# Patient Record
Sex: MALE | Race: WHITE | NOT HISPANIC OR LATINO | Employment: FULL TIME | URBAN - METROPOLITAN AREA
[De-identification: names, ages, dates, MRNs, and addresses within clinical notes are randomized per-mention and may not be internally consistent; named-entity substitution may affect disease eponyms.]

---

## 2017-03-21 ENCOUNTER — ALLSCRIPTS OFFICE VISIT (OUTPATIENT)
Dept: OTHER | Facility: OTHER | Age: 52
End: 2017-03-21

## 2017-03-21 DIAGNOSIS — E11.9 TYPE 2 DIABETES MELLITUS WITHOUT COMPLICATIONS (HCC): ICD-10-CM

## 2017-03-21 DIAGNOSIS — N52.9 MALE ERECTILE DYSFUNCTION: ICD-10-CM

## 2017-03-21 DIAGNOSIS — E66.9 OBESITY: ICD-10-CM

## 2017-03-21 DIAGNOSIS — E78.5 HYPERLIPIDEMIA: ICD-10-CM

## 2017-03-21 DIAGNOSIS — Z12.5 ENCOUNTER FOR SCREENING FOR MALIGNANT NEOPLASM OF PROSTATE: ICD-10-CM

## 2017-03-21 DIAGNOSIS — I10 ESSENTIAL (PRIMARY) HYPERTENSION: ICD-10-CM

## 2017-03-21 DIAGNOSIS — N17.9 ACUTE KIDNEY FAILURE (HCC): ICD-10-CM

## 2017-03-21 DIAGNOSIS — E86.0 DEHYDRATION: ICD-10-CM

## 2017-04-10 ENCOUNTER — GENERIC CONVERSION - ENCOUNTER (OUTPATIENT)
Dept: OTHER | Facility: OTHER | Age: 52
End: 2017-04-10

## 2017-04-10 LAB
AMBIG ABBREV CMP14 DEFAULT (HISTORICAL): NORMAL
AMBIG ABBREV LP DEFAULT (HISTORICAL): NORMAL
BASOPHILS # BLD AUTO: 0 %
BASOPHILS # BLD AUTO: 0 X10E3/UL (ref 0–0.2)
DEPRECATED RDW RBC AUTO: 13.6 % (ref 12.3–15.4)
EOSINOPHIL # BLD AUTO: 0.6 X10E3/UL (ref 0–0.4)
EOSINOPHIL # BLD AUTO: 6 %
HCT VFR BLD AUTO: 39.5 % (ref 37.5–51)
HGB BLD-MCNC: 13.3 G/DL (ref 12.6–17.7)
IMM.GRANULOCYTES (CD4/8) (HISTORICAL): 0 %
IMM.GRANULOCYTES (CD4/8) (HISTORICAL): 0 X10E3/UL (ref 0–0.1)
LYMPHOCYTES # BLD AUTO: 3.5 X10E3/UL (ref 0.7–3.1)
LYMPHOCYTES # BLD AUTO: 36 %
MCH RBC QN AUTO: 29.9 PG (ref 26.6–33)
MCHC RBC AUTO-ENTMCNC: 33.7 G/DL (ref 31.5–35.7)
MCV RBC AUTO: 89 FL (ref 79–97)
MONOCYTES # BLD AUTO: 0.7 X10E3/UL (ref 0.1–0.9)
MONOCYTES (HISTORICAL): 7 %
NEUTROPHILS # BLD AUTO: 5 X10E3/UL (ref 1.4–7)
NEUTROPHILS # BLD AUTO: 51 %
PLATELET # BLD AUTO: 317 X10E3/UL (ref 150–379)
RBC (HISTORICAL): 4.45 X10E6/UL (ref 4.14–5.8)
WBC # BLD AUTO: 9.8 X10E3/UL (ref 3.4–10.8)

## 2017-04-11 LAB
A/G RATIO (HISTORICAL): 2 (ref 1.2–2.2)
ALBUMIN SERPL BCP-MCNC: 4.1 G/DL (ref 3.5–5.5)
ALP SERPL-CCNC: 94 IU/L (ref 39–117)
ALT SERPL W P-5'-P-CCNC: 26 IU/L (ref 0–44)
AST SERPL W P-5'-P-CCNC: 25 IU/L (ref 0–40)
BACTERIA UR QL AUTO: NORMAL
BILIRUB SERPL-MCNC: 0.6 MG/DL (ref 0–1.2)
BILIRUB UR QL STRIP: NEGATIVE
BUN SERPL-MCNC: 32 MG/DL (ref 6–24)
BUN/CREA RATIO (HISTORICAL): 22 (ref 9–20)
CALCIUM SERPL-MCNC: 9 MG/DL (ref 8.7–10.2)
CHLORIDE SERPL-SCNC: 99 MMOL/L (ref 96–106)
CHOLEST SERPL-MCNC: 91 MG/DL (ref 100–199)
CO2 SERPL-SCNC: 23 MMOL/L (ref 18–29)
COLOR UR: YELLOW
COMMENT (HISTORICAL): ABNORMAL
COMMENT (HISTORICAL): CLEAR
CREAT SERPL-MCNC: 1.47 MG/DL (ref 0.76–1.27)
EGFR AFRICAN AMERICAN (HISTORICAL): 63 ML/MIN/1.73
EGFR-AMERICAN CALC (HISTORICAL): 54 ML/MIN/1.73
FECAL OCCULT BLOOD DIAGNOSTIC (HISTORICAL): NEGATIVE
GLUCOSE (HISTORICAL): NEGATIVE
GLUCOSE SERPL-MCNC: 132 MG/DL (ref 65–99)
HBA1C MFR BLD HPLC: 6.6 % (ref 4.8–5.6)
HDLC SERPL-MCNC: 29 MG/DL
KETONES UR STRIP-MCNC: NEGATIVE MG/DL
LDLC SERPL CALC-MCNC: 44 MG/DL (ref 0–99)
LEUKOCYTE ESTERASE UR QL STRIP: NEGATIVE
MICROSCOPIC EXAMINATION (HISTORICAL): NORMAL
MICROSCOPIC EXAMINATION (HISTORICAL): NORMAL
MUCUS THREADS (HISTORICAL): PRESENT
NITRITE UR QL STRIP: NEGATIVE
NON-SQ EPI CELLS URNS QL MICRO: NORMAL /HPF
PH UR STRIP.AUTO: 5.5 [PH] (ref 5–7.5)
POTASSIUM SERPL-SCNC: 4.8 MMOL/L (ref 3.5–5.2)
PROSTATE SPECIFIC ANTIGEN (HISTORICAL): 0.8 NG/ML (ref 0–4)
PROT UR STRIP-MCNC: NORMAL MG/DL
RBC (HISTORICAL): NORMAL /HPF
SODIUM SERPL-SCNC: 139 MMOL/L (ref 134–144)
SP GR UR STRIP.AUTO: 1.03 (ref 1–1.03)
TESTOSTERONE FREE (HISTORICAL): 3.5 PG/ML (ref 7.2–24)
TESTOSTERONE TOTAL (HISTORICAL): 244 NG/DL (ref 348–1197)
TOT. GLOBULIN, SERUM (HISTORICAL): 2.1 G/DL (ref 1.5–4.5)
TOTAL PROTEIN (HISTORICAL): 6.2 G/DL (ref 6–8.5)
TRIGL SERPL-MCNC: 91 MG/DL (ref 0–149)
TSH SERPL DL<=0.05 MIU/L-ACNC: 0.15 UIU/ML (ref 0.45–4.5)
URINALYSIS (UA) (HISTORICAL): NORMAL
UROBILINOGEN UR QL STRIP.AUTO: 1 EU/DL (ref 0.2–1)
VLDLC SERPL CALC-MCNC: 18 MG/DL (ref 5–40)
WBC # BLD AUTO: NORMAL /HPF

## 2017-05-02 ENCOUNTER — GENERIC CONVERSION - ENCOUNTER (OUTPATIENT)
Dept: OTHER | Facility: OTHER | Age: 52
End: 2017-05-02

## 2017-05-03 LAB
BUN SERPL-MCNC: 23 MG/DL (ref 6–24)
BUN/CREA RATIO (HISTORICAL): 19 (ref 9–20)
CALCIUM SERPL-MCNC: 9.5 MG/DL (ref 8.7–10.2)
CHLORIDE SERPL-SCNC: 100 MMOL/L (ref 96–106)
CO2 SERPL-SCNC: 21 MMOL/L (ref 18–29)
CREAT SERPL-MCNC: 1.18 MG/DL (ref 0.76–1.27)
EGFR AFRICAN AMERICAN (HISTORICAL): 82 ML/MIN/1.73
EGFR-AMERICAN CALC (HISTORICAL): 71 ML/MIN/1.73
GLUCOSE SERPL-MCNC: 118 MG/DL (ref 65–99)
POTASSIUM SERPL-SCNC: 4.5 MMOL/L (ref 3.5–5.2)
SODIUM SERPL-SCNC: 138 MMOL/L (ref 134–144)
T3FREE SERPL-MCNC: 124 NG/DL (ref 71–180)
T4 FREE SERPL-MCNC: 1.32 NG/DL (ref 0.82–1.77)

## 2017-05-30 ENCOUNTER — ALLSCRIPTS OFFICE VISIT (OUTPATIENT)
Dept: OTHER | Facility: OTHER | Age: 52
End: 2017-05-30

## 2017-06-19 ENCOUNTER — ALLSCRIPTS OFFICE VISIT (OUTPATIENT)
Dept: OTHER | Facility: OTHER | Age: 52
End: 2017-06-19

## 2017-06-19 ENCOUNTER — GENERIC CONVERSION - ENCOUNTER (OUTPATIENT)
Dept: OTHER | Facility: OTHER | Age: 52
End: 2017-06-19

## 2017-06-19 DIAGNOSIS — E29.1 TESTICULAR HYPOFUNCTION: ICD-10-CM

## 2017-07-21 ENCOUNTER — GENERIC CONVERSION - ENCOUNTER (OUTPATIENT)
Dept: OTHER | Facility: OTHER | Age: 52
End: 2017-07-21

## 2017-07-22 LAB
DEPRECATED RDW RBC AUTO: 13.6 % (ref 12.3–15.4)
HCT VFR BLD AUTO: 39.3 % (ref 37.5–51)
HGB BLD-MCNC: 13.6 G/DL (ref 12.6–17.7)
MCH RBC QN AUTO: 30.2 PG (ref 26.6–33)
MCHC RBC AUTO-ENTMCNC: 34.6 G/DL (ref 31.5–35.7)
MCV RBC AUTO: 87 FL (ref 79–97)
PLATELET # BLD AUTO: 305 X10E3/UL (ref 150–379)
PROSTATE SPECIFIC ANTIGEN (HISTORICAL): 1.1 NG/ML (ref 0–4)
RBC (HISTORICAL): 4.5 X10E6/UL (ref 4.14–5.8)
TESTOSTERONE TOTAL (HISTORICAL): 447 NG/DL (ref 264–916)
WBC # BLD AUTO: 10.8 X10E3/UL (ref 3.4–10.8)

## 2017-07-28 ENCOUNTER — ALLSCRIPTS OFFICE VISIT (OUTPATIENT)
Dept: OTHER | Facility: OTHER | Age: 52
End: 2017-07-28

## 2017-08-28 ENCOUNTER — ALLSCRIPTS OFFICE VISIT (OUTPATIENT)
Dept: OTHER | Facility: OTHER | Age: 52
End: 2017-08-28

## 2017-12-01 DIAGNOSIS — R93.5 ABNORMAL FINDINGS ON DIAGNOSTIC IMAGING OF OTHER ABDOMINAL REGIONS, INCLUDING RETROPERITONEUM: ICD-10-CM

## 2017-12-01 DIAGNOSIS — Z12.5 ENCOUNTER FOR SCREENING FOR MALIGNANT NEOPLASM OF PROSTATE: ICD-10-CM

## 2017-12-01 DIAGNOSIS — E29.1 TESTICULAR HYPOFUNCTION: ICD-10-CM

## 2018-01-10 NOTE — PROGRESS NOTES
History of Present Illness  Care Coordination Encounter Information:   Type of Encounter: Telephonic    Spoke to Patient  Care Coordination SL Nurse [employer]:   The reason for call is to discuss outreach for follow up/needed services and coordination of meeting care plan treatment goals  Followed up with patient in regards to progress; s/p discharge on 8/26/16  Patient in great mood and stated he is doing great  He is healing well and Protonix is working well  Patient also stated he is not having any n&v or s/s of pain or discomfort  Patient has a f/u appointment scheduled on 12/20/16 with Dr Bernabe Closs at [address]  Offered patient assistance; patient had no questions or concerns  Attempted to give my contact information and stated to feel free to reach my direct number if he has any further questions or concerns  Patient kindly stated that would not be necessary, he is doing well and is compliant with following up with PCP if he has any concerns  Active Problems    1  Abnormal CT of the abdomen (793 6) (R93 5)   2  ARF (acute renal failure) (584 9) (N17 9)   3  Benign essential hypertension (401 1) (I10)   4  Dehydration (276 51) (E86 0)   5  Diabetes mellitus, type 2 (250 00) (E11 9)   6  Enteritis (558 9) (K52 9)   7  Erectile dysfunction (607 84) (N52 9)   8  Hernia, ventral (553 20) (K43 9)   9  Hyperlipidemia (272 4) (E78 5)   10  Nausea and vomiting (787 01) (R11 2)   11  Obesity (278 00) (E66 9)   12  Screening for colon cancer (V76 51) (Z12 11)    Past Medical History    1  History of Denial (799 29) (R45 89)    Surgical History    1  Denied: History of Recent Surgery    Family History  Mother    1  Family history of diabetes mellitus (V18 0) (Z83 3)   2  Family history of lung cancer (V16 1) (Z80 1)  Father    3  Family history of CAD (coronary artery disease)    Social History    · Never a smoker   · Non-smoker (V49 89) (Z78 9)    Current Meds    1   AmLODIPine Besylate 5 MG Oral Tablet; TAKE 1 TABLET TWICE DAILY  Requested for:   04Aug2016; Last Rx:04Aug2016 Ordered   2  Lisinopril 40 MG Oral Tablet; TAKE 1 TABLET DAILY  Requested for: 09Aug2016; Last   Rx:09Aug2016 Ordered    3  GlyBURIDE-MetFORMIN 5-500 MG Oral Tablet; Take 2 tablets by mouth  twice a day    Requested for: 08Aug2016; Last Rx:08Aug2016 Ordered    4  Atorvastatin Calcium 40 MG Oral Tablet; take 1 tablet by mouth every day  Requested for:   04Aug2016; Last Rx:04Aug2016 Ordered    5  Aspirin EC 81 MG Oral Tablet Delayed Release Recorded    Allergies    1  No Known Drug Allergies    2  Shellfish    End of Encounter Meds    1  AmLODIPine Besylate 5 MG Oral Tablet; TAKE 1 TABLET TWICE DAILY  Requested for:   04Aug2016; Last Rx:04Aug2016 Ordered   2  Lisinopril 40 MG Oral Tablet; TAKE 1 TABLET DAILY  Requested for: 09Aug2016; Last   Rx:09Aug2016 Ordered    3  GlyBURIDE-MetFORMIN 5-500 MG Oral Tablet; Take 2 tablets by mouth  twice a day    Requested for: 08Aug2016; Last Rx:08Aug2016 Ordered    4  Atorvastatin Calcium 40 MG Oral Tablet; take 1 tablet by mouth every day  Requested for:   04Aug2016; Last Rx:04Aug2016 Ordered    5  Aspirin EC 81 MG Oral Tablet Delayed Release Recorded    Future Appointments    Date/Time Provider Specialty Site   12/19/2016 03:15 PM Olga Griffith MD Urology [address] [place] [address]   12/20/2016 08:15 AM GEM Godwin   Internal Medicine [facility]     Signatures   Electronically signed by : Suyapa Lyons RN; Sep 13 2016  3:30PM EST                       (Author)

## 2018-01-12 NOTE — MISCELLANEOUS
Message   Date: 24 Dec 2016 1:43 PM EST, Recorded By: Ofelia López For: Mary Ingris   Caller: Jean Pierre Randall, Self   Phone: [phone] (Home)   Reason: Renew Medication   Answering service 12/24/2016 1:43 pm   He needs a prescription called in to [facility] for his diabetes-  Please call pharmacy [phone]  Updated now his pharmacy to [facility] [place] [phone]  Plan  answering service contacte me in error while on vacation  I informed them of the mistake        Signatures   Electronically signed by : GEM Coelho ; Jan 5 2017  1:34PM EST                       (Author)

## 2018-01-13 VITALS
SYSTOLIC BLOOD PRESSURE: 150 MMHG | WEIGHT: 281 LBS | BODY MASS INDEX: 39.34 KG/M2 | RESPIRATION RATE: 16 BRPM | HEIGHT: 71 IN | HEART RATE: 80 BPM | DIASTOLIC BLOOD PRESSURE: 90 MMHG

## 2018-01-13 VITALS
SYSTOLIC BLOOD PRESSURE: 148 MMHG | BODY MASS INDEX: 38.92 KG/M2 | HEIGHT: 71 IN | OXYGEN SATURATION: 98 % | DIASTOLIC BLOOD PRESSURE: 82 MMHG | HEART RATE: 93 BPM | WEIGHT: 278 LBS | TEMPERATURE: 98.3 F

## 2018-01-13 VITALS
HEIGHT: 71 IN | HEART RATE: 89 BPM | DIASTOLIC BLOOD PRESSURE: 72 MMHG | TEMPERATURE: 98.3 F | BODY MASS INDEX: 38.27 KG/M2 | SYSTOLIC BLOOD PRESSURE: 118 MMHG | OXYGEN SATURATION: 94 % | WEIGHT: 273.38 LBS

## 2018-01-13 VITALS
WEIGHT: 264.38 LBS | SYSTOLIC BLOOD PRESSURE: 160 MMHG | DIASTOLIC BLOOD PRESSURE: 100 MMHG | HEART RATE: 90 BPM | BODY MASS INDEX: 37.01 KG/M2 | HEIGHT: 71 IN

## 2018-01-13 NOTE — RESULT NOTES
Message    Celiac disease panel is negative  LMOM for pt to call the office for results  CF  LMOM X2 for pt to call the office for results  CF             Verified Results  (1) CELIAC DISEASE AB PROFILE [id number] 04:57AM Ange Enedelia     Test Name Result Flag Reference   tTG IGG <2 U/mL  0 - 5   Negative        0 - 5                                Weak Positive   6 - 9                                Positive           >9   tTG IGA <2 U/mL  0 - 3   Negative        0 -  3                                Weak Positive   4 - 10                                Positive           >10   Tissue Transglutaminase (tTG) has been identified   as the endomysial antigen  Studies have demonstr-   ated that endomysial IgA antibodies have over 99%   specificity for gluten sensitive enteropathy     GLIADA 3 units  0 - 19   Negative                   0 - 19                     Weak Positive             20 - 30                     Moderate to Strong Positive   >30   GLIADG 5 units  0 - 19   Negative                   0 - 19                     Weak Positive             20 - 30                     Moderate to Strong Positive   >30   ENDOMYSIAL AB IGA Negative  Negative   Performed at:  [employer] [address]  [id number]  : Chiquis Roman MD, Phone:  [phone]    mg/dL  90 - 158

## 2018-01-14 NOTE — MISCELLANEOUS
Message   Date: 24 Dec 2016 1:43 PM EST, Recorded By: Aleks Keller For: [facility]   Reason: Renew Medication        Plan  answering service contacte me in error while on vacation  I informed them of the mistake        Signatures   Electronically signed by : GEM Silver ; Jan 5 2017  1:35PM EST                       (Author)

## 2018-01-16 NOTE — MISCELLANEOUS
Assessment    1  Enteritis (558 9) (K52 9)   2  ARF (acute renal failure) (584 9) (N17 9)    Discussion/Summary  Discussion Summary:   Enteritis--this seems to be resolved or resolving  Patient is advised to continue and finish the Levaquin and Flagyl  Acute renal failure--at the time of discharge this had been resolved however we will follow-up on the basic metabolic panel that he did yesterday  She needed to hold lisinopril/hydrochlorothiazide and glipizide/metformin  Diabetes--continue to check her blood sugars fasting in the a m  and a few times a week check a 2 hour postprandial sugar after your dinner meal  Reportedly her sugars to the office  We will notify you if you can and should go back on the glipizide/metformin  Hypertension--the patient is off the lisinopril-hydrochlorothiazide 20-12 5 mg, but he continues to take lisinopril 20 mg once a day, and amlodipine 5 mg twice a day  His blood pressure is stable  Continue those current medications  Chief Complaint  Chief Complaint Free Text Note Form: hospital follow-up      History of Present Illness  [facility] Communication  [facility]: The patient is being contacted for follow-up after hospitalization  He was hospitalized at [facility]  The date of discharge: 8/1/16  Diagnosis: enteritis  Medications reviewed and updated today  He did not schedule a follow up appointment  Follow-up appointments with other specialists: GI f/u  Counseling was provided to the patient  doing much better  Communication performed and completed by lorne rizzo   HPI: patient presents for hospital follow-up  He was admitted to [facility] on July 30th and discharged on August first  He was admitted for nausea and vomiting and diagnosed with enteritis  He also had a brief episode of acute renal failure that corrected quickly  He was treated with anti-biotics and sent home with Levaquin and Flagyl he still taking them    He is feeling much better today   he states he feels great , no abdominal pain, no diarrhea, no nausea or vomiting  He is having a colonoscopy done soon with Dr Washington Degree  When he was discharged his lisinopril/HCTZ, and glipizide-metformin were both held due to the acute renal failure  he was advised to do repeat blood work and follow up with his doctor  he had blood work done yesterday at [facility] and at this time we do not have the results  We will follow up on them  Fasting blood sugar today was 104  Colitis, Unspecified: The patient is being seen for an initial evaluation of colitis  Symptoms:  episode is resolved  The patient is currently asymptomatic  Review of Systems  Complete-Male:   Constitutional: No fever or chills, feels well, no tiredness, no recent weight gain or weight loss  Eyes: No complaints of eye pain, no red eyes, no discharge from eyes, no itchy eyes  ENT: no complaints of earache, no hearing loss, no nosebleeds, no nasal discharge, no sore throat, no hoarseness  Cardiovascular: No complaints of slow heart rate, no fast heart rate, no chest pain, no palpitations, no leg claudication, no lower extremity  Respiratory: No complaints of shortness of breath, no wheezing, no cough, no SOB on exertion, no orthopnea or PND  Gastrointestinal: No complaints of abdominal pain, no constipation, no nausea or vomiting, no diarrhea or bloody stools  ROS Reviewed:   ROS reviewed  Active Problems    1  Benign essential hypertension (401 1) (I10)   2  Diabetes mellitus, type 2 (250 00) (E11 9)   3  Erectile dysfunction (607 84) (N52 9)   4  Hyperlipidemia (272 4) (E78 5)   5  Obesity (278 00) (E66 9)   6  Screening for colon cancer (V76 51) (Z12 11)    Past Medical History    1  History of Denial (599 29) (R46 89)    Surgical History    1  Denied: History of Recent Surgery  Surgical History Reviewed: The surgical history was reviewed and updated today  Family History  Mother    1   Family history of diabetes mellitus (V18 0) (Z83 3)   2  Family history of lung cancer (V16 1) (Z80 1)  Father    3  Family history of CAD (coronary artery disease)  Family History Reviewed: The family history was reviewed and updated today  Social History    · Never a smoker   · Non-smoker (V49 89) (Z78 9)  Social History Reviewed: The social history was reviewed and updated today  Current Meds   1  AmLODIPine Besylate 5 MG Oral Tablet Recorded   2  Aspirin EC 81 MG Oral Tablet Delayed Release Recorded   3  Atorvastatin Calcium 40 MG Oral Tablet Recorded   4  GlyBURIDE-MetFORMIN 5-500 MG Oral Tablet; Take 2 tablets by mouth  twice a day   Recorded   5  Lisinopril 20 MG Oral Tablet; TAKE 1 TABLET DAILY Recorded   6  Lisinopril-Hydrochlorothiazide 20-12 5 MG Oral Tablet; Take 1 tablet twice daily; Therapy: (Recorded:35Tfq9820) to Recorded  Medication List Reviewed: The medication list was reviewed and updated today  Allergies    1  No Known Drug Allergies    2  Shellfish    Physical Exam    Constitutional   General appearance: No acute distress, well appearing and well nourished  Eyes   Conjunctiva and lids: No swelling, erythema, or discharge  Pupils and irises: Equal, round and reactive to light  Ears, Nose, Mouth, and Throat   External inspection of ears and nose: Normal     Otoscopic examination: Tympanic membrance translucent with normal light reflex  Canals patent without erythema  Nasal mucosa, septum, and turbinates: Normal without edema or erythema  Oropharynx: Normal with no erythema, edema, exudate or lesions  Pulmonary   Respiratory effort: No increased work of breathing or signs of respiratory distress  Auscultation of lungs: Clear to auscultation, equal breath sounds bilaterally, no wheezes, no rales, no rhonci  Cardiovascular   Auscultation of heart: Normal rate and rhythm, normal S1 and S2, without murmurs      Examination of extremities for edema and/or varicosities: Normal  Abdomen   Abdomen: Non-tender, no masses  Lymphatic   Palpation of lymph nodes in neck: No lymphadenopathy  Musculoskeletal   Gait and station: Normal          Provider Comments  Provider Comments:   labs and tests in the hospital-    CAT scan of the abdomen and pelvis showed mild wall thickening of Proximal small bowel suggesting enteritis, appendix and gallbladder were normal     amylase and lipase were 75 and 29 respectively  CBC showed a white cell count of 15, dropping to 10 3 on discharge, H&H and platelets were normal     Basic metabolic panel showed a creatinine 1 81 on admission, 1 21 on discharge otherwise normal    Hemoglobin A1c was 7 2    Potassium was 3 4 on admission 3 8 on discharge      Future Appointments    Date/Time Provider Specialty Site   08/25/2016 10:30 AM Jesse Snell MD Urology [address] [place] [address]   12/20/2016 08:15 GEM Myers  Internal Medicine [facility]   08/16/2016 07:45 AM GEM Khan  Gastroenterology Adult [address]     Signatures   Electronically signed by : SANTOS Vásquez;  Aug  4 2016  3:55PM EST                       (Author)    Electronically signed by : GEM Cutler ; Aug  4 2016  5:21PM EST

## 2018-01-16 NOTE — MISCELLANEOUS
History of Present Illness  TCM Communication [facility]: The patient is being contacted for follow-up after hospitalization and 1ST TRY- ANS VIVEK, 2ND TRY- ANS VIVEK, pt never made appt  He was hospitalized at [facility]  The date of discharge: 8/26/16  Diagnosis: INTRACTABLE N/V  Medications reviewed and updated today  He did not schedule a follow up appointment  Follow-up appointments with other specialists: NEEDS GI F/U  Counseling was provided to  DIDN'T REACH PT  Communication performed and completed by SUSIE CLARK      Active Problems    1  Abnormal CT of the abdomen (793 6) (R93 5)   2  ARF (acute renal failure) (584 9) (N17 9)   3  Benign essential hypertension (401 1) (I10)   4  Dehydration (276 51) (E86 0)   5  Diabetes mellitus, type 2 (250 00) (E11 9)   6  Enteritis (558 9) (K52 9)   7  Erectile dysfunction (607 84) (N52 9)   8  Hernia, ventral (553 20) (K43 9)   9  Hyperlipidemia (272 4) (E78 5)   10  Nausea and vomiting (787 01) (R11 2)   11  Obesity (278 00) (E66 9)   12  Screening for colon cancer (V76 51) (Z12 11)    Past Medical History    1  History of Denial (799 29) (R45 89)    Surgical History    1  Denied: History of Recent Surgery    Family History  Mother    1  Family history of diabetes mellitus (V18 0) (Z83 3)   2  Family history of lung cancer (V16 1) (Z80 1)  Father    3  Family history of CAD (coronary artery disease)    Social History    · Never a smoker   · Non-smoker (V49 89) (Z78 9)    Current Meds   1  AmLODIPine Besylate 5 MG Oral Tablet; TAKE 1 TABLET TWICE DAILY  Requested for:   22Txg5428; Last Rx:66Wwb5876 Ordered   2  Aspirin EC 81 MG Oral Tablet Delayed Release Recorded   3  Atorvastatin Calcium 40 MG Oral Tablet; take 1 tablet by mouth every day  Requested for:   47Qhe6064; Last Rx:15Mrn6761 Ordered   4  GlyBURIDE-MetFORMIN 5-500 MG Oral Tablet; Take 2 tablets by mouth  twice a day    Requested for: 37Wnd5682; Last Rx:97Nnn6746 Ordered   5   Lisinopril 40 MG Oral Tablet; TAKE 1 TABLET DAILY  Requested for: 27Dqk2128; Last   Rx:96Ovz2901 Ordered    Allergies    1  No Known Drug Allergies    2  Shellfish    Future Appointments    Date/Time Provider Specialty Site   12/19/2016 03:15 PM Namita Cunningham MD Urology [address] [place] [address]   12/20/2016 08:15 AM GEM Polo   Internal Medicine [facility]     Signatures   Electronically signed by : Mary Marcus, [facility]; Sep 15 2016  3:58PM EST                       (Author)    Electronically signed by : GEM Jeffries ; Sep 15 2016  5:41PM EST

## 2018-01-17 NOTE — PROGRESS NOTES
Chief Complaint  Pt returns to office for teaching of Testosterone injections  Active Problems    1  Abnormal CT of the abdomen (793 6) (R93 5)   2  ARF (acute renal failure) (584 9) (N17 9)   3  Benign essential hypertension (401 1) (I10)   4  Dehydration (276 51) (E86 0)   5  Diabetes mellitus, type 2 (250 00) (E11 9)   6  Encounter for prostate cancer screening (V76 44) (Z12 5)   7  Enteritis (558 9) (K52 9)   8  Erectile dysfunction (607 84) (N52 9)   9  Hernia, ventral (553 20) (K43 9)   10  Hyperlipidemia (272 4) (E78 5)   11  Low testosterone (257 2) (E29 1)   12  Nausea and vomiting (787 01) (R11 2)   13  Obesity (278 00) (E66 9)   14  Screening for colon cancer (V76 51) (Z12 11)   15  Testicular hypogonadism (257 2) (E29 1)    Current Meds   1  AmLODIPine Besylate 5 MG Oral Tablet; TAKE 1 TABLET TWICE DAILY  Requested for:   21Mar2017; Last Rx:21Mar2017 Ordered   2  Aspirin EC 81 MG Oral Tablet Delayed Release Recorded   3  Atorvastatin Calcium 40 MG Oral Tablet; take 1 tablet by mouth every day  Requested   for: 21Mar2017; Last Rx:21Mar2017 Ordered   4  GlyBURIDE-MetFORMIN 5-500 MG Oral Tablet; Take 2 tablets by mouth  twice a day    Requested for: 21Mar2017; Last Rx:21Mar2017 Ordered   5  Lisinopril 40 MG Oral Tablet; TAKE 1 TABLET DAILY  Requested for: 21Mar2017; Last   Rx:21Mar2017 Ordered   6  Ondansetron 4 MG Oral Tablet Disintegrating; 1 tablet PO for persistant vomiting  once   MDD:1 TDD:1;   Therapy: 31QGO5444 to (Last Rx:74Dzl3539)  Requested for: 66WCA9984 Ordered   7  Pantoprazole Sodium 40 MG Oral Tablet Delayed Release; 1 TAB QD;   Therapy: 57Vyv4003 to (Last OL:71DES6731)  Requested for: 39OXV9141 Ordered   8  Reglan 10 MG Oral Tablet; TAKE 1 TABLET 4 TIMES DAILY, BEFORE MEALS AND AT   BEDTIME; Therapy: (Recorded:01Jun2017) to Recorded   9  Testosterone Cypionate 200 MG/ML Intramuscular Solution; inject 0 5 ml once weekly;    Therapy: 40HPN9405 to (Last RN:61STU1489) Ordered    Allergies    1  No Known Drug Allergies    2  Shellfish    Procedure    Procedure:   Pt brought medicine and syringes to office to learn self administration of IM testosterone  Pt demonstrated preparation of syringe ( pt is diabetic and administers insulin) and the injection procedure itself with verbal instructions  Pt performed procedure well  Assessment    1  Testicular hypogonadism (257 2) (E29 1)    Plan  Low testosterone    · (1) PSA, DIAGNOSTIC (FOLLOW-UP); Status:Active - Retrospective By Protocol  Authorization; Requested SPENCER:[id number];    Perform:Merged with [facility]; DGR:[id number]; Last Updated By:Shaniqua Peterson; 2017 10:43:57 AM;Ordered; For:Low testosterone; Ordered By:Dalton Hairston;   · Follow-up visit in 6 weeks Evaluation and Treatment  Follow-up in 6 weeks with CBC,  Testosterone level and PSA PTV  Status: Hold For - Scheduling,Retrospective By  Protocol Authorization  Requested for: [id number]   Ordered; For: Low testosterone; Ordered By: Gwen Toledo Performed:  Due: 64BQX6553; Last Updated By: Joanna Boston; 2017 2:56:46 PM  Testicular hypogonadism    · (1) CBC/ PLT (NO DIFF); Status:Active - Retrospective By Protocol Authorization; Requested [id number];    Perform:Merged with [facility]; YNA:[id number]; Last Updated By:Shaniqua Peterson; 2017 10:42:34 AM;Ordered; For:Testicular hypogonadism; Ordered By:Dalton Hairston;   · (1) TESTOSTERONE; Status:Active - Retrospective By Protocol Authorization; Requested  POV:[id number];    Perform:Merged with [facility]; ELMER:[id number]; Last Updated By:Shaniqua Peterson; 2017 10:43:01 AM;Ordered; For:Testicular hypogonadism; Ordered By:Rosenthal, Rogerio Boas;     Future Appointments    Date/Time Provider Specialty Site   2017 01:30 PM Danni Estrada Urology [facility]     Signatures   Electronically signed by : Rojas Ibarra, ; 2017  2:57PM EST                       (Author) Electronically signed by : GEM Bernal ; Jun 23 2017  3:15PM EST

## 2018-01-22 VITALS
HEIGHT: 72 IN | SYSTOLIC BLOOD PRESSURE: 160 MMHG | HEART RATE: 80 BPM | DIASTOLIC BLOOD PRESSURE: 90 MMHG | WEIGHT: 278.13 LBS | BODY MASS INDEX: 37.67 KG/M2

## 2018-02-14 DIAGNOSIS — N52.9 ERECTILE DYSFUNCTION, UNSPECIFIED ERECTILE DYSFUNCTION TYPE: Primary | ICD-10-CM

## 2018-02-14 NOTE — TELEPHONE ENCOUNTER
Patient called requesting renewal of his Stendra prescription  Patient also calling to schedule follow up appointment  Patient travels for work and will be in the area next in May  Appointment scheduled  Patient requested blood work scripts needed for appointment be mailed to his home address

## 2018-04-16 ENCOUNTER — TELEPHONE (OUTPATIENT)
Dept: UROLOGY | Facility: CLINIC | Age: 53
End: 2018-04-16

## 2018-04-16 RX ORDER — PREDNISONE 10 MG/1
TABLET ORAL
COMMUNITY
Start: 2017-08-28 | End: 2018-04-17

## 2018-04-16 RX ORDER — TESTOSTERONE CYPIONATE 200 MG/ML
INJECTION INTRAMUSCULAR
COMMUNITY
Start: 2017-05-30 | End: 2018-05-10 | Stop reason: SDUPTHER

## 2018-04-16 RX ORDER — METHOCARBAMOL 750 MG/1
TABLET, FILM COATED ORAL
Refills: 0 | COMMUNITY
Start: 2018-02-21 | End: 2019-01-15

## 2018-04-16 NOTE — TELEPHONE ENCOUNTER
Returned call from patient, his pharmacy benefits changed from [facility] to [place] [address] phone number for pro act is [phone] , fax [phone]  Member ID # [id number]  Patient states he needs authorization for Selca  Advised will send to Ron Corona in Prior Auth   I Emailed patient labslips for CBC, testosterone and PSA to [email or web address] per his request

## 2018-04-17 ENCOUNTER — TELEPHONE (OUTPATIENT)
Dept: INTERNAL MEDICINE CLINIC | Facility: CLINIC | Age: 53
End: 2018-04-17

## 2018-04-17 ENCOUNTER — OFFICE VISIT (OUTPATIENT)
Dept: INTERNAL MEDICINE CLINIC | Facility: CLINIC | Age: 53
End: 2018-04-17
Payer: COMMERCIAL

## 2018-04-17 VITALS
RESPIRATION RATE: 16 BRPM | HEART RATE: 103 BPM | DIASTOLIC BLOOD PRESSURE: 92 MMHG | HEIGHT: 70 IN | WEIGHT: 292.4 LBS | BODY MASS INDEX: 41.86 KG/M2 | SYSTOLIC BLOOD PRESSURE: 144 MMHG | OXYGEN SATURATION: 95 %

## 2018-04-17 DIAGNOSIS — N52.9 ERECTILE DYSFUNCTION, UNSPECIFIED ERECTILE DYSFUNCTION TYPE: ICD-10-CM

## 2018-04-17 DIAGNOSIS — E78.2 MIXED HYPERLIPIDEMIA: ICD-10-CM

## 2018-04-17 DIAGNOSIS — K43.9 VENTRAL HERNIA WITHOUT OBSTRUCTION OR GANGRENE: ICD-10-CM

## 2018-04-17 DIAGNOSIS — I10 BENIGN ESSENTIAL HYPERTENSION: Chronic | ICD-10-CM

## 2018-04-17 DIAGNOSIS — E29.1 TESTICULAR HYPOGONADISM: ICD-10-CM

## 2018-04-17 DIAGNOSIS — E66.01 MORBID OBESITY (HCC): ICD-10-CM

## 2018-04-17 DIAGNOSIS — E11.8 TYPE 2 DIABETES MELLITUS WITH COMPLICATION, WITHOUT LONG-TERM CURRENT USE OF INSULIN (HCC): Primary | ICD-10-CM

## 2018-04-17 PROBLEM — R79.89 LOW TESTOSTERONE: Status: ACTIVE | Noted: 2017-05-30

## 2018-04-17 LAB
LEFT EYE DIABETIC RETINOPATHY: NORMAL
LEFT EYE IMAGE QUALITY: NORMAL
RIGHT EYE DIABETIC RETINOPATHY: NORMAL
RIGHT EYE IMAGE QUALITY: NORMAL
SEVERITY (EYE EXAM): NORMAL

## 2018-04-17 PROCEDURE — 99214 OFFICE O/P EST MOD 30 MIN: CPT | Performed by: INTERNAL MEDICINE

## 2018-04-17 PROCEDURE — 3008F BODY MASS INDEX DOCD: CPT | Performed by: INTERNAL MEDICINE

## 2018-04-17 PROCEDURE — 3072F LOW RISK FOR RETINOPATHY: CPT | Performed by: INTERNAL MEDICINE

## 2018-04-17 PROCEDURE — 92250 FUNDUS PHOTOGRAPHY W/I&R: CPT | Performed by: INTERNAL MEDICINE

## 2018-04-17 RX ORDER — GLYBURIDE-METFORMIN HYDROCHLORIDE 2.5; 5 MG/1; MG/1
1 TABLET ORAL 2 TIMES DAILY WITH MEALS
Qty: 90 TABLET | Refills: 3 | Status: SHIPPED | OUTPATIENT
Start: 2018-04-17 | End: 2018-04-24

## 2018-04-17 RX ORDER — ONDANSETRON 8 MG/1
8 TABLET, ORALLY DISINTEGRATING ORAL
COMMUNITY
Start: 2017-02-26 | End: 2018-04-17

## 2018-04-17 RX ORDER — PANTOPRAZOLE SODIUM 20 MG/1
20 TABLET, DELAYED RELEASE ORAL
COMMUNITY
Start: 2017-02-21 | End: 2018-09-20 | Stop reason: SDUPTHER

## 2018-04-17 RX ORDER — METOCLOPRAMIDE 10 MG/1
10 TABLET ORAL
COMMUNITY
Start: 2017-02-26 | End: 2019-10-08 | Stop reason: SDUPTHER

## 2018-04-17 NOTE — TELEPHONE ENCOUNTER
Patient was seen today,his Yo Richards is wrong  Eulalio Angela all along he has been taking 2 pills twice a day    But pick-up the new med's and its  1 pill  twice day   Was it change if so why , if not please sent the addication med's

## 2018-04-17 NOTE — PROGRESS NOTES
INTERNAL MEDICINE OFFICE VISIT  [facility] AT [address]  Tel: [phone]      NAME: Bri Murphy  AGE: 47 y o  SEX: male  : 1965   MRN: [id number]    DATE: 2018  TIME: 10:10 AM      Assessment and Plan:  1  Type 2 diabetes mellitus with complication, without long-term current use of insulin ([facility] )  Fairly well controlled on present medications, has not had a hemoglobin A1c level for a year  Will check labs and get back to him  He also had foot exam and eye exam today in the office     - glyBURIDE-metFORMIN (GLUCOVANCE) 2 5-500 MG per tablet; Take 1 tablet by mouth 2 (two) times a day with meals  Dispense: 90 tablet; Refill: 3  - CBC and differential; Future  - Comprehensive metabolic panel; Future  - HEMOGLOBIN A1C W/ EAG ESTIMATION; Future  - Microalbumin / creatinine urine ratio  - TSH, 3rd generation; Future  - POCT diabetic eye exam  - CBC and differential; Future  - Comprehensive metabolic panel; Future  - HEMOGLOBIN A1C W/ EAG ESTIMATION; Future  - TSH, 3rd generation; Future    2  Benign essential hypertension  Blood pressure stable, continue same medication  He has a lot of swelling of his ankles but does not want to change his medications at this point and says that he will cut down on the sodium intake  If at the next visit do, his swelling persists, I will have to discontinue the amlodipine  The  3  Mixed hyperlipidemia  He will continue atorvastatin regularly    - Lipid panel; Future  - Lipid panel; Future    4  Ventral hernia without obstruction or gangrene  Last made appointment with the surgeon soon  5  Testicular hypogonadism  Continue testosterone    6  Erectile dysfunction, unspecified erectile dysfunction type  Stable on medication    7  Morbid obesity ([facility] )  He was told to cut down on the calories and increase his activity        - Counseling Documentation: patient was counseled regarding: instructions for management, risk factor reductions, prognosis, patient and family education, impressions, risks and benefits of treatment options and importance of compliance with treatment  - Medication Side Effects: Adverse side effects of medications were reviewed with the patient/guardian today  Return for follow up visit in 4 months or earlier, if needed  Chief Complaint:  Chief Complaint   Patient presents with    Well Check         History of Present Illness:   Type 2 diabetes-he continues to take the glyburide and metformin but has not had blood work for a very long time  Hypertension-blood pressure is stable on amlodipine and lisinopril  Hyperlipidemia-she takes atorvastatin regularly  Ventral hernia-she was told to make an appointment with the surgeon  Testicular hypogonadism-she follows up with Urology and takes testosterone shots  Morbid obesity-she was told to cut down on calories and increase his activity in order to lose      Active Problem List:  Patient Active Problem List   Diagnosis    Type 2 diabetes mellitus without complication, without long-term [facility] use of [facility]    Benign essential hypertension    Mixed hyperlipidemia    Erectile dysfunction    Hernia, ventral    Low testosterone    Testicular hypogonadism    Morbid obesity ([facility] [address] )         Past Medical History:  Past Medical History:   Diagnosis Date    Acute renal failure ([facility] [facility] )     [id number] resolved    Diabetes mellitus ([facility] )     Enteritis 8/23/2016    Gastroparesis due to DM ([facility] ) 8/23/2016    HLD (hyperlipidemia) 8/23/2016    HTN (hypertension), benign 8/23/2016    Hyperlipidemia     Hypertension     Intractable vomiting with nausea 8/23/2016    Regional enteritis of small bowel ([facility] ) 8/23/2016         Past Surgical History:  Past Surgical History:   Procedure Laterality Date    ESOPHAGOGASTRODUODENOSCOPY N/A 8/24/2016    Procedure: ESOPHAGOGASTRODUODENOSCOPY (EGD);   Surgeon: Srini Vargas MD; [facility]; Service:     UMBILICAL HERNIA REPAIR           Family History:  Family History   Problem Relation Age of Onset    Diabetes Mother      mellitus    Lung cancer Mother     Coronary artery disease Father          Social History:  Social History     Social History    Marital status: /Civil Union     Spouse name: N/A    Number of children: N/A    Years of education: N/A     Social History Main Topics    Smoking status: Never Smoker    Smokeless tobacco: Never Used    Alcohol use Yes      Comment: Rarely    Drug use: No    Sexual activity: Yes     Partners: Female     Other Topics Concern    None     Social History Narrative    None         Allergies: Allergies   Allergen Reactions    Shellfish-Derived Products Anaphylaxis    Erythromycin GI Intolerance         Medications:    Current Outpatient Prescriptions:     amLODIPine (NORVASC) 5 mg tablet, Take 5 mg by mouth 2 (two) times a day , Disp: , Rfl:     aspirin 81 MG tablet, Take 81 mg by mouth daily  , Disp: , Rfl:     atorvastatin (LIPITOR) 20 mg tablet, Take 40 mg by mouth daily    , Disp: , Rfl:     Avanafil (STENDRA) 100 MG TABS, Take 1 tablet (100 mg total) by mouth as needed (intercourse) for up to 6 doses, Disp: 6 tablet, Rfl: 11    glyBURIDE-metFORMIN (GLUCOVANCE) 2 5-500 MG per tablet, Take 1 tablet by mouth 2 (two) times a day with meals, Disp: 90 tablet, Rfl: 3    lisinopril (ZESTRIL) 20 mg tablet, Take 40 mg by mouth 2 (two) times a day   , Disp: , Rfl:     methocarbamol (ROBAXIN) 750 mg tablet, TK 1 T PO TID PRN FOR SPASMS, Disp: , Rfl: 0    metoclopramide (REGLAN) 10 mg tablet, Take 10 mg by mouth, Disp: , Rfl:     pantoprazole (PROTONIX) 20 mg tablet, Take 20 mg by mouth, Disp: , Rfl:     testosterone cypionate (DEPO-TESTOSTERONE) 200 mg/mL SOLN, Inject into the shoulder, thigh, or buttocks, Disp: , Rfl:       The following portions of the patient's history were reviewed and updated as appropriate: past medical history, past surgical history, family history, social history, allergies, current medications and active problem list       Review of Systems:  Constitutional: Denies fever, chills, weight gain, weight loss, fatigue  Eyes: Denies eye redness, eye discharge, double vision, change in visual acuity  ENT: Denies hearing loss, tinnitus, sneezing, nasal congestion, nasal discharge, sore throat   Respiratory: Denies cough, expectoration, hemoptysis, shortness of breath, wheezing  Cardiovascular: Denies chest pain, palpitations, lower extremity swelling, orthopnea, PND  Gastrointestinal: Denies abdominal pain, heartburn, nausea, vomiting, hematemesis, diarrhea, bloody stools  Genito-Urinary: Denies dysuria, frequency, difficulty in micturition, nocturia, incontinence  Musculoskeletal: Denies back pain, joint pain, muscle pain  Neurologic: Denies confusion, lightheadedness, syncope, headache, focal weakness, sensory changes, seizures  Endocrine: Denies polyuria, polydipsia, temperature intolerance  Allergy and Immunology: Denies hives, insect bite sensitivity  Hematological and Lymphatic: Denies bleeding problems, swollen glands   Psychological: Denies depression, suicidal ideation, anxiety, panic, mood swings  Dermatological: Denies pruritus, rash, skin lesion changes      Vitals:  Vitals:    04/17/18 0936   BP: 144/92   Pulse: 103   Resp: 16   SpO2: 95%       Body mass index is 42 56 kg/m²  Weight (last 2 days)     Date/Time   Weight    04/17/18 0936  133 (292 4)                Physical Examination:  General: Patient is not in acute distress  Awake, alert, responding to commands  No weight gain or loss  Head: Normocephalic  Atraumatic  Eyes: Conjunctiva and lids with no swelling, erythema or discharge  Both pupils normal sized, round and reactive to light   Sclera nonicteric  ENT: External examination of nose and ear normal  Otoscopic examination shows translucent tympanic membranes with patent canals without erythema  Oropharynx moist with no erythema, edema, exudate or lesions  Neck: Supple  JVP not raised  Trachea midline  No masses  No thyromegaly  Lungs: No signs of increased work of breathing or respiratory distress  Bilateral bronchovascular breath sounds with no crackles or rhonchi  Chest wall: No tenderness  Cardiovascular: Normal PMI  No thrills  Regular rate and rhythm  S1 and S2 normal  No murmur, rub or gallop  Gastrointestinal: Abdomen soft, nontender  No guarding or rigidity  Liver and spleen not palpable  Bowel sounds present  Neurologic: Cranial nerves II-XII intact  Cortical functions normal  Motor system - Reflexes 2+ and symmetrical  Sensations normal  Musculoskeletal: Gait normal  No joint tenderness  Integumentary: Skin normal with no rash or lesions  Lymphatic: No palpable lymph nodes in neck, axilla or groin  Extremities:  Has edema of both extremities up to the knees  Psychological: Judgement and insight normal  Mood and affect normal    Patient's shoes and socks removed  Right Foot/Ankle   Right Foot Inspection  Skin Exam: skin normal and skin intact no dry skin, no warmth, no callus, no erythema, no maceration, no abnormal color, no pre-ulcer, no ulcer and no callus                          Toe Exam: ROM and strength within normal limits  Sensory     Proprioception: diminished and intact   Monofilament testing: intact  Vascular  Capillary refills: < 3 seconds  The right DP pulse is 2+  The right PT pulse is 2+  Left Foot/Ankle  Left Foot Inspection  Skin Exam: skin normal and skin intactno dry skin, no warmth, no erythema, no maceration, normal color, no pre-ulcer, no ulcer and no callus                         Toe Exam: ROM and strength within normal limits                   Sensory     Proprioception: intact  Monofilament: intact  Vascular  Capillary refills: < 3 seconds  The left DP pulse is 2+  The left PT pulse is 2+  Assign Risk Category:  No deformity present;  No loss of protective sensation; No weak pulses       Risk: 0      Laboratory Results:  CBC with diff:   Lab Results   Component Value Date    WBC 10 8 07/21/2017    RBC 4 53 08/25/2016    HGB 13 6 07/21/2017    HCT 39 3 07/21/2017    MCV 87 07/21/2017    MCH 30 2 07/21/2017    RDW 13 6 07/21/2017     07/21/2017       CMP:  Lab Results   Component Value Date    CREATININE 1 18 05/02/2017    BUN 23 05/02/2017     05/02/2017    K 4 5 05/02/2017     05/02/2017    CO2 21 05/02/2017    GLUCOSE 118 (H) 05/02/2017    PROT 6 2 04/10/2017    ALKPHOS 94 04/10/2017    ALT 26 04/10/2017    AST 25 04/10/2017       Lab Results   Component Value Date    HGBA1C 6 6 (H) 04/10/2017       No results found for: TROPONINI, CKMB, CKTOTAL    Lipid Profile:   Lab Results   Component Value Date    CHOL 91 (L) 04/10/2017    CHOL 97 (L) 07/18/2016     Lab Results   Component Value Date    HDL 29 (L) 04/10/2017    HDL 38 (L) 07/18/2016     Lab Results   Component Value Date    LDLCALC 44 04/10/2017    LDLCALC 40 07/18/2016     Lab Results   Component Value Date    TRIG 91 04/10/2017    TRIG 93 07/18/2016         Health Maintenance:  Health Maintenance   Topic Date Due    HIV SCREENING  1965    Hepatitis C Screening  1965    COLONOSCOPY  1965    PNEUMOCOCCAL POLYSACCHARIDE VACCINE AGE 2-64 HIGH RISK  07/28/1966    Diabetic Foot Exam  07/28/1974    OPHTHALMOLOGY EXAM  07/28/1974    DTaP,Tdap,and Td Vaccines (1 - Tdap) 07/28/1985    URINE MICROALBUMIN  07/18/2017    INFLUENZA VACCINE  09/01/2018 (Originally 9/1/2017)    Depression Screening PHQ-9  04/17/2019     There is no immunization history for the selected administration types on file for this patient        Mahad Giles MD  4/17/2018,10:10 AM

## 2018-04-18 NOTE — TELEPHONE ENCOUNTER
Please tell the patient that Dr Alessia Burnette will be back on Monday  I cannot tell whether or not she changed the dose  The dose of the actual pills is also different from All scripts    In addition to the amount that he is supposed to take  I do not want to make the change until she confirms that it is supposed to be which she ordered, or she made a mistake  Please send this back to Dr Stallings Michael these tasks

## 2018-04-23 NOTE — TELEPHONE ENCOUNTER
Aniket Hurley  This patients Neli Chu is not covered by the insurance it was denied, a letter of medical necessity can be sent over for an appeal if the doctor would like to write a medical necessity letter stating why this patients needs this medication covered    Thanks  Leo Malik

## 2018-04-23 NOTE — TELEPHONE ENCOUNTER
Called pt and left a message to find out the exact dose and mg of glyburide-metformin, so we can send the correct med  He should call back

## 2018-04-23 NOTE — TELEPHONE ENCOUNTER
Pt cld stating he takes 5/500mg 2 tablets 2x a day  He also needs his AMLODIPINE refilled, which needs prior authiorization

## 2018-04-24 DIAGNOSIS — I10 BENIGN ESSENTIAL HYPERTENSION: Chronic | ICD-10-CM

## 2018-04-24 DIAGNOSIS — E11.9 TYPE 2 DIABETES MELLITUS WITHOUT COMPLICATION, WITHOUT LONG-TERM CURRENT USE OF INSULIN (HCC): Primary | ICD-10-CM

## 2018-04-24 DIAGNOSIS — E11.8 TYPE 2 DIABETES MELLITUS WITH COMPLICATION, WITHOUT LONG-TERM CURRENT USE OF INSULIN (HCC): ICD-10-CM

## 2018-04-24 RX ORDER — GLYBURIDE-METFORMIN HYDROCHLORIDE 5; 500 MG/1; MG/1
2 TABLET ORAL 2 TIMES DAILY WITH MEALS
Qty: 360 TABLET | Refills: 3 | Status: SHIPPED | OUTPATIENT
Start: 2018-04-24 | End: 2019-04-21 | Stop reason: SDUPTHER

## 2018-04-24 RX ORDER — AMLODIPINE BESYLATE 5 MG/1
5 TABLET ORAL 2 TIMES DAILY
Qty: 180 TABLET | Refills: 1 | Status: SHIPPED | OUTPATIENT
Start: 2018-04-24 | End: 2018-10-26 | Stop reason: SDUPTHER

## 2018-05-03 ENCOUNTER — TELEPHONE (OUTPATIENT)
Dept: INTERNAL MEDICINE CLINIC | Facility: CLINIC | Age: 53
End: 2018-05-03

## 2018-05-03 DIAGNOSIS — J30.1 SEASONAL ALLERGIC RHINITIS DUE TO POLLEN: Primary | ICD-10-CM

## 2018-05-03 RX ORDER — LORATADINE AND PSEUDOEPHEDRINE 10; 240 MG/1; MG/1
1 TABLET, EXTENDED RELEASE ORAL DAILY
Qty: 30 TABLET | Refills: 3 | Status: SHIPPED | OUTPATIENT
Start: 2018-05-03 | End: 2022-02-18 | Stop reason: CLARIF

## 2018-05-03 NOTE — TELEPHONE ENCOUNTER
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

## 2018-05-03 NOTE — TELEPHONE ENCOUNTER
Please call [facility] at [phone] to give approval for the Claritin D  It is considered a control substance in [place]   Patient is Fiona Martinez

## 2018-05-08 DIAGNOSIS — E78.5 HYPERLIPIDEMIA, UNSPECIFIED HYPERLIPIDEMIA TYPE: Primary | ICD-10-CM

## 2018-05-08 RX ORDER — ATORVASTATIN CALCIUM 40 MG/1
TABLET, FILM COATED ORAL
Qty: 90 TABLET | Refills: 2 | Status: SHIPPED | OUTPATIENT
Start: 2018-05-08 | End: 2019-02-12 | Stop reason: SDUPTHER

## 2018-05-10 ENCOUNTER — OFFICE VISIT (OUTPATIENT)
Dept: UROLOGY | Facility: CLINIC | Age: 53
End: 2018-05-10
Payer: COMMERCIAL

## 2018-05-10 VITALS
BODY MASS INDEX: 43.25 KG/M2 | WEIGHT: 292 LBS | DIASTOLIC BLOOD PRESSURE: 82 MMHG | SYSTOLIC BLOOD PRESSURE: 138 MMHG | HEART RATE: 78 BPM | HEIGHT: 69 IN

## 2018-05-10 DIAGNOSIS — E29.1 TESTICULAR HYPOGONADISM: Primary | ICD-10-CM

## 2018-05-10 PROCEDURE — 99213 OFFICE O/P EST LOW 20 MIN: CPT | Performed by: PHYSICIAN ASSISTANT

## 2018-05-10 RX ORDER — TESTOSTERONE CYPIONATE 200 MG/ML
200 INJECTION INTRAMUSCULAR
Qty: 1 VIAL | Refills: 5 | Status: SHIPPED | OUTPATIENT
Start: 2018-05-10 | End: 2018-10-25 | Stop reason: SDUPTHER

## 2018-05-10 NOTE — PROGRESS NOTES
1  Testicular hypogonadism  testosterone cypionate (DEPO-TESTOSTERONE) 200 mg/mL SOLN    Testosterone    PSA, Total Screen    CBC    Needle, Disp, (HYPODERMIC NEEDLE) 23G X 1-1/2" MISC         Assessment and plan:       1  Hypogonadism -- managed by Dr Jose Doyle  - continue with testosterone cypionate 100mg IM once weekly  - refills provided and side effects discussed  - follow up 6 months CBC and testosterone prior to visit  - all questions answered  2  Prostate cancer screening  - PSA stable  -  Follow up 6 months PSA PTV    3  Erectile dysfunction  - Rx for sildenafil provided  Side effect profile reviewed  Yonathan Davis PA-C      Chief Complaint     f/u hypogonadism     History of Present Illness     Julio Cesar Ramos is a 47 y o  male patient of Dr Jose Doyle with a history of testicular hypogonadism and erectile dysfunction presenting for follow up    Patient is using testosterone cypionate injections 100mg IM once a week  Patient's levels were previously stable in the 400 range  He states he ran out of refills about 3 weeks prior to his blood work in which is consistent with his lower testosterone level  Patient does notice increased fatigue and decreased libido since being off of testosterone for the past 1 5months  Patient still notes some difficulty obtaining a rigid erection  He has tried cialis and viagra previously  We attempted Ghotra Spinner, unfortunately this was not covered by insurance  Recent testosterone of 286, hematocrit 46 4%, and PSA 0 9 (4/17/18)  Laboratory     Lab Results   Component Value Date    CREATININE 1 18 05/02/2017       Lab Results   Component Value Date    PSA 1 1 07/21/2017    PSA 0 8 04/10/2017         Review of Systems     Review of Systems   Constitutional: Negative for activity change, appetite change, chills, diaphoresis, fatigue, fever and unexpected weight change  Respiratory: Negative for chest tightness and shortness of breath  Cardiovascular: Negative for chest pain, palpitations and leg swelling  Gastrointestinal: Negative for abdominal distention, abdominal pain, constipation, diarrhea, nausea and vomiting  Genitourinary: Negative for decreased urine volume, difficulty urinating, dysuria, enuresis, flank pain, frequency, genital sores, hematuria and urgency  Musculoskeletal: Negative for back pain, gait problem and myalgias  Skin: Negative for color change, pallor, rash and wound  Psychiatric/Behavioral: Negative for behavioral problems  The patient is not nervous/anxious  Allergies     Allergies   Allergen Reactions    Shellfish-Derived Products Anaphylaxis    Erythromycin GI Intolerance       Physical Exam     Physical Exam   Constitutional: He is oriented to person, place, and time  He appears well-developed and well-nourished  No distress  HENT:   Head: Normocephalic and atraumatic  Eyes: Conjunctivae are normal    Neck: Normal range of motion  No tracheal deviation present  Pulmonary/Chest: Effort normal    Musculoskeletal: Normal range of motion  He exhibits no edema or deformity  Neurological: He is alert and oriented to person, place, and time  Skin: Skin is warm and dry  No rash noted  He is not diaphoretic  No erythema  Psychiatric: He has a normal mood and affect  His behavior is normal          Vital Signs     Vitals:    05/10/18 1129   BP: 138/82   BP Location: Left arm   Patient Position: Sitting   Cuff Size: Adult   Pulse: 78   Weight: 132 kg (292 lb)   Height: 5' 9" (1 753 m)         Current Medications       Current Outpatient Prescriptions:     amLODIPine (NORVASC) 5 mg tablet, Take 1 tablet (5 mg total) by mouth 2 (two) times a day, Disp: 180 tablet, Rfl: 1    aspirin 81 MG tablet, Take 81 mg by mouth daily  , Disp: , Rfl:     atorvastatin (LIPITOR) 20 mg tablet, Take 40 mg by mouth daily    , Disp: , Rfl:     atorvastatin (LIPITOR) 40 mg tablet, TAKE 1 TABLET BY MOUTH EVERY DAY, Disp: 90 tablet, Rfl: 2    Avanafil (STENDRA) 100 MG TABS, Take 1 tablet (100 mg total) by mouth as needed (intercourse) for up to 6 doses, Disp: 6 tablet, Rfl: 11    glyBURIDE-metFORMIN (GLUCOVANCE) 5-500 MG per tablet, Take 2 tablets by mouth 2 (two) times a day with meals, Disp: 360 tablet, Rfl: 3    lisinopril (ZESTRIL) 20 mg tablet, Take 40 mg by mouth 2 (two) times a day   , Disp: , Rfl:     loratadine-pseudoephedrine (CLARITIN-D 24-HOUR)  mg per 24 hr tablet, Take 1 tablet by mouth daily, Disp: 30 tablet, Rfl: 3    methocarbamol (ROBAXIN) 750 mg tablet, TK 1 T PO TID PRN FOR SPASMS, Disp: , Rfl: 0    metoclopramide (REGLAN) 10 mg tablet, Take 10 mg by mouth, Disp: , Rfl:     pantoprazole (PROTONIX) 20 mg tablet, Take 20 mg by mouth, Disp: , Rfl:     testosterone cypionate (DEPO-TESTOSTERONE) 200 mg/mL SOLN, Inject 1 mL (200 mg total) into the shoulder, thigh, or buttocks every 14 (fourteen) days, Disp: 1 vial, Rfl: 5    Needle, Disp, (HYPODERMIC NEEDLE) 23G X 1-1/2" MISC, by Does not apply route once a week, Disp: 10 each, Rfl: 6      Active Problems     Patient Active Problem List   Diagnosis    Type 2 diabetes mellitus without complication, without long-term current use of insulin ([facility])    Benign essential hypertension    Mixed hyperlipidemia    Erectile dysfunction    Hernia, ventral    Low testosterone    Testicular hypogonadism    Morbid obesity ([facility] )         Past Medical History     Past Medical History:   Diagnosis Date    Acute renal failure ([facility] )     [id number] resolved    Diabetes mellitus ([facility] )     Enteritis 8/23/2016    Gastroparesis due to DM ([facility] ) 8/23/2016    HLD (hyperlipidemia) 8/23/2016    HTN (hypertension), benign 8/23/2016    Hyperlipidemia     Hypertension     Intractable vomiting with nausea 8/23/2016    Regional enteritis of small bowel ([facility] ) 8/23/2016         Surgical History     Past Surgical History:   Procedure Laterality Date    ESOPHAGOGASTRODUODENOSCOPY N/A 8/24/2016    Procedure: ESOPHAGOGASTRODUODENOSCOPY (EGD); Surgeon: Lucy Nova MD;  [facility];   Service:     UMBILICAL HERNIA REPAIR           Family History     Family History   Problem Relation Age of Onset    Diabetes Mother      mellitus    Lung cancer Mother     Coronary artery disease Father          Social History     Social History       Radiology

## 2018-05-10 NOTE — TELEPHONE ENCOUNTER
Patient is scheduled for follow up this morning with Barb Denton  I informed Barb Denton and she will discuss at follow up today

## 2018-05-10 NOTE — TELEPHONE ENCOUNTER
Aniket Santam[facility] is still denying the Karla Kelley even with the letter faxed in the alternative would be Sildenafil or Cialis which I told them he already tried Cialis     Thanks  Beatriz Ackerman

## 2018-07-02 DIAGNOSIS — I10 ESSENTIAL HYPERTENSION: Primary | ICD-10-CM

## 2018-07-03 DIAGNOSIS — I10 ESSENTIAL HYPERTENSION: ICD-10-CM

## 2018-07-03 RX ORDER — LISINOPRIL 40 MG/1
TABLET ORAL
Qty: 90 TABLET | Refills: 1 | Status: SHIPPED | OUTPATIENT
Start: 2018-07-03 | End: 2018-07-03 | Stop reason: SDUPTHER

## 2018-07-03 RX ORDER — LISINOPRIL 40 MG/1
TABLET ORAL
Qty: 90 TABLET | Refills: 1 | Status: SHIPPED | OUTPATIENT
Start: 2018-07-03 | End: 2018-07-05 | Stop reason: SDUPTHER

## 2018-07-05 DIAGNOSIS — I10 ESSENTIAL HYPERTENSION: ICD-10-CM

## 2018-07-05 PROCEDURE — 4010F ACE/ARB THERAPY RXD/TAKEN: CPT | Performed by: INTERNAL MEDICINE

## 2018-07-05 RX ORDER — LISINOPRIL 40 MG/1
TABLET ORAL
Qty: 90 TABLET | Refills: 1 | Status: SHIPPED | OUTPATIENT
Start: 2018-07-05 | End: 2019-06-17 | Stop reason: SDUPTHER

## 2018-09-20 ENCOUNTER — TELEPHONE (OUTPATIENT)
Dept: INTERNAL MEDICINE CLINIC | Facility: CLINIC | Age: 53
End: 2018-09-20

## 2018-09-20 DIAGNOSIS — K21.9 GASTROESOPHAGEAL REFLUX DISEASE WITHOUT ESOPHAGITIS: Primary | ICD-10-CM

## 2018-09-20 RX ORDER — PANTOPRAZOLE SODIUM 20 MG/1
20 TABLET, DELAYED RELEASE ORAL DAILY
Qty: 90 TABLET | Refills: 1 | Status: SHIPPED | OUTPATIENT
Start: 2018-09-20 | End: 2019-04-02 | Stop reason: CLARIF

## 2018-10-01 ENCOUNTER — TELEPHONE (OUTPATIENT)
Dept: INTERNAL MEDICINE CLINIC | Facility: CLINIC | Age: 53
End: 2018-10-01

## 2018-10-01 NOTE — TELEPHONE ENCOUNTER
PATIENT CALLED AND SAID THAT HE TAKE THE PANTOPRAZOLE 40MG BID  YOU CALLED IN PANTOPRAZOLE 20MG QD  PLEASE CALL PATIENT AND ADVISE  NEEDS TODAY   GOING OUT OF TOWN TOMORROW  HIS #   [phone]

## 2018-10-23 LAB — HBA1C MFR BLD HPLC: 12.6 %

## 2018-10-24 LAB
ALBUMIN SERPL-MCNC: 4.2 G/DL (ref 3.5–5.5)
ALBUMIN/GLOB SERPL: 1.6 {RATIO} (ref 1.2–2.2)
ALP SERPL-CCNC: 139 IU/L (ref 39–117)
ALT SERPL-CCNC: 40 IU/L (ref 0–44)
AST SERPL-CCNC: 34 IU/L (ref 0–40)
BASOPHILS # BLD AUTO: 0 X10E3/UL (ref 0–0.2)
BASOPHILS NFR BLD AUTO: 1 %
BILIRUB SERPL-MCNC: 0.6 MG/DL (ref 0–1.2)
BUN SERPL-MCNC: 16 MG/DL (ref 6–24)
BUN/CREAT SERPL: 12 (ref 9–20)
CALCIUM SERPL-MCNC: 9.3 MG/DL (ref 8.7–10.2)
CHLORIDE SERPL-SCNC: 95 MMOL/L (ref 96–106)
CHOLEST SERPL-MCNC: 126 MG/DL (ref 100–199)
CO2 SERPL-SCNC: 23 MMOL/L (ref 20–29)
CREAT SERPL-MCNC: 1.36 MG/DL (ref 0.76–1.27)
EOSINOPHIL # BLD AUTO: 0.4 X10E3/UL (ref 0–0.4)
EOSINOPHIL NFR BLD AUTO: 5 %
ERYTHROCYTE [DISTWIDTH] IN BLOOD BY AUTOMATED COUNT: 13.7 % (ref 12.3–15.4)
EST. AVERAGE GLUCOSE BLD GHB EST-MCNC: 315 MG/DL
GLOBULIN SER-MCNC: 2.6 G/DL (ref 1.5–4.5)
GLUCOSE SERPL-MCNC: 362 MG/DL (ref 65–99)
HBA1C MFR BLD: 12.6 % (ref 4.8–5.6)
HCT VFR BLD AUTO: 44.6 % (ref 37.5–51)
HDLC SERPL-MCNC: 36 MG/DL
HGB BLD-MCNC: 14.8 G/DL (ref 13–17.7)
IMM GRANULOCYTES # BLD: 0.1 X10E3/UL (ref 0–0.1)
IMM GRANULOCYTES NFR BLD: 1 %
LDLC SERPL CALC-MCNC: 69 MG/DL (ref 0–99)
LYMPHOCYTES # BLD AUTO: 2.2 X10E3/UL (ref 0.7–3.1)
LYMPHOCYTES NFR BLD AUTO: 25 %
MCH RBC QN AUTO: 29.2 PG (ref 26.6–33)
MCHC RBC AUTO-ENTMCNC: 33.2 G/DL (ref 31.5–35.7)
MCV RBC AUTO: 88 FL (ref 79–97)
MONOCYTES # BLD AUTO: 0.8 X10E3/UL (ref 0.1–0.9)
MONOCYTES NFR BLD AUTO: 9 %
NEUTROPHILS # BLD AUTO: 5.3 X10E3/UL (ref 1.4–7)
NEUTROPHILS NFR BLD AUTO: 59 %
PLATELET # BLD AUTO: 303 X10E3/UL (ref 150–379)
POTASSIUM SERPL-SCNC: 5.3 MMOL/L (ref 3.5–5.2)
PROT SERPL-MCNC: 6.8 G/DL (ref 6–8.5)
PSA SERPL-MCNC: 0.7 NG/ML (ref 0–4)
RBC # BLD AUTO: 5.06 X10E6/UL (ref 4.14–5.8)
SL AMB EGFR AFRICAN AMERICAN: 68 ML/MIN/1.73
SL AMB EGFR NON AFRICAN AMERICAN: 59 ML/MIN/1.73
SL AMB VLDL CHOLESTEROL CALC: 21 MG/DL (ref 5–40)
SODIUM SERPL-SCNC: 135 MMOL/L (ref 134–144)
TESTOST SERPL-MCNC: 249 NG/DL (ref 264–916)
TRIGL SERPL-MCNC: 107 MG/DL (ref 0–149)
TSH SERPL DL<=0.005 MIU/L-ACNC: 0.7 UIU/ML (ref 0.45–4.5)
WBC # BLD AUTO: 8.8 X10E3/UL (ref 3.4–10.8)

## 2018-10-25 ENCOUNTER — TELEPHONE (OUTPATIENT)
Dept: UROLOGY | Facility: CLINIC | Age: 53
End: 2018-10-25

## 2018-10-25 DIAGNOSIS — E29.1 TESTICULAR HYPOGONADISM: ICD-10-CM

## 2018-10-25 DIAGNOSIS — R79.89 LOW TESTOSTERONE: Primary | ICD-10-CM

## 2018-10-25 RX ORDER — TESTOSTERONE CYPIONATE 200 MG/ML
200 INJECTION INTRAMUSCULAR WEEKLY
Qty: 1 VIAL | Refills: 2 | Status: SHIPPED | OUTPATIENT
Start: 2018-10-25 | End: 2020-03-02 | Stop reason: CLARIF

## 2018-10-25 NOTE — TELEPHONE ENCOUNTER
Received patient's most recent blood work scanned from [facility]  Testosterone, CBC, and PSA reviewed  Patient was noted to have uncontrolled diabetes with hgb a1c 12 6 as well as elevated potassium of 5 3  Please contact patient to have him reach out to his PCP's office for further review and maintenance  Thank you

## 2018-10-25 NOTE — TELEPHONE ENCOUNTER
Testosterone is a controlled substance and cannot be to sent electronically to the pharmacy  Please contact the patient to see if he has enough medication to get him to his next appointment  If not we can print a prescription that he will have to  the office  Thank you  negative...

## 2018-10-25 NOTE — TELEPHONE ENCOUNTER
Spoke with the patient and advised him the RX for his testosterone can not be sent electronically to the pharmacy  He will  the RX at the [facility] office on Monday 10/29/18

## 2018-10-26 DIAGNOSIS — I10 BENIGN ESSENTIAL HYPERTENSION: Chronic | ICD-10-CM

## 2018-10-26 RX ORDER — AMLODIPINE BESYLATE 5 MG/1
TABLET ORAL
Qty: 180 TABLET | Refills: 1 | Status: SHIPPED | OUTPATIENT
Start: 2018-10-26 | End: 2019-04-14 | Stop reason: SDUPTHER

## 2018-10-29 ENCOUNTER — OFFICE VISIT (OUTPATIENT)
Dept: INTERNAL MEDICINE CLINIC | Facility: CLINIC | Age: 53
End: 2018-10-29
Payer: COMMERCIAL

## 2018-10-29 VITALS
SYSTOLIC BLOOD PRESSURE: 134 MMHG | DIASTOLIC BLOOD PRESSURE: 86 MMHG | HEART RATE: 100 BPM | WEIGHT: 283 LBS | HEIGHT: 69 IN | OXYGEN SATURATION: 95 % | BODY MASS INDEX: 41.92 KG/M2

## 2018-10-29 DIAGNOSIS — K43.9 VENTRAL HERNIA WITHOUT OBSTRUCTION OR GANGRENE: ICD-10-CM

## 2018-10-29 DIAGNOSIS — I10 BENIGN ESSENTIAL HYPERTENSION: Chronic | ICD-10-CM

## 2018-10-29 DIAGNOSIS — E78.2 MIXED HYPERLIPIDEMIA: ICD-10-CM

## 2018-10-29 DIAGNOSIS — E29.1 TESTICULAR HYPOGONADISM: ICD-10-CM

## 2018-10-29 DIAGNOSIS — E66.01 MORBID OBESITY (HCC): ICD-10-CM

## 2018-10-29 DIAGNOSIS — E11.9 TYPE 2 DIABETES MELLITUS WITHOUT COMPLICATION, WITHOUT LONG-TERM CURRENT USE OF INSULIN (HCC): Primary | ICD-10-CM

## 2018-10-29 PROCEDURE — 3079F DIAST BP 80-89 MM HG: CPT | Performed by: INTERNAL MEDICINE

## 2018-10-29 PROCEDURE — 3008F BODY MASS INDEX DOCD: CPT | Performed by: INTERNAL MEDICINE

## 2018-10-29 PROCEDURE — 3075F SYST BP GE 130 - 139MM HG: CPT | Performed by: INTERNAL MEDICINE

## 2018-10-29 PROCEDURE — 99214 OFFICE O/P EST MOD 30 MIN: CPT | Performed by: INTERNAL MEDICINE

## 2018-10-29 RX ORDER — INFLUENZA A VIRUS A/MICHIGAN/45/2015 X-275 (H1N1) ANTIGEN (FORMALDEHYDE INACTIVATED), INFLUENZA A VIRUS A/HONG KONG/4801/2014 X-263B (H3N2) ANTIGEN (FORMALDEHYDE INACTIVATED), INFLUENZA B VIRUS B/PHUKET/3073/2013 ANTIGEN (FORMALDEHYDE INACTIVATED), AND INFLUENZA B VIRUS B/BRISBANE/60/2008 ANTIGEN (FORMALDEHYDE INACTIVATED) 15; 15; 15; 15 UG/.5ML; UG/.5ML; UG/.5ML; UG/.5ML
INJECTION, SUSPENSION INTRAMUSCULAR
Refills: 0 | COMMUNITY
Start: 2018-10-25 | End: 2020-02-03

## 2018-10-29 NOTE — TELEPHONE ENCOUNTER
Reviewed with Allan Darby that patients' follow up is scheduled for 11/26/18,  Per marino bueno to call in a verbal prescription to pharmacy with 4 refills  Left message for patient to call office

## 2018-10-29 NOTE — TELEPHONE ENCOUNTER
Called and spoke to patient, advised we can authorize a total of 4 refills, advised he needs to be seen for his follow up appointment on 11/26/18 with repeat CBC and Testosterone level prior to visit for further refills  Patient states he is leaving out of town on business tomorrow and is returning the week of Jessica  Follow up rescheduled for 12/27/18 at 1 pm in [facility] with GAURANG Veronica, patient aware to have blood work done prior to visit  Lab slips emailed to patient at [email or web address]  I called the pharmacy, [facility] and spoke to pharmacist, patient provided pharmacy with written script ,  Advised patient can have a total of 4 refills  Patient is traveling out of town for the next month  Authorized pharmacy to dispense 4 vials with no refills as patient will be out of state and is need of his medication  Patient is aware no further refills/medication will be provided until seen in office for follow up with repeat blood work

## 2018-10-29 NOTE — TELEPHONE ENCOUNTER
Patient had picked up the prescription this morning in [place]  He is calling because he injects 1 ml total (200 mg ) weekly ,  He was only provided with 2 refills  He is calling to question why he was only given 2 refills as this is only a 3 week supply and states he is at pharmacy now ,  He has the written script  He is flying out of town tomorrow  He would like follow up this afternoon  Advised him I will message provider

## 2018-10-29 NOTE — TELEPHONE ENCOUNTER
Patient was provided with a refill until his follow up appointment in 3 weeks with repeat blood work  Since testosterone is a controlled substance, he must follow up every 6 months with a CBC and testosterone prior to visit for surveillance

## 2018-10-29 NOTE — PROGRESS NOTES
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 and could not watch his diet  He was told to cut down on the carbohydrates  Januvia was added to the metformin and glipizide  I will recheck labs in 3 months     - CBC and differential; Future  - Comprehensive metabolic panel; Future  - TSH, 3rd generation; Future  - Hemoglobin A1C; Future  - Microalbumin / creatinine urine ratio; Future  - sitaGLIPtin (JANUVIA) 100 mg tablet; Take 1 tablet (100 mg total) by mouth daily  Dispense: 90 tablet; Refill: 1    2  Benign essential hypertension  Continue present medication    3  Mixed hyperlipidemia  Continue atorvastatin  - Lipid panel; Future    4  Testicular hypogonadism  Continue testosterone supplement    5  Morbid obesity ([address] )  He was told to cut down on the calories and increase his activity in order to lose weight    6  Ventral hernia without obstruction or gangrene  He wants to get operated upon in spring next year      - Counseling Documentation: patient was counseled regarding: diagnostic results, instructions for management, risk factor reductions, prognosis, patient and family education, impressions, risks and benefits of treatment options and importance of compliance with treatment  - Medication Side Effects: Adverse side effects of medications were reviewed with the patient/guardian today  Return for follow up visit in 3 months or earlier, if needed        Chief Complaint:  Chief Complaint   Patient presents with    Well Check     Routine         History of Present Illness: Type 2 diabetes-patient was very well controlled 6 months ago and now the hemoglobin A1c is 12 6  He said he was not watching his diet at all as he was traveling from 1 place to the other for work  He was also not able to exercise  Hypertension-blood pressure stable on present occasion  Hyperlipidemia-he takes atorvastatin regularly  Testosterone deficiency-he takes testosterone regularly  Morbid obesity-he has not been able to exercise for the last 6 months but surprisingly has not gained weight  Ventral hernia-he has a ventral hernia and wants to get operated upon in a couple of months  Active Problem List:  Patient Active Problem List   Diagnosis    Type 2 diabetes mellitus without complication, without long-term current use of insulin ([facility] )    Benign essential hypertension    Mixed hyperlipidemia    Erectile dysfunction    Hernia, ventral    Low testosterone    Testicular hypogonadism    Morbid obesity ([facility] )         Past Medical History:  Past Medical History:   Diagnosis Date    Acute renal failure ([place] [address] )     [id number] resolved    Diabetes mellitus ([place] [address] )     Enteritis 8/23/2016    [facility] to [facility]  ) 8/23/2016    HLD (hyperlipidemia) 8/23/2016    HTN (hypertension), benign 8/23/2016    Hyperlipidemia     Hypertension     Intractable vomiting with nausea 8/23/2016    Regional enteritis of small bowel ([facility] ) 8/23/2016         Past Surgical History:  Past Surgical History:   Procedure Laterality Date    ESOPHAGOGASTRODUODENOSCOPY N/A 8/24/2016    Procedure: ESOPHAGOGASTRODUODENOSCOPY (EGD); Surgeon: Katharine Pool MD;  [facility];   Service:     UMBILICAL HERNIA REPAIR           Family History:  Family History   Problem Relation Age of Onset    Diabetes Mother         mellitus    Lung cancer Mother     Coronary artery disease Father          Social History:  Social History     Social History    Marital status: /Civil Union     Spouse name: N/A   Kimberly Lal Number of children: N/A    Years of education: N/A     Social History Main Topics    Smoking status: Never Smoker    Smokeless tobacco: Never Used    Alcohol use Yes      Comment: Rarely    Drug use: No    Sexual activity: Yes     Partners: Female     Other Topics Concern    None     Social History Narrative    None         Allergies: Allergies   Allergen Reactions    Shellfish-Derived Products Anaphylaxis    Erythromycin GI Intolerance         Medications:    Current Outpatient Prescriptions:     amLODIPine (NORVASC) 5 mg tablet, TAKE 1 TABLET BY MOUTH TWICE A DAY, Disp: 180 tablet, Rfl: 1    aspirin 81 MG tablet, Take 81 mg by mouth daily  , Disp: , Rfl:     atorvastatin (LIPITOR) 40 mg tablet, TAKE 1 TABLET BY MOUTH EVERY DAY, Disp: 90 tablet, Rfl: 2    glyBURIDE-metFORMIN (GLUCOVANCE) 5-500 MG per tablet, Take 2 tablets by mouth 2 (two) times a day with meals, Disp: 360 tablet, Rfl: 3    lisinopril (ZESTRIL) 40 mg tablet, TAKE 1 TABLET DAILY  , Disp: 90 tablet, Rfl: 1    loratadine-pseudoephedrine (CLARITIN-D 24-HOUR)  mg per 24 hr tablet, Take 1 tablet by mouth daily, Disp: 30 tablet, Rfl: 3    methocarbamol (ROBAXIN) 750 mg tablet, TK 1 T PO TID PRN FOR SPASMS, Disp: , Rfl: 0    metoclopramide (REGLAN) 10 mg tablet, Take 10 mg by mouth, Disp: , Rfl:     Needle, Disp, (HYPODERMIC NEEDLE) 23G X 1-1/2" MISC, by Does not apply route once a week, Disp: 10 each, Rfl: 6    pantoprazole (PROTONIX) 20 mg tablet, Take 1 tablet (20 mg total) by mouth daily (Patient taking differently: Take 40 mg by mouth 2 (two) times a day  ), Disp: 90 tablet, Rfl: 1    sildenafil (REVATIO) 20 mg tablet, TAKE ONE TO FIVE tablets needed directed by physician, Disp: , Rfl: 0    testosterone cypionate (DEPO-TESTOSTERONE) 200 mg/mL SOLN, Inject 1 mL (200 mg total) into a muscle once a week, Disp: 1 vial, Rfl: 2    atorvastatin (LIPITOR) 20 mg tablet, Take 40 mg by mouth daily    , Disp: , Rfl:     Avanafil (STENDRA) 100 MG TABS, Take 1 tablet (100 mg total) by mouth as needed (intercourse) for up to 6 doses (Patient not taking: Reported on 10/29/2018 ), Disp: 6 tablet, Rfl: 11    FLUZONE QUADRIVALENT 0 5 ML TIM, ADM 0 5ML IM UTD, Disp: , Rfl: 0    lisinopril (ZESTRIL) 20 mg tablet, Take 40 mg by mouth 2 (two) times a day   , Disp: , Rfl:     sitaGLIPtin (JANUVIA) 100 mg tablet, Take 1 tablet (100 mg total) by mouth daily, Disp: 90 tablet, Rfl: 1      The following portions of the patient's history were reviewed and updated as appropriate: past medical history, past surgical history, family history, social history, allergies, current medications and active problem list       Review of Systems:  Constitutional: Denies fever, chills, weight gain, weight loss, fatigue  Eyes: Denies eye redness, eye discharge, double vision, change in visual acuity  ENT: Denies hearing loss, tinnitus, sneezing, nasal congestion, nasal discharge, sore throat   Respiratory: Denies cough, expectoration, hemoptysis, shortness of breath, wheezing  Cardiovascular: Denies chest pain, palpitations, lower extremity swelling, orthopnea, PND  Gastrointestinal: Denies abdominal pain, heartburn, nausea, vomiting, hematemesis, diarrhea, bloody stools   Has a ventral hernia  Genito-Urinary: Denies dysuria, frequency, difficulty in micturition, nocturia, incontinence  Musculoskeletal: Denies back pain, joint pain, muscle pain  Neurologic: Denies confusion, lightheadedness, syncope, headache, focal weakness, sensory changes, seizures  Endocrine: Denies polyuria, polydipsia, temperature intolerance  Allergy and Immunology: Denies hives, insect bite sensitivity  Hematological and Lymphatic: Denies bleeding problems, swollen glands   Psychological: Denies depression, suicidal ideation, anxiety, panic, mood swings  Dermatological: Denies pruritus, rash, skin lesion changes      Vitals:  Vitals:    10/29/18 0904   BP: 134/86   Pulse: 100   SpO2: 95% Body mass index is 41 79 kg/m²  Weight (last 2 days)     Date/Time   Weight    10/29/18 0904  128 (283)                Physical Examination:  General: Patient is not in acute distress  Awake, alert, responding to commands  No weight gain or loss  Head: Normocephalic  Atraumatic  Eyes: Conjunctiva and lids with no swelling, erythema or discharge  Both pupils normal sized, round and reactive to light  Sclera nonicteric  ENT: External examination of nose and ear normal  Otoscopic examination shows translucent tympanic membranes with patent canals without erythema  Oropharynx moist with no erythema, edema, exudate or lesions  Neck: Supple  JVP not raised  Trachea midline  No masses  No thyromegaly  Lungs: No signs of increased work of breathing or respiratory distress  Bilateral bronchovascular breath sounds with no crackles or rhonchi  Chest wall: No tenderness  Cardiovascular: Normal PMI  No thrills  Regular rate and rhythm  S1 and S2 normal  No murmur, rub or gallop  Gastrointestinal: Abdomen soft, nontender  No guarding or rigidity  Liver and spleen not palpable  Bowel sounds present  Neurologic: Cranial nerves II-XII intact   Cortical functions normal  Motor system - Reflexes 2+ and symmetrical  Sensations normal  Musculoskeletal: Gait normal  No joint tenderness  Integumentary: Skin normal with no rash or lesions  Lymphatic: No palpable lymph nodes in neck, axilla or groin  Extremities: No clubbing, cyanosis, edema or varicosities  Psychological: Judgement and insight normal  Mood and affect normal      Laboratory Results:  CBC with diff:   Lab Results   Component Value Date    WBC 10 8 07/21/2017    RBC 4 53 08/25/2016    HGB 13 6 07/21/2017    HCT 39 3 07/21/2017    MCV 87 07/21/2017    MCH 30 2 07/21/2017    RDW 13 6 07/21/2017     07/21/2017       CMP:  Lab Results   Component Value Date    CREATININE 1 18 05/02/2017    BUN 23 05/02/2017     05/02/2017    K 4 5 05/02/2017     05/02/2017    CO2 21 05/02/2017    GLUCOSE 118 (H) 05/02/2017    PROT 6 2 04/10/2017    ALKPHOS 94 04/10/2017    ALT 26 04/10/2017    AST 25 04/10/2017       Lab Results   Component Value Date    HGBA1C 12 6 10/23/2018    HGBA1C 6 6 (H) 04/10/2017       No results found for: TROPONINI, CKMB, CKTOTAL    Lipid Profile:   Lab Results   Component Value Date    CHOL 91 (L) 04/10/2017    CHOL 97 (L) 07/18/2016     Lab Results   Component Value Date    HDL 29 (L) 04/10/2017    HDL 38 (L) 07/18/2016     Lab Results   Component Value Date    LDLCALC 44 04/10/2017    LDLCALC 40 07/18/2016     Lab Results   Component Value Date    TRIG 91 04/10/2017    TRIG 93 07/18/2016         Health Maintenance:  Health Maintenance   Topic Date Due    CRC Screening: Colonoscopy  1965    DM Eye Exam  07/28/1974    Pneumococcal PPSV23 Medium Risk Adult (1 of 1 - PPSV23) 07/28/1983    DTaP,Tdap,and Td Vaccines (1 - Tdap) 07/28/1985    URINE MICROALBUMIN  07/18/2017    INFLUENZA VACCINE  07/01/2018    Depression Screening PHQ  04/17/2019    Diabetic Foot Exam  04/17/2019    HEMOGLOBIN A1C  04/23/2019     There is no immunization history for the selected administration types on file for this patient        Pete Giordano MD  10/29/2018,9:27 AM

## 2018-12-20 ENCOUNTER — TELEPHONE (OUTPATIENT)
Dept: UROLOGY | Facility: AMBULATORY SURGERY CENTER | Age: 53
End: 2018-12-20

## 2018-12-20 NOTE — TELEPHONE ENCOUNTER
This is a Dr Hairston patient seen at [facility]  This patients Testosterone is approved with the insurance good until 12/18/19, I will call the patient to let him know  Thanks  Odin Pizarro

## 2019-01-02 ENCOUNTER — TELEPHONE (OUTPATIENT)
Dept: INTERNAL MEDICINE CLINIC | Facility: CLINIC | Age: 54
End: 2019-01-02

## 2019-01-02 NOTE — TELEPHONE ENCOUNTER
Patient called needing Dr Gloria to fill out [employer] comp paperwork    He saw her about a year ago and she told him he had a hernia and referred him to a surgeon    He now needs the [employer] comp paperwork filled out    Patient is away, please call him at # [phone]

## 2019-01-03 NOTE — TELEPHONE ENCOUNTER
Patient called back  Said we need to fill out a C4 Authorization from  He gave me the information needed for the form listed below intheis message: I explained to him that normally when there's a form involved there's usually an appointment as well- he said just let him know and if he needs to get a copy of the form to let him know and he will work on it- Please call him  #[phone]    Information for form:  [address] [place] [id number]  [phone]  #[phone]    Employer   [employer]  Carrier Code#[id number]  [facility] comp policy # [id number]  His Bahman Dolan claim #-  Conformation #[id number]    The claim's technician who's handling his case is Ludwig Vargas  SB#[phone] Email is [email or web address]

## 2019-01-10 LAB
CREAT ?TM UR-SCNC: 108.2 UMOL/L
HBA1C MFR BLD HPLC: 8.6 %
MICROALBUMIN UR-MCNC: 177.2 MG/L (ref 0–20)
MICROALBUMIN/CREAT UR: 163.8 MG/G{CREAT}

## 2019-01-11 ENCOUNTER — TELEPHONE (OUTPATIENT)
Dept: INTERNAL MEDICINE CLINIC | Facility: CLINIC | Age: 54
End: 2019-01-11

## 2019-01-11 LAB — TESTOST SERPL-MCNC: 498 NG/DL (ref 264–916)

## 2019-01-11 NOTE — TELEPHONE ENCOUNTER
Madisyn from [facility] called regarding pt's workers comp case  He has an appt on 1/15/19 with Dr Leah Chacon   They will be faxing over form for  to fill out FYI

## 2019-01-14 LAB
ALBUMIN SERPL-MCNC: 4 G/DL (ref 3.5–5.5)
ALBUMIN/CREAT UR: 163.8 MG/G CREAT (ref 0–30)
ALBUMIN/GLOB SERPL: 1.7 {RATIO} (ref 1.2–2.2)
ALP SERPL-CCNC: 107 IU/L (ref 39–117)
ALT SERPL-CCNC: 31 IU/L (ref 0–44)
AST SERPL-CCNC: 22 IU/L (ref 0–40)
BASOPHILS # BLD AUTO: 0.1 X10E3/UL (ref 0–0.2)
BASOPHILS NFR BLD AUTO: 1 %
BILIRUB SERPL-MCNC: 0.3 MG/DL (ref 0–1.2)
BUN SERPL-MCNC: 21 MG/DL (ref 6–24)
BUN/CREAT SERPL: 16 (ref 9–20)
CALCIUM SERPL-MCNC: 9.4 MG/DL (ref 8.7–10.2)
CHLORIDE SERPL-SCNC: 102 MMOL/L (ref 96–106)
CHOLEST SERPL-MCNC: 105 MG/DL (ref 100–199)
CO2 SERPL-SCNC: 21 MMOL/L (ref 20–29)
CREAT SERPL-MCNC: 1.3 MG/DL (ref 0.76–1.27)
CREAT UR-MCNC: 108.2 MG/DL
EOSINOPHIL # BLD AUTO: 0.8 X10E3/UL (ref 0–0.4)
EOSINOPHIL NFR BLD AUTO: 7 %
ERYTHROCYTE [DISTWIDTH] IN BLOOD BY AUTOMATED COUNT: 13.5 % (ref 12.3–15.4)
GLOBULIN SER-MCNC: 2.4 G/DL (ref 1.5–4.5)
GLUCOSE SERPL-MCNC: 210 MG/DL (ref 65–99)
HBA1C MFR BLD: 8.6 % (ref 4.8–5.6)
HCT VFR BLD AUTO: 43.5 % (ref 37.5–51)
HDLC SERPL-MCNC: 37 MG/DL
HGB BLD-MCNC: 14.4 G/DL (ref 13–17.7)
IMM GRANULOCYTES # BLD: 0.1 X10E3/UL (ref 0–0.1)
IMM GRANULOCYTES NFR BLD: 1 %
LDLC SERPL CALC-MCNC: 46 MG/DL (ref 0–99)
LYMPHOCYTES # BLD AUTO: 2.5 X10E3/UL (ref 0.7–3.1)
LYMPHOCYTES NFR BLD AUTO: 22 %
MCH RBC QN AUTO: 28.7 PG (ref 26.6–33)
MCHC RBC AUTO-ENTMCNC: 33.1 G/DL (ref 31.5–35.7)
MCV RBC AUTO: 87 FL (ref 79–97)
MICROALBUMIN UR-MCNC: 177.2 UG/ML
MONOCYTES # BLD AUTO: 1.1 X10E3/UL (ref 0.1–0.9)
MONOCYTES NFR BLD AUTO: 10 %
NEUTROPHILS # BLD AUTO: 6.6 X10E3/UL (ref 1.4–7)
NEUTROPHILS NFR BLD AUTO: 59 %
PLATELET # BLD AUTO: 304 X10E3/UL (ref 150–379)
POTASSIUM SERPL-SCNC: 4.3 MMOL/L (ref 3.5–5.2)
PROT SERPL-MCNC: 6.4 G/DL (ref 6–8.5)
RBC # BLD AUTO: 5.01 X10E6/UL (ref 4.14–5.8)
SL AMB EGFR AFRICAN AMERICAN: 72 ML/MIN/1.73
SL AMB EGFR NON AFRICAN AMERICAN: 62 ML/MIN/1.73
SL AMB VLDL CHOLESTEROL CALC: 22 MG/DL (ref 5–40)
SODIUM SERPL-SCNC: 140 MMOL/L (ref 134–144)
TRIGL SERPL-MCNC: 112 MG/DL (ref 0–149)
TSH SERPL DL<=0.005 MIU/L-ACNC: 0.86 UIU/ML (ref 0.45–4.5)
WBC # BLD AUTO: 11.2 X10E3/UL (ref 3.4–10.8)

## 2019-01-15 ENCOUNTER — OFFICE VISIT (OUTPATIENT)
Dept: INTERNAL MEDICINE CLINIC | Facility: CLINIC | Age: 54
End: 2019-01-15
Payer: OTHER MISCELLANEOUS

## 2019-01-15 ENCOUNTER — OFFICE VISIT (OUTPATIENT)
Dept: SURGERY | Facility: CLINIC | Age: 54
End: 2019-01-15
Payer: OTHER MISCELLANEOUS

## 2019-01-15 VITALS
OXYGEN SATURATION: 95 % | SYSTOLIC BLOOD PRESSURE: 140 MMHG | WEIGHT: 288 LBS | DIASTOLIC BLOOD PRESSURE: 90 MMHG | HEART RATE: 100 BPM | BODY MASS INDEX: 42.53 KG/M2

## 2019-01-15 VITALS
BODY MASS INDEX: 42.65 KG/M2 | HEART RATE: 100 BPM | TEMPERATURE: 98.1 F | DIASTOLIC BLOOD PRESSURE: 90 MMHG | RESPIRATION RATE: 19 BRPM | WEIGHT: 288 LBS | SYSTOLIC BLOOD PRESSURE: 140 MMHG | HEIGHT: 69 IN

## 2019-01-15 DIAGNOSIS — I10 BENIGN ESSENTIAL HYPERTENSION: Chronic | ICD-10-CM

## 2019-01-15 DIAGNOSIS — K43.9 VENTRAL HERNIA WITHOUT OBSTRUCTION OR GANGRENE: ICD-10-CM

## 2019-01-15 DIAGNOSIS — K43.9 VENTRAL HERNIA WITHOUT OBSTRUCTION OR GANGRENE: Primary | ICD-10-CM

## 2019-01-15 DIAGNOSIS — K42.0 INCARCERATED UMBILICAL HERNIA: Primary | ICD-10-CM

## 2019-01-15 DIAGNOSIS — E11.9 TYPE 2 DIABETES MELLITUS WITHOUT COMPLICATION, WITHOUT LONG-TERM CURRENT USE OF INSULIN (HCC): ICD-10-CM

## 2019-01-15 DIAGNOSIS — E66.01 MORBID OBESITY (HCC): ICD-10-CM

## 2019-01-15 DIAGNOSIS — E78.2 MIXED HYPERLIPIDEMIA: ICD-10-CM

## 2019-01-15 PROCEDURE — 99214 OFFICE O/P EST MOD 30 MIN: CPT | Performed by: INTERNAL MEDICINE

## 2019-01-15 PROCEDURE — 99243 OFF/OP CNSLTJ NEW/EST LOW 30: CPT | Performed by: SURGERY

## 2019-01-15 NOTE — PROGRESS NOTES
INTERNAL MEDICINE OFFICE VISIT  [facility] AT [address]  Tel: [phone]      NAME: Tammy Burntet  AGE: 46 y o  SEX: male  : 1965   MRN: [id number]    DATE: 1/15/2019  TIME: 10:37 AM      Assessment and Plan:  1  Ventral hernia without obstruction or gangrene  He was told to make an appointment with surgery  - Ambulatory referral to General Surgery; Future    2  Type 2 diabetes mellitus without complication, without long-term current use of insulin (HCC)  Continue Januvia and glyburide and metformin  He was told to cut down on the carbohydrate intake in his diet  - sitaGLIPtin (JANUVIA) 100 mg tablet; Take 1 tablet (100 mg total) by mouth daily  Dispense: 90 tablet; Refill: 1  - CBC and differential; Future  - Comprehensive metabolic panel; Future  - Lipid panel; Future  - TSH, 3rd generation; Future  - Hemoglobin A1C; Future  - Microalbumin / creatinine urine ratio; Future    3  Hypertension  Blood pressure stable on present medication  Continue the same medications  4  Hyperlipidemia  Continue atorvastatin  5  Morbid obesity  BMI Counseling: Body mass index is 42 53 kg/m²  Discussed the patient's BMI with him  The BMI is above average  BMI counseling and education was provided to the patient  Nutrition recommendations include reducing portion sizes, decreasing overall calorie intake and moderation in carbohydrate intake  Exercise recommendations include moderate aerobic physical activity for 150 minutes/week  Return for follow up visit in 4 months or earlier, if needed  Chief Complaint:  Chief Complaint   Patient presents with    Follow-up    Hernia     worker comp          History of Present Illness:   Ventral hernia-he has been having worsening symptoms of the hernia and now has a lot of abdominal pain when lifting weight at work    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  Type 2 diabetes-his hemoglobin A1c is 8 6 which is much better than 12 6 about 3 months ago  The improvement happened after Jaylanuvia was added to his medication list   He still does not watch his carbohydrate intake in his diet as he is touring all the time for work  Hypertension-blood pressure stable on present medication  Hyperlipidemia-he takes atorvastatin regularly  Morbid obesity-he has not been able to lose weight  Active Problem List:  Patient Active Problem List   Diagnosis    Type 2 diabetes mellitus without complication, without long-term current use of insulin ([place] [address] )    Benign essential hypertension    Mixed hyperlipidemia    Erectile dysfunction    Hernia, ventral    Low testosterone    Testicular hypogonadism    Morbid obesity ([place] [address] )         Past Medical History:  Past Medical History:   Diagnosis Date    Acute renal failure ([place] [address] )     [id number] resolved    Diabetes mellitus ([place] [address] )     Enteritis 8/23/2016    Gastroparesis due to DM ([place] [address] ) 8/23/2016    HLD (hyperlipidemia) 8/23/2016    HTN (hypertension), benign 8/23/2016    Hyperlipidemia     Hypertension     Intractable vomiting with nausea 8/23/2016    Regional enteritis of small bowel ([place] [address] ) 8/23/2016         Past Surgical History:  Past Surgical History:   Procedure Laterality Date    ESOPHAGOGASTRODUODENOSCOPY N/A 8/24/2016    Procedure: ESOPHAGOGASTRODUODENOSCOPY (EGD); Surgeon: Tracy Allen MD;  [facility];   Service:     UMBILICAL HERNIA REPAIR           Family History:  Family History   Problem Relation Age of Onset    Diabetes Mother         mellitus    Lung cancer Mother     Coronary artery disease Father          Social History:  Social History     Social History    Marital status: /Civil Union     Spouse name: N/A    Number of children: N/A    Years of education: N/A     Social History Main Topics    Smoking status: Never Smoker    Smokeless tobacco: Never Used    Alcohol use Yes      Comment: Rarely    Drug use: No    Sexual activity: Yes     Partners: Female     Other Topics Concern    None     Social History Narrative    None         Allergies: Allergies   Allergen Reactions    Shellfish-Derived Products Anaphylaxis    Erythromycin GI Intolerance         Medications:    Current Outpatient Prescriptions:     amLODIPine (NORVASC) 5 mg tablet, TAKE 1 TABLET BY MOUTH TWICE A DAY, Disp: 180 tablet, Rfl: 1    aspirin 81 MG tablet, Take 81 mg by mouth daily  , Disp: , Rfl:     atorvastatin (LIPITOR) 20 mg tablet, Take 40 mg by mouth daily  , Disp: , Rfl:     atorvastatin (LIPITOR) 40 mg tablet, TAKE 1 TABLET BY MOUTH EVERY DAY, Disp: 90 tablet, Rfl: 2    FLUZONE QUADRIVALENT 0 5 ML TIM, ADM 0 5ML IM UTD, Disp: , Rfl: 0    glyBURIDE-metFORMIN (GLUCOVANCE) 5-500 MG per tablet, Take 2 tablets by mouth 2 (two) times a day with meals, Disp: 360 tablet, Rfl: 3    lisinopril (ZESTRIL) 40 mg tablet, TAKE 1 TABLET DAILY  , Disp: 90 tablet, Rfl: 1    loratadine-pseudoephedrine (CLARITIN-D 24-HOUR)  mg per 24 hr tablet, Take 1 tablet by mouth daily, Disp: 30 tablet, Rfl: 3    metoclopramide (REGLAN) 10 mg tablet, Take 10 mg by mouth, Disp: , Rfl:     Needle, Disp, (HYPODERMIC NEEDLE) 23G X 1-1/2" MISC, by Does not apply route once a week, Disp: 10 each, Rfl: 6    pantoprazole (PROTONIX) 20 mg tablet, Take 1 tablet (20 mg total) by mouth daily (Patient taking differently: Take 40 mg by mouth 2 (two) times a day  ), Disp: 90 tablet, Rfl: 1    sildenafil (REVATIO) 20 mg tablet, TAKE ONE TO FIVE tablets needed directed by physician, Disp: , Rfl: 0    sitaGLIPtin (JANUVIA) 100 mg tablet, Take 1 tablet (100 mg total) by mouth daily, Disp: 90 tablet, Rfl: 1    testosterone cypionate (DEPO-TESTOSTERONE) 200 mg/mL SOLN, Inject 1 mL (200 mg total) into a muscle once a week, Disp: 1 vial, Rfl: 2    Avanafil (STENDRA) 100 MG TABS, Take 1 tablet (100 mg total) by mouth as needed (intercourse) for up to 6 doses (Patient not taking: Reported on 10/29/2018 ), Disp: 6 tablet, Rfl: 11      The following portions of the patient's history were reviewed and updated as appropriate: past medical history, past surgical history, family history, social history, allergies, current medications and active problem list       Review of Systems:  Constitutional: Denies fever, chills, weight gain, weight loss, fatigue  Eyes: Denies eye redness, eye discharge, double vision, change in visual acuity  ENT: Denies hearing loss, tinnitus, sneezing, nasal congestion, nasal discharge, sore throat   Respiratory: Denies cough, expectoration, hemoptysis, shortness of breath, wheezing  Cardiovascular: Denies chest pain, palpitations, lower extremity swelling, orthopnea, PND  Gastrointestinal: Denies abdominal pain, heartburn, nausea, vomiting, hematemesis, diarrhea, bloody stools  Has a ventral hernia  Genito-Urinary: Denies dysuria, frequency, difficulty in micturition, nocturia, incontinence  Musculoskeletal: Denies back pain, joint pain, muscle pain  Neurologic: Denies confusion, lightheadedness, syncope, headache, focal weakness, sensory changes, seizures  Endocrine: Denies polyuria, polydipsia, temperature intolerance  Allergy and Immunology: Denies hives, insect bite sensitivity  Hematological and Lymphatic: Denies bleeding problems, swollen glands   Psychological: Denies depression, suicidal ideation, anxiety, panic, mood swings  Dermatological: Denies pruritus, rash, skin lesion changes      Vitals:  Vitals:    01/15/19 1021   BP: 140/90   Pulse: 100   SpO2: 95%       Body mass index is 42 53 kg/m²  Weight (last 2 days)     Date/Time   Weight    01/15/19 1021  131 (288)                Physical Examination:  General: Patient is not in acute distress  Awake, alert, responding to commands  No weight gain or loss  Head: Normocephalic  Atraumatic  Eyes: Conjunctiva and lids with no swelling, erythema or discharge  Both pupils normal sized, round and reactive to light  Sclera nonicteric  ENT: External examination of nose and ear normal  Otoscopic examination shows translucent tympanic membranes with patent canals without erythema  Oropharynx moist with no erythema, edema, exudate or lesions  Neck: Supple  JVP not raised  Trachea midline  No masses  No thyromegaly  Lungs: No signs of increased work of breathing or respiratory distress  Bilateral bronchovascular breath sounds with no crackles or rhonchi  Chest wall: No tenderness  Cardiovascular: Normal PMI  No thrills  Regular rate and rhythm  S1 and S2 normal  No murmur, rub or gallop  Gastrointestinal: Abdomen soft, nontender  No guarding or rigidity  Liver and spleen not palpable  Bowel sounds present  Ventral hernia present  Neurologic: Cranial nerves II-XII intact   Cortical functions normal  Motor system - Reflexes 2+ and symmetrical  Sensations normal  Musculoskeletal: Gait normal  No joint tenderness  Integumentary: Skin normal with no rash or lesions  Lymphatic: No palpable lymph nodes in neck, axilla or groin  Extremities: No clubbing, cyanosis, edema or varicosities  Psychological: Judgement and insight normal  Mood and affect normal      Laboratory Results:  CBC with diff:   Lab Results   Component Value Date    WBC 11 2 (H) 01/10/2019    WBC 10 8 07/21/2017    RBC 5 01 01/10/2019    RBC 4 53 08/25/2016    HGB 14 4 01/10/2019    HGB 13 6 07/21/2017    HCT 43 5 01/10/2019    HCT 39 3 07/21/2017    MCV 87 01/10/2019    MCV 87 07/21/2017    MCH 28 7 01/10/2019    MCH 30 2 07/21/2017    RDW 13 5 01/10/2019    RDW 13 6 07/21/2017     01/10/2019     07/21/2017       CMP:  Lab Results   Component Value Date    CREATININE 1 18 05/02/2017    BUN 21 01/10/2019     05/02/2017    K 4 3 01/10/2019     01/10/2019    CO2 21 01/10/2019    GLUCOSE 118 (H) 05/02/2017    PROT 6 2 04/10/2017    ALKPHOS 94 04/10/2017    ALT 26 04/10/2017    AST 25 04/10/2017 Lab Results   Component Value Date    HGBA1C 8 6 (H) 01/10/2019       No results found for: TROPONINI, CKMB, CKTOTAL    Lipid Profile:   Lab Results   Component Value Date    CHOL 91 (L) 04/10/2017    CHOL 97 (L) 07/18/2016     Lab Results   Component Value Date    HDL 37 (L) 01/10/2019    HDL 36 (L) 10/23/2018     Lab Results   Component Value Date    LDLCALC 44 04/10/2017    LDLCALC 40 07/18/2016     Lab Results   Component Value Date    TRIG 112 01/10/2019    TRIG 107 10/23/2018         Health Maintenance:  Health Maintenance   Topic Date Due    Hepatitis C Screening  1965    CRC Screening: Colonoscopy  1965    Pneumococcal PPSV23 Medium Risk Adult (1 of 1 - PPSV23) 07/28/1983    DTaP,Tdap,and Td Vaccines (1 - Tdap) 07/28/1985    INFLUENZA VACCINE  07/01/2018    Depression Screening PHQ  04/17/2019    Diabetic Foot Exam  04/17/2019    DM Eye Exam  04/17/2019    HEMOGLOBIN A1C  07/10/2019    URINE MICROALBUMIN  01/10/2020     There is no immunization history for the selected administration types on file for this patient        Kellen Evans MD  1/15/2019,10:37 AM

## 2019-01-15 NOTE — PATIENT INSTRUCTIONS
Umbilical Hernia   WHAT YOU NEED TO KNOW:   What is an umbilical hernia? An umbilical hernia is a bulge through the abdominal muscles in the area of the navel (belly button)  The hernia may contain tissue from the abdomen, part of an organ (such as the intestine), or fluid  What increases my risk for an umbilical hernia? Umbilical hernias usually happen because of a hole or a weak area in the muscles of the abdominal wall  This can happen at any age  Umbilical hernias happen more often in women than in men  You may be more likely to have a hernia if other family members have them  You may also have an increased risk if you have any of the following:  · Overweight     · Ascites, which is fluid in the abdomen     · A large growth in your abdomen     · Pregnancy now or at any time in the past     · A very long labor when delivering your baby  What are the signs and symptoms of an umbilical hernia? · The most common sign is a bulge or swelling in the area of the navel  You may be able to see the lump, or you may feel it when you gently press on your navel  · The bulge may get bigger when you bend, cough, or strain to have a bowel movement  The bulge may get smaller and hurt less when you lie down  · You may have pain or burning in your abdomen  The pain may get worse when you cough, sneeze, lift, or stand for a long time  · The skin over the bulge may be swollen and red, gray, or blue  How is an umbilical hernia diagnosed? Your healthcare provider will usually find the hernia during an exam  Your healthcare provider may check to see if the hernia can be reduced (gently pushed back into the abdomen)  You may need tests, such as x-rays of the abdomen or an ultrasound  These tests will help healthcare providers decide how to treat your hernia  How is an umbilical hernia treated? · Surgery:  Most adults with umbilical hernias will need surgery to fix their hernias   Always tell your healthcare provider if you have new or worse pain in the area of your hernia  You may need emergency surgery if a loop of intestine becomes trapped in the hernia  · Ibuprofen or acetaminophen:  These medicines decrease pain  They are available without a doctor's order  Ask your healthcare provider which medicine is right for you  Ask how much to take and how often to take it  Follow directions  These medicines can cause stomach bleeding if not taken correctly  Ibuprofen can cause kidney damage  Do not take ibuprofen if you have kidney disease, an ulcer, or allergies to aspirin  Acetaminophen can cause liver damage  Do not drink alcohol if you take acetaminophen  What are the risks of an umbilical hernia? If your hernia is not treated, the following serious problems may happen:  · Incarcerated hernia: This happens when your healthcare provider cannot push your hernia back into your abdomen  The tissue becomes stuck or trapped, which can cause serious problems  Umbilical hernias have a high risk of becoming incarcerated  If this happens, your intestines may become blocked  You may have very bad pain in your abdomen  You may also have nausea or vomiting  · Strangulated hernia:  A loop of intestine in the hernia may become pinched or strangulated  This means that the blood supply to that area of intestine is decreased or cut off  If this happens, you may feel very bad pain in your abdomen  Other signs include nausea, vomiting, or a fever  You may have constipation or blood in your bowel movements  If your intestine becomes blocked, you may not be able to pass gas or have a bowel movement  If the hernia is not treated right away, that part of your intestine may die  This is called gangrene, and it can be life-threatening  When should I contact my healthcare provider? · You have nausea or vomiting  · You cannot gently push your hernia back into your abdomen      · You are constipated or have blood in your bowel movements  · Your hernia is getting bigger  · You have questions or concerns about your condition or care  When should I seek immediate care or call 911? · You have a fever  · Your hernia is stuck outside the abdomen and is painful, swollen, or feels hard  · You completely stop having bowel movements and stop passing gas  · Your abdominal pain is bad or getting worse  CARE AGREEMENT:   You have the right to help plan your care  Learn about your health condition and how it may be treated  Discuss treatment options with your caregivers to decide what care you want to receive  You always have the right to refuse treatment  The above information is an  only  It is not intended as medical advice for individual conditions or treatments  Talk to your doctor, nurse or pharmacist before following any medical regimen to see if it is safe and effective for you  © 2017 [address] [employer] Information is for End User's use only and may not be sold, redistributed or otherwise used for commercial purposes  All illustrations and images included in CareNotes® are the copyrighted property of A [employer] M [employer]  or [employer]

## 2019-01-15 NOTE — PROGRESS NOTES
Consult- General Surgery   Demetrio Zamora 46 y o  male MRN: [id number]  Unit/Bed#:  Encounter: [id number]    Assessment/Plan     Assessment:  Incarcerated umbilical hernia   Diastasis recti  Diabetes  Hypertension  Morbid obesity  Plan:  I advised the patient to undergo laparoscopic umbilical hernia repair with mesh  I discussed the operative procedure, risks, benefits and alternatives with the patient, he understood and agreed to proceed  He would like to postpone surgery until April of this year  He will contact our office at that time  History of Present Illness     HPI:  Demetrio Zamora is a 46 y o  male who presents to my office because of pain from the epigastric area while out work  The patient works as a puppet, squatting most of the time, 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 who started complaining of epigastric pain several months ago, getting progressively worse  He has noted bulge from the epigastric area for the same amount of time, he attributes the pain to that  He denies having any nausea, vomiting, diarrhea, constipation or any other constitutional symptoms  Review of Systems   Constitutional: Negative for chills and fever  HENT: Negative for nosebleeds and sore throat  Eyes: Negative for pain and discharge  Respiratory: Negative for cough and shortness of breath  Cardiovascular: Negative for chest pain and palpitations  Gastrointestinal:        As per history of present illness  Endocrine: Negative for cold intolerance and heat intolerance  Genitourinary: Negative for dysuria and hematuria  Neurological: Negative for seizures and headaches  Hematological: Negative for adenopathy  Does not bruise/bleed easily  Psychiatric/Behavioral: Negative for confusion  The patient is not nervous/anxious          Historical Information   Past Medical History:   Diagnosis Date    Acute renal failure ([place] [address] )     [id number] resolved    Diabetes mellitus ([facility] )     Enteritis 8/23/2016    [facility]roparesis [facility] to DM ([facility] ) 8/23/2016    HLD (hyperlipidemia) 8/23/2016    HTN (hypertension), benign 8/23/2016    Hyperlipidemia     Hypertension     Intractable vomiting with nausea 8/23/2016    Regional enteritis of small bowel ([facility] ) 8/23/2016     Past Surgical History:   Procedure Laterality Date    ESOPHAGOGASTRODUODENOSCOPY N/A 8/24/2016    Procedure: ESOPHAGOGASTRODUODENOSCOPY (EGD); Surgeon: Uyen Dang MD;  [facility]; Service:     UMBILICAL HERNIA REPAIR       Social History   History   Alcohol Use    Yes     Comment: Rarely     History   Drug Use No     History   Smoking Status    Never Smoker   Smokeless Tobacco    Never Used     Family History: non-contributory    Meds/Allergies   all medications and allergies reviewed     Current Outpatient Prescriptions:     amLODIPine (NORVASC) 5 mg tablet, TAKE 1 TABLET BY MOUTH TWICE A DAY, Disp: 180 tablet, Rfl: 1    aspirin 81 MG tablet, Take 81 mg by mouth daily  , Disp: , Rfl:     atorvastatin (LIPITOR) 20 mg tablet, Take 40 mg by mouth daily  , Disp: , Rfl:     atorvastatin (LIPITOR) 40 mg tablet, TAKE 1 TABLET BY MOUTH EVERY DAY, Disp: 90 tablet, Rfl: 2    Avanafil (STENDRA) 100 MG TABS, Take 1 tablet (100 mg total) by mouth as needed (intercourse) for up to 6 doses (Patient not taking: Reported on 10/29/2018 ), Disp: 6 tablet, Rfl: 11    FLUZONE QUADRIVALENT 0 5 ML TIM, ADM 0 5ML IM UTD, Disp: , Rfl: 0    glyBURIDE-metFORMIN (GLUCOVANCE) 5-500 MG per tablet, Take 2 tablets by mouth 2 (two) times a day with meals, Disp: 360 tablet, Rfl: 3    lisinopril (ZESTRIL) 40 mg tablet, TAKE 1 TABLET DAILY  , Disp: 90 tablet, Rfl: 1    loratadine-pseudoephedrine (CLARITIN-D 24-HOUR)  mg per 24 hr tablet, Take 1 tablet by mouth daily, Disp: 30 tablet, Rfl: 3    metoclopramide (REGLAN) 10 mg tablet, Take 10 mg by mouth, Disp: , Rfl:     Needle, Disp, (HYPODERMIC NEEDLE) 23G X 1-1/2" MISC, by Does not apply route once a week, Disp: 10 each, Rfl: 6    pantoprazole (PROTONIX) 20 mg tablet, Take 1 tablet (20 mg total) by mouth daily (Patient taking differently: Take 40 mg by mouth 2 (two) times a day  ), Disp: 90 tablet, Rfl: 1    sildenafil (REVATIO) 20 mg tablet, TAKE ONE TO FIVE tablets needed directed by physician, Disp: , Rfl: 0    sitaGLIPtin (JANUVIA) 100 mg tablet, Take 1 tablet (100 mg total) by mouth daily, Disp: 90 tablet, Rfl: 1    testosterone cypionate (DEPO-TESTOSTERONE) 200 mg/mL SOLN, Inject 1 mL (200 mg total) into a muscle once a week, Disp: 1 vial, Rfl: 2  Allergies   Allergen Reactions    Shellfish-Derived Products Anaphylaxis    Erythromycin GI Intolerance       Objective     Current Vitals:   Blood Pressure: 140/90 (01/15/19 1312)  Pulse: 100 (01/15/19 1312)  Temperature: 98 1 °F (36 7 °C) (01/15/19 1312)  Temp Source: Tympanic (01/15/19 1312)  Respirations: 19 (01/15/19 1312)  Height: 5' 9" (175 3 cm) (01/15/19 1312)  Weight - Scale: 131 kg (288 lb) (01/15/19 1312)      Invasive Devices          No matching active lines, drains, or airways          Physical Exam   Constitutional: He is oriented to person, place, and time  He appears well-developed and well-nourished  No distress  Morbidly obese  HENT:   Head: Normocephalic  Mouth/Throat: No oropharyngeal exudate  Eyes: Pupils are equal, round, and reactive to light  No scleral icterus  Neck: Normal range of motion  Neck supple  Cardiovascular: Normal rate and regular rhythm  No murmur heard  Pulmonary/Chest: Effort normal and breath sounds normal  No respiratory distress  Abdominal:   Abdomen is obese, soft, nondistended and nontender  There is a partially reducible umbilical hernia  There is also diastasis recti when doing a sit-up  There is no obvious visceromegaly or mass palpable  Musculoskeletal: He exhibits no edema or tenderness  Lymphadenopathy:     He has no cervical adenopathy     Neurological: He is alert and oriented to person, place, and time  No cranial nerve deficit  Skin: No rash noted  No erythema  Psychiatric: He has a normal mood and affect   His behavior is normal

## 2019-01-17 ENCOUNTER — TELEPHONE (OUTPATIENT)
Dept: INTERNAL MEDICINE CLINIC | Facility: CLINIC | Age: 54
End: 2019-01-17

## 2019-02-04 ENCOUNTER — TELEPHONE (OUTPATIENT)
Dept: UROLOGY | Facility: MEDICAL CENTER | Age: 54
End: 2019-02-04

## 2019-02-04 DIAGNOSIS — N52.9 ERECTILE DYSFUNCTION, UNSPECIFIED ERECTILE DYSFUNCTION TYPE: Primary | ICD-10-CM

## 2019-02-04 DIAGNOSIS — E29.1 TESTICULAR HYPOGONADISM: Primary | ICD-10-CM

## 2019-02-04 DIAGNOSIS — N52.9 ERECTILE DYSFUNCTION, UNSPECIFIED ERECTILE DYSFUNCTION TYPE: ICD-10-CM

## 2019-02-04 RX ORDER — SILDENAFIL CITRATE 20 MG/1
TABLET ORAL
Refills: 0 | Status: CANCELLED | OUTPATIENT
Start: 2019-02-04

## 2019-02-04 RX ORDER — SILDENAFIL CITRATE 20 MG/1
TABLET ORAL
Qty: 60 TABLET | Refills: 3 | Status: SHIPPED | OUTPATIENT
Start: 2019-02-04 | End: 2020-02-03

## 2019-02-04 NOTE — TELEPHONE ENCOUNTER
Patient needs a refill of Sildenafil 20 mg  Patient said he was given this prescription back in May in the office and was told to take it to a compound pharmacy  [place] the country today and [place]  Needs refill ASAP  Please call him  He can either  the rx to take it to the compound pharmacy, or it can be called in to [facility]   Please call him when this is done

## 2019-02-05 NOTE — TELEPHONE ENCOUNTER
Yes, patient will need a repeat testosterone and CBC performed prior to his follow-up appointment given his ongoing testosterone use

## 2019-02-05 NOTE — TELEPHONE ENCOUNTER
Called and spoke to patient and advised he will need to repeat his testosterone levels and CBC levels prior to follow up    Orders entered and mailed to patient per his request

## 2019-02-05 NOTE — TELEPHONE ENCOUNTER
Called and informed patient refill for Sildenafil was sent electronically to [address] [place] [address] yesterday  Patient states he received a letter from our office to reschedule his appt on 4/9/19 due to provider schedule change  Rescheduled patient for follow up on 4/10/19 with ANJELICA Hairston at 9:30 am, [place]  Patient would like to know if he needs to repeat any blood work, he had done his blood work 1/10/19,  Testosterone and CBC  Patient will check for refills of his Testosterone and let us know if he needs another refill to bridge until his follow up appt  Please advise if patient needs to repeat any blood work and if needed can he get a refill of Testosterone  Please route to [facility]

## 2019-02-12 DIAGNOSIS — E78.5 HYPERLIPIDEMIA, UNSPECIFIED HYPERLIPIDEMIA TYPE: ICD-10-CM

## 2019-02-13 RX ORDER — ATORVASTATIN CALCIUM 40 MG/1
TABLET, FILM COATED ORAL
Qty: 90 TABLET | Refills: 2 | Status: ON HOLD | OUTPATIENT
Start: 2019-02-13 | End: 2019-04-26 | Stop reason: SDUPTHER

## 2019-03-22 DIAGNOSIS — K21.9 GASTROESOPHAGEAL REFLUX DISEASE WITHOUT ESOPHAGITIS: Primary | ICD-10-CM

## 2019-03-22 RX ORDER — PANTOPRAZOLE SODIUM 40 MG/1
TABLET, DELAYED RELEASE ORAL
Qty: 180 TABLET | Refills: 2 | Status: SHIPPED | OUTPATIENT
Start: 2019-03-22 | End: 2019-09-30 | Stop reason: SDUPTHER

## 2019-03-26 ENCOUNTER — APPOINTMENT (OUTPATIENT)
Dept: LAB | Facility: HOSPITAL | Age: 54
End: 2019-03-26
Attending: SURGERY
Payer: COMMERCIAL

## 2019-03-26 ENCOUNTER — OFFICE VISIT (OUTPATIENT)
Dept: LAB | Facility: HOSPITAL | Age: 54
End: 2019-03-26
Attending: SURGERY
Payer: COMMERCIAL

## 2019-03-26 ENCOUNTER — OFFICE VISIT (OUTPATIENT)
Dept: SURGERY | Facility: CLINIC | Age: 54
End: 2019-03-26
Payer: OTHER MISCELLANEOUS

## 2019-03-26 VITALS
BODY MASS INDEX: 42.81 KG/M2 | WEIGHT: 289.02 LBS | DIASTOLIC BLOOD PRESSURE: 74 MMHG | HEIGHT: 69 IN | RESPIRATION RATE: 16 BRPM | HEART RATE: 104 BPM | SYSTOLIC BLOOD PRESSURE: 126 MMHG | TEMPERATURE: 98.6 F

## 2019-03-26 DIAGNOSIS — K42.0 INCARCERATED UMBILICAL HERNIA: Primary | ICD-10-CM

## 2019-03-26 DIAGNOSIS — K42.0 INCARCERATED UMBILICAL HERNIA: ICD-10-CM

## 2019-03-26 LAB
ANION GAP SERPL CALCULATED.3IONS-SCNC: 6 MMOL/L (ref 4–13)
ATRIAL RATE: 93 BPM
BASOPHILS # BLD AUTO: 0.08 THOUSANDS/ΜL (ref 0–0.1)
BASOPHILS NFR BLD AUTO: 1 % (ref 0–1)
BUN SERPL-MCNC: 24 MG/DL (ref 5–25)
CALCIUM SERPL-MCNC: 9.3 MG/DL (ref 8.3–10.1)
CHLORIDE SERPL-SCNC: 101 MMOL/L (ref 100–108)
CO2 SERPL-SCNC: 28 MMOL/L (ref 21–32)
CREAT SERPL-MCNC: 1.57 MG/DL (ref 0.6–1.3)
EOSINOPHIL # BLD AUTO: 0.01 THOUSAND/ΜL (ref 0–0.61)
EOSINOPHIL NFR BLD AUTO: 0 % (ref 0–6)
ERYTHROCYTE [DISTWIDTH] IN BLOOD BY AUTOMATED COUNT: 13.3 % (ref 11.6–15.1)
GFR SERPL CREATININE-BSD FRML MDRD: 49 ML/MIN/1.73SQ M
GLUCOSE SERPL-MCNC: 338 MG/DL (ref 65–140)
HCT VFR BLD AUTO: 45.9 % (ref 36.5–49.3)
HGB BLD-MCNC: 15.1 G/DL (ref 12–17)
IMM GRANULOCYTES # BLD AUTO: 0.07 THOUSAND/UL (ref 0–0.2)
IMM GRANULOCYTES NFR BLD AUTO: 1 % (ref 0–2)
LYMPHOCYTES # BLD AUTO: 1.73 THOUSANDS/ΜL (ref 0.6–4.47)
LYMPHOCYTES NFR BLD AUTO: 15 % (ref 14–44)
MCH RBC QN AUTO: 28.5 PG (ref 26.8–34.3)
MCHC RBC AUTO-ENTMCNC: 32.9 G/DL (ref 31.4–37.4)
MCV RBC AUTO: 87 FL (ref 82–98)
MONOCYTES # BLD AUTO: 1.1 THOUSAND/ΜL (ref 0.17–1.22)
MONOCYTES NFR BLD AUTO: 10 % (ref 4–12)
NEUTROPHILS # BLD AUTO: 8.29 THOUSANDS/ΜL (ref 1.85–7.62)
NEUTS SEG NFR BLD AUTO: 73 % (ref 43–75)
NRBC BLD AUTO-RTO: 0 /100 WBCS
P AXIS: 51 DEGREES
PLATELET # BLD AUTO: 274 THOUSANDS/UL (ref 149–390)
PMV BLD AUTO: 11 FL (ref 8.9–12.7)
POTASSIUM SERPL-SCNC: 5.2 MMOL/L (ref 3.5–5.3)
PR INTERVAL: 174 MS
QRS AXIS: -42 DEGREES
QRSD INTERVAL: 146 MS
QT INTERVAL: 388 MS
QTC INTERVAL: 482 MS
RBC # BLD AUTO: 5.3 MILLION/UL (ref 3.88–5.62)
SODIUM SERPL-SCNC: 135 MMOL/L (ref 136–145)
T WAVE AXIS: -5 DEGREES
VENTRICULAR RATE: 93 BPM
WBC # BLD AUTO: 11.28 THOUSAND/UL (ref 4.31–10.16)

## 2019-03-26 PROCEDURE — 99213 OFFICE O/P EST LOW 20 MIN: CPT | Performed by: SURGERY

## 2019-03-26 PROCEDURE — 36415 COLL VENOUS BLD VENIPUNCTURE: CPT

## 2019-03-26 PROCEDURE — 93010 ELECTROCARDIOGRAM REPORT: CPT | Performed by: INTERNAL MEDICINE

## 2019-03-26 PROCEDURE — 80048 BASIC METABOLIC PNL TOTAL CA: CPT

## 2019-03-26 PROCEDURE — 93005 ELECTROCARDIOGRAM TRACING: CPT

## 2019-03-26 PROCEDURE — 85025 COMPLETE CBC W/AUTO DIFF WBC: CPT

## 2019-03-26 RX ORDER — SODIUM CHLORIDE, SODIUM LACTATE, POTASSIUM CHLORIDE, CALCIUM CHLORIDE 600; 310; 30; 20 MG/100ML; MG/100ML; MG/100ML; MG/100ML
125 INJECTION, SOLUTION INTRAVENOUS
Status: CANCELLED | OUTPATIENT
Start: 2019-04-27 | End: 2019-04-28

## 2019-03-26 RX ORDER — ENOXAPARIN SODIUM 300 MG/3ML
40 INJECTION INTRAVENOUS; SUBCUTANEOUS
Status: CANCELLED | OUTPATIENT
Start: 2019-03-27 | End: 2019-03-28

## 2019-03-26 RX ORDER — CEFAZOLIN SODIUM 2 G/50ML
2000 SOLUTION INTRAVENOUS
Status: CANCELLED | OUTPATIENT
Start: 2019-04-27 | End: 2019-04-28

## 2019-03-26 NOTE — PROGRESS NOTES
Follow Up - General Surgery   Annamarie Iniguez 46 y o  male MRN: [id number]  Unit/Bed#:  Encounter: [id number]    Assessment/Plan     Assessment:  Incarcerated umbilical hernia  Morbid obesity  Hypertension  Diabetes  Plan:  In light of the symptomatology I advised the patient to undergo laparoscopic umbilical hernia repair with mesh in the near future  I discussed the operative procedure, risks, benefits and alternatives with the patient, he understood and agreed to proceed  History of Present Illness     HPI:  Annamarie Iniguez is a 46 y o  male who presents to my office for follow-up  The patient was seen in my office in January of this year to for evaluation of epigastric hernia  That time he was found to have diastasis recti, he also had incarcerated umbilical hernia  The patient still complains of pain from the umbilicus, on and off with heavy lifting  He denies having any nausea, vomiting, diarrhea, constipation or any other constitutional symptoms  Review of Systems   All other systems reviewed and are negative  Historical Information   Past Medical History:   Diagnosis Date    Acute renal failure ([facility] )     [id number] resolved    Diabetes mellitus ([facility] )     Enteritis 8/23/2016    [facility]rop[facility] to [facility] ) 8/23/2016    HLD (hyperlipidemia) 8/23/2016    HTN (hypertension), benign 8/23/2016    Hyperlipidemia     Hypertension     Intractable vomiting with nausea 8/23/2016    Regional enteritis of small bowel ([facility] ) 8/23/2016     Past Surgical History:   Procedure Laterality Date    ESOPHAGOGASTRODUODENOSCOPY N/A 8/24/2016    Procedure: ESOPHAGOGASTRODUODENOSCOPY (EGD); Surgeon: Reshma Horton MD;  [facility];   Service:     UMBILICAL HERNIA REPAIR       Social History   Social History     Substance and Sexual Activity   Alcohol Use Yes    Comment: Rarely     Social History     Substance and Sexual Activity   Drug Use No     Social History     Tobacco Use   Smoking Status Never Smoker   Smokeless Tobacco Never Used     Family History: non-contributory    Meds/Allergies   all medications and allergies reviewed     Current Outpatient Medications:     amLODIPine (NORVASC) 5 mg tablet, TAKE 1 TABLET BY MOUTH TWICE A DAY, Disp: 180 tablet, Rfl: 1    aspirin 81 MG tablet, Take 81 mg by mouth daily  , Disp: , Rfl:     atorvastatin (LIPITOR) 20 mg tablet, Take 40 mg by mouth daily  , Disp: , Rfl:     atorvastatin (LIPITOR) 40 mg tablet, TAKE 1 TABLET BY MOUTH EVERY DAY, Disp: 90 tablet, Rfl: 2    Avanafil (STENDRA) 100 MG TABS, Take 1 tablet (100 mg total) by mouth as needed (intercourse) for up to 6 doses (Patient not taking: Reported on 10/29/2018 ), Disp: 6 tablet, Rfl: 11    FLUZONE QUADRIVALENT 0 5 ML TIM, ADM 0 5ML IM UTD, Disp: , Rfl: 0    glyBURIDE-metFORMIN (GLUCOVANCE) 5-500 MG per tablet, Take 2 tablets by mouth 2 (two) times a day with meals, Disp: 360 tablet, Rfl: 3    lisinopril (ZESTRIL) 40 mg tablet, TAKE 1 TABLET DAILY  , Disp: 90 tablet, Rfl: 1    loratadine-pseudoephedrine (CLARITIN-D 24-HOUR)  mg per 24 hr tablet, Take 1 tablet by mouth daily, Disp: 30 tablet, Rfl: 3    metoclopramide (REGLAN) 10 mg tablet, Take 10 mg by mouth, Disp: , Rfl:     Needle, Disp, (HYPODERMIC NEEDLE) 23G X 1-1/2" MISC, by Does not apply route once a week, Disp: 10 each, Rfl: 6    pantoprazole (PROTONIX) 20 mg tablet, Take 1 tablet (20 mg total) by mouth daily (Patient taking differently: Take 40 mg by mouth 2 (two) times a day  ), Disp: 90 tablet, Rfl: 1    pantoprazole (PROTONIX) 40 mg tablet, TAKE 1 TABLET BY MOUTH TWICE A DAY, Disp: 180 tablet, Rfl: 2    sildenafil (REVATIO) 20 mg tablet, Take 3-5 tablets PO PRN erectile dysfunction, Disp: 60 tablet, Rfl: 3    sitaGLIPtin (JANUVIA) 100 mg tablet, Take 1 tablet (100 mg total) by mouth daily, Disp: 90 tablet, Rfl: 1    testosterone cypionate (DEPO-TESTOSTERONE) 200 mg/mL SOLN, Inject 1 mL (200 mg total) into a muscle once a week, Disp: 1 vial, Rfl: 2  Allergies   Allergen Reactions    Shellfish-Derived Products Anaphylaxis    Erythromycin GI Intolerance       Objective     Current Vitals:   Blood Pressure: 126/74 (03/26/19 1343)  Pulse: 104 (03/26/19 1343)  Temperature: 98 6 °F (37 °C) (03/26/19 1343)  Temp Source: Tympanic (03/26/19 1343)  Respirations: 16 (03/26/19 1343)  Height: 5' 9" (175 3 cm) (03/26/19 1343)  Weight - Scale: 131 kg (289 lb 0 3 oz) (03/26/19 1343)      Invasive Devices          None          Physical Exam   Constitutional: He is oriented to person, place, and time  He appears well-developed and well-nourished  No distress  Morbidly obese  HENT:   Head: Normocephalic  Mouth/Throat: No oropharyngeal exudate  Eyes: Pupils are equal, round, and reactive to light  No scleral icterus  Neck: Normal range of motion  Cardiovascular: Normal rate and regular rhythm  No murmur heard  Pulmonary/Chest: Effort normal and breath sounds normal  No respiratory distress  Abdominal:   Abdomen is obese, soft, nondistended and nontender  There is incarcerated umbilical hernia, somewhat tender to palpation without skin color changes  There is no obvious visceromegaly or mass palpable  Musculoskeletal: He exhibits no edema or deformity  Lymphadenopathy:     He has no cervical adenopathy  Neurological: He is alert and oriented to person, place, and time  No cranial nerve deficit  Skin: No erythema  No pallor  Psychiatric: He has a normal mood and affect   His behavior is normal

## 2019-03-26 NOTE — PATIENT INSTRUCTIONS
Ventral Hernia Repair   AMBULATORY CARE:   A ventral hernia repair  is surgery to fix a ventral hernia  A ventral hernia may be repaired if the hernia is preventing blood flow to organs or blocking the intestines  It may be done laparoscopically or open  Laparoscopically means that your healthcare provider will use several small incisions to fix the hernia  In an open repair, your healthcare provider will make one incision to fix your hernia  How to prepare for a ventral hernia repair:   · Your healthcare provider will talk to you about how to prepare for surgery  He may tell you not to eat or drink anything after midnight on the day of your surgery  He will tell you what medicines to take or not take on the day of your surgery  You may need to stop taking blood thinners or aspirin several days to weeks before your surgery  You may need blood work, a CT scan, or an ultrasound before surgery  These tests take pictures of your hernia and help your healthcare provider plan your surgery  You may be given contrast liquid before the tests  Tell the healthcare provider if you have ever had an allergic reaction to contrast liquid  · You may need a bowel prep before surgery  A bowel prep is when you take medicine to help clean out the colon  This decreases your risk of infection if a hole is made in your intestines during surgery  You may be given an antibiotic through your IV to help prevent a bacterial infection  Arrange for someone to drive you home and stay with you after surgery  What will happen during a ventral hernia repair:   · You will be given general anesthesia to keep you asleep and free from pain during surgery  To fix the hernia laparoscopically, your healthcare provider will make a small incision above or to the side of your hernia, and insert a laparoscope  A laparoscope is a long metal tube with a light and camera on the end   He will insert other instruments by making 2 to 4 smaller incisions at different places on your abdomen  In an open hernia repair, your healthcare provider will make one large incision over your hernia  · In both types of hernia repair, tools are used to remove the sac that contains your organs or abdominal tissue  Next, your healthcare provider will move your organs or tissue back into its correct place  Stitches or mesh may be used to close or cover the opening in your abdominal wall  This may prevent your organs and tissues from bulging through it again  Your healthcare provider may close the incisions in your skin with stitches, medical glue, or strips of medical tape  What will happen after a ventral hernia repair:  Healthcare providers will monitor you until you are awake  You may be able to go home when your pain is controlled, you can drink liquids, and you can urinate  You may instead need to spend a night in the hospital  Yfn Nichole will not be able to drive or lift anything heavy for one to two weeks  Risks of a ventral hernia repair:  Your organs, blood vessels, or nerves may get injured during the surgery  You may bleed more than expected or get an infection  A pocket of fluid may form under your skin  This may heal on its own or you may need surgery to remove it  Problems, such as a hole in your intestines, may happen during your laparoscopic repair that may lead to a laparotomy (open surgery)  Even after you have this surgery, there is a chance that you could have another hernia  You may get a blood clot in your leg or arm  This may become life-threatening  Call 911 for any of the following:   · You feel lightheaded, short of breath, and have chest pain  · You cough up blood  · You have trouble breathing  Seek care immediately if:   · Your arm or leg feels warm, tender, and painful  It may look swollen and red  · Blood soaks through your bandage      · Your abdomen feels hard and looks bigger than usual      · Your bowel movements are black, bloody, or tarry-looking  Contact your healthcare provider if:   · You have a fever above 101° F      · You develop a skin rash, hives, or itching  · Your incision is swollen, red, or draining pus or fluid  · You have nausea, or you are vomiting  · You cannot have a bowel movement  · Your pain does not get better after taking pain medicine  · You have questions or concerns about your condition or care  Medicines: You may need any of the following:  · Prescription pain medicine  may be given  Ask your healthcare provider how to take this medicine safely  · NSAIDs , such as ibuprofen, help decrease swelling, pain, and fever  This medicine is available with or without a doctor's order  NSAIDs can cause stomach bleeding or kidney problems in certain people  If you take blood thinner medicine, always ask your healthcare provider if NSAIDs are safe for you  Always read the medicine label and follow directions  · Take your medicine as directed  Contact your healthcare provider if you think your medicine is not helping or if you have side effects  Tell him or her if you are allergic to any medicine  Keep a list of the medicines, vitamins, and herbs you take  Include the amounts, and when and why you take them  Bring the list or the pill bottles to follow-up visits  Carry your medicine list with you in case of an emergency  Care for your wound as directed:  Carefully wash around your wound  It is okay to let soap and water run over your wound  Do not  scrub your wound  Dry the area and put on new, clean bandages as directed  Change your bandages when they get wet or dirty  If you have strips of medical tape over your incision, allow them to fall off on their own  Do not get in a bathtub, swimming pool, or hot tub until your healthcare provider says it is okay  Self-care:   · Eat a variety of healthy foods    Healthy foods include fruits, vegetables, whole-grain breads, low-fat dairy products, beans, lean meats, and fish  Healthy foods may help you heal faster  Ask if you need to be on a special diet  · Drink liquids as directed  Liquids may prevent constipation and straining during a bowel movement  This will help prevent pressure on your incision, and another hernia from happening  Ask how much liquid to drink each day and which liquids are best for you  · Apply ice  on your incision for 15 to 20 minutes every hour or as directed  Use an ice pack, or put crushed ice in a plastic bag  Cover it with a towel  Ice helps prevent tissue damage and decreases swelling and pain  · Take deep breaths and cough  10 times each hour  This will decrease your risk for a lung infection  Take a deep breath and hold it for as long as you can  Let the air out and then cough strongly  Deep breaths help open your airway  You may be given an incentive spirometer to help you take deep breaths  Put the plastic piece in your mouth and take a slow, deep breath, then let the air out and cough  Repeat these steps 10 times every hour  Press a pillow lightly against your incision when you cough  This may decrease pain or discomfort  · Wear a binder as directed  Your healthcare provider may tell you to wear a binder over your incision  A binder is an elastic bandage that wraps around your abdomen and over your incision  It fits snug and helps decrease pain when you move or cough  Driving:  Do not drive for at least one week after surgery  Do not drive if you are taking prescription pain medication  Ask your healthcare provider when it is safe for you to drive  Activity:  Do not lift anything heavy until your healthcare provider says it is okay  This may put too much pressure on your incision and cause it to come apart  It may also increase your risk for another hernia  Do not play sports for 2 to 3 weeks  Ask your healthcare provider when you can return to work and your normal activities     Follow up with your healthcare provider as directed:  Write down your questions so you remember to ask them during your visits  © 2017 [facility] Information is for End User's use only and may not be sold, redistributed or otherwise used for commercial purposes  All illustrations and images included in CareNotes® are the copyrighted property of [employer]  or [employer]  The above information is an  only  It is not intended as medical advice for individual conditions or treatments  Talk to your doctor, nurse or pharmacist before following any medical regimen to see if it is safe and effective for you  Umbilical Hernia   WHAT YOU NEED TO KNOW:   What is an umbilical hernia? An umbilical hernia is a bulge through the abdominal muscles in the area of the navel (belly button)  The hernia may contain tissue from the abdomen, part of an organ (such as the intestine), or fluid  What increases my risk for an umbilical hernia? Umbilical hernias usually happen because of a hole or a weak area in the muscles of the abdominal wall  This can happen at any age  Umbilical hernias happen more often in women than in men  You may be more likely to have a hernia if other family members have them  You may also have an increased risk if you have any of the following:  · Overweight     · Ascites, which is fluid in the abdomen     · A large growth in your abdomen     · Pregnancy now or at any time in the past     · A very long labor when delivering your baby  What are the signs and symptoms of an umbilical hernia? · The most common sign is a bulge or swelling in the area of the navel  You may be able to see the lump, or you may feel it when you gently press on your navel  · The bulge may get bigger when you bend, cough, or strain to have a bowel movement  The bulge may get smaller and hurt less when you lie down  · You may have pain or burning in your abdomen   The pain may get worse when you cough, sneeze, lift, or stand for a long time  · The skin over the bulge may be swollen and red, gray, or blue  How is an umbilical hernia diagnosed? Your healthcare provider will usually find the hernia during an exam  Your healthcare provider may check to see if the hernia can be reduced (gently pushed back into the abdomen)  You may need tests, such as x-rays of the abdomen or an ultrasound  These tests will help healthcare providers decide how to treat your hernia  How is an umbilical hernia treated? · Surgery:  Most adults with umbilical hernias will need surgery to fix their hernias  Always tell your healthcare provider if you have new or worse pain in the area of your hernia  You may need emergency surgery if a loop of intestine becomes trapped in the hernia  · Ibuprofen or acetaminophen:  These medicines decrease pain  They are available without a doctor's order  Ask your healthcare provider which medicine is right for you  Ask how much to take and how often to take it  Follow directions  These medicines can cause stomach bleeding if not taken correctly  Ibuprofen can cause kidney damage  Do not take ibuprofen if you have kidney disease, an ulcer, or allergies to aspirin  Acetaminophen can cause liver damage  Do not drink alcohol if you take acetaminophen  What are the risks of an umbilical hernia? If your hernia is not treated, the following serious problems may happen:  · Incarcerated hernia: This happens when your healthcare provider cannot push your hernia back into your abdomen  The tissue becomes stuck or trapped, which can cause serious problems  Umbilical hernias have a high risk of becoming incarcerated  If this happens, your intestines may become blocked  You may have very bad pain in your abdomen  You may also have nausea or vomiting  · Strangulated hernia:  A loop of intestine in the hernia may become pinched or strangulated   This means that the blood supply to that area of intestine is decreased or cut off  If this happens, you may feel very bad pain in your abdomen  Other signs include nausea, vomiting, or a fever  You may have constipation or blood in your bowel movements  If your intestine becomes blocked, you may not be able to pass gas or have a bowel movement  If the hernia is not treated right away, that part of your intestine may die  This is called gangrene, and it can be life-threatening  When should I contact my healthcare provider? · You have nausea or vomiting  · You cannot gently push your hernia back into your abdomen  · You are constipated or have blood in your bowel movements  · Your hernia is getting bigger  · You have questions or concerns about your condition or care  When should I seek immediate care or call 911? · You have a fever  · Your hernia is stuck outside the abdomen and is painful, swollen, or feels hard  · You completely stop having bowel movements and stop passing gas  · Your abdominal pain is bad or getting worse  CARE AGREEMENT:   You have the right to help plan your care  Learn about your health condition and how it may be treated  Discuss treatment options with your caregivers to decide what care you want to receive  You always have the right to refuse treatment  The above information is an  only  It is not intended as medical advice for individual conditions or treatments  Talk to your doctor, nurse or pharmacist before following any medical regimen to see if it is safe and effective for you  © 2017 [address] Information is for End User's use only and may not be sold, redistributed or otherwise used for commercial purposes  All illustrations and images included in CareNotes® are the copyrighted property of [employer]  or [employer]

## 2019-03-26 NOTE — PROGRESS NOTES
Pt states he thinks he may have an inguinal hernia as well as the umbilical hernia  He notices some bulging and is having some pain on his right inguinal area  Bowels vary from normal to strained, eating fairly, no nausea or vomiting  No fevers

## 2019-03-27 ENCOUNTER — TREATMENT (OUTPATIENT)
Dept: OTHER | Facility: HOSPITAL | Age: 54
End: 2019-03-27

## 2019-03-27 ENCOUNTER — TELEPHONE (OUTPATIENT)
Dept: SURGERY | Facility: CLINIC | Age: 54
End: 2019-03-27

## 2019-03-27 ENCOUNTER — ANESTHESIA EVENT (OUTPATIENT)
Dept: PERIOP | Facility: HOSPITAL | Age: 54
End: 2019-03-27

## 2019-03-27 DIAGNOSIS — E11.9 TYPE 2 DIABETES MELLITUS WITHOUT COMPLICATION, WITH LONG-TERM CURRENT USE OF INSULIN (HCC): Primary | ICD-10-CM

## 2019-03-27 DIAGNOSIS — Z79.4 TYPE 2 DIABETES MELLITUS WITHOUT COMPLICATION, WITH LONG-TERM CURRENT USE OF INSULIN (HCC): Primary | ICD-10-CM

## 2019-03-27 NOTE — TELEPHONE ENCOUNTER
Pt returned my call, pt is flying out to [facility] and will get me the fax number of a lab once he is there  Then I will fax over the order

## 2019-03-27 NOTE — TELEPHONE ENCOUNTER
Called left a detailed message asking the pt to go to the lab to get a ac1 drawn so that the level can be determined, due to the fact that his glucose came back elevated  I asked the pt to call the office to confirm this message

## 2019-03-29 LAB — HBA1C MFR BLD: 10.2 % (ref 4.8–5.6)

## 2019-04-01 ENCOUNTER — TELEPHONE (OUTPATIENT)
Dept: SURGERY | Facility: CLINIC | Age: 54
End: 2019-04-01

## 2019-04-02 ENCOUNTER — OFFICE VISIT (OUTPATIENT)
Dept: INTERNAL MEDICINE CLINIC | Facility: CLINIC | Age: 54
End: 2019-04-02
Payer: OTHER MISCELLANEOUS

## 2019-04-02 VITALS
OXYGEN SATURATION: 95 % | SYSTOLIC BLOOD PRESSURE: 136 MMHG | HEIGHT: 69 IN | DIASTOLIC BLOOD PRESSURE: 90 MMHG | TEMPERATURE: 97.4 F | HEART RATE: 83 BPM | WEIGHT: 289 LBS | BODY MASS INDEX: 42.8 KG/M2

## 2019-04-02 DIAGNOSIS — I10 BENIGN ESSENTIAL HYPERTENSION: Chronic | ICD-10-CM

## 2019-04-02 DIAGNOSIS — E29.1 TESTICULAR HYPOGONADISM: ICD-10-CM

## 2019-04-02 DIAGNOSIS — E66.01 MORBID OBESITY (HCC): ICD-10-CM

## 2019-04-02 DIAGNOSIS — E11.9 TYPE 2 DIABETES MELLITUS WITHOUT COMPLICATION, WITHOUT LONG-TERM CURRENT USE OF INSULIN (HCC): Primary | ICD-10-CM

## 2019-04-02 LAB
LEFT EYE DIABETIC RETINOPATHY: NORMAL
LEFT EYE IMAGE QUALITY: NORMAL
LEFT EYE MACULAR EDEMA: NORMAL
LEFT EYE OTHER RETINOPATHY: NORMAL
RIGHT EYE DIABETIC RETINOPATHY: NORMAL
RIGHT EYE IMAGE QUALITY: NORMAL
RIGHT EYE MACULAR EDEMA: NORMAL
RIGHT EYE OTHER RETINOPATHY: NORMAL
SEVERITY (EYE EXAM): NORMAL

## 2019-04-02 PROCEDURE — 99214 OFFICE O/P EST MOD 30 MIN: CPT | Performed by: PHYSICIAN ASSISTANT

## 2019-04-02 PROCEDURE — 92250 FUNDUS PHOTOGRAPHY W/I&R: CPT | Performed by: PHYSICIAN ASSISTANT

## 2019-04-02 RX ORDER — PEN NEEDLE, DIABETIC 30 GX3/16"
NEEDLE, DISPOSABLE MISCELLANEOUS 4 TIMES DAILY
Qty: 100 EACH | Refills: 3 | Status: SHIPPED | OUTPATIENT
Start: 2019-04-02 | End: 2020-05-13 | Stop reason: SDUPTHER

## 2019-04-03 ENCOUNTER — ANESTHESIA (OUTPATIENT)
Dept: PERIOP | Facility: HOSPITAL | Age: 54
End: 2019-04-03

## 2019-04-04 ENCOUNTER — TELEPHONE (OUTPATIENT)
Dept: SURGERY | Facility: CLINIC | Age: 54
End: 2019-04-04

## 2019-04-05 ENCOUNTER — OFFICE VISIT (OUTPATIENT)
Dept: INTERNAL MEDICINE CLINIC | Facility: CLINIC | Age: 54
End: 2019-04-05
Payer: OTHER MISCELLANEOUS

## 2019-04-05 VITALS
WEIGHT: 285.2 LBS | TEMPERATURE: 97.8 F | HEIGHT: 70 IN | OXYGEN SATURATION: 94 % | HEART RATE: 80 BPM | DIASTOLIC BLOOD PRESSURE: 92 MMHG | SYSTOLIC BLOOD PRESSURE: 142 MMHG | BODY MASS INDEX: 40.83 KG/M2

## 2019-04-05 DIAGNOSIS — I10 BENIGN ESSENTIAL HYPERTENSION: Chronic | ICD-10-CM

## 2019-04-05 DIAGNOSIS — E66.01 MORBID OBESITY (HCC): ICD-10-CM

## 2019-04-05 DIAGNOSIS — E11.9 TYPE 2 DIABETES MELLITUS WITHOUT COMPLICATION, WITHOUT LONG-TERM CURRENT USE OF INSULIN (HCC): Primary | ICD-10-CM

## 2019-04-05 DIAGNOSIS — E78.2 MIXED HYPERLIPIDEMIA: ICD-10-CM

## 2019-04-05 PROCEDURE — 99214 OFFICE O/P EST MOD 30 MIN: CPT | Performed by: PHYSICIAN ASSISTANT

## 2019-04-14 DIAGNOSIS — I10 BENIGN ESSENTIAL HYPERTENSION: Chronic | ICD-10-CM

## 2019-04-15 RX ORDER — AMLODIPINE BESYLATE 5 MG/1
TABLET ORAL
Qty: 180 TABLET | Refills: 0 | Status: SHIPPED | OUTPATIENT
Start: 2019-04-15 | End: 2019-07-21 | Stop reason: SDUPTHER

## 2019-04-19 ENCOUNTER — APPOINTMENT (OUTPATIENT)
Dept: LAB | Facility: CLINIC | Age: 54
End: 2019-04-19
Payer: COMMERCIAL

## 2019-04-19 DIAGNOSIS — E11.9 TYPE 2 DIABETES MELLITUS WITHOUT COMPLICATION, WITH LONG-TERM CURRENT USE OF INSULIN (HCC): ICD-10-CM

## 2019-04-19 DIAGNOSIS — I10 BENIGN ESSENTIAL HYPERTENSION: Primary | ICD-10-CM

## 2019-04-19 DIAGNOSIS — Z79.4 TYPE 2 DIABETES MELLITUS WITHOUT COMPLICATION, WITH LONG-TERM CURRENT USE OF INSULIN (HCC): ICD-10-CM

## 2019-04-19 LAB
EST. AVERAGE GLUCOSE BLD GHB EST-MCNC: 194 MG/DL
HBA1C MFR BLD: 8.4 % (ref 4.2–6.3)

## 2019-04-19 PROCEDURE — 36415 COLL VENOUS BLD VENIPUNCTURE: CPT

## 2019-04-19 PROCEDURE — 83036 HEMOGLOBIN GLYCOSYLATED A1C: CPT

## 2019-04-19 RX ORDER — ATORVASTATIN CALCIUM 20 MG/1
40 TABLET, FILM COATED ORAL DAILY
Qty: 180 TABLET | Refills: 0 | Status: SHIPPED | OUTPATIENT
Start: 2019-04-19 | End: 2019-07-11 | Stop reason: SDUPTHER

## 2019-04-21 DIAGNOSIS — E11.9 TYPE 2 DIABETES MELLITUS WITHOUT COMPLICATION, WITHOUT LONG-TERM CURRENT USE OF INSULIN (HCC): ICD-10-CM

## 2019-04-22 RX ORDER — GLYBURIDE-METFORMIN HYDROCHLORIDE 5; 500 MG/1; MG/1
TABLET ORAL
Qty: 360 TABLET | Refills: 0 | Status: SHIPPED | OUTPATIENT
Start: 2019-04-22 | End: 2019-07-25 | Stop reason: SDUPTHER

## 2019-04-25 ENCOUNTER — TELEPHONE (OUTPATIENT)
Dept: SURGERY | Facility: CLINIC | Age: 54
End: 2019-04-25

## 2019-04-25 ENCOUNTER — ANESTHESIA EVENT (OUTPATIENT)
Dept: PERIOP | Facility: HOSPITAL | Age: 54
End: 2019-04-25
Payer: OTHER MISCELLANEOUS

## 2019-04-26 ENCOUNTER — HOSPITAL ENCOUNTER (OUTPATIENT)
Facility: HOSPITAL | Age: 54
Setting detail: OUTPATIENT SURGERY
Discharge: HOME | End: 2019-04-26
Attending: SURGERY | Admitting: SURGERY
Payer: OTHER MISCELLANEOUS

## 2019-04-26 ENCOUNTER — ANESTHESIA (OUTPATIENT)
Dept: PERIOP | Facility: HOSPITAL | Age: 54
End: 2019-04-26
Payer: OTHER MISCELLANEOUS

## 2019-04-26 VITALS
WEIGHT: 279.1 LBS | HEART RATE: 66 BPM | HEIGHT: 70 IN | RESPIRATION RATE: 12 BRPM | BODY MASS INDEX: 39.96 KG/M2 | TEMPERATURE: 98.4 F | DIASTOLIC BLOOD PRESSURE: 88 MMHG | SYSTOLIC BLOOD PRESSURE: 151 MMHG | OXYGEN SATURATION: 94 %

## 2019-04-26 DIAGNOSIS — K42.0 INCARCERATED UMBILICAL HERNIA: ICD-10-CM

## 2019-04-26 LAB
GLUCOSE SERPL-MCNC: 104 MG/DL (ref 65–140)
GLUCOSE SERPL-MCNC: 156 MG/DL (ref 65–140)

## 2019-04-26 PROCEDURE — C1781 MESH (IMPLANTABLE): HCPCS | Performed by: SURGERY

## 2019-04-26 PROCEDURE — 49653 PR LAP, VENTRAL HERNIA REPAIR,INCARCERATED: CPT | Performed by: CLINICAL NURSE SPECIALIST

## 2019-04-26 PROCEDURE — 82948 REAGENT STRIP/BLOOD GLUCOSE: CPT

## 2019-04-26 PROCEDURE — 49653 PR LAP, VENTRAL HERNIA REPAIR,INCARCERATED: CPT | Performed by: SURGERY

## 2019-04-26 PROCEDURE — NC001 PR NO CHARGE: Performed by: SURGERY

## 2019-04-26 DEVICE — VENTRALIGHT ST MESH
Type: IMPLANTABLE DEVICE | Site: ABDOMEN | Status: FUNCTIONAL
Brand: VENTRALIGHT ST

## 2019-04-26 DEVICE — ETHICON SECURESTRAP ABSORBABLE FIXATION DEVICE
Type: IMPLANTABLE DEVICE | Site: ABDOMEN | Status: FUNCTIONAL
Brand: ETHICON SECURESTRAP

## 2019-04-26 RX ORDER — MIDAZOLAM HYDROCHLORIDE 1 MG/ML
INJECTION INTRAMUSCULAR; INTRAVENOUS AS NEEDED
Status: DISCONTINUED | OUTPATIENT
Start: 2019-04-26 | End: 2019-04-26 | Stop reason: SURG

## 2019-04-26 RX ORDER — LIDOCAINE HYDROCHLORIDE 10 MG/ML
INJECTION, SOLUTION INFILTRATION; PERINEURAL AS NEEDED
Status: DISCONTINUED | OUTPATIENT
Start: 2019-04-26 | End: 2019-04-26 | Stop reason: SURG

## 2019-04-26 RX ORDER — NEOSTIGMINE METHYLSULFATE 1 MG/ML
INJECTION INTRAVENOUS AS NEEDED
Status: DISCONTINUED | OUTPATIENT
Start: 2019-04-26 | End: 2019-04-26 | Stop reason: SURG

## 2019-04-26 RX ORDER — MAGNESIUM HYDROXIDE 1200 MG/15ML
LIQUID ORAL AS NEEDED
Status: DISCONTINUED | OUTPATIENT
Start: 2019-04-26 | End: 2019-04-26 | Stop reason: HOSPADM

## 2019-04-26 RX ORDER — PROPOFOL 10 MG/ML
INJECTION, EMULSION INTRAVENOUS AS NEEDED
Status: DISCONTINUED | OUTPATIENT
Start: 2019-04-26 | End: 2019-04-26 | Stop reason: SURG

## 2019-04-26 RX ORDER — SODIUM CHLORIDE, SODIUM LACTATE, POTASSIUM CHLORIDE, CALCIUM CHLORIDE 600; 310; 30; 20 MG/100ML; MG/100ML; MG/100ML; MG/100ML
125 INJECTION, SOLUTION INTRAVENOUS
Status: DISCONTINUED | OUTPATIENT
Start: 2019-04-26 | End: 2019-04-26 | Stop reason: HOSPADM

## 2019-04-26 RX ORDER — CEFAZOLIN SODIUM 2 G/50ML
2000 SOLUTION INTRAVENOUS
Status: COMPLETED | OUTPATIENT
Start: 2019-04-26 | End: 2019-04-26

## 2019-04-26 RX ORDER — ESMOLOL HYDROCHLORIDE 10 MG/ML
INJECTION INTRAVENOUS AS NEEDED
Status: DISCONTINUED | OUTPATIENT
Start: 2019-04-26 | End: 2019-04-26 | Stop reason: SURG

## 2019-04-26 RX ORDER — ROCURONIUM BROMIDE 10 MG/ML
INJECTION, SOLUTION INTRAVENOUS AS NEEDED
Status: DISCONTINUED | OUTPATIENT
Start: 2019-04-26 | End: 2019-04-26 | Stop reason: SURG

## 2019-04-26 RX ORDER — TRAMADOL HYDROCHLORIDE 50 MG/1
50 TABLET ORAL EVERY 6 HOURS PRN
Status: DISCONTINUED | OUTPATIENT
Start: 2019-04-26 | End: 2019-04-26 | Stop reason: HOSPADM

## 2019-04-26 RX ORDER — DEXAMETHASONE SODIUM PHOSPHATE 10 MG/ML
INJECTION, SOLUTION INTRAMUSCULAR; INTRAVENOUS AS NEEDED
Status: DISCONTINUED | OUTPATIENT
Start: 2019-04-26 | End: 2019-04-26 | Stop reason: SURG

## 2019-04-26 RX ORDER — ONDANSETRON 2 MG/ML
4 INJECTION INTRAMUSCULAR; INTRAVENOUS EVERY 8 HOURS PRN
Status: DISCONTINUED | OUTPATIENT
Start: 2019-04-26 | End: 2019-04-26 | Stop reason: HOSPADM

## 2019-04-26 RX ORDER — METOPROLOL TARTRATE 5 MG/5ML
INJECTION INTRAVENOUS AS NEEDED
Status: DISCONTINUED | OUTPATIENT
Start: 2019-04-26 | End: 2019-04-26 | Stop reason: SURG

## 2019-04-26 RX ORDER — CEFAZOLIN SODIUM 1 G/3ML
INJECTION, POWDER, FOR SOLUTION INTRAMUSCULAR; INTRAVENOUS AS NEEDED
Status: DISCONTINUED | OUTPATIENT
Start: 2019-04-26 | End: 2019-04-26 | Stop reason: SURG

## 2019-04-26 RX ORDER — HYDROMORPHONE HCL/PF 1 MG/ML
0.2 SYRINGE (ML) INJECTION
Status: DISCONTINUED | OUTPATIENT
Start: 2019-04-26 | End: 2019-04-26 | Stop reason: HOSPADM

## 2019-04-26 RX ORDER — FENTANYL CITRATE/PF 50 MCG/ML
50 SYRINGE (ML) INJECTION
Status: DISCONTINUED | OUTPATIENT
Start: 2019-04-26 | End: 2019-04-26 | Stop reason: HOSPADM

## 2019-04-26 RX ORDER — KETOROLAC TROMETHAMINE 30 MG/ML
INJECTION, SOLUTION INTRAMUSCULAR; INTRAVENOUS AS NEEDED
Status: DISCONTINUED | OUTPATIENT
Start: 2019-04-26 | End: 2019-04-26 | Stop reason: SURG

## 2019-04-26 RX ORDER — GLYCOPYRROLATE 0.2 MG/ML
INJECTION INTRAMUSCULAR; INTRAVENOUS AS NEEDED
Status: DISCONTINUED | OUTPATIENT
Start: 2019-04-26 | End: 2019-04-26 | Stop reason: SURG

## 2019-04-26 RX ORDER — FENTANYL CITRATE 50 UG/ML
INJECTION, SOLUTION INTRAMUSCULAR; INTRAVENOUS AS NEEDED
Status: DISCONTINUED | OUTPATIENT
Start: 2019-04-26 | End: 2019-04-26 | Stop reason: SURG

## 2019-04-26 RX ORDER — CEPHALEXIN 500 MG/1
500 CAPSULE ORAL EVERY 6 HOURS SCHEDULED
Qty: 15 CAPSULE | Refills: 0 | Status: SHIPPED | OUTPATIENT
Start: 2019-04-26 | End: 2019-05-01

## 2019-04-26 RX ORDER — ACETAMINOPHEN AND CODEINE PHOSPHATE 300; 30 MG/1; MG/1
1 TABLET ORAL EVERY 4 HOURS PRN
Qty: 30 TABLET | Refills: 0 | Status: SHIPPED | OUTPATIENT
Start: 2019-04-26 | End: 2019-05-06

## 2019-04-26 RX ORDER — LIDOCAINE HYDROCHLORIDE 10 MG/ML
0.5 INJECTION, SOLUTION EPIDURAL; INFILTRATION; INTRACAUDAL; PERINEURAL ONCE AS NEEDED
Status: DISCONTINUED | OUTPATIENT
Start: 2019-04-26 | End: 2019-04-26 | Stop reason: HOSPADM

## 2019-04-26 RX ORDER — ONDANSETRON 2 MG/ML
4 INJECTION INTRAMUSCULAR; INTRAVENOUS ONCE AS NEEDED
Status: DISCONTINUED | OUTPATIENT
Start: 2019-04-26 | End: 2019-04-26 | Stop reason: HOSPADM

## 2019-04-26 RX ORDER — SUCCINYLCHOLINE/SOD CL,ISO/PF 100 MG/5ML
SYRINGE (ML) INTRAVENOUS AS NEEDED
Status: DISCONTINUED | OUTPATIENT
Start: 2019-04-26 | End: 2019-04-26 | Stop reason: SURG

## 2019-04-26 RX ORDER — SODIUM CHLORIDE, SODIUM LACTATE, POTASSIUM CHLORIDE, CALCIUM CHLORIDE 600; 310; 30; 20 MG/100ML; MG/100ML; MG/100ML; MG/100ML
125 INJECTION, SOLUTION INTRAVENOUS CONTINUOUS
Status: DISCONTINUED | OUTPATIENT
Start: 2019-04-26 | End: 2019-04-26 | Stop reason: HOSPADM

## 2019-04-26 RX ORDER — BUPIVACAINE HYDROCHLORIDE 2.5 MG/ML
INJECTION, SOLUTION INFILTRATION; PERINEURAL AS NEEDED
Status: DISCONTINUED | OUTPATIENT
Start: 2019-04-26 | End: 2019-04-26 | Stop reason: HOSPADM

## 2019-04-26 RX ADMIN — FENTANYL CITRATE 100 MCG: 50 INJECTION, SOLUTION INTRAMUSCULAR; INTRAVENOUS at 11:45

## 2019-04-26 RX ADMIN — FENTANYL CITRATE 50 MCG: 50 INJECTION, SOLUTION INTRAMUSCULAR; INTRAVENOUS at 12:59

## 2019-04-26 RX ADMIN — GLYCOPYRROLATE 0.4 MG: 0.2 INJECTION, SOLUTION INTRAMUSCULAR; INTRAVENOUS at 12:33

## 2019-04-26 RX ADMIN — ENOXAPARIN SODIUM 40 MG: 40 INJECTION SUBCUTANEOUS at 11:22

## 2019-04-26 RX ADMIN — ROCURONIUM BROMIDE 30 MG: 10 INJECTION, SOLUTION INTRAVENOUS at 11:49

## 2019-04-26 RX ADMIN — NEOSTIGMINE METHYLSULFATE 4 MG: 1 INJECTION, SOLUTION INTRAVENOUS at 12:33

## 2019-04-26 RX ADMIN — MIDAZOLAM HYDROCHLORIDE 2 MG: 1 INJECTION, SOLUTION INTRAMUSCULAR; INTRAVENOUS at 11:38

## 2019-04-26 RX ADMIN — ESMOLOL HYDROCHLORIDE 30 MG: 10 INJECTION, SOLUTION INTRAVENOUS at 12:00

## 2019-04-26 RX ADMIN — Medication 200 MG: at 11:44

## 2019-04-26 RX ADMIN — METOPROLOL TARTRATE 2.5 MG: 5 INJECTION INTRAVENOUS at 12:18

## 2019-04-26 RX ADMIN — KETOROLAC TROMETHAMINE 30 MG: 30 INJECTION, SOLUTION INTRAMUSCULAR at 12:18

## 2019-04-26 RX ADMIN — DEXAMETHASONE SODIUM PHOSPHATE 4 MG: 10 INJECTION, SOLUTION INTRAMUSCULAR; INTRAVENOUS at 11:49

## 2019-04-26 RX ADMIN — CEFAZOLIN SODIUM 2000 MG: 2 SOLUTION INTRAVENOUS at 11:47

## 2019-04-26 RX ADMIN — PROPOFOL 200 MG: 10 INJECTION, EMULSION INTRAVENOUS at 11:45

## 2019-04-26 RX ADMIN — TRAMADOL HYDROCHLORIDE 50 MG: 50 TABLET, COATED ORAL at 13:54

## 2019-04-26 RX ADMIN — ESMOLOL HYDROCHLORIDE 40 MG: 10 INJECTION, SOLUTION INTRAVENOUS at 12:07

## 2019-04-26 RX ADMIN — PROPOFOL 100 MG: 10 INJECTION, EMULSION INTRAVENOUS at 11:46

## 2019-04-26 RX ADMIN — SODIUM CHLORIDE, SODIUM LACTATE, POTASSIUM CHLORIDE, AND CALCIUM CHLORIDE 125 ML/HR: .6; .31; .03; .02 INJECTION, SOLUTION INTRAVENOUS at 11:24

## 2019-04-26 RX ADMIN — LIDOCAINE HYDROCHLORIDE 100 MG: 10 INJECTION, SOLUTION INFILTRATION; PERINEURAL at 11:44

## 2019-04-26 RX ADMIN — CEFAZOLIN 1000 MG: 1 INJECTION, POWDER, FOR SOLUTION INTRAVENOUS at 11:47

## 2019-05-16 ENCOUNTER — OFFICE VISIT (OUTPATIENT)
Dept: SURGERY | Facility: CLINIC | Age: 54
End: 2019-05-16

## 2019-05-16 VITALS
WEIGHT: 279.4 LBS | RESPIRATION RATE: 16 BRPM | BODY MASS INDEX: 40 KG/M2 | HEIGHT: 70 IN | SYSTOLIC BLOOD PRESSURE: 124 MMHG | HEART RATE: 85 BPM | TEMPERATURE: 98 F | DIASTOLIC BLOOD PRESSURE: 80 MMHG

## 2019-05-16 DIAGNOSIS — Z48.89 POSTOPERATIVE VISIT: Primary | ICD-10-CM

## 2019-05-16 PROCEDURE — 99024 POSTOP FOLLOW-UP VISIT: CPT | Performed by: SURGERY

## 2019-06-11 ENCOUNTER — OFFICE VISIT (OUTPATIENT)
Dept: SURGERY | Facility: CLINIC | Age: 54
End: 2019-06-11

## 2019-06-11 VITALS
WEIGHT: 279.4 LBS | DIASTOLIC BLOOD PRESSURE: 86 MMHG | RESPIRATION RATE: 12 BRPM | SYSTOLIC BLOOD PRESSURE: 134 MMHG | HEART RATE: 82 BPM | TEMPERATURE: 98.6 F | HEIGHT: 70 IN | BODY MASS INDEX: 40 KG/M2

## 2019-06-11 DIAGNOSIS — Z48.89 POSTOPERATIVE VISIT: Primary | ICD-10-CM

## 2019-06-11 PROCEDURE — 99024 POSTOP FOLLOW-UP VISIT: CPT | Performed by: SURGERY

## 2019-06-17 DIAGNOSIS — I10 ESSENTIAL HYPERTENSION: ICD-10-CM

## 2019-06-17 RX ORDER — LISINOPRIL 40 MG/1
40 TABLET ORAL DAILY
Qty: 90 TABLET | Refills: 1 | Status: SHIPPED | OUTPATIENT
Start: 2019-06-17 | End: 2019-12-26 | Stop reason: SDUPTHER

## 2019-07-11 DIAGNOSIS — I10 BENIGN ESSENTIAL HYPERTENSION: ICD-10-CM

## 2019-07-11 RX ORDER — ATORVASTATIN CALCIUM 20 MG/1
TABLET, FILM COATED ORAL
Qty: 180 TABLET | Refills: 0 | Status: SHIPPED | OUTPATIENT
Start: 2019-07-11 | End: 2019-10-19 | Stop reason: SDUPTHER

## 2019-07-21 DIAGNOSIS — I10 BENIGN ESSENTIAL HYPERTENSION: Chronic | ICD-10-CM

## 2019-07-22 RX ORDER — AMLODIPINE BESYLATE 5 MG/1
TABLET ORAL
Qty: 180 TABLET | Refills: 0 | Status: SHIPPED | OUTPATIENT
Start: 2019-07-22 | End: 2019-11-03 | Stop reason: SDUPTHER

## 2019-07-25 DIAGNOSIS — E11.9 TYPE 2 DIABETES MELLITUS WITHOUT COMPLICATION, WITHOUT LONG-TERM CURRENT USE OF INSULIN (HCC): ICD-10-CM

## 2019-07-25 RX ORDER — GLYBURIDE-METFORMIN HYDROCHLORIDE 5; 500 MG/1; MG/1
TABLET ORAL
Qty: 360 TABLET | Refills: 3 | Status: SHIPPED | OUTPATIENT
Start: 2019-07-25 | End: 2020-08-25

## 2019-09-30 DIAGNOSIS — K21.9 GASTROESOPHAGEAL REFLUX DISEASE WITHOUT ESOPHAGITIS: ICD-10-CM

## 2019-09-30 RX ORDER — PANTOPRAZOLE SODIUM 40 MG/1
TABLET, DELAYED RELEASE ORAL
Qty: 180 TABLET | Refills: 0 | Status: SHIPPED | OUTPATIENT
Start: 2019-09-30 | End: 2019-09-30 | Stop reason: SDUPTHER

## 2019-09-30 RX ORDER — PANTOPRAZOLE SODIUM 40 MG/1
40 TABLET, DELAYED RELEASE ORAL 2 TIMES DAILY
Qty: 180 TABLET | Refills: 0 | Status: SHIPPED | OUTPATIENT
Start: 2019-09-30 | End: 2019-09-30 | Stop reason: SDUPTHER

## 2019-09-30 RX ORDER — PANTOPRAZOLE SODIUM 40 MG/1
40 TABLET, DELAYED RELEASE ORAL 2 TIMES DAILY
Qty: 180 TABLET | Refills: 0 | Status: SHIPPED | OUTPATIENT
Start: 2019-09-30 | End: 2019-10-01 | Stop reason: SDUPTHER

## 2019-10-01 DIAGNOSIS — K21.9 GASTROESOPHAGEAL REFLUX DISEASE WITHOUT ESOPHAGITIS: ICD-10-CM

## 2019-10-01 RX ORDER — PANTOPRAZOLE SODIUM 40 MG/1
TABLET, DELAYED RELEASE ORAL
Qty: 180 TABLET | Refills: 0 | Status: SHIPPED | OUTPATIENT
Start: 2019-10-01 | End: 2020-04-29

## 2019-10-08 DIAGNOSIS — K21.9 GASTROESOPHAGEAL REFLUX DISEASE WITHOUT ESOPHAGITIS: Primary | ICD-10-CM

## 2019-10-09 RX ORDER — METOCLOPRAMIDE 10 MG/1
10 TABLET ORAL DAILY
Qty: 30 TABLET | Refills: 0 | Status: SHIPPED | OUTPATIENT
Start: 2019-10-09 | End: 2019-11-10 | Stop reason: SDUPTHER

## 2019-10-19 DIAGNOSIS — I10 BENIGN ESSENTIAL HYPERTENSION: ICD-10-CM

## 2019-10-21 RX ORDER — ATORVASTATIN CALCIUM 20 MG/1
TABLET, FILM COATED ORAL
Qty: 180 TABLET | Refills: 0 | Status: SHIPPED | OUTPATIENT
Start: 2019-10-21 | End: 2020-01-20

## 2019-11-03 DIAGNOSIS — I10 BENIGN ESSENTIAL HYPERTENSION: Chronic | ICD-10-CM

## 2019-11-04 RX ORDER — AMLODIPINE BESYLATE 5 MG/1
TABLET ORAL
Qty: 180 TABLET | Refills: 0 | Status: SHIPPED | OUTPATIENT
Start: 2019-11-04 | End: 2020-02-07

## 2019-11-10 DIAGNOSIS — K21.9 GASTROESOPHAGEAL REFLUX DISEASE WITHOUT ESOPHAGITIS: ICD-10-CM

## 2019-11-11 RX ORDER — METOCLOPRAMIDE 10 MG/1
TABLET ORAL
Qty: 30 TABLET | Refills: 0 | Status: SHIPPED | OUTPATIENT
Start: 2019-11-11 | End: 2019-12-06 | Stop reason: SDUPTHER

## 2019-12-06 DIAGNOSIS — K21.9 GASTROESOPHAGEAL REFLUX DISEASE WITHOUT ESOPHAGITIS: ICD-10-CM

## 2019-12-06 RX ORDER — METOCLOPRAMIDE 10 MG/1
TABLET ORAL
Qty: 30 TABLET | Refills: 0 | Status: SHIPPED | OUTPATIENT
Start: 2019-12-06 | End: 2020-01-04 | Stop reason: SDUPTHER

## 2019-12-26 DIAGNOSIS — I10 ESSENTIAL HYPERTENSION: ICD-10-CM

## 2019-12-27 RX ORDER — LISINOPRIL 40 MG/1
TABLET ORAL
Qty: 90 TABLET | Refills: 0 | Status: SHIPPED | OUTPATIENT
Start: 2019-12-27 | End: 2020-03-12 | Stop reason: SDUPTHER

## 2020-01-04 ENCOUNTER — APPOINTMENT (EMERGENCY)
Dept: CT IMAGING | Facility: HOSPITAL | Age: 55
End: 2020-01-04
Payer: COMMERCIAL

## 2020-01-04 ENCOUNTER — HOSPITAL ENCOUNTER (EMERGENCY)
Facility: HOSPITAL | Age: 55
Discharge: HOME | End: 2020-01-04
Attending: EMERGENCY MEDICINE | Admitting: EMERGENCY MEDICINE
Payer: COMMERCIAL

## 2020-01-04 VITALS
RESPIRATION RATE: 18 BRPM | DIASTOLIC BLOOD PRESSURE: 88 MMHG | WEIGHT: 280 LBS | SYSTOLIC BLOOD PRESSURE: 168 MMHG | HEART RATE: 96 BPM | TEMPERATURE: 98.2 F | BODY MASS INDEX: 40.18 KG/M2 | OXYGEN SATURATION: 96 %

## 2020-01-04 DIAGNOSIS — K21.9 GASTROESOPHAGEAL REFLUX DISEASE WITHOUT ESOPHAGITIS: ICD-10-CM

## 2020-01-04 DIAGNOSIS — N20.1 LEFT URETERAL STONE: Primary | ICD-10-CM

## 2020-01-04 DIAGNOSIS — R11.2 NAUSEA & VOMITING: ICD-10-CM

## 2020-01-04 LAB
ALBUMIN SERPL BCP-MCNC: 3.7 G/DL (ref 3.5–5)
ALP SERPL-CCNC: 128 U/L (ref 46–116)
ALT SERPL W P-5'-P-CCNC: 65 U/L (ref 12–78)
ANION GAP SERPL CALCULATED.3IONS-SCNC: 10 MMOL/L (ref 4–13)
AST SERPL W P-5'-P-CCNC: 38 U/L (ref 5–45)
ATRIAL RATE: 90 BPM
BACTERIA UR QL AUTO: ABNORMAL /HPF
BASOPHILS # BLD AUTO: 0.05 THOUSANDS/ΜL (ref 0–0.1)
BASOPHILS NFR BLD AUTO: 0 % (ref 0–1)
BILIRUB SERPL-MCNC: 0.7 MG/DL (ref 0.2–1)
BILIRUB UR QL STRIP: NEGATIVE
BUN SERPL-MCNC: 19 MG/DL (ref 5–25)
CALCIUM SERPL-MCNC: 8.9 MG/DL (ref 8.3–10.1)
CHLORIDE SERPL-SCNC: 100 MMOL/L (ref 100–108)
CLARITY UR: CLEAR
CO2 SERPL-SCNC: 26 MMOL/L (ref 21–32)
COLOR UR: YELLOW
CREAT SERPL-MCNC: 1.67 MG/DL (ref 0.6–1.3)
EOSINOPHIL # BLD AUTO: 0 THOUSAND/ΜL (ref 0–0.61)
EOSINOPHIL NFR BLD AUTO: 0 % (ref 0–6)
ERYTHROCYTE [DISTWIDTH] IN BLOOD BY AUTOMATED COUNT: 12.8 % (ref 11.6–15.1)
GFR SERPL CREATININE-BSD FRML MDRD: 45 ML/MIN/1.73SQ M
GLUCOSE SERPL-MCNC: 258 MG/DL (ref 65–140)
GLUCOSE SERPL-MCNC: 262 MG/DL (ref 65–140)
GLUCOSE UR STRIP-MCNC: ABNORMAL MG/DL
HCT VFR BLD AUTO: 42.7 % (ref 36.5–49.3)
HGB BLD-MCNC: 14.3 G/DL (ref 12–17)
HGB UR QL STRIP.AUTO: ABNORMAL
IMM GRANULOCYTES # BLD AUTO: 0.1 THOUSAND/UL (ref 0–0.2)
IMM GRANULOCYTES NFR BLD AUTO: 1 % (ref 0–2)
KETONES UR STRIP-MCNC: NEGATIVE MG/DL
LEUKOCYTE ESTERASE UR QL STRIP: NEGATIVE
LIPASE SERPL-CCNC: 184 U/L (ref 73–393)
LYMPHOCYTES # BLD AUTO: 1.97 THOUSANDS/ΜL (ref 0.6–4.47)
LYMPHOCYTES NFR BLD AUTO: 17 % (ref 14–44)
MCH RBC QN AUTO: 29 PG (ref 26.8–34.3)
MCHC RBC AUTO-ENTMCNC: 33.5 G/DL (ref 31.4–37.4)
MCV RBC AUTO: 87 FL (ref 82–98)
MONOCYTES # BLD AUTO: 0.82 THOUSAND/ΜL (ref 0.17–1.22)
MONOCYTES NFR BLD AUTO: 7 % (ref 4–12)
NEUTROPHILS # BLD AUTO: 8.97 THOUSANDS/ΜL (ref 1.85–7.62)
NEUTS SEG NFR BLD AUTO: 75 % (ref 43–75)
NITRITE UR QL STRIP: NEGATIVE
NON-SQ EPI CELLS URNS QL MICRO: ABNORMAL /HPF
NRBC BLD AUTO-RTO: 0 /100 WBCS
P AXIS: 45 DEGREES
PH UR STRIP.AUTO: 5.5 [PH]
PLATELET # BLD AUTO: 361 THOUSANDS/UL (ref 149–390)
PMV BLD AUTO: 9.9 FL (ref 8.9–12.7)
POTASSIUM SERPL-SCNC: 3.8 MMOL/L (ref 3.5–5.3)
PR INTERVAL: 184 MS
PROT SERPL-MCNC: 7.6 G/DL (ref 6.4–8.2)
PROT UR STRIP-MCNC: NEGATIVE MG/DL
QRS AXIS: 0 DEGREES
QRSD INTERVAL: 142 MS
QT INTERVAL: 392 MS
QTC INTERVAL: 479 MS
RBC # BLD AUTO: 4.93 MILLION/UL (ref 3.88–5.62)
RBC #/AREA URNS AUTO: ABNORMAL /HPF
SODIUM SERPL-SCNC: 136 MMOL/L (ref 136–145)
SP GR UR STRIP.AUTO: <=1.005 (ref 1–1.03)
T WAVE AXIS: 4 DEGREES
TROPONIN I SERPL-MCNC: <0.02 NG/ML
UROBILINOGEN UR QL STRIP.AUTO: 0.2 E.U./DL
VENTRICULAR RATE: 90 BPM
WBC # BLD AUTO: 11.91 THOUSAND/UL (ref 4.31–10.16)
WBC #/AREA URNS AUTO: ABNORMAL /HPF

## 2020-01-04 PROCEDURE — 96361 HYDRATE IV INFUSION ADD-ON: CPT

## 2020-01-04 PROCEDURE — 36415 COLL VENOUS BLD VENIPUNCTURE: CPT | Performed by: PHYSICIAN ASSISTANT

## 2020-01-04 PROCEDURE — 84484 ASSAY OF TROPONIN QUANT: CPT | Performed by: PHYSICIAN ASSISTANT

## 2020-01-04 PROCEDURE — 83690 ASSAY OF LIPASE: CPT | Performed by: PHYSICIAN ASSISTANT

## 2020-01-04 PROCEDURE — 82948 REAGENT STRIP/BLOOD GLUCOSE: CPT

## 2020-01-04 PROCEDURE — 74177 CT ABD & PELVIS W/CONTRAST: CPT

## 2020-01-04 PROCEDURE — 85025 COMPLETE CBC W/AUTO DIFF WBC: CPT | Performed by: PHYSICIAN ASSISTANT

## 2020-01-04 PROCEDURE — 81001 URINALYSIS AUTO W/SCOPE: CPT | Performed by: PHYSICIAN ASSISTANT

## 2020-01-04 PROCEDURE — 80053 COMPREHEN METABOLIC PANEL: CPT | Performed by: PHYSICIAN ASSISTANT

## 2020-01-04 PROCEDURE — 96375 TX/PRO/DX INJ NEW DRUG ADDON: CPT

## 2020-01-04 PROCEDURE — 93005 ELECTROCARDIOGRAM TRACING: CPT

## 2020-01-04 PROCEDURE — 99284 EMERGENCY DEPT VISIT MOD MDM: CPT

## 2020-01-04 PROCEDURE — 96374 THER/PROPH/DIAG INJ IV PUSH: CPT

## 2020-01-04 PROCEDURE — 99285 EMERGENCY DEPT VISIT HI MDM: CPT | Performed by: PHYSICIAN ASSISTANT

## 2020-01-04 PROCEDURE — 93010 ELECTROCARDIOGRAM REPORT: CPT | Performed by: INTERNAL MEDICINE

## 2020-01-04 RX ORDER — METOCLOPRAMIDE HYDROCHLORIDE 5 MG/ML
10 INJECTION INTRAMUSCULAR; INTRAVENOUS ONCE
Status: COMPLETED | OUTPATIENT
Start: 2020-01-04 | End: 2020-01-04

## 2020-01-04 RX ORDER — TAMSULOSIN HYDROCHLORIDE 0.4 MG/1
0.4 CAPSULE ORAL
Qty: 7 CAPSULE | Refills: 0 | Status: SHIPPED | OUTPATIENT
Start: 2020-01-04 | End: 2020-02-03

## 2020-01-04 RX ORDER — METOCLOPRAMIDE 10 MG/1
10 TABLET ORAL DAILY
Qty: 30 TABLET | Refills: 0 | Status: SHIPPED | OUTPATIENT
Start: 2020-01-04 | End: 2020-01-07 | Stop reason: SDUPTHER

## 2020-01-04 RX ORDER — IBUPROFEN 800 MG/1
800 TABLET ORAL 3 TIMES DAILY
Qty: 21 TABLET | Refills: 0 | Status: SHIPPED | OUTPATIENT
Start: 2020-01-04 | End: 2020-02-03

## 2020-01-04 RX ORDER — DIPHENHYDRAMINE HYDROCHLORIDE 50 MG/ML
25 INJECTION INTRAMUSCULAR; INTRAVENOUS ONCE
Status: COMPLETED | OUTPATIENT
Start: 2020-01-04 | End: 2020-01-04

## 2020-01-04 RX ADMIN — IOHEXOL 100 ML: 350 INJECTION, SOLUTION INTRAVENOUS at 17:05

## 2020-01-04 RX ADMIN — SODIUM CHLORIDE 1000 ML: 0.9 INJECTION, SOLUTION INTRAVENOUS at 16:03

## 2020-01-04 RX ADMIN — METOCLOPRAMIDE 10 MG: 5 INJECTION, SOLUTION INTRAMUSCULAR; INTRAVENOUS at 16:06

## 2020-01-04 RX ADMIN — DIPHENHYDRAMINE HYDROCHLORIDE 25 MG: 50 INJECTION, SOLUTION INTRAMUSCULAR; INTRAVENOUS at 16:06

## 2020-01-04 NOTE — ED PROVIDER NOTES
History  Chief Complaint   Patient presents with    Vomiting     pt c/o vomiting and generilized abdominal pain  Pt is diabetic  pt states he cant keep fluids down  50yo male with a PMH of type 2 diabetes, gastroparesis, GERD, hypertension, hyperlipidemia presenting for evaluation of nausea, vomiting, and abdominal pain that began 4 days ago  No recent travel or suspicious food intake  Patient's symptoms were initially improving yesterday and he was able to tolerate soup and small sips of liquids  Patient ran out of his Reglan about 2 days ago  Patient's symptoms are worsening today and he has not been able to keep anything down  Patient also complains of LLQ abdominal pain  Patient reports chills and fatigue  He initially had diarrhea but that has resolved  Patient was on taking azithromycin last week for a sore throat  Patient denies fevers, hematochezia, hematemesis, dysuria, urinary frequency, chest pain, shortness of breath  Previous abdominal surgeries include an umbilical hernia repair  History provided by:  Patient   used: No    Vomiting   Severity:  Moderate  Duration:  1 day  Timing:  Constant  Number of daily episodes:  Unable to specify  Quality:  Undigested food  Progression:  Unchanged  Chronicity:  New  Recent urination:  Decreased  Context: not post-tussive and not self-induced    Relieved by:  Nothing  Worsened by:  Liquids  Ineffective treatments:  Antiemetics  Associated symptoms: abdominal pain and chills    Associated symptoms: no arthralgias, no cough, no diarrhea, no fever, no headaches, no myalgias, no sore throat and no URI        Prior to Admission Medications   Prescriptions Last Dose Informant Patient Reported? Taking?    FLUZONE QUADRIVALENT 0 5 ML TIM  Self Yes No   Sig: ADM 0 5ML IM UTD   Insulin Pen Needle (PEN NEEDLES) 31G X 5 MM MISC  Self No No   Sig: by Does not apply route 4 (four) times a day   Needle, Disp, (HYPODERMIC NEEDLE) 23G X 1-1/2" MISC Self No No   Sig: by Does not apply route once a week   amLODIPine (NORVASC) 5 mg tablet   No No   Sig: TAKE 1 TABLET BY MOUTH TWICE A DAY   aspirin 81 MG tablet  Self Yes No   Sig: Take 81 mg by mouth daily  atorvastatin (LIPITOR) 20 mg tablet   No No   Sig: TAKE 2 TABLETS BY MOUTH EVERY DAY   glyBURIDE-metFORMIN (GLUCOVANCE) 5-500 MG per tablet   No No   Sig: Take 2 tablets by mouth 2 times a day with meals   insulin lispro (HUMALOG KWIKPEN) 100 units/mL injection pen  Self No No   Sig: Check sugar before meals and at bedtime    Sugar 150-199,1 unit, 200-249 3 units, 250-299 5 units, 300-349 7 units greater than 349, 8 units   lisinopril (ZESTRIL) 40 mg tablet   No No   Sig: TAKE 1 TABLET BY MOUTH EVERY DAY   loratadine-pseudoephedrine (CLARITIN-D 24-HOUR)  mg per 24 hr tablet  Self No No   Sig: Take 1 tablet by mouth daily   metoclopramide (REGLAN) 10 mg tablet   No No   Sig: TAKE 1 TABLET BY MOUTH EVERY DAY   metoclopramide (REGLAN) 10 mg tablet   No No   Sig: Take 1 tablet (10 mg total) by mouth daily   pantoprazole (PROTONIX) 40 mg tablet   No No   Sig: TAKE 1 TABLET BY MOUTH TWICE A DAY   sildenafil (REVATIO) 20 mg tablet  Self No No   Sig: Take 3-5 tablets PO PRN erectile dysfunction   sitaGLIPtin (JANUVIA) 100 mg tablet  Self No No   Sig: Take 1 tablet (100 mg total) by mouth daily   testosterone cypionate (DEPO-TESTOSTERONE) 200 mg/mL SOLN  Self No No   Sig: Inject 1 mL (200 mg total) into a muscle once a week      Facility-Administered Medications: None       Past Medical History:   Diagnosis Date    Acute renal failure ([place] [address] )     [id number] resolved    Diabetes mellitus ([place] [address] )     Enteritis 8/23/2016    [facility]ropares[facility] to DM [facility] ) 8/23/2016    GERD (gastroesophageal reflux disease)     HLD (hyperlipidemia) 8/23/2016    HTN (hypertension), benign 8/23/2016    Hyperlipidemia     Hypertension     Intractable vomiting with nausea 8/23/2016       Past Surgical History:   Procedure Laterality Date    ESOPHAGOGASTRODUODENOSCOPY N/A 8/24/2016    Procedure: ESOPHAGOGASTRODUODENOSCOPY (EGD); Surgeon: Pebbles Mack MD;  [facility]; Service:     TONSILLECTOMY      UMBILICAL HERNIA REPAIR      UMBILICAL HERNIA REPAIR LAPAROSCOPIC N/A 4/26/2019    Procedure: LAPAROSCOPIC UMBILICAL HERNIA REPAIR;  Surgeon: Rmairo Mcconnell MD;  Location: [facility];  Service: General       Family History   Problem Relation Age of Onset    Diabetes Mother         mellitus    Lung cancer Mother     Coronary artery disease Father      I have reviewed and agree with the history as documented  Social History     Tobacco Use    Smoking status: Never Smoker    Smokeless tobacco: Never Used   Substance Use Topics    Alcohol use: Yes     Frequency: Monthly or less     Drinks per session: 1 or 2     Binge frequency: Never    Drug use: No        Review of Systems   Constitutional: Positive for chills  Negative for fever  HENT: Negative for dental problem and sore throat  Eyes: Negative for discharge and redness  Respiratory: Negative for cough and shortness of breath  Cardiovascular: Negative for chest pain and leg swelling  Gastrointestinal: Positive for abdominal pain, nausea and vomiting  Negative for abdominal distention and diarrhea  Genitourinary: Negative for dysuria, frequency and hematuria  Musculoskeletal: Negative for arthralgias, myalgias, neck pain and neck stiffness  Skin: Negative for color change and rash  Allergic/Immunologic: Negative for immunocompromised state  Neurological: Negative for syncope and headaches  Hematological: Does not bruise/bleed easily  Psychiatric/Behavioral: Negative for confusion  All other systems reviewed and are negative  Physical Exam  Physical Exam   Constitutional: He appears well-developed and well-nourished  No distress  Nontoxic appearing   HENT:   Head: Normocephalic and atraumatic     Right Ear: External ear normal    Left Ear: External ear normal    Mouth/Throat: Oropharynx is clear and moist    Eyes: Conjunctivae are normal  Right eye exhibits no discharge  Left eye exhibits no discharge  No scleral icterus  Neck: Normal range of motion  Neck supple  Cardiovascular: Normal rate, regular rhythm and normal heart sounds  No murmur heard  Pulmonary/Chest: Effort normal and breath sounds normal  No stridor  No respiratory distress  He has no wheezes  He has no rales  Abdominal: Soft  Bowel sounds are normal  He exhibits no distension  There is tenderness in the left lower quadrant  There is no rigidity, no rebound and no guarding  Musculoskeletal: Normal range of motion  He exhibits no deformity  Neurological: He is alert  He is not disoriented  GCS eye subscore is 4  GCS verbal subscore is 5  GCS motor subscore is 6  Skin: Skin is warm and dry  He is not diaphoretic  Psychiatric: He has a normal mood and affect  His behavior is normal    Nursing note and vitals reviewed        Vital Signs  ED Triage Vitals [01/04/20 1521]   Temperature Pulse Respirations Blood Pressure SpO2   98 2 °F (36 8 °C) 96 18 168/88 96 %      Temp Source Heart Rate Source Patient Position - Orthostatic VS BP Location FiO2 (%)   Oral Monitor Sitting Left arm --      Pain Score       --           Vitals:    01/04/20 1521   BP: 168/88   Pulse: 96   Patient Position - Orthostatic VS: Sitting         Visual Acuity      ED Medications  Medications   sodium chloride 0 9 % bolus 1,000 mL (0 mL Intravenous Stopped 1/4/20 1849)   metoclopramide (REGLAN) injection 10 mg (10 mg Intravenous Given 1/4/20 1606)   diphenhydrAMINE (BENADRYL) injection 25 mg (25 mg Intravenous Given 1/4/20 1606)   iohexol (OMNIPAQUE) 350 MG/ML injection (MULTI-DOSE) 100 mL (100 mL Intravenous Given 1/4/20 1705)       Diagnostic Studies  Results Reviewed     Procedure Component Value Units Date/Time    Urine Microscopic [[id number]]  (Abnormal) Collected:  01/04/20 0820    Lab Status: Final result Specimen:  Urine, Clean Catch Updated:  01/04/20 1804     RBC, UA 10-20 /hpf      WBC, UA None Seen /hpf      Epithelial Cells Occasional /hpf      Bacteria, UA None Seen /hpf     UA w Reflex to Microscopic w Reflex to Culture [[id number]]  (Abnormal) Collected:  01/04/20 1744    Lab Status:  Final result Specimen:  Urine, Clean Catch Updated:  01/04/20 1752     Color, UA Yellow     Clarity, UA Clear     Specific Gravity, UA <=1 005     pH, UA 5 5     Leukocytes, UA Negative     Nitrite, UA Negative     Protein, UA Negative mg/dl      Glucose,  (1/4%) mg/dl      Ketones, UA Negative mg/dl      Urobilinogen, UA 0 2 E U /dl      Bilirubin, UA Negative     Blood, UA Large    Troponin I [[id number]]  (Normal) Collected:  01/04/20 1601    Lab Status:  Final result Specimen:  Blood from Arm, Right Updated:  01/04/20 1629     Troponin I <0 02 ng/mL     Comprehensive metabolic panel [[id number]]  (Abnormal) Collected:  01/04/20 1601    Lab Status:  Final result Specimen:  Blood from Arm, Right Updated:  01/04/20 1626     Sodium 136 mmol/L      Potassium 3 8 mmol/L      Chloride 100 mmol/L      CO2 26 mmol/L      ANION GAP 10 mmol/L      BUN 19 mg/dL      Creatinine 1 67 mg/dL      Glucose 262 mg/dL      Calcium 8 9 mg/dL      AST 38 U/L      ALT 65 U/L      Alkaline Phosphatase 128 U/L      Total Protein 7 6 g/dL      Albumin 3 7 g/dL      Total Bilirubin 0 70 mg/dL      eGFR 45 ml/min/1 73sq m     Narrative:       Tressa guidelines for Chronic Kidney Disease (CKD):     Stage 1 with normal or high GFR (GFR > 90 mL/min/1 73 square meters)    Stage 2 Mild CKD (GFR = 60-89 mL/min/1 73 square meters)    Stage 3A Moderate CKD (GFR = 45-59 mL/min/1 73 square meters)    Stage 3B Moderate CKD (GFR = 30-44 mL/min/1 73 square meters)    Stage 4 Severe CKD (GFR = 15-29 mL/min/1 73 square meters)    Stage 5 End Stage CKD (GFR <15 mL/min/1 73 square meters)  Note: GFR calculation is accurate only with a steady state creatinine    Lipase [[id number]]  (Normal) Collected:  01/04/20 1601    Lab Status:  Final result Specimen:  Blood from Arm, Right Updated:  01/04/20 1626     Lipase 184 u/L     CBC and differential [[id number]]  (Abnormal) Collected:  01/04/20 1601    Lab Status:  Final result Specimen:  Blood from Arm, Right Updated:  01/04/20 1608     WBC 11 91 Thousand/uL      RBC 4 93 Million/uL      Hemoglobin 14 3 g/dL      Hematocrit 42 7 %      MCV 87 fL      MCH 29 0 pg      MCHC 33 5 g/dL      RDW 12 8 %      MPV 9 9 fL      Platelets 704 Thousands/uL      nRBC 0 /100 WBCs      Neutrophils Relative 75 %      Immat GRANS % 1 %      Lymphocytes Relative 17 %      Monocytes Relative 7 %      Eosinophils Relative 0 %      Basophils Relative 0 %      Neutrophils Absolute 8 97 Thousands/µL      Immature Grans Absolute 0 10 Thousand/uL      Lymphocytes Absolute 1 97 Thousands/µL      Monocytes Absolute 0 82 Thousand/µL      Eosinophils Absolute 0 00 Thousand/µL      Basophils Absolute 0 05 Thousands/µL     Fingerstick Glucose (POCT) [[id number]]  (Abnormal) Collected:  01/04/20 1524    Lab Status:  Final result Updated:  01/04/20 1525     POC Glucose 258 mg/dl                  CT abdomen pelvis with contrast   Final Result by Luisa Vizcaino MD (01/04 1742)      Mild left hydronephrosis  Obstructing 8 x 5 x 6 mm calculus in the left ureter at the L3-4 level              Workstation performed: [id number]                    Procedures  ECG 12 Lead Documentation Only  Date/Time: 1/4/2020 10:20 PM  Performed by: Moshe Myles PA-C  Authorized by: Moshe Myles PA-C     Indications / Diagnosis:  Abd pain, vomiting  ECG reviewed by me, the ED Provider: yes    Patient location:  ED  Previous ECG:     Comparison to cardiac monitor: Yes    Interpretation:     Interpretation: abnormal    Rate:     ECG rate:  90    ECG rate assessment: normal    Rhythm:     Rhythm: sinus rhythm    Ectopy: Ectopy: none    QRS:     QRS axis:  Normal    QRS intervals: Wide  Conduction:     Conduction: abnormal      Abnormal conduction: complete RBBB    ST segments:     ST segments:  Normal  T waves:     T waves: non-specific               ED Course  ED Course as of Jan 04 2214   Sat Jan 04, 2020   7100 Patient able to tolerate PO challenge  He states he is hungry and would like to go home  MDM  Number of Diagnoses or Management Options  Left ureteral stone: new and requires workup  Nausea & vomiting: new and requires workup  Diagnosis management comments: 50yo male with a PMH of type 2 diabetes and gastroparesis presenting for evaluation of nausea, vomiting, and LLQ abdominal pain  Patient has been unable to keep down any liquids today and feels weak  LLQ abdominal pain on exam without signs of peritonitis  Differential diagnosis includes but is not limited to: gastritis, GERD, pancreatitis, hepatitis, cholecystitis, cholelithiasis, colitis, diverticulitis, appendicitis, mesenteric adenitis, UTI, pyelonephritis, SBO, constipation, kidney stone, musculoskeletal, DKA, gastroparesis, nonspecific abdominal pain  Initial ED plan: Check abdominal labs, UA, and CT abdomen for further evaluation  Will check troponin and EKG to r/o cardiac etiology  IV fluids and Reglan for symptoms  Final assessment: UA reveals large amount of blood without signs of infection  Creatinine increased at 1 67  Last creatinine checked was 9 months ago and was 1 5 at that time  Remainder of labs unremarkable  No acidosis or ketones present to suggest DKA  CT reveals 8cm x 5cm x 6cm stone in left ureter with mild hydronephrosis  Upon further questioning, patient has a history of 1 kidney stone in the past that required lithotripsy  On re-evaluation, patient feels significantly better after fluids and is pain free  Patient was able to tolerate PO challenge and feels well for discharge   No current indication for admission given that pain has resolved  Stressed the need for patient to increase his fluids  Scripts given for Reglan, Flomax, and ibuprofen  Strainer given and patient education provided  Urology referral given for close follow-up  ED return precautions discussed including uncontrolled pain and inability to tolerate PO  Patient expressed understanding and is agreeable to plan  Patient discharged in stable condition  Amount and/or Complexity of Data Reviewed  Clinical lab tests: ordered and reviewed  Tests in the radiology section of CPT®: ordered and reviewed  Review and summarize past medical records: yes  Independent visualization of images, tracings, or specimens: yes    Risk of Complications, Morbidity, and/or Mortality  Presenting problems: moderate  Diagnostic procedures: moderate  Management options: moderate    Patient Progress  Patient progress: improved        Disposition  Final diagnoses:   Left ureteral stone   Nausea & vomiting     Time reflects when diagnosis was documented in both MDM as applicable and the Disposition within this note     Time User Action Codes Description Comment    1/4/2020  6:38  [address], [place] [N20 1] Left ureteral stone     1/4/2020  6:38  Rockmelty, [place] [R11 2] Nausea & vomiting     1/4/2020  6:39  [place]ty, [place] [K21 9] Gastroesophageal reflux disease without esophagitis       ED Disposition     ED Disposition Condition Date/Time Comment    Discharge Stable Sat Jan 4, 2020  6:38 PM Marvin Sommer discharge to home/self care              Follow-up Information     Follow up With Specialties Details Why Contact Info Additional Information    Yane Amaro MD Internal Medicine Schedule an appointment as soon as possible for a visit   [address] [facility] Schedule an appointment as soon as possible for a visit   [address]  1121 [facility] [id number]  700  [facility], [address], [address], [place], [place], [address]    [facility] Emergency Department Emergency Medicine  If symptoms worsen 34 [facility] [place] [address]  [address] [place] [address] ED, 36 [facility], [place], [place], [address]          Discharge Medication List as of 1/4/2020  6:41 PM      START taking these medications    Details   ibuprofen (MOTRIN) 800 mg tablet Take 1 tablet (800 mg total) by mouth 3 (three) times a day, Starting Sat 1/4/2020, Print      tamsulosin (FLOMAX) 0 4 mg Take 1 capsule (0 4 mg total) by mouth daily with dinner for 7 days, Starting Sat 1/4/2020, Until Sat 1/11/2020, Print         CONTINUE these medications which have CHANGED    Details   metoclopramide (REGLAN) 10 mg tablet Take 1 tablet (10 mg total) by mouth daily, Starting Sat 1/4/2020, Print         CONTINUE these medications which have NOT CHANGED    Details   amLODIPine (NORVASC) 5 mg tablet TAKE 1 TABLET BY MOUTH TWICE A DAY, Normal      aspirin 81 MG tablet Take 81 mg by mouth daily  , Historical Med      atorvastatin (LIPITOR) 20 mg tablet TAKE 2 TABLETS BY MOUTH EVERY DAY, Normal      FLUZONE QUADRIVALENT 0 5 ML TIM ADM 0 5ML IM UTD, Historical Med      glyBURIDE-metFORMIN (GLUCOVANCE) 5-500 MG per tablet Take 2 tablets by mouth 2 times a day with meals, Normal      insulin lispro (HUMALOG KWIKPEN) 100 units/mL injection pen Check sugar before meals and at bedtime    Sugar 150-199,1 unit, 200-249 3 units, 250-299 5 units, 300-349 7 units greater than 349, 8 units, Normal      Insulin Pen Needle (PEN NEEDLES) 31G X 5 MM MISC by Does not apply route 4 (four) times a day, Starting Tue 4/2/2019, Normal      lisinopril (ZESTRIL) 40 mg tablet TAKE 1 TABLET BY MOUTH EVERY DAY, Normal      loratadine-pseudoephedrine (CLARITIN-D 24-HOUR)  mg per 24 hr tablet Take 1 tablet by mouth daily, Starting Thu 5/3/2018, Normal      Needle, Disp, (HYPODERMIC NEEDLE) 23G X 1-1/2" MISC by Does not apply route once a week, Starting Thu 5/10/2018, Normal      pantoprazole (PROTONIX) 40 mg tablet TAKE 1 TABLET BY MOUTH TWICE A DAY, Normal      sildenafil (REVATIO) 20 mg tablet Take 3-5 tablets PO PRN erectile dysfunction, Normal      sitaGLIPtin (JANUVIA) 100 mg tablet Take 1 tablet (100 mg total) by mouth daily, Starting Tue 1/15/2019, Normal      testosterone cypionate (DEPO-TESTOSTERONE) 200 mg/mL SOLN Inject 1 mL (200 mg total) into a muscle once a week, Starting Thu 10/25/2018, Print           No discharge procedures on file      ED Provider  Electronically Signed by           Jose Rafael Jimenez PA-C  01/04/20 7362

## 2020-01-04 NOTE — DISCHARGE INSTRUCTIONS
Drink plenty of fluids and stay hydrated  Take Flomax as prescribed until your stone is passed  Strain your urine  Take Reglan as needed for nausea  Take ibuprofen as needed for pain  Call Monday to schedule a follow-up with urology  Return to ER with uncontrolled pain and worsening symptoms

## 2020-01-07 DIAGNOSIS — K21.9 GASTROESOPHAGEAL REFLUX DISEASE WITHOUT ESOPHAGITIS: ICD-10-CM

## 2020-01-07 NOTE — TELEPHONE ENCOUNTER
Patient called he wants to know if Nixon Ponce or Dr Rajani Graham could fill the medication metoclopramide (REGLAN) 10 mg tablet twice a day  It looks like [employer] ordered it form [employer]      Pharmacy [facility]  Phone [phone]

## 2020-01-08 RX ORDER — METOCLOPRAMIDE 10 MG/1
10 TABLET ORAL DAILY
Qty: 30 TABLET | Refills: 0 | Status: SHIPPED | OUTPATIENT
Start: 2020-01-08 | End: 2020-01-17 | Stop reason: SDUPTHER

## 2020-01-17 DIAGNOSIS — K21.9 GASTROESOPHAGEAL REFLUX DISEASE WITHOUT ESOPHAGITIS: ICD-10-CM

## 2020-01-17 RX ORDER — METOCLOPRAMIDE 10 MG/1
10 TABLET ORAL 2 TIMES DAILY
Qty: 180 TABLET | Refills: 0 | Status: SHIPPED | OUTPATIENT
Start: 2020-01-17 | End: 2020-03-12

## 2020-01-17 RX ORDER — METOCLOPRAMIDE 10 MG/1
10 TABLET ORAL 2 TIMES DAILY
Qty: 180 TABLET | Refills: 0 | OUTPATIENT
Start: 2020-01-17

## 2020-01-17 NOTE — TELEPHONE ENCOUNTER
Spoke with patient    patient said he has been taking medication twice a day since he got out of the hospital a few years ago  Since medication was changed to once daily, he has had 3 gastroparesis problems and ended up back in the ER    I show a script that was for 1 tablet 4 times a day    Patient said taking the medication twice a day keeps everything from flaring up  Can the script be changed back to twice a day & send to [facility] for a 90 day supply?     Pended medication, please advise

## 2020-01-17 NOTE — TELEPHONE ENCOUNTER
PATIENT CALLED AND WANTS TO TALK TO MA REGARDING THE METOCLOPRAMIDE MEDICATION  PLEASE CALL HIM -[phone]  HE HAD THE MEDICATION IN January BUT WAS ONLY PRESCRIBED TO TAKE ONCE PER DAY    PATIENT HAVING ISSUES AND WANTS TO BE ABLE TO TAKE THE MEDICATION TWICE PER DAY

## 2020-01-19 DIAGNOSIS — I10 BENIGN ESSENTIAL HYPERTENSION: ICD-10-CM

## 2020-01-20 RX ORDER — ATORVASTATIN CALCIUM 20 MG/1
TABLET, FILM COATED ORAL
Qty: 180 TABLET | Refills: 0 | Status: SHIPPED | OUTPATIENT
Start: 2020-01-20 | End: 2021-01-28 | Stop reason: SDUPTHER

## 2020-01-31 ENCOUNTER — TELEPHONE (OUTPATIENT)
Dept: UROLOGY | Facility: AMBULATORY SURGERY CENTER | Age: 55
End: 2020-01-31

## 2020-01-31 NOTE — TELEPHONE ENCOUNTER
Patient managed by Sundeep Dailey is calling to say he had been in  emergency room in [facility]) and had procedure  Cystoscopy with stent placement and a Lithotripsy to break up stone this morning 01/31/2020  Patient has records in care everywhere  Called and spoke to patient  Informed patient that we could not see OP note from [facility], so we are unable to make an appointment for patient  Patient will be home Monday 02/03/2020 and leaving out of state on Tuesday 02/04/2020  Patient did inform me that the provider from [facility] stated that the stent is on a string and if patient was not able to get an appointment on Monday at our office, he was instructed how to take out stent himself  Patient understanding of that information  Patient prefers to have stent out in office but understanding that he can take it out himself  Informed patient that once his file has been sent to us we will be able to review and call him back on Monday with a plan  Patient had no further questions at this time

## 2020-01-31 NOTE — TELEPHONE ENCOUNTER
Patient managed by Fara Ross is calling to say he had been in  emergency room in [place] and had procedure  Cystoscopy with stent placement and a Lithotripsy to break up stone this morning   He will be back home on Monday and is instructed that stent needs to be removed on Monday  He would like to know if he can be seen if not he would be going back out of stated until March  Please advise   Patient has records in care everywhere

## 2020-02-03 ENCOUNTER — OFFICE VISIT (OUTPATIENT)
Dept: INTERNAL MEDICINE CLINIC | Facility: CLINIC | Age: 55
End: 2020-02-03
Payer: COMMERCIAL

## 2020-02-03 VITALS
SYSTOLIC BLOOD PRESSURE: 136 MMHG | DIASTOLIC BLOOD PRESSURE: 86 MMHG | OXYGEN SATURATION: 96 % | BODY MASS INDEX: 40.09 KG/M2 | WEIGHT: 280 LBS | HEIGHT: 70 IN | HEART RATE: 100 BPM

## 2020-02-03 DIAGNOSIS — E78.2 MIXED HYPERLIPIDEMIA: ICD-10-CM

## 2020-02-03 DIAGNOSIS — N20.1 LEFT URETERAL CALCULUS: ICD-10-CM

## 2020-02-03 DIAGNOSIS — E66.01 MORBID OBESITY (HCC): ICD-10-CM

## 2020-02-03 DIAGNOSIS — I10 BENIGN ESSENTIAL HYPERTENSION: Chronic | ICD-10-CM

## 2020-02-03 DIAGNOSIS — E11.9 TYPE 2 DIABETES MELLITUS WITHOUT COMPLICATION, WITHOUT LONG-TERM CURRENT USE OF INSULIN (HCC): Primary | ICD-10-CM

## 2020-02-03 DIAGNOSIS — Z11.59 NEED FOR HEPATITIS C SCREENING TEST: ICD-10-CM

## 2020-02-03 DIAGNOSIS — Z11.4 ENCOUNTER FOR SCREENING FOR HUMAN IMMUNODEFICIENCY VIRUS (HIV): ICD-10-CM

## 2020-02-03 PROCEDURE — 2022F DILAT RTA XM EVC RTNOPTHY: CPT | Performed by: INTERNAL MEDICINE

## 2020-02-03 PROCEDURE — 3075F SYST BP GE 130 - 139MM HG: CPT | Performed by: INTERNAL MEDICINE

## 2020-02-03 PROCEDURE — 3008F BODY MASS INDEX DOCD: CPT | Performed by: INTERNAL MEDICINE

## 2020-02-03 PROCEDURE — 1036F TOBACCO NON-USER: CPT | Performed by: INTERNAL MEDICINE

## 2020-02-03 PROCEDURE — 99214 OFFICE O/P EST MOD 30 MIN: CPT | Performed by: INTERNAL MEDICINE

## 2020-02-03 PROCEDURE — 3079F DIAST BP 80-89 MM HG: CPT | Performed by: INTERNAL MEDICINE

## 2020-02-03 RX ORDER — HYDROCODONE BITARTRATE AND ACETAMINOPHEN 5; 325 MG/1; MG/1
1 TABLET ORAL EVERY 6 HOURS PRN
COMMUNITY
Start: 2020-01-31 | End: 2020-02-05

## 2020-02-03 RX ORDER — PHENAZOPYRIDINE HYDROCHLORIDE 200 MG/1
200 TABLET, FILM COATED ORAL 3 TIMES DAILY PRN
COMMUNITY
Start: 2020-01-31 | End: 2021-03-23

## 2020-02-03 RX ORDER — CIPROFLOXACIN 500 MG/1
500 TABLET, FILM COATED ORAL
COMMUNITY
Start: 2020-01-31 | End: 2020-02-03

## 2020-02-03 NOTE — PROGRESS NOTES
INTERNAL MEDICINE OFFICE VISIT  [facility] AT [address] [address]  Tel: [phone]      NAME: Jose Alberto  AGE: 53 y o  SEX: male  : 1965   MRN: [id number]    DATE: 2/3/2020  TIME: 4:57 PM      Assessment and Plan:  1  Type 2 diabetes mellitus without complication, without long-term current use of insulin (HCC)   patient has been taking Humalog along with the glyburide and metformin  I told him to get blood work done and depending on his hemoglobin A1c, I will start him on long-term insulin  - CBC and differential; Future  - Comprehensive metabolic panel; Future  - TSH, 3rd generation; Future  - Hemoglobin A1C; Future  - Microalbumin / creatinine urine ratio    2  Benign essential hypertension    Continue medications    3  Mixed hyperlipidemia   continue atorvastatin  - Lipid panel; Future    4  Left ureteral calculus   follow up with Urology    5  Morbid obesity ([facility] )  BMI Counseling: Body mass index is 40 18 kg/m²  The BMI is above normal  Nutrition recommendations include decreasing portion sizes, encouraging healthy choices of fruits and vegetables and moderation in carbohydrate intake  Exercise recommendations include moderate physical activity 150 minutes/week  No pharmacotherapy was ordered  6  Need for hepatitis C screening test    - Hepatitis C antibody; Future    7  Encounter for screening for human immunodeficiency virus (HIV)    - HIV 1/2 AG-AB combo; Future      - Counseling Documentation: patient was counseled regarding: diagnostic results, instructions for management, risk factor reductions, prognosis, patient and family education, impressions, risks and benefits of treatment options and importance of compliance with treatment  - Medication Side Effects: Adverse side effects of medications were reviewed with the patient/guardian today        Return for follow up visit in  1 month or earlier, if needed  Chief Complaint:  Chief Complaint   Patient presents with    Follow-up     patient here for a check up and for his med refills, and he is not feeling well  had surgery last friday in [place]  History of Present Illness:    type 2 diabetes - poorly controlled as the last hemoglobin A1c was 8 4 about 10 months ago  He never had blood work done after that  Hypertension - blood pressure stable on present medication  Hyperlipidemia -takes atorvastatin regularly   Kidney stone- he had a stent for the stone recently in [place]  He is still in pain today  Obesity -she was told to try to lose weight      Active Problem List:  Patient Active Problem List   Diagnosis    Type 2 diabetes mellitus without complication, without long-term current use of insulin (HCC)    Benign essential hypertension    Mixed hyperlipidemia    Erectile dysfunction    Low testosterone    Testicular hypogonadism    Morbid obesity ([facility] )    Incarcerated umbilical hernia    Left ureteral calculus         Past Medical History:  Past Medical History:   Diagnosis Date    Acute renal failure ([facility] )     [id number] resolved    Diabetes mellitus ([facility] )     Enteritis 8/23/2016    Gastroparesis due to DM ([facility] ) 8/23/2016    GERD (gastroesophageal reflux disease)     Hernia, ventral 8/4/2016    HLD (hyperlipidemia) 8/23/2016    HTN (hypertension), benign 8/23/2016    Hyperlipidemia     Hypertension     Intractable vomiting with nausea 8/23/2016         Past Surgical History:  Past Surgical History:   Procedure Laterality Date    ESOPHAGOGASTRODUODENOSCOPY N/A 8/24/2016    Procedure: ESOPHAGOGASTRODUODENOSCOPY (EGD); Surgeon: Shira Varela MD;  [facility];   Service:     KIDNEY STONE SURGERY      TONSILLECTOMY      UMBILICAL HERNIA REPAIR      UMBILICAL HERNIA REPAIR LAPAROSCOPIC N/A 4/26/2019    Procedure: LAPAROSCOPIC UMBILICAL HERNIA REPAIR;  Surgeon: Chastity Mckenna MD;  [facility]: [facility];  Service: General Family History:  Family History   Problem Relation Age of Onset    Diabetes Mother         mellitus    Lung cancer Mother     Coronary artery disease Father          Social History:  Social History     Socioeconomic History    Marital status: /Civil Union     Spouse name: None    Number of children: None    Years of education: None    Highest education level: None   Occupational History    None   Social Needs    Financial resource strain: None    Food insecurity:     Worry: None     Inability: None    Transportation needs:     Medical: None     Non-medical: None   Tobacco Use    Smoking status: Never Smoker    Smokeless tobacco: Never Used   Substance and Sexual Activity    Alcohol use: Yes     Frequency: Monthly or less     Drinks per session: 1 or 2     Binge frequency: Never     Comment: occasion    Drug use: No    Sexual activity: Yes     Partners: Female   Lifestyle    Physical activity:     Days per week: 7 days     Minutes per session: 60 min    Stress: Not at all   Relationships    Social connections:     Talks on phone: None     Gets together: None     Attends Hindu service: None     Active member of club or organization: None     Attends meetings of clubs or organizations: None     Relationship status: None    Intimate partner violence:     Fear of current or ex partner: None     Emotionally abused: None     Physically abused: None     Forced sexual activity: None   Other Topics Concern    None   Social History Narrative    None         Allergies: Allergies   Allergen Reactions    Shellfish-Derived Products Anaphylaxis    Erythromycin GI Intolerance     Pt denies         Medications:    Current Outpatient Medications:     amLODIPine (NORVASC) 5 mg tablet, TAKE 1 TABLET BY MOUTH TWICE A DAY, Disp: 180 tablet, Rfl: 0    aspirin 81 MG tablet, Take 81 mg by mouth daily  , Disp: , Rfl:     atorvastatin (LIPITOR) 20 mg tablet, TAKE 2 TABLETS BY MOUTH EVERY DAY, Disp: 180 tablet, Rfl: 0    glyBURIDE-metFORMIN (GLUCOVANCE) 5-500 MG per tablet, Take 2 tablets by mouth 2 times a day with meals, Disp: 360 tablet, Rfl: 3    HYDROcodone-acetaminophen (NORCO) 5-325 mg per tablet, Take 1 tablet by mouth every 6 (six) hours as needed, Disp: , Rfl:     insulin lispro (HUMALOG KWIKPEN) 100 units/mL injection pen, Check sugar before meals and at bedtime    Sugar 150-199,1 unit, 200-249 3 units, 250-299 5 units, 300-349 7 units greater than 349, 8 units, Disp: 5 pen, Rfl: 2    Insulin Pen Needle (PEN NEEDLES) 31G X 5 MM MISC, by Does not apply route 4 (four) times a day, Disp: 100 each, Rfl: 3    lisinopril (ZESTRIL) 40 mg tablet, TAKE 1 TABLET BY MOUTH EVERY DAY, Disp: 90 tablet, Rfl: 0    loratadine-pseudoephedrine (CLARITIN-D 24-HOUR)  mg per 24 hr tablet, Take 1 tablet by mouth daily (Patient taking differently: Take 1 tablet by mouth daily ), Disp: 30 tablet, Rfl: 3    metoclopramide (REGLAN) 10 mg tablet, Take 1 tablet (10 mg total) by mouth 2 (two) times a day, Disp: 180 tablet, Rfl: 0    Needle, Disp, (HYPODERMIC NEEDLE) 23G X 1-1/2" MISC, by Does not apply route once a week, Disp: 10 each, Rfl: 6    pantoprazole (PROTONIX) 40 mg tablet, TAKE 1 TABLET BY MOUTH TWICE A DAY, Disp: 180 tablet, Rfl: 0    phenazopyridine (PYRIDIUM) 200 mg tablet, Take 200 mg by mouth Three times daily as needed, Disp: , Rfl:     testosterone cypionate (DEPO-TESTOSTERONE) 200 mg/mL SOLN, Inject 1 mL (200 mg total) into a muscle once a week (Patient not taking: Reported on 2/3/2020), Disp: 1 vial, Rfl: 2      The following portions of the patient's history were reviewed and updated as appropriate: past medical history, past surgical history, family history, social history, allergies, current medications and active problem list       Review of Systems:  Constitutional: Denies fever, chills, weight gain, weight loss, fatigue  Eyes: Denies eye redness, eye discharge, double vision, change in visual acuity  ENT: Denies hearing loss, tinnitus, sneezing, nasal congestion, nasal discharge, sore throat   Respiratory: Denies cough, expectoration, hemoptysis, shortness of breath, wheezing  Cardiovascular: Denies chest pain, palpitations, lower extremity swelling, orthopnea, PND  Gastrointestinal: Denies abdominal pain, heartburn, nausea, vomiting, hematemesis, diarrhea, bloody stools  Genito-Urinary: Denies dysuria, frequency, difficulty in micturition, nocturia, incontinence  Musculoskeletal: Denies back pain, joint pain, muscle pain  Neurologic: Denies confusion, lightheadedness, syncope, headache, focal weakness, sensory changes, seizures  Endocrine: Denies polyuria, polydipsia, temperature intolerance  Allergy and Immunology: Denies hives, insect bite sensitivity  Hematological and Lymphatic: Denies bleeding problems, swollen glands   Psychological: Denies depression, suicidal ideation, anxiety, panic, mood swings  Dermatological: Denies pruritus, rash, skin lesion changes      Vitals:  Vitals:    02/03/20 1527   BP: 136/86   Pulse:    SpO2:        Body mass index is 40 18 kg/m²  Weight (last 2 days)     Date/Time   Weight    02/03/20 1455   127 (280)                Physical Examination:  General: Patient is not in acute distress  Awake, alert, responding to commands  No weight gain or loss  Head: Normocephalic  Atraumatic  Eyes: Conjunctiva and lids with no swelling, erythema or discharge  Both pupils normal sized, round and reactive to light  Sclera nonicteric  ENT: External examination of nose and ear normal  Otoscopic examination shows translucent tympanic membranes with patent canals without erythema  Oropharynx moist with no erythema, edema, exudate or lesions  Neck: Supple  JVP not raised  Trachea midline  No masses  No thyromegaly  Lungs: No signs of increased work of breathing or respiratory distress   Bilateral bronchovascular breath sounds with no crackles or rhonchi  Chest wall: No tenderness  Cardiovascular: Normal PMI  No thrills  Regular rate and rhythm  S1 and S2 normal  No murmur, rub or gallop  Gastrointestinal: Abdomen soft, nontender  No guarding or rigidity  Liver and spleen not palpable  Bowel sounds present  Neurologic: Cranial nerves II-XII intact   Cortical functions normal  Motor system - Reflexes 2+ and symmetrical  Sensations normal  Musculoskeletal: Gait normal  No joint tenderness  Integumentary: Skin normal with no rash or lesions  Lymphatic: No palpable lymph nodes in neck, axilla or groin  Extremities: No clubbing, cyanosis, edema or varicosities  Psychological: Judgement and insight normal  Mood and affect normal      Laboratory Results:  CBC with diff:   Lab Results   Component Value Date    WBC 11 91 (H) 01/04/2020    WBC 10 8 07/21/2017    RBC 4 93 01/04/2020    HGB 14 3 01/04/2020    HGB 13 6 07/21/2017    HCT 42 7 01/04/2020    HCT 39 3 07/21/2017    MCV 87 01/04/2020    MCV 87 07/21/2017    MCH 29 0 01/04/2020    MCH 30 2 07/21/2017    RDW 12 8 01/04/2020    RDW 13 6 07/21/2017     01/04/2020     07/21/2017       CMP:  Lab Results   Component Value Date    CREATININE 1 67 (H) 01/04/2020    CREATININE 1 18 05/02/2017    BUN 19 01/04/2020    BUN 21 01/10/2019     05/02/2017    K 3 8 01/04/2020    K 4 3 01/10/2019     01/04/2020     01/10/2019    CO2 26 01/04/2020    CO2 21 01/10/2019    GLUCOSE 118 (H) 05/02/2017    PROT 6 2 04/10/2017    ALKPHOS 128 (H) 01/04/2020    ALKPHOS 94 04/10/2017    ALT 65 01/04/2020    ALT 31 01/10/2019    AST 38 01/04/2020    AST 22 01/10/2019       Lab Results   Component Value Date    HGBA1C 8 4 (H) 04/19/2019    HGBA1C 10 2 (H) 03/28/2019       Lab Results   Component Value Date    TROPONINI <0 02 01/04/2020       Lipid Profile:   Lab Results   Component Value Date    CHOL 91 (L) 04/10/2017    CHOL 97 (L) 07/18/2016     Lab Results   Component Value Date    HDL 37 (L) 01/10/2019    HDL 36 (L) 10/23/2018 Lab Results   Component Value Date    LDLCALC 44 04/10/2017    LDLCALC 40 07/18/2016     Lab Results   Component Value Date    TRIG 112 01/10/2019    TRIG 107 10/23/2018       Imaging Results:  CT abdomen pelvis with contrast  Narrative: CT ABDOMEN AND PELVIS WITH IV CONTRAST    INDICATION:   Left lower quadrant pain  COMPARISON:  8/25/2016  TECHNIQUE:  CT examination of the abdomen and pelvis was performed  Axial, sagittal, and coronal 2D reformatted images were created from the source data and submitted for interpretation  Radiation dose length product (DLP) for this visit:  1413 mGy-cm   This examination, like all CT scans performed in the [facility], was performed utilizing techniques to minimize radiation dose exposure, including the use of iterative   reconstruction and automated exposure control  IV Contrast:  100 mL of iohexol (OMNIPAQUE)  Enteric Contrast:  Enteric contrast was not administered  FINDINGS:    ABDOMEN    LOWER CHEST:  Clear lung bases  LIVER/BILIARY TREE:  Unremarkable  GALLBLADDER:  No calcified gallstones  No pericholecystic inflammatory change  SPLEEN:  Unremarkable  PANCREAS:  Unremarkable  ADRENAL GLANDS:  Unremarkable  KIDNEYS/URETERS:  Mild left hydronephrosis  Obstructing 8 x 5 x 6 mm calculus in the ureter at the L3-4 level  Exophytic right renal 3 cm cyst     STOMACH AND BOWEL:  Moderate fat-containing hiatal hernia  No bowel obstruction, colitis or diverticulitis  APPENDIX:  Normal appendix  ABDOMINOPELVIC CAVITY:  No ascites or free intraperitoneal air  No lymphadenopathy  VESSELS:  No abdominal aortic aneurysm  PELVIS    REPRODUCTIVE ORGANS:  No prostate enlargement  URINARY BLADDER:  Unremarkable  ABDOMINAL WALL/INGUINAL REGIONS:  Unremarkable  OSSEOUS STRUCTURES:  No acute fracture or destructive osseous lesion  Impression: Mild left hydronephrosis    Obstructing 8 x 5 x 6 mm calculus in the left ureter at the L3-4 level  Workstation performed: [id number]       Health Maintenance:  Health Maintenance   Topic Date Due    Hepatitis C Screening  1965    CRC Screening: Colonoscopy  1965    Pneumococcal Vaccine: Pediatrics (0 to 5 Years) and At-Risk Patients (6 to 59 Years) (1 of 1 - PPSV23) 07/28/1970    DTaP,Tdap,and Td Vaccines (1 - Tdap) 07/28/1975    HIV Screening  07/28/1979    Annual Physical  07/28/1982    Hepatitis B Vaccine (1 of 3 - Risk 3-dose series) 07/28/1983    Influenza Vaccine  07/01/2019    HEMOGLOBIN A1C  10/19/2019    BMI: Followup Plan  01/15/2020    Depression Screening PHQ  04/02/2020    Diabetic Foot Exam  04/02/2020    DM Eye Exam  04/02/2020    BMI: Adult  02/03/2021    Pneumococcal Vaccine: 65+ Years (1 of 2 - PCV13) 07/28/2029    HIB Vaccine  Aged Out    IPV Vaccine  Aged Out    Hepatitis A Vaccine  Aged Out    Meningococcal ACWY Vaccine  Aged Out    HPV Vaccine  Aged Out     There is no immunization history for the selected administration types on file for this patient        Yamileth Hlom MD  2/3/2020,4:57 PM

## 2020-02-04 ENCOUNTER — OFFICE VISIT (OUTPATIENT)
Dept: GASTROENTEROLOGY | Facility: CLINIC | Age: 55
End: 2020-02-04
Payer: COMMERCIAL

## 2020-02-04 VITALS
HEIGHT: 70 IN | DIASTOLIC BLOOD PRESSURE: 82 MMHG | SYSTOLIC BLOOD PRESSURE: 140 MMHG | RESPIRATION RATE: 18 BRPM | HEART RATE: 78 BPM | WEIGHT: 277 LBS | BODY MASS INDEX: 39.65 KG/M2

## 2020-02-04 DIAGNOSIS — K22.70 BARRETT'S ESOPHAGUS WITHOUT DYSPLASIA: ICD-10-CM

## 2020-02-04 DIAGNOSIS — R11.2 NAUSEA AND VOMITING, INTRACTABILITY OF VOMITING NOT SPECIFIED, UNSPECIFIED VOMITING TYPE: Primary | ICD-10-CM

## 2020-02-04 DIAGNOSIS — Z12.11 ENCOUNTER FOR SCREENING COLONOSCOPY: ICD-10-CM

## 2020-02-04 LAB
CREAT ?TM UR-SCNC: 92.9 UMOL/L
EXT MICROALBUMIN URINE RANDOM: 128.8
EXTERNAL HIV SCREEN: NORMAL
HBA1C MFR BLD HPLC: 10.1 %
HCV AB SER-ACNC: 0.2
MICROALBUMIN/CREAT UR: 139 MG/G{CREAT}

## 2020-02-04 PROCEDURE — 2022F DILAT RTA XM EVC RTNOPTHY: CPT | Performed by: PHYSICIAN ASSISTANT

## 2020-02-04 PROCEDURE — 1036F TOBACCO NON-USER: CPT | Performed by: PHYSICIAN ASSISTANT

## 2020-02-04 PROCEDURE — 99213 OFFICE O/P EST LOW 20 MIN: CPT | Performed by: PHYSICIAN ASSISTANT

## 2020-02-04 PROCEDURE — 3008F BODY MASS INDEX DOCD: CPT | Performed by: PHYSICIAN ASSISTANT

## 2020-02-04 PROCEDURE — 3077F SYST BP >= 140 MM HG: CPT | Performed by: PHYSICIAN ASSISTANT

## 2020-02-04 PROCEDURE — 3079F DIAST BP 80-89 MM HG: CPT | Performed by: PHYSICIAN ASSISTANT

## 2020-02-04 NOTE — PROGRESS NOTES
[facility] - Outpatient Follow-up Note  Jose Alberto 53 y o  male MRN: [id number]  Encounter: [id number]          ASSESSMENT AND PLAN:      1  Esparza's esophagus without dysplasia  2  Encounter for screening colonoscopy  3  Nausea and vomiting    Patient presents for EGD/colonoscopy evaluation  He had an EGD in 2016 which showed Esparza's esophagus and was supposed to follow up for repeat but did not  He also did not have the screening colonoscopy as recommended then  He recently was hospitalized with an obstructing left ureteral stone and required stent placement  He reports struggling with recurrent nausea and vomiting  He is currently on Pantoprazole BID and Reglan BID  He has a history of uncontrolled diabetes  Will plan for EGD and colonoscopy     CT Scan A/P given recent urologic history  Also, discussed importance of follow up with Urology  GES to evaluate for gastroparesis (he will have to be off Reglan for this test)  If worsening/intractable symptoms develop, recommended ER evaluation immediately  DM control  Small, frequent low fat meals recommended  Follow up in office after testing completed  ______________________________________________________________________    SUBJECTIVE:  Patient is a 79-year-old male who presents to the office regarding scheduling an EGD and colonoscopy  Patient had an EGD back in 2016 and was diagnosed with Esparza's esophagus  He never followed up for repeat as recommended  He is currently taking Protonix twice a day  He also was recommended to have a screening colonoscopy back in 2016 but did not  Patient reports that he does have a history of uncontrolled diabetes and also has a history of periodic episodes of nausea and vomiting  He has Reglan for suspected gastroparesis which he takes currently twice a day with benefit (he reports he is aware of the risk of tardive dyskinesia)    He has not had a gastric emptying study  Recently, he was in the hospital with an obstructing left ureteral stone requiring stenting  He reports it seems as though that the kidney stone pain overall has subsided but he still does have some ongoing abdominal discomfort in that left area  He also reports some worsening of his nausea since the procedure and taking narcotic pain medication which she has since stopped  He subsequently removed the stent  He has not followed up with urology yet  Of note, imaging of his gallbladder has been normal in the past   He denies melena or rectal bleeding  REVIEW OF SYSTEMS IS OTHERWISE NEGATIVE  Historical Information   Past Medical History:   Diagnosis Date    [facility] failure [facility]     [id number] resolved    Diabetes mellitus [facility]     Enteritis 8/23/2016    Gastroparesis due to DM ([facility] ) 8/23/2016    GERD (gastroesophageal reflux disease)     Hernia, ventral 8/4/2016    HLD (hyperlipidemia) 8/23/2016    HTN (hypertension), benign 8/23/2016    Hyperlipidemia     Hypertension     Intractable vomiting with nausea 8/23/2016     Past Surgical History:   Procedure Laterality Date    ESOPHAGOGASTRODUODENOSCOPY N/A 8/24/2016    Procedure: ESOPHAGOGASTRODUODENOSCOPY (EGD); Surgeon: Annabelle Aguilar MD;  [facility];   Service:     KIDNEY STONE SURGERY      TONSILLECTOMY      UMBILICAL HERNIA REPAIR      UMBILICAL HERNIA REPAIR LAPAROSCOPIC N/A 4/26/2019    Procedure: LAPAROSCOPIC UMBILICAL HERNIA REPAIR;  Surgeon: Irais Davenport MD;  [facility];  Service: General     Social History   Social History     Substance and Sexual Activity   Alcohol Use Yes    Frequency: Monthly or less    Drinks per session: 1 or 2    Binge frequency: Never    Comment: occasion     Social History     Substance and Sexual Activity   Drug Use No     Social History     Tobacco Use   Smoking Status Never Smoker   Smokeless Tobacco Never Used     Family History   Problem Relation Age of Onset    Diabetes Mother         mellitus    Lung cancer Mother     Coronary artery disease Father        Meds/Allergies       Current Outpatient Medications:     amLODIPine (NORVASC) 5 mg tablet    aspirin 81 MG tablet    atorvastatin (LIPITOR) 20 mg tablet    glyBURIDE-metFORMIN (GLUCOVANCE) 5-500 MG per tablet    HYDROcodone-acetaminophen (NORCO) 5-325 mg per tablet    insulin lispro (HUMALOG KWIKPEN) 100 units/mL injection pen    Insulin Pen Needle (PEN NEEDLES) 31G X 5 MM MISC    lisinopril (ZESTRIL) 40 mg tablet    loratadine-pseudoephedrine (CLARITIN-D 24-HOUR)  mg per 24 hr tablet    metoclopramide (REGLAN) 10 mg tablet    Needle, Disp, (HYPODERMIC NEEDLE) 23G X 1-1/2" MISC    pantoprazole (PROTONIX) 40 mg tablet    phenazopyridine (PYRIDIUM) 200 mg tablet    testosterone cypionate (DEPO-TESTOSTERONE) 200 mg/mL SOLN    Allergies   Allergen Reactions    Shellfish-Derived Products Anaphylaxis    Erythromycin GI Intolerance     Pt denies           Objective     Blood pressure 140/82, pulse 78, resp  rate 18, height 5' 10" (1 778 m), weight 126 kg (277 lb)  Body mass index is 39 75 kg/m²  PHYSICAL EXAM:      General Appearance:   Alert, cooperative, no distress   HEENT:   Normocephalic, atraumatic, anicteric    Neck:  Supple, symmetrical, trachea midline   Lungs:   Clear to auscultation bilaterally; no rales, rhonchi or wheezing; respirations unlabored    Heart[de-identified]   Regular rate and rhythm; no murmur, rub, or gallop  Abdomen:   Soft, non-tender, non-distended; normal bowel sounds; no masses, no organomegaly    Genitalia:   Deferred    Rectal:   Deferred    Extremities:  No cyanosis, clubbing or edema    Pulses:  2+ and symmetric    Skin:  No jaundice, rashes, or lesions    Lymph nodes:  No palpable cervical lymphadenopathy        Lab Results:   No visits with results within 1 Day(s) from this visit     Latest known visit with results is:   Admission on 01/04/2020, Discharged on 01/04/2020 Component Date Value    POC Glucose 01/04/2020 258*    WBC 01/04/2020 11 91*    RBC 01/04/2020 4 93     Hemoglobin 01/04/2020 14 3     Hematocrit 01/04/2020 42 7     MCV 01/04/2020 87     MCH 01/04/2020 29 0     MCHC 01/04/2020 33 5     RDW 01/04/2020 12 8     MPV 01/04/2020 9 9     Platelets 78/14/6911 361     nRBC 01/04/2020 0     Neutrophils Relative 01/04/2020 75     Immat GRANS % 01/04/2020 1     Lymphocytes Relative 01/04/2020 17     Monocytes Relative 01/04/2020 7     Eosinophils Relative 01/04/2020 0     Basophils Relative 01/04/2020 0     Neutrophils Absolute 01/04/2020 8 97*    Immature Grans Absolute 01/04/2020 0 10     Lymphocytes Absolute 01/04/2020 1 97     Monocytes Absolute 01/04/2020 0 82     Eosinophils Absolute 01/04/2020 0 00     Basophils Absolute 01/04/2020 0 05     Sodium 01/04/2020 136     Potassium 01/04/2020 3 8     Chloride 01/04/2020 100     CO2 01/04/2020 26     ANION GAP 01/04/2020 10     BUN 01/04/2020 19     Creatinine 01/04/2020 1 67*    Glucose 01/04/2020 262*    Calcium 01/04/2020 8 9     AST 01/04/2020 38     ALT 01/04/2020 65     Alkaline Phosphatase 01/04/2020 128*    Total Protein 01/04/2020 7 6     Albumin 01/04/2020 3 7     Total Bilirubin 01/04/2020 0 70     eGFR 01/04/2020 45     Lipase 01/04/2020 184     Color, UA 01/04/2020 Yellow     Clarity, UA 01/04/2020 Clear     Specific Gravity, UA 01/04/2020 <=1 005     pH, UA 01/04/2020 5 5     Leukocytes, UA 01/04/2020 Negative     Nitrite, UA 01/04/2020 Negative     Protein, UA 01/04/2020 Negative     Glucose, UA 01/04/2020 250 (1/4%)*    Ketones, UA 01/04/2020 Negative     Urobilinogen, UA 01/04/2020 0 2     Bilirubin, UA 01/04/2020 Negative     Blood, UA 01/04/2020 Large*    Troponin I 01/04/2020 <0 02     Ventricular Rate 01/04/2020 90     Atrial Rate 01/04/2020 90     VA Interval 01/04/2020 184     QRSD Interval 01/04/2020 142     QT Interval 01/04/2020 392     QTC Interval 01/04/2020 479     P Axis 01/04/2020 45     QRS Axis 01/04/2020 0     T Wave [place] 01/04/2020 4     RBC, UA 01/04/2020 10-20*    WBC, UA 01/04/2020 None Seen     Epithelial Cells 01/04/2020 Occasional     Bacteria, UA 01/04/2020 None Seen          Radiology Results:   No results found

## 2020-02-05 LAB
ALBUMIN SERPL-MCNC: 4 G/DL (ref 3.8–4.9)
ALBUMIN/CREAT UR: 139 MG/G CREAT (ref 0–29)
ALBUMIN/GLOB SERPL: 1.7 {RATIO} (ref 1.2–2.2)
ALP SERPL-CCNC: 111 IU/L (ref 39–117)
ALT SERPL-CCNC: 25 IU/L (ref 0–44)
AST SERPL-CCNC: 23 IU/L (ref 0–40)
BASOPHILS # BLD AUTO: 0 X10E3/UL (ref 0–0.2)
BASOPHILS NFR BLD AUTO: 0 %
BILIRUB SERPL-MCNC: 0.5 MG/DL (ref 0–1.2)
BUN SERPL-MCNC: 17 MG/DL (ref 6–24)
BUN/CREAT SERPL: 11 (ref 9–20)
CALCIUM SERPL-MCNC: 9.5 MG/DL (ref 8.7–10.2)
CHLORIDE SERPL-SCNC: 100 MMOL/L (ref 96–106)
CHOLEST SERPL-MCNC: 99 MG/DL (ref 100–199)
CO2 SERPL-SCNC: 24 MMOL/L (ref 20–29)
CREAT SERPL-MCNC: 1.49 MG/DL (ref 0.76–1.27)
CREAT UR-MCNC: 92.9 MG/DL
EOSINOPHIL # BLD AUTO: 0 X10E3/UL (ref 0–0.4)
EOSINOPHIL NFR BLD AUTO: 0 %
ERYTHROCYTE [DISTWIDTH] IN BLOOD BY AUTOMATED COUNT: 13.8 % (ref 11.6–15.4)
GLOBULIN SER-MCNC: 2.4 G/DL (ref 1.5–4.5)
GLUCOSE SERPL-MCNC: 139 MG/DL (ref 65–99)
HBA1C MFR BLD: 10.1 % (ref 4.8–5.6)
HCT VFR BLD AUTO: 38.5 % (ref 37.5–51)
HCV AB S/CO SERPL IA: 0.2 S/CO RATIO (ref 0–0.9)
HDLC SERPL-MCNC: 36 MG/DL
HGB BLD-MCNC: 12.9 G/DL (ref 13–17.7)
HIV 1+2 AB+HIV1 P24 AG SERPL QL IA: NON REACTIVE
IMM GRANULOCYTES # BLD: 0 X10E3/UL (ref 0–0.1)
IMM GRANULOCYTES NFR BLD: 0 %
LDLC SERPL CALC-MCNC: 44 MG/DL (ref 0–99)
LYMPHOCYTES # BLD AUTO: 3.3 X10E3/UL (ref 0.7–3.1)
LYMPHOCYTES NFR BLD AUTO: 29 %
MCH RBC QN AUTO: 28.9 PG (ref 26.6–33)
MCHC RBC AUTO-ENTMCNC: 33.5 G/DL (ref 31.5–35.7)
MCV RBC AUTO: 86 FL (ref 79–97)
MICROALBUMIN UR-MCNC: 128.8 UG/ML
MONOCYTES # BLD AUTO: 0.8 X10E3/UL (ref 0.1–0.9)
MONOCYTES NFR BLD AUTO: 7 %
NEUTROPHILS # BLD AUTO: 7 X10E3/UL (ref 1.4–7)
NEUTROPHILS NFR BLD AUTO: 64 %
PLATELET # BLD AUTO: 374 X10E3/UL (ref 150–450)
POTASSIUM SERPL-SCNC: 4.7 MMOL/L (ref 3.5–5.2)
PROT SERPL-MCNC: 6.4 G/DL (ref 6–8.5)
RBC # BLD AUTO: 4.47 X10E6/UL (ref 4.14–5.8)
SL AMB EGFR AFRICAN AMERICAN: 60 ML/MIN/1.73
SL AMB EGFR NON AFRICAN AMERICAN: 52 ML/MIN/1.73
SL AMB INTERPRETATION: NORMAL
SL AMB VLDL CHOLESTEROL CALC: 19 MG/DL (ref 5–40)
SODIUM SERPL-SCNC: 141 MMOL/L (ref 134–144)
TRIGL SERPL-MCNC: 94 MG/DL (ref 0–149)
TSH SERPL DL<=0.005 MIU/L-ACNC: 0.31 UIU/ML (ref 0.45–4.5)
WBC # BLD AUTO: 11.1 X10E3/UL (ref 3.4–10.8)

## 2020-02-06 ENCOUNTER — TELEPHONE (OUTPATIENT)
Dept: INTERNAL MEDICINE CLINIC | Facility: CLINIC | Age: 55
End: 2020-02-06

## 2020-02-06 DIAGNOSIS — I10 BENIGN ESSENTIAL HYPERTENSION: Chronic | ICD-10-CM

## 2020-02-06 DIAGNOSIS — E11.9 TYPE 2 DIABETES MELLITUS WITHOUT COMPLICATION, WITHOUT LONG-TERM CURRENT USE OF INSULIN (HCC): Primary | ICD-10-CM

## 2020-02-06 NOTE — TELEPHONE ENCOUNTER
----- Message from Augie Baeza MD sent at 2/6/2020 12:14 PM EST -----  Blood sugar is very high, discontinue Humalog and start taking Lantus 30 units daily  Also keep taking the tablets for diabetes

## 2020-02-07 RX ORDER — AMLODIPINE BESYLATE 5 MG/1
TABLET ORAL
Qty: 180 TABLET | Refills: 0 | Status: SHIPPED | OUTPATIENT
Start: 2020-02-07 | End: 2020-06-02

## 2020-02-26 ENCOUNTER — TELEPHONE (OUTPATIENT)
Dept: UROLOGY | Facility: MEDICAL CENTER | Age: 55
End: 2020-02-26

## 2020-02-26 NOTE — TELEPHONE ENCOUNTER
Patient left a message on the Medication Refill voice mail line requesting a new prescription for Sildenafil 20mg, 60 count supply to [email or web address]

## 2020-02-26 NOTE — TELEPHONE ENCOUNTER
The patient was last seen on 5/10/2018 by Tarsha Marcus PA-C in the [facility] location  He is overdue for an office visit  The requested prescription is DECLINED at this time until an appointment can be scheduled and kept  Message will be forwarded to Call Center so they can schedule an appointment

## 2020-03-02 ENCOUNTER — OFFICE VISIT (OUTPATIENT)
Dept: INTERNAL MEDICINE CLINIC | Facility: CLINIC | Age: 55
End: 2020-03-02
Payer: COMMERCIAL

## 2020-03-02 VITALS
DIASTOLIC BLOOD PRESSURE: 90 MMHG | HEART RATE: 95 BPM | TEMPERATURE: 98.2 F | OXYGEN SATURATION: 95 % | SYSTOLIC BLOOD PRESSURE: 146 MMHG | BODY MASS INDEX: 42 KG/M2 | HEIGHT: 70 IN | WEIGHT: 293.4 LBS

## 2020-03-02 DIAGNOSIS — I10 BENIGN ESSENTIAL HYPERTENSION: Chronic | ICD-10-CM

## 2020-03-02 DIAGNOSIS — N20.1 LEFT URETERAL CALCULUS: ICD-10-CM

## 2020-03-02 DIAGNOSIS — E78.2 MIXED HYPERLIPIDEMIA: Primary | ICD-10-CM

## 2020-03-02 DIAGNOSIS — E11.9 TYPE 2 DIABETES MELLITUS WITHOUT COMPLICATION, WITHOUT LONG-TERM CURRENT USE OF INSULIN (HCC): ICD-10-CM

## 2020-03-02 DIAGNOSIS — E66.01 MORBID OBESITY (HCC): ICD-10-CM

## 2020-03-02 PROCEDURE — 2022F DILAT RTA XM EVC RTNOPTHY: CPT | Performed by: PHYSICIAN ASSISTANT

## 2020-03-02 PROCEDURE — 99214 OFFICE O/P EST MOD 30 MIN: CPT | Performed by: PHYSICIAN ASSISTANT

## 2020-03-02 PROCEDURE — 3077F SYST BP >= 140 MM HG: CPT | Performed by: PHYSICIAN ASSISTANT

## 2020-03-02 PROCEDURE — 3008F BODY MASS INDEX DOCD: CPT | Performed by: PHYSICIAN ASSISTANT

## 2020-03-02 PROCEDURE — 3080F DIAST BP >= 90 MM HG: CPT | Performed by: PHYSICIAN ASSISTANT

## 2020-03-02 PROCEDURE — 1036F TOBACCO NON-USER: CPT | Performed by: PHYSICIAN ASSISTANT

## 2020-03-02 PROCEDURE — 3046F HEMOGLOBIN A1C LEVEL >9.0%: CPT | Performed by: PHYSICIAN ASSISTANT

## 2020-03-02 NOTE — PROGRESS NOTES
Assessment/Plan: sugars are much better  Nearly all in the mid 100s  On 30 units of Lantus will wait another month and check it A1c at that time  Otherwise normally active feeling well no complaints physical exam unremarkable       Diagnoses and all orders for this visit:    Mixed hyperlipidemia    Morbid obesity ([facility] [address] )    Type 2 diabetes mellitus without complication, without long-term current use of insulin (HCC)    Benign essential hypertension    Left ureteral calculus        No problem-specific Assessment & Plan notes found for this encounter  Subjective:      Patient ID: Sabra Freed is a 53 y o  male  For follow-up he was seen a month ago A1c was high at that time and he was put on Lantus 30 units in addition to his other diabetic meds  He feels better he is putting on weight however  No hypoglycemia no polyuria polydipsia hypertension hyperlipidemia well controlled with meds      The following portions of the patient's history were reviewed and updated as appropriate:   He has a past medical history of Acute renal failure ([place] [address] ), Diabetes mellitus ([place] [address] ), Enteritis (8/23/2016), Gastroparesis due to DM ([facility] ) (8/23/2016), GERD (gastroesophageal reflux disease), Hernia, ventral (8/4/2016), HLD (hyperlipidemia) (8/23/2016), HTN (hypertension), benign (8/23/2016), Hyperlipidemia, Hypertension, and Intractable vomiting with nausea (8/23/2016)  ,  does not have any pertinent problems on file  ,   has a past surgical history that includes Esophagogastroduodenoscopy (N/A, 8/24/2016); Umbilical hernia repair; Tonsillectomy; UMBILICAL HERNIA REPAIR LAPAROSCOPIC (N/A, 4/26/2019); and Kidney stone surgery  ,  family history includes Coronary artery disease in his father; Diabetes in his mother; Lung cancer in his mother  ,   reports that he has never smoked  He has never used smokeless tobacco  He reports that he drinks alcohol  He reports that he does not use drugs  ,  is allergic to shellfish-derived products and erythromycin     Current Outpatient Medications   Medication Sig Dispense Refill    amLODIPine (NORVASC) 5 mg tablet TAKE 1 TABLET BY MOUTH TWICE A  tablet 0    aspirin 81 MG tablet Take 81 mg by mouth daily   atorvastatin (LIPITOR) 20 mg tablet TAKE 2 TABLETS BY MOUTH EVERY DAY (Patient taking differently: Take 40 mg by mouth daily ) 180 tablet 0    glyBURIDE-metFORMIN (GLUCOVANCE) 5-500 MG per tablet Take 2 tablets by mouth 2 times a day with meals 360 tablet 3    insulin glargine (LANTUS SOLOSTAR) 100 units/mL injection pen Inject 30 Units under the skin daily 5 pen 3    Insulin Pen Needle (PEN NEEDLES) 31G X 5 MM MISC by Does not apply route 4 (four) times a day 100 each 3    lisinopril (ZESTRIL) 40 mg tablet TAKE 1 TABLET BY MOUTH EVERY DAY 90 tablet 0    loratadine-pseudoephedrine (CLARITIN-D 24-HOUR)  mg per 24 hr tablet Take 1 tablet by mouth daily (Patient taking differently: Take 1 tablet by mouth daily ) 30 tablet 3    metoclopramide (REGLAN) 10 mg tablet Take 1 tablet (10 mg total) by mouth 2 (two) times a day 180 tablet 0    Needle, Disp, (HYPODERMIC NEEDLE) 23G X 1-1/2" MISC by Does not apply route once a week 10 each 6    pantoprazole (PROTONIX) 40 mg tablet TAKE 1 TABLET BY MOUTH TWICE A  tablet 0    phenazopyridine (PYRIDIUM) 200 mg tablet Take 200 mg by mouth Three times daily as needed       No current facility-administered medications for this visit  Review of Systems   Constitutional: Negative for activity change, appetite change, chills, diaphoresis, fatigue, fever and unexpected weight change  HENT: Negative for congestion, dental problem, drooling, ear discharge, ear pain, facial swelling, hearing loss, nosebleeds, postnasal drip, rhinorrhea, sinus pressure, sinus pain, sneezing, sore throat, tinnitus, trouble swallowing and voice change  Eyes: Negative for photophobia, pain, discharge, redness, itching and visual disturbance  Respiratory: Negative for apnea, cough, choking, chest tightness, shortness of breath, wheezing and stridor  Cardiovascular: Negative for chest pain, palpitations and leg swelling  Gastrointestinal: Negative for abdominal distention, abdominal pain, anal bleeding, blood in stool, constipation, diarrhea, nausea, rectal pain and vomiting  Endocrine: Negative for cold intolerance, heat intolerance, polydipsia, polyphagia and polyuria  Genitourinary: Negative for decreased urine volume, difficulty urinating, dysuria, enuresis, flank pain, frequency, genital sores, hematuria and urgency  Musculoskeletal: Negative for arthralgias, back pain, gait problem, joint swelling, myalgias, neck pain and neck stiffness  Skin: Negative for color change, pallor, rash and wound  Neurological: Negative for dizziness, tremors, seizures, syncope, facial asymmetry, speech difficulty, weakness, light-headedness, numbness and headaches  Hematological: Negative for adenopathy  Does not bruise/bleed easily  Psychiatric/Behavioral: Negative for agitation, behavioral problems, confusion, decreased concentration, dysphoric mood, hallucinations, self-injury, sleep disturbance and suicidal ideas  The patient is not nervous/anxious and is not hyperactive  Objective:  Vitals:    03/02/20 1445   BP: 146/90   BP Location: Left arm   Patient Position: Sitting   Cuff Size: Standard   Pulse: 95   Temp: 98 2 °F (36 8 °C)   TempSrc: Tympanic   SpO2: 95%   Weight: 133 kg (293 lb 6 4 oz)   Height: 5' 10" (1 778 m)     Body mass index is 42 1 kg/m²  Physical Exam   Constitutional: He is oriented to person, place, and time  He appears well-developed  obese   HENT:   Head: Normocephalic  Right Ear: External ear normal    Left Ear: External ear normal    Nose: Nose normal    Mouth/Throat: Oropharynx is clear and moist  No oropharyngeal exudate  Eyes: Pupils are equal, round, and reactive to light   Conjunctivae are normal  Neck: Neck supple  No JVD present  No thyromegaly present  Cardiovascular: Normal rate, regular rhythm, normal heart sounds and intact distal pulses  No murmur heard  Pulmonary/Chest: Effort normal and breath sounds normal  No stridor  No respiratory distress  Abdominal: Soft  Bowel sounds are normal    Musculoskeletal: Normal range of motion  He exhibits no edema  Lymphadenopathy:     He has no cervical adenopathy  Neurological: He is alert and oriented to person, place, and time  He has normal reflexes  Skin: Skin is warm and dry  Vitals reviewed

## 2020-03-03 ENCOUNTER — OFFICE VISIT (OUTPATIENT)
Dept: UROLOGY | Facility: MEDICAL CENTER | Age: 55
End: 2020-03-03
Payer: COMMERCIAL

## 2020-03-03 VITALS
WEIGHT: 289 LBS | DIASTOLIC BLOOD PRESSURE: 72 MMHG | HEART RATE: 66 BPM | BODY MASS INDEX: 41.37 KG/M2 | HEIGHT: 70 IN | SYSTOLIC BLOOD PRESSURE: 138 MMHG

## 2020-03-03 DIAGNOSIS — N20.0 NEPHROLITHIASIS: Primary | ICD-10-CM

## 2020-03-03 DIAGNOSIS — R79.89 LOW TESTOSTERONE IN MALE: ICD-10-CM

## 2020-03-03 DIAGNOSIS — E11.9 TYPE 2 DIABETES MELLITUS WITHOUT COMPLICATION, WITHOUT LONG-TERM CURRENT USE OF INSULIN (HCC): Primary | ICD-10-CM

## 2020-03-03 DIAGNOSIS — N52.9 ED (ERECTILE DYSFUNCTION) OF ORGANIC ORIGIN: ICD-10-CM

## 2020-03-03 PROCEDURE — 99213 OFFICE O/P EST LOW 20 MIN: CPT | Performed by: PHYSICIAN ASSISTANT

## 2020-03-03 PROCEDURE — 3078F DIAST BP <80 MM HG: CPT | Performed by: PHYSICIAN ASSISTANT

## 2020-03-03 PROCEDURE — 3008F BODY MASS INDEX DOCD: CPT | Performed by: PHYSICIAN ASSISTANT

## 2020-03-03 PROCEDURE — 3046F HEMOGLOBIN A1C LEVEL >9.0%: CPT | Performed by: PHYSICIAN ASSISTANT

## 2020-03-03 PROCEDURE — 2022F DILAT RTA XM EVC RTNOPTHY: CPT | Performed by: PHYSICIAN ASSISTANT

## 2020-03-03 PROCEDURE — 3075F SYST BP GE 130 - 139MM HG: CPT | Performed by: PHYSICIAN ASSISTANT

## 2020-03-03 PROCEDURE — 1036F TOBACCO NON-USER: CPT | Performed by: PHYSICIAN ASSISTANT

## 2020-03-03 RX ORDER — INSULIN LISPRO 100 [IU]/ML
INJECTION, SOLUTION INTRAVENOUS; SUBCUTANEOUS
Qty: 5 PEN | Refills: 1 | Status: SHIPPED | OUTPATIENT
Start: 2020-03-03 | End: 2021-03-23

## 2020-03-03 RX ORDER — SILDENAFIL 100 MG/1
100 TABLET, FILM COATED ORAL DAILY PRN
Qty: 60 TABLET | Refills: 3 | Status: SHIPPED | OUTPATIENT
Start: 2020-03-03 | End: 2022-03-07 | Stop reason: ALTCHOICE

## 2020-03-03 NOTE — PROGRESS NOTES
3/3/2020      Chief Complaint   Patient presents with    Follow-up    Testicular hypogonadism       Assessment and Plan    53 y o  male managed by Dr Sheree Lewis    1  5-8mm left sided ureteral calculi s/p ureteroscopy in [facility] 1/31/20  - CT did not mention other stone burden  - will obtain an updated KUB and U/S     2  Hypogonadism  -  given the patient has been off testosterone for a while will obtain a CBC, testosterone, and PSA for baseline values  - testosterone is low and CBC is normal can resume his on exogenous testosterone     3  ED  - continue sildenafil 100mg as needed    4  Prostate cancer screening  - PSA 0 7 (10/23/18)  - KRISTIAN recommended but the patient deferred    Will call once blood tests and imaging final       History of Present Illness  Sabra Freed is a 53 y o  male here for follow up evaluation of a recent kidney stone episode  The patient was in [facility] and he presented to the emergency department secondary to flank pain  CT scan was obtained and he was diagnosed with a 5-8 mm ureteral calculi  He underwent ureteroscopy and presents today in follow-up of this  The patient has had previous stones in the past   He states that he has also had intermittent issues with vomiting  This issue has completely resolved since the stone has been removed  He is known to us for hypogonadism, erectile dysfunction, prostate cancer screening  He was previously on testosterone cypionate 100 mg IM once weekly  He was doing well on this with improvement in his libido and erectile dysfunction  He has not been on this medication and he states he wishes to go back on it  He does continue on sildenafil 100 mg as needed for erectile dysfunction  His last PSA was 0 7 obtained 10/23/18/      Review of Systems   Constitutional: Negative for activity change, chills and fever  Gastrointestinal: Negative for abdominal distention and abdominal pain     Musculoskeletal: Negative for back pain and gait problem  Psychiatric/Behavioral: Negative for behavioral problems and confusion  Past Medical History  Past Medical History:   Diagnosis Date    Acute renal failure ([place] [address] )     [id number] resolved    Diabetes mellitus ([place] [address] )     Enteritis 8/23/2016    Gastroparesis due to DM ([place] [address] ) 8/23/2016    GERD (gastroesophageal reflux disease)     Hernia, ventral 8/4/2016    HLD (hyperlipidemia) 8/23/2016    HTN (hypertension), benign 8/23/2016    Hyperlipidemia     Hypertension     Intractable vomiting with nausea 8/23/2016       Past Social History  Past Surgical History:   Procedure Laterality Date    ESOPHAGOGASTRODUODENOSCOPY N/A 8/24/2016    Procedure: ESOPHAGOGASTRODUODENOSCOPY (EGD); Surgeon: Pebbles Mack MD;  [facility]: [facility];   Service:     KIDNEY STONE SURGERY      TONSILLECTOMY      UMBILICAL HERNIA REPAIR      UMBILICAL HERNIA REPAIR LAPAROSCOPIC N/A 4/26/2019    Procedure: LAPAROSCOPIC UMBILICAL HERNIA REPAIR;  Surgeon: Ramiro Mcconnell MD;  Location: [facility];  Service: General     Social History     Tobacco Use   Smoking Status Never Smoker   Smokeless Tobacco Never Used       Past Family History  Family History   Problem Relation Age of Onset    Diabetes Mother         mellitus    Lung cancer Mother     Coronary artery disease Father        Past Social history  Social History     Socioeconomic History    Marital status: /Civil Union     Spouse name: Not on file    Number of children: Not on file    Years of education: Not on file    Highest education level: Not on file   Occupational History    Not on file   Social Needs    Financial resource strain: Not on file    Food insecurity:     Worry: Not on file     Inability: Not on file    Transportation needs:     Medical: Not on file     Non-medical: Not on file   Tobacco Use    Smoking status: Never Smoker    Smokeless tobacco: Never Used   Substance and Sexual Activity    Alcohol use: Yes     Frequency: Monthly or less     Drinks per session: 1 or 2     Binge frequency: Never     Comment: occasion    Drug use: No    Sexual activity: Yes     Partners: Female   Lifestyle    Physical activity:     Days per week: 7 days     Minutes per session: 60 min    Stress: Not at all   Relationships    Social connections:     Talks on phone: Not on file     Gets together: Not on file     Attends Restorationism service: Not on file     Active member of club or organization: Not on file     Attends meetings of clubs or organizations: Not on file     Relationship status: Not on file    Intimate partner violence:     Fear of current or ex partner: Not on file     Emotionally abused: Not on file     Physically abused: Not on file     Forced sexual activity: Not on file   Other Topics Concern    Not on file   Social History Narrative    Not on file       Current Medications  Current Outpatient Medications   Medication Sig Dispense Refill    amLODIPine (NORVASC) 5 mg tablet TAKE 1 TABLET BY MOUTH TWICE A  tablet 0    aspirin 81 MG tablet Take 81 mg by mouth daily        atorvastatin (LIPITOR) 20 mg tablet TAKE 2 TABLETS BY MOUTH EVERY DAY (Patient taking differently: Take 40 mg by mouth daily ) 180 tablet 0    glyBURIDE-metFORMIN (GLUCOVANCE) 5-500 MG per tablet Take 2 tablets by mouth 2 times a day with meals 360 tablet 3    insulin glargine (LANTUS SOLOSTAR) 100 units/mL injection pen Inject 30 Units under the skin daily 5 pen 3    Insulin Pen Needle (PEN NEEDLES) 31G X 5 MM MISC by Does not apply route 4 (four) times a day 100 each 3    lisinopril (ZESTRIL) 40 mg tablet TAKE 1 TABLET BY MOUTH EVERY DAY 90 tablet 0    loratadine-pseudoephedrine (CLARITIN-D 24-HOUR)  mg per 24 hr tablet Take 1 tablet by mouth daily (Patient taking differently: Take 1 tablet by mouth daily ) 30 tablet 3    metoclopramide (REGLAN) 10 mg tablet Take 1 tablet (10 mg total) by mouth 2 (two) times a day 180 tablet 0    Needle, Disp, (HYPODERMIC NEEDLE) 23G X 1-1/2" MISC by Does not apply route once a week 10 each 6    pantoprazole (PROTONIX) 40 mg tablet TAKE 1 TABLET BY MOUTH TWICE A  tablet 0    phenazopyridine (PYRIDIUM) 200 mg tablet Take 200 mg by mouth Three times daily as needed      sildenafil (VIAGRA) 100 mg tablet Take 1 tablet (100 mg total) by mouth daily as needed for erectile dysfunction for up to 60 doses 1 hour prior to needing on an empty stomach 60 tablet 3     No current facility-administered medications for this visit  Allergies  Allergies   Allergen Reactions    Shellfish-Derived Products Anaphylaxis    Erythromycin GI Intolerance     Pt denies         The following portions of the patient's history were reviewed and updated as appropriate: allergies, current medications, past medical history, past social history, past surgical history and problem list       Vitals  Vitals:    03/03/20 1314   BP: 138/72   BP Location: Left arm   Patient Position: Sitting   Cuff Size: Large   Pulse: 66   Weight: 131 kg (289 lb)   Height: 5' 10" (1 778 m)           Physical Exam  Constitutional   General appearance: Patient is seated and in no acute distress, well appearing and well nourished  Head and Face   Head and face: Normal     Eyes   Conjunctiva and lids: No erythema, swelling or discharge  Ears, Nose, Mouth, and Throat   Hearing: Normal     Pulmonary   Respiratory effort: No increased work of breathing or signs of respiratory distress  Cardiovascular   Examination of extremities for edema and/or varicosities: Normal     Abdomen   Abdomen: Non-tender, no masses  Obese   Genitourinary   Digital rectal exam of prostate: deferred  Musculoskeletal   Gait and station: Normal   Skin   Skin and subcutaneous tissue: Warm, dry, and intact  No visible lesions or rashes    Psychiatric   Judgment and insight: Normal  Recent and remote memory:  Normal  Mood and affect: Normal      Results  No results found for this or any previous visit (from the past 1 hour(s)) ]  Lab Results   Component Value Date    PSA 0 7 10/23/2018    PSA 1 1 07/21/2017    PSA 0 8 04/10/2017     Lab Results   Component Value Date    GLUCOSE 118 (H) 05/02/2017    CALCIUM 8 9 01/04/2020     05/02/2017    K 4 7 02/04/2020    CO2 24 02/04/2020     02/04/2020    BUN 17 02/04/2020    CREATININE 1 49 (H) 02/04/2020     Lab Results   Component Value Date    WBC 11 1 (H) 02/04/2020    HGB 12 9 (L) 02/04/2020    HCT 38 5 02/04/2020    MCV 86 02/04/2020     02/04/2020       Orders  Orders Placed This Encounter   Procedures    XR abdomen 1 view kub     Standing Status:   Future     Standing Expiration Date:   3/3/2024     Scheduling Instructions:      Bring along any outside films relating to this procedure  Order Specific Question:   Reason for Exam:     Answer:   nephrolithiasis    US kidney and bladder     Standing Status:   Future     Standing Expiration Date:   3/3/2024     Scheduling Instructions:      "Prep required if being scheduled in conjunction with other studies, refer to those examination's Preps first before scheduling  All patients for US Kidney and Bladder they must drink 24 oz of water 60 minutes before your scheduled appointment time  This test requires you to have a FULL bladder  Please do not urinate before your test             Please bring your physician order, insurance cards, a form of photo ID and a list of your medications with you  Arrive 15 minutes prior to your appointment time in order to register  If you need to have lab work or a urinalysis, please do this AFTER your ultrasound  "            To schedule this appointment, please contact Central Scheduling at [phone]  Order Specific Question:   Reason for Exam:     Answer:   calculi    CBC     Standing Status:   Future     Standing Expiration Date:   3/3/2021    Testosterone     This is a patient instruction: Fasting preferred  Collections for men not undergoing treatment must be completed between 7am-9am ONLY  Collection time restrictions are not applicable to women or men already undergoing treatment  Standing Status:   Future     Standing Expiration Date:   3/3/2021    PSA, Total Screen     This is a patient instruction: This test is non-fasting  Please drink two glasses of water morning of bloodwork          Standing Status:   Future     Standing Expiration Date:   9/3/2021

## 2020-03-05 LAB
BASOPHILS # BLD AUTO: 0 X10E3/UL (ref 0–0.2)
BASOPHILS NFR BLD AUTO: 0 %
EOSINOPHIL # BLD AUTO: 0 X10E3/UL (ref 0–0.4)
EOSINOPHIL NFR BLD AUTO: 0 %
ERYTHROCYTE [DISTWIDTH] IN BLOOD BY AUTOMATED COUNT: 14.2 % (ref 11.6–15.4)
HCT VFR BLD AUTO: 38.8 % (ref 37.5–51)
HGB BLD-MCNC: 13 G/DL (ref 13–17.7)
IMM GRANULOCYTES # BLD: 0.1 X10E3/UL (ref 0–0.1)
IMM GRANULOCYTES NFR BLD: 1 %
LYMPHOCYTES # BLD AUTO: 3 X10E3/UL (ref 0.7–3.1)
LYMPHOCYTES NFR BLD AUTO: 29 %
MCH RBC QN AUTO: 28.8 PG (ref 26.6–33)
MCHC RBC AUTO-ENTMCNC: 33.5 G/DL (ref 31.5–35.7)
MCV RBC AUTO: 86 FL (ref 79–97)
MONOCYTES # BLD AUTO: 0.9 X10E3/UL (ref 0.1–0.9)
MONOCYTES NFR BLD AUTO: 8 %
NEUTROPHILS # BLD AUTO: 6.5 X10E3/UL (ref 1.4–7)
NEUTROPHILS NFR BLD AUTO: 62 %
PLATELET # BLD AUTO: 349 X10E3/UL (ref 150–450)
PSA SERPL-MCNC: 0.7 NG/ML (ref 0–4)
RBC # BLD AUTO: 4.52 X10E6/UL (ref 4.14–5.8)
TESTOST SERPL-MCNC: 270 NG/DL (ref 264–916)
WBC # BLD AUTO: 10.4 X10E3/UL (ref 3.4–10.8)

## 2020-03-12 DIAGNOSIS — K21.9 GASTROESOPHAGEAL REFLUX DISEASE WITHOUT ESOPHAGITIS: ICD-10-CM

## 2020-03-12 DIAGNOSIS — I10 ESSENTIAL HYPERTENSION: ICD-10-CM

## 2020-03-12 RX ORDER — LISINOPRIL 40 MG/1
40 TABLET ORAL DAILY
Qty: 90 TABLET | Refills: 3 | Status: SHIPPED | OUTPATIENT
Start: 2020-03-12 | End: 2021-03-23 | Stop reason: SDUPTHER

## 2020-03-12 RX ORDER — METOCLOPRAMIDE 10 MG/1
TABLET ORAL
Qty: 30 TABLET | Refills: 0 | Status: SHIPPED | OUTPATIENT
Start: 2020-03-12 | End: 2020-04-10 | Stop reason: SDUPTHER

## 2020-04-03 ENCOUNTER — TELEPHONE (OUTPATIENT)
Dept: GASTROENTEROLOGY | Facility: CLINIC | Age: 55
End: 2020-04-03

## 2020-04-10 DIAGNOSIS — K21.9 GASTROESOPHAGEAL REFLUX DISEASE WITHOUT ESOPHAGITIS: ICD-10-CM

## 2020-04-10 RX ORDER — METOCLOPRAMIDE 10 MG/1
10 TABLET ORAL DAILY
Qty: 30 TABLET | Refills: 0 | Status: SHIPPED | OUTPATIENT
Start: 2020-04-10 | End: 2020-04-11 | Stop reason: SDUPTHER

## 2020-04-11 ENCOUNTER — TELEPHONE (OUTPATIENT)
Dept: INTERNAL MEDICINE CLINIC | Facility: CLINIC | Age: 55
End: 2020-04-11

## 2020-04-11 DIAGNOSIS — K21.9 GASTROESOPHAGEAL REFLUX DISEASE WITHOUT ESOPHAGITIS: ICD-10-CM

## 2020-04-13 RX ORDER — METOCLOPRAMIDE 10 MG/1
10 TABLET ORAL DAILY
Qty: 30 TABLET | Refills: 0 | Status: SHIPPED | OUTPATIENT
Start: 2020-04-13 | End: 2020-05-13 | Stop reason: SDUPTHER

## 2020-04-29 DIAGNOSIS — K21.9 GASTROESOPHAGEAL REFLUX DISEASE WITHOUT ESOPHAGITIS: ICD-10-CM

## 2020-04-29 RX ORDER — PANTOPRAZOLE SODIUM 40 MG/1
TABLET, DELAYED RELEASE ORAL
Qty: 180 TABLET | Refills: 0 | Status: SHIPPED | OUTPATIENT
Start: 2020-04-29 | End: 2020-07-24

## 2020-05-13 DIAGNOSIS — K21.9 GASTROESOPHAGEAL REFLUX DISEASE WITHOUT ESOPHAGITIS: ICD-10-CM

## 2020-05-13 DIAGNOSIS — E11.9 TYPE 2 DIABETES MELLITUS WITHOUT COMPLICATION, WITHOUT LONG-TERM CURRENT USE OF INSULIN (HCC): ICD-10-CM

## 2020-05-13 DIAGNOSIS — E66.01 MORBID OBESITY (HCC): ICD-10-CM

## 2020-05-13 RX ORDER — PEN NEEDLE, DIABETIC 30 GX3/16"
NEEDLE, DISPOSABLE MISCELLANEOUS 4 TIMES DAILY
Qty: 100 EACH | Refills: 3 | Status: SHIPPED | OUTPATIENT
Start: 2020-05-13 | End: 2021-02-15

## 2020-05-13 RX ORDER — METOCLOPRAMIDE 10 MG/1
10 TABLET ORAL DAILY
Qty: 30 TABLET | Refills: 3 | Status: SHIPPED | OUTPATIENT
Start: 2020-05-13 | End: 2020-05-14 | Stop reason: SDUPTHER

## 2020-05-14 DIAGNOSIS — K21.9 GASTROESOPHAGEAL REFLUX DISEASE WITHOUT ESOPHAGITIS: ICD-10-CM

## 2020-05-14 RX ORDER — METOCLOPRAMIDE 10 MG/1
10 TABLET ORAL 2 TIMES DAILY
Qty: 180 TABLET | Refills: 0 | Status: SHIPPED | OUTPATIENT
Start: 2020-05-14 | End: 2020-08-12

## 2020-06-02 DIAGNOSIS — I10 BENIGN ESSENTIAL HYPERTENSION: Chronic | ICD-10-CM

## 2020-06-02 RX ORDER — AMLODIPINE BESYLATE 5 MG/1
TABLET ORAL
Qty: 180 TABLET | Refills: 0 | Status: SHIPPED | OUTPATIENT
Start: 2020-06-02 | End: 2020-08-28

## 2020-06-08 DIAGNOSIS — B86 SCABIES: Primary | ICD-10-CM

## 2020-06-08 RX ORDER — PERMETHRIN 50 MG/G
CREAM TOPICAL ONCE
Qty: 60 G | Refills: 0 | Status: SHIPPED | OUTPATIENT
Start: 2020-06-08 | End: 2020-06-08

## 2020-07-24 DIAGNOSIS — E78.2 MIXED HYPERLIPIDEMIA: Primary | ICD-10-CM

## 2020-07-24 DIAGNOSIS — K21.9 GASTROESOPHAGEAL REFLUX DISEASE WITHOUT ESOPHAGITIS: ICD-10-CM

## 2020-07-24 RX ORDER — PANTOPRAZOLE SODIUM 40 MG/1
TABLET, DELAYED RELEASE ORAL
Qty: 180 TABLET | Refills: 0 | Status: SHIPPED | OUTPATIENT
Start: 2020-07-24 | End: 2020-10-21

## 2020-07-24 RX ORDER — ATORVASTATIN CALCIUM 40 MG/1
TABLET, FILM COATED ORAL
Qty: 90 TABLET | Refills: 0 | Status: SHIPPED | OUTPATIENT
Start: 2020-07-24 | End: 2020-10-21

## 2020-08-06 DIAGNOSIS — E11.9 TYPE 2 DIABETES MELLITUS WITHOUT COMPLICATION, WITHOUT LONG-TERM CURRENT USE OF INSULIN (HCC): ICD-10-CM

## 2020-08-06 RX ORDER — INSULIN GLARGINE 100 [IU]/ML
INJECTION, SOLUTION SUBCUTANEOUS
Qty: 5 PEN | Refills: 2 | Status: SHIPPED | OUTPATIENT
Start: 2020-08-06 | End: 2021-01-05 | Stop reason: SDUPTHER

## 2020-08-12 DIAGNOSIS — K21.9 GASTROESOPHAGEAL REFLUX DISEASE WITHOUT ESOPHAGITIS: ICD-10-CM

## 2020-08-12 RX ORDER — METOCLOPRAMIDE 10 MG/1
TABLET ORAL
Qty: 180 TABLET | Refills: 0 | Status: SHIPPED | OUTPATIENT
Start: 2020-08-12 | End: 2020-11-12

## 2020-08-25 DIAGNOSIS — E11.9 TYPE 2 DIABETES MELLITUS WITHOUT COMPLICATION, WITHOUT LONG-TERM CURRENT USE OF INSULIN (HCC): ICD-10-CM

## 2020-08-25 RX ORDER — GLYBURIDE-METFORMIN HYDROCHLORIDE 5; 500 MG/1; MG/1
TABLET ORAL
Qty: 360 TABLET | Refills: 3 | Status: SHIPPED | OUTPATIENT
Start: 2020-08-25 | End: 2021-03-05 | Stop reason: SDUPTHER

## 2020-08-28 DIAGNOSIS — I10 BENIGN ESSENTIAL HYPERTENSION: Chronic | ICD-10-CM

## 2020-08-28 RX ORDER — AMLODIPINE BESYLATE 5 MG/1
TABLET ORAL
Qty: 180 TABLET | Refills: 0 | Status: SHIPPED | OUTPATIENT
Start: 2020-08-28 | End: 2020-12-11 | Stop reason: SDUPTHER

## 2020-10-21 DIAGNOSIS — K21.9 GASTROESOPHAGEAL REFLUX DISEASE WITHOUT ESOPHAGITIS: ICD-10-CM

## 2020-10-21 DIAGNOSIS — E78.2 MIXED HYPERLIPIDEMIA: ICD-10-CM

## 2020-10-21 RX ORDER — PANTOPRAZOLE SODIUM 40 MG/1
TABLET, DELAYED RELEASE ORAL
Qty: 180 TABLET | Refills: 0 | Status: SHIPPED | OUTPATIENT
Start: 2020-10-21 | End: 2021-02-11 | Stop reason: SDUPTHER

## 2020-10-21 RX ORDER — ATORVASTATIN CALCIUM 40 MG/1
TABLET, FILM COATED ORAL
Qty: 90 TABLET | Refills: 0 | Status: SHIPPED | OUTPATIENT
Start: 2020-10-21 | End: 2021-05-03 | Stop reason: SDUPTHER

## 2020-11-12 DIAGNOSIS — K21.9 GASTROESOPHAGEAL REFLUX DISEASE WITHOUT ESOPHAGITIS: ICD-10-CM

## 2020-11-12 RX ORDER — METOCLOPRAMIDE 10 MG/1
TABLET ORAL
Qty: 180 TABLET | Refills: 0 | Status: SHIPPED | OUTPATIENT
Start: 2020-11-12 | End: 2021-03-05 | Stop reason: SDUPTHER

## 2020-12-11 DIAGNOSIS — I10 BENIGN ESSENTIAL HYPERTENSION: Chronic | ICD-10-CM

## 2020-12-11 RX ORDER — AMLODIPINE BESYLATE 5 MG/1
5 TABLET ORAL 2 TIMES DAILY
Qty: 180 TABLET | Refills: 0 | Status: SHIPPED | OUTPATIENT
Start: 2020-12-11 | End: 2021-03-23 | Stop reason: SDUPTHER

## 2021-01-05 DIAGNOSIS — E11.9 TYPE 2 DIABETES MELLITUS WITHOUT COMPLICATION, WITHOUT LONG-TERM CURRENT USE OF INSULIN (HCC): ICD-10-CM

## 2021-01-05 RX ORDER — INSULIN GLARGINE 100 [IU]/ML
30 INJECTION, SOLUTION SUBCUTANEOUS DAILY
Qty: 5 PEN | Refills: 2 | Status: SHIPPED | OUTPATIENT
Start: 2021-01-05 | End: 2021-03-23 | Stop reason: SDUPTHER

## 2021-01-28 DIAGNOSIS — I10 BENIGN ESSENTIAL HYPERTENSION: ICD-10-CM

## 2021-01-29 RX ORDER — ATORVASTATIN CALCIUM 20 MG/1
40 TABLET, FILM COATED ORAL DAILY
Qty: 180 TABLET | Refills: 0 | Status: SHIPPED | OUTPATIENT
Start: 2021-01-29 | End: 2021-04-12

## 2021-02-11 DIAGNOSIS — K21.9 GASTROESOPHAGEAL REFLUX DISEASE WITHOUT ESOPHAGITIS: ICD-10-CM

## 2021-02-11 RX ORDER — PANTOPRAZOLE SODIUM 40 MG/1
40 TABLET, DELAYED RELEASE ORAL 2 TIMES DAILY
Qty: 180 TABLET | Refills: 3 | Status: SHIPPED | OUTPATIENT
Start: 2021-02-11 | End: 2021-03-23 | Stop reason: SDUPTHER

## 2021-02-11 NOTE — TELEPHONE ENCOUNTER
# [phone]    pantoprazole (PROTONIX) 40 mg tablet    [facility] # [phone]

## 2021-02-14 DIAGNOSIS — E66.01 MORBID OBESITY (HCC): ICD-10-CM

## 2021-02-14 DIAGNOSIS — E11.9 TYPE 2 DIABETES MELLITUS WITHOUT COMPLICATION, WITHOUT LONG-TERM CURRENT USE OF INSULIN (HCC): ICD-10-CM

## 2021-02-15 RX ORDER — PEN NEEDLE, DIABETIC 31 GX5/16"
NEEDLE, DISPOSABLE MISCELLANEOUS
Qty: 100 EACH | Refills: 3 | Status: SHIPPED | OUTPATIENT
Start: 2021-02-15 | End: 2022-05-12 | Stop reason: SDUPTHER

## 2021-03-05 DIAGNOSIS — K21.9 GASTROESOPHAGEAL REFLUX DISEASE WITHOUT ESOPHAGITIS: ICD-10-CM

## 2021-03-05 DIAGNOSIS — E11.9 TYPE 2 DIABETES MELLITUS WITHOUT COMPLICATION, WITHOUT LONG-TERM CURRENT USE OF INSULIN (HCC): ICD-10-CM

## 2021-03-05 RX ORDER — METOCLOPRAMIDE 10 MG/1
10 TABLET ORAL 2 TIMES DAILY
Qty: 180 TABLET | Refills: 0 | Status: SHIPPED | OUTPATIENT
Start: 2021-03-05 | End: 2021-05-20

## 2021-03-05 RX ORDER — GLYBURIDE-METFORMIN HYDROCHLORIDE 5; 500 MG/1; MG/1
TABLET ORAL
Qty: 360 TABLET | Refills: 1 | Status: SHIPPED | OUTPATIENT
Start: 2021-03-05 | End: 2021-03-23 | Stop reason: SDUPTHER

## 2021-03-05 NOTE — TELEPHONE ENCOUNTER
glyBURIDE-metFORMIN (GLUCOVANCE) 5-500 MG per tablet    metoclopramide (REGLAN) 10 mg tablet    [facility] [place] # [id number]     # [phone]

## 2021-03-10 DIAGNOSIS — Z23 ENCOUNTER FOR IMMUNIZATION: ICD-10-CM

## 2021-03-15 ENCOUNTER — TELEPHONE (OUTPATIENT)
Dept: INTERNAL MEDICINE CLINIC | Facility: CLINIC | Age: 56
End: 2021-03-15

## 2021-03-15 NOTE — TELEPHONE ENCOUNTER
amLODIPine (NORVASC) 5 mg tablet    insulin glargine (Lantus SoloStar) 100 units/mL injection pen    [facility] # [phone]     # [phone]

## 2021-03-16 DIAGNOSIS — I10 BENIGN ESSENTIAL HYPERTENSION: Chronic | ICD-10-CM

## 2021-03-16 DIAGNOSIS — E11.9 TYPE 2 DIABETES MELLITUS WITHOUT COMPLICATION, WITHOUT LONG-TERM CURRENT USE OF INSULIN (HCC): ICD-10-CM

## 2021-03-16 RX ORDER — INSULIN GLARGINE 100 [IU]/ML
30 INJECTION, SOLUTION SUBCUTANEOUS DAILY
OUTPATIENT
Start: 2021-03-16

## 2021-03-16 RX ORDER — AMLODIPINE BESYLATE 5 MG/1
5 TABLET ORAL 2 TIMES DAILY
Qty: 180 TABLET | Refills: 0 | OUTPATIENT
Start: 2021-03-16

## 2021-03-23 ENCOUNTER — OFFICE VISIT (OUTPATIENT)
Dept: INTERNAL MEDICINE CLINIC | Facility: CLINIC | Age: 56
End: 2021-03-23
Payer: COMMERCIAL

## 2021-03-23 VITALS
TEMPERATURE: 98.2 F | WEIGHT: 292.6 LBS | OXYGEN SATURATION: 98 % | SYSTOLIC BLOOD PRESSURE: 118 MMHG | BODY MASS INDEX: 41.89 KG/M2 | HEART RATE: 90 BPM | HEIGHT: 70 IN | DIASTOLIC BLOOD PRESSURE: 80 MMHG

## 2021-03-23 DIAGNOSIS — K21.9 GASTROESOPHAGEAL REFLUX DISEASE WITHOUT ESOPHAGITIS: ICD-10-CM

## 2021-03-23 DIAGNOSIS — E11.9 TYPE 2 DIABETES MELLITUS WITHOUT COMPLICATION, WITHOUT LONG-TERM CURRENT USE OF INSULIN (HCC): ICD-10-CM

## 2021-03-23 DIAGNOSIS — E78.2 MIXED HYPERLIPIDEMIA: ICD-10-CM

## 2021-03-23 DIAGNOSIS — I10 BENIGN ESSENTIAL HYPERTENSION: Chronic | ICD-10-CM

## 2021-03-23 DIAGNOSIS — E66.01 MORBID OBESITY (HCC): ICD-10-CM

## 2021-03-23 DIAGNOSIS — I10 ESSENTIAL HYPERTENSION: ICD-10-CM

## 2021-03-23 DIAGNOSIS — Z12.11 SCREENING FOR COLON CANCER: Primary | ICD-10-CM

## 2021-03-23 PROCEDURE — 99214 OFFICE O/P EST MOD 30 MIN: CPT | Performed by: PHYSICIAN ASSISTANT

## 2021-03-23 RX ORDER — INSULIN GLARGINE 100 [IU]/ML
30 INJECTION, SOLUTION SUBCUTANEOUS DAILY
Qty: 3 ML | Refills: 5 | Status: SHIPPED | OUTPATIENT
Start: 2021-03-23 | End: 2021-05-20 | Stop reason: SDUPTHER

## 2021-03-23 RX ORDER — PANTOPRAZOLE SODIUM 40 MG/1
40 TABLET, DELAYED RELEASE ORAL 2 TIMES DAILY
Qty: 180 TABLET | Refills: 3 | Status: SHIPPED | OUTPATIENT
Start: 2021-03-23 | End: 2022-03-11

## 2021-03-23 RX ORDER — INSULIN LISPRO 100 [IU]/ML
3 INJECTION, SOLUTION INTRAVENOUS; SUBCUTANEOUS
Qty: 3 ML | Refills: 5 | Status: SHIPPED | OUTPATIENT
Start: 2021-03-23 | End: 2021-03-23

## 2021-03-23 RX ORDER — AMLODIPINE BESYLATE 5 MG/1
5 TABLET ORAL 2 TIMES DAILY
Qty: 180 TABLET | Refills: 0 | Status: SHIPPED | OUTPATIENT
Start: 2021-03-23 | End: 2021-05-04

## 2021-03-23 RX ORDER — LISINOPRIL 40 MG/1
40 TABLET ORAL DAILY
Qty: 90 TABLET | Refills: 3 | Status: SHIPPED | OUTPATIENT
Start: 2021-03-23 | End: 2022-03-11

## 2021-03-23 RX ORDER — GLYBURIDE-METFORMIN HYDROCHLORIDE 5; 500 MG/1; MG/1
TABLET ORAL
Qty: 360 TABLET | Refills: 1 | Status: SHIPPED | OUTPATIENT
Start: 2021-03-23 | End: 2021-11-15

## 2021-03-23 RX ORDER — INSULIN LISPRO 100 [IU]/ML
INJECTION, SOLUTION INTRAVENOUS; SUBCUTANEOUS
Qty: 15 ML | Refills: 0 | Status: SHIPPED | OUTPATIENT
Start: 2021-03-23 | End: 2022-02-18 | Stop reason: CLARIF

## 2021-03-23 NOTE — PROGRESS NOTES
Assessment/Plan: asymptomatic physical exam unremarkable I renewed his medications getting labs he will go back on Januvia  He would like to try medical weight loss management  Diagnoses and all orders for this visit:    Screening for colon cancer  -     Ambulatory referral for colonoscopy; Future    Type 2 diabetes mellitus without complication, without long-term current use of insulin (HCC)  -     insulin lispro (HumaLOG KwikPen) 100 units/mL injection pen; Inject 3 Units under the skin 3 (three) times a day with meals  -     glyBURIDE-metFORMIN (GLUCOVANCE) 5-500 MG per tablet; Take 2 tabs twice daily  -     insulin glargine (Lantus SoloStar) 100 units/mL injection pen; Inject 30 Units under the skin daily  -     CBC and differential; Future  -     Comprehensive metabolic panel; Future  -     Hemoglobin A1C; Future  -     Lipid panel; Future  -     UA w Reflex to Microscopic w Reflex to Culture  -     TSH, 3rd generation; Future  -     PSA, Total Screen; Future  -     sitaGLIPtin (JANUVIA) 100 mg tablet; Take 1 tablet (100 mg total) by mouth daily    Gastroesophageal reflux disease without esophagitis  -     pantoprazole (PROTONIX) 40 mg tablet; Take 1 tablet (40 mg total) by mouth 2 (two) times a day  -     CBC and differential; Future  -     Comprehensive metabolic panel; Future  -     Hemoglobin A1C; Future  -     Lipid panel; Future  -     UA w Reflex to Microscopic w Reflex to Culture  -     TSH, 3rd generation; Future  -     PSA, Total Screen; Future    Essential hypertension  -     lisinopril (ZESTRIL) 40 mg tablet; Take 1 tablet (40 mg total) by mouth daily  -     CBC and differential; Future  -     Comprehensive metabolic panel; Future  -     Hemoglobin A1C; Future  -     Lipid panel; Future  -     UA w Reflex to Microscopic w Reflex to Culture  -     TSH, 3rd generation; Future  -     PSA, Total Screen; Future    Benign essential hypertension  -     amLODIPine (NORVASC) 5 mg tablet;  Take 1 tablet (5 mg total) by mouth 2 (two) times a day  -     CBC and differential; Future  -     Comprehensive metabolic panel; Future  -     Hemoglobin A1C; Future  -     Lipid panel; Future  -     UA w Reflex to Microscopic w Reflex to Culture  -     TSH, 3rd generation; Future  -     PSA, Total Screen; Future    Morbid obesity (HCC)  -     CBC and differential; Future  -     Comprehensive metabolic panel; Future  -     Hemoglobin A1C; Future  -     Lipid panel; Future  -     UA w Reflex to Microscopic w Reflex to Culture  -     TSH, 3rd generation; Future  -     PSA, Total Screen; Future  -     Ambulatory referral to Weight Management; Future    Mixed hyperlipidemia  -     CBC and differential; Future  -     Comprehensive metabolic panel; Future  -     Hemoglobin A1C; Future  -     Lipid panel; Future  -     UA w Reflex to Microscopic w Reflex to Culture  -     TSH, 3rd generation; Future  -     PSA, Total Screen; Future        No problem-specific Assessment & Plan notes found for this encounter  BMI Counseling: Body mass index is 41 98 kg/m²  The BMI is above normal  Nutrition recommendations include decreasing portion sizes  Exercise recommendations include exercising 3-5 times per week  Patient referred to weight management due to patient being overweight  Subjective:      Patient ID: Lucinda Mason is a 63 y o  male  For annual follow-up he is feeling well  His weight is stable sugars generally in the 200s no polyuria polydipsia normally active no current complaints except for some nocturnal leg cramps  He denies shortness of breath chest pain palpitations dizziness nausea vomiting  Insulin-dependent diabetes with gastroparesis  The gastroparesis has been under control with his meds    Hypertension hyperlipidemia which have been well controlled with medication      The following portions of the patient's history were reviewed and updated as appropriate:   He has a past medical history of Acute renal failure ([facility] ), Diabetes mellitus ([facility] ), Enteritis (8/23/2016), Gastroparesis due to DM ([facility] ) (8/23/2016), GERD (gastroesophageal reflux disease), Hernia, ventral (8/4/2016), HLD (hyperlipidemia) (8/23/2016), HTN (hypertension), benign (8/23/2016), Hyperlipidemia, Hypertension, and Intractable vomiting with nausea (8/23/2016)  ,  does not have any pertinent problems on file  ,   has a past surgical history that includes Esophagogastroduodenoscopy (N/A, 8/24/2016); Umbilical hernia repair; Tonsillectomy; UMBILICAL HERNIA REPAIR LAPAROSCOPIC (N/A, 4/26/2019); and Kidney stone surgery  ,  family history includes Coronary artery disease in his father; Diabetes in his mother; Lung cancer in his mother  ,   reports that he has never smoked  He has never used smokeless tobacco  He reports current alcohol use  He reports that he does not use drugs  ,  is allergic to shellfish-derived products and erythromycin     Current Outpatient Medications   Medication Sig Dispense Refill    amLODIPine (NORVASC) 5 mg tablet Take 1 tablet (5 mg total) by mouth 2 (two) times a day 180 tablet 0    aspirin 81 MG tablet Take 81 mg by mouth daily        atorvastatin (LIPITOR) 40 mg tablet TAKE 1 TABLET BY MOUTH EVERY DAY TO CONTROL CHOLESTEROL 90 tablet 0    B-D UF III MINI PEN NEEDLES 31G X 5 MM MISC BY DOES NOT APPLY ROUTE 4 (FOUR) TIMES A  each 3    glyBURIDE-metFORMIN (GLUCOVANCE) 5-500 MG per tablet Take 2 tabs twice daily 360 tablet 1    insulin glargine (Lantus SoloStar) 100 units/mL injection pen Inject 30 Units under the skin daily 3 mL 5    lisinopril (ZESTRIL) 40 mg tablet Take 1 tablet (40 mg total) by mouth daily 90 tablet 3    loratadine-pseudoephedrine (CLARITIN-D 24-HOUR)  mg per 24 hr tablet Take 1 tablet by mouth daily (Patient taking differently: Take 1 tablet by mouth daily ) 30 tablet 3    metoclopramide (REGLAN) 10 mg tablet Take 1 tablet (10 mg total) by mouth 2 (two) times a day 180 tablet 0    Needle, Disp, (HYPODERMIC NEEDLE) 23G X 1-1/2" MISC by Does not apply route once a week 10 each 6    pantoprazole (PROTONIX) 40 mg tablet Take 1 tablet (40 mg total) by mouth 2 (two) times a day 180 tablet 3    sildenafil (VIAGRA) 100 mg tablet Take 1 tablet (100 mg total) by mouth daily as needed for erectile dysfunction for up to 60 doses 1 hour prior to needing on an empty stomach 60 tablet 3    atorvastatin (LIPITOR) 20 mg tablet Take 2 tablets (40 mg total) by mouth daily (Patient not taking: Reported on 3/23/2021) 180 tablet 0    insulin lispro (HumaLOG KwikPen) 100 units/mL injection pen Inject 3 Units under the skin 3 (three) times a day with meals 3 mL 5    sitaGLIPtin (JANUVIA) 100 mg tablet Take 1 tablet (100 mg total) by mouth daily 90 tablet 3     No current facility-administered medications for this visit  Review of Systems   Constitutional: Negative for chills and fever  HENT: Negative for ear pain and sore throat  Eyes: Negative for pain and visual disturbance  Respiratory: Negative for cough and shortness of breath  Cardiovascular: Negative for chest pain and palpitations  Gastrointestinal: Negative for abdominal pain and vomiting  Genitourinary: Negative for dysuria and hematuria  Musculoskeletal: Negative for arthralgias and back pain  Skin: Negative for color change and rash  Neurological: Negative for seizures and syncope  All other systems reviewed and are negative  Objective:  Vitals:    03/23/21 1315   BP: 118/80   BP Location: Left arm   Patient Position: Sitting   Cuff Size: Standard   Pulse: 90   Temp: 98 2 °F (36 8 °C)   TempSrc: Temporal   SpO2: 98%   Weight: 133 kg (292 lb 9 6 oz)   Height: 5' 10" (1 778 m)     Body mass index is 41 98 kg/m²  Physical Exam  Vitals signs reviewed  Constitutional:       Appearance: He is well-developed  He is obese  HENT:      Head: Normocephalic        Right Ear: Tympanic membrane and external ear normal  Left Ear: Tympanic membrane and external ear normal       Nose: Nose normal       Mouth/Throat:      Mouth: Mucous membranes are moist    Eyes:      Extraocular Movements: Extraocular movements intact  Conjunctiva/sclera: Conjunctivae normal       Pupils: Pupils are equal, round, and reactive to light  Neck:      Musculoskeletal: Normal range of motion and neck supple  Thyroid: No thyromegaly  Cardiovascular:      Rate and Rhythm: Normal rate and regular rhythm  Pulses: Normal pulses  Heart sounds: Normal heart sounds  No murmur  Pulmonary:      Effort: Pulmonary effort is normal  No respiratory distress  Breath sounds: Normal breath sounds  No stridor  No wheezing or rhonchi  Abdominal:      General: Abdomen is flat  Bowel sounds are normal  There is no distension  Palpations: Abdomen is soft  Musculoskeletal: Normal range of motion  General: Swelling ( trace feet) present  Skin:     General: Skin is warm and dry  Neurological:      General: No focal deficit present  Mental Status: He is alert and oriented to person, place, and time  Sensory: No sensory deficit  Deep Tendon Reflexes: Reflexes are normal and symmetric  Psychiatric:         Mood and Affect: Mood normal          Thought Content:  Thought content normal          Judgment: Judgment normal

## 2021-04-06 ENCOUNTER — TELEPHONE (OUTPATIENT)
Dept: INTERNAL MEDICINE CLINIC | Facility: CLINIC | Age: 56
End: 2021-04-06

## 2021-04-10 LAB — HBA1C MFR BLD HPLC: 9.9 %

## 2021-04-12 DIAGNOSIS — E11.9 TYPE 2 DIABETES MELLITUS WITHOUT COMPLICATION, WITHOUT LONG-TERM CURRENT USE OF INSULIN (HCC): Primary | ICD-10-CM

## 2021-04-12 DIAGNOSIS — R74.8 ELEVATED LIVER ENZYMES: ICD-10-CM

## 2021-04-12 LAB
ALBUMIN SERPL-MCNC: 3.9 G/DL (ref 3.8–4.9)
ALBUMIN/GLOB SERPL: 1.4 {RATIO} (ref 1.2–2.2)
ALP SERPL-CCNC: 171 IU/L (ref 39–117)
ALT SERPL-CCNC: 51 IU/L (ref 0–44)
AST SERPL-CCNC: 50 IU/L (ref 0–40)
BASOPHILS # BLD AUTO: 0.1 X10E3/UL (ref 0–0.2)
BASOPHILS NFR BLD AUTO: 1 %
BILIRUB SERPL-MCNC: 0.6 MG/DL (ref 0–1.2)
BUN SERPL-MCNC: 15 MG/DL (ref 6–24)
BUN/CREAT SERPL: 12 (ref 9–20)
CALCIUM SERPL-MCNC: 9 MG/DL (ref 8.7–10.2)
CHLORIDE SERPL-SCNC: 98 MMOL/L (ref 96–106)
CHOLEST SERPL-MCNC: 107 MG/DL (ref 100–199)
CO2 SERPL-SCNC: 21 MMOL/L (ref 20–29)
CREAT SERPL-MCNC: 1.29 MG/DL (ref 0.76–1.27)
EOSINOPHIL # BLD AUTO: 0 X10E3/UL (ref 0–0.4)
EOSINOPHIL NFR BLD AUTO: 0 %
ERYTHROCYTE [DISTWIDTH] IN BLOOD BY AUTOMATED COUNT: 13.8 % (ref 11.6–15.4)
GLOBULIN SER-MCNC: 2.7 G/DL (ref 1.5–4.5)
GLUCOSE SERPL-MCNC: 149 MG/DL (ref 65–99)
HBA1C MFR BLD: 9.9 % (ref 4.8–5.6)
HCT VFR BLD AUTO: 39.3 % (ref 37.5–51)
HDLC SERPL-MCNC: 34 MG/DL
HGB BLD-MCNC: 12.7 G/DL (ref 13–17.7)
IMM GRANULOCYTES # BLD: 0.1 X10E3/UL (ref 0–0.1)
IMM GRANULOCYTES NFR BLD: 1 %
LDLC SERPL CALC-MCNC: 55 MG/DL (ref 0–99)
LYMPHOCYTES # BLD AUTO: 2.3 X10E3/UL (ref 0.7–3.1)
LYMPHOCYTES NFR BLD AUTO: 21 %
MCH RBC QN AUTO: 26.7 PG (ref 26.6–33)
MCHC RBC AUTO-ENTMCNC: 32.3 G/DL (ref 31.5–35.7)
MCV RBC AUTO: 83 FL (ref 79–97)
MONOCYTES # BLD AUTO: 1 X10E3/UL (ref 0.1–0.9)
MONOCYTES NFR BLD AUTO: 9 %
NEUTROPHILS # BLD AUTO: 7.7 X10E3/UL (ref 1.4–7)
NEUTROPHILS NFR BLD AUTO: 68 %
PLATELET # BLD AUTO: 333 X10E3/UL (ref 150–450)
POTASSIUM SERPL-SCNC: 5.1 MMOL/L (ref 3.5–5.2)
PROT SERPL-MCNC: 6.6 G/DL (ref 6–8.5)
PSA SERPL-MCNC: 0.5 NG/ML (ref 0–4)
RBC # BLD AUTO: 4.75 X10E6/UL (ref 4.14–5.8)
SL AMB EGFR AFRICAN AMERICAN: 71 ML/MIN/1.73
SL AMB EGFR NON AFRICAN AMERICAN: 62 ML/MIN/1.73
SL AMB VLDL CHOLESTEROL CALC: 18 MG/DL (ref 5–40)
SODIUM SERPL-SCNC: 136 MMOL/L (ref 134–144)
TRIGL SERPL-MCNC: 90 MG/DL (ref 0–149)
TSH SERPL DL<=0.005 MIU/L-ACNC: 1.07 UIU/ML (ref 0.45–4.5)
WBC # BLD AUTO: 11.1 X10E3/UL (ref 3.4–10.8)

## 2021-05-03 DIAGNOSIS — E78.2 MIXED HYPERLIPIDEMIA: ICD-10-CM

## 2021-05-03 RX ORDER — ATORVASTATIN CALCIUM 40 MG/1
40 TABLET, FILM COATED ORAL DAILY
Qty: 90 TABLET | Refills: 3 | Status: SHIPPED | OUTPATIENT
Start: 2021-05-03 | End: 2021-05-04 | Stop reason: SDUPTHER

## 2021-05-04 DIAGNOSIS — I10 BENIGN ESSENTIAL HYPERTENSION: Chronic | ICD-10-CM

## 2021-05-04 DIAGNOSIS — E78.2 MIXED HYPERLIPIDEMIA: ICD-10-CM

## 2021-05-04 RX ORDER — ATORVASTATIN CALCIUM 40 MG/1
40 TABLET, FILM COATED ORAL DAILY
Qty: 90 TABLET | Refills: 3 | Status: SHIPPED | OUTPATIENT
Start: 2021-05-04 | End: 2022-03-11

## 2021-05-04 RX ORDER — AMLODIPINE BESYLATE 5 MG/1
TABLET ORAL
Qty: 180 TABLET | Refills: 0 | Status: SHIPPED | OUTPATIENT
Start: 2021-05-04 | End: 2021-07-15

## 2021-05-04 NOTE — TELEPHONE ENCOUNTER
atorvastatin (LIPITOR) 40 mg tablet    Pt needs this to go to [facility]  looks like it may have gone to [facility] in [address]

## 2021-05-20 DIAGNOSIS — K21.9 GASTROESOPHAGEAL REFLUX DISEASE WITHOUT ESOPHAGITIS: ICD-10-CM

## 2021-05-20 DIAGNOSIS — E11.9 TYPE 2 DIABETES MELLITUS WITHOUT COMPLICATION, WITHOUT LONG-TERM CURRENT USE OF INSULIN (HCC): ICD-10-CM

## 2021-05-20 RX ORDER — METOCLOPRAMIDE 10 MG/1
TABLET ORAL
Qty: 180 TABLET | Refills: 0 | Status: SHIPPED | OUTPATIENT
Start: 2021-05-20 | End: 2021-07-15

## 2021-05-20 RX ORDER — INSULIN GLARGINE 100 [IU]/ML
30 INJECTION, SOLUTION SUBCUTANEOUS DAILY
Qty: 3 ML | Refills: 5 | Status: SHIPPED | OUTPATIENT
Start: 2021-05-20 | End: 2021-07-15

## 2021-05-20 NOTE — TELEPHONE ENCOUNTER
insulin glargine (Lantus SoloStar) 100 units/mL injection [facility] # [phone]

## 2021-07-14 ENCOUNTER — TELEPHONE (OUTPATIENT)
Dept: INTERNAL MEDICINE CLINIC | Facility: CLINIC | Age: 56
End: 2021-07-14

## 2021-07-14 NOTE — TELEPHONE ENCOUNTER
He needs his medical record  with his name,  and 's signature   Tara Joshi  just one ov note is fine    needed in order to prove his identidy to get his children's social security cards    he wants to get this today or tomorrow    call when ready for him to

## 2021-07-15 DIAGNOSIS — E11.9 TYPE 2 DIABETES MELLITUS WITHOUT COMPLICATION, WITHOUT LONG-TERM CURRENT USE OF INSULIN (HCC): ICD-10-CM

## 2021-07-15 DIAGNOSIS — I10 BENIGN ESSENTIAL HYPERTENSION: Chronic | ICD-10-CM

## 2021-07-15 DIAGNOSIS — K21.9 GASTROESOPHAGEAL REFLUX DISEASE WITHOUT ESOPHAGITIS: ICD-10-CM

## 2021-07-15 RX ORDER — INSULIN GLARGINE 100 [IU]/ML
30 INJECTION, SOLUTION SUBCUTANEOUS DAILY
Qty: 15 ML | Refills: 1 | Status: SHIPPED | OUTPATIENT
Start: 2021-07-15 | End: 2021-11-16

## 2021-07-15 RX ORDER — METOCLOPRAMIDE 10 MG/1
TABLET ORAL
Qty: 180 TABLET | Refills: 0 | Status: SHIPPED | OUTPATIENT
Start: 2021-07-15 | End: 2021-11-15

## 2021-07-15 RX ORDER — AMLODIPINE BESYLATE 5 MG/1
TABLET ORAL
Qty: 180 TABLET | Refills: 0 | Status: SHIPPED | OUTPATIENT
Start: 2021-07-15 | End: 2021-11-15

## 2021-07-16 ENCOUNTER — TELEPHONE (OUTPATIENT)
Dept: INTERNAL MEDICINE CLINIC | Facility: CLINIC | Age: 56
End: 2021-07-16

## 2021-07-16 NOTE — TELEPHONE ENCOUNTER
Pt needs a letter stating he is a pt of dr Trixie Kowalski, with name and , and signed    Pt is here

## 2021-07-16 NOTE — LETTER
July 16, 2021     Patient: Stacy Morales   YOB: 1965   Date of Visit: 7/16/2021       To Whom it May Concern:    Jefsuellen Nephew is under my professional care  If you have any questions or concerns, please don't hesitate to call           Sincerely,               Dr Oumou Beltran

## 2021-08-27 LAB
ALBUMIN SERPL-MCNC: 3.8 G/DL (ref 3.8–4.9)
ALBUMIN/GLOB SERPL: 1.4 {RATIO} (ref 1.2–2.2)
ALP SERPL-CCNC: 209 IU/L (ref 48–121)
ALT SERPL-CCNC: 41 IU/L (ref 0–44)
APPEARANCE UR: CLEAR
AST SERPL-CCNC: 42 IU/L (ref 0–40)
BACTERIA URNS QL MICRO: ABNORMAL
BASOPHILS # BLD AUTO: 0.1 X10E3/UL (ref 0–0.2)
BASOPHILS NFR BLD AUTO: 1 %
BILIRUB SERPL-MCNC: 0.4 MG/DL (ref 0–1.2)
BILIRUB UR QL STRIP: NEGATIVE
BUN SERPL-MCNC: 18 MG/DL (ref 6–24)
BUN/CREAT SERPL: 14 (ref 9–20)
CALCIUM SERPL-MCNC: 9.2 MG/DL (ref 8.7–10.2)
CASTS URNS QL MICRO: ABNORMAL /LPF
CHLORIDE SERPL-SCNC: 101 MMOL/L (ref 96–106)
CHOLEST SERPL-MCNC: 125 MG/DL (ref 100–199)
CO2 SERPL-SCNC: 23 MMOL/L (ref 20–29)
COLOR UR: YELLOW
CREAT SERPL-MCNC: 1.31 MG/DL (ref 0.76–1.27)
CRYSTALS URNS MICRO: ABNORMAL
EOSINOPHIL # BLD AUTO: 0 X10E3/UL (ref 0–0.4)
EOSINOPHIL NFR BLD AUTO: 0 %
EPI CELLS #/AREA URNS HPF: ABNORMAL /HPF (ref 0–10)
ERYTHROCYTE [DISTWIDTH] IN BLOOD BY AUTOMATED COUNT: 14.3 % (ref 11.6–15.4)
GLOBULIN SER-MCNC: 2.7 G/DL (ref 1.5–4.5)
GLUCOSE SERPL-MCNC: 179 MG/DL (ref 65–99)
GLUCOSE UR QL: ABNORMAL
HAV IGM SERPL QL IA: NEGATIVE
HBA1C MFR BLD: 8 % (ref 4.8–5.6)
HBV CORE IGM SERPL QL IA: NEGATIVE
HBV SURFACE AG SERPL QL IA: NEGATIVE
HCT VFR BLD AUTO: 36 % (ref 37.5–51)
HCV AB S/CO SERPL IA: 0.1 S/CO RATIO (ref 0–0.9)
HCV AB S/CO SERPL IA: 0.1 S/CO RATIO (ref 0–0.9)
HDLC SERPL-MCNC: 45 MG/DL
HGB BLD-MCNC: 11.5 G/DL (ref 13–17.7)
HGB UR QL STRIP: NEGATIVE
IMM GRANULOCYTES # BLD: 0.1 X10E3/UL (ref 0–0.1)
IMM GRANULOCYTES NFR BLD: 1 %
KETONES UR QL STRIP: NEGATIVE
LDLC SERPL CALC-MCNC: 65 MG/DL (ref 0–99)
LEUKOCYTE ESTERASE UR QL STRIP: NEGATIVE
LYMPHOCYTES # BLD AUTO: 2.3 X10E3/UL (ref 0.7–3.1)
LYMPHOCYTES NFR BLD AUTO: 19 %
MCH RBC QN AUTO: 25.4 PG (ref 26.6–33)
MCHC RBC AUTO-ENTMCNC: 31.9 G/DL (ref 31.5–35.7)
MCV RBC AUTO: 80 FL (ref 79–97)
MICRO URNS: ABNORMAL
MONOCYTES # BLD AUTO: 1.1 X10E3/UL (ref 0.1–0.9)
MONOCYTES NFR BLD AUTO: 9 %
NEUTROPHILS # BLD AUTO: 8.7 X10E3/UL (ref 1.4–7)
NEUTROPHILS NFR BLD AUTO: 70 %
NITRITE UR QL STRIP: NEGATIVE
PH UR STRIP: 5.5 [PH] (ref 5–7.5)
PLATELET # BLD AUTO: 403 X10E3/UL (ref 150–450)
POTASSIUM SERPL-SCNC: 5 MMOL/L (ref 3.5–5.2)
PROT SERPL-MCNC: 6.5 G/DL (ref 6–8.5)
PROT UR QL STRIP: ABNORMAL
PSA SERPL-MCNC: 0.5 NG/ML (ref 0–4)
RBC # BLD AUTO: 4.52 X10E6/UL (ref 4.14–5.8)
RBC #/AREA URNS HPF: ABNORMAL /HPF (ref 0–2)
SL AMB EGFR AFRICAN AMERICAN: 69 ML/MIN/1.73
SL AMB EGFR NON AFRICAN AMERICAN: 60 ML/MIN/1.73
SL AMB INTERPRETATION: NORMAL
SL AMB URINALYSIS REFLEX: ABNORMAL
SL AMB VLDL CHOLESTEROL CALC: 15 MG/DL (ref 5–40)
SODIUM SERPL-SCNC: 136 MMOL/L (ref 134–144)
SP GR UR: 1.03 (ref 1–1.03)
TRIGL SERPL-MCNC: 75 MG/DL (ref 0–149)
TSH SERPL DL<=0.005 MIU/L-ACNC: 0.72 UIU/ML (ref 0.45–4.5)
UNIDENT CRYS URNS QL MICRO: PRESENT
UROBILINOGEN UR STRIP-ACNC: 1 MG/DL (ref 0.2–1)
WBC # BLD AUTO: 12.3 X10E3/UL (ref 3.4–10.8)
WBC #/AREA URNS HPF: ABNORMAL /HPF (ref 0–5)

## 2021-10-21 ENCOUNTER — TELEPHONE (OUTPATIENT)
Dept: INTERNAL MEDICINE CLINIC | Facility: CLINIC | Age: 56
End: 2021-10-21
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
## 2021-10-21 RX ORDER — CLOBETASOL PROPIONATE 0.5 MG/G
OINTMENT TOPICAL 2 TIMES DAILY
Qty: 30 G | Refills: 0 | Status: SHIPPED | OUTPATIENT
Start: 2021-10-21 | End: 2022-02-18 | Stop reason: CLARIF

## 2021-11-15 DIAGNOSIS — K21.9 GASTROESOPHAGEAL REFLUX DISEASE WITHOUT ESOPHAGITIS: ICD-10-CM

## 2021-11-15 DIAGNOSIS — I10 BENIGN ESSENTIAL HYPERTENSION: Chronic | ICD-10-CM

## 2021-11-15 DIAGNOSIS — E11.9 TYPE 2 DIABETES MELLITUS WITHOUT COMPLICATION, WITHOUT LONG-TERM CURRENT USE OF INSULIN (HCC): ICD-10-CM

## 2021-11-15 RX ORDER — AMLODIPINE BESYLATE 5 MG/1
TABLET ORAL
Qty: 180 TABLET | Refills: 0 | Status: SHIPPED | OUTPATIENT
Start: 2021-11-15 | End: 2022-02-08

## 2021-11-15 RX ORDER — GLYBURIDE-METFORMIN HYDROCHLORIDE 5; 500 MG/1; MG/1
TABLET ORAL
Qty: 360 TABLET | Refills: 0 | Status: SHIPPED | OUTPATIENT
Start: 2021-11-15 | End: 2022-02-08

## 2021-11-15 RX ORDER — METOCLOPRAMIDE 10 MG/1
TABLET ORAL
Qty: 180 TABLET | Refills: 0 | Status: SHIPPED | OUTPATIENT
Start: 2021-11-15 | End: 2022-03-11 | Stop reason: SDUPTHER

## 2021-12-07 ENCOUNTER — TELEPHONE (OUTPATIENT)
Dept: INTERNAL MEDICINE CLINIC | Facility: CLINIC | Age: 56
End: 2021-12-07

## 2021-12-14 ENCOUNTER — APPOINTMENT (RX ONLY)
Dept: URBAN - METROPOLITAN AREA CLINIC 288 | Facility: CLINIC | Age: 56
Setting detail: DERMATOLOGY
End: 2021-12-14

## 2021-12-14 DIAGNOSIS — A63.0 ANOGENITAL (VENEREAL) WARTS: ICD-10-CM

## 2021-12-14 DIAGNOSIS — L91.8 OTHER HYPERTROPHIC DISORDERS OF THE SKIN: ICD-10-CM

## 2021-12-14 PROCEDURE — 17110 DESTRUCTION B9 LES UP TO 14: CPT

## 2021-12-14 PROCEDURE — ? LIQUID NITROGEN

## 2021-12-14 PROCEDURE — ? COUNSELING

## 2021-12-14 PROCEDURE — ? SKIN TAG REMOVAL (COSMETIC)

## 2021-12-14 ASSESSMENT — LOCATION SIMPLE DESCRIPTION DERM
LOCATION SIMPLE: LEFT THIGH
LOCATION SIMPLE: LEFT SCROTUM

## 2021-12-14 ASSESSMENT — LOCATION DETAILED DESCRIPTION DERM
LOCATION DETAILED: LEFT ANTERIOR PROXIMAL THIGH
LOCATION DETAILED: LEFT INFERIOR LATERAL SCROTUM

## 2021-12-14 ASSESSMENT — LOCATION ZONE DERM
LOCATION ZONE: SCROTUM
LOCATION ZONE: LEG

## 2021-12-14 NOTE — PROCEDURE: SKIN TAG REMOVAL (COSMETIC)
Anesthesia Type: 1% lidocaine with epinephrine
Consent: Written consent obtained and the risks of skin tag removal was reviewed with the patient including but not limited to bleeding, pigmentary change, infection, pain, and remote possibility of scarring.
Removed With: scissors
Price (Use Numbers Only, No Special Characters Or $): 50
Detail Level: Detailed
Anesthesia Volume In Cc: 0

## 2021-12-14 NOTE — PROCEDURE: LIQUID NITROGEN
Detail Level: Detailed
Consent: The patient's verbal consent was obtained including but not limited to risks of crusting, scabbing, blistering, scarring, darker or lighter pigmentary change, recurrence, incomplete removal and infection.
Add 52 Modifier (Optional): no
Medical Necessity Information: It is in your best interest to select a reason for this procedure from the list below. All of these items fulfill various CMS LCD requirements except the new and changing color options.
Post-Care Instructions: I reviewed with the patient in detail post-care instructions. Patient is to wear sunprotection, and avoid picking at any of the treated lesions. Pt may apply Vaseline to crusted or scabbing areas.
Show Applicator Variable?: Yes
Medical Necessity Clause: This procedure was medically necessary because the lesions that were treated were:

## 2021-12-29 ENCOUNTER — APPOINTMENT (RX ONLY)
Dept: URBAN - METROPOLITAN AREA CLINIC 288 | Facility: CLINIC | Age: 56
Setting detail: DERMATOLOGY
End: 2021-12-29

## 2021-12-29 DIAGNOSIS — A63.0 ANOGENITAL (VENEREAL) WARTS: ICD-10-CM

## 2021-12-29 PROCEDURE — 17110 DESTRUCTION B9 LES UP TO 14: CPT

## 2021-12-29 PROCEDURE — ? LIQUID NITROGEN

## 2021-12-29 PROCEDURE — ? COUNSELING

## 2021-12-29 ASSESSMENT — LOCATION ZONE DERM
LOCATION ZONE: SCROTUM
LOCATION ZONE: LEG

## 2021-12-29 ASSESSMENT — LOCATION DETAILED DESCRIPTION DERM
LOCATION DETAILED: LEFT ANTERIOR PROXIMAL THIGH
LOCATION DETAILED: LEFT INFERIOR LATERAL SCROTUM

## 2021-12-29 ASSESSMENT — LOCATION SIMPLE DESCRIPTION DERM
LOCATION SIMPLE: LEFT THIGH
LOCATION SIMPLE: LEFT SCROTUM

## 2022-01-01 ENCOUNTER — TELEPHONE (OUTPATIENT)
Dept: HEMATOLOGY ONCOLOGY | Facility: CLINIC | Age: 57
End: 2022-01-01

## 2022-01-01 DIAGNOSIS — R53.83 OTHER FATIGUE: Primary | ICD-10-CM

## 2022-01-01 DIAGNOSIS — C18.4 MALIGNANT NEOPLASM OF TRANSVERSE COLON (HCC): ICD-10-CM

## 2022-01-01 DIAGNOSIS — G62.0 CHEMOTHERAPY-INDUCED PERIPHERAL NEUROPATHY: Primary | ICD-10-CM

## 2022-01-01 DIAGNOSIS — T45.1X5A CHEMOTHERAPY-INDUCED PERIPHERAL NEUROPATHY: Primary | ICD-10-CM

## 2022-01-01 RX ORDER — SODIUM CHLORIDE 9 MG/ML
20 INJECTION, SOLUTION INTRAVENOUS ONCE
Status: CANCELLED | OUTPATIENT
Start: 2022-01-01

## 2022-01-01 RX ORDER — FLUOROURACIL 50 MG/ML
400 INJECTION, SOLUTION INTRAVENOUS ONCE
Status: CANCELLED | OUTPATIENT
Start: 2022-01-01

## 2022-01-01 RX ORDER — DEXTROSE MONOHYDRATE 50 MG/ML
20 INJECTION, SOLUTION INTRAVENOUS ONCE
Status: CANCELLED | OUTPATIENT
Start: 2022-01-01

## 2022-01-01 RX ORDER — DULOXETIN HYDROCHLORIDE 30 MG/1
CAPSULE, DELAYED RELEASE ORAL
Qty: 49 CAPSULE | Refills: 0 | Status: SHIPPED | OUTPATIENT
Start: 2022-01-01 | End: 2022-01-01

## 2022-01-19 ENCOUNTER — TELEPHONE (OUTPATIENT)
Dept: INTERNAL MEDICINE CLINIC | Facility: CLINIC | Age: 57
End: 2022-01-19

## 2022-01-19 NOTE — TELEPHONE ENCOUNTER
Spoke with: patient  Re:    Provider's message/resuts/instructions/inquiries relayed in full detal

## 2022-01-19 NOTE — TELEPHONE ENCOUNTER
Patient scheduled a routine follow up for 2/22  Please put in lab orders  Notify patient when orders are ready   He has to use [facility]

## 2022-01-25 DIAGNOSIS — E11.9 TYPE 2 DIABETES MELLITUS WITHOUT COMPLICATION, WITHOUT LONG-TERM CURRENT USE OF INSULIN (HCC): ICD-10-CM

## 2022-01-25 RX ORDER — INSULIN GLARGINE 100 [IU]/ML
30 INJECTION, SOLUTION SUBCUTANEOUS DAILY
Qty: 15 ML | Refills: 0 | Status: SHIPPED | OUTPATIENT
Start: 2022-01-25 | End: 2022-02-25 | Stop reason: HOSPADM

## 2022-01-25 NOTE — TELEPHONE ENCOUNTER
Pt calling for a refill on lantus, stated that he only has a couple of days left and has an appointment scheduled with PCP in feb

## 2022-01-27 ENCOUNTER — TELEPHONE (OUTPATIENT)
Dept: INTERNAL MEDICINE CLINIC | Facility: CLINIC | Age: 57
End: 2022-01-27

## 2022-02-08 DIAGNOSIS — I10 BENIGN ESSENTIAL HYPERTENSION: Chronic | ICD-10-CM

## 2022-02-08 DIAGNOSIS — E11.9 TYPE 2 DIABETES MELLITUS WITHOUT COMPLICATION, WITHOUT LONG-TERM CURRENT USE OF INSULIN (HCC): ICD-10-CM

## 2022-02-08 RX ORDER — AMLODIPINE BESYLATE 5 MG/1
TABLET ORAL
Qty: 180 TABLET | Refills: 0 | Status: SHIPPED | OUTPATIENT
Start: 2022-02-08 | End: 2022-05-09

## 2022-02-08 RX ORDER — GLYBURIDE-METFORMIN HYDROCHLORIDE 5; 500 MG/1; MG/1
TABLET ORAL
Qty: 360 TABLET | Refills: 0 | Status: SHIPPED | OUTPATIENT
Start: 2022-02-08 | End: 2022-03-07 | Stop reason: ALTCHOICE

## 2022-02-11 LAB
HAV IGM SERPL QL IA: NEGATIVE
HBA1C MFR BLD: 7.5 % (ref 4.8–5.6)
HBV CORE AB SERPL QL IA: NEGATIVE
HBV CORE IGM SERPL QL IA: NEGATIVE
HBV SURFACE AB SER QL: NON REACTIVE
HBV SURFACE AG SERPL QL IA: NEGATIVE
HCV AB S/CO SERPL IA: 0.1 S/CO RATIO (ref 0–0.9)
SL AMB INTERPRETATION: NORMAL

## 2022-02-18 ENCOUNTER — HOSPITAL ENCOUNTER (INPATIENT)
Facility: HOSPITAL | Age: 57
LOS: 7 days | Discharge: HOME HEALTH | DRG: 330 | End: 2022-02-25
Attending: EMERGENCY MEDICINE | Admitting: INTERNAL MEDICINE
Payer: COMMERCIAL

## 2022-02-18 ENCOUNTER — APPOINTMENT (EMERGENCY)
Dept: CT IMAGING | Facility: HOSPITAL | Age: 57
DRG: 330 | End: 2022-02-18
Payer: COMMERCIAL

## 2022-02-18 DIAGNOSIS — R16.0 LIVER MASS: ICD-10-CM

## 2022-02-18 DIAGNOSIS — E78.5 TYPE 2 DIABETES MELLITUS WITH HYPERLIPIDEMIA (HCC): ICD-10-CM

## 2022-02-18 DIAGNOSIS — K63.89 COLONIC MASS: Primary | ICD-10-CM

## 2022-02-18 DIAGNOSIS — E11.69 TYPE 2 DIABETES MELLITUS WITH HYPERLIPIDEMIA (HCC): ICD-10-CM

## 2022-02-18 PROBLEM — D50.9 MICROCYTIC ANEMIA: Status: ACTIVE | Noted: 2022-02-18

## 2022-02-18 LAB
ABO GROUP BLD: NORMAL
ABO GROUP BLD: NORMAL
ALBUMIN SERPL BCP-MCNC: 3 G/DL (ref 3.5–5)
ALP SERPL-CCNC: 349 U/L (ref 46–116)
ALT SERPL W P-5'-P-CCNC: 37 U/L (ref 12–78)
ANION GAP SERPL CALCULATED.3IONS-SCNC: 12 MMOL/L (ref 4–13)
AST SERPL W P-5'-P-CCNC: 67 U/L (ref 5–45)
BASOPHILS # BLD AUTO: 0.09 THOUSANDS/ΜL (ref 0–0.1)
BASOPHILS NFR BLD AUTO: 1 % (ref 0–1)
BILIRUB SERPL-MCNC: 0.73 MG/DL (ref 0.2–1)
BLD GP AB SCN SERPL QL: NEGATIVE
BUN SERPL-MCNC: 22 MG/DL (ref 5–25)
CALCIUM ALBUM COR SERPL-MCNC: 9.9 MG/DL (ref 8.3–10.1)
CALCIUM SERPL-MCNC: 9.1 MG/DL (ref 8.3–10.1)
CHLORIDE SERPL-SCNC: 98 MMOL/L (ref 100–108)
CO2 SERPL-SCNC: 26 MMOL/L (ref 21–32)
CREAT SERPL-MCNC: 1.57 MG/DL (ref 0.6–1.3)
EOSINOPHIL # BLD AUTO: 0 THOUSAND/ΜL (ref 0–0.61)
EOSINOPHIL NFR BLD AUTO: 0 % (ref 0–6)
ERYTHROCYTE [DISTWIDTH] IN BLOOD BY AUTOMATED COUNT: 18.7 % (ref 11.6–15.1)
GFR SERPL CREATININE-BSD FRML MDRD: 48 ML/MIN/1.73SQ M
GLUCOSE SERPL-MCNC: 115 MG/DL (ref 65–140)
GLUCOSE SERPL-MCNC: 145 MG/DL (ref 65–140)
GLUCOSE SERPL-MCNC: 57 MG/DL (ref 65–140)
GLUCOSE SERPL-MCNC: 81 MG/DL (ref 65–140)
HCT VFR BLD AUTO: 35.8 % (ref 36.5–49.3)
HGB BLD-MCNC: 10.9 G/DL (ref 12–17)
IMM GRANULOCYTES # BLD AUTO: 0.08 THOUSAND/UL (ref 0–0.2)
IMM GRANULOCYTES NFR BLD AUTO: 1 % (ref 0–2)
LIPASE SERPL-CCNC: 195 U/L (ref 73–393)
LYMPHOCYTES # BLD AUTO: 2.86 THOUSANDS/ΜL (ref 0.6–4.47)
LYMPHOCYTES NFR BLD AUTO: 18 % (ref 14–44)
MCH RBC QN AUTO: 22.5 PG (ref 26.8–34.3)
MCHC RBC AUTO-ENTMCNC: 30.4 G/DL (ref 31.4–37.4)
MCV RBC AUTO: 74 FL (ref 82–98)
MONOCYTES # BLD AUTO: 1.64 THOUSAND/ΜL (ref 0.17–1.22)
MONOCYTES NFR BLD AUTO: 11 % (ref 4–12)
NEUTROPHILS # BLD AUTO: 10.99 THOUSANDS/ΜL (ref 1.85–7.62)
NEUTS SEG NFR BLD AUTO: 69 % (ref 43–75)
NRBC BLD AUTO-RTO: 0 /100 WBCS
PLATELET # BLD AUTO: 957 THOUSANDS/UL (ref 149–390)
PMV BLD AUTO: 9.3 FL (ref 8.9–12.7)
POTASSIUM SERPL-SCNC: 3.5 MMOL/L (ref 3.5–5.3)
PROT SERPL-MCNC: 8 G/DL (ref 6.4–8.2)
RBC # BLD AUTO: 4.85 MILLION/UL (ref 3.88–5.62)
RH BLD: POSITIVE
RH BLD: POSITIVE
SODIUM SERPL-SCNC: 136 MMOL/L (ref 136–145)
SPECIMEN EXPIRATION DATE: NORMAL
WBC # BLD AUTO: 15.66 THOUSAND/UL (ref 4.31–10.16)

## 2022-02-18 PROCEDURE — 82948 REAGENT STRIP/BLOOD GLUCOSE: CPT

## 2022-02-18 PROCEDURE — 86900 BLOOD TYPING SEROLOGIC ABO: CPT | Performed by: EMERGENCY MEDICINE

## 2022-02-18 PROCEDURE — G1004 CDSM NDSC: HCPCS

## 2022-02-18 PROCEDURE — 99285 EMERGENCY DEPT VISIT HI MDM: CPT | Performed by: EMERGENCY MEDICINE

## 2022-02-18 PROCEDURE — 99223 1ST HOSP IP/OBS HIGH 75: CPT | Performed by: INTERNAL MEDICINE

## 2022-02-18 PROCEDURE — 96374 THER/PROPH/DIAG INJ IV PUSH: CPT

## 2022-02-18 PROCEDURE — 96375 TX/PRO/DX INJ NEW DRUG ADDON: CPT

## 2022-02-18 PROCEDURE — 80053 COMPREHEN METABOLIC PANEL: CPT

## 2022-02-18 PROCEDURE — 83690 ASSAY OF LIPASE: CPT

## 2022-02-18 PROCEDURE — 86850 RBC ANTIBODY SCREEN: CPT | Performed by: EMERGENCY MEDICINE

## 2022-02-18 PROCEDURE — 86901 BLOOD TYPING SEROLOGIC RH(D): CPT | Performed by: EMERGENCY MEDICINE

## 2022-02-18 PROCEDURE — 99285 EMERGENCY DEPT VISIT HI MDM: CPT

## 2022-02-18 PROCEDURE — 96361 HYDRATE IV INFUSION ADD-ON: CPT

## 2022-02-18 PROCEDURE — 36415 COLL VENOUS BLD VENIPUNCTURE: CPT

## 2022-02-18 PROCEDURE — 74177 CT ABD & PELVIS W/CONTRAST: CPT

## 2022-02-18 PROCEDURE — 85025 COMPLETE CBC W/AUTO DIFF WBC: CPT

## 2022-02-18 RX ORDER — ONDANSETRON 2 MG/ML
4 INJECTION INTRAMUSCULAR; INTRAVENOUS ONCE
Status: COMPLETED | OUTPATIENT
Start: 2022-02-18 | End: 2022-02-18

## 2022-02-18 RX ORDER — AMLODIPINE BESYLATE 5 MG/1
5 TABLET ORAL 2 TIMES DAILY
Status: DISCONTINUED | OUTPATIENT
Start: 2022-02-18 | End: 2022-02-25 | Stop reason: HOSPADM

## 2022-02-18 RX ORDER — INSULIN GLARGINE 100 [IU]/ML
15 INJECTION, SOLUTION SUBCUTANEOUS EVERY MORNING
Status: DISCONTINUED | OUTPATIENT
Start: 2022-02-19 | End: 2022-02-19

## 2022-02-18 RX ORDER — ACETAMINOPHEN 325 MG/1
650 TABLET ORAL EVERY 6 HOURS PRN
Status: DISCONTINUED | OUTPATIENT
Start: 2022-02-18 | End: 2022-02-25 | Stop reason: HOSPADM

## 2022-02-18 RX ORDER — ATORVASTATIN CALCIUM 40 MG/1
40 TABLET, FILM COATED ORAL DAILY
Status: DISCONTINUED | OUTPATIENT
Start: 2022-02-19 | End: 2022-02-25 | Stop reason: HOSPADM

## 2022-02-18 RX ORDER — ONDANSETRON 2 MG/ML
4 INJECTION INTRAMUSCULAR; INTRAVENOUS EVERY 4 HOURS PRN
Status: DISCONTINUED | OUTPATIENT
Start: 2022-02-18 | End: 2022-02-25 | Stop reason: HOSPADM

## 2022-02-18 RX ORDER — PANTOPRAZOLE SODIUM 40 MG/1
40 TABLET, DELAYED RELEASE ORAL
Status: DISCONTINUED | OUTPATIENT
Start: 2022-02-19 | End: 2022-02-25 | Stop reason: HOSPADM

## 2022-02-18 RX ORDER — HYDROMORPHONE HCL/PF 1 MG/ML
0.5 SYRINGE (ML) INJECTION EVERY 4 HOURS PRN
Status: DISCONTINUED | OUTPATIENT
Start: 2022-02-18 | End: 2022-02-25 | Stop reason: HOSPADM

## 2022-02-18 RX ORDER — ASPIRIN 81 MG/1
81 TABLET ORAL DAILY
Status: DISCONTINUED | OUTPATIENT
Start: 2022-02-19 | End: 2022-02-25 | Stop reason: HOSPADM

## 2022-02-18 RX ORDER — INSULIN GLARGINE 100 [IU]/ML
30 INJECTION, SOLUTION SUBCUTANEOUS
Status: DISCONTINUED | OUTPATIENT
Start: 2022-02-18 | End: 2022-02-18

## 2022-02-18 RX ORDER — DIPHENHYDRAMINE HYDROCHLORIDE 50 MG/ML
50 INJECTION INTRAMUSCULAR; INTRAVENOUS ONCE
Status: COMPLETED | OUTPATIENT
Start: 2022-02-18 | End: 2022-02-18

## 2022-02-18 RX ORDER — INSULIN GLARGINE 100 [IU]/ML
25 INJECTION, SOLUTION SUBCUTANEOUS EVERY MORNING
Status: DISCONTINUED | OUTPATIENT
Start: 2022-02-19 | End: 2022-02-18

## 2022-02-18 RX ORDER — LISINOPRIL 20 MG/1
40 TABLET ORAL DAILY
Status: DISCONTINUED | OUTPATIENT
Start: 2022-02-19 | End: 2022-02-21

## 2022-02-18 RX ORDER — MORPHINE SULFATE 10 MG/ML
6 INJECTION, SOLUTION INTRAMUSCULAR; INTRAVENOUS ONCE
Status: COMPLETED | OUTPATIENT
Start: 2022-02-18 | End: 2022-02-18

## 2022-02-18 RX ORDER — OXYCODONE HYDROCHLORIDE 5 MG/1
5 TABLET ORAL EVERY 4 HOURS PRN
Status: DISCONTINUED | OUTPATIENT
Start: 2022-02-18 | End: 2022-02-25 | Stop reason: HOSPADM

## 2022-02-18 RX ADMIN — ONDANSETRON 4 MG: 2 INJECTION INTRAMUSCULAR; INTRAVENOUS at 18:21

## 2022-02-18 RX ADMIN — AMLODIPINE BESYLATE 5 MG: 5 TABLET ORAL at 20:36

## 2022-02-18 RX ADMIN — IOHEXOL 100 ML: 350 INJECTION, SOLUTION INTRAVENOUS at 16:31

## 2022-02-18 RX ADMIN — MORPHINE SULFATE 6 MG: 10 INJECTION INTRAVENOUS at 18:19

## 2022-02-18 RX ADMIN — SODIUM CHLORIDE 1000 ML: 0.9 INJECTION, SOLUTION INTRAVENOUS at 16:02

## 2022-02-18 RX ADMIN — DIPHENHYDRAMINE HYDROCHLORIDE 50 MG: 50 INJECTION, SOLUTION INTRAMUSCULAR; INTRAVENOUS at 18:20

## 2022-02-19 ENCOUNTER — ANESTHESIA EVENT (INPATIENT)
Dept: PERIOP | Facility: HOSPITAL | Age: 57
DRG: 330 | End: 2022-02-19
Payer: COMMERCIAL

## 2022-02-19 ENCOUNTER — APPOINTMENT (INPATIENT)
Dept: CT IMAGING | Facility: HOSPITAL | Age: 57
DRG: 330 | End: 2022-02-19
Payer: COMMERCIAL

## 2022-02-19 PROBLEM — E87.6 HYPOKALEMIA: Status: ACTIVE | Noted: 2022-02-19

## 2022-02-19 PROBLEM — R74.01 TRANSAMINITIS: Status: ACTIVE | Noted: 2022-02-19

## 2022-02-19 PROBLEM — N18.31 STAGE 3A CHRONIC KIDNEY DISEASE (HCC): Status: ACTIVE | Noted: 2022-02-19

## 2022-02-19 LAB
ALBUMIN SERPL BCP-MCNC: 2.5 G/DL (ref 3.5–5)
ALP SERPL-CCNC: 275 U/L (ref 46–116)
ALT SERPL W P-5'-P-CCNC: 26 U/L (ref 12–78)
ANION GAP SERPL CALCULATED.3IONS-SCNC: 8 MMOL/L (ref 4–13)
AST SERPL W P-5'-P-CCNC: 49 U/L (ref 5–45)
BILIRUB SERPL-MCNC: 0.59 MG/DL (ref 0.2–1)
BUN SERPL-MCNC: 16 MG/DL (ref 5–25)
CALCIUM ALBUM COR SERPL-MCNC: 9.9 MG/DL (ref 8.3–10.1)
CALCIUM SERPL-MCNC: 8.7 MG/DL (ref 8.3–10.1)
CHLORIDE SERPL-SCNC: 99 MMOL/L (ref 100–108)
CO2 SERPL-SCNC: 27 MMOL/L (ref 21–32)
CREAT SERPL-MCNC: 1.23 MG/DL (ref 0.6–1.3)
ERYTHROCYTE [DISTWIDTH] IN BLOOD BY AUTOMATED COUNT: 18.1 % (ref 11.6–15.1)
GFR SERPL CREATININE-BSD FRML MDRD: 64 ML/MIN/1.73SQ M
GLUCOSE SERPL-MCNC: 140 MG/DL (ref 65–140)
GLUCOSE SERPL-MCNC: 194 MG/DL (ref 65–140)
GLUCOSE SERPL-MCNC: 213 MG/DL (ref 65–140)
GLUCOSE SERPL-MCNC: 64 MG/DL (ref 65–140)
GLUCOSE SERPL-MCNC: 82 MG/DL (ref 65–140)
HCT VFR BLD AUTO: 30.4 % (ref 36.5–49.3)
HGB BLD-MCNC: 9.3 G/DL (ref 12–17)
MCH RBC QN AUTO: 22.7 PG (ref 26.8–34.3)
MCHC RBC AUTO-ENTMCNC: 30.6 G/DL (ref 31.4–37.4)
MCV RBC AUTO: 74 FL (ref 82–98)
PLATELET # BLD AUTO: 739 THOUSANDS/UL (ref 149–390)
PMV BLD AUTO: 9.6 FL (ref 8.9–12.7)
POTASSIUM SERPL-SCNC: 3.3 MMOL/L (ref 3.5–5.3)
PROT SERPL-MCNC: 6.6 G/DL (ref 6.4–8.2)
RBC # BLD AUTO: 4.1 MILLION/UL (ref 3.88–5.62)
SODIUM SERPL-SCNC: 134 MMOL/L (ref 136–145)
WBC # BLD AUTO: 12.13 THOUSAND/UL (ref 4.31–10.16)

## 2022-02-19 PROCEDURE — 99232 SBSQ HOSP IP/OBS MODERATE 35: CPT | Performed by: INTERNAL MEDICINE

## 2022-02-19 PROCEDURE — 82948 REAGENT STRIP/BLOOD GLUCOSE: CPT

## 2022-02-19 PROCEDURE — 85027 COMPLETE CBC AUTOMATED: CPT | Performed by: INTERNAL MEDICINE

## 2022-02-19 PROCEDURE — 47001 NDL BIOPSY LVR TM OTH MAJ PX: CPT | Performed by: SURGERY

## 2022-02-19 PROCEDURE — 71250 CT THORAX DX C-: CPT

## 2022-02-19 PROCEDURE — 80053 COMPREHEN METABOLIC PANEL: CPT | Performed by: INTERNAL MEDICINE

## 2022-02-19 PROCEDURE — 99221 1ST HOSP IP/OBS SF/LOW 40: CPT | Performed by: SURGERY

## 2022-02-19 PROCEDURE — 99222 1ST HOSP IP/OBS MODERATE 55: CPT | Performed by: INTERNAL MEDICINE

## 2022-02-19 RX ORDER — LORAZEPAM 1 MG/1
1 TABLET ORAL ONCE
Status: COMPLETED | OUTPATIENT
Start: 2022-02-19 | End: 2022-02-19

## 2022-02-19 RX ORDER — POTASSIUM CHLORIDE 20 MEQ/1
40 TABLET, EXTENDED RELEASE ORAL ONCE
Status: COMPLETED | OUTPATIENT
Start: 2022-02-19 | End: 2022-02-19

## 2022-02-19 RX ADMIN — ASPIRIN 81 MG: 81 TABLET, COATED ORAL at 09:11

## 2022-02-19 RX ADMIN — POTASSIUM CHLORIDE 40 MEQ: 1500 TABLET, EXTENDED RELEASE ORAL at 10:21

## 2022-02-19 RX ADMIN — AMLODIPINE BESYLATE 5 MG: 5 TABLET ORAL at 09:11

## 2022-02-19 RX ADMIN — OXYCODONE HYDROCHLORIDE 5 MG: 5 TABLET ORAL at 22:34

## 2022-02-19 RX ADMIN — INSULIN LISPRO 2 UNITS: 100 INJECTION, SOLUTION INTRAVENOUS; SUBCUTANEOUS at 17:36

## 2022-02-19 RX ADMIN — AMLODIPINE BESYLATE 5 MG: 5 TABLET ORAL at 17:32

## 2022-02-19 RX ADMIN — LISINOPRIL 40 MG: 20 TABLET ORAL at 09:11

## 2022-02-19 RX ADMIN — PANTOPRAZOLE SODIUM 40 MG: 40 TABLET, DELAYED RELEASE ORAL at 17:32

## 2022-02-19 RX ADMIN — INSULIN LISPRO 2 UNITS: 100 INJECTION, SOLUTION INTRAVENOUS; SUBCUTANEOUS at 21:11

## 2022-02-19 RX ADMIN — ATORVASTATIN CALCIUM 40 MG: 40 TABLET, FILM COATED ORAL at 09:11

## 2022-02-19 RX ADMIN — LORAZEPAM 1 MG: 1 TABLET ORAL at 23:14

## 2022-02-19 RX ADMIN — PANTOPRAZOLE SODIUM 40 MG: 40 TABLET, DELAYED RELEASE ORAL at 09:11

## 2022-02-19 RX ADMIN — OXYCODONE HYDROCHLORIDE 5 MG: 5 TABLET ORAL at 03:45

## 2022-02-19 NOTE — ASSESSMENT & PLAN NOTE
62-year-old gentleman with history of diabetes mellitus and hypertension who presents to the hospital worsening abdominal pain and bowel irregularities  In the ED imaging concerning for new colon mass with hepatic metastasis  · Apple core lesion noted on imaging  · GI, Gen surgery on board  · Given obstructive quality, planned for diverting colostomy per gen surgery and will obtain bx of hepatic lesion   Tentatively planned for monday  · Oncology consulted  · On clears

## 2022-02-19 NOTE — PLAN OF CARE
Problem: Potential for Falls  Goal: Patient will remain free of falls  Description: INTERVENTIONS:  - Educate patient/family on patient safety including physical limitations  - Instruct patient to call for assistance with activity   - Consult OT/PT to assist with strengthening/mobility   - Keep Call bell within reach  - Keep bed low and locked with side rails adjusted as appropriate  - Keep care items and personal belongings within reach  - Initiate and maintain comfort rounds  - Make Fall Risk Sign visible to staff  - Offer Toileting every 2 Hours, in advance of need  - Initiate/Maintain 24alarm  - Obtain necessary fall risk management equipment:  - Apply yellow socks and bracelet for high fall risk patients  - Consider moving patient to room near nurses station  Outcome: Progressing     Problem: PAIN - ADULT  Goal: Verbalizes/displays adequate comfort level or baseline comfort level  Description: Interventions:  - Encourage patient to monitor pain and request assistance  - Assess pain using appropriate pain scale  - Administer analgesics based on type and severity of pain and evaluate response  - Implement non-pharmacological measures as appropriate and evaluate response  - Consider cultural and social influences on pain and pain management  - Notify physician/advanced practitioner if interventions unsuccessful or patient reports new pain  Outcome: Progressing     Problem: INFECTION - ADULT  Goal: Absence or prevention of progression during hospitalization  Description: INTERVENTIONS:  - Assess and monitor for signs and symptoms of infection  - Monitor lab/diagnostic results  - Monitor all insertion sites, i e  indwelling lines, tubes, and drains  - Monitor endotracheal if appropriate and nasal secretions for changes in amount and color  - [place] appropriate cooling/warming therapies per order  - Administer medications as ordered  - Instruct and encourage patient and family to use good hand hygiene technique  - Identify and instruct in appropriate isolation precautions for identified infection/condition  Outcome: Progressing  Goal: Absence of fever/infection during neutropenic period  Description: INTERVENTIONS:  - Monitor WBC    Outcome: Progressing     Problem: SAFETY ADULT  Goal: Patient will remain free of falls  Description: INTERVENTIONS:  - Educate patient/family on patient safety including physical limitations  - Instruct patient to call for assistance with activity   - Consult OT/PT to assist with strengthening/mobility   - Keep Call bell within reach  - Keep bed low and locked with side rails adjusted as appropriate  - Keep care items and personal belongings within reach  - Initiate and maintain comfort rounds  - Make Fall Risk Sign visible to staff  - Offer Toileting every 2 Hours, in advance of need  - Initiate/Maintain 24alarm  - Obtain necessary fall risk management equipment:  - Apply yellow socks and bracelet for high fall risk patients  - Consider moving patient to room near nurses station  Outcome: Progressing  Goal: Maintain or return to baseline ADL function  Description: INTERVENTIONS:  -  Assess patient's ability to carry out ADLs; assess patient's baseline for ADL function and identify physical deficits which impact ability to perform ADLs (bathing, care of mouth/teeth, toileting, grooming, dressing, etc )  - Assess/evaluate cause of self-care deficits   - Assess range of motion  - Assess patient's mobility; develop plan if impaired  - Assess patient's need for assistive devices and provide as appropriate  - Encourage maximum independence but intervene and supervise when necessary  - Involve family in performance of ADLs  - Assess for home care needs following discharge   - Consider OT consult to assist with ADL evaluation and planning for discharge  - Provide patient education as appropriate  Outcome: Progressing  Goal: Maintains/Returns to pre admission functional level  Description: INTERVENTIONS:  - Perform BMAT or MOVE assessment daily    - Set and communicate daily mobility goal to care team and patient/family/caregiver  - Collaborate with rehabilitation services on mobility goals if consulted  - Perform Range of Motion  times a day  - Reposition patient every  hours  - Dangle patient times a day  - Stand patient  times a day  - Ambulate patient  times a day  - Out of bed to chair times a day   - Out of bed for meals  times a day  - Out of bed for toileting  - Record patient progress and toleration of activity level   Outcome: Progressing     Problem: DISCHARGE PLANNING  Goal: Discharge to home or other facility with appropriate resources  Description: INTERVENTIONS:  - Identify barriers to discharge w/patient and caregiver  - Arrange for needed discharge resources and transportation as appropriate  - Identify discharge learning needs (meds, wound care, etc )  - Arrange for interpretive services to assist at discharge as needed  - Refer to Case Management Department for coordinating discharge planning if the patient needs post-hospital services based on physician/advanced practitioner order or complex needs related to functional status, cognitive ability, or social support system  Outcome: Progressing     Problem: Knowledge Deficit  Goal: Patient/family/caregiver demonstrates understanding of disease process, treatment plan, medications, and discharge instructions  Description: Complete learning assessment and assess knowledge base    Interventions:  - Provide teaching at level of understanding  - Provide teaching via preferred learning methods  Outcome: Progressing     Problem: GASTROINTESTINAL - ADULT  Goal: Minimal or absence of nausea and/or vomiting  Description: INTERVENTIONS:  - Administer IV fluids if ordered to ensure adequate hydration  - Maintain NPO status until nausea and vomiting are resolved  - Nasogastric tube if ordered  - Administer ordered antiemetic medications as needed  - Provide nonpharmacologic comfort measures as appropriate  - Advance diet as tolerated, if ordered  - Consider nutrition services referral to assist patient with adequate nutrition and appropriate food choices  Outcome: Progressing  Goal: Maintains or returns to baseline bowel function  Description: INTERVENTIONS:  - Assess bowel function  - Encourage oral fluids to ensure adequate hydration  - Administer IV fluids if ordered to ensure adequate hydration  - Administer ordered medications as needed  - Encourage mobilization and activity  - Consider nutritional services referral to assist patient with adequate nutrition and appropriate food choices  Outcome: Progressing  Goal: Maintains adequate nutritional intake  Description: INTERVENTIONS:  - Monitor percentage of each meal consumed  - Identify factors contributing to decreased intake, treat as appropriate  - Assist with meals as needed  - Monitor I&O, weight, and lab values if indicated  - Obtain nutrition services referral as needed  Outcome: Progressing  Goal: Establish and maintain optimal ostomy function  Description: INTERVENTIONS:  - Assess bowel function  - Encourage oral fluids to ensure adequate hydration  - Administer IV fluids if ordered to ensure adequate hydration   - Administer ordered medications as needed  - Encourage mobilization and activity  - Nutrition services referral to assist patient with appropriate food choices  - Assess stoma site  - Consider wound care consult   Outcome: Progressing  Goal: Oral mucous membranes remain intact  Description: INTERVENTIONS  - Assess oral mucosa and hygiene practices  - Implement preventative oral hygiene regimen  - Implement oral medicated treatments as ordered  - Initiate Nutrition services referral as needed  Outcome: Progressing     Problem: Potential for Falls  Goal: Patient will remain free of falls  Description: INTERVENTIONS:  - Educate patient/family on patient safety including physical limitations  - Instruct patient to call for assistance with activity   - Consult OT/PT to assist with strengthening/mobility   - Keep Call bell within reach  - Keep bed low and locked with side rails adjusted as appropriate  - Keep care items and personal belongings within reach  - Initiate and maintain comfort rounds  - Make Fall Risk Sign visible to staff  - Offer Toileting every 2 Hours, in advance of need  - Initiate/Maintain 24alarm  - Obtain necessary fall risk management equipment:  - Apply yellow socks and bracelet for high fall risk patients  - Consider moving patient to room near nurses station  Outcome: Progressing     Problem: PAIN - ADULT  Goal: Verbalizes/displays adequate comfort level or baseline comfort level  Description: Interventions:  - Encourage patient to monitor pain and request assistance  - Assess pain using appropriate pain scale  - Administer analgesics based on type and severity of pain and evaluate response  - Implement non-pharmacological measures as appropriate and evaluate response  - Consider cultural and social influences on pain and pain management  - Notify physician/advanced practitioner if interventions unsuccessful or patient reports new pain  Outcome: Progressing     Problem: INFECTION - ADULT  Goal: Absence or prevention of progression during hospitalization  Description: INTERVENTIONS:  - Assess and monitor for signs and symptoms of infection  - Monitor lab/diagnostic results  - Monitor all insertion sites, i e  indwelling lines, tubes, and drains  - Monitor endotracheal if appropriate and nasal secretions for changes in amount and color  - [place] appropriate cooling/warming therapies per order  - Administer medications as ordered  - Instruct and encourage patient and family to use good hand hygiene technique  - Identify and instruct in appropriate isolation precautions for identified infection/condition  Outcome: Progressing  Goal: Absence of fever/infection during neutropenic period  Description: INTERVENTIONS:  - Monitor WBC    Outcome: Progressing     Problem: SAFETY ADULT  Goal: Patient will remain free of falls  Description: INTERVENTIONS:  - Educate patient/family on patient safety including physical limitations  - Instruct patient to call for assistance with activity   - Consult OT/PT to assist with strengthening/mobility   - Keep Call bell within reach  - Keep bed low and locked with side rails adjusted as appropriate  - Keep care items and personal belongings within reach  - Initiate and maintain comfort rounds  - Make Fall Risk Sign visible to staff  - Offer Toileting every 2 Hours, in advance of need  - Initiate/Maintain 24alarm  - Obtain necessary fall risk management equipment:  - Apply yellow socks and bracelet for high fall risk patients  - Consider moving patient to room near nurses station  Outcome: Progressing  Goal: Maintain or return to baseline ADL function  Description: INTERVENTIONS:  -  Assess patient's ability to carry out ADLs; assess patient's baseline for ADL function and identify physical deficits which impact ability to perform ADLs (bathing, care of mouth/teeth, toileting, grooming, dressing, etc )  - Assess/evaluate cause of self-care deficits   - Assess range of motion  - Assess patient's mobility; develop plan if impaired  - Assess patient's need for assistive devices and provide as appropriate  - Encourage maximum independence but intervene and supervise when necessary  - Involve family in performance of ADLs  - Assess for home care needs following discharge   - Consider OT consult to assist with ADL evaluation and planning for discharge  - Provide patient education as appropriate  Outcome: Progressing  Goal: Maintains/Returns to pre admission functional level  Description: INTERVENTIONS:  - Perform BMAT or MOVE assessment daily    - Set and communicate daily mobility goal to care team and patient/family/caregiver     - Collaborate with rehabilitation services on mobility goals if consulted  - Perform Range of Motion  times a day  - Reposition patient every  hours  - Dangle patient  times a day  - Stand patient times a day  - Ambulate patient  times a day  - Out of bed to chair  times a day   - Out of bed for meal  times a day  - Out of bed for toileting  - Record patient progress and toleration of activity level   Outcome: Progressing     Problem: DISCHARGE PLANNING  Goal: Discharge to home or other facility with appropriate resources  Description: INTERVENTIONS:  - Identify barriers to discharge w/patient and caregiver  - Arrange for needed discharge resources and transportation as appropriate  - Identify discharge learning needs (meds, wound care, etc )  - Arrange for interpretive services to assist at discharge as needed  - Refer to Case Management Department for coordinating discharge planning if the patient needs post-hospital services based on physician/advanced practitioner order or complex needs related to functional status, cognitive ability, or social support system  Outcome: Progressing     Problem: Knowledge Deficit  Goal: Patient/family/caregiver demonstrates understanding of disease process, treatment plan, medications, and discharge instructions  Description: Complete learning assessment and assess knowledge base    Interventions:  - Provide teaching at level of understanding  - Provide teaching via preferred learning methods  Outcome: Progressing     Problem: GASTROINTESTINAL - ADULT  Goal: Minimal or absence of nausea and/or vomiting  Description: INTERVENTIONS:  - Administer IV fluids if ordered to ensure adequate hydration  - Maintain NPO status until nausea and vomiting are resolved  - Nasogastric tube if ordered  - Administer ordered antiemetic medications as needed  - Provide nonpharmacologic comfort measures as appropriate  - Advance diet as tolerated, if ordered  - Consider nutrition services referral to assist patient with adequate nutrition and appropriate food choices  Outcome: Progressing  Goal: Maintains or returns to baseline bowel function  Description: INTERVENTIONS:  - Assess bowel function  - Encourage oral fluids to ensure adequate hydration  - Administer IV fluids if ordered to ensure adequate hydration  - Administer ordered medications as needed  - Encourage mobilization and activity  - Consider nutritional services referral to assist patient with adequate nutrition and appropriate food choices  Outcome: Progressing  Goal: Maintains adequate nutritional intake  Description: INTERVENTIONS:  - Monitor percentage of each meal consumed  - Identify factors contributing to decreased intake, treat as appropriate  - Assist with meals as needed  - Monitor I&O, weight, and lab values if indicated  - Obtain nutrition services referral as needed  Outcome: Progressing  Goal: Establish and maintain optimal ostomy function  Description: INTERVENTIONS:  - Assess bowel function  - Encourage oral fluids to ensure adequate hydration  - Administer IV fluids if ordered tactivity  - Nutrition services referral to assist patient with appropriate food choices  - Assess stoma site  - Consider wound care consult   Outcome: Progressing  Goal: Oral mucous membranes remain intact  Description: INTERVENTIONS  - Assess oral mucosa and hygiene practices  - Implement preventative oral hygiene regimen  - Implement oral medicated treatments as ordered  - Initiate Nutrition services referral as needed  Outcome: Progressing

## 2022-02-19 NOTE — ASSESSMENT & PLAN NOTE
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

## 2022-02-19 NOTE — ED PROVIDER NOTES
History  Chief Complaint   Patient presents with    Abdominal Pain     c/o abd pain and vomiting  hx gastroparesis  BM today was "black" no active vomiting  77-year-old male patient with history of insulin-dependent diabetes presents emergency department for evaluation syncopal episodes not today but in the past week or two, progressively worsening black tarry stool, abdominal discomfort with a history of diabetic gastroparesis  On physical exam the patient has more full in the right upper quadrant but is not tender in the right upper quadrant  Patient is lying in bed, no apparent distress, otherwise well in appearance  Plans for evaluation with a differential diagnosis includes was not limited to GI bleeding, intra-abdominal neoplasm, constipation  I reviewed in depth the patient's CT scan findings concerning for a primary colonic tumor with metastatic disease to the liver  I discussed this with his brother, who works as a neurologist in [facility] as well  History provided by:  Patient   used: No    Abdominal Pain  Pain location:  RUQ  Pain quality: aching    Pain radiates to:  Does not radiate  Onset quality:  Gradual  Timing:  Constant  Progression:  Worsening  Chronicity:  New  Relieved by:  Nothing  Worsened by:  Nothing  Ineffective treatments:  None tried  Associated symptoms: no chest pain, no hematemesis, no hematuria and no vomiting        Prior to Admission Medications   Prescriptions Last Dose Informant Patient Reported? Taking?    B-D UF III MINI PEN NEEDLES 31G X 5 MM MISC  Self No No   Sig: BY DOES NOT APPLY ROUTE 4 (FOUR) TIMES A DAY   HumaLOG KwikPen 100 units/mL injection pen   No No   Sig: Please specify directions, refills and quantity   Needle, Disp, (HYPODERMIC NEEDLE) 23G X 1-1/2" MISC  Self No No   Sig: by Does not apply route once a week   amLODIPine (NORVASC) 5 mg tablet   No No   Sig: TAKE 1 TABLET BY MOUTH TWICE A DAY   aspirin 81 MG tablet Self Yes No   Sig: Take 81 mg by mouth daily  atorvastatin (LIPITOR) 40 mg tablet   No No   Sig: Take 1 tablet (40 mg total) by mouth daily   clobetasol (TEMOVATE) 0 05 % ointment   No No   Sig: Apply topically 2 (two) times a day   glyBURIDE-metFORMIN (GLUCOVANCE) 5-500 MG per tablet   No No   Sig: TAKE 2 TABLETS BY MOUTH TWICE A DAY   insulin glargine (Lantus SoloStar) 100 units/mL injection pen   No No   Sig: Inject 30 Units under the skin daily   lisinopril (ZESTRIL) 40 mg tablet   No No   Sig: Take 1 tablet (40 mg total) by mouth daily   loratadine-pseudoephedrine (CLARITIN-D 24-HOUR)  mg per 24 hr tablet  Self No No   Sig: Take 1 tablet by mouth daily   Patient taking differently: Take 1 tablet by mouth daily    metoclopramide (REGLAN) 10 mg tablet   No No   Sig: TAKE 1 TABLET BY MOUTH TWICE A DAY   pantoprazole (PROTONIX) 40 mg tablet   No No   Sig: Take 1 tablet (40 mg total) by mouth 2 (two) times a day   sildenafil (VIAGRA) 100 mg tablet  Self No No   Sig: Take 1 tablet (100 mg total) by mouth daily as needed for erectile dysfunction for up to 60 doses 1 hour prior to needing on an empty stomach   sitaGLIPtin (JANUVIA) 100 mg tablet   No No   Sig: Take 1 tablet (100 mg total) by mouth daily      Facility-Administered Medications: None       Past Medical History:   Diagnosis Date    Acute renal failure ([place] [address] )     [id number] resolved    Diabetes mellitus ([place] [address] )     Enteritis 8/23/2016    Gastroparesis [facility] to DM ([facility] ) 8/23/2016    GERD (gastroesophageal reflux disease)     Hernia, ventral 8/4/2016    HLD (hyperlipidemia) 8/23/2016    HTN (hypertension), benign 8/23/2016    Hyperlipidemia     Hypertension     Intractable vomiting with nausea 8/23/2016       Past Surgical History:   Procedure Laterality Date    ESOPHAGOGASTRODUODENOSCOPY N/A 8/24/2016    Procedure: ESOPHAGOGASTRODUODENOSCOPY (EGD); Surgeon: Shad Leach MD;  [facility];   Service:     KIDNEY STONE SURGERY      TONSILLECTOMY      UMBILICAL HERNIA REPAIR      UMBILICAL HERNIA REPAIR LAPAROSCOPIC N/A 4/26/2019    Procedure: LAPAROSCOPIC UMBILICAL HERNIA REPAIR;  Surgeon: Nan Oh MD;  Location: [facility];  Service: General       Family History   Problem Relation Age of Onset    Diabetes Mother         mellitus    Lung cancer Mother     Coronary artery disease Father      I have reviewed and agree with the history as documented  E-Cigarette/Vaping    E-Cigarette Use Never User      E-Cigarette/Vaping Substances    Nicotine No     THC No     CBD No     Flavoring No     Other No     Unknown No      Social History     Tobacco Use    Smoking status: Never Smoker    Smokeless tobacco: Never Used   Vaping Use    Vaping Use: Never used   Substance Use Topics    Alcohol use: Yes     Comment: occasion    Drug use: No       Review of Systems   Cardiovascular: Negative for chest pain  Gastrointestinal: Positive for abdominal pain  Negative for hematemesis and vomiting  Genitourinary: Negative for hematuria  All other systems reviewed and are negative  Physical Exam  Physical Exam  Vitals and nursing note reviewed  Constitutional:       General: He is not in acute distress  Appearance: He is well-developed  He is not diaphoretic  HENT:      Head: Normocephalic and atraumatic  Right Ear: External ear normal       Left Ear: External ear normal    Eyes:      General: No scleral icterus  Right eye: No discharge  Left eye: No discharge  Conjunctiva/sclera: Conjunctivae normal    Neck:      Thyroid: No thyromegaly  Vascular: No JVD  Trachea: No tracheal deviation  Cardiovascular:      Rate and Rhythm: Normal rate and regular rhythm  Pulmonary:      Effort: Pulmonary effort is normal  No respiratory distress  Breath sounds: Normal breath sounds  No stridor  No wheezing or rales  Abdominal:      General: Bowel sounds are normal  There is no distension  Palpations: Abdomen is soft  Tenderness: There is abdominal tenderness in the right upper quadrant  Musculoskeletal:         General: No tenderness or deformity  Normal range of motion  Cervical back: Normal range of motion and neck supple  Skin:     General: Skin is warm and dry  Neurological:      Mental Status: He is alert and oriented to person, place, and time  Cranial Nerves: No cranial nerve deficit        Coordination: Coordination normal    Psychiatric:         Behavior: Behavior normal          Vital Signs  ED Triage Vitals [02/18/22 1508]   Temperature Pulse Respirations Blood Pressure SpO2   97 5 °F (36 4 °C) 100 19 143/80 98 %      Temp src Heart Rate Source Patient Position - Orthostatic VS BP Location FiO2 (%)   -- -- -- -- --      Pain Score       3           Vitals:    02/18/22 1530 02/18/22 1600 02/18/22 1730 02/18/22 1800   BP: 140/88 148/95 160/81 168/87   Pulse: 91 95 97 96         Visual Acuity      ED Medications  Medications   sodium chloride 0 9 % bolus 1,000 mL (0 mL Intravenous Stopped 2/18/22 1811)   iohexol (OMNIPAQUE) 350 MG/ML injection (SINGLE-DOSE) 100 mL (100 mL Intravenous Given 2/18/22 1631)   morphine (PF) 10 mg/mL injection 6 mg (6 mg Intravenous Given 2/18/22 1819)   ondansetron (ZOFRAN) injection 4 mg (4 mg Intravenous Given 2/18/22 1821)   diphenhydrAMINE (BENADRYL) injection 50 mg (50 mg Intravenous Given 2/18/22 1820)       Diagnostic Studies  Results Reviewed     Procedure Component Value Units Date/Time    Comprehensive metabolic panel [[id number]]  (Abnormal) Collected: 02/18/22 1522    Lab Status: Final result Specimen: Blood from Arm, Right Updated: 02/18/22 1544     Sodium 136 mmol/L      Potassium 3 5 mmol/L      Chloride 98 mmol/L      CO2 26 mmol/L      ANION GAP 12 mmol/L      BUN 22 mg/dL      Creatinine 1 57 mg/dL      Glucose 115 mg/dL      Calcium 9 1 mg/dL      Corrected Calcium 9 9 mg/dL      AST 67 U/L      ALT 37 U/L      Alkaline Phosphatase 349 U/L      Total Protein 8 0 g/dL      Albumin 3 0 g/dL      Total Bilirubin 0 73 mg/dL      eGFR 48 ml/min/1 73sq m     Narrative:      National Kidney Disease Foundation guidelines for Chronic Kidney Disease (CKD):     Stage 1 with normal or high GFR (GFR > 90 mL/min/1 73 square meters)    Stage 2 Mild CKD (GFR = 60-89 mL/min/1 73 square meters)    Stage 3A Moderate CKD (GFR = 45-59 mL/min/1 73 square meters)    Stage 3B Moderate CKD (GFR = 30-44 mL/min/1 73 square meters)    Stage 4 Severe CKD (GFR = 15-29 mL/min/1 73 square meters)    Stage 5 End Stage CKD (GFR <15 mL/min/1 73 square meters)  Note: GFR calculation is accurate only with a steady state creatinine    Lipase [[id number]]  (Normal) Collected: 02/18/22 1522    Lab Status: Final result Specimen: Blood from Arm, Right Updated: 02/18/22 1544     Lipase 195 u/L     CBC and differential [[id number]]  (Abnormal) Collected: 02/18/22 1522    Lab Status: Final result Specimen: Blood from Arm, Right Updated: 02/18/22 1526     WBC 15 66 Thousand/uL      RBC 4 85 Million/uL      Hemoglobin 10 9 g/dL      Hematocrit 35 8 %      MCV 74 fL      MCH 22 5 pg      MCHC 30 4 g/dL      RDW 18 7 %      MPV 9 3 fL      Platelets 599 Thousands/uL      nRBC 0 /100 WBCs      Neutrophils Relative 69 %      Immat GRANS % 1 %      Lymphocytes Relative 18 %      Monocytes Relative 11 %      Eosinophils Relative 0 %      Basophils Relative 1 %      Neutrophils Absolute 10 99 Thousands/µL      Immature Grans Absolute 0 08 Thousand/uL      Lymphocytes Absolute 2 86 Thousands/µL      Monocytes Absolute 1 64 Thousand/µL      Eosinophils Absolute 0 00 Thousand/µL      Basophils Absolute 0 09 Thousands/µL                  CT abdomen pelvis with contrast   Final Result by Silvio Mulligan MD (02/18 1703)      1  Distal transverse colonic apple core lesion causing a degree of obstruction with dilated upstream colon with mild surrounding fat stranding    Small foci of gas peripheral to colonic stool may represent trapped air; however, pneumatosis cannot be    entirely excluded  2   Innumerable hepatic metastases  3   Nonenlarged mesenteric lymph nodes adjacent to the colonic mass, concerning for metastatic disease, given their location  The study was marked in [facility] for immediate notification  Workstation performed: [id number]                    Procedures  Procedures         ED Course  ED Course as of 02/18/22 1932   Fri Feb 18, 2022   1637 Black stool  Only trace heme positive  SBIRT 22yo+      Most Recent Value   SBIRT (22 yo +)    In order to provide better care to our patients, we are screening all of our patients for alcohol and drug use  Would it be okay to ask you these screening questions? Yes Filed at: 02/18/2022 1529   Initial Alcohol Screen: US AUDIT-C     1  How often do you have a drink containing alcohol? 0 Filed at: 02/18/2022 1529   2  How many drinks containing alcohol do you have on a typical day you are drinking? 0 Filed at: 02/18/2022 1529   3a  Male UNDER 65: How often do you have five or more drinks on one occasion? 0 Filed at: 02/18/2022 1529   3b  FEMALE Any Age, or MALE 65+: How often do you have 4 or more drinks on one occassion? 0 Filed at: 02/18/2022 1529   Audit-C Score 0 Filed at: 02/18/2022 1529   XIAO: How many times in the past year have you    Used an illegal drug or used a prescription medication for non-medical reasons?  Never Filed at: 02/18/2022 1529                    MDM  Number of Diagnoses or Management Options  Colonic mass: new and requires workup     Amount and/or Complexity of Data Reviewed  Clinical lab tests: ordered and reviewed  Tests in the radiology section of CPT®: ordered and reviewed  Decide to obtain previous medical records or to obtain history from someone other than the patient: yes  Review and summarize past medical records: yes    Patient Progress  Patient progress: stable      Disposition  Final diagnoses:   Colonic mass     Time reflects when diagnosis was documented in both MDM as applicable and the Disposition within this note     Time User Action Codes Description Comment    2/18/2022  6:28 PM Giovannatracy  Add [K63 89] Colonic mass       ED Disposition     ED Disposition Condition Date/Time Comment    Admit Stable Fri Feb 18, 2022  6:27 PM Case was discussed with Dr Sravanthi Rondon and the patient's admission status was agreed to be Admission Status: inpatient status to the service of Dr Sravanthi Rondon   Follow-up Information    None         Patient's Medications   Discharge Prescriptions    No medications on file       No discharge procedures on file      PDMP Review     None          ED Provider  Electronically Signed by           Fani Newman DO  02/18/22 3842

## 2022-02-19 NOTE — UTILIZATION REVIEW
Initial Clinical Review    Admission: Date/Time/Statement:   Admission Orders (From admission, onward)     Ordered        02/18/22 Nirali Ordoñez 1  Inpatient Admission  Once                      Orders Placed This Encounter   Procedures    Inpatient Admission     Standing Status:   Standing     Number of Occurrences:   1     Order Specific Question:   Level of Care     Answer:   Med Surg [16]     Order Specific Question:   Estimated length of stay     Answer:   More than 2 Midnights     Order Specific Question:   Certification     Answer:   I certify that inpatient services are medically necessary for this patient for a duration of greater than two midnights  See H&P and MD Progress Notes for additional information about the patient's course of treatment  ED Arrival Information     Expected Arrival Acuity    - 2/18/2022 14:55 Urgent         Means of arrival Escorted by Service Admission type    Walk-In Self Hospitalist Urgent         Arrival complaint    Abdominal Pain        Chief Complaint   Patient presents with    Abdominal Pain     c/o abd pain and vomiting  hx gastroparesis  BM today was "black" no active vomiting  Initial Presentation: 62 yo male to ED from home w/ worsening abd pain   Has noticed worsening dark stools and adb discomfort   Pain sharp and stabbing , worsening constipation   CT revealed lesion on colon w/ suspicion for malignancy w/ liver mets  Hx of 20 lb weight loss  Pt never had a colonoscopy   PMHX DM and HTN   Admitted IP status for colonic mass plan for clear liq diet , consult GI , surgery and oncology   Anemia recheck hgb in am   DM cont glargine and add SSI   HTN cont lisinopril and amlodipine  Date: 2/19   Day 2: Bx planned for Monday   Oncology and surgery following    Cont w/ abd tenderness       2/19 General surgery consult   Plan for diagnostic lap liver biopsy and diverting loop transverse colostomy tomorrow    2/19 GI Consult   If biopsy is not suggestive of metastatic colon cancer then he will require a colonoscopy to biopsy the colon lesion  If he has evidence of worsening significant bleeding or anemia, we will plan for colonoscopy     ED Triage Vitals   Temperature Pulse Respirations Blood Pressure SpO2   02/18/22 1508 02/18/22 1508 02/18/22 1508 02/18/22 1508 02/18/22 1508   97 5 °F (36 4 °C) 100 19 143/80 98 %      Temp Source Heart Rate Source Patient Position - Orthostatic VS BP Location FiO2 (%)   02/18/22 2033 02/18/22 2033 -- 02/18/22 2033 --   Oral Monitor  Left arm       Pain Score       02/18/22 1508       3          Wt Readings from Last 1 Encounters:   02/18/22 133 kg (293 lb 3 4 oz)     Additional Vital Signs:   02/18/22 23:20:54 98 8 °F (37 1 °C) 88 18 162/88 113 95 % --   02/18/22 2036 -- -- -- 170/74 -- -- --   02/18/22 20:33:03 98 2 °F (36 8 °C) 91 18 173/90 Abnormal   118 95 % None (Room air)   02/18/22 1800 -- 96 18 168/87 120 95 % --   02/18/22 1730 -- 97 18 160/81 113 97 % --   02/18/22 1600 -- 95 18 148/95 117 97 % --   02/18/22 1530 -- 91 18 140/88 108 97 % --       Pertinent Labs/Diagnostic Test Results:   2/18 CT abd 1  Distal transverse colonic apple core lesion causing a degree of obstruction with dilated upstream colon with mild surrounding fat stranding    Small foci of gas peripheral to colonic stool may represent trapped air; however, pneumatosis cannot be   entirely excluded    2   Innumerable hepatic metastases    3   Nonenlarged mesenteric lymph nodes adjacent to the colonic mass, concerning for metastatic disease, given their location        Results from last 7 days   Lab Units 02/19/22  0500 02/18/22  1522   WBC Thousand/uL 12 13* 15 66*   HEMOGLOBIN g/dL 9 3* 10 9*   HEMATOCRIT % 30 4* 35 8*   PLATELETS Thousands/uL 739* 957*   NEUTROS ABS Thousands/µL  --  10 99*     Results from last 7 days   Lab Units 02/19/22  0500 02/18/22  1522   SODIUM mmol/L 134* 136   POTASSIUM mmol/L 3 3* 3 5   CHLORIDE mmol/L 99* 98*   CO2 mmol/L 27 26   ANION GAP mmol/L 8 12   BUN mg/dL 16 22   CREATININE mg/dL 1 23 1 57*   EGFR ml/min/1 73sq m 64 48   CALCIUM mg/dL 8 7 9 1     Results from last 7 days   Lab Units 02/19/22  0500 02/18/22  1522   AST U/L 49* 67*   ALT U/L 26 37   ALK PHOS U/L 275* 349*   TOTAL PROTEIN g/dL 6 6 8 0   ALBUMIN g/dL 2 5* 3 0*   TOTAL BILIRUBIN mg/dL 0 59 0 73     Results from last 7 days   Lab Units 02/19/22  0742 02/18/22  2255 02/18/22  2114 02/18/22  2053   POC GLUCOSE mg/dl 82 145* 81 57*     Results from last 7 days   Lab Units 02/19/22  0500 02/18/22  1522   GLUCOSE RANDOM mg/dL 64* 115       Results from last 7 days   Lab Units 02/18/22  1522   LIPASE u/L 195     ED Treatment:   Medication Administration from 02/18/2022 1455 to 02/18/2022 2019       Date/Time Order Dose Route Action     02/18/2022 1602 sodium chloride 0 9 % bolus 1,000 mL 1,000 mL Intravenous New Bag     02/18/2022 1819 morphine (PF) 10 mg/mL injection 6 mg 6 mg Intravenous Given     02/18/2022 1821 ondansetron (ZOFRAN) injection 4 mg 4 mg Intravenous Given     02/18/2022 1820 diphenhydrAMINE (BENADRYL) injection 50 mg 50 mg Intravenous Given        Past Medical History:   Diagnosis Date    Acute renal failure ([place] [address] )     [id number] resolved    Diabetes mellitus ([place] [address] )     Enteritis 08/23/2016    Gastroparesis due to DM ([place] [address] ) 08/23/2016    GERD (gastroesophageal reflux disease)     Hernia, ventral 08/04/2016    Hyperlipidemia     Hypertension      Present on Admission:   Benign essential hypertension   Type 2 diabetes mellitus with hyperlipidemia (HCC)   Colonic mass   Morbid obesity ([place] [address] )   Mixed hyperlipidemia   Microcytic anemia      Admitting Diagnosis: Abdominal pain [R10 9]  Colonic mass [K63 89]  Age/Sex: 61 y o  male  Admission Orders:  Scheduled Medications:  amLODIPine, 5 mg, Oral, BID  aspirin, 81 mg, Oral, Daily  atorvastatin, 40 mg, Oral, Daily  insulin glargine, 15 Units, Subcutaneous, QAM  insulin lispro, 1-6 Units, Subcutaneous, 4x Daily (AC & HS)  lisinopril, 40 mg, Oral, Daily  pantoprazole, 40 mg, Oral, BID AC  potassium chloride, 40 mEq, Oral, Once      Continuous IV Infusions:     PRN Meds:  acetaminophen, 650 mg, Oral, Q6H PRN  HYDROmorphone, 0 5 mg, Intravenous, Q4H PRN  ondansetron, 4 mg, Intravenous, Q4H PRN  oxyCODONE, 5 mg, Oral, Q4H PRN    Fingerstick ac and hs     IP CONSULT TO GASTROENTEROLOGY  IP CONSULT TO ACUTE CARE SURGERY  IP CONSULT TO HEMATOLOGY    Network Utilization Review Department  ATTENTION: Please call with any questions or concerns to [phone] and carefully listen to the prompts so that you are directed to the right person  All voicemails are confidential   Vika Hernadez all requests for admission clinical reviews, approved or denied determinations and any other requests to dedicated fax number below belonging to the campus where the patient is receiving treatment   List of dedicated fax numbers for the Facilities:  [address] DENIALS (Administrative/Medical Necessity) [phone]   [address] (Maternity/NICU/Pediatrics) [phone]   [address] [place] [address] [address] [place] [address]  [address] [facility] [place] [address] [address] [place] [address] Cristel Gant [address] [facility] [phone]

## 2022-02-19 NOTE — ASSESSMENT & PLAN NOTE
Lab Results   Component Value Date    EGFR 64 02/19/2022    EGFR 48 02/18/2022    EGFR 45 01/04/2020    CREATININE 1 23 02/19/2022    CREATININE 1 57 (H) 02/18/2022    CREATININE 1 31 (H) 08/26/2021   · Cr w in baseline range

## 2022-02-19 NOTE — H&P
[address] [facility]  [facility] 1965, 61 [facility]  [facility] MRN: [id number]  Unit/Bed#: ED 06 Encounter: [id number]  Primary Care Provider: Ana Senior MD   Date and time admitted to hospital: 2/18/2022  3:09 PM    Assessment and Plan  * Colonic mass  Assessment & Plan  79-year-old gentleman with history of diabetes mellitus and hypertension who presents to the hospital worsening abdominal pain and bowel irregularities  In the ED imaging concerning for new colon mass with hepatic metastasis  · Apple core lesion noted on imaging  · Clear liquid diet for now  · Consult gastroenterology, surgery, and oncology    Microcytic anemia  Assessment & Plan  · Microcytic anemia likely secondary to GI blood loss  · Recheck hemoglobin in a m  Results from last 7 days   Lab Units 02/18/22  1522   HEMOGLOBIN g/dL 10 9*       Type 2 diabetes mellitus with hyperlipidemia [facility])  Assessment & Plan  Lab Results   Component Value Date    HGBA1C 7 5 (H) 02/10/2022       No results for input(s): POCGLU in the last 72 hours  · During hospitalization hold oral agents  · Continue glargine 30 units  Add sliding scale during hospitalization  Morbid obesity ([address] )  Assessment & Plan  Estimated body mass index is 41 98 kg/m² as calculated from the following:    Height as of 3/23/21: 5' 10" (1 778 m)  Weight as of 3/23/21: 133 kg (292 lb 9 6 oz)  Mixed hyperlipidemia  Assessment & Plan  · Continue atorvastatin    Benign essential hypertension  Assessment & Plan  · Continue lisinopril and amlodipine      VTE Prophylaxis: Low risk  Code Status: Level 1 - Full Code  Anticipated Length of Stay:  Patient will be admitted on an Inpatient basis with an anticipated length of stay of  greater than 2 midnights  Justification for Hospital Stay: Colonic mass  Total Time for Visit, including Counseling / Coordination of Care: xx mins   Greater than 50% of this total time spent on direct patient counseling and coordination of care  Chief Complaint:     Abdominal Pain (c/o abd pain and vomiting  hx gastroparesis  BM today was "black" no active vomiting  )    History of Present Illness:    Darrion Grant is a 61 y o  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 of diabetes mellitus hypertension and hyperlipidemia who presents with worsening abdominal pain  The patient's most recent performance has been in [facility]  Over the past week the patient has noted progressively worsening dark stools and abdominal discomfort  Pain is sharp stabbing and he has noted worsening constipation  He drove back and came to the emergency department where he has been found to have lesion on colon suspicious for malignancy with liver metastasis  Upon further questioning he has not had colonoscopy  He has had 20 lb weight loss  Review of Systems:  Review of Systems   Constitutional: Positive for unexpected weight change (20 lb weight loss)  Negative for chills, diaphoresis and fever  HENT: Negative for dental problem and facial swelling  Eyes: Negative for visual disturbance  Respiratory: Negative for shortness of breath  Cardiovascular: Negative for chest pain  Gastrointestinal: Positive for abdominal pain and constipation  Negative for abdominal distention, diarrhea, nausea and vomiting  Genitourinary: Negative for dysuria and hematuria  Musculoskeletal: Negative for back pain  Skin: Negative for rash  Neurological: Negative for seizures and speech difficulty  Psychiatric/Behavioral: Negative for agitation  The patient is not nervous/anxious  All other systems reviewed and are negative          Past Medical and Surgical History:   Past Medical History:   Diagnosis Date    Acute renal failure ([facility] )     [id number] resolved    Diabetes mellitus ([facility] )     Enteritis 08/23/2016    Gastroparesis due to DM ([facility] ) 08/23/2016    GERD (gastroesophageal reflux disease)     Hernia, ventral 08/04/2016  Hyperlipidemia     Hypertension      Past Surgical History:   Procedure Laterality Date    ESOPHAGOGASTRODUODENOSCOPY N/A 8/24/2016    Procedure: ESOPHAGOGASTRODUODENOSCOPY (EGD); Surgeon: Peace Gutierrez MD;  [facility]; Service:     KIDNEY STONE SURGERY      TONSILLECTOMY      UMBILICAL HERNIA REPAIR      UMBILICAL HERNIA REPAIR LAPAROSCOPIC N/A 4/26/2019    Procedure: LAPAROSCOPIC UMBILICAL HERNIA REPAIR;  Surgeon: Linette Fish MD;  Location: [facility];  Service: General     Meds/Allergies: Allergies: Allergies   Allergen Reactions    Shellfish-Derived Products - Food Allergy Anaphylaxis    Erythromycin GI Intolerance     Pt denies     Prior to Admission Medications   Prescriptions Last Dose Informant Patient Reported? Taking? B-D UF III MINI PEN NEEDLES 31G X 5 MM MISC  Self No No   Sig: BY DOES NOT APPLY ROUTE 4 (FOUR) TIMES A DAY   Needle, Disp, (HYPODERMIC NEEDLE) 23G X 1-1/2" MISC  Self No No   Sig: by Does not apply route once a week   amLODIPine (NORVASC) 5 mg tablet   No No   Sig: TAKE 1 TABLET BY MOUTH TWICE A DAY   aspirin 81 MG tablet  Self Yes No   Sig: Take 81 mg by mouth daily     atorvastatin (LIPITOR) 40 mg tablet   No No   Sig: Take 1 tablet (40 mg total) by mouth daily   glyBURIDE-metFORMIN (GLUCOVANCE) 5-500 MG per tablet   No No   Sig: TAKE 2 TABLETS BY MOUTH TWICE A DAY   insulin glargine (Lantus SoloStar) 100 units/mL injection pen   No No   Sig: Inject 30 Units under the skin daily   lisinopril (ZESTRIL) 40 mg tablet   No No   Sig: Take 1 tablet (40 mg total) by mouth daily   metoclopramide (REGLAN) 10 mg tablet   No No   Sig: TAKE 1 TABLET BY MOUTH TWICE A DAY   pantoprazole (PROTONIX) 40 mg tablet   No No   Sig: Take 1 tablet (40 mg total) by mouth 2 (two) times a day   sildenafil (VIAGRA) 100 mg tablet  Self No No   Sig: Take 1 tablet (100 mg total) by mouth daily as needed for erectile dysfunction for up to 60 doses 1 hour prior to needing on an empty stomach   sitaGLIPtin (JANUVIA) 100 mg tablet   No No   Sig: Take 1 tablet (100 mg total) by mouth daily      Facility-Administered Medications: None     Social History:     Social History     Socioeconomic History    Marital status: /Civil Union     Spouse name: Not on file    Number of children: Not on file    Years of education: Not on file    Highest education level: Not on file   Occupational History    Occupation: ACTOR     [employer]er: [employer]   Tobacco Use    Smoking status: Never Smoker    Smokeless tobacco: Never Used   Vaping Use    Vaping Use: Never used   Substance and Sexual Activity    Alcohol use: Yes     Comment: occasion    Drug use: No    Sexual activity: Yes     Partners: Female   Other Topics Concern    Not on file   Social History Narrative    Not on file     Social Determinants of Health     Financial Resource Strain: Not on file   Food Insecurity: Not on file   Transportation Needs: Not on file   Physical Activity: Sufficiently Active    Days of Exercise per Week: 7 days    Minutes of Exercise per Session: 60 min   Stress: No Stress Concern Present    Feeling of Stress : Not at all   Social Connections: Not on file   Intimate Partner Violence: Not on file   Housing Stability: Not on file     Patient Pre-hospital Living Situation:   Patient Pre-hospital Level of Mobility:   Patient Pre-hospital Diet Restrictions:     Family History:  Family History   Problem Relation Age of Onset    Diabetes Mother         mellitus    Lung cancer Mother     Coronary artery disease Father      Physical Exam:   Vitals:   Blood Pressure: 168/87 (02/18/22 1800)  Pulse: 96 (02/18/22 1800)  Temperature: 97 5 °F (36 4 °C) (02/18/22 1508)  Respirations: 18 (02/18/22 1800)  SpO2: 95 % (02/18/22 1800)    Physical Exam  Vitals reviewed  Constitutional:       General: He is not in acute distress  HENT:      Head: Atraumatic     Eyes:      Extraocular Movements: Extraocular movements intact  Pupils: Pupils are equal, round, and reactive to light  Cardiovascular:      Rate and Rhythm: Normal rate and regular rhythm  Heart sounds: Normal heart sounds  Pulmonary:      Effort: Pulmonary effort is normal       Breath sounds: No wheezing  Abdominal:      General: Bowel sounds are normal       Palpations: Abdomen is soft  Tenderness: There is no abdominal tenderness  There is no rebound  Musculoskeletal:         General: No swelling or tenderness  Cervical back: Normal range of motion  Skin:     General: Skin is warm and dry  Neurological:      General: No focal deficit present  Mental Status: He is alert and oriented to person, place, and time  Cranial Nerves: No cranial nerve deficit  Psychiatric:         Mood and Affect: Mood normal        Lab Results: I have personally reviewed pertinent reports  Results from last 7 days   Lab Units 02/18/22  1522   WBC Thousand/uL 15 66*   HEMOGLOBIN g/dL 10 9*   HEMATOCRIT % 35 8*   PLATELETS Thousands/uL 957*   NEUTROS PCT % 69   LYMPHS PCT % 18   MONOS PCT % 11   EOS PCT % 0     Results from last 7 days   Lab Units 02/18/22  1522   SODIUM mmol/L 136   POTASSIUM mmol/L 3 5   CHLORIDE mmol/L 98*   CO2 mmol/L 26   ANION GAP mmol/L 12   BUN mg/dL 22   CREATININE mg/dL 1 57*   CALCIUM mg/dL 9 1   ALBUMIN g/dL 3 0*   TOTAL BILIRUBIN mg/dL 0 73   ALK PHOS U/L 349*   ALT U/L 37   AST U/L 67*   EGFR ml/min/1 73sq m 48   GLUCOSE RANDOM mg/dL 115       Imaging: I have personally reviewed pertinent films in PACS  CT abdomen pelvis with contrast    Result Date: 2/18/2022  Impression: 1  Distal transverse colonic apple core lesion causing a degree of obstruction with dilated upstream colon with mild surrounding fat stranding  Small foci of gas peripheral to colonic stool may represent trapped air; however, pneumatosis cannot be entirely excluded  2   Innumerable hepatic metastases   3   Nonenlarged mesenteric lymph nodes adjacent to the colonic mass, concerning for metastatic disease, given their location  The study was marked in [facility] for immediate notification  Workstation performed: [id number]       EKG, Pathology, and Other Studies Reviewed on Admission:       Allscripts/ Epic Records Reviewed: Yes    ** Please Note: This note has been constructed using a voice recognition system   **

## 2022-02-19 NOTE — PROGRESS NOTES
[address] [facility]  Progress Note - Karlee Moreau 1965, 61 y o  male MRN: [id number]  Unit/Bed#: -Shawn Encounter: [id number]  Primary Care Provider: Jan Taylor MD   Date and time admitted to hospital: 2/18/2022  3:09 PM    * Colonic mass  Assessment & Plan  69-year-old gentleman with history of diabetes mellitus and hypertension who presents to the hospital worsening abdominal pain and bowel irregularities  In the ED imaging concerning for new colon mass with hepatic metastasis  · Apple core lesion noted on imaging  · GI, Gen surgery on board  · Given obstructive quality, planned for diverting colostomy per gen surgery and will obtain bx of hepatic lesion  Tentatively planned for monday  · Oncology consulted  · On clears    Microcytic anemia  Assessment & Plan    Results from last 7 days   Lab Units 02/19/22  0500 02/18/22  1522   HEMOGLOBIN g/dL 9 3* 10 9*   · ABLA likely bleeding from colonic mass  · No overt sign of acute blood loss  · Cont to monitor, transfuse if < 7    Type 2 diabetes mellitus with hyperlipidemia [facility])  Assessment & Plan  Lab Results   Component Value Date    HGBA1C 7 5 (H) 02/10/2022       Recent Labs     02/18/22  2053 02/18/22  2114 02/18/22  2255 02/19/22  0742   POCGLU 57* 81 145* 82   · During hospitalization hold oral agents    · D/c lantus given on clears, and had periods of hypoglycemia  · Hypoglycemic protocol  · Cont ISS     Stage 3a chronic kidney disease [facility])  Assessment & Plan  Lab Results   Component Value Date    EGFR 64 02/19/2022    EGFR 48 02/18/2022    EGFR 45 01/04/2020    CREATININE 1 23 02/19/2022    CREATININE 1 57 (H) 02/18/2022    CREATININE 1 31 (H) 08/26/2021   · Cr w in baseline range    Transaminitis  Assessment & Plan  · Secondary to hepatic metastasis    Hypokalemia  Assessment & Plan  · Repleted accordingly    Morbid obesity ([address] )  Assessment & Plan  BMI of 42 07  Lifestyle/dietary modification    Benign essential hypertension  Assessment & Plan  · Continue lisinopril and amlodipine  · BP stable    VTE Pharmacologic Prophylaxis:   Pharmacologic: Pharmacologic VTE Prophylaxis contraindicated due to Anemia  Pt ambulating  Mechanical VTE Prophylaxis in Place: Yes    Patient Centered Rounds: I have performed bedside rounds with nursing staff today  Discussions with Specialists or Other Care Team Provider:     Education and Discussions with Family / Patient: Patient at length  Offered to call his wife as well  He declined stating he has been updating her but if she has more questions he will let me know  Time Spent for Care: 30 minutes  More than 50% of total time spent on counseling and coordination of care as described above  Current Length of Stay: 1 day(s)    Current Patient Status: Inpatient   Certification Statement: The patient will continue to require additional inpatient hospital stay due to Acute illness, pending surgery    Discharge Plan:     Code Status: Level 1 - Full Code      Subjective:   Sitting up in bed  Abd pain relieved with pain analgesics  Trying to remain optimistic   + flatus    Objective:     Vitals:   Temp (24hrs), Av 2 °F (36 8 °C), Min:97 5 °F (36 4 °C), Max:98 8 °F (37 1 °C)    Temp:  [97 5 °F (36 4 °C)-98 8 °F (37 1 °C)] 98 8 °F (37 1 °C)  HR:  [] 99  Resp:  [18-19] 18  BP: (140-173)/(74-95) 144/90  SpO2:  [93 %-98 %] 93 %  Body mass index is 42 07 kg/m²  Input and Output Summary (last 24 hours): Intake/Output Summary (Last 24 hours) at 2022 1151  Last data filed at 2022 0500  Gross per 24 hour   Intake 360 ml   Output 1150 ml   Net -790 ml       Physical Exam:     Physical Exam  Constitutional:       Appearance: He is obese  Cardiovascular:      Rate and Rhythm: Normal rate and regular rhythm  Pulses: Normal pulses  Heart sounds: Normal heart sounds  No murmur heard  Pulmonary:      Effort: Pulmonary effort is normal  No respiratory distress  Breath sounds: Normal breath sounds  No wheezing or rales  Abdominal:      General: Bowel sounds are normal       Palpations: Abdomen is soft  Tenderness: There is abdominal tenderness  There is no guarding  Musculoskeletal:         General: Normal range of motion  Cervical back: Normal range of motion and neck supple  Right lower leg: No edema  Left lower leg: No edema  Skin:     General: Skin is warm and dry  Neurological:      General: No focal deficit present  Mental Status: He is alert and oriented to person, place, and time  Mental status is at baseline  Cranial Nerves: No cranial nerve deficit  Motor: No weakness  Additional Data:     Labs:    Results from last 7 days   Lab Units 02/19/22  0500 02/18/22  1522 02/18/22  1522   WBC Thousand/uL 12 13*   < > 15 66*   HEMOGLOBIN g/dL 9 3*   < > 10 9*   HEMATOCRIT % 30 4*   < > 35 8*   PLATELETS Thousands/uL 739*   < > 957*   NEUTROS PCT %  --   --  69   LYMPHS PCT %  --   --  18   MONOS PCT %  --   --  11   EOS PCT %  --   --  0    < > = values in this interval not displayed  Results from last 7 days   Lab Units 02/19/22  0500   SODIUM mmol/L 134*   POTASSIUM mmol/L 3 3*   CHLORIDE mmol/L 99*   CO2 mmol/L 27   BUN mg/dL 16   CREATININE mg/dL 1 23   ANION GAP mmol/L 8   CALCIUM mg/dL 8 7   ALBUMIN g/dL 2 5*   TOTAL BILIRUBIN mg/dL 0 59   ALK PHOS U/L 275*   ALT U/L 26   AST U/L 49*   GLUCOSE RANDOM mg/dL 64*         Results from last 7 days   Lab Units 02/19/22  1138 02/19/22  0742 02/18/22  2255 02/18/22  2114 02/18/22  2053   POC GLUCOSE mg/dl 140 82 145* 81 57*                   * I Have Reviewed All Lab Data Listed Above  * Additional Pertinent Lab Tests Reviewed:  All Labs Within Last 24 Hours Reviewed    Imaging:    Imaging Reports Reviewed Today Include:   Imaging Personally Reviewed by Myself Includes:      Recent Cultures (last 7 days):           Last 24 Hours Medication List:   Current Facility-Administered Medications   Medication Dose Route Frequency Provider Last Rate    acetaminophen  650 mg Oral Q6H PRN Hershell Elder, DO      amLODIPine  5 mg Oral BID Hershell Elder, DO      aspirin  81 mg Oral Daily Jc Art, DO      atorvastatin  40 mg Oral Daily Hershell Elder, DO      HYDROmorphone  0 5 mg Intravenous Q4H PRN Hershell Elder, DO      insulin lispro  1-6 Units Subcutaneous 4x Daily (AC & HS) Jc Art,       lisinopril  40 mg Oral Daily Jc Art, DO      ondansetron  4 mg Intravenous Q4H PRN Hershell Elder, DO      oxyCODONE  5 mg Oral Q4H PRN Hershell Elder, DO      pantoprazole  40 mg Oral BID AC Hershell Elder, DO          Today, Patient Was Seen By: Ivis Maldonado DO    ** Please Note: Dictation voice to text software may have been used in the creation of this document   **

## 2022-02-19 NOTE — ASSESSMENT & PLAN NOTE
Lab Results   Component Value Date    HGBA1C 7 5 (H) 02/10/2022       No results for input(s): POCGLU in the last 72 hours  · During hospitalization hold oral agents  · Continue glargine 30 units  Add sliding scale during hospitalization

## 2022-02-19 NOTE — ASSESSMENT & PLAN NOTE
Results from last 7 days   Lab Units 02/19/22  0500 02/18/22  1522   HEMOGLOBIN g/dL 9 3* 10 9*   · ABLA likely bleeding from colonic mass  · No overt sign of acute blood loss  · Cont to monitor, transfuse if < 7

## 2022-02-19 NOTE — CASE MANAGEMENT
Case Management Assessment & Discharge Planning Note    Patient name Demetrio Zamora  Location /-81 MRN [id number]  : 1965 Date 2022       Current Admission Date: 2022  Current Admission Diagnosis:Colonic mass   Patient Active Problem List    Diagnosis Date Noted    Type 2 diabetes mellitus with hyperlipidemia ([facility] ) 2022    Colonic mass 2022    Microcytic anemia 2022    Left ureteral calculus 2020    Incarcerated umbilical hernia     Morbid [facility] ([facility] ) 2018    Testicular hypogonadism 2017    Low testosterone 2017    Type 2 diabetes mellitus without complication, without long-term current use of insulin ([place] [address] ) 2016    Benign essential hypertension 2016    Mixed hyperlipidemia 2016    Erectile dysfunction 2016      LOS (days): 1  Geometric Mean LOS (GMLOS) (days):   Days to GMLOS:     OBJECTIVE:    Risk of Unplanned Readmission Score: 8         Current admission status: Inpatient       Preferred Pharmacy:   [id number] [place] [address]  [address]  [place] [address]  Phone: [phone] Fax: Hernando Araya [address] [address]  Phone: [phone] Fax: [phone]    [address], [place] [address] [facility] [place] [address] 3  [facility] 36  [facility] [place] [address]  Phone: [phone] Fax: [phone]    [facility] [address] [place] [address] [place] [address]  Phone: [phone] Fax: [phone]    [id number] Vickii Kehr - [address]  [address] [address]  Phone: [phone] Fax: [phone]    [address] IN [facility] - [place] Liu [address] [place] [address] [address]  [place] [address]  Phone: [phone] Fax: [phone]    [id number] - Daniel Sarmiento [address]  Phone: [phone] Fax: [phone]    [address] [place] [address], [address]  Phone: [phone] Fax: [phone] - 7489 K[address] Ar[address]  Phone: [phone] Fax: [phone]    [id number] [place] - [address] [facility] [address]  Phone: [phone] Fax: [phone]    [facility] [address]  [address] [facility] [address]  Phone: [phone] Fax: [phone]    [id number]- [address], [place]  [address] [facility] [address] [place] [address]  Phone: [phone] Fax: [phone]    Primary Care Provider: Nitza Bullard MD    Primary Insurance: [employer]  Secondary Insurance:     ASSESSMENT:  [address] [facility], [address] [facility] Representative - Spouse   Primary Phone: [phone] (Mobile)               Advance Directives  Does patient have a [address] [place] [address] Avenue?: No  Was patient offered paperwork?: Yes (Patient agreeable to completing paperwork  CM to print POA document )  Does patient currently have a Health Care decision maker?: Yes, please see Health Care Proxy section  Does patient have Advance Directives?: No  Was patient offered paperwork?: Yes (Patient agreeable to complete paperwork  CM to print )  Primary Contact: Patient's Wife    Readmission Root Cause  30 Day Readmission: No    Patient Information  Admitted from[de-identified] Home  Mental Status: Alert  During Assessment patient was accompanied by: Not accompanied during assessment  Assessment information provided by[de-identified] Patient  Primary Caregiver: Self  Support Systems: Spouse/significant [address] [facility] of Residence: [place] [address] do you live in?: [address] entry access options   Select all that apply : No steps to enter home  Type of Current Residence: 3 story home  Upon entering residence, is there a bedroom on the main floor (no further steps)?: No  A bedroom is located on the following floor levels of residence (select all that apply):: 2nd Floor  Upon entering residence, is there a bathroom on the main floor (no further steps)?: Yes  Number of steps to 2nd floor from main floor: One Flight  In the last 12 months, was there a time when you were not able to pay the mortgage or rent on time?: No  In the last 12 months, how many places have you lived?: 1  In the last 12 months, was there a time when you did not have a steady place to sleep or slept in a shelter (including now)?: No  Homeless/housing insecurity resource given?: N/A  Living Arrangements: Lives w/ Spouse/significant other,Other (Comment) (lives with children)  Is patient a ?: No    Activities of Daily Living Prior to Admission  Functional Status: Independent  Completes ADLs independently?: Yes  Ambulates independently?: Yes  Does patient use assisted devices?: No  Does patient currently own DME?: No  Does patient have a history of Outpatient Therapy (PT/OT)?: No  Does the patient have a history of Short-Term Rehab?: No  Does patient have a history of HHC?: No  Does patient currently have [place] [address]?: No    Patient Information Continued  Income Source: Employed  Does patient have prescription coverage?: Yes ([facility], and he denied any barriers to obtaining or affording prescriptions )  Within the past 12 months, you worried that your food would run out before you got the money to buy more : Never true  Within the past 12 months, the food you bought just didnt last and you didnt have money to get more : Never true  Food insecurity resource given?: N/A  Does patient receive dialysis treatments?: No  Does patient have a history of substance abuse?: No  Does patient have a history of Mental Health Diagnosis?: No    Means of Transportation  Means of Transport to Appts[de-identified] Drives Self  In the past 12 months, has lack of transportation kept you from medical appointments or from getting medications?: No  In the past 12 months, has lack of transportation kept you from meetings, work, or from getting things needed for daily living?: No  Was application for public transport provided?: N/A    DISCHARGE DETAILS:    Discharge planning discussed with[de-identified] Patient  Freedom of Choice: Yes  Comments - Freedom of Choice: Patient denied any needs at this time and would like to see how his hospitalization progresses before agreeing to [facility] vs  OP SW from oncology    CM contacted family/caregiver?: No- see comments (Patient reported that his wife is on her way to the hospital, so CM does not need to call her at this time )  Were Treatment Team discharge recommendations reviewed with patient/caregiver?: Yes  Did patient/caregiver verbalize understanding of patient care needs?: Yes  Were patient/caregiver advised of the risks associated with not following Treatment Team discharge recommendations?: Yes    [address]         [facility] the [facility]?: No    DME Referral Provided  Referral made for DME?: No    Would you like to participate in our [facility] service program?  : No - Declined    Treatment Team Recommendation: Home  Discharge Destination Plan[de-identified] Home  Transport at Discharge : Family

## 2022-02-19 NOTE — ASSESSMENT & PLAN NOTE
· Microcytic anemia likely secondary to GI blood loss  · Recheck hemoglobin in a m      Results from last 7 days   Lab Units 02/18/22  1522   HEMOGLOBIN g/dL 10 9*

## 2022-02-19 NOTE — ASSESSMENT & PLAN NOTE
Estimated body mass index is 41 98 kg/m² as calculated from the following:    Height as of 3/23/21: 5' 10" (1 778 m)  Weight as of 3/23/21: 133 kg (292 lb 9 6 oz)

## 2022-02-19 NOTE — CONSULTS
Consultation - General Surgery   Satish Qiu 61 y o  male MRN: [id number]  Unit/Bed#: -01 Encounter: [id number]    Assessment/Plan     Assessment:  Metastatic liver Ca, most likely from apple core lesion in the transverse colon with near obstructing colon  History of diabetes  History of gastroparesis  History of GERD  History of ventral hernia  History of hyperlipidemia  History of hypertension  Morbid obesity  Plan: Will plan for diagnostic laparoscopy, liver biopsy and diverting loop transverse colostomy tomorrow  I discussed the operative procedure, risks, benefits and alternatives with the patient, he understood and agreed to proceed  History of Present Illness     HPI:  Satish Qiu is a 61 y o  male who presents to the hospital yesterday because of worsening abdominal pain and abdominal distention for the last couple days, the patient is known to me from prior umbilical hernia surgery couple years ago  Patient never had colonoscopy to pass; he noted decreasing the size of stool for several weeks, no blood in the stools, no history of significant weight loss, in addition he noted increasing abdominal distension and pain on the right side of the abdomen without nausea or vomiting  I review labs and CT scan of the abdomen and pelvis  I discussed the findings with the patient  Consults    Review of Systems: The rest of the review of system total of 10 were negative except for the HPI  Historical Information   Past Medical History:   Diagnosis Date    Acute renal failure ([facility] )     [id number] resolved    Diabetes mellitus ([facility] )     Enteritis 08/23/2016    Gastroparesis due to DM ([facility] ) 08/23/2016    GERD (gastroesophageal reflux disease)     Hernia, ventral 08/04/2016    Hyperlipidemia     Hypertension      Past Surgical History:   Procedure Laterality Date    ESOPHAGOGASTRODUODENOSCOPY N/A 8/24/2016    Procedure: ESOPHAGOGASTRODUODENOSCOPY (EGD);   Surgeon: Catracho Tavares MD; [facility]; Service:     KIDNEY STONE SURGERY      TONSILLECTOMY      UMBILICAL HERNIA REPAIR      UMBILICAL HERNIA REPAIR LAPAROSCOPIC N/A 4/26/2019    Procedure: LAPAROSCOPIC UMBILICAL HERNIA REPAIR;  Surgeon: Linette Fish MD;  Location: [facility];  Service: General     Social History   Social History     Substance and Sexual Activity   Alcohol Use Yes    Comment: occasion     Social History     Substance and Sexual Activity   Drug Use No     E-Cigarette/Vaping    E-Cigarette Use Never User      E-Cigarette/Vaping Substances    Nicotine No     THC No     CBD No     Flavoring No     Other No     Unknown No      Social History     Tobacco Use   Smoking Status Never Smoker   Smokeless Tobacco Never Used     Family History: non-contributory    Meds/Allergies   all current active meds have been reviewed, current meds:   Current Facility-Administered Medications   Medication Dose Route Frequency    acetaminophen (TYLENOL) tablet 650 mg  650 mg Oral Q6H PRN    amLODIPine (NORVASC) tablet 5 mg  5 mg Oral BID    aspirin (ECOTRIN LOW STRENGTH) EC tablet 81 mg  81 mg Oral Daily    atorvastatin (LIPITOR) tablet 40 mg  40 mg Oral Daily    HYDROmorphone (DILAUDID) injection 0 5 mg  0 5 mg Intravenous Q4H PRN    insulin glargine (LANTUS) subcutaneous injection 15 Units 0 15 mL  15 Units Subcutaneous QAM    insulin lispro (HumaLOG) 100 units/mL subcutaneous injection 1-6 Units  1-6 Units Subcutaneous 4x Daily (AC & HS)    lisinopril (ZESTRIL) tablet 40 mg  40 mg Oral Daily    ondansetron (ZOFRAN) injection 4 mg  4 mg Intravenous Q4H PRN    oxyCODONE (ROXICODONE) IR tablet 5 mg  5 mg Oral Q4H PRN    pantoprazole (PROTONIX) EC tablet 40 mg  40 mg Oral BID AC    and PTA meds:   Prior to Admission Medications   Prescriptions Last Dose Informant Patient Reported? Taking?    B-D UF III MINI PEN NEEDLES 31G X 5 MM MISC  Self No No   Sig: BY DOES NOT APPLY ROUTE 4 (FOUR) TIMES A DAY   Needle, Disp, (HYPODERMIC NEEDLE) 23G X 1-1/2" MISC  Self No No   Sig: by Does not apply route once a week   amLODIPine (NORVASC) 5 mg tablet   No No   Sig: TAKE 1 TABLET BY MOUTH TWICE A DAY   aspirin 81 MG tablet  Self Yes No   Sig: Take 81 mg by mouth daily     atorvastatin (LIPITOR) 40 mg tablet   No No   Sig: Take 1 tablet (40 mg total) by mouth daily   glyBURIDE-metFORMIN (GLUCOVANCE) 5-500 MG per tablet   No No   Sig: TAKE 2 TABLETS BY MOUTH TWICE A DAY   insulin glargine (Lantus SoloStar) 100 units/mL injection pen   No No   Sig: Inject 30 Units under the skin daily   lisinopril (ZESTRIL) 40 mg tablet   No No   Sig: Take 1 tablet (40 mg total) by mouth daily   metoclopramide (REGLAN) 10 mg tablet   No No   Sig: TAKE 1 TABLET BY MOUTH TWICE A DAY   pantoprazole (PROTONIX) 40 mg tablet   No No   Sig: Take 1 tablet (40 mg total) by mouth 2 (two) times a day   sildenafil (VIAGRA) 100 mg tablet  Self No No   Sig: Take 1 tablet (100 mg total) by mouth daily as needed for erectile dysfunction for up to 60 doses 1 hour prior to needing on an empty stomach   sitaGLIPtin (JANUVIA) 100 mg tablet   No No   Sig: Take 1 tablet (100 mg total) by mouth daily      Facility-Administered Medications: None     Allergies   Allergen Reactions    Shellfish-Derived Products - Food Allergy Anaphylaxis    Erythromycin GI Intolerance     Pt denies       Objective   First Vitals:   Blood Pressure: 143/80 (02/18/22 1508)  Pulse: 100 (02/18/22 1508)  Temperature: 97 5 °F (36 4 °C) (02/18/22 1508)  Temp Source: Oral (02/18/22 2033)  Respirations: 19 (02/18/22 1508)  Height: 5' 10" (177 8 cm) (02/18/22 2320)  Weight - Scale: 133 kg (293 lb 3 4 oz) (02/18/22 2320)  SpO2: 98 % (02/18/22 1508)    Current Vitals:   Blood Pressure: 144/90 (02/19/22 0914)  Pulse: 99 (02/19/22 0914)  Temperature: 98 8 °F (37 1 °C) (02/18/22 2320)  Temp Source: Oral (02/18/22 2320)  Respirations: 18 (02/18/22 2320)  Height: 5' 10" (177 8 cm) (02/18/22 2320)  Weight - Scale: 133 kg (293 lb 3 4 oz) (02/18/22 2320)  SpO2: 93 % (02/19/22 0914)      Intake/Output Summary (Last 24 hours) at 2/19/2022 1056  Last data filed at 2/19/2022 0500  Gross per 24 hour   Intake 360 ml   Output 1150 ml   Net -790 ml       Invasive Devices  Report    Peripheral Intravenous Line            Peripheral IV 02/18/22 Right Antecubital <1 day                Physical Exam  Vitals and nursing note reviewed  Constitutional:       General: He is not in acute distress  Appearance: He is obese  Cardiovascular:      Rate and Rhythm: Normal rate and regular rhythm  Heart sounds: No murmur heard  Pulmonary:      Effort: No respiratory distress  Breath sounds: Normal breath sounds  Abdominal:      Comments: Abdomen is distended, somewhat tender on the right side of the abdomen, no guarding or rebound at this time  No obvious visceromegaly or mass palpable  Skin:     General: Skin is warm  Coloration: Skin is not jaundiced  Findings: No erythema or rash  Neurological:      Mental Status: He is alert and oriented to person, place, and time  Cranial Nerves: No cranial nerve deficit     Psychiatric:         Mood and Affect: Mood normal          Behavior: Behavior normal          Lab Results:   CBC:   Lab Results   Component Value Date    WBC 12 13 (H) 02/19/2022    HGB 9 3 (L) 02/19/2022    HCT 30 4 (L) 02/19/2022    MCV 74 (L) 02/19/2022     (H) 02/19/2022    MCH 22 7 (L) 02/19/2022    MCHC 30 6 (L) 02/19/2022    RDW 18 1 (H) 02/19/2022    MPV 9 6 02/19/2022    NRBC 0 02/18/2022   , CMP:   Lab Results   Component Value Date    SODIUM 134 (L) 02/19/2022    K 3 3 (L) 02/19/2022    CL 99 (L) 02/19/2022    CO2 27 02/19/2022    BUN 16 02/19/2022    CREATININE 1 23 02/19/2022    CALCIUM 8 7 02/19/2022    AST 49 (H) 02/19/2022    ALT 26 02/19/2022    ALKPHOS 275 (H) 02/19/2022    EGFR 64 02/19/2022   , Coagulation: No results found for: PT, INR, APTT, Urinalysis: No results found for: [place], SPECGRAV, PHUR, LEUKOCYTESUR, NITRITE, PROTEINUA, GLUCOSEU, KETONESU, BILIRUBINUR, BLOODU, Amylase: No results found for: AMYLASE, Lipase:   Lab Results   Component Value Date    LIPASE 195 02/18/2022     Imaging: I have personally reviewed pertinent reports  EKG, Pathology, and Other Studies: I have personally reviewed pertinent films in PACS  CT abdomen pelvis with contrast [[id number]] Collected: 02/18/22 1645   Order Status: Completed Updated: 02/18/22 1704   Narrative:     CT ABDOMEN AND PELVIS WITH IV CONTRAST     INDICATION:   Abdominal pain, acute, nonlocalized   abdominal pain  COMPARISON:  CT abdomen/pelvis 1/4/2020  TECHNIQUE:  CT examination of the abdomen and pelvis was performed  Axial, sagittal, and coronal 2D reformatted images were created from the source data and submitted for interpretation  Radiation dose length product (DLP) for this visit:  2200 mGy-cm    This examination, like all CT scans performed in the [facility], was performed utilizing techniques to minimize radiation dose exposure, including the use of iterative   reconstruction and automated exposure control  IV Contrast:  100 mL of iohexol (OMNIPAQUE)   Enteric Contrast:  Enteric contrast was not administered  FINDINGS:     ABDOMEN     LOWER CHEST:  No clinically significant abnormality identified in the visualized lower chest      LIVER/BILIARY TREE:  Innumerable, confluent hypodense lesions, largest measuring approximately 7 2 x 6 2 cm   No biliary ductal dilatation  GALLBLADDER:  There are gallstone(s) within the gallbladder, without pericholecystic inflammatory changes  SPLEEN:  Unremarkable  PANCREAS:  Unremarkable  ADRENAL GLANDS:  Unremarkable       KIDNEYS/URETERS:  No hydronephrosis or urinary tract calculus   One or more simple cysts and sharply circumscribed subcentimeter renal hypodensities are present, too small to accurately characterize, and statistically most likely benign findings  According to recent literature (Radiology 2019) no further workup of these findings is recommended  STOMACH AND BOWEL: Shahrzad Paz is an apple core lesion in the distal transverse colon measuring approximately 3 7 cm in length with upstream transverse and right colonic dilatation and stool retention, with the ascending colon measuring up to 10 2 cm   The   colon distal to the lesion is collapsed   Mild fat stranding surrounding the dilated colon   Small foci of gas peripheral to the colonic stool may represent trapped air; however, pneumatosis cannot be entirely excluded   Small bowel is within normal   limits  APPENDIX:  No findings to suggest appendicitis  ABDOMINOPELVIC CAVITY:  Trace perihepatic and pelvic ascites   No pneumoperitoneum   Nonenlarged mesenteric lymph nodes adjacent to the colonic mass  VESSELS:  Unremarkable for patient's age  PELVIS     REPRODUCTIVE ORGANS:  Unremarkable for patient's age  URINARY BLADDER:  Unremarkable  ABDOMINAL WALL/INGUINAL REGIONS:  Unremarkable  OSSEOUS STRUCTURES:  No acute fracture or destructive osseous lesion   Spinal degenerative changes are noted  Impression:       1   Distal transverse colonic apple core lesion causing a degree of obstruction with dilated upstream colon with mild surrounding fat stranding   Small foci of gas peripheral to colonic stool may represent trapped air; however, pneumatosis cannot be   entirely excluded  2   Innumerable hepatic metastases  3   Nonenlarged mesenteric lymph nodes adjacent to the colonic mass, concerning for metastatic disease, given their location

## 2022-02-19 NOTE — CONSULTS
Consultation -  [facility]  Cesar Anguiano 61 y o  male MRN: [id number]  Unit/Bed#: -01 Encounter: [id number]        Inpatient consult to gastroenterology  Consult performed by: Judah Gillette MD  Consult ordered by: Brendan Peralta DO          Reason for Consult / Principal Problem:     Colon mass and liver masses      ASSESSMENT AND PLAN:      Colon mass and liver masses:  His symptoms and CT findings are very concerning for colon cancer in the transverse colon with metastases to the liver  I discussed this case with Dr Leverette Gaucher from Oncology and we agreed he will need diagnosis and staging  The quickest way to perform this would be to have interventional radiology biopsy one of the large liver lesions  After this biopsy is completed, Oncology can discuss treatment options  If biopsy is not suggestive of metastatic colon cancer then he will require a colonoscopy to biopsy the colon lesion  If he has evidence of worsening significant bleeding or anemia, we will plan for colonoscopy for further evaluation  ______________________________________________________________________    HPI:  He presents for evaluation because of generalized abdominal discomfort and severe weight loss over the past few months although he is unable to qualify exact amount of weight loss  He denies any diarrhea, constipation, and hematochezia but he has had some black colored stools  He denies any family history of colon cancer colon polyps and has never previously had a colonoscopy  Because of the symptoms he came to the hospital for evaluation and was found to have a large apple-core lesion in the transverse colon and multiple liver lesions  REVIEW OF SYSTEMS:    CONSTITUTIONAL: Denies any fever, chills, rigors, but he has had weight loss  HEENT: No earache or tinnitus  Denies hearing loss or visual disturbances  CARDIOVASCULAR: No chest pain or palpitations     RESPIRATORY: Denies any cough, hemoptysis, shortness of breath or dyspnea on exertion  GASTROINTESTINAL: As noted in the History of Present Illness  GENITOURINARY: No problems with urination  Denies any hematuria or dysuria  NEUROLOGIC: No dizziness or vertigo, denies headaches  MUSCULOSKELETAL: Denies any muscle or joint pain  SKIN: Denies skin rashes or itching  ENDOCRINE: Denies excessive thirst  Denies intolerance to heat or cold  PSYCHOSOCIAL: Denies depression or anxiety  Denies any recent memory loss  Historical Information   Past Medical History:   Diagnosis Date    Acute renal failure ([place] [address] )     [id number] resolved    Diabetes mellitus ([place] [address] )     Enteritis 08/23/2016    Gastroparesis due to DM ([place] [address] ) 08/23/2016    GERD (gastroesophageal reflux disease)     Hernia, ventral 08/04/2016    Hyperlipidemia     Hypertension      Past Surgical History:   Procedure Laterality Date    ESOPHAGOGASTRODUODENOSCOPY N/A 8/24/2016    Procedure: ESOPHAGOGASTRODUODENOSCOPY (EGD); Surgeon: Chante Sanchez MD;  Location: [facility];   Service:     KIDNEY STONE SURGERY      TONSILLECTOMY      UMBILICAL HERNIA REPAIR      UMBILICAL HERNIA REPAIR LAPAROSCOPIC N/A 4/26/2019    Procedure: LAPAROSCOPIC UMBILICAL HERNIA REPAIR;  Surgeon: Valerie Moya MD;  Location: [facility];  Service: General     Social History   Social History     Substance and Sexual Activity   Alcohol Use Yes    Comment: occasion     Social History     Substance and Sexual Activity   Drug Use No     Social History     Tobacco Use   Smoking Status Never Smoker   Smokeless Tobacco Never Used     Family History   Problem Relation Age of Onset    Diabetes Mother         mellitus    Lung cancer Mother     Coronary artery disease Father        Meds/Allergies     Medications Prior to Admission   Medication    amLODIPine (NORVASC) 5 mg tablet    aspirin 81 MG tablet    atorvastatin (LIPITOR) 40 mg tablet    B-D UF III MINI PEN NEEDLES 31G X 5 MM MISC    glyBURIDE-metFORMIN (GLUCOVANCE) 5-500 MG per tablet    insulin glargine (Lantus SoloStar) 100 units/mL injection pen    lisinopril (ZESTRIL) 40 mg tablet    metoclopramide (REGLAN) 10 mg tablet    Needle, Disp, (HYPODERMIC NEEDLE) 23G X 1-1/2" MISC    pantoprazole (PROTONIX) 40 mg tablet    sildenafil (VIAGRA) 100 mg tablet    sitaGLIPtin (JANUVIA) 100 mg tablet     Current Facility-Administered Medications   Medication Dose Route Frequency    acetaminophen (TYLENOL) tablet 650 mg  650 mg Oral Q6H PRN    amLODIPine (NORVASC) tablet 5 mg  5 mg Oral BID    aspirin (ECOTRIN LOW STRENGTH) EC tablet 81 mg  81 mg Oral Daily    atorvastatin (LIPITOR) tablet 40 mg  40 mg Oral Daily    HYDROmorphone (DILAUDID) injection 0 5 mg  0 5 mg Intravenous Q4H PRN    insulin glargine (LANTUS) subcutaneous injection 15 Units 0 15 mL  15 Units Subcutaneous QAM    insulin lispro (HumaLOG) 100 units/mL subcutaneous injection 1-6 Units  1-6 Units Subcutaneous 4x Daily (AC & HS)    lisinopril (ZESTRIL) tablet 40 mg  40 mg Oral Daily    ondansetron (ZOFRAN) injection 4 mg  4 mg Intravenous Q4H PRN    oxyCODONE (ROXICODONE) IR tablet 5 mg  5 mg Oral Q4H PRN    pantoprazole (PROTONIX) EC tablet 40 mg  40 mg Oral BID AC    potassium chloride (K-DUR,KLOR-CON) CR tablet 40 mEq  40 mEq Oral Once       Allergies   Allergen Reactions    Shellfish-Derived Products - Food Allergy Anaphylaxis    Erythromycin GI Intolerance     Pt denies           Objective     Blood pressure 144/90, pulse 99, temperature 98 8 °F (37 1 °C), temperature source Oral, resp  rate 18, height 5' 10" (1 778 m), weight 133 kg (293 lb 3 4 oz), SpO2 93 %  Body mass index is 42 07 kg/m²        Intake/Output Summary (Last 24 hours) at 2/19/2022 0944  Last data filed at 2/19/2022 0500  Gross per 24 hour   Intake 360 ml   Output 1150 ml   Net -790 ml         PHYSICAL EXAM:      General Appearance:   Alert, cooperative, no distress   HEENT:   Normocephalic, atraumatic, anicteric      Neck:  Supple, symmetrical, trachea midline   Lungs:   Clear to auscultation bilaterally; no rales, rhonchi or wheezing; respirations unlabored    Heart[de-identified]   Regular rate and rhythm; no murmur, rub, or gallop     Abdomen:   Soft, non-tender, non-distended; normal bowel sounds; no masses, no organomegaly    Genitalia:   Deferred    Rectal:   Deferred    Extremities:  No cyanosis, clubbing or edema    Pulses:  2+ and symmetric all extremities    Skin:  No jaundice, rashes, or lesions    Lymph nodes:  No palpable cervical lymphadenopathy        Lab Results:   Admission on 02/18/2022   Component Date Value    WBC 02/18/2022 15 66*    RBC 02/18/2022 4 85     Hemoglobin 02/18/2022 10 9*    Hematocrit 02/18/2022 35 8*    MCV 02/18/2022 74*    MCH 02/18/2022 22 5*    MCHC 02/18/2022 30 4*    RDW 02/18/2022 18 7*    MPV 02/18/2022 9 3     Platelets 64/52/5683 957*    nRBC 02/18/2022 0     Neutrophils Relative 02/18/2022 69     Immat GRANS % 02/18/2022 1     Lymphocytes Relative 02/18/2022 18     Monocytes Relative 02/18/2022 11     Eosinophils Relative 02/18/2022 0     Basophils Relative 02/18/2022 1     Neutrophils Absolute 02/18/2022 10 99*    Immature Grans Absolute 02/18/2022 0 08     Lymphocytes Absolute 02/18/2022 2 86     Monocytes Absolute 02/18/2022 1 64*    Eosinophils Absolute 02/18/2022 0 00     Basophils Absolute 02/18/2022 0 09     Sodium 02/18/2022 136     Potassium 02/18/2022 3 5     Chloride 02/18/2022 98*    CO2 02/18/2022 26     ANION GAP 02/18/2022 12     BUN 02/18/2022 22     Creatinine 02/18/2022 1 57*    Glucose 02/18/2022 115     Calcium 02/18/2022 9 1     Corrected Calcium 02/18/2022 9 9     AST 02/18/2022 67*    ALT 02/18/2022 37     Alkaline Phosphatase 02/18/2022 349*    Total Protein 02/18/2022 8 0     Albumin 02/18/2022 3 0*    Total Bilirubin 02/18/2022 0 73     eGFR 02/18/2022 48     Lipase 02/18/2022 195     ABO Grouping 02/18/2022 O     Rh Factor 02/18/2022 Positive     Antibody Screen 02/18/2022 Negative     Specimen Expiration Date 02/18/2022 [id number]     ABO Grouping 02/18/2022 O     Rh Factor 02/18/2022 Positive     POC Glucose 02/18/2022 57*    POC Glucose 02/18/2022 81     POC Glucose 02/18/2022 145*    Sodium 02/19/2022 134*    Potassium 02/19/2022 3 3*    Chloride 02/19/2022 99*    CO2 02/19/2022 27     ANION GAP 02/19/2022 8     BUN 02/19/2022 16     Creatinine 02/19/2022 1 23     Glucose 02/19/2022 64*    Calcium 02/19/2022 8 7     Corrected Calcium 02/19/2022 9 9     AST 02/19/2022 49*    ALT 02/19/2022 26     Alkaline Phosphatase 02/19/2022 275*    Total Protein 02/19/2022 6 6     Albumin 02/19/2022 2 5*    Total Bilirubin 02/19/2022 0 59     eGFR 02/19/2022 64     WBC 02/19/2022 12 13*    RBC 02/19/2022 4 10     Hemoglobin 02/19/2022 9 3*    Hematocrit 02/19/2022 30 4*    MCV 02/19/2022 74*    MCH 02/19/2022 22 7*    MCHC 02/19/2022 30 6*    RDW 02/19/2022 18 1*    Platelets 67/65/2617 739*    MPV 02/19/2022 9 6     POC Glucose 02/19/2022 82        Imaging Studies: I have personally reviewed pertinent imaging studies

## 2022-02-20 ENCOUNTER — ANESTHESIA (INPATIENT)
Dept: PERIOP | Facility: HOSPITAL | Age: 57
DRG: 330 | End: 2022-02-20
Payer: COMMERCIAL

## 2022-02-20 LAB
ANION GAP SERPL CALCULATED.3IONS-SCNC: 11 MMOL/L (ref 4–13)
BUN SERPL-MCNC: 16 MG/DL (ref 5–25)
CALCIUM SERPL-MCNC: 8.8 MG/DL (ref 8.3–10.1)
CEA SERPL-MCNC: 929.2 NG/ML (ref 0–3)
CHLORIDE SERPL-SCNC: 100 MMOL/L (ref 100–108)
CO2 SERPL-SCNC: 24 MMOL/L (ref 21–32)
CREAT SERPL-MCNC: 1.27 MG/DL (ref 0.6–1.3)
ERYTHROCYTE [DISTWIDTH] IN BLOOD BY AUTOMATED COUNT: 18.3 % (ref 11.6–15.1)
FERRITIN SERPL-MCNC: 125 NG/ML (ref 8–388)
GFR SERPL CREATININE-BSD FRML MDRD: 62 ML/MIN/1.73SQ M
GLUCOSE SERPL-MCNC: 154 MG/DL (ref 65–140)
GLUCOSE SERPL-MCNC: 232 MG/DL (ref 65–140)
GLUCOSE SERPL-MCNC: 317 MG/DL (ref 65–140)
GLUCOSE SERPL-MCNC: 82 MG/DL (ref 65–140)
GLUCOSE SERPL-MCNC: 88 MG/DL (ref 65–140)
HCT VFR BLD AUTO: 33.3 % (ref 36.5–49.3)
HGB BLD-MCNC: 10.6 G/DL (ref 12–17)
IRON SATN MFR SERPL: 11 % (ref 20–50)
IRON SERPL-MCNC: 26 UG/DL (ref 65–175)
MCH RBC QN AUTO: 23.5 PG (ref 26.8–34.3)
MCHC RBC AUTO-ENTMCNC: 31.8 G/DL (ref 31.4–37.4)
MCV RBC AUTO: 74 FL (ref 82–98)
PLATELET # BLD AUTO: 723 THOUSANDS/UL (ref 149–390)
PMV BLD AUTO: 9.6 FL (ref 8.9–12.7)
POTASSIUM SERPL-SCNC: 3.6 MMOL/L (ref 3.5–5.3)
RBC # BLD AUTO: 4.52 MILLION/UL (ref 3.88–5.62)
SODIUM SERPL-SCNC: 135 MMOL/L (ref 136–145)
TIBC SERPL-MCNC: 235 UG/DL (ref 250–450)
WBC # BLD AUTO: 13.22 THOUSAND/UL (ref 4.31–10.16)

## 2022-02-20 PROCEDURE — 82948 REAGENT STRIP/BLOOD GLUCOSE: CPT

## 2022-02-20 PROCEDURE — 82378 CARCINOEMBRYONIC ANTIGEN: CPT | Performed by: INTERNAL MEDICINE

## 2022-02-20 PROCEDURE — 88341 IMHCHEM/IMCYTCHM EA ADD ANTB: CPT | Performed by: PATHOLOGY

## 2022-02-20 PROCEDURE — 88342 IMHCHEM/IMCYTCHM 1ST ANTB: CPT | Performed by: PATHOLOGY

## 2022-02-20 PROCEDURE — 47001 NDL BIOPSY LVR TM OTH MAJ PX: CPT | Performed by: PHYSICIAN ASSISTANT

## 2022-02-20 PROCEDURE — 88307 TISSUE EXAM BY PATHOLOGIST: CPT | Performed by: PATHOLOGY

## 2022-02-20 PROCEDURE — 86301 IMMUNOASSAY TUMOR CA 19-9: CPT | Performed by: INTERNAL MEDICINE

## 2022-02-20 PROCEDURE — 85027 COMPLETE CBC AUTOMATED: CPT | Performed by: INTERNAL MEDICINE

## 2022-02-20 PROCEDURE — 0FB24ZX EXCISION OF LEFT LOBE LIVER, PERCUTANEOUS ENDOSCOPIC APPROACH, DIAGNOSTIC: ICD-10-PCS | Performed by: SURGERY

## 2022-02-20 PROCEDURE — 44188 LAP COLOSTOMY: CPT | Performed by: PHYSICIAN ASSISTANT

## 2022-02-20 PROCEDURE — 80048 BASIC METABOLIC PNL TOTAL CA: CPT | Performed by: INTERNAL MEDICINE

## 2022-02-20 PROCEDURE — 99232 SBSQ HOSP IP/OBS MODERATE 35: CPT | Performed by: INTERNAL MEDICINE

## 2022-02-20 PROCEDURE — 83550 IRON BINDING TEST: CPT | Performed by: INTERNAL MEDICINE

## 2022-02-20 PROCEDURE — 82728 ASSAY OF FERRITIN: CPT | Performed by: INTERNAL MEDICINE

## 2022-02-20 PROCEDURE — 83540 ASSAY OF IRON: CPT | Performed by: INTERNAL MEDICINE

## 2022-02-20 PROCEDURE — 44188 LAP COLOSTOMY: CPT | Performed by: SURGERY

## 2022-02-20 PROCEDURE — 0D1L4Z4 BYPASS TRANSVERSE COLON TO CUTANEOUS, PERCUTANEOUS ENDOSCOPIC APPROACH: ICD-10-PCS | Performed by: SURGERY

## 2022-02-20 RX ORDER — OXYCODONE HYDROCHLORIDE 10 MG/1
10 TABLET ORAL EVERY 4 HOURS PRN
Status: DISCONTINUED | OUTPATIENT
Start: 2022-02-20 | End: 2022-02-25 | Stop reason: HOSPADM

## 2022-02-20 RX ORDER — ROCURONIUM BROMIDE 10 MG/ML
INJECTION, SOLUTION INTRAVENOUS AS NEEDED
Status: DISCONTINUED | OUTPATIENT
Start: 2022-02-20 | End: 2022-02-20

## 2022-02-20 RX ORDER — MIDAZOLAM HYDROCHLORIDE 2 MG/2ML
INJECTION, SOLUTION INTRAMUSCULAR; INTRAVENOUS AS NEEDED
Status: DISCONTINUED | OUTPATIENT
Start: 2022-02-20 | End: 2022-02-20

## 2022-02-20 RX ORDER — HYDROMORPHONE HCL 110MG/55ML
PATIENT CONTROLLED ANALGESIA SYRINGE INTRAVENOUS AS NEEDED
Status: DISCONTINUED | OUTPATIENT
Start: 2022-02-20 | End: 2022-02-20

## 2022-02-20 RX ORDER — ONDANSETRON 2 MG/ML
4 INJECTION INTRAMUSCULAR; INTRAVENOUS ONCE AS NEEDED
Status: DISCONTINUED | OUTPATIENT
Start: 2022-02-20 | End: 2022-02-20 | Stop reason: HOSPADM

## 2022-02-20 RX ORDER — ONDANSETRON 2 MG/ML
INJECTION INTRAMUSCULAR; INTRAVENOUS AS NEEDED
Status: DISCONTINUED | OUTPATIENT
Start: 2022-02-20 | End: 2022-02-20

## 2022-02-20 RX ORDER — FENTANYL CITRATE/PF 50 MCG/ML
50 SYRINGE (ML) INJECTION
Status: DISCONTINUED | OUTPATIENT
Start: 2022-02-20 | End: 2022-02-20 | Stop reason: HOSPADM

## 2022-02-20 RX ORDER — GLYCOPYRROLATE 0.2 MG/ML
INJECTION INTRAMUSCULAR; INTRAVENOUS AS NEEDED
Status: DISCONTINUED | OUTPATIENT
Start: 2022-02-20 | End: 2022-02-20

## 2022-02-20 RX ORDER — PROPOFOL 10 MG/ML
INJECTION, EMULSION INTRAVENOUS AS NEEDED
Status: DISCONTINUED | OUTPATIENT
Start: 2022-02-20 | End: 2022-02-20

## 2022-02-20 RX ORDER — FENTANYL CITRATE 50 UG/ML
INJECTION, SOLUTION INTRAMUSCULAR; INTRAVENOUS AS NEEDED
Status: DISCONTINUED | OUTPATIENT
Start: 2022-02-20 | End: 2022-02-20

## 2022-02-20 RX ORDER — HYDROMORPHONE HCL/PF 1 MG/ML
0.5 SYRINGE (ML) INJECTION
Status: DISCONTINUED | OUTPATIENT
Start: 2022-02-20 | End: 2022-02-20 | Stop reason: HOSPADM

## 2022-02-20 RX ORDER — LIDOCAINE HYDROCHLORIDE 10 MG/ML
INJECTION, SOLUTION EPIDURAL; INFILTRATION; INTRACAUDAL; PERINEURAL AS NEEDED
Status: DISCONTINUED | OUTPATIENT
Start: 2022-02-20 | End: 2022-02-20

## 2022-02-20 RX ORDER — MAGNESIUM HYDROXIDE 1200 MG/15ML
LIQUID ORAL AS NEEDED
Status: DISCONTINUED | OUTPATIENT
Start: 2022-02-20 | End: 2022-02-20 | Stop reason: HOSPADM

## 2022-02-20 RX ORDER — NEOSTIGMINE METHYLSULFATE 1 MG/ML
INJECTION INTRAVENOUS AS NEEDED
Status: DISCONTINUED | OUTPATIENT
Start: 2022-02-20 | End: 2022-02-20

## 2022-02-20 RX ORDER — DIPHENHYDRAMINE HYDROCHLORIDE 50 MG/ML
12.5 INJECTION INTRAMUSCULAR; INTRAVENOUS ONCE AS NEEDED
Status: DISCONTINUED | OUTPATIENT
Start: 2022-02-20 | End: 2022-02-20 | Stop reason: HOSPADM

## 2022-02-20 RX ORDER — BUPIVACAINE HYDROCHLORIDE 2.5 MG/ML
INJECTION, SOLUTION EPIDURAL; INFILTRATION; INTRACAUDAL AS NEEDED
Status: DISCONTINUED | OUTPATIENT
Start: 2022-02-20 | End: 2022-02-20 | Stop reason: HOSPADM

## 2022-02-20 RX ORDER — SODIUM CHLORIDE, SODIUM LACTATE, POTASSIUM CHLORIDE, CALCIUM CHLORIDE 600; 310; 30; 20 MG/100ML; MG/100ML; MG/100ML; MG/100ML
20 INJECTION, SOLUTION INTRAVENOUS CONTINUOUS
Status: DISCONTINUED | OUTPATIENT
Start: 2022-02-20 | End: 2022-02-20

## 2022-02-20 RX ORDER — HEPARIN SODIUM 5000 [USP'U]/ML
5000 INJECTION, SOLUTION INTRAVENOUS; SUBCUTANEOUS EVERY 8 HOURS SCHEDULED
Status: DISCONTINUED | OUTPATIENT
Start: 2022-02-20 | End: 2022-02-25 | Stop reason: HOSPADM

## 2022-02-20 RX ORDER — DEXAMETHASONE SODIUM PHOSPHATE 4 MG/ML
INJECTION, SOLUTION INTRA-ARTICULAR; INTRALESIONAL; INTRAMUSCULAR; INTRAVENOUS; SOFT TISSUE AS NEEDED
Status: DISCONTINUED | OUTPATIENT
Start: 2022-02-20 | End: 2022-02-20

## 2022-02-20 RX ORDER — HEPARIN SODIUM 5000 [USP'U]/ML
INJECTION, SOLUTION INTRAVENOUS; SUBCUTANEOUS AS NEEDED
Status: DISCONTINUED | OUTPATIENT
Start: 2022-02-20 | End: 2022-02-20

## 2022-02-20 RX ORDER — SODIUM CHLORIDE, SODIUM LACTATE, POTASSIUM CHLORIDE, CALCIUM CHLORIDE 600; 310; 30; 20 MG/100ML; MG/100ML; MG/100ML; MG/100ML
INJECTION, SOLUTION INTRAVENOUS CONTINUOUS PRN
Status: DISCONTINUED | OUTPATIENT
Start: 2022-02-20 | End: 2022-02-20

## 2022-02-20 RX ADMIN — HYDROMORPHONE HYDROCHLORIDE 0.5 MG: 2 INJECTION, SOLUTION INTRAMUSCULAR; INTRAVENOUS; SUBCUTANEOUS at 10:11

## 2022-02-20 RX ADMIN — SODIUM CHLORIDE, SODIUM LACTATE, POTASSIUM CHLORIDE, AND CALCIUM CHLORIDE 20 ML/HR: .6; .31; .03; .02 INJECTION, SOLUTION INTRAVENOUS at 18:13

## 2022-02-20 RX ADMIN — GLYCOPYRROLATE 0.8 MG: 0.2 INJECTION, SOLUTION INTRAMUSCULAR; INTRAVENOUS at 10:13

## 2022-02-20 RX ADMIN — FENTANYL CITRATE 100 MCG: 50 INJECTION, SOLUTION INTRAMUSCULAR; INTRAVENOUS at 08:56

## 2022-02-20 RX ADMIN — ONDANSETRON 4 MG: 2 INJECTION INTRAMUSCULAR; INTRAVENOUS at 10:13

## 2022-02-20 RX ADMIN — SODIUM CHLORIDE, SODIUM LACTATE, POTASSIUM CHLORIDE, AND CALCIUM CHLORIDE: .6; .31; .03; .02 INJECTION, SOLUTION INTRAVENOUS at 09:58

## 2022-02-20 RX ADMIN — AMLODIPINE BESYLATE 5 MG: 5 TABLET ORAL at 17:43

## 2022-02-20 RX ADMIN — INSULIN LISPRO 5 UNITS: 100 INJECTION, SOLUTION INTRAVENOUS; SUBCUTANEOUS at 22:02

## 2022-02-20 RX ADMIN — LIDOCAINE HYDROCHLORIDE 50 MG: 10 INJECTION, SOLUTION EPIDURAL; INFILTRATION; INTRACAUDAL; PERINEURAL at 08:56

## 2022-02-20 RX ADMIN — PROPOFOL 200 MG: 10 INJECTION, EMULSION INTRAVENOUS at 08:56

## 2022-02-20 RX ADMIN — OXYCODONE HYDROCHLORIDE 10 MG: 10 TABLET ORAL at 19:47

## 2022-02-20 RX ADMIN — MIDAZOLAM HYDROCHLORIDE 2 MG: 1 INJECTION, SOLUTION INTRAMUSCULAR; INTRAVENOUS at 08:48

## 2022-02-20 RX ADMIN — ROCURONIUM BROMIDE 50 MG: 10 INJECTION, SOLUTION INTRAVENOUS at 08:56

## 2022-02-20 RX ADMIN — HYDROMORPHONE HYDROCHLORIDE 0.5 MG: 2 INJECTION, SOLUTION INTRAMUSCULAR; INTRAVENOUS; SUBCUTANEOUS at 09:58

## 2022-02-20 RX ADMIN — DEXAMETHASONE SODIUM PHOSPHATE 4 MG: 4 INJECTION, SOLUTION INTRAMUSCULAR; INTRAVENOUS at 08:59

## 2022-02-20 RX ADMIN — HEPARIN SODIUM 5000 UNITS: 5000 INJECTION INTRAVENOUS; SUBCUTANEOUS at 19:53

## 2022-02-20 RX ADMIN — HEPARIN SODIUM 5000 UNITS: 5000 INJECTION INTRAVENOUS; SUBCUTANEOUS at 09:15

## 2022-02-20 RX ADMIN — NEOSTIGMINE METHYLSULFATE 4 MG: 1 INJECTION INTRAVENOUS at 10:13

## 2022-02-20 RX ADMIN — SODIUM CHLORIDE, SODIUM LACTATE, POTASSIUM CHLORIDE, AND CALCIUM CHLORIDE: .6; .31; .03; .02 INJECTION, SOLUTION INTRAVENOUS at 08:44

## 2022-02-20 RX ADMIN — PIPERACILLIN AND TAZOBACTAM 3.38 G: 36; 4.5 INJECTION, POWDER, FOR SOLUTION INTRAVENOUS at 08:45

## 2022-02-20 RX ADMIN — PANTOPRAZOLE SODIUM 40 MG: 40 TABLET, DELAYED RELEASE ORAL at 17:44

## 2022-02-20 NOTE — ASSESSMENT & PLAN NOTE
Lab Results   Component Value Date    EGFR 62 02/20/2022    EGFR 64 02/19/2022    EGFR 48 02/18/2022    CREATININE 1 27 02/20/2022    CREATININE 1 23 02/19/2022    CREATININE 1 57 (H) 02/18/2022   · Cr w in baseline range

## 2022-02-20 NOTE — ASSESSMENT & PLAN NOTE
Results from last 7 days   Lab Units 02/20/22  0606 02/19/22  0500 02/18/22  1522   HEMOGLOBIN g/dL 10 6* 9 3* 10 9*   · ABLA likely bleeding from colonic mass  · No overt sign of acute blood loss  · Cont to monitor, transfuse if < 7  · H/H remains stable

## 2022-02-20 NOTE — ASSESSMENT & PLAN NOTE
58-year-old gentleman with history of diabetes mellitus and hypertension who presents to the hospital worsening abdominal pain and bowel irregularities    In the ED imaging concerning for new colon mass with hepatic metastasis  · Apple core lesion noted on imaging  · GI, Gen surgery on board  · Given obstructive quality, status post diverting colostomy and hepatic lesion biopsy postop day 0   · Oncology consulted  · On clears diet

## 2022-02-20 NOTE — PLAN OF CARE
Problem: Potential for Falls  Goal: Patient will remain free of falls  Description: INTERVENTIONS:  - Educate patient/family on patient safety including physical limitations  - Instruct patient to call for assistance with activity   - Consult OT/PT to assist with strengthening/mobility   - Keep Call bell within reach  - Keep bed low and locked with side rails adjusted as appropriate  - Keep care items and personal belongings within reach  - Initiate and maintain comfort rounds  - Make Fall Risk Sign visible to staff  - Offer Toileting every 2 Hours, in advance of need  - Initiate/Maintain 24alarm  - Obtain necessary fall risk management equipment:  - Apply yellow socks and bracelet for high fall risk patients  - Consider moving patient to room near nurses station  Outcome: Progressing     Problem: PAIN - ADULT  Goal: Verbalizes/displays adequate comfort level or baseline comfort level  Description: Interventions:  - Encourage patient to monitor pain and request assistance  - Assess pain using appropriate pain scale  - Administer analgesics based on type and severity of pain and evaluate response  - Implement non-pharmacological measures as appropriate and evaluate response  - Consider cultural and social influences on pain and pain management  - Notify physician/advanced practitioner if interventions unsuccessful or patient reports new pain  Outcome: Progressing     Problem: INFECTION - ADULT  Goal: Absence or prevention of progression during hospitalization  Description: INTERVENTIONS:  - Assess and monitor for signs and symptoms of infection  - Monitor lab/diagnostic results  - Monitor all insertion sites, i e  indwelling lines, tubes, and drains  - Monitor endotracheal if appropriate and nasal secretions for changes in amount and color  - [place] appropriate cooling/warming therapies per order  - Administer medications as ordered  - Instruct and encourage patient and family to use good hand hygiene technique  - Identify and instruct in appropriate isolation precautions for identified infection/condition  Outcome: Progressing  Goal: Absence of fever/infection during neutropenic period  Description: INTERVENTIONS:  - Monitor WBC    Outcome: Progressing     Problem: SAFETY ADULT  Goal: Patient will remain free of falls  Description: INTERVENTIONS:  - Educate patient/family on patient safety including physical limitations  - Instruct patient to call for assistance with activity   - Consult OT/PT to assist with strengthening/mobility   - Keep Call bell within reach  - Keep bed low and locked with side rails adjusted as appropriate  - Keep care items and personal belongings within reach  - Initiate and maintain comfort rounds  - Make Fall Risk Sign visible to staff  - Offer Toileting every 2 Hours, in advance of need  - Initiate/Maintain 24alarm  - Obtain necessary fall risk management equipment:  - Apply yellow socks and bracelet for high fall risk patients  - Consider moving patient to room near nurses station  Outcome: Progressing  Goal: Maintain or return to baseline ADL function  Description: INTERVENTIONS:  -  Assess patient's ability to carry out ADLs; assess patient's baseline for ADL function and identify physical deficits which impact ability to perform ADLs (bathing, care of mouth/teeth, toileting, grooming, dressing, etc )  - Assess/evaluate cause of self-care deficits   - Assess range of motion  - Assess patient's mobility; develop plan if impaired  - Assess patient's need for assistive devices and provide as appropriate  - Encourage maximum independence but intervene and supervise when necessary  - Involve family in performance of ADLs  - Assess for home care needs following discharge   - Consider OT consult to assist with ADL evaluation and planning for discharge  - Provide patient education as appropriate  Outcome: Progressing  Goal: Maintains/Returns to pre admission functional level  Description: INTERVENTIONS:  - Perform BMAT or MOVE assessment daily    - Set and communicate daily mobility goal to care team and patient/family/caregiver  - Collaborate with rehabilitation services on mobility goals if consulted  - Perform Range of Motion  times a day  - Reposition patient every  hours  - Dangle patient times a day  - Stand patient  times a day  - Ambulate patient  times a day  - Out of bed to chair  times a day   - Out of bed for me times a day  - Out of bed for toileting  - Record patient progress and toleration of activity level   Outcome: Progressing     Problem: DISCHARGE PLANNING  Goal: Discharge to home or other facility with appropriate resources  Description: INTERVENTIONS:  - Identify barriers to discharge w/patient and caregiver  - Arrange for needed discharge resources and transportation as appropriate  - Identify discharge learning needs (meds, wound care, etc )  - Arrange for interpretive services to assist at discharge as needed  - Refer to Case Management Department for coordinating discharge planning if the patient needs post-hospital services based on physician/advanced practitioner order or complex needs related to functional status, cognitive ability, or social support system  Outcome: Progressing     Problem: Knowledge Deficit  Goal: Patient/family/caregiver demonstrates understanding of disease process, treatment plan, medications, and discharge instructions  Description: Complete learning assessment and assess knowledge base    Interventions:  - Provide teaching at level of understanding  - Provide teaching via preferred learning methods  Outcome: Progressing     Problem: GASTROINTESTINAL - ADULT  Goal: Minimal or absence of nausea and/or vomiting  Description: INTERVENTIONS:  - Administer IV fluids if ordered to ensure adequate hydration  - Maintain NPO status until nausea and vomiting are resolved  - Nasogastric tube if ordered  - Administer ordered antiemetic medications as needed  - Provide nonpharmacologic comfort measures as appropriate  - Advance diet as tolerated, if ordered  - Consider nutrition services referral to assist patient with adequate nutrition and appropriate food choices  Outcome: Progressing  Goal: Maintains or returns to baseline bowel function  Description: INTERVENTIONS:  - Assess bowel function  - Encourage oral fluids to ensure adequate hydration  - Administer IV fluids if ordered to ensure adequate hydration  - Administer ordered medications as needed  - Encourage mobilization and activity  - Consider nutritional services referral to assist patient with adequate nutrition and appropriate food choices  Outcome: Progressing  Goal: Maintains adequate nutritional intake  Description: INTERVENTIONS:  - Monitor percentage of each meal consumed  - Identify factors contributing to decreased intake, treat as appropriate  - Assist with meals as needed  - Monitor I&O, weight, and lab values if indicated  - Obtain nutrition services referral as needed  Outcome: Progressing  Goal: Establish and maintain optimal ostomy function  Description: INTERVENTIONS:  - Assess bowel function  - Encourage oral fluids to ensure adequate hydration  - Administer IV fluids if ordered to ensure adequate hydration   - Administer ordered medications as needed  - Encourage mobilization and activity  - Nutrition services referral to assist patient with appropriate food choices  - Assess stoma site  - Consider wound care consult   Outcome: Progressing  Goal: Oral mucous membranes remain intact  Description: INTERVENTIONS  - Assess oral mucosa and hygiene practices  - Implement preventative oral hygiene regimen  - Implement oral medicated treatments as ordered  - Initiate Nutrition services referral as needed  Outcome: Progressing

## 2022-02-20 NOTE — ANESTHESIA POSTPROCEDURE EVALUATION
Post-Op Assessment Note    CV Status:  Stable  Pain Score: 0    Pain management: adequate     Mental Status:  Alert and awake   Hydration Status:  Euvolemic   PONV Controlled:  Controlled   Airway Patency:  Patent      Post Op Vitals Reviewed: Yes      Staff: CRNA         No complications documented      /83 (02/20/22 1039)    Temp 98 6 °F (37 °C) (02/20/22 1039)    Pulse 105 (02/20/22 1039)   Resp 22 (02/20/22 1039)    SpO2 96 % (02/20/22 1039)

## 2022-02-20 NOTE — PROGRESS NOTES
[address]  Progress Note - Tamera Rizzo 1965, 61 y o  male MRN: [id number]  Unit/Bed#: -01 Encounter: [id number]  Primary Care Provider: Neville Kingston MD   Date and time admitted to hospital: 2/18/2022  3:09 PM    * Colonic mass  Assessment & Plan  65-year-old gentleman with history of diabetes mellitus and hypertension who presents to the hospital worsening abdominal pain and bowel irregularities  In the ED imaging concerning for new colon mass with hepatic metastasis  · Apple core lesion noted on imaging  · GI, Gen surgery on board  · Given obstructive quality, status post diverting colostomy and hepatic lesion biopsy postop day 0   · Oncology consulted  · On clears diet    Microcytic anemia  Assessment & Plan    Results from last 7 days   Lab Units 02/20/22  0606 02/19/22  0500 02/18/22  1522   HEMOGLOBIN g/dL 10 6* 9 3* 10 9*   · ABLA likely bleeding from colonic mass  · No overt sign of acute blood loss  · Cont to monitor, transfuse if < 7  · H/H remains stable    Type 2 diabetes mellitus with hyperlipidemia [facility])  Assessment & Plan  Lab Results   Component Value Date    HGBA1C 7 5 (H) 02/10/2022       Recent Labs     02/19/22  1606 02/19/22  2054 02/20/22  0755 02/20/22  1046   POCGLU 194* 213* 88 154*   · During hospitalization hold oral agents    · Lantus given on clears, and had periods of hypoglycemia  · Hypoglycemic protocol  · Cont ISS     Stage 3a chronic kidney disease [facility])  Assessment & Plan  Lab Results   Component Value Date    EGFR 62 02/20/2022    EGFR 64 02/19/2022    EGFR 48 02/18/2022    CREATININE 1 27 02/20/2022    CREATININE 1 23 02/19/2022    CREATININE 1 57 (H) 02/18/2022   · Cr w in baseline range    Transaminitis  Assessment & Plan  · Secondary to hepatic metastasis    Hypokalemia  Assessment & Plan  · Resolved    Morbid obesity ([address] )  Assessment & Plan  BMI of 42 07  Lifestyle/dietary modification    Benign essential hypertension  Assessment & Plan  · Continue lisinopril and amlodipine  · BP stable      VTE Pharmacologic Prophylaxis:   Pharmacologic: Heparin  Mechanical VTE Prophylaxis in Place: No    Patient Centered Rounds: I have performed bedside rounds with nursing staff today  Discussions with Specialists or Other Care Team Provider:  General surgery    Education and Discussions with Family / Patient:  Patient and his wife    Time Spent for Care: 30 minutes  More than 50% of total time spent on counseling and coordination of care as described above  Current Length of Stay: 2 day(s)    Current Patient Status: Inpatient   Certification Statement: The patient will continue to require additional inpatient hospital stay due to Large colonic mass status post diverting colostomy    Discharge Plan:     Code Status: Level 1 - Full Code      Subjective:   Just returned from surgery  Denies cardiac or pulmonary symptoms  Hemodynamically stable  Abdominal pain at surgical site    Objective:     Vitals:   Temp (24hrs), Av 4 °F (36 9 °C), Min:98 2 °F (36 8 °C), Max:98 6 °F (37 °C)    Temp:  [98 2 °F (36 8 °C)-98 6 °F (37 °C)] 98 4 °F (36 9 °C)  HR:  [] 110  Resp:  [18-23] 20  BP: (138-156)/() 146/100  SpO2:  [92 %-99 %] 95 %  Body mass index is 42 07 kg/m²  Input and Output Summary (last 24 hours): Intake/Output Summary (Last 24 hours) at 2022 1622  Last data filed at 2022 1013  Gross per 24 hour   Intake 2100 ml   Output 1000 ml   Net 1100 ml       Physical Exam:     Physical Exam  Constitutional:       Appearance: He is obese  Cardiovascular:      Rate and Rhythm: Normal rate and regular rhythm  Pulses: Normal pulses  Heart sounds: Normal heart sounds  No murmur heard  Pulmonary:      Effort: Pulmonary effort is normal  No respiratory distress  Breath sounds: Normal breath sounds  No wheezing or rales  Abdominal:      General: Bowel sounds are normal  There is no distension        Palpations: Abdomen is soft  Tenderness: There is no abdominal tenderness  There is no guarding  Comments: With tenderness at surgical site  Positive colostomy   Musculoskeletal:         General: Normal range of motion  Cervical back: Normal range of motion and neck supple  Right lower leg: No edema  Left lower leg: No edema  Skin:     General: Skin is warm and dry  Neurological:      General: No focal deficit present  Mental Status: He is alert and oriented to person, place, and time  Mental status is at baseline  Cranial Nerves: No cranial nerve deficit  Motor: No weakness  Additional Data:     Labs:    Results from last 7 days   Lab Units 02/20/22  0606 02/19/22  0500 02/18/22  1522   WBC Thousand/uL 13 22*   < > 15 66*   HEMOGLOBIN g/dL 10 6*   < > 10 9*   HEMATOCRIT % 33 3*   < > 35 8*   PLATELETS Thousands/uL 723*   < > 957*   NEUTROS PCT %  --   --  69   LYMPHS PCT %  --   --  18   MONOS PCT %  --   --  11   EOS PCT %  --   --  0    < > = values in this interval not displayed  Results from last 7 days   Lab Units 02/20/22  0606 02/19/22  0500 02/19/22  0500   SODIUM mmol/L 135*   < > 134*   POTASSIUM mmol/L 3 6   < > 3 3*   CHLORIDE mmol/L 100   < > 99*   CO2 mmol/L 24   < > 27   BUN mg/dL 16   < > 16   CREATININE mg/dL 1 27   < > 1 23   ANION GAP mmol/L 11   < > 8   CALCIUM mg/dL 8 8   < > 8 7   ALBUMIN g/dL  --   --  2 5*   TOTAL BILIRUBIN mg/dL  --   --  0 59   ALK PHOS U/L  --   --  275*   ALT U/L  --   --  26   AST U/L  --   --  49*   GLUCOSE RANDOM mg/dL 82   < > 64*    < > = values in this interval not displayed  Results from last 7 days   Lab Units 02/20/22  1046 02/20/22  0755 02/19/22 2054 02/19/22  1606 02/19/22  1138 02/19/22  0742 02/18/22  2255 02/18/22  2114 02/18/22 2053   POC GLUCOSE mg/dl 154* 88 213* 194* 140 82 145* 81 57*                   * I Have Reviewed All Lab Data Listed Above  * Additional Pertinent Lab Tests Reviewed:  All Labs Within Last 24 Hours Reviewed    Imaging:    Imaging Reports Reviewed Today Include:   Imaging Personally Reviewed by Myself Includes:      Recent Cultures (last 7 days):           Last 24 Hours Medication List:   Current Facility-Administered Medications   Medication Dose Route Frequency Provider Last Rate    acetaminophen  650 mg Oral Q6H PRN Mary Arnold, PA-BHAVANA      amLODIPine  5 mg Oral BID ANJELICA Cronin-BHAVANA      aspirin  81 mg Oral Daily Jade Arnold, PA-C      atorvastatin  40 mg Oral Daily ANJELICA Mullen-BHAVANA      heparin (porcine)  5,000 Units Subcutaneous [facility] Mary Arnold, NANETTE      HYDROmorphone  0 5 mg Intravenous Q4H PRN ANJELICA Cronin-BHAVANA      insulin lispro  1-6 Units Subcutaneous 4x Daily (AC & HS) Jade Arnold PA-C      lactated ringers  20 mL/hr Intravenous Continuous Martinez Hartmann MD      lisinopril  40 mg Oral Daily Jade Arnold PA-C      ondansetron  4 mg Intravenous Q4H PRN ANJELICA Cronin-BHAVANA      oxyCODONE  10 mg Oral Q4H PRN Mary Arnold, PA-BHAVANA      oxyCODONE  5 mg Oral Q4H PRN Mary Arnold, PA-BHAVANA      pantoprazole  40 mg Oral BID AC Jade Perez PA-C          Today, Patient Was Seen By: Darcy Ghotra DO    ** Please Note: Dictation voice to text software may have been used in the creation of this document   **

## 2022-02-20 NOTE — ASSESSMENT & PLAN NOTE
Lab Results   Component Value Date    HGBA1C 7 5 (H) 02/10/2022       Recent Labs     02/19/22  1606 02/19/22  2054 02/20/22  0755 02/20/22  1046   POCGLU 194* 213* 88 154*   · During hospitalization hold oral agents    · Lantus given on clears, and had periods of hypoglycemia  · Hypoglycemic protocol  · Cont ISS

## 2022-02-20 NOTE — PLAN OF CARE
Problem: Potential for Falls  Goal: Patient will remain free of falls  Description: INTERVENTIONS:  - Educate patient/family on patient safety including physical limitations  - Instruct patient to call for assistance with activity   - Consult OT/PT to assist with strengthening/mobility   - Keep Call bell within reach  - Keep bed low and locked with side rails adjusted as appropriate  - Keep care items and personal belongings within reach  - Initiate and maintain comfort rounds  - Make Fall Risk Sign visible to staff  - Offer Toileting every 2 Hours, in advance of need  - Initiate/Maintain 24alarm  - Obtain necessary fall risk management equipment:  - Apply yellow socks and bracelet for high fall risk patients  - Consider moving patient to room near nurses station  Outcome: Progressing     Problem: PAIN - ADULT  Goal: Verbalizes/displays adequate comfort level or baseline comfort level  Description: Interventions:  - Encourage patient to monitor pain and request assistance  - Assess pain using appropriate pain scale  - Administer analgesics based on type and severity of pain and evaluate response  - Implement non-pharmacological measures as appropriate and evaluate response  - Consider cultural and social influences on pain and pain management  - Notify physician/advanced practitioner if interventions unsuccessful or patient reports new pain  Outcome: Progressing     Problem: INFECTION - ADULT  Goal: Absence or prevention of progression during hospitalization  Description: INTERVENTIONS:  - Assess and monitor for signs and symptoms of infection  - Monitor lab/diagnostic results  - Monitor all insertion sites, i e  indwelling lines, tubes, and drains  - Monitor endotracheal if appropriate and nasal secretions for changes in amount and color  - [place] appropriate cooling/warming therapies per order  - Administer medications as ordered  - Instruct and encourage patient and family to use good hand hygiene technique  - Identify and instruct in appropriate isolation precautions for identified infection/condition  Outcome: Progressing  Goal: Absence of fever/infection during neutropenic period  Description: INTERVENTIONS:  - Monitor WBC    Outcome: Progressing     Problem: SAFETY ADULT  Goal: Patient will remain free of falls  Description: INTERVENTIONS:  - Educate patient/family on patient safety including physical limitations  - Instruct patient to call for assistance with activity   - Consult OT/PT to assist with strengthening/mobility   - Keep Call bell within reach  - Keep bed low and locked with side rails adjusted as appropriate  - Keep care items and personal belongings within reach  - Initiate and maintain comfort rounds  - Make Fall Risk Sign visible to staff  - Offer Toileting every 2 Hours, in advance of need  - Initiate/Maintain 24alarm  - Obtain necessary fall risk management equipment:  - Apply yellow socks and bracelet for high fall risk patients  - Consider moving patient to room near nurses station  Outcome: Progressing  Goal: Maintain or return to baseline ADL function  Description: INTERVENTIONS:  -  Assess patient's ability to carry out ADLs; assess patient's baseline for ADL function and identify physical deficits which impact ability to perform ADLs (bathing, care of mouth/teeth, toileting, grooming, dressing, etc )  - Assess/evaluate cause of self-care deficits   - Assess range of motion  - Assess patient's mobility; develop plan if impaired  - Assess patient's need for assistive devices and provide as appropriate  - Encourage maximum independence but intervene and supervise when necessary  - Involve family in performance of ADLs  - Assess for home care needs following discharge   - Consider OT consult to assist with ADL evaluation and planning for discharge  - Provide patient education as appropriate  Outcome: Progressing  Goal: Maintains/Returns to pre admission functional level  Description: INTERVENTIONS:  - Perform BMAT or MOVE assessment daily    - Set and communicate daily mobility goal to care team and patient/family/caregiver  - Collaborate with rehabilitation services on mobility goals if consulted  - Perform Range of Motion 3 times a day  - Reposition patient every 3 hours  - Dangle patient 3 times a day  - Stand patient 3 times a day  - Ambulate patient3 times a day  - Out of bed to chair 3 times a day   - Out of bed for meals 3times a day  - Out of bed for toileting  - Record patient progress and toleration of activity level   Outcome: Progressing     Problem: DISCHARGE PLANNING  Goal: Discharge to home or other facility with appropriate resources  Description: INTERVENTIONS:  - Identify barriers to discharge w/patient and caregiver  - Arrange for needed discharge resources and transportation as appropriate  - Identify discharge learning needs (meds, wound care, etc )  - Arrange for interpretive services to assist at discharge as needed  - Refer to Case Management Department for coordinating discharge planning if the patient needs post-hospital services based on physician/advanced practitioner order or complex needs related to functional status, cognitive ability, or social support system  Outcome: Progressing     Problem: Knowledge Deficit  Goal: Patient/family/caregiver demonstrates understanding of disease process, treatment plan, medications, and discharge instructions  Description: Complete learning assessment and assess knowledge base    Interventions:  - Provide teaching at level of understanding  - Provide teaching via preferred learning methods  Outcome: Progressing     Problem: GASTROINTESTINAL - ADULT  Goal: Minimal or absence of nausea and/or vomiting  Description: INTERVENTIONS:  - Administer IV fluids if ordered to ensure adequate hydration  - Maintain NPO status until nausea and vomiting are resolved  - Nasogastric tube if ordered  - Administer ordered antiemetic medications as needed  - Provide nonpharmacologic comfort measures as appropriate  - Advance diet as tolerated, if ordered  - Consider nutrition services referral to assist patient with adequate nutrition and appropriate food choices  Outcome: Progressing  Goal: Maintains or returns to baseline bowel function  Description: INTERVENTIONS:  - Assess bowel function  - Encourage oral fluids to ensure adequate hydration  - Administer IV fluids if ordered to ensure adequate hydration  - Administer ordered medications as needed  - Encourage mobilization and activity  - Consider nutritional services referral to assist patient with adequate nutrition and appropriate food choices  Outcome: Progressing  Goal: Maintains adequate nutritional intake  Description: INTERVENTIONS:  - Monitor percentage of each meal consumed  - Identify factors contributing to decreased intake, treat as appropriate  - Assist with meals as needed  - Monitor I&O, weight, and lab values if indicated  - Obtain nutrition services referral as needed  Outcome: Progressing  Goal: Establish and maintain optimal ostomy function  Description: INTERVENTIONS:  - Assess bowel function  - Encourage oral fluids to ensure adequate hydration  - Administer IV fluids if ordered to ensure adequate hydration   - Administer ordered medications as needed  - Encourage mobilization and activity  - Nutrition services referral to assist patient with appropriate food choices  - Assess stoma site  - Consider wound care consult   Outcome: Progressing  Goal: Oral mucous membranes remain intact  Description: INTERVENTIONS  - Assess oral mucosa and hygiene practices  - Implement preventative oral hygiene regimen  - Implement oral medicated treatments as ordered  - Initiate Nutrition services referral as needed  Outcome: Progressing

## 2022-02-20 NOTE — OP NOTE
OPERATIVE REPORT  PATIENT NAME: Madison Andrea    :  1965  MRN: [id number]  Pt Location: [facility] OR ROOM 04    SURGERY DATE: 2022    Surgeon(s) and Role:     * Laurinda Lundborg, MD - Primary     * Jade Loomis PA-C - Assisting    Preop Diagnosis:  Colonic mass [K63 89]  Apple-core lesion in the distal transverse colon, near obstructing with metastatic disease to the liver    Post-Op Diagnosis Codes:     * Colonic mass [K63 89]  Apple-core lesion in the distal transverse colon, near obstructing with metastatic disease to the liver    Procedure(s) (LRB):  COLOSTOMY LOOP, diverting (N/A)  DIAGNOSTIC LAPAROSCOPIC LIVER BIOPSY, DIVERTING LOOP COLOSTOMY  (N/A)    Specimen(s):  ID Type Source Tests Collected by Time Destination   1 : Liver Biopsy Tissue Liver TISSUE EXAM Laurinda Lundborg, MD 2022 [id number]        Estimated Blood Loss:   Minimal    Drains:  None    Anesthesia Type:   General    Operative Indications:  Colonic mass [K63 89]    Operative Findings:  During laparoscopy patient was after had mild adhesions between the omentum and previous mesh from umbilical hernia repair  There was no recurrence of the hernia  There were multiple metastatic lesions to the liver on both left and right lobe  Liver biopsy was taken from the left lobe  Loop transverse colostomy was created due to near obstructing apple-core lesion of the distal transverse colon  The rest of the abdominal cavity showed no evidence of inflammatory or neoplastic process  Complications:   None    Procedure and Technique:  The patient was identified in the patient was placed in the operating table in a supine position  After adequate anesthesia induction and satisfactory endotracheal intubation the abdomen was prepped and draped in sterile usual fashion with ChloraPrep  Time-out was called the patient was identified as was surgical site      An incision was made on samuel's point, 5 mm trocar was introduced under direct vision with the help of the VC port  After verifying the position in the abdomen was insufflated with CO2 then the scope was advanced and exploration was performed with above findings  5 mm trocar was placed in the epigastric area and right upper quadrant under direct vision  At this point proceeded to take liver biopsy on the left lobe using Endo Shear season on obtaining approximately 1 cm biopsy of the umbilicated lesion of the left lower the liver  Specimen was sent to pathology for permanent section  There were some adhesions between the omentum and gallbladder and liver there were carefully taken down Deronda Du for us to have good length for the proximal transverse loop colostomy  Circular incision was made around the right upper quadrant trocar site, taken down through the subcutaneous tissue with cautery  Fascia of the rectus muscle was opened with cautery in a cruciate fashion  Rectus muscle was split and posterior rectus she was opened with cautery in a cruciate fashion  Transverse colon was allowed to protrude through the ostomy site by extending the fascia and peritoneal incisions  Attachments of the transverse colon and surrounded omentum were carefully taken down using cautery  Mesentery was pinched between index and thumb and subsequently opened using cautery  A window was created in the mesentery, hemostasis was accomplished with cautery with some of the vessels in the mesentery  Colostomy jordi was passed through the window in the mesentery and secured to the skin with 2-0 nylon  At this point with the help of the laparoscopy the abdomen was insufflated and the abdominal cavity was reinspected with no evidence of bleeding from the mesentery at the colostomy site and from the liver biopsy  Ports were removed under direct without evidence of bleeding from the abdominal wall  Incisions from the trocars were closed with 4-0 Vicryl in an interrupted fashion    Colostomy was matured by dividing the colon in a transverse fashion  Edges were secured to the skin using 3-0 Vicryl in an interrupted fashion  Sterile dressing were applied over the trocar sites and appliance was placed over the colostomy site  At the end of the case instrument, needles, sponges counts were correct  Patient tolerated the procedure well       I was present for the entire procedure, A qualified resident physician was not available and A physician assistant was required during the procedure for retraction tissue handling,dissection and suturing    Patient Disposition:  PACU , hemodynamically stable and extubated and stable      SIGNATURE: Demarco Cedeno MD  DATE: February 20, 2022  TIME: 10:30 AM

## 2022-02-20 NOTE — ANESTHESIA PREPROCEDURE EVALUATION
Procedure:  COLOSTOMY LOOP, diverting (N/A Abdomen)  EXCISION BIOPSY LESION/MASS LIVER LAPAROSCOPIC (N/A Abdomen)    Relevant Problems   CARDIO   (+) Benign essential hypertension   (+) Mixed hyperlipidemia      ENDO   (+) Type 2 diabetes mellitus with hyperlipidemia (HCC)   (+) Type 2 diabetes mellitus without complication, without long-term current use of insulin (HCC)      /RENAL   (+) Stage 3a chronic kidney disease (HCC)      HEMATOLOGY   (+) Microcytic anemia        Physical Exam    Airway    Mallampati score: II         Dental   No notable dental hx     Cardiovascular  Rhythm: regular, Rate: normal,     Pulmonary  Pulmonary exam normal     Other Findings        Anesthesia Plan  ASA Score- 3     Anesthesia Type- general with ASA Monitors  Additional Monitors:   Airway Plan: ETT  Plan Factors-Exercise tolerance (METS): >4 METS  Chart reviewed  Patient summary reviewed  Patient is not a current smoker  Patient not instructed to abstain from smoking on day of procedure  Patient did not smoke on day of surgery  Induction- intravenous  Postoperative Plan-     Informed Consent- Anesthetic plan and risks discussed with patient  I personally reviewed this patient with the CRNA  Discussed and agreed on the Anesthesia Plan with the CRNA  Natalio Randle

## 2022-02-21 ENCOUNTER — TELEPHONE (OUTPATIENT)
Dept: HEMATOLOGY ONCOLOGY | Facility: CLINIC | Age: 57
End: 2022-02-21

## 2022-02-21 PROBLEM — D75.839 THROMBOCYTOSIS: Status: ACTIVE | Noted: 2022-02-21

## 2022-02-21 LAB
ANION GAP SERPL CALCULATED.3IONS-SCNC: 9 MMOL/L (ref 4–13)
BUN SERPL-MCNC: 16 MG/DL (ref 5–25)
CALCIUM SERPL-MCNC: 8.8 MG/DL (ref 8.3–10.1)
CHLORIDE SERPL-SCNC: 99 MMOL/L (ref 100–108)
CO2 SERPL-SCNC: 24 MMOL/L (ref 21–32)
CREAT SERPL-MCNC: 1.43 MG/DL (ref 0.6–1.3)
ERYTHROCYTE [DISTWIDTH] IN BLOOD BY AUTOMATED COUNT: 18.2 % (ref 11.6–15.1)
GFR SERPL CREATININE-BSD FRML MDRD: 53 ML/MIN/1.73SQ M
GLUCOSE SERPL-MCNC: 149 MG/DL (ref 65–140)
GLUCOSE SERPL-MCNC: 154 MG/DL (ref 65–140)
GLUCOSE SERPL-MCNC: 171 MG/DL (ref 65–140)
GLUCOSE SERPL-MCNC: 178 MG/DL (ref 65–140)
GLUCOSE SERPL-MCNC: 185 MG/DL (ref 65–140)
HCT VFR BLD AUTO: 32 % (ref 36.5–49.3)
HGB BLD-MCNC: 10 G/DL (ref 12–17)
MCH RBC QN AUTO: 23.2 PG (ref 26.8–34.3)
MCHC RBC AUTO-ENTMCNC: 31.3 G/DL (ref 31.4–37.4)
MCV RBC AUTO: 74 FL (ref 82–98)
PLATELET # BLD AUTO: 742 THOUSANDS/UL (ref 149–390)
PMV BLD AUTO: 9.8 FL (ref 8.9–12.7)
POTASSIUM SERPL-SCNC: 4.7 MMOL/L (ref 3.5–5.3)
RBC # BLD AUTO: 4.31 MILLION/UL (ref 3.88–5.62)
SODIUM SERPL-SCNC: 132 MMOL/L (ref 136–145)
WBC # BLD AUTO: 17.79 THOUSAND/UL (ref 4.31–10.16)

## 2022-02-21 PROCEDURE — 85027 COMPLETE CBC AUTOMATED: CPT | Performed by: PHYSICIAN ASSISTANT

## 2022-02-21 PROCEDURE — 82948 REAGENT STRIP/BLOOD GLUCOSE: CPT

## 2022-02-21 PROCEDURE — 80048 BASIC METABOLIC PNL TOTAL CA: CPT | Performed by: PHYSICIAN ASSISTANT

## 2022-02-21 PROCEDURE — 99222 1ST HOSP IP/OBS MODERATE 55: CPT | Performed by: PHYSICIAN ASSISTANT

## 2022-02-21 PROCEDURE — 99232 SBSQ HOSP IP/OBS MODERATE 35: CPT | Performed by: INTERNAL MEDICINE

## 2022-02-21 PROCEDURE — 99024 POSTOP FOLLOW-UP VISIT: CPT | Performed by: SURGERY

## 2022-02-21 RX ORDER — SODIUM CHLORIDE 9 MG/ML
100 INJECTION, SOLUTION INTRAVENOUS CONTINUOUS
Status: DISPENSED | OUTPATIENT
Start: 2022-02-21 | End: 2022-02-22

## 2022-02-21 RX ORDER — INSULIN GLARGINE 100 [IU]/ML
15 INJECTION, SOLUTION SUBCUTANEOUS
Status: DISCONTINUED | OUTPATIENT
Start: 2022-02-21 | End: 2022-02-25 | Stop reason: HOSPADM

## 2022-02-21 RX ADMIN — AMLODIPINE BESYLATE 5 MG: 5 TABLET ORAL at 09:01

## 2022-02-21 RX ADMIN — PANTOPRAZOLE SODIUM 40 MG: 40 TABLET, DELAYED RELEASE ORAL at 08:00

## 2022-02-21 RX ADMIN — OXYCODONE HYDROCHLORIDE 5 MG: 5 TABLET ORAL at 13:21

## 2022-02-21 RX ADMIN — OXYCODONE HYDROCHLORIDE 5 MG: 5 TABLET ORAL at 00:34

## 2022-02-21 RX ADMIN — SODIUM CHLORIDE 100 ML/HR: 0.9 INJECTION, SOLUTION INTRAVENOUS at 21:34

## 2022-02-21 RX ADMIN — HEPARIN SODIUM 5000 UNITS: 5000 INJECTION INTRAVENOUS; SUBCUTANEOUS at 05:29

## 2022-02-21 RX ADMIN — INSULIN LISPRO 1 UNITS: 100 INJECTION, SOLUTION INTRAVENOUS; SUBCUTANEOUS at 21:29

## 2022-02-21 RX ADMIN — INSULIN GLARGINE 15 UNITS: 100 INJECTION, SOLUTION SUBCUTANEOUS at 21:28

## 2022-02-21 RX ADMIN — INSULIN LISPRO 1 UNITS: 100 INJECTION, SOLUTION INTRAVENOUS; SUBCUTANEOUS at 12:30

## 2022-02-21 RX ADMIN — PANTOPRAZOLE SODIUM 40 MG: 40 TABLET, DELAYED RELEASE ORAL at 18:28

## 2022-02-21 RX ADMIN — INSULIN LISPRO 1 UNITS: 100 INJECTION, SOLUTION INTRAVENOUS; SUBCUTANEOUS at 18:27

## 2022-02-21 RX ADMIN — HEPARIN SODIUM 5000 UNITS: 5000 INJECTION INTRAVENOUS; SUBCUTANEOUS at 13:22

## 2022-02-21 RX ADMIN — SODIUM CHLORIDE 100 ML/HR: 0.9 INJECTION, SOLUTION INTRAVENOUS at 09:03

## 2022-02-21 RX ADMIN — ASPIRIN 81 MG: 81 TABLET, COATED ORAL at 09:01

## 2022-02-21 RX ADMIN — OXYCODONE HYDROCHLORIDE 5 MG: 5 TABLET ORAL at 04:40

## 2022-02-21 RX ADMIN — OXYCODONE HYDROCHLORIDE 5 MG: 5 TABLET ORAL at 18:31

## 2022-02-21 RX ADMIN — OXYCODONE HYDROCHLORIDE 5 MG: 5 TABLET ORAL at 09:02

## 2022-02-21 RX ADMIN — OXYCODONE HYDROCHLORIDE 5 MG: 5 TABLET ORAL at 23:00

## 2022-02-21 RX ADMIN — AMLODIPINE BESYLATE 5 MG: 5 TABLET ORAL at 18:28

## 2022-02-21 RX ADMIN — ATORVASTATIN CALCIUM 40 MG: 40 TABLET, FILM COATED ORAL at 09:01

## 2022-02-21 RX ADMIN — HEPARIN SODIUM 5000 UNITS: 5000 INJECTION INTRAVENOUS; SUBCUTANEOUS at 21:29

## 2022-02-21 NOTE — PROGRESS NOTES
[facility]  Progress Note - Radha Marshall 1965, 61 y o  male MRN: [id number]  Unit/Bed#: -01 Encounter: [id number]  Primary Care Provider: Maira Donohue MD   Date and time admitted to hospital: 2/18/2022  3:09 PM    * Colonic mass  Assessment & Plan  58-year-old gentleman with history of diabetes mellitus and hypertension who presents to the hospital worsening abdominal pain and bowel irregularities  In the ED imaging concerning for new colon mass with hepatic metastasis  · Apple core lesion noted on imaging  · GI, Gen surgery on board  · Given obstructive quality, status post diverting colostomy and hepatic lesion biopsy postop day 1  · Oncology on board  · S/p CT chest, no definite metastasis in the chest  + pulmonary nodules, repeat ct in 3 months  · Bx pending  Per oncology to arrange for systemic chemo as outpt  · Diet to be advanced per Gen surg  · S/p wound care/ostomy consult for ostomy care and teaching    Microcytic anemia  Assessment & Plan    Results from last 7 days   Lab Units 02/21/22  0450 02/20/22  0606 02/19/22  0500 02/18/22  1522   HEMOGLOBIN g/dL 10 0* 10 6* 9 3* 10 9*   · ABLA likely bleeding from colonic mass  · No overt sign of acute blood loss  · Cont to monitor, transfuse if < 7  · H/H remains stable    Type 2 diabetes mellitus with hyperlipidemia [facility])  Assessment & Plan  Lab Results   Component Value Date    HGBA1C 7 5 (H) 02/10/2022       Recent Labs     02/20/22  2031 02/21/22  0713 02/21/22  1137 02/21/22  1547   POCGLU 317* 149* 171* 178*   · During hospitalization hold oral agents    · Resumed back on lantus at lower dose until diet is fully advanced  · Hypoglycemic protocol  · Cont ISS     Stage 3a chronic kidney disease [facility])  Assessment & Plan  Lab Results   Component Value Date    EGFR 53 02/21/2022    EGFR 62 02/20/2022    EGFR 64 02/19/2022    CREATININE 1 43 (H) 02/21/2022    CREATININE 1 27 02/20/2022    CREATININE 1 23 02/19/2022 · ALEXY on CKD  · Likely due to pre-renal  · Hold lisinopril  · IVF hydration    Thrombocytosis  Assessment & Plan  Likely reactive  Cont to monitor    Transaminitis  Assessment & Plan  · Secondary to hepatic metastasis    Morbid obesity ([facility] [address] )  Assessment & Plan  BMI of 42 07  Lifestyle/dietary modification    Benign essential hypertension  Assessment & Plan  · Cont amlodipine  · Holding lisinopril due to elevated cr  · BP stable    VTE Pharmacologic Prophylaxis:   Pharmacologic: Heparin  Mechanical VTE Prophylaxis in Place: No    Patient Centered Rounds: I have performed bedside rounds with nursing staff today  Discussions with Specialists or Other Care Team Provider:     Education and Discussions with Family / Patient: Patient and his wife    Time Spent for Care: 30 minutes  More than 50% of total time spent on counseling and coordination of care as described above  Current Length of Stay: 3 day(s)    Current Patient Status: Inpatient   Certification Statement: The patient will continue to require additional inpatient hospital stay due to S/p diverting colostomy; improvment of renal fxn    Discharge Plan: D/c planning upon clearance per gen surg and improved renal fxn    Code Status: Level 1 - Full Code      Subjective:   + stool output  Diet just advanced to surgical soft with tolerance thus far  Abd pain only at surgical site  No acute events overnight  Trying to remain optimistic and positive    Objective:     Vitals:   Temp (24hrs), Av 2 °F (36 8 °C), Min:98 °F (36 7 °C), Max:98 3 °F (36 8 °C)    Temp:  [98 °F (36 7 °C)-98 3 °F (36 8 °C)] 98 3 °F (36 8 °C)  HR:  [82-99] 82  Resp:  [18] 18  BP: (130-140)/(76-93) 130/78  SpO2:  [92 %-94 %] 94 %  Body mass index is 42 07 kg/m²  Input and Output Summary (last 24 hours):        Intake/Output Summary (Last 24 hours) at 2022 1848  Last data filed at 2022 1835  Gross per 24 hour   Intake 816 ml   Output 1575 ml   Net -759 ml       Physical Exam:     Physical Exam  Constitutional:       Appearance: He is obese  Cardiovascular:      Rate and Rhythm: Normal rate and regular rhythm  Pulses: Normal pulses  Heart sounds: Normal heart sounds  No murmur heard  Pulmonary:      Effort: Pulmonary effort is normal  No respiratory distress  Breath sounds: Normal breath sounds  No wheezing or rales  Abdominal:      General: Bowel sounds are normal  There is no distension  Palpations: Abdomen is soft  Tenderness: There is abdominal tenderness  There is no guarding  Comments: Tenderness at surgical site  + Colostomy   Musculoskeletal:         General: Normal range of motion  Cervical back: Normal range of motion and neck supple  Right lower leg: No edema  Left lower leg: No edema  Skin:     General: Skin is dry  Neurological:      General: No focal deficit present  Mental Status: He is alert and oriented to person, place, and time  Mental status is at baseline  Cranial Nerves: No cranial nerve deficit  Motor: No weakness  Additional Data:     Labs:    Results from last 7 days   Lab Units 02/21/22  0450 02/19/22  0500 02/18/22  1522   WBC Thousand/uL 17 79*   < > 15 66*   HEMOGLOBIN g/dL 10 0*   < > 10 9*   HEMATOCRIT % 32 0*   < > 35 8*   PLATELETS Thousands/uL 742*   < > 957*   NEUTROS PCT %  --   --  69   LYMPHS PCT %  --   --  18   MONOS PCT %  --   --  11   EOS PCT %  --   --  0    < > = values in this interval not displayed       Results from last 7 days   Lab Units 02/21/22  0450 02/20/22  0606 02/19/22  0500   SODIUM mmol/L 132*   < > 134*   POTASSIUM mmol/L 4 7   < > 3 3*   CHLORIDE mmol/L 99*   < > 99*   CO2 mmol/L 24   < > 27   BUN mg/dL 16   < > 16   CREATININE mg/dL 1 43*   < > 1 23   ANION GAP mmol/L 9   < > 8   CALCIUM mg/dL 8 8   < > 8 7   ALBUMIN g/dL  --   --  2 5*   TOTAL BILIRUBIN mg/dL  --   --  0 59   ALK PHOS U/L  --   --  275*   ALT U/L  --   --  26   AST U/L  -- --  49*   GLUCOSE RANDOM mg/dL 154*   < > 64*    < > = values in this interval not displayed  Results from last 7 days   Lab Units 02/21/22  1547 02/21/22  1137 02/21/22  0713 02/20/22  2031 02/20/22  1620 02/20/22  1046 02/20/22  0755 02/19/22  2054 02/19/22  1606 02/19/22  1138 02/19/22  0742 02/18/22  2255   POC GLUCOSE mg/dl 178* 171* 149* 317* 232* 154* 88 213* 194* 140 82 145*                   * I Have Reviewed All Lab Data Listed Above  * Additional Pertinent Lab Tests Reviewed: All Labs Within Last 24 Hours Reviewed    Imaging:    Imaging Reports Reviewed Today Include:   Imaging Personally Reviewed by Myself Includes:      Recent Cultures (last 7 days):           Last 24 Hours Medication List:   Current Facility-Administered Medications   Medication Dose Route Frequency Provider Last Rate    acetaminophen  650 mg Oral Q6H PRN Karen Arnold PA-C      amLODIPine  5 mg Oral BID ANJELICA Payne-BHAVANA      aspirin  81 mg Oral Daily Jade Arnold, PA-BHAVANA      atorvastatin  40 mg Oral Daily ANJELICA Mullen-BHAVANA      heparin (porcine)  5,000 Units Subcutaneous [facility] Karen Arnold PA-C      HYDROmorphone  0 5 mg Intravenous Q4H PRN ANJELICA Payne-BHAVANA      insulin glargine  15 Units Subcutaneous HS Jenna Russell DO      insulin lispro  1-6 Units Subcutaneous 4x Daily (AC & HS) Karen Arnold PA-C      ondansetron  4 mg Intravenous Q4H PRN Karen Arnold PA-C      oxyCODONE  10 mg Oral Q4H PRN ANJELICA Payne-BHAVANA      oxyCODONE  5 mg Oral Q4H PRN ANJELICA Payne-BHAVANA      pantoprazole  40 mg Oral BID AC Jade Arnold PA-C      sodium chloride  100 mL/hr Intravenous Continuous Ivis Maldonado  mL/hr (02/21/22 1547)        Today, Patient Was Seen By: Ivis Maldonado DO    ** Please Note: Dictation voice to text software may have been used in the creation of this document   **

## 2022-02-21 NOTE — ASSESSMENT & PLAN NOTE
Results from last 7 days   Lab Units 02/21/22  0450 02/20/22  0606 02/19/22  0500 02/18/22  1522   HEMOGLOBIN g/dL 10 0* 10 6* 9 3* 10 9*   · ABLA likely bleeding from colonic mass  · No overt sign of acute blood loss  · Cont to monitor, transfuse if < 7  · H/H remains stable

## 2022-02-21 NOTE — TELEPHONE ENCOUNTER
Soft Intake Form   Patient Details   Tammy Can     1965     Reason For Appointment   Who is Calling? Physician Office   If not patient, Name? Rgoelio Horvath    Who is the Referring Doctor? Rogelio Horvath    What is the diagnosis? metastatic colon cancer, liver biopsy pending   Has this diagnosis been confirmed by a biopsy/surgery? If yes, what is the date it was done? Yes  2/20/22     Biopsy done at [place] [address]? If not, where was it done? yes   Was imaging done, and was it done at [address] [place] [address]? If not, where was it done? Yes  [facility]   Have you been seen by another Oncologist?  If so, who and where (name of facility, city and state) no   For 2nd Opinions Only: Are you currently undergoing treatment, or are you scheduled to start treatment? If yes, name of facility, city and state n/a    For "History Of" only: Have you completed treatment? n/a    Have you had Genetic Testing done in the past?  If so, advise to bring test results to their visit no   Record Gathering Information   Did you advise to have records faxed to [phone]? no   Did you advise to have disks sent to the proper address with imaging? ("History of" Patients)  5 years of imaging for breast patients-Mammos, US etc no   Scheduling Information   What is the best call back number?    (If the [facility] is calling, please use their number) n/a   Miscellaneous Information    Patient is currently admitted in the hospital  Scheduled hospital follow up 3/1/22 11:20 am

## 2022-02-21 NOTE — PLAN OF CARE
Problem: Potential for Falls  Goal: Patient will remain free of falls  Description: INTERVENTIONS:  - Educate patient/family on patient safety including physical limitations  - Instruct patient to call for assistance with activity   - Consult OT/PT to assist with strengthening/mobility   - Keep Call bell within reach  - Keep bed low and locked with side rails adjusted as appropriate  - Keep care items and personal belongings within reach  - Initiate and maintain comfort rounds  - Make Fall Risk Sign visible to staff  - Offer Toileting every 2 Hours, in advance of need  - Initiate/Maintain 24alarm  - Obtain necessary fall risk management equipment:  - Apply yellow socks and bracelet for high fall risk patients  - Consider moving patient to room near nurses station  Outcome: Progressing     Problem: PAIN - ADULT  Goal: Verbalizes/displays adequate comfort level or baseline comfort level  Description: Interventions:  - Encourage patient to monitor pain and request assistance  - Assess pain using appropriate pain scale  - Administer analgesics based on type and severity of pain and evaluate response  - Implement non-pharmacological measures as appropriate and evaluate response  - Consider cultural and social influences on pain and pain management  - Notify physician/advanced practitioner if interventions unsuccessful or patient reports new pain  Outcome: Progressing     Problem: INFECTION - ADULT  Goal: Absence or prevention of progression during hospitalization  Description: INTERVENTIONS:  - Assess and monitor for signs and symptoms of infection  - Monitor lab/diagnostic results  - Monitor all insertion sites, i e  indwelling lines, tubes, and drains  - Monitor endotracheal if appropriate and nasal secretions for changes in amount and color  - Velpen appropriate cooling/warming therapies per order  - Administer medications as ordered  - Instruct and encourage patient and family to use good hand hygiene technique  - Identify and instruct in appropriate isolation precautions for identified infection/condition  Outcome: Progressing  Goal: Absence of fever/infection during neutropenic period  Description: INTERVENTIONS:  - Monitor WBC    Outcome: Progressing     Problem: SAFETY ADULT  Goal: Patient will remain free of falls  Description: INTERVENTIONS:  - Educate patient/family on patient safety including physical limitations  - Instruct patient to call for assistance with activity   - Consult OT/PT to assist with strengthening/mobility   - Keep Call bell within reach  - Keep bed low and locked with side rails adjusted as appropriate  - Keep care items and personal belongings within reach  - Initiate and maintain comfort rounds  - Make Fall Risk Sign visible to staff  - Offer Toileting every 2 Hours, in advance of need  - Initiate/Maintain 24alarm  - Obtain necessary fall risk management equipment:  - Apply yellow socks and bracelet for high fall risk patients  - Consider moving patient to room near nurses station  Outcome: Progressing  Goal: Maintain or return to baseline ADL function  Description: INTERVENTIONS:  -  Assess patient's ability to carry out ADLs; assess patient's baseline for ADL function and identify physical deficits which impact ability to perform ADLs (bathing, care of mouth/teeth, toileting, grooming, dressing, etc )  - Assess/evaluate cause of self-care deficits   - Assess range of motion  - Assess patient's mobility; develop plan if impaired  - Assess patient's need for assistive devices and provide as appropriate  - Encourage maximum independence but intervene and supervise when necessary  - Involve family in performance of ADLs  - Assess for home care needs following discharge   - Consider OT consult to assist with ADL evaluation and planning for discharge  - Provide patient education as appropriate  Outcome: Progressing  Goal: Maintains/Returns to pre admission functional level  Description: INTERVENTIONS:  - Perform BMAT or MOVE assessment daily    - Set and communicate daily mobility goal to care team and patient/family/caregiver  - Collaborate with rehabilitation services on mobility goals if consulted  - Perform Range of Motion  times a day  - Reposition patient every  hours  - Dangle patient  times a day  - Stand patient  times a day  - Ambulate patient  times a day  - Out of bed to chair  times a day   - Out of bed for meals times a day  - Out of bed for toileting  - Record patient progress and toleration of activity level   Outcome: Progressing     Problem: DISCHARGE PLANNING  Goal: Discharge to home or other facility with appropriate resources  Description: INTERVENTIONS:  - Identify barriers to discharge w/patient and caregiver  - Arrange for needed discharge resources and transportation as appropriate  - Identify discharge learning needs (meds, wound care, etc )  - Arrange for interpretive services to assist at discharge as needed  - Refer to Case Management Department for coordinating discharge planning if the patient needs post-hospital services based on physician/advanced practitioner order or complex needs related to functional status, cognitive ability, or social support system  Outcome: Progressing     Problem: Knowledge Deficit  Goal: Patient/family/caregiver demonstrates understanding of disease process, treatment plan, medications, and discharge instructions  Description: Complete learning assessment and assess knowledge base    Interventions:  - Provide teaching at level of understanding  - Provide teaching via preferred learning methods  Outcome: Progressing     Problem: GASTROINTESTINAL - ADULT  Goal: Minimal or absence of nausea and/or vomiting  Description: INTERVENTIONS:  - Administer IV fluids if ordered to ensure adequate hydration  - Maintain NPO status until nausea and vomiting are resolved  - Nasogastric tube if ordered  - Administer ordered antiemetic medications as needed  - Provide nonpharmacologic comfort measures as appropriate  - Advance diet as tolerated, if ordered  - Consider nutrition services referral to assist patient with adequate nutrition and appropriate food choices  Outcome: Progressing  Goal: Maintains or returns to baseline bowel function  Description: INTERVENTIONS:  - Assess bowel function  - Encourage oral fluids to ensure adequate hydration  - Administer IV fluids if ordered to ensure adequate hydration  - Administer ordered medications as needed  - Encourage mobilization and activity  - Consider nutritional services referral to assist patient with adequate nutrition and appropriate food choices  Outcome: Progressing  Goal: Maintains adequate nutritional intake  Description: INTERVENTIONS:  - Monitor percentage of each meal consumed  - Identify factors contributing to decreased intake, treat as appropriate  - Assist with meals as needed  - Monitor I&O, weight, and lab values if indicated  - Obtain nutrition services referral as needed  Outcome: Progressing  Goal: Establish and maintain optimal ostomy function  Description: INTERVENTIONS:  - Assess bowel function  - Encourage oral fluids to ensure adequate hydration  - Administer IV fluids if ordered to ensure adequate hydration   - Administer ordered medications as needed  - Encourage mobilization and activity  - Nutrition services referral to assist patient with appropriate food choices  - Assess stoma site  - Consider wound care consult   Outcome: Progressing  Goal: Oral mucous membranes remain intact  Description: INTERVENTIONS  - Assess oral mucosa and hygiene practices  - Implement preventative oral hygiene regimen  - Implement oral medicated treatments as ordered  - Initiate Nutrition services referral as needed  Outcome: Progressing     Problem: Nutrition/Hydration-ADULT  Goal: Nutrient/Hydration intake appropriate for improving, restoring or maintaining nutritional needs  Description: Monitor and assess patient's nutrition/hydration status for malnutrition  Collaborate with interdisciplinary team and initiate plan and interventions as ordered  Monitor patient's weight and dietary intake as ordered or per policy  Utilize nutrition screening tool and intervene as necessary  Determine patient's food preferences and provide high-protein, high-caloric foods as appropriate       INTERVENTIONS:  - Monitor oral intake, urinary output, labs, and treatment plans  - Assess nutrition and hydration status and recommend course of action  - Evaluate amount of meals eaten  - Assist patient with eating if necessary   - Allow adequate time for meals  - Recommend/ encourage appropriate diets, oral nutritional supplements, and vitamin/mineral supplements  - Order, calculate, and assess calorie counts as needed  - Recommend, monitor, and adjust tube feedings and TPN based on assessed needs  - Assess need for intravenous fluids  - Provide  nutrition/hydration education as appropriate  - Include patient/family/caregiver in decisions related to nutrition  Outcome: Progressing

## 2022-02-21 NOTE — PLAN OF CARE
Problem: Potential for Falls  Goal: Patient will remain free of falls  Description: INTERVENTIONS:  - Educate patient/family on patient safety including physical limitations  - Instruct patient to call for assistance with activity   - Consult OT/PT to assist with strengthening/mobility   - Keep Call bell within reach  - Keep bed low and locked with side rails adjusted as appropriate  - Keep care items and personal belongings within reach  - Initiate and maintain comfort rounds  - Make Fall Risk Sign visible to staff  - Offer Toileting every 2 Hours, in advance of need  - Initiate/Maintain 24alarm  - Obtain necessary fall risk management equipment:  - Apply yellow socks and bracelet for high fall risk patients  - Consider moving patient to room near nurses station  Outcome: Progressing     Problem: PAIN - ADULT  Goal: Verbalizes/displays adequate comfort level or baseline comfort level  Description: Interventions:  - Encourage patient to monitor pain and request assistance  - Assess pain using appropriate pain scale  - Administer analgesics based on type and severity of pain and evaluate response  - Implement non-pharmacological measures as appropriate and evaluate response  - Consider cultural and social influences on pain and pain management  - Notify physician/advanced practitioner if interventions unsuccessful or patient reports new pain  Outcome: Progressing     Problem: INFECTION - ADULT  Goal: Absence or prevention of progression during hospitalization  Description: INTERVENTIONS:  - Assess and monitor for signs and symptoms of infection  - Monitor lab/diagnostic results  - Monitor all insertion sites, i e  indwelling lines, tubes, and drains  - Monitor endotracheal if appropriate and nasal secretions for changes in amount and color  - [place] appropriate cooling/warming therapies per order  - Administer medications as ordered  - Instruct and encourage patient and family to use good hand hygiene technique  - Identify and instruct in appropriate isolation precautions for identified infection/condition  Outcome: Progressing  Goal: Absence of fever/infection during neutropenic period  Description: INTERVENTIONS:  - Monitor WBC    Outcome: Progressing     Problem: SAFETY ADULT  Goal: Patient will remain free of falls  Description: INTERVENTIONS:  - Educate patient/family on patient safety including physical limitations  - Instruct patient to call for assistance with activity   - Consult OT/PT to assist with strengthening/mobility   - Keep Call bell within reach  - Keep bed low and locked with side rails adjusted as appropriate  - Keep care items and personal belongings within reach  - Initiate and maintain comfort rounds  - Make Fall Risk Sign visible to staff  - Offer Toileting every 2 Hours, in advance of need  - Initiate/Maintain 24alarm  - Obtain necessary fall risk management equipment:  - Apply yellow socks and bracelet for high fall risk patients  - Consider moving patient to room near nurses station  Outcome: Progressing  Goal: Maintain or return to baseline ADL function  Description: INTERVENTIONS:  -  Assess patient's ability to carry out ADLs; assess patient's baseline for ADL function and identify physical deficits which impact ability to perform ADLs (bathing, care of mouth/teeth, toileting, grooming, dressing, etc )  - Assess/evaluate cause of self-care deficits   - Assess range of motion  - Assess patient's mobility; develop plan if impaired  - Assess patient's need for assistive devices and provide as appropriate  - Encourage maximum independence but intervene and supervise when necessary  - Involve family in performance of ADLs  - Assess for home care needs following discharge   - Consider OT consult to assist with ADL evaluation and planning for discharge  - Provide patient education as appropriate  Outcome: Progressing  Goal: Maintains/Returns to pre admission functional level  Description: INTERVENTIONS:  - Perform BMAT or MOVE assessment daily    - Set and communicate daily mobility goal to care team and patient/family/caregiver  - Collaborate with rehabilitation services on mobility goals if consulted  - Perform Range of [place]3 times a day  - Reposition patient every 3 hours  - Dangle patient 3 times a day  - Stand patient 3 times a day  - Ambulate patient 3 times a day  - Out of bed to chair 3 times a day   - Out of bed for meals 3times a day  - Out of bed for toileting  - Record patient progress and toleration of activity level   Outcome: Progressing     Problem: DISCHARGE PLANNING  Goal: Discharge to home or other facility with appropriate resources  Description: INTERVENTIONS:  - Identify barriers to discharge w/patient and caregiver  - Arrange for needed discharge resources and transportation as appropriate  - Identify discharge learning needs (meds, wound care, etc )  - Arrange for interpretive services to assist at discharge as needed  - Refer to Case Management Department for coordinating discharge planning if the patient needs post-hospital services based on physician/advanced practitioner order or complex needs related to functional status, cognitive ability, or social support system  Outcome: Progressing     Problem: Knowledge Deficit  Goal: Patient/family/caregiver demonstrates understanding of disease process, treatment plan, medications, and discharge instructions  Description: Complete learning assessment and assess knowledge base    Interventions:  - Provide teaching at level of understanding  - Provide teaching via preferred learning methods  Outcome: Progressing     Problem: GASTROINTESTINAL - ADULT  Goal: Minimal or absence of nausea and/or vomiting  Description: INTERVENTIONS:  - Administer IV fluids if ordered to ensure adequate hydration  - Maintain NPO status until nausea and vomiting are resolved  - Nasogastric tube if ordered  - Administer ordered antiemetic medications as needed  - Provide nonpharmacologic comfort measures as appropriate  - Advance diet as tolerated, if ordered  - Consider nutrition services referral to assist patient with adequate nutrition and appropriate food choices  Outcome: Progressing  Goal: Maintains or returns to baseline bowel function  Description: INTERVENTIONS:  - Assess bowel function  - Encourage oral fluids to ensure adequate hydration  - Administer IV fluids if ordered to ensure adequate hydration  - Administer ordered medications as needed  - Encourage mobilization and activity  - Consider nutritional services referral to assist patient with adequate nutrition and appropriate food choices  Outcome: Progressing  Goal: Maintains adequate nutritional intake  Description: INTERVENTIONS:  - Monitor percentage of each meal consumed  - Identify factors contributing to decreased intake, treat as appropriate  - Assist with meals as needed  - Monitor I&O, weight, and lab values if indicated  - Obtain nutrition services referral as needed  Outcome: Progressing  Goal: Establish and maintain optimal ostomy function  Description: INTERVENTIONS:  - Assess bowel function  - Encourage oral fluids to ensure adequate hydration  - Administer IV fluids if ordered to ensure adequate hydration   - Administer ordered medications as needed  - Encourage mobilization and activity  - Nutrition services referral to assist patient with appropriate food choices  - Assess stoma site  - Consider wound care consult   Outcome: Progressing  Goal: Oral mucous membranes remain intact  Description: INTERVENTIONS  - Assess oral mucosa and hygiene practices  - Implement preventative oral hygiene regimen  - Implement oral medicated treatments as ordered  - Initiate Nutrition services referral as needed  Outcome: Progressing

## 2022-02-21 NOTE — UTILIZATION REVIEW
Inpatient Admission Authorization Request   NOTIFICATION OF INPATIENT ADMISSION/INPATIENT AUTHORIZATION REQUEST   SERVICING FACILITY:   [address] [place] [address]  Tax ID: [id number]  NPI: [id number]  Place of Service: [facility]patient [facility]  [address]  Place of Service Code: 24     ATTENDING PROVIDER:  Attending Name and NPI#: Romo Marquita [[id number]]  Address: [address] [place] [address]  Phone: [phone]     UTILIZATION REVIEW CONTACT:  [facility] Utilization   Network Utilization Review Department  Phone: [phone]  Fax [phone]  Email: [email or web address]     PHYSICIAN ADVISORY SERVICES:  FOR [id number]-[id number] REVIEW - MEDICAL NECESSITY DENIAL  Phone: [phone]  Fax: [phone]  Email: [email or web address]     TYPE OF REQUEST:  Inpatient Status     ADMISSION INFORMATION:  ADMISSION DATE/TIME: 2/18/22  6:29 PM  PATIENT DIAGNOSIS CODE/DESCRIPTION:  Abdominal pain [R10 9]  Colonic mass [K63 89]  DISCHARGE DATE/TIME: No discharge date for patient encounter  DISCHARGE DISPOSITION (IF DISCHARGED): Home/Self Care     IMPORTANT INFORMATION:  Please contact the [facility] directly with any questions or concerns regarding this request  Department voicemails are confidential     Send requests for admission clinical reviews, concurrent reviews, approvals, and administrative denials due to lack of clinical to fax [phone]

## 2022-02-21 NOTE — ASSESSMENT & PLAN NOTE
62-year-old gentleman with history of diabetes mellitus and hypertension who presents to the hospital worsening abdominal pain and bowel irregularities  In the ED imaging concerning for new colon mass with hepatic metastasis  · Apple core lesion noted on imaging  · GI, Gen surgery on board  · Given obstructive quality, status post diverting colostomy and hepatic lesion biopsy postop day 1  · Oncology on board  · S/p CT chest, no definite metastasis in the chest  + pulmonary nodules, repeat ct in 3 months  · Bx pending   Per oncology to arrange for systemic chemo as outpt  · Diet to be advanced per Gen surg  · S/p wound care/ostomy consult for ostomy care and teaching

## 2022-02-21 NOTE — CONSULTS
Medical Oncology/Hematology Consult Note  Tayla Hines, male, 61 y o , 1965,  /-[birth year], [id number]     Reason for admission: colonic  mass  Reason for consultation: colonic mass      ASSESSMENT AND PLAN:     1  Colonic Mass    Presented to ED fo evaluation of syncopal episodes in past two weeks, abdominal 'discomfort', black tarry stools  CT abdomen pelvis with contrast shows transverse colonic apple core lesion measuring 3 7cm in length with obstruction  Innumerable hepatic metastases largest 7 2 x 6 2 cm  Nonenlarged mesenteric lymph nodes adjacent to colonic mass concerning for metastatic disease  CT chest wo contrast calcified and non-calcified b/l nodules largest 6mm RUL indeterminate   2, CA 19-9 pending  AST 49, ALT 26, Alkaline phosphatase 275, Tbili 0 59  S/p diagnostic laparoscopy with diverting loop transverse colostomy secondary to near obstructing apple-core lesion of distal transverse colon and liver biopsy 2020  Liver biopsy pending  If liver biopsy confirms cancer of colonic primary, will require systemic therapy  Will arrange outpatient follow up for appointment with oncologist to discuss treatment options  [facility] messaged  2  Microcytic Anemia   WBC 17 79, RBC 4 31, Hgb 10 0, Hct 32, MCV 74, RDW 18 2, Plt 742,000   Iron panel saturation 11%, TIBC 235, Serum Iron 26, Ferritin 125   In setting of acute blood loss (melena, s/p loop transverse colostomy), will continue to monitor     3  Thrombocytosis   Suspect reactive secondary to acute blood loss   Patient has hx of normal platelet levels from 2020   Continue to trend       Patient understands and is in agreement with this plan  Thank you for the opportunity to participate in this patient's care  Interval History: Feeling well today      ECO    History of present illness: Patient is a 62 yo male PMH significnat for DM with gastroparesis and HTN who presented to ED for evaluation of previous single syncopal episode - occurred when straining to have bowel movement, no head strike or post ictal state and black tarry stools for approximately 2 months  Significant abdominal pain b/l lower quadrants and constipation had patient seek medical attention  [place] 2-3 months ago had altitude sickness and couldn't catch up and had abdominal discomfort since then which patient associated with his gastroparesis  Black tarry stools began in December - constipated with black stools dec/january  No BRBPR  Weight loss since December - report clothes have been significantly looser fitting despite number  Night sweats last 3 weeks one night a week drenching night sweats having to change the sheets  2 episodes in last two weeks  No hx of IBS, crohns, etc      No smoking history, drug use, social drinking  Never had colonoscopy  Family history: mother metastatic breast cancer  No family hx of IBS, crohns etc  No family hx of bleeding or clotting disorders  Review of Systems:   Review of Systems   Constitutional: Positive for appetite change (no appetite), fatigue (affecting work) and unexpected weight change  Negative for chills and fever  HENT: Negative for ear pain and sore throat  Eyes: Negative for pain and visual disturbance  Respiratory: Negative for cough and shortness of breath  Cardiovascular: Positive for palpitations (during work and at night )  Negative for chest pain and leg swelling  Gastrointestinal: Positive for blood in stool (dark stool) and constipation  Negative for abdominal pain (relief after surgery), diarrhea, nausea, rectal pain and vomiting  Abdominal distention: prior to surgery, not currently  Genitourinary: Negative for dysuria and hematuria  Musculoskeletal: Positive for myalgias  Negative for arthralgias and back pain  Skin: Negative for color change and rash  Neurological: Positive for syncope (9 days ago one episode)   Negative for seizures and light-headedness  Hematological: Negative for adenopathy  All other systems reviewed and are negative  PHYSICAL EXAM:    /93 (BP Location: Right arm)   Pulse 99   Temp 98 °F (36 7 °C) (Oral)   Resp 18   Ht 5' 10" (1 778 m)   Wt 133 kg (293 lb 3 4 oz)   SpO2 94%   BMI 42 07 kg/m²     Physical Exam  Vitals and nursing note reviewed  Constitutional:       General: He is not in acute distress  Appearance: Normal appearance  He is obese  He is not ill-appearing or toxic-appearing  HENT:      Head: Normocephalic and atraumatic  Eyes:      General: No scleral icterus  Cardiovascular:      Rate and Rhythm: Normal rate and regular rhythm  Pulses: Normal pulses  Heart sounds: Normal heart sounds  No murmur heard  Pulmonary:      Effort: Pulmonary effort is normal       Breath sounds: Normal breath sounds  No wheezing or rhonchi  Chest:      Chest wall: No tenderness  Abdominal:      General: Bowel sounds are normal  There is no distension  Palpations: Abdomen is soft  There is no mass  Tenderness: There is abdominal tenderness  Comments: Ostomy present   Musculoskeletal:      Right lower leg: No edema  Left lower leg: No edema  Lymphadenopathy:      Cervical: No cervical adenopathy  Skin:     General: Skin is warm and dry  Coloration: Skin is not jaundiced  Findings: No bruising  Neurological:      Mental Status: He is alert and oriented to person, place, and time  Psychiatric:         Thought Content:  Thought content normal          LABS:     Recent Results (from the past 48 hour(s))   Fingerstick Glucose (POCT)    Collection Time: 02/19/22  7:42 AM   Result Value Ref Range    POC Glucose 82 65 - 140 mg/dl   Fingerstick Glucose (POCT)    Collection Time: 02/19/22 11:38 AM   Result Value Ref Range    POC Glucose 140 65 - 140 mg/dl   Fingerstick Glucose (POCT)    Collection Time: 02/19/22  4:06 PM   Result Value Ref Range    POC Glucose 194 (H) 65 - 140 mg/dl   Fingerstick Glucose (POCT)    Collection Time: 02/19/22  8:54 PM   Result Value Ref Range    POC Glucose 213 (H) 65 - 140 mg/dl   CBC    Collection Time: 02/20/22  6:06 AM   Result Value Ref Range    WBC 13 22 (H) 4 31 - 10 16 Thousand/uL    RBC 4 52 3 88 - 5 62 Million/uL    Hemoglobin 10 6 (L) 12 0 - 17 0 g/dL    Hematocrit 33 3 (L) 36 5 - 49 3 %    MCV 74 (L) 82 - 98 fL    MCH 23 5 (L) 26 8 - 34 3 pg    MCHC 31 8 31 4 - 37 4 g/dL    RDW 18 3 (H) 11 6 - 15 1 %    Platelets 958 (H) 569 - 390 Thousands/uL    MPV 9 6 8 9 - 12 7 fL   Basic metabolic panel    Collection Time: 02/20/22  6:06 AM   Result Value Ref Range    Sodium 135 (L) 136 - 145 mmol/L    Potassium 3 6 3 5 - 5 3 mmol/L    Chloride 100 100 - 108 mmol/L    CO2 24 21 - 32 mmol/L    ANION GAP 11 4 - 13 mmol/L    BUN 16 5 - 25 mg/dL    Creatinine 1 27 0 60 - 1 30 mg/dL    Glucose 82 65 - 140 mg/dL    Calcium 8 8 8 3 - 10 1 mg/dL    eGFR 62 ml/min/1 73sq m   CEA    Collection Time: 02/20/22  6:06 AM   Result Value Ref Range     2 (H) 0 0 - 3 0 ng/mL   Iron Saturation %    Collection Time: 02/20/22  6:06 AM   Result Value Ref Range    Iron Saturation 11 (L) 20 - 50 %    TIBC 235 (L) 250 - 450 ug/dL    Iron 26 (L) 65 - 175 ug/dL   Ferritin    Collection Time: 02/20/22  6:06 AM   Result Value Ref Range    Ferritin 125 8 - 388 ng/mL   Fingerstick Glucose (POCT)    Collection Time: 02/20/22  7:55 AM   Result Value Ref Range    POC Glucose 88 65 - 140 mg/dl   Fingerstick Glucose (POCT)    Collection Time: 02/20/22 10:46 AM   Result Value Ref Range    POC Glucose 154 (H) 65 - 140 mg/dl   Fingerstick Glucose (POCT)    Collection Time: 02/20/22  4:20 PM   Result Value Ref Range    POC Glucose 232 (H) 65 - 140 mg/dl   Fingerstick Glucose (POCT)    Collection Time: 02/20/22  8:31 PM   Result Value Ref Range    POC Glucose 317 (H) 65 - 140 mg/dl   CBC    Collection Time: 02/21/22  4:50 AM   Result Value Ref Range WBC 17 79 (H) 4 31 - 10 16 Thousand/uL    RBC 4 31 3 88 - 5 62 Million/uL    Hemoglobin 10 0 (L) 12 0 - 17 0 g/dL    Hematocrit 32 0 (L) 36 5 - 49 3 %    MCV 74 (L) 82 - 98 fL    MCH 23 2 (L) 26 8 - 34 3 pg    MCHC 31 3 (L) 31 4 - 37 4 g/dL    RDW 18 2 (H) 11 6 - 15 1 %    Platelets 368 (H) 280 - 390 Thousands/uL    MPV 9 8 8 9 - 12 7 fL   Basic metabolic panel    Collection Time: 02/21/22  4:50 AM   Result Value Ref Range    Sodium 132 (L) 136 - 145 mmol/L    Potassium 4 7 3 5 - 5 3 mmol/L    Chloride 99 (L) 100 - 108 mmol/L    CO2 24 21 - 32 mmol/L    ANION GAP 9 4 - 13 mmol/L    BUN 16 5 - 25 mg/dL    Creatinine 1 43 (H) 0 60 - 1 30 mg/dL    Glucose 154 (H) 65 - 140 mg/dL    Calcium 8 8 8 3 - 10 1 mg/dL    eGFR 53 ml/min/1 73sq m       CT chest wo contrast    Result Date: 2/20/2022  Narrative: CT CHEST WITHOUT IV CONTRAST INDICATION:   cancer  COMPARISON:  None  TECHNIQUE: CT examination of the chest was performed without intravenous contrast   Axial, sagittal, and coronal 2D reformatted images were created from the source data and submitted for interpretation  Radiation dose length product (DLP) for this visit:  600 mGy-cm   This examination, like all CT scans performed in the [facility], was performed utilizing techniques to minimize radiation dose exposure, including the use of iterative reconstruction and automated exposure control  FINDINGS: LUNGS:  Scattered tiny calcified and noncalcified bilateral nodules (series 3 images 65, 79, 91, 92, 96 and 103), the largest measuring 6 mm in the right upper lobe (series 3 image 42), likely with a small amount of rim calcification  No consolidation or edema  PLEURA:  Unremarkable  HEART/GREAT VESSELS: Atherosclerotic coronary artery calcification is noted  Mitral annular calcifications  Heart is otherwise unremarkable  No thoracic aortic aneurysm  MEDIASTINUM AND NITA:  Small hiatal hernia noted  No mediastinal or hilar lymphadenopathy   CHEST WALL AND LOWER NECK:   Unremarkable  VISUALIZED STRUCTURES IN THE UPPER ABDOMEN:  Distal transverse colonic mass and hepatic metastases again noted, better evaluated on recent dedicated CT  Right renal simple cyst  OSSEOUS STRUCTURES:  No acute fracture or destructive osseous lesion  Impression: No definite metastatic disease in the chest   Scattered tiny calcified and noncalcified bilateral nodules, the largest measuring 6 mm in the right upper lobe, likely with a small amount of rim calcification  Based on current Fleischner Society 2017 Guidelines on incidental pulmonary nodule, patients  with a known malignancy are at increased risk of metastasis and should receive initial three month follow-up chest CT  The study was marked in [facility] for immediate notification  Workstation performed: [id number]     CT abdomen pelvis with contrast    Result Date: 2/18/2022  Narrative: CT ABDOMEN AND PELVIS WITH IV CONTRAST INDICATION:   Abdominal pain, acute, nonlocalized abdominal pain  COMPARISON:  CT abdomen/pelvis 1/4/2020  TECHNIQUE:  CT examination of the abdomen and pelvis was performed  Axial, sagittal, and coronal 2D reformatted images were created from the source data and submitted for interpretation  Radiation dose length product (DLP) for this visit:  2200 mGy-cm   This examination, like all CT scans performed in the [facility], was performed utilizing techniques to minimize radiation dose exposure, including the use of iterative reconstruction and automated exposure control  IV Contrast:  100 mL of iohexol (OMNIPAQUE) Enteric Contrast:  Enteric contrast was not administered  FINDINGS: ABDOMEN LOWER CHEST:  No clinically significant abnormality identified in the visualized lower chest  LIVER/BILIARY TREE:  Innumerable, confluent hypodense lesions, largest measuring approximately 7 2 x 6 2 cm  No biliary ductal dilatation   GALLBLADDER:  There are gallstone(s) within the gallbladder, without pericholecystic inflammatory changes  SPLEEN:  Unremarkable  PANCREAS:  Unremarkable  ADRENAL GLANDS:  Unremarkable  KIDNEYS/URETERS:  No hydronephrosis or urinary tract calculus  One or more simple cysts and sharply circumscribed subcentimeter renal hypodensities are present, too small to accurately characterize, and statistically most likely benign findings  According to recent literature (Radiology 2019) no further workup of these findings is recommended  STOMACH AND BOWEL:  There is an apple core lesion in the distal transverse colon measuring approximately 3 7 cm in length with upstream transverse and right colonic dilatation and stool retention, with the ascending colon measuring up to 10 2 cm  The colon distal to the lesion is collapsed  Mild fat stranding surrounding the dilated colon  Small foci of gas peripheral to the colonic stool may represent trapped air; however, pneumatosis cannot be entirely excluded  Small bowel is within normal limits  APPENDIX:  No findings to suggest appendicitis  ABDOMINOPELVIC CAVITY:  Trace perihepatic and pelvic ascites  No pneumoperitoneum  Nonenlarged mesenteric lymph nodes adjacent to the colonic mass  VESSELS:  Unremarkable for patient's age  PELVIS REPRODUCTIVE ORGANS:  Unremarkable for patient's age  URINARY BLADDER:  Unremarkable  ABDOMINAL WALL/INGUINAL REGIONS:  Unremarkable  OSSEOUS STRUCTURES:  No acute fracture or destructive osseous lesion  Spinal degenerative changes are noted  Impression: 1  Distal transverse colonic apple core lesion causing a degree of obstruction with dilated upstream colon with mild surrounding fat stranding  Small foci of gas peripheral to colonic stool may represent trapped air; however, pneumatosis cannot be entirely excluded  2   Innumerable hepatic metastases  3   Nonenlarged mesenteric lymph nodes adjacent to the colonic mass, concerning for metastatic disease, given their location   The study was marked in [facility] for immediate notification  Workstation performed: [id number]         HISTORY:    Past Medical History:   Diagnosis Date    Acute renal failure ([place] [address] )     [id number] resolved    Diabetes mellitus ([place] [address] )     Enteritis 08/23/2016    Gastroparesis due to DM ([place] [address] ) 08/23/2016    GERD (gastroesophageal reflux disease)     Hernia, ventral 08/04/2016    Hyperlipidemia     Hypertension        Past Surgical History:   Procedure Laterality Date    ESOPHAGOGASTRODUODENOSCOPY N/A 8/24/2016    Procedure: ESOPHAGOGASTRODUODENOSCOPY (EGD); Surgeon: Peace Gutierrez MD;  Location: [facility]; Service:     KIDNEY STONE SURGERY      TONSILLECTOMY      UMBILICAL HERNIA REPAIR      UMBILICAL HERNIA REPAIR LAPAROSCOPIC N/A 4/26/2019    Procedure: LAPAROSCOPIC UMBILICAL HERNIA REPAIR;  Surgeon: Linette Fish MD;  Location: [facility];  Service: General       Family History   Problem Relation Age of Onset    Diabetes Mother         mellitus    Lung cancer Mother     Coronary artery disease Father        Social History     Socioeconomic History    Marital status: /Civil Union     Spouse name: None    Number of children: None    Years of education: None    Highest education level: None   Occupational History    Occupation: ACTOR     Employer: [employer]   Tobacco Use    Smoking status: Never Smoker    Smokeless tobacco: Never Used   Vaping Use    Vaping Use: Never used   Substance and Sexual Activity    Alcohol use: Yes     Comment: occasion    Drug use: No    Sexual activity: Yes     Partners: Female   Other Topics Concern    None   Social History Narrative    None     Social Determinants of Health     Financial Resource Strain: Not on file   Food Insecurity: No Food Insecurity    Worried About Running Out of Food in the Last Year: Never true    [place] of Food in the Last Year: Never true   Transportation Needs: No Transportation Needs    Lack of Transportation (Medical):  No    Lack of Transportation (Non-Medical):  No   Physical Activity: Sufficiently Active    Days of Exercise per Week: 7 days    Minutes of Exercise per Session: 60 min   Stress: No Stress Concern Present    Feeling of Stress : Not at all   Social Connections: Not on file   Intimate Partner Violence: Not on file   Housing Stability: Low Risk     Unable to Pay for Housing in the Last Year: No    Number of Places Lived in the Last Year: 1    Unstable Housing in the Last Year: No         Current Facility-Administered Medications:     acetaminophen (TYLENOL) tablet 650 mg, 650 mg, Oral, Q6H PRN, Lo Arnold PA-C    amLODIPine (NORVASC) tablet 5 mg, 5 mg, Oral, BID, Lo Arnold PA-C, 5 mg at 02/20/22 1743    aspirin (ECOTRIN LOW STRENGTH) EC tablet 81 mg, 81 mg, Oral, Daily, Lo Arnold PA-C, 81 mg at 02/19/22 0911    atorvastatin (LIPITOR) tablet 40 mg, 40 mg, Oral, Daily, Lo Arnold PA-C, 40 mg at 02/19/22 0911    heparin (porcine) subcutaneous injection 5,000 Units, 5,000 Units, Subcutaneous, Q8H [address], Jade Arnold PA-C, 5,000 Units at 02/21/22 0529    HYDROmorphone (DILAUDID) injection 0 5 mg, 0 5 mg, Intravenous, Q4H PRN, Lo Arnold PA-C    insulin lispro (HumaLOG) 100 units/mL subcutaneous injection 1-6 Units, 1-6 Units, Subcutaneous, 4x Daily (AC & HS), 5 Units at 02/20/22 2202 **AND** Fingerstick Glucose (POCT), , , 4x Daily AC and at bedtime, Jade Arnold PA-C    lisinopril (ZESTRIL) tablet 40 mg, 40 mg, Oral, Daily, ANJELICA Salinas-BHAVANA, 40 mg at 02/19/22 0911    ondansetron (ZOFRAN) injection 4 mg, 4 mg, Intravenous, Q4H PRN, Lo Arnold PA-C    oxyCODONE (ROXICODONE) immediate release tablet 10 mg, 10 mg, Oral, Q4H PRN, Lo Arnold PA-C, 10 mg at 02/20/22 1947    oxyCODONE (ROXICODONE) IR tablet 5 mg, 5 mg, Oral, Q4H PRN, Lo Arnold PA-C, 5 mg at 02/21/22 0440    pantoprazole (PROTONIX) EC tablet 40 mg, 40 mg, Oral, BID AC, Jade Arnold, PA-C, 40 mg at 02/20/22 1744    Medications Prior to Admission   Medication    amLODIPine (NORVASC) 5 mg tablet    aspirin 81 MG tablet    atorvastatin (LIPITOR) 40 mg tablet    B-D UF III MINI PEN NEEDLES 31G X 5 MM MISC    glyBURIDE-metFORMIN (GLUCOVANCE) 5-500 MG per tablet    insulin glargine (Lantus SoloStar) 100 units/mL injection pen    lisinopril (ZESTRIL) 40 mg tablet    metoclopramide (REGLAN) 10 mg tablet    Needle, Disp, (HYPODERMIC NEEDLE) 23G X 1-1/2" MISC    pantoprazole (PROTONIX) 40 mg tablet    sildenafil (VIAGRA) 100 mg tablet    sitaGLIPtin (JANUVIA) 100 mg tablet       Allergies   Allergen Reactions    Shellfish-Derived Products - Food Allergy Anaphylaxis    Erythromycin GI Intolerance     Pt denies       Labs and pertinent reports reviewed  This note has been generated by voice recognition software system  Therefore, there may be spelling, grammar, and or syntax errors  Please contact if questions arise

## 2022-02-21 NOTE — ASSESSMENT & PLAN NOTE
Lab Results   Component Value Date    HGBA1C 7 5 (H) 02/10/2022       Recent Labs     02/20/22  2031 02/21/22  0713 02/21/22  1137 02/21/22  1547   POCGLU 317* 149* 171* 178*   · During hospitalization hold oral agents    · Resumed back on lantus at lower dose until diet is fully advanced  · Hypoglycemic protocol  · Cont ISS

## 2022-02-21 NOTE — PROGRESS NOTES
Progress Note - General Surgery   Marguerite Kelley 61 y o  male MRN: [id number]  Unit/Bed#: -01 Encounter: [id number]    Assessment/Plan  Marguerite Kelley is a 61 y o  male     POD1 s/p laparoscopic formation of diverting colostomy for obstructing colonic mass   AVSS, WBC 17 79 from 13 22  Copious stool output from colostomy, 875cc recorded overnight    Advance diet as tolerated to surgical soft diet given persistent stool output  Wound care consult for colostomy education  Trend labs, leukocytosis likely reactive from surgery, continue to monitor  Serial abdominal exams, abdomen soft with markedly improved distention   Pain control/antiemetics prn, patient reports adequate pain control at this time  Encourage ambulation/OOB  IS  Medicine primary, Oncology consulted and following     Subjective/Objective    Subjective: No acute events overnight     Objective:     Blood pressure 132/76, pulse 82, temperature 98 °F (36 7 °C), temperature source Oral, resp  rate 18, height 5' 10" (1 778 m), weight 133 kg (293 lb 3 4 oz), SpO2 92 %  ,Body mass index is 42 07 kg/m²  Intake/Output Summary (Last 24 hours) at 2/21/2022 1117  Last data filed at 2/20/2022 2339  Gross per 24 hour   Intake 896 ml   Output 1400 ml   Net -504 ml       Invasive Devices  Report    Peripheral Intravenous Line            Peripheral IV 02/18/22 Right Antecubital 2 days    Peripheral IV 02/20/22 Dorsal (posterior); Left Hand 1 day          Drain            Colostomy Loop RUQ 1 day                Physical Exam: /76   Pulse 82   Temp 98 °F (36 7 °C) (Oral)   Resp 18   Ht 5' 10" (1 778 m)   Wt 133 kg (293 lb 3 4 oz)   SpO2 92%   BMI 42 07 kg/m²   General appearance: alert and oriented, in no acute distress  Lungs: clear to auscultation bilaterally  Heart: regular rate and rhythm, S1, S2 normal, no murmur, click, rub or gallop  Abdomen: soft, non-distended, normoactive bowel sounds, appropriate incisional tenderness to palpation, colostomy in place with copious soft brown stool output in bag   Extremities: extremities normal, warm and well-perfused; no cyanosis, clubbing, or edema    Lab, Imaging and other studies:I have personally reviewed pertinent lab results       VTE Pharmacologic Prophylaxis: Heparin  VTE Mechanical Prophylaxis: sequential compression device    Recent Results (from the past 36 hour(s))   CBC    Collection Time: 02/20/22  6:06 AM   Result Value Ref Range    WBC 13 22 (H) 4 31 - 10 16 Thousand/uL    RBC 4 52 3 88 - 5 62 Million/uL    Hemoglobin 10 6 (L) 12 0 - 17 0 g/dL    Hematocrit 33 3 (L) 36 5 - 49 3 %    MCV 74 (L) 82 - 98 fL    MCH 23 5 (L) 26 8 - 34 3 pg    MCHC 31 8 31 4 - 37 4 g/dL    RDW 18 3 (H) 11 6 - 15 1 %    Platelets 142 (H) 859 - 390 Thousands/uL    MPV 9 6 8 9 - 12 7 fL   Basic metabolic panel    Collection Time: 02/20/22  6:06 AM   Result Value Ref Range    Sodium 135 (L) 136 - 145 mmol/L    Potassium 3 6 3 5 - 5 3 mmol/L    Chloride 100 100 - 108 mmol/L    CO2 24 21 - 32 mmol/L    ANION GAP 11 4 - 13 mmol/L    BUN 16 5 - 25 mg/dL    Creatinine 1 27 0 60 - 1 30 mg/dL    Glucose 82 65 - 140 mg/dL    Calcium 8 8 8 3 - 10 1 mg/dL    eGFR 62 ml/min/1 73sq m   CEA    Collection Time: 02/20/22  6:06 AM   Result Value Ref Range     2 (H) 0 0 - 3 0 ng/mL   Iron Saturation %    Collection Time: 02/20/22  6:06 AM   Result Value Ref Range    Iron Saturation 11 (L) 20 - 50 %    TIBC 235 (L) 250 - 450 ug/dL    Iron 26 (L) 65 - 175 ug/dL   Ferritin    Collection Time: 02/20/22  6:06 AM   Result Value Ref Range    Ferritin 125 8 - 388 ng/mL   Fingerstick Glucose (POCT)    Collection Time: 02/20/22  7:55 AM   Result Value Ref Range    POC Glucose 88 65 - 140 mg/dl   Fingerstick Glucose (POCT)    Collection Time: 02/20/22 10:46 AM   Result Value Ref Range    POC Glucose 154 (H) 65 - 140 mg/dl   Fingerstick Glucose (POCT)    Collection Time: 02/20/22  4:20 PM   Result Value Ref Range    POC Glucose 232 (H) 65 - 140 mg/dl   Fingerstick Glucose (POCT)    Collection Time: 02/20/22  8:31 PM   Result Value Ref Range    POC Glucose 317 (H) 65 - 140 mg/dl   CBC    Collection Time: 02/21/22  4:50 AM   Result Value Ref Range    WBC 17 79 (H) 4 31 - 10 16 Thousand/uL    RBC 4 31 3 88 - 5 62 Million/uL    Hemoglobin 10 0 (L) 12 0 - 17 0 g/dL    Hematocrit 32 0 (L) 36 5 - 49 3 %    MCV 74 (L) 82 - 98 fL    MCH 23 2 (L) 26 8 - 34 3 pg    MCHC 31 3 (L) 31 4 - 37 4 g/dL    RDW 18 2 (H) 11 6 - 15 1 %    Platelets 490 (H) 899 - 390 Thousands/uL    MPV 9 8 8 9 - 12 7 fL   Basic metabolic panel    Collection Time: 02/21/22  4:50 AM   Result Value Ref Range    Sodium 132 (L) 136 - 145 mmol/L    Potassium 4 7 3 5 - 5 3 mmol/L    Chloride 99 (L) 100 - 108 mmol/L    CO2 24 21 - 32 mmol/L    ANION GAP 9 4 - 13 mmol/L    BUN 16 5 - 25 mg/dL    Creatinine 1 43 (H) 0 60 - 1 30 mg/dL    Glucose 154 (H) 65 - 140 mg/dL    Calcium 8 8 8 3 - 10 1 mg/dL    eGFR 53 ml/min/1 73sq m   Fingerstick Glucose (POCT)    Collection Time: 02/21/22  7:13 AM   Result Value Ref Range    POC Glucose 149 (H) 65 - 140 mg/dl

## 2022-02-21 NOTE — CASE MANAGEMENT
Case Management Discharge Planning Note    Patient name Sebastian Spain  Location /-52 MRN [id number]  : 1965 Date 2022       Current Admission Date: 2022  Current Admission Diagnosis:Colonic mass   Patient Active Problem List    Diagnosis Date Noted    Hypokalemia 2022    Transaminitis 2022    Stage 3a chronic kidney disease ([facility] ) 2022    Type 2 [facility] mellitus with hyper[facility] 2022    Colonic mass 2022    Microcytic anemia 2022    Left ureteral calculus 2020    Incarcerated umbilical hernia     Morbid obesity ([facility] ) 2018    Testicular hypogonadism 2017    Low testosterone 2017    Type 2 diabetes mellitus without complication, without long-term current use of insulin ([facility] ) 2016    Benign essential hypertension 2016    Mixed hyperlipidemia 2016    Erectile dysfunction 2016      LOS (days): 3  Geometric Mean LOS (GMLOS) (days):   Days to GMLOS:     OBJECTIVE:  Risk of Unplanned Readmission Score: 12         Current admission status: Inpatient   Preferred Pharmacy:   [facility]/pharmacy #[id number] [facility] [address]  [address] [place] [address]  Phone: [phone] Fax: Hernando Araya [address] [facility]  [address] [place] [address] [address] [place] [address] [address]  Phone: [phone] Fax: [phone]    [facility] [address], [address] [facility] [address]  [address] [facility] [address]  Phone: [phone] Fax: [phone]    [facility] [address], [address] [facility] [address]  Phone: [phone] Fax: [phone]    [facility]/pharmacy #[id number]- [place] [address]  [address] [place] [address]  Phone: [phone] Fax: [phone]    [address] [place] [facility] [place] [address]  [place] Baez [address]  Phone: [phone] Fax: [phone]    [id number] - [address], [address]  Phone: [phone] Fax: [phone]    [facility] [address], [place]  [address]  Phone: [phone] Fax: [phone] - [address]  Phone: [phone] Fax: [phone]    [id number] - [address] [facility] [address]  Phone: [phone] Fax: [phone]    [facility] [address]  [facility] [address]  [place] [address]  Phone: [phone] Fax: [phone]    [id number] [address] [facility], [facility]  [address] [facility] [address] [place] [address]  Phone: [phone] Fax: [phone]    Primary Care Provider: Aliyah Mccoy MD    Primary Insurance: [employer]  Secondary Insurance:     DISCHARGE DETAILS:    Other Referral/Resources/Interventions Provided:  Interventions: Advanced Directives  Referral Comments: CM printed advanced directive/POA document and gave to patient at bedside  CM explained the form  Patient denied any questions or concerns at this time and will complete with two witnesses

## 2022-02-21 NOTE — ASSESSMENT & PLAN NOTE
Lab Results   Component Value Date    EGFR 53 02/21/2022    EGFR 62 02/20/2022    EGFR 64 02/19/2022    CREATININE 1 43 (H) 02/21/2022    CREATININE 1 27 02/20/2022    CREATININE 1 23 02/19/2022   · ALEXY on CKD  · Likely due to pre-renal  · Hold lisinopril  · IVF hydration

## 2022-02-22 LAB
ALBUMIN SERPL BCP-MCNC: 2.2 G/DL (ref 3.5–5)
ALP SERPL-CCNC: 246 U/L (ref 46–116)
ALT SERPL W P-5'-P-CCNC: 24 U/L (ref 12–78)
ANION GAP SERPL CALCULATED.3IONS-SCNC: 7 MMOL/L (ref 4–13)
AST SERPL W P-5'-P-CCNC: 49 U/L (ref 5–45)
BILIRUB SERPL-MCNC: 0.58 MG/DL (ref 0.2–1)
BUN SERPL-MCNC: 17 MG/DL (ref 5–25)
CALCIUM ALBUM COR SERPL-MCNC: 9.4 MG/DL (ref 8.3–10.1)
CALCIUM SERPL-MCNC: 8 MG/DL (ref 8.3–10.1)
CANCER AG19-9 SERPL-ACNC: 1848 U/ML (ref 0–35)
CHLORIDE SERPL-SCNC: 103 MMOL/L (ref 100–108)
CO2 SERPL-SCNC: 26 MMOL/L (ref 21–32)
CREAT SERPL-MCNC: 1.25 MG/DL (ref 0.6–1.3)
ERYTHROCYTE [DISTWIDTH] IN BLOOD BY AUTOMATED COUNT: 18.5 % (ref 11.6–15.1)
GFR SERPL CREATININE-BSD FRML MDRD: 63 ML/MIN/1.73SQ M
GLUCOSE SERPL-MCNC: 122 MG/DL (ref 65–140)
GLUCOSE SERPL-MCNC: 125 MG/DL (ref 65–140)
GLUCOSE SERPL-MCNC: 162 MG/DL (ref 65–140)
GLUCOSE SERPL-MCNC: 187 MG/DL (ref 65–140)
GLUCOSE SERPL-MCNC: 230 MG/DL (ref 65–140)
HCT VFR BLD AUTO: 28.4 % (ref 36.5–49.3)
HGB BLD-MCNC: 8.6 G/DL (ref 12–17)
MCH RBC QN AUTO: 22.7 PG (ref 26.8–34.3)
MCHC RBC AUTO-ENTMCNC: 30.3 G/DL (ref 31.4–37.4)
MCV RBC AUTO: 75 FL (ref 82–98)
PLATELET # BLD AUTO: 522 THOUSANDS/UL (ref 149–390)
PMV BLD AUTO: 9.6 FL (ref 8.9–12.7)
POTASSIUM SERPL-SCNC: 4.1 MMOL/L (ref 3.5–5.3)
PROT SERPL-MCNC: 6.1 G/DL (ref 6.4–8.2)
RBC # BLD AUTO: 3.79 MILLION/UL (ref 3.88–5.62)
SODIUM SERPL-SCNC: 136 MMOL/L (ref 136–145)
WBC # BLD AUTO: 12.59 THOUSAND/UL (ref 4.31–10.16)

## 2022-02-22 PROCEDURE — 85027 COMPLETE CBC AUTOMATED: CPT | Performed by: PHYSICIAN ASSISTANT

## 2022-02-22 PROCEDURE — 99232 SBSQ HOSP IP/OBS MODERATE 35: CPT | Performed by: INTERNAL MEDICINE

## 2022-02-22 PROCEDURE — 82948 REAGENT STRIP/BLOOD GLUCOSE: CPT

## 2022-02-22 PROCEDURE — 99024 POSTOP FOLLOW-UP VISIT: CPT | Performed by: SURGERY

## 2022-02-22 PROCEDURE — 80053 COMPREHEN METABOLIC PANEL: CPT | Performed by: INTERNAL MEDICINE

## 2022-02-22 RX ADMIN — AMLODIPINE BESYLATE 5 MG: 5 TABLET ORAL at 08:39

## 2022-02-22 RX ADMIN — HEPARIN SODIUM 5000 UNITS: 5000 INJECTION INTRAVENOUS; SUBCUTANEOUS at 05:42

## 2022-02-22 RX ADMIN — HEPARIN SODIUM 5000 UNITS: 5000 INJECTION INTRAVENOUS; SUBCUTANEOUS at 13:09

## 2022-02-22 RX ADMIN — INSULIN GLARGINE 15 UNITS: 100 INJECTION, SOLUTION SUBCUTANEOUS at 21:26

## 2022-02-22 RX ADMIN — PANTOPRAZOLE SODIUM 40 MG: 40 TABLET, DELAYED RELEASE ORAL at 08:39

## 2022-02-22 RX ADMIN — ASPIRIN 81 MG: 81 TABLET, COATED ORAL at 08:39

## 2022-02-22 RX ADMIN — INSULIN LISPRO 3 UNITS: 100 INJECTION, SOLUTION INTRAVENOUS; SUBCUTANEOUS at 13:10

## 2022-02-22 RX ADMIN — OXYCODONE HYDROCHLORIDE 5 MG: 5 TABLET ORAL at 15:10

## 2022-02-22 RX ADMIN — OXYCODONE HYDROCHLORIDE 5 MG: 5 TABLET ORAL at 06:15

## 2022-02-22 RX ADMIN — HEPARIN SODIUM 5000 UNITS: 5000 INJECTION INTRAVENOUS; SUBCUTANEOUS at 21:27

## 2022-02-22 RX ADMIN — INSULIN LISPRO 1 UNITS: 100 INJECTION, SOLUTION INTRAVENOUS; SUBCUTANEOUS at 21:27

## 2022-02-22 RX ADMIN — INSULIN LISPRO 1 UNITS: 100 INJECTION, SOLUTION INTRAVENOUS; SUBCUTANEOUS at 17:26

## 2022-02-22 RX ADMIN — AMLODIPINE BESYLATE 5 MG: 5 TABLET ORAL at 17:29

## 2022-02-22 RX ADMIN — OXYCODONE HYDROCHLORIDE 5 MG: 5 TABLET ORAL at 21:25

## 2022-02-22 RX ADMIN — ATORVASTATIN CALCIUM 40 MG: 40 TABLET, FILM COATED ORAL at 08:39

## 2022-02-22 RX ADMIN — SODIUM CHLORIDE 100 ML/HR: 0.9 INJECTION, SOLUTION INTRAVENOUS at 07:19

## 2022-02-22 RX ADMIN — PANTOPRAZOLE SODIUM 40 MG: 40 TABLET, DELAYED RELEASE ORAL at 17:26

## 2022-02-22 RX ADMIN — OXYCODONE HYDROCHLORIDE 10 MG: 10 TABLET ORAL at 09:54

## 2022-02-22 NOTE — WOUND OSTOMY CARE
Progress Note- Ostomy  Neeraj Hair 61 y o  male  [id number]  --01      Assessment:  Patient is POD # 2 of diverting loop colostomy placement due to obstructing colonic mass  Seen for ostomy education and teaching  Introduced self and role to patient  Patient is agreeable to visit  Patient is primary learner and eager to learn, wife is at bedside for teaching and pouch change education  Patient is alert and awake, states he has been participating in emptying and burping the pouch of stool, asked appropriate questions, was able to repeat information that was taught today, patient and Wife, Rangel Narayan, remained attentive throughout 100% of education today  Patient able to provide step by step instructions for pouch change       Topics reviewed: normal stoma appearance, how and when to empty (1/3 full) to prevent pulling/lifting of the barrier, importance & how to clean the end of the pouch to prevent odor, gas/odor producing foods, frequency of changing of the pouch (every 3-4 days on a schedule), burping, one piece/two piece pouching systems differences, open and closed end pouches, gas valve functionality of one piece, Clothing- including avoiding placing belt-line/seat belts over the stoma, bathing, and how to obtain supplies  Step-by-step pouch change done using 1 piece [employer] pouch  Reviewed with patient how and when to measure the stoma (weekly)  Demonstrated how to trace & cut one piece barrier, and how to apply it to the skin using gentle pressure / warmth of the hands  Skin prep applied to james-stomal skin, rationale explained to patient  Good seal obtained  RUQ loop colostomy: Stoma measures 2 inches, os is noted toward 9 o'clock, round in shape, is beefy red in color with scattered areas of brown and yellow slough, support bar in place and sutures intact that keep the bar in place, mucocutaneous junction is intact  James-stomal skin is intact with no redness or wounds   Effluent is moderate amount of soft brown stool, positive for gas output  Plan is for patient to have VNA at home  Patient verbalized understanding of education and teaching  Patient is active learner  All questions and concerns answered  Encouraged patient to continue to read the educational materials provided - "Life after colostomy Surgery" by [facility]  Patient gave verbal permission to order sample kits from [facility]      Plan:   1  Will continue to follow with patient while an inpatient  2  Encourage patient to participate in emptying and burping pouch  3  Encourage patient to ambulate  4  Education materials left at bedside  5  Empty pouch when 1/3 full  6  Change pouch if signs of leaking  Ostomy Care:  1  Peel back pouch using push-pull method, may use non-alcohol adhesive remover(Purple and white package)  2  Use warm water only to cleanse skin around the stoma (james-stomal skin)  3  Make sure all adhesive residue is removed and skin is dry and not oily  4  Measure stoma size using measuring guide and trace correct measurements onto back of pouch  5  Then cut backing of pouch out to correct shape/size  6  Use 3M no sting barrier film to prep the skin around the stoma  7  Place pouch over stoma and onto skin  8  Use warmth of hand to apply gentle pressure to help backing of pouch to adhere well to skin  Colostomy Loop RUQ (Active)   Stomal Appliance 1 piece; Changed 02/22/22 1330   Stoma Assessment Red;Brown 02/22/22 1330   Stoma size (in) 2 in 02/22/22 1330   Stoma Shape Round;Budded 02/22/22 1330   Peristomal Assessment Clean; Intact 02/22/22 1330   Treatment Bag change;Site care 02/22/22 1330   Output (mL)     Number of days: 2         Call or tigertext with any questions  Wound and Ostomy Care will continue to follow    Reggie Hercules RN BSN  Wound and Ostomy care

## 2022-02-22 NOTE — ASSESSMENT & PLAN NOTE
Lab Results   Component Value Date    EGFR 63 02/22/2022    EGFR 53 02/21/2022    EGFR 62 02/20/2022    CREATININE 1 25 02/22/2022    CREATININE 1 43 (H) 02/21/2022    CREATININE 1 27 02/20/2022   · ALEXY on CKD  · Likely due to pre-renal  · Hold lisinopril  · IVF hydration to continue

## 2022-02-22 NOTE — PLAN OF CARE
Problem: Potential for Falls  Goal: Patient will remain free of falls  Description: INTERVENTIONS:  - Educate patient/family on patient safety including physical limitations  - Instruct patient to call for assistance with activity   - Consult OT/PT to assist with strengthening/mobility   - Keep Call bell within reach  - Keep bed low and locked with side rails adjusted as appropriate  - Keep care items and personal belongings within reach  - Initiate and maintain comfort rounds  - Make Fall Risk Sign visible to staff  - Offer Toileting every 2 Hours, in advance of need  - Initiate/Maintain 24alarm  - Obtain necessary fall risk management equipment:  - Apply yellow socks and bracelet for high fall risk patients  - Consider moving patient to room near nurses station  Outcome: Progressing     Problem: PAIN - ADULT  Goal: Verbalizes/displays adequate comfort level or baseline comfort level  Description: Interventions:  - Encourage patient to monitor pain and request assistance  - Assess pain using appropriate pain scale  - Administer analgesics based on type and severity of pain and evaluate response  - Implement non-pharmacological measures as appropriate and evaluate response  - Consider cultural and social influences on pain and pain management  - Notify physician/advanced practitioner if interventions unsuccessful or patient reports new pain  Outcome: Progressing     Problem: INFECTION - ADULT  Goal: Absence or prevention of progression during hospitalization  Description: INTERVENTIONS:  - Assess and monitor for signs and symptoms of infection  - Monitor lab/diagnostic results  - Monitor all insertion sites, i e  indwelling lines, tubes, and drains  - Monitor endotracheal if appropriate and nasal secretions for changes in amount and color  - Radom appropriate cooling/warming therapies per order  - Administer medications as ordered  - Instruct and encourage patient and family to use good hand hygiene technique  - Identify and instruct in appropriate isolation precautions for identified infection/condition  Outcome: Progressing  Goal: Absence of fever/infection during neutropenic period  Description: INTERVENTIONS:  - Monitor WBC    Outcome: Progressing     Problem: SAFETY ADULT  Goal: Patient will remain free of falls  Description: INTERVENTIONS:  - Educate patient/family on patient safety including physical limitations  - Instruct patient to call for assistance with activity   - Consult OT/PT to assist with strengthening/mobility   - Keep Call bell within reach  - Keep bed low and locked with side rails adjusted as appropriate  - Keep care items and personal belongings within reach  - Initiate and maintain comfort rounds  - Make Fall Risk Sign visible to staff  - Offer Toileting every 2 Hours, in advance of need  - Initiate/Maintain 24alarm  - Obtain necessary fall risk management equipment:  - Apply yellow socks and bracelet for high fall risk patients  - Consider moving patient to room near nurses station  Outcome: Progressing  Goal: Maintain or return to baseline ADL function  Description: INTERVENTIONS:  -  Assess patient's ability to carry out ADLs; assess patient's baseline for ADL function and identify physical deficits which impact ability to perform ADLs (bathing, care of mouth/teeth, toileting, grooming, dressing, etc )  - Assess/evaluate cause of self-care deficits   - Assess range of motion  - Assess patient's mobility; develop plan if impaired  - Assess patient's need for assistive devices and provide as appropriate  - Encourage maximum independence but intervene and supervise when necessary  - Involve family in performance of ADLs  - Assess for home care needs following discharge   - Consider OT consult to assist with ADL evaluation and planning for discharge  - Provide patient education as appropriate  Outcome: Progressing  Goal: Maintains/Returns to pre admission functional level  Description: INTERVENTIONS:  - Perform BMAT or MOVE assessment daily    - Set and communicate daily mobility goal to care team and patient/family/caregiver  - Collaborate with rehabilitation services on mobility goals if consulted  - Perform Range of Motion  times a day  - Reposition patient every  hours  - Dangle patient  times a day  - Stand patient  times a day  - Ambulate patient  times a day  - Out of bed to chair  times a day   - Out of bed for meals times a day  - Out of bed for toileting  - Record patient progress and toleration of activity level   Outcome: Progressing     Problem: DISCHARGE PLANNING  Goal: Discharge to home or other facility with appropriate resources  Description: INTERVENTIONS:  - Identify barriers to discharge w/patient and caregiver  - Arrange for needed discharge resources and transportation as appropriate  - Identify discharge learning needs (meds, wound care, etc )  - Arrange for interpretive services to assist at discharge as needed  - Refer to Case Management Department for coordinating discharge planning if the patient needs post-hospital services based on physician/advanced practitioner order or complex needs related to functional status, cognitive ability, or social support system  Outcome: Progressing     Problem: Knowledge Deficit  Goal: Patient/family/caregiver demonstrates understanding of disease process, treatment plan, medications, and discharge instructions  Description: Complete learning assessment and assess knowledge base    Interventions:  - Provide teaching at level of understanding  - Provide teaching via preferred learning methods  Outcome: Progressing     Problem: GASTROINTESTINAL - ADULT  Goal: Minimal or absence of nausea and/or vomiting  Description: INTERVENTIONS:  - Administer IV fluids if ordered to ensure adequate hydration  - Maintain NPO status until nausea and vomiting are resolved  - Nasogastric tube if ordered  - Administer ordered antiemetic medications as needed  - Provide nonpharmacologic comfort measures as appropriate  - Advance diet as tolerated, if ordered  - Consider nutrition services referral to assist patient with adequate nutrition and appropriate food choices  Outcome: Progressing  Goal: Maintains or returns to baseline bowel function  Description: INTERVENTIONS:  - Assess bowel function  - Encourage oral fluids to ensure adequate hydration  - Administer IV fluids if ordered to ensure adequate hydration  - Administer ordered medications as needed  - Encourage mobilization and activity  - Consider nutritional services referral to assist patient with adequate nutrition and appropriate food choices  Outcome: Progressing  Goal: Maintains adequate nutritional intake  Description: INTERVENTIONS:  - Monitor percentage of each meal consumed  - Identify factors contributing to decreased intake, treat as appropriate  - Assist with meals as needed  - Monitor I&O, weight, and lab values if indicated  - Obtain nutrition services referral as needed  Outcome: Progressing  Goal: Establish and maintain optimal ostomy function  Description: INTERVENTIONS:  - Assess bowel function  - Encourage oral fluids to ensure adequate hydration  - Administer IV fluids if ordered to ensure adequate hydration   - Administer ordered medications as needed  - Encourage mobilization and activity  - Nutrition services referral to assist patient with appropriate food choices  - Assess stoma site  - Consider wound care consult   Outcome: Progressing  Goal: Oral mucous membranes remain intact  Description: INTERVENTIONS  - Assess oral mucosa and hygiene practices  - Implement preventative oral hygiene regimen  - Implement oral medicated treatments as ordered  - Initiate Nutrition services referral as needed  Outcome: Progressing     Problem: Nutrition/Hydration-ADULT  Goal: Nutrient/Hydration intake appropriate for improving, restoring or maintaining nutritional needs  Description: Monitor and assess patient's nutrition/hydration status for malnutrition  Collaborate with interdisciplinary team and initiate plan and interventions as ordered  Monitor patient's weight and dietary intake as ordered or per policy  Utilize nutrition screening tool and intervene as necessary  Determine patient's food preferences and provide high-protein, high-caloric foods as appropriate       INTERVENTIONS:  - Monitor oral intake, urinary output, labs, and treatment plans  - Assess nutrition and hydration status and recommend course of action  - Evaluate amount of meals eaten  - Assist patient with eating if necessary   - Allow adequate time for meals  - Recommend/ encourage appropriate diets, oral nutritional supplements, and vitamin/mineral supplements  - Order, calculate, and assess calorie counts as needed  - Recommend, monitor, and adjust tube feedings and TPN based on assessed needs  - Assess need for intravenous fluids  - Provide  nutrition/hydration education as appropriate  - Include patient/family/caregiver in decisions related to nutrition  Outcome: Progressing

## 2022-02-22 NOTE — PLAN OF CARE
Problem: Potential for Falls  Goal: Patient will remain free of falls  Description: INTERVENTIONS:  - Educate patient/family on patient safety including physical limitations  - Instruct patient to call for assistance with activity   - Consult OT/PT to assist with strengthening/mobility   - Keep Call bell within reach  - Keep bed low and locked with side rails adjusted as appropriate  - Keep care items and personal belongings within reach  - Initiate and maintain comfort rounds  - Make Fall Risk Sign visible to staff  - Offer Toileting every 2 Hours, in advance of need  - Initiate/Maintain 24alarm  - Obtain necessary fall risk management equipment:  - Apply yellow socks and bracelet for high fall risk patients  - Consider moving patient to room near nurses station  Outcome: Progressing     Problem: PAIN - ADULT  Goal: Verbalizes/displays adequate comfort level or baseline comfort level  Description: Interventions:  - Encourage patient to monitor pain and request assistance  - Assess pain using appropriate pain scale  - Administer analgesics based on type and severity of pain and evaluate response  - Implement non-pharmacological measures as appropriate and evaluate response  - Consider cultural and social influences on pain and pain management  - Notify physician/advanced practitioner if interventions unsuccessful or patient reports new pain  Outcome: Progressing     Problem: INFECTION - ADULT  Goal: Absence or prevention of progression during hospitalization  Description: INTERVENTIONS:  - Assess and monitor for signs and symptoms of infection  - Monitor lab/diagnostic results  - Monitor all insertion sites, i e  indwelling lines, tubes, and drains  - Monitor endotracheal if appropriate and nasal secretions for changes in amount and color  - Barker appropriate cooling/warming therapies per order  - Administer medications as ordered  - Instruct and encourage patient and family to use good hand hygiene technique  - Identify and instruct in appropriate isolation precautions for identified infection/condition  Outcome: Progressing  Goal: Absence of fever/infection during neutropenic period  Description: INTERVENTIONS:  - Monitor WBC    Outcome: Progressing     Problem: SAFETY ADULT  Goal: Patient will remain free of falls  Description: INTERVENTIONS:  - Educate patient/family on patient safety including physical limitations  - Instruct patient to call for assistance with activity   - Consult OT/PT to assist with strengthening/mobility   - Keep Call bell within reach  - Keep bed low and locked with side rails adjusted as appropriate  - Keep care items and personal belongings within reach  - Initiate and maintain comfort rounds  - Make Fall Risk Sign visible to staff  - Offer Toileting every 2 Hours, in advance of need  - Initiate/Maintain 24alarm  - Obtain necessary fall risk management equipment:  - Apply yellow socks and bracelet for high fall risk patients  - Consider moving patient to room near nurses station  Outcome: Progressing  Goal: Maintain or return to baseline ADL function  Description: INTERVENTIONS:  -  Assess patient's ability to carry out ADLs; assess patient's baseline for ADL function and identify physical deficits which impact ability to perform ADLs (bathing, care of mouth/teeth, toileting, grooming, dressing, etc )  - Assess/evaluate cause of self-care deficits   - Assess range of motion  - Assess patient's mobility; develop plan if impaired  - Assess patient's need for assistive devices and provide as appropriate  - Encourage maximum independence but intervene and supervise when necessary  - Involve family in performance of ADLs  - Assess for home care needs following discharge   - Consider OT consult to assist with ADL evaluation and planning for discharge  - Provide patient education as appropriate  Outcome: Progressing  Goal: Maintains/Returns to pre admission functional level  Description: INTERVENTIONS:  - Perform BMAT or MOVE assessment daily    - Set and communicate daily mobility goal to care team and patient/family/caregiver  - Collaborate with rehabilitation services on mobility goals if consulted  - Perform Range of Motion  times a day  - Reposition patient every  hours  - Dangle patient  times a day  - Stand patient  times a day  - Ambulate patient  times a day  - Out of bed to chair  times a day   - Out of bed for meal times a day  - Out of bed for toileting  - Record patient progress and toleration of activity level   Outcome: Progressing     Problem: DISCHARGE PLANNING  Goal: Discharge to home or other facility with appropriate resources  Description: INTERVENTIONS:  - Identify barriers to discharge w/patient and caregiver  - Arrange for needed discharge resources and transportation as appropriate  - Identify discharge learning needs (meds, wound care, etc )  - Arrange for interpretive services to assist at discharge as needed  - Refer to Case Management Department for coordinating discharge planning if the patient needs post-hospital services based on physician/advanced practitioner order or complex needs related to functional status, cognitive ability, or social support system  Outcome: Progressing     Problem: Knowledge Deficit  Goal: Patient/family/caregiver demonstrates understanding of disease process, treatment plan, medications, and discharge instructions  Description: Complete learning assessment and assess knowledge base    Interventions:  - Provide teaching at level of understanding  - Provide teaching via preferred learning methods  Outcome: Progressing     Problem: GASTROINTESTINAL - ADULT  Goal: Minimal or absence of nausea and/or vomiting  Description: INTERVENTIONS:  - Administer IV fluids if ordered to ensure adequate hydration  - Maintain NPO status until nausea and vomiting are resolved  - Nasogastric tube if ordered  - Administer ordered antiemetic medications as needed  - Provide nonpharmacologic comfort measures as appropriate  - Advance diet as tolerated, if ordered  - Consider nutrition services referral to assist patient with adequate nutrition and appropriate food choices  Outcome: Progressing  Goal: Maintains or returns to baseline bowel function  Description: INTERVENTIONS:  - Assess bowel function  - Encourage oral fluids to ensure adequate hydration  - Administer IV fluids if ordered to ensure adequate hydration  - Administer ordered medications as needed  - Encourage mobilization and activity  - Consider nutritional services referral to assist patient with adequate nutrition and appropriate food choices  Outcome: Progressing  Goal: Maintains adequate nutritional intake  Description: INTERVENTIONS:  - Monitor percentage of each meal consumed  - Identify factors contributing to decreased intake, treat as appropriate  - Assist with meals as needed  - Monitor I&O, weight, and lab values if indicated  - Obtain nutrition services referral as needed  Outcome: Progressing  Goal: Establish and maintain optimal ostomy function  Description: INTERVENTIONS:  - Assess bowel function  - Encourage oral fluids to ensure adequate hydration  - Administer IV fluids if ordered to ensure adequate hydration   - Administer ordered medications as needed  - Encourage mobilization and activity  - Nutrition services referral to assist patient with appropriate food choices  - Assess stoma site  - Consider wound care consult   Outcome: Progressing  Goal: Oral mucous membranes remain intact  Description: INTERVENTIONS  - Assess oral mucosa and hygiene practices  - Implement preventative oral hygiene regimen  - Implement oral medicated treatments as ordered  - Initiate Nutrition services referral as needed  Outcome: Progressing     Problem: Nutrition/Hydration-ADULT  Goal: Nutrient/Hydration intake appropriate for improving, restoring or maintaining nutritional needs  Description: Monitor and assess patient's nutrition/hydration status for malnutrition  Collaborate with interdisciplinary team and initiate plan and interventions as ordered  Monitor patient's weight and dietary intake as ordered or per policy  Utilize nutrition screening tool and intervene as necessary  Determine patient's food preferences and provide high-protein, high-caloric foods as appropriate       INTERVENTIONS:  - Monitor oral intake, urinary output, labs, and treatment plans  - Assess nutrition and hydration status and recommend course of action  - Evaluate amount of meals eaten  - Assist patient with eating if necessary   - Allow adequate time for meals  - Recommend/ encourage appropriate diets, oral nutritional supplements, and vitamin/mineral supplements  - Order, calculate, and assess calorie counts as needed  - Recommend, monitor, and adjust tube feedings and TPN based on assessed needs  - Assess need for intravenous fluids  - Provide  nutrition/hydration education as appropriate  - Include patient/family/caregiver in decisions related to nutrition  Outcome: Progressing

## 2022-02-22 NOTE — ASSESSMENT & PLAN NOTE
Results from last 7 days   Lab Units 02/22/22  0453 02/21/22  0450 02/20/22  0606 02/19/22  0500 02/18/22  1522   HEMOGLOBIN g/dL 8 6* 10 0* 10 6* 9 3* 10 9*   · ABLA likely bleeding from colonic mass  · No overt sign of acute blood loss  · Cont to monitor, transfuse if < 7  · H/H remains stable

## 2022-02-22 NOTE — PROGRESS NOTES
Progress Note - General Surgery   Eloise Mane 61 y o  male MRN: [id number]  Unit/Bed#: -01 Encounter: [id number]    Assessment:  Eloise Mane is a 61 y o  male POD2 s/p laparoscopic formation of diverting colostomy for obstructing colonic mass  Stoma tender and moderately inflamed  Colostomy- 275mL    Plan:  · Advance diet as tolerated  · Wound care for colostomy education  · OOB/ambulate  · Pain control per primary team    Subjective/Objective    Subjective: Pt admits to tenderness of stoma  No abdominal pain  Eating without difficulty  No other complaints  Objective:     Blood pressure 143/91, pulse 86, temperature 98 °F (36 7 °C), resp  rate 18, height 5' 10" (1 778 m), weight 133 kg (293 lb 3 4 oz), SpO2 92 %  ,Body mass index is 42 07 kg/m²  Intake/Output Summary (Last 24 hours) at 2/22/2022 1047  Last data filed at 2/22/2022 0901  Gross per 24 hour   Intake 3400 ml   Output 1405 ml   Net 1995 ml       Invasive Devices  Report    Peripheral Intravenous Line            Peripheral IV 02/18/22 Right Antecubital 3 days    Peripheral IV 02/20/22 Dorsal (posterior); Left Hand 2 days          Drain            Colostomy Loop RUQ 2 days                Physical Exam:   GEN: NAD  HEENT: NCAT, MMM  CV: RRR, no m/r/g  Lung: Normal effort, CTA B/L, no w/r/r  Ab: Soft, ND  Ostomy functioning  Stoma tender and moderately inflamed  Extrem: No CCE   Neuro: A+Ox3     Lab, Imaging and other studies:I have personally reviewed pertinent lab results       VTE Pharmacologic Prophylaxis: Heparin  VTE Mechanical Prophylaxis: sequential compression device    Recent Results (from the past 36 hour(s))   CBC    Collection Time: 02/21/22  4:50 AM   Result Value Ref Range    WBC 17 79 (H) 4 31 - 10 16 Thousand/uL    RBC 4 31 3 88 - 5 62 Million/uL    Hemoglobin 10 0 (L) 12 0 - 17 0 g/dL    Hematocrit 32 0 (L) 36 5 - 49 3 %    MCV 74 (L) 82 - 98 fL    MCH 23 2 (L) 26 8 - 34 3 pg    MCHC 31 3 (L) 31 4 - 37 4 g/dL RDW 18 2 (H) 11 6 - 15 1 %    Platelets 048 (H) 045 - 390 Thousands/uL    MPV 9 8 8 9 - 12 7 fL   Basic metabolic panel    Collection Time: 02/21/22  4:50 AM   Result Value Ref Range    Sodium 132 (L) 136 - 145 mmol/L    Potassium 4 7 3 5 - 5 3 mmol/L    Chloride 99 (L) 100 - 108 mmol/L    CO2 24 21 - 32 mmol/L    ANION GAP 9 4 - 13 mmol/L    BUN 16 5 - 25 mg/dL    Creatinine 1 43 (H) 0 60 - 1 30 mg/dL    Glucose 154 (H) 65 - 140 mg/dL    Calcium 8 8 8 3 - 10 1 mg/dL    eGFR 53 ml/min/1 73sq m   Fingerstick Glucose (POCT)    Collection Time: 02/21/22  7:13 AM   Result Value Ref Range    POC Glucose 149 (H) 65 - 140 mg/dl   Fingerstick Glucose (POCT)    Collection Time: 02/21/22 11:37 AM   Result Value Ref Range    POC Glucose 171 (H) 65 - 140 mg/dl   Fingerstick Glucose (POCT)    Collection Time: 02/21/22  3:47 PM   Result Value Ref Range    POC Glucose 178 (H) 65 - 140 mg/dl   Fingerstick Glucose (POCT)    Collection Time: 02/21/22  8:53 PM   Result Value Ref Range    POC Glucose 185 (H) 65 - 140 mg/dl   CBC    Collection Time: 02/22/22  4:53 AM   Result Value Ref Range    WBC 12 59 (H) 4 31 - 10 16 Thousand/uL    RBC 3 79 (L) 3 88 - 5 62 Million/uL    Hemoglobin 8 6 (L) 12 0 - 17 0 g/dL    Hematocrit 28 4 (L) 36 5 - 49 3 %    MCV 75 (L) 82 - 98 fL    MCH 22 7 (L) 26 8 - 34 3 pg    MCHC 30 3 (L) 31 4 - 37 4 g/dL    RDW 18 5 (H) 11 6 - 15 1 %    Platelets 781 (H) 935 - 390 Thousands/uL    MPV 9 6 8 9 - 12 7 fL   Comprehensive metabolic panel    Collection Time: 02/22/22  4:53 AM   Result Value Ref Range    Sodium 136 136 - 145 mmol/L    Potassium 4 1 3 5 - 5 3 mmol/L    Chloride 103 100 - 108 mmol/L    CO2 26 21 - 32 mmol/L    ANION GAP 7 4 - 13 mmol/L    BUN 17 5 - 25 mg/dL    Creatinine 1 25 0 60 - 1 30 mg/dL    Glucose 122 65 - 140 mg/dL    Calcium 8 0 (L) 8 3 - 10 1 mg/dL    Corrected Calcium 9 4 8 3 - 10 1 mg/dL    AST 49 (H) 5 - 45 U/L    ALT 24 12 - 78 U/L    Alkaline Phosphatase 246 (H) 46 - 116 U/L    Total Protein 6 1 (L) 6 4 - 8 2 g/dL    Albumin 2 2 (L) 3 5 - 5 0 g/dL    Total Bilirubin 0 58 0 20 - 1 00 mg/dL    eGFR 63 ml/min/1 73sq m   Fingerstick Glucose (POCT)    Collection Time: 02/22/22  7:58 AM   Result Value Ref Range    POC Glucose 125 65 - 140 mg/dl

## 2022-02-22 NOTE — TELEPHONE ENCOUNTER
Intake received  Insurance education outreach to follow    03/04/22  Pt has an active [place] plan that runs on a gisela year  Effective 10/01/04  plan has in & out of network benefits  Per RTE   No deduct  No out of pocket    Will call the ins to verify that  Called ins & spoke to nida call ref# [id number] pt has no deduct & no out of pocket all services will be covered 100%     Called pt to go over the benefits & he thanked me for the call

## 2022-02-22 NOTE — ASSESSMENT & PLAN NOTE
79-year-old gentleman with history of diabetes mellitus and hypertension who presents to the hospital worsening abdominal pain and bowel irregularities  In the ED imaging concerning for new colon mass with hepatic metastasis  · Apple core lesion noted on imaging  · GI, Gen surgery on board  · Given obstructive quality, status post diverting colostomy and hepatic lesion biopsy postop day 1  · Oncology on board  · S/p CT chest, no definite metastasis in the chest  + pulmonary nodules, repeat ct in 3 months  · Bx pending  Per oncology to arrange for systemic chemo as outpt  · Diet to be advanced per Gen surg  · S/p wound care/ostomy consult for ostomy care and teaching

## 2022-02-22 NOTE — CASE MANAGEMENT
Case Management Discharge Planning Note    Patient name Judy Cervantes  Location /-66 MRN [id number]  : 1965 Date 2022       Current Admission Date: 2022  Current Admission Diagnosis:Colonic mass   Patient Active Problem List    Diagnosis Date Noted    Thrombocytosis 2022    Hypokalemia 2022    Transaminitis 2022    Stage 3a chronic kidney disease ([facility] [address] [facility] ) 2022    Type 2 diabetes mellitus with hyperlipidemia ([facility] ) 2022    Colonic mass 2022    Microcytic anemia 2022    Left ureteral calculus 2020    Incarcerated umbilical hernia     [facility] 2018    Testicular hypogonadism 2017    Low testosterone 2017    Type 2 diabetes mellitus without complication, without long-term current use of insulin ([facility] ) 2016    Benign essential hypertension 2016    Mixed hyperlipidemia 2016    Erectile dysfunction 2016      LOS (days): 4  Geometric Mean LOS (GMLOS) (days):   Days to GMLOS:     OBJECTIVE:  Risk of Unplanned Readmission Score: 13         Current admission status: Inpatient   Preferred Pharmacy:   [id number] [address]  [address] [address]  Phone: [phone] Fax: Hernando Araya  [address] [place] [facility] [address] [place] [address]   [facility] [address] [address]  Phone: [phone] Fax: [phone]    [place] [address] [place], [place] - [address] [place] [address] [facility] [address] [facility] [address]  Phone: [phone] Fax: [phone]    [facility] [address] [place] [address] [place] [address]  Phone: [phone] Fax: [phone]    [id number]- [place]ruthi Neil - [address]  [address] [address]  Phone: [phone] Fax: [phone]    [facility] [address] [place] [facility] - [address] [facility] [address]  Phone: [phone] Fax: [phone] - [facility], [address]  Phone: [phone] Fax: [phone]    [facility] [address], [place]  [address]  Phone: [phone] Fax: [phone] - [address]  Phone: [phone] Fax: [phone]    [id number] [place] - [address] [facility] [address]  Phone: [phone] Fax: July 21, [facility]  [address] [facility] [address]  Phone: [phone] Fax: [phone]    [id number]- [address], [facility]  [address] [facility] [address] [place] [address]  Phone: [phone] Fax: [phone]    Primary Care Provider: Amos Segundo MD    Primary Insurance: [employer]  Secondary Insurance:     DISCHARGE DETAILS:    [address]         Is the patient interested in [facility] at discharge?: Yes  [address] requested[de-identified] [address] Name[de-identified] [address] Provider[de-identified] PCP  Home Health Services Needed[de-identified] Wound/Ostomy Care,Post-Op Care and Assessment  Homebound Criteria Met[de-identified] Uses an Assist Device (i e  cane, walker, etc)  Supporting Clincal Findings[de-identified] Limited Endurance,Fatigues Easliy in Short Distances    Treatment Team Recommendation[facility]  Discharge Destination Plan[de-identified] Home with Mildred at Discharge : Family

## 2022-02-22 NOTE — PROGRESS NOTES
[place]  Progress Note - Celena Herrera 1965, 61 y o  male MRN: [id number]  Unit/Bed#: -01 Encounter: [id number]  Primary Care Provider: Shlomo Fitzgerald MD   Date and time admitted to hospital: 2/18/2022  3:09 PM    Thrombocytosis  Assessment & Plan  Likely reactive  Cont to monitor closely    Stage 3a chronic kidney disease [facility])  Assessment & Plan  Lab Results   Component Value Date    EGFR 63 02/22/2022    EGFR 53 02/21/2022    EGFR 62 02/20/2022    CREATININE 1 25 02/22/2022    CREATININE 1 43 (H) 02/21/2022    CREATININE 1 27 02/20/2022   · ALEXY on CKD  · Likely due to pre-renal  · Hold lisinopril  · IVF hydration to continue    Transaminitis  Assessment & Plan  · Secondary to hepatic metastasis,    Microcytic anemia  Assessment & Plan    Results from last 7 days   Lab Units 02/22/22  0453 02/21/22  0450 02/20/22  0606 02/19/22  0500 02/18/22  1522   HEMOGLOBIN g/dL 8 6* 10 0* 10 6* 9 3* 10 9*   · ABLA likely bleeding from colonic mass  · No overt sign of acute blood loss  · Cont to monitor, transfuse if < 7  · H/H remains stable  Type 2 diabetes mellitus with hyperlipidemia [facility])  Assessment & Plan  Lab Results   Component Value Date    HGBA1C 7 5 (H) 02/10/2022       Recent Labs     02/21/22  1547 02/21/22  2053 02/22/22  0758 02/22/22  1117   POCGLU 178* 185* 125 230*   · During hospitalization hold oral agents  · Resumed back on lantus at lower dose until diet is fully advanced  · Hypoglycemic protocol  · Cont ISS for now    Morbid obesity ([facility] [address] )  Assessment & Plan  BMI of 42 07  Lifestyle/dietary modification to continue    Benign essential hypertension  Assessment & Plan  · Cont amlodipine  · Holding lisinopril due to elevated cr  · BP stable    Blood pressure 103/77, pulse 101, temperature 98 9 °F (37 2 °C), resp  rate 18, height 5' 10" (1 778 m), weight 133 kg (293 lb 3 4 oz), SpO2 96 %          * Colonic mass  Assessment & Plan  62-year-old gentleman with history of diabetes mellitus and hypertension who presents to the hospital worsening abdominal pain and bowel irregularities  In the ED imaging concerning for new colon mass with hepatic metastasis  · Apple core lesion noted on imaging  · GI, Gen surgery on board  · Given obstructive quality, status post diverting colostomy and hepatic lesion biopsy postop day 1  · Oncology on board  · S/p CT chest, no definite metastasis in the chest  + pulmonary nodules, repeat ct in 3 months  · Bx pending  Per oncology to arrange for systemic chemo as outpt  · Diet to be advanced per Gen surg  · S/p wound care/ostomy consult for ostomy care and teaching  TE Pharmacologic Prophylaxis: Heparin    Patient Centered Rounds: I have performed bedside rounds with nursing staff today  Discussions with Specialists or Other Care Team Provider:  Care team, surgery, wound care nurse  Education and Discussions with Family / Patient:  Patient    Current Length of Stay: 4 day(s)  Current Patient Status: Inpatient     Certification Statement: The patient will continue to require additional inpatient hospital stay due to Colonic mass  Discharge Plan / Estimated Discharge Date:  1-2 days    Code Status: Level 1 - Full Code  ______________________________________________________________________________    Subjective:   Patient seen and examined by me  No chest pain, palpitations or diaphoresis at this point of time  Undergoing colostomy teaching  Patient is anxious and does not feel ready to go home  Mild abdominal discomfort  No fever or chills    Objective:   Vitals: Blood pressure 103/77, pulse 101, temperature 98 9 °F (37 2 °C), resp  rate 18, height 5' 10" (1 778 m), weight 133 kg (293 lb 3 4 oz), SpO2 96 %      Physical Exam:   General appearance: alert, appears stated age and cooperative  Constitutional- looks a little weak  HEENT - atraumatic and normocephalic  Neck- supple  Skin - no fresh rash  Extremities no fresh focal deformities  Cardiovascular- S1-S2 heard  Respiratory- bilateral air entry present, no crackles or rhonchi  Skin - no fresh rash  Abdomen - normal bowel sounds present, no rebound tenderness, has colostomy  CNS- No fresh focal deficits  Psych- no acute psychosis     Additional Data:   Labs:  Results from last 7 days   Lab Units 02/22/22  0453 02/21/22  0450 02/20/22  0606 02/19/22  0500 02/18/22  1522   WBC Thousand/uL 12 59* 17 79* 13 22* 12 13* 15 66*   HEMOGLOBIN g/dL 8 6* 10 0* 10 6* 9 3* 10 9*   HEMATOCRIT % 28 4* 32 0* 33 3* 30 4* 35 8*   MCV fL 75* 74* 74* 74* 74*   PLATELETS Thousands/uL 522* 742* 723* 739* 957*     Results from last 7 days   Lab Units 02/22/22  0453 02/21/22  0450 02/20/22  0606 02/19/22  0500 02/18/22  1522   SODIUM mmol/L 136 132* 135* 134* 136   POTASSIUM mmol/L 4 1 4 7 3 6 3 3* 3 5   CHLORIDE mmol/L 103 99* 100 99* 98*   CO2 mmol/L 26 24 24 27 26   ANION GAP mmol/L 7 9 11 8 12   BUN mg/dL 17 16 16 16 22   CREATININE mg/dL 1 25 1 43* 1 27 1 23 1 57*   CALCIUM mg/dL 8 0* 8 8 8 8 8 7 9 1   ALBUMIN g/dL 2 2*  --   --  2 5* 3 0*   TOTAL BILIRUBIN mg/dL 0 58  --   --  0 59 0 73   ALK PHOS U/L 246*  --   --  275* 349*   ALT U/L 24  --   --  26 37   AST U/L 49*  --   --  49* 67*   EGFR ml/min/1 73sq m 63 53 62 64 48   GLUCOSE RANDOM mg/dL 122 154* 82 64* 115                      Results from last 7 days   Lab Units 02/22/22  1117 02/22/22  0758 02/21/22 2053 02/21/22  1547 02/21/22  1137 02/21/22  0713 02/20/22  2031 02/20/22  1620 02/20/22  1046 02/20/22  0755 02/19/22 2054 02/19/22  1606   POC GLUCOSE mg/dl 230* 125 185* 178* 171* 149* 317* 232* 154* 88 213* 194*             * I Have Reviewed All Lab Data Listed Above      Cultures:               Imaging:  Imaging Reports Reviewed Today Include:   CT chest wo contrast    Result Date: 2/20/2022  Impression: No definite metastatic disease in the chest   Scattered tiny calcified and noncalcified bilateral nodules, the largest measuring 6 mm in the right upper lobe, likely with a small amount of rim calcification  Based on current Fleischner Society 2017 Guidelines on incidental pulmonary nodule, patients  with a known malignancy are at increased risk of metastasis and should receive initial three month follow-up chest CT  The study was marked in [facility] for immediate notification  Workstation performed: [id number]     CT abdomen pelvis with contrast    Result Date: 2/18/2022  Impression: 1  Distal transverse colonic apple core lesion causing a degree of obstruction with dilated upstream colon with mild surrounding fat stranding  Small foci of gas peripheral to colonic stool may represent trapped air; however, pneumatosis cannot be entirely excluded  2   Innumerable hepatic metastases  3   Nonenlarged mesenteric lymph nodes adjacent to the colonic mass, concerning for metastatic disease, given their location  The study was marked in [facility] for immediate notification   Workstation performed: [id number]     Scheduled Meds:  Current Facility-Administered Medications   Medication Dose Route Frequency Provider Last Rate    acetaminophen  650 mg Oral Q6H PRN Jade Arnold PA-C      amLODIPine  5 mg Oral BID Petrona Arnold PA-C      aspirin  81 mg Oral Daily ANJELICA Mullen-BHAVANA      atorvastatin  40 mg Oral Daily Jade Arnold PA-C      heparin (porcine)  5,000 Units Subcutaneous [facility] Petrona Arnold PA-C      HYDROmorphone  0 5 mg Intravenous Q4H PRN Petrona Arnold PA-C      insulin glargine  15 Units Subcutaneous HS Jenna Russell DO      insulin lispro  1-6 Units Subcutaneous 4x Daily (AC & HS) Petrona Arnold PA-C      ondansetron  4 mg Intravenous Q4H PRN Petrona Arnold PA-C      oxyCODONE  10 mg Oral Q4H PRN Petrona Arnold PA-C      oxyCODONE  5 mg Oral Q4H PRN Petrona Arnold PA-C      pantoprazole  40 mg Oral BID AC NANETTE Mullen MD  [facility]    ** Please Note: This note has been constructed using a voice recognition system   ** Oriented - self; Oriented - place; Oriented - time

## 2022-02-22 NOTE — ASSESSMENT & PLAN NOTE
Lab Results   Component Value Date    HGBA1C 7 5 (H) 02/10/2022       Recent Labs     02/21/22  1547 02/21/22  2053 02/22/22  0758 02/22/22  1117   POCGLU 178* 185* 125 230*   · During hospitalization hold oral agents    · Resumed back on lantus at lower dose until diet is fully advanced  · Hypoglycemic protocol  · Cont ISS for now

## 2022-02-22 NOTE — ASSESSMENT & PLAN NOTE
· Cont amlodipine  · Holding lisinopril due to elevated cr  · BP stable    Blood pressure 103/77, pulse 101, temperature 98 9 °F (37 2 °C), resp  rate 18, height 5' 10" (1 778 m), weight 133 kg (293 lb 3 4 oz), SpO2 96 %

## 2022-02-23 ENCOUNTER — TELEPHONE (OUTPATIENT)
Dept: INTERNAL MEDICINE CLINIC | Facility: CLINIC | Age: 57
End: 2022-02-23

## 2022-02-23 LAB
ALBUMIN SERPL BCP-MCNC: 2.3 G/DL (ref 3.5–5)
ALP SERPL-CCNC: 264 U/L (ref 46–116)
ALT SERPL W P-5'-P-CCNC: 26 U/L (ref 12–78)
ANION GAP SERPL CALCULATED.3IONS-SCNC: 11 MMOL/L (ref 4–13)
APTT PPP: 39 SECONDS (ref 23–37)
AST SERPL W P-5'-P-CCNC: 49 U/L (ref 5–45)
BILIRUB SERPL-MCNC: 0.6 MG/DL (ref 0.2–1)
BUN SERPL-MCNC: 17 MG/DL (ref 5–25)
CALCIUM ALBUM COR SERPL-MCNC: 9.9 MG/DL (ref 8.3–10.1)
CALCIUM SERPL-MCNC: 8.5 MG/DL (ref 8.3–10.1)
CHLORIDE SERPL-SCNC: 101 MMOL/L (ref 100–108)
CO2 SERPL-SCNC: 25 MMOL/L (ref 21–32)
CREAT SERPL-MCNC: 1.32 MG/DL (ref 0.6–1.3)
ERYTHROCYTE [DISTWIDTH] IN BLOOD BY AUTOMATED COUNT: 18.2 % (ref 11.6–15.1)
GFR SERPL CREATININE-BSD FRML MDRD: 59 ML/MIN/1.73SQ M
GLUCOSE SERPL-MCNC: 185 MG/DL (ref 65–140)
GLUCOSE SERPL-MCNC: 216 MG/DL (ref 65–140)
GLUCOSE SERPL-MCNC: 221 MG/DL (ref 65–140)
GLUCOSE SERPL-MCNC: 235 MG/DL (ref 65–140)
GLUCOSE SERPL-MCNC: 246 MG/DL (ref 65–140)
HCT VFR BLD AUTO: 29.8 % (ref 36.5–49.3)
HGB BLD-MCNC: 9.3 G/DL (ref 12–17)
INR PPP: 1.18 (ref 0.84–1.19)
MCH RBC QN AUTO: 22.7 PG (ref 26.8–34.3)
MCHC RBC AUTO-ENTMCNC: 31.2 G/DL (ref 31.4–37.4)
MCV RBC AUTO: 73 FL (ref 82–98)
PLATELET # BLD AUTO: 515 THOUSANDS/UL (ref 149–390)
PMV BLD AUTO: 9.7 FL (ref 8.9–12.7)
POTASSIUM SERPL-SCNC: 4 MMOL/L (ref 3.5–5.3)
PROT SERPL-MCNC: 6.3 G/DL (ref 6.4–8.2)
PROTHROMBIN TIME: 14.6 SECONDS (ref 11.6–14.5)
RBC # BLD AUTO: 4.1 MILLION/UL (ref 3.88–5.62)
SODIUM SERPL-SCNC: 137 MMOL/L (ref 136–145)
WBC # BLD AUTO: 11.55 THOUSAND/UL (ref 4.31–10.16)

## 2022-02-23 PROCEDURE — 85730 THROMBOPLASTIN TIME PARTIAL: CPT | Performed by: STUDENT IN AN ORGANIZED HEALTH CARE EDUCATION/TRAINING PROGRAM

## 2022-02-23 PROCEDURE — 82948 REAGENT STRIP/BLOOD GLUCOSE: CPT

## 2022-02-23 PROCEDURE — 85027 COMPLETE CBC AUTOMATED: CPT | Performed by: INTERNAL MEDICINE

## 2022-02-23 PROCEDURE — 80053 COMPREHEN METABOLIC PANEL: CPT | Performed by: INTERNAL MEDICINE

## 2022-02-23 PROCEDURE — 85610 PROTHROMBIN TIME: CPT | Performed by: STUDENT IN AN ORGANIZED HEALTH CARE EDUCATION/TRAINING PROGRAM

## 2022-02-23 PROCEDURE — 99232 SBSQ HOSP IP/OBS MODERATE 35: CPT | Performed by: INTERNAL MEDICINE

## 2022-02-23 RX ADMIN — INSULIN LISPRO 3 UNITS: 100 INJECTION, SOLUTION INTRAVENOUS; SUBCUTANEOUS at 17:10

## 2022-02-23 RX ADMIN — PANTOPRAZOLE SODIUM 40 MG: 40 TABLET, DELAYED RELEASE ORAL at 09:05

## 2022-02-23 RX ADMIN — INSULIN LISPRO 2 UNITS: 100 INJECTION, SOLUTION INTRAVENOUS; SUBCUTANEOUS at 09:08

## 2022-02-23 RX ADMIN — INSULIN LISPRO 1 UNITS: 100 INJECTION, SOLUTION INTRAVENOUS; SUBCUTANEOUS at 21:43

## 2022-02-23 RX ADMIN — AMLODIPINE BESYLATE 5 MG: 5 TABLET ORAL at 09:05

## 2022-02-23 RX ADMIN — OXYCODONE HYDROCHLORIDE 5 MG: 5 TABLET ORAL at 21:30

## 2022-02-23 RX ADMIN — HEPARIN SODIUM 5000 UNITS: 5000 INJECTION INTRAVENOUS; SUBCUTANEOUS at 13:06

## 2022-02-23 RX ADMIN — INSULIN LISPRO 3 UNITS: 100 INJECTION, SOLUTION INTRAVENOUS; SUBCUTANEOUS at 13:01

## 2022-02-23 RX ADMIN — HEPARIN SODIUM 5000 UNITS: 5000 INJECTION INTRAVENOUS; SUBCUTANEOUS at 21:30

## 2022-02-23 RX ADMIN — HEPARIN SODIUM 5000 UNITS: 5000 INJECTION INTRAVENOUS; SUBCUTANEOUS at 05:15

## 2022-02-23 RX ADMIN — OXYCODONE HYDROCHLORIDE 5 MG: 5 TABLET ORAL at 05:14

## 2022-02-23 RX ADMIN — ATORVASTATIN CALCIUM 40 MG: 40 TABLET, FILM COATED ORAL at 09:05

## 2022-02-23 RX ADMIN — INSULIN GLARGINE 15 UNITS: 100 INJECTION, SOLUTION SUBCUTANEOUS at 21:30

## 2022-02-23 RX ADMIN — PANTOPRAZOLE SODIUM 40 MG: 40 TABLET, DELAYED RELEASE ORAL at 17:10

## 2022-02-23 RX ADMIN — ASPIRIN 81 MG: 81 TABLET, COATED ORAL at 09:05

## 2022-02-23 RX ADMIN — AMLODIPINE BESYLATE 5 MG: 5 TABLET ORAL at 17:10

## 2022-02-23 NOTE — ASSESSMENT & PLAN NOTE
Lab Results   Component Value Date    EGFR 59 02/23/2022    EGFR 63 02/22/2022    EGFR 53 02/21/2022    CREATININE 1 32 (H) 02/23/2022    CREATININE 1 25 02/22/2022    CREATININE 1 43 (H) 02/21/2022   · ALEXY on CKD  · Likely due to pre-renal  · Hold lisinopril  · IVF hydration to continue

## 2022-02-23 NOTE — ASSESSMENT & PLAN NOTE
· Cont amlodipine  · Holding lisinopril due to elevated cr  · BP stable  Blood pressure 145/91, pulse 85, temperature 98 7 °F (37 1 °C), resp  rate 18, height 5' 10" (1 778 m), weight 133 kg (293 lb 3 4 oz), SpO2 96 %

## 2022-02-23 NOTE — PLAN OF CARE
Problem: Potential for Falls  Goal: Patient will remain free of falls  Description: INTERVENTIONS:  - Educate patient/family on patient safety including physical limitations  - Instruct patient to call for assistance with activity   - Consult OT/PT to assist with strengthening/mobility   - Keep Call bell within reach  - Keep bed low and locked with side rails adjusted as appropriate  - Keep care items and personal belongings within reach  - Initiate and maintain comfort rounds  - Make Fall Risk Sign visible to staff  - Offer Toileting every 2 Hours, in advance of need  - Initiate/Maintain 24alarm  - Obtain necessary fall risk management equipment:  - Apply yellow socks and bracelet for high fall risk patients  - Consider moving patient to room near nurses station  Outcome: Progressing     Problem: PAIN - ADULT  Goal: Verbalizes/displays adequate comfort level or baseline comfort level  Description: Interventions:  - Encourage patient to monitor pain and request assistance  - Assess pain using appropriate pain scale  - Administer analgesics based on type and severity of pain and evaluate response  - Implement non-pharmacological measures as appropriate and evaluate response  - Consider cultural and social influences on pain and pain management  - Notify physician/advanced practitioner if interventions unsuccessful or patient reports new pain  Outcome: Progressing     Problem: INFECTION - ADULT  Goal: Absence or prevention of progression during hospitalization  Description: INTERVENTIONS:  - Assess and monitor for signs and symptoms of infection  - Monitor lab/diagnostic results  - Monitor all insertion sites, i e  indwelling lines, tubes, and drains  - Monitor endotracheal if appropriate and nasal secretions for changes in amount and color  - [place] appropriate cooling/warming therapies per order  - Administer medications as ordered  - Instruct and encourage patient and family to use good hand hygiene technique  - Identify and instruct in appropriate isolation precautions for identified infection/condition  Outcome: Progressing  Goal: Absence of fever/infection during neutropenic period  Description: INTERVENTIONS:  - Monitor WBC    Outcome: Progressing     Problem: SAFETY ADULT  Goal: Patient will remain free of falls  Description: INTERVENTIONS:  - Educate patient/family on patient safety including physical limitations  - Instruct patient to call for assistance with activity   - Consult OT/PT to assist with strengthening/mobility   - Keep Call bell within reach  - Keep bed low and locked with side rails adjusted as appropriate  - Keep care items and personal belongings within reach  - Initiate and maintain comfort rounds  - Make Fall Risk Sign visible to staff  - Offer Toileting every 2 Hours, in advance of need  - Initiate/Maintain 24alarm  - Obtain necessary fall risk management equipment:  - Apply yellow socks and bracelet for high fall risk patients  - Consider moving patient to room near nurses station  Outcome: Progressing  Goal: Maintain or return to baseline ADL function  Description: INTERVENTIONS:  -  Assess patient's ability to carry out ADLs; assess patient's baseline for ADL function and identify physical deficits which impact ability to perform ADLs (bathing, care of mouth/teeth, toileting, grooming, dressing, etc )  - Assess/evaluate cause of self-care deficits   - Assess range of motion  - Assess patient's mobility; develop plan if impaired  - Assess patient's need for assistive devices and provide as appropriate  - Encourage maximum independence but intervene and supervise when necessary  - Involve family in performance of ADLs  - Assess for home care needs following discharge   - Consider OT consult to assist with ADL evaluation and planning for discharge  - Provide patient education as appropriate  Outcome: Progressing  Goal: Maintains/Returns to pre admission functional level  Description: INTERVENTIONS:  - Perform BMAT or MOVE assessment daily    - Set and communicate daily mobility goal to care team and patient/family/caregiver  - Collaborate with rehabilitation services on mobility goals if consulted  - Perform Range of Motion  times a day  - Reposition patient every  hours  - Dangle patient  times a day  - Stand patient  times a day  - Ambulate patient  times a day  - Out of bed to chair  times a day   - Out of bed for mildred times a day  - Out of bed for toileting  - Record patient progress and toleration of activity level   Outcome: Progressing     Problem: DISCHARGE PLANNING  Goal: Discharge to home or other facility with appropriate resources  Description: INTERVENTIONS:  - Identify barriers to discharge w/patient and caregiver  - Arrange for needed discharge resources and transportation as appropriate  - Identify discharge learning needs (meds, wound care, etc )  - Arrange for interpretive services to assist at discharge as needed  - Refer to Case Management Department for coordinating discharge planning if the patient needs post-hospital services based on physician/advanced practitioner order or complex needs related to functional status, cognitive ability, or social support system  Outcome: Progressing     Problem: Knowledge Deficit  Goal: Patient/family/caregiver demonstrates understanding of disease process, treatment plan, medications, and discharge instructions  Description: Complete learning assessment and assess knowledge base    Interventions:  - Provide teaching at level of understanding  - Provide teaching via preferred learning methods  Outcome: Progressing     Problem: GASTROINTESTINAL - ADULT  Goal: Minimal or absence of nausea and/or vomiting  Description: INTERVENTIONS:  - Administer IV fluids if ordered to ensure adequate hydration  - Maintain NPO status until nausea and vomiting are resolved  - Nasogastric tube if ordered  - Administer ordered antiemetic medications as needed  - Provide nonpharmacologic comfort measures as appropriate  - Advance diet as tolerated, if ordered  - Consider nutrition services referral to assist patient with adequate nutrition and appropriate food choices  Outcome: Progressing  Goal: Maintains or returns to baseline bowel function  Description: INTERVENTIONS:  - Assess bowel function  - Encourage oral fluids to ensure adequate hydration  - Administer IV fluids if ordered to ensure adequate hydration  - Administer ordered medications as needed  - Encourage mobilization and activity  - Consider nutritional services referral to assist patient with adequate nutrition and appropriate food choices  Outcome: Progressing  Goal: Maintains adequate nutritional intake  Description: INTERVENTIONS:  - Monitor percentage of each meal consumed  - Identify factors contributing to decreased intake, treat as appropriate  - Assist with meals as needed  - Monitor I&O, weight, and lab values if indicated  - Obtain nutrition services referral as needed  Outcome: Progressing  Goal: Establish and maintain optimal ostomy function  Description: INTERVENTIONS:  - Assess bowel function  - Encourage oral fluids to ensure adequate hydration  - Administer IV fluids if ordered to ensure adequate hydration   - Administer ordered medications as needed  - Encourage mobilization and activity  - Nutrition services referral to assist patient with appropriate food choices  - Assess stoma site  - Consider wound care consult   Outcome: Progressing  Goal: Oral mucous membranes remain intact  Description: INTERVENTIONS  - Assess oral mucosa and hygiene practices  - Implement preventative oral hygiene regimen  - Implement oral medicated treatments as ordered  - Initiate Nutrition services referral as needed  Outcome: Progressing     Problem: Nutrition/Hydration-ADULT  Goal: Nutrient/Hydration intake appropriate for improving, restoring or maintaining nutritional needs  Description: Monitor and assess patient's nutrition/hydration status for malnutrition  Collaborate with interdisciplinary team and initiate plan and interventions as ordered  Monitor patient's weight and dietary intake as ordered or per policy  Utilize nutrition screening tool and intervene as necessary  Determine patient's food preferences and provide high-protein, high-caloric foods as appropriate       INTERVENTIONS:  - Monitor oral intake, urinary output, labs, and treatment plans  - Assess nutrition and hydration status and recommend course of action  - Evaluate amount of meals eaten  - Assist patient with eating if necessary   - Allow adequate time for meals  - Recommend/ encourage appropriate diets, oral nutritional supplements, and vitamin/mineral supplements  - Order, calculate, and assess calorie counts as needed  - Recommend, monitor, and adjust tube feedings and TPN based on assessed needs  - Assess need for intravenous fluids  - Provide  nutrition/hydration education as appropriate  - Include patient/family/caregiver in decisions related to nutrition  Outcome: Progressing

## 2022-02-23 NOTE — TELEPHONE ENCOUNTER
Ordered,  but not sure if the pharmacy supplies it or he has to go to a separate supplier    We usually get a form to sign

## 2022-02-23 NOTE — ASSESSMENT & PLAN NOTE
59-year-old gentleman with history of diabetes mellitus and hypertension who presents to the hospital worsening abdominal pain and bowel irregularities  In the ED imaging concerning for new colon mass with hepatic metastasis  · Apple core lesion noted on imaging  · GI, Gen surgery on board  · Given obstructive quality, status post diverting colostomy and hepatic lesion biopsy postop day 1  · Oncology on board  · S/p CT chest, no definite metastasis in the chest  + pulmonary nodules, repeat ct in 3 months  · Bx pending   Per oncology to arrange for systemic chemo as outpt  · Diet to be advanced per Gen surg  · S/p wound care/ostomy consult for ostomy care and teaching

## 2022-02-23 NOTE — TELEPHONE ENCOUNTER
Pt had a colostomy done at [facility]; still in the hospital    Told to call Dr Marlena Kelley to have her put in an order for ostomy supplies    The pt is also checking to see which supplier participates with his insurance

## 2022-02-23 NOTE — ASSESSMENT & PLAN NOTE
Lab Results   Component Value Date    HGBA1C 7 5 (H) 02/10/2022       Recent Labs     02/22/22  1604 02/22/22  2056 02/23/22  0720 02/23/22  1109   POCGLU 187* 162* 221* 246*   · During hospitalization hold oral agents  · Resumed back on lantus at lower dose until diet is fully advanced  · Hypoglycemic protocol  · Cont ISS for now

## 2022-02-23 NOTE — DISCHARGE INSTR - OTHER ORDERS
Ostomy Care:  1  Peel back pouch using push-pull method, may use non-alcohol adhesive remover(Purple and white package)  2  Use warm water only to cleanse skin around the stoma (james-stomal skin)  3  Make sure all adhesive residue is removed and skin is dry and not oily or sticky  4  Measure stoma size using measuring guide and trace correct measurements onto back of pouch  5  Then cut backing of pouch out to correct shape/size  6  Use 3M no sting barrier film or another brand of barrier film to prep the skin around the stoma  7  Place pouch over stoma and onto skin  8  Use warmth of hand to apply gentle pressure to help backing of pouch to adhere well to skin for about 2-3 minutes  9  Measure stoma size for the first 6-8 weeks after surgery  10  Empty pouch when 1/3 full  11  Change pouch every 3-5 days or if signs of leaking

## 2022-02-23 NOTE — PROGRESS NOTES
[address] [facility]  Progress Note - Tammy Can 1965, 61 y o  male MRN: [id number]  Unit/Bed#: -01 Encounter: [id number]  Primary Care Provider: Pete Giordano MD   Date and time admitted to hospital: 2/18/2022  3:09 PM    Thrombocytosis  Assessment & Plan  Likely reactive  Cont to monitor closely  Stage 3a chronic kidney disease [facility])  Assessment & Plan  Lab Results   Component Value Date    EGFR 59 02/23/2022    EGFR 63 02/22/2022    EGFR 53 02/21/2022    CREATININE 1 32 (H) 02/23/2022    CREATININE 1 25 02/22/2022    CREATININE 1 43 (H) 02/21/2022   · ALEXY on CKD  · Likely due to pre-renal  · Hold lisinopril  · IVF hydration to continue  Transaminitis  Assessment & Plan  · Secondary to hepatic metastasis  · Will need outpatient oncology follow-up    Microcytic anemia  Assessment & Plan    Results from last 7 days   Lab Units 02/23/22  0522 02/22/22  0453 02/21/22  0450 02/20/22  0606 02/19/22  0500 02/18/22  1522   HEMOGLOBIN g/dL 9 3* 8 6* 10 0* 10 6* 9 3* 10 9*   · ABLA likely bleeding from colonic mass  · No overt sign of acute blood loss  · Cont to monitor, transfuse if < 7  · H/H remains stable    Type 2 diabetes mellitus with hyperlipidemia [facility])  Assessment & Plan  Lab Results   Component Value Date    HGBA1C 7 5 (H) 02/10/2022       Recent Labs     02/22/22  1604 02/22/22  2056 02/23/22  0720 02/23/22  1109   POCGLU 187* 162* 221* 246*   · During hospitalization hold oral agents  · Resumed back on lantus at lower dose until diet is fully advanced  · Hypoglycemic protocol  · Cont ISS for now  Morbid obesity ([address] )  Assessment & Plan  BMI of 42 07  Lifestyle/dietary modification to continue    Benign essential hypertension  Assessment & Plan  · Cont amlodipine  · Holding lisinopril due to elevated cr  · BP stable  Blood pressure 145/91, pulse 85, temperature 98 7 °F (37 1 °C), resp   rate 18, height 5' 10" (1 778 m), weight 133 kg (293 lb 3 4 oz), SpO2 96 %         * Colonic mass  Assessment & Plan  58-year-old gentleman with history of diabetes mellitus and hypertension who presents to the hospital worsening abdominal pain and bowel irregularities  In the ED imaging concerning for new colon mass with hepatic metastasis  · Apple core lesion noted on imaging  · GI, Gen surgery on board  · Given obstructive quality, status post diverting colostomy and hepatic lesion biopsy postop day 1  · Oncology on board  · S/p CT chest, no definite metastasis in the chest  + pulmonary nodules, repeat ct in 3 months  · Bx pending  Per oncology to arrange for systemic chemo as outpt  · Diet to be advanced per Gen surg  · S/p wound care/ostomy consult for ostomy care and teaching        TE Pharmacologic Prophylaxis: Heparin    Patient Centered Rounds: I have performed bedside rounds with nursing staff today  Discussions with Specialists or Other Care Team Provider:  Oncology  Education and Discussions with Family / Patient:  Patient    Current Length of Stay: 5 day(s)  Current Patient Status: Inpatient     Certification Statement: The patient will continue to require additional inpatient hospital stay due to Colonic mass  Discharge Plan / Estimated Discharge Date:  Tomorrow    Code Status: Level 1 - Full Code  ______________________________________________________________________________    Subjective:   Patient seen and examined by me  Patient feeling weak and nervous  The weakness is generalized  No chest pain, palpitations or diaphoresis  States that he has some pain around the colostomy site but otherwise feels okay    Objective:   Vitals: Blood pressure 145/91, pulse 85, temperature 98 7 °F (37 1 °C), resp  rate 18, height 5' 10" (1 778 m), weight 133 kg (293 lb 3 4 oz), SpO2 96 %      Physical Exam:   General appearance: alert, appears stated age and cooperative  Constitutional- looks a little weak  HEENT - atraumatic and normocephalic  Neck- supple  Skin - no fresh rash  Extremities no fresh focal deformities  Cardiovascular- S1-S2 heard  Respiratory- bilateral air entry present, no crackles or rhonchi  Skin - no fresh rash  Abdomen - normal bowel sounds present, no rebound tenderness, colostomy present  CNS- No fresh focal deficits  Psych- no acute psychosis     Additional Data:   Labs:  Results from last 7 days   Lab Units 02/23/22  0522 02/22/22  0453 02/21/22  0450 02/20/22  0606 02/19/22  0500 02/18/22  1522   WBC Thousand/uL 11 55* 12 59* 17 79* 13 22* 12 13* 15 66*   HEMOGLOBIN g/dL 9 3* 8 6* 10 0* 10 6* 9 3* 10 9*   HEMATOCRIT % 29 8* 28 4* 32 0* 33 3* 30 4* 35 8*   MCV fL 73* 75* 74* 74* 74* 74*   PLATELETS Thousands/uL 515* 522* 742* 723* 739* 957*     Results from last 7 days   Lab Units 02/23/22  0522 02/22/22  0453 02/21/22  0450 02/20/22  0606 02/19/22  0500 02/18/22  1522   SODIUM mmol/L 137 136 132* 135* 134* 136   POTASSIUM mmol/L 4 0 4 1 4 7 3 6 3 3* 3 5   CHLORIDE mmol/L 101 103 99* 100 99* 98*   CO2 mmol/L 25 26 24 24 27 26   ANION GAP mmol/L 11 7 9 11 8 12   BUN mg/dL 17 17 16 16 16 22   CREATININE mg/dL 1 32* 1 25 1 43* 1 27 1 23 1 57*   CALCIUM mg/dL 8 5 8 0* 8 8 8 8 8 7 9 1   ALBUMIN g/dL 2 3* 2 2*  --   --  2 5* 3 0*   TOTAL BILIRUBIN mg/dL 0 60 0 58  --   --  0 59 0 73   ALK PHOS U/L 264* 246*  --   --  275* 349*   ALT U/L 26 24  --   --  26 37   AST U/L 49* 49*  --   --  49* 67*   EGFR ml/min/1 73sq m 59 63 53 62 64 48   GLUCOSE RANDOM mg/dL 216* 122 154* 82 64* 115                      Results from last 7 days   Lab Units 02/23/22  1109 02/23/22  0720 02/22/22 2056 02/22/22  1604 02/22/22  1117 02/22/22  0758 02/21/22 2053 02/21/22  1547 02/21/22  1137 02/21/22  0713 02/20/22 2031 02/20/22  1620   POC GLUCOSE mg/dl 246* 221* 162* 187* 230* 125 185* 178* 171* 149* 317* 232*             * I Have Reviewed All Lab Data Listed Above      Cultures:               Imaging:  Imaging Reports Reviewed Today Include:   CT chest wo contrast    Result Date: 2/20/2022  Impression: No definite metastatic disease in the chest   Scattered tiny calcified and noncalcified bilateral nodules, the largest measuring 6 mm in the right upper lobe, likely with a small amount of rim calcification  Based on current Fleischner Society 2017 Guidelines on incidental pulmonary nodule, patients  with a known malignancy are at increased risk of metastasis and should receive initial three month follow-up chest CT  The study was marked in [facility] for immediate notification  Workstation performed: [id number]     CT abdomen pelvis with contrast    Result Date: 2/18/2022  Impression: 1  Distal transverse colonic apple core lesion causing a degree of obstruction with dilated upstream colon with mild surrounding fat stranding  Small foci of gas peripheral to colonic stool may represent trapped air; however, pneumatosis cannot be entirely excluded  2   Innumerable hepatic metastases  3   Nonenlarged mesenteric lymph nodes adjacent to the colonic mass, concerning for metastatic disease, given their location  The study was marked in [facility] for immediate notification   Workstation performed: [id number]     Scheduled Meds:  Current Facility-Administered Medications   Medication Dose Route Frequency Provider Last Rate    acetaminophen  650 mg Oral Q6H PRN Jade Arnold PA-C      amLODIPine  5 mg Oral BID Karen Arnold PA-C      aspirin  81 mg Oral Daily ANJELICA Mullen-BHAVANA      atorvastatin  40 mg Oral Daily ANJELICA Mullen-BHAVANA      heparin (porcine)  5,000 Units Subcutaneous [facility] Karen Arnold PA-C      HYDROmorphone  0 5 mg Intravenous Q4H PRN Karen Arnold PA-C      insulin glargine  15 Units Subcutaneous HS Jenna Russell DO      insulin lispro  1-6 Units Subcutaneous 4x Daily (AC & HS) Karen Arnold PA-C      ondansetron  4 mg Intravenous Q4H PRN Karen Arnold PA-C      oxyCODONE  10 mg Oral Q4H PRN Karen Arnold PA-C      oxyCODONE  5 mg Oral Q4H PRN Kelton Arnold PA-C      pantoprazole  40 mg Oral BID AC NANETTE Aguayo MD [address]  [facility]    ** Please Note: This note has been constructed using a voice recognition system   **

## 2022-02-23 NOTE — ASSESSMENT & PLAN NOTE
Results from last 7 days   Lab Units 02/23/22  0522 02/22/22  0453 02/21/22  0450 02/20/22  0606 02/19/22  0500 02/18/22  1522   HEMOGLOBIN g/dL 9 3* 8 6* 10 0* 10 6* 9 3* 10 9*   · ABLA likely bleeding from colonic mass  · No overt sign of acute blood loss  · Cont to monitor, transfuse if < 7  · H/H remains stable

## 2022-02-23 NOTE — PLAN OF CARE
Problem: PAIN - ADULT  Goal: Verbalizes/displays adequate comfort level or baseline comfort level  Description: Interventions:  - Encourage patient to monitor pain and request assistance  - Assess pain using appropriate pain scale  - Administer analgesics based on type and severity of pain and evaluate response  - Implement non-pharmacological measures as appropriate and evaluate response  - Consider cultural and social influences on pain and pain management  - Notify physician/advanced practitioner if interventions unsuccessful or patient reports new pain  Outcome: Progressing     Problem: GASTROINTESTINAL - ADULT  Goal: Minimal or absence of nausea and/or vomiting  Description: INTERVENTIONS:  - Administer IV fluids if ordered to ensure adequate hydration  - Maintain NPO status until nausea and vomiting are resolved  - Nasogastric tube if ordered  - Administer ordered antiemetic medications as needed  - Provide nonpharmacologic comfort measures as appropriate  - Advance diet as tolerated, if ordered  - Consider nutrition services referral to assist patient with adequate nutrition and appropriate food choices  Outcome: Progressing     Problem: GASTROINTESTINAL - ADULT  Goal: Establish and maintain optimal ostomy function  Description: INTERVENTIONS:  - Assess bowel function  - Encourage oral fluids to ensure adequate hydration  - Administer IV fluids if ordered to ensure adequate hydration   - Administer ordered medications as needed  - Encourage mobilization and activity  - Nutrition services referral to assist patient with appropriate food choices  - Assess stoma site  - Consider wound care consult   Outcome: Progressing     Problem: Knowledge Deficit  Goal: Patient/family/caregiver demonstrates understanding of disease process, treatment plan, medications, and discharge instructions  Description: Complete learning assessment and assess knowledge base    Interventions:  - Provide teaching at level of understanding  - Provide teaching via preferred learning methods  Outcome: Progressing

## 2022-02-24 LAB
ALBUMIN SERPL BCP-MCNC: 2.4 G/DL (ref 3.5–5)
ALP SERPL-CCNC: 330 U/L (ref 46–116)
ALT SERPL W P-5'-P-CCNC: 28 U/L (ref 12–78)
ANION GAP SERPL CALCULATED.3IONS-SCNC: 8 MMOL/L (ref 4–13)
AST SERPL W P-5'-P-CCNC: 72 U/L (ref 5–45)
BILIRUB SERPL-MCNC: 0.83 MG/DL (ref 0.2–1)
BUN SERPL-MCNC: 15 MG/DL (ref 5–25)
CALCIUM ALBUM COR SERPL-MCNC: 10.2 MG/DL (ref 8.3–10.1)
CALCIUM SERPL-MCNC: 8.9 MG/DL (ref 8.3–10.1)
CHLORIDE SERPL-SCNC: 99 MMOL/L (ref 100–108)
CO2 SERPL-SCNC: 25 MMOL/L (ref 21–32)
CREAT SERPL-MCNC: 1.04 MG/DL (ref 0.6–1.3)
ERYTHROCYTE [DISTWIDTH] IN BLOOD BY AUTOMATED COUNT: 18.1 % (ref 11.6–15.1)
GFR SERPL CREATININE-BSD FRML MDRD: 79 ML/MIN/1.73SQ M
GLUCOSE SERPL-MCNC: 140 MG/DL (ref 65–140)
GLUCOSE SERPL-MCNC: 146 MG/DL (ref 65–140)
GLUCOSE SERPL-MCNC: 170 MG/DL (ref 65–140)
GLUCOSE SERPL-MCNC: 200 MG/DL (ref 65–140)
GLUCOSE SERPL-MCNC: 238 MG/DL (ref 65–140)
HCT VFR BLD AUTO: 29.7 % (ref 36.5–49.3)
HGB BLD-MCNC: 9.8 G/DL (ref 12–17)
MCH RBC QN AUTO: 23.6 PG (ref 26.8–34.3)
MCHC RBC AUTO-ENTMCNC: 33 G/DL (ref 31.4–37.4)
MCV RBC AUTO: 71 FL (ref 82–98)
PLATELET # BLD AUTO: 535 THOUSANDS/UL (ref 149–390)
PMV BLD AUTO: 10.4 FL (ref 8.9–12.7)
POTASSIUM SERPL-SCNC: 3.7 MMOL/L (ref 3.5–5.3)
PROT SERPL-MCNC: 6.8 G/DL (ref 6.4–8.2)
RBC # BLD AUTO: 4.16 MILLION/UL (ref 3.88–5.62)
SODIUM SERPL-SCNC: 132 MMOL/L (ref 136–145)
WBC # BLD AUTO: 13.31 THOUSAND/UL (ref 4.31–10.16)

## 2022-02-24 PROCEDURE — 82948 REAGENT STRIP/BLOOD GLUCOSE: CPT

## 2022-02-24 PROCEDURE — 85027 COMPLETE CBC AUTOMATED: CPT | Performed by: INTERNAL MEDICINE

## 2022-02-24 PROCEDURE — 99232 SBSQ HOSP IP/OBS MODERATE 35: CPT | Performed by: HOSPITALIST

## 2022-02-24 PROCEDURE — 80053 COMPREHEN METABOLIC PANEL: CPT | Performed by: INTERNAL MEDICINE

## 2022-02-24 RX ADMIN — INSULIN GLARGINE 15 UNITS: 100 INJECTION, SOLUTION SUBCUTANEOUS at 21:52

## 2022-02-24 RX ADMIN — INSULIN LISPRO 3 UNITS: 100 INJECTION, SOLUTION INTRAVENOUS; SUBCUTANEOUS at 12:17

## 2022-02-24 RX ADMIN — INSULIN LISPRO 2 UNITS: 100 INJECTION, SOLUTION INTRAVENOUS; SUBCUTANEOUS at 17:31

## 2022-02-24 RX ADMIN — HEPARIN SODIUM 5000 UNITS: 5000 INJECTION INTRAVENOUS; SUBCUTANEOUS at 21:52

## 2022-02-24 RX ADMIN — PANTOPRAZOLE SODIUM 40 MG: 40 TABLET, DELAYED RELEASE ORAL at 17:28

## 2022-02-24 RX ADMIN — OXYCODONE HYDROCHLORIDE 10 MG: 10 TABLET ORAL at 17:27

## 2022-02-24 RX ADMIN — INSULIN LISPRO 1 UNITS: 100 INJECTION, SOLUTION INTRAVENOUS; SUBCUTANEOUS at 21:53

## 2022-02-24 RX ADMIN — AMLODIPINE BESYLATE 5 MG: 5 TABLET ORAL at 17:27

## 2022-02-24 RX ADMIN — AMLODIPINE BESYLATE 5 MG: 5 TABLET ORAL at 08:33

## 2022-02-24 RX ADMIN — OXYCODONE HYDROCHLORIDE 5 MG: 5 TABLET ORAL at 04:55

## 2022-02-24 RX ADMIN — ATORVASTATIN CALCIUM 40 MG: 40 TABLET, FILM COATED ORAL at 08:32

## 2022-02-24 RX ADMIN — ASPIRIN 81 MG: 81 TABLET, COATED ORAL at 08:33

## 2022-02-24 RX ADMIN — HEPARIN SODIUM 5000 UNITS: 5000 INJECTION INTRAVENOUS; SUBCUTANEOUS at 17:27

## 2022-02-24 RX ADMIN — HEPARIN SODIUM 5000 UNITS: 5000 INJECTION INTRAVENOUS; SUBCUTANEOUS at 05:17

## 2022-02-24 RX ADMIN — PANTOPRAZOLE SODIUM 40 MG: 40 TABLET, DELAYED RELEASE ORAL at 08:32

## 2022-02-24 NOTE — PROGRESS NOTES
[address] [facility]  Progress Note - Savage Holloway 1965, 61 y o  male MRN: [id number]  Unit/Bed#: -01 Encounter: [id number]  Primary Care Provider: Lawyer Eduardo MD   Date and time admitted to hospital: 2/18/2022  3:09 PM    * Colonic mass  Assessment & Plan  79-year-old gentleman with history of diabetes mellitus and hypertension who presents to the hospital worsening abdominal pain and bowel irregularities  In the ED imaging concerning for new colon mass with hepatic metastasis  · Apple core lesion noted on imaging  · GI, Gen surgery on board  · Given obstructive quality, status post diverting colostomy and hepatic lesion biopsy postop day 1  · Oncology on board  · S/p CT chest, no definite metastasis in the chest  + pulmonary nodules, repeat ct in 3 months  · Bx pending  Per oncology to arrange for systemic chemo as outpt  · Diet to be advanced per Gen surg  · S/p wound care/ostomy consult for ostomy care and teaching    Thrombocytosis  Assessment & Plan  Likely reactive  Cont to monitor closely  Stage 3a chronic kidney disease [facility])  Assessment & Plan  Lab Results   Component Value Date    EGFR 79 02/24/2022    EGFR 59 02/23/2022    EGFR 63 02/22/2022    CREATININE 1 04 02/24/2022    CREATININE 1 32 (H) 02/23/2022    CREATININE 1 25 02/22/2022   · ALEXY on CKD  · Likely due to pre-renal  · Hold lisinopril  · IVF hydration to continue      Transaminitis  Assessment & Plan  · Secondary to hepatic metastasis  · Will need outpatient oncology follow-up    Microcytic anemia  Assessment & Plan    Results from last 7 days   Lab Units 02/24/22  0449 02/23/22  0522 02/22/22  0453 02/21/22  0450 02/20/22  0606 02/19/22  0500 02/18/22  1522   HEMOGLOBIN g/dL 9 8* 9 3* 8 6* 10 0* 10 6* 9 3* 10 9*   · ABLA likely bleeding from colonic mass  · No overt sign of acute blood loss  · Cont to monitor, transfuse if < 7  · H/H remains stable    Type 2 diabetes mellitus with hyperlipidemia [facility])  Assessment & Plan  Lab Results   Component Value Date    HGBA1C 7 5 (H) 02/10/2022       Recent Labs     22  1109 22  1555 22  2137 22  0746   POCGLU 246* 235* 185* 140   · During hospitalization hold oral agents  · Resumed back on lantus at lower dose until diet is fully advanced  · Hypoglycemic protocol  · Cont ISS for now  Morbid obesity ([address] )  Assessment & Plan  BMI of 42 07  Lifestyle/dietary modification to continue    Benign essential hypertension  Assessment & Plan  · Cont amlodipine  · Holding lisinopril due to elevated cr  · BP stable  Blood pressure 159/94, pulse 80, temperature 98 8 °F (37 1 °C), resp  rate 18, height 5' 10" (1 778 m), weight 133 kg (293 lb 3 4 oz), SpO2 94 %  VTE Pharmacologic Prophylaxis: VTE Score: 1 Low Risk (Score 0-2) - Encourage Ambulation  Patient Centered Rounds: I performed bedside rounds with nursing staff today  Discussions with Specialists or Other Care Team Provider: surgery      Time Spent for Care: 30 minutes  More than 50% of total time spent on counseling and coordination of care as described above  Current Length of Stay: 6 day(s)  Current Patient Status: Inpatient   Certification Statement: The patient will continue to require additional inpatient hospital stay due to monitoring  Discharge Plan: Anticipate discharge tomorrow to home with home services  Code Status: Level 1 - Full Code    Subjective:   Pt is without complaint    Objective:     Vitals:   Temp (24hrs), Av 3 °F (37 4 °C), Min:98 8 °F (37 1 °C), Max:99 8 °F (37 7 °C)    Temp:  [98 8 °F (37 1 °C)-99 8 °F (37 7 °C)] 98 8 °F (37 1 °C)  HR:  [80-94] 80  Resp:  [18-20] 18  BP: (140-162)/(87-95) 159/94  SpO2:  [93 %-98 %] 94 %  Body mass index is 42 07 kg/m²  Input and Output Summary (last 24 hours):      Intake/Output Summary (Last 24 hours) at 2022 1216  Last data filed at 2022 1301  Gross per 24 hour   Intake --   Output 200 ml Net -200 ml       Physical Exam:   General appearance: alert, appears stated age and cooperative  Constitutional- looks a little weak  HEENT - atraumatic and normocephalic  Neck- supple  Skin - no fresh rash  Extremities no fresh focal deformities  Cardiovascular- S1-S2 heard  Respiratory- bilateral air entry present, no crackles or rhonchi  Skin - no fresh rash  Abdomen - normal bowel sounds present, no rebound tenderness, colostomy present  CNS- No fresh focal deficits  Psych- no acute psychosis     Additional Data:     Labs:  Results from last 7 days   Lab Units 02/24/22  0449 02/19/22  0500 02/18/22  1522   WBC Thousand/uL 13 31*   < > 15 66*   HEMOGLOBIN g/dL 9 8*   < > 10 9*   HEMATOCRIT % 29 7*   < > 35 8*   PLATELETS Thousands/uL 535*   < > 957*   NEUTROS PCT %  --   --  69   LYMPHS PCT %  --   --  18   MONOS PCT %  --   --  11   EOS PCT %  --   --  0    < > = values in this interval not displayed  Results from last 7 days   Lab Units 02/24/22  0449   SODIUM mmol/L 132*   POTASSIUM mmol/L 3 7   CHLORIDE mmol/L 99*   CO2 mmol/L 25   BUN mg/dL 15   CREATININE mg/dL 1 04   ANION GAP mmol/L 8   CALCIUM mg/dL 8 9   ALBUMIN g/dL 2 4*   TOTAL BILIRUBIN mg/dL 0 83   ALK PHOS U/L 330*   ALT U/L 28   AST U/L 72*   GLUCOSE RANDOM mg/dL 146*     Results from last 7 days   Lab Units 02/23/22  1513   INR  1 18     Results from last 7 days   Lab Units 02/24/22  1140 02/24/22  0746 02/23/22  2137 02/23/22  1555 02/23/22  1109 02/23/22  0720 02/22/22  2056 02/22/22  1604 02/22/22  1117 02/22/22  0758 02/21/22  2053 02/21/22  1547   POC GLUCOSE mg/dl 238* 140 185* 235* 246* 221* 162* 187* 230* 125 185* 178*               Lines/Drains:  Invasive Devices  Report    Peripheral Intravenous Line            Peripheral IV 02/18/22 Right Antecubital 5 days    Peripheral IV 02/20/22 Dorsal (posterior); Left Hand 4 days          Drain            Colostomy Loop RUQ 4 days                      Imaging: No pertinent imaging reviewed  Recent Cultures (last 7 days):         Last 24 Hours Medication List:   Current Facility-Administered Medications   Medication Dose Route Frequency Provider Last Rate    acetaminophen  650 mg Oral Q6H PRN Jade Arnold, NANETTE      amLODIPine  5 mg Oral BID Drew Arnold, PA-BHAVANA      aspirin  81 mg Oral Daily Jade Arnold, PA-C      atorvastatin  40 mg Oral Daily Jade Arnold, PA-BHAVANA      heparin (porcine)  5,000 Units Subcutaneous [facility] Drew Arnold, NANETTE      HYDROmorphone  0 5 mg Intravenous Q4H PRN Drew Arnold, PA-BHAVANA      insulin glargine  15 Units Subcutaneous HS Jenna Russell DO      insulin lispro  1-6 Units Subcutaneous 4x Daily (AC & HS) Drew Arnold, NANETTE      ondansetron  4 mg Intravenous Q4H PRN Drew Arnold, NANETTE      oxyCODONE  10 mg Oral Q4H PRN Drew Arnold, PA-C      oxyCODONE  5 mg Oral Q4H PRN Drew Arnold, PA-C      pantoprazole  40 mg Oral BID AC Jade Arnold, NANETTE          Today, Patient Was Seen By: Angie Paez MD    **Please Note: This note may have been constructed using a voice recognition system  **

## 2022-02-24 NOTE — PLAN OF CARE
Problem: PAIN - ADULT  Goal: Verbalizes/displays adequate comfort level or baseline comfort level  Description: Interventions:  - Encourage patient to monitor pain and request assistance  - Assess pain using appropriate pain scale  - Administer analgesics based on type and severity of pain and evaluate response  - Implement non-pharmacological measures as appropriate and evaluate response  - Consider cultural and social influences on pain and pain management  - Notify physician/advanced practitioner if interventions unsuccessful or patient reports new pain  Outcome: Progressing     Problem: INFECTION - ADULT  Goal: Absence or prevention of progression during hospitalization  Description: INTERVENTIONS:  - Assess and monitor for signs and symptoms of infection  - Monitor lab/diagnostic results  - Monitor all insertion sites, i e  indwelling lines, tubes, and drains  - Monitor endotracheal if appropriate and nasal secretions for changes in amount and color  - [place] appropriate cooling/warming therapies per order  - Administer medications as ordered  - Instruct and encourage patient and family to use good hand hygiene technique  - Identify and instruct in appropriate isolation precautions for identified infection/condition  Outcome: Progressing  Goal: Absence of fever/infection during neutropenic period  Description: INTERVENTIONS:  - Monitor WBC    Outcome: Progressing       Problem: DISCHARGE PLANNING  Goal: Discharge to home or other facility with appropriate resources  Description: INTERVENTIONS:  - Identify barriers to discharge w/patient and caregiver  - Arrange for needed discharge resources and transportation as appropriate  - Identify discharge learning needs (meds, wound care, etc )  - Arrange for interpretive services to assist at discharge as needed  - Refer to Case Management Department for coordinating discharge planning if the patient needs post-hospital services based on physician/advanced practitioner order or complex needs related to functional status, cognitive ability, or social support system  Outcome: Progressing     Problem: Knowledge Deficit  Goal: Patient/family/caregiver demonstrates understanding of disease process, treatment plan, medications, and discharge instructions  Description: Complete learning assessment and assess knowledge base    Interventions:  - Provide teaching at level of understanding  - Provide teaching via preferred learning methods  Outcome: Progressing     Problem: GASTROINTESTINAL - ADULT  Goal: Minimal or absence of nausea and/or vomiting  Description: INTERVENTIONS:  - Administer IV fluids if ordered to ensure adequate hydration  - Maintain NPO status until nausea and vomiting are resolved  - Nasogastric tube if ordered  - Administer ordered antiemetic medications as needed  - Provide nonpharmacologic comfort measures as appropriate  - Advance diet as tolerated, if ordered  - Consider nutrition services referral to assist patient with adequate nutrition and appropriate food choices  Outcome: Progressing  Goal: Maintains or returns to baseline bowel function  Description: INTERVENTIONS:  - Assess bowel function  - Encourage oral fluids to ensure adequate hydration  - Administer IV fluids if ordered to ensure adequate hydration  - Administer ordered medications as needed  - Encourage mobilization and activity  - Consider nutritional services referral to assist patient with adequate nutrition and appropriate food choices  Outcome: Progressing  Goal: Maintains adequate nutritional intake  Description: INTERVENTIONS:  - Monitor percentage of each meal consumed  - Identify factors contributing to decreased intake, treat as appropriate  - Assist with meals as needed  - Monitor I&O, weight, and lab values if indicated  - Obtain nutrition services referral as needed  Outcome: Progressing  Goal: Establish and maintain optimal ostomy function  Description: INTERVENTIONS:  - Assess bowel function  - Encourage oral fluids to ensure adequate hydration  - Administer IV fluids if ordered to ensure adequate hydration   - Administer ordered medications as needed  - Encourage mobilization and activity  - Nutrition services referral to assist patient with appropriate food choices  - Assess stoma site  - Consider wound care consult   Outcome: Progressing  Goal: Oral mucous membranes remain intact  Description: INTERVENTIONS  - Assess oral mucosa and hygiene practices  - Implement preventative oral hygiene regimen  - Implement oral medicated treatments as ordered  - Initiate Nutrition services referral as needed  Outcome: Progressing     Problem: Nutrition/Hydration-ADULT  Goal: Nutrient/Hydration intake appropriate for improving, restoring or maintaining nutritional needs  Description: Monitor and assess patient's nutrition/hydration status for malnutrition  Collaborate with interdisciplinary team and initiate plan and interventions as ordered  Monitor patient's weight and dietary intake as ordered or per policy  Utilize nutrition screening tool and intervene as necessary  Determine patient's food preferences and provide high-protein, high-caloric foods as appropriate       INTERVENTIONS:  - Monitor oral intake, urinary output, labs, and treatment plans  - Assess nutrition and hydration status and recommend course of action  - Evaluate amount of meals eaten  - Assist patient with eating if necessary   - Allow adequate time for meals  - Recommend/ encourage appropriate diets, oral nutritional supplements, and vitamin/mineral supplements  - Order, calculate, and assess calorie counts as needed  - Recommend, monitor, and adjust tube feedings and TPN based on assessed needs  - Assess need for intravenous fluids  - Provide  nutrition/hydration education as appropriate  - Include patient/family/caregiver in decisions related to nutrition  Outcome: Progressing

## 2022-02-24 NOTE — ASSESSMENT & PLAN NOTE
62-year-old gentleman with history of diabetes mellitus and hypertension who presents to the hospital worsening abdominal pain and bowel irregularities  In the ED imaging concerning for new colon mass with hepatic metastasis  · Apple core lesion noted on imaging  · GI, Gen surgery on board  · Given obstructive quality, status post diverting colostomy and hepatic lesion biopsy postop  · Oncology on board  · S/p CT chest, no definite metastasis in the chest  + pulmonary nodules, repeat ct in 3 months  · Bx pending   Per oncology to arrange for systemic chemo as outpt  · Diet to be advanced per Gen surg  · S/p wound care/ostomy consult for ostomy care and teaching  · Discharge tomorrow  ·

## 2022-02-24 NOTE — PLAN OF CARE
Problem: Potential for Falls  Goal: Patient will remain free of falls  Description: INTERVENTIONS:  - Educate patient/family on patient safety including physical limitations  - Instruct patient to call for assistance with activity   - Consult OT/PT to assist with strengthening/mobility   - Keep Call bell within reach  - Keep bed low and locked with side rails adjusted as appropriate  - Keep care items and personal belongings within reach  - Initiate and maintain comfort rounds  - Make Fall Risk Sign visible to staff  - Offer Toileting every 2 Hours, in advance of need  - Initiate/Maintain 24alarm  - Obtain necessary fall risk management equipment:  - Apply yellow socks and bracelet for high fall risk patients  - Consider moving patient to room near nurses station  Outcome: Progressing     Problem: PAIN - ADULT  Goal: Verbalizes/displays adequate comfort level or baseline comfort level  Description: Interventions:  - Encourage patient to monitor pain and request assistance  - Assess pain using appropriate pain scale  - Administer analgesics based on type and severity of pain and evaluate response  - Implement non-pharmacological measures as appropriate and evaluate response  - Consider cultural and social influences on pain and pain management  - Notify physician/advanced practitioner if interventions unsuccessful or patient reports new pain  Outcome: Progressing     Problem: INFECTION - ADULT  Goal: Absence or prevention of progression during hospitalization  Description: INTERVENTIONS:  - Assess and monitor for signs and symptoms of infection  - Monitor lab/diagnostic results  - Monitor all insertion sites, i e  indwelling lines, tubes, and drains  - Monitor endotracheal if appropriate and nasal secretions for changes in amount and color  - [place] appropriate cooling/warming therapies per order  - Administer medications as ordered  - Instruct and encourage patient and family to use good hand hygiene technique  - Identify and instruct in appropriate isolation precautions for identified infection/condition  Outcome: Progressing  Goal: Absence of fever/infection during neutropenic period  Description: INTERVENTIONS:  - Monitor WBC    Outcome: Progressing     Problem: SAFETY ADULT  Goal: Patient will remain free of falls  Description: INTERVENTIONS:  - Educate patient/family on patient safety including physical limitations  - Instruct patient to call for assistance with activity   - Consult OT/PT to assist with strengthening/mobility   - Keep Call bell within reach  - Keep bed low and locked with side rails adjusted as appropriate  - Keep care items and personal belongings within reach  - Initiate and maintain comfort rounds  - Make Fall Risk Sign visible to staff  - Offer Toileting every 2 Hours, in advance of need  - Initiate/Maintain 24alarm  - Obtain necessary fall risk management equipment:  - Apply yellow socks and bracelet for high fall risk patients  - Consider moving patient to room near nurses station  Outcome: Progressing  Goal: Maintain or return to baseline ADL function  Description: INTERVENTIONS:  -  Assess patient's ability to carry out ADLs; assess patient's baseline for ADL function and identify physical deficits which impact ability to perform ADLs (bathing, care of mouth/teeth, toileting, grooming, dressing, etc )  - Assess/evaluate cause of self-care deficits   - Assess range of motion  - Assess patient's mobility; develop plan if impaired  - Assess patient's need for assistive devices and provide as appropriate  - Encourage maximum independence but intervene and supervise when necessary  - Involve family in performance of ADLs  - Assess for home care needs following discharge   - Consider OT consult to assist with ADL evaluation and planning for discharge  - Provide patient education as appropriate  Outcome: Progressing  Goal: Maintains/Returns to pre admission functional level  Description: INTERVENTIONS:  - Perform BMAT or MOVE assessment daily    - Set and communicate daily mobility goal to care team and patient/family/caregiver  - Collaborate with rehabilitation services on mobility goals if consulted  - Perform Range of Motion  times a day  - Reposition patient every  hours  - Dangle patient  times a day  - Stand patient  times a day  - Ambulate patient  times a day  - Out of bed to chair  times a day   - Out of bed for meal times a day  - Out of bed for toileting  - Record patient progress and toleration of activity level   Outcome: Progressing     Problem: DISCHARGE PLANNING  Goal: Discharge to home or other facility with appropriate resources  Description: INTERVENTIONS:  - Identify barriers to discharge w/patient and caregiver  - Arrange for needed discharge resources and transportation as appropriate  - Identify discharge learning needs (meds, wound care, etc )  - Arrange for interpretive services to assist at discharge as needed  - Refer to Case Management Department for coordinating discharge planning if the patient needs post-hospital services based on physician/advanced practitioner order or complex needs related to functional status, cognitive ability, or social support system  Outcome: Progressing     Problem: Knowledge Deficit  Goal: Patient/family/caregiver demonstrates understanding of disease process, treatment plan, medications, and discharge instructions  Description: Complete learning assessment and assess knowledge base    Interventions:  - Provide teaching at level of understanding  - Provide teaching via preferred learning methods  Outcome: Progressing     Problem: GASTROINTESTINAL - ADULT  Goal: Minimal or absence of nausea and/or vomiting  Description: INTERVENTIONS:  - Administer IV fluids if ordered to ensure adequate hydration  - Maintain NPO status until nausea and vomiting are resolved  - Nasogastric tube if ordered  - Administer ordered antiemetic medications as needed  - Provide nonpharmacologic comfort measures as appropriate  - Advance diet as tolerated, if ordered  - Consider nutrition services referral to assist patient with adequate nutrition and appropriate food choices  Outcome: Progressing  Goal: Maintains or returns to baseline bowel function  Description: INTERVENTIONS:  - Assess bowel function  - Encourage oral fluids to ensure adequate hydration  - Administer IV fluids if ordered to ensure adequate hydration  - Administer ordered medications as needed  - Encourage mobilization and activity  - Consider nutritional services referral to assist patient with adequate nutrition and appropriate food choices  Outcome: Progressing  Goal: Maintains adequate nutritional intake  Description: INTERVENTIONS:  - Monitor percentage of each meal consumed  - Identify factors contributing to decreased intake, treat as appropriate  - Assist with meals as needed  - Monitor I&O, weight, and lab values if indicated  - Obtain nutrition services referral as needed  Outcome: Progressing  Goal: Establish and maintain optimal ostomy function  Description: INTERVENTIONS:  - Assess bowel function  - Encourage oral fluids to ensure adequate hydration  - Administer IV fluids if ordered to ensure adequate hydration   - Administer ordered medications as needed  - Encourage mobilization and activity  - Nutrition services referral to assist patient with appropriate food choices  - Assess stoma site  - Consider wound care consult   Outcome: Progressing  Goal: Oral mucous membranes remain intact  Description: INTERVENTIONS  - Assess oral mucosa and hygiene practices  - Implement preventative oral hygiene regimen  - Implement oral medicated treatments as ordered  - Initiate Nutrition services referral as needed  Outcome: Progressing     Problem: GASTROINTESTINAL - ADULT  Goal: Minimal or absence of nausea and/or vomiting  Description: INTERVENTIONS:  - Administer IV fluids if ordered to ensure adequate hydration  - Maintain NPO status until nausea and vomiting are resolved  - Nasogastric tube if ordered  - Administer ordered antiemetic medications as needed  - Provide nonpharmacologic comfort measures as appropriate  - Advance diet as tolerated, if ordered  - Consider nutrition services referral to assist patient with adequate nutrition and appropriate food choices  Outcome: Progressing  Goal: Maintains or returns to baseline bowel function  Description: INTERVENTIONS:  - Assess bowel function  - Encourage oral fluids to ensure adequate hydration  - Administer IV fluids if ordered to ensure adequate hydration  - Administer ordered medications as needed  - Encourage mobilization and activity  - Consider nutritional services referral to assist patient with adequate nutrition and appropriate food choices  Outcome: Progressing  Goal: Maintains adequate nutritional intake  Description: INTERVENTIONS:  - Monitor percentage of each meal consumed  - Identify factors contributing to decreased intake, treat as appropriate  - Assist with meals as needed  - Monitor I&O, weight, and lab values if indicated  - Obtain nutrition services referral as needed  Outcome: Progressing  Goal: Establish and maintain optimal ostomy function  Description: INTERVENTIONS:  - Assess bowel function  - Encourage oral fluids to ensure adequate hydration  - Administer IV fluids if ordered to ensure adequate hydration   - Administer ordered medications as needed  - Encourage mobilization and activity  - Nutrition services referral to assist patient with appropriate food choices  - Assess stoma site  - Consider wound care consult   Outcome: Progressing  Goal: Oral mucous membranes remain intact  Description: INTERVENTIONS  - Assess oral mucosa and hygiene practices  - Implement preventative oral hygiene regimen  - Implement oral medicated treatments as ordered  - Initiate Nutrition services referral as needed  Outcome: Progressing Problem: Nutrition/Hydration-ADULT  Goal: Nutrient/Hydration intake appropriate for improving, restoring or maintaining nutritional needs  Description: Monitor and assess patient's nutrition/hydration status for malnutrition  Collaborate with interdisciplinary team and initiate plan and interventions as ordered  Monitor patient's weight and dietary intake as ordered or per policy  Utilize nutrition screening tool and intervene as necessary  Determine patient's food preferences and provide high-protein, high-caloric foods as appropriate       INTERVENTIONS:  - Monitor oral intake, urinary output, labs, and treatment plans  - Assess nutrition and hydration status and recommend course of action  - Evaluate amount of meals eaten  - Assist patient with eating if necessary   - Allow adequate time for meals  - Recommend/ encourage appropriate diets, oral nutritional supplements, and vitamin/mineral supplements  - Order, calculate, and assess calorie counts as needed  - Recommend, monitor, and adjust tube feedings and TPN based on assessed needs  - Assess need for intravenous fluids  - Provide  nutrition/hydration education as appropriate  - Include patient/family/caregiver in decisions related to nutrition  Outcome: Progressing

## 2022-02-24 NOTE — ASSESSMENT & PLAN NOTE
Lab Results   Component Value Date    HGBA1C 7 5 (H) 02/10/2022       Recent Labs     02/23/22  1109 02/23/22  1555 02/23/22  2137 02/24/22  0746   POCGLU 246* 235* 185* 140   · During hospitalization hold oral agents  · Resumed back on lantus at lower dose until diet is fully advanced  · Hypoglycemic protocol  · Cont ISS for now

## 2022-02-24 NOTE — ASSESSMENT & PLAN NOTE
Lab Results   Component Value Date    EGFR 79 02/24/2022    EGFR 59 02/23/2022    EGFR 63 02/22/2022    CREATININE 1 04 02/24/2022    CREATININE 1 32 (H) 02/23/2022    CREATININE 1 25 02/22/2022   · ALEXY on CKD  · Likely due to pre-renal  · Hold lisinopril  · IVF hydration to continue

## 2022-02-24 NOTE — ASSESSMENT & PLAN NOTE
· Cont amlodipine  · Holding lisinopril due to elevated cr  · BP stable  Blood pressure 159/94, pulse 80, temperature 98 8 °F (37 1 °C), resp  rate 18, height 5' 10" (1 778 m), weight 133 kg (293 lb 3 4 oz), SpO2 94 %

## 2022-02-24 NOTE — ASSESSMENT & PLAN NOTE
Results from last 7 days   Lab Units 02/24/22  0449 02/23/22  0522 02/22/22  0453 02/21/22  0450 02/20/22  0606 02/19/22  0500 02/18/22  1522   HEMOGLOBIN g/dL 9 8* 9 3* 8 6* 10 0* 10 6* 9 3* 10 9*   · ABLA likely bleeding from colonic mass  · No overt sign of acute blood loss  · Cont to monitor, transfuse if < 7  · H/H remains stable

## 2022-02-24 NOTE — WOUND OSTOMY CARE
Progress Note- Ostomy  Severo Herr 61 y o  male  [id number]  --01    Assessment:  Patient is POD # 4 of diverting loop colostomy placement due to obstructing colonic mass  Seen for ostomy education and teaching  Introduced self and role to patient  Patient is agreeable to visit  Patient is primary learner and eager to learn, wife is at bedside for teaching and pouch change education  Patient is alert and awake, states he has been participating in emptying  the pouch of stool, and burping the pouch to release gas, patient and wife asked appropriate questions, patient and wife were able to repeat information that was taught today, patient and wife remained attentive throughout 100% of education today  Patient able to provide step by step instructions for pouch change       Topics reviewed: normal stoma appearance, how and when to empty (1/3 full) to prevent pulling/lifting of the barrier, importance & how to clean the end of the pouch to prevent odor, gas/odor producing foods, frequency of changing of the pouch (every 3-4 days on a schedule), burping, one piece/two piece pouching systems differences, open and closed end pouches, gas valve functionality of one piece, Clothing- including avoiding placing belt-line/seat belts over the stoma, bathing, and how to obtain supplies      Step-by-step pouch change done using 1 piece [employer] pouch  Reviewed with patient how and when to measure the stoma (weekly)  Demonstrated how to trace & cut one piece barrier, and how to apply it to the skin using gentle pressure / warmth of the hands  Skin prep applied to nisa-stomal skin, rationale explained to patient  Good seal obtained  RUQ loop colostomy: Stoma measures 2 25 inches, os is noted toward 9 o'clock, round in shape, is beefy red in color with scattered areas of brown and yellow slough, support bar in place and sutures intact that keep the bar in place, mucocutaneous junction is intact   Nisa-stomal skin is intact with no redness or wounds  Effluent is moderate amount of soft brown stool, positive for gas output      Plan is for patient to have VNA at home      Patient verbalized understanding of education and teaching  Patient is active learner  All questions and concerns answered  Encouraged patient to continue to read the educational materials provided - "Life after colostomy Surgery" by [facility]      Patient gave verbal permission to order sample kits from [place], [facility], and [facility]       Plan:   1  Will continue to follow with patient while an inpatient  2  Encourage patient to participate in emptying and burping pouch  3  Encourage patient to ambulate  4  Education materials left at bedside  5  Empty pouch when 1/3 full  6  Change pouch if signs of leaking      Ostomy Care:  1  Peel back pouch using push-pull method, may use non-alcohol adhesive remover(Purple and white package)  2  Use warm water only to cleanse skin around the stoma (james-stomal skin)  3  Make sure all adhesive residue is removed and skin is dry and not oily  4  Measure stoma size using measuring guide and trace correct measurements onto back of pouch  5  Then cut backing of pouch out to correct shape/size  6  Use 3M no sting barrier film to prep the skin around the stoma  7  Place pouch over stoma and onto skin  8  Use warmth of hand to apply gentle pressure to help backing of pouch to adhere well to skin  Colostomy Loop RUQ (Active)   Stomal Appliance 1 piece; Changed 02/24/22 1540   Stoma Assessment Budded;Brown;Red 02/24/22 1540   Stoma size (in) 2 25 in 02/24/22 1540   Stoma Shape Round;Budded 02/24/22 1540   Peristomal Assessment Clean; Intact 02/24/22 1540   Treatment Bag change;Site care; Other (Comment) 02/24/22 1540   Output (mL) 200 mL 02/24/22 1540   Number of days: 4           Call or tigertext with any questions  Wound and Ostomy Care will continue to follow    Mei Osborne RN BSN  Wound and Ostomy care

## 2022-02-25 VITALS
WEIGHT: 293.21 LBS | HEART RATE: 89 BPM | TEMPERATURE: 99 F | DIASTOLIC BLOOD PRESSURE: 88 MMHG | SYSTOLIC BLOOD PRESSURE: 150 MMHG | HEIGHT: 70 IN | OXYGEN SATURATION: 96 % | BODY MASS INDEX: 41.98 KG/M2 | RESPIRATION RATE: 18 BRPM

## 2022-02-25 LAB
GLUCOSE SERPL-MCNC: 205 MG/DL (ref 65–140)
GLUCOSE SERPL-MCNC: 342 MG/DL (ref 65–140)

## 2022-02-25 PROCEDURE — 82948 REAGENT STRIP/BLOOD GLUCOSE: CPT

## 2022-02-25 PROCEDURE — 99239 HOSP IP/OBS DSCHRG MGMT >30: CPT | Performed by: HOSPITALIST

## 2022-02-25 RX ORDER — INSULIN GLARGINE 100 [IU]/ML
15 INJECTION, SOLUTION SUBCUTANEOUS
Qty: 10 ML | Refills: 0 | Status: SHIPPED | OUTPATIENT
Start: 2022-02-25 | End: 2022-02-25

## 2022-02-25 RX ORDER — INSULIN GLARGINE 100 [IU]/ML
10 INJECTION, SOLUTION SUBCUTANEOUS
Qty: 10 ML | Refills: 0 | Status: SHIPPED | OUTPATIENT
Start: 2022-02-25 | End: 2022-05-24

## 2022-02-25 RX ORDER — OXYCODONE HYDROCHLORIDE 5 MG/1
5 TABLET ORAL EVERY 4 HOURS PRN
Qty: 30 TABLET | Refills: 0 | Status: SHIPPED | OUTPATIENT
Start: 2022-02-25 | End: 2022-03-07

## 2022-02-25 RX ORDER — OXYCODONE HYDROCHLORIDE 10 MG/1
10 TABLET ORAL EVERY 4 HOURS PRN
Qty: 30 TABLET | Refills: 0 | Status: SHIPPED | OUTPATIENT
Start: 2022-02-25 | End: 2022-02-25

## 2022-02-25 RX ORDER — OXYCODONE HYDROCHLORIDE 10 MG/1
10 TABLET ORAL EVERY 4 HOURS PRN
Qty: 30 TABLET | Refills: 0 | Status: SHIPPED | OUTPATIENT
Start: 2022-02-25 | End: 2022-03-01 | Stop reason: RX

## 2022-02-25 RX ORDER — OXYCODONE HYDROCHLORIDE 5 MG/1
5 TABLET ORAL EVERY 4 HOURS PRN
Qty: 30 TABLET | Refills: 0 | Status: SHIPPED | OUTPATIENT
Start: 2022-02-25 | End: 2022-02-25

## 2022-02-25 RX ADMIN — ASPIRIN 81 MG: 81 TABLET, COATED ORAL at 08:55

## 2022-02-25 RX ADMIN — INSULIN LISPRO 2 UNITS: 100 INJECTION, SOLUTION INTRAVENOUS; SUBCUTANEOUS at 08:56

## 2022-02-25 RX ADMIN — AMLODIPINE BESYLATE 5 MG: 5 TABLET ORAL at 08:55

## 2022-02-25 RX ADMIN — HEPARIN SODIUM 5000 UNITS: 5000 INJECTION INTRAVENOUS; SUBCUTANEOUS at 05:45

## 2022-02-25 RX ADMIN — PANTOPRAZOLE SODIUM 40 MG: 40 TABLET, DELAYED RELEASE ORAL at 08:55

## 2022-02-25 RX ADMIN — INSULIN LISPRO 5 UNITS: 100 INJECTION, SOLUTION INTRAVENOUS; SUBCUTANEOUS at 11:27

## 2022-02-25 RX ADMIN — HEPARIN SODIUM 5000 UNITS: 5000 INJECTION INTRAVENOUS; SUBCUTANEOUS at 13:08

## 2022-02-25 RX ADMIN — ATORVASTATIN CALCIUM 40 MG: 40 TABLET, FILM COATED ORAL at 08:55

## 2022-02-25 NOTE — ASSESSMENT & PLAN NOTE
Lab Results   Component Value Date    HGBA1C 7 5 (H) 02/10/2022       Recent Labs     02/24/22  1140 02/24/22  1525 02/24/22 2055 02/25/22  0735   POCGLU 238* 200* 170* 205*   · During hospitalization hold oral agents  · Resumed back on lantus at lower dose until diet is fully advanced  · Hypoglycemic protocol  · Cont ISS for now

## 2022-02-25 NOTE — PLAN OF CARE
Problem: PAIN - ADULT  Goal: Verbalizes/displays adequate comfort level or baseline comfort level  Description: Interventions:  - Encourage patient to monitor pain and request assistance  - Assess pain using appropriate pain scale  - Administer analgesics based on type and severity of pain and evaluate response  - Implement non-pharmacological measures as appropriate and evaluate response  - Consider cultural and social influences on pain and pain management  - Notify physician/advanced practitioner if interventions unsuccessful or patient reports new pain  Outcome: Progressing     Problem: INFECTION - ADULT  Goal: Absence or prevention of progression during hospitalization  Description: INTERVENTIONS:  - Assess and monitor for signs and symptoms of infection  - Monitor lab/diagnostic results  - Monitor all insertion sites, i e  indwelling lines, tubes, and drains  - Monitor endotracheal if appropriate and nasal secretions for changes in amount and color  - [place] appropriate cooling/warming therapies per order  - Administer medications as ordered  - Instruct and encourage patient and family to use good hand hygiene technique  - Identify and instruct in appropriate isolation precautions for identified infection/condition  Outcome: Progressing  Goal: Absence of fever/infection during neutropenic period  Description: INTERVENTIONS:  - Monitor WBC    Outcome: Progressing

## 2022-02-25 NOTE — ASSESSMENT & PLAN NOTE
· Cont amlodipine  · Holding lisinopril due to elevated cr  · BP stable  Blood pressure 150/88, pulse 89, temperature 99 °F (37 2 °C), temperature source Oral, resp  rate 18, height 5' 10" (1 778 m), weight 133 kg (293 lb 3 4 oz), SpO2 96 %

## 2022-02-25 NOTE — DISCHARGE SUMMARY
[address] [facility]  Discharge- [facility] 1965, 61 y o  male MRN: [id number]  Unit/Bed#: -01 Encounter: [id number]  Primary Care Provider: Demarcus Ramos MD   Date and time admitted to hospital: 2/18/2022  3:09 PM    * Colonic mass  Assessment & Plan  58-year-old gentleman with history of diabetes mellitus and hypertension who presents to the hospital worsening abdominal pain and bowel irregularities  In the ED imaging concerning for new colon mass with hepatic metastasis  · Apple core lesion noted on imaging  · GI, Gen surgery on board  · Given obstructive quality, status post diverting colostomy and hepatic lesion biopsy postop  · Oncology on board  · S/p CT chest, no definite metastasis in the chest  + pulmonary nodules, repeat ct in 3 months  · Bx pending  Per oncology to arrange for systemic chemo as outpt  · Diet to be advanced per Gen surg  · S/p wound care/ostomy consult for ostomy care and teaching  · Discharge today  ·     Thrombocytosis  Assessment & Plan  Likely reactive  Cont to monitor closely  Stage 3a chronic kidney disease [facility])  Assessment & Plan  Lab Results   Component Value Date    EGFR 79 02/24/2022    EGFR 59 02/23/2022    EGFR 63 02/22/2022    CREATININE 1 04 02/24/2022    CREATININE 1 32 (H) 02/23/2022    CREATININE 1 25 02/22/2022   · ALEXY on CKD  · Likely due to pre-renal  · Hold lisinopril  · IVF hydration to continue      Transaminitis  Assessment & Plan  · Secondary to hepatic metastasis  · Will need outpatient oncology follow-up    Microcytic anemia  Assessment & Plan    Results from last 7 days   Lab Units 02/24/22  0449 02/23/22  0522 02/22/22  0453 02/21/22  0450 02/20/22  0606 02/19/22  0500 02/18/22  1522   HEMOGLOBIN g/dL 9 8* 9 3* 8 6* 10 0* 10 6* 9 3* 10 9*   · ABLA likely bleeding from colonic mass  · No overt sign of acute blood loss  · Cont to monitor, transfuse if < 7  · H/H remains stable    Type 2 diabetes mellitus with hyperlipidemia [facility])  Assessment & Plan  Lab Results   Component Value Date    HGBA1C 7 5 (H) 02/10/2022       Recent Labs     02/24/22  1140 02/24/22  1525 02/24/22 2055 02/25/22  0735   POCGLU 238* 200* 170* 205*   · During hospitalization hold oral agents  · Resumed back on lantus at lower dose until diet is fully advanced  · Hypoglycemic protocol  · Cont ISS for now  Morbid obesity ([address] )  Assessment & Plan  BMI of 42 07  Lifestyle/dietary modification to continue    Benign essential hypertension  Assessment & Plan  · Cont amlodipine  · Holding lisinopril due to elevated cr  · BP stable  Blood pressure 150/88, pulse 89, temperature 99 °F (37 2 °C), temperature source Oral, resp  rate 18, height 5' 10" (1 778 m), weight 133 kg (293 lb 3 4 oz), SpO2 96 %  Medical Problems             Resolved Problems  Date Reviewed: 2/23/2022    None              Discharging Physician / Practitioner: Radha Berumen MD  PCP: Maria L Ambriz MD  Admission Date:   Admission Orders (From admission, onward)     Ordered        02/18/22 Nirali Ordo[address]ez 1  Inpatient Admission  Once                      Discharge Date: 02/25/22    Consultations During Hospital Stay:  · Surgery, GI, Heme/onc    Procedures Performed:   · Diverting colostomy    Significant Findings / Test Results:   · Colonic mass    Incidental Findings:   · none     Test Results Pending at Discharge (will require follow up): · Biopsy results     Outpatient Tests Requested:  · none    Complications:  none    Reason for Admission: abdominal pain    Hospital Course:   Lillie Kaur is a 61 y o  male patient who originally presented to the hospital on 2/18/2022 due to abdominal pain  He was found to have a colonic mass and he required a diverting colostomy  He tolerated procedure well and will outpatient follow up  Please see above list of diagnoses and related plan for additional information       Condition at Discharge: fair    Discharge Day Visit / Exam:   * Please refer to separate progress note for these details *      Discharge instructions/Information to patient and family:   See after visit summary for information provided to patient and family  Provisions for Follow-Up Care:  See after visit summary for information related to follow-up care and any pertinent home health orders  Disposition:   Home with VNA Services (Reminder: Complete face to face encounter)    Planned Readmission: no     Discharge Statement:  I spent 38 minutes discharging the patient  This time was spent on the day of discharge  I had direct contact with the patient on the day of discharge  Greater than 50% of the total time was spent examining patient, answering all patient questions, arranging and discussing plan of care with patient as well as directly providing post-discharge instructions  Additional time then spent on discharge activities  Discharge Medications:  See after visit summary for reconciled discharge medications provided to patient and/or family        **Please Note: This note may have been constructed using a voice recognition system**

## 2022-02-25 NOTE — ASSESSMENT & PLAN NOTE
59-year-old gentleman with history of diabetes mellitus and hypertension who presents to the hospital worsening abdominal pain and bowel irregularities  In the ED imaging concerning for new colon mass with hepatic metastasis  · Apple core lesion noted on imaging  · GI, Gen surgery on board  · Given obstructive quality, status post diverting colostomy and hepatic lesion biopsy postop  · Oncology on board  · S/p CT chest, no definite metastasis in the chest  + pulmonary nodules, repeat ct in 3 months  · Bx pending   Per oncology to arrange for systemic chemo as outpt  · Diet to be advanced per Gen surg  · S/p wound care/ostomy consult for ostomy care and teaching  · Discharge today  ·

## 2022-02-25 NOTE — PLAN OF CARE
Problem: Potential for Falls  Goal: Patient will remain free of falls  Description: INTERVENTIONS:  - Educate patient/family on patient safety including physical limitations  - Instruct patient to call for assistance with activity   - Consult OT/PT to assist with strengthening/mobility   - Keep Call bell within reach  - Keep bed low and locked with side rails adjusted as appropriate  - Keep care items and personal belongings within reach  - Initiate and maintain comfort rounds  - Make Fall Risk Sign visible to staff  - Offer Toileting every 2 Hours, in advance of need  - Initiate/Maintain 24alarm  - Obtain necessary fall risk management equipment:  - Apply yellow socks and bracelet for high fall risk patients  - Consider moving patient to room near nurses station  Outcome: Progressing     Problem: PAIN - ADULT  Goal: Verbalizes/displays adequate comfort level or baseline comfort level  Description: Interventions:  - Encourage patient to monitor pain and request assistance  - Assess pain using appropriate pain scale  - Administer analgesics based on type and severity of pain and evaluate response  - Implement non-pharmacological measures as appropriate and evaluate response  - Consider cultural and social influences on pain and pain management  - Notify physician/advanced practitioner if interventions unsuccessful or patient reports new pain  Outcome: Progressing     Problem: INFECTION - ADULT  Goal: Absence or prevention of progression during hospitalization  Description: INTERVENTIONS:  - Assess and monitor for signs and symptoms of infection  - Monitor lab/diagnostic results  - Monitor all insertion sites, i e  indwelling lines, tubes, and drains  - Monitor endotracheal if appropriate and nasal secretions for changes in amount and color  - [place] appropriate cooling/warming therapies per order  - Administer medications as ordered  - Instruct and encourage patient and family to use good hand hygiene technique  - Identify and instruct in appropriate isolation precautions for identified infection/condition  Outcome: Progressing  Goal: Absence of fever/infection during neutropenic period  Description: INTERVENTIONS:  - Monitor WBC    Outcome: Progressing     Problem: SAFETY ADULT  Goal: Patient will remain free of falls  Description: INTERVENTIONS:  - Educate patient/family on patient safety including physical limitations  - Instruct patient to call for assistance with activity   - Consult OT/PT to assist with strengthening/mobility   - Keep Call bell within reach  - Keep bed low and locked with side rails adjusted as appropriate  - Keep care items and personal belongings within reach  - Initiate and maintain comfort rounds  - Make Fall Risk Sign visible to staff  - Offer Toileting every 2 Hours, in advance of need  - Initiate/Maintain 24alarm  - Obtain necessary fall risk management equipment:  - Apply yellow socks and bracelet for high fall risk patients  - Consider moving patient to room near nurses station  Outcome: Progressing  Goal: Maintain or return to baseline ADL function  Description: INTERVENTIONS:  -  Assess patient's ability to carry out ADLs; assess patient's baseline for ADL function and identify physical deficits which impact ability to perform ADLs (bathing, care of mouth/teeth, toileting, grooming, dressing, etc )  - Assess/evaluate cause of self-care deficits   - Assess range of motion  - Assess patient's mobility; develop plan if impaired  - Assess patient's need for assistive devices and provide as appropriate  - Encourage maximum independence but intervene and supervise when necessary  - Involve family in performance of ADLs  - Assess for home care needs following discharge   - Consider OT consult to assist with ADL evaluation and planning for discharge  - Provide patient education as appropriate  Outcome: Progressing  Goal: Maintains/Returns to pre admission functional level  Description: INTERVENTIONS:  - Perform BMAT or MOVE assessment daily    - Set and communicate daily mobility goal to care team and patient/family/caregiver  - Collaborate with rehabilitation services on mobility goals if consulted  - Perform Range of Motion 3 times a day  - Reposition patient every 2 hours  - Dangle patient 3 times a day  - Stand patient 3 times a day  - Ambulate patient 3 times a day  - Out of bed to chair 3 times a day   - Out of bed for meals 3 times a day  - Out of bed for toileting  - Record patient progress and toleration of activity level   Outcome: Progressing     Problem: DISCHARGE PLANNING  Goal: Discharge to home or other facility with appropriate resources  Description: INTERVENTIONS:  - Identify barriers to discharge w/patient and caregiver  - Arrange for needed discharge resources and transportation as appropriate  - Identify discharge learning needs (meds, wound care, etc )  - Arrange for interpretive services to assist at discharge as needed  - Refer to Case Management Department for coordinating discharge planning if the patient needs post-hospital services based on physician/advanced practitioner order or complex needs related to functional status, cognitive ability, or social support system  Outcome: Progressing     Problem: Knowledge Deficit  Goal: Patient/family/caregiver demonstrates understanding of disease process, treatment plan, medications, and discharge instructions  Description: Complete learning assessment and assess knowledge base    Interventions:  - Provide teaching at level of understanding  - Provide teaching via preferred learning methods  Outcome: Progressing     Problem: GASTROINTESTINAL - ADULT  Goal: Minimal or absence of nausea and/or vomiting  Description: INTERVENTIONS:  - Administer IV fluids if ordered to ensure adequate hydration  - Maintain NPO status until nausea and vomiting are resolved  - Nasogastric tube if ordered  - Administer ordered antiemetic medications as needed  - Provide nonpharmacologic comfort measures as appropriate  - Advance diet as tolerated, if ordered  - Consider nutrition services referral to assist patient with adequate nutrition and appropriate food choices  Outcome: Progressing  Goal: Maintains or returns to baseline bowel function  Description: INTERVENTIONS:  - Assess bowel function  - Encourage oral fluids to ensure adequate hydration  - Administer IV fluids if ordered to ensure adequate hydration  - Administer ordered medications as needed  - Encourage mobilization and activity  - Consider nutritional services referral to assist patient with adequate nutrition and appropriate food choices  Outcome: Progressing  Goal: Maintains adequate nutritional intake  Description: INTERVENTIONS:  - Monitor percentage of each meal consumed  - Identify factors contributing to decreased intake, treat as appropriate  - Assist with meals as needed  - Monitor I&O, weight, and lab values if indicated  - Obtain nutrition services referral as needed  Outcome: Progressing  Goal: Establish and maintain optimal ostomy function  Description: INTERVENTIONS:  - Assess bowel function  - Encourage oral fluids to ensure adequate hydration  - Administer IV fluids if ordered to ensure adequate hydration   - Administer ordered medications as needed  - Encourage mobilization and activity  - Nutrition services referral to assist patient with appropriate food choices  - Assess stoma site  - Consider wound care consult   Outcome: Progressing  Goal: Oral mucous membranes remain intact  Description: INTERVENTIONS  - Assess oral mucosa and hygiene practices  - Implement preventative oral hygiene regimen  - Implement oral medicated treatments as ordered  - Initiate Nutrition services referral as needed  Outcome: Progressing     Problem: Nutrition/Hydration-ADULT  Goal: Nutrient/Hydration intake appropriate for improving, restoring or maintaining nutritional needs  Description: Monitor and assess patient's nutrition/hydration status for malnutrition  Collaborate with interdisciplinary team and initiate plan and interventions as ordered  Monitor patient's weight and dietary intake as ordered or per policy  Utilize nutrition screening tool and intervene as necessary  Determine patient's food preferences and provide high-protein, high-caloric foods as appropriate       INTERVENTIONS:  - Monitor oral intake, urinary output, labs, and treatment plans  - Assess nutrition and hydration status and recommend course of action  - Evaluate amount of meals eaten  - Assist patient with eating if necessary   - Allow adequate time for meals  - Recommend/ encourage appropriate diets, oral nutritional supplements, and vitamin/mineral supplements  - Order, calculate, and assess calorie counts as needed  - Recommend, monitor, and adjust tube feedings and TPN based on assessed needs  - Assess need for intravenous fluids  - Provide  nutrition/hydration education as appropriate  - Include patient/family/caregiver in decisions related to nutrition  Outcome: Progressing

## 2022-02-28 ENCOUNTER — TRANSITIONAL CARE MANAGEMENT (OUTPATIENT)
Dept: INTERNAL MEDICINE CLINIC | Facility: CLINIC | Age: 57
End: 2022-02-28

## 2022-02-28 ENCOUNTER — TELEPHONE (OUTPATIENT)
Dept: INTERNAL MEDICINE CLINIC | Facility: CLINIC | Age: 57
End: 2022-02-28

## 2022-02-28 NOTE — TELEPHONE ENCOUNTER
Critical access hospital is requesting a call back to clarify what ostomy supplies are needed for the patient  Received order but order is not clear      # [phone]  Reference the patient's name and

## 2022-02-28 NOTE — UTILIZATION REVIEW
Notification of Discharge   This is a Notification of Discharge from our facility [address]  Please be advised that this patient has been discharge from our facility  Below you will find the admission and discharge date and time including the patients disposition  UTILIZATION REVIEW CONTACT:  Wilman Lundy  Utilization   Network Utilization Review Department  Phone: [phone] x carefully listen to the prompts  All voicemails are confidential   Email: [email or web address]     PHYSICIAN ADVISORY SERVICES:  FOR [facility] REVIEW - MEDICAL NECESSITY DENIAL  Phone: [phone]  Fax: [phone]  Email: [email or web address]     PRESENTATION DATE: 2/18/2022  3:09 PM  OBERVATION ADMISSION DATE:   INPATIENT ADMISSION DATE: 2/18/22  6:29 PM   DISCHARGE DATE: 2/25/2022  2:51 PM  DISPOSITION: Home with [place] with [address] INFORMATION:  Send all requests for admission clinical reviews, approved or denied determinations and any other requests to dedicated fax number below belonging to the campus where the patient is receiving treatment   List of dedicated fax numbers:  [address] [place] [address] DENIALS (Administrative/Medical Necessity) [phone]   [address] [place] [address] (Maternity/NICU/Pediatrics) [phone]   [facility] [phone]   [address] [facility] [phone]   [address] [place] [address] [phone]   [address] [place] [address] [address] [place] [address],[address] [phone]   [facility]  [phone]   [address] [place], [place] [address] [facility] [address] [facility] [phone]

## 2022-02-28 NOTE — TELEPHONE ENCOUNTER
Called adapt spoke to [facility] and was notified and given  Dr Armida Valdes number to call for supplies

## 2022-03-01 ENCOUNTER — OFFICE VISIT (OUTPATIENT)
Dept: HEMATOLOGY ONCOLOGY | Facility: CLINIC | Age: 57
End: 2022-03-01
Payer: COMMERCIAL

## 2022-03-01 VITALS
RESPIRATION RATE: 18 BRPM | HEART RATE: 97 BPM | BODY MASS INDEX: 34.36 KG/M2 | DIASTOLIC BLOOD PRESSURE: 68 MMHG | TEMPERATURE: 97.3 F | OXYGEN SATURATION: 97 % | HEIGHT: 70 IN | SYSTOLIC BLOOD PRESSURE: 122 MMHG | WEIGHT: 240 LBS

## 2022-03-01 DIAGNOSIS — C18.4 MALIGNANT NEOPLASM OF TRANSVERSE COLON (HCC): ICD-10-CM

## 2022-03-01 DIAGNOSIS — C18.4 MALIGNANT NEOPLASM OF TRANSVERSE COLON (HCC): Primary | ICD-10-CM

## 2022-03-01 DIAGNOSIS — D50.0 IRON DEFICIENCY ANEMIA DUE TO CHRONIC BLOOD LOSS: Primary | ICD-10-CM

## 2022-03-01 PROBLEM — D50.9 IRON DEFICIENCY ANEMIA, UNSPECIFIED: Status: ACTIVE | Noted: 2022-03-01

## 2022-03-01 PROCEDURE — 99215 OFFICE O/P EST HI 40 MIN: CPT | Performed by: INTERNAL MEDICINE

## 2022-03-01 RX ORDER — FLUOROURACIL 50 MG/ML
400 INJECTION, SOLUTION INTRAVENOUS ONCE
Status: CANCELLED | OUTPATIENT
Start: 2022-03-23

## 2022-03-01 RX ORDER — DEXTROSE MONOHYDRATE 50 MG/ML
20 INJECTION, SOLUTION INTRAVENOUS ONCE
Status: CANCELLED | OUTPATIENT
Start: 2022-03-23

## 2022-03-01 RX ORDER — SODIUM CHLORIDE 9 MG/ML
20 INJECTION, SOLUTION INTRAVENOUS ONCE
Status: CANCELLED | OUTPATIENT
Start: 2022-03-23

## 2022-03-01 RX ORDER — SODIUM CHLORIDE 9 MG/ML
20 INJECTION, SOLUTION INTRAVENOUS ONCE
Status: CANCELLED | OUTPATIENT
Start: 2022-03-08

## 2022-03-01 NOTE — PROGRESS NOTES
Hematology/Oncology Progress Note    Date of Service: 3/1/2022    Formerly [facility]  [address] [place] [id number] [phone]    Hem/Onc Problem List:     Colon cancer  Chief Complaint:   Post hospital follow-up    Assessment/Plan:     1  Metastatic colon cancer with extensive liver metastasis, MMR expression intact  Status post colostomy  The patient will benefit from systemic chemotherapy [id number] with bevacizumab with/without Neulasta support as per NCCN guideline  I will send the biopsy specimen for OncotypeMAP test   Patient already had the port placed by IR  I personally reviewed the lab results,  the image studies,  pathology report, other specialty/physicians consult notes,  and outside medical records  I had a lengthy discussion with the patient and patient's family  We discussed the finding from the image studies, pathology, diagnosis, cancer biology, disease stage, next step management plan, and treatment options  The patient understood that he has a  Metastatic colon cancer  The patient will benefit from systemic chemotherapy + Avastin as per NCCN guideline  I will send the biopsy specimen for molecular test(NGS OncotypeMAP) looking for treatable targets and the eligibility of immunotherapy  Patient also understood that all the treatments are palliative  Hopefully, we can prolong his life with a fairly good life quality  Patient fullly understood and agreed to the plan  The patient will have a formal chemoeducation  We expect to start chemotherapy in 1-2 weeks after consent signed      We also discussed the risks or the side effects of the chemotherapy including but not limited to drug reaction including skin rash, anaphylactic reaction, hair loss, oral thrush, nailbed change, possibly hearing loss,  GI toxicity such as nausea or vomiting, poor appetite, abdominal pain, diarrhea or constipation, GI bleeding; headache, hypertension; bone marrow toxicity such as anemia, thrombocytopenia, neutropenia or neutropenic fever; peripheral neuropathy, renal toxicity such as increased creatinine and BUN, acute kidney injury, dehydration, electrolyte abnormality, and bleeding  Patient may also need to be hospitalized if patient develops neutropenic fever, severe thrombocytopenia with bleeding, or other life-threatening complications  Port infection can happen if patient has a port in place  2  Macrocytic hypochromic anemia likely due to GI bleeding  I will give patient Feraheme  Check CBC and CMP  3  Elevated alkaline phosphatase and transaminase due to liver metastasis  I will continue to treat underlying colon cancer  4  Follow-up:  Return to clinic in 1 week after cycle 1 chemotherapy    Greater than 50% of this 60 minute visit was spent in counseling, as detailed above  Disclaimer: This document was prepared using [employer] technology  If a word or phrase is confusing, or does not make sense, this is likely due to recognition error which was not discovered during the providers review  If you believe an error has occurred, please Contact me through [employer] service for dorothy? cation  AJCC 8th Edition Cancer Stage :    Cancer Staging  No matching staging information was found for the patient  Hematology/Oncology History:     · February 18, 2022 Patient was admitted to hospital because of abdominal pain  CT abdomen:  IMPRESSION:     1  Distal transverse colonic apple core lesion causing a degree of obstruction with dilated upstream colon with mild surrounding fat stranding  Small foci of gas peripheral to colonic stool may represent trapped air; however, pneumatosis cannot be   entirely excluded      2  Innumerable hepatic metastases      3  Nonenlarged mesenteric lymph nodes adjacent to the colonic mass, concerning for metastatic disease, given their location    · February 19, 2022, Chest wo contrast:  6 mm right upper lobe lung nodule  No definite metastatic disease in the chest   · February 20, 2022, CT-guided biopsy of the liver lesion consistent with colorectal primary  Final Diagnosis   A  Liver, biopsy:  - Adenocarcinoma, consistent with colorectal primary      Comment: Immunohistochemistry was performed on block A1  The tumor cells are positive for CK20, SATB2, CDX2,  and negative for TTF-1, NKX3 1, Ck7; DPC-4 shows no loss, supporting the above diagnosis  Dr Merline Dunnings is notified of the diagnosis in [employer] via [employer] on 2/24/2022  at 11:36 am    Immunohistochemistry [facility]) testing for Mismatch Repair (MMR) proteins has been requested on block A1, and the results will be issued in an addendum         · February 20, 2022  MMR expression intact   2, CA 19-9 1848  Alkaline phosphatase elevated  · March 12, 2022 CT abdomen pelvis with contrast:  Postoperative changes of recent right mid abdominal diverting colostomy  No evidence of traction  Innumerable hepatic metastasis similar to the prior exam   ·   History of Present Illiness:   Merritt Whiting is a 61 y o  male with the above-noted HemOnc history who is here for post hospital follow-up  Patient has past medical history of diabetes mellitus, hypertension and hyperlipidemia, GERD who was admitted hospital on February 18, 2022 because of abdominal pain  CT scan showed transverse colonic able core lesion measuring 3 7 cm in length with obstruction  Innumerable hepatic metastasis with largest one measuring 7 2 x 6 2 cm  Nonenlarged mesenteric lymph nodes adjacent to the colonic mass concerning for metastatic disease as well  CT chest showed noncalcified subcentimeter nodule in the right upper lobe,  which was indeterminate   2, CA 19-9 1848  Alkaline phosphatase elevated      Patient then underwent diagnostic laparoscopy with diverting loop transverse colostomy secondary to near obstructive apple core lesion of distal transverse colon and liver biopsy on February 20, 2020  Pathology consistent with metastatic colon cancer  MMR are normally expressed  Lab showed microcytic hypochromic anemia and leukocytosis  Patient feels fine  Patient tolerated the procedure very well  No fever or chills  No exertional chest pain, diaphoresis or shortness  of breath  No cough and phlegm, no hemoptysis  Patient denied nausea  and vomiting  No abdominal pain  No diarrhea or constipation  No  symptoms  No headache or blurred vision  No seizure activity  Appetite good  No significant weight loss or weight gain  The patient denies bleeding anywhere  ROS: A 12-point of review of systems is obtained and other than the above is noncontributory  Objective:   VITALS:   /68 (BP Location: Left arm, Patient Position: Sitting, Cuff Size: Adult)   Pulse 97   Temp (!) 97 3 °F (36 3 °C) (Temporal)   Resp 18   Ht 5' 10" (1 778 m)   Wt 109 kg (240 lb)   SpO2 97%   BMI 34 44 kg/m²     Physical EXAM:  General:  Alert, cooperative, no distress, appears stated age  Head:  Normocephalic, without obvious abnormality, atraumatic  Eyes:  Conjunctivae/corneas clear  PERRL, EOMs intact  No evidence of conjunctivitis     Throat: Lips, mucosa, and tongue normal   No oral thrush  Neck: Supple, symmetrical, trachea midline, no adenopathy    Lungs:   Clear to auscultation bilaterally  Respiratory effort easy, nonlabored    Heart:  Regular rate and rhythm, S1, S2 normal, no murmur  Abdomen:   Soft, non-tender,nondistended  Bowel sounds normal  No masses,  No organomegaly  Colostomy bag in place  Extremities:   Lymph nodes: Extremities normal, no cyanosis or edema  No axillary or inguinal adenopathy   Skin: Skin color, texture normal  No rashes    Neurologic: A&Ox4   No focal neuro deficits       Allergies   Allergen Reactions    Shellfish-Derived Products - Food Allergy Anaphylaxis    Erythromycin GI Intolerance     Pt denies       Past Medical History:   Diagnosis Date    Acute renal failure ([facility] )     [id number] resolved    Diabetes mellitus ([place] [address] )     Enteritis 08/23/2016    Gastroparesis due to DM ([facility] ) 08/23/2016    GERD (gastroesophageal reflux disease)     Hernia, ventral 08/04/2016    Hyperlipidemia     Hypertension        Past Surgical History:   Procedure Laterality Date    COLOSTOMY N/A 2/20/2022    Procedure: COLOSTOMY LOOP, diverting;  Surgeon: Tonya Ibarra MD;  [facility];  Service: General    ESOPHAGOGASTRODUODENOSCOPY N/A 8/24/2016    Procedure: ESOPHAGOGASTRODUODENOSCOPY (EGD); Surgeon: Will Pierson MD;  [facility];   Service:     KIDNEY STONE SURGERY      LIVER BIOPSY LAPAROSCOPIC N/A 2/20/2022    Procedure: DIAGNOSTIC LAPAROSCOPIC LIVER BIOPSY, DIVERTING LOOP COLOSTOMY ;  Surgeon: Tonya Ibarra MD;  [facility];  Service: General    TONSILLECTOMY      UMBILICAL HERNIA REPAIR      UMBILICAL HERNIA REPAIR LAPAROSCOPIC N/A 4/26/2019    Procedure: LAPAROSCOPIC UMBILICAL HERNIA REPAIR;  Surgeon: Tonya Ibarra MD;  [facility];  Service: General       Family History   Problem Relation Age of Onset    Diabetes Mother         mellitus    Lung cancer Mother     Coronary artery disease Father        Social History     Socioeconomic History    Marital status: /Civil Union     Spouse name: Not on file    Number of children: Not on file    Years of education: Not on file    Highest education level: Not on file   Occupational History    Occupation: ACTOR     Employer: [employer]   Tobacco Use    Smoking status: Never Smoker    Smokeless tobacco: Never Used   Vaping Use    Vaping Use: Never used   Substance and Sexual Activity    Alcohol use: Yes     Comment: occasion    Drug use: No    Sexual activity: Yes     Partners: Female   Other Topics Concern    Not on file   Social History Narrative    Not on file     Social Determinants of Health     Financial Resource Strain: Not on file   Food Insecurity: No Food Insecurity    Worried About Running Out of Food in the Last Year: Never true    Ran Out of Food in the Last Year: Never true   Transportation Needs: No Transportation Needs    Lack of Transportation (Medical): No    Lack of Transportation (Non-Medical): No   Physical Activity: Sufficiently Active    Days of Exercise per Week: 7 days    Minutes of Exercise per Session: 60 min   Stress: No Stress Concern Present    Feeling of Stress : Not at all   Social Connections: Not on file   Intimate Partner Violence: Not on file   Housing Stability: Low Risk     Unable to Pay for Housing in the Last Year: No    Number of Places Lived in the Last Year: 1    Unstable Housing in the Last Year: No       Current Outpatient Medications   Medication Sig Dispense Refill    amLODIPine (NORVASC) 5 mg tablet TAKE 1 TABLET BY MOUTH TWICE A  tablet 0    aspirin 81 MG tablet Take 81 mg by mouth daily        atorvastatin (LIPITOR) 40 mg tablet Take 1 tablet (40 mg total) by mouth daily 90 tablet 3    B-D UF III MINI PEN NEEDLES 31G X 5 MM MISC BY DOES NOT APPLY ROUTE 4 (FOUR) TIMES A  each 3    lisinopril (ZESTRIL) 40 mg tablet Take 1 tablet (40 mg total) by mouth daily 90 tablet 3    metoclopramide (REGLAN) 10 mg tablet TAKE 1 TABLET BY MOUTH TWICE A  tablet 0    Needle, Disp, (HYPODERMIC NEEDLE) 23G X 1-1/2" MISC by Does not apply route once a week 10 each 6    Ostomy Supplies MISC Use in the morning 100 each 3    oxyCODONE (ROXICODONE) 5 immediate release tablet Take 1 tablet (5 mg total) by mouth every 4 (four) hours as needed for moderate pain for up to 10 days Max Daily Amount: 30 mg (Patient taking differently: Take 5 mg by mouth as needed for moderate pain  ) 30 tablet 0    pantoprazole (PROTONIX) 40 mg tablet Take 1 tablet (40 mg total) by mouth 2 (two) times a day 180 tablet 3    glyBURIDE-metFORMIN (GLUCOVANCE) 5-500 MG per tablet TAKE 2 TABLETS BY MOUTH TWICE A DAY (Patient not taking: Reported on 3/1/2022) 360 tablet 0    insulin glargine (Lantus) 100 units/mL subcutaneous injection Inject 10 Units under the skin daily at bedtime (Patient not taking: Reported on 3/1/2022 ) 10 mL 0    sildenafil (VIAGRA) 100 mg tablet Take 1 tablet (100 mg total) by mouth daily as needed for erectile dysfunction for up to 60 doses 1 hour prior to needing on an empty stomach (Patient not taking: Reported on 3/1/2022 ) 60 tablet 3    sitaGLIPtin (JANUVIA) 100 mg tablet Take 1 tablet (100 mg total) by mouth daily (Patient not taking: Reported on 3/1/2022 ) 90 tablet 3     No current facility-administered medications for this visit  DATA REVIEW:    Pathology Result:    Final Diagnosis   Date Value Ref Range Status   02/20/2022   Final    A  Liver, biopsy:  - Adenocarcinoma, consistent with colorectal primary  Comment: Immunohistochemistry was performed on block A1  The tumor cells are positive for CK20, SATB2, CDX2,  and negative for TTF-1, NKX3 1, Ck7; DPC-4 shows no loss, supporting the above diagnosis  Dr Francia Li is notified of the diagnosis in [employer] via [employer] on 2/24/2022  at 11:36 am    Immunohistochemistry [facility]) testing for Mismatch Repair (MMR) proteins has been requested on block A1, and the results will be issued in an addendum  08/24/2016   Final    A  Small bowel (biopsy):  - Duodenal mucosa with no diagnostic abnormality  - No Marsh lesion  - Negative for dysplasia     B  Stomach, body (biopsy):  - Chronic inactive oxyntic gastritis  - No H pylori identified on immunohistochemistry stain  - No intestinal metaplasia or dysplasia    C  Distal esophagus (biopsy):  - Cardiac-type mucosa with intestinal metaplasia   - Negative for dysplasia     Comment: This biopsy shows gastric-type mucosa with scattered goblet cells  The diagnosis in this case depends on the location of this biopsy    If this biopsy was taken from the tubular esophagus, it represents Esparza mucosa of the distinctive type  If this biopsy was taken from the gastric cardia, it represents intestinal metaplasia of the gastric cardia  Interpretation performed at [facility], [address] [place] [id number]            Image Results: They are reviewed and documented in Hematology/Oncology history    CT chest wo contrast  Narrative: CT CHEST WITHOUT IV CONTRAST    INDICATION:   cancer  COMPARISON:  None  TECHNIQUE: CT examination of the chest was performed without intravenous contrast   Axial, sagittal, and coronal 2D reformatted images were created from the source data and submitted for interpretation  Radiation dose length product (DLP) for this visit:  600 mGy-cm   This examination, like all CT scans performed in the [facility], was performed utilizing techniques to minimize radiation dose exposure, including the use of iterative   reconstruction and automated exposure control  FINDINGS:    LUNGS:  Scattered tiny calcified and noncalcified bilateral nodules (series 3 images 65, 79, 91, 92, 96 and 103), the largest measuring 6 mm in the right upper lobe (series 3 image 42), likely with a small amount of rim calcification  No consolidation   or edema  PLEURA:  Unremarkable  HEART/GREAT VESSELS: Atherosclerotic coronary artery calcification is noted  Mitral annular calcifications  Heart is otherwise unremarkable  No thoracic aortic aneurysm  MEDIASTINUM AND NITA:  Small hiatal hernia noted  No mediastinal or hilar lymphadenopathy  CHEST WALL AND LOWER NECK:   Unremarkable  VISUALIZED STRUCTURES IN THE UPPER ABDOMEN:  Distal transverse colonic mass and hepatic metastases again noted, better evaluated on recent dedicated CT  Right renal simple cyst     OSSEOUS STRUCTURES:  No acute fracture or destructive osseous lesion    Impression: No definite metastatic disease in the chest       Scattered tiny calcified and noncalcified bilateral nodules, the largest measuring 6 mm in the right upper lobe, likely with a small amount of rim calcification  Based on current Fleischner Society 2017 Guidelines on incidental pulmonary nodule, patients   with a known malignancy are at increased risk of metastasis and should receive initial three month follow-up chest CT  The study was marked in [facility] for immediate notification  Workstation performed: [id number]        LABS:  Lab data are reviewed and documented in HemOnc history  No results found for this or any previous visit (from the past 48 hour(s))      Gurjit Mancilla MD  3/1/2022, 11:50 AM

## 2022-03-02 ENCOUNTER — OFFICE VISIT (OUTPATIENT)
Dept: SURGERY | Facility: CLINIC | Age: 57
End: 2022-03-02

## 2022-03-02 ENCOUNTER — TELEPHONE (OUTPATIENT)
Dept: INTERNAL MEDICINE CLINIC | Facility: CLINIC | Age: 57
End: 2022-03-02

## 2022-03-02 ENCOUNTER — PATIENT OUTREACH (OUTPATIENT)
Dept: HEMATOLOGY ONCOLOGY | Facility: CLINIC | Age: 57
End: 2022-03-02

## 2022-03-02 VITALS
TEMPERATURE: 98 F | DIASTOLIC BLOOD PRESSURE: 78 MMHG | HEIGHT: 70 IN | WEIGHT: 239 LBS | OXYGEN SATURATION: 96 % | SYSTOLIC BLOOD PRESSURE: 120 MMHG | BODY MASS INDEX: 34.22 KG/M2 | HEART RATE: 111 BPM | RESPIRATION RATE: 16 BRPM

## 2022-03-02 DIAGNOSIS — Z48.89 POSTOPERATIVE VISIT: ICD-10-CM

## 2022-03-02 DIAGNOSIS — C22.8 METASTASIS FROM MALIGNANT NEOPLASM OF LIVER (HCC): ICD-10-CM

## 2022-03-02 DIAGNOSIS — C18.4 CANCER OF TRANSVERSE COLON (HCC): Primary | ICD-10-CM

## 2022-03-02 DIAGNOSIS — C79.9 METASTASIS FROM MALIGNANT NEOPLASM OF LIVER (HCC): ICD-10-CM

## 2022-03-02 PROCEDURE — 99024 POSTOP FOLLOW-UP VISIT: CPT | Performed by: SURGERY

## 2022-03-02 NOTE — PROGRESS NOTES
Post-Op Follow Up- General Surgery   Demetrio Zamora 61 y o  male MRN: [id number]  Unit/Bed#:  Encounter: [id number]    Assessment/Plan     Assessment:  Status post laparoscopic diverting loop colostomy and liver biopsy, improved  Stage IV colon cancer with metastasis to liver  Plan:  Patient is doing quite well from the surgical standpoint  He is scheduled to start chemotherapy in 1 week  He is discharged from my care and I will be glad to see him if any problem arises in the future  History of Present Illness     HPI:  Demetrio Zamora is a 61 y o  male who presents   To my office accompanied by his wife for 1st postop follow-up after laparoscopic liver biopsy and transverse loop colostomy for near obstructing transverse colon cancer with metastasis to liver  Patient offers no complaints at this time  He is here to remove jordi from the colostomy  Historical Information   Past Medical History:   Diagnosis Date    Acute renal failure ([facility] )     [id number] resolved    Diabetes mellitus ([facility] )     Enteritis 08/23/2016    Gastroparesis due to DM ([facility] ) 08/23/2016    GERD (gastroesophageal reflux disease)     Hernia, ventral 08/04/2016    Hyperlipidemia     Hypertension      Past Surgical History:   Procedure Laterality Date    COLOSTOMY N/A 2/20/2022    Procedure: COLOSTOMY LOOP, diverting;  Surgeon: Mia Vivas MD;  [facility];  Service: General    ESOPHAGOGASTRODUODENOSCOPY N/A 8/24/2016    Procedure: ESOPHAGOGASTRODUODENOSCOPY (EGD); Surgeon: Noé Womack MD;  [facility];   Service:     KIDNEY STONE SURGERY      LIVER BIOPSY LAPAROSCOPIC N/A 2/20/2022    Procedure: DIAGNOSTIC LAPAROSCOPIC LIVER BIOPSY, DIVERTING LOOP COLOSTOMY ;  Surgeon: Mia Vivas MD;  [facility];  Service: General    TONSILLECTOMY      UMBILICAL HERNIA REPAIR      UMBILICAL HERNIA REPAIR LAPAROSCOPIC N/A 4/26/2019    Procedure: LAPAROSCOPIC UMBILICAL HERNIA REPAIR;  Surgeon: Cristofer Castillo Dean Quigley MD;  [facility];  Service: General     Social History   Social History     Substance and Sexual Activity   Alcohol Use Yes    Comment: occasion     Social History     Substance and Sexual Activity   Drug Use No     Social History     Tobacco Use   Smoking Status Never Smoker   Smokeless Tobacco Never Used     Family History: non-contributory    Meds/Allergies   all medications and allergies reviewed     Current Outpatient Medications:     amLODIPine (NORVASC) 5 mg tablet, TAKE 1 TABLET BY MOUTH TWICE A DAY, Disp: 180 tablet, Rfl: 0    aspirin 81 MG tablet, Take 81 mg by mouth daily  , Disp: , Rfl:     atorvastatin (LIPITOR) 40 mg tablet, Take 1 tablet (40 mg total) by mouth daily, Disp: 90 tablet, Rfl: 3    insulin glargine (Lantus) 100 units/mL subcutaneous injection, Inject 10 Units under the skin daily at bedtime, Disp: 10 mL, Rfl: 0    lisinopril (ZESTRIL) 40 mg tablet, Take 1 tablet (40 mg total) by mouth daily, Disp: 90 tablet, Rfl: 3    metoclopramide (REGLAN) 10 mg tablet, TAKE 1 TABLET BY MOUTH TWICE A DAY, Disp: 180 tablet, Rfl: 0    oxyCODONE (ROXICODONE) 5 immediate release tablet, Take 1 tablet (5 mg total) by mouth every 4 (four) hours as needed for moderate pain for up to 10 days Max Daily Amount: 30 mg (Patient taking differently: Take 5 mg by mouth as needed for moderate pain  ), Disp: 30 tablet, Rfl: 0    pantoprazole (PROTONIX) 40 mg tablet, Take 1 tablet (40 mg total) by mouth 2 (two) times a day, Disp: 180 tablet, Rfl: 3    sildenafil (VIAGRA) 100 mg tablet, Take 1 tablet (100 mg total) by mouth daily as needed for erectile dysfunction for up to 60 doses 1 hour prior to needing on an empty stomach, Disp: 60 tablet, Rfl: 3    sitaGLIPtin (JANUVIA) 100 mg tablet, Take 1 tablet (100 mg total) by mouth daily, Disp: 90 tablet, Rfl: 3    B-D UF III MINI PEN NEEDLES 31G X 5 MM MISC, BY DOES NOT APPLY ROUTE 4 (FOUR) TIMES A DAY, Disp: 100 each, Rfl: 3    glyBURIDE-metFORMIN (GLUCOVANCE) 5-500 MG per tablet, TAKE 2 TABLETS BY MOUTH TWICE A DAY (Patient not taking: Reported on 3/1/2022), Disp: 360 tablet, Rfl: 0    Needle, Disp, (HYPODERMIC NEEDLE) 23G X 1-1/2" MISC, by Does not apply route once a week, Disp: 10 each, Rfl: 6    Ostomy Supplies MISC, Use in the morning, Disp: 100 each, Rfl: 3  Allergies   Allergen Reactions    Shellfish-Derived Products - Food Allergy Anaphylaxis    Erythromycin GI Intolerance     Pt denies       Objective     Current Vitals:   Blood Pressure: 120/78 (03/02/22 1409)  Pulse: (!) 111 (03/02/22 1409)  Temperature: 98 °F (36 7 °C) (03/02/22 1409)  Temp Source: Temporal (03/02/22 1409)  Respirations: 16 (03/02/22 1409)  Height: 5' 10" (177 8 cm) (03/02/22 1409)  Weight - Scale: 108 kg (239 lb) (03/02/22 1409)  SpO2: 96 % (03/02/22 1409)    Physical Exam  Vitals and nursing note reviewed  Abdominal:      Comments:   Abdomen is soft, nondistended and nontender  Incision from laparoscopy a completely healed  Colostomy is working well  Johnie removed in the office without any issues

## 2022-03-02 NOTE — PROGRESS NOTES
MSW received referral from Med Onc for pt  Pt had completed a Distress Thermometer and did not indicate any distress, however at the check out, he and his wife presented as emotional and very stressed  They shared with the MR that they were given a poor diagnosis and were informed that the pt had a life expectancy of 5 years  They were in agreement to an oncology social wok referral     MSW made outreach this day and the received the pt's voicemail  A detailed message was left, along with a contact number and the pt was encouraged to return the call

## 2022-03-02 NOTE — TELEPHONE ENCOUNTER
I reached out to Nteta Clemens to let her know that I have the numbers for the specific supplies that the pt needs   Tanesha stated she will reach out if she needs them

## 2022-03-02 NOTE — TELEPHONE ENCOUNTER
[facility] received an order for Ostomy supplies but need to know what specific supplies the patient needs       # [phone]

## 2022-03-03 ENCOUNTER — PATIENT OUTREACH (OUTPATIENT)
Dept: HEMATOLOGY ONCOLOGY | Facility: CLINIC | Age: 57
End: 2022-03-03

## 2022-03-03 ENCOUNTER — TELEPHONE (OUTPATIENT)
Dept: INTERVENTIONAL RADIOLOGY/VASCULAR | Facility: HOSPITAL | Age: 57
End: 2022-03-03

## 2022-03-03 NOTE — PROGRESS NOTES
Biopsychosocial and Barriers Assessment    Cancer Diagnosis: Stage IV Colon Cancer with Mets  Home/Cell Phone: [phone]  Emergency Contact: Vika wife  Marital Status:   Interpretation concerns, speaks another language, preferred language: English  Cultural concerns: None reported  Ability to read or write: Fully Literate    Caregiver/Support: my wife is my "Super Hero"  ,Children: 2 children 12, boy, 15, daughter  Child/Elder care:  Teens    Housing: Own home  Home Setup: 3 story, bedroom on the second floor  Lives With: spouse and children  Daily Living Activities: Pt had been working FT and now reports he can do very little as he "tires so easily "  Durable Medical Equipment: None  Ambulation: Ambulates independently    [facility]erred [facility] co-pays with insurance: Pt has [employer] and will have this through the end of the year  His wife and children have Marketplace insurance  High co-pays with medication coverage: No  No medication coverage: Pt has prescription coverage through [facility]    Primary Care Provider: Dr James Youngblood  Hx of [address]: Yes, after recent hospitalization  Hx of Short term rehab: No  Mental Health Hx: None reported  Substance Abuse Hx: None reported  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   Status/Location: No  Ability to pay bills: Pt has no income at this time and will utilize his savings  POA/LW/AD: No  Transportation Plan/Concerns: Pt and his wife drive      What do you know about your Cancer Diagnosis    What has your doctor told you about your cancer diagnosis: Pt learned this week that he has Stage IV metastatic cancer  He shared that he learned his cancer is "treatable but not curable "    What has your doctor told you about your cancer treatment: Pt was able to clearly discuss how he will be getting a port next week and start chemo the following week       What specific concerns do you have about your diagnosis and treatment: Pt clearly verbalized how overwhelmed both he and his wife are feeling at this time  Have you been made aware of any hair loss associated with treatment: Not discussed    Additional Comments:  Pt was given a Distress Thermometer at [facility] and self scored a 1 and did not indicate any stressors  He later shared that he was not expecting to learn that he had cancer  After his appointment, the pt and his wife were both "extrememly emotional" and an Oncology Social Work referral was placed  MSW reached out to the pt this day and he was open and receptive of this call  He explained how "overwhlemed" both he and his wife have been feeling since the appointment with oncology  The pt shared that he "never expected to be given the news" he was given and was "not prepared" to hear that he had Stage IV with a life expectancy of 5 years  The pt repeated stated how he "can't get past the fact that he won't be cured "  MSW spent a great deal of time listening to the pt as he processed this information and how his life will be "forever changed "  He spoke of how 2 weeks prior he had gone to the ED with stomach pain and learned that he had a colonic tumor  The pt shared his story of how the tumor was removed and he had a colostomy and thought the cancer was gone  He and his wife were not prepared to hear otherwise  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 and has traveled with the show  Due to the nature of his work and the travel, his wife has been a stay-at-home mom and managed their life at home while he is on the road  Once the patient was hospitalized, he was no longer employed, explaining that he is considered an   He has no benefits and they currently have no income    MSW provided information on SSDI and SSI and the pt began the application while on the phone   He states that they have a "significant amount of money in savings" but also realizes how quickly that can dwindle  MSW encouraged the pt to call the SSI office after he submitted his application to inquire if there was any other option for him or if he would qualify for an exceptional allowance given he has liver mets  Pt verbalized understanding and was planning on calling this day  Pt shared that he and his wife have spoken with their 12and 15year olds and while they have told them about the cancer, they have not used the word "incurable "  MSW supported the pt's decision and encouraged him to not feel the need to over inform, rather just answer questions as they arise  The children are aware the pt will start chemo and have a port placed  MSW spoke about the support groups offered remotely through the [address] however the pt presented as too overwhelmed to process this information  MSW did explain that his assigned MSW will return next week and will be available to him  [place] conversation was also discussed and the pt is very much interested in this and feels his wife will be as well  Pt's first infusion is 3/14/22 at [facility] and MSW assured him that PALLAVI Russo will be notified  Pt sounded less anxious at the end of the call and he was encouraged to reach out if he needed anything before next week  Significant support offered

## 2022-03-03 NOTE — PROGRESS NOTES
Spoke with patient to go over pre-procedure information for his port placement 3/10/22 at the [facility]  Plan to arrive for 0900, have a ride to and from, and NPO after midnight the night prior  Patient is able to take morning medications with sips of water  Answered all questions, consult complete

## 2022-03-03 NOTE — TELEPHONE ENCOUNTER
PATIENT STATES THAT [facility] NEEDS CLINICAL NOTES AND ORIGINAL SCRIPTS FOR OSTOMY SUPPLIES    HE DID NOT HAVE THEIR FAX #

## 2022-03-07 ENCOUNTER — DOCUMENTATION (OUTPATIENT)
Dept: HEMATOLOGY ONCOLOGY | Facility: CLINIC | Age: 57
End: 2022-03-07

## 2022-03-07 ENCOUNTER — OFFICE VISIT (OUTPATIENT)
Dept: INTERNAL MEDICINE CLINIC | Facility: CLINIC | Age: 57
End: 2022-03-07
Payer: COMMERCIAL

## 2022-03-07 VITALS
WEIGHT: 239.4 LBS | BODY MASS INDEX: 34.27 KG/M2 | TEMPERATURE: 98.4 F | HEIGHT: 70 IN | SYSTOLIC BLOOD PRESSURE: 126 MMHG | RESPIRATION RATE: 18 BRPM | HEART RATE: 102 BPM | OXYGEN SATURATION: 96 % | DIASTOLIC BLOOD PRESSURE: 78 MMHG

## 2022-03-07 DIAGNOSIS — E11.9 TYPE 2 DIABETES MELLITUS WITHOUT COMPLICATION, WITHOUT LONG-TERM CURRENT USE OF INSULIN (HCC): Primary | ICD-10-CM

## 2022-03-07 DIAGNOSIS — C18.4 MALIGNANT NEOPLASM OF TRANSVERSE COLON (HCC): ICD-10-CM

## 2022-03-07 PROCEDURE — 1111F DSCHRG MED/CURRENT MED MERGE: CPT | Performed by: NURSE PRACTITIONER

## 2022-03-07 PROCEDURE — 99495 TRANSJ CARE MGMT MOD F2F 14D: CPT | Performed by: NURSE PRACTITIONER

## 2022-03-07 RX ORDER — MULTIVIT-MIN/IRON FUM/FOLIC AC 7.5 MG-4
1 TABLET ORAL DAILY
COMMUNITY

## 2022-03-07 RX ORDER — ONDANSETRON 4 MG/1
4 TABLET, FILM COATED ORAL EVERY 6 HOURS PRN
COMMUNITY
Start: 2022-01-10

## 2022-03-07 NOTE — ASSESSMENT & PLAN NOTE
The patient was recently hospitalized at which time it was noted he had a tumor in his transverse colon which was resected during this hospitalization with placement of a colostomy  Biopsy of the liver was performed at that time with a diagnosis of metastatic disease  The patient will be starting chemotherapy next week

## 2022-03-07 NOTE — PROGRESS NOTES
 Communication Barriers: none     Patient Barriers: none       Physical Barriers: Are there any special accommodations you need for your visit? no     Assessment completed with:Patient     Patient's mood:overwhelmed but hopeful "ready to get things started"    Smoking Cessation: no     Social Work: Referral made to cancer counselor? Rolf De Leon     Practical Barrier: Current living situation: Spouse Vika and 2 children     Transportation Barriers: none     Pain Assessment: none     Do you understand your diagnosis? yes     Complex Care Coordination: 3/10 port, 3/14 chemo f/u w/[place] 3/17     Genetic testing: Have you had genetic testing in the past? Are you interested in testing? Referral made to Genetic Counselor? Date? For what disease site?  Imaging:  None needed at this time     Interventional Radiology: [facility] is scheduled for 3/10     Support Groups: emailed Shana 67 calendar     Is the patient aware of [address]? Info provided? yes     Family History: Do you have a family history of cancer?  Fertility Specialist: Do you have any fertility concerns? Information provided?  None needed

## 2022-03-07 NOTE — ASSESSMENT & PLAN NOTE
Patient brings with him today to the office a list of his recent blood sugars  The patient did stop his Glucovance recently however his blood sugars are now elevated again  I did recommend to the patient he start back on that medication once a day and continue to check his sugars  He will continue to adjust this medication  In addition I did discuss with the patient that we could certainly increase his Lantus at this time however he would like to go back on the oral medications as this kept her sugars well controlled  He did have 1 episode of hypoglycemia since discharge but at that time the patient had not been eating      Lab Results   Component Value Date    HGBA1C 7 5 (H) 02/10/2022

## 2022-03-07 NOTE — TELEPHONE ENCOUNTER
Oncology coordinator outreach done  Quality 130: Documentation Of Current Medications In The Medical Record: Current Medications Documented Quality 402: Tobacco Use And Help With Quitting Among Adolescents: Patient screened for tobacco and never smoked Detail Level: Detailed

## 2022-03-07 NOTE — PATIENT INSTRUCTIONS
10% - bad control"> 10% - bad control,Hemoglobin A1c (HbA1c) greater than 10% indicating poor diabetic control,Haemoglobin A1c greater than 10% indicating poor diabetic control">   Diabetes Mellitus Type 2 in Adults, Ambulatory Care   GENERAL INFORMATION:   Diabetes mellitus type 2  is a disease that affects how your body uses glucose (sugar)  Insulin helps move sugar out of the blood so it can be used for energy  Normally, when the blood sugar level increases, the pancreas makes more insulin  Type 2 diabetes develops because either the body cannot make enough insulin, or it cannot use the insulin correctly  After many years, your pancreas may stop making insulin  Common symptoms include the following:   · More hunger or thirst than usual     · Frequent urination     · Weight loss without trying     · Blurred vision  Seek immediate care for the following symptoms:   · Severe abdominal pain, or pain that spreads to your back  You may also be vomiting  · Trouble staying awake or focusing    · Shaking or sweating    · Blurred or double vision    · Breath has a fruity, sweet smell    · Breathing is deep and labored, or rapid and shallow    · Heartbeat is fast and weak  Treatment for diabetes mellitus type 2  includes keeping your blood sugar at a normal level  You must eat the right foods, and exercise regularly  You may also need medicine if you cannot control your blood sugar level with nutrition and exercise  Manage diabetes mellitus type 2:   · Check your blood sugar level  You will be taught how to check a small drop of blood in a glucose monitor  Ask your healthcare provider when and how often to check during the day  Ask your healthcare provider what your blood sugar levels should be when you check them  · Keep track of carbohydrates (sugar and starchy foods)  Your blood sugar level can get too high if you eat too many carbohydrates   Your dietitian will help you plan meals and snacks that have the right amount of carbohydrates  · Eat low-fat foods  Some examples are skinless chicken and low-fat milk  · Eat less sodium (salt)  Some examples of high-sodium foods to limit are soy sauce, potato chips, and soup  Do not add salt to food you cook  Limit your use of table salt  · Eat high-fiber foods  Foods that are a good source of fiber include vegetables, whole grain bread, and beans  · Limit alcohol  Alcohol affects your blood sugar level and can make it harder to manage your diabetes  Women should limit alcohol to 1 drink a day  Men should limit alcohol to 2 drinks a day  A drink of alcohol is 12 ounces of beer, 5 ounces of wine, or 1½ ounces of liquor  · Get regular exercise  Exercise can help keep your blood sugar level steady, decrease your risk of heart disease, and help you lose weight  Exercise for at least 30 minutes, 5 days a week  Include muscle strengthening activities 2 days each week  Work with your healthcare provider to create an exercise plan  · Check your feet each day  for injuries or open sores  Ask your healthcare provider for activities you can do if you have an open sore  · Quit smoking  If you smoke, it is never too late to quit  Smoking can worsen the problems that may occur with diabetes  Ask your healthcare provider for information about how to stop smoking if you are having trouble quitting  · Ask about your weight:  Ask healthcare providers if you need to lose weight, and how much to lose  Ask them to help you with a weight loss program  Even a 10 to 15 pound weight loss can help you manage your blood sugar level  · Carry medical alert identification  Wear medical alert jewelry or carry a card that says you have diabetes  Ask your healthcare provider where to get these items  · Ask about vaccines  Diabetes puts you at risk of serious illness if you get the flu, pneumonia, or hepatitis   Ask your healthcare provider if you should get a flu, pneumonia, or hepatitis B vaccine, and when to get the vaccine  Follow up with your healthcare provider as directed:  Write down your questions so you remember to ask them during your visits  CARE AGREEMENT:   You have the right to help plan your care  Learn about your health condition and how it may be treated  Discuss treatment options with your caregivers to decide what care you want to receive  You always have the right to refuse treatment  The above information is an  only  It is not intended as medical advice for individual conditions or treatments  Talk to your doctor, nurse or pharmacist before following any medical regimen to see if it is safe and effective for you  © 2014 [address] is for End User's use only and may not be sold, redistributed or otherwise used for commercial purposes  All illustrations and images included in CareNotes® are the copyrighted property of A D A M , Inc  or Slim Mcdonough

## 2022-03-07 NOTE — PROGRESS NOTES
INTERNAL MEDICINE TRANSITION OF CARE OFFICE VISIT  [facility] - [facility]    NAME: Tayla Hines  AGE: 61 y o  SEX: male  : 1965     DATE: 3/7/2022     Assessment and Plan:     Problem List Items Addressed This Visit        Digestive    Malignant neoplasm of transverse colon [facility])     The patient was recently hospitalized at which time it was noted he had a tumor in his transverse colon which was resected during this hospitalization with placement of a colostomy  Biopsy of the liver was performed at that time with a diagnosis of metastatic disease  The patient will be starting chemotherapy next week  Endocrine    Type 2 diabetes mellitus without complication, without long-term current use of insulin ([address] ) - Primary       Patient brings with him today to the office a list of his recent blood sugars  The patient did stop his Glucovance recently however his blood sugars are now elevated again  I did recommend to the patient he start back on that medication once a day and continue to check his sugars  He will continue to adjust this medication  In addition I did discuss with the patient that we could certainly increase his Lantus at this time however he would like to go back on the oral medications as this kept her sugars well controlled  He did have 1 episode of hypoglycemia since discharge but at that time the patient had not been eating      Lab Results   Component Value Date    HGBA1C 7 5 (H) 02/10/2022            Relevant Orders    HEMOGLOBIN A1C W/ EAG ESTIMATION           Transitional Care Management Review:     Tayla Hines is a 61 y o  male here for TCM follow-up    During the TCM phone call patient stated:    TCM Call (since 2022)     Date and time call was made  3/1/2022 10:35 AM    Hospital care reviewed  Records reviewed        Patient was hospitialized at  [facility]        Date of Admission  22    Date of discharge 02/25/22    Diagnosis  abdominal pain    Disposition  Home      TCM Call (since 2/4/2022)     Scheduled for follow up? Yes           HPI:   The patient presents to the office today for transition of care management visit  The patient was recently hospitalized at which time he was noted to have a tumor in his colon which was resected during this hospitalization  The patient does have an ostomy in place  He was noted to have extensive liver metastatic disease  He will start chemotherapy next week  His ostomy is functioning fine  He is changing his ostomy bag every 3-4 days  He continues with visiting nursing in the home  We will continue to titrate his diabetic medication  Nutrition during chemotherapy was discussed with the patient  The patient is due for his Tdap and is a candidate for a pneumonia vaccine however he would like to wait for these vaccines until he is feeling better  A diabetic eye exam and foot exam were deferred till his next visit  The following portions of the patient's history were reviewed and updated as appropriate: allergies, current medications, past family history, past medical history, past social history, past surgical history and problem list      Review of Systems:     Review of Systems   Constitutional: Positive for fatigue  HENT: Negative  Negative for congestion, postnasal drip, rhinorrhea and trouble swallowing  Eyes: Negative  Negative for visual disturbance  Respiratory: Negative  Negative for choking and shortness of breath  Cardiovascular: Negative  Negative for chest pain  Gastrointestinal: Positive for diarrhea (colostomy)  Endocrine: Negative  Genitourinary: Negative  Musculoskeletal: Negative  Negative for arthralgias, back pain, myalgias and neck pain  Skin: Negative  Neurological: Negative for dizziness and headaches  Psychiatric/Behavioral: Positive for sleep disturbance          Problem List:     Patient Active Problem List Diagnosis    Type 2 diabetes mellitus without complication, without long-term current use of insulin (HCC)    Benign essential hypertension    Mixed hyperlipidemia    Erectile dysfunction    Low testosterone    Testicular hypogonadism    Morbid obesity ([address] )    Incarcerated umbilical hernia    Left ureteral calculus    Type 2 diabetes mellitus with hyperlipidemia (HCC)    Colonic mass    Microcytic anemia    Hypokalemia    Transaminitis    Stage 3a chronic kidney disease (HCC)    Thrombocytosis    Iron deficiency anemia, unspecified    Malignant neoplasm of transverse colon (HCC)    Cancer of transverse colon ([address] )    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)    Postoperative visit        Objective:     /78 (BP Location: Left arm, Patient Position: Sitting, Cuff Size: Standard)   Pulse 102   Temp 98 4 °F (36 9 °C) (Oral)   Resp 18   Ht 5' 10" (1 778 m)   Wt 109 kg (239 lb 6 4 oz)   SpO2 96%   BMI 34 35 kg/m²     Physical Exam  Constitutional:       General: He is not in acute distress  Appearance: He is well-developed  HENT:      Head: Normocephalic and atraumatic  Eyes:      Pupils: Pupils are equal, round, and reactive to light  Cardiovascular:      Rate and Rhythm: Normal rate and regular rhythm  Heart sounds: Normal heart sounds  No murmur heard  Pulmonary:      Effort: Pulmonary effort is normal  No respiratory distress  Breath sounds: Normal breath sounds  No wheezing  Abdominal:      General: The ostomy site is clean  Musculoskeletal:         General: Normal range of motion  Cervical back: Normal range of motion  Skin:     General: Skin is warm and dry  Coloration: Skin is pale  Neurological:      Mental Status: He is alert and oriented to person, place, and time  Psychiatric:         Behavior: Behavior normal          Thought Content:  Thought content normal          Judgment: Judgment normal          Laboratory Results: I have personally reviewed the pertinent laboratory results/reports     Radiology/Other Diagnostic Testing Results: I have personally reviewed pertinent reports  CT chest wo contrast    Result Date: 2/20/2022  CT CHEST WITHOUT IV CONTRAST INDICATION:   cancer  COMPARISON:  None  TECHNIQUE: CT examination of the chest was performed without intravenous contrast   Axial, sagittal, and coronal 2D reformatted images were created from the source data and submitted for interpretation  Radiation dose length product (DLP) for this visit:  600 mGy-cm   This examination, like all CT scans performed in the [facility], was performed utilizing techniques to minimize radiation dose exposure, including the use of iterative reconstruction and automated exposure control  FINDINGS: LUNGS:  Scattered tiny calcified and noncalcified bilateral nodules (series 3 images 65, 79, 91, 92, 96 and 103), the largest measuring 6 mm in the right upper lobe (series 3 image 42), likely with a small amount of rim calcification  No consolidation or edema  PLEURA:  Unremarkable  HEART/GREAT VESSELS: Atherosclerotic coronary artery calcification is noted  Mitral annular calcifications  Heart is otherwise unremarkable  No thoracic aortic aneurysm  MEDIASTINUM AND NITA:  Small hiatal hernia noted  No mediastinal or hilar lymphadenopathy  CHEST WALL AND LOWER NECK:   Unremarkable  VISUALIZED STRUCTURES IN THE UPPER ABDOMEN:  Distal transverse colonic mass and hepatic metastases again noted, better evaluated on recent dedicated CT  Right renal simple cyst  OSSEOUS STRUCTURES:  No acute fracture or destructive osseous lesion  No definite metastatic disease in the chest   Scattered tiny calcified and noncalcified bilateral nodules, the largest measuring 6 mm in the right upper lobe, likely with a small amount of rim calcification   Based on current Fleischner Society 2017 Guidelines on incidental pulmonary nodule, patients  with a known malignancy are at increased risk of metastasis and should receive initial three month follow-up chest CT  The study was marked in [facility] for immediate notification  Workstation performed: [id number]     CT abdomen pelvis with contrast    Result Date: 2/18/2022  CT ABDOMEN AND PELVIS WITH IV CONTRAST INDICATION:   Abdominal pain, acute, nonlocalized abdominal pain  COMPARISON:  CT abdomen/pelvis 1/4/2020  TECHNIQUE:  CT examination of the abdomen and pelvis was performed  Axial, sagittal, and coronal 2D reformatted images were created from the source data and submitted for interpretation  Radiation dose length product (DLP) for this visit:  2200 mGy-cm   This examination, like all CT scans performed in the [facility], was performed utilizing techniques to minimize radiation dose exposure, including the use of iterative reconstruction and automated exposure control  IV Contrast:  100 mL of iohexol (OMNIPAQUE) Enteric Contrast:  Enteric contrast was not administered  FINDINGS: ABDOMEN LOWER CHEST:  No clinically significant abnormality identified in the visualized lower chest  LIVER/BILIARY TREE:  Innumerable, confluent hypodense lesions, largest measuring approximately 7 2 x 6 2 cm  No biliary ductal dilatation  GALLBLADDER:  There are gallstone(s) within the gallbladder, without pericholecystic inflammatory changes  SPLEEN:  Unremarkable  PANCREAS:  Unremarkable  ADRENAL GLANDS:  Unremarkable  KIDNEYS/URETERS:  No hydronephrosis or urinary tract calculus  One or more simple cysts and sharply circumscribed subcentimeter renal hypodensities are present, too small to accurately characterize, and statistically most likely benign findings  According to recent literature (Radiology 2019) no further workup of these findings is recommended   STOMACH AND BOWEL:  There is an apple core lesion in the distal transverse colon measuring approximately 3 7 cm in length with upstream transverse and right colonic dilatation and stool retention, with the ascending colon measuring up to 10 2 cm  The colon distal to the lesion is collapsed  Mild fat stranding surrounding the dilated colon  Small foci of gas peripheral to the colonic stool may represent trapped air; however, pneumatosis cannot be entirely excluded  Small bowel is within normal limits  APPENDIX:  No findings to suggest appendicitis  ABDOMINOPELVIC CAVITY:  Trace perihepatic and pelvic ascites  No pneumoperitoneum  Nonenlarged mesenteric lymph nodes adjacent to the colonic mass  VESSELS:  Unremarkable for patient's age  PELVIS REPRODUCTIVE ORGANS:  Unremarkable for patient's age  URINARY BLADDER:  Unremarkable  ABDOMINAL WALL/INGUINAL REGIONS:  Unremarkable  OSSEOUS STRUCTURES:  No acute fracture or destructive osseous lesion  Spinal degenerative changes are noted  1   Distal transverse colonic apple core lesion causing a degree of obstruction with dilated upstream colon with mild surrounding fat stranding  Small foci of gas peripheral to colonic stool may represent trapped air; however, pneumatosis cannot be entirely excluded  2   Innumerable hepatic metastases  3   Nonenlarged mesenteric lymph nodes adjacent to the colonic mass, concerning for metastatic disease, given their location  The study was marked in [facility] for immediate notification  Workstation performed: [id number]        Current Medications:     Outpatient Medications Prior to Visit   Medication Sig Dispense Refill    amLODIPine (NORVASC) 5 mg tablet TAKE 1 TABLET BY MOUTH TWICE A  tablet 0    aspirin 81 MG tablet Take 81 mg by mouth daily        atorvastatin (LIPITOR) 40 mg tablet Take 1 tablet (40 mg total) by mouth daily 90 tablet 3    B-D UF III MINI PEN NEEDLES 31G X 5 MM MISC BY DOES NOT APPLY ROUTE 4 (FOUR) TIMES A  each 3    Cholecalciferol (VITAMIN D3 PO) Take 400 Units by mouth daily      insulin glargine (Lantus) 100 units/mL subcutaneous injection Inject 10 Units under the skin daily at bedtime 10 mL 0    lisinopril (ZESTRIL) 40 mg tablet Take 1 tablet (40 mg total) by mouth daily 90 tablet 3    metoclopramide (REGLAN) 10 mg tablet TAKE 1 TABLET BY MOUTH TWICE A  tablet 0    Multiple Vitamins-Minerals (multivitamin with minerals) tablet Take 1 tablet by mouth daily      Needle, Disp, (HYPODERMIC NEEDLE) 23G X 1-1/2" MISC by Does not apply route once a week 10 each 6    Ostomy Supplies MISC Use in the morning 100 each 3    oxyCODONE (ROXICODONE) 5 immediate release tablet Take 1 tablet (5 mg total) by mouth every 4 (four) hours as needed for moderate pain for up to 10 days Max Daily Amount: 30 mg (Patient taking differently: Take 5 mg by mouth as needed for moderate pain  ) 30 tablet 0    pantoprazole (PROTONIX) 40 mg tablet Take 1 tablet (40 mg total) by mouth 2 (two) times a day 180 tablet 3    sitaGLIPtin (JANUVIA) 100 mg tablet Take 1 tablet (100 mg total) by mouth daily 90 tablet 3    glyBURIDE-metFORMIN (GLUCOVANCE) 5-500 MG per tablet TAKE 2 TABLETS BY MOUTH TWICE A  tablet 0    ondansetron (ZOFRAN) 4 mg tablet Take 4 mg by mouth every 6 (six) hours as needed      sildenafil (VIAGRA) 100 mg tablet Take 1 tablet (100 mg total) by mouth daily as needed for erectile dysfunction for up to 60 doses 1 hour prior to needing on an empty stomach (Patient not taking: Reported on 3/7/2022 ) 60 tablet 3     No facility-administered medications prior to visit         GAURANG Chen  [facility]

## 2022-03-08 DIAGNOSIS — E11.9 TYPE 2 DIABETES MELLITUS WITHOUT COMPLICATION, WITHOUT LONG-TERM CURRENT USE OF INSULIN (HCC): Primary | ICD-10-CM

## 2022-03-08 DIAGNOSIS — C18.4 MALIGNANT NEOPLASM OF TRANSVERSE COLON (HCC): Primary | ICD-10-CM

## 2022-03-08 RX ORDER — FLASH GLUCOSE SENSOR
1 KIT MISCELLANEOUS
Qty: 2 EACH | Refills: 6 | Status: SHIPPED | OUTPATIENT
Start: 2022-03-08 | End: 2022-03-11 | Stop reason: SDUPTHER

## 2022-03-09 ENCOUNTER — TELEPHONE (OUTPATIENT)
Dept: INTERNAL MEDICINE CLINIC | Facility: CLINIC | Age: 57
End: 2022-03-09

## 2022-03-09 NOTE — TELEPHONE ENCOUNTER
Can pt have an appt with rajat delaney on how to use the prolia glucose monitor    Would just need 5 minutes, please advise, call back

## 2022-03-09 NOTE — TELEPHONE ENCOUNTER
Called the patient and he stated that he has already received all his supplies from [facility] for his Ostomy bag and he has already established with them for future supplies, nothing further needed on our end

## 2022-03-10 ENCOUNTER — HOSPITAL ENCOUNTER (OUTPATIENT)
Dept: INTERVENTIONAL RADIOLOGY/VASCULAR | Facility: HOSPITAL | Age: 57
Discharge: HOME | End: 2022-03-10
Attending: INTERNAL MEDICINE | Admitting: RADIOLOGY
Payer: COMMERCIAL

## 2022-03-10 VITALS
DIASTOLIC BLOOD PRESSURE: 90 MMHG | WEIGHT: 239 LBS | RESPIRATION RATE: 18 BRPM | SYSTOLIC BLOOD PRESSURE: 173 MMHG | HEIGHT: 70 IN | HEART RATE: 91 BPM | BODY MASS INDEX: 34.22 KG/M2 | OXYGEN SATURATION: 98 % | TEMPERATURE: 97.6 F

## 2022-03-10 DIAGNOSIS — C18.4 MALIGNANT NEOPLASM OF TRANSVERSE COLON (HCC): ICD-10-CM

## 2022-03-10 DIAGNOSIS — D50.0 IRON DEFICIENCY ANEMIA DUE TO CHRONIC BLOOD LOSS: ICD-10-CM

## 2022-03-10 LAB
ALBUMIN SERPL-MCNC: 2.6 G/DL (ref 3.8–4.9)
ALBUMIN/GLOB SERPL: 0.8 {RATIO} (ref 1.2–2.2)
ALP SERPL-CCNC: 671 IU/L (ref 44–121)
ALT SERPL-CCNC: 44 IU/L (ref 0–44)
APPEARANCE UR: CLEAR
AST SERPL-CCNC: 77 IU/L (ref 0–40)
BACTERIA URNS QL MICRO: NORMAL
BASOPHILS # BLD AUTO: 0.1 X10E3/UL (ref 0–0.2)
BASOPHILS NFR BLD AUTO: 0 %
BILIRUB SERPL-MCNC: 0.9 MG/DL (ref 0–1.2)
BILIRUB UR QL STRIP: NEGATIVE
BUN SERPL-MCNC: 20 MG/DL (ref 6–24)
BUN/CREAT SERPL: 18 (ref 9–20)
CALCIUM SERPL-MCNC: 8.5 MG/DL (ref 8.7–10.2)
CASTS URNS QL MICRO: NORMAL /LPF
CHLORIDE SERPL-SCNC: 97 MMOL/L (ref 96–106)
CO2 SERPL-SCNC: 19 MMOL/L (ref 20–29)
COLOR UR: YELLOW
CREAT SERPL-MCNC: 1.11 MG/DL (ref 0.76–1.27)
EGFR: 77 ML/MIN/1.73
EOSINOPHIL # BLD AUTO: 0 X10E3/UL (ref 0–0.4)
EOSINOPHIL NFR BLD AUTO: 0 %
EPI CELLS #/AREA URNS HPF: NORMAL /HPF (ref 0–10)
ERYTHROCYTE [DISTWIDTH] IN BLOOD BY AUTOMATED COUNT: 16.6 % (ref 11.6–15.4)
GLOBULIN SER-MCNC: 3.1 G/DL (ref 1.5–4.5)
GLUCOSE SERPL-MCNC: 199 MG/DL (ref 65–99)
GLUCOSE UR QL: NEGATIVE
HCT VFR BLD AUTO: 27.1 % (ref 37.5–51)
HGB BLD-MCNC: 8.2 G/DL (ref 13–17.7)
HGB UR QL STRIP: NEGATIVE
IMM GRANULOCYTES # BLD: 0.2 X10E3/UL (ref 0–0.1)
IMM GRANULOCYTES NFR BLD: 1 %
KETONES UR QL STRIP: NEGATIVE
LEUKOCYTE ESTERASE UR QL STRIP: NEGATIVE
LYMPHOCYTES # BLD AUTO: 2.2 X10E3/UL (ref 0.7–3.1)
LYMPHOCYTES NFR BLD AUTO: 12 %
MCH RBC QN AUTO: 22.4 PG (ref 26.6–33)
MCHC RBC AUTO-ENTMCNC: 30.3 G/DL (ref 31.5–35.7)
MCV RBC AUTO: 74 FL (ref 79–97)
MICRO URNS: ABNORMAL
MONOCYTES # BLD AUTO: 1.9 X10E3/UL (ref 0.1–0.9)
MONOCYTES NFR BLD AUTO: 10 %
NEUTROPHILS # BLD AUTO: 13.6 X10E3/UL (ref 1.4–7)
NEUTROPHILS NFR BLD AUTO: 77 %
NITRITE UR QL STRIP: NEGATIVE
PH UR STRIP: 5.5 [PH] (ref 5–7.5)
PLATELET # BLD AUTO: 736 X10E3/UL (ref 150–450)
POTASSIUM SERPL-SCNC: 4.3 MMOL/L (ref 3.5–5.2)
PROT SERPL-MCNC: 5.7 G/DL (ref 6–8.5)
PROT UR QL STRIP: ABNORMAL
RBC # BLD AUTO: 3.66 X10E6/UL (ref 4.14–5.8)
RBC #/AREA URNS HPF: NORMAL /HPF (ref 0–2)
SODIUM SERPL-SCNC: 134 MMOL/L (ref 134–144)
SP GR UR: 1.02 (ref 1–1.03)
UROBILINOGEN UR STRIP-ACNC: 0.2 MG/DL (ref 0.2–1)
WBC # BLD AUTO: 17.8 X10E3/UL (ref 3.4–10.8)
WBC #/AREA URNS HPF: NORMAL /HPF (ref 0–5)

## 2022-03-10 PROCEDURE — C1788 PORT, INDWELLING, IMP: HCPCS

## 2022-03-10 PROCEDURE — 76937 US GUIDE VASCULAR ACCESS: CPT | Performed by: RADIOLOGY

## 2022-03-10 PROCEDURE — 99153 MOD SED SAME PHYS/QHP EA: CPT

## 2022-03-10 PROCEDURE — 36561 INSERT TUNNELED CV CATH: CPT

## 2022-03-10 PROCEDURE — 36561 INSERT TUNNELED CV CATH: CPT | Performed by: RADIOLOGY

## 2022-03-10 PROCEDURE — 77001 FLUOROGUIDE FOR VEIN DEVICE: CPT | Performed by: RADIOLOGY

## 2022-03-10 PROCEDURE — 99152 MOD SED SAME PHYS/QHP 5/>YRS: CPT

## 2022-03-10 PROCEDURE — 99152 MOD SED SAME PHYS/QHP 5/>YRS: CPT | Performed by: RADIOLOGY

## 2022-03-10 PROCEDURE — 77001 FLUOROGUIDE FOR VEIN DEVICE: CPT

## 2022-03-10 PROCEDURE — 76937 US GUIDE VASCULAR ACCESS: CPT

## 2022-03-10 RX ORDER — CEFAZOLIN SODIUM 2 G/50ML
SOLUTION INTRAVENOUS
Status: COMPLETED
Start: 2022-03-10 | End: 2022-03-10

## 2022-03-10 RX ORDER — CEFAZOLIN SODIUM 2 G/50ML
2000 SOLUTION INTRAVENOUS ONCE
Status: COMPLETED | OUTPATIENT
Start: 2022-03-10 | End: 2022-03-10

## 2022-03-10 RX ORDER — LIDOCAINE WITH 8.4% SOD BICARB 0.9%(10ML)
SYRINGE (ML) INJECTION CODE/TRAUMA/SEDATION MEDICATION
Status: COMPLETED | OUTPATIENT
Start: 2022-03-10 | End: 2022-03-10

## 2022-03-10 RX ORDER — MIDAZOLAM HYDROCHLORIDE 2 MG/2ML
INJECTION, SOLUTION INTRAMUSCULAR; INTRAVENOUS CODE/TRAUMA/SEDATION MEDICATION
Status: COMPLETED | OUTPATIENT
Start: 2022-03-10 | End: 2022-03-10

## 2022-03-10 RX ORDER — LIDOCAINE HYDROCHLORIDE AND EPINEPHRINE 10; 10 MG/ML; UG/ML
INJECTION, SOLUTION INFILTRATION; PERINEURAL CODE/TRAUMA/SEDATION MEDICATION
Status: COMPLETED | OUTPATIENT
Start: 2022-03-10 | End: 2022-03-10

## 2022-03-10 RX ORDER — SODIUM CHLORIDE 9 MG/ML
75 INJECTION, SOLUTION INTRAVENOUS CONTINUOUS
Status: DISCONTINUED | OUTPATIENT
Start: 2022-03-10 | End: 2022-03-14 | Stop reason: HOSPADM

## 2022-03-10 RX ORDER — GLYBURIDE-METFORMIN HYDROCHLORIDE 5; 500 MG/1; MG/1
1 TABLET ORAL
COMMUNITY

## 2022-03-10 RX ORDER — FENTANYL CITRATE 50 UG/ML
INJECTION, SOLUTION INTRAMUSCULAR; INTRAVENOUS CODE/TRAUMA/SEDATION MEDICATION
Status: COMPLETED | OUTPATIENT
Start: 2022-03-10 | End: 2022-03-10

## 2022-03-10 RX ADMIN — LIDOCAINE HYDROCHLORIDE,EPINEPHRINE BITARTRATE 10 ML: 10; .01 INJECTION, SOLUTION INFILTRATION; PERINEURAL at 10:42

## 2022-03-10 RX ADMIN — FENTANYL CITRATE 50 MCG: 50 INJECTION, SOLUTION INTRAMUSCULAR; INTRAVENOUS at 10:36

## 2022-03-10 RX ADMIN — FENTANYL CITRATE 50 MCG: 50 INJECTION, SOLUTION INTRAMUSCULAR; INTRAVENOUS at 10:41

## 2022-03-10 RX ADMIN — CEFAZOLIN SODIUM 2000 MG: 2 SOLUTION INTRAVENOUS at 10:07

## 2022-03-10 RX ADMIN — MIDAZOLAM HYDROCHLORIDE 1 MG: 1 INJECTION, SOLUTION INTRAMUSCULAR; INTRAVENOUS at 10:35

## 2022-03-10 RX ADMIN — MIDAZOLAM HYDROCHLORIDE 1 MG: 1 INJECTION, SOLUTION INTRAMUSCULAR; INTRAVENOUS at 10:30

## 2022-03-10 RX ADMIN — Medication 10 ML: at 10:41

## 2022-03-10 RX ADMIN — FENTANYL CITRATE 50 MCG: 50 INJECTION, SOLUTION INTRAMUSCULAR; INTRAVENOUS at 10:30

## 2022-03-10 RX ADMIN — MIDAZOLAM HYDROCHLORIDE 1 MG: 1 INJECTION, SOLUTION INTRAMUSCULAR; INTRAVENOUS at 10:41

## 2022-03-10 RX ADMIN — SODIUM CHLORIDE 75 ML/HR: 9 INJECTION, SOLUTION INTRAVENOUS at 10:07

## 2022-03-10 NOTE — PROCEDURES
Interventional Radiology Procedure Note    PATIENT NAME: Neeraj Hair  : 1965  MRN: [id number]     Pre-op Diagnosis:   1  Iron deficiency anemia due to chronic blood loss    2  Malignant neoplasm of transverse colon ([address] )      2  Port placement    Post-op Diagnosis:   1  Iron deficiency anemia due to chronic blood loss    2  Malignant neoplasm of transverse colon ([address] )      2  Same    Procedure: Port placement using sonographic and fluoroscopic guidance    Surgeon:   Darnell Pak MD  Assistants:     No qualified resident was available, Resident is only observing    Estimated Blood Loss: < 5cc    Findings: Tip location high right atrium   Ready to use    Specimens: None     Complications:  None     Anesthesia: Local and monitored intravenous conscious sedation      Darnell Pak MD     Date: 3/10/2022  Time: 11:18 AM

## 2022-03-10 NOTE — DISCHARGE INSTRUCTIONS
[address]  Interventional Radiology  [phone]         Implanted Venous Access Port     WHAT YOU NEED TO KNOW:   An implanted venous access port is a device used to give treatments and take blood  It may also be called a central venous access device (CVAD)  The port is a small container that is placed under your skin, usually in your upper chest  The port is attached to a catheter that enters a large vein  DISCHARGE INSTRUCTIONS:   Resume your normal diet  Small sips of flat soda will help with mild nausea  Prevent an infection:   · Wash your hands often  Use soap and water  Clean your hands before and after you care for your port  Remind everyone who cares for your port to wash their hands  · Check your skin for infection every day  Look for redness, swelling, or fluid oozing from the port site  Care for your port:   1  You may shower beginning 48 hours after procedure  2  Change dressing if it becomes wet  3  Remove dressing after 24 hours  Leave glue in place  4  It is normal for some bruising to occur  5  Use Tylenol for pain  6  Limit use of arm on the side that your port was placed  Lift nothing heavier than 5 pounds for 1 week, and then gradually increase activity as tolerated  7  DO NOT apply ointment, lotion or cream to port site until incision is healed  Allow glue to fall off  DO NOT attempt to peel glue from skin even it it begins to flake  8, After the port incision is healed you may swim, bathe  Notify the Interventional Radiologist if you have any of the followin  Fever above 101 F    2  Increased redness or swelling after 1st day  3  Increased pain after 1st day  4  Any sign of infection (drainage from port site, skin separation, hot to touch)  5  Persistent nausea or vomiting      Contact Interventional Radiology at [phone] Tabitha PATIENTS: Contact Interventional Radiology at [phone]) Tabitha Wilkinson PATIENTS: Contact Interventional Radiology at [phone])

## 2022-03-10 NOTE — PROGRESS NOTES
Interventional Radiology  History and Physical 3/10/2022     Demetrio Zamora   1965   [id number]    Assessment/Plan:  Plan ultrasound guidance, right internal jugular access, right chest wall port placement  Intravenous conscious sedation with Versed, fentanyl, and continuous modeling of pulse, heart rate, blood pressure, and oxygenation  Problem List Items Addressed This Visit        Digestive    Malignant neoplasm of transverse colon ([facility] )    Relevant Orders    IR port placement       Other    Iron deficiency anemia, unspecified    Relevant Orders    IR port placement             Subjective:     Patient ID: Demetrio Zamora is a 61 y o  male  History of Present Illness  Metastatic colon rectal cancer  Review of Systems      Past Medical History:   Diagnosis Date    Acute renal failure ([place] [address] )     [id number] resolved    Diabetes mellitus ([place] [address] )     Enteritis 08/23/2016    Gastroparesis due to DM ([place] [address] ) 08/23/2016    GERD (gastroesophageal reflux disease)     Hernia, ventral 08/04/2016    Hyperlipidemia     Hypertension         Past Surgical History:   Procedure Laterality Date    COLOSTOMY N/A 2/20/2022    Procedure: COLOSTOMY LOOP, diverting;  Surgeon: Mia Vivas MD;  Location: [facility];  Service: General    ESOPHAGOGASTRODUODENOSCOPY N/A 8/24/2016    Procedure: ESOPHAGOGASTRODUODENOSCOPY (EGD); Surgeon: Noé Womack MD;  [facility];   Service:     KIDNEY STONE SURGERY      LIVER BIOPSY LAPAROSCOPIC N/A 2/20/2022    Procedure: DIAGNOSTIC LAPAROSCOPIC LIVER BIOPSY, DIVERTING LOOP COLOSTOMY ;  Surgeon: Mia Vivas MD;  [facility];  Service: General    TONSILLECTOMY      UMBILICAL HERNIA REPAIR      UMBILICAL HERNIA REPAIR LAPAROSCOPIC N/A 4/26/2019    Procedure: LAPAROSCOPIC UMBILICAL HERNIA REPAIR;  Surgeon: Mia Vivas MD;  [facility];  Service: General        Social History     Tobacco Use   Smoking Status Never Smoker   Smokeless Tobacco Never Used        Social History     Substance and Sexual Activity   Alcohol Use Yes    Comment: occasion        Social History     Substance and Sexual Activity   Drug Use No        Allergies   Allergen Reactions    Shellfish-Derived Products - Food Allergy Anaphylaxis    Erythromycin GI Intolerance     Pt denies       Current Outpatient Medications   Medication Sig Dispense Refill    amLODIPine (NORVASC) 5 mg tablet TAKE 1 TABLET BY MOUTH TWICE A  tablet 0    aspirin 81 MG tablet Take 81 mg by mouth daily        atorvastatin (LIPITOR) 40 mg tablet Take 1 tablet (40 mg total) by mouth daily 90 tablet 3    Cholecalciferol (VITAMIN D3 PO) Take 400 Units by mouth daily      glyBURIDE-metFORMIN (GLUCOVANCE) 5-500 MG per tablet Take 1 tablet by mouth daily with breakfast      insulin glargine (Lantus) 100 units/mL subcutaneous injection Inject 10 Units under the skin daily at bedtime 10 mL 0    lisinopril (ZESTRIL) 40 mg tablet Take 1 tablet (40 mg total) by mouth daily 90 tablet 3    metoclopramide (REGLAN) 10 mg tablet TAKE 1 TABLET BY MOUTH TWICE A  tablet 0    Multiple Vitamins-Minerals (multivitamin with minerals) tablet Take 1 tablet by mouth daily      pantoprazole (PROTONIX) 40 mg tablet Take 1 tablet (40 mg total) by mouth 2 (two) times a day 180 tablet 3    sitaGLIPtin (JANUVIA) 100 mg tablet Take 1 tablet (100 mg total) by mouth daily 90 tablet 3    B-D UF III MINI PEN NEEDLES 31G X 5 MM MISC BY DOES NOT APPLY ROUTE 4 (FOUR) TIMES A  each 3    Continuous Blood Gluc Sensor (FreeStyle [place] 14 Day Sensor) MISC Use 1 each every 14 (fourteen) days 2 each 6    [START ON 3/14/2022] fluorouracil 5,425 mg in CADD infusion pump Infuse 5,425 mg (1,200 mg/m2/day x 2 26 m2) into a venous catheter over 46 hours for 2 days  Do not start before March 14, 2022  1 each 0    Needle, Disp, (HYPODERMIC NEEDLE) 23G X 1-1/2" MISC by Does not apply route once a week 10 each 6    ondansetron (ZOFRAN) 4 mg tablet Take 4 mg by mouth every 6 (six) hours as needed      Ostomy Supplies MISC Use in the morning 100 each 3     Current Facility-Administered Medications   Medication Dose Route Frequency Provider Last Rate Last Admin    ceFAZolin (ANCEF) IVPB (premix in dextrose) 2,000 mg 50 mL  2,000 mg Intravenous Once Nahomi Rivera  mL/hr at 03/10/22 1007 2,000 mg at 03/10/22 1007    sodium chloride 0 9 % infusion  75 mL/hr Intravenous Continuous Nahomi Rivera MD 75 mL/hr at 03/10/22 1007 75 mL/hr at 03/10/22 1007          Objective:    Vitals:    03/10/22 0939 03/10/22 1025 03/10/22 1030   BP: 134/67 (!) 171/85 168/86   Pulse: 94 85 81   Resp: 18 18 18   Temp: 97 6 °F (36 4 °C)     TempSrc: Temporal     SpO2: 99% 98% 99%   Weight: 108 kg (239 lb)     Height: 5' 10" (1 778 m)          Physical Exam      He is nervous  Regular rate and rhythm  The skin over the right chest is clean, dry, and intact  No visible collateral veins or pacemaker  No results found for: BNP   Lab Results   Component Value Date    WBC 17 8 (H) 03/09/2022    HGB 8 2 (L) 03/09/2022    HCT 27 1 (L) 03/09/2022    MCV 74 (L) 03/09/2022     (H) 03/09/2022     Lab Results   Component Value Date    INR 1 18 02/23/2022    PROTIME 14 6 (H) 02/23/2022     Lab Results   Component Value Date    PTT 39 (H) 02/23/2022         I have personally reviewed pertinent imaging and laboratory results  Code Status: Prior  Advance Directive and Living Will:      Power of :    POLST:      This text is generated with voice recognition software  There may be translation, syntax,  or grammatical errors  If you have any questions, please contact the dictating provider

## 2022-03-11 ENCOUNTER — TELEPHONE (OUTPATIENT)
Dept: HEMATOLOGY ONCOLOGY | Facility: CLINIC | Age: 57
End: 2022-03-11

## 2022-03-11 DIAGNOSIS — K21.9 GASTROESOPHAGEAL REFLUX DISEASE WITHOUT ESOPHAGITIS: ICD-10-CM

## 2022-03-11 DIAGNOSIS — E78.2 MIXED HYPERLIPIDEMIA: ICD-10-CM

## 2022-03-11 DIAGNOSIS — E11.9 TYPE 2 DIABETES MELLITUS WITHOUT COMPLICATION, WITHOUT LONG-TERM CURRENT USE OF INSULIN (HCC): ICD-10-CM

## 2022-03-11 DIAGNOSIS — I10 ESSENTIAL HYPERTENSION: ICD-10-CM

## 2022-03-11 PROCEDURE — 4010F ACE/ARB THERAPY RXD/TAKEN: CPT | Performed by: INTERNAL MEDICINE

## 2022-03-11 RX ORDER — PANTOPRAZOLE SODIUM 40 MG/1
TABLET, DELAYED RELEASE ORAL
Qty: 180 TABLET | Refills: 3 | Status: SHIPPED | OUTPATIENT
Start: 2022-03-11 | End: 2022-05-08

## 2022-03-11 RX ORDER — LISINOPRIL 40 MG/1
TABLET ORAL
Qty: 90 TABLET | Refills: 3 | Status: SHIPPED | OUTPATIENT
Start: 2022-03-11

## 2022-03-11 RX ORDER — ATORVASTATIN CALCIUM 40 MG/1
TABLET, FILM COATED ORAL
Qty: 90 TABLET | Refills: 3 | Status: SHIPPED | OUTPATIENT
Start: 2022-03-11 | End: 2022-06-30

## 2022-03-11 RX ORDER — FLASH GLUCOSE SENSOR
1 KIT MISCELLANEOUS
Qty: 2 EACH | Refills: 6 | Status: SHIPPED | OUTPATIENT
Start: 2022-03-11

## 2022-03-11 RX ORDER — SITAGLIPTIN 100 MG/1
TABLET, FILM COATED ORAL
Qty: 90 TABLET | Refills: 3 | Status: SHIPPED | OUTPATIENT
Start: 2022-03-11

## 2022-03-11 RX ORDER — METOCLOPRAMIDE 10 MG/1
10 TABLET ORAL 2 TIMES DAILY
Qty: 180 TABLET | Refills: 0 | Status: SHIPPED | OUTPATIENT
Start: 2022-03-11 | End: 2022-06-09

## 2022-03-11 NOTE — TELEPHONE ENCOUNTER
Medication time out/change to orders    Date patient scheduled: 3/14    Original medication ordered: Feraheme    New Medication ordered: Venofer 300 mg weekly x4 (insurance preference)      Discussed with Juliann Carter at [address]  Patient scheduled for chemo and venofer on 3/14

## 2022-03-12 ENCOUNTER — APPOINTMENT (OUTPATIENT)
Dept: RADIOLOGY | Facility: HOSPITAL | Age: 57
End: 2022-03-12
Payer: COMMERCIAL

## 2022-03-12 ENCOUNTER — APPOINTMENT (EMERGENCY)
Dept: CT IMAGING | Facility: HOSPITAL | Age: 57
End: 2022-03-12
Payer: COMMERCIAL

## 2022-03-12 ENCOUNTER — HOSPITAL ENCOUNTER (OUTPATIENT)
Facility: HOSPITAL | Age: 57
Setting detail: OBSERVATION
Discharge: HOME HEALTH | End: 2022-03-14
Attending: EMERGENCY MEDICINE | Admitting: STUDENT IN AN ORGANIZED HEALTH CARE EDUCATION/TRAINING PROGRAM
Payer: COMMERCIAL

## 2022-03-12 DIAGNOSIS — R50.9 FEVER: ICD-10-CM

## 2022-03-12 DIAGNOSIS — C18.9 COLON CANCER METASTASIZED TO LIVER (HCC): ICD-10-CM

## 2022-03-12 DIAGNOSIS — R10.9 ABDOMINAL PAIN: Primary | ICD-10-CM

## 2022-03-12 DIAGNOSIS — C78.7 COLON CANCER METASTASIZED TO LIVER (HCC): ICD-10-CM

## 2022-03-12 PROBLEM — R65.10 SIRS (SYSTEMIC INFLAMMATORY RESPONSE SYNDROME) (HCC): Status: ACTIVE | Noted: 2022-03-12

## 2022-03-12 LAB
ALBUMIN SERPL BCP-MCNC: 1.9 G/DL (ref 3.5–5)
ALP SERPL-CCNC: 575 U/L (ref 46–116)
ALT SERPL W P-5'-P-CCNC: 35 U/L (ref 12–78)
ANION GAP SERPL CALCULATED.3IONS-SCNC: 13 MMOL/L (ref 4–13)
AST SERPL W P-5'-P-CCNC: 118 U/L (ref 5–45)
BASOPHILS # BLD AUTO: 0.07 THOUSANDS/ΜL (ref 0–0.1)
BASOPHILS NFR BLD AUTO: 0 % (ref 0–1)
BILIRUB SERPL-MCNC: 1.14 MG/DL (ref 0.2–1)
BILIRUB UR QL STRIP: NEGATIVE
BUN SERPL-MCNC: 14 MG/DL (ref 5–25)
CALCIUM ALBUM COR SERPL-MCNC: 10.2 MG/DL (ref 8.3–10.1)
CALCIUM SERPL-MCNC: 8.5 MG/DL (ref 8.3–10.1)
CHLORIDE SERPL-SCNC: 97 MMOL/L (ref 100–108)
CLARITY UR: CLEAR
CO2 SERPL-SCNC: 23 MMOL/L (ref 21–32)
COLOR UR: YELLOW
CREAT SERPL-MCNC: 1.38 MG/DL (ref 0.6–1.3)
EOSINOPHIL # BLD AUTO: 0 THOUSAND/ΜL (ref 0–0.61)
EOSINOPHIL NFR BLD AUTO: 0 % (ref 0–6)
ERYTHROCYTE [DISTWIDTH] IN BLOOD BY AUTOMATED COUNT: 18.6 % (ref 11.6–15.1)
FLUAV RNA RESP QL NAA+PROBE: NEGATIVE
FLUBV RNA RESP QL NAA+PROBE: NEGATIVE
GFR SERPL CREATININE-BSD FRML MDRD: 56 ML/MIN/1.73SQ M
GLUCOSE SERPL-MCNC: 185 MG/DL (ref 65–140)
GLUCOSE SERPL-MCNC: 239 MG/DL (ref 65–140)
GLUCOSE SERPL-MCNC: 269 MG/DL (ref 65–140)
GLUCOSE SERPL-MCNC: 300 MG/DL (ref 65–140)
GLUCOSE SERPL-MCNC: 377 MG/DL (ref 65–140)
GLUCOSE UR STRIP-MCNC: NEGATIVE MG/DL
HCT VFR BLD AUTO: 28.6 % (ref 36.5–49.3)
HGB BLD-MCNC: 9.1 G/DL (ref 12–17)
HGB UR QL STRIP.AUTO: NEGATIVE
IMM GRANULOCYTES # BLD AUTO: 0.31 THOUSAND/UL (ref 0–0.2)
IMM GRANULOCYTES NFR BLD AUTO: 1 % (ref 0–2)
KETONES UR STRIP-MCNC: NEGATIVE MG/DL
LACTATE SERPL-SCNC: 1.1 MMOL/L (ref 0.5–2)
LACTATE SERPL-SCNC: 2.8 MMOL/L (ref 0.5–2)
LEUKOCYTE ESTERASE UR QL STRIP: NEGATIVE
LIPASE SERPL-CCNC: 216 U/L (ref 73–393)
LYMPHOCYTES # BLD AUTO: 1.5 THOUSANDS/ΜL (ref 0.6–4.47)
LYMPHOCYTES NFR BLD AUTO: 7 % (ref 14–44)
MCH RBC QN AUTO: 23.5 PG (ref 26.8–34.3)
MCHC RBC AUTO-ENTMCNC: 31.8 G/DL (ref 31.4–37.4)
MCV RBC AUTO: 74 FL (ref 82–98)
MONOCYTES # BLD AUTO: 2.5 THOUSAND/ΜL (ref 0.17–1.22)
MONOCYTES NFR BLD AUTO: 11 % (ref 4–12)
NEUTROPHILS # BLD AUTO: 17.79 THOUSANDS/ΜL (ref 1.85–7.62)
NEUTS SEG NFR BLD AUTO: 81 % (ref 43–75)
NITRITE UR QL STRIP: NEGATIVE
NRBC BLD AUTO-RTO: 0 /100 WBCS
PH UR STRIP.AUTO: 6 [PH]
PLATELET # BLD AUTO: 800 THOUSANDS/UL (ref 149–390)
PLATELET # BLD AUTO: 802 THOUSANDS/UL (ref 149–390)
PMV BLD AUTO: 10.1 FL (ref 8.9–12.7)
PMV BLD AUTO: 10.9 FL (ref 8.9–12.7)
POTASSIUM SERPL-SCNC: 3.8 MMOL/L (ref 3.5–5.3)
PROCALCITONIN SERPL-MCNC: 1.15 NG/ML
PROT SERPL-MCNC: 7 G/DL (ref 6.4–8.2)
PROT UR STRIP-MCNC: NEGATIVE MG/DL
RBC # BLD AUTO: 3.88 MILLION/UL (ref 3.88–5.62)
RSV RNA RESP QL NAA+PROBE: NEGATIVE
SARS-COV-2 RNA RESP QL NAA+PROBE: NEGATIVE
SODIUM SERPL-SCNC: 133 MMOL/L (ref 136–145)
SP GR UR STRIP.AUTO: <=1.005 (ref 1–1.03)
UROBILINOGEN UR QL STRIP.AUTO: 0.2 E.U./DL
WBC # BLD AUTO: 22.17 THOUSAND/UL (ref 4.31–10.16)

## 2022-03-12 PROCEDURE — 71046 X-RAY EXAM CHEST 2 VIEWS: CPT

## 2022-03-12 PROCEDURE — 99283 EMERGENCY DEPT VISIT LOW MDM: CPT | Performed by: SURGERY

## 2022-03-12 PROCEDURE — 99223 1ST HOSP IP/OBS HIGH 75: CPT | Performed by: FAMILY MEDICINE

## 2022-03-12 PROCEDURE — 85049 AUTOMATED PLATELET COUNT: CPT | Performed by: FAMILY MEDICINE

## 2022-03-12 PROCEDURE — 99285 EMERGENCY DEPT VISIT HI MDM: CPT

## 2022-03-12 PROCEDURE — 84145 PROCALCITONIN (PCT): CPT | Performed by: FAMILY MEDICINE

## 2022-03-12 PROCEDURE — 85025 COMPLETE CBC W/AUTO DIFF WBC: CPT | Performed by: EMERGENCY MEDICINE

## 2022-03-12 PROCEDURE — 0241U HB NFCT DS VIR RESP RNA 4 TRGT: CPT | Performed by: EMERGENCY MEDICINE

## 2022-03-12 PROCEDURE — 36415 COLL VENOUS BLD VENIPUNCTURE: CPT | Performed by: EMERGENCY MEDICINE

## 2022-03-12 PROCEDURE — 74177 CT ABD & PELVIS W/CONTRAST: CPT

## 2022-03-12 PROCEDURE — 82948 REAGENT STRIP/BLOOD GLUCOSE: CPT

## 2022-03-12 PROCEDURE — 80053 COMPREHEN METABOLIC PANEL: CPT | Performed by: EMERGENCY MEDICINE

## 2022-03-12 PROCEDURE — 81003 URINALYSIS AUTO W/O SCOPE: CPT | Performed by: EMERGENCY MEDICINE

## 2022-03-12 PROCEDURE — 96361 HYDRATE IV INFUSION ADD-ON: CPT

## 2022-03-12 PROCEDURE — 83605 ASSAY OF LACTIC ACID: CPT | Performed by: EMERGENCY MEDICINE

## 2022-03-12 PROCEDURE — 87040 BLOOD CULTURE FOR BACTERIA: CPT | Performed by: EMERGENCY MEDICINE

## 2022-03-12 PROCEDURE — 83690 ASSAY OF LIPASE: CPT | Performed by: EMERGENCY MEDICINE

## 2022-03-12 PROCEDURE — 99285 EMERGENCY DEPT VISIT HI MDM: CPT | Performed by: EMERGENCY MEDICINE

## 2022-03-12 PROCEDURE — 96365 THER/PROPH/DIAG IV INF INIT: CPT

## 2022-03-12 RX ORDER — SODIUM CHLORIDE, SODIUM GLUCONATE, SODIUM ACETATE, POTASSIUM CHLORIDE, MAGNESIUM CHLORIDE, SODIUM PHOSPHATE, DIBASIC, AND POTASSIUM PHOSPHATE .53; .5; .37; .037; .03; .012; .00082 G/100ML; G/100ML; G/100ML; G/100ML; G/100ML; G/100ML; G/100ML
100 INJECTION, SOLUTION INTRAVENOUS CONTINUOUS
Status: DISCONTINUED | OUTPATIENT
Start: 2022-03-12 | End: 2022-03-13

## 2022-03-12 RX ORDER — ASPIRIN 81 MG/1
81 TABLET ORAL DAILY
Status: DISCONTINUED | OUTPATIENT
Start: 2022-03-12 | End: 2022-03-14 | Stop reason: HOSPADM

## 2022-03-12 RX ORDER — INSULIN GLARGINE 100 [IU]/ML
10 INJECTION, SOLUTION SUBCUTANEOUS
Status: DISCONTINUED | OUTPATIENT
Start: 2022-03-12 | End: 2022-03-14 | Stop reason: HOSPADM

## 2022-03-12 RX ORDER — ATORVASTATIN CALCIUM 40 MG/1
40 TABLET, FILM COATED ORAL DAILY
Status: DISCONTINUED | OUTPATIENT
Start: 2022-03-12 | End: 2022-03-14 | Stop reason: HOSPADM

## 2022-03-12 RX ORDER — METOCLOPRAMIDE 10 MG/1
10 TABLET ORAL 2 TIMES DAILY
Status: DISCONTINUED | OUTPATIENT
Start: 2022-03-12 | End: 2022-03-14 | Stop reason: HOSPADM

## 2022-03-12 RX ORDER — AMLODIPINE BESYLATE 5 MG/1
5 TABLET ORAL 2 TIMES DAILY
Status: DISCONTINUED | OUTPATIENT
Start: 2022-03-12 | End: 2022-03-14 | Stop reason: HOSPADM

## 2022-03-12 RX ORDER — HEPARIN SODIUM 5000 [USP'U]/ML
5000 INJECTION, SOLUTION INTRAVENOUS; SUBCUTANEOUS EVERY 8 HOURS SCHEDULED
Status: DISCONTINUED | OUTPATIENT
Start: 2022-03-12 | End: 2022-03-14 | Stop reason: HOSPADM

## 2022-03-12 RX ORDER — PANTOPRAZOLE SODIUM 40 MG/1
40 TABLET, DELAYED RELEASE ORAL 2 TIMES DAILY
Status: DISCONTINUED | OUTPATIENT
Start: 2022-03-12 | End: 2022-03-14 | Stop reason: HOSPADM

## 2022-03-12 RX ORDER — ACETAMINOPHEN 325 MG/1
975 TABLET ORAL ONCE
Status: COMPLETED | OUTPATIENT
Start: 2022-03-12 | End: 2022-03-12

## 2022-03-12 RX ADMIN — SODIUM CHLORIDE, SODIUM GLUCONATE, SODIUM ACETATE, POTASSIUM CHLORIDE, MAGNESIUM CHLORIDE, SODIUM PHOSPHATE, DIBASIC, AND POTASSIUM PHOSPHATE 100 ML/HR: .53; .5; .37; .037; .03; .012; .00082 INJECTION, SOLUTION INTRAVENOUS at 09:15

## 2022-03-12 RX ADMIN — METOCLOPRAMIDE 10 MG: 10 TABLET ORAL at 09:15

## 2022-03-12 RX ADMIN — HEPARIN SODIUM 5000 UNITS: 5000 INJECTION INTRAVENOUS; SUBCUTANEOUS at 16:41

## 2022-03-12 RX ADMIN — ASPIRIN 81 MG: 81 TABLET, COATED ORAL at 09:15

## 2022-03-12 RX ADMIN — HEPARIN SODIUM 5000 UNITS: 5000 INJECTION INTRAVENOUS; SUBCUTANEOUS at 09:15

## 2022-03-12 RX ADMIN — PANTOPRAZOLE SODIUM 40 MG: 40 TABLET, DELAYED RELEASE ORAL at 17:53

## 2022-03-12 RX ADMIN — HEPARIN SODIUM 5000 UNITS: 5000 INJECTION INTRAVENOUS; SUBCUTANEOUS at 21:58

## 2022-03-12 RX ADMIN — AMLODIPINE BESYLATE 5 MG: 5 TABLET ORAL at 09:15

## 2022-03-12 RX ADMIN — SODIUM CHLORIDE 3.38 G: 900 INJECTION, SOLUTION INTRAVENOUS at 06:12

## 2022-03-12 RX ADMIN — IOHEXOL 100 ML: 350 INJECTION, SOLUTION INTRAVENOUS at 05:41

## 2022-03-12 RX ADMIN — INSULIN LISPRO 6 UNITS: 100 INJECTION, SOLUTION INTRAVENOUS; SUBCUTANEOUS at 09:29

## 2022-03-12 RX ADMIN — INSULIN LISPRO 4 UNITS: 100 INJECTION, SOLUTION INTRAVENOUS; SUBCUTANEOUS at 12:07

## 2022-03-12 RX ADMIN — INSULIN GLARGINE 10 UNITS: 100 INJECTION, SOLUTION SUBCUTANEOUS at 21:58

## 2022-03-12 RX ADMIN — INSULIN LISPRO 3 UNITS: 100 INJECTION, SOLUTION INTRAVENOUS; SUBCUTANEOUS at 16:41

## 2022-03-12 RX ADMIN — SODIUM CHLORIDE 1000 ML: 9 INJECTION, SOLUTION INTRAVENOUS at 06:33

## 2022-03-12 RX ADMIN — SODIUM CHLORIDE, SODIUM GLUCONATE, SODIUM ACETATE, POTASSIUM CHLORIDE, MAGNESIUM CHLORIDE, SODIUM PHOSPHATE, DIBASIC, AND POTASSIUM PHOSPHATE 100 ML/HR: .53; .5; .37; .037; .03; .012; .00082 INJECTION, SOLUTION INTRAVENOUS at 21:54

## 2022-03-12 RX ADMIN — METOCLOPRAMIDE 10 MG: 10 TABLET ORAL at 17:53

## 2022-03-12 RX ADMIN — SODIUM CHLORIDE 3.38 G: 900 INJECTION, SOLUTION INTRAVENOUS at 12:07

## 2022-03-12 RX ADMIN — AMLODIPINE BESYLATE 5 MG: 5 TABLET ORAL at 17:53

## 2022-03-12 RX ADMIN — ACETAMINOPHEN 975 MG: 325 TABLET ORAL at 04:58

## 2022-03-12 RX ADMIN — ATORVASTATIN CALCIUM 40 MG: 40 TABLET, FILM COATED ORAL at 09:15

## 2022-03-12 RX ADMIN — SODIUM CHLORIDE 3.38 G: 900 INJECTION, SOLUTION INTRAVENOUS at 17:53

## 2022-03-12 RX ADMIN — PANTOPRAZOLE SODIUM 40 MG: 40 TABLET, DELAYED RELEASE ORAL at 09:15

## 2022-03-12 RX ADMIN — SODIUM CHLORIDE 1000 ML: 0.9 INJECTION, SOLUTION INTRAVENOUS at 04:54

## 2022-03-12 NOTE — PLAN OF CARE
Problem: Potential for Falls  Goal: Patient will remain free of falls  Description: INTERVENTIONS:  - Educate patient/family on patient safety including physical limitations  - Instruct patient to call for assistance with activity   - Consult OT/PT to assist with strengthening/mobility   - Keep Call bell within reach  - Keep bed low and locked with side rails adjusted as appropriate  - Keep care items and personal belongings within reach  - Initiate and maintain comfort rounds  - Make Fall Risk Sign visible to staff  - Offer Toileting every 2 Hours, in advance of need  - Initiate/Maintain bed alarm  - Apply yellow socks and bracelet for high fall risk patients  - Consider moving patient to room near nurses station  Outcome: Progressing     Problem: Nutrition/Hydration-ADULT  Goal: Nutrient/Hydration intake appropriate for improving, restoring or maintaining nutritional needs  Description: Monitor and assess patient's nutrition/hydration status for malnutrition  Collaborate with interdisciplinary team and initiate plan and interventions as ordered  Monitor patient's weight and dietary intake as ordered or per policy  Utilize nutrition screening tool and intervene as necessary  Determine patient's food preferences and provide high-protein, high-caloric foods as appropriate       INTERVENTIONS:  - Monitor oral intake, urinary output, labs, and treatment plans  - Assess nutrition and hydration status and recommend course of action  - Evaluate amount of meals eaten   - Allow adequate time for meals  - Recommend/ encourage appropriate diets, oral nutritional supplements, and vitamin/mineral supplements  - Order, calculate, and assess calorie counts as needed  - Assess need for intravenous fluids  - Provide specific nutrition/hydration education as appropriate  - Include patient/family/caregiver in decisions related to nutrition  Outcome: Progressing

## 2022-03-12 NOTE — ASSESSMENT & PLAN NOTE
65-year-old male with underlying history of metastatic malignant neoplasm transverse colon status post recent colostomy in February 2022 who presented to the ED with symptoms of fevers/abdominal pain  · Meeting SIRS criteria on presentation due to fever/elevated white blood cell count/elevated lactate/tachycardia  · No clear source currently  · CT abdomen pelvis noted postoperative changes with no evidence of obstruction  Hepatic metastasis was noted  · Chest x-ray negative for evidence of pneumonia (official report pending)  · UA negative  · Flu RSV COVID-19 PCR negative  · Blood cultures x2 pending  · On IV Zosyn  · On IV fluids

## 2022-03-12 NOTE — CASE MANAGEMENT
Case Management Assessment & Discharge Planning Note    Patient name Sandy Sepulveda  Location /-70 MRN [id number]  : 1965 Date 3/12/2022       Current Admission Date: 3/12/2022  Current Admission Diagnosis:Abdominal pain   Patient Active Problem List    Diagnosis Date Noted    Colon cancer metastasized to liver ([facility] ) 2022    Abdominal pain 2022    SIRS (systemic inflammatory response syndrome) ([facility] ) 2022    Cancer of transverse colon ([facility] ) 2022    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 liver ([facility] ) 2022    Postoperative visit 2022    Iron deficiency anemia, unspecified 2022    Malignant neoplasm of transverse colon ([facility] ) 2022    Thrombocytosis 2022    Hypokalemia 2022    Transaminitis 2022    Stage 3a chronic kidney disease ([facility] ) 2022    Type 2 diabetes mellitus with hyperlipidemia ([place] [address] ) 2022    Colonic mass 2022    Microcytic anemia 2022    Left ureteral calculus 2020    Incarcerated umbilical hernia     Morbid obesity ([facility] ) 2018    Testicular hypogonadism 2017    Low testosterone 2017    Type 2 diabetes mellitus without complication, without long-term current use of insulin ([facility] ) 2016    Benign essential hypertension 2016    Mixed hyperlipidemia 2016    Erectile dysfunction 2016      LOS (days): 0  Geometric Mean LOS (GMLOS) (days):   Days to GMLOS:     OBJECTIVE:              Current admission status: Observation       Preferred Pharmacy:   [facility]/pharmacy #[id number] - [place], [place] - [address] [place] [address]  Phone: [phone] Fax: [phone]    [address] [place] [address] [address] [place] [address]  [facility] [address]  Phone: [phone] Fax: [phone]    [facility]/pharmacy #[id number]- [facility] [place]JELICA [place]BOS - [address] [facility] [address]  Phone: [phone] Fax: [phone]    Primary Care Provider: Chayo Bell MD    Primary Insurance: CIGINES  Secondary Insurance:     ASSESSMENT:  [address], [address] Representative - Spouse   Primary Phone: [phone] (Mobile)               Advance Directives  Does patient have a [address]?: No  Was patient offered paperwork?: Yes (yes, but pt states he already has the paperwork from a previous admission)  Does patient currently have a Health Care decision maker?: Yes, please see Health Care Proxy section (wife Paul Campoverde)  Does patient have Advance Directives?: No  Was patient offered paperwork?: Yes (has paperwork)  Primary Contact: wife Vika Pandya Notice Signed:  (not on workqueue)    Readmission Root Cause  30 Day Readmission: No    Patient Information  Admitted from[de-identified] Home  Mental Status: Alert  During Assessment patient was accompanied by: Not accompanied during assessment  Assessment information provided by[de-identified] Patient  Primary Caregiver: Self  Caregiver's Name[de-identified] Rosy Zimmer Relationship to Patient[de-identified] Family Member  Support Systems: Spouse/significant other  [place] of Residence: [place] [address] do you live in?: [address] entry access options   Select all that apply : Stairs  Number of steps to enter home : 2 (garage)  Do the steps have railings?: No  Type of Current Residence: [address] [facility] [address]  Upon entering residence, is there a bedroom on the main floor (no further steps)?: No  A bedroom is located on the following floor levels of residence (select all that apply):: 2nd Floor  Upon entering residence, is there a bathroom on the main floor (no further steps)?: Yes  Number of steps to 2nd floor from main floor: One Flight  In the last 12 months, was there a time when you were not able to pay the mortgage or rent on time?: No  In the last 12 months, how many places have you lived?: 1  In the last 12 months, was there a time when you did not have a steady place to sleep or slept in a shelter (including now)?: No  Homeless/housing insecurity resource given?: No  Living Arrangements: Lives w/ Spouse/significant other (lives with wife Himanshu Bravo and 2 children ages-16 and 13)  Is patient a ?: No    Activities of Daily Living Prior to Admission  Functional Status: Independent  Completes ADLs independently?: Yes  Ambulates independently?: Yes  Does patient use assisted devices?: No  Does patient currently own DME?: No  Does patient have a history of Outpatient Therapy (PT/OT)?: Yes (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 [employer]  Goes to OP/PT supplied by the [facility])  Does the patient have a history of Short-Term Rehab?: No  Does patient have a history of HHC?: Yes  Does patient currently have [address]?: Yes ([facility])    Current Home Health Care  Type of Current Home Care Services: Nurse visit  Current Home Health Agency[de-identified] [address] [place] [address] Provider[de-identified] PCP    Patient Information Continued  Income Source: Unemployed (unemployed at the present time due to health issues    Has applied for SSI)  Does patient have prescription coverage?: Yes  Within the past 12 months, the food you bought just didnt last and you didnt have money to get more : Never true  Food insecurity resource given?: No  Does patient receive dialysis treatments?: No  Does patient have a history of substance abuse?: No  Does patient have a history of Mental Health Diagnosis?: No    PHQ 2/9 Screening   Reviewed PHQ 2/9 Depression Screening Score?: No    Means of Transportation  Means of Transport to Appts[de-identified] Drives Self  In the past 12 months, has lack of transportation kept you from medical appointments or from getting medications?: No  In the past 12 months, has lack of transportation kept you from meetings, work, or from getting things needed for daily living?: No  Was application for public transport provided?: No        DISCHARGE DETAILS:    Discharge planning discussed with[de-identified] patient  Freedom of Choice: Yes  Comments - Freedom of Choice: NICKIE-[facility]  Pt will be starting chemo when infection is cleared [facility] [address]  CM contacted family/caregiver?: No- see comments (Pt does not feel that is necessary at this time )  Were Treatment Team discharge recommendations reviewed with patient/caregiver?: Yes  Did patient/caregiver verbalize understanding of patient care needs?: Yes  Were patient/caregiver advised of the risks associated with not following Treatment Team discharge recommendations?: Yes    Contacts  Patient Contacts: Vika  Relationship to Patient[de-identified] [address]         Is the patient interested in [facility] at discharge?: Yes  [address] requested[de-identified] [address] Name[de-identified] [address] Provider[de-identified] PCP  Home Health Services Needed[de-identified] Evaluate Functional Status and Safety,Wound/Ostomy Care  Homebound Criteria Met[de-identified] Requires the Assistance of Another Person for Safe Ambulation or to Leave the Home  Supporting Clincal Findings[de-identified] Limited Endurance    DME Referral Provided  Referral made for DME?: No    Other Referral/Resources/Interventions Provided:  Interventions: [facility]  Referral Comments: [facility]-still uncomfortable with colostomy  Pt to start chemo as soon as this infection is cleared  Already has port      Would you like to participate in our [address] service program?  : No - Declined    Treatment Team Recommendation[facility]  Discharge Destination Plan[de-identified] Home with Mildred at Discharge : Family

## 2022-03-12 NOTE — ASSESSMENT & PLAN NOTE
· History of malignant neoplasm transverse colon with hepatic metastasis  · Status post diverting colostomy infect 2022  · Port placement done on 03/10/2022  · Patient reports initiation of chemotherapy on 03/14/2022  · Follows with Dr Shelbie Figueroa from Oncology

## 2022-03-12 NOTE — H&P
[facility] 1965[facility] 61 [facility]  [facility] MRN: [id number]  Unit/Bed#: -01 Encounter: [id number]  Primary Care Provider: Boom Patel MD   Date and time admitted to hospital: 3/12/2022  4:27 AM    SIRS (systemic inflammatory response syndrome) [facility])  Assessment & Plan  34-year-old male with underlying history of metastatic malignant neoplasm transverse colon status post recent colostomy in February 2022 who presented to the ED with symptoms of fevers/abdominal pain  · Meeting SIRS criteria on presentation due to fever/elevated white blood cell count/elevated lactate/tachycardia  · No clear source currently  · CT abdomen pelvis noted postoperative changes with no evidence of obstruction  Hepatic metastasis was noted  · Chest x-ray negative for evidence of pneumonia (official report pending)  · UA negative  · Flu RSV COVID-19 PCR negative  · Blood cultures x2 pending  · On IV Zosyn  · On IV fluids  * Abdominal pain  Assessment & Plan  · Recent history of colostomy secondary to malignant neoplasm transverse colon  Patient reported eating some heavy food yesterday following which he noted since known no stool/gas or liquid via his ostomy  He woke up at 3:00 a m  With no ostomy output and significant abdominal discomfort  · CT abdomen pelvis notes evidence of postoperative changes from recent right mid abdomen diverting colostomy  No evidence of obstruction  Small amount of intra-abdominal/pelvic ascites  No free air  Hepatic metastasis noted  · Requested General surgery evaluation of colostomy site        Colon cancer metastasized to liver [facility])  Assessment & Plan  · History of malignant neoplasm transverse colon with hepatic metastasis  · Status post diverting colostomy infect 2022  · Port placement done on 03/10/2022  · Patient reports initiation of chemotherapy on 03/14/2022  · Follows with Dr Tia Morrison from Oncology    Type 2 diabetes mellitus without complication, without long-term current use of insulin [facility])  Assessment & Plan    Lab Results   Component Value Date    HGBA1C 7 5 (H) 02/10/2022     · Hold home oral agents  · humalog SSI ac and hs   · Lantus 10 units q h s  · Consistent carb diet  Stage 3a chronic kidney disease (HCC)  Assessment & Plan  Cr baseline fluctuates, ranging 1 2-1 5   · 1 38 on arrival  · On IV fluids  · Monitor BMP closely  Benign essential hypertension  Assessment & Plan  · BP in acceptable range  · continue home amlodipine   · Hold lisinopril for now  Thrombocytosis  Assessment & Plan  · Chronic, likely reactive   · Monitor       VTE Prophylaxis: Heparin  / sequential compression device   Code Status: level 1  POLST: POLST form is not discussed and not completed at this time  Anticipated Length of Stay:  Patient will be admitted on an Observation basis with an anticipated length of stay of  Less than 2 midnights  Justification for Hospital Stay: iv abx    Total Time for Visit, including Counseling / Coordination of Care: 45 minutes  Greater than 50% of this total time spent on direct patient counseling and coordination of care  Chief Complaint:   Fever/ abdominal pain/no ostomy output    History of Present Illness:    Sushant Tenorio is a 61 y o  male with history of metastatic malignant neoplasm transverse colon status post recent colostomy 3 weeks ago who presented to the ED with symptoms of fever abdominal pain and no ostomy output  Patient reports he woke up at his baseline state of health yesterday morning  He had some heavy food  Reportedly ate pizza following which he noted complete lack of output from his ostomy site  He reported no stool and no gas even  Eventually at 3:00 a m  He woke up because of significant abdominal discomfort and noted again there was no gas in the bag  He presented to the ED for evaluation of his colostomy site when he was also noted to have a fever    His white blood cell count was 48003 with lactate of 2 8  CT abdomen pelvis notes postoperative changes at right mid abdomen diverting colostomy with no evidence of obstruction  Hepatic metastasis  No evidence of infection  Chest x-ray negative  UA negative  Flu RSV COVID-19 PCR negative  Blood cultures sent  Patient meeting SIRS criteria on admission and started on IV Zosyn/IV fluids  General surgery consult requested for evaluation of ostomy site and abdominal pain  Review of Systems:    Review of Systems   Constitutional: Positive for appetite change and fever  Negative for activity change, chills, diaphoresis and fatigue  HENT: Negative for congestion, postnasal drip and rhinorrhea  Respiratory: Negative for cough, shortness of breath and wheezing  Cardiovascular: Negative for chest pain, palpitations and leg swelling  Gastrointestinal: Positive for abdominal pain  Negative for blood in stool, constipation, diarrhea, nausea and vomiting  Genitourinary: Negative for dysuria, flank pain, frequency and hematuria  Musculoskeletal: Negative for gait problem and myalgias  Skin: Negative for rash  Neurological: Negative for dizziness, seizures, speech difficulty, light-headedness and headaches  Past Medical and Surgical History:     Past Medical History:   Diagnosis Date    Acute renal failure ([place] [address] )     [id number] resolved    Diabetes mellitus ([place] [address] )     Enteritis 08/23/2016    Gastroparesis due to DM ([place] [address] ) 08/23/2016    GERD (gastroesophageal reflux disease)     Hernia, ventral 08/04/2016    Hyperlipidemia     Hypertension        Past Surgical History:   Procedure Laterality Date    COLOSTOMY N/A 2/20/2022    Procedure: COLOSTOMY LOOP, diverting;  Surgeon: Laurinda Lundborg, MD;  [facility];  Service: General    ESOPHAGOGASTRODUODENOSCOPY N/A 8/24/2016    Procedure: ESOPHAGOGASTRODUODENOSCOPY (EGD); Surgeon: Michael Vanegas MD;  Location: [facility];   Service:     IR PORT PLACEMENT  3/10/2022    KIDNEY STONE SURGERY      LIVER BIOPSY LAPAROSCOPIC N/A 2/20/2022    Procedure: DIAGNOSTIC LAPAROSCOPIC LIVER BIOPSY, DIVERTING LOOP COLOSTOMY ;  Surgeon: Ynes Huerta MD;  [facility];  Service: General    TONSILLECTOMY      UMBILICAL HERNIA REPAIR      UMBILICAL HERNIA REPAIR LAPAROSCOPIC N/A 4/26/2019    Procedure: LAPAROSCOPIC UMBILICAL HERNIA REPAIR;  Surgeon: Ynes Huerta MD;  [facility];  Service: General       Meds/Allergies:    Prior to Admission medications    Medication Sig Start Date End Date Taking? Authorizing Provider   amLODIPine (NORVASC) 5 mg tablet TAKE 1 TABLET BY MOUTH TWICE A DAY 2/8/22  Yes Lawson Santillan MD   aspirin 81 MG tablet Take 81 mg by mouth daily     Yes Historical Provider, MD   atorvastatin (LIPITOR) 40 mg tablet TAKE 1 TABLET BY MOUTH EVERY DAY 3/11/22  Yes Lawson Santillan MD   B-D UF III MINI PEN NEEDLES 31G X 5 MM MISC BY DOES NOT APPLY ROUTE 4 (FOUR) TIMES A DAY 2/15/21  Yes Stanislav Domingo PA-C   Cholecalciferol (VITAMIN D3 PO) Take 400 Units by mouth daily   Yes Historical Provider, MD   glyBURIDE-metFORMIN (GLUCOVANCE) 5-500 MG per tablet Take 1 tablet by mouth daily with breakfast   Yes Historical Provider, MD   insulin glargine (Lantus) 100 units/mL subcutaneous injection Inject 10 Units under the skin daily at bedtime 2/25/22  Yes Josy Chavira MD   Januvia 100 MG tablet TAKE 1 TABLET BY MOUTH EVERY DAY 3/11/22  Yes Lawson Santillan MD   lisinopril (ZESTRIL) 40 mg tablet TAKE 1 TABLET BY MOUTH EVERY DAY 3/11/22  Yes Lawson Santillan MD   metoclopramide (REGLAN) 10 mg tablet Take 1 tablet (10 mg total) by mouth 2 (two) times a day 3/11/22  Yes GAURANG Ponce   Multiple Vitamins-Minerals (multivitamin with minerals) tablet Take 1 tablet by mouth daily   Yes Historical Provider, MD   ondansetron (ZOFRAN) 4 mg tablet Take 4 mg by mouth every 6 (six) hours as needed 1/10/22  Yes Historical Provider, MD   Ostomy Supplies MISC Use in the morning 2/23/22  Yes Rodriguez Baker MD   pantoprazole (PROTONIX) 40 mg tablet TAKE 1 TABLET BY MOUTH TWICE A DAY 3/11/22  Yes Rodriguez Baker MD   Continuous Blood Gluc Sensor (FreeStyle [place] 14 Day Sensor) MISC Use 1 each every 14 (fourteen) days  Patient not taking: Reported on 3/12/2022  3/11/22   GAURANG Marsh   fluorouracil 5,425 mg in CADD infusion pump Infuse 5,425 mg (1,200 mg/m2/day x 2 26 m2) into a venous catheter over 46 hours for 2 days  Do not start before March 14, 2022  Patient not taking: Reported on 3/12/2022  3/14/22 3/16/22  Zeyad Cain MD   Needle, Disp, (HYPODERMIC NEEDLE) 23G X 1-1/2" MISC by Does not apply route once a week 5/10/18   Rain Dash PA-C     I have reviewed home medications using allscripts  Allergies: Allergies   Allergen Reactions    Shellfish-Derived Products - Food Allergy Anaphylaxis    Erythromycin GI Intolerance     Pt denies       Social History:     Marital Status: /Civil Union     Substance Use History:   Social History     Substance and Sexual Activity   Alcohol Use Not Currently    Comment: occasion     Social History     Tobacco Use   Smoking Status Never Smoker   Smokeless Tobacco Never Used     Social History     Substance and Sexual Activity   Drug Use No       Family History:    non-contributory    Physical Exam:     Vitals:   Blood Pressure: 145/78 (03/12/22 0801)  Pulse: 86 (03/12/22 0801)  Temperature: 98 3 °F (36 8 °C) (03/12/22 0801)  Temp Source: Oral (03/12/22 0600)  Respirations: 18 (03/12/22 0801)  SpO2: 93 % (03/12/22 0801)    Physical Exam  Vitals reviewed  Constitutional:       General: He is not in acute distress  Appearance: He is obese  He is not ill-appearing  HENT:      Head: Normocephalic and atraumatic  Nose: Nose normal  No congestion  Mouth/Throat:      Mouth: Mucous membranes are dry  Pharynx: Oropharynx is clear     Eyes:      Conjunctiva/sclera: Conjunctivae normal       Pupils: Pupils are equal, round, and reactive to light  Cardiovascular:      Rate and Rhythm: Normal rate and regular rhythm  Pulses: Normal pulses  Pulmonary:      Effort: Pulmonary effort is normal  No respiratory distress  Breath sounds: Normal breath sounds  No wheezing or rales  Abdominal:      General: Bowel sounds are normal  There is distension  Palpations: Abdomen is soft  Tenderness: There is no abdominal tenderness  There is no guarding or rebound  Musculoskeletal:         General: No swelling  Normal range of motion  Cervical back: Normal range of motion and neck supple  Skin:     General: Skin is warm and dry  Findings: No rash  Neurological:      General: No focal deficit present  Mental Status: He is alert and oriented to person, place, and time  Psychiatric:         Mood and Affect: Mood normal        Additional Data:     Lab Results: I have personally reviewed pertinent reports  Results from last 7 days   Lab Units 03/12/22  0446   WBC Thousand/uL 22 17*   HEMOGLOBIN g/dL 9 1*   HEMATOCRIT % 28 6*   PLATELETS Thousands/uL 802*   NEUTROS PCT % 81*   LYMPHS PCT % 7*   MONOS PCT % 11   EOS PCT % 0     Results from last 7 days   Lab Units 03/12/22  0446   SODIUM mmol/L 133*   POTASSIUM mmol/L 3 8   CHLORIDE mmol/L 97*   CO2 mmol/L 23   BUN mg/dL 14   CREATININE mg/dL 1 38*   ANION GAP mmol/L 13   CALCIUM mg/dL 8 5   ALBUMIN g/dL 1 9*   TOTAL BILIRUBIN mg/dL 1 14*   ALK PHOS U/L 575*   ALT U/L 35   AST U/L 118*   GLUCOSE RANDOM mg/dL 269*         Results from last 7 days   Lab Units 03/12/22  0928   POC GLUCOSE mg/dl 377*         Results from last 7 days   Lab Units 03/12/22  0739 03/12/22  0446   LACTIC ACID mmol/L 1 1 2 8*       Imaging: I have personally reviewed pertinent reports  CT abdomen pelvis with contrast   Final Result by Jason Pak MD (03/12 2716)      Postoperative changes of recent right mid abdominal diverting colostomy    There is no evidence of obstruction  The small bowel loops are of normal caliber  Small amount of intra-abdominal and pelvic ascites  No free intraperitoneal air  Innumerable hepatic metastases similar to the prior exam       Reidentified apple core cortical lesion at the distal transverse colon  Workstation performed: [id number]         XR chest pa & lateral    (Results Pending)       Allscripts / Epic Records Reviewed: Yes     ** Please Note: This note has been constructed using a voice recognition system   **

## 2022-03-12 NOTE — ED PROVIDER NOTES
History  Chief Complaint   Patient presents with    Abdominal Pain     pt had a colostomy three weeks ago and states he has not had any movement, including gas all day     HPI  58-year-old male with past medical history of diabetes, hypertension, recent diagnosis of colon mass with hepatic metastasis, status post diverting colostomy during his hospital stay 3 weeks ago presents with no output from his colostomy  since eating pizza for lunch at approximately noon yesterday  Has not been passing gas  He states he has abdominal pain/distension  Symptoms are moderate, constant  He has felt warm but has not taken temperature  Scheduled to start chemotherapy Monday  Prior to Admission Medications   Prescriptions Last Dose Informant Patient Reported? Taking? B-D UF III MINI PEN NEEDLES 31G X 5 MM MISC  Self No No   Sig: BY DOES NOT APPLY ROUTE 4 (FOUR) TIMES A DAY   Cholecalciferol (VITAMIN D3 PO)  Self Yes No   Sig: Take 400 Units by mouth daily   Continuous Blood Gluc Sensor (FreeStyle [place] 14 Day Sensor) [facility]   No No   Sig: Use 1 each every 14 (fourteen) days   Januvia 100 MG tablet   No No   Sig: TAKE 1 TABLET BY MOUTH EVERY DAY   Multiple Vitamins-Minerals (multivitamin with minerals) tablet  Self Yes No   Sig: Take 1 tablet by mouth daily   Needle, Disp, (HYPODERMIC NEEDLE) 23G X 1-1/2" MISC  Self No No   Sig: by Does not apply route once a week   Ostomy Supplies MISC  Self No No   Sig: Use in the morning   amLODIPine (NORVASC) 5 mg tablet  Self No No   Sig: TAKE 1 TABLET BY MOUTH TWICE A DAY   aspirin 81 MG tablet  Self Yes No   Sig: Take 81 mg by mouth daily  atorvastatin (LIPITOR) 40 mg tablet   No No   Sig: TAKE 1 TABLET BY MOUTH EVERY DAY   fluorouracil 5,425 mg in CADD infusion pump   No No   Sig: Infuse 5,425 mg (1,200 mg/m2/day x 2 26 m2) into a venous catheter over 46 hours for 2 days  Do not start before March 14, 2022     glyBURIDE-metFORMIN (GLUCOVANCE) 5-500 MG per tablet   Yes No   Sig: Take 1 tablet by mouth daily with breakfast   insulin glargine (Lantus) 100 units/mL subcutaneous injection  Self No No   Sig: Inject 10 Units under the skin daily at bedtime   lisinopril (ZESTRIL) 40 mg tablet   No No   Sig: TAKE 1 TABLET BY MOUTH EVERY DAY   metoclopramide (REGLAN) 10 mg tablet   No No   Sig: Take 1 tablet (10 mg total) by mouth 2 (two) times a day   ondansetron (ZOFRAN) 4 mg tablet  Self Yes No   Sig: Take 4 mg by mouth every 6 (six) hours as needed   pantoprazole (PROTONIX) 40 mg tablet   No No   Sig: TAKE 1 TABLET BY MOUTH TWICE A DAY      Facility-Administered Medications: None       Past Medical History:   Diagnosis Date    Acute renal failure ([place] [address] )     [id number] resolved    Diabetes mellitus ([place] [address] )     Enteritis 08/23/2016    Gastroparesis due to DM ([place] [address] ) 08/23/2016    GERD (gastroesophageal reflux disease)     Hernia, ventral 08/04/2016    Hyperlipidemia     Hypertension        Past Surgical History:   Procedure Laterality Date    COLOSTOMY N/A 2/20/2022    Procedure: COLOSTOMY LOOP, diverting;  Surgeon: Yoshi Corral MD;  [facility];  Service: General    ESOPHAGOGASTRODUODENOSCOPY N/A 8/24/2016    Procedure: ESOPHAGOGASTRODUODENOSCOPY (EGD); Surgeon: Alley Quinonez MD;  [facility];   Service:     IR PORT PLACEMENT  3/10/2022    KIDNEY STONE SURGERY      LIVER BIOPSY LAPAROSCOPIC N/A 2/20/2022    Procedure: DIAGNOSTIC LAPAROSCOPIC LIVER BIOPSY, DIVERTING LOOP COLOSTOMY ;  Surgeon: Yoshi Corral MD;  [facility];  Service: General    TONSILLECTOMY      UMBILICAL HERNIA REPAIR      UMBILICAL HERNIA REPAIR LAPAROSCOPIC N/A 4/26/2019    Procedure: LAPAROSCOPIC UMBILICAL HERNIA REPAIR;  Surgeon: Yoshi Corral MD;  [facility];  Service: General       Family History   Problem Relation Age of Onset    Diabetes Mother         mellitus    Lung cancer Mother     Coronary artery disease Father      I have reviewed and agree with the history as documented  E-Cigarette/Vaping    E-Cigarette Use Never User      E-Cigarette/Vaping Substances    Nicotine No     THC No     CBD No     Flavoring No     Other No     Unknown No      Social History     Tobacco Use    Smoking status: Never Smoker    Smokeless tobacco: Never Used   Vaping Use    Vaping Use: Never used   Substance Use Topics    Alcohol use: Yes     Comment: occasion    Drug use: No       Review of Systems   Constitutional: Positive for fever  Negative for chills  HENT: Negative for dental problem and ear pain  Eyes: Negative for pain and redness  Respiratory: Negative for cough and shortness of breath  Cardiovascular: Negative for chest pain and palpitations  Gastrointestinal: Positive for abdominal distention, abdominal pain and constipation  Negative for nausea  Endocrine: Negative for polydipsia and polyphagia  Genitourinary: Negative for dysuria and frequency  Musculoskeletal: Negative for arthralgias and joint swelling  Skin: Negative for color change and rash  Neurological: Negative for dizziness and headaches  Psychiatric/Behavioral: Negative for behavioral problems and confusion  All other systems reviewed and are negative  Physical Exam  Physical Exam  Vitals and nursing note reviewed  Constitutional:       General: He is not in acute distress  Appearance: He is well-developed  He is not diaphoretic  HENT:      Head: Atraumatic  Right Ear: External ear normal       Left Ear: External ear normal       Nose: Nose normal    Eyes:      Conjunctiva/sclera: Conjunctivae normal       Pupils: Pupils are equal, round, and reactive to light  Neck:      Vascular: No JVD  Cardiovascular:      Rate and Rhythm: Normal rate and regular rhythm  Heart sounds: Normal heart sounds  No murmur heard  Pulmonary:      Effort: Pulmonary effort is normal  No respiratory distress  Breath sounds: Normal breath sounds  No wheezing     Abdominal: General: Bowel sounds are normal  There is no distension  Palpations: Abdomen is soft  Tenderness: There is abdominal tenderness  Comments: Colostomy bag in place without output   Musculoskeletal:         General: Normal range of motion  Cervical back: Normal range of motion and neck supple  Skin:     General: Skin is warm and dry  Capillary Refill: Capillary refill takes less than 2 seconds  Neurological:      Mental Status: He is alert and oriented to person, place, and time  Cranial Nerves: No cranial nerve deficit     Psychiatric:         Behavior: Behavior normal          Vital Signs  ED Triage Vitals   Temperature Pulse Respirations Blood Pressure SpO2   03/12/22 0431 03/12/22 0431 03/12/22 0431 03/12/22 0431 03/12/22 0431   (!) 100 6 °F (38 1 °C) (!) 129 20 140/78 98 %      Temp Source Heart Rate Source Patient Position - Orthostatic VS BP Location FiO2 (%)   03/12/22 0431 03/12/22 0431 03/12/22 0431 03/12/22 0431 --   Oral Monitor Sitting Right arm       Pain Score       03/12/22 0600       No Pain           Vitals:    03/12/22 0431 03/12/22 0530 03/12/22 0600   BP: 140/78 143/83 147/80   Pulse: (!) 129 104 100   Patient Position - Orthostatic VS: Sitting Sitting Sitting         Visual Acuity      ED Medications  Medications   piperacillin-tazobactam (ZOSYN) 3 375 g in sodium chloride 0 9 % 100 mL IVPB (3 375 g Intravenous New Bag 3/12/22 0612)   sodium chloride 0 9 % bolus 1,000 mL (has no administration in time range)   sodium chloride 0 9 % bolus 1,000 mL (1,000 mL Intravenous New Bag 3/12/22 0454)   acetaminophen (TYLENOL) tablet 975 mg (975 mg Oral Given 3/12/22 0458)   iohexol (OMNIPAQUE) 350 MG/ML injection (MULTI-DOSE) 100 mL (100 mL Intravenous Given 3/12/22 0541)       Diagnostic Studies  Results Reviewed     Procedure Component Value Units Date/Time    UA (URINE) with reflex to Scope [[id number]] Collected: 03/12/22 0629    Lab Status: No result Specimen: Urine, Other     Blood culture [[id number]] Collected: 03/12/22 0557    Lab Status: In process Specimen: Blood from Arm, Right Updated: 03/12/22 0609    Blood culture [[id number]] Collected: 03/12/22 0557    Lab Status: In process Specimen: Blood from Hand, Left Updated: 03/12/22 0609    COVID/FLU/RSV [[id number]]  (Normal) Collected: 03/12/22 0446    Lab Status: Final result Specimen: Nares from Nose Updated: 03/12/22 0553     SARS-CoV-2 Negative     INFLUENZA A PCR Negative     INFLUENZA B PCR Negative     RSV PCR Negative    Narrative:      FOR PEDIATRIC PATIENTS - copy/paste COVID Guidelines URL to browser: [email or web address]  [email or web address]    SARS-CoV-2 assay is a Nucleic Acid Amplification assay intended for the  qualitative detection of nucleic acid from SARS-CoV-2 in nasopharyngeal  swabs  Results are for the presumptive identification of SARS-CoV-2 RNA  Positive results are indicative of infection with SARS-CoV-2, the virus  causing COVID-19, but do not rule out bacterial infection or co-infection  with other viruses  Laboratories within the [place] and its  territories are required to report all positive results to the appropriate  public health authorities  Negative results do not preclude SARS-CoV-2  infection and should not be used as the sole basis for treatment or other  patient management decisions  Negative results must be combined with  clinical observations, patient history, and epidemiological information  This test has not been FDA cleared or approved  This test has been authorized by FDA under an Emergency Use Authorization  (EUA)  This test is only authorized for the duration of time the  declaration that circumstances exist justifying the authorization of the  emergency use of an in vitro diagnostic tests for detection of SARS-CoV-2  virus and/or diagnosis of COVID-19 infection under section 564(b)(1) of  the Act, 21 U  S C  463TJY-6(Z)(8), unless the authorization is terminated  or revoked sooner  The test has been validated but independent review by FDA  and CLIA is pending  Test performed using [facility] Gene[facility]: This RT-PCR assay targets N2,  a region unique to SARS-CoV-2  A conserved region in the E-gene was chosen  for pan-Sarbecovirus detection which includes SARS-CoV-2  Lactic acid, plasma [[id number]]  (Abnormal) Collected: 03/12/22 0446    Lab Status: Final result Specimen: Blood from Arm, Right Updated: 03/12/22 0545     LACTIC ACID 2 8 mmol/L     Narrative:      Result may be elevated if tourniquet was used during collection      Lactic acid 2 Hours [[id number]]     Lab Status: No result Specimen: Blood     Comprehensive metabolic panel [[id number]]  (Abnormal) Collected: 03/12/22 0446    Lab Status: Final result Specimen: Blood from Arm, Right Updated: 03/12/22 0532     Sodium 133 mmol/L      Potassium 3 8 mmol/L      Chloride 97 mmol/L      CO2 23 mmol/L      ANION GAP 13 mmol/L      BUN 14 mg/dL      Creatinine 1 38 mg/dL      Glucose 269 mg/dL      Calcium 8 5 mg/dL      Corrected Calcium 10 2 mg/dL       U/L      ALT 35 U/L      Alkaline Phosphatase 575 U/L      Total Protein 7 0 g/dL      Albumin 1 9 g/dL      Total Bilirubin 1 14 mg/dL      eGFR 56 ml/min/1 73sq m     Narrative:      [place] guidelines for Chronic Kidney Disease (CKD):     Stage 1 with normal or high GFR (GFR > 90 mL/min/1 73 square meters)    Stage 2 Mild CKD (GFR = 60-89 mL/min/1 73 square meters)    Stage 3A Moderate CKD (GFR = 45-59 mL/min/1 73 square meters)    Stage 3B Moderate CKD (GFR = 30-44 mL/min/1 73 square meters)    Stage 4 Severe CKD (GFR = 15-29 mL/min/1 73 square meters)    Stage 5 End Stage CKD (GFR <15 mL/min/1 73 square meters)  Note: GFR calculation is accurate only with a steady state creatinine    Lipase [[id number]]  (Normal) Collected: 03/12/22 0446    Lab Status: Final result Specimen: Blood from Arm, Right Updated: 03/12/22 0532     Lipase 216 u/L     CBC and differential [[id number]]  (Abnormal) Collected: 03/12/22 0446    Lab Status: Final result Specimen: Blood from Arm, Right Updated: 03/12/22 0514     WBC 22 17 Thousand/uL      RBC 3 88 Million/uL      Hemoglobin 9 1 g/dL      Hematocrit 28 6 %      MCV 74 fL      MCH 23 5 pg      MCHC 31 8 g/dL      RDW 18 6 %      MPV 10 9 fL      Platelets 563 Thousands/uL      nRBC 0 /100 WBCs      Neutrophils Relative 81 %      Immat GRANS % 1 %      Lymphocytes Relative 7 %      Monocytes Relative 11 %      Eosinophils Relative 0 %      Basophils Relative 0 %      Neutrophils Absolute 17 79 Thousands/µL      Immature Grans Absolute 0 31 Thousand/uL      Lymphocytes Absolute 1 50 Thousands/µL      Monocytes Absolute 2 50 Thousand/µL      Eosinophils Absolute 0 00 Thousand/µL      Basophils Absolute 0 07 Thousands/µL                  CT abdomen pelvis with contrast   Final Result by Guille Szymanski MD (03/12 1206)      Postoperative changes of recent right mid abdominal diverting colostomy  There is no evidence of obstruction  The small bowel loops are of normal caliber  Small amount of intra-abdominal and pelvic ascites  No free intraperitoneal air  Innumerable hepatic metastases similar to the prior exam       Reidentified apple core cortical lesion at the distal transverse colon  Workstation performed: [id number]                    Procedures  Procedures         ED Course                               SBIRT 20yo+      Most Recent Value   SBIRT (23 yo +)    In order to provide better care to our patients, we are screening all of our patients for alcohol and drug use  Would it be okay to ask you these screening questions? Yes Filed at: 03/12/2022 6603   Initial Alcohol Screen: US AUDIT-C     1  How often do you have a drink containing alcohol? 0 Filed at: 03/12/2022 0433   2   How many drinks containing alcohol do you have on a typical day you are drinking? 0 Filed at: 03/12/2022 0433   3a  Male UNDER 65: How often do you have five or more drinks on one occasion? 0 Filed at: 03/12/2022 0433   3b  FEMALE Any Age, or MALE 65+: How often do you have 4 or more drinks on one occassion? 0 Filed at: 03/12/2022 0433   Audit-C Score 0 Filed at: 03/12/2022 6424   XIAO: How many times in the past year have you    Used an illegal drug or used a prescription medication for non-medical reasons? Never Filed at: 03/12/2022 0433                    MDM  Patient presents with abdominal pain, constipation, fever  He is febrile, tachycardic, has leukocytosis and elevated lactic acid, given IV fluids and empiric antibiotics  CT scan negative for obstruction  Discussed with general surgery at [facility] [place], no surgical intervention at this time, will admit to [facility]  Disposition  Final diagnoses:   Abdominal pain   Fever     Time reflects when diagnosis was documented in both MDM as applicable and the Disposition within this note     Time User Action Codes Description Comment    3/12/2022  6:29 AM [facility] Add [R10 9] Abdominal pain     3/12/2022  6:29 AM [facility] Add [R50 9] Fever       ED Disposition     None      Follow-up Information    None         Patient's Medications   Discharge Prescriptions    No medications on file       No discharge procedures on file      PDMP Review     None          ED Provider  Electronically Signed by           Marci Ponce MD  03/12/22 4190

## 2022-03-12 NOTE — ASSESSMENT & PLAN NOTE
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

## 2022-03-12 NOTE — ASSESSMENT & PLAN NOTE
Lab Results   Component Value Date    HGBA1C 7 5 (H) 02/10/2022     · Hold home oral agents  · humalog SSI ac and hs   · Lantus 10 units q h s  · Consistent carb diet

## 2022-03-12 NOTE — CONSULTS
Consultation - General Surgery  Eloise Mane 61 y o  male MRN: [id number]  Unit/Bed#: -01 Encounter: [id number]                                                  Inpatient consult to Acute Care Surgery  Consult performed by: Chato Flores PA-C  Consult ordered by: Zach Mayo MD          Assessment/Plan   76I M with colon cancer and diverting loop colostomy  - s/p port placement at R chest wall  Abdominal pain and ostomy decrease output - now resolved  Sepsis - evidence by fever and WBC 22    No output or gas from stoma after eating heavier food at lunch  Developed abdominal pain overnight prompting presentation in the ED  No evidence of obstruction on CT scan  Patient did have fever 102 and WBC 22  Procalcitonin 1 15, LA normal  Blood cultures are pending  Started on Zosyn  He does now have resumption of BF and tolerating diet this morning    Plan  -- Patient has regained function of the ostomy with gas and stool in the bag  Stoma is healthy with beefy red appearance  Abdomen is soft and nontender to palpation  -- Continue diet as tolerated  -- Agree with anitbiotics  -- Nothing further from the surgical standpoint  We will sign off, but please contact with any further concerns  -- Patient may follow up as needed  ______________________________________________________________________    CHIEF COMPLAINT: I had pain after eating pizza and didn't have any colostomy function for 16h    HPI: Eloise Mane is a 61y o  year old male with PMHx of CKD 3, diabetes mellitus, and colon cancer with obstructing mass status post diverting loop colostomy creation  The patient has been doing well with colostomy at home  He states that yesterday he ate pizza at lunch and started to have discomfort brought he stopped having any gas or stool output from his colostomy for the rest the day  Woke up with pain and tenderness and presented to the hospital with concern for obstruction    He was found to be febrile with white count of 22 on admission  CT showed no evidence of obstruction  Patient has regained bowel function and has stool and gas in bag  He tolerated diet this morning  He denies any current fevers or chills and states he feels much better this morning he denies any chest pain or shortness of breath  He did have his Oqbghe-T-Zhyz placed this week  He is scheduled to start chemotherapy on Monday  He is anxious about not being able to start chemo if he h a bacteremia  as a bacteremia    Review of Systems   Constitutional: Negative for activity change, appetite change, chills and fever  HENT: Negative  Eyes: Negative  Respiratory: Negative  Cardiovascular: Negative  Gastrointestinal: Negative for abdominal distention, abdominal pain, diarrhea, nausea and vomiting  Endocrine: Negative  Genitourinary: Negative  Musculoskeletal: Negative  Skin: Negative  Allergic/Immunologic: Negative  Neurological: Negative  All other systems reviewed and are negative        Meds/Allergies   Allergies   Allergen Reactions    Shellfish-Derived Products - Food Allergy Anaphylaxis    Erythromycin GI Intolerance     Pt denies      current meds:   Current Facility-Administered Medications   Medication Dose Route Frequency    amLODIPine (NORVASC) tablet 5 mg  5 mg Oral BID    aspirin (ECOTRIN LOW STRENGTH) EC tablet 81 mg  81 mg Oral Daily    atorvastatin (LIPITOR) tablet 40 mg  40 mg Oral Daily    heparin (porcine) subcutaneous injection 5,000 Units  5,000 Units Subcutaneous Q8H [address]    insulin glargine (LANTUS) subcutaneous injection 10 Units 0 1 mL  10 Units Subcutaneous HS    insulin lispro (HumaLOG) 100 units/mL subcutaneous injection 1-6 Units  1-6 Units Subcutaneous TID AC    metoclopramide (REGLAN) tablet 10 mg  10 mg Oral BID    multi-electrolyte (PLASMALYTE-A/ISOLYTE-S PH 7 4) IV solution  100 mL/hr Intravenous Continuous    pantoprazole (PROTONIX) EC tablet 40 mg  40 mg Oral BID  piperacillin-tazobactam (ZOSYN) 3 375 g in sodium chloride 0 9 % 100 mL IVPB  3 375 g Intravenous Q6H     Facility-Administered Medications Ordered in Other Encounters   Medication Dose Route Frequency    sodium chloride 0 9 % infusion  75 mL/hr Intravenous Continuous          Historical Information   Past Medical History:   Diagnosis Date    Acute renal failure ([place] [address] )     [id number] resolved    Diabetes mellitus ([place] [address] )     Enteritis 08/23/2016    Gastroparesis due to DM ([place] [address] ) 08/23/2016    GERD (gastroesophageal reflux disease)     Hernia, ventral 08/04/2016    Hyperlipidemia     Hypertension      Past Surgical History:   Procedure Laterality Date    COLOSTOMY N/A 2/20/2022    Procedure: COLOSTOMY LOOP, diverting;  Surgeon: Alondra Resendez MD;  [facility];  Service: General    ESOPHAGOGASTRODUODENOSCOPY N/A 8/24/2016    Procedure: ESOPHAGOGASTRODUODENOSCOPY (EGD); Surgeon: Belle Sams MD;  [facility];   Service:     IR PORT PLACEMENT  3/10/2022    KIDNEY STONE SURGERY      LIVER BIOPSY LAPAROSCOPIC N/A 2/20/2022    Procedure: DIAGNOSTIC LAPAROSCOPIC LIVER BIOPSY, DIVERTING LOOP COLOSTOMY ;  Surgeon: Alondra Resendez MD;  [facility];  Service: General    TONSILLECTOMY      UMBILICAL HERNIA REPAIR      UMBILICAL HERNIA REPAIR LAPAROSCOPIC N/A 4/26/2019    Procedure: LAPAROSCOPIC UMBILICAL HERNIA REPAIR;  Surgeon: Alondra Resendez MD;  [facility];  Service: General     Social History   Social History     Substance and Sexual Activity   Alcohol Use Not Currently    Comment: occasion     Social History     Substance and Sexual Activity   Drug Use No     Social History     Tobacco Use   Smoking Status Never Smoker   Smokeless Tobacco Never Used       Family History:   Family History   Problem Relation Age of Onset    Diabetes Mother         mellitus    Lung cancer Mother     Coronary artery disease Father          Objective   Lab Results:   Lab Results Component Value Date    WBC 22 17 (H) 03/12/2022    WBC 10 8 07/21/2017    HGB 9 1 (L) 03/12/2022    HGB 13 6 07/21/2017    HCT 28 6 (L) 03/12/2022    HCT 39 3 07/21/2017     (H) 03/12/2022     07/21/2017     Lab Results   Component Value Date     05/02/2017    K 3 8 03/12/2022    K 4 3 03/09/2022    CL 97 (L) 03/12/2022    CL 97 03/09/2022    CO2 23 03/12/2022    CO2 19 (L) 03/09/2022    BUN 14 03/12/2022    BUN 20 03/09/2022    CREATININE 1 38 (H) 03/12/2022    CREATININE 1 18 05/02/2017    GLUCOSE 118 (H) 05/02/2017     Lab Results   Component Value Date    CALCIUM 8 5 03/12/2022    CALCIUM 9 5 05/02/2017     Lab Results   Component Value Date     (H) 03/12/2022    AST 77 (H) 03/09/2022    ALT 35 03/12/2022    ALT 44 03/09/2022    ALKPHOS 575 (H) 03/12/2022    ALKPHOS 94 04/10/2017    TBILI 1 14 (H) 03/12/2022    TBILI 0 9 03/09/2022    ALB 1 9 (L) 03/12/2022    ALB 4 1 04/10/2017     Lab Results   Component Value Date    INR 1 18 02/23/2022     Lab Results   Component Value Date    COLORU Yellow 03/12/2022    COLORU Yellow 04/10/2017    CLARITYU Clear 03/12/2022    SPECGRAV <=1 005 03/12/2022    SPECGRAV 1 028 04/10/2017    PHUR 6 0 03/12/2022    PHUR 5 5 04/10/2017    GLUCOSEU Negative 03/12/2022    KETONESU Negative 03/12/2022    KETONESU Negative 04/10/2017    BLOODU Negative 03/12/2022    NITRITE Negative 03/12/2022    NITRITE Negative 04/10/2017    LEUKOCYTESUR Negative 03/12/2022    LEUKOCYTESUR Negative 04/10/2017    BILIRUBINUR Negative 03/12/2022    BILIRUBINUR Negative 04/10/2017    UROBILINOGEN 0 2 03/12/2022    UROBILINOGEN 1 0 04/10/2017    RBCUA 0-2 03/09/2022    RBCUA 10-20 (A) 01/04/2020    WBCUA None seen 03/09/2022    WBCUA None Seen 01/04/2020    BACTERIA None seen 03/09/2022    BACTERIA None Seen 01/04/2020    BACTERIA None seen 04/10/2017         Intake/Output Summary (Last 24 hours) at 3/12/2022 1237  Last data filed at 3/12/2022 1009  Gross per 24 hour   Intake 2220 ml   Output 475 ml   Net 1745 ml     Invasive Devices  Report    Central Venous Catheter Line            Port A Cath 03/10/22 Right Chest 2 days          Peripheral Intravenous Line            Peripheral IV 03/12/22 Right Antecubital <1 day          Drain            Colostomy Loop RUQ 20 days                Physical Exam  Vitals: /78   Pulse 86   Temp 98 3 °F (36 8 °C)   Resp 18   Wt 110 kg (241 lb 6 5 oz)   SpO2 93%   BMI 34 64 kg/m²   GEN: A & O x 3, cooperative   HEENT: PERRLA EOMI, sclera anicterus, oral mucosa pinnk  NECK: supple   LUNGS: clear throughout, no wheezes or rhonchi  COR: RRR no murmur  ABD:  Abdomen soft, nontender, nondistended, colostomy intended to stay with beefy red appearance, healthy and patent with gas and stool in bag  EXTREM: FROM no joint deformities  Edema mild bilateral lower extremity  SKIN:  Display some pallor, no jaundice, no rash  NEURO: CN II -XII intact, no tremor, affect appropriate    Imaging Studies:   XR chest pa & lateral    Result Date: 3/12/2022  Impression: No acute cardiopulmonary disease  Workstation performed: [id number]     CT abdomen pelvis with contrast    Result Date: 3/12/2022  Impression: Postoperative changes of recent right mid abdominal diverting colostomy  There is no evidence of obstruction  The small bowel loops are of normal caliber  Small amount of intra-abdominal and pelvic ascites  No free intraperitoneal air  Innumerable hepatic metastases similar to the prior exam  Reidentified apple core cortical lesion at the distal transverse colon   Workstation performed: [id number]         Oracio Kurtz PA-C  3/12/2022

## 2022-03-13 LAB
ALBUMIN SERPL BCP-MCNC: 1.5 G/DL (ref 3.5–5)
ALP SERPL-CCNC: 411 U/L (ref 46–116)
ALT SERPL W P-5'-P-CCNC: 24 U/L (ref 12–78)
ANION GAP SERPL CALCULATED.3IONS-SCNC: 11 MMOL/L (ref 4–13)
AST SERPL W P-5'-P-CCNC: 47 U/L (ref 5–45)
BASOPHILS # BLD AUTO: 0.04 THOUSANDS/ΜL (ref 0–0.1)
BASOPHILS NFR BLD AUTO: 0 % (ref 0–1)
BILIRUB SERPL-MCNC: 0.85 MG/DL (ref 0.2–1)
BUN SERPL-MCNC: 12 MG/DL (ref 5–25)
CALCIUM ALBUM COR SERPL-MCNC: 9.8 MG/DL (ref 8.3–10.1)
CALCIUM SERPL-MCNC: 7.8 MG/DL (ref 8.3–10.1)
CHLORIDE SERPL-SCNC: 100 MMOL/L (ref 100–108)
CO2 SERPL-SCNC: 24 MMOL/L (ref 21–32)
CREAT SERPL-MCNC: 1.06 MG/DL (ref 0.6–1.3)
EOSINOPHIL # BLD AUTO: 0 THOUSAND/ΜL (ref 0–0.61)
EOSINOPHIL NFR BLD AUTO: 0 % (ref 0–6)
ERYTHROCYTE [DISTWIDTH] IN BLOOD BY AUTOMATED COUNT: 17.7 % (ref 11.6–15.1)
GFR SERPL CREATININE-BSD FRML MDRD: 77 ML/MIN/1.73SQ M
GLUCOSE SERPL-MCNC: 163 MG/DL (ref 65–140)
GLUCOSE SERPL-MCNC: 180 MG/DL (ref 65–140)
GLUCOSE SERPL-MCNC: 235 MG/DL (ref 65–140)
GLUCOSE SERPL-MCNC: 253 MG/DL (ref 65–140)
GLUCOSE SERPL-MCNC: 290 MG/DL (ref 65–140)
HCT VFR BLD AUTO: 22.4 % (ref 36.5–49.3)
HCT VFR BLD AUTO: 25 % (ref 36.5–49.3)
HGB BLD-MCNC: 7.5 G/DL (ref 12–17)
HGB BLD-MCNC: 7.9 G/DL (ref 12–17)
IMM GRANULOCYTES # BLD AUTO: 0.21 THOUSAND/UL (ref 0–0.2)
IMM GRANULOCYTES NFR BLD AUTO: 1 % (ref 0–2)
LYMPHOCYTES # BLD AUTO: 1.54 THOUSANDS/ΜL (ref 0.6–4.47)
LYMPHOCYTES NFR BLD AUTO: 9 % (ref 14–44)
MCH RBC QN AUTO: 23.4 PG (ref 26.8–34.3)
MCHC RBC AUTO-ENTMCNC: 33.5 G/DL (ref 31.4–37.4)
MCV RBC AUTO: 70 FL (ref 82–98)
MONOCYTES # BLD AUTO: 1.73 THOUSAND/ΜL (ref 0.17–1.22)
MONOCYTES NFR BLD AUTO: 10 % (ref 4–12)
NEUTROPHILS # BLD AUTO: 13.14 THOUSANDS/ΜL (ref 1.85–7.62)
NEUTS SEG NFR BLD AUTO: 80 % (ref 43–75)
NRBC BLD AUTO-RTO: 0 /100 WBCS
PLATELET # BLD AUTO: 668 THOUSANDS/UL (ref 149–390)
PMV BLD AUTO: 10.1 FL (ref 8.9–12.7)
POTASSIUM SERPL-SCNC: 3.5 MMOL/L (ref 3.5–5.3)
PROCALCITONIN SERPL-MCNC: 0.97 NG/ML
PROT SERPL-MCNC: 5.7 G/DL (ref 6.4–8.2)
RBC # BLD AUTO: 3.21 MILLION/UL (ref 3.88–5.62)
SODIUM SERPL-SCNC: 135 MMOL/L (ref 136–145)
WBC # BLD AUTO: 16.66 THOUSAND/UL (ref 4.31–10.16)

## 2022-03-13 PROCEDURE — 85018 HEMOGLOBIN: CPT | Performed by: FAMILY MEDICINE

## 2022-03-13 PROCEDURE — 85025 COMPLETE CBC W/AUTO DIFF WBC: CPT | Performed by: FAMILY MEDICINE

## 2022-03-13 PROCEDURE — 82948 REAGENT STRIP/BLOOD GLUCOSE: CPT

## 2022-03-13 PROCEDURE — 85014 HEMATOCRIT: CPT | Performed by: FAMILY MEDICINE

## 2022-03-13 PROCEDURE — 80053 COMPREHEN METABOLIC PANEL: CPT | Performed by: FAMILY MEDICINE

## 2022-03-13 PROCEDURE — 99232 SBSQ HOSP IP/OBS MODERATE 35: CPT | Performed by: FAMILY MEDICINE

## 2022-03-13 PROCEDURE — 84145 PROCALCITONIN (PCT): CPT | Performed by: FAMILY MEDICINE

## 2022-03-13 RX ORDER — LISINOPRIL 20 MG/1
40 TABLET ORAL DAILY
Status: DISCONTINUED | OUTPATIENT
Start: 2022-03-13 | End: 2022-03-14 | Stop reason: HOSPADM

## 2022-03-13 RX ADMIN — LISINOPRIL 40 MG: 20 TABLET ORAL at 12:18

## 2022-03-13 RX ADMIN — SODIUM CHLORIDE 3.38 G: 900 INJECTION, SOLUTION INTRAVENOUS at 18:47

## 2022-03-13 RX ADMIN — METOCLOPRAMIDE 10 MG: 10 TABLET ORAL at 09:09

## 2022-03-13 RX ADMIN — HEPARIN SODIUM 5000 UNITS: 5000 INJECTION INTRAVENOUS; SUBCUTANEOUS at 14:53

## 2022-03-13 RX ADMIN — AMLODIPINE BESYLATE 5 MG: 5 TABLET ORAL at 18:49

## 2022-03-13 RX ADMIN — INSULIN LISPRO 1 UNITS: 100 INJECTION, SOLUTION INTRAVENOUS; SUBCUTANEOUS at 09:08

## 2022-03-13 RX ADMIN — METOCLOPRAMIDE 10 MG: 10 TABLET ORAL at 18:47

## 2022-03-13 RX ADMIN — ATORVASTATIN CALCIUM 40 MG: 40 TABLET, FILM COATED ORAL at 09:09

## 2022-03-13 RX ADMIN — INSULIN LISPRO 3 UNITS: 100 INJECTION, SOLUTION INTRAVENOUS; SUBCUTANEOUS at 16:34

## 2022-03-13 RX ADMIN — AMLODIPINE BESYLATE 5 MG: 5 TABLET ORAL at 09:09

## 2022-03-13 RX ADMIN — SODIUM CHLORIDE 3.38 G: 900 INJECTION, SOLUTION INTRAVENOUS at 12:44

## 2022-03-13 RX ADMIN — PANTOPRAZOLE SODIUM 40 MG: 40 TABLET, DELAYED RELEASE ORAL at 09:09

## 2022-03-13 RX ADMIN — ASPIRIN 81 MG: 81 TABLET, COATED ORAL at 09:09

## 2022-03-13 RX ADMIN — SODIUM CHLORIDE 3.38 G: 900 INJECTION, SOLUTION INTRAVENOUS at 00:17

## 2022-03-13 RX ADMIN — HEPARIN SODIUM 5000 UNITS: 5000 INJECTION INTRAVENOUS; SUBCUTANEOUS at 06:13

## 2022-03-13 RX ADMIN — INSULIN LISPRO 3 UNITS: 100 INJECTION, SOLUTION INTRAVENOUS; SUBCUTANEOUS at 12:18

## 2022-03-13 RX ADMIN — SODIUM CHLORIDE 3.38 G: 900 INJECTION, SOLUTION INTRAVENOUS at 05:58

## 2022-03-13 RX ADMIN — INSULIN GLARGINE 10 UNITS: 100 INJECTION, SOLUTION SUBCUTANEOUS at 21:19

## 2022-03-13 RX ADMIN — PANTOPRAZOLE SODIUM 40 MG: 40 TABLET, DELAYED RELEASE ORAL at 18:47

## 2022-03-13 RX ADMIN — HEPARIN SODIUM 5000 UNITS: 5000 INJECTION INTRAVENOUS; SUBCUTANEOUS at 21:19

## 2022-03-13 NOTE — PROGRESS NOTES
[address] [facility]  Progress Note - Sandy Sepulveda 1965, 61 y o  male MRN: [id number]  Unit/Bed#: -01 Encounter: [id number]  Primary Care Provider: Maggie Jimenez MD   Date and time admitted to hospital: 3/12/2022  4:27 AM    SIRS (systemic inflammatory response syndrome) [facility])  Assessment & Plan  18-year-old male with underlying history of metastatic malignant neoplasm transverse colon status post recent colostomy in February 2022 who presented to the ED with symptoms of fevers/abdominal pain  · Meeting SIRS criteria on presentation due to fever/elevated white blood cell count/elevated lactate/tachycardia  · No clear source currently  · CT abdomen pelvis noted postoperative changes with no evidence of obstruction  Hepatic metastasis was noted  · Chest x-ray negative for evidence of pneumonia  · UA negative  · Flu RSV COVID-19 PCR negative  · Blood cultures x2 pending  · On IV Zosyn  · Procalcitonin trending down  · Lactate resolved    * Abdominal pain  Assessment & Plan  Recent history of colostomy secondary to malignant neoplasm transverse colon  Patient reported eating some heavy food yesterday following which he noted since known no stool/gas or liquid via his ostomy  He woke up at 3:00 a m  With no ostomy output and significant abdominal discomfort  · CT abdomen pelvis notes evidence of postoperative changes from recent right mid abdomen diverting colostomy  No evidence of obstruction  Small amount of intra-abdominal/pelvic ascites  No free air  Hepatic metastasis noted  · Requested General surgery evaluation of colostomy site  Patient has regained function of the ostomy with gas and stool in the bag  No surgical intervention needed        Colon cancer metastasized to liver [facility])  Assessment & Plan  · History of malignant neoplasm transverse colon with hepatic metastasis  · Status post diverting colostomy infect 2022  · Port placement done on 03/10/2022  · Patient reports initiation of chemotherapy on 2022  · Follows with Dr David Platt from Oncology  · I did inform Dr Charlie Pathak via tiger text that patient is likely discharge tomorrow  She will help in rescheduling appointment for chemo  Appreciate input  Type 2 diabetes mellitus without complication, without long-term current use of insulin (HCC)  Assessment & Plan    Lab Results   Component Value Date    HGBA1C 7 5 (H) 02/10/2022     · Hold home oral agents  · humalog SSI ac and hs   · Lantus 10 units q h s  · Consistent carb diet  Stage 3a chronic kidney disease (HCC)  Assessment & Plan  Cr baseline fluctuates, ranging 1 2-1 5   · 1 38 on arrival  · s/p IV fluids  · 1 06 today  · monitor    Benign essential hypertension  Assessment & Plan  · BP in acceptable range  · continue home amlodipine/ lisinopril      VTE Pharmacologic Prophylaxis:   Pharmacologic: Heparin  Mechanical VTE Prophylaxis in Place: Yes    Discussions with Specialists or Other Care Team Provider: oncology    Education and Discussions with Family / Patient: dw patient    Time Spent for Care: 30 minutes  More than 50% of total time spent on counseling and coordination of care as described above  Current Length of Stay: 0 day(s)    Current Patient Status: Observation   Certification Statement: The patient will continue to require additional inpatient hospital stay due to iv abx, awaiting blood cultures    Discharge Plan: likely tomorrow    Code Status: Level 1 - Full Code      Subjective:   Afebrile overnight  Ostomy working with stools/gas  Denies abdominal pain  No nausea vomiting  Tolerating diet  Objective:     Vitals:   Temp (24hrs), Av 7 °F (37 1 °C), Min:97 8 °F (36 6 °C), Max:99 1 °F (37 3 °C)    Temp:  [97 8 °F (36 6 °C)-99 1 °F (37 3 °C)] 97 8 °F (36 6 °C)  HR:  [] 103  Resp:  [18] 18  BP: (143-163)/(79-90) 144/86  SpO2:  [94 %] 94 %  Body mass index is 34 8 kg/m²       Input and Output Summary (last 24 hours): Intake/Output Summary (Last 24 hours) at 3/13/2022 1043  Last data filed at 3/13/2022 0700  Gross per 24 hour   Intake 2040 ml   Output 1510 ml   Net 530 ml       Physical Exam:     Physical Exam  Constitutional:       General: He is not in acute distress  Appearance: He is obese  He is not toxic-appearing  HENT:      Mouth/Throat:      Mouth: Mucous membranes are moist       Pharynx: Oropharynx is clear  Cardiovascular:      Rate and Rhythm: Normal rate and regular rhythm  Pulses: Normal pulses  Pulmonary:      Effort: Pulmonary effort is normal       Breath sounds: Normal breath sounds  Abdominal:      General: There is distension  Tenderness: There is no abdominal tenderness  Comments: Ostomy with formed stool   Musculoskeletal:      Right lower leg: No edema  Left lower leg: No edema  Neurological:      General: No focal deficit present  Mental Status: He is alert and oriented to person, place, and time     Psychiatric:         Mood and Affect: Mood normal          Additional Data:     Labs:    Results from last 7 days   Lab Units 03/13/22  0505   WBC Thousand/uL 16 66*   HEMOGLOBIN g/dL 7 5*   HEMATOCRIT % 22 4*   PLATELETS Thousands/uL 668*   NEUTROS PCT % 80*   LYMPHS PCT % 9*   MONOS PCT % 10   EOS PCT % 0     Results from last 7 days   Lab Units 03/13/22  0503   SODIUM mmol/L 135*   POTASSIUM mmol/L 3 5   CHLORIDE mmol/L 100   CO2 mmol/L 24   BUN mg/dL 12   CREATININE mg/dL 1 06   ANION GAP mmol/L 11   CALCIUM mg/dL 7 8*   ALBUMIN g/dL 1 5*   TOTAL BILIRUBIN mg/dL 0 85   ALK PHOS U/L 411*   ALT U/L 24   AST U/L 47*   GLUCOSE RANDOM mg/dL 163*         Results from last 7 days   Lab Units 03/13/22  0441 03/12/22  2133 03/12/22  1556 03/12/22  1118 03/12/22  0928   POC GLUCOSE mg/dl 180* 185* 239* 300* 377*         Results from last 7 days   Lab Units 03/13/22  0503 03/12/22  1033 03/12/22  0739 03/12/22  0446   LACTIC ACID mmol/L  --   -- 1 1 2 8*   PROCALCITONIN ng/ml 0 97* 1 15*  --   --        * I Have Reviewed All Lab Data Listed Above  * Additional Pertinent Lab Tests Reviewed: All Labs Within Last 24 Hours Reviewed      Recent Cultures (last 7 days):     Results from last 7 days   Lab Units 03/12/22  0557   BLOOD CULTURE  Received in Microbiology Lab  Culture in Progress  Received in Microbiology Lab  Culture in Progress  Last 24 Hours Medication List:   Current Facility-Administered Medications   Medication Dose Route Frequency Provider Last Rate    amLODIPine  5 mg Oral BID Ashley Martines MD      aspirin  81 mg Oral Daily Ashley Martines MD      atorvastatin  40 mg Oral Daily Ashley Martines MD      heparin (porcine)  5,000 Units Subcutaneous [facility] Ashley Martines MD      insulin glargine  10 Units Subcutaneous HS Ashley Martines MD     Leslie  insulin lispro  1-6 Units Subcutaneous TID AC Ashley Martines MD      lisinopril  40 mg Oral Daily Ashley Martines MD      metoclopramide  10 mg Oral BID Ashley Martines MD      pantoprazole  40 mg Oral BID Ashley Martines MD      piperacillin-tazobactam  3 375 g Intravenous Alberto Kumari MD 3 375 g (03/13/22 0558)     Facility-Administered Medications Ordered in Other Encounters   Medication Dose Route Frequency Provider Last Rate    sodium chloride  75 mL/hr Intravenous Continuous Chastity Flores MD Stopped (03/10/22 3515)        Today, Patient Was Seen By: GEM Uriostegui      ** Please Note: Dictation voice to text software may have been used in the creation of this document   **

## 2022-03-13 NOTE — ASSESSMENT & PLAN NOTE
Cr baseline fluctuates, ranging 1 2-1 5   · 1 38 on arrival  · s/p IV fluids  · 1 06 today  · monitor

## 2022-03-13 NOTE — UTILIZATION REVIEW
Inpatient Admission Authorization Request   NOTIFICATION OF INPATIENT ADMISSION/INPATIENT AUTHORIZATION REQUEST   SERVICING FACILITY:   [address] [place] [address]  Tax ID: [id number]  NPI: [id number]  Place of Service: Inpatient [address] [facility]  [address]  Place of Service Code: 24     ATTENDING PROVIDER:  Attending Name and NPI#: Markell Hyder, [place] [[id number]]  Address: [address] [place] [address]  Phone: [phone]     UTILIZATION REVIEW CONTACT:  Galina Davis, Utilization   Network Utilization Review Department  Phone: [phone]  Fax [phone]  Email: [email or web address]     PHYSICIAN ADVISORY SERVICES:  FOR [id number] REVIEW - MEDICAL NECESSITY DENIAL  Phone: [phone]  Fax: [phone]  Email: [email or web address]     TYPE OF REQUEST:  Inpatient Status     ADMISSION INFORMATION:  ADMISSION DATE/TIME: N/A N/A  PATIENT DIAGNOSIS CODE/DESCRIPTION:  Abdominal pain [R10 9]  Fever [R50 9]  DISCHARGE DATE/TIME: No discharge date for patient encounter  IMPORTANT INFORMATION:  Please contact the Galina Davis directly with any questions or concerns regarding this request  Department voicemails are confidential     Send requests for admission clinical reviews, concurrent reviews, approvals, and administrative denials due to lack of clinical to fax [phone]

## 2022-03-13 NOTE — ASSESSMENT & PLAN NOTE
· History of malignant neoplasm transverse colon with hepatic metastasis  · Status post diverting colostomy infect 2022  · Port placement done on 03/10/2022  · Patient reports initiation of chemotherapy on 03/14/2022  · Follows with Dr Moran Heads from Oncology  · I did inform Dr Luis Alfredo Zapien via tiger text that patient is likely discharge tomorrow  She will help in rescheduling appointment for chemo  Appreciate input

## 2022-03-13 NOTE — ASSESSMENT & PLAN NOTE
Recent history of colostomy secondary to malignant neoplasm transverse colon  Patient reported eating some heavy food yesterday following which he noted since known no stool/gas or liquid via his ostomy  He woke up at 3:00 a m  With no ostomy output and significant abdominal discomfort  · CT abdomen pelvis notes evidence of postoperative changes from recent right mid abdomen diverting colostomy  No evidence of obstruction  Small amount of intra-abdominal/pelvic ascites  No free air  Hepatic metastasis noted  · Requested General surgery evaluation of colostomy site  Patient has regained function of the ostomy with gas and stool in the bag  No surgical intervention needed

## 2022-03-13 NOTE — ASSESSMENT & PLAN NOTE
25-year-old male with underlying history of metastatic malignant neoplasm transverse colon status post recent colostomy in February 2022 who presented to the ED with symptoms of fevers/abdominal pain  · Meeting SIRS criteria on presentation due to fever/elevated white blood cell count/elevated lactate/tachycardia  · No clear source currently  · CT abdomen pelvis noted postoperative changes with no evidence of obstruction  Hepatic metastasis was noted  · Chest x-ray negative for evidence of pneumonia  · UA negative  · Flu RSV COVID-19 PCR negative  · Blood cultures x2 pending    · On IV Zosyn  · Procalcitonin trending down  · Lactate resolved

## 2022-03-13 NOTE — UTILIZATION REVIEW
Initial Clinical Review    Admission: Date/Time/Statement:   Admission Orders (From admission, onward)     Ordered        03/12/22 0646  Place in Observation  Once                      Orders Placed This Encounter   Procedures    Place in Observation     Standing Status:   Standing     Number of Occurrences:   1     Order Specific Question:   Level of Care     Answer:   Med Surg [16]     ED Arrival Information     Expected Arrival Acuity    - 3/12/2022 04:21 Urgent         Means of arrival Escorted by Service Admission type    Walk-In Spouse Hospitalist Urgent         Arrival complaint    Abdominal Pain        Chief Complaint   Patient presents with    Abdominal Pain     pt had a colostomy three weeks ago and states he has not had any movement, including gas all day       Initial Presentation:  61 y o  male with history of DM2, CKD Stage IIIa, HTN, and metastatic malignant neoplasm  transverse colon s/p recent colostomy three weeks ago who presented to the ED with symptoms of fever abdominal pain and no ostomy output  He presented to the ED for evaluation of his colostomy site when he was also noted to have a fever  WBC 22 with lactate of 2 8  CT abdomen pelvis notes postoperative changes at right mid abdomen diverting colostomy with no evidence of obstruction  Patient had port placed this week, he is scheduled to start chemotherapy on Monday  Plan: Admit to Observation for evaluation and treatment of abdominal pain, SIRS:  Consult General Surgery, IV Zosyn, IV fluids, follow blood cultures  3/12 General Surgery consult:  No output or gas from stoma after eating heavier food at lunch  Developed abdominal pain overnight prompting presentation in the ED  No evidence of obstruction on CT scan  Patient did have fever 102 and WBC 22  Procalcitonin 1 15, LA normal  Blood cultures are pending  Started on Zosyn  He does now have resumption of BF and tolerating diet this morning    Patient has regained function of the ostomy with gas and stool in the bag  Stoma is healthy with beefy red appearance  Abdomen is soft and nontender to palpation  Continue diet as tolerated, agree with antibiotics  Nothing further from the surgical standpoint  3/13 Internal Medicine: Afebrile overnight, ostomy working with stools/gas, denies abdominal pain, tolerating diet  Procalcitonin trending down, lactate resolved  Creatinine 1 06 today s/p fluids, monitor  Continue IV antibiotics, await blood cultures  ED Triage Vitals   Temperature Pulse Respirations Blood Pressure SpO2   03/12/22 0431 03/12/22 0431 03/12/22 0431 03/12/22 0431 03/12/22 0431   (!) 100 6 °F (38 1 °C) (!) 129 20 140/78 98 %      Temp Source Heart Rate Source Patient Position - Orthostatic VS BP Location FiO2 (%)   03/12/22 0431 03/12/22 0431 03/12/22 0431 03/12/22 0431 --   Oral Monitor Sitting Right arm       Pain Score       03/12/22 0600       No Pain          Wt Readings from Last 1 Encounters:   03/13/22 110 kg (242 lb 8 1 oz)     Additional Vital Signs:       Date/Time Temp Pulse Resp BP MAP (mmHg) SpO2 O2 Device   03/13/22 0909 -- -- -- 144/86 -- -- --   03/13/22 0700 97 8 °F (36 6 °C) 103 18 143/79 -- 94 % None (Room air)   03/12/22 2351 99 1 °F (37 3 °C) 98 18 155/81 -- -- --   03/12/22 23:00:49 -- 98 18 155/81 106 94 % --   03/12/22 17:54:20 -- 107 Abnormal  -- -- -- 94 % --   03/12/22 17:54:19 -- -- -- 162/90 114 -- --   03/12/22 1753 -- -- -- 162/90 -- -- --   03/12/22 15:55:21 99 1 °F (37 3 °C) -- 18 163/87 112 -- --   03/12/22 08:01:32 98 3 °F (36 8 °C) 86 18 145/78 100 93 % --   03/12/22 0730 -- 87 18 142/78 102 98 % None (Room air)   03/12/22 0630 -- 105 -- 137/75 99 97 % None (Room air)   03/12/22 0600 99 1 °F (37 3 °C) 100 -- 147/80 108 95 % None (Room air)   03/12/22 0530 -- 104 -- 143/83 106 95 % None (Room air)         Pertinent Labs/Diagnostic Test Results:     XR chest pa & lateral   (03/12)      No acute cardiopulmonary disease        CT abdomen pelvis with contrast    (03/12)      Postoperative changes of recent right mid abdominal diverting colostomy  There is no evidence of obstruction  The small bowel loops are of normal caliber  Small amount of intra-abdominal and pelvic ascites  No free intraperitoneal air  Innumerable hepatic metastases similar to the prior exam       Reidentified apple core cortical lesion at the distal transverse colon  Results from last 7 days   Lab Units 03/12/22  0446   SARS-COV-2  Negative     Results from last 7 days   Lab Units 03/13/22  1146 03/13/22  0505 03/12/22  1033 03/12/22  0446 03/09/22  0911   WBC Thousand/uL  --  16 66*  --  22 17*  --    WHITE BLOOD CELL COUNT  x10E3/uL  --   --   --   --  17 8*   HEMOGLOBIN g/dL 7 9* 7 5*  --  9 1*  --    HEMOGLOBIN  g/dL  --   --   --   --  8 2*   HEMATOCRIT % 25 0* 22 4*  --  28 6*  --    HEMATOCRIT  %  --   --   --   --  27 1*   PLATELETS Thousands/uL  --  668* 800* 802*  --    PLATELETS  Z10F1/YT  --   --   --   --  736*   NEUTROS ABS Thousands/µL  --  13 14*  --  17 79*  --    NEUTROS ABS   x10E3/uL  --   --   --   --  13 6*         Results from last 7 days   Lab Units 03/13/22  0503 03/12/22 0446 03/09/22  0911   SODIUM mmol/L 135* 133* 134   POTASSIUM mmol/L 3 5 3 8 4 3   CHLORIDE mmol/L 100 97* 97   CO2 mmol/L 24 23 19*   ANION GAP mmol/L 11 13  --    BUN mg/dL 12 14 20   CREATININE mg/dL 1 06 1 38* 1 11   EGFR ml/min/1 73sq m 77 56 77   CALCIUM mg/dL 7 8* 8 5  --      Results from last 7 days   Lab Units 03/13/22  0503 03/12/22  0446 03/09/22  0911   AST U/L 47* 118* 77*   ALT U/L 24 35 44   ALK PHOS U/L 411* 575*  --    TOTAL PROTEIN g/dL 5 7* 7 0 5 7*   ALBUMIN g/dL 1 5* 1 9* 2 6*   TOTAL BILIRUBIN mg/dL 0 85 1 14* 0 9     Results from last 7 days   Lab Units 03/13/22  1045 03/13/22  0441 03/12/22  2133 03/12/22  1556 03/12/22  1118 03/12/22  0928   POC GLUCOSE mg/dl 253* 180* 185* 239* 300* 377*     Results from last 7 days   Lab Units 03/13/22  0503 03/12/22  0446 03/09/22  0911   GLUCOSE RANDOM mg/dL 163* 269* 199*       Results from last 7 days   Lab Units 03/09/22  0911   PH  5 5         Results from last 7 days   Lab Units 03/13/22  0503 03/12/22  1033   PROCALCITONIN ng/ml 0 97* 1 15*     Results from last 7 days   Lab Units 03/12/22  0739 03/12/22  0446   LACTIC ACID mmol/L 1 1 2 8*         Results from last 7 days   Lab Units 03/12/22  0446   LIPASE u/L 216                 Results from last 7 days   Lab Units 03/12/22  0629 03/09/22  0911   CLARITY UA  Clear  --    COLOR UA  Yellow Yellow   SPEC GRAV UA  <=1 005 1 018   PH UA  6 0  --    GLUCOSE UA mg/dl Negative  --    KETONES UA mg/dl Negative Negative   BLOOD UA  Negative Negative   PROTEIN UA mg/dl Negative  --    NITRITE UA  Negative Negative   BILIRUBIN UA  Negative  --    BILIRUBIN, URINE   --  Negative   UROBILINOGEN UA E U /dl 0 2 0 2   LEUKOCYTES UA  Negative Negative   WBC UA /hpf  --  None seen   RBC UA /hpf  --  0-2   BACTERIA UA   --  None seen   EPITHELEAL /hpf  --  None seen     Results from last 7 days   Lab Units 03/12/22  0446   INFLUENZA A PCR  Negative   INFLUENZA B PCR  Negative   RSV PCR  Negative         Results from last 7 days   Lab Units 03/12/22  0557   BLOOD CULTURE  Received in Microbiology Lab  Culture in Progress  Received in Microbiology Lab  Culture in Progress                 ED Treatment:   Medication Administration from 03/12/2022 0421 to 03/12/2022 0745       Date/Time Order Dose Route Action     03/12/2022 0632 sodium chloride 0 9 % bolus 1,000 mL 0 mL Intravenous Stopped     03/12/2022 0454 sodium chloride 0 9 % bolus 1,000 mL 1,000 mL Intravenous New Bag     03/12/2022 0458 acetaminophen (TYLENOL) tablet 975 mg 975 mg Oral Given     03/12/2022 0541 iohexol (OMNIPAQUE) 350 MG/ML injection (MULTI-DOSE) 100 mL 100 mL Intravenous Given     03/12/2022 0738 piperacillin-tazobactam (ZOSYN) 3 375 g in sodium chloride 0 9 % 100 mL IVPB 0 g Intravenous Stopped     03/12/2022 0612 piperacillin-tazobactam (ZOSYN) 3 375 g in sodium chloride 0 9 % 100 mL IVPB 3 375 g Intravenous New Bag     03/12/2022 0738 sodium chloride 0 9 % bolus 1,000 mL 0 mL Intravenous Stopped     03/12/2022 6946 sodium chloride 0 9 % bolus 1,000 mL 1,000 mL Intravenous New Bag        Past Medical History:   Diagnosis Date    Acute renal failure ([place] [address] )     [id number] resolved    Diabetes mellitus ([place] [address] )     Enteritis 08/23/2016    Gastroparesis due to DM ([place] [address] ) 08/23/2016    GERD (gastroesophageal reflux disease)     Hernia, ventral 08/04/2016    Hyperlipidemia     Hypertension      Present on Admission:   Type 2 diabetes mellitus without complication, without long-term current use of insulin (HCC)   Benign essential hypertension   Stage 3a chronic kidney disease ([facility])   Thrombocytosis      Admitting Diagnosis: Abdominal pain [R10 9]  Fever [R50 9]  Age/Sex: 61 y o  male     Admission Orders:    SCD  Scheduled Medications:         amLODIPine, 5 mg, Oral, BID  aspirin, 81 mg, Oral, Daily  atorvastatin, 40 mg, Oral, Daily  heparin (porcine), 5,000 Units, Subcutaneous, Q8H SHANNON  insulin glargine, 10 Units, Subcutaneous, HS  insulin lispro, 1-6 Units, Subcutaneous, TID AC  metoclopramide, 10 mg, Oral, BID  pantoprazole, 40 mg, Oral, BID  piperacillin-tazobactam, 3 375 g, Intravenous, Q6H      Continuous IV Infusions: None  PRN Meds: None  IP CONSULT TO ACUTE CARE SURGERY  IP CONSULT TO CASE MANAGEMENT    Lizbet Navarro RN  Network Utilization Review Department          ATTENTION: Please call with any questions or concerns to [phone] and carefully listen to the prompts so that you are directed to the right person  All voicemails are confidential   [facility] all requests for admission clinical reviews, approved or denied determinations and any other requests to dedicated fax number below belonging to the campus where the patient is receiving treatment   List of dedicated fax numbers for the Facilities:  [address] [place] [address] DENIALS (Administrative/Medical Necessity) [phone]   [address]  (Maternity/NICU/Pediatrics) [address] [facility] [address]  [address] [place] [address] [id number] [place] [address] [address] [facility] [phone]

## 2022-03-14 ENCOUNTER — TELEPHONE (OUTPATIENT)
Dept: HEMATOLOGY ONCOLOGY | Facility: CLINIC | Age: 57
End: 2022-03-14

## 2022-03-14 ENCOUNTER — HOSPITAL ENCOUNTER (OUTPATIENT)
Dept: INFUSION CENTER | Facility: CLINIC | Age: 57
Discharge: HOME | End: 2022-03-14

## 2022-03-14 VITALS
WEIGHT: 241.62 LBS | HEART RATE: 83 BPM | SYSTOLIC BLOOD PRESSURE: 134 MMHG | OXYGEN SATURATION: 96 % | DIASTOLIC BLOOD PRESSURE: 79 MMHG | HEIGHT: 70 IN | BODY MASS INDEX: 34.59 KG/M2 | RESPIRATION RATE: 18 BRPM | TEMPERATURE: 98.2 F

## 2022-03-14 LAB
ANION GAP SERPL CALCULATED.3IONS-SCNC: 8 MMOL/L (ref 4–13)
BASOPHILS # BLD AUTO: 0.05 THOUSANDS/ΜL (ref 0–0.1)
BASOPHILS NFR BLD AUTO: 0 % (ref 0–1)
BUN SERPL-MCNC: 11 MG/DL (ref 5–25)
CALCIUM SERPL-MCNC: 7.9 MG/DL (ref 8.3–10.1)
CHLORIDE SERPL-SCNC: 101 MMOL/L (ref 100–108)
CO2 SERPL-SCNC: 27 MMOL/L (ref 21–32)
CREAT SERPL-MCNC: 1.15 MG/DL (ref 0.6–1.3)
EOSINOPHIL # BLD AUTO: 0 THOUSAND/ΜL (ref 0–0.61)
EOSINOPHIL NFR BLD AUTO: 0 % (ref 0–6)
ERYTHROCYTE [DISTWIDTH] IN BLOOD BY AUTOMATED COUNT: 17.9 % (ref 11.6–15.1)
GFR SERPL CREATININE-BSD FRML MDRD: 70 ML/MIN/1.73SQ M
GLUCOSE P FAST SERPL-MCNC: 246 MG/DL (ref 65–99)
GLUCOSE SERPL-MCNC: 224 MG/DL (ref 65–140)
GLUCOSE SERPL-MCNC: 246 MG/DL (ref 65–140)
GLUCOSE SERPL-MCNC: 261 MG/DL (ref 65–140)
GLUCOSE SERPL-MCNC: 309 MG/DL (ref 65–140)
HCT VFR BLD AUTO: 21.3 % (ref 36.5–49.3)
HCT VFR BLD AUTO: 24.1 % (ref 36.5–49.3)
HGB BLD-MCNC: 7.1 G/DL (ref 12–17)
HGB BLD-MCNC: 7.4 G/DL (ref 12–17)
IMM GRANULOCYTES # BLD AUTO: 0.14 THOUSAND/UL (ref 0–0.2)
IMM GRANULOCYTES NFR BLD AUTO: 1 % (ref 0–2)
LYMPHOCYTES # BLD AUTO: 1.76 THOUSANDS/ΜL (ref 0.6–4.47)
LYMPHOCYTES NFR BLD AUTO: 13 % (ref 14–44)
MCH RBC QN AUTO: 23.1 PG (ref 26.8–34.3)
MCHC RBC AUTO-ENTMCNC: 33.3 G/DL (ref 31.4–37.4)
MCV RBC AUTO: 69 FL (ref 82–98)
MONOCYTES # BLD AUTO: 1.23 THOUSAND/ΜL (ref 0.17–1.22)
MONOCYTES NFR BLD AUTO: 9 % (ref 4–12)
NEUTROPHILS # BLD AUTO: 10.71 THOUSANDS/ΜL (ref 1.85–7.62)
NEUTS SEG NFR BLD AUTO: 77 % (ref 43–75)
NRBC BLD AUTO-RTO: 0 /100 WBCS
PLATELET # BLD AUTO: 635 THOUSANDS/UL (ref 149–390)
PMV BLD AUTO: 9.8 FL (ref 8.9–12.7)
POTASSIUM SERPL-SCNC: 3.3 MMOL/L (ref 3.5–5.3)
RBC # BLD AUTO: 3.07 MILLION/UL (ref 3.88–5.62)
SODIUM SERPL-SCNC: 136 MMOL/L (ref 136–145)
WBC # BLD AUTO: 13.89 THOUSAND/UL (ref 4.31–10.16)

## 2022-03-14 PROCEDURE — 85025 COMPLETE CBC W/AUTO DIFF WBC: CPT | Performed by: FAMILY MEDICINE

## 2022-03-14 PROCEDURE — 82948 REAGENT STRIP/BLOOD GLUCOSE: CPT

## 2022-03-14 PROCEDURE — 80048 BASIC METABOLIC PNL TOTAL CA: CPT | Performed by: FAMILY MEDICINE

## 2022-03-14 PROCEDURE — 85014 HEMATOCRIT: CPT | Performed by: FAMILY MEDICINE

## 2022-03-14 PROCEDURE — 99239 HOSP IP/OBS DSCHRG MGMT >30: CPT | Performed by: FAMILY MEDICINE

## 2022-03-14 PROCEDURE — 85018 HEMOGLOBIN: CPT | Performed by: FAMILY MEDICINE

## 2022-03-14 RX ORDER — POTASSIUM CHLORIDE 20 MEQ/1
40 TABLET, EXTENDED RELEASE ORAL ONCE
Status: COMPLETED | OUTPATIENT
Start: 2022-03-14 | End: 2022-03-14

## 2022-03-14 RX ORDER — SACCHAROMYCES BOULARDII 250 MG
250 CAPSULE ORAL 2 TIMES DAILY
Qty: 10 CAPSULE | Refills: 0 | Status: SHIPPED | OUTPATIENT
Start: 2022-03-14 | End: 2022-03-19

## 2022-03-14 RX ORDER — AMOXICILLIN AND CLAVULANATE POTASSIUM 875; 125 MG/1; MG/1
1 TABLET, FILM COATED ORAL EVERY 8 HOURS
Qty: 15 TABLET | Refills: 0 | Status: SHIPPED | OUTPATIENT
Start: 2022-03-14 | End: 2022-03-19

## 2022-03-14 RX ADMIN — AMLODIPINE BESYLATE 5 MG: 5 TABLET ORAL at 08:19

## 2022-03-14 RX ADMIN — POTASSIUM CHLORIDE 40 MEQ: 1500 TABLET, EXTENDED RELEASE ORAL at 08:33

## 2022-03-14 RX ADMIN — INSULIN LISPRO 4 UNITS: 100 INJECTION, SOLUTION INTRAVENOUS; SUBCUTANEOUS at 12:26

## 2022-03-14 RX ADMIN — SODIUM CHLORIDE 3.38 G: 900 INJECTION, SOLUTION INTRAVENOUS at 12:26

## 2022-03-14 RX ADMIN — SODIUM CHLORIDE 3.38 G: 900 INJECTION, SOLUTION INTRAVENOUS at 00:13

## 2022-03-14 RX ADMIN — PANTOPRAZOLE SODIUM 40 MG: 40 TABLET, DELAYED RELEASE ORAL at 08:19

## 2022-03-14 RX ADMIN — INSULIN LISPRO 2 UNITS: 100 INJECTION, SOLUTION INTRAVENOUS; SUBCUTANEOUS at 08:19

## 2022-03-14 RX ADMIN — ATORVASTATIN CALCIUM 40 MG: 40 TABLET, FILM COATED ORAL at 08:19

## 2022-03-14 RX ADMIN — LISINOPRIL 40 MG: 20 TABLET ORAL at 08:19

## 2022-03-14 RX ADMIN — SODIUM CHLORIDE 3.38 G: 900 INJECTION, SOLUTION INTRAVENOUS at 05:33

## 2022-03-14 RX ADMIN — ASPIRIN 81 MG: 81 TABLET, COATED ORAL at 08:19

## 2022-03-14 RX ADMIN — HEPARIN SODIUM 5000 UNITS: 5000 INJECTION INTRAVENOUS; SUBCUTANEOUS at 05:33

## 2022-03-14 RX ADMIN — METOCLOPRAMIDE 10 MG: 10 TABLET ORAL at 08:19

## 2022-03-14 NOTE — DISCHARGE SUMMARY
[facility]  Discharge- Nolvia Chaney 1965, 61 y o  male MRN: [id number]  Unit/Bed#: -01 Encounter: [id number]  Primary Care Provider: Tyler Mcdaniel MD   Date and time admitted to hospital: 3/12/2022  4:27 AM    SIRS (systemic inflammatory response syndrome) [facility])  Assessment & Plan  50-year-old male with underlying history of metastatic malignant neoplasm transverse colon status post recent colostomy in February 2022 who presented to the ED with symptoms of fevers/abdominal pain  · Meeting SIRS criteria on presentation due to fever/elevated white blood cell count/elevated lactate/tachycardia  · CT abdomen pelvis noted postoperative changes with no evidence of obstruction  Hepatic metastasis was noted  · Chest x-ray negative for evidence of pneumonia  · UA negative  · Flu RSV COVID-19 PCR negative  · Blood cultures x2 neg x 24 hrs  · On IV Zosyn  · Procalcitonin trending down  · Lactate resolved    Likely due to Inflammation ? Colitis in the setting of no ostomy output, dc on Augmentin to complete a 7 day course of abx  * Abdominal pain  Assessment & Plan  Recent history of colostomy secondary to malignant neoplasm transverse colon  Patient reported eating some heavy food a day prior to admission following which he noted no stool/gas or liquid via his ostomy  He woke up at 3:00 a m  With no ostomy output and significant abdominal discomfort  · CT abdomen pelvis notes evidence of postoperative changes from recent right mid abdomen diverting colostomy  No evidence of obstruction  Small amount of intra-abdominal/pelvic ascites  No free air  Hepatic metastasis noted  · Requested General surgery for evaluation of colostomy site  Patient has regained function of the ostomy with gas and stool in the bag  No surgical intervention needed        Colon cancer metastasized to liver [facility])  Assessment & Plan  · History of malignant neoplasm transverse colon with hepatic metastasis  · Status post diverting colostomy infect 2022  · Port placement done on 03/10/2022  · Patient reports plan was initiation of chemotherapy on 03/14/2022, will be rescheduled given patient unable to make it today due to hospitalization  · outpt fu with Dr Manuelito Bedolla      Type 2 diabetes mellitus without complication, without long-term current use of insulin [facility])  Assessment & Plan    Lab Results   Component Value Date    HGBA1C 7 5 (H) 02/10/2022     · Resume glyburide/ metfrmin  · humalog SSI ac and hs   · Lantus 10 units q h s  · Consistent carb diet  Stage 3a chronic kidney disease (HCC)  Assessment & Plan  Cr baseline fluctuates, ranging 1 2-1 5   · 1 38 on arrival  · s/p IV fluids  · 1 15 today      Benign essential hypertension  Assessment & Plan  · BP in acceptable range  · continue home amlodipine/ lisinopril      Discharging Physician / Practitioner: Candelaria Mckinney MD  PCP: Justina Rosario MD  Admission Date:   Admission Orders (From admission, onward)     Ordered        03/12/22 0646  Place in Observation  Once                      Discharge Date: 03/14/22    Disposition:    · Home with vna    Reason for Admission: abdominal pain/ fever    Discharge Diagnoses:     Please see assessment and plan section above for further details regarding discharge diagnoses  Medical Problems             Resolved Problems  Date Reviewed: 3/7/2022    None                Consultations During Hospital Stay:  · General surgery    Medication Adjustments and Discharge Medications:  · Summary of Medication Adjustments made as a result of this hospitalization: see med rec  · Medication Dosing Tapers - Please refer to Discharge Medication List for details on any medication dosing tapers (if applicable to patient)  · Medications being temporarily held (include recommended restart time): see med rec  · Discharge Medication List: See after visit summary for reconciled discharge medications         Diet Recommendations at Discharge:  Diet -        Diet Orders   (From admission, onward)             Start     Ordered    03/12/22 0805  Diet Germán/CHO Controlled; Consistent Carbohydrate Diet Level 2 (5 carb servings/75 grams CHO/meal)  Diet effective now        References:    Nutrtion Support Algorithm Enteral vs  Parenteral   Question Answer Comment   Diet Type Germán/CHO Controlled    Germán/CHO Controlled Consistent Carbohydrate Diet Level 2 (5 carb servings/75 grams CHO/meal)    RD to adjust diet per protocol? Yes        03/12/22 0807              Hospital Course:     Sebastian Spain is a 61 y o  male patient who originally presented to the hospital on 3/12/2022 with underlying history of metastatic malignant neoplasm transverse colon status post recent colostomy 3 weeks ago who presented to the ED with symptoms of fever abdominal pain and no ostomy output  Patient reported he was at his baseline state of health the prior day and had some heavy food  After eating food he noted complete lack of output from his ostomy site  There was no stool/no gas even  Eventually he woke up early morning because of significant abdominal discomfort and fever due to which he arrived in the ED  CT abdomen pelvis noted postoperative changes with no acute signs of infection or obstruction  Chest x-ray was negative  UA was negative  Flu RSV COVID-19 PCR was negative  He did have an elevated [place] of 87[address] with lactate of 2 8  Was empirically started on IV Zosyn/IV fluids and admitted under hospitalist service  Blood cultures x2 have been negative for 24 hours  Lactate results/procalcitonin level trending down  Likely due to inflammation possibly colitis in the setting of no ostomy output  Will discharge on Augmentin to complete a 7 day course of antibiotics  Patient in the meantime was also evaluated by General surgery  Patient has regained function of ostomy with gas/stool in the bag    No surgical intervention needed  Oncology informed that patient was hospitalized due to which his initiation of chemotherapy today on 03/14/2022 will be rescheduled  Outpatient follow-up with Dr Juan Jose Clark of care discussed with patient  He verbally states understanding and will be discharged home with home health  Condition at Discharge: good     Discharge Day Visit / Exam:     Vitals: Blood Pressure: 134/79 (03/14/22 0733)  Pulse: 83 (03/14/22 0733)  Temperature: 98 2 °F (36 8 °C) (03/14/22 0733)  Temp Source: Oral (03/13/22 2300)  Respirations: 18 (03/13/22 2300)  Height: 5' 10" (177 8 cm) (03/12/22 2351)  Weight - Scale: 110 kg (241 lb 10 oz) (03/14/22 0558)  SpO2: 98 % (03/14/22 0733)  Exam:   Physical Exam  Constitutional:       General: He is not in acute distress  Appearance: He is obese  He is not toxic-appearing  HENT:      Mouth/Throat:      Mouth: Mucous membranes are moist       Pharynx: Oropharynx is clear  Cardiovascular:      Rate and Rhythm: Normal rate and regular rhythm  Pulses: Normal pulses  Pulmonary:      Effort: Pulmonary effort is normal       Breath sounds: Normal breath sounds  Abdominal:      General: Abdomen is flat  Bowel sounds are normal       Palpations: Abdomen is soft  Comments: Ostomy site noted with formed stool   Musculoskeletal:      Right lower leg: No edema  Left lower leg: No edema  Neurological:      General: No focal deficit present  Mental Status: He is alert and oriented to person, place, and time  Psychiatric:         Mood and Affect: Mood normal          Goals of Care Discussions:  · Code Status at Discharge: Level 1 - Full Code    Discharge instructions/Information to patient and family:   See after visit summary section titled Discharge Instructions for information provided to patient and family  Discharge Statement:  I spent 40 minutes discharging the patient  This time was spent on the day of discharge   I had direct contact with the patient on the day of discharge  Greater than 50% of the total time was spent examining patient, answering all patient questions, arranging and discussing plan of care with patient as well as directly providing post-discharge instructions  Additional time then spent on discharge activities      ** Please Note: This note has been constructed using a voice recognition system **

## 2022-03-14 NOTE — TELEPHONE ENCOUNTER
Please see note below  Please reschedule treatment to next week  Please let me know of date changes  Thank you!

## 2022-03-14 NOTE — PROGRESS NOTES
Per dr Whit Singh rn, pt should be getting discharged from hospital today, they will hold his treatment for a week

## 2022-03-14 NOTE — CASE MANAGEMENT
Case Management Discharge Planning Note    Patient name Eloise Mane  Location /-01 MRN [id number]  : 1965 Date 3/14/2022       Current Admission Date: 3/12/2022  Current Admission Diagnosis:Abdominal pain   Patient Active Problem List    Diagnosis Date Noted    Colon cancer metastasized to liver ([facility] ) 2022    Abdominal pain 2022    SIRS (systemic inflammatory response syndrome) ([facility] ) 2022    Cancer of transverse colon ([facility] ) 2022    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 liver ([place] [address] ) 2022    Postoperative visit 2022    Iron deficiency anemia, unspecified 2022    Malignant neoplasm of transverse colon ([facility] ) 2022    Thrombocytosis 2022    Hypokalemia 2022    Transaminitis 2022    Stage 3a chronic kidney disease ([facility] ) 2022    Type 2 diabetes mellitus with hyperlipidemia ([place] [address] ) 2022    Colonic mass 2022    Microcytic anemia 2022    Left ureteral calculus 2020    Incarcerated umbilical hernia     Morbid obesity ([place] [address] ) 2018    Testicular hypogonadism 2017    Low testosterone 2017    Type 2 diabetes mellitus without complication, without long-term current use of insulin ([place] [address] ) 2016    Benign essential hypertension 2016    Mixed hyperlipidemia 2016    Erectile dysfunction 2016      LOS (days): 0  Geometric Mean LOS (GMLOS) (days):   Days to GMLOS:     OBJECTIVE:            Current admission status: Observation   Preferred Pharmacy:   [facility]pharmacy #[id number] - [place], [place] - [address] [place] [address]  [address] [place] [address]  Phone: [phone] Fax: [phone]    [address] [place] [address]  [address] [facility] [address]  Phone: [phone] Fax: [phone]    [id number]- [facility] [place] - [address] [facility] [address]  Phone: [phone] Fax: [phone]    Primary Care Provider: Rodriguez Baker MD    Primary Insurance: CIGINES  Secondary Insurance:     DISCHARGE DETAILS:    Discharge planning discussed with[de-identified] [facility] health     Comments - Freedom of Choice: Eduardo Villareal [phone] contacted for NICKIE                                 Other Referral/Resources/Interventions Provided:  Interventions: [facility] AT [facility]  Referral Comments: Eduardo Villareal [phone]         Treatment Team Recommendation: Home with [address]  Discharge Destination Plan[de-identified] Home with [address] of Transport (Date): 03/14/22  ETA of Transport (Time): 1700               informed by [facility] that patient will discharge today

## 2022-03-14 NOTE — PLAN OF CARE
Problem: Nutrition/Hydration-ADULT  Goal: Nutrient/Hydration intake appropriate for improving, restoring or maintaining nutritional needs  Description: Monitor and assess patient's nutrition/hydration status for malnutrition  Collaborate with interdisciplinary team and initiate plan and interventions as ordered  Monitor patient's weight and dietary intake as ordered or per policy  Utilize nutrition screening tool and intervene as necessary  Determine patient's food preferences and provide high-protein, high-caloric foods as appropriate       INTERVENTIONS:  - Monitor oral intake, urinary output, labs, and treatment plans  - Assess nutrition and hydration status and recommend course of action  - Evaluate amount of meals eaten   - Allow adequate time for meals  - Recommend/ encourage appropriate diets, oral nutritional supplements, and vitamin/mineral supplements  - Order, calculate, and assess calorie counts as needed  - Assess need for intravenous fluids  - Provide specific nutrition/hydration education as appropriate  - Include patient/family/caregiver in decisions related to nutrition  Outcome: Progressing     Problem: GASTROINTESTINAL - ADULT  Goal: Maintains or returns to baseline bowel function  Description: INTERVENTIONS:  - Assess bowel function  - Encourage oral fluids to ensure adequate hydration  - Administer IV fluids if ordered to ensure adequate hydration  - Administer ordered medications as needed  - Encourage mobilization and activity  - Consider nutritional services referral to assist patient with adequate nutrition and appropriate food choices  Outcome: Progressing  Goal: Maintains adequate nutritional intake  Description: INTERVENTIONS:  - Monitor percentage of each meal consumed  - Identify factors contributing to decreased intake, treat as appropriate  - Assist with meals as needed  - Monitor I&O, weight, and lab values if indicated  - Obtain nutrition services referral as needed  Outcome: Progressing  Goal: Establish and maintain optimal ostomy function  Description: INTERVENTIONS:  - Assess bowel function  - Encourage oral fluids to ensure adequate hydration  - Administer IV fluids if ordered to ensure adequate hydration   - Administer ordered medications as needed  - Encourage mobilization and activity  - Nutrition services referral to assist patient with appropriate food choices  - Assess stoma site  - Consider wound care consult   Outcome: Progressing     Problem: METABOLIC, FLUID AND ELECTROLYTES - ADULT  Goal: Glucose maintained within target range  Description: INTERVENTIONS:  - Monitor Blood Glucose as ordered  - Assess for signs and symptoms of hyperglycemia and hypoglycemia  - Administer ordered medications to maintain glucose within target range  - Assess nutritional intake and initiate nutrition service referral as needed  Outcome: Progressing     Problem: SKIN/TISSUE INTEGRITY - ADULT  Goal: Skin Integrity remains intact(Skin Breakdown Prevention)  Description: Assess:  -Perform Michael assessment every shift  -Clean and moisturize skin daily  -Inspect skin when repositioning, toileting, and assisting with ADLS  -Assess under medical devices such as ostomy every shift  -Assess extremities for adequate circulation and sensation       Outcome: Progressing

## 2022-03-14 NOTE — ASSESSMENT & PLAN NOTE
Lab Results   Component Value Date    HGBA1C 7 5 (H) 02/10/2022     · Resume glyburide/ metfrmin  · humalog SSI ac and hs   · Lantus 10 units q h s  · Consistent carb diet

## 2022-03-14 NOTE — ASSESSMENT & PLAN NOTE
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

## 2022-03-14 NOTE — UTILIZATION REVIEW
Continued Stay Review    Date: 3/14                      Current Patient Class: OBS  Current Level of Care:     HPI:56 y o  male initially admitted on  3/12    Assessment/Plan:     Vital Signs:     Pertinent Labs/Diagnostic Results:   Results from last 7 days   Lab Units 03/12/22 0446   SARS-COV-2  Negative     Results from last 7 days   Lab Units 03/14/22  0443 03/13/22  1146 03/13/22  0505 03/12/22  1033 03/12/22  0446 03/12/22  0446 03/09/22  0911   WBC Thousand/uL 13 89*  --  16 66*  --   --  22 17*  --    WHITE BLOOD CELL COUNT  x10E3/uL  --   --   --   --   --   --  17 8*   HEMOGLOBIN g/dL 7 1* 7 9* 7 5*  --   --  9 1*  --    HEMOGLOBIN  g/dL  --   --   --   --   --   --  8 2*   HEMATOCRIT % 21 3* 25 0* 22 4*  --   --  28 6*  --    HEMATOCRIT  %  --   --   --   --   --   --  27 1*   PLATELETS Thousands/uL 635*  --  668* 800*   < > 802*  --    PLATELETS  A15G2/NU  --   --   --   --   --   --  736*   NEUTROS ABS Thousands/µL 10 71*  --  13 14*  --   --  17 79*  --    NEUTROS ABS  x10E3/uL  --   --   --   --   --   --  13 6*    < > = values in this interval not displayed       Results from last 7 days   Lab Units 03/14/22 0443 03/13/22  0503 03/12/22 0446 03/09/22  0911   SODIUM mmol/L 136 135* 133* 134   POTASSIUM mmol/L 3 3* 3 5 3 8 4 3   CHLORIDE mmol/L 101 100 97* 97   CO2 mmol/L 27 24 23 19*   ANION GAP mmol/L 8 11 13  --    BUN mg/dL 11 12 14 20   CREATININE mg/dL 1 15 1 06 1 38* 1 11   EGFR ml/min/1 73sq m 70 77 56 77   CALCIUM mg/dL 7 9* 7 8* 8 5  --      Results from last 7 days   Lab Units 03/13/22  0503 03/12/22  0446 03/09/22  0911   AST U/L 47* 118* 77*   ALT U/L 24 35 44   ALK PHOS U/L 411* 575*  --    TOTAL PROTEIN g/dL 5 7* 7 0 5 7*   ALBUMIN g/dL 1 5* 1 9* 2 6*   TOTAL BILIRUBIN mg/dL 0 85 1 14* 0 9     Results from last 7 days   Lab Units 03/13/22  2045 03/13/22  1537 03/13/22  1045 03/13/22  0441 03/12/22  2133 03/12/22  1556 03/12/22  1118 03/12/22  0928   POC GLUCOSE mg/dl 290* 235* 253* 180* 185* 239* 300* 377*       Results from last 7 days   Lab Units 03/09/22  0911   PH  5 5     Results from last 7 days   Lab Units 03/13/22  0503 03/12/22  1033   PROCALCITONIN ng/ml 0 97* 1 15*     Results from last 7 days   Lab Units 03/12/22  0739 03/12/22  0446   LACTIC ACID mmol/L 1 1 2 8*       Results from last 7 days   Lab Units 03/12/22  0446   LIPASE u/L 216     Results from last 7 days   Lab Units 03/12/22  0629 03/09/22  0911   CLARITY UA  Clear  --    COLOR UA  Yellow Yellow   SPEC GRAV UA  <=1 005 1 018   PH UA  6 0  --    GLUCOSE UA mg/dl Negative  --    KETONES UA mg/dl Negative Negative   BLOOD UA  Negative Negative   PROTEIN UA mg/dl Negative  --    NITRITE UA  Negative Negative   BILIRUBIN UA  Negative  --    BILIRUBIN, URINE   --  Negative   UROBILINOGEN UA E U /dl 0 2 0 2   LEUKOCYTES UA  Negative Negative   WBC UA /hpf  --  None seen   RBC UA /hpf  --  0-2   BACTERIA UA   --  None seen   EPITHELEAL /hpf  --  None seen     Results from last 7 days   Lab Units 03/12/22  0446   INFLUENZA A PCR  Negative   INFLUENZA B PCR  Negative   RSV PCR  Negative       Results from last 7 days   Lab Units 03/12/22  0557   BLOOD CULTURE  No Growth at 24 hrs  No Growth at 24 hrs  Medications:   Scheduled Medications:  amLODIPine, 5 mg, Oral, BID  aspirin, 81 mg, Oral, Daily  atorvastatin, 40 mg, Oral, Daily  heparin (porcine), 5,000 Units, Subcutaneous, Q8H SHANNON  insulin glargine, 10 Units, Subcutaneous, HS  insulin lispro, 1-6 Units, Subcutaneous, TID AC  lisinopril, 40 mg, Oral, Daily  metoclopramide, 10 mg, Oral, BID  pantoprazole, 40 mg, Oral, BID  piperacillin-tazobactam, 3 375 g, Intravenous, Q6H      Continuous IV Infusions:     PRN Meds:       Discharge Plan: SALTY Mix, RN  Network Utilization Review Department  ATTENTION: Please call with any questions or concerns to [phone] and carefully listen to the prompts so that you are directed to the right person   All voicemails are confidential Sudie Crigler all requests for admission clinical reviews, approved or denied determinations and any other requests to dedicated fax number below belonging to the campus where the patient is receiving treatment   List of dedicated fax numbers for the Facilities:  [address] DENIALS (Administrative/Medical Necessity) [phone]   [address] (Maternity/NICU/Pediatrics) [phone] [address]  [address] [address] [place] [address] [address] [facility] [place] [address] [phone]

## 2022-03-14 NOTE — ASSESSMENT & PLAN NOTE
Recent history of colostomy secondary to malignant neoplasm transverse colon  Patient reported eating some heavy food a day prior to admission following which he noted no stool/gas or liquid via his ostomy  He woke up at 3:00 a m  With no ostomy output and significant abdominal discomfort  · CT abdomen pelvis notes evidence of postoperative changes from recent right mid abdomen diverting colostomy  No evidence of obstruction  Small amount of intra-abdominal/pelvic ascites  No free air  Hepatic metastasis noted  · Requested General surgery for evaluation of colostomy site  Patient has regained function of the ostomy with gas and stool in the bag  No surgical intervention needed

## 2022-03-14 NOTE — TELEPHONE ENCOUNTER
Reviewed with pt new appointment with Kymberly Albarado PA-C on 3/17/22 and infusion treatments scheduled for 3/23/22  Pt voiced concern about delay in treatment  I reviewed with pt the importance of seeing pt prior to starting treatment  Pt verbalized understanding and was agreeable to plan  Encouraged pt to reach out with questions and concerns

## 2022-03-14 NOTE — TELEPHONE ENCOUNTER
Received email about pt being hospitalized  Per Dr Koch and Dr David Platt pt to have treatment infusion rescheduled for next week  Pt will need a follow up appointment with Delmy Ma PA-C in a week prior to receiving treatment  Reached out to patient this morning per Dr David Platt request  Pt stated feeling well and not reporting any pain or discomfort  Pt expressed eagerness to start treatment  Pt stated still in hospital and pt treatment scheduled for 0830 at infusion center  Per Dr David Platt pt to visit our office prior to treatment and delay for a week  Pt agreeable to plan  Encouraged to reach out for further questions and concerns

## 2022-03-14 NOTE — ASSESSMENT & PLAN NOTE
58-year-old male with underlying history of metastatic malignant neoplasm transverse colon status post recent colostomy in February 2022 who presented to the ED with symptoms of fevers/abdominal pain  · Meeting SIRS criteria on presentation due to fever/elevated white blood cell count/elevated lactate/tachycardia  · CT abdomen pelvis noted postoperative changes with no evidence of obstruction  Hepatic metastasis was noted  · Chest x-ray negative for evidence of pneumonia  · UA negative  · Flu RSV COVID-19 PCR negative  · Blood cultures x2 neg x 24 hrs  · On IV Zosyn  · Procalcitonin trending down  · Lactate resolved    Likely due to Inflammation ? Colitis in the setting of no ostomy output, dc on Augmentin to complete a 7 day course of abx

## 2022-03-15 ENCOUNTER — TELEPHONE (OUTPATIENT)
Dept: INTERNAL MEDICINE CLINIC | Facility: CLINIC | Age: 57
End: 2022-03-15

## 2022-03-15 ENCOUNTER — TRANSITIONAL CARE MANAGEMENT (OUTPATIENT)
Dept: INTERNAL MEDICINE CLINIC | Facility: CLINIC | Age: 57
End: 2022-03-15

## 2022-03-15 DIAGNOSIS — C18.4 MALIGNANT NEOPLASM OF TRANSVERSE COLON (HCC): Primary | ICD-10-CM

## 2022-03-15 NOTE — UTILIZATION REVIEW
Observation Admission Authorization Request   NOTIFICATION OF OBSERVATION ADMISSION/OBSERVATION AUTHORIZATION REQUEST   SERVICING FACILITY:   [address] [place] [address]  Tax ID: [id number]  NPI: [id number]  Place of Service: On [address] [facility] Code: 22  CPT Code for Observation: CPT   CPT 92764     ATTENDING PROVIDER:  Attending Name and NPI#: Anitha Sabillon Md [[id number]]  Address: [address] [place] [address]  Phone: [phone]     UTILIZATION REVIEW CONTACT:  [facility] Utilization   Network Utilization Review Department  Phone: [phone]  Fax [phone]  Email: [email or web address]     PHYSICIAN ADVISORY SERVICES:  FOR DIEF-UO-TEEW REVIEW - MEDICAL NECESSITY DENIAL  Phone: [phone]  Fax: [phone]  Email: [email or web address]     TYPE OF REQUEST:  Observation Status     ADMISSION INFORMATION:  ADMISSION DATE/TIME: 03/12/22 @ 1193  PATIENT DIAGNOSIS CODE/DESCRIPTION:  Abdominal pain [R10 9]  Fever [R50 9]  DISCHARGE DATE/TIME: 3/14/2022  3:58 PM   IMPORTANT INFORMATION:  Please contact the [facility] directly with any questions or concerns regarding this request  Department voicemails are confidential     Send requests for admission clinical reviews, concurrent reviews, approvals, and administrative denials due to lack of clinical to fax [phone]

## 2022-03-15 NOTE — TELEPHONE ENCOUNTER
Spoke w/pt for TCM  He asked that I inform Dr Jani Sullivan that his BS yesterday was 335  He increased his Lantus last night from 10 untis to 20 units, His BS this morning was 167

## 2022-03-16 NOTE — PROGRESS NOTES
Hematology/Oncology Progress Note    Date of Service: 3/17/2022    [facility] [place] [place]   [address] [address]    Hem/Onc Problem List:   1  Metastatic Colon Cancer  2  Anemia     Chief Complaint:    Hospital follow-up prior to 1st chemo treatment    Assessment/Plan:     I personally reviewed the lab results, image studies results and other specialties/physicians consult notes and recommendations  I shared the findings with patient and family, discussed the diagnosis and management plan as below  1  Metastatic colon cancer with extensive liver metastasis, MMR expression intact  Status post colostomy  Patient is set to start mFOLFOX6 + Avastin 3/23/22  This was delayed as he was hospitalized for abdominal pain thought to be colitis  This resolved since  He has good output from ostomy  He will go for lab work a few days prior to treatment start  He will follow up prior to second treatment  Patient sees palliative care shortly to aid in symptom management       2  Macrocytic hypochromic anemia likely due to GI bleeding  He is set to begin Venofer 300mg x4 next week as well  3  Elevated alkaline phosphatase and transaminase due to liver metastasis  Disclaimer: This document was prepared using [employer] technology  If a word or phrase is confusing, or does not make sense, this is likely due to recognition error which was not discovered during the providers review  If you believe an error has occurred, please Contact me through [employer] service for dorothy? cation  AJCC 8th Edition Cancer Stage :      Cancer Staging  No matching staging information was found for the patient  Hematology/Oncology History:   · February 18, 2022 Patient was admitted to hospital because of abdominal pain    CT abdomen:  IMPRESSION:     1   Distal transverse colonic apple core lesion causing a degree of obstruction with dilated upstream colon with mild surrounding fat stranding   Small foci of gas peripheral to colonic stool may represent trapped air; however, pneumatosis cannot be   entirely excluded      2   Innumerable hepatic metastases      3   Nonenlarged mesenteric lymph nodes adjacent to the colonic mass, concerning for metastatic disease, given their location  · February 19, 2022, Chest wo contrast:  6 mm right upper lobe lung nodule  No definite metastatic disease in the chest   · February 20, 2022, S/P diagnostic laparoscopy with diverting loop transverse colostomy secondary to near obstructive apple core lesion of distal transverse colon and CT-guided biopsy of liver lesion with pathology seen below  Final Diagnosis   A  Liver, biopsy:  - Adenocarcinoma, consistent with colorectal primary      Comment: Immunohistochemistry was performed on block A1  The tumor cells are positive for CK20, SATB2, CDX2,  and negative for TTF-1, NKX3 1, Ck7; DPC-4 shows no loss, supporting the above diagnosis  Dr Elli Amado notified of the diagnosis in EPIC via [address] on 2/24/2022  at 11:36 am    Immunohistochemistry [facility]) testing for Mismatch Repair (MMR) proteins has been requested on block A1, and the results will be issued in an addendum          · February 20, 2022  MMR expression intact   2, CA 19-9 1848  Alkaline phosphatase elevated  · March 12, 2022 CT abdomen pelvis with contrast:  Postoperative changes of recent right mid abdominal diverting colostomy  No evidence of traction  Innumerable hepatic metastasis similar to the prior exam     · March 12-14,2022, patient was admitted to the hospital with fever, abdominal pain, and no ostomy output  Prior to abdominal pain, had "heavy food" per H&P  He had lack of output as well and was concerned  His WBC was elevated 22K and lactate 2 8  Met SIRS and was admitted  It was thought he likely had inflammation, possibly colitis in setting of ostomy output   He was D/C on Augmentin  General surgery evaluated patient and no intervention was needed as patient regained function of ostomy having stool in bag  He was supposed to be begin mFOLFOX + Avastin March 14th  It was decided he would be pushed back 1 week due to admission  · March 15, 2022, OncotypeMAP showed no therapies with NCCN guideline or FDA approval  See document for details  History of Present Illiness:   hCi Guardian is a 61 y o  male with the above-noted HemOnc history who is here to follow up after hospitalization  Patient states he is anxious to start chemotherapy next week  He states that if he eats too much at once, that is when he has abdominal pain which seems to decrease output in colostomy bag  This is what happened prior to recent hospitalization  He now feels well  Eating and drinking ok, but somewhat afraid to eat too much at once due to abdominal pain  Has lost about 5-6lbs since last visit  Now eating more smaller frequent meals  No other complaints  Patient feels fine  No fever or chills  No exertional chest pain, diaphoresis or shortness  of breath  No cough and phlegm, no hemoptysis  Patient denied nausea  and vomiting  No abdominal pain  No diarrhea or constipation  No  symptoms  No headache or blurred vision  No seizure activity  Appetite good  No significant weight loss or weight gain  The patient denies bleeding anywhere  ROS: A 12-point of review of systems is obtained and other than the above is noncontributory  Objective:   VITALS:   /70 (BP Location: Left arm, Patient Position: Sitting, Cuff Size: Adult)   Pulse (!) 109   Temp 97 9 °F (36 6 °C)   Resp 20   Ht 5' 10" (1 778 m)   Wt 105 kg (232 lb)   SpO2 98%   BMI 33 29 kg/m²     Physical EXAM:  General:  Alert, cooperative, no distress, appears stated age  Head:  Normocephalic, without obvious abnormality, atraumatic  Eyes:  Conjunctivae/corneas clear  PERRL, EOMs intact   No evidence of conjunctivitis     Throat: Lips, mucosa, and tongue normal   No bleeding from mouth  Neck: Supple, symmetrical, trachea midline    Lungs:   Clear to auscultation bilaterally  Respiratory effort easy, nonlabored    Heart:  Regular rate and rhythm, S1, S2 normal, no murmur  Abdomen:   Soft, non-tender,nondistended  Bowel sounds normal  No masses,  No organomegaly  +colostomy bag     Extremities:  Lymphatics: Extremities normal, atraumatic, no cyanosis or edema  No cervical, axillary or inguinal adenopathy   Skin: Skin color, texture, turgor normal  No rashes  Neurologic: A&Ox4  No focal neuro deficits       Allergies   Allergen Reactions    Shellfish-Derived Products - Food Allergy Anaphylaxis    Erythromycin GI Intolerance     Pt denies       Past Medical History:   Diagnosis Date    Acute renal failure ([place] [address] )     [id number] resolved    Diabetes mellitus ([place] [address] )     Enteritis 08/23/2016    Gastroparesis due to DM ([place] [address] ) 08/23/2016    GERD (gastroesophageal reflux disease)     Hernia, ventral 08/04/2016    Hyperlipidemia     Hypertension        Past Surgical History:   Procedure Laterality Date    COLOSTOMY N/A 2/20/2022    Procedure: COLOSTOMY LOOP, diverting;  Surgeon: Nicola Mckeon MD;  Location: [facility];  Service: General    ESOPHAGOGASTRODUODENOSCOPY N/A 8/24/2016    Procedure: ESOPHAGOGASTRODUODENOSCOPY (EGD); Surgeon: Fatou Elias MD;  Location[facility];   Service:     IR PORT PLACEMENT  3/10/2022    KIDNEY STONE SURGERY      LIVER BIOPSY LAPAROSCOPIC N/A 2/20/2022    Procedure: DIAGNOSTIC LAPAROSCOPIC LIVER BIOPSY, DIVERTING LOOP COLOSTOMY ;  Surgeon: Nicola Mckeon MD;  [facility];  Service: General    TONSILLECTOMY      UMBILICAL HERNIA REPAIR      UMBILICAL HERNIA REPAIR LAPAROSCOPIC N/A 4/26/2019    Procedure: LAPAROSCOPIC UMBILICAL HERNIA REPAIR;  Surgeon: Nicola Mckeon MD;  [facility];  Service: General       Family History   Problem Relation Age of Onset    Diabetes Mother         mellitus    Lung cancer Mother     Coronary artery disease Father        Social History     Socioeconomic History    Marital status: /Civil Union     Spouse name: Not on file    Number of children: Not on file    Years of education: Not on file    Highest education level: Not on file   Occupational History    Occupation: ACTOR     Employer: [employer]   Tobacco Use    Smoking status: Never Smoker    Smokeless tobacco: Never Used   Vaping Use    Vaping Use: Never used   Substance and Sexual Activity    Alcohol use: Not Currently     Comment: occasion    Drug use: No    Sexual activity: Yes     Partners: Female   Other Topics Concern    Not on file   Social History Narrative    Not on file     Social Determinants of Health     Financial Resource Strain: Not on file   Food Insecurity: No Food Insecurity    Worried About [address] in the Last Year: Never true    [employer] in the Last Year: Never true   Transportation Needs: No Transportation Needs    Lack of Transportation (Medical): No    Lack of Transportation (Non-Medical): No   Physical Activity: Sufficiently Active    Days of Exercise per Week: 7 days    Minutes of Exercise per Session: 60 min   Stress: No Stress Concern Present    Feeling of Stress : Not at all   Social Connections: Not on file   Intimate Partner Violence: Not on file   Housing Stability: Low Risk     Unable to Pay for Housing in the Last Year: No    Number of Places Lived in the Last Year: 1    Unstable Housing in the Last Year: No       Current Outpatient Medications   Medication Sig Dispense Refill    amLODIPine (NORVASC) 5 mg tablet TAKE 1 TABLET BY MOUTH TWICE A  tablet 0    amoxicillin-clavulanate (AUGMENTIN) 875-125 mg per tablet Take 1 tablet by mouth every 8 (eight) hours for 5 days 15 tablet 0    aspirin 81 MG tablet Take 81 mg by mouth daily        atorvastatin (LIPITOR) 40 mg tablet TAKE 1 TABLET BY MOUTH EVERY DAY 90 tablet 3    B-D UF III MINI PEN NEEDLES 31G X 5 MM MISC BY DOES NOT APPLY ROUTE 4 (FOUR) TIMES A  each 3    Cholecalciferol (VITAMIN D3 PO) Take 400 Units by mouth daily      Continuous Blood Gluc Sensor (FreeStyle Parrish 14 Day Sensor) MISC Use 1 each every 14 (fourteen) days (Patient not taking: Reported on 3/12/2022 ) 2 each 6    [START ON 3/23/2022] fluorouracil 5,425 mg in CADD infusion pump Infuse 5,425 mg (1,200 mg/m2/day x 2 26 m2) into a venous catheter over 46 hours for 2 days  Do not start before March 23, 2022  1 each 0    glyBURIDE-metFORMIN (GLUCOVANCE) 5-500 MG per tablet Take 1 tablet by mouth daily with breakfast      insulin glargine (Lantus) 100 units/mL subcutaneous injection Inject 10 Units under the skin daily at bedtime (Patient taking differently: Inject 20 Units under the skin daily at bedtime Pt increased his insulin to 20 units ) 10 mL 0    Januvia 100 MG tablet TAKE 1 TABLET BY MOUTH EVERY DAY 90 tablet 3    lisinopril (ZESTRIL) 40 mg tablet TAKE 1 TABLET BY MOUTH EVERY DAY 90 tablet 3    metoclopramide (REGLAN) 10 mg tablet Take 1 tablet (10 mg total) by mouth 2 (two) times a day 180 tablet 0    Multiple Vitamins-Minerals (multivitamin with minerals) tablet Take 1 tablet by mouth daily      Needle, Disp, (HYPODERMIC NEEDLE) 23G X 1-1/2" MISC by Does not apply route once a week 10 each 6    ondansetron (ZOFRAN) 4 mg tablet Take 4 mg by mouth every 6 (six) hours as needed      Ostomy Supplies MISC Use in the morning 100 each 3    pantoprazole (PROTONIX) 40 mg tablet TAKE 1 TABLET BY MOUTH TWICE A  tablet 3    saccharomyces boulardii (FLORASTOR) 250 mg capsule Take 1 capsule (250 mg total) by mouth 2 (two) times a day for 5 days 10 capsule 0     No current facility-administered medications for this visit         (Not in a hospital admission)      DATA REVIEW:    Pathology Result:    Final Diagnosis   Date Value Ref Range Status 02/20/2022   Final    A  Liver, biopsy:  - Adenocarcinoma, consistent with colorectal primary  Comment: Immunohistochemistry was performed on block A1  The tumor cells are positive for CK20, SATB2, CDX2,  and negative for TTF-1, NKX3 1, Ck7; DPC-4 shows no loss, supporting the above diagnosis  Dr Jada Dickerson is notified of the diagnosis in [facility] via [employer] on 2/24/2022  at 11:36 am    Immunohistochemistry [facility]) testing for Mismatch Repair (MMR) proteins has been requested on block A1, and the results will be issued in an addendum  08/24/2016   Final    A  Small bowel (biopsy):  - Duodenal mucosa with no diagnostic abnormality  - No Marsh lesion  - Negative for dysplasia     B  Stomach, body (biopsy):  - Chronic inactive oxyntic gastritis  - No H pylori identified on immunohistochemistry stain  - No intestinal metaplasia or dysplasia    C  Distal esophagus (biopsy):  - Cardiac-type mucosa with intestinal metaplasia   - Negative for dysplasia     Comment: This biopsy shows gastric-type mucosa with scattered goblet cells  The diagnosis in this case depends on the location of this biopsy  If this biopsy was taken from the tubular esophagus, it represents Esparza mucosa of the distinctive type  If this biopsy was taken from the gastric cardia, it represents intestinal metaplasia of the gastric cardia  Interpretation performed at [facility], [address] [place] [address]            Image Results: They are reviewed and documented in Hematology/Oncology history    XR chest pa & lateral  Narrative: CHEST     INDICATION:   fever r/o pna  COMPARISON:  Chest CT 2/19/2022    EXAM PERFORMED/VIEWS:  XR CHEST PA & LATERAL  The frontal view was performed utilizing dual energy radiographic technique  Images: 5    FINDINGS:  Right chest wall port, tubing tip at the cavoatrial junction  Cardiomediastinal silhouette appears unremarkable  The lungs are clear    No pneumothorax or pleural effusion  Osseous structures appear within normal limits for patient age  Hiatal hernia  Impression: No acute cardiopulmonary disease  Workstation performed: [id number]  CT abdomen pelvis with contrast  Narrative: CT ABDOMEN AND PELVIS WITH IV CONTRAST    INDICATION:   recent colostomy, no output or gas since eating lunch several hours ago eval obstruction  COMPARISON:  CT abdomen/pelvis dated February 18, 2022  TECHNIQUE:  CT examination of the abdomen and pelvis was performed  In addition to portal venous phase postcontrast scanning through the abdomen and pelvis, delayed phase postcontrast scanning was performed through the upper abdominal viscera  Axial,   sagittal, and coronal 2D reformatted images were created from the source data and submitted for interpretation  Radiation dose length product (DLP) for this visit:  1875 mGy-cm   This examination, like all CT scans performed in the [facility], was performed utilizing techniques to minimize radiation dose exposure, including the use of iterative   reconstruction and automated exposure control  IV Contrast:  100 mL of iohexol (OMNIPAQUE)  Enteric Contrast:  Enteric contrast was not administered  FINDINGS:    ABDOMEN    LOWER CHEST:  No clinically significant abnormality identified in the visualized lower chest     LIVER/BILIARY TREE:  Again noted are innumerable hypodense lesions compatible with metastatic disease, similar to the prior exam   No intrahepatic biliary ductal dilatation  No portal vein thrombosis  GALLBLADDER:  There are gallstone(s) within the gallbladder, without pericholecystic inflammatory changes  SPLEEN:  The liver is enlarged measuring 14 6 cm in length  PANCREAS:  Unremarkable  ADRENAL GLANDS:  Unremarkable  KIDNEYS/URETERS:  One or more simple renal cyst(s) is noted  No hydronephrosis  One or more sharply circumscribed subcentimeter renal hypodensities are noted   These lesions are too small to accurately characterize, but are statistically most likely to represent benign cortical renal cyst(s)  According to the guidelines published in   the [facility] Paper of the ACR Incidental Findings Committee (Radiology 2010), no further workup of these lesions is recommended  STOMACH AND BOWEL:  Postoperative changes of recent right mid abdominal diverting colostomy are noted  There is no evidence of large or small bowel obstruction  Again noted is an apple core lesion at the distal transverse colon which measures   approximately 5 cm in length  APPENDIX:  No findings to suggest appendicitis  ABDOMINOPELVIC CAVITY:  There is a small amount of intra-abdominal and pelvic ascites  No pneumoperitoneum  Although there is no lymphadenopathy, there are a few nonenlarged lymph nodes adjacent to the patient's distal transverse colonic mass similar   to the prior exam and suspicious for metastatic disease  VESSELS:  Unremarkable for patient's age  PELVIS    REPRODUCTIVE ORGANS:  Unremarkable for patient's age  URINARY BLADDER:  Unremarkable  ABDOMINAL WALL/INGUINAL REGIONS:  Unremarkable  OSSEOUS STRUCTURES:  No acute fracture or destructive osseous lesion  Impression: Postoperative changes of recent right mid abdominal diverting colostomy  There is no evidence of obstruction  The small bowel loops are of normal caliber  Small amount of intra-abdominal and pelvic ascites  No free intraperitoneal air  Innumerable hepatic metastases similar to the prior exam     Reidentified apple core cortical lesion at the distal transverse colon  Workstation performed: [id number]        LABS:  Lab data are reviewed and documented in HemOnc history  No results found for this or any previous visit (from the past 48 hour(s))            Anushka Morris PA-C  3/17/2022, 4:12 PM

## 2022-03-17 ENCOUNTER — OFFICE VISIT (OUTPATIENT)
Dept: HEMATOLOGY ONCOLOGY | Facility: CLINIC | Age: 57
End: 2022-03-17
Payer: COMMERCIAL

## 2022-03-17 VITALS
BODY MASS INDEX: 33.21 KG/M2 | SYSTOLIC BLOOD PRESSURE: 132 MMHG | TEMPERATURE: 97.9 F | DIASTOLIC BLOOD PRESSURE: 70 MMHG | WEIGHT: 232 LBS | HEART RATE: 109 BPM | OXYGEN SATURATION: 98 % | RESPIRATION RATE: 20 BRPM | HEIGHT: 70 IN

## 2022-03-17 DIAGNOSIS — C18.4 MALIGNANT NEOPLASM OF TRANSVERSE COLON (HCC): Primary | ICD-10-CM

## 2022-03-17 LAB
BACTERIA BLD CULT: NORMAL
BACTERIA BLD CULT: NORMAL

## 2022-03-17 PROCEDURE — 99213 OFFICE O/P EST LOW 20 MIN: CPT | Performed by: INTERNAL MEDICINE

## 2022-03-17 PROCEDURE — 3008F BODY MASS INDEX DOCD: CPT | Performed by: INTERNAL MEDICINE

## 2022-03-17 PROCEDURE — 1111F DSCHRG MED/CURRENT MED MERGE: CPT | Performed by: INTERNAL MEDICINE

## 2022-03-18 ENCOUNTER — OFFICE VISIT (OUTPATIENT)
Dept: INTERNAL MEDICINE CLINIC | Facility: CLINIC | Age: 57
End: 2022-03-18
Payer: COMMERCIAL

## 2022-03-18 VITALS
TEMPERATURE: 97.5 F | BODY MASS INDEX: 33.17 KG/M2 | HEART RATE: 110 BPM | SYSTOLIC BLOOD PRESSURE: 130 MMHG | DIASTOLIC BLOOD PRESSURE: 74 MMHG | WEIGHT: 231.2 LBS | OXYGEN SATURATION: 97 %

## 2022-03-18 DIAGNOSIS — R65.10 SIRS (SYSTEMIC INFLAMMATORY RESPONSE SYNDROME) (HCC): Primary | ICD-10-CM

## 2022-03-18 DIAGNOSIS — E11.9 TYPE 2 DIABETES MELLITUS WITHOUT COMPLICATION, WITHOUT LONG-TERM CURRENT USE OF INSULIN (HCC): ICD-10-CM

## 2022-03-18 DIAGNOSIS — I10 BENIGN ESSENTIAL HYPERTENSION: ICD-10-CM

## 2022-03-18 DIAGNOSIS — C18.4 MALIGNANT NEOPLASM OF TRANSVERSE COLON (HCC): ICD-10-CM

## 2022-03-18 PROBLEM — Z48.89 POSTOPERATIVE VISIT: Status: RESOLVED | Noted: 2022-03-02 | Resolved: 2022-03-18

## 2022-03-18 PROBLEM — R10.9 ABDOMINAL PAIN: Status: RESOLVED | Noted: 2022-03-12 | Resolved: 2022-03-18

## 2022-03-18 PROCEDURE — 1111F DSCHRG MED/CURRENT MED MERGE: CPT | Performed by: INTERNAL MEDICINE

## 2022-03-18 PROCEDURE — 99215 OFFICE O/P EST HI 40 MIN: CPT | Performed by: INTERNAL MEDICINE

## 2022-03-18 NOTE — PROGRESS NOTES
Assessment/Plan:     No problem-specific Assessment & Plan notes found for this encounter  Diagnoses and all orders for this visit:    SIRS (systemic inflammatory response syndrome) (HCC)   continue antibiotics till complete  Malignant neoplasm of transverse colon [facility])  Follow-up with Oncology, is starting with chemotherapy next week    Type 2 diabetes mellitus without complication, without long-term current use of insulin (HCC)  -     Hemoglobin A1C; Future   was advised to continue the same dose of insulin and the oral medications for now  If his blood glucose levels remain high, he was instructed to increase the dose to 22 units  Benign essential hypertension  Continue present medications       Subjective:     Patient ID: Judy Cervantes is a 61 y o  male  HPI   patient was recently diagnosed with metastatic colon cancer  Last week he had abdominal pain and the ostomy stopped working  He went to the hospital and was admitted for a couple of days  He was found to have sepsis and was treated with broad-spectrum antibiotics  He is almost done with his antibiotics now and is feeling much better  Review of Systems   Constitutional: Positive for fatigue  Negative for chills, diaphoresis and fever  HENT: Negative for congestion, ear discharge, ear pain, hearing loss, postnasal drip, rhinorrhea, sinus pressure, sinus pain, sneezing, sore throat and voice change  Eyes: Negative for pain, discharge, redness and visual disturbance  Respiratory: Negative for cough, chest tightness, shortness of breath and wheezing  Cardiovascular: Negative for chest pain, palpitations and leg swelling  Gastrointestinal: Negative for abdominal distention, abdominal pain, blood in stool, constipation, diarrhea, nausea and vomiting  Endocrine: Negative for cold intolerance, heat intolerance, polydipsia, polyphagia and polyuria     Genitourinary: Negative for dysuria, flank pain, frequency, hematuria and urgency  Musculoskeletal: Negative for arthralgias, back pain, gait problem, joint swelling, myalgias, neck pain and neck stiffness  Skin: Negative for rash  Neurological: Negative for dizziness, tremors, syncope, facial asymmetry, speech difficulty, weakness, light-headedness, numbness and headaches  Hematological: Does not bruise/bleed easily  Psychiatric/Behavioral: Positive for dysphoric mood  Negative for behavioral problems, confusion and sleep disturbance  The patient is nervous/anxious  Objective:     Physical Exam  Constitutional:       General: He is not in acute distress  Appearance: He is well-developed  He is not diaphoretic  HENT:      Head: Normocephalic and atraumatic  Right Ear: External ear normal       Left Ear: External ear normal       Nose: Nose normal    Eyes:      General: No scleral icterus  Right eye: No discharge  Left eye: No discharge  Conjunctiva/sclera: Conjunctivae normal    Neck:      Thyroid: No thyromegaly  Vascular: No JVD  Trachea: No tracheal deviation  Cardiovascular:      Rate and Rhythm: Normal rate and regular rhythm  Heart sounds: Normal heart sounds  No murmur heard  No friction rub  No gallop  Pulmonary:      Effort: Pulmonary effort is normal  No respiratory distress  Breath sounds: Normal breath sounds  No wheezing or rales  Chest:      Chest wall: No tenderness  Abdominal:      General: Bowel sounds are normal  There is no distension  Palpations: Abdomen is soft  Tenderness: There is no abdominal tenderness  There is no guarding or rebound  Musculoskeletal:         General: No tenderness  Normal range of motion  Cervical back: Normal range of motion and neck supple  Lymphadenopathy:      Cervical: No cervical adenopathy  Skin:     General: Skin is warm and dry  Findings: No erythema or rash     Neurological:      Mental Status: He is alert and oriented to person, place, and time  Cranial Nerves: No cranial nerve deficit  Motor: No abnormal muscle tone  Coordination: Coordination normal    Psychiatric:         Judgment: Judgment normal       Comments: Seems depressed           Vitals:    03/18/22 1110   BP: 130/74   BP Location: Left arm   Patient Position: Sitting   Cuff Size: Standard   Pulse: (!) 110   Temp: 97 5 °F (36 4 °C)   TempSrc: Tympanic   SpO2: 97%   Weight: 105 kg (231 lb 3 2 oz)       Transitional Care Management Review:  Sandy Sepulveda is a 61 y o  male here for TCM follow up  During the TCM phone call patient stated:    TCM Call (since 2/15/2022)     Date and time call was made  3/15/2022 10:50 AM    Hospital care reviewed  Records reviewed        Patient [facility]ial[facility] at  [facility]        Date of Admission  03/12/22    Date of discharge  03/14/22    Diagnosis  s/p colostomy  abd pain, fever    Disposition  Home    Current Symptoms  Fatigue  appetite good, tolerated  Passing stool thru ostomy    Fatigue severity  Mild      TCM Call (since 2/15/2022)     Post hospital issues  None    Scheduled for follow up?   Yes    Patients specialists  Other (comment)    Other specialists names  Hem/[facility] Preciado, [phone] / Lanre Hathaway MD, [phone]    Do you need help managing your prescriptions or medications  No    Is transportation to your appointment needed  No    I have advised the patient to call PCP with any new or worsening symptoms  King Sergio LPN    Are you recieving any outpatient services  Yes    What type of services  Oncology Treatment, Infusion Therapy    Are you recieving home care services  Yes    Types of home care services  Nurse visit          Maggie Jimenez MD

## 2022-03-20 LAB
ALBUMIN SERPL-MCNC: 3 G/DL (ref 3.8–4.9)
ALBUMIN/GLOB SERPL: 0.9 {RATIO} (ref 1.2–2.2)
ALP SERPL-CCNC: 598 IU/L (ref 44–121)
ALT SERPL-CCNC: 20 IU/L (ref 0–44)
AST SERPL-CCNC: 55 IU/L (ref 0–40)
BASOPHILS # BLD AUTO: 0.1 X10E3/UL (ref 0–0.2)
BASOPHILS NFR BLD AUTO: 1 %
BILIRUB SERPL-MCNC: 0.7 MG/DL (ref 0–1.2)
BUN SERPL-MCNC: 10 MG/DL (ref 6–24)
BUN/CREAT SERPL: 10 (ref 9–20)
CALCIUM SERPL-MCNC: 9 MG/DL (ref 8.7–10.2)
CHLORIDE SERPL-SCNC: 95 MMOL/L (ref 96–106)
CO2 SERPL-SCNC: 20 MMOL/L (ref 20–29)
CREAT SERPL-MCNC: 1.03 MG/DL (ref 0.76–1.27)
EGFR: 85 ML/MIN/1.73
EOSINOPHIL # BLD AUTO: 0 X10E3/UL (ref 0–0.4)
EOSINOPHIL NFR BLD AUTO: 0 %
ERYTHROCYTE [DISTWIDTH] IN BLOOD BY AUTOMATED COUNT: 17.7 % (ref 11.6–15.4)
GLOBULIN SER-MCNC: 3.2 G/DL (ref 1.5–4.5)
GLUCOSE SERPL-MCNC: 145 MG/DL (ref 65–99)
HCT VFR BLD AUTO: 27.8 % (ref 37.5–51)
HGB BLD-MCNC: 8.6 G/DL (ref 13–17.7)
IMM GRANULOCYTES # BLD: 0.1 X10E3/UL (ref 0–0.1)
IMM GRANULOCYTES NFR BLD: 1 %
LYMPHOCYTES # BLD AUTO: 1.8 X10E3/UL (ref 0.7–3.1)
LYMPHOCYTES NFR BLD AUTO: 12 %
MCH RBC QN AUTO: 22.8 PG (ref 26.6–33)
MCHC RBC AUTO-ENTMCNC: 30.9 G/DL (ref 31.5–35.7)
MCV RBC AUTO: 74 FL (ref 79–97)
MONOCYTES # BLD AUTO: 1.4 X10E3/UL (ref 0.1–0.9)
MONOCYTES NFR BLD AUTO: 9 %
NEUTROPHILS # BLD AUTO: 11.6 X10E3/UL (ref 1.4–7)
NEUTROPHILS NFR BLD AUTO: 77 %
PLATELET # BLD AUTO: 777 X10E3/UL (ref 150–450)
POTASSIUM SERPL-SCNC: 4.4 MMOL/L (ref 3.5–5.2)
PROT SERPL-MCNC: 6.2 G/DL (ref 6–8.5)
RBC # BLD AUTO: 3.77 X10E6/UL (ref 4.14–5.8)
SODIUM SERPL-SCNC: 134 MMOL/L (ref 134–144)
WBC # BLD AUTO: 14.9 X10E3/UL (ref 3.4–10.8)

## 2022-03-21 ENCOUNTER — TELEPHONE (OUTPATIENT)
Dept: HEMATOLOGY ONCOLOGY | Facility: CLINIC | Age: 57
End: 2022-03-21

## 2022-03-21 DIAGNOSIS — D50.0 IRON DEFICIENCY ANEMIA DUE TO CHRONIC BLOOD LOSS: Primary | ICD-10-CM

## 2022-03-21 RX ORDER — SODIUM CHLORIDE 9 MG/ML
20 INJECTION, SOLUTION INTRAVENOUS ONCE
Status: CANCELLED | OUTPATIENT
Start: 2022-03-23

## 2022-03-21 RX ORDER — FLUOROURACIL 50 MG/ML
400 INJECTION, SOLUTION INTRAVENOUS ONCE
Status: CANCELLED | OUTPATIENT
Start: 2022-04-06

## 2022-03-21 RX ORDER — DEXTROSE MONOHYDRATE 50 MG/ML
20 INJECTION, SOLUTION INTRAVENOUS ONCE
Status: CANCELLED | OUTPATIENT
Start: 2022-04-06

## 2022-03-21 RX ORDER — SODIUM CHLORIDE 9 MG/ML
20 INJECTION, SOLUTION INTRAVENOUS ONCE
Status: CANCELLED | OUTPATIENT
Start: 2022-04-06

## 2022-03-21 NOTE — TELEPHONE ENCOUNTER
Insurance requesting Avastin change to MVASI  Treatment 3/23/22    Plan updated  Sending to [facility] infusion to f/u with pharmacy to see if drug is available

## 2022-03-22 ENCOUNTER — TELEPHONE (OUTPATIENT)
Dept: HEMATOLOGY ONCOLOGY | Facility: CLINIC | Age: 57
End: 2022-03-22

## 2022-03-22 NOTE — TELEPHONE ENCOUNTER
Received telephone call from patient asking whether or not patient should or should not eat breakfast prior to infusion  Pt confirmed date and time for infusion on 3/23/22  Pt stated that it will be his first treatment on 3/23/22  Reviewed with patient that we encourage patient to stay hydrated and to eat prior to and during infusion  Pt verbalized understanding

## 2022-03-23 ENCOUNTER — HOSPITAL ENCOUNTER (OUTPATIENT)
Dept: INFUSION CENTER | Facility: CLINIC | Age: 57
Discharge: HOME | End: 2022-03-23
Payer: COMMERCIAL

## 2022-03-23 ENCOUNTER — DOCUMENTATION (OUTPATIENT)
Dept: NUTRITION | Facility: CLINIC | Age: 57
End: 2022-03-23

## 2022-03-23 VITALS
BODY MASS INDEX: 34.32 KG/M2 | SYSTOLIC BLOOD PRESSURE: 129 MMHG | DIASTOLIC BLOOD PRESSURE: 79 MMHG | HEART RATE: 103 BPM | HEIGHT: 69 IN | WEIGHT: 231.7 LBS | RESPIRATION RATE: 20 BRPM | TEMPERATURE: 98.3 F

## 2022-03-23 DIAGNOSIS — C18.4 MALIGNANT NEOPLASM OF TRANSVERSE COLON (HCC): Primary | ICD-10-CM

## 2022-03-23 DIAGNOSIS — D50.0 IRON DEFICIENCY ANEMIA DUE TO CHRONIC BLOOD LOSS: ICD-10-CM

## 2022-03-23 PROCEDURE — 96411 CHEMO IV PUSH ADDL DRUG: CPT

## 2022-03-23 PROCEDURE — 96415 CHEMO IV INFUSION ADDL HR: CPT

## 2022-03-23 PROCEDURE — 96368 THER/DIAG CONCURRENT INF: CPT

## 2022-03-23 PROCEDURE — 96367 TX/PROPH/DG ADDL SEQ IV INF: CPT

## 2022-03-23 PROCEDURE — 96413 CHEMO IV INFUSION 1 HR: CPT

## 2022-03-23 PROCEDURE — G0498 CHEMO EXTEND IV INFUS W/PUMP: HCPCS

## 2022-03-23 PROCEDURE — 96417 CHEMO IV INFUS EACH ADDL SEQ: CPT

## 2022-03-23 RX ORDER — SODIUM CHLORIDE 9 MG/ML
20 INJECTION, SOLUTION INTRAVENOUS ONCE
Status: COMPLETED | OUTPATIENT
Start: 2022-03-23 | End: 2022-03-23

## 2022-03-23 RX ORDER — SODIUM CHLORIDE 9 MG/ML
20 INJECTION, SOLUTION INTRAVENOUS ONCE
Status: CANCELLED | OUTPATIENT
Start: 2022-03-30

## 2022-03-23 RX ORDER — DEXTROSE MONOHYDRATE 50 MG/ML
20 INJECTION, SOLUTION INTRAVENOUS ONCE
Status: COMPLETED | OUTPATIENT
Start: 2022-03-23 | End: 2022-03-23

## 2022-03-23 RX ORDER — FLUOROURACIL 50 MG/ML
400 INJECTION, SOLUTION INTRAVENOUS ONCE
Status: COMPLETED | OUTPATIENT
Start: 2022-03-23 | End: 2022-03-23

## 2022-03-23 RX ADMIN — SODIUM CHLORIDE 20 ML/HR: 9 INJECTION, SOLUTION INTRAVENOUS at 08:32

## 2022-03-23 RX ADMIN — IRON SUCROSE 300 MG: 20 INJECTION, SOLUTION INTRAVENOUS at 08:45

## 2022-03-23 RX ADMIN — DEXTROSE 20 ML/HR: 5 SOLUTION INTRAVENOUS at 13:20

## 2022-03-23 RX ADMIN — DEXAMETHASONE SODIUM PHOSPHATE: 10 INJECTION, SOLUTION INTRAMUSCULAR; INTRAVENOUS at 10:26

## 2022-03-23 RX ADMIN — FLUOROURACIL 905 MG: 50 INJECTION, SOLUTION INTRAVENOUS at 15:29

## 2022-03-23 RX ADMIN — LEUCOVORIN CALCIUM 900 MG: 500 INJECTION, POWDER, LYOPHILIZED, FOR SOLUTION INTRAMUSCULAR; INTRAVENOUS at 13:20

## 2022-03-23 RX ADMIN — BEVACIZUMAB-AWWB 545 MG: 400 INJECTION, SOLUTION INTRAVENOUS at 11:18

## 2022-03-23 RX ADMIN — SODIUM CHLORIDE 20 ML/HR: 9 INJECTION, SOLUTION INTRAVENOUS at 10:24

## 2022-03-23 RX ADMIN — OXALIPLATIN 192.1 MG: 5 INJECTION, SOLUTION INTRAVENOUS at 13:20

## 2022-03-23 NOTE — PROGRESS NOTES
Pt to clinic for FOLFOX plus MVASI, offers no complaints today, tolerated infusions and CADD pump hook-up without complications, pump reading "running" and blinking green prior to discharge, pt aware of when to return for CADD pump disconnect, avs printed and reviewed

## 2022-03-24 ENCOUNTER — PATIENT OUTREACH (OUTPATIENT)
Dept: CASE MANAGEMENT | Facility: HOSPITAL | Age: 57
End: 2022-03-24

## 2022-03-24 NOTE — PROGRESS NOTES
MSW s/w pt by phone today, he started his chemo tx yesterday at the [facility] and tells me that things went very well for him overall  He said that he was very anxious going in and not having any idea what to expect, but says that "I started to weep out of sheer relief that I was finally getting started and that it wasn't horrible"  He says that other than overeating yesterday d/t steroids, he is doing well at home today  He says that he did have a stomach ache yesterday and has noticed slower output in his colostomy bag, but feels that he is hydrating enough and is focusing today on having smoothies, yogurt, etc to help that process  He shared that he is working on his SSD application and had a few general questions about that, which I was able to answer for him today  I explained that I expected to be with him in the infusion center yesterday but was ill and didn't want to expose anyone, however I would be meeting him in the office soon which he was appreciative of  His main focus at this point is getting his disability application approved, and says that he will consult a  if need be  I explained that I also am happy to assist as needed moving forward, which he is appreciative of  We agreed to meet in the office next week, no other needs or concerns at this time 
Hemostasis: Aluminum Chloride
Detail Level: Detailed
Include Z78.9 (Other Specified Conditions Influencing Health Status) As An Associated Diagnosis?: Yes
Medical Necessity Information: It is in your best interest to select a reason for this procedure from the list below. All of these items fulfill various CMS LCD requirements except the new and changing color options.
Consent: Written consent obtained and the risks of skin tag removal was reviewed with the patient including but not limited to bleeding, pigmentary change, infection, pain, and remote possibility of scarring.
Medical Necessity Clause: This procedure was medically necessary because the lesions that were treated were:
Anesthesia Volume In Cc: 0

## 2022-03-25 ENCOUNTER — TELEPHONE (OUTPATIENT)
Dept: SURGICAL ONCOLOGY | Facility: CLINIC | Age: 57
End: 2022-03-25

## 2022-03-25 ENCOUNTER — HOSPITAL ENCOUNTER (OUTPATIENT)
Dept: INFUSION CENTER | Facility: CLINIC | Age: 57
Discharge: HOME | End: 2022-03-25

## 2022-03-25 DIAGNOSIS — C18.4 MALIGNANT NEOPLASM OF TRANSVERSE COLON (HCC): Primary | ICD-10-CM

## 2022-03-25 NOTE — PROGRESS NOTES
Pt here for CADD pump disconnect  Pt tolerated at home infusion  Res volume is 0ml  Port flushed and blood return noted and was de accessed  Pt aware of future appts  Pt declined AVS Pt offered no concerns or complaints  States that he has zofran at home if needed

## 2022-03-25 NOTE — TELEPHONE ENCOUNTER
03/25/2022 Spoke to patient for authorization and financial to look for funding for his treatment medications  Rubens Grippe, and ADRUCIL  He did not have his 2021 financial available so we agreed to speak again on Monday 03/28/22  He thanked me for the call  04/01/2022 called patient and received the financial information to look for funding, Then I was able to enroll the patient for a Co-Pay card with [employer] for his MVASI  The details of the card are as follows:    ID NUMBER:                      [id number] [id number]  Start Date:                         04/01/2022  Expires:                             04/01/2023  Look Back Date:             180 days (10/03/2021)    Credit Card Number: [facility] [id number]       Expiration Date:                   02/25                       Balance:                              $20,000 00  After the first use of the card ($0 00)   There will be a $5 00 Co-Pay from then on  Any bills for your MVASI can be forwarded to us to submit claims  He was very happy with any help we could give him and thanked me for the call

## 2022-03-29 RX ORDER — DEXTROSE MONOHYDRATE 50 MG/ML
20 INJECTION, SOLUTION INTRAVENOUS ONCE
Status: CANCELLED | OUTPATIENT
Start: 2022-04-20

## 2022-03-29 RX ORDER — FLUOROURACIL 50 MG/ML
400 INJECTION, SOLUTION INTRAVENOUS ONCE
Status: CANCELLED | OUTPATIENT
Start: 2022-04-20

## 2022-03-29 RX ORDER — SODIUM CHLORIDE 9 MG/ML
20 INJECTION, SOLUTION INTRAVENOUS ONCE
Status: CANCELLED | OUTPATIENT
Start: 2022-04-20

## 2022-03-29 RX ORDER — FLUOROURACIL 50 MG/ML
400 INJECTION, SOLUTION INTRAVENOUS ONCE
Status: CANCELLED | OUTPATIENT
Start: 2022-04-06

## 2022-03-30 ENCOUNTER — NUTRITION (OUTPATIENT)
Dept: NUTRITION | Facility: CLINIC | Age: 57
End: 2022-03-30

## 2022-03-30 ENCOUNTER — PATIENT OUTREACH (OUTPATIENT)
Dept: CASE MANAGEMENT | Facility: HOSPITAL | Age: 57
End: 2022-03-30

## 2022-03-30 ENCOUNTER — HOSPITAL ENCOUNTER (OUTPATIENT)
Dept: INFUSION CENTER | Facility: CLINIC | Age: 57
Discharge: HOME | End: 2022-03-30
Payer: COMMERCIAL

## 2022-03-30 VITALS
RESPIRATION RATE: 18 BRPM | SYSTOLIC BLOOD PRESSURE: 132 MMHG | TEMPERATURE: 97 F | HEART RATE: 94 BPM | DIASTOLIC BLOOD PRESSURE: 80 MMHG

## 2022-03-30 DIAGNOSIS — Z71.3 NUTRITIONAL COUNSELING: Primary | ICD-10-CM

## 2022-03-30 DIAGNOSIS — D50.0 IRON DEFICIENCY ANEMIA DUE TO CHRONIC BLOOD LOSS: Primary | ICD-10-CM

## 2022-03-30 DIAGNOSIS — C18.4 MALIGNANT NEOPLASM OF TRANSVERSE COLON (HCC): Primary | ICD-10-CM

## 2022-03-30 PROCEDURE — 96365 THER/PROPH/DIAG IV INF INIT: CPT

## 2022-03-30 RX ORDER — SODIUM CHLORIDE 9 MG/ML
20 INJECTION, SOLUTION INTRAVENOUS ONCE
Status: COMPLETED | OUTPATIENT
Start: 2022-03-30 | End: 2022-03-30

## 2022-03-30 RX ORDER — SODIUM CHLORIDE 9 MG/ML
20 INJECTION, SOLUTION INTRAVENOUS ONCE
Status: CANCELLED | OUTPATIENT
Start: 2022-04-06

## 2022-03-30 RX ADMIN — SODIUM CHLORIDE 20 ML/HR: 9 INJECTION, SOLUTION INTRAVENOUS at 12:55

## 2022-03-30 RX ADMIN — IRON SUCROSE 300 MG: 20 INJECTION, SOLUTION INTRAVENOUS at 12:55

## 2022-03-30 NOTE — PATIENT INSTRUCTIONS
Nutrition Rx & Recommendations:  · Diet: High Calorie, High Protein (for high calorie foods see pages 52-53, and for high protein foods see pages 49-51 in your Eating Hints book)  · Keep a daily food journal (pen/paper, bossman such as [employer])  · Small, frequent meals/snacks may be easier to tolerate than 3 large daily meals  Aim for 5-6 small meals per day (every 2-3 hours)  · Include protein at all meals/snacks  · Include a variety of foods (as tolerated/allowed by diet)  · Stay hydrated by sipping fluids of choice/tolerance throughout the day  · Alter food choices and eating patterns to accommodate changing needs  · Weigh yourself regularly  If you notice weight loss, make an effort to increase your daily food/calorie intake  If you continue to notice loss after these efforts, reach out to your dietitian to establish a plan to stabilize weight  · Avoid gas-producing foods such as broccoli, cabbage, sauerkraut, [place] sprouts, cauliflower, corn, cucumbers, onions, peppers, beans, peas, lentils, apples, apple juice, avocado, and melons  Carbonated drinks, chewing gum, and drinking through a straw can also cause gas  Limiting lactose may help for those who are sensitive to milk products  · Spread carbohydrate intake throughout the day for better glycemic control  · Continue Premier Protein daily  · For extra calories: add extra sauces/gravies, keep non perishable snacks nearby, choose liquids with calories, add extra cream cheese/cheese to foods as tolerated  Nutrition after colostomy placement:    Diet guidelines for the first 6 weeks:  Eat 5-6 smaller and more frequent meals throughout the day  Try not to skip meals, skipping meals can cause gas  Have your largest meal in the middle of the day  Eating a lot in the evening can cause higher stool output overnight  Choose low fiber foods  Try to eat less that 10g of fiber per day   After you have healed from surgery you can gradually add fiber back into your diet  Chew your foods well  Avoid spicy, acidic, fried, and greasy foods  These foods are harder to digest and can cause discomfort  Limit simple sugars such as desserts and sugar sweetened beverages  Foods high in added sugars can cause diarrhea  Stay hydrated with at least 8-10 cups of liquid daily  Drink liquids 30 minutes after meals to avoid flushing foods through too quickly  If ostomy output is higher than usual, try to drink more fluids to avoid dehydration  After 6 weeks:  After 6 weeks post surgery you can start adding foods that you have been avoiding and back into your diet  Add new foods slowly, one at a time, and chew them well  Food choices and their effects:   Foods that may cause diarrhea or increased output: fresh or raw fruit, fresh or raw vegetables, fried or spicy foods, high sugar beverages, legumes, milk and dairy foods, prune juice, sugar alcohols (sorbitol, mannitol, xylitol)  Foods that may help thicken stool: applesauce, bananas, cheese, marshmallows, oatmeal, creamy peanut butter, potatoes (no skin), tapioca pudding, white bread, pretzels, saltine crackers, white rice, white pasta/noodles, yogurt  Foods that may cause gas or odor: eggs, onions, peanuts, legumes, carbonated drinks, cruciferous vegetables (Brussel's sprouts, cauliflower, broccoli, cabbage), chewing gum  Foods that may help relieve gas and odor: buttermilk, cranberry juice, parsley, yogurt with active cultures  See "Diet and Nutrition After Colostomy" handout for additional suggestions on foods to choose and foods to avoid/limit after ostomy placement           Follow Up Plan: During infusion 4/6/22  Recommend Referral to Other Providers: none at this time

## 2022-03-30 NOTE — PROGRESS NOTES
Outpatient Oncology Nutrition Consultation   Type of Consult: Follow Up  Care Location: [place] [address] w/pt & wife Ceasar Garcia)    Reason for referral: From Infusion RN on 3/23/22 (reason for referral: Malnutrition Screening Tool (MST) Triggers: scored a 4 indicating >/=34# (>/=15 4 kg) recent wt loss and is eating poorly due to a decreased appetite  Date of MST: 3/23/22 )    Nutrition Assessment:   Oncology Diagnosis & Treatments: Diagnosed with colon cancer 3/1/22  · S/p colostomy 2/20/22  · Began chemotherapy (adrucil, oxaliplatin, mvasi) 3/23/22      Oncology History   Malignant neoplasm of transverse colon ([place] [address] )   3/1/2022 Initial Diagnosis    Malignant neoplasm of transverse colon ([place] [address] )     3/23/2022 -  Chemotherapy    fluorouracil (ADRUCIL), 400 mg/m2 = 905 mg, Intravenous, Once, 1 of 6 cycles  Administration: 905 mg (3/23/2022)  leucovorin calcium IVPB, 904 mg, Intravenous, Once, 1 of 6 cycles  Administration: 900 mg (3/23/2022)  oxaliplatin (ELOXATIN) chemo infusion, 85 mg/m2 = 192 1 mg, Intravenous, Once, 1 of 6 cycles  Administration: 192 1 mg (3/23/2022)  fluorouracil (ADRUCIL) ambulatory infusion Soln, 1,200 mg/m2/day = 5,425 mg, Intravenous, Over 46 hours, 1 of 6 cycles  bevacizumab-awwb (MVASI) IVPB, 5 mg/kg = 545 mg (100 % of original dose 5 mg/kg), Intravenous, Once, 1 of 6 cycles  Dose modification: 5 mg/kg (original dose 5 mg/kg, Cycle 1)  Administration: 545 mg (3/23/2022)       Past Medical & Surgical Hx: T2DM, gastroparesis, HLD, CKD, GERD,   Patient Active Problem List   Diagnosis    Type 2 diabetes mellitus without complication, without long-term current use of insulin (HCC)    Benign essential hypertension    Mixed hyperlipidemia    Erectile dysfunction    Low testosterone    Testicular hypogonadism    Morbid obesity ([place] [address] )    Incarcerated umbilical hernia    Left ureteral calculus    Type 2 diabetes mellitus with hyperlipidemia ([place] [address] )    Colonic mass    Microcytic anemia  Hypokalemia    Transaminitis    Stage 3a chronic kidney disease (HCC)    Thrombocytosis    Iron deficiency anemia, unspecified    Malignant neoplasm of transverse colon (HCC)    Cancer of transverse colon ([place] [address] )    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)    Colon cancer metastasized to liver ([place] [address] )    SIRS (systemic inflammatory response syndrome) ([place] [address] )     Past Medical History:   Diagnosis Date    Abdominal pain 3/12/2022    Acute renal failure ([place] [address] )     [id number] resolved    Diabetes mellitus ([place] [address] )     Enteritis 08/23/2016    Gastroparesis due to DM ([place] [address] ) 08/23/2016    GERD (gastroesophageal reflux disease)     Hernia, ventral 08/04/2016    Hyperlipidemia     Hypertension     Postoperative visit 3/2/2022     Past Surgical History:   Procedure Laterality Date    COLOSTOMY N/A 2/20/2022    Procedure: COLOSTOMY LOOP, diverting;  Surgeon: Luz Maria Kang MD;  [facility]: [facility];  Service: General    ESOPHAGOGASTRODUODENOSCOPY N/A 8/24/2016    Procedure: ESOPHAGOGASTRODUODENOSCOPY (EGD); Surgeon: Ten Teixeira MD;  [facility]; Service:     IR PORT PLACEMENT  3/10/2022    KIDNEY STONE SURGERY      LIVER BIOPSY LAPAROSCOPIC N/A 2/20/2022    Procedure: DIAGNOSTIC LAPAROSCOPIC LIVER BIOPSY, DIVERTING LOOP COLOSTOMY ;  Surgeon: Luz Maria Kang MD;  [facility];  Service: General    TONSILLECTOMY      UMBILICAL HERNIA REPAIR      UMBILICAL HERNIA REPAIR LAPAROSCOPIC N/A 4/26/2019    Procedure: LAPAROSCOPIC UMBILICAL HERNIA REPAIR;  Surgeon: Luz Maria Kang MD;  [facility];  Service: General       Review of Medications:   Vitamins, Supplements and Herbals: Med List Reviewed & pt is only taking: vitamin D 400IU    Current Outpatient Medications:     amLODIPine (NORVASC) 5 mg tablet, TAKE 1 TABLET BY MOUTH TWICE A DAY, Disp: 180 tablet, Rfl: 0    aspirin 81 MG tablet, Take 81 mg by mouth daily  , Disp: , Rfl:     atorvastatin (LIPITOR) 40 mg tablet, TAKE 1 TABLET BY MOUTH EVERY DAY, Disp: 90 tablet, Rfl: 3    B-D UF III MINI PEN NEEDLES 31G X 5 MM MISC, BY DOES NOT APPLY ROUTE 4 (FOUR) TIMES A DAY, Disp: 100 each, Rfl: 3    Cholecalciferol (VITAMIN D3 PO), Take 400 Units by mouth daily, Disp: , Rfl:     Continuous Blood Gluc Sensor (FreeStyle [place] 14 Day Sensor) MISC, Use 1 each every 14 (fourteen) days, Disp: 2 each, Rfl: 6    [START ON 4/6/2022] fluorouracil 5,255 mg in CADD infusion pump, Infuse 5,255 mg (1,200 mg/m2/day x 2 19 m2) into a venous catheter over 46 hours for 2 days  Do not start before April 6, 2022 , Disp: 1 each, Rfl: 0    glyBURIDE-metFORMIN (GLUCOVANCE) 5-500 MG per tablet, Take 1 tablet by mouth daily with breakfast Taking 1 tablet in the morning , Disp: , Rfl:     insulin glargine (Lantus) 100 units/mL subcutaneous injection, Inject 10 Units under the skin daily at bedtime (Patient taking differently: Inject 20 Units under the skin daily at bedtime Pt increased his insulin to 20 units ), Disp: 10 mL, Rfl: 0    Januvia 100 MG tablet, TAKE 1 TABLET BY MOUTH EVERY DAY, Disp: 90 tablet, Rfl: 3    lisinopril (ZESTRIL) 40 mg tablet, TAKE 1 TABLET BY MOUTH EVERY DAY, Disp: 90 tablet, Rfl: 3    metoclopramide (REGLAN) 10 mg tablet, Take 1 tablet (10 mg total) by mouth 2 (two) times a day, Disp: 180 tablet, Rfl: 0    Multiple Vitamins-Minerals (multivitamin with minerals) tablet, Take 1 tablet by mouth daily, Disp: , Rfl:     Needle, Disp, (HYPODERMIC NEEDLE) 23G X 1-1/2" MISC, by Does not apply route once a week, Disp: 10 each, Rfl: 6    ondansetron (ZOFRAN) 4 mg tablet, Take 4 mg by mouth every 6 (six) hours as needed, Disp: , Rfl:     Ostomy Supplies MISC, Use in the morning, Disp: 100 each, Rfl: 3    pantoprazole (PROTONIX) 40 mg tablet, TAKE 1 TABLET BY MOUTH TWICE A DAY, Disp: 180 tablet, Rfl: 3  No current facility-administered medications for this visit      Most Recent Lab Results:   Lab Results   Component Value Date    WBC 14 9 (H) 03/19/2022    NEUTROABS 11 6 (H) 03/19/2022    IRON 26 (L) 02/20/2022    TIBC 235 (L) 02/20/2022    FERRITIN 125 02/20/2022    CHOLESTEROL 125 08/26/2021    CHOL 91 (L) 04/10/2017    TRIG 75 08/26/2021    HDL 45 08/26/2021    LDLCALC 65 08/26/2021    ALT 20 03/19/2022    AST 55 (H) 03/19/2022    ALB 3 0 (L) 03/19/2022     05/02/2017     04/10/2017    SODIUM 134 03/19/2022    SODIUM 136 03/14/2022    K 4 4 03/19/2022    K 3 3 (L) 03/14/2022    CL 95 (L) 03/19/2022    BUN 10 03/19/2022    BUN 11 03/14/2022    CREATININE 1 03 03/19/2022    CREATININE 1 15 03/14/2022    EGFR 85 03/19/2022    TSH 0 719 08/26/2021    GLUCOSE 118 (H) 05/02/2017    POCGLU 309 (H) 03/14/2022    GLUF 246 (H) 03/14/2022    GLUC 145 (H) 03/19/2022    HGBA1C 7 5 (H) 02/10/2022    HGBA1C 8 0 (H) 08/26/2021    HGBA1C 9 9 (H) 04/10/2021    CALCIUM 7 9 (L) 03/14/2022       Anthropometric Measurements:   Height: 68"  Ht Readings from Last 1 Encounters:   03/23/22 5' 8 58" (1 742 m)     Wt Readings from Last 20 Encounters:   03/23/22 105 kg (231 lb 11 3 oz)   03/18/22 105 kg (231 lb 3 2 oz)   03/17/22 105 kg (232 lb)   03/14/22 110 kg (241 lb 10 oz)   03/10/22 108 kg (239 lb)   03/07/22 109 kg (239 lb 6 4 oz)   03/02/22 108 kg (239 lb)   03/01/22 109 kg (240 lb)   02/18/22 133 kg (293 lb 3 4 oz)   03/23/21 133 kg (292 lb 9 6 oz)   03/03/20 131 kg (289 lb)   03/02/20 133 kg (293 lb 6 4 oz)   02/04/20 126 kg (277 lb)   02/03/20 127 kg (280 lb)   01/04/20 127 kg (280 lb)   06/11/19 127 kg (279 lb 6 4 oz)   05/16/19 127 kg (279 lb 6 4 oz)   04/26/19 127 kg (279 lb 1 6 oz)   04/05/19 129 kg (285 lb 3 2 oz)   04/02/19 131 kg (289 lb)       Weight History:    Usual Weight: 290#   Varian: n/a   Home Scale: none     Oncology Nutrition-Anthropometrics      Nutrition from 3/30/2022 in [facility] Dietitian Dwayne Documentation from 3/23/2022 in [address] [place] [address]   Patient age (years): 61 years 61 years Patient (male) height (in): 68 in 68 in   Current weight (lbs): 231 7 lbs 231 7 lbs   Current weight to be used for anthropometric calculations (kg) 105 3 kg 105 3 kg   BMI: 35 2 35 2   IBW male 154 lb 154 lb   IBW (kg) male 70 kg 70 kg   IBW % (male) 150 5 % 150 5 %   Adjusted BW (male): 173 4 lbs 173 4 lbs   Adjusted BW in kg (male): 78 8 kg 78 8 kg   % weight change after 1 week: -0 1 % -0 1 %   Weight change after 1 week (lbs) -0 3 lbs -0 3 lbs   % weight change after 1 month: -21 % -21 %   Weight change after 1 month (lbs) -61 5 lbs -61 5 lbs   % weight change after 3 months: -20 8 % --   Weight change after 3 months (lbs) -60 9 lbs --   % weight change after 1 year: -- -20 8 %   Weight change after 1 year (lbs) -- -60 9 lbs          Nutrition-Focused Physical Findings: none observed    Food/Nutrition-Related History & Client/Social History:    Current Nutrition Impact Symptoms:  [] Nausea  [] Reduced Appetite  [] Acid Reflux    [] Vomiting  [x] Unintended Wt Loss -significant x1 month and 3 months  [] Malabsorption    [] Diarrhea  [] Unintended Wt Gain  [] Dumping Syndrome    [] Constipation -reports an episode of hyperglycemia and slowed ostomy output  [] Thick Mucous/Secretions  [] Abdominal Pain    [] Dysgeusia (Altered Taste)  [] Xerostomia (Dry Mouth)  [] Gas    [] Dysosmia (Altered Smell)  [] Thrush  [] Difficulty Chewing    [] Oral Mucositis (Sore Mouth)  [] Fatigue  [x] Hyperglycemia: hba1c 7 5% on 2/10/22; BG fasting 246mg/dL on 3/14/22      [] Odynophagia  [] Esophagitis  [] Other:    [] Dysphagia  [] Early Satiety  [] No Problems Eating      Food Allergies & Intolerances: no    Current Diet: Post- colostomy  Current Nutrition Intake: Less than usual   Appetite: Good, Fair   Nutrition Route: PO  Oral Care: brushes BID  Activity level: ADL     24 Hr Diet Recall:   Breakfast: Premier Protein shake, corn flakes, yogurt OR eggs OR toast with cream cheese OR [place] toast  Lunch: tuna salad   Dinner: ground beef burrito OR rotisserie chicken, string beans  Plans to get salmon  Snack: Vanilla wafers     Beverages: water (12oz x1-3)  Supplements:    Premier Protein Shake (11 oz, 160 kcal, 30 g pro) 1x daily      Oncology Nutrition-Estimated Needs      Nutrition from 3/30/2022 in [address] [place] [address] Documentation from 3/23/2022 in [address] [place] [address]   Weight type used Actual weight Actual weight   Weight in kilograms (kg) used for estimated needs 105 3 kg 105 3 kg   Energy needs formula:  35-40 kcal/kg 35-40 kcal/kg   Energy needs based on 35 kcal/k kcal 3686 kcal   Energy needs based on 40 kcal/k kcal 4213 kcal   Protein needs formula: 1 5-2 g/kg 1 5-2 g/kg   Protein needs based on 1 5 g/kg 158 g 158 g   Protein needs based on 2 g/kg 211 g 211 g   Fluid needs formula: 30-35 mL/kg 30-35 mL/kg   Fluid needs based on 30 mL/kg 3165 mL 3165 mL   Fluid needs in ounces 107 oz 107 oz   Fluid needs based on 35 mL/kg 3693 mL 3693 mL   Fluid needs in ounces 125 oz 125 oz           Discussion & Intervention:   Tc Smoker was evaluated today for an RD follow up regarding wt loss and diet post colostomy  Tc Smoker is currently undergoing tx for colon cancer  Today Reji Najera explains that he continues to work on increasing the variety of foods he is eating  He has tried corn flakes for breakfast in the morning  He plans to introduce salmon for dinner and possibly steak  Reviewed 24 hour recall, which revealed an suboptimal po intake, and discussed ways to increase kcal, protein, and fluid intakes  Also reviewed the importance of wt management throughout the tx process and the role of a high kcal/ high protein diet in managing wt and overall health    Based on today's assessment, discussion included: MNT for: colostomy, hyperglycemia , how to modify foods for anticipated nutrition impact symptoms pt may experience during CA tx, fortifying foods for added kcal and protein (examples include: adding cheese to foods such as eggs, mashed potatoes, casseroles, etc ; Making oatmeal with whole milk rather than water; Making fortified mashed potatoes with cream, butter, dry milk powder, plain [place] yogurt, and cheese ), adequate hydration & fluid choices, eating smaller more frequent meals every 2-3 hours (5-6 small meals/day), keeping non-perishable foods nearby to snack on and continuing oral nutrition supplements  Moving forward, [employer] to continue trials of new foods  Materials Provided: not applicable  All questions and concerns addressed during todays visit  Rocheshiloh Ezio has RD contact information  Nutrition Diagnosis:    Inadequate Energy Intake related to physiological causes, disease state and treatment related issues as evidenced by food recall, wt loss and discussion with pt and/or family   Increased Nutrient Needs (kcal & pro) related to increased demand for nutrients and disease state as evidenced by cancer dx and pt undergoing tx for cancer  Monitoring & Evaluation:   Goals:  · weight maintenance/stabilization  · adequate nutrition impact symptom management  · pt to meet >/=75% estimated nutrition needs daily  · adequate glycemic control    · Progress Towards Goals: Progressing    Nutrition Rx & Recommendations:  · Diet: High Calorie, High Protein (for high calorie foods see pages 52-53, and for high protein foods see pages 49-51 in your Eating Hints book)  · Keep a daily food journal (pen/paper, bossman such as [employer])  · Small, frequent meals/snacks may be easier to tolerate than 3 large daily meals  Aim for 5-6 small meals per day (every 2-3 hours)  · Include protein at all meals/snacks  · Include a variety of foods (as tolerated/allowed by diet)  · Stay hydrated by sipping fluids of choice/tolerance throughout the day  · Alter food choices and eating patterns to accommodate changing needs  · Weigh yourself regularly   If you notice weight loss, make an effort to increase your daily food/calorie intake  If you continue to notice loss after these efforts, reach out to your dietitian to establish a plan to stabilize weight  · Avoid gas-producing foods such as broccoli, cabbage, sauerkraut, [place] sprouts, cauliflower, corn, cucumbers, onions, peppers, beans, peas, lentils, apples, apple juice, avocado, and melons  Carbonated drinks, chewing gum, and drinking through a straw can also cause gas  Limiting lactose may help for those who are sensitive to milk products  · Spread carbohydrate intake throughout the day for better glycemic control  · Continue Premier Protein daily  · For extra calories: add extra sauces/gravies, keep non perishable snacks nearby, choose liquids with calories, add extra cream cheese/cheese to foods as tolerated  Nutrition after colostomy placement:    Diet guidelines for the first 6 weeks:  Eat 5-6 smaller and more frequent meals throughout the day  Try not to skip meals, skipping meals can cause gas  Have your largest meal in the middle of the day  Eating a lot in the evening can cause higher stool output overnight  Choose low fiber foods  Try to eat less that 10g of fiber per day  After you have healed from surgery you can gradually add fiber back into your diet  Chew your foods well  Avoid spicy, acidic, fried, and greasy foods  These foods are harder to digest and can cause discomfort  Limit simple sugars such as desserts and sugar sweetened beverages  Foods high in added sugars can cause diarrhea  Stay hydrated with at least 8-10 cups of liquid daily  Drink liquids 30 minutes after meals to avoid flushing foods through too quickly  If ostomy output is higher than usual, try to drink more fluids to avoid dehydration  After 6 weeks:  After 6 weeks post surgery you can start adding foods that you have been avoiding and back into your diet  Add new foods slowly, one at a time, and chew them well        Food choices and their effects:   Foods that may cause diarrhea or increased output: fresh or raw fruit, fresh or raw vegetables, fried or spicy foods, high sugar beverages, legumes, milk and dairy foods, prune juice, sugar alcohols (sorbitol, mannitol, xylitol)  Foods that may help thicken stool: applesauce, bananas, cheese, marshmallows, oatmeal, creamy peanut butter, potatoes (no skin), tapioca pudding, white bread, pretzels, saltine crackers, white rice, white pasta/noodles, yogurt  Foods that may cause gas or odor: eggs, onions, peanuts, legumes, carbonated drinks, cruciferous vegetables (Brussel's sprouts, cauliflower, broccoli, cabbage), chewing gum  Foods that may help relieve gas and odor: buttermilk, cranberry juice, parsley, yogurt with active cultures  See "Diet and Nutrition After Colostomy" handout for additional suggestions on foods to choose and foods to avoid/limit after ostomy placement           Follow Up Plan: During infusion 4/6/22  Recommend Referral to Other Providers: none at this time

## 2022-03-30 NOTE — PROGRESS NOTES
Pt here venofer, offering no complaints  Right port accessed with  positive blood return noted  Tolerated infusion without incident  Port flushed and de-accessed  AVS declined  Walked out in stable condition

## 2022-03-30 NOTE — PROGRESS NOTES
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 and some other avenues he almost pursued in life  His wife is an actress as well but has been a stay at home mom for their two children while pt was on tour with the [employer] for the past 19 years  He talked about his love and passion for the performing arts, and various memories from his career  His teenage children are both musical, and they agree that their daughter will likely be a "theater kid", however their son has no interest in acting, theater, drama, etc   Pt says that his children are doing well with the news of his dx, and feels that they have been less anxious after his first treatment  He says that they have been open in discussing what is going on with him, however they have not mentioned that his cancer is treatable, but not curable  Pt talked about how he started having his health issues and regrets about not being dx sooner  He shared that with the nature of his work, he continued to put things off until his week at home, which inevitably led to him having to take emergency medical VIVIEN  He says that he is managing well now that he has had the colostomy and has been through his first treatment cycle, he is learning the ins and outs of being a cancer patient and how to best navigate all of this  He has been working on his application for SSD and says that for the time being, they are using savings to pay their bills  He has insurance through the [employer]'s union through March 2023  His hope and goal is to return to work in some capacity by the fall, and say that he already has a potential job lined up for a 9 week time frame  He is also hopeful to have the colostomy reversed in the next few months    He is very complimentary of the care he has received, starting in the ED before dx and up until today in the infusion center  Pt says that he is choosing to stay positive during this time, and has a personal motto in his life that he lives by, "whatever it takes"  He says that he has a lot of strength and support from his family  He does give himself space to be emotional and vent his feelings, but says that he is past the point of dwelling on the negativity or the regrets that he has  He was very appreciative of the support today and thanked me for stopping to talk to them  I provided him with my office and cell phone number, and will continue to check in periodically while he is here for tx  No other needs or concerns at this time

## 2022-04-02 LAB
ALBUMIN SERPL-MCNC: 3.1 G/DL (ref 3.8–4.9)
ALBUMIN/GLOB SERPL: 1 {RATIO} (ref 1.2–2.2)
ALP SERPL-CCNC: 428 IU/L (ref 44–121)
ALT SERPL-CCNC: 25 IU/L (ref 0–44)
AST SERPL-CCNC: 48 IU/L (ref 0–40)
BASOPHILS # BLD AUTO: 0.1 X10E3/UL (ref 0–0.2)
BASOPHILS NFR BLD AUTO: 1 %
BILIRUB SERPL-MCNC: 0.3 MG/DL (ref 0–1.2)
BUN SERPL-MCNC: 17 MG/DL (ref 6–24)
BUN/CREAT SERPL: 18 (ref 9–20)
CALCIUM SERPL-MCNC: 8.7 MG/DL (ref 8.7–10.2)
CHLORIDE SERPL-SCNC: 98 MMOL/L (ref 96–106)
CO2 SERPL-SCNC: 20 MMOL/L (ref 20–29)
CREAT SERPL-MCNC: 0.97 MG/DL (ref 0.76–1.27)
EGFR: 91 ML/MIN/1.73
EOSINOPHIL # BLD AUTO: 0.2 X10E3/UL (ref 0–0.4)
EOSINOPHIL NFR BLD AUTO: 2 %
ERYTHROCYTE [DISTWIDTH] IN BLOOD BY AUTOMATED COUNT: 18.6 % (ref 11.6–15.4)
GLOBULIN SER-MCNC: 3 G/DL (ref 1.5–4.5)
GLUCOSE SERPL-MCNC: 266 MG/DL (ref 65–99)
HCT VFR BLD AUTO: 28.7 % (ref 37.5–51)
HGB BLD-MCNC: 8.7 G/DL (ref 13–17.7)
IMM GRANULOCYTES # BLD: 0 X10E3/UL (ref 0–0.1)
IMM GRANULOCYTES NFR BLD: 0 %
LYMPHOCYTES # BLD AUTO: 2 X10E3/UL (ref 0.7–3.1)
LYMPHOCYTES NFR BLD AUTO: 26 %
MCH RBC QN AUTO: 23 PG (ref 26.6–33)
MCHC RBC AUTO-ENTMCNC: 30.3 G/DL (ref 31.5–35.7)
MCV RBC AUTO: 76 FL (ref 79–97)
MONOCYTES # BLD AUTO: 0.7 X10E3/UL (ref 0.1–0.9)
MONOCYTES NFR BLD AUTO: 10 %
NEUTROPHILS # BLD AUTO: 4.6 X10E3/UL (ref 1.4–7)
NEUTROPHILS NFR BLD AUTO: 61 %
PLATELET # BLD AUTO: 627 X10E3/UL (ref 150–450)
POTASSIUM SERPL-SCNC: 4.6 MMOL/L (ref 3.5–5.2)
PROT SERPL-MCNC: 6.1 G/DL (ref 6–8.5)
RBC # BLD AUTO: 3.79 X10E6/UL (ref 4.14–5.8)
SODIUM SERPL-SCNC: 134 MMOL/L (ref 134–144)
WBC # BLD AUTO: 7.5 X10E3/UL (ref 3.4–10.8)

## 2022-04-03 NOTE — PROGRESS NOTES
Hematology/Oncology Progress Note    Date of Service: 4/4/2022    [address] [facility]   [address] [address]    Hem/Onc Problem List:   1  Metastatic Colon Cancer  2  Microcytic Hypochromic Anemia  3  Thrombocytosis    Chief Complaint:    Follow up regarding management of above  Assessment/Plan:     I personally reviewed the lab results, image studies results and other specialties/physicians consult notes and recommendations  I shared the findings with patient and family, discussed the diagnosis and management plan as below  1  Metastatic colon cancer with extensive liver metastasis, MMR expression intact   Status post colostomy  S/P 1 cycle of mFOLFOX6 + Avastin therapy with cycle 1, day 1 March 23, 2022  Patient tolerated 1st treatment well without major side effect  Did notice cold sensitivity at times  Otherwise, no significant peripheral neuropathy  He uses gloves to help with cold sensitivity  Did also notice fatigue days following treatment  Otherwise no new HA, CP, SOB, abd pain, N/V/D  He feels ready for next treatment which is scheduled for April 6, 2022  Patient's labs are acceptable for treatment  Will continue current regimen  He will follow up with Dr Barbara Chávez in 4 weeks  Next CT chest,abd, pelvis will be scheduled for 3 months after last CT in 3/2022      2  Microcytic hypochromic anemia likely due to GI bleeding  He has no additional bleeding seen into colostomy bag  He tolerated 2 Venofer treatments thus far in last 2 weeks  His Hgb us stable at 8 7g/dL  Will continue Venofer treatments for 2 more dosages  Will repeat iron panel in future at approximately 1 month after last treatment  3  Thrombocytosis, Likely secondary to JOSE as above  Is mildly improved since starting IV iron  Now 627K on last lab work, was 777K  Will continue to monitor      4  Elevated alkaline phosphatase and transaminase due to liver metastasis   Is improving per last lab work at 428  Was previously 0  AST 48, ALT 25  Total bilirubin 0 3  Will continue to monitor  Disclaimer: This document was prepared using [employer] Fluency Direct technology  If a word or phrase is confusing, or does not make sense, this is likely due to recognition error which was not discovered during the providers review  If you believe an error has occurred, please Contact me through [employer] service for dorothy? cation  AJCC 8th Edition Cancer Stage :      Cancer Staging  No matching staging information was found for the patient  Hematology/Oncology History:   · February 18, 2022 Patient was admitted to hospital because of abdominal pain   CT abdomen:  IMPRESSION:     1   Distal transverse colonic apple core lesion causing a degree of obstruction with dilated upstream colon with mild surrounding fat stranding   Small foci of gas peripheral to colonic stool may represent trapped air; however, pneumatosis cannot be   entirely excluded      2   Innumerable hepatic metastases      3   Nonenlarged mesenteric lymph nodes adjacent to the colonic mass, concerning for metastatic disease, given their location  · February 19, 2022, Chest wo contrast:  6 mm right upper lobe lung nodule   No definite metastatic disease in the chest   · February 20, 2022, S/P diagnostic laparoscopy with diverting loop transverse colostomy secondary to near obstructive apple core lesion of distal transverse colon and CT-guided biopsy of liver lesion with pathology seen below  Final Diagnosis   A  Liver, biopsy:  - Adenocarcinoma, consistent with colorectal primary      Comment: Immunohistochemistry was performed on block A1  The tumor cells are positive for CK20, SATB2, CDX2,  and negative for TTF-1, NKX3 1, Ck7; DPC-4 shows no loss, supporting the above diagnosis    Dr Manny Jones notified of the diagnosis in [facility] via [address] on 2/24/2022  at 11:36 am    Immunohistochemistry (IHC) testing for Mismatch Repair (MMR) proteins has been requested on block A1, and the results will be issued in an addendum          · February 20, 2022  MMR expression intact  BWW 496 5, CA 19-9 1848   Alkaline phosphatase elevated  · March 12, 2022 CT abdomen pelvis with contrast:  Postoperative changes of recent right mid abdominal diverting colostomy   No evidence of traction   Innumerable hepatic metastasis similar to the prior exam      · March 12-14,2022, patient was admitted to the hospital with fever, abdominal pain, and no ostomy output  Prior to abdominal pain, had "heavy food" per H&P  He had lack of output as well and was concerned  His WBC was elevated 22K and lactate 2 8  Met SIRS and was admitted  It was thought he likely had inflammation, possibly colitis in setting of ostomy output  He was D/C on Augmentin  General surgery evaluated patient and no intervention was needed as patient regained function of ostomy having stool in bag  He was supposed to be begin mFOLFOX + Avastin March 14th  It was decided he would be pushed back 1 week due to admission  · March 15, 2022, OncotypeMAP showed no therapies with NCCN guideline or FDA approval  See document for details  History of Present Illiness:   Ozzy Ballesteros is a 61 y o  male with the above-noted HemOnc history who is here to follow up after treatment  Patient is status post 1 treatment of FOLFOX 6 + Avastin Q14 days  He states that he did have mild fatigue, slight numbness/tingling in hands after opening the Fridge  He knows to use gloves to help this  Otherwise, no other peripheral neuropathy  Patient's next treatment starts 04/06/2022 04/01/2022:  WBC 7 5, hemoglobin 8 7, MCV 76, platelet count 041  Diff unremarkable with ANC 4 6  Glucose 266, albumin 3 1, alk-phos 428, AST 48, ALT 25  Patient feels fine  No fever or chills  No exertional chest pain, diaphoresis or shortness  of breath    No cough and phlegm, no hemoptysis  Patient denied nausea  and vomiting  No abdominal pain  No diarrhea or constipation  No  symptoms  No headache or blurred vision  No seizure activity  Appetite good  No significant weight loss or weight gain  The patient denies bleeding anywhere  ROS: A 12-point of review of systems is obtained and other than the above is noncontributory  Objective:   VITALS:   /78 (BP Location: Left arm, Patient Position: Sitting, Cuff Size: Adult)   Pulse 87   Temp 98 1 °F (36 7 °C)   Resp 16   Ht 5' 8" (1 727 m)   Wt 103 kg (227 lb)   SpO2 98%   BMI 34 52 kg/m²     Physical EXAM:  General:  Alert, cooperative, no distress, appears stated age  Head:  Normocephalic, without obvious abnormality, atraumatic  Eyes:  Conjunctivae/corneas clear  PERRL, EOMs intact  No evidence of conjunctivitis     Throat: Lips, mucosa, and tongue normal   No bleeding from mouth  Neck: Supple, symmetrical, trachea midline    Lungs:   Clear to auscultation bilaterally  Respiratory effort easy, nonlabored    Heart:  Regular rate and rhythm, S1, S2 normal, no murmur  Abdomen:   Soft, non-tender,nondistended  Bowel sounds normal  No masses,  No organomegaly  +colostomy bag present  Extremities:  Lymphatics: Extremities normal, atraumatic, no cyanosis or edema  No cervical, axillary or inguinal adenopathy   Skin: Skin color, texture, turgor normal  No rashes  Neurologic: A&Ox4   No focal neuro deficits       Allergies   Allergen Reactions    Shellfish-Derived Products - Food Allergy Anaphylaxis    Erythromycin GI Intolerance     Pt denies       Past Medical History:   Diagnosis Date    Abdominal pain 3/12/2022    Acute renal failure ([facility] )     [id number] resolved    Diabetes mellitus ([facility] )     Enteritis 08/23/2016    [facility] to [facility] 08/23/2016    GERD (gastroesophageal reflux disease)     Hernia, ventral 08/04/2016    Hyperlipidemia     Hypertension     Postoperative visit 3/2/2022       Past Surgical History:   Procedure Laterality Date    COLOSTOMY N/A 2/20/2022    Procedure: COLOSTOMY LOOP, diverting;  Surgeon: Alexey Packer MD;  Location: [facility];  Service: General    ESOPHAGOGASTRODUODENOSCOPY N/A 8/24/2016    Procedure: ESOPHAGOGASTRODUODENOSCOPY (EGD); Surgeon: Meño Acevedo MD;  [facility];   Service:     IR PORT PLACEMENT  3/10/2022    KIDNEY STONE SURGERY      LIVER BIOPSY LAPAROSCOPIC N/A 2/20/2022    Procedure: DIAGNOSTIC LAPAROSCOPIC LIVER BIOPSY, DIVERTING LOOP COLOSTOMY ;  Surgeon: Alexey Packer MD;  [facility];  Service: General    TONSILLECTOMY      UMBILICAL HERNIA REPAIR      UMBILICAL HERNIA REPAIR LAPAROSCOPIC N/A 4/26/2019    Procedure: LAPAROSCOPIC UMBILICAL HERNIA REPAIR;  Surgeon: Alexey Packer MD;  [facility];  Service: General       Family History   Problem Relation Age of Onset    Diabetes Mother         mellitus    Lung cancer Mother     Coronary artery disease Father        Social History     Socioeconomic History    Marital status: /Civil Union     Spouse name: Not on file    Number of children: Not on file    Years of education: Not on file    Highest education level: Not on file   Occupational History    Occupation: ACTOR     Employer: [employer]   Tobacco Use    Smoking status: Never Smoker    Smokeless tobacco: Never Used   Vaping Use    Vaping Use: Never used   Substance and Sexual Activity    Alcohol use: Not Currently     Comment: occasion    Drug use: No    Sexual activity: Yes     Partners: Female   Other Topics Concern    Not on file   Social History Narrative    Not on file     Social Determinants of Health     Financial Resource Strain: Not on file   Food Insecurity: No Food Insecurity    Worried About Running Out of Food in the Last Year: Never true    Bibiana of Food in the Last Year: Never true   Transportation Needs: No Transportation Needs  Lack of Transportation (Medical): No    Lack of Transportation (Non-Medical): No   Physical Activity: Insufficiently Active    Days of Exercise per Week: 5 days    Minutes of Exercise per Session: 10 min   Stress: No Stress Concern Present    Feeling of Stress : Not at all   Social Connections: Not on file   Intimate Partner Violence: Not on file   Housing Stability: Low Risk     Unable to Pay for Housing in the Last Year: No    Number of Places Lived in the Last Year: 1    Unstable Housing in the Last Year: No       Current Outpatient Medications   Medication Sig Dispense Refill    amLODIPine (NORVASC) 5 mg tablet TAKE 1 TABLET BY MOUTH TWICE A  tablet 0    aspirin 81 MG tablet Take 81 mg by mouth daily        atorvastatin (LIPITOR) 40 mg tablet TAKE 1 TABLET BY MOUTH EVERY DAY 90 tablet 3    B-D UF III MINI PEN NEEDLES 31G X 5 MM MISC BY DOES NOT APPLY ROUTE 4 (FOUR) TIMES A  each 3    Cholecalciferol (VITAMIN D3 PO) Take 400 Units by mouth daily      Continuous Blood Gluc Sensor (BlipifyStyle Parrish 14 Day Sensor) MISC Use 1 each every 14 (fourteen) days 2 each 6    [START ON 4/6/2022] fluorouracil 5,255 mg in CADD infusion pump Infuse 5,255 mg (1,200 mg/m2/day x 2 19 m2) into a venous catheter over 46 hours for 2 days  Do not start before April 6, 2022  1 each 0    glyBURIDE-metFORMIN (GLUCOVANCE) 5-500 MG per tablet Take 1 tablet by mouth daily with breakfast Taking 1 tablet in the morning       insulin glargine (Lantus) 100 units/mL subcutaneous injection Inject 10 Units under the skin daily at bedtime (Patient taking differently: Inject 20 Units under the skin daily at bedtime Pt increased his insulin to 20 units ) 10 mL 0    Januvia 100 MG tablet TAKE 1 TABLET BY MOUTH EVERY DAY 90 tablet 3    lisinopril (ZESTRIL) 40 mg tablet TAKE 1 TABLET BY MOUTH EVERY DAY 90 tablet 3    metoclopramide (REGLAN) 10 mg tablet Take 1 tablet (10 mg total) by mouth 2 (two) times a day 180 tablet 0    Multiple Vitamins-Minerals (multivitamin with minerals) tablet Take 1 tablet by mouth daily      Needle, Disp, (HYPODERMIC NEEDLE) 23G X 1-1/2" MISC by Does not apply route once a week 10 each 6    ondansetron (ZOFRAN) 4 mg tablet Take 4 mg by mouth every 6 (six) hours as needed      Ostomy Supplies MISC Use in the morning 100 each 3    pantoprazole (PROTONIX) 40 mg tablet TAKE 1 TABLET BY MOUTH TWICE A  tablet 3     No current facility-administered medications for this visit  (Not in a hospital admission)      DATA REVIEW:    Pathology Result:    Final Diagnosis   Date Value Ref Range Status   02/20/2022   Final    A  Liver, biopsy:  - Adenocarcinoma, consistent with colorectal primary  Comment: Immunohistochemistry was performed on block A1  The tumor cells are positive for CK20, SATB2, CDX2,  and negative for TTF-1, NKX3 1, Ck7; DPC-4 shows no loss, supporting the above diagnosis  Dr Fara Payan is notified of the diagnosis in [facility] via [employer] on 2/24/2022  at 11:36 am    Immunohistochemistry [facility]) testing for Mismatch Repair (MMR) proteins has been requested on block A1, and the results will be issued in an addendum  08/24/2016   Final    A  Small bowel (biopsy):  - Duodenal mucosa with no diagnostic abnormality  - No Marsh lesion  - Negative for dysplasia     B  Stomach, body (biopsy):  - Chronic inactive oxyntic gastritis  - No H pylori identified on immunohistochemistry stain  - No intestinal metaplasia or dysplasia    C  Distal esophagus (biopsy):  - Cardiac-type mucosa with intestinal metaplasia   - Negative for dysplasia     Comment: This biopsy shows gastric-type mucosa with scattered goblet cells  The diagnosis in this case depends on the location of this biopsy  If this biopsy was taken from the tubular esophagus, it represents Esparza mucosa of the distinctive type    If this biopsy was taken from the gastric cardia, it represents intestinal metaplasia of the gastric cardia  Interpretation performed at [facility], [address] [place] [address] [place] [id number]            Image Results: They are reviewed and documented in Hematology/Oncology history    XR chest pa & lateral  Narrative: CHEST     INDICATION:   fever r/o pna  COMPARISON:  Chest CT 2/19/2022    EXAM PERFORMED/VIEWS:  XR CHEST PA & LATERAL  The frontal view was performed utilizing dual energy radiographic technique  Images: 5    FINDINGS:  Right chest wall port, tubing tip at the cavoatrial junction  Cardiomediastinal silhouette appears unremarkable  The lungs are clear  No pneumothorax or pleural effusion  Osseous structures appear within normal limits for patient age  Hiatal hernia  Impression: No acute cardiopulmonary disease  Workstation performed: [id number]  CT abdomen pelvis with contrast  Narrative: CT ABDOMEN AND PELVIS WITH IV CONTRAST    INDICATION:   recent colostomy, no output or gas since eating lunch several hours ago eval obstruction  COMPARISON:  CT abdomen/pelvis dated February 18, 2022  TECHNIQUE:  CT examination of the abdomen and pelvis was performed  In addition to portal venous phase postcontrast scanning through the abdomen and pelvis, delayed phase postcontrast scanning was performed through the upper abdominal viscera  Axial,   sagittal, and coronal 2D reformatted images were created from the source data and submitted for interpretation  Radiation dose length product (DLP) for this visit:  1875 mGy-cm   This examination, like all CT scans performed in the [facility], was performed utilizing techniques to minimize radiation dose exposure, including the use of iterative   reconstruction and automated exposure control  IV Contrast:  100 mL of iohexol (OMNIPAQUE)  Enteric Contrast:  Enteric contrast was not administered      FINDINGS:    ABDOMEN    LOWER CHEST:  No clinically significant abnormality identified in the visualized lower chest     LIVER/BILIARY TREE:  Again noted are innumerable hypodense lesions compatible with metastatic disease, similar to the prior exam   No intrahepatic biliary ductal dilatation  No portal vein thrombosis  GALLBLADDER:  There are gallstone(s) within the gallbladder, without pericholecystic inflammatory changes  SPLEEN:  The liver is enlarged measuring 14 6 cm in length  PANCREAS:  Unremarkable  ADRENAL GLANDS:  Unremarkable  KIDNEYS/URETERS:  One or more simple renal cyst(s) is noted  No hydronephrosis  One or more sharply circumscribed subcentimeter renal hypodensities are noted  These lesions are too small to accurately characterize, but are statistically most likely to represent benign cortical renal cyst(s)  According to the guidelines published in   the [facility] Paper of the ACR Incidental Findings Committee (Radiology 2010), no further workup of these lesions is recommended  STOMACH AND BOWEL:  Postoperative changes of recent right mid abdominal diverting colostomy are noted  There is no evidence of large or small bowel obstruction  Again noted is an apple core lesion at the distal transverse colon which measures   approximately 5 cm in length  APPENDIX:  No findings to suggest appendicitis  ABDOMINOPELVIC CAVITY:  There is a small amount of intra-abdominal and pelvic ascites  No pneumoperitoneum  Although there is no lymphadenopathy, there are a few nonenlarged lymph nodes adjacent to the patient's distal transverse colonic mass similar   to the prior exam and suspicious for metastatic disease  VESSELS:  Unremarkable for patient's age  PELVIS    REPRODUCTIVE ORGANS:  Unremarkable for patient's age  URINARY BLADDER:  Unremarkable  ABDOMINAL WALL/INGUINAL REGIONS:  Unremarkable  OSSEOUS STRUCTURES:  No acute fracture or destructive osseous lesion  Impression: Postoperative changes of recent right mid abdominal diverting colostomy    There is no evidence of obstruction  The small bowel loops are of normal caliber  Small amount of intra-abdominal and pelvic ascites  No free intraperitoneal air  Innumerable hepatic metastases similar to the prior exam     Reidentified apple core cortical lesion at the distal transverse colon  Workstation performed: [id number]        LABS:  Lab data are reviewed and documented in HemOnc history  No results found for this or any previous visit (from the past 48 hour(s))            Harman Davenport PA-C  4/4/2022, 9:54 PM

## 2022-04-04 ENCOUNTER — DOCUMENTATION (OUTPATIENT)
Dept: HEMATOLOGY ONCOLOGY | Facility: CLINIC | Age: 57
End: 2022-04-04

## 2022-04-04 ENCOUNTER — OFFICE VISIT (OUTPATIENT)
Dept: HEMATOLOGY ONCOLOGY | Facility: CLINIC | Age: 57
End: 2022-04-04
Payer: COMMERCIAL

## 2022-04-04 VITALS
RESPIRATION RATE: 16 BRPM | WEIGHT: 227 LBS | TEMPERATURE: 98.1 F | DIASTOLIC BLOOD PRESSURE: 78 MMHG | HEIGHT: 68 IN | SYSTOLIC BLOOD PRESSURE: 136 MMHG | OXYGEN SATURATION: 98 % | BODY MASS INDEX: 34.4 KG/M2 | HEART RATE: 87 BPM

## 2022-04-04 DIAGNOSIS — R74.8 ELEVATED ALKALINE PHOSPHATASE LEVEL: ICD-10-CM

## 2022-04-04 DIAGNOSIS — D50.0 IRON DEFICIENCY ANEMIA DUE TO CHRONIC BLOOD LOSS: ICD-10-CM

## 2022-04-04 DIAGNOSIS — C18.4 MALIGNANT NEOPLASM OF TRANSVERSE COLON (HCC): Primary | ICD-10-CM

## 2022-04-04 DIAGNOSIS — D75.839 THROMBOCYTOSIS: ICD-10-CM

## 2022-04-04 PROCEDURE — 3008F BODY MASS INDEX DOCD: CPT | Performed by: INTERNAL MEDICINE

## 2022-04-04 PROCEDURE — 99214 OFFICE O/P EST MOD 30 MIN: CPT | Performed by: INTERNAL MEDICINE

## 2022-04-04 NOTE — PROGRESS NOTES
Telephone call with patient and I inquired on progress  He has had his 1st cycle and it went very well with very few side effects  He was up beat and talkative  He has been working on his disability papers and asked me for the Oncology social workers phone number as he has misplaced it  He is very hopeful and looks forward to getting this cancer under control    Extremely pleasant to speak with

## 2022-04-06 ENCOUNTER — NUTRITION (OUTPATIENT)
Dept: NUTRITION | Facility: CLINIC | Age: 57
End: 2022-04-06

## 2022-04-06 ENCOUNTER — HOSPITAL ENCOUNTER (OUTPATIENT)
Dept: INFUSION CENTER | Facility: CLINIC | Age: 57
Discharge: HOME | End: 2022-04-06
Payer: COMMERCIAL

## 2022-04-06 ENCOUNTER — PATIENT OUTREACH (OUTPATIENT)
Dept: CASE MANAGEMENT | Facility: HOSPITAL | Age: 57
End: 2022-04-06

## 2022-04-06 VITALS
DIASTOLIC BLOOD PRESSURE: 70 MMHG | SYSTOLIC BLOOD PRESSURE: 128 MMHG | TEMPERATURE: 97.6 F | WEIGHT: 228.4 LBS | HEIGHT: 68 IN | HEART RATE: 88 BPM | BODY MASS INDEX: 34.62 KG/M2 | RESPIRATION RATE: 18 BRPM

## 2022-04-06 DIAGNOSIS — D50.0 IRON DEFICIENCY ANEMIA DUE TO CHRONIC BLOOD LOSS: ICD-10-CM

## 2022-04-06 DIAGNOSIS — Z71.3 NUTRITIONAL COUNSELING: Primary | ICD-10-CM

## 2022-04-06 DIAGNOSIS — C18.4 MALIGNANT NEOPLASM OF TRANSVERSE COLON (HCC): Primary | ICD-10-CM

## 2022-04-06 PROCEDURE — 96417 CHEMO IV INFUS EACH ADDL SEQ: CPT

## 2022-04-06 PROCEDURE — 96368 THER/DIAG CONCURRENT INF: CPT

## 2022-04-06 PROCEDURE — 96411 CHEMO IV PUSH ADDL DRUG: CPT

## 2022-04-06 PROCEDURE — 96413 CHEMO IV INFUSION 1 HR: CPT

## 2022-04-06 PROCEDURE — G0498 CHEMO EXTEND IV INFUS W/PUMP: HCPCS

## 2022-04-06 PROCEDURE — 96367 TX/PROPH/DG ADDL SEQ IV INF: CPT

## 2022-04-06 PROCEDURE — 96415 CHEMO IV INFUSION ADDL HR: CPT

## 2022-04-06 RX ORDER — SODIUM CHLORIDE 9 MG/ML
20 INJECTION, SOLUTION INTRAVENOUS ONCE
Status: COMPLETED | OUTPATIENT
Start: 2022-04-06 | End: 2022-04-06

## 2022-04-06 RX ORDER — SODIUM CHLORIDE 9 MG/ML
20 INJECTION, SOLUTION INTRAVENOUS ONCE
Status: CANCELLED | OUTPATIENT
Start: 2022-04-13

## 2022-04-06 RX ORDER — DEXTROSE MONOHYDRATE 50 MG/ML
20 INJECTION, SOLUTION INTRAVENOUS ONCE
Status: COMPLETED | OUTPATIENT
Start: 2022-04-06 | End: 2022-04-06

## 2022-04-06 RX ORDER — FLUOROURACIL 50 MG/ML
400 INJECTION, SOLUTION INTRAVENOUS ONCE
Status: COMPLETED | OUTPATIENT
Start: 2022-04-06 | End: 2022-04-06

## 2022-04-06 RX ORDER — SODIUM CHLORIDE 9 MG/ML
20 INJECTION, SOLUTION INTRAVENOUS ONCE
Status: DISCONTINUED | OUTPATIENT
Start: 2022-04-06 | End: 2022-04-09 | Stop reason: HOSPADM

## 2022-04-06 RX ADMIN — DEXTROSE 20 ML/HR: 5 SOLUTION INTRAVENOUS at 13:42

## 2022-04-06 RX ADMIN — FLUOROURACIL 875 MG: 50 INJECTION, SOLUTION INTRAVENOUS at 15:51

## 2022-04-06 RX ADMIN — BEVACIZUMAB-AWWB 500 MG: 400 INJECTION, SOLUTION INTRAVENOUS at 12:14

## 2022-04-06 RX ADMIN — IRON SUCROSE 300 MG: 20 INJECTION, SOLUTION INTRAVENOUS at 09:59

## 2022-04-06 RX ADMIN — LEUCOVORIN CALCIUM 876 MG: 200 INJECTION, POWDER, LYOPHILIZED, FOR SOLUTION INTRAMUSCULAR; INTRAVENOUS at 13:43

## 2022-04-06 RX ADMIN — OXALIPLATIN 186.15 MG: 5 INJECTION, SOLUTION INTRAVENOUS at 13:45

## 2022-04-06 RX ADMIN — SODIUM CHLORIDE 20 ML/HR: 0.9 INJECTION, SOLUTION INTRAVENOUS at 09:59

## 2022-04-06 RX ADMIN — DEXAMETHASONE SODIUM PHOSPHATE: 10 INJECTION, SOLUTION INTRAMUSCULAR; INTRAVENOUS at 11:36

## 2022-04-06 NOTE — PATIENT INSTRUCTIONS
Nutrition Rx & Recommendations:  · Diet: High Calorie, High Protein (for high calorie foods see pages 52-53, and for high protein foods see pages 49-51 in your Eating Hints book)  · Keep a daily food journal (pen/paper, bossman such as [employer])  · Small, frequent meals/snacks may be easier to tolerate than 3 large daily meals  Aim for 5-6 small meals per day (every 2-3 hours)  · Include protein at all meals/snacks  · Include a variety of foods (as tolerated/allowed by diet)  · Stay hydrated by sipping fluids of choice/tolerance throughout the day  · Alter food choices and eating patterns to accommodate changing needs  · Weigh yourself regularly  If you notice weight loss, make an effort to increase your daily food/calorie intake  If you continue to notice loss after these efforts, reach out to your dietitian to establish a plan to stabilize weight  · Avoid gas-producing foods such as broccoli, cabbage, sauerkraut, [place] sprouts, cauliflower, corn, cucumbers, onions, peppers, beans, peas, lentils, apples, apple juice, avocado, and melons  Carbonated drinks, chewing gum, and drinking through a straw can also cause gas  Limiting lactose may help for those who are sensitive to milk products  · Spread carbohydrate intake throughout the day for better glycemic control  · Continue Premier Protein daily  · For extra calories: add extra sauces/gravies, keep non perishable snacks nearby, choose liquids with calories, add extra cream cheese/cheese to foods as tolerated  Nutrition after colostomy placement:    Diet guidelines for the first 6 weeks:  Eat 5-6 smaller and more frequent meals throughout the day  Try not to skip meals, skipping meals can cause gas  Have your largest meal in the middle of the day  Eating a lot in the evening can cause higher stool output overnight  Choose low fiber foods  Try to eat less that 10g of fiber per day   After you have healed from surgery you can gradually add fiber back into your diet  Chew your foods well  Avoid spicy, acidic, fried, and greasy foods  These foods are harder to digest and can cause discomfort  Limit simple sugars such as desserts and sugar sweetened beverages  Foods high in added sugars can cause diarrhea  Stay hydrated with at least 8-10 cups of liquid daily  Drink liquids 30 minutes after meals to avoid flushing foods through too quickly  If ostomy output is higher than usual, try to drink more fluids to avoid dehydration  After 6 weeks:  After 6 weeks post surgery you can start adding foods that you have been avoiding and back into your diet  Add new foods slowly, one at a time, and chew them well  Food choices and their effects:   Foods that may cause diarrhea or increased output: fresh or raw fruit, fresh or raw vegetables, fried or spicy foods, high sugar beverages, legumes, milk and dairy foods, prune juice, sugar alcohols (sorbitol, mannitol, xylitol)  Foods that may help thicken stool: applesauce, bananas, cheese, marshmallows, oatmeal, creamy peanut butter, potatoes (no skin), tapioca pudding, white bread, pretzels, saltine crackers, white rice, white pasta/noodles, yogurt  Foods that may cause gas or odor: eggs, onions, peanuts, legumes, carbonated drinks, cruciferous vegetables (Brussel's sprouts, cauliflower, broccoli, cabbage), chewing gum  Foods that may help relieve gas and odor: buttermilk, cranberry juice, parsley, yogurt with active cultures  See "Diet and Nutrition After Colostomy" handout for additional suggestions on foods to choose and foods to avoid/limit after ostomy placement           Follow Up Plan: During infusion 4/20/22  Recommend Referral to Other Providers: none at this time

## 2022-04-06 NOTE — PROGRESS NOTES
Pt is here for tx today and let me know that he did complete his application for SSD, it was submitted online and he had a few questions about what comes next, which I answered to the best of my ability  He asked to make me an authorized representative for him, so that if there are any issues SSA would be able to talk to me  I advised him to wait and see what happens next, since he just submitted the application, however if it is necessary in the future I am happy to assist as needed  He was appreciative of the help and support so far and denies any other needs  MSW will check in with pt while he is here for tx, no other needs at this time

## 2022-04-06 NOTE — PROGRESS NOTES
Outpatient Oncology Nutrition Consultation   Type of Consult: Follow Up  Care Location: [place] [id number] w/pt & wife Niecy Fung)    Reason for referral: From Infusion RN on 3/23/22 (reason for referral: Malnutrition Screening Tool (MST) Triggers: scored a 4 indicating >/=34# (>/=15 4 kg) recent wt loss and is eating poorly due to a decreased appetite  Date of MST: 3/23/22 )    Nutrition Assessment:   Oncology Diagnosis & Treatments: Diagnosed with colon cancer 3/1/22  · S/p colostomy 2/20/22  · Began chemotherapy (adrucil, oxaliplatin, mvasi) 3/23/22      Oncology History   Malignant neoplasm of transverse colon ([place] [address] )   3/1/2022 Initial Diagnosis    Malignant neoplasm of transverse colon ([place] [address] )     3/23/2022 -  Chemotherapy    fluorouracil (ADRUCIL), 400 mg/m2 = 905 mg, Intravenous, Once, 2 of 6 cycles  Administration: 905 mg (3/23/2022)  leucovorin calcium IVPB, 904 mg, Intravenous, Once, 2 of 6 cycles  Administration: 900 mg (3/23/2022)  oxaliplatin (ELOXATIN) chemo infusion, 85 mg/m2 = 192 1 mg, Intravenous, Once, 2 of 6 cycles  Administration: 192 1 mg (3/23/2022)  fluorouracil (ADRUCIL) ambulatory infusion Soln, 1,200 mg/m2/day = 5,425 mg, Intravenous, Over 46 hours, 2 of 6 cycles  bevacizumab-awwb (MVASI) IVPB, 5 mg/kg = 545 mg (100 % of original dose 5 mg/kg), Intravenous, Once, 2 of 6 cycles  Dose modification: 5 mg/kg (original dose 5 mg/kg, Cycle 1)  Administration: 545 mg (3/23/2022)       Past Medical & Surgical Hx: T2DM, gastroparesis, HLD, CKD, GERD,   Patient Active Problem List   Diagnosis    Type 2 diabetes mellitus without complication, without long-term current use of insulin (HCC)    Benign essential hypertension    Mixed hyperlipidemia    Erectile dysfunction    Low testosterone    Testicular hypogonadism    Morbid obesity ([place] [address] )    Incarcerated umbilical hernia    Left ureteral calculus    Type 2 diabetes mellitus with hyperlipidemia ([place] [address] )    Colonic mass    Microcytic anemia  Hypokalemia    Transaminitis    Stage 3a chronic kidney disease (HCC)    Thrombocytosis    Iron deficiency anemia, unspecified    Malignant neoplasm of transverse colon (HCC)    Cancer of transverse colon ([place] [address] )    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)    Colon cancer metastasized to liver ([place] [address] )    SIRS (systemic inflammatory response syndrome) ([place] [address] )     Past Medical History:   Diagnosis Date    Abdominal pain 3/12/2022    Acute renal failure ([place] [address] )     [id number] resolved    Diabetes mellitus ([place] [address] )     Enteritis 08/23/2016    Gastroparesis due to DM ([place] [address] ) 08/23/2016    GERD (gastroesophageal reflux disease)     Hernia, ventral 08/04/2016    Hyperlipidemia     Hypertension     Postoperative visit 3/2/2022     Past Surgical History:   Procedure Laterality Date    COLOSTOMY N/A 2/20/2022    Procedure: COLOSTOMY LOOP, diverting;  Surgeon: Katlin Hua MD;  [facility];  Service: General    ESOPHAGOGASTRODUODENOSCOPY N/A 8/24/2016    Procedure: ESOPHAGOGASTRODUODENOSCOPY (EGD); Surgeon: Marline Hameed MD;  [facility]; Service:     IR PORT PLACEMENT  3/10/2022    KIDNEY STONE SURGERY      LIVER BIOPSY LAPAROSCOPIC N/A 2/20/2022    Procedure: DIAGNOSTIC LAPAROSCOPIC LIVER BIOPSY, DIVERTING LOOP COLOSTOMY ;  Surgeon: Katlin Hua MD;  [facility];  Service: General    TONSILLECTOMY      UMBILICAL HERNIA REPAIR      UMBILICAL HERNIA REPAIR LAPAROSCOPIC N/A 4/26/2019    Procedure: LAPAROSCOPIC UMBILICAL HERNIA REPAIR;  Surgeon: Katlin Hua MD;  [facility];  Service: General       Review of Medications:   Vitamins, Supplements and Herbals: Med List Reviewed & pt is only taking: vitamin D 400IU    Current Outpatient Medications:     amLODIPine (NORVASC) 5 mg tablet, TAKE 1 TABLET BY MOUTH TWICE A DAY, Disp: 180 tablet, Rfl: 0    aspirin 81 MG tablet, Take 81 mg by mouth daily  , Disp: , Rfl:     atorvastatin (LIPITOR) 40 mg tablet, TAKE 1 TABLET BY MOUTH EVERY DAY, Disp: 90 tablet, Rfl: 3    B-D UF III MINI PEN NEEDLES 31G X 5 MM MISC, BY DOES NOT APPLY ROUTE 4 (FOUR) TIMES A DAY, Disp: 100 each, Rfl: 3    Cholecalciferol (VITAMIN D3 PO), Take 400 Units by mouth daily, Disp: , Rfl:     Continuous Blood Gluc Sensor (FreeStyle [place]rish 14 Day Sensor) MISC, Use 1 each every 14 (fourteen) days, Disp: 2 each, Rfl: 6    fluorouracil 5,255 mg in CADD infusion pump, Infuse 5,255 mg (1,200 mg/m2/day x 2 19 m2) into a venous catheter over 46 hours for 2 days  Do not start before April 6, 2022 , Disp: 1 each, Rfl: 0    glyBURIDE-metFORMIN (GLUCOVANCE) 5-500 MG per tablet, Take 1 tablet by mouth daily with breakfast Taking 1 tablet in the morning , Disp: , Rfl:     insulin glargine (Lantus) 100 units/mL subcutaneous injection, Inject 10 Units under the skin daily at bedtime (Patient taking differently: Inject 20 Units under the skin daily at bedtime Pt increased his insulin to 20 units ), Disp: 10 mL, Rfl: 0    Januvia 100 MG tablet, TAKE 1 TABLET BY MOUTH EVERY DAY, Disp: 90 tablet, Rfl: 3    lisinopril (ZESTRIL) 40 mg tablet, TAKE 1 TABLET BY MOUTH EVERY DAY, Disp: 90 tablet, Rfl: 3    metoclopramide (REGLAN) 10 mg tablet, Take 1 tablet (10 mg total) by mouth 2 (two) times a day, Disp: 180 tablet, Rfl: 0    Multiple Vitamins-Minerals (multivitamin with minerals) tablet, Take 1 tablet by mouth daily, Disp: , Rfl:     Needle, Disp, (HYPODERMIC NEEDLE) 23G X 1-1/2" MISC, by Does not apply route once a week, Disp: 10 each, Rfl: 6    ondansetron (ZOFRAN) 4 mg tablet, Take 4 mg by mouth every 6 (six) hours as needed, Disp: , Rfl:     Ostomy Supplies MISC, Use in the morning, Disp: 100 each, Rfl: 3    pantoprazole (PROTONIX) 40 mg tablet, TAKE 1 TABLET BY MOUTH TWICE A DAY, Disp: 180 tablet, Rfl: 3  No current facility-administered medications for this visit      Facility-Administered Medications Ordered in Other Visits:     bevacizumab-awwb (MVASI) 500 mg in sodium chloride 0 9 % 100 mL IVPB, 500 mg, Intravenous, Once, Candy Mix MD    dextrose 5 % infusion, 20 mL/hr, Intravenous, Once, Candy Mix MD    fluorouracil (ADRUCIL) injection 875 mg, 400 mg/m2 (Treatment Plan Recorded), Intravenous, Once, Candy Mix MD    iron sucrose (VENOFER) 300 mg in sodium chloride 0 9 % 250 mL IVPB, 300 mg, Intravenous, Once, Candy Mix MD, 300 mg at 04/06/22 0959    leucovorin 876 mg in dextrose 5 % 250 mL IVPB, 400 mg/m2 (Treatment Plan Recorded), Intravenous, Once, Candy Mix MD    ondansetron (ZOFRAN) 16 mg, dexamethasone (DECADRON) 10 mg in sodium chloride 0 9 % 50 mL IVPB, , Intravenous, Once, Candy Mix MD    oxaliplatin (ELOXATIN) 186 15 mg in dextrose 5 % 250 mL chemo infusion, 85 mg/m2 (Treatment Plan Recorded), Intravenous, Once, Candy Mix MD    sodium chloride 0 9 % infusion, 20 mL/hr, Intravenous, Once, Candy Mix MD    Most Recent Lab Results: Checks BG at home: 90-100mg/dL in the AM, ~200mg/dL in the evening   Lab Results   Component Value Date    WBC 7 5 04/01/2022    NEUTROABS 4 6 04/01/2022    IRON 26 (L) 02/20/2022    TIBC 235 (L) 02/20/2022    FERRITIN 125 02/20/2022    CHOLESTEROL 125 08/26/2021    CHOL 91 (L) 04/10/2017    TRIG 75 08/26/2021    HDL 45 08/26/2021    LDLCALC 65 08/26/2021    ALT 25 04/01/2022    AST 48 (H) 04/01/2022    ALB 3 1 (L) 04/01/2022     05/02/2017     04/10/2017    SODIUM 134 04/01/2022    SODIUM 134 03/19/2022    K 4 6 04/01/2022    K 4 4 03/19/2022    CL 98 04/01/2022    BUN 17 04/01/2022    BUN 10 03/19/2022    CREATININE 0 97 04/01/2022    CREATININE 1 03 03/19/2022    EGFR 91 04/01/2022    TSH 0 719 08/26/2021    GLUCOSE 118 (H) 05/02/2017    POCGLU 309 (H) 03/14/2022    GLUF 246 (H) 03/14/2022    GLUC 266 (H) 04/01/2022    HGBA1C 7 5 (H) 02/10/2022    HGBA1C 8 0 (H) 08/26/2021    HGBA1C 9 9 (H) 04/10/2021    CALCIUM 7 9 (L) 03/14/2022       Anthropometric Measurements:   Height: 68"  Ht Readings from Last 1 Encounters:   04/06/22 5' 8" (1 727 m)     Wt Readings from Last 20 Encounters:   04/06/22 104 kg (228 lb 6 4 oz)   04/04/22 103 kg (227 lb)   03/23/22 105 kg (231 lb 11 3 oz)   03/18/22 105 kg (231 lb 3 2 oz)   03/17/22 105 kg (232 lb)   03/14/22 110 kg (241 lb 10 oz)   03/10/22 108 kg (239 lb)   03/07/22 109 kg (239 lb 6 4 oz)   03/02/22 108 kg (239 lb)   03/01/22 109 kg (240 lb)   02/18/22 133 kg (293 lb 3 4 oz)   03/23/21 133 kg (292 lb 9 6 oz)   03/03/20 131 kg (289 lb)   03/02/20 133 kg (293 lb 6 4 oz)   02/04/20 126 kg (277 lb)   02/03/20 127 kg (280 lb)   01/04/20 127 kg (280 lb)   06/11/19 127 kg (279 lb 6 4 oz)   05/16/19 127 kg (279 lb 6 4 oz)   04/26/19 127 kg (279 lb 1 6 oz)       Weight History:    Usual Weight: 290#   Varian: n/a   Home Scale: none     Oncology Nutrition-Anthropometrics      Nutrition from 4/6/2022 in [place] [address] Oncology Dietitian [place] Nutrition from 3/30/2022 in [place] [address] Oncology Dietitian [place]   Patient age (years): 61 years 61 years   Patient (male) height (in): 76 in 76 in   Current weight (lbs): 228 4 lbs 231 7 lbs   Current weight to be used for anthropometric calculations (kg) 103 8 kg 105 3 kg   BMI: 34 7 35 2   IBW male 154 lb 154 lb   IBW (kg) male 70 kg 70 kg   IBW % (male) 148 3 % 150 5 %   Adjusted BW (male): 172 6 lbs 173 4 lbs   Adjusted BW in kg (male): 78 5 kg 78 8 kg   % weight change after 1 week: -- -0 1 %   Weight change after 1 week (lbs) -- -0 3 lbs   % weight change after 1 month: -4 6 % -21 %   Weight change after 1 month (lbs) -11 lbs -61 5 lbs   % weight change after 3 months: -- -20 8 %   Weight change after 3 months (lbs) -- -60 9 lbs   % weight change after 1 year: -21 9 % --   Weight change after 1 year (lbs) -64 2 lbs --          Nutrition-Focused Physical Findings: none observed    Food/Nutrition-Related History & Client/Social History:    Current Nutrition Impact Symptoms:  [] Nausea  [] Reduced Appetite [] Acid Reflux    [] Vomiting  [x] Unintended Wt Loss -significant x1 year [] Malabsorption    [] Diarrhea  [] Unintended Wt Gain  [] Dumping Syndrome    [] Constipation  [] Thick Mucous/Secretions  [] Abdominal Pain    [] Dysgeusia (Altered Taste)  [] Xerostomia (Dry Mouth)  [] Gas    [] Dysosmia (Altered Smell)  [] Thrush  [] Difficulty Chewing    [] Oral Mucositis (Sore Mouth)  [] Fatigue  [x] Hyperglycemia: hba1c 7 5% on 2/10/22; BG random 266mg/dL on 22; BG fasting 246mg/dL on 3/14/22  [] Odynophagia  [] Esophagitis  [] Other:    [] Dysphagia  [] Early Satiety  [] No Problems Eating      Food Allergies & Intolerances: no    Current Diet: Avoiding cold temperatures while on Oxaliplatin and Post- colostomy  Current Nutrition Intake: Increased since last visit  Appetite: Good, Fair   Nutrition Route: PO  Oral Care: brushes BID  Activity level: ADL, feeling more energy this week        24 Hr Diet Recall:   Breakfast: Premier Protein shake, corn flakes, yogurt OR eggs OR toast with extra cream cheese OR french toast  Lunch: tuna salad   Snack: tortilla chips   Dinner: ground beef burrito OR rotisserie chicken, string beans OR salmon OR [employer]: filet, mashed potatoes, green beans   Snack: Vanilla wafers     Beverages: water (12oz x1-3), ginger ale (8oz x1)   Supplements:    Premier Protein Shake (11 oz, 160 kcal, 30 g pro) 1x daily      Oncology Nutrition-Estimated Needs      Nutrition from 2022 in [address] [place] [address] Nutrition from 3/30/2022 in [address] [place] [address]   Weight type used Actual weight Actual weight   Weight in kilograms (kg) used for estimated needs 103 8 kg 105 3 kg   Energy needs formula:  35-40 kcal/kg 35-40 kcal/kg   Energy needs based on 35 kcal/k kcal 3686 kcal   Energy needs based on 40 kcal/k kcal 4213 kcal   Protein needs formula: 1 5-2 g/kg 1 5-2 g/kg   Protein needs based on 1 5 g/kg 156 g 158 g   Protein needs based on 2 g/kg 208 g 211 g   Fluid needs formula: 30-35 mL/kg 30-35 mL/kg   Fluid needs based on 30 mL/kg 3108 mL 3165 mL   Fluid needs in ounces 105 oz 107 oz   Fluid needs based on 35 mL/kg 3626 mL 3693 mL   Fluid needs in ounces 123 oz 125 oz           Discussion & Intervention:   Bernadette Humphries was evaluated today for an RD follow up regarding wt loss and diet post colostomy  Bernadette Humphries is currently undergoing tx for colon cancer  Today Mitesh Patetn explains that he continues to work on increasing the variety of foods he is eating  He recently went to [facility] with his family and ordered a filet, mashed potatoes, and green beans  He was able to tolerate this meal without any GI discomfort  He plans to try veal parmesan this weekend  He has also been adding extra calories to his meals to help maintain his weight  For example, he has been adding extra cream cheese to his toast       Reviewed 24 hour recall, which revealed an adequate po intake, and discussed ways to optimize nutrient intake  Also reviewed the importance of wt management throughout the tx process and the role of a high kcal/ high protein diet in managing wt and overall health  Based on today's assessment, discussion included: MNT for: colostomy, hyperglycemia , how to modify foods for anticipated nutrition impact symptoms pt may experience during CA tx, fortifying foods for added kcal and protein (examples include: adding cheese to foods such as eggs, mashed potatoes, casseroles, etc ; Making oatmeal with whole milk rather than water; Making fortified mashed potatoes with cream, butter, dry milk powder, plain [place] yogurt, and cheese ), adequate hydration & fluid choices, eating smaller more frequent meals every 2-3 hours (5-6 small meals/day), keeping non-perishable foods nearby to snack on, continuing oral nutrition supplements and mindful eating concepts  Moving forward, [employer] to continue trials of new foods    Materials Provided: not applicable  All questions and concerns addressed during todays visit  Diane Leesa has RD contact information  Nutrition Diagnosis:    Increased Nutrient Needs (kcal & pro) related to increased demand for nutrients and disease state as evidenced by cancer dx and pt undergoing tx for cancer   Altered GI Function related to tx for cancer as evidenced by s/p colostomy  Monitoring & Evaluation:   Goals:  · weight maintenance/stabilization  · adequate nutrition impact symptom management  · pt to meet >/=75% estimated nutrition needs daily  · adequate glycemic control    · Progress Towards Goals: Progressing    Nutrition Rx & Recommendations:  · Diet: High Calorie, High Protein (for high calorie foods see pages 52-53, and for high protein foods see pages 49-51 in your Eating Hints book)  · Keep a daily food journal (pen/paper, bossman such as [employer])  · Small, frequent meals/snacks may be easier to tolerate than 3 large daily meals  Aim for 5-6 small meals per day (every 2-3 hours)  · Include protein at all meals/snacks  · Include a variety of foods (as tolerated/allowed by diet)  · Stay hydrated by sipping fluids of choice/tolerance throughout the day  · Alter food choices and eating patterns to accommodate changing needs  · Weigh yourself regularly  If you notice weight loss, make an effort to increase your daily food/calorie intake  If you continue to notice loss after these efforts, reach out to your dietitian to establish a plan to stabilize weight  · Avoid gas-producing foods such as broccoli, cabbage, sauerkraut, [place] sprouts, cauliflower, corn, cucumbers, onions, peppers, beans, peas, lentils, apples, apple juice, avocado, and melons  Carbonated drinks, chewing gum, and drinking through a straw can also cause gas  Limiting lactose may help for those who are sensitive to milk products  · Spread carbohydrate intake throughout the day for better glycemic control    · Continue Premier Protein daily    · For extra calories: add extra sauces/gravies, keep non perishable snacks nearby, choose liquids with calories, add extra cream cheese/cheese to foods as tolerated  Nutrition after colostomy placement:    Diet guidelines for the first 6 weeks:  Eat 5-6 smaller and more frequent meals throughout the day  Try not to skip meals, skipping meals can cause gas  Have your largest meal in the middle of the day  Eating a lot in the evening can cause higher stool output overnight  Choose low fiber foods  Try to eat less that 10g of fiber per day  After you have healed from surgery you can gradually add fiber back into your diet  Chew your foods well  Avoid spicy, acidic, fried, and greasy foods  These foods are harder to digest and can cause discomfort  Limit simple sugars such as desserts and sugar sweetened beverages  Foods high in added sugars can cause diarrhea  Stay hydrated with at least 8-10 cups of liquid daily  Drink liquids 30 minutes after meals to avoid flushing foods through too quickly  If ostomy output is higher than usual, try to drink more fluids to avoid dehydration  After 6 weeks:  After 6 weeks post surgery you can start adding foods that you have been avoiding and back into your diet  Add new foods slowly, one at a time, and chew them well  Food choices and their effects:   Foods that may cause diarrhea or increased output: fresh or raw fruit, fresh or raw vegetables, fried or spicy foods, high sugar beverages, legumes, milk and dairy foods, prune juice, sugar alcohols (sorbitol, mannitol, xylitol)  Foods that may help thicken stool: applesauce, bananas, cheese, marshmallows, oatmeal, creamy peanut butter, potatoes (no skin), tapioca pudding, white bread, pretzels, saltine crackers, white rice, white pasta/noodles, yogurt     Foods that may cause gas or odor: eggs, onions, peanuts, legumes, carbonated drinks, cruciferous vegetables (Brussel's sprouts, cauliflower, broccoli, cabbage), chewing gum  Foods that may help relieve gas and odor: buttermilk, cranberry juice, parsley, yogurt with active cultures  See "Diet and Nutrition After Colostomy" handout for additional suggestions on foods to choose and foods to avoid/limit after ostomy placement           Follow Up Plan: During infusion 4/20/22  Recommend Referral to Other Providers: none at this time

## 2022-04-06 NOTE — PROGRESS NOTES
Chemo and venofer infusions tolerated well without complications  No complaints offered  AVS declined

## 2022-04-06 NOTE — PLAN OF CARE
Problem: Knowledge Deficit  Goal: Patient/family/caregiver demonstrates understanding of disease process, treatment plan, medications, and discharge instructions  Description: Complete learning assessment and assess knowledge base    Interventions:  - Provide teaching at level of understanding  - Provide teaching via preferred learning methods  Outcome: Progressing     Problem: GASTROINTESTINAL - ADULT  Goal: Minimal or absence of nausea and/or vomiting  Description: INTERVENTIONS:  - Administer ordered antiemetic medications as needed  - Provide nonpharmacologic comfort measures as appropriate  - Consider nutrition services referral to assist patient with adequate nutrition and appropriate food choices  Outcome: Progressing     Problem: HEMATOLOGIC - ADULT  Goal: Maintains hematologic stability  Description: INTERVENTIONS  - Administer Venofer as ordered  Outcome: Progressing

## 2022-04-08 ENCOUNTER — HOSPITAL ENCOUNTER (OUTPATIENT)
Dept: INFUSION CENTER | Facility: CLINIC | Age: 57
Discharge: HOME | End: 2022-04-08

## 2022-04-08 DIAGNOSIS — C18.4 MALIGNANT NEOPLASM OF TRANSVERSE COLON (HCC): Primary | ICD-10-CM

## 2022-04-08 NOTE — PROGRESS NOTES
pt presents for CADD disconnect  Res Vol 0  CADD beeping  Cadd disconnected, Port flushed, + blood return, Saline locked  AVS declined  Pt aware of next appt  DC in stable condition  CADD returned to [facility] for return to [facility]

## 2022-04-08 NOTE — PLAN OF CARE
Problem: Potential for Falls  Goal: Patient will remain free of falls  Description: INTERVENTIONS:  - Educate patient/family on patient safety including physical limitations  - Instruct patient to call for assistance with activity   -  Problem: Knowledge Deficit  Goal: Patient/family/caregiver demonstrates understanding of disease process, treatment plan, medications, and discharge instructions  Description: Complete learning assessment and assess knowledge base    Interventions:  - Provide teaching at level of understanding  - Provide teaching via preferred learning methods  Outcome: Progressing     Outcome: Progressing

## 2022-04-11 DIAGNOSIS — C18.4 MALIGNANT NEOPLASM OF TRANSVERSE COLON (HCC): Primary | ICD-10-CM

## 2022-04-12 DIAGNOSIS — E11.9 TYPE 2 DIABETES MELLITUS WITHOUT COMPLICATION, WITHOUT LONG-TERM CURRENT USE OF INSULIN (HCC): ICD-10-CM

## 2022-04-13 ENCOUNTER — HOSPITAL ENCOUNTER (OUTPATIENT)
Dept: INFUSION CENTER | Facility: CLINIC | Age: 57
Discharge: HOME | End: 2022-04-13
Payer: COMMERCIAL

## 2022-04-13 VITALS
SYSTOLIC BLOOD PRESSURE: 143 MMHG | HEART RATE: 81 BPM | DIASTOLIC BLOOD PRESSURE: 82 MMHG | TEMPERATURE: 97.8 F | RESPIRATION RATE: 18 BRPM

## 2022-04-13 DIAGNOSIS — D50.0 IRON DEFICIENCY ANEMIA DUE TO CHRONIC BLOOD LOSS: Primary | ICD-10-CM

## 2022-04-13 PROCEDURE — 96365 THER/PROPH/DIAG IV INF INIT: CPT

## 2022-04-13 RX ORDER — SODIUM CHLORIDE 9 MG/ML
20 INJECTION, SOLUTION INTRAVENOUS ONCE
Status: COMPLETED | OUTPATIENT
Start: 2022-04-13 | End: 2022-04-13

## 2022-04-13 RX ORDER — INSULIN GLARGINE 100 [IU]/ML
30 INJECTION, SOLUTION SUBCUTANEOUS DAILY
Qty: 15 ML | Refills: 1 | Status: SHIPPED | OUTPATIENT
Start: 2022-04-13 | End: 2022-07-26

## 2022-04-13 RX ORDER — SODIUM CHLORIDE 9 MG/ML
20 INJECTION, SOLUTION INTRAVENOUS ONCE
Status: CANCELLED | OUTPATIENT
Start: 2022-04-20

## 2022-04-13 RX ADMIN — SODIUM CHLORIDE 20 ML/HR: 0.9 INJECTION, SOLUTION INTRAVENOUS at 14:11

## 2022-04-13 RX ADMIN — IRON SUCROSE 300 MG: 20 INJECTION, SOLUTION INTRAVENOUS at 14:20

## 2022-04-13 NOTE — PROGRESS NOTES
Pt to clinic for venofer, offers no complaints today, tolerated infusion without complications, aware of next appointment, port de-accessed, avs declined

## 2022-04-15 DIAGNOSIS — C18.4 MALIGNANT NEOPLASM OF TRANSVERSE COLON (HCC): Primary | ICD-10-CM

## 2022-04-15 NOTE — PROGRESS NOTES
Pt called in requesting for blood work scripts  Updated labs and will email lab scripts to email per pt request   Pt appreciative

## 2022-04-17 LAB
ALBUMIN SERPL-MCNC: 3.4 G/DL (ref 3.8–4.9)
ALBUMIN/GLOB SERPL: 1.2 {RATIO} (ref 1.2–2.2)
ALP SERPL-CCNC: 322 IU/L (ref 44–121)
ALT SERPL-CCNC: 22 IU/L (ref 0–44)
APPEARANCE UR: CLEAR
AST SERPL-CCNC: 40 IU/L (ref 0–40)
BACTERIA URNS QL MICRO: NORMAL
BASOPHILS # BLD AUTO: 0.1 X10E3/UL (ref 0–0.2)
BASOPHILS NFR BLD AUTO: 1 %
BILIRUB SERPL-MCNC: 0.4 MG/DL (ref 0–1.2)
BILIRUB UR QL STRIP: NEGATIVE
BUN SERPL-MCNC: 20 MG/DL (ref 6–24)
BUN/CREAT SERPL: 17 (ref 9–20)
CALCIUM SERPL-MCNC: 8.9 MG/DL (ref 8.7–10.2)
CASTS URNS QL MICRO: NORMAL /LPF
CHLORIDE SERPL-SCNC: 100 MMOL/L (ref 96–106)
CO2 SERPL-SCNC: 18 MMOL/L (ref 20–29)
COLOR UR: YELLOW
CREAT SERPL-MCNC: 1.18 MG/DL (ref 0.76–1.27)
EGFR: 72 ML/MIN/1.73
EOSINOPHIL # BLD AUTO: 0.4 X10E3/UL (ref 0–0.4)
EOSINOPHIL NFR BLD AUTO: 6 %
EPI CELLS #/AREA URNS HPF: NORMAL /HPF (ref 0–10)
ERYTHROCYTE [DISTWIDTH] IN BLOOD BY AUTOMATED COUNT: 23.2 % (ref 11.6–15.4)
GLOBULIN SER-MCNC: 2.8 G/DL (ref 1.5–4.5)
GLUCOSE SERPL-MCNC: 277 MG/DL (ref 65–99)
GLUCOSE UR QL: ABNORMAL
HCT VFR BLD AUTO: 31.6 % (ref 37.5–51)
HGB BLD-MCNC: 10.1 G/DL (ref 13–17.7)
HGB UR QL STRIP: NEGATIVE
KETONES UR QL STRIP: ABNORMAL
LEUKOCYTE ESTERASE UR QL STRIP: NEGATIVE
LYMPHOCYTES # BLD AUTO: 2.1 X10E3/UL (ref 0.7–3.1)
LYMPHOCYTES NFR BLD AUTO: 31 %
MCH RBC QN AUTO: 25.3 PG (ref 26.6–33)
MCHC RBC AUTO-ENTMCNC: 32 G/DL (ref 31.5–35.7)
MCV RBC AUTO: 79 FL (ref 79–97)
MICRO URNS: ABNORMAL
MONOCYTES # BLD AUTO: 0.7 X10E3/UL (ref 0.1–0.9)
MONOCYTES NFR BLD AUTO: 10 %
MORPHOLOGY BLD-IMP: ABNORMAL
NEUTROPHILS # BLD AUTO: 3.5 X10E3/UL (ref 1.4–7)
NEUTROPHILS NFR BLD AUTO: 52 %
NITRITE UR QL STRIP: NEGATIVE
PH UR STRIP: 5 [PH] (ref 5–7.5)
PLATELET # BLD AUTO: 384 X10E3/UL (ref 150–450)
POTASSIUM SERPL-SCNC: 4.2 MMOL/L (ref 3.5–5.2)
PROT SERPL-MCNC: 6.2 G/DL (ref 6–8.5)
PROT UR QL STRIP: ABNORMAL
RBC # BLD AUTO: 3.99 X10E6/UL (ref 4.14–5.8)
RBC #/AREA URNS HPF: NORMAL /HPF (ref 0–2)
SODIUM SERPL-SCNC: 137 MMOL/L (ref 134–144)
SP GR UR: >=1.03 (ref 1–1.03)
UROBILINOGEN UR STRIP-ACNC: 1 MG/DL (ref 0.2–1)
WBC # BLD AUTO: 6.8 X10E3/UL (ref 3.4–10.8)
WBC #/AREA URNS HPF: NORMAL /HPF (ref 0–5)

## 2022-04-20 ENCOUNTER — PATIENT OUTREACH (OUTPATIENT)
Dept: CASE MANAGEMENT | Facility: HOSPITAL | Age: 57
End: 2022-04-20

## 2022-04-20 ENCOUNTER — HOSPITAL ENCOUNTER (OUTPATIENT)
Dept: INFUSION CENTER | Facility: CLINIC | Age: 57
Discharge: HOME | End: 2022-04-20
Payer: COMMERCIAL

## 2022-04-20 ENCOUNTER — NUTRITION (OUTPATIENT)
Dept: NUTRITION | Facility: CLINIC | Age: 57
End: 2022-04-20

## 2022-04-20 VITALS
TEMPERATURE: 96.8 F | HEART RATE: 64 BPM | WEIGHT: 229.2 LBS | HEIGHT: 68 IN | RESPIRATION RATE: 18 BRPM | DIASTOLIC BLOOD PRESSURE: 81 MMHG | SYSTOLIC BLOOD PRESSURE: 131 MMHG | BODY MASS INDEX: 34.74 KG/M2

## 2022-04-20 DIAGNOSIS — Z71.3 NUTRITIONAL COUNSELING: Primary | ICD-10-CM

## 2022-04-20 DIAGNOSIS — C18.4 MALIGNANT NEOPLASM OF TRANSVERSE COLON (HCC): Primary | ICD-10-CM

## 2022-04-20 PROCEDURE — 96415 CHEMO IV INFUSION ADDL HR: CPT

## 2022-04-20 PROCEDURE — 96368 THER/DIAG CONCURRENT INF: CPT

## 2022-04-20 PROCEDURE — G0498 CHEMO EXTEND IV INFUS W/PUMP: HCPCS

## 2022-04-20 PROCEDURE — 96367 TX/PROPH/DG ADDL SEQ IV INF: CPT

## 2022-04-20 PROCEDURE — 96417 CHEMO IV INFUS EACH ADDL SEQ: CPT

## 2022-04-20 PROCEDURE — 96411 CHEMO IV PUSH ADDL DRUG: CPT

## 2022-04-20 PROCEDURE — 96413 CHEMO IV INFUSION 1 HR: CPT

## 2022-04-20 RX ORDER — DEXTROSE MONOHYDRATE 50 MG/ML
20 INJECTION, SOLUTION INTRAVENOUS ONCE
Status: COMPLETED | OUTPATIENT
Start: 2022-04-20 | End: 2022-04-20

## 2022-04-20 RX ORDER — SODIUM CHLORIDE 9 MG/ML
20 INJECTION, SOLUTION INTRAVENOUS ONCE
Status: COMPLETED | OUTPATIENT
Start: 2022-04-20 | End: 2022-04-20

## 2022-04-20 RX ORDER — FLUOROURACIL 50 MG/ML
400 INJECTION, SOLUTION INTRAVENOUS ONCE
Status: COMPLETED | OUTPATIENT
Start: 2022-04-20 | End: 2022-04-20

## 2022-04-20 RX ADMIN — OXALIPLATIN 186.15 MG: 5 INJECTION, SOLUTION INTRAVENOUS at 12:06

## 2022-04-20 RX ADMIN — DEXTROSE 20 ML/HR: 5 SOLUTION INTRAVENOUS at 12:00

## 2022-04-20 RX ADMIN — DEXAMETHASONE SODIUM PHOSPHATE: 10 INJECTION, SOLUTION INTRAMUSCULAR; INTRAVENOUS at 10:27

## 2022-04-20 RX ADMIN — BEVACIZUMAB-AWWB 500 MG: 400 INJECTION, SOLUTION INTRAVENOUS at 11:09

## 2022-04-20 RX ADMIN — SODIUM CHLORIDE 20 ML/HR: 9 INJECTION, SOLUTION INTRAVENOUS at 10:15

## 2022-04-20 RX ADMIN — FLUOROURACIL 875 MG: 50 INJECTION, SOLUTION INTRAVENOUS at 14:06

## 2022-04-20 RX ADMIN — LEUCOVORIN CALCIUM 876 MG: 350 INJECTION, POWDER, LYOPHILIZED, FOR SOLUTION INTRAMUSCULAR; INTRAVENOUS at 12:06

## 2022-04-20 NOTE — PROGRESS NOTES
Notified from Infusion in regards to patient Urine protein resulted at 3+  Per report from patient to Infusion RN that Patient does drink 30 gram protein shakes a day and "Feels great " Reviewed with Dr Abigail alberts for patient to proceed with treatment today on 4/20/22 for Mvasi

## 2022-04-20 NOTE — PROGRESS NOTES
Outpatient Oncology Nutrition Consultation   Type of Consult: Follow Up  Care Location: Infusion Center    Reason for referral: From Infusion RN on 3/23/22 (reason for referral: Malnutrition Screening Tool (MST) Triggers: scored a 4 indicating >/=34# (>/=15 4 kg) recent wt loss and is eating poorly due to a decreased appetite  Date of MST: 3/23/22 )    Nutrition Assessment:   Oncology Diagnosis & Treatments: Diagnosed with colon cancer 3/1/22  S/p colostomy 2/20/22  Began chemotherapy (adrucil, oxaliplatin, mvasi) 3/23/22      Oncology History   Malignant neoplasm of transverse colon ([facility] [address] )   3/1/2022 Initial Diagnosis    Malignant neoplasm of transverse colon ([place] [id number] )     3/23/2022 -  Chemotherapy    fluorouracil (ADRUCIL), 400 mg/m2 = 905 mg, Intravenous, Once, 3 of 6 cycles  Administration: 905 mg (3/23/2022), 875 mg (4/6/2022)  leucovorin calcium IVPB, 904 mg, Intravenous, Once, 3 of 6 cycles  Administration: 900 mg (3/23/2022), 876 mg (4/6/2022)  oxaliplatin (ELOXATIN) chemo infusion, 85 mg/m2 = 192 1 mg, Intravenous, Once, 3 of 6 cycles  Administration: 192 1 mg (3/23/2022), 186 15 mg (4/6/2022)  fluorouracil (ADRUCIL) ambulatory infusion Soln, 1,200 mg/m2/day = 5,425 mg, Intravenous, Over 46 hours, 3 of 6 cycles  bevacizumab-awwb (MVASI) IVPB, 5 mg/kg = 545 mg (100 % of original dose 5 mg/kg), Intravenous, Once, 3 of 6 cycles  Dose modification: 5 mg/kg (original dose 5 mg/kg, Cycle 1)  Administration: 545 mg (3/23/2022), 500 mg (4/6/2022)       Past Medical & Surgical Hx: T2DM, gastroparesis, HLD, CKD, GERD,   Patient Active Problem List   Diagnosis    Type 2 diabetes mellitus without complication, without long-term current use of insulin (HCC)    Benign essential hypertension    Mixed hyperlipidemia    Erectile dysfunction    Low testosterone    Testicular hypogonadism    Morbid obesity ([facility] [address] )    Incarcerated umbilical hernia    Left ureteral calculus    Type 2 diabetes mellitus with hyperlipidemia ([place] [address] )    Colonic mass    Microcytic anemia    Hypokalemia    Transaminitis    Stage 3a chronic kidney disease (HCC)    Thrombocytosis    Iron deficiency anemia, unspecified    Malignant neoplasm of transverse colon (HCC)    Cancer of transverse colon ([place] [address] )    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)    Colon cancer metastasized to liver ([place] [address] )    SIRS (systemic inflammatory response syndrome) ([place] [address] )     Past Medical History:   Diagnosis Date    Abdominal pain 3/12/2022    Acute renal failure ([place] [address] )     [id number] resolved    Diabetes mellitus ([place] [address] )     Enteritis 08/23/2016    Gastroparesis due to DM ([place] [address] ) 08/23/2016    GERD (gastroesophageal reflux disease)     Hernia, ventral 08/04/2016    Hyperlipidemia     Hypertension     Postoperative visit 3/2/2022     Past Surgical History:   Procedure Laterality Date    COLOSTOMY N/A 2/20/2022    Procedure: COLOSTOMY LOOP, diverting;  Surgeon: Elizabeth Stapleton MD;  [facility];  Service: General    ESOPHAGOGASTRODUODENOSCOPY N/A 8/24/2016    Procedure: ESOPHAGOGASTRODUODENOSCOPY (EGD); Surgeon: Brown Luciano MD;  [facility]; Service:     IR PORT PLACEMENT  3/10/2022    KIDNEY STONE SURGERY      LIVER BIOPSY LAPAROSCOPIC N/A 2/20/2022    Procedure: DIAGNOSTIC LAPAROSCOPIC LIVER BIOPSY, DIVERTING LOOP COLOSTOMY ;  Surgeon: Elizabeth Stapleton MD;  [facility];  Service: General    TONSILLECTOMY      UMBILICAL HERNIA REPAIR      UMBILICAL HERNIA REPAIR LAPAROSCOPIC N/A 4/26/2019    Procedure: LAPAROSCOPIC UMBILICAL HERNIA REPAIR;  Surgeon: Elizabeth Stapleton MD;  [facility];  Service: General       Review of Medications:   Vitamins, Supplements and Herbals: Med List Reviewed & pt is only taking: vitamin D 400IU    Current Outpatient Medications:     amLODIPine (NORVASC) 5 mg tablet, TAKE 1 TABLET BY MOUTH TWICE A DAY, Disp: 180 tablet, Rfl: 0    aspirin 81 MG tablet, Take 81 mg by mouth daily  , Disp: , Rfl:   atorvastatin (LIPITOR) 40 mg tablet, TAKE 1 TABLET BY MOUTH EVERY DAY, Disp: 90 tablet, Rfl: 3    B-D UF III MINI PEN NEEDLES 31G X 5 MM MISC, BY DOES NOT APPLY ROUTE 4 (FOUR) TIMES A DAY, Disp: 100 each, Rfl: 3    Cholecalciferol (VITAMIN D3 PO), Take 400 Units by mouth daily, Disp: , Rfl:     Continuous Blood Gluc Sensor (FreeStyle Parrish 14 Day Sensor) MISC, Use 1 each every 14 (fourteen) days, Disp: 2 each, Rfl: 6    fluorouracil 5,255 mg in CADD infusion pump, Infuse 5,255 mg (1,200 mg/m2/day x 2 19 m2) into a venous catheter over 46 hours for 2 days  Do not start before April 20, 2022 , Disp: 1 each, Rfl: 0    glyBURIDE-metFORMIN (GLUCOVANCE) 5-500 MG per tablet, Take 1 tablet by mouth daily with breakfast Taking 1 tablet in the morning , Disp: , Rfl:     insulin glargine (Lantus) 100 units/mL subcutaneous injection, Inject 10 Units under the skin daily at bedtime (Patient taking differently: Inject 20 Units under the skin daily at bedtime Pt increased his insulin to 20 units ), Disp: 10 mL, Rfl: 0    Januvia 100 MG tablet, TAKE 1 TABLET BY MOUTH EVERY DAY, Disp: 90 tablet, Rfl: 3    Lantus SoloStar 100 units/mL injection pen, INJECT 30 UNITS UNDER THE SKIN DAILY, Disp: 15 mL, Rfl: 1    lisinopril (ZESTRIL) 40 mg tablet, TAKE 1 TABLET BY MOUTH EVERY DAY, Disp: 90 tablet, Rfl: 3    metoclopramide (REGLAN) 10 mg tablet, Take 1 tablet (10 mg total) by mouth 2 (two) times a day, Disp: 180 tablet, Rfl: 0    Multiple Vitamins-Minerals (multivitamin with minerals) tablet, Take 1 tablet by mouth daily, Disp: , Rfl:     Needle, Disp, (HYPODERMIC NEEDLE) 23G X 1-1/2" MISC, by Does not apply route once a week, Disp: 10 each, Rfl: 6    ondansetron (ZOFRAN) 4 mg tablet, Take 4 mg by mouth every 6 (six) hours as needed, Disp: , Rfl:     Ostomy Supplies MISC, Use in the morning, Disp: 100 each, Rfl: 3    pantoprazole (PROTONIX) 40 mg tablet, TAKE 1 TABLET BY MOUTH TWICE A DAY, Disp: 180 tablet, Rfl: 3  No current facility-administered medications for this visit      Facility-Administered Medications Ordered in Other Visits:     bevacizumab-awwb (MVASI) 500 mg in sodium chloride 0 9 % 100 mL IVPB, 500 mg, Intravenous, Once, Adrienne Leach MD    dextrose 5 % infusion, 20 mL/hr, Intravenous, Once, Adrienne Leach MD    fluorouracil (ADRUCIL) injection 875 mg, 400 mg/m2 (Treatment Plan Recorded), Intravenous, Once, Adrienne Leach MD    leucovorin 876 mg in dextrose 5 % 250 mL IVPB, 400 mg/m2 (Treatment Plan Recorded), Intravenous, Once, Adrienne Leach MD    oxaliplatin (ELOXATIN) 186 15 mg in dextrose 5 % 250 mL chemo infusion, 85 mg/m2 (Treatment Plan Recorded), Intravenous, Once, Adrienne eLach MD    Most Recent Lab Results: Checks BG at home: 90-100mg/dL in the AM, ~200mg/dL in the evening   Lab Results   Component Value Date    WBC 6 8 04/15/2022    NEUTROABS 3 5 04/15/2022    IRON 26 (L) 02/20/2022    TIBC 235 (L) 02/20/2022    FERRITIN 125 02/20/2022    CHOLESTEROL 125 08/26/2021    CHOL 91 (L) 04/10/2017    TRIG 75 08/26/2021    HDL 45 08/26/2021    LDLCALC 65 08/26/2021    ALT 22 04/15/2022    AST 40 04/15/2022    ALB 3 4 (L) 04/15/2022     05/02/2017     04/10/2017    SODIUM 137 04/15/2022    SODIUM 134 04/01/2022    K 4 2 04/15/2022    K 4 6 04/01/2022     04/15/2022    BUN 20 04/15/2022    BUN 17 04/01/2022    CREATININE 1 18 04/15/2022    CREATININE 0 97 04/01/2022    EGFR 72 04/15/2022    TSH 0 719 08/26/2021    GLUCOSE 118 (H) 05/02/2017    POCGLU 309 (H) 03/14/2022    GLUF 246 (H) 03/14/2022    GLUC 277 (H) 04/15/2022    HGBA1C 7 5 (H) 02/10/2022    HGBA1C 8 0 (H) 08/26/2021    HGBA1C 9 9 (H) 04/10/2021    CALCIUM 7 9 (L) 03/14/2022       Anthropometric Measurements:   Height: 68"  Ht Readings from Last 1 Encounters:   04/20/22 5' 7 99" (1 727 m)     Wt Readings from Last 20 Encounters:   04/20/22 104 kg (229 lb 3 2 oz)   04/06/22 104 kg (228 lb 6 4 oz)   04/04/22 103 kg (227 lb) 03/23/22 105 kg (231 lb 11 3 oz)   03/18/22 105 kg (231 lb 3 2 oz)   03/17/22 105 kg (232 lb)   03/14/22 110 kg (241 lb 10 oz)   03/10/22 108 kg (239 lb)   03/07/22 109 kg (239 lb 6 4 oz)   03/02/22 108 kg (239 lb)   03/01/22 109 kg (240 lb)   02/18/22 133 kg (293 lb 3 4 oz)   03/23/21 133 kg (292 lb 9 6 oz)   03/03/20 131 kg (289 lb)   03/02/20 133 kg (293 lb 6 4 oz)   02/04/20 126 kg (277 lb)   02/03/20 127 kg (280 lb)   01/04/20 127 kg (280 lb)   06/11/19 127 kg (279 lb 6 4 oz)   05/16/19 127 kg (279 lb 6 4 oz)       Weight History:   Usual Weight: 290#  Varian: n/a  Home Scale: none     Oncology Nutrition-Anthropometrics      Nutrition from 4/20/2022 in [place] [address] Oncology Dietitian [place] Nutrition from 4/6/2022 in [place] [address] Oncology Dietitian [place]   Patient age (years): 61 years 61 years   Patient (male) height (in): 76 in 76 in   Current weight (lbs): 229 2 lbs 228 4 lbs   Current weight to be used for anthropometric calculations (kg) 104 2 kg 103 8 kg   BMI: 34 8 34 7   IBW male 154 lb 154 lb   IBW (kg) male 70 kg 70 kg   IBW % (male) 148 8 % 148 3 %   Adjusted BW (male): 172 8 lbs 172 6 lbs   Adjusted BW in kg (male): 78 5 kg 78 5 kg   % weight change after 1 month: -1 1 % -4 6 %   Weight change after 1 month (lbs) -2 5 lbs -11 lbs   % weight change after 3 months: -18 1 % --   Weight change after 3 months (lbs) -50 8 lbs --   % weight change after 1 year: -- -21 9 %   Weight change after 1 year (lbs) -- -64 2 lbs          Nutrition-Focused Physical Findings: none observed    Food/Nutrition-Related History & Client/Social History:    Current Nutrition Impact Symptoms:  [] Nausea  [] Reduced Appetite  [] Acid Reflux    [] Vomiting  [x] Unintended Wt Loss -significant x3 months  [] Malabsorption    [] Diarrhea  [] Unintended Wt Gain  [] Dumping Syndrome    [] Constipation  [] Thick Mucous/Secretions  [] Abdominal Pain    [] Dysgeusia (Altered Taste)  [] Xerostomia (Dry Mouth)  [] Gas    [] Dysosmia (Altered Smell)  [] Ramiro   [] Difficulty Chewing    [] Oral Mucositis (Sore Mouth)  [] Fatigue  [x] Hyperglycemia: hba1c 7 5% on 2/10/22; BG random 277mg/dL on 4/15/22; BG fasting 246mg/dL on 3/14/22  [] Odynophagia  [] Esophagitis  [] Other:    [] Dysphagia  [] Early Satiety  [] No Problems Eating      Food Allergies & Intolerances: no    Current Diet: Avoiding cold temperatures while on Oxaliplatin and reintroducing foods post- colostomy  Current Nutrition Intake: Unchanged from last visit  Appetite: Good  Nutrition Route: PO  Oral Care: brushes BID  Activity level: ADL, feeling more energy recently         24 Hr Diet Recall:   Breakfast: Premier Protein shake, corn flakes, yogurt OR eggs OR toast with extra cream cheese OR french toast  Lunch: tuna sandwich   Snack: tortilla chips   Dinner: ground beef burrito OR red meat, chicken, or salmon with carrots/string beans/rice    Snack: Vanilla wafers     Beverages: water (12oz x1-3), ginger ale (8oz x1)   Supplements:   Premier Protein Shake (11 oz, 160 kcal, 30 g pro) 1x daily      Oncology Nutrition-Estimated Needs      Nutrition from 2022 in [address] [place] [address] Nutrition from 2022 in [address] [place] [address]   Weight type used Actual weight Actual weight   Weight in kilograms (kg) used for estimated needs 104 2 kg 103 8 kg   Energy needs formula:  35-40 kcal/kg 35-40 kcal/kg   Energy needs based on 35 kcal/k kcal 3634 kcal   Energy needs based on 40 kcal/k kcal 4153 kcal   Protein needs formula: 1 5-2 g/kg 1 5-2 g/kg   Protein needs based on 1 5 g/kg 156 g 156 g   Protein needs based on 2 g/kg 208 g 208 g   Fluid needs formula: 30-35 mL/kg 30-35 mL/kg   Fluid needs based on 30 mL/kg 3123 mL 3108 mL   Fluid needs in ounces 106 oz 105 oz   Fluid needs based on 35 mL/kg 3644 mL 3626 mL   Fluid needs in ounces 123 oz 123 oz           Discussion & Intervention:   Rivas Puckett was evaluated today for an RD follow up regarding wt loss and diet post colostomy  Ivon Campbell is currently undergoing tx for colon cancer  Today Sharlene Simmons explains that he continues to work on increasing the variety of foods he is eating  He has not been having any discomfort with the foods that he has been trying, he has been able to increase his intake of meats and cooked vegetables  Reviewed 24 hour recall, which revealed an adequate po intake, and discussed ways to optimize nutrient intake  Also reviewed the importance of wt management throughout the tx process and the role of a high kcal/ high protein diet in managing wt and overall health  Based on today's assessment, discussion included: MNT for: colostomy, hyperglycemia , how to modify foods for anticipated nutrition impact symptoms pt may experience during CA tx, adequate hydration & fluid choices, eating smaller more frequent meals every 2-3 hours (5-6 small meals/day), keeping non-perishable foods nearby to snack on, continuing oral nutrition supplements and mindful eating concepts  Moving forward, [employer] to continue trials of new foods  Materials Provided: not applicable  All questions and concerns addressed during todays visit  Ivon Campbell has RD contact information  Nutrition Diagnosis:   Increased Nutrient Needs (kcal & pro) related to increased demand for nutrients and disease state as evidenced by cancer dx and pt undergoing tx for cancer  Altered GI Function related to tx for cancer as evidenced by s/p colostomy    Monitoring & Evaluation:   Goals:  weight maintenance/stabilization  adequate nutrition impact symptom management  pt to meet >/=75% estimated nutrition needs daily  adequate glycemic control    Progress Towards Goals: Progressing    Nutrition Rx & Recommendations:  Diet: High Calorie, High Protein (for high calorie foods see pages 52-53, and for high protein foods see pages 49-51 in your Eating Hints book)  Keep a daily food journal (pen/paper, bossman such as [employer])  Small, frequent meals/snacks may be easier to tolerate than 3 large daily meals  Aim for 5-6 small meals per day (every 2-3 hours)  Include protein at all meals/snacks  Include a variety of foods (as tolerated/allowed by diet)  Stay hydrated by sipping fluids of choice/tolerance throughout the day  Alter food choices and eating patterns to accommodate changing needs  Weigh yourself regularly  If you notice weight loss, make an effort to increase your daily food/calorie intake  If you continue to notice loss after these efforts, reach out to your dietitian to establish a plan to stabilize weight  Avoid gas-producing foods such as broccoli, cabbage, sauerkraut, [place] sprouts, cauliflower, corn, cucumbers, onions, peppers, beans, peas, lentils, apples, apple juice, avocado, and melons  Carbonated drinks, chewing gum, and drinking through a straw can also cause gas  Limiting lactose may help for those who are sensitive to milk products  Spread carbohydrate intake throughout the day for glycemic control  Continue Premier Protein daily  For extra calories: add extra sauces/gravies, keep non perishable snacks nearby, choose liquids with calories, add extra cream cheese/cheese to foods as tolerated  Nutrition after colostomy placement:  After 6 weeks post surgery you can start adding foods that you have been avoiding and back into your diet  Add new foods slowly, one at a time, and chew them well  Food choices and their effects:   Foods that may cause diarrhea or increased output: fresh or raw fruit, fresh or raw vegetables, fried or spicy foods, high sugar beverages, legumes, milk and dairy foods, prune juice, sugar alcohols (sorbitol, mannitol, xylitol)     Foods that may help thicken stool: applesauce, bananas, cheese, marshmallows, oatmeal, creamy peanut butter, potatoes (no skin), tapioca pudding, white bread, pretzels, saltine crackers, white rice, white pasta/noodles, yogurt  Foods that may cause gas or odor: eggs, onions, peanuts, legumes, carbonated drinks, cruciferous vegetables (Brussel's sprouts, cauliflower, broccoli, cabbage), chewing gum  Foods that may help relieve gas and odor: buttermilk, cranberry juice, parsley, yogurt with active cultures  See "Diet and Nutrition After Colostomy" handout for additional suggestions on foods to choose and foods to avoid/limit after ostomy placement       Follow Up Plan: During infusion 5/4/22  Recommend Referral to Other Providers: none at this time

## 2022-04-20 NOTE — PATIENT INSTRUCTIONS
Nutrition Rx & Recommendations:  · Diet: High Calorie, High Protein (for high calorie foods see pages 52-53, and for high protein foods see pages 49-51 in your Eating Hints book)  · Keep a daily food journal (pen/paper, bossman such as [employer])  · Small, frequent meals/snacks may be easier to tolerate than 3 large daily meals  Aim for 5-6 small meals per day (every 2-3 hours)  · Include protein at all meals/snacks  · Include a variety of foods (as tolerated/allowed by diet)  · Stay hydrated by sipping fluids of choice/tolerance throughout the day  · Alter food choices and eating patterns to accommodate changing needs  · Weigh yourself regularly  If you notice weight loss, make an effort to increase your daily food/calorie intake  If you continue to notice loss after these efforts, reach out to your dietitian to establish a plan to stabilize weight  · Avoid gas-producing foods such as broccoli, cabbage, sauerkraut, [place] sprouts, cauliflower, corn, cucumbers, onions, peppers, beans, peas, lentils, apples, apple juice, avocado, and melons  Carbonated drinks, chewing gum, and drinking through a straw can also cause gas  Limiting lactose may help for those who are sensitive to milk products  · Spread carbohydrate intake throughout the day for glycemic control  · Continue Premier Protein daily  · For extra calories: add extra sauces/gravies, keep non perishable snacks nearby, choose liquids with calories, add extra cream cheese/cheese to foods as tolerated  Nutrition after colostomy placement:   After 6 weeks post surgery you can start adding foods that you have been avoiding and back into your diet   Add new foods slowly, one at a time, and chew them well         Food choices and their effects:    Foods that may cause diarrhea or increased output: fresh or raw fruit, fresh or raw vegetables, fried or spicy foods, high sugar beverages, legumes, milk and dairy foods, prune juice, sugar alcohols (sorbitol, mannitol, xylitol)   Foods that may help thicken stool: applesauce, bananas, cheese, marshmallows, oatmeal, creamy peanut butter, potatoes (no skin), tapioca pudding, white bread, pretzels, saltine crackers, white rice, white pasta/noodles, yogurt   Foods that may cause gas or odor: eggs, onions, peanuts, legumes, carbonated drinks, cruciferous vegetables (Brussel's sprouts, cauliflower, broccoli, cabbage), chewing gum   Foods that may help relieve gas and odor: buttermilk, cranberry juice, parsley, yogurt with active cultures   See "Diet and Nutrition After Colostomy" handout for additional suggestions on foods to choose and foods to avoid/limit after ostomy placement       Follow Up Plan: During infusion 5/4/22  Recommend Referral to Other Providers: none at this time

## 2022-04-20 NOTE — PROGRESS NOTES
Pt here for chemotherapy, offering no complaints  Right port accessed with positive blood return noted throughout treatment  Tolerated infusion without incident  Port flushed and CADD pump double checked by Anthony Robertson RN, green light flashing and pump running  AVS declined  PT aware of appt time Friday    Walked out in stable condition

## 2022-04-20 NOTE — PROGRESS NOTES
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  He says that he is overall doing and feeling very well  He says that he is managing the colostomy very well and in general, feels good  He flew to  [place] for a very short trip for a , but talked about how great it was to be with his extended family and how happy they were to see him  He shared with me that his son is about to get his 's license, and they have purchased him a car  He also talked about both of his children's plans for after high school and into adulthood, and how proud of both of them he is  We spent time talking about the process and the journey of having cancer, but he in general feels that they are all doing well individually and as a family  He says that being able to participate in normal everyday life tasks and events has been great for him and his children as well in managing their anxiety  He denies any other needs at this point but thanked me for stopping in to check on him  PALLAVI will remain available as needed moving forward, no other concerns at this time

## 2022-04-22 ENCOUNTER — HOSPITAL ENCOUNTER (OUTPATIENT)
Dept: INFUSION CENTER | Facility: CLINIC | Age: 57
Discharge: HOME | End: 2022-04-22

## 2022-04-22 DIAGNOSIS — C18.4 MALIGNANT NEOPLASM OF TRANSVERSE COLON (HCC): Primary | ICD-10-CM

## 2022-04-22 NOTE — PROGRESS NOTES
Pt presented for CADD D/C offering no complaints  CADD was at [facility] on d/c, port was flushed per protocol and pt was given AVS and discharged in stable condition

## 2022-04-25 ENCOUNTER — PATIENT OUTREACH (OUTPATIENT)
Dept: CASE MANAGEMENT | Facility: HOSPITAL | Age: 57
End: 2022-04-25

## 2022-04-25 RX ORDER — DEXTROSE MONOHYDRATE 50 MG/ML
20 INJECTION, SOLUTION INTRAVENOUS ONCE
Status: CANCELLED | OUTPATIENT
Start: 2022-05-04

## 2022-04-25 RX ORDER — FLUOROURACIL 50 MG/ML
400 INJECTION, SOLUTION INTRAVENOUS ONCE
Status: CANCELLED | OUTPATIENT
Start: 2022-05-04

## 2022-04-25 RX ORDER — SODIUM CHLORIDE 9 MG/ML
20 INJECTION, SOLUTION INTRAVENOUS ONCE
Status: CANCELLED | OUTPATIENT
Start: 2022-05-04

## 2022-04-25 NOTE — PROGRESS NOTES
Pt r/o to MSW by phone this morning, he would like some resources for counseling for his children and for the family as a whole  MSW emailed him with his permission, a list of several local counseling groups as well as the psychologytoday website to browse as well  I also suggested if they haven't already, to reach out to the school's guidance counselors to let them know what's going on and see if they have any therapists that they recommend as well  MSW will f/u in the near future to see if they have found a good match, no other needs at this time

## 2022-04-26 DIAGNOSIS — C18.4 MALIGNANT NEOPLASM OF TRANSVERSE COLON (HCC): Primary | ICD-10-CM

## 2022-04-28 ENCOUNTER — CONSULT (OUTPATIENT)
Dept: PALLIATIVE MEDICINE | Facility: CLINIC | Age: 57
End: 2022-04-28
Payer: COMMERCIAL

## 2022-04-28 VITALS
SYSTOLIC BLOOD PRESSURE: 148 MMHG | OXYGEN SATURATION: 98 % | RESPIRATION RATE: 20 BRPM | WEIGHT: 232.6 LBS | HEART RATE: 90 BPM | DIASTOLIC BLOOD PRESSURE: 80 MMHG | BODY MASS INDEX: 35.38 KG/M2 | TEMPERATURE: 98.1 F

## 2022-04-28 DIAGNOSIS — C18.9 COLON CANCER METASTASIZED TO LIVER (HCC): Primary | ICD-10-CM

## 2022-04-28 DIAGNOSIS — N18.31 STAGE 3A CHRONIC KIDNEY DISEASE (HCC): ICD-10-CM

## 2022-04-28 DIAGNOSIS — C78.7 COLON CANCER METASTASIZED TO LIVER (HCC): Primary | ICD-10-CM

## 2022-04-28 DIAGNOSIS — C18.4 MALIGNANT NEOPLASM OF TRANSVERSE COLON (HCC): ICD-10-CM

## 2022-04-28 DIAGNOSIS — D50.0 IRON DEFICIENCY ANEMIA DUE TO CHRONIC BLOOD LOSS: ICD-10-CM

## 2022-04-28 PROCEDURE — 99204 OFFICE O/P NEW MOD 45 MIN: CPT | Performed by: STUDENT IN AN ORGANIZED HEALTH CARE EDUCATION/TRAINING PROGRAM

## 2022-04-28 NOTE — PROGRESS NOTES
Outpatient Consultation - Palliative and Supportive Care   Leonard Gain 61 y o  male [id number]    Assessment & Plan  Problem List Items Addressed This Visit        Digestive    Malignant neoplasm of transverse colon [facility])    Colon cancer metastasized to liver [facility]) - Primary       Genitourinary    Stage 3a chronic kidney disease ([facility] [address] )       Other    Iron deficiency anemia, unspecified        #symptom management  #cancer-related pain   - minimal reported pain at this time, manages with OTC medicine, previous Rx for oxy-IR 5 mg x 30 tabs written on 02/25/2022    #nausea   - no current nausea reported   - continue ondansetron 4 mg PO Q6H PRN   - continue metoclopramide 10 mg PO BID   - primarily used for pro-motility 2/2 gastroparesis    #goals of care   - introduced Palliative and Supportive Care   - no prior AD/LW completed, introduced 5-Wishes, patient did not want a copy at this time, states may consider in future appointments   - differentiated palliative medicine from hospice medicine, patient initially felt that palliative care was terminal management  Clarified that palliative is symptoms management with treatment focused care - patient appreciated clarification   - treatment focused care with no limitations at this time   - overall stated goal is to reverse colostomy + return to work as an actor, 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 to [employer]   - upcoming CT imaging on 06/07/2022; plan to follow up with surgical oncology shortly thereafter   - per patient, no current palliative care needs at this time, appreciated introduction, will follow up on PRN basis    #psychosocial support   - emotional support provided   - Yoselin Marino,  20 years] [phone]   - two children   - Kolton Yuen, aged 13]   - Bk Torrez, aged 12]   - 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, 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 [address]  background      Next [employer] follow up on PRN basis        Controlled Substance Review    [place] or [place]  reviewed: No red flags were identified; safe to proceed with prescription  [place] PDMP Review       Value Time User    PDMP Reviewed  Yes 4/28/2022 11:38 AM Arabella Noonan MD          Medications adjusted this encounter:  Requested Prescriptions      No prescriptions requested or ordered in this encounter     No orders of the defined types were placed in this encounter  There are no discontinued medications  PPS: 100%      Joby Delgadillo was seen today for symptoms and planning cares related to above illnesses  Above orders were sent electronically, or provided in hardcopy in clinic  I have reviewed the patient's controlled substance dispensing history in the Prescription Drug Monitoring Program in compliance with the [facility] regulations before prescribing any controlled substances  We appreciate the referral, and wish for him to continue to follow with us  If there are questions or concerns, please contact us through our clinic/answering service 24 hours a day, seven days a week  Arabella Noonan MD  [facility] Palliative and Supportive [facility]  [phone]        Visit Information    Accompanied By: No one    Source of History: Self, Medical record    History Limitations: None      History of Present Illness    Joby Delgadillo is a 61 y o  male who presents as a referral from Dr Fransico Deluna of Medical Oncology for primary palliative diagnosis of 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  Consultation is requested for: symptom management, goal of care assessment and decisional support, advance care planning, emotional support in the setting of serious illness  Currently reports overall tolerating antineoplastic therapy well, minimal symptoms of distress reported  Reports fatigue that lasted 1-2 days post most recent [3rd] infusion, since resolved   Notes some cold sensitivity of hands/feet that lasted 3-4 days post chemo regimen infusion; also with esophageal sensitivity with cold drinks, which has since resolved  Adequate appetite on "off" weeks, with some decreased appetite during infusion  Overall reports 60 lb weight loss since diagnosis 2 months ago  Minimal pain reported, some post-prandial pain at stoma site  Feels that stoma "swells up" after prolonged standing and reduces upon sitting  Adequate ostomy output, actively manages diet to optimize output        Oncology History   Malignant neoplasm of transverse colon ([facility] )   3/1/2022 Initial Diagnosis    Malignant neoplasm of transverse colon ([facility] )     3/23/2022 -  Chemotherapy    fluorouracil (ADRUCIL), 400 mg/m2 = 905 mg, Intravenous, Once, 3 of 6 cycles  Administration: 905 mg (3/23/2022), 875 mg (4/6/2022), 875 mg (4/20/2022)  leucovorin calcium IVPB, 904 mg, Intravenous, Once, 3 of 6 cycles  Administration: 900 mg (3/23/2022), 876 mg (4/6/2022), 876 mg (4/20/2022)  oxaliplatin (ELOXATIN) chemo infusion, 85 mg/m2 = 192 1 mg, Intravenous, Once, 3 of 6 cycles  Administration: 192 1 mg (3/23/2022), 186 15 mg (4/6/2022), 186 15 mg (4/20/2022)  fluorouracil (ADRUCIL) ambulatory infusion Soln, 1,200 mg/m2/day = 5,425 mg, Intravenous, Over 46 hours, 3 of 6 cycles  bevacizumab-awwb (MVASI) IVPB, 5 mg/kg = 545 mg (100 % of original dose 5 mg/kg), Intravenous, Once, 3 of 6 cycles  Dose modification: 5 mg/kg (original dose 5 mg/kg, Cycle 1)  Administration: 545 mg (3/23/2022), 500 mg (4/6/2022), 500 mg (4/20/2022)         Pertinent Palliative Care Domains    Physical Symptoms: ambulates    Psychological Symptoms: mild anxiety, good insight, coping well    Social Aspects: support system spouse + two adolescent children      Advance Directive and Living Will:   Not on File, not previously completed  Power of :   n/a  POLST:   n/a    Historical Data  Past Medical History:   Diagnosis Date    Abdominal pain 3/12/2022    Acute renal failure ([facility] )     [id number] resolved    Diabetes mellitus ([facility] )     Enteritis 08/23/2016    Gastroparesis due to DM ([facility] [address] ) 08/23/2016    GERD (gastroesophageal reflux disease)     Hernia, ventral 08/04/2016    Hyperlipidemia     Hypertension     Postoperative visit 3/2/2022     Past Surgical History:   Procedure Laterality Date    COLOSTOMY N/A 2/20/2022    Procedure: COLOSTOMY LOOP, diverting;  Surgeon: Chinyere Buitrago MD;  Location: [facility];  Service: General    ESOPHAGOGASTRODUODENOSCOPY N/A 8/24/2016    Procedure: ESOPHAGOGASTRODUODENOSCOPY (EGD); Surgeon: Eh Underwood MD;  [facility];   Service:     IR PORT PLACEMENT  3/10/2022    KIDNEY STONE SURGERY      LIVER BIOPSY LAPAROSCOPIC N/A 2/20/2022    Procedure: DIAGNOSTIC LAPAROSCOPIC LIVER BIOPSY, DIVERTING LOOP COLOSTOMY ;  Surgeon: Chinyere Buitrago MD;  [facility]: [facility];  Service: General    TONSILLECTOMY      UMBILICAL HERNIA REPAIR      UMBILICAL HERNIA REPAIR LAPAROSCOPIC N/A 4/26/2019    Procedure: LAPAROSCOPIC UMBILICAL HERNIA REPAIR;  Surgeon: Chinyere Buitrago MD;  [facility];  Service: General     Social History     Socioeconomic History    Marital status: /Civil Union     Spouse name: Not on file    Number of children: Not on file    Years of education: Not on file    Highest education level: Not on file   Occupational History    Occupation: ACTOR     Employer: [employer]   Tobacco Use    Smoking status: Never Smoker    Smokeless tobacco: Never Used   Vaping Use    Vaping Use: Never used   Substance and Sexual Activity    Alcohol use: Not Currently     Comment: occasion    Drug use: No    Sexual activity: Yes     Partners: Female   Other Topics Concern    Not on file   Social History Narrative    Not on file     Social Determinants of Health     Financial Resource Strain: Not on file   Food Insecurity: No Food Insecurity    Worried About Running Out of Food in the Last Year: Never true    Bib[place] of Food in the Last Year: Never true   Transportation Needs: No Transportation Needs    Lack of Transportation (Medical): No    Lack of Transportation (Non-Medical): No   Physical Activity: Insufficiently Active    Days of Exercise per Week: 5 days    Minutes of Exercise per Session: 10 min   Stress: No Stress Concern Present    Feeling of Stress : Not at all   Social Connections: Not on file   Intimate Partner Violence: Not on file   Housing Stability: Low Risk     Unable to Pay for Housing in the Last Year: No    Number of Places Lived in the Last Year: 1    Unstable Housing in the Last Year: No     Family History   Problem Relation Age of Onset    Diabetes Mother         mellitus    Lung cancer Mother     Coronary artery disease Father      Allergies   Allergen Reactions    Shellfish-Derived Products - Food Allergy Anaphylaxis    Erythromycin GI Intolerance     Pt denies     Current Outpatient Medications   Medication Sig Dispense Refill    amLODIPine (NORVASC) 5 mg tablet TAKE 1 TABLET BY MOUTH TWICE A  tablet 0    aspirin 81 MG tablet Take 81 mg by mouth daily        atorvastatin (LIPITOR) 40 mg tablet TAKE 1 TABLET BY MOUTH EVERY DAY 90 tablet 3    B-D UF III MINI PEN NEEDLES 31G X 5 MM MISC BY DOES NOT APPLY ROUTE 4 (FOUR) TIMES A  each 3    Cholecalciferol (VITAMIN D3 PO) Take 400 Units by mouth daily      Continuous Blood Gluc Sensor (Yours FlorallyStyle Parrish 14 Day Sensor) MISC Use 1 each every 14 (fourteen) days 2 each 6    [START ON 5/4/2022] fluorouracil 5,210 mg in CADD infusion pump Infuse 5,210 mg (1,200 mg/m2/day x 2 17 m2) into a venous catheter over 46 hours for 2 days  Do not start before May 4, 2022  1 each 0    glyBURIDE-metFORMIN (GLUCOVANCE) 5-500 MG per tablet Take 1 tablet by mouth daily with breakfast Taking 1 tablet in the morning       insulin glargine (Lantus) 100 units/mL subcutaneous injection Inject 10 Units under the skin daily at bedtime (Patient taking differently: Inject 20 Units under the skin daily at bedtime Pt increased his insulin to 20 units ) 10 mL 0    Januvia 100 MG tablet TAKE 1 TABLET BY MOUTH EVERY DAY 90 tablet 3    Lantus SoloStar 100 units/mL injection pen INJECT 30 UNITS UNDER THE SKIN DAILY 15 mL 1    lisinopril (ZESTRIL) 40 mg tablet TAKE 1 TABLET BY MOUTH EVERY DAY 90 tablet 3    metoclopramide (REGLAN) 10 mg tablet Take 1 tablet (10 mg total) by mouth 2 (two) times a day 180 tablet 0    Multiple Vitamins-Minerals (multivitamin with minerals) tablet Take 1 tablet by mouth daily      Needle, Disp, (HYPODERMIC NEEDLE) 23G X 1-1/2" MISC by Does not apply route once a week 10 each 6    ondansetron (ZOFRAN) 4 mg tablet Take 4 mg by mouth every 6 (six) hours as needed      Ostomy Supplies MISC Use in the morning 100 each 3    pantoprazole (PROTONIX) 40 mg tablet TAKE 1 TABLET BY MOUTH TWICE A  tablet 3     No current facility-administered medications for this visit  Review of Systems   Constitutional: Positive for malaise/fatigue and weight loss  Negative for chills, decreased appetite and fever  HENT: Negative for congestion  Eyes: Negative for visual disturbance  Cardiovascular: Negative for chest pain  Respiratory: Negative for shortness of breath  Endocrine: Positive for cold intolerance  Musculoskeletal: Negative for falls and neck pain  Gastrointestinal: Negative for abdominal pain, constipation, nausea and vomiting  Genitourinary: Negative for frequency  Neurological: Negative for headaches  Psychiatric/Behavioral: The patient does not have insomnia  All other systems reviewed and are negative  Vital Signs    /80 (BP Location: Left arm, Cuff Size: Standard)   Pulse 90   Temp 98 1 °F (36 7 °C) (Temporal)   Resp 20   Wt 106 kg (232 lb 9 6 oz)   SpO2 98%   BMI 35 38 kg/m²     Physical Exam and Objective Data  Physical Exam  Vitals and nursing note reviewed  Constitutional:       General: He is awake        Appearance: He is not diaphoretic  Comments: Sitting up comfortably in NAD  Non-toxic appearing   HENT:      Head: Normocephalic and atraumatic  Right Ear: External ear normal       Left Ear: External ear normal    Eyes:      Comments: No gaze preference   Cardiovascular:      Rate and Rhythm: Normal rate  Pulmonary:      Effort: No tachypnea, accessory muscle usage or respiratory distress  Comments: Completes full sentences without difficulty  Musculoskeletal:      Cervical back: Normal range of motion  Neurological:      General: No focal deficit present  Mental Status: He is alert and oriented to person, place, and time     Psychiatric:         Attention and Perception: Attention normal          Mood and Affect: Mood and affect normal          Speech: Speech normal          Cognition and Memory: Cognition and memory normal            Radiology and Laboratory:  I personally reviewed and interpreted the following results:    Most Recent COVID-19 Results:  Lab Results   Component Value Date/Time    SARSCOV2 Negative 03/12/2022 04:46 AM       Most Recent Lab Work:  Lab Results   Component Value Date/Time    SODIUM 137 04/15/2022 02:22 PM    K 4 2 04/15/2022 02:22 PM    BUN 20 04/15/2022 02:22 PM    CREATININE 1 18 04/15/2022 02:22 PM    CREATININE 1 15 03/14/2022 04:43 AM    CREATININE 1 18 05/02/2017 09:19 AM    GLUC 277 (H) 04/15/2022 02:22 PM     Lab Results   Component Value Date/Time    AST 40 04/15/2022 02:22 PM    ALT 22 04/15/2022 02:22 PM    ALB 3 4 (L) 04/15/2022 02:22 PM    ALB 1 5 (L) 03/13/2022 05:03 AM    ALB 4 1 04/10/2017 08:15 AM     Lab Results   Component Value Date/Time    HGB 10 1 (L) 04/15/2022 02:22 PM    HGB 7 4 (L) 03/14/2022 12:09 PM    HGB 13 6 07/21/2017 10:03 AM    WBC 6 8 04/15/2022 02:22 PM    WBC 13 89 (H) 03/14/2022 04:43 AM    WBC 10 8 07/21/2017 10:03 AM     04/15/2022 02:22 PM     (H) 03/14/2022 04:43 AM     07/21/2017 10:03 AM    INR 1 18 02/23/2022 03:13 PM PTT 39 (H) 02/23/2022 03:13 PM       Most Recent Imaging [last 30 days]:  No results found  45 minutes was spent face to face with Godwin Bauer with greater than 50% of the time spent in counseling or coordination of care including discussions of provided medical updates, discussed palliative care, determined goals of care, determined social/family support, discussed plans of care, discussed symptom management, provided psychosocial support  Intro to palliative and differentiation of model of care from hospice  Brief intro to advanced directives + 5-Wishes  PDMP Reviewed   All of the patient's questions were answered during this discussion

## 2022-04-30 LAB
ALBUMIN SERPL-MCNC: 3.7 G/DL (ref 3.8–4.9)
ALBUMIN/GLOB SERPL: 1.5 {RATIO} (ref 1.2–2.2)
ALP SERPL-CCNC: 254 IU/L (ref 44–121)
ALT SERPL-CCNC: 19 IU/L (ref 0–44)
APPEARANCE UR: CLEAR
AST SERPL-CCNC: 31 IU/L (ref 0–40)
BACTERIA URNS QL MICRO: ABNORMAL
BASOPHILS # BLD AUTO: 0.1 X10E3/UL (ref 0–0.2)
BASOPHILS NFR BLD AUTO: 1 %
BILIRUB SERPL-MCNC: 0.4 MG/DL (ref 0–1.2)
BILIRUB UR QL STRIP: NEGATIVE
BUN SERPL-MCNC: 15 MG/DL (ref 6–24)
BUN/CREAT SERPL: 15 (ref 9–20)
CALCIUM SERPL-MCNC: 8.8 MG/DL (ref 8.7–10.2)
CASTS URNS QL MICRO: ABNORMAL /LPF
CHLORIDE SERPL-SCNC: 102 MMOL/L (ref 96–106)
CO2 SERPL-SCNC: 22 MMOL/L (ref 20–29)
COLOR UR: YELLOW
CREAT SERPL-MCNC: 0.97 MG/DL (ref 0.76–1.27)
CRYSTALS URNS MICRO: ABNORMAL
EGFR: 91 ML/MIN/1.73
EOSINOPHIL # BLD AUTO: 0 X10E3/UL (ref 0–0.4)
EOSINOPHIL NFR BLD AUTO: 0 %
EPI CELLS #/AREA URNS HPF: ABNORMAL /HPF (ref 0–10)
ERYTHROCYTE [DISTWIDTH] IN BLOOD BY AUTOMATED COUNT: 24.9 % (ref 11.6–15.4)
GLOBULIN SER-MCNC: 2.5 G/DL (ref 1.5–4.5)
GLUCOSE SERPL-MCNC: 193 MG/DL (ref 65–99)
GLUCOSE UR QL: ABNORMAL
HCT VFR BLD AUTO: 34.8 % (ref 37.5–51)
HGB BLD-MCNC: 11.3 G/DL (ref 13–17.7)
HGB UR QL STRIP: NEGATIVE
IMM GRANULOCYTES # BLD: 0 X10E3/UL (ref 0–0.1)
IMM GRANULOCYTES NFR BLD: 1 %
KETONES UR QL STRIP: NEGATIVE
LEUKOCYTE ESTERASE UR QL STRIP: NEGATIVE
LYMPHOCYTES # BLD AUTO: 1.5 X10E3/UL (ref 0.7–3.1)
LYMPHOCYTES NFR BLD AUTO: 26 %
MCH RBC QN AUTO: 27 PG (ref 26.6–33)
MCHC RBC AUTO-ENTMCNC: 32.5 G/DL (ref 31.5–35.7)
MCV RBC AUTO: 83 FL (ref 79–97)
MICRO URNS: ABNORMAL
MONOCYTES # BLD AUTO: 0.7 X10E3/UL (ref 0.1–0.9)
MONOCYTES NFR BLD AUTO: 11 %
MORPHOLOGY BLD-IMP: ABNORMAL
NEUTROPHILS # BLD AUTO: 3.5 X10E3/UL (ref 1.4–7)
NEUTROPHILS NFR BLD AUTO: 61 %
NITRITE UR QL STRIP: NEGATIVE
PH UR STRIP: 5.5 [PH] (ref 5–7.5)
PLATELET # BLD AUTO: 305 X10E3/UL (ref 150–450)
POTASSIUM SERPL-SCNC: 3.6 MMOL/L (ref 3.5–5.2)
PROT SERPL-MCNC: 6.2 G/DL (ref 6–8.5)
PROT UR QL STRIP: ABNORMAL
RBC # BLD AUTO: 4.19 X10E6/UL (ref 4.14–5.8)
RBC #/AREA URNS HPF: ABNORMAL /HPF (ref 0–2)
SODIUM SERPL-SCNC: 137 MMOL/L (ref 134–144)
SP GR UR: 1.02 (ref 1–1.03)
UNIDENT CRYS URNS QL MICRO: PRESENT
UROBILINOGEN UR STRIP-ACNC: 0.2 MG/DL (ref 0.2–1)
WBC # BLD AUTO: 5.9 X10E3/UL (ref 3.4–10.8)
WBC #/AREA URNS HPF: ABNORMAL /HPF (ref 0–5)

## 2022-05-04 ENCOUNTER — HOSPITAL ENCOUNTER (OUTPATIENT)
Dept: INFUSION CENTER | Facility: CLINIC | Age: 57
Discharge: HOME | End: 2022-05-04
Payer: COMMERCIAL

## 2022-05-04 ENCOUNTER — NUTRITION (OUTPATIENT)
Dept: NUTRITION | Facility: CLINIC | Age: 57
End: 2022-05-04

## 2022-05-04 VITALS
HEART RATE: 84 BPM | RESPIRATION RATE: 18 BRPM | HEIGHT: 68 IN | DIASTOLIC BLOOD PRESSURE: 90 MMHG | TEMPERATURE: 97.9 F | WEIGHT: 223.33 LBS | SYSTOLIC BLOOD PRESSURE: 132 MMHG | BODY MASS INDEX: 33.85 KG/M2

## 2022-05-04 DIAGNOSIS — Z71.3 NUTRITIONAL COUNSELING: Primary | ICD-10-CM

## 2022-05-04 DIAGNOSIS — C18.4 MALIGNANT NEOPLASM OF TRANSVERSE COLON (HCC): Primary | ICD-10-CM

## 2022-05-04 PROCEDURE — 96415 CHEMO IV INFUSION ADDL HR: CPT

## 2022-05-04 PROCEDURE — 96413 CHEMO IV INFUSION 1 HR: CPT

## 2022-05-04 PROCEDURE — 96411 CHEMO IV PUSH ADDL DRUG: CPT

## 2022-05-04 PROCEDURE — 96417 CHEMO IV INFUS EACH ADDL SEQ: CPT

## 2022-05-04 PROCEDURE — 96368 THER/DIAG CONCURRENT INF: CPT

## 2022-05-04 PROCEDURE — 96367 TX/PROPH/DG ADDL SEQ IV INF: CPT

## 2022-05-04 PROCEDURE — G0498 CHEMO EXTEND IV INFUS W/PUMP: HCPCS

## 2022-05-04 RX ORDER — FLUOROURACIL 50 MG/ML
400 INJECTION, SOLUTION INTRAVENOUS ONCE
Status: COMPLETED | OUTPATIENT
Start: 2022-05-04 | End: 2022-05-04

## 2022-05-04 RX ORDER — DEXTROSE MONOHYDRATE 50 MG/ML
20 INJECTION, SOLUTION INTRAVENOUS ONCE
Status: COMPLETED | OUTPATIENT
Start: 2022-05-04 | End: 2022-05-04

## 2022-05-04 RX ORDER — SODIUM CHLORIDE 9 MG/ML
20 INJECTION, SOLUTION INTRAVENOUS ONCE
Status: COMPLETED | OUTPATIENT
Start: 2022-05-04 | End: 2022-05-04

## 2022-05-04 RX ADMIN — FLUOROURACIL 870 MG: 50 INJECTION, SOLUTION INTRAVENOUS at 13:27

## 2022-05-04 RX ADMIN — SODIUM CHLORIDE 20 ML/HR: 0.9 INJECTION, SOLUTION INTRAVENOUS at 09:50

## 2022-05-04 RX ADMIN — DEXTROSE 20 ML/HR: 5 SOLUTION INTRAVENOUS at 11:17

## 2022-05-04 RX ADMIN — BEVACIZUMAB-AWWB 500 MG: 400 INJECTION, SOLUTION INTRAVENOUS at 10:23

## 2022-05-04 RX ADMIN — DEXAMETHASONE SODIUM PHOSPHATE: 10 INJECTION, SOLUTION INTRAMUSCULAR; INTRAVENOUS at 09:53

## 2022-05-04 RX ADMIN — LEUCOVORIN CALCIUM 868 MG: 350 INJECTION, POWDER, LYOPHILIZED, FOR SOLUTION INTRAMUSCULAR; INTRAVENOUS at 11:23

## 2022-05-04 RX ADMIN — OXALIPLATIN 184.45 MG: 5 INJECTION, SOLUTION INTRAVENOUS at 11:23

## 2022-05-04 NOTE — PLAN OF CARE
Prevnar 13 given to Right deltoid without complication.    Josefina Walters CMA ([facility])       Problem: Potential for Falls  Goal: Patient will remain free of falls  Description: INTERVENTIONS:  - Educate patient/family on patient safety including physical limitations  - Instruct patient to call for assistance with activity   - Keep Call bell within reach  - Keep care items and personal belongings within reach  Outcome: Progressing

## 2022-05-04 NOTE — PROGRESS NOTES
Outpatient Oncology Nutrition Consultation   Type of Consult: Follow Up  Care Location: Infusion Center    Reason for referral: From Infusion RN on 3/23/22 (reason for referral: Malnutrition Screening Tool (MST) Triggers: scored a 4 indicating >/=34# (>/=15 4 kg) recent wt loss and is eating poorly due to a decreased appetite  Date of MST: 3/23/22 )    Nutrition Assessment:   Oncology Diagnosis & Treatments: Diagnosed with colon cancer 3/1/22  · S/p colostomy 2/20/22  · Began chemotherapy (adrucil, oxaliplatin, mvasi) 3/23/22      Oncology History   Malignant neoplasm of transverse colon ([facility] )   3/1/2022 Initial Diagnosis    Malignant neoplasm of transverse colon ([facility] [address] )     3/23/2022 -  Chemotherapy    fluorouracil (ADRUCIL), 400 mg/m2 = 905 mg, Intravenous, Once, 4 of 6 cycles  Administration: 905 mg (3/23/2022), 875 mg (4/6/2022), 875 mg (4/20/2022)  leucovorin calcium IVPB, 904 mg, Intravenous, Once, 4 of 6 cycles  Administration: 900 mg (3/23/2022), 876 mg (4/6/2022), 876 mg (4/20/2022)  oxaliplatin (ELOXATIN) chemo infusion, 85 mg/m2 = 192 1 mg, Intravenous, Once, 4 of 6 cycles  Administration: 192 1 mg (3/23/2022), 186 15 mg (4/6/2022), 186 15 mg (4/20/2022)  fluorouracil (ADRUCIL) ambulatory infusion Soln, 1,200 mg/m2/day = 5,425 mg, Intravenous, Over 46 hours, 4 of 6 cycles  bevacizumab-awwb (MVASI) IVPB, 5 mg/kg = 545 mg (100 % of original dose 5 mg/kg), Intravenous, Once, 4 of 6 cycles  Dose modification: 5 mg/kg (original dose 5 mg/kg, Cycle 1)  Administration: 545 mg (3/23/2022), 500 mg (4/6/2022), 500 mg (4/20/2022)       Past Medical & Surgical Hx: T2DM, gastroparesis, HLD, CKD, GERD  Patient Active Problem List   Diagnosis    Type 2 diabetes mellitus without complication, without long-term current use of insulin (HCC)    Benign essential hypertension    Mixed hyperlipidemia    Erectile dysfunction    Low testosterone    Testicular hypogonadism    Morbid obesity ([address] )    Incarcerated umbilical hernia    Left ureteral calculus    Type 2 diabetes mellitus with hyperlipidemia (HCC)    Colonic mass    Microcytic anemia    Hypokalemia    Transaminitis    Stage 3a chronic kidney disease (HCC)    Thrombocytosis    Iron deficiency anemia, unspecified    Malignant neoplasm of transverse colon (HCC)    Cancer [facility] transverse colon ([facility] )    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)    Colon cancer metastasized to liver ([facility] )    SIRS (systemic inflammatory response syndrome) ([place] [address] )     Past Medical History:   Diagnosis Date    Abdominal pain 3/12/2022    Acute renal failure ([place] [address] )     [id number] resolved    Diabetes mellitus ([place] [address] )     Enteritis 08/23/2016    Gastroparesis due to DM ([place] [address] ) 08/23/2016    GERD (gastroesophageal reflux disease)     Hernia, ventral 08/04/2016    Hyperlipidemia     Hypertension     Postoperative visit 3/2/2022     Past Surgical History:   Procedure Laterality Date    COLOSTOMY N/A 2/20/2022    Procedure: COLOSTOMY LOOP, diverting;  Surgeon: Beth Leong MD;  Location: [facility];  Service: General    ESOPHAGOGASTRODUODENOSCOPY N/A 8/24/2016    Procedure: ESOPHAGOGASTRODUODENOSCOPY (EGD); Surgeon: Jamie Joaquin MD;  [facility];   Service:     IR PORT PLACEMENT  3/10/2022    KIDNEY STONE SURGERY      LIVER BIOPSY LAPAROSCOPIC N/A 2/20/2022    Procedure: DIAGNOSTIC LAPAROSCOPIC LIVER BIOPSY, DIVERTING LOOP COLOSTOMY ;  Surgeon: Beth Leong MD;  Location: [facility];  Service: General    TONSILLECTOMY      UMBILICAL HERNIA REPAIR      UMBILICAL HERNIA REPAIR LAPAROSCOPIC N/A 4/26/2019    Procedure: LAPAROSCOPIC UMBILICAL HERNIA REPAIR;  Surgeon: Beth Leong MD;  Location: [facility];  Service: General       Review of Medications:   Vitamins, Supplements and Herbals: Med List Reviewed & pt is only taking: vitamin D 400IU    Current Outpatient Medications:     amLODIPine (NORVASC) 5 mg tablet, TAKE 1 TABLET BY MOUTH TWICE A DAY, Disp: 180 tablet, Rfl: 0    aspirin 81 MG tablet, Take 81 mg by mouth daily  , Disp: , Rfl:     atorvastatin (LIPITOR) 40 mg tablet, TAKE 1 TABLET BY MOUTH EVERY DAY, Disp: 90 tablet, Rfl: 3    B-D UF III MINI PEN NEEDLES 31G X 5 MM MISC, BY DOES NOT APPLY ROUTE 4 (FOUR) TIMES A DAY, Disp: 100 each, Rfl: 3    Cholecalciferol (VITAMIN D3 PO), Take 400 Units by mouth daily, Disp: , Rfl:     Continuous Blood Gluc Sensor (FreeStyle [place] 14 Day Sensor) MISC, Use 1 each every 14 (fourteen) days, Disp: 2 each, Rfl: 6    fluorouracil 5,210 mg in CADD infusion pump, Infuse 5,210 mg (1,200 mg/m2/day x 2 17 m2) into a venous catheter over 46 hours for 2 days  Do not start before May 4, 2022 , Disp: 1 each, Rfl: 0    glyBURIDE-metFORMIN (GLUCOVANCE) 5-500 MG per tablet, Take 1 tablet by mouth daily with breakfast Taking 1 tablet in the morning , Disp: , Rfl:     insulin glargine (Lantus) 100 units/mL subcutaneous injection, Inject 10 Units under the skin daily at bedtime (Patient taking differently: Inject 20 Units under the skin daily at bedtime Pt increased his insulin to 20 units ), Disp: 10 mL, Rfl: 0    Januvia 100 MG tablet, TAKE 1 TABLET BY MOUTH EVERY DAY, Disp: 90 tablet, Rfl: 3    Lantus SoloStar 100 units/mL injection pen, INJECT 30 UNITS UNDER THE SKIN DAILY, Disp: 15 mL, Rfl: 1    lisinopril (ZESTRIL) 40 mg tablet, TAKE 1 TABLET BY MOUTH EVERY DAY, Disp: 90 tablet, Rfl: 3    metoclopramide (REGLAN) 10 mg tablet, Take 1 tablet (10 mg total) by mouth 2 (two) times a day, Disp: 180 tablet, Rfl: 0    Multiple Vitamins-Minerals (multivitamin with minerals) tablet, Take 1 tablet by mouth daily, Disp: , Rfl:     Needle, Disp, (HYPODERMIC NEEDLE) 23G X 1-1/2" MISC, by Does not apply route once a week, Disp: 10 each, Rfl: 6    ondansetron (ZOFRAN) 4 mg tablet, Take 4 mg by mouth every 6 (six) hours as needed, Disp: , Rfl:     Ostomy Supplies MISC, Use in the morning, Disp: 100 each, Rfl: 3    pantoprazole (PROTONIX) 40 mg tablet, TAKE 1 TABLET BY MOUTH TWICE A DAY, Disp: 180 tablet, Rfl: 3  No current facility-administered medications for this visit      Facility-Administered Medications Ordered in Other Visits:     fluorouracil (ADRUCIL) injection 870 mg, 400 mg/m2 (Treatment Plan Recorded), Intravenous, Once, Ami Estrada MD    leucovorin 868 mg in dextrose 5 % 250 mL IVPB, 400 mg/m2 (Treatment Plan Recorded), Intravenous, Once, Ami Estrada MD, Last Rate: 146 7 mL/hr at 05/04/22 1123, 868 mg at 05/04/22 1123    oxaliplatin (ELOXATIN) 184 45 mg in dextrose 5 % 250 mL chemo infusion, 85 mg/m2 (Treatment Plan Recorded), Intravenous, Once, Ami Estrada MD, Last Rate: 143 4 mL/hr at 05/04/22 1123, 184 45 mg at 05/04/22 1123    Most Recent Lab Results: Checks BG at home: 90-100mg/dL in the AM, ~200mg/dL in the evening   Lab Results   Component Value Date    WBC 5 9 04/29/2022    NEUTROABS 3 5 04/29/2022    IRON 26 (L) 02/20/2022    TIBC 235 (L) 02/20/2022    FERRITIN 125 02/20/2022    CHOLESTEROL 125 08/26/2021    CHOL 91 (L) 04/10/2017    TRIG 75 08/26/2021    HDL 45 08/26/2021    LDLCALC 65 08/26/2021    ALT 19 04/29/2022    AST 31 04/29/2022    ALB 3 7 (L) 04/29/2022     05/02/2017     04/10/2017    SODIUM 137 04/29/2022    SODIUM 137 04/15/2022    K 3 6 04/29/2022    K 4 2 04/15/2022     04/29/2022    BUN 15 04/29/2022    BUN 20 04/15/2022    CREATININE 0 97 04/29/2022    CREATININE 1 18 04/15/2022    EGFR 91 04/29/2022    TSH 0 719 08/26/2021    GLUCOSE 118 (H) 05/02/2017    POCGLU 309 (H) 03/14/2022    GLUF 246 (H) 03/14/2022    GLUC 193 (H) 04/29/2022    HGBA1C 7 5 (H) 02/10/2022    HGBA1C 8 0 (H) 08/26/2021    HGBA1C 9 9 (H) 04/10/2021    CALCIUM 7 9 (L) 03/14/2022       Anthropometric Measurements:   Height: 68"  Ht Readings from Last 1 Encounters:   05/04/22 5' 7 99" (1 727 m)     Wt Readings from Last 20 Encounters:   05/04/22 101 kg (223 lb 5 2 oz)   04/28/22 106 kg (232 lb 9 6 oz) 04/20/22 104 kg (229 lb 3 2 oz)   04/06/22 104 kg (228 lb 6 4 oz)   04/04/22 103 kg (227 lb)   03/23/22 105 kg (231 lb 11 3 oz)   03/18/22 105 kg (231 lb 3 2 oz)   03/17/22 105 kg (232 lb)   03/14/22 110 kg (241 lb 10 oz)   03/10/22 108 kg (239 lb)   03/07/22 109 kg (239 lb 6 4 oz)   03/02/22 108 kg (239 lb)   03/01/22 109 kg (240 lb)   02/18/22 133 kg (293 lb 3 4 oz)   03/23/21 133 kg (292 lb 9 6 oz)   03/03/20 131 kg (289 lb)   03/02/20 133 kg (293 lb 6 4 oz)   02/04/20 126 kg (277 lb)   02/03/20 127 kg (280 lb)   01/04/20 127 kg (280 lb)       Weight History:    Usual Weight: 290#   Varian: n/a   Home Scale: none     Oncology Nutrition-Anthropometrics      Nutrition from 5/4/2022 in [place] [address] Oncology Dietitian [place] Nutrition from 4/20/2022 in [place] [address] Oncology Dietitian [place]   Patient age (years): 61 years 61 years   Patient (male) height (in): 76 in 76 in   Current weight (lbs): 223 3 lbs 229 2 lbs   Current weight to be used for anthropometric calculations (kg) 101 5 kg 104 2 kg   BMI: 33 9 34 8   IBW male 154 lb 154 lb   IBW (kg) male 70 kg 70 kg   IBW % (male) 145 % 148 8 %   Adjusted BW (male): 171 3 lbs 172 8 lbs   Adjusted BW in kg (male): 77 9 kg 78 5 kg   % weight change after 1 week: -4 % --   Weight change after 1 week (lbs) -9 3 lbs --   % weight change after 1 month: -1 6 % -1 1 %   Weight change after 1 month (lbs) -3 7 lbs -2 5 lbs   % weight change after 3 months: -23 8 % -18 1 %   Weight change after 3 months (lbs) -69 9 lbs -50 8 lbs          Nutrition-Focused Physical Findings: none observed    Food/Nutrition-Related History & Client/Social History:    Current Nutrition Impact Symptoms:  [] Nausea  [] Reduced Appetite  [] Acid Reflux    [] Vomiting  [x] Unintended Wt Loss -significant x1 week and x3 months  [] Malabsorption    [] Diarrhea  [] Unintended Wt Gain  [] Dumping Syndrome    [] Constipation  [] Thick Mucous/Secretions  [] Abdominal Pain    [] Dysgeusia (Altered Taste)  [] Xerostomia (Dry Mouth)  [] Gas    [] Dysosmia (Altered Smell)  [] Thrush  [] Difficulty Chewing    [] Oral Mucositis (Sore Mouth)  [] Fatigue  [x] Hyperglycemia: hba1c 7 5% on 2/10/22; BG random 277mg/dL on 4/15/22; BG fasting 246mg/dL on 3/14/22  [] Odynophagia  [] Esophagitis  [] Other:    [] Dysphagia  [] Early Satiety  [x] No Problems Eating      Food Allergies & Intolerances: no    Current Diet: Avoiding cold temperatures while on Oxaliplatin and reintroducing foods post- colostomy  Current Nutrition Intake: Unchanged from last visit  Appetite: Good  Nutrition Route: PO  Oral Care: brushes BID  Activity level: ADL, feeling more energy         25 Hr Diet Recall:   Breakfast: corn flakes, yogurt OR eggs OR toast with extra cream cheese OR french toast OR donut   Lunch: tuna sandwich   Snack: tortilla chips, humus    Dinner: ground beef burrito OR red meat, chicken, or salmon with carrots/string beans/rice    Snack: Vanilla wafers     Beverages: water (12oz x1-3), ginger ale (8oz x1)   Supplements:    Premier Protein Shake (11 oz, 160 kcal, 30 g pro) 1x daily      Oncology Nutrition-Estimated Needs      Nutrition from 2022 in [address] [place] [address] Dietitian Dwayne Nutrition from 2022 in [address] [place] [address]   Weight type used Actual weight Actual weight   Weight in kilograms (kg) used for estimated needs 101 5 kg 104 2 kg   Energy needs formula:  35-40 kcal/kg 35-40 kcal/kg   Energy needs based on 35 kcal/k kcal 3646 kcal   Energy needs based on 40 kcal/k kcal 4167 kcal   Protein needs formula: 1 5-2 g/kg 1 5-2 g/kg   Protein needs based on 1 5 g/kg 152 g 156 g   Protein needs based on 2 g/kg 203 g 208 g   Fluid needs formula: 30-35 mL/kg 30-35 mL/kg   Fluid needs based on 30 mL/kg 3042 mL 3123 mL   Fluid needs in ounces 103 oz 106 oz   Fluid needs based on 35 mL/kg 3549 mL 3644 mL   Fluid needs in ounces 120 oz 123 oz           Discussion & Intervention:   Jeremy Erwin was evaluated today for an RD follow up regarding wt loss and diet post colostomy  Jeremy Erwin is currently undergoing tx for colon cancer  Today Lit Ly explains that he continues to work on increasing the variety of foods he is eating and is tolerating this process well  He is able to eat most foods, but continues to be cautious with raw vegetables/fruits, fried foods, and spicy foods  He plans to try cooked spinach and reintroducing raw carrots or peppers  Reviewed introducing foods one at a time, chewing well, and assessing tolerance before reintroducing others  Reviewed 24 hour recall, which revealed an suboptimal po intake, and discussed ways to optimize nutrient intake  Additional discussion included: MNT for: colostomy, hyperglycemia , how to modify foods for anticipated nutrition impact symptoms pt may experience during CA tx, adequate hydration & fluid choices, eating smaller more frequent meals every 2-3 hours (5-6 small meals/day), keeping non-perishable foods nearby to snack on, continuing oral nutrition supplements and mindful eating concepts  Moving forward, [employer] to continue trials of new foods  Materials Provided: not applicable  All questions and concerns addressed during todays visit  Jeremy Erwin has RD contact information  Nutrition Diagnosis:    Inadequate Energy Intake related to physiological causes, disease state and treatment related issues as evidenced by food recall, wt loss and discussion with pt and/or family   Increased Nutrient Needs (kcal & pro) related to increased demand for nutrients and disease state as evidenced by cancer dx and pt undergoing tx for cancer   Altered GI Function related to tx for cancer as evidenced by s/p colostomy    Monitoring & Evaluation:   Goals:  · weight maintenance/stabilization  · adequate nutrition impact symptom management  · pt to meet >/=75% estimated nutrition needs daily  · adequate glycemic control    · Progress Towards Goals: Progressing    Nutrition Rx & Recommendations:  · Diet: High Calorie, High Protein (for high calorie foods see pages 52-53, and for high protein foods see pages 49-51 in your Eating Hints book)  · Keep a daily food journal (pen/paper, bossman such as [employer])  · Small, frequent meals/snacks may be easier to tolerate than 3 large daily meals  Aim for 5-6 small meals per day (every 2-3 hours)  · Include protein at all meals/snacks  · Include a variety of foods (as tolerated/allowed by diet)  · Stay hydrated by sipping fluids of choice/tolerance throughout the day  · Alter food choices and eating patterns to accommodate changing needs  · Weigh yourself regularly  If you notice weight loss, make an effort to increase your daily food/calorie intake  If you continue to notice loss after these efforts, reach out to your dietitian to establish a plan to stabilize weight  · Avoid gas-producing foods such as broccoli, cabbage, sauerkraut, [place] sprouts, cauliflower, corn, cucumbers, onions, peppers, beans, peas, lentils, apples, apple juice, avocado, and melons  Carbonated drinks, chewing gum, and drinking through a straw can also cause gas  Limiting lactose may help for those who are sensitive to milk products  · Spread carbohydrate intake throughout the day for glycemic control  · Continue Premier Protein daily  · For extra calories: add extra sauces/gravies, keep non perishable snacks nearby, choose liquids with calories, add extra cream cheese/cheese to foods as tolerated  Nutrition after colostomy placement:   After 6 weeks post surgery you can start adding foods that you have been avoiding and back into your diet   Add new foods slowly, one at a time, and chew them well         Food choices and their effects:    Foods that may cause diarrhea or increased output: fresh or raw fruit, fresh or raw vegetables, fried or spicy foods, high sugar beverages, legumes, milk and dairy foods, prune juice, sugar alcohols (sorbitol, mannitol, xylitol)   Foods that may help thicken stool: applesauce, bananas, cheese, marshmallows, oatmeal, creamy peanut butter, potatoes (no skin), tapioca pudding, white bread, pretzels, saltine crackers, white rice, white pasta/noodles, yogurt   Foods that may cause gas or odor: eggs, onions, peanuts, legumes, carbonated drinks, cruciferous vegetables (Brussel's sprouts, cauliflower, broccoli, cabbage), chewing gum   Foods that may help relieve gas and odor: buttermilk, cranberry juice, parsley, yogurt with active cultures   See "Diet and Nutrition After Colostomy" handout for additional suggestions on foods to choose and foods to avoid/limit after ostomy placement       Follow Up Plan: During infusion 5/18/22  Recommend Referral to Other Providers: none at this time

## 2022-05-04 NOTE — PATIENT INSTRUCTIONS
Nutrition Rx & Recommendations:  · Diet: High Calorie, High Protein (for high calorie foods see pages 52-53, and for high protein foods see pages 49-51 in your Eating Hints book)  · Keep a daily food journal (pen/paper, bossman such as [place])  · Small, frequent meals/snacks may be easier to tolerate than 3 large daily meals  Aim for 5-6 small meals per day (every 2-3 hours)  · Include protein at all meals/snacks  · Include a variety of foods (as tolerated/allowed by diet)  · Stay hydrated by sipping fluids of choice/tolerance throughout the day  · Alter food choices and eating patterns to accommodate changing needs  · Weigh yourself regularly  If you notice weight loss, make an effort to increase your daily food/calorie intake  If you continue to notice loss after these efforts, reach out to your dietitian to establish a plan to stabilize weight  · Avoid gas-producing foods such as broccoli, cabbage, sauerkraut, [place] sprouts, cauliflower, corn, cucumbers, onions, peppers, beans, peas, lentils, apples, apple juice, avocado, and melons  Carbonated drinks, chewing gum, and drinking through a straw can also cause gas  Limiting lactose may help for those who are sensitive to milk products  · Spread carbohydrate intake throughout the day for glycemic control  · Continue Premier Protein daily  · For extra calories: add extra sauces/gravies, keep non perishable snacks nearby, choose liquids with calories, add extra cream cheese/cheese to foods as tolerated  Nutrition after colostomy placement:   After 6 weeks post surgery you can start adding foods that you have been avoiding and back into your diet   Add new foods slowly, one at a time, and chew them well         Food choices and their effects:    Foods that may cause diarrhea or increased output: fresh or raw fruit, fresh or raw vegetables, fried or spicy foods, high sugar beverages, legumes, milk and dairy foods, prune juice, sugar alcohols (sorbitol, mannitol, xylitol)   Foods that may help thicken stool: applesauce, bananas, cheese, marshmallows, oatmeal, creamy peanut butter, potatoes (no skin), tapioca pudding, white bread, pretzels, saltine crackers, white rice, white pasta/noodles, yogurt   Foods that may cause gas or odor: eggs, onions, peanuts, legumes, carbonated drinks, cruciferous vegetables (Brussel's sprouts, cauliflower, broccoli, cabbage), chewing gum   Foods that may help relieve gas and odor: buttermilk, cranberry juice, parsley, yogurt with active cultures   See "Diet and Nutrition After Colostomy" handout for additional suggestions on foods to choose and foods to avoid/limit after ostomy placement       Follow Up Plan: During infusion 5/18/22  Recommend Referral to Other Providers: none at this time

## 2022-05-05 ENCOUNTER — TELEPHONE (OUTPATIENT)
Dept: HEMATOLOGY ONCOLOGY | Facility: CLINIC | Age: 57
End: 2022-05-05

## 2022-05-05 NOTE — TELEPHONE ENCOUNTER
Spoke with patient to let him know that his appt time with Dr Davenport May on 5/6 has been moved to 11:20am  Pt aware and okay with this time change

## 2022-05-06 ENCOUNTER — TELEPHONE (OUTPATIENT)
Dept: HEMATOLOGY ONCOLOGY | Facility: CLINIC | Age: 57
End: 2022-05-06

## 2022-05-06 ENCOUNTER — OFFICE VISIT (OUTPATIENT)
Dept: HEMATOLOGY ONCOLOGY | Facility: CLINIC | Age: 57
End: 2022-05-06
Payer: COMMERCIAL

## 2022-05-06 ENCOUNTER — HOSPITAL ENCOUNTER (OUTPATIENT)
Dept: INFUSION CENTER | Facility: CLINIC | Age: 57
Discharge: HOME | End: 2022-05-06

## 2022-05-06 VITALS
TEMPERATURE: 97.6 F | DIASTOLIC BLOOD PRESSURE: 80 MMHG | HEART RATE: 91 BPM | SYSTOLIC BLOOD PRESSURE: 142 MMHG | BODY MASS INDEX: 35.92 KG/M2 | WEIGHT: 237 LBS | OXYGEN SATURATION: 97 % | RESPIRATION RATE: 18 BRPM | HEIGHT: 68 IN

## 2022-05-06 DIAGNOSIS — C18.4 CANCER OF TRANSVERSE COLON (HCC): ICD-10-CM

## 2022-05-06 DIAGNOSIS — C78.7 COLON CANCER METASTASIZED TO LIVER (HCC): Primary | ICD-10-CM

## 2022-05-06 DIAGNOSIS — C18.4 MALIGNANT NEOPLASM OF TRANSVERSE COLON (HCC): Primary | ICD-10-CM

## 2022-05-06 DIAGNOSIS — C18.4 MALIGNANT NEOPLASM OF TRANSVERSE COLON (HCC): ICD-10-CM

## 2022-05-06 DIAGNOSIS — C18.9 COLON CANCER METASTASIZED TO LIVER (HCC): Primary | ICD-10-CM

## 2022-05-06 DIAGNOSIS — D50.0 IRON DEFICIENCY ANEMIA DUE TO CHRONIC BLOOD LOSS: ICD-10-CM

## 2022-05-06 PROCEDURE — 99214 OFFICE O/P EST MOD 30 MIN: CPT | Performed by: INTERNAL MEDICINE

## 2022-05-06 RX ORDER — SODIUM CHLORIDE 9 MG/ML
20 INJECTION, SOLUTION INTRAVENOUS ONCE
Status: CANCELLED | OUTPATIENT
Start: 2022-05-13

## 2022-05-06 NOTE — PROGRESS NOTES
Pt here for CADD pump disconnect  Pt tolerated at home infusion  Res volume is 0ml  Port flushed and blood return noted and was de accessed  Band aid applied  Pt aware of future appts   Pt declined AVS

## 2022-05-06 NOTE — PROGRESS NOTES
Hematology/Oncology Progress Note    Date of Service: 5/6/2022    [facility]  [facility]  [address] [address] [facility] [address] [place]  [facility] [address] [phone]    Hem/Onc Problem List:     Metastatic colon cancer  Chief Complaint:   Routine follow-up for management of colon cancer on chemotherapy    Assessment/Plan:     1  Metastatic colon cancer with extensive liver metastasis, MMR expression intact  Status post colostomy  Palliative chemotherapy mFOLFOX6 with bevacizumab without Neulasta started on March 23, 2022  Patient has had 4 cycles chemotherapy  He did extremely well  He started gaining weight  Patient will continue current management  CT scan for restaging in June 7, 2022  Patient will have a CBC CMP and tumor marker CA 19-9, CEA on May 13, 2022       2  Macrocytic hypochromic anemia likely due to GI bleeding  I will give patient Feraheme  Check CBC and CMP  It is okay switched to Venofer if insurance prefers  3  Elevated alkaline phosphatase and transaminase due to liver metastasis  They are trending down  I will continue to treat underlying colon cancer  Follow-up:  Return to clinic in 5 weeks with labs and CT scan prior  Disclaimer: This document was prepared using [employer] technology  If a word or phrase is confusing, or does not make sense, this is likely due to recognition error which was not discovered during the providers review  If you believe an error has occurred, please Contact me through [employer] service for dorothy? cation  AJCC 8th Edition Cancer Stage :    Cancer Staging  No matching staging information was found for the patient  Hematology/Oncology History:     · February 18, 2022 Patient was admitted to hospital because of abdominal pain  CT abdomen:  IMPRESSION:     1    Distal transverse colonic apple core lesion causing a degree of obstruction with dilated upstream colon with mild surrounding fat stranding  Small foci of gas peripheral to colonic stool may represent trapped air; however, pneumatosis cannot be   entirely excluded      2  Innumerable hepatic metastases      3  Nonenlarged mesenteric lymph nodes adjacent to the colonic mass, concerning for metastatic disease, given their location  · February 19, 2022, Chest wo contrast:  6 mm right upper lobe lung nodule  No definite metastatic disease in the chest   · February 20, 2022, CT-guided biopsy of the liver lesion consistent with colorectal primary  Final Diagnosis   A  Liver, biopsy:  - Adenocarcinoma, consistent with colorectal primary      Comment: Immunohistochemistry was performed on block A1  The tumor cells are positive for CK20, SATB2, CDX2,  and negative for TTF-1, NKX3 1, Ck7; DPC-4 shows no loss, supporting the above diagnosis  Dr Chastity Mckenna is notified of the diagnosis in [employer] via [employer] on 2/24/2022  at 11:36 am    Immunohistochemistry [facility]) testing for Mismatch Repair (MMR) proteins has been requested on block A1, and the results will be issued in an addendum         · February 20, 2022  MMR expression intact   2, CA 19-9 1848  Alkaline phosphatase elevated  · March 12, 2022 CT abdomen pelvis with contrast:  Postoperative changes of recent right mid abdominal diverting colostomy  No evidence of traction  Innumerable hepatic metastasis similar to the prior exam   · March 23, 2022 cycle 1 day 1 chemotherapy with modified FOLFOX 6 and Avastin  History of Present Illiness:   Steph Bird is a 61 y o  male with the above-noted HemOnc history who is here for management of colon cancer on chemotherapy  Patient has past medical history of diabetes mellitus, hypertension and hyperlipidemia, GERD who was admitted hospital on February 18, 2022 because of abdominal pain  CT scan showed transverse colonic able core lesion measuring 3 7 cm in length with obstruction    Innumerable hepatic metastasis with largest one measuring 7 2 x 6 2 cm  Nonenlarged mesenteric lymph nodes adjacent to the colonic mass concerning for metastatic disease as well  CT chest showed noncalcified subcentimeter nodule in the right upper lobe,  which was indeterminate   2, CA 19-9 1848  Alkaline phosphatase elevated  Patient then underwent diagnostic laparoscopy with diverting loop transverse colostomy secondary to near obstructive apple core lesion of distal transverse colon and liver biopsy on February 20, 2020  Pathology consistent with metastatic colon cancer  MMR are normally expressed  Lab showed microcytic hypochromic anemia and leukocytosis  Iron studies showed iron deficiency  Patient started mFOLFOX6 with Avastin on March 23, 2022  Patient had 4 cycles treatment  Patient tolerated treatment fairly well except fatigue  Patient feels fine  Patient tolerated the treatment very well  No fever or chills  No exertional chest pain, diaphoresis or shortness  of breath  No cough and phlegm, no hemoptysis  Patient denied nausea  and vomiting  No abdominal pain  No diarrhea or constipation  No  symptoms  No headache or blurred vision  No seizure activity  Appetite good  Patient has gained 5 lb The patient denies bleeding anywhere  ROS: A 12-point of review of systems is obtained and other than the above is noncontributory  Objective:   VITALS:   /80 (BP Location: Left arm, Cuff Size: Adult)   Pulse 91   Temp 97 6 °F (36 4 °C)   Resp 18   Ht 5' 7 99" (1 727 m)   Wt 108 kg (237 lb)   SpO2 97%   BMI 36 05 kg/m²     Physical EXAM:  General:  Alert, cooperative, no distress  Head:  Normocephalic, without obvious abnormality, atraumatic  Eyes:  Conjunctivae/corneas clear  PERRL, EOMs intact  No evidence of conjunctivitis     Throat: Lips, mucosa, and tongue normal   No oral thrush  Neck: Supple, symmetrical, trachea midline, no adenopathy    Lungs:   Clear to auscultation bilaterally  Respiratory effort easy, nonlabored    Heart:  Regular rate and rhythm, S1, S2 normal, no murmur  Abdomen:   Soft, non-tender,nondistended  Bowel sounds normal  No masses,  No organomegaly  Colostomy bag in place  Extremities:   Lymph nodes: Extremities normal, no cyanosis or edema  No axillary or inguinal adenopathy   Skin: Skin color, texture normal  No rashes    Neurologic: A&Ox4  No focal neuro deficits       Allergies   Allergen Reactions    Shellfish-Derived Products - Food Allergy Anaphylaxis    Erythromycin GI Intolerance     Pt denies       Past Medical History:   Diagnosis Date    Abdominal pain 3/12/2022    Acute renal failure ([place] [address] )     [id number] resolved    Diabetes mellitus ([place] [address] )     Enteritis 08/23/2016    Gastroparesis due to DM ([place] [address] ) 08/23/2016    GERD (gastroesophageal reflux disease)     Hernia, ventral 08/04/2016    Hyperlipidemia     Hypertension     Postoperative visit 3/2/2022       Past Surgical History:   Procedure Laterality Date    COLOSTOMY N/A 2/20/2022    Procedure: COLOSTOMY LOOP, diverting;  Surgeon: Sandra Dorman MD;  Location: [facility];  Service: General    ESOPHAGOGASTRODUODENOSCOPY N/A 8/24/2016    Procedure: ESOPHAGOGASTRODUODENOSCOPY (EGD); Surgeon: Sharlene Menard MD;  Location: [facility];   Service:     IR PORT PLACEMENT  3/10/2022    KIDNEY STONE SURGERY      LIVER BIOPSY LAPAROSCOPIC N/A 2/20/2022    Procedure: DIAGNOSTIC LAPAROSCOPIC LIVER BIOPSY, DIVERTING LOOP COLOSTOMY ;  Surgeon: Sandra Dorman MD;  [facility];  Service: General    TONSILLECTOMY      UMBILICAL HERNIA REPAIR      UMBILICAL HERNIA REPAIR LAPAROSCOPIC N/A 4/26/2019    Procedure: LAPAROSCOPIC UMBILICAL HERNIA REPAIR;  Surgeon: Sandra Dorman MD;  [facility];  Service: General       Family History   Problem Relation Age of Onset    Diabetes Mother         mellitus    Lung cancer Mother     Coronary artery disease Father        Social History Socioeconomic History    Marital status: /Civil Union     Spouse name: Not on file    Number of children: Not on file    Years of education: Not on file    Highest education level: Not on file   Occupational History    Occupation: ACTOR     Employer: [employer]   Tobacco Use    Smoking status: Never Smoker    Smokeless tobacco: Never Used   Vaping Use    Vaping Use: Never used   Substance and Sexual Activity    Alcohol use: Not Currently     Comment: occasion    Drug use: No    Sexual activity: Yes     Partners: Female   Other Topics Concern    Not on file   Social History Narrative    Not on file     Social Determinants of Health     Financial Resource Strain: Not on file   Food Insecurity: No Food Insecurity    Worried About [address] in the Last Year: Never true    [address] in the Last Year: Never true   Transportation Needs: No Transportation Needs    Lack of Transportation (Medical): No    Lack of Transportation (Non-Medical): No   Physical Activity: Insufficiently Active    Days of Exercise per Week: 5 days    Minutes of Exercise per Session: 10 min   Stress: No Stress Concern Present    Feeling of Stress : Not at all   Social Connections: Not on file   Intimate Partner Violence: Not on file   Housing Stability: Low Risk     Unable to Pay for Housing in the Last Year: No    Number of Places Lived in the Last Year: 1    Unstable Housing in the Last Year: No       Current Outpatient Medications   Medication Sig Dispense Refill    amLODIPine (NORVASC) 5 mg tablet TAKE 1 TABLET BY MOUTH TWICE A  tablet 0    aspirin 81 MG tablet Take 81 mg by mouth daily        atorvastatin (LIPITOR) 40 mg tablet TAKE 1 TABLET BY MOUTH EVERY DAY 90 tablet 3    B-D UF III MINI PEN NEEDLES 31G X 5 MM MISC BY DOES NOT APPLY ROUTE 4 (FOUR) TIMES A  each 3    Cholecalciferol (VITAMIN D3 PO) Take 400 Units by mouth daily      Continuous Blood Gluc Sensor (FreeStyle Parrish 14 Day Sensor) MISC Use 1 each every 14 (fourteen) days 2 each 6    fluorouracil 5,210 mg in CADD infusion pump Infuse 5,210 mg (1,200 mg/m2/day x 2 17 m2) into a venous catheter over 46 hours for 2 days  Do not start before May 4, 2022  1 each 0    glyBURIDE-metFORMIN (GLUCOVANCE) 5-500 MG per tablet Take 1 tablet by mouth daily with breakfast Taking 1 tablet in the morning       insulin glargine (Lantus) 100 units/mL subcutaneous injection Inject 10 Units under the skin daily at bedtime (Patient taking differently: Inject 20 Units under the skin daily at bedtime Pt increased his insulin to 20 units ) 10 mL 0    Januvia 100 MG tablet TAKE 1 TABLET BY MOUTH EVERY DAY 90 tablet 3    Lantus SoloStar 100 units/mL injection pen INJECT 30 UNITS UNDER THE SKIN DAILY 15 mL 1    lisinopril (ZESTRIL) 40 mg tablet TAKE 1 TABLET BY MOUTH EVERY DAY 90 tablet 3    metoclopramide (REGLAN) 10 mg tablet Take 1 tablet (10 mg total) by mouth 2 (two) times a day 180 tablet 0    Multiple Vitamins-Minerals (multivitamin with minerals) tablet Take 1 tablet by mouth daily      Needle, Disp, (HYPODERMIC NEEDLE) 23G X 1-1/2" MISC by Does not apply route once a week 10 each 6    ondansetron (ZOFRAN) 4 mg tablet Take 4 mg by mouth every 6 (six) hours as needed      Ostomy Supplies MISC Use in the morning 100 each 3    pantoprazole (PROTONIX) 40 mg tablet TAKE 1 TABLET BY MOUTH TWICE A  tablet 3     No current facility-administered medications for this visit  DATA REVIEW:    Pathology Result:    Final Diagnosis   Date Value Ref Range Status   02/20/2022   Final    A  Liver, biopsy:  - Adenocarcinoma, consistent with colorectal primary  Comment: Immunohistochemistry was performed on block A1  The tumor cells are positive for CK20, SATB2, CDX2,  and negative for TTF-1, NKX3 1, Ck7; DPC-4 shows no loss, supporting the above diagnosis    Dr Dusty No is notified of the diagnosis in [facility] via [employer] on 2/24/2022  at 11:36 am    Immunohistochemistry [facility]) testing for Mismatch Repair (MMR) proteins has been requested on block A1, and the results will be issued in an addendum  08/24/2016   Final    A  Small bowel (biopsy):  - Duodenal mucosa with no diagnostic abnormality  - No Marsh lesion  - Negative for dysplasia     B  Stomach, body (biopsy):  - Chronic inactive oxyntic gastritis  - No H pylori identified on immunohistochemistry stain  - No intestinal metaplasia or dysplasia    C  Distal esophagus (biopsy):  - Cardiac-type mucosa with intestinal metaplasia   - Negative for dysplasia     Comment: This biopsy shows gastric-type mucosa with scattered goblet cells  The diagnosis in this case depends on the location of this biopsy  If this biopsy was taken from the tubular esophagus, it represents Esparza mucosa of the distinctive type  If this biopsy was taken from the gastric cardia, it represents intestinal metaplasia of the gastric cardia  Interpretation performed at [place] [address]            Image Results: They are reviewed and documented in Hematology/Oncology history    XR chest pa & lateral  Narrative: CHEST     INDICATION:   fever r/o pna  COMPARISON:  Chest CT 2/19/2022    EXAM PERFORMED/VIEWS:  XR CHEST PA & LATERAL  The frontal view was performed utilizing dual energy radiographic technique  Images: 5    FINDINGS:  Right chest wall port, tubing tip at the cavoatrial junction  Cardiomediastinal silhouette appears unremarkable  The lungs are clear  No pneumothorax or pleural effusion  Osseous structures appear within normal limits for patient age  Hiatal hernia  Impression: No acute cardiopulmonary disease      Workstation performed: [id number]  CT abdomen pelvis with contrast  Narrative: CT ABDOMEN AND PELVIS WITH IV CONTRAST    INDICATION:   recent colostomy, no output or gas since eating lunch several hours ago arethaal obstruction  COMPARISON:  CT abdomen/pelvis dated February 18, 2022  TECHNIQUE:  CT examination of the abdomen and pelvis was performed  In addition to portal venous phase postcontrast scanning through the abdomen and pelvis, delayed phase postcontrast scanning was performed through the upper abdominal viscera  Axial,   sagittal, and coronal 2D reformatted images were created from the source data and submitted for interpretation  Radiation dose length product (DLP) for this visit:  1875 mGy-cm   This examination, like all CT scans performed in the [facility], was performed utilizing techniques to minimize radiation dose exposure, including the use of iterative   reconstruction and automated exposure control  IV Contrast:  100 mL of iohexol (OMNIPAQUE)  Enteric Contrast:  Enteric contrast was not administered  FINDINGS:    ABDOMEN    LOWER CHEST:  No clinically significant abnormality identified in the visualized lower chest     LIVER/BILIARY TREE:  Again noted are innumerable hypodense lesions compatible with metastatic disease, similar to the prior exam   No intrahepatic biliary ductal dilatation  No portal vein thrombosis  GALLBLADDER:  There are gallstone(s) within the gallbladder, without pericholecystic inflammatory changes  SPLEEN:  The liver is enlarged measuring 14 6 cm in length  PANCREAS:  Unremarkable  ADRENAL GLANDS:  Unremarkable  KIDNEYS/URETERS:  One or more simple renal cyst(s) is noted  No hydronephrosis  One or more sharply circumscribed subcentimeter renal hypodensities are noted  These lesions are too small to accurately characterize, but are statistically most likely to represent benign cortical renal cyst(s)  According to the guidelines published in   the [facility] Paper of the ACR Incidental Findings Committee (Radiology 2010), no further workup of these lesions is recommended      STOMACH AND BOWEL:  Postoperative changes of recent right mid abdominal diverting colostomy are noted  There is no evidence of large or small bowel obstruction  Again noted is an apple core lesion at the distal transverse colon which measures   approximately 5 cm in length  APPENDIX:  No findings to suggest appendicitis  ABDOMINOPELVIC CAVITY:  There is a small amount of intra-abdominal and pelvic ascites  No pneumoperitoneum  Although there is no lymphadenopathy, there are a few nonenlarged lymph nodes adjacent to the patient's distal transverse colonic mass similar   to the prior exam and suspicious for metastatic disease  VESSELS:  Unremarkable for patient's age  PELVIS    REPRODUCTIVE ORGANS:  Unremarkable for patient's age  URINARY BLADDER:  Unremarkable  ABDOMINAL WALL/INGUINAL REGIONS:  Unremarkable  OSSEOUS STRUCTURES:  No acute fracture or destructive osseous lesion  Impression: Postoperative changes of recent right mid abdominal diverting colostomy  There is no evidence of obstruction  The small bowel loops are of normal caliber  Small amount of intra-abdominal and pelvic ascites  No free intraperitoneal air  Innumerable hepatic metastases similar to the prior exam     Reidentified apple core cortical lesion at the distal transverse colon  Workstation performed: [id number]        LABS:  Lab data are reviewed and documented in HemOnc history  No results found for this or any previous visit (from the past 48 hour(s))      Mary Dennis MD  5/6/2022, 11:51 AM

## 2022-05-06 NOTE — TELEPHONE ENCOUNTER
Reached out to patient per Dr Ricardo Rizzo recommendations, he would like patient to get Iron infusions  Reviewed possible side effects and benefits of IV Iron  Patient agreeable to plan  Dr Ricardo Rizzo placed orders for [facility]  Please schedule patient for [facility]

## 2022-05-08 DIAGNOSIS — K21.9 GASTROESOPHAGEAL REFLUX DISEASE WITHOUT ESOPHAGITIS: ICD-10-CM

## 2022-05-08 RX ORDER — PANTOPRAZOLE SODIUM 40 MG/1
TABLET, DELAYED RELEASE ORAL
Qty: 180 TABLET | Refills: 3 | Status: SHIPPED | OUTPATIENT
Start: 2022-05-08

## 2022-05-09 DIAGNOSIS — C18.4 MALIGNANT NEOPLASM OF TRANSVERSE COLON (HCC): Primary | ICD-10-CM

## 2022-05-09 DIAGNOSIS — I10 BENIGN ESSENTIAL HYPERTENSION: Chronic | ICD-10-CM

## 2022-05-09 RX ORDER — AMLODIPINE BESYLATE 5 MG/1
TABLET ORAL
Qty: 180 TABLET | Refills: 0 | Status: SHIPPED | OUTPATIENT
Start: 2022-05-09 | End: 2022-05-24 | Stop reason: SDUPTHER

## 2022-05-09 RX ORDER — FLUOROURACIL 50 MG/ML
400 INJECTION, SOLUTION INTRAVENOUS ONCE
Status: CANCELLED | OUTPATIENT
Start: 2022-05-18

## 2022-05-09 RX ORDER — DEXTROSE MONOHYDRATE 50 MG/ML
20 INJECTION, SOLUTION INTRAVENOUS ONCE
Status: CANCELLED | OUTPATIENT
Start: 2022-05-18

## 2022-05-09 RX ORDER — DEXTROSE MONOHYDRATE 50 MG/ML
20 INJECTION, SOLUTION INTRAVENOUS ONCE
Status: CANCELLED | OUTPATIENT
Start: 2022-06-01

## 2022-05-09 RX ORDER — SODIUM CHLORIDE 9 MG/ML
20 INJECTION, SOLUTION INTRAVENOUS ONCE
Status: CANCELLED | OUTPATIENT
Start: 2022-05-18

## 2022-05-09 RX ORDER — FLUOROURACIL 50 MG/ML
400 INJECTION, SOLUTION INTRAVENOUS ONCE
Status: CANCELLED | OUTPATIENT
Start: 2022-06-01

## 2022-05-09 RX ORDER — SODIUM CHLORIDE 9 MG/ML
20 INJECTION, SOLUTION INTRAVENOUS ONCE
Status: CANCELLED | OUTPATIENT
Start: 2022-06-01

## 2022-05-11 ENCOUNTER — TELEPHONE (OUTPATIENT)
Dept: HEMATOLOGY ONCOLOGY | Facility: CLINIC | Age: 57
End: 2022-05-11

## 2022-05-11 DIAGNOSIS — D50.0 IRON DEFICIENCY ANEMIA DUE TO CHRONIC BLOOD LOSS: Primary | ICD-10-CM

## 2022-05-11 RX ORDER — SODIUM CHLORIDE 9 MG/ML
20 INJECTION, SOLUTION INTRAVENOUS ONCE
Status: CANCELLED | OUTPATIENT
Start: 2022-05-18

## 2022-05-11 NOTE — TELEPHONE ENCOUNTER
Spoke w/ patient to reschedule Dr Mathieu Brandon appointment  He said he spoke w/ the infusion center and his insurance will not cover 2 venofer infusions, they will only cover 4  Can you please contact the infusion center and this patient in regards to this matter?  Thank you

## 2022-05-11 NOTE — TELEPHONE ENCOUNTER
Title: Medication change due to insurance  Date patient scheduled: 5/13/22    Original medication ordered: Feraheme    New Medication ordered: Venofer      Office to route to [facility] to notify pharmacy  Received notification on 5/11/22 that patient's insurance does not cover Feraheme, but accepts Venofer  Noted in Dr Liz Gonzales note recommended to change Feraheme to Venofer  Patient notified

## 2022-05-11 NOTE — TELEPHONE ENCOUNTER
Spoke with patient earlier  Pt is scheduled for 4 Venofer treatments  2 with his chemo and 2 on his off weeks    (Please use the quick note option when replying)

## 2022-05-12 ENCOUNTER — TELEPHONE (OUTPATIENT)
Dept: HEMATOLOGY ONCOLOGY | Facility: CLINIC | Age: 57
End: 2022-05-12

## 2022-05-12 DIAGNOSIS — R53.83 OTHER FATIGUE: ICD-10-CM

## 2022-05-12 DIAGNOSIS — C18.4 MALIGNANT NEOPLASM OF TRANSVERSE COLON (HCC): Primary | ICD-10-CM

## 2022-05-12 DIAGNOSIS — E66.01 MORBID OBESITY (HCC): ICD-10-CM

## 2022-05-12 DIAGNOSIS — E11.9 TYPE 2 DIABETES MELLITUS WITHOUT COMPLICATION, WITHOUT LONG-TERM CURRENT USE OF INSULIN (HCC): ICD-10-CM

## 2022-05-12 NOTE — TELEPHONE ENCOUNTER
Patients last infusion treatment is scheduled for 6/1  There are no more scheduled after that  If Dr Brien Pino can sign more orders, these can be scheduled  Patient is also scheduled for a NEVA with Dr Yolanda Townsend on 6/15

## 2022-05-13 RX ORDER — PEN NEEDLE, DIABETIC 31 GX5/16"
NEEDLE, DISPOSABLE MISCELLANEOUS DAILY
Qty: 100 EACH | Refills: 3 | Status: SHIPPED | OUTPATIENT
Start: 2022-05-13

## 2022-05-14 LAB
ALBUMIN SERPL-MCNC: 3.6 G/DL (ref 3.8–4.9)
ALBUMIN/GLOB SERPL: 1.6 {RATIO} (ref 1.2–2.2)
ALP SERPL-CCNC: 243 IU/L (ref 44–121)
ALT SERPL-CCNC: 21 IU/L (ref 0–44)
APPEARANCE UR: CLEAR
AST SERPL-CCNC: 30 IU/L (ref 0–40)
BACTERIA URNS QL MICRO: NORMAL
BASOPHILS # BLD AUTO: 0.1 X10E3/UL (ref 0–0.2)
BASOPHILS NFR BLD AUTO: 1 %
BILIRUB SERPL-MCNC: 0.5 MG/DL (ref 0–1.2)
BILIRUB UR QL STRIP: NEGATIVE
BUN SERPL-MCNC: 17 MG/DL (ref 6–24)
BUN/CREAT SERPL: 16 (ref 9–20)
CALCIUM SERPL-MCNC: 9.1 MG/DL (ref 8.7–10.2)
CANCER AG19-9 SERPL-ACNC: 1247 U/ML (ref 0–35)
CASTS URNS QL MICRO: NORMAL /LPF
CEA SERPL-MCNC: 1473 NG/ML (ref 0–4.7)
CHLORIDE SERPL-SCNC: 101 MMOL/L (ref 96–106)
CO2 SERPL-SCNC: 22 MMOL/L (ref 20–29)
COLOR UR: YELLOW
CREAT SERPL-MCNC: 1.08 MG/DL (ref 0.76–1.27)
EGFR: 80 ML/MIN/1.73
EOSINOPHIL # BLD AUTO: 0.2 X10E3/UL (ref 0–0.4)
EOSINOPHIL NFR BLD AUTO: 3 %
EPI CELLS #/AREA URNS HPF: NORMAL /HPF (ref 0–10)
ERYTHROCYTE [DISTWIDTH] IN BLOOD BY AUTOMATED COUNT: 23.6 % (ref 11.6–15.4)
GLOBULIN SER-MCNC: 2.2 G/DL (ref 1.5–4.5)
GLUCOSE SERPL-MCNC: 215 MG/DL (ref 65–99)
GLUCOSE UR QL: ABNORMAL
HCT VFR BLD AUTO: 34.4 % (ref 37.5–51)
HGB BLD-MCNC: 11.3 G/DL (ref 13–17.7)
HGB UR QL STRIP: NEGATIVE
IMM GRANULOCYTES # BLD: 0.1 X10E3/UL (ref 0–0.1)
IMM GRANULOCYTES NFR BLD: 1 %
KETONES UR QL STRIP: NEGATIVE
LEUKOCYTE ESTERASE UR QL STRIP: NEGATIVE
LYMPHOCYTES # BLD AUTO: 1.9 X10E3/UL (ref 0.7–3.1)
LYMPHOCYTES NFR BLD AUTO: 24 %
MCH RBC QN AUTO: 27.8 PG (ref 26.6–33)
MCHC RBC AUTO-ENTMCNC: 32.8 G/DL (ref 31.5–35.7)
MCV RBC AUTO: 85 FL (ref 79–97)
MICRO URNS: ABNORMAL
MONOCYTES # BLD AUTO: 0.7 X10E3/UL (ref 0.1–0.9)
MONOCYTES NFR BLD AUTO: 9 %
MORPHOLOGY BLD-IMP: ABNORMAL
NEUTROPHILS # BLD AUTO: 4.9 X10E3/UL (ref 1.4–7)
NEUTROPHILS NFR BLD AUTO: 62 %
NITRITE UR QL STRIP: NEGATIVE
PH UR STRIP: 5.5 [PH] (ref 5–7.5)
PLATELET # BLD AUTO: 293 X10E3/UL (ref 150–450)
POTASSIUM SERPL-SCNC: 4.1 MMOL/L (ref 3.5–5.2)
PROT SERPL-MCNC: 5.8 G/DL (ref 6–8.5)
PROT UR QL STRIP: ABNORMAL
RBC # BLD AUTO: 4.07 X10E6/UL (ref 4.14–5.8)
RBC #/AREA URNS HPF: NORMAL /HPF (ref 0–2)
SODIUM SERPL-SCNC: 138 MMOL/L (ref 134–144)
SP GR UR: 1.02 (ref 1–1.03)
UROBILINOGEN UR STRIP-ACNC: 0.2 MG/DL (ref 0.2–1)
WBC # BLD AUTO: 7.7 X10E3/UL (ref 3.4–10.8)
WBC #/AREA URNS HPF: NORMAL /HPF (ref 0–5)

## 2022-05-17 ENCOUNTER — TELEPHONE (OUTPATIENT)
Dept: HEMATOLOGY ONCOLOGY | Facility: CLINIC | Age: 57
End: 2022-05-17

## 2022-05-17 NOTE — TELEPHONE ENCOUNTER
----- Message from Hannah Henry MD sent at 5/16/2022  4:53 PM EDT -----  I explained to the patient about the CA 19-9 and CEA  Patient will continue current management plan  We wait for the restaging CT scan to make final decision  Hannah Henry MD, PhD  ----- Message -----  From: Kathy Borges RN  Sent: 5/16/2022  10:55 AM EDT  To: Hannah Henry MD    Patient is asking for a call back in regards to his CA 19-9 levels and Carcinoembryonic antigen levels

## 2022-05-18 ENCOUNTER — NUTRITION (OUTPATIENT)
Dept: NUTRITION | Facility: CLINIC | Age: 57
End: 2022-05-18

## 2022-05-18 ENCOUNTER — HOSPITAL ENCOUNTER (OUTPATIENT)
Dept: INFUSION CENTER | Facility: CLINIC | Age: 57
Discharge: HOME | End: 2022-05-18
Payer: COMMERCIAL

## 2022-05-18 ENCOUNTER — PATIENT OUTREACH (OUTPATIENT)
Dept: CASE MANAGEMENT | Facility: HOSPITAL | Age: 57
End: 2022-05-18

## 2022-05-18 VITALS
TEMPERATURE: 97.7 F | HEART RATE: 87 BPM | HEIGHT: 68 IN | SYSTOLIC BLOOD PRESSURE: 144 MMHG | WEIGHT: 236.6 LBS | DIASTOLIC BLOOD PRESSURE: 90 MMHG | BODY MASS INDEX: 35.86 KG/M2 | RESPIRATION RATE: 18 BRPM

## 2022-05-18 DIAGNOSIS — Z71.3 NUTRITIONAL COUNSELING: Primary | ICD-10-CM

## 2022-05-18 DIAGNOSIS — D50.0 IRON DEFICIENCY ANEMIA DUE TO CHRONIC BLOOD LOSS: ICD-10-CM

## 2022-05-18 DIAGNOSIS — C18.4 MALIGNANT NEOPLASM OF TRANSVERSE COLON (HCC): Primary | ICD-10-CM

## 2022-05-18 PROCEDURE — 96411 CHEMO IV PUSH ADDL DRUG: CPT

## 2022-05-18 PROCEDURE — 96415 CHEMO IV INFUSION ADDL HR: CPT

## 2022-05-18 PROCEDURE — 96368 THER/DIAG CONCURRENT INF: CPT

## 2022-05-18 PROCEDURE — 96417 CHEMO IV INFUS EACH ADDL SEQ: CPT

## 2022-05-18 PROCEDURE — 96413 CHEMO IV INFUSION 1 HR: CPT

## 2022-05-18 PROCEDURE — G0498 CHEMO EXTEND IV INFUS W/PUMP: HCPCS

## 2022-05-18 PROCEDURE — 96367 TX/PROPH/DG ADDL SEQ IV INF: CPT

## 2022-05-18 RX ORDER — SODIUM CHLORIDE 9 MG/ML
20 INJECTION, SOLUTION INTRAVENOUS ONCE
Status: CANCELLED | OUTPATIENT
Start: 2022-05-26

## 2022-05-18 RX ORDER — SODIUM CHLORIDE 9 MG/ML
20 INJECTION, SOLUTION INTRAVENOUS ONCE
Status: COMPLETED | OUTPATIENT
Start: 2022-05-18 | End: 2022-05-18

## 2022-05-18 RX ORDER — DEXTROSE MONOHYDRATE 50 MG/ML
20 INJECTION, SOLUTION INTRAVENOUS ONCE
Status: COMPLETED | OUTPATIENT
Start: 2022-05-18 | End: 2022-05-18

## 2022-05-18 RX ORDER — FLUOROURACIL 50 MG/ML
400 INJECTION, SOLUTION INTRAVENOUS ONCE
Status: COMPLETED | OUTPATIENT
Start: 2022-05-18 | End: 2022-05-18

## 2022-05-18 RX ADMIN — FLUOROURACIL 880 MG: 50 INJECTION, SOLUTION INTRAVENOUS at 14:53

## 2022-05-18 RX ADMIN — LEUCOVORIN CALCIUM 880 MG: 350 INJECTION, POWDER, LYOPHILIZED, FOR SOLUTION INTRAMUSCULAR; INTRAVENOUS at 12:48

## 2022-05-18 RX ADMIN — DEXAMETHASONE SODIUM PHOSPHATE: 10 INJECTION, SOLUTION INTRAMUSCULAR; INTRAVENOUS at 11:26

## 2022-05-18 RX ADMIN — SODIUM CHLORIDE 300 MG: 0.9 INJECTION, SOLUTION INTRAVENOUS at 09:54

## 2022-05-18 RX ADMIN — OXALIPLATIN 187 MG: 5 INJECTION, SOLUTION INTRAVENOUS at 12:50

## 2022-05-18 RX ADMIN — SODIUM CHLORIDE 20 ML/HR: 0.9 INJECTION, SOLUTION INTRAVENOUS at 11:26

## 2022-05-18 RX ADMIN — BEVACIZUMAB-AWWB 540 MG: 400 INJECTION, SOLUTION INTRAVENOUS at 12:00

## 2022-05-18 RX ADMIN — SODIUM CHLORIDE 20 ML/HR: 0.9 INJECTION, SOLUTION INTRAVENOUS at 09:53

## 2022-05-18 RX ADMIN — DEXTROSE 20 ML/HR: 5 SOLUTION INTRAVENOUS at 12:46

## 2022-05-18 NOTE — PROGRESS NOTES
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  They have not followed up on any family counseling because they feel that they are doing well at this point  He says that they sit down once a week and have a family meeting, most of which is focused around his health and tx, and he thinks that this has been helpful for the kids to be able to be informed and aware, and ask questions as needed  He was also happy to share that he has contacted his previous employer and 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 to see how he can tolerate it  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

## 2022-05-18 NOTE — PROGRESS NOTES
Pt presents for MVASI, Oxaliplatin, leucovorin and Fuorouracil  infusions offering no compliants  Pt BP upon arrival elevated, spoke with Su Muñoz RN, per Dr Barbara alberts to continue treatment  Pt tolerated treatment without incident  Connected Elastomeric Infusion Device to patient and clamps open  Double RN checked with GM RN  Pt aware of when to return on Friday for disconnection  AVS declined  Next appointment reviewed

## 2022-05-18 NOTE — PROGRESS NOTES
Received notification from infusion in regards to patient bp being 144/90 during infusion appointment  Reviewed that patient is asymptomatic at this time  Reviewed all with Dr Jeannine Quiroz and aristeo for treatment on 5/18/22 as long as patient is asymptomatic  Infusion RN notified

## 2022-05-18 NOTE — PATIENT INSTRUCTIONS
Nutrition Rx & Recommendations:  Diet: High Calorie, High Protein (for high calorie foods see pages 52-53, and for high protein foods see pages 49-51 in your Eating Hints book)  Keep a daily food journal (pen/paper, bossman such as [employer])  Small, frequent meals/snacks may be easier to tolerate than 3 large daily meals  Aim for 5-6 small meals per day (every 2-3 hours)  Include protein at all meals/snacks  Include a variety of foods (as tolerated/allowed by diet)  Stay hydrated by sipping fluids of choice/tolerance throughout the day  Alter food choices and eating patterns to accommodate changing needs  Weigh yourself regularly  If you notice weight loss, make an effort to increase your daily food/calorie intake  If you continue to notice loss after these efforts, reach out to your dietitian to establish a plan to stabilize weight  Avoid gas-producing foods such as broccoli, cabbage, sauerkraut, [place] sprouts, cauliflower, corn, cucumbers, onions, peppers, beans, peas, lentils, apples, apple juice, avocado, and melons  Carbonated drinks, chewing gum, and drinking through a straw can also cause gas  Limiting lactose may help for those who are sensitive to milk products  Spread carbohydrate intake throughout the day for glycemic control  Continue Premier Protein daily  For extra calories: add extra sauces/gravies, keep non perishable snacks nearby, choose liquids with calories, add extra cream cheese/cheese to foods as tolerated  Nutrition after colostomy placement:  After 6 weeks post surgery you can start adding foods that you have been avoiding and back into your diet  Add new foods slowly, one at a time, and chew them well  Food choices and their effects:   Foods that may cause diarrhea or increased output: fresh or raw fruit, fresh or raw vegetables, fried or spicy foods, high sugar beverages, legumes, milk and dairy foods, prune juice, sugar alcohols (sorbitol, mannitol, xylitol)  Foods that may help thicken stool: applesauce, bananas, cheese, marshmallows, oatmeal, creamy peanut butter, potatoes (no skin), tapioca pudding, white bread, pretzels, saltine crackers, white rice, white pasta/noodles, yogurt  Foods that may cause gas or odor: eggs, onions, peanuts, legumes, carbonated drinks, cruciferous vegetables (Brussel's sprouts, cauliflower, broccoli, cabbage), chewing gum  Foods that may help relieve gas and odor: buttermilk, cranberry juice, parsley, yogurt with active cultures  See "Diet and Nutrition After Colostomy" handout for additional suggestions on foods to choose and foods to avoid/limit after ostomy placement       Follow Up Plan: During infusion 6/1/22  Recommend Referral to Other Providers: none at this time

## 2022-05-18 NOTE — PROGRESS NOTES
Outpatient Oncology Nutrition Consultation   Type of Consult: Follow Up  Care Location: Infusion Center    Reason for referral: From Infusion RN on 3/23/22 (reason for referral: Malnutrition Screening Tool (MST) Triggers: scored a 4 indicating >/=34# (>/=15 4 kg) recent wt loss and is eating poorly due to a decreased appetite  Date of MST: 3/23/22 )    Nutrition Assessment:   Oncology Diagnosis & Treatments: Diagnosed with colon cancer 3/1/22  · S/p colostomy 2/20/22  · Began chemotherapy (adrucil, oxaliplatin, mvasi) 3/23/22      Oncology History   Malignant neoplasm of transverse colon ([facility] [address] )   3/1/2022 Initial Diagnosis    Malignant neoplasm of transverse colon ([facility] [address] )     3/23/2022 -  Chemotherapy    fluorouracil (ADRUCIL), 400 mg/m2 = 905 mg, Intravenous, Once, 5 of 9 cycles  Administration: 905 mg (3/23/2022), 875 mg (4/6/2022), 875 mg (4/20/2022), 870 mg (5/4/2022)  leucovorin calcium IVPB, 904 mg, Intravenous, Once, 5 of 9 cycles  Administration: 900 mg (3/23/2022), 876 mg (4/6/2022), 876 mg (4/20/2022), 868 mg (5/4/2022)  oxaliplatin (ELOXATIN) chemo infusion, 85 mg/m2 = 192 1 mg, Intravenous, Once, 5 of 9 cycles  Administration: 192 1 mg (3/23/2022), 186 15 mg (4/6/2022), 186 15 mg (4/20/2022), 184 45 mg (5/4/2022)  fluorouracil (ADRUCIL) ambulatory infusion Soln, 1,200 mg/m2/day = 5,425 mg, Intravenous, Over 46 hours, 5 of 9 cycles  bevacizumab-awwb (MVASI) IVPB, 5 mg/kg = 545 mg (100 % of original dose 5 mg/kg), Intravenous, Once, 5 of 9 cycles  Dose modification: 5 mg/kg (original dose 5 mg/kg, Cycle 1)  Administration: 545 mg (3/23/2022), 500 mg (4/6/2022), 500 mg (4/20/2022), 500 mg (5/4/2022)       Past Medical & Surgical Hx: T2DM, gastroparesis, HLD, CKD, GERD  Patient Active Problem List   Diagnosis    Type 2 diabetes mellitus without complication, without long-term current use of insulin (HCC)    Benign essential hypertension    Mixed hyperlipidemia    Erectile dysfunction    Low testosterone    Testicular hypogonadism    Morbid obesity ([place] [address] )    Incarcerated umbilical hernia    Left ureteral calculus    Type 2 diabetes mellitus with hyperlipidemia (HCC)    Colonic mass    Microcytic anemia    Hypokalemia    Transaminitis    Stage 3a chronic kidney disease (HCC)    Thrombocytosis    Iron deficiency anemia, unspecified    Malignant neoplasm of transverse colon ([place] [address] )    Cancer of transverse colon ([place] [address] )    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)    Colon cancer metastasized to liver ([place] [address] )    SIRS (systemic inflammatory response syndrome) ([place] [address] )     Past Medical History:   Diagnosis Date    Abdominal pain 3/12/2022    Acute renal failure ([place] [address] )     [id number] resolved    Diabetes mellitus ([place] [address] )     Enteritis 08/23/2016    Gastroparesis due to DM ([place] [address] ) 08/23/2016    GERD (gastroesophageal reflux disease)     Hernia, ventral 08/04/2016    Hyperlipidemia     Hypertension     Postoperative visit 3/2/2022     Past Surgical History:   Procedure Laterality Date    COLOSTOMY N/A 2/20/2022    Procedure: COLOSTOMY LOOP, diverting;  Surgeon: Bg Kaminski MD;  [facility]: [facility];  Service: General    ESOPHAGOGASTRODUODENOSCOPY N/A 8/24/2016    Procedure: ESOPHAGOGASTRODUODENOSCOPY (EGD); Surgeon: Ysabel Briggs MD;  [facility];   Service:     IR PORT PLACEMENT  3/10/2022    KIDNEY STONE SURGERY      LIVER BIOPSY LAPAROSCOPIC N/A 2/20/2022    Procedure: DIAGNOSTIC LAPAROSCOPIC LIVER BIOPSY, DIVERTING LOOP COLOSTOMY ;  Surgeon: Bg Kaminski MD;  [facility];  Service: General    TONSILLECTOMY      UMBILICAL HERNIA REPAIR      UMBILICAL HERNIA REPAIR LAPAROSCOPIC N/A 4/26/2019    Procedure: LAPAROSCOPIC UMBILICAL HERNIA REPAIR;  Surgeon: Bg Kaminski MD;  [facility];  Service: General       Review of Medications:   Vitamins, Supplements and Herbals: Med List Reviewed & pt is only taking: vitamin D 400IU; venofer with infusion today Current Outpatient Medications:     amLODIPine (NORVASC) 5 mg tablet, TAKE 1 TABLET BY MOUTH TWICE A DAY, Disp: 180 tablet, Rfl: 0    aspirin 81 MG tablet, Take 81 mg by mouth daily  , Disp: , Rfl:     atorvastatin (LIPITOR) 40 mg tablet, TAKE 1 TABLET BY MOUTH EVERY DAY, Disp: 90 tablet, Rfl: 3    Cholecalciferol (VITAMIN D3 PO), Take 400 Units by mouth daily, Disp: , Rfl:     Continuous Blood Gluc Sensor (FreeStyle Parrish 14 Day Sensor) MISC, Use 1 each every 14 (fourteen) days, Disp: 2 each, Rfl: 6    fluorouracil 5,280 mg in CADD/Elastomeric Infusion Device, Infuse 5,280 mg (1,200 mg/m2/day x 2 2 m2) into a venous catheter over 46 hours for 2 days  Do not start before May 18, 2022 , Disp: 1 each, Rfl: 0    glyBURIDE-metFORMIN (GLUCOVANCE) 5-500 MG per tablet, Take 1 tablet by mouth daily with breakfast Taking 1 tablet in the morning , Disp: , Rfl:     insulin glargine (Lantus) 100 units/mL subcutaneous injection, Inject 10 Units under the skin daily at bedtime (Patient taking differently: Inject 20 Units under the skin daily at bedtime Pt increased his insulin to 20 units ), Disp: 10 mL, Rfl: 0    Insulin Pen Needle (B-D UF III MINI PEN NEEDLES) 31G X 5 MM MISC, Inject under the skin daily, Disp: 100 each, Rfl: 3    Januvia 100 MG tablet, TAKE 1 TABLET BY MOUTH EVERY DAY, Disp: 90 tablet, Rfl: 3    Lantus SoloStar 100 units/mL injection pen, INJECT 30 UNITS UNDER THE SKIN DAILY, Disp: 15 mL, Rfl: 1    lisinopril (ZESTRIL) 40 mg tablet, TAKE 1 TABLET BY MOUTH EVERY DAY, Disp: 90 tablet, Rfl: 3    metoclopramide (REGLAN) 10 mg tablet, Take 1 tablet (10 mg total) by mouth 2 (two) times a day, Disp: 180 tablet, Rfl: 0    Multiple Vitamins-Minerals (multivitamin with minerals) tablet, Take 1 tablet by mouth daily, Disp: , Rfl:     Needle, Disp, (HYPODERMIC NEEDLE) 23G X 1-1/2" MISC, by Does not apply route once a week, Disp: 10 each, Rfl: 6    ondansetron (ZOFRAN) 4 mg tablet, Take 4 mg by mouth every 6 (six) hours as needed, Disp: , Rfl:     Ostomy Supplies MISC, Use in the morning, Disp: 100 each, Rfl: 3    pantoprazole (PROTONIX) 40 mg tablet, TAKE 1 TABLET BY MOUTH TWICE A DAY, Disp: 180 tablet, Rfl: 3  No current facility-administered medications for this visit      Facility-Administered Medications Ordered in Other Visits:     bevacizumab-awwb (MVASI) 540 mg in sodium chloride 0 9 % 100 mL IVPB, 5 mg/kg (Treatment Plan Recorded), Intravenous, Once, Sharifa Chambers MD    dextrose 5 % infusion, 20 mL/hr, Intravenous, Once, Sharifa Chambers MD    fluorouracil (ADRUCIL) injection 880 mg, 400 mg/m2 (Treatment Plan Recorded), Intravenous, Once, Sharifa Chambers MD    iron sucrose (VENOFER) 300 mg in sodium chloride 0 9 % 250 mL IVPB, 300 mg, Intravenous, Once, Sharifa Chambers MD, 300 mg at 05/18/22 0954    leucovorin 880 mg in dextrose 5 % 250 mL IVPB, 400 mg/m2 (Treatment Plan Recorded), Intravenous, Once, Sharifa Chambers MD    ondansetron [facility]) 16 mg, dexamethasone (DECADRON) 10 mg in sodium chloride 0 9 % 50 mL IVPB, , Intravenous, Once, Sharifa Chambers MD    oxaliplatin (ELOXATIN) 187 mg in dextrose 5 % 250 mL chemo infusion, 85 mg/m2 (Treatment Plan Recorded), Intravenous, Once, Sharifa Chambers MD    sodium chloride 0 9 % infusion, 20 mL/hr, Intravenous, Once, Sharifa Chambers MD    Most Recent Lab Results: Checks BG at home: 90-100mg/dL in the AM, ~200mg/dL in the evening   Lab Results   Component Value Date    WBC 7 7 05/13/2022    NEUTROABS 4 9 05/13/2022    IRON 26 (L) 02/20/2022    TIBC 235 (L) 02/20/2022    FERRITIN 125 02/20/2022    CHOLESTEROL 125 08/26/2021    CHOL 91 (L) 04/10/2017    TRIG 75 08/26/2021    HDL 45 08/26/2021    LDLCALC 65 08/26/2021    ALT 21 05/13/2022    AST 30 05/13/2022    ALB 3 6 (L) 05/13/2022     05/02/2017     04/10/2017    SODIUM 138 05/13/2022    SODIUM 137 04/29/2022    K 4 1 05/13/2022    K 3 6 04/29/2022     05/13/2022    BUN 17 05/13/2022 BUN 15 04/29/2022    CREATININE 1 08 05/13/2022    CREATININE 0 97 04/29/2022    EGFR 80 05/13/2022    TSH 0 719 08/26/2021    GLUCOSE 118 (H) 05/02/2017    POCGLU 309 (H) 03/14/2022    GLUF 246 (H) 03/14/2022    GLUC 215 (H) 05/13/2022    HGBA1C 7 5 (H) 02/10/2022    HGBA1C 8 0 (H) 08/26/2021    HGBA1C 9 9 (H) 04/10/2021    CALCIUM 7 9 (L) 03/14/2022       Anthropometric Measurements:   Height: 68"  Ht Readings from Last 1 Encounters:   05/18/22 5' 7 99" (1 727 m)     Wt Readings from Last 20 Encounters:   05/18/22 107 kg (236 lb 9 6 oz)   05/06/22 108 kg (237 lb)   05/04/22 101 kg (223 lb 5 2 oz)   04/28/22 106 kg (232 lb 9 6 oz)   04/20/22 104 kg (229 lb 3 2 oz)   04/06/22 104 kg (228 lb 6 4 oz)   04/04/22 103 kg (227 lb)   03/23/22 105 kg (231 lb 11 3 oz)   03/18/22 105 kg (231 lb 3 2 oz)   03/17/22 105 kg (232 lb)   03/14/22 110 kg (241 lb 10 oz)   03/10/22 108 kg (239 lb)   03/07/22 109 kg (239 lb 6 4 oz)   03/02/22 108 kg (239 lb)   03/01/22 109 kg (240 lb)   02/18/22 133 kg (293 lb 3 4 oz)   03/23/21 133 kg (292 lb 9 6 oz)   03/03/20 131 kg (289 lb)   03/02/20 133 kg (293 lb 6 4 oz)   02/04/20 126 kg (277 lb)       Weight History:    Usual Weight: 290#   Varian: n/a   Home Scale: none     Oncology Nutrition-Anthropometrics    Flowsheet Row Nutrition from 5/18/2022 in [address] [place] [address] Nutrition from 5/4/2022 in [place] [address] Oncology Dietitian [place]   Patient age (years): 61 years 61 years   Patient (male) height (in): 76 in 76 in   Current weight (lbs): 236 4 lbs 223 3 lbs   Current weight to be used for anthropometric calculations (kg) 107 5 kg 101 5 kg   BMI: 35 9 33 9   IBW male 154 lb 154 lb   IBW (kg) male 70 kg 70 kg   IBW % (male) 153 5 % 145 %   Adjusted BW (male): 174 6 lbs 171 3 lbs   Adjusted BW in kg (male): 79 4 kg 77 9 kg   % weight change after 1 week: -- -4 %   Weight change after 1 week (lbs) -- -9 3 lbs   % weight change after 1 month: 3 1 % -1 6 %   Weight change after 1 month (lbs) 7 2 lbs -3 7 lbs   % weight change after 3 months: -19 4 % -23 8 %   Weight change after 3 months (lbs) -56 8 lbs -69 9 lbs          Nutrition-Focused Physical Findings: none observed    Food/Nutrition-Related History & Client/Social History:    Current Nutrition Impact Symptoms:  [] Nausea  [] Reduced Appetite  [] Acid Reflux    [] Vomiting  [x] Unintended Wt Loss -significant x3 months  [] Malabsorption    [] Diarrhea  [] Unintended Wt Gain  [] Dumping Syndrome    [] Constipation  [] Thick Mucous/Secretions  [] Abdominal Pain    [] Dysgeusia (Altered Taste)  [] Xerostomia (Dry Mouth)  [] Gas    [] Dysosmia (Altered Smell)  [] Thrush  [] Difficulty Chewing    [] Oral Mucositis (Sore Mouth)  [] Fatigue  [x] Hyperglycemia: hba1c 7 5% on 2/10/22; BG nonfasting 215mg/dL on 5/13/22; BG fasting 246mg/dL on 3/14/22  [] Odynophagia  [] Esophagitis  [] Other:    [] Dysphagia  [] Early Satiety  [x] No Problems Eating      Food Allergies & Intolerances: no    Current Diet: Avoiding cold temperatures while on Oxaliplatin and reintroducing foods post- colostomy  Current Nutrition Intake: Increased since last visit  Appetite: Good  Nutrition Route: PO  Oral Care: brushes BID  Activity level: ADL, feeling more energy         25 Hr Diet Recall:   Breakfast: corn flakes, yogurt OR eggs OR toast with extra cream cheese OR french toast OR donut   Lunch: tuna sandwich   Snack: tortilla chips, humus    Dinner: ground beef burrito OR red meat, chicken, or salmon with carrots/string beans/rice    Snack: Vanilla wafers     Beverages: water (12oz x1-3), ginger ale (8oz x1)   Supplements:    Premier Protein Shake (11 oz, 160 kcal, 30 g pro) 1x daily      Oncology Nutrition-Estimated Needs    Flowsheet Row Nutrition from 5/18/2022 in [address] [place] [address] Nutrition from 5/4/2022 in [place] [address] Oncology Dietitian [place]   Weight type used Actual weight Actual weight   Weight in kilograms (kg) used for estimated needs 107 5 kg 101 5 kg   Energy needs formula:  35-40 kcal/kg 35-40 kcal/kg   Energy needs based on 35 kcal/k kcal 3553 kcal   Energy needs based on 40 kcal/k kcal 4060 kcal   Protein needs formula: 1 5-2 g/kg 1 5-2 g/kg   Protein needs based on 1 5 g/kg 161 g 152 g   Protein needs based on 2 g/kg 215 g 203 g   Fluid needs formula: 30-35 mL/kg 30-35 mL/kg   Fluid needs based on 30 mL/kg 3219 mL 3042 mL   Fluid needs in ounces 109 oz 103 oz   Fluid needs based on 35 mL/kg 3756 mL 3549 mL   Fluid needs in ounces 127 oz 120 oz           Discussion & Intervention:   Ivon Campbell was evaluated today for an RD follow up regarding wt loss and diet post colostomy  Ivon Campbell is currently undergoing tx for colon cancer  Today Sharlene Simmons explains that he continues to work on increasing the variety of foods he is eating  He explains that he has had increased output recently, but he has also been eating more recently so he is not concerned about the increase in his output  He recently tried cooked spinach and cooked onions and tolerated them well  Encouraged him to continue his efforts to eat well and reintroduce foods back into his diet that he has not been eating since his colostomy  Reviewed 24 hour recall, which revealed an adequate po intake, and discussed ways to optimize nutrient intake  Additional discussion included: MNT for: colostomy, hyperglycemia , adequate hydration & fluid choices, eating smaller more frequent meals every 2-3 hours (5-6 small meals/day), continuing oral nutrition supplements and mindful eating concepts  Moving forward, [employer] to continue trials of new foods  Materials Provided: not applicable  All questions and concerns addressed during todays visit  Ivon Campbell has RD contact information      Nutrition Diagnosis:    Increased Nutrient Needs (kcal & pro) related to increased demand for nutrients and disease state as evidenced by cancer dx and pt undergoing tx for cancer   Altered GI Function related to tx for cancer as evidenced by s/p colostomy  Monitoring & Evaluation:   Goals:  · weight maintenance/stabilization  · adequate nutrition impact symptom management  · pt to meet >/=75% estimated nutrition needs daily  · adequate glycemic control    · Progress Towards Goals: Progressing    Nutrition Rx & Recommendations:  · Diet: High Calorie, High Protein (for high calorie foods see pages 52-53, and for high protein foods see pages 49-51 in your Eating Hints book)  · Keep a daily food journal (pen/paper, bossman such as [employer])  · Small, frequent meals/snacks may be easier to tolerate than 3 large daily meals  Aim for 5-6 small meals per day (every 2-3 hours)  · Include protein at all meals/snacks  · Include a variety of foods (as tolerated/allowed by diet)  · Stay hydrated by sipping fluids of choice/tolerance throughout the day  · Alter food choices and eating patterns to accommodate changing needs  · Weigh yourself regularly  If you notice weight loss, make an effort to increase your daily food/calorie intake  If you continue to notice loss after these efforts, reach out to your dietitian to establish a plan to stabilize weight  · Avoid gas-producing foods such as broccoli, cabbage, sauerkraut, [place] sprouts, cauliflower, corn, cucumbers, onions, peppers, beans, peas, lentils, apples, apple juice, avocado, and melons  Carbonated drinks, chewing gum, and drinking through a straw can also cause gas  Limiting lactose may help for those who are sensitive to milk products  · Spread carbohydrate intake throughout the day for glycemic control  · Continue Premier Protein daily  · For extra calories: add extra sauces/gravies, keep non perishable snacks nearby, choose liquids with calories, add extra cream cheese/cheese to foods as tolerated       Nutrition after colostomy placement:   After 6 weeks post surgery you can start adding foods that you have been avoiding and back into your diet   Add new foods slowly, one at a time, and chew them well   Food choices and their effects:    Foods that may cause diarrhea or increased output: fresh or raw fruit, fresh or raw vegetables, fried or spicy foods, high sugar beverages, legumes, milk and dairy foods, prune juice, sugar alcohols (sorbitol, mannitol, xylitol)   Foods that may help thicken stool: applesauce, bananas, cheese, marshmallows, oatmeal, creamy peanut butter, potatoes (no skin), tapioca pudding, white bread, pretzels, saltine crackers, white rice, white pasta/noodles, yogurt   Foods that may cause gas or odor: eggs, onions, peanuts, legumes, carbonated drinks, cruciferous vegetables (Brussel's sprouts, cauliflower, broccoli, cabbage), chewing gum   Foods that may help relieve gas and odor: buttermilk, cranberry juice, parsley, yogurt with active cultures   See "Diet and Nutrition After Colostomy" handout for additional suggestions on foods to choose and foods to avoid/limit after ostomy placement       Follow Up Plan: During infusion 6/1/22  Recommend Referral to Other Providers: none at this time

## 2022-05-20 ENCOUNTER — HOSPITAL ENCOUNTER (OUTPATIENT)
Dept: INFUSION CENTER | Facility: CLINIC | Age: 57
Discharge: HOME | End: 2022-05-20

## 2022-05-20 DIAGNOSIS — C18.4 MALIGNANT NEOPLASM OF TRANSVERSE COLON (HCC): Primary | ICD-10-CM

## 2022-05-20 NOTE — PROGRESS NOTES
Pt presents for elastomeric infusion device disconnection  Pt offers no complaints  Chemo ball appears to be empty and defalted  Port flushed and de accessed without difficulty  AVS declined, next appointment reviewed

## 2022-05-23 DIAGNOSIS — C18.4 MALIGNANT NEOPLASM OF TRANSVERSE COLON (HCC): Primary | ICD-10-CM

## 2022-05-23 RX ORDER — FLUOROURACIL 50 MG/ML
400 INJECTION, SOLUTION INTRAVENOUS ONCE
Status: CANCELLED | OUTPATIENT
Start: 2022-06-01

## 2022-05-23 RX ORDER — DEXTROSE MONOHYDRATE 50 MG/ML
20 INJECTION, SOLUTION INTRAVENOUS ONCE
Status: CANCELLED | OUTPATIENT
Start: 2022-06-29

## 2022-05-23 RX ORDER — FLUOROURACIL 50 MG/ML
400 INJECTION, SOLUTION INTRAVENOUS ONCE
Status: CANCELLED | OUTPATIENT
Start: 2022-06-15

## 2022-05-23 RX ORDER — FLUOROURACIL 50 MG/ML
400 INJECTION, SOLUTION INTRAVENOUS ONCE
Status: CANCELLED | OUTPATIENT
Start: 2022-06-29

## 2022-05-23 RX ORDER — SODIUM CHLORIDE 9 MG/ML
20 INJECTION, SOLUTION INTRAVENOUS ONCE
Status: CANCELLED | OUTPATIENT
Start: 2022-06-29

## 2022-05-23 RX ORDER — DEXTROSE MONOHYDRATE 50 MG/ML
20 INJECTION, SOLUTION INTRAVENOUS ONCE
Status: CANCELLED | OUTPATIENT
Start: 2022-06-14

## 2022-05-23 RX ORDER — SODIUM CHLORIDE 9 MG/ML
20 INJECTION, SOLUTION INTRAVENOUS ONCE
Status: CANCELLED | OUTPATIENT
Start: 2022-06-14

## 2022-05-24 ENCOUNTER — OFFICE VISIT (OUTPATIENT)
Dept: INTERNAL MEDICINE CLINIC | Facility: CLINIC | Age: 57
End: 2022-05-24
Payer: COMMERCIAL

## 2022-05-24 ENCOUNTER — TELEPHONE (OUTPATIENT)
Dept: SURGERY | Facility: CLINIC | Age: 57
End: 2022-05-24

## 2022-05-24 VITALS
TEMPERATURE: 97.4 F | HEIGHT: 68 IN | BODY MASS INDEX: 35.98 KG/M2 | DIASTOLIC BLOOD PRESSURE: 96 MMHG | OXYGEN SATURATION: 98 % | SYSTOLIC BLOOD PRESSURE: 144 MMHG | HEART RATE: 92 BPM | WEIGHT: 237.4 LBS | RESPIRATION RATE: 18 BRPM

## 2022-05-24 DIAGNOSIS — E78.2 MIXED HYPERLIPIDEMIA: ICD-10-CM

## 2022-05-24 DIAGNOSIS — E11.9 TYPE 2 DIABETES MELLITUS WITHOUT COMPLICATION, WITHOUT LONG-TERM CURRENT USE OF INSULIN (HCC): Primary | ICD-10-CM

## 2022-05-24 DIAGNOSIS — I10 BENIGN ESSENTIAL HYPERTENSION: ICD-10-CM

## 2022-05-24 DIAGNOSIS — C78.7 COLON CANCER METASTASIZED TO LIVER (HCC): ICD-10-CM

## 2022-05-24 DIAGNOSIS — C18.9 COLON CANCER METASTASIZED TO LIVER (HCC): ICD-10-CM

## 2022-05-24 DIAGNOSIS — L98.9 SKIN LESION: ICD-10-CM

## 2022-05-24 DIAGNOSIS — C18.4 MALIGNANT NEOPLASM OF TRANSVERSE COLON (HCC): Primary | ICD-10-CM

## 2022-05-24 DIAGNOSIS — E66.9 OBESITY (BMI 30-39.9): ICD-10-CM

## 2022-05-24 PROBLEM — E66.01 MORBID OBESITY (HCC): Status: RESOLVED | Noted: 2018-04-17 | Resolved: 2022-05-24

## 2022-05-24 PROBLEM — N18.31 STAGE 3A CHRONIC KIDNEY DISEASE (HCC): Status: RESOLVED | Noted: 2022-02-19 | Resolved: 2022-05-24

## 2022-05-24 PROBLEM — R65.10 SIRS (SYSTEMIC INFLAMMATORY RESPONSE SYNDROME) (HCC): Status: RESOLVED | Noted: 2022-03-12 | Resolved: 2022-05-24

## 2022-05-24 PROCEDURE — 99214 OFFICE O/P EST MOD 30 MIN: CPT | Performed by: INTERNAL MEDICINE

## 2022-05-24 PROCEDURE — 92250 FUNDUS PHOTOGRAPHY W/I&R: CPT | Performed by: INTERNAL MEDICINE

## 2022-05-24 PROCEDURE — 2025F 7 FLD RTA PHOTO W/O RTNOPTHY: CPT | Performed by: SURGERY

## 2022-05-24 RX ORDER — AMLODIPINE BESYLATE 10 MG/1
10 TABLET ORAL 2 TIMES DAILY
Qty: 90 TABLET | Refills: 3 | Status: SHIPPED | OUTPATIENT
Start: 2022-05-24 | End: 2022-08-09

## 2022-05-24 NOTE — TELEPHONE ENCOUNTER
Pt had colostomy loop 2/20/2022      called stating that when he is on his feet or doing chores around the house light work his ostoma swells up about 1 5 inches    Denies any bleeding or pain just occasional discomfort

## 2022-05-24 NOTE — PROGRESS NOTES
INTERNAL MEDICINE OFFICE VISIT  [facility] AT [address]  Tel: [phone]      NAME: Yee Cruz  AGE: 61 y o  SEX: male  : 1965   MRN: [id number]    DATE: 2022  TIME: 1:45 PM      Assessment and Plan:  1  Type 2 diabetes mellitus without complication, without long-term current use of insulin ([facility])    Continue present medications for now and get hemoglobin A1c done  His sugars are running high on the day of the chemotherapy as he gets sucrose in his chemotherapy regimen  He says he increases his insulin to 30 units on the day of the chemotherapy  - IRIS Diabetic eye exam    2  Benign essential hypertension  Continue medications, amlodipine was increased to 10 mg as his blood pressure was on the higher side  - amLODIPine (NORVASC) 10 mg tablet; Take 1 tablet (10 mg total) by mouth in the morning and 1 tablet (10 mg total) in the evening  Dispense: 90 tablet; Refill: 3    3  Mixed hyperlipidemia  Continue atorvastatin    4  Colon cancer metastasized to liver [facility])   follow-up with oncology    5  Obesity (BMI 30-39  9)  BMI Counseling: Body mass index is 36 11 kg/m²  The BMI is above normal  Nutrition recommendations include decreasing portion sizes, encouraging healthy choices of fruits and vegetables and moderation in carbohydrate intake  Exercise recommendations include moderate physical activity 150 minutes/week  Rationale for BMI follow-up plan is due to patient being overweight or obese  6  Skin lesion    - Ambulatory Referral to Dermatology; Future      - Counseling Documentation: patient was counseled regarding: diagnostic results, instructions for management, risk factor reductions, prognosis, patient and family education, risks and benefits of treatment options and importance of compliance with treatment  - Medication Side Effects:  Adverse side effects of medications were reviewed with the patient/guardian today  Return for follow up visit in  3 months or earlier, if needed  Chief Complaint:  Chief Complaint   Patient presents with    Follow-up     2 month w labs          History of Present Illness:    type 2 diabetes should be fairly controlled but his blood glucose levels have been rising and he has not had a hemoglobin A1c done recently   Blood pressure is also on the higher side despite taking medications   He has been continuing with the chemotherapy for the metastatic colon cancer and is almost nursing home  His CEA and CA 19 9 are high this time but he says that his oncologist is not concerned about it  He has follow-up CT scan of the abdomen coming up soon and is nervous about it  He has been depressed off and on but does not want to take any medication at present  Active Problem List:  Patient Active Problem List   Diagnosis    Type 2 diabetes mellitus without complication, without long-term current use of insulin (HCC)    Benign essential hypertension    Mixed hyperlipidemia    Erectile dysfunction    Low testosterone    Obesity (BMI 30-39  9)    Testicular hypogonadism    Incarcerated umbilical hernia    Left ureteral calculus    Type 2 diabetes mellitus with hyperlipidemia (HCC)    Colonic mass    Microcytic anemia    Hypokalemia    Transaminitis    Thrombocytosis    Iron deficiency anemia, unspecified    Malignant neoplasm of transverse colon (HCC)    Cancer of transverse colon ([facility] )    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 liver ([facility] )    Colon cancer metastasized to liver [facility])         Past Medical History:  Past Medical History:   Diagnosis Date    Abdominal pain 3/12/2022    Acute renal failure ([place] [address] )     [id number] resolved    Diabetes mellitus [facility] )     Enteritis 08/23/2016    Gastroparesis due to DM ([place] [address] ) 08/23/2016    GERD (gastroesophageal reflux disease)     Hernia, ventral 08/04/2016    Hyperlipidemia     Hypertension     Morbid obesity ([facility] ) 4/17/2018    Postoperative visit 3/2/2022    SIRS (systemic inflammatory response syndrome) ([place] [address] ) 3/12/2022    Stage 3a chronic kidney disease ([place] [address] ) 2/19/2022         Past Surgical History:  Past Surgical History:   Procedure Laterality Date    COLOSTOMY N/A 2/20/2022    Procedure: COLOSTOMY LOOP, diverting;  Surgeon: Martín Albarado MD;  Location: [facility];  Service: General    ESOPHAGOGASTRODUODENOSCOPY N/A 8/24/2016    Procedure: ESOPHAGOGASTRODUODENOSCOPY (EGD); Surgeon: Chavez Dunn MD;  Location: [facility];   Service:     IR PORT PLACEMENT  3/10/2022    KIDNEY STONE SURGERY      LIVER BIOPSY LAPAROSCOPIC N/A 2/20/2022    Procedure: DIAGNOSTIC LAPAROSCOPIC LIVER BIOPSY, DIVERTING LOOP COLOSTOMY ;  Surgeon: Martín Albarado MD;  [facility]: [facility];  Service: General    TONSILLECTOMY      UMBILICAL HERNIA REPAIR      UMBILICAL HERNIA REPAIR LAPAROSCOPIC N/A 4/26/2019    Procedure: LAPAROSCOPIC UMBILICAL HERNIA REPAIR;  Surgeon: Martín Albarado MD;  [facility];  Service: General         Family History:  Family History   Problem Relation Age of Onset    Diabetes Mother         mellitus    Lung cancer Mother     Coronary artery disease Father          Social History:  Social History     Socioeconomic History    Marital status: /Civil Union     Spouse name: None    Number of children: None    Years of education: None    Highest education level: None   Occupational History    Occupation: ACTOR     Employer: [employer]   Tobacco Use    Smoking status: Never Smoker    Smokeless tobacco: Never Used   Vaping Use    Vaping Use: Never used   Substance and Sexual Activity    Alcohol use: Not Currently     Comment: occasion    Drug use: No    Sexual activity: Yes     Partners: Female   Other Topics Concern    None   Social History Narrative    None     Social Determinants of Health     Financial Resource Strain: Not on file   Food Insecurity: No Food Insecurity    Worried About [facility] in the Last Year: Never true    [place] of Food in the Last Year: Never true   Transportation Needs: No Transportation Needs    Lack of Transportation (Medical): No    Lack of Transportation (Non-Medical): No   Physical Activity: Insufficiently Active    Days of Exercise per Week: 5 days    Minutes of Exercise per Session: 10 min   Stress: No Stress Concern Present    Feeling of Stress : Not at all   Social Connections: Not on file   Intimate Partner Violence: Not on file   Housing Stability: Low Risk     Unable to Pay for Housing in the Last Year: No    Number of Places Lived in the Last Year: 1    Unstable Housing in the Last Year: No         Allergies: Allergies   Allergen Reactions    Shellfish-Derived Products - Food Allergy Anaphylaxis    Erythromycin GI Intolerance     Pt denies         Medications:    Current Outpatient Medications:     amLODIPine (NORVASC) 10 mg tablet, Take 1 tablet (10 mg total) by mouth in the morning and 1 tablet (10 mg total) in the evening , Disp: 90 tablet, Rfl: 3    aspirin 81 MG tablet, Take 81 mg by mouth daily  , Disp: , Rfl:     atorvastatin (LIPITOR) 40 mg tablet, TAKE 1 TABLET BY MOUTH EVERY DAY, Disp: 90 tablet, Rfl: 3    Cholecalciferol (VITAMIN D3 PO), Take 400 Units by mouth daily, Disp: , Rfl:     Continuous Blood Gluc Sensor ([employer] 14 Day Sensor) MISC, Use 1 each every 14 (fourteen) days, Disp: 2 each, Rfl: 6    [START ON 6/1/2022] fluorouracil 5,255 mg in CADD/Elastomeric Infusion Device, Infuse 5,255 mg (1,200 mg/m2/day x 2 19 m2) into a venous catheter over 46 hours for 2 days  Do not start before June 1, 2022 , Disp: 1 each, Rfl: 0    glyBURIDE-metFORMIN (GLUCOVANCE) 5-500 MG per tablet, Take 1 tablet by mouth daily with breakfast Taking 1 tablet in the morning , Disp: , Rfl:     Insulin Pen Needle (B-D UF III MINI PEN NEEDLES) 31G X 5 MM MISC, Inject under the skin daily, Disp: 100 each, Rfl: 3   Januvia 100 MG tablet, TAKE 1 TABLET BY MOUTH EVERY DAY, Disp: 90 tablet, Rfl: 3    Lantus SoloStar 100 units/mL injection pen, INJECT 30 UNITS UNDER THE SKIN DAILY, Disp: 15 mL, Rfl: 1    lisinopril (ZESTRIL) 40 mg tablet, TAKE 1 TABLET BY MOUTH EVERY DAY, Disp: 90 tablet, Rfl: 3    metoclopramide (REGLAN) 10 mg tablet, Take 1 tablet (10 mg total) by mouth 2 (two) times a day, Disp: 180 tablet, Rfl: 0    Multiple Vitamins-Minerals (multivitamin with minerals) tablet, Take 1 tablet by mouth daily, Disp: , Rfl:     Needle, Disp, (HYPODERMIC NEEDLE) 23G X 1-1/2" MISC, by Does not apply route once a week, Disp: 10 each, Rfl: 6    ondansetron (ZOFRAN) 4 mg tablet, Take 4 mg by mouth every 6 (six) hours as needed, Disp: , Rfl:     Ostomy Supplies MISC, Use in the morning, Disp: 100 each, Rfl: 3    pantoprazole (PROTONIX) 40 mg tablet, TAKE 1 TABLET BY MOUTH TWICE A DAY, Disp: 180 tablet, Rfl: 3      The following portions of the patient's history were reviewed and updated as appropriate: past medical history, past surgical history, family history, social history, allergies, current medications and active problem list       Review of Systems:  Constitutional: Denies fever, chills, weight gain, weight loss, fatigue  Eyes: Denies eye redness, eye discharge, double vision, change in visual acuity  ENT: Denies hearing loss, tinnitus, sneezing, nasal congestion, nasal discharge, sore throat   Respiratory: Denies cough, expectoration, hemoptysis, shortness of breath, wheezing  Cardiovascular: Denies chest pain, palpitations, lower extremity swelling, orthopnea, PND  Gastrointestinal: Denies abdominal pain, heartburn, nausea, vomiting, hematemesis, diarrhea, bloody stools  Genito-Urinary: Denies dysuria, frequency, difficulty in micturition, nocturia, incontinence  Musculoskeletal: Denies back pain, joint pain, muscle pain  Neurologic: Denies confusion, lightheadedness, syncope, headache, focal weakness, sensory changes, seizures  Endocrine: Denies polyuria, polydipsia, temperature intolerance  Allergy and Immunology: Denies hives, insect bite sensitivity  Hematological and Lymphatic: Denies bleeding problems, swollen glands   Psychological: Denies depression, suicidal ideation, anxiety, panic, mood swings  Dermatological: Denies pruritus, rash, skin lesion changes      Vitals:  Vitals:    05/24/22 1317   BP: 144/96   Pulse: 92   Resp: 18   Temp: (!) 97 4 °F (36 3 °C)   SpO2: 98%       Body mass index is 36 11 kg/m²  Weight (last 2 days)     Date/Time Weight    05/24/22 1317 108 (237 4)            Physical Examination:  General: Patient is not in acute distress  Awake, alert, responding to commands  No weight gain or loss  Head: Normocephalic  Atraumatic  Eyes: Conjunctiva and lids with no swelling, erythema or discharge  Both pupils normal sized, round and reactive to light  Sclera nonicteric  ENT: External examination of nose and ear normal  Otoscopic examination shows translucent tympanic membranes with patent canals without erythema  Oropharynx moist with no erythema, edema, exudate or lesions  Neck: Supple  JVP not raised  Trachea midline  No masses  No thyromegaly  Lungs: No signs of increased work of breathing or respiratory distress  Bilateral bronchovascular breath sounds with no crackles or rhonchi  Chest wall: No tenderness  Cardiovascular: Normal PMI  No thrills  Regular rate and rhythm  S1 and S2 normal  No murmur, rub or gallop  Gastrointestinal: Abdomen soft, nontender  No guarding or rigidity  Liver and spleen not palpable  Bowel sounds present  Neurologic: Cranial nerves II-XII intact   Cortical functions normal  Motor system - Reflexes 2+ and symmetrical  Sensations normal  Musculoskeletal: Gait normal  No joint tenderness  Integumentary: Skin normal with no rash or lesions  Lymphatic: No palpable lymph nodes in neck, axilla or groin  Extremities: No clubbing, cyanosis, edema or varicosities  Psychological: Judgement and insight normal  Mood and affect normal    Patient's shoes and socks removed  Right Foot/Ankle   Right Foot Inspection  Skin Exam: skin normal and skin intact  No dry skin, no warmth, no callus, no erythema, no maceration, no abnormal color, no pre-ulcer, no ulcer and no callus  Toe Exam: ROM and strength within normal limits  Sensory   Proprioception: diminished and intact  Monofilament testing: intact    Vascular  Capillary refills: < 3 seconds  The right DP pulse is 2+  The right PT pulse is 2+  Left Foot/Ankle  Left Foot Inspection  Skin Exam: skin normal and skin intact  No dry skin, no warmth, no erythema, no maceration, normal color, no pre-ulcer, no ulcer and no callus  Toe Exam: ROM and strength within normal limits  Sensory   Proprioception: intact  Monofilament testing: intact    Vascular  Capillary refills: < 3 seconds  The left DP pulse is 2+  The left PT pulse is 2+       Assign Risk Category  No deformity present  No loss of protective sensation  No weak pulses  Risk: 0    Laboratory Results:  CBC with diff:   Lab Results   Component Value Date    WBC 7 7 05/13/2022    WBC 13 89 (H) 03/14/2022    WBC 10 8 07/21/2017    RBC 4 07 (L) 05/13/2022    RBC 3 07 (L) 03/14/2022    HGB 11 3 (L) 05/13/2022    HGB 7 4 (L) 03/14/2022    HGB 13 6 07/21/2017    HCT 34 4 (L) 05/13/2022    HCT 24 1 (L) 03/14/2022    HCT 39 3 07/21/2017    MCV 85 05/13/2022    MCV 69 (L) 03/14/2022    MCV 87 07/21/2017    MCH 27 8 05/13/2022    MCH 23 1 (L) 03/14/2022    MCH 30 2 07/21/2017    RDW 23 6 (H) 05/13/2022    RDW 17 9 (H) 03/14/2022    RDW 13 6 07/21/2017     05/13/2022     (H) 03/14/2022     07/21/2017       CMP:  Lab Results   Component Value Date    CREATININE 1 08 05/13/2022    CREATININE 1 15 03/14/2022    CREATININE 1 18 05/02/2017    BUN 17 05/13/2022     05/02/2017    K 4 1 05/13/2022     05/13/2022    CO2 22 05/13/2022    GLUCOSE 118 (H) 05/02/2017 PROT 6 2 04/10/2017    ALKPHOS 411 (H) 03/13/2022    ALKPHOS 94 04/10/2017    ALT 21 05/13/2022    AST 30 05/13/2022       Lab Results   Component Value Date    HGBA1C 7 5 (H) 02/10/2022       Lab Results   Component Value Date    TROPONINI <0 02 01/04/2020       Lipid Profile:   Lab Results   Component Value Date    CHOL 91 (L) 04/10/2017    CHOL 97 (L) 07/18/2016     Lab Results   Component Value Date    HDL 45 08/26/2021    HDL 34 (L) 04/10/2021     Lab Results   Component Value Date    LDLCALC 65 08/26/2021    LDLCALC 55 04/10/2021     Lab Results   Component Value Date    TRIG 75 08/26/2021    TRIG 90 04/10/2021       Imaging Results:  XR chest pa & lateral  Narrative: CHEST     INDICATION:   fever r/o pna  COMPARISON:  Chest CT 2/19/2022    EXAM PERFORMED/VIEWS:  XR CHEST PA & LATERAL  The frontal view was performed utilizing dual energy radiographic technique  Images: 5    FINDINGS:  Right chest wall port, tubing tip at the cavoatrial junction  Cardiomediastinal silhouette appears unremarkable  The lungs are clear  No pneumothorax or pleural effusion  Osseous structures appear within normal limits for patient age  Hiatal hernia  Impression: No acute cardiopulmonary disease  Workstation performed: [id number]  CT abdomen pelvis with contrast  Narrative: CT ABDOMEN AND PELVIS WITH IV CONTRAST    INDICATION:   recent colostomy, no output or gas since eating lunch several hours ago eval obstruction  COMPARISON:  CT abdomen/pelvis dated February 18, 2022  TECHNIQUE:  CT examination of the abdomen and pelvis was performed  In addition to portal venous phase postcontrast scanning through the abdomen and pelvis, delayed phase postcontrast scanning was performed through the upper abdominal viscera  Axial,   sagittal, and coronal 2D reformatted images were created from the source data and submitted for interpretation  Radiation dose length product (DLP) for this visit:  1875 mGy-cm   This examination, like all CT scans performed in the [facility], was performed utilizing techniques to minimize radiation dose exposure, including the use of iterative   reconstruction and automated exposure control  IV Contrast:  100 mL of iohexol (OMNIPAQUE)  Enteric Contrast:  Enteric contrast was not administered  FINDINGS:    ABDOMEN    LOWER CHEST:  No clinically significant abnormality identified in the visualized lower chest     LIVER/BILIARY TREE:  Again noted are innumerable hypodense lesions compatible with metastatic disease, similar to the prior exam   No intrahepatic biliary ductal dilatation  No portal vein thrombosis  GALLBLADDER:  There are gallstone(s) within the gallbladder, without pericholecystic inflammatory changes  SPLEEN:  The liver is enlarged measuring 14 6 cm in length  PANCREAS:  Unremarkable  ADRENAL GLANDS:  Unremarkable  KIDNEYS/URETERS:  One or more simple renal cyst(s) is noted  No hydronephrosis  One or more sharply circumscribed subcentimeter renal hypodensities are noted  These lesions are too small to accurately characterize, but are statistically most likely to represent benign cortical renal cyst(s)  According to the guidelines published in   the [facility] Paper of the ACR Incidental Findings Committee (Radiology 2010), no further workup of these lesions is recommended  STOMACH AND BOWEL:  Postoperative changes of recent right mid abdominal diverting colostomy are noted  There is no evidence of large or small bowel obstruction  Again noted is an apple core lesion at the distal transverse colon which measures   approximately 5 cm in length  APPENDIX:  No findings to suggest appendicitis  ABDOMINOPELVIC CAVITY:  There is a small amount of intra-abdominal and pelvic ascites  No pneumoperitoneum    Although there is no lymphadenopathy, there are a few nonenlarged lymph nodes adjacent to the patient's distal transverse colonic mass similar   to the prior exam and suspicious for metastatic disease  VESSELS:  Unremarkable for patient's age  PELVIS    REPRODUCTIVE ORGANS:  Unremarkable for patient's age  URINARY BLADDER:  Unremarkable  ABDOMINAL WALL/INGUINAL REGIONS:  Unremarkable  OSSEOUS STRUCTURES:  No acute fracture or destructive osseous lesion  Impression: Postoperative changes of recent right mid abdominal diverting colostomy  There is no evidence of obstruction  The small bowel loops are of normal caliber  Small amount of intra-abdominal and pelvic ascites  No free intraperitoneal air  Innumerable hepatic metastases similar to the prior exam     Reidentified apple core cortical lesion at the distal transverse colon      Workstation performed: [id number]       Health Maintenance:  Health Maintenance   Topic Date Due    Diabetic Foot Exam  04/02/2020    DM Eye Exam  04/02/2020    COVID-19 Vaccine (4 - Booster for Moderna series) 01/25/2022    BMI: Followup Plan  03/23/2022    HEMOGLOBIN A1C  08/10/2022    Annual Physical  03/07/2023 (Originally 7/28/1982)    Pneumococcal Vaccine: Pediatrics (0 to 5 Years) and At-Risk Patients (6 to 59 Years) (1 - PCV) 03/07/2023 (Originally 7/28/1970)    DTaP,Tdap,and Td Vaccines (1 - Tdap) 03/07/2023 (Originally 7/28/1985)    Depression Screening  03/07/2023    BMI: Adult  05/24/2023    HIV Screening  Completed    Hepatitis C Screening  Completed    Influenza Vaccine  Completed    HIB Vaccine  Aged Out    Hepatitis B Vaccine  Aged Out    IPV Vaccine  Aged Out    Hepatitis A Vaccine  Aged Out    Meningococcal ACWY Vaccine  Aged Out    HPV Vaccine  Aged Out     Immunization History   Administered Date(s) Administered    COVID-19 MODERNA VACC 0 5 ML IM 03/06/2021, 04/03/2021, 10/25/2021    Influenza, seasonal, injectable 10/12/2021         Jesus Jean MD  5/24/2022,1:45 PM

## 2022-05-25 ENCOUNTER — TELEPHONE (OUTPATIENT)
Dept: DERMATOLOGY | Facility: CLINIC | Age: 57
End: 2022-05-25

## 2022-05-25 NOTE — TELEPHONE ENCOUNTER
Morgan recd; Hi, my name is Lisette Claudio  My YOB: 1965  I was referred to the dermatologist by my primary care physician, Dr Andree Fleming  You can return my call at [phone]  I'd like to schedule an appointment  Thank you  Returned call   Detailed msg left informing him there is a current wait list of at least 2-3 months and to give us a call back if he would like to be added

## 2022-05-26 ENCOUNTER — HOSPITAL ENCOUNTER (OUTPATIENT)
Dept: INFUSION CENTER | Facility: CLINIC | Age: 57
Discharge: HOME | End: 2022-05-26
Payer: COMMERCIAL

## 2022-05-26 VITALS
SYSTOLIC BLOOD PRESSURE: 144 MMHG | HEART RATE: 83 BPM | DIASTOLIC BLOOD PRESSURE: 92 MMHG | TEMPERATURE: 97.7 F | RESPIRATION RATE: 18 BRPM

## 2022-05-26 DIAGNOSIS — D50.0 IRON DEFICIENCY ANEMIA DUE TO CHRONIC BLOOD LOSS: Primary | ICD-10-CM

## 2022-05-26 PROCEDURE — 96365 THER/PROPH/DIAG IV INF INIT: CPT

## 2022-05-26 RX ORDER — SODIUM CHLORIDE 9 MG/ML
20 INJECTION, SOLUTION INTRAVENOUS ONCE
Status: CANCELLED | OUTPATIENT
Start: 2022-06-01

## 2022-05-26 RX ORDER — SODIUM CHLORIDE 9 MG/ML
20 INJECTION, SOLUTION INTRAVENOUS ONCE
Status: COMPLETED | OUTPATIENT
Start: 2022-05-26 | End: 2022-05-26

## 2022-05-26 RX ADMIN — SODIUM CHLORIDE 20 ML/HR: 0.9 INJECTION, SOLUTION INTRAVENOUS at 09:24

## 2022-05-26 RX ADMIN — SODIUM CHLORIDE 300 MG: 0.9 INJECTION, SOLUTION INTRAVENOUS at 09:24

## 2022-05-26 NOTE — PROGRESS NOTES
PT here for venofer infusion, offering no complaints  Right port accessed with positive blood return noted throughout treatment  Tolerated infusion without incident  Port flushed and de-accessed  AVS declined  Walked out in stable condition

## 2022-05-27 ENCOUNTER — OFFICE VISIT (OUTPATIENT)
Dept: SURGERY | Facility: CLINIC | Age: 57
End: 2022-05-27
Payer: COMMERCIAL

## 2022-05-27 VITALS
BODY MASS INDEX: 37.2 KG/M2 | HEIGHT: 67 IN | SYSTOLIC BLOOD PRESSURE: 136 MMHG | DIASTOLIC BLOOD PRESSURE: 92 MMHG | WEIGHT: 237 LBS | HEART RATE: 93 BPM | OXYGEN SATURATION: 95 %

## 2022-05-27 DIAGNOSIS — K94.09 COLOSTOMY PROLAPSE (HCC): Primary | ICD-10-CM

## 2022-05-27 PROCEDURE — 99213 OFFICE O/P EST LOW 20 MIN: CPT | Performed by: SURGERY

## 2022-05-27 PROCEDURE — 3075F SYST BP GE 130 - 139MM HG: CPT | Performed by: SURGERY

## 2022-05-27 PROCEDURE — 3008F BODY MASS INDEX DOCD: CPT | Performed by: SURGERY

## 2022-05-27 NOTE — PROGRESS NOTES
Follow Up - General Surgery   Jose Alberto 61 y o  male MRN: [id number]  Unit/Bed#:  Encounter: [id number]    Assessment/Plan     Assessment:  History of diverting loop colostomy for obstructing carcinoma of the distal colon, stable  Prolapse of loop colostomy  History of stage IV colon cancer  History of hypertension  History of hyperlipidemia  History of stage 3 chronic kidney disease  History of GERD  History of diabetes  Plan:  Patient was concerned about prolapse of the loop colostomy, patient was reassured that this is normal, no intervention is needed  The patient will follow up in our office on a p r n  Basis  History of Present Illness     HPI:  Jose Alberto is a 61 y o  male who presents to my office for follow-up  The patient underwent laparoscopic diverting loop colostomy for obstructing colon cancer of the distal transverse colon from February 20th  The patient was concerned that the colostomy was protruding more than normal and swelling  He is able to push it back in when he lays down  He denies having any fever, chills, nausea, vomiting or any other constitutional symptoms  He is undergoing chemotherapy for colon cancer  Review of Systems: The rest of the review of system total of 10 were negative except for the HPI      Historical Information   Past Medical History:   Diagnosis Date    Abdominal pain 3/12/2022    Acute renal failure ([address] )     [id number] resolved    Diabetes mellitus ([address] )     Enteritis 08/23/2016    Gastroparesis due to DM ([address] ) 08/23/2016    GERD (gastroesophageal reflux disease)     Hernia, ventral 08/04/2016    Hyperlipidemia     Hypertension     Morbid obesity ([address] ) 4/17/2018    Postoperative visit 3/2/2022    SIRS (systemic inflammatory response syndrome) ([address] ) 3/12/2022    Stage 3a chronic kidney disease ([address] ) 2/19/2022     Past Surgical History:   Procedure Laterality Date    COLOSTOMY N/A 2/20/2022    Procedure: COLOSTOMY LOOP, diverting; Surgeon: Elizabeth Stapleton MD;  [facility];  Service: General    ESOPHAGOGASTRODUODENOSCOPY N/A 8/24/2016    Procedure: ESOPHAGOGASTRODUODENOSCOPY (EGD); Surgeon: Brown Luciano MD;  [facility]; Service:     IR PORT PLACEMENT  3/10/2022    KIDNEY STONE SURGERY      LIVER BIOPSY LAPAROSCOPIC N/A 2/20/2022    Procedure: DIAGNOSTIC LAPAROSCOPIC LIVER BIOPSY, DIVERTING LOOP COLOSTOMY ;  Surgeon: Elizabeth Stapleton MD;  [facility];  Service: General    TONSILLECTOMY      UMBILICAL HERNIA REPAIR      UMBILICAL HERNIA REPAIR LAPAROSCOPIC N/A 4/26/2019    Procedure: LAPAROSCOPIC UMBILICAL HERNIA REPAIR;  Surgeon: Elizabeth Stapleton MD;  [facility];  Service: General     Social History   Social History     Substance and Sexual Activity   Alcohol Use Not Currently    Comment: occasion     Social History     Substance and Sexual Activity   Drug Use No     Social History     Tobacco Use   Smoking Status Never Smoker   Smokeless Tobacco Never Used     Family History: non-contributory    Meds/Allergies   all medications and allergies reviewed     Current Outpatient Medications:     amLODIPine (NORVASC) 10 mg tablet, Take 1 tablet (10 mg total) by mouth in the morning and 1 tablet (10 mg total) in the evening , Disp: 90 tablet, Rfl: 3    aspirin 81 MG tablet, Take 81 mg by mouth daily  , Disp: , Rfl:     atorvastatin (LIPITOR) 40 mg tablet, TAKE 1 TABLET BY MOUTH EVERY DAY, Disp: 90 tablet, Rfl: 3    Cholecalciferol (VITAMIN D3 PO), Take 400 Units by mouth daily, Disp: , Rfl:     Continuous Blood Gluc Sensor (FreeStyle Parrish 14 Day Sensor) MISC, Use 1 each every 14 (fourteen) days, Disp: 2 each, Rfl: 6    [START ON 6/1/2022] fluorouracil 5,255 mg in CADD/Elastomeric Infusion Device, Infuse 5,255 mg (1,200 mg/m2/day x 2 19 m2) into a venous catheter over 46 hours for 2 days  Do not start before June 1, 2022 , Disp: 1 each, Rfl: 0    glyBURIDE-metFORMIN (GLUCOVANCE) 5-500 MG per tablet, Take 1 tablet by mouth daily with breakfast Taking 1 tablet in the morning , Disp: , Rfl:     Insulin Pen Needle (B-D UF III MINI PEN NEEDLES) 31G X 5 MM MISC, Inject under the skin daily, Disp: 100 each, Rfl: 3    Januvia 100 MG tablet, TAKE 1 TABLET BY MOUTH EVERY DAY, Disp: 90 tablet, Rfl: 3    Lantus SoloStar 100 units/mL injection pen, INJECT 30 UNITS UNDER THE SKIN DAILY, Disp: 15 mL, Rfl: 1    lisinopril (ZESTRIL) 40 mg tablet, TAKE 1 TABLET BY MOUTH EVERY DAY, Disp: 90 tablet, Rfl: 3    metoclopramide (REGLAN) 10 mg tablet, Take 1 tablet (10 mg total) by mouth 2 (two) times a day, Disp: 180 tablet, Rfl: 0    Multiple Vitamins-Minerals (multivitamin with minerals) tablet, Take 1 tablet by mouth daily, Disp: , Rfl:     Needle, Disp, (HYPODERMIC NEEDLE) 23G X 1-1/2" MISC, by Does not apply route once a week, Disp: 10 each, Rfl: 6    ondansetron (ZOFRAN) 4 mg tablet, Take 4 mg by mouth every 6 (six) hours as needed, Disp: , Rfl:     Ostomy Supplies MISC, Use in the morning, Disp: 100 each, Rfl: 3    pantoprazole (PROTONIX) 40 mg tablet, TAKE 1 TABLET BY MOUTH TWICE A DAY, Disp: 180 tablet, Rfl: 3  No current facility-administered medications for this visit  Allergies   Allergen Reactions    Shellfish-Derived Products - Food Allergy Anaphylaxis    Erythromycin GI Intolerance     Pt denies       Objective     Current Vitals:   Blood Pressure: 136/92 (05/27/22 0851)  Pulse: 93 (05/27/22 0851)  Height: 5' 7" (170 2 cm) (05/27/22 0851)  Weight - Scale: 108 kg (237 lb) (05/27/22 0851)  SpO2: 95 % (05/27/22 0851)      Invasive Devices  Report    Central Venous Catheter Line  Duration           Port A Cath 03/10/22 Right Chest 77 days          Drain  Duration           Colostomy Loop RUQ 95 days                Physical Exam  Vitals and nursing note reviewed  Constitutional:       General: He is not in acute distress  Cardiovascular:      Rate and Rhythm: Normal rate and regular rhythm     Pulmonary: Effort: No respiratory distress  Breath sounds: Normal breath sounds  Abdominal:      Comments: Abdomen is soft, nondistended and nontender  Colostomy is on the right upper quadrant, working well, there is some prolapse, easily reducible  Mucosa is viable  Skin:     General: Skin is warm  Coloration: Skin is not jaundiced  Findings: No erythema or rash  Neurological:      Mental Status: He is alert and oriented to person, place, and time  Cranial Nerves: No cranial nerve deficit     Psychiatric:         Mood and Affect: Mood normal          Behavior: Behavior normal

## 2022-05-28 LAB
ALBUMIN SERPL-MCNC: 3.6 G/DL (ref 3.8–4.9)
ALBUMIN/GLOB SERPL: 1.6 {RATIO} (ref 1.2–2.2)
ALP SERPL-CCNC: 258 IU/L (ref 44–121)
ALT SERPL-CCNC: 19 IU/L (ref 0–44)
APPEARANCE UR: CLEAR
AST SERPL-CCNC: 29 IU/L (ref 0–40)
BACTERIA URNS QL MICRO: NORMAL
BASOPHILS # BLD AUTO: 0.1 X10E3/UL (ref 0–0.2)
BASOPHILS NFR BLD AUTO: 1 %
BILIRUB SERPL-MCNC: 0.5 MG/DL (ref 0–1.2)
BILIRUB UR QL STRIP: NEGATIVE
BUN SERPL-MCNC: 18 MG/DL (ref 6–24)
BUN/CREAT SERPL: 14 (ref 9–20)
CALCIUM SERPL-MCNC: 8.5 MG/DL (ref 8.7–10.2)
CASTS URNS QL MICRO: NORMAL /LPF
CHLORIDE SERPL-SCNC: 104 MMOL/L (ref 96–106)
CO2 SERPL-SCNC: 20 MMOL/L (ref 20–29)
COLOR UR: YELLOW
CREAT SERPL-MCNC: 1.26 MG/DL (ref 0.76–1.27)
EGFR: 67 ML/MIN/1.73
EOSINOPHIL # BLD AUTO: 0.2 X10E3/UL (ref 0–0.4)
EOSINOPHIL NFR BLD AUTO: 3 %
EPI CELLS #/AREA URNS HPF: NORMAL /HPF (ref 0–10)
ERYTHROCYTE [DISTWIDTH] IN BLOOD BY AUTOMATED COUNT: 21.6 % (ref 11.6–15.4)
GLOBULIN SER-MCNC: 2.2 G/DL (ref 1.5–4.5)
GLUCOSE SERPL-MCNC: 180 MG/DL (ref 65–99)
GLUCOSE UR QL: ABNORMAL
HBA1C MFR BLD: 8.7 % (ref 4.8–5.6)
HCT VFR BLD AUTO: 35.8 % (ref 37.5–51)
HGB BLD-MCNC: 11.7 G/DL (ref 13–17.7)
HGB UR QL STRIP: ABNORMAL
IMM GRANULOCYTES # BLD: 0.1 X10E3/UL (ref 0–0.1)
IMM GRANULOCYTES NFR BLD: 1 %
KETONES UR QL STRIP: NEGATIVE
LEUKOCYTE ESTERASE UR QL STRIP: NEGATIVE
LYMPHOCYTES # BLD AUTO: 1.7 X10E3/UL (ref 0.7–3.1)
LYMPHOCYTES NFR BLD AUTO: 23 %
MCH RBC QN AUTO: 27.8 PG (ref 26.6–33)
MCHC RBC AUTO-ENTMCNC: 32.7 G/DL (ref 31.5–35.7)
MCV RBC AUTO: 85 FL (ref 79–97)
MICRO URNS: ABNORMAL
MONOCYTES # BLD AUTO: 0.8 X10E3/UL (ref 0.1–0.9)
MONOCYTES NFR BLD AUTO: 10 %
NEUTROPHILS # BLD AUTO: 4.7 X10E3/UL (ref 1.4–7)
NEUTROPHILS NFR BLD AUTO: 62 %
NITRITE UR QL STRIP: NEGATIVE
PH UR STRIP: 5.5 [PH] (ref 5–7.5)
PLATELET # BLD AUTO: 288 X10E3/UL (ref 150–450)
POTASSIUM SERPL-SCNC: 3.6 MMOL/L (ref 3.5–5.2)
PROT SERPL-MCNC: 5.8 G/DL (ref 6–8.5)
PROT UR QL STRIP: ABNORMAL
RBC # BLD AUTO: 4.21 X10E6/UL (ref 4.14–5.8)
RBC #/AREA URNS HPF: NORMAL /HPF (ref 0–2)
SODIUM SERPL-SCNC: 140 MMOL/L (ref 134–144)
SP GR UR: 1.02 (ref 1–1.03)
UROBILINOGEN UR STRIP-ACNC: 0.2 MG/DL (ref 0.2–1)
WBC # BLD AUTO: 7.5 X10E3/UL (ref 3.4–10.8)
WBC #/AREA URNS HPF: NORMAL /HPF (ref 0–5)

## 2022-05-28 PROCEDURE — 3052F HG A1C>EQUAL 8.0%<EQUAL 9.0%: CPT | Performed by: SURGERY

## 2022-06-01 ENCOUNTER — HOSPITAL ENCOUNTER (OUTPATIENT)
Dept: INFUSION CENTER | Facility: CLINIC | Age: 57
Discharge: HOME | End: 2022-06-01
Payer: COMMERCIAL

## 2022-06-01 ENCOUNTER — NUTRITION (OUTPATIENT)
Dept: NUTRITION | Facility: CLINIC | Age: 57
End: 2022-06-01

## 2022-06-01 VITALS
RESPIRATION RATE: 16 BRPM | HEIGHT: 67 IN | HEART RATE: 88 BPM | SYSTOLIC BLOOD PRESSURE: 134 MMHG | TEMPERATURE: 97.4 F | BODY MASS INDEX: 37.54 KG/M2 | WEIGHT: 239.2 LBS | DIASTOLIC BLOOD PRESSURE: 85 MMHG

## 2022-06-01 DIAGNOSIS — C18.4 MALIGNANT NEOPLASM OF TRANSVERSE COLON (HCC): Primary | ICD-10-CM

## 2022-06-01 DIAGNOSIS — Z71.3 NUTRITIONAL COUNSELING: Primary | ICD-10-CM

## 2022-06-01 DIAGNOSIS — D50.0 IRON DEFICIENCY ANEMIA DUE TO CHRONIC BLOOD LOSS: ICD-10-CM

## 2022-06-01 PROCEDURE — 96367 TX/PROPH/DG ADDL SEQ IV INF: CPT

## 2022-06-01 PROCEDURE — 96368 THER/DIAG CONCURRENT INF: CPT

## 2022-06-01 PROCEDURE — 96415 CHEMO IV INFUSION ADDL HR: CPT

## 2022-06-01 PROCEDURE — 96413 CHEMO IV INFUSION 1 HR: CPT

## 2022-06-01 PROCEDURE — G0498 CHEMO EXTEND IV INFUS W/PUMP: HCPCS

## 2022-06-01 PROCEDURE — 96411 CHEMO IV PUSH ADDL DRUG: CPT

## 2022-06-01 PROCEDURE — 96417 CHEMO IV INFUS EACH ADDL SEQ: CPT

## 2022-06-01 RX ORDER — FLUOROURACIL 50 MG/ML
400 INJECTION, SOLUTION INTRAVENOUS ONCE
Status: COMPLETED | OUTPATIENT
Start: 2022-06-01 | End: 2022-06-01

## 2022-06-01 RX ORDER — SODIUM CHLORIDE 9 MG/ML
20 INJECTION, SOLUTION INTRAVENOUS ONCE
Status: CANCELLED | OUTPATIENT
Start: 2022-06-08

## 2022-06-01 RX ORDER — DEXTROSE MONOHYDRATE 50 MG/ML
20 INJECTION, SOLUTION INTRAVENOUS ONCE
Status: COMPLETED | OUTPATIENT
Start: 2022-06-01 | End: 2022-06-01

## 2022-06-01 RX ORDER — SODIUM CHLORIDE 9 MG/ML
20 INJECTION, SOLUTION INTRAVENOUS ONCE
Status: COMPLETED | OUTPATIENT
Start: 2022-06-01 | End: 2022-06-01

## 2022-06-01 RX ADMIN — BEVACIZUMAB-AWWB 535 MG: 400 INJECTION, SOLUTION INTRAVENOUS at 13:23

## 2022-06-01 RX ADMIN — IRON SUCROSE 300 MG: 20 INJECTION, SOLUTION INTRAVENOUS at 11:03

## 2022-06-01 RX ADMIN — FLUOROURACIL 875 MG: 50 INJECTION, SOLUTION INTRAVENOUS at 16:14

## 2022-06-01 RX ADMIN — LEUCOVORIN CALCIUM 876 MG: 350 INJECTION, POWDER, LYOPHILIZED, FOR SOLUTION INTRAMUSCULAR; INTRAVENOUS at 14:07

## 2022-06-01 RX ADMIN — SODIUM CHLORIDE 20 ML/HR: 0.9 INJECTION, SOLUTION INTRAVENOUS at 11:03

## 2022-06-01 RX ADMIN — DEXTROSE 20 ML/HR: 5 SOLUTION INTRAVENOUS at 14:04

## 2022-06-01 RX ADMIN — DEXAMETHASONE SODIUM PHOSPHATE: 10 INJECTION, SOLUTION INTRAMUSCULAR; INTRAVENOUS at 12:46

## 2022-06-01 RX ADMIN — SODIUM CHLORIDE 20 ML/HR: 0.9 INJECTION, SOLUTION INTRAVENOUS at 11:50

## 2022-06-01 RX ADMIN — OXALIPLATIN 186.15 MG: 5 INJECTION, SOLUTION INTRAVENOUS at 14:04

## 2022-06-01 NOTE — PROGRESS NOTES
Outpatient Oncology Nutrition Consultation   Type of Consult: Follow Up  Care Location: Infusion Center    Reason for referral: From Infusion RN on 3/23/22 (reason for referral: Malnutrition Screening Tool (MST) Triggers: scored a 4 indicating >/=34# (>/=15 4 kg) recent wt loss and is eating poorly due to a decreased appetite  Date of MST: 3/23/22 )    Nutrition Assessment:   Oncology Diagnosis & Treatments: Diagnosed with colon cancer 3/1/22  · S/p colostomy 2/20/22  · Began chemotherapy (adrucil, oxaliplatin, mvasi) 3/23/22      Oncology History   Malignant neoplasm of transverse colon ([facility] )   3/1/2022 Initial Diagnosis    Malignant neoplasm of transverse colon ([facility] )     3/23/2022 -  Chemotherapy    fluorouracil (ADRUCIL), 400 mg/m2 = 905 mg, Intravenous, Once, 5 of 9 cycles  Administration: 905 mg (3/23/2022), 875 mg (4/6/2022), 875 mg (4/20/2022), 870 mg (5/4/2022), 880 mg (5/18/2022)  leucovorin calcium IVPB, 904 mg, Intravenous, Once, 5 of 9 cycles  Administration: 900 mg (3/23/2022), 876 mg (4/6/2022), 876 mg (4/20/2022), 868 mg (5/4/2022), 880 mg (5/18/2022)  oxaliplatin (ELOXATIN) chemo infusion, 85 mg/m2 = 192 1 mg, Intravenous, Once, 5 of 9 cycles  Administration: 192 1 mg (3/23/2022), 186 15 mg (4/6/2022), 186 15 mg (4/20/2022), 184 45 mg (5/4/2022), 187 mg (5/18/2022)  fluorouracil (ADRUCIL) ambulatory infusion Soln, 1,200 mg/m2/day = 5,425 mg, Intravenous, Over 46 hours, 5 of 9 cycles  bevacizumab-awwb (MVASI) IVPB, 5 mg/kg = 545 mg (100 % of original dose 5 mg/kg), Intravenous, Once, 5 of 9 cycles  Dose modification: 5 mg/kg (original dose 5 mg/kg, Cycle 1)  Administration: 545 mg (3/23/2022), 500 mg (4/6/2022), 500 mg (4/20/2022), 500 mg (5/4/2022), 540 mg (5/18/2022)       Past Medical & Surgical Hx: T2DM, gastroparesis, HLD, CKD, GERD  Patient Active Problem List   Diagnosis    Type 2 diabetes mellitus without complication, without long-term current use of insulin (HCC)    Benign essential hypertension    Mixed hyperlipidemia    Erectile dysfunction    Low testosterone    Obesity (BMI 30-39  9)    Testicular hypogonadism    Incarcerated umbilical hernia    Left ureteral calculus    Type 2 diabetes mellitus with hyperlipidemia (HCC)    Colonic mass    Microcytic anemia    Hypokalemia    Transaminitis    Thrombocytosis    Iron deficiency anemia, unspecified    Malignant neoplasm of transverse colon (HCC)    [facility]    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)    Colon cancer metastasized to liver ([facility] )    Colostomy prolapse ([place] [address] )     Past Medical History:   Diagnosis Date    Abdominal pain 3/12/2022    Acute renal failure ([place] [address] )     [id number] resolved    Diabetes mellitus ([place] [address] )     Enteritis 08/23/2016    Gastroparesis due to DM ([place] [address] ) 08/23/2016    GERD (gastroesophageal reflux disease)     Hernia, ventral 08/04/2016    Hyperlipidemia     Hypertension     Morbid obesity ([place] [address] ) 4/17/2018    Postoperative visit 3/2/2022    SIRS (systemic inflammatory response syndrome) ([place] [address] ) 3/12/2022    Stage 3a chronic kidney disease ([place] [address] ) 2/19/2022     Past Surgical History:   Procedure Laterality Date    COLOSTOMY N/A 2/20/2022    Procedure: COLOSTOMY LOOP, diverting;  Surgeon: Beth Leong MD;  [facility];  Service: General    ESOPHAGOGASTRODUODENOSCOPY N/A 8/24/2016    Procedure: ESOPHAGOGASTRODUODENOSCOPY (EGD); Surgeon: Jamie Joaquin MD;  [facility];   Service:     IR PORT PLACEMENT  3/10/2022    KIDNEY STONE SURGERY      LIVER BIOPSY LAPAROSCOPIC N/A 2/20/2022    Procedure: DIAGNOSTIC LAPAROSCOPIC LIVER BIOPSY, DIVERTING LOOP COLOSTOMY ;  Surgeon: Beth Leong MD;  [facility];  Service: General    TONSILLECTOMY      UMBILICAL HERNIA REPAIR      UMBILICAL HERNIA REPAIR LAPAROSCOPIC N/A 4/26/2019    Procedure: LAPAROSCOPIC UMBILICAL HERNIA REPAIR;  Surgeon: Beth Leong MD;  [facility];  Service: General       Review of Medications:   Vitamins, Supplements and Herbals: Med List Reviewed & pt is only taking: vitamin D 400IU; venofer with infusion today     Current Outpatient Medications:     amLODIPine (NORVASC) 10 mg tablet, Take 1 tablet (10 mg total) by mouth in the morning and 1 tablet (10 mg total) in the evening , Disp: 90 tablet, Rfl: 3    aspirin 81 MG tablet, Take 81 mg by mouth daily  , Disp: , Rfl:     atorvastatin (LIPITOR) 40 mg tablet, TAKE 1 TABLET BY MOUTH EVERY DAY, Disp: 90 tablet, Rfl: 3    Cholecalciferol (VITAMIN D3 PO), Take 400 Units by mouth daily, Disp: , Rfl:     Continuous Blood Gluc Sensor (FreeStyle Parrish 14 Day Sensor) MISC, Use 1 each every 14 (fourteen) days, Disp: 2 each, Rfl: 6    fluorouracil 5,255 mg in CADD/Elastomeric Infusion Device, Infuse 5,255 mg (1,200 mg/m2/day x 2 19 m2) into a venous catheter over 46 hours for 2 days  Do not start before June 1, 2022 , Disp: 1 each, Rfl: 0    glyBURIDE-metFORMIN (GLUCOVANCE) 5-500 MG per tablet, Take 1 tablet by mouth daily with breakfast Taking 1 tablet in the morning , Disp: , Rfl:     Insulin Pen Needle (B-D UF III MINI PEN NEEDLES) 31G X 5 MM MISC, Inject under the skin daily, Disp: 100 each, Rfl: 3    Januvia 100 MG tablet, TAKE 1 TABLET BY MOUTH EVERY DAY, Disp: 90 tablet, Rfl: 3    Lantus SoloStar 100 units/mL injection pen, INJECT 30 UNITS UNDER THE SKIN DAILY, Disp: 15 mL, Rfl: 1    lisinopril (ZESTRIL) 40 mg tablet, TAKE 1 TABLET BY MOUTH EVERY DAY, Disp: 90 tablet, Rfl: 3    metoclopramide (REGLAN) 10 mg tablet, Take 1 tablet (10 mg total) by mouth 2 (two) times a day, Disp: 180 tablet, Rfl: 0    Multiple Vitamins-Minerals (multivitamin with minerals) tablet, Take 1 tablet by mouth daily, Disp: , Rfl:     Needle, Disp, (HYPODERMIC NEEDLE) 23G X 1-1/2" MISC, by Does not apply route once a week, Disp: 10 each, Rfl: 6    ondansetron (ZOFRAN) 4 mg tablet, Take 4 mg by mouth every 6 (six) hours as needed, Disp: , Rfl:     Ostomy Supplies MISC, Use in the morning, Disp: 100 each, Rfl: 3    pantoprazole (PROTONIX) 40 mg tablet, TAKE 1 TABLET BY MOUTH TWICE A DAY, Disp: 180 tablet, Rfl: 3  No current facility-administered medications for this visit      Facility-Administered Medications Ordered in Other Visits:     iron sucrose (VENOFER) 300 mg in sodium chloride 0 9 % 250 mL IVPB, 300 mg, Intravenous, Once, Alisha Ledbetter MD, 300 mg at 06/01/22 1103    Most Recent Lab Results: Checks BG at home: 90-100mg/dL in the AM, ~200mg/dL in the evening   Lab Results   Component Value Date    WBC 7 5 05/27/2022    NEUTROABS 4 7 05/27/2022    IRON 26 (L) 02/20/2022    TIBC 235 (L) 02/20/2022    FERRITIN 125 02/20/2022    CHOLESTEROL 125 08/26/2021    CHOL 91 (L) 04/10/2017    TRIG 75 08/26/2021    HDL 45 08/26/2021    LDLCALC 65 08/26/2021    ALT 19 05/27/2022    AST 29 05/27/2022    ALB 3 6 (L) 05/27/2022     05/02/2017     04/10/2017    SODIUM 140 05/27/2022    SODIUM 138 05/13/2022    K 3 6 05/27/2022    K 4 1 05/13/2022     05/27/2022    BUN 18 05/27/2022    BUN 17 05/13/2022    CREATININE 1 26 05/27/2022    CREATININE 1 08 05/13/2022    EGFR 67 05/27/2022    TSH 0 719 08/26/2021    GLUCOSE 118 (H) 05/02/2017    POCGLU 309 (H) 03/14/2022    GLUF 246 (H) 03/14/2022    GLUC 180 (H) 05/27/2022    HGBA1C 8 7 (H) 05/27/2022    HGBA1C 7 5 (H) 02/10/2022    HGBA1C 8 0 (H) 08/26/2021    CALCIUM 7 9 (L) 03/14/2022       Anthropometric Measurements:   Height: 68"  Ht Readings from Last 1 Encounters:   06/01/22 5' 7" (1 702 m)     Wt Readings from Last 20 Encounters:   06/01/22 109 kg (239 lb 3 2 oz)   05/27/22 108 kg (237 lb)   05/24/22 108 kg (237 lb 6 4 oz)   05/18/22 107 kg (236 lb 9 6 oz)   05/06/22 108 kg (237 lb)   05/04/22 101 kg (223 lb 5 2 oz)   04/28/22 106 kg (232 lb 9 6 oz)   04/20/22 104 kg (229 lb 3 2 oz)   04/06/22 104 kg (228 lb 6 4 oz)   04/04/22 103 kg (227 lb)   03/23/22 105 kg (231 lb 11 3 oz) 03/18/22 105 kg (231 lb 3 2 oz)   03/17/22 105 kg (232 lb)   03/14/22 110 kg (241 lb 10 oz)   03/10/22 108 kg (239 lb)   03/07/22 109 kg (239 lb 6 4 oz)   03/02/22 108 kg (239 lb)   03/01/22 109 kg (240 lb)   02/18/22 133 kg (293 lb 3 4 oz)   03/23/21 133 kg (292 lb 9 6 oz)     Weight History:    Usual Weight: 290#   Varian: n/a   Home Scale: none     Oncology Nutrition-Anthropometrics    Flowsheet [facility] Nutrition from 6/1/2022 in [address] [place] [address] Nutrition from 5/18/2022 in [address] [place] [address]   Patient age (years): 61 years 61 years   Patient (male) height (in): 76 in 76 in   Current weight (lbs): 239 2 lbs 236 4 lbs   Current weight to be used for anthropometric calculations (kg) 108 7 kg 107 5 kg   BMI: 36 4 35 9   IBW male 154 lb 154 lb   IBW (kg) male 70 kg 70 kg   IBW % (male) 155 3 % 153 5 %   Adjusted BW (male): 175 3 lbs 174 6 lbs   Adjusted BW in kg (male): 79 7 kg 79 4 kg   % weight change after 1 week: 0 8 % --   Weight change after 1 week (lbs) 1 8 lbs --   % weight change after 1 month: 7 1 % 3 1 %   Weight change after 1 month (lbs) 15 9 lbs 7 2 lbs   % weight change after 3 months: -0 3 % -19 4 %   Weight change after 3 months (lbs) -0 8 lbs -56 8 lbs          Nutrition-Focused Physical Findings: none observed    Food/Nutrition-Related History & Client/Social History:    Current Nutrition Impact Symptoms:  [] Nausea  [] Reduced Appetite  [] Acid Reflux    [] Vomiting  [x] Unintended Wt Loss  [] Malabsorption    [] Diarrhea  [] Unintended Wt Gain  [] Dumping Syndrome    [] Constipation  [] Thick Mucous/Secretions  [] Abdominal Pain    [] Dysgeusia (Altered Taste)  [] Xerostomia (Dry Mouth)  [] Gas    [] Dysosmia (Altered Smell)  [] Thrush  [] Difficulty Chewing    [] Oral Mucositis (Sore Mouth)  [] Fatigue  [x] Hyperglycemia: hba1c 8 7% on 5/27/22; BG nonfasting 180mg/dL on 5/27/22; BG fasting 246mg/dL on 3/14/22      [] Odynophagia  [] Esophagitis  [] Other:    [] Dysphagia  [] Early Satiety  [x] No Problems Eating      Food Allergies & Intolerances: no    Current Diet: Avoiding cold temperatures while on Oxaliplatin and exercising caution with certain foods post colostomy  Current Nutrition Intake: Unchanged from last visit  Appetite: Good  Nutrition Route: PO  Oral Care: brushes BID  Activity level: ADL, feeling more energy         25 Hr Diet Recall:   Breakfast: corn flakes, yogurt OR eggs OR toast with extra cream cheese OR french toast OR donut   Lunch: tuna sandwich   Snack: tortilla chips, humus    Dinner: ground beef burrito OR red meat, chicken, or salmon with carrots/string beans/rice    Snack: Vanilla wafers     Beverages: water (12oz x1-3), ginger ale (8oz x1), Sprite Zero (16oz x1)    Supplements:    Premier Protein Shake (11 oz, 160 kcal, 30 g pro) 1x daily      Oncology Nutrition-Estimated Needs    Flowsheet Row Nutrition from 2022 in [address] [place] [address] Dietitian Flora Nutrition from 2022 in [place] [address] Oncology Dietitian Dwayne   Weight type used Actual weight Actual weight   Weight in kilograms (kg) used for estimated needs 108 7 kg --   Weight in kilograms (kg) used for estimated needs -- 107 5 kg   Energy needs formula:  30-35 kcal/kg 35-40 kcal/kg   Energy needs based on 30 kcal/k kcal --   Energy needs based on 35 kcal/k kcal --   Energy needs based on 35 kcal/kg: -- 3761 kcal   Energy needs based on 40 kcal/kg: -- 4298 kcal   Protein needs formula: 1 2-1 5 g/kg 1 5-2 g/kg   Protein needs based on 1 2 g/k g --   Protein needs based on 1 5 g/kg 163 g --   Protein needs based on 1 5 g/kg -- 161 g   Protein needs based on 2 g/kg -- 215 g   Fluid needs formula: 30-35 mL/kg 30-35 mL/kg   Fluid needs based on 30 mL/kg 3258 mL 3219 mL   Fluid needs in ounces 110 oz 109 oz   Fluid needs based on 35 mL/kg 3801 mL 3756 mL   Fluid needs in ounces 128 oz 127 oz           Discussion & Intervention:   Darryl Douglas was evaluated today for an RD follow up regarding wt loss and diet post colostomy  Matthew Duff is currently undergoing tx for colon cancer  Today Yetta Lank explains that he is doing well and is able to eat most foods now  He did try a few bites of salad and was not able to tolerate the lettuce well, he explains that the lettuce increased transit time of his stool through the colostomy  He also exercises caution with fast food/fried greasy foods  Reviewed 24 hour recall, which revealed an adequate po intake, and encouraged him to continue eating well to optimize nutrient intake  Additional discussion included: MNT for: colostomy, adequate hydration & fluid choices and mindful eating concepts  Matthew Duff has no other nutrition questions/concerns today  Moving forward, Matthew Duff will continue current nutrition plan of care   Materials Provided: not applicable  All questions and concerns addressed during todays visit  Matthew Duff has RD contact information  Nutrition Diagnosis:    Increased Nutrient Needs (kcal & pro) related to increased demand for nutrients and disease state as evidenced by cancer dx and pt undergoing tx for cancer   Altered GI Function related to tx for cancer as evidenced by s/p colostomy  Monitoring & Evaluation:   Goals:  · weight maintenance/stabilization  · adequate nutrition impact symptom management  · pt to meet >/=75% estimated nutrition needs daily  · adequate glycemic control    · Progress Towards Goals: Progressing    Nutrition Rx & Recommendations:  · Diet: High Calorie, High Protein (for high calorie foods see pages 52-53, and for high protein foods see pages 49-51 in your Eating Hints book)  · Keep a daily food journal (pen/paper, bossman such as Gradient X)  · Small, frequent meals/snacks may be easier to tolerate than 3 large daily meals  Aim for 5-6 small meals per day (every 2-3 hours)  · Include protein at all meals/snacks    · Include a variety of foods (as tolerated/allowed by diet)   · Stay hydrated by sipping fluids of choice/tolerance throughout the day  · Alter food choices and eating patterns to accommodate changing needs  · Weigh yourself regularly  If you notice weight loss, make an effort to increase your daily food/calorie intake  If you continue to notice loss after these efforts, reach out to your dietitian to establish a plan to stabilize weight  · Avoid gas-producing foods such as broccoli, cabbage, sauerkraut, [place] sprouts, cauliflower, corn, cucumbers, onions, peppers, beans, peas, lentils, apples, apple juice, avocado, and melons  Carbonated drinks, chewing gum, and drinking through a straw can also cause gas  Limiting lactose may help for those who are sensitive to milk products  · Spread carbohydrate intake throughout the day for glycemic control  · Continue Premier Protein daily  · For extra calories: add extra sauces/gravies, keep non perishable snacks nearby, choose liquids with calories, add extra cream cheese/cheese to foods as tolerated   Food choices and their effects on colostomy:    Foods that may cause diarrhea or increased output: fresh or raw fruit, fresh or raw vegetables, fried or spicy foods, high sugar beverages, legumes, milk and dairy foods, prune juice, sugar alcohols (sorbitol, mannitol, xylitol)   Foods that may help thicken stool: applesauce, bananas, cheese, marshmallows, oatmeal, creamy peanut butter, potatoes (no skin), tapioca pudding, white bread, pretzels, saltine crackers, white rice, white pasta/noodles, yogurt   Foods that may cause gas or odor: eggs, onions, peanuts, legumes, carbonated drinks, cruciferous vegetables (Brussel's sprouts, cauliflower, broccoli, cabbage), chewing gum   Foods that may help relieve gas and odor: buttermilk, cranberry juice, parsley, yogurt with active cultures        See "Diet and Nutrition After Colostomy" handout for additional suggestions on foods to choose and foods to avoid/limit after ostomy placement       Follow Up Plan: During infusion 7/13/22  Recommend Referral to Other Providers: none at this time

## 2022-06-01 NOTE — PROGRESS NOTES
Pt here for chemotherapy, offering no complaints  Right port accessed with positive blood return noted throughout treatment  Tolerated infusion without incident  Port flushed and Chemo ball attached to port, double Checked by chelle macdonald and Rohan Boateng, RNs,  AVS declined    Walked out in stable condition

## 2022-06-02 DIAGNOSIS — C18.9 COLON CANCER METASTASIZED TO LIVER (HCC): ICD-10-CM

## 2022-06-02 DIAGNOSIS — C78.7 COLON CANCER METASTASIZED TO LIVER (HCC): ICD-10-CM

## 2022-06-02 DIAGNOSIS — C18.4 MALIGNANT NEOPLASM OF TRANSVERSE COLON (HCC): Primary | ICD-10-CM

## 2022-06-03 ENCOUNTER — HOSPITAL ENCOUNTER (OUTPATIENT)
Dept: INFUSION CENTER | Facility: CLINIC | Age: 57
Discharge: HOME | End: 2022-06-03

## 2022-06-03 DIAGNOSIS — C18.4 MALIGNANT NEOPLASM OF TRANSVERSE COLON (HCC): Primary | ICD-10-CM

## 2022-06-03 NOTE — PROGRESS NOTES
Patient here for pump disconnect and is doing very well, no c/o or changes, no concerns reported  He tolerated pump disconnect without incident and was discharged  Next appt 6/8/22 for venofer

## 2022-06-03 NOTE — PATIENT INSTRUCTIONS
June 2022 Sunday Monday Tuesday Wednesday Thursday Friday Saturday                  1    ONCNUT FOLLOW UP PG  10:00 AM   (60 min )   Adrian Ruiz RD   [facility] Oncology Dietitian [place]    INF ONCOLOGY TX-TREATMENT PLAN  10:00 AM   (390 min )   [facility] INF CHAIR [address] 2     3    INF ONCOLOGY TX-TREATMENT PLAN   2:30 PM   (30 min )   [facility] INF QUICK CHAIR [address] 4          Cycle 6, Day 1  + Add'l treatments      5     6     7    CT CHEST ABDOMEN PELVIS W CON   3:30 PM   (30 min )   MO CT 1   [facility] CT Scan 8    INF THERAPY PLAN  12:00 PM   (150 min )   MO INF CHAIR [address] 9     10     11             Add'l treatments     12     13     14    INF ONCOLOGY TX-TREATMENT PLAN   8:30 AM   (300 min )   [facility] INF CHAIR [address] 15    FOLLOW UP PG  10:25 AM   (40 min )   Abena Rosa MD   [facility] [place] [address]    INF ONCOLOGY TX-TREATMENT PLAN  12:30 PM   (30 min )   MO INF QUICK CHAIR 1   [address] 17     18          Cycle 7, Day 1      19     20     21     22     23     24     25                26     27     28     29    INF ONCOLOGY TX-TREATMENT PLAN   8:30 AM   (300 min )   MO INF CHAIR [address] 30                    Cycle 8, Day 1             Treatment Details         6/1/2022 - Cycle 6, Day 1 + Additional treatments      Chemotherapy: ONCBCN PROVIDER COMMUNICATION8, BEVACIZUMAB-AWWB IVPB, OXALIPLATIN INFUSION, LEUCOVORIN IVPB, fluorouracil (ADRUCIL)      Take-Home Chemo: ONCBCN PROVIDER COMMUNICATION8, FLUOROURACIL AMBULATORY INFUSION      Supportive Care: APPT 17, ONCBCN NURSE [id number], sodium chloride, iron sucrose (VENOFER), MONITOR CRIS, ONCBCN NURSE COMMUNICATION7    6/9/2022 - Additional treatments Supportive Care: APPT 17    6/15/2022 - Cycle 7, Day 1      Chemotherapy: ONCBCN PROVIDER COMMUNICATION8, BEVACIZUMAB-AWWB IVPB, OXALIPLATIN INFUSION, LEUCOVORIN IVPB, fluorouracil (ADRUCIL)      Take-Home Chemo: ONCBCN PROVIDER COMMUNICATION8, FLUOROURACIL AMBULATORY INFUSION    6/29/2022 - Cycle 8, Day 1      Chemotherapy: ONCBCN PROVIDER COMMUNICATION8, BEVACIZUMAB-AWWB IVPB, OXALIPLATIN INFUSION, LEUCOVORIN IVPB, fluorouracil (ADRUCIL)      Take-Home Chemo: ONCBCN PROVIDER COMMUNICATION8, FLUOROURACIL AMBULATORY INFUSION

## 2022-06-05 PROBLEM — C18.4 CANCER OF TRANSVERSE COLON (HCC): Status: RESOLVED | Noted: 2022-03-02 | Resolved: 2022-06-05

## 2022-06-05 NOTE — PROGRESS NOTES
Hematology/Oncology Progress Note    Date of Service: 6/5/2022    [facility] HEMATOLOGY [facility]Y SPECIALISTS  [id number] [facility] [address] [id number] [phone]    Hem/Onc Problem List:     Metastatic colon cancer  Chief Complaint:   Routine follow-up for management of colon cancer on chemotherapy    Assessment/Plan:     1  Metastatic colon cancer with extensive liver metastasis, MMR expression intact  Status post colostomy  Palliative chemotherapy mFOLFOX6 with bevacizumab without Neulasta started on March 23, 2022  C8 coming up  He is tolerating it well  He is established with Dr Felicia Oakes of palliative care  Patient will continue current management  Restaging CT CAP ordered for 3 months (ordered today)  Patient will have a CBC CMP before each cycle of therapy and trend tumor markers CA 19-9, CEA q 3 months (next ordered for 8/10/2022)     2  Macrocytic hypochromic anemia likely due to GI bleeding  Improved  3  Elevated alkaline phosphatase and transaminase due to liver metastasis  They are trending down  Improving on treatment  Follow-up:  Return to clinic q 2 cycles due to being on systemic chemotherapy       Discussion of decision making     I personally reviewed the following lab results, the image studies, pathology, other specialty/physicians consult notes and recommendations, and outside medical records from [facility]  I had a lengthy discussion with the patient and shared the work-up findings  We discussed the diagnosis and management plan as below  I spent 31 minutes reviewing the records (labs, clinician notes, outside records, medical history, ordering medicine/tests/procedures, interpreting the imaging/labs previously done) and coordination of care as well as direct time with the patient today, of which greater than 50% of the time was spent in counseling and coordination of care with the patient/family         · Plan/Labs  ?  Continue mFOLFOX6 with bevacizumab, additional infusion chairs to be ordered for the next 3 months with preceding labs  ? Restaging CT CAP ordered for 9/2022  ? MRI Abd/pelv scheduled 7/8/2022  ? Consider tumor board if further sustained and improved reduction of index colon mass and liver discoid hepatic dome masses         Follow Up:  2 months    AJCC 8th Edition Cancer Stage :    Cancer Staging  IV    Hematology/Oncology History:     2/18/2022 CT Abd/pelv w/c: Distal transverse colonic apple core lesion causing a degree of obstruction with dilated upstream colon with mild surrounding fat stranding  Innumerable hepatic metastases  Nonenlarged mesenteric lymph nodes adjacent to the colonic mass, concerning for metastatic disease, given their location  · February 19, 2022, Chest wo contrast:  6 mm right upper lobe lung nodule  No definite metastatic disease in the chest   · February 20, 2022, CT-guided biopsy of the liver lesion consistent with colorectal primary  Final Diagnosis   A  Liver, biopsy:  - Adenocarcinoma, consistent with colorectal primary      Comment: Immunohistochemistry was performed on block A1  The tumor cells are positive for CK20, SATB2, CDX2,  and negative for TTF-1, NKX3 1, Ck7; DPC-4 shows no loss, supporting the above diagnosis  Dr Mariia Del Toro is notified of the diagnosis in [email or web address] via [employer] on 2/24/2022  at 11:36 am    Immunohistochemistry [facility]) testing for Mismatch Repair (MMR) proteins has been requested on block A1, and the results will be issued in an addendum         · 2/20/2022  MMR expression intact   2, CA 19-9 1848  Alkaline phosphatase elevated  · 3/12/2022: CT abdomen pelvis with contrast:  Postoperative changes of recent right mid abdominal diverting colostomy  No evidence of traction  Innumerable hepatic metastasis similar to the prior exam   · 3/23/2022: cycle 1 day 1 chemotherapy with modified FOLFOX 6 and Avastin    · 3/2022 - 4/2022: 4 sessions IV Venofer 300mg  · 5/13/2022: CA 19-9 1247, CEA 1473  · 5/18 - 6/8/2022: 4 sessions IV Venofer 300mg  · 6/7/2022 CT CAP w/o c: Unchanged or smaller pulmonary nodules  The dominant 6 mm right upper lobe nodule seen on the prior study now measures 4 mm  Advanced diffuse hepatic metastatic disease is likely slightly improved from the prior study ; Discrete 5 0 and 3 7 cm hepatic dome masses, #2/50 on today's study had conglomerate appearance on the prior study  Less evident distal transverse colonic mass  History of Present Illiness:   Elizabeth Cartwright is a 61 y o  male with the above-noted HemOnc history who is here for management of colon cancer on chemotherapy  Patient has past medical history of diabetes mellitus, hypertension and hyperlipidemia, GERD who was admitted hospital on February 18, 2022 because of abdominal pain  CT scan showed transverse colonic able core lesion measuring 3 7 cm in length with obstruction  Innumerable hepatic metastasis with largest one measuring 7 2 x 6 2 cm  Nonenlarged mesenteric lymph nodes adjacent to the colonic mass concerning for metastatic disease as well  CT chest showed noncalcified subcentimeter nodule in the right upper lobe,  which was indeterminate   2, CA 19-9 1848  Alkaline phosphatase elevated  Patient then underwent diagnostic laparoscopy with diverting loop transverse colostomy secondary to near obstructive apple core lesion of distal transverse colon and liver biopsy on February 20, 2020  Pathology consistent with metastatic colon cancer  MMR are normally expressed  Lab showed microcytic hypochromic anemia and leukocytosis  Iron studies showed iron deficiency  Patient started mFOLFOX6 with Avastin on March 23, 2022  Patient had 4 cycles treatment  Patient tolerated treatment fairly well except fatigue  Interval events:  No N/V on chemo   He does have mild numbness in his feet and toes that does not affect his IADLs (worsened after therapy and retains 10% of it the week after chemo)  No fever or chills  No exertional chest pain, diaphoresis or shortness  of breath  No cough and phlegm, no hemoptysis  No abdominal pain  No diarrhea or constipation  No  symptoms  No headache or blurred vision  Appetite good  Patient has been continuing to gain weight  The patient denies bleeding anywhere or through colostomy bag  Baseline weight over the years: 270 lbs    ROS: A 10-point of review of systems is obtained and negative unless otherwise stated above     Objective:   VITALS:   There were no vitals taken for this visit  Physical EXAM:  General:  Alert, cooperative, no distress  Head:  Normocephalic, without obvious abnormality, atraumatic  Eyes:  Conjunctivae/corneas clear  EOMs intact  No evidence of conjunctivitis     Throat: Lips, mucosa, and tongue normal   No oral thrush  Neck: Supple, symmetrical, trachea midline, no adenopathy    Lungs:   Clear to auscultation bilaterally  Respiratory effort easy, nonlabored    Heart:  Regular rate and rhythm, + S1, S2     Abdomen:   Soft, non-tender,nondistended  Bowel sounds normal  Colostomy bag in place  Extremities:   Lymph nodes: Extremities normal, no cyanosis or edema  No axillary or inguinal adenopathy   Skin: Skin color, texture normal  No rashes    Neurologic: AAO  No focal neuro deficits noted b/l      Plays [employer] in the [facility]   He is in [address] [place] [address] Allergy Anaphylaxis    Erythromycin GI Intolerance     Pt denies       Past Medical History:   Diagnosis Date    Abdominal pain 3/12/2022    Acute renal failure ([facility] )     [id number] resolved    Diabetes mellitus ([facility] )     Enteritis 08/23/2016    Gastroparesis due to DM ([facility] ) 08/23/2016    GERD (gastroesophageal reflux disease)     Hernia, ventral 08/04/2016    Hyperlipidemia     Hypertension     Morbid obesity ([facility] ) 4/17/2018    Postoperative visit 3/2/2022    SIRS (systemic inflammatory response syndrome) ([facility] [address] ) 3/12/2022    Stage 3a chronic kidney disease ([facility] [address] ) 2/19/2022       Past Surgical History:   Procedure Laterality Date    COLOSTOMY N/A 2/20/2022    Procedure: COLOSTOMY LOOP, diverting;  Surgeon: Irais Davenport MD;  Location: [facility] MAIN OR;  Service: General    ESOPHAGOGASTRODUODENOSCOPY N/A 8/24/2016    Procedure: ESOPHAGOGASTRODUODENOSCOPY (EGD); Surgeon: Annabelle Aguilar MD;  Location: [facility] GI LAB;   Service:     IR PORT PLACEMENT  3/10/2022    KIDNEY STONE SURGERY      LIVER BIOPSY LAPAROSCOPIC N/A 2/20/2022    Procedure: DIAGNOSTIC LAPAROSCOPIC LIVER BIOPSY, DIVERTING LOOP COLOSTOMY ;  Surgeon: Irais Davenport MD;  Location: [facility] MA[facility] OR;  Service: General    TONSILLECTOMY      UMBILICAL HERNIA REPAIR      UMBILICAL HERNIA REPAIR LAPAROSCOPIC N/A 4/26/2019    Procedure: LAPAROSCOPIC UMBILICAL HERNIA REPAIR;  Surgeon: Irais Davenport MD;  Location: [facility];  Service: General       Family History   Problem Relation Age of Onset    Diabetes Mother         mellitus    Lung cancer Mother     Coronary artery disease Father        Social History     Socioeconomic History    Marital status: /Civil Union     Spouse name: Not on file    Number of children: Not on file    Years of education: Not on file    Highest education level: Not on file   Occupational History    Occupation: ACTOR     Employer: [employer]   Tobacco Use    Smoking status: Never Smoker    Smokeless tobacco: Never Used   Vaping Use    Vaping Use: Never used   Substance and Sexual Activity    Alcohol use: Not Currently     Comment: occasion    Drug use: No    Sexual activity: Yes     Partners: Female   Other Topics Concern    Not on file   Social History Narrative    Not on file     Social Determinants of Health     Financial Resource Strain: Not on file   Food Insecurity: No Food Insecurity    Worried About [address] in the Last Year: Never true    [place] of Food in the Last Year: Never true   Transportation Needs: No Transportation Needs    Lack of Transportation (Medical): No    Lack of Transportation (Non-Medical): No   Physical Activity: Insufficiently Active    Days of Exercise per Week: 5 days    Minutes of Exercise per Session: 10 min   Stress: No Stress Concern Present    Feeling of Stress : Not at all   Social Connections: Not on file   Intimate Partner Violence: Not on file   Housing Stability: Low Risk     Unable to Pay for Housing in the Last Year: No    Number of Places Lived in the Last Year: 1    Unstable Housing in the Last Year: No       Current Outpatient Medications   Medication Sig Dispense Refill    amLODIPine (NORVASC) 10 mg tablet Take 1 tablet (10 mg total) by mouth in the morning and 1 tablet (10 mg total) in the evening  90 tablet 3    aspirin 81 MG tablet Take 81 mg by mouth daily        atorvastatin (LIPITOR) 40 mg tablet TAKE 1 TABLET BY MOUTH EVERY DAY 90 tablet 3    Cholecalciferol (VITAMIN D3 PO) Take 400 Units by mouth daily      Continuous Blood Gluc Sensor ([employer] Parrish 14 Day Sensor) MISC Use 1 each every 14 (fourteen) days 2 each 6    glyBURIDE-metFORMIN (GLUCOVANCE) 5-500 MG per tablet Take 1 tablet by mouth daily with breakfast Taking 1 tablet in the morning       Insulin Pen Needle (B-D UF III MINI PEN NEEDLES) 31G X 5 MM MISC Inject under the skin daily 100 each 3    Januvia 100 MG tablet TAKE 1 TABLET BY MOUTH EVERY DAY 90 tablet 3    Lantus SoloStar 100 units/mL injection pen INJECT 30 UNITS UNDER THE SKIN DAILY 15 mL 1    lisinopril (ZESTRIL) 40 mg tablet TAKE 1 TABLET BY MOUTH EVERY DAY 90 tablet 3    metoclopramide (REGLAN) 10 mg tablet Take 1 tablet (10 mg total) by mouth 2 (two) times a day 180 tablet 0    Multiple Vitamins-Minerals (multivitamin with minerals) tablet Take 1 tablet by mouth daily      Needle, Disp, (HYPODERMIC NEEDLE) 23G X 1-1/2" MISC by Does not apply route once a week 10 each 6    ondansetron (ZOFRAN) 4 mg tablet Take 4 mg by mouth every 6 (six) hours as needed      Ostomy Supplies MISC Use in the morning 100 each 3    pantoprazole (PROTONIX) 40 mg tablet TAKE 1 TABLET BY MOUTH TWICE A  tablet 3     No current facility-administered medications for this visit  DATA REVIEW:    Pathology Result:    Final Diagnosis   Date Value Ref Range Status   02/20/2022   Final    A  Liver, biopsy:  - Adenocarcinoma, consistent with colorectal primary  Comment: Immunohistochemistry was performed on block A1  The tumor cells are positive for CK20, SATB2, CDX2,  and negative for TTF-1, NKX3 1, Ck7; DPC-4 shows no loss, supporting the above diagnosis  Dr Chinyere Buitrago is notified of the diagnosis in [employer] via [employer] on 2/24/2022  at 11:36 am    Immunohistochemistry [facility]) testing for Mismatch Repair (MMR) proteins has been requested on block A1, and the results will be issued in an addendum  08/24/2016   Final    A  Small bowel (biopsy):  - Duodenal mucosa with no diagnostic abnormality  - No Marsh lesion  - Negative for dysplasia     B  Stomach, body (biopsy):  - Chronic inactive oxyntic gastritis  - No H pylori identified on immunohistochemistry stain  - No intestinal metaplasia or dysplasia    C  Distal esophagus (biopsy):  - Cardiac-type mucosa with intestinal metaplasia   - Negative for dysplasia     Comment: This biopsy shows gastric-type mucosa with scattered goblet cells  The diagnosis in this case depends on the location of this biopsy  If this biopsy was taken from the tubular esophagus, it represents Esparza mucosa of the distinctive type  If this biopsy was taken from the gastric cardia, it represents intestinal metaplasia of the gastric cardia  Interpretation performed at [facility], [address] [place] [id number]            Image Results:   They are reviewed and documented in Hematology/Oncology history    XR chest pa & lateral  Narrative: CHEST     INDICATION:   fever r/o pna  COMPARISON:  Chest CT 2/19/2022    EXAM PERFORMED/VIEWS:  XR CHEST PA & LATERAL  The frontal view was performed utilizing dual energy radiographic technique  Images: 5    FINDINGS:  Right chest wall port, tubing tip at the cavoatrial junction  Cardiomediastinal silhouette appears unremarkable  The lungs are clear  No pneumothorax or pleural effusion  Osseous structures appear within normal limits for patient age  Hiatal hernia  Impression: No acute cardiopulmonary disease  Workstation performed: [id number]  CT abdomen pelvis with contrast  Narrative: CT ABDOMEN AND PELVIS WITH IV CONTRAST    INDICATION:   recent colostomy, no output or gas since eating lunch several hours ago eval obstruction  COMPARISON:  CT abdomen/pelvis dated February 18, 2022  TECHNIQUE:  CT examination of the abdomen and pelvis was performed  In addition to portal venous phase postcontrast scanning through the abdomen and pelvis, delayed phase postcontrast scanning was performed through the upper abdominal viscera  Axial,   sagittal, and coronal 2D reformatted images were created from the source data and submitted for interpretation  Radiation dose length product (DLP) for this visit:  1875 mGy-cm   This examination, like all CT scans performed in the [facility], was performed utilizing techniques to minimize radiation dose exposure, including the use of iterative   reconstruction and automated exposure control  IV Contrast:  100 mL of iohexol (OMNIPAQUE)  Enteric Contrast:  Enteric contrast was not administered  FINDINGS:    ABDOMEN    LOWER CHEST:  No clinically significant abnormality identified in the visualized lower chest     LIVER/BILIARY TREE:  Again noted are innumerable hypodense lesions compatible with metastatic disease, similar to the prior exam   No intrahepatic biliary ductal dilatation    No portal vein thrombosis  GALLBLADDER:  There are gallstone(s) within the gallbladder, without pericholecystic inflammatory changes  SPLEEN:  The liver is enlarged measuring 14 6 cm in length  PANCREAS:  Unremarkable  ADRENAL GLANDS:  Unremarkable  KIDNEYS/URETERS:  One or more simple renal cyst(s) is noted  No hydronephrosis  One or more sharply circumscribed subcentimeter renal hypodensities are noted  These lesions are too small to accurately characterize, but are statistically most likely to represent benign cortical renal cyst(s)  According to the guidelines published in   the [facility] Paper of the ACR Incidental Findings Committee (Radiology 2010), no further workup of these lesions is recommended  STOMACH AND BOWEL:  Postoperative changes of recent right mid abdominal diverting colostomy are noted  There is no evidence of large or small bowel obstruction  Again noted is an apple core lesion at the distal transverse colon which measures   approximately 5 cm in length  APPENDIX:  No findings to suggest appendicitis  ABDOMINOPELVIC CAVITY:  There is a small amount of intra-abdominal and pelvic ascites  No pneumoperitoneum  Although there is no lymphadenopathy, there are a few nonenlarged lymph nodes adjacent to the patient's distal transverse colonic mass similar   to the prior exam and suspicious for metastatic disease  VESSELS:  Unremarkable for patient's age  PELVIS    REPRODUCTIVE ORGANS:  Unremarkable for patient's age  URINARY BLADDER:  Unremarkable  ABDOMINAL WALL/INGUINAL REGIONS:  Unremarkable  OSSEOUS STRUCTURES:  No acute fracture or destructive osseous lesion  Impression: Postoperative changes of recent right mid abdominal diverting colostomy  There is no evidence of obstruction  The small bowel loops are of normal caliber  Small amount of intra-abdominal and pelvic ascites  No free intraperitoneal air      Innumerable hepatic metastases similar to the prior exam     Reidentified apple core cortical lesion at the distal transverse colon  Workstation performed: [id number]        LABS:  Lab data are reviewed and documented in HemOnc history  No results found for this or any previous visit (from the past 48 hour(s))      Ovi Underwood MD  6/5/2022, 3:37 PM

## 2022-06-06 ENCOUNTER — DOCUMENTATION (OUTPATIENT)
Dept: HEMATOLOGY ONCOLOGY | Facility: CLINIC | Age: 57
End: 2022-06-06

## 2022-06-06 DIAGNOSIS — C18.4 MALIGNANT NEOPLASM OF TRANSVERSE COLON (HCC): Primary | ICD-10-CM

## 2022-06-06 RX ORDER — FLUOROURACIL 50 MG/ML
400 INJECTION, SOLUTION INTRAVENOUS ONCE
Status: CANCELLED | OUTPATIENT
Start: 2022-06-14

## 2022-06-06 RX ORDER — FLUOROURACIL 50 MG/ML
400 INJECTION, SOLUTION INTRAVENOUS ONCE
Status: CANCELLED | OUTPATIENT
Start: 2022-06-29

## 2022-06-06 NOTE — PROGRESS NOTES
Call out to patient and spoke to him in regards to CT scan  Reviewed CT SCAN will have PO contrast only with no IV contrast  MRI order is scheduled and will continue to have that scheduled in July  Patient appreciative of call

## 2022-06-06 NOTE — PROGRESS NOTES
Telephone message from patient regarding his upcoming CT scan scheduled for tomorrow 6/7  Patient indicates he got a message indicating he needed to reschedule his scan due to the IV contrast shortage  He has not spoken to anyone yet regarding this however in the message stated they will call again this evening  Patient is concerned as to what impact this will have on his continuing care  Please review with doctor and call patient to advise    Thank you

## 2022-06-07 ENCOUNTER — HOSPITAL ENCOUNTER (OUTPATIENT)
Dept: CT IMAGING | Facility: HOSPITAL | Age: 57
Discharge: HOME | End: 2022-06-07
Payer: COMMERCIAL

## 2022-06-07 DIAGNOSIS — C18.4 MALIGNANT NEOPLASM OF TRANSVERSE COLON (HCC): ICD-10-CM

## 2022-06-07 PROCEDURE — 71250 CT THORAX DX C-: CPT

## 2022-06-07 PROCEDURE — 74176 CT ABD & PELVIS W/O CONTRAST: CPT

## 2022-06-08 ENCOUNTER — HOSPITAL ENCOUNTER (OUTPATIENT)
Dept: INFUSION CENTER | Facility: CLINIC | Age: 57
Discharge: HOME | End: 2022-06-08
Payer: COMMERCIAL

## 2022-06-08 VITALS
SYSTOLIC BLOOD PRESSURE: 153 MMHG | HEART RATE: 92 BPM | TEMPERATURE: 97.9 F | RESPIRATION RATE: 18 BRPM | DIASTOLIC BLOOD PRESSURE: 85 MMHG

## 2022-06-08 DIAGNOSIS — D50.0 IRON DEFICIENCY ANEMIA DUE TO CHRONIC BLOOD LOSS: Primary | ICD-10-CM

## 2022-06-08 PROCEDURE — 96365 THER/PROPH/DIAG IV INF INIT: CPT

## 2022-06-08 RX ORDER — SODIUM CHLORIDE 9 MG/ML
20 INJECTION, SOLUTION INTRAVENOUS ONCE
Status: CANCELLED | OUTPATIENT
Start: 2022-06-08

## 2022-06-08 RX ORDER — SODIUM CHLORIDE 9 MG/ML
20 INJECTION, SOLUTION INTRAVENOUS ONCE
Status: COMPLETED | OUTPATIENT
Start: 2022-06-08 | End: 2022-06-08

## 2022-06-08 RX ADMIN — SODIUM CHLORIDE 20 ML/HR: 0.9 INJECTION, SOLUTION INTRAVENOUS at 13:02

## 2022-06-08 RX ADMIN — SODIUM CHLORIDE 300 MG: 0.9 INJECTION, SOLUTION INTRAVENOUS at 13:04

## 2022-06-08 NOTE — PROGRESS NOTES
Patient to clinic for Venofer infusion  Tolerated infusion without difficulty  Confirmed that he has an appointment scheduled for Labs prior to chemo  Port removed  AVS refused

## 2022-06-09 DIAGNOSIS — K21.9 GASTROESOPHAGEAL REFLUX DISEASE WITHOUT ESOPHAGITIS: ICD-10-CM

## 2022-06-09 RX ORDER — METOCLOPRAMIDE 10 MG/1
TABLET ORAL
Qty: 180 TABLET | Refills: 0 | Status: SHIPPED | OUTPATIENT
Start: 2022-06-09

## 2022-06-11 LAB
ALBUMIN SERPL-MCNC: 3.6 G/DL (ref 3.8–4.9)
ALBUMIN/GLOB SERPL: 1.5 {RATIO} (ref 1.2–2.2)
ALP SERPL-CCNC: 260 IU/L (ref 44–121)
ALT SERPL-CCNC: 18 IU/L (ref 0–44)
APPEARANCE UR: CLEAR
AST SERPL-CCNC: 30 IU/L (ref 0–40)
BACTERIA URNS QL MICRO: NORMAL
BASOPHILS # BLD AUTO: 0.1 X10E3/UL (ref 0–0.2)
BASOPHILS NFR BLD AUTO: 1 %
BILIRUB SERPL-MCNC: 0.4 MG/DL (ref 0–1.2)
BILIRUB UR QL STRIP: NEGATIVE
BUN SERPL-MCNC: 16 MG/DL (ref 6–24)
BUN/CREAT SERPL: 13 (ref 9–20)
CALCIUM SERPL-MCNC: 8.9 MG/DL (ref 8.7–10.2)
CASTS URNS QL MICRO: NORMAL /LPF
CHLORIDE SERPL-SCNC: 103 MMOL/L (ref 96–106)
CO2 SERPL-SCNC: 22 MMOL/L (ref 20–29)
COLOR UR: YELLOW
CREAT SERPL-MCNC: 1.24 MG/DL (ref 0.76–1.27)
EGFR: 68 ML/MIN/1.73
EOSINOPHIL # BLD AUTO: 0 X10E3/UL (ref 0–0.4)
EOSINOPHIL NFR BLD AUTO: 0 %
EPI CELLS #/AREA URNS HPF: NORMAL /HPF (ref 0–10)
ERYTHROCYTE [DISTWIDTH] IN BLOOD BY AUTOMATED COUNT: 19.2 % (ref 11.6–15.4)
GLOBULIN SER-MCNC: 2.4 G/DL (ref 1.5–4.5)
GLUCOSE SERPL-MCNC: 193 MG/DL (ref 65–99)
GLUCOSE UR QL: ABNORMAL
HCT VFR BLD AUTO: 40.4 % (ref 37.5–51)
HGB BLD-MCNC: 13.6 G/DL (ref 13–17.7)
HGB UR QL STRIP: NEGATIVE
IMM GRANULOCYTES # BLD: 0.1 X10E3/UL (ref 0–0.1)
IMM GRANULOCYTES NFR BLD: 1 %
KETONES UR QL STRIP: NEGATIVE
LEUKOCYTE ESTERASE UR QL STRIP: NEGATIVE
LYMPHOCYTES # BLD AUTO: 2.1 X10E3/UL (ref 0.7–3.1)
LYMPHOCYTES NFR BLD AUTO: 24 %
MCH RBC QN AUTO: 29.6 PG (ref 26.6–33)
MCHC RBC AUTO-ENTMCNC: 33.7 G/DL (ref 31.5–35.7)
MCV RBC AUTO: 88 FL (ref 79–97)
MICRO URNS: ABNORMAL
MONOCYTES # BLD AUTO: 1 X10E3/UL (ref 0.1–0.9)
MONOCYTES NFR BLD AUTO: 11 %
NEUTROPHILS # BLD AUTO: 5.5 X10E3/UL (ref 1.4–7)
NEUTROPHILS NFR BLD AUTO: 63 %
NITRITE UR QL STRIP: NEGATIVE
PH UR STRIP: 6 [PH] (ref 5–7.5)
PLATELET # BLD AUTO: 284 X10E3/UL (ref 150–450)
POTASSIUM SERPL-SCNC: 4.1 MMOL/L (ref 3.5–5.2)
PROT SERPL-MCNC: 6 G/DL (ref 6–8.5)
PROT UR QL STRIP: ABNORMAL
RBC # BLD AUTO: 4.6 X10E6/UL (ref 4.14–5.8)
RBC #/AREA URNS HPF: NORMAL /HPF (ref 0–2)
SODIUM SERPL-SCNC: 140 MMOL/L (ref 134–144)
SP GR UR: 1.02 (ref 1–1.03)
UROBILINOGEN UR STRIP-ACNC: 0.2 MG/DL (ref 0.2–1)
WBC # BLD AUTO: 8.7 X10E3/UL (ref 3.4–10.8)
WBC #/AREA URNS HPF: NORMAL /HPF (ref 0–5)

## 2022-06-14 ENCOUNTER — HOSPITAL ENCOUNTER (OUTPATIENT)
Dept: INFUSION CENTER | Facility: CLINIC | Age: 57
Discharge: HOME | End: 2022-06-14
Payer: COMMERCIAL

## 2022-06-14 VITALS
HEART RATE: 89 BPM | RESPIRATION RATE: 18 BRPM | BODY MASS INDEX: 37.95 KG/M2 | DIASTOLIC BLOOD PRESSURE: 84 MMHG | TEMPERATURE: 97.1 F | HEIGHT: 67 IN | SYSTOLIC BLOOD PRESSURE: 136 MMHG | WEIGHT: 241.8 LBS

## 2022-06-14 DIAGNOSIS — C18.4 MALIGNANT NEOPLASM OF TRANSVERSE COLON (HCC): Primary | ICD-10-CM

## 2022-06-14 PROCEDURE — 96368 THER/DIAG CONCURRENT INF: CPT

## 2022-06-14 PROCEDURE — 96411 CHEMO IV PUSH ADDL DRUG: CPT

## 2022-06-14 PROCEDURE — 96367 TX/PROPH/DG ADDL SEQ IV INF: CPT

## 2022-06-14 PROCEDURE — G0498 CHEMO EXTEND IV INFUS W/PUMP: HCPCS

## 2022-06-14 PROCEDURE — 96417 CHEMO IV INFUS EACH ADDL SEQ: CPT

## 2022-06-14 PROCEDURE — 96415 CHEMO IV INFUSION ADDL HR: CPT

## 2022-06-14 PROCEDURE — 96413 CHEMO IV INFUSION 1 HR: CPT

## 2022-06-14 RX ORDER — DEXTROSE MONOHYDRATE 50 MG/ML
20 INJECTION, SOLUTION INTRAVENOUS ONCE
Status: COMPLETED | OUTPATIENT
Start: 2022-06-14 | End: 2022-06-14

## 2022-06-14 RX ORDER — SODIUM CHLORIDE 9 MG/ML
20 INJECTION, SOLUTION INTRAVENOUS ONCE
Status: COMPLETED | OUTPATIENT
Start: 2022-06-14 | End: 2022-06-14

## 2022-06-14 RX ORDER — FLUOROURACIL 50 MG/ML
400 INJECTION, SOLUTION INTRAVENOUS ONCE
Status: COMPLETED | OUTPATIENT
Start: 2022-06-14 | End: 2022-06-14

## 2022-06-14 RX ADMIN — FLUOROURACIL 875 MG: 50 INJECTION, SOLUTION INTRAVENOUS at 13:40

## 2022-06-14 RX ADMIN — DEXAMETHASONE SODIUM PHOSPHATE: 10 INJECTION, SOLUTION INTRAMUSCULAR; INTRAVENOUS at 09:41

## 2022-06-14 RX ADMIN — LEUCOVORIN CALCIUM 876 MG: 350 INJECTION, POWDER, LYOPHILIZED, FOR SOLUTION INTRAMUSCULAR; INTRAVENOUS at 11:30

## 2022-06-14 RX ADMIN — OXALIPLATIN 186.15 MG: 5 INJECTION, SOLUTION INTRAVENOUS at 11:30

## 2022-06-14 RX ADMIN — BEVACIZUMAB-AWWB 545 MG: 400 INJECTION, SOLUTION INTRAVENOUS at 10:31

## 2022-06-14 RX ADMIN — SODIUM CHLORIDE 20 ML/HR: 0.9 INJECTION, SOLUTION INTRAVENOUS at 09:40

## 2022-06-14 RX ADMIN — DEXTROSE 20 ML/HR: 5 SOLUTION INTRAVENOUS at 11:18

## 2022-06-14 NOTE — PROGRESS NOTES
Pt to clinic for FOLFOX and avastin treatment with elastometric pump hook-up  Offers no complaints today  Pump hooked up to pt's port and clamps open to run  Pt tolerated infusions and pump hook-up without complications  Aware of when to return for pump disconnect  AVS declined

## 2022-06-15 ENCOUNTER — OFFICE VISIT (OUTPATIENT)
Dept: HEMATOLOGY ONCOLOGY | Facility: CLINIC | Age: 57
End: 2022-06-15
Payer: COMMERCIAL

## 2022-06-15 ENCOUNTER — TELEPHONE (OUTPATIENT)
Dept: HEMATOLOGY ONCOLOGY | Facility: CLINIC | Age: 57
End: 2022-06-15

## 2022-06-15 VITALS
HEIGHT: 67 IN | SYSTOLIC BLOOD PRESSURE: 124 MMHG | HEART RATE: 96 BPM | TEMPERATURE: 98.6 F | BODY MASS INDEX: 37.83 KG/M2 | WEIGHT: 241 LBS | OXYGEN SATURATION: 95 % | DIASTOLIC BLOOD PRESSURE: 78 MMHG | RESPIRATION RATE: 18 BRPM

## 2022-06-15 DIAGNOSIS — C78.7 COLON CANCER METASTASIZED TO LIVER (HCC): ICD-10-CM

## 2022-06-15 DIAGNOSIS — C18.9 COLON CANCER METASTASIZED TO LIVER (HCC): ICD-10-CM

## 2022-06-15 DIAGNOSIS — R53.83 OTHER FATIGUE: ICD-10-CM

## 2022-06-15 DIAGNOSIS — C18.4 MALIGNANT NEOPLASM OF TRANSVERSE COLON (HCC): Primary | ICD-10-CM

## 2022-06-15 PROCEDURE — 3074F SYST BP LT 130 MM HG: CPT | Performed by: INTERNAL MEDICINE

## 2022-06-15 PROCEDURE — 99214 OFFICE O/P EST MOD 30 MIN: CPT | Performed by: INTERNAL MEDICINE

## 2022-06-15 PROCEDURE — 3008F BODY MASS INDEX DOCD: CPT | Performed by: INTERNAL MEDICINE

## 2022-06-15 PROCEDURE — 3078F DIAST BP <80 MM HG: CPT | Performed by: INTERNAL MEDICINE

## 2022-06-16 ENCOUNTER — HOSPITAL ENCOUNTER (OUTPATIENT)
Dept: INFUSION CENTER | Facility: CLINIC | Age: 57
Discharge: HOME | End: 2022-06-16

## 2022-06-16 DIAGNOSIS — R53.83 OTHER FATIGUE: ICD-10-CM

## 2022-06-16 DIAGNOSIS — C18.4 MALIGNANT NEOPLASM OF TRANSVERSE COLON (HCC): Primary | ICD-10-CM

## 2022-06-16 NOTE — PROGRESS NOTES
Pt here for Elastomeric pump d/c  Pump is flat and appears empty  Pt tolerated treatment well, has no concerns  Port flushed blood return noted  Port de-accessed  Pt's pump bag was ripped, pt provided with a new one   Pt aware of next appt, has my chart and declined avs

## 2022-06-21 DIAGNOSIS — R53.83 OTHER FATIGUE: Primary | ICD-10-CM

## 2022-06-21 DIAGNOSIS — C18.4 MALIGNANT NEOPLASM OF TRANSVERSE COLON (HCC): ICD-10-CM

## 2022-06-22 ENCOUNTER — PATIENT OUTREACH (OUTPATIENT)
Dept: HEMATOLOGY ONCOLOGY | Facility: CLINIC | Age: 57
End: 2022-06-22

## 2022-06-22 NOTE — PROGRESS NOTES
Telephone call from patient regarding his recent appointment with Dr Aurea Delgadillo indicated Dr Aurea Landrum was ordering treatment response scan and possible referral to Surgical oncology as well as having him discussed at the Tumor conference  I explained the typical order of things and that Dr Aurea Landrum was most likely waiting for the next scan results BEFORE referring to Surg Onc  Patient thanked me for the explanation

## 2022-06-25 LAB
ALBUMIN SERPL-MCNC: 3.5 G/DL (ref 3.8–4.9)
ALBUMIN SERPL-MCNC: 3.6 G/DL (ref 3.8–4.9)
ALBUMIN/GLOB SERPL: 1.6 {RATIO} (ref 1.2–2.2)
ALBUMIN/GLOB SERPL: 1.6 {RATIO} (ref 1.2–2.2)
ALP SERPL-CCNC: 222 IU/L (ref 44–121)
ALP SERPL-CCNC: 224 IU/L (ref 44–121)
ALT SERPL-CCNC: 17 IU/L (ref 0–44)
ALT SERPL-CCNC: 17 IU/L (ref 0–44)
APPEARANCE UR: CLEAR
AST SERPL-CCNC: 28 IU/L (ref 0–40)
AST SERPL-CCNC: 30 IU/L (ref 0–40)
BACTERIA URNS QL MICRO: NORMAL
BASOPHILS # BLD AUTO: 0 X10E3/UL (ref 0–0.2)
BASOPHILS # BLD AUTO: 0.1 X10E3/UL (ref 0–0.2)
BASOPHILS NFR BLD AUTO: 1 %
BASOPHILS NFR BLD AUTO: 1 %
BILIRUB SERPL-MCNC: 0.6 MG/DL (ref 0–1.2)
BILIRUB SERPL-MCNC: 0.6 MG/DL (ref 0–1.2)
BILIRUB UR QL STRIP: NEGATIVE
BUN SERPL-MCNC: 21 MG/DL (ref 6–24)
BUN SERPL-MCNC: 22 MG/DL (ref 6–24)
BUN/CREAT SERPL: 16 (ref 9–20)
BUN/CREAT SERPL: 17 (ref 9–20)
CALCIUM SERPL-MCNC: 8.8 MG/DL (ref 8.7–10.2)
CALCIUM SERPL-MCNC: 9 MG/DL (ref 8.7–10.2)
CASTS URNS QL MICRO: NORMAL /LPF
CHLORIDE SERPL-SCNC: 101 MMOL/L (ref 96–106)
CHLORIDE SERPL-SCNC: 101 MMOL/L (ref 96–106)
CO2 SERPL-SCNC: 21 MMOL/L (ref 20–29)
CO2 SERPL-SCNC: 22 MMOL/L (ref 20–29)
COLOR UR: YELLOW
CREAT SERPL-MCNC: 1.28 MG/DL (ref 0.76–1.27)
CREAT SERPL-MCNC: 1.31 MG/DL (ref 0.76–1.27)
EGFR: 63 ML/MIN/1.73
EGFR: 65 ML/MIN/1.73
EOSINOPHIL # BLD AUTO: 0.2 X10E3/UL (ref 0–0.4)
EOSINOPHIL # BLD AUTO: 0.2 X10E3/UL (ref 0–0.4)
EOSINOPHIL NFR BLD AUTO: 2 %
EOSINOPHIL NFR BLD AUTO: 2 %
EPI CELLS #/AREA URNS HPF: NORMAL /HPF (ref 0–10)
ERYTHROCYTE [DISTWIDTH] IN BLOOD BY AUTOMATED COUNT: 17.9 % (ref 11.6–15.4)
ERYTHROCYTE [DISTWIDTH] IN BLOOD BY AUTOMATED COUNT: 18 % (ref 11.6–15.4)
GLOBULIN SER-MCNC: 2.2 G/DL (ref 1.5–4.5)
GLOBULIN SER-MCNC: 2.2 G/DL (ref 1.5–4.5)
GLUCOSE SERPL-MCNC: 183 MG/DL (ref 65–99)
GLUCOSE SERPL-MCNC: 195 MG/DL (ref 65–99)
GLUCOSE UR QL: ABNORMAL
HCT VFR BLD AUTO: 39.4 % (ref 37.5–51)
HCT VFR BLD AUTO: 40.9 % (ref 37.5–51)
HGB BLD-MCNC: 13.4 G/DL (ref 13–17.7)
HGB BLD-MCNC: 13.8 G/DL (ref 13–17.7)
HGB UR QL STRIP: NEGATIVE
IMM GRANULOCYTES # BLD: 0 X10E3/UL (ref 0–0.1)
IMM GRANULOCYTES # BLD: 0 X10E3/UL (ref 0–0.1)
IMM GRANULOCYTES NFR BLD: 0 %
IMM GRANULOCYTES NFR BLD: 0 %
KETONES UR QL STRIP: NEGATIVE
LEUKOCYTE ESTERASE UR QL STRIP: NEGATIVE
LYMPHOCYTES # BLD AUTO: 1.7 X10E3/UL (ref 0.7–3.1)
LYMPHOCYTES # BLD AUTO: 1.9 X10E3/UL (ref 0.7–3.1)
LYMPHOCYTES NFR BLD AUTO: 23 %
LYMPHOCYTES NFR BLD AUTO: 25 %
MCH RBC QN AUTO: 30.2 PG (ref 26.6–33)
MCH RBC QN AUTO: 30.3 PG (ref 26.6–33)
MCHC RBC AUTO-ENTMCNC: 33.7 G/DL (ref 31.5–35.7)
MCHC RBC AUTO-ENTMCNC: 34 G/DL (ref 31.5–35.7)
MCV RBC AUTO: 89 FL (ref 79–97)
MCV RBC AUTO: 90 FL (ref 79–97)
MICRO URNS: ABNORMAL
MONOCYTES # BLD AUTO: 0.9 X10E3/UL (ref 0.1–0.9)
MONOCYTES # BLD AUTO: 0.9 X10E3/UL (ref 0.1–0.9)
MONOCYTES NFR BLD AUTO: 12 %
MONOCYTES NFR BLD AUTO: 12 %
NEUTROPHILS # BLD AUTO: 4.5 X10E3/UL (ref 1.4–7)
NEUTROPHILS # BLD AUTO: 4.6 X10E3/UL (ref 1.4–7)
NEUTROPHILS NFR BLD AUTO: 60 %
NEUTROPHILS NFR BLD AUTO: 62 %
NITRITE UR QL STRIP: NEGATIVE
PH UR STRIP: 6 [PH] (ref 5–7.5)
PLATELET # BLD AUTO: 259 X10E3/UL (ref 150–450)
PLATELET # BLD AUTO: 271 X10E3/UL (ref 150–450)
POTASSIUM SERPL-SCNC: 3.8 MMOL/L (ref 3.5–5.2)
POTASSIUM SERPL-SCNC: 3.8 MMOL/L (ref 3.5–5.2)
PROT SERPL-MCNC: 5.7 G/DL (ref 6–8.5)
PROT SERPL-MCNC: 5.8 G/DL (ref 6–8.5)
PROT UR QL STRIP: ABNORMAL
RBC # BLD AUTO: 4.44 X10E6/UL (ref 4.14–5.8)
RBC # BLD AUTO: 4.56 X10E6/UL (ref 4.14–5.8)
RBC #/AREA URNS HPF: NORMAL /HPF (ref 0–2)
SODIUM SERPL-SCNC: 135 MMOL/L (ref 134–144)
SODIUM SERPL-SCNC: 137 MMOL/L (ref 134–144)
SP GR UR: 1.02 (ref 1–1.03)
UROBILINOGEN UR STRIP-ACNC: 1 MG/DL (ref 0.2–1)
WBC # BLD AUTO: 7.5 X10E3/UL (ref 3.4–10.8)
WBC # BLD AUTO: 7.5 X10E3/UL (ref 3.4–10.8)
WBC #/AREA URNS HPF: NORMAL /HPF (ref 0–5)

## 2022-06-29 ENCOUNTER — HOSPITAL ENCOUNTER (OUTPATIENT)
Dept: INFUSION CENTER | Facility: CLINIC | Age: 57
Discharge: HOME | End: 2022-06-29
Payer: COMMERCIAL

## 2022-06-29 ENCOUNTER — PATIENT OUTREACH (OUTPATIENT)
Dept: CASE MANAGEMENT | Facility: HOSPITAL | Age: 57
End: 2022-06-29

## 2022-06-29 VITALS
RESPIRATION RATE: 16 BRPM | DIASTOLIC BLOOD PRESSURE: 78 MMHG | BODY MASS INDEX: 38.14 KG/M2 | WEIGHT: 243 LBS | HEIGHT: 67 IN | TEMPERATURE: 98.1 F | SYSTOLIC BLOOD PRESSURE: 132 MMHG | HEART RATE: 76 BPM

## 2022-06-29 DIAGNOSIS — C18.4 MALIGNANT NEOPLASM OF TRANSVERSE COLON (HCC): Primary | ICD-10-CM

## 2022-06-29 DIAGNOSIS — R53.83 OTHER FATIGUE: ICD-10-CM

## 2022-06-29 PROCEDURE — 96367 TX/PROPH/DG ADDL SEQ IV INF: CPT

## 2022-06-29 PROCEDURE — G0498 CHEMO EXTEND IV INFUS W/PUMP: HCPCS

## 2022-06-29 PROCEDURE — 96368 THER/DIAG CONCURRENT INF: CPT

## 2022-06-29 PROCEDURE — 96413 CHEMO IV INFUSION 1 HR: CPT

## 2022-06-29 PROCEDURE — 96417 CHEMO IV INFUS EACH ADDL SEQ: CPT

## 2022-06-29 PROCEDURE — 96415 CHEMO IV INFUSION ADDL HR: CPT

## 2022-06-29 PROCEDURE — 96411 CHEMO IV PUSH ADDL DRUG: CPT

## 2022-06-29 RX ORDER — FLUOROURACIL 50 MG/ML
400 INJECTION, SOLUTION INTRAVENOUS ONCE
Status: COMPLETED | OUTPATIENT
Start: 2022-06-29 | End: 2022-06-29

## 2022-06-29 RX ORDER — SODIUM CHLORIDE 9 MG/ML
20 INJECTION, SOLUTION INTRAVENOUS ONCE
Status: COMPLETED | OUTPATIENT
Start: 2022-06-29 | End: 2022-06-29

## 2022-06-29 RX ORDER — DEXTROSE MONOHYDRATE 50 MG/ML
20 INJECTION, SOLUTION INTRAVENOUS ONCE
Status: COMPLETED | OUTPATIENT
Start: 2022-06-29 | End: 2022-06-29

## 2022-06-29 RX ADMIN — BEVACIZUMAB-AWWB 545 MG: 400 INJECTION, SOLUTION INTRAVENOUS at 10:06

## 2022-06-29 RX ADMIN — FLUOROURACIL 875 MG: 50 INJECTION, SOLUTION INTRAVENOUS at 12:56

## 2022-06-29 RX ADMIN — DEXAMETHASONE SODIUM PHOSPHATE: 10 INJECTION, SOLUTION INTRAMUSCULAR; INTRAVENOUS at 09:28

## 2022-06-29 RX ADMIN — SODIUM CHLORIDE 20 ML/HR: 0.9 INJECTION, SOLUTION INTRAVENOUS at 09:28

## 2022-06-29 RX ADMIN — DEXTROSE 20 ML/HR: 5 SOLUTION INTRAVENOUS at 10:48

## 2022-06-29 RX ADMIN — OXALIPLATIN 186.15 MG: 5 INJECTION, SOLUTION INTRAVENOUS at 10:55

## 2022-06-29 RX ADMIN — LEUCOVORIN CALCIUM 876 MG: 350 INJECTION, POWDER, LYOPHILIZED, FOR SOLUTION INTRAMUSCULAR; INTRAVENOUS at 10:55

## 2022-06-29 NOTE — PROGRESS NOTES
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  His ultimate goal is to return to work as [employer] in the [employer] in January  He was recently able to go into the city for a trial run and put his costume on to see how he could tolerate it, and talked about how emotional that was for him 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 [employer] and travelling the world giving that experience to people who may have otherwise never seen a production like that  He was appreciative of the visit to check in but denies any needs or concerns at this time  MSW will continue to check in with him periodically and offer support and assistance as needed  No concerns at this time

## 2022-06-29 NOTE — PROGRESS NOTES
Pt presents for FOLFOX and Avastin infusion offeringno compliants  Pt tolerated treatment without incident  Elastomeric infusion deviced attached to patient,clamps open  Pt aware on when to return to remove infusion device  AVS declined  Next appointment reviewed

## 2022-06-30 DIAGNOSIS — E78.2 MIXED HYPERLIPIDEMIA: ICD-10-CM

## 2022-06-30 RX ORDER — AMLODIPINE BESYLATE 5 MG/1
TABLET ORAL
Qty: 180 TABLET | Refills: 0 | Status: SHIPPED | OUTPATIENT
Start: 2022-06-30

## 2022-06-30 RX ORDER — ATORVASTATIN CALCIUM 40 MG/1
TABLET, FILM COATED ORAL
Qty: 90 TABLET | Refills: 3 | Status: SHIPPED | OUTPATIENT
Start: 2022-06-30

## 2022-07-01 ENCOUNTER — TELEPHONE (OUTPATIENT)
Dept: HEMATOLOGY ONCOLOGY | Facility: CLINIC | Age: 57
End: 2022-07-01

## 2022-07-01 ENCOUNTER — HOSPITAL ENCOUNTER (OUTPATIENT)
Dept: INFUSION CENTER | Facility: CLINIC | Age: 57
Discharge: HOME | End: 2022-07-01

## 2022-07-01 DIAGNOSIS — C18.4 MALIGNANT NEOPLASM OF TRANSVERSE COLON (HCC): Primary | ICD-10-CM

## 2022-07-01 DIAGNOSIS — R53.83 OTHER FATIGUE: ICD-10-CM

## 2022-07-01 NOTE — TELEPHONE ENCOUNTER
Please cancel MRI of the chest, abdomen and pelvis on 7/8  Insurance is denying it  I My Chart Messaged the patient in regards to this being cancelled

## 2022-07-01 NOTE — TELEPHONE ENCOUNTER
Good morning  The pt is scheduled for an MRI of the chest, abdomen and pelvis on 7/8  His insurance is denying this request stating that the same or similar test has already been performed and they feel that this was enough to tell the physician what they were looking for  They do not feel it is medically necessary to approve another test that is similar within the time frame (referring to the CT CAP from 6/7)  There is an option to schedule a peer to peer by calling [phone]  Ari Nunez member ID: [id number]   Case #[id number]     Please let us know if you will be scheduling the peer to peer or having the exam canceled  Thank you

## 2022-07-01 NOTE — TELEPHONE ENCOUNTER
Per review of MRI schedule and patient MRI of Chest, Abdomen and pelvis cancelled due to insurance purposes

## 2022-07-01 NOTE — PROGRESS NOTES
Pt presents for Elastomeric pump disconnection  Pt offers no complaints  Pump deflated  Port flushed and de accessed without difficulty  AVS declined, next appointment reviewed

## 2022-07-01 NOTE — TELEPHONE ENCOUNTER
Reviewed with Dr óMnica Gill and MRI is not needed at this time  MRI cancelled   My Chart message to patient to notify of cancellation of MRI due to insurance coverage

## 2022-07-09 LAB
ALBUMIN SERPL-MCNC: 3.4 G/DL (ref 3.8–4.9)
ALBUMIN/GLOB SERPL: 1.4 {RATIO} (ref 1.2–2.2)
ALP SERPL-CCNC: 238 IU/L (ref 44–121)
ALT SERPL-CCNC: 22 IU/L (ref 0–44)
APPEARANCE UR: CLEAR
AST SERPL-CCNC: 29 IU/L (ref 0–40)
BACTERIA URNS QL MICRO: NORMAL
BASOPHILS # BLD AUTO: 0.1 X10E3/UL (ref 0–0.2)
BASOPHILS NFR BLD AUTO: 1 %
BILIRUB SERPL-MCNC: 0.7 MG/DL (ref 0–1.2)
BILIRUB UR QL STRIP: NEGATIVE
BUN SERPL-MCNC: 17 MG/DL (ref 6–24)
BUN/CREAT SERPL: 13 (ref 9–20)
CALCIUM SERPL-MCNC: 9 MG/DL (ref 8.7–10.2)
CASTS URNS QL MICRO: NORMAL /LPF
CHLORIDE SERPL-SCNC: 107 MMOL/L (ref 96–106)
CO2 SERPL-SCNC: 23 MMOL/L (ref 20–29)
COLOR UR: YELLOW
CREAT SERPL-MCNC: 1.26 MG/DL (ref 0.76–1.27)
EGFR: 67 ML/MIN/1.73
EOSINOPHIL # BLD AUTO: 0.2 X10E3/UL (ref 0–0.4)
EOSINOPHIL NFR BLD AUTO: 3 %
EPI CELLS #/AREA URNS HPF: NORMAL /HPF (ref 0–10)
ERYTHROCYTE [DISTWIDTH] IN BLOOD BY AUTOMATED COUNT: 16.6 % (ref 11.6–15.4)
GLOBULIN SER-MCNC: 2.5 G/DL (ref 1.5–4.5)
GLUCOSE SERPL-MCNC: 157 MG/DL (ref 65–99)
GLUCOSE UR QL: ABNORMAL
HCT VFR BLD AUTO: 37.8 % (ref 37.5–51)
HGB BLD-MCNC: 12.5 G/DL (ref 13–17.7)
HGB UR QL STRIP: NEGATIVE
IMM GRANULOCYTES # BLD: 0.1 X10E3/UL (ref 0–0.1)
IMM GRANULOCYTES NFR BLD: 1 %
KETONES UR QL STRIP: NEGATIVE
LEUKOCYTE ESTERASE UR QL STRIP: NEGATIVE
LYMPHOCYTES # BLD AUTO: 2.1 X10E3/UL (ref 0.7–3.1)
LYMPHOCYTES NFR BLD AUTO: 26 %
MCH RBC QN AUTO: 29.9 PG (ref 26.6–33)
MCHC RBC AUTO-ENTMCNC: 33.1 G/DL (ref 31.5–35.7)
MCV RBC AUTO: 90 FL (ref 79–97)
MICRO URNS: ABNORMAL
MONOCYTES # BLD AUTO: 0.9 X10E3/UL (ref 0.1–0.9)
MONOCYTES NFR BLD AUTO: 11 %
NEUTROPHILS # BLD AUTO: 4.8 X10E3/UL (ref 1.4–7)
NEUTROPHILS NFR BLD AUTO: 58 %
NITRITE UR QL STRIP: NEGATIVE
PH UR STRIP: 5.5 [PH] (ref 5–7.5)
PLATELET # BLD AUTO: 253 X10E3/UL (ref 150–450)
POTASSIUM SERPL-SCNC: 4.3 MMOL/L (ref 3.5–5.2)
PROT SERPL-MCNC: 5.9 G/DL (ref 6–8.5)
PROT UR QL STRIP: ABNORMAL
RBC # BLD AUTO: 4.18 X10E6/UL (ref 4.14–5.8)
RBC #/AREA URNS HPF: NORMAL /HPF (ref 0–2)
SODIUM SERPL-SCNC: 141 MMOL/L (ref 134–144)
SP GR UR: 1.02 (ref 1–1.03)
UROBILINOGEN UR STRIP-ACNC: 0.2 MG/DL (ref 0.2–1)
WBC # BLD AUTO: 8.1 X10E3/UL (ref 3.4–10.8)
WBC #/AREA URNS HPF: NORMAL /HPF (ref 0–5)

## 2022-07-13 ENCOUNTER — HOSPITAL ENCOUNTER (OUTPATIENT)
Dept: INFUSION CENTER | Facility: CLINIC | Age: 57
Discharge: HOME | End: 2022-07-13
Payer: COMMERCIAL

## 2022-07-13 ENCOUNTER — NUTRITION (OUTPATIENT)
Dept: NUTRITION | Facility: CLINIC | Age: 57
End: 2022-07-13

## 2022-07-13 VITALS
HEART RATE: 80 BPM | WEIGHT: 243.6 LBS | HEIGHT: 67 IN | RESPIRATION RATE: 18 BRPM | DIASTOLIC BLOOD PRESSURE: 76 MMHG | SYSTOLIC BLOOD PRESSURE: 128 MMHG | BODY MASS INDEX: 38.24 KG/M2 | TEMPERATURE: 98 F

## 2022-07-13 DIAGNOSIS — C18.4 MALIGNANT NEOPLASM OF TRANSVERSE COLON (HCC): Primary | ICD-10-CM

## 2022-07-13 DIAGNOSIS — R53.83 OTHER FATIGUE: ICD-10-CM

## 2022-07-13 DIAGNOSIS — Z71.3 NUTRITIONAL COUNSELING: Primary | ICD-10-CM

## 2022-07-13 PROCEDURE — 96367 TX/PROPH/DG ADDL SEQ IV INF: CPT

## 2022-07-13 PROCEDURE — 96411 CHEMO IV PUSH ADDL DRUG: CPT

## 2022-07-13 PROCEDURE — 96417 CHEMO IV INFUS EACH ADDL SEQ: CPT

## 2022-07-13 PROCEDURE — G0498 CHEMO EXTEND IV INFUS W/PUMP: HCPCS

## 2022-07-13 PROCEDURE — 96413 CHEMO IV INFUSION 1 HR: CPT

## 2022-07-13 PROCEDURE — 96368 THER/DIAG CONCURRENT INF: CPT

## 2022-07-13 PROCEDURE — 96415 CHEMO IV INFUSION ADDL HR: CPT

## 2022-07-13 RX ORDER — SODIUM CHLORIDE 9 MG/ML
20 INJECTION, SOLUTION INTRAVENOUS ONCE
Status: COMPLETED | OUTPATIENT
Start: 2022-07-13 | End: 2022-07-13

## 2022-07-13 RX ORDER — DEXTROSE MONOHYDRATE 50 MG/ML
20 INJECTION, SOLUTION INTRAVENOUS ONCE
Status: COMPLETED | OUTPATIENT
Start: 2022-07-13 | End: 2022-07-13

## 2022-07-13 RX ORDER — FLUOROURACIL 50 MG/ML
400 INJECTION, SOLUTION INTRAVENOUS ONCE
Status: COMPLETED | OUTPATIENT
Start: 2022-07-13 | End: 2022-07-13

## 2022-07-13 RX ADMIN — OXALIPLATIN 186.15 MG: 5 INJECTION, SOLUTION INTRAVENOUS at 10:52

## 2022-07-13 RX ADMIN — FLUOROURACIL 875 MG: 50 INJECTION, SOLUTION INTRAVENOUS at 13:07

## 2022-07-13 RX ADMIN — DEXAMETHASONE SODIUM PHOSPHATE: 10 INJECTION, SOLUTION INTRAMUSCULAR; INTRAVENOUS at 09:25

## 2022-07-13 RX ADMIN — LEUCOVORIN CALCIUM 876 MG: 350 INJECTION, POWDER, LYOPHILIZED, FOR SOLUTION INTRAMUSCULAR; INTRAVENOUS at 10:51

## 2022-07-13 RX ADMIN — SODIUM CHLORIDE 20 ML/HR: 0.9 INJECTION, SOLUTION INTRAVENOUS at 09:00

## 2022-07-13 RX ADMIN — BEVACIZUMAB-AWWB 545 MG: 400 INJECTION, SOLUTION INTRAVENOUS at 10:01

## 2022-07-13 RX ADMIN — DEXTROSE 20 ML/HR: 5 SOLUTION INTRAVENOUS at 10:39

## 2022-07-13 NOTE — PROGRESS NOTES
Outpatient Oncology Nutrition Consultation   Type of Consult: Follow Up  Care Location: Infusion Center    Reason for referral: From Infusion RN on 3/23/22 (reason for referral: Malnutrition Screening Tool (MST) Triggers: scored a 4 indicating >/=34# (>/=15 4 kg) recent wt loss and is eating poorly due to a decreased appetite  Date of MST: 3/23/22 )    Nutrition Assessment:   Oncology Diagnosis & Treatments: Diagnosed with colon cancer 3/1/22  · S/p colostomy 2/20/22  · Began chemotherapy (adrucil, oxaliplatin, mvasi) 3/23/22      Oncology History   Malignant neoplasm of transverse colon ([facility] [address] )   3/1/2022 Initial Diagnosis    Malignant neoplasm of transverse colon ([facility] [address] )     3/23/2022 -  Chemotherapy    fluorouracil (ADRUCIL), 400 mg/m2 = 905 mg, Intravenous, Once, 9 of 14 cycles  Administration: 905 mg (3/23/2022), 875 mg (4/6/2022), 875 mg (4/20/2022), 870 mg (5/4/2022), 880 mg (5/18/2022), 875 mg (6/1/2022), 875 mg (6/14/2022), 875 mg (6/29/2022)  leucovorin calcium IVPB, 904 mg, Intravenous, Once, 9 of 14 cycles  Administration: 900 mg (3/23/2022), 876 mg (4/6/2022), 876 mg (4/20/2022), 868 mg (5/4/2022), 880 mg (5/18/2022), 876 mg (6/1/2022), 876 mg (6/14/2022), 876 mg (6/29/2022)  oxaliplatin (ELOXATIN) chemo infusion, 85 mg/m2 = 192 1 mg, Intravenous, Once, 9 of 14 cycles  Administration: 192 1 mg (3/23/2022), 186 15 mg (4/6/2022), 186 15 mg (4/20/2022), 184 45 mg (5/4/2022), 187 mg (5/18/2022), 186 15 mg (6/1/2022), 186 15 mg (6/14/2022), 186 15 mg (6/29/2022)  fluorouracil (ADRUCIL) ambulatory infusion Soln, 1,200 mg/m2/day = 5,425 mg, Intravenous, Over 46 hours, 9 of 14 cycles  bevacizumab-awwb (MVASI) IVPB, 5 mg/kg = 545 mg (100 % of original dose 5 mg/kg), Intravenous, Once, 9 of 14 cycles  Dose modification: 5 mg/kg (original dose 5 mg/kg, Cycle 1)  Administration: 545 mg (3/23/2022), 500 mg (4/6/2022), 500 mg (4/20/2022), 500 mg (5/4/2022), 540 mg (5/18/2022), 535 mg (6/1/2022), 545 mg (6/14/2022), 545 mg (6/29/2022)       Past Medical & Surgical Hx: T2DM, gastroparesis, HLD, CKD, GERD  Patient Active Problem List   Diagnosis    Type 2 diabetes mellitus without complication, without long-term current use of insulin (HCC)    Benign essential hypertension    Mixed hyperlipidemia    Erectile dysfunction    Low testosterone    Obesity (BMI 30-39  9)    Testicular hypogonadism    Incarcerated umbilical hernia    Left ureteral calculus    Type 2 diabetes mellitus with hyperlipidemia (HCC)    Colonic mass    Microcytic anemia    Hypokalemia    Transaminitis    Thrombocytosis    Iron deficiency anemia, unspecified    Malignant neoplasm of transverse colon (HCC)    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)    Colon cancer metastasized to liver (HCC)    Colostomy prolapse (HCC)    Other fatigue     Past Medical History:   Diagnosis Date    Abdominal pain 3/12/2022    Acute renal failure ([facility] [address] )     [id number] resolved    Diabetes mellitus ([facility] [address] )     Enteritis 08/23/2016    Gastroparesis due to DM ([facility] [address] ) 08/23/2016    GERD (gastroesophageal reflux disease)     Hernia, ventral 08/04/2016    Hyperlipidemia     Hypertension     Morbid obesity ([facility] [address] ) 4/17/2018    Postoperative visit 3/2/2022    SIRS (systemic inflammatory response syndrome) ([facility] [address] ) 3/12/2022    Stage 3a chronic kidney disease ([facility] [address] ) 2/19/2022     Past Surgical History:   Procedure Laterality Date    COLOSTOMY N/A 2/20/2022    Procedure: COLOSTOMY LOOP, diverting;  Surgeon: Joann Argueta MD;  [facility]: [facility];  Service: General    ESOPHAGOGASTRODUODENOSCOPY N/A 8/24/2016    Procedure: ESOPHAGOGASTRODUODENOSCOPY (EGD); Surgeon: Cora Pike MD;  [facility];   Service:     IR PORT PLACEMENT  3/10/2022    KIDNEY STONE SURGERY      LIVER BIOPSY LAPAROSCOPIC N/A 2/20/2022    Procedure: DIAGNOSTIC LAPAROSCOPIC LIVER BIOPSY, DIVERTING LOOP COLOSTOMY ;  Surgeon: Joann Argueta MD;  [facility];  Service: General    TONSILLECTOMY      UMBILICAL HERNIA REPAIR      UMBILICAL HERNIA REPAIR LAPAROSCOPIC N/A 4/26/2019    Procedure: LAPAROSCOPIC UMBILICAL HERNIA REPAIR;  Surgeon: Hieu Orr MD;  [facility];  Service: General       Review of Medications:   Vitamins, Supplements and Herbals: Med List Reviewed & pt is only taking: vitamin D 400IU    Current Outpatient Medications:     amLODIPine (NORVASC) 10 mg tablet, Take 1 tablet (10 mg total) by mouth in the morning and 1 tablet (10 mg total) in the evening , Disp: 90 tablet, Rfl: 3    amLODIPine (NORVASC) 5 mg tablet, TAKE 1 TABLET BY MOUTH TWICE A DAY, Disp: 180 tablet, Rfl: 0    aspirin 81 MG tablet, Take 81 mg by mouth daily  , Disp: , Rfl:     atorvastatin (LIPITOR) 40 mg tablet, TAKE 1 TABLET BY MOUTH EVERY DAY, Disp: 90 tablet, Rfl: 3    Cholecalciferol (VITAMIN D3 PO), Take 400 Units by mouth daily, Disp: , Rfl:     Continuous Blood Gluc Sensor (UShealthrecordStyle Parrish 14 Day Sensor) MISC, Use 1 each every 14 (fourteen) days, Disp: 2 each, Rfl: 6    fluorouracil 5,255 mg in CADD/Elastomeric Infusion Device, Infuse 5,255 mg (1,200 mg/m2/day x 2 19 m2) into a venous catheter over 46 hours for 2 days  Do not start before July 13, 2022 , Disp: 1 each, Rfl: 0    glyBURIDE-metFORMIN (GLUCOVANCE) 5-500 MG per tablet, Take 1 tablet by mouth daily with breakfast Taking 1 tablet in the morning , Disp: , Rfl:     Insulin Pen Needle (B-D UF III MINI PEN NEEDLES) 31G X 5 MM MISC, Inject under the skin daily, Disp: 100 each, Rfl: 3    Januvia 100 MG tablet, TAKE 1 TABLET BY MOUTH EVERY DAY, Disp: 90 tablet, Rfl: 3    Lantus SoloStar 100 units/mL injection pen, INJECT 30 UNITS UNDER THE SKIN DAILY, Disp: 15 mL, Rfl: 1    lisinopril (ZESTRIL) 40 mg tablet, TAKE 1 TABLET BY MOUTH EVERY DAY, Disp: 90 tablet, Rfl: 3    metoclopramide (REGLAN) 10 mg tablet, TAKE 1 TABLET BY MOUTH TWICE A DAY, Disp: 180 tablet, Rfl: 0    Multiple Vitamins-Minerals (multivitamin with minerals) tablet, Take 1 tablet by mouth daily, Disp: , Rfl:     Needle, Disp, (HYPODERMIC NEEDLE) 23G X 1-1/2" MISC, by Does not apply route once a week, Disp: 10 each, Rfl: 6    ondansetron (ZOFRAN) 4 mg tablet, Take 4 mg by mouth every 6 (six) hours as needed, Disp: , Rfl:     Ostomy Supplies MISC, Use in the morning, Disp: 100 each, Rfl: 3    pantoprazole (PROTONIX) 40 mg tablet, TAKE 1 TABLET BY MOUTH TWICE A DAY, Disp: 180 tablet, Rfl: 3  No current facility-administered medications for this visit      Facility-Administered Medications Ordered in Other Visits:     bevacizumab-awwb (MVASI) 545 mg in sodium chloride 0 9 % 100 mL IVPB, 5 mg/kg (Treatment Plan Recorded), Intravenous, Once, Che Lyons MD, Last Rate: 243 6 mL/hr at 07/13/22 1001, 545 mg at 07/13/22 1001    dextrose 5 % infusion, 20 mL/hr, Intravenous, Once, Che Lyons MD    fluorouracil (ADRUCIL) injection 875 mg, 400 mg/m2 (Treatment Plan Recorded), Intravenous, Once, Che Lyons MD    leucovorin 876 mg in dextrose 5 % 250 mL IVPB, 400 mg/m2 (Treatment Plan Recorded), Intravenous, Once, Che Lyons MD    oxaliplatin (ELOXATIN) 186 15 mg in dextrose 5 % 250 mL chemo infusion, 85 mg/m2 (Treatment Plan Recorded), Intravenous, Once, Che Lyons MD    Most Recent Lab Results: Checks BG at home: 90-100mg/dL in the AM, ~200mg/dL in the evening   Lab Results   Component Value Date    WBC 8 1 07/08/2022    NEUTROABS 4 8 07/08/2022    IRON 26 (L) 02/20/2022    TIBC 235 (L) 02/20/2022    FERRITIN 125 02/20/2022    CHOLESTEROL 125 08/26/2021    CHOL 91 (L) 04/10/2017    TRIG 75 08/26/2021    HDL 45 08/26/2021    LDLCALC 65 08/26/2021    ALT 22 07/08/2022    AST 29 07/08/2022    ALB 3 4 (L) 07/08/2022     05/02/2017     04/10/2017    SODIUM 141 07/08/2022    SODIUM 137 06/24/2022    K 4 3 07/08/2022    K 3 8 06/24/2022     (H) 07/08/2022    BUN 17 07/08/2022    BUN 22 06/24/2022    CREATININE 1 26 07/08/2022    CREATININE 1 31 (H) 06/24/2022    EGFR 67 07/08/2022    TSH 0 719 08/26/2021    GLUCOSE 118 (H) 05/02/2017    POCGLU 309 (H) 03/14/2022    GLUF 246 (H) 03/14/2022    GLUC 157 (H) 07/08/2022    HGBA1C 8 7 (H) 05/27/2022    HGBA1C 7 5 (H) 02/10/2022    HGBA1C 8 0 (H) 08/26/2021    CALCIUM 7 9 (L) 03/14/2022       Anthropometric Measurements:   Height: 68"  Ht Readings from Last 1 Encounters:   07/13/22 5' 7 01" (1 702 m)     Wt Readings from Last 20 Encounters:   07/13/22 110 kg (243 lb 9 6 oz)   06/29/22 110 kg (243 lb)   06/15/22 109 kg (241 lb)   06/14/22 110 kg (241 lb 12 8 oz)   06/01/22 109 kg (239 lb 3 2 oz)   05/27/22 108 kg (237 lb)   05/24/22 108 kg (237 lb 6 4 oz)   05/18/22 107 kg (236 lb 9 6 oz)   05/06/22 108 kg (237 lb)   05/04/22 101 kg (223 lb 5 2 oz)   04/28/22 106 kg (232 lb 9 6 oz)   04/20/22 104 kg (229 lb 3 2 oz)   04/06/22 104 kg (228 lb 6 4 oz)   04/04/22 103 kg (227 lb)   03/23/22 105 kg (231 lb 11 3 oz)   03/18/22 105 kg (231 lb 3 2 oz)   03/17/22 105 kg (232 lb)   03/14/22 110 kg (241 lb 10 oz)   03/10/22 108 kg (239 lb)   03/07/22 109 kg (239 lb 6 4 oz)     Weight History:    Usual Weight: 290#   Varian: n/a   Home Scale: none     Oncology Nutrition-Anthropometrics    Flowsheet Row Nutrition from 7/13/2022 in [address] [place] [address] Nutrition from 6/1/2022 in [address] [place] [address]   Patient age (years): 61 years 61 years   Patient (male) height (in): 76 in 76 in   Current weight (lbs): 243 6 lbs 239 2 lbs   Current weight to be used for anthropometric calculations (kg) 110 7 kg 108 7 kg   BMI: 37 36 4   IBW male 154 lb 154 lb   IBW (kg) male 70 kg 70 kg   IBW % (male) 158 2 % 155 3 %   Adjusted BW (male): 176 4 lbs 175 3 lbs   Adjusted BW in kg (male): 80 2 kg 79 7 kg   % weight change after 1 week: -- 0 8 %   Weight change after 1 week (lbs) -- 1 8 lbs   % weight change after 1 month: 1 1 % 7 1 %   Weight change after 1 month (lbs) 2 6 lbs 15 9 lbs   % weight change after 3 months: 6 3 % -0 3 %   Weight change after 3 months (lbs) 14 4 lbs -0 8 lbs          Nutrition-Focused Physical Findings: none observed    Food/Nutrition-Related History & Client/Social History:    Current Nutrition Impact Symptoms:  [] Nausea  [] Reduced Appetite  [] Acid Reflux    [] Vomiting  [] Unintended Wt Loss  [] Malabsorption    [] Diarrhea  [] Unintended Wt Gain  [] Dumping Syndrome    [] Constipation  [] Thick Mucous/Secretions  [] Abdominal Pain    [] Dysgeusia (Altered Taste)  [] Xerostomia (Dry Mouth)  [] Gas    [] Dysosmia (Altered Smell)  [] Thrush  [] Difficulty Chewing    [] Oral Mucositis (Sore Mouth)  [] Fatigue  [x] Hyperglycemia: hba1c 8 7% on 5/27/22; BG nonfasting 157mg/dL on 7/8/22; BG fasting 246mg/dL on 3/14/22  [] Odynophagia  [] Esophagitis  [] Other: hx of low iron    [] Dysphagia  [] Early Satiety  [x] No Problems Eating      Food Allergies & Intolerances: no    Current Diet: Avoiding cold temperatures while on Oxaliplatin and avoids corn and popcorn  Current Nutrition Intake: Unchanged from last visit  Appetite: Good  Nutrition Route: PO  Oral Care: brushes BID  Activity level: ADL, feeling more energy         25 Hr Diet Recall:   Breakfast: corn flakes, yogurt OR eggs OR toast with cream cheese  Lunch: tuna salad OR occasional fast food (has [employer] with him today)   Snack: tortilla chips, humus OR fruit (strawberries or melon)   Dinner: red meat, chicken, or salmon with carrots/string beans/rice      Beverages: water (12oz x2-3), ginger ale (8oz x0-1), Sprite Zero (16oz x1)    Supplements:    Premier Protein Shake (11 oz, 160 kcal, 30 g pro) 1x daily      Oncology Nutrition-Estimated Needs    Flowsheet Row Nutrition from 7/13/2022 in [address] [place] [address] Nutrition from 6/1/2022 in [address] [place] [address]   Weight type used Actual weight Actual weight   Weight in kilograms (kg) used for estimated needs 110 7 kg 108 7 kg   Energy needs formula:  30-35 kcal/kg 30-35 kcal/kg   Energy needs based on 30 kcal/k kcal 3262 kcal   Energy needs based on 35 kcal/k kcal 3805 kcal   Protein needs formula: 1 2-1 5 g/kg 1 2-1 5 g/kg   Protein needs based on 1 2 g/k g 130 g   Protein needs based on 1 5 g/kg 166 g 163 g   Fluid needs formula: 30-35 mL/kg 30-35 mL/kg   Fluid needs based on 30 mL/kg 3327 mL 3258 mL   Fluid needs in ounces 112 oz 110 oz   Fluid needs based on 35 mL/kg 3882 mL 3801 mL   Fluid needs in ounces 131 oz 128 oz           Discussion & Intervention:   Tye Sandifer was evaluated today for an RD follow up regarding wt loss and diet post colostomy  Tye Sandifer is currently undergoing tx for colon cancer  Today Andrzej Orlando explains that he is doing well and is able to eat most foods now  He continues to avoid corn and popcorn  He is also cautious with foods that have skins  He has been tolerating fast foods recently, however states that he only eats these once in a while  Intakes are adequate, and he continues to gain weight  Encouraged him to continue eating well to optimize nutrient intake  Additional discussion included: MNT for: colostomy and mindful eating concepts  Tye Sandifer has no other nutrition questions/concerns today  Moving forward, Tye Sandifer will continue current nutrition plan of care   Materials Provided: not applicable  All questions and concerns addressed during todays visit  Tye Sandifer has RD contact information  Nutrition Diagnosis:    Increased Nutrient Needs (kcal & pro) related to increased demand for nutrients and disease state as evidenced by cancer dx and pt undergoing tx for cancer   Altered GI Function related to tx for cancer as evidenced by s/p colostomy    Monitoring & Evaluation:   Goals:  · weight maintenance/stabilization  · adequate nutrition impact symptom management  · pt to meet >/=75% estimated nutrition needs daily  · adequate glycemic control    · Progress Towards Goals: Progressing    Nutrition Rx & Recommendations:  · Diet: High Calorie, High Protein (for high calorie foods see pages 52-53, and for high protein foods see pages 49-51 in your Eating Hints book)  · Keep a daily food journal (pen/paper, bossman such as [employer])  · Small, frequent meals/snacks may be easier to tolerate than 3 large daily meals  Aim for 5-6 small meals per day (every 2-3 hours)  · Include protein at all meals/snacks  · Include a variety of foods (as tolerated/allowed by diet)  · Stay hydrated by sipping fluids of choice/tolerance throughout the day  · Alter food choices and eating patterns to accommodate changing needs  · Weigh yourself regularly  If you notice weight loss, make an effort to increase your daily food/calorie intake  If you continue to notice loss after these efforts, reach out to your dietitian to establish a plan to stabilize weight  · Caution with gas-producing foods such as broccoli, cabbage, sauerkraut, [place] sprouts, cauliflower, corn, cucumbers, onions, peppers, beans, peas, lentils, apples, apple juice, avocado, and melons  Carbonated drinks, chewing gum, and drinking through a straw can also cause gas  Limiting lactose may help for those who are sensitive to milk products  · Spread carbohydrate intake throughout the day for glycemic control  · Continue Premier Protein daily       Follow Up Plan: During infusion 8/10/22  Recommend Referral to Other Providers: none at this time

## 2022-07-13 NOTE — PROGRESS NOTES
Pt received chemo infusion w/out any adverse effects, labs w/in parameters  Elastometric pump connected & verified running w/2nd RN  Returning on 7/15/22 for d/c   Offers no complaints

## 2022-07-13 NOTE — PATIENT INSTRUCTIONS
Nutrition Rx & Recommendations:  Diet: High Calorie, High Protein (for high calorie foods see pages 52-53, and for high protein foods see pages 49-51 in your Eating Hints book)  Keep a daily food journal (pen/paper, bossman such as [employer])  Small, frequent meals/snacks may be easier to tolerate than 3 large daily meals  Aim for 5-6 small meals per day (every 2-3 hours)  Include protein at all meals/snacks  Include a variety of foods (as tolerated/allowed by diet)  Stay hydrated by sipping fluids of choice/tolerance throughout the day  Alter food choices and eating patterns to accommodate changing needs  Weigh yourself regularly  If you notice weight loss, make an effort to increase your daily food/calorie intake  If you continue to notice loss after these efforts, reach out to your dietitian to establish a plan to stabilize weight  Caution with gas-producing foods such as broccoli, cabbage, sauerkraut, [place] sprouts, cauliflower, corn, cucumbers, onions, peppers, beans, peas, lentils, apples, apple juice, avocado, and melons  Carbonated drinks, chewing gum, and drinking through a straw can also cause gas  Limiting lactose may help for those who are sensitive to milk products  Spread carbohydrate intake throughout the day for glycemic control  Continue Premier Protein daily       Follow Up Plan: During infusion 8/10/22  Recommend Referral to Other Providers: none at this time

## 2022-07-15 ENCOUNTER — HOSPITAL ENCOUNTER (OUTPATIENT)
Dept: INFUSION CENTER | Facility: CLINIC | Age: 57
Discharge: HOME | End: 2022-07-15

## 2022-07-15 DIAGNOSIS — C18.4 MALIGNANT NEOPLASM OF TRANSVERSE COLON (HCC): Primary | ICD-10-CM

## 2022-07-15 DIAGNOSIS — R53.83 OTHER FATIGUE: ICD-10-CM

## 2022-07-18 DIAGNOSIS — C18.4 MALIGNANT NEOPLASM OF TRANSVERSE COLON (HCC): ICD-10-CM

## 2022-07-18 DIAGNOSIS — R53.83 OTHER FATIGUE: Primary | ICD-10-CM

## 2022-07-21 ENCOUNTER — PATIENT OUTREACH (OUTPATIENT)
Dept: HEMATOLOGY ONCOLOGY | Facility: CLINIC | Age: 57
End: 2022-07-21

## 2022-07-21 NOTE — PROGRESS NOTES
Telephone call from patient looking for a more detailed explanation of his CT scan from June 2022  He states Dr Rainer Goncalves said the scan did not indicate the size of the lesion which was possibly just an oversight  Patient wishes to know specifics

## 2022-07-24 LAB
ALBUMIN SERPL-MCNC: 3.5 G/DL (ref 3.8–4.9)
ALBUMIN/GLOB SERPL: 1.5 {RATIO} (ref 1.2–2.2)
ALP SERPL-CCNC: 216 IU/L (ref 44–121)
ALT SERPL-CCNC: 23 IU/L (ref 0–44)
APPEARANCE UR: CLEAR
AST SERPL-CCNC: 34 IU/L (ref 0–40)
BACTERIA URNS QL MICRO: ABNORMAL
BASOPHILS # BLD AUTO: 0 X10E3/UL (ref 0–0.2)
BASOPHILS NFR BLD AUTO: 0 %
BILIRUB SERPL-MCNC: 0.6 MG/DL (ref 0–1.2)
BILIRUB UR QL STRIP: NEGATIVE
BUN SERPL-MCNC: 19 MG/DL (ref 6–24)
BUN/CREAT SERPL: 13 (ref 9–20)
CALCIUM SERPL-MCNC: 8.9 MG/DL (ref 8.7–10.2)
CASTS URNS MICRO: ABNORMAL
CASTS URNS QL MICRO: PRESENT /LPF
CHLORIDE SERPL-SCNC: 104 MMOL/L (ref 96–106)
CO2 SERPL-SCNC: 21 MMOL/L (ref 20–29)
COLOR UR: YELLOW
CREAT SERPL-MCNC: 1.48 MG/DL (ref 0.76–1.27)
EGFR: 55 ML/MIN/1.73
EOSINOPHIL # BLD AUTO: 0.2 X10E3/UL (ref 0–0.4)
EOSINOPHIL NFR BLD AUTO: 2 %
EPI CELLS #/AREA URNS HPF: ABNORMAL /HPF (ref 0–10)
ERYTHROCYTE [DISTWIDTH] IN BLOOD BY AUTOMATED COUNT: 16 % (ref 11.6–15.4)
GLOBULIN SER-MCNC: 2.4 G/DL (ref 1.5–4.5)
GLUCOSE SERPL-MCNC: 179 MG/DL (ref 65–99)
GLUCOSE UR QL: ABNORMAL
HCT VFR BLD AUTO: 36.3 % (ref 37.5–51)
HGB BLD-MCNC: 12.3 G/DL (ref 13–17.7)
HGB UR QL STRIP: NEGATIVE
IMM GRANULOCYTES # BLD: 0.1 X10E3/UL (ref 0–0.1)
IMM GRANULOCYTES NFR BLD: 1 %
KETONES UR QL STRIP: NEGATIVE
LEUKOCYTE ESTERASE UR QL STRIP: NEGATIVE
LYMPHOCYTES # BLD AUTO: 2.9 X10E3/UL (ref 0.7–3.1)
LYMPHOCYTES NFR BLD AUTO: 33 %
MCH RBC QN AUTO: 31.5 PG (ref 26.6–33)
MCHC RBC AUTO-ENTMCNC: 33.9 G/DL (ref 31.5–35.7)
MCV RBC AUTO: 93 FL (ref 79–97)
MICRO URNS: ABNORMAL
MONOCYTES # BLD AUTO: 1.2 X10E3/UL (ref 0.1–0.9)
MONOCYTES NFR BLD AUTO: 14 %
NEUTROPHILS # BLD AUTO: 4.5 X10E3/UL (ref 1.4–7)
NEUTROPHILS NFR BLD AUTO: 50 %
NITRITE UR QL STRIP: NEGATIVE
PH UR STRIP: 5.5 [PH] (ref 5–7.5)
PLATELET # BLD AUTO: 249 X10E3/UL (ref 150–450)
POTASSIUM SERPL-SCNC: 4.2 MMOL/L (ref 3.5–5.2)
PROT SERPL-MCNC: 5.9 G/DL (ref 6–8.5)
PROT UR QL STRIP: ABNORMAL
RBC # BLD AUTO: 3.91 X10E6/UL (ref 4.14–5.8)
RBC #/AREA URNS HPF: ABNORMAL /HPF (ref 0–2)
SODIUM SERPL-SCNC: 138 MMOL/L (ref 134–144)
SP GR UR: 1.02 (ref 1–1.03)
UROBILINOGEN UR STRIP-ACNC: 1 MG/DL (ref 0.2–1)
WBC # BLD AUTO: 8.9 X10E3/UL (ref 3.4–10.8)
WBC #/AREA URNS HPF: ABNORMAL /HPF (ref 0–5)

## 2022-07-26 DIAGNOSIS — E11.9 TYPE 2 DIABETES MELLITUS WITHOUT COMPLICATION, WITHOUT LONG-TERM CURRENT USE OF INSULIN (HCC): ICD-10-CM

## 2022-07-26 RX ORDER — INSULIN GLARGINE 100 [IU]/ML
30 INJECTION, SOLUTION SUBCUTANEOUS DAILY
Qty: 45 ML | Refills: 1 | Status: SHIPPED | OUTPATIENT
Start: 2022-07-26 | End: 2022-09-29

## 2022-07-27 ENCOUNTER — HOSPITAL ENCOUNTER (OUTPATIENT)
Dept: INFUSION CENTER | Facility: CLINIC | Age: 57
Discharge: HOME | End: 2022-07-27
Payer: COMMERCIAL

## 2022-07-27 ENCOUNTER — PATIENT OUTREACH (OUTPATIENT)
Dept: CASE MANAGEMENT | Facility: HOSPITAL | Age: 57
End: 2022-07-27

## 2022-07-27 VITALS
SYSTOLIC BLOOD PRESSURE: 141 MMHG | BODY MASS INDEX: 37.92 KG/M2 | HEIGHT: 67 IN | HEART RATE: 82 BPM | RESPIRATION RATE: 18 BRPM | TEMPERATURE: 97.2 F | DIASTOLIC BLOOD PRESSURE: 86 MMHG | WEIGHT: 241.58 LBS

## 2022-07-27 DIAGNOSIS — R53.83 OTHER FATIGUE: ICD-10-CM

## 2022-07-27 DIAGNOSIS — C18.4 MALIGNANT NEOPLASM OF TRANSVERSE COLON (HCC): Primary | ICD-10-CM

## 2022-07-27 PROCEDURE — 96361 HYDRATE IV INFUSION ADD-ON: CPT

## 2022-07-27 PROCEDURE — 96411 CHEMO IV PUSH ADDL DRUG: CPT

## 2022-07-27 PROCEDURE — 96413 CHEMO IV INFUSION 1 HR: CPT

## 2022-07-27 PROCEDURE — 96368 THER/DIAG CONCURRENT INF: CPT

## 2022-07-27 PROCEDURE — G0498 CHEMO EXTEND IV INFUS W/PUMP: HCPCS

## 2022-07-27 PROCEDURE — 96415 CHEMO IV INFUSION ADDL HR: CPT

## 2022-07-27 PROCEDURE — 96367 TX/PROPH/DG ADDL SEQ IV INF: CPT

## 2022-07-27 RX ORDER — DEXTROSE MONOHYDRATE 50 MG/ML
20 INJECTION, SOLUTION INTRAVENOUS ONCE
Status: COMPLETED | OUTPATIENT
Start: 2022-07-27 | End: 2022-07-27

## 2022-07-27 RX ORDER — SODIUM CHLORIDE 9 MG/ML
20 INJECTION, SOLUTION INTRAVENOUS ONCE
Status: COMPLETED | OUTPATIENT
Start: 2022-07-27 | End: 2022-07-27

## 2022-07-27 RX ORDER — FLUOROURACIL 50 MG/ML
400 INJECTION, SOLUTION INTRAVENOUS ONCE
Status: COMPLETED | OUTPATIENT
Start: 2022-07-27 | End: 2022-07-27

## 2022-07-27 RX ADMIN — OXALIPLATIN 186.15 MG: 5 INJECTION, SOLUTION INTRAVENOUS at 10:38

## 2022-07-27 RX ADMIN — FLUOROURACIL 875 MG: 50 INJECTION, SOLUTION INTRAVENOUS at 12:45

## 2022-07-27 RX ADMIN — DEXTROSE 20 ML/HR: 5 SOLUTION INTRAVENOUS at 10:38

## 2022-07-27 RX ADMIN — SODIUM CHLORIDE 1000 ML: 0.9 INJECTION, SOLUTION INTRAVENOUS at 09:21

## 2022-07-27 RX ADMIN — DEXAMETHASONE SODIUM PHOSPHATE: 10 INJECTION, SOLUTION INTRAMUSCULAR; INTRAVENOUS at 09:18

## 2022-07-27 RX ADMIN — SODIUM CHLORIDE 20 ML/HR: 0.9 INJECTION, SOLUTION INTRAVENOUS at 09:15

## 2022-07-27 RX ADMIN — LEUCOVORIN CALCIUM 876 MG: 350 INJECTION, POWDER, LYOPHILIZED, FOR SOLUTION INTRAMUSCULAR; INTRAVENOUS at 10:38

## 2022-07-27 NOTE — PROGRESS NOTES
Reviewed with Dr Lita Cooney patient urine protein 4+ and also creatinine increased to 1 48 from 1 31  Per Dr Lita Cooney holding [facility] for cycle 10 and also adding 1L NSS to treatment plan  Patient is okay to treat on 7/27/22 as scheduled  Plan updated and Infusion RN notified

## 2022-07-27 NOTE — PROGRESS NOTES
Checked in with pt in the infusion center today, he shared how busy his family has been in the past few weeks between college testing and tours, a few days at the beach, and just general life with teenage kids  He says that he knows he is worn out and needs some time to rest, and plans to spend the next 4-5 days just hanging out at home and trying to catch himself up on rest   He has no immediate needs at this point but was happy for the time spent talking  MSW will remain available to him as needed moving forward

## 2022-07-27 NOTE — PROGRESS NOTES
Pt to clinic for FOLFOX and MVASI treatment with elastometric pump hook up  Spoke with Kapil Lopez, RN regarding patient's elevated creatinine and urine protein, and Dr Vasquez Salinas ordered that Kanwal Manjarrez is to be held today and a fluid bolus given  Patient offers no complaints today  Patient tolerated infusion and pump hook-up without complications  Clamps open to run  AVS declined and patient is aware of next appointment for pump disconnect

## 2022-07-29 ENCOUNTER — HOSPITAL ENCOUNTER (OUTPATIENT)
Dept: INFUSION CENTER | Facility: CLINIC | Age: 57
Discharge: HOME | End: 2022-07-29

## 2022-07-29 DIAGNOSIS — C18.4 MALIGNANT NEOPLASM OF TRANSVERSE COLON (HCC): Primary | ICD-10-CM

## 2022-07-29 DIAGNOSIS — R53.83 OTHER FATIGUE: ICD-10-CM

## 2022-08-01 DIAGNOSIS — R53.83 OTHER FATIGUE: Primary | ICD-10-CM

## 2022-08-01 DIAGNOSIS — C18.4 MALIGNANT NEOPLASM OF TRANSVERSE COLON (HCC): ICD-10-CM

## 2022-08-06 LAB
ALBUMIN SERPL-MCNC: 3.7 G/DL (ref 3.8–4.9)
ALBUMIN SERPL-MCNC: 3.7 G/DL (ref 3.8–4.9)
ALBUMIN/GLOB SERPL: 1.7 {RATIO} (ref 1.2–2.2)
ALBUMIN/GLOB SERPL: 1.9 {RATIO} (ref 1.2–2.2)
ALP SERPL-CCNC: 221 IU/L (ref 44–121)
ALP SERPL-CCNC: 224 IU/L (ref 44–121)
ALT SERPL-CCNC: 22 IU/L (ref 0–44)
ALT SERPL-CCNC: 23 IU/L (ref 0–44)
APPEARANCE UR: CLEAR
AST SERPL-CCNC: 32 IU/L (ref 0–40)
AST SERPL-CCNC: 37 IU/L (ref 0–40)
BACTERIA URNS QL MICRO: NORMAL
BASOPHILS # BLD AUTO: 0 X10E3/UL (ref 0–0.2)
BASOPHILS # BLD AUTO: 0 X10E3/UL (ref 0–0.2)
BASOPHILS NFR BLD AUTO: 0 %
BASOPHILS NFR BLD AUTO: 1 %
BILIRUB SERPL-MCNC: 0.6 MG/DL (ref 0–1.2)
BILIRUB SERPL-MCNC: 0.6 MG/DL (ref 0–1.2)
BILIRUB UR QL STRIP: NEGATIVE
BUN SERPL-MCNC: 20 MG/DL (ref 6–24)
BUN SERPL-MCNC: 21 MG/DL (ref 6–24)
BUN/CREAT SERPL: 16 (ref 9–20)
BUN/CREAT SERPL: 16 (ref 9–20)
CALCIUM SERPL-MCNC: 9 MG/DL (ref 8.7–10.2)
CALCIUM SERPL-MCNC: 9.1 MG/DL (ref 8.7–10.2)
CANCER AG19-9 SERPL-ACNC: 136 U/ML (ref 0–35)
CASTS URNS QL MICRO: NORMAL /LPF
CEA SERPL-MCNC: 69.2 NG/ML (ref 0–4.7)
CHLORIDE SERPL-SCNC: 106 MMOL/L (ref 96–106)
CHLORIDE SERPL-SCNC: 106 MMOL/L (ref 96–106)
CO2 SERPL-SCNC: 20 MMOL/L (ref 20–29)
CO2 SERPL-SCNC: 23 MMOL/L (ref 20–29)
COLOR UR: YELLOW
CREAT SERPL-MCNC: 1.27 MG/DL (ref 0.76–1.27)
CREAT SERPL-MCNC: 1.33 MG/DL (ref 0.76–1.27)
EGFR: 62 ML/MIN/1.73
EGFR: 65 ML/MIN/1.73
EOSINOPHIL # BLD AUTO: 0.2 X10E3/UL (ref 0–0.4)
EOSINOPHIL # BLD AUTO: 0.2 X10E3/UL (ref 0–0.4)
EOSINOPHIL NFR BLD AUTO: 2 %
EOSINOPHIL NFR BLD AUTO: 2 %
EPI CELLS #/AREA URNS HPF: NORMAL /HPF (ref 0–10)
ERYTHROCYTE [DISTWIDTH] IN BLOOD BY AUTOMATED COUNT: 15.2 % (ref 11.6–15.4)
ERYTHROCYTE [DISTWIDTH] IN BLOOD BY AUTOMATED COUNT: 15.3 % (ref 11.6–15.4)
GLOBULIN SER-MCNC: 2 G/DL (ref 1.5–4.5)
GLOBULIN SER-MCNC: 2.2 G/DL (ref 1.5–4.5)
GLUCOSE SERPL-MCNC: 160 MG/DL (ref 65–99)
GLUCOSE SERPL-MCNC: 161 MG/DL (ref 65–99)
GLUCOSE UR QL: ABNORMAL
HCT VFR BLD AUTO: 36.3 % (ref 37.5–51)
HCT VFR BLD AUTO: 36.9 % (ref 37.5–51)
HGB BLD-MCNC: 12.3 G/DL (ref 13–17.7)
HGB BLD-MCNC: 12.4 G/DL (ref 13–17.7)
HGB UR QL STRIP: NEGATIVE
IMM GRANULOCYTES # BLD: 0.1 X10E3/UL (ref 0–0.1)
IMM GRANULOCYTES # BLD: 0.1 X10E3/UL (ref 0–0.1)
IMM GRANULOCYTES NFR BLD: 1 %
IMM GRANULOCYTES NFR BLD: 1 %
KETONES UR QL STRIP: NEGATIVE
LEUKOCYTE ESTERASE UR QL STRIP: NEGATIVE
LYMPHOCYTES # BLD AUTO: 2.2 X10E3/UL (ref 0.7–3.1)
LYMPHOCYTES # BLD AUTO: 2.3 X10E3/UL (ref 0.7–3.1)
LYMPHOCYTES NFR BLD AUTO: 29 %
LYMPHOCYTES NFR BLD AUTO: 30 %
MCH RBC QN AUTO: 31.9 PG (ref 26.6–33)
MCH RBC QN AUTO: 32 PG (ref 26.6–33)
MCHC RBC AUTO-ENTMCNC: 33.6 G/DL (ref 31.5–35.7)
MCHC RBC AUTO-ENTMCNC: 33.9 G/DL (ref 31.5–35.7)
MCV RBC AUTO: 94 FL (ref 79–97)
MCV RBC AUTO: 95 FL (ref 79–97)
MICRO URNS: ABNORMAL
MONOCYTES # BLD AUTO: 0.9 X10E3/UL (ref 0.1–0.9)
MONOCYTES # BLD AUTO: 0.9 X10E3/UL (ref 0.1–0.9)
MONOCYTES NFR BLD AUTO: 11 %
MONOCYTES NFR BLD AUTO: 12 %
NEUTROPHILS # BLD AUTO: 4.2 X10E3/UL (ref 1.4–7)
NEUTROPHILS # BLD AUTO: 4.3 X10E3/UL (ref 1.4–7)
NEUTROPHILS NFR BLD AUTO: 54 %
NEUTROPHILS NFR BLD AUTO: 57 %
NITRITE UR QL STRIP: NEGATIVE
PH UR STRIP: 5.5 [PH] (ref 5–7.5)
PLATELET # BLD AUTO: 253 X10E3/UL (ref 150–450)
PLATELET # BLD AUTO: 256 X10E3/UL (ref 150–450)
POTASSIUM SERPL-SCNC: 3.8 MMOL/L (ref 3.5–5.2)
POTASSIUM SERPL-SCNC: 3.8 MMOL/L (ref 3.5–5.2)
PROT SERPL-MCNC: 5.7 G/DL (ref 6–8.5)
PROT SERPL-MCNC: 5.9 G/DL (ref 6–8.5)
PROT UR QL STRIP: ABNORMAL
RBC # BLD AUTO: 3.85 X10E6/UL (ref 4.14–5.8)
RBC # BLD AUTO: 3.88 X10E6/UL (ref 4.14–5.8)
RBC #/AREA URNS HPF: NORMAL /HPF (ref 0–2)
SODIUM SERPL-SCNC: 140 MMOL/L (ref 134–144)
SODIUM SERPL-SCNC: 141 MMOL/L (ref 134–144)
SP GR UR: 1.02 (ref 1–1.03)
UROBILINOGEN UR STRIP-ACNC: 0.2 MG/DL (ref 0.2–1)
WBC # BLD AUTO: 7.6 X10E3/UL (ref 3.4–10.8)
WBC # BLD AUTO: 7.7 X10E3/UL (ref 3.4–10.8)
WBC #/AREA URNS HPF: NORMAL /HPF (ref 0–5)

## 2022-08-09 ENCOUNTER — OFFICE VISIT (OUTPATIENT)
Dept: INTERNAL MEDICINE CLINIC | Facility: CLINIC | Age: 57
End: 2022-08-09
Payer: COMMERCIAL

## 2022-08-09 VITALS
SYSTOLIC BLOOD PRESSURE: 132 MMHG | HEIGHT: 67 IN | WEIGHT: 243.4 LBS | HEART RATE: 85 BPM | BODY MASS INDEX: 38.2 KG/M2 | RESPIRATION RATE: 16 BRPM | OXYGEN SATURATION: 95 % | TEMPERATURE: 97.5 F | DIASTOLIC BLOOD PRESSURE: 84 MMHG

## 2022-08-09 DIAGNOSIS — E11.9 TYPE 2 DIABETES MELLITUS WITHOUT COMPLICATION, WITH LONG-TERM CURRENT USE OF INSULIN (HCC): Primary | ICD-10-CM

## 2022-08-09 DIAGNOSIS — Z79.4 TYPE 2 DIABETES MELLITUS WITHOUT COMPLICATION, WITH LONG-TERM CURRENT USE OF INSULIN (HCC): Primary | ICD-10-CM

## 2022-08-09 DIAGNOSIS — C18.4 MALIGNANT NEOPLASM OF TRANSVERSE COLON (HCC): Primary | ICD-10-CM

## 2022-08-09 DIAGNOSIS — E78.2 MIXED HYPERLIPIDEMIA: ICD-10-CM

## 2022-08-09 DIAGNOSIS — C78.7 COLON CANCER METASTASIZED TO LIVER (HCC): ICD-10-CM

## 2022-08-09 DIAGNOSIS — C18.9 COLON CANCER METASTASIZED TO LIVER (HCC): ICD-10-CM

## 2022-08-09 DIAGNOSIS — I10 BENIGN ESSENTIAL HYPERTENSION: ICD-10-CM

## 2022-08-09 PROCEDURE — 99214 OFFICE O/P EST MOD 30 MIN: CPT | Performed by: INTERNAL MEDICINE

## 2022-08-09 NOTE — PROGRESS NOTES
INTERNAL MEDICINE OFFICE VISIT  [facility] [address], [address]  Tel: [phone]      NAME: Sebastian Spain  AGE: 61 y o  SEX: male  : 1965   MRN: [id number]    DATE: 2022  TIME: 2:26 PM      Assessment and Plan:  1  Type 2 diabetes mellitus without complication, with long-term current use of insulin (HCC)   continue present medications  - Hemoglobin A1C; Future  - Lipid panel; Future  - TSH, 3rd generation; Future    2  Benign essential hypertension   continue amlodipine and lisinopril    3  Mixed hyperlipidemia   continue atorvastatin    4  Colon cancer metastasized to liver [facility])   follow-up with Oncology, continue chemotherapy      - Counseling Documentation: patient was counseled regarding: diagnostic results, instructions for management, risk factor reductions, prognosis, patient and family education, risks and benefits of treatment options and importance of compliance with treatment  - Medication Side Effects: Adverse side effects of medications were reviewed with the patient/guardian today  Return for follow up visit in  2 months or earlier, if needed  Chief Complaint:  Chief Complaint   Patient presents with    Follow-up     3 month w labs          History of Present Illness:    patient was diagnosed with metastatic colon cancer about 6 months ago and has been going through chemotherapy in order to shrink the tumor so he can get surgery done  He is due for a CT of the abdomen after 2 infusions which will decide when to do the surgery  He is tolerating the chemotherapy very well   His blood pressure is stable    He has not had blood work done for the A1c or cholesterol      Active Problem List:  Patient Active Problem List   Diagnosis    Type 2 diabetes mellitus without complication, with long-term current use of insulin (HCC)    Benign essential hypertension    Mixed hyperlipidemia    Erectile dysfunction  Low testosterone    Obesity (BMI 30-39  9)    Testicular hypogonadism    Incarcerated umbilical hernia    Left ureteral calculus    Type 2 diabetes mellitus with hyperlipidemia (HCC)    Colonic mass    Microcytic anemia    Hypokalemia    Transaminitis    Thrombocytosis    Iron deficiency anemia, unspecified    Malignant neoplasm of transverse colon (HCC)    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)    Colon cancer metastasized to liver (HCC)    [facility]ostomy prolapse ([facility] )    Other fatigue         Past Medical History:  Past Medical History:   Diagnosis Date    Abdominal pain 3/12/2022    [facility] )     [id number] resolved    Diabetes mellitus ([facility] )     Enteritis 08/23/2016    Gastroparesis due to DM [facility] ) 08/23/2016    GERD (gastroesophageal reflux disease)     Hernia, ventral 08/04/2016    Hyperlipidemia     Hypertension     Morbid obesity ([facility] ) 4/17/2018    Postoperative visit 3/2/2022    SIRS (systemic inflammatory response syndrome) ([place] [id number] ) 3/12/2022    Stage 3a chronic kidney disease ([facility] ) 2/19/2022         Past Surgical History:  Past Surgical History:   Procedure Laterality Date    COLOSTOMY N/A 2/20/2022    Procedure: COLOSTOMY LOOP, diverting;  Surgeon: Brandy Tam MD;  Location: [facility];  Service: General    ESOPHAGOGASTRODUODENOSCOPY N/A 8/24/2016    Procedure: ESOPHAGOGASTRODUODENOSCOPY (EGD); Surgeon: Malka Joshi MD;  Location: [facility];   Service:     IR PORT PLACEMENT  3/10/2022    KIDNEY STONE SURGERY      LIVER BIOPSY LAPAROSCOPIC N/A 2/20/2022    Procedure: DIAGNOSTIC LAPAROSCOPIC LIVER BIOPSY, DIVERTING LOOP COLOSTOMY ;  Surgeon: Brandy Tam MD;  [facility]: [facility];  Service: General    TONSILLECTOMY      UMBILICAL HERNIA REPAIR      UMBILICAL HERNIA REPAIR LAPAROSCOPIC N/A 4/26/2019    Procedure: LAPAROSCOPIC UMBILICAL HERNIA REPAIR;  Surgeon: Brandy Tam MD;  [facility]: [facility];  Service: General         Family History:  Family History   Problem Relation Age of Onset    Diabetes Mother         mellitus    Lung cancer Mother     Coronary artery disease Father          Social History:  Social History     Socioeconomic History    Marital status: /Civil Union     Spouse name: None    Number of children: None    Years of education: None    Highest education level: None   Occupational History    Occupation: ACTOR     Employer: [employer]   Tobacco Use    Smoking status: Never Smoker    Smokeless tobacco: Never Used   Vaping Use    Vaping Use: Never used   Substance and Sexual Activity    Alcohol use: Not Currently     Comment: occasion    Drug use: No    Sexual activity: Yes     Partners: Female   Other Topics Concern    None   Social History Narrative    None     Social Determinants of Health     Financial Resource Strain: Not on file   Food Insecurity: No Food Insecurity    Worried About Running Out of Food in the Last Year: Never true    [place] of Food in the Last Year: Never true   Transportation Needs: No Transportation Needs    Lack of Transportation (Medical): No    Lack of Transportation (Non-Medical): No   Physical Activity: Insufficiently Active    Days of Exercise per Week: 5 days    Minutes of Exercise per Session: 10 min   Stress: No Stress Concern Present    Feeling of Stress : Not at all   Social Connections: Not on file   Intimate Partner Violence: Not on file   Housing Stability: Low Risk     Unable to Pay for Housing in the Last Year: No    Number of Places Lived in the Last Year: 1    Unstable Housing in the Last Year: No         Allergies:   Allergies   Allergen Reactions    Shellfish-Derived Products - Food Allergy Anaphylaxis    Erythromycin GI Intolerance     Pt denies         Medications:    Current Outpatient Medications:     amLODIPine (NORVASC) 5 mg tablet, TAKE 1 TABLET BY MOUTH TWICE A DAY, Disp: 180 tablet, Rfl: 0    aspirin 81 MG tablet, Take 81 mg by mouth daily  , Disp: , Rfl:     atorvastatin (LIPITOR) 40 mg tablet, TAKE 1 TABLET BY MOUTH EVERY DAY, Disp: 90 tablet, Rfl: 3    Cholecalciferol (VITAMIN D3 PO), Take 400 Units by mouth daily, Disp: , Rfl:     Continuous Blood Gluc Sensor (FreeStyle Parrish 14 Day Sensor) MISC, Use 1 each every 14 (fourteen) days, Disp: 2 each, Rfl: 6    [START ON 8/10/2022] fluorouracil 5,255 mg in CADD/Elastomeric Infusion Device, Infuse 5,255 mg (1,200 mg/m2/day x 2 19 m2) into a catheter in a vein via infusion device over 46 hours for 2 days  Do not start before August 10, 2022 , Disp: 1 each, Rfl: 0    glyBURIDE-metFORMIN (GLUCOVANCE) 5-500 MG per tablet, Take 1 tablet by mouth daily with breakfast Taking 1 tablet in the morning , Disp: , Rfl:     Insulin Pen Needle (B-D UF III MINI PEN NEEDLES) 31G X 5 MM MISC, Inject under the skin daily, Disp: 100 each, Rfl: 3    Januvia 100 MG tablet, TAKE 1 TABLET BY MOUTH EVERY DAY, Disp: 90 tablet, Rfl: 3    Lantus SoloStar 100 units/mL SOPN, INJECT 30 UNITS UNDER THE SKIN DAILY, Disp: 45 mL, Rfl: 1    lisinopril (ZESTRIL) 40 mg tablet, TAKE 1 TABLET BY MOUTH EVERY DAY, Disp: 90 tablet, Rfl: 3    metoclopramide (REGLAN) 10 mg tablet, TAKE 1 TABLET BY MOUTH TWICE A DAY, Disp: 180 tablet, Rfl: 0    Multiple Vitamins-Minerals (multivitamin with minerals) tablet, Take 1 tablet by mouth daily, Disp: , Rfl:     Needle, Disp, (HYPODERMIC NEEDLE) 23G X 1-1/2" MISC, by Does not apply route once a week, Disp: 10 each, Rfl: 6    ondansetron (ZOFRAN) 4 mg tablet, Take 4 mg by mouth every 6 (six) hours as needed, Disp: , Rfl:     Ostomy Supplies MISC, Use in the morning, Disp: 100 each, Rfl: 3    pantoprazole (PROTONIX) 40 mg tablet, TAKE 1 TABLET BY MOUTH TWICE A DAY, Disp: 180 tablet, Rfl: 3      The following portions of the patient's history were reviewed and updated as appropriate: past medical history, past surgical history, family history, social history, allergies, current medications and active problem list       Review of Systems:  Constitutional: Denies fever, chills, weight gain, weight loss, fatigue  Eyes: Denies eye redness, eye discharge, double vision, change in visual acuity  ENT: Denies hearing loss, tinnitus, sneezing, nasal congestion, nasal discharge, sore throat   Respiratory: Denies cough, expectoration, hemoptysis, shortness of breath, wheezing  Cardiovascular: Denies chest pain, palpitations, lower extremity swelling, orthopnea, PND  Gastrointestinal: Denies abdominal pain, heartburn, nausea, vomiting, hematemesis, diarrhea, bloody stools  Genito-Urinary: Denies dysuria, frequency, difficulty in micturition, nocturia, incontinence  Musculoskeletal: Denies back pain, joint pain, muscle pain  Neurologic: Denies confusion, lightheadedness, syncope, headache, focal weakness, sensory changes, seizures  Endocrine: Denies polyuria, polydipsia, temperature intolerance  Allergy and Immunology: Denies hives, insect bite sensitivity  Hematological and Lymphatic: Denies bleeding problems, swollen glands   Psychological: Denies depression, suicidal ideation, anxiety, panic, mood swings  Dermatological: Denies pruritus, rash, skin lesion changes      Vitals:  Vitals:    08/09/22 1335   BP: 132/84   Pulse: 85   Resp: 16   Temp: 97 5 °F (36 4 °C)   SpO2: 95%       Body mass index is 38 11 kg/m²  Weight (last 2 days)     Date/Time Weight    08/09/22 1335 110 (243 4)            Physical Examination:  General: Patient is not in acute distress  Awake, alert, responding to commands  No weight gain or loss  Head: Normocephalic  Atraumatic  Eyes: Conjunctiva and lids with no swelling, erythema or discharge  Both pupils normal sized, round and reactive to light  Sclera nonicteric  ENT: External examination of nose and ear normal  Otoscopic examination shows translucent tympanic membranes with patent canals without erythema   Oropharynx moist with no erythema, edema, exudate or lesions  Neck: Supple  JVP not raised  Trachea midline  No masses  No thyromegaly  Lungs: No signs of increased work of breathing or respiratory distress  Bilateral bronchovascular breath sounds with no crackles or rhonchi  Chest wall: No tenderness  Cardiovascular: Normal PMI  No thrills  Regular rate and rhythm  S1 and S2 normal  No murmur, rub or gallop  Gastrointestinal: Abdomen soft, nontender  No guarding or rigidity  Liver and spleen not palpable  Bowel sounds present  Neurologic: Cranial nerves II-XII intact   Cortical functions normal  Motor system - Reflexes 2+ and symmetrical  Sensations normal  Musculoskeletal: Gait normal  No joint tenderness  Integumentary: Skin normal with no rash or lesions  Lymphatic: No palpable lymph nodes in neck, axilla or groin  Extremities: No clubbing, cyanosis, edema or varicosities  Psychological: Judgement and insight normal  Mood and affect normal      Laboratory Results:  CBC with diff:   Lab Results   Component Value Date    WBC 7 7 08/05/2022    WBC 7 6 08/05/2022    WBC 13 89 (H) 03/14/2022    WBC 10 8 07/21/2017    RBC 3 85 (L) 08/05/2022    RBC 3 88 (L) 08/05/2022    RBC 3 07 (L) 03/14/2022    HGB 12 3 (L) 08/05/2022    HGB 12 4 (L) 08/05/2022    HGB 7 4 (L) 03/14/2022    HGB 13 6 07/21/2017    HCT 36 3 (L) 08/05/2022    HCT 36 9 (L) 08/05/2022    HCT 24 1 (L) 03/14/2022    HCT 39 3 07/21/2017    MCV 94 08/05/2022    MCV 95 08/05/2022    MCV 69 (L) 03/14/2022    MCV 87 07/21/2017    MCH 31 9 08/05/2022    MCH 32 0 08/05/2022    MCH 23 1 (L) 03/14/2022    MCH 30 2 07/21/2017    RDW 15 2 08/05/2022    RDW 15 3 08/05/2022    RDW 17 9 (H) 03/14/2022    RDW 13 6 07/21/2017     08/05/2022     08/05/2022     (H) 03/14/2022     07/21/2017       CMP:  Lab Results   Component Value Date    CREATININE 1 27 08/05/2022    CREATININE 1 33 (H) 08/05/2022    CREATININE 1 15 03/14/2022    CREATININE 1 18 05/02/2017    BUN 20 08/05/2022    BUN 21 08/05/2022  05/02/2017    K 3 8 08/05/2022    K 3 8 08/05/2022     08/05/2022     08/05/2022    CO2 20 08/05/2022    CO2 23 08/05/2022    GLUCOSE 118 (H) 05/02/2017    PROT 6 2 04/10/2017    ALKPHOS 411 (H) 03/13/2022    ALKPHOS 94 04/10/2017    ALT 22 08/05/2022    ALT 23 08/05/2022    AST 37 08/05/2022    AST 32 08/05/2022       Lab Results   Component Value Date    HGBA1C 8 7 (H) 05/27/2022       Lab Results   Component Value Date    TROPONINI <0 02 01/04/2020       Lipid Profile:   Lab Results   Component Value Date    CHOL 91 (L) 04/10/2017    CHOL 97 (L) 07/18/2016     Lab Results   Component Value Date    HDL 45 08/26/2021    HDL 34 (L) 04/10/2021     Lab Results   Component Value Date    LDLCALC 65 08/26/2021    LDLCALC 55 04/10/2021     Lab Results   Component Value Date    TRIG 75 08/26/2021    TRIG 90 04/10/2021       Imaging Results:  CT chest abdomen pelvis wo contrast  Narrative: CT CHEST, ABDOMEN AND PELVIS WITHOUT IV CONTRAST    INDICATION:   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  COMPARISON:  CT abdomen pelvis 3/12/2022  CT chest 2/19/2022    TECHNIQUE: CT examination of the chest, abdomen and pelvis was performed without intravenous contrast  This examination was performed without intravenous contrast in the context of the critical nationwide Omnipaque shortage  Axial, sagittal, and coronal   2D reformatted images were created from the source data and submitted for interpretation  Radiation dose length product (DLP) for this visit:  1007 mGy-cm   This examination, like all CT scans performed in the [facility], was performed utilizing techniques to minimize radiation dose exposure, including the use of iterative   reconstruction and automated exposure control  Enteric contrast was administered  FINDINGS:    CHEST    LUNGS:  Unchanged or smaller scattered predominantly subpleural bilateral pulmonary nodules   For example, the previously seen dominant 6 mm right upper lobe nodule now measures 4 mm #3/38  A 4 mm subpleural right lower lobe nodule #3/69 is unchanged  A 3   mm lingular nodule #3/64 is unchanged  Multiple other smaller nodules are stable  No new pulmonary findings  No endotracheal or endobronchial lesion  PLEURA:  Unremarkable  HEART/GREAT VESSELS: Heart is unremarkable for patient's age  No thoracic aortic aneurysm  Atherosclerotic calcifications of the coronary arteries  Mitral annulus calcifications  Thin anteroinferior pericardial effusion measuring 5 mm  MEDIASTINUM AND NITA:  Mild diffuse esophageal circumferential thickening  Small hiatal hernia  CHEST WALL AND LOWER NECK:  Right anterior chest wall vascular port remains in place  ABDOMEN    LIVER/BILIARY TREE:  Advanced diffuse hepatic metastatic disease is likely slightly improved from the prior study ; Discrete 5 0 and 3 7 cm hepatic dome masses, #2/50 on today's study had a more conglomerate appearance on the prior study  GALLBLADDER:  There are gallstone(s) within the gallbladder, without pericholecystic inflammatory changes  SPLEEN:  Unchanged splenomegaly with AP length of 14 4 cm  PANCREAS:  Unremarkable  ADRENAL GLANDS:  Unremarkable  KIDNEYS/URETERS:  Unchanged simple right renal cyst     STOMACH AND BOWEL:  Right anterior mid abdominal diverging loop colostomy  No bowel obstruction  Less evident distal transverse colonic mass #2/58  APPENDIX:  No findings to suggest appendicitis  ABDOMINOPELVIC CAVITY:  No ascites  No pneumoperitoneum  No lymphadenopathy  VESSELS:  Unremarkable for patient's age  PELVIS    REPRODUCTIVE ORGANS:  Unremarkable for patient's age  URINARY BLADDER:  Unremarkable  ABDOMINAL WALL/INGUINAL REGIONS:  Right sided colostomy  OSSEOUS STRUCTURES:  No acute fracture or destructive osseous lesion  Impression: Unchanged or smaller pulmonary nodules   The dominant 6 mm right upper lobe nodule seen on the prior study now measures 4 mm  Advanced diffuse hepatic metastatic disease is likely slightly improved from the prior study ; Discrete 5 0 and 3 7 cm hepatic dome masses, #2/50 on today's study had conglomerate appearance on the prior study  Less evident distal transverse colonic mass      Workstation performed: [id number]       Health Maintenance:  Health Maintenance   Topic Date Due    COVID-19 Vaccine (4 - Booster for Moderna series) 01/25/2022    Influenza Vaccine (1) 09/01/2022    Annual Physical  03/07/2023 (Originally 7/28/1983)    Pneumococcal Vaccine: Pediatrics (0 to 5 Years) and At-Risk Patients (6 to 59 Years) (1 - PCV) 03/07/2023 (Originally 7/28/1971)    DTaP,Tdap,and Td Vaccines (1 - Tdap) 03/07/2023 (Originally 7/28/1985)    HEMOGLOBIN A1C  11/27/2022    Depression Screening  03/07/2023    BMI: Followup Plan  05/24/2023    Diabetic Foot Exam  05/24/2023    DM Eye Exam  05/24/2023    BMI: Adult  08/09/2023    HIV Screening  Completed    Hepatitis C Screening  Completed    HIB Vaccine  Aged Out    Hepatitis B Vaccine  Aged Out    IPV Vaccine  Aged Out    Hepatitis A Vaccine  Aged Out    Meningococcal ACWY Vaccine  Aged Out    HPV Vaccine  Aged Out     Immunization History   Administered Date(s) Administered    COVID-19 MODERNA VACC 0 5 ML IM 03/06/2021, 04/03/2021, 10/25/2021    Influenza, seasonal, injectable 10/12/2021         Aliyah Mccoy MD  8/9/2022,2:26 PM

## 2022-08-09 NOTE — PROGRESS NOTES
INTERNAL MEDICINE OFFICE VISIT  [facility]  [address] [facility]  Tel: [phone]      NAME: Sagrario Norman  AGE: 61 y o  SEX: male  : 1965   MRN: [id number]    DATE: 2022  TIME: 2:20 PM      Assessment and Plan:  1  Type 2 diabetes mellitus without complication, with long-term current use of insulin ([facility])  Continue present medications at the same dose  - Hemoglobin A1C; Future  - Lipid panel; Future  - TSH, 3rd generation; Future    2  Benign essential hypertension   continue amlodipine and lisinopril    3  Mixed hyperlipidemia   continue atorvastatin    4  Colon cancer metastasized to liver [facility])   stable, follow-up with oncology and continue chemotherapy  Is scheduled for surgery soon  - Counseling Documentation: patient was counseled regarding: diagnostic results, instructions for management, risk factor reductions, prognosis, patient and family education, risks and benefits of treatment options and importance of compliance with treatment  - Medication Side Effects: Adverse side effects of medications were reviewed with the patient/guardian today  Return for follow up visit in Two months or earlier, if needed  Chief Complaint:  Chief Complaint   Patient presents with    Follow-up     3 month w labs          History of Present Illness:    patient has metastatic colon cancer and is going through chemotherapy to shrink the tumor  Following that he is going for a CT of the abdomen which will decide the timing of his surgery  He is tolerating the chemotherapy very well  Type 2 diabetes is very well controlled    A1c has not been done for a while though  Blood pressure is stable        Active Problem List:  Patient Active Problem List   Diagnosis    Type 2 diabetes mellitus without complication, with long-term current use of insulin ([facility] )    Benign essential hypertension    Mixed hyperlipidemia    Erectile dysfunction    Low testosterone    Obesity (BMI 30-39  9)    Testicular hypogonadism    Incarcerated umbilical hernia    Left ureteral calculus    Type 2 diabetes mellitus with hyperlipidemia (HCC)    Colonic mass    Microcytic anemia    Hypokalemia    Transaminitis    Thrombocytosis    Iron deficiency anemia, unspecified    Malignant neoplasm of transverse colon (HCC)    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)    Colon cancer metastasized to liver (HCC)    Colostomy prolapse ([facility] )    Other fatigue         Past Medical History:  Past Medical History:   Diagnosis Date    Abdominal pain 3/12/2022    [facility]     [id number] resolved    Diabetes mellitus ([facility] )     Enteritis 08/23/2016    Gastroparesis due to DM [facility] ) 08/23/2016    GERD (gastroesophageal reflux disease)     Hernia, ventral 08/04/2016    Hyperlipidemia     Hypertension     Morbid obesity ([facility] ) 4/17/2018    Postoperative visit 3/2/2022    SIRS (systemic inflammatory response syndrome) ([place] [address] ) 3/12/2022    Stage 3a chronic kidney disease ([place] [address] ) 2/19/2022         Past Surgical History:  Past Surgical History:   Procedure Laterality Date    COLOSTOMY N/A 2/20/2022    Procedure: COLOSTOMY LOOP, diverting;  Surgeon: Nan Oh MD;  Location: [facility];  Service: General    ESOPHAGOGASTRODUODENOSCOPY N/A 8/24/2016    Procedure: ESOPHAGOGASTRODUODENOSCOPY (EGD); Surgeon: Celena Lott MD;  Location: [facility];   Service:     IR PORT PLACEMENT  3/10/2022    KIDNEY STONE SURGERY      LIVER BIOPSY LAPAROSCOPIC N/A 2/20/2022    Procedure: DIAGNOSTIC LAPAROSCOPIC LIVER BIOPSY, DIVERTING LOOP COLOSTOMY ;  Surgeon: Nan Oh MD;  [facility];  Service: General    TONSILLECTOMY      UMBILICAL HERNIA REPAIR      UMBILICAL HERNIA REPAIR LAPAROSCOPIC N/A 4/26/2019    Procedure: LAPAROSCOPIC UMBILICAL HERNIA REPAIR;  Surgeon: Nan Oh MD;  [facility];  Service: General Family History:  Family History   Problem Relation Age of Onset    Diabetes Mother         mellitus    Lung cancer Mother     Coronary artery disease Father          Social History:  Social History     Socioeconomic History    Marital status: /Civil Union     Spouse name: None    Number of children: None    Years of education: None    Highest education level: None   Occupational History    Occupation: ACTOR     Employer: [employer]   Tobacco Use    Smoking status: Never Smoker    Smokeless tobacco: Never Used   Vaping Use    Vaping Use: Never used   Substance and Sexual Activity    Alcohol use: Not Currently     Comment: occasion    Drug use: No    Sexual activity: Yes     Partners: Female   Other Topics Concern    None   Social History Narrative    None     Social Determinants of Health     Financial Resource Strain: Not on file   Food Insecurity: No Food Insecurity    Worried About Running Out of Food in the Last Year: Never true    Bibiana of Food in the Last Year: Never true   Transportation Needs: No Transportation Needs    Lack of Transportation (Medical): No    Lack of Transportation (Non-Medical): No   Physical Activity: Insufficiently Active    Days of Exercise per Week: 5 days    Minutes of Exercise per Session: 10 min   Stress: No Stress Concern Present    Feeling of Stress : Not at all   Social Connections: Not on file   Intimate Partner Violence: Not on file   Housing Stability: Low Risk     Unable to Pay for Housing in the Last Year: No    Number of Places Lived in the Last Year: 1    Unstable Housing in the Last Year: No         Allergies:   Allergies   Allergen Reactions    Shellfish-Derived Products - Food Allergy Anaphylaxis    Erythromycin GI Intolerance     Pt denies         Medications:    Current Outpatient Medications:     amLODIPine (NORVASC) 10 mg tablet, Take 1 tablet (10 mg total) by mouth in the morning and 1 tablet (10 mg total) in the evening , Disp: 90 tablet, Rfl: 3    amLODIPine (NORVASC) 5 mg tablet, TAKE 1 TABLET BY MOUTH TWICE A DAY, Disp: 180 tablet, Rfl: 0    aspirin 81 MG tablet, Take 81 mg by mouth daily  , Disp: , Rfl:     atorvastatin (LIPITOR) 40 mg tablet, TAKE 1 TABLET BY MOUTH EVERY DAY, Disp: 90 tablet, Rfl: 3    Cholecalciferol (VITAMIN D3 PO), Take 400 Units by mouth daily, Disp: , Rfl:     Continuous Blood Gluc Sensor ([employer] Parrish 14 Day Sensor) MISC, Use 1 each every 14 (fourteen) days, Disp: 2 each, Rfl: 6    [START ON 8/10/2022] fluorouracil 5,255 mg in CADD/Elastomeric Infusion Device, Infuse 5,255 mg (1,200 mg/m2/day x 2 19 m2) into a catheter in a vein via infusion device over 46 hours for 2 days  Do not start before August 10, 2022 , Disp: 1 each, Rfl: 0    glyBURIDE-metFORMIN (GLUCOVANCE) 5-500 MG per tablet, Take 1 tablet by mouth daily with breakfast Taking 1 tablet in the morning , Disp: , Rfl:     Insulin Pen Needle (B-D UF III MINI PEN NEEDLES) 31G X 5 MM MISC, Inject under the skin daily, Disp: 100 each, Rfl: 3    Januvia 100 MG tablet, TAKE 1 TABLET BY MOUTH EVERY DAY, Disp: 90 tablet, Rfl: 3    Lantus SoloStar 100 units/mL SOPN, INJECT 30 UNITS UNDER THE SKIN DAILY, Disp: 45 mL, Rfl: 1    lisinopril (ZESTRIL) 40 mg tablet, TAKE 1 TABLET BY MOUTH EVERY DAY, Disp: 90 tablet, Rfl: 3    metoclopramide (REGLAN) 10 mg tablet, TAKE 1 TABLET BY MOUTH TWICE A DAY, Disp: 180 tablet, Rfl: 0    Multiple Vitamins-Minerals (multivitamin with minerals) tablet, Take 1 tablet by mouth daily, Disp: , Rfl:     Needle, Disp, (HYPODERMIC NEEDLE) 23G X 1-1/2" MISC, by Does not apply route once a week, Disp: 10 each, Rfl: 6    ondansetron (ZOFRAN) 4 mg tablet, Take 4 mg by mouth every 6 (six) hours as needed, Disp: , Rfl:     Ostomy Supplies MISC, Use in the morning, Disp: 100 each, Rfl: 3    pantoprazole (PROTONIX) 40 mg tablet, TAKE 1 TABLET BY MOUTH TWICE A DAY, Disp: 180 tablet, Rfl: 3      The following portions of the patient's history were reviewed and updated as appropriate: past medical history, past surgical history, family history, social history, allergies, current medications and active problem list       Review of Systems:  Constitutional: Denies fever, chills, weight gain, weight loss, fatigue  Eyes: Denies eye redness, eye discharge, double vision, change in visual acuity  ENT: Denies hearing loss, tinnitus, sneezing, nasal congestion, nasal discharge, sore throat   Respiratory: Denies cough, expectoration, hemoptysis, shortness of breath, wheezing  Cardiovascular: Denies chest pain, palpitations, lower extremity swelling, orthopnea, PND  Gastrointestinal: Denies abdominal pain, heartburn, nausea, vomiting, hematemesis, diarrhea, bloody stools  Genito-Urinary: Denies dysuria, frequency, difficulty in micturition, nocturia, incontinence  Musculoskeletal: Denies back pain, joint pain, muscle pain  Neurologic: Denies confusion, lightheadedness, syncope, headache, focal weakness, sensory changes, seizures  Endocrine: Denies polyuria, polydipsia, temperature intolerance  Allergy and Immunology: Denies hives, insect bite sensitivity  Hematological and Lymphatic: Denies bleeding problems, swollen glands   Psychological: Denies depression, suicidal ideation, anxiety, panic, mood swings  Dermatological: Denies pruritus, rash, skin lesion changes      Vitals:  Vitals:    08/09/22 1335   BP: 132/84   Pulse: 85   Resp: 16   Temp: 97 5 °F (36 4 °C)   SpO2: 95%       Body mass index is 38 11 kg/m²  Weight (last 2 days)     Date/Time Weight    08/09/22 1335 110 (243 4)            Physical Examination:  General: Patient is not in acute distress  Awake, alert, responding to commands  No weight gain or loss  Head: Normocephalic  Atraumatic  Eyes: Conjunctiva and lids with no swelling, erythema or discharge  Both pupils normal sized, round and reactive to light   Sclera nonicteric  ENT: External examination of nose and ear normal  Otoscopic examination shows translucent tympanic membranes with patent canals without erythema  Oropharynx moist with no erythema, edema, exudate or lesions  Neck: Supple  JVP not raised  Trachea midline  No masses  No thyromegaly  Lungs: No signs of increased work of breathing or respiratory distress  Bilateral bronchovascular breath sounds with no crackles or rhonchi  Chest wall: No tenderness  Cardiovascular: Normal PMI  No thrills  Regular rate and rhythm  S1 and S2 normal  No murmur, rub or gallop  Gastrointestinal: Abdomen soft, nontender  No guarding or rigidity  Liver and spleen not palpable  Bowel sounds present  Neurologic: Cranial nerves II-XII intact   Cortical functions normal  Motor system - Reflexes 2+ and symmetrical  Sensations normal  Musculoskeletal: Gait normal  No joint tenderness  Integumentary: Skin normal with no rash or lesions  Lymphatic: No palpable lymph nodes in neck, axilla or groin  Extremities: No clubbing, cyanosis, edema or varicosities  Psychological: Judgement and insight normal  Mood and affect normal      Laboratory Results:  CBC with diff:   Lab Results   Component Value Date    WBC 7 7 08/05/2022    WBC 7 6 08/05/2022    WBC 13 89 (H) 03/14/2022    WBC 10 8 07/21/2017    RBC 3 85 (L) 08/05/2022    RBC 3 88 (L) 08/05/2022    RBC 3 07 (L) 03/14/2022    HGB 12 3 (L) 08/05/2022    HGB 12 4 (L) 08/05/2022    HGB 7 4 (L) 03/14/2022    HGB 13 6 07/21/2017    HCT 36 3 (L) 08/05/2022    HCT 36 9 (L) 08/05/2022    HCT 24 1 (L) 03/14/2022    HCT 39 3 07/21/2017    MCV 94 08/05/2022    MCV 95 08/05/2022    MCV 69 (L) 03/14/2022    MCV 87 07/21/2017    MCH 31 9 08/05/2022    MCH 32 0 08/05/2022    MCH 23 1 (L) 03/14/2022    MCH 30 2 07/21/2017    RDW 15 2 08/05/2022    RDW 15 3 08/05/2022    RDW 17 9 (H) 03/14/2022    RDW 13 6 07/21/2017     08/05/2022     08/05/2022     (H) 03/14/2022     07/21/2017       CMP:  Lab Results   Component Value Date CREATININE 1 27 08/05/2022    CREATININE 1 33 (H) 08/05/2022    CREATININE 1 15 03/14/2022    CREATININE 1 18 05/02/2017    BUN 20 08/05/2022    BUN 21 08/05/2022     05/02/2017    K 3 8 08/05/2022    K 3 8 08/05/2022     08/05/2022     08/05/2022    CO2 20 08/05/2022    CO2 23 08/05/2022    GLUCOSE 118 (H) 05/02/2017    PROT 6 2 04/10/2017    ALKPHOS 411 (H) 03/13/2022    ALKPHOS 94 04/10/2017    ALT 22 08/05/2022    ALT 23 08/05/2022    AST 37 08/05/2022    AST 32 08/05/2022       Lab Results   Component Value Date    HGBA1C 8 7 (H) 05/27/2022       Lab Results   Component Value Date    TROPONINI <0 02 01/04/2020       Lipid Profile:   Lab Results   Component Value Date    CHOL 91 (L) 04/10/2017    CHOL 97 (L) 07/18/2016     Lab Results   Component Value Date    HDL 45 08/26/2021    HDL 34 (L) 04/10/2021     Lab Results   Component Value Date    LDLCALC 65 08/26/2021    LDLCALC 55 04/10/2021     Lab Results   Component Value Date    TRIG 75 08/26/2021    TRIG 90 04/10/2021       Imaging Results:  CT chest abdomen pelvis wo contrast  Narrative: CT CHEST, ABDOMEN AND PELVIS WITHOUT IV CONTRAST    INDICATION:   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  COMPARISON:  CT abdomen pelvis 3/12/2022  CT chest 2/19/2022    TECHNIQUE: CT examination of the chest, abdomen and pelvis was performed without intravenous contrast  This examination was performed without intravenous contrast in the context of the critical nationwide Omnipaque shortage  Axial, sagittal, and coronal   2D reformatted images were created from the source data and submitted for interpretation  Radiation dose length product (DLP) for this visit:  1007 mGy-cm   This examination, like all CT scans performed in the [facility], was performed utilizing techniques to minimize radiation dose exposure, including the use of iterative   reconstruction and automated exposure control  Enteric contrast was administered  FINDINGS:    CHEST    LUNGS:  Unchanged or smaller scattered predominantly subpleural bilateral pulmonary nodules  For example, the previously seen dominant 6 mm right upper lobe nodule now measures 4 mm #3/38  A 4 mm subpleural right lower lobe nodule #3/69 is unchanged  A 3   mm lingular nodule #3/64 is unchanged  Multiple other smaller nodules are stable  No new pulmonary findings  No endotracheal or endobronchial lesion  PLEURA:  Unremarkable  HEART/GREAT VESSELS: Heart is unremarkable for patient's age  No thoracic aortic aneurysm  Atherosclerotic calcifications of the coronary arteries  Mitral annulus calcifications  Thin anteroinferior pericardial effusion measuring 5 mm  MEDIASTINUM AND NITA:  Mild diffuse esophageal circumferential thickening  Small hiatal hernia  CHEST WALL AND LOWER NECK:  Right anterior chest wall vascular port remains in place  ABDOMEN    LIVER/BILIARY TREE:  Advanced diffuse hepatic metastatic disease is likely slightly improved from the prior study ; Discrete 5 0 and 3 7 cm hepatic dome masses, #2/50 on today's study had a more conglomerate appearance on the prior study  GALLBLADDER:  There are gallstone(s) within the gallbladder, without pericholecystic inflammatory changes  SPLEEN:  Unchanged splenomegaly with AP length of 14 4 cm  PANCREAS:  Unremarkable  ADRENAL GLANDS:  Unremarkable  KIDNEYS/URETERS:  Unchanged simple right renal cyst     STOMACH AND BOWEL:  Right anterior mid abdominal diverging loop colostomy  No bowel obstruction  Less evident distal transverse colonic mass #2/58  APPENDIX:  No findings to suggest appendicitis  ABDOMINOPELVIC CAVITY:  No ascites  No pneumoperitoneum  No lymphadenopathy  VESSELS:  Unremarkable for patient's age  PELVIS    REPRODUCTIVE ORGANS:  Unremarkable for patient's age  URINARY BLADDER:  Unremarkable  ABDOMINAL WALL/INGUINAL REGIONS:  Right sided colostomy      OSSEOUS STRUCTURES:  No acute fracture or destructive osseous lesion  Impression: Unchanged or smaller pulmonary nodules  The dominant 6 mm right upper lobe nodule seen on the prior study now measures 4 mm  Advanced diffuse hepatic metastatic disease is likely slightly improved from the prior study ; Discrete 5 0 and 3 7 cm hepatic dome masses, #2/50 on today's study had conglomerate appearance on the prior study  Less evident distal transverse colonic mass  Workstation performed: [id number]       Health Maintenance:  Health Maintenance   Topic Date Due    COVID-19 Vaccine (4 - Booster for Moderna series) 2022    Influenza Vaccine (1) 2022    Annual Physical  2023 (Originally 1983)    Pneumococcal Vaccine: Pediatrics (0 to 5 Years) and At-Risk Patients (6 to 59 Years) (1 - PCV) 2023 (Originally 1970)    DTaP,Tdap,and Td Vaccines (1 - Tdap) 2023 (Originally 1985)    HEMOGLOBIN A1C  2022    Depression Screening  2023    BMI: Followup Plan  2023    Diabetic Foot Exam  2023    DM Eye Exam  2023    BMI: Adult  2023    HIV Screening  Completed    Hepatitis C Screening  Completed    HIB Vaccine  Aged Out    Hepatitis B Vaccine  Aged Out    IPV Vaccine  Aged Out    Hepatitis A Vaccine  Aged Out    Meningococcal ACWY Vaccine  Aged Out    HPV Vaccine  Aged Out     Immunization History   Administered Date(s) Administered    COVID-19 MODERNA VACC 0 5 ML IM 2021, 2021, 10/25/2021    Influenza, seasonal, injectable 10/12/2021         Nitza Bullard MD  [id number] [facility] of BEHAVIORAL MEDICINE AT [address]  Tel: [phone]      NAME: Demetrio Zamora  AGE: 61 y o  SEX: male  : 1965   MRN: [id number]    DATE: 2022  TIME: 1:54 PM      Assessment and Plan:  1   Type 2 diabetes mellitus without complication, with long-term current use of insulin (HCC)  ***    2  Benign essential hypertension  ***    3  Mixed hyperlipidemia  ***    4  Colon cancer metastasized to liver (HCC)  ***      [id number]      Return for follow up visit in *** or earlier, if needed  Chief Complaint:  Chief Complaint   Patient presents with    Follow-up     3 month w labs          History of Present Illness:   ***      Active Problem List:  Patient Active Problem List   Diagnosis    Type 2 diabetes mellitus without complication, with long-term current use of insulin ([address] )    Benign essential hypertension    Mixed hyperlipidemia    Erectile dysfunction    Low testosterone    Obesity (BMI 30-39  9)    Testicular hypogonadism    Incarcerated umbilical hernia    Left ureteral calculus    Type 2 diabetes mellitus with hyperlipidemia (HCC)    Colonic mass    Microcytic anemia    Hypokalemia    Transaminitis    Thrombocytosis    Iron deficiency anemia, unspecified    Malignant neoplasm of transverse colon (HCC)    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)    Colon cancer metastasized to liver (HCC)    Colostomy prolapse ([address] )    Other fatigue         Past Medical History:  Past Medical History:   Diagnosis Date    Abdominal pain 3/12/2022    Acute renal failure ([address] )     [id number] resolved    Diabetes mellitus ([address] )     Enteritis 08/23/2016    Gastroparesis due to DM ([address] ) 08/23/2016    GERD (gastroesophageal reflux disease)     Hernia, ventral 08/04/2016    Hyperlipidemia     Hypertension     Morbid obesity ([address] ) 4/17/2018    Postoperative visit 3/2/2022    SIRS (systemic inflammatory response syndrome) ([facility] 3/12/2022    Stage 3a chronic kidney disease ([facility] ) 2/19/2022         Past Surgical History:  Past Surgical History:   Procedure Laterality Date    COLOSTOMY N/A 2/20/2022    Procedure: COLOSTOMY LOOP, diverting;  Surgeon: Nicki Fong MD;  [facility];  Service: [facility] ESOPHAGOGASTRODUODENOSCOPY N/A 8/24/2016    Procedure: ESOPHAGOGASTRODUODENOSCOPY (EGD); Surgeon: Timmy Simmons MD;  Location: [facility]; Service:     IR PORT PLACEMENT  3/10/2022    KIDNEY STONE SURGERY      LIVER BIOPSY LAPAROSCOPIC N/A 2/20/2022    Procedure: DIAGNOSTIC LAPAROSCOPIC LIVER BIOPSY, DIVERTING LOOP COLOSTOMY ;  Surgeon: Joe Bradley MD;  [facility];  Service: General    TONSILLECTOMY      UMBILICAL HERNIA REPAIR      UMBILICAL HERNIA REPAIR LAPAROSCOPIC N/A 4/26/2019    Procedure: LAPAROSCOPIC UMBILICAL HERNIA REPAIR;  Surgeon: Joe Bradley MD;  [facility];  Service: General         Family History:  Family History   Problem Relation Age of Onset    Diabetes Mother         mellitus    Lung cancer Mother     Coronary artery disease Father          Social History:  Social History     Socioeconomic History    Marital status: /Civil Union     Spouse name: None    Number of children: None    Years of education: None    Highest education level: None   Occupational History    Occupation: ACTOR     Employer: [employer]   Tobacco Use    Smoking status: Never Smoker    Smokeless tobacco: Never Used   Vaping Use    Vaping Use: Never used   Substance and Sexual Activity    Alcohol use: Not Currently     Comment: occasion    Drug use: No    Sexual activity: Yes     Partners: Female   Other Topics Concern    None   Social History Narrative    None     Social Determinants of Health     Financial Resource Strain: Not on file   Food Insecurity: No Food Insecurity    Worried About Running Out of Food in the Last Year: Never true    Bibiana of Food in the Last Year: Never true   Transportation Needs: No Transportation Needs    Lack of Transportation (Medical): No    Lack of Transportation (Non-Medical):  No   Physical Activity: Insufficiently Active    Days of Exercise per Week: 5 days    Minutes of Exercise per Session: 10 min   Stress: No Stress Concern Present    Feeling of Stress : Not at all   Social Connections: Not on file   Intimate Partner Violence: Not on file   Housing Stability: Low Risk     Unable to Pay for Housing in the Last Year: No    Number of Places Lived in the Last Year: 1    Unstable Housing in the Last Year: No         Allergies: Allergies   Allergen Reactions    Shellfish-Derived Products - Food Allergy Anaphylaxis    Erythromycin GI Intolerance     Pt denies         Medications:    Current Outpatient Medications:     amLODIPine (NORVASC) 10 mg tablet, Take 1 tablet (10 mg total) by mouth in the morning and 1 tablet (10 mg total) in the evening , Disp: 90 tablet, Rfl: 3    amLODIPine (NORVASC) 5 mg tablet, TAKE 1 TABLET BY MOUTH TWICE A DAY, Disp: 180 tablet, Rfl: 0    aspirin 81 MG tablet, Take 81 mg by mouth daily  , Disp: , Rfl:     atorvastatin (LIPITOR) 40 mg tablet, TAKE 1 TABLET BY MOUTH EVERY DAY, Disp: 90 tablet, Rfl: 3    Cholecalciferol (VITAMIN D3 PO), Take 400 Units by mouth daily, Disp: , Rfl:     Continuous Blood Gluc Sensor ([employer] Parrish 14 Day Sensor) MISC, Use 1 each every 14 (fourteen) days, Disp: 2 each, Rfl: 6    [START ON 8/10/2022] fluorouracil 5,255 mg in CADD/Elastomeric Infusion Device, Infuse 5,255 mg (1,200 mg/m2/day x 2 19 m2) into a catheter in a vein via infusion device over 46 hours for 2 days  Do not start before August 10, 2022 , Disp: 1 each, Rfl: 0    glyBURIDE-metFORMIN (GLUCOVANCE) 5-500 MG per tablet, Take 1 tablet by mouth daily with breakfast Taking 1 tablet in the morning , Disp: , Rfl:     Insulin Pen Needle (B-D UF III MINI PEN NEEDLES) 31G X 5 MM MISC, Inject under the skin daily, Disp: 100 each, Rfl: 3    Januvia 100 MG tablet, TAKE 1 TABLET BY MOUTH EVERY DAY, Disp: 90 tablet, Rfl: 3    Lantus SoloStar 100 units/mL SOPN, INJECT 30 UNITS UNDER THE SKIN DAILY, Disp: 45 mL, Rfl: 1    lisinopril (ZESTRIL) 40 mg tablet, TAKE 1 TABLET BY MOUTH EVERY DAY, Disp: 90 tablet, Rfl: 3    metoclopramide (REGLAN) 10 mg tablet, TAKE 1 TABLET BY MOUTH TWICE A DAY, Disp: 180 tablet, Rfl: 0    Multiple Vitamins-Minerals (multivitamin with minerals) tablet, Take 1 tablet by mouth daily, Disp: , Rfl:     Needle, Disp, (HYPODERMIC NEEDLE) 23G X 1-1/2" MISC, by Does not apply route once a week, Disp: 10 each, Rfl: 6    ondansetron (ZOFRAN) 4 mg tablet, Take 4 mg by mouth every 6 (six) hours as needed, Disp: , Rfl:     Ostomy Supplies MISC, Use in the morning, Disp: 100 each, Rfl: 3    pantoprazole (PROTONIX) 40 mg tablet, TAKE 1 TABLET BY MOUTH TWICE A DAY, Disp: 180 tablet, Rfl: 3      The following portions of the patient's history were reviewed and updated as appropriate: past medical history, past surgical history, family history, social history, allergies, current medications and active problem list       Review of Systems:  Constitutional: Denies fever, chills, weight gain, weight loss, fatigue  Eyes: Denies eye redness, eye discharge, double vision, change in visual acuity  ENT: Denies hearing loss, tinnitus, sneezing, nasal congestion, nasal discharge, sore throat   Respiratory: Denies cough, expectoration, hemoptysis, shortness of breath, wheezing  Cardiovascular: Denies chest pain, palpitations, lower extremity swelling, orthopnea, PND  Gastrointestinal: Denies abdominal pain, heartburn, nausea, vomiting, hematemesis, diarrhea, bloody stools  Genito-Urinary: Denies dysuria, frequency, difficulty in micturition, nocturia, incontinence  Musculoskeletal: Denies back pain, joint pain, muscle pain  Neurologic: Denies confusion, lightheadedness, syncope, headache, focal weakness, sensory changes, seizures  Endocrine: Denies polyuria, polydipsia, temperature intolerance  Allergy and Immunology: Denies hives, insect bite sensitivity  Hematological and Lymphatic: Denies bleeding problems, swollen glands   Psychological: Denies depression, suicidal ideation, anxiety, panic, mood swings  Dermatological: Denies pruritus, rash, skin lesion changes      Vitals:  Vitals:    08/09/22 1335   BP: 132/84   Pulse: 85   Resp: 16   Temp: 97 5 °F (36 4 °C)   SpO2: 95%       Body mass index is 38 11 kg/m²  Weight (last 2 days)     Date/Time Weight    08/09/22 1335 110 (243 4)            Physical Examination:  General: Patient is not in acute distress  Awake, alert, responding to commands  No weight gain or loss  Head: Normocephalic  Atraumatic  Eyes: Conjunctiva and lids with no swelling, erythema or discharge  Both pupils normal sized, round and reactive to light  Sclera nonicteric  ENT: External examination of nose and ear normal  Otoscopic examination shows translucent tympanic membranes with patent canals without erythema  Oropharynx moist with no erythema, edema, exudate or lesions  Neck: Supple  JVP not raised  Trachea midline  No masses  No thyromegaly  Lungs: No signs of increased work of breathing or respiratory distress  Bilateral bronchovascular breath sounds with no crackles or rhonchi  Chest wall: No tenderness  Cardiovascular: Normal PMI  No thrills  Regular rate and rhythm  S1 and S2 normal  No murmur, rub or gallop  Gastrointestinal: Abdomen soft, nontender  No guarding or rigidity  Liver and spleen not palpable  Bowel sounds present  Neurologic: Cranial nerves II-XII intact   Cortical functions normal  Motor system - Reflexes 2+ and symmetrical  Sensations normal  Musculoskeletal: Gait normal  No joint tenderness  Integumentary: Skin normal with no rash or lesions  Lymphatic: No palpable lymph nodes in neck, axilla or groin  Extremities: No clubbing, cyanosis, edema or varicosities  Psychological: Judgement and insight normal  Mood and affect normal      Laboratory Results:  CBC with diff:   Lab Results   Component Value Date    WBC 7 7 08/05/2022    WBC 7 6 08/05/2022    WBC 13 89 (H) 03/14/2022    WBC 10 8 07/21/2017    RBC 3 85 (L) 08/05/2022    RBC 3 88 (L) 08/05/2022    RBC 3 07 (L) 03/14/2022    HGB 12 3 (L) 08/05/2022    HGB 12 4 (L) 08/05/2022    HGB 7 4 (L) 03/14/2022    HGB 13 6 07/21/2017    HCT 36 3 (L) 08/05/2022    HCT 36 9 (L) 08/05/2022    HCT 24 1 (L) 03/14/2022    HCT 39 3 07/21/2017    MCV 94 08/05/2022    MCV 95 08/05/2022    MCV 69 (L) 03/14/2022    MCV 87 07/21/2017    MCH 31 9 08/05/2022    MCH 32 0 08/05/2022    MCH 23 1 (L) 03/14/2022    MCH 30 2 07/21/2017    RDW 15 2 08/05/2022    RDW 15 3 08/05/2022    RDW 17 9 (H) 03/14/2022    RDW 13 6 07/21/2017     08/05/2022     08/05/2022     (H) 03/14/2022     07/21/2017       CMP:  Lab Results   Component Value Date    CREATININE 1 27 08/05/2022    CREATININE 1 33 (H) 08/05/2022    CREATININE 1 15 03/14/2022    CREATININE 1 18 05/02/2017    BUN 20 08/05/2022    BUN 21 08/05/2022     05/02/2017    K 3 8 08/05/2022    K 3 8 08/05/2022     08/05/2022     08/05/2022    CO2 20 08/05/2022    CO2 23 08/05/2022    GLUCOSE 118 (H) 05/02/2017    PROT 6 2 04/10/2017    ALKPHOS 411 (H) 03/13/2022    ALKPHOS 94 04/10/2017    ALT 22 08/05/2022    ALT 23 08/05/2022    AST 37 08/05/2022    AST 32 08/05/2022       Lab Results   Component Value Date    HGBA1C 8 7 (H) 05/27/2022       Lab Results   Component Value Date    TROPONINI <0 02 01/04/2020       Lipid Profile:   Lab Results   Component Value Date    CHOL 91 (L) 04/10/2017    CHOL 97 (L) 07/18/2016     Lab Results   Component Value Date    HDL 45 08/26/2021    HDL 34 (L) 04/10/2021     Lab Results   Component Value Date    LDLCALC 65 08/26/2021    LDLCALC 55 04/10/2021     Lab Results   Component Value Date    TRIG 75 08/26/2021    TRIG 90 04/10/2021       Imaging Results:  CT chest abdomen pelvis wo contrast  Narrative: CT CHEST, ABDOMEN AND PELVIS WITHOUT IV CONTRAST    INDICATION:   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  COMPARISON:  CT abdomen pelvis 3/12/2022   CT chest 2/19/2022    TECHNIQUE: CT examination of the chest, abdomen and pelvis was performed without intravenous contrast  This examination was performed without intravenous contrast in the context of the critical nationwide Omnipaque shortage  Axial, sagittal, and coronal   2D reformatted images were created from the source data and submitted for interpretation  Radiation dose length product (DLP) for this visit:  1007 mGy-cm   This examination, like all CT scans performed in the [facility], was performed utilizing techniques to minimize radiation dose exposure, including the use of iterative   reconstruction and automated exposure control  Enteric contrast was administered  FINDINGS:    CHEST    LUNGS:  Unchanged or smaller scattered predominantly subpleural bilateral pulmonary nodules  For example, the previously seen dominant 6 mm right upper lobe nodule now measures 4 mm #3/38  A 4 mm subpleural right lower lobe nodule #3/69 is unchanged  A 3   mm lingular nodule #3/64 is unchanged  Multiple other smaller nodules are stable  No new pulmonary findings  No endotracheal or endobronchial lesion  PLEURA:  Unremarkable  HEART/GREAT VESSELS: Heart is unremarkable for patient's age  No thoracic aortic aneurysm  Atherosclerotic calcifications of the coronary arteries  Mitral annulus calcifications  Thin anteroinferior pericardial effusion measuring 5 mm  MEDIASTINUM AND NITA:  Mild diffuse esophageal circumferential thickening  Small hiatal hernia  CHEST WALL AND LOWER NECK:  Right anterior chest wall vascular port remains in place  ABDOMEN    LIVER/BILIARY TREE:  Advanced diffuse hepatic metastatic disease is likely slightly improved from the prior study ; Discrete 5 0 and 3 7 cm hepatic dome masses, #2/50 on today's study had a more conglomerate appearance on the prior study  GALLBLADDER:  There are gallstone(s) within the gallbladder, without pericholecystic inflammatory changes      SPLEEN:  Unchanged splenomegaly with AP length of 14 4 cm  PANCREAS:  Unremarkable  ADRENAL GLANDS:  Unremarkable  KIDNEYS/URETERS:  Unchanged simple right renal cyst     STOMACH AND BOWEL:  Right anterior mid abdominal diverging loop colostomy  No bowel obstruction  Less evident distal transverse colonic mass #2/58  APPENDIX:  No findings to suggest appendicitis  ABDOMINOPELVIC CAVITY:  No ascites  No pneumoperitoneum  No lymphadenopathy  VESSELS:  Unremarkable for patient's age  PELVIS    REPRODUCTIVE ORGANS:  Unremarkable for patient's age  URINARY BLADDER:  Unremarkable  ABDOMINAL WALL/INGUINAL REGIONS:  Right sided colostomy  OSSEOUS STRUCTURES:  No acute fracture or destructive osseous lesion  Impression: Unchanged or smaller pulmonary nodules  The dominant 6 mm right upper lobe nodule seen on the prior study now measures 4 mm  Advanced diffuse hepatic metastatic disease is likely slightly improved from the prior study ; Discrete 5 0 and 3 7 cm hepatic dome masses, #2/50 on today's study had conglomerate appearance on the prior study  Less evident distal transverse colonic mass      Workstation performed: [id number]       Health Maintenance:  Health Maintenance   Topic Date Due    COVID-19 Vaccine (4 - Booster for Moderna series) 01/25/2022    Influenza Vaccine (1) 09/01/2022    Annual Physical  03/07/2023 (Originally 7/28/1983)    Pneumococcal Vaccine: Pediatrics (0 to 5 Years) and At-Risk Patients (6 to 59 Years) (1 - PCV) 03/07/2023 (Originally 7/28/1971)    DTaP,Tdap,and Td Vaccines (1 - Tdap) 03/07/2023 (Originally [birth year]/28/1986)    HEMOGLOBIN A1C  11/27/2022    Depression Screening  03/07/2023    BMI: Followup Plan  05/24/2023    Diabetic Foot Exam  05/24/2023    DM Eye Exam  05/24/2023    BMI: Adult  07/27/2023    HIV Screening  Completed    Hepatitis C Screening  Completed    HIB Vaccine  Aged Out    Hepatitis B Vaccine  Aged Out    IPV Vaccine  Aged Out    Hepatitis A Vaccine  Aged C/ [place] Meningococcal ACWY Vaccine  Aged Out    HPV Vaccine  Aged Out     Immunization History   Administered Date(s) Administered    COVID-19 MODERNA VACC 0 5 ML IM 03/06/2021, 04/03/2021, 10/25/2021    Influenza, seasonal, injectable 10/12/2021         Chayo Bell MD  8/9/2022,1:54 PM

## 2022-08-10 ENCOUNTER — HOSPITAL ENCOUNTER (OUTPATIENT)
Dept: INFUSION CENTER | Facility: CLINIC | Age: 57
Discharge: HOME | End: 2022-08-10
Payer: COMMERCIAL

## 2022-08-10 ENCOUNTER — NUTRITION (OUTPATIENT)
Dept: NUTRITION | Facility: CLINIC | Age: 57
End: 2022-08-10

## 2022-08-10 VITALS
HEART RATE: 81 BPM | HEIGHT: 67 IN | RESPIRATION RATE: 18 BRPM | BODY MASS INDEX: 38.24 KG/M2 | WEIGHT: 243.6 LBS | DIASTOLIC BLOOD PRESSURE: 81 MMHG | TEMPERATURE: 97.5 F | SYSTOLIC BLOOD PRESSURE: 131 MMHG | OXYGEN SATURATION: 97 %

## 2022-08-10 DIAGNOSIS — R53.83 OTHER FATIGUE: ICD-10-CM

## 2022-08-10 DIAGNOSIS — Z71.3 NUTRITIONAL COUNSELING: Primary | ICD-10-CM

## 2022-08-10 DIAGNOSIS — C18.4 MALIGNANT NEOPLASM OF TRANSVERSE COLON (HCC): Primary | ICD-10-CM

## 2022-08-10 PROCEDURE — 96368 THER/DIAG CONCURRENT INF: CPT

## 2022-08-10 PROCEDURE — 96415 CHEMO IV INFUSION ADDL HR: CPT

## 2022-08-10 PROCEDURE — G0498 CHEMO EXTEND IV INFUS W/PUMP: HCPCS

## 2022-08-10 PROCEDURE — 96413 CHEMO IV INFUSION 1 HR: CPT

## 2022-08-10 PROCEDURE — 96367 TX/PROPH/DG ADDL SEQ IV INF: CPT

## 2022-08-10 PROCEDURE — 96411 CHEMO IV PUSH ADDL DRUG: CPT

## 2022-08-10 RX ORDER — SODIUM CHLORIDE 9 MG/ML
20 INJECTION, SOLUTION INTRAVENOUS ONCE
Status: COMPLETED | OUTPATIENT
Start: 2022-08-10 | End: 2022-08-10

## 2022-08-10 RX ORDER — DEXTROSE MONOHYDRATE 50 MG/ML
20 INJECTION, SOLUTION INTRAVENOUS ONCE
Status: COMPLETED | OUTPATIENT
Start: 2022-08-10 | End: 2022-08-10

## 2022-08-10 RX ORDER — FLUOROURACIL 50 MG/ML
400 INJECTION, SOLUTION INTRAVENOUS ONCE
Status: COMPLETED | OUTPATIENT
Start: 2022-08-10 | End: 2022-08-10

## 2022-08-10 RX ADMIN — SODIUM CHLORIDE 20 ML/HR: 0.9 INJECTION, SOLUTION INTRAVENOUS at 09:18

## 2022-08-10 RX ADMIN — OXALIPLATIN 186.15 MG: 5 INJECTION, SOLUTION INTRAVENOUS at 11:15

## 2022-08-10 RX ADMIN — FLUOROURACIL 875 MG: 50 INJECTION, SOLUTION INTRAVENOUS at 13:23

## 2022-08-10 RX ADMIN — DEXAMETHASONE SODIUM PHOSPHATE: 10 INJECTION, SOLUTION INTRAMUSCULAR; INTRAVENOUS at 09:17

## 2022-08-10 RX ADMIN — DEXTROSE 20 ML/HR: 5 SOLUTION INTRAVENOUS at 11:04

## 2022-08-10 RX ADMIN — LEUCOVORIN CALCIUM 876 MG: 350 INJECTION, POWDER, LYOPHILIZED, FOR SOLUTION INTRAMUSCULAR; INTRAVENOUS at 11:15

## 2022-08-10 NOTE — PROGRESS NOTES
Received notification in regards to patient protein +3 in urine on 8/5/22  Reviewed with Dr Jair Thorpe  Proceed with treatment and hold [facility]ASI  Infusion RN notified and communication added to plan

## 2022-08-10 NOTE — PROGRESS NOTES
Outpatient Oncology Nutrition Consultation   Type of Consult: Follow Up  Care Location: Infusion Center    Reason for referral: From Infusion RN on 3/23/22 (reason for referral: Malnutrition Screening Tool (MST) Triggers: scored a 4 indicating >/=34# (>/=15 4 kg) recent wt loss and is eating poorly due to a decreased appetite  Date of MST: 3/23/22 )    Nutrition Assessment:   Oncology Diagnosis & Treatments: Diagnosed with colon cancer 3/1/22  · S/p colostomy 2/20/22  · Began chemotherapy (adrucil, oxaliplatin, mvasi) 3/23/22      Oncology History   Malignant neoplasm of transverse colon ([facility] [address] )   3/1/2022 Initial Diagnosis    Malignant neoplasm of transverse colon ([facility] [address] )     3/23/2022 -  Chemotherapy    fluorouracil (ADRUCIL), 400 mg/m2 = 905 mg, Intravenous, Once, 11 of 14 cycles  Administration: 905 mg (3/23/2022), 875 mg (4/6/2022), 875 mg (4/20/2022), 870 mg (5/4/2022), 880 mg (5/18/2022), 875 mg (6/1/2022), 875 mg (6/14/2022), 875 mg (6/29/2022), 875 mg (7/13/2022), 875 mg (7/27/2022)  leucovorin calcium IVPB, 904 mg, Intravenous, Once, 11 of 14 cycles  Administration: 900 mg (3/23/2022), 876 mg (4/6/2022), 876 mg (4/20/2022), 868 mg (5/4/2022), 880 mg (5/18/2022), 876 mg (6/1/2022), 876 mg (6/14/2022), 876 mg (6/29/2022), 876 mg (7/13/2022), 876 mg (7/27/2022)  oxaliplatin (ELOXATIN) chemo infusion, 85 mg/m2 = 192 1 mg, Intravenous, Once, 11 of 14 cycles  Administration: 192 1 mg (3/23/2022), 186 15 mg (4/6/2022), 186 15 mg (4/20/2022), 184 45 mg (5/4/2022), 187 mg (5/18/2022), 186 15 mg (6/1/2022), 186 15 mg (6/14/2022), 186 15 mg (6/29/2022), 186 15 mg (7/13/2022), 186 15 mg (7/27/2022)  fluorouracil (ADRUCIL) ambulatory infusion Soln, 1,200 mg/m2/day = 5,425 mg, Intravenous, Over 46 hours, 11 of 14 cycles  bevacizumab-awwb (MVASI) IVPB, 5 mg/kg = 545 mg (100 % of original dose 5 mg/kg), Intravenous, Once, 10 of 13 cycles  Dose modification: 5 mg/kg (original dose 5 mg/kg, Cycle 1)  Administration: 545 mg (3/23/2022), 500 mg (4/6/2022), 500 mg (4/20/2022), 500 mg (5/4/2022), 540 mg (5/18/2022), 535 mg (6/1/2022), 545 mg (6/14/2022), 545 mg (6/29/2022), 545 mg (7/13/2022)       Past Medical & Surgical Hx: T2DM, gastroparesis, HLD, CKD, GERD  Patient Active Problem List   Diagnosis    Type 2 diabetes mellitus without complication, with long-term current use of insulin (HCC)    Benign essential hypertension    Mixed hyperlipidemia    Erectile dysfunction    Low testosterone    Obesity (BMI 30-39  9)    Testicular hypogonadism    Incarcerated umbilical hernia    Left ureteral calculus    Type 2 diabetes mellitus with hyperlipidemia (HCC)    Colonic mass    Microcytic anemia    Hypokalemia    Transaminitis    Thrombocytosis    Iron deficiency anemia, unspecified    Malignant neoplasm of transverse colon (HCC)    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)    Colon cancer metastasized to liver (HCC)    Colostomy prolapse (HCC)    Other fatigue     Past Medical History:   Diagnosis Date    Abdominal pain 3/12/2022    Acute renal failure ([facility] [address] )     [id number] resolved    Diabetes mellitus ([facility] [address] )     Enteritis 08/23/2016    Gastroparesis due to DM ([facility] [address] ) 08/23/2016    GERD (gastroesophageal reflux disease)     Hernia, ventral 08/04/2016    Hyperlipidemia     Hypertension     Morbid obesity ([facility] [address] ) 4/17/2018    Postoperative visit 3/2/2022    SIRS (systemic inflammatory response syndrome) ([facility] [address] ) 3/12/2022    Stage 3a chronic kidney disease ([facility] [address] ) 2/19/2022     Past Surgical History:   Procedure Laterality Date    COLOSTOMY N/A 2/20/2022    Procedure: COLOSTOMY LOOP, diverting;  Surgeon: Nicki Fong MD;  [facility]: [facility];  Service: General    ESOPHAGOGASTRODUODENOSCOPY N/A 8/24/2016    Procedure: ESOPHAGOGASTRODUODENOSCOPY (EGD); Surgeon: Saba Delgado MD;  [facility]: [facility];   Service:     IR PORT PLACEMENT  3/10/2022    KIDNEY STONE SURGERY      LIVER BIOPSY LAPAROSCOPIC N/A 2/20/2022    Procedure: DIAGNOSTIC LAPAROSCOPIC LIVER BIOPSY, DIVERTING LOOP COLOSTOMY ;  Surgeon: Luz Maria Kang MD;  [facility];  Service: General    TONSILLECTOMY      UMBILICAL HERNIA REPAIR      UMBILICAL HERNIA REPAIR LAPAROSCOPIC N/A 4/26/2019    Procedure: LAPAROSCOPIC UMBILICAL HERNIA REPAIR;  Surgeon: Luz Maria Kang MD;  [facility];  Service: General       Review of Medications:   Vitamins, Supplements and Herbals: Med List Reviewed & pt is only taking: vitamin D 400IU    Current Outpatient Medications:     amLODIPine (NORVASC) 5 mg tablet, TAKE 1 TABLET BY MOUTH TWICE A DAY, Disp: 180 tablet, Rfl: 0    aspirin 81 MG tablet, Take 81 mg by mouth daily  , Disp: , Rfl:     atorvastatin (LIPITOR) 40 mg tablet, TAKE 1 TABLET BY MOUTH EVERY DAY, Disp: 90 tablet, Rfl: 3    Cholecalciferol (VITAMIN D3 PO), Take 400 Units by mouth daily, Disp: , Rfl:     Continuous Blood Gluc Sensor (Scour PreventionStyle Parrish 14 Day Sensor) MISC, Use 1 each every 14 (fourteen) days, Disp: 2 each, Rfl: 6    fluorouracil 5,255 mg in CADD/Elastomeric Infusion Device, Infuse 5,255 mg (1,200 mg/m2/day x 2 19 m2) into a catheter in a vein via infusion device over 46 hours for 2 days  Do not start before August 10, 2022 , Disp: 1 each, Rfl: 0    glyBURIDE-metFORMIN (GLUCOVANCE) 5-500 MG per tablet, Take 1 tablet by mouth daily with breakfast Taking 1 tablet in the morning , Disp: , Rfl:     Insulin Pen Needle (B-D UF III MINI PEN NEEDLES) 31G X 5 MM MISC, Inject under the skin daily, Disp: 100 each, Rfl: 3    Januvia 100 MG tablet, TAKE 1 TABLET BY MOUTH EVERY DAY, Disp: 90 tablet, Rfl: 3    Lantus SoloStar 100 units/mL SOPN, INJECT 30 UNITS UNDER THE SKIN DAILY, Disp: 45 mL, Rfl: 1    lisinopril (ZESTRIL) 40 mg tablet, TAKE 1 TABLET BY MOUTH EVERY DAY, Disp: 90 tablet, Rfl: 3    metoclopramide (REGLAN) 10 mg tablet, TAKE 1 TABLET BY MOUTH TWICE A DAY, Disp: 180 tablet, Rfl: 0    Multiple Vitamins-Minerals (multivitamin with minerals) tablet, Take 1 tablet by mouth daily, Disp: , Rfl:     Needle, Disp, (HYPODERMIC NEEDLE) 23G X 1-1/2" MISC, by Does not apply route once a week, Disp: 10 each, Rfl: 6    ondansetron (ZOFRAN) 4 mg tablet, Take 4 mg by mouth every 6 (six) hours as needed, Disp: , Rfl:     Ostomy Supplies MISC, Use in the morning, Disp: 100 each, Rfl: 3    pantoprazole (PROTONIX) 40 mg tablet, TAKE 1 TABLET BY MOUTH TWICE A DAY, Disp: 180 tablet, Rfl: 3  No current facility-administered medications for this visit      Facility-Administered Medications Ordered in Other Visits:     bevacizumab-awwb (MVASI) 550 mg in sodium chloride 0 9 % 100 mL IVPB, 5 mg/kg (Treatment Plan Recorded), Intravenous, Once, Liam Sampson MD    dextrose 5 % infusion, 20 mL/hr, Intravenous, Once, Liam Sampson MD    fluorouracil (ADRUCIL) injection 875 mg, 400 mg/m2 (Treatment Plan Recorded), Intravenous, Once, Liam Sampson MD    leucovorin 876 mg in dextrose 5 % 250 mL IVPB, 400 mg/m2 (Treatment Plan Recorded), Intravenous, Once, Liam Sampson MD    ondansetron (ZOFRAN) 16 mg, dexamethasone (DECADRON) 10 mg in sodium chloride 0 9 % 50 mL IVPB, , Intravenous, Once, Liam Sampson MD, Last Rate: 150 mL/hr at 08/10/22 0917, New Bag at 08/10/22 0917    oxaliplatin (ELOXATIN) 186 15 mg in dextrose 5 % 250 mL chemo infusion, 85 mg/m2 (Treatment Plan Recorded), Intravenous, Once, Liam Sampson MD    Most Recent Lab Results: Checks BG at home: 90-100mg/dL in the AM, ~200mg/dL in the evening   Lab Results   Component Value Date    WBC 7 7 08/05/2022    WBC 7 6 08/05/2022    NEUTROABS 4 2 08/05/2022    NEUTROABS 4 3 08/05/2022    IRON 26 (L) 02/20/2022    TIBC 235 (L) 02/20/2022    FERRITIN 125 02/20/2022    CHOLESTEROL 125 08/26/2021    CHOL 91 (L) 04/10/2017    TRIG 75 08/26/2021    HDL 45 08/26/2021    LDLCALC 65 08/26/2021    ALT 22 08/05/2022    ALT 23 08/05/2022    AST 37 08/05/2022    AST 32 08/05/2022    ALB 3 7 (L) 08/05/2022    ALB 3 7 (L) 08/05/2022     05/02/2017     04/10/2017    SODIUM 141 08/05/2022    SODIUM 140 08/05/2022    K 3 8 08/05/2022    K 3 8 08/05/2022     08/05/2022     08/05/2022    BUN 20 08/05/2022    BUN 21 08/05/2022    CREATININE 1 27 08/05/2022    CREATININE 1 33 (H) 08/05/2022    EGFR 65 08/05/2022    EGFR 62 08/05/2022    TSH 0 719 08/26/2021    GLUCOSE 118 (H) 05/02/2017    POCGLU 309 (H) 03/14/2022    GLUF 246 (H) 03/14/2022    GLUC 160 (H) 08/05/2022    GLUC 161 (H) 08/05/2022    HGBA1C 8 7 (H) 05/27/2022    HGBA1C 7 5 (H) 02/10/2022    HGBA1C 8 0 (H) 08/26/2021    CALCIUM 7 9 (L) 03/14/2022       Anthropometric Measurements:   Height: 68"  Ht Readings from Last 1 Encounters:   08/10/22 5' 7 01" (1 702 m)     Wt Readings from Last 27 Encounters:   08/10/22 110 kg (243 lb 9 6 oz)   08/09/22 110 kg (243 lb 6 4 oz)   07/27/22 110 kg (241 lb 9 3 oz)   07/13/22 110 kg (243 lb 9 6 oz)   06/29/22 110 kg (243 lb)   06/15/22 109 kg (241 lb)   06/14/22 110 kg (241 lb 12 8 oz)   06/01/22 109 kg (239 lb 3 2 oz)   05/27/22 108 kg (237 lb)   05/24/22 108 kg (237 lb 6 4 oz)   05/18/22 107 kg (236 lb 9 6 oz)   05/06/22 108 kg (237 lb)   05/04/22 101 kg (223 lb 5 2 oz)   04/28/22 106 kg (232 lb 9 6 oz)   04/20/22 104 kg (229 lb 3 2 oz)   04/06/22 104 kg (228 lb 6 4 oz)   04/04/22 103 kg (227 lb)   03/23/22 105 kg (231 lb 11 3 oz)   03/18/22 105 kg (231 lb 3 2 oz)   03/17/22 105 kg (232 lb)   03/14/22 110 kg (241 lb 10 oz)   03/10/22 108 kg (239 lb)   03/07/22 109 kg (239 lb 6 4 oz)   03/02/22 108 kg (239 lb)   03/01/22 109 kg (240 lb)   02/18/22 133 kg (293 lb 3 4 oz)   03/23/21 133 kg (292 lb 9 6 oz)     Weight History:    Usual Weight: 290#   Varian: n/a   Home Scale: none     Oncology Nutrition-Anthropometrics    Flowsheet Row Nutrition from 8/10/2022 in [address] [place] [address] Nutrition from 7/13/2022 in [facility] [place]   Patient age (years): 61 years 61 years   Patient (male) height (in): 76 in 76 in   Current weight (lbs): 243 6 lbs 243 6 lbs   Current weight to be used for anthropometric calculations (kg) 110 7 kg 110 7 kg   BMI: 37 37   IBW male 154 lb 154 lb   IBW (kg) male 70 kg 70 kg   IBW % (male) 158 2 % 158 2 %   Adjusted BW (male): 176 4 lbs 176 4 lbs   Adjusted BW in kg (male): 80 2 kg 80 2 kg   % weight change after 1 month: 0 % 1 1 %   Weight change after 1 month (lbs) 0 lbs 2 6 lbs   % weight change after 3 months: 2 8 % 6 3 %   Weight change after 3 months (lbs) 6 6 lbs 14 4 lbs   % weight change after 6 months: -16 9 % --   Weight change after 6 months (lbs) -49 6 lbs --          Nutrition-Focused Physical Findings: none observed    Food/Nutrition-Related History & Client/Social History:    Current Nutrition Impact Symptoms:  [] Nausea  [] Reduced Appetite  [] Acid Reflux    [] Vomiting  [] Unintended Wt Loss  [] Malabsorption    [] Diarrhea  [] Unintended Wt Gain  [] Dumping Syndrome    [] Constipation  [] Thick Mucous/Secretions  [] Abdominal Pain    [] Dysgeusia (Altered Taste)  [] Xerostomia (Dry Mouth)  [] Gas    [] Dysosmia (Altered Smell)  [] Thrush  [] Difficulty Chewing    [] Oral Mucositis (Sore Mouth)  [] Fatigue  [x] Hyperglycemia: hba1c 8 7% on 5/27/22; BG nonfasting 160mg/dL on 8/5/22; BG fasting 246mg/dL on 3/14/22  [] Odynophagia  [] Esophagitis  [] Other: hx of low iron    [] Dysphagia  [] Early Satiety  [x] No Problems Eating      Food Allergies & Intolerances: no    Current Diet: Avoiding cold temperatures while on Oxaliplatin and avoids corn and popcorn  Current Nutrition Intake: Unchanged from last visit  Appetite: Good  Nutrition Route: PO  Oral Care: brushes BID  Activity level: ADL, feeling more energy         24 Hr Diet Recall:   Breakfast: cereal (corn flakes) OR eggs OR toast with cream cheese OR yogurt   Lunch: tuna salad OR occasional fast food (has [employer] with him today) Snack: tortilla chips with humus OR fruit (strawberries or melon)   Dinner: red meat, chicken, or salmon with rice and vegetables (carrots, string beans)    Beverages: water (12oz x2-3), ginger ale (8oz x0-1), Sprite Zero (16oz x0-1)    Supplements:    Premier Protein Shake (11 oz, 160 kcal, 30 g pro) 1x daily      Oncology Nutrition-Estimated Needs    Flowsheet [facility] Nutrition from 8/10/2022 in [address] [place] [address] Nutrition from 2022 in [address] [place] [address]   Weight type used Actual weight Actual weight   Weight in kilograms (kg) used for estimated needs -- 110 7 kg   Weight in kilograms (kg) used for estimated needs 110 7 kg --   Energy needs formula:  35-40 kcal/kg 30-35 kcal/kg   Energy needs based on 30 kcal/kg: -- 3322 kcal   Energy needs based on 35 kcal/kg: -- 3875 kcal   Energy needs based on 35 kcal/k kcal --   Energy needs based on 40 kcal/k kcal --   Protein needs formula: 1 5-2 g/kg 1 2-1 5 g/kg   Protein needs based on 1 2 g/kg: -- 133 g   Protein needs based on 1 5 g/kg -- 166 g   Protein needs based on 1 5 g/kg 166 g --   Protein needs based on 2 g/kg 221 g --   Fluid needs formula: 30-35 mL/kg 30-35 mL/kg   Fluid needs based on 30 mL/kg 3327 mL 3327 mL   Fluid needs in ounces 112 oz 112 oz   Fluid needs based on 35 mL/kg 3882 mL 3882 mL   Fluid needs in ounces 131 oz 131 oz           Discussion & Intervention:   Erasto Oliveira was evaluated today for an RD follow up regarding wt loss and diet post colostomy  Erasto Oliveira is currently undergoing tx for colon cancer  Today Melissa Stanton explains that he is doing well and is able to eat without difficulty  He continues to be cautious with some foods  For example, he tried [place] slaw this week but did not have a lot as he wants to avoid any potential blockages with his colostomy  Intakes are adequate, and weight is stable x1 month  However, he does have significant weight loss over the past 6 months  Encouraged him to continue eating well to optimize nutrient intake  Uday Pena has no nutrition questions/concerns today  Moving forward, Uday Pena will continue current nutrition plan of care   Materials Provided: not applicable  All questions and concerns addressed during todays visit  Uday Pena has RD contact information  Nutrition Diagnosis:    Increased Nutrient Needs (kcal & pro) related to increased demand for nutrients and disease state as evidenced by cancer dx and pt undergoing tx for cancer   Altered GI Function related to tx for cancer as evidenced by s/p colostomy   Increased Nutrient Needs (kcal & pro) related to increased demand for nutrients as evidenced by significant unintentional weight loss over 6 months  Monitoring & Evaluation:   Goals:  · weight maintenance/stabilization  · adequate nutrition impact symptom management  · pt to meet >/=75% estimated nutrition needs daily  · adequate glycemic control    · Progress Towards Goals: Progressing    Nutrition Rx & Recommendations:  · Diet: High Calorie, High Protein (for high calorie foods see pages 52-53, and for high protein foods see pages 49-51 in your Eating Hints book)  · Keep a daily food journal (pen/paper, bossman such as [employer] Vit[employer])  · Small, frequent meals/snacks may be easier to tolerate than 3 large daily meals  Aim for 5-6 small meals per day (every 2-3 hours)  · Include protein at all meals/snacks  · Include a variety of foods (as tolerated/allowed by diet)  · Stay hydrated by sipping fluids of choice/tolerance throughout the day  · Alter food choices and eating patterns to accommodate changing needs  · Weigh yourself regularly  If you notice weight loss, make an effort to increase your daily food/calorie intake  If you continue to notice loss after these efforts, reach out to your dietitian to establish a plan to stabilize weight     · Caution with gas-producing foods such as broccoli, cabbage, sauerkraut, [facility] [facility] sprouts, cauliflower, corn, cucumbers, onions, peppers, beans, peas, lentils, apples, apple juice, avocado, and melons  Carbonated drinks, chewing gum, and drinking through a straw can also cause gas  Limiting lactose may help for those who are sensitive to milk products  · Spread carbohydrate intake throughout the day for glycemic control  · Continue Premier Protein daily       Follow Up Plan: During infusion 9/7/22  Recommend Referral to Other Providers: none at this time

## 2022-08-10 NOTE — PROGRESS NOTES
Pt presents for FOLFOX and MVASI infusion, offers no complaints, Danish Lawson RN notified regarding pt's concern about urine protein level of +3  Pt to continue with FOLFOX treatment but hold MVASI  Per Dr Gordo Rangel continue to hold and monitor urine   5FU elastometic connected by Chrystal Sanabria RN and double checked by Maggie Vazquez RN, declined AVS, aware of appointment time for pump disconnect, d/c from unit stable

## 2022-08-10 NOTE — PATIENT INSTRUCTIONS
Nutrition Rx & Recommendations:  Diet: High Calorie, High Protein (for high calorie foods see pages 52-53, and for high protein foods see pages 49-51 in your Eating Hints book)  Keep a daily food journal (pen/paper, bossman such as [email or web address])  Small, frequent meals/snacks may be easier to tolerate than 3 large daily meals  Aim for 5-6 small meals per day (every 2-3 hours)  Include protein at all meals/snacks  Include a variety of foods (as tolerated/allowed by diet)  Stay hydrated by sipping fluids of choice/tolerance throughout the day  Alter food choices and eating patterns to accommodate changing needs  Weigh yourself regularly  If you notice weight loss, make an effort to increase your daily food/calorie intake  If you continue to notice loss after these efforts, reach out to your dietitian to establish a plan to stabilize weight  Caution with gas-producing foods such as broccoli, cabbage, sauerkraut, [place] sprouts, cauliflower, corn, cucumbers, onions, peppers, beans, peas, lentils, apples, apple juice, avocado, and melons  Carbonated drinks, chewing gum, and drinking through a straw can also cause gas  Limiting lactose may help for those who are sensitive to milk products  Spread carbohydrate intake throughout the day for glycemic control  Continue Premier Protein daily       Follow Up Plan: During infusion 9/7/22  Recommend Referral to Other Providers: none at this time

## 2022-08-12 ENCOUNTER — HOSPITAL ENCOUNTER (OUTPATIENT)
Dept: INFUSION CENTER | Facility: CLINIC | Age: 57
Discharge: HOME | End: 2022-08-12

## 2022-08-12 DIAGNOSIS — C18.4 MALIGNANT NEOPLASM OF TRANSVERSE COLON (HCC): Primary | ICD-10-CM

## 2022-08-12 DIAGNOSIS — R53.83 OTHER FATIGUE: ICD-10-CM

## 2022-08-12 NOTE — PROGRESS NOTES
Pt here today for cadd pump D/C, denies any discomforts  Elastomeric pump appears deflated  Good blood return noted via port    Pt aware of next appointment, declines AVS

## 2022-08-16 DIAGNOSIS — C18.4 MALIGNANT NEOPLASM OF TRANSVERSE COLON (HCC): ICD-10-CM

## 2022-08-16 DIAGNOSIS — R53.83 OTHER FATIGUE: Primary | ICD-10-CM

## 2022-08-20 LAB
ALBUMIN SERPL-MCNC: 3.6 G/DL (ref 3.8–4.9)
ALBUMIN SERPL-MCNC: 3.7 G/DL (ref 3.8–4.9)
ALBUMIN/GLOB SERPL: 1.6 {RATIO} (ref 1.2–2.2)
ALBUMIN/GLOB SERPL: 1.7 {RATIO} (ref 1.2–2.2)
ALP SERPL-CCNC: 196 IU/L (ref 44–121)
ALP SERPL-CCNC: 196 IU/L (ref 44–121)
ALT SERPL-CCNC: 16 IU/L (ref 0–44)
ALT SERPL-CCNC: 19 IU/L (ref 0–44)
APPEARANCE UR: CLEAR
AST SERPL-CCNC: 28 IU/L (ref 0–40)
AST SERPL-CCNC: 31 IU/L (ref 0–40)
BACTERIA URNS QL MICRO: NORMAL
BASOPHILS # BLD AUTO: 0 X10E3/UL (ref 0–0.2)
BASOPHILS # BLD AUTO: 0.1 X10E3/UL (ref 0–0.2)
BASOPHILS NFR BLD AUTO: 0 %
BASOPHILS NFR BLD AUTO: 1 %
BILIRUB SERPL-MCNC: 0.7 MG/DL (ref 0–1.2)
BILIRUB SERPL-MCNC: 0.7 MG/DL (ref 0–1.2)
BILIRUB UR QL STRIP: NEGATIVE
BUN SERPL-MCNC: 19 MG/DL (ref 6–24)
BUN SERPL-MCNC: 20 MG/DL (ref 6–24)
BUN/CREAT SERPL: 14 (ref 9–20)
BUN/CREAT SERPL: 15 (ref 9–20)
CALCIUM SERPL-MCNC: 9 MG/DL (ref 8.7–10.2)
CALCIUM SERPL-MCNC: 9.1 MG/DL (ref 8.7–10.2)
CASTS URNS QL MICRO: NORMAL /LPF
CHLORIDE SERPL-SCNC: 104 MMOL/L (ref 96–106)
CHLORIDE SERPL-SCNC: 106 MMOL/L (ref 96–106)
CHOLEST SERPL-MCNC: 155 MG/DL (ref 100–199)
CO2 SERPL-SCNC: 20 MMOL/L (ref 20–29)
CO2 SERPL-SCNC: 21 MMOL/L (ref 20–29)
COLOR UR: YELLOW
CREAT SERPL-MCNC: 1.3 MG/DL (ref 0.76–1.27)
CREAT SERPL-MCNC: 1.39 MG/DL (ref 0.76–1.27)
EGFR: 59 ML/MIN/1.73
EGFR: 64 ML/MIN/1.73
EOSINOPHIL # BLD AUTO: 0 X10E3/UL (ref 0–0.4)
EOSINOPHIL # BLD AUTO: 0.2 X10E3/UL (ref 0–0.4)
EOSINOPHIL NFR BLD AUTO: 0 %
EOSINOPHIL NFR BLD AUTO: 2 %
EPI CELLS #/AREA URNS HPF: NORMAL /HPF (ref 0–10)
ERYTHROCYTE [DISTWIDTH] IN BLOOD BY AUTOMATED COUNT: 14.3 % (ref 11.6–15.4)
ERYTHROCYTE [DISTWIDTH] IN BLOOD BY AUTOMATED COUNT: 14.6 % (ref 11.6–15.4)
GLOBULIN SER-MCNC: 2.2 G/DL (ref 1.5–4.5)
GLOBULIN SER-MCNC: 2.2 G/DL (ref 1.5–4.5)
GLUCOSE SERPL-MCNC: 123 MG/DL (ref 65–99)
GLUCOSE SERPL-MCNC: 129 MG/DL (ref 65–99)
GLUCOSE UR QL: ABNORMAL
HBA1C MFR BLD: 8.4 % (ref 4.8–5.6)
HCT VFR BLD AUTO: 34.5 % (ref 37.5–51)
HCT VFR BLD AUTO: 35.9 % (ref 37.5–51)
HDLC SERPL-MCNC: 51 MG/DL
HGB BLD-MCNC: 12 G/DL (ref 13–17.7)
HGB BLD-MCNC: 12.3 G/DL (ref 13–17.7)
HGB UR QL STRIP: NEGATIVE
IMM GRANULOCYTES # BLD: 0.1 X10E3/UL (ref 0–0.1)
IMM GRANULOCYTES # BLD: 0.1 X10E3/UL (ref 0–0.1)
IMM GRANULOCYTES NFR BLD: 1 %
IMM GRANULOCYTES NFR BLD: 1 %
KETONES UR QL STRIP: NEGATIVE
LDLC SERPL CALC-MCNC: 77 MG/DL (ref 0–99)
LEUKOCYTE ESTERASE UR QL STRIP: NEGATIVE
LYMPHOCYTES # BLD AUTO: 2 X10E3/UL (ref 0.7–3.1)
LYMPHOCYTES # BLD AUTO: 2 X10E3/UL (ref 0.7–3.1)
LYMPHOCYTES NFR BLD AUTO: 25 %
LYMPHOCYTES NFR BLD AUTO: 26 %
MCH RBC QN AUTO: 32.4 PG (ref 26.6–33)
MCH RBC QN AUTO: 32.7 PG (ref 26.6–33)
MCHC RBC AUTO-ENTMCNC: 34.3 G/DL (ref 31.5–35.7)
MCHC RBC AUTO-ENTMCNC: 34.8 G/DL (ref 31.5–35.7)
MCV RBC AUTO: 94 FL (ref 79–97)
MCV RBC AUTO: 95 FL (ref 79–97)
MICRO URNS: ABNORMAL
MONOCYTES # BLD AUTO: 0.8 X10E3/UL (ref 0.1–0.9)
MONOCYTES # BLD AUTO: 0.8 X10E3/UL (ref 0.1–0.9)
MONOCYTES NFR BLD AUTO: 10 %
MONOCYTES NFR BLD AUTO: 10 %
NEUTROPHILS # BLD AUTO: 4.7 X10E3/UL (ref 1.4–7)
NEUTROPHILS # BLD AUTO: 4.8 X10E3/UL (ref 1.4–7)
NEUTROPHILS NFR BLD AUTO: 61 %
NEUTROPHILS NFR BLD AUTO: 63 %
NITRITE UR QL STRIP: NEGATIVE
PH UR STRIP: 5.5 [PH] (ref 5–7.5)
PLATELET # BLD AUTO: 223 X10E3/UL (ref 150–450)
PLATELET # BLD AUTO: 235 X10E3/UL (ref 150–450)
POTASSIUM SERPL-SCNC: 3.7 MMOL/L (ref 3.5–5.2)
POTASSIUM SERPL-SCNC: 3.8 MMOL/L (ref 3.5–5.2)
PROT SERPL-MCNC: 5.8 G/DL (ref 6–8.5)
PROT SERPL-MCNC: 5.9 G/DL (ref 6–8.5)
PROT UR QL STRIP: ABNORMAL
RBC # BLD AUTO: 3.67 X10E6/UL (ref 4.14–5.8)
RBC # BLD AUTO: 3.8 X10E6/UL (ref 4.14–5.8)
RBC #/AREA URNS HPF: NORMAL /HPF (ref 0–2)
SL AMB VLDL CHOLESTEROL CALC: 27 MG/DL (ref 5–40)
SODIUM SERPL-SCNC: 138 MMOL/L (ref 134–144)
SODIUM SERPL-SCNC: 140 MMOL/L (ref 134–144)
SP GR UR: 1.01 (ref 1–1.03)
TRIGL SERPL-MCNC: 157 MG/DL (ref 0–149)
TSH SERPL DL<=0.005 MIU/L-ACNC: 0.96 UIU/ML (ref 0.45–4.5)
UROBILINOGEN UR STRIP-ACNC: 0.2 MG/DL (ref 0.2–1)
WBC # BLD AUTO: 7.6 X10E3/UL (ref 3.4–10.8)
WBC # BLD AUTO: 7.9 X10E3/UL (ref 3.4–10.8)
WBC #/AREA URNS HPF: NORMAL /HPF (ref 0–5)

## 2022-08-20 PROCEDURE — 3052F HG A1C>EQUAL 8.0%<EQUAL 9.0%: CPT | Performed by: INTERNAL MEDICINE

## 2022-08-23 ENCOUNTER — HOSPITAL ENCOUNTER (OUTPATIENT)
Dept: CT IMAGING | Facility: HOSPITAL | Age: 57
Discharge: HOME | End: 2022-08-23
Attending: INTERNAL MEDICINE
Payer: COMMERCIAL

## 2022-08-23 DIAGNOSIS — C18.4 MALIGNANT NEOPLASM OF TRANSVERSE COLON (HCC): ICD-10-CM

## 2022-08-23 PROCEDURE — G1004 CDSM NDSC: HCPCS

## 2022-08-23 PROCEDURE — 71260 CT THORAX DX C+: CPT

## 2022-08-23 PROCEDURE — 74177 CT ABD & PELVIS W/CONTRAST: CPT

## 2022-08-23 RX ADMIN — IOHEXOL 59 ML: 350 INJECTION, SOLUTION INTRAVENOUS at 16:25

## 2022-08-24 ENCOUNTER — HOSPITAL ENCOUNTER (OUTPATIENT)
Dept: INFUSION CENTER | Facility: CLINIC | Age: 57
Discharge: HOME | End: 2022-08-24
Payer: COMMERCIAL

## 2022-08-24 VITALS
TEMPERATURE: 97.6 F | WEIGHT: 241.2 LBS | DIASTOLIC BLOOD PRESSURE: 78 MMHG | SYSTOLIC BLOOD PRESSURE: 140 MMHG | RESPIRATION RATE: 18 BRPM | BODY MASS INDEX: 37.86 KG/M2 | HEIGHT: 67 IN

## 2022-08-24 DIAGNOSIS — R53.83 OTHER FATIGUE: ICD-10-CM

## 2022-08-24 DIAGNOSIS — C18.4 MALIGNANT NEOPLASM OF TRANSVERSE COLON (HCC): Primary | ICD-10-CM

## 2022-08-24 PROCEDURE — G0498 CHEMO EXTEND IV INFUS W/PUMP: HCPCS

## 2022-08-24 PROCEDURE — 96368 THER/DIAG CONCURRENT INF: CPT

## 2022-08-24 PROCEDURE — 96411 CHEMO IV PUSH ADDL DRUG: CPT

## 2022-08-24 PROCEDURE — 96413 CHEMO IV INFUSION 1 HR: CPT

## 2022-08-24 PROCEDURE — 96415 CHEMO IV INFUSION ADDL HR: CPT

## 2022-08-24 PROCEDURE — 96367 TX/PROPH/DG ADDL SEQ IV INF: CPT

## 2022-08-24 RX ORDER — FLUOROURACIL 50 MG/ML
400 INJECTION, SOLUTION INTRAVENOUS ONCE
Status: COMPLETED | OUTPATIENT
Start: 2022-08-24 | End: 2022-08-24

## 2022-08-24 RX ORDER — SODIUM CHLORIDE 9 MG/ML
20 INJECTION, SOLUTION INTRAVENOUS ONCE
Status: COMPLETED | OUTPATIENT
Start: 2022-08-24 | End: 2022-08-24

## 2022-08-24 RX ORDER — DEXTROSE MONOHYDRATE 50 MG/ML
20 INJECTION, SOLUTION INTRAVENOUS ONCE
Status: COMPLETED | OUTPATIENT
Start: 2022-08-24 | End: 2022-08-24

## 2022-08-24 RX ADMIN — DEXAMETHASONE SODIUM PHOSPHATE: 10 INJECTION, SOLUTION INTRAMUSCULAR; INTRAVENOUS at 09:48

## 2022-08-24 RX ADMIN — SODIUM CHLORIDE 20 ML/HR: 9 INJECTION, SOLUTION INTRAVENOUS at 09:48

## 2022-08-24 RX ADMIN — LEUCOVORIN CALCIUM 876 MG: 350 INJECTION, POWDER, LYOPHILIZED, FOR SOLUTION INTRAMUSCULAR; INTRAVENOUS at 10:25

## 2022-08-24 RX ADMIN — FLUOROURACIL 875 MG: 50 INJECTION, SOLUTION INTRAVENOUS at 12:33

## 2022-08-24 RX ADMIN — DEXTROSE 20 ML/HR: 5 SOLUTION INTRAVENOUS at 10:19

## 2022-08-24 RX ADMIN — OXALIPLATIN 186.15 MG: 5 INJECTION, SOLUTION INTRAVENOUS at 10:25

## 2022-08-24 NOTE — PROGRESS NOTES
Treatment tolerated well without complications  No complaints offered  Elastomeric pump connected per protocol  AVS declined  Left unit in stable condition

## 2022-08-24 NOTE — PROGRESS NOTES
Reviewed with Dr Leta Carrera that patient urine protein is still 3+  Last cycle MVASI was held   Per Dr Leta Carrera recommendations, MVASI to be held this cycle as well (Cycle 12)

## 2022-08-25 ENCOUNTER — OFFICE VISIT (OUTPATIENT)
Dept: HEMATOLOGY ONCOLOGY | Facility: CLINIC | Age: 57
End: 2022-08-25
Payer: COMMERCIAL

## 2022-08-25 ENCOUNTER — TELEPHONE (OUTPATIENT)
Dept: HEMATOLOGY ONCOLOGY | Facility: CLINIC | Age: 57
End: 2022-08-25

## 2022-08-25 VITALS
SYSTOLIC BLOOD PRESSURE: 132 MMHG | OXYGEN SATURATION: 98 % | RESPIRATION RATE: 18 BRPM | TEMPERATURE: 97.9 F | DIASTOLIC BLOOD PRESSURE: 74 MMHG | WEIGHT: 241 LBS | HEART RATE: 88 BPM | HEIGHT: 67 IN | BODY MASS INDEX: 37.83 KG/M2

## 2022-08-25 DIAGNOSIS — R53.83 OTHER FATIGUE: Primary | ICD-10-CM

## 2022-08-25 DIAGNOSIS — C18.4 MALIGNANT NEOPLASM OF TRANSVERSE COLON (HCC): Primary | ICD-10-CM

## 2022-08-25 DIAGNOSIS — C18.4 MALIGNANT NEOPLASM OF TRANSVERSE COLON (HCC): ICD-10-CM

## 2022-08-25 DIAGNOSIS — R80.8 OTHER PROTEINURIA: ICD-10-CM

## 2022-08-25 PROCEDURE — 3078F DIAST BP <80 MM HG: CPT | Performed by: INTERNAL MEDICINE

## 2022-08-25 PROCEDURE — 3066F NEPHROPATHY DOC TX: CPT | Performed by: INTERNAL MEDICINE

## 2022-08-25 PROCEDURE — 99214 OFFICE O/P EST MOD 30 MIN: CPT | Performed by: INTERNAL MEDICINE

## 2022-08-25 PROCEDURE — 3075F SYST BP GE 130 - 139MM HG: CPT | Performed by: INTERNAL MEDICINE

## 2022-08-26 ENCOUNTER — HOSPITAL ENCOUNTER (OUTPATIENT)
Dept: INFUSION CENTER | Facility: CLINIC | Age: 57
Discharge: HOME | End: 2022-08-26

## 2022-08-26 DIAGNOSIS — C18.4 MALIGNANT NEOPLASM OF TRANSVERSE COLON (HCC): Primary | ICD-10-CM

## 2022-08-26 DIAGNOSIS — R53.83 OTHER FATIGUE: ICD-10-CM

## 2022-08-26 NOTE — PROGRESS NOTES
Pt here for Chemo ball disconnect  [facility] appears deflated  Pt offers no complaints at this time  Port flushed and de-accessed  AVS declined  Walked out in stable condition

## 2022-08-27 LAB
CREAT UR-MCNC: 87.6 MG/DL
PROT UR-MCNC: 302.5 MG/DL

## 2022-08-27 PROCEDURE — 3062F POS MACROALBUMINURIA REV: CPT | Performed by: INTERNAL MEDICINE

## 2022-08-29 DIAGNOSIS — R80.1 PERSISTENT PROTEINURIA: Primary | ICD-10-CM

## 2022-08-29 NOTE — PROGRESS NOTES
I discussed with the patient over the phone about his Nephrotic range proteinuria, assessed based off of a random urine protein and creat  Nephrology referral placed with his consent,      Radha Galindo MD

## 2022-08-30 DIAGNOSIS — C18.4 MALIGNANT NEOPLASM OF TRANSVERSE COLON (HCC): ICD-10-CM

## 2022-08-30 DIAGNOSIS — R53.83 OTHER FATIGUE: Primary | ICD-10-CM

## 2022-08-31 ENCOUNTER — TELEPHONE (OUTPATIENT)
Dept: SURGICAL ONCOLOGY | Facility: CLINIC | Age: 57
End: 2022-08-31

## 2022-08-31 NOTE — TELEPHONE ENCOUNTER
Sooner appointment made available for patient to see Dr Naeem Cardona for consult visit  Patient accepted

## 2022-09-02 ENCOUNTER — PATIENT OUTREACH (OUTPATIENT)
Dept: HEMATOLOGY ONCOLOGY | Facility: CLINIC | Age: 57
End: 2022-09-02

## 2022-09-02 NOTE — PROGRESS NOTES
Intake received, chart reviewed for need of external records  Pathology completed:  Imaging completed:8/23/2022  All records needed are in patients chart  No further action required of Care Coordination team needed at this time

## 2022-09-03 LAB
ALBUMIN SERPL-MCNC: 3.8 G/DL (ref 3.8–4.9)
ALBUMIN/GLOB SERPL: 1.9 {RATIO} (ref 1.2–2.2)
ALP SERPL-CCNC: 206 IU/L (ref 44–121)
ALT SERPL-CCNC: 23 IU/L (ref 0–44)
APPEARANCE UR: CLEAR
AST SERPL-CCNC: 31 IU/L (ref 0–40)
BACTERIA URNS QL MICRO: NORMAL
BASOPHILS # BLD AUTO: 0 X10E3/UL (ref 0–0.2)
BASOPHILS NFR BLD AUTO: 1 %
BILIRUB SERPL-MCNC: 0.6 MG/DL (ref 0–1.2)
BILIRUB UR QL STRIP: NEGATIVE
BUN SERPL-MCNC: 16 MG/DL (ref 6–24)
BUN/CREAT SERPL: 12 (ref 9–20)
CALCIUM SERPL-MCNC: 8.9 MG/DL (ref 8.7–10.2)
CASTS URNS QL MICRO: NORMAL /LPF
CHLORIDE SERPL-SCNC: 104 MMOL/L (ref 96–106)
CO2 SERPL-SCNC: 23 MMOL/L (ref 20–29)
COLOR UR: YELLOW
CREAT SERPL-MCNC: 1.34 MG/DL (ref 0.76–1.27)
EGFR: 61 ML/MIN/1.73
EOSINOPHIL # BLD AUTO: 0.2 X10E3/UL (ref 0–0.4)
EOSINOPHIL NFR BLD AUTO: 2 %
EPI CELLS #/AREA URNS HPF: NORMAL /HPF (ref 0–10)
ERYTHROCYTE [DISTWIDTH] IN BLOOD BY AUTOMATED COUNT: 13.9 % (ref 11.6–15.4)
GLOBULIN SER-MCNC: 2 G/DL (ref 1.5–4.5)
GLUCOSE SERPL-MCNC: 191 MG/DL (ref 65–99)
GLUCOSE UR QL: ABNORMAL
HCT VFR BLD AUTO: 36.5 % (ref 37.5–51)
HGB BLD-MCNC: 12 G/DL (ref 13–17.7)
HGB UR QL STRIP: NEGATIVE
IMM GRANULOCYTES # BLD: 0.1 X10E3/UL (ref 0–0.1)
IMM GRANULOCYTES NFR BLD: 1 %
KETONES UR QL STRIP: NEGATIVE
LEUKOCYTE ESTERASE UR QL STRIP: NEGATIVE
LYMPHOCYTES # BLD AUTO: 1.9 X10E3/UL (ref 0.7–3.1)
LYMPHOCYTES NFR BLD AUTO: 26 %
MCH RBC QN AUTO: 31.8 PG (ref 26.6–33)
MCHC RBC AUTO-ENTMCNC: 32.9 G/DL (ref 31.5–35.7)
MCV RBC AUTO: 97 FL (ref 79–97)
MICRO URNS: ABNORMAL
MONOCYTES # BLD AUTO: 0.9 X10E3/UL (ref 0.1–0.9)
MONOCYTES NFR BLD AUTO: 12 %
NEUTROPHILS # BLD AUTO: 4.3 X10E3/UL (ref 1.4–7)
NEUTROPHILS NFR BLD AUTO: 58 %
NITRITE UR QL STRIP: NEGATIVE
PH UR STRIP: 5.5 [PH] (ref 5–7.5)
PLATELET # BLD AUTO: 211 X10E3/UL (ref 150–450)
POTASSIUM SERPL-SCNC: 3.9 MMOL/L (ref 3.5–5.2)
PROT SERPL-MCNC: 5.8 G/DL (ref 6–8.5)
PROT UR QL STRIP: ABNORMAL
RBC # BLD AUTO: 3.77 X10E6/UL (ref 4.14–5.8)
RBC #/AREA URNS HPF: NORMAL /HPF (ref 0–2)
SODIUM SERPL-SCNC: 139 MMOL/L (ref 134–144)
SP GR UR: 1.02 (ref 1–1.03)
UROBILINOGEN UR STRIP-ACNC: 0.2 MG/DL (ref 0.2–1)
WBC # BLD AUTO: 7.3 X10E3/UL (ref 3.4–10.8)
WBC #/AREA URNS HPF: NORMAL /HPF (ref 0–5)

## 2022-09-05 DIAGNOSIS — K21.9 GASTROESOPHAGEAL REFLUX DISEASE WITHOUT ESOPHAGITIS: ICD-10-CM

## 2022-09-05 RX ORDER — METOCLOPRAMIDE 10 MG/1
TABLET ORAL
Qty: 180 TABLET | Refills: 0 | Status: SHIPPED | OUTPATIENT
Start: 2022-09-05

## 2022-09-06 ENCOUNTER — CONSULT (OUTPATIENT)
Dept: SURGICAL ONCOLOGY | Facility: CLINIC | Age: 57
End: 2022-09-06
Payer: COMMERCIAL

## 2022-09-06 VITALS
SYSTOLIC BLOOD PRESSURE: 126 MMHG | DIASTOLIC BLOOD PRESSURE: 84 MMHG | HEART RATE: 80 BPM | TEMPERATURE: 97.5 F | WEIGHT: 246 LBS | RESPIRATION RATE: 18 BRPM | BODY MASS INDEX: 38.61 KG/M2 | OXYGEN SATURATION: 98 % | HEIGHT: 67 IN

## 2022-09-06 DIAGNOSIS — C78.7 COLON CANCER METASTASIZED TO LIVER (HCC): ICD-10-CM

## 2022-09-06 DIAGNOSIS — C18.4 MALIGNANT NEOPLASM OF TRANSVERSE COLON (HCC): Primary | ICD-10-CM

## 2022-09-06 DIAGNOSIS — C18.9 COLON CANCER METASTASIZED TO LIVER (HCC): ICD-10-CM

## 2022-09-06 PROCEDURE — 99205 OFFICE O/P NEW HI 60 MIN: CPT | Performed by: SURGERY

## 2022-09-06 NOTE — PROGRESS NOTES
Surgical Oncology Consult       [address] [facility] SURGICAL ONCOLOGY [place]  [address] [facility] [place] [address]    Cindy Rivas Sommer  1965  [id number]  [address] [facility] SURGICAL ONCOLOGY SHU  [address] [facility]  [place] [address]    Diagnoses and all orders for this visit:    Malignant neoplasm of transverse colon [facility])  -     Ambulatory referral to Surgical Oncology    Colon cancer metastasized to liver [facility])        No chief complaint on file  No follow-ups on file      Oncology History   Malignant neoplasm of transverse colon ([facility] )   3/1/2022 Initial Diagnosis    Malignant neoplasm of transverse colon ([facility] [address] )     3/23/2022 -  Chemotherapy    fluorouracil (ADRUCIL), 400 mg/m2 = 905 mg, Intravenous, Once, 12 of 18 cycles  Administration: 905 mg (3/23/2022), 875 mg (4/6/2022), 875 mg (4/20/2022), 870 mg (5/4/2022), 880 mg (5/18/2022), 875 mg (6/1/2022), 875 mg (6/14/2022), 875 mg (6/29/2022), 875 mg (7/13/2022), 875 mg (7/27/2022), 875 mg (8/10/2022), 875 mg (8/24/2022)  leucovorin calcium IVPB, 904 mg, Intravenous, Once, 12 of 18 cycles  Administration: 900 mg (3/23/2022), 876 mg (4/6/2022), 876 mg (4/20/2022), 868 mg (5/4/2022), 880 mg (5/18/2022), 876 mg (6/1/2022), 876 mg (6/14/2022), 876 mg (6/29/2022), 876 mg (7/13/2022), 876 mg (7/27/2022), 876 mg (8/10/2022), 876 mg (8/24/2022)  oxaliplatin (ELOXATIN) chemo infusion, 85 mg/m2 = 192 1 mg, Intravenous, Once, 12 of 12 cycles  Administration: 192 1 mg (3/23/2022), 186 15 mg (4/6/2022), 186 15 mg (4/20/2022), 184 45 mg (5/4/2022), 187 mg (5/18/2022), 186 15 mg (6/1/2022), 186 15 mg (6/14/2022), 186 15 mg (6/29/2022), 186 15 mg (7/13/2022), 186 15 mg (7/27/2022), 186 15 mg (8/10/2022), 186 15 mg (8/24/2022)  fluorouracil (ADRUCIL) ambulatory infusion Soln, 1,200 mg/m2/day = 5,425 mg, Intravenous, Over 46 hours, 12 of 18 cycles  bevacizumab-awwb (MVASI) IVPB, 5 mg/kg = 545 mg (100 % of original dose 5 mg/kg), Intravenous, Once, 10 of 16 cycles  Dose modification: 5 mg/kg (original dose 5 mg/kg, Cycle 1)  Administration: 545 mg (3/23/2022), 500 mg (4/6/2022), 500 mg (4/20/2022), 500 mg (5/4/2022), 540 mg (5/18/2022), 535 mg (6/1/2022), 545 mg (6/14/2022), 545 mg (6/29/2022), 545 mg (7/13/2022)         History of Present Illness:  A 54-year-old male who recently presented with some abdominal discomfort  He did have weight loss approximately 3 months prior to presenting to the hospital in February  This is was not a significant amount of weight at that time  Imaging revealed findings that were consistent with metastatic colon cancer  Tumor appeared to be confined to the colon and the liver at that time  He underwent loop colostomy and liver biopsy on February 20, 2022  This did reveal metastatic adenocarcinoma, consistent with a colon primary  Initial chest CT from February 19, 2022 revealed no obvious metastatic disease to the chest   He was treated with chemotherapy, [id number] with bevacizumab  Follow-up CT from August 23, 2022 reveals improvement in multiple liver metastasis  The transverse colon primary is no longer visible  His pulmonary nodules are stable, but there was 1 nodule in the right upper lobe that continues to decrease in size and this is felt to possibly be a metastasis  I personally reviewed the films  He who comes in now to discuss further therapy  No abdominal pain, nausea vomiting  His appetite is good  He has lost approximately 50 lb over the last 9 months  No nausea or vomiting  He has not had a colonoscopy  His CEA level has decreased from over 1400 to 69 2  Review of Systems  Complete ROS Surg Onc:   Constitutional: The patient denies new or recent history of general fatigue, no recent weight loss, no change in appetite  Eyes: No complaints of visual problems, no scleral icterus     ENT: no complaints of ear pain, no hoarseness, no difficulty swallowing,  no tinnitus and no new masses in head, oral cavity, or neck  Cardiovascular: No complaints of chest pain, no palpitations, no ankle edema  Respiratory: No complaints of shortness of breath, no cough  Gastrointestinal: No complaints of jaundice, no bloody stools, no pale stools  Genitourinary: No complaints of dysuria, no hematuria, no nocturia, no frequent urination, no urethral discharge  Musculoskeletal: No complaints of weakness, paralysis, joint stiffness or arthralgias  Integumentary: No complaints of rash, no new lesions  Neurological: No complaints of convulsions, no seizures, no dizziness  Hematologic/Lymphatic: No complaints of easy bruising  Endocrine:  No hot or cold intolerance  No polydipsia, polyphagia, or polyuria  Allergy/immunology:  No environmental allergies  No food allergies  Not immunocompromised  Skin:  No pallor or rash  No wound  Patient Active Problem List   Diagnosis    Type 2 diabetes mellitus without complication, with long-term current use of insulin ([place] [address] )    Benign essential hypertension    Mixed hyperlipidemia    Erectile dysfunction    Low testosterone    Obesity (BMI 30-39  9)    Testicular hypogonadism    Incarcerated umbilical hernia    Left ureteral calculus    Type 2 diabetes mellitus with hyperlipidemia (HCC)    Colonic mass    Microcytic anemia    Hypokalemia    Transaminitis    Thrombocytosis    Iron deficiency anemia, unspecified    Malignant neoplasm of transverse colon (HCC)    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)    Colon cancer metastasized to liver (HCC)    Colostomy prolapse ([facility] )    Other fatigue     Past Medical History:   Diagnosis Date    Abdominal pain 3/12/2022    [facility] failure [facility] )     [id number] resolved    Diabetes mellitus [facility] )     Enteritis 08/23/2016    Gastroparesis due to DM ([facility] ) 08/23/2016    GERD (gastroesophageal reflux disease)     Hernia, ventral 08/04/2016    Hyperlipidemia     Hypertension     Morbid obesity ([facility] ) 4/17/2018    Postoperative visit 3/2/2022    SIRS (systemic inflammatory response syndrome) ([facility] ) 3/12/2022    Stage 3a chronic kidney disease ([place] [address] ) 2/19/2022     Past Surgical History:   Procedure Laterality Date    COLOSTOMY N/A 2/20/2022    Procedure: COLOSTOMY LOOP, diverting;  Surgeon: Brandee Jauregui MD;  [facility]: [facility];  Service: General    ESOPHAGOGASTRODUODENOSCOPY N/A 8/24/2016    Procedure: ESOPHAGOGASTRODUODENOSCOPY (EGD); Surgeon: Toi Carter MD;  Location: [facility];   Service:     IR PORT PLACEMENT  3/10/2022    KIDNEY STONE SURGERY      LIVER BIOPSY LAPAROSCOPIC N/A 2/20/2022    Procedure: DIAGNOSTIC LAPAROSCOPIC LIVER BIOPSY, DIVERTING LOOP COLOSTOMY ;  Surgeon: Brandee Jauregui MD;  [facility];  Service: General    TONSILLECTOMY      UMBILICAL HERNIA REPAIR      UMBILICAL HERNIA REPAIR LAPAROSCOPIC N/A 4/26/2019    Procedure: LAPAROSCOPIC UMBILICAL HERNIA REPAIR;  Surgeon: Brandee Jauregui MD;  [facility];  Service: General     Family History   Problem Relation Age of Onset    Diabetes Mother         mellitus    Lung cancer Mother     Coronary artery disease Father      Social History     Socioeconomic History    Marital status: /Civil Union     Spouse name: Not on file    Number of children: Not on file    Years of education: Not on file    Highest education level: Not on file   Occupational History    Occupation: ACTOR     Employer: [employer]   Tobacco Use    Smoking status: Never Smoker    Smokeless tobacco: Never Used   Vaping Use    Vaping Use: Never used   Substance and Sexual Activity    Alcohol use: Not Currently     Comment: occasion    Drug use: No    Sexual activity: Yes     Partners: Female   Other Topics Concern    Not on file   Social History Narrative    Not on file     Social Determinants of Health     Financial Resource Strain: Not on file Food Insecurity: No Food Insecurity    Worried About Running Out of Food in the Last Year: Never true    Bibiana of Food in the Last Year: Never true   Transportation Needs: No Transportation Needs    Lack of Transportation (Medical): No    Lack of Transportation (Non-Medical): No   Physical Activity: Insufficiently Active    Days of Exercise per Week: 5 days    Minutes of Exercise per Session: 10 min   Stress: No Stress Concern Present    Feeling of Stress : Not at all   Social Connections: Not on file   Intimate Partner Violence: Not on file   Housing Stability: Low Risk     Unable to Pay for Housing in the Last Year: No    Number of Places Lived in the Last Year: 1    Unstable Housing in the Last Year: No       Current Outpatient Medications:     amLODIPine (NORVASC) 5 mg tablet, TAKE 1 TABLET BY MOUTH TWICE A DAY, Disp: 180 tablet, Rfl: 0    aspirin 81 MG tablet, Take 81 mg by mouth daily  , Disp: , Rfl:     atorvastatin (LIPITOR) 40 mg tablet, TAKE 1 TABLET BY MOUTH EVERY DAY, Disp: 90 tablet, Rfl: 3    Cholecalciferol (VITAMIN D3 PO), Take 400 Units by mouth daily, Disp: , Rfl:     Continuous Blood Gluc Sensor (UniiStyle Parrish 14 Day Sensor) MISC, Use 1 each every 14 (fourteen) days, Disp: 2 each, Rfl: 6    [START ON 9/7/2022] fluorouracil 5,255 mg in CADD/Elastomeric Infusion Device, Infuse 5,255 mg (1,200 mg/m2/day x 2 19 m2) into a catheter in a vein via infusion device over 46 hours for 2 days  Do not start before September 7, 2022 , Disp: 1 each, Rfl: 0    glyBURIDE-metFORMIN (GLUCOVANCE) 5-500 MG per tablet, Take 1 tablet by mouth daily with breakfast Taking 1 tablet in the morning , Disp: , Rfl:     Insulin Pen Needle (B-D UF III MINI PEN NEEDLES) 31G X 5 MM MISC, Inject under the skin daily, Disp: 100 each, Rfl: 3    Januvia 100 MG tablet, TAKE 1 TABLET BY MOUTH EVERY DAY, Disp: 90 tablet, Rfl: 3    Lantus SoloStar 100 units/mL SOPN, INJECT 30 UNITS UNDER THE SKIN DAILY, Disp: 45 mL, Rfl: 1    lisinopril (ZESTRIL) 40 mg tablet, TAKE 1 TABLET BY MOUTH EVERY DAY, Disp: 90 tablet, Rfl: 3    metoclopramide (REGLAN) 10 mg tablet, TAKE 1 TABLET BY MOUTH TWICE A DAY, Disp: 180 tablet, Rfl: 0    Multiple Vitamins-Minerals (multivitamin with minerals) tablet, Take 1 tablet by mouth daily, Disp: , Rfl:     Needle, Disp, (HYPODERMIC NEEDLE) 23G X 1-1/2" MISC, by Does not apply route once a week, Disp: 10 each, Rfl: 6    ondansetron (ZOFRAN) 4 mg tablet, Take 4 mg by mouth every 6 (six) hours as needed, Disp: , Rfl:     Ostomy Supplies MISC, Use in the morning, Disp: 100 each, Rfl: 3    pantoprazole (PROTONIX) 40 mg tablet, TAKE 1 TABLET BY MOUTH TWICE A DAY, Disp: 180 tablet, Rfl: 3  Allergies   Allergen Reactions    Shellfish-Derived Products - Food Allergy Anaphylaxis    Erythromycin GI Intolerance     Pt denies     There were no vitals filed for this visit  Physical Exam   Constitutional: General appearance: The Patient is well-developed and well-nourished who appears the stated age in no acute distress  Patient is pleasant and talkative  HEENT:  Normocephalic  Sclerae are anicteric  Mucous membranes are moist  Neck is supple without adenopathy  No JVD  Chest: The lungs are clear to auscultation  Cardiac: Heart is regular rate  Abdomen: Abdomen is soft, non-tender, non-distended and without masses  Extremities: There is no clubbing or cyanosis  There is no edema  Symmetric  Neuro: Grossly nonfocal  Gait is normal      Lymphatic: No evidence of cervical adenopathy bilaterally  No evidence of axillary adenopathy bilaterally  No evidence of inguinal adenopathy bilaterally  Skin: Warm, anicteric  Psych:  Patient is pleasant and talkative    Breasts:      Pathology:  Addendum   RESULTS OF IMMUNOHISTOCHEMICAL ANALYSIS FOR MISMATCH REPAIR PROTEIN LOSS  INTERPRETATION: NO LOSS OF NUCLEAR EXPRESSION OF MMR PROTEINS: LOW PROBABILITY OF MSI-H  Note: Background non-neoplastic tissue and/or internal control with intact nuclear expression       RESULTS:  Antibody          Clone               Description                           Results  MLH1               M1                  Mismatch repair protein       Intact nuclear expression  MVC9              [id number]       Mismatch repair protein       Intact nuclear expression  FYC7              68                   Mismatch repair protein       Intact nuclear expression  KVW2              [id number]         Mismatch repair protein       Intact nuclear expression     Comment:  A negative control and a positive control for each antibody have been reviewed and accepted  [facility] Specimen ID: [id number], Evaluator: Karina Moya MD  These tests were developed and their performance characteristics determined by [facility]  Arnav Sainz may not be cleared or approved by the [place]  Food and Drug Administration   The FDA determined that such clearance or approval is not necessary   These tests are used for clinical purposes  Arnavmerle Sainz should not be regarded as investigational or for research   This laboratory has been approved by [facility], designated as a high-complexity laboratory and is qualified to perform these tests      Comments: Patients whose tumors demonstrate lack of expression of one or more DNA mismatch repair proteins might be at risk for Duffy Syndrome  This cancer susceptibility syndrome greatly increases the risk of synchronous and/or metachronous cancers in the affected patients and their family members   Normal expression of all proteins does not completely rule out familial cancer predisposition      The [facility]'s Duffy Syndrome Surveillance Program Task Forces recommends that all patients with lack of expression of one or more DNA mismatch repair proteins and those with concerning personal or family history should undergo thorough evaluation, counseling and possibly genetic testing        Addendum electronically signed by Sana Segundo MD on 2/28/2022 at  2:30 PM   Final Diagnosis   A  Liver, biopsy:  - Adenocarcinoma, consistent with colorectal primary      Comment: Immunohistochemistry was performed on block A1  The tumor cells are positive for CK20, SATB2, CDX2,  and negative for TTF-1, NKX3 1, Ck7; DPC-4 shows no loss, supporting the above diagnosis  Dr Demarco Cedeno is notified of the diagnosis in [employer] via [employer] on 2/24/2022  at 11:36 am    Immunohistochemistry [facility]) testing for Mismatch Repair (MMR) proteins has been requested on block A1, and the results will be issued in an addendum       Electronically signed by Sana Segundo MD on 2/24/2022 at 11:42 AM         Labs:      Imaging  CT chest abdomen pelvis w contrast    Result Date: 8/25/2022  Narrative: CT CHEST, ABDOMEN AND PELVIS WITH IV CONTRAST INDICATION:   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  Dr Gualberto Damon note from 6/15/2022 was reviewed  Patient has history of metastatic colon cancer with extensive liver metastases, status post colostomy and chemotherapy  COMPARISON:  Comparison primary made with the most recent study, the chest abdomen, and pelvic CT from 6/7/2022  Older CTs were also removed  TECHNIQUE: CT examination of the chest, abdomen and pelvis was performed  Scanning through the abdomen was performed in arterial, venous and delayed phases according a protocol spefically designed to evaluate upper abdominal viscera  Axial, sagittal, and coronal 2D reformatted images were created from the source data and submitted for interpretation  Radiation dose length product (DLP) for this visit:  2267 mGy-cm   This examination, like all CT scans performed in the [facility], was performed utilizing techniques to minimize radiation dose exposure, including the use of iterative reconstruction and automated exposure control  IV Contrast:  59 mL of iohexol (OMNIPAQUE) Enteric Contrast: Enteric contrast was not administered   FINDINGS: CHEST LUNGS: There are multiple pulmonary nodules, which will be described on series 3: Image 51, right upper lobe, solid, smooth bordered, 2 mm, this nodule has continued to decrease in size, measuring 4 mm 6/7/2022 CT, and 6 mm on the 2/19/2022 CT  This is probably a metastasis given its trend  The following nodules are small and have demonstrated no change over several studies, and are probably benign: Image 71, right upper lobe, solid, smooth bordered, 3 mm, unchanged  Image 75, lingular left upper lobe, solid, smooth bordered, 3 mm, unchanged  Image 81, right lower lobe, solid, smooth bordered, 4 mm, unchanged  Image 105, right lower lobe, solid, smooth bordered, 2 mm, unchanged  Image 107, right lower lobe, solid, smooth bordered, 3 mm, unchanged  There are no new pulmonary nodules  PLEURA:  Unremarkable  HEART/GREAT VESSELS: Heart is unremarkable for patient's age  No thoracic aortic aneurysm  There is a Port-A-Cath  MEDIASTINUM AND NITA:  Unremarkable  CHEST WALL AND LOWER NECK:  Unremarkable  ABDOMEN LIVER/BILIARY TREE:  There is continued improvement in multiple liver metastases  For example, a right lobe segment 7 lesion currently measures 3 5 cm, previously 5 0 cm (series 5 image 56 ) Another right lobe, segment 8 lesion currently measures 2 1 cm, previously 3 7 cm (series 5 image 56 ) Another right lobe segment 6 lesion currently measures 2 4 cm, previously 3 2 cm (series 5 image 80 ) GALLBLADDER:  There are gallstone(s) within the gallbladder, without pericholecystic inflammatory changes  SPLEEN:  Unremarkable  PANCREAS:  Unremarkable  ADRENAL GLANDS:  Unremarkable  KIDNEYS/URETERS:  Unremarkable  No hydronephrosis  Small simple cyst in the right kidney posteriorly, unchanged  No suspicious renal lesions  No hydronephrosis  STOMACH AND BOWEL:  Again seen is transverse colon loop colostomy   The distal transverse colon primary tumor is no longer clearly visible, and would have been seen on series 5 image 65-70 based on prior CT's  APPENDIX:  No findings to suggest appendicitis  ABDOMINOPELVIC CAVITY:  No ascites  No pneumoperitoneum  No lymphadenopathy  VESSELS:  Unremarkable for patient's age  PELVIS REPRODUCTIVE ORGANS:  Unremarkable for patient's age  URINARY BLADDER:  Unremarkable  ABDOMINAL WALL/INGUINAL REGIONS:  Unremarkable  OSSEOUS STRUCTURES:  No acute fracture or destructive osseous lesion  Again seen is a lipoma in the left thigh adductor musculature  Impression: The distal transverse colon primary tumor is no longer clearly visible  There is continued improvement in multiple liver metastases  Index lesion measurements as above  Again seen are multiple pulmonary nodules, with an originally 6 mm right upper lobe nodule currently only 2 mm (series 3 image 51) having demonstrated gradual decrease in size over several studies therefore consistent with treated metastasis  All of the other small nodules have been stable and are likely benign  Workstation performed: [id number]     I reviewed the above laboratory and imaging data  Discussion/Summary:  50-year-old male with metastatic colon cancer  I did discuss the concern of the possible metastatic lung lesion  Despite this, he appears to have disease confined to his colon and his liver  I would recommend obtaining an MRI now to evaluate his liver  This will determine if liver resection/ablation as possible  This may need to be performed in stages  He may need to have a portal vein embolization to hypertrophy the remaining liver  If this can be performed in stages the for stage, can be performed at the time of colon resection  He should have a colonoscopy as well  We will schedule this  I will see him back once we have the results of the colonoscopy an MRI at that time we will finalize our treatment plans  He tells me his case is already being presented at working group  All of his questions were answered

## 2022-09-06 NOTE — LETTER
September 6, 2022     [facility]  [address] [address]    Patient: Sagrario Norman   YOB: 1965   Date of Visit: 9/6/2022       Dear Dr Елена Guevara:    Thank you for referring Mayur Husbands to me for evaluation  Below are my notes for this consultation  If you have questions, please do not hesitate to call me  I look forward to following your patient along with you  Sincerely,        Dempsey Fabry, MD        CC: Minette Banister, MD Dempsey Fabry, MD  9/6/2022  1:11 PM  Sign when Signing Visit               Surgical Oncology Consult       [address] [facility] SURGICAL ONCOLOGY [place]  [address]  [facility] [address]    Gabrielle RICHARDS Kemal  1965  [id number]  [address] [facility] SURGICAL ONCOLOGY [place]  [address] [place] [address]    Diagnoses and all orders for this visit:    Malignant neoplasm of transverse colon [facility])  -     Ambulatory referral to Surgical Oncology    Colon cancer metastasized to liver [facility])        No chief complaint on file  No follow-ups on file      Oncology History   Malignant neoplasm of transverse colon ([facility] )   3/1/2022 Initial Diagnosis    Malignant neoplasm of transverse colon ([facility] )     3/23/2022 -  Chemotherapy    fluorouracil (ADRUCIL), 400 mg/m2 = 905 mg, Intravenous, Once, 12 of 18 cycles  Administration: 905 mg (3/23/2022), 875 mg (4/6/2022), 875 mg (4/20/2022), 870 mg (5/4/2022), 880 mg (5/18/2022), 875 mg (6/1/2022), 875 mg (6/14/2022), 875 mg (6/29/2022), 875 mg (7/13/2022), 875 mg (7/27/2022), 875 mg (8/10/2022), 875 mg (8/24/2022)  leucovorin calcium IVPB, 904 mg, Intravenous, Once, 12 of 18 cycles  Administration: 900 mg (3/23/2022), 876 mg (4/6/2022), 876 mg (4/20/2022), 868 mg (5/4/2022), 880 mg (5/18/2022), 876 mg (6/1/2022), 876 mg (6/14/2022), 876 mg (6/29/2022), 876 mg (7/13/2022), 876 mg (7/27/2022), 876 mg (8/10/2022), 876 mg (8/24/2022)  oxaliplatin (ELOXATIN) chemo infusion, 85 mg/m2 = 192 1 mg, Intravenous, Once, 12 of 12 cycles  Administration: 192 1 mg (3/23/2022), 186 15 mg (4/6/2022), 186 15 mg (4/20/2022), 184 45 mg (5/4/2022), 187 mg (5/18/2022), 186 15 mg (6/1/2022), 186 15 mg (6/14/2022), 186 15 mg (6/29/2022), 186 15 mg (7/13/2022), 186 15 mg (7/27/2022), 186 15 mg (8/10/2022), 186 15 mg (8/24/2022)  fluorouracil (ADRUCIL) ambulatory infusion Soln, 1,200 mg/m2/day = 5,425 mg, Intravenous, Over 46 hours, 12 of 18 cycles  bevacizumab-awwb (MVASI) IVPB, 5 mg/kg = 545 mg (100 % of original dose 5 mg/kg), Intravenous, Once, 10 of 16 cycles  Dose modification: 5 mg/kg (original dose 5 mg/kg, Cycle 1)  Administration: 545 mg (3/23/2022), 500 mg (4/6/2022), 500 mg (4/20/2022), 500 mg (5/4/2022), 540 mg (5/18/2022), 535 mg (6/1/2022), 545 mg (6/14/2022), 545 mg (6/29/2022), 545 mg (7/13/2022)         History of Present Illness:  A 76-year-old male who recently presented with some abdominal discomfort  He did have weight loss approximately 3 months prior to presenting to the hospital in February  This is was not a significant amount of weight at that time  Imaging revealed findings that were consistent with metastatic colon cancer  Tumor appeared to be confined to the colon and the liver at that time  He underwent loop colostomy and liver biopsy on February 20, 2022  This did reveal metastatic adenocarcinoma, consistent with a colon primary  Initial chest CT from February 19, 2022 revealed no obvious metastatic disease to the chest   He was treated with chemotherapy, [id number] with bevacizumab  Follow-up CT from August 23, 2022 reveals improvement in multiple liver metastasis  The transverse colon primary is no longer visible  His pulmonary nodules are stable, but there was 1 nodule in the right upper lobe that continues to decrease in size and this is felt to possibly be a metastasis  I personally reviewed the films    He who comes in now to discuss further therapy  No abdominal pain, nausea vomiting  His appetite is good  He has lost approximately 50 lb over the last 9 months  No nausea or vomiting  He has not had a colonoscopy  His CEA level has decreased from over 1400 to 69 2  Review of Systems  Complete ROS Surg Onc:   Constitutional: The patient denies new or recent history of general fatigue, no recent weight loss, no change in appetite  Eyes: No complaints of visual problems, no scleral icterus  ENT: no complaints of ear pain, no hoarseness, no difficulty swallowing,  no tinnitus and no new masses in head, oral cavity, or neck  Cardiovascular: No complaints of chest pain, no palpitations, no ankle edema  Respiratory: No complaints of shortness of breath, no cough  Gastrointestinal: No complaints of jaundice, no bloody stools, no pale stools  Genitourinary: No complaints of dysuria, no hematuria, no nocturia, no frequent urination, no urethral discharge  Musculoskeletal: No complaints of weakness, paralysis, joint stiffness or arthralgias  Integumentary: No complaints of rash, no new lesions  Neurological: No complaints of convulsions, no seizures, no dizziness  Hematologic/Lymphatic: No complaints of easy bruising  Endocrine:  No hot or cold intolerance  No polydipsia, polyphagia, or polyuria  Allergy/immunology:  No environmental allergies  No food allergies  Not immunocompromised  Skin:  No pallor or rash  No wound  Patient Active Problem List   Diagnosis    Type 2 diabetes mellitus without complication, with long-term current use of insulin ([address] )    Benign essential hypertension    Mixed hyperlipidemia    Erectile dysfunction    Low testosterone    Obesity (BMI 30-39  9)    Testicular hypogonadism    Incarcerated umbilical hernia    Left ureteral calculus    Type 2 diabetes mellitus with hyperlipidemia (HCC)    Colonic mass    Microcytic anemia    Hypokalemia    Transaminitis    Thrombocytosis    Iron deficiency anemia, unspecified    Malignant neoplasm of transverse colon (HCC)    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)    Colon cancer metastasized to liver (HCC)    Colostomy prolapse (HCC)    Other fatigue     Past Medical History:   Diagnosis Date    Abdominal pain 3/12/2022    Acute renal failure ([address] [address] )     [id number] resolved    Diabetes mellitus ([address] [place] [address] )     Enteritis 08/23/2016    Gastroparesis due to DM ([address] [place] [address] ) 08/23/2016    GERD (gastroesophageal reflux disease)     Hernia, ventral 08/04/2016    Hyperlipidemia     Hypertension     Morbid obesity ([address] [place] [address] ) 4/17/2018    Postoperative visit 3/2/2022    SIRS (systemic inflammatory response syndrome) ([address] [place] [address] ) 3/12/2022    Stage 3a chronic kidney disease ([address] [address] ) 2/19/2022     Past Surgical History:   Procedure Laterality Date    COLOSTOMY N/A 2/20/2022    Procedure: COLOSTOMY LOOP, diverting;  Surgeon: Veronica Betancourt MD;  [facility];  Service: General    ESOPHAGOGASTRODUODENOSCOPY N/A 8/24/2016    Procedure: ESOPHAGOGASTRODUODENOSCOPY (EGD); Surgeon: Rock Simon MD;  Location: [facility];   Service:     IR PORT PLACEMENT  3/10/2022    KIDNEY STONE SURGERY      LIVER BIOPSY LAPAROSCOPIC N/A 2/20/2022    Procedure: DIAGNOSTIC LAPAROSCOPIC LIVER BIOPSY, DIVERTING LOOP COLOSTOMY ;  Surgeon: Veronica Betancourt MD;  [facility];  Service: General    TONSILLECTOMY      UMBILICAL HERNIA REPAIR      UMBILICAL HERNIA REPAIR LAPAROSCOPIC N/A 4/26/2019    Procedure: LAPAROSCOPIC UMBILICAL HERNIA REPAIR;  Surgeon: Veronica Betancourt MD;  [facility];  Service: General     Family History   Problem Relation Age of Onset    Diabetes Mother         mellitus    Lung cancer Mother     Coronary artery disease Father      Social History     Socioeconomic History    Marital status: /Civil Union     Spouse name: Not on file    Number of children: Not on file    Years of education: Not on file    Highest education level: Not on file   Occupational History    Occupation: ACTOR     Employer: [employer]   Tobacco Use    Smoking status: Never Smoker    Smokeless tobacco: Never Used   Vaping Use    Vaping Use: Never used   Substance and Sexual Activity    Alcohol use: Not Currently     Comment: occasion    Drug use: No    Sexual activity: Yes     Partners: Female   Other Topics Concern    Not on file   Social History Narrative    Not on file     Social Determinants of Health     Financial Resource Strain: Not on file   Food Insecurity: No Food Insecurity    Worried About Running Out of Food in the Last Year: Never true    Bibiana of Food in the Last Year: Never true   Transportation Needs: No Transportation Needs    Lack of Transportation (Medical): No    Lack of Transportation (Non-Medical): No   Physical Activity: Insufficiently Active    Days of Exercise per Week: 5 days    Minutes of Exercise per Session: 10 min   Stress: No Stress Concern Present    Feeling of Stress : Not at all   Social Connections: Not on file   Intimate Partner Violence: Not on file   Housing Stability: Low Risk     Unable to Pay for Housing in the Last Year: No    Number of Places Lived in the Last Year: 1    Unstable Housing in the Last Year: No       Current Outpatient Medications:     amLODIPine (NORVASC) 5 mg tablet, TAKE 1 TABLET BY MOUTH TWICE A DAY, Disp: 180 tablet, Rfl: 0    aspirin 81 MG tablet, Take 81 mg by mouth daily  , Disp: , Rfl:     atorvastatin (LIPITOR) 40 mg tablet, TAKE 1 TABLET BY MOUTH EVERY DAY, Disp: 90 tablet, Rfl: 3    Cholecalciferol (VITAMIN D3 PO), Take 400 Units by mouth daily, Disp: , Rfl:     Continuous Blood Gluc Sensor (FreeStyle Parrish 14 Day Sensor) MISC, Use 1 each every 14 (fourteen) days, Disp: 2 each, Rfl: 6    [START ON 9/7/2022] fluorouracil 5,255 mg in CADD/Elastomeric Infusion Device, Infuse 5,255 mg (1,200 mg/m2/day x 2 19 m2) into a catheter in a vein via infusion device over 46 hours for 2 days  Do not start before September 7, 2022 , Disp: 1 each, Rfl: 0    glyBURIDE-metFORMIN (GLUCOVANCE) 5-500 MG per tablet, Take 1 tablet by mouth daily with breakfast Taking 1 tablet in the morning , Disp: , Rfl:     Insulin Pen Needle (B-D UF III MINI PEN NEEDLES) 31G X 5 MM MISC, Inject under the skin daily, Disp: 100 each, Rfl: 3    Januvia 100 MG tablet, TAKE 1 TABLET BY MOUTH EVERY DAY, Disp: 90 tablet, Rfl: 3    Lantus SoloStar 100 units/mL SOPN, INJECT 30 UNITS UNDER THE SKIN DAILY, Disp: 45 mL, Rfl: 1    lisinopril (ZESTRIL) 40 mg tablet, TAKE 1 TABLET BY MOUTH EVERY DAY, Disp: 90 tablet, Rfl: 3    metoclopramide (REGLAN) 10 mg tablet, TAKE 1 TABLET BY MOUTH TWICE A DAY, Disp: 180 tablet, Rfl: 0    Multiple Vitamins-Minerals (multivitamin with minerals) tablet, Take 1 tablet by mouth daily, Disp: , Rfl:     Needle, Disp, (HYPODERMIC NEEDLE) 23G X 1-1/2" MISC, by Does not apply route once a week, Disp: 10 each, Rfl: 6    ondansetron (ZOFRAN) 4 mg tablet, Take 4 mg by mouth every 6 (six) hours as needed, Disp: , Rfl:     Ostomy Supplies MISC, Use in the morning, Disp: 100 each, Rfl: 3    pantoprazole (PROTONIX) 40 mg tablet, TAKE 1 TABLET BY MOUTH TWICE A DAY, Disp: 180 tablet, Rfl: 3  Allergies   Allergen Reactions    Shellfish-Derived Products - Food Allergy Anaphylaxis    Erythromycin GI Intolerance     Pt denies     There were no vitals filed for this visit  Physical Exam   Constitutional: General appearance: The Patient is well-developed and well-nourished who appears the stated age in no acute distress  Patient is pleasant and talkative  HEENT:  Normocephalic  Sclerae are anicteric  Mucous membranes are moist  Neck is supple without adenopathy  No JVD  Chest: The lungs are clear to auscultation  Cardiac: Heart is regular rate  Abdomen: Abdomen is soft, non-tender, non-distended and without masses  Extremities:  There is no clubbing or cyanosis  There is no edema  Symmetric  Neuro: Grossly nonfocal  Gait is normal      Lymphatic: No evidence of cervical adenopathy bilaterally  No evidence of axillary adenopathy bilaterally  No evidence of inguinal adenopathy bilaterally  Skin: Warm, anicteric  Psych:  Patient is pleasant and talkative  Breasts:      Pathology:  Addendum   RESULTS OF IMMUNOHISTOCHEMICAL ANALYSIS FOR MISMATCH REPAIR PROTEIN LOSS  INTERPRETATION: NO LOSS OF NUCLEAR EXPRESSION OF MMR PROTEINS: LOW PROBABILITY OF MSI-H  Note: Background non-neoplastic tissue and/or internal control with intact nuclear expression       RESULTS:  Antibody          Clone               Description                           Results  MLH1               M1                  Mismatch repair protein       Intact nuclear expression  NQY3              [id number]       Mismatch repair protein       Intact nuclear expression  WKM7              85                   Mismatch repair protein       Intact nuclear expression  SHK7              [id number]         Mismatch repair protein       Intact nuclear expression     Comment:  A negative control and a positive control for each antibody have been reviewed and accepted  [facility] Specimen ID: [id number], Evaluator: Zonia Sosa MD  These tests were developed and their performance characteristics determined by [facility]  Rosemary Cooney may not be cleared or approved by the [place]  Food and Drug Administration   The FDA determined that such clearance or approval is not necessary   These tests are used for clinical purposes  Rosemary Eros should not be regarded as investigational or for research   This laboratory has been approved by [place], designated as a high-complexity laboratory and is qualified to perform these tests      Comments: Patients whose tumors demonstrate lack of expression of one or more DNA mismatch repair proteins might be at risk for Duffy Syndrome   This cancer susceptibility syndrome greatly increases the risk of synchronous and/or metachronous cancers in the affected patients and their family members  Normal expression of all proteins does not completely rule out familial cancer predisposition      The [facility] Duffy Syndrome Surveillance Program Task Forces recommends that all patients with lack of expression of one or more DNA mismatch repair proteins and those with concerning personal or family history should undergo thorough evaluation, counseling and possibly genetic testing        Addendum electronically signed by Dio Stephen MD on 2/28/2022 at  2:30 PM   Final Diagnosis   A  Liver, biopsy:  - Adenocarcinoma, consistent with colorectal primary      Comment: Immunohistochemistry was performed on block A1  The tumor cells are positive for CK20, SATB2, CDX2,  and negative for TTF-1, NKX3 1, Ck7; DPC-4 shows no loss, supporting the above diagnosis  Dr Paul Amor is notified of the diagnosis in [facility] via [employer] on 2/24/2022  at 11:36 am    Immunohistochemistry [facility]) testing for Mismatch Repair (MMR) proteins has been requested on block A1, and the results will be issued in an addendum       Electronically signed by Dio Stephen MD on 2/24/2022 at 11:42 AM         Labs:      Imaging  CT chest abdomen pelvis w contrast    Result Date: 8/25/2022  Narrative: CT CHEST, ABDOMEN AND PELVIS WITH IV CONTRAST INDICATION:   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  Dr Ana March note from 6/15/2022 was reviewed  Patient has history of metastatic colon cancer with extensive liver metastases, status post colostomy and chemotherapy  COMPARISON:  Comparison primary made with the most recent study, the chest abdomen, and pelvic CT from 6/7/2022  Older CTs were also removed  TECHNIQUE: CT examination of the chest, abdomen and pelvis was performed   Scanning through the abdomen was performed in arterial, venous and delayed phases according a protocol spefically designed to evaluate upper abdominal viscera  Axial, sagittal, and coronal 2D reformatted images were created from the source data and submitted for interpretation  Radiation dose length product (DLP) for this visit:  2267 mGy-cm   This examination, like all CT scans performed in the [facility], was performed utilizing techniques to minimize radiation dose exposure, including the use of iterative reconstruction and automated exposure control  IV Contrast:  59 mL of iohexol (OMNIPAQUE) Enteric Contrast: Enteric contrast was not administered  FINDINGS: CHEST LUNGS: There are multiple pulmonary nodules, which will be described on series 3: Image 51, right upper lobe, solid, smooth bordered, 2 mm, this nodule has continued to decrease in size, measuring 4 mm 6/7/2022 CT, and 6 mm on the 2/19/2022 CT  This is probably a metastasis given its trend  The following nodules are small and have demonstrated no change over several studies, and are probably benign: Image 71, right upper lobe, solid, smooth bordered, 3 mm, unchanged  Image 75, lingular left upper lobe, solid, smooth bordered, 3 mm, unchanged  Image 81, right lower lobe, solid, smooth bordered, 4 mm, unchanged  Image 105, right lower lobe, solid, smooth bordered, 2 mm, unchanged  Image 107, right lower lobe, solid, smooth bordered, 3 mm, unchanged  There are no new pulmonary nodules  PLEURA:  Unremarkable  HEART/GREAT VESSELS: Heart is unremarkable for patient's age  No thoracic aortic aneurysm  There is a Port-A-Cath  MEDIASTINUM AND NITA:  Unremarkable  CHEST WALL AND LOWER NECK:  Unremarkable  ABDOMEN LIVER/BILIARY TREE:  There is continued improvement in multiple liver metastases   For example, a right lobe segment 7 lesion currently measures 3 5 cm, previously 5 0 cm (series 5 image 56 ) Another right lobe, segment 8 lesion currently measures 2 1 cm, previously 3 7 cm (series 5 image 56 ) Another right lobe segment 6 lesion currently measures 2 4 cm, previously 3 2 cm (series 5 image 80 ) GALLBLADDER:  There are gallstone(s) within the gallbladder, without pericholecystic inflammatory changes  SPLEEN:  Unremarkable  PANCREAS:  Unremarkable  ADRENAL GLANDS:  Unremarkable  KIDNEYS/URETERS:  Unremarkable  No hydronephrosis  Small simple cyst in the right kidney posteriorly, unchanged  No suspicious renal lesions  No hydronephrosis  STOMACH AND BOWEL:  Again seen is transverse colon loop colostomy  The distal transverse colon primary tumor is no longer clearly visible, and would have been seen on series 5 image 65-70 based on prior CT's  APPENDIX:  No findings to suggest appendicitis  ABDOMINOPELVIC CAVITY:  No ascites  No pneumoperitoneum  No lymphadenopathy  VESSELS:  Unremarkable for patient's age  PELVIS REPRODUCTIVE ORGANS:  Unremarkable for patient's age  URINARY BLADDER:  Unremarkable  ABDOMINAL WALL/INGUINAL REGIONS:  Unremarkable  OSSEOUS STRUCTURES:  No acute fracture or destructive osseous lesion  Again seen is a lipoma in the left thigh adductor musculature  Impression: The distal transverse colon primary tumor is no longer clearly visible  There is continued improvement in multiple liver metastases  Index lesion measurements as above  Again seen are multiple pulmonary nodules, with an originally 6 mm right upper lobe nodule currently only 2 mm (series 3 image 51) having demonstrated gradual decrease in size over several studies therefore consistent with treated metastasis  All of the other small nodules have been stable and are likely benign  Workstation performed: [id number]     I reviewed the above laboratory and imaging data  Discussion/Summary:  79-year-old male with metastatic colon cancer  I did discuss the concern of the possible metastatic lung lesion  Despite this, he appears to have disease confined to his colon and his liver  I would recommend obtaining an MRI now to evaluate his liver  This will determine if liver resection/ablation as possible    This may need to be performed in stages  He may need to have a portal vein embolization to hypertrophy the remaining liver  If this can be performed in stages the for stage, can be performed at the time of colon resection  He should have a colonoscopy as well  We will schedule this  I will see him back once we have the results of the colonoscopy an MRI at that time we will finalize our treatment plans  He tells me his case is already being presented at working group  All of his questions were answered

## 2022-09-07 ENCOUNTER — HOSPITAL ENCOUNTER (OUTPATIENT)
Dept: INFUSION CENTER | Facility: CLINIC | Age: 57
Discharge: HOME | End: 2022-09-07
Payer: COMMERCIAL

## 2022-09-07 ENCOUNTER — NUTRITION (OUTPATIENT)
Dept: NUTRITION | Facility: CLINIC | Age: 57
End: 2022-09-07

## 2022-09-07 VITALS
SYSTOLIC BLOOD PRESSURE: 138 MMHG | HEIGHT: 67 IN | BODY MASS INDEX: 38.77 KG/M2 | HEART RATE: 78 BPM | WEIGHT: 247 LBS | RESPIRATION RATE: 20 BRPM | DIASTOLIC BLOOD PRESSURE: 86 MMHG | TEMPERATURE: 97 F | OXYGEN SATURATION: 98 %

## 2022-09-07 DIAGNOSIS — Z71.3 NUTRITIONAL COUNSELING: Primary | ICD-10-CM

## 2022-09-07 DIAGNOSIS — R53.83 OTHER FATIGUE: ICD-10-CM

## 2022-09-07 DIAGNOSIS — C18.4 MALIGNANT NEOPLASM OF TRANSVERSE COLON (HCC): Primary | ICD-10-CM

## 2022-09-07 PROCEDURE — 96366 THER/PROPH/DIAG IV INF ADDON: CPT

## 2022-09-07 PROCEDURE — 96409 CHEMO IV PUSH SNGL DRUG: CPT

## 2022-09-07 PROCEDURE — G0498 CHEMO EXTEND IV INFUS W/PUMP: HCPCS

## 2022-09-07 PROCEDURE — 96367 TX/PROPH/DG ADDL SEQ IV INF: CPT

## 2022-09-07 RX ORDER — SODIUM CHLORIDE 9 MG/ML
20 INJECTION, SOLUTION INTRAVENOUS ONCE
Status: COMPLETED | OUTPATIENT
Start: 2022-09-07 | End: 2022-09-07

## 2022-09-07 RX ORDER — FLUOROURACIL 50 MG/ML
400 INJECTION, SOLUTION INTRAVENOUS ONCE
Status: COMPLETED | OUTPATIENT
Start: 2022-09-07 | End: 2022-09-07

## 2022-09-07 RX ORDER — DEXTROSE MONOHYDRATE 50 MG/ML
20 INJECTION, SOLUTION INTRAVENOUS ONCE
Status: COMPLETED | OUTPATIENT
Start: 2022-09-07 | End: 2022-09-07

## 2022-09-07 RX ADMIN — DEXAMETHASONE SODIUM PHOSPHATE: 10 INJECTION, SOLUTION INTRAMUSCULAR; INTRAVENOUS at 08:54

## 2022-09-07 RX ADMIN — SODIUM CHLORIDE 20 ML/HR: 0.9 INJECTION, SOLUTION INTRAVENOUS at 08:54

## 2022-09-07 RX ADMIN — LEUCOVORIN CALCIUM 876 MG: 350 INJECTION, POWDER, LYOPHILIZED, FOR SOLUTION INTRAMUSCULAR; INTRAVENOUS at 09:39

## 2022-09-07 RX ADMIN — FLUOROURACIL 875 MG: 50 INJECTION, SOLUTION INTRAVENOUS at 11:39

## 2022-09-07 RX ADMIN — DEXTROSE 20 ML/HR: 5 SOLUTION INTRAVENOUS at 09:35

## 2022-09-07 NOTE — PROGRESS NOTES
Outpatient Oncology Nutrition Consultation   Type of Consult: Follow Up  Care Location: Infusion Center    Reason for referral: From Infusion RN on 3/23/22 (reason for referral: Malnutrition Screening Tool (MST) Triggers: scored a 4 indicating >/=34# (>/=15 4 kg) recent wt loss and is eating poorly due to a decreased appetite  Date of MST: 3/23/22 )    Nutrition Assessment:   Oncology Diagnosis & Treatments: Diagnosed with colon cancer 3/1/22  · S/p colostomy 2/20/22  · Began chemotherapy (adrucil, oxaliplatin, mvasi) 3/23/22  · Planned for colonoscopy and liver resection/ablation in the near future       Oncology History   Malignant neoplasm of transverse colon ([facility] [place] [address] )   3/1/2022 Initial Diagnosis    Malignant neoplasm of transverse colon ([facility] [place] [address] )     3/23/2022 -  Chemotherapy    fluorouracil (ADRUCIL), 400 mg/m2 = 905 mg, Intravenous, Once, 13 of 18 cycles  Administration: 905 mg (3/23/2022), 875 mg (4/6/2022), 875 mg (4/20/2022), 870 mg (5/4/2022), 880 mg (5/18/2022), 875 mg (6/1/2022), 875 mg (6/14/2022), 875 mg (6/29/2022), 875 mg (7/13/2022), 875 mg (7/27/2022), 875 mg (8/10/2022), 875 mg (8/24/2022)  leucovorin calcium IVPB, 904 mg, Intravenous, Once, 13 of 18 cycles  Administration: 900 mg (3/23/2022), 876 mg (4/6/2022), 876 mg (4/20/2022), 868 mg (5/4/2022), 880 mg (5/18/2022), 876 mg (6/1/2022), 876 mg (6/14/2022), 876 mg (6/29/2022), 876 mg (7/13/2022), 876 mg (7/27/2022), 876 mg (8/10/2022), 876 mg (8/24/2022)  oxaliplatin (ELOXATIN) chemo infusion, 85 mg/m2 = 192 1 mg, Intravenous, Once, 12 of 12 cycles  Administration: 192 1 mg (3/23/2022), 186 15 mg (4/6/2022), 186 15 mg (4/20/2022), 184 45 mg (5/4/2022), 187 mg (5/18/2022), 186 15 mg (6/1/2022), 186 15 mg (6/14/2022), 186 15 mg (6/29/2022), 186 15 mg (7/13/2022), 186 15 mg (7/27/2022), 186 15 mg (8/10/2022), 186 15 mg (8/24/2022)  fluorouracil (ADRUCIL) ambulatory infusion Soln, 1,200 mg/m2/day = 5,425 mg, Intravenous, Over 46 hours, 13 of 18 cycles  bevacizumab-awwb (MVASI) IVPB, 5 mg/kg = 545 mg (100 % of original dose 5 mg/kg), Intravenous, Once, 10 of 15 cycles  Dose modification: 5 mg/kg (original dose 5 mg/kg, Cycle 1)  Administration: 545 mg (3/23/2022), 500 mg (4/6/2022), 500 mg (4/20/2022), 500 mg (5/4/2022), 540 mg (5/18/2022), 535 mg (6/1/2022), 545 mg (6/14/2022), 545 mg (6/29/2022), 545 mg (7/13/2022)       Past Medical & Surgical Hx: T2DM, gastroparesis, HLD, CKD, GERD  Patient Active Problem List   Diagnosis    Type 2 diabetes mellitus without complication, with long-term current use of insulin (HCC)    Benign essential hypertension    Mixed hyperlipidemia    Erectile dysfunction    Low testosterone    Obesity (BMI 30-39  9)    Testicular hypogonadism    Incarcerated umbilical hernia    Left ureteral calculus    Type 2 diabetes mellitus with hyperlipidemia (HCC)    Colonic mass    Microcytic anemia    Hypokalemia    Transaminitis    Thrombocytosis    Iron deficiency anemia, unspecified    Malignant neoplasm of transverse colon (HCC)    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)    Colon cancer metastasized to liver (HCC)    Colostomy prolapse (HCC)    Other fatigue     Past Medical History:   Diagnosis Date    Abdominal pain 3/12/2022    Acute renal failure ([facility] [address] )     [id number] resolved    Diabetes mellitus ([address] )     Enteritis 08/23/2016    Gastroparesis due to DM ([address] ) 08/23/2016    GERD (gastroesophageal reflux disease)     Hernia, ventral 08/04/2016    Hyperlipidemia     Hypertension     Morbid obesity ([address] ) 4/17/2018    Postoperative visit 3/2/2022    SIRS (systemic inflammatory response syndrome) ([address] ) 3/12/2022    Stage 3a chronic kidney disease ([address] ) 2/19/2022     Past Surgical History:   Procedure Laterality Date    COLOSTOMY N/A 2/20/2022    Procedure: COLOSTOMY LOOP, diverting;  Surgeon: Demarco Cedeno MD;  Location: [facility];  Service: General    ESOPHAGOGASTRODUODENOSCOPY N/A 8/24/2016    Procedure: ESOPHAGOGASTRODUODENOSCOPY (EGD); Surgeon: Saba Delgado MD;  Location: [facility]; Service:     IR PORT PLACEMENT  3/10/2022    KIDNEY STONE SURGERY      LIVER BIOPSY LAPAROSCOPIC N/A 2/20/2022    Procedure: DIAGNOSTIC LAPAROSCOPIC LIVER BIOPSY, DIVERTING LOOP COLOSTOMY ;  Surgeon: Nicki Fong MD;  [facility];  Service: General    TONSILLECTOMY      UMBILICAL HERNIA REPAIR      UMBILICAL HERNIA REPAIR LAPAROSCOPIC N/A 4/26/2019    Procedure: LAPAROSCOPIC UMBILICAL HERNIA REPAIR;  Surgeon: Nicki Fong MD;  [facility];  Service: General       Review of Medications:   Vitamins, Supplements and Herbals: Med List Reviewed & pt is only taking: vitamin D 400IU    Current Outpatient Medications:     amLODIPine (NORVASC) 5 mg tablet, TAKE 1 TABLET BY MOUTH TWICE A DAY, Disp: 180 tablet, Rfl: 0    aspirin 81 MG tablet, Take 81 mg by mouth daily  , Disp: , Rfl:     atorvastatin (LIPITOR) 40 mg tablet, TAKE 1 TABLET BY MOUTH EVERY DAY, Disp: 90 tablet, Rfl: 3    Cholecalciferol (VITAMIN D3 PO), Take 400 Units by mouth daily, Disp: , Rfl:     Continuous Blood Gluc Sensor (FreeStyle Parrish 14 Day Sensor) MISC, Use 1 each every 14 (fourteen) days, Disp: 2 each, Rfl: 6    fluorouracil 5,255 mg in CADD/Elastomeric Infusion Device, Infuse 5,255 mg (1,200 mg/m2/day x 2 19 m2) into a catheter in a vein via infusion device over 46 hours for 2 days  Do not start before September 7, 2022 , Disp: 1 each, Rfl: 0    glyBURIDE-metFORMIN (GLUCOVANCE) 5-500 MG per tablet, Take 1 tablet by mouth daily with breakfast Taking 1 tablet in the morning , Disp: , Rfl:     Insulin Pen Needle (B-D UF III MINI PEN NEEDLES) 31G X 5 MM MISC, Inject under the skin daily, Disp: 100 each, Rfl: 3    Januvia 100 MG tablet, TAKE 1 TABLET BY MOUTH EVERY DAY, Disp: 90 tablet, Rfl: 3    Lantus SoloStar 100 units/mL SOPN, INJECT 30 UNITS UNDER THE SKIN DAILY, Disp: 45 mL, Rfl: 1    lisinopril (ZESTRIL) 40 mg tablet, TAKE 1 TABLET BY MOUTH EVERY DAY, Disp: 90 tablet, Rfl: 3    metoclopramide (REGLAN) 10 mg tablet, TAKE 1 TABLET BY MOUTH TWICE A DAY, Disp: 180 tablet, Rfl: 0    Multiple Vitamins-Minerals (multivitamin with minerals) tablet, Take 1 tablet by mouth daily, Disp: , Rfl:     Needle, Disp, (HYPODERMIC NEEDLE) 23G X 1-1/2" MISC, by Does not apply route once a week, Disp: 10 each, Rfl: 6    ondansetron (ZOFRAN) 4 mg tablet, Take 4 mg by mouth every 6 (six) hours as needed (Patient not taking: Reported on 9/6/2022), Disp: , Rfl:     Ostomy Supplies MISC, Use in the morning, Disp: 100 each, Rfl: 3    pantoprazole (PROTONIX) 40 mg tablet, TAKE 1 TABLET BY MOUTH TWICE A DAY, Disp: 180 tablet, Rfl: 3  No current facility-administered medications for this visit      Facility-Administered Medications Ordered in Other Visits:     fluorouracil (ADRUCIL) injection 875 mg, 400 mg/m2 (Treatment Plan Recorded), Intravenous, Once, Shala Escoto MD    leucovorin 876 mg in dextrose 5 % 250 mL IVPB, 400 mg/m2 (Treatment Plan Recorded), Intravenous, Once, Shala Escoto MD, Last Rate: 146 9 mL/hr at 09/07/22 0939, 876 mg at 09/07/22 0939    Most Recent Lab Results: Checks BG at home: 90-100mg/dL in the AM, ~200mg/dL in the evening   Lab Results   Component Value Date    WBC 7 3 09/02/2022    NEUTROABS 4 3 09/02/2022    IRON 26 (L) 02/20/2022    TIBC 235 (L) 02/20/2022    FERRITIN 125 02/20/2022    CHOLESTEROL 155 08/19/2022    CHOL 91 (L) 04/10/2017    TRIG 157 (H) 08/19/2022    HDL 51 08/19/2022    LDLCALC 77 08/19/2022    ALT 23 09/02/2022    AST 31 09/02/2022    ALB 3 8 09/02/2022     05/02/2017     04/10/2017    SODIUM 139 09/02/2022    SODIUM 138 08/19/2022    K 3 9 09/02/2022    K 3 8 08/19/2022     09/02/2022    BUN 16 09/02/2022    BUN 20 08/19/2022    CREATININE 1 34 (H) 09/02/2022    CREATININE 1 39 (H) 08/19/2022    EGFR 61 09/02/2022    TSH 0 957 08/19/2022    GLUCOSE 118 (H) 05/02/2017    POCGLU 309 (H) 03/14/2022    GLUF 246 (H) 03/14/2022    GLUC 191 (H) 09/02/2022    HGBA1C 8 4 (H) 08/19/2022    HGBA1C 8 7 (H) 05/27/2022    HGBA1C 7 5 (H) 02/10/2022    CALCIUM 7 9 (L) 03/14/2022       Anthropometric Measurements:   Height: 68"  Ht Readings from Last 1 Encounters:   09/07/22 5' 7 01" (1 702 m)     Wt Readings from Last 27 Encounters:   09/07/22 112 kg (247 lb)   09/06/22 112 kg (246 lb)   08/25/22 109 kg (241 lb)   08/24/22 109 kg (241 lb 3 2 oz)   08/10/22 110 kg (243 lb 9 6 oz)   08/09/22 110 kg (243 lb 6 4 oz)   07/27/22 110 kg (241 lb 9 3 oz)   07/13/22 110 kg (243 lb 9 6 oz)   06/29/22 110 kg (243 lb)   06/15/22 109 kg (241 lb)   06/14/22 110 kg (241 lb 12 8 oz)   06/01/22 109 kg (239 lb 3 2 oz)   05/27/22 108 kg (237 lb)   05/24/22 108 kg (237 lb 6 4 oz)   05/18/22 107 kg (236 lb 9 6 oz)   05/06/22 108 kg (237 lb)   05/04/22 101 kg (223 lb 5 2 oz)   04/28/22 106 kg (232 lb 9 6 oz)   04/20/22 104 kg (229 lb 3 2 oz)   04/06/22 104 kg (228 lb 6 4 oz)   04/04/22 103 kg (227 lb)   03/23/22 105 kg (231 lb 11 3 oz)   03/18/22 105 kg (231 lb 3 2 oz)   03/17/22 105 kg (232 lb)   03/14/22 110 kg (241 lb 10 oz)   03/10/22 108 kg (239 lb)   03/07/22 109 kg (239 lb 6 4 oz)     Weight History:    Usual Weight: 290#   Varian: n/a   Home Scale: none     Oncology Nutrition-Anthropometrics    Flowsheet Row Nutrition from 9/7/2022 in [address] [facility] Nutrition from 8/10/2022 in [facility] Dietitian Dwayne   Patient age (years): 61 years 61 years   Patient (male) height (in): 76 in 76 in   Current weight (lbs): 247 lbs 243 6 lbs   Current weight to be used for anthropometric calculations (kg) 112 3 kg 110 7 kg   BMI: 37 6 37   IBW male 154 lb 154 lb   IBW (kg) male 70 kg 70 kg   IBW % (male) 160 4 % 158 2 %   Adjusted BW (male): 177 3 lbs 176 4 lbs   Adjusted BW in kg (male): 80 6 kg 80 2 kg   % weight change after 1 month: 1 5 % 0 %   Weight change after 1 month (lbs) 3 6 lbs 0 lbs   % weight change after 3 months: 3 3 % 2 8 %   Weight change after 3 months (lbs) 7 8 lbs 6 6 lbs   % weight change after 6 months: 3 2 % -16 9 %   Weight change after 6 months (lbs) 7 6 lbs -49 6 lbs          Nutrition-Focused Physical Findings: none observed    Food/Nutrition-Related History & Client/Social History:    Current Nutrition Impact Symptoms:  [] Nausea  [x] Reduced Appetite -fluctuating [] Acid Reflux    [] Vomiting  [] Unintended Wt Loss  [] Malabsorption    [] Diarrhea  [] Unintended Wt Gain  [] Dumping Syndrome    [] Constipation  [] Thick Mucous/Secretions  [] Abdominal Pain    [] Dysgeusia (Altered Taste)  [] Xerostomia (Dry Mouth)  [] Gas    [] Dysosmia (Altered Smell)  [] Thrush  [] Difficulty Chewing    [] Oral Mucositis (Sore Mouth)  [] Fatigue  [x] Hyperglycemia: hba1c 8 4% on 8/19/22; BG nonfasting 191mg/dL on 9/2/22   [] Odynophagia  [] Esophagitis  [] Other: hx of low iron    [] Dysphagia  [] Early Satiety  [] No Problems Eating      Food Allergies & Intolerances: no    Current Diet: Avoiding cold temperatures while on Oxaliplatin and avoids corn and popcorn  Current Nutrition Intake: Less than usual   Appetite: Fluctuating  Nutrition Route: PO  Oral Care: brushes BID  Activity level: ADL, plans to go back to work in Nov         24 Hr Diet Recall:   Breakfast: Corn flakes OR eggs OR toast with cream cheese OR yogurt   Lunch: tuna salad OR occasional fast food (sometimes Jacquelyn)   Snack: tortilla chips with humus OR fruit (strawberries or melon)   Dinner: red meat, chicken, or salmon with rice and vegetables (carrots or string beans)    Beverages: water (12oz x2-3), ginger ale (8oz x0-1), Sprite Zero (16oz x0-1)    Supplements:    Premier Protein Shake (11 oz, 160 kcal, 30 g pro) 1x daily      Oncology Nutrition-Estimated Needs    Flowsheet Row Nutrition from 9/7/2022 in [address] [place] [address] Nutrition from 8/10/2022 in [facility] Oncology Dietitian Dwayne   Weight type used Actual weight Actual weight   Weight in kilograms (kg) used for estimated needs 112 3 kg --   Weight in kilograms (kg) used for estimated needs -- 110 7 kg   Energy needs formula:  30-35 kcal/kg 35-40 kcal/kg   Energy needs based on 30 kcal/k kcal --   Energy needs based on 35 kcal/k kcal --   Energy needs based on 35 kcal/kg: -- 3875 kcal   Energy needs based on 40 kcal/kg: -- 4429 kcal   Protein needs formula: 1 2-1 5 g/kg 1 5-2 g/kg   Protein needs based on 1 2 g/k g --   Protein needs based on 1 5 g/kg 168 g --   Protein needs based on 1 5 g/kg -- 166 g   Protein needs based on 2 g/kg -- 221 g   Fluid needs formula: 30-35 mL/kg 30-35 mL/kg   Fluid needs based on 30 mL/kg 3369 mL 3327 mL   Fluid needs in ounces 114 oz 112 oz   Fluid needs based on 35 mL/kg 3931 mL 3882 mL   Fluid needs in ounces 133 oz 131 oz           Discussion & Intervention:   Jimenez Pete was evaluated today for an RD follow up regarding wt loss and diet post colostomy  Jimenez Pete is currently undergoing tx for colon cancer  Today Tayo Dumont explains that he is doing well and is able to eat without difficulty  He does say that his appetite has been fluctuating more recently, but he is able to continue to eat well  His weight has continued to gradually increase  He explains that he has an appointment with GI for consult for colonoscopy  After which he can have liver resection/ablation surgery and reversal of colostomy  He is hoping to have this completed in October so that he can return to work in November  Encouraged him to continue eating well to optimize nutrient intake, especially with possible surgery in the near future  Jimenez Pete is appreciative of support from nutrition services, and has no nutrition questions/concerns today  Moving forward, Jimenez Pete will continue current nutrition plan of care, we will follow up prn     Materials Provided: not applicable  All questions and concerns addressed during todays visit  Walt Cantrell has RD contact information  Nutrition Diagnosis:    Increased Nutrient Needs (kcal & pro) related to increased demand for nutrients and disease state as evidenced by cancer dx and pt undergoing tx for cancer   Altered GI Function related to tx for cancer as evidenced by s/p colostomy  Monitoring & Evaluation:   Goals:  · weight maintenance/stabilization  · adequate nutrition impact symptom management  · pt to meet >/=75% estimated nutrition needs daily  · adequate glycemic control    · Progress Towards Goals: Progressing    Nutrition Rx & Recommendations:  · Diet: High Calorie, High Protein (for high calorie foods see pages 52-53, and for high protein foods see pages 49-51 in your Eating Hints book)  · Keep a daily food journal (pen/paper, bossman such as [employer])  · Small, frequent meals/snacks may be easier to tolerate than 3 large daily meals  Aim for 5-6 small meals per day (every 2-3 hours)  · Include protein at all meals/snacks  · Include a variety of foods (as tolerated/allowed by diet)  · Stay hydrated by sipping fluids of choice/tolerance throughout the day  · Alter food choices and eating patterns to accommodate changing needs  · Weigh yourself regularly  If you notice weight loss, make an effort to increase your daily food/calorie intake  If you continue to notice loss after these efforts, reach out to your dietitian to establish a plan to stabilize weight  · Caution with gas-producing foods such as broccoli, cabbage, sauerkraut, [place] sprouts, cauliflower, corn, cucumbers, onions, peppers, beans, peas, lentils, apples, apple juice, avocado, and melons  Carbonated drinks, chewing gum, and drinking through a straw can also cause gas  Limiting lactose may help for those who are sensitive to milk products  · Spread carbohydrate intake throughout the day for glycemic control  · Continue Premier Protein daily       Follow Up Plan: PRN per patient request   Recommend Referral to Other Providers: none at this time

## 2022-09-07 NOTE — PROGRESS NOTES
Received notification in regards to patient urine protein resulted at 3+  Reviewed with Dr Gem Sarmiento  Holding MVASI per Dr Gem Sarmiento due to results  Plan updated and infusion RN notified

## 2022-09-07 NOTE — PATIENT INSTRUCTIONS
Nutrition Rx & Recommendations:  Diet: High Calorie, High Protein (for high calorie foods see pages 52-53, and for high protein foods see pages 49-51 in your Eating Hints book)  Keep a daily food journal (pen/paper, bossman such as [employer])  Small, frequent meals/snacks may be easier to tolerate than 3 large daily meals  Aim for 5-6 small meals per day (every 2-3 hours)  Include protein at all meals/snacks  Include a variety of foods (as tolerated/allowed by diet)  Stay hydrated by sipping fluids of choice/tolerance throughout the day  Alter food choices and eating patterns to accommodate changing needs  Weigh yourself regularly  If you notice weight loss, make an effort to increase your daily food/calorie intake  If you continue to notice loss after these efforts, reach out to your dietitian to establish a plan to stabilize weight  Caution with gas-producing foods such as broccoli, cabbage, sauerkraut, [place] sprouts, cauliflower, corn, cucumbers, onions, peppers, beans, peas, lentils, apples, apple juice, avocado, and melons  Carbonated drinks, chewing gum, and drinking through a straw can also cause gas  Limiting lactose may help for those who are sensitive to milk products  Spread carbohydrate intake throughout the day for glycemic control  Continue Premier Protein daily       Follow Up Plan: PRN per patient request   Recommend Referral to Other Providers: none at this time

## 2022-09-08 ENCOUNTER — TELEPHONE (OUTPATIENT)
Dept: HEMATOLOGY ONCOLOGY | Facility: CLINIC | Age: 57
End: 2022-09-08

## 2022-09-08 NOTE — TELEPHONE ENCOUNTER
CALL RETURN FORM   Reason for patient call? Patient received a call today from oncology, but no voicemail  He sees Dr Hanh Galeano and Dr Ai Rosado  He is unsure who the call was from and would like to know what it was regarding  No note was in patients chart regarding a call made today   Patient's primary oncologist? Dr Ai Rosado / Dr Hanh Galeano    Name of person the patient was calling for? Dr Gtz/Dr Hanh Galeano   Any additional information to add, if applicable? Please call patient back and leave message if needed   Informed patient that the message will be forwarded to the team and someone will get back to them as soon as possible    Did you relay this information to the patient?  Yes

## 2022-09-09 ENCOUNTER — PATIENT OUTREACH (OUTPATIENT)
Dept: CASE MANAGEMENT | Facility: HOSPITAL | Age: 57
End: 2022-09-09

## 2022-09-09 ENCOUNTER — OFFICE VISIT (OUTPATIENT)
Dept: GASTROENTEROLOGY | Facility: CLINIC | Age: 57
End: 2022-09-09
Payer: COMMERCIAL

## 2022-09-09 ENCOUNTER — DOCUMENTATION (OUTPATIENT)
Dept: HEMATOLOGY ONCOLOGY | Facility: CLINIC | Age: 57
End: 2022-09-09

## 2022-09-09 ENCOUNTER — TELEPHONE (OUTPATIENT)
Dept: GASTROENTEROLOGY | Facility: CLINIC | Age: 57
End: 2022-09-09

## 2022-09-09 ENCOUNTER — TELEPHONE (OUTPATIENT)
Dept: HEMATOLOGY ONCOLOGY | Facility: CLINIC | Age: 57
End: 2022-09-09

## 2022-09-09 ENCOUNTER — HOSPITAL ENCOUNTER (OUTPATIENT)
Dept: INFUSION CENTER | Facility: CLINIC | Age: 57
Discharge: HOME | End: 2022-09-09

## 2022-09-09 VITALS
HEIGHT: 67 IN | SYSTOLIC BLOOD PRESSURE: 145 MMHG | HEART RATE: 75 BPM | WEIGHT: 248 LBS | DIASTOLIC BLOOD PRESSURE: 89 MMHG | BODY MASS INDEX: 38.92 KG/M2

## 2022-09-09 DIAGNOSIS — C18.4 MALIGNANT NEOPLASM OF TRANSVERSE COLON (HCC): Primary | ICD-10-CM

## 2022-09-09 DIAGNOSIS — C78.7 COLON CANCER METASTASIZED TO LIVER (HCC): ICD-10-CM

## 2022-09-09 DIAGNOSIS — C18.9 COLON CANCER METASTASIZED TO LIVER (HCC): ICD-10-CM

## 2022-09-09 DIAGNOSIS — R53.83 OTHER FATIGUE: ICD-10-CM

## 2022-09-09 DIAGNOSIS — C18.4 PRIMARY CANCER OF TRANSVERSE COLON (HCC): Primary | ICD-10-CM

## 2022-09-09 PROCEDURE — 3079F DIAST BP 80-89 MM HG: CPT | Performed by: INTERNAL MEDICINE

## 2022-09-09 PROCEDURE — 99204 OFFICE O/P NEW MOD 45 MIN: CPT | Performed by: INTERNAL MEDICINE

## 2022-09-09 PROCEDURE — 3077F SYST BP >= 140 MM HG: CPT | Performed by: INTERNAL MEDICINE

## 2022-09-09 RX ORDER — POLYETHYLENE GLYCOL 3350, SODIUM SULFATE ANHYDROUS, SODIUM BICARBONATE, SODIUM CHLORIDE, POTASSIUM CHLORIDE 236; 22.74; 6.74; 5.86; 2.97 G/4L; G/4L; G/4L; G/4L; G/4L
4 POWDER, FOR SOLUTION ORAL ONCE
Qty: 4000 ML | Refills: 0 | Status: SHIPPED | OUTPATIENT
Start: 2022-09-09 | End: 2022-10-25

## 2022-09-09 NOTE — PROGRESS NOTES
MSW met with pt in the infusion center today, he was very happy with his surgical consult with Dr John Romero and all of his imaging results  He talked about the anxiety leading up to getting the results but was smiling ear to ear when he told me that all of his lesions and tumors have shrunk considerably and he is likely able to have his colostomy reversed  He is thrilled with the results but also the timing, as his role in [employer] [facility] [employer] will be starting in November and he feels this will give him enough time to heal and be ready for the show  He talked about going on a family vacation in January and how much they are all looking forward to it, they are going on a cruise to [facility]   He is then planning on resuming his role with the [facility] in January  Otherwise, he is doing well and feeling well and was happy for the check in  I will continue to assist and support him as needed, no other concerns at this time

## 2022-09-09 NOTE — TELEPHONE ENCOUNTER
Hello! This is just an FYI -- patient is sched for MRI on Tuesday 9/13  The case is still pending MD review w/ his insurance  If there is no PA in place by Monday afternoon the pt may need to r/s  Thank you!

## 2022-09-09 NOTE — TELEPHONE ENCOUNTER
Called and spoke with pt  He just wanted to know if his my chart will update with his procedure schedule  He is scheduled for 9/16  I told him I am not sure how it updates, but that he is scheduled  He will call back if he has any further questions  Thank you

## 2022-09-09 NOTE — TELEPHONE ENCOUNTER
Pt called in to speak with [facility] regarding the schedule set up  He is requesting a call back

## 2022-09-09 NOTE — PROGRESS NOTES
[address] [facility] - Outpatient Consultation  Catracho Ledesma 61 y o  male MRN: [id number]  Encounter: [id number]          ASSESSMENT AND PLAN:      1  Primary cancer of transverse colon ([address] )  -     Colonoscopy; Future; Expected date: 09/09/2022  -     polyethylene glycol (Golytely) 4000 mL solution; Take 4,000 mL by mouth once for 1 dose Take according to instructions given by the office for colonoscopy bowel prep  2  Colon cancer metastasized to liver [facility])  -     Ambulatory referral to Gastroenterology  -     Colonoscopy; Future; Expected date: 09/09/2022  -     polyethylene glycol (Golytely) 4000 mL solution; Take 4,000 mL by mouth once for 1 dose Take according to instructions given by the office for colonoscopy bowel prep  Patient with metastatic transverse colon cancer complicated by obstruction, now on chemo infusion doing fair, he is advised colonoscopy before seeing the surgeon to plan further course of action  He denies any blood in stools  No evidence of acute abdomen ileus obstruction  Plan discussed and the patient, colonoscopy scheduled for next week at the hospital   Will do colonoscopy through call loop colostomy trying to go into proximal and distal loops, if need be examined through the anorectal area as well   ______________________________________________________________________    HPI:     Patient came in for evaluation of his metastatic colon cancer diagnosed in February when he was admitted with bowel obstruction found to have a mass in the proximal transverse colon requiring loop colostomy  Found to have metastatic lesion in the liver as well, according to him diagnosis based on on liver biopsy  Since then he has received infusions of chemotherapy, and the lesions have become much smaller, he is planning to see Dr Lola Norman surgical oncologist in couple of weeks to discuss options  He has a colostomy which is working fine, no apparent blood in there  Denies any chest pains nausea vomiting abdominal pain appetite is fair appetite is good, weight stable  Diet medications more than 10 pertinent systems some of the current records were reviewed  Never had a colonoscopy done  Denies any family history of colorectal cancer  REVIEW OF SYSTEMS:    CONSTITUTIONAL: Denies any fever, chills, rigors, and weight loss  HEENT: No earache or tinnitus  CARDIOVASCULAR: No chest pain or palpitations  RESPIRATORY: Denies any cough, hemoptysis, shortness of breath or dyspnea on exertion  GASTROINTESTINAL: As noted in the History of Present Illness  GENITOURINARY: Denies any hematuria or dysuria  NEUROLOGIC: No dizziness or vertigo  MUSCULOSKELETAL: Denies any joint swellings  SKIN: Denies skin rashes or itching  ENDOCRINE: Denies excessive thirst  Denies intolerance to heat or cold  PSYCHOSOCIAL: Denies depression or anxiety  Denies any recent memory loss  Historical Information   Past Medical History:   Diagnosis Date    Abdominal pain 3/12/2022    Acute renal failure ([place] [address] )     [id number] resolved    Diabetes mellitus ([place] [address] )     Enteritis 08/23/2016    Gastroparesis due to DM ([place] [address] ) 08/23/2016    GERD (gastroesophageal reflux disease)     Hernia, ventral 08/04/2016    Hyperlipidemia     Hypertension     Morbid obesity ([place] [address] ) 4/17/2018    Postoperative visit 3/2/2022    SIRS (systemic inflammatory response syndrome) ([place] [address] ) 3/12/2022    Stage 3a chronic kidney disease ([place] [address] ) 2/19/2022     Past Surgical History:   Procedure Laterality Date    COLOSTOMY N/A 2/20/2022    Procedure: COLOSTOMY LOOP, diverting;  Surgeon: Francia Li MD;  Location: [facility];  Service: General    ESOPHAGOGASTRODUODENOSCOPY N/A 8/24/2016    Procedure: ESOPHAGOGASTRODUODENOSCOPY (EGD); Surgeon: Catracho Tavares MD;  Location[facility];   Service:     IR PORT PLACEMENT  3/10/2022    KIDNEY STONE SURGERY      LIVER BIOPSY LAPAROSCOPIC N/A 2/20/2022    Procedure: DIAGNOSTIC LAPAROSCOPIC LIVER BIOPSY, DIVERTING LOOP COLOSTOMY ;  Surgeon: Alondra Resendez MD;  [facility];  Service: General    TONSILLECTOMY      UMBILICAL HERNIA REPAIR      UMBILICAL HERNIA REPAIR LAPAROSCOPIC N/A 4/26/2019    Procedure: LAPAROSCOPIC UMBILICAL HERNIA REPAIR;  Surgeon: Alondra Resendez MD;  [facility];  Service: General     Social History   Social History     Substance and Sexual Activity   Alcohol Use Not Currently    Comment: occasion     Social History     Substance and Sexual Activity   Drug Use No     Social History     Tobacco Use   Smoking Status Never Smoker   Smokeless Tobacco Never Used     Family History   Problem Relation Age of Onset    Diabetes Mother         mellitus    Lung cancer Mother     Coronary artery disease Father        Meds/Allergies       Current Outpatient Medications:     amLODIPine (NORVASC) 5 mg tablet    aspirin 81 MG tablet    atorvastatin (LIPITOR) 40 mg tablet    Cholecalciferol (VITAMIN D3 PO)    Continuous Blood Gluc Sensor (FreeStyle Parrish 14 Day Sensor) MISC    fluorouracil 5,255 mg in CADD/Elastomeric Infusion Device    glyBURIDE-metFORMIN (GLUCOVANCE) 5-500 MG per tablet    Insulin Pen Needle (B-D UF III MINI PEN NEEDLES) 31G X 5 MM MISC    Januvia 100 MG tablet    Lantus SoloStar 100 units/mL SOPN    lisinopril (ZESTRIL) 40 mg tablet    metoclopramide (REGLAN) 10 mg tablet    Multiple Vitamins-Minerals (multivitamin with minerals) tablet    Needle, Disp, (HYPODERMIC NEEDLE) 23G X 1-1/2" MISC    Ostomy Supplies MISC    pantoprazole (PROTONIX) 40 mg tablet    polyethylene glycol (Golytely) 4000 mL solution    ondansetron (ZOFRAN) 4 mg tablet    Allergies   Allergen Reactions    Shellfish-Derived Products - Food Allergy Anaphylaxis    Erythromycin GI Intolerance     Pt denies           Objective     Blood pressure 145/89, pulse 75, height 5' 7" (1 702 m), weight 112 kg (248 lb)   Body mass index is 38 84 kg/m²         PHYSICAL EXAM:      General Appearance:   Alert, cooperative, no distress   HEENT:   Normocephalic, atraumatic, anicteric  Neck:  Supple, symmetrical, trachea midline   Lungs:   Clear to auscultation bilaterally; no rales, rhonchi or wheezing; respirations unlabored    Heart[de-identified]   Regular rate and rhythm; no murmur  Abdomen:   Soft, non-tender, non-distended; normal bowel sounds; no masses, no organomegaly    Genitalia:   Deferred    Rectal:   Deferred    Extremities:  No cyanosis, clubbing or edema    Skin:  No jaundice, rashes, or lesions    Lymph nodes:  No palpable cervical lymphadenopathy        Lab Results:   No visits with results within 1 Day(s) from this visit     Latest known visit with results is:   Orders Only on 09/02/2022   Component Date Value    White Blood Cell Count 09/02/2022 7 3     Red Blood Cell Count 09/02/2022 3 77 (A)    Hemoglobin 09/02/2022 12 0 (A)    HCT 09/02/2022 36 5 (A)    MCV 09/02/2022 97     MCH 09/02/2022 31 8     MCHC 09/02/2022 32 9     RDW 09/02/2022 13 9     Platelet Count 62/68/2054 211     Neutrophils 09/02/2022 58     Lymphocytes 09/02/2022 26     Monocytes 09/02/2022 12     Eosinophils 09/02/2022 2     Basophils PCT 09/02/2022 1     Neutrophils (Absolute) 09/02/2022 4 3     Lymphocytes (Absolute) 09/02/2022 1 9     Monocytes (Absolute) 09/02/2022 0 9     Eosinophils (Absolute) 09/02/2022 0 2     Basophils ABS 09/02/2022 0 0     Immature Granulocytes 09/02/2022 1     Immature Granulocytes (A* 09/02/2022 0 1     Glucose, Random 09/02/2022 191 (A)    BUN 09/02/2022 16     Creatinine 09/02/2022 1 34 (A)    eGFR 09/02/2022 61     SL AMB BUN/CREATININE RA* 09/02/2022 12     Sodium 09/02/2022 139     Potassium 09/02/2022 3 9     Chloride 09/02/2022 104     CO2 09/02/2022 23     CALCIUM 09/02/2022 8 9     Protein, Total 09/02/2022 5 8 (A)    Albumin 09/02/2022 3 8     Globulin, Total 09/02/2022 2 0     Albumin/Globulin Ratio 09/02/2022 1 9     TOTAL BILIRUBIN 09/02/2022 0 6     Alk Phos Isoenzymes 09/02/2022 206 (A)    AST 09/02/2022 31     ALT 09/02/2022 23     Specific Gravity 09/02/2022 1 022     Ph 09/02/2022 5 5     Color UA 09/02/2022 Yellow     Urine Appearance 09/02/2022 Clear     Leukocyte Esterase 09/02/2022 Negative     Protein 09/02/2022 3+ (A)    Glucose, 24 HR Urine 09/02/2022 3+ (A)    Ketone, Urine 09/02/2022 Negative     Blood, Urine 09/02/2022 Negative     Bilirubin, Urine 09/02/2022 Negative     Urobilinogen Urine 09/02/2022 0 2     SL AMB NITRITES URINE, Q* 09/02/2022 Negative     Microscopic Examination 09/02/2022 See below:     SL AMB WBC, URINE 09/02/2022 None seen     RBC, Urine 09/02/2022 None seen     Epithelial Cells (non re* 09/02/2022 None seen     Casts 09/02/2022 None seen     Bacteria, Urine 09/02/2022 None seen          Radiology Results:   CT chest abdomen pelvis w contrast    Result Date: 8/25/2022  Narrative: CT CHEST, ABDOMEN AND PELVIS WITH IV CONTRAST INDICATION:   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  Dr Archana Mcqueen note from 6/15/2022 was reviewed  Patient has history of metastatic colon cancer with extensive liver metastases, status post colostomy and chemotherapy  COMPARISON:  Comparison primary made with the most recent study, the chest abdomen, and pelvic CT from 6/7/2022  Older CTs were also removed  TECHNIQUE: CT examination of the chest, abdomen and pelvis was performed  Scanning through the abdomen was performed in arterial, venous and delayed phases according a protocol spefically designed to evaluate upper abdominal viscera  Axial, sagittal, and coronal 2D reformatted images were created from the source data and submitted for interpretation  Radiation dose length product (DLP) for this visit:  2267 mGy-cm     This examination, like all CT scans performed in the [facility], was performed utilizing techniques to minimize radiation dose exposure, including the use of iterative reconstruction and automated exposure control  IV Contrast:  59 mL of iohexol (OMNIPAQUE) Enteric Contrast: Enteric contrast was not administered  FINDINGS: CHEST LUNGS: There are multiple pulmonary nodules, which will be described on series 3: Image 51, right upper lobe, solid, smooth bordered, 2 mm, this nodule has continued to decrease in size, measuring 4 mm 6/7/2022 CT, and 6 mm on the 2/19/2022 CT  This is probably a metastasis given its trend  The following nodules are small and have demonstrated no change over several studies, and are probably benign: Image 71, right upper lobe, solid, smooth bordered, 3 mm, unchanged  Image 75, lingular left upper lobe, solid, smooth bordered, 3 mm, unchanged  Image 81, right lower lobe, solid, smooth bordered, 4 mm, unchanged  Image 105, right lower lobe, solid, smooth bordered, 2 mm, unchanged  Image 107, right lower lobe, solid, smooth bordered, 3 mm, unchanged  There are no new pulmonary nodules  PLEURA:  Unremarkable  HEART/GREAT VESSELS: Heart is unremarkable for patient's age  No thoracic aortic aneurysm  There is a Port-A-Cath  MEDIASTINUM AND NITA:  Unremarkable  CHEST WALL AND LOWER NECK:  Unremarkable  ABDOMEN LIVER/BILIARY TREE:  There is continued improvement in multiple liver metastases  For example, a right lobe segment 7 lesion currently measures 3 5 cm, previously 5 0 cm (series 5 image 56 ) Another right lobe, segment 8 lesion currently measures 2 1 cm, previously 3 7 cm (series 5 image 56 ) Another right lobe segment 6 lesion currently measures 2 4 cm, previously 3 2 cm (series 5 image 80 ) GALLBLADDER:  There are gallstone(s) within the gallbladder, without pericholecystic inflammatory changes  SPLEEN:  Unremarkable  PANCREAS:  Unremarkable  ADRENAL GLANDS:  Unremarkable  KIDNEYS/URETERS:  Unremarkable  No hydronephrosis  Small simple cyst in the right kidney posteriorly, unchanged  No suspicious renal lesions  No hydronephrosis  STOMACH AND BOWEL:  Again seen is transverse colon loop colostomy  The distal transverse colon primary tumor is no longer clearly visible, and would have been seen on series 5 image 65-70 based on prior CT's  APPENDIX:  No findings to suggest appendicitis  ABDOMINOPELVIC CAVITY:  No ascites  No pneumoperitoneum  No lymphadenopathy  VESSELS:  Unremarkable for patient's age  PELVIS REPRODUCTIVE ORGANS:  Unremarkable for patient's age  URINARY BLADDER:  Unremarkable  ABDOMINAL WALL/INGUINAL REGIONS:  Unremarkable  OSSEOUS STRUCTURES:  No acute fracture or destructive osseous lesion  Again seen is a lipoma in the left thigh adductor musculature  Impression: The distal transverse colon primary tumor is no longer clearly visible  There is continued improvement in multiple liver metastases  Index lesion measurements as above  Again seen are multiple pulmonary nodules, with an originally 6 mm right upper lobe nodule currently only 2 mm (series 3 image 51) having demonstrated gradual decrease in size over several studies therefore consistent with treated metastasis  All of the other small nodules have been stable and are likely benign   Workstation performed: [id number]

## 2022-09-09 NOTE — TELEPHONE ENCOUNTER
[address] Assessment    Name: Cesar Anguiano  YOB: 1965  Last Height: 5' 7" (1 702 m)  Last weight: 112 kg (248 lb)  BMI: 38 84 kg/m²  Procedure: colon  Diagnosis: see order  Date of procedure: 9/15 or 9/16  Prep: golytely  Responsible : yes  Phone#: [phone]  Name completing form: Alessandro Mcclendon  Date form completed: 09/09/22      If the patient answers yes to any of these questions, schedule in a hospital  Are you pregnant: No  Do you rely on a wheelchair for mobility: No  Have you been diagnosed with End Stage Renal Disease (ESRD): No  Do you need oxygen during the day: No  Have you had a heart attack or stroke within the past three months: No  Have you had a seizure within the past three months: No  Have you ever been informed by anesthesia that you have a difficult airway: No  Additional Questions  Have you had any cardiac testing or are under the care of a Cardiologist (see cardiac list): No  Cardiac list:   Do you have an implanted cardiac defibrillator: No (Comment:  This patient should be scheduled in the hospital)    Have any bleeding problems, such as anemia or hemophilia (If patient has H&H result below 8, schedule in hospital   H&H must be within 30 days of procedure): No    Had an organ transplant within the past 3 months: No    Do you have any present infections: No  Do you get short of breath when walking a few blocks: No  Have you been diagnosed with diabetes: Yes  Comments (provide cardiac provider information if applicable):

## 2022-09-09 NOTE — PROGRESS NOTES
RECTAL/GI MULTIDISCIPLINARY CASE REVIEW    DATE: 9/8/2022      PRESENTING DOCTOR: Dr Adelaida Tyson      DIAGNOSIS: Colon cancer      Esther Neri  Jose Branch was presented at the Rectal/GI Multidisciplinary Conference today  PHYSICIAN RECOMMENDED PLAN:    -MRI of the liver to determine stage resection  MRI scheduled on 9/13/2022   -Patient to have colonoscopy, scheduled on 9/15/2022   -Evaluate for surgery  Patient is scheduled to see Dr Alex Linton (surgical oncology) on 9/20/2022   -Transition chemotherapy regimen to Xeloda oral pills, per patient's preference  Team agreed to plan  The final treatment plan will be left to the discretion of the patient and the treating physician  DISCLAIMERS:  TO THE TREATING PHYSICIAN:  This conference is a meeting of clinicians from various specialty areas who evaluate and discuss patients for whom a multidisciplinary treatment approach is being considered  Please note that the above opinion was a consensus of the conference attendees and is intended only to assist in quality care of the discussed patient  The responsibility for follow up on the input given during the conference, along with any final decisions regarding plan of care, is that of the patient and the patient's provider  Accordingly, appointments have only been recommended based on this information and have NOT been scheduled unless otherwise noted  TO THE PATIENT:  This summary is a brief record of major aspects of your cancer treatment  You may choose to share a copy with any of your doctors or nurses  However, this is not a detailed or comprehensive record of your care        NCCN guidelines were readily available for review at this discussion

## 2022-09-09 NOTE — PROGRESS NOTES
Pt to clinic for cadd disconnect  acdd ball appeards deflated  Pt offers no complaints today  Pt aware of next appointment  Pt port  flushed and de-accessed with positive blood returned  AVS was offered, pt declined  Pt ambulated out of clinic safely

## 2022-09-12 ENCOUNTER — TELEPHONE (OUTPATIENT)
Dept: GASTROENTEROLOGY | Facility: CLINIC | Age: 57
End: 2022-09-12

## 2022-09-12 NOTE — TELEPHONE ENCOUNTER
COVID-19 (coronavirus) NEGATIVE .  Discussed with patient. Requested return to work note.        Spoke to pt confirming pt's colonoscopy scheduled on 9/16/22 at [facility] with Dr Mata Forman   Informed [facility] would be calling the day prior with arrival time  Informed of clear liquid diet the day prior as well as the bowel cleansing preparation  Informed pt would need a  due to being under sedation day of procedure  Pt has instructions and did not have any questions  Advised pt to contact insurance company if has any questions regarding coverage of procedure

## 2022-09-13 ENCOUNTER — HOSPITAL ENCOUNTER (OUTPATIENT)
Dept: INFUSION CENTER | Facility: HOSPITAL | Age: 57
Discharge: HOME | End: 2022-09-13
Payer: COMMERCIAL

## 2022-09-13 ENCOUNTER — HOSPITAL ENCOUNTER (OUTPATIENT)
Dept: MRI IMAGING | Facility: HOSPITAL | Age: 57
Discharge: HOME | End: 2022-09-13
Attending: SURGERY
Payer: COMMERCIAL

## 2022-09-13 VITALS — TEMPERATURE: 97.6 F

## 2022-09-13 DIAGNOSIS — C78.7 COLON CANCER METASTASIZED TO LIVER (HCC): Primary | ICD-10-CM

## 2022-09-13 DIAGNOSIS — C18.4 MALIGNANT NEOPLASM OF TRANSVERSE COLON (HCC): ICD-10-CM

## 2022-09-13 DIAGNOSIS — C18.9 COLON CANCER METASTASIZED TO LIVER (HCC): Primary | ICD-10-CM

## 2022-09-13 DIAGNOSIS — C18.9 COLON CANCER METASTASIZED TO LIVER (HCC): ICD-10-CM

## 2022-09-13 DIAGNOSIS — C78.7 COLON CANCER METASTASIZED TO LIVER (HCC): ICD-10-CM

## 2022-09-13 DIAGNOSIS — R53.83 OTHER FATIGUE: Primary | ICD-10-CM

## 2022-09-13 PROCEDURE — A9585 GADOBUTROL INJECTION: HCPCS | Performed by: SURGERY

## 2022-09-13 PROCEDURE — 96523 IRRIG DRUG DELIVERY DEVICE: CPT

## 2022-09-13 PROCEDURE — G1004 CDSM NDSC: HCPCS

## 2022-09-13 PROCEDURE — 74183 MRI ABD W/O CNTR FLWD CNTR: CPT

## 2022-09-13 RX ADMIN — GADOBUTROL 11 ML: 604.72 INJECTION INTRAVENOUS at 12:32

## 2022-09-13 NOTE — PROGRESS NOTES
Pt port deaccessed after mri port flushed easily easily with + blood return   Pt d/c home in stable condition

## 2022-09-16 ENCOUNTER — ANESTHESIA EVENT (OUTPATIENT)
Dept: GASTROENTEROLOGY | Facility: HOSPITAL | Age: 57
End: 2022-09-16

## 2022-09-16 ENCOUNTER — HOSPITAL ENCOUNTER (OUTPATIENT)
Dept: GASTROENTEROLOGY | Facility: HOSPITAL | Age: 57
Setting detail: OUTPATIENT SURGERY
End: 2022-09-16
Attending: INTERNAL MEDICINE
Payer: COMMERCIAL

## 2022-09-16 ENCOUNTER — TELEPHONE (OUTPATIENT)
Dept: HEMATOLOGY ONCOLOGY | Facility: CLINIC | Age: 57
End: 2022-09-16

## 2022-09-16 ENCOUNTER — ANESTHESIA (OUTPATIENT)
Dept: GASTROENTEROLOGY | Facility: HOSPITAL | Age: 57
End: 2022-09-16

## 2022-09-16 ENCOUNTER — TELEPHONE (OUTPATIENT)
Dept: SURGICAL ONCOLOGY | Facility: CLINIC | Age: 57
End: 2022-09-16

## 2022-09-16 VITALS
TEMPERATURE: 97.3 F | SYSTOLIC BLOOD PRESSURE: 158 MMHG | DIASTOLIC BLOOD PRESSURE: 85 MMHG | HEART RATE: 62 BPM | BODY MASS INDEX: 35.76 KG/M2 | HEIGHT: 69 IN | WEIGHT: 241.4 LBS | OXYGEN SATURATION: 99 % | RESPIRATION RATE: 14 BRPM

## 2022-09-16 DIAGNOSIS — C18.4 PRIMARY CANCER OF TRANSVERSE COLON (HCC): ICD-10-CM

## 2022-09-16 DIAGNOSIS — C78.7 COLON CANCER METASTASIZED TO LIVER (HCC): ICD-10-CM

## 2022-09-16 DIAGNOSIS — C18.9 COLON CANCER METASTASIZED TO LIVER (HCC): ICD-10-CM

## 2022-09-16 LAB — GLUCOSE SERPL-MCNC: 150 MG/DL (ref 65–140)

## 2022-09-16 PROCEDURE — 88305 TISSUE EXAM BY PATHOLOGIST: CPT | Performed by: PATHOLOGY

## 2022-09-16 PROCEDURE — 82948 REAGENT STRIP/BLOOD GLUCOSE: CPT

## 2022-09-16 PROCEDURE — 88342 IMHCHEM/IMCYTCHM 1ST ANTB: CPT | Performed by: PATHOLOGY

## 2022-09-16 PROCEDURE — 45380 COLONOSCOPY AND BIOPSY: CPT | Performed by: INTERNAL MEDICINE

## 2022-09-16 PROCEDURE — 45381 COLONOSCOPY SUBMUCOUS NJX: CPT | Performed by: INTERNAL MEDICINE

## 2022-09-16 RX ORDER — LIDOCAINE HYDROCHLORIDE 20 MG/ML
INJECTION, SOLUTION EPIDURAL; INFILTRATION; INTRACAUDAL; PERINEURAL AS NEEDED
Status: DISCONTINUED | OUTPATIENT
Start: 2022-09-16 | End: 2022-09-16

## 2022-09-16 RX ORDER — PROPOFOL 10 MG/ML
INJECTION, EMULSION INTRAVENOUS AS NEEDED
Status: DISCONTINUED | OUTPATIENT
Start: 2022-09-16 | End: 2022-09-16

## 2022-09-16 RX ADMIN — LIDOCAINE HYDROCHLORIDE 100 MG: 20 INJECTION, SOLUTION EPIDURAL; INFILTRATION; INTRACAUDAL at 11:13

## 2022-09-16 RX ADMIN — PROPOFOL 30 MG: 10 INJECTION, EMULSION INTRAVENOUS at 11:20

## 2022-09-16 RX ADMIN — PROPOFOL 20 MG: 10 INJECTION, EMULSION INTRAVENOUS at 11:17

## 2022-09-16 RX ADMIN — PROPOFOL 50 MG: 10 INJECTION, EMULSION INTRAVENOUS at 11:32

## 2022-09-16 RX ADMIN — PROPOFOL 30 MG: 10 INJECTION, EMULSION INTRAVENOUS at 11:24

## 2022-09-16 RX ADMIN — PROPOFOL 50 MG: 10 INJECTION, EMULSION INTRAVENOUS at 11:37

## 2022-09-16 RX ADMIN — PROPOFOL 150 MG: 10 INJECTION, EMULSION INTRAVENOUS at 11:14

## 2022-09-16 RX ADMIN — PROPOFOL 50 MG: 10 INJECTION, EMULSION INTRAVENOUS at 11:27

## 2022-09-16 NOTE — TELEPHONE ENCOUNTER
Discussed CT and MRI findings with the patient as well as his colonoscopy results  Will plan on liver volumes and possible staged resection once liver volumes are obtained

## 2022-09-16 NOTE — SEDATION DOCUMENTATION
In Pre-op, Patient had 500ml Saline enema to cleanse the lower bowel portion  Patient transferred to GI room and the enema volume was suctioned by Dr Ruth Mcdermott in total Patient has Loop Colostomy; Procedure performed, Rectal are first, then patient rolled supine to complete Colonoscopy from Ostomy site   lc

## 2022-09-16 NOTE — TELEPHONE ENCOUNTER
Returned call to pt  Pt stated he had his colonoscopy today and was very concerned after speaking with the physician following the procedure  Pt is concerned that he has a new colon mass since his most recent MRI done on 9/13 did not show the colon mass  Explained to pt that Dr Adriana Damon would review the MRI and colonoscopy and all of this would be discussed in detail at his upcoming f/u appt on 9/20  Pt very anxious about results and plan moving forward

## 2022-09-16 NOTE — TELEPHONE ENCOUNTER
CALL TRANSFER   Reason for patient call? Patient calling about recent lab results    Patient's primary physician? Vero Holden RN call was transferred to and time it was transferred? Ric Ramirez 1:53PM 9/16   Informed patient that the message will be forwarded to the team and someone will get back to them as soon as possible    Did you relay this information to the patient?   yes

## 2022-09-16 NOTE — ANESTHESIA PREPROCEDURE EVALUATION
Procedure:  COLONOSCOPY    Relevant Problems   ANESTHESIA (within normal limits)      CARDIO   (+) Benign essential hypertension   (+) Mixed hyperlipidemia      ENDO   (+) Type 2 diabetes mellitus with hyperlipidemia (HCC)   (+) Type 2 diabetes mellitus without complication, with long-term current use of insulin (HCC)   (-) Hyperthyroidism   (-) Hypothyroidism      GI/HEPATIC   (+) Colon cancer metastasized to liver (HCC)   (+) Malignant neoplasm of transverse colon (HCC)   (-) Gastroesophageal reflux disease      /RENAL (within normal limits)      HEMATOLOGY   (+) Iron deficiency anemia, unspecified   (+) Microcytic anemia      MUSCULOSKELETAL (within normal limits)      NEURO/PSYCH (within normal limits)      PULMONARY   (-) Asthma   (-) Sleep apnea        Physical Exam    Airway    Mallampati score: III  TM Distance: >3 FB  Neck ROM: full     Dental       Cardiovascular      Pulmonary      Other Findings        Anesthesia Plan  ASA Score- 3     Anesthesia Type- IV sedation with anesthesia with ASA Monitors  Additional Monitors:   Airway Plan:           Plan Factors-Exercise tolerance (METS): >4 METS  Chart reviewed  EKG reviewed  Existing labs reviewed  Patient summary reviewed  Patient is not a current smoker  Induction- intravenous  Postoperative Plan-     Informed Consent- Anesthetic plan and risks discussed with patient and spouse  I personally reviewed this patient with the CRNA  Discussed and agreed on the Anesthesia Plan with the CRNA  Cassy Abreu

## 2022-09-16 NOTE — ANESTHESIA POSTPROCEDURE EVALUATION
Post-Op Assessment Note    CV Status:  Stable  Pain Score: 0    Pain management: adequate     Mental Status:  Awake   Hydration Status:  Stable   PONV Controlled:  Controlled   Airway Patency:  Patent      Post Op Vitals Reviewed: Yes      Staff: CRNA         No complications documented      BP   142/80   Temp     Pulse  85   Resp   14   SpO2   99

## 2022-09-17 LAB
ALBUMIN SERPL-MCNC: 3.9 G/DL (ref 3.8–4.9)
ALBUMIN/GLOB SERPL: 1.8 {RATIO} (ref 1.2–2.2)
ALP SERPL-CCNC: 219 IU/L (ref 44–121)
ALT SERPL-CCNC: 23 IU/L (ref 0–44)
APPEARANCE UR: CLEAR
AST SERPL-CCNC: 33 IU/L (ref 0–40)
BACTERIA URNS QL MICRO: NORMAL
BASOPHILS # BLD AUTO: 0.1 X10E3/UL (ref 0–0.2)
BASOPHILS NFR BLD AUTO: 1 %
BILIRUB SERPL-MCNC: 0.9 MG/DL (ref 0–1.2)
BILIRUB UR QL STRIP: NEGATIVE
BUN SERPL-MCNC: 13 MG/DL (ref 6–24)
BUN/CREAT SERPL: 10 (ref 9–20)
CALCIUM SERPL-MCNC: 9.3 MG/DL (ref 8.7–10.2)
CASTS URNS QL MICRO: NORMAL /LPF
CHLORIDE SERPL-SCNC: 102 MMOL/L (ref 96–106)
CO2 SERPL-SCNC: 24 MMOL/L (ref 20–29)
COLOR UR: YELLOW
CREAT SERPL-MCNC: 1.26 MG/DL (ref 0.76–1.27)
EGFR: 66 ML/MIN/1.73
EOSINOPHIL # BLD AUTO: 0 X10E3/UL (ref 0–0.4)
EOSINOPHIL NFR BLD AUTO: 0 %
EPI CELLS #/AREA URNS HPF: NORMAL /HPF (ref 0–10)
ERYTHROCYTE [DISTWIDTH] IN BLOOD BY AUTOMATED COUNT: 13.8 % (ref 11.6–15.4)
GLOBULIN SER-MCNC: 2.2 G/DL (ref 1.5–4.5)
GLUCOSE SERPL-MCNC: 120 MG/DL (ref 65–99)
GLUCOSE UR QL: ABNORMAL
HCT VFR BLD AUTO: 38.2 % (ref 37.5–51)
HGB BLD-MCNC: 13.2 G/DL (ref 13–17.7)
HGB UR QL STRIP: ABNORMAL
IMM GRANULOCYTES # BLD: 0.1 X10E3/UL (ref 0–0.1)
IMM GRANULOCYTES NFR BLD: 1 %
KETONES UR QL STRIP: NEGATIVE
LEUKOCYTE ESTERASE UR QL STRIP: NEGATIVE
LYMPHOCYTES # BLD AUTO: 2.2 X10E3/UL (ref 0.7–3.1)
LYMPHOCYTES NFR BLD AUTO: 27 %
MCH RBC QN AUTO: 33.5 PG (ref 26.6–33)
MCHC RBC AUTO-ENTMCNC: 34.6 G/DL (ref 31.5–35.7)
MCV RBC AUTO: 97 FL (ref 79–97)
MICRO URNS: ABNORMAL
MONOCYTES # BLD AUTO: 1 X10E3/UL (ref 0.1–0.9)
MONOCYTES NFR BLD AUTO: 12 %
NEUTROPHILS # BLD AUTO: 4.7 X10E3/UL (ref 1.4–7)
NEUTROPHILS NFR BLD AUTO: 59 %
NITRITE UR QL STRIP: NEGATIVE
PH UR STRIP: 5.5 [PH] (ref 5–7.5)
PLATELET # BLD AUTO: 235 X10E3/UL (ref 150–450)
POTASSIUM SERPL-SCNC: 4.1 MMOL/L (ref 3.5–5.2)
PROT SERPL-MCNC: 6.1 G/DL (ref 6–8.5)
PROT UR QL STRIP: ABNORMAL
RBC # BLD AUTO: 3.94 X10E6/UL (ref 4.14–5.8)
RBC #/AREA URNS HPF: NORMAL /HPF (ref 0–2)
SODIUM SERPL-SCNC: 141 MMOL/L (ref 134–144)
SP GR UR: 1.02 (ref 1–1.03)
UROBILINOGEN UR STRIP-ACNC: 0.2 MG/DL (ref 0.2–1)
WBC # BLD AUTO: 8 X10E3/UL (ref 3.4–10.8)
WBC #/AREA URNS HPF: NORMAL /HPF (ref 0–5)

## 2022-09-20 ENCOUNTER — OFFICE VISIT (OUTPATIENT)
Dept: SURGICAL ONCOLOGY | Facility: CLINIC | Age: 57
End: 2022-09-20
Payer: COMMERCIAL

## 2022-09-20 VITALS
TEMPERATURE: 96.9 F | SYSTOLIC BLOOD PRESSURE: 120 MMHG | HEIGHT: 69 IN | BODY MASS INDEX: 36.29 KG/M2 | WEIGHT: 245 LBS | HEART RATE: 72 BPM | OXYGEN SATURATION: 97 % | RESPIRATION RATE: 16 BRPM | DIASTOLIC BLOOD PRESSURE: 72 MMHG

## 2022-09-20 DIAGNOSIS — C18.4 MALIGNANT NEOPLASM OF TRANSVERSE COLON (HCC): Primary | ICD-10-CM

## 2022-09-20 DIAGNOSIS — C78.7 COLON CANCER METASTASIZED TO LIVER (HCC): ICD-10-CM

## 2022-09-20 DIAGNOSIS — C18.9 COLON CANCER METASTASIZED TO LIVER (HCC): ICD-10-CM

## 2022-09-20 PROCEDURE — 99215 OFFICE O/P EST HI 40 MIN: CPT | Performed by: SURGERY

## 2022-09-20 RX ORDER — CEFAZOLIN SODIUM 2 G/50ML
2000 SOLUTION INTRAVENOUS ONCE
OUTPATIENT
Start: 2022-09-20

## 2022-09-20 RX ORDER — METRONIDAZOLE 500 MG/100ML
500 INJECTION, SOLUTION INTRAVENOUS ONCE
OUTPATIENT
Start: 2022-09-20

## 2022-09-20 NOTE — LETTER
September 20, 2022     Jan Taylor MD  [address] [place] [address]    Patient: Karlee Moreau   YOB: 1965   Date of Visit: 9/20/2022       Dear Dr Leah Chacon:    Thank you for referring Mira Navarrete to me for evaluation  Below are my notes for this consultation  If you have questions, please do not hesitate to call me  I look forward to following your patient along with you  Sincerely,        Sawyer Qureshi MD        CC: MD Sawyer Young MD  9/20/2022 10:51 AM  Incomplete               Surgical Oncology Follow Up       [facility] [address] [address]  [phone]    Karlee Moreau  1965  [id number]  [facility]  [address]  [facility] [place] [address]  [phone]    Diagnoses and all orders for this visit:    Malignant neoplasm of transverse colon [facility])  -     Case request operating room: TRANVERSE COLON RESECTION, REVERSAL OSTOMY, LIVER RESECTION/ABLATION, IOUS LIVER; Standing  -     Case request operating room: TRANVERSE COLON RESECTION, REVERSAL OSTOMY, LIVER RESECTION/ABLATION, IOUS LIVER    Colon cancer metastasized to liver ([facility] [address] )  -     Type and screen; Future  -     Prepare Leukoreduced RBC: 2 Units; Future  -     Comprehensive metabolic panel; Future  -     CBC and differential; Future  -     APTT; Future  -     Protime-INR; Future  -     HEMOGLOBIN A1C W/ EAG ESTIMATION; Future  -     EKG 12 lead;  Future    Other orders  -     Incentive spirometry; Standing  -     Insert and maintain IV line; Standing  -     Void On-Call to O R ; Standing  -     Place sequential compression device; Standing  -     metroNIDAZOLE (FLAGYL) IVPB (premix) 500 mg 100 mL  -     ceFAZolin (ANCEF) IVPB (premix in dextrose) 2,000 mg 50 mL        Chief Complaint   Patient presents with    Follow-up No follow-ups on file        Oncology History   Malignant neoplasm of transverse colon ([facility] [address] )   3/1/2022 Initial Diagnosis    Malignant neoplasm of transverse colon ([facility] [address] )     3/23/2022 -  Chemotherapy    fluorouracil (ADRUCIL), 400 mg/m2 = 905 mg, Intravenous, Once, 13 of 18 cycles  Administration: 905 mg (3/23/2022), 875 mg (4/6/2022), 875 mg (4/20/2022), 870 mg (5/4/2022), 880 mg (5/18/2022), 875 mg (6/1/2022), 875 mg (6/14/2022), 875 mg (6/29/2022), 875 mg (7/13/2022), 875 mg (7/27/2022), 875 mg (8/10/2022), 875 mg (8/24/2022), 875 mg (9/7/2022)  leucovorin calcium IVPB, 904 mg, Intravenous, Once, 13 of 18 cycles  Administration: 900 mg (3/23/2022), 876 mg (4/6/2022), 876 mg (4/20/2022), 868 mg (5/4/2022), 880 mg (5/18/2022), 876 mg (6/1/2022), 876 mg (6/14/2022), 876 mg (6/29/2022), 876 mg (7/13/2022), 876 mg (7/27/2022), 876 mg (8/10/2022), 876 mg (8/24/2022), 876 mg (9/7/2022)  oxaliplatin (ELOXATIN) chemo infusion, 85 mg/m2 = 192 1 mg, Intravenous, Once, 12 of 12 cycles  Administration: 192 1 mg (3/23/2022), 186 15 mg (4/6/2022), 186 15 mg (4/20/2022), 184 45 mg (5/4/2022), 187 mg (5/18/2022), 186 15 mg (6/1/2022), 186 15 mg (6/14/2022), 186 15 mg (6/29/2022), 186 15 mg (7/13/2022), 186 15 mg (7/27/2022), 186 15 mg (8/10/2022), 186 15 mg (8/24/2022)  fluorouracil (ADRUCIL) ambulatory infusion Soln, 1,200 mg/m2/day = 5,425 mg, Intravenous, Over 46 hours, 13 of 18 cycles  bevacizumab-awwb (MVASI) IVPB, 5 mg/kg = 545 mg (100 % of original dose 5 mg/kg), Intravenous, Once, 10 of 15 cycles  Dose modification: 5 mg/kg (original dose 5 mg/kg, Cycle 1)  Administration: 545 mg (3/23/2022), 500 mg (4/6/2022), 500 mg (4/20/2022), 500 mg (5/4/2022), 540 mg (5/18/2022), 535 mg (6/1/2022), 545 mg (6/14/2022), 545 mg (6/29/2022), 545 mg (7/13/2022)         Staging:  Metastatic colon cancer  Treatment history:  Loop colostomy with liver biopsy, February 2022  FOLFOX with bevacizumab  Current treatment:  Surgery pending  Disease status:  Stable    History of Present Illness:  Patient returns in follow-up     the MRI from September 13, 2022 revealed multiple hepatic metastasis  There were smaller lesions in the left lobe with the largest lesions on the right lobe  I personally reviewed his films  Liver volumes were then calculated based on his previous CT from August 2023  Left Lobe and Caudate Lobe: 498 cm3  Right Lobe: 1516 cm3  Total Liver Volume: 2014 cm3  Patient is currently well  Follow-up colonoscopy revealed a mass in mid transverse colon involving the entire circumference  Review of Systems  Complete ROS Surg Onc:   Complete ROS Surg Onc:   Constitutional: The patient denies new or recent history of general fatigue, no recent weight loss, no change in appetite  Eyes: No complaints of visual problems, no scleral icterus  ENT: no complaints of ear pain, no hoarseness, no difficulty swallowing,  no tinnitus and no new masses in head, oral cavity, or neck  Cardiovascular: No complaints of chest pain, no palpitations, no ankle edema  Respiratory: No complaints of shortness of breath, no cough  Gastrointestinal: No complaints of jaundice, no bloody stools, no pale stools  Genitourinary: No complaints of dysuria, no hematuria, no nocturia, no frequent urination, no urethral discharge  Musculoskeletal: No complaints of weakness, paralysis, joint stiffness or arthralgias  Integumentary: No complaints of rash, no new lesions  Neurological: No complaints of convulsions, no seizures, no dizziness  Hematologic/Lymphatic: No complaints of easy bruising  Endocrine:  No hot or cold intolerance  No polydipsia, polyphagia, or polyuria  Allergy/immunology:  No environmental allergies  No food allergies  Not immunocompromised  Skin:  No pallor or rash  No wound          Patient Active Problem List   Diagnosis    Type 2 diabetes mellitus without complication, with long-term current use of insulin ([place] [address] )    Benign essential hypertension    Mixed hyperlipidemia    Erectile dysfunction    Low testosterone    Obesity (BMI 30-39  9)    Testicular hypogonadism    Incarcerated umbilical hernia    Left ureteral calculus    Type 2 diabetes mellitus with hyperlipidemia (HCC)    Colonic mass    Microcytic anemia    Hypokalemia    Transaminitis    Thrombocytosis    Iron deficiency anemia, unspecified    Malignant neoplasm of transverse colon (HCC)    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)    Colon cancer metastasized to liver (HCC)    Colostomy prolapse (HCC)    Other fatigue     Past Medical History:   Diagnosis Date    Abdominal pain 3/12/2022    Acute renal failure ([place] [address] )     [id number] resolved    Diabetes mellitus ([place] [address] )     Enteritis 08/23/2016    Gastroparesis due to DM ([place] [address] ) 08/23/2016    GERD (gastroesophageal reflux disease)     Hernia, ventral 08/04/2016    Hyperlipidemia     Hypertension     Morbid obesity ([place] [address] ) 4/17/2018    Postoperative visit 3/2/2022    SIRS (systemic inflammatory response syndrome) ([place] [address] ) 3/12/2022    Stage 3a chronic kidney disease ([place] [address] ) 2/19/2022     Past Surgical History:   Procedure Laterality Date    COLOSTOMY N/A 2/20/2022    Procedure: COLOSTOMY LOOP, diverting;  Surgeon: John Owens MD;  [facility];  Service: General    ESOPHAGOGASTRODUODENOSCOPY N/A 8/24/2016    Procedure: ESOPHAGOGASTRODUODENOSCOPY (EGD); Surgeon: Rody Fountain MD;  [facility];   Service:     IR PORT PLACEMENT  3/10/2022    KIDNEY STONE SURGERY      LIVER BIOPSY LAPAROSCOPIC N/A 2/20/2022    Procedure: DIAGNOSTIC LAPAROSCOPIC LIVER BIOPSY, DIVERTING LOOP COLOSTOMY ;  Surgeon: John Owens MD;  [facility];  Service: General    TONSILLECTOMY      UMBILICAL HERNIA REPAIR      UMBILICAL HERNIA REPAIR LAPAROSCOPIC N/A 4/26/2019    Procedure: LAPAROSCOPIC UMBILICAL HERNIA REPAIR;  Surgeon: John Owens MD;  [facility]; Service: General     Family History   Problem Relation Age of Onset    Diabetes Mother         mellitus    Lung cancer Mother     Coronary artery disease Father      Social History     Socioeconomic History    Marital status: /Civil Union     Spouse name: Not on file    Number of children: Not on file    Years of education: Not on file    Highest education level: Not on file   Occupational History    Occupation: ACTOR     Employer: [employer]   Tobacco Use    Smoking status: Never Smoker    Smokeless tobacco: Never Used   Vaping Use    Vaping Use: Never used   Substance and Sexual Activity    Alcohol use: Not Currently     Comment: occasion    Drug use: No    Sexual activity: Yes     Partners: Female   Other Topics Concern    Not on file   Social History Narrative    Not on file     Social Determinants of Health     Financial Resource Strain: Not on file   Food Insecurity: No Food Insecurity    Worried About [address] [employer] in the Last Year: Never true    [employer] in the Last Year: Never true   Transportation Needs: No Transportation Needs    Lack of Transportation (Medical): No    Lack of Transportation (Non-Medical): No   Physical Activity: Insufficiently Active    Days of Exercise per Week: 5 days    Minutes of Exercise per Session: 10 min   Stress: No Stress Concern Present    Feeling of Stress : Not at all   Social Connections: Not on file   Intimate Partner Violence: Not on file   Housing Stability: Low Risk     Unable to Pay for Housing in the Last Year: No    Number of Places Lived in the Last Year: 1    Unstable Housing in the Last Year: No       Current Outpatient Medications:     amLODIPine (NORVASC) 5 mg tablet, TAKE 1 TABLET BY MOUTH TWICE A DAY, Disp: 180 tablet, Rfl: 0    aspirin 81 MG tablet, Take 81 mg by mouth daily  , Disp: , Rfl:     atorvastatin (LIPITOR) 40 mg tablet, TAKE 1 TABLET BY MOUTH EVERY DAY, Disp: 90 tablet, Rfl: 3   Cholecalciferol (VITAMIN D3 PO), Take 400 Units by mouth daily, Disp: , Rfl:     Continuous Blood Gluc Sensor (FreeStyle Parrish 14 Day Sensor) MISC, Use 1 each every 14 (fourteen) days, Disp: 2 each, Rfl: 6    [START ON 9/21/2022] fluorouracil 5,305 mg in CADD/Elastomeric Infusion Device, Infuse 5,305 mg (1,200 mg/m2/day x 2 21 m2) into a catheter in a vein via infusion device over 46 hours for 2 days  Do not start before September 21, 2022 , Disp: 1 each, Rfl: 0    glyBURIDE-metFORMIN (GLUCOVANCE) 5-500 MG per tablet, Take 1 tablet by mouth daily with breakfast Taking 1 tablet in the morning , Disp: , Rfl:     Insulin Pen Needle (B-D UF III MINI PEN NEEDLES) 31G X 5 MM MISC, Inject under the skin daily, Disp: 100 each, Rfl: 3    Januvia 100 MG tablet, TAKE 1 TABLET BY MOUTH EVERY DAY, Disp: 90 tablet, Rfl: 3    Lantus SoloStar 100 units/mL SOPN, INJECT 30 UNITS UNDER THE SKIN DAILY, Disp: 45 mL, Rfl: 1    lisinopril (ZESTRIL) 40 mg tablet, TAKE 1 TABLET BY MOUTH EVERY DAY, Disp: 90 tablet, Rfl: 3    metoclopramide (REGLAN) 10 mg tablet, TAKE 1 TABLET BY MOUTH TWICE A DAY, Disp: 180 tablet, Rfl: 0    Multiple Vitamins-Minerals (multivitamin with minerals) tablet, Take 1 tablet by mouth daily, Disp: , Rfl:     Needle, Disp, (HYPODERMIC NEEDLE) 23G X 1-1/2" MISC, by Does not apply route once a week, Disp: 10 each, Rfl: 6    ondansetron (ZOFRAN) 4 mg tablet, Take 4 mg by mouth every 6 (six) hours as needed, Disp: , Rfl:     Ostomy Supplies MISC, Use in the morning, Disp: 100 each, Rfl: 3    pantoprazole (PROTONIX) 40 mg tablet, TAKE 1 TABLET BY MOUTH TWICE A DAY, Disp: 180 tablet, Rfl: 3    polyethylene glycol (Golytely) 4000 mL solution, Take 4,000 mL by mouth once for 1 dose Take according to instructions given by the office for colonoscopy bowel prep , Disp: 4000 mL, Rfl: 0  Allergies   Allergen Reactions    Shellfish-Derived Products - Food Allergy Anaphylaxis    Erythromycin GI Intolerance     Pt denies     Vitals:    09/20/22 1020   BP: 120/72   Pulse: 72   Resp: 16   Temp: (!) 96 9 °F (36 1 °C)   SpO2: 97%       Physical Exam  Constitutional: General appearance: The Patient is well-developed and well-nourished who appears the stated age in no acute distress  Patient is pleasant and talkative  HEENT:  Normocephalic  Sclerae are anicteric  Mucous membranes are moist  Neck is supple without adenopathy  No JVD  Chest: The lungs are clear to auscultation  Cardiac: Heart is regular rate  Abdomen: Abdomen is soft, non-tender, non-distended and without masses  There is a functioning transverse colostomy    Extremities: There is no clubbing or cyanosis  There is no edema  Symmetric  Neuro: Grossly nonfocal  Gait is normal      Lymphatic: No evidence of cervical adenopathy bilaterally  No evidence of axillary adenopathy bilaterally  No evidence of inguinal adenopathy bilaterally  Skin: Warm, anicteric  Psych:  Patient is pleasant and talkative  Breasts:        Pathology:  [unfilled]    Labs:      Imaging  Colonoscopy    Result Date: 9/16/2022  Narrative: [address] [place] [address] Endoscopy [address] [place] [address] [phone] DATE OF SERVICE: 9/16/22 PHYSICIAN(S): Attending: Bela Blackwell MD Fellow: No Staff Documented INDICATION: Colon cancer metastasized to liver [facility]), [facility] POST-OP DIAGNOSIS: See the impression below  HISTORY: Prior colonoscopy: No prior colonoscopy  BOWEL PREPARATION: Golytely/Colyte/Trilyte; Miralax/Dulcolax PREPROCEDURE: Informed consent was obtained for the procedure, including sedation  Risks including but not limited to bleeding, infection, perforation, adverse drug reaction and aspiration were explained in detail  Also explained about less than 100% sensitivity with the exam and other alternatives  The patient was placed in the left lateral decubitus position   DETAILS OF PROCEDURE: Patient was taken to the procedure room where a time out was performed to confirm correct patient and correct procedure  The patient underwent monitored anesthesia care, which was administered by an anesthesia professional  The patient's blood pressure, heart rate, level of consciousness, oxygen and respirations were monitored throughout the procedure  A digital rectal exam was performed  A perianal exam was performed  The scope was introduced through the anus and advanced to the cecum  Retroflexion was performed in the rectum  The quality of bowel preparation was evaluated using the [facility] Bowel Preparation Scale with scores of: right colon = 2, transverse colon = 2, left colon = 2  The total BBPS score was 6  Bowel prep was adequate  The patient experienced no blood loss  The procedure was not difficult  The patient tolerated the procedure well  There were no apparent complications  ANESTHESIA INFORMATION: ASA: III Anesthesia Type: IV Sedation with Anesthesia MEDICATIONS: No administrations occurring from 1112 to 1143 on 09/16/22 FINDINGS: Invasive and malignant-appearing mass (not traversable) in the mid transverse colon 90 cm from the anal verge, covering the whole circumference; performed cold forceps biopsy; tattooed proximal to the finding with carbon black  Tattoo was placed 5 cm distal to the mass and 5 cm proximal to the mass  This mass was seen when the scope was inserted through the rectum advanced to transverse colon  Also when the colonoscope was inserted through the loop colostomy and advanced into transverse colon, this mass was seen  Healthy site of previous surgery (Loop colostomy) with no bleeding in the transverse colon  The scope was entered inserted into loop colostomy proximal loop advanced to cecum    The cecum ascending and proximal transverse colon were normal  Thereafter the scope was inserted into the loop colostomy distal loop advanced to midtransverse, the near obstructing mass was seen biopsied and tattoo placed 5 cm proximal to the mass  Scattered diverticulosis throughout the colon EVENTS: Procedure Events Event Event Time ENDO SCOPE OUT TIME 9/16/2022 11:37 AM SPECIMENS: ID Type Source Tests Collected by Time Destination 1 : Cold Biopsy Transverse colon Mass Tissue Colon TISSUE EXAM Don Forte MD 9/16/2022 11:18 AM  EQUIPMENT: Colonoscope -[id number]     Impression: Near obstructing mass was seen in the proximal to midtransverse colon  This was seen on scope was inserted to the anus advanced up to mid transverse and biopsied  This mass was also seen min the scope was inserted through the loop colostomy to this mass, more biopsies were taken, tattoos were placed 5 cm proximal and distal to this mass  RECOMMENDATION: Await pathology results  check biopsies follow-up with Surgical Oncology, and further management plans accordingly  Don Forte MD     MRI abdomen w wo contrast    Result Date: 9/14/2022  Narrative: MRI - ABDOMEN - WITH AND WITHOUT CONTRAST INDICATION: 21-year-old male with history of colon cancer and liver metastases  COMPARISON: CTs of the chest, abdomen, and pelvis dated 8/23/2022 and 6/7/2022  TECHNIQUE:  The following pulse sequences were obtained:  axial T1 weighted in/out of phase images, multiplanar T2 weighted images, axial DWI/ADC, pre-contrast axial T1 with fat saturation and dynamic multiphase post-contrast fat suppressed T1 weighted images    IV Contrast:  11 mL of Gadobutrol injection (SINGLE-DOSE) FINDINGS: LOWER CHEST:   Unremarkable  LIVER: Hypoenhancing liver metastases again demonstrated with some appearing stable and others appearing decreased in size since August   Index lesions include: --Segment VII lesion measuring 3 cm (11/152)--unchanged --Segment VIII lesion measuring 2 5 cm (11/152)--unchanged --Segment VI lesion measuring 1 1 cm (11/197) --previously 2 4 cm No new masses demonstrated  No steatosis or evidence for overload    Overall liver size is normal   Contour dimpling at the lateral right hepatic lobe related to volume loss of peripheral metastases  Otherwise contour is smooth  Hepatic and portal veins are patent  BILE DUCTS: No intrahepatic or extrahepatic bile duct dilation  GALLBLADDER:  Sludge is present  No hydrops, wall thickening, or pericholecystic inflammation  PANCREAS:  Unremarkable  ADRENAL GLANDS:  Normal  SPLEEN:  Normal  KIDNEYS/PROXIMAL URETERS: Renal collecting systems are decompressed  Bilateral Bosniak II subcentimeter cysts and nonenhancing right mid kidney Bosniak 1 cyst   No suspicious renal masses on either side  No perinephric collections  BOWEL:   Stomach is unremarkable for level distention  Question small sliding hiatal hernia  No dilated or inflamed loops of small bowel  Right upper quadrant loop colostomy is demonstrated  No colonic inflammation  Known distal transverse colonic mass PERITONEUM/RETROPERITONEUM: No mass  No ascites or encapsulated collection  LYMPH NODES: No abdominal lymphadenopathy  VASCULAR STRUCTURES:  Abdominal aorta and branch vessels appear normal in configuration and caliber  No aneurysm  No central venous thromboses demonstrated  ABDOMINAL WALL:  Degree of parastomal fat herniation at the right upper quadrant colostomy appears unchanged  No evidence for significant stomal compression/obstruction  OSSEOUS STRUCTURES:  No suspicious osseous lesion  Impression: 1  Hepatic metastases are, for the most part, unchanged in size since August, though at least one lesion in segment VI shows a short interval decrease in size  2   Known colonic mass is not well demonstrated by MRI   Workstation performed: [id number]     CT chest abdomen pelvis w contrast    Addendum Date: 9/19/2022 Addendum:   Estimated Liver Volumes as calculated on series 5: Left Lobe and Caudate Lobe: 498 cm3 Right Lobe: 1516 cm3 Total Liver Volume: 2014 cm3     Result Date: 9/19/2022  Narrative: CT CHEST, ABDOMEN AND PELVIS WITH IV CONTRAST INDICATION:   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  Dr Kati Hutchins note from 6/15/2022 was reviewed  Patient has history of metastatic colon cancer with extensive liver metastases, status post colostomy and chemotherapy  COMPARISON:  Comparison primary made with the most recent study, the chest abdomen, and pelvic CT from 6/7/2022  Older CTs were also removed  TECHNIQUE: CT examination of the chest, abdomen and pelvis was performed  Scanning through the abdomen was performed in arterial, venous and delayed phases according a protocol spefically designed to evaluate upper abdominal viscera  Axial, sagittal, and coronal 2D reformatted images were created from the source data and submitted for interpretation  Radiation dose length product (DLP) for this visit:  2267 mGy-cm   This examination, like all CT scans performed in the [facility], was performed utilizing techniques to minimize radiation dose exposure, including the use of iterative reconstruction and automated exposure control  IV Contrast:  59 mL of iohexol (OMNIPAQUE) Enteric Contrast: Enteric contrast was not administered  FINDINGS: CHEST LUNGS: There are multiple pulmonary nodules, which will be described on series 3: Image 51, right upper lobe, solid, smooth bordered, 2 mm, this nodule has continued to decrease in size, measuring 4 mm 6/7/2022 CT, and 6 mm on the 2/19/2022 CT  This is probably a metastasis given its trend  The following nodules are small and have demonstrated no change over several studies, and are probably benign: Image 71, right upper lobe, solid, smooth bordered, 3 mm, unchanged  Image 75, lingular left upper lobe, solid, smooth bordered, 3 mm, unchanged  Image 81, right lower lobe, solid, smooth bordered, 4 mm, unchanged  Image 105, right lower lobe, solid, smooth bordered, 2 mm, unchanged  Image 107, right lower lobe, solid, smooth bordered, 3 mm, unchanged  There are no new pulmonary nodules   PLEURA: Unremarkable  HEART/GREAT VESSELS: Heart is unremarkable for patient's age  No thoracic aortic aneurysm  There is a Port-A-Cath  MEDIASTINUM AND NITA:  Unremarkable  CHEST WALL AND LOWER NECK:  Unremarkable  ABDOMEN LIVER/BILIARY TREE:  There is continued improvement in multiple liver metastases  For example, a right lobe segment 7 lesion currently measures 3 5 cm, previously 5 0 cm (series 5 image 56 ) Another right lobe, segment 8 lesion currently measures 2 1 cm, previously 3 7 cm (series 5 image 56 ) Another right lobe segment 6 lesion currently measures 2 4 cm, previously 3 2 cm (series 5 image 80 ) GALLBLADDER:  There are gallstone(s) within the gallbladder, without pericholecystic inflammatory changes  SPLEEN:  Unremarkable  PANCREAS:  Unremarkable  ADRENAL GLANDS:  Unremarkable  KIDNEYS/URETERS:  Unremarkable  No hydronephrosis  Small simple cyst in the right kidney posteriorly, unchanged  No suspicious renal lesions  No hydronephrosis  STOMACH AND BOWEL:  Again seen is transverse colon loop colostomy  The distal transverse colon primary tumor is no longer clearly visible, and would have been seen on series 5 image 65-70 based on prior CT's  APPENDIX:  No findings to suggest appendicitis  ABDOMINOPELVIC CAVITY:  No ascites  No pneumoperitoneum  No lymphadenopathy  VESSELS:  Unremarkable for patient's age  PELVIS REPRODUCTIVE ORGANS:  Unremarkable for patient's age  URINARY BLADDER:  Unremarkable  ABDOMINAL WALL/INGUINAL REGIONS:  Unremarkable  OSSEOUS STRUCTURES:  No acute fracture or destructive osseous lesion  Again seen is a lipoma in the left thigh adductor musculature  Impression: The distal transverse colon primary tumor is no longer clearly visible  There is continued improvement in multiple liver metastases  Index lesion measurements as above   Again seen are multiple pulmonary nodules, with an originally 6 mm right upper lobe nodule currently only 2 mm (series 3 image 51) having demonstrated gradual decrease in size over several studies therefore consistent with treated metastasis  All of the other small nodules have been stable and are likely benign  Workstation performed: [id number]     I reviewed the above laboratory and imaging data  Discussion/Summary:  79-year-old male with metastatic colon cancer  Based on his current imaging he appears to have resectable disease  His liver volumes are borderline with his left lobe accounting for 24% of his liver  Also of concern is lung lesion  This is decreased in size  While this is not biopsy proven, it is still concerning  This lung lesion, if it is metastatic can be treated with continuing chemotherapy or possibly even SBRT  Since all his disease appears to be in his liver, I would recommend that he undergo reversal of his colostomy at the time of resection of his primary colon tumor  At that time we will address the left-sided liver lesions with a combination of resection/ablation  He will then require portal vein embolization to hypertrophy his left lobe and then in 6 weeks we will consider right lobectomy  The risks of reversal of his colostomy with colon resection, intraoperative ultrasound of the liver and resection/ablation were explained and included bleeding, infection, recurrence, need for further surgery, wound complications, adjacent organ injury, anastomotic leak, sepsis, mi, DVT, stroke, pulmonary embolism, and death  Informed consent was obtained  We will schedule this at our earliest mutual convenience  He is agreeable to this plan  All his questions were answered

## 2022-09-20 NOTE — PROGRESS NOTES
Surgical Oncology Follow Up       [facility]  [address] [facility] [id number] [phone]    Dwayne Carroll  1965  [id number]  [facility] [place] [id number] [facility] [id number] [phone]    Diagnoses and all orders for this visit:    Malignant neoplasm of transverse colon [facility])  -     Case request operating room: TRANVERSE COLON RESECTION, REVERSAL OSTOMY, LIVER RESECTION/ABLATION, IOUS LIVER; Standing  -     Case request operating room: TRANVERSE COLON RESECTION, REVERSAL OSTOMY, LIVER RESECTION/ABLATION, IOUS LIVER    Colon cancer metastasized to liver ([place] [address] )  -     Type and screen; Future  -     Prepare Leukoreduced RBC: 2 Units; Future  -     Comprehensive metabolic panel; Future  -     CBC and differential; Future  -     APTT; Future  -     Protime-INR; Future  -     HEMOGLOBIN A1C W/ EAG ESTIMATION; Future  -     EKG 12 lead; Future    Other orders  -     Incentive spirometry; Standing  -     Insert and maintain IV line; Standing  -     Void On-Call to O R ; Standing  -     Place sequential compression device; Standing  -     metroNIDAZOLE (FLAGYL) IVPB (premix) 500 mg 100 mL  -     ceFAZolin (ANCEF) IVPB (premix in dextrose) 2,000 mg 50 mL        Chief Complaint   Patient presents with    Follow-up       No follow-ups on file        Oncology History   Malignant neoplasm of transverse colon ([facility] )   3/1/2022 Initial Diagnosis    Malignant neoplasm of transverse colon ([facility] )     3/23/2022 -  Chemotherapy    fluorouracil (ADRUCIL), 400 mg/m2 = 905 mg, Intravenous, Once, 13 of 18 cycles  Administration: 905 mg (3/23/2022), 875 mg (4/6/2022), 875 mg (4/20/2022), 870 mg (5/4/2022), 880 mg (5/18/2022), 875 mg (6/1/2022), 875 mg (6/14/2022), 875 mg (6/29/2022), 875 mg (7/13/2022), 875 mg (7/27/2022), 875 mg (8/10/2022), 875 mg (8/24/2022), 875 mg (9/7/2022)  leucovorin calcium IVPB, 904 mg, Intravenous, Once, 13 of 18 cycles  Administration: 900 mg (3/23/2022), 876 mg (4/6/2022), 876 mg (4/20/2022), 868 mg (5/4/2022), 880 mg (5/18/2022), 876 mg (6/1/2022), 876 mg (6/14/2022), 876 mg (6/29/2022), 876 mg (7/13/2022), 876 mg (7/27/2022), 876 mg (8/10/2022), 876 mg (8/24/2022), 876 mg (9/7/2022)  oxaliplatin (ELOXATIN) chemo infusion, 85 mg/m2 = 192 1 mg, Intravenous, Once, 12 of 12 cycles  Administration: 192 1 mg (3/23/2022), 186 15 mg (4/6/2022), 186 15 mg (4/20/2022), 184 45 mg (5/4/2022), 187 mg (5/18/2022), 186 15 mg (6/1/2022), 186 15 mg (6/14/2022), 186 15 mg (6/29/2022), 186 15 mg (7/13/2022), 186 15 mg (7/27/2022), 186 15 mg (8/10/2022), 186 15 mg (8/24/2022)  fluorouracil (ADRUCIL) ambulatory infusion Soln, 1,200 mg/m2/day = 5,425 mg, Intravenous, Over 46 hours, 13 of 18 cycles  bevacizumab-awwb (MVASI) IVPB, 5 mg/kg = 545 mg (100 % of original dose 5 mg/kg), Intravenous, Once, 10 of 15 cycles  Dose modification: 5 mg/kg (original dose 5 mg/kg, Cycle 1)  Administration: 545 mg (3/23/2022), 500 mg (4/6/2022), 500 mg (4/20/2022), 500 mg (5/4/2022), 540 mg (5/18/2022), 535 mg (6/1/2022), 545 mg (6/14/2022), 545 mg (6/29/2022), 545 mg (7/13/2022)         Staging:  Metastatic colon cancer  Treatment history:  Loop colostomy with liver biopsy, February 2022  FOLFOX with bevacizumab  Current treatment:  Surgery pending  Disease status:  Stable    History of Present Illness:  Patient returns in follow-up     the MRI from September 13, 2022 revealed multiple hepatic metastasis  There were smaller lesions in the left lobe with the largest lesions on the right lobe  I personally reviewed his films  Liver volumes were then calculated based on his previous CT from August 2023  Left Lobe and Caudate Lobe: 498 cm3  Right Lobe: 1516 cm3  Total Liver Volume: 2014 cm3  Patient is currently well    Follow-up colonoscopy revealed a mass in mid transverse colon involving the entire circumference  Review of Systems  Complete ROS Surg Onc:   Complete ROS Surg Onc:   Constitutional: The patient denies new or recent history of general fatigue, no recent weight loss, no change in appetite  Eyes: No complaints of visual problems, no scleral icterus  ENT: no complaints of ear pain, no hoarseness, no difficulty swallowing,  no tinnitus and no new masses in head, oral cavity, or neck  Cardiovascular: No complaints of chest pain, no palpitations, no ankle edema  Respiratory: No complaints of shortness of breath, no cough  Gastrointestinal: No complaints of jaundice, no bloody stools, no pale stools  Genitourinary: No complaints of dysuria, no hematuria, no nocturia, no frequent urination, no urethral discharge  Musculoskeletal: No complaints of weakness, paralysis, joint stiffness or arthralgias  Integumentary: No complaints of rash, no new lesions  Neurological: No complaints of convulsions, no seizures, no dizziness  Hematologic/Lymphatic: No complaints of easy bruising  Endocrine:  No hot or cold intolerance  No polydipsia, polyphagia, or polyuria  Allergy/immunology:  No environmental allergies  No food allergies  Not immunocompromised  Skin:  No pallor or rash  No wound  Patient Active Problem List   Diagnosis    Type 2 diabetes mellitus without complication, with long-term current use of insulin ([address] )    Benign essential hypertension    Mixed hyperlipidemia    Erectile dysfunction    Low testosterone    Obesity (BMI 30-39  9)    Testicular hypogonadism    Incarcerated umbilical hernia    Left ureteral calculus    Type 2 diabetes mellitus with hyperlipidemia (HCC)    Colonic mass    Microcytic anemia    Hypokalemia    Transaminitis    Thrombocytosis    Iron deficiency anemia, unspecified    Malignant neoplasm of transverse colon (HCC)    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)    Colon cancer metastasized to liver ([place] [address] )    Colostomy prolapse ([place] [address] )    Other fatigue     Past Medical History:   Diagnosis Date    Abdominal pain 3/12/2022    Acute renal failure ([place] [address] )     [id number] resolved    Diabetes mellitus ([place] [address] )     Enteritis 08/23/2016    Gastroparesis due to DM ([place] [address] ) 08/23/2016    GERD (gastroesophageal reflux disease)     Hernia, ventral 08/04/2016    Hyperlipidemia     Hypertension     Morbid obesity ([place] [address] ) 4/17/2018    Postoperative visit 3/2/2022    SIRS (systemic inflammatory response syndrome) ([place] [address] ) 3/12/2022    Stage 3a chronic kidney disease ([place] [address] ) 2/19/2022     Past Surgical History:   Procedure Laterality Date    COLOSTOMY N/A 2/20/2022    Procedure: COLOSTOMY LOOP, diverting;  Surgeon: Ynes Heurta MD;  [facility];  Service: General    ESOPHAGOGASTRODUODENOSCOPY N/A 8/24/2016    Procedure: ESOPHAGOGASTRODUODENOSCOPY (EGD); Surgeon: Shad Leach MD;  [facility];   Service:     IR PORT PLACEMENT  3/10/2022    KIDNEY STONE SURGERY      LIVER BIOPSY LAPAROSCOPIC N/A 2/20/2022    Procedure: DIAGNOSTIC LAPAROSCOPIC LIVER BIOPSY, DIVERTING LOOP COLOSTOMY ;  Surgeon: Ynes Huerta MD;  [facility];  Service: General    TONSILLECTOMY      UMBILICAL HERNIA REPAIR      UMBILICAL HERNIA REPAIR LAPAROSCOPIC N/A 4/26/2019    Procedure: LAPAROSCOPIC UMBILICAL HERNIA REPAIR;  Surgeon: Ynes Huerta MD;  [facility];  Service: General     Family History   Problem Relation Age of Onset    Diabetes Mother         mellitus    Lung cancer Mother     Coronary artery disease Father      Social History     Socioeconomic History    Marital status: /Civil Union     Spouse name: Not on file    Number of children: Not on file    Years of education: Not on file    Highest education level: Not on file   Occupational History    Occupation: ACTOR     Employer: [employer]   Tobacco Use    Smoking status: Never Smoker    Smokeless tobacco: Never Used Vaping Use    Vaping Use: Never used   Substance and Sexual Activity    Alcohol use: Not Currently     Comment: occasion    Drug use: No    Sexual activity: Yes     Partners: Female   Other Topics Concern    Not on file   Social History Narrative    Not on file     Social Determinants of Health     Financial Resource Strain: Not on file   Food Insecurity: No Food Insecurity    Worried About Running Out of Food in the Last Year: Never true    Bib[place] of Food in the Last Year: Never true   Transportation Needs: No Transportation Needs    Lack of Transportation (Medical): No    Lack of Transportation (Non-Medical): No   Physical Activity: Insufficiently Active    Days of Exercise per Week: 5 days    Minutes of Exercise per Session: 10 min   Stress: No Stress Concern Present    Feeling of Stress : Not at all   Social Connections: Not on file   Intimate Partner Violence: Not on file   Housing Stability: Low Risk     Unable to Pay for Housing in the Last Year: No    Number of Places Lived in the Last Year: 1    Unstable Housing in the Last Year: No       Current Outpatient Medications:     amLODIPine (NORVASC) 5 mg tablet, TAKE 1 TABLET BY MOUTH TWICE A DAY, Disp: 180 tablet, Rfl: 0    aspirin 81 MG tablet, Take 81 mg by mouth daily  , Disp: , Rfl:     atorvastatin (LIPITOR) 40 mg tablet, TAKE 1 TABLET BY MOUTH EVERY DAY, Disp: 90 tablet, Rfl: 3    Cholecalciferol (VITAMIN D3 PO), Take 400 Units by mouth daily, Disp: , Rfl:     Continuous Blood Gluc Sensor (FreeStyle Parrish 14 Day Sensor) MISC, Use 1 each every 14 (fourteen) days, Disp: 2 each, Rfl: 6    [START ON 9/21/2022] fluorouracil 5,305 mg in CADD/Elastomeric Infusion Device, Infuse 5,305 mg (1,200 mg/m2/day x 2 21 m2) into a catheter in a vein via infusion device over 46 hours for 2 days  Do not start before September 21, 2022 , Disp: 1 each, Rfl: 0    glyBURIDE-metFORMIN (GLUCOVANCE) 5-500 MG per tablet, Take 1 tablet by mouth daily with breakfast Taking 1 tablet in the morning , Disp: , Rfl:     Insulin Pen Needle (B-D UF III MINI PEN NEEDLES) 31G X 5 MM MISC, Inject under the skin daily, Disp: 100 each, Rfl: 3    Januvia 100 MG tablet, TAKE 1 TABLET BY MOUTH EVERY DAY, Disp: 90 tablet, Rfl: 3    Lantus SoloStar 100 units/mL SOPN, INJECT 30 UNITS UNDER THE SKIN DAILY, Disp: 45 mL, Rfl: 1    lisinopril (ZESTRIL) 40 mg tablet, TAKE 1 TABLET BY MOUTH EVERY DAY, Disp: 90 tablet, Rfl: 3    metoclopramide (REGLAN) 10 mg tablet, TAKE 1 TABLET BY MOUTH TWICE A DAY, Disp: 180 tablet, Rfl: 0    Multiple Vitamins-Minerals (multivitamin with minerals) tablet, Take 1 tablet by mouth daily, Disp: , Rfl:     Needle, Disp, (HYPODERMIC NEEDLE) 23G X 1-1/2" MISC, by Does not apply route once a week, Disp: 10 each, Rfl: 6    ondansetron (ZOFRAN) 4 mg tablet, Take 4 mg by mouth every 6 (six) hours as needed, Disp: , Rfl:     Ostomy Supplies MISC, Use in the morning, Disp: 100 each, Rfl: 3    pantoprazole (PROTONIX) 40 mg tablet, TAKE 1 TABLET BY MOUTH TWICE A DAY, Disp: 180 tablet, Rfl: 3    polyethylene glycol (Golytely) 4000 mL solution, Take 4,000 mL by mouth once for 1 dose Take according to instructions given by the office for colonoscopy bowel prep , Disp: 4000 mL, Rfl: 0  Allergies   Allergen Reactions    Shellfish-Derived Products - Food Allergy Anaphylaxis    Erythromycin GI Intolerance     Pt denies     Vitals:    09/20/22 1020   BP: 120/72   Pulse: 72   Resp: 16   Temp: (!) 96 9 °F (36 1 °C)   SpO2: 97%       Physical Exam  Constitutional: General appearance: The Patient is well-developed and well-nourished who appears the stated age in no acute distress  Patient is pleasant and talkative  HEENT:  Normocephalic  Sclerae are anicteric  Mucous membranes are moist  Neck is supple without adenopathy  No JVD  Chest: The lungs are clear to auscultation  Cardiac: Heart is regular rate       Abdomen: Abdomen is soft, non-tender, non-distended and without masses  There is a functioning transverse colostomy    Extremities: There is no clubbing or cyanosis  There is no edema  Symmetric  Neuro: Grossly nonfocal  Gait is normal      Lymphatic: No evidence of cervical adenopathy bilaterally  No evidence of axillary adenopathy bilaterally  No evidence of inguinal adenopathy bilaterally  Skin: Warm, anicteric  Psych:  Patient is pleasant and talkative  Breasts:        Pathology:  [unfilled]    Labs:      Imaging  Colonoscopy    Result Date: 9/16/2022  Narrative: [facility] [address] [phone] DATE OF SERVICE: 9/16/22 PHYSICIAN(S): Attending: Veronica Arias MD Fellow: No Staff Documented INDICATION: Colon cancer metastasized to liver [facility]), Primary cancer of transverse colon ([facility] [address] ) POST-OP DIAGNOSIS: See the impression below  HISTORY: Prior colonoscopy: No prior colonoscopy  BOWEL PREPARATION: Golytely/Colyte/Trilyte; Miralax/Dulcolax PREPROCEDURE: Informed consent was obtained for the procedure, including sedation  Risks including but not limited to bleeding, infection, perforation, adverse drug reaction and aspiration were explained in detail  Also explained about less than 100% sensitivity with the exam and other alternatives  The patient was placed in the left lateral decubitus position  DETAILS OF PROCEDURE: Patient was taken to the procedure room where a time out was performed to confirm correct patient and correct procedure  The patient underwent monitored anesthesia care, which was administered by an anesthesia professional  The patient's blood pressure, heart rate, level of consciousness, oxygen and respirations were monitored throughout the procedure  A digital rectal exam was performed  A perianal exam was performed  The scope was introduced through the anus and advanced to the cecum  Retroflexion was performed in the rectum   The quality of bowel preparation was evaluated using the [place] Bowel Preparation Scale with scores of: right colon = 2, transverse colon = 2, left colon = 2  The total BBPS score was 6  Bowel prep was adequate  The patient experienced no blood loss  The procedure was not difficult  The patient tolerated the procedure well  There were no apparent complications  ANESTHESIA INFORMATION: ASA: III Anesthesia Type: IV Sedation with Anesthesia MEDICATIONS: No administrations occurring from 1112 to 1143 on 09/16/22 FINDINGS: Invasive and malignant-appearing mass (not traversable) in the mid transverse colon 90 cm from the anal verge, covering the whole circumference; performed cold forceps biopsy; tattooed proximal to the finding with carbon black  Tattoo was placed 5 cm distal to the mass and 5 cm proximal to the mass  This mass was seen when the scope was inserted through the rectum advanced to transverse colon  Also when the colonoscope was inserted through the loop colostomy and advanced into transverse colon, this mass was seen  Healthy site of previous surgery (Loop colostomy) with no bleeding in the transverse colon  The scope was entered inserted into loop colostomy proximal loop advanced to cecum  The cecum ascending and proximal transverse colon were normal  Thereafter the scope was inserted into the loop colostomy distal loop advanced to midtransverse, the near obstructing mass was seen biopsied and tattoo placed 5 cm proximal to the mass  Scattered diverticulosis throughout the colon EVENTS: Procedure Events Event Event Time ENDO SCOPE OUT TIME 9/16/2022 11:37 AM SPECIMENS: ID Type Source Tests Collected by Time Destination 1 : Cold Biopsy Transverse colon Mass Tissue Colon TISSUE EXAM Boy Spaulding MD 9/16/2022 11:18 AM  EQUIPMENT: Colonoscope -[id number]     Impression: Near obstructing mass was seen in the proximal to midtransverse colon  This was seen on scope was inserted to the anus advanced up to mid transverse and biopsied    This mass was also seen min the scope was inserted through the loop colostomy to this mass, more biopsies were taken, tattoos were placed 5 cm proximal and distal to this mass  RECOMMENDATION: Await pathology results  check biopsies follow-up with Surgical Oncology, and further management plans accordingly  Radhika Busch MD     MRI abdomen w wo contrast    Result Date: 9/14/2022  Narrative: MRI - ABDOMEN - WITH AND WITHOUT CONTRAST INDICATION: 56-year-old male with history of colon cancer and liver metastases  COMPARISON: CTs of the chest, abdomen, and pelvis dated 8/23/2022 and 6/7/2022  TECHNIQUE:  The following pulse sequences were obtained:  axial T1 weighted in/out of phase images, multiplanar T2 weighted images, axial DWI/ADC, pre-contrast axial T1 with fat saturation and dynamic multiphase post-contrast fat suppressed T1 weighted images    IV Contrast:  11 mL of Gadobutrol injection (SINGLE-DOSE) FINDINGS: LOWER CHEST:   Unremarkable  LIVER: Hypoenhancing liver metastases again demonstrated with some appearing stable and others appearing decreased in size since August   Index lesions include: --Segment VII lesion measuring 3 cm (11/152)--unchanged --Segment VIII lesion measuring 2 5 cm (11/152)--unchanged --Segment VI lesion measuring 1 1 cm (11/197) --previously 2 4 cm No new masses demonstrated  No steatosis or evidence for overload  Overall liver size is normal   Contour dimpling at the lateral right hepatic lobe related to volume loss of peripheral metastases  Otherwise contour is smooth  Hepatic and portal veins are patent  BILE DUCTS: No intrahepatic or extrahepatic bile duct dilation  GALLBLADDER:  Sludge is present  No hydrops, wall thickening, or pericholecystic inflammation  PANCREAS:  Unremarkable  ADRENAL GLANDS:  Normal  SPLEEN:  Normal  KIDNEYS/PROXIMAL URETERS: Renal collecting systems are decompressed    Bilateral Bosniak II subcentimeter cysts and nonenhancing right mid kidney Bosniak 1 cyst   No suspicious renal masses on either side  No perinephric collections  BOWEL:   Stomach is unremarkable for level distention  Question small sliding hiatal hernia  No dilated or inflamed loops of small bowel  Right upper quadrant loop colostomy is demonstrated  No colonic inflammation  Known distal transverse colonic mass PERITONEUM/RETROPERITONEUM: No mass  No ascites or encapsulated collection  LYMPH NODES: No abdominal lymphadenopathy  VASCULAR STRUCTURES:  Abdominal aorta and branch vessels appear normal in configuration and caliber  No aneurysm  No central venous thromboses demonstrated  ABDOMINAL WALL:  Degree of parastomal fat herniation at the right upper quadrant colostomy appears unchanged  No evidence for significant stomal compression/obstruction  OSSEOUS STRUCTURES:  No suspicious osseous lesion  Impression: 1  Hepatic metastases are, for the most part, unchanged in size since August, though at least one lesion in segment VI shows a short interval decrease in size  2   Known colonic mass is not well demonstrated by MRI  Workstation performed: [id number]     CT chest abdomen pelvis w contrast    Addendum Date: 9/19/2022 Addendum:   Estimated Liver Volumes as calculated on series 5: Left Lobe and Caudate Lobe: 498 cm3 Right Lobe: 1516 cm3 Total Liver Volume: 2014 cm3     Result Date: 9/19/2022  Narrative: CT CHEST, ABDOMEN AND PELVIS WITH IV CONTRAST INDICATION:   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  Dr Maria Del Carmen De Santiago note from 6/15/2022 was reviewed  Patient has history of metastatic colon cancer with extensive liver metastases, status post colostomy and chemotherapy  COMPARISON:  Comparison primary made with the most recent study, the chest abdomen, and pelvic CT from 6/7/2022  Older CTs were also removed  TECHNIQUE: CT examination of the chest, abdomen and pelvis was performed   Scanning through the abdomen was performed in arterial, venous and delayed phases according a protocol spefically designed to evaluate upper abdominal viscera  Axial, sagittal, and coronal 2D reformatted images were created from the source data and submitted for interpretation  Radiation dose length product (DLP) for this visit:  2267 mGy-cm   This examination, like all CT scans performed in the [facility], was performed utilizing techniques to minimize radiation dose exposure, including the use of iterative reconstruction and automated exposure control  IV Contrast:  59 mL of iohexol (OMNIPAQUE) Enteric Contrast: Enteric contrast was not administered  FINDINGS: CHEST LUNGS: There are multiple pulmonary nodules, which will be described on series 3: Image 51, right upper lobe, solid, smooth bordered, 2 mm, this nodule has continued to decrease in size, measuring 4 mm 6/7/2022 CT, and 6 mm on the 2/19/2022 CT  This is probably a metastasis given its trend  The following nodules are small and have demonstrated no change over several studies, and are probably benign: Image 71, right upper lobe, solid, smooth bordered, 3 mm, unchanged  Image 75, lingular left upper lobe, solid, smooth bordered, 3 mm, unchanged  Image 81, right lower lobe, solid, smooth bordered, 4 mm, unchanged  Image 105, right lower lobe, solid, smooth bordered, 2 mm, unchanged  Image 107, right lower lobe, solid, smooth bordered, 3 mm, unchanged  There are no new pulmonary nodules  PLEURA:  Unremarkable  HEART/GREAT VESSELS: Heart is unremarkable for patient's age  No thoracic aortic aneurysm  There is a Port-A-Cath  MEDIASTINUM AND NITA:  Unremarkable  CHEST WALL AND LOWER NECK:  Unremarkable  ABDOMEN LIVER/BILIARY TREE:  There is continued improvement in multiple liver metastases   For example, a right lobe segment 7 lesion currently measures 3 5 cm, previously 5 0 cm (series 5 image 56 ) Another right lobe, segment 8 lesion currently measures 2 1 cm, previously 3 7 cm (series 5 image 56 ) Another right lobe segment 6 lesion currently measures 2 4 cm, previously 3 2 cm (series 5 image 80 ) GALLBLADDER:  There are gallstone(s) within the gallbladder, without pericholecystic inflammatory changes  SPLEEN:  Unremarkable  PANCREAS:  Unremarkable  ADRENAL GLANDS:  Unremarkable  KIDNEYS/URETERS:  Unremarkable  No hydronephrosis  Small simple cyst in the right kidney posteriorly, unchanged  No suspicious renal lesions  No hydronephrosis  STOMACH AND BOWEL:  Again seen is transverse colon loop colostomy  The distal transverse colon primary tumor is no longer clearly visible, and would have been seen on series 5 image 65-70 based on prior CT's  APPENDIX:  No findings to suggest appendicitis  ABDOMINOPELVIC CAVITY:  No ascites  No pneumoperitoneum  No lymphadenopathy  VESSELS:  Unremarkable for patient's age  PELVIS REPRODUCTIVE ORGANS:  Unremarkable for patient's age  URINARY BLADDER:  Unremarkable  ABDOMINAL WALL/INGUINAL REGIONS:  Unremarkable  OSSEOUS STRUCTURES:  No acute fracture or destructive osseous lesion  Again seen is a lipoma in the left thigh adductor musculature  Impression: The distal transverse colon primary tumor is no longer clearly visible  There is continued improvement in multiple liver metastases  Index lesion measurements as above  Again seen are multiple pulmonary nodules, with an originally 6 mm right upper lobe nodule currently only 2 mm (series 3 image 51) having demonstrated gradual decrease in size over several studies therefore consistent with treated metastasis  All of the other small nodules have been stable and are likely benign  Workstation performed: [id number]     I reviewed the above laboratory and imaging data  Discussion/Summary:  76-year-old male with metastatic colon cancer  Based on his current imaging he appears to have resectable disease  His liver volumes are borderline with his left lobe accounting for 24% of his liver  Also of concern is lung lesion  This is decreased in size    While this is not biopsy proven, it is still concerning  This lung lesion, if it is metastatic can be treated with continuing chemotherapy or possibly even SBRT  Since all his disease appears to be in his liver, I would recommend that he undergo reversal of his colostomy at the time of resection of his primary colon tumor  At that time we will address the left-sided liver lesions with a combination of resection/ablation  He will then require portal vein embolization to hypertrophy his left lobe and then in 6 weeks we will consider right lobectomy  The risks of reversal of his colostomy with colon resection, intraoperative ultrasound of the liver and resection/ablation were explained and included bleeding, infection, recurrence, need for further surgery, wound complications, adjacent organ injury, anastomotic leak, sepsis, mi, DVT, stroke, pulmonary embolism, and death  Informed consent was obtained  We will schedule this at our earliest mutual convenience  He is agreeable to this plan  All his questions were answered

## 2022-09-21 ENCOUNTER — HOSPITAL ENCOUNTER (OUTPATIENT)
Dept: INFUSION CENTER | Facility: CLINIC | Age: 57
Discharge: HOME | End: 2022-09-21
Payer: COMMERCIAL

## 2022-09-21 VITALS
HEART RATE: 71 BPM | HEIGHT: 67 IN | BODY MASS INDEX: 38.61 KG/M2 | SYSTOLIC BLOOD PRESSURE: 122 MMHG | DIASTOLIC BLOOD PRESSURE: 82 MMHG | TEMPERATURE: 96.5 F | RESPIRATION RATE: 16 BRPM | WEIGHT: 246 LBS

## 2022-09-21 DIAGNOSIS — C18.4 MALIGNANT NEOPLASM OF TRANSVERSE COLON (HCC): Primary | ICD-10-CM

## 2022-09-21 DIAGNOSIS — R53.83 OTHER FATIGUE: ICD-10-CM

## 2022-09-21 PROCEDURE — G0498 CHEMO EXTEND IV INFUS W/PUMP: HCPCS

## 2022-09-21 PROCEDURE — 96367 TX/PROPH/DG ADDL SEQ IV INF: CPT

## 2022-09-21 PROCEDURE — 96413 CHEMO IV INFUSION 1 HR: CPT

## 2022-09-21 PROCEDURE — 96366 THER/PROPH/DIAG IV INF ADDON: CPT

## 2022-09-21 PROCEDURE — 96411 CHEMO IV PUSH ADDL DRUG: CPT

## 2022-09-21 RX ORDER — SODIUM CHLORIDE 9 MG/ML
20 INJECTION, SOLUTION INTRAVENOUS ONCE
Status: COMPLETED | OUTPATIENT
Start: 2022-09-21 | End: 2022-09-21

## 2022-09-21 RX ORDER — FLUOROURACIL 50 MG/ML
400 INJECTION, SOLUTION INTRAVENOUS ONCE
Status: COMPLETED | OUTPATIENT
Start: 2022-09-21 | End: 2022-09-21

## 2022-09-21 RX ORDER — DEXTROSE MONOHYDRATE 50 MG/ML
20 INJECTION, SOLUTION INTRAVENOUS ONCE
Status: COMPLETED | OUTPATIENT
Start: 2022-09-21 | End: 2022-09-21

## 2022-09-21 RX ADMIN — DEXAMETHASONE SODIUM PHOSPHATE: 10 INJECTION, SOLUTION INTRAMUSCULAR; INTRAVENOUS at 09:36

## 2022-09-21 RX ADMIN — BEVACIZUMAB-AWWB 560 MG: 400 INJECTION, SOLUTION INTRAVENOUS at 10:55

## 2022-09-21 RX ADMIN — SODIUM CHLORIDE 20 ML/HR: 0.9 INJECTION, SOLUTION INTRAVENOUS at 09:36

## 2022-09-21 RX ADMIN — DEXTROSE 20 ML/HR: 5 SOLUTION INTRAVENOUS at 11:50

## 2022-09-21 RX ADMIN — LEUCOVORIN CALCIUM 884 MG: 350 INJECTION, POWDER, LYOPHILIZED, FOR SOLUTION INTRAMUSCULAR; INTRAVENOUS at 11:52

## 2022-09-21 RX ADMIN — FLUOROURACIL 885 MG: 50 INJECTION, SOLUTION INTRAVENOUS at 14:00

## 2022-09-21 NOTE — PROGRESS NOTES
Pt to clinic for mvasi, 5FU push, 5fU cadd ball and leucovorin  Pt offers no complaints today  Tolerated infusions without complications  Neisha Rizvi RN aware that pt received mvasi today  Pt aware of next appointment for cadd disconnect  Pt port accessed flushed with positive blood return  AVS was offered, pt denied  Pt ambulated out of clinic safely

## 2022-09-22 ENCOUNTER — CONSULT (OUTPATIENT)
Dept: DERMATOLOGY | Facility: CLINIC | Age: 57
End: 2022-09-22
Payer: COMMERCIAL

## 2022-09-22 VITALS — BODY MASS INDEX: 37.28 KG/M2 | WEIGHT: 246 LBS | TEMPERATURE: 96.6 F | HEIGHT: 68 IN

## 2022-09-22 DIAGNOSIS — D48.5 NEOPLASM OF UNCERTAIN BEHAVIOR OF SKIN: Primary | ICD-10-CM

## 2022-09-22 DIAGNOSIS — L82.1 SEBORRHEIC KERATOSIS: ICD-10-CM

## 2022-09-22 PROCEDURE — 88341 IMHCHEM/IMCYTCHM EA ADD ANTB: CPT | Performed by: STUDENT IN AN ORGANIZED HEALTH CARE EDUCATION/TRAINING PROGRAM

## 2022-09-22 PROCEDURE — 99204 OFFICE O/P NEW MOD 45 MIN: CPT | Performed by: DERMATOLOGY

## 2022-09-22 PROCEDURE — 88305 TISSUE EXAM BY PATHOLOGIST: CPT | Performed by: STUDENT IN AN ORGANIZED HEALTH CARE EDUCATION/TRAINING PROGRAM

## 2022-09-22 PROCEDURE — 88305 TISSUE EXAM BY PATHOLOGIST: CPT | Performed by: PATHOLOGY

## 2022-09-22 PROCEDURE — 88342 IMHCHEM/IMCYTCHM 1ST ANTB: CPT | Performed by: STUDENT IN AN ORGANIZED HEALTH CARE EDUCATION/TRAINING PROGRAM

## 2022-09-22 PROCEDURE — 88342 IMHCHEM/IMCYTCHM 1ST ANTB: CPT | Performed by: PATHOLOGY

## 2022-09-22 PROCEDURE — 11102 TANGNTL BX SKIN SINGLE LES: CPT | Performed by: DERMATOLOGY

## 2022-09-22 NOTE — PROGRESS NOTES
[facility] Note     Patient Name: Marguerite Kelley  Encounter Date: 9/22/2022     Have you been cared for by a [facility] Dermatologist in the last 3 years and, if so, which one? No    · Have you traveled outside of the [address] [place] [address] in the past 3 months or outside of the [facility] in the last 2 weeks? No     May we call your Preferred Phone number to discuss your specific medical information? Yes     May we leave a detailed message that includes your specific medical information? Yes      Today's Chief Concerns:   Concern #1:  Spot of mark   Concern #2:      Past Medical History:  Have you personally ever had or currently have any of the following? · Skin cancer (such as Melanoma, Basal Cell Carcinoma, Squamous Cell Carcinoma? (If Yes, please provide more detail)- No  · Eczema: No  · Psoriasis: No  · HIV/AIDS: No  · Hepatitis B or C: No  · Tuberculosis: No  · Systemic Immunosuppression such as Diabetes, Biologic or Immunotherapy, Chemotherapy, Organ Transplantation, Bone Marrow Transplantation (If YES, please provide more detail): YES, Diabetes, Chemotherapy  · Radiation Treatment (If YES, please provide more detail): No  · Any other major medical conditions/concerns? (If Yes, which types)- YES, Colon Cancer    Social History:     What is/was your primary occupation? Unemployed     What are your hobbies/past-times? Family History:  Have any of your "first degree relatives" (parent, brother, sister, or child) had any of the following       · Skin cancer such as Melanoma or Merkel Cell Carcinoma or Pancreatic Cancer? No  · Eczema, Asthma, Hay Fever or Seasonal Allergies: No  · Psoriasis or Psoriatic Arthritis: No  · Do any other medical conditions seem to run in your family? If Yes, what condition and which relatives?   YES, Mother: Lung Cancer     Current Medications:   (please update all dermatological medications before printing patient's AVS!)      Current Outpatient Medications:     amLODIPine (NORVASC) 5 mg tablet, TAKE 1 TABLET BY MOUTH TWICE A DAY, Disp: 180 tablet, Rfl: 0    aspirin 81 MG tablet, Take 81 mg by mouth daily  , Disp: , Rfl:     atorvastatin (LIPITOR) 40 mg tablet, TAKE 1 TABLET BY MOUTH EVERY DAY, Disp: 90 tablet, Rfl: 3    Cholecalciferol (VITAMIN D3 PO), Take 400 Units by mouth daily, Disp: , Rfl:     Continuous Blood Gluc Sensor (FreeStyle Parrish 14 Day Sensor) MISC, Use 1 each every 14 (fourteen) days, Disp: 2 each, Rfl: 6    fluorouracil 5,305 mg in CADD/Elastomeric Infusion Device, Infuse 5,305 mg (1,200 mg/m2/day x 2 21 m2) into a catheter in a vein via infusion device over 46 hours for 2 days  Do not start before September 21, 2022 , Disp: 1 each, Rfl: 0    glyBURIDE-metFORMIN (GLUCOVANCE) 5-500 MG per tablet, Take 1 tablet by mouth daily with breakfast Taking 1 tablet in the morning , Disp: , Rfl:     Insulin Pen Needle (B-D UF III MINI PEN NEEDLES) 31G X 5 MM MISC, Inject under the skin daily, Disp: 100 each, Rfl: 3    Januvia 100 MG tablet, TAKE 1 TABLET BY MOUTH EVERY DAY, Disp: 90 tablet, Rfl: 3    Lantus SoloStar 100 units/mL SOPN, INJECT 30 UNITS UNDER THE SKIN DAILY, Disp: 45 mL, Rfl: 1    lisinopril (ZESTRIL) 40 mg tablet, TAKE 1 TABLET BY MOUTH EVERY DAY, Disp: 90 tablet, Rfl: 3    metoclopramide (REGLAN) 10 mg tablet, TAKE 1 TABLET BY MOUTH TWICE A DAY, Disp: 180 tablet, Rfl: 0    Multiple Vitamins-Minerals (multivitamin with minerals) tablet, Take 1 tablet by mouth daily, Disp: , Rfl:     Needle, Disp, (HYPODERMIC NEEDLE) 23G X 1-1/2" MISC, by Does not apply route once a week, Disp: 10 each, Rfl: 6    ondansetron (ZOFRAN) 4 mg tablet, Take 4 mg by mouth every 6 (six) hours as needed, Disp: , Rfl:     Ostomy Supplies MISC, Use in the morning, Disp: 100 each, Rfl: 3    pantoprazole (PROTONIX) 40 mg tablet, TAKE 1 TABLET BY MOUTH TWICE A DAY, Disp: 180 tablet, Rfl: 3    polyethylene glycol (Golytely) 4000 mL solution, Take 4,000 mL by mouth once for 1 dose Take according to instructions given by the office for colonoscopy bowel prep , Disp: 4000 mL, Rfl: 0  No current facility-administered medications for this visit  Review of Systems:  Have you recently had or currently have any of the following? If YES, what are you doing for the problem? · Fever, chills or unintended weight loss: No  · Sudden loss or change in your vision: No  · Nausea, vomiting or blood in your stool: No  · Painful or swollen joints: No  · Wheezing or cough: No  · Changing mole or non-healing wound: No  · Nosebleeds: YES, dry weather  · Excessive sweating: No  · Easy or prolonged bleeding? YES, 81 mg ASA  · Over the last 2 weeks, how often have you been bothered by the following problems? · Taking little interest or pleasure in doing things: 2 - Several days  · Feeling down, depressed, or hopeless: 2 - Several days  · Rapid heartbeat with epinephrine:  No    · FEMALES ONLY:    · Are you pregnant or planning to become pregnant? N/A  · Are you currently or planning to be nursing or breast feeding? N/A    · Any known allergies? Allergies   Allergen Reactions    Shellfish-Derived Products - Food Allergy Anaphylaxis    Erythromycin GI Intolerance     Pt denies         Physical Exam:     Was a chaperone (Derm Clinical Assistant) present throughout the entire Physical Exam? Yes     Did the Dermatology Team specifically  the patient on the importance of a Full Skin Exam to be sure that nothing is missed clinically?  Yes}  o Did the patient ultimately request or accept a Full Skin Exam?  NO  o Did the patient specifically refuse to have the areas "under-the-bra" examined by the Dermatologist? Dia oDmingo  o Did the patient specifically refuse to have the areas "under-the-underwear" examined by the Dermatologist? YES    CONSTITUTIONAL:   Vitals:    09/22/22 1101   Temp: (!) 96 6 °F (35 9 °C)   Weight: 112 kg (246 lb) Height: 5' 8" (1 727 m)         PSYCH: Normal mood and affect  EYES: Normal conjunctiva  ENT: Normal lips and oral mucosa  CARDIOVASCULAR: No edema  RESPIRATORY: Normal respirations  HEME/LYMPH/IMMUNO:  No regional lymphadenopathy except as noted below in "ASSESSMENT AND PLAN BY DIAGNOSIS"    SKIN:  FULL ORGAN SYSTEM EXAM   Hair, Scalp, Ears, Face Normal except as noted below in Assessment   Neck, Cervical Chain Nodes    Right Arm/Hand/Fingers    Left Arm/Hand/Fingers    Chest/Breasts/Axillae    Abdomen, Umbilicus    Back/Spine Normal except as noted below in Assessment   Groin/Genitalia/Buttocks NOT EXAMINED   Right Leg, Foot, Toes    Left Leg, Foot, Toes         Assessment and Plan by Diagnosis:    1  SEBORRHEIC KERATOSIS; NON-INFLAMED    Physical Exam:   Anatomic Location Affected:  Left back   Morphological Description:  Flat and raised, waxy, smooth to warty textured, yellow to brownish-grey to dark brown to blackish, discrete, "stuck-on" appearing papules   Pertinent Positives:   Pertinent Negatives: Additional History of Present Condition:  Patient reports new bumps on the skin  Denies itch, burn, pain, bleeding or ulceration  Present constantly; nothing seems to make it worse or better  No prior treatment  Assessment and Plan:  Based on a thorough discussion of this condition and the management approach to it (including a comprehensive discussion of the known risks, side effects and potential benefits of treatment), the patient (family) agrees to implement the following specific plan:   Benign, assurance provided  Seborrheic Keratosis  A seborrheic keratosis is a harmless warty spot that appears during adult life as a common sign of skin aging  Seborrheic keratoses can arise on any area of skin, covered or uncovered, with the usual exception of the palms and soles  They do not arise from mucous membranes  Seborrheic keratoses can have highly variable appearance        Seborrheic keratoses are extremely common  It has been estimated that over 90% of adults over the age of 61 years have one or more of them  They occur in males and females of all races, typically beginning to erupt in the 35s or 45s  They are uncommon under the age of 21 years  The precise cause of seborrhoeic keratoses is not known  Seborrhoeic keratoses are considered degenerative in nature  As time goes by, seborrheic keratoses tend to become more numerous  Some people inherit a tendency to develop a very large number of them; some people may have hundreds of them  The name "seborrheic keratosis" is misleading, because these lesions are not limited to a seborrhoeic distribution (scalp, mid-face, chest, upper back), nor are they formed from sebaceous glands, nor are they associated with sebum -- which is greasy  Seborrheic keratosis may also be called "SK," "Seb K," "basal cell papilloma," "senile wart," or "barnacle "      Researchers have noted:   Eruptive seborrhoeic keratoses can follow sunburn or dermatitis   Skin friction may be the reason they appear in body folds   Viral cause (e g , human papillomavirus) seems unlikely   Stable and clonal mutations or activation of FRFR3, PIK3CA, KRISTIAN, AKT1 and EGFR genes are found in seborrhoeic keratoses   Seborrhoeic keratosis can arise from solar lentigo   FRFR3 mutations also arise in solar lentigines  These mutations are associated with increased age and location on the head and neck, suggesting a role of ultraviolet radiation in these lesions   Seborrheic keratoses do not harbour tumour suppressor gene mutations   Epidermal growth factor receptor inhibitors, which are used to treat some cancers, often result in an increase in verrucal (warty) keratoses  There is no easy way to remove multiple lesions on a single occasion    Unless a specific lesion is "inflamed" and is causing pain or stinging/burning or is bleeding, most insurance companies do not authorize treatment  2  NEOPLASM OF UNCERTAIN BEHAVIOR OF SKIN    Physical Exam:   (Anatomic Location); (Size and Morphological Description); (Differential Diagnosis):  o A; Right upper back; 0 7 X 0 3 cm irregular pink brown papule; Diff Dx: Dermatofibroma    Pertinent Positives:   Pertinent Negatives: Additional History of Present Condition:  Found on exam     Assessment and Plan:   I have discussed with the patient that a sample of skin via a "skin biopsy would be potentially helpful to further make a specific diagnosis under the microscope   Based on a thorough discussion of this condition and the management approach to it (including a comprehensive discussion of the known risks, side effects and potential benefits of treatment), the patient (family) agrees to implement the following specific plan:    o Procedure:  Skin Biopsy  After a thorough discussion of treatment options and risk/benefits/alternatives (including but not limited to local pain, scarring, dyspigmentation, blistering, possible superinfection, and inability to confirm a diagnosis via histopathology), verbal and written consent were obtained and portion of the rash was biopsied for tissue sample  See below for consent that was obtained from patient and subsequent Procedure Note  PROCEDURE TANGENTIAL (SHAVE) BIOPSY NOTE:     Performing Physician: Petey Almaguer    Anatomic Location; Clinical Description with size (cm); Pre-Op Diagnosis:   A; Right upper back; 0 7 X 0 3 cm irregular pink brown papule; Diff Dx: Dermatofibroma    Post-op diagnosis: Same      Local anesthesia: 1% Lidocaine HCL      Topical anesthesia: None     Hemostasis: Aluminum chloride       After obtaining informed consent  at which time there was a discussion about the purpose of biopsy  and low risks of infection and bleeding  The area was prepped and draped in the usual fashion  Anesthesia was obtained with 1% lidocaine with epinephrine   A shave biopsy to an appropriate sampling depth was obtained by Shave (Dermablade or 15 blade) The resulting wound was covered with surgical ointment and bandaged appropriately  The patient tolerated the procedure well without complications and was without signs of functional compromise  Specimen has been sent for review by Dermatopathology  Standard post-procedure care has been explained and has been included in written form within the patient's copy of Informed Consent  INFORMED CONSENT DISCUSSION AND POST-OPERATIVE INSTRUCTIONS FOR PATIENT    I   RATIONALE FOR PROCEDURE  I understand that a skin biopsy allows the Dermatologist to test a lesion or rash under the microscope to obtain a diagnosis  It usually involves numbing the area with numbing medication and removing a small piece of skin; sometimes the area will be closed with sutures  In this specific procedure, sutures are not usually needed  If any sutures are placed, then they are usually need to be removed in 2 weeks or less  I understand that my Dermatologist recommends that a skin "shave" biopsy be performed today  A local anesthetic, similar to the kind that a dentist uses when filling a cavity, will be injected with a very small needle into the skin area to be sampled  The injected skin and tissue underneath "will go to sleep and become numb so no pain should be felt afterwards  An instrument shaped like a tiny "razor blade" (shave biopsy instrument) will be used to cut a small piece of tissue and skin from the area so that a sample of tissue can be taken and examined more closely under the microscope  A slight amount of bleeding will occur, but it will be stopped with direct pressure and a pressure bandage and any other appropriate methods  I understands that a scar will form where the wound was created  Surgical ointment will be applied to help protect the wound  Sutures are not usually needed      II   RISKS AND POTENTIAL COMPLICATIONS   I understand the risks and potential complications of a skin biopsy include but are not limited to the following:   Bleeding   Infection   Pain   Scar/keloid   Skin discoloration   Incomplete Removal   Recurrence   Nerve Damage/Numbness/Loss of Function   Allergic Reaction to Anesthesia   Biopsies are diagnostic procedures and based on findings additional treatment or evaluation may be required   Loss or destruction of specimen resulting in no additional findings    My Dermatologist has explained to me the nature of the condition, the nature of the procedure, and the benefits to be reasonably expected compared with alternative approaches  My Dermatologist has discussed the likelihood of major risks or complications of this procedure including the specific risks listed above, such as bleeding, infection, and scarring/keloid  I understand that a scar is expected after this procedure  I understand that my physician cannot predict if the scar will form a "keloid," which extends beyond the borders of the wound that is created  A keloid is a thick, painful, and bumpy scar  A keloid can be difficult to treat, as it does not always respond well to therapy, which includes injecting cortisone directly into the keloid every few weeks  While this usually reduces the pain and size of the scar, it does not eliminate it  I understand that photographs may be taken before and after the procedure  These will be maintained as part of the medical providers confidential records and may not be made available to me  I further authorize the medical provider to use the photographs for teaching purposes or to illustrate scientific papers, books, or lectures if in his/her judgment, medical research, education, or science may benefit from its use  I have had an opportunity to fully inquire about the risks and benefits of this procedure and its alternatives     I have been given ample time and opportunity to ask questions and to seek a second opinion if I wished to do so  I acknowledge that there have specifically been no guarantees as to the cosmetic results from the procedure  I am aware that with any procedure there is always the possibility of an unexpected complication  III  POST-PROCEDURAL CARE (WHAT YOU WILL NEED TO DO "AFTER THE BIOPSY" TO OPTIMIZE HEALING)     Keep the area clean and dry  Try NOT to remove the bandage or get it wet for the first 24 hours   Gently clean the area and apply surgical ointment (such as Vaseline petrolatum ointment, which is available "over the counter" and not a prescription) to the biopsy site for up to 2 weeks straight  This acts to protect the wound from the outside world   Sutures are not usually placed in this procedure  If any sutures were placed, return for suture removal as instructed (generally 1 week for the face, 2 weeks for the body)   Take Acetaminophen (Tylenol) for discomfort, if no contraindications  Ibuprofen or aspirin could make bleeding worse   Call our office immediately for signs of infection: fever, chills, increased redness, warmth, tenderness, discomfort/pain, or pus or foul smell coming from the wound  WHAT TO DO IF THERE IS ANY BLEEDING? If a small amount of bleeding is noticed, place a clean cloth over the area and apply firm pressure for ten minutes  Check the wound after 10 minutes of direct pressure  If bleeding persists, try one more time for an additional 10 minutes of direct pressure on the area  If the bleeding becomes heavier or does not stop after the second attempt, or if you have any other questions about this procedure, then please call your [facility] by calling [phone] (SKIN)  I hereby acknowledge that I have reviewed and verified the site with my Dermatologist and have requested and authorized my Dermatologist to proceed with the procedure                  Scribe Attestation    I,: Colleen Iniguez am acting as a scribe while in the presence of the attending physician :       I,:  Erica Rich MD personally performed the services described in this documentation    as scribed in my presence :

## 2022-09-22 NOTE — PATIENT INSTRUCTIONS
SEBORRHEIC KERATOSIS; NON-INFLAMED    Assessment and Plan:  Based on a thorough discussion of this condition and the management approach to it (including a comprehensive discussion of the known risks, side effects and potential benefits of treatment), the patient (family) agrees to implement the following specific plan:  Benign, assurance provided  Seborrheic Keratosis  A seborrheic keratosis is a harmless warty spot that appears during adult life as a common sign of skin aging  Seborrheic keratoses can arise on any area of skin, covered or uncovered, with the usual exception of the palms and soles  They do not arise from mucous membranes  Seborrheic keratoses can have highly variable appearance  Seborrheic keratoses are extremely common  It has been estimated that over 90% of adults over the age of 61 years have one or more of them  They occur in males and females of all races, typically beginning to erupt in the 35s or 45s  They are uncommon under the age of 21 years  The precise cause of seborrhoeic keratoses is not known  Seborrhoeic keratoses are considered degenerative in nature  As time goes by, seborrheic keratoses tend to become more numerous  Some people inherit a tendency to develop a very large number of them; some people may have hundreds of them  The name "seborrheic keratosis" is misleading, because these lesions are not limited to a seborrhoeic distribution (scalp, mid-face, chest, upper back), nor are they formed from sebaceous glands, nor are they associated with sebum -- which is greasy    Seborrheic keratosis may also be called "SK," "Seb K," "basal cell papilloma," "senile wart," or "barnacle "      Researchers have noted:  Eruptive seborrhoeic keratoses can follow sunburn or dermatitis  Skin friction may be the reason they appear in body folds  Viral cause (e g , human papillomavirus) seems unlikely  Stable and clonal mutations or activation of FRFR3, PIK3CA, KRISTIAN, AKT1 and EGFR genes are found in seborrhoeic keratoses  Seborrhoeic keratosis can arise from solar lentigo  FRFR3 mutations also arise in solar lentigines  These mutations are associated with increased age and location on the head and neck, suggesting a role of ultraviolet radiation in these lesions  Seborrheic keratoses do not harbour tumour suppressor gene mutations  Epidermal growth factor receptor inhibitors, which are used to treat some cancers, often result in an increase in verrucal (warty) keratoses  There is no easy way to remove multiple lesions on a single occasion  Unless a specific lesion is "inflamed" and is causing pain or stinging/burning or is bleeding, most insurance companies do not authorize treatment  2  NEOPLASM OF UNCERTAIN BEHAVIOR OF SKIN    Assessment and Plan:  I have discussed with the patient that a sample of skin via a "skin biopsy would be potentially helpful to further make a specific diagnosis under the microscope  Based on a thorough discussion of this condition and the management approach to it (including a comprehensive discussion of the known risks, side effects and potential benefits of treatment), the patient (family) agrees to implement the following specific plan:    Procedure:  Skin Biopsy  After a thorough discussion of treatment options and risk/benefits/alternatives (including but not limited to local pain, scarring, dyspigmentation, blistering, possible superinfection, and inability to confirm a diagnosis via histopathology), verbal and written consent were obtained and portion of the rash was biopsied for tissue sample  See below for consent that was obtained from patient and subsequent Procedure Note  PROCEDURE TANGENTIAL (SHAVE) BIOPSY NOTE:    After obtaining informed consent  at which time there was a discussion about the purpose of biopsy  and low risks of infection and bleeding  The area was prepped and draped in the usual fashion   Anesthesia was obtained with 1% lidocaine with epinephrine  A shave biopsy to an appropriate sampling depth was obtained by Shave (Dermablade or 15 blade) The resulting wound was covered with surgical ointment and bandaged appropriately  The patient tolerated the procedure well without complications and was without signs of functional compromise  Specimen has been sent for review by Dermatopathology  Standard post-procedure care has been explained and has been included in written form within the patient's copy of Informed Consent  INFORMED CONSENT DISCUSSION AND POST-OPERATIVE INSTRUCTIONS FOR PATIENT    I   RATIONALE FOR PROCEDURE  I understand that a skin biopsy allows the Dermatologist to test a lesion or rash under the microscope to obtain a diagnosis  It usually involves numbing the area with numbing medication and removing a small piece of skin; sometimes the area will be closed with sutures  In this specific procedure, sutures are not usually needed  If any sutures are placed, then they are usually need to be removed in 2 weeks or less  I understand that my Dermatologist recommends that a skin "shave" biopsy be performed today  A local anesthetic, similar to the kind that a dentist uses when filling a cavity, will be injected with a very small needle into the skin area to be sampled  The injected skin and tissue underneath "will go to sleep and become numb so no pain should be felt afterwards  An instrument shaped like a tiny "razor blade" (shave biopsy instrument) will be used to cut a small piece of tissue and skin from the area so that a sample of tissue can be taken and examined more closely under the microscope  A slight amount of bleeding will occur, but it will be stopped with direct pressure and a pressure bandage and any other appropriate methods  I understands that a scar will form where the wound was created  Surgical ointment will be applied to help protect the wound    Sutures are not usually needed  II   RISKS AND POTENTIAL COMPLICATIONS   I understand the risks and potential complications of a skin biopsy include but are not limited to the following:  Bleeding  Infection  Pain  Scar/keloid  Skin discoloration  Incomplete Removal  Recurrence  Nerve Damage/Numbness/Loss of Function  Allergic Reaction to Anesthesia  Biopsies are diagnostic procedures and based on findings additional treatment or evaluation may be required  Loss or destruction of specimen resulting in no additional findings    My Dermatologist has explained to me the nature of the condition, the nature of the procedure, and the benefits to be reasonably expected compared with alternative approaches  My Dermatologist has discussed the likelihood of major risks or complications of this procedure including the specific risks listed above, such as bleeding, infection, and scarring/keloid  I understand that a scar is expected after this procedure  I understand that my physician cannot predict if the scar will form a "keloid," which extends beyond the borders of the wound that is created  A keloid is a thick, painful, and bumpy scar  A keloid can be difficult to treat, as it does not always respond well to therapy, which includes injecting cortisone directly into the keloid every few weeks  While this usually reduces the pain and size of the scar, it does not eliminate it  I understand that photographs may be taken before and after the procedure  These will be maintained as part of the medical providers confidential records and may not be made available to me  I further authorize the medical provider to use the photographs for teaching purposes or to illustrate scientific papers, books, or lectures if in his/her judgment, medical research, education, or science may benefit from its use  I have had an opportunity to fully inquire about the risks and benefits of this procedure and its alternatives     I have been given ample time and opportunity to ask questions and to seek a second opinion if I wished to do so  I acknowledge that there have specifically been no guarantees as to the cosmetic results from the procedure  I am aware that with any procedure there is always the possibility of an unexpected complication  III  POST-PROCEDURAL CARE (WHAT YOU WILL NEED TO DO "AFTER THE BIOPSY" TO OPTIMIZE HEALING)    Keep the area clean and dry  Try NOT to remove the bandage or get it wet for the first 24 hours  Gently clean the area and apply surgical ointment (such as Vaseline petrolatum ointment, which is available "over the counter" and not a prescription) to the biopsy site for up to 2 weeks straight  This acts to protect the wound from the outside world  Sutures are not usually placed in this procedure  If any sutures were placed, return for suture removal as instructed (generally 1 week for the face, 2 weeks for the body)  Take Acetaminophen (Tylenol) for discomfort, if no contraindications  Ibuprofen or aspirin could make bleeding worse  Call our office immediately for signs of infection: fever, chills, increased redness, warmth, tenderness, discomfort/pain, or pus or foul smell coming from the wound  WHAT TO DO IF THERE IS ANY BLEEDING? If a small amount of bleeding is noticed, place a clean cloth over the area and apply firm pressure for ten minutes  Check the wound after 10 minutes of direct pressure  If bleeding persists, try one more time for an additional 10 minutes of direct pressure on the area  If the bleeding becomes heavier or does not stop after the second attempt, or if you have any other questions about this procedure, then please call [facility] by calling [phone] (SKIN)  I hereby acknowledge that I have reviewed and verified the site with my Dermatologist and have requested and authorized my Dermatologist to proceed with the procedure

## 2022-09-23 ENCOUNTER — HOSPITAL ENCOUNTER (OUTPATIENT)
Dept: INFUSION CENTER | Facility: CLINIC | Age: 57
Discharge: HOME | End: 2022-09-23

## 2022-09-23 DIAGNOSIS — R53.83 OTHER FATIGUE: ICD-10-CM

## 2022-09-23 DIAGNOSIS — C18.4 MALIGNANT NEOPLASM OF TRANSVERSE COLON (HCC): Primary | ICD-10-CM

## 2022-09-23 NOTE — PROGRESS NOTES
Pt to clinic for elastometric pump disconnect  Offers no complaints today  Pump appears empty and deflated  Pt tolerated pump disconnect without complications  Port de-accessed  Pt states he will be making port flush appointment when exiting at scheduling  AVS declined

## 2022-09-27 LAB
ABO GROUP BLD: NORMAL
ALBUMIN SERPL-MCNC: 3.8 G/DL (ref 3.8–4.9)
ALBUMIN/GLOB SERPL: 2.1 {RATIO} (ref 1.2–2.2)
ALP SERPL-CCNC: 183 IU/L (ref 44–121)
ALT SERPL-CCNC: 21 IU/L (ref 0–44)
APTT PPP: 27 SEC (ref 24–33)
AST SERPL-CCNC: 22 IU/L (ref 0–40)
BASOPHILS # BLD AUTO: 0 X10E3/UL (ref 0–0.2)
BASOPHILS NFR BLD AUTO: 1 %
BILIRUB SERPL-MCNC: 0.7 MG/DL (ref 0–1.2)
BUN SERPL-MCNC: 21 MG/DL (ref 6–24)
BUN/CREAT SERPL: 17 (ref 9–20)
CALCIUM SERPL-MCNC: 8.9 MG/DL (ref 8.7–10.2)
CHLORIDE SERPL-SCNC: 103 MMOL/L (ref 96–106)
CO2 SERPL-SCNC: 21 MMOL/L (ref 20–29)
CREAT SERPL-MCNC: 1.22 MG/DL (ref 0.76–1.27)
EGFR: 69 ML/MIN/1.73
EOSINOPHIL # BLD AUTO: 0.2 X10E3/UL (ref 0–0.4)
EOSINOPHIL NFR BLD AUTO: 3 %
ERYTHROCYTE [DISTWIDTH] IN BLOOD BY AUTOMATED COUNT: 13.4 % (ref 11.6–15.4)
EST. AVERAGE GLUCOSE BLD GHB EST-MCNC: 183 MG/DL
GLOBULIN SER-MCNC: 1.8 G/DL (ref 1.5–4.5)
GLUCOSE SERPL-MCNC: 249 MG/DL (ref 70–99)
HBA1C MFR BLD: 8 % (ref 4.8–5.6)
HCT VFR BLD AUTO: 37.6 % (ref 37.5–51)
HGB BLD-MCNC: 12.9 G/DL (ref 13–17.7)
IMM GRANULOCYTES # BLD: 0 X10E3/UL (ref 0–0.1)
IMM GRANULOCYTES NFR BLD: 1 %
INR PPP: 1 (ref 0.9–1.2)
LYMPHOCYTES # BLD AUTO: 1.7 X10E3/UL (ref 0.7–3.1)
LYMPHOCYTES NFR BLD AUTO: 28 %
MCH RBC QN AUTO: 33.3 PG (ref 26.6–33)
MCHC RBC AUTO-ENTMCNC: 34.3 G/DL (ref 31.5–35.7)
MCV RBC AUTO: 97 FL (ref 79–97)
MONOCYTES # BLD AUTO: 0.3 X10E3/UL (ref 0.1–0.9)
MONOCYTES NFR BLD AUTO: 5 %
NEUTROPHILS # BLD AUTO: 4 X10E3/UL (ref 1.4–7)
NEUTROPHILS NFR BLD AUTO: 62 %
PLATELET # BLD AUTO: 186 X10E3/UL (ref 150–450)
POTASSIUM SERPL-SCNC: 4.1 MMOL/L (ref 3.5–5.2)
PROT SERPL-MCNC: 5.6 G/DL (ref 6–8.5)
PROTHROMBIN TIME: 10.6 SEC (ref 9.1–12)
RBC # BLD AUTO: 3.87 X10E6/UL (ref 4.14–5.8)
RH BLD: POSITIVE
SODIUM SERPL-SCNC: 138 MMOL/L (ref 134–144)
WBC # BLD AUTO: 6.2 X10E3/UL (ref 3.4–10.8)

## 2022-09-27 PROCEDURE — 3052F HG A1C>EQUAL 8.0%<EQUAL 9.0%: CPT | Performed by: SURGERY

## 2022-09-29 DIAGNOSIS — E11.9 TYPE 2 DIABETES MELLITUS WITHOUT COMPLICATION, WITHOUT LONG-TERM CURRENT USE OF INSULIN (HCC): ICD-10-CM

## 2022-09-29 RX ORDER — INSULIN GLARGINE 100 [IU]/ML
30 INJECTION, SOLUTION SUBCUTANEOUS DAILY
Qty: 45 ML | Refills: 2 | Status: SHIPPED | OUTPATIENT
Start: 2022-09-29

## 2022-10-13 ENCOUNTER — OFFICE VISIT (OUTPATIENT)
Dept: LAB | Facility: HOSPITAL | Age: 57
End: 2022-10-13
Payer: COMMERCIAL

## 2022-10-13 DIAGNOSIS — C78.7 COLON CANCER METASTASIZED TO LIVER (HCC): ICD-10-CM

## 2022-10-13 DIAGNOSIS — C18.9 COLON CANCER METASTASIZED TO LIVER (HCC): ICD-10-CM

## 2022-10-13 LAB
ATRIAL RATE: 83 BPM
P AXIS: 42 DEGREES
PR INTERVAL: 202 MS
QRS AXIS: -9 DEGREES
QRSD INTERVAL: 150 MS
QT INTERVAL: 414 MS
QTC INTERVAL: 486 MS
T WAVE AXIS: -2 DEGREES
VENTRICULAR RATE: 83 BPM

## 2022-10-13 PROCEDURE — 93005 ELECTROCARDIOGRAM TRACING: CPT

## 2022-10-13 PROCEDURE — 93010 ELECTROCARDIOGRAM REPORT: CPT | Performed by: INTERNAL MEDICINE

## 2022-10-14 LAB
ABO GROUP BLD: NORMAL
RH BLD: POSITIVE

## 2022-10-21 NOTE — PROGRESS NOTES
Dr Moon Figueroa ordered cymbalta to assist with neuropathy  Take 1 capsule (30 mg total) by mouth daily for 7 days, THEN 2 capsules (60 mg total) daily for 21 days  My chart message to patient

## 2022-10-25 ENCOUNTER — ANESTHESIA EVENT (OUTPATIENT)
Dept: PERIOP | Facility: HOSPITAL | Age: 57
End: 2022-10-25

## 2022-10-25 ENCOUNTER — OFFICE VISIT (OUTPATIENT)
Dept: INTERNAL MEDICINE CLINIC | Facility: CLINIC | Age: 57
End: 2022-10-25
Payer: COMMERCIAL

## 2022-10-25 VITALS
BODY MASS INDEX: 37.92 KG/M2 | DIASTOLIC BLOOD PRESSURE: 80 MMHG | SYSTOLIC BLOOD PRESSURE: 134 MMHG | WEIGHT: 250.2 LBS | TEMPERATURE: 98.6 F | HEIGHT: 68 IN | OXYGEN SATURATION: 97 % | HEART RATE: 79 BPM

## 2022-10-25 DIAGNOSIS — I10 BENIGN ESSENTIAL HYPERTENSION: ICD-10-CM

## 2022-10-25 DIAGNOSIS — D50.0 IRON DEFICIENCY ANEMIA DUE TO CHRONIC BLOOD LOSS: ICD-10-CM

## 2022-10-25 DIAGNOSIS — E11.9 TYPE 2 DIABETES MELLITUS WITHOUT COMPLICATION, WITH LONG-TERM CURRENT USE OF INSULIN (HCC): Primary | ICD-10-CM

## 2022-10-25 DIAGNOSIS — Z79.4 TYPE 2 DIABETES MELLITUS WITHOUT COMPLICATION, WITH LONG-TERM CURRENT USE OF INSULIN (HCC): Primary | ICD-10-CM

## 2022-10-25 DIAGNOSIS — C18.9 COLON CANCER METASTASIZED TO LIVER (HCC): ICD-10-CM

## 2022-10-25 DIAGNOSIS — C78.7 COLON CANCER METASTASIZED TO LIVER (HCC): ICD-10-CM

## 2022-10-25 DIAGNOSIS — C18.4 MALIGNANT NEOPLASM OF TRANSVERSE COLON (HCC): ICD-10-CM

## 2022-10-25 DIAGNOSIS — E78.2 MIXED HYPERLIPIDEMIA: ICD-10-CM

## 2022-10-25 PROCEDURE — 99214 OFFICE O/P EST MOD 30 MIN: CPT | Performed by: INTERNAL MEDICINE

## 2022-10-25 NOTE — PROGRESS NOTES
INTERNAL MEDICINE OFFICE VISIT  [facility] [address] [place] [address]  Tel: [phone]      NAME: Eleazar Petty  AGE: 61 y o  SEX: male  : 1965   MRN: [id number]    DATE: 10/25/2022  TIME: 1:20 PM      Assessment and Plan:  1  Type 2 diabetes mellitus without complication, with long-term current use of insulin (HCC)   his A1c is high at 8 and his blood glucose levels have been running high  He has an abdominal surgery coming up in 2 weeks and I told him to increase the dose of the Lantus to 35 units daily  He will give me a call in about a week to see if it needs any more increase in dosage    2  Benign essential hypertension    Continue present medication    3  Mixed hyperlipidemia   continue atorvastatin    4  Malignant neoplasm of transverse colon [facility])   is going in for surgery on  for reversal of the colostomy and liver surgery    5  Colon cancer metastasized to liver ([address] )  FU with surgery    6  Iron deficiency anemia due to chronic blood loss  FU CBC      - Counseling Documentation: patient was counseled regarding: diagnostic results, instructions for management, risk factor reductions, prognosis, patient and family education, risks and benefits of treatment options and importance of compliance with treatment  - Medication Side Effects: Adverse side effects of medications were reviewed with the patient/guardian today  Return for follow up visit in 1 month or earlier, if needed  Chief Complaint:  Chief Complaint   Patient presents with   • Follow-up     3 months         History of Present Illness:     Type 2 diabetes is poorly controlled with hemoglobin A1c of 8  He thinks that the chemotherapy increases his blood sugar levels  Blood pressure and cholesterol are stable   He is going for surgery on  for reversal of colostomy and for liver met a stasis            Active Problem List:  Patient Active Problem List   Diagnosis   • Type 2 diabetes mellitus without complication, with long-term current use of insulin (HCC)   • Benign essential hypertension   • Mixed hyperlipidemia   • Erectile dysfunction   • Low testosterone   • Obesity (BMI 30-39  9)   • Testicular hypogonadism   • Incarcerated umbilical hernia   • Left ureteral calculus   • Type 2 diabetes mellitus with hyperlipidemia (HCC)   • Colonic mass   • Microcytic anemia   • Hypokalemia   • Transaminitis   • Thrombocytosis   • Iron deficiency anemia, unspecified   • Malignant neoplasm of transverse colon (HCC)   • 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 liver [facility])   • Colon cancer metastasized to liver [facility])   • Colostomy prolapse (HCC)   • Other fatigue         Past Medical History:  Past Medical History:   Diagnosis Date   • Abdominal pain 03/12/2022   • Acute renal failure ([place] [address] )     [id number] resolved   • Cancer ([place] [address] )    • Diabetes mellitus ([place] [address] )    • Enteritis 08/23/2016   • Gastroparesis due to DM ([facility] ) 08/23/2016   • GERD (gastroesophageal reflux disease)    • Hernia, ventral 08/04/2016   • Hyperlipidemia    • Hypertension    • Morbid obesity ([facility] ) 04/17/2018   • Postoperative visit 03/02/2022   • SIRS (systemic inflammatory response syndrome) ([place] [address] ) 03/12/2022   • Snoring    • Stage 3a chronic kidney disease ([place] [address] ) 02/19/2022         Past Surgical History:  Past Surgical History:   Procedure Laterality Date   • COLONOSCOPY     • COLOSTOMY N/A 02/20/2022    Procedure: COLOSTOMY LOOP, diverting;  Surgeon: Bishnu Sloan MD;  Location: [facility];  Service: General   • ESOPHAGOGASTRODUODENOSCOPY N/A 08/24/2016    Procedure: ESOPHAGOGASTRODUODENOSCOPY (EGD); Surgeon: Emily Ramos MD;  Location: [facility];   Service:    • IR PORT PLACEMENT  03/10/2022   • KIDNEY STONE SURGERY     • LIVER BIOPSY LAPAROSCOPIC N/A 02/20/2022    Procedure: DIAGNOSTIC LAPAROSCOPIC LIVER BIOPSY, DIVERTING LOOP COLOSTOMY ;  Surgeon: Bishnu Sloan MD;  Location: [facility]; Service: General   • TONSILLECTOMY     • UMBILICAL HERNIA REPAIR LAPAROSCOPIC N/A 04/26/2019    Procedure: LAPAROSCOPIC UMBILICAL HERNIA REPAIR;  Surgeon: Enedelia Swift MD;  Location: [facility];  Service: General         Family History:  Family History   Problem Relation Age of Onset   • Diabetes Mother         mellitus   • Lung cancer Mother    • Coronary artery disease Father          Social History:  Social History     Socioeconomic History   • Marital status: /Civil Union     Spouse name: None   • Number of children: None   • Years of education: None   • Highest education level: None   Occupational History   • Occupation: ACTOR     Employer: [employer]   Tobacco Use   • Smoking status: Never Smoker   • Smokeless tobacco: Never Used   Vaping Use   • Vaping Use: Never used   Substance and Sexual Activity   • Alcohol use: Never   • Drug use: No   • Sexual activity: Yes     Partners: Female   Other Topics Concern   • None   Social History Narrative   • None     Social Determinants of Health     Financial Resource Strain: Not on file   Food Insecurity: No Food Insecurity   • Worried About Running Out of Food in the Last Year: Never true   • Ran Out of Food in the Last Year: Never true   Transportation Needs: No Transportation Needs   • Lack of Transportation (Medical): No   • Lack of Transportation (Non-Medical): No   Physical Activity: Insufficiently Active   • Days of Exercise per Week: 5 days   • Minutes of Exercise per Session: 10 min   Stress: No Stress Concern Present   • Feeling of Stress : Not at all   Social Connections: Not on file   Intimate Partner Violence: Not on file   Housing Stability: Low Risk    • Unable to Pay for Housing in the Last Year: No   • Number of Places Lived in the Last Year: 1   • Unstable Housing in the Last Year: No         Allergies:   Allergies   Allergen Reactions   • Shellfish-Derived Products - Food Allergy Anaphylaxis   • Erythromycin GI Intolerance Medications:    Current Outpatient Medications:   •  amLODIPine (NORVASC) 5 mg tablet, TAKE 1 TABLET BY MOUTH TWICE A DAY, Disp: 180 tablet, Rfl: 0  •  aspirin 81 MG tablet, Take 81 mg by mouth daily  , Disp: , Rfl:   •  atorvastatin (LIPITOR) 40 mg tablet, TAKE 1 TABLET BY MOUTH EVERY DAY, Disp: 90 tablet, Rfl: 3  •  Cholecalciferol (VITAMIN D3 PO), Take 400 Units by mouth daily, Disp: , Rfl:   •  Continuous Blood Gluc Sensor (FreeStyle Parrish 14 Day Sensor) MISC, Use 1 each every 14 (fourteen) days, Disp: 2 each, Rfl: 6  •  DULoxetine (Cymbalta) 30 mg delayed release capsule, Take 1 capsule (30 mg total) by mouth daily for 7 days, THEN 2 capsules (60 mg total) daily for 21 days  , Disp: 49 capsule, Rfl: 0  •  glyBURIDE-metFORMIN (GLUCOVANCE) 5-500 MG per tablet, Take 1 tablet by mouth daily with breakfast Taking 1 tablet in the morning , Disp: , Rfl:   •  Insulin Pen Needle (B-D UF III MINI PEN NEEDLES) 31G X 5 MM MISC, Inject under the skin daily, Disp: 100 each, Rfl: 3  •  Januvia 100 MG tablet, TAKE 1 TABLET BY MOUTH EVERY DAY, Disp: 90 tablet, Rfl: 3  •  Lantus SoloStar 100 units/mL SOPN, INJECT 30 UNITS UNDER THE SKIN DAILY (Patient taking differently: Inject 35 Units under the skin daily), Disp: 45 mL, Rfl: 2  •  lisinopril (ZESTRIL) 40 mg tablet, TAKE 1 TABLET BY MOUTH EVERY DAY, Disp: 90 tablet, Rfl: 3  •  metoclopramide (REGLAN) 10 mg tablet, TAKE 1 TABLET BY MOUTH TWICE A DAY, Disp: 180 tablet, Rfl: 0  •  Multiple Vitamins-Minerals (multivitamin with minerals) tablet, Take 1 tablet by mouth daily, Disp: , Rfl:   •  Needle, Disp, (HYPODERMIC NEEDLE) 23G X 1-1/2" MISC, by Does not apply route once a week, Disp: 10 each, Rfl: 6  •  ondansetron (ZOFRAN) 4 mg tablet, Take 4 mg by mouth every 6 (six) hours as needed, Disp: , Rfl:   •  Ostomy Supplies MISC, Use in the morning, Disp: 100 each, Rfl: 3  •  pantoprazole (PROTONIX) 40 mg tablet, TAKE 1 TABLET BY MOUTH TWICE A DAY, Disp: 180 tablet, Rfl: 3      The following portions of the patient's history were reviewed and updated as appropriate: past medical history, past surgical history, family history, social history, allergies, current medications and active problem list       Review of Systems:  Constitutional: Denies fever, chills, weight gain, weight loss, fatigue  Eyes: Denies eye redness, eye discharge, double vision, change in visual acuity  ENT: Denies hearing loss, tinnitus, sneezing, nasal congestion, nasal discharge, sore throat   Respiratory: Denies cough, expectoration, hemoptysis, shortness of breath, wheezing  Cardiovascular: Denies chest pain, palpitations, lower extremity swelling, orthopnea, PND  Gastrointestinal: Denies abdominal pain, heartburn, nausea, vomiting, hematemesis, diarrhea, bloody stools  Genito-Urinary: Denies dysuria, frequency, difficulty in micturition, nocturia, incontinence  Musculoskeletal: Denies back pain, joint pain, muscle pain  Neurologic: Denies confusion, lightheadedness, syncope, headache, focal weakness, sensory changes, seizures  Endocrine: Denies polyuria, polydipsia, temperature intolerance  Allergy and Immunology: Denies hives, insect bite sensitivity  Hematological and Lymphatic: Denies bleeding problems, swollen glands   Psychological: Denies depression, suicidal ideation, anxiety, panic, mood swings  Dermatological: Denies pruritus, rash, skin lesion changes      Vitals:  Vitals:    10/25/22 1257   BP: 134/80   Pulse: 79   Temp: 98 6 °F (37 °C)   SpO2: 97%       Body mass index is 38 04 kg/m²  Weight (last 2 days)     Date/Time Weight    10/25/22 1257 113 (250 2)            Physical Examination:  General: Patient is not in acute distress  Awake, alert, responding to commands  No weight gain or loss  Head: Normocephalic  Atraumatic  Eyes: Conjunctiva and lids with no swelling, erythema or discharge  Both pupils normal sized, round and reactive to light   Sclera nonicteric  ENT: External examination of nose and ear normal  Otoscopic examination shows translucent tympanic membranes with patent canals without erythema  Oropharynx moist with no erythema, edema, exudate or lesions  Neck: Supple  JVP not raised  Trachea midline  No masses  No thyromegaly  Lungs: No signs of increased work of breathing or respiratory distress  Bilateral bronchovascular breath sounds with no crackles or rhonchi  Chest wall: No tenderness  Cardiovascular: Normal PMI  No thrills  Regular rate and rhythm  S1 and S2 normal  No murmur, rub or gallop  Gastrointestinal: Abdomen soft, nontender  No guarding or rigidity  Liver and spleen not palpable  Bowel sounds present  Neurologic: Cranial nerves II-XII intact   Cortical functions normal  Motor system - Reflexes 2+ and symmetrical  Sensations normal  Musculoskeletal: Gait normal  No joint tenderness  Integumentary: Skin normal with no rash or lesions  Lymphatic: No palpable lymph nodes in neck, axilla or groin  Extremities: No clubbing, cyanosis, edema or varicosities  Psychological: Judgement and insight normal  Mood and affect normal      Laboratory Results:  CBC with diff:   Lab Results   Component Value Date    WBC 6 2 09/26/2022    WBC 13 89 (H) 03/14/2022    WBC 10 8 07/21/2017    RBC 3 87 (L) 09/26/2022    RBC 3 07 (L) 03/14/2022    HGB 12 9 (L) 09/26/2022    HGB 7 4 (L) 03/14/2022    HGB 13 6 07/21/2017    HCT 37 6 09/26/2022    HCT 24 1 (L) 03/14/2022    HCT 39 3 07/21/2017    MCV 97 09/26/2022    MCV 69 (L) 03/14/2022    MCV 87 07/21/2017    MCH 33 3 (H) 09/26/2022    MCH 23 1 (L) 03/14/2022    MCH 30 2 07/21/2017    RDW 13 4 09/26/2022    RDW 17 9 (H) 03/14/2022    RDW 13 6 07/21/2017     09/26/2022     (H) 03/14/2022     07/21/2017       CMP:  Lab Results   Component Value Date    CREATININE 1 22 09/26/2022    CREATININE 1 15 03/14/2022    CREATININE 1 18 05/02/2017    BUN 21 09/26/2022     05/02/2017    K 4 1 09/26/2022     09/26/2022    CO2 21 09/26/2022    GLUCOSE 118 (H) 05/02/2017    PROT 6 2 04/10/2017    ALKPHOS 411 (H) 03/13/2022    ALKPHOS 94 04/10/2017    ALT 21 09/26/2022    AST 22 09/26/2022       Lab Results   Component Value Date    HGBA1C 8 0 (H) 09/26/2022       Lab Results   Component Value Date    TROPONINI <0 02 01/04/2020       Lipid Profile:   Lab Results   Component Value Date    CHOL 91 (L) 04/10/2017    CHOL 97 (L) 07/18/2016     Lab Results   Component Value Date    HDL 51 08/19/2022    HDL 45 08/26/2021     Lab Results   Component Value Date    LDLCALC 77 08/19/2022    LDLCALC 65 08/26/2021     Lab Results   Component Value Date    TRIG 157 (H) 08/19/2022    TRIG 75 08/26/2021       Imaging Results:  CT chest abdomen pelvis w contrast  Addendum: Estimated Liver Volumes as calculated on series 5:     Left Lobe and Caudate Lobe: 498 cm3   Right Lobe: 1516 cm3   Total Liver Volume: 2014 cm3  Narrative: CT CHEST, ABDOMEN AND PELVIS WITH IV CONTRAST    INDICATION:   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  Dr Mandeep Boucher note from 6/15/2022 was reviewed  Patient has history of metastatic colon cancer with extensive liver metastases, status post colostomy and chemotherapy  COMPARISON:  Comparison primary made with the most recent study, the chest abdomen, and pelvic CT from 6/7/2022  Older CTs were also removed  TECHNIQUE: CT examination of the chest, abdomen and pelvis was performed  Scanning through the abdomen was performed in arterial, venous and delayed phases according a protocol spefically designed to evaluate upper abdominal viscera  Axial, sagittal,   and coronal 2D reformatted images were created from the source data and submitted for interpretation  Radiation dose length product (DLP) for this visit:  2267 mGy-cm     This examination, like all CT scans performed in the [facility], was performed utilizing techniques to minimize radiation dose exposure, including the use of iterative reconstruction and automated exposure control  IV Contrast:  59 mL of iohexol (OMNIPAQUE)  Enteric Contrast: Enteric contrast was not administered  FINDINGS:    CHEST    LUNGS: There are multiple pulmonary nodules, which will be described on series 3:  Image 51, right upper lobe, solid, smooth bordered, 2 mm, this nodule has continued to decrease in size, measuring 4 mm 6/7/2022 CT, and 6 mm on the 2/19/2022 CT  This is probably a metastasis given its trend  The following nodules are small and have demonstrated no change over several studies, and are probably benign:    Image 71, right upper lobe, solid, smooth bordered, 3 mm, unchanged  Image 75, lingular left upper lobe, solid, smooth bordered, 3 mm, unchanged  Image 81, right lower lobe, solid, smooth bordered, 4 mm, unchanged  Image 105, right lower lobe, solid, smooth bordered, 2 mm, unchanged  Image 107, right lower lobe, solid, smooth bordered, 3 mm, unchanged  There are no new pulmonary nodules  PLEURA:  Unremarkable  HEART/GREAT VESSELS: Heart is unremarkable for patient's age  No thoracic aortic aneurysm  There is a Port-A-Cath  MEDIASTINUM AND NITA:  Unremarkable  CHEST WALL AND LOWER NECK:  Unremarkable  ABDOMEN    LIVER/BILIARY TREE:  There is continued improvement in multiple liver metastases  For example, a right lobe segment 7 lesion currently measures 3 5 cm, previously 5 0 cm (series 5 image 56 )    Another right lobe, segment 8 lesion currently measures 2 1 cm, previously 3 7 cm (series 5 image 56 )    Another right lobe segment 6 lesion currently measures 2 4 cm, previously 3 2 cm (series 5 image 80 )    GALLBLADDER:  There are gallstone(s) within the gallbladder, without pericholecystic inflammatory changes  SPLEEN:  Unremarkable  PANCREAS:  Unremarkable  ADRENAL GLANDS:  Unremarkable  KIDNEYS/URETERS:  Unremarkable  No hydronephrosis  Small simple cyst in the right kidney posteriorly, unchanged  No suspicious renal lesions  No hydronephrosis  STOMACH AND BOWEL:  Again seen is transverse colon loop colostomy  The distal transverse colon primary tumor is no longer clearly visible, and would have been seen on series 5 image 65-70 based on prior CT's  APPENDIX:  No findings to suggest appendicitis  ABDOMINOPELVIC CAVITY:  No ascites  No pneumoperitoneum  No lymphadenopathy  VESSELS:  Unremarkable for patient's age  PELVIS    REPRODUCTIVE ORGANS:  Unremarkable for patient's age  URINARY BLADDER:  Unremarkable  ABDOMINAL WALL/INGUINAL REGIONS:  Unremarkable  OSSEOUS STRUCTURES:  No acute fracture or destructive osseous lesion  Again seen is a lipoma in the left thigh adductor musculature  Impression: The distal transverse colon primary tumor is no longer clearly visible  There is continued improvement in multiple liver metastases  Index lesion measurements as above  Again seen are multiple pulmonary nodules, with an originally 6 mm right upper lobe nodule currently only 2 mm (series 3 image 51) having demonstrated gradual decrease in size over several studies therefore consistent with treated metastasis  All of the   other small nodules have been stable and are likely benign      Workstation performed: [address] [place] [address] Maintenance:  Health Maintenance   Topic Date Due   • COVID-19 Vaccine (4 - Booster for Moderna series) 01/25/2022   • Influenza Vaccine (1) 09/01/2022   • Depression Screening  03/07/2023   • Annual Physical  03/07/2023 (Originally 7/28/1983)   • Pneumococcal Vaccine: Pediatrics (0 to 5 Years) and At-Risk Patients (6 to 59 Years) (1 - PCV) 03/07/2023 (Originally [birth year]1970)   • DTaP,Tdap,and Td Vaccines (1 - Tdap) 03/07/2023 (Originally 7/28/1985)   • HEMOGLOBIN A1C  03/26/2023   • BMI: Followup Plan  05/24/2023   • Diabetic Foot Exam  05/24/2023   • DM Eye Exam  05/24/2023   • BMI: Adult  10/25/2023   • HIV Screening  Completed   • Hepatitis C Screening  Completed   • HIB Vaccine  Aged Out   • Hepatitis B Vaccine  Aged Out   • IPV Vaccine  Aged Out   • Hepatitis A Vaccine  Aged Out   • Meningococcal ACWY Vaccine  Aged Out   • HPV Vaccine  Aged Out     Immunization History   Administered Date(s) Administered   • COVID-19 MODERNA VACC 0 5 ML IM 03/06/2021, 04/03/2021, 10/25/2021   • Influenza, seasonal, injectable 10/12/2021         Isrrael Ramírez MD  10/25/2022,1:20 PM

## 2022-10-25 NOTE — PRE-PROCEDURE INSTRUCTIONS
Pre-Surgery Instructions:   Medication Instructions   • amLODIPine (NORVASC) 5 mg tablet Take day of surgery  • aspirin 81 MG tablet Stop taking 7 days prior to surgery  • atorvastatin (LIPITOR) 40 mg tablet Take night before surgery   • Cholecalciferol (VITAMIN D3 PO) Stop taking 7 days prior to surgery  • DULoxetine (Cymbalta) 30 mg delayed release capsule Take day of surgery  • glyBURIDE-metFORMIN (GLUCOVANCE) 5-500 MG per tablet Hold day of surgery  • Januvia 100 MG tablet Hold day of surgery  • Lantus SoloStar 100 units/mL SOPN Hold day of surgery  • lisinopril (ZESTRIL) 40 mg tablet Hold day of surgery  • metoclopramide (REGLAN) 10 mg tablet Take day of surgery  • Multiple Vitamins-Minerals (multivitamin with minerals) tablet Stop taking 7 days prior to surgery  • ondansetron (ZOFRAN) 4 mg tablet Uses PRN- OK to take day of surgery   • pantoprazole (PROTONIX) 40 mg tablet Take day of surgery  Med list reviewed as above  Also instructed pt not to start any new vitamins/supplements preoperatively and to avoid NSAID's  7 days prior to surgery  Tylenol is acceptable if needed  Pt has and/or is getting CHG surgical soap and verbalizes understanding of preoperative showering protocol  Pt has all bowel prep supplies and instructions  All questions answered  All information within "My Surgical Experience" pamphlet reviewed and patient verbalizes understanding and compliance  All questions answered

## 2022-10-26 DIAGNOSIS — M54.12 CERVICAL RADICULOPATHY: Primary | ICD-10-CM

## 2022-10-26 RX ORDER — METHOCARBAMOL 500 MG/1
500 TABLET, FILM COATED ORAL 2 TIMES DAILY
Qty: 20 TABLET | Refills: 0 | Status: SHIPPED | OUTPATIENT
Start: 2022-10-26

## 2022-11-03 ENCOUNTER — HOSPITAL ENCOUNTER (OUTPATIENT)
Dept: INFUSION CENTER | Facility: CLINIC | Age: 57
Discharge: HOME | End: 2022-11-03

## 2022-11-03 DIAGNOSIS — C18.9 COLON CANCER METASTASIZED TO LIVER (HCC): Primary | ICD-10-CM

## 2022-11-03 DIAGNOSIS — C78.7 COLON CANCER METASTASIZED TO LIVER (HCC): Primary | ICD-10-CM

## 2022-11-03 NOTE — PROGRESS NOTES
Pt presents for port a cath flush  Pt offers no complaints  Port accessed, flushed, saline locked and de-accessed without difficulty  AVS declined, Next appointment reviewed

## 2022-11-04 ENCOUNTER — PATIENT OUTREACH (OUTPATIENT)
Dept: HEMATOLOGY ONCOLOGY | Facility: CLINIC | Age: 57
End: 2022-11-04

## 2022-11-04 NOTE — PROGRESS NOTES
Indy Alexander MD sent to [facility]; Janki Pabon RN  That's fine             Previous Messages       ----- Message -----   From: Belle Smith   Sent: 11/4/2022   1:23 PM EDT   To: Indy Alexander MD, Janki Pabon, RN     Patient d/c'd the [facility] about 4 days ago (prescribed by his PCP for pinched nerve in neck/arm) It is now bothering him quite a bit and he wants to make sure it is ok to resume today prior to his upcoming surgery 11/7  Joseph clemens

## 2022-11-07 ENCOUNTER — ANESTHESIA (OUTPATIENT)
Dept: PERIOP | Facility: HOSPITAL | Age: 57
End: 2022-11-07

## 2022-11-07 ENCOUNTER — HOSPITAL ENCOUNTER (INPATIENT)
Facility: HOSPITAL | Age: 57
LOS: 26 days | Discharge: NURSING, REHAB OR LONG-TERM CARE | End: 2022-12-03
Attending: SURGERY | Admitting: SURGERY

## 2022-11-07 DIAGNOSIS — C18.9 COLON CANCER METASTASIZED TO LIVER (HCC): ICD-10-CM

## 2022-11-07 DIAGNOSIS — C79.9 METASTASIS FROM MALIGNANT NEOPLASM OF LIVER (HCC): ICD-10-CM

## 2022-11-07 DIAGNOSIS — Z79.4 TYPE 2 DIABETES MELLITUS WITHOUT COMPLICATION, WITH LONG-TERM CURRENT USE OF INSULIN (HCC): Primary | ICD-10-CM

## 2022-11-07 DIAGNOSIS — A49.9 ESBL (EXTENDED SPECTRUM BETA-LACTAMASE) PRODUCING BACTERIA INFECTION: ICD-10-CM

## 2022-11-07 DIAGNOSIS — G93.40 ACUTE ENCEPHALOPATHY: ICD-10-CM

## 2022-11-07 DIAGNOSIS — C78.7 COLON CANCER METASTASIZED TO LIVER (HCC): ICD-10-CM

## 2022-11-07 DIAGNOSIS — C22.8 METASTASIS FROM MALIGNANT NEOPLASM OF LIVER (HCC): ICD-10-CM

## 2022-11-07 DIAGNOSIS — E11.9 TYPE 2 DIABETES MELLITUS WITHOUT COMPLICATION, WITH LONG-TERM CURRENT USE OF INSULIN (HCC): Primary | ICD-10-CM

## 2022-11-07 DIAGNOSIS — Z16.12 ESBL (EXTENDED SPECTRUM BETA-LACTAMASE) PRODUCING BACTERIA INFECTION: ICD-10-CM

## 2022-11-07 DIAGNOSIS — R07.9 CHEST PAIN AT REST: ICD-10-CM

## 2022-11-07 DIAGNOSIS — C18.4 MALIGNANT NEOPLASM OF TRANSVERSE COLON (HCC): ICD-10-CM

## 2022-11-07 LAB
ABO GROUP BLD: NORMAL
BASE EXCESS BLDA CALC-SCNC: 1 MMOL/L (ref -2–3)
BLD GP AB SCN SERPL QL: NEGATIVE
CA-I BLD-SCNC: 1.18 MMOL/L (ref 1.12–1.32)
GLUCOSE SERPL-MCNC: 169 MG/DL (ref 65–140)
GLUCOSE SERPL-MCNC: 182 MG/DL (ref 65–140)
GLUCOSE SERPL-MCNC: 195 MG/DL (ref 65–140)
GLUCOSE SERPL-MCNC: 209 MG/DL (ref 65–140)
GLUCOSE SERPL-MCNC: 241 MG/DL (ref 65–140)
GLUCOSE SERPL-MCNC: 247 MG/DL (ref 65–140)
HCO3 BLDA-SCNC: 25 MMOL/L (ref 22–28)
HCT VFR BLD CALC: 34 % (ref 36.5–49.3)
HGB BLDA-MCNC: 11.6 G/DL (ref 12–17)
PCO2 BLD: 26 MMOL/L (ref 21–32)
PCO2 BLD: 37.6 MM HG (ref 36–44)
PH BLD: 7.43 [PH] (ref 7.35–7.45)
PO2 BLD: 139 MM HG (ref 75–129)
POTASSIUM BLD-SCNC: 3.6 MMOL/L (ref 3.5–5.3)
RH BLD: POSITIVE
SAO2 % BLD FROM PO2: 99 % (ref 60–85)
SODIUM BLD-SCNC: 137 MMOL/L (ref 136–145)
SPECIMEN EXPIRATION DATE: NORMAL
SPECIMEN SOURCE: ABNORMAL

## 2022-11-07 PROCEDURE — 0DBL0ZZ EXCISION OF TRANSVERSE COLON, OPEN APPROACH: ICD-10-PCS | Performed by: SURGERY

## 2022-11-07 PROCEDURE — 0DBW0ZX EXCISION OF PERITONEUM, OPEN APPROACH, DIAGNOSTIC: ICD-10-PCS | Performed by: SURGERY

## 2022-11-07 PROCEDURE — 0FB20ZX EXCISION OF LEFT LOBE LIVER, OPEN APPROACH, DIAGNOSTIC: ICD-10-PCS | Performed by: SURGERY

## 2022-11-07 RX ORDER — ALBUMIN, HUMAN INJ 5% 5 %
12.5 SOLUTION INTRAVENOUS ONCE
Status: COMPLETED | OUTPATIENT
Start: 2022-11-07 | End: 2022-11-07

## 2022-11-07 RX ORDER — ONDANSETRON 2 MG/ML
4 INJECTION INTRAMUSCULAR; INTRAVENOUS EVERY 4 HOURS PRN
Status: DISCONTINUED | OUTPATIENT
Start: 2022-11-07 | End: 2022-12-03 | Stop reason: HOSPADM

## 2022-11-07 RX ORDER — METRONIDAZOLE 500 MG/100ML
500 INJECTION, SOLUTION INTRAVENOUS ONCE
Status: COMPLETED | OUTPATIENT
Start: 2022-11-07 | End: 2022-11-07

## 2022-11-07 RX ORDER — FENTANYL CITRATE 50 UG/ML
INJECTION, SOLUTION INTRAMUSCULAR; INTRAVENOUS AS NEEDED
Status: DISCONTINUED | OUTPATIENT
Start: 2022-11-07 | End: 2022-11-07

## 2022-11-07 RX ORDER — ESMOLOL HYDROCHLORIDE 10 MG/ML
INJECTION INTRAVENOUS AS NEEDED
Status: DISCONTINUED | OUTPATIENT
Start: 2022-11-07 | End: 2022-11-07

## 2022-11-07 RX ORDER — METOCLOPRAMIDE 10 MG/1
10 TABLET ORAL 2 TIMES DAILY
Status: DISCONTINUED | OUTPATIENT
Start: 2022-11-07 | End: 2022-11-09

## 2022-11-07 RX ORDER — HYDROMORPHONE HCL/PF 1 MG/ML
SYRINGE (ML) INJECTION AS NEEDED
Status: DISCONTINUED | OUTPATIENT
Start: 2022-11-07 | End: 2022-11-07

## 2022-11-07 RX ORDER — PROPOFOL 10 MG/ML
INJECTION, EMULSION INTRAVENOUS AS NEEDED
Status: DISCONTINUED | OUTPATIENT
Start: 2022-11-07 | End: 2022-11-07

## 2022-11-07 RX ORDER — ONDANSETRON 2 MG/ML
4 INJECTION INTRAMUSCULAR; INTRAVENOUS ONCE AS NEEDED
Status: DISCONTINUED | OUTPATIENT
Start: 2022-11-07 | End: 2022-11-07 | Stop reason: HOSPADM

## 2022-11-07 RX ORDER — MIDAZOLAM HYDROCHLORIDE 2 MG/2ML
INJECTION, SOLUTION INTRAMUSCULAR; INTRAVENOUS AS NEEDED
Status: DISCONTINUED | OUTPATIENT
Start: 2022-11-07 | End: 2022-11-07

## 2022-11-07 RX ORDER — ROCURONIUM BROMIDE 10 MG/ML
INJECTION, SOLUTION INTRAVENOUS AS NEEDED
Status: DISCONTINUED | OUTPATIENT
Start: 2022-11-07 | End: 2022-11-07

## 2022-11-07 RX ORDER — ROPIVACAINE HYDROCHLORIDE 2 MG/ML
INJECTION, SOLUTION EPIDURAL; INFILTRATION; PERINEURAL CONTINUOUS PRN
Status: DISCONTINUED | OUTPATIENT
Start: 2022-11-07 | End: 2022-11-07

## 2022-11-07 RX ORDER — MAGNESIUM HYDROXIDE 1200 MG/15ML
LIQUID ORAL AS NEEDED
Status: DISCONTINUED | OUTPATIENT
Start: 2022-11-07 | End: 2022-11-07 | Stop reason: HOSPADM

## 2022-11-07 RX ORDER — CEFAZOLIN SODIUM 2 G/50ML
2000 SOLUTION INTRAVENOUS ONCE
Status: DISCONTINUED | OUTPATIENT
Start: 2022-11-07 | End: 2022-11-09

## 2022-11-07 RX ORDER — HEPARIN SODIUM 5000 [USP'U]/ML
5000 INJECTION, SOLUTION INTRAVENOUS; SUBCUTANEOUS EVERY 8 HOURS SCHEDULED
Status: DISCONTINUED | OUTPATIENT
Start: 2022-11-07 | End: 2022-11-08

## 2022-11-07 RX ORDER — FENTANYL CITRATE/PF 50 MCG/ML
25 SYRINGE (ML) INJECTION
Status: DISCONTINUED | OUTPATIENT
Start: 2022-11-07 | End: 2022-11-07 | Stop reason: HOSPADM

## 2022-11-07 RX ORDER — ONDANSETRON 2 MG/ML
INJECTION INTRAMUSCULAR; INTRAVENOUS AS NEEDED
Status: DISCONTINUED | OUTPATIENT
Start: 2022-11-07 | End: 2022-11-07

## 2022-11-07 RX ORDER — SODIUM CHLORIDE, SODIUM LACTATE, POTASSIUM CHLORIDE, CALCIUM CHLORIDE 600; 310; 30; 20 MG/100ML; MG/100ML; MG/100ML; MG/100ML
INJECTION, SOLUTION INTRAVENOUS CONTINUOUS PRN
Status: DISCONTINUED | OUTPATIENT
Start: 2022-11-07 | End: 2022-11-07

## 2022-11-07 RX ORDER — HYDRALAZINE HYDROCHLORIDE 20 MG/ML
INJECTION INTRAMUSCULAR; INTRAVENOUS AS NEEDED
Status: DISCONTINUED | OUTPATIENT
Start: 2022-11-07 | End: 2022-11-07

## 2022-11-07 RX ORDER — ATORVASTATIN CALCIUM 40 MG/1
40 TABLET, FILM COATED ORAL
Status: DISCONTINUED | OUTPATIENT
Start: 2022-11-07 | End: 2022-11-09

## 2022-11-07 RX ORDER — INSULIN LISPRO 100 [IU]/ML
1-6 INJECTION, SOLUTION INTRAVENOUS; SUBCUTANEOUS EVERY 6 HOURS SCHEDULED
Status: DISCONTINUED | OUTPATIENT
Start: 2022-11-07 | End: 2022-11-08

## 2022-11-07 RX ORDER — HYDROMORPHONE HCL/PF 1 MG/ML
0.5 SYRINGE (ML) INJECTION
Status: COMPLETED | OUTPATIENT
Start: 2022-11-07 | End: 2022-11-07

## 2022-11-07 RX ORDER — LABETALOL HYDROCHLORIDE 5 MG/ML
10 INJECTION, SOLUTION INTRAVENOUS
Status: DISCONTINUED | OUTPATIENT
Start: 2022-11-07 | End: 2022-11-07 | Stop reason: HOSPADM

## 2022-11-07 RX ORDER — ACETAMINOPHEN 325 MG/1
975 TABLET ORAL EVERY 6 HOURS SCHEDULED
Status: DISCONTINUED | OUTPATIENT
Start: 2022-11-07 | End: 2022-11-08

## 2022-11-07 RX ORDER — CEFAZOLIN SODIUM 2 G/50ML
SOLUTION INTRAVENOUS AS NEEDED
Status: DISCONTINUED | OUTPATIENT
Start: 2022-11-07 | End: 2022-11-07

## 2022-11-07 RX ORDER — ROPIVACAINE HYDROCHLORIDE 2 MG/ML
INJECTION, SOLUTION EPIDURAL; INFILTRATION; PERINEURAL AS NEEDED
Status: DISCONTINUED | OUTPATIENT
Start: 2022-11-07 | End: 2022-11-07

## 2022-11-07 RX ORDER — HYDRALAZINE HYDROCHLORIDE 20 MG/ML
10 INJECTION INTRAMUSCULAR; INTRAVENOUS ONCE AS NEEDED
Status: DISCONTINUED | OUTPATIENT
Start: 2022-11-07 | End: 2022-11-07

## 2022-11-07 RX ORDER — INSULIN GLARGINE 100 [IU]/ML
10 INJECTION, SOLUTION SUBCUTANEOUS
Status: DISCONTINUED | OUTPATIENT
Start: 2022-11-07 | End: 2022-11-08

## 2022-11-07 RX ORDER — SODIUM CHLORIDE 9 MG/ML
INJECTION, SOLUTION INTRAVENOUS CONTINUOUS PRN
Status: DISCONTINUED | OUTPATIENT
Start: 2022-11-07 | End: 2022-11-07

## 2022-11-07 RX ORDER — PANTOPRAZOLE SODIUM 40 MG/1
40 TABLET, DELAYED RELEASE ORAL
Status: DISCONTINUED | OUTPATIENT
Start: 2022-11-08 | End: 2022-11-09

## 2022-11-07 RX ORDER — METHOCARBAMOL 500 MG/1
500 TABLET, FILM COATED ORAL 2 TIMES DAILY
Status: DISCONTINUED | OUTPATIENT
Start: 2022-11-07 | End: 2022-11-08

## 2022-11-07 RX ORDER — LIDOCAINE HYDROCHLORIDE 20 MG/ML
INJECTION, SOLUTION EPIDURAL; INFILTRATION; INTRACAUDAL; PERINEURAL AS NEEDED
Status: DISCONTINUED | OUTPATIENT
Start: 2022-11-07 | End: 2022-11-07

## 2022-11-07 RX ORDER — HYDROMORPHONE HCL/PF 1 MG/ML
0.5 SYRINGE (ML) INJECTION EVERY 2 HOUR PRN
Status: DISCONTINUED | OUTPATIENT
Start: 2022-11-07 | End: 2022-11-09

## 2022-11-07 RX ORDER — SODIUM CHLORIDE, SODIUM LACTATE, POTASSIUM CHLORIDE, CALCIUM CHLORIDE 600; 310; 30; 20 MG/100ML; MG/100ML; MG/100ML; MG/100ML
100 INJECTION, SOLUTION INTRAVENOUS CONTINUOUS
Status: DISCONTINUED | OUTPATIENT
Start: 2022-11-07 | End: 2022-11-09

## 2022-11-07 RX ADMIN — ACETAMINOPHEN 975 MG: 325 TABLET ORAL at 23:41

## 2022-11-07 RX ADMIN — METHOCARBAMOL 500 MG: 500 TABLET ORAL at 15:20

## 2022-11-07 RX ADMIN — FENTANYL CITRATE 50 MCG: 50 INJECTION INTRAMUSCULAR; INTRAVENOUS at 11:10

## 2022-11-07 RX ADMIN — HYDRALAZINE HYDROCHLORIDE 2.5 MG: 20 INJECTION, SOLUTION INTRAMUSCULAR; INTRAVENOUS at 10:00

## 2022-11-07 RX ADMIN — SODIUM CHLORIDE: 0.9 INJECTION, SOLUTION INTRAVENOUS at 07:49

## 2022-11-07 RX ADMIN — PROPOFOL 50 MG: 10 INJECTION, EMULSION INTRAVENOUS at 09:45

## 2022-11-07 RX ADMIN — ROPIVACAINE HYDROCHLORIDE 5 ML/HR: 2 INJECTION, SOLUTION EPIDURAL; INFILTRATION; PERINEURAL at 10:00

## 2022-11-07 RX ADMIN — HYDROMORPHONE HYDROCHLORIDE 0.5 MG: 1 INJECTION, SOLUTION INTRAMUSCULAR; INTRAVENOUS; SUBCUTANEOUS at 09:46

## 2022-11-07 RX ADMIN — HYDROMORPHONE HYDROCHLORIDE 0.5 MG: 1 INJECTION, SOLUTION INTRAMUSCULAR; INTRAVENOUS; SUBCUTANEOUS at 12:24

## 2022-11-07 RX ADMIN — LIDOCAINE HYDROCHLORIDE 100 MG: 20 INJECTION, SOLUTION EPIDURAL; INFILTRATION; INTRACAUDAL at 07:33

## 2022-11-07 RX ADMIN — ONDANSETRON 4 MG: 2 INJECTION INTRAMUSCULAR; INTRAVENOUS at 10:41

## 2022-11-07 RX ADMIN — ROCURONIUM BROMIDE 50 MG: 50 INJECTION INTRAVENOUS at 07:33

## 2022-11-07 RX ADMIN — FENTANYL CITRATE 100 MCG: 50 INJECTION INTRAMUSCULAR; INTRAVENOUS at 09:48

## 2022-11-07 RX ADMIN — ACETAMINOPHEN 975 MG: 325 TABLET ORAL at 17:17

## 2022-11-07 RX ADMIN — ESMOLOL HYDROCHLORIDE 5 MG: 10 INJECTION, SOLUTION INTRAVENOUS at 11:11

## 2022-11-07 RX ADMIN — ATORVASTATIN CALCIUM 40 MG: 40 TABLET, FILM COATED ORAL at 15:20

## 2022-11-07 RX ADMIN — ALBUMIN HUMAN 12.5 G: 0.05 INJECTION, SOLUTION INTRAVENOUS at 12:21

## 2022-11-07 RX ADMIN — PHENYLEPHRINE HYDROCHLORIDE 10 MCG/MIN: 10 INJECTION INTRAVENOUS at 10:22

## 2022-11-07 RX ADMIN — ROPIVACAINE HYDROCHLORIDE 5 ML: 2 INJECTION, SOLUTION EPIDURAL; INFILTRATION; PERINEURAL at 11:21

## 2022-11-07 RX ADMIN — ROPIVACAINE HYDROCHLORIDE 2 ML: 2 INJECTION, SOLUTION EPIDURAL; INFILTRATION; PERINEURAL at 09:54

## 2022-11-07 RX ADMIN — INSULIN LISPRO 3 UNITS: 100 INJECTION, SOLUTION INTRAVENOUS; SUBCUTANEOUS at 17:17

## 2022-11-07 RX ADMIN — CEFAZOLIN SODIUM 2000 MG: 2 SOLUTION INTRAVENOUS at 07:51

## 2022-11-07 RX ADMIN — Medication 10 MG: at 10:00

## 2022-11-07 RX ADMIN — ROPIVACAINE HYDROCHLORIDE 5 ML: 2 INJECTION, SOLUTION EPIDURAL; INFILTRATION; PERINEURAL at 09:02

## 2022-11-07 RX ADMIN — SODIUM CHLORIDE, SODIUM LACTATE, POTASSIUM CHLORIDE, AND CALCIUM CHLORIDE: .6; .31; .03; .02 INJECTION, SOLUTION INTRAVENOUS at 06:59

## 2022-11-07 RX ADMIN — FENTANYL CITRATE 50 MCG: 50 INJECTION INTRAMUSCULAR; INTRAVENOUS at 08:00

## 2022-11-07 RX ADMIN — PROPOFOL 50 MG: 10 INJECTION, EMULSION INTRAVENOUS at 09:20

## 2022-11-07 RX ADMIN — SODIUM CHLORIDE, SODIUM LACTATE, POTASSIUM CHLORIDE, AND CALCIUM CHLORIDE 100 ML/HR: .6; .31; .03; .02 INJECTION, SOLUTION INTRAVENOUS at 12:17

## 2022-11-07 RX ADMIN — Medication 10 MG: at 08:13

## 2022-11-07 RX ADMIN — ROPIVACAINE HYDROCHLORIDE: 5 INJECTION EPIDURAL; INFILTRATION; PERINEURAL at 11:35

## 2022-11-07 RX ADMIN — Medication 25 MCG: at 11:58

## 2022-11-07 RX ADMIN — Medication 25 MCG: at 11:50

## 2022-11-07 RX ADMIN — PROPOFOL 50 MG: 10 INJECTION, EMULSION INTRAVENOUS at 10:38

## 2022-11-07 RX ADMIN — PROPOFOL 200 MG: 10 INJECTION, EMULSION INTRAVENOUS at 07:33

## 2022-11-07 RX ADMIN — ACETAMINOPHEN 975 MG: 325 TABLET ORAL at 15:19

## 2022-11-07 RX ADMIN — HEPARIN SODIUM 5000 UNITS: 5000 INJECTION INTRAVENOUS; SUBCUTANEOUS at 21:40

## 2022-11-07 RX ADMIN — SODIUM CHLORIDE: 0.9 INJECTION, SOLUTION INTRAVENOUS at 07:00

## 2022-11-07 RX ADMIN — HYDROMORPHONE HYDROCHLORIDE 0.5 MG: 1 INJECTION, SOLUTION INTRAMUSCULAR; INTRAVENOUS; SUBCUTANEOUS at 09:10

## 2022-11-07 RX ADMIN — SODIUM CHLORIDE, SODIUM LACTATE, POTASSIUM CHLORIDE, AND CALCIUM CHLORIDE 100 ML/HR: .6; .31; .03; .02 INJECTION, SOLUTION INTRAVENOUS at 18:40

## 2022-11-07 RX ADMIN — HEPARIN SODIUM 5000 UNITS: 5000 INJECTION INTRAVENOUS; SUBCUTANEOUS at 15:20

## 2022-11-07 RX ADMIN — Medication 10 MG: at 09:01

## 2022-11-07 RX ADMIN — ROCURONIUM BROMIDE 20 MG: 50 INJECTION INTRAVENOUS at 08:54

## 2022-11-07 RX ADMIN — ESMOLOL HYDROCHLORIDE 5 MG: 10 INJECTION, SOLUTION INTRAVENOUS at 10:50

## 2022-11-07 RX ADMIN — SODIUM CHLORIDE, SODIUM LACTATE, POTASSIUM CHLORIDE, AND CALCIUM CHLORIDE: .6; .31; .03; .02 INJECTION, SOLUTION INTRAVENOUS at 07:00

## 2022-11-07 RX ADMIN — FENTANYL CITRATE 50 MCG: 50 INJECTION INTRAMUSCULAR; INTRAVENOUS at 07:33

## 2022-11-07 RX ADMIN — INSULIN LISPRO 1 UNITS: 100 INJECTION, SOLUTION INTRAVENOUS; SUBCUTANEOUS at 23:41

## 2022-11-07 RX ADMIN — METRONIDAZOLE: 500 INJECTION, SOLUTION INTRAVENOUS at 07:53

## 2022-11-07 RX ADMIN — HYDRALAZINE HYDROCHLORIDE 5 MG: 20 INJECTION, SOLUTION INTRAMUSCULAR; INTRAVENOUS at 11:34

## 2022-11-07 RX ADMIN — SUGAMMADEX 200 MG: 100 INJECTION, SOLUTION INTRAVENOUS at 10:47

## 2022-11-07 RX ADMIN — HYDROMORPHONE HYDROCHLORIDE 0.5 MG: 1 INJECTION, SOLUTION INTRAMUSCULAR; INTRAVENOUS; SUBCUTANEOUS at 12:11

## 2022-11-07 RX ADMIN — Medication 25 MCG: at 12:09

## 2022-11-07 RX ADMIN — MIDAZOLAM 2 MG: 1 INJECTION INTRAMUSCULAR; INTRAVENOUS at 07:14

## 2022-11-07 RX ADMIN — ROCURONIUM BROMIDE 20 MG: 50 INJECTION INTRAVENOUS at 09:49

## 2022-11-07 RX ADMIN — INSULIN GLARGINE 10 UNITS: 100 INJECTION, SOLUTION SUBCUTANEOUS at 21:40

## 2022-11-07 RX ADMIN — METOCLOPRAMIDE 10 MG: 10 TABLET ORAL at 15:20

## 2022-11-07 NOTE — ANESTHESIA PROCEDURE NOTES
Epidural Block    Start time: 11/7/2022 7:16 AM  Reason for block: procedure for pain and at surgeon's request  Staffing  Performed: Anesthesiologist   Anesthesiologist: Jesenia Pool MD  Preanesthetic Checklist  Completed: patient identified, IV checked, site marked, risks and benefits discussed, surgical consent, monitors and equipment checked, pre-op evaluation and timeout performed  Epidural  Patient position: sitting  Prep: ChloraPrep  Patient monitoring: cardiac monitor and frequent blood pressure checks  Approach: midline  Location: thoracic  Injection technique: JUSTA air and JUSTA saline  Needle  Needle type: Tuohy   Needle gauge: 18 G  Catheter type: side hole  Catheter size: 20 G  Catheter at skin depth: 14 cm  Catheter securement method: stabilization device  Test dose: negative  Assessment  Number of attempts: 1negative aspiration for CSF, negative aspiration for heme and no paresthesia on injection  patient tolerated the procedure well with no immediate complications  Additional Notes  Uncomplicated placement  One re-direction  Midline approach with good JUSTA and easily thread catheter

## 2022-11-07 NOTE — PROGRESS NOTES
Progress Note - Surgical Oncology  Sagrario Norman 61 y o  male MRN: [id number]  Unit/Bed#: [facility] 922-01 Encounter: [id number]    Assessment:  60 yo male with PMHx of DM, GERD, HLD, HTN, CKD3, and metastatic colon cancer, POD0 s/p ex lap, transverse colon resection, reversal of loop colostomy, peritoneal bx, segment 3 liver resection by Dr Rina Olsen  Plan:  -NPO/Sips of clears  -[email or web address]  -PCEA in place  -Negro in place for accurate I/Os  -SQH for DVT prophylaxis  -Additional multimodal pain control and anti-emetic regimen   -PPI  -Wean NC O2  -Encourage OOB and ambulation starting 11/8  -Encourage ISS use  -Endocrinology consulted for blood sugar assistance, appreciate recommendations    Subjective/Objective   Chief Complaint: Abdominal discomfort 6/10 located along midline incision  Subjective:   Pain: 6/10 on PCEA, additional multimodal regimen  N/V: Denies any N/V  Tolerating Diet: Sips of clears only  -Flatus and -BM  OOB and ambulating starting 11/8    Objective:     Blood pressure 129/86, pulse (!) 107, temperature 97 7 °F (36 5 °C), resp  rate 19, height 5' 8" (1 727 m), weight 112 kg (247 lb), SpO2 96 %  ,Body mass index is 37 56 kg/m²  Intake/Output Summary (Last 24 hours) at 11/7/2022 1622  Last data filed at 11/7/2022 1307  Gross per 24 hour   Intake 2200 ml   Output 270 ml   Net 1930 ml       Invasive Devices  Report    Central Venous Catheter Line  Duration           Port A Cath 03/10/22 Right Chest 242 days          Peripheral Intravenous Line  Duration           Peripheral IV 11/07/22 Left Hand <1 day    Peripheral IV 11/07/22 Right Hand <1 day          Epidural Line  Duration           Epidural Catheter 11/07/22 <1 day          Drain  Duration           Colostomy Loop  days    Urethral Catheter Latex 16 Fr  <1 day                Physical Exam:    General: Pt is AAOx3, lying down in bed in NAD     HEENT: normocephalic, moist mucous membranes   Neck: Supple, non-tender, ROM intact   CV: Regular rate   Resp: Lung sounds clear, normal respiratory effort  On 4L NC  Continue to wean   Abd: Soft, with mild tenderness to palpation near incision site, non-distended, non-tympanitic  Hypoactive bowelsounds all 4 quadrants  No rebound or guarding  No CVA tenderness  Abdominal incision dressing clean, dry, intact, with no saturation  Surgery to remove dressing in AM and redress  Ext: Warm with no cyanosis, no edema, no deformities  ROM intact   Skin: No rashes, bruises, ulcers  Neuro: Sensation intact all 4 extremities  5+ strength all 4 extremities  Lab, Imaging and other studies:I have personally reviewed pertinent lab results    , CBC: No results found for: WBC, HGB, HCT, MCV, PLT, ADJUSTEDWBC, MCH, MCHC, RDW, MPV, NRBC, CMP: No results found for: SODIUM, K, CL, CO2, ANIONGAP, BUN, CREATININE, GLUCOSE, CALCIUM, AST, ALT, ALKPHOS, PROT, BILITOT, EGFR, Coagulation: No results found for: PT, INR, APTT, Urinalysis: No results found for: COLORU, CLARITYU, SPECGRAV, PHUR, LEUKOCYTESUR, NITRITE, PROTEINUA, GLUCOSEU, KETONESU, BILIRUBINUR, BLOODU, Amylase: No results found for: AMYLASE, Lipase: No results found for: LIPASE  VTE Pharmacologic Prophylaxis: Heparin  VTE Mechanical Prophylaxis: sequential compression device     Bushra Diogenes

## 2022-11-07 NOTE — ANESTHESIA POSTPROCEDURE EVALUATION
Post-Op Assessment Note    CV Status:  Stable  Pain Score: 0    Pain management: adequate     Mental Status:  Awake and arousable   Hydration Status:  Euvolemic and stable   PONV Controlled:  Controlled   Airway Patency:  Patent   Two or more mitigation strategies used for obstructive sleep apnea   Post Op Vitals Reviewed: Yes      Staff: CRNA   Comments: epidural infusing  dressing intact        No complications documented      BP (!) 179/88 (11/07/22 1124)    Temp 98 7 °F (37 1 °C) (11/07/22 1124)    Pulse 98 (11/07/22 1124)   Resp 15 (11/07/22 1124)    SpO2 98 % (11/07/22 1124)

## 2022-11-07 NOTE — OP NOTE
OPERATIVE REPORT  PATIENT NAME: Catracho Ledesma    :  1965  MRN: [id number]  Pt Location: [facility] OR ROOM 07    SURGERY DATE: 2022    Surgeon(s) and Role:     * Reynaldo Gao MD - Primary     * Avelino Peabody, MD - Aris Hatch MD - Co-surgeon    Preop Diagnosis:  Malignant neoplasm of transverse colon ([address] ) [C18 4]    Post-Op Diagnosis Codes:     * Malignant neoplasm of transverse colon ([address] ) [C18 4]    Procedure(s) (LRB):  EX LAP, TRANVERSE COLON RESECTION (N/A)  SEGMENT 3 LIVER RESECTION, ABLATION, INTRAOPERATIVE U/S OF LIVER, PERITONEAL BIOPSY (N/A)  REVERSAL COLOSTOMY (N/A)   Takedown of the splenic flexure    Specimen(s):  ID Type Source Tests Collected by Time Destination   1 : peritoneal nodule Tissue Peritoneum TISSUE EXAM Reynaldo Gao MD 2022  8:28 AM    2 : segment 3 liver mass Tissue Liver TISSUE EXAM Reynaldo Gao MD 2022  9:20 AM    3 : transverse colon resection Tissue Large Intestine, Transverse Colon TISSUE Vanessa Vaughn MD 2022 10:10 AM        Estimated Blood Loss:   100 mL    Drains:  Colostomy Loop RUQ (Active)   Number of days: 260       Urethral Catheter Latex 16 Fr  (Active)   Site Assessment Clean;Skin intact 22 1124   Collection Container Standard drainage bag 22 1124   Securement Method Securing device (Describe) 22 1124   Number of days: 0       [REMOVED] NG/OG/Enteral Tube Orogastric 18 Fr Center mouth (Removed)   Number of days: 0       Anesthesia Type:   General w/ Epidural    Operative Indications:  Malignant neoplasm of transverse colon ([address] ) [C254]  53-year-old male with metastatic colon cancer  It was recommended that he undergo resection  Risks and benefits were explained  Informed consent was obtained  Patient was brought to the operating room      Operative Findings:  Frozen section of a peritoneal nodule was benign  Intraoperative ultrasound suggest that he will need extended right hepatectomy to clear his liver disease  Complications:   None    Procedure and Technique:  After identifying the patient, general anesthesia was achieved  A Negro catheter was placed  Lines were placed by Anesthesia  The colostomy was closed with a running 3-0 silk suture  Patient was then prepped and draped in the usual sterile fashion  A time-out was performed  Upper midline incision was made  Using sharp dissection this was taken through the skin, subcutaneous tissue and through the fashion into the peritoneal cavity  There was no evidence of any obvious carcinomatosis  The falciform ligament was clamped, divided and ligated with 2-0 silk suture  This was taken down to the IVC  Once we did this, there was a small peritoneal nodule in the lesser sac that was excised  This was sent for frozen section and ultimately, frozen section returned as negative for malignancy  We now performed intraoperative ultrasound of the liver  There was a single lesion that was seen with several small satellites in segment 3  This was the only lesion that was seen in the left lateral segment  The larger bulky tumor extended into segment 4, but on further intraoperative ultrasound it was felt that he would need a portal vein embolization and then extended right hepatectomy  Given his age and no evidence of peritoneal disease, we proceeded with resection of the segment 3 lesion and small satellites  1 cm margins were mapped out circumferentially around all of the satellites  Using microwave energy, we pre coagulated our resection plane  Once this was performed, using a combination of the Bovie electrocautery and then a vascular stapler for vascular pedicle the entire area was excised  We bisected the gross specimen to ensure that our gross margins were negative  The wound was now copiously irrigated  There was excellent hemostasis achieved the Bovie electrocautery  FloSeal was placed in the resection bed and the area was packed    We now elected to reverse his colostomy and resect his primary tumor  Circular incision was made around the colostomy  Using sharp dissection this was taken through the skin, subcutaneous tissue and through the fascia  The colostomy was now completely freed up from the fascia and was brought into the peritoneal cavity  We elected to staple off the colostomy proximally and distally with a [address] stapling device  Once this was performed, we handed this off the table  We now mobilized further into the lesser sac using sharp dissection  The splenic flexure was taken down using sharp dissection  At this point, we had enough mobility of the colon proximally to go to our extent of resection  At this time, we found an area suitable for division distally on the transverse colon, this was essentially at the splenic flexure at and this was divided with a [address] stapling device  The mesentery was divided with the EnSeal device  We went the level of the middle colic and this was clamped, divided and ligated with 2-0 silk suture  The specimen was now oriented and handed off the table  At this time, we lined up our colon ends  The right colon was brought to the left side and secured to the left colon with 3-0 silk suture  An enterotomy was made in the right colon and the distal portion of the staple line was removed from the left colon  A 45 blue load stapler was placed in each limb and fired  The defect was now closed with a running 3-0 Vicryl suture starting at either end of the incision and secured centrally  This was then oversewn with 3-0 silk suture in a Lembert fashion  The anastomosis was widely patent  The mesenteric defect was left open as this was very wide  Wound was now copiously irrigated  There was excellent hemostasis  The liver resection bed had excellent hemostasis and there was no bile leak  The Bookwalter retractor was removed  Sponge and needle counts were correct    The fascia to the ostomy site was closed with interrupted 1  Vicryl suture in a figure-of-eight fashion  The midline fascia was approximated with 1  Nonlooped PDS suture starting at either end of the incision and secured centrally  Subcutaneous tissue was irrigated  Skin on both incision was approximated with staples  Sterile dressings were applied  Patient was then extubated and taken to the recovery room in stable condition having tolerated the procedure well  I was present and participated in all aspects of this procedure           I was present for the entire procedure    Patient Disposition:  PACU               Colon Resection  Operation performed with curative intent Yes    Tumor Location Transverse colon   Extent of colon and vascular resection Transverse colectomy-middle colic         SIGNATURE: Taylor Cerna MD  DATE: November 7, 2022  TIME: 12:09 PM

## 2022-11-07 NOTE — ANESTHESIA PREPROCEDURE EVALUATION
Procedure:  TRANVERSE COLON RESECTION (N/A Abdomen)  LIVER RESECTION/ABLATION, INTRAOPERATIVE U/S OF LIVER (N/A Abdomen)  REVERSAL COLOSTOMY (N/A Abdomen)    Relevant Problems   CARDIO   (+) Benign essential hypertension   (+) Mixed hyperlipidemia      ENDO   (+) Type 2 diabetes mellitus with hyperlipidemia (HCC)   (+) Type 2 diabetes mellitus without complication, with long-term current use of insulin (HCC)      GI/HEPATIC   (+) Colon cancer metastasized to liver (HCC)   (+) Malignant neoplasm of transverse colon (HCC)      HEMATOLOGY   (+) Iron deficiency anemia, unspecified   (+) Microcytic anemia        Physical Exam    Airway    Mallampati score: II  TM Distance: >3 FB  Neck ROM: full     Dental       Cardiovascular  Rate: normal,     Pulmonary  Pulmonary exam normal     Other Findings        Anesthesia Plan  ASA Score- 3     Anesthesia Type- general with ASA Monitors  Additional Monitors: arterial line  Airway Plan: ETT  Comment: + epidural, + 2x large IVs        Plan Factors-    Chart reviewed  EKG reviewed  Existing labs reviewed  Patient summary reviewed  Obstructive sleep apnea risk education given perioperatively  Induction- intravenous  Postoperative Plan- Plan for postoperative opioid use  Planned trial extubation    Informed Consent- Anesthetic plan and risks discussed with patient  I personally reviewed this patient with the CRNA  Discussed and agreed on the Anesthesia Plan with the CRNA  Jonathan Narayanan

## 2022-11-07 NOTE — H&P
Jin Sommer  1965  [id number]  [address] [facility] [address] [address] [phone]     Diagnoses and all orders for this visit:     Malignant neoplasm of transverse colon ([facility] [address] )  -     Case request operating room: TRANVERSE COLON RESECTION, REVERSAL OSTOMY, LIVER RESECTION/ABLATION, IOUS LIVER; Standing  -     Case request operating room: TRANVERSE COLON RESECTION, REVERSAL OSTOMY, LIVER RESECTION/ABLATION, IOUS LIVER     Colon cancer metastasized to liver ([facility] [place] [facility] )  -     Type and screen; Future  -     Prepare Leukoreduced RBC: 2 Units; Future  -     Comprehensive metabolic panel; Future  -     CBC and differential; Future  -     APTT; Future  -     Protime-INR; Future  -     HEMOGLOBIN A1C W/ EAG ESTIMATION; Future  -     EKG 12 lead;  Future     Other orders  -     Incentive spirometry; Standing  -     Insert and maintain IV line; Standing  -     Void On-Call to O R ; Standing  -     Place sequential compression device; Standing  -     metroNIDAZOLE (FLAGYL) IVPB (premix) 500 mg 100 mL  -     ceFAZolin (ANCEF) IVPB (premix in dextrose) 2,000 mg 50 mL           Chief Complaint   Patient presents with   • Follow-up         No follow-ups on file             Oncology History   Malignant neoplasm of transverse colon ([facility] [address] )   3/1/2022 Initial Diagnosis     Malignant neoplasm of transverse colon (HCC)      3/23/2022 -  Chemotherapy     fluorouracil (ADRUCIL), 400 mg/m2 = 905 mg, Intravenous, Once, 13 of 18 cycles  Administration: 905 mg (3/23/2022), 875 mg (4/6/2022), 875 mg (4/20/2022), 870 mg (5/4/2022), 880 mg (5/18/2022), 875 mg (6/1/2022), 875 mg (6/14/2022), 875 mg (6/29/2022), 875 mg (7/13/2022), 875 mg (7/27/2022), 875 mg (8/10/2022), 875 mg (8/24/2022), 875 mg (9/7/2022)  leucovorin calcium IVPB, 904 mg, Intravenous, Once, 13 of 18 cycles  Administration: 900 mg (3/23/2022), 876 mg (4/6/2022), 876 mg (4/20/2022), 868 mg (5/4/2022), 880 mg (5/18/2022), 876 mg (6/1/2022), 876 mg (6/14/2022), 876 mg (6/29/2022), 876 mg (7/13/2022), 876 mg (7/27/2022), 876 mg (8/10/2022), 876 mg (8/24/2022), 876 mg (9/7/2022)  oxaliplatin (ELOXATIN) chemo infusion, 85 mg/m2 = 192 1 mg, Intravenous, Once, 12 of 12 cycles  Administration: 192 1 mg (3/23/2022), 186 15 mg (4/6/2022), 186 15 mg (4/20/2022), 184 45 mg (5/4/2022), 187 mg (5/18/2022), 186 15 mg (6/1/2022), 186 15 mg (6/14/2022), 186 15 mg (6/29/2022), 186 15 mg (7/13/2022), 186 15 mg (7/27/2022), 186 15 mg (8/10/2022), 186 15 mg (8/24/2022)  fluorouracil (ADRUCIL) ambulatory infusion Soln, 1,200 mg/m2/day = 5,425 mg, Intravenous, Over 46 hours, 13 of 18 cycles  bevacizumab-awwb (MVASI) IVPB, 5 mg/kg = 545 mg (100 % of original dose 5 mg/kg), Intravenous, Once, 10 of 15 cycles  Dose modification: 5 mg/kg (original dose 5 mg/kg, Cycle 1)  Administration: 545 mg (3/23/2022), 500 mg (4/6/2022), 500 mg (4/20/2022), 500 mg (5/4/2022), 540 mg (5/18/2022), 535 mg (6/1/2022), 545 mg (6/14/2022), 545 mg (6/29/2022), 545 mg (7/13/2022)            Staging:  Metastatic colon cancer  Treatment history:  Loop colostomy with liver biopsy, February 2022  FOLFOX with bevacizumab  Current treatment:  Surgery pending  Disease status:  Stable     History of Present Illness:  Patient returns in follow-up     the MRI from September 13, 2022 revealed multiple hepatic metastasis  There were smaller lesions in the left lobe with the largest lesions on the right lobe  I personally reviewed his films  Liver volumes were then calculated based on his previous CT from August 2023  Left Lobe and Caudate Lobe: 498 cm3  Right Lobe: 1516 cm3  Total Liver Volume: 2014 cm3  Patient is currently well    Follow-up colonoscopy revealed a mass in mid transverse colon involving the entire circumference      Review of Systems  Complete ROS Surg Onc:   Complete ROS Surg Onc:   Constitutional: The patient denies new or recent history of general fatigue, no recent weight loss, no change in appetite  Eyes: No complaints of visual problems, no scleral icterus  ENT: no complaints of ear pain, no hoarseness, no difficulty swallowing,  no tinnitus and no new masses in head, oral cavity, or neck  Cardiovascular: No complaints of chest pain, no palpitations, no ankle edema  Respiratory: No complaints of shortness of breath, no cough  Gastrointestinal: No complaints of jaundice, no bloody stools, no pale stools  Genitourinary: No complaints of dysuria, no hematuria, no nocturia, no frequent urination, no urethral discharge  Musculoskeletal: No complaints of weakness, paralysis, joint stiffness or arthralgias  Integumentary: No complaints of rash, no new lesions  Neurological: No complaints of convulsions, no seizures, no dizziness  Hematologic/Lymphatic: No complaints of easy bruising  Endocrine:  No hot or cold intolerance  No polydipsia, polyphagia, or polyuria  Allergy/immunology:  No environmental allergies  No food allergies  Not immunocompromised  Skin:  No pallor or rash  No wound                Patient Active Problem List   Diagnosis   • Type 2 diabetes mellitus without complication, with long-term current use of insulin (HCC)   • Benign essential hypertension   • Mixed hyperlipidemia   • Erectile dysfunction   • Low testosterone   • Obesity (BMI 30-39  9)   • Testicular hypogonadism   • Incarcerated umbilical hernia   • Left ureteral calculus   • Type 2 diabetes mellitus with hyperlipidemia (HCC)   • Colonic mass   • Microcytic anemia   • Hypokalemia   • Transaminitis   • Thrombocytosis   • Iron deficiency anemia, unspecified   • Malignant neoplasm of transverse colon (HCC)   • 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 liver [facility])   • Colon cancer metastasized to liver [facility])   • Colostomy prolapse (HCC)   • Other fatigue      Medical History        Past Medical History:   Diagnosis Date   • Abdominal pain 3/12/2022   • Acute renal failure [facility])       [id number] resolved   • Diabetes mellitus ([place] [address] )     • Enteritis 08/23/2016   • Gastroparesis due to DM ([place] [address] ) 08/23/2016   • GERD (gastroesophageal reflux disease)     • Hernia, ventral 08/04/2016   • Hyperlipidemia     • Hypertension     • Morbid obesity ([place] [address] ) 4/17/2018   • Postoperative visit 3/2/2022   • SIRS (systemic inflammatory response syndrome) ([place] [address] ) 3/12/2022   • Stage 3a chronic kidney disease ([place] [address] ) 2/19/2022         Surgical History         Past Surgical History:   Procedure Laterality Date   • COLOSTOMY N/A 2/20/2022     Procedure: COLOSTOMY LOOP, diverting;  Surgeon: Linette Fish MD;  Location: [facility];  Service: General   • ESOPHAGOGASTRODUODENOSCOPY N/A 8/24/2016     Procedure: ESOPHAGOGASTRODUODENOSCOPY (EGD); Surgeon: Peace Gutierrez MD;  Location: [facility];   Service:    • IR PORT PLACEMENT   3/10/2022   • KIDNEY STONE SURGERY       • LIVER BIOPSY LAPAROSCOPIC N/A 2/20/2022     Procedure: DIAGNOSTIC LAPAROSCOPIC LIVER BIOPSY, DIVERTING LOOP COLOSTOMY ;  Surgeon: Linette Fish MD;  Location: [facility];  Service: General   • TONSILLECTOMY       • UMBILICAL HERNIA REPAIR       • UMBILICAL HERNIA REPAIR LAPAROSCOPIC N/A 4/26/2019     Procedure: LAPAROSCOPIC UMBILICAL HERNIA REPAIR;  Surgeon: Linette Fish MD;  Location: [facility];  Service: General         Family History         Family History   Problem Relation Age of Onset   • Diabetes Mother           mellitus   • Lung cancer Mother     • Coronary artery disease Father           Social History               Socioeconomic History   • Marital status: /Civil Union       Spouse name: Not on file   • Number of children: Not on file   • Years of education: Not on file   • Highest education level: Not on file   Occupational History   • Occupation: ACTOR       Employer: [employer]   Tobacco Use   • Smoking status: Never Smoker   • Smokeless tobacco: Never Used   Vaping Use   • Vaping Use: Never used Substance and Sexual Activity   • Alcohol use: Not Currently       Comment: occasion   • Drug use: No   • Sexual activity: Yes       Partners: Female   Other Topics Concern   • Not on file   Social History Narrative   • Not on file      Social Determinants of Health          Financial Resource Strain: Not on file   Food Insecurity: No Food Insecurity   • Worried About Running Out of Food in the Last Year: Never true   • Ran Out of Food in the Last Year: Never true   Transportation Needs: No Transportation Needs   • Lack of Transportation (Medical): No   • Lack of Transportation (Non-Medical): No   Physical Activity: Insufficiently Active   • Days of Exercise per Week: 5 days   • Minutes of Exercise per Session: 10 min   Stress: No Stress Concern Present   • Feeling of Stress : Not at all   Social Connections: Not on file   Intimate Partner Violence: Not on file   Housing Stability: Low Risk    • Unable to Pay for Housing in the Last Year: No   • Number of Places Lived in the Last Year: 1   • Unstable Housing in the Last Year: No            Current Outpatient Medications:   •  amLODIPine (NORVASC) 5 mg tablet, TAKE 1 TABLET BY MOUTH TWICE A DAY, Disp: 180 tablet, Rfl: 0  •  aspirin 81 MG tablet, Take 81 mg by mouth daily  , Disp: , Rfl:   •  atorvastatin (LIPITOR) 40 mg tablet, TAKE 1 TABLET BY MOUTH EVERY DAY, Disp: 90 tablet, Rfl: 3  •  Cholecalciferol (VITAMIN D3 PO), Take 400 Units by mouth daily, Disp: , Rfl:   •  Continuous Blood Gluc Sensor (FreeStyle Parrish 14 Day Sensor) MISC, Use 1 each every 14 (fourteen) days, Disp: 2 each, Rfl: 6  •  [START ON 9/21/2022] fluorouracil 5,305 mg in CADD/Elastomeric Infusion Device, Infuse 5,305 mg (1,200 mg/m2/day x 2 21 m2) into a catheter in a vein via infusion device over 46 hours for 2 days  Do not start before September 21, 2022 , Disp: 1 each, Rfl: 0  •  glyBURIDE-metFORMIN (GLUCOVANCE) 5-500 MG per tablet, Take 1 tablet by mouth daily with breakfast Taking 1 tablet in the morning , Disp: , Rfl:   •  Insulin Pen Needle (B-D UF III MINI PEN NEEDLES) 31G X 5 MM MISC, Inject under the skin daily, Disp: 100 each, Rfl: 3  •  Januvia 100 MG tablet, TAKE 1 TABLET BY MOUTH EVERY DAY, Disp: 90 tablet, Rfl: 3  •  Lantus SoloStar 100 units/mL SOPN, INJECT 30 UNITS UNDER THE SKIN DAILY, Disp: 45 mL, Rfl: 1  •  lisinopril (ZESTRIL) 40 mg tablet, TAKE 1 TABLET BY MOUTH EVERY DAY, Disp: 90 tablet, Rfl: 3  •  metoclopramide (REGLAN) 10 mg tablet, TAKE 1 TABLET BY MOUTH TWICE A DAY, Disp: 180 tablet, Rfl: 0  •  Multiple Vitamins-Minerals (multivitamin with minerals) tablet, Take 1 tablet by mouth daily, Disp: , Rfl:   •  Needle, Disp, (HYPODERMIC NEEDLE) 23G X 1-1/2" MISC, by Does not apply route once a week, Disp: 10 each, Rfl: 6  •  ondansetron (ZOFRAN) 4 mg tablet, Take 4 mg by mouth every 6 (six) hours as needed, Disp: , Rfl:   •  Ostomy Supplies MISC, Use in the morning, Disp: 100 each, Rfl: 3  •  pantoprazole (PROTONIX) 40 mg tablet, TAKE 1 TABLET BY MOUTH TWICE A DAY, Disp: 180 tablet, Rfl: 3  •  polyethylene glycol (Golytely) 4000 mL solution, Take 4,000 mL by mouth once for 1 dose Take according to instructions given by the office for colonoscopy bowel prep , Disp: 4000 mL, Rfl: 0        Allergies   Allergen Reactions   • Shellfish-Derived Products - Food Allergy Anaphylaxis   • Erythromycin GI Intolerance       Pt denies          Vitals:     09/20/22 1020   BP: 120/72   Pulse: 72   Resp: 16   Temp: (!) 96 9 °F (36 1 °C)   SpO2: 97%         Physical Exam  Constitutional: General appearance: The Patient is well-developed and well-nourished who appears the stated age in no acute distress  Patient is pleasant and talkative      HEENT:  Normocephalic  Sclerae are anicteric  Mucous membranes are moist  Neck is supple without adenopathy  No JVD       Chest: The lungs are clear to auscultation      Cardiac: Heart is regular rate      Abdomen: Abdomen is soft, non-tender, non-distended and without masses  There is a functioning transverse colostomy    Extremities: There is no clubbing or cyanosis  There is no edema  Symmetric  Neuro: Grossly nonfocal  Gait is normal      Lymphatic: No evidence of cervical adenopathy bilaterally  No evidence of axillary adenopathy bilaterally  No evidence of inguinal adenopathy bilaterally      Skin: Warm, anicteric     Psych:  Patient is pleasant and talkative  Breasts:           Pathology:  [unfilled]     Labs:        Imaging  Colonoscopy     Result Date: 9/16/2022  Narrative: [facility] [address] [phone] DATE OF SERVICE: 9/16/22 PHYSICIAN(S): Attending: Claude Marine, MD Fellow: No Staff Documented INDICATION: Colon cancer metastasized to liver [facility]), [facility] POST-OP DIAGNOSIS: See the impression below  HISTORY: Prior colonoscopy: No prior colonoscopy  BOWEL PREPARATION: Golytely/Colyte/Trilyte; Miralax/Dulcolax PREPROCEDURE: Informed consent was obtained for the procedure, including sedation  Risks including but not limited to bleeding, infection, perforation, adverse drug reaction and aspiration were explained in detail  Also explained about less than 100% sensitivity with the exam and other alternatives  The patient was placed in the left lateral decubitus position  DETAILS OF PROCEDURE: Patient was taken to the procedure room where a time out was performed to confirm correct patient and correct procedure  The patient underwent monitored anesthesia care, which was administered by an anesthesia professional  The patient's blood pressure, heart rate, level of consciousness, oxygen and respirations were monitored throughout the procedure  A digital rectal exam was performed  A perianal exam was performed  The scope was introduced through the anus and advanced to the cecum  Retroflexion was performed in the rectum   The quality of bowel preparation was evaluated using the [facility] Bowel Preparation Scale with scores of: right colon = 2, transverse colon = 2, left colon = 2  The total BBPS score was 6  Bowel prep was adequate  The patient experienced no blood loss  The procedure was not difficult  The patient tolerated the procedure well  There were no apparent complications  ANESTHESIA INFORMATION: ASA: III Anesthesia Type: IV Sedation with Anesthesia MEDICATIONS: No administrations occurring from 1112 to 1143 on 09/16/22 FINDINGS: Invasive and malignant-appearing mass (not traversable) in the mid transverse colon 90 cm from the anal verge, covering the whole circumference; performed cold forceps biopsy; tattooed proximal to the finding with carbon black  Tattoo was placed 5 cm distal to the mass and 5 cm proximal to the mass  This mass was seen when the scope was inserted through the rectum advanced to transverse colon  Also when the colonoscope was inserted through the loop colostomy and advanced into transverse colon, this mass was seen  Healthy site of previous surgery (Loop colostomy) with no bleeding in the transverse colon  The scope was entered inserted into loop colostomy proximal loop advanced to cecum  The cecum ascending and proximal transverse colon were normal  Thereafter the scope was inserted into the loop colostomy distal loop advanced to midtransverse, the near obstructing mass was seen biopsied and tattoo placed 5 cm proximal to the mass  Scattered diverticulosis throughout the colon EVENTS: Procedure Events Event Event Time ENDO SCOPE OUT TIME 9/16/2022 11:37 AM SPECIMENS: ID Type Source Tests Collected by Time Destination 1 : Cold Biopsy Transverse colon Mass Tissue Colon TISSUE EXAM Neville Pena MD 9/16/2022 11:18 AM  EQUIPMENT: Colonoscope -[id number]      Impression: Near obstructing mass was seen in the proximal to midtransverse colon  This was seen on scope was inserted to the anus advanced up to mid transverse and biopsied    This mass was also seen min the scope was inserted through the loop colostomy to this mass, more biopsies were taken, tattoos were placed 5 cm proximal and distal to this mass  RECOMMENDATION: Await pathology results  check biopsies follow-up with Surgical Oncology, and further management plans accordingly  Henretta Riedel, MD      MRI abdomen w wo contrast     Result Date: 9/14/2022  Narrative: MRI - ABDOMEN - WITH AND WITHOUT CONTRAST INDICATION: 48-year-old male with history of colon cancer and liver metastases  COMPARISON: CTs of the chest, abdomen, and pelvis dated 8/23/2022 and 6/7/2022  TECHNIQUE:  The following pulse sequences were obtained:  axial T1 weighted in/out of phase images, multiplanar T2 weighted images, axial DWI/ADC, pre-contrast axial T1 with fat saturation and dynamic multiphase post-contrast fat suppressed T1 weighted images    IV Contrast:  11 mL of Gadobutrol injection (SINGLE-DOSE) FINDINGS: LOWER CHEST:   Unremarkable  LIVER: Hypoenhancing liver metastases again demonstrated with some appearing stable and others appearing decreased in size since August   Index lesions include: --Segment VII lesion measuring 3 cm (11/152)--unchanged --Segment VIII lesion measuring 2 5 cm (11/152)--unchanged --Segment VI lesion measuring 1 1 cm (11/197) --previously 2 4 cm No new masses demonstrated  No steatosis or evidence for overload  Overall liver size is normal   Contour dimpling at the lateral right hepatic lobe related to volume loss of peripheral metastases  Otherwise contour is smooth  Hepatic and portal veins are patent  BILE DUCTS: No intrahepatic or extrahepatic bile duct dilation  GALLBLADDER:  Sludge is present  No hydrops, wall thickening, or pericholecystic inflammation  PANCREAS:  Unremarkable  ADRENAL GLANDS:  Normal  SPLEEN:  Normal  KIDNEYS/PROXIMAL URETERS: Renal collecting systems are decompressed    Bilateral Bosniak II subcentimeter cysts and nonenhancing right mid kidney Bosniak 1 cyst   No suspicious renal masses on either side  No perinephric collections  BOWEL:   Stomach is unremarkable for level distention  Question small sliding hiatal hernia  No dilated or inflamed loops of small bowel  Right upper quadrant loop colostomy is demonstrated  No colonic inflammation  Known distal transverse colonic mass PERITONEUM/RETROPERITONEUM: No mass  No ascites or encapsulated collection  LYMPH NODES: No abdominal lymphadenopathy  VASCULAR STRUCTURES:  Abdominal aorta and branch vessels appear normal in configuration and caliber  No aneurysm  No central venous thromboses demonstrated  ABDOMINAL WALL:  Degree of parastomal fat herniation at the right upper quadrant colostomy appears unchanged  No evidence for significant stomal compression/obstruction  OSSEOUS STRUCTURES:  No suspicious osseous lesion       Impression: 1  Hepatic metastases are, for the most part, unchanged in size since August, though at least one lesion in segment VI shows a short interval decrease in size  2   Known colonic mass is not well demonstrated by MRI  Workstation performed: [id number]      CT chest abdomen pelvis w contrast     Addendum Date: 9/19/2022 Addendum:   Estimated Liver Volumes as calculated on series 5: Left Lobe and Caudate Lobe: 498 cm3 Right Lobe: 1516 cm3 Total Liver Volume: 2014 cm3      Result Date: 9/19/2022  Narrative: CT CHEST, ABDOMEN AND PELVIS WITH IV CONTRAST INDICATION:   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  Dr Nicolle Laurent note from 6/15/2022 was reviewed  Patient has history of metastatic colon cancer with extensive liver metastases, status post colostomy and chemotherapy  COMPARISON:  Comparison primary made with the most recent study, the chest abdomen, and pelvic CT from 6/7/2022  Older CTs were also removed  TECHNIQUE: CT examination of the chest, abdomen and pelvis was performed   Scanning through the abdomen was performed in arterial, venous and delayed phases according a protocol spefically designed to evaluate upper abdominal viscera  Axial, sagittal, and coronal 2D reformatted images were created from the source data and submitted for interpretation  Radiation dose length product (DLP) for this visit:  2267 mGy-cm   This examination, like all CT scans performed in the [facility], was performed utilizing techniques to minimize radiation dose exposure, including the use of iterative reconstruction and automated exposure control  IV Contrast:  59 mL of iohexol (OMNIPAQUE) Enteric Contrast: Enteric contrast was not administered  FINDINGS: CHEST LUNGS: There are multiple pulmonary nodules, which will be described on series 3: Image 51, right upper lobe, solid, smooth bordered, 2 mm, this nodule has continued to decrease in size, measuring 4 mm 6/7/2022 CT, and 6 mm on the 2/19/2022 CT  This is probably a metastasis given its trend  The following nodules are small and have demonstrated no change over several studies, and are probably benign: Image 71, right upper lobe, solid, smooth bordered, 3 mm, unchanged  Image 75, lingular left upper lobe, solid, smooth bordered, 3 mm, unchanged  Image 81, right lower lobe, solid, smooth bordered, 4 mm, unchanged  Image 105, right lower lobe, solid, smooth bordered, 2 mm, unchanged  Image 107, right lower lobe, solid, smooth bordered, 3 mm, unchanged  There are no new pulmonary nodules  PLEURA:  Unremarkable  HEART/GREAT VESSELS: Heart is unremarkable for patient's age  No thoracic aortic aneurysm  There is a Port-A-Cath  MEDIASTINUM AND NITA:  Unremarkable  CHEST WALL AND LOWER NECK:  Unremarkable  ABDOMEN LIVER/BILIARY TREE:  There is continued improvement in multiple liver metastases   For example, a right lobe segment 7 lesion currently measures 3 5 cm, previously 5 0 cm (series 5 image 56 ) Another right lobe, segment 8 lesion currently measures 2 1 cm, previously 3 7 cm (series 5 image 56 ) Another right lobe segment 6 lesion currently measures 2 4 cm, previously 3 2 cm (series 5 image 80 ) GALLBLADDER:  There are gallstone(s) within the gallbladder, without pericholecystic inflammatory changes  SPLEEN:  Unremarkable  PANCREAS:  Unremarkable  ADRENAL GLANDS:  Unremarkable  KIDNEYS/URETERS:  Unremarkable  No hydronephrosis  Small simple cyst in the right kidney posteriorly, unchanged  No suspicious renal lesions  No hydronephrosis  STOMACH AND BOWEL:  Again seen is transverse colon loop colostomy  The distal transverse colon primary tumor is no longer clearly visible, and would have been seen on series 5 image 65-70 based on prior CT's  APPENDIX:  No findings to suggest appendicitis  ABDOMINOPELVIC CAVITY:  No ascites  No pneumoperitoneum  No lymphadenopathy  VESSELS:  Unremarkable for patient's age  PELVIS REPRODUCTIVE ORGANS:  Unremarkable for patient's age  URINARY BLADDER:  Unremarkable  ABDOMINAL WALL/INGUINAL REGIONS:  Unremarkable  OSSEOUS STRUCTURES:  No acute fracture or destructive osseous lesion  Again seen is a lipoma in the left thigh adductor musculature       Impression: The distal transverse colon primary tumor is no longer clearly visible  There is continued improvement in multiple liver metastases  Index lesion measurements as above  Again seen are multiple pulmonary nodules, with an originally 6 mm right upper lobe nodule currently only 2 mm (series 3 image 51) having demonstrated gradual decrease in size over several studies therefore consistent with treated metastasis  All of the other small nodules have been stable and are likely benign  Workstation performed: [id number]      I reviewed the above laboratory and imaging data      Discussion/Summary:  67-year-old male with metastatic colon cancer  Based on his current imaging he appears to have resectable disease  His liver volumes are borderline with his left lobe accounting for 24% of his liver  Also of concern is lung lesion  This is decreased in size    While this is not biopsy proven, it is still concerning  This lung lesion, if it is metastatic can be treated with continuing chemotherapy or possibly even SBRT  Since all his disease appears to be in his liver, I would recommend that he undergo reversal of his colostomy at the time of resection of his primary colon tumor  At that time we will address the left-sided liver lesions with a combination of resection/ablation  He will then require portal vein embolization to hypertrophy his left lobe and then in 6 weeks we will consider right lobectomy  The risks of reversal of his colostomy with colon resection, intraoperative ultrasound of the liver and resection/ablation were explained and included bleeding, infection, recurrence, need for further surgery, wound complications, adjacent organ injury, anastomotic leak, sepsis, mi, DVT, stroke, pulmonary embolism, and death  Informed consent was obtained  We will schedule this at our earliest mutual convenience  He is agreeable to this plan    All his questions were answered

## 2022-11-07 NOTE — CONSULTS
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 who presented to the hospital for ex lap, transverse colon resection, segment 3 liver resection, ablation, reversal of colostomy on 11/07  Endocrinology consulted for the management of type 2 diabetes mellitus in the setting of liver metastases      Plan:  Type 2 diabetes mellitus  Malignant neoplasm of transverse colon 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  HbA1c 8 0 September 2022, although hemoglobin 12 9  Home regimen:  Januvia 100 mg daily, metformin glyburide 5-500 mg QD, CGM Parrish  Inpatient regimen:  None    Recommendations: Will recommend starting on Lantus 10 units at bedtime, start correctional scale algorithm 1 with meals and at bedtime  Continue to monitor blood glucose and make adjustments as needed as the diet and shows an patient tolerates it      CC: Diabetes Consult    History of Present Illness     HPI:     This is a 61y o -year-old male with past medical history of type 2 diabetes mellitus, hypertension, hyperlipidemia, GERD, iron deficiency anemia , 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 who presented to the hospital for ex lap, transverse colon resection, segment 3 liver resection, ablation, reversal of colostomy on 11/07  Endocrinology consulted for the management of type 2 diabetes mellitus in the setting of liver metastases  Patient reports that he was diagnosed with type 2 diabetes about 17 years ago in 2005  He is on oral agents and insulin at home  He denies any polyuria, polydipsia, nocturia and blurry vision  He denies neuropathy, nephropathy, retinopathy, heart attack, stroke and claudication     He denies any hypoglycemia  Uses freestyle Parrish at home to monitor blood glucose  Preop patient's blood glucose was 247, postop blood glucose was 209  Patient is currently NPO with sips of clear liquids    Inpatient consult to Endocrinology  Consult performed by: Alfredo Simpson MD  Consult ordered by: Keon Pelayo MD          Review of Systems   Constitutional: Negative for activity change, appetite change and fatigue  HENT: Negative for congestion and sore throat  Eyes: Negative for redness and visual disturbance  Respiratory: Negative for cough and shortness of breath  Cardiovascular: Negative for palpitations and leg swelling  Gastrointestinal: Positive for abdominal pain  Negative for constipation, diarrhea, nausea and vomiting  Endocrine: Negative for polydipsia, polyphagia and polyuria  Genitourinary: Negative for difficulty urinating and dysuria  Musculoskeletal: Negative for gait problem and neck pain  Skin: Negative for color change  Neurological: Negative for dizziness and syncope  Psychiatric/Behavioral: Negative for agitation and behavioral problems  All other systems reviewed and are negative        Historical Information   Past Medical History:   Diagnosis Date   • Abdominal pain 03/12/2022   • Acute renal failure ([place] [address] )     [id number] resolved   • Cancer ([place] [address] )    • Diabetes mellitus ([place] [address] )    • Enteritis 08/23/2016   • Gastroparesis due to DM ([place] [address] ) 08/23/2016   • GERD (gastroesophageal reflux disease)    • Hernia, ventral 08/04/2016   • Hyperlipidemia    • Hypertension    • Morbid obesity ([place] [address] ) 04/17/2018   • Postoperative visit 03/02/2022   • SIRS (systemic inflammatory response syndrome) ([place] [address] ) 03/12/2022   • Snoring    • Stage 3a chronic kidney disease ([place] [address] ) 02/19/2022     Past Surgical History:   Procedure Laterality Date   • COLONOSCOPY     • COLOSTOMY N/A 02/20/2022    Procedure: COLOSTOMY LOOP, diverting;  Surgeon: Hieu Orr MD;  Location: [facility]; Service: General   • ESOPHAGOGASTRODUODENOSCOPY N/A 08/24/2016    Procedure: ESOPHAGOGASTRODUODENOSCOPY (EGD); Surgeon: Belle Sams MD;  Location: [facility];   Service:    • IR PORT PLACEMENT  03/10/2022   • KIDNEY STONE SURGERY     • LIVER BIOPSY LAPAROSCOPIC N/A 02/20/2022    Procedure: DIAGNOSTIC LAPAROSCOPIC LIVER BIOPSY, DIVERTING LOOP COLOSTOMY ;  Surgeon: Alondra Resendez MD;  Location[facility];  Service: General   • TONSILLECTOMY     • UMBILICAL HERNIA REPAIR LAPAROSCOPIC N/A 04/26/2019    Procedure: LAPAROSCOPIC UMBILICAL HERNIA REPAIR;  Surgeon: Alondra Resendez MD;  Location[facility];  Service: General     Social History   Social History     Substance and Sexual Activity   Alcohol Use Never     Social History     Substance and Sexual Activity   Drug Use No     Social History     Tobacco Use   Smoking Status Never Smoker   Smokeless Tobacco Never Used     Family History:   Family History   Problem Relation Age of Onset   • Diabetes Mother         mellitus   • Lung cancer Mother    • Coronary artery disease Father        Meds/Allergies   Current Facility-Administered Medications   Medication Dose Route Frequency Provider Last Rate Last Admin   • atorvastatin (LIPITOR) tablet 40 mg  40 mg Oral Daily Abena Camarillo MD       • ceFAZolin (ANCEF) IVPB (premix in dextrose) 2,000 mg 50 mL  2,000 mg Intravenous Once Robert Wells MD       • fentaNYL (SUBLIMAZE) injection 25 mcg  25 mcg Intravenous Q5 Min PRN Abdoulaye Cates MD   25 mcg at 11/07/22 1209   • heparin (porcine) subcutaneous injection 5,000 Units  5,000 Units Subcutaneous [facility] Abena Camarillo MD       • hydrALAZINE (APRESOLINE) injection 10 mg  10 mg Intravenous Once PRN Elie Atwood CRNA       • insulin lispro (HumaLOG) 100 units/mL subcutaneous injection 1-6 Units  1-6 Units Subcutaneous Q6H Cydney Regalado MD       • labetalol (NORMODYNE) injection 10 mg  10 mg Intravenous Q20 Min PRN Elie Atwood CRNA       • lactated ringers bolus 1,000 mL  1,000 mL Intravenous Once PRN Katty Brooke MD        And   • lactated ringers bolus 1,000 mL  1,000 mL Intravenous Once PRN Katty Brooke MD       • lactated ringers infusion  100 mL/hr Intravenous Continuous Katty Brooke  mL/hr at 11/07/22 1217 100 mL/hr at 11/07/22 1217   • methocarbamol (ROBAXIN) tablet 500 mg  500 mg Oral BID Katty Brooke MD       • metoclopramide (REGLAN) tablet 10 mg  10 mg Oral BID Katty Brooke MD       • ondansetron [facility]) injection 4 mg  4 mg Intravenous Once PRN Falguni Mantilla MD       • ondansetron [facility]) injection 4 mg  4 mg Intravenous Q4H PRN Katty Brooke MD       • pantoprazole (PROTONIX) EC tablet 40 mg  40 mg Oral Early Morning Katty Brooke MD       • ropivacaine 0 1% and fentaNYL 2 mcg/mL PCEA   Epidural Continuous Falguni Mantilla MD   New Bag at 11/07/22 1135   • sodium chloride 0 9 % bolus 1,000 mL  1,000 mL Intravenous Once PRN Katty Brooke MD        And   • sodium chloride 0 9 % bolus 1,000 mL  1,000 mL Intravenous Once PRN Katty Brooke MD         Allergies   Allergen Reactions   • Shellfish-Derived Products - Food Allergy Anaphylaxis   • Erythromycin GI Intolerance       Objective   Vitals: Blood pressure 156/75, pulse (!) 114, temperature 98 7 °F (37 1 °C), resp  rate (!) 23, height 5' 8" (1 727 m), weight 112 kg (247 lb), SpO2 95 %      Intake/Output Summary (Last 24 hours) at 11/7/2022 1255  Last data filed at 11/7/2022 1112  Gross per 24 hour   Intake 1450 ml   Output 270 ml   Net 1180 ml     Invasive Devices  Report    Central Venous Catheter Line  Duration           Port A Cath 03/10/22 Right Chest 242 days          Peripheral Intravenous Line  Duration           Peripheral IV 11/07/22 Left Hand <1 day    Peripheral IV 11/07/22 Right Hand <1 day          Epidural Line  Duration           Epidural Catheter 11/07/22 <1 day          Drain  Duration           Colostomy Loop  days    Urethral Catheter Latex 16 Fr  <1 day Physical Exam  Constitutional:       Appearance: Normal appearance  Comments: Drowsy appearing, diaphoresis on face   HENT:      Head: Normocephalic and atraumatic  Cardiovascular:      Rate and Rhythm: Regular rhythm  Tachycardia present  Pulses: Normal pulses  Heart sounds: Normal heart sounds  No murmur heard  No gallop  Pulmonary:      Effort: Pulmonary effort is normal       Breath sounds: Normal breath sounds  No wheezing or rales  Abdominal:      General: Bowel sounds are normal       Palpations: Abdomen is soft  Tenderness: There is no abdominal tenderness  Musculoskeletal:         General: No swelling  Cervical back: Normal range of motion and neck supple  Right lower leg: No edema  Left lower leg: No edema  Skin:     General: Skin is warm  Neurological:      Mental Status: He is oriented to person, place, and time  Mental status is at baseline  Psychiatric:         Mood and Affect: Mood normal          Behavior: Behavior normal          The history was obtained from the review of the chart, patient  Lab Results:       Lab Results   Component Value Date    WBC 6 2 09/26/2022    HGB 12 9 (L) 09/26/2022    HCT 37 6 09/26/2022    MCV 97 09/26/2022     09/26/2022     Lab Results   Component Value Date/Time    BUN 21 09/26/2022 10:07 AM     05/02/2017 09:19 AM    K 4 1 09/26/2022 10:07 AM     09/26/2022 10:07 AM    CO2 21 09/26/2022 10:07 AM    CREATININE 1 22 09/26/2022 10:07 AM    CREATININE 1 15 03/14/2022 04:43 AM    CREATININE 1 18 05/02/2017 09:19 AM    AST 22 09/26/2022 10:07 AM    ALT 21 09/26/2022 10:07 AM    ALB 3 8 09/26/2022 10:07 AM    ALB 1 5 (L) 03/13/2022 05:03 AM    ALB 4 1 04/10/2017 08:15 AM    GLOB 1 8 09/26/2022 10:07 AM     No results for input(s): CHOL, HDL, LDL, TRIG, VLDL in the last 72 hours    No results found for: Aurorahlen Coma  POC Glucose (mg/dl)   Date Value   11/07/2022 209 (H)   11/07/2022 247 (H)   09/16/2022 150 (H)   03/14/2022 309 (H)   03/14/2022 224 (H)   03/14/2022 261 (H)   03/13/2022 290 (H)   03/13/2022 235 (H)   03/13/2022 253 (H)   03/13/2022 180 (H)       Imaging Studies: I have personally reviewed pertinent reports  Portions of the record may have been created with voice recognition software  Please Tigertext questions to the clinician covering the "[id number]" Role  Thank you

## 2022-11-07 NOTE — ANESTHESIA PROCEDURE NOTES
Arterial Line Insertion  Performed by: Denisha Josue CRNA  Authorized by: Reji Ruiz MD   Consent: Verbal consent obtained  Written consent obtained  Risks and benefits: risks, benefits and alternatives were discussed  Consent given by: patient  Patient understanding: patient states understanding of the procedure being performed  Patient consent: the patient's understanding of the procedure matches consent given  Procedure consent: procedure consent matches procedure scheduled  Relevant documents: relevant documents present and verified  Test results: test results available and properly labeled  Site marked: the operative site was marked  Radiology Images: Radiology Images displayed and confirmed  If images not available, report reviewed  Patient identity confirmed: verbally with patient, arm band and hospital-assigned identification number  Time out: Immediately prior to procedure a "time out" was called to verify the correct patient, procedure, equipment, support staff and site/side marked as required  Preparation: Patient was prepped and draped in the usual sterile fashion    Indications: multiple ABGs and hemodynamic monitoring  Orientation:  Left  Location: radial artery  Procedure Details:  Adrian's test normal: yes  Needle gauge: 20  Seldinger technique: Seldinger technique used  Number of attempts: 1    Post-procedure:  Post-procedure: dressing applied  Waveform: good waveform  Post-procedure CNS: normal  Patient tolerance: patient tolerated the procedure well with no immediate complications

## 2022-11-07 NOTE — ADDENDUM NOTE
Addendum  created 11/07/22 1739 by Bere Mesa MD    Order list changed, Order sets accessed, Pharmacy for encounter modified

## 2022-11-07 NOTE — ANESTHESIA PROCEDURE NOTES
Arterial Line Insertion  Performed by: Laine Gaxiola CRNA  Authorized by: Suzie Corey MD   Consent: Written consent obtained  Risks and benefits: risks, benefits and alternatives were discussed  Preparation: Patient was prepped and draped in the usual sterile fashion    Indications: multiple ABGs and hemodynamic monitoring  Orientation:  Right  Location: radial artery  Procedure Details:  Needle gauge: 20  Seldinger technique: Seldinger technique used  Number of attempts: 2    Post-procedure:  Post-procedure: dressing applied  Waveform: good waveform  Post-procedure CNS: normal  Patient tolerance: patient tolerated the procedure well with no immediate complications

## 2022-11-08 ENCOUNTER — APPOINTMENT (INPATIENT)
Dept: RADIOLOGY | Facility: HOSPITAL | Age: 57
End: 2022-11-08

## 2022-11-08 ENCOUNTER — APPOINTMENT (OUTPATIENT)
Dept: RADIOLOGY | Facility: HOSPITAL | Age: 57
End: 2022-11-08

## 2022-11-08 LAB
2HR DELTA HS TROPONIN: -1 NG/L
4HR DELTA HS TROPONIN: 0 NG/L
ALBUMIN SERPL BCP-MCNC: 2 G/DL (ref 3.5–5)
ALP SERPL-CCNC: 179 U/L (ref 46–116)
ALT SERPL W P-5'-P-CCNC: 263 U/L (ref 12–78)
ANION GAP SERPL CALCULATED.3IONS-SCNC: 7 MMOL/L (ref 4–13)
ANION GAP SERPL CALCULATED.3IONS-SCNC: 8 MMOL/L (ref 4–13)
ARTERIAL PATENCY WRIST A: NO
AST SERPL W P-5'-P-CCNC: 379 U/L (ref 5–45)
ATRIAL RATE: 105 BPM
BASE EX.OXY STD BLDV CALC-SCNC: 63.9 % (ref 60–80)
BASE EX.OXY STD BLDV CALC-SCNC: 81.7 % (ref 60–80)
BASE EXCESS BLDA CALC-SCNC: -4.4 MMOL/L
BASE EXCESS BLDV CALC-SCNC: -2.1 MMOL/L
BASE EXCESS BLDV CALC-SCNC: -4.3 MMOL/L
BASOPHILS # BLD AUTO: 0.06 THOUSANDS/ÂΜL (ref 0–0.1)
BASOPHILS # BLD MANUAL: 0 THOUSAND/UL (ref 0–0.1)
BASOPHILS NFR BLD AUTO: 0 % (ref 0–1)
BASOPHILS NFR MAR MANUAL: 0 % (ref 0–1)
BILIRUB SERPL-MCNC: 0.69 MG/DL (ref 0.2–1)
BUN SERPL-MCNC: 24 MG/DL (ref 5–25)
BUN SERPL-MCNC: 31 MG/DL (ref 5–25)
CA-I BLD-SCNC: 1.06 MMOL/L (ref 1.12–1.32)
CALCIUM ALBUM COR SERPL-MCNC: 9.7 MG/DL (ref 8.3–10.1)
CALCIUM SERPL-MCNC: 8.1 MG/DL (ref 8.3–10.1)
CALCIUM SERPL-MCNC: 8.2 MG/DL (ref 8.3–10.1)
CARDIAC TROPONIN I PNL SERPL HS: 6 NG/L
CARDIAC TROPONIN I PNL SERPL HS: 7 NG/L
CARDIAC TROPONIN I PNL SERPL HS: 7 NG/L
CARDIAC TROPONIN I PNL SERPL HS: 8 NG/L (ref 8–18)
CARDIAC TROPONIN I PNL SERPL HS: 9 NG/L (ref 8–18)
CHLORIDE SERPL-SCNC: 106 MMOL/L (ref 96–108)
CHLORIDE SERPL-SCNC: 108 MMOL/L (ref 96–108)
CO2 SERPL-SCNC: 21 MMOL/L (ref 21–32)
CO2 SERPL-SCNC: 22 MMOL/L (ref 21–32)
CREAT SERPL-MCNC: 1.73 MG/DL (ref 0.6–1.3)
CREAT SERPL-MCNC: 2.28 MG/DL (ref 0.6–1.3)
EOSINOPHIL # BLD AUTO: 0.01 THOUSAND/ÂΜL (ref 0–0.61)
EOSINOPHIL # BLD MANUAL: 0.11 THOUSAND/UL (ref 0–0.4)
EOSINOPHIL NFR BLD AUTO: 0 % (ref 0–6)
EOSINOPHIL NFR BLD MANUAL: 1 % (ref 0–6)
ERYTHROCYTE [DISTWIDTH] IN BLOOD BY AUTOMATED COUNT: 12.4 % (ref 11.6–15.1)
ERYTHROCYTE [DISTWIDTH] IN BLOOD BY AUTOMATED COUNT: 12.7 % (ref 11.6–15.1)
GFR SERPL CREATININE-BSD FRML MDRD: 30 ML/MIN/1.73SQ M
GFR SERPL CREATININE-BSD FRML MDRD: 42 ML/MIN/1.73SQ M
GLUCOSE SERPL-MCNC: 149 MG/DL (ref 65–140)
GLUCOSE SERPL-MCNC: 152 MG/DL (ref 65–140)
GLUCOSE SERPL-MCNC: 179 MG/DL (ref 65–140)
GLUCOSE SERPL-MCNC: 213 MG/DL (ref 65–140)
GLUCOSE SERPL-MCNC: 217 MG/DL (ref 65–140)
GLUCOSE SERPL-MCNC: 223 MG/DL (ref 65–140)
GLUCOSE SERPL-MCNC: 224 MG/DL (ref 65–140)
HCO3 BLDA-SCNC: 18.6 MMOL/L (ref 22–28)
HCO3 BLDV-SCNC: 20.2 MMOL/L (ref 24–30)
HCO3 BLDV-SCNC: 21.9 MMOL/L (ref 24–30)
HCT VFR BLD AUTO: 22.1 % (ref 36.5–49.3)
HCT VFR BLD AUTO: 26.6 % (ref 36.5–49.3)
HCT VFR BLD AUTO: 29.7 % (ref 36.5–49.3)
HGB BLD-MCNC: 10.1 G/DL (ref 12–17)
HGB BLD-MCNC: 7.5 G/DL (ref 12–17)
HGB BLD-MCNC: 8.6 G/DL (ref 12–17)
IMM GRANULOCYTES # BLD AUTO: 0.11 THOUSAND/UL (ref 0–0.2)
IMM GRANULOCYTES NFR BLD AUTO: 1 % (ref 0–2)
LACTATE SERPL-SCNC: 3.7 MMOL/L (ref 0.5–2)
LACTATE SERPL-SCNC: 4.1 MMOL/L (ref 0.5–2)
LYMPHOCYTES # BLD AUTO: 0.32 THOUSAND/UL (ref 0.6–4.47)
LYMPHOCYTES # BLD AUTO: 0.77 THOUSANDS/ÂΜL (ref 0.6–4.47)
LYMPHOCYTES # BLD AUTO: 3 % (ref 14–44)
LYMPHOCYTES NFR BLD AUTO: 4 % (ref 14–44)
MAGNESIUM SERPL-MCNC: 1.5 MG/DL (ref 1.6–2.6)
MAGNESIUM SERPL-MCNC: 2.5 MG/DL (ref 1.6–2.6)
MCH RBC QN AUTO: 31.5 PG (ref 26.8–34.3)
MCH RBC QN AUTO: 31.6 PG (ref 26.8–34.3)
MCHC RBC AUTO-ENTMCNC: 32.3 G/DL (ref 31.4–37.4)
MCHC RBC AUTO-ENTMCNC: 34 G/DL (ref 31.4–37.4)
MCV RBC AUTO: 93 FL (ref 82–98)
MCV RBC AUTO: 98 FL (ref 82–98)
METAMYELOCYTES NFR BLD MANUAL: 1 % (ref 0–1)
MONOCYTES # BLD AUTO: 0 THOUSAND/UL (ref 0–1.22)
MONOCYTES # BLD AUTO: 1.61 THOUSAND/ÂΜL (ref 0.17–1.22)
MONOCYTES NFR BLD AUTO: 7 % (ref 4–12)
MONOCYTES NFR BLD: 0 % (ref 4–12)
NASAL CANNULA: 3
NEUTROPHILS # BLD AUTO: 19.4 THOUSANDS/ÂΜL (ref 1.85–7.62)
NEUTROPHILS # BLD MANUAL: 10.15 THOUSAND/UL (ref 1.85–7.62)
NEUTS BAND NFR BLD MANUAL: 4 % (ref 0–8)
NEUTS SEG NFR BLD AUTO: 88 % (ref 43–75)
NEUTS SEG NFR BLD AUTO: 91 % (ref 43–75)
NRBC BLD AUTO-RTO: 0 /100 WBCS
O2 CT BLDA-SCNC: 9.8 ML/DL (ref 16–23)
O2 CT BLDV-SCNC: 8 ML/DL
O2 CT BLDV-SCNC: 9.1 ML/DL
OXYHGB MFR BLDA: 89.3 % (ref 94–97)
P AXIS: -54 DEGREES
PCO2 BLDA: 26.3 MM HG (ref 36–44)
PCO2 BLDV: 33.8 MM HG (ref 42–50)
PCO2 BLDV: 34.4 MM HG (ref 42–50)
PH BLDA: 7.47 [PH] (ref 7.35–7.45)
PH BLDV: 7.39 [PH] (ref 7.3–7.4)
PH BLDV: 7.43 [PH] (ref 7.3–7.4)
PHOSPHATE SERPL-MCNC: 3.7 MG/DL (ref 2.7–4.5)
PLATELET # BLD AUTO: 225 THOUSANDS/UL (ref 149–390)
PLATELET # BLD AUTO: 327 THOUSANDS/UL (ref 149–390)
PLATELET BLD QL SMEAR: ADEQUATE
PMV BLD AUTO: 10.1 FL (ref 8.9–12.7)
PMV BLD AUTO: 10.3 FL (ref 8.9–12.7)
PO2 BLDA: 62.6 MM HG (ref 75–129)
PO2 BLDV: 36.6 MM HG (ref 35–45)
PO2 BLDV: 49.3 MM HG (ref 35–45)
POIKILOCYTOSIS BLD QL SMEAR: PRESENT
POTASSIUM SERPL-SCNC: 4.5 MMOL/L (ref 3.5–5.3)
POTASSIUM SERPL-SCNC: 4.9 MMOL/L (ref 3.5–5.3)
PR INTERVAL: 130 MS
PROT SERPL-MCNC: 5.2 G/DL (ref 6.4–8.4)
QRS AXIS: -46 DEGREES
QRSD INTERVAL: 134 MS
QT INTERVAL: 362 MS
QTC INTERVAL: 478 MS
RBC # BLD AUTO: 2.72 MILLION/UL (ref 3.88–5.62)
RBC # BLD AUTO: 3.21 MILLION/UL (ref 3.88–5.62)
RBC MORPH BLD: PRESENT
SODIUM SERPL-SCNC: 135 MMOL/L (ref 135–147)
SODIUM SERPL-SCNC: 137 MMOL/L (ref 135–147)
SPECIMEN SOURCE: ABNORMAL
T WAVE AXIS: -7 DEGREES
VENTRICULAR RATE: 105 BPM
WBC # BLD AUTO: 10.68 THOUSAND/UL (ref 4.31–10.16)
WBC # BLD AUTO: 21.96 THOUSAND/UL (ref 4.31–10.16)

## 2022-11-08 PROCEDURE — 03HY32Z INSERTION OF MONITORING DEVICE INTO UPPER ARTERY, PERCUTANEOUS APPROACH: ICD-10-PCS | Performed by: SURGERY

## 2022-11-08 PROCEDURE — 30233L1 TRANSFUSION OF NONAUTOLOGOUS FRESH PLASMA INTO PERIPHERAL VEIN, PERCUTANEOUS APPROACH: ICD-10-PCS | Performed by: SURGERY

## 2022-11-08 PROCEDURE — 4A133B1 MONITORING OF ARTERIAL PRESSURE, PERIPHERAL, PERCUTANEOUS APPROACH: ICD-10-PCS | Performed by: SURGERY

## 2022-11-08 PROCEDURE — 30233N1 TRANSFUSION OF NONAUTOLOGOUS RED BLOOD CELLS INTO PERIPHERAL VEIN, PERCUTANEOUS APPROACH: ICD-10-PCS | Performed by: SURGERY

## 2022-11-08 PROCEDURE — 4A133J1 MONITORING OF ARTERIAL PULSE, PERIPHERAL, PERCUTANEOUS APPROACH: ICD-10-PCS | Performed by: SURGERY

## 2022-11-08 RX ORDER — SODIUM CHLORIDE, SODIUM GLUCONATE, SODIUM ACETATE, POTASSIUM CHLORIDE, MAGNESIUM CHLORIDE, SODIUM PHOSPHATE, DIBASIC, AND POTASSIUM PHOSPHATE .53; .5; .37; .037; .03; .012; .00082 G/100ML; G/100ML; G/100ML; G/100ML; G/100ML; G/100ML; G/100ML
1000 INJECTION, SOLUTION INTRAVENOUS ONCE
Status: COMPLETED | OUTPATIENT
Start: 2022-11-08 | End: 2022-11-09

## 2022-11-08 RX ORDER — INSULIN LISPRO 100 [IU]/ML
5 INJECTION, SOLUTION INTRAVENOUS; SUBCUTANEOUS
Status: DISCONTINUED | OUTPATIENT
Start: 2022-11-08 | End: 2022-11-09

## 2022-11-08 RX ORDER — BACLOFEN 10 MG/1
10 TABLET ORAL 3 TIMES DAILY
Status: DISCONTINUED | OUTPATIENT
Start: 2022-11-08 | End: 2022-11-09

## 2022-11-08 RX ORDER — INSULIN LISPRO 100 [IU]/ML
1-5 INJECTION, SOLUTION INTRAVENOUS; SUBCUTANEOUS
Status: DISCONTINUED | OUTPATIENT
Start: 2022-11-08 | End: 2022-11-09

## 2022-11-08 RX ORDER — METHOCARBAMOL 750 MG/1
750 TABLET, FILM COATED ORAL EVERY 8 HOURS SCHEDULED
Status: DISCONTINUED | OUTPATIENT
Start: 2022-11-08 | End: 2022-11-09

## 2022-11-08 RX ORDER — LIDOCAINE 50 MG/G
1 PATCH TOPICAL DAILY
Status: COMPLETED | OUTPATIENT
Start: 2022-11-08 | End: 2022-11-08

## 2022-11-08 RX ORDER — INSULIN GLARGINE 100 [IU]/ML
15 INJECTION, SOLUTION SUBCUTANEOUS
Status: DISCONTINUED | OUTPATIENT
Start: 2022-11-08 | End: 2022-11-09

## 2022-11-08 RX ORDER — CALCIUM GLUCONATE 20 MG/ML
2 INJECTION, SOLUTION INTRAVENOUS ONCE
Status: COMPLETED | OUTPATIENT
Start: 2022-11-08 | End: 2022-11-09

## 2022-11-08 RX ORDER — MAGNESIUM SULFATE HEPTAHYDRATE 40 MG/ML
4 INJECTION, SOLUTION INTRAVENOUS ONCE
Status: COMPLETED | OUTPATIENT
Start: 2022-11-08 | End: 2022-11-09

## 2022-11-08 RX ORDER — ACETAMINOPHEN 325 MG/1
975 TABLET ORAL EVERY 8 HOURS SCHEDULED
Status: DISCONTINUED | OUTPATIENT
Start: 2022-11-08 | End: 2022-11-09

## 2022-11-08 RX ADMIN — HEPARIN SODIUM 5000 UNITS: 5000 INJECTION INTRAVENOUS; SUBCUTANEOUS at 14:17

## 2022-11-08 RX ADMIN — IOHEXOL 100 ML: 350 INJECTION, SOLUTION INTRAVENOUS at 20:25

## 2022-11-08 RX ADMIN — METOPROLOL TARTRATE 25 MG: 25 TABLET, FILM COATED ORAL at 16:28

## 2022-11-08 RX ADMIN — ROPIVACAINE HYDROCHLORIDE: 5 INJECTION EPIDURAL; INFILTRATION; PERINEURAL at 04:48

## 2022-11-08 RX ADMIN — BACLOFEN 10 MG: 10 TABLET ORAL at 16:28

## 2022-11-08 RX ADMIN — INSULIN LISPRO 5 UNITS: 100 INJECTION, SOLUTION INTRAVENOUS; SUBCUTANEOUS at 18:10

## 2022-11-08 RX ADMIN — METHOCARBAMOL 500 MG: 500 TABLET ORAL at 09:30

## 2022-11-08 RX ADMIN — METOCLOPRAMIDE 10 MG: 10 TABLET ORAL at 09:30

## 2022-11-08 RX ADMIN — SODIUM CHLORIDE, SODIUM LACTATE, POTASSIUM CHLORIDE, AND CALCIUM CHLORIDE 1000 ML: .6; .31; .03; .02 INJECTION, SOLUTION INTRAVENOUS at 18:22

## 2022-11-08 RX ADMIN — HYDROMORPHONE HYDROCHLORIDE 0.5 MG: 1 INJECTION, SOLUTION INTRAMUSCULAR; INTRAVENOUS; SUBCUTANEOUS at 01:49

## 2022-11-08 RX ADMIN — MAGNESIUM SULFATE HEPTAHYDRATE 4 G: 40 INJECTION, SOLUTION INTRAVENOUS at 09:32

## 2022-11-08 RX ADMIN — LIDOCAINE 5% 1 PATCH: 700 PATCH TOPICAL at 09:31

## 2022-11-08 RX ADMIN — BACLOFEN 10 MG: 10 TABLET ORAL at 09:30

## 2022-11-08 RX ADMIN — SODIUM CHLORIDE, SODIUM LACTATE, POTASSIUM CHLORIDE, AND CALCIUM CHLORIDE 100 ML/HR: .6; .31; .03; .02 INJECTION, SOLUTION INTRAVENOUS at 04:51

## 2022-11-08 RX ADMIN — HEPARIN SODIUM 5000 UNITS: 5000 INJECTION INTRAVENOUS; SUBCUTANEOUS at 05:08

## 2022-11-08 RX ADMIN — INSULIN LISPRO 2 UNITS: 100 INJECTION, SOLUTION INTRAVENOUS; SUBCUTANEOUS at 12:48

## 2022-11-08 RX ADMIN — SODIUM CHLORIDE, SODIUM LACTATE, POTASSIUM CHLORIDE, AND CALCIUM CHLORIDE 100 ML/HR: .6; .31; .03; .02 INJECTION, SOLUTION INTRAVENOUS at 15:30

## 2022-11-08 RX ADMIN — PANTOPRAZOLE SODIUM 40 MG: 40 TABLET, DELAYED RELEASE ORAL at 05:08

## 2022-11-08 RX ADMIN — ATORVASTATIN CALCIUM 40 MG: 40 TABLET, FILM COATED ORAL at 16:28

## 2022-11-08 RX ADMIN — METHOCARBAMOL TABLETS 750 MG: 750 TABLET, COATED ORAL at 14:17

## 2022-11-08 RX ADMIN — SODIUM CHLORIDE, SODIUM LACTATE, POTASSIUM CHLORIDE, AND CALCIUM CHLORIDE 1000 ML: .6; .31; .03; .02 INJECTION, SOLUTION INTRAVENOUS at 06:39

## 2022-11-08 RX ADMIN — METOCLOPRAMIDE 10 MG: 10 TABLET ORAL at 17:59

## 2022-11-08 RX ADMIN — ROPIVACAINE HYDROCHLORIDE: 5 INJECTION EPIDURAL; INFILTRATION; PERINEURAL at 17:06

## 2022-11-08 RX ADMIN — PIPERACILLIN AND TAZOBACTAM 3.38 G: 36; 4.5 INJECTION, POWDER, FOR SOLUTION INTRAVENOUS at 23:57

## 2022-11-08 RX ADMIN — INSULIN LISPRO 1 UNITS: 100 INJECTION, SOLUTION INTRAVENOUS; SUBCUTANEOUS at 05:08

## 2022-11-08 RX ADMIN — SODIUM CHLORIDE, SODIUM GLUCONATE, SODIUM ACETATE, POTASSIUM CHLORIDE AND MAGNESIUM CHLORIDE 1000 ML: 526; 502; 368; 37; 30 INJECTION, SOLUTION INTRAVENOUS at 21:29

## 2022-11-08 RX ADMIN — ACETAMINOPHEN 975 MG: 325 TABLET ORAL at 05:07

## 2022-11-08 NOTE — UTILIZATION REVIEW
Initial Clinical Review    Elective IP surgical procedure  Age/Sex: 61 y o  male  Surgery Date: 11/7/2022  Procedure:   EX LAP, TRANVERSE COLON RESECTION (N/A)  SEGMENT 3 LIVER RESECTION, ABLATION, INTRAOPERATIVE U/S OF LIVER, PERITONEAL BIOPSY (N/A)  REVERSAL COLOSTOMY (N/A)   Takedown of the splenic flexure  Anesthesia: General w/ Epidural  Operative Findings:   Frozen section of a peritoneal nodule was benign  Intraoperative ultrasound suggest that he will need extended right hepatectomy to clear his liver disease        POD#1 Progress Note: C/o left shoulder pain radiating to his arm, has a h/o a pinched nerve in his neck, and he feels this is similar  EKG and trop ordered for cardiac w/u  Clear liquid diet  IVFs  Continue epidural  Keep rivero cath 1 more day  OOB/ambulate  Aggressive pulm toilet  Wean O2 as able  SQH/SCDs  Endo consulted for insulin regimen  Admission Orders: Date/Time/Statement:   Admission Orders (From admission, onward)     Ordered        11/07/22 1127  Inpatient Admission  Once                      Orders Placed This Encounter   Procedures   • Inpatient Admission     Standing Status:   Standing     Number of Occurrences:   1     Order Specific Question:   Level of Care     Answer:   Level 2 Stepdown / HOT [14]     Order Specific Question:   Estimated length of stay     Answer:   More than 2 Midnights     Order Specific Question:   Certification     Answer:   I certify that inpatient services are medically necessary for this patient for a duration of greater than two midnights  See H&P and MD Progress Notes for additional information about the patient's course of treatment       Vital Signs:  Date/Time Temp Pulse Resp BP MAP (mmHg) SpO2 Calculated FIO2 (%) - Nasal Cannula O2 Flow Rate (L/min) Nasal Cannula O2 Flow Rate (L/min) O2 Device   11/08/22 10:04:15 -- 109 Abnormal  -- 104/66 79 98 % -- -- -- --   11/08/22 09:59:08 -- 113 Abnormal  -- 98/63 75 98 % -- -- -- --   11/08/22 07:34:21 97 3 °F (36 3 °C) Abnormal  98 18 136/86 103 98 % -- -- -- --   11/08/22 0244 -- -- -- -- -- -- 28 -- 2 L/min Nasal cannula   11/08/22 01:54:39 97 3 °F (36 3 °C) Abnormal  104 18 139/86 104 97 % -- -- -- --   11/07/22 23:04:43 97 7 °F (36 5 °C) 104 16 137/86 103 96 % -- -- -- --   11/07/22 2100 -- -- -- -- -- -- 28 -- 2 L/min Nasal cannula   11/07/22 19:04:18 97 7 °F (36 5 °C) 98 19 137/86 103 98 % 28 -- 2 L/min Nasal cannula   11/07/22 15:14:03 97 7 °F (36 5 °C) 107 Abnormal  19 129/86 100 96 % -- -- -- --   11/07/22 13:57:53 97 5 °F (36 4 °C) 108 Abnormal  20 126/84 98 96 % -- -- -- --   11/07/22 1307 98 °F (36 7 °C) 114 Abnormal  22 149/78 98 97 % 36 -- 4 L/min Nasal cannula   11/07/22 1300 -- 112 Abnormal  23 Abnormal  149/76 91 96 % 36 -- 4 L/min Nasal cannula   11/07/22 1245 -- 114 Abnormal  23 Abnormal  156/75 99 95 % 36 -- 4 L/min Nasal cannula   11/07/22 1230 -- 114 Abnormal  23 Abnormal  164/79 116 96 % 36 -- 4 L/min Nasal cannula   11/07/22 1224 -- -- -- -- -- 95 % 36 -- 4 L/min Nasal cannula   11/07/22 1215 -- 116 Abnormal  22 166/81 111 95 % 32 -- 3 L/min Nasal cannula   11/07/22 1200 -- 114 Abnormal  20 168/81 116 96 % 32 -- 3 L/min Nasal cannula   11/07/22 1145 -- 110 Abnormal  20 175/85 Abnormal  112 95 % 28 -- 2 L/min Nasal cannula   11/07/22 1130 -- 100 20 185/99 Abnormal  136 98 % 28 -- 2 L/min Nasal cannula   11/07/22 1124 98 7 °F (37 1 °C) 98 15 179/88 Abnormal  132 98 % -- 6 L/min -- Simple mask   11/07/22 0532 97 °F (36 1 °C) Abnormal  99 17 159/100  -- 97 % -- -- -- None (Room air)       Pertinent Labs/Diagnostic Test Results:   EKG 11/7: Normal sinus rhythm  Right bundle branch block  Possible Inferior infarct , age undetermined  When compared with ECG of 04-JAN-2020 16:27,  No significant change was found    Results from last 7 days   Lab Units 11/08/22  0631 11/07/22  0835   WBC Thousand/uL 21 96*  --    HEMOGLOBIN g/dL 10 1*  --    I STAT HEMOGLOBIN g/dl  --  11 6*   HEMATOCRIT % 29 7* --    HEMATOCRIT, ISTAT %  --  34*   PLATELETS Thousands/uL 327  --    NEUTROS ABS Thousands/µL 19 40*  --      Results from last 7 days   Lab Units 11/08/22  0631 11/07/22  0835   SODIUM mmol/L 137  --    POTASSIUM mmol/L 4 9  --    CHLORIDE mmol/L 108  --    CO2 mmol/L 21  --    CO2, I-STAT mmol/L  --  26   ANION GAP mmol/L 8  --    BUN mg/dL 24  --    CREATININE mg/dL 1 73*  --    EGFR ml/min/1 73sq m 42  --    CALCIUM mg/dL 8 2*  --    CALCIUM, IONIZED, ISTAT mmol/L  --  1 18   MAGNESIUM mg/dL 1 5*  --      Results from last 7 days   Lab Units 11/08/22  0507 11/07/22  2340 11/07/22  2140 11/07/22  1714 11/07/22  1128 11/07/22  0536   POC GLUCOSE mg/dl 179* 182* 195* 241* 209* 247*     Results from last 7 days   Lab Units 11/08/22  0631   GLUCOSE RANDOM mg/dL 213*     Results from last 7 days   Lab Units 11/07/22  0835   I STAT BASE EXC mmol/L 1   I STAT O2 SAT % 99*   ISTAT PH ART  7 431   I STAT ART PCO2 mm HG 37 6   I STAT ART PO2 mm  0*   I STAT ART HCO3 mmol/L 25 0       Results from last 7 days   Lab Units 11/08/22  0914   HS TNI 0HR ng/L 7       Scheduled Medications:  acetaminophen, 975 mg, Oral, Q8H SHANNON  atorvastatin, 40 mg, Oral, Daily With Dinner  baclofen, 10 mg, Oral, TID  cefazolin, 2,000 mg, Intravenous, Once  heparin (porcine), 5,000 Units, Subcutaneous, Q8H SHANNON  insulin glargine, 10 Units, Subcutaneous, HS  insulin lispro, 1-6 Units, Subcutaneous, Q6H SHANNON  lidocaine, 1 patch, Topical, Daily  magnesium sulfate, 4 g, Intravenous, Once  methocarbamol, 500 mg, Oral, BID  metoclopramide, 10 mg, Oral, BID  pantoprazole, 40 mg, Oral, Early Morning    Continuous IV Infusions:  lactated ringers, 100 mL/hr, Intravenous, Continuous  ropivacaine 0 1% and fentaNYL 2 mcg/mL, , Epidural, Continuous    PRN Meds:  HYDROmorphone, 0 5 mg, Intravenous, Q2H PRN 11/8 x1  lactated ringers, 1,000 mL, Intravenous, Once PRN  ondansetron, 4 mg, Intravenous, Q4H PRN  sodium chloride, 1,000 mL, Intravenous, Once PRN          Network Utilization Review Department  ATTENTION: Please call with any questions or concerns to [phone] and carefully listen to the prompts so that you are directed to the right person  All voicemails are confidential   Annmarie Marks all requests for admission clinical reviews, approved or denied determinations and any other requests to dedicated fax number below belonging to the campus where the patient is receiving treatment   List of dedicated fax numbers for the Facilities:  [address] DENIALS (Administrative/Medical Necessity) [phone]   [address] (Maternity/NICU/Pediatrics) [phone]   [address] Ericka Pinto [phone]   [place] [address] [phone]   [address] [place] [address] Shailesh Rosales 28 [phone]   15[address] [facility] [place] [facility], [facility] [address] [facility] [phone]

## 2022-11-08 NOTE — PROGRESS NOTES
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 who presented to the hospital for ex lap, transverse colon resection, segment 3 liver resection, ablation, reversal of colostomy on 11/07  Endocrinology consulted for the management of type 2 diabetes mellitus in the setting of liver metastases  Patient reports to be feeling well, tolerating diet well, no hypoglycemia    Objective:     Vitals: Blood pressure 145/94, pulse (!) 118, temperature 97 7 °F (36 5 °C), resp  rate 18, height 5' 8" (1 727 m), weight 112 kg (247 lb), SpO2 90 %  ,Body mass index is 37 56 kg/m²  Intake/Output Summary (Last 24 hours) at 11/8/2022 1754  Last data filed at 11/8/2022 1706  Gross per 24 hour   Intake 3222 87 ml   Output 450 ml   Net 2772 87 ml       Physical Exam:  General Appearance: awake, appears stated age and cooperative  Head: Normocephalic, without obvious abnormality, atraumatic  Extremities: moves all extremities  Skin: Skin color and temperature normal    Pulm: no labored breathing    Lab, Imaging and other studies: I have personally reviewed pertinent reports        POC Glucose (mg/dl)   Date Value   11/08/2022 223 (H)   11/08/2022 179 (H)   11/07/2022 182 (H)   11/07/2022 195 (H)   11/07/2022 241 (H)   11/07/2022 209 (H)   11/07/2022 247 (H)   09/16/2022 150 (H)   03/14/2022 309 (H)   03/14/2022 224 (H)       Assessment and plan:    Type 2 diabetes mellitus with hyperglycemia  HbA1c 8 0 September 2022  Home regimen:  Januvia 100 mg daily, metformin glyburide 5-500 mg QD, CGM Parrish  Inpatient regimen:   Lantus 10 units at bedtime, correctional scale algorithm 1 with meals and at bedtime    Recommendations: Will recommend increasing Lantus to 15 units from 10 units at bedtime  Start Humalog 5 units with meals  Continue with correctional scale algorithm 2 with meals and 1 at bedtime  Will continue to monitor blood glucose and make adjustments as needed     Portions of the record may have been created with voice recognition software

## 2022-11-08 NOTE — PROGRESS NOTES
EKG machine not scanning patient ID  Rn entered info manually and sent image of EKG to Zoe El and placed in bin with Pt sticker to be scanned

## 2022-11-08 NOTE — QUICK NOTE
QUICK NOTE - Deterioration Index  Marta Ahumada 61 y o  male MRN: [id number]  [facility] Encounter: [id number]    Date Paged: 22  Time Paged: 1817  Room #: 943  Arrival Time: 1900  Deterioration index score at time of page: 71 98  %  Spoke with Dr Luisa Velazco  from primary team  Need to escalate level of care: pending w/u and progression      PROBLEMS resulting in high DI score:   38% Respiratory rate 38   15% Supplemental oxygen Nasal cannula   15 Age 61years old   9% Pulse 118   5% Cardiac rhythm Heart block   5% WBC count abnormal (21 96 Thousand/uL)   5% Potassium 4 9 mmol/L     Contribution Factor Value   4% Systolic 828   2% Pulse oximetry 92 %   2% Hematocrit abnormal (29 7 %)   1% Sodium 137 mmol/L   1% Blood pH 7 431   <1% Temperature 98 1 °F (36 7 °C)     •     PLAN:    • Patient tachycardic, tachypnec, altered and hypertensive   • Work up being conducted by primary team including lactic, CBC, CMP, cultures and imaging studies  • Ongoing management as SD2 for now, low threshold for upgrade in level of care pending results of w/u and clinical progression   • Recommend updating code status     Please contact critical care via [employer] with any questions or concerns       Vitals:   Vitals:    22 1213 22 1259 22 1520 22 1755   BP: 139/86 130/79 145/94 154/96   Pulse: 97 (!) 109 (!) 118 (!) 109   Resp: 18 18 18 (!) 38   Temp: 97 5 °F (36 4 °C) 97 7 °F (36 5 °C)  98 1 °F (36 7 °C)   TempSrc:       SpO2: 94% 94% 90% 95%   Weight:       Height:           Respiratory:  SpO2: SpO2: 95 %, SpO2 Activity: SpO2 Activity: At Rest, SpO2 Device: O2 Device: Nasal cannula  Nasal Cannula O2 Flow Rate (L/min): 1 L/min    Temperature: Temp (24hrs), Av 6 °F (36 4 °C), Min:97 3 °F (36 3 °C), Max:98 1 °F (36 7 °C)  Current: Temperature: 98 1 °F (36 7 °C)    Labs:   Results from last 7 days   Lab Units 22  0631 22  0835   WBC Thousand/uL 21 96*  --    HEMOGLOBIN g/dL 10 1*  --    I STAT HEMOGLOBIN g/dl  --  11 6*   HEMATOCRIT % 29 7*  --    HEMATOCRIT, ISTAT %  --  34*   PLATELETS Thousands/uL 327  --    NEUTROS PCT % 88*  --    MONOS PCT % 7  --      Results from last 7 days   Lab Units 11/08/22  0631 11/07/22  0835   SODIUM mmol/L 137  --    POTASSIUM mmol/L 4 9  --    CHLORIDE mmol/L 108  --    CO2 mmol/L 21  --    CO2, I-STAT mmol/L  --  26   BUN mg/dL 24  --    CREATININE mg/dL 1 73*  --    CALCIUM mg/dL 8 2*  --    GLUCOSE, ISTAT mg/dl  --  169*     Results from last 7 days   Lab Units 11/08/22  0631   MAGNESIUM mg/dL 1 5*                   Code Status: Prior

## 2022-11-08 NOTE — CONSULTS
Consult Note- Acute Pain Service   Cesar Anguiano 61 y o  male MRN: [id number]  Unit/Bed#: [facility] 922-01 Encounter: [id number]               Assessment/Plan     Assessment:   Patient Active Problem List   Diagnosis   • Type 2 diabetes mellitus without complication, with long-term current use of insulin (HCC)   • Benign essential hypertension   • Mixed hyperlipidemia   • Erectile dysfunction   • Low testosterone   • Obesity (BMI 30-39  9)   • Testicular hypogonadism   • Incarcerated umbilical hernia   • Left ureteral calculus   • Type 2 diabetes mellitus with hyperlipidemia (HCC)   • Colonic mass   • Microcytic anemia   • Hypokalemia   • Transaminitis   • Thrombocytosis   • Iron deficiency anemia, unspecified   • Malignant neoplasm of transverse colon (HCC)   • 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 liver [facility])   • Colon cancer metastasized to liver [facility])   • Colostomy prolapse (HCC)   • Other fatigue   • Cervical radiculopathy      Cesar Anguiano is a 61 y o  male with PMHx of obesity, T2DM, HTN, and CKD III who initially presented with metastatic CRC (mets to liver) s/p transverse colon resection/reversal of loop colostomy/segment 3 liver resection on 11/7  A pre-operative thoracic epidural was placed for post-operative analgesia  APS consulted for post-operative pain/epidural management  Upon bedside evaluation, Yoselin Raygoza reports breakthrough abdominal pain especially in his lower abdomen below incision line  It is worst with movement and deep inspiration  Extra PCEA doses don't seem to alleviate the pain  No flatus/BM yet  Making urine  No evidence of excessive sympathectomy-induced hypotension/pressor requirements/abnormal sensorimotor deficits  Plan: Ice testing revealed appropriate sensory block along incision line although there was sparing in the lower abdominal quadrants  This is expected as epidurals provide sensory block on the average of 2-4 levels   To optimize spread of epidural medication, I will increase his PCEA settings in the hopes that the lower dermatomes are covered  - Increase thoracic epidural PCEA settings with 0 1% ropivacaine/2mcg/ml fentanyl to 12/6/10/3  - Continue IV dilaudid 0 5mg q2hr PRN for breakthrough    Multimodal analgesia:  - Tylenol 975 mg PO q8hrs standing  - Lidocaine patches to affected areas 12 hours on, 12 hours off  - Increase PO robaxin to 750mg q8hr scheduled    Bowel Regimen:  - Bowel regimen when appropriate from surgical perspective    APS will continue to follow  Please contact Acute Pain Service - [facility] via [email or web address] from [phone] with additional questions or concerns  See [employer] or Amion for additional contacts and after hours information  History of Present Illness    Admit Date:  11/7/2022  Hospital Day:  1 day  Primary Service:  Oncology-Medical  Attending Provider:  Taylor Cerna MD  Reason for Consult / Principal Problem: Post-operative pain control  HPI: Tammy Burnett is a 61 y o  male who presents with 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) s/p transverse colon resection/reversal of loop colostomy/segment 3 liver resection on 11/7  Current pain location(s): Lower abdomen  Pain Scale:   5-9/10  Quality: Sharp, achy  Current Analgesic regimen:  See above    Pain History: N/A  Pain Management Provider:  N/A    I have reviewed the patient's controlled substance dispensing history in the Prescription Drug Monitoring Program in compliance with the [facility] regulations before prescribing any controlled substances  Inpatient consult to Acute Pain Service  Consult performed by: Afsaneh Winter MD  Consult ordered by: Amanuel Gomez MD          Review of Systems   Constitutional: Negative  HENT: Negative  Eyes: Negative  Respiratory: Negative  Cardiovascular: Negative  Gastrointestinal: Positive for abdominal pain  Genitourinary: Negative  Musculoskeletal: Negative  Skin: Negative  Neurological: Negative  Psychiatric/Behavioral: Negative  Historical Information   Past Medical History:   Diagnosis Date   • Abdominal pain 03/12/2022   • Acute renal failure ([place] [address] )     [id number] resolved   • Cancer ([place] [address] )    • Diabetes mellitus ([place] [address] )    • Enteritis 08/23/2016   • Gastroparesis due to DM ([place] [address] ) 08/23/2016   • GERD (gastroesophageal reflux disease)    • Hernia, ventral 08/04/2016   • Hyperlipidemia    • Hypertension    • Morbid obesity ([place] [address] ) 04/17/2018   • Postoperative visit 03/02/2022   • SIRS (systemic inflammatory response syndrome) ([place] [address] ) 03/12/2022   • Snoring    • Stage 3a chronic kidney disease ([place] [address] ) 02/19/2022     Past Surgical History:   Procedure Laterality Date   • COLONOSCOPY     • COLOSTOMY N/A 02/20/2022    Procedure: COLOSTOMY LOOP, diverting;  Surgeon: Joann Argueta MD;  Location: [facility];  Service: General   • ESOPHAGOGASTRODUODENOSCOPY N/A 08/24/2016    Procedure: ESOPHAGOGASTRODUODENOSCOPY (EGD); Surgeon: Cora Pike MD;  Location: [facility];   Service:    • ILEOSTOMY CLOSURE N/A 11/7/2022    Procedure: REVERSAL COLOSTOMY;  Surgeon: Tony Dueñas MD;  Location: [facility];  Service: Surgical Oncology   • IR PORT PLACEMENT  03/10/2022   • KIDNEY STONE SURGERY     • LIVER BIOPSY LAPAROSCOPIC N/A 02/20/2022    Procedure: DIAGNOSTIC LAPAROSCOPIC LIVER BIOPSY, DIVERTING LOOP COLOSTOMY ;  Surgeon: Joann Argueta MD;  [facility];  Service: General   • LIVER LOBECTOMY N/A 11/7/2022    Procedure: SEGMENT 3 LIVER RESECTION, ABLATION, INTRAOPERATIVE U/S OF LIVER, PERITONEAL BIOPSY;  Surgeon: Tony Dueñas MD;  Location: [facility];  Service: Surgical Oncology   • RIGHT COLON RESECTION N/A 11/7/2022    Procedure: EX LAP, TRANVERSE COLON RESECTION;  Surgeon: Tony Dueñas MD;  Location: [facility];  Service: Surgical Oncology   • TONSILLECTOMY     • UMBILICAL HERNIA REPAIR LAPAROSCOPIC N/A 04/26/2019    Procedure: LAPAROSCOPIC UMBILICAL HERNIA REPAIR;  Surgeon: Joann Argueta MD;  [facility]: [facility];  Service: General     Social History   Social History     Substance and Sexual Activity   Alcohol Use Never     Social History     Substance and Sexual Activity   Drug Use No     Social History     Tobacco Use   Smoking Status Never Smoker   Smokeless Tobacco Never Used     Family History: non-contributory    Meds/Allergies   all current active meds have been reviewed    Allergies   Allergen Reactions   • Shellfish-Derived Products - Food Allergy Anaphylaxis   • Erythromycin GI Intolerance       Objective   Temp:  [97 3 °F (36 3 °C)-98 7 °F (37 1 °C)] 97 3 °F (36 3 °C)  HR:  [] 109  Resp:  [15-23] 18  BP: ()/(63-99) 104/66    Intake/Output Summary (Last 24 hours) at 11/8/2022 1019  Last data filed at 11/8/2022 0448  Gross per 24 hour   Intake 2654 2 ml   Output 570 ml   Net 2084 2 ml       Physical Exam  Vitals reviewed  Constitutional:       Appearance: He is obese  HENT:      Head: Normocephalic  Mouth/Throat:      Mouth: Mucous membranes are dry  Eyes:      Extraocular Movements: Extraocular movements intact  Cardiovascular:      Rate and Rhythm: Normal rate  Pulses: Normal pulses  Pulmonary:      Effort: Pulmonary effort is normal    Abdominal:      General: Abdomen is flat  Tenderness: There is abdominal tenderness  Comments: Abdominal incision c/d/i   Musculoskeletal:         General: Normal range of motion  Cervical back: Normal range of motion  Skin:     General: Skin is dry  Neurological:      General: No focal deficit present  Mental Status: He is alert and oriented to person, place, and time  Psychiatric:         Mood and Affect: Mood normal          Lab Results: I have personally reviewed pertinent labs  Imaging Studies: I have personally reviewed pertinent reports  EKG, Pathology, and Other Studies: I have personally reviewed pertinent reports        Counseling / Coordination of Care  Total floor / unit time spent today Level 1 = 20 minutes  Greater than 50% of total time was spent with the patient and / or family counseling and / or coordination of care  Please note that the APS provides consultative services regarding pain management only  With the exception of ketamine and epidural infusions and except when indicated, final decisions regarding starting or changing doses of analgesic medications are at the discretion of the consulting service  Off hours consultation and/or medication management is generally not available      [address]  Acute Pain Service

## 2022-11-08 NOTE — PLAN OF CARE
Problem: PHYSICAL THERAPY ADULT  Goal: Performs mobility at highest level of function for planned discharge setting  See evaluation for individualized goals  Description: Treatment/Interventions: ADL retraining, Functional transfer training, LE strengthening/ROM, Endurance training, Patient/family training, Equipment eval/education, Bed mobility, Gait training, Spoke to nursing, Spoke to case management          See flowsheet documentation for full assessment, interventions and recommendations  Note: Prognosis: Good  Problem List: Decreased strength, Decreased range of motion, Decreased endurance, Impaired balance, Decreased mobility, Pain  Assessment: Pt seen for high complexity PT evaluation  Pt with active PT eval/treat and activity as tolerated orders  Pt is a 61 y o  male who was admitted to [facility] on 11/7/2022 with colon cancer metastasized to liver s/p ex lap transverse colon resection  Pt's current dx/ problem list include: type 2 DM, HTN, obesity, hypokalemia, cervical rediculopathy  Comorbidities affecting pt's physical performance at time of assessment are as follows:  has a past medical history of Abdominal pain (03/12/2022), Acute renal failure ([facility] ), Cancer ([facility] ), Diabetes mellitus ([facility] ), Enteritis (08/23/2016), G[facility]ropares[facility] to DM ([facility] ) (08/23/2016), GERD (gastroesophageal reflux disease), Hernia, ventral (08/04/2016), Hyperlipidemia, Hypertension, Morbid obesity ([facility] ) (04/17/2018), Postoperative visit (03/02/2022), SIRS (systemic inflammatory response syndrome) ([facility] ) (03/12/2022), Snoring, and Stage 3a chronic kidney disease ([facility] ) (02/19/2022)  Personal factors affecting pt at time of initial examination include: steps to enter environment, limited home support, advanced age, past experience, inability to perform IADLs, inability to perform ADLs, inability to ambulate household distances   Due to acute medical issues, ongoing medical workup for primary dx; pain, fall risk, increased reliance on more restrictive AD compared to baseline;  decreased activity tolerance compared to baseline, increased assistance needed from caregiver at current time, continuous telemetry monitoring, multiple lines, decline in overall functional mobility status; health management issues; note unstable clinical picture (high complexity)  At baseline, pt resides with spouse and teenage children in [address] [place] [address] with 2 ADRIAN and was independent prior to hospital admission  Currently, upon initial examination, pt  is requiring mod Ax2 for bed mobility skills; mod Ax2 for functional transfers and mod A for ambulation with RW  Currently, pt presents functioning below baseline and with overall mobility deficits 2* to: decreased LE strength/AROM; limited flexibility;  generalized weakness/ deconditioning; decreased endurance; decreased activity tolerance; decreased coordination; impaired balance; gait deviations; SOB/LOVETT; fatigue; bed/ chair alarms; multiple lines;as well as : weakness, impaired balance, decreased endurance, gait deviations, pain, decreased activity tolerance, decreased functional mobility tolerance and fall risk  Pt currently at increased risk for falls  At the end of evaluation, pt was left supine in bed with all needs in reach  Pt would benefit from continued skilled PT services while in hospital to address remaining limitations and maximize functional potential  PT to continue to follow pt and recommends home with HHPT pending progress  The patient's AM-PAC Basic Mobility Inpatient Short Form Raw Score is 10  A Raw score of less than or equal to 16 suggests the patient may benefit from discharge to post-acute rehabilitation services  Please also refer to the recommendation of the Physical Therapist for safe discharge planning  PT Discharge Recommendation: Home with home health rehabilitation (pending progress)    See flowsheet documentation for full assessment

## 2022-11-08 NOTE — PROGRESS NOTES
2310 Pt complaining of chest pain when breathing in  Per pt started after he was using his incentive spirometer  Pt slightly tachy 104, other vitals WDL  Reid Heard aware  0130 Pt complaining of pain when breathing in due to pinched nerve (ongoing issue) in neck and shoulder, PRN dilaudid given and reid Heard aware and at bedside

## 2022-11-08 NOTE — PROGRESS NOTES
Progress Note - General Surgery   Severo Herr 61 y o  male MRN: [id number]  Unit/Bed#: [facility] 922-01 Encounter: [id number]    Assessment:  62 yo male with PMHx of DM, GERD, HLD, HTN, CKD3, and metastatic colon cancer, POD1 s/p ex lap, transverse colon resection, reversal of loop colostomy, peritoneal bx, segment 3 liver resection by Dr Cohen Sham  Plan:  Advance diet to clears  Consult to APS for breakthrough pain   LR @100  NS bolus   Negro in place for accurate I/Os  [facility] for DVT prophylaxis  PCEA in place  PPI  Wean NC O2  Encourage OOB and ambulation starting today  Encourage ISS use  Endo rec Lantus 10u at qhs & start correctional scale algorithm 1 with meals qhs  Continue to monitor blood glucose and adjust  EKG and trop ordered to further eval left sided neck/scapular pain  Tele ordered d/t pain    Subjective/Objective     Subjective:   Pain 6/10 at incision site on PCEA  Denies N/V  No BM or flatus  Tolerating sips of clears  Plans to get OOB today  IS at bedside  Pain to left side of neck starting overnight at rest  Described as 9/10 and sharp in nature  Hx of pinched nerve  Pain feels similar but more extreme  Pain with inspiration and movement of neck  Denies CP, pain down left arm, or cardiac hx  Objective:     Blood pressure 139/86, pulse 104, temperature (!) 97 3 °F (36 3 °C), resp  rate 18, height 5' 8" (1 727 m), weight 112 kg (247 lb), SpO2 97 %  ,Body mass index is 37 56 kg/m²        Intake/Output Summary (Last 24 hours) at 11/8/2022 0456  Last data filed at 11/8/2022 0448  Gross per 24 hour   Intake 3104 2 ml   Output 720 ml   Net 2384 2 ml       Invasive Devices  Report    Central Venous Catheter Line  Duration           Port A Cath 03/10/22 Right Chest 242 days          Peripheral Intravenous Line  Duration           Peripheral IV 11/07/22 Left Hand <1 day    Peripheral IV 11/07/22 Right Hand <1 day          Epidural Line  Duration           Epidural Catheter 11/07/22 <1 day Drain  Duration           Colostomy Loop  days    Urethral Catheter Latex 16 Fr  <1 day                Physical Exam:               General: Pt is alert and coherent, lying down in bed in NAD  HEENT: normocephalic, atraumatic              Neck: Supple, ROM intact  L sided neck pain with rotation              CV: Regular rate              Resp: Lung sounds clear, normal respiratory effort  On 2L NC  Continue to wean              Abd: Soft, with mild tenderness to palpation near incision site, non-distended, non-tympanitic  Hypoactive bowelsounds x4  No rebound or guarding  Abdominal incision dressing clean, dry, intact, with no saturation  Dressing removed  Ext: Warm with no cyanosis, no edema, no deformities  ROM intact              Skin: No rashes, bruises, ulcers  Lab, Imaging and other studies:  I have personally reviewed pertinent lab results      CBC:   Lab Results   Component Value Date    HGB 11 6 (L) 11/07/2022    HCT 34 (L) 11/07/2022   , CMP:   Lab Results   Component Value Date    CO2 26 11/07/2022    GLUCOSE 169 (H) 11/07/2022   Coagulation: No results found for: PT, INR, APTT, Urinalysis: No results found for: COLORU, CLARITYU, SPECGRAV, PHUR, LEUKOCYTESUR, NITRITE, PROTEINUA, GLUCOSEU, KETONESU, BILIRUBINUR, BLOODU, Amylase: No results found for: AMYLASE, Lipase: No results found for: LIPASE  VTE Pharmacologic Prophylaxis: Heparin  VTE Mechanical Prophylaxis: sequential compression device

## 2022-11-08 NOTE — CONSULTS
Consultation - Cardiology  Gabriela Im 61 y o  male MRN: [id number]  Unit/Bed#: [facility] 922-01 Encounter: [id number]      Inpatient consult to Cardiology  Consult performed by: Liudmila Ghotra MD  Consult ordered by: Pierre Eden PA-C          History of Present Illness   Physician Requesting Consult: George Ordonez MD  Reason for Consult / Principal Problem: New EKG findings, left shoulder pain     Assessment/Plan   Assessment/Plan:  Mr Bruna Martinez is a 60 yo male w/ PMHC of obesity, T2DM, HTN, CKD stage 3, metastatic colon cancer POD#1 from ex lap, transverse colon resection, reverasal of colostomy, segment 3 liver resection who presents with new left shoulder pain and new EKG findings  Ectopic atrial tachycardia:   New finding on EKG when compared to ekg 10/13/22  Likely 2/2 stress from recent procedure  Asymptomatic (denies CP, palpitations, SOB, dizziness, light headedness)   Will place on telemetry  Will start metoprolol tartrate 25 mg q12hr      Left Anterior fascicular block:  New finding on EKG when compared to EKG 10/13/22  Asymptomatic (denies CP, palpitations, SOB, dizziness, light headedness)       Left should pain:   Likely not of cardiac origin given symptoms, hx, troponin's( random troponin of 8), and EKG findings  Likely 2/2 msk       HTN:  's/80's   Home medications: amlodipine 5mg bid, lisinopril 40 mg daily  Home meds currently held in setting of recent procedure  Patient Active Problem List   Diagnosis   • Type 2 diabetes mellitus without complication, with long-term current use of insulin ([facility] [address] )   • Benign essential hypertension   • Mixed hyperlipidemia   • Erectile dysfunction   • Low testosterone   • Obesity (BMI 30-39  9)   • Testicular hypogonadism   • Incarcerated umbilical hernia   • Left ureteral calculus   • Type 2 diabetes mellitus with hyperlipidemia (HCC)   • Colonic mass   • Microcytic anemia   • Hypokalemia   • Transaminitis   • Thrombocytosis   • Iron deficiency anemia, unspecified   • Malignant neoplasm of transverse colon (HCC)   • 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 liver [facility])   • Colon cancer metastasized to liver [facility])   • Colostomy prolapse (HCC)   • Other fatigue   • Cervical radiculopathy       HPI: Severo Herr is a 61y o  year old male with left shoulder pain  Patient states that a few hours after his procedure on 11/7 he began having left shoulder pain  He describes the pain as a pinching sensation and states that it radiates down his left arm  Patient states the shoulder pain is made worse by taking deep breaths but denies pain in his chest with deep breaths  He states about 2 weeks ago he was evaluated by his PCP for a "pinched nerve" in his left arm and the pain in his shoulder feels similar to that episode  He denies CP, palpitations, SOB, diaphoresis, HA, n/v, heartburn, or dizziness  He states he has not seen cardiology before and has never had an Echo done previously  Patient states he does not have any family hx of cardiac disease that he is aware of  He denies any hx of smoking  Primary Cardiologist: does not follow with cardiology outpatient  EKG: new Ectopic atrial tachycardia and left anterior fascicular block compared to EKG on 10/13/22    Cardiac testing:   ECHO:   No results found for this or any previous visit  Has never had echo   CATH:  Has never undergone a Cath    STRESS TEST:  Has never undergone a stress test       Review of Systems   Constitutional: Negative for chills and fever  HENT: Negative for rhinorrhea and sore throat  Eyes: Negative for visual disturbance  Respiratory: Negative for cough and shortness of breath  Cardiovascular: Negative for chest pain and palpitations  Gastrointestinal: Negative for nausea and vomiting  Genitourinary: Negative for difficulty urinating  Musculoskeletal: Positive for neck pain  Skin: Negative for pallor     Neurological: Positive for numbness (chronic issue in bilateral hands from neuropathy)  Negative for dizziness, light-headedness and headaches  Historical Information   Past Medical History:   Diagnosis Date   • Abdominal pain 03/12/2022   • Acute renal failure ([place] [address] )     [id number] resolved   • Cancer ([place] [address] )    • Diabetes mellitus ([place] [address] )    • Enteritis 08/23/2016   • Gastroparesis due to DM ([place] [address] ) 08/23/2016   • GERD (gastroesophageal reflux disease)    • Hernia, ventral 08/04/2016   • Hyperlipidemia    • Hypertension    • Morbid obesity ([place] [address] ) 04/17/2018   • Postoperative visit 03/02/2022   • SIRS (systemic inflammatory response syndrome) ([place] [address] ) 03/12/2022   • Snoring    • Stage 3a chronic kidney disease ([place] [address] ) 02/19/2022     Past Surgical History:   Procedure Laterality Date   • COLONOSCOPY     • COLOSTOMY N/A 02/20/2022    Procedure: COLOSTOMY LOOP, diverting;  Surgeon: Nan Oh MD;  Location: [facility];  Service: General   • ESOPHAGOGASTRODUODENOSCOPY N/A 08/24/2016    Procedure: ESOPHAGOGASTRODUODENOSCOPY (EGD); Surgeon: Celena Lott MD;  Location: [facility];   Service:    • ILEOSTOMY CLOSURE N/A 11/7/2022    Procedure: REVERSAL COLOSTOMY;  Surgeon: Matthew Kay MD;  Location: [facility];  Service: Surgical Oncology   • IR PORT PLACEMENT  03/10/2022   • KIDNEY STONE SURGERY     • LIVER BIOPSY LAPAROSCOPIC N/A 02/20/2022    Procedure: DIAGNOSTIC LAPAROSCOPIC LIVER BIOPSY, DIVERTING LOOP COLOSTOMY ;  Surgeon: Nan hO MD;  [facility];  Service: General   • LIVER LOBECTOMY N/A 11/7/2022    Procedure: SEGMENT 3 LIVER RESECTION, ABLATION, INTRAOPERATIVE U/S OF LIVER, PERITONEAL BIOPSY;  Surgeon: Matthew Kay MD;  Location: [facility];  Service: Surgical Oncology   • RIGHT COLON RESECTION N/A 11/7/2022    Procedure: EX LAP, TRANVERSE COLON RESECTION;  Surgeon: Matthew Kay MD;  [facility]: [facility];  Service: Surgical Oncology   • TONSILLECTOMY     • UMBILICAL HERNIA REPAIR LAPAROSCOPIC N/A 04/26/2019    Procedure: LAPAROSCOPIC UMBILICAL HERNIA REPAIR;  Surgeon: Yoshi Corral MD;  Location: [facility];  Service: General     Social History     Substance and Sexual Activity   Alcohol Use Never     Social History     Substance and Sexual Activity   Drug Use No     Social History     Tobacco Use   Smoking Status Never Smoker   Smokeless Tobacco Never Used     Family History: non-contributory    Meds/Allergies   Hospital Medications:   Current Facility-Administered Medications   Medication Dose Route Frequency   • acetaminophen (TYLENOL) tablet 975 mg  975 mg Oral Q8H [address]   • atorvastatin (LIPITOR) tablet 40 mg  40 mg Oral Daily With Dinner   • baclofen tablet 10 mg  10 mg Oral TID   • ceFAZolin (ANCEF) IVPB (premix in dextrose) 2,000 mg 50 mL  2,000 mg Intravenous Once   • heparin (porcine) subcutaneous injection 5,000 Units  5,000 Units Subcutaneous Q8H [address]   • HYDROmorphone (DILAUDID) injection 0 5 mg  0 5 mg Intravenous Q2H PRN   • insulin glargine (LANTUS) subcutaneous injection 10 Units 0 1 mL  10 Units Subcutaneous HS   • insulin lispro (HumaLOG) 100 units/mL subcutaneous injection 1-6 Units  1-6 Units Subcutaneous Q6H [address]   • lactated ringers bolus 1,000 mL  1,000 mL Intravenous Once PRN    And   • lactated ringers bolus 1,000 mL  1,000 mL Intravenous Once PRN   • lactated ringers infusion  100 mL/hr Intravenous Continuous   • lidocaine (LIDODERM) 5 % patch 1 patch  1 patch Topical Daily   • magnesium sulfate 4 g/100 mL IVPB (premix) 4 g  4 g Intravenous Once   • methocarbamol (ROBAXIN) tablet 500 mg  500 mg Oral BID   • metoclopramide (REGLAN) tablet 10 mg  10 mg Oral BID   • ondansetron (ZOFRAN) injection 4 mg  4 mg Intravenous Q4H PRN   • pantoprazole (PROTONIX) EC tablet 40 mg  40 mg Oral Early Morning   • ropivacaine 0 1% and fentaNYL 2 mcg/mL PCEA   Epidural Continuous   • sodium chloride 0 9 % bolus 1,000 mL  1,000 mL Intravenous Once PRN    And   • sodium chloride 0 9 % bolus 1,000 mL  1,000 mL Intravenous Once PRN     Home Medications: Medications Prior to Admission   Medication   • amLODIPine (NORVASC) 5 mg tablet   • aspirin 81 MG tablet   • atorvastatin (LIPITOR) 40 mg tablet   • Cholecalciferol (VITAMIN D3 PO)   • DULoxetine (Cymbalta) 30 mg delayed release capsule   • glyBURIDE-metFORMIN (GLUCOVANCE) 5-500 MG per tablet   • Januvia 100 MG tablet   • Lantus SoloStar 100 units/mL SOPN   • lisinopril (ZESTRIL) 40 mg tablet   • methocarbamol (ROBAXIN) 500 mg tablet   • metoclopramide (REGLAN) 10 mg tablet   • Multiple Vitamins-Minerals (multivitamin with minerals) tablet   • pantoprazole (PROTONIX) 40 mg tablet   • Continuous Blood Gluc Sensor (FreeStyle [place]rish 14 Day Sensor) MISC   • Insulin Pen Needle (B-D UF III MINI PEN NEEDLES) 31G X 5 MM MISC   • Needle, Disp, (HYPODERMIC NEEDLE) 23G X 1-1/2" MISC   • ondansetron (ZOFRAN) 4 mg tablet   • Ostomy Supplies MISC       Allergies   Allergen Reactions   • Shellfish-Derived Products - Food Allergy Anaphylaxis   • Erythromycin GI Intolerance       Objective   Vitals: Blood pressure 104/66, pulse (!) 109, temperature (!) 97 3 °F (36 3 °C), resp  rate 18, height 5' 8" (1 727 m), weight 112 kg (247 lb), SpO2 98 %    Orthostatic Blood Pressures    Flowsheet Row Most Recent Value   Blood Pressure 104/66 filed at 11/08/2022 1004            Intake/Output Summary (Last 24 hours) at 11/8/2022 1029  Last data filed at 11/8/2022 0448  Gross per 24 hour   Intake 2654 2 ml   Output 570 ml   Net 2084 2 ml       Invasive Devices  Report    Central Venous Catheter Line  Duration           Port A Cath 03/10/22 Right Chest 242 days          Peripheral Intravenous Line  Duration           Peripheral IV 11/07/22 Left Hand 1 day    Peripheral IV 11/07/22 Right Hand 1 day          Epidural Line  Duration           Epidural Catheter 11/07/22 1 day          Drain  Duration           Colostomy Loop  days    Urethral Catheter Latex 16 Fr  1 day                Physical Exam  Constitutional:       General: He is not in acute distress  HENT:      Head: Normocephalic and atraumatic  Cardiovascular:      Rate and Rhythm: Normal rate and regular rhythm  Pulses: Normal pulses  Heart sounds: No murmur heard  Pulmonary:      Effort: No respiratory distress  Breath sounds: Normal breath sounds  Abdominal:      Tenderness: There is abdominal tenderness  Comments: Abdominal incision c/d/i   Musculoskeletal:      Cervical back: Normal range of motion  No tenderness  Right lower leg: No edema  Left lower leg: No edema  Skin:     General: Skin is warm  Neurological:      General: No focal deficit present  Mental Status: He is alert and oriented to person, place, and time  Motor: No weakness  Comments: +5 strength of bilateral UE, equal bilaterally               Lab Results: I have personally reviewed pertinent lab results  Results from last 7 days   Lab Units 11/08/22 0631 11/07/22  0835   WBC Thousand/uL 21 96*  --    HEMOGLOBIN g/dL 10 1*  --    I STAT HEMOGLOBIN g/dl  --  11 6*   HEMATOCRIT % 29 7*  --    HEMATOCRIT, ISTAT %  --  34*   PLATELETS Thousands/uL 327  --      Results from last 7 days   Lab Units 11/08/22  0631 11/07/22  0835   POTASSIUM mmol/L 4 9  --    CHLORIDE mmol/L 108  --    CO2 mmol/L 21  --    CO2, I-STAT mmol/L  --  26   BUN mg/dL 24  --    CREATININE mg/dL 1 73*  --    GLUCOSE, ISTAT mg/dl  --  169*   CALCIUM mg/dL 8 2*  --          Results from last 7 days   Lab Units 11/08/22  0631   MAGNESIUM mg/dL 1 5*       Imaging: I have personally reviewed pertinent reports          Derick Hill MD  Family Medicine, PGY1

## 2022-11-08 NOTE — CASE MANAGEMENT
Case Management Assessment & Discharge Planning Note    Patient name Marta Ahumada  Location [facility] 922/[facility] 922-01 MRN [id number]  : 1965 Date 2022       Current Admission Date: 2022  Current Admission Diagnosis:Colon cancer metastasized to liver [facility])   Patient Active Problem List    Diagnosis Date Noted   • Cervical radiculopathy 10/26/2022   • Other fatigue 06/15/2022   • Colostomy prolapse ([facility] [address] ) 2022   • Colon cancer metastasized to liver ([facility] [address] ) 2022   • 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 liver ([facility] ) 2022   • Iron deficiency anemia, unspecified 2022   • Malignant neoplasm of transverse colon ([facility] ) 2022   • Thrombocytosis 2022   • Hypokalemia 2022   • Transaminitis 2022   • Type 2 diabetes mellitus with hyperlipidemia ([facility] [address] ) 2022   • Colonic mass 2022   • Microcytic anemia 2022   • Left ureteral calculus 2020   • Incarcerated umbilical hernia    • Testicular hypogonadism 2017   • Low testosterone 2017   • Type 2 diabetes mellitus without complication, with long-term current use of insulin ([facility] ) 2016   • Benign essential hypertension 2016   • Mixed hyperlipidemia 2016   • Erectile dysfunction 2016   • Obesity (BMI 30-39 9) 2016      LOS (days): 1  Geometric Mean LOS (GMLOS) (days): 3 50  Days to GMLOS:2 3     OBJECTIVE:    Risk of Unplanned Readmission Score: 21 57         Current admission status: Inpatient       Preferred Pharmacy:   [address] [place] [address]  Phone: [phone] Fax: [address] [place] [address]  [address] [place] [address]  Phone: [phone] Fax: [phone]    [facility] - [address] [facility] [place] [address]  Phone: [phone] Fax: [phone]    Primary Care Provider: Brielle Main MD    Primary Insurance: [employer]  Secondary Insurance:     ASSESSMENT:  [address], [address] Representative - Spouse   Primary Phone: [phone] (Mobile)               Advance Directives  Does patient have a [address]?: No  Was patient offered paperwork?: Yes (declined)  Does patient have Advance Directives?: No  Was patient offered paperwork?: Yes (Declined)  Primary Contact: Vika Fuentes-Spouse         Readmission Root Cause  30 Day Readmission: No    Patient Information  Admitted from[de-identified] Home  Mental Status: Alert  During Assessment patient was accompanied by: Not accompanied during assessment  Assessment information provided by[de-identified] Patient  Primary Caregiver: Self  Support Systems: Spouse/significant other  [place] Residence: [place]  Type of Current Residence: [address] home  Upon entering residence, is there a bedroom on the main floor (no further steps)?: No  A bedroom is located on the following floor levels of residence (select all that apply):: 2nd Floor  Upon entering residence, is there a bathroom on the main floor (no further steps)?: Yes  Number of steps to 2nd floor from main floor: One Flight  In the last 12 months, was there a time when you were not able to pay the mortgage or rent on time?: No  In the last 12 months, how many places have you lived?: 1  In the last 12 months, was there a time when you did not have a steady place to sleep or slept in a shelter (including now)?: No  Homeless/housing insecurity resource given?: N/A  Living Arrangements: Lives w/ Spouse/significant other  Is patient a ?: No    Activities of Daily Living Prior to Admission  Functional Status: Independent  Completes ADLs independently?: Yes  Ambulates independently?: Yes  Does patient use assisted devices?: No  Does patient currently own DME?: Yes  What DME does the patient currently own?: Lou Santos  Does patient have a history of Outpatient Therapy (PT/OT)?: No  Does the patient have a history of Short-Term Rehab?: No  Does patient have a history of HHC?: Yes ([facility] months ago)  Does patient currently have [facility]?: No         Patient Information Continued  Income Source: SSI/SSD  Does patient have prescription coverage?: Yes ([address])  Within the past 12 months, you worried that your food would run out before you got the money to buy more : Never true  Within the past 12 months, the food you bought just didn't last and you didn't have money to get more : Never true  Food insecurity resource given?: N/A  Does patient receive dialysis treatments?: No  Does patient have a history of substance abuse?: No  Does patient have a history of Mental Health Diagnosis?: No         Means of Transportation  Means of Transport to Appts[de-identified] Drives Self  In the past 12 months, has lack of transportation kept you from medical appointments or from getting medications?: No  In the past 12 months, has lack of transportation kept you from meetings, work, or from getting things needed for daily living?: No  Was application for public transport provided?: N/A        DISCHARGE DETAILS:    Discharge planning discussed with[de-identified] patient  Freedom of Choice: Yes  Comments - Freedom of Choice: Pending recommendations-patient open to consider [facility] or [facility] if recommended  CM contacted family/caregiver?: No- see comments (A & O)  Were Treatment Team discharge recommendations reviewed with patient/caregiver?: Yes  Did patient/caregiver verbalize understanding of patient care needs?: Yes  Were patient/caregiver advised of the risks associated with not following Treatment Team discharge recommendations?: Yes    Contacts  Patient Contacts: Vika  Relationship to Patient[de-identified] [address]         Is the patient interested in [facility] at discharge?: Yes  [address] requested[de-identified] Home Health Aide, Physical Therapy (OT still needs to see [address] Surgery notified to order)  [address] Provider[de-identified] PCP  Home Health Services Needed[de-identified] Post-Op Care and Assessment, Strengthening/Theraputic Exercises to Improve Function, Evaluate Functional Status and Safety, Gait/ADL Training  Supporting Clincal Findings[de-identified] Bed Bound or Wheelchair Bound, Limited Endurance, Fatigues [place] in [place] HESKA (may need [email or web address])         Other Referral/Resources/Interventions Provided:  Referral Comments: OT still needs to see patient-[facility] notified to order- [place] referral per patient request sent for [facility] in [address] CM to update if any new recommendations needed prior to d/c      Would you like to participate in our [address] service program?  : No - Declined

## 2022-11-08 NOTE — PHYSICAL THERAPY NOTE
Physical Therapy Evaluation     Patient's Name: Tawny Hua    Admitting Diagnosis  Malignant neoplasm of transverse colon ([facility] ) [C18 4]    Problem List  Patient Active Problem List   Diagnosis    Type 2 diabetes mellitus without complication, with long-term current use of insulin ([facility] )    Benign essential hypertension    Mixed hyperlipidemia    Erectile dysfunction    Low testosterone    Obesity (BMI 30-39  9)    Testicular hypogonadism    Incarcerated umbilical hernia    Left ureteral calculus    Type 2 diabetes mellitus with hyperlipidemia (HCC)    Colonic mass    Microcytic anemia    Hypokalemia    Transaminitis    Thrombocytosis    Iron deficiency anemia, unspecified    Malignant neoplasm of transverse colon (HCC)    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)    Colon cancer metastasized to liver [facility])    Colostomy prolapse (HCC)    Other fatigue    Cervical radiculopathy       Past Medical History  Past Medical History:   Diagnosis Date    Abdominal pain 03/12/2022    Acute renal failure ([place] [address] )     [id number] resolved    Cancer ([place] [address] )     Diabetes mellitus ([place] [address] )     Enteritis 08/23/2016    Gastroparesis due to DM ([place] [address] ) 08/23/2016    GERD (gastroesophageal reflux disease)     Hernia, ventral 08/04/2016    Hyperlipidemia     Hypertension     Morbid obesity ([place] [address] ) 04/17/2018    Postoperative visit 03/02/2022    SIRS (systemic inflammatory response syndrome) ([place] [address] ) 03/12/2022    Snoring     Stage 3a chronic kidney disease ([place] [address] ) 02/19/2022       Past Surgical History  Past Surgical History:   Procedure Laterality Date    COLONOSCOPY      COLOSTOMY N/A 02/20/2022    Procedure: COLOSTOMY LOOP, diverting;  Surgeon: Lola Torres MD;  Location: [facility];  Service: General    ESOPHAGOGASTRODUODENOSCOPY N/A 08/24/2016    Procedure: ESOPHAGOGASTRODUODENOSCOPY (EGD); Surgeon: Salomón Arroyo MD;  Location: [facility];   Service:     ILEOSTOMY CLOSURE N/A 11/7/2022    Procedure: REVERSAL COLOSTOMY;  Surgeon: Asuncion Cash Bran Walters MD;  Location: [facility];  Service: Surgical Oncology    IR PORT PLACEMENT  03/10/2022    KIDNEY STONE SURGERY      LIVER BIOPSY LAPAROSCOPIC N/A 02/20/2022    Procedure: DIAGNOSTIC LAPAROSCOPIC LIVER BIOPSY, DIVERTING LOOP COLOSTOMY ;  Surgeon: Yelitza Maldonado MD;  [facility];  Service: General    LIVER LOBECTOMY N/A 11/7/2022    Procedure: SEGMENT 3 LIVER RESECTION, ABLATION, INTRAOPERATIVE U/S OF LIVER, PERITONEAL BIOPSY;  Surgeon: Cristina Robbins MD;  Location: [facility];  Service: Surgical Oncology    RIGHT COLON RESECTION N/A 11/7/2022    Procedure: EX LAP, TRANVERSE COLON RESECTION;  Surgeon: Cristina Robbins MD;  [facility];  Service: Surgical Oncology    TONSILLECTOMY      UMBILICAL HERNIA REPAIR LAPAROSCOPIC N/A 04/26/2019    Procedure: LAPAROSCOPIC UMBILICAL HERNIA REPAIR;  Surgeon: Yelitza Maldonado MD;  Location: [facility];  Service: General        11/08/22 1010   PT Last Visit   PT Visit Date 11/08/22   Note Type   Note type Evaluation   Pain Assessment   Pain Assessment Tool 0-10   Pain Score 5   Pain Location/Orientation Location: Abdomen   Hospital Pain Intervention(s) Repositioned; Emotional support; Ambulation/increased activity   Restrictions/Precautions   Weight Bearing Precautions Per Order No   Other Precautions Chair Alarm; Bed Alarm;Multiple lines; Fall Risk;O2;Pain  (2L O2, PCA pump, rivero)   Home Living   Type of [address] [place] [address] Multi-level;Bed/bath upstairs;1/2 bath on main level  (3 SH, 2 ADRIAN)   Bathroom Shower/Tub Walk-in shower   Bathroom Toilet Standard   Home Equipment Walker;Cane  (did not use PTA)   Prior Function   Level of [place] Independent with ADLs; Independent with functional mobility   Lives With Spouse   Receives Help From Family   IADLs Independent with driving; Independent with meal prep; Independent with medication management   Falls in the last 6 months 0   General   Family/Caregiver Present No   Cognition   Overall Cognitive Status [facility] Arousal/Participation Cooperative   Orientation Level Oriented X4   Memory Within functional limits   Following Commands Follows all commands and directions without difficulty   Comments pt pleasant and cooperative to participate in therapy session   RLE Assessment   RLE Assessment   (functionally 4/5)   LLE Assessment   LLE Assessment   (functionally 4/5)   Light Touch   RLE Light Touch Impaired   RLE Light Touch Comments peripheral neuroapthy   LLE Light Touch Impaired   LLE Light Touch Comments peripheral neuroapthy   Bed Mobility   Supine to Sit 3  Moderate assistance   Additional items Assist x 2;HOB elevated; Bedrails; Increased time required;Verbal cues   Sit to Supine 3  Moderate assistance   Additional items Assist x 2; Increased time required;Verbal cues;LE management   Additional Comments pt supine in bed upon arrival  Pt returned to supine in bed with all needs within reach at the end of therapy session   Transfers   Sit to Stand 3  Moderate assistance   Additional items Assist x 2; Increased time required;Verbal cues   Stand to Sit 3  Moderate assistance   Additional items Assist x 2; Increased time required;Verbal cues   Additional Comments transfers with RW, cues for hand placement and sequencing  (sitting EOb 95/66; after STS + steps to HOB BP 98/66; RN notifed)   Ambulation/Elevation   Gait pattern Excessively slow; Short stride; Step to; Inconsistent farhan;Decreased foot clearance; Improper Weight shift   Gait Assistance 3  Moderate assist   Additional items Assist x 1;Verbal cues; Tactile cues   Assistive Device Rolling walker   Distance lateral side steps toward HOB; limited 2* c/o dizziness and pain   Stair Management Assistance Not tested   Balance   Static Sitting Fair   Dynamic Sitting Fair   Static Standing Poor   Dynamic Standing Poor   Ambulatory Poor   Endurance Deficit   Endurance Deficit Yes   Activity Tolerance   Activity Tolerance Patient limited by fatigue;Treatment limited secondary to medical complications (Comment)  (dizziness, hypotension)   Medical Staff Made Aware Jhonny Stout   Nurse Made Aware RN cleared pt to participate int herapy session   Assessment   Prognosis Good   Problem List Decreased strength;Decreased range of motion;Decreased endurance; Impaired balance;Decreased mobility;Pain   Assessment Pt seen for high complexity PT evaluation  Pt with active PT eval/treat and activity as tolerated orders  Pt is a 61 y o  male who was admitted to [facility] on 11/7/2022 with colon cancer metastasized to liver s/p ex lap transverse colon resection  Pt's current dx/ problem list include: type 2 DM, HTN, obesity, hypokalemia, cervical rediculopathy  Comorbidities affecting pt's physical performance at time of assessment are as follows:  has a past medical history of Abdominal pain (03/12/2022), Acute renal failure ([facility] ), Cancer ([facility] ), Diabetes mellitus ([facility] ), Enteritis (08/23/2016), Gastroparesis due to DM ([facility] ) (08/23/2016), GERD (gastroesophageal reflux disease), Hernia, ventral (08/04/2016), Hyperlipidemia, Hypertension, Morbid obesity ([facility] ) (04/17/2018), Postoperative visit (03/02/2022), SIRS (systemic inflammatory response syndrome) ([facility] ) (03/12/2022), Snoring, and Stage 3a chronic kidney disease ([place] [id number] ) (02/19/2022)  Personal factors affecting pt at time of initial examination include: steps to enter environment, limited home support, advanced age, past experience, inability to perform IADLs, inability to perform ADLs, inability to ambulate household distances   Due to acute medical issues, ongoing medical workup for primary dx; pain, fall risk, increased reliance on more restrictive AD compared to baseline;  decreased activity tolerance compared to baseline, increased assistance needed from caregiver at current time, continuous telemetry monitoring, multiple lines, decline in overall functional mobility status; health management issues; note unstable clinical picture (high complexity)  At baseline, pt resides with spouse and teenage children in [facility] with 2 ADRIAN and was independent prior to hospital admission  Currently, upon initial examination, pt  is requiring mod Ax2 for bed mobility skills; mod Ax2 for functional transfers and mod A for ambulation with RW  Currently, pt presents functioning below baseline and with overall mobility deficits 2* to: decreased LE strength/AROM; limited flexibility;  generalized weakness/ deconditioning; decreased endurance; decreased activity tolerance; decreased coordination; impaired balance; gait deviations; SOB/LOVETT; fatigue; bed/ chair alarms; multiple lines;as well as : weakness, impaired balance, decreased endurance, gait deviations, pain, decreased activity tolerance, decreased functional mobility tolerance and fall risk  Pt currently at increased risk for falls  At the end of evaluation, pt was left supine in bed with all needs in reach  Pt would benefit from continued skilled PT services while in hospital to address remaining limitations and maximize functional potential  PT to continue to follow pt and recommends home with HHPT pending progress  The patient's AM-PAC Basic Mobility Inpatient Short Form Raw Score is 10  A Raw score of less than or equal to 16 suggests the patient may benefit from discharge to post-acute rehabilitation services  Please also refer to the recommendation of the Physical Therapist for safe discharge planning  Goals   Patient Goals to feel better   STG Expiration Date 11/22/22   Short Term Goal #1 STG 1  Pt will be able to perform bed mobility tasks with mod I level in order to improve overall functional mobility and assist in safe d/c  STG 2  Pt with sit EOB for at least 25 minutes at mod I level in order to strengthen abdominal musculature and assist in future transfers/ ambulation  STG 3   Pt will be able to perform functional transfer with mod I level in order to improve overall functional mobility and assist in safe d/c  STG 4  Pt will be able to ambulate at least 250 feet with least restrictive device with mod I level A in order to improve overall functional mobility and assist in safe d/c  STG 5  Pt will improve sitting/standing static/dynamic balance 1/2 grade in order to improve functional mobility and assist in safe d/c  STG 6  Pt will improve LE strength by 1/2 grade in order to improve functional mobility and assist in safe d/c  STG 7  Pt will be able to negotiate at least 2 stairs with least restrictive device with mod I A in order to improve overall functional mobility and assist in safe d/c    PT Treatment Day 0   Plan   Treatment/Interventions ADL retraining;Functional transfer training;LE strengthening/ROM; Endurance training;Patient/family training;Equipment eval/education; Bed mobility;Gait training;Spoke to nursing;Spoke to case management   PT Frequency 3-5x/wk   Recommendation   PT Discharge Recommendation Home with home health rehabilitation  (pending progress)   Additional Comments (S)  anticipate pt to progress to home with HHPT + social support with improvements in pain management   PT to continue to follow and assess  (not cleared to d/c home at this time pending further ambulation and decreased assistance levels)   Additional Comments 2 (S)  recommend OT evaluation/ assessment   AM-PAC Basic Mobility Inpatient   Turning in Bed Without Bedrails 3   Lying on Back to Sitting on Edge of Flat Bed 1   Moving Bed to Chair 1   Standing Up From Chair 1   Walk in Room 2   Climb 3-5 Stairs 2   Basic Mobility Inpatient Raw Score 10   Highest Level Of Mobility   JH-HLM Goal 4: Move to chair/commode   JH-HLM Achieved 4: Move to chair/commode           Leonardo Combs PT, DPT

## 2022-11-08 NOTE — QUICK NOTE
Called to bedside by nursing staff to evaluate patient due to "whole body shaking"  Upon evaluation patient states he is cold  Has been having full body chills/ shivers for around 1 hour  Of note patient also tachypnea and tachycardic to the 110's       Normothermic  Normotensive  Tachy to the 110's  Tachypnea to the 30's  On 1 L NC    PE: RLQ tenderness, soft    Plan  Low suspicion for PE at this time given no increase oxygen requirements  Anastomotic leak on differential  Will obtain full set up labs  CXR, KUB  APS contacted, shivers may be due to epidural, will turn down the rate and monitor

## 2022-11-09 ENCOUNTER — APPOINTMENT (OUTPATIENT)
Dept: RADIOLOGY | Facility: HOSPITAL | Age: 57
End: 2022-11-09

## 2022-11-09 ENCOUNTER — APPOINTMENT (INPATIENT)
Dept: RADIOLOGY | Facility: HOSPITAL | Age: 57
End: 2022-11-09

## 2022-11-09 ENCOUNTER — APPOINTMENT (INPATIENT)
Dept: NON INVASIVE DIAGNOSTICS | Facility: HOSPITAL | Age: 57
End: 2022-11-09

## 2022-11-09 PROBLEM — G93.40 ENCEPHALOPATHY: Status: ACTIVE | Noted: 2022-11-09

## 2022-11-09 LAB
ABO GROUP BLD BPU: NORMAL
ALBUMIN SERPL BCP-MCNC: 2.1 G/DL (ref 3.5–5)
ALP SERPL-CCNC: 192 U/L (ref 46–116)
ALT SERPL W P-5'-P-CCNC: 222 U/L (ref 12–78)
AMORPH URATE CRY URNS QL MICRO: ABNORMAL
ANION GAP SERPL CALCULATED.3IONS-SCNC: 8 MMOL/L (ref 4–13)
ANION GAP SERPL CALCULATED.3IONS-SCNC: 8 MMOL/L (ref 4–13)
AORTIC ROOT: 3.1 CM
APICAL FOUR CHAMBER EJECTION FRACTION: 47 %
APTT PPP: 37 SECONDS (ref 23–37)
ARTERIAL PATENCY WRIST A: NO
ARTERIAL PATENCY WRIST A: NO
AST SERPL W P-5'-P-CCNC: 308 U/L (ref 5–45)
ATRIAL RATE: 124 BPM
BACTERIA UR QL AUTO: ABNORMAL /HPF
BASE EXCESS BLDA CALC-SCNC: -1.6 MMOL/L
BASE EXCESS BLDA CALC-SCNC: -1.8 MMOL/L
BASE EXCESS BLDA CALC-SCNC: -2 MMOL/L
BASE EXCESS BLDA CALC-SCNC: -6.4 MMOL/L
BILIRUB SERPL-MCNC: 1.17 MG/DL (ref 0.2–1)
BILIRUB UR QL STRIP: NEGATIVE
BPU ID: NORMAL
BUN SERPL-MCNC: 33 MG/DL (ref 5–25)
BUN SERPL-MCNC: 38 MG/DL (ref 5–25)
CA-I BLD-SCNC: 1.08 MMOL/L (ref 1.12–1.32)
CA-I BLD-SCNC: 1.11 MMOL/L (ref 1.12–1.32)
CALCIUM ALBUM COR SERPL-MCNC: 10 MG/DL (ref 8.3–10.1)
CALCIUM SERPL-MCNC: 8.5 MG/DL (ref 8.3–10.1)
CALCIUM SERPL-MCNC: 8.9 MG/DL (ref 8.3–10.1)
CARDIAC TROPONIN I PNL SERPL HS: 78 NG/L
CFFMA (FUNCTIONAL FIBRINOGEN MAX AMPLITUDE): 46.8 MM (ref 15–32)
CHLORIDE SERPL-SCNC: 106 MMOL/L (ref 96–108)
CHLORIDE SERPL-SCNC: 109 MMOL/L (ref 96–108)
CKLY30: 1.1 % (ref 0–2.6)
CKR(REACTION TIME): 8.4 MIN (ref 4.6–9.1)
CLARITY UR: ABNORMAL
CO2 SERPL-SCNC: 21 MMOL/L (ref 21–32)
CO2 SERPL-SCNC: 23 MMOL/L (ref 21–32)
COLOR UR: YELLOW
CREAT SERPL-MCNC: 2.13 MG/DL (ref 0.6–1.3)
CREAT SERPL-MCNC: 2.33 MG/DL (ref 0.6–1.3)
CROSSMATCH: NORMAL
CROSSMATCH: NORMAL
CRTMA(RAPIDTEG MAX AMPLITUDE): 70.7 MM (ref 52–70)
E WAVE DECELERATION TIME: 125 MS
ERYTHROCYTE [DISTWIDTH] IN BLOOD BY AUTOMATED COUNT: 13.2 % (ref 11.6–15.1)
ERYTHROCYTE [DISTWIDTH] IN BLOOD BY AUTOMATED COUNT: 13.5 % (ref 11.6–15.1)
FRACTIONAL SHORTENING: 28 (ref 28–44)
GFR SERPL CREATININE-BSD FRML MDRD: 29 ML/MIN/1.73SQ M
GFR SERPL CREATININE-BSD FRML MDRD: 33 ML/MIN/1.73SQ M
GLUCOSE SERPL-MCNC: 167 MG/DL (ref 65–140)
GLUCOSE SERPL-MCNC: 181 MG/DL (ref 65–140)
GLUCOSE SERPL-MCNC: 199 MG/DL (ref 65–140)
GLUCOSE SERPL-MCNC: 208 MG/DL (ref 65–140)
GLUCOSE SERPL-MCNC: 246 MG/DL (ref 65–140)
GLUCOSE SERPL-MCNC: 262 MG/DL (ref 65–140)
GLUCOSE UR STRIP-MCNC: ABNORMAL MG/DL
HCO3 BLDA-SCNC: 19.9 MMOL/L (ref 22–28)
HCO3 BLDA-SCNC: 21.4 MMOL/L (ref 22–28)
HCO3 BLDA-SCNC: 22.4 MMOL/L (ref 22–28)
HCO3 BLDA-SCNC: 22.5 MMOL/L (ref 22–28)
HCT VFR BLD AUTO: 25.6 % (ref 36.5–49.3)
HCT VFR BLD AUTO: 26.6 % (ref 36.5–49.3)
HCT VFR BLD AUTO: 26.7 % (ref 36.5–49.3)
HGB BLD-MCNC: 8.8 G/DL (ref 12–17)
HGB BLD-MCNC: 9.1 G/DL (ref 12–17)
HGB BLD-MCNC: 9.2 G/DL (ref 12–17)
HGB UR QL STRIP.AUTO: ABNORMAL
INR PPP: 1.24 (ref 0.84–1.19)
INTERVENTRICULAR SEPTUM IN DIASTOLE (PARASTERNAL SHORT AXIS VIEW): 1.4 CM
INTERVENTRICULAR SEPTUM: 1.4 CM (ref 0.6–1.1)
KETONES UR STRIP-MCNC: NEGATIVE MG/DL
LAAS-AP2: 16.1 CM2
LAAS-AP4: 11.4 CM2
LACTATE SERPL-SCNC: 1.8 MMOL/L (ref 0.5–2)
LACTATE SERPL-SCNC: 2.1 MMOL/L (ref 0.5–2)
LACTATE SERPL-SCNC: 2.6 MMOL/L (ref 0.5–2)
LACTATE SERPL-SCNC: 5.6 MMOL/L (ref 0.5–2)
LEFT ATRIUM AREA SYSTOLE SINGLE PLANE A4C: 10.3 CM2
LEFT ATRIUM SIZE: 2.4 CM
LEFT INTERNAL DIMENSION IN SYSTOLE: 3.3 CM (ref 2.1–4)
LEFT VENTRICULAR INTERNAL DIMENSION IN DIASTOLE: 4.6 CM (ref 3.5–6)
LEFT VENTRICULAR POSTERIOR WALL IN END DIASTOLE: 1.4 CM
LEFT VENTRICULAR STROKE VOLUME: 55 ML
LEUKOCYTE ESTERASE UR QL STRIP: NEGATIVE
LVSV (TEICH): 55 ML
MAGNESIUM SERPL-MCNC: 2.2 MG/DL (ref 1.6–2.6)
MAGNESIUM SERPL-MCNC: 2.4 MG/DL (ref 1.6–2.6)
MCH RBC QN AUTO: 30.6 PG (ref 26.8–34.3)
MCH RBC QN AUTO: 31.2 PG (ref 26.8–34.3)
MCHC RBC AUTO-ENTMCNC: 34.1 G/DL (ref 31.4–37.4)
MCHC RBC AUTO-ENTMCNC: 34.6 G/DL (ref 31.4–37.4)
MCV RBC AUTO: 90 FL (ref 82–98)
MCV RBC AUTO: 90 FL (ref 82–98)
MUCOUS THREADS UR QL AUTO: ABNORMAL
MV E'TISSUE VEL-SEP: 15 CM/S
MV PEAK A VEL: 1.29 M/S
MV PEAK E VEL: 83 CM/S
MV STENOSIS PRESSURE HALF TIME: 36 MS
MV VALVE AREA P 1/2 METHOD: 6.11
NASAL CANNULA: 10
NASAL CANNULA: 12
NITRITE UR QL STRIP: NEGATIVE
NON VENT TYPE- NRB: 100
NON-SQ EPI CELLS URNS QL MICRO: ABNORMAL /HPF
NRBC BLD AUTO-RTO: 0 /100 WBCS
O2 CT BLDA-SCNC: 12.1 ML/DL (ref 16–23)
O2 CT BLDA-SCNC: 12.6 ML/DL (ref 16–23)
O2 CT BLDA-SCNC: 13.2 ML/DL (ref 16–23)
O2 CT BLDA-SCNC: 13.8 ML/DL (ref 16–23)
OTHER FIO2: ABNORMAL %
OXYHGB MFR BLDA: 89.1 % (ref 94–97)
OXYHGB MFR BLDA: 89.8 % (ref 94–97)
OXYHGB MFR BLDA: 89.9 % (ref 94–97)
OXYHGB MFR BLDA: 96.7 % (ref 94–97)
PCO2 BLDA: 31.5 MM HG (ref 36–44)
PCO2 BLDA: 35.3 MM HG (ref 36–44)
PCO2 BLDA: 35.5 MM HG (ref 36–44)
PCO2 BLDA: 43 MM HG (ref 36–44)
PH BLDA: 7.28 [PH] (ref 7.35–7.45)
PH BLDA: 7.42 [PH] (ref 7.35–7.45)
PH BLDA: 7.42 [PH] (ref 7.35–7.45)
PH BLDA: 7.45 [PH] (ref 7.35–7.45)
PH UR STRIP.AUTO: 5.5 [PH]
PHOSPHATE SERPL-MCNC: 3.4 MG/DL (ref 2.7–4.5)
PHOSPHATE SERPL-MCNC: 5.7 MG/DL (ref 2.7–4.5)
PLATELET # BLD AUTO: 146 THOUSANDS/UL (ref 149–390)
PLATELET # BLD AUTO: 149 THOUSANDS/UL (ref 149–390)
PMV BLD AUTO: 10.1 FL (ref 8.9–12.7)
PMV BLD AUTO: 10.1 FL (ref 8.9–12.7)
PO2 BLDA: 107.4 MM HG (ref 75–129)
PO2 BLDA: 59.3 MM HG (ref 75–129)
PO2 BLDA: 62.8 MM HG (ref 75–129)
PO2 BLDA: 70.5 MM HG (ref 75–129)
POTASSIUM SERPL-SCNC: 4 MMOL/L (ref 3.5–5.3)
POTASSIUM SERPL-SCNC: 4.7 MMOL/L (ref 3.5–5.3)
PR INTERVAL: 152 MS
PROT SERPL-MCNC: 5.2 G/DL (ref 6.4–8.4)
PROT UR STRIP-MCNC: ABNORMAL MG/DL
PROTHROMBIN TIME: 15.8 SECONDS (ref 11.6–14.5)
QRS AXIS: -13 DEGREES
QRSD INTERVAL: 144 MS
QT INTERVAL: 322 MS
QTC INTERVAL: 462 MS
RBC # BLD AUTO: 2.95 MILLION/UL (ref 3.88–5.62)
RBC # BLD AUTO: 2.97 MILLION/UL (ref 3.88–5.62)
RBC #/AREA URNS AUTO: ABNORMAL /HPF
RIGHT ATRIUM AREA SYSTOLE A4C: 10.1 CM2
RIGHT VENTRICLE ID DIMENSION: 3.7 CM
SL CV LEFT ATRIUM LENGTH A2C: 5.1 CM
SL CV LV EF: 55
SL CV PED ECHO LEFT VENTRICLE DIASTOLIC VOLUME (MOD BIPLANE) 2D: 99 ML
SL CV PED ECHO LEFT VENTRICLE SYSTOLIC VOLUME (MOD BIPLANE) 2D: 44 ML
SODIUM SERPL-SCNC: 137 MMOL/L (ref 135–147)
SODIUM SERPL-SCNC: 138 MMOL/L (ref 135–147)
SP GR UR STRIP.AUTO: 1.03 (ref 1–1.03)
SPECIMEN SOURCE: ABNORMAL
T WAVE AXIS: -4 DEGREES
UNIT DISPENSE STATUS: NORMAL
UNIT PRODUCT CODE: NORMAL
UNIT PRODUCT VOLUME: 280 ML
UNIT PRODUCT VOLUME: 350 ML
UNIT PRODUCT VOLUME: 350 ML
UNIT RH: NORMAL
UROBILINOGEN UR STRIP-ACNC: <2 MG/DL
VENTRICULAR RATE: 124 BPM
WBC # BLD AUTO: 19.22 THOUSAND/UL (ref 4.31–10.16)
WBC # BLD AUTO: 20.03 THOUSAND/UL (ref 4.31–10.16)
WBC #/AREA URNS AUTO: ABNORMAL /HPF

## 2022-11-09 PROCEDURE — 5A1945Z RESPIRATORY VENTILATION, 24-96 CONSECUTIVE HOURS: ICD-10-PCS | Performed by: SURGERY

## 2022-11-09 PROCEDURE — 30233N1 TRANSFUSION OF NONAUTOLOGOUS RED BLOOD CELLS INTO PERIPHERAL VEIN, PERCUTANEOUS APPROACH: ICD-10-PCS | Performed by: SURGERY

## 2022-11-09 PROCEDURE — 0BH17EZ INSERTION OF ENDOTRACHEAL AIRWAY INTO TRACHEA, VIA NATURAL OR ARTIFICIAL OPENING: ICD-10-PCS | Performed by: SURGERY

## 2022-11-09 RX ORDER — SUCCINYLCHOLINE/SOD CL,ISO/PF 100 MG/5ML
150 SYRINGE (ML) INTRAVENOUS ONCE
Status: COMPLETED | OUTPATIENT
Start: 2022-11-10 | End: 2022-11-09

## 2022-11-09 RX ORDER — HEPARIN SODIUM 5000 [USP'U]/ML
5000 INJECTION, SOLUTION INTRAVENOUS; SUBCUTANEOUS EVERY 8 HOURS SCHEDULED
Status: DISCONTINUED | OUTPATIENT
Start: 2022-11-09 | End: 2022-11-29

## 2022-11-09 RX ORDER — FENTANYL CITRATE-0.9 % NACL/PF 10 MCG/ML
50 PLASTIC BAG, INJECTION (ML) INTRAVENOUS CONTINUOUS
Status: DISCONTINUED | OUTPATIENT
Start: 2022-11-09 | End: 2022-11-10

## 2022-11-09 RX ORDER — INSULIN GLARGINE 100 [IU]/ML
10 INJECTION, SOLUTION SUBCUTANEOUS
Status: DISCONTINUED | OUTPATIENT
Start: 2022-11-09 | End: 2022-11-09

## 2022-11-09 RX ORDER — SODIUM CHLORIDE, SODIUM GLUCONATE, SODIUM ACETATE, POTASSIUM CHLORIDE, MAGNESIUM CHLORIDE, SODIUM PHOSPHATE, DIBASIC, AND POTASSIUM PHOSPHATE .53; .5; .37; .037; .03; .012; .00082 G/100ML; G/100ML; G/100ML; G/100ML; G/100ML; G/100ML; G/100ML
100 INJECTION, SOLUTION INTRAVENOUS CONTINUOUS
Status: DISCONTINUED | OUTPATIENT
Start: 2022-11-09 | End: 2022-11-11

## 2022-11-09 RX ORDER — DULOXETIN HYDROCHLORIDE 60 MG/1
60 CAPSULE, DELAYED RELEASE ORAL DAILY
Status: DISCONTINUED | OUTPATIENT
Start: 2022-11-09 | End: 2022-11-09

## 2022-11-09 RX ORDER — PANTOPRAZOLE SODIUM 40 MG/10ML
40 INJECTION, POWDER, LYOPHILIZED, FOR SOLUTION INTRAVENOUS
Status: DISCONTINUED | OUTPATIENT
Start: 2022-11-09 | End: 2022-11-12

## 2022-11-09 RX ORDER — CHLORHEXIDINE GLUCONATE 0.12 MG/ML
15 RINSE ORAL EVERY 12 HOURS SCHEDULED
Status: DISCONTINUED | OUTPATIENT
Start: 2022-11-09 | End: 2022-11-12

## 2022-11-09 RX ORDER — PROPOFOL 10 MG/ML
5-50 INJECTION, EMULSION INTRAVENOUS
Status: DISCONTINUED | OUTPATIENT
Start: 2022-11-09 | End: 2022-11-11

## 2022-11-09 RX ORDER — INSULIN LISPRO 100 [IU]/ML
1-5 INJECTION, SOLUTION INTRAVENOUS; SUBCUTANEOUS EVERY 6 HOURS
Status: DISCONTINUED | OUTPATIENT
Start: 2022-11-09 | End: 2022-11-09

## 2022-11-09 RX ORDER — SODIUM CHLORIDE, SODIUM LACTATE, POTASSIUM CHLORIDE, CALCIUM CHLORIDE 600; 310; 30; 20 MG/100ML; MG/100ML; MG/100ML; MG/100ML
100 INJECTION, SOLUTION INTRAVENOUS CONTINUOUS
Status: DISCONTINUED | OUTPATIENT
Start: 2022-11-09 | End: 2022-11-09

## 2022-11-09 RX ORDER — NOREPINEPHRINE BITARTRATE 1 MG/ML
INJECTION, SOLUTION INTRAVENOUS
Status: DISPENSED
Start: 2022-11-09 | End: 2022-11-10

## 2022-11-09 RX ORDER — INSULIN LISPRO 100 [IU]/ML
1-5 INJECTION, SOLUTION INTRAVENOUS; SUBCUTANEOUS EVERY 6 HOURS
Status: DISCONTINUED | OUTPATIENT
Start: 2022-11-10 | End: 2022-11-10

## 2022-11-09 RX ORDER — LABETALOL HYDROCHLORIDE 5 MG/ML
10 INJECTION, SOLUTION INTRAVENOUS EVERY 4 HOURS PRN
Status: DISCONTINUED | OUTPATIENT
Start: 2022-11-09 | End: 2022-11-17

## 2022-11-09 RX ORDER — AMLODIPINE BESYLATE 5 MG/1
5 TABLET ORAL 2 TIMES DAILY
Status: DISCONTINUED | OUTPATIENT
Start: 2022-11-09 | End: 2022-11-09

## 2022-11-09 RX ORDER — FUROSEMIDE 10 MG/ML
20 INJECTION INTRAMUSCULAR; INTRAVENOUS ONCE
Status: COMPLETED | OUTPATIENT
Start: 2022-11-09 | End: 2022-11-09

## 2022-11-09 RX ORDER — ETOMIDATE 2 MG/ML
30 INJECTION INTRAVENOUS ONCE
Status: COMPLETED | OUTPATIENT
Start: 2022-11-10 | End: 2022-11-09

## 2022-11-09 RX ORDER — CALCIUM GLUCONATE 20 MG/ML
1 INJECTION, SOLUTION INTRAVENOUS ONCE
Status: COMPLETED | OUTPATIENT
Start: 2022-11-09 | End: 2022-11-09

## 2022-11-09 RX ORDER — FENTANYL CITRATE 50 UG/ML
50 INJECTION, SOLUTION INTRAMUSCULAR; INTRAVENOUS
Status: DISCONTINUED | OUTPATIENT
Start: 2022-11-09 | End: 2022-11-12

## 2022-11-09 RX ORDER — SODIUM CHLORIDE, SODIUM GLUCONATE, SODIUM ACETATE, POTASSIUM CHLORIDE, MAGNESIUM CHLORIDE, SODIUM PHOSPHATE, DIBASIC, AND POTASSIUM PHOSPHATE .53; .5; .37; .037; .03; .012; .00082 G/100ML; G/100ML; G/100ML; G/100ML; G/100ML; G/100ML; G/100ML
1000 INJECTION, SOLUTION INTRAVENOUS ONCE
Status: COMPLETED | OUTPATIENT
Start: 2022-11-09 | End: 2022-11-09

## 2022-11-09 RX ORDER — METOCLOPRAMIDE 5 MG/1
5 TABLET ORAL 2 TIMES DAILY
Status: DISCONTINUED | OUTPATIENT
Start: 2022-11-09 | End: 2022-11-09

## 2022-11-09 RX ADMIN — CALCIUM GLUCONATE 1 G: 20 INJECTION, SOLUTION INTRAVENOUS at 05:07

## 2022-11-09 RX ADMIN — METOCLOPRAMIDE 5 MG: 10 TABLET ORAL at 17:25

## 2022-11-09 RX ADMIN — HYDROMORPHONE HYDROCHLORIDE 0.5 MG: 1 INJECTION, SOLUTION INTRAMUSCULAR; INTRAVENOUS; SUBCUTANEOUS at 13:44

## 2022-11-09 RX ADMIN — CALCIUM GLUCONATE 2 G: 20 INJECTION, SOLUTION INTRAVENOUS at 00:04

## 2022-11-09 RX ADMIN — PIPERACILLIN AND TAZOBACTAM 3.38 G: 36; 4.5 INJECTION, POWDER, FOR SOLUTION INTRAVENOUS at 05:07

## 2022-11-09 RX ADMIN — METOPROLOL TARTRATE 25 MG: 25 TABLET, FILM COATED ORAL at 08:04

## 2022-11-09 RX ADMIN — HEPARIN SODIUM 5000 UNITS: 5000 INJECTION INTRAVENOUS; SUBCUTANEOUS at 13:33

## 2022-11-09 RX ADMIN — ACETAMINOPHEN 975 MG: 325 TABLET, FILM COATED ORAL at 13:32

## 2022-11-09 RX ADMIN — IOHEXOL 98 ML: 350 INJECTION, SOLUTION INTRAVENOUS at 23:06

## 2022-11-09 RX ADMIN — INSULIN LISPRO 5 UNITS: 100 INJECTION, SOLUTION INTRAVENOUS; SUBCUTANEOUS at 11:41

## 2022-11-09 RX ADMIN — NOREPINEPHRINE BITARTRATE 5 MCG/MIN: 1 INJECTION, SOLUTION, CONCENTRATE INTRAVENOUS at 23:00

## 2022-11-09 RX ADMIN — PROPOFOL 30 MCG/KG/MIN: 10 INJECTION, EMULSION INTRAVENOUS at 22:30

## 2022-11-09 RX ADMIN — HYDROMORPHONE HYDROCHLORIDE 0.5 MG: 1 INJECTION, SOLUTION INTRAMUSCULAR; INTRAVENOUS; SUBCUTANEOUS at 21:40

## 2022-11-09 RX ADMIN — METHOCARBAMOL TABLETS 750 MG: 750 TABLET, COATED ORAL at 21:00

## 2022-11-09 RX ADMIN — FUROSEMIDE 20 MG: 10 INJECTION, SOLUTION INTRAMUSCULAR; INTRAVENOUS at 08:04

## 2022-11-09 RX ADMIN — HYDROMORPHONE HYDROCHLORIDE 0.5 MG: 1 INJECTION, SOLUTION INTRAMUSCULAR; INTRAVENOUS; SUBCUTANEOUS at 05:20

## 2022-11-09 RX ADMIN — DULOXETINE HYDROCHLORIDE 60 MG: 60 CAPSULE, DELAYED RELEASE ORAL at 11:38

## 2022-11-09 RX ADMIN — METOPROLOL TARTRATE 25 MG: 25 TABLET, FILM COATED ORAL at 21:00

## 2022-11-09 RX ADMIN — HYDROMORPHONE HYDROCHLORIDE 0.5 MG: 1 INJECTION, SOLUTION INTRAMUSCULAR; INTRAVENOUS; SUBCUTANEOUS at 07:18

## 2022-11-09 RX ADMIN — ATORVASTATIN CALCIUM 40 MG: 40 TABLET, FILM COATED ORAL at 17:25

## 2022-11-09 RX ADMIN — AMLODIPINE BESYLATE 5 MG: 5 TABLET ORAL at 17:25

## 2022-11-09 RX ADMIN — ACETAMINOPHEN 975 MG: 325 TABLET, FILM COATED ORAL at 21:00

## 2022-11-09 RX ADMIN — PANTOPRAZOLE SODIUM 40 MG: 40 INJECTION, POWDER, FOR SOLUTION INTRAVENOUS at 08:04

## 2022-11-09 RX ADMIN — Medication 10 MG: at 21:45

## 2022-11-09 RX ADMIN — Medication 150 MG: at 22:30

## 2022-11-09 RX ADMIN — BACLOFEN 10 MG: 10 TABLET ORAL at 08:04

## 2022-11-09 RX ADMIN — INSULIN LISPRO 5 UNITS: 100 INJECTION, SOLUTION INTRAVENOUS; SUBCUTANEOUS at 07:45

## 2022-11-09 RX ADMIN — INSULIN LISPRO 1 UNITS: 100 INJECTION, SOLUTION INTRAVENOUS; SUBCUTANEOUS at 21:00

## 2022-11-09 RX ADMIN — Medication 10 MG: at 13:43

## 2022-11-09 RX ADMIN — HEPARIN SODIUM 5000 UNITS: 5000 INJECTION INTRAVENOUS; SUBCUTANEOUS at 21:00

## 2022-11-09 RX ADMIN — HYDROMORPHONE HYDROCHLORIDE 0.5 MG: 1 INJECTION, SOLUTION INTRAMUSCULAR; INTRAVENOUS; SUBCUTANEOUS at 02:21

## 2022-11-09 RX ADMIN — ETOMIDATE 30 MG: 20 INJECTION, SOLUTION INTRAVENOUS at 22:30

## 2022-11-09 RX ADMIN — INSULIN LISPRO 1 UNITS: 100 INJECTION, SOLUTION INTRAVENOUS; SUBCUTANEOUS at 16:11

## 2022-11-09 RX ADMIN — INSULIN LISPRO 1 UNITS: 100 INJECTION, SOLUTION INTRAVENOUS; SUBCUTANEOUS at 11:40

## 2022-11-09 RX ADMIN — INSULIN GLARGINE 10 UNITS: 100 INJECTION, SOLUTION SUBCUTANEOUS at 21:00

## 2022-11-09 RX ADMIN — FENTANYL CITRATE: 50 INJECTION INTRAMUSCULAR; INTRAVENOUS at 19:18

## 2022-11-09 RX ADMIN — ROPIVACAINE HYDROCHLORIDE: 5 INJECTION EPIDURAL; INFILTRATION; PERINEURAL at 00:31

## 2022-11-09 RX ADMIN — ERTAPENEM SODIUM 1000 MG: 1 INJECTION, POWDER, LYOPHILIZED, FOR SOLUTION INTRAMUSCULAR; INTRAVENOUS at 16:06

## 2022-11-09 RX ADMIN — INSULIN LISPRO 2 UNITS: 100 INJECTION, SOLUTION INTRAVENOUS; SUBCUTANEOUS at 07:44

## 2022-11-09 RX ADMIN — AMLODIPINE BESYLATE 5 MG: 5 TABLET ORAL at 11:38

## 2022-11-09 RX ADMIN — METOCLOPRAMIDE 10 MG: 10 TABLET ORAL at 09:35

## 2022-11-09 RX ADMIN — PIPERACILLIN AND TAZOBACTAM 3.38 G: 3; .375 INJECTION, POWDER, FOR SOLUTION INTRAVENOUS at 09:37

## 2022-11-09 RX ADMIN — METHOCARBAMOL TABLETS 750 MG: 750 TABLET, COATED ORAL at 13:32

## 2022-11-09 RX ADMIN — FENTANYL CITRATE 100 MCG: 50 INJECTION INTRAMUSCULAR; INTRAVENOUS at 22:45

## 2022-11-09 RX ADMIN — SODIUM CHLORIDE, SODIUM GLUCONATE, SODIUM ACETATE, POTASSIUM CHLORIDE, MAGNESIUM CHLORIDE, SODIUM PHOSPHATE, DIBASIC, AND POTASSIUM PHOSPHATE 1000 ML: .53; .5; .37; .037; .03; .012; .00082 INJECTION, SOLUTION INTRAVENOUS at 22:15

## 2022-11-09 NOTE — QUICK NOTE
STAT CTH, CTA CAP obtained, significant for 8 5cm LUQ hematoma (lateral to stomach, near splenic flexure), with no evidence of active extravasation (as discussed and reviewed with on-call Radiologist)  CTH with no evidence of acute intracranial hemorrhage or infarct  Will upgrade pt to Critical Care, and obtain serial H/H, with serial abdominal exams and continued resuscitation       Jacinta Hansen MD  PGY-5, General Surgery  11/8/2022

## 2022-11-09 NOTE — PROGRESS NOTES
Progress Note - Madison Andrea 61 y o  male MRN: [id number]    [facility]: [id number]      CC: diabetes f/u    Subjective: This is H 30 y o -year-old male with past medical history of type 2 diabetes mellitus, hypertension, hyperlipidemia, GERD, iron deficiency anemia , 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 who presented to the hospital for ex lap, transverse colon resection, segment 3 liver resection, ablation, reversal of colostomy on 11/07   Endocrinology consulted for the management of type 2 diabetes mellitus in the setting of liver metastases  Patient was noted to be transferred to MICU overnight, also found to have left upper quadrant hematoma  Diet is now changed to NPO with sips of water  No hypoglycemia    Objective:     Vitals: Blood pressure 122/73, pulse 92, temperature 97 9 °F (36 6 °C), resp  rate 15, height 5' 8" (1 727 m), weight 112 kg (247 lb), SpO2 98 %  ,Body mass index is 37 56 kg/m²  Intake/Output Summary (Last 24 hours) at 11/9/2022 1617  Last data filed at 11/9/2022 1001  Gross per 24 hour   Intake 4759 57 ml   Output 2075 ml   Net 2684 57 ml       Physical Exam:  General Appearance: awake, appears stated age and cooperative  Head: Normocephalic, without obvious abnormality, atraumatic  Extremities: moves all extremities  Skin: Skin color and temperature normal    Pulm: no labored breathing    Lab, Imaging and other studies: I have personally reviewed pertinent reports        POC Glucose (mg/dl)   Date Value   11/09/2022 181 (H)   11/09/2022 208 (H)   11/09/2022 262 (H)   11/09/2022 246 (H)   11/08/2022 152 (H)   11/08/2022 149 (H)   11/08/2022 224 (H)   11/08/2022 223 (H)   11/08/2022 179 (H)   11/07/2022 182 (H)       Assessment and plan:    Type 2 diabetes mellitus with hyperglycemia  HbA1c 8 0 September 2022  Home regimen:  Januvia 100 mg daily, metformin glyburide 5-500 mg QD, CGM [place]  Inpatient regimen:   Lantus 15 units at bedtime, Humalog 5 units with meals, correctional scale algorithm 1 with meals and at bedtime     Recommendations:  Decrease Lantus to 10 units from 15 units at bedtime  Discontinue Humalog 5 units with meals as patient is NPO  Continue with the correctional scale algorithm 1 with meals and at bedtime  Will continue to monitor blood glucose and make adjustments as needed  Monitor for hypoglycemia and treat according to the protocol    Portions of the record may have been created with voice recognition software

## 2022-11-09 NOTE — CONSULTS
Consultation - Infectious Disease   Darrion Grant 61 y o  male MRN: [id number]  Unit/Bed#: MICU 02 Encounter: [id number]    IMPRESSION & RECOMMENDATIONS:   1  Sepsis with hemorrhagic shock  Tachycardia and leukocytosis present on admission with development of tachypnea, lactic acidosis, and hypotension requiring pressor support  Secondary to ESBL e coli bacteremia in setting of recent colon surgery  Now also with LUQ hematoma noted on new abdominal imaging  Patient required PRBC and plasma transfusion  Patient has made some clinical improvement  WBC count has trended down  He no longer requires pressor support   -antibiotic as below  -check CBCD and CMP tomorrow  -follow up blood cultures  -monitor vitals  -supportive care    2  ESBL e coli bacteremia  Likely secondary to gut translocation during recent colon surgery  Also with LUQ post-op hemorrhage  Patient had been receiving IV Zosyn but was transitioned to Ertapenem earlier today  I recommend keeping the IV Ertapenem for now while we await final sensitivities  Given port-a-cath is in place I will recheck blood cultures tomorrow morning   -continue IV ertapenem  -check CBCD and CMP tomorrow  -follow up blood cultures  -recheck blood cultures tomorrow morning   -monitor vitals    3  Metastatic colon cancer  Patient with known metastasis to liver  Patient follows closely with Dr Adithya Neri  He is now s/p transverse colon resection, loop colostomy reversal, peritoneal bx, and segment 3 liver resection on 11/7/2022  It has been determined he will require additional R hepatic resection in the future    -continue close follow up with surgical oncology     4  Acute kidney injury  On CKD stage 3  Likely due to sepsis and hemorrhagic shock above  Upon review of patient's available past medical records it appears his baseline creatinine is approximately 1 2  Patient's creatinine has peaked at 2 28   It has improved to 2 13 today   -check creatinine tomorrow  -dose adjust antibiotic for renal function as needed  -avoid nephrotoxins  -volume management per critical care team    5  Acute hypoxic respiratory failure  Patient currently requiring mid-flow nasal cannula O2  I personally reviewed his chest imaging which is suggestive of pulmonary edema    -volume management per critical care team  -recommend incentive spirometry, OOB as tolerated once medical stable  -monitor vitals  -monitor respiratory status  -O2 support per critical care team     6  Acute encephalopathy  Likely multifactorial in setting of sepsis, hemorrhagic shock, hypoxia, and ESBL e coli bacteremia  Patient very difficult to wake up during my visit but was eventually able to tell me his name, wife's name, and that he was in [place]  Also remembered his nurse and recalled my name towards end of my visit  I reviewed his head CT which showed no acute findings   -antibiotic as above  -volume management per critical care team  -serial neuro exams  -monitor mood and mental status  -supportive care    7  Type 2 diabetes mellitus without long term insulin use  Patient's last HbA1c was 8 4% on 8/19/2022  Elevated blood glucose is risk factor for infection  Inpatient endocrinology is following patient closely and are titrating insulin dosing   -blood glucose management per endocrinology     8  Erythromycin allergy  Patient reports reaction of GI intolerance  We will avoid this medication for now   -monitor patient for adverse medication reactions    9  R chest port-a-cath in place  10  Morbid obesity  BMI = 37 56  We will continue to follow along with the patient  Above plan was discussed in detail with patient at the bedside  Above plan was discussed in detail with critical care team     HISTORY OF PRESENT ILLNESS:  Reason for Consult: ESBL producing bacteria infection  HPI: Tayla Hines is a 61y o  year old male who presented to the [place] [id number] on 11/7/2022 for scheduled surgery with Dr Sima Neri  Patient with metastatic colon cancer which appeared resectable on imaging  A pre-operative thoracic epidural was placed for post-operative analgesia  Patient underwent exploratory laparotomy, transverse colon resection, segment 3 liver resection, ablation, intraoperative U/S of liver, peritoneal biopsy, reversal of colostomy, and takedown of splenic flexure  He was given cefazolin and flagyl intraop  It was determined he would additionally need surgery for R hepatic resection in the future  He was admitted postoperatively for ongoing medical and surgical management  After his admission endocrinology was consulted for insulin titration  Acute pain team continues to follow patient post operatively  He developed L shoulder pain and EKG revealed paroxysmal atrial tachycardia  Cardiology was consulted and patient was placed on telemetry  Later that evening he became tachycardic, tachypneic, mentally altered, and hypertensive  Blood cultures were sent  Patient underwent stat head CT and CT PE STUDY C/A/P  A LUQ hematoma was noted  Patient was given 2 units PRBC and 1 unit of plasma  He was started on Zosyn and transferred to critical care  Now patient is showing GNR in both sets of his blood cultures  BCI suggests E coli ESBL  He has been transitioned to IV ertapenem  We have been asked for formal consult for ESBL producing bacteria infection  The patient has GERD, DMII, HTN, obesity, CKD stage 3, hyperlipidemia, gastroparesis, and colon cancer with known metastasis to the liver  He has a history of snoring, SIRS, ventral hernia, enteritis, renal failure, colonoscopy, liver lobectomy, tonsillectomy, colostomy, EDG, port placement, kidney stones, umbilical hernia repair, and R colon resection  He has allergies to shellfish derived products and erythromycin  REVIEW OF SYSTEMS:  Unable to perform ROS as patient is confused   Wife at bedside reports overall he's had no complaints today other than some abdominal pain at times  PAST MEDICAL HISTORY:  Past Medical History:   Diagnosis Date   • Abdominal pain 03/12/2022   • Acute renal failure ([place] [address] )     [id number] resolved   • Cancer ([place] [address] )    • Diabetes mellitus ([place] [address] )    • Enteritis 08/23/2016   • Gastroparesis due to DM ([place] [address] ) 08/23/2016   • GERD (gastroesophageal reflux disease)    • Hernia, ventral 08/04/2016   • Hyperlipidemia    • Hypertension    • Morbid obesity ([place] [address] ) 04/17/2018   • Postoperative visit 03/02/2022   • SIRS (systemic inflammatory response syndrome) ([place] [address] ) 03/12/2022   • Snoring    • Stage 3a chronic kidney disease ([place] [address] ) 02/19/2022     Past Surgical History:   Procedure Laterality Date   • COLONOSCOPY     • COLOSTOMY N/A 02/20/2022    Procedure: COLOSTOMY LOOP, diverting;  Surgeon: Paul Mack MD;  [facility]: [facility];  Service: General   • ESOPHAGOGASTRODUODENOSCOPY N/A 08/24/2016    Procedure: ESOPHAGOGASTRODUODENOSCOPY (EGD); Surgeon: Emily Joshi MD;  Location[facility];   Service:    • ILEOSTOMY CLOSURE N/A 11/7/2022    Procedure: REVERSAL COLOSTOMY;  Surgeon: Felix Parish MD;  [facility];  Service: Surgical Oncology   • IR PORT PLACEMENT  03/10/2022   • KIDNEY STONE SURGERY     • LIVER BIOPSY LAPAROSCOPIC N/A 02/20/2022    Procedure: DIAGNOSTIC LAPAROSCOPIC LIVER BIOPSY, DIVERTING LOOP COLOSTOMY ;  Surgeon: Paul Mack MD;  [facility];  Service: General   • LIVER LOBECTOMY N/A 11/7/2022    Procedure: SEGMENT 3 LIVER RESECTION, ABLATION, INTRAOPERATIVE U/S OF LIVER, PERITONEAL BIOPSY;  Surgeon: Felix Parish MD;  [facility];  Service: Surgical Oncology   • RIGHT COLON RESECTION N/A 11/7/2022    Procedure: EX LAP, TRANVERSE COLON RESECTION;  Surgeon: Felix Parish MD;  [facility];  Service: Surgical Oncology   • TONSILLECTOMY     • UMBILICAL HERNIA REPAIR LAPAROSCOPIC N/A 04/26/2019    Procedure: LAPAROSCOPIC UMBILICAL HERNIA REPAIR;  Surgeon: Paul Mack MD;  [facility]; Service: General     FAMILY HISTORY:  Non-contributory    SOCIAL HISTORY:  Social History   Social History     Substance and Sexual Activity   Alcohol Use Never     Social History     Substance and Sexual Activity   Drug Use No     Social History     Tobacco Use   Smoking Status Never Smoker   Smokeless Tobacco Never Used     ALLERGIES:  Allergies   Allergen Reactions   • Shellfish-Derived Products - Food Allergy Anaphylaxis   • Erythromycin GI Intolerance     MEDICATIONS:  All current active medications have been reviewed  ANTIBIOTICS:  Ertapenem 1  Antibiotics 3    PHYSICAL EXAM:  Temp:  [98 1 °F (36 7 °C)-99 3 °F (37 4 °C)] 98 2 °F (36 8 °C)  HR:  [] 94  Resp:  [13-38] 13  BP: ()/(58-96) 116/79  SpO2:  [86 %-98 %] 98 %  Temp (24hrs), Av 7 °F (37 1 °C), Min:98 1 °F (36 7 °C), Max:99 3 °F (37 4 °C)  Current: Temperature: 98 2 °F (36 8 °C)    Intake/Output Summary (Last 24 hours) at 2022 1453  Last data filed at 2022 1001  Gross per 24 hour   Intake 6836 24 ml   Output 2075 ml   Net 4761 24 ml     General Appearance:  Appearing ill, clammy, in no acute distress  Patient appears comfortable sitting up in bed  Head:  Normocephalic, without obvious abnormality, atraumatic  Eyes:  Conjunctiva pink and sclera anicteric, both eyes  Nose: Nares normal, mucosa normal, no drainage  Throat: Oropharynx moist without lesions  Neck: Supple, symmetrical, no adenopathy, no tenderness/mass/nodules  Lungs:   Crackles to auscultation bilaterally, respirations unlabored on mid-flow nasal cannula O2  Chest Wall:  No tenderness or deformity  No erythema or edema overlying patient's R chest port-a-cath  Heart:  Tachycardic; no murmur, rub or gallop  Abdomen:   Soft, obese, mildly distended, positive bowel sounds throughout  Patient with facial grimace during palpation  Surgical incision with intact staples, no active weeping/bleeding  Extremities: No cyanosis or clubbing, no edema   B/L venodynes on  Genitourinary: Negro intact  Skin: No rashes or lesions noted on exposed skin  Lymph nodes: Cervical, supraclavicular nodes normal    Neurologic: Unable to fully assess, patient did know his name, wife's name, and that he was in [place]  Was very difficult to wake up  LABS, IMAGING, & OTHER STUDIES:  Lab Results:  I have personally reviewed pertinent labs  Results from last 7 days   Lab Units 11/09/22  1135 11/09/22 0423 11/09/22  0207 11/08/22 2230 11/08/22 1913   WBC Thousand/uL 19 22* 20 03*  --   --  10 68*   HEMOGLOBIN g/dL 9 1* 9 2* 8 8*   < > 8 6*   PLATELETS Thousands/uL 146* 149  --   --  225    < > = values in this interval not displayed  Results from last 7 days   Lab Units 11/09/22  0423 11/08/22  1913 11/08/22  0631 11/07/22  0835   POTASSIUM mmol/L 4 0 4 5   < >  --    CHLORIDE mmol/L 106 106   < >  --    CO2 mmol/L 23 22   < >  --    CO2, I-STAT mmol/L  --   --   --  26   BUN mg/dL 33* 31*   < >  --    CREATININE mg/dL 2 13* 2 28*   < >  --    EGFR ml/min/1 73sq m 33 30   < >  --    GLUCOSE, ISTAT mg/dl  --   --   --  169*   CALCIUM mg/dL 8 5 8 1*   < >  --    AST U/L 308* 379*  --   --    ALT U/L 222* 263*   < >  --    ALK PHOS U/L 192* 179*   < >  --     < > = values in this interval not displayed  Results from last 7 days   Lab Units 11/08/22 2230   GRAM STAIN RESULT  Gram negative rods*  Gram negative rods*     Imaging Studies:   I have personally reviewed pertinent imaging study reports and images in PACS  XR CHEST PORTABLE 11/9/2022 - I personally reviewed this image which showed trace B/L pleural effusions and B/L perihilar opacities suggestive of pulmonary edema  R chest port in place  CTA CHEST (PE STUDY) ABDOMEN PELVIS ROUTINE CONTRAST 11/8/2022 - I personally reviewed this study which showed B/L atelectasis and scattered ground glass opacities throughout the lungs  No PE  Patient has L pericardiac air and fluid without rim enhancement   R chest port-a-cath intact  There is hypoattenuation of the left later liver segment  There are lesions on the R hepatic lobe  Gallbladder, spleen, and pancreas are unremarkable  No obstruction or hydronephrosis of B/L kidneys  There is reversal of patient's colostomy with anastomosis in the distal transverse colon  There is hemorrhage in the LUQ close to anastomosis extending superiorly lateral to the stomach  There is hyperattenuating fluid in the pelvis  Patient has mild wall thickening and inflammatory changes along the cecum  Colon distal to the anastomosis is collapsed  CT HEAD WO CONTRAST 11/8/2022 - I personally reviewed this study which showed no acute intracranial changes  Patient with no shift, hemorrhage, infarct, or mass  XR CHEST PORTABLE 11/9/2022 - I personally reviewed this image which showed B/L pulmonary edema and small pleural effusions

## 2022-11-09 NOTE — PROCEDURES
Arterial Line Insertion    Date/Time: 11/8/2022 11:52 PM  Performed by: Fantasma Hand DO  Authorized by: Fantasma Hand DO     Patient location:  ICU  Other Assisting Provider: No    Consent:     Consent obtained:  Verbal    Consent given by:  Patient    Risks discussed:  Bleeding, ischemia, pain and infection  Universal protocol:     Procedure explained and questions answered to patient or proxy's satisfaction: yes      Patient identity confirmed:  Verbally with patient and arm band  Indications:     Indications: hemodynamic monitoring, multiple ABGs, continuous blood pressure monitoring and frequent labs / infusion    Pre-procedure details:     Skin preparation:  Chlorhexidine    Preparation: Patient was prepped and draped in sterile fashion    Anesthesia (see MAR for exact dosages): Anesthesia method:  None  Procedure details:     Location / Tip of Catheter:  Radial    Laterality:  Right    Needle gauge:  18 G    Placement technique:  Percutaneous    Number of attempts:  1    Successful placement: yes      Transducer: waveform confirmed    Post-procedure details:     Post-procedure:  Sterile dressing applied and sutured    CMS:  Normal    Patient tolerance of procedure:   Tolerated well, no immediate complications

## 2022-11-09 NOTE — QUICK NOTE
Upon evaluation, patient with an acute change in mental status within the past hour per nursing staff, now difficult to arouse, with increasing tachycardia and tachypnea  Currently with BP of 140/70, HR in 130s, RR in 40s, with SpO2 of 89% on 5L NC  On exam, pt with RLQ tenderness, A&O x1 (oriented to self), but significantly drowsy and difficult to arouse       Plan:  --Will order STAT CTA CAP for full evaluation  --STAT Labs pending  --1L IVF bolus currently running  --Upgrade level of care to SD1    Gabriella Cao MD  PGY-5, General Surgery  11/8/2022

## 2022-11-09 NOTE — CONSULTS
Neurology Consult Note    Name: Tawny Hua   Age & Sex: 61 y o  male   MRN: [id number]  Unit/Bed#: MICU 02   Encounter: [id number]  Length of Stay: 2    Assessment plan:    Encephalopathy  Assessment & Plan  62 yo male noted to be increasingly encephalopathic in setting of recent procedures due to metastatic colon cancer that eventually led to an episode of hypotension, tachycardia with discovery of LUQ hematoma requiring blood transfusion  CTH negative  Neurologic exam does show right visual field deficit, however, no significant mental status deficit  It appears that patient's symptoms may be due to metabolic toxic etiologies, but ischemic brain injury cannot be entirely excluded given visual field deficit on exam and prior episodes of hypotension  Plan:  · Recommend checking ammonia levels  · Recommend MRI brain without contrast  · Rest of care as per primary team    Recommendations for outpatient neurological follow up have yet to be determined  Pending for discharge: requiring MICU level of care     Subjective:    Reason for Consult / Principal Problem:  Encephalopathy  Hx and PE limited by:  None    HPI: Tawny Hua is a 61 y o   male admitted for transverse colon resection in setting of metastatic colon cancer  Pertinent PMH:  DM type 2, HTN, HLD, metastatic colon cancer s/p ex lap, transverse colon resection, reversal of loop colostomy, peritoneal biopsy, and segment 3 liver resection on 11/7/2022  Overnight on 11/7-11/8 patient was found to be hypotensive, tachycardic and tachypneic  Workup showed LUQ hematoma with no active extravasation  He was transferred to ICU and transfused with 2 units PRBCs, 1 unit FFP with appropriate response  Patient was noted to be encephalopathic and Neurology was consulted for evaluation  CTH negative      This morning, during interview/exam, patient complains of no CP, worsening SOB, difficulty speaking, difficulty swallowing, new onset weakness, new onset numbness/tingling, abdominal pain  Discuss clinical plan with his wife at bedside as well: Both of them verbalized understanding  Inpatient consult to Neurology  Consult performed by: Concepcion Lopez MD  Consult ordered by: Marcial Conde DO          Historical Information   Past Medical History:   Diagnosis Date   • Abdominal pain 03/12/2022   • Acute renal failure ([place] [address] )     [id number] resolved   • Cancer ([place] [address] )    • Diabetes mellitus ([place] [address] )    • Enteritis 08/23/2016   • Gastroparesis due to DM ([place] [address] ) 08/23/2016   • GERD (gastroesophageal reflux disease)    • Hernia, ventral 08/04/2016   • Hyperlipidemia    • Hypertension    • Morbid obesity ([place] [address] ) 04/17/2018   • Postoperative visit 03/02/2022   • SIRS (systemic inflammatory response syndrome) ([place] [address] ) 03/12/2022   • Snoring    • Stage 3a chronic kidney disease ([place] [address] ) 02/19/2022     Past Surgical History:   Procedure Laterality Date   • COLONOSCOPY     • COLOSTOMY N/A 02/20/2022    Procedure: COLOSTOMY LOOP, diverting;  Surgeon: Alondra Resendez MD;  [facility];  Service: General   • ESOPHAGOGASTRODUODENOSCOPY N/A 08/24/2016    Procedure: ESOPHAGOGASTRODUODENOSCOPY (EGD); Surgeon: Belle Sams MD;  Location: [facility];   Service:    • ILEOSTOMY CLOSURE N/A 11/7/2022    Procedure: REVERSAL COLOSTOMY;  Surgeon: Robert Wells MD;  Location: [facility];  Service: Surgical Oncology   • IR PORT PLACEMENT  03/10/2022   • KIDNEY STONE SURGERY     • LIVER BIOPSY LAPAROSCOPIC N/A 02/20/2022    Procedure: DIAGNOSTIC LAPAROSCOPIC LIVER BIOPSY, DIVERTING LOOP COLOSTOMY ;  Surgeon: Alondra Resendez MD;  [facility];  Service: General   • LIVER LOBECTOMY N/A 11/7/2022    Procedure: SEGMENT 3 LIVER RESECTION, ABLATION, INTRAOPERATIVE U/S OF LIVER, PERITONEAL BIOPSY;  Surgeon: Robert Wells MD;  Location[facility];  Service: Surgical Oncology   • RIGHT COLON RESECTION N/A 11/7/2022    Procedure: EX LAP, TRANVERSE COLON RESECTION;  Surgeon: Doraine Dandy Walter Kyle MD;  Location:  MAIN OR;  Service: Surgical Oncology   • TONSILLECTOMY     • UMBILICAL HERNIA REPAIR LAPAROSCOPIC N/A 2019    Procedure: LAPAROSCOPIC UMBILICAL HERNIA REPAIR;  Surgeon: Lola Torres MD;  [facility];  Service: General     Social History   Social History     Substance and Sexual Activity   Alcohol Use Never     Social History     Substance and Sexual Activity   Drug Use No     E-Cigarette/Vaping   • E-Cigarette Use Never User      E-Cigarette/Vaping Substances   • Nicotine No    • THC No    • CBD No    • Flavoring No    • Other No    • Unknown No      Social History     Tobacco Use   Smoking Status Never Smoker   Smokeless Tobacco Never Used     Family History:   Family History   Problem Relation Age of Onset   • Diabetes Mother         mellitus   • Lung cancer Mother    • Coronary artery disease Father      Meds/Allergies   all current active meds have been reviewed  Allergies   Allergen Reactions   • Shellfish-Derived Products - Food Allergy Anaphylaxis   • Erythromycin GI Intolerance       Review of previous medical records was completed  Review of Systems:  See HPI  Objective:     Patient ID: Tawny Hua is a 61 y o  male  Vitals:   Vitals:    22 1400 22 1451 22 1600 22 1800   BP: 116/79  122/73 118/82   Pulse: 100 94 92 86   Resp: 16 13 15 12   Temp:   97 9 °F (36 6 °C)    TempSrc:       SpO2: 96% 98% 98% 98%   Weight:       Height:          Body mass index is 37 56 kg/m²  Intake/Output Summary (Last 24 hours) at 2022 1901  Last data filed at 2022 1740  Gross per 24 hour   Intake 4747 57 ml   Output 2175 ml   Net 2572 57 ml       Temperature:   Temp (24hrs), Av 7 °F (37 1 °C), Min:97 9 °F (36 6 °C), Max:99 3 °F (37 4 °C)    Temperature: 97 9 °F (36 6 °C)    Invasive Devices:    Invasive Devices  Report    Central Venous Catheter Line  Duration           Port A Cath 03/10/22 Right Chest 244 days Peripheral Intravenous Line  Duration           Peripheral IV 11/07/22 Left Hand 2 days    Peripheral IV 11/07/22 Right Hand 2 days    Peripheral IV 11/09/22 Left;Proximal;Ventral (anterior) Forearm <1 day    Peripheral IV 11/09/22 Proximal;Right;Ventral (anterior) Forearm <1 day          Epidural Line  Duration           Epidural Catheter 11/07/22 2 days          Arterial Line  Duration           Arterial Line 11/08/22 Radial <1 day          Drain  Duration           Urethral Catheter Latex 16 Fr  2 days              Physical Exam:   Vitals reviewed  General Examination: appears very anxious, cooperative  CVS: S1, S2 noted  Neurologic Exam:   Mental Status: A, O x4  Follows simple, 3 step commands  Attention, memory intact  No neglect  Language: Normal fluency and comprehension  Cranial Nerves:  Pupils equal   Unable to discriminate number of fingers, finger movement in right visual field (persistent on repeat exam on the evening)  EOMI, no nystagums  Face symmetric with equal activation  No dysarthria  Hearing intact grossly to voice  Tongue midline  Uvula midline  No palatal dysfunction, elevates symmetrically  Motor:  No pronator drift  Appropriate antigravity strength in all extremities proximally, distally  Reflexes: +2, and symmetric (biceps, brachioradialis, triceps, patella)  Sensory: Light touch intact in all 4 extremities proximally, distally  Coordination: Finger to nose normal b/l  Gait:  Deferred    Labs: I have personally reviewed pertinent reports      Results from last 7 days   Lab Units 11/09/22  1135 11/09/22  0423 11/09/22  0207 11/08/22  2230 11/08/22  1913 11/08/22  0631   WBC Thousand/uL 19 22* 20 03*  --   --  10 68* 21 96*   HEMOGLOBIN g/dL 9 1* 9 2* 8 8*   < > 8 6* 10 1*   HEMATOCRIT % 26 7* 26 6* 25 6*   < > 26 6* 29 7*   PLATELETS Thousands/uL 146* 149  --   --  225 327   NEUTROS PCT %  --   --   --   --   --  88*   MONOS PCT %  --   --   --   --   --  7   MONO PCT %  -- --   --   --  0*  --     < > = values in this interval not displayed  Results from last 7 days   Lab Units 11/09/22 0423 11/08/22 1913 11/08/22  0631 11/07/22  0835   POTASSIUM mmol/L 4 0 4 5 4 9  --    CHLORIDE mmol/L 106 106 108  --    CO2 mmol/L 23 22 21  --    CO2, I-STAT mmol/L  --   --   --  26   BUN mg/dL 33* 31* 24  --    CREATININE mg/dL 2 13* 2 28* 1 73*  --    CALCIUM mg/dL 8 5 8 1* 8 2*  --    ALK PHOS U/L 192* 179*  --   --    ALT U/L 222* 263*  --   --    AST U/L 308* 379*  --   --    GLUCOSE, ISTAT mg/dl  --   --   --  169*     Results from last 7 days   Lab Units 11/09/22 0423 11/08/22 1913 11/08/22  0631   MAGNESIUM mg/dL 2 2 2 5 1 5*     Results from last 7 days   Lab Units 11/09/22 0423 11/08/22 1913   PHOSPHORUS mg/dL 3 4 3 7          Results from last 7 days   Lab Units 11/09/22  0737   LACTIC ACID mmol/L 1 8           Imaging and diagnostic studies:    Radiology Results: I have personally reviewed pertinent reports  XR chest portable   Final Result by Carlos Gaytan MD (11/09 3174)      1  Pulmonary edema and trace bilateral pleural effusions  2   Retrocardiac opacification and medial right lower lung zone opacification, likely due to atelectasis and/or pleural effusions  Workstation performed: [id number]         CTA chest (pe study) abdomen pelvis routine contrast   Final Result by Hanh Siddiqi MD (11/08 2136)      1  No pulmonary embolism  2   Acute hemorrhage in the left upper abdomen close to the anastomosis  3   Mild wall thickening and inflammation of the cecum is nonspecific, though can represent postoperative change  4   Pulmonary edema with moderate sized bilateral pleural effusions  5   Left retrocardiac air and fluid is new since prior study and may represent fluid which extended through the esophageal hiatus  There is no rim enhancement to suggest abscess formation at this time            I personally discussed this study with [facility] Keke Phillips on 11/8/2022 at 8:50 PM and 9:35 PM          Workstation performed: [id number]         CT head wo contrast   Final Result by Verito Conti MD (11/08 2118)      No acute territorial infarct, hemorrhage, or midline shift  Workstation performed: [id number]         XR chest portable   Final Result by Authur Sever, MD (11/09 1598)      Pulmonary edema  Workstation performed: [id number]             Other Diagnostic Testing: I have personally reviewed pertinent reports        Active medications:  Current Facility-Administered Medications   Medication Dose Route Frequency   • acetaminophen (TYLENOL) tablet 975 mg  975 mg Oral Q8H [address]   • amLODIPine (NORVASC) tablet 5 mg  5 mg Oral BID   • atorvastatin (LIPITOR) tablet 40 mg  40 mg Oral Daily With Dinner   • DULoxetine (CYMBALTA) delayed release capsule 60 mg  60 mg Oral Daily   • ertapenem (INVanz) 1,000 mg in sodium chloride 0 9 % 50 mL IVPB  1,000 mg Intravenous Q24H   • heparin (porcine) subcutaneous injection 5,000 Units  5,000 Units Subcutaneous Q8H [address]   • HYDROmorphone (DILAUDID) injection 0 5 mg  0 5 mg Intravenous Q2H PRN   • insulin glargine (LANTUS) subcutaneous injection 10 Units 0 1 mL  10 Units Subcutaneous HS   • insulin lispro (HumaLOG) 100 units/mL subcutaneous injection 1-5 Units  1-5 Units Subcutaneous TID AC   • insulin lispro (HumaLOG) 100 units/mL subcutaneous injection 1-5 Units  1-5 Units Subcutaneous HS   • labetalol (NORMODYNE) injection 10 mg  10 mg Intravenous Q4H PRN   • methocarbamol (ROBAXIN) tablet 750 mg  750 mg Oral Q8H [address]   • metoclopramide (REGLAN) tablet 5 mg  5 mg Oral BID   • metoprolol tartrate (LOPRESSOR) tablet 25 mg  25 mg Oral Q12H SHANNON   • ondansetron (ZOFRAN) injection 4 mg  4 mg Intravenous Q4H PRN   • pantoprazole (PROTONIX) injection 40 mg  40 mg Intravenous Q24H SHANNON   • ropivacaine 0 1% and fentaNYL 2 mcg/mL PCEA   Epidural Continuous       Prior to Admission medications    Medication Sig Start Date End Date Taking? Authorizing Provider   amLODIPine (NORVASC) 5 mg tablet TAKE 1 TABLET BY MOUTH TWICE A DAY 6/30/22  Yes Rodolfo Soliman MD   aspirin 81 MG tablet Take 81 mg by mouth daily  Yes Historical Provider, MD   atorvastatin (LIPITOR) 40 mg tablet TAKE 1 TABLET BY MOUTH EVERY DAY 6/30/22  Yes Rodolfo Soliman MD   Cholecalciferol (VITAMIN D3 PO) Take 400 Units by mouth daily   Yes Historical Provider, MD   DULoxetine (Cymbalta) 30 mg delayed release capsule Take 1 capsule (30 mg total) by mouth daily for 7 days, THEN 2 capsules (60 mg total) daily for 21 days   10/21/22 11/18/22 Yes Nella Posey MD   glyBURIDE-metFORMIN (GLUCOVANCE) 5-500 MG per tablet Take 1 tablet by mouth daily with breakfast Taking 1 tablet in the morning    Yes Historical Provider, MD   Januvia 100 MG tablet TAKE 1 TABLET BY MOUTH EVERY DAY 3/11/22  Yes Rodolfo Soliman MD   Lantus SoloStar 100 units/mL SOPN INJECT 30 UNITS UNDER THE SKIN DAILY  Patient taking differently: Inject 35 Units under the skin daily 9/29/22  Yes Rodolfo Soliman MD   lisinopril (ZESTRIL) 40 mg tablet TAKE 1 TABLET BY MOUTH EVERY DAY 3/11/22  Yes Rodolfo Soliman MD   methocarbamol (ROBAXIN) 500 mg tablet Take 1 tablet (500 mg total) by mouth 2 (two) times a day 10/26/22   Rodolfo Soliman MD   metoclopramide (REGLAN) 10 mg tablet TAKE 1 TABLET BY MOUTH TWICE A DAY 9/5/22  Yes Rodolfo Soliman MD   Multiple Vitamins-Minerals (multivitamin with minerals) tablet Take 1 tablet by mouth daily   Yes Historical Provider, MD   pantoprazole (PROTONIX) 40 mg tablet TAKE 1 TABLET BY MOUTH TWICE A DAY 5/8/22  Yes Rodolfo Soliman MD   Continuous Blood Gluc Sensor (FreeStyle [place]rish 14 Day Sensor) MISC Use 1 each every 14 (fourteen) days 3/11/22   GAURANG Rushing   Insulin Pen Needle (B-D UF III MINI PEN NEEDLES) 31G X 5 MM MISC Inject under the skin daily 5/13/22   Rodolfo Soliman MD   Needle, Disp, (HYPODERMIC NEEDLE) 23G X 1-1/2" MISC by Does not apply route once a week 5/10/18   Nataliya Cai PA-C   ondansetron (ZOFRAN) 4 mg tablet Take 4 mg by mouth every 6 (six) hours as needed 1/10/22   Historical Provider, MD   Ostomy Supplies MISC Use in the morning 2/23/22   Demarcus Ramos MD         Code status and advanced directives:  Code Status: Level 1 - Full Code      VTE Pharmacologic Prophylaxis: Heparin  VTE Mechanical Prophylaxis: sequential compression device    ==  Merlin Sear , MD [facility], PGY-II

## 2022-11-09 NOTE — ADDENDUM NOTE
Addendum  created 11/09/22 6481 by Catrachita Ayobu MD    Order list changed, Pharmacy for encounter modified

## 2022-11-09 NOTE — PROGRESS NOTES
Cardiology Team 2 Progress Note - Savage Holloway 61 y o  male MRN: [id number]    [facility]: [id number]          Subjective:   Patient seen and examined  Patient noted to be hypotensive, tachycardic and tachypnic overnight  A CTH and CTA CAP obtained which demonstrated LUQ hematoma w/ no active extravasation  He was transferred to ICU for further resusication  Patient was transfused 2u prbc and 1u ffp which he responded appropriately  Remains hemodynamically stable on my evaluation this AM   I/O 7100/1300 with net positive 5800cc and total 8200cc since admission  Hospital Course:   Savage Holloway is a 61y o  year old male with a history of obesity, T2DM, HTN, CKD stage 3, metastatic colon cancer s/p ex lap with transverse colon resection, reverasal of colostomy, segment 3 liver resection  Cardiology was consulted for new left shoulder pain and new EKG findings noted on 11/8/22  Shoulder pain at the time was attributed to MSK and EKG changes notable for paroxsymal atrial tachycardia precipitated likely from post-surgery for which patient was initiated on beta blockers  Vitals: Blood pressure 161/94, pulse 102, temperature 98 2 °F (36 8 °C), temperature source Oral, resp  rate 16, height 5' 8" (1 727 m), weight 112 kg (247 lb), SpO2 95 %  , Body mass index is 37 56 kg/m² ,   Orthostatic Blood Pressures    Flowsheet Row Most Recent Value   Blood Pressure 161/94 filed at 11/09/2022 0702   Patient Position - Orthostatic VS Lying filed at 11/08/2022 2100            Intake/Output Summary (Last 24 hours) at 11/9/2022 0859  Last data filed at 11/9/2022 0615  Gross per 24 hour   Intake 7096 24 ml   Output 1300 ml   Net 5796 24 ml       Review of System:  Review of system was conducted and was negative except for as stated in the subjective course      Physical Exam:    GEN: Savage Holloway appears well, alert and oriented x 3, pleasant and cooperative   HEENT:  Normocephalic, atraumatic, anicteric, moist mucous membranes  NECK: No JVD or carotid bruits   HEART: reg rhythm, tachy rate, normal S1 and S2, no murmurs, clicks, gallops or rubs   LUNGS: rancorous breath sounds bilaterally with wet crackles in bilateral lower lung fields   ABDOMEN:  Normoactive bowel sounds, soft, vertical and horizontal abdominal incisions with staples  C/D/I  Slightly tender to palpation  No fluid wave     EXTREMITIES: peripheral pulses palpable; no edema  NEURO: no gross focal findings; cranial nerves grossly intact   SKIN:  Dry, intact, warm to touch      Current Facility-Administered Medications:   •  acetaminophen (TYLENOL) tablet 975 mg, 975 mg, Oral, Q8H [address], Bright Crystal Thompson MD  •  atorvastatin (LIPITOR) tablet 40 mg, 40 mg, Oral, Daily With Pascual Still MD, 40 mg at 11/08/22 1628  •  baclofen tablet 10 mg, 10 mg, Oral, TID, Jennifer Patel MD, 10 mg at 11/09/22 0804  •  [COMPLETED] metroNIDAZOLE (FLAGYL) IVPB (premix) 500 mg 100 mL, 500 mg, Intravenous, Once, Stopped at 11/07/22 0805 **AND** ceFAZolin (ANCEF) IVPB (premix in dextrose) 2,000 mg 50 mL, 2,000 mg, Intravenous, Once, Amelia Garcia MD  •  heparin (porcine) subcutaneous injection 5,000 Units, 5,000 Units, Subcutaneous, Q8H [address], Perudainlo CaicedoStephan, DO  •  HYDROmorphone (DILAUDID) injection 0 5 mg, 0 5 mg, Intravenous, Q2H PRN, Reshma Roth MD, 0 5 mg at 11/09/22 0718  •  insulin glargine (LANTUS) subcutaneous injection 15 Units 0 15 mL, 15 Units, Subcutaneous, HS, Merlyn Du MD  •  insulin lispro (HumaLOG) 100 units/mL subcutaneous injection 1-5 Units, 1-5 Units, Subcutaneous, TID AC, 2 Units at 11/09/22 0744 **AND** Fingerstick Glucose (POCT), , , TID AC, Merlyn Du MD  •  insulin lispro (HumaLOG) 100 units/mL subcutaneous injection 1-5 Units, 1-5 Units, Subcutaneous, HS, Merlyn Du MD  •  insulin lispro (HumaLOG) 100 units/mL subcutaneous injection 5 Units, 5 Units, Subcutaneous, TID With Meals, RODNEY Ginette Bennett MD, 5 Units at 11/09/22 0745  •  labetalol (NORMODYNE) injection 10 mg, 10 mg, Intravenous, Q4H PRN, Fernanda Preston DO  •  lactated ringers bolus 1,000 mL, 1,000 mL, Intravenous, Once, Nadia Hutson MD  •  methocarbamol (ROBAXIN) tablet 750 mg, 750 mg, Oral, Q8H [address], Shon Hodgson MD, 750 mg at 11/08/22 1417  •  metoclopramide (REGLAN) tablet 10 mg, 10 mg, Oral, BID, Avelino Peabody, MD, 10 mg at 11/08/22 1759  •  metoprolol tartrate (LOPRESSOR) tablet 25 mg, 25 mg, Oral, Q12H [address], Anyi Mark MD, 25 mg at 11/09/22 0804  •  ondansetron (ZOFRAN) injection 4 mg, 4 mg, Intravenous, Q4H PRN, Avelino Peabody, MD  •  pantoprazole (PROTONIX) injection 40 mg, 40 mg, Intravenous, Q24H [address], Fernanda Preston DO, 40 mg at 11/09/22 0804  •  piperacillin-tazobactam (ZOSYN) 3 375 g in sodium chloride 0 9 % 100 mL IVPB (EXTENDED-INFUSION), 3 375 g, Intravenous, Q8H, Miley Acosta DO  •  ropivacaine 0 1% and fentaNYL 2 mcg/mL PCEA, , Epidural, Continuous, Ramírez Holliday MD, Restarted at 11/09/22 0643    Labs & Results:  Results from last 7 days   Lab Units 11/08/22  1922 11/08/22  1257 11/08/22  1116 11/08/22  0914   HS TNI 0HR ng/L  --   --   --  7   HS TNI 2HR ng/L  --   --  6  --    HS TNI 4HR ng/L  --  7  --   --    HS TNI RAND ng/L 9  --   --   --          Results from last 7 days   Lab Units 11/09/22  0423 11/08/22  1913 11/08/22  0631   POTASSIUM mmol/L 4 0 4 5 4 9   CO2 mmol/L 23 22 21   CHLORIDE mmol/L 106 106 108   BUN mg/dL 33* 31* 24   CREATININE mg/dL 2 13* 2 28* 1 73*     Results from last 7 days   Lab Units 11/09/22  0423 11/09/22  0207 11/08/22  2230 11/08/22  1913 11/08/22  0631   HEMOGLOBIN g/dL 9 2* 8 8* 7 5* 8 6* 10 1*   HEMATOCRIT % 26 6* 25 6* 22 1* 26 6* 29 7*   PLATELETS Thousands/uL 149  --   --  225 327           Telemetry:   Personally reviewed by Hannah Filter:     VTE Prophylaxis: Heparin      Fluid at b/l lung bases with blunting of coastal angles  Assessment:  Principal Problem:    Colon cancer metastasized to liver [facility])  Active Problems:    Malignant neoplasm of transverse colon ([address] )        1  Paroxsymal Atrial Tachycardia  2  Hypertension  3  Metastatic Colon Cancer s/p Transverse Colon Resection, Reverasal of colostomy and Segment 3 liver resection (11/8/22)  4  Diabetes Mellitus, Type 2  5  ALEXY on CKD3  --> Peak Cr 2 28 with a baseline around 1 2  6  Acute Blood Loss Anemia 2/2 LUQ Hematoma in setting of Post-Op Abdominal Surgery  --> Pre-Op Hgb around 10 and post-op low around 7 5  --> s/p 2u pRBC and 1 FFP (11/8/22)    Plan:  - c/w Lipitor 40mg PO QHS  - c/w Metoprolol Tart  25mg PO BID  - f/u TTE  - Lasix 20mg IV x1 given CXR and Oxygen of nearly 8L  Will continue to monitor output and dose accordingly  Case discussed and reviewed with Dr Cali Salcedo who agrees with my assessment and plan  Thank you for involving us in the care of your patient  Cathi Valdez MD  Cardiology Fellow PGY-6      Epic/ Allscripts/Care Everywhere records reviewed:     ** Please Note: Fluency DirectDictation voice to text software may have been used in the creation of this document   **

## 2022-11-09 NOTE — PROGRESS NOTES
The patient’s standard-infusion Piperacillin-Tazobactam / Zosyn (infused over 30-60 minutes) has been converted to extended-infusion (infused over 4 hours) per [facility], [facility] Extended-Infusion Piperacillin-Tazobactam Protocol for Adults as approved by the Pharmacy and Therapeutics Committee (accessible here on MyNET)       The patient met ALL eligible criteria:    Age >= 25years old   Critical Care patient    And did NOT have ANY exclusions:     Emergency Department or Operating Room patient  Drug incompatibilities that could NOT be avoided with timing or separate line administration    The following are reminders for Nursing regarding administration:  Infuse the first dose of Zosyn over 30min as a load (if new start), and then all subsequent doses will be given as an extended-infusion over 4 hours (see dosing below)  Use primary tubing as an intermittent infusion; change out primary tubing every 24 hours   Ensure full dose of the medication is given at the appropriate rate  Most incompatible drugs can be scheduled during times when the Zosyn is not being infused; however, if one requires administration during the same time, a separate site or lumen MUST be used  If access is limited and an incompatible medication urgently needs to be given, the Zosyn extended-infusion can be held for up to 30min (remember to flush line before/after)  Extended-infusion Zosyn does NOT require special timing around hemodialysis (it can even be given simultaneously)  If a patient needs an urgent MRI while Zosyn is infusing and there is not a MRI-compatible pump available for use, finish the infusion over the traditional length (30min) and ask Pharmacy to reschedule the next doses so that they start a few hours earlier  Pharmacy will assist nursing in troubleshooting other administration issues as they arise  Dosing for Piperacillin-Tazobactam  CrCl (mL/min) Traditional Dosing Extended-Infusion Dosing #   CrCl > 40 High-Dose  CrCl > 40 Low-Dose 4 5g Q6H (over 30min)  3 375g IV Q6H (over 30min) 3 375g IV Q8H (over 4hr)*    *1st dose loaded over 30min, then start extended-infusion dosing 4hr later   CrCl 20-40 High-Dose  CrCl 20-40 Low-Dose 3 375g IV Q6H (over 30min)  2 25g IV Q6H (over 30min)    CrCl < 20 High-Dose  CrCl < 20 Low-Dose 2 25g IV Q6H (over 30min)  2 25g IV Q8H (over 30min) 3 375g IV Q12H (over 4hr)*    *1st dose loaded over 30min, then start extended-infusion dosing 6hr later   Hemo/Peritoneal Dialysis High-Dose  Hemo/Peritoneal Dialysis Low-Dose 2 25g IV Q6H (over 30min)  2 25g IV Q8H (over 30min)    CVVH/D High-Dose  CVVH/D Low-Dose 3 375g IV Q6H (over 30min)  2 25 IV Q6H (over 30min) 3 375g IV Q8H (over 4hr)*    *1st dose loaded over 30min, then start extended-infusion dosing 4hr later   # = Use 4 5g dosing (same interval) if morbidly obese (BMI ?40)    Please call the Pharmacy with any questions or concerns      Db Nevarez, PharmD  PGY1 Pharmacy Resident

## 2022-11-09 NOTE — ADDENDUM NOTE
Addendum  created 11/09/22 0009 by Kristine Montez MD    Order list changed, Pharmacy for encounter modified

## 2022-11-09 NOTE — CONSULTS
Consultation - Palliative and Supportive Care   Rudene Record 61 y o  male [id number]    Patient Active Problem List   Diagnosis   • Type 2 diabetes mellitus without complication, with long-term current use of insulin (HCC)   • Benign essential hypertension   • Mixed hyperlipidemia   • Erectile dysfunction   • Low testosterone   • Obesity (BMI 30-39  9)   • Testicular hypogonadism   • Incarcerated umbilical hernia   • Left ureteral calculus   • Type 2 diabetes mellitus with hyperlipidemia (HCC)   • Colonic mass   • Microcytic anemia   • Hypokalemia   • Transaminitis   • Thrombocytosis   • Iron deficiency anemia, unspecified   • Malignant neoplasm of transverse colon (HCC)   • 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 liver [facility])   • Colon cancer metastasized to liver [facility])   • Colostomy prolapse (HCC)   • Other fatigue   • Cervical radiculopathy     Active issues specifically addressed today include:   Metastatic colon cancer postop colon resection and liver resection  Cancer associated pain  Palliative care patient    Plan:  1  Symptom management - appreciate EPS management of epidural at this time  Will plan to transition to oral regimen in the coming days as mental status improves  2  Goals - full cares no limits       3  Psychosocial support - time spent with spouse providing supportive listening       4  PSC follow-up- will continue to closely follow       Code Status:  Full - Level 1   Decisional apparatus:  Patient is not competent on my exam today  If competence is lost, patient's substitute decision maker would default to spouse by [place] Act 169  Advance Directive / Living Will / POLST:  No     I have reviewed the patient's controlled substance dispensing history in the Prescription Drug Monitoring Program in compliance with the [facility] regulations before prescribing any controlled substances  We appreciate the invitation to be involved in this patient's care  We will follow    Please do not hesitate to reach our on call provider through our clinic answering service at  should you have acute symptom control concerns  Juhi Villanueva Service: [phone]  You can find me on [employer]! IDENTIFICATION:  Inpatient consult to Palliative Care  Consult performed by: Alice Nichole DO  Consult ordered by: Bonnie Dancer, PA-C        Physician Requesting Consult: Zeyad Man DO  Reason for Consult / Principal Problem:  Pain  Hx and PE limited by:  Patient currently resting    HISTORY OF PRESENT ILLNESS:         Celena Herrera is a 61 y o  male who presented on November 7th for elective surgery due to metastatic colon cancer  Patient underwent ex lap with transverse colon resection, liver resection, ablation and intraoperative ultrasound of liver with peritoneal biopsy  Patient is currently being monitored in the ICU and has an epidural in place being managed by APS  Palliative Care has been consulted to take over pain management in the coming days as PCEA will be discontinued likely in the next 24 to 48 hours  Patient currently resting and spouse asked me not to disturb him at this time  Overnight events were reviewed and patient had been transferred to the ICU for hypotension tachycardia  A CT was done that time demonstrated a left upper quadrant hematoma with no extravasation and received 2 units of PRBCs and 1 unit of FFP with appropriate response  Patient is well supported by his spouse, Slava Tejeda and his 2 children age 16 and 15  Patient has been on [place] after and his gunnar additions are Anabaptist  Review of Systems   Unable to perform ROS: other   Patient resting       Past Medical History:   Diagnosis Date   • Abdominal pain 03/12/2022   • Acute renal failure ([facility] )     [id number] resolved   • Cancer ([facility] )    • Diabetes mellitus ([facility] )    • Enteritis 08/23/2016   • Gastroparesis due to DM ([facility] ) 08/23/2016   • GERD (gastroesophageal reflux disease)    • Hernia, ventral 08/04/2016   • Hyperlipidemia    • Hypertension    • Morbid obesity ([facility] ) 04/17/2018   • Postoperative visit 03/02/2022   • SIRS (systemic inflammatory response syndrome) ([facility] ) 03/12/2022   • Snoring    • Stage 3a chronic kidney disease ([facility] ) 02/19/2022     Past Surgical History:   Procedure Laterality Date   • COLONOSCOPY     • COLOSTOMY N/A 02/20/2022    Procedure: COLOSTOMY LOOP, diverting;  Surgeon: John Justin MD;  Location: [facility];  Service: General   • ESOPHAGOGASTRODUODENOSCOPY N/A 08/24/2016    Procedure: ESOPHAGOGASTRODUODENOSCOPY (EGD); Surgeon: Katharine Pool MD;  Location: [facility];   Service:    • ILEOSTOMY CLOSURE N/A 11/7/2022    Procedure: REVERSAL COLOSTOMY;  Surgeon: Sawyer Qureshi MD;  Location: [facility];  Service: Surgical Oncology   • IR PORT PLACEMENT  03/10/2022   • KIDNEY STONE SURGERY     • LIVER BIOPSY LAPAROSCOPIC N/A 02/20/2022    Procedure: DIAGNOSTIC LAPAROSCOPIC LIVER BIOPSY, DIVERTING LOOP COLOSTOMY ;  Surgeon: John Justin MD;  Location: [facility];  Service: General   • LIVER LOBECTOMY N/A 11/7/2022    Procedure: SEGMENT 3 LIVER RESECTION, ABLATION, INTRAOPERATIVE U/S OF LIVER, PERITONEAL BIOPSY;  Surgeon: Sawyer Qureshi MD;  Location: [facility];  Service: Surgical Oncology   • RIGHT COLON RESECTION N/A 11/7/2022    Procedure: EX LAP, TRANVERSE COLON RESECTION;  Surgeon: Sawyer Qureshi MD;  Location: [facility];  Service: Surgical Oncology   • TONSILLECTOMY     • UMBILICAL HERNIA REPAIR LAPAROSCOPIC N/A 04/26/2019    Procedure: LAPAROSCOPIC UMBILICAL HERNIA REPAIR;  Surgeon: John Justin MD;  Location: [facility];  Service: General     Social History     Socioeconomic History   • Marital status: /Civil Union     Spouse name: Not on file   • Number of children: Not on file   • Years of education: Not on file   • Highest education level: Not on file   Occupational History   • Occupation: ACTOR     Employer: [employer]   Tobacco Use   • Smoking status: Never Smoker   • Smokeless tobacco: Never Used   Vaping Use   • Vaping Use: Never used   Substance and Sexual Activity   • Alcohol use: Never   • Drug use: No   • Sexual activity: Yes     Partners: Female   Other Topics Concern   • Not on file   Social History Narrative   • Not on file     Social Determinants of Health     Financial Resource Strain: Not on file   Food Insecurity: No Food Insecurity   • Worried About Running Out of Food in the Last Year: Never true   • Ran Out of Food in the Last Year: Never true   Transportation Needs: No Transportation Needs   • Lack of Transportation (Medical): No   • Lack of Transportation (Non-Medical):  No   Physical Activity: Insufficiently Active   • Days of Exercise per Week: 5 days   • Minutes of Exercise per Session: 10 min   Stress: No Stress Concern Present   • Feeling of Stress : Not at all   Social Connections: Not on file   Intimate Partner Violence: Not on file   Housing Stability: Low Risk    • Unable to Pay for Housing in the Last Year: No   • Number of Places Lived in the Last Year: 1   • Unstable Housing in the Last Year: No     Family History   Problem Relation Age of Onset   • Diabetes Mother         mellitus   • Lung cancer Mother    • Coronary artery disease Father        MEDICATIONS / ALLERGIES:    all current active meds have been reviewed and current meds:   Current Facility-Administered Medications   Medication Dose Route Frequency   • acetaminophen (TYLENOL) tablet 975 mg  975 mg Oral Q8H [address]   • amLODIPine (NORVASC) tablet 5 mg  5 mg Oral BID   • atorvastatin (LIPITOR) tablet 40 mg  40 mg Oral Daily With Dinner   • DULoxetine (CYMBALTA) delayed release capsule 60 mg  60 mg Oral Daily   • ertapenem (INVanz) 1,000 mg in sodium chloride 0 9 % 50 mL IVPB  1,000 mg Intravenous Q24H   • heparin (porcine) subcutaneous injection 5,000 Units  5,000 Units Subcutaneous Q8H [address]   • HYDROmorphone (DILAUDID) injection 0 5 mg  0 5 mg Intravenous Q2H PRN   • insulin glargine (LANTUS) subcutaneous injection 10 Units 0 1 mL  10 Units Subcutaneous HS   • insulin lispro (HumaLOG) 100 units/mL subcutaneous injection 1-5 Units  1-5 Units Subcutaneous TID AC   • insulin lispro (HumaLOG) 100 units/mL subcutaneous injection 1-5 Units  1-5 Units Subcutaneous HS   • labetalol (NORMODYNE) injection 10 mg  10 mg Intravenous Q4H PRN   • methocarbamol (ROBAXIN) tablet 750 mg  750 mg Oral Q8H [address]   • metoclopramide (REGLAN) tablet 5 mg  5 mg Oral BID   • metoprolol tartrate (LOPRESSOR) tablet 25 mg  25 mg Oral Q12H [address]   • ondansetron (ZOFRAN) injection 4 mg  4 mg Intravenous Q4H PRN   • pantoprazole (PROTONIX) injection 40 mg  40 mg Intravenous Q24H SHANNON   • ropivacaine 0 1% and fentaNYL 2 mcg/mL PCEA   Epidural Continuous       Allergies   Allergen Reactions   • Shellfish-Derived Products - Food Allergy Anaphylaxis   • Erythromycin GI Intolerance       OBJECTIVE:    Physical Exam  Physical Exam  Vitals and nursing note reviewed  Constitutional:       General: He is not in acute distress  Appearance: He is obese  He is ill-appearing  HENT:      Head: Normocephalic and atraumatic  Right Ear: External ear normal       Left Ear: External ear normal       Nose: Nose normal    Eyes:      General: No scleral icterus  Right eye: No discharge  Left eye: No discharge  Cardiovascular:      Rate and Rhythm: Normal rate and regular rhythm  Pulmonary:      Effort: Pulmonary effort is normal  No respiratory distress  Breath sounds: Normal breath sounds  Abdominal:      General: Bowel sounds are normal  There is no distension  Palpations: Abdomen is soft  Skin:     General: Skin is warm and dry  Coloration: Skin is pale  Neurological:      Mental Status: He is alert  Lab Results:   I have personally reviewed pertinent labs  , CBC:   Lab Results   Component Value Date    WBC 19 22 (H) 11/09/2022    HGB 9 1 (L) 11/09/2022    HCT 26 7 (L) 11/09/2022    MCV 90 11/09/2022     (L) 11/09/2022    MCH 30 6 11/09/2022    MCHC 34 1 11/09/2022    RDW 13 5 11/09/2022    MPV 10 1 11/09/2022    NRBC 0 11/09/2022   , CMP:   Lab Results   Component Value Date    SODIUM 137 11/09/2022    K 4 0 11/09/2022     11/09/2022    CO2 23 11/09/2022    BUN 33 (H) 11/09/2022    CREATININE 2 13 (H) 11/09/2022    CALCIUM 8 5 11/09/2022     (H) 11/09/2022     (H) 11/09/2022    ALKPHOS 192 (H) 11/09/2022    EGFR 33 11/09/2022   , PT/PTT:No results found for: PT, PTT  Imaging Studies: reviewed  EKG, Pathology, and Other Studies: reviewed    Counseling / Coordination of Care    Total floor / unit time spent today 35+ minutes  Greater than 50% of total time was spent with the patient and / or family counseling and / or coordination of care  A description of the counseling / coordination of care:  Chart review, medication review, discussion with spouse, discussion with care team, supportive listening  Portions of this document may have been created using dictation software and as such some "sound alike" terms may have been generated by the system  Do not hesitate to contact me with any questions or clarifications

## 2022-11-09 NOTE — PROGRESS NOTES
Epidural Follow-up Note - Acute Pain Service    Sebastian Spain 61 y o  male MRN: [id number]  Unit/Bed#: MICU 02 Encounter: [id number]      Assessment:   Principal Problem:    Colon cancer metastasized to liver [facility])  Active Problems:    Malignant neoplasm of transverse colon ([address] )      Sebastian Spain is a 61y o  year old male status post ex lap, transverse colon resection, reversal of loop colostomy, peritoneal biopsy and segment 3 liver resection for metastatic colon cancer  Had preoperative placement of thoracic epidural catheter for postop pain  Plan:  Analgesia:  - Continue Thoracic epidural infusion of Ropivacaine 0 1% with fentanyl 2 mcg/mL at 10/5/10/3  - Continue Dilaudid 0 5 mg IV q2hr PRN for breakthrough pain  - Anticipate epidural removal and transition to primarily PO regimen 2-3 days    Bowel Regimen:  - Bowel regimen when appropriate from surgical perspective    APS will continue to follow  Please contact Acute Pain Service - [facility] via [facility] from [phone] with additional questions or concerns  See FélixAlediajuliette or Kristel for additional contacts and after hours information  Pain History  Current pain location(s):  Abdomen  Pain Scale:   8 to 10/10  Quality:  Unable to describe  24 hour history:  Patient with minimal pain at rest, however significant pain with movement in bed  Feels “loopy” after receiving IV Dilaudid this morning  Abdomen tender throughout  Likely that epidural catheter has migrated although insertion site looks normal   Epidural dose was increased yesterday for expanded coverage into the lower abdomen  Overnight was stopped due to hypotension and restarted this morning  Will increase the basal rate for now and have anesthesiologist evaluate epidural for appropriate function      PCEA use:  Opioid requirement previous 24 hours:  Dilaudid 1 5 mg IV    Meds/Allergies   all current active meds have been reviewed, current meds:   Current Facility-Administered Medications Medication Dose Route Frequency   • acetaminophen (TYLENOL) tablet 975 mg  975 mg Oral Q8H [address]   • amLODIPine (NORVASC) tablet 5 mg  5 mg Oral BID   • atorvastatin (LIPITOR) tablet 40 mg  40 mg Oral Daily With Dinner   • DULoxetine (CYMBALTA) delayed release capsule 60 mg  60 mg Oral Daily   • heparin (porcine) subcutaneous injection 5,000 Units  5,000 Units Subcutaneous Q8H [address]   • HYDROmorphone (DILAUDID) injection 0 5 mg  0 5 mg Intravenous Q2H PRN   • insulin glargine (LANTUS) subcutaneous injection 15 Units 0 15 mL  15 Units Subcutaneous HS   • insulin lispro (HumaLOG) 100 units/mL subcutaneous injection 1-5 Units  1-5 Units Subcutaneous TID AC   • insulin lispro (HumaLOG) 100 units/mL subcutaneous injection 1-5 Units  1-5 Units Subcutaneous HS   • insulin lispro (HumaLOG) 100 units/mL subcutaneous injection 5 Units  5 Units Subcutaneous TID With Meals   • labetalol (NORMODYNE) injection 10 mg  10 mg Intravenous Q4H PRN   • methocarbamol (ROBAXIN) tablet 750 mg  750 mg Oral Q8H [address]   • metoclopramide (REGLAN) tablet 5 mg  5 mg Oral BID   • metoprolol tartrate (LOPRESSOR) tablet 25 mg  25 mg Oral Q12H SHANNON   • ondansetron (ZOFRAN) injection 4 mg  4 mg Intravenous Q4H PRN   • pantoprazole (PROTONIX) injection 40 mg  40 mg Intravenous Q24H SHANNON   • piperacillin-tazobactam (ZOSYN) 3 375 g in sodium chloride 0 9 % 100 mL IVPB (EXTENDED-INFUSION)  3 375 g Intravenous Q8H   • ropivacaine 0 1% and fentaNYL 2 mcg/mL PCEA   Epidural Continuous    and PTA meds:   Prior to Admission Medications   Prescriptions Last Dose Informant Patient Reported? Taking?    Cholecalciferol (VITAMIN D3 PO) 10/31/2022 Self Yes Yes   Sig: Take 400 Units by mouth daily   Continuous Blood Gluc Sensor (FreeStyle [place] 14 Day Sensor) MISC  Self No No   Sig: Use 1 each every 14 (fourteen) days   DULoxetine (Cymbalta) 30 mg delayed release capsule 11/7/2022 at 0415 Self No Yes   Sig: Take 1 capsule (30 mg total) by mouth daily for 7 days, THEN 2 capsules (60 mg total) daily for 21 days  Insulin Pen Needle (B-D UF III MINI PEN NEEDLES) 31G X 5 MM MISC  Self No No   Sig: Inject under the skin daily   Januvia 100 MG tablet 11/6/2022 at 0900 Self No Yes   Sig: TAKE 1 TABLET BY MOUTH EVERY DAY   Lantus SoloStar 100 units/mL SOPN 11/5/2022  No Yes   Sig: INJECT 30 UNITS UNDER THE SKIN DAILY   Patient taking differently: Inject 35 Units under the skin daily   Multiple Vitamins-Minerals (multivitamin with minerals) tablet 10/31/2022 Self Yes Yes   Sig: Take 1 tablet by mouth daily   Needle, Disp, (HYPODERMIC NEEDLE) 23G X 1-1/2" MISC  Self No No   Sig: by Does not apply route once a week   Ostomy Supplies MISC  Self No No   Sig: Use in the morning   amLODIPine (NORVASC) 5 mg tablet 11/7/2022 at 0415 Self No Yes   Sig: TAKE 1 TABLET BY MOUTH TWICE A DAY   aspirin 81 MG tablet 10/31/2022 Self Yes Yes   Sig: Take 81 mg by mouth daily     atorvastatin (LIPITOR) 40 mg tablet 11/6/2022 at 2230 Self No Yes   Sig: TAKE 1 TABLET BY MOUTH EVERY DAY   glyBURIDE-metFORMIN (GLUCOVANCE) 5-500 MG per tablet 11/6/2022 at 0900 Self Yes Yes   Sig: Take 1 tablet by mouth daily with breakfast Taking 1 tablet in the morning    lisinopril (ZESTRIL) 40 mg tablet 11/6/2022 at 0900 Self No Yes   Sig: TAKE 1 TABLET BY MOUTH EVERY DAY   methocarbamol (ROBAXIN) 500 mg tablet 11/4/2022  No No   Sig: Take 1 tablet (500 mg total) by mouth 2 (two) times a day   metoclopramide (REGLAN) 10 mg tablet 11/7/2022 at 0415 Self No Yes   Sig: TAKE 1 TABLET BY MOUTH TWICE A DAY   ondansetron (ZOFRAN) 4 mg tablet More than a month at Unknown time Self Yes No   Sig: Take 4 mg by mouth every 6 (six) hours as needed   pantoprazole (PROTONIX) 40 mg tablet 11/7/2022 at 0415 Self No Yes   Sig: TAKE 1 TABLET BY MOUTH TWICE A DAY      Facility-Administered Medications: None       Allergies   Allergen Reactions   • Shellfish-Derived Products - Food Allergy Anaphylaxis   • Erythromycin GI Intolerance       Objective Temp:  [97 5 °F (36 4 °C)-99 3 °F (37 4 °C)] 98 2 °F (36 8 °C)  HR:  [] 90  Resp:  [15-38] 16  BP: ()/(58-96) 161/94  Arterial Line BP: ()/(38-72) 118/56    Physical Exam  Vitals and nursing note reviewed  Constitutional:       General: He is awake  He is not in acute distress  Appearance: He is obese  He is ill-appearing  He is not toxic-appearing or diaphoretic  Eyes:      Conjunctiva/sclera: Conjunctivae normal       Pupils: Pupils are equal, round, and reactive to light  Neck:      Vascular: No JVD  Trachea: No tracheal deviation  Cardiovascular:      Rate and Rhythm: Normal rate and regular rhythm  Pulmonary:      Effort: Pulmonary effort is normal  No respiratory distress  Abdominal:      Palpations: Abdomen is soft  Tenderness: There is generalized abdominal tenderness  There is guarding  There is no rebound  Skin:     General: Skin is warm and dry  Capillary Refill: Capillary refill takes less than 2 seconds  Neurological:      Mental Status: He is alert  He is confused  GCS: GCS eye subscore is 4  GCS verbal subscore is 4  GCS motor subscore is 6  Psychiatric:         Attention and Perception: Attention normal          Speech: Speech normal          Behavior: Behavior normal  Behavior is cooperative         Epidural: Site clean/dry/intact, no surrounding erythema/edema/induration, infusion functioning appropriately    Lab Results:   Results from last 7 days   Lab Units 11/09/22  1135   WBC Thousand/uL 19 22*   HEMOGLOBIN g/dL 9 1*   HEMATOCRIT % 26 7*   PLATELETS Thousands/uL 146*      Results from last 7 days   Lab Units 11/09/22  0423 11/08/22  0631 11/07/22  0835   POTASSIUM mmol/L 4 0   < >  --    CHLORIDE mmol/L 106   < >  --    CO2 mmol/L 23   < >  --    CO2, I-STAT mmol/L  --   --  26   BUN mg/dL 33*   < >  --    CREATININE mg/dL 2 13*   < >  --    CALCIUM mg/dL 8 5   < >  --    ALK PHOS U/L 192*   < >  --    ALT U/L 222*   < >  --    AST U/L 308*   < >  --    GLUCOSE, ISTAT mg/dl  --   --  169*    < > = values in this interval not displayed  Imaging Studies: I have personally reviewed pertinent reports  EKG, Pathology, and Other Studies: I have personally reviewed pertinent reports  Counseling / Coordination of Care  Total floor / unit time spent today 30 minutes  Greater than 50% of total time was spent with the patient and / or family counseling and / or coordination of care  A description of the counseling / coordination of care:  Patient interview, physical examination, review of medical record, review of imaging and laboratory data, development of pain management plan, discussion of pain management plan with patient and primary service  Please note that the APS provides consultative services regarding pain management only  With the exception of ketamine, peripheral nerve catheters, and epidural infusions (and except when indicated), final decisions regarding starting or changing doses of analgesic medications are at the discretion of the consulting service  Off hours consultation and/or medication management is generally not available      Margarita Lipoma  Acute Pain Service

## 2022-11-09 NOTE — PROGRESS NOTES
ICU Acceptance Note - Critical Care   Demetrio Zamora 61 y o  male MRN: [id number]  [facility] Encounter: [id number]        ----------------------------------------------------------------------------------------  HPI: Patient is a 61year old male with a significant past medical history of metastatic colon cancer, s/p ex lap, transverse colon resection, reversal of loop colostomy, peritoneal biopsy, and segment 3 liver resection on 11/7/2022     24hr events: Patient upgraded to critical care service secondary to acute encephalopathy, tachycardia, and tachypnea at approximately 6pm on 11/8/2022     ---------------------------------------------------------------------------------------  SUBJECTIVE  Unable to obtain secondary to altered mental status      Review of Systems   Unable to perform ROS: Mental status change     Review of systems was unable to be performed secondary to altered mental status  ---------------------------------------------------------------------------------------  Assessment and Plan:    Neuro:   • Diagnosis: Acute encephalopathy  o Suspect secondary to SIRS, undifferentiated (see below)  o Plan:   - Fluid resuscitate as below  - [facility] ordered, will follow  - Close neurological monitoring  - Neuro consulted, appreciate recs  • Diagnosis: Analgesia  o Plan:   - Scheduled tylenol, 975mg q8 hrs  - PCEA  - Robaxin, baclofen ordered  - APS consult if pain uncontrolled      CV:   • Diagnosis: SIRS criteria  o Tachycardia, tachypnea, worsening around 6pm 11/8/2022  o Currently undifferentiated, differential includes acute hemorrhage, PE, infectious etiology  - Plan  • Repeat labs now, will follow  • IV fluid boluses as needed  • Blood cultures, zosyn  • CTH, CTA c/a/p, will follow  • Dagnosis: Abnormal ECG  o Plan:   - New ECG findings on 11/8/2022, ectopic atrial tachycardia and LAFB  - Left shoulder pain believed to be MSK, no chest pain  - Troponins ordered and negative  - Cardiology consulted and believe precipitated by stress from surgery, no acute intervention needed  - Troponins repeated when patient became encephalopathic, will follow  - Continuous cardiac monitoring  - Lopressor 25mg q12  • Diagnosis: History of HTN  o Plan:   - Holding home amlodipine, lisinopril  • Diagnosis: History of HLD  o Plan:   - Home atorvastatin ordered      Pulm:  • Diagnosis: Tachypnea  o Plan:   - Worsening, suspect secondary to shock as below  - CTA c/a/p ordered, will follow  - Maintain SpO2 >92%, currently on NC      GI:   • Diagnosis: Metastatic colon cancer  o S/p ex lap, transverse colon resection, reversal of loop colostomy, peritoneal biopsy, and segment 3 liver resection on 11/7/2022  o Plan:   - Repeating imaging as above  - Protonix, holding while NPO  - NPO      :   • Diagnosis: No acute issues  o Plan:   - Strict I&Os      F/E/N:   • Plan:   o F- LR  o E- monitor and replete as needed, goal Mag >2, Phos >3, K >4  o N- NPO      Heme/Onc:   • Diagnosis: Concern for acute hemorrhage  o Plan:  - Pending CTA results   - Heparin for DVT ppx, will hold with concern for hemorrhage      Endo:   • Diagnosis: T2DM  o Plan:   - SSI algorithm 2, with algorithm 1 at bedtime, humalog 5U with meals, and lantus 15U at bedtime as per endo recs  - Goal -180      ID:   • Diagnosis: SIRS criteria  o Plan:   - As above, zosyn ordered and bcx sent, will follow    MSK/Skin:   • Diagnosis: Abdominal incision  o Plan:   - Local wound care  - Dressing changes as per surg onc    Patient appropriate for transfer out of the ICU today?: No  Disposition: Admit to Critical Care   Code Status: Level 1 - Full Code  ---------------------------------------------------------------------------------------  ICU CORE MEASURES    Prophylaxis   VTE Pharmacologic Prophylaxis: Pharmacologic VTE Prophylaxis contraindicated due to concern for hemorrhage  VTE Mechanical Prophylaxis: sequential compression device  Stress Ulcer Prophylaxis: Pantoprazole PO    ABCDE Protocol (if indicated)  Plan to perform spontaneous awakening trial today? Not applicable  Plan to perform spontaneous breathing trial today? Not applicable  Obvious barriers to extubation? Not applicable    Invasive Devices Review  Invasive Devices  Report    Central Venous Catheter Line  Duration           Port A Cath 03/10/22 Right Chest 243 days          Peripheral Intravenous Line  Duration           Peripheral IV 22 Left Hand 1 day    Peripheral IV 22 Right Hand 1 day          Epidural Line  Duration           Epidural Catheter 22 1 day          Drain  Duration           Colostomy Loop  days    Urethral Catheter Latex 16 Fr  1 day              Can any invasive devices be discontinued today? No  ---------------------------------------------------------------------------------------  OBJECTIVE    Vitals   Vitals:    22 1259 22 1520 22 1755 22 1926   BP: 130/79 145/94 154/96 147/90   Pulse: (!) 109 (!) 118 (!) 109 (!) 128   Resp: 18 18 (!) 38    Temp: 97 7 °F (36 5 °C)  98 1 °F (36 7 °C)    TempSrc:       SpO2: 94% 90% 95% 91%   Weight:       Height:         Temp (24hrs), Av 6 °F (36 4 °C), Min:97 3 °F (36 3 °C), Max:98 1 °F (36 7 °C)  Current: Temperature: 98 1 °F (36 7 °C)    Respiratory:  SpO2: SpO2: 95 %  Nasal Cannula O2 Flow Rate (L/min): 3 L/min    Invasive/non-invasive ventilation settings   Respiratory  Report   Lab Data (Last 4 hours)    None         O2/Vent Data (Last 4 hours)    None                Physical Exam  Vitals and nursing note reviewed  Constitutional:       Appearance: He is ill-appearing  HENT:      Head: Normocephalic and atraumatic  Right Ear: External ear normal       Left Ear: External ear normal       Nose: Nose normal       Mouth/Throat:      Mouth: Mucous membranes are moist    Eyes:      General: No scleral icterus  Right eye: No discharge  Left eye: No discharge  Extraocular Movements: Extraocular movements intact  Conjunctiva/sclera: Conjunctivae normal       Pupils: Pupils are equal, round, and reactive to light  Cardiovascular:      Rate and Rhythm: Regular rhythm  Tachycardia present  Heart sounds: Normal heart sounds  No murmur heard  No friction rub  No gallop  Pulmonary:      Effort: Tachypnea present  No respiratory distress  Breath sounds: Normal breath sounds  Abdominal:      General: Abdomen is flat  Bowel sounds are normal       Palpations: Abdomen is soft  Tenderness: There is abdominal tenderness (Predominately RLQ)  There is no guarding or rebound  Comments: Incision clean, dry, intact, well healing   Musculoskeletal:      Cervical back: Normal range of motion  Right lower leg: No edema  Left lower leg: No edema  Skin:     General: Skin is warm and dry  Neurological:      General: No focal deficit present  Comments: Patient AAOx3  Follows commands but slow to follow and appears confused at times  No FNDs but patient does have difficulty raising b/l UE               Laboratory and Diagnostics:  Results from last 7 days   Lab Units 11/08/22 1913 11/08/22  0631 11/07/22  0835   WBC Thousand/uL 10 68* 21 96*  --    HEMOGLOBIN g/dL 8 6* 10 1*  --    I STAT HEMOGLOBIN g/dl  --   --  11 6*   HEMATOCRIT % 26 6* 29 7*  --    HEMATOCRIT, ISTAT %  --   --  34*   PLATELETS Thousands/uL 225 327  --    NEUTROS PCT %  --  88*  --    BANDS PCT % 4  --   --    MONOS PCT %  --  7  --    MONO PCT % 0*  --   --      Results from last 7 days   Lab Units 11/08/22 1913 11/08/22  0631 11/07/22  0835   SODIUM mmol/L 135 137  --    POTASSIUM mmol/L 4 5 4 9  --    CHLORIDE mmol/L 106 108  --    CO2 mmol/L 22 21  --    CO2, I-STAT mmol/L  --   --  26   ANION GAP mmol/L 7 8  --    BUN mg/dL 31* 24  --    CREATININE mg/dL 2 28* 1 73*  --    CALCIUM mg/dL 8 1* 8 2*  --    GLUCOSE RANDOM mg/dL 217* 213*  --    ALT U/L 263*  --   -- AST U/L 379*  --   --    ALK PHOS U/L 179*  --   --    ALBUMIN g/dL 2 0*  --   --    TOTAL BILIRUBIN mg/dL 0 69  --   --      Results from last 7 days   Lab Units 11/08/22 1913 11/08/22  0631   MAGNESIUM mg/dL 2 5 1 5*   PHOSPHORUS mg/dL 3 7  --                    ABG:    VBG:  Results from last 7 days   Lab Units 11/08/22  1922   PH RUMA  7 386   PCO2 RUMA mm Hg 34 4*   PO2 RUMA mm Hg 36 6   HCO3 RUMA mmol/L 20 2*   BASE EXC RUMA mmol/L -4 3           Micro        EKG: as above  Imaging:  I have personally reviewed pertinent reports  Intake and Output  I/O       11/07 0701 11/08 0700 11/08 0701 11/09 0700    P  O  120 360    I V  (mL/kg) 2584 2 (23 1) 2355 (21)    IV Piggyback 400     Total Intake(mL/kg) 3104 2 (27 7) 2715 (24 2)    Urine (mL/kg/hr) 620 (0 2) 350 (0 2)    Blood 100     Total Output 720 350    Net +2384 2 +2365                Height and Weights   Height: 5' 8" (172 7 cm)  IBW (Ideal Body Weight): 68 4 kg  Body mass index is 37 56 kg/m²  Weight (last 2 days)     Date/Time Weight    11/07/22 0532 112 (247)            Nutrition       Diet Orders   (From admission, onward)             Start     Ordered    11/08/22 1942  Diet NPO  Diet effective now        References:    Nutrtion Support Algorithm Enteral vs  Parenteral   Question Answer Comment   Diet Type NPO    RD to adjust diet per protocol?  No        11/08/22 1941                  Active Medications  Scheduled Meds:  Current Facility-Administered Medications   Medication Dose Route Frequency Provider Last Rate   • acetaminophen  975 mg Oral [facility] Shon Terry MD     • atorvastatin  40 mg Oral Daily With Tiff Estrada MD     • baclofen  10 mg Oral TID Stephany Priest MD     • cefazolin  2,000 mg Intravenous Once Gil Patel MD     • heparin (porcine)  5,000 Units Subcutaneous [facility] Gil Patel MD     • HYDROmorphone  0 5 mg Intravenous Q2H PRN Marybel Werner MD     • insulin glargine  15 Units Subcutaneous HS Merlyn Sandor Baker MD     • insulin lispro  1-5 Units Subcutaneous TID AC Merlyn Baker MD     • insulin lispro  1-5 Units Subcutaneous HS Merlyn Baker MD     • insulin lispro  5 Units Subcutaneous TID With Meals Merlyn Baker MD     • lactated ringers  100 mL/hr Intravenous Continuous Melony Alanis  mL/hr (11/08/22 1530)   • lidocaine  1 patch Topical Daily Laquita Lan PA-C     • methocarbamol  750 mg Oral [facility] Shon Payan MD     • metoclopramide  10 mg Oral BID Reggie Daniel MD     • metoprolol tartrate  25 mg Oral Q12H Trg Smita Mathews MD     • ondansetron  4 mg Intravenous Q4H PRN Reggie Daniel MD     • pantoprazole  40 mg Oral Early Morning Reggie Daniel MD     • ropivacaine 0 1% and fentaNYL 2 mcg/mL   Epidural Continuous Shon Payan MD       Continuous Infusions:  lactated ringers, 100 mL/hr, Last Rate: 100 mL/hr (11/08/22 1530)  ropivacaine 0 1% and fentaNYL 2 mcg/mL,       PRN Meds:   HYDROmorphone, 0 5 mg, Q2H PRN  ondansetron, 4 mg, Q4H PRN        Allergies   Allergies   Allergen Reactions   • Shellfish-Derived Products - Food Allergy Anaphylaxis   • Erythromycin GI Intolerance     ---------------------------------------------------------------------------------------  Advance Directive and Living Will:      Power of :    POLST:    ---------------------------------------------------------------------------------------  Care Time Delivered:   No Critical Care time spent     DO Melodie      Portions of the record may have been created with voice recognition software  Occasional wrong word or "sound a like" substitutions may have occurred due to the inherent limitations of voice recognition software    Read the chart carefully and recognize, using context, where substitutions have occurred

## 2022-11-09 NOTE — PROGRESS NOTES
Daily Progress Note - Critical Care   Catracho Ledesma 61 y o  male MRN: [id number]  Unit/Bed#: [facility] 02 Encounter: [id number]        ----------------------------------------------------------------------------------------  HPI/24hr events: Patient is a 61year old male with a significant past medical history of metastatic colon cancer, s/p ex lap, transverse colon resection, reversal of loop colostomy, peritoneal biopsy, and segment 3 liver resection on 11/7/2022  Overnight patient found to be hypotensive, tachycardic, and tachypneic  A CTH and CTA CAP obtained which demonstrated LUQ hematoma w/ no active extravasation  He was transferred to ICU for further resusication  Patient was transfused 2u prbc and 1u ffp which he responded appropriately     ---------------------------------------------------------------------------------------  SUBJECTIVE  Patient is confused, complaining of significant abdominal pain this morning  No fevers/chills    Review of Systems   Unable to perform ROS: Mental status change   Gastrointestinal: Positive for abdominal pain       Review of systems was reviewed and negative unless stated above in HPI/24-hour events   ---------------------------------------------------------------------------------------  Assessment and Plan:    Neuro:   • Diagnosis: Acute metabolic encephalopathy  o Likely 2/2 to hemorrhage, improved  o Plan:   - CTH negative  - Delirium precautions, maintain sleep-wake cycle  - Neurology c/s, appreciate recs  • Diagnosis: Pain  o Plan:   - Multimodal analgesia, scheduled tylenol and robaxin, PCEA  - Appreciate APS assitance      CV:   • Diagnosis:Hemorrhagic shock  o CTACAP-8cm LUQ hematoma, no active extravasation  o Received 2uprbc, 1 ffp  o Plan:   - Continue to monitor signs/sx of bleeding  - Trend hgb  - Transfuse for hgb <7  - Maintain MAP>65  • Diagnosis: Paroxysmal atrial tachycardia  o Plan:   - Trops negative  - Cardiology evaluation, appreciate recs-presumed 2/2 stress from surgery  - Metoprolol 25mg BID  - Follow up ECHO  • Diagnosis: HTN  o Plan:  - Continue to hold home lisinopril and amlodopine   - Labetalol PRN SBP >160  • Diagnosis: HLD  o Plan: continue lipitor      Pulm:  • Diagnosis: Acute hypoxic respiratory failure  o Likely 2/2 volume overload   CT yday w/ pulmonary edema and b/l pleural effusions  o Plan:   - CXR this AM  - Maintain spO2>92%  - Wean oxygen as tolerated  - Aggressive pulmonary toilet, IS 10x per hour      GI:   • Diagnosis: Metastatic colon cancer  o S/p ex-lap, transverse colon resection, loop colostomy reversal, peritoneal bx, and segment 3 liver resection on 11/7  o Plan:   - Plan per surgical oncology, appreciate recommendations  - Serial abdominal exams  • Diagnosis: Transaminitis  o Plan: Secondary to liver resection  - Continue to monitor LFTs  • Diagnosis :Da Gin  o Plan:  - Continue protonix      :   • Diagnosis: Acute kidney injury  o Plan:   - Secondary to prerenal etiology likely in the setting of hemorrhage  - Monitor BUN/Cr  - Strict I/O  - Continue rivero  - Net +8L this admission, +5 7L in 24 hours  - 20 IV lasix this AM      F/E/N:   • [email or web address]  • E:Replace electrolytes prn K>4,Mg>2,P>3  • N: NPO      Heme/Onc:   • Diagnosis: Acute blood anemia  o Plan:   - Continue to monitor hgb  - Monitor signs/sx of bleeding  - Transfuse hgb <7      Endo:   • Diagnosis: Type 2 DM  o Plan:   - Endocrinology on board  - BS goal 140-180      ID:   • Diagnosis: SIRS  o Plan:   - Started on zosyn  - Blood cx sent and pending      MSK/Skin:   • Diagnosis: Surgical wound  o Plan:   - Per surg onc  • PT/OT, OOB and ambulate    Patient appropriate for transfer out of the ICU today?: No  Disposition: Continue Critical Care   Code Status: Level 1 - Full Code  ---------------------------------------------------------------------------------------  ICU CORE MEASURES    Prophylaxis   VTE Pharmacologic Prophylaxis: Heparin  VTE Mechanical Prophylaxis: sequential compression device  Stress Ulcer Prophylaxis: Pantoprazole IV       Invasive Devices Review  Invasive Devices  Report    Central Venous Catheter Line  Duration           Port A Cath 03/10/22 Right Chest 243 days          Peripheral Intravenous Line  Duration           Peripheral IV 22 Left Hand 1 day    Peripheral IV 22 Right Hand 1 day    Peripheral IV 22 Left;Proximal;Ventral (anterior) Forearm <1 day    Peripheral IV 22 Proximal;Right;Ventral (anterior) Forearm <1 day          Epidural Line  Duration           Epidural Catheter 22 1 day          Arterial Line  Duration           Arterial Line 22 Radial <1 day          Drain  Duration           Urethral Catheter Latex 16 Fr  1 day              Can any invasive devices be discontinued today? No  ---------------------------------------------------------------------------------------  OBJECTIVE    Vitals   Vitals:    22 0300 22 0400 22 0500 22 0600   BP: 125/76 149/90 169/96 169/92   Pulse: (!) 114 (!) 108 (!) 110 (!) 110   Resp: 17 20 (!) 25 18   Temp:       TempSrc:       SpO2: 93% 93% 93% 94%   Weight:       Height:         Temp (24hrs), Av 4 °F (36 9 °C), Min:97 3 °F (36 3 °C), Max:99 3 °F (37 4 °C)  Current: Temperature: 98 8 °F (37 1 °C)    Respiratory:  SpO2: SpO2: 95 %  Nasal Cannula O2 Flow Rate (L/min): 12 L/min    Invasive/non-invasive ventilation settings   Respiratory  Report   Lab Data (Last 4 hours)       0423            pH, Arterial       7 422             pCO2, Arterial       35 3             pO2, Arterial       62 8             HCO3, Arterial       22 5             Base Excess, Arterial       -1 6                  O2/Vent Data     None                Physical Exam  Constitutional:       General: He is in acute distress  Appearance: He is obese  He is not ill-appearing  HENT:      Head: Normocephalic and atraumatic        Right Ear: External ear normal  Left Ear: External ear normal       Nose: Nose normal       Mouth/Throat:      Mouth: Mucous membranes are moist    Eyes:      Conjunctiva/sclera: Conjunctivae normal    Cardiovascular:      Rate and Rhythm: Tachycardia present  Pulses: Normal pulses  Pulmonary:      Comments: Increased work of breathing  On 10L NC  Abdominal:      General: There is distension  Palpations: Abdomen is soft  Tenderness: There is abdominal tenderness (LUQ, RUQ, perincisional)  There is no guarding or rebound  Comments: Incisions is c/d/i   Genitourinary:     Comments: Negro present  Musculoskeletal:      Right lower leg: Edema present  Left lower leg: Edema present  Skin:     General: Skin is warm  Capillary Refill: Capillary refill takes less than 2 seconds  Neurological:      General: No focal deficit present  Mental Status: He is alert  He is disoriented  Sensory: No sensory deficit  Comments:  Follows commands, moves all 4 extremities, confused    GCS 14             Laboratory and Diagnostics:  Results from last 7 days   Lab Units 11/09/22 0423 11/09/22 0207 11/08/22 2230 11/08/22 1913 11/08/22  0631 11/07/22  0835   WBC Thousand/uL 20 03*  --   --  10 68* 21 96*  --    HEMOGLOBIN g/dL 9 2* 8 8* 7 5* 8 6* 10 1*  --    I STAT HEMOGLOBIN g/dl  --   --   --   --   --  11 6*   HEMATOCRIT % 26 6* 25 6* 22 1* 26 6* 29 7*  --    HEMATOCRIT, ISTAT %  --   --   --   --   --  34*   PLATELETS Thousands/uL 149  --   --  225 327  --    NEUTROS PCT %  --   --   --   --  88*  --    BANDS PCT %  --   --   --  4  --   --    MONOS PCT %  --   --   --   --  7  --    MONO PCT %  --   --   --  0*  --   --      Results from last 7 days   Lab Units 11/09/22 0423 11/08/22 1913 11/08/22  0631 11/07/22  0835   SODIUM mmol/L 137 135 137  --    POTASSIUM mmol/L 4 0 4 5 4 9  --    CHLORIDE mmol/L 106 106 108  --    CO2 mmol/L 23 22 21  --    CO2, I-STAT mmol/L  --   --   --  26   ANION GAP mmol/L 8 7 8  --    BUN mg/dL 33* 31* 24  --    CREATININE mg/dL 2 13* 2 28* 1 73*  --    CALCIUM mg/dL 8 5 8 1* 8 2*  --    GLUCOSE RANDOM mg/dL 199* 217* 213*  --    ALT U/L 222* 263*  --   --    AST U/L 308* 379*  --   --    ALK PHOS U/L 192* 179*  --   --    ALBUMIN g/dL 2 1* 2 0*  --   --    TOTAL BILIRUBIN mg/dL 1 17* 0 69  --   --      Results from last 7 days   Lab Units 11/09/22 0423 11/08/22  1913 11/08/22  0631   MAGNESIUM mg/dL 2 2 2 5 1 5*   PHOSPHORUS mg/dL 3 4 3 7  --                Results from last 7 days   Lab Units 11/09/22 0423 11/09/22  0207 11/08/22  2230 11/08/22  1918   LACTIC ACID mmol/L 2 1* 2 6* 4 1* 3 7*     ABG:  Results from last 7 days   Lab Units 11/09/22 0423   PH ART  7 422   PCO2 ART mm Hg 35 3*   PO2 ART mm Hg 62 8*   HCO3 ART mmol/L 22 5   BASE EXC ART mmol/L -1 6   ABG SOURCE  Line, Arterial     VBG:  Results from last 7 days   Lab Units 11/09/22 0423 11/08/22  2330 11/08/22  2247   PH RUMA   --   --  7 429*   PCO2 RUMA mm Hg  --   --  33 8*   PO2 RUMA mm Hg  --   --  49 3*   HCO3 RUMA mmol/L  --   --  21 9*   BASE EXC RUMA mmol/L  --   --  -2 1   ABG SOURCE  Line, Arterial   < >  --     < > = values in this interval not displayed  Micro        EKG:   Imaging:  I have personally reviewed pertinent films in PACS    Intake and Output  I/O       11/07 0701 11/08 0700 11/08 0701 11/09 0700    P  O  120 360    I V  (mL/kg) 2584 2 (23 1) 4286 2 (38 3)    Blood  1000    IV Piggyback 400 1450    Total Intake(mL/kg) 3104 2 (27 7) 7096 2 (63 4)    Urine (mL/kg/hr) 620 (0 2) 1300 (0 5)    Blood 100     Total Output 720 1300    Net +2384 2 +5796 2                Height and Weights   Height: 5' 8" (172 7 cm)  IBW (Ideal Body Weight): 68 4 kg  Body mass index is 37 56 kg/m²    Weight (last 2 days)     Date/Time Weight    11/07/22 0532 112 (247)            Nutrition       Diet Orders   (From admission, onward)             Start     Ordered    11/08/22 1942  Diet NPO  Diet effective now References:    Nutrtion Support Algorithm Enteral vs  Parenteral   Question Answer Comment   Diet Type NPO    RD to adjust diet per protocol?  No        11/08/22 1941                  Active Medications  Scheduled Meds:  Current Facility-Administered Medications   Medication Dose Route Frequency Provider Last Rate   • acetaminophen  975 mg Oral [facility] Shon Mann MD     • atorvastatin  40 mg Oral Daily With Laury Peters MD     • baclofen  10 mg Oral TID Hansel Zavala MD     • cefazolin  2,000 mg Intravenous Once Aly Elliott MD     • HYDROmorphone  0 5 mg Intravenous Q2H PRN Oneida Chávez MD     • insulin glargine  15 Units Subcutaneous HS Mark Alamo MD     • insulin lispro  1-5 Units Subcutaneous TID AC Merlyn Rosa MD     • insulin lispro  1-5 Units Subcutaneous HS Merlyn Rosa MD     • insulin lispro  5 Units Subcutaneous TID With Meals Merlyn Rosa MD     • lactated ringers  1,000 mL Intravenous Once Mg Echeverria MD     • lactated ringers  100 mL/hr Intravenous Continuous Erasmo Heard DO     • methocarbamol  750 mg Oral [facility] Shon Mann MD     • metoclopramide  10 mg Oral BID Aly Elliott MD     • metoprolol tartrate  25 mg Oral Q12H Trg Smita Mathews MD     • ondansetron  4 mg Intravenous Q4H PRN Aly Elliott MD     • pantoprazole  40 mg Oral Early Morning Aly Elliott MD     • piperacillin-tazobactam  3 375 g Intravenous [address] Jami, DO Stopped (11/09/22 0428)   • ropivacaine 0 1% and fentaNYL 2 mcg/mL   Epidural Continuous Megan Calderón MD       Continuous Infusions:  lactated ringers, 100 mL/hr  ropivacaine 0 1% and fentaNYL 2 mcg/mL,       PRN Meds:   HYDROmorphone, 0 5 mg, Q2H PRN  ondansetron, 4 mg, Q4H PRN        Allergies   Allergies   Allergen Reactions   • Shellfish-Derived Products - Food Allergy Anaphylaxis   • Erythromycin GI Intolerance ---------------------------------------------------------------------------------------  Advance Directive and Living Will:      Power of :    POLST:    ---------------------------------------------------------------------------------------  Care Time Delivered:       King Martina DO      Portions of the record may have been created with voice recognition software  Occasional wrong word or "sound a like" substitutions may have occurred due to the inherent limitations of voice recognition software    Read the chart carefully and recognize, using context, where substitutions have occurred

## 2022-11-09 NOTE — PROGRESS NOTES
Progress Note - Surgical Oncology  Gene Lynn 61 y o  male MRN: [id number]  Unit/Bed#: MICU 02 Encounter: [id number]    Assessment:  60 yo male with PMHx of DM, GERD, HLD, HTN, CKD3, and metastatic colon cancer, s/p 11/7 ex lap, transverse colon resection, reversal of loop colostomy, peritoneal bx, segment 3 liver resection by Dr Jillian Ngo  11/8 upgraded to ICU for acute encephalopathy, tachycardia, tachypnea, lactic acidosis: CT head negative,CT AP with LUQ hemorrheage (no active extrav)  11/8:  2 units PRBC, 1 unit of plasma    Map improved overnight, now map greater than 80  Continued to be tachycardic with heart rate in the 110s  Respiratory rate 20s on 10 L nasal cannula    6 9 L in yesterday total including blood and bolus  1 3 L urine    Hemoglobin 9 2 from 8 8 from 7 5  White count 20 for 10 from 20  Creatinine 2 1 from 2 2  Lactic acid 2 1 from 2 6 from 4 1  ABG from 4:00 a m :  7 4, 35, 62, -1 6    Plan:  NPO  APS recs regarding epidural appreciated, currently off given hypotensive last night  LR @100  Negro  SQH for DVT prophylaxis  PCEA in place  Wean NC O2  Encourage OOB   Encourage ISS use  Endo recs appreciated  Continue to monitor blood glucose and adjust  Cardiology recs appreciated        Subjective/Objective     Subjective:   See quick notes for overnight events  Current weight says he is in some pain  No nausea no vomiting  Follows commands  No bowel function  No flatus or bowel movements  Objective:     Blood pressure 103/62, pulse (!) 132, temperature 99 1 °F (37 3 °C), temperature source Oral, resp  rate (!) 27, height 5' 8" (1 727 m), weight 112 kg (247 lb), SpO2 95 %  ,Body mass index is 37 56 kg/m²        Intake/Output Summary (Last 24 hours) at 11/8/2022 2231  Last data filed at 11/8/2022 2045  Gross per 24 hour   Intake 3078 77 ml   Output 500 ml   Net 2578 77 ml       Invasive Devices  Report    Central Venous Catheter Line  Duration           Port A Cath 03/10/22 Right Chest 243 days          Peripheral Intravenous Line  Duration           Peripheral IV 11/07/22 Left Hand 1 day    Peripheral IV 11/07/22 Right Hand 1 day          Epidural Line  Duration           Epidural Catheter 11/07/22 1 day          Drain  Duration           Colostomy Loop  days    Urethral Catheter Latex 16 Fr  1 day                Physical Exam:   Gen: NAD, Comfortable  Neuro: A&O, No focal deficits  Head: Normal Cephalic, Atraumatic  Eye: EOMI, PERRLA, No scleral icterus  Neck: Supple, No JVD, Midline trachea  CV: RRR, Cap refill <2 sec  Pulm: Normal work of breathing, no respiratory distress  Abd: Soft, Non-Distended, diffusely tender  Ext: No edema, Non-tender  Skin: warm, dry, intact

## 2022-11-10 ENCOUNTER — APPOINTMENT (OUTPATIENT)
Dept: RADIOLOGY | Facility: HOSPITAL | Age: 57
End: 2022-11-10

## 2022-11-10 LAB
2HR DELTA HS TROPONIN: 33 NG/L
4HR DELTA HS TROPONIN: 33 NG/L
ALBUMIN SERPL BCP-MCNC: 1.7 G/DL (ref 3.5–5)
ALP SERPL-CCNC: 247 U/L (ref 46–116)
ALT SERPL W P-5'-P-CCNC: 137 U/L (ref 12–78)
ANION GAP SERPL CALCULATED.3IONS-SCNC: 8 MMOL/L (ref 4–13)
ANISOCYTOSIS BLD QL SMEAR: PRESENT
AST SERPL W P-5'-P-CCNC: 127 U/L (ref 5–45)
ATRIAL RATE: 115 BPM
ATRIAL RATE: 117 BPM
BASE EXCESS BLDA CALC-SCNC: -1.7 MMOL/L
BASE EXCESS BLDA CALC-SCNC: -3.5 MMOL/L
BASE EXCESS BLDA CALC-SCNC: -3.7 MMOL/L
BASOPHILS # BLD MANUAL: 0 THOUSAND/UL (ref 0–0.1)
BASOPHILS NFR MAR MANUAL: 0 % (ref 0–1)
BILIRUB SERPL-MCNC: 0.93 MG/DL (ref 0.2–1)
BODY TEMPERATURE: 97.5 DEGREES FEHRENHEIT
BODY TEMPERATURE: 98.2 DEGREES FEHRENHEIT
BODY TEMPERATURE: 99.5 DEGREES FEHRENHEIT
BUN SERPL-MCNC: 44 MG/DL (ref 5–25)
CA-I BLD-SCNC: 1.04 MMOL/L (ref 1.12–1.32)
CALCIUM ALBUM COR SERPL-MCNC: 9.9 MG/DL (ref 8.3–10.1)
CALCIUM SERPL-MCNC: 8.1 MG/DL (ref 8.3–10.1)
CARDIAC TROPONIN I PNL SERPL HS: 111 NG/L
CARDIAC TROPONIN I PNL SERPL HS: 111 NG/L
CHLORIDE SERPL-SCNC: 109 MMOL/L (ref 96–108)
CO2 SERPL-SCNC: 23 MMOL/L (ref 21–32)
CREAT SERPL-MCNC: 2.21 MG/DL (ref 0.6–1.3)
EOSINOPHIL # BLD MANUAL: 0.03 THOUSAND/UL (ref 0–0.4)
EOSINOPHIL NFR BLD MANUAL: 2 % (ref 0–6)
ERYTHROCYTE [DISTWIDTH] IN BLOOD BY AUTOMATED COUNT: 13.4 % (ref 11.6–15.1)
ERYTHROCYTE [DISTWIDTH] IN BLOOD BY AUTOMATED COUNT: 13.5 % (ref 11.6–15.1)
ERYTHROCYTE [DISTWIDTH] IN BLOOD BY AUTOMATED COUNT: 13.6 % (ref 11.6–15.1)
GFR SERPL CREATININE-BSD FRML MDRD: 31 ML/MIN/1.73SQ M
GLUCOSE SERPL-MCNC: 136 MG/DL (ref 65–140)
GLUCOSE SERPL-MCNC: 144 MG/DL (ref 65–140)
GLUCOSE SERPL-MCNC: 155 MG/DL (ref 65–140)
GLUCOSE SERPL-MCNC: 166 MG/DL (ref 65–140)
GLUCOSE SERPL-MCNC: 171 MG/DL (ref 65–140)
GLUCOSE SERPL-MCNC: 175 MG/DL (ref 65–140)
HCO3 BLDA-SCNC: 21 MMOL/L (ref 22–28)
HCO3 BLDA-SCNC: 21.6 MMOL/L (ref 22–28)
HCO3 BLDA-SCNC: 23.3 MMOL/L (ref 22–28)
HCT VFR BLD AUTO: 24.4 % (ref 36.5–49.3)
HCT VFR BLD AUTO: 24.7 % (ref 36.5–49.3)
HCT VFR BLD AUTO: 25 % (ref 36.5–49.3)
HCT VFR BLD AUTO: 29.6 % (ref 36.5–49.3)
HGB BLD-MCNC: 8.3 G/DL (ref 12–17)
HGB BLD-MCNC: 8.4 G/DL (ref 12–17)
HGB BLD-MCNC: 8.5 G/DL (ref 12–17)
HGB BLD-MCNC: 9.7 G/DL (ref 12–17)
HOROWITZ INDEX BLDA+IHG-RTO: 100 MM[HG]
HOROWITZ INDEX BLDA+IHG-RTO: 100 MM[HG]
HOROWITZ INDEX BLDA+IHG-RTO: 40 MM[HG]
LACTATE SERPL-SCNC: 2 MMOL/L (ref 0.5–2)
LACTATE SERPL-SCNC: 2.4 MMOL/L (ref 0.5–2)
LACTATE SERPL-SCNC: 2.9 MMOL/L (ref 0.5–2)
LYMPHOCYTES # BLD AUTO: 0.32 THOUSAND/UL (ref 0.6–4.47)
LYMPHOCYTES # BLD AUTO: 23 % (ref 14–44)
MAGNESIUM SERPL-MCNC: 2.5 MG/DL (ref 1.6–2.6)
MCH RBC QN AUTO: 30.2 PG (ref 26.8–34.3)
MCH RBC QN AUTO: 31 PG (ref 26.8–34.3)
MCH RBC QN AUTO: 31.2 PG (ref 26.8–34.3)
MCHC RBC AUTO-ENTMCNC: 32.8 G/DL (ref 31.4–37.4)
MCHC RBC AUTO-ENTMCNC: 34 G/DL (ref 31.4–37.4)
MCHC RBC AUTO-ENTMCNC: 34 G/DL (ref 31.4–37.4)
MCV RBC AUTO: 91 FL (ref 82–98)
MCV RBC AUTO: 92 FL (ref 82–98)
MCV RBC AUTO: 92 FL (ref 82–98)
METAMYELOCYTES NFR BLD MANUAL: 1 % (ref 0–1)
MONOCYTES # BLD AUTO: 0 THOUSAND/UL (ref 0–1.22)
MONOCYTES NFR BLD: 0 % (ref 4–12)
NEUTROPHILS # BLD MANUAL: 0.98 THOUSAND/UL (ref 1.85–7.62)
NEUTS BAND NFR BLD MANUAL: 6 % (ref 0–8)
NEUTS SEG NFR BLD AUTO: 64 % (ref 43–75)
O2 CT BLDA-SCNC: 12 ML/DL (ref 16–23)
O2 CT BLDA-SCNC: 12.4 ML/DL (ref 16–23)
O2 CT BLDA-SCNC: 12.7 ML/DL (ref 16–23)
OXYHGB MFR BLDA: 95.7 % (ref 94–97)
OXYHGB MFR BLDA: 95.8 % (ref 94–97)
OXYHGB MFR BLDA: 98 % (ref 94–97)
P AXIS: 39 DEGREES
P AXIS: 43 DEGREES
PCO2 BLDA: 35.2 MM HG (ref 36–44)
PCO2 BLDA: 40 MM HG (ref 36–44)
PCO2 BLDA: 40.4 MM HG (ref 36–44)
PCO2 TEMP ADJ BLDA: 34.3 MM HG (ref 36–44)
PCO2 TEMP ADJ BLDA: 40 MM HG (ref 36–44)
PEEP RESPIRATORY: 8 CM[H2O]
PH BLD: 7.38 [PH] (ref 7.35–7.45)
PH BLD: 7.4 [PH] (ref 7.35–7.45)
PH BLDA: 7.35 [PH] (ref 7.35–7.45)
PH BLDA: 7.38 [PH] (ref 7.35–7.45)
PH BLDA: 7.39 [PH] (ref 7.35–7.45)
PHOSPHATE SERPL-MCNC: 5 MG/DL (ref 2.7–4.5)
PLATELET # BLD AUTO: 133 THOUSANDS/UL (ref 149–390)
PLATELET # BLD AUTO: 139 THOUSANDS/UL (ref 149–390)
PLATELET # BLD AUTO: 199 THOUSANDS/UL (ref 149–390)
PLATELET BLD QL SMEAR: ADEQUATE
PMV BLD AUTO: 10.3 FL (ref 8.9–12.7)
PMV BLD AUTO: 10.7 FL (ref 8.9–12.7)
PMV BLD AUTO: 10.8 FL (ref 8.9–12.7)
PO2 BLD: 154.3 MM HG (ref 75–129)
PO2 BLD: 93.2 MM HG (ref 75–129)
PO2 BLDA: 103.8 MM HG (ref 75–129)
PO2 BLDA: 155.5 MM HG (ref 75–129)
PO2 BLDA: 96.7 MM HG (ref 75–129)
POIKILOCYTOSIS BLD QL SMEAR: PRESENT
POTASSIUM SERPL-SCNC: 4.3 MMOL/L (ref 3.5–5.3)
PR INTERVAL: 154 MS
PR INTERVAL: 158 MS
PROT SERPL-MCNC: 5.3 G/DL (ref 6.4–8.4)
QRS AXIS: 18 DEGREES
QRS AXIS: 27 DEGREES
QRSD INTERVAL: 146 MS
QRSD INTERVAL: 146 MS
QT INTERVAL: 342 MS
QT INTERVAL: 342 MS
QTC INTERVAL: 473 MS
QTC INTERVAL: 478 MS
RBC # BLD AUTO: 2.68 MILLION/UL (ref 3.88–5.62)
RBC # BLD AUTO: 2.69 MILLION/UL (ref 3.88–5.62)
RBC # BLD AUTO: 3.21 MILLION/UL (ref 3.88–5.62)
SODIUM SERPL-SCNC: 140 MMOL/L (ref 135–147)
SPECIMEN SOURCE: ABNORMAL
T WAVE AXIS: 31 DEGREES
T WAVE AXIS: 32 DEGREES
VARIANT LYMPHS # BLD AUTO: 4 %
VENT AC: 16
VENT- AC: AC
VENTRICULAR RATE: 115 BPM
VENTRICULAR RATE: 117 BPM
VT SETTING VENT: 500 ML
WBC # BLD AUTO: 1.4 THOUSAND/UL (ref 4.31–10.16)
WBC # BLD AUTO: 14.02 THOUSAND/UL (ref 4.31–10.16)
WBC # BLD AUTO: 9.18 THOUSAND/UL (ref 4.31–10.16)

## 2022-11-10 RX ORDER — FENTANYL CITRATE 50 UG/ML
100 INJECTION, SOLUTION INTRAMUSCULAR; INTRAVENOUS ONCE
Status: COMPLETED | OUTPATIENT
Start: 2022-11-10 | End: 2022-11-09

## 2022-11-10 RX ORDER — ACETAMINOPHEN 325 MG/1
650 TABLET ORAL EVERY 4 HOURS PRN
Status: DISCONTINUED | OUTPATIENT
Start: 2022-11-10 | End: 2022-11-10

## 2022-11-10 RX ORDER — INSULIN LISPRO 100 [IU]/ML
2-12 INJECTION, SOLUTION INTRAVENOUS; SUBCUTANEOUS EVERY 6 HOURS SCHEDULED
Status: DISCONTINUED | OUTPATIENT
Start: 2022-11-10 | End: 2022-11-12

## 2022-11-10 RX ORDER — GLYCOPYRROLATE 0.2 MG/ML
0.2 INJECTION INTRAMUSCULAR; INTRAVENOUS EVERY 4 HOURS PRN
Status: DISCONTINUED | OUTPATIENT
Start: 2022-11-10 | End: 2022-11-14

## 2022-11-10 RX ORDER — ALBUMIN (HUMAN) 12.5 G/50ML
50 SOLUTION INTRAVENOUS ONCE
Status: COMPLETED | OUTPATIENT
Start: 2022-11-10 | End: 2022-11-10

## 2022-11-10 RX ORDER — HALOPERIDOL 5 MG/ML
2 INJECTION INTRAMUSCULAR
Status: DISCONTINUED | OUTPATIENT
Start: 2022-11-10 | End: 2022-11-14

## 2022-11-10 RX ORDER — ACETAMINOPHEN 650 MG/1
650 SUPPOSITORY RECTAL EVERY 4 HOURS PRN
Status: DISCONTINUED | OUTPATIENT
Start: 2022-11-10 | End: 2022-11-12

## 2022-11-10 RX ORDER — CALCIUM GLUCONATE 20 MG/ML
2 INJECTION, SOLUTION INTRAVENOUS ONCE
Status: COMPLETED | OUTPATIENT
Start: 2022-11-10 | End: 2022-11-10

## 2022-11-10 RX ORDER — LORAZEPAM 2 MG/ML
1 INJECTION INTRAMUSCULAR
Status: DISCONTINUED | OUTPATIENT
Start: 2022-11-10 | End: 2022-11-14

## 2022-11-10 RX ADMIN — PROPOFOL 40 MCG/KG/MIN: 10 INJECTION, EMULSION INTRAVENOUS at 00:10

## 2022-11-10 RX ADMIN — CALCIUM GLUCONATE 2 G: 20 INJECTION, SOLUTION INTRAVENOUS at 05:55

## 2022-11-10 RX ADMIN — SODIUM CHLORIDE, SODIUM GLUCONATE, SODIUM ACETATE, POTASSIUM CHLORIDE, MAGNESIUM CHLORIDE, SODIUM PHOSPHATE, DIBASIC, AND POTASSIUM PHOSPHATE 100 ML/HR: .53; .5; .37; .037; .03; .012; .00082 INJECTION, SOLUTION INTRAVENOUS at 00:10

## 2022-11-10 RX ADMIN — PROPOFOL 10 MCG/KG/MIN: 10 INJECTION, EMULSION INTRAVENOUS at 16:11

## 2022-11-10 RX ADMIN — KETAMINE HYDROCHLORIDE 0.1 MG/KG/HR: 50 INJECTION, SOLUTION INTRAMUSCULAR; INTRAVENOUS at 17:49

## 2022-11-10 RX ADMIN — ACETAMINOPHEN 650 MG: 650 SUPPOSITORY RECTAL at 20:00

## 2022-11-10 RX ADMIN — Medication 10 MG: at 14:49

## 2022-11-10 RX ADMIN — FENTANYL CITRATE 50 MCG: 50 INJECTION INTRAMUSCULAR; INTRAVENOUS at 16:55

## 2022-11-10 RX ADMIN — PANTOPRAZOLE SODIUM 40 MG: 40 INJECTION, POWDER, FOR SOLUTION INTRAVENOUS at 08:09

## 2022-11-10 RX ADMIN — SODIUM CHLORIDE, SODIUM GLUCONATE, SODIUM ACETATE, POTASSIUM CHLORIDE, MAGNESIUM CHLORIDE, SODIUM PHOSPHATE, DIBASIC, AND POTASSIUM PHOSPHATE 100 ML/HR: .53; .5; .37; .037; .03; .012; .00082 INJECTION, SOLUTION INTRAVENOUS at 10:10

## 2022-11-10 RX ADMIN — SODIUM CHLORIDE 50 MCG/HR: 900 INJECTION, SOLUTION INTRAVENOUS at 00:05

## 2022-11-10 RX ADMIN — ALBUMIN (HUMAN) 50 G: 0.25 INJECTION, SOLUTION INTRAVENOUS at 09:39

## 2022-11-10 RX ADMIN — PROPOFOL 25 MCG/KG/MIN: 10 INJECTION, EMULSION INTRAVENOUS at 05:35

## 2022-11-10 RX ADMIN — Medication 10 MG: at 20:10

## 2022-11-10 RX ADMIN — FENTANYL CITRATE 50 MCG: 50 INJECTION INTRAMUSCULAR; INTRAVENOUS at 23:50

## 2022-11-10 RX ADMIN — INSULIN LISPRO 2 UNITS: 100 INJECTION, SOLUTION INTRAVENOUS; SUBCUTANEOUS at 12:26

## 2022-11-10 RX ADMIN — SODIUM CHLORIDE, SODIUM GLUCONATE, SODIUM ACETATE, POTASSIUM CHLORIDE, MAGNESIUM CHLORIDE, SODIUM PHOSPHATE, DIBASIC, AND POTASSIUM PHOSPHATE 100 ML/HR: .53; .5; .37; .037; .03; .012; .00082 INJECTION, SOLUTION INTRAVENOUS at 20:15

## 2022-11-10 RX ADMIN — HEPARIN SODIUM 5000 UNITS: 5000 INJECTION INTRAVENOUS; SUBCUTANEOUS at 14:22

## 2022-11-10 RX ADMIN — CHLORHEXIDINE GLUCONATE 15 ML: 1.2 SOLUTION ORAL at 00:15

## 2022-11-10 RX ADMIN — CHLORHEXIDINE GLUCONATE 15 ML: 1.2 SOLUTION ORAL at 08:09

## 2022-11-10 RX ADMIN — ERTAPENEM SODIUM 1000 MG: 1 INJECTION, POWDER, LYOPHILIZED, FOR SOLUTION INTRAMUSCULAR; INTRAVENOUS at 16:01

## 2022-11-10 RX ADMIN — HEPARIN SODIUM 5000 UNITS: 5000 INJECTION INTRAVENOUS; SUBCUTANEOUS at 05:55

## 2022-11-10 RX ADMIN — CHLORHEXIDINE GLUCONATE 15 ML: 1.2 SOLUTION ORAL at 20:10

## 2022-11-10 RX ADMIN — INSULIN LISPRO 1 UNITS: 100 INJECTION, SOLUTION INTRAVENOUS; SUBCUTANEOUS at 08:33

## 2022-11-10 RX ADMIN — HEPARIN SODIUM 5000 UNITS: 5000 INJECTION INTRAVENOUS; SUBCUTANEOUS at 21:40

## 2022-11-10 RX ADMIN — SODIUM CHLORIDE 50 MCG/HR: 900 INJECTION, SOLUTION INTRAVENOUS at 11:19

## 2022-11-10 NOTE — QUICK NOTE
Paged by the MICU to evaluate the patient's recurrent hypoxia and encephalopathy with recent reports of chest pain and diaphoresis/generalized shaking  Upon bedside evaluation the patient was non-verbal, tachycardic to ~120bpm, hypoxic to an SpO2 of ~65% on 15L via NRB  His abdomen was moderately distended and clearly TTP mostly in the upper abdomen LUQ>RUQ  Given his profound hypoxia and AMS, the patient was intubated without incident by the MICU team w/ improvement in his SpO2 to 95%  Will obtain a repeat CT chest (PE protocol) + abd/pelvis w/ a full set of labs and an EKG      Gil Patel MD  PGY-4, General Surgery  11/9/2022

## 2022-11-10 NOTE — RESPIRATORY THERAPY NOTE
RT Protocol Note  Sebastian Spain 61 y o  male MRN: [id number]  Unit/Bed#: MICU 02 Encounter: [id number]    Assessment    Principal Problem:    Colon cancer metastasized to liver [facility])  Active Problems:    Malignant neoplasm of transverse colon ([facility] )    Encephalopathy      Home Pulmonary Medications:  na       Past Medical History:   Diagnosis Date    Abdominal pain 03/12/2022    Acute renal failure ([place] [address] )     [id number] resolved    Cancer ([place] [address] )     Diabetes mellitus ([place] [address] )     Enteritis 08/23/2016    Gastroparesis due to DM ([place] [address] ) 08/23/2016    GERD (gastroesophageal reflux disease)     Hernia, ventral 08/04/2016    Hyperlipidemia     Hypertension     Morbid obesity ([place] [address] ) 04/17/2018    Postoperative visit 03/02/2022    SIRS (systemic inflammatory response syndrome) ([place] [address] ) 03/12/2022    Snoring     Stage 3a chronic kidney disease ([place] [address] ) 02/19/2022     Social History     Socioeconomic History    Marital status: /Civil Union     Spouse name: None    Number of children: None    Years of education: None    Highest education level: None   Occupational History    Occupation: ACTOR     Employer: [employer]   Tobacco Use    Smoking status: Never Smoker    Smokeless tobacco: Never Used   Vaping Use    Vaping Use: Never used   Substance and Sexual Activity    Alcohol use: Never    Drug use: No    Sexual activity: Yes     Partners: Female   Other Topics Concern    None   Social History Narrative    None     Social Determinants of Health     Financial Resource Strain: Not on file   Food Insecurity: No Food Insecurity    Worried About Running Out of Food in the Last Year: Never true    Ran Out of Food in the Last Year: Never true   Transportation Needs: No Transportation Needs    Lack of Transportation (Medical): No    Lack of Transportation (Non-Medical):  No   Physical Activity: Insufficiently Active    Days of Exercise per Week: 5 days    Minutes of Exercise per Session: 10 min   Stress: No Stress Concern Present Feeling of Stress : Not at all   Social Connections: Not on file   Intimate Partner Violence: Not on file   Housing Stability: Low Risk     Unable to Pay for Housing in the Last Year: No    Number of Places Lived in the Last Year: 1    Unstable Housing in the Last Year: No       Subjective         Objective    Physical Exam:   Assessment Type: Assess only  General Appearance: Sedated  Respiratory Pattern: Assisted  Chest Assessment: Chest expansion symmetrical  Bilateral Breath Sounds: Clear, Diminished  Cough: Non-productive  Suction: ET Tube  O2 Device: Ventilator    Vitals:  Blood pressure 140/85, pulse (!) 106, temperature 97 8 °F (36 6 °C), temperature source Oral, resp  rate 16, height 5' 8" (1 727 m), weight 112 kg (247 lb), SpO2 100 %  Results from last 7 days   Lab Units 11/09/22  2159 11/09/22  1630 11/09/22  0423   PH ART  7 284*   < > 7 422   PCO2 ART mm Hg 43 0   < > 35 3*   PO2 ART mm Hg 70 5*   < > 62 8*   HCO3 ART mmol/L 19 9*   < > 22 5   BASE EXC ART mmol/L -6 4   < > -1 6   O2 CONTENT ART mL/dL 13 8*   < > 12 6*   O2 HGB, ARTERIAL % 89 8*   < > 89 9*   ABG SOURCE  Line, Arterial   < > Line, Arterial   UMU TEST   --   --  No    < > = values in this interval not displayed  Imaging and other studies: I have personally reviewed pertinent reports  O2 Device: Ventilator     Plan    Respiratory Plan: Vent/NIV/HFNC        Resp Comments: Pt  intubated at bedside by resident with 8  0ETT marked 26@ lip and secured with ETT leslie  +ETCO2 color change with 6th breath and pt has bilateraly breath sounds  Pt  taken to CT scan via transport vent and back to MICU 2 without incident  Pt  placed on APV/CMV mode ventilation via [address] ventilator upon return  ETT pulled back 2cm to 24 @ lip per orders  Pt's breath sounds are diminished but clear  No bronchodilators are indicated at this time  Will continue to assess and monitor pt per Respiratory protocol

## 2022-11-10 NOTE — PROGRESS NOTES
Progress Note - Infectious Disease   Nolvia Chaney 61 y o  male MRN: [id number]  Unit/Bed#: MICU 02 Encounter: [id number]      Impression/Plan:  1  Sepsis-evolving since admission  Appears to be secondary to ESBL E coli bacteremia in the setting of recent complex surgical intervention in the intra-abdominal space  Patient has been resuscitated for ongoing hemorrhage and is clinically improved and seems to be tolerating the antibiotics without difficulty  He is not requiring any vasopressor support   -continue ertapenem  -recheck CBC with diff and BMP  -recheck blood cultures x2 sets to confirm clearance in the setting of a patient with a Port-A-Cath in place  -follow up sensitivities and adjust antibiotics as needed  -supportive care in the intensive care unit  -no additional ID workup for now     2  ESBL e coli bacteremia  Likely secondary to gut translocation during recent colon surgery  No other definitive source appreciated  Patient does have a Port-A-Cath in place     -continue IV ertapenem  -check CBCD and CMP tomorrow  -follow up formal ID and sensitivity  -recheck blood cultures x2 sets confirm clearance in the setting of Port-A-Cath in place  -monitor vitals     3  Metastatic colon cancer  Patient with known metastasis to liver  Patient follows closely with Dr John Romero  He is now s/p transverse colon resection, loop colostomy reversal, peritoneal bx, and segment 3 liver resection on 11/7/2022  It has been determined he will require additional R hepatic resection in the future    -continue close follow up with surgical oncology     4  Acute kidney injury  On CKD stage 3  Likely due to sepsis and hemorrhagic shock above  Upon review of patient's available past medical records it appears his baseline creatinine is approximately 1 2    Renal function remains abnormal but stable  -recheck BMP  -dose adjust antibiotic for renal function as needed  -avoid nephrotoxins  -volume management per critical care team     5  Acute hypoxic respiratory failure  Now with progressive decline and requiring ventilatory support  No pulmonary embolism or clear pneumonia seen on CT scan   -volume management per critical care team  -pulmonary toilet  -monitor vitals  -monitor respiratory status  -wean off ventilator as able  -recheck chest x-ray     6  Acute encephalopathy  Likely multifactorial in setting of sepsis, hemorrhagic shock, hypoxia, and ESBL e coli bacteremia  Patient very difficult to wake up during my visit but was eventually able to tell me his name, wife's name, and that he was in [facility]  Also remembered his nurse and recalled my name towards end of my visit  Now sedated on the ventilator  -antibiotic as above  -serial neuro exams  -monitor mood and mental status  -supportive care     7  Type 2 diabetes mellitus without long term insulin use  Patient's last HbA1c was 8 4% on 2022  Elevated blood glucose is risk factor for infection  Inpatient endocrinology is following patient closely and are titrating insulin dosing   -blood glucose management per endocrinology      8  Morbid obesity  BMI = 37 56  Discussed the above management plan with the critical care service    Antibiotics:  Ertapenem 2    Subjective:  Events over the last 18 hours noted  Patient now intubated on a ventilator  He is not requiring any vasopressor support  Patient has no fever, chills, sweats; no report vomiting, diarrhea; has not yet had a bowel movement    Objective:  Vitals:  Temp:  [97 52 °F (36 4 °C)-99 68 °F (37 6 °C)] 97 52 °F (36 4 °C)  HR:  [] 92  Resp:  [12-31] 16  BP: (116-171)/(69-94) 140/85  SpO2:  [81 %-100 %] 96 %  Temp (24hrs), Av 1 °F (36 7 °C), Min:97 52 °F (36 4 °C), Max:99 68 °F (37 6 °C)  Current: Temperature: 97 52 °F (36 4 °C)    Physical Exam:   General Appearance:  Resting on the ventilator no acute distress   Throat: Oropharynx moist without lesions    Endotracheal tube in place   Lungs:   Clear to auscultation bilaterally; no wheezes, rhonchi or rales; respirations unlabored   Heart:  RRR; no murmur, rub or gallop   Abdomen:   Soft, non-tender, non-distended, positive bowel sounds  Incision without erythema or drainage    Extremities: No clubbing, cyanosis or edema   Skin: No new rashes or lesions  No draining wounds noted  Labs, Imaging, & Other studies:   All pertinent labs and imaging studies were personally reviewed  Results from last 7 days   Lab Units 11/10/22  0450 11/10/22  0215 11/10/22  0000 11/09/22  2215   WBC Thousand/uL 14 02*  --  9 18 1 40*   HEMOGLOBIN g/dL 8 4* 8 5* 8 3* 9 7*   PLATELETS Thousands/uL 139*  --  133* 199     Results from last 7 days   Lab Units 11/10/22  0450 11/09/22  2215 11/09/22  0423 11/08/22  1913 11/08/22  0631 11/07/22  0835   SODIUM mmol/L 140 138 137 135   < >  --    POTASSIUM mmol/L 4 3 4 7 4 0 4 5   < >  --    CHLORIDE mmol/L 109* 109* 106 106   < >  --    CO2 mmol/L 23 21 23 22   < >  --    CO2, I-STAT mmol/L  --   --   --   --   --  26   BUN mg/dL 44* 38* 33* 31*   < >  --    CREATININE mg/dL 2 21* 2 33* 2 13* 2 28*   < >  --    EGFR ml/min/1 73sq m 31 29 33 30   < >  --    GLUCOSE, ISTAT mg/dl  --   --   --   --   --  169*   CALCIUM mg/dL 8 1* 8 9 8 5 8 1*   < >  --    AST U/L 127*  --  308* 379*  --   --    ALT U/L 137*  --  222* 263*   < >  --    ALK PHOS U/L 247*  --  192* 179*   < >  --     < > = values in this interval not displayed  Results from last 7 days   Lab Units 11/10/22  0450 11/08/22  2230   BLOOD CULTURE  Received in Microbiology Lab  Culture in Progress  Received in Microbiology Lab  Culture in Progress  --    GRAM STAIN RESULT   --  Gram negative rods*  Gram negative rods*        CT chest abdomen pelvis-slightly increased perihepatic collection, hematoma, and ascites  No pulmonary embolism  No pneumonia      Images personally reviewed by me in PACS    Await formal radiology interpretation

## 2022-11-10 NOTE — PROGRESS NOTES
[facility] Neurology Progress note    Name: Madison Andrea   Age & Sex: 61 y o  male   MRN: [id number]  [facility]   Encounter: [id number]    Recommendations for outpatient neurological follow up have yet to be determined  Pending for discharge:  Intubated, sedated requiring ICU level of care    Assessment plan:    Encephalopathy  Assessment & Plan  62 yo male noted to be increasingly encephalopathic in setting of recent procedures due to metastatic colon cancer that eventually led to an episode of hypotension, tachycardia with discovery of LUQ hematoma requiring blood transfusion  Initial CTH negative  On 11/09, neurologic exam showed right visual field deficit, however, no significant mental status deficit  It appears that patient's symptoms may be due to metabolic toxic etiologies, but ischemic brain injury cannot be entirely excluded given visual field deficit on exam and prior episodes of hypotension  However, given the overnight events of 11/9-11/10, it appears that patient may have sustained hypoxic brain injury  Given limited neurologic exam this morning, imaging is necessary to properly assess possible CNS damage  Plan:  · Recommend checking ammonia levels  · Recommend MRI brain without contrast  · On ertapenem for gram-negative bacteremia, will defer antibiotic management to primary/ID team  · Rest of care as per primary team      Subjective:     Patient seen and examined at bedside  Unfortunately, overnight patient complained of chest pain and abdominal pain and became tachycardic hypertensive, hypoxic with altered mental status noted by ICU team, eventually requiring intubation  Repeat CTA CPAP showed enlarging hematoma  He was also found to have Gram-negative bacteremia  Review of Systems  ROS -cannot be performed as patient is intubated and sedated during the time of interview/exam this morning  Objective:     Patient ID: Madison Andrea is a 61 y o  male      Vitals: 11/10/22 1430 11/10/22 1500 11/10/22 1530 11/10/22 1600   BP:       BP Location:       Pulse: 100 96 98 98   Resp: 17 19 18 17   Temp: 98 96 °F (37 2 °C) 99 32 °F (37 4 °C) 99 32 °F (37 4 °C) 99 68 °F (37 6 °C)   TempSrc:    Esophageal   SpO2: 97% 96% 96% 97%   Weight:       Height:          Temperature:   Temp (24hrs), Av 1 °F (36 7 °C), Min:97 16 °F (36 2 °C), Max:99 68 °F (37 6 °C)    Temperature: 99 68 °F (37 6 °C)    Physical exam:    General, intubated, sedated  CV: S1, S2 noted    Neurological Exam:    Mental status:    Patient minimally responds to auditory stimuli :  His name  Does respond to visual threat b/l  Does withdraw to pressure, noxious stimuli in all 4 extremities  Level of consciousness is stuporous    Cranial nerves:    Pupils are responsive, shape and size are equal   Occulocephalic reflex is pharmacologically suppressed  Grimace to pain is absent in all 4 extremities  Gag present, weak cough present  Corneal reflexes present bilaterally  Motor function: Tone is: normal   Muscle bulk is: normal   Spontaneous movements in all 4 extremities? None     Reflexes:  Trace reflexes throughout bilaterally (biceps, triceps, patellar)  Clonus:  None  Plantar mute b/l  Labs: I have personally reviewed pertinent reports  Results from last 7 days   Lab Units 11/10/22  0450 11/10/22  0215 11/10/22  0000 22  2215 22  2230 22  1913 22  0631   WBC Thousand/uL 14 02*  --  9 18 1 40*   < > 10 68* 21 96*   HEMOGLOBIN g/dL 8 4* 8 5* 8 3* 9 7*   < > 8 6* 10 1*   HEMATOCRIT % 24 7* 25 0* 24 4* 29 6*   < > 26 6* 29 7*   PLATELETS Thousands/uL 139*  --  133* 199   < > 225 327   NEUTROS PCT %  --   --   --   --   --   --  88*   MONOS PCT %  --   --   --   --   --   --  7   MONO PCT %  --   --   --  0*  --  0*  --     < > = values in this interval not displayed        Results from last 7 days   Lab Units 11/10/22  0450 22  2215 22  0423 22  1914 11/08/22  0631 11/07/22  0835   SODIUM mmol/L 140 138 137 135   < >  --    POTASSIUM mmol/L 4 3 4 7 4 0 4 5   < >  --    CHLORIDE mmol/L 109* 109* 106 106   < >  --    CO2 mmol/L 23 21 23 22   < >  --    CO2, I-STAT mmol/L  --   --   --   --   --  26   BUN mg/dL 44* 38* 33* 31*   < >  --    CREATININE mg/dL 2 21* 2 33* 2 13* 2 28*   < >  --    CALCIUM mg/dL 8 1* 8 9 8 5 8 1*   < >  --    ALK PHOS U/L 247*  --  192* 179*  --   --    ALT U/L 137*  --  222* 263*  --   --    AST U/L 127*  --  308* 379*  --   --    GLUCOSE, ISTAT mg/dl  --   --   --   --   --  169*    < > = values in this interval not displayed  Results from last 7 days   Lab Units 11/10/22  0450 11/09/22 2215 11/09/22  0423   MAGNESIUM mg/dL 2 5 2 4 2 2     Results from last 7 days   Lab Units 11/10/22  0450 11/09/22  2215 11/09/22  0423   PHOSPHORUS mg/dL 5 0* 5 7* 3 4      Results from last 7 days   Lab Units 11/09/22 2215   INR  1 24*   PTT seconds 37     Results from last 7 days   Lab Units 11/10/22  0450   LACTIC ACID mmol/L 2 0             Imaging:     Radiology Results: I have personally reviewed pertinent reports  XR chest portable   Final Result by Yulissa Peterson MD ([id number])      Improvement of CHF since 11/9/2022  Tip of ET tube 3 cm above the jeremiah  Workstation performed: [id number]         CTA chest abdomen pelvis w wo contrast   Final Result by Maria Del Carmen Costa MD (11/10 6756)   Chest:   1  Small bilateral pleural effusions with presumed bibasilar atelectasis  2   Mild pulmonary edema limits evaluation of known small pulmonary nodules  Abdomen and pelvis:   1  Hematoma in the perisplenic region/gastrosplenic ligament is similar in volume to the study 15 hours prior  There is no active bleeding  2   Lack of oral contrast limits evaluation for anastomotic leak, as queried  Anastomosis appears similar to the recent prior study  There is no collection surrounding the anastomosis     3  Expected postsurgical changes related to segment 3 hepatectomy  Unchanged hepatic metastases  Preliminary findings provided by [facility]  Final report is congruent  Workstation performed: [id number]         XR chest portable   Final Result by Juancarlos Duarte MD (11/09 1153)      1  Pulmonary edema and trace bilateral pleural effusions  2   Retrocardiac opacification and medial right lower lung zone opacification, likely due to atelectasis and/or pleural effusions  Workstation performed: [id number]         CTA chest (pe study) abdomen pelvis routine contrast   Final Result by Manuel Fischer MD (11/08 2136)      1  No pulmonary embolism  2   Acute hemorrhage in the left upper abdomen close to the anastomosis  3   Mild wall thickening and inflammation of the cecum is nonspecific, though can represent postoperative change  4   Pulmonary edema with moderate sized bilateral pleural effusions  5   Left retrocardiac air and fluid is new since prior study and may represent fluid which extended through the esophageal hiatus  There is no rim enhancement to suggest abscess formation at this time  I personally discussed this study with Thanh Sanchez on 11/8/2022 at 8:50 PM and 9:35 PM          Workstation performed: [id number]         CT head wo contrast   Final Result by Morro Bae MD (11/08 2118)      No acute territorial infarct, hemorrhage, or midline shift  Workstation performed: [id number]         XR chest portable   Final Result by Manuel Fischer MD (11/09 3318)      Pulmonary edema  Workstation performed: [id number]         MRI inpatient order    (Results Pending)       Other Diagnostic Testing: I have personally reviewed pertinent reports        Active Medications:     Current Facility-Administered Medications   Medication Dose Route Frequency   • chlorhexidine (PERIDEX) 0 12 % oral rinse 15 mL  15 mL Mouth/Throat Q12H [facility]   • ertapenem (INVanz) 1,000 mg in sodium chloride 0 9 % 50 mL IVPB  1,000 mg Intravenous Q24H   • fentanyl citrate (PF) 100 MCG/2ML 50 mcg  50 mcg Intravenous Q1H PRN   • glycopyrrolate (ROBINUL) injection 0 2 mg  0 2 mg Intravenous Q4H PRN   • haloperidol lactate (HALDOL) injection 2 mg  2 mg Intramuscular Q30 Min PRN   • heparin (porcine) subcutaneous injection 5,000 Units  5,000 Units Subcutaneous Q8H [facility]   • insulin lispro (HumaLOG) 100 units/mL subcutaneous injection 2-12 Units  2-12 Units Subcutaneous Q6H [facility]   • iohexol (OMNIPAQUE) 350 MG/ML injection (SINGLE-DOSE) 100 mL  100 mL Intravenous Once in imaging   • ketamine 250 mg (STANDARD CONCENTRATION) IV in sodium chloride 0 9% 250 mL  0 1 mg/kg/hr (Ideal) Intravenous Continuous   • labetalol (NORMODYNE) injection 10 mg  10 mg Intravenous Q4H PRN   • LORazepam (ATIVAN) injection 1 mg  1 mg Intravenous Q1H PRN   • multi-electrolyte (PLASMALYTE-A/ISOLYTE-S PH 7 4) IV solution  100 mL/hr Intravenous Continuous   • norepinephrine (LEVOPHED) 4 mg (STANDARD CONCENTRATION) IV in sodium chloride 0 9% 250 mL  1-30 mcg/min Intravenous Titrated   • ondansetron (ZOFRAN) injection 4 mg  4 mg Intravenous Q4H PRN   • pantoprazole (PROTONIX) injection 40 mg  40 mg Intravenous Q24H [facility]   • propofol (DIPRIVAN) 1000 mg in 100 mL infusion (premix)  5-50 mcg/kg/min Intravenous Titrated   • ropivacaine 0 1% and fentaNYL 2 mcg/mL PCEA   Epidural Continuous       Prior to Admission medications    Medication Sig Start Date End Date Taking? Authorizing Provider   amLODIPine (NORVASC) 5 mg tablet TAKE 1 TABLET BY MOUTH TWICE A DAY 6/30/22  Yes Homer Sierra MD   aspirin 81 MG tablet Take 81 mg by mouth daily     Yes Historical Provider, MD   atorvastatin (LIPITOR) 40 mg tablet TAKE 1 TABLET BY MOUTH EVERY DAY 6/30/22  Yes Homer Sierra MD   Cholecalciferol (VITAMIN D3 PO) Take 400 Units by mouth daily   Yes Historical Provider, MD   DULoxetine (Cymbalta) 30 mg delayed release capsule Take 1 capsule (30 mg total) by mouth daily for 7 days, THEN 2 capsules (60 mg total) daily for 21 days   10/21/22 11/18/22 Yes Bre Rivera MD   glyBURIDE-metFORMIN (GLUCOVANCE) 5-500 MG per tablet Take 1 tablet by mouth daily with breakfast Taking 1 tablet in the morning    Yes Historical Provider, MD Tianuvia 100 MG tablet TAKE 1 TABLET BY MOUTH EVERY DAY 3/11/22  Yes James Youngblood MD   Lantus SoloStar 100 units/mL SOPN INJECT 30 UNITS UNDER THE SKIN DAILY  Patient taking differently: Inject 35 Units under the skin daily 9/29/22  Yes James Youngblood MD   lisinopril (ZESTRIL) 40 mg tablet TAKE 1 TABLET BY MOUTH EVERY DAY 3/11/22  Yes James Youngblood MD   methocarbamol (ROBAXIN) 500 mg tablet Take 1 tablet (500 mg total) by mouth 2 (two) times a day 10/26/22   James Youngblood MD   metoclopramide (REGLAN) 10 mg tablet TAKE 1 TABLET BY MOUTH TWICE A DAY 9/5/22  Yes James Youngblood MD   Multiple Vitamins-Minerals (multivitamin with minerals) tablet Take 1 tablet by mouth daily   Yes Historical Provider, MD   pantoprazole (PROTONIX) 40 mg tablet TAKE 1 TABLET BY MOUTH TWICE A DAY 5/8/22  Yes James Youngblood MD   Continuous Blood Gluc Sensor (FreeStyle Parrish 14 Day Sensor) MISC Use 1 each every 14 (fourteen) days 3/11/22   GAURANG Martinez   Insulin Pen Needle (B-D UF III MINI PEN NEEDLES) 31G X 5 MM MISC Inject under the skin daily 5/13/22   James Youngblood MD   Needle, Disp, (HYPODERMIC NEEDLE) 23G X 1-1/2" MISC by Does not apply route once a week 5/10/18   Rowena Cordero PA-C   ondansetron (ZOFRAN) 4 mg tablet Take 4 mg by mouth every 6 (six) hours as needed 1/10/22   Historical Provider, MD   Ostomy Supplies MISC Use in the morning 2/23/22   James Youngblood MD         VTE Pharmacologic Prophylaxis: Heparin  VTE Mechanical Prophylaxis: sequential compression device    ==  Dee Stanley MD , MD [facility], PGY-II

## 2022-11-10 NOTE — NUTRITION
11/10/22 0816   Nutrition-Focused Physical Exam   Nutrition-Focused Physical Exam Findings non-pitting edema all extremities  surgical site abdomen  OGT low intermittent suction  UOP 0 6 mL/kg/hr   Medical-Related Concerns PMH: T2DM, hypokalemia, colon CA with liver mets, colostomy prolapse  S/p ex lap,  transverse colon resection, reversal of loop colostomy, peritoneal biopsy, and segment 3 liver resection 11/07/2022  recurrent acute encephalopathy and hypoxic respiratory failure requiring endotracheal intubation 11/9   Recommendations/Interventions   Recommendations to Provider 1  If unable to extubate and medically appropriate to feed enterally recommend: Vital High Protein at 65 mL/hr  Start at 10 mL and advance by 10 mL q 4 hrs to goal  Provides: 1560 mL TV, 1560 kcal (1737 kcal including propofol), 136g protein, 1304 mL free water  2  Minimum FWF 30 mL q 4 hrs for tube patency    3  if euvolemic and absent of IVF, FWF at 100 mL q 4 hrs will meet hydration needs

## 2022-11-10 NOTE — PHYSICAL THERAPY NOTE
Physical Therapy Cancellation Note       11/10/22 0845   Note Type   Note type Evaluation; Cancelled Session   Cancel Reasons Intubated/sedated     CHART REVIEW COMPLETED  PT NOT APPROPRIATE FOR PARTICIPATION IN PT AT THIS TIME 2* MEDICAL STATUS- currently intubated/ sedated  WILL CONTINUE TO FOLLOW AND COMPLETE PT EVALUATION AS MEDICAL STATUS DICTATES     Hayden Quinonez PT

## 2022-11-10 NOTE — PROCEDURES
Intubation    Date/Time: 11/9/2022 10:15 PM  Performed by: Laci Olivia DO  Authorized by: Laci Olivia DO     Patient location:  Bedside  Other Assisting Provider: Yes (comment) Ludwin Jeffrey PA-C)    Consent:     Consent obtained:  Emergent situation  Pre-procedure details:     Patient status:  Altered mental status    Pretreatment medications:  Etomidate    Paralytics:  Succinylcholine  Indications:     Indications for intubation: respiratory distress, airway protection and hypoxemia    Procedure details:     Preoxygenation:  Bag valve mask    CPR in progress: no      Intubation method:  Oral    Oral intubation technique: Leger  Laryngoscope blade: Mac 4    Tube size (mm):  8 0    Tube type:  Cuffed    Number of attempts:  2    Ventilation between attempts: yes      Cricoid pressure: no      Tube visualized through cords: yes    Placement assessment:     ETT to lip:  26    Tube secured with:  ETT leslie    Breath sounds:  Equal    Placement verification: chest rise, colorimetric ETCO2 device and equal breath sounds      Chest x-ray findings: At jeremiah on CT,withdrawn 2cm  Post-procedure details:     Patient tolerance of procedure:   Tolerated well, no immediate complications  Comments:      Anterior airway, patient repositioned, successful intubation

## 2022-11-10 NOTE — PROGRESS NOTES
Spiritual Care Progress Note    11/10/2022  Patient: Sebastian Spain : 1965  Admission Date & Time: 2022 0515  MRN: [id number] [facility]: [id number]      [facility] visited pt while doing rounds on the unit  Pt is currently intubated and sedated and unable to respond, however  provided prayer for pt and his comfort while in the hospital     Chaplains remain available                 Chaplaincy Interventions Utilized/Chaplaincy Outcomes Achieved/Spiritual Coping Strategies Utilized:   Pt intubated and sedated, unable to assess

## 2022-11-10 NOTE — ASSESSMENT & PLAN NOTE
60 yo male noted to be increasingly encephalopathic in setting of recent procedures due to metastatic colon cancer that eventually led to an episode of hypotension, tachycardia with discovery of LUQ hematoma requiring blood transfusion  Initial CTH negative  On 11/09, neurologic exam showed right visual field deficit, however, no significant mental status deficit  It appears that patient's symptoms may be due to metabolic toxic etiologies, but ischemic brain injury cannot be entirely excluded given visual field deficit on exam and prior episodes of hypotension  However, given the overnight events of 11/9-11/10, it appears that patient may have sustained hypoxic brain injury  Given limited neurologic exam this morning, imaging was required to properly assess possible CNS damage, however, fortunately, MRI brain is unremarkable for mets/ischemia  Patient's symptoms are most likely due to acute toxic metabolic etiologies given the events of the last few days and his hospital course so far  He may also have underlying depression that may be contributing to his mental status  Plan:  · No further inpatient neurology recommendations for now    · On ertapenem for gram-negative bacteremia, will defer antibiotic management to primary/ID team  · Rest of care as per primary team

## 2022-11-10 NOTE — OCCUPATIONAL THERAPY NOTE
OT CANCEL NOTE    OT orders received  Chart reviewed  Pt is currently intubated/sedated and not appropriate to engage in skilled OT services at this time  Will hold initial OT evaluation  Will continue to follow pt on caseload and see pt when medically stable and as clinically appropriate  11/10/22 0803   OT Last Visit   OT Visit Date 11/10/22   Note Type   Note type Evaluation; Cancelled Session   Cancel Reasons Intubated/sedated       Demetri Raman MS, OTR/L

## 2022-11-10 NOTE — PROGRESS NOTES
Progress Note - Surgical Oncology  Savage Holloway 61 y o  male MRN: [id number]  [facility]counter: [id number]      Assessment:  60yo M POD3 s/p exploratory laparotomy, transverse colon resection, reversal of loop colostomy, peritoneal biopsy, and segment 3 hepatic resection w/ a post-operative course complicated by recurrent acute encephalopathy and hypoxic respiratory failure requiring endotracheal intubation last evening     - Weaned off of all vasopressor support this AM  - Ventilator requirements improving, now 40% FiO2 PEEP8  - Abdomen w/ no signs of peritonitis  - Rivero catheter in place w/ 0 6cc/kg/hr UOP (1,725mL/24hrs)  - Hgb stable at 8 4 from 8 5, no evidence of active hemorrhage on CT scan  - E  Coli bacteremia receiving Ertapenem    Plan:  - Continue to monitor hemoglobin for evidence of ongoing intra-abdominal hemorrhage  - Continue arterial line for monitoring of BP, vasopressors currently weaned off  - Management of E  Coli bacteremia per infectious disease, currently on Ertapenem  - Wean mechanical ventilation as tolerated, currently on 40% FiO2 PEEP8  - Continue rivero catheter for monitoring of outputs  - Appreciate ICU, cardiology, and APS assistance    Subjective/Objective     Subjective:   Overnight the patient exhibited recurrent acute encephalopathy and hypoxic respiratory failure leading to endotracheal intubation and repeat CT chest/abdomen/pelvis, please see previous progress notes for full details  This morning the patient was successfully weaned off of all vasopressor support with decreasing ventilator requirements, down to 40% FiO2, peep 8  His urine output is adequate, his hemoglobin is stable, and his lactic acidosis is almost completely resolved  Objective:     Blood pressure 140/85, pulse 92, temperature 97 52 °F (36 4 °C), resp  rate 16, height 5' 8" (1 727 m), weight 118 kg (260 lb 9 3 oz), SpO2 96 %  ,Body mass index is 39 62 kg/m²      Gen: Intubated/sedated  Head: Normocephalic, atraumatic  Neck: No obvious JVD, trachea midline  Cardiovascular: Regular rate  Respiratory:  Intubated on mechanical ventilation FiO2 40%, peep 8 saturating 95% +  Abd:  Soft, mildly distended, and with guarding, rigidity, or signs of generalized peritoneal  Extremities: No visible wounds/ulcerations to the B/L upper/lower extremities  Skin: Warm/dry  Psychiatric: Appropriate mood/affect    Intake/Output Summary (Last 24 hours) at 11/10/2022 0749  Last data filed at 11/10/2022 0645  Gross per 24 hour   Intake 2606 94 ml   Output 1725 ml   Net 881 94 ml       Invasive Devices  Report    Central Venous Catheter Line  Duration           Port A Cath 03/10/22 Right Chest 244 days          Peripheral Intravenous Line  Duration           Peripheral IV 11/09/22 Left;Proximal;Ventral (anterior) Forearm 1 day    Peripheral IV 11/09/22 Proximal;Right;Ventral (anterior) Forearm 1 day    Peripheral IV 11/10/22 Dorsal (posterior); Left Wrist <1 day    Peripheral IV 11/10/22 Dorsal (posterior); Right Wrist <1 day          Epidural Line  Duration           Epidural Catheter 11/07/22 3 days          Arterial Line  Duration           Arterial Line 11/08/22 Radial 1 day          Drain  Duration           Urethral Catheter Latex 16 Fr  3 days    NG/OG/Enteral Tube Orogastric 16 Fr Center mouth <1 day          Airway  Duration           ETT  Cuffed 8 mm <1 day                I have personally reviewed pertinent lab results    , CBC:   Lab Results   Component Value Date    WBC 14 02 (H) 11/10/2022    HGB 8 4 (L) 11/10/2022    HCT 24 7 (L) 11/10/2022    MCV 92 11/10/2022     (L) 11/10/2022    MCH 31 2 11/10/2022    MCHC 34 0 11/10/2022    RDW 13 6 11/10/2022    MPV 10 7 11/10/2022   , CMP:   Lab Results   Component Value Date    SODIUM 140 11/10/2022    K 4 3 11/10/2022     (H) 11/10/2022    CO2 23 11/10/2022    BUN 44 (H) 11/10/2022    CREATININE 2 21 (H) 11/10/2022    CALCIUM 8 1 (L) 11/10/2022     (H) 11/10/2022     (H) 11/10/2022    ALKPHOS 247 (H) 11/10/2022    EGFR 31 11/10/2022   , Coagulation:   Lab Results   Component Value Date    INR 1 24 (H) 11/09/2022

## 2022-11-10 NOTE — QUICK NOTE
NOTE:   Notified by ICU team that patient continues to be in pain despite restarting of epidural while intubated and sedated  Given the good results with the bolus with lidocaine 1% at the restarting of the epidural infusion earlier today, I believe the epidural coverage is not dense enough  I am hesitant to increase the epidural solution concentration to Ropivacaine 0 2% as it is only available without fentanyl  In addition, due to the patient's clinical status the epidural may likely need to be removed pending his response to antibiotics and his coagulation status  Vitals:    11/10/22 1600   BP: 168/64   Pulse: 98   Resp: 17   Temp: 99 68 °F (37 6 °C)   SpO2: 97%     Lab Results   Component Value Date    WBC 14 02 (H) 11/10/2022    HGB 8 4 (L) 11/10/2022    HCT 24 7 (L) 11/10/2022    MCV 92 11/10/2022     (L) 11/10/2022     Lab Results   Component Value Date    INR 1 24 (H) 11/09/2022    INR 1 0 09/26/2022    INR 1 18 02/23/2022    PROTIME 15 8 (H) 11/09/2022    PROTIME 10 6 09/26/2022    PROTIME 14 6 (H) 02/23/2022         PLAN:  Need to establish good pain control with the anticipation that he will not have the epidural as this is critical to facilitate adequate respiratory effort after extubation        - start ketamine 0 1mg/kg/hr (ideal body weight based dosing)     - please monitor for possible complications including adverse hemodynamic changes including sustained tachycardia, increased or new arrhythmias, increased hypertension, seizures     - continue epidural at Ropi 0 1% + fentanyl 2mcg/mL at 12/5/10/3   - continue fenanyl 50mcg q1h PRN for breakthrough pain (may need to increase)

## 2022-11-10 NOTE — PROGRESS NOTES
Daily Progress Note - Critical Care   Tammy Burnett 61 y o  male MRN: [id number]  Unit/Bed#: MICU 02 Encounter: [id number]        ----------------------------------------------------------------------------------------  HPI/24hr events:  Patient is a 48-year-old male with a significant past medical history of metastatic colon cancer, status post exploratory laparotomy transverse colon resection, reversal of loop colostomy, peritoneal biopsy, and segment 3 liver resection 11/07/2022  Patient with CT head and CTA chest abdomen pelvis on 11/08/2022 that showed hematoma with no active extravasation, resuscitated with 2 units PRBCs and 1 unit FFP  Overnight patient found to be tachycardic, hypertensive, hypoxic, with altered mental status after repositioning  Patient was intubated with repeat CTA chest/abdomen/pelvis showing slightly increase in volume of perihepatic fluid, slightly increased size of large hematoma, free fluid in pelvis is stable, pneumoperitoneum without significant change, no pulmonary emboli     ---------------------------------------------------------------------------------------  SUBJECTIVE  Patient is intubated  Prior to intubation, patient was complaining of abdominal and chest pain      Review of Systems   Unable to perform ROS: Intubated     Review of systems was reviewed and negative unless stated above in HPI/24-hour events   ---------------------------------------------------------------------------------------  Assessment and Plan:    Neuro:   Diagnosis: Acute metabolic encephalopathy, altered mental status  11/8 CT Head: negative  Plan:   Delirium precautions, Maintain sleep-wake cycle  Serial neuro exams  Consider MRI  Neurology consult  Diagnosis: Pain Control  Plan: Multimodal analgesia  Fentanyl, PCEA  APS consult    CV:   Diagnosis: Hemorrhagic Shock  11/8 CT c/a/p: 8cm LUQ hematoma, no active extravasation  Received 2 units PRBCs, 1 unit FFP  Plan:   Continue to monitor signs and symptoms bleeding  Trend hemoglobin  Transfuse for hemoglobin less than 7  Maintain map greater than 65  Levophed as necessary  Diagnosis: Paroxysmal atrial tachycardia  Trops negative  EKG no STEMI or acute changes  Plan:   Continue cardiac monitoring  Cardiology consult  Follow-up echo  Diagnosis: HTN  Plan:   Continue to hold home lisinopril and amlodipine  Labetalol prn for SBP >160  Diagnosis: HLD  Plan: hold home lipitor while intubated/NPO    Pulm:  Diagnosis: Acute hypoxic respiratory failure, Intubated  11/8 CT:  Pulmonary edema, bilateral pleural effusions  11/9 CT:  No pulmonary emboli, bilateral pleural effusions/atelectasis  Plan:   Intubated, vent bundle ordered  Maintain SpO2 greater than 92%  Wean vent as tolerated, SBT daily    GI:   Diagnosis:  Metastatic lung cancer  Status post ex lap, transverse colon resection, loop colostomy reversal, peritoneal biopsy, segment 3 liver resection 11/07/2022  Plan:  Per Surgical Oncology  Serial abdominal examinations  Diagnosis:  Transaminitis, secondary to liver section  Plan:  Continue to monitor LFTs  Diagnosis:  GERD  Plan:  Continue Protonix    :   Diagnosis:  Acute kidney injury  Plan:   Monitor BUN/creatinine  Strict I&Os  Continue Negro  Consider Lasix as needed    F/E/N:   F: Isolyte @ 100  E: Replace to goal  N: NPO    Heme/Onc:   Diagnosis:  Acute blood loss anemia  Plan:   Continue to monitor hemoglobin  Monitor for signs and symptoms of bleeding  Transfuse for hemoglobin less than 7    Endo:   Diagnosis:  Type 2 diabetes mellitus  Plan:   Endocrinology following  Blood sugar goal 140-180  ISS, Q6h glucose checks    ID:   Diagnosis: SIRS  11/8 Blood Cultures: Gram negative rods, E   Coli likely ESBL  Plan:   Ertapenem IV  Follow cultures  Monitor WBC and fever curve    MSK/Skin:   Diagnosis: Surgical Wound  Plan:  Per Surgical Oncology  Diagnosis:  No acute issues  Plan:  PT/OT when able, frequent turning/repositioning    Patient appropriate for transfer out of the ICU today?: No  Disposition: Continue Critical Care   Code Status: Level 1 - Full Code  ---------------------------------------------------------------------------------------  ICU CORE MEASURES    Prophylaxis   VTE Pharmacologic Prophylaxis: Heparin  VTE Mechanical Prophylaxis: sequential compression device  Stress Ulcer Prophylaxis: Pantoprazole IV     ABCDE Protocol (if indicated)  Plan to perform spontaneous awakening trial today? Yes  Plan to perform spontaneous breathing trial today? Yes  Obvious barriers to extubation? Yes  CAM-ICU: Negative    Invasive Devices Review  Invasive Devices  Report      Central Venous Catheter Line  Duration             Port A Cath 03/10/22 Right Chest 244 days              Peripheral Intravenous Line  Duration             Peripheral IV 22 Left Hand 2 days    Peripheral IV 22 Right Hand 2 days    Peripheral IV 22 Left;Proximal;Ventral (anterior) Forearm 1 day    Peripheral IV 22 Proximal;Right;Ventral (anterior) Forearm 1 day              Epidural Line  Duration             Epidural Catheter 22 2 days              Arterial Line  Duration             Arterial Line 22 Radial 1 day              Drain  Duration             Urethral Catheter Latex 16 Fr  2 days    NG/OG/Enteral Tube Orogastric 16 Fr Center mouth <1 day              Airway  Duration             ETT  Cuffed 8 mm <1 day                  Can any invasive devices be discontinued today?  No  ---------------------------------------------------------------------------------------  OBJECTIVE    Vitals   Vitals:    11/10/22 0100 11/10/22 0200 11/10/22 0230 11/10/22 0300   BP:       BP Location:       Pulse: 102 100 102 100   Resp: 13 12 13 14   Temp: 98 6 °F (37 °C) 98 24 °F (36 8 °C) 98 24 °F (36 8 °C) 97 88 °F (36 6 °C)   TempSrc:       SpO2: 95% 97% 96% 97%   Weight:       Height:         Temp (24hrs), Av 3 °F (36 8 °C), Min:97 8 °F (36 6 °C), Max:99 68 °F (37 6 °C)  Current: Temperature: 97 88 °F (36 6 °C)  HR: 98  BP: 124/74  SpO2: 100    Respiratory:  SpO2: SpO2: 94 %  Nasal Cannula O2 Flow Rate (L/min): 15 L/min    Invasive/non-invasive ventilation settings   Respiratory  Report     Lab Data (Last 4 hours)        11/10 0000 11/10 0215          pH, Arterial       7 350            7 379            pCO2, Arterial       40 0            40 4            pO2, Arterial       103 8            155 5            HCO3, Arterial       21 6            23 3            Base Excess, Arterial       -3 7            -1 7                   O2/Vent Data         11/09 2325     Most Recent      Insp Time (sec) (sec) 1   1                     Physical Exam  Vitals and nursing note reviewed  Constitutional:       Appearance: He is well-developed  Interventions: He is sedated and intubated  HENT:      Head: Normocephalic and atraumatic  Eyes:      Conjunctiva/sclera: Conjunctivae normal       Pupils: Pupils are equal, round, and reactive to light  Cardiovascular:      Rate and Rhythm: Regular rhythm  Tachycardia present  Pulses: Normal pulses  Heart sounds: Normal heart sounds  No murmur heard  Pulmonary:      Effort: He is intubated  Breath sounds: Normal breath sounds and air entry  Abdominal:      Palpations: Abdomen is soft  Comments: Surgical incision with staples to abdomen noted, c/d/i   Musculoskeletal:         General: Normal range of motion  Cervical back: Neck supple  Comments: SCDs in place   Skin:     General: Skin is warm and dry  Capillary Refill: Capillary refill takes less than 2 seconds               Laboratory and Diagnostics:  Results from last 7 days   Lab Units 11/10/22  0215 11/10/22  0000 11/09/22  2215 11/09/22  1135 11/09/22  0423 11/09/22  0207 11/08/22  2230 11/08/22  1913 11/08/22  0631   WBC Thousand/uL  --  9 18 1 40* 19 22* 20 03*  --   --  10 68* 21 96*   HEMOGLOBIN g/dL 8 5* 8 3* 9 7* 9 1* 9 2* 8 8* 7 5* 8 6* 10 1* HEMATOCRIT % 25 0* 24 4* 29 6* 26 7* 26 6* 25 6* 22 1* 26 6* 29 7*   PLATELETS Thousands/uL  --  133* 199 146* 149  --   --  225 327   NEUTROS PCT %  --   --   --   --   --   --   --   --  88*   BANDS PCT %  --   --  6  --   --   --   --  4  --    MONOS PCT %  --   --   --   --   --   --   --   --  7   MONO PCT %  --   --  0*  --   --   --   --  0*  --      Results from last 7 days   Lab Units 11/09/22 2215 11/09/22 0423 11/08/22  1913 11/08/22  0631 11/07/22  0835   SODIUM mmol/L 138 137 135 137  --    POTASSIUM mmol/L 4 7 4 0 4 5 4 9  --    CHLORIDE mmol/L 109* 106 106 108  --    CO2 mmol/L 21 23 22 21  --    CO2, I-STAT mmol/L  --   --   --   --  26   ANION GAP mmol/L 8 8 7 8  --    BUN mg/dL 38* 33* 31* 24  --    CREATININE mg/dL 2 33* 2 13* 2 28* 1 73*  --    CALCIUM mg/dL 8 9 8 5 8 1* 8 2*  --    GLUCOSE RANDOM mg/dL 167* 199* 217* 213*  --    ALT U/L  --  222* 263*  --   --    AST U/L  --  308* 379*  --   --    ALK PHOS U/L  --  192* 179*  --   --    ALBUMIN g/dL  --  2 1* 2 0*  --   --    TOTAL BILIRUBIN mg/dL  --  1 17* 0 69  --   --      Results from last 7 days   Lab Units 11/09/22 2215 11/09/22 0423 11/08/22  1913 11/08/22  0631   MAGNESIUM mg/dL 2 4 2 2 2 5 1 5*   PHOSPHORUS mg/dL 5 7* 3 4 3 7  --       Results from last 7 days   Lab Units 11/09/22 2215   INR  1 24*   PTT seconds 37          Results from last 7 days   Lab Units 11/10/22  0000 11/09/22 2215 11/09/22  0737 11/09/22 0423 11/09/22 0207 11/08/22 2230 11/08/22  1918   LACTIC ACID mmol/L 2 9* 5 6* 1 8 2 1* 2 6* 4 1* 3 7*     ABG:  Results from last 7 days   Lab Units 11/10/22  0215   PH ART  7 379   PCO2 ART mm Hg 40 4   PO2 ART mm Hg 155 5*   HCO3 ART mmol/L 23 3   BASE EXC ART mmol/L -1 7   ABG SOURCE  Line, Arterial     VBG:  Results from last 7 days   Lab Units 11/10/22  0215 11/08/22  2330 11/08/22  2247   PH RUMA   --   --  7 429*   PCO2 RUMA mm Hg  --   --  33 8*   PO2 RUMA mm Hg  --   --  49 3*   HCO3 RUMA mmol/L  --   --  21 9* BASE EXC RUMA mmol/L  --   --  -2 1   ABG SOURCE  Line, Arterial   < >  --     < > = values in this interval not displayed  Micro  Results from last 7 days   Lab Units 11/08/22 2230   GRAM STAIN RESULT  Gram negative rods*  Gram negative rods*     Imaging: I have personally reviewed pertinent reports  Intake and Output  I/O         11/08 0701  11/09 0700 11/09 0701  11/10 0700    P  O  360 30    I V  (mL/kg) 4286 2 (38 3) 1327 (11 8)    Blood 1000     IV Piggyback 1450 300    Total Intake(mL/kg) 7096 2 (63 4) 1657 (14 8)    Urine (mL/kg/hr) 1300 (0 5) 1535 (0 6)    Emesis/NG output  0    Total Output 1300 1535    Net +5796 2 +122                UOP: 0 6 ml/kg/hr     Height and Weights   Height: 5' 8" (172 7 cm)  IBW (Ideal Body Weight): 68 4 kg  Body mass index is 37 56 kg/m²  Weight (last 2 days)       Date/Time Weight    11/09/22 1002 112 (247)              Nutrition       Diet Orders   (From admission, onward)                 Start     Ordered    11/09/22 2232  Diet NPO  Diet effective now        References:    Nutrtion Support Algorithm Enteral vs  Parenteral   Question Answer Comment   Diet Type NPO    RD to adjust diet per protocol?  No        11/09/22 2233                    Active Medications  Scheduled Meds:  Current Facility-Administered Medications   Medication Dose Route Frequency Provider Last Rate    chlorhexidine  15 mL Mouth/Throat Q12H [facility] Alicia Faye,       ertapenem  1,000 mg Intravenous Q24H GAURANG Moraes 1,000 mg (11/09/22 1606)    fentaNYL  50 mcg/hr Intravenous Continuous Calos Gonzales PA-C 50 mcg/hr (11/10/22 0005)    fentanyl citrate (PF)  50 mcg Intravenous Q1H PRN Alicia Faye, DO      heparin (porcine)  5,000 Units Subcutaneous Q8H [facility] Da Preston,       insulin lispro  1-5 Units Subcutaneous Q6H Annabelle Huerta PA-C      iohexol  100 mL Intravenous Once in imaging Sylvie Faye, DO      labetalol  10 mg Intravenous Q4H PRN Chicho Villatoro, DO      multi-electrolyte  100 mL/hr Intravenous Continuous Obinna Loco, PA-C 100 mL/hr (11/10/22 0010)    norepinephrine           norepinephrine  1-30 mcg/min Intravenous Titrated Ariadne Huerta PA-C 7 mcg/min (11/10/22 0100)    ondansetron  4 mg Intravenous Q4H PRN Amanuel Gomez MD      pantoprazole  40 mg Intravenous Q24H [address] Da Preston, DO      propofol  5-50 mcg/kg/min Intravenous Titrated Alicia Faye, DO 30 mcg/kg/min (11/10/22 0030)     Continuous Infusions:  fentaNYL, 50 mcg/hr, Last Rate: 50 mcg/hr (11/10/22 0005)  multi-electrolyte, 100 mL/hr, Last Rate: 100 mL/hr (11/10/22 0010)  norepinephrine, 1-30 mcg/min, Last Rate: 7 mcg/min (11/10/22 0100)  propofol, 5-50 mcg/kg/min, Last Rate: 30 mcg/kg/min (11/10/22 0030)      PRN Meds:   fentanyl citrate (PF), 50 mcg, Q1H PRN  iohexol, 100 mL, Once in imaging  labetalol, 10 mg, Q4H PRN  ondansetron, 4 mg, Q4H PRN        Allergies   Allergies   Allergen Reactions    Shellfish-Derived Products - Food Allergy Anaphylaxis    Erythromycin GI Intolerance     ---------------------------------------------------------------------------------------  Advance Directive and Living Will:      Power of :    POLST:    ---------------------------------------------------------------------------------------  Care Time Delivered:   No Critical Care time spent     IRIS.TV DePope, DO      Portions of the record may have been created with voice recognition software  Occasional wrong word or "sound a like" substitutions may have occurred due to the inherent limitations of voice recognition software    Read the chart carefully and recognize, using context, where substitutions have occurred

## 2022-11-10 NOTE — PROGRESS NOTES
Cardiology Team 2 Progress Note - Judy Cervantes 61 y o  male MRN: [id number]    [facility] [id number]          Subjective:   Patient seen and examined  Patient had to be intubated overnight given respiratory distress  Patient was noted to have altered mental status, tachycardic and hypertensive upon routine turning  Patient was desaturating down to 70's on 100% NRB  Briefly on pressors, Levo max of 7 but off as of 6am      I/O 2600/1700 with net positive of +900      Hospital Course:   Judy Crevantes is a 61y o  year old male with a history of obesity, T2DM, HTN, CKD stage 3, metastatic colon cancer s/p ex lap with transverse colon resection, reverasal of colostomy, segment 3 liver resection  Cardiology was consulted for new left shoulder pain and new EKG findings noted on 11/8/22  Shoulder pain at the time was attributed to MSK and EKG changes notable for paroxsymal atrial tachycardia precipitated likely from post-surgery for which patient was initiated on beta blockers  Vitals: Blood pressure 140/85, pulse 92, temperature 97 52 °F (36 4 °C), resp  rate 16, height 5' 8" (1 727 m), weight 118 kg (260 lb 9 3 oz), SpO2 96 %  , Body mass index is 39 62 kg/m² ,   Orthostatic Blood Pressures    Flowsheet Row Most Recent Value   Blood Pressure 140/85 filed at 11/09/2022 2200   Patient Position - Orthostatic VS Lying filed at 11/08/2022 2100            Intake/Output Summary (Last 24 hours) at 11/10/2022 0813  Last data filed at 11/10/2022 0645  Gross per 24 hour   Intake 2606 94 ml   Output 1725 ml   Net 881 94 ml       Review of System:  Review of system was conducted and was negative except for as stated in the subjective course      Physical Exam:    GEN: Judy Cervantes appears ill, sedated and intubated  HEENT:  Normocephalic, atraumatic, anicteric, moist mucous membranes  NECK: No JVD or carotid bruits   HEART: reg rhythm, tachy rate, normal S1 and S2, no murmurs, clicks, gallops or rubs   LUNGS: rancorous breath sounds bilaterally with wet crackles in bilateral lower lung fields   ABDOMEN:  Normoactive bowel sounds, soft, vertical and horizontal abdominal incisions with staples  C/D/I  No fluid wave  Not tense  EXTREMITIES: peripheral pulses palpable; no edema  NEURO: sedated   SKIN:  Dry, intact, warm to touch at knees but cool at toes        Current Facility-Administered Medications:   •  chlorhexidine (PERIDEX) 0 12 % oral rinse 15 mL, 15 mL, Mouth/Throat, Q12H [address], Alicia Faye, DO, 15 mL at 11/10/22 0809  •  ertapenem (INVanz) 1,000 mg in sodium chloride 0 9 % 50 mL IVPB, 1,000 mg, Intravenous, Q24H, GAURANG Corbin, Last Rate: 100 mL/hr at 11/09/22 1606, 1,000 mg at 11/09/22 1606  •  fentaNYL 1000 mcg in sodium chloride 0 9% 100mL infusion, 50 mcg/hr, Intravenous, Continuous, Dominique Huerta PA-C, Last Rate: 5 mL/hr at 11/10/22 0005, 50 mcg/hr at 11/10/22 0005  •  fentanyl citrate (PF) 100 MCG/2ML 50 mcg, 50 mcg, Intravenous, Q1H PRN, Errol Faye DO  •  heparin (porcine) subcutaneous injection 5,000 Units, 5,000 Units, Subcutaneous, Q8H [address], Briana Bird, DO, 5,000 Units at 11/10/22 0555  •  insulin lispro (HumaLOG) 100 units/mL subcutaneous injection 1-5 Units, 1-5 Units, Subcutaneous, Q6H **AND** Fingerstick Glucose (POCT), , , TID AC, Annabelle Huerta PA-C  •  iohexol (OMNIPAQUE) 350 MG/ML injection (SINGLE-DOSE) 100 mL, 100 mL, Intravenous, Once in imaging, Errol Faye DO  •  labetalol (NORMODYNE) injection 10 mg, 10 mg, Intravenous, Q4H PRN, Jean Carlos Preston DO, 10 mg at 11/09/22 2145  •  multi-electrolyte (PLASMALYTE-A/ISOLYTE-S PH 7 4) IV solution, 100 mL/hr, Intravenous, Continuous, Dominique Huerta PA-C, Last Rate: 100 mL/hr at 11/10/22 0010, 100 mL/hr at 11/10/22 0010  •  norepinephrine (LEVOPHED) 1 mg/mL injection **ADS Override Pull**, , , ,   •  norepinephrine (LEVOPHED) 4 mg (STANDARD CONCENTRATION) IV in sodium chloride 0 9% 250 mL, 1-30 mcg/min, Intravenous, Titrated, Laury Huerta PA-C, Stopped at 11/10/22 0600  •  ondansetron (ZOFRAN) injection 4 mg, 4 mg, Intravenous, Q4H PRN, Ofe Brannon MD  •  pantoprazole (PROTONIX) injection 40 mg, 40 mg, Intravenous, Q24H [address], Brittney Pu [place], DO, 40 mg at 11/10/22 0809  •  propofol (DIPRIVAN) 1000 mg in 100 mL infusion (premix), 5-50 mcg/kg/min, Intravenous, Titrated, Alicia Faye DO, Last Rate: 16 8 mL/hr at 11/10/22 0535, 25 mcg/kg/min at 11/10/22 0535    Labs & Results:  Results from last 7 days   Lab Units 11/10/22  0215 11/10/22  0000 11/09/22  2215 11/08/22  1922   HS TNI 0HR ng/L  --   --  78*  --    HS TNI 2HR ng/L  --  111*  --   --    HS TNI 4HR ng/L 111*  --   --   --    HS TNI RAND ng/L  --   --   --  9         Results from last 7 days   Lab Units 11/10/22  0450 11/09/22 2215 11/09/22  0423   POTASSIUM mmol/L 4 3 4 7 4 0   CO2 mmol/L 23 21 23   CHLORIDE mmol/L 109* 109* 106   BUN mg/dL 44* 38* 33*   CREATININE mg/dL 2 21* 2 33* 2 13*     Results from last 7 days   Lab Units 11/10/22  0450 11/10/22  0215 11/10/22  0000 11/09/22  2215   HEMOGLOBIN g/dL 8 4* 8 5* 8 3* 9 7*   HEMATOCRIT % 24 7* 25 0* 24 4* 29 6*   PLATELETS Thousands/uL 139*  --  133* 199           Telemetry:   Personally reviewed by Emily Rodas:   11/10/22: HR are well controlled in the 90s  RBBB  (off beta blockers)  VTE Prophylaxis: Heparin      11/9/22: Fluid at b/l lung bases with blunting of coastal angles  11/10/22: Fluid at bilateral bases  Blunting of angles  +/- atelectasis  Port-A-cath noted  Assessment:  Principal Problem:    Colon cancer metastasized to liver [facility])  Active Problems:    Malignant neoplasm of transverse colon (HCC)    Encephalopathy        1  Paroxsymal Atrial Tachycardia  --> TTE (11/9/22): EF 55%  MCH  Mild TR   2  Hypertension  3   Metastatic Colon Cancer s/p Transverse Colon Resection, Reverasal of colostomy and Segment 3 liver resection with elevated subsequent Transaminases (11/8/22)  4  Diabetes Mellitus, Type 2  5  ALEXY on CKD3  --> Peak Cr 2 28 with a baseline around 1 2  6  Acute Blood Loss Anemia 2/2 LUQ Hematoma in setting of Post-Op Abdominal Surgery  --> Pre-Op Hgb around 10 and post-op low around 7 5  --> s/p 2u pRBC and 1 FFP (11/8/22)  7  Acute Hypoxic Respiratory Failure likely multifactorial in setting of volume and atelectasis given inability to use incentive spirometer in setting of recent abdominal surgery, s/p Intubation (11/9/22)  8  Sepsis 2/2 ESBL Bacteremia x2 Sets Likely in setting of recent complex abdominal surgery with translocation    Plan:  - Resume Lipitor 40mg PO QHS once able  - Resume Metoprolol Tart  25mg PO BID once able  - Start Lasix 40mg IV BID with goal of -1L Daily      Case discussed and reviewed with Dr Sergio Betancur who agrees with my assessment and plan  Thank you for involving us in the care of your patient  Hannah Enamorado MD  Cardiology Fellow PGY-6      Epic/ Allscripts/Care Everywhere records reviewed:     ** Please Note: Fluency DirectDictation voice to text software may have been used in the creation of this document   **

## 2022-11-10 NOTE — PROGRESS NOTES
Critical Care Services- Interval Progress Note   Sagrario Norman 61 y o  male MRN: [id number]  Unit/Bed#: MICU 02 Encounter: [id number]  Assessment and Plan  Interval Events:  Called to the bedside by nursing as patient with altered mental status, tachycardic, and hypertensive following a turn  Upon arrival to the room patient in respiratory distress satting in the low 70s on NRB  STAT labs sent and patient emergently intubated  • Respiratory failure with hypoxia   o Intubated, vent bundle ordered, lung protective ventilation   o Ct w/ B/L compressive atelectasis, small pleural effusions  o Likely multifactorial in the setting of atelectasis and increased metabolic demand with sepsis   o Wean vent as tolerated   o PE scan from 11/8 negative     • Abdominal distension   o No obvious collection or anastamotic leak on CT  o OGT to suction   o Serial abdominal exams   o Appreciate white surgery     • Metabolic acidosis   o Unclear etiology, possibly 2/2 ongoing sepsis with bacteremia   o Bolus 2L IVF  o Repeat endpoints   o Wean levophed as tolerated, suspect hypotension in part due to sedation   • Altered mental status   o CTH from 11/8 negative  o No observed seizure activity and no history   o Serial neuro exams  o Eventual MRI  o Appreciate neurology input      Dr Heaven Orellana updated on above events  [facility] surgery resident and chief resident at bedside, they will update surgical oncology  Patient's wife was called and updated as to above events  Upon my evaluation, this patient had a high probability of imminent or life-threatening deterioration due to respiratory distress, acidosis , which required my direct attention, intervention, and personal management  I have personally provided 45 minutes (2200 to 2300) on 11/9/22 of critical care time, exclusive of procedures, teaching, family meetings, and any prior time recorded by providers other than myself   Time includes review of laboratory data, discussion with consultants, monitoring for potential decompensation    Interventions were performed as documented above    -------------------------------------------------------------------------------------------------------------------------------------  Hemodynamic Monitoring:  Vital Signs:   HR: 108  BP: 100/50  MAP: 68  RR: 36  SpO2: 75 on 100% oxygen  Cardiac Monitoring:   Telemetry rhythm: sinus tach      Laboratory   Results from last 7 days   Lab Units 11/09/22 2215 11/09/22  1135 11/09/22 0423 11/09/22  0207 11/08/22 2230 11/08/22  1913 11/08/22  0631   WBC Thousand/uL 1 40* 19 22* 20 03*  --   --  10 68* 21 96*   HEMOGLOBIN g/dL 9 7* 9 1* 9 2* 8 8* 7 5* 8 6* 10 1*   HEMATOCRIT % 29 6* 26 7* 26 6* 25 6* 22 1* 26 6* 29 7*   PLATELETS Thousands/uL 199 146* 149  --   --  225 327   NEUTROS PCT %  --   --   --   --   --   --  88*   BANDS PCT %  --   --   --   --   --  4  --    MONOS PCT %  --   --   --   --   --   --  7   MONO PCT %  --   --   --   --   --  0*  --      Results from last 7 days   Lab Units 11/09/22 2215 11/09/22 0423 11/08/22 1913 11/08/22  0631 11/07/22  0835   SODIUM mmol/L 138 137 135 137  --    POTASSIUM mmol/L 4 7 4 0 4 5 4 9  --    CHLORIDE mmol/L 109* 106 106 108  --    CO2 mmol/L 21 23 22 21  --    CO2, I-STAT mmol/L  --   --   --   --  26   ANION GAP mmol/L 8 8 7 8  --    BUN mg/dL 38* 33* 31* 24  --    CREATININE mg/dL 2 33* 2 13* 2 28* 1 73*  --    CALCIUM mg/dL 8 9 8 5 8 1* 8 2*  --    GLUCOSE RANDOM mg/dL 167* 199* 217* 213*  --    ALT U/L  --  222* 263*  --   --    AST U/L  --  308* 379*  --   --    ALK PHOS U/L  --  192* 179*  --   --    ALBUMIN g/dL  --  2 1* 2 0*  --   --    TOTAL BILIRUBIN mg/dL  --  1 17* 0 69  --   --      Results from last 7 days   Lab Units 11/09/22  2215 11/09/22  0423 11/08/22  1913 11/08/22  0631   MAGNESIUM mg/dL 2 4 2 2 2 5 1 5*   PHOSPHORUS mg/dL 5 7* 3 4 3 7  --       Results from last 7 days   Lab Units 11/09/22  2215   INR  1 24*   PTT seconds 37          Results from last 7 days   Lab Units 11/09/22  0737 11/09/22  0423 11/09/22  0207 11/08/22  2230 11/08/22  1918   LACTIC ACID mmol/L 1 8 2 1* 2 6* 4 1* 3 7*     ABG:  Results from last 7 days   Lab Units 11/09/22 2159   PH ART  7 284*   PCO2 ART mm Hg 43 0   PO2 ART mm Hg 70 5*   HCO3 ART mmol/L 19 9*   BASE EXC ART mmol/L -6 4   ABG SOURCE  Line, Arterial     VBG:  Results from last 7 days   Lab Units 11/09/22  2159 11/08/22  2330 11/08/22  2247   PH RUMA   --   --  7 429*   PCO2 RUMA mm Hg  --   --  33 8*   PO2 RUMA mm Hg  --   --  49 3*   HCO3 RUMA mmol/L  --   --  21 9*   BASE EXC RUMA mmol/L  --   --  -2 1   ABG SOURCE  Line, Arterial   < >  --     < > = values in this interval not displayed  Micro  Results from last 7 days   Lab Units 11/08/22 2230   GRAM STAIN RESULT  Gram negative rods*  Gram negative rods*       Diagnostic Imaging / Data: I have personally reviewed pertinent reports     and I have personally reviewed pertinent films in PACS    Medications:  Current Facility-Administered Medications   Medication Dose Route Frequency   • chlorhexidine (PERIDEX) 0 12 % oral rinse 15 mL  15 mL Mouth/Throat Q12H [address]   • ertapenem (INVanz) 1,000 mg in sodium chloride 0 9 % 50 mL IVPB  1,000 mg Intravenous Q24H   • fentanyl citrate (PF) 100 MCG/2ML 50 mcg  50 mcg Intravenous Q1H PRN   • heparin (porcine) subcutaneous injection 5,000 Units  5,000 Units Subcutaneous Q8H [address]   • insulin lispro (HumaLOG) 100 units/mL subcutaneous injection 1-5 Units  1-5 Units Subcutaneous Q6H   • labetalol (NORMODYNE) injection 10 mg  10 mg Intravenous Q4H PRN   • norepinephrine (LEVOPHED) 1 mg/mL injection **ADS Override Pull**       • ondansetron (ZOFRAN) injection 4 mg  4 mg Intravenous Q4H PRN   • pantoprazole (PROTONIX) injection 40 mg  40 mg Intravenous Q24H SHANNON   • propofol (DIPRIVAN) 1000 mg in 100 mL infusion (premix)  5-50 mcg/kg/min Intravenous Titrated   • ropivacaine 0 1% and fentaNYL 2 mcg/mL PCEA   Epidural Continuous       SIGNATURE: Mariano Kennedy PA-C    Portions of the record may have been created with voice recognition software  Occasional wrong word or "sound a like" substitutions may have occurred due to the inherent limitations of voice recognition software    Read the chart carefully and recognize, using context, where substitutions have occurred

## 2022-11-10 NOTE — RESPIRATORY THERAPY NOTE
RT Ventilator Management Note  Dwayne Hodges 61 y o  male MRN: [id number]  Unit/Bed#: [facility] 02 Encounter: [id number]      Daily Screen    No data found in the last 10 encounters  Physical Exam:   Assessment Type: (P) Assess only  General Appearance: (P) Sedated  Respiratory Pattern: (P) Assisted  Chest Assessment: (P) Chest expansion symmetrical  Bilateral Breath Sounds: (P) Clear, Diminished  Cough: (P) Non-productive  Suction: (P) ET Tube  O2 Device: (P) Ventilator      Resp Comments: (P) Pt  remains on APV/CMV mode  Will continue to monitor pt per protocol

## 2022-11-10 NOTE — PROGRESS NOTES
Epidural Follow-up Note - Acute Pain Service    Merritt Whiting 61 y o  male MRN: [id number]  Unit/Bed#: MICU 02 Encounter: [id number]      Assessment:   Principal Problem:    Colon cancer metastasized to liver [facility])  Active Problems:    Malignant neoplasm of transverse colon ([facility] [address] )    Encephalopathy      Merritt Whiting is a 61y o  year old male with PMHx of metastatic colon cancer s/p exploratory laparotomy with transverse colon resection, reversal of loop colostomy, peritoneal biopsy, segment 3 liver resection on 11/07/2022  Patient received a preoperative epidural at that time, and APS was consulted for postop with epidural management  Of note, palliative Medicine is following  Overnight patient was to be tachycardic hypertensive, hypoxic, and have altered mental status after repositioning  Patient required intubation and repeat CTA demonstrated slight increase in perihepatic fluid, slightly increased size of large hematoma, and no evidence of pulmonary emboli  Today, patient remains intubated and sedation on propofol and fentanyl infusions  I discussed with the ICU team and we decided given his hemodynamic stability we would restart the epidural that was paused overnight and give a bolus with restarting in an attempt to improve pain control and facilitate extubation  Of note, WBC increased to 14 this morning and INR 1 24  We will continue to monitor with the epidural in place  Repeat cultures pending with sensitivities but patient is receiving appropriate antibiotics and infectious disease is following       Plan:  Analgesia:  - epidural bolus with 8cc lidocaine 1% with appropriate reduction in blood pressure afterwards from SBP 160s to 130s  - restart Thoracic epidural infusion of Ropivacaine 0 1% with fentanyl 2 mcg/mL at 12/5/10/3 to help facilitate extubation  - continue fentanyl 50mcg IV q1h PRN for breakthrough pain, may consider increasing as needed once extubated    Bowel Regimen:  - Bowel regimen when appropriate from surgical perspective    APS will continue to follow  Please contact Acute Pain Service - [facility] via [employer] from [phone] with additional questions or concerns  See Jhon or Kristel for additional contacts and after hours information      Pain History  Current pain location(s): unable to ascertain   Pain Scale:   unable to ascertain  Quality: unable to ascertain  24 hour history: unable to ascertain    PCEA use: prior to pause, 7 deliveries / 13 attempts  Opioid requirement previous 24 hours: epidural paused overnight, on fentanyl infusion at 50mcg/hr    Meds/Allergies   all current active meds have been reviewed and current meds:   Current Facility-Administered Medications   Medication Dose Route Frequency   • chlorhexidine (PERIDEX) 0 12 % oral rinse 15 mL  15 mL Mouth/Throat Q12H [address]   • ertapenem (INVanz) 1,000 mg in sodium chloride 0 9 % 50 mL IVPB  1,000 mg Intravenous Q24H   • fentaNYL 1000 mcg in sodium chloride 0 9% 100mL infusion  50 mcg/hr Intravenous Continuous   • fentanyl citrate (PF) 100 MCG/2ML 50 mcg  50 mcg Intravenous Q1H PRN   • heparin (porcine) subcutaneous injection 5,000 Units  5,000 Units Subcutaneous Q8H [address]   • insulin lispro (HumaLOG) 100 units/mL subcutaneous injection 2-12 Units  2-12 Units Subcutaneous Q6H [address]   • iohexol (OMNIPAQUE) 350 MG/ML injection (SINGLE-DOSE) 100 mL  100 mL Intravenous Once in imaging   • labetalol (NORMODYNE) injection 10 mg  10 mg Intravenous Q4H PRN   • multi-electrolyte (PLASMALYTE-A/ISOLYTE-S PH 7 4) IV solution  100 mL/hr Intravenous Continuous   • norepinephrine (LEVOPHED) 4 mg (STANDARD CONCENTRATION) IV in sodium chloride 0 9% 250 mL  1-30 mcg/min Intravenous Titrated   • ondansetron (ZOFRAN) injection 4 mg  4 mg Intravenous Q4H PRN   • pantoprazole (PROTONIX) injection 40 mg  40 mg Intravenous Q24H SHANNON   • propofol (DIPRIVAN) 1000 mg in 100 mL infusion (premix)  5-50 mcg/kg/min Intravenous Titrated   • ropivacaine 0 1% and fentaNYL 2 mcg/mL PCEA   Epidural Continuous       Allergies   Allergen Reactions   • Shellfish-Derived Products - Food Allergy Anaphylaxis   • Erythromycin GI Intolerance       Objective     Temp:  [97 16 °F (36 2 °C)-99 68 °F (37 6 °C)] 97 16 °F (36 2 °C)  HR:  [] 90  Resp:  [12-31] 16  BP: (116-171)/(69-88) 140/85  Arterial Line BP: ()/(40-82) 140/62  FiO2 (%):  [6-100] 40    Physical Exam  Vitals and nursing note reviewed  Constitutional:       Appearance: He is obese  He is ill-appearing  He is not diaphoretic  Comments: sedated   HENT:      Head: Normocephalic and atraumatic  Nose: Nose normal       Mouth/Throat:      Mouth: Mucous membranes are moist       Comments: ETT in place  Eyes:      Conjunctiva/sclera: Conjunctivae normal    Cardiovascular:      Rate and Rhythm: Normal rate and regular rhythm  Pulmonary:      Breath sounds: No stridor  No wheezing or rhonchi  Comments: Intubated  Abdominal:      General: Bowel sounds are normal       Palpations: Abdomen is soft  Comments: Abdominal incision c/d/i   Skin:     General: Skin is warm  Neurological:      Comments: sedated       Epidural: Site clean/dry/intact, no surrounding erythema/edema/induration, infusion functioning appropriately    Lab Results:   Results from last 7 days   Lab Units 11/10/22  0450   WBC Thousand/uL 14 02*   HEMOGLOBIN g/dL 8 4*   HEMATOCRIT % 24 7*   PLATELETS Thousands/uL 139*      Results from last 7 days   Lab Units 11/10/22  0450 11/08/22  0631 11/07/22  0835   POTASSIUM mmol/L 4 3   < >  --    CHLORIDE mmol/L 109*   < >  --    CO2 mmol/L 23   < >  --    CO2, I-STAT mmol/L  --   --  26   BUN mg/dL 44*   < >  --    CREATININE mg/dL 2 21*   < >  --    CALCIUM mg/dL 8 1*   < >  --    ALK PHOS U/L 247*   < >  --    ALT U/L 137*   < >  --    AST U/L 127*   < >  --    GLUCOSE, ISTAT mg/dl  --   --  169*    < > = values in this interval not displayed        Results from last 7 days   Lab Units 11/09/22  2215   PTT seconds 37   INR  1 24*       Imaging Studies: I have personally reviewed pertinent reports  EKG, Pathology, and Other Studies: I have personally reviewed pertinent reports  Counseling / Coordination of Care  Total floor / unit time spent today 20 minutes  Greater than 50% of total time was spent with the patient and / or family counseling and / or coordination of care  Please note that the APS provides consultative services regarding pain management only  With the exception of ketamine, peripheral nerve catheters, and epidural infusions (and except when indicated), final decisions regarding starting or changing doses of analgesic medications are at the discretion of the consulting service  Off hours consultation and/or medication management is generally not available      Ashley Win MD  Acute Pain Service

## 2022-11-10 NOTE — RESPIRATORY THERAPY NOTE
RT Ventilator Management Note  Tawny Hua 61 y o  male MRN: [id number]  [facility]: [id number]      Daily Screen         11/10/2022  0718             Patient safety screen outcome[de-identified] Failed    Not Ready for Weaning due to[de-identified] Underline problem not resolved              Physical Exam:   Assessment Type: Assess only  General Appearance: Sedated  Respiratory Pattern: Assisted  Chest Assessment: Chest expansion symmetrical  Bilateral Breath Sounds: Clear, Diminished  Cough: Non-productive  Suction: ET Tube  O2 Device: Ventilator      Resp Comments: pt currently on apv settings no sbt performed pt intuabted on  nightshift will conitnue to monitor

## 2022-11-11 LAB
ALBUMIN SERPL BCP-MCNC: 1.9 G/DL (ref 3.5–5)
ALP SERPL-CCNC: 181 U/L (ref 46–116)
ALT SERPL W P-5'-P-CCNC: 86 U/L (ref 12–78)
ANION GAP SERPL CALCULATED.3IONS-SCNC: 7 MMOL/L (ref 4–13)
AST SERPL W P-5'-P-CCNC: 68 U/L (ref 5–45)
BACTERIA BLD CULT: ABNORMAL
BACTERIA BLD CULT: ABNORMAL
BASOPHILS # BLD AUTO: 0.01 THOUSANDS/ÂΜL (ref 0–0.1)
BASOPHILS NFR BLD AUTO: 0 % (ref 0–1)
BILIRUB SERPL-MCNC: 1.01 MG/DL (ref 0.2–1)
BLACTX-M ISLT/SPM QL: DETECTED
BUN SERPL-MCNC: 40 MG/DL (ref 5–25)
CA-I BLD-SCNC: 1.07 MMOL/L (ref 1.12–1.32)
CALCIUM ALBUM COR SERPL-MCNC: 9.9 MG/DL (ref 8.3–10.1)
CALCIUM SERPL-MCNC: 8.2 MG/DL (ref 8.3–10.1)
CHLORIDE SERPL-SCNC: 112 MMOL/L (ref 96–108)
CO2 SERPL-SCNC: 23 MMOL/L (ref 21–32)
CREAT SERPL-MCNC: 1.7 MG/DL (ref 0.6–1.3)
E COLI DNA BLD POS QL NAA+NON-PROBE: DETECTED
EOSINOPHIL # BLD AUTO: 0.01 THOUSAND/ÂΜL (ref 0–0.61)
EOSINOPHIL NFR BLD AUTO: 0 % (ref 0–6)
ERYTHROCYTE [DISTWIDTH] IN BLOOD BY AUTOMATED COUNT: 13.6 % (ref 11.6–15.1)
GFR SERPL CREATININE-BSD FRML MDRD: 43 ML/MIN/1.73SQ M
GLUCOSE SERPL-MCNC: 150 MG/DL (ref 65–140)
GLUCOSE SERPL-MCNC: 156 MG/DL (ref 65–140)
GLUCOSE SERPL-MCNC: 181 MG/DL (ref 65–140)
GLUCOSE SERPL-MCNC: 187 MG/DL (ref 65–140)
GLUCOSE SERPL-MCNC: 188 MG/DL (ref 65–140)
GRAM STN SPEC: ABNORMAL
GRAM STN SPEC: ABNORMAL
HCT VFR BLD AUTO: 21.6 % (ref 36.5–49.3)
HGB BLD-MCNC: 7.4 G/DL (ref 12–17)
IMM GRANULOCYTES # BLD AUTO: 0.06 THOUSAND/UL (ref 0–0.2)
IMM GRANULOCYTES NFR BLD AUTO: 1 % (ref 0–2)
LYMPHOCYTES # BLD AUTO: 0.58 THOUSANDS/ÂΜL (ref 0.6–4.47)
LYMPHOCYTES NFR BLD AUTO: 6 % (ref 14–44)
MAGNESIUM SERPL-MCNC: 2.7 MG/DL (ref 1.6–2.6)
MCH RBC QN AUTO: 31.1 PG (ref 26.8–34.3)
MCHC RBC AUTO-ENTMCNC: 34.3 G/DL (ref 31.4–37.4)
MCV RBC AUTO: 91 FL (ref 82–98)
MONOCYTES # BLD AUTO: 0.92 THOUSAND/ÂΜL (ref 0.17–1.22)
MONOCYTES NFR BLD AUTO: 9 % (ref 4–12)
NEUTROPHILS # BLD AUTO: 8.66 THOUSANDS/ÂΜL (ref 1.85–7.62)
NEUTS SEG NFR BLD AUTO: 84 % (ref 43–75)
NRBC BLD AUTO-RTO: 0 /100 WBCS
PHOSPHATE SERPL-MCNC: 4.5 MG/DL (ref 2.7–4.5)
PLATELET # BLD AUTO: 146 THOUSANDS/UL (ref 149–390)
PMV BLD AUTO: 10.5 FL (ref 8.9–12.7)
POTASSIUM SERPL-SCNC: 4 MMOL/L (ref 3.5–5.3)
PROT SERPL-MCNC: 5.5 G/DL (ref 6.4–8.4)
RBC # BLD AUTO: 2.38 MILLION/UL (ref 3.88–5.62)
SODIUM SERPL-SCNC: 142 MMOL/L (ref 135–147)
WBC # BLD AUTO: 10.24 THOUSAND/UL (ref 4.31–10.16)

## 2022-11-11 RX ORDER — FUROSEMIDE 10 MG/ML
20 INJECTION INTRAMUSCULAR; INTRAVENOUS ONCE
Status: COMPLETED | OUTPATIENT
Start: 2022-11-11 | End: 2022-11-11

## 2022-11-11 RX ORDER — POTASSIUM CHLORIDE 14.9 MG/ML
20 INJECTION INTRAVENOUS ONCE
Status: COMPLETED | OUTPATIENT
Start: 2022-11-11 | End: 2022-11-11

## 2022-11-11 RX ORDER — AMLODIPINE BESYLATE 5 MG/1
5 TABLET ORAL 2 TIMES DAILY
Status: DISCONTINUED | OUTPATIENT
Start: 2022-11-12 | End: 2022-11-13

## 2022-11-11 RX ADMIN — INSULIN LISPRO 2 UNITS: 100 INJECTION, SOLUTION INTRAVENOUS; SUBCUTANEOUS at 00:00

## 2022-11-11 RX ADMIN — POTASSIUM CHLORIDE 20 MEQ: 14.9 INJECTION, SOLUTION INTRAVENOUS at 10:24

## 2022-11-11 RX ADMIN — HEPARIN SODIUM 5000 UNITS: 5000 INJECTION INTRAVENOUS; SUBCUTANEOUS at 15:02

## 2022-11-11 RX ADMIN — SODIUM CHLORIDE, SODIUM GLUCONATE, SODIUM ACETATE, POTASSIUM CHLORIDE, MAGNESIUM CHLORIDE, SODIUM PHOSPHATE, DIBASIC, AND POTASSIUM PHOSPHATE 100 ML/HR: .53; .5; .37; .037; .03; .012; .00082 INJECTION, SOLUTION INTRAVENOUS at 08:09

## 2022-11-11 RX ADMIN — ERTAPENEM SODIUM 1000 MG: 1 INJECTION, POWDER, LYOPHILIZED, FOR SOLUTION INTRAMUSCULAR; INTRAVENOUS at 15:02

## 2022-11-11 RX ADMIN — KETAMINE HYDROCHLORIDE 0.1 MG/KG/HR: 50 INJECTION, SOLUTION INTRAMUSCULAR; INTRAVENOUS at 18:29

## 2022-11-11 RX ADMIN — Medication 10 MG: at 06:15

## 2022-11-11 RX ADMIN — FUROSEMIDE 20 MG: 10 INJECTION, SOLUTION INTRAMUSCULAR; INTRAVENOUS at 10:30

## 2022-11-11 RX ADMIN — CHLORHEXIDINE GLUCONATE 15 ML: 1.2 SOLUTION ORAL at 08:06

## 2022-11-11 RX ADMIN — FENTANYL CITRATE: 50 INJECTION INTRAMUSCULAR; INTRAVENOUS at 22:52

## 2022-11-11 RX ADMIN — FENTANYL CITRATE 50 MCG: 50 INJECTION INTRAMUSCULAR; INTRAVENOUS at 22:52

## 2022-11-11 RX ADMIN — INSULIN LISPRO 2 UNITS: 100 INJECTION, SOLUTION INTRAVENOUS; SUBCUTANEOUS at 12:42

## 2022-11-11 RX ADMIN — HEPARIN SODIUM 5000 UNITS: 5000 INJECTION INTRAVENOUS; SUBCUTANEOUS at 22:14

## 2022-11-11 RX ADMIN — FENTANYL CITRATE 50 MCG: 50 INJECTION INTRAMUSCULAR; INTRAVENOUS at 01:50

## 2022-11-11 RX ADMIN — PANTOPRAZOLE SODIUM 40 MG: 40 INJECTION, POWDER, FOR SOLUTION INTRAVENOUS at 08:06

## 2022-11-11 RX ADMIN — Medication 10 MG: at 02:30

## 2022-11-11 RX ADMIN — PROPOFOL 10 MCG/KG/MIN: 10 INJECTION, EMULSION INTRAVENOUS at 01:55

## 2022-11-11 RX ADMIN — HEPARIN SODIUM 5000 UNITS: 5000 INJECTION INTRAVENOUS; SUBCUTANEOUS at 06:05

## 2022-11-11 RX ADMIN — FENTANYL CITRATE 50 MCG: 50 INJECTION INTRAMUSCULAR; INTRAVENOUS at 04:15

## 2022-11-11 RX ADMIN — INSULIN LISPRO 2 UNITS: 100 INJECTION, SOLUTION INTRAVENOUS; SUBCUTANEOUS at 18:15

## 2022-11-11 RX ADMIN — FENTANYL CITRATE: 50 INJECTION INTRAMUSCULAR; INTRAVENOUS at 04:20

## 2022-11-11 RX ADMIN — Medication 10 MG: at 12:43

## 2022-11-11 RX ADMIN — INSULIN LISPRO 2 UNITS: 100 INJECTION, SOLUTION INTRAVENOUS; SUBCUTANEOUS at 06:05

## 2022-11-11 NOTE — PROGRESS NOTES
Epidural Follow-up Note - Acute Pain Service    Aliza Mei 61 y o  male MRN: [id number]  [facility] [id number]      Assessment:   Principal Problem:    Colon cancer metastasized to liver [facility])  Active Problems:    Malignant neoplasm of transverse colon ([facility] [address] )    Encephalopathy      Aliza Mei is a 61y o  year old male with PMHx of metastatic colon cancer s/p exploratory laparotomy with transverse colon resection, reversal of loop colostomy, peritoneal biopsy, segment 3 liver resection on 11/07/2022  Patient received a preoperative epidural at that time, and APS was consulted for postop with epidural management  Of note, palliative Medicine is following  Pt seen and examined today, remains intubated in MICU  Sedation medicine weaned off and attempting extubation today  Epidural remains running and IV Ketamine infusion added yesterday to facilitate exutubation  Epidural site is c/d/i    Blood cultures from 11/10 remain negative, remains on antibiotics per ID  Labs improving, will check INR while epidural in place in anticipation of removal in the next few days as today is day 4 of epidural       Plan:  Analgesia:  - Continue Thoracic epidural infusion of Ropivacaine 0 1% with fentanyl 2 mcg/mL at 12/5/103  - Continue Ketamine @0 1mg/kg/hr   - can increase to 0 2mg/kg/hr if needed post-extubation  - Continue IV Fentanyl 50mcg q1hr PRN Breakthrough pain    - Anticipate epidural removal in 1-2 days   - please draw daily INR and CBC to evaluate coagulation prior to epidural removal   - if site looks clean and labs trend to wnl, can potentially keep epidural more than 5 days, will evaluate daily  - MMA when extubated    Bowel Regimen:  - Bowel regimen when appropriate from surgical perspective    APS will continue to follow  Please contact Acute Pain Service - [facility] via [employer] from [phone] with additional questions or concerns   See 4 the starsjuliette or Kristel for additional contacts and after hours information  Pain History  Current pain location(s): intubated  Pain Scale:   n/a  Quality: n/a  24 hour history: as above    PCEA use: n/a    Meds/Allergies   all current active meds have been reviewed    Allergies   Allergen Reactions   • Shellfish-Derived Products - Food Allergy Anaphylaxis   • Erythromycin GI Intolerance       Objective     Temp:  [97 52 °F (36 4 °C)-100 76 °F (38 2 °C)] 99 32 °F (37 4 °C)  HR:  [] 100  Resp:  [15-24] 24  BP: (153)/(92) 153/92  Arterial Line BP: (120-182)/(54-76) 166/68  FiO2 (%):  [40] 40    Physical Exam  Vitals and nursing note reviewed  Constitutional:       Appearance: He is ill-appearing  HENT:      Head: Normocephalic and atraumatic  Cardiovascular:      Rate and Rhythm: Normal rate  Pulmonary:      Comments: intubated  Abdominal:      General: There is no distension  Palpations: Abdomen is soft  Musculoskeletal:         General: Swelling present  Skin:     General: Skin is warm and dry  Neurological:      Comments: Sedation being weaned for extubation   Psychiatric:      Comments: n/a       Epidural: Site clean/dry/intact, no surrounding erythema/edema/induration, infusion functioning appropriately    Lab Results:   Results from last 7 days   Lab Units 11/11/22  0430   WBC Thousand/uL 10 24*   HEMOGLOBIN g/dL 7 4*   HEMATOCRIT % 21 6*   PLATELETS Thousands/uL 146*      Results from last 7 days   Lab Units 11/11/22  0430 11/08/22  0631 11/07/22  0835   POTASSIUM mmol/L 4 0   < >  --    CHLORIDE mmol/L 112*   < >  --    CO2 mmol/L 23   < >  --    CO2, I-STAT mmol/L  --   --  26   BUN mg/dL 40*   < >  --    CREATININE mg/dL 1 70*   < >  --    CALCIUM mg/dL 8 2*   < >  --    ALK PHOS U/L 181*   < >  --    ALT U/L 86*   < >  --    AST U/L 68*   < >  --    GLUCOSE, ISTAT mg/dl  --   --  169*    < > = values in this interval not displayed        Results from last 7 days   Lab Units 11/09/22  2215   PTT seconds 37   INR  1 24*       Imaging Studies: I have personally reviewed pertinent reports  EKG, Pathology, and Other Studies: I have personally reviewed pertinent reports  Counseling / Coordination of Care  Total floor / unit time spent today 20 minutes  Greater than 50% of total time was spent with the patient and / or family counseling and / or coordination of care  A description of the counseling / coordination of care: chart review, post op pain and regional/neuraxial pain management, discussion/planning with nursing/medical/surgical teams    Please note that the APS provides consultative services regarding pain management only  With the exception of ketamine, peripheral nerve catheters, and epidural infusions (and except when indicated), final decisions regarding starting or changing doses of analgesic medications are at the discretion of the consulting service  Off hours consultation and/or medication management is generally not available      Christal Rosado MD  Acute Pain Service

## 2022-11-11 NOTE — PROGRESS NOTES
Progress Note - Surgical Oncology  Felton Marx 61 y o  male MRN: [id number]  Unit/Bed#: [facility] 02 Encounter: [id number]      Assessment:  62yo M POD3 s/p exploratory laparotomy, transverse colon resection, reversal of loop colostomy, peritoneal biopsy, and segment 3 hepatic resection w/ a post-operative course complicated by acute blood loss anemia due to LUQ hematoma and recurrent acute encephalopathy and hypoxic respiratory failure requiring endotracheal intubation  Afebrile, VSS off pressors currently   T-max a 100 7° at 9:00 p m  Vent:  A PV 16/500/40%/8 of PEEP  UOP: 1 9 L  No stool recorded  WBC: 10 2 from 14  Hb 4 from 8 4     Plan:  - Continue to monitor hemoglobin for evidence of ongoing intra-abdominal hemorrhage  - Continue arterial line for monitoring of BP, vasopressors currently weaned off  - Management of E  Coli bacteremia per infectious disease, currently on Ertapenem  F/u repeat blood culture  - Wean mechanical ventilation as tolerated,   - Continue rivero catheter for monitoring of outputs  - Appreciate ICU, cardiology, and APS assistance  -appreciate neuro input  MRI brain showed no acute findings    Subjective/Objective     Subjective:   Required some prn pain control and labetalol for HTN  Febrile to 100 7 at 9 pm came down with tylenol        Objective:     Blood pressure 153/92, pulse 94, temperature 99 32 °F (37 4 °C), resp  rate 18, height 5' 8" (1 727 m), weight 121 kg (266 lb 1 5 oz), SpO2 97 %  ,Body mass index is 40 46 kg/m²  Physical Exam  Vitals reviewed  Constitutional:       Interventions: He is sedated, intubated and restrained  HENT:      Head: Normocephalic and atraumatic  Cardiovascular:      Rate and Rhythm: Normal rate  Pulmonary:      Effort: He is intubated  Abdominal:      General: There is no distension  Palpations: Abdomen is soft        Comments: Incision clean, dry and intact   Genitourinary:     Comments: Rivero in place  Musculoskeletal: Right lower leg: No edema  Left lower leg: No edema  Skin:     General: Skin is warm  Intake/Output Summary (Last 24 hours) at 11/11/2022 0606  Last data filed at 11/11/2022 0419  Gross per 24 hour   Intake 2767 65 ml   Output 1905 ml   Net 862 65 ml       Invasive Devices  Report    Central Venous Catheter Line  Duration           Port A Cath 03/10/22 Right Chest 245 days          Peripheral Intravenous Line  Duration           Peripheral IV 11/09/22 Left;Proximal;Ventral (anterior) Forearm 2 days    Peripheral IV 11/09/22 Proximal;Right;Ventral (anterior) Forearm 2 days    Peripheral IV 11/10/22 Dorsal (posterior); Left Wrist 1 day    Peripheral IV 11/10/22 Dorsal (posterior); Right Wrist 1 day          Epidural Line  Duration           Epidural Catheter 11/07/22 3 days          Arterial Line  Duration           Arterial Line 11/08/22 Radial 2 days          Drain  Duration           Urethral Catheter Latex 16 Fr  3 days    NG/OG/Enteral Tube Orogastric 16 Fr Center mouth 1 day          Airway  Duration           ETT  Cuffed 8 mm 1 day                I have personally reviewed pertinent lab results    , CBC:   Lab Results   Component Value Date    WBC 10 24 (H) 11/11/2022    HGB 7 4 (L) 11/11/2022    HCT 21 6 (L) 11/11/2022    MCV 91 11/11/2022     (L) 11/11/2022    MCH 31 1 11/11/2022    MCHC 34 3 11/11/2022    RDW 13 6 11/11/2022    MPV 10 5 11/11/2022    NRBC 0 11/11/2022   , CMP:   No results found for: SODIUM, K, CL, CO2, ANIONGAP, BUN, CREATININE, GLUCOSE, CALCIUM, AST, ALT, ALKPHOS, PROT, BILITOT, EGFR, Coagulation:   No results found for: PT, INR, APTT

## 2022-11-11 NOTE — PROGRESS NOTES
Pastoral Care Progress Note    2022  Patient: Celena Herrera : 1965  Admission Date & Time: 2022 0515  MRN: [id number] [facility]: [id number]                     Chaplaincy Interventions Utilized:   Empowerment: Clarified, confirmed, or reviewed information from treatment team , Normalized experience of patient/family, Provided anticipatory guidance, and Provided anxiety containment    Exploration: Explored hope, Explored spiritual needs & resources, and Identified, evaluated & reinforced appropriate coping strategies    Collaboration: Facilitated respect for spiritual/cultural practice during hospitalization    Relationship Building: Cultivated a relationship of care and support, Listened empathically, and Hospitality    Ritual: Provided prayer     toni Lynch  Chaplaincy Outcomes Achieved:  Expressed gratitude, Expressed intermediate hope, and Expressed peace       22 1100   Clinical Encounter Type   Visited With Patient and family together   Crisis Visit Critical Care   Referral From [address] Needs Prayer   Patient Spiritual Encounters   Spiritual Assessment 3   Suffering Severity 2   Fear Level 3   Feelings of Loneliness No   Feelings of Hopelessness No   Coping 4   Spiritual Encounter Notes Patient extubated earlier today and patient was resting comfortably with his spouse at bedside     Family Spiritual Encounters   Family Coping Anxiety;Open/discussion         Spiritual Coping Strategies Utilized:   Spiritual comfort

## 2022-11-11 NOTE — PROGRESS NOTES
Cardiology Team 2 Progress Note - Severo Herr 61 y o  male MRN: [id number]    Unit/Bed#: MICU 02 Encounter: [id number]          Subjective:   Patient seen and examined  No overnight events  Planning extubation today  Hgb continues to trend down, 7 4 today from 8 4 yesterday  I/O 3000/2100 with net positive +900cc      Hospital Course:   Severo Herr is a 61y o  year old male with a history of obesity, T2DM, HTN, CKD stage 3, metastatic colon cancer s/p ex lap with transverse colon resection, reverasal of colostomy, segment 3 liver resection  Cardiology was consulted for new left shoulder pain and new EKG findings noted on 11/8/22  Shoulder pain at the time was attributed to MSK and EKG changes notable for paroxsymal atrial tachycardia precipitated likely from post-surgery for which patient was initiated on beta blockers  Vitals: Blood pressure 153/92, pulse 90, temperature 99 32 °F (37 4 °C), resp  rate 18, height 5' 8" (1 727 m), weight 121 kg (266 lb 1 5 oz), SpO2 98 %  , Body mass index is 40 46 kg/m² ,   Orthostatic Blood Pressures    Flowsheet Row Most Recent Value   Blood Pressure 153/92 filed at 11/10/2022 2200   Patient Position - Orthostatic VS Lying filed at 11/10/2022 1600            Intake/Output Summary (Last 24 hours) at 11/11/2022 0824  Last data filed at 11/11/2022 0700  Gross per 24 hour   Intake 2778 15 ml   Output 2055 ml   Net 723 15 ml       Review of System:  Review of system was conducted and was negative except for as stated in the subjective course      Physical Exam:    GEN: Severo Herr appears ill, sedated and intubated  HEENT:  Normocephalic, atraumatic, anicteric, moist mucous membranes  NECK: No JVD or carotid bruits   HEART: reg rhythm, tachy rate, normal S1 and S2, no murmurs, clicks, gallops or rubs   LUNGS: rancorous breath sounds bilaterally with wet crackles in bilateral lower lung fields   ABDOMEN:  Normoactive bowel sounds, soft, vertical and horizontal abdominal incisions with staples  C/D/I  No fluid wave  Not tense  EXTREMITIES: peripheral pulses palpable; no edema  NEURO: sedated   SKIN:  Dry, intact, warm to touch at knees but cool at toes        Current Facility-Administered Medications:   •  acetaminophen (TYLENOL) rectal suppository 650 mg, 650 mg, Rectal, Q4H PRN, Lobo Cedeño, DO, 650 mg at 11/10/22 2000  •  chlorhexidine (PERIDEX) 0 12 % oral rinse 15 mL, 15 mL, Mouth/Throat, Q12H [address], Alicia Rodrigues Depope, DO, 15 mL at 11/11/22 0806  •  ertapenem (INVanz) 1,000 mg in sodium chloride 0 9 % 50 mL IVPB, 1,000 mg, Intravenous, Q24H, GAURANG Colin, Last Rate: 100 mL/hr at 11/10/22 1601, 1,000 mg at 11/10/22 1601  •  fentanyl citrate (PF) 100 MCG/2ML 50 mcg, 50 mcg, Intravenous, Q1H PRN, Lilia Faye, DO, 50 mcg at 11/11/22 0415  •  glycopyrrolate (ROBINUL) injection 0 2 mg, 0 2 mg, Intravenous, Q4H PRN, Curt Sanchez MD  •  haloperidol lactate (HALDOL) injection 2 mg, 2 mg, Intramuscular, Q30 Min PRN, Curt Sanchez MD  •  heparin (porcine) subcutaneous injection 5,000 Units, 5,000 Units, Subcutaneous, Q8H [address], Zeinab Randolph, DO, 5,000 Units at 11/11/22 0605  •  insulin lispro (HumaLOG) 100 units/mL subcutaneous injection 2-12 Units, 2-12 Units, Subcutaneous, Q6H [address], 2 Units at 11/11/22 0605 **AND** Fingerstick Glucose (POCT), , , Q6H, GAURANG Colin  •  iohexol (OMNIPAQUE) 350 MG/ML injection (SINGLE-DOSE) 100 mL, 100 mL, Intravenous, Once in imaging, Lilia Faye DO  •  ketamine 250 mg (STANDARD CONCENTRATION) IV in sodium chloride 0 9% 250 mL, 0 1 mg/kg/hr (Ideal), Intravenous, Continuous, Rise MD Laura, Last Rate: 6 8 mL/hr at 11/10/22 1749, 0 1 mg/kg/hr at 11/10/22 1749  •  labetalol (NORMODYNE) injection 10 mg, 10 mg, Intravenous, Q4H PRN, Zoila Preston DO, 10 mg at 11/11/22 0615  •  LORazepam (ATIVAN) injection 1 mg, 1 mg, Intravenous, Q1H PRN, Rise Laura, MD  •  multi-electrolyte (PLASMALYTE-A/ISOLYTE-S PH 7 4) IV solution, 100 mL/hr, Intravenous, Continuous, Maite Huerta PA-C, Last Rate: 100 mL/hr at 11/11/22 0809, 100 mL/hr at 11/11/22 0809  •  norepinephrine (LEVOPHED) 4 mg (STANDARD CONCENTRATION) IV in sodium chloride 0 9% 250 mL, 1-30 mcg/min, Intravenous, Titrated, Maite Huerta PA-C, Stopped at 11/10/22 0600  •  ondansetron (ZOFRAN) injection 4 mg, 4 mg, Intravenous, Q4H PRN, Gil Patel MD  •  pantoprazole (PROTONIX) injection 40 mg, 40 mg, Intravenous, Q24H [address], Jaylan Preston, , 40 mg at 11/11/22 0806  •  propofol (DIPRIVAN) 1000 mg in 100 mL infusion (premix), 5-50 mcg/kg/min, Intravenous, Titrated, Rena Faye, DO, Held at 11/11/22 6905  •  ropivacaine 0 1% and fentaNYL 2 mcg/mL PCEA, , Epidural, Continuous, Kristi Mcfarlane MD, Rate Verify at 11/11/22 0700    Labs & Results:  Results from last 7 days   Lab Units 11/10/22  0215 11/10/22  0000 11/09/22 2215 11/08/22  1922   HS TNI 0HR ng/L  --   --  78*  --    HS TNI 2HR ng/L  --  111*  --   --    HS TNI 4HR ng/L 111*  --   --   --    HS TNI RAND ng/L  --   --   --  9         Results from last 7 days   Lab Units 11/11/22  0430 11/10/22  0450 11/09/22 2215   POTASSIUM mmol/L 4 0 4 3 4 7   CO2 mmol/L 23 23 21   CHLORIDE mmol/L 112* 109* 109*   BUN mg/dL 40* 44* 38*   CREATININE mg/dL 1 70* 2 21* 2 33*     Results from last 7 days   Lab Units 11/11/22  0430 11/10/22  0450 11/10/22  0215 11/10/22  0000   HEMOGLOBIN g/dL 7 4* 8 4* 8 5* 8 3*   HEMATOCRIT % 21 6* 24 7* 25 0* 24 4*   PLATELETS Thousands/uL 146* 139*  --  133*           Telemetry:   Personally reviewed by Lily Leal:   11/10/22: HR are well controlled in the 90s  RBBB  (off beta blockers)  11/11/22: Remains in NSR with controlled HR in 90's      VTE Prophylaxis: Heparin      11/9/22: Fluid at b/l lung bases with blunting of coastal angles  11/10/22: Fluid at bilateral bases   Blunting of angles  +/- atelectasis  Port-A-cath noted  Assessment:  Principal Problem:    Colon cancer metastasized to liver [facility])  Active Problems:    Malignant neoplasm of transverse colon (HCC)    Encephalopathy        1  Paroxsymal Atrial Tachycardia  --> TTE (11/9/22): EF 55%  MCH  Mild TR   2  Hypertension  3  Metastatic Colon Cancer s/p Transverse Colon Resection, Reverasal of colostomy and Segment 3 liver resection with elevated subsequent Transaminases (11/8/22)  4  Diabetes Mellitus, Type 2  5  ALEXY on CKD3  --> Peak Cr 2 28 with a baseline around 1 2  6  Acute Blood Loss Anemia 2/2 LUQ Hematoma in setting of Post-Op Abdominal Surgery  --> Pre-Op Hgb around 10 and post-op low around 7 5  --> s/p 2u pRBC and 1 FFP (11/8/22)  7  Acute Hypoxic Respiratory Failure likely multifactorial in setting of volume and atelectasis given inability to use incentive spirometer in setting of recent abdominal surgery, s/p Intubation (11/9/22)  8  Sepsis 2/2 ESBL Bacteremia x2 Sets Likely in setting of recent complex abdominal surgery with translocation    Plan:  - Resume Lipitor 40mg PO QHS once able  - Resume Metoprolol Tart  25mg PO BID once able  - Patient still examining as volume overloaded, no Lasix was administered yesterday but would still recommend starting Lasix 40mg IV Daily  Patient is hemodynamically stable despite suspected sepsis, off vasopressors with a BP on A-Line of 170/70 mmHg  Case discussed and reviewed with Dr Anna Johnson who agrees with my assessment and plan  Thank you for involving us in the care of your patient  Taylor Nash MD  Cardiology Fellow PGY-6      Epic/ Allscripts/Care Everywhere records reviewed:     ** Please Note: Fluency DirectDictation voice to text software may have been used in the creation of this document   **

## 2022-11-11 NOTE — PROGRESS NOTES
Daily Progress Note - Critical Care   Martín Alva 61 y o  male MRN: [id number]  Unit/Bed#: MICU 02 Encounter: [id number]        ----------------------------------------------------------------------------------------  HPI/24hr events: Patient is a 76-year-old male with a significant past medical history of metastatic colon cancer, status post exploratory laparotomy transverse colon resection, reversal of loop colostomy, peritoneal biopsy, and segment 3 liver resection 11/07/2022  Patient with CT head and CTA chest abdomen pelvis on 11/08/2022 that showed hematoma with no active extravasation, resuscitated with 2 units PRBCs and 1 unit FFP  No acute events overnight  Plan to try to extubate today    ---------------------------------------------------------------------------------------  SUBJECTIVE  Patient intubated  No subjective data  Review of Systems  Review of systems was unable to be performed secondary to intubated sedated status  ---------------------------------------------------------------------------------------  Assessment and Plan:    Neuro:   · Diagnosis: Acute metabolic encephalopathy, altered mental status  ? 11/8 CT Head: negative  ? Plan:   ? Delirium precautions, Maintain sleep-wake cycle  ? Serial neuro exams  ? MRI- no acute infarct, possible chronic micro angiopathic changes  ? Neurology consult  · Diagnosis: Pain Control  ? Plan: Multimodal analgesia  ? Fentanyl, PCEA  ? APS consult- discussed epidural considering bacteremia     CV:   · Diagnosis: Hemorrhagic Shock  ? 11/8 CT c/a/p: 8cm LUQ hematoma, no active extravasation  ? Received 2 units PRBCs, 1 unit FFP  ? Plan:   ? Continue to monitor signs and symptoms bleeding  ? Trend hemoglobin  ? Transfuse for hemoglobin less than 7  ? Maintain map greater than 65  ? Levophed as necessary  · Diagnosis: Paroxysmal atrial tachycardia  ? Trops negative  ? EKG no STEMI or acute changes  ?  Plan:   ? Continue cardiac monitoring  ? Cardiology consult  ? Follow-up echo  · Diagnosis: HTN  ? Plan:   ? Continue to hold home lisinopril and amlodipine  ? Labetalol prn for SBP >160  · Diagnosis: HLD  ? Plan: hold home lipitor while intubated/NPO  ? Can resume post extubation     Pulm:  · Diagnosis: Acute hypoxic respiratory failure, Intubated  ? 11/8 CT:  Pulmonary edema, bilateral pleural effusions  ? 11/9 CT:  No pulmonary emboli, bilateral pleural effusions/atelectasis  ? Plan:   ? Intubated, vent bundle ordered  ? Maintain SpO2 greater than 92%  ? Wean vent as tolerated, SBT daily  ? Plan to extubate today     GI:   · Diagnosis:  Metastatic lung cancer  ? Status post ex lap, transverse colon resection, loop colostomy reversal, peritoneal biopsy, segment 3 liver resection 11/07/2022  ? Plan:  Per Surgical Oncology  ? Serial abdominal examinations  · Diagnosis:  Transaminitis, secondary to liver section  ? Plan:  Continue to monitor LFTs  · Diagnosis:  GERD  ? Plan:  Continue Protonix     :   · Diagnosis:  Acute kidney injury  ? Plan:   ? Monitor BUN/creatinine  ? Strict I&Os  ? Continue Negro  ? Consider Lasix as needed     F/E/N:   · F: Isolyte @ 100  · E: Replace to goal  · N: NPO     Heme/Onc:   · Diagnosis:  Acute blood loss anemia  ? Plan:   ? Continue to monitor hemoglobin  ? Monitor for signs and symptoms of bleeding  ? Transfuse for hemoglobin less than 7     Endo:   · Diagnosis:  Type 2 diabetes mellitus  ? Plan:   ? Endocrinology following  ? Blood sugar goal 140-180  ? ISS, Q6h glucose checks     ID:   · Diagnosis: SIRS  ? 11/8 Blood Cultures: Gram negative rods, E  Coli ( ESBL)   ? Plan:   ? Ertapenem IV  ? Follow cultures  ? Monitor WBC and fever curve     MSK/Skin:   · Diagnosis: Surgical Wound  ? Plan:  Per Surgical Oncology  · Diagnosis:  No acute issues  ?  Plan:  PT/OT when able, frequent turning/repositioning    Patient appropriate for transfer out of the ICU today?: No  Disposition: Continue Critical Care   Code Status: Level 1 - Full Code  ---------------------------------------------------------------------------------------  ICU CORE MEASURES    Prophylaxis   VTE Pharmacologic Prophylaxis: Heparin  VTE Mechanical Prophylaxis: sequential compression device  Stress Ulcer Prophylaxis: Pantoprazole IV     ABCDE Protocol (if indicated)  Plan to perform spontaneous awakening trial today? Yes  Plan to perform spontaneous breathing trial today? Yes  Obvious barriers to extubation? No      Invasive Devices Review  Invasive Devices  Report    Central Venous Catheter Line  Duration           Port A Cath 03/10/22 Right Chest 245 days          Peripheral Intravenous Line  Duration           Peripheral IV 22 Left;Proximal;Ventral (anterior) Forearm 2 days    Peripheral IV 22 Proximal;Right;Ventral (anterior) Forearm 2 days    Peripheral IV 11/10/22 Dorsal (posterior); Left Wrist 1 day    Peripheral IV 11/10/22 Dorsal (posterior); Right Wrist 1 day          Epidural Line  Duration           Epidural Catheter 22 3 days          Arterial Line  Duration           Arterial Line 22 Radial 2 days          Drain  Duration           Urethral Catheter Latex 16 Fr  3 days    NG/OG/Enteral Tube Orogastric 16 Fr Center mouth 1 day          Airway  Duration           ETT  Cuffed 8 mm 1 day              Can any invasive devices be discontinued today?  Yes  ---------------------------------------------------------------------------------------  OBJECTIVE    Vitals   Vitals:    22 0300 22 0400 22 0430 22 0500   BP:       BP Location:       Pulse: 90 92 94 94   Resp: 17 19 16 18   Temp: 98 6 °F (37 °C) 98 96 °F (37 2 °C) 99 3 °F (37 4 °C) 99 32 °F (37 4 °C)   TempSrc:  Esophageal     SpO2: 98% 97% 97% 97%   Weight:  121 kg (266 lb 1 5 oz)     Height:         Temp (24hrs), Av 8 °F (37 1 °C), Min:97 16 °F (36 2 °C), Max:100 76 °F (38 2 °C)  Current: Temperature: 99 32 °F (37 4 °C)    Respiratory:  SpO2: SpO2: 97 %  Nasal Cannula O2 Flow Rate (L/min): 15 L/min    Invasive/non-invasive ventilation settings   Respiratory  Report   Lab Data (Last 4 hours)    None         O2/Vent Data (Last 4 hours)      11/11 0244          Insp Time (sec) (sec) 1                   Physical Exam  Constitutional:       Comments: Intubated, opens eyes to voice, follows simple commands   HENT:      Head: Normocephalic  Eyes:      Extraocular Movements: Extraocular movements intact  Cardiovascular:      Pulses: Normal pulses  Pulmonary:      Comments: On ventilator  Abdominal:      General: There is no distension  Musculoskeletal:         General: No deformity  Neurological:      Comments: sedated             Laboratory and Diagnostics:  Results from last 7 days   Lab Units 11/11/22  0430 11/10/22  0450 11/10/22  0215 11/10/22  0000 11/09/22 2215 11/09/22  1135 11/09/22 0423 11/08/22 2230 11/08/22 1913 11/08/22  0631   WBC Thousand/uL 10 24* 14 02*  --  9 18 1 40* 19 22* 20 03*  --  10 68* 21 96*   HEMOGLOBIN g/dL 7 4* 8 4* 8 5* 8 3* 9 7* 9 1* 9 2*   < > 8 6* 10 1*   HEMATOCRIT % 21 6* 24 7* 25 0* 24 4* 29 6* 26 7* 26 6*   < > 26 6* 29 7*   PLATELETS Thousands/uL 146* 139*  --  133* 199 146* 149  --  225 327   NEUTROS PCT % 84*  --   --   --   --   --   --   --   --  88*   BANDS PCT %  --   --   --   --  6  --   --   --  4  --    MONOS PCT % 9  --   --   --   --   --   --   --   --  7   MONO PCT %  --   --   --   --  0*  --   --   --  0*  --     < > = values in this interval not displayed       Results from last 7 days   Lab Units 11/11/22  0430 11/10/22  0450 11/09/22  2215 11/09/22  0423 11/08/22  1913 11/08/22  0631 11/07/22  0835   SODIUM mmol/L 142 140 138 137 135 137  --    POTASSIUM mmol/L 4 0 4 3 4 7 4 0 4 5 4 9  --    CHLORIDE mmol/L 112* 109* 109* 106 106 108  --    CO2 mmol/L 23 23 21 23 22 21  --    CO2, I-STAT mmol/L  --   --   --   --   --   --  26   ANION GAP mmol/L 7 8 8 8 7 8  --    BUN mg/dL 40* 44* 38* 33* 31* 24  --    CREATININE mg/dL 1 70* 2 21* 2 33* 2 13* 2 28* 1 73*  --    CALCIUM mg/dL 8 2* 8 1* 8 9 8 5 8 1* 8 2*  --    GLUCOSE RANDOM mg/dL 156* 175* 167* 199* 217* 213*  --    ALT U/L 86* 137*  --  222* 263*  --   --    AST U/L 68* 127*  --  308* 379*  --   --    ALK PHOS U/L 181* 247*  --  192* 179*  --   --    ALBUMIN g/dL 1 9* 1 7*  --  2 1* 2 0*  --   --    TOTAL BILIRUBIN mg/dL 1 01* 0 93  --  1 17* 0 69  --   --      Results from last 7 days   Lab Units 11/11/22  0430 11/10/22  0450 11/09/22  2215 11/09/22  0423 11/08/22  1913 11/08/22  0631   MAGNESIUM mg/dL 2 7* 2 5 2 4 2 2 2 5 1 5*   PHOSPHORUS mg/dL 4 5 5 0* 5 7* 3 4 3 7  --       Results from last 7 days   Lab Units 11/09/22  2215   INR  1 24*   PTT seconds 37          Results from last 7 days   Lab Units 11/10/22  0450 11/10/22  0215 11/10/22  0000 11/09/22  2215 11/09/22  0737 11/09/22  0423 11/09/22  0207   LACTIC ACID mmol/L 2 0 2 4* 2 9* 5 6* 1 8 2 1* 2 6*     ABG:  Results from last 7 days   Lab Units 11/10/22  0700   PH ART  7 393   PCO2 ART mm Hg 35 2*   PO2 ART mm Hg 96 7   HCO3 ART mmol/L 21 0*   BASE EXC ART mmol/L -3 5   ABG SOURCE  Line, Arterial     VBG:  Results from last 7 days   Lab Units 11/10/22  0700 11/08/22  2330 11/08/22  2247   PH RUMA   --   --  7 429*   PCO2 RUMA mm Hg  --   --  33 8*   PO2 RUMA mm Hg  --   --  49 3*   HCO3 RUMA mmol/L  --   --  21 9*   BASE EXC RUMA mmol/L  --   --  -2 1   ABG SOURCE  Line, Arterial   < >  --     < > = values in this interval not displayed  Micro  Results from last 7 days   Lab Units 11/10/22  0450 11/08/22  2230   BLOOD CULTURE  Received in Microbiology Lab  Culture in Progress  Received in Microbiology Lab  Culture in Progress   Escherichia coli*  Escherichia coli ESBL*   GRAM STAIN RESULT   --  Gram negative rods*  Gram negative rods*       Imaging:  I have personally reviewed pertinent films in PACS    Intake and Output  I/O       11/09 0701  11/10 0700 11/10 0701  11/11 0700 P O  30 0    I V  (mL/kg) 2176 9 (18 4) 2367 7 (19 6)    NG/GT  0    IV Piggyback 400 300    Total Intake(mL/kg) 2606 9 (22 1) 2667 7 (22)    Urine (mL/kg/hr) 1725 (0 6) 1905 (0 7)    Emesis/NG output 0 0    Stool  0    Total Output 1725 1905    Net +881 9 +762 7          Unmeasured Stool Occurrence  0 x           Height and Weights   Height: 5' 8" (172 7 cm)  IBW (Ideal Body Weight): 68 4 kg  Body mass index is 40 46 kg/m²  Weight (last 2 days)     Date/Time Weight    11/11/22 0400 121 (266 1)    11/10/22 0400 118 (260 58)    11/09/22 1002 112 (247)            Nutrition       Diet Orders   (From admission, onward)             Start     Ordered    11/09/22 2232  Diet NPO  Diet effective now        References:    Nutrtion Support Algorithm Enteral vs  Parenteral   Question Answer Comment   Diet Type NPO    RD to adjust diet per protocol?  No        11/09/22 2233                  Active Medications  Scheduled Meds:  Current Facility-Administered Medications   Medication Dose Route Frequency Provider Last Rate   • acetaminophen  650 mg Rectal Q4H PRN Mode Camara DO     • chlorhexidine  15 mL Mouth/Throat Q12H [facility] Yunier, DO     • ertapenem  1,000 mg Intravenous Q24H GAURANG Bach 1,000 mg (11/10/22 1601)   • fentanyl citrate (PF)  50 mcg Intravenous Q1H PRN Adan Faye DO     • glycopyrrolate  0 2 mg Intravenous Q4H PRN Richmond Cueto MD     • haloperidol lactate  2 mg Intramuscular Q30 Min PRN Richmond Cueto MD     • heparin (porcine)  5,000 Units Subcutaneous [facility] Preston DO     • insulin lispro  2-12 Units Subcutaneous Q6H [address] GAURANG Corbin     • iohexol  100 mL Intravenous Once in imaging Niño Countess, DO     • ketamine  0 1 mg/kg/hr (Ideal) Intravenous Continuous Rcihmond Cueto MD 0 1 mg/kg/hr (11/10/22 3989)   • labetalol  10 mg Intravenous Q4H PRN Mary Preston,      • LORazepam  1 mg Intravenous Q1H PRN Anyi Whitley MD     • multi-electrolyte  100 mL/hr Intravenous Continuous Sanya Thomas PA-C 100 mL/hr (11/10/22 2330)   • norepinephrine  1-30 mcg/min Intravenous Titrated Sanya Thomas PA-C Stopped (11/10/22 0600)   • ondansetron  4 mg Intravenous Q4H PRN Avelino Peabody, MD     • pantoprazole  40 mg Intravenous Q24H [address] Da Preston DO     • propofol  5-50 mcg/kg/min Intravenous Titrated [place] Jennings Depope, DO 10 mcg/kg/min (11/11/22 0155)   • ropivacaine 0 1% and fentaNYL 2 mcg/mL   Epidural Continuous Anyi Whitley MD       Continuous Infusions:  ketamine, 0 1 mg/kg/hr (Ideal), Last Rate: 0 1 mg/kg/hr (11/10/22 1749)  multi-electrolyte, 100 mL/hr, Last Rate: 100 mL/hr (11/10/22 2330)  norepinephrine, 1-30 mcg/min, Last Rate: Stopped (11/10/22 0600)  propofol, 5-50 mcg/kg/min, Last Rate: 10 mcg/kg/min (11/11/22 0155)  ropivacaine 0 1% and fentaNYL 2 mcg/mL,       PRN Meds:   acetaminophen, 650 mg, Q4H PRN  fentanyl citrate (PF), 50 mcg, Q1H PRN  glycopyrrolate, 0 2 mg, Q4H PRN  haloperidol lactate, 2 mg, Q30 Min PRN  iohexol, 100 mL, Once in imaging  labetalol, 10 mg, Q4H PRN  LORazepam, 1 mg, Q1H PRN  ondansetron, 4 mg, Q4H PRN        Allergies   Allergies   Allergen Reactions   • Shellfish-Derived Products - Food Allergy Anaphylaxis   • Erythromycin GI Intolerance     ---------------------------------------------------------------------------------------  Care Time Delivered:   Upon my evaluation, this patient had a high probability of imminent or life-threatening deterioration due to bacteremia, which required my direct attention, intervention, and personal management  I have personally provided 15 minutes (10min to 20min) of critical care time, exclusive of procedures, teaching, family meetings, and any prior time recorded by providers other than myself  Dimple Cano MD      Portions of the record may have been created with voice recognition software  Occasional wrong word or "sound a like" substitutions may have occurred due to the inherent limitations of voice recognition software    Read the chart carefully and recognize, using context, where substitutions have occurred

## 2022-11-11 NOTE — CASE MANAGEMENT
Case Management Discharge Planning Note    Patient name Bevely Guardian  Location MICU 02/MICU 02 MRN [id number]  : 1965 Date 2022       Current Admission Date: 2022  Current Admission Diagnosis:Colon cancer metastasized to liver [facility])   Patient Active Problem List    Diagnosis Date Noted   • Encephalopathy 2022   • Cervical radiculopathy 10/26/2022   • Other fatigue 06/15/2022   • Colostomy prolapse ([facility] ) 2022   • Colon cancer metastasized to liver ([facility] ) 2022   • 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 liver ([facility] ) 2022   • Iron deficiency anemia, unspecified 2022   • Malignant neoplasm of transverse colon ([facility] ) 2022   • Thrombocytosis 2022   • Hypokalemia 2022   • Transaminitis 2022   • Type 2 diabetes mellitus with hyperlipidemia ([facility] ) 2022   • Colonic mass 2022   • Microcytic anemia 2022   • Left ureteral calculus 2020   • Incarcerated umbilical hernia 15/95/8105   • Testicular hypogonadism 2017   • Low testosterone 2017   • Type 2 diabetes mellitus without complication, with long-term current use of insulin ([facility] ) 2016   • Benign essential hypertension 2016   • Mixed hyperlipidemia 2016   • Erectile dysfunction 2016   • Obesity (BMI 30-39 9) 2016      LOS (days): 4  Geometric Mean LOS (GMLOS) (days): 5 30  Days to GMLOS:1 2     OBJECTIVE:  Risk of Unplanned Readmission Score: 36 18         Current admission status: Inpatient   Preferred Pharmacy:   [address] [place] [address] [address] - [address] [place] [address]  Phone: [phone] Fax: 411 [facility], [address]  [address] [facility] [place] [address]  [address] [place] [address] [address] [place] [address]  Phone: [phone] Fax: [phone]    [facility] - [address] [facility] [place] [address]  Phone: [phone] Fax: [phone]    Primary Care Provider: Demarcus Ramos MD    [facility]: [facility]  Secondary Insurance:     DISCHARGE DETAILS:                       Additional Comments: Patient not medically cleared for discharge  CM will continue to follow for d/c needs

## 2022-11-11 NOTE — RESPIRATORY THERAPY NOTE
RT Ventilator Management Note  Savage Holloway 61 y o  male MRN: [id number]  Unit/Bed#: MICU 02 Encounter: [id number]      Daily Screen         11/10/2022  0718             Patient safety screen outcome[de-identified] Failed    Not Ready for Weaning due to[de-identified] Underline problem not resolved              Physical Exam:   Assessment Type: (P) Assess only  General Appearance: (P) Sedated  Respiratory Pattern: (P) Assisted  Chest Assessment: (P) Chest expansion symmetrical  Bilateral Breath Sounds: (P) Clear  Cough: (P) None  Suction: (P) ET Tube  O2 Device: (P) Ventilator      Resp Comments: (P) Pt  remains on APV/CMV mode  No changes at this time  Will continue to assess and monitor pt per protocol

## 2022-11-11 NOTE — PROGRESS NOTES
[facility] Neurology Progress note    Name: Rd Quintanilla   Age & Sex: 61 y o  male   MRN: [id number]  Unit/Bed#: MICU 02   Encounter: [id number]    Recommendations for outpatient neurological follow up have yet to be determined  Pending for discharge:  Requiring ICU level care, intubated, sedated  Assessment plan:    Encephalopathy  Assessment & Plan  60 yo male noted to be increasingly encephalopathic in setting of recent procedures due to metastatic colon cancer that eventually led to an episode of hypotension, tachycardia with discovery of LUQ hematoma requiring blood transfusion  Initial CTH negative  On 11/09, neurologic exam showed right visual field deficit, however, no significant mental status deficit  It appears that patient's symptoms may be due to metabolic toxic etiologies, but ischemic brain injury cannot be entirely excluded given visual field deficit on exam and prior episodes of hypotension  However, given the overnight events of 11/9-11/10, it appears that patient may have sustained hypoxic brain injury  Given limited neurologic exam this morning, imaging was required to properly assess possible CNS damage, however, fortunately, MRI brain is unremarkable for mets/ischemia  Patient's symptoms are most likely due to acute toxic metabolic etiologies given the events of the last few days and his hospital course so far  He may also have underlying depression that may be contributing to his mental status  Plan:  · No further inpatient neurology recommendations for now  · On ertapenem for gram-negative bacteremia, will defer antibiotic management to primary/ID team  · Rest of care as per primary team    Subjective:     Patient seen and examined at bedside  Overnight, patient remained intubated, sedated  He did have low-grade fevers throughout the night  He is no longer requiring vasopressor support and opened eyes to command  Unable to perform ROS      Review of Systems  ROS n/a     Objective:     Patient ID: Karlee Moreau is a 61 y o  male  Vitals:    22 1400 22 1430 22 1544 22 1600   BP:   154/82 154/80   BP Location:    Left arm   Pulse: 100 100 102 102   Resp: 21 (!) 27 (!) 27 (!) 24   Temp:    98 8 °F (37 1 °C)   TempSrc:    Oral   SpO2: 94% 94% 93% 93%   Weight:       Height:          Temperature:   Temp (24hrs), Av 4 °F (37 4 °C), Min:98 1 °F (36 7 °C), Max:100 76 °F (38 2 °C)    Temperature: 98 8 °F (37 1 °C)    Physical exam:  General, intubated, sedated  CV: S1, S2 noted     Neurological Exam:  Mental status:  Patient responds to auditory stimuli, opens eyes upon hearing his name  Follows simple commands  Able to track examiner  Does respond to visual threat b/l  Does withdraw to pressure, noxious stimuli in all 4 extremities  Level of consciousness is stuporous     Cranial nerves:  Pupils are responsive, shape and size are equal   Occulocephalic reflex is normal  Grimace to pain is absent in all 4 extremities  Gag present, cough present  Corneal reflexes present bilaterally       Motor function:   Tone is: normal   Muscle bulk is: normal   Spontaneous movements in all 4 extremities? None      Reflexes:  Trace reflexes throughout bilaterally (biceps, triceps, patellar)  Clonus: None  Plantar mute b/l      Labs: I have personally reviewed pertinent reports      Results from last 7 days   Lab Units 22  0430 11/10/22  0450 11/10/22  0215 11/10/22  0000 22  2215 22  2230 22  1913 22  0631   WBC Thousand/uL 10 24* 14 02*  --  9 18 1 40*   < > 10 68* 21 96*   HEMOGLOBIN g/dL 7 4* 8 4* 8 5* 8 3* 9 7*   < > 8 6* 10 1*   HEMATOCRIT % 21 6* 24 7* 25 0* 24 4* 29 6*   < > 26 6* 29 7*   PLATELETS Thousands/uL 146* 139*  --  133* 199   < > 225 327   NEUTROS PCT % 84*  --   --   --   --   --   --  88*   MONOS PCT % 9  --   --   --   --   --   --  7   MONO PCT %  --   --   --   --  0*  --  0*  --     < > = values in this interval not displayed  Results from last 7 days   Lab Units 11/11/22  0430 11/10/22  0450 11/09/22  2215 11/09/22  0423 11/08/22  0631 11/07/22  0835   SODIUM mmol/L 142 140 138 137   < >  --    POTASSIUM mmol/L 4 0 4 3 4 7 4 0   < >  --    CHLORIDE mmol/L 112* 109* 109* 106   < >  --    CO2 mmol/L 23 23 21 23   < >  --    CO2, I-STAT mmol/L  --   --   --   --   --  26   BUN mg/dL 40* 44* 38* 33*   < >  --    CREATININE mg/dL 1 70* 2 21* 2 33* 2 13*   < >  --    CALCIUM mg/dL 8 2* 8 1* 8 9 8 5   < >  --    ALK PHOS U/L 181* 247*  --  192*   < >  --    ALT U/L 86* 137*  --  222*   < >  --    AST U/L 68* 127*  --  308*   < >  --    GLUCOSE, ISTAT mg/dl  --   --   --   --   --  169*    < > = values in this interval not displayed  Results from last 7 days   Lab Units 11/11/22  0430 11/10/22  0450 11/09/22  2215   MAGNESIUM mg/dL 2 7* 2 5 2 4     Results from last 7 days   Lab Units 11/11/22  0430 11/10/22  0450 11/09/22  2215   PHOSPHORUS mg/dL 4 5 5 0* 5 7*      Results from last 7 days   Lab Units 11/09/22  2215   INR  1 24*   PTT seconds 37     Results from last 7 days   Lab Units 11/10/22  0450   LACTIC ACID mmol/L 2 0         Imaging:   Radiology Results: I have personally reviewed pertinent reports  and I have personally reviewed pertinent films in PACS    MRI brain wo contrast   Final Result by Zenovia Dakins, MD (11/11 6718)      No acute infarct, hemorrhage, or midline shift  Few scattered foci of bifrontoparietal predominant T2 FLAIR signal abnormality, nonspecific  Differential includes chronic microangiopathic changes, sequela of chronic migraine headache and/or remote infectious versus inflammatory process  Suspect left maxillary sinus odontogenic keratocyst       Workstation performed: [id number]         XR chest portable   Final Result by Timothy Hercules MD (12/09 8607)      Improvement of CHF since 11/9/2022  Tip of ET tube 3 cm above the jeremiah  Workstation performed: [id number]         CTA chest abdomen pelvis w wo contrast   Final Result by Fransico Galloway MD (11/10 1056)   Chest:   1  Small bilateral pleural effusions with presumed bibasilar atelectasis  2   Mild pulmonary edema limits evaluation of known small pulmonary nodules  Abdomen and pelvis:   1  Hematoma in the perisplenic region/gastrosplenic ligament is similar in volume to the study 15 hours prior  There is no active bleeding  2   Lack of oral contrast limits evaluation for anastomotic leak, as queried  Anastomosis appears similar to the recent prior study  There is no collection surrounding the anastomosis  3   Expected postsurgical changes related to segment 3 hepatectomy  Unchanged hepatic metastases  Preliminary findings provided by [facility]  Final report is congruent  Workstation performed: [id number]         XR chest portable   Final Result by Karishma Flores MD (11/09 5174)      1  Pulmonary edema and trace bilateral pleural effusions  2   Retrocardiac opacification and medial right lower lung zone opacification, likely due to atelectasis and/or pleural effusions  Workstation performed: [id number]         CTA chest (pe study) abdomen pelvis routine contrast   Final Result by Raul Haskins MD (11/08 2136)      1  No pulmonary embolism  2   Acute hemorrhage in the left upper abdomen close to the anastomosis  3   Mild wall thickening and inflammation of the cecum is nonspecific, though can represent postoperative change  4   Pulmonary edema with moderate sized bilateral pleural effusions  5   Left retrocardiac air and fluid is new since prior study and may represent fluid which extended through the esophageal hiatus  There is no rim enhancement to suggest abscess formation at this time            I personally discussed this study with Sathya Rowell on 11/8/2022 at 8:50 PM and 9:35 PM          Workstation performed: [id number]         CT head wo contrast   Final Result by Luis Angel Clement MD (11/08 2118)      No acute territorial infarct, hemorrhage, or midline shift  Workstation performed: [id number]         XR chest portable   Final Result by Jose Rafael Klein MD (11/09 2658)      Pulmonary edema  Workstation performed: [id number]             Other Diagnostic Testing: I have personally reviewed pertinent reports        Active Medications:     Current Facility-Administered Medications   Medication Dose Route Frequency   • acetaminophen (TYLENOL) rectal suppository 650 mg  650 mg Rectal Q4H PRN   • [START ON 11/12/2022] amLODIPine (NORVASC) tablet 5 mg  5 mg Oral BID   • chlorhexidine (PERIDEX) 0 12 % oral rinse 15 mL  15 mL Mouth/Throat Q12H [address]   • ertapenem (INVanz) 1,000 mg in sodium chloride 0 9 % 50 mL IVPB  1,000 mg Intravenous Q24H   • fentanyl citrate (PF) 100 MCG/2ML 50 mcg  50 mcg Intravenous Q1H PRN   • glycopyrrolate (ROBINUL) injection 0 2 mg  0 2 mg Intravenous Q4H PRN   • haloperidol lactate (HALDOL) injection 2 mg  2 mg Intramuscular Q30 Min PRN   • heparin (porcine) subcutaneous injection 5,000 Units  5,000 Units Subcutaneous Q8H [address]   • insulin lispro (HumaLOG) 100 units/mL subcutaneous injection 2-12 Units  2-12 Units Subcutaneous Q6H [address]   • iohexol (OMNIPAQUE) 350 MG/ML injection (SINGLE-DOSE) 100 mL  100 mL Intravenous Once in imaging   • ketamine 250 mg (STANDARD CONCENTRATION) IV in sodium chloride 0 9% 250 mL  0 1 mg/kg/hr (Ideal) Intravenous Continuous   • labetalol (NORMODYNE) injection 10 mg  10 mg Intravenous Q4H PRN   • LORazepam (ATIVAN) injection 1 mg  1 mg Intravenous Q1H PRN   • ondansetron (ZOFRAN) injection 4 mg  4 mg Intravenous Q4H PRN   • pantoprazole (PROTONIX) injection 40 mg  40 mg Intravenous Q24H SHANNON   • phenol (CHLORASEPTIC) 1 4 % mucosal liquid 1 spray  1 spray Mouth/Throat Q2H PRN   • ropivacaine 0 1% and fentaNYL 2 mcg/mL PCEA   Epidural Continuous Prior to Admission medications    Medication Sig Start Date End Date Taking? Authorizing Provider   amLODIPine (NORVASC) 5 mg tablet TAKE 1 TABLET BY MOUTH TWICE A DAY 6/30/22  Yes Rocio Sepulveda MD   aspirin 81 MG tablet Take 81 mg by mouth daily  Yes Historical Provider, MD   atorvastatin (LIPITOR) 40 mg tablet TAKE 1 TABLET BY MOUTH EVERY DAY 6/30/22  Yes Rocio Sepulveda MD   Cholecalciferol (VITAMIN D3 PO) Take 400 Units by mouth daily   Yes Historical Provider, MD   DULoxetine (Cymbalta) 30 mg delayed release capsule Take 1 capsule (30 mg total) by mouth daily for 7 days, THEN 2 capsules (60 mg total) daily for 21 days   10/21/22 11/18/22 Yes José Miguel Felix MD   glyBURIDE-metFORMIN (GLUCOVANCE) 5-500 MG per tablet Take 1 tablet by mouth daily with breakfast Taking 1 tablet in the morning    Yes Historical Provider, MD   Januvia 100 MG tablet TAKE 1 TABLET BY MOUTH EVERY DAY 3/11/22  Yes Rocio Sepulveda MD   Lantus SoloStar 100 units/mL SOPN INJECT 30 UNITS UNDER THE SKIN DAILY  Patient taking differently: Inject 35 Units under the skin daily 9/29/22  Yes Rocio Sepulveda MD   lisinopril (ZESTRIL) 40 mg tablet TAKE 1 TABLET BY MOUTH EVERY DAY 3/11/22  Yes Rocio Sepulveda MD   methocarbamol (ROBAXIN) 500 mg tablet Take 1 tablet (500 mg total) by mouth 2 (two) times a day 10/26/22   Rocio Sepulveda MD   metoclopramide (REGLAN) 10 mg tablet TAKE 1 TABLET BY MOUTH TWICE A DAY 9/5/22  Yes Rocio Sepulveda MD   Multiple Vitamins-Minerals (multivitamin with minerals) tablet Take 1 tablet by mouth daily   Yes Historical Provider, MD   pantoprazole (PROTONIX) 40 mg tablet TAKE 1 TABLET BY MOUTH TWICE A DAY 5/8/22  Yes Rocio Sepulveda MD   Continuous Blood Gluc Sensor (FreeStyle Parrish 14 Day Sensor) MISC Use 1 each every 14 (fourteen) days 3/11/22   GAURANG Orlando   Insulin Pen Needle (B-D UF III MINI PEN NEEDLES) 31G X 5 MM MISC Inject under the skin daily 5/13/22   Rocio Sepulveda MD   Needle, Disp, (HYPODERMIC NEEDLE) 23G X 1-1/2" MISC by Does not apply route once a week 5/10/18   Nataliya Cai PA-C   ondansetron [facility]) 4 mg tablet Take 4 mg by mouth every 6 (six) hours as needed 1/10/22   Historical Provider, MD   Ostomy Supplies MISC Use in the morning 2/23/22   Shlomo Fitzgerald MD     VTE Pharmacologic Prophylaxis: Heparin  VTE Mechanical Prophylaxis: sequential compression device    ==  MD [facility], PGY-II

## 2022-11-11 NOTE — PROGRESS NOTES
Progress Note - Infectious Disease   Rd Quintanilla 61 y o  male MRN: [id number]  Unit/Bed#: MICU 02 Encounter: [id number]      Impression/Plan:  1  Sepsis-evolving since admission   Appears to be secondary to ESBL E coli bacteremia in the setting of recent complex surgical intervention in the intra-abdominal space   Patient has been resuscitated for ongoing hemorrhage and is clinically improved and seems to be tolerating the antibiotics without difficulty   He is not requiring any vasopressor support  His white count had increased and now decreased  Still with intermittent low-grade fever  -continue ertapenem  -recheck CBC with diff and BMP  -follow up repeat blood cultures  -supportive care in the intensive care unit  -no additional ID workup for now  -if patient would begin spiking fever, or if white cell count would steadily increase would recommend repeat CT the abdomen pelvis     2  ESBL e coli bacteremia  Likely secondary to gut translocation during recent colon surgery  No other definitive source appreciated  Patient does have a Port-A-Cath in place  Repeat blood cultures negative thus far    -continue IV ertapenem  -check CBCD and CMP tomorrow  -follow up formal ID and sensitivity  -follow up repeat blood cultures  -monitor vitals  -tentative plan for 7 days of intravenous antibiotics through 11/15/2022     3  Metastatic colon cancer  Patient with known metastasis to liver  Patient follows closely with Dr Alex Linton  He is now s/p transverse colon resection, loop colostomy reversal, peritoneal bx, and segment 3 liver resection on 11/7/2022  It has been determined he will require additional R hepatic resection in the future    -continue close follow up with surgical oncology     4  Acute kidney injury  On CKD stage 3  Likely due to sepsis and hemorrhagic shock above  Upon review of patient's available past medical records it appears his baseline creatinine is approximately 1 2    Renal function remains abnormal but stable  -recheck BMP  -dose adjust antibiotic for renal function as needed  -avoid nephrotoxins  -volume management per critical care team     5  Acute hypoxic respiratory failure  Now with progressive decline and requiring ventilatory support  No pulmonary embolism or clear pneumonia seen on CT scan  Repeat chest x-ray improve  -volume management per critical care team  -pulmonary toilet  -monitor vitals  -monitor respiratory status  -wean off ventilator as able     6  Acute encephalopathy  Likely multifactorial in setting of sepsis, hemorrhagic shock, hypoxia, and ESBL e coli bacteremia  Patient very difficult to wake up during my visit but was eventually able to tell me his name, wife's name, and that he was in [place]  Also remembered his nurse and recalled my name towards end of my visit  Now sedated on the ventilator  -antibiotic as above  -serial neuro exams  -monitor mood and mental status  -supportive care     7  Type 2 diabetes mellitus without long term insulin use  Patient's last HbA1c was 8 4% on 2022  Elevated blood glucose is risk factor for infection  Inpatient endocrinology is following patient closely and are titrating insulin dosing   -blood glucose management per endocrinology      8  Morbid obesity  BMI = 37 56  Discussed the above management plan with the critical care service    Will see the patient again 2022    Please tiger text me over the weekend if any questions    Antibiotics:  Ertapenem 3    Subjective:  Patient still with intermittent low-grade fever; no report vomiting, diarrhea; continues require low level ventilatory support; not requiring vasopressor support; opens eyes to command    Objective:  Vitals:  Temp:  [97 16 °F (36 2 °C)-100 76 °F (38 2 °C)] 99 32 °F (37 4 °C)  HR:  [] 90  Resp:  [15-22] 18  BP: (153)/(92) 153/92  SpO2:  [95 %-99 %] 98 %  Temp (24hrs), Av °F (37 2 °C), Min:97 16 °F (36 2 °C), Max:100 76 °F (38 2 °C)  Current: Temperature: 99 32 °F (37 4 °C)    Physical Exam:   General Appearance:  Resting on the ventilator no acute distress  Throat: Oropharynx moist without lesions  Endotracheal tube in place   Lungs:   Clear to auscultation bilaterally; no wheezes, rhonchi or rales; respirations unlabored   Heart:  RRR; no murmur, rub or gallop   Abdomen:   Soft, non-tender, non-distended, positive bowel sounds  Extremities: No clubbing, cyanosis or edema   Skin: No new rashes or lesions  No draining wounds noted  Labs, Imaging, & Other studies:   All pertinent labs and imaging studies were personally reviewed  Results from last 7 days   Lab Units 11/11/22  0430 11/10/22  0450 11/10/22  0215 11/10/22  0000   WBC Thousand/uL 10 24* 14 02*  --  9 18   HEMOGLOBIN g/dL 7 4* 8 4* 8 5* 8 3*   PLATELETS Thousands/uL 146* 139*  --  133*     Results from last 7 days   Lab Units 11/11/22  0430 11/10/22  0450 11/09/22  2215 11/09/22  0423 11/08/22  0631 11/07/22  0835   SODIUM mmol/L 142 140 138 137   < >  --    POTASSIUM mmol/L 4 0 4 3 4 7 4 0   < >  --    CHLORIDE mmol/L 112* 109* 109* 106   < >  --    CO2 mmol/L 23 23 21 23   < >  --    CO2, I-STAT mmol/L  --   --   --   --   --  26   BUN mg/dL 40* 44* 38* 33*   < >  --    CREATININE mg/dL 1 70* 2 21* 2 33* 2 13*   < >  --    EGFR ml/min/1 73sq m 43 31 29 33   < >  --    GLUCOSE, ISTAT mg/dl  --   --   --   --   --  169*   CALCIUM mg/dL 8 2* 8 1* 8 9 8 5   < >  --    AST U/L 68* 127*  --  308*   < >  --    ALT U/L 86* 137*  --  222*   < >  --    ALK PHOS U/L 181* 247*  --  192*   < >  --     < > = values in this interval not displayed  Results from last 7 days   Lab Units 11/10/22  0450 11/08/22  2230   BLOOD CULTURE  No Growth at 24 hrs  No Growth at 24 hrs   Escherichia coli ESBL*  Escherichia coli ESBL*   GRAM STAIN RESULT   --  Gram negative rods*  Gram negative rods*        MRI brain-no acute infarct or other acute process    Chest x-ray-improved congestive heart failure    Images personally reviewed by me in PACS

## 2022-11-11 NOTE — OCCUPATIONAL THERAPY NOTE
OT CANCEL NOTE    OT orders received  Chart reviewed  Pt is currently intubated/sedated and not appropriate to engage in skilled OT services at this time  Will hold initial OT evaluation  Will continue to follow pt on caseload and see pt when medically stable and as clinically appropriate  11/11/22 0757   OT Last Visit   OT Visit Date 11/11/22   Note Type   Note type Evaluation; Cancelled Session   Cancel Reasons Intubated/sedated     Mildred Gottron MS, OTR/L

## 2022-11-11 NOTE — PLAN OF CARE
Problem: MOBILITY - ADULT  Goal: Maintain or return to baseline ADL function  Description: INTERVENTIONS:  -  Assess patient's ability to carry out ADLs; assess patient's baseline for ADL function and identify physical deficits which impact ability to perform ADLs (bathing, care of mouth/teeth, toileting, grooming, dressing, etc )  - Assess/evaluate cause of self-care deficits   - Assess range of motion  - Assess patient's mobility; develop plan if impaired  - Assess patient's need for assistive devices and provide as appropriate  - Encourage maximum independence but intervene and supervise when necessary  - Involve family in performance of ADLs  - Assess for home care needs following discharge   - Consider OT consult to assist with ADL evaluation and planning for discharge  - Provide patient education as appropriate  Outcome: Progressing  Goal: Maintains/Returns to pre admission functional level  Description: INTERVENTIONS:  - Perform BMAT or MOVE assessment daily    - Set and communicate daily mobility goal to care team and patient/family/caregiver  - Collaborate with rehabilitation services on mobility goals if consulted  - Perform Range of Motion 3 times a day  - Reposition patient every 2 hours    - Dangle patient - times a day  - Stand patient =  times a day  - Ambulate patient = times a day  - Out of bed to chair = times a day   - Out of bed for meals = times a day  - Out of bed for toileting  - Record patient progress and toleration of activity level   Outcome: Progressing     Problem: Prexisting or High Potential for Compromised Skin Integrity  Goal: Skin integrity is maintained or improved  Description: INTERVENTIONS:  - Identify patients at risk for skin breakdown  - Assess and monitor skin integrity  - Assess and monitor nutrition and hydration status  - Monitor labs   - Assess for incontinence   - Turn and reposition patient  - Assist with mobility/ambulation  - Relieve pressure over bony prominences  - Avoid friction and shearing  - Provide appropriate hygiene as needed including keeping skin clean and dry  - Evaluate need for skin moisturizer/barrier cream  - Collaborate with interdisciplinary team   - Patient/family teaching  - Consider wound care consult   Outcome: Progressing     Problem: Potential for Falls  Goal: Patient will remain free of falls  Description: INTERVENTIONS:  - Educate patient/family on patient safety including physical limitations  - Instruct patient to call for assistance with activity   - Consult OT/PT to assist with strengthening/mobility   - Keep Call bell within reach  - Keep bed low and locked with side rails adjusted as appropriate  - Keep care items and personal belongings within reach  - Initiate and maintain comfort rounds  - Make Fall Risk Sign visible to staff  - Offer Toileting every - Hours, in advance of need  - Initiate/Maintain -alarm  - Obtain necessary fall risk management equipment: -  - Apply yellow socks and bracelet for high fall risk patients  - Consider moving patient to room near nurses station  Outcome: Progressing     Problem: Nutrition/Hydration-ADULT  Goal: Nutrient/Hydration intake appropriate for improving, restoring or maintaining nutritional needs  Description: Monitor and assess patient's nutrition/hydration status for malnutrition  Collaborate with interdisciplinary team and initiate plan and interventions as ordered  Monitor patient's weight and dietary intake as ordered or per policy  Utilize nutrition screening tool and intervene as necessary  Determine patient's food preferences and provide high-protein, high-caloric foods as appropriate       INTERVENTIONS:  - Monitor oral intake, urinary output, labs, and treatment plans  - Assess nutrition and hydration status and recommend course of action  - Evaluate amount of meals eaten  - Assist patient with eating if necessary   - Allow adequate time for meals  - Recommend/ encourage appropriate diets, oral nutritional supplements, and vitamin/mineral supplements  - Order, calculate, and assess calorie counts as needed  - Recommend, monitor, and adjust tube feedings and TPN/PPN based on assessed needs  - Assess need for intravenous fluids  - Provide specific nutrition/hydration education as appropriate  - Include patient/family/caregiver in decisions related to nutrition  Outcome: Progressing     Problem: PAIN - ADULT  Goal: Verbalizes/displays adequate comfort level or baseline comfort level  Description: Interventions:  - Encourage patient to monitor pain and request assistance  - Assess pain using appropriate pain scale  - Administer analgesics based on type and severity of pain and evaluate response  - Implement non-pharmacological measures as appropriate and evaluate response  - Consider cultural and social influences on pain and pain management  - Notify physician/advanced practitioner if interventions unsuccessful or patient reports new pain  Outcome: Progressing     Problem: INFECTION - ADULT  Goal: Absence or prevention of progression during hospitalization  Description: INTERVENTIONS:  - Assess and monitor for signs and symptoms of infection  - Monitor lab/diagnostic results  - Monitor all insertion sites, i e  indwelling lines, tubes, and drains  - Monitor endotracheal if appropriate and nasal secretions for changes in amount and color  - [place] appropriate cooling/warming therapies per order  - Administer medications as ordered  - Instruct and encourage patient and family to use good hand hygiene technique  - Identify and instruct in appropriate isolation precautions for identified infection/condition  Outcome: Progressing  Goal: Absence of fever/infection during neutropenic period  Description: INTERVENTIONS:  - Monitor WBC    Outcome: Progressing     Problem: SAFETY ADULT  Goal: Maintain or return to baseline ADL function  Description: INTERVENTIONS:  -  Assess patient's ability to carry out ADLs; assess patient's baseline for ADL function and identify physical deficits which impact ability to perform ADLs (bathing, care of mouth/teeth, toileting, grooming, dressing, etc )  - Assess/evaluate cause of self-care deficits   - Assess range of motion  - Assess patient's mobility; develop plan if impaired  - Assess patient's need for assistive devices and provide as appropriate  - Encourage maximum independence but intervene and supervise when necessary  - Involve family in performance of ADLs  - Assess for home care needs following discharge   - Consider OT consult to assist with ADL evaluation and planning for discharge  - Provide patient education as appropriate  Outcome: Progressing  Goal: Maintains/Returns to pre admission functional level  Description: INTERVENTIONS:  - Perform BMAT or MOVE assessment daily    - Set and communicate daily mobility goal to care team and patient/family/caregiver  - Collaborate with rehabilitation services on mobility goals if consulted  - Perform Range of Motion 3 times a day  - Reposition patient every 2 hours    - Dangle patient - times a day  - Stand patient - times a day  - Ambulate patient - times a day  - Out of bed to chair - times a day   - Out of bed for meals - times a day  - Out of bed for toileting  - Record patient progress and toleration of activity level   Outcome: Progressing  Goal: Patient will remain free of falls  Description: INTERVENTIONS:  - Educate patient/family on patient safety including physical limitations  - Instruct patient to call for assistance with activity   - Consult OT/PT to assist with strengthening/mobility   - Keep Call bell within reach  - Keep bed low and locked with side rails adjusted as appropriate  - Keep care items and personal belongings within reach  - Initiate and maintain comfort rounds  - Make Fall Risk Sign visible to staff  - Offer Toileting every - Hours, in advance of need  - Initiate/Maintain -alarm  - Obtain necessary fall risk management equipment: -  - Apply yellow socks and bracelet for high fall risk patients  - Consider moving patient to room near nurses station  Outcome: Progressing     Problem: DISCHARGE PLANNING  Goal: Discharge to home or other facility with appropriate resources  Description: INTERVENTIONS:  - Identify barriers to discharge w/patient and caregiver  - Arrange for needed discharge resources and transportation as appropriate  - Identify discharge learning needs (meds, wound care, etc )  - Arrange for interpretive services to assist at discharge as needed  - Refer to Case Management Department for coordinating discharge planning if the patient needs post-hospital services based on physician/advanced practitioner order or complex needs related to functional status, cognitive ability, or social support system  Outcome: Progressing     Problem: Knowledge Deficit  Goal: Patient/family/caregiver demonstrates understanding of disease process, treatment plan, medications, and discharge instructions  Description: Complete learning assessment and assess knowledge base    Interventions:  - Provide teaching at level of understanding  - Provide teaching via preferred learning methods  Outcome: Progressing     Problem: SAFETY,RESTRAINT: NV/NON-SELF DESTRUCTIVE BEHAVIOR  Goal: Remains free of harm/injury (restraint for non violent/non self-detsructive behavior)  Description: INTERVENTIONS:  - Instruct patient/family regarding restraint use   - Assess and monitor physiologic and psychological status   - Provide interventions and comfort measures to meet assessed patient needs   - Identify and implement measures to help patient regain control  - Assess readiness for release of restraint   Outcome: Progressing  Goal: Returns to optimal restraint-free functioning  Description: INTERVENTIONS:  - Assess the patient's behavior and symptoms that indicate continued need for restraint  - Identify and implement measures to help patient regain control  - Assess readiness for release of restraint   Outcome: Progressing

## 2022-11-12 PROBLEM — J96.01 ACUTE RESPIRATORY FAILURE WITH HYPOXIA (HCC): Status: ACTIVE | Noted: 2022-11-12

## 2022-11-12 PROBLEM — I34.0 MR (MITRAL REGURGITATION): Status: ACTIVE | Noted: 2022-11-12

## 2022-11-12 PROBLEM — J81.0 FLASH PULMONARY EDEMA (HCC): Status: ACTIVE | Noted: 2022-11-12

## 2022-11-12 PROBLEM — R78.81 BACTEREMIA: Status: ACTIVE | Noted: 2022-11-12

## 2022-11-12 PROBLEM — Z16.12 ESBL (EXTENDED SPECTRUM BETA-LACTAMASE) PRODUCING BACTERIA INFECTION: Status: ACTIVE | Noted: 2022-11-12

## 2022-11-12 PROBLEM — A49.9 ESBL (EXTENDED SPECTRUM BETA-LACTAMASE) PRODUCING BACTERIA INFECTION: Status: ACTIVE | Noted: 2022-11-12

## 2022-11-12 LAB
ALBUMIN SERPL BCP-MCNC: 1.8 G/DL (ref 3.5–5)
ALP SERPL-CCNC: 304 U/L (ref 46–116)
ALT SERPL W P-5'-P-CCNC: 62 U/L (ref 12–78)
AMMONIA PLAS-SCNC: 16 UMOL/L (ref 11–35)
ANION GAP SERPL CALCULATED.3IONS-SCNC: 9 MMOL/L (ref 4–13)
AST SERPL W P-5'-P-CCNC: 65 U/L (ref 5–45)
BASOPHILS # BLD AUTO: 0.03 THOUSANDS/ÂΜL (ref 0–0.1)
BILIRUB DIRECT SERPL-MCNC: 0.72 MG/DL (ref 0–0.2)
BILIRUB SERPL-MCNC: 1.33 MG/DL (ref 0.2–1)
BUN SERPL-MCNC: 39 MG/DL (ref 5–25)
CA-I BLD-SCNC: 1.06 MMOL/L (ref 1.12–1.32)
CALCIUM SERPL-MCNC: 8.2 MG/DL (ref 8.3–10.1)
CHLORIDE SERPL-SCNC: 111 MMOL/L (ref 96–108)
CO2 SERPL-SCNC: 23 MMOL/L (ref 21–32)
CREAT SERPL-MCNC: 1.59 MG/DL (ref 0.6–1.3)
ERYTHROCYTE [DISTWIDTH] IN BLOOD BY AUTOMATED COUNT: 13.7 % (ref 11.6–15.1)
GFR SERPL CREATININE-BSD FRML MDRD: 47 ML/MIN/1.73SQ M
GLUCOSE SERPL-MCNC: 180 MG/DL (ref 65–140)
GLUCOSE SERPL-MCNC: 214 MG/DL (ref 65–140)
GLUCOSE SERPL-MCNC: 228 MG/DL (ref 65–140)
GLUCOSE SERPL-MCNC: 265 MG/DL (ref 65–140)
GLUCOSE SERPL-MCNC: 284 MG/DL (ref 65–140)
HCT VFR BLD AUTO: 20.8 % (ref 36.5–49.3)
HGB BLD-MCNC: 7 G/DL (ref 12–17)
IMM GRANULOCYTES # BLD AUTO: 0.2 THOUSAND/UL (ref 0–0.2)
INR PPP: 1.1 (ref 0.84–1.19)
MAGNESIUM SERPL-MCNC: 2.6 MG/DL (ref 1.6–2.6)
MCH RBC QN AUTO: 30.6 PG (ref 26.8–34.3)
MCHC RBC AUTO-ENTMCNC: 33.7 G/DL (ref 31.4–37.4)
MCV RBC AUTO: 91 FL (ref 82–98)
NRBC BLD AUTO-RTO: 0 /100 WBCS
PHOSPHATE SERPL-MCNC: 4.5 MG/DL (ref 2.7–4.5)
PLATELET # BLD AUTO: 198 THOUSANDS/UL (ref 149–390)
PMV BLD AUTO: 10.7 FL (ref 8.9–12.7)
POTASSIUM SERPL-SCNC: 3.9 MMOL/L (ref 3.5–5.3)
PROT SERPL-MCNC: 5.4 G/DL (ref 6.4–8.4)
PROTHROMBIN TIME: 14.4 SECONDS (ref 11.6–14.5)
RBC # BLD AUTO: 2.29 MILLION/UL (ref 3.88–5.62)
SODIUM SERPL-SCNC: 143 MMOL/L (ref 135–147)
WBC # BLD AUTO: 11.77 THOUSAND/UL (ref 4.31–10.16)

## 2022-11-12 RX ORDER — ATORVASTATIN CALCIUM 40 MG/1
40 TABLET, FILM COATED ORAL
Status: DISCONTINUED | OUTPATIENT
Start: 2022-11-12 | End: 2022-11-16

## 2022-11-12 RX ORDER — ACETAMINOPHEN 325 MG/1
650 TABLET ORAL EVERY 6 HOURS PRN
Status: DISCONTINUED | OUTPATIENT
Start: 2022-11-12 | End: 2022-11-12

## 2022-11-12 RX ORDER — METHOCARBAMOL 500 MG/1
500 TABLET, FILM COATED ORAL 2 TIMES DAILY
Status: DISCONTINUED | OUTPATIENT
Start: 2022-11-12 | End: 2022-11-16

## 2022-11-12 RX ORDER — INSULIN LISPRO 100 [IU]/ML
2-12 INJECTION, SOLUTION INTRAVENOUS; SUBCUTANEOUS
Status: DISCONTINUED | OUTPATIENT
Start: 2022-11-12 | End: 2022-11-13

## 2022-11-12 RX ORDER — HYDROMORPHONE HCL/PF 1 MG/ML
0.5 SYRINGE (ML) INJECTION EVERY 4 HOURS PRN
Status: DISCONTINUED | OUTPATIENT
Start: 2022-11-12 | End: 2022-11-16

## 2022-11-12 RX ORDER — CALCIUM GLUCONATE 20 MG/ML
2 INJECTION, SOLUTION INTRAVENOUS ONCE
Status: COMPLETED | OUTPATIENT
Start: 2022-11-12 | End: 2022-11-12

## 2022-11-12 RX ORDER — FUROSEMIDE 10 MG/ML
20 INJECTION INTRAMUSCULAR; INTRAVENOUS ONCE
Status: COMPLETED | OUTPATIENT
Start: 2022-11-12 | End: 2022-11-12

## 2022-11-12 RX ORDER — OXYCODONE HYDROCHLORIDE 5 MG/1
5 TABLET ORAL EVERY 4 HOURS PRN
Status: DISCONTINUED | OUTPATIENT
Start: 2022-11-12 | End: 2022-11-16

## 2022-11-12 RX ORDER — FUROSEMIDE 10 MG/ML
10 INJECTION INTRAMUSCULAR; INTRAVENOUS ONCE
Status: COMPLETED | OUTPATIENT
Start: 2022-11-12 | End: 2022-11-12

## 2022-11-12 RX ORDER — DULOXETIN HYDROCHLORIDE 60 MG/1
60 CAPSULE, DELAYED RELEASE ORAL DAILY
Status: DISCONTINUED | OUTPATIENT
Start: 2022-11-12 | End: 2022-11-16

## 2022-11-12 RX ORDER — FUROSEMIDE 10 MG/ML
20 INJECTION INTRAMUSCULAR; INTRAVENOUS ONCE
Status: DISCONTINUED | OUTPATIENT
Start: 2022-11-12 | End: 2022-11-12

## 2022-11-12 RX ORDER — OXYCODONE HYDROCHLORIDE 10 MG/1
10 TABLET ORAL EVERY 4 HOURS PRN
Status: DISCONTINUED | OUTPATIENT
Start: 2022-11-12 | End: 2022-11-16

## 2022-11-12 RX ORDER — ACETAMINOPHEN 325 MG/1
975 TABLET ORAL EVERY 8 HOURS SCHEDULED
Status: DISCONTINUED | OUTPATIENT
Start: 2022-11-12 | End: 2022-11-16

## 2022-11-12 RX ORDER — POTASSIUM CHLORIDE 20 MEQ/1
20 TABLET, EXTENDED RELEASE ORAL ONCE
Status: COMPLETED | OUTPATIENT
Start: 2022-11-12 | End: 2022-11-12

## 2022-11-12 RX ORDER — PANTOPRAZOLE SODIUM 40 MG/1
40 TABLET, DELAYED RELEASE ORAL
Status: DISCONTINUED | OUTPATIENT
Start: 2022-11-12 | End: 2022-11-16

## 2022-11-12 RX ADMIN — METHOCARBAMOL 500 MG: 500 TABLET ORAL at 11:32

## 2022-11-12 RX ADMIN — INSULIN LISPRO 6 UNITS: 100 INJECTION, SOLUTION INTRAVENOUS; SUBCUTANEOUS at 18:14

## 2022-11-12 RX ADMIN — FENTANYL CITRATE 50 MCG: 50 INJECTION INTRAMUSCULAR; INTRAVENOUS at 00:38

## 2022-11-12 RX ADMIN — OXYCODONE HYDROCHLORIDE 5 MG: 5 TABLET ORAL at 18:04

## 2022-11-12 RX ADMIN — PANTOPRAZOLE SODIUM 40 MG: 40 TABLET, DELAYED RELEASE ORAL at 11:33

## 2022-11-12 RX ADMIN — ACETAMINOPHEN 975 MG: 325 TABLET, FILM COATED ORAL at 21:01

## 2022-11-12 RX ADMIN — ATORVASTATIN CALCIUM 40 MG: 40 TABLET, FILM COATED ORAL at 18:04

## 2022-11-12 RX ADMIN — FUROSEMIDE 10 MG: 10 INJECTION, SOLUTION INTRAMUSCULAR; INTRAVENOUS at 11:34

## 2022-11-12 RX ADMIN — FENTANYL CITRATE 50 MCG: 50 INJECTION INTRAMUSCULAR; INTRAVENOUS at 03:07

## 2022-11-12 RX ADMIN — METHOCARBAMOL 500 MG: 500 TABLET ORAL at 18:04

## 2022-11-12 RX ADMIN — INSULIN LISPRO 2 UNITS: 100 INJECTION, SOLUTION INTRAVENOUS; SUBCUTANEOUS at 00:31

## 2022-11-12 RX ADMIN — INSULIN LISPRO 4 UNITS: 100 INJECTION, SOLUTION INTRAVENOUS; SUBCUTANEOUS at 06:08

## 2022-11-12 RX ADMIN — AMLODIPINE BESYLATE 5 MG: 5 TABLET ORAL at 08:18

## 2022-11-12 RX ADMIN — INSULIN LISPRO 4 UNITS: 100 INJECTION, SOLUTION INTRAVENOUS; SUBCUTANEOUS at 11:41

## 2022-11-12 RX ADMIN — HEPARIN SODIUM 5000 UNITS: 5000 INJECTION INTRAVENOUS; SUBCUTANEOUS at 13:57

## 2022-11-12 RX ADMIN — DULOXETINE HYDROCHLORIDE 60 MG: 60 CAPSULE, DELAYED RELEASE ORAL at 11:33

## 2022-11-12 RX ADMIN — Medication 10 MG: at 03:12

## 2022-11-12 RX ADMIN — HEPARIN SODIUM 5000 UNITS: 5000 INJECTION INTRAVENOUS; SUBCUTANEOUS at 06:04

## 2022-11-12 RX ADMIN — FUROSEMIDE 20 MG: 10 INJECTION, SOLUTION INTRAMUSCULAR; INTRAVENOUS at 16:46

## 2022-11-12 RX ADMIN — CALCIUM GLUCONATE 2 G: 20 INJECTION, SOLUTION INTRAVENOUS at 08:21

## 2022-11-12 RX ADMIN — HEPARIN SODIUM 5000 UNITS: 5000 INJECTION INTRAVENOUS; SUBCUTANEOUS at 21:02

## 2022-11-12 RX ADMIN — POTASSIUM CHLORIDE 20 MEQ: 1500 TABLET, EXTENDED RELEASE ORAL at 08:17

## 2022-11-12 RX ADMIN — KETAMINE HYDROCHLORIDE 0.1 MG/KG/HR: 50 INJECTION, SOLUTION INTRAMUSCULAR; INTRAVENOUS at 19:06

## 2022-11-12 RX ADMIN — AMLODIPINE BESYLATE 5 MG: 5 TABLET ORAL at 18:04

## 2022-11-12 RX ADMIN — INSULIN LISPRO 6 UNITS: 100 INJECTION, SOLUTION INTRAVENOUS; SUBCUTANEOUS at 21:05

## 2022-11-12 RX ADMIN — PANTOPRAZOLE SODIUM 40 MG: 40 INJECTION, POWDER, FOR SOLUTION INTRAVENOUS at 08:20

## 2022-11-12 RX ADMIN — CHLORHEXIDINE GLUCONATE 15 ML: 1.2 SOLUTION ORAL at 08:20

## 2022-11-12 RX ADMIN — ERTAPENEM SODIUM 1000 MG: 1 INJECTION, POWDER, LYOPHILIZED, FOR SOLUTION INTRAMUSCULAR; INTRAVENOUS at 16:46

## 2022-11-12 RX ADMIN — ACETAMINOPHEN 650 MG: 325 TABLET ORAL at 11:33

## 2022-11-12 NOTE — UTILIZATION REVIEW
Elective Surgical Continued Stay Review    Date: 11/12/22    POD#: 5  Current Patient Class: inpatient  Current Level of Care: MICU    Assessment/Plan: 61 y o  male, initial surgery date 11/7/22 exploratory laparotomy, transverse colon resection, reversal of loop colostomy, peritoneal biopsy, and segment 3 hepatic resection w/ a post-operative course complicated by acute blood loss anemia due to LUQ hematoma and recurrent acute encephalopathy and hypoxic respiratory failure requiring endotracheal intubation, now extubated  Pt with an episode of hypertension and hypoxia treated with fentanyl, labetalol, and NRB with resolution  Pt with bloody bowel movement  Pt tolerating sips of clears, pain controlled extubated to 2L NC yesterday  Continue to monitor hgb, DC [place] and [place], Advance diet to clears  Pertinent Labs/Diagnostic Results:       Results from last 7 days   Lab Units 11/12/22  0604 11/11/22  0430 11/10/22  0450 11/10/22  0215 11/10/22  0000 11/09/22 2215 11/08/22  2230 11/08/22  1913 11/08/22  0631   WBC Thousand/uL 11 77* 10 24* 14 02*  --  9 18 1 40*   < > 10 68* 21 96*   HEMOGLOBIN g/dL 7 0* 7 4* 8 4* 8 5* 8 3* 9 7*   < > 8 6* 10 1*   HEMATOCRIT % 20 8* 21 6* 24 7* 25 0* 24 4* 29 6*   < > 26 6* 29 7*   PLATELETS Thousands/uL 198 146* 139*  --  133* 199   < > 225 327   NEUTROS ABS Thousands/µL  --  8 66*  --   --   --   --   --   --  19 40*   BANDS PCT %  --   --   --   --   --  6  --  4  --     < > = values in this interval not displayed           Results from last 7 days   Lab Units 11/12/22  0604 11/11/22  0430 11/10/22  0450 11/09/22 2215 11/09/22  0423   SODIUM mmol/L 143 142 140 138 137   POTASSIUM mmol/L 3 9 4 0 4 3 4 7 4 0   CHLORIDE mmol/L 111* 112* 109* 109* 106   CO2 mmol/L 23 23 23 21 23   ANION GAP mmol/L 9 7 8 8 8   BUN mg/dL 39* 40* 44* 38* 33*   CREATININE mg/dL 1 59* 1 70* 2 21* 2 33* 2 13*   EGFR ml/min/1 73sq m 47 43 31 29 33   CALCIUM mg/dL 8 2* 8 2* 8 1* 8 9 8 5   CALCIUM, IONIZED mmol/L 1 06* 1 07* 1 04* 1 11* 1 08*   MAGNESIUM mg/dL 2 6 2 7* 2 5 2 4 2 2   PHOSPHORUS mg/dL 4 5 4 5 5 0* 5 7* 3 4     Results from last 7 days   Lab Units 11/12/22  0604 11/11/22  0430 11/10/22  0450 11/09/22  0423 11/08/22  1913   AST U/L 65* 68* 127* 308* 379*   ALT U/L 62 86* 137* 222* 263*   ALK PHOS U/L 304* 181* 247* 192* 179*   TOTAL PROTEIN g/dL 5 4* 5 5* 5 3* 5 2* 5 2*   ALBUMIN g/dL 1 8* 1 9* 1 7* 2 1* 2 0*   TOTAL BILIRUBIN mg/dL 1 33* 1 01* 0 93 1 17* 0 69   BILIRUBIN DIRECT mg/dL 0 72*  --   --   --   --    AMMONIA umol/L 16  --   --   --   --      Results from last 7 days   Lab Units 11/12/22  1140 11/12/22  0031 11/11/22  1730 11/11/22  1152 11/11/22  0552 11/10/22  2358 11/10/22  1800 11/10/22  1217 11/10/22  0827 11/10/22  0211 11/09/22  2058 11/09/22  1610   POC GLUCOSE mg/dl 228* 180* 188* 181* 150* 187* 144* 155* 171* 136 166* 181*     Results from last 7 days   Lab Units 11/12/22  0604 11/11/22  0430 11/10/22  0450 11/09/22 2215 11/09/22  0423 11/08/22  1913 11/08/22  0631   GLUCOSE RANDOM mg/dL 214* 156* 175* 167* 199* 217* 213*      Results from last 7 days   Lab Units 11/10/22  0700 11/10/22  0215 11/10/22  0000   PH ART  7 393 7 379 7 350   PCO2 ART mm Hg 35 2* 40 4 40 0   PO2 ART mm Hg 96 7 155 5* 103 8   HCO3 ART mmol/L 21 0* 23 3 21 6*   BASE EXC ART mmol/L -3 5 -1 7 -3 7   O2 CONTENT ART mL/dL 12 0* 12 7* 12 4*   O2 HGB, ARTERIAL % 95 7 98 0* 95 8   ABG SOURCE  Line, Arterial Line, Arterial Line, Arterial     Results from last 7 days   Lab Units 11/08/22 2247 11/08/22  1922   PH RUMA  7 429* 7 386   PCO2 RUMA mm Hg 33 8* 34 4*   PO2 RUMA mm Hg 49 3* 36 6   HCO3 RUMA mmol/L 21 9* 20 2*   BASE EXC RUMA mmol/L -2 1 -4 3   O2 CONTENT RUMA ml/dL 9 1 8 0   O2 HGB, VENOUS % 81 7* 63 9     Results from last 7 days   Lab Units 11/07/22  0835   I STAT BASE EXC mmol/L 1   I STAT O2 SAT % 99*   ISTAT PH ART  7 431   I STAT ART PCO2 mm HG 37 6   I STAT ART PO2 mm  0*   I STAT ART HCO3 mmol/L 25 0         Results from last 7 days   Lab Units 11/10/22  0215 11/10/22  0000 11/09/22  2215 11/08/22  1257 11/08/22  1116 11/08/22  0914   HS TNI 0HR ng/L  --   --  78*  --   --  7   HS TNI 2HR ng/L  --  111*  --   --  6  --    HSTNI D2 ng/L  --  33*  --   --  -1  --    HS TNI 4HR ng/L 111*  --   --  7  --   --    HSTNI D4 ng/L 33*  --   --  0  --   --          Results from last 7 days   Lab Units 11/12/22  0604 11/09/22  2215   PROTIME seconds 14 4 15 8*   INR  1 10 1 24*   PTT seconds  --  37     Results from last 7 days   Lab Units 11/10/22  0450 11/10/22  0215 11/10/22  0000 11/09/22  2215 11/09/22  0737 11/09/22  0423 11/09/22  0207   LACTIC ACID mmol/L 2 0 2 4* 2 9* 5 6* 1 8 2 1* 2 6*     Results from last 7 days   Lab Units 11/09/22  1540   CLARITY UA  Turbid   COLOR UA  Yellow   SPEC GRAV UA  1 030   PH UA  5 5   GLUCOSE UA mg/dl 70 (7/100%)*   KETONES UA mg/dl Negative   BLOOD UA  Small*   PROTEIN UA mg/dl 300 (3+)*   NITRITE UA  Negative   BILIRUBIN UA  Negative   UROBILINOGEN UA (BE) mg/dl <2 0   LEUKOCYTES UA  Negative   WBC UA /hpf None Seen   RBC UA /hpf None Seen   BACTERIA UA /hpf None Seen   EPITHELIAL CELLS WET PREP /hpf Occasional   MUCUS THREADS  Occasional*     Results from last 7 days   Lab Units 11/10/22  0450 11/08/22  2230   BLOOD CULTURE  No Growth at 48 hrs  No Growth at 48 hrs   Escherichia coli ESBL*  Escherichia coli ESBL*   GRAM STAIN RESULT   --  Gram negative rods*  Gram negative rods*     Vital Signs:  Date/Time Temp Pulse Resp BP MAP (mmHg) Arterial Line BP MAP SpO2 FiO2 (%) Calculated FIO2 (%) - Nasal Cannula Nasal Cannula O2 Flow Rate (L/min) O2 Device Patient Position - Orthostatic VS   11/12/22 0900 -- 100 22 128/82 101 132/62 84 mmHg 97 % -- -- -- -- --   11/12/22 0800 98 5 °F (36 9 °C) 110 Abnormal  28 Abnormal  123/88 98 132/64 86 mmHg 96 % -- -- -- -- --   11/12/22 0700 -- 106 Abnormal  24 Abnormal  127/78 90 124/60 82 mmHg 97 % -- -- -- -- --   11/12/22 0600 -- 108 Abnormal  26 Abnormal  125/86 100 114/58 78 mmHg 97 % -- -- -- -- --   11/12/22 0500 -- 114 Abnormal  32 Abnormal  140/75 104 118/60 78 mmHg 96 % -- -- -- -- --   11/12/22 0400 98 8 °F (37 1 °C) 110 Abnormal  29 Abnormal  134/84 97 128/62 84 mmHg 96 % -- -- -- -- --   11/12/22 0300 -- 138 Abnormal  36 Abnormal  176/110 Abnormal  143 218/70 112 mmHg 90 % -- -- -- -- --   11/12/22 0200 -- 114 Abnormal  27 Abnormal  149/93 114 154/72 100 mmHg 94 % -- -- -- -- --   11/12/22 0100 -- 112 Abnormal  22 145/91 111 146/70 94 mmHg 93 % -- -- -- -- --   11/12/22 0000 98 8 °F (37 1 °C) 112 Abnormal  23 Abnormal  156/91 129 156/70 96 mmHg 93 % -- -- -- -- --   11/11/22 2300 -- 112 Abnormal  26 Abnormal  161/90 112 166/72 104 mmHg 93 % -- -- -- -- --   11/11/22 2200 -- 110 Abnormal  30 Abnormal  160/81 108 156/72 102 mmHg 93 % -- -- -- -- --   11/11/22 2100 -- 104 29 Abnormal  167/93 125 188/74 108 mmHg 94 % -- -- -- -- --   11/11/22 2000 98 7 °F (37 1 °C) 102 25 Abnormal  161/88 121 188/74 108 mmHg 94 % -- -- -- -- --   11/11/22 1900 -- 100 22 157/85 133 180/72 104 mmHg 93 % -- -- -- -- --   11/11/22 1800 -- 102 27 Abnormal  168/87 120 190/72 106 mmHg 94 % -- 28 2 L/min Nasal cannula --   11/11/22 1730 -- 100 22 167/92 116 188/72 108 mmHg 94 % -- -- -- -- --   11/11/22 1700 -- 100 23 Abnormal  162/86 117 172/70 102 mmHg 94 % -- -- -- -- --   11/11/22 1630 -- 100 24 Abnormal  159/84 108 182/72 106 mmHg 94 % -- -- -- -- --   11/11/22 1600 98 8 °F (37 1 °C) 102 24 Abnormal  154/80 127 178/70 104 mmHg 93 % -- 28 2 L/min Nasal cannula Lying   11/11/22 1544 -- 102 27 Abnormal  154/82 127 188/74 108 mmHg 93 % -- -- -- -- --   11/11/22 1430 -- 100 27 Abnormal  -- -- 180/70 104 mmHg 94 % -- -- -- -- --   11/11/22 1400 -- 100 21 -- -- 178/72 104 mmHg 94 % -- -- -- -- --   11/11/22 1330 -- 98 23 Abnormal  -- -- 176/70 102 mmHg 94 % -- -- -- -- --   11/11/22 1300 -- 96 23 Abnormal  -- -- 170/70 100 mmHg 94 % -- -- -- -- --   11/11/22 1230 -- 100 21 -- -- 170/68 100 mmHg 93 % -- -- -- -- --   11/11/22 1200 98 1 °F (36 7 °C) 100 24 Abnormal  -- -- 160/66 96 mmHg 93 % -- 28 2 L/min Nasal cannula Lying   11/11/22 1130 -- 100 24 Abnormal  -- -- 170/70 102 mmHg 93 % -- -- -- -- --   11/11/22 1100 -- 102 23 Abnormal  -- -- 162/66 98 mmHg 92 % -- -- -- -- --   11/11/22 1030 -- 100 24 Abnormal  -- -- 166/68 98 mmHg 94 % -- -- -- -- --   11/11/22 1000 99 32 °F (37 4 °C) 96 21 -- -- 170/70 100 mmHg 98 % -- 28 2 L/min Nasal cannula --   11/11/22 0930 99 32 °F (37 4 °C) 94 20 -- -- 172/70 102 mmHg 98 % -- -- -- -- --   11/11/22 0900 99 32 °F (37 4 °C) 94 19 -- -- 168/70 100 mmHg 98 % -- -- -- -- --   11/11/22 0830 99 32 °F (37 4 °C) 92 21 -- -- 168/68 98 mmHg 98 % -- -- -- -- --   11/11/22 0813 -- -- -- -- -- -- -- 98 % -- -- -- -- --   11/11/22 0800 99 32 °F (37 4 °C) 92 20 -- -- 166/68 96 mmHg 97 % 40 -- -- Ventilator Lying   11/11/22 0730 99 32 °F (37 4 °C) 92 20 -- -- 162/68 96 mmHg 97 % -- -- -- -- --   11/11/22 0700 99 32 °F (37 4 °C) 90 18 -- -- 146/64 88 mmHg 98 % -- -- -- -- --   11/11/22 0610 99 32 °F (37 4 °C) 92 18 -- -- 164/70 98 mmHg 97 % -- -- -- -- --   11/11/22 0600 99 32 °F (37 4 °C) 92 17 -- -- 160/68 96 mmHg 97 % -- -- -- -- --   11/11/22 0500 99 32 °F (37 4 °C) 94 18 -- -- 156/66 94 mmHg 97 % -- -- -- -- --   11/11/22 0430 99 3 °F (37 4 °C) 94 16 -- -- 146/64 90 mmHg 97 % -- -- -- -- --   11/11/22 0400 98 96 °F (37 2 °C) 92 19 -- -- 180/76 106 mmHg 97 % 40 -- -- Ventilator --   11/11/22 0300 98 6 °F (37 °C) 90 17 -- -- 158/66 94 mmHg 98 % -- -- -- -- --   11/11/22 0244 -- -- -- -- -- -- -- 99 % -- -- -- -- --   11/11/22 0230 98 24 °F (36 8 °C) 92 17 -- -- 166/67 94 mmHg 98 % -- -- -- -- --   11/11/22 0200 98 6 °F (37 °C) 96 16 -- -- 144/62 86 mmHg 98 % -- -- -- -- --   11/11/22 0145 98 6 °F (37 °C) 94 18 -- -- 166/66 94 mmHg 98 % -- -- -- -- --   11/11/22 0100 98 6 °F (37 °C) 96 17 -- -- 152/62 88 mmHg 98 % -- -- -- -- --   11/11/22 0000 99 68 °F (37 6 °C) 100 16 -- -- 146/58 84 mmHg 97 % 40 -- -- Ventilator --   11/10/22 2350 99 68 °F (37 6 °C) 100 18 -- -- 180/66 96 mmHg 97 % -- -- -- -- --   11/10/22 2304 -- -- -- -- -- -- -- 98 % -- -- -- -- --   11/10/22 2300 -- 98 22 -- -- 178/64 94 mmHg 98 % -- -- -- -- --   11/10/22 2200 100 4 °F (38 °C) 99 20 153/92 104 -- -- 97 % -- -- -- -- --   11/10/22 2100 100 76 °F (38 2 °C) Abnormal  102 17 -- -- 142/62 86 mmHg 96 % -- -- -- -- --   11/10/22 2030 100 76 °F (38 2 °C) Abnormal  100 17 -- -- 164/68 94 mmHg 96 % -- -- -- -- --   11/10/22 2017 -- -- -- -- -- -- -- 96 % -- -- -- -- --   11/10/22 2000 100 4 °F (38 °C) 108 Abnormal  20 -- -- 182/66 98 mmHg 97 % 40 -- -- Ventilator --   11/10/22 1900 100 4 °F (38 °C) 104 18 -- -- 174/66 94 mmHg 98 % -- -- -- -- --   11/10/22 1830 100 °F (37 8 °C) 106 Abnormal  18 -- -- 172/64 94 mmHg 98 % -- -- -- -- --   11/10/22 1800 100 °F (37 8 °C) 104 21 -- -- 160/62 88 mmHg 96 % 40 -- -- Ventilator --   11/10/22 1730 99 7 °F (37 6 °C) 104 21 -- -- 160/62 88 mmHg 97 % -- -- -- -- --   11/10/22 1700 100 °F (37 8 °C) 102 16 -- -- 158/62 88 mmHg 97 % -- -- -- -- --   11/10/22 1630 100 °F (37 8 °C) 102 19 -- -- 166/64 90 mmHg 97 % -- -- -- -- --   11/10/22 1600 99 68 °F (37 6 °C) 98 17 -- -- 168/64 90 mmHg 97 % 40 -- -- Ventilator Lying   11/10/22 1530 99 32 °F (37 4 °C) 98 18 -- -- 160/62 88 mmHg 96 % -- -- -- -- --   11/10/22 1500 99 32 °F (37 4 °C) 96 19 -- -- 156/62 88 mmHg 96 % -- -- -- -- --   11/10/22 1430 98 96 °F (37 2 °C) 100 17 -- -- 174/64 92 mmHg 97 % -- -- -- -- --   11/10/22 1400 98 6 °F (37 °C) 102 19 -- -- 172/64 92 mmHg 96 % 40 -- -- Ventilator --   11/10/22 1330 98 6 °F (37 °C) 100 22 -- -- 168/64 92 mmHg 96 % -- -- -- -- --   11/10/22 1300 98 2 °F (36 8 °C) 96 16 -- -- 154/60 86 mmHg 96 % -- -- -- -- --   11/10/22 1230 97 88 °F (36 6 °C) 92 20 -- -- 154/64 88 mmHg 96 % -- -- -- -- --   11/10/22 1200 97 88 °F (36 6 °C) 88 15 -- -- 130/58 78 mmHg 96 % 40 -- -- Ventilator Lying   11/10/22 1149 97 88 °F (36 6 °C) 88 15 -- -- 120/54 74 mmHg 96 % -- -- -- -- --   11/10/22 1146 97 88 °F (36 6 °C) 88 15 -- -- 120/56 74 mmHg 96 % -- -- -- -- --   11/10/22 1142 -- -- -- -- -- -- -- 96 % -- -- -- -- --   11/10/22 1140 -- -- -- -- -- -- -- 96 % -- -- -- Ventilator --   11/10/22 1130 97 88 °F (36 6 °C) 94 17 -- -- 152/62 86 mmHg 96 % -- -- -- -- --   11/10/22 1100 97 52 °F (36 4 °C) 94 18 -- -- 154/64 88 mmHg 95 % -- -- -- -- --   11/10/22 1030 97 52 °F (36 4 °C) 92 15 -- -- 144/62 84 mmHg 96 % -- -- -- -- --   11/10/22 1000 97 16 °F (36 2 °C) Abnormal  90 16 -- -- 140/62 84 mmHg 96 % -- -- -- -- --     Medications:   Scheduled Medications:  acetaminophen, 975 mg, Oral, Q8H SHANNON  amLODIPine, 5 mg, Oral, BID  atorvastatin, 40 mg, Oral, Daily With Dinner  DULoxetine, 60 mg, Oral, Daily  ertapenem, 1,000 mg, Intravenous, Q24H  heparin (porcine), 5,000 Units, Subcutaneous, Q8H [address]  insulin lispro, 2-12 Units, Subcutaneous, 4x Daily (AC & HS)  methocarbamol, 500 mg, Oral, BID  pantoprazole, 40 mg, Oral, Early Morning      Continuous IV Infusions:  ketamine, 0 1 mg/kg/hr (Ideal), Intravenous, Continuous      PRN Meds:  glycopyrrolate, 0 2 mg, Intravenous, Q4H PRN  haloperidol lactate, 2 mg, Intramuscular, Q30 Min PRN  HYDROmorphone, 0 5 mg, Intravenous, Q4H PRN  iohexol, 100 mL, Intravenous, Once in imaging  labetalol, 10 mg, Intravenous, Q4H PRN  LORazepam, 1 mg, Intravenous, Q1H PRN  ondansetron, 4 mg, Intravenous, Q4H PRN  oxyCODONE, 5 mg, Oral, Q4H PRN   Or  oxyCODONE, 10 mg, Oral, Q4H PRN  phenol, 1 spray, Mouth/Throat, Q2H PRN          Discharge Plan: d    Network Utilization Review Department  ATTENTION: Please call with any questions or concerns to [phone] and carefully listen to the prompts so that you are directed to the right person   All voicemails are confidential   Jhoana Sierra all requests for admission clinical reviews, approved or denied determinations and any other requests to dedicated fax number below belonging to the campus where the patient is receiving treatment   List of dedicated fax numbers for the Facilities:  [address] DENIALS (Administrative/Medical Necessity) [phone]   [address] (Maternity/NICU/Pediatrics) [phone]   3 Ericka Pinto [phone]   Sachi Tyler 77 [phone]   [address] [address] [facility] [phone]   155 [facility] 134 815 [facility] [phone]

## 2022-11-12 NOTE — PROGRESS NOTES
Progress Note - Surgical Oncology  Sydell Form 61 y o  male MRN: [id number]  [facility] [id number]      Assessment:  62yo M POD3 s/p exploratory laparotomy, transverse colon resection, reversal of loop colostomy, peritoneal biopsy, and segment 3 hepatic resection w/ a post-operative course complicated by acute blood loss anemia due to LUQ hematoma and recurrent acute encephalopathy and hypoxic respiratory failure requiring endotracheal intubation, now extubated  Afebrile, VSS   Tachy and tachypneic on   UOP: 3 6       Plan:  - Continue to monitor hemoglobin for evidence of ongoing intra-abdominal hemorrhage  -Dc arleen and rivero  -consider downgrading  -dc rivero  -advance diet to clears      Subjective/Objective     Subjective:    tolerating sips of clears, pain controlled extubated to 2L NC yesterday  Having bowel function, no SOB or CP,     Objective:     Blood pressure 161/88, pulse 102, temperature 98 8 °F (37 1 °C), temperature source Oral, resp  rate (!) 25, height 5' 8" (1 727 m), weight 121 kg (266 lb 1 5 oz), SpO2 94 %  ,Body mass index is 40 46 kg/m²  Physical Exam  Vitals reviewed  Constitutional:       General: He is not in acute distress  Appearance: He is not ill-appearing, toxic-appearing or diaphoretic  HENT:      Head: Normocephalic and atraumatic  Eyes:      Extraocular Movements: Extraocular movements intact  Cardiovascular:      Rate and Rhythm: Normal rate  Pulmonary:      Effort: Pulmonary effort is normal  No respiratory distress  Abdominal:      General: There is no distension  Palpations: Abdomen is soft  Tenderness: There is no abdominal tenderness  There is no guarding or rebound  Comments: Incision clean, dry and intact   Skin:     General: Skin is warm and dry  Neurological:      Mental Status: He is alert and oriented to person, place, and time  Mental status is at baseline     Psychiatric:         Mood and Affect: Mood normal              Intake/Output Summary (Last 24 hours) at 11/12/2022 0718  Last data filed at 11/12/2022 6209  Gross per 24 hour   Intake 1445 34 ml   Output 3570 ml   Net -2124 66 ml       Invasive Devices  Report    Central Venous Catheter Line  Duration           Port A Cath 03/10/22 Right Chest 246 days          Peripheral Intravenous Line  Duration           Peripheral IV 11/09/22 Left;Proximal;Ventral (anterior) Forearm 3 days    Peripheral IV 11/09/22 Proximal;Right;Ventral (anterior) Forearm 3 days    Peripheral IV 11/10/22 Dorsal (posterior); Left Wrist 2 days    Peripheral IV 11/10/22 Dorsal (posterior); Right Wrist 2 days          Epidural Line  Duration           Epidural Catheter 11/07/22 5 days          Arterial Line  Duration           Arterial Line 11/08/22 Radial 3 days          Drain  Duration           Urethral Catheter Latex 16 Fr  4 days                I have personally reviewed pertinent lab results    , CBC:   No results found for: WBC, HGB, HCT, MCV, PLT, ADJUSTEDWBC, MCH, MCHC, RDW, MPV, NRBC, CMP:   Lab Results   Component Value Date    SODIUM 143 11/12/2022    K 3 9 11/12/2022     (H) 11/12/2022    CO2 23 11/12/2022    BUN 39 (H) 11/12/2022    CREATININE 1 59 (H) 11/12/2022    CALCIUM 8 2 (L) 11/12/2022    AST 65 (H) 11/12/2022    ALT 62 11/12/2022    ALKPHOS 304 (H) 11/12/2022    EGFR 47 11/12/2022   , Coagulation:   Lab Results   Component Value Date    INR 1 10 11/12/2022

## 2022-11-12 NOTE — PROGRESS NOTES
Progress Note - Acute Pain Service    Savage Holloway 61 y o  male MRN: [id number]  Unit/Bed#: MICU 02 Encounter: [id number]      Assessment:   Principal Problem:    Colon cancer metastasized to liver [facility])  Active Problems:    Benign essential hypertension    Type 2 diabetes mellitus with hyperlipidemia ([facility] )    Malignant neoplasm of transverse colon (HCC)    Encephalopathy    Flash pulmonary edema (HCC)    MR (mitral regurgitation)    Bacteremia    ESBL (extended spectrum beta-lactamase) producing bacteria infection    Acute respiratory failure with hypoxia ([facility] )    Savage Holloway is a 61y o  year old male with PMHx of metastatic colon cancer s/p exploratory laparotomy with transverse colon resection, reversal of loop colostomy, peritoneal biopsy, segment 3 liver resection on 11/07/2022   Patient received a preoperative epidural at that time, and APS was consulted for postop with epidural management   Of note, palliative Medicine is following  He's now extubated  He denies current pain on the ketamine and epidural infusions  It's been 5 days and there's been a noted positive blood culture in that timeframe  Current cultures are negative, but I think the right call here is to remove the epidural     Epidural removed, tip intact  Plan:   1  Epidural removed  Will contact palliative care for them to assume management  2  Continue ketamine infusion for an additional day  3  Would schedule tylenol 650 mg PO q6h   4  Would convert to a predominant oral regimen: oxycodone 5/10 mg q4h PRN mod/severe pain    5  Dilaudid 0 5 mg IV q2h PRN breakthrough   APS will continue to follow; please contact APS ( btn [phone]) with any further questions    Pain History  Current pain location(s): abdomen  Pain Scale:   1/10  Quality: burning  24 hour history: extubated    Opioid requirement previous 24 hours: none    Meds/Allergies   all current active meds have been reviewed    Allergies   Allergen Reactions   • Shellfish-Derived Products - Food Allergy Anaphylaxis   • Erythromycin GI Intolerance       Objective     Temp:  [98 1 °F (36 7 °C)-98 8 °F (37 1 °C)] 98 5 °F (36 9 °C)  HR:  [] 100  Resp:  [21-36] 22  BP: (123-176)/() 128/82  Arterial Line BP: (114-218)/(58-74) 132/62    Physical Exam  Constitutional:       Appearance: He is obese  He is ill-appearing and toxic-appearing  HENT:      Head: Normocephalic  Nose: Nose normal       Mouth/Throat:      Mouth: Mucous membranes are dry  Cardiovascular:      Rate and Rhythm: Tachycardia present  Pulmonary:      Effort: Pulmonary effort is normal    Skin:     General: Skin is warm  Neurological:      General: No focal deficit present  Mental Status: He is alert and oriented to person, place, and time  Mental status is at baseline  Psychiatric:         Behavior: Behavior normal          Thought Content: Thought content normal          Judgment: Judgment normal       Comments: Seems depressed         Lab Results:   Results from last 7 days   Lab Units 11/12/22  0604   WBC Thousand/uL 11 77*   HEMOGLOBIN g/dL 7 0*   HEMATOCRIT % 20 8*   PLATELETS Thousands/uL 198      Results from last 7 days   Lab Units 11/12/22  0604 11/08/22  0631 11/07/22  0835   POTASSIUM mmol/L 3 9   < >  --    CHLORIDE mmol/L 111*   < >  --    CO2 mmol/L 23   < >  --    CO2, I-STAT mmol/L  --   --  26   BUN mg/dL 39*   < >  --    CREATININE mg/dL 1 59*   < >  --    CALCIUM mg/dL 8 2*   < >  --    ALK PHOS U/L 304*   < >  --    ALT U/L 62   < >  --    AST U/L 65*   < >  --    GLUCOSE, ISTAT mg/dl  --   --  169*    < > = values in this interval not displayed  Imaging Studies: I have personally reviewed pertinent reports  EKG, Pathology, and Other Studies: I have personally reviewed pertinent reports        Maria D Presley MD

## 2022-11-12 NOTE — PROGRESS NOTES
Daily Progress Note - Critical Care   Catracho Ledesma 61 y o  male MRN: [id number]  Unit/Bed#: MICU 02 Encounter: [id number]        ----------------------------------------------------------------------------------------  HPI/24hr events: Per Dr Corona Dang: Patient is a 58-year-old male with a significant past medical history of metastatic colon cancer, status post exploratory laparotomy transverse colon resection, reversal of loop colostomy, peritoneal biopsy, and segment 3 liver resection 11/07/2022   Patient with CT head and CTA chest abdomen pelvis on 11/08/2022 that showed hematoma with no active extravasation, resuscitated with 2 units PRBCs and 1 unit FFP  Patient with an episode of hypertension and hypoxia treated with fentanyl, labetalol, and NRB with resolution  Per RN, patient with bloody bowel movement  No other overnight events  ---------------------------------------------------------------------------------------  SUBJECTIVE  Patient is resting comfortably, follows all commands, reports occasional abdominal pain with palpation and movement, denies any new or worsening complaints  Review of Systems   Gastrointestinal: Positive for abdominal pain  All other systems reviewed and are negative      Review of systems was reviewed and negative unless stated above in HPI/24-hour events   ---------------------------------------------------------------------------------------  Assessment and Plan:    Neuro:   • Diagnosis: Acute metabolic encephalopathy, altered mental status  o 11/8 CT Head: negative  o MRI: no acute infarct, possible chronic microangiopathic changes  o Plan:  Delirium precautions  o Maintain sleep-wake cycle  o Serial neuro exams  o Neurology consult  • Diagnosis:  Pain control  o Plan:  Multimodal analgesia  o Fentanyl prn, PCA, ketamine GTT  o APS consult - discuss epidural    CV:   • Diagnosis: Hemorrhagic Shock  o 11/8 c/a/p: 8cm LUQ hematoma, no active extrav  o Received 2 units PRBCs, 1 unit FFP  o Plan:  Continue monitor signs and symptoms of bleeding  o Trend hemoglobin and transfuse for hemoglobin less than 7  o Maintain map greater than 65  • Diagnosis:  Paroxysmal atrial tachycardia  o Trops negative  o EKG showed no STEMI or acute changes  o Plan:  Continue cardiac monitoring  o Cardiology consult  o Echo: LVEF 55%, overall normal  • Diagnosis: HTN  o Plan:  Continue to hold home lisinopril  o Restart home amlodipine  o Labetalol p r n  For systolic blood pressure greater than 160  • Diagnosis: HLD  o Plan:  Consider resuming home Lipitor    Pulm:  • Diagnosis:  Acute hypoxic respiratory failure  o 11/8 CT:  Pulmonary edema, bilateral pleural effusions  o 11/9 CT:  No pulmonary emboli, bilateral pleural effusions/atelectasis  o Extubated yesterday  o Plan:  Maintain SpO2 greater than 92%  o Continuous pulse oximetry    GI:   · Diagnosis:  Metastatic lung cancer  ? Status post ex lap, transverse colon resection, loop colostomy reversal, peritoneal biopsy, segment 3 liver resection 11/07/2022  ? Plan:  Per Surgical Oncology  ? Serial abdominal examinations  · Diagnosis:  Transaminitis, secondary to liver section  ? Plan:  Continue to monitor LFTs  · Diagnosis:  GERD  ? Plan:  Continue Protonix  • Diagnosis: GI Bleed  o Plan: Trend Hgb  o Consider GI consult    :   • Diagnosis: ALEXY  o Plan:  Monitor BUN creatinine  o Strict I&Os  o Continue Negro  o Consider Lasix as needed    F/E/N:   • F: none  • E:Replace to goal  • N: NPO, sips of clear liquids    Heme/Onc:   • Diagnosis:  Acute blood loss anemia  o Plan:  Monitor hemoglobin and signs of bleeding  o Transfuse for hemoglobin less than 7    Endo:   · Diagnosis:  Type 2 diabetes mellitus  ? Plan:   ? Endocrinology following  ? Blood sugar goal 140-180  ? ISS, Q6h glucose checks     ID:   · Diagnosis: SIRS  ? 11/8 Blood Cultures: Gram negative rods, E  Coli ( ESBL)   ? Plan:   ? Ertapenem IV  ? Follow cultures  ?  Monitor WBC and fever curve     MSK/Skin:   · Diagnosis: Surgical Wound  ? Plan:  Per Surgical Oncology  · Diagnosis:  No acute issues  ? Plan:  PT/OT when able, frequent turning/repositioning    Patient appropriate for transfer out of the ICU today?: No  Disposition: Continue Critical Care   Code Status: Level 1 - Full Code  ---------------------------------------------------------------------------------------  ICU CORE MEASURES    Prophylaxis   VTE Pharmacologic Prophylaxis: Pharmacologic VTE Prophylaxis contraindicated due to bleeding  VTE Mechanical Prophylaxis: sequential compression device  Stress Ulcer Prophylaxis: Pantoprazole IV     ABCDE Protocol (if indicated)  Plan to perform spontaneous awakening trial today? Not applicable  Plan to perform spontaneous breathing trial today? Not applicable  Obvious barriers to extubation? Not applicable  CAM-ICU: Negative    Invasive Devices Review  Invasive Devices  Report    Central Venous Catheter Line  Duration           Port A Cath 03/10/22 Right Chest 246 days          Peripheral Intravenous Line  Duration           Peripheral IV 11/09/22 Left;Proximal;Ventral (anterior) Forearm 3 days    Peripheral IV 11/09/22 Proximal;Right;Ventral (anterior) Forearm 3 days    Peripheral IV 11/10/22 Dorsal (posterior); Left Wrist 2 days    Peripheral IV 11/10/22 Dorsal (posterior); Right Wrist 2 days          Epidural Line  Duration           Epidural Catheter 11/07/22 4 days          Arterial Line  Duration           Arterial Line 11/08/22 Radial 3 days          Drain  Duration           Urethral Catheter Latex 16 Fr  4 days              Can any invasive devices be discontinued today?  No  ---------------------------------------------------------------------------------------  OBJECTIVE    Vitals   Vitals:    11/11/22 1730 11/11/22 1800 11/11/22 1900 11/11/22 2000   BP: 167/92 168/87 157/85 161/88   BP Location:       Pulse: 100 102 100 102   Resp: 22 (!) 27 22 (!) 25   Temp:       TempSrc: SpO2: 94% 94% 93% 94%   Weight:       Height:         Temp (24hrs), Av 1 °F (37 3 °C), Min:98 1 °F (36 7 °C), Max:99 32 °F (37 4 °C)  Current: Temperature: 98 8 °F (37 1 °C)  HR: 100  BP: 155/88  RR: 22  SpO2: 93    Respiratory:  SpO2: SpO2: 94 %  Nasal Cannula O2 Flow Rate (L/min): 2 L/min    Invasive/non-invasive ventilation settings   Respiratory  Report   Lab Data (Last 4 hours)    None         O2/Vent Data (Last 4 hours)    None                Physical Exam  Vitals and nursing note reviewed  Constitutional:       Appearance: He is well-developed  He is diaphoretic  HENT:      Head: Normocephalic and atraumatic  Mouth/Throat:      Mouth: Mucous membranes are moist       Pharynx: Oropharynx is clear  Eyes:      Extraocular Movements: Extraocular movements intact  Conjunctiva/sclera: Conjunctivae normal    Cardiovascular:      Rate and Rhythm: Normal rate and regular rhythm  Pulses: Normal pulses  Heart sounds: Normal heart sounds  No murmur heard  Pulmonary:      Effort: Pulmonary effort is normal  No respiratory distress  Breath sounds: Normal breath sounds  Abdominal:      Palpations: Abdomen is soft  Tenderness: There is abdominal tenderness  There is no guarding or rebound  Comments: Healing surgical incision noted   Musculoskeletal:         General: Normal range of motion  Cervical back: Normal range of motion and neck supple  No rigidity  Right lower leg: No edema  Left lower leg: No edema  Skin:     General: Skin is warm  Capillary Refill: Capillary refill takes 2 to 3 seconds  Neurological:      Mental Status: He is alert and oriented to person, place, and time  Mental status is at baseline               Laboratory and Diagnostics:  Results from last 7 days   Lab Units 22  0430 11/10/22  0450 11/10/22  0215 11/10/22  0000 22  2215 22  1135 22  0423 22  2230 22  1913 22  0631   WBC Thousand/uL 10 24* 14 02*  --  9 18 1 40* 19 22* 20 03*  --  10 68* 21 96*   HEMOGLOBIN g/dL 7 4* 8 4* 8 5* 8 3* 9 7* 9 1* 9 2*   < > 8 6* 10 1*   HEMATOCRIT % 21 6* 24 7* 25 0* 24 4* 29 6* 26 7* 26 6*   < > 26 6* 29 7*   PLATELETS Thousands/uL 146* 139*  --  133* 199 146* 149  --  225 327   NEUTROS PCT % 84*  --   --   --   --   --   --   --   --  88*   BANDS PCT %  --   --   --   --  6  --   --   --  4  --    MONOS PCT % 9  --   --   --   --   --   --   --   --  7   MONO PCT %  --   --   --   --  0*  --   --   --  0*  --     < > = values in this interval not displayed       Results from last 7 days   Lab Units 11/11/22  0430 11/10/22  0450 11/09/22  2215 11/09/22  0423 11/08/22  1913 11/08/22  0631 11/07/22  0835   SODIUM mmol/L 142 140 138 137 135 137  --    POTASSIUM mmol/L 4 0 4 3 4 7 4 0 4 5 4 9  --    CHLORIDE mmol/L 112* 109* 109* 106 106 108  --    CO2 mmol/L 23 23 21 23 22 21  --    CO2, I-STAT mmol/L  --   --   --   --   --   --  26   ANION GAP mmol/L 7 8 8 8 7 8  --    BUN mg/dL 40* 44* 38* 33* 31* 24  --    CREATININE mg/dL 1 70* 2 21* 2 33* 2 13* 2 28* 1 73*  --    CALCIUM mg/dL 8 2* 8 1* 8 9 8 5 8 1* 8 2*  --    GLUCOSE RANDOM mg/dL 156* 175* 167* 199* 217* 213*  --    ALT U/L 86* 137*  --  222* 263*  --   --    AST U/L 68* 127*  --  308* 379*  --   --    ALK PHOS U/L 181* 247*  --  192* 179*  --   --    ALBUMIN g/dL 1 9* 1 7*  --  2 1* 2 0*  --   --    TOTAL BILIRUBIN mg/dL 1 01* 0 93  --  1 17* 0 69  --   --      Results from last 7 days   Lab Units 11/11/22  0430 11/10/22  0450 11/09/22 2215 11/09/22  0423 11/08/22  1913 11/08/22  0631   MAGNESIUM mg/dL 2 7* 2 5 2 4 2 2 2 5 1 5*   PHOSPHORUS mg/dL 4 5 5 0* 5 7* 3 4 3 7  --       Results from last 7 days   Lab Units 11/09/22 2215   INR  1 24*   PTT seconds 37          Results from last 7 days   Lab Units 11/10/22  0450 11/10/22  0215 11/10/22  0000 11/09/22 2215 11/09/22  0737 11/09/22  0423 11/09/22  0207   LACTIC ACID mmol/L 2 0 2 4* 2 9* 5 6* 1 8 2 1* 2 6* ABG:  Results from last 7 days   Lab Units 11/10/22  0700   PH ART  7 393   PCO2 ART mm Hg 35 2*   PO2 ART mm Hg 96 7   HCO3 ART mmol/L 21 0*   BASE EXC ART mmol/L -3 5   ABG SOURCE  Line, Arterial     VBG:  Results from last 7 days   Lab Units 11/10/22  0700 11/08/22  2330 11/08/22  2247   PH RUMA   --   --  7 429*   PCO2 RUMA mm Hg  --   --  33 8*   PO2 RUMA mm Hg  --   --  49 3*   HCO3 RUMA mmol/L  --   --  21 9*   BASE EXC RUMA mmol/L  --   --  -2 1   ABG SOURCE  Line, Arterial   < >  --     < > = values in this interval not displayed  Micro  Results from last 7 days   Lab Units 11/10/22  0450 11/08/22  2230   BLOOD CULTURE  No Growth at 24 hrs  No Growth at 24 hrs  Escherichia coli ESBL*  Escherichia coli ESBL*   GRAM STAIN RESULT   --  Gram negative rods*  Gram negative rods*     Imaging: I have personally reviewed pertinent reports  Intake and Output  I/O       11/10 0701  11/11 0700 11/11 0701 11/12 0700    P  O  0 720    I V  (mL/kg) 2721 8 (22 5) 625 3 (5 2)    NG/GT 0 0    IV Piggyback 300 100    Total Intake(mL/kg) 3021 8 (25) 1445 3 (11 9)    Urine (mL/kg/hr) 2130 (0 7) 3570 (1 2)    Emesis/NG output 0 0    Stool 0 0    Total Output 2130 3570    Net +891 8 -2124 7          Unmeasured Stool Occurrence 0 x 0 x        UOP: 3570 mL, 1 2 ml/kg/hr     Height and Weights   Height: 5' 8" (172 7 cm)  IBW (Ideal Body Weight): 68 4 kg  Body mass index is 40 46 kg/m²  Weight (last 2 days)     Date/Time Weight    11/11/22 0400 121 (266 1)    11/10/22 0400 118 (260 58)            Nutrition       Diet Orders   (From admission, onward)             Start     Ordered    11/11/22 1149  Diet NPO; Sips of clear liquids  Diet effective now        References:    Nutrtion Support Algorithm Enteral vs  Parenteral   Question Answer Comment   Diet Type NPO    NPO Except: Sips of clear liquids    RD to adjust diet per protocol?  No        11/11/22 1149                    Active Medications  Scheduled Meds:  Current Facility-Administered Medications   Medication Dose Route Frequency Provider Last Rate   • acetaminophen  650 mg Rectal Q4H PRN Mukund Raphael, DO     • amLODIPine  5 mg Oral BID Javier Rollins MD     • chlorhexidine  15 mL Mouth/Throat Q12H [facility] Alicia Faye DO     • ertapenem  1,000 mg Intravenous Q24H GAURANG Moraes 1,000 mg (11/11/22 1502)   • fentanyl citrate (PF)  50 mcg Intravenous Q1H PRN Sylvie Faye DO     • glycopyrrolate  0 2 mg Intravenous Q4H PRN Sahara Willson MD     • haloperidol lactate  2 mg Intramuscular Q30 Min PRN Sahara Willson MD     • heparin (porcine)  5,000 Units Subcutaneous [facility] Isabela Preston DO     • insulin lispro  2-12 Units Subcutaneous Q6H [facility] GAURANG Moraes     • iohexol  100 mL Intravenous Once in imaging Meagan Stewart, DO     • ketamine  0 1 mg/kg/hr (Ideal) Intravenous Continuous Sahara Willson MD 0 1 mg/kg/hr (11/11/22 1829)   • labetalol  10 mg Intravenous Q4H PRN Luca Stephenson DO     • LORazepam  1 mg Intravenous Q1H PRN Sahara Willson MD     • ondansetron  4 mg Intravenous Q4H PRN Madeleine Vera MD     • pantoprazole  40 mg Intravenous Q24H [facility] Da Preston DO     • phenol  1 spray Mouth/Throat Q2H PRN Missael Lomeli MD     • ropivacaine 0 1% and fentaNYL 2 mcg/mL   Epidural Continuous Sahara Willson MD       Continuous Infusions:  ketamine, 0 1 mg/kg/hr (Ideal), Last Rate: 0 1 mg/kg/hr (11/11/22 1829)  ropivacaine 0 1% and fentaNYL 2 mcg/mL,       PRN Meds:   acetaminophen, 650 mg, Q4H PRN  fentanyl citrate (PF), 50 mcg, Q1H PRN  glycopyrrolate, 0 2 mg, Q4H PRN  haloperidol lactate, 2 mg, Q30 Min PRN  iohexol, 100 mL, Once in imaging  labetalol, 10 mg, Q4H PRN  LORazepam, 1 mg, Q1H PRN  ondansetron, 4 mg, Q4H PRN  phenol, 1 spray, Q2H PRN        Allergies   Allergies   Allergen Reactions   • Shellfish-Derived Products - Food Allergy Anaphylaxis • Erythromycin GI Intolerance     ---------------------------------------------------------------------------------------  Advance Directive and Living Will:      Power of :    POLST:    ---------------------------------------------------------------------------------------  Care Time Delivered:   No Critical Care time spent     [facility], DO      Portions of the record may have been created with voice recognition software  Occasional wrong word or "sound a like" substitutions may have occurred due to the inherent limitations of voice recognition software    Read the chart carefully and recognize, using context, where substitutions have occurred

## 2022-11-12 NOTE — PROGRESS NOTES
Cardiology Progress Note - Nolvia Chaney 61 y o  male MRN: [id number]    Unit/Bed#: MICU 02 Encounter: [id number]        Subjective:   Extubated  No dyspnea  Review of Systems   Constitutional: Positive for malaise/fatigue  Cardiovascular: Negative for chest pain, leg swelling and palpitations  Respiratory: Negative for shortness of breath  Objective:   Vitals: Blood pressure 123/88, pulse (!) 110, temperature 98 5 °F (36 9 °C), resp  rate (!) 28, height 5' 8" (1 727 m), weight 121 kg (266 lb 1 5 oz), SpO2 96 %  , Body mass index is 40 46 kg/m² ,   Orthostatic Blood Pressures    Flowsheet Row Most Recent Value   Blood Pressure 123/88 filed at 11/12/2022 0800   Patient Position - Orthostatic VS Lying filed at 11/11/2022 1328         Systolic (13CXG), TWB:320 , Min:123 , XXO:236     Diastolic (52SGK), SMO:43, Min:75, Max:110      Intake/Output Summary (Last 24 hours) at 11/12/2022 0854  Last data filed at 11/12/2022 0701  Gross per 24 hour   Intake 1296 06 ml   Output 3370 ml   Net -2073 94 ml     Weight (last 2 days)     Date/Time Weight    11/11/22 0400 121 (266 1)    11/10/22 0400 118 (260 58)              Telemetry Review: NSR    Physical Exam  Cardiovascular:      Rate and Rhythm: Normal rate and regular rhythm  Heart sounds: Normal heart sounds  No murmur heard  No friction rub  No gallop  Pulmonary:      Breath sounds: Normal breath sounds  No wheezing or rales             Laboratory Results:        CBC with diff:   Results from last 7 days   Lab Units 11/12/22  0604 11/11/22  0430 11/10/22  0450 11/10/22  0215 11/10/22  0000 11/09/22  2215 11/09/22  1135 11/09/22  0423 11/08/22  1913 11/08/22  0631   WBC Thousand/uL 11 77* 10 24* 14 02*  --  9 18 1 40* 19 22* 20 03*   < > 21 96*   HEMOGLOBIN g/dL 7 0* 7 4* 8 4* 8 5* 8 3* 9 7* 9 1* 9 2*   < > 10 1*   HEMATOCRIT % 20 8* 21 6* 24 7* 25 0* 24 4* 29 6* 26 7* 26 6*   < > 29 7*   MCV fL 91 91 92  --  91 92 90 90   < > 93   PLATELETS Thousands/uL 198 146* 139*  --  133* 199 146* 149   < > 327   MCH pg 30 6 31 1 31 2  --  31 0 30 2 30 6 31 2   < > 31 5   MCHC g/dL 33 7 34 3 34 0  --  34 0 32 8 34 1 34 6   < > 34 0   RDW % 13 7 13 6 13 6  --  13 5 13 4 13 5 13 2   < > 12 4   MPV fL 10 7 10 5 10 7  --  10 8 10 3 10 1 10 1   < > 10 3   NRBC AUTO /100 WBCs 0 0  --   --   --   --   --  0  --  0    < > = values in this interval not displayed  CMP:  Results from last 7 days   Lab Units 11/12/22  0604 11/11/22  0430 11/10/22  0450 11/09/22  2215 11/09/22  0423 11/08/22  1913 11/08/22  0631 11/07/22  0835   POTASSIUM mmol/L 3 9 4 0 4 3 4 7 4 0 4 5 4 9  --    CHLORIDE mmol/L 111* 112* 109* 109* 106 106 108  --    CO2 mmol/L 23 23 23 21 23 22 21  --    CO2, I-STAT mmol/L  --   --   --   --   --   --   --  26   BUN mg/dL 39* 40* 44* 38* 33* 31* 24  --    CREATININE mg/dL 1 59* 1 70* 2 21* 2 33* 2 13* 2 28* 1 73*  --    GLUCOSE, ISTAT mg/dl  --   --   --   --   --   --   --  169*   CALCIUM mg/dL 8 2* 8 2* 8 1* 8 9 8 5 8 1* 8 2*  --    AST U/L 65* 68* 127*  --  308* 379*  --   --    ALT U/L 62 86* 137*  --  222* 263*  --   --    ALK PHOS U/L 304* 181* 247*  --  192* 179*  --   --    EGFR ml/min/1 73sq m 47 43 31 29 33 30 42  --          BMP:  Results from last 7 days   Lab Units 11/12/22  0604 11/11/22  0430 11/10/22  0450 11/09/22  2215 11/09/22  0423 11/08/22  1913 11/08/22  0631 11/07/22  0835   POTASSIUM mmol/L 3 9 4 0 4 3 4 7 4 0 4 5 4 9  --    CHLORIDE mmol/L 111* 112* 109* 109* 106 106 108  --    CO2 mmol/L 23 23 23 21 23 22 21  --    CO2, I-STAT mmol/L  --   --   --   --   --   --   --  26   BUN mg/dL 39* 40* 44* 38* 33* 31* 24  --    CREATININE mg/dL 1 59* 1 70* 2 21* 2 33* 2 13* 2 28* 1 73*  --    GLUCOSE, ISTAT mg/dl  --   --   --   --   --   --   --  169*   CALCIUM mg/dL 8 2* 8 2* 8 1* 8 9 8 5 8 1* 8 2*  --        BNP:   Results Reviewed     None        No results for input(s): BNP in the last 72 hours      Magnesium:   Results from last 7 days   Lab Units 11/12/22  0604 11/11/22  0430 11/10/22  0450 11/09/22  2215 11/09/22  0423 11/08/22  1913 11/08/22  0631   MAGNESIUM mg/dL 2 6 2 7* 2 5 2 4 2 2 2 5 1 5*       Coags:   Results from last 7 days   Lab Units 11/12/22  0604 11/09/22  2215   PTT seconds  --  37   INR  1 10 1 24*       TSH:       Lipid Profile:             Cardiac testing:   No results found for this or any previous visit  No results found for this or any previous visit  No results found for this or any previous visit  No results found for this or any previous visit        Meds/Allergies   Current Facility-Administered Medications   Medication Dose Route Frequency Provider Last Rate   • acetaminophen  650 mg Rectal Q4H PRN Jad Alvarez DO     • amLODIPine  5 mg Oral BID Vicente Johnson MD     • calcium gluconate  2 g Intravenous Once Sandra Cartwright DO 2 g (11/12/22 3334)   • chlorhexidine  15 mL Mouth/Throat Q12H [facility] Alicia Faye DO     • ertapenem  1,000 mg Intravenous Q24H [facility] GAURANG Quintanilla 1,000 mg (11/11/22 1502)   • fentanyl citrate (PF)  50 mcg Intravenous Q1H PRN Harding Meckel Depope, DO     • glycopyrrolate  0 2 mg Intravenous Q4H PRN Vaughn Guillen MD     • haloperidol lactate  2 mg Intramuscular Q30 Min PRN Vaughn Guillen MD     • heparin (porcine)  5,000 Units [facility]cutaneous [facility]     • insulin lispro  2-12 Units Subcutaneous Q6H [facility] GAURANG Corbin     • iohexol  100 mL Intravenous Once in imaging Kole Chavez DO     • ketamine  0 1 mg/kg/hr (Ideal) Intravenous Continuous Vaughn Guillen MD 0 1 mg/kg/hr (11/11/22 1579)   • labetalol  10 mg Intravenous Q4H PRN Violet Preston DO     • LORazepam  1 mg Intravenous Q1H PRN Vaughn Guillen MD     • ondansetron  4 mg Intravenous Q4H PRN Pa Kumari MD     • pantoprazole  40 mg Intravenous Q24H [facility] Da Preston DO     • phenol  1 spray Mouth/Throat Q2H PRN Ivonne Camarena MD     • ropivacaine 0 1% and fentaNYL 2 mcg/mL   Epidural Continuous Kwesi Norman MD       ketamine, 0 1 mg/kg/hr (Ideal), Last Rate: 0 1 mg/kg/hr (11/11/22 1829)  ropivacaine 0 1% and fentaNYL 2 mcg/mL,       Medications Prior to Admission   Medication   • amLODIPine (NORVASC) 5 mg tablet   • aspirin 81 MG tablet   • atorvastatin (LIPITOR) 40 mg tablet   • Cholecalciferol (VITAMIN D3 PO)   • DULoxetine (Cymbalta) 30 mg delayed release capsule   • glyBURIDE-metFORMIN (GLUCOVANCE) 5-500 MG per tablet   • Januvia 100 MG tablet   • Lantus SoloStar 100 units/mL SOPN   • lisinopril (ZESTRIL) 40 mg tablet   • methocarbamol (ROBAXIN) 500 mg tablet   • metoclopramide (REGLAN) 10 mg tablet   • Multiple Vitamins-Minerals (multivitamin with minerals) tablet   • pantoprazole (PROTONIX) 40 mg tablet   • Continuous Blood Gluc Sensor (FreeStyle Parrish 14 Day Sensor) MISC   • Insulin Pen Needle (B-D UF III MINI PEN NEEDLES) 31G X 5 MM MISC   • Needle, Disp, (HYPODERMIC NEEDLE) 23G X 1-1/2" MISC   • ondansetron (ZOFRAN) 4 mg tablet   • Ostomy Supplies MISC       Assessment:  Principal Problem:    Colon cancer metastasized to liver [facility])  Active Problems:    Malignant neoplasm of transverse colon (HCC)    Encephalopathy        Impression:  1  L shoulder pain - musculoskeletal   2  Paroxysmal atrial tachycardia - likely precipitated by stress from surgery  No further episodes  3  HTN - controlled  4  Respiratory failure - extubated   5  Sepsis - ESBL on abx  6  ALEXY on CKD - improving  7  Anemia     Recommendations:  1  Continue current medications from cardiac standpoint  2  Consider PRBC txf  3  Diuretics PRN  4  Will sign off for now  Please call with questions

## 2022-11-12 NOTE — QUICK NOTE
Progress Note - Palliative & Supportive Care     24 hour events reviewed, patient now extubated  APS still managing pain with epidural and low dose ketamine infusions  [facility] will continue to follow peripherally until epidural is removed and then will engage in ongoing pain management  Please notify on-call provider with timing of epidural removal if planned for this weekend  Thank you      Mirtha Pascual DO  Palliative and Supportive Care  [phone]

## 2022-11-13 ENCOUNTER — APPOINTMENT (OUTPATIENT)
Dept: RADIOLOGY | Facility: HOSPITAL | Age: 57
End: 2022-11-13

## 2022-11-13 ENCOUNTER — APPOINTMENT (INPATIENT)
Dept: RADIOLOGY | Facility: HOSPITAL | Age: 57
End: 2022-11-13

## 2022-11-13 LAB
ABO GROUP BLD: NORMAL
ALBUMIN SERPL BCP-MCNC: 1.8 G/DL (ref 3.5–5)
ALP SERPL-CCNC: 265 U/L (ref 46–116)
ALT SERPL W P-5'-P-CCNC: 61 U/L (ref 12–78)
ANION GAP SERPL CALCULATED.3IONS-SCNC: 4 MMOL/L (ref 4–13)
ANISOCYTOSIS BLD QL SMEAR: PRESENT
AST SERPL W P-5'-P-CCNC: 69 U/L (ref 5–45)
ATRIAL RATE: 139 BPM
BASE EXCESS BLDA CALC-SCNC: 0.6 MMOL/L
BASOPHILS # BLD AUTO: 0.04 THOUSANDS/ÂΜL (ref 0–0.1)
BASOPHILS # BLD MANUAL: 0.16 THOUSAND/UL (ref 0–0.1)
BASOPHILS NFR MAR MANUAL: 1 % (ref 0–1)
BILIRUB SERPL-MCNC: 1.08 MG/DL (ref 0.2–1)
BLD GP AB SCN SERPL QL: NEGATIVE
BUN SERPL-MCNC: 45 MG/DL (ref 5–25)
CA-I BLD-SCNC: 1.09 MMOL/L (ref 1.12–1.32)
CALCIUM ALBUM COR SERPL-MCNC: 9.9 MG/DL (ref 8.3–10.1)
CALCIUM SERPL-MCNC: 8.1 MG/DL (ref 8.3–10.1)
CHLORIDE SERPL-SCNC: 112 MMOL/L (ref 96–108)
CO2 SERPL-SCNC: 26 MMOL/L (ref 21–32)
CREAT SERPL-MCNC: 1.48 MG/DL (ref 0.6–1.3)
EOSINOPHIL # BLD MANUAL: 0.47 THOUSAND/UL (ref 0–0.4)
EOSINOPHIL NFR BLD MANUAL: 3 % (ref 0–6)
ERYTHROCYTE [DISTWIDTH] IN BLOOD BY AUTOMATED COUNT: 13.7 % (ref 11.6–15.1)
ERYTHROCYTE [DISTWIDTH] IN BLOOD BY AUTOMATED COUNT: 14.3 % (ref 11.6–15.1)
GFR SERPL CREATININE-BSD FRML MDRD: 51 ML/MIN/1.73SQ M
GLUCOSE SERPL-MCNC: 191 MG/DL (ref 65–140)
GLUCOSE SERPL-MCNC: 197 MG/DL (ref 65–140)
GLUCOSE SERPL-MCNC: 221 MG/DL (ref 65–140)
GLUCOSE SERPL-MCNC: 222 MG/DL (ref 65–140)
GLUCOSE SERPL-MCNC: 235 MG/DL (ref 65–140)
HCO3 BLDA-SCNC: 24.6 MMOL/L (ref 22–28)
HCT VFR BLD AUTO: 20.3 % (ref 36.5–49.3)
HCT VFR BLD AUTO: 24 % (ref 36.5–49.3)
HGB BLD-MCNC: 6.7 G/DL (ref 12–17)
HGB BLD-MCNC: 8 G/DL (ref 12–17)
IMM GRANULOCYTES # BLD AUTO: >0.5 THOUSAND/UL (ref 0–0.2)
LYMPHOCYTES # BLD AUTO: 0.63 THOUSAND/UL (ref 0.6–4.47)
LYMPHOCYTES # BLD AUTO: 4 % (ref 14–44)
MAGNESIUM SERPL-MCNC: 2.5 MG/DL (ref 1.6–2.6)
MCH RBC QN AUTO: 30.3 PG (ref 26.8–34.3)
MCH RBC QN AUTO: 30.4 PG (ref 26.8–34.3)
MCHC RBC AUTO-ENTMCNC: 33 G/DL (ref 31.4–37.4)
MCHC RBC AUTO-ENTMCNC: 33.3 G/DL (ref 31.4–37.4)
MCV RBC AUTO: 91 FL (ref 82–98)
MCV RBC AUTO: 92 FL (ref 82–98)
MONOCYTES # BLD AUTO: 2.03 THOUSAND/UL (ref 0–1.22)
MONOCYTES NFR BLD: 13 % (ref 4–12)
NASAL CANNULA: 6
NEUTROPHILS # BLD MANUAL: 12.21 THOUSAND/UL (ref 1.85–7.62)
NEUTS SEG NFR BLD AUTO: 78 % (ref 43–75)
NON VENT TYPE- NRB: ABNORMAL
NRBC BLD AUTO-RTO: 0 /100 WBCS
O2 CT BLDA-SCNC: 8.3 ML/DL (ref 16–23)
OXYHGB MFR BLDA: 85.4 % (ref 94–97)
PCO2 BLDA: 36.2 MM HG (ref 36–44)
PH BLDA: 7.45 [PH] (ref 7.35–7.45)
PHOSPHATE SERPL-MCNC: 4.2 MG/DL (ref 2.7–4.5)
PLATELET # BLD AUTO: 230 THOUSANDS/UL (ref 149–390)
PLATELET # BLD AUTO: 243 THOUSANDS/UL (ref 149–390)
PLATELET BLD QL SMEAR: ADEQUATE
PMV BLD AUTO: 10.5 FL (ref 8.9–12.7)
PMV BLD AUTO: 10.7 FL (ref 8.9–12.7)
PO2 BLDA: 57.6 MM HG (ref 75–129)
POIKILOCYTOSIS BLD QL SMEAR: PRESENT
POTASSIUM SERPL-SCNC: 3.7 MMOL/L (ref 3.5–5.3)
PR INTERVAL: 0 MS
PROT SERPL-MCNC: 5.6 G/DL (ref 6.4–8.4)
QRS AXIS: 216 DEGREES
QRSD INTERVAL: 125 MS
QT INTERVAL: 396 MS
QTC INTERVAL: 603 MS
RBC # BLD AUTO: 2.21 MILLION/UL (ref 3.88–5.62)
RBC # BLD AUTO: 2.63 MILLION/UL (ref 3.88–5.62)
RBC MORPH BLD: PRESENT
RH BLD: POSITIVE
SODIUM SERPL-SCNC: 142 MMOL/L (ref 135–147)
SPECIMEN EXPIRATION DATE: NORMAL
SPECIMEN SOURCE: ABNORMAL
T WAVE AXIS: 0 DEGREES
VARIANT LYMPHS # BLD AUTO: 1 %
VENTRICULAR RATE: 139 BPM
WBC # BLD AUTO: 15.65 THOUSAND/UL (ref 4.31–10.16)
WBC # BLD AUTO: 16.15 THOUSAND/UL (ref 4.31–10.16)

## 2022-11-13 RX ORDER — LEVALBUTEROL 1.25 MG/.5ML
1.25 SOLUTION, CONCENTRATE RESPIRATORY (INHALATION)
Status: DISCONTINUED | OUTPATIENT
Start: 2022-11-13 | End: 2022-11-13

## 2022-11-13 RX ORDER — AMLODIPINE BESYLATE 10 MG/1
10 TABLET ORAL 2 TIMES DAILY
Status: DISCONTINUED | OUTPATIENT
Start: 2022-11-13 | End: 2022-11-16

## 2022-11-13 RX ORDER — FUROSEMIDE 10 MG/ML
20 INJECTION INTRAMUSCULAR; INTRAVENOUS ONCE
Status: COMPLETED | OUTPATIENT
Start: 2022-11-13 | End: 2022-11-13

## 2022-11-13 RX ORDER — SODIUM CHLORIDE FOR INHALATION 3 %
4 VIAL, NEBULIZER (ML) INHALATION
Status: DISCONTINUED | OUTPATIENT
Start: 2022-11-13 | End: 2022-11-14

## 2022-11-13 RX ORDER — INSULIN LISPRO 100 [IU]/ML
1-6 INJECTION, SOLUTION INTRAVENOUS; SUBCUTANEOUS EVERY 6 HOURS SCHEDULED
Status: DISCONTINUED | OUTPATIENT
Start: 2022-11-14 | End: 2022-11-14

## 2022-11-13 RX ORDER — FENTANYL CITRATE 50 UG/ML
50 INJECTION, SOLUTION INTRAMUSCULAR; INTRAVENOUS ONCE
Status: COMPLETED | OUTPATIENT
Start: 2022-11-13 | End: 2022-11-13

## 2022-11-13 RX ORDER — INSULIN LISPRO 100 [IU]/ML
4-20 INJECTION, SOLUTION INTRAVENOUS; SUBCUTANEOUS
Status: DISCONTINUED | OUTPATIENT
Start: 2022-11-13 | End: 2022-11-13

## 2022-11-13 RX ORDER — FENTANYL CITRATE 50 UG/ML
INJECTION, SOLUTION INTRAMUSCULAR; INTRAVENOUS
Status: COMPLETED
Start: 2022-11-13 | End: 2022-11-13

## 2022-11-13 RX ORDER — LEVALBUTEROL 1.25 MG/.5ML
1.25 SOLUTION, CONCENTRATE RESPIRATORY (INHALATION)
Status: DISCONTINUED | OUTPATIENT
Start: 2022-11-13 | End: 2022-11-14

## 2022-11-13 RX ORDER — AMLODIPINE BESYLATE 5 MG/1
5 TABLET ORAL ONCE
Status: COMPLETED | OUTPATIENT
Start: 2022-11-13 | End: 2022-11-13

## 2022-11-13 RX ORDER — SODIUM CHLORIDE FOR INHALATION 3 %
4 VIAL, NEBULIZER (ML) INHALATION
Status: DISCONTINUED | OUTPATIENT
Start: 2022-11-13 | End: 2022-11-13

## 2022-11-13 RX ADMIN — OXYCODONE HYDROCHLORIDE 5 MG: 5 TABLET ORAL at 10:41

## 2022-11-13 RX ADMIN — INSULIN LISPRO 8 UNITS: 100 INJECTION, SOLUTION INTRAVENOUS; SUBCUTANEOUS at 12:27

## 2022-11-13 RX ADMIN — AMLODIPINE BESYLATE 5 MG: 5 TABLET ORAL at 10:37

## 2022-11-13 RX ADMIN — HEPARIN SODIUM 5000 UNITS: 5000 INJECTION INTRAVENOUS; SUBCUTANEOUS at 14:48

## 2022-11-13 RX ADMIN — INSULIN LISPRO 2 UNITS: 100 INJECTION, SOLUTION INTRAVENOUS; SUBCUTANEOUS at 06:36

## 2022-11-13 RX ADMIN — IOHEXOL 100 ML: 350 INJECTION, SOLUTION INTRAVENOUS at 05:05

## 2022-11-13 RX ADMIN — ATORVASTATIN CALCIUM 40 MG: 40 TABLET, FILM COATED ORAL at 17:11

## 2022-11-13 RX ADMIN — LEVALBUTEROL HYDROCHLORIDE 1.25 MG: 1.25 SOLUTION, CONCENTRATE RESPIRATORY (INHALATION) at 20:52

## 2022-11-13 RX ADMIN — PANTOPRAZOLE SODIUM 40 MG: 40 TABLET, DELAYED RELEASE ORAL at 05:46

## 2022-11-13 RX ADMIN — AMLODIPINE BESYLATE 5 MG: 5 TABLET ORAL at 09:00

## 2022-11-13 RX ADMIN — FENTANYL CITRATE 50 MCG: 50 INJECTION INTRAMUSCULAR; INTRAVENOUS at 04:15

## 2022-11-13 RX ADMIN — METHOCARBAMOL 500 MG: 500 TABLET ORAL at 17:11

## 2022-11-13 RX ADMIN — Medication 10 MG: at 10:25

## 2022-11-13 RX ADMIN — DULOXETINE HYDROCHLORIDE 60 MG: 60 CAPSULE, DELAYED RELEASE ORAL at 10:00

## 2022-11-13 RX ADMIN — FUROSEMIDE 20 MG: 10 INJECTION, SOLUTION INTRAMUSCULAR; INTRAVENOUS at 05:23

## 2022-11-13 RX ADMIN — SODIUM CHLORIDE SOLN NEBU 3% 4 ML: 3 NEBU SOLN at 15:09

## 2022-11-13 RX ADMIN — SODIUM CHLORIDE SOLN NEBU 3% 4 ML: 3 NEBU SOLN at 20:52

## 2022-11-13 RX ADMIN — ERTAPENEM SODIUM 1000 MG: 1 INJECTION, POWDER, LYOPHILIZED, FOR SOLUTION INTRAMUSCULAR; INTRAVENOUS at 17:22

## 2022-11-13 RX ADMIN — ACETAMINOPHEN 975 MG: 325 TABLET, FILM COATED ORAL at 21:58

## 2022-11-13 RX ADMIN — ACETAMINOPHEN 975 MG: 325 TABLET, FILM COATED ORAL at 05:46

## 2022-11-13 RX ADMIN — METHOCARBAMOL 500 MG: 500 TABLET ORAL at 10:00

## 2022-11-13 RX ADMIN — AMLODIPINE BESYLATE 10 MG: 10 TABLET ORAL at 17:11

## 2022-11-13 RX ADMIN — OXYCODONE HYDROCHLORIDE 5 MG: 5 TABLET ORAL at 14:48

## 2022-11-13 RX ADMIN — HEPARIN SODIUM 5000 UNITS: 5000 INJECTION INTRAVENOUS; SUBCUTANEOUS at 05:46

## 2022-11-13 RX ADMIN — LEVALBUTEROL HYDROCHLORIDE 1.25 MG: 1.25 SOLUTION, CONCENTRATE RESPIRATORY (INHALATION) at 15:09

## 2022-11-13 RX ADMIN — FENTANYL CITRATE 50 MCG: 50 INJECTION, SOLUTION INTRAMUSCULAR; INTRAVENOUS at 04:15

## 2022-11-13 RX ADMIN — OXYCODONE HYDROCHLORIDE 10 MG: 10 TABLET ORAL at 20:53

## 2022-11-13 RX ADMIN — HEPARIN SODIUM 5000 UNITS: 5000 INJECTION INTRAVENOUS; SUBCUTANEOUS at 21:58

## 2022-11-13 RX ADMIN — ACETAMINOPHEN 975 MG: 325 TABLET, FILM COATED ORAL at 14:48

## 2022-11-13 RX ADMIN — INSULIN LISPRO 8 UNITS: 100 INJECTION, SOLUTION INTRAVENOUS; SUBCUTANEOUS at 17:22

## 2022-11-13 NOTE — PROGRESS NOTES
Progress Note - Surgical Oncology  Demetrio Zamora 61 y o  male MRN: [id number]  [facility] Encounter: [id number]      Assessment:  60yo M POD3 s/p exploratory laparotomy, transverse colon resection, reversal of loop colostomy, peritoneal biopsy, and segment 3 hepatic resection w/ a post-operative course complicated by acute blood loss anemia due to LUQ hematoma and recurrent acute encephalopathy and hypoxic respiratory failure requiring endotracheal intubation, now extubated  Afebrile, tachycardic into the low 1 teens, tachypneic into the 20s and had op hypoxic episode overnight desaturating into the 60s and 70s requiring non-rebreather to restore saturations  Placed on high flow nasal cannula 50 and 100%  Blood gas:  7 4/36 2/57 6/24 6  Creatinine 1 48 from 1 59  White count 15 6 from 11 7 and hemoglobin 6 7 from 7 receiving 1 unit PRBCs this morning  UOP; 2 L  Stools x1           Plan:  Advance diet as tolerated  Diuresis per ICU-received 50 total of lasix yesterday  Continue to monitor h/h after 1 unit  Follow-up CT PE chest abdomen pelvis read  DVT px  Continue abx per ID  Appreciate ICU level of care  Pain control per palliative; aps dc'd epidural yesterday      Subjective/Objective     Subjective:   Events as noted above  Patient received will 30 of Lasix during the day and 20 overnight when desaturation episode occurred  CT chest abdomen pelvis obtained overnight      Objective:     Blood pressure 141/86, pulse 94, temperature 98 °F (36 7 °C), temperature source Oral, resp  rate 17, height 5' 8" (1 727 m), weight 121 kg (266 lb 1 5 oz), SpO2 95 %  ,Body mass index is 40 46 kg/m²  Physical Exam  Vitals reviewed  Constitutional:       General: He is not in acute distress  Appearance: He is not ill-appearing, toxic-appearing or diaphoretic  HENT:      Head: Normocephalic and atraumatic  Eyes:      Extraocular Movements: Extraocular movements intact     Cardiovascular:      Rate and Rhythm: Normal rate  Pulmonary:      Effort: Pulmonary effort is normal  No respiratory distress  Abdominal:      General: There is distension  Palpations: Abdomen is soft  Tenderness: There is no abdominal tenderness  There is no guarding or rebound  Comments: Incision clean, dry and intact   Skin:     General: Skin is warm and dry  Neurological:      Mental Status: He is alert and oriented to person, place, and time  Mental status is at baseline  Psychiatric:         Mood and Affect: Mood normal              Intake/Output Summary (Last 24 hours) at 11/12/2022 2316  Last data filed at 11/12/2022 2101  Gross per 24 hour   Intake 384 61 ml   Output 1305 ml   Net -920 39 ml       Invasive Devices  Report    Central Venous Catheter Line  Duration           Port A Cath 03/10/22 Right Chest 247 days          Peripheral Intravenous Line  Duration           Peripheral IV 11/09/22 Left;Proximal;Ventral (anterior) Forearm 3 days    Peripheral IV 11/10/22 Dorsal (posterior); Left Wrist 2 days    Peripheral IV 11/10/22 Dorsal (posterior); Right Wrist 2 days          Arterial Line  Duration           Arterial Line 11/08/22 Radial 3 days          Drain  Duration           Urethral Catheter Latex 16 Fr  5 days                I have personally reviewed pertinent lab results    , CBC:   Lab Results   Component Value Date    WBC 11 77 (H) 11/12/2022    HGB 7 0 (L) 11/12/2022    HCT 20 8 (L) 11/12/2022    MCV 91 11/12/2022     11/12/2022    MCH 30 6 11/12/2022    MCHC 33 7 11/12/2022    RDW 13 7 11/12/2022    MPV 10 7 11/12/2022    NRBC 0 11/12/2022   , CMP:   Lab Results   Component Value Date    SODIUM 143 11/12/2022    K 3 9 11/12/2022     (H) 11/12/2022    CO2 23 11/12/2022    BUN 39 (H) 11/12/2022    CREATININE 1 59 (H) 11/12/2022    CALCIUM 8 2 (L) 11/12/2022    AST 65 (H) 11/12/2022    ALT 62 11/12/2022    ALKPHOS 304 (H) 11/12/2022    EGFR 47 11/12/2022   , Coagulation:   Lab Results Component Value Date    INR 1 10 11/12/2022

## 2022-11-13 NOTE — PLAN OF CARE
Problem: MOBILITY - ADULT  Goal: Maintain or return to baseline ADL function  Description: INTERVENTIONS:  -  Assess patient's ability to carry out ADLs; assess patient's baseline for ADL function and identify physical deficits which impact ability to perform ADLs (bathing, care of mouth/teeth, toileting, grooming, dressing, etc )  - Assess/evaluate cause of self-care deficits   - Assess range of motion  - Assess patient's mobility; develop plan if impaired  - Assess patient's need for assistive devices and provide as appropriate  - Encourage maximum independence but intervene and supervise when necessary  - Involve family in performance of ADLs  - Assess for home care needs following discharge   - Consider OT consult to assist with ADL evaluation and planning for discharge  - Provide patient education as appropriate  Outcome: Progressing  Goal: Maintains/Returns to pre admission functional level  Description: INTERVENTIONS:  - Perform BMAT or MOVE assessment daily    - Set and communicate daily mobility goal to care team and patient/family/caregiver  - Collaborate with rehabilitation services on mobility goals if consulted  - Perform Range of Motion  times a day  - Reposition patient every  hours    - Dangle patient  times a day  - Stand patient  times a day  - Ambulate patient  times a day  - Out of bed to chair  times a day   - Out of bed for meals  times a day  - Out of bed for toileting  - Record patient progress and toleration of activity level   Outcome: Progressing     Problem: Prexisting or High Potential for Compromised Skin Integrity  Goal: Skin integrity is maintained or improved  Description: INTERVENTIONS:  - Identify patients at risk for skin breakdown  - Assess and monitor skin integrity  - Assess and monitor nutrition and hydration status  - Monitor labs   - Assess for incontinence   - Turn and reposition patient  - Assist with mobility/ambulation  - Relieve pressure over bony prominences  - Avoid friction and shearing  - Provide appropriate hygiene as needed including keeping skin clean and dry  - Evaluate need for skin moisturizer/barrier cream  - Collaborate with interdisciplinary team   - Patient/family teaching  - Consider wound care consult   Outcome: Progressing     Problem: Potential for Falls  Goal: Patient will remain free of falls  Description: INTERVENTIONS:  - Educate patient/family on patient safety including physical limitations  - Instruct patient to call for assistance with activity   - Consult OT/PT to assist with strengthening/mobility   - Keep Call bell within reach  - Keep bed low and locked with side rails adjusted as appropriate  - Keep care items and personal belongings within reach  - Initiate and maintain comfort rounds  - Make Fall Risk Sign visible to staff  - Offer Toileting every  Hours, in advance of need  - Initiate/Maintain alarm  - Obtain necessary fall risk management equipment:   - Apply yellow socks and bracelet for high fall risk patients  - Consider moving patient to room near nurses station  Outcome: Progressing     Problem: Nutrition/Hydration-ADULT  Goal: Nutrient/Hydration intake appropriate for improving, restoring or maintaining nutritional needs  Description: Monitor and assess patient's nutrition/hydration status for malnutrition  Collaborate with interdisciplinary team and initiate plan and interventions as ordered  Monitor patient's weight and dietary intake as ordered or per policy  Utilize nutrition screening tool and intervene as necessary  Determine patient's food preferences and provide high-protein, high-caloric foods as appropriate       INTERVENTIONS:  - Monitor oral intake, urinary output, labs, and treatment plans  - Assess nutrition and hydration status and recommend course of action  - Evaluate amount of meals eaten  - Assist patient with eating if necessary   - Allow adequate time for meals  - Recommend/ encourage appropriate diets, oral nutritional supplements, and vitamin/mineral supplements  - Order, calculate, and assess calorie counts as needed  - Recommend, monitor, and adjust tube feedings and TPN/PPN based on assessed needs  - Assess need for intravenous fluids  - Provide specific nutrition/hydration education as appropriate  - Include patient/family/caregiver in decisions related to nutrition  Outcome: Progressing     Problem: PAIN - ADULT  Goal: Verbalizes/displays adequate comfort level or baseline comfort level  Description: Interventions:  - Encourage patient to monitor pain and request assistance  - Assess pain using appropriate pain scale  - Administer analgesics based on type and severity of pain and evaluate response  - Implement non-pharmacological measures as appropriate and evaluate response  - Consider cultural and social influences on pain and pain management  - Notify physician/advanced practitioner if interventions unsuccessful or patient reports new pain  Outcome: Progressing     Problem: INFECTION - ADULT  Goal: Absence or prevention of progression during hospitalization  Description: INTERVENTIONS:  - Assess and monitor for signs and symptoms of infection  - Monitor lab/diagnostic results  - Monitor all insertion sites, i e  indwelling lines, tubes, and drains  - Monitor endotracheal if appropriate and nasal secretions for changes in amount and color  - [place] appropriate cooling/warming therapies per order  - Administer medications as ordered  - Instruct and encourage patient and family to use good hand hygiene technique  - Identify and instruct in appropriate isolation precautions for identified infection/condition  Outcome: Progressing  Goal: Absence of fever/infection during neutropenic period  Description: INTERVENTIONS:  - Monitor WBC    Outcome: Progressing     Problem: SAFETY ADULT  Goal: Maintain or return to baseline ADL function  Description: INTERVENTIONS:  -  Assess patient's ability to carry out ADLs; assess patient's baseline for ADL function and identify physical deficits which impact ability to perform ADLs (bathing, care of mouth/teeth, toileting, grooming, dressing, etc )  - Assess/evaluate cause of self-care deficits   - Assess range of motion  - Assess patient's mobility; develop plan if impaired  - Assess patient's need for assistive devices and provide as appropriate  - Encourage maximum independence but intervene and supervise when necessary  - Involve family in performance of ADLs  - Assess for home care needs following discharge   - Consider OT consult to assist with ADL evaluation and planning for discharge  - Provide patient education as appropriate  Outcome: Progressing  Goal: Maintains/Returns to pre admission functional level  Description: INTERVENTIONS:  - Perform BMAT or MOVE assessment daily    - Set and communicate daily mobility goal to care team and patient/family/caregiver  - Collaborate with rehabilitation services on mobility goals if consulted  - Perform Range of Motion  times a day  - Reposition patient every  hours    - Dangle patient  times a day  - Stand patient  times a day  - Ambulate patient  times a day  - Out of bed to chair  times a day   - Out of bed for meals  times a day  - Out of bed for toileting  - Record patient progress and toleration of activity level   Outcome: Progressing  Goal: Patient will remain free of falls  Description: INTERVENTIONS:  - Educate patient/family on patient safety including physical limitations  - Instruct patient to call for assistance with activity   - Consult OT/PT to assist with strengthening/mobility   - Keep Call bell within reach  - Keep bed low and locked with side rails adjusted as appropriate  - Keep care items and personal belongings within reach  - Initiate and maintain comfort rounds  - Make Fall Risk Sign visible to staff  - Offer Toileting every  Hours, in advance of need  - Initiate/Maintain alarm  - Obtain necessary fall risk management equipment:  - Apply yellow socks and bracelet for high fall risk patients  - Consider moving patient to room near nurses station  Outcome: Progressing     Problem: DISCHARGE PLANNING  Goal: Discharge to home or other facility with appropriate resources  Description: INTERVENTIONS:  - Identify barriers to discharge w/patient and caregiver  - Arrange for needed discharge resources and transportation as appropriate  - Identify discharge learning needs (meds, wound care, etc )  - Arrange for interpretive services to assist at discharge as needed  - Refer to Case Management Department for coordinating discharge planning if the patient needs post-hospital services based on physician/advanced practitioner order or complex needs related to functional status, cognitive ability, or social support system  Outcome: Progressing     Problem: Knowledge Deficit  Goal: Patient/family/caregiver demonstrates understanding of disease process, treatment plan, medications, and discharge instructions  Description: Complete learning assessment and assess knowledge base    Interventions:  - Provide teaching at level of understanding  - Provide teaching via preferred learning methods  Outcome: Progressing     Problem: SAFETY,RESTRAINT: NV/NON-SELF DESTRUCTIVE BEHAVIOR  Goal: Remains free of harm/injury (restraint for non violent/non self-detsructive behavior)  Description: INTERVENTIONS:  - Instruct patient/family regarding restraint use   - Assess and monitor physiologic and psychological status   - Provide interventions and comfort measures to meet assessed patient needs   - Identify and implement measures to help patient regain control  - Assess readiness for release of restraint   Outcome: Progressing  Goal: Returns to optimal restraint-free functioning  Description: INTERVENTIONS:  - Assess the patient's behavior and symptoms that indicate continued need for restraint  - Identify and implement measures to help patient regain control  - Assess readiness for release of restraint   Outcome: Progressing

## 2022-11-13 NOTE — QUICK NOTE
Chart reviewed  Last dose of lantus was on 11/09, patient currently on correctional insulin  Per primary team, patient is only tolerating clear liquids and barely any enteral intake, hence decision to hold lantus  Recommendation: Glucose trend above inpatient goals of 140 -180 mg/dl, and has been requiring more correctional insulin coverage  Please consider low dose lantus if glucose remains uncontrolled

## 2022-11-13 NOTE — PROGRESS NOTES
Daily Progress Note - Critical Care   Radha Marshall 61 y o  male MRN: [id number]  Unit/Bed#: MICU 02 Encounter: [id number]        ----------------------------------------------------------------------------------------  HPI/24hr events:  58-year-old male with past medical history of hypertension, diabetes, hyperlipidemia, and colon cancer with Mets to liver who now presents postop day 6 following ex lap, transverse colon resection, reversal of colostomy, segment 3 liver resection  Postoperative course has been complicated by recurrent respiratory failure and ESBL bacteremia    Was diuresing well yesterday afternoon status post total 30 mg of IV Lasix  Patient with respiratory distress and hypoxemia overnight requiring high-flow nasal cannula  Chest x-ray with right upper lobe collapse  CTA chest abdomen pelvis negative for PE, did have significant right upper lobe collapse and right pleural effusion  Ongoing hypoxemia this morning requiring 100% HFNC  Given initial 20 mg IV Lasix  Anemia hemoglobin 6 7 received 1 unit packed red blood cells this morning    ---------------------------------------------------------------------------------------  SUBJECTIVE    Review of Systems   Constitutional: Positive for fatigue  Respiratory: Positive for shortness of breath  Review of systems was reviewed and negative unless stated above in HPI/24-hour events   ---------------------------------------------------------------------------------------  Assessment and Plan:    Neuro:   • Analgesia:  Ketamine infusion  o P r n  Oxycodone  • Sedation:  Na  • Delirium PPX: CAM-ICU, Sleep Hygiene   • History of depression:  Home Cymbalta      CV:   • Hypertension:  Amlodipine 5 b i d   o Ongoing hypertension consider increasing to 10 b i d   o P r n   Labetalol  • Hyperlipidemia:  Statin      Pulm:  • Acute hypoxemic respiratory failure:  Secondary to atelectasis, compressive atelectasis right upper lobe infiltrate  o Aggressive chest physiotherapy  o NT suctioning  o Upright positioning  o May need to consider BiPAP  o Possible need for wake bronchoscopy for airway clearance  o Right-sided pleural effusion, small with may be contributing to overall compressive atelectasis, discuss IR thoracentesis today  o At very high risk of need for intubation      GI:   • Colon cancer with Mets to liver status post above procedure  o Clear liquid diet as respiratory status allows per surgery  o Has had return of bowel function  • GI prophylaxis:  Protonix  • Bowel regimen:  Na  • Last BM: 11/12      :   • ALEXY on CKD:  Now improved   o Creatinine 1 48  o Continue diuresis for goal-1-2 2 L      F/E/N:   • Electrolytes - Monitor/trend - replete based on deficiencies       Heme/Onc:   • Acute blood loss anemia:  Likely related to right upper quadrant hematoma postoperative losses  o Transfuse 1 unit packed red blood cells recheck this afternoon  • DVT PPX:  Subcutaneous heparin, SCDs      Endo:   • Type 2 diabetes:  Sliding scale insulin increased to algorithm 5  o Goal blood glucose 140-180  o Low threshold for insulin infusion      ID:   • ESBL bacteremia:  Repeat blood cultures now negative  o Continue ertapenem per ID recommendations through 11/15      MSK/Skin:   • PT/OT out of bed as tolerated      Disposition: Continue Critical Care   Code Status: Level 1 - Full Code  ---------------------------------------------------------------------------------------  ICU CORE MEASURES    Prophylaxis   VTE Pharmacologic Prophylaxis: Heparin  VTE Mechanical Prophylaxis: sequential compression device  Stress Ulcer Prophylaxis: Prophylaxis Not Indicated     Invasive Devices Review  Invasive Devices  Report    Central Venous Catheter Line  Duration           Port A Cath 03/10/22 Right Chest 247 days          Peripheral Intravenous Line  Duration           Peripheral IV 11/09/22 Left;Proximal;Ventral (anterior) Forearm 4 days    Peripheral IV 11/10/22 Dorsal (posterior); Left Wrist 3 days    Peripheral IV 11/10/22 Dorsal (posterior); Right Wrist 3 days          Arterial Line  Duration           Arterial Line 22 Radial 4 days          Drain  Duration           Urethral Catheter Latex 16 Fr  5 days              Can any invasive devices be discontinued today? No (provide explanation):  Required for treatment  ---------------------------------------------------------------------------------------  OBJECTIVE    Physical Exam  Constitutional:       Appearance: He is obese  HENT:      Head: Normocephalic  Mouth/Throat:      Mouth: Mucous membranes are moist    Eyes:      Pupils: Pupils are equal, round, and reactive to light  Cardiovascular:      Rate and Rhythm: Normal rate and regular rhythm  Pulmonary:      Comments: Tachypnea  Decreased rhonchorous breath sounds  Abdominal:      General: There is distension  Palpations: Abdomen is soft  Tenderness: There is abdominal tenderness  Musculoskeletal:      Cervical back: Normal range of motion  Right lower leg: Edema present  Left lower leg: Edema present  Skin:     General: Skin is warm  Capillary Refill: Capillary refill takes less than 2 seconds  Neurological:      General: No focal deficit present  Mental Status: He is alert           Vitals   Vitals:    22 0300 22 0400 22 0500 22 0558   BP:       BP Location:       Pulse: 86 94  100   Resp: 16 (!) 25  (!) 28   Temp:       TempSrc:       SpO2: 95% (!) 70% (!) 86% 92%   Weight:       Height:         Temp (24hrs), Av 3 °F (36 8 °C), Min:98 °F (36 7 °C), Max:98 5 °F (36 9 °C)  Current: Temperature: 98 4 °F (36 9 °C)      Invasive/non-invasive ventilation settings   Respiratory  Report   Lab Data (Last 4 hours)       0408            pH, Arterial       7 450             pCO2, Arterial       36 2             pO2, Arterial       57 6             HCO3, Arterial       24 6 Base Excess, Arterial       0 6                  O2/Vent Data           Most Recent        Non-Invasive Ventilation Mode   HFNC (High flow)                  Height and Weights   Height: 5' 8" (172 7 cm)  IBW (Ideal Body Weight): 68 4 kg  Body mass index is 40 46 kg/m²  Weight (last 2 days)     Date/Time Weight    11/11/22 0400 121 (266 1)            Intake and Output  I/O       11/11 0701  11/12 0700 11/12 0701 11/13 0700    P  O  720     I V  (mL/kg) 746 2 (6 2) 217 5 (1 8)    NG/GT 0     IV Piggyback 100 50    Total Intake(mL/kg) 1566 2 (12 9) 267 5 (2 2)    Urine (mL/kg/hr) 3570 (1 2) 1615 (0 6)    Emesis/NG output 0     Stool 0 0    Total Output 3570 1615    Net -2003 8 -1347 6          Unmeasured Stool Occurrence 0 x 1 x            Nutrition       Diet Orders   (From admission, onward)             Start     Ordered    11/12/22 1249  Diet Clear Liquid  Diet effective now        Comments: Surgical sips, no carbonation   References:    Nutrtion Support Algorithm Enteral vs  Parenteral   Question Answer Comment   Diet Type Clear Liquid    RD to adjust diet per protocol?  No        11/12/22 1251                  Laboratory and Diagnostics:  Results from last 7 days   Lab Units 11/13/22  0442 11/12/22  0604 11/11/22  0430 11/10/22  0450 11/10/22  0215 11/10/22  0000 11/09/22  2215 11/09/22  1135 11/08/22  2230 11/08/22  1913 11/08/22  0631   WBC Thousand/uL 15 65* 11 77* 10 24* 14 02*  --  9 18 1 40* 19 22*   < > 10 68* 21 96*   HEMOGLOBIN g/dL 6 7* 7 0* 7 4* 8 4* 8 5* 8 3* 9 7* 9 1*   < > 8 6* 10 1*   HEMATOCRIT % 20 3* 20 8* 21 6* 24 7* 25 0* 24 4* 29 6* 26 7*   < > 26 6* 29 7*   PLATELETS Thousands/uL 243 198 146* 139*  --  133* 199 146*   < > 225 327   NEUTROS PCT %  --   --  84*  --   --   --   --   --   --   --  88*   BANDS PCT %  --   --   --   --   --   --  6  --   --  4  --    MONOS PCT %  --   --  9  --   --   --   --   --   --   --  7   MONO PCT % 13*  --   --   --   --   --  0*  --   --  0*  --     < > = values in this interval not displayed       Results from last 7 days   Lab Units 11/13/22  0442 11/12/22  0604 11/11/22  0430 11/10/22  0450 11/09/22 2215 11/09/22 0423 11/08/22  1913   SODIUM mmol/L 142 143 142 140 138 137 135   POTASSIUM mmol/L 3 7 3 9 4 0 4 3 4 7 4 0 4 5   CHLORIDE mmol/L 112* 111* 112* 109* 109* 106 106   CO2 mmol/L 26 23 23 23 21 23 22   ANION GAP mmol/L 4 9 7 8 8 8 7   BUN mg/dL 45* 39* 40* 44* 38* 33* 31*   CREATININE mg/dL 1 48* 1 59* 1 70* 2 21* 2 33* 2 13* 2 28*   CALCIUM mg/dL 8 1* 8 2* 8 2* 8 1* 8 9 8 5 8 1*   GLUCOSE RANDOM mg/dL 197* 214* 156* 175* 167* 199* 217*   ALT U/L 61 62 86* 137*  --  222* 263*   AST U/L 69* 65* 68* 127*  --  308* 379*   ALK PHOS U/L 265* 304* 181* 247*  --  192* 179*   ALBUMIN g/dL 1 8* 1 8* 1 9* 1 7*  --  2 1* 2 0*   TOTAL BILIRUBIN mg/dL 1 08* 1 33* 1 01* 0 93  --  1 17* 0 69     Results from last 7 days   Lab Units 11/13/22  0442 11/12/22  0604 11/11/22  0430 11/10/22  0450 11/09/22 2215 11/09/22 0423 11/08/22  1913   MAGNESIUM mg/dL 2 5 2 6 2 7* 2 5 2 4 2 2 2 5   PHOSPHORUS mg/dL 4 2 4 5 4 5 5 0* 5 7* 3 4 3 7      Results from last 7 days   Lab Units 11/12/22  0604 11/09/22 2215   INR  1 10 1 24*   PTT seconds  --  37          Results from last 7 days   Lab Units 11/10/22  0450 11/10/22  0215 11/10/22  0000 11/09/22 2215 11/09/22  0737 11/09/22 0423 11/09/22  0207   LACTIC ACID mmol/L 2 0 2 4* 2 9* 5 6* 1 8 2 1* 2 6*     ABG:  Results from last 7 days   Lab Units 11/13/22  0408   PH ART  7 450   PCO2 ART mm Hg 36 2   PO2 ART mm Hg 57 6*   HCO3 ART mmol/L 24 6   BASE EXC ART mmol/L 0 6   ABG SOURCE  Line, Arterial     VBG:  Results from last 7 days   Lab Units 11/13/22  0408 11/08/22  2330 11/08/22  2247   PH RUMA   --   --  7 429*   PCO2 RUMA mm Hg  --   --  33 8*   PO2 RUMA mm Hg  --   --  49 3*   HCO3 RUMA mmol/L  --   --  21 9*   BASE EXC RUMA mmol/L  --   --  -2 1   ABG SOURCE  Line, Arterial   < >  --     < > = values in this interval not displayed  Micro  Results from last 7 days   Lab Units 11/10/22  0450 11/08/22  2230   BLOOD CULTURE  No Growth at 48 hrs  No Growth at 48 hrs   Escherichia coli ESBL*  Escherichia coli ESBL*   GRAM STAIN RESULT   --  Gram negative rods*  Gram negative rods*         Active Medications  Scheduled Meds:  Current Facility-Administered Medications   Medication Dose Route Frequency Provider Last Rate   • acetaminophen  975 mg Oral Q8H [address] Annabelle Huerta PA-C     • amLODIPine  5 mg Oral BID Chiki Mejia MD     • atorvastatin  40 mg Oral Daily With Nemesio Steele PA-C     • DULoxetine  60 mg Oral Daily Harmony Lopez PA-C     • ertapenem  1,000 mg Intravenous Q24H GAURANG Blanco Stopped (11/12/22 1716)   • glycopyrrolate  0 2 mg Intravenous Q4H PRN Wander Arriaga MD     • haloperidol lactate  2 mg Intramuscular Q30 Min PRN Wander Arriaga MD     • heparin (porcine)  5,000 Units Subcutaneous [facility] Nelida Preston DO     • HYDROmorphone  0 5 mg Intravenous Q4H PRN Harmony Lopez PA-C     • insulin lispro  2-12 Units Subcutaneous 4x Daily (AC & HS) Talha Huerta PA-C     • ketamine  0 1 mg/kg/hr (Ideal) Intravenous Continuous Wander Arriaga MD 0 1 mg/kg/hr (11/12/22 1906)   • labetalol  10 mg Intravenous Q4H PRN Sahra Todd DO     • LORazepam  1 mg Intravenous Q1H PRN Wander Arriaga MD     • methocarbamol  500 mg Oral BID Talha Huerta PA-C     • ondansetron  4 mg Intravenous Q4H PRN Jules Cook MD     • oxyCODONE  5 mg Oral Q4H PRN Harmony Lopez PA-C      Or   • oxyCODONE  10 mg Oral Q4H PRN Harmony Lopez PA-C     • pantoprazole  40 mg Oral Early Morning Annabelle Huerta PA-C     • phenol  1 spray Mouth/Throat Q2H PRN Naina Young MD       Continuous Infusions:  ketamine, 0 1 mg/kg/hr (Ideal), Last Rate: 0 1 mg/kg/hr (11/12/22 1906)      PRN Meds:   glycopyrrolate, 0 2 mg, Q4H PRN  haloperidol lactate, 2 mg, Q30 Min PRN  HYDROmorphone, 0 5 mg, Q4H PRN  labetalol, 10 mg, Q4H PRN  LORazepam, 1 mg, Q1H PRN  ondansetron, 4 mg, Q4H PRN  oxyCODONE, 5 mg, Q4H PRN   Or  oxyCODONE, 10 mg, Q4H PRN  phenol, 1 spray, Q2H PRN        Allergies   Allergies   Allergen Reactions   • Shellfish-Derived Products - Food Allergy Anaphylaxis   • Erythromycin GI Intolerance       Advance Directive and Living Will:      Power of :    POLST:        Counseling / Coordination of Care  Total Critical Care time spent 30 minutes excluding procedures, teaching and family updates  Azeb Shields PA-C        Portions of the record may have been created with voice recognition software  Occasional wrong word or "sound a like" substitutions may have occurred due to the inherent limitations of voice recognition software    Read the chart carefully and recognize, using context, where substitutions have occurred

## 2022-11-13 NOTE — RESPIRATORY THERAPY NOTE
11/13/22 0744   Respiratory Assessment   Assessment Type Assess only   General Appearance Awake   Respiratory Pattern Normal   Chest Assessment Chest expansion symmetrical   Bilateral Breath Sounds Clear;Diminished   Resp Comments Received on HFNC 100%/55: where he remains  Sats 93%   Bilateral BS clear/diminshed   O2 Device HFNC   Non-Invasive Information   O2 Interface Device HFNC prongs   Non-Invasive Ventilation Mode HFNC (High flow)   SpO2 93 %   $ Pulse Oximetry Spot Check Charge Completed   Non-Invasive Settings   FiO2 (%) 100   Flow (lpm) 55   Temperature (Set) 31   Non-Invasive Readings   Skin Intervention Skin intact   Heater Temperature (Obs) 31   Maintenance   Water bag changed No

## 2022-11-13 NOTE — PLAN OF CARE
Problem: MOBILITY - ADULT  Goal: Maintain or return to baseline ADL function  Description: INTERVENTIONS:  -  Assess patient's ability to carry out ADLs; assess patient's baseline for ADL function and identify physical deficits which impact ability to perform ADLs (bathing, care of mouth/teeth, toileting, grooming, dressing, etc )  - Assess/evaluate cause of self-care deficits   - Assess range of motion  - Assess patient's mobility; develop plan if impaired  - Assess patient's need for assistive devices and provide as appropriate  - Encourage maximum independence but intervene and supervise when necessary  - Involve family in performance of ADLs  - Assess for home care needs following discharge   - Consider OT consult to assist with ADL evaluation and planning for discharge  - Provide patient education as appropriate  Outcome: Progressing  Goal: Maintains/Returns to pre admission functional level  Description: INTERVENTIONS:  - Perform BMAT or MOVE assessment daily    - Set and communicate daily mobility goal to care team and patient/family/caregiver  - Collaborate with rehabilitation services on mobility goals if consulted  - Perform Range of Motion 4 times a day  - Reposition patient every 2 hours    - Dangle patient 1 times a day  - Stand patient 1 times a day  - Ambulate patient 1 times a day  - Out of bed to chair 1 times a day   - Out of bed for meals 1 times a day  - Out of bed for toileting  - Record patient progress and toleration of activity level   Outcome: Progressing     Problem: Prexisting or High Potential for Compromised Skin Integrity  Goal: Skin integrity is maintained or improved  Description: INTERVENTIONS:  - Identify patients at risk for skin breakdown  - Assess and monitor skin integrity  - Assess and monitor nutrition and hydration status  - Monitor labs   - Assess for incontinence   - Turn and reposition patient  - Assist with mobility/ambulation  - Relieve pressure over bony prominences  - Avoid friction and shearing  - Provide appropriate hygiene as needed including keeping skin clean and dry  - Evaluate need for skin moisturizer/barrier cream  - Collaborate with interdisciplinary team   - Patient/family teaching  - Consider wound care consult   Outcome: Progressing     Problem: Potential for Falls  Goal: Patient will remain free of falls  Description: INTERVENTIONS:  - Educate patient/family on patient safety including physical limitations  - Instruct patient to call for assistance with activity   - Consult OT/PT to assist with strengthening/mobility   - Keep Call bell within reach  - Keep bed low and locked with side rails adjusted as appropriate  - Keep care items and personal belongings within reach  - Initiate and maintain comfort rounds  - Make Fall Risk Sign visible to staff  - Offer Toileting every 2 Hours, in advance of need  - Initiate/Maintain -alarm  - Obtain necessary fall risk management equipment: -  - Apply yellow socks and bracelet for high fall risk patients  - Consider moving patient to room near nurses station  Outcome: Progressing     Problem: Nutrition/Hydration-ADULT  Goal: Nutrient/Hydration intake appropriate for improving, restoring or maintaining nutritional needs  Description: Monitor and assess patient's nutrition/hydration status for malnutrition  Collaborate with interdisciplinary team and initiate plan and interventions as ordered  Monitor patient's weight and dietary intake as ordered or per policy  Utilize nutrition screening tool and intervene as necessary  Determine patient's food preferences and provide high-protein, high-caloric foods as appropriate       INTERVENTIONS:  - Monitor oral intake, urinary output, labs, and treatment plans  - Assess nutrition and hydration status and recommend course of action  - Evaluate amount of meals eaten  - Assist patient with eating if necessary   - Allow adequate time for meals  - Recommend/ encourage appropriate diets, oral nutritional supplements, and vitamin/mineral supplements  - Order, calculate, and assess calorie counts as needed  - Recommend, monitor, and adjust tube feedings and TPN/PPN based on assessed needs  - Assess need for intravenous fluids  - Provide specific nutrition/hydration education as appropriate  - Include patient/family/caregiver in decisions related to nutrition  Outcome: Progressing     Problem: PAIN - ADULT  Goal: Verbalizes/displays adequate comfort level or baseline comfort level  Description: Interventions:  - Encourage patient to monitor pain and request assistance  - Assess pain using appropriate pain scale  - Administer analgesics based on type and severity of pain and evaluate response  - Implement non-pharmacological measures as appropriate and evaluate response  - Consider cultural and social influences on pain and pain management  - Notify physician/advanced practitioner if interventions unsuccessful or patient reports new pain  Outcome: Progressing     Problem: INFECTION - ADULT  Goal: Absence or prevention of progression during hospitalization  Description: INTERVENTIONS:  - Assess and monitor for signs and symptoms of infection  - Monitor lab/diagnostic results  - Monitor all insertion sites, i e  indwelling lines, tubes, and drains  - Monitor endotracheal if appropriate and nasal secretions for changes in amount and color  - [place] appropriate cooling/warming therapies per order  - Administer medications as ordered  - Instruct and encourage patient and family to use good hand hygiene technique  - Identify and instruct in appropriate isolation precautions for identified infection/condition  Outcome: Progressing  Goal: Absence of fever/infection during neutropenic period  Description: INTERVENTIONS:  - Monitor WBC    Outcome: Progressing     Problem: SAFETY ADULT  Goal: Maintain or return to baseline ADL function  Description: INTERVENTIONS:  -  Assess patient's ability to carry out ADLs; assess patient's baseline for ADL function and identify physical deficits which impact ability to perform ADLs (bathing, care of mouth/teeth, toileting, grooming, dressing, etc )  - Assess/evaluate cause of self-care deficits   - Assess range of motion  - Assess patient's mobility; develop plan if impaired  - Assess patient's need for assistive devices and provide as appropriate  - Encourage maximum independence but intervene and supervise when necessary  - Involve family in performance of ADLs  - Assess for home care needs following discharge   - Consider OT consult to assist with ADL evaluation and planning for discharge  - Provide patient education as appropriate  Outcome: Progressing  Goal: Maintains/Returns to pre admission functional level  Description: INTERVENTIONS:  - Perform BMAT or MOVE assessment daily    - Set and communicate daily mobility goal to care team and patient/family/caregiver  - Collaborate with rehabilitation services on mobility goals if consulted  - Perform Range of Motion - times a day  - Reposition patient every - hours    - Dangle patient - times a day  - Stand patient - times a day  - Ambulate patient - times a day  - Out of bed to chair - times a day   - Out of bed for meals - times a day  - Out of bed for toileting  - Record patient progress and toleration of activity level   Outcome: Progressing  Goal: Patient will remain free of falls  Description: INTERVENTIONS:  - Educate patient/family on patient safety including physical limitations  - Instruct patient to call for assistance with activity   - Consult OT/PT to assist with strengthening/mobility   - Keep Call bell within reach  - Keep bed low and locked with side rails adjusted as appropriate  - Keep care items and personal belongings within reach  - Initiate and maintain comfort rounds  - Make Fall Risk Sign visible to staff  - Offer Toileting every - Hours, in advance of need  - Initiate/Maintain -alarm  - Obtain necessary fall risk management equipment: ----  - Apply yellow socks and bracelet for high fall risk patients  - Consider moving patient to room near nurses station  Outcome: Progressing     Problem: DISCHARGE PLANNING  Goal: Discharge to home or other facility with appropriate resources  Description: INTERVENTIONS:  - Identify barriers to discharge w/patient and caregiver  - Arrange for needed discharge resources and transportation as appropriate  - Identify discharge learning needs (meds, wound care, etc )  - Arrange for interpretive services to assist at discharge as needed  - Refer to Case Management Department for coordinating discharge planning if the patient needs post-hospital services based on physician/advanced practitioner order or complex needs related to functional status, cognitive ability, or social support system  Outcome: Progressing     Problem: Knowledge Deficit  Goal: Patient/family/caregiver demonstrates understanding of disease process, treatment plan, medications, and discharge instructions  Description: Complete learning assessment and assess knowledge base    Interventions:  - Provide teaching at level of understanding  - Provide teaching via preferred learning methods  Outcome: Progressing

## 2022-11-13 NOTE — PROGRESS NOTES
Progress Note - Acute Pain Service    Eloise Mane 61 y o  male MRN: [id number]  Unit/Bed#: MICU 02 Encounter: [id number]      Assessment:   Principal Problem:    Colon cancer metastasized to liver [facility])  Active Problems:    Benign essential hypertension    Type 2 [facility] mellitus with [facility]    Malignant neoplasm of transverse colon (HCC)    Encephalopathy    Flash pulmonary edema (HCC)    MR (mitral regurgitation)    Bacteremia    ESBL (extended spectrum beta-lactamase) producing bacteria infection    Acute respiratory failure with hypoxia ([facility] )    Eloise Mane is a 61 y o  male with PMHx of metastatic colon cancer s/p exploratory laparotomy with transverse colon resection, reversal of loop colostomy, peritoneal biopsy, segment 3 liver resection on 11/07/2022   Patient received a preoperative epidural at that time, which was removed yesterday  He is on a low dose ketamine infusion and is being followed by palliative care as well  He states he still has good pain control after the epidural was discontinued yesterday and has not requested any of his PRN oxycodone       Plan:   - Discontinue ketamine infusion, unlikely that he will have any discernable change in pain control without it  - Continue remainder of MMA regimen as written, encourage use of PRN medications if pain increases  - Palliative care to assume control of pain regimen since epidural and ketamine infusions are now discontinued    APS will sign off, please re-consult for additional help and guidance     Pain History  Current pain location(s): abdomen  Pain Scale:   3/10  Quality: Sore  24 hour history: See above    Opioid requirement previous 24 hours: None    Meds/Allergies     Allergies   Allergen Reactions   • Shellfish-Derived Products - Food Allergy Anaphylaxis   • Erythromycin GI Intolerance       Objective     Temp:  [98 °F (36 7 °C)-98 4 °F (36 9 °C)] 98 °F (36 7 °C)  HR:  [] 92  Resp:  [16-30] 20  BP: (128-170)/(70-98) 170/70  Arterial Line BP: (126-176)/(60-84) 126/72  FiO2 (%):  [100] 100    Physical Exam  Vitals and nursing note reviewed  Constitutional:       Appearance: Normal appearance  HENT:      Head: Normocephalic and atraumatic  Right Ear: External ear normal       Left Ear: External ear normal       Nose:      Comments: Patient on HFNC     Mouth/Throat:      Mouth: Mucous membranes are moist       Pharynx: Oropharynx is clear  Eyes:      Conjunctiva/sclera: Conjunctivae normal    Cardiovascular:      Rate and Rhythm: Normal rate and regular rhythm  Pulses: Normal pulses  Heart sounds: Normal heart sounds  Pulmonary:      Breath sounds: Rhonchi present  Abdominal:      Palpations: Abdomen is soft  Tenderness: There is abdominal tenderness  Musculoskeletal:         General: Normal range of motion  Cervical back: Normal range of motion  Skin:     General: Skin is warm and dry  Neurological:      General: No focal deficit present  Mental Status: He is alert and oriented to person, place, and time  Mental status is at baseline  Psychiatric:         Mood and Affect: Mood normal          Lab Results:   Results from last 7 days   Lab Units 11/13/22  0442   WBC Thousand/uL 15 65*   HEMOGLOBIN g/dL 6 7*   HEMATOCRIT % 20 3*   PLATELETS Thousands/uL 243      Results from last 7 days   Lab Units 11/13/22  0442 11/08/22  0631 11/07/22  0835   POTASSIUM mmol/L 3 7   < >  --    CHLORIDE mmol/L 112*   < >  --    CO2 mmol/L 26   < >  --    CO2, I-STAT mmol/L  --   --  26   BUN mg/dL 45*   < >  --    CREATININE mg/dL 1 48*   < >  --    CALCIUM mg/dL 8 1*   < >  --    ALK PHOS U/L 265*   < >  --    ALT U/L 61   < >  --    AST U/L 69*   < >  --    GLUCOSE, ISTAT mg/dl  --   --  169*    < > = values in this interval not displayed  Counseling / Coordination of Care  Total floor / unit time spent today 15 minutes   Greater than 50% of total time was spent with the patient and / or family counseling and / or coordination of care  Please note that the APS provides consultative services regarding pain management only  With the exception of ketamine and epidural infusions and except when indicated, final decisions regarding starting or changing doses of analgesic medications are at the discretion of the consulting service  Off hours consultation and/or medication management is generally not available      Meg Mehta MD  Acute Pain Service

## 2022-11-14 ENCOUNTER — APPOINTMENT (OUTPATIENT)
Dept: RADIOLOGY | Facility: HOSPITAL | Age: 57
End: 2022-11-14

## 2022-11-14 LAB
ABO GROUP BLD BPU: NORMAL
ALBUMIN SERPL BCP-MCNC: 1.7 G/DL (ref 3.5–5)
ALP SERPL-CCNC: 228 U/L (ref 46–116)
ALT SERPL W P-5'-P-CCNC: 46 U/L (ref 12–78)
ANION GAP SERPL CALCULATED.3IONS-SCNC: 7 MMOL/L (ref 4–13)
AST SERPL W P-5'-P-CCNC: 49 U/L (ref 5–45)
BASE EXCESS BLDA CALC-SCNC: 0.8 MMOL/L
BASE EXCESS BLDA CALC-SCNC: 2 MMOL/L
BILIRUB DIRECT SERPL-MCNC: 0.68 MG/DL (ref 0–0.2)
BILIRUB SERPL-MCNC: 1.18 MG/DL (ref 0.2–1)
BPU ID: NORMAL
BUN SERPL-MCNC: 35 MG/DL (ref 5–25)
CA-I BLD-SCNC: 1.09 MMOL/L (ref 1.12–1.32)
CALCIUM SERPL-MCNC: 8.2 MG/DL (ref 8.3–10.1)
CHLORIDE SERPL-SCNC: 111 MMOL/L (ref 96–108)
CO2 SERPL-SCNC: 25 MMOL/L (ref 21–32)
CREAT SERPL-MCNC: 1.26 MG/DL (ref 0.6–1.3)
CROSSMATCH: NORMAL
ERYTHROCYTE [DISTWIDTH] IN BLOOD BY AUTOMATED COUNT: 14.6 % (ref 11.6–15.1)
GFR SERPL CREATININE-BSD FRML MDRD: 62 ML/MIN/1.73SQ M
GLUCOSE SERPL-MCNC: 192 MG/DL (ref 65–140)
GLUCOSE SERPL-MCNC: 196 MG/DL (ref 65–140)
GLUCOSE SERPL-MCNC: 205 MG/DL (ref 65–140)
GLUCOSE SERPL-MCNC: 239 MG/DL (ref 65–140)
GLUCOSE SERPL-MCNC: 288 MG/DL (ref 65–140)
GLUCOSE SERPL-MCNC: 294 MG/DL (ref 65–140)
HCO3 BLDA-SCNC: 24 MMOL/L (ref 22–28)
HCO3 BLDA-SCNC: 26.8 MMOL/L (ref 22–28)
HCT VFR BLD AUTO: 23.4 % (ref 36.5–49.3)
HFNC FLOW LPM: 55
HFNC FLOW LPM: 55
HGB BLD-MCNC: 7.6 G/DL (ref 12–17)
MAGNESIUM SERPL-MCNC: 2.4 MG/DL (ref 1.6–2.6)
MCH RBC QN AUTO: 29.8 PG (ref 26.8–34.3)
MCHC RBC AUTO-ENTMCNC: 32.5 G/DL (ref 31.4–37.4)
MCV RBC AUTO: 92 FL (ref 82–98)
NON VENT HFNC FIO2: 65
NON VENT HFNC FIO2: 85
NON VENT TYPE HFNC: ABNORMAL
NON VENT TYPE HFNC: ABNORMAL
O2 CT BLDA-SCNC: 10.1 ML/DL (ref 16–23)
O2 CT BLDA-SCNC: 11.1 ML/DL (ref 16–23)
OXYHGB MFR BLDA: 93.2 % (ref 94–97)
OXYHGB MFR BLDA: 96.3 % (ref 94–97)
PCO2 BLDA: 31.9 MM HG (ref 36–44)
PCO2 BLDA: 42.6 MM HG (ref 36–44)
PH BLDA: 7.42 [PH] (ref 7.35–7.45)
PH BLDA: 7.49 [PH] (ref 7.35–7.45)
PHOSPHATE SERPL-MCNC: 3.9 MG/DL (ref 2.7–4.5)
PLATELET # BLD AUTO: 249 THOUSANDS/UL (ref 149–390)
PMV BLD AUTO: 10.5 FL (ref 8.9–12.7)
PO2 BLDA: 114.3 MM HG (ref 75–129)
PO2 BLDA: 79.1 MM HG (ref 75–129)
POTASSIUM SERPL-SCNC: 3.6 MMOL/L (ref 3.5–5.3)
PROT SERPL-MCNC: 5.5 G/DL (ref 6.4–8.4)
RBC # BLD AUTO: 2.55 MILLION/UL (ref 3.88–5.62)
SODIUM SERPL-SCNC: 143 MMOL/L (ref 135–147)
SPECIMEN SOURCE: ABNORMAL
SPECIMEN SOURCE: ABNORMAL
UNIT DISPENSE STATUS: NORMAL
UNIT PRODUCT CODE: NORMAL
UNIT PRODUCT VOLUME: 350 ML
UNIT RH: NORMAL
WBC # BLD AUTO: 15.5 THOUSAND/UL (ref 4.31–10.16)

## 2022-11-14 RX ORDER — INSULIN LISPRO 100 [IU]/ML
1-6 INJECTION, SOLUTION INTRAVENOUS; SUBCUTANEOUS
Status: DISCONTINUED | OUTPATIENT
Start: 2022-11-14 | End: 2022-11-15

## 2022-11-14 RX ORDER — INSULIN GLARGINE 100 [IU]/ML
5 INJECTION, SOLUTION SUBCUTANEOUS EVERY MORNING
Status: DISCONTINUED | OUTPATIENT
Start: 2022-11-14 | End: 2022-11-14

## 2022-11-14 RX ORDER — POTASSIUM CHLORIDE 20 MEQ/1
20 TABLET, EXTENDED RELEASE ORAL ONCE
Status: COMPLETED | OUTPATIENT
Start: 2022-11-14 | End: 2022-11-14

## 2022-11-14 RX ORDER — POTASSIUM CHLORIDE 14.9 MG/ML
20 INJECTION INTRAVENOUS ONCE
Status: COMPLETED | OUTPATIENT
Start: 2022-11-14 | End: 2022-11-14

## 2022-11-14 RX ORDER — INSULIN GLARGINE 100 [IU]/ML
10 INJECTION, SOLUTION SUBCUTANEOUS
Status: DISCONTINUED | OUTPATIENT
Start: 2022-11-14 | End: 2022-11-15

## 2022-11-14 RX ADMIN — AMLODIPINE BESYLATE 10 MG: 10 TABLET ORAL at 17:56

## 2022-11-14 RX ADMIN — SODIUM CHLORIDE SOLN NEBU 3% 4 ML: 3 NEBU SOLN at 01:15

## 2022-11-14 RX ADMIN — INSULIN LISPRO 2 UNITS: 100 INJECTION, SOLUTION INTRAVENOUS; SUBCUTANEOUS at 00:05

## 2022-11-14 RX ADMIN — POTASSIUM CHLORIDE 20 MEQ: 1500 TABLET, EXTENDED RELEASE ORAL at 08:17

## 2022-11-14 RX ADMIN — ACETAMINOPHEN 975 MG: 325 TABLET, FILM COATED ORAL at 22:10

## 2022-11-14 RX ADMIN — ACETAMINOPHEN 975 MG: 325 TABLET, FILM COATED ORAL at 05:40

## 2022-11-14 RX ADMIN — LEVALBUTEROL HYDROCHLORIDE 1.25 MG: 1.25 SOLUTION, CONCENTRATE RESPIRATORY (INHALATION) at 01:15

## 2022-11-14 RX ADMIN — METHOCARBAMOL 500 MG: 500 TABLET ORAL at 08:17

## 2022-11-14 RX ADMIN — INSULIN LISPRO 4 UNITS: 100 INJECTION, SOLUTION INTRAVENOUS; SUBCUTANEOUS at 17:56

## 2022-11-14 RX ADMIN — HEPARIN SODIUM 5000 UNITS: 5000 INJECTION INTRAVENOUS; SUBCUTANEOUS at 13:24

## 2022-11-14 RX ADMIN — LEVALBUTEROL HYDROCHLORIDE 1.25 MG: 1.25 SOLUTION, CONCENTRATE RESPIRATORY (INHALATION) at 08:02

## 2022-11-14 RX ADMIN — PANTOPRAZOLE SODIUM 40 MG: 40 TABLET, DELAYED RELEASE ORAL at 05:41

## 2022-11-14 RX ADMIN — INSULIN LISPRO 3 UNITS: 100 INJECTION, SOLUTION INTRAVENOUS; SUBCUTANEOUS at 22:09

## 2022-11-14 RX ADMIN — METHOCARBAMOL 500 MG: 500 TABLET ORAL at 17:56

## 2022-11-14 RX ADMIN — HEPARIN SODIUM 5000 UNITS: 5000 INJECTION INTRAVENOUS; SUBCUTANEOUS at 22:10

## 2022-11-14 RX ADMIN — INSULIN GLARGINE 5 UNITS: 100 INJECTION, SOLUTION SUBCUTANEOUS at 13:24

## 2022-11-14 RX ADMIN — OXYCODONE HYDROCHLORIDE 10 MG: 10 TABLET ORAL at 18:07

## 2022-11-14 RX ADMIN — ACETAMINOPHEN 975 MG: 325 TABLET, FILM COATED ORAL at 13:24

## 2022-11-14 RX ADMIN — HEPARIN SODIUM 5000 UNITS: 5000 INJECTION INTRAVENOUS; SUBCUTANEOUS at 05:40

## 2022-11-14 RX ADMIN — ATORVASTATIN CALCIUM 40 MG: 40 TABLET, FILM COATED ORAL at 17:56

## 2022-11-14 RX ADMIN — INSULIN GLARGINE 10 UNITS: 100 INJECTION, SOLUTION SUBCUTANEOUS at 22:10

## 2022-11-14 RX ADMIN — AMLODIPINE BESYLATE 10 MG: 10 TABLET ORAL at 08:17

## 2022-11-14 RX ADMIN — DULOXETINE HYDROCHLORIDE 60 MG: 60 CAPSULE, DELAYED RELEASE ORAL at 08:17

## 2022-11-14 RX ADMIN — OXYCODONE HYDROCHLORIDE 10 MG: 10 TABLET ORAL at 05:41

## 2022-11-14 RX ADMIN — OXYCODONE HYDROCHLORIDE 10 MG: 10 TABLET ORAL at 11:54

## 2022-11-14 RX ADMIN — SODIUM CHLORIDE SOLN NEBU 3% 4 ML: 3 NEBU SOLN at 08:02

## 2022-11-14 RX ADMIN — INSULIN LISPRO 2 UNITS: 100 INJECTION, SOLUTION INTRAVENOUS; SUBCUTANEOUS at 05:46

## 2022-11-14 RX ADMIN — ERTAPENEM SODIUM 1000 MG: 1 INJECTION, POWDER, LYOPHILIZED, FOR SOLUTION INTRAMUSCULAR; INTRAVENOUS at 17:56

## 2022-11-14 RX ADMIN — POTASSIUM CHLORIDE 20 MEQ: 14.9 INJECTION, SOLUTION INTRAVENOUS at 08:16

## 2022-11-14 NOTE — PROGRESS NOTES
Progress Note - Surgical Oncology  Demetrio Zamora 61 y o  male MRN: [id number]  Unit/Bed#: [facility] 02 Encounter: [id number]      Assessment:  62yo M POD3 s/p exploratory laparotomy, transverse colon resection, reversal of loop colostomy, peritoneal biopsy, and segment 3 hepatic resection w/ a post-operative course complicated by acute blood loss anemia due to LUQ hematoma and recurrent acute encephalopathy and hypoxic respiratory failure requiring endotracheal intubation, now extubated  Afebrile, VSS on high-flow nasal cannula 50 and 60%  Blood gas:  7 4/32/79/24/0 8  Creatinine 1 48 yesterday from 1 59  White count 16 15 from 15 6 yesterday    hemoglobin 7 6 from 8 from 6 7   UOP; 1 9  L             Plan:  Advance diet as tolerated  Continue to wean O2 as tolerated  Continue to monitor h/h after 1 unit yesterday  Hold off on draining collection for now but continue to monitor hemodynamics  Aggressive pulm toilet  DVT px  Continue abx per ID  Appreciate ICU level of care  Pain control per palliative; Subjective/Objective     Subjective:   CT scan yesterday showed: CT PE abd/pelvis: Atelectasis RUL 2/2 mucus plugging  Small L pleural effusion and mucus plugging  Increased size of hematoma within perisplenic region  Similar appearance of colonic anastomosis except for moderate dilation of ascending colon and hepatic flexure proximal to anastomosis  Possible partial obstruction or ileus  Objective:     Blood pressure 170/70, pulse 96, temperature 98 °F (36 7 °C), temperature source Oral, resp  rate 18, height 5' 8" (1 727 m), weight 121 kg (266 lb 1 5 oz), SpO2 95 %  ,Body mass index is 40 46 kg/m²  Physical Exam  Vitals reviewed  Constitutional:       General: He is not in acute distress  Appearance: He is not ill-appearing, toxic-appearing or diaphoretic  HENT:      Head: Normocephalic and atraumatic  Eyes:      Extraocular Movements: Extraocular movements intact     Cardiovascular:      Rate and Rhythm: Normal rate  Pulmonary:      Effort: Pulmonary effort is normal  No respiratory distress  Abdominal:      General: There is no distension  Palpations: Abdomen is soft  Tenderness: There is no abdominal tenderness  There is no guarding or rebound  Comments: Incision clean, dry and intact   Skin:     General: Skin is warm and dry  Neurological:      Mental Status: He is alert and oriented to person, place, and time  Mental status is at baseline  Psychiatric:         Mood and Affect: Mood normal              Intake/Output Summary (Last 24 hours) at 11/14/2022 0622  Last data filed at 11/14/2022 0501  Gross per 24 hour   Intake 980 4 ml   Output 1935 ml   Net -954 6 ml       Invasive Devices  Report    Central Venous Catheter Line  Duration           Port A Cath 03/10/22 Right Chest 248 days          Peripheral Intravenous Line  Duration           Peripheral IV 11/10/22 Dorsal (posterior); Right Wrist 4 days    Peripheral IV 11/13/22 Right;Upper;Ventral (anterior) Arm <1 day          Arterial Line  Duration           Arterial Line 11/08/22 Radial 5 days          Drain  Duration           Urethral Catheter Latex 16 Fr  6 days                I have personally reviewed pertinent lab results    , CBC:   Lab Results   Component Value Date    WBC 15 50 (H) 11/14/2022    HGB 7 6 (L) 11/14/2022    HCT 23 4 (L) 11/14/2022    MCV 92 11/14/2022     11/14/2022    MCH 29 8 11/14/2022    MCHC 32 5 11/14/2022    RDW 14 6 11/14/2022    MPV 10 5 11/14/2022    NRBC 0 11/13/2022   , CMP:   No results found for: SODIUM, K, CL, CO2, ANIONGAP, BUN, CREATININE, GLUCOSE, CALCIUM, AST, ALT, ALKPHOS, PROT, BILITOT, EGFR, Coagulation:   No results found for: PT, INR, APTT

## 2022-11-14 NOTE — PROGRESS NOTES
Progress note - Palliative and Supportive Care   Merritt Whiting 61 y o  male [id number]    Patient Active Problem List   Diagnosis   • Type 2 diabetes mellitus without complication, with long-term current use of insulin (HCC)   • Benign essential hypertension   • Mixed hyperlipidemia   • Erectile dysfunction   • Low testosterone   • Obesity (BMI 30-39  9)   • Testicular hypogonadism   • Incarcerated umbilical hernia   • Left ureteral calculus   • Type 2 diabetes mellitus with hyperlipidemia (HCC)   • Colonic mass   • Microcytic anemia   • Hypokalemia   • Transaminitis   • Thrombocytosis   • Iron deficiency anemia, unspecified   • Malignant neoplasm of transverse colon (HCC)   • 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)   • Colon cancer metastasized to liver [facility])   • Colostomy prolapse (HCC)   • Other fatigue   • Cervical radiculopathy   • Encephalopathy   • Flash pulmonary edema (HCC)   • MR (mitral regurgitation)   • Bacteremia   • ESBL (extended spectrum beta-lactamase) producing bacteria infection   • Acute respiratory failure with hypoxia (HCC)     Active issues specifically addressed today include:   Metastatic colon cancer postop colon resection and liver resection  Cancer associated pain  Palliative care patient    Plan:  1  Symptom management - PSC to assume pain mgmt going forward:   - Continue oxycodone 5mg/10mg PRN for pain with 0 5mg HM breakthrough   - tylenol 975mg q8h ATC   - Robaxin 500mg BID   - cymbalta 60mg daily   - Will d/c haldol/ativan PRNs as no longer on ketamine gtt      Interval history:       Patient seen today, sleeping but easily awakens  Endorses pain but controlled with current regimen (used 3x oxycodone 10mg and 2x oxycodone 5mg in past 24 hours)  No other symptoms reported       MEDICATIONS / ALLERGIES:     all current active meds have been reviewed and current meds:   Current Facility-Administered Medications   Medication Dose Route Frequency   • acetaminophen (TYLENOL) tablet 975 mg  975 mg Oral Q8H [address]   • amLODIPine (NORVASC) tablet 10 mg  10 mg Oral BID   • atorvastatin (LIPITOR) tablet 40 mg  40 mg Oral Daily With Dinner   • DULoxetine (CYMBALTA) delayed release capsule 60 mg  60 mg Oral Daily   • ertapenem (INVanz) 1,000 mg in sodium chloride 0 9 % 50 mL IVPB  1,000 mg Intravenous Q24H   • glycopyrrolate (ROBINUL) injection 0 2 mg  0 2 mg Intravenous Q4H PRN   • heparin (porcine) subcutaneous injection 5,000 Units  5,000 Units Subcutaneous Q8H [address]   • HYDROmorphone (DILAUDID) injection 0 5 mg  0 5 mg Intravenous Q4H PRN   • insulin lispro (HumaLOG) 100 units/mL subcutaneous injection 1-6 Units  1-6 Units Subcutaneous Q6H [address]   • labetalol (NORMODYNE) injection 10 mg  10 mg Intravenous Q4H PRN   • methocarbamol (ROBAXIN) tablet 500 mg  500 mg Oral BID   • ondansetron (ZOFRAN) injection 4 mg  4 mg Intravenous Q4H PRN   • oxyCODONE (ROXICODONE) IR tablet 5 mg  5 mg Oral Q4H PRN    Or   • oxyCODONE (ROXICODONE) immediate release tablet 10 mg  10 mg Oral Q4H PRN   • pantoprazole (PROTONIX) EC tablet 40 mg  40 mg Oral Early Morning   • phenol (CHLORASEPTIC) 1 4 % mucosal liquid 1 spray  1 spray Mouth/Throat Q2H PRN       Allergies   Allergen Reactions   • Shellfish-Derived Products - Food Allergy Anaphylaxis   • Erythromycin GI Intolerance       OBJECTIVE:    Physical Exam  Physical Exam  Vitals reviewed  Constitutional:       General: He is not in acute distress  Appearance: He is ill-appearing  HENT:      Head: Atraumatic  Right Ear: External ear normal       Left Ear: External ear normal       Nose:      Comments: HFNC in place  Eyes:      General:         Right eye: No discharge  Left eye: No discharge  Cardiovascular:      Rate and Rhythm: Normal rate  Pulmonary:      Effort: No respiratory distress  Skin:     General: Skin is warm and dry  Neurological:      Mental Status: He is alert        Comments: Patient fatigued, but awake, alert, and conversant         Lab Results:   I have personally reviewed pertinent labs  , CBC:   Lab Results   Component Value Date    WBC 15 50 (H) 11/14/2022    HGB 7 6 (L) 11/14/2022    HCT 23 4 (L) 11/14/2022    MCV 92 11/14/2022     11/14/2022    MCH 29 8 11/14/2022    MCHC 32 5 11/14/2022    RDW 14 6 11/14/2022    MPV 10 5 11/14/2022   , CMP:   Lab Results   Component Value Date    SODIUM 143 11/14/2022    K 3 6 11/14/2022     (H) 11/14/2022    CO2 25 11/14/2022    BUN 35 (H) 11/14/2022    CREATININE 1 26 11/14/2022    CALCIUM 8 2 (L) 11/14/2022    AST 49 (H) 11/14/2022    ALT 46 11/14/2022    ALKPHOS 228 (H) 11/14/2022    EGFR 62 11/14/2022   , BMP:  Lab Results   Component Value Date    SODIUM 143 11/14/2022    K 3 6 11/14/2022     (H) 11/14/2022    CO2 25 11/14/2022    BUN 35 (H) 11/14/2022    CREATININE 1 26 11/14/2022    GLUC 192 (H) 11/14/2022    CALCIUM 8 2 (L) 11/14/2022    AGAP 7 11/14/2022    EGFR 62 11/14/2022         Counseling / Coordination of Care    Total floor / unit time spent today 20+ minutes  Greater than 50% of total time was spent with the patient and / or family counseling and / or coordination of care  A description of the counseling / coordination of care: cliffn assessment, med review, med mgmt

## 2022-11-14 NOTE — PROGRESS NOTES
Progress Note - Infectious Disease   Tammy Burnett 61 y o  male MRN: [id number]  Unit/Bed#: MICU 02 Encounter: [id number]      Impression/Recommendations:  1  Sepsis, secondary to bacteremia  Patient is clinically much improved  Fever was prolonged but finally resolved  WBC decreasing  Patient remains systemically well, without toxicity and hemodynamically stable, without hypotension  Antibiotic plan as in below  Monitor temperature/WBC  Monitor hemodynamics  2  ESBL producing E coli bacteremia, likely secondary to gut translocation during recent colon surgery  Patient is clinically improved  Bacteremia clearing  Continue IV ertapenem  Treat x7 days total, through tomorrow  3  Metastatic colon cancer, with liver metastases  Patient is status post transverse colon resection  Surgical oncology follow-up  4  ALEXY, superimposed on CKD  Creatinine is improved  Antibiotic at full dose  Monitor creatinine  5  Acute hypoxic respiratory failure, previously on ventilator  Patient is now extubated  Monitor respiratory status  6  Encephalopathy, multifactorial   Mental status is much improved  Monitor mental status  7  Type 2 DM  Discussed with patient and his wife in detail regarding the above plan  Antibiotics:  Ertapenem # 6    Subjective:  Patient remains in ICU  He is extubated  Dyspnea is mild  Abdominal pain mild and controlled  Temperature stays down  No chills  He is tolerating antibiotic well  No nausea, vomiting or diarrhea  Objective:  Vitals:  Temp:  [97 7 °F (36 5 °C)-98 °F (36 7 °C)] 98 °F (36 7 °C)  HR:  [] 98  Resp:  [12-27] 16  BP: (136-144)/(79-82) 136/82  SpO2:  [87 %-100 %] 95 %  Temp (24hrs), Av 9 °F (36 6 °C), Min:97 7 °F (36 5 °C), Max:98 °F (36 7 °C)  Current: Temperature: 98 °F (36 7 °C)    Physical Exam:     General: Awake, alert, cooperative, no distress  Neck:  Supple  No mass  No lymphadenopathy     Lungs: Expansion symmetric, basilar rhonchi no rales, no wheezing, respirations unlabored  Heart:  Regular rate and rhythm, S1 and S2 normal, no murmur  Abdomen: Soft, mild distention, incision clean, with mild tenderness  Normal bowel sounds  Extremities: Trace leg edema  No erythema/warmth  No ulcer  Nontender to palpation  Skin:  No rash  Neuro: Moves all extremities  Invasive Devices  Report    Central Venous Catheter Line  Duration           Port A Cath 03/10/22 Right Chest 249 days          Peripheral Intravenous Line  Duration           Peripheral IV 11/10/22 Dorsal (posterior); Right Wrist 4 days    Peripheral IV 11/13/22 Right;Upper;Ventral (anterior) Arm 1 day          Arterial Line  Duration           Arterial Line 11/08/22 Radial 5 days                Labs studies:   I have personally reviewed pertinent labs  Results from last 7 days   Lab Units 11/14/22  0545 11/13/22  0442 11/12/22  0604   POTASSIUM mmol/L 3 6 3 7 3 9   CHLORIDE mmol/L 111* 112* 111*   CO2 mmol/L 25 26 23   BUN mg/dL 35* 45* 39*   CREATININE mg/dL 1 26 1 48* 1 59*   EGFR ml/min/1 73sq m 62 51 47   CALCIUM mg/dL 8 2* 8 1* 8 2*   AST U/L 49* 69* 65*   ALT U/L 46 61 62   ALK PHOS U/L 228* 265* 304*     Results from last 7 days   Lab Units 11/14/22  0545 11/13/22  1230 11/13/22  0442   WBC Thousand/uL 15 50* 16 15* 15 65*   HEMOGLOBIN g/dL 7 6* 8 0* 6 7*   PLATELETS Thousands/uL 249 230 243     Results from last 7 days   Lab Units 11/10/22  0450 11/08/22  2230   BLOOD CULTURE  No Growth After 4 Days  No Growth After 4 Days  Escherichia coli ESBL*  Escherichia coli ESBL*   GRAM STAIN RESULT   --  Gram negative rods*  Gram negative rods*       Imaging Studies:   I have personally reviewed pertinent imaging study reports and images in PACS  EKG, Pathology, and Other Studies:   I have personally reviewed pertinent reports

## 2022-11-14 NOTE — PLAN OF CARE
Problem: MOBILITY - ADULT  Goal: Maintain or return to baseline ADL function  Description: INTERVENTIONS:  -  Assess patient's ability to carry out ADLs; assess patient's baseline for ADL function and identify physical deficits which impact ability to perform ADLs (bathing, care of mouth/teeth, toileting, grooming, dressing, etc )  - Assess/evaluate cause of self-care deficits   - Assess range of motion  - Assess patient's mobility; develop plan if impaired  - Assess patient's need for assistive devices and provide as appropriate  - Encourage maximum independence but intervene and supervise when necessary  - Involve family in performance of ADLs  - Assess for home care needs following discharge   - Consider OT consult to assist with ADL evaluation and planning for discharge  - Provide patient education as appropriate  Outcome: Progressing  Goal: Maintains/Returns to pre admission functional level  Description: INTERVENTIONS:  - Perform BMAT or MOVE assessment daily    - Set and communicate daily mobility goal to care team and patient/family/caregiver  - Collaborate with rehabilitation services on mobility goals if consulted  - Perform Range of Motion  times a day  - Reposition patient every  hours    - Dangle patient  times a day  - Stand patient  times a day  - Ambulate patient  times a day  - Out of bed to chair  times a day   - Out of bed for meals  times a day  - Out of bed for toileting  - Record patient progress and toleration of activity level   Outcome: Progressing     Problem: Prexisting or High Potential for Compromised Skin Integrity  Goal: Skin integrity is maintained or improved  Description: INTERVENTIONS:  - Identify patients at risk for skin breakdown  - Assess and monitor skin integrity  - Assess and monitor nutrition and hydration status  - Monitor labs   - Assess for incontinence   - Turn and reposition patient  - Assist with mobility/ambulation  - Relieve pressure over bony prominences  - Avoid friction and shearing  - Provide appropriate hygiene as needed including keeping skin clean and dry  - Evaluate need for skin moisturizer/barrier cream  - Collaborate with interdisciplinary team   - Patient/family teaching  - Consider wound care consult   Outcome: Progressing     Problem: Potential for Falls  Goal: Patient will remain free of falls  Description: INTERVENTIONS:  - Educate patient/family on patient safety including physical limitations  - Instruct patient to call for assistance with activity   - Consult OT/PT to assist with strengthening/mobility   - Keep Call bell within reach  - Keep bed low and locked with side rails adjusted as appropriate  - Keep care items and personal belongings within reach  - Initiate and maintain comfort rounds  - Make Fall Risk Sign visible to staff  - Offer Toileting every  Hours, in advance of need  - Initiate/Maintain alarm  - Obtain necessary fall risk management equipment:   - Apply yellow socks and bracelet for high fall risk patients  - Consider moving patient to room near nurses station  Outcome: Progressing     Problem: Nutrition/Hydration-ADULT  Goal: Nutrient/Hydration intake appropriate for improving, restoring or maintaining nutritional needs  Description: Monitor and assess patient's nutrition/hydration status for malnutrition  Collaborate with interdisciplinary team and initiate plan and interventions as ordered  Monitor patient's weight and dietary intake as ordered or per policy  Utilize nutrition screening tool and intervene as necessary  Determine patient's food preferences and provide high-protein, high-caloric foods as appropriate       INTERVENTIONS:  - Monitor oral intake, urinary output, labs, and treatment plans  - Assess nutrition and hydration status and recommend course of action  - Evaluate amount of meals eaten  - Assist patient with eating if necessary   - Allow adequate time for meals  - Recommend/ encourage appropriate diets, oral nutritional supplements, and vitamin/mineral supplements  - Order, calculate, and assess calorie counts as needed  - Recommend, monitor, and adjust tube feedings and TPN/PPN based on assessed needs  - Assess need for intravenous fluids  - Provide specific nutrition/hydration education as appropriate  - Include patient/family/caregiver in decisions related to nutrition  Outcome: Progressing     Problem: PAIN - ADULT  Goal: Verbalizes/displays adequate comfort level or baseline comfort level  Description: Interventions:  - Encourage patient to monitor pain and request assistance  - Assess pain using appropriate pain scale  - Administer analgesics based on type and severity of pain and evaluate response  - Implement non-pharmacological measures as appropriate and evaluate response  - Consider cultural and social influences on pain and pain management  - Notify physician/advanced practitioner if interventions unsuccessful or patient reports new pain  Outcome: Progressing     Problem: INFECTION - ADULT  Goal: Absence or prevention of progression during hospitalization  Description: INTERVENTIONS:  - Assess and monitor for signs and symptoms of infection  - Monitor lab/diagnostic results  - Monitor all insertion sites, i e  indwelling lines, tubes, and drains  - Monitor endotracheal if appropriate and nasal secretions for changes in amount and color  - Kane appropriate cooling/warming therapies per order  - Administer medications as ordered  - Instruct and encourage patient and family to use good hand hygiene technique  - Identify and instruct in appropriate isolation precautions for identified infection/condition  Outcome: Progressing  Goal: Absence of fever/infection during neutropenic period  Description: INTERVENTIONS:  - Monitor WBC    Outcome: Progressing     Problem: SAFETY ADULT  Goal: Maintain or return to baseline ADL function  Description: INTERVENTIONS:  -  Assess patient's ability to carry out ADLs; assess patient's baseline for ADL function and identify physical deficits which impact ability to perform ADLs (bathing, care of mouth/teeth, toileting, grooming, dressing, etc )  - Assess/evaluate cause of self-care deficits   - Assess range of motion  - Assess patient's mobility; develop plan if impaired  - Assess patient's need for assistive devices and provide as appropriate  - Encourage maximum independence but intervene and supervise when necessary  - Involve family in performance of ADLs  - Assess for home care needs following discharge   - Consider OT consult to assist with ADL evaluation and planning for discharge  - Provide patient education as appropriate  Outcome: Progressing  Goal: Maintains/Returns to pre admission functional level  Description: INTERVENTIONS:  - Perform BMAT or MOVE assessment daily    - Set and communicate daily mobility goal to care team and patient/family/caregiver  - Collaborate with rehabilitation services on mobility goals if consulted  - Perform Range of Motion  times a day  - Reposition patient every  hours    - Dangle patient  times a day  - Stand patient  times a day  - Ambulate patient  times a day  - Out of bed to chair  times a day   - Out of bed for meals  times a day  - Out of bed for toileting  - Record patient progress and toleration of activity level   Outcome: Progressing  Goal: Patient will remain free of falls  Description: INTERVENTIONS:  - Educate patient/family on patient safety including physical limitations  - Instruct patient to call for assistance with activity   - Consult OT/PT to assist with strengthening/mobility   - Keep Call bell within reach  - Keep bed low and locked with side rails adjusted as appropriate  - Keep care items and personal belongings within reach  - Initiate and maintain comfort rounds  - Make Fall Risk Sign visible to staff  - Offer Toileting every  Hours, in advance of need  - Initiate/Maintain alarm  - Obtain necessary fall risk management equipment:  - Apply yellow socks and bracelet for high fall risk patients  - Consider moving patient to room near nurses station  Outcome: Progressing     Problem: DISCHARGE PLANNING  Goal: Discharge to home or other facility with appropriate resources  Description: INTERVENTIONS:  - Identify barriers to discharge w/patient and caregiver  - Arrange for needed discharge resources and transportation as appropriate  - Identify discharge learning needs (meds, wound care, etc )  - Arrange for interpretive services to assist at discharge as needed  - Refer to Case Management Department for coordinating discharge planning if the patient needs post-hospital services based on physician/advanced practitioner order or complex needs related to functional status, cognitive ability, or social support system  Outcome: Progressing     Problem: Knowledge Deficit  Goal: Patient/family/caregiver demonstrates understanding of disease process, treatment plan, medications, and discharge instructions  Description: Complete learning assessment and assess knowledge base    Interventions:  - Provide teaching at level of understanding  - Provide teaching via preferred learning methods  Outcome: Progressing

## 2022-11-14 NOTE — PROGRESS NOTES
Pastoral Care Progress Note    2022  Patient: Sagrario Norman : 1965  Admission Date & Time: 2022 0515  MRN: [id number] CSN: [id number]      [facility] briefly visited with pt while doing rounds on unit  Pt resting comfortably and lying with eyes closed  After brief conversation about family and health, pt appeared to not be interested in conversation at this time, so  provided words of encouragement and offered to hold pt in prayer  Chaplains remain available                 Chaplaincy Interventions Utilized:   Exploration: Explored emotional needs & resources, Explored relational needs & resources, and Explored spiritual needs & resources    Collaboration: Consulted with interdisciplinary team    Relationship Building: Cultivated a relationship of care and support    Chaplaincy Outcomes Achieved:  Unknown outcome    Spiritual Coping Strategies Utilized:   Spiritual comfort         22 1300   Clinical Encounter Type   Visited With Patient   Crisis Visit Critical Care

## 2022-11-14 NOTE — PHYSICAL THERAPY NOTE
Physical Therapy re-eval    Patient Name: Gabriela Im    TYOGD'A Date: 11/14/2022     Problem List  Principal Problem:    Colon cancer metastasized to liver [facility])  Active Problems:    Benign essential hypertension    Type 2 diabetes mellitus with hyperlipidemia (HCC)    Malignant neoplasm of transverse colon (HCC)    Encephalopathy    Flash pulmonary edema (HCC)    MR (mitral regurgitation)    Bacteremia    ESBL (extended spectrum beta-lactamase) producing bacteria infection    Acute respiratory failure with hypoxia [facility])       Past Medical History  Past Medical History:   Diagnosis Date   • Abdominal pain 03/12/2022   • Acute renal failure ([facility] )     [id number] resolved   • Cancer ([facility] )    • Diabetes mellitus ([facility] )    • Enteritis 08/23/2016   • Gastroparesis due to DM ([facility] ) 08/23/2016   • GERD (gastroesophageal reflux disease)    • Hernia, ventral 08/04/2016   • Hyperlipidemia    • Hypertension    • Morbid obesity ([facility] ) 04/17/2018   • Postoperative visit 03/02/2022   • SIRS (systemic inflammatory response syndrome) ([facility] ) 03/12/2022   • Snoring    • Stage 3a chronic kidney disease ([facility] ) 02/19/2022        Past Surgical History  Past Surgical History:   Procedure Laterality Date   • COLONOSCOPY     • COLOSTOMY N/A 02/20/2022    Procedure: COLOSTOMY LOOP, diverting;  Surgeon: Jayme Evans MD;  Location: [facility];  Service: General   • ESOPHAGOGASTRODUODENOSCOPY N/A 08/24/2016    Procedure: ESOPHAGOGASTRODUODENOSCOPY (EGD); Surgeon: Elias Ornelas MD;  Location: [facility];   Service:    • ILEOSTOMY CLOSURE N/A 11/7/2022    Procedure: REVERSAL COLOSTOMY;  Surgeon: George Ordonez MD;  Location:  MAIN OR;  Service: Surgical Oncology   • IR PORT PLACEMENT  03/10/2022   • KIDNEY STONE SURGERY     • LIVER BIOPSY LAPAROSCOPIC N/A 02/20/2022    Procedure: DIAGNOSTIC LAPAROSCOPIC LIVER BIOPSY, DIVERTING LOOP COLOSTOMY ;  Surgeon: Jayme Evans MD;  Location: [facility];  Service: General   • LIVER LOBECTOMY N/A 11/7/2022    Procedure: SEGMENT 3 LIVER RESECTION, ABLATION, INTRAOPERATIVE U/S OF LIVER, PERITONEAL BIOPSY;  Surgeon: Matthew Kay MD;  Location: [facility];  Service: Surgical Oncology   • RIGHT COLON RESECTION N/A 11/7/2022    Procedure: EX LAP, TRANVERSE COLON RESECTION;  Surgeon: Matthew Kay MD;  Location: [facility];  Service: Surgical Oncology   • TONSILLECTOMY     • UMBILICAL HERNIA REPAIR LAPAROSCOPIC N/A 04/26/2019    Procedure: LAPAROSCOPIC UMBILICAL HERNIA REPAIR;  Surgeon: Nan Oh MD;  Location: [facility];  Service: General         11/14/22 11:33   PT Last Visit   PT Visit Date 11/14/22   Note Type   Note type Re-Evaluation   Pain Assessment   Pain Assessment Tool FLACC   Pain Rating: FLACC (Rest) - Face 1   Pain Rating: FLACC (Rest) - Legs 1   Pain Rating: FLACC (Rest) - Activity 0   Pain Rating: FLACC (Rest) - Cry 0   Pain Rating: FLACC (Rest) - Consolability 0   Score: FLACC (Rest) 2   Pain Rating: FLACC (Activity) - Face 1   Pain Rating: FLACC (Activity) - Legs 1   Pain Rating: FLACC (Activity) - Activity 1   Pain Rating: FLACC (Activity) - Cry 1   Pain Rating: FLACC (Activity) - Consolability 1   Score: FLACC (Activity) 5   Restrictions/Precautions   Weight Bearing Precautions Per Order No   Other Precautions Multiple lines;Telemetry; Fall Risk;Pain, contact precautions, HFNC   Home Living   Additional Comments see EI for additional information   Prior Function   Comments see IE for additional information   General   Family/Caregiver Present No   Cognition   Overall Cognitive Status WFL   Arousal/Participation Cooperative   Memory Within functional limits   RLE Assessment   RLE Assessment X   Strength RLE   R Hip Flexion 3-/5   R Knee Extension 3/5   R Ankle Dorsiflexion 3+/5   LLE Assessment   LLE Assessment X   Strength LLE   L Hip Flexion 3-/5   L Knee Extension 3/5   L Ankle Dorsiflexion 3+/5   Coordination   Sensation [facility] Touch   RLE Light Touch Impaired   RLE Light Touch Comments neuropathy at baseline   LLE Light Touch Impaired   LLE Light Touch Comments neuropathy at baseline   Bed Mobility   Supine to Sit 3  Moderate assistance   Additional items Assist x 2   Sit to Supine 3  Moderate assistance   Additional items Assist x 2   Additional Comments upon sitting EOB patient became dizzy, BP was within normal range  Pt also had increased pain in abdomen  Pt requested to return to supine   Balance   Static Sitting Fair -   Dynamic Sitting Poor +   Static Standing Zero   Endurance Deficit   Endurance Deficit Yes   Activity Tolerance   Activity Tolerance Patient limited by fatigue;Patient limited by pain   Medical Staff Made Aware OT   Nurse Made Aware nurse approved therapy session   Assessment   Prognosis Good   Problem List Decreased strength;Decreased endurance; Impaired balance;Decreased mobility;Pain   Assessment Pt presents to therapy today for a re-eval due to hypotensive and tachycardic over night  Pt transferred to MICU  Pt was intubated on 11/9  Pt was then extubated on 11/11  DX: HTN, HLD, colon CA with mets, ALEXY, hx of CKD, DM, acute blood loss anemia  Pt's impairments include reduced mobility pain, dizziness with mobility, poor endurance, O2 dependent, high risk of falling, reduced BLE stregnth, fatigue/lethargic  These impairments limit the patient by requiring increased assistance for mobility and places him at risk of falling  Pt would benefit from continued skilled therapy while in the hospital to improve overall mobility and work towards a safe d/c  Recommend rehab  At end of session patient was left supine with call bell within reach  Pt unable to get OOB to chair at this time due to dizziness and pain  Pt's BP and SpO2 were within normal range when sitting EOB  The patient's AM-PAC Basic Mobility Inpatient Short Form Raw Score is 7   A Raw score of less than or equal to 16 suggests the patient may benefit from discharge to post-acute rehabilitation services  Please also refer to the recommendation of the Physical Therapist for safe discharge planning  Goals   STG Expiration Date 11/28/22   Short Term Goal #1 STG 1: Pt will perform bed mobility at a S to safety return to PLOF  STG 2; PT will sit EOB at a I level for 15-20 minutes while performing dynamic and static balance  Will assess further mobility when appropriate and tolerated  PT Treatment Day 1   Plan   Treatment/Interventions Functional transfer training;LE strengthening/ROM; Endurance training; Therapeutic exercise; Bed mobility; Equipment eval/education;OT   PT Frequency 3-5x/wk   Recommendation   PT Discharge Recommendation Post acute rehabilitation services   AM-PAC Basic Mobility Inpatient   Turning in Bed Without Bedrails 2   Lying on Back to Sitting on Edge of Flat Bed 1   Moving Bed to Chair 1   Standing Up From Chair 1   Walk in Room 1   Climb 3-5 Stairs 1   Basic Mobility Inpatient Raw Score 7   Turning Head Towards Sound 4   Follow Simple Instructions 4   Low Function Basic Mobility Raw Score 15   Low Function Basic Mobility Standardized Score 23 9   Highest Level Of Mobility   JH-HLM Goal 2: Bed activities/Dependent transfer   JH-HLM Achieved 3: Sit at edge of bed   Erlin Pelayo, PT, DPT

## 2022-11-14 NOTE — PROGRESS NOTES
Progress Note - Surgical Oncology  Para Hector 61 y o  male MRN: [id number]  Unit/Bed#: MICU 02 Encounter: [id number]      Assessment:  62yo M POD3 s/p exploratory laparotomy, transverse colon resection, reversal of loop colostomy, peritoneal biopsy, and segment 3 hepatic resection w/ a post-operative course complicated by acute blood loss anemia due to LUQ hematoma and recurrent acute encephalopathy and hypoxic respiratory failure requiring endotracheal intubation, now extubated  Afebrile, VSS on 6L NC  Creatinine 1 48 yesterday from 1 59  White count 16 15 yesterday     Hb 6 yesterday   UOP; 1 2  L            Plan:  Advance diet as tolerated  Continue to wean O2 as tolerated  Aggressive pulm toilet  DVT px  Continue abx per ID through today (7 days total course)  Appreciate ICU level of care  Pain control per palliative; Subjective/Objective     Subjective: Weaned down to [employer] yesterday; had return of bowel function so advanced diet to diabetic regular  bipap overnight now on 6L NC  Pain controlled; no n/v/no flatus overnight  Straight cath x2 since rivero out  Objective:     Blood pressure 136/82, pulse 98, temperature 98 °F (36 7 °C), temperature source Oral, resp  rate 16, height 5' 8" (1 727 m), weight 121 kg (266 lb 1 5 oz), SpO2 95 %  ,Body mass index is 40 46 kg/m²  Physical Exam  Vitals reviewed  Constitutional:       General: He is not in acute distress  Appearance: He is not ill-appearing, toxic-appearing or diaphoretic  HENT:      Head: Normocephalic and atraumatic  Eyes:      Extraocular Movements: Extraocular movements intact  Cardiovascular:      Rate and Rhythm: Normal rate  Pulmonary:      Effort: Pulmonary effort is normal  No respiratory distress  Abdominal:      General: There is distension  Palpations: Abdomen is soft  Tenderness: There is abdominal tenderness  There is no guarding or rebound        Comments: Incision clean, dry and intact Skin:     General: Skin is warm and dry  Neurological:      Mental Status: He is alert and oriented to person, place, and time  Mental status is at baseline  Psychiatric:         Mood and Affect: Mood normal              Intake/Output Summary (Last 24 hours) at 11/14/2022 1727  Last data filed at 11/14/2022 0501  Gross per 24 hour   Intake 260 ml   Output 1085 ml   Net -825 ml       Invasive Devices  Report    Central Venous Catheter Line  Duration           Port A Cath 03/10/22 Right Chest 249 days          Peripheral Intravenous Line  Duration           Peripheral IV 11/10/22 Dorsal (posterior); Right Wrist 4 days    Peripheral IV 11/13/22 Right;Upper;Ventral (anterior) Arm 1 day          Arterial Line  Duration           Arterial Line 11/08/22 Radial 5 days                I have personally reviewed pertinent lab results    , CBC:   Lab Results   Component Value Date    WBC 15 50 (H) 11/14/2022    HGB 7 6 (L) 11/14/2022    HCT 23 4 (L) 11/14/2022    MCV 92 11/14/2022     11/14/2022    MCH 29 8 11/14/2022    MCHC 32 5 11/14/2022    RDW 14 6 11/14/2022    MPV 10 5 11/14/2022   , CMP:   Lab Results   Component Value Date    SODIUM 143 11/14/2022    K 3 6 11/14/2022     (H) 11/14/2022    CO2 25 11/14/2022    BUN 35 (H) 11/14/2022    CREATININE 1 26 11/14/2022    CALCIUM 8 2 (L) 11/14/2022    AST 49 (H) 11/14/2022    ALT 46 11/14/2022    ALKPHOS 228 (H) 11/14/2022    EGFR 62 11/14/2022   , Coagulation:   No results found for: PT, INR, APTT

## 2022-11-14 NOTE — PLAN OF CARE
Problem: OCCUPATIONAL THERAPY ADULT  Goal: Performs self-care activities at highest level of function for planned discharge setting  See evaluation for individualized goals  Description: Treatment Interventions: ADL retraining, Functional transfer training, UE strengthening/ROM, Endurance training, Patient/family training, Equipment evaluation/education, Compensatory technique education, Continued evaluation, Energy conservation, Activityengagement          See flowsheet documentation for full assessment, interventions and recommendations  Note: Limitation: Decreased ADL status, Decreased UE strength, Decreased Safe judgement during ADL, Decreased endurance, Decreased self-care trans, Decreased high-level ADLs, Decreased sensation  Prognosis: Fair  Assessment: Pt is a 62 y/o male seen for OT eval s/p adm to [facility] w/ malignant neoplasm of transverse colon w/ metastasis to the liver  Pt is s/p ex lap, transverse colon resection, segment 3 liver resection, ablation and reversal of colostomy on 11/7  Pt's hospital course was complicated by respiratory failure and bacteremia  Pt  has a past medical history of Abdominal pain (03/12/2022), Acute renal failure ([facility] ), Cancer ([place] [address] ), Diabetes mellitus ([place] [address] ), Enteritis (08/23/2016), Gastroparesis due to DM ([facility] ) (08/23/2016), GERD (gastroesophageal reflux disease), Hernia, ventral (08/04/2016), Hyperlipidemia, Hypertension, Morbid obesity ([facility] ) (04/17/2018), Postoperative visit (03/02/2022), SIRS (systemic inflammatory response syndrome) ([place] [address] ) (03/12/2022), Snoring, and Stage 3a chronic kidney disease ([place] [address] ) (02/19/2022)  Pt with active OT orders and up with assistance  orders  Pt [place] his spouse and family in 1 , [place]  Pt was I w/  ADLS and IADLS, drove, & required no use of DME PTA but has available a RW and cane if needed  Pt is currently demonstrating the following occupational deficits: Mod A UB ADLS, Max A LB ADLS, Mod A x2 bed mobility, Min-Mod A EOB sitting; unable to assess transfers/functional mobility at this time  These deficits that are impacting pt's baseline areas of occupation are a result of the following impairments: pain, endurance, activity tolerance, functional mobility, forward functional reach, balance, trunk control, functional standing tolerance, decreased I w/ ADLS/IADLS and strength  The following Occupational Performance Areas to address include: bathing/shower, toilet hygiene, dressing, health maintenance, functional mobility, community mobility, clothing management, household maintenance and job performance/volunteering  Recommend inpatient rehab  upon D/C   Pt to continue to benefit from acute immediate OT services to address the following goals 2-3x/week to  w/in 10-14 days:     OT Discharge Recommendation: Post acute rehabilitation services     Kaushal Gates MS, OTR/L

## 2022-11-14 NOTE — NUTRITION
11/14/22 1123   Biochemical Data,Medical Tests, and Procedures   Biochemical Data/Medical Tests/Procedures Lab values reviewed; Meds reviewed   Nutrition-Focused Physical Exam   Nutrition-Focused Physical Exam Findings RN skin assessment reviewed   Nutrition-Focused Physical Exam Findings +2 b/l UE, +1 b/l LE edema  BM 11/13   Recommendations/Interventions   Summary POD3 s/p ex lap, transverse colon resection, reversal of loop colostomy, and segment 3 hepatic resection w/ a post-op course complicated by acute blood loss anemia 2/2 LUQ hematoma and recurrent acute encephalopathy  Required intubation, now extubated  On clear liquid diet x 2 days, documented plans to advance as tolerated  Intake documented at 25% of dinner last night, inadequate  Recommendations to Provider 1  Diet advancement per provider  Goal of low fiber/low residue diet    2  Trial gelatein while on clear liquids

## 2022-11-14 NOTE — PROGRESS NOTES
Daily Progress Note - Critical Care   Radha Marshall 61 y o  male MRN: [id number]  Unit/Bed#: MICU 02 Encounter: [id number]        ----------------------------------------------------------------------------------------  HPI/24hr events:  Patient is a 18-year-old male with a significant past medical history of metastatic colon cancer, status post exploratory laparotomy transverse colon resection, reversal of loop colostomy, peritoneal biopsy, and segment 3 liver resection 11/07/2022  Postoperative course has been complicated by recurrent respiratory failure and ESBL bacteremia  Anemia yesterday with 1 unit pRBCs with improvement  No acute overnight events, stable throughout the night on HFNC at 85%     ---------------------------------------------------------------------------------------  SUBJECTIVE    Review of Systems   Constitutional: Positive for fatigue  Respiratory: Positive for shortness of breath  Gastrointestinal: Positive for abdominal pain  Musculoskeletal: Positive for back pain  All other systems reviewed and are negative      Review of systems was reviewed and negative unless stated above in HPI/24-hour events   ---------------------------------------------------------------------------------------  Assessment and Plan:    Neuro:   • Diagnosis: Pain Control  o Plan: tylenol tony, robaxin tony, oxycodone 5/10 prn, dilaudid 0 5 prn  o Ketamine gtt stopped yesterday  • Diagnosis: Delirium ppx  o Plan: CAM-ICU  o Maintain sleep wake cycle   • Diagnosis: Hx of depression  o Plan: continue home cymbalta    CV:   • Diagnosis: HTN  o Plan:  Amlodipine 5 BID  o Labetalol prn  • Diagnosis: HLD  o Plan: continue home statin    Pulm:  • Diagnosis: acute hypoxemic respiratory failure  o Likely 2/2 atelectasis, right upper lobe infiltrate  o Plan:  HFNC, wean as able  o Aggressive chest physiotherapy  o NT suctioning  o Upright positioning  o May need to consider BiPAP  o Possible need for wake bronchoscopy for airway clearance  o Consider IR thoracentesis today    GI:   • Diagnosis: Colon CA with mets  o Plan: Clear liquid diet as tolerated per respiratory status/surgery  o Monitor bowel function  • Diagnosis: GI ppx  o Plan: Protonix    :   • Diagnosis: ALEXY, hx of CKD, Now Improved  o Plan: Monitor UOP  o Continue diuresis for goal of -1 to 2L    F/E/N:   • F: None  • E: Replace to goal  • N: Clear Liquids    Heme/Onc:   • Diagnosis: Acute Blood Loss Anemia  o Likely related to RUQ hematom  o Plan: Monitor Hgb  o Transfuse as needed for hypotension or Hgb <7  • Diagnosis: DVT ppx  o Plan: SQH and SCDs    Endo:   • Diagnosis: Type 2 DM  o Plan: ISS  o Q6H glucose checks  o Goal blood glucose 140-180    ID:   • Diagnosis: ESBL bacteremia  o Repeat blood cultures now negative  o Plan: Continue ertapenem per ID to 11/15    MSK/Skin:   • Diagnosis: No acute issues  o Plan: PT/OT when able  o OOB as tolerated  o Frequent turning/repositioning    Patient appropriate for transfer out of the ICU today?: No  Disposition: Continue Critical Care   Code Status: Level 1 - Full Code  ---------------------------------------------------------------------------------------  ICU CORE MEASURES    Prophylaxis   VTE Pharmacologic Prophylaxis: Heparin  VTE Mechanical Prophylaxis: sequential compression device  Stress Ulcer Prophylaxis: Pantoprazole IV     ABCDE Protocol (if indicated)  Plan to perform spontaneous awakening trial today? Not applicable  Plan to perform spontaneous breathing trial today? Not applicable  Obvious barriers to extubation? Not applicable  CAM-ICU: Negative    Invasive Devices Review  Invasive Devices  Report    Central Venous Catheter Line  Duration           Port A Cath 03/10/22 Right Chest 248 days          Peripheral Intravenous Line  Duration           Peripheral IV 11/10/22 Dorsal (posterior); Right Wrist 4 days    Peripheral IV 11/10/22 Dorsal (posterior); Left Wrist 3 days    Peripheral IV 22 Right;Upper;Ventral (anterior) Arm <1 day          Arterial Line  Duration           Arterial Line 22 Radial 5 days          Drain  Duration           Urethral Catheter Latex 16 Fr  6 days              Can any invasive devices be discontinued today? Not applicable  ---------------------------------------------------------------------------------------  OBJECTIVE    Vitals   Vitals:    22 2100 22 2200 22 2300 22 0000   BP:       BP Location:       Pulse: 88 88 86 86   Resp:  14 12   Temp:    97 9 °F (36 6 °C)   TempSrc:    Oral   SpO2: 100% (!) 87% 97% 98%   Weight:       Height:         Temp (24hrs), Av 9 °F (36 6 °C), Min:97 7 °F (36 5 °C), Max:98 °F (36 7 °C)  Current: Temperature: 97 9 °F (36 6 °C)  HR: 90  BP: 145/86  RR: 20  SpO2: 96    Respiratory:  SpO2: SpO2: 95 %  Nasal Cannula O2 Flow Rate (L/min): 6 L/min    Invasive/non-invasive ventilation settings   Respiratory  Report   Lab Data (Last 4 hours)       0005            pH, Arterial       7 416             pCO2, Arterial       42 6             pO2, Arterial       114 3             HCO3, Arterial       26 8             Base Excess, Arterial       2 0                  O2/Vent Data           Most Recent        Non-Invasive Ventilation Mode   HFNC (High flow)                  Physical Exam  Vitals and nursing note reviewed  Constitutional:       General: He is not in acute distress  Appearance: He is well-developed  Interventions: Nasal cannula in place  HENT:      Head: Normocephalic and atraumatic  Eyes:      Extraocular Movements: Extraocular movements intact  Conjunctiva/sclera: Conjunctivae normal    Cardiovascular:      Rate and Rhythm: Normal rate and regular rhythm  Pulses: Normal pulses  Heart sounds: Normal heart sounds  No murmur heard  Pulmonary:      Effort: No respiratory distress  Breath sounds: Normal breath sounds     Abdominal:      Palpations: Abdomen is soft       Tenderness: There is no abdominal tenderness  Comments: Midline surgical incision, well healing, c/d/i   Musculoskeletal:         General: Normal range of motion  Cervical back: Neck supple  Right lower leg: No edema  Left lower leg: No edema  Comments: SCDs in place   Skin:     General: Skin is warm and dry  Capillary Refill: Capillary refill takes less than 2 seconds  Laboratory and Diagnostics:  Results from last 7 days   Lab Units 11/13/22  1230 11/13/22  0442 11/12/22  0604 11/11/22  0430 11/10/22  0450 11/10/22  0215 11/10/22  0000 11/09/22 2215 11/08/22  2230 11/08/22  1913 11/08/22  0631   WBC Thousand/uL 16 15* 15 65* 11 77* 10 24* 14 02*  --  9 18 1 40*   < > 10 68* 21 96*   HEMOGLOBIN g/dL 8 0* 6 7* 7 0* 7 4* 8 4* 8 5* 8 3* 9 7*   < > 8 6* 10 1*   HEMATOCRIT % 24 0* 20 3* 20 8* 21 6* 24 7* 25 0* 24 4* 29 6*   < > 26 6* 29 7*   PLATELETS Thousands/uL 230 243 198 146* 139*  --  133* 199   < > 225 327   NEUTROS PCT %  --   --   --  84*  --   --   --   --   --   --  88*   BANDS PCT %  --   --   --   --   --   --   --  6  --  4  --    MONOS PCT %  --   --   --  9  --   --   --   --   --   --  7   MONO PCT %  --  13*  --   --   --   --   --  0*  --  0*  --     < > = values in this interval not displayed       Results from last 7 days   Lab Units 11/13/22  0442 11/12/22  0604 11/11/22  0430 11/10/22  0450 11/09/22 2215 11/09/22  0423 11/08/22  1913   SODIUM mmol/L 142 143 142 140 138 137 135   POTASSIUM mmol/L 3 7 3 9 4 0 4 3 4 7 4 0 4 5   CHLORIDE mmol/L 112* 111* 112* 109* 109* 106 106   CO2 mmol/L 26 23 23 23 21 23 22   ANION GAP mmol/L 4 9 7 8 8 8 7   BUN mg/dL 45* 39* 40* 44* 38* 33* 31*   CREATININE mg/dL 1 48* 1 59* 1 70* 2 21* 2 33* 2 13* 2 28*   CALCIUM mg/dL 8 1* 8 2* 8 2* 8 1* 8 9 8 5 8 1*   GLUCOSE RANDOM mg/dL 197* 214* 156* 175* 167* 199* 217*   ALT U/L 61 62 86* 137*  --  222* 263*   AST U/L 69* 65* 68* 127*  --  308* 379*   ALK PHOS U/L 265* 304* 181* 247*  --  192* 179*   ALBUMIN g/dL 1 8* 1 8* 1 9* 1 7*  --  2 1* 2 0*   TOTAL BILIRUBIN mg/dL 1 08* 1 33* 1 01* 0 93  --  1 17* 0 69     Results from last 7 days   Lab Units 11/13/22  0442 11/12/22  0604 11/11/22  0430 11/10/22  0450 11/09/22  2215 11/09/22  0423 11/08/22  1913   MAGNESIUM mg/dL 2 5 2 6 2 7* 2 5 2 4 2 2 2 5   PHOSPHORUS mg/dL 4 2 4 5 4 5 5 0* 5 7* 3 4 3 7      Results from last 7 days   Lab Units 11/12/22  0604 11/09/22  2215   INR  1 10 1 24*   PTT seconds  --  37          Results from last 7 days   Lab Units 11/10/22  0450 11/10/22  0215 11/10/22  0000 11/09/22  2215 11/09/22  0737 11/09/22  0423 11/09/22  0207   LACTIC ACID mmol/L 2 0 2 4* 2 9* 5 6* 1 8 2 1* 2 6*     ABG:  Results from last 7 days   Lab Units 11/14/22  0005   PH ART  7 416   PCO2 ART mm Hg 42 6   PO2 ART mm Hg 114 3   HCO3 ART mmol/L 26 8   BASE EXC ART mmol/L 2 0   ABG SOURCE  Line, Arterial     VBG:  Results from last 7 days   Lab Units 11/14/22  0005 11/08/22  2330 11/08/22  2247   PH RUMA   --   --  7 429*   PCO2 RUMA mm Hg  --   --  33 8*   PO2 RUMA mm Hg  --   --  49 3*   HCO3 RUMA mmol/L  --   --  21 9*   BASE EXC RUMA mmol/L  --   --  -2 1   ABG SOURCE  Line, Arterial   < >  --     < > = values in this interval not displayed  Micro  Results from last 7 days   Lab Units 11/10/22  0450 11/08/22  2230   BLOOD CULTURE  No Growth at 72 hrs  No Growth at 72 hrs  Escherichia coli ESBL*  Escherichia coli ESBL*   GRAM STAIN RESULT   --  Gram negative rods*  Gram negative rods*       Imaging:  I have personally reviewed pertinent reports  Intake and Output  I/O       11/12 0701 11/13 0700 11/13 0701 11/14 0700    P  O   500    I V  (mL/kg) 231 1 (1 9) 20 4 (0 2)    Blood  400    IV Piggyback 50 60    Total Intake(mL/kg) 281 1 (2 3) 980 4 (8 1)    Urine (mL/kg/hr) 1980 (0 7) 1460 (0 5)    Stool 0     Total Output 1980 1460    Net -2794 -927 6          Unmeasured Stool Occurrence 1 x         UOP: 0 5 ml/kg/hr Height and Weights   Height: 5' 8" (172 7 cm)  IBW (Ideal Body Weight): 68 4 kg  Body mass index is 40 46 kg/m²  Weight (last 2 days)     None            Nutrition       Diet Orders   (From admission, onward)             Start     Ordered    11/12/22 1249  Diet Clear Liquid  Diet effective now        Comments: Surgical sips, no carbonation   References:    Nutrtion Support Algorithm Enteral vs  Parenteral   Question Answer Comment   Diet Type Clear Liquid    RD to adjust diet per protocol?  No        11/12/22 1251              Active Medications  Scheduled Meds:  Current Facility-Administered Medications   Medication Dose Route Frequency Provider Last Rate   • acetaminophen  975 mg Oral Q8H [address] Annabelle R NANETTE Huerta     • amLODIPine  10 mg Oral BID Rudi Medina PA-C     • atorvastatin  40 mg Oral Daily With Dinner Dorene Martines PA-C     • DULoxetine  60 mg Oral Daily Sudhir Huerta PA-C     • ertapenem  1,000 mg Intravenous Q24H GAURANG Bach Stopped (11/13/22 1558)   • glycopyrrolate  0 2 mg Intravenous Q4H PRN Richmond Cueto MD     • haloperidol lactate  2 mg Intramuscular Q30 Min PRN Richmond Cueto MD     • heparin (porcine)  5,000 Units Subcutaneous [facility] Mary Preston DO     • HYDROmorphone  0 5 mg Intravenous Q4H PRN Dorene Martines PA-C     • insulin lispro  1-6 Units Subcutaneous Q6H [address] Alicia Faye DO     • labetalol  10 mg Intravenous Q4H PRN Kwesi Queen DO     • levalbuterol  1 25 mg Nebulization Q6H Tracy Q To, DO      And   • sodium chloride  4 mL Nebulization Q6H Tracy Q To, DO     • LORazepam  1 mg Intravenous Q1H PRN Richmond Cueto MD     • methocarbamol  500 mg Oral BID Sudhir Huerta PA-C     • ondansetron  4 mg Intravenous Q4H PRN Lm Tripathi MD     • oxyCODONE  5 mg Oral Q4H PRN Dorene Martines PA-C      Or   • oxyCODONE  10 mg Oral Q4H PRN Dorene Martines PA-C     • pantoprazole  40 mg Oral Early Morning Annabelle Huerta PA-C     • phenol  1 spray Mouth/Throat Q2H PRN Albino Lema MD       Continuous Infusions:     PRN Meds:   glycopyrrolate, 0 2 mg, Q4H PRN  haloperidol lactate, 2 mg, Q30 Min PRN  HYDROmorphone, 0 5 mg, Q4H PRN  labetalol, 10 mg, Q4H PRN  LORazepam, 1 mg, Q1H PRN  ondansetron, 4 mg, Q4H PRN  oxyCODONE, 5 mg, Q4H PRN   Or  oxyCODONE, 10 mg, Q4H PRN  phenol, 1 spray, Q2H PRN        Allergies   Allergies   Allergen Reactions   • Shellfish-Derived Products - Food Allergy Anaphylaxis   • Erythromycin GI Intolerance     ---------------------------------------------------------------------------------------  Advance Directive and Living Will:      Power of :    POLST:    ---------------------------------------------------------------------------------------  Care Time Delivered:   No Critical Care time spent     [employer], DO      Portions of the record may have been created with voice recognition software  Occasional wrong word or "sound a like" substitutions may have occurred due to the inherent limitations of voice recognition software    Read the chart carefully and recognize, using context, where substitutions have occurred

## 2022-11-14 NOTE — PLAN OF CARE
Problem: MOBILITY - ADULT  Goal: Maintain or return to baseline ADL function  Description: INTERVENTIONS:  -  Assess patient's ability to carry out ADLs; assess patient's baseline for ADL function and identify physical deficits which impact ability to perform ADLs (bathing, care of mouth/teeth, toileting, grooming, dressing, etc )  - Assess/evaluate cause of self-care deficits   - Assess range of motion  - Assess patient's mobility; develop plan if impaired  - Assess patient's need for assistive devices and provide as appropriate  - Encourage maximum independence but intervene and supervise when necessary  - Involve family in performance of ADLs  - Assess for home care needs following discharge   - Consider OT consult to assist with ADL evaluation and planning for discharge  - Provide patient education as appropriate  Outcome: Progressing     Problem: Prexisting or High Potential for Compromised Skin Integrity  Goal: Skin integrity is maintained or improved  Description: INTERVENTIONS:  - Identify patients at risk for skin breakdown  - Assess and monitor skin integrity  - Assess and monitor nutrition and hydration status  - Monitor labs   - Assess for incontinence   - Turn and reposition patient  - Assist with mobility/ambulation  - Relieve pressure over bony prominences  - Avoid friction and shearing  - Provide appropriate hygiene as needed including keeping skin clean and dry  - Evaluate need for skin moisturizer/barrier cream  - Collaborate with interdisciplinary team   - Patient/family teaching  - Consider wound care consult   Outcome: Progressing     Problem: Nutrition/Hydration-ADULT  Goal: Nutrient/Hydration intake appropriate for improving, restoring or maintaining nutritional needs  Description: Monitor and assess patient's nutrition/hydration status for malnutrition  Collaborate with interdisciplinary team and initiate plan and interventions as ordered    Monitor patient's weight and dietary intake as ordered or per policy  Utilize nutrition screening tool and intervene as necessary  Determine patient's food preferences and provide high-protein, high-caloric foods as appropriate       INTERVENTIONS:  - Monitor oral intake, urinary output, labs, and treatment plans  - Assess nutrition and hydration status and recommend course of action  - Evaluate amount of meals eaten  - Assist patient with eating if necessary   - Allow adequate time for meals  - Recommend/ encourage appropriate diets, oral nutritional supplements, and vitamin/mineral supplements  - Order, calculate, and assess calorie counts as needed  - Recommend, monitor, and adjust tube feedings and TPN/PPN based on assessed needs  - Assess need for intravenous fluids  - Provide specific nutrition/hydration education as appropriate  - Include patient/family/caregiver in decisions related to nutrition  Outcome: Progressing     Problem: PAIN - ADULT  Goal: Verbalizes/displays adequate comfort level or baseline comfort level  Description: Interventions:  - Encourage patient to monitor pain and request assistance  - Assess pain using appropriate pain scale  - Administer analgesics based on type and severity of pain and evaluate response  - Implement non-pharmacological measures as appropriate and evaluate response  - Consider cultural and social influences on pain and pain management  - Notify physician/advanced practitioner if interventions unsuccessful or patient reports new pain  Outcome: Progressing

## 2022-11-14 NOTE — OCCUPATIONAL THERAPY NOTE
Occupational Therapy Evaluation     Patient Name: Radha Marshall  VVWSX'A Date: 11/14/2022  Problem List  Principal Problem:    Colon cancer metastasized to liver [facility])  Active Problems:    Benign essential hypertension    Type 2 diabetes mellitus with hyperlipidemia (HCC)    Malignant neoplasm of transverse colon (HCC)    Encephalopathy    Flash pulmonary edema (HCC)    MR (mitral regurgitation)    Bacteremia    ESBL (extended spectrum beta-lactamase) producing bacteria infection    Acute respiratory failure with hypoxia ([facility] )    Past Medical History  Past Medical History:   Diagnosis Date    Abdominal pain 03/12/2022    Acute renal failure ([facility] )     [id number] resolved    Cancer ([facility] )     Diabetes mellitus ([facility] )     Enteritis 08/23/2016    Gastroparesis due to DM ([facility] ) 08/23/2016    GERD (gastroesophageal reflux disease)     Hernia, ventral 08/04/2016    Hyperlipidemia     Hypertension     Morbid obesity ([facility] ) 04/17/2018    Postoperative visit 03/02/2022    SIRS (systemic inflammatory response syndrome) ([facility] ) 03/12/2022    Snoring     Stage 3a chronic kidney disease ([facility] ) 02/19/2022     Past Surgical History  Past Surgical History:   Procedure Laterality Date    COLONOSCOPY      COLOSTOMY N/A 02/20/2022    Procedure: COLOSTOMY LOOP, diverting;  Surgeon: Valerie Moya MD;  Location: [facility] MAIN OR;  Service: General    ESOPHAGOGASTRODUODENOSCOPY N/A 08/24/2016    Procedure: ESOPHAGOGASTRODUODENOSCOPY (EGD); Surgeon: Chante Sanchez MD;  Location: [facility] GI LAB;   Service:     ILEOSTOMY CLOSURE N/A 11/7/2022    Procedure: REVERSAL COLOSTOMY;  Surgeon: Wes Madera MD;  Location: [facility] MAIN OR;  Service: Surgical Oncology    IR PORT PLACEMENT  03/10/2022    KIDNEY STONE SURGERY      LIVER BIOPSY LAPAROSCOPIC N/A 02/20/2022    Procedure: DIAGNOSTIC LAPAROSCOPIC LIVER BIOPSY, DIVERTING LOOP COLOSTOMY ;  Surgeon: Valerie Moya MD;  Location: [facility];  Service: General    LIVER LOBECTOMY N/A 11/7/2022    Procedure: SEGMENT 3 LIVER RESECTION, ABLATION, INTRAOPERATIVE U/S OF LIVER, PERITONEAL BIOPSY;  Surgeon: Dempsey Fabry, MD;  Location: [facility] MAIN OR;  Service: Surgical Oncology    RIGHT COLON RESECTION N/A 11/7/2022    Procedure: EX LAP, TRANVERSE COLON RESECTION;  Surgeon: Dempsey Fabry, MD;  Location: [facility] OR;  Service: Surgical Oncology    TONSILLECTOMY      UMBILICAL HERNIA REPAIR LAPAROSCOPIC N/A 04/26/2019    Procedure: LAPAROSCOPIC UMBILICAL HERNIA REPAIR;  Surgeon: Lyssa Moon MD;  Location: [facility];  Service: General           11/14/22 1133   OT Last Visit   OT Visit Date 11/14/22   Note Type   Note type Evaluation   Pain Assessment   Pain Assessment Tool FLACC   Pain Location/Orientation Orientation: Bilateral;Other (Comment)  (hands/feet)   Pain Onset/Description   (neuropathy)   Patient's Stated Pain Goal No pain   Hospital Pain Intervention(s) Repositioned; Ambulation/increased activity; Emotional support   Pain Rating: FLACC (Rest) - Face 1   Pain Rating: FLACC (Rest) - Legs 0   Pain Rating: FLACC (Rest) - Activity 0   Pain Rating: FLACC (Rest) - Cry 1   Pain Rating: FLACC (Rest) - Consolability 0   Score: FLACC (Rest) 2   Pain Rating: FLACC (Activity) - Face 1   Pain Rating: FLACC (Activity) - Legs 0   Pain Rating: FLACC (Activity) - Activity 0   Pain Rating: FLACC (Activity) - Cry 1   Pain Rating: FLACC (Activity) - Consolability 0   Score: FLACC (Activity) 2   Restrictions/Precautions   Weight Bearing Precautions Per Order No   Other Precautions Bed Alarm;Multiple lines;Telemetry;O2;Fall Risk;Pain;Contact/isolation  (HFNC, A-line)   Home Living   Type of Home House   Home Layout Multi-level  (2 [place]RIAN)   Bathroom Shower/Tub Walk-in shower   Bathroom Toilet Standard   Bathroom Equipment Other (Comment)  (denies any)   Home Equipment Walker;Cane  (unused PTA)   Prior Function   Level of [place] Independent with ADLs; Independent with functional mobility; Independent with IADLS   Lives With Family; Spouse Receives Help From Family   IADLs Independent with driving; Independent with meal prep; Independent with medication management   Falls in the last 6 months 0   Vocational On disability   Comments (+)    Lifestyle   Autonomy I w/ ADLS/IADLS, transfers and functional mobility PTA   Reciprocal Relationships Pt lives w/ his spouse and kids   Service to Others on SSD   Intrinsic Gratification Does Devendra Rahman; was [place] in the [employer] [facility] for the past 20 years; goes on tour  General   Additional Pertinent History Limited by reported dizziness, /81 and stomach pain   ADL   Eating Assistance 5  Supervision/Setup   Grooming Assistance 4  Minimal Assistance   UB Bathing Assistance 3  Moderate Assistance   LB Bathing Assistance 2  Maximal Assistance   UB Dressing Assistance 3  Moderate Assistance   LB Dressing Assistance 2  Maximal [address]  2  Maximal Assistance   Toileting Deficit Perineal hygiene;Use of bedpan/urinal setup; Setup;Steadying; Increased time to complete   Functional Assistance Other (Comment)   Functional Deficit   (bed level only)   Bed Mobility   Rolling R 3  Moderate assistance   Additional items Assist x 1; Increased time required;Verbal cues;LE management   Rolling L 3  Moderate assistance   Additional items Increased time required;Verbal cues;LE management   Supine to Sit 3  Moderate assistance   Additional items Assist x 1; Increased time required;Verbal cues;LE management;HOB elevated   Sit to Supine 3  Moderate assistance   Additional items HOB elevated;Assist x 2; Increased time required;Verbal cues;LE management   Additional Comments Pt sat EOB w/ flucuating Mod-Max A for sitting balance/trunk control; presents w/ posterior lean   Complains of dizziness/abdominal pain w/ EOB sitting   Transfers   Sit to Stand Unable to assess   Stand to Sit Unable to assess   Additional Comments Pt unable to tolerate at this time; will continue to assess   Functional Mobility Additional Comments Unable to assess @ this time   Balance   Static Sitting Poor +   Dynamic Sitting Poor   Static Standing Zero   Activity Tolerance   Activity Tolerance Patient limited by fatigue;Patient limited by pain;Treatment limited secondary to medical complications (Comment)   Medical Staff Made Aware ISABEL ,Bruna   Nurse Made Aware yes, Danial Joshua   RUE Assessment   RUE Assessment WFL  (increased generalized weakness)   LUE Assessment   LUE Assessment WFL  (increased generalized weakness)   Hand Function   Gross Motor Coordination Functional   Fine Motor Coordination Functional   Sensation   Light Touch   (reports worsening of his neuropathy in B/L Hands and feet)   Cognition   Overall Cognitive Status WFL   Arousal/Participation Responsive; Cooperative   Attention Attends with cues to redirect   Orientation Level Oriented X4   Memory Decreased recall of precautions   Following Commands Follows one step commands without difficulty   Comments Pt is pleasant and cooperative; limited by fatigue/pain/nausea  Assessment   Limitation Decreased ADL status; Decreased UE strength;Decreased Safe judgement during ADL;Decreased endurance;Decreased self-care trans;Decreased high-level ADLs; Decreased sensation   Prognosis Fair   Assessment Pt is a 60 y/o male seen for OT eval s/p adm to [facility] w/ malignant neoplasm of transverse colon w/ metastasis to the liver  Pt is s/p ex lap, transverse colon resection, segment 3 liver resection, ablation and reversal of colostomy on 11/7  Pt's hospital course was complicated by respiratory failure and bacteremia   Pt  has a past medical history of Abdominal pain (03/12/2022), Acute renal failure ([facility] ), Cancer ([facility] ), Diabetes mellitus ([facility] ), Enteritis (08/23/2016), Gastroparesis due to DM ([facility] ) (08/23/2016), GERD (gastroesophageal reflux disease), Hernia, ventral (08/04/2016), Hyperlipidemia, Hypertension, Morbid obesity ([address] ) (04/17/2018), Postoperative visit (03/02/2022), SIRS (systemic inflammatory response syndrome) ([facility] ) (2022), Snoring, and Stage 3a chronic kidney disease ([facility] ) (2022)  Pt with active OT orders and up with assistance  orders  Pt lives with his spouse and family in 1 [place], 2 [place]  Pt was I w/  ADLS and IADLS, drove, & required no use of DME PTA but has available a RW and cane if needed  Pt is currently demonstrating the following occupational deficits: Mod A UB ADLS, Max A LB ADLS, Mod A x2 bed mobility, Min-Mod A EOB sitting; unable to assess transfers/functional mobility at this time  These deficits that are impacting pt's baseline areas of occupation are a result of the following impairments: pain, endurance, activity tolerance, functional mobility, forward functional reach, balance, trunk control, functional standing tolerance, decreased I w/ ADLS/IADLS and strength  The following Occupational Performance Areas to address include: bathing/shower, toilet hygiene, dressing, health maintenance, functional mobility, community mobility, clothing management, household maintenance and job performance/volunteering  Recommend inpatient rehab  upon D/C  Pt to continue to benefit from acute immediate OT services to address the following goals 2-3x/week to  w/in 10-14 days:   Goals   Patient Goals to have less pain/nausea   LTG Time Frame 10-14   Long Term Goal #1 see below listed goals   Plan   Treatment Interventions ADL retraining;Functional transfer training;UE strengthening/ROM; Endurance training;Patient/family training;Equipment evaluation/education; Compensatory technique education;Continued evaluation; Energy conservation; Activityengagement   Goal Expiration Date 22   OT Frequency 2-3x/wk   Recommendation   OT Discharge Recommendation Post acute rehabilitation services   Additional Comments  The patient's raw score on the AM-PAC Daily Activity inpatient short form is 13, standardized score is 32 03, less than 39 4   Patients at this level are likely to benefit from discharge to post-acute rehabilitation services  Please refer to the recommendation of the Occupational Therapist for safe discharge planning   Additional Comments 2 Pt seen as a co-evaluation due to the patient's co-morbidities, clinically unstable presentation, and present impairments which are a regression from the patient's baseline   AM-PAC Daily Activity Inpatient   Lower Body Dressing 2   Bathing 2   Toileting 1   Upper Body Dressing 2   Grooming 3   Eating 3   Daily Activity Raw Score 13   Daily Activity Standardized Score (Calc for Raw Score >=11) 32 03   AM-PAC Applied Cognition Inpatient   Following a Speech/Presentation 3   Understanding Ordinary Conversation 4   Taking Medications 4   Remembering Where Things Are Placed or Put Away 4   Remembering List of 4-5 Errands 4   Taking Care of Complicated Tasks 3   Applied Cognition Raw Score 22   Applied Cognition Standardized Score 47 83   End of Consult   Education Provided Yes   Patient Position at End of Consult Supine;Bed/Chair alarm activated; All needs within reach   Nurse Communication Nurse aware of consult     GOALS    1) Pt will complete rolling left/right in bed with Min assist, as prerequisite for further engagement in ADLS   2) Pt will complete supine to sit transfer with Min A using B/L UEs to initiate bed mobility   3) Pt will tolerate sitting at EOB 20 minutes with S assist and stable vital signs, as prerequisite for further engagement in ADLS   4) Pt will complete grooming task with S assist and increased time to increase independence in functional tasks  5) Pt will increase B/L UE ROM 1/2 MMT to increase functional UB use during ADLS   6) Pt will complete UB ADLS with S and use of AD/DME as needed to increase independence in functional tasks  7) Pt will complete LB ADLS with Min A and use of AD/DME as needed to increase independence in functional tasks  8) Pt will follow 100% simple one step verbal commands and be A/Ox4 consistently t/o use of external environmental cues to increase awareness for functional tasks  9) Pt will demonstrate 100% carryover of E C  techniques t/o fx'l I/ADL/ leisure tasks w/o cues s/p skilled education  10) Pt will complete toileting w/ Min A w/ G hygiene/thoroughness using DME PRN  sed independence in functional tasks    ** OTR to assess transfers/functional mobility when appropriate    Roque Mejía MS, OTR/L

## 2022-11-14 NOTE — PLAN OF CARE
Problem: PHYSICAL THERAPY ADULT  Goal: Performs mobility at highest level of function for planned discharge setting  See evaluation for individualized goals  Description: Treatment/Interventions: ADL retraining, Functional transfer training, LE strengthening/ROM, Endurance training, Patient/family training, Equipment eval/education, Bed mobility, Gait training, Spoke to nursing, Spoke to case management          See flowsheet documentation for full assessment, interventions and recommendations  Note: Prognosis: Good  Problem List: Decreased strength, Decreased endurance, Impaired balance, Decreased mobility, Pain  Assessment: Pt presents to therapy today for a re-eval due to hypotensive and tachycardic over night  Pt transferred to MICU  Pt was intubated on 11/9  Pt was then extubated on 11/11  DX: HTN, HLD, colon CA with mets, ALEXY, hx of CKD, DM, acute blood loss anemia  Pt's impairments include reduced mobility pain, dizziness with mobility, poor endurance, O2 dependent, high risk of falling, reduced BLE stregnth, fatigue/lethargic  These impairments limit the patient by requiring increased assistance for mobility and places him at risk of falling  Pt would benefit from continued skilled therapy while in the hospital to improve overall mobility and work towards a safe d/c  Recommend rehab  At end of session patient was left supine with call bell within reach  Pt unable to get OOB to chair at this time due to dizziness and pain  Pt's BP and SpO2 were within normal range when sitting EOB  The patient's AM-PAC Basic Mobility Inpatient Short Form Raw Score is 7  A Raw score of less than or equal to 16 suggests the patient may benefit from discharge to post-acute rehabilitation services  Please also refer to the recommendation of the Physical Therapist for safe discharge planning  PT Discharge Recommendation: Post acute rehabilitation services    See flowsheet documentation for full assessment

## 2022-11-15 LAB
ANION GAP SERPL CALCULATED.3IONS-SCNC: 7 MMOL/L (ref 4–13)
BACTERIA BLD CULT: NORMAL
BACTERIA BLD CULT: NORMAL
BUN SERPL-MCNC: 31 MG/DL (ref 5–25)
CALCIUM SERPL-MCNC: 8.1 MG/DL (ref 8.3–10.1)
CHLORIDE SERPL-SCNC: 110 MMOL/L (ref 96–108)
CO2 SERPL-SCNC: 26 MMOL/L (ref 21–32)
CREAT SERPL-MCNC: 1.3 MG/DL (ref 0.6–1.3)
ERYTHROCYTE [DISTWIDTH] IN BLOOD BY AUTOMATED COUNT: 14.6 % (ref 11.6–15.1)
GFR SERPL CREATININE-BSD FRML MDRD: 60 ML/MIN/1.73SQ M
GLUCOSE SERPL-MCNC: 179 MG/DL (ref 65–140)
GLUCOSE SERPL-MCNC: 180 MG/DL (ref 65–140)
GLUCOSE SERPL-MCNC: 203 MG/DL (ref 65–140)
GLUCOSE SERPL-MCNC: 257 MG/DL (ref 65–140)
HCT VFR BLD AUTO: 22.6 % (ref 36.5–49.3)
HGB BLD-MCNC: 7.4 G/DL (ref 12–17)
MAGNESIUM SERPL-MCNC: 2.4 MG/DL (ref 1.6–2.6)
MCH RBC QN AUTO: 30.3 PG (ref 26.8–34.3)
MCHC RBC AUTO-ENTMCNC: 32.7 G/DL (ref 31.4–37.4)
MCV RBC AUTO: 93 FL (ref 82–98)
PHOSPHATE SERPL-MCNC: 3.9 MG/DL (ref 2.7–4.5)
PLATELET # BLD AUTO: 288 THOUSANDS/UL (ref 149–390)
PMV BLD AUTO: 10.6 FL (ref 8.9–12.7)
POTASSIUM SERPL-SCNC: 3.7 MMOL/L (ref 3.5–5.3)
RBC # BLD AUTO: 2.44 MILLION/UL (ref 3.88–5.62)
SODIUM SERPL-SCNC: 143 MMOL/L (ref 135–147)
WBC # BLD AUTO: 17.03 THOUSAND/UL (ref 4.31–10.16)

## 2022-11-15 RX ORDER — DICYCLOMINE HCL 20 MG
10 TABLET ORAL
Status: DISCONTINUED | OUTPATIENT
Start: 2022-11-15 | End: 2022-11-16

## 2022-11-15 RX ORDER — POTASSIUM CHLORIDE 20 MEQ/1
40 TABLET, EXTENDED RELEASE ORAL ONCE
Status: COMPLETED | OUTPATIENT
Start: 2022-11-15 | End: 2022-11-15

## 2022-11-15 RX ORDER — INSULIN GLARGINE 100 [IU]/ML
15 INJECTION, SOLUTION SUBCUTANEOUS
Status: DISCONTINUED | OUTPATIENT
Start: 2022-11-15 | End: 2022-11-16

## 2022-11-15 RX ORDER — DICYCLOMINE HYDROCHLORIDE 10 MG/5ML
10 SOLUTION ORAL
Status: DISCONTINUED | OUTPATIENT
Start: 2022-11-15 | End: 2022-11-15

## 2022-11-15 RX ORDER — INSULIN LISPRO 100 [IU]/ML
1-6 INJECTION, SOLUTION INTRAVENOUS; SUBCUTANEOUS
Status: DISCONTINUED | OUTPATIENT
Start: 2022-11-15 | End: 2022-11-16

## 2022-11-15 RX ORDER — INSULIN LISPRO 100 [IU]/ML
2-12 INJECTION, SOLUTION INTRAVENOUS; SUBCUTANEOUS
Status: DISCONTINUED | OUTPATIENT
Start: 2022-11-15 | End: 2022-11-16

## 2022-11-15 RX ORDER — LANOLIN ALCOHOL/MO/W.PET/CERES
6 CREAM (GRAM) TOPICAL
Status: DISCONTINUED | OUTPATIENT
Start: 2022-11-15 | End: 2022-11-16

## 2022-11-15 RX ORDER — POTASSIUM CHLORIDE 29.8 MG/ML
40 INJECTION INTRAVENOUS ONCE
Status: DISCONTINUED | OUTPATIENT
Start: 2022-11-15 | End: 2022-11-15

## 2022-11-15 RX ORDER — METOCLOPRAMIDE HYDROCHLORIDE 5 MG/ML
10 INJECTION INTRAMUSCULAR; INTRAVENOUS ONCE
Status: COMPLETED | OUTPATIENT
Start: 2022-11-15 | End: 2022-11-15

## 2022-11-15 RX ORDER — POTASSIUM CHLORIDE 20MEQ/15ML
40 LIQUID (ML) ORAL ONCE
Status: DISCONTINUED | OUTPATIENT
Start: 2022-11-15 | End: 2022-11-15

## 2022-11-15 RX ADMIN — ATORVASTATIN CALCIUM 40 MG: 40 TABLET, FILM COATED ORAL at 17:25

## 2022-11-15 RX ADMIN — ACETAMINOPHEN 975 MG: 325 TABLET, FILM COATED ORAL at 14:47

## 2022-11-15 RX ADMIN — HEPARIN SODIUM 5000 UNITS: 5000 INJECTION INTRAVENOUS; SUBCUTANEOUS at 21:35

## 2022-11-15 RX ADMIN — METHOCARBAMOL 500 MG: 500 TABLET ORAL at 08:39

## 2022-11-15 RX ADMIN — AMLODIPINE BESYLATE 10 MG: 10 TABLET ORAL at 08:39

## 2022-11-15 RX ADMIN — INSULIN LISPRO 4 UNITS: 100 INJECTION, SOLUTION INTRAVENOUS; SUBCUTANEOUS at 13:01

## 2022-11-15 RX ADMIN — OXYCODONE HYDROCHLORIDE 5 MG: 5 TABLET ORAL at 21:35

## 2022-11-15 RX ADMIN — INSULIN LISPRO 2 UNITS: 100 INJECTION, SOLUTION INTRAVENOUS; SUBCUTANEOUS at 07:50

## 2022-11-15 RX ADMIN — Medication 6 MG: at 21:35

## 2022-11-15 RX ADMIN — DULOXETINE HYDROCHLORIDE 60 MG: 60 CAPSULE, DELAYED RELEASE ORAL at 08:39

## 2022-11-15 RX ADMIN — ACETAMINOPHEN 975 MG: 325 TABLET, FILM COATED ORAL at 05:11

## 2022-11-15 RX ADMIN — HEPARIN SODIUM 5000 UNITS: 5000 INJECTION INTRAVENOUS; SUBCUTANEOUS at 14:47

## 2022-11-15 RX ADMIN — POTASSIUM CHLORIDE 40 MEQ: 1500 TABLET, EXTENDED RELEASE ORAL at 06:57

## 2022-11-15 RX ADMIN — OXYCODONE HYDROCHLORIDE 10 MG: 10 TABLET ORAL at 14:47

## 2022-11-15 RX ADMIN — ACETAMINOPHEN 975 MG: 325 TABLET, FILM COATED ORAL at 21:35

## 2022-11-15 RX ADMIN — ERTAPENEM SODIUM 1000 MG: 1 INJECTION, POWDER, LYOPHILIZED, FOR SOLUTION INTRAMUSCULAR; INTRAVENOUS at 17:27

## 2022-11-15 RX ADMIN — AMLODIPINE BESYLATE 10 MG: 10 TABLET ORAL at 17:25

## 2022-11-15 RX ADMIN — PANTOPRAZOLE SODIUM 40 MG: 40 TABLET, DELAYED RELEASE ORAL at 05:11

## 2022-11-15 RX ADMIN — OXYCODONE HYDROCHLORIDE 10 MG: 10 TABLET ORAL at 08:39

## 2022-11-15 RX ADMIN — INSULIN LISPRO 1 UNITS: 100 INJECTION, SOLUTION INTRAVENOUS; SUBCUTANEOUS at 21:35

## 2022-11-15 RX ADMIN — METOCLOPRAMIDE 10 MG: 5 INJECTION, SOLUTION INTRAMUSCULAR; INTRAVENOUS at 09:43

## 2022-11-15 RX ADMIN — DICYCLOMINE HYDROCHLORIDE 10 MG: 20 TABLET ORAL at 17:24

## 2022-11-15 RX ADMIN — METHOCARBAMOL 500 MG: 500 TABLET ORAL at 17:25

## 2022-11-15 RX ADMIN — INSULIN LISPRO 6 UNITS: 100 INJECTION, SOLUTION INTRAVENOUS; SUBCUTANEOUS at 17:26

## 2022-11-15 RX ADMIN — OXYCODONE HYDROCHLORIDE 5 MG: 5 TABLET ORAL at 02:17

## 2022-11-15 RX ADMIN — INSULIN GLARGINE 15 UNITS: 100 INJECTION, SOLUTION SUBCUTANEOUS at 21:35

## 2022-11-15 RX ADMIN — HEPARIN SODIUM 5000 UNITS: 5000 INJECTION INTRAVENOUS; SUBCUTANEOUS at 05:11

## 2022-11-15 RX ADMIN — Medication 10 MG: at 20:45

## 2022-11-15 NOTE — PROGRESS NOTES
Progress Note - Surgical Oncology  Sushant Tenorio 61 y o  male MRN: [id number]  [facility] Encounter: [id number]      Assessment:  62yo M POD8 s/p exploratory laparotomy, transverse colon resection, reversal of loop colostomy, peritoneal biopsy, and segment 3 hepatic resection w/ a post-operative course complicated by a LUQ hematoma requiring blood transfusion and hypoxic respiratory failure w/ recurrent encephalopathy requiring endotracheal intubation (now extubated)  Afebrile, hemodynamically stable, on BiPAP overnight and now 6L NC   UOP: 1,179cc/24hrs (0 4cc/kg/hr)  Passing flatus/bowel movements (once overnight, once this morning)  Tolerating regular diet    Plan:  - Continue regular diet as tolerated, monitor abdominal distention but patient having bowel function   - Needs aggressive pulmonary toilet but exhibiting decreasing oxygen requirements (on 6L via NC currently)  - Receiving final day of Ertapenem for resistant E  Coli bacteremia per infectious disease  - No evidence of ongoing hemorrhage; AM Hgb stable at 7 4 from 7 6 yesterday, repeat labs tomorrow and transfuse as needed  - Multimodal analgesia  - OOB/ambulation, physical therapy evaluation    Subjective/Objective     Subjective: The patient was stable overnight on BiPAP otherwise there were no events  This morning the patient reported moderate abdominal pain w/ no fevers/chills, nausea/emesis, or dyspnea/chest pain  He has been passing flatus and had two bowel movements, is tolerating his diet without issue, only sometimes using his incentive spirometer, and is not yet ambulating  Objective:     Blood pressure 119/77, pulse 88, temperature 99 1 °F (37 3 °C), temperature source Axillary, resp  rate 16, height 5' 8" (1 727 m), weight 121 kg (266 lb 1 5 oz), SpO2 95 %  ,Body mass index is 40 46 kg/m²      Gen: Awake, alert, and in no acute distress  Head: Normocephalic, atraumatic  Neck: No obvious JVD, trachea midline, receiving supplemental O2 via NC  Cardiovascular: Regular rate  Respiratory: In no acute respiratory distress w/ no use of accessory muscles of respiration receiving supplemental O2 via NC  Abd: Soft, distended, and appropriately TTP surrounding abdominal incisions closed w/ staples w/ no visible erythema or signs of infection  Extremities: No visible wounds/ulcerations to the B/L upper/lower extremities  Skin: Warm/dry  Psychiatric: Appropriate mood/affect    Intake/Output Summary (Last 24 hours) at 11/15/2022 0959  Last data filed at 11/15/2022 0600  Gross per 24 hour   Intake 480 ml   Output 1179 ml   Net -699 ml       Invasive Devices  Report    Central Venous Catheter Line  Duration           Port A Cath 03/10/22 Right Chest 249 days          Peripheral Intravenous Line  Duration           Peripheral IV 11/10/22 Dorsal (posterior); Right Wrist 5 days    Peripheral IV 11/13/22 Right;Upper;Ventral (anterior) Arm 2 days    Peripheral IV 11/14/22 Right;Ventral (anterior) Forearm <1 day                I have personally reviewed pertinent lab results    , CBC:   Lab Results   Component Value Date    WBC 17 03 (H) 11/15/2022    HGB 7 4 (L) 11/15/2022    HCT 22 6 (L) 11/15/2022    MCV 93 11/15/2022     11/15/2022    MCH 30 3 11/15/2022    MCHC 32 7 11/15/2022    RDW 14 6 11/15/2022    MPV 10 6 11/15/2022   , CMP:   Lab Results   Component Value Date    SODIUM 143 11/15/2022    K 3 7 11/15/2022     (H) 11/15/2022    CO2 26 11/15/2022    BUN 31 (H) 11/15/2022    CREATININE 1 30 11/15/2022    CALCIUM 8 1 (L) 11/15/2022    EGFR 60 11/15/2022   , Coagulation: No results found for: PT, INR, APTT

## 2022-11-15 NOTE — PROGRESS NOTES
Progress Note - Infectious Disease   Rd Quintanilla 61 y o  male MRN: [id number]  Unit/Bed#: MICU 02 Encounter: [id number]      Impression/Recommendations:  1  Sepsis, secondary to bacteremia  Patient is clinically much improved  Fever was prolonged but finally resolved  WBC improving  Patient remains systemically well, without toxicity and hemodynamically stable, without hypotension  Antibiotic plan as in below  Monitor temperature/WBC  Monitor hemodynamics      2  ESBL producing E coli bacteremia, likely secondary to gut translocation during recent colon surgery  Patient is clinically improved  Bacteremia clearing  Continue IV ertapenem  Treat x7 days total, complete today      3  Metastatic colon cancer, with liver metastases  Patient is status post transverse colon resection  Surgical oncology follow-up      4  ALEXY, superimposed on CKD  Creatinine is improved  Antibiotic at full dose  Monitor creatinine      5  Acute hypoxic respiratory failure, previously on ventilator  Patient is now extubated  Respiratory status stable off ventilator  Monitor respiratory status      6  Encephalopathy, multifactorial   Mental status is much improved  Monitor mental status      7  Type 2 DM      Discussed with patient and his wife in detail regarding the above plan      Antibiotics:  Ertapenem # 7     Subjective:  Patient remains in ICU  He remains extubated  Dyspnea improving  Abdominal pain still present but slowly improving  Temperature stays down  No chills  He is tolerating antibiotic well    No nausea, vomiting or diarrhea      Objective:  Vitals:  Temp:  [98 1 °F (36 7 °C)-99 2 °F (37 3 °C)] 98 1 °F (36 7 °C)  HR:  [] 92  Resp:  [13-32] 16  BP: (119-151)/(71-96) 150/83  SpO2:  [91 %-100 %] 95 %  Temp (24hrs), Av 8 °F (37 1 °C), Min:98 1 °F (36 7 °C), Max:99 2 °F (37 3 °C)  Current: Temperature: 98 1 °F (36 7 °C)    Physical Exam:     General: Awake, alert, cooperative, no distress  Neck:  Supple  No mass  No lymphadenopathy  Lungs: Expansion symmetric, no rales, no wheezing, respirations unlabored  Heart:  Regular rate and rhythm, S1 and S2 normal, no murmur  Abdomen: Soft, mildly distended  Incision clean, without purulence  Stable mild to moderate incisional tenderness  Normal bowel sounds  Extremities: Trace leg edema  No erythema/warmth  No ulcer  Nontender to palpation  Skin:  No rash  Neuro: Moves all extremities  Invasive Devices  Report    Central Venous Catheter Line  Duration           Port A Cath 03/10/22 Right Chest 250 days          Peripheral Intravenous Line  Duration           Peripheral IV 11/10/22 Dorsal (posterior); Right Wrist 5 days    Peripheral IV 11/13/22 Right;Upper;Ventral (anterior) Arm 2 days    Peripheral IV 11/14/22 Right;Ventral (anterior) Forearm <1 day          Drain  Duration           Urethral Catheter 16 Fr  <1 day                Labs studies:   I have personally reviewed pertinent labs  Results from last 7 days   Lab Units 11/15/22  0517 11/14/22  0545 11/13/22  0442 11/12/22  0604   POTASSIUM mmol/L 3 7 3 6 3 7 3 9   CHLORIDE mmol/L 110* 111* 112* 111*   CO2 mmol/L 26 25 26 23   BUN mg/dL 31* 35* 45* 39*   CREATININE mg/dL 1 30 1 26 1 48* 1 59*   EGFR ml/min/1 73sq m 60 62 51 47   CALCIUM mg/dL 8 1* 8 2* 8 1* 8 2*   AST U/L  --  49* 69* 65*   ALT U/L  --  46 61 62   ALK PHOS U/L  --  228* 265* 304*     Results from last 7 days   Lab Units 11/15/22  0517 11/14/22  0545 11/13/22  1230   WBC Thousand/uL 17 03* 15 50* 16 15*   HEMOGLOBIN g/dL 7 4* 7 6* 8 0*   PLATELETS Thousands/uL 288 249 230     Results from last 7 days   Lab Units 11/10/22  0450 11/08/22  2230   BLOOD CULTURE  No Growth After 5 Days  No Growth After 5 Days   Escherichia coli ESBL*  Escherichia coli ESBL*   GRAM STAIN RESULT   --  Gram negative rods*  Gram negative rods*       Imaging Studies:   I have personally reviewed pertinent imaging study reports and images in PACS  EKG, Pathology, and Other Studies:   I have personally reviewed pertinent reports

## 2022-11-15 NOTE — PROGRESS NOTES
Daily Progress Note - Critical Care   Judy Cervantes 61 y o  male MRN: [id number]  Unit/Bed#: MICU 02 Encounter: [id number]        ----------------------------------------------------------------------------------------  HPI: "Patient is a 69-year-old male with a significant past medical history of metastatic colon cancer, status post exploratory laparotomy transverse colon resection, reversal of loop colostomy, peritoneal biopsy, and segment 3 liver resection 11/07/2022  Postoperative course has been complicated by recurrent respiratory failure and ESBL bacteremia  Anemia yesterday with 1 unit pRBCs with improvement " -as per [facility]    24hr events: No acute events overnight  Patient remained on BiPAP throughout the night     ---------------------------------------------------------------------------------------  SUBJECTIVE  No acute complaints  Review of Systems   Constitutional: Negative for chills and fever  Respiratory: Negative for shortness of breath  Cardiovascular: Negative for chest pain  Gastrointestinal: Negative for abdominal pain       Review of systems was reviewed and negative unless stated above in HPI/24-hour events   ---------------------------------------------------------------------------------------  Assessment and Plan:    Neuro:   • Diagnosis: Analgesia  o Plan:   - Scheduled tylenol, robaxin, oxy 5/10mg, dilaudid for breakthrough  - APS following  • Diagnosis: Acute encephalopathy, resolved  o Plan:   - Delirium precautions  - CAM ICU  • Diagnosis: History of depression  o Plan:   - Continue home cymbalta      CV:   • Diagnosis: Paroxysmal atrial tachycardia  o Plan:   - Continuous cardiac monitoring  - Goal MAP >65  • Diagnosis: History of HTN  o Plan:   - Home amlodipine  - Prn labetalol  • Diagnosis: History of HLD  o Plan:  - Continue home atorvastatin      Pulm:  • Diagnosis: Acute hypoxic respiratory failure  o Likely in the setting of atelectasis, RUL infiltrate, poor inspiratory effort 2/2 pain  o Plan:   - BiPAP at night  - IS, pulm toilet  - Continuous pulse oximetry  - Maintain SpO2 > 92%      GI:   • Diagnosis: History of GERD  o Plan:   - Protonix      :   • Diagnosis: History of stage 3 CKD  o Plan:  - Previous ALEXY resolved  - Continue to monitor Cr on basic labs      F/E/N:   • Plan:  o F- none  o E- monitor and replete as needed, goal Mag >2, Phos >3, K >4  o N- gisela/CHO controlled diet ordered with nutritional supplements      Heme/Onc:   • Diagnosis: Acute blood loss anemia  o Secondary to RUQ hematoma  o Plan:   - Monitor H/H on routine labs, has been stable  • Diagnosis: DVT ppx  o Plan:   - SQH      Endo:   • Diagnosis: History of T2DM  o Plan:  - SSI  - Goal -180      ID:   • Diagnosis: ESBL E coli bacteremia  o Plan:  - Repeat bcx negative  - Continue ertapenem until 11/15 as per ID  - Appreciate ongoing ID recommendations      MSK/Skin:   • Diagnosis: No acute issues  o Plan:   - PT/OT  - Routine skin care    Patient appropriate for transfer out of ICU today?: Yes  Disposition: Transfer to Stepdown Level 2  Code Status: Level 1 - Full Code  ---------------------------------------------------------------------------------------  ICU CORE MEASURES    Prophylaxis   VTE Pharmacologic Prophylaxis: Heparin  VTE Mechanical Prophylaxis: sequential compression device  Stress Ulcer Prophylaxis: Pantoprazole PO    ABCDE Protocol (if indicated)  Plan to perform spontaneous awakening trial today? Not applicable  Plan to perform spontaneous breathing trial today? Not applicable  Obvious barriers to extubation? Not applicable  CAM-ICU: Negative    Invasive Devices Review  Invasive Devices  Report    Central Venous Catheter Line  Duration           Port A Cath 03/10/22 Right Chest 249 days          Peripheral Intravenous Line  Duration           Peripheral IV 11/10/22 Dorsal (posterior); Right Wrist 5 days    Peripheral IV 11/13/22 Right;Upper;Ventral (anterior) Arm 1 day    Peripheral IV 22 Right;Ventral (anterior) Forearm <1 day              Can any invasive devices be discontinued today? No  ---------------------------------------------------------------------------------------  OBJECTIVE    Vitals   Vitals:    22 2100 22 2200 22 2300 11/15/22 0000   BP: 131/81 147/82 135/79 129/79   BP Location:    Left arm   Pulse: 92 98 94 90   Resp:    Temp:    98 9 °F (37 2 °C)   TempSrc:    Axillary   SpO2: 94% 92% 99% 99%   Weight:       Height:         Temp (24hrs), Av 5 °F (36 9 °C), Min:98 °F (36 7 °C), Max:99 2 °F (37 3 °C)  Current: Temperature: 98 9 °F (37 2 °C)      Respiratory:  SpO2: SpO2: 91 %  Nasal Cannula O2 Flow Rate (L/min): 6 L/min    Invasive/non-invasive ventilation settings   Respiratory  Report   Lab Data (Last 4 hours)    None         O2/Vent Data (Last 4 hours)    None                Physical Exam  HENT:      Head: Normocephalic and atraumatic  Right Ear: External ear normal       Left Ear: External ear normal       Nose: Nose normal       Mouth/Throat:      Mouth: Mucous membranes are moist    Eyes:      General: No scleral icterus  Right eye: No discharge  Left eye: No discharge  Extraocular Movements: Extraocular movements intact  Conjunctiva/sclera: Conjunctivae normal    Cardiovascular:      Rate and Rhythm: Normal rate and regular rhythm  Heart sounds: Normal heart sounds  No murmur heard  No friction rub  No gallop  Pulmonary:      Effort: Pulmonary effort is normal       Breath sounds: Normal breath sounds  Comments: BiPAP in place  Abdominal:      General: Abdomen is flat  Tenderness: There is no abdominal tenderness  Genitourinary:     Comments: Deferred  Musculoskeletal:         General: Normal range of motion  Cervical back: Normal range of motion  Right lower leg: No edema  Left lower leg: No edema  Skin:     General: Skin is warm and dry  Neurological:      General: No focal deficit present  Mental Status: He is alert  Laboratory and Diagnostics:  Results from last 7 days   Lab Units 11/14/22  0545 11/13/22  1230 11/13/22  0442 11/12/22  0604 11/11/22  0430 11/10/22  0450 11/10/22  0215 11/10/22  0000 11/09/22 2215 11/08/22  2230 11/08/22  1913 11/08/22  0631   WBC Thousand/uL 15 50* 16 15* 15 65* 11 77* 10 24* 14 02*  --  9 18 1 40*   < > 10 68* 21 96*   HEMOGLOBIN g/dL 7 6* 8 0* 6 7* 7 0* 7 4* 8 4* 8 5* 8 3* 9 7*   < > 8 6* 10 1*   HEMATOCRIT % 23 4* 24 0* 20 3* 20 8* 21 6* 24 7* 25 0* 24 4* 29 6*   < > 26 6* 29 7*   PLATELETS Thousands/uL 249 230 243 198 146* 139*  --  133* 199   < > 225 327   NEUTROS PCT %  --   --   --   --  84*  --   --   --   --   --   --  88*   BANDS PCT %  --   --   --   --   --   --   --   --  6  --  4  --    MONOS PCT %  --   --   --   --  9  --   --   --   --   --   --  7   MONO PCT %  --   --  13*  --   --   --   --   --  0*  --  0*  --     < > = values in this interval not displayed       Results from last 7 days   Lab Units 11/14/22  0545 11/13/22  0442 11/12/22  0604 11/11/22  0430 11/10/22  0450 11/09/22 2215 11/09/22  0423 11/08/22  1913   SODIUM mmol/L 143 142 143 142 140 138 137 135   POTASSIUM mmol/L 3 6 3 7 3 9 4 0 4 3 4 7 4 0 4 5   CHLORIDE mmol/L 111* 112* 111* 112* 109* 109* 106 106   CO2 mmol/L 25 26 23 23 23 21 23 22   ANION GAP mmol/L 7 4 9 7 8 8 8 7   BUN mg/dL 35* 45* 39* 40* 44* 38* 33* 31*   CREATININE mg/dL 1 26 1 48* 1 59* 1 70* 2 21* 2 33* 2 13* 2 28*   CALCIUM mg/dL 8 2* 8 1* 8 2* 8 2* 8 1* 8 9 8 5 8 1*   GLUCOSE RANDOM mg/dL 192* 197* 214* 156* 175* 167* 199* 217*   ALT U/L 46 61 62 86* 137*  --  222* 263*   AST U/L 49* 69* 65* 68* 127*  --  308* 379*   ALK PHOS U/L 228* 265* 304* 181* 247*  --  192* 179*   ALBUMIN g/dL 1 7* 1 8* 1 8* 1 9* 1 7*  --  2 1* 2 0*   TOTAL BILIRUBIN mg/dL 1 18* 1 08* 1 33* 1 01* 0 93  --  1 17* 0 69     Results from last 7 days   Lab Units 11/14/22  0545 11/13/22  0442 11/12/22  0604 11/11/22  0430 11/10/22  0450 11/09/22  2215 11/09/22  0423   MAGNESIUM mg/dL 2 4 2 5 2 6 2 7* 2 5 2 4 2 2   PHOSPHORUS mg/dL 3 9 4 2 4 5 4 5 5 0* 5 7* 3 4      Results from last 7 days   Lab Units 11/12/22  0604 11/09/22  2215   INR  1 10 1 24*   PTT seconds  --  37          Results from last 7 days   Lab Units 11/10/22  0450 11/10/22  0215 11/10/22  0000 11/09/22  2215 11/09/22  0737 11/09/22  0423 11/09/22  0207   LACTIC ACID mmol/L 2 0 2 4* 2 9* 5 6* 1 8 2 1* 2 6*     ABG:  Results from last 7 days   Lab Units 11/14/22  0545   PH ART  7 494*   PCO2 ART mm Hg 31 9*   PO2 ART mm Hg 79 1   HCO3 ART mmol/L 24 0   BASE EXC ART mmol/L 0 8   ABG SOURCE  Line, Arterial     VBG:  Results from last 7 days   Lab Units 11/14/22  0545 11/08/22  2330 11/08/22  2247   PH RUMA   --   --  7 429*   PCO2 RUMA mm Hg  --   --  33 8*   PO2 RUMA mm Hg  --   --  49 3*   HCO3 RUMA mmol/L  --   --  21 9*   BASE EXC RUMA mmol/L  --   --  -2 1   ABG SOURCE  Line, Arterial   < >  --     < > = values in this interval not displayed  Micro  Results from last 7 days   Lab Units 11/10/22  0450 11/08/22  2230   BLOOD CULTURE  No Growth After 4 Days  No Growth After 4 Days  Escherichia coli ESBL*  Escherichia coli ESBL*   GRAM STAIN RESULT   --  Gram negative rods*  Gram negative rods*         Imaging:  I have personally reviewed pertinent reports  Intake and Output  I/O       11/13 0701 11/14 0700 11/14 0701  11/15 0700    P  O  500     I V  (mL/kg) 20 4 (0 2)     Blood 400     IV Piggyback 60     Total Intake(mL/kg) 980 4 (8 1)     Urine (mL/kg/hr) 1935 (0 7) 600 (0 2)    Total Output 1935 600    Net -954 6 -600                Height and Weights   Height: 5' 8" (172 7 cm)  IBW (Ideal Body Weight): 68 4 kg  Body mass index is 40 46 kg/m²    Weight (last 2 days)     None            Nutrition       Diet Orders   (From admission, onward)             Start     Ordered    11/14/22 1641  Diet Germán/CHO Controlled; Consistent Carbohydrate Diet Level 2 (5 carb servings/75 grams CHO/meal)  Diet effective now        Comments: Surgical sips, no carbonation   References:    Nutrtion Support Algorithm Enteral vs  Parenteral   Question Answer Comment   Diet Type Germán/CHO Controlled    Germán/CHO Controlled Consistent Carbohydrate Diet Level 2 (5 carb servings/75 grams CHO/meal)    RD to adjust diet per protocol?  No        11/14/22 1641    11/14/22 1219  Dietary nutrition supplements  Once        Question Answer Comment   Select Supplement: Ensure Plus High Protein Strawberry    Frequency Lunch, Dinner        11/14/22 1219                  Active Medications  Scheduled Meds:  Current Facility-Administered Medications   Medication Dose Route Frequency Provider Last Rate   • acetaminophen  975 mg Oral Q8H [address] Annabelle Huerta PA-C     • amLODIPine  10 mg Oral BID Jovani Rhodes PA-C     • atorvastatin  40 mg Oral Daily With Nemesio Steele PA-C     • DULoxetine  60 mg Oral Daily Sameer Gavin PA-C     • ertapenem  1,000 mg Intravenous Q24H GAURANG Winkler Stopped (11/14/22 2000)   • heparin (porcine)  5,000 Units Subcutaneous [facility] Jaylan Preston, DO     • HYDROmorphone  0 5 mg Intravenous Q4H PRN Sameer Gavin PA-C     • insulin glargine  10 Units Subcutaneous HS Maite Huerta PA-C     • insulin lispro  1-6 Units Subcutaneous 4x Daily (AC & HS) Maite Huerta PA-C     • labetalol  10 mg Intravenous Q4H PRN Rayne Robledo DO     • methocarbamol  500 mg Oral BID Maite Huerta PA-C     • ondansetron  4 mg Intravenous Q4H PRN Gil Patel MD     • oxyCODONE  5 mg Oral Q4H PRN Sameer Gavin PA-C      Or   • oxyCODONE  10 mg Oral Q4H PRN Maite Huerta PA-C     • pantoprazole  40 mg Oral Early Morning Annabelle Huerta PA-C     • phenol  1 spray Mouth/Throat Q2H PRN Reynaldo Mantilla MD       Continuous Infusions:     PRN Meds:   HYDROmorphone, 0 5 mg, Q4H PRN  labetalol, 10 mg, Q4H PRN  ondansetron, 4 mg, Q4H PRN  oxyCODONE, 5 mg, Q4H PRN   Or  oxyCODONE, 10 mg, Q4H PRN  phenol, 1 spray, Q2H PRN        Allergies   Allergies   Allergen Reactions   • Shellfish-Derived Products - Food Allergy Anaphylaxis   • Erythromycin GI Intolerance     ---------------------------------------------------------------------------------------  Advance Directive and Living Will:      Power of :    POLST:    ---------------------------------------------------------------------------------------  Care Time Delivered:   No Critical Care time spent     Melodie, DO      Portions of the record may have been created with voice recognition software  Occasional wrong word or "sound a like" substitutions may have occurred due to the inherent limitations of voice recognition software    Read the chart carefully and recognize, using context, where substitutions have occurred

## 2022-11-15 NOTE — PROGRESS NOTES
Progress Note - Cesar Anguiano 61 y o  male MRN: [id number]    Unit/Bed#: MICU 02 Encounter: [id number]      CC: diabetes f/u    Subjective: This is a 61y o -year-old male with past medical history of type 2 diabetes mellitus, hypertension, hyperlipidemia, GERD, iron deficiency anemia , 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 who presented to the hospital for ex lap, transverse colon resection, segment 3 liver resection, ablation, reversal of colostomy on 11/07, hospital course complicated with left upper quadrant hematoma and ESBL bacteremia   Endocrinology consulted for the management of type 2 diabetes mellitus in the setting of liver metastases  Feels ok  No complaints  No hypoglycemia  Objective:     Vitals: Blood pressure 150/83, pulse 92, temperature 98 1 °F (36 7 °C), temperature source Oral, resp  rate 16, height 5' 8" (1 727 m), weight 121 kg (266 lb 1 5 oz), SpO2 95 %  ,Body mass index is 40 46 kg/m²  Intake/Output Summary (Last 24 hours) at 11/15/2022 1231  Last data filed at 11/15/2022 1101  Gross per 24 hour   Intake 480 ml   Output 1704 ml   Net -1224 ml       Physical Exam:  General Appearance: awake, appears stated age and cooperative  Head: Normocephalic, without obvious abnormality, atraumatic  Extremities: moves all extremities  Skin: Skin color and temperature normal    Pulm: no labored breathing    Lab, Imaging and other studies: I have personally reviewed pertinent reports        POC Glucose (mg/dl)   Date Value   11/15/2022 203 (H)   11/14/2022 239 (H)   11/14/2022 294 (H)   11/14/2022 288 (H)   11/14/2022 205 (H)   11/14/2022 196 (H)   11/13/2022 191 (H)   11/13/2022 221 (H)   11/13/2022 235 (H)   11/12/2022 265 (H)       Assessment and plan:    Type 2 diabetes mellitus with hyperglycemia  HbA1c 8 0 September 2022  Home regimen:  Januvia 100 mg daily, metformin glyburide 5-500 mg QD, CGM [place]  Inpatient regimen:   Lantus 10 units at bedtime, correctional scale algo with meals and at bedtime     Recommendations:  Increase Lantus to 15 units from 10 units at bedtime  Will recommend to have correctional scale algorithm 1 with meals and at bedtime  Will continue to monitor blood glucose and make adjustments as needed Monitor for hypoglycemia and treat according to the protocol  Portions of the record may have been created with voice recognition software  Please Tigertext questions to the clinician covering the "[id number]" Role  Thank you

## 2022-11-15 NOTE — PLAN OF CARE
Problem: MOBILITY - ADULT  Goal: Maintain or return to baseline ADL function  Description: INTERVENTIONS:  -  Assess patient's ability to carry out ADLs; assess patient's baseline for ADL function and identify physical deficits which impact ability to perform ADLs (bathing, care of mouth/teeth, toileting, grooming, dressing, etc )  - Assess/evaluate cause of self-care deficits   - Assess range of motion  - Assess patient's mobility; develop plan if impaired  - Assess patient's need for assistive devices and provide as appropriate  - Encourage maximum independence but intervene and supervise when necessary  - Involve family in performance of ADLs  - Assess for home care needs following discharge   - Consider OT consult to assist with ADL evaluation and planning for discharge  - Provide patient education as appropriate  Outcome: Progressing  Goal: Maintains/Returns to pre admission functional level  Description: INTERVENTIONS:  - Perform BMAT or MOVE assessment daily    - Set and communicate daily mobility goal to care team and patient/family/caregiver  - Collaborate with rehabilitation services on mobility goals if consulted  - Perform Range of Motion 3 times a day  - Reposition patient every 2 hours    - Dangle patient 3 times a day  - Stand patient 3 times a day  - Ambulate patient 3 times a day  - Out of bed to chair 3 times a day   - Out of bed for meals 3 times a day  - Out of bed for toileting  - Record patient progress and toleration of activity level   Outcome: Progressing     Problem: Prexisting or High Potential for Compromised Skin Integrity  Goal: Skin integrity is maintained or improved  Description: INTERVENTIONS:  - Identify patients at risk for skin breakdown  - Assess and monitor skin integrity  - Assess and monitor nutrition and hydration status  - Monitor labs   - Assess for incontinence   - Turn and reposition patient  - Assist with mobility/ambulation  - Relieve pressure over bony prominences  - Avoid friction and shearing  - Provide appropriate hygiene as needed including keeping skin clean and dry  - Evaluate need for skin moisturizer/barrier cream  - Collaborate with interdisciplinary team   - Patient/family teaching  - Consider wound care consult   Outcome: Progressing     Problem: Potential for Falls  Goal: Patient will remain free of falls  Description: INTERVENTIONS:  - Educate patient/family on patient safety including physical limitations  - Instruct patient to call for assistance with activity   - Consult OT/PT to assist with strengthening/mobility   - Keep Call bell within reach  - Keep bed low and locked with side rails adjusted as appropriate  - Keep care items and personal belongings within reach  - Initiate and maintain comfort rounds  - Make Fall Risk Sign visible to staff  - Offer Toileting every 2 Hours, in advance of need  - Initiate/Maintain bed alarm  - Obtain necessary fall risk management equipment: bed alarm  - Apply yellow socks and bracelet for high fall risk patients  - Consider moving patient to room near nurses station  Outcome: Progressing     Problem: Nutrition/Hydration-ADULT  Goal: Nutrient/Hydration intake appropriate for improving, restoring or maintaining nutritional needs  Description: Monitor and assess patient's nutrition/hydration status for malnutrition  Collaborate with interdisciplinary team and initiate plan and interventions as ordered  Monitor patient's weight and dietary intake as ordered or per policy  Utilize nutrition screening tool and intervene as necessary  Determine patient's food preferences and provide high-protein, high-caloric foods as appropriate       INTERVENTIONS:  - Monitor oral intake, urinary output, labs, and treatment plans  - Assess nutrition and hydration status and recommend course of action  - Evaluate amount of meals eaten  - Assist patient with eating if necessary   - Allow adequate time for meals  - Recommend/ encourage appropriate diets, oral nutritional supplements, and vitamin/mineral supplements  - Order, calculate, and assess calorie counts as needed  - Recommend, monitor, and adjust tube feedings and TPN/PPN based on assessed needs  - Assess need for intravenous fluids  - Provide specific nutrition/hydration education as appropriate  - Include patient/family/caregiver in decisions related to nutrition  Outcome: Progressing     Problem: PAIN - ADULT  Goal: Verbalizes/displays adequate comfort level or baseline comfort level  Description: Interventions:  - Encourage patient to monitor pain and request assistance  - Assess pain using appropriate pain scale  - Administer analgesics based on type and severity of pain and evaluate response  - Implement non-pharmacological measures as appropriate and evaluate response  - Consider cultural and social influences on pain and pain management  - Notify physician/advanced practitioner if interventions unsuccessful or patient reports new pain  Outcome: Progressing     Problem: INFECTION - ADULT  Goal: Absence or prevention of progression during hospitalization  Description: INTERVENTIONS:  - Assess and monitor for signs and symptoms of infection  - Monitor lab/diagnostic results  - Monitor all insertion sites, i e  indwelling lines, tubes, and drains  - Monitor endotracheal if appropriate and nasal secretions for changes in amount and color  - [place] appropriate cooling/warming therapies per order  - Administer medications as ordered  - Instruct and encourage patient and family to use good hand hygiene technique  - Identify and instruct in appropriate isolation precautions for identified infection/condition  Outcome: Progressing  Goal: Absence of fever/infection during neutropenic period  Description: INTERVENTIONS:  - Monitor WBC    Outcome: Progressing     Problem: SAFETY ADULT  Goal: Maintain or return to baseline ADL function  Description: INTERVENTIONS:  -  Assess patient's ability to carry out ADLs; assess patient's baseline for ADL function and identify physical deficits which impact ability to perform ADLs (bathing, care of mouth/teeth, toileting, grooming, dressing, etc )  - Assess/evaluate cause of self-care deficits   - Assess range of motion  - Assess patient's mobility; develop plan if impaired  - Assess patient's need for assistive devices and provide as appropriate  - Encourage maximum independence but intervene and supervise when necessary  - Involve family in performance of ADLs  - Assess for home care needs following discharge   - Consider OT consult to assist with ADL evaluation and planning for discharge  - Provide patient education as appropriate  Outcome: Progressing  Goal: Maintains/Returns to pre admission functional level  Description: INTERVENTIONS:  - Perform BMAT or MOVE assessment daily    - Set and communicate daily mobility goal to care team and patient/family/caregiver  - Collaborate with rehabilitation services on mobility goals if consulted  - Perform Range of Motion 3 times a day  - Reposition patient every 2 hours    - Dangle patient 3 times a day  - Stand patient 3 times a day  - Ambulate patient 3 times a day  - Out of bed to chair 3 times a day   - Out of bed for meals 3 times a day  - Out of bed for toileting  - Record patient progress and toleration of activity level   Outcome: Progressing  Goal: Patient will remain free of falls  Description: INTERVENTIONS:  - Educate patient/family on patient safety including physical limitations  - Instruct patient to call for assistance with activity   - Consult OT/PT to assist with strengthening/mobility   - Keep Call bell within reach  - Keep bed low and locked with side rails adjusted as appropriate  - Keep care items and personal belongings within reach  - Initiate and maintain comfort rounds  - Make Fall Risk Sign visible to staff  - Offer Toileting every 2 Hours, in advance of need  - Initiate/Maintain bed alarm  - Obtain necessary fall risk management equipment: bed alarm  - Apply yellow socks and bracelet for high fall risk patients  - Consider moving patient to room near nurses station  Outcome: Progressing     Problem: DISCHARGE PLANNING  Goal: Discharge to home or other facility with appropriate resources  Description: INTERVENTIONS:  - Identify barriers to discharge w/patient and caregiver  - Arrange for needed discharge resources and transportation as appropriate  - Identify discharge learning needs (meds, wound care, etc )  - Arrange for interpretive services to assist at discharge as needed  - Refer to Case Management Department for coordinating discharge planning if the patient needs post-hospital services based on physician/advanced practitioner order or complex needs related to functional status, cognitive ability, or social support system  Outcome: Progressing     Problem: Knowledge Deficit  Goal: Patient/family/caregiver demonstrates understanding of disease process, treatment plan, medications, and discharge instructions  Description: Complete learning assessment and assess knowledge base    Interventions:  - Provide teaching at level of understanding  - Provide teaching via preferred learning methods  Outcome: Progressing

## 2022-11-15 NOTE — PROGRESS NOTES
Progress note - Palliative and Supportive Care   Cesar Anguiano 61 y o  male [id number]    Patient Active Problem List   Diagnosis   • Type 2 diabetes mellitus without complication, with long-term current use of insulin (HCC)   • Benign essential hypertension   • Mixed hyperlipidemia   • Erectile dysfunction   • Low testosterone   • Obesity (BMI 30-39  9)   • Testicular hypogonadism   • Incarcerated umbilical hernia   • Left ureteral calculus   • Type 2 diabetes mellitus with hyperlipidemia (HCC)   • Colonic mass   • Microcytic anemia   • Hypokalemia   • Transaminitis   • Thrombocytosis   • Iron deficiency anemia, unspecified   • Malignant neoplasm of transverse colon (HCC)   • 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)   • Colon cancer metastasized to liver [facility])   • Colostomy prolapse (HCC)   • Other fatigue   • Cervical radiculopathy   • Encephalopathy   • Flash pulmonary edema (HCC)   • MR (mitral regurgitation)   • Bacteremia   • ESBL (extended spectrum beta-lactamase) producing bacteria infection   • Acute respiratory failure with hypoxia (HCC)     Active issues specifically addressed today include:     Metastatic colon cancer postop colon resection and liver resection  Cancer associated pain  Palliative care patient      Plan:  1  Symptom management -   Pain  ·  Oxycodone 5 mg-10 mg p r n  moderate to severe pain  · Hydromorphone 0 5 mg IV for breakthrough pain  · Continue Robaxin 500 mg b i d   · Continue acetaminophen 975 mg Q 8 scheduled  · Continue Cymbalta 60 mg daily  · Increased pain associated with gas pains, Bentyl initiated by primary team        2  Goals  · Full cares no limits       Code Status:  Full code - Level 1   Decisional apparatus:  Patient is competent on my exam today  If competence is lost, patient's substitute decision maker would default to spouse [place]     Advance Directive / Living Will / POLST:  Not on file    Interval history:       Patient is out of bed to chair today  He reports adequate pain control when taking oxycodone 10 mg  He reports increased gas pain and pain with having a bowel movement today, which improved after moving his bowels  Primary team has started Bentyl to help with abdominal cramping  Discussed availability of pain medication and importance of pain control; Tona Agosto       MEDICATIONS / ALLERGIES:     current meds:   Current Facility-Administered Medications   Medication Dose Route Frequency   • acetaminophen (TYLENOL) tablet 975 mg  975 mg Oral Q8H [facility]   • amLODIPine (NORVASC) tablet 10 mg  10 mg Oral BID   • atorvastatin (LIPITOR) tablet 40 mg  40 mg Oral Daily With Dinner   • dicyclomine (BENTYL) tablet 10 mg  10 mg Oral TID AC   • DULoxetine (CYMBALTA) delayed release capsule 60 mg  60 mg Oral Daily   • ertapenem (INVanz) 1,000 mg in sodium chloride 0 9 % 50 mL IVPB  1,000 mg Intravenous Q24H   • heparin (porcine) subcutaneous injection 5,000 Units  5,000 Units Subcutaneous Q8H [facility]   • HYDROmorphone (DILAUDID) injection 0 5 mg  0 5 mg Intravenous Q4H PRN   • insulin glargine (LANTUS) subcutaneous injection 15 Units 0 15 mL  15 Units Subcutaneous HS   • insulin lispro (HumaLOG) 100 units/mL subcutaneous injection 1-6 Units  1-6 Units Subcutaneous HS   • insulin lispro (HumaLOG) 100 units/mL subcutaneous injection 2-12 Units  2-12 Units Subcutaneous TID AC   • labetalol (NORMODYNE) injection 10 mg  10 mg Intravenous Q4H PRN   • melatonin tablet 6 mg  6 mg Oral HS   • methocarbamol (ROBAXIN) tablet 500 mg  500 mg Oral BID   • ondansetron (ZOFRAN) injection 4 mg  4 mg Intravenous Q4H PRN   • oxyCODONE (ROXICODONE) IR tablet 5 mg  5 mg Oral Q4H PRN    Or   • oxyCODONE (ROXICODONE) immediate release tablet 10 mg  10 mg Oral Q4H PRN   • pantoprazole (PROTONIX) EC tablet 40 mg  40 mg Oral Early Morning   • phenol (CHLORASEPTIC) 1 4 % mucosal liquid 1 spray  1 spray Mouth/Throat Q2H PRN       Allergies   Allergen Reactions   • Shellfish-Derived Products - Food Allergy Anaphylaxis   • Erythromycin GI Intolerance       OBJECTIVE:    Physical Exam  Physical Exam  Vitals and nursing note reviewed  Constitutional:       General: He is awake  Appearance: He is well-developed  He is ill-appearing  HENT:      Head: Normocephalic and atraumatic  Eyes:      Conjunctiva/sclera: Conjunctivae normal    Cardiovascular:      Rate and Rhythm: Normal rate and regular rhythm  Heart sounds: No murmur heard  Pulmonary:      Effort: Pulmonary effort is normal  No respiratory distress or retractions  Breath sounds: Normal breath sounds  Abdominal:      Palpations: Abdomen is soft  Tenderness: There is no abdominal tenderness  Musculoskeletal:      Cervical back: Neck supple  Skin:     General: Skin is warm and dry  Neurological:      General: No focal deficit present  Mental Status: He is alert and oriented to person, place, and time  GCS: GCS eye subscore is 4  GCS verbal subscore is 5  GCS motor subscore is 6  Psychiatric:         Attention and Perception: Attention normal          Mood and Affect: Mood normal          Behavior: Behavior is cooperative  Cognition and Memory: Cognition and memory normal          Lab Results:   CBC:   Lab Results   Component Value Date    WBC 17 03 (H) 11/15/2022    HGB 7 4 (L) 11/15/2022    HCT 22 6 (L) 11/15/2022    MCV 93 11/15/2022     11/15/2022    MCH 30 3 11/15/2022    MCHC 32 7 11/15/2022    RDW 14 6 11/15/2022    MPV 10 6 11/15/2022   , CMP:   Lab Results   Component Value Date    SODIUM 143 11/15/2022    K 3 7 11/15/2022     (H) 11/15/2022    CO2 26 11/15/2022    BUN 31 (H) 11/15/2022    CREATININE 1 30 11/15/2022    CALCIUM 8 1 (L) 11/15/2022    EGFR 60 11/15/2022   , PT/PTT:No results found for: PT, PTT      Counseling / Coordination of Care    Total floor / unit time spent today 25+ minutes   Greater than 50% of total time was spent with the patient and / or family counseling and / or coordination of care   A description of the counseling / coordination of care: review of symptoms, review of painmedication, suportive listening offered information

## 2022-11-16 ENCOUNTER — APPOINTMENT (INPATIENT)
Dept: RADIOLOGY | Facility: HOSPITAL | Age: 57
End: 2022-11-16

## 2022-11-16 ENCOUNTER — ANESTHESIA (INPATIENT)
Dept: PERIOP | Facility: HOSPITAL | Age: 57
End: 2022-11-16

## 2022-11-16 ENCOUNTER — ANESTHESIA EVENT (INPATIENT)
Dept: PERIOP | Facility: HOSPITAL | Age: 57
End: 2022-11-16

## 2022-11-16 ENCOUNTER — APPOINTMENT (OUTPATIENT)
Dept: RADIOLOGY | Facility: HOSPITAL | Age: 57
End: 2022-11-16

## 2022-11-16 LAB
ABO GROUP BLD: NORMAL
ACANTHOCYTES BLD QL SMEAR: PRESENT
ALBUMIN SERPL BCP-MCNC: 1.4 G/DL (ref 3.5–5)
ALP SERPL-CCNC: 220 U/L (ref 46–116)
ALT SERPL W P-5'-P-CCNC: 23 U/L (ref 12–78)
ANION GAP SERPL CALCULATED.3IONS-SCNC: 6 MMOL/L (ref 4–13)
ANION GAP SERPL CALCULATED.3IONS-SCNC: 8 MMOL/L (ref 4–13)
ANISOCYTOSIS BLD QL SMEAR: PRESENT
AST SERPL W P-5'-P-CCNC: 40 U/L (ref 5–45)
ATRIAL RATE: 99 BPM
BASE EX.OXY STD BLDV CALC-SCNC: 69.6 % (ref 60–80)
BASE EXCESS BLDA CALC-SCNC: -3.1 MMOL/L
BASE EXCESS BLDA CALC-SCNC: 1 MMOL/L (ref -2–3)
BASE EXCESS BLDV CALC-SCNC: 0.9 MMOL/L
BASOPHILS # BLD AUTO: 0.03 THOUSANDS/ÂΜL (ref 0–0.1)
BASOPHILS # BLD AUTO: 0.07 THOUSANDS/ÂΜL (ref 0–0.1)
BASOPHILS # BLD MANUAL: 0 THOUSAND/UL (ref 0–0.1)
BASOPHILS NFR BLD AUTO: 0 % (ref 0–1)
BASOPHILS NFR BLD AUTO: 0 % (ref 0–1)
BASOPHILS NFR MAR MANUAL: 0 % (ref 0–1)
BILIRUB SERPL-MCNC: 1.53 MG/DL (ref 0.2–1)
BLD GP AB SCN SERPL QL: NEGATIVE
BODY TEMPERATURE: 98.9 DEGREES FEHRENHEIT
BUN SERPL-MCNC: 29 MG/DL (ref 5–25)
BUN SERPL-MCNC: 31 MG/DL (ref 5–25)
CA-I BLD-SCNC: 1.02 MMOL/L (ref 1.12–1.32)
CA-I BLD-SCNC: 1.09 MMOL/L (ref 1.12–1.32)
CALCIUM ALBUM COR SERPL-MCNC: 9.6 MG/DL (ref 8.3–10.1)
CALCIUM SERPL-MCNC: 7.5 MG/DL (ref 8.3–10.1)
CALCIUM SERPL-MCNC: 8.3 MG/DL (ref 8.3–10.1)
CFFFLEV: 823 MG/DL (ref 278–581)
CFFMA (FUNCTIONAL FIBRINOGEN MAX AMPLITUDE): 45.1 MM (ref 15–32)
CHLORIDE SERPL-SCNC: 109 MMOL/L (ref 96–108)
CHLORIDE SERPL-SCNC: 113 MMOL/L (ref 96–108)
CKA(ANGLE): 80.1 DEG (ref 63–78)
CKHR(HEPARINASE REACTION TIME): 5.4 MIN (ref 4.3–8.3)
CKK(CLOT KINETICS): 0.8 MIN (ref 0.8–2.1)
CKMA(MAX AMPLITUDE): 71.2 MM (ref 52–69)
CKR(REACTION TIME): 5 MIN (ref 4.6–9.1)
CO2 SERPL-SCNC: 22 MMOL/L (ref 21–32)
CO2 SERPL-SCNC: 25 MMOL/L (ref 21–32)
CREAT SERPL-MCNC: 1.25 MG/DL (ref 0.6–1.3)
CREAT SERPL-MCNC: 1.33 MG/DL (ref 0.6–1.3)
CRTMA(RAPIDTEG MAX AMPLITUDE): 71.2 MM (ref 52–70)
EOSINOPHIL # BLD AUTO: 0 THOUSAND/ÂΜL (ref 0–0.61)
EOSINOPHIL # BLD AUTO: 0.31 THOUSAND/ÂΜL (ref 0–0.61)
EOSINOPHIL # BLD MANUAL: 0.52 THOUSAND/UL (ref 0–0.4)
EOSINOPHIL NFR BLD AUTO: 0 % (ref 0–6)
EOSINOPHIL NFR BLD AUTO: 2 % (ref 0–6)
EOSINOPHIL NFR BLD MANUAL: 2 % (ref 0–6)
ERYTHROCYTE [DISTWIDTH] IN BLOOD BY AUTOMATED COUNT: 14.9 % (ref 11.6–15.1)
ERYTHROCYTE [DISTWIDTH] IN BLOOD BY AUTOMATED COUNT: 15.2 % (ref 11.6–15.1)
ERYTHROCYTE [DISTWIDTH] IN BLOOD BY AUTOMATED COUNT: 16.1 % (ref 11.6–15.1)
GFR SERPL CREATININE-BSD FRML MDRD: 58 ML/MIN/1.73SQ M
GFR SERPL CREATININE-BSD FRML MDRD: 63 ML/MIN/1.73SQ M
GIANT PLATELETS BLD QL SMEAR: PRESENT
GLUCOSE SERPL-MCNC: 141 MG/DL (ref 65–140)
GLUCOSE SERPL-MCNC: 151 MG/DL (ref 65–140)
GLUCOSE SERPL-MCNC: 165 MG/DL (ref 65–140)
GLUCOSE SERPL-MCNC: 166 MG/DL (ref 65–140)
GLUCOSE SERPL-MCNC: 172 MG/DL (ref 65–140)
GLUCOSE SERPL-MCNC: 177 MG/DL (ref 65–140)
GLUCOSE SERPL-MCNC: 185 MG/DL (ref 65–140)
HCO3 BLDA-SCNC: 22.1 MMOL/L (ref 22–28)
HCO3 BLDA-SCNC: 25.5 MMOL/L (ref 22–28)
HCO3 BLDV-SCNC: 25.4 MMOL/L (ref 24–30)
HCT VFR BLD AUTO: 22.5 % (ref 36.5–49.3)
HCT VFR BLD AUTO: 31 % (ref 36.5–49.3)
HCT VFR BLD AUTO: 33.9 % (ref 36.5–49.3)
HCT VFR BLD CALC: 25 % (ref 36.5–49.3)
HGB BLD-MCNC: 10.1 G/DL (ref 12–17)
HGB BLD-MCNC: 10.7 G/DL (ref 12–17)
HGB BLD-MCNC: 7.3 G/DL (ref 12–17)
HGB BLDA-MCNC: 8.5 G/DL (ref 12–17)
HOROWITZ INDEX BLDA+IHG-RTO: 100 MM[HG]
IMM GRANULOCYTES # BLD AUTO: 0.42 THOUSAND/UL (ref 0–0.2)
IMM GRANULOCYTES # BLD AUTO: >0.5 THOUSAND/UL (ref 0–0.2)
IMM GRANULOCYTES NFR BLD AUTO: 2 % (ref 0–2)
IMM GRANULOCYTES NFR BLD AUTO: 2 % (ref 0–2)
LACTATE SERPL-SCNC: 1.4 MMOL/L (ref 0.5–2)
LACTATE SERPL-SCNC: 1.6 MMOL/L (ref 0.5–2)
LYMPHOCYTES # BLD AUTO: 0.52 THOUSAND/UL (ref 0.6–4.47)
LYMPHOCYTES # BLD AUTO: 1.2 THOUSANDS/ÂΜL (ref 0.6–4.47)
LYMPHOCYTES # BLD AUTO: 1.53 THOUSANDS/ÂΜL (ref 0.6–4.47)
LYMPHOCYTES # BLD AUTO: 2 % (ref 14–44)
LYMPHOCYTES NFR BLD AUTO: 4 % (ref 14–44)
LYMPHOCYTES NFR BLD AUTO: 7 % (ref 14–44)
MAGNESIUM SERPL-MCNC: 2.2 MG/DL (ref 1.6–2.6)
MCH RBC QN AUTO: 29.7 PG (ref 26.8–34.3)
MCH RBC QN AUTO: 29.8 PG (ref 26.8–34.3)
MCH RBC QN AUTO: 30.2 PG (ref 26.8–34.3)
MCHC RBC AUTO-ENTMCNC: 31.6 G/DL (ref 31.4–37.4)
MCHC RBC AUTO-ENTMCNC: 32.4 G/DL (ref 31.4–37.4)
MCHC RBC AUTO-ENTMCNC: 32.6 G/DL (ref 31.4–37.4)
MCV RBC AUTO: 91 FL (ref 82–98)
MCV RBC AUTO: 93 FL (ref 82–98)
MCV RBC AUTO: 94 FL (ref 82–98)
MONOCYTES # BLD AUTO: 0 THOUSAND/UL (ref 0–1.22)
MONOCYTES # BLD AUTO: 1.32 THOUSAND/ÂΜL (ref 0.17–1.22)
MONOCYTES # BLD AUTO: 1.88 THOUSAND/ÂΜL (ref 0.17–1.22)
MONOCYTES NFR BLD AUTO: 6 % (ref 4–12)
MONOCYTES NFR BLD AUTO: 6 % (ref 4–12)
MONOCYTES NFR BLD: 0 % (ref 4–12)
MYELOCYTES NFR BLD MANUAL: 1 % (ref 0–1)
NASAL CANNULA: 3
NEUTROPHILS # BLD AUTO: 17.46 THOUSANDS/ÂΜL (ref 1.85–7.62)
NEUTROPHILS # BLD AUTO: 25.84 THOUSANDS/ÂΜL (ref 1.85–7.62)
NEUTROPHILS # BLD MANUAL: 24.55 THOUSAND/UL (ref 1.85–7.62)
NEUTS BAND NFR BLD MANUAL: 9 % (ref 0–8)
NEUTS SEG NFR BLD AUTO: 83 % (ref 43–75)
NEUTS SEG NFR BLD AUTO: 86 % (ref 43–75)
NEUTS SEG NFR BLD AUTO: 88 % (ref 43–75)
NRBC BLD AUTO-RTO: 0 /100 WBCS
NRBC BLD AUTO-RTO: 0 /100 WBCS
O2 CT BLDA-SCNC: 15.6 ML/DL (ref 16–23)
O2 CT BLDV-SCNC: 7.9 ML/DL
OXYHGB MFR BLDA: 96.9 % (ref 94–97)
P AXIS: 58 DEGREES
PCO2 BLD: 27 MMOL/L (ref 21–32)
PCO2 BLD: 40.5 MM HG (ref 42–50)
PCO2 BLD: 41.2 MM HG (ref 36–44)
PCO2 BLDA: 40.2 MM HG (ref 36–44)
PCO2 BLDV: 40.1 MM HG (ref 42–50)
PEEP RESPIRATORY: 6 CM[H2O]
PH BLD: 7.4 [PH] (ref 7.35–7.45)
PH BLD: 7.42 [PH] (ref 7.3–7.4)
PH BLDA: 7.36 [PH] (ref 7.35–7.45)
PH BLDV: 7.42 [PH] (ref 7.3–7.4)
PHOSPHATE SERPL-MCNC: 5 MG/DL (ref 2.7–4.5)
PLATELET # BLD AUTO: 340 THOUSANDS/UL (ref 149–390)
PLATELET # BLD AUTO: 452 THOUSANDS/UL (ref 149–390)
PLATELET # BLD AUTO: 454 THOUSANDS/UL (ref 149–390)
PLATELET BLD QL SMEAR: ABNORMAL
PMV BLD AUTO: 10.3 FL (ref 8.9–12.7)
PMV BLD AUTO: 10.4 FL (ref 8.9–12.7)
PMV BLD AUTO: 10.6 FL (ref 8.9–12.7)
PO2 BLD: 139 MM HG (ref 75–129)
PO2 BLDA: 130.4 MM HG (ref 75–129)
PO2 BLDV: 39 MM HG (ref 35–45)
PO2 VENOUS TEMP CORRECTED: 39.6 MM HG (ref 35–45)
POIKILOCYTOSIS BLD QL SMEAR: PRESENT
POLYCHROMASIA BLD QL SMEAR: PRESENT
POTASSIUM BLD-SCNC: 4.1 MMOL/L (ref 3.5–5.3)
POTASSIUM SERPL-SCNC: 3.9 MMOL/L (ref 3.5–5.3)
POTASSIUM SERPL-SCNC: 4.2 MMOL/L (ref 3.5–5.3)
PR INTERVAL: 160 MS
PROT SERPL-MCNC: 4.9 G/DL (ref 6.4–8.4)
QRS AXIS: 134 DEGREES
QRSD INTERVAL: 138 MS
QT INTERVAL: 386 MS
QTC INTERVAL: 495 MS
RBC # BLD AUTO: 2.42 MILLION/UL (ref 3.88–5.62)
RBC # BLD AUTO: 3.4 MILLION/UL (ref 3.88–5.62)
RBC # BLD AUTO: 3.59 MILLION/UL (ref 3.88–5.62)
RBC MORPH BLD: PRESENT
RH BLD: POSITIVE
SAO2 % BLD FROM PO2: 99 % (ref 60–85)
SODIUM BLD-SCNC: 141 MMOL/L (ref 136–145)
SODIUM SERPL-SCNC: 140 MMOL/L (ref 135–147)
SODIUM SERPL-SCNC: 143 MMOL/L (ref 135–147)
SPECIMEN EXPIRATION DATE: NORMAL
SPECIMEN SOURCE: ABNORMAL
SPECIMEN SOURCE: ABNORMAL
T WAVE AXIS: 10 DEGREES
VENT AC: 14
VENT- AC: AC
VENTRICULAR RATE: 99 BPM
VT SETTING VENT: 400 ML
WBC # BLD AUTO: 21.07 THOUSAND/UL (ref 4.31–10.16)
WBC # BLD AUTO: 25.84 THOUSAND/UL (ref 4.31–10.16)
WBC # BLD AUTO: 29.65 THOUSAND/UL (ref 4.31–10.16)

## 2022-11-16 PROCEDURE — 0DBF0ZZ EXCISION OF RIGHT LARGE INTESTINE, OPEN APPROACH: ICD-10-PCS | Performed by: STUDENT IN AN ORGANIZED HEALTH CARE EDUCATION/TRAINING PROGRAM

## 2022-11-16 PROCEDURE — 0WCG0ZZ EXTIRPATION OF MATTER FROM PERITONEAL CAVITY, OPEN APPROACH: ICD-10-PCS | Performed by: STUDENT IN AN ORGANIZED HEALTH CARE EDUCATION/TRAINING PROGRAM

## 2022-11-16 PROCEDURE — 5A1945Z RESPIRATORY VENTILATION, 24-96 CONSECUTIVE HOURS: ICD-10-PCS | Performed by: STUDENT IN AN ORGANIZED HEALTH CARE EDUCATION/TRAINING PROGRAM

## 2022-11-16 RX ORDER — ROCURONIUM BROMIDE 10 MG/ML
INJECTION, SOLUTION INTRAVENOUS AS NEEDED
Status: DISCONTINUED | OUTPATIENT
Start: 2022-11-16 | End: 2022-11-16

## 2022-11-16 RX ORDER — HYDROMORPHONE HCL/PF 1 MG/ML
SYRINGE (ML) INJECTION AS NEEDED
Status: DISCONTINUED | OUTPATIENT
Start: 2022-11-16 | End: 2022-11-16

## 2022-11-16 RX ORDER — PANTOPRAZOLE SODIUM 40 MG/10ML
40 INJECTION, POWDER, LYOPHILIZED, FOR SOLUTION INTRAVENOUS
Status: DISCONTINUED | OUTPATIENT
Start: 2022-11-16 | End: 2022-11-22

## 2022-11-16 RX ORDER — CALCIUM GLUCONATE 20 MG/ML
2 INJECTION, SOLUTION INTRAVENOUS ONCE
Status: COMPLETED | OUTPATIENT
Start: 2022-11-16 | End: 2022-11-16

## 2022-11-16 RX ORDER — FENTANYL CITRATE 50 UG/ML
INJECTION, SOLUTION INTRAMUSCULAR; INTRAVENOUS AS NEEDED
Status: DISCONTINUED | OUTPATIENT
Start: 2022-11-16 | End: 2022-11-16

## 2022-11-16 RX ORDER — SODIUM CHLORIDE, SODIUM GLUCONATE, SODIUM ACETATE, POTASSIUM CHLORIDE, MAGNESIUM CHLORIDE, SODIUM PHOSPHATE, DIBASIC, AND POTASSIUM PHOSPHATE .53; .5; .37; .037; .03; .012; .00082 G/100ML; G/100ML; G/100ML; G/100ML; G/100ML; G/100ML; G/100ML
75 INJECTION, SOLUTION INTRAVENOUS CONTINUOUS
Status: DISCONTINUED | OUTPATIENT
Start: 2022-11-16 | End: 2022-11-16

## 2022-11-16 RX ORDER — PROPOFOL 10 MG/ML
5-50 INJECTION, EMULSION INTRAVENOUS
Status: DISCONTINUED | OUTPATIENT
Start: 2022-11-16 | End: 2022-11-19

## 2022-11-16 RX ORDER — INSULIN LISPRO 100 [IU]/ML
2-12 INJECTION, SOLUTION INTRAVENOUS; SUBCUTANEOUS EVERY 6 HOURS SCHEDULED
Status: DISCONTINUED | OUTPATIENT
Start: 2022-11-16 | End: 2022-11-24

## 2022-11-16 RX ORDER — CEFAZOLIN SODIUM 2 G/50ML
2000 SOLUTION INTRAVENOUS
Status: COMPLETED | OUTPATIENT
Start: 2022-11-16 | End: 2022-11-16

## 2022-11-16 RX ORDER — PROPOFOL 10 MG/ML
INJECTION, EMULSION INTRAVENOUS CONTINUOUS PRN
Status: DISCONTINUED | OUTPATIENT
Start: 2022-11-16 | End: 2022-11-16

## 2022-11-16 RX ORDER — ALBUMIN, HUMAN INJ 5% 5 %
25 SOLUTION INTRAVENOUS ONCE
Status: COMPLETED | OUTPATIENT
Start: 2022-11-16 | End: 2022-11-17

## 2022-11-16 RX ORDER — METRONIDAZOLE 500 MG/100ML
500 INJECTION, SOLUTION INTRAVENOUS
Status: COMPLETED | OUTPATIENT
Start: 2022-11-16 | End: 2022-11-16

## 2022-11-16 RX ORDER — FENTANYL CITRATE 50 UG/ML
50 INJECTION, SOLUTION INTRAMUSCULAR; INTRAVENOUS
Status: DISCONTINUED | OUTPATIENT
Start: 2022-11-16 | End: 2022-11-19

## 2022-11-16 RX ORDER — LIDOCAINE HYDROCHLORIDE 10 MG/ML
INJECTION, SOLUTION EPIDURAL; INFILTRATION; INTRACAUDAL; PERINEURAL AS NEEDED
Status: DISCONTINUED | OUTPATIENT
Start: 2022-11-16 | End: 2022-11-16

## 2022-11-16 RX ORDER — ALBUMIN, HUMAN INJ 5% 5 %
SOLUTION INTRAVENOUS CONTINUOUS PRN
Status: DISCONTINUED | OUTPATIENT
Start: 2022-11-16 | End: 2022-11-16

## 2022-11-16 RX ORDER — SODIUM CHLORIDE, SODIUM LACTATE, POTASSIUM CHLORIDE, CALCIUM CHLORIDE 600; 310; 30; 20 MG/100ML; MG/100ML; MG/100ML; MG/100ML
INJECTION, SOLUTION INTRAVENOUS CONTINUOUS PRN
Status: DISCONTINUED | OUTPATIENT
Start: 2022-11-16 | End: 2022-11-16

## 2022-11-16 RX ORDER — PROPOFOL 10 MG/ML
INJECTION, EMULSION INTRAVENOUS AS NEEDED
Status: DISCONTINUED | OUTPATIENT
Start: 2022-11-16 | End: 2022-11-16

## 2022-11-16 RX ORDER — ACETAMINOPHEN 650 MG/1
650 SUPPOSITORY RECTAL EVERY 4 HOURS PRN
Status: DISCONTINUED | OUTPATIENT
Start: 2022-11-16 | End: 2022-11-21

## 2022-11-16 RX ORDER — SODIUM CHLORIDE 9 MG/ML
INJECTION, SOLUTION INTRAVENOUS CONTINUOUS PRN
Status: DISCONTINUED | OUTPATIENT
Start: 2022-11-16 | End: 2022-11-16

## 2022-11-16 RX ORDER — CHLORHEXIDINE GLUCONATE 0.12 MG/ML
15 RINSE ORAL EVERY 12 HOURS SCHEDULED
Status: DISCONTINUED | OUTPATIENT
Start: 2022-11-16 | End: 2022-12-03 | Stop reason: HOSPADM

## 2022-11-16 RX ORDER — ALBUMIN (HUMAN) 12.5 G/50ML
25 SOLUTION INTRAVENOUS ONCE
Status: COMPLETED | OUTPATIENT
Start: 2022-11-16 | End: 2022-11-16

## 2022-11-16 RX ORDER — FENTANYL CITRATE-0.9 % NACL/PF 10 MCG/ML
50 PLASTIC BAG, INJECTION (ML) INTRAVENOUS CONTINUOUS
Status: DISCONTINUED | OUTPATIENT
Start: 2022-11-16 | End: 2022-11-21

## 2022-11-16 RX ADMIN — SODIUM CHLORIDE, SODIUM GLUCONATE, SODIUM ACETATE, POTASSIUM CHLORIDE AND MAGNESIUM CHLORIDE 125 ML/HR: 526; 502; 368; 37; 30 INJECTION, SOLUTION INTRAVENOUS at 15:48

## 2022-11-16 RX ADMIN — ERTAPENEM SODIUM 1000 MG: 1 INJECTION, POWDER, LYOPHILIZED, FOR SOLUTION INTRAMUSCULAR; INTRAVENOUS at 19:00

## 2022-11-16 RX ADMIN — OXYCODONE HYDROCHLORIDE 5 MG: 5 TABLET ORAL at 09:09

## 2022-11-16 RX ADMIN — INSULIN LISPRO 2 UNITS: 100 INJECTION, SOLUTION INTRAVENOUS; SUBCUTANEOUS at 07:30

## 2022-11-16 RX ADMIN — HYDROMORPHONE HYDROCHLORIDE 0.5 MG: 1 INJECTION, SOLUTION INTRAMUSCULAR; INTRAVENOUS; SUBCUTANEOUS at 04:30

## 2022-11-16 RX ADMIN — Medication 50 MCG/HR: at 16:23

## 2022-11-16 RX ADMIN — PROPOFOL 40 MCG/KG/MIN: 10 INJECTION, EMULSION INTRAVENOUS at 17:09

## 2022-11-16 RX ADMIN — SODIUM CHLORIDE, SODIUM LACTATE, POTASSIUM CHLORIDE, AND CALCIUM CHLORIDE: .6; .31; .03; .02 INJECTION, SOLUTION INTRAVENOUS at 13:42

## 2022-11-16 RX ADMIN — ALBUMIN (HUMAN): 12.5 INJECTION, SOLUTION INTRAVENOUS at 13:44

## 2022-11-16 RX ADMIN — LIDOCAINE HYDROCHLORIDE 50 MG: 10 INJECTION, SOLUTION EPIDURAL; INFILTRATION; INTRACAUDAL; PERINEURAL at 12:40

## 2022-11-16 RX ADMIN — CALCIUM GLUCONATE 2 G: 20 INJECTION, SOLUTION INTRAVENOUS at 17:22

## 2022-11-16 RX ADMIN — PROPOFOL 120 MG: 10 INJECTION, EMULSION INTRAVENOUS at 12:40

## 2022-11-16 RX ADMIN — CEFAZOLIN SODIUM 2000 MG: 2 SOLUTION INTRAVENOUS at 12:28

## 2022-11-16 RX ADMIN — ACETAMINOPHEN 975 MG: 325 TABLET, FILM COATED ORAL at 05:15

## 2022-11-16 RX ADMIN — OXYCODONE HYDROCHLORIDE 10 MG: 10 TABLET ORAL at 04:10

## 2022-11-16 RX ADMIN — SODIUM CHLORIDE, SODIUM LACTATE, POTASSIUM CHLORIDE, AND CALCIUM CHLORIDE: .6; .31; .03; .02 INJECTION, SOLUTION INTRAVENOUS at 12:23

## 2022-11-16 RX ADMIN — ROCURONIUM BROMIDE 50 MG: 50 INJECTION INTRAVENOUS at 12:40

## 2022-11-16 RX ADMIN — PANTOPRAZOLE SODIUM 40 MG: 40 INJECTION, POWDER, FOR SOLUTION INTRAVENOUS at 17:11

## 2022-11-16 RX ADMIN — PROPOFOL 50 MCG/KG/MIN: 10 INJECTION, EMULSION INTRAVENOUS at 14:54

## 2022-11-16 RX ADMIN — AMLODIPINE BESYLATE 10 MG: 10 TABLET ORAL at 09:09

## 2022-11-16 RX ADMIN — CHLORHEXIDINE GLUCONATE 15 ML: 1.2 SOLUTION ORAL at 20:28

## 2022-11-16 RX ADMIN — INSULIN LISPRO 2 UNITS: 100 INJECTION, SOLUTION INTRAVENOUS; SUBCUTANEOUS at 23:49

## 2022-11-16 RX ADMIN — SODIUM CHLORIDE: 0.9 INJECTION, SOLUTION INTRAVENOUS at 13:00

## 2022-11-16 RX ADMIN — FENTANYL CITRATE 50 MCG: 50 INJECTION INTRAMUSCULAR; INTRAVENOUS at 12:40

## 2022-11-16 RX ADMIN — PROPOFOL 45 MCG/KG/MIN: 10 INJECTION, EMULSION INTRAVENOUS at 16:23

## 2022-11-16 RX ADMIN — HEPARIN SODIUM 5000 UNITS: 5000 INJECTION INTRAVENOUS; SUBCUTANEOUS at 05:15

## 2022-11-16 RX ADMIN — HYDROMORPHONE HYDROCHLORIDE 1 MG: 1 INJECTION, SOLUTION INTRAMUSCULAR; INTRAVENOUS; SUBCUTANEOUS at 13:54

## 2022-11-16 RX ADMIN — HEPARIN SODIUM 5000 UNITS: 5000 INJECTION INTRAVENOUS; SUBCUTANEOUS at 21:08

## 2022-11-16 RX ADMIN — INSULIN LISPRO 2 UNITS: 100 INJECTION, SOLUTION INTRAVENOUS; SUBCUTANEOUS at 11:15

## 2022-11-16 RX ADMIN — FENTANYL CITRATE 50 MCG: 50 INJECTION INTRAMUSCULAR; INTRAVENOUS at 13:12

## 2022-11-16 RX ADMIN — METRONIDAZOLE: 500 SOLUTION INTRAVENOUS at 13:00

## 2022-11-16 RX ADMIN — METHOCARBAMOL 500 MG: 500 TABLET ORAL at 09:09

## 2022-11-16 RX ADMIN — PROPOFOL 40 MCG/KG/MIN: 10 INJECTION, EMULSION INTRAVENOUS at 20:28

## 2022-11-16 RX ADMIN — DULOXETINE HYDROCHLORIDE 60 MG: 60 CAPSULE, DELAYED RELEASE ORAL at 09:09

## 2022-11-16 RX ADMIN — IOHEXOL 100 ML: 350 INJECTION, SOLUTION INTRAVENOUS at 08:54

## 2022-11-16 RX ADMIN — ALBUMIN (HUMAN) 25 G: 0.25 INJECTION, SOLUTION INTRAVENOUS at 23:31

## 2022-11-16 RX ADMIN — ROCURONIUM BROMIDE 30 MG: 50 INJECTION INTRAVENOUS at 13:53

## 2022-11-16 RX ADMIN — PANTOPRAZOLE SODIUM 40 MG: 40 TABLET, DELAYED RELEASE ORAL at 05:15

## 2022-11-16 NOTE — PROGRESS NOTES
Spiritual Care Progress Note    2022  Patient: Lauryn Young : 1965  Admission Date & Time: 2022 0515  MRN: [id number] CSN: [id number]      [facility] visited with pt and wife while doing rounds on the unit  Pt was sitting comfortably in his chair but in a fair amount of pain and desiring to go back into bed to be more comfortable  , pt, and wife briefly chatted about family and children and their children's hopes and dreams for the future  Pt is very proud of his children (as he should be, son desires to be  and daughter may follow parents' footsteps and become an actress) and brightens when speaking about them  Pt's wife was also an actress and the pt also brightens when speaking about their life together  Pt seemed to be in much better spirits during this visit than in previous visits   appreciated the visit and an opportunity to learn more about the pt and his family   provided an empathetic ear and prayer for strength and courage to continue on the path God has laid out before the pt   remains available                 Chaplaincy Interventions Utilized:   Empowerment: Clarified, confirmed, or reviewed information from treatment team , Normalized experience of patient/family, and Provided chaplaincy education    Exploration: Explored alternatives, Explored hope, Explored emotional needs & resources, Explored relational needs & resources, Explored spiritual needs & resources, and Facilitated story telling    Collaboration: Facilitated respect for spiritual/cultural practice during hospitalization    Relationship Building: Cultivated a relationship of care and support and Listened empathically    Ritual: Provided prayer    Chaplaincy Outcomes Achieved:  Expressed gratitude, Expressed humor, Expressed peace, Progressed toward acceptance, Relational resources utilized, and Spiritual resources utilized    Spiritual Coping Strategies Utilized: Spiritual comfort and Spiritual meaning       11/15/22 0548   Clinical Encounter Type   Visited With Patient and family together   Routine Visit Follow-up   Confucianism Encounters   Confucianism Needs Prayer   Patient Spiritual Encounters   Spiritual Assessment 4   Suffering Severity 4   Fear Level 4   Coping 5   Social Interaction 100% of the time   Family Spiritual Encounters   Family Coping Open/discussion; Accepting   Family Normalization 5   Family Participation in Care 5   Family Support During Treatment 5   Caregiver-Patient Relationship 5

## 2022-11-16 NOTE — OP NOTE
OPERATIVE REPORT  PATIENT NAME: Demetrio Zamora    :  1965  MRN: [id number]  Pt Location: [facility] OR ROOM 06    SURGERY DATE: 2022    Surgeon(s) and Role:     * Paddy Camarena MD - Primary     * Miguel Ángel Post MD - Assisting    Preop Diagnosis:  Malignant neoplasm of transverse colon [facility]) [C18 4]  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 liver ([facility] [address] ) [C79 9, C22 8]    Post-Op Diagnosis Codes:     * Malignant neoplasm of transverse colon ([facility] [address] ) [C18 4]     * 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 liver ([facility] [address] ) [C79 9, C22 8]    Procedure(s) (LRB):  LAPAROTOMY EXPLORATORY W/ BOWEL RESECTION- VAC PLACEMENT (N/A)    Specimen(s):  ID Type Source Tests Collected by Time Destination   1 : OMENTUM Tissue Omentum TISSUE EXAM Paddy Camarena MD 2022 1404    2 : RIGHT COLON Tissue Large Intestine, Right/Ascending Colon TISSUE EXAM Paddy Camarena MD 2022 1420    A : liver abcess  Tissue Liver ANAEROBIC CULTURE AND GRAM STAIN, CULTURE, TISSUE AND GRAM STAIN Paddy Camarena MD 2022 1314    B : LIVER ABCESS  Tissue Liver ANAEROBIC CULTURE AND GRAM STAIN, CULTURE, TISSUE AND GRAM STAIN Paddy Camarena MD 2022 1315        Estimated Blood Loss:   400 mL    Drains:  Closed/Suction Drain Left Abdomen Bulb 19 Fr  (Active)   Number of days: 0       Closed/Suction Drain Lateral;Right Abdomen (Active)   Number of days: 0       Urethral Catheter 16 Fr   (Active)   Amt returned on insertion(mL) 525 mL 11/15/22 1101   Reasons to continue Urinary Catheter  Acute urinary retention/obstruction failing urinary retention protocol 22 0400   Goal for Removal Voiding trial when ambulation improves 22 0400   Site Assessment Clean;Skin intact 22 0400   Negro Care Done 22 0900   Collection Container Standard drainage bag 22 0400   Securement Method Securing device (Describe) 22 0400   Securing Device Change Date 11/22/22 11/15/22 1101   Output (mL) 175 mL 22 0901   Number of days: 1       Anesthesia Type:   General    Operative Indications:  Malignant neoplasm of transverse colon ([address] ) [C18 4]  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) [C79 9, C22 8]    The patient is status post transverse colectomy and resection of the left lateral segment liver met  The patient developed a left upper quadrant hematoma and has a were had a worsening clinical status  On repeat imaging today there was suggestion of a leak from the transverse colon anastomosis  He was taken back to the operating room for exploration    Operative Findings:  Left upper quadrant infected hematoma  Defect in the transverse colon anastomosis with extravasation of succus  Massively dilated cecum requiring resection  Complications:   None    Procedure and Technique:  The patient was identified and general endotracheal intubation achieved  Lines were placed by see anesthesia  Arms were padded  The abdomen was prepped and draped in the usual sterile fashion  Time-out was performed  Staples were removed and the incision was opened along its length and extended superiorly and inferiorly to allow for ease of visualization  Murky dark blood was evacuated from the abdomen in all 4 quadrants  Exploration was initiated at the liver bed  There appeared to be murky appearing fluid mixed with Surgiflo material within the cavity of the liver  This appeared hemostatic  We then continued our exploration  in the left upper quadrant at the site of hematoma  Within the retroperitoneal and omental tissues where dense adhesive tissue was located, the site of the hematoma was encountered  Large clumps of hematoma tissue were removed and sent for culture  As much of the clot as was possible was evacuated from the hematoma  Just medial to the hematoma site, the dilated transverse colon was noted  Extravasation of air and succus was noted at the site of the anastomosis,   There did not appear to be any succus staining the mesentery or omentum in this region  Dr Artur Chaney with acute care surgery scrubbed into the case to assist  We continued exploring to the hepatic flexure, taking down fully the hepatocolic ligament and mobilizing the cecum from its retroperitoneal attachments  There was dense inflammatory tissue in this region and care was taken to protect the ureter  Once the cecum was mobilized into the field, adhesive tissue between the terminal ileum and right lower quadrant was divided carefully  This allowed full medialization of the right colon  A healthy duodenum was preserved in the retroperitoneum  At this juncture, the right colon was noted to be significantly dilated with dusky appearance of the serosa of the cecum  The decision was made to resect the right colon as well as the anastomosis given the defect with active extravasation of succus that did not appear salvagable  Using the linear stapler, the colon was divided just distal to the anastomosis and at the terminal ileum and the mesentery was divided with the EnSeal device  The specimen was passed off the field  We continued our exploration of the abdomen fully  There was dense inflammatory tissue throughout all four quadrants  The sigmoid colon appeared somewhat dilated but the remaining abdominal structures appeared healthy  The abdomen was copiously irrigated with 3 L of saline  Two drains were brought through a left upper quadrant and right upper quadrant stab incision to terminate in the left splenic flexure gutter and the right gutter  An ABThera VAC was placed for repeat exploration in 24-48 hours given the amount of infected tissue and dilated appearance of the remaining colon  The patient tolerated the procedure well and was transported to the ICU in stable condition       I was present for the entire procedure    Patient Disposition:  PACU       SIGNATURE: Rishi Mendez MD  DATE: November 16, 2022  TIME: 3:21 PM

## 2022-11-16 NOTE — OCCUPATIONAL THERAPY NOTE
Occupational Therapy Treatment Note      Sherle Agent    11/16/2022    Principal Problem:    Colon cancer metastasized to liver [facility])  Active Problems:    Benign essential hypertension    Type 2 diabetes mellitus with hyperlipidemia (HCC)    Malignant neoplasm of transverse colon (HCC)    Encephalopathy    Flash pulmonary edema (HCC)    MR (mitral regurgitation)    Bacteremia    ESBL (extended spectrum beta-lactamase) producing bacteria infection    Acute respiratory failure with hypoxia (HCC)      Past Medical History:   Diagnosis Date    Abdominal pain 03/12/2022    Acute renal failure ([place] [address] )     [id number] resolved    Cancer ([place] [address] )     Diabetes mellitus ([place] [address] )     Enteritis 08/23/2016    Gastroparesis due to DM ([place] [address] ) 08/23/2016    GERD (gastroesophageal reflux disease)     Hernia, ventral 08/04/2016    Hyperlipidemia     Hypertension     Morbid obesity ([place] [address] ) 04/17/2018    Postoperative visit 03/02/2022    SIRS (systemic inflammatory response syndrome) ([place] [address] ) 03/12/2022    Snoring     Stage 3a chronic kidney disease ([place] [address] ) 02/19/2022       Past Surgical History:   Procedure Laterality Date    COLONOSCOPY      COLOSTOMY N/A 02/20/2022    Procedure: COLOSTOMY LOOP, diverting;  Surgeon: Nicola Mckeon MD;  Location: [facility];  Service: General    ESOPHAGOGASTRODUODENOSCOPY N/A 08/24/2016    Procedure: ESOPHAGOGASTRODUODENOSCOPY (EGD); Surgeon: Fatou Elias MD;  Location: [facility];   Service:     ILEOSTOMY CLOSURE N/A 11/7/2022    Procedure: REVERSAL COLOSTOMY;  Surgeon: Sapna Parmar MD;  Location: [facility];  Service: Surgical Oncology    IR PORT PLACEMENT  03/10/2022    KIDNEY STONE SURGERY      LIVER BIOPSY LAPAROSCOPIC N/A 02/20/2022    Procedure: DIAGNOSTIC LAPAROSCOPIC LIVER BIOPSY, DIVERTING LOOP COLOSTOMY ;  Surgeon: Nicola Mckeon MD;  [facility]: [facility];  Service: General    LIVER LOBECTOMY N/A 11/7/2022    Procedure: SEGMENT 3 LIVER RESECTION, ABLATION, INTRAOPERATIVE U/S OF LIVER, PERITONEAL BIOPSY;  Surgeon: Wes Madera MD;  Location: [facility];  Service: Surgical Oncology    RIGHT COLON RESECTION N/A 11/7/2022    Procedure: EX LAP, TRANVERSE COLON RESECTION;  Surgeon: Wes Madera MD;  Location: [facility];  Service: Surgical Oncology    TONSILLECTOMY      UMBILICAL HERNIA REPAIR LAPAROSCOPIC N/A 04/26/2019    Procedure: LAPAROSCOPIC UMBILICAL HERNIA REPAIR;  Surgeon: Valerie Moya MD;  Location: [facility];  Service: General        11/16/22 1026   OT Last Visit   OT Visit Date 11/16/22   Note Type   Note Type Treatment   Pain Assessment   Pain Assessment Tool 0-10   Pain Score 3   Pain Location/Orientation Location: Abdomen   Restrictions/Precautions   Weight Bearing Precautions Per Order No   Other Precautions Multiple lines;Telemetry; Fall Risk;Pain;Contact/isolation  (HFNC)   Lifestyle   Autonomy I w/ ADLS/IADLS, transfers and functional mobility PTA   Reciprocal Relationships Pt lives w/ his spouse and kids   Service to Others on SSD   Intrinsic Gratification Does Alexis Leblanc; was [place] in the [facility] for the past 20 years; goes on tour  Bed Mobility   Supine to Sit 4  Minimal assistance   Additional items Assist x 1;HOB elevated; Increased time required;Verbal cues;LE management   Sit to Supine Unable to assess   Additional Comments Pt sat EOB w/ Fair sitting balance/trunk control; O2 dropped to 79% once seated EOB  W/ 1-2 min rest break O2 improved to 91%, remained on HFNC throughout   Transfers   Sit to Stand 3  Moderate assistance   Additional items Assist x 1; Increased time required;Verbal cues   Stand to Sit 3  Moderate assistance   Additional items Assist x 1; Increased time required;Verbal cues   Additional Comments HHA used   Functional Mobility   Functional Mobility 3  Moderate assistance   Additional Comments Pt took few small steps from EOB to chair w/ Mod A x1; HHA used; O2 remained in 90s throughout functional mobility     Additional items Hand hold assistance   Therapeutic Excerise-Strength   UE Strength Yes   Right Upper Extremity- Strength   R Shoulder Flexion; Horizontal ABduction; Extension   R Elbow Elbow flexion;Elbow extension   R Position Seated   Equipment Theraband  (green)   R Weight/Reps/Sets 3 sets of 10   Left Upper Extremity-Strength   L Shoulder Flexion; Extension;Horizontal ABduction   L Elbow Elbow flexion;Elbow extension   L Position Seated   Equipment Theraband  (green)   L Weights/Reps/Sets 3 sets of 10   Cognition   Overall Cognitive Status WFL   Arousal/Participation Responsive; Cooperative   Attention Within functional limits   Orientation Level Oriented X4   Memory Within functional limits   Following Commands Follows all commands and directions without difficulty   Comments Pt is pleasant and cooperative; needs occasional cues for safety   Activity Tolerance   Activity Tolerance Patient limited by fatigue;Treatment limited secondary to medical complications (Comment)   Medical Staff Made Aware PT, RN   Assessment   Assessment Patient participated in Skilled OT session 11/16/2022 with interventions consisting of Energy Conservation techniques, deep breathing technique, safety awareness and fall prevention techniques, therapeutic exercise to: increase functional use of BUEs, increase BUE muscle strength ,  therapeutic activities to: increase activity tolerance, increase postural control, increase trunk control and increase OOB/ sitting tolerance   Patient agreeable to OT treatment session, upon arrival patient was found supine in bed  In comparison to previous session, patient with improvements in transfers, functional mobility and activity tolerance  Patient requiring frequent rest periods and ocassional safety reminders  Patient continues to be functioning below baseline level, occupational performance remains limited secondary to factors listed above and increased risk for falls and injury     From OT standpoint, recommendation at time of d/c would be Short Term Rehab when medically stable  Patient to benefit from continued Occupational Therapy treatment while in the hospital to address deficits as defined above and maximize level of functional independence with ADLs and functional mobility  Plan   Treatment Interventions ADL retraining;Functional transfer training;UE strengthening/ROM; Endurance training;Patient/family training;Equipment evaluation/education; Compensatory technique education;Continued evaluation; Energy conservation; Activityengagement   Goal Expiration Date 11/28/22   OT Treatment Day 1   OT Frequency 2-3x/wk   Recommendation   OT Discharge Recommendation Post acute rehabilitation services   Additional Comments  The patient's raw score on the AM-PAC Daily Activity inpatient short form is 14, standardized score is 33 39, less than 39 4  Patients at this level are likely to benefit from discharge to post-acute rehabilitation services  Please refer to the recommendation of the Occupational Therapist for safe discharge planning   Additional Comments 2 Pt seen as a co-treatment due to the patient's co-morbidities, clinically unstable presentation, and present impairments which are a regression from the patient's baseline   AM-[facility] Daily Activity Inpatient   Lower Body Dressing 2   Bathing 2   Toileting 2   Upper Body Dressing 2   Grooming 3   Eating 3   Daily Activity Raw Score 14   Daily Activity Standardized Score (Calc for Raw Score >=11) 33 39   AM-[facility] Applied Cognition Inpatient   Following a Speech/Presentation 3   Understanding Ordinary Conversation 4   Taking Medications 4   Remembering Where Things Are Placed or Put Away 4   Remembering List of 4-5 Errands 4   Taking Care of Complicated Tasks 3   Applied Cognition Raw Score 22   Applied Cognition Standardized Score 47 83   End of Consult   Education Provided Yes   Patient Position at End of Consult Bedside chair;Bed/Chair alarm activated; All needs within reach   Nurse Communication Nurse aware of consult     ADDITIONAL OT GOALS:    -Pt will improve functional transfers to Mod I on/off all surfaces using DME as needed w/ G balance/safety     - Pt will improve functional mobility during ADL/IADL/leisure tasks to Mod I using DME as needed w/ G balance/safety     Rubens Bravo MS, OTR/L

## 2022-11-16 NOTE — ANESTHESIA POSTPROCEDURE EVALUATION
Post-Op Assessment Note    CV Status:  Stable  Pain Score: 0    Pain management: adequate     Mental Status:  Sleepy and unresponsive   Hydration Status:  Stable   PONV Controlled:  None   Airway Patency:  Patent   Two or more mitigation strategies used for obstructive sleep apnea   Post Op Vitals Reviewed: Yes      Staff: Anesthesiologist, CRNA   Comments: patient transported to MICU on vent and sedation, VSS, report given to ICU Team        No notable events documented      BP   147/58   Temp   98 2   Pulse  98   Resp   16   SpO2   100

## 2022-11-16 NOTE — PROGRESS NOTES
Progress Note - Surgical Oncology  Jessica Ferreira 61 y o  male MRN: [id number]  Unit/Bed#: [facility] 02 Encounter: [id number]      Assessment:  60yo M POD8 s/p exploratory laparotomy, transverse colon resection, reversal of loop colostomy, peritoneal biopsy, and segment 3 hepatic resection w/ a post-operative course complicated by a LUQ hematoma requiring blood transfusion and hypoxic respiratory failure w/ recurrent encephalopathy requiring endotracheal intubation (now extubated)  Afebrile, VSS on 3-4  L nasal cannula  UOP:  1 5  BM x2     Hemoglobin 7 3 from 7 4 yesterday  Creatinine 1 25 from 1 3  WBC 21 from 17 yesterday    Plan:  - Continue regular diet as tolerated, monitor abdominal distention but patient having bowel function   -off antibiotics  - Needs aggressive pulmonary toilet but exhibiting decreasing oxygen requirements   -appreciate endocrine recommendations for glycemic control  - No evidence of ongoing hemorrhage  - Multimodal analgesia per palliative Care appreciate recommendation  - OOB/ambulation, physical therapy evaluation    Subjective/Objective     Subjective:   No acute events overnight  Objective:     Blood pressure 144/80, pulse 94, temperature 98 9 °F (37 2 °C), temperature source Axillary, resp  rate 18, height 5' 8" (1 727 m), weight 118 kg (259 lb 7 7 oz), SpO2 (!) 82 %  ,Body mass index is 39 45 kg/m²  Intake/Output Summary (Last 24 hours) at 11/16/2022 0615  Last data filed at 11/16/2022 0400  Gross per 24 hour   Intake 360 ml   Output 1580 ml   Net -1220 ml     Physical Exam  Vitals reviewed  Constitutional:       General: He is not in acute distress  Appearance: He is not ill-appearing, toxic-appearing or diaphoretic  HENT:      Head: Normocephalic and atraumatic  Eyes:      Extraocular Movements: Extraocular movements intact  Cardiovascular:      Rate and Rhythm: Normal rate  Pulmonary:      Effort: Pulmonary effort is normal  No respiratory distress  Abdominal:      General: There is distension  Palpations: Abdomen is soft  Tenderness: There is no abdominal tenderness  There is no guarding or rebound  Comments: Incision clean, dry and intact  Some drainage from lateral incision   Skin:     General: Skin is warm and dry  Neurological:      Mental Status: He is alert and oriented to person, place, and time  Mental status is at baseline  Psychiatric:         Mood and Affect: Mood normal          Invasive Devices     Central Venous Catheter Line  Duration           Port A Cath 03/10/22 Right Chest 250 days          Peripheral Intravenous Line  Duration           Peripheral IV 11/13/22 Right;Upper;Ventral (anterior) Arm 2 days    Peripheral IV 11/14/22 Right;Ventral (anterior) Forearm 1 day    Peripheral IV 11/16/22 Dorsal (posterior); Right Hand <1 day          Drain  Duration           Urethral Catheter 16 Fr  <1 day                I have personally reviewed pertinent lab results    , CBC:   Lab Results   Component Value Date    WBC 21 07 (H) 11/16/2022    HGB 7 3 (L) 11/16/2022    HCT 22 5 (L) 11/16/2022    MCV 93 11/16/2022     11/16/2022    MCH 30 2 11/16/2022    MCHC 32 4 11/16/2022    RDW 14 9 11/16/2022    MPV 10 4 11/16/2022    NRBC 0 11/16/2022   , CMP:   Lab Results   Component Value Date    SODIUM 140 11/16/2022    K 3 9 11/16/2022     (H) 11/16/2022    CO2 25 11/16/2022    BUN 29 (H) 11/16/2022    CREATININE 1 25 11/16/2022    CALCIUM 8 3 11/16/2022    EGFR 63 11/16/2022   , Coagulation: No results found for: PT, INR, APTT

## 2022-11-16 NOTE — PROGRESS NOTES
Daily Progress Note - Critical Care   Tamera Rizzo 61 y o  male MRN: [id number]  Unit/Bed#: [facility] 02 Encounter: [id number]        ----------------------------------------------------------------------------------------  HPI/24hr events: No acute events overnight    ---------------------------------------------------------------------------------------  SUBJECTIVE  Complaining of abd pain, improving with pain medications  No fevers/chills  No nausea or vomiting  Improvement in breathing  No fevers/chills    Review of Systems   Constitutional: Negative for chills and fever  Cardiovascular: Negative for leg swelling  Gastrointestinal: Positive for abdominal pain  Review of systems was reviewed and negative unless stated above in HPI/24-hour events   ---------------------------------------------------------------------------------------  Assessment and Plan:    Neuro:   • Acute post-operative pain  o Multimodal analgesia per palliative care, appreciate recommendations  • Acute metabolic encephalopathy, improved  o Sleep-wake cycle, delirium precautions  • Depression  o Continue home cymbalata      CV:   • Paroxysmal atrial tachycardia  o Continuous cardiac monitor  • HTN  o Continue norvasc, prn labetalol  • HLD  o Continue lipitor      Pulm:  • Acute hypoxic respiratory insufficiency  o BiPAP qHS PRN  o Aggressive pulmonary toliet, IS  o Maintain spO2>92%, wean oxygen as tolerated    GI:   • Metastatic colon cancer s/ ex-lap, transverse colon resection, reversal of colostomy, peritoneal bx, and segment 3 liver resection on 11/7  o Consider CT scan given rising leukocytosis and abdominal distention    o Plan per surg onc  • GERD  o Continue home protonix      :   • CKD stage 3  o Monitor BUN/Cr  o Monitor Uo  o Strict I/Os      F/E/N:   • F:none  • E: replace prn  • N: diet as tolerated      Heme/Onc:   • Acute blood loss anemia 2/2 OR losses and LUQ hematoma  o Monitor hemoglobin  o Monitor signs/sx of bleeding  o Hgb remains stable  • DVT ppx  o SQH, SCDs      Endo:   • Type II DM  o SSI and lantus 15 at bedtime  o Endocrine on board, appreciate recommendations  o Monitor BS goal 140-180      ID:   • ESBL E  Coli bactremia  o Appreciate ID recommendations  o Completed course of IV ertapenem   o Monitor fever/wbc curve      MSK/Skin:   • Surgical wound  o Per surg onc  • OOB/ambulate, PT/OT    Patient appropriate for transfer out of the ICU today?: Yes  Disposition: Transfer to Stepdown Level 2  Code Status: Level 1 - Full Code  ---------------------------------------------------------------------------------------  ICU CORE MEASURES    Prophylaxis   VTE Pharmacologic Prophylaxis: Heparin  VTE Mechanical Prophylaxis: sequential compression device  Stress Ulcer Prophylaxis: Pantoprazole PO        Invasive Devices Review  Invasive Devices     Central Venous Catheter Line  Duration           Port A Cath 03/10/22 Right Chest 250 days          Peripheral Intravenous Line  Duration           Peripheral IV 22 Right;Upper;Ventral (anterior) Arm 3 days    Peripheral IV 22 Right;Ventral (anterior) Forearm 1 day    Peripheral IV 22 Dorsal (posterior); Right Hand <1 day          Drain  Duration           Urethral Catheter 16 Fr  <1 day              Can any invasive devices be discontinued today?  No  ---------------------------------------------------------------------------------------  OBJECTIVE    Vitals   Vitals:    22 0000 22 0400 22 0515 22 0600   BP: 138/75 144/80     BP Location: Left arm Left arm     Pulse: 84 90 94 90   Resp: 15 22 18 13   Temp: 98 6 °F (37 °C) 98 9 °F (37 2 °C)     TempSrc: Axillary Axillary     SpO2: 97% 92% (!) 82% 94%   Weight:  118 kg (259 lb 7 7 oz)     Height:         Temp (24hrs), Av 5 °F (36 9 °C), Min:98 1 °F (36 7 °C), Max:98 9 °F (37 2 °C)  Current: Temperature: 98 9 °F (37 2 °C)      Respiratory:  SpO2: SpO2: 94 %  Nasal Cannula O2 Flow Rate (L/min): 6 L/min    Invasive/non-invasive ventilation settings   Respiratory    Lab Data (Last 4 hours)    None         O2/Vent Data (Last 4 hours)    None                Physical Exam  Vitals and nursing note reviewed  Constitutional:       General: He is not in acute distress  Appearance: He is not ill-appearing  HENT:      Head: Normocephalic  Right Ear: External ear normal       Left Ear: External ear normal       Nose: Nose normal       Mouth/Throat:      Mouth: Mucous membranes are moist    Eyes:      Conjunctiva/sclera: Conjunctivae normal    Cardiovascular:      Rate and Rhythm: Normal rate  Pulmonary:      Effort: Pulmonary effort is normal    Abdominal:      General: There is distension  Palpations: Abdomen is soft  Tenderness: There is abdominal tenderness (james-incisional)  There is no guarding or rebound  Musculoskeletal:      Cervical back: Neck supple  Right lower leg: No edema  Left lower leg: No edema  Skin:     General: Skin is warm  Capillary Refill: Capillary refill takes less than 2 seconds  Neurological:      General: No focal deficit present  Mental Status: He is alert and oriented to person, place, and time               Laboratory and Diagnostics:  Results from last 7 days   Lab Units 11/16/22  0430 11/15/22  0517 11/14/22  0545 11/13/22  1230 11/13/22  0442 11/12/22  0604 11/11/22  0430 11/10/22  0000 11/09/22  2215   WBC Thousand/uL 21 07* 17 03* 15 50* 16 15* 15 65* 11 77* 10 24*   < > 1 40*   HEMOGLOBIN g/dL 7 3* 7 4* 7 6* 8 0* 6 7* 7 0* 7 4*   < > 9 7*   HEMATOCRIT % 22 5* 22 6* 23 4* 24 0* 20 3* 20 8* 21 6*   < > 29 6*   PLATELETS Thousands/uL 340 288 249 230 243 198 146*   < > 199   NEUTROS PCT % 83*  --   --   --   --   --  84*  --   --    BANDS PCT %  --   --   --   --   --   --   --   --  6   MONOS PCT % 6  --   --   --   --   --  9  --   --    MONO PCT %  --   --   --   --  13*  --   --   --  0*    < > = values in this interval not displayed  Results from last 7 days   Lab Units 11/16/22  0430 11/15/22  0517 11/14/22  0545 11/13/22  0442 11/12/22  0604 11/11/22  0430 11/10/22  0450   SODIUM mmol/L 140 143 143 142 143 142 140   POTASSIUM mmol/L 3 9 3 7 3 6 3 7 3 9 4 0 4 3   CHLORIDE mmol/L 109* 110* 111* 112* 111* 112* 109*   CO2 mmol/L 25 26 25 26 23 23 23   ANION GAP mmol/L 6 7 7 4 9 7 8   BUN mg/dL 29* 31* 35* 45* 39* 40* 44*   CREATININE mg/dL 1 25 1 30 1 26 1 48* 1 59* 1 70* 2 21*   CALCIUM mg/dL 8 3 8 1* 8 2* 8 1* 8 2* 8 2* 8 1*   GLUCOSE RANDOM mg/dL 185* 179* 192* 197* 214* 156* 175*   ALT U/L  --   --  46 61 62 86* 137*   AST U/L  --   --  49* 69* 65* 68* 127*   ALK PHOS U/L  --   --  228* 265* 304* 181* 247*   ALBUMIN g/dL  --   --  1 7* 1 8* 1 8* 1 9* 1 7*   TOTAL BILIRUBIN mg/dL  --   --  1 18* 1 08* 1 33* 1 01* 0 93     Results from last 7 days   Lab Units 11/15/22  0517 11/14/22  0545 11/13/22  0442 11/12/22  0604 11/11/22  0430 11/10/22  0450 11/09/22  2215   MAGNESIUM mg/dL 2 4 2 4 2 5 2 6 2 7* 2 5 2 4   PHOSPHORUS mg/dL 3 9 3 9 4 2 4 5 4 5 5 0* 5 7*      Results from last 7 days   Lab Units 11/12/22  0604 11/09/22  2215   INR  1 10 1 24*   PTT seconds  --  37          Results from last 7 days   Lab Units 11/16/22  0430 11/10/22  0450 11/10/22  0215 11/10/22  0000 11/09/22  2215 11/09/22  0737   LACTIC ACID mmol/L 1 6 2 0 2 4* 2 9* 5 6* 1 8     ABG:  Results from last 7 days   Lab Units 11/14/22  0545   PH ART  7 494*   PCO2 ART mm Hg 31 9*   PO2 ART mm Hg 79 1   HCO3 ART mmol/L 24 0   BASE EXC ART mmol/L 0 8   ABG SOURCE  Line, Arterial     VBG:  Results from last 7 days   Lab Units 11/16/22  0430 11/14/22  0545   PH RUMA  7 420*  --    PCO2 RUMA mm Hg 40 1*  --    PO2 RUMA mm Hg 39 0  --    HCO3 RUMA mmol/L 25 4  --    BASE EXC RUMA mmol/L 0 9  --    ABG SOURCE   --  Line, Arterial           Micro  Results from last 7 days   Lab Units 11/10/22  0450   BLOOD CULTURE  No Growth After 5 Days  No Growth After 5 Days  EKG:   Imaging:  I have personally reviewed pertinent films in PACS    Intake and Output  I/O       11/14 0701  11/15 0700 11/15 0701 11/16 0700    P  O  480 240    Total Intake(mL/kg) 480 (4) 240 (2)    Urine (mL/kg/hr) 1179 (0 4) 1380 (0 5)    Stool 0 0    Total Output 1179 1380    Net -699 -1140          Unmeasured Stool Occurrence 1 x 2 x            Height and Weights   Height: 5' 8" (172 7 cm)  IBW (Ideal Body Weight): 68 4 kg  Body mass index is 39 45 kg/m²  Weight (last 2 days)     Date/Time Weight    11/16/22 0400 118 (259 48)            Nutrition       Diet Orders   (From admission, onward)             Start     Ordered    11/14/22 1641  Diet Germán/CHO Controlled; Consistent Carbohydrate Diet Level 2 (5 carb servings/75 grams CHO/meal)  Diet effective now        Comments: Surgical sips, no carbonation   References:    Nutrtion Support Algorithm Enteral vs  Parenteral   Question Answer Comment   Diet Type Germán/CHO Controlled    Germán/CHO Controlled Consistent Carbohydrate Diet Level 2 (5 carb servings/75 grams CHO/meal)    RD to adjust diet per protocol?  No        11/14/22 1641    11/14/22 1219  Dietary nutrition supplements  Once        Question Answer Comment   Select Supplement: Ensure Plus High Protein Strawberry    Frequency Lunch, Dinner        11/14/22 1219                    Active Medications  Scheduled Meds:  Current Facility-Administered Medications   Medication Dose Route Frequency Provider Last Rate   • acetaminophen  975 mg Oral Q8H [facility] Annabelle Huerta PA-C     • amLODIPine  10 mg Oral BID Ludwin Magallanes PA-C     • atorvastatin  40 mg Oral Daily With Nemesio Steele PA-C     • dicyclomine  10 mg Oral TID AC Bre Haji MD     • DULoxetine  60 mg Oral Daily Deion Huerta PA-C     • ertapenem  1,000 mg Intravenous Q24H GAURANG العلي 1,000 mg (11/15/22 1727)   • heparin (porcine)  5,000 Units Subcutaneous [facility] Allie Nail, DO     • HYDROmorphone  0 5 mg Intravenous Q4H PRN David Nava PA-C     • insulin glargine  15 Units Subcutaneous HS GAURANG Bee     • insulin lispro  1-6 Units Subcutaneous HS GAURANG Bee     • insulin lispro  2-12 Units Subcutaneous TID AC GAURANG Bee     • labetalol  10 mg Intravenous Q4H PRN Milena Preston DO     • melatonin  6 mg Oral HS GAURANG Bee     • methocarbamol  500 mg Oral BID Prince Alen Huerta PA-C     • ondansetron  4 mg Intravenous Q4H PRN Joie Davis MD     • oxyCODONE  5 mg Oral Q4H PRN David Nava PA-C      Or   • oxyCODONE  10 mg Oral Q4H PRN Prince Alen Huerta PA-C     • pantoprazole  40 mg Oral Early Morning Annabelle GHANSHYAM Huerta PA-C     • phenol  1 spray Mouth/Throat Q2H PRN Leonila Day MD       Continuous Infusions:     PRN Meds:   HYDROmorphone, 0 5 mg, Q4H PRN  labetalol, 10 mg, Q4H PRN  ondansetron, 4 mg, Q4H PRN  oxyCODONE, 5 mg, Q4H PRN   Or  oxyCODONE, 10 mg, Q4H PRN  phenol, 1 spray, Q2H PRN        Allergies   Allergies   Allergen Reactions   • Shellfish-Derived Products - Food Allergy Anaphylaxis   • Erythromycin GI Intolerance     ---------------------------------------------------------------------------------------  Advance Directive and Living Will:      Power of :    POLST:    ---------------------------------------------------------------------------------------  Care Time Delivered:       Diana Marshall DO      Portions of the record may have been created with voice recognition software  Occasional wrong word or "sound a like" substitutions may have occurred due to the inherent limitations of voice recognition software    Read the chart carefully and recognize, using context, where substitutions have occurred

## 2022-11-16 NOTE — ANESTHESIA PROCEDURE NOTES
Arterial Line Insertion  Performed by: Eugenia Chavez CRNA  Authorized by: Jessica Stevens MD   Consent: Written consent obtained  Risks and benefits: risks, benefits and alternatives were discussed  Consent given by: patient  Patient understanding: patient states understanding of the procedure being performed  Patient consent: the patient's understanding of the procedure matches consent given  Procedure consent: procedure consent matches procedure scheduled  Relevant documents: relevant documents present and verified  Required items: required blood products, implants, devices, and special equipment available  Patient identity confirmed: verbally with patient and arm band  Preparation: Patient was prepped and draped in the usual sterile fashion    Indications: hemodynamic monitoring  Orientation:  Right  Location: radial artery  Procedure Details:  Needle gauge: 20  Seldinger technique: Seldinger technique used  Number of attempts: 2    Post-procedure:  Post-procedure: dressing applied  Waveform: good waveform and waveform confirmed  Patient tolerance: Patient tolerated the procedure well with no immediate complications  Comments: US guidance

## 2022-11-16 NOTE — PHYSICAL THERAPY NOTE
Physical Therapy treatment note     Patient Name: Martín Alva    [id number] Date: 11/16/2022     Problem List  Principal Problem:    Colon cancer metastasized to liver [facility])  Active Problems:    Benign essential hypertension    Type 2 diabetes mellitus with hyperlipidemia (HCC)    Malignant neoplasm of transverse colon (HCC)    Encephalopathy    Flash pulmonary edema (HCC)    MR (mitral regurgitation)    Bacteremia    ESBL (extended spectrum beta-lactamase) producing bacteria infection    Acute respiratory failure with hypoxia [facility])       Past Medical History  Past Medical History:   Diagnosis Date   • Abdominal pain 03/12/2022   • Acute renal failure ([facility] )     [id number] resolved   • Cancer ([facility] )    • Diabetes mellitus ([facility] )    • Enteritis 08/23/2016   • Gastroparesis due to DM ([facility] ) 08/23/2016   • GERD (gastroesophageal reflux disease)    • Hernia, ventral 08/04/2016   • Hyperlipidemia    • Hypertension    • Morbid obesity ([facility] ) 04/17/2018   • Postoperative visit 03/02/2022   • SIRS (systemic inflammatory response syndrome) ([facility] ) 03/12/2022   • Snoring    • Stage 3a chronic kidney disease ([facility] ) 02/19/2022        Past Surgical History  Past Surgical History:   Procedure Laterality Date   • COLONOSCOPY     • COLOSTOMY N/A 02/20/2022    Procedure: COLOSTOMY LOOP, diverting;  Surgeon: Nicola Mckeon MD;  Location: [facility];  Service: General   • ESOPHAGOGASTRODUODENOSCOPY N/A 08/24/2016    Procedure: ESOPHAGOGASTRODUODENOSCOPY (EGD); Surgeon: Fatou Elias MD;  Location: [facility] GI LAB;   Service:    • ILEOSTOMY CLOSURE N/A 11/7/2022    Procedure: REVERSAL COLOSTOMY;  Surgeon: Sapna Parmar MD;  Location:  MAIN OR;  Service: Surgical Oncology   • IR PORT PLACEMENT  03/10/2022   • KIDNEY STONE SURGERY     • LIVER BIOPSY LAPAROSCOPIC N/A 02/20/2022    Procedure: DIAGNOSTIC LAPAROSCOPIC LIVER BIOPSY, DIVERTING LOOP COLOSTOMY ;  Surgeon: Nicola Mckeon MD; Location: [facility];  Service: General   • LIVER LOBECTOMY N/A 11/7/2022    Procedure: SEGMENT 3 LIVER RESECTION, ABLATION, INTRAOPERATIVE U/S OF LIVER, PERITONEAL BIOPSY;  Surgeon: Dempsey Fabry, MD;  Location: [facility];  Service: Surgical Oncology   • RIGHT COLON RESECTION N/A 11/7/2022    Procedure: EX LAP, TRANVERSE COLON RESECTION;  Surgeon: Dempsey Fabry, MD;  Location: [facility];  Service: Surgical Oncology   • TONSILLECTOMY     • UMBILICAL HERNIA REPAIR LAPAROSCOPIC N/A 04/26/2019    Procedure: LAPAROSCOPIC UMBILICAL HERNIA REPAIR;  Surgeon: Lyssa Moon MD;  Location: [facility];  Service: General      11/16/22 1025   PT Last Visit   PT Visit Date 11/16/22   Note Type   Note Type Treatment   Pain Assessment   Pain Assessment Tool 0-10   Pain Score 3   Restrictions/Precautions   Weight Bearing Precautions Per Order No   Other Precautions Multiple lines;Telemetry; Fall Risk;O2;Pain  (HFNC)   General   Chart Reviewed Yes   Family/Caregiver Present No   Cognition   Overall Cognitive Status WFL   Arousal/Participation Alert; Responsive; Cooperative   Attention Attends with cues to redirect   Bed Mobility   Rolling L 5  Supervision   Supine to Sit 4  Minimal assistance   Additional items Assist x 1   Additional Comments sitting EOB S level   Transfers   Sit to Stand 3  Moderate assistance   Additional items Assist x 1   Stand to Sit 3  Moderate assistance   Additional items Assist x 1   Ambulation/Elevation   Gait pattern Excessively slow; Step to; Foward flexed; Shuffling; Antalgic   Gait Assistance 3  Moderate assist   Additional items Assist x 1   Assistive Device Rolling walker   Distance 3ft to chair   Balance   Static Sitting Fair   Dynamic Sitting Fair -   Static Standing Poor +   Dynamic Standing Poor +   Ambulatory Poor   Endurance Deficit   Endurance Deficit Yes   Activity Tolerance   Activity Tolerance Patient limited by fatigue;Patient limited by pain   Medical Staff Made Aware OT   Nurse Made Aware nurse approved therapy session   Assessment   Prognosis Good   Problem List Decreased strength;Decreased endurance; Impaired balance;Decreased mobility;Pain   Assessment Pt presents to therapy today with reduced mobility, poor endurance, risk of falling, pain, O2 dependent, fatigue  These impairments limit the patient by requiring rest breaks and PLB  Pt would benefit from continued skilled therapy while in the hospital to improve overall mobility and work towards a safe d/c  Recommend rehab  At end of session patient was left seated with call bell within reach  Pt is motivated today and willing to get out of bed to chair  Pt given HEP  The patient's AM-PAC Basic Mobility Inpatient Short Form Raw Score is 12  A Raw score of less than or equal to 16 suggests the patient may benefit from discharge to post-acute rehabilitation services  Please also refer to the recommendation of the Physical Therapist for safe discharge planning  Pt's SpO2 dropped to 77% on HFNC with activity however slowly came back to >92%  Pt instructed to perform PLB   Goals   STG Expiration Date 11/28/22   Short Term Goal #1 Goals added 11/16 STG 1: Pt will perform transfers at a MI level to improve mobility and work towards a safe d/c  STG 2 :Pt will ambulate 300ft with RW at a S level to work towards a safe d c    PT Treatment Day 2   Plan   Treatment/Interventions Functional transfer training;LE strengthening/ROM; Therapeutic exercise; Endurance training;Gait training;Bed mobility; Equipment eval/education;OT   PT Frequency 3-5x/wk   Recommendation   PT Discharge Recommendation Post acute rehabilitation services   AM-[facility] Basic Mobility Inpatient   Turning in Bed Without Bedrails 3   Lying on Back to Sitting on Edge of Flat Bed 3   Moving Bed to Chair 2   Standing Up From Chair 2   Walk in Room 1   Climb 3-5 Stairs 1   Basic Mobility Inpatient Raw Score 12   Basic Mobility Standardized Score 32 23   Turning Head Towards Sound 4   Follow Simple Instructions 4   Low Function Basic Mobility Raw Score 20   Low Function Basic Mobility Standardized Score 32 8   Highest Level Of Mobility   [facility] Goal 4: Move to chair/commode   Norberto Ramirez, PT, DPT

## 2022-11-16 NOTE — PROGRESS NOTES
Progress Note - Infectious Disease   Tawny Hua 61 y o  male MRN: [id number]  Unit/Bed#: OR POOL Encounter: [id number]      Impression/Recommendations:  1  Sepsis, secondary to bacteremia   Patient is clinically much improved   Fever was prolonged but finally resolved   WBC  was improving but increased today  Nora Shah remains systemically well, without toxicity and hemodynamically stable, without hypotension  Antibiotic plan as in below  Monitor temperature/WBC  Monitor hemodynamics      2  ESBL producing E coli bacteremia, likely secondary to gut translocation during recent colon surgery   Patient is clinically improved   Bacteremia cleared  Patient completed antibiotic course for this but given probable anastomotic leak, will continue antibiotic for now  No further antibiotic needed for this      3  Probable anastomotic leak  Patient has worsening leukocytosis today  Repeat abdomen/pelvis CT with gas at transverse colon anastomotic site, concerning for anastomotic leak  Patient is to return to OR for exploration and repair  Will continue antibiotic for now  Continue IV ertapenem for now  Plan for surgery later today noted  4  Metastatic colon cancer, with liver metastases   Patient is status post transverse colon resection  Surgical oncology follow-up      5  ALEXY, superimposed on CKD   Creatinine is improved  Antibiotic at full dose  Monitor creatinine      6  Acute hypoxic respiratory failure, previously on ventilator   Patient is now extubated  Respiratory status is still tenuous, mostly secondary to intra-abdominal process and atelectasis  No clinical pneumonia  Monitor respiratory status      7  Encephalopathy, multifactorial   Mental status is much improved  Monitor mental status      8  Type 2 DM      Discussed with patient in detail regarding the above plan  Discussed with Critical Care surgery Service      Antibiotics:  Ertapenem # 8     Subjective:  Patient remains in ICU    He remains extubated  Dyspnea  still significant but stable  Abdominal pain stable  Temperature stays down   No chills  He is tolerating antibiotic well   No nausea, vomiting or diarrhea      Objective:  Vitals:  Temp:  [98 5 °F (36 9 °C)-98 9 °F (37 2 °C)] 98 9 °F (37 2 °C)  HR:  [] 90  Resp:  [13-24] 14  BP: (138-164)/(74-86) 144/74  SpO2:  [82 %-99 %] 96 %  Temp (24hrs), Av 7 °F (37 1 °C), Min:98 5 °F (36 9 °C), Max:98 9 °F (37 2 °C)  Current: Temperature: 98 9 °F (37 2 °C)    Physical Exam:     General: Awake, alert, cooperative, no distress  Neck:  Supple  No mass  No lymphadenopathy  Lungs: Expansion symmetric, basilar rhonchi and sparse rales, no rales, no wheezing, respirations labored  Heart:  Regular rate and rhythm, S1 and S2 normal, no murmur  Abdomen: Soft, stable distention  Incision without purulence, with stable incisional tenderness  Extremities: Mild leg edema  No erythema/warmth  No ulcer  Nontender to palpation  Skin:  No rash  Neuro: Moves all extremities  Invasive Devices     Central Venous Catheter Line  Duration           Port A Cath 03/10/22 Right Chest 251 days          Peripheral Intravenous Line  Duration           Peripheral IV 22 Right;Upper;Ventral (anterior) Arm 3 days    Peripheral IV 22 Right;Ventral (anterior) Forearm 1 day    Peripheral IV 22 Dorsal (posterior); Right Hand <1 day          Drain  Duration           Urethral Catheter 16 Fr  1 day                Labs studies:   I have personally reviewed pertinent labs    Results from last 7 days   Lab Units 22  0430 11/15/22  0517 22  0545 22  0442 22  0604   POTASSIUM mmol/L 3 9 3 7 3 6 3 7 3 9   CHLORIDE mmol/L 109* 110* 111* 112* 111*   CO2 mmol/L 25 26 25 26 23   BUN mg/dL 29* 31* 35* 45* 39*   CREATININE mg/dL 1 25 1 30 1 26 1 48* 1 59*   EGFR ml/min/1 73sq m 63 60 62 51 47   CALCIUM mg/dL 8 3 8 1* 8 2* 8 1* 8 2*   AST U/L  --   --  49* 69* 65*   ALT U/L  -- --  46 61 62   ALK PHOS U/L  --   --  228* 265* 304*     Results from last 7 days   Lab Units 11/16/22  0430 11/15/22  0517 11/14/22  0545   WBC Thousand/uL 21 07* 17 03* 15 50*   HEMOGLOBIN g/dL 7 3* 7 4* 7 6*   PLATELETS Thousands/uL 340 288 249     Results from last 7 days   Lab Units 11/10/22  0450   BLOOD CULTURE  No Growth After 5 Days  No Growth After 5 Days  Imaging Studies:   I have personally reviewed pertinent imaging study reports and images in PACS  Chest/abdomen/pelvis CT reviewed personally  Multifocal atelectasis without consolidation  Gas present at transverse colon anastomotic site  EKG, Pathology, and Other Studies:   I have personally reviewed pertinent reports

## 2022-11-16 NOTE — RESPIRATORY THERAPY NOTE
RT Ventilator Management Note  Satish Qiu 61 y o  male MRN: [id number]  Unit/Bed#: [facility] 517-01 Encounter: [id number]      Daily Screen         11/10/2022  0718 11/11/2022  0813          Patient safety screen outcome[de-identified] Failed Passed      Not Ready for Weaning due to[de-identified] Underline problem not resolved --      Spont breathing trial % for 30 min: -- Yes                Physical Exam:          Resp Comments: (P) pt taken from MICU 2 to 517  pt ventilated with ambu bag at 100%  pt placed on ventilator upon arrival to 517

## 2022-11-16 NOTE — PROGRESS NOTES
Critical Care Interval Progress Note - Post-Op Check     Rd Quintanilla 61 y o  male MRN: [id number]  Unit/Bed#: [facility] 517-01 Encounter: [id number]    Subjective and Objective:  Patient is POD # s/p ex-lap, transverse colonic resection, reversal of loop colostomy, and hepatic resection  Post-op course was complicated by LUQ hematoma, encephalopathy, and hypoxic respiratory failure requiring intubation  Patient was extubated on 11/11/22  Today, patient taken urgently to the OR following a CT revealing a likely transverse colonic anastomotic leak and hematoma following increased abdominal pain and worsening WBC  He presents s/p ex-lap with right hemicolectomy/cecectomy for anastomotic leak, left in discontinuity with Abthera VAC and VICTORINO drain x2  Physical Exam  Vitals reviewed  Constitutional:       General: He is not in acute distress  Interventions: He is sedated, intubated and restrained  HENT:      Head: Normocephalic and atraumatic  Mouth/Throat:      Lips: Pink  Mouth: Mucous membranes are moist    Eyes:      Pupils: Pupils are equal, round, and reactive to light  Cardiovascular:      Rate and Rhythm: Normal rate and regular rhythm  Pulses:           Radial pulses are 2+ on the right side and 2+ on the left side  Dorsalis pedis pulses are 2+ on the right side and 2+ on the left side  Heart sounds: Normal heart sounds  Pulmonary:      Effort: Pulmonary effort is normal  He is intubated  Breath sounds: Examination of the right-middle field reveals decreased breath sounds  Examination of the left-middle field reveals decreased breath sounds  Examination of the right-lower field reveals decreased breath sounds  Examination of the left-lower field reveals decreased breath sounds  Decreased breath sounds present  No wheezing, rhonchi or rales  Abdominal:      Palpations: Abdomen is soft            Comments: Bilateral VICTORINO x2 with serosang output Genitourinary:     Comments: Sruthi  Musculoskeletal:      Right lower le+ Pitting Edema present  Left lower le+ Pitting Edema present  Skin:     General: Skin is warm and dry  Capillary Refill: Capillary refill takes less than 2 seconds  Neurological:      General: No focal deficit present  GCS: GCS eye subscore is 2  GCS verbal subscore is 1  GCS motor subscore is 5  Results from last 7 days   Lab Units 22  1546 22  1343 22  0430 11/15/22  0517 22  0545 22  1230 22  0442 22  0604 22  0430 11/10/22  0000 22  2215   WBC Thousand/uL 25 84*  --  21 07* 17 03* 15 50* 16 15* 15 65* 11 77* 10 24*   < > 1 40*   HEMOGLOBIN g/dL 10 7*  --  7 3* 7 4* 7 6* 8 0* 6 7* 7 0* 7 4*   < > 9 7*   I STAT HEMOGLOBIN g/dl  --  8 5*  --   --   --   --   --   --   --   --   --    HEMATOCRIT % 33 9*  --  22 5* 22 6* 23 4* 24 0* 20 3* 20 8* 21 6*   < > 29 6*   HEMATOCRIT, ISTAT %  --  25*  --   --   --   --   --   --   --   --   --    PLATELETS Thousands/uL 454*  --  340 288 249 230 243 198 146*   < > 199   NEUTROS PCT %  --   --  83*  --   --   --   --   --  84*  --   --    BANDS PCT % 9*  --   --   --   --   --   --   --   --   --  6   MONOS PCT %  --   --  6  --   --   --   --   --  9  --   --    MONO PCT % 0*  --   --   --   --   --  13*  --   --   --  0*    < > = values in this interval not displayed       Results from last 7 days   Lab Units 22  1546 22  1343 22  0430 11/15/22  0517 22  0545 22  0442 22  0604 22  0430 11/10/22  0450   SODIUM mmol/L 143  --  140 143 143 142 143 142 140   POTASSIUM mmol/L 4 2  --  3 9 3 7 3 6 3 7 3 9 4 0 4 3   CHLORIDE mmol/L 113*  --  109* 110* 111* 112* 111* 112* 109*   CO2 mmol/L 22  --  25 26 25 26 23 23 23   CO2, I-STAT mmol/L  --  27  --   --   --   --   --   --   --    ANION GAP mmol/L 8  --  6 7 7 4 9 7 8   BUN mg/dL 31*  --  29* 31* 35* 45* 39* 40* 44*   CREATININE mg/dL 1 33*  --  1 25 1 30 1 26 1 48* 1 59* 1 70* 2 21*   CALCIUM mg/dL 7 5*  --  8 3 8 1* 8 2* 8 1* 8 2* 8 2* 8 1*   ALT U/L 23  --   --   --  46 61 62 86* 137*   AST U/L 40  --   --   --  49* 69* 65* 68* 127*   ALK PHOS U/L 220*  --   --   --  228* 265* 304* 181* 247*   ALBUMIN g/dL 1 4*  --   --   --  1 7* 1 8* 1 8* 1 9* 1 7*   TOTAL BILIRUBIN mg/dL 1 53*  --   --   --  1 18* 1 08* 1 33* 1 01* 0 93     Results from last 7 days   Lab Units 11/16/22  1546 11/15/22  0517 11/14/22  0545 11/13/22  0442 11/12/22  0604 11/11/22  0430 11/10/22  0450   MAGNESIUM mg/dL 2 2 2 4 2 4 2 5 2 6 2 7* 2 5   PHOSPHORUS mg/dL 5 0* 3 9 3 9 4 2 4 5 4 5 5 0*      Results from last 7 days   Lab Units 11/12/22  0604 11/09/22  2215   INR  1 10 1 24*   PTT seconds  --  37         Results from last 7 days   Lab Units 11/16/22  1546 11/16/22  0430 11/10/22  0450 11/10/22  0215 11/10/22  0000 11/09/22  2215   LACTIC ACID mmol/L 1 4 1 6 2 0 2 4* 2 9* 5 6*     ABG:  Lab Results   Component Value Date    PHART 7 359 11/16/2022    HLI3PNA 40 2 11/16/2022    PO2ART 130 4 (H) 11/16/2022    PDN7DTW 22 1 11/16/2022    BEART -3 1 11/16/2022    SOURCE Line, Arterial 11/16/2022           Assessment and Plan:  Principal Problem:    Colon cancer metastasized to liver [facility])  Active Problems:    Benign essential hypertension    Type 2 diabetes mellitus with hyperlipidemia (HCC)    Malignant neoplasm of transverse colon (HCC)    Encephalopathy    Flash pulmonary edema (HCC)    MR (mitral regurgitation)    Bacteremia    ESBL (extended spectrum beta-lactamase) producing bacteria infection    Acute respiratory failure with hypoxia (HCC)  Resolved Problems:    * No resolved hospital problems   *    · End points WNL, will hold off continuous IVF given evidence of pulmonary edema and effusions  · Can bolus fas indicated   · AC/VC: 14/450/8/60%  · Wean PEEP to maintain SpO2 > 90%  · Keep PEEP 8 overnight to help with recruitment  · Fentanyl and propofol for analgesia and sedation   · Ertapenem resumed, continue for now   · NPO with NGT     GAURANG Colindres      Portions of the record may have been created with voice recognition software  Occasional wrong word or "sound a like" substitutions may have occurred due to the inherent limitations of voice recognition software    Read the chart carefully and recognize, using context, where substitutions have occurred

## 2022-11-16 NOTE — PLAN OF CARE
Problem: OCCUPATIONAL THERAPY ADULT  Goal: Performs self-care activities at highest level of function for planned discharge setting  See evaluation for individualized goals  Description: Treatment Interventions: ADL retraining, Functional transfer training, UE strengthening/ROM, Endurance training, Patient/family training, Equipment evaluation/education, Compensatory technique education, Continued evaluation, Energy conservation, Activityengagement          See flowsheet documentation for full assessment, interventions and recommendations  Outcome: Progressing  Note: Limitation: Decreased ADL status, Decreased UE strength, Decreased Safe judgement during ADL, Decreased endurance, Decreased self-care trans, Decreased high-level ADLs, Decreased sensation  Prognosis: Fair  Assessment: Patient participated in Skilled OT session 11/16/2022 with interventions consisting of Energy Conservation techniques, deep breathing technique, safety awareness and fall prevention techniques, therapeutic exercise to: increase functional use of BUEs, increase BUE muscle strength ,  therapeutic activities to: increase activity tolerance, increase postural control, increase trunk control and increase OOB/ sitting tolerance   Patient agreeable to OT treatment session, upon arrival patient was found supine in bed  In comparison to previous session, patient with improvements in transfers, functional mobility and activity tolerance  Patient requiring frequent rest periods and ocassional safety reminders  Patient continues to be functioning below baseline level, occupational performance remains limited secondary to factors listed above and increased risk for falls and injury  From OT standpoint, recommendation at time of d/c would be Short Term Rehab when medically stable     Patient to benefit from continued Occupational Therapy treatment while in the hospital to address deficits as defined above and maximize level of functional independence with ADLs and functional mobility       OT Discharge Recommendation: Post acute rehabilitation services   Rigo Norris MS, OTR/L

## 2022-11-16 NOTE — ANESTHESIA PREPROCEDURE EVALUATION
Procedure:  LAPAROTOMY EXPLORATORY W/ BOWEL RESECTION (Abdomen)  60yo M metastatic colon ca POD8 s/p exploratory laparotomy, transverse colon resection, reversal of loop colostomy, peritoneal biopsy, and segment 3 hepatic resection w/ a post-operative course complicated by a LUQ hematoma requiring blood transfusion and hypoxic respiratory failure w/ recurrent encephalopathy requiring endotracheal intubation (now extubated)  Relevant Problems   CARDIO   (+) Benign essential hypertension   (+) MR (mitral regurgitation)   (+) Mixed hyperlipidemia      ENDO   (+) Type 2 diabetes mellitus with hyperlipidemia (HCC)   (+) Type 2 diabetes mellitus without complication, with long-term current use of insulin (HCC)      GI/HEPATIC   (+) Colon cancer metastasized to liver (HCC)   (+) Malignant neoplasm of transverse colon (HCC)      HEMATOLOGY   (+) Iron deficiency anemia, unspecified   (+) Microcytic anemia      PULMONARY   (+) Acute respiratory failure with hypoxia ([address] )      Recent labs personally reviewed:  Lab Results   Component Value Date    WBC 21 07 (H) 11/16/2022    HGB 7 3 (L) 11/16/2022     11/16/2022     Lab Results   Component Value Date     05/02/2017    K 3 9 11/16/2022    BUN 29 (H) 11/16/2022    CREATININE 1 25 11/16/2022    GLUCOSE 169 (H) 11/07/2022     Lab Results   Component Value Date    PTT 37 11/09/2022      Lab Results   Component Value Date    INR 1 10 11/12/2022       Lab Results   Component Value Date    HGBA1C 8 0 (H) 09/26/2022       Type and Screen:  O    TTE 11/16    Interpretation Summary       •  Left Ventricle: Left ventricular cavity size is normal  Wall thickness is moderately increased  There is moderate concentric hypertrophy  The left ventricular ejection fraction is 55%  Systolic function is normal  Wall motion is normal   •  Mitral Valve: There is mild annular calcification  There is trace regurgitation  •  Tricuspid Valve: There is mild regurgitation        Physical Exam    Airway    Mallampati score: III  TM Distance: >3 FB  Neck ROM: full     Dental   No notable dental hx     Cardiovascular  Cardiovascular exam normal    Pulmonary  Comment: 6l NRB satting 94, RR 20, Pulmonary exam normal Decreased breath sounds,     Other Findings        Anesthesia Plan  ASA Score- 4 Emergent    Anesthesia Type- general with ASA Monitors  Additional Monitors: arterial line  Airway Plan: ETT  Comment: Post-op ventilation likely          Plan Factors-Exercise tolerance (METS): <4 METS  Chart reviewed  EKG reviewed  Imaging results reviewed  Existing labs reviewed  Patient summary reviewed  Patient is not a current smoker  Induction- intravenous  Postoperative Plan-     Informed Consent- Anesthetic plan and risks discussed with patient  I personally reviewed this patient with the CRNA  Discussed and agreed on the Anesthesia Plan with the CRNA  Raegan Vitale

## 2022-11-16 NOTE — PLAN OF CARE
Problem: MOBILITY - ADULT  Goal: Maintain or return to baseline ADL function  Description: INTERVENTIONS:  -  Assess patient's ability to carry out ADLs; assess patient's baseline for ADL function and identify physical deficits which impact ability to perform ADLs (bathing, care of mouth/teeth, toileting, grooming, dressing, etc )  - Assess/evaluate cause of self-care deficits   - Assess range of motion  - Assess patient's mobility; develop plan if impaired  - Assess patient's need for assistive devices and provide as appropriate  - Encourage maximum independence but intervene and supervise when necessary  - Involve family in performance of ADLs  - Assess for home care needs following discharge   - Consider OT consult to assist with ADL evaluation and planning for discharge  - Provide patient education as appropriate  Outcome: Progressing  Goal: Maintains/Returns to pre admission functional level  Description: INTERVENTIONS:  - Perform BMAT or MOVE assessment daily    - Set and communicate daily mobility goal to care team and patient/family/caregiver  - Collaborate with rehabilitation services on mobility goals if consulted  - Perform Range of Motion 3 times a day  - Reposition patient every 2 hours    - Dangle patient 3 times a day  - Stand patient 3 times a day  - Ambulate patient 3 times a day  - Out of bed to chair 3 times a day   - Out of bed for meals 3 times a day  - Out of bed for toileting  - Record patient progress and toleration of activity level   Outcome: Progressing     Problem: Prexisting or High Potential for Compromised Skin Integrity  Goal: Skin integrity is maintained or improved  Description: INTERVENTIONS:  - Identify patients at risk for skin breakdown  - Assess and monitor skin integrity  - Assess and monitor nutrition and hydration status  - Monitor labs   - Assess for incontinence   - Turn and reposition patient  - Assist with mobility/ambulation  - Relieve pressure over bony prominences  - Avoid friction and shearing  - Provide appropriate hygiene as needed including keeping skin clean and dry  - Evaluate need for skin moisturizer/barrier cream  - Collaborate with interdisciplinary team   - Patient/family teaching  - Consider wound care consult   Outcome: Progressing     Problem: Potential for Falls  Goal: Patient will remain free of falls  Description: INTERVENTIONS:  - Educate patient/family on patient safety including physical limitations  - Instruct patient to call for assistance with activity   - Consult OT/PT to assist with strengthening/mobility   - Keep Call bell within reach  - Keep bed low and locked with side rails adjusted as appropriate  - Keep care items and personal belongings within reach  - Initiate and maintain comfort rounds  - Make Fall Risk Sign visible to staff  - Offer Toileting every 2 Hours, in advance of need  - Initiate/Maintain bed alarm  - Obtain necessary fall risk management equipment: bed alarm  - Apply yellow socks and bracelet for high fall risk patients  - Consider moving patient to room near nurses station  Outcome: Progressing     Problem: Nutrition/Hydration-ADULT  Goal: Nutrient/Hydration intake appropriate for improving, restoring or maintaining nutritional needs  Description: Monitor and assess patient's nutrition/hydration status for malnutrition  Collaborate with interdisciplinary team and initiate plan and interventions as ordered  Monitor patient's weight and dietary intake as ordered or per policy  Utilize nutrition screening tool and intervene as necessary  Determine patient's food preferences and provide high-protein, high-caloric foods as appropriate       INTERVENTIONS:  - Monitor oral intake, urinary output, labs, and treatment plans  - Assess nutrition and hydration status and recommend course of action  - Evaluate amount of meals eaten  - Assist patient with eating if necessary   - Allow adequate time for meals  - Recommend/ encourage appropriate diets, oral nutritional supplements, and vitamin/mineral supplements  - Order, calculate, and assess calorie counts as needed  - Recommend, monitor, and adjust tube feedings and TPN/PPN based on assessed needs  - Assess need for intravenous fluids  - Provide specific nutrition/hydration education as appropriate  - Include patient/family/caregiver in decisions related to nutrition  Outcome: Progressing     Problem: PAIN - ADULT  Goal: Verbalizes/displays adequate comfort level or baseline comfort level  Description: Interventions:  - Encourage patient to monitor pain and request assistance  - Assess pain using appropriate pain scale  - Administer analgesics based on type and severity of pain and evaluate response  - Implement non-pharmacological measures as appropriate and evaluate response  - Consider cultural and social influences on pain and pain management  - Notify physician/advanced practitioner if interventions unsuccessful or patient reports new pain  Outcome: Progressing     Problem: INFECTION - ADULT  Goal: Absence or prevention of progression during hospitalization  Description: INTERVENTIONS:  - Assess and monitor for signs and symptoms of infection  - Monitor lab/diagnostic results  - Monitor all insertion sites, i e  indwelling lines, tubes, and drains  - Monitor endotracheal if appropriate and nasal secretions for changes in amount and color  - [place] appropriate cooling/warming therapies per order  - Administer medications as ordered  - Instruct and encourage patient and family to use good hand hygiene technique  - Identify and instruct in appropriate isolation precautions for identified infection/condition  Outcome: Progressing  Goal: Absence of fever/infection during neutropenic period  Description: INTERVENTIONS:  - Monitor WBC    Outcome: Progressing     Problem: SAFETY ADULT  Goal: Maintain or return to baseline ADL function  Description: INTERVENTIONS:  -  Assess patient's ability to carry out ADLs; assess patient's baseline for ADL function and identify physical deficits which impact ability to perform ADLs (bathing, care of mouth/teeth, toileting, grooming, dressing, etc )  - Assess/evaluate cause of self-care deficits   - Assess range of motion  - Assess patient's mobility; develop plan if impaired  - Assess patient's need for assistive devices and provide as appropriate  - Encourage maximum independence but intervene and supervise when necessary  - Involve family in performance of ADLs  - Assess for home care needs following discharge   - Consider OT consult to assist with ADL evaluation and planning for discharge  - Provide patient education as appropriate  Outcome: Progressing  Goal: Maintains/Returns to pre admission functional level  Description: INTERVENTIONS:  - Perform BMAT or MOVE assessment daily    - Set and communicate daily mobility goal to care team and patient/family/caregiver  - Collaborate with rehabilitation services on mobility goals if consulted  - Perform Range of Motion 3 times a day  - Reposition patient every 2 hours    - Dangle patient 3 times a day  - Stand patient 3 times a day  - Ambulate patient 3 times a day  - Out of bed to chair 3 times a day   - Out of bed for meals 3 times a day  - Out of bed for toileting  - Record patient progress and toleration of activity level   Outcome: Progressing  Goal: Patient will remain free of falls  Description: INTERVENTIONS:  - Educate patient/family on patient safety including physical limitations  - Instruct patient to call for assistance with activity   - Consult OT/PT to assist with strengthening/mobility   - Keep Call bell within reach  - Keep bed low and locked with side rails adjusted as appropriate  - Keep care items and personal belongings within reach  - Initiate and maintain comfort rounds  - Make Fall Risk Sign visible to staff  - Offer Toileting every 2 Hours, in advance of need  - Initiate/Maintain bed alarm  - Obtain necessary fall risk management equipment: bed alarm  - Apply yellow socks and bracelet for high fall risk patients  - Consider moving patient to room near nurses station  Outcome: Progressing     Problem: DISCHARGE PLANNING  Goal: Discharge to home or other facility with appropriate resources  Description: INTERVENTIONS:  - Identify barriers to discharge w/patient and caregiver  - Arrange for needed discharge resources and transportation as appropriate  - Identify discharge learning needs (meds, wound care, etc )  - Arrange for interpretive services to assist at discharge as needed  - Refer to Case Management Department for coordinating discharge planning if the patient needs post-hospital services based on physician/advanced practitioner order or complex needs related to functional status, cognitive ability, or social support system  Outcome: Progressing     Problem: Knowledge Deficit  Goal: Patient/family/caregiver demonstrates understanding of disease process, treatment plan, medications, and discharge instructions  Description: Complete learning assessment and assess knowledge base    Interventions:  - Provide teaching at level of understanding  - Provide teaching via preferred learning methods  Outcome: Progressing

## 2022-11-16 NOTE — RESPIRATORY THERAPY NOTE
RT Ventilator Management Note  Marguerite Kelley 61 y o  male MRN: [id number]  Unit/Bed#: [facility] 517-01 Encounter: [id number]      Daily Screen         11/11/2022  0813 11/16/2022  1530          Patient safety screen outcome[de-identified] Passed Failed      Not Ready for Weaning due to[de-identified] -- Underline problem not resolved      Spont breathing trial % for 30 min: Yes --                Physical Exam:   Assessment Type: Assess only      Resp Comments: (P) Pt from OR, pt placed on vent  BS clear, will continue to monitor pt

## 2022-11-16 NOTE — PLAN OF CARE
Problem: PHYSICAL THERAPY ADULT  Goal: Performs mobility at highest level of function for planned discharge setting  See evaluation for individualized goals  Description: Treatment/Interventions: ADL retraining, Functional transfer training, LE strengthening/ROM, Endurance training, Patient/family training, Equipment eval/education, Bed mobility, Gait training, Spoke to nursing, Spoke to case management          See flowsheet documentation for full assessment, interventions and recommendations  Outcome: Progressing  Note: Prognosis: Good  Problem List: Decreased strength, Decreased endurance, Impaired balance, Decreased mobility, Pain  Assessment: Pt presents to therapy today with reduced mobility, poor endurance, risk of falling, pain, O2 dependent, fatigue  These impairments limit the patient by requiring rest breaks and PLB  Pt would benefit from continued skilled therapy while in the hospital to improve overall mobility and work towards a safe d/c  Recommend rehab  At end of session patient was left seated with call bell within reach  Pt is motivated today and willing to get out of bed to chair  Pt given HEP  The patient's AM-PAC Basic Mobility Inpatient Short Form Raw Score is 12  A Raw score of less than or equal to 16 suggests the patient may benefit from discharge to post-acute rehabilitation services  Please also refer to the recommendation of the Physical Therapist for safe discharge planning  PT Discharge Recommendation: Post acute rehabilitation services    See flowsheet documentation for full assessment

## 2022-11-17 ENCOUNTER — ANESTHESIA EVENT (INPATIENT)
Dept: PERIOP | Facility: HOSPITAL | Age: 57
End: 2022-11-17

## 2022-11-17 ENCOUNTER — ANESTHESIA (INPATIENT)
Dept: PERIOP | Facility: HOSPITAL | Age: 57
End: 2022-11-17

## 2022-11-17 LAB
ABO GROUP BLD BPU: NORMAL
ALBUMIN SERPL BCP-MCNC: 2 G/DL (ref 3.5–5)
ALP SERPL-CCNC: 158 U/L (ref 46–116)
ALT SERPL W P-5'-P-CCNC: 18 U/L (ref 12–78)
ANION GAP SERPL CALCULATED.3IONS-SCNC: 6 MMOL/L (ref 4–13)
ANION GAP SERPL CALCULATED.3IONS-SCNC: 6 MMOL/L (ref 4–13)
ANION GAP SERPL CALCULATED.3IONS-SCNC: 7 MMOL/L (ref 4–13)
AST SERPL W P-5'-P-CCNC: 38 U/L (ref 5–45)
BACTERIA UR QL AUTO: ABNORMAL /HPF
BASE EXCESS BLDA CALC-SCNC: -3.5 MMOL/L
BASE EXCESS BLDA CALC-SCNC: -4.4 MMOL/L
BASOPHILS # BLD AUTO: 0.05 THOUSANDS/ÂΜL (ref 0–0.1)
BASOPHILS NFR BLD AUTO: 0 % (ref 0–1)
BILIRUB DIRECT SERPL-MCNC: 1.65 MG/DL (ref 0–0.2)
BILIRUB SERPL-MCNC: 2.2 MG/DL (ref 0.2–1)
BILIRUB UR QL STRIP: ABNORMAL
BPU ID: NORMAL
BUN SERPL-MCNC: 38 MG/DL (ref 5–25)
BUN SERPL-MCNC: 44 MG/DL (ref 5–25)
BUN SERPL-MCNC: 52 MG/DL (ref 5–25)
CA-I BLD-SCNC: 0.94 MMOL/L (ref 1.12–1.32)
CA-I BLD-SCNC: 1 MMOL/L (ref 1.12–1.32)
CALCIUM SERPL-MCNC: 7.6 MG/DL (ref 8.3–10.1)
CALCIUM SERPL-MCNC: 7.7 MG/DL (ref 8.3–10.1)
CALCIUM SERPL-MCNC: 7.8 MG/DL (ref 8.3–10.1)
CHLORIDE SERPL-SCNC: 110 MMOL/L (ref 96–108)
CHLORIDE SERPL-SCNC: 111 MMOL/L (ref 96–108)
CHLORIDE SERPL-SCNC: 112 MMOL/L (ref 96–108)
CLARITY UR: ABNORMAL
CO2 SERPL-SCNC: 22 MMOL/L (ref 21–32)
CO2 SERPL-SCNC: 23 MMOL/L (ref 21–32)
CO2 SERPL-SCNC: 23 MMOL/L (ref 21–32)
COLOR UR: YELLOW
CREAT SERPL-MCNC: 1.88 MG/DL (ref 0.6–1.3)
CREAT SERPL-MCNC: 2.17 MG/DL (ref 0.6–1.3)
CREAT SERPL-MCNC: 2.63 MG/DL (ref 0.6–1.3)
CROSSMATCH: NORMAL
EOSINOPHIL # BLD AUTO: 0 THOUSAND/ÂΜL (ref 0–0.61)
EOSINOPHIL NFR BLD AUTO: 0 % (ref 0–6)
ERYTHROCYTE [DISTWIDTH] IN BLOOD BY AUTOMATED COUNT: 16 % (ref 11.6–15.1)
ERYTHROCYTE [DISTWIDTH] IN BLOOD BY AUTOMATED COUNT: 16.1 % (ref 11.6–15.1)
GFR SERPL CREATININE-BSD FRML MDRD: 25 ML/MIN/1.73SQ M
GFR SERPL CREATININE-BSD FRML MDRD: 32 ML/MIN/1.73SQ M
GFR SERPL CREATININE-BSD FRML MDRD: 38 ML/MIN/1.73SQ M
GLUCOSE SERPL-MCNC: 195 MG/DL (ref 65–140)
GLUCOSE SERPL-MCNC: 197 MG/DL (ref 65–140)
GLUCOSE SERPL-MCNC: 199 MG/DL (ref 65–140)
GLUCOSE SERPL-MCNC: 217 MG/DL (ref 65–140)
GLUCOSE SERPL-MCNC: 218 MG/DL (ref 65–140)
GLUCOSE SERPL-MCNC: 220 MG/DL (ref 65–140)
GLUCOSE UR STRIP-MCNC: ABNORMAL MG/DL
GRAN CASTS #/AREA URNS LPF: ABNORMAL /[LPF]
HCO3 BLDA-SCNC: 21.6 MMOL/L (ref 22–28)
HCO3 BLDA-SCNC: 21.6 MMOL/L (ref 22–28)
HCT VFR BLD AUTO: 25.4 % (ref 36.5–49.3)
HCT VFR BLD AUTO: 27.2 % (ref 36.5–49.3)
HGB BLD-MCNC: 8.4 G/DL (ref 12–17)
HGB BLD-MCNC: 8.8 G/DL (ref 12–17)
HGB UR QL STRIP.AUTO: ABNORMAL
HOROWITZ INDEX BLDA+IHG-RTO: 40 MM[HG]
HOROWITZ INDEX BLDA+IHG-RTO: 60 MM[HG]
HYALINE CASTS #/AREA URNS LPF: ABNORMAL /LPF
IMM GRANULOCYTES # BLD AUTO: 0.35 THOUSAND/UL (ref 0–0.2)
IMM GRANULOCYTES NFR BLD AUTO: 2 % (ref 0–2)
KETONES UR STRIP-MCNC: NEGATIVE MG/DL
LACTATE SERPL-SCNC: 1.5 MMOL/L (ref 0.5–2)
LACTATE SERPL-SCNC: 1.6 MMOL/L (ref 0.5–2)
LEUKOCYTE ESTERASE UR QL STRIP: NEGATIVE
LYMPHOCYTES # BLD AUTO: 1.07 THOUSANDS/ÂΜL (ref 0.6–4.47)
LYMPHOCYTES NFR BLD AUTO: 5 % (ref 14–44)
MAGNESIUM SERPL-MCNC: 2.2 MG/DL (ref 1.6–2.6)
MAGNESIUM SERPL-MCNC: 2.3 MG/DL (ref 1.6–2.6)
MCH RBC QN AUTO: 29 PG (ref 26.8–34.3)
MCH RBC QN AUTO: 30.2 PG (ref 26.8–34.3)
MCHC RBC AUTO-ENTMCNC: 32.4 G/DL (ref 31.4–37.4)
MCHC RBC AUTO-ENTMCNC: 33.1 G/DL (ref 31.4–37.4)
MCV RBC AUTO: 90 FL (ref 82–98)
MCV RBC AUTO: 91 FL (ref 82–98)
MONOCYTES # BLD AUTO: 1.71 THOUSAND/ÂΜL (ref 0.17–1.22)
MONOCYTES NFR BLD AUTO: 8 % (ref 4–12)
MUCOUS THREADS UR QL AUTO: ABNORMAL
NEUTROPHILS # BLD AUTO: 19.71 THOUSANDS/ÂΜL (ref 1.85–7.62)
NEUTS SEG NFR BLD AUTO: 85 % (ref 43–75)
NITRITE UR QL STRIP: NEGATIVE
NON-SQ EPI CELLS URNS QL MICRO: ABNORMAL /HPF
NRBC BLD AUTO-RTO: 0 /100 WBCS
O2 CT BLDA-SCNC: 12.7 ML/DL (ref 16–23)
O2 CT BLDA-SCNC: 13 ML/DL (ref 16–23)
OXYHGB MFR BLDA: 92.6 % (ref 94–97)
OXYHGB MFR BLDA: 96.9 % (ref 94–97)
PCO2 BLDA: 39.1 MM HG (ref 36–44)
PCO2 BLDA: 43.1 MM HG (ref 36–44)
PEEP RESPIRATORY: 8 CM[H2O]
PEEP RESPIRATORY: 8 CM[H2O]
PH BLDA: 7.32 [PH] (ref 7.35–7.45)
PH BLDA: 7.36 [PH] (ref 7.35–7.45)
PH UR STRIP.AUTO: 5 [PH]
PHOSPHATE SERPL-MCNC: 6.3 MG/DL (ref 2.7–4.5)
PHOSPHATE SERPL-MCNC: 7.3 MG/DL (ref 2.7–4.5)
PLATELET # BLD AUTO: 346 THOUSANDS/UL (ref 149–390)
PLATELET # BLD AUTO: 457 THOUSANDS/UL (ref 149–390)
PMV BLD AUTO: 10.3 FL (ref 8.9–12.7)
PMV BLD AUTO: 10.5 FL (ref 8.9–12.7)
PO2 BLDA: 113.3 MM HG (ref 75–129)
PO2 BLDA: 80.4 MM HG (ref 75–129)
POTASSIUM SERPL-SCNC: 4.8 MMOL/L (ref 3.5–5.3)
POTASSIUM SERPL-SCNC: 4.9 MMOL/L (ref 3.5–5.3)
POTASSIUM SERPL-SCNC: 5 MMOL/L (ref 3.5–5.3)
PROT SERPL-MCNC: 4.8 G/DL (ref 6.4–8.4)
PROT UR STRIP-MCNC: ABNORMAL MG/DL
RBC # BLD AUTO: 2.78 MILLION/UL (ref 3.88–5.62)
RBC # BLD AUTO: 3.03 MILLION/UL (ref 3.88–5.62)
RBC #/AREA URNS AUTO: ABNORMAL /HPF
SODIUM SERPL-SCNC: 139 MMOL/L (ref 135–147)
SODIUM SERPL-SCNC: 140 MMOL/L (ref 135–147)
SODIUM SERPL-SCNC: 141 MMOL/L (ref 135–147)
SP GR UR STRIP.AUTO: 1.05 (ref 1–1.03)
SPECIMEN SOURCE: ABNORMAL
SPECIMEN SOURCE: ABNORMAL
UNIT DISPENSE STATUS: NORMAL
UNIT PRODUCT CODE: NORMAL
UNIT PRODUCT VOLUME: 350 ML
UNIT RH: NORMAL
UROBILINOGEN UR STRIP-ACNC: <2 MG/DL
VENT AC: 14
VENT AC: 14
VENT- AC: AC
VENT- AC: AC
VT SETTING VENT: 450 ML
VT SETTING VENT: 450 ML
WBC # BLD AUTO: 22.72 THOUSAND/UL (ref 4.31–10.16)
WBC # BLD AUTO: 22.89 THOUSAND/UL (ref 4.31–10.16)
WBC #/AREA URNS AUTO: ABNORMAL /HPF

## 2022-11-17 PROCEDURE — 0D1B0Z4 BYPASS ILEUM TO CUTANEOUS, OPEN APPROACH: ICD-10-PCS | Performed by: STUDENT IN AN ORGANIZED HEALTH CARE EDUCATION/TRAINING PROGRAM

## 2022-11-17 PROCEDURE — 0DBM0ZZ EXCISION OF DESCENDING COLON, OPEN APPROACH: ICD-10-PCS | Performed by: STUDENT IN AN ORGANIZED HEALTH CARE EDUCATION/TRAINING PROGRAM

## 2022-11-17 RX ORDER — SODIUM CHLORIDE, SODIUM LACTATE, POTASSIUM CHLORIDE, CALCIUM CHLORIDE 600; 310; 30; 20 MG/100ML; MG/100ML; MG/100ML; MG/100ML
INJECTION, SOLUTION INTRAVENOUS CONTINUOUS PRN
Status: DISCONTINUED | OUTPATIENT
Start: 2022-11-17 | End: 2022-11-17

## 2022-11-17 RX ORDER — METRONIDAZOLE 500 MG/100ML
INJECTION, SOLUTION INTRAVENOUS CONTINUOUS PRN
Status: DISCONTINUED | OUTPATIENT
Start: 2022-11-17 | End: 2022-11-17

## 2022-11-17 RX ORDER — CALCIUM GLUCONATE 20 MG/ML
2 INJECTION, SOLUTION INTRAVENOUS ONCE
Status: COMPLETED | OUTPATIENT
Start: 2022-11-17 | End: 2022-11-17

## 2022-11-17 RX ORDER — SODIUM CHLORIDE, SODIUM GLUCONATE, SODIUM ACETATE, POTASSIUM CHLORIDE, MAGNESIUM CHLORIDE, SODIUM PHOSPHATE, DIBASIC, AND POTASSIUM PHOSPHATE .53; .5; .37; .037; .03; .012; .00082 G/100ML; G/100ML; G/100ML; G/100ML; G/100ML; G/100ML; G/100ML
1000 INJECTION, SOLUTION INTRAVENOUS ONCE
Status: COMPLETED | OUTPATIENT
Start: 2022-11-17 | End: 2022-11-17

## 2022-11-17 RX ORDER — INSULIN GLARGINE 100 [IU]/ML
15 INJECTION, SOLUTION SUBCUTANEOUS
Status: DISCONTINUED | OUTPATIENT
Start: 2022-11-17 | End: 2022-11-22

## 2022-11-17 RX ORDER — CEFAZOLIN SODIUM 1 G/3ML
INJECTION, POWDER, FOR SOLUTION INTRAMUSCULAR; INTRAVENOUS AS NEEDED
Status: DISCONTINUED | OUTPATIENT
Start: 2022-11-17 | End: 2022-11-17

## 2022-11-17 RX ORDER — HYDROMORPHONE HYDROCHLORIDE 2 MG/ML
INJECTION, SOLUTION INTRAMUSCULAR; INTRAVENOUS; SUBCUTANEOUS AS NEEDED
Status: DISCONTINUED | OUTPATIENT
Start: 2022-11-17 | End: 2022-11-17

## 2022-11-17 RX ORDER — ALBUMIN (HUMAN) 12.5 G/50ML
25 SOLUTION INTRAVENOUS ONCE
Status: COMPLETED | OUTPATIENT
Start: 2022-11-17 | End: 2022-11-17

## 2022-11-17 RX ORDER — MAGNESIUM HYDROXIDE 1200 MG/15ML
LIQUID ORAL AS NEEDED
Status: DISCONTINUED | OUTPATIENT
Start: 2022-11-17 | End: 2022-11-17 | Stop reason: HOSPADM

## 2022-11-17 RX ORDER — ONDANSETRON 2 MG/ML
INJECTION INTRAMUSCULAR; INTRAVENOUS AS NEEDED
Status: DISCONTINUED | OUTPATIENT
Start: 2022-11-17 | End: 2022-11-17

## 2022-11-17 RX ORDER — SODIUM CHLORIDE, SODIUM GLUCONATE, SODIUM ACETATE, POTASSIUM CHLORIDE, MAGNESIUM CHLORIDE, SODIUM PHOSPHATE, DIBASIC, AND POTASSIUM PHOSPHATE .53; .5; .37; .037; .03; .012; .00082 G/100ML; G/100ML; G/100ML; G/100ML; G/100ML; G/100ML; G/100ML
100 INJECTION, SOLUTION INTRAVENOUS CONTINUOUS
Status: DISCONTINUED | OUTPATIENT
Start: 2022-11-17 | End: 2022-11-20

## 2022-11-17 RX ORDER — ROCURONIUM BROMIDE 10 MG/ML
INJECTION, SOLUTION INTRAVENOUS AS NEEDED
Status: DISCONTINUED | OUTPATIENT
Start: 2022-11-17 | End: 2022-11-17

## 2022-11-17 RX ORDER — HYDROMORPHONE HCL/PF 1 MG/ML
SYRINGE (ML) INJECTION AS NEEDED
Status: DISCONTINUED | OUTPATIENT
Start: 2022-11-17 | End: 2022-11-17

## 2022-11-17 RX ADMIN — CALCIUM GLUCONATE 3 G: 98 INJECTION, SOLUTION INTRAVENOUS at 19:00

## 2022-11-17 RX ADMIN — CHLORHEXIDINE GLUCONATE 15 ML: 1.2 SOLUTION ORAL at 08:08

## 2022-11-17 RX ADMIN — ALBUMIN (HUMAN) 25 G: 0.25 INJECTION, SOLUTION INTRAVENOUS at 18:05

## 2022-11-17 RX ADMIN — SODIUM CHLORIDE, SODIUM LACTATE, POTASSIUM CHLORIDE, AND CALCIUM CHLORIDE: 600; 310; 30; 20 INJECTION, SOLUTION INTRAVENOUS at 12:43

## 2022-11-17 RX ADMIN — ROCURONIUM BROMIDE 20 MG: 10 SOLUTION INTRAVENOUS at 14:26

## 2022-11-17 RX ADMIN — INSULIN LISPRO 4 UNITS: 100 INJECTION, SOLUTION INTRAVENOUS; SUBCUTANEOUS at 11:53

## 2022-11-17 RX ADMIN — INSULIN GLARGINE 15 UNITS: 100 INJECTION, SOLUTION SUBCUTANEOUS at 23:51

## 2022-11-17 RX ADMIN — HYDROMORPHONE HYDROCHLORIDE 2 MG: 2 INJECTION, SOLUTION INTRAMUSCULAR; INTRAVENOUS; SUBCUTANEOUS at 13:00

## 2022-11-17 RX ADMIN — SODIUM CHLORIDE, SODIUM GLUCONATE, SODIUM ACETATE, POTASSIUM CHLORIDE, MAGNESIUM CHLORIDE, SODIUM PHOSPHATE, DIBASIC, AND POTASSIUM PHOSPHATE 1000 ML: .53; .5; .37; .037; .03; .012; .00082 INJECTION, SOLUTION INTRAVENOUS at 10:00

## 2022-11-17 RX ADMIN — PROPOFOL 15 MCG/KG/MIN: 10 INJECTION, EMULSION INTRAVENOUS at 10:07

## 2022-11-17 RX ADMIN — HEPARIN SODIUM 5000 UNITS: 5000 INJECTION INTRAVENOUS; SUBCUTANEOUS at 05:21

## 2022-11-17 RX ADMIN — CALCIUM GLUCONATE 2 G: 20 INJECTION, SOLUTION INTRAVENOUS at 08:08

## 2022-11-17 RX ADMIN — ALBUMIN (HUMAN) 25 G: 12.5 INJECTION, SOLUTION INTRAVENOUS at 00:00

## 2022-11-17 RX ADMIN — CEFAZOLIN 2000 MG: 1 INJECTION, POWDER, FOR SOLUTION INTRAMUSCULAR; INTRAVENOUS at 12:58

## 2022-11-17 RX ADMIN — METRONIDAZOLE: 500 INJECTION, SOLUTION INTRAVENOUS at 12:52

## 2022-11-17 RX ADMIN — PROPOFOL 30 MCG/KG/MIN: 10 INJECTION, EMULSION INTRAVENOUS at 05:53

## 2022-11-17 RX ADMIN — PANTOPRAZOLE SODIUM 40 MG: 40 INJECTION, POWDER, FOR SOLUTION INTRAVENOUS at 08:08

## 2022-11-17 RX ADMIN — PROPOFOL 15 MCG/KG/MIN: 10 INJECTION, EMULSION INTRAVENOUS at 17:00

## 2022-11-17 RX ADMIN — ROCURONIUM BROMIDE 30 MG: 10 SOLUTION INTRAVENOUS at 13:20

## 2022-11-17 RX ADMIN — CHLORHEXIDINE GLUCONATE 15 ML: 1.2 SOLUTION ORAL at 23:50

## 2022-11-17 RX ADMIN — Medication 100 MCG/HR: at 17:00

## 2022-11-17 RX ADMIN — INSULIN LISPRO 4 UNITS: 100 INJECTION, SOLUTION INTRAVENOUS; SUBCUTANEOUS at 17:14

## 2022-11-17 RX ADMIN — SODIUM CHLORIDE, SODIUM GLUCONATE, SODIUM ACETATE, POTASSIUM CHLORIDE AND MAGNESIUM CHLORIDE 75 ML/HR: 526; 502; 368; 37; 30 INJECTION, SOLUTION INTRAVENOUS at 16:15

## 2022-11-17 RX ADMIN — SODIUM CHLORIDE, SODIUM GLUCONATE, SODIUM ACETATE, POTASSIUM CHLORIDE, MAGNESIUM CHLORIDE, SODIUM PHOSPHATE, DIBASIC, AND POTASSIUM PHOSPHATE 1000 ML: .53; .5; .37; .037; .03; .012; .00082 INJECTION, SOLUTION INTRAVENOUS at 16:15

## 2022-11-17 RX ADMIN — INSULIN LISPRO 2 UNITS: 100 INJECTION, SOLUTION INTRAVENOUS; SUBCUTANEOUS at 05:21

## 2022-11-17 RX ADMIN — Medication 100 MCG/HR: at 02:53

## 2022-11-17 RX ADMIN — HYDROMORPHONE HYDROCHLORIDE 1 MG: 1 INJECTION, SOLUTION INTRAMUSCULAR; INTRAVENOUS; SUBCUTANEOUS at 15:27

## 2022-11-17 RX ADMIN — PROPOFOL 20 MCG/KG/MIN: 10 INJECTION, EMULSION INTRAVENOUS at 01:16

## 2022-11-17 RX ADMIN — SODIUM CHLORIDE, SODIUM LACTATE, POTASSIUM CHLORIDE, AND CALCIUM CHLORIDE: 600; 310; 30; 20 INJECTION, SOLUTION INTRAVENOUS at 14:36

## 2022-11-17 RX ADMIN — HEPARIN SODIUM 5000 UNITS: 5000 INJECTION INTRAVENOUS; SUBCUTANEOUS at 23:50

## 2022-11-17 RX ADMIN — ERTAPENEM SODIUM 1000 MG: 1 INJECTION, POWDER, LYOPHILIZED, FOR SOLUTION INTRAMUSCULAR; INTRAVENOUS at 18:00

## 2022-11-17 RX ADMIN — ROCURONIUM BROMIDE 50 MG: 10 SOLUTION INTRAVENOUS at 12:44

## 2022-11-17 NOTE — ANESTHESIA PREPROCEDURE EVALUATION
Procedure:  LAPAROTOMY EXPLORATORY W/ BOWEL RESECTION, possible ileostomy (Abdomen)  60yo M metastatic colon ca POD9 s/p exploratory laparotomy, transverse colon resection, reversal of loop colostomy, peritoneal biopsy, and segment 3 hepatic resection w/ a post-operative course complicated by a LUQ hematoma requiring blood transfusion and hypoxic respiratory failure w/ recurrent encephalopathy requiring endotracheal intubation      Patient failed extubation attempt yesterday and remaind intubated overnight 11/16    To have possible small bowel resection and second-look    Vent settings: 450/14/60%/8  Relevant Problems   CARDIO   (+) Benign essential hypertension   (+) MR (mitral regurgitation)   (+) Mixed hyperlipidemia      ENDO   (+) Type 2 diabetes mellitus with hyperlipidemia (HCC)   (+) Type 2 diabetes mellitus without complication, with long-term current use of insulin (HCC)      GI/HEPATIC   (+) Colon cancer metastasized to liver (HCC)   (+) Malignant neoplasm of transverse colon (HCC)      HEMATOLOGY   (+) Iron deficiency anemia, unspecified   (+) Microcytic anemia      PULMONARY   (+) Acute respiratory failure with hypoxia (HCC)      Recent labs personally reviewed:            Latest Reference Range & Units 11/17/22 04:23   Sodium 135 - 147 mmol/L 140   Potassium 3 5 - 5 3 mmol/L 4 8   Chloride 96 - 108 mmol/L 111 (H)   CO2 21 - 32 mmol/L 23   Anion Gap 4 - 13 mmol/L 6   BUN 5 - 25 mg/dL 44 (H)   Creatinine 0 60 - 1 30 mg/dL 2 17 (H)   Glucose, Random 65 - 140 mg/dL 220 (H)   Calcium 8 3 - 10 1 mg/dL 7 7 (L)   AST 5 - 45 U/L 38   ALT 12 - 78 U/L 18   Alkaline Phosphatase 46 - 116 U/L 158 (H)   Total Protein 6 4 - 8 4 g/dL 4 8 (L)   Albumin 3 5 - 5 0 g/dL 2 0 (L)   TOTAL BILIRUBIN 0 20 - 1 00 mg/dL 2 20 (H)   eGFR ml/min/1 73sq m 32   Phosphorus 2 7 - 4 5 mg/dL 7 3 (H)   Magnesium 1 6 - 2 6 mg/dL 2 3   BILIRUBIN DIRECT 0 00 - 0 20 mg/dL 1 65 (H)   (H): Data is abnormally high  (L): Data is abnormally low         Latest Reference Range & Units 11/17/22 04:23   WBC 4 31 - 10 16 Thousand/uL 22 89 (H)   Red Blood Cell Count 3 88 - 5 62 Million/uL 2 78 (L)   Hemoglobin 12 0 - 17 0 g/dL 8 4 (L)   HCT 36 5 - 49 3 % 25 4 (L)   MCV 82 - 98 fL 91   MCH 26 8 - 34 3 pg 30 2   MCHC 31 4 - 37 4 g/dL 33 1   RDW 11 6 - 15 1 % 16 0 (H)   Platelet Count 254 - 390 Thousands/uL 346   MPV 8 9 - 12 7 fL 10 3   nRBC /100 WBCs 0   (H): Data is abnormally high  (L): Data is abnormally low      Type and Screen:  O    TTE 11/16    Interpretation Summary       •  Left Ventricle: Left ventricular cavity size is normal  Wall thickness is moderately increased  There is moderate concentric hypertrophy  The left ventricular ejection fraction is 55%  Systolic function is normal  Wall motion is normal   •  Mitral Valve: There is mild annular calcification  There is trace regurgitation  •  Tricuspid Valve: There is mild regurgitation  Physical Exam    Airway    Mallampati score: III  TM Distance: >3 FB  Neck ROM: full     Dental   No notable dental hx     Cardiovascular  Cardiovascular exam normal    Pulmonary  Comment: 6l NRB satting 94, RR 20, Pulmonary exam normal Decreased breath sounds,     Other Findings        Anesthesia Plan  ASA Score- 4     Anesthesia Type- general with ASA Monitors  Additional Monitors: arterial line  Airway Plan: ETT  Comment: Post-op ventilation to allow for slower weaning trial in ICU  Plan Factors-Exercise tolerance (METS): <4 METS  Chart reviewed  EKG reviewed  Imaging results reviewed  Existing labs reviewed  Patient summary reviewed  Patient is not a current smoker  Induction- intravenous  Postoperative Plan-     Informed Consent- Anesthetic plan and risks discussed with spouse (phone consent with spouse)  I personally reviewed this patient with the CRNA  Discussed and agreed on the Anesthesia Plan with the CRNA  Maggie Gurrola

## 2022-11-17 NOTE — RESPIRATORY THERAPY NOTE
RT Ventilator Management Note  Tayla Hines 61 y o  male MRN: [id number]  Unit/Bed#: [facility] 616-34 Encounter: [id number]      Daily Screen         11/16/2022  1530 11/17/2022  0819          Patient safety screen outcome[de-identified] Failed Failed      Not Ready for Weaning due to[de-identified] Underline problem not resolved Underline problem not resolved                Physical Exam:   Assessment Type: (P) Assess only  General Appearance: (P) Sedated  Respiratory Pattern: (P) Assisted  Chest Assessment: (P) Chest expansion symmetrical  Bilateral Breath Sounds: (P) Diminished  Suction: (P) ET Tube  O2 Device: (P) vent      Resp Comments: (P) Pt for possible OR today  No weaning at this time  Resting comfortably on current vent settings

## 2022-11-17 NOTE — NUTRITION
11/17/22 1416   Recommendations/Interventions   Recommendations to Provider 1  Initiate nutrition support via most appropriate route as medically able s/p OR today  2  If medically able to start EN and pt will return to similar propofol dose, suggest Vital High Protein with goal of 45mL/hr plus 1 oz of Prosource TID  Start at 15mL/hr and advance by 10mL/hr q8 hrs or as tolerated  At goal, this provides 1260 kcals (1540 total kcals with most recent propofol dose), 139 g protein, and 1083mL free water  Additional free water flushes or IV fluids per provider discretion   3  Monitor renal function if nutrition support initiated and adjust protein provision prn

## 2022-11-17 NOTE — OP NOTE
OPERATIVE REPORT  PATIENT NAME: Tammy Burnett    :  1965  MRN: [id number]  Pt Location: BE OR ROOM 04    SURGERY DATE: 2022    Surgeon(s) and Role:     * Estuardo Rodriguez MD - Primary     * Donna Mcdaniel MD - Assisting    Preop Diagnosis:  Malignant neoplasm of transverse colon ([facility] [address] ) [C18 4]    Post-Op Diagnosis Codes:     * Malignant neoplasm of transverse colon ([address] ) [C18 4]    Procedure(s) (LRB):  LAPAROTOMY EXPLORATORY W/ BOWEL RESECTION (N/A)  ILEOSTOMY (N/A)  APPLICATION VAC DRESSING ABDOMEN/TRUNK (N/A)    Specimen(s):  ID Type Source Tests Collected by Time Destination   1 : DESCENDING COLON  Tissue Colon TISSUE EXAM Estuardo Rodriguez MD 2022 1328        Estimated Blood Loss:   Minimal    Drains:  Closed/Suction Drain Left Abdomen Bulb 19 Fr  (Active)   Site Description Healing 22 1200   Dressing Status Open to air 22 1200   Drainage Appearance Serosanguineous 22 1200   Status To bulb suction 22 1200   Output (mL) 20 mL 22 1200   Number of days: 1       Closed/Suction Drain Lateral;Right Abdomen (Active)   Site Description Healing 22 1200   Dressing Status Open to air 22 1200   Drainage Appearance Serosanguineous 22 1200   Status To bulb suction 22 1200   Output (mL) 10 mL 22 1200   Number of days: 1       NG/OG/Enteral Tube Nasogastric (Active)   Placement Reverification Auscultation 22 1200   Site Assessment Clean;Dry; Intact 22 1200   Status Suction-low continuous 22 1200   Drainage Appearance Bile;Green 22 1200   Intake (mL) 0 mL 22 0800   Output (mL) 0 mL 22 1200   Number of days: 1       Ileostomy LUQ (Active)   Stomal Appliance 1 piece 22 1453   Number of days: 0       Urethral Catheter 16 Fr   (Active)   Amt returned on insertion(mL) 525 mL 11/15/22 1101   Reasons to continue Urinary Catheter  Accurate I&O assessment in critically ill patients (48 hr  max) 22 0800 Goal for Removal Voiding trial when ambulation improves 11/16/22 1951   Site Assessment Clean;Skin intact 11/17/22 1200   Negro Care Done 11/16/22 2038   Collection Container Standard drainage bag 11/17/22 1200   Securement Method Securing device (Describe) 11/17/22 1200   Securing Device Change Date 11/22/22 11/15/22 1101   Output (mL) 65 mL 11/17/22 1200   Number of days: 2       Anesthesia Type:   General    Operative Indications:  Malignant neoplasm of transverse colon [facility]) [C18 4]    Patient required take back to the operating room yesterday after CT scan revealed a moderate-size leak at the transverse colon anastomosis  In the operating room, the right colon appeared massively dilated to about 12 cm with dusky serosa  A right colon resection was performed including the anastomosis and the patient was taken back today for 2nd look  Operative Findings: All the bowel appeared healthy  A jc terminal ileum to left colon anastomosis was performed with diverting loop ileostomy  Complications:   None    Procedure and Technique:  Anesthesia was deepened and the VAC was removed  A time-out was performed  The abdomen is explored in all 4 quadrants  The previously placed left upper quadrant drains and right upper quadrant drains terminating in the left and right gutters as well as posterior to the omentum and the lesser sac at the site of anastomotic leak were examined and found to be intact  The full length of the small bowel was inspected as well as the previously significantly dilated sigmoid colon  All appeared healthy with the exception of the last cm of the left colon staple line  This was resected with an additional fire of the linear stapler and the mesentery was tied off  The staple line appeared very healthy  A standard side-to-side anastomosis was then performed between the terminal ileum and the left colon  Care was taken to ensure that the mesentery was not twisted    This was performed by single fire of the 60 mm blue load and the common enterotomy was sewn closed in 2 layers with 3-0 Vicryl followed by 3-0 silk Lembert sutures  The staple line was also oversewn as well as the staple lines at the end of both loops of bowel  Copious irrigation was undertaken and the effluent returned clear  The drains were left in their place  Approximately 10-15 cm proximal to the anastomosis a loop of ileum was selected for diverting loop ileostomy  A 2 cm region of skin and subcu tissue cut subcutaneous tissue was removed  The anterior fascia was then divided with a cruciate incision and the muscle carefully  with retraction  The posterior fascia was incised sharply with a cruciate incision until the appropriate size was obtained at two fingerbreadths wide  The selected loop of ileum was then brought through this defect and brooked in 4 quadrants  Again the abdomen was irrigated and found to be hemostatic and the effluent returned clear  The fascia was closed with 1  PDS  The incision was then covered with a towel and the diverting loop ileostomy was opened with a semicircle incision at the inferior margin of the bowel  Digital inspection revealed that both loops of bowel were intact with easy passage through the fascia  The previously brooked sutures were then tied down with seromuscular suture  The remaining mucocutaneous junction was completed with interrupted 4-0 Vicryl  Finally, a wound VAC was placed over the skin at the midline  The patient tolerated the procedure well was transferred back to the ICU in stable condition  I was present for the entire procedure    Patient Disposition:  intubated and hemodynamically stable        SIGNATURE: Ayanna Smalls MD  DATE: November 17, 2022  TIME: 4:06 PM

## 2022-11-17 NOTE — UTILIZATION REVIEW
Continued Stay Review    Date: 11/17/22                          Current Patient Class: IP  Current Level of Care: Critical Care    HPI:57 y o  male initially admitted on 11/7 for colon cancer w/ mets to liver  POD9 s/p transverse colectomy and resection of L lateral segment liver met  Developed a left upper quadrant hematoma and worsening clinical status  On repeat imaging, suggestion of leak from transverse colon anastomosis  He was taken back to the operating room for exploration  11/16/22 Surgery  Procedure: LAPAROTOMY EXPLORATORY W/ BOWEL RESECTION- VAC PLACEMENT (N/A)  Indication: Malignant neoplasm of transverse colon ([address] ) [C18 4]; 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) [C79 9, C22 8]  Anesthesia: General  Findings: Left upper quadrant infected hematoma  Defect in the transverse colon anastomosis with extravasation of succus  Massively dilated cecum requiring resection  Received 2 units pRBCs        Assessment/Plan: Hypotensive overnight, given 25 g 5% albumin and 25 g 25% albumin w/ improvement  Increased fentanyl gtt to reduce propofol needs  Remains intubated  On exam, diaphoretic, decreased breath sounds, rales, decreased bowel sounds, abdominal distention, b/l LE 2+ edema, lethargic, GCS 9  Abthera w/ small amount of serosanguinous output, IVCTORINO x2 w/ serosanguinous output  Plan: maintain port, a-line, rivreo, drains, NGT, vent; frequent neuro checks, fentanyl and propofol gtt, cardio-pulm monitoring, MAP > 65, wean vent as able, I&O, supportive care, Trend labs, replete electrolytes as needed; NPO, accuchecks w/ SSI; IV ABX, follow new cultures, reposition frequently       Vital Signs:   Date/Time Temp Pulse Resp BP MAP (mmHg) Arterial Line BP MAP SpO2 O2 Device   11/17/22 1200 98 8 °F (37 1 °C) 98 9 Abnormal  116/61 82 130/51 73 mmHg 94 % Ventilator   11/17/22 1100 98 6 °F (37 °C) 92 16 116/58 81 132/56 80 mmHg 97 % --   11/17/22 1000 98 8 °F (37 1 °C) 96 14 105/57 75 115/54 74 mmHg 95 % --   11/17/22 0900 98 8 °F (37 1 °C) 96 16 104/58 75 117/54 74 mmHg 96 % Ventilator   11/17/22 0819 -- 93 18 -- -- -- -- 96 % --   11/17/22 0800 98 8 °F (37 1 °C) 93 14 105/59 76 103/52 69 mmHg 95 % --   11/17/22 0600 99 °F (37 2 °C) 93 15 108/59 78 108/54 72 mmHg 97 % --   11/17/22 0500 99 1 °F (37 3 °C) 94 15 102/59 75 102/51 69 mmHg 96 % --   11/17/22 0408 -- -- -- -- -- -- -- 97 % --   11/17/22 0400 99 3 °F (37 4 °C) 96 16 103/57 74 104/53 70 mmHg 96 % --   11/17/22 0323 99 3 °F (37 4 °C) -- -- -- -- -- -- -- --   11/17/22 0300 99 3 °F (37 4 °C) 94 18 103/58 75 102/53 70 mmHg 95 % --   11/17/22 0200 99 5 °F (37 5 °C) 96 18 104/61 78 108/54 72 mmHg 96 % --   11/17/22 0100 99 7 °F (37 6 °C) 97 19 99/59 73 98/51 67 mmHg 95 % --   11/17/22 0000 100 4 °F (38 °C) 104 21 97/59 72 91/52 66 mmHg 95 % --   11/16/22 2301 -- -- -- -- -- -- -- 96 % --   11/16/22 2300 100 9 °F (38 3 °C)   Abnormal  107 Abnormal  19 90/57 69 81/52 63 mmHg Abnormal  95 % --   11/16/22 2200 100 9 °F (38 3 °C)   Abnormal  107 Abnormal  19 87/58 Abnormal  67 86/50 63 mmHg Abnormal  94 % --   11/16/22 2100 101 1 °F (38 4 °C)   Abnormal  107 Abnormal  17 -- -- 84/46 58 mmHg Abnormal  95 % --   11/16/22 2000 100 9 °F (38 3 °C)   Abnormal  104 18 -- -- 101/49 65 mmHg 97 % --   11/16/22 1941 100 6 °F (38 1 °C)   Abnormal  102 18 -- -- 102/49 64 mmHg Abnormal  97 % --   11/16/22 1900 100 2 °F (37 9 °C) 101 16 -- -- 101/50 65 mmHg 98 % --   11/16/22 1845 100 °F (37 8 °C) 101 16 -- -- 106/52 67 mmHg 98 % Ventilator       Pertinent Labs/Diagnostic Results:   Results from last 7 days   Lab Units 11/17/22  0423 11/16/22  2328 11/16/22  1546 11/16/22  1343 11/16/22  0430   WBC Thousand/uL 22 89* 29 65* 25 84*  --  21 07*   HEMOGLOBIN g/dL 8 4* 10 1* 10 7*  --  7 3*   I STAT HEMOGLOBIN g/dl  --   --   --  8 5*  --    HEMATOCRIT % 25 4* 31 0* 33 9*  --  22 5*   HEMATOCRIT, ISTAT %  --   --   --  25*  --    PLATELETS Thousands/uL 346 452* 454*  --  340 NEUTROS ABS Thousands/µL 19 71* 25 84*  --   --  17 46*   BANDS PCT %  --   --  9*  --   --          Results from last 7 days   Lab Units 11/17/22 0423 11/16/22  2328 11/16/22  1546 11/16/22  1343 11/16/22  0430 11/15/22  0517 11/14/22  0545 11/13/22  0442   SODIUM mmol/L 140 141 143  --  140 143 143 142   POTASSIUM mmol/L 4 8 5 0 4 2  --  3 9 3 7 3 6 3 7   CHLORIDE mmol/L 111* 112* 113*  --  109* 110* 111* 112*   CO2 mmol/L 23 22 22  --  25 26 25 26   CO2, I-STAT mmol/L  --   --   --  27  --   --   --   --    ANION GAP mmol/L 6 7 8  --  6 7 7 4   BUN mg/dL 44* 38* 31*  --  29* 31* 35* 45*   CREATININE mg/dL 2 17* 1 88* 1 33*  --  1 25 1 30 1 26 1 48*   EGFR ml/min/1 73sq m 32 38 58  --  63 60 62 51   CALCIUM mg/dL 7 7* 7 6* 7 5*  --  8 3 8 1* 8 2* 8 1*   CALCIUM, IONIZED mmol/L 1 00*  --  1 02*  --   --   --  1 09* 1 09*   CALCIUM, IONIZED, ISTAT mmol/L  --   --   --  1 09*  --   --   --   --    MAGNESIUM mg/dL 2 3 2 2 2 2  --   --  2 4 2 4 2 5   PHOSPHORUS mg/dL 7 3* 6 3* 5 0*  --   --  3 9 3 9 4 2     Results from last 7 days   Lab Units 11/17/22  0423 11/16/22  1546 11/14/22  0545 11/13/22  0442 11/12/22  0604   AST U/L 38 40 49* 69* 65*   ALT U/L 18 23 46 61 62   ALK PHOS U/L 158* 220* 228* 265* 304*   TOTAL PROTEIN g/dL 4 8* 4 9* 5 5* 5 6* 5 4*   ALBUMIN g/dL 2 0* 1 4* 1 7* 1 8* 1 8*   TOTAL BILIRUBIN mg/dL 2 20* 1 53* 1 18* 1 08* 1 33*   BILIRUBIN DIRECT mg/dL 1 65*  --  0 68*  --  0 72*   AMMONIA umol/L  --   --   --   --  16     Results from last 7 days   Lab Units 11/17/22  1150 11/17/22  0512 11/16/22  2328 11/16/22  1732 11/16/22  1114 11/16/22  0824 11/15/22  2137 11/15/22  1725 11/15/22  0730 11/14/22  2052 11/14/22  1603 11/14/22  1224   POC GLUCOSE mg/dl 217* 197* 165* 141* 166* 172* 180* 257* 203* 239* 294* 288*     Results from last 7 days   Lab Units 11/17/22  0423 11/16/22  2328 11/16/22  1546 11/16/22  0430 11/15/22  0517 11/14/22  0545 11/13/22  0442 11/12/22  0604 11/11/22  0430   GLUCOSE RANDOM mg/dL 220* 195* 177* 185* 179* 192* 197* 214* 156*     Results from last 7 days   Lab Units 11/17/22  0448 11/16/22  1547 11/14/22  0545 11/14/22  0005   PH ART  7 361 7 359 7 494* 7 416   PCO2 ART mm Hg 39 1 40 2 31 9* 42 6   PO2 ART mm Hg 113 3 130 4* 79 1 114 3   HCO3 ART mmol/L 21 6* 22 1 24 0 26 8   BASE EXC ART mmol/L -3 5 -3 1 0 8 2 0   O2 CONTENT ART mL/dL 12 7* 15 6* 10 1* 11 1*   O2 HGB, ARTERIAL % 96 9 96 9 93 2* 96 3   ABG SOURCE  Line, Arterial Line, Arterial Line, Arterial Line, Arterial   NON VENT TYPE HFNC   --   --  HFNC Flow HFNC Flow   HFNC FLOW LPM   --   --  55 55     Results from last 7 days   Lab Units 11/16/22  0430   PH RUMA  7 420*   PCO2 RUMA mm Hg 40 1*   PO2 RUMA mm Hg 39 0   HCO3 RUMA mmol/L 25 4   BASE EXC RUMA mmol/L 0 9   O2 CONTENT RUMA ml/dL 7 9   O2 HGB, VENOUS % 69 6     Results from last 7 days   Lab Units 11/16/22  1343   I STAT BASE EXC mmol/L 1   I STAT O2 SAT % 99*   ISTAT PH ART  7 399   I STAT ART PCO2 mm HG 41 2   I STAT ART PO2 mm  0*   I STAT ART HCO3 mmol/L 25 5     Results from last 7 days   Lab Units 11/12/22  0604   PROTIME seconds 14 4   INR  1 10     Results from last 7 days   Lab Units 11/16/22  2328 11/16/22  1546 11/16/22  0430   LACTIC ACID mmol/L 1 6 1 4 1 6     Results from last 7 days   Lab Units 11/16/22  2357   CLARITY UA  Turbid   COLOR UA  Yellow   SPEC GRAV UA  1 047*   PH UA  5 0   GLUCOSE UA mg/dl Trace*   KETONES UA mg/dl Negative   BLOOD UA  Trace*   PROTEIN UA mg/dl 100 (2+)*   NITRITE UA  Negative   BILIRUBIN UA  Small*   UROBILINOGEN UA (BE) mg/dl <2 0   LEUKOCYTES UA  Negative   WBC UA /hpf 4-10*   RBC UA /hpf 4-10*   BACTERIA UA /hpf Moderate*   EPITHELIAL CELLS WET PREP /hpf Occasional   MUCUS THREADS  Occasional*     Results from last 7 days   Lab Units 11/17/22  0016 11/16/22  1315 11/16/22  1314   BLOOD CULTURE  Received in Microbiology Lab  Culture in Progress  Received in Microbiology Lab  Culture in Progress    --   --    Conor Nuñez STAIN RESULT   --  No Polys or Bacteria seen No Polys or Bacteria seen       Medications:   Scheduled Medications:  chlorhexidine, 15 mL, Mouth/Throat, Q12H SHANNON  ertapenem, 1,000 mg, Intravenous, Q24H  heparin (porcine), 5,000 Units, Subcutaneous, Q8H SHANNON  insulin glargine, 15 Units, Subcutaneous, HS  insulin lispro, 2-12 Units, Subcutaneous, Q6H SHANNON  pantoprazole, 40 mg, Intravenous, Q24H [address]    Continuous IV Infusions:  fentaNYL, 100 mcg/hr, Intravenous, Continuous  propofol, 5-50 mcg/kg/min, Intravenous, Titrated    PRN Meds:  acetaminophen, 650 mg, Rectal, Q4H PRN  albumin 25 %, 25 g, Intravenous; 11/16 x1  albumin 5 %, 12 5 g, Intravenous; 11/17 x1  calcium gluconate, 2 g, Intravenous; 11/16 x1, 11/17 x1  fentanyl citrate (PF), 50 mcg, Intravenous, Q1H PRN  multi-electrolyte, 1,000 mL, bolus, Intravenous; 11/17 x1  ondansetron, 4 mg, Intravenous, Q4H PRN        Discharge Plan: [facility]    Network Utilization Review Department  ATTENTION: Please call with any questions or concerns to [phone] and carefully listen to the prompts so that you are directed to the right person  All voicemails are confidential   Suresh Petit all requests for admission clinical reviews, approved or denied determinations and any other requests to dedicated fax number below belonging to the campus where the patient is receiving treatment   List of dedicated fax numbers for the Facilities:  [address] DENIALS (Administrative/Medical Necessity) [phone]   [address] [place] [address] (Maternity/NICU/Pediatrics) [phone]   [address]  [phone]   [address] [facility] 310 [phone]   [address] [phone]

## 2022-11-17 NOTE — QUICK NOTE
Post op check: Surgical Oncology    Assessment   Patient is a 61 y o  male with metastatic colon cancer 11/7 ex-lap, segment 3 hepatic resxn, transverse colectomy, reversal of loop colostomy  11/11 extubated-SCC  11/16 Ex lap, right hemicolectomy, left discontinuity, Abthera  Vent:  14, 450, 8, 60%    Plan:  -intubated and sedated   -IV fluids   -NPO/NG-tube; monitor output in character   -ABThera in place; monitor output in character   -VICTORINO x2; monitor output in character   -antibiotics   -DVT prophylaxis    Subjective:  Patient intubated and sedated    Objective:  Vitals:    11/16/22 2300   BP: 90/57   Pulse: (!) 107   Resp: 19   Temp: (!) 100 9 °F (38 3 °C)   SpO2: 95%       I/O this shift: In: 470 1 [I V :190 1;  IV Piggyback:280]  Out: 695 [Urine:95; Emesis/NG output:100; Drains:500]    Scheduled Meds:  Current Facility-Administered Medications   Medication Dose Route Frequency Provider Last Rate   • acetaminophen  650 mg Rectal Q4H PRN Ya Lunch, DO     • chlorhexidine  15 mL Mouth/Throat Q12H [address] GAURANG Diaz     • ertapenem  1,000 mg Intravenous Q24H Chau Guzmania, DO 1,000 mg (11/16/22 1900)   • fentaNYL  100 mcg/hr Intravenous Continuous Ya Lunch,  mcg/hr (11/16/22 2322)   • fentanyl citrate (PF)  50 mcg Intravenous Q1H PRN GAURANG Diaz     • heparin (porcine)  5,000 Units Subcutaneous Pending sale to [facility] GAURANG Diaz     • insulin lispro  2-12 Units Subcutaneous Q6H [address] Aster Pardo MD     • labetalol  10 mg Intravenous Q4H PRN GAURANG Diaz     • ondansetron  4 mg Intravenous Q4H PRN GAURANG Diaz     • pantoprazole  40 mg Intravenous Q24H [address] Aster Pardo MD     • propofol  5-50 mcg/kg/min Intravenous Titrated Aster Pardo MD 20 mcg/kg/min (11/16/22 2317)     Continuous Infusions:fentaNYL, 100 mcg/hr, Last Rate: 100 mcg/hr (11/16/22 3645)  propofol, 5-50 mcg/kg/min, Last Rate: 20 mcg/kg/min (11/16/22 8259)      PRN Meds: •  acetaminophen  •  fentanyl citrate (PF)  •  labetalol  •  ondansetron      Physical Exam   Gen:  Intubated and sedated  CV:  Tachycardic  Lungs:  Mechanically ventilated  Abd: soft, diffusely tender, distended, with wound VAC and 2 VICTORINO drains in place  Neuro:  AxO x3      Feliciano Toledo MD

## 2022-11-17 NOTE — PROGRESS NOTES
Daily Progress Note - Critical Care   Neeraj Hair 61 y o  male MRN: [id number]  Unit/Bed#: [facility] 517-01 Encounter: [id number]        ----------------------------------------------------------------------------------------  HPI/24hr events:   · Hypotension overnight, given 25g 5% albumin and 25g 25% albumin with improvement   · Increased fentanyl infusion rate in order to reduce propofol needs     ---------------------------------------------------------------------------------------  SUBJECTIVE  Patient unable to provide verbal complaints    Review of Systems   Unable to perform ROS: Intubated     Review of systems was unable to be performed secondary to intubation/sedation   ---------------------------------------------------------------------------------------  Assessment and Plan  Principal Problem:    Colon cancer metastasized to liver [facility])  Active Problems:    Benign essential hypertension    Type 2 diabetes mellitus with hyperlipidemia (HCC)    Malignant neoplasm of transverse colon (HCC)    Encephalopathy    Flash pulmonary edema (HCC)    MR (mitral regurgitation)    Bacteremia    ESBL (extended spectrum beta-lactamase) producing bacteria infection    Acute respiratory failure with hypoxia ([address] )  Resolved Problems:    * No resolved hospital problems  *      Neuro:   · Acute toxic/metabolic encephalopathy  ? Related to continuous sedation and critical illness  ? Frequent neuro checks     ? Sleep/wake cycle regulation as able   § Melatonin 6mg qHS when able to utilize PO   ? CAM-ICU BID  · Sedation/analgesia   ? Continuous infusions:   §  Fentanyl 100 mcg/hr  § Propofol 30 mcg/kg/min  § Titrate to goal RASS 0 to -1  ? Scheduled medication  § None  ?  PRN medications  § Fentanyl 50mcg q1h PRN  § 0 doses / 24 hours   § Tylenol rectal 650mg q4h PRN  § 0 doses / 24 hours       CV:   • Paroxysmal atrial tachycardia   o Continue telemetry monitoring  o Optimize electrolytes: K > 4 0, Mag > 2 0 to prevent further arrhythmia   • Hypotension   o Transient, likely related to sedation/possibly component of intravascular hypovolemia, SIRS   o S/p 25g 5% albumin and 25g 25% albumin with improvement   o Continue arterial line monitoring   - Maintain MAP >65  o Additional volume resuscitation as indicated  o Holding home antihypertensives:   - Amlodipine 5mg BID  - Lisinopril 40mg daily    • Hyperlipidemia  o Holding home atorvastatin 40mg qHS while NPO      Pulm:  • Acute hypoxic respiratory failure   o Mechanical ventilation day # 2  o AC/VC: 14/450/8/60%  - Maintain VT 6-8ml/kg IBW: 68 4kg  - Maintain SpO2 >90%  o Continue PEEP 8 at least until following OR to help with recruitment   o CXR as needed   o Suction as needed and closely monitor secretions  Maintain HOB >30 degrees  Q4h oral care with chlorhexidine BID  o Monitor peak and plateau airway pressures   Maintain Ppeak < 45cm H2O, Pplat < 30cm H2O      GI:   • Colon cancer with liver metastasis, s/p remote diverting ostomy   o POD #9 s/p /p ex-lap, transverse colonic resection, reversal of loop colostomy, and hepatic resection, complicated by development of anastomotic hematoma  o POD #1 s/p ex-lap right hemicolectomy, resection of cecum for anastomotic leak, left in discontinuity with Abthera VAC, VICTORINO x2   o Plan for OR today with white surgery   o Monitor drain output amount and character   o Maintain NGT   • Stress ulcer prophylaxis  o Protonix IV q24h   - On home oral PPI   • Bowel regimen  o None       :   • Acute kidney injury on CKD   o Multifactorial secondary to ATN, sepsis, possible RANDY  o Baseline creatinine: 1 22 - 1 39  o Admission creatinine: 1 73  o Peak creatinine: 2 33  o Current creatinine: 2 17  o Strict q2h I/O monitoring  o Maintain rivero catheter  o Continue to provide resuscitation and supportive care  o Continue to follow renal function tests    F/E/N:   • Fluids  o Maintenance fluids: None  o Diuresis plan: None  • Electrolytes   o Replete electrolytes with as needed to maintain K >4 0, Mag >2 0, Phos >3 0  • Nutrition  o NPO    Heme/Onc:   • Acute blood loss anemia superimposed on chronic anemia   o Baseline hemoglobin: 7 5 - 10 6  o Admission hemoglobin: 10 1  o Current hemoglobin: 8 4  - 11/16/22 2u PRBC   - 11/13/22 1u PRBC]  - 11/8/22 2u PRBC  o Transfuse for hemoglobin <7 0  • VTE prophylaxis:  SQH TID, SCD's to BLE    Endo/Rheum:   • Insulin-dependent DM2  o Last hemoglobin A1c 8 0% on 9/26/22  o Continue q6h sliding scale algorithm   o Add lantus 15 units qHS  o Consider insulin drip given ongoing infection and still NPO  o Adjust insulin regimen as needed to maintain goal -180  o Endocrinology consulted       ID:   • ESBL E coli bacteremia  o Suspected colonic translocation   o Completed 8 days Ertapenem  o Worsened clinical exam with evidence of anastomotic leak, left on ertapenem with additional of ancef/flagyl intraoperatively  o Current Antibiotics: Ertapenem   - Antibiotic day # 9  o Continue to monitor fever and WBC curve   o Follow up ID recommendations   o New Cultures:  - 11/17/22 BCXx2: PENDING   - 11/16/22 Tissue culture: PENDING       MSK/Skin:   • Physical deconditioning   o Reposition q2h, eliminate pressure points while in bed  o Close skin surveillance   o PT/OT when appropriate      Disposition: Continue Critical Care   Patient appropriate for transfer out of the ICU today?: No  Code Status: Level 1 - Full Code  ---------------------------------------------------------------------------------------  Invasive Devices Review  Invasive Devices     Central Venous Catheter Line  Duration           Port A Cath 03/10/22 Right Chest 251 days          Peripheral Intravenous Line  Duration           Peripheral IV 11/13/22 Right;Upper;Ventral (anterior) Arm 4 days    Peripheral IV 11/14/22 Right;Ventral (anterior) Forearm 2 days    Peripheral IV 11/16/22 Dorsal (posterior); Right Hand 1 day    Peripheral IV 11/16/22 Left Antecubital <1 day          Arterial Line  Duration           Arterial Line 22 Right Radial <1 day          Drain  Duration           Urethral Catheter 16 Fr  1 day    Closed/Suction Drain Lateral;Right Abdomen <1 day    Closed/Suction Drain Left Abdomen Bulb 19 Fr  <1 day    NG/OG/Enteral Tube Nasogastric <1 day          Airway  Duration           ETT  Cuffed; Hi-Lo 8 mm <1 day              Can any invasive devices be discontinued today? No  ---------------------------------------------------------------------------------------  OBJECTIVE    Vitals   Vitals:    22 0400 22 0408 22 0500 22 0600   BP: 103/57  102/59 108/59   Pulse: 96  94 93   Resp: 16  15 15   Temp: 99 3 °F (37 4 °C)  99 1 °F (37 3 °C) 99 °F (37 2 °C)   TempSrc:       SpO2: 96% 97% 96% 97%   Weight:       Height:         Temp (24hrs), Av 8 °F (37 7 °C), Min:99 °F (37 2 °C), Max:101 1 °F (38 4 °C)  Current: Temperature: 99 °F (37 2 °C)    I    Physical Exam  Vitals reviewed  Constitutional:       General: He is not in acute distress  Appearance: Normal appearance  He is diaphoretic  Interventions: He is sedated, intubated and restrained  HENT:      Head: Normocephalic and atraumatic  Mouth/Throat:      Mouth: Mucous membranes are moist    Eyes:      Conjunctiva/sclera: Conjunctivae normal       Pupils: Pupils are equal, round, and reactive to light  Cardiovascular:      Rate and Rhythm: Normal rate and regular rhythm  Pulses:           Radial pulses are 2+ on the right side  Dorsalis pedis pulses are 2+ on the right side and 2+ on the left side  Heart sounds: Normal heart sounds  Pulmonary:      Effort: Pulmonary effort is normal  He is intubated  Breath sounds: Examination of the right-lower field reveals decreased breath sounds  Examination of the left-lower field reveals decreased breath sounds  Decreased breath sounds and rales present  No wheezing or rhonchi  Abdominal:      General: Abdomen is protuberant  Bowel sounds are decreased  There is distension  Palpations: Abdomen is soft  Tenderness: There is no abdominal tenderness  Genitourinary:     Comments: Negro: dark, diane urine   Musculoskeletal:      Right lower le+ Pitting Edema present  Left lower le+ Pitting Edema present  Skin:     General: Skin is warm and moist       Capillary Refill: Capillary refill takes less than 2 seconds  Neurological:      General: No focal deficit present  Mental Status: He is lethargic  GCS: GCS eye subscore is 2  GCS verbal subscore is 1  GCS motor subscore is 6  Motor: Motor function is intact  Laboratory and Diagnostics:  Results from last 7 days   Lab Units 22  0423 22  2328 22  1546 22  1343 22  0430 11/15/22  0517 22  0545 22  1230 22  0442 22  0604 22  0430   WBC Thousand/uL 22 89* 29 65* 25 84*  --  21 07* 17 03* 15 50* 16 15* 15 65*   < > 10 24*   HEMOGLOBIN g/dL 8 4* 10 1* 10 7*  --  7 3* 7 4* 7 6* 8 0* 6 7*   < > 7 4*   I STAT HEMOGLOBIN g/dl  --   --   --  8 5*  --   --   --   --   --   --   --    HEMATOCRIT % 25 4* 31 0* 33 9*  --  22 5* 22 6* 23 4* 24 0* 20 3*   < > 21 6*   HEMATOCRIT, ISTAT %  --   --   --  25*  --   --   --   --   --   --   --    PLATELETS Thousands/uL 346 452* 454*  --  340 288 249 230 243   < > 146*   NEUTROS PCT % 85* 88*  --   --  83*  --   --   --   --   --  84*   BANDS PCT %  --   --  9*  --   --   --   --   --   --   --   --    MONOS PCT % 8 6  --   --  6  --   --   --   --   --  9   MONO PCT %  --   --  0*  --   --   --   --   --  13*  --   --     < > = values in this interval not displayed       Results from last 7 days   Lab Units 22  0423 22  2328 22  1546 22  1343 22  0430 11/15/22  0517 22  0545 22  0442 22  0604 22  0430   SODIUM mmol/L 140 141 143  --  140 143 143 142 143 142   POTASSIUM mmol/L 4 8 5 0 4 2  --  3 9 3 7 3 6 3 7 3 9 4 0   CHLORIDE mmol/L 111* 112* 113*  --  109* 110* 111* 112* 111* 112*   CO2 mmol/L 23 22 22  --  25 26 25 26 23 23   CO2, I-STAT mmol/L  --   --   --  27  --   --   --   --   --   --    ANION GAP mmol/L 6 7 8  --  6 7 7 4 9 7   BUN mg/dL 44* 38* 31*  --  29* 31* 35* 45* 39* 40*   CREATININE mg/dL 2 17* 1 88* 1 33*  --  1 25 1 30 1 26 1 48* 1 59* 1 70*   CALCIUM mg/dL 7 7* 7 6* 7 5*  --  8 3 8 1* 8 2* 8 1* 8 2* 8 2*   GLUCOSE RANDOM mg/dL 220* 195* 177*  --  185* 179* 192* 197* 214* 156*   ALT U/L 18  --  23  --   --   --  46 61 62 86*   AST U/L 38  --  40  --   --   --  49* 69* 65* 68*   ALK PHOS U/L 158*  --  220*  --   --   --  228* 265* 304* 181*   ALBUMIN g/dL 2 0*  --  1 4*  --   --   --  1 7* 1 8* 1 8* 1 9*   TOTAL BILIRUBIN mg/dL 2 20*  --  1 53*  --   --   --  1 18* 1 08* 1 33* 1 01*     Results from last 7 days   Lab Units 11/17/22  0423 11/16/22 2328 11/16/22  1546 11/15/22  0517 11/14/22  0545 11/13/22  0442 11/12/22  0604   MAGNESIUM mg/dL 2 3 2 2 2 2 2 4 2 4 2 5 2 6   PHOSPHORUS mg/dL 7 3* 6 3* 5 0* 3 9 3 9 4 2 4 5      Results from last 7 days   Lab Units 11/12/22  0604   INR  1 10          Results from last 7 days   Lab Units 11/16/22 2328 11/16/22  1546 11/16/22  0430   LACTIC ACID mmol/L 1 6 1 4 1 6     ABG:  Results from last 7 days   Lab Units 11/17/22  0448   PH ART  7 361   PCO2 ART mm Hg 39 1   PO2 ART mm Hg 113 3   HCO3 ART mmol/L 21 6*   BASE EXC ART mmol/L -3 5   ABG SOURCE  Line, Arterial     VBG:  Results from last 7 days   Lab Units 11/17/22  0448 11/16/22  1547 11/16/22  0430   PH RUMA   --   --  7 420*   PCO2 RUMA mm Hg  --   --  40 1*   PO2 RUMA mm Hg  --   --  39 0   HCO3 RUMA mmol/L  --   --  25 4   BASE EXC RUMA mmol/L  --   --  0 9   ABG SOURCE  Line, Arterial   < >  --     < > = values in this interval not displayed             Micro  Results from last 7 days   Lab Units 11/16/22  1315 11/16/22  1314   GRAM STAIN RESULT  No Polys or Bacteria seen No Polys or Bacteria seen       EKG: NSR  Imaging:   I have personally reviewed pertinent reports  and I have personally reviewed pertinent films in PACS    Intake and Output  I/O       11/15 0701  11/16 0700 11/16 0701  11/17 0700    P  O  360     I V  (mL/kg)  3725 9 (31 6)    Blood  700    NG/GT  0    IV Piggyback  730    Total Intake(mL/kg) 360 (3 1) 5155 9 (43 7)    Urine (mL/kg/hr) 1680 (0 6) 510 (0 2)    Emesis/NG output  250    Drains  1100    Stool 0     Blood  400    Total Output 1680 2260    Net -1320 +2895 9          Unmeasured Stool Occurrence 2 x           Height and Weights   Height: 5' 8" (172 7 cm)  IBW (Ideal Body Weight): 68 4 kg  Body mass index is 39 45 kg/m²  Weight (last 2 days)     Date/Time Weight    11/16/22 0400 118 (259 48)            Nutrition       Diet Orders   (From admission, onward)             Start     Ordered    11/16/22 0823  Diet NPO  Diet effective now        References:    Nutrtion Support Algorithm Enteral vs  Parenteral   Question Answer Comment   Diet Type NPO    RD to adjust diet per protocol?  No        11/16/22 9041                  Active Medications  Scheduled Meds:  Current Facility-Administered Medications   Medication Dose Route Frequency Provider Last Rate   • acetaminophen  650 mg Rectal Q4H PRN Ochoa Matas, DO     • calcium gluconate  2 g Intravenous Once Julio Cesar Fuentes MD     • chlorhexidine  15 mL Mouth/Throat Q12H [address] GAURANG Chopra     • ertapenem  1,000 mg Intravenous Q24H Vernel Prost [place], DO 1,000 mg (11/16/22 1900)   • fentaNYL  100 mcg/hr Intravenous Continuous Ochao Matas,  mcg/hr (11/17/22 0253)   • fentanyl citrate (PF)  50 mcg Intravenous Q1H PRN GAURANG Chopra     • heparin (porcine)  5,000 Units Subcutaneous [facility] GAURANG Chopra     • insulin lispro  2-12 Units Subcutaneous Q6H [address] Mariano Atkinson MD     • labetalol  10 mg Intravenous Q4H PRN GAURANG Saeed     • ondansetron  4 mg Intravenous Q4H PRN GAURANG Saeed     • pantoprazole  40 mg Intravenous Q24H [address] Kraig Olea MD     • propofol  5-50 mcg/kg/min Intravenous Titrated Kraig Olea MD 30 mcg/kg/min (11/17/22 0553)     Continuous Infusions:  fentaNYL, 100 mcg/hr, Last Rate: 100 mcg/hr (11/17/22 0253)  propofol, 5-50 mcg/kg/min, Last Rate: 30 mcg/kg/min (11/17/22 0553)      PRN Meds:   acetaminophen, 650 mg, Q4H PRN  fentanyl citrate (PF), 50 mcg, Q1H PRN  labetalol, 10 mg, Q4H PRN  ondansetron, 4 mg, Q4H PRN        Allergies   Allergies   Allergen Reactions   • Shellfish-Derived Products - Food Allergy Anaphylaxis   • Erythromycin GI Intolerance     ---------------------------------------------------------------------------------------  Advance Directive and Living Will:      Power of :    POLST:    ---------------------------------------------------------------------------------------  Care Time Delivered:   Upon my evaluation, this patient had a high probability of imminent or life-threatening deterioration due to ongoing managment of sepsis, hypotension without shock, acute hypoxic resp failure on vent, sedation , which required my direct attention, intervention, and personal management  I have personally provided 35 minutes ([phone]) of critical care time, exclusive of procedures, teaching, family meetings, and any prior time recorded by providers other than myself  Meribeth Motts, CRNP        Portions of the record may have been created with voice recognition software  Occasional wrong word or "sound a like" substitutions may have occurred due to the inherent limitations of voice recognition software    Read the chart carefully and recognize, using context, where substitutions have occurred

## 2022-11-17 NOTE — QUICK NOTE
Postoperative evaluation:    79-year-old male with colon cancer, now status post exploratory laparotomy, transverse colon resection, transverse loop colostomy and segment 3 hepatic resection, complicated anastomotic leak now status post ex lap right hemicolectomy was left in discontinuity overnight on 11/16 now status post take back partial bowel resection ileostomy fascial closure and subcutaneous VAC placement on 11/17        Patient was left intubated and sedated  VAC is in place suction and seal  VICTORINO drains x2 both serosanguineous  Abdomen is soft, nondistended    Plan:   Wean to extubate tomorrow  Monitor urine output overnight  Appreciate ICU level of care  Maintain wound VAC and VICTORINO drains and monitor output in character of output  DVT prophylaxis      Tarah Durant MD  5:45 PM  11/17/22

## 2022-11-17 NOTE — PROGRESS NOTES
Progress Note - General Surgery  Sydell Form 61 y o  male MRN: [id number]  Unit/Bed#: [facility] 517-01 Encounter: [id number]      Assessment:  60yo M with metastatic colon cancer now s/p exploratory laparotomy, transverse colon resection, reversal of loop colostomy, peritoneal biopsy, and segment 3 hepatic resection w/ a post-operative course complicated by a LUQ hematoma requiring blood transfusion and hypoxic respiratory failure w/ recurrent encephalopathy requiring endotracheal intubation (now extubated), c/b anastomotic leak  S/p: Ex lap, right hemicolectomy, left discontinuity, Abthera on 11/16  Afebrile, VSS on 3-4  L nasal cannula  UOP:  470  Cr: 2 17 from 1 88  WBC: 22 89 from 29 65  H/H: 8 4 from 10 1  7 36/39 1/113/21 6/-3 5      Plan:  OR today for second look  Appreciate ICU level of care  Continue abx  Maintain abthera, lyric x2; rivero and NG monitor output    Subjective/Objective     Subjective/Events:  Pt taken back to OR due to leak seen on CTAP  Left in discontinuity with abthera; no intubated and sedated; marginal UOP overnight  Received 2 units prbc and 200 of albumin overnight      Objective:     Blood pressure 108/59, pulse 93, temperature 99 °F (37 2 °C), resp  rate 15, height 5' 8" (1 727 m), weight 118 kg (259 lb 7 7 oz), SpO2 97 %  ,Body mass index is 39 45 kg/m²  Intake/Output Summary (Last 24 hours) at 11/17/2022 0647  Last data filed at 11/17/2022 0600  Gross per 24 hour   Intake 5155 88 ml   Output 2260 ml   Net 2895 88 ml     Physical Exam  Vitals reviewed  Constitutional:       Interventions: He is sedated, intubated and restrained  HENT:      Head: Normocephalic and atraumatic  Cardiovascular:      Rate and Rhythm: Normal rate  Pulmonary:      Effort: He is intubated  Abdominal:      General: There is no distension  Palpations: Abdomen is soft  Comments: abthera vac in place  LYRIC x2 in place   Musculoskeletal:      Right lower leg: No edema        Left lower leg: No edema  Skin:     General: Skin is warm  Invasive Devices     Central Venous Catheter Line  Duration           Port A Cath 03/10/22 Right Chest 251 days          Peripheral Intravenous Line  Duration           Peripheral IV 11/13/22 Right;Upper;Ventral (anterior) Arm 4 days    Peripheral IV 11/14/22 Right;Ventral (anterior) Forearm 2 days    Peripheral IV 11/16/22 Dorsal (posterior); Right Hand 1 day    Peripheral IV 11/16/22 Left Antecubital <1 day          Arterial Line  Duration           Arterial Line 11/16/22 Right Radial <1 day          Drain  Duration           Urethral Catheter 16 Fr  1 day    Closed/Suction Drain Lateral;Right Abdomen <1 day    Closed/Suction Drain Left Abdomen Bulb 19 Fr  <1 day    NG/OG/Enteral Tube Nasogastric <1 day          Airway  Duration           ETT  Cuffed; Hi-Lo 8 mm <1 day                I have personally reviewed pertinent lab results    , CBC:   Lab Results   Component Value Date    WBC 22 89 (H) 11/17/2022    HGB 8 4 (L) 11/17/2022    HCT 25 4 (L) 11/17/2022    MCV 91 11/17/2022     11/17/2022    MCH 30 2 11/17/2022    MCHC 33 1 11/17/2022    RDW 16 0 (H) 11/17/2022    MPV 10 3 11/17/2022    NRBC 0 11/17/2022   , CMP:   Lab Results   Component Value Date    SODIUM 140 11/17/2022    K 4 8 11/17/2022     (H) 11/17/2022    CO2 23 11/17/2022    CO2 27 11/16/2022    BUN 44 (H) 11/17/2022    CREATININE 2 17 (H) 11/17/2022    GLUCOSE 151 (H) 11/16/2022    CALCIUM 7 7 (L) 11/17/2022    AST 38 11/17/2022    ALT 18 11/17/2022    ALKPHOS 158 (H) 11/17/2022    EGFR 32 11/17/2022   , Coagulation: No results found for: PT, INR, APTT

## 2022-11-17 NOTE — ANESTHESIA POSTPROCEDURE EVALUATION
Post-Op Assessment Note             Reason for prolonged intubation > 24 hours:  Respiratory insufficiency (preop satting 94 on 6l nrb, desat post intubation)Reason for prolonged intubation > 48 hours:  Respiratory failure (preop satting 94 on 6l nrb, desat post intubation)      No notable events documented

## 2022-11-17 NOTE — ANESTHESIA POSTPROCEDURE EVALUATION
Post-Op Assessment Note    CV Status:  Stable  Pain Score: 0    Pain management: adequate     Mental Status:  Sleepy   Hydration Status:  Stable   PONV Controlled:  None   Airway Patency:  Patent  Airway: intubated      Post Op Vitals Reviewed: Yes      Staff: CRNA         No notable events documented      BP      Temp      Pulse     Resp      SpO2       please refer to 15:49 ICU nursing vitals

## 2022-11-17 NOTE — PROGRESS NOTES
Progress Note - Infectious Disease   Satish Qiu 61 y o  male MRN: [id number]  Unit/Bed#: [facility] 517-01 Encounter: [id number]      Impression/Recommendations:  1  Sepsis, secondary to intra-abdominal infection and bacteremia   Patient is clinically much improved on antibiotic and status post I&D/bowel resection  Temperature up overnight in early postop period, but down again today  WBC trending down  Antibiotic plan as in below  Monitor temperature/WBC  Monitor hemodynamics      2  ESBL producing E coli bacteremia, likely secondary to gut translocation during recent colon surgery   Patient is clinically improved   Bacteremia cleared  Patient completed antibiotic course for this but given probable anastomotic leak, will continue antibiotic for now  No further antibiotic needed for this      3  Anastomotic leak up with intra-abdominal abscess/infected hematoma  Patient is status post exploratory laparotomy, with drainage of abscess and cecum resection  Continue IV ertapenem for now  Follow-up on operative culture      4  Metastatic colon cancer, with liver metastases   Patient is status post transverse colon resection  Surgical oncology follow-up      5  ALEXY, superimposed on CKD   Creatinine was improving but now worsening postop  Antibiotic at full dose for now  Monitor creatinine  If creatinine continues to rise, will need to decrease antibiotic dose      6  Acute hypoxic respiratory failure, previously on ventilator   Patient was extubated but now intubated again postop  No clinical pneumonia  Monitor respiratory status      7  Encephalopathy, multifactorial   Mental status was improved but now sedated on ventilator  Monitor mental status      8  Type 2 DM      Discussed with Critical Care surgery Service      Antibiotics:  Ertapenem # 9  POD # 1     Subjective:  Patient is currently intubated, in ICU  Temperature up overnight, now down    He is tolerating antibiotic well   No nausea, vomiting or diarrhea      Objective:  Vitals:  Temp:  [98 6 °F (37 °C)-101 1 °F (38 4 °C)] 98 8 °F (37 1 °C)  HR:  [] 98  Resp:  [9-24] 9  BP: ()/(57-76) 116/61  SpO2:  [94 %-100 %] 94 %  Temp (24hrs), Av 6 °F (37 6 °C), Min:98 6 °F (37 °C), Max:101 1 °F (38 4 °C)  Current: Temperature: 98 8 °F (37 1 °C)    Physical Exam:     General: Sedated on ventilator  Attempts open eyes to verbal stimuli  Comfortable  Nontoxic  Neck:  Supple  No mass  No lymphadenopathy  Lungs: Expansion symmetric, no rales, no wheezing, respirations unlabored  Heart:  Regular rate and rhythm, S1 and S2 normal, no murmur  Abdomen: Soft, moderate distention  Wound with dressing in place  No purulence  Drain serosanguinous  Decreased bowel sounds  Extremities: No edema  No erythema/warmth  No ulcer  Nontender to palpation  Skin:  No rash  Neuro: Not assessable     Invasive Devices     Central Venous Catheter Line  Duration           Port A Cath 03/10/22 Right Chest 252 days          Peripheral Intravenous Line  Duration           Peripheral IV 22 Right;Upper;Ventral (anterior) Arm 4 days    Peripheral IV 22 Right;Ventral (anterior) Forearm 2 days    Peripheral IV 22 Dorsal (posterior); Right Hand 1 day    Peripheral IV 22 Left Antecubital <1 day          Arterial Line  Duration           Arterial Line 22 Right Radial <1 day          Drain  Duration           Urethral Catheter 16 Fr  2 days    Closed/Suction Drain Lateral;Right Abdomen <1 day    Closed/Suction Drain Left Abdomen Bulb 19 Fr  <1 day    NG/OG/Enteral Tube Nasogastric <1 day          Airway  Duration           ETT  Cuffed; Hi-Lo 8 mm <1 day                Labs studies:   I have personally reviewed pertinent labs    Results from last 7 days   Lab Units 22  0423 22  2328 22  1546 22  1343 11/15/22  0517 22  0545   POTASSIUM mmol/L 4 8 5 0 4 2  --    < > 3 6   CHLORIDE mmol/L 111* 112* 113*  --    < > 111*   CO2 mmol/L 23 22 22  --    < > 25   CO2, I-STAT mmol/L  --   --   --  27  --   --    BUN mg/dL 44* 38* 31*  --    < > 35*   CREATININE mg/dL 2 17* 1 88* 1 33*  --    < > 1 26   EGFR ml/min/1 73sq m 32 38 58  --    < > 62   GLUCOSE, ISTAT mg/dl  --   --   --  151*  --   --    CALCIUM mg/dL 7 7* 7 6* 7 5*  --    < > 8 2*   AST U/L 38  --  40  --   --  49*   ALT U/L 18  --  23  --   --  46   ALK PHOS U/L 158*  --  220*  --   --  228*    < > = values in this interval not displayed  Results from last 7 days   Lab Units 11/17/22  0423 11/16/22  2328 11/16/22  1546   WBC Thousand/uL 22 89* 29 65* 25 84*   HEMOGLOBIN g/dL 8 4* 10 1* 10 7*   PLATELETS Thousands/uL 346 452* 454*     Results from last 7 days   Lab Units 11/17/22  0016 11/16/22  1315 11/16/22  1314   BLOOD CULTURE  Received in Microbiology Lab  Culture in Progress  Received in Microbiology Lab  Culture in Progress  --   --    GRAM STAIN RESULT   --  No Polys or Bacteria seen No Polys or Bacteria seen       Imaging Studies:   I have personally reviewed pertinent imaging study reports and images in PACS  EKG, Pathology, and Other Studies:   I have personally reviewed pertinent reports

## 2022-11-17 NOTE — PROGRESS NOTES
Critical Care Interval Progress Note - Post-Op Check     Eloise Fraction 61 y o  male MRN: [id number]  Unit/Bed#: [facility] 517-01 Encounter: [id number]    Subjective and Objective:  Patient is POD # 10 s/p ex-lap, transverse colonic resection, reversal of loop colostomy, and hepatic resection  Post-op course was complicated by LUQ hematoma, encephalopathy, and hypoxic respiratory failure requiring intubation  Patient was extubated on 22       POD #1 s/p ex-lap with right hemicolectomy/cecectomy for anastomotic leak, left in discontinuity with Abthera VAC and VICTORINO drain x2  Now POD #0 s/p ex-lap partial descending colon resection with primary anastomosis  Loop ileostomy  Fascial closure with skin VAC  Physical Exam  Vitals reviewed  Constitutional:       Interventions: He is sedated, intubated and restrained  HENT:      Head: Normocephalic and atraumatic  Mouth/Throat:      Mouth: Mucous membranes are moist    Eyes:      Pupils: Pupils are equal, round, and reactive to light  Cardiovascular:      Rate and Rhythm: Regular rhythm  Tachycardia present  Pulses:           Radial pulses are 2+ on the right side and 2+ on the left side  Dorsalis pedis pulses are 2+ on the right side and 2+ on the left side  Heart sounds: Normal heart sounds  Pulmonary:      Effort: Pulmonary effort is normal  He is intubated  Breath sounds: Examination of the right-lower field reveals decreased breath sounds  Decreased breath sounds present  Abdominal:      General: Abdomen is protuberant  There is distension  Palpations: Abdomen is soft  Tenderness: There is no abdominal tenderness  Comments: Midline VAC with serosang output  Right VICTORINO with large serosang output  Left VICTORINO with minimal serosang output     Genitourinary:     Comments: Negro: dark, diane urine   Musculoskeletal:      Right lower le+ Pitting Edema present  Left lower le+ Pitting Edema present     Skin: General: Skin is warm and dry  Capillary Refill: Capillary refill takes less than 2 seconds  Neurological:      General: No focal deficit present  Mental Status: He is lethargic  GCS: GCS eye subscore is 2  GCS verbal subscore is 1  GCS motor subscore is 6  Motor: Motor function is intact  Assessment and Plan:  Principal Problem:    Colon cancer metastasized to liver [facility])  Active Problems:    Benign essential hypertension    Type 2 diabetes mellitus with hyperlipidemia (HCC)    Malignant neoplasm of transverse colon (HCC)    Encephalopathy    Flash pulmonary edema (HCC)    MR (mitral regurgitation)    Bacteremia    ESBL (extended spectrum beta-lactamase) producing bacteria infection    Acute respiratory failure with hypoxia (HCC)  Resolved Problems:    * No resolved hospital problems  *      · Keep sedation as previous  · Returned on unchanged vent settings, adjust as needed based on SpO2 and ABG  · 1L isolyte now for minimal UOP pre- and intra-operatively   · Start isolyte 75ml/hr  · Further boluses with albumin 5% as indicated   · STAT ABG BMP, CBC, lactate     Eulice Capuchin, CRNP      Portions of the record may have been created with voice recognition software  Occasional wrong word or "sound a like" substitutions may have occurred due to the inherent limitations of voice recognition software    Read the chart carefully and recognize, using context, where substitutions have occurred

## 2022-11-17 NOTE — PROGRESS NOTES
Progress Note - Surgical Oncology  Cesar Anguiano 61 y o  male MRN: [id number]  Unit/Bed#: [facility] 517-01 Encounter: [id number]      Assessment:  60yo M POD8 s/p exploratory laparotomy, transverse colon resection, reversal of loop colostomy, peritoneal biopsy, and segment 3 hepatic resection w/ a post-operative course complicated by a LUQ hematoma requiring blood transfusion and hypoxic respiratory failure w/ recurrent encephalopathy requiring endotracheal intubation (now extubated), c/b anastomotic leak  S/p: Ex lap, right hemicolectomy, left discontinuity, Abthera on 11/17  Afebrile, VSS on 3-4  L nasal cannula  UOP:  470  Cr: 2 17 from 1 88  WBC: 22 89 from 29 65  H/H: 8 4 from 10 1  7 36/39 1/113/21 6/-3 5      Plan:  OR today for second look  Appreciate ICU level of care  Continue abx  Maintain abthera, lyric x2; rivero and NG monitor output    Subjective/Objective     Subjective/Events:  Pt taken back to OR due to leak seen on CTAP  Left in discontinuity with abthera; no intubated and sedated; marginal UOP overnight  Received 2 units prbc and 200 of albumin overnight      Objective:     Blood pressure 103/57, pulse 96, temperature 99 3 °F (37 4 °C), resp  rate 16, height 5' 8" (1 727 m), weight 118 kg (259 lb 7 7 oz), SpO2 97 %  ,Body mass index is 39 45 kg/m²  Intake/Output Summary (Last 24 hours) at 11/17/2022 0630  Last data filed at 11/17/2022 0404  Gross per 24 hour   Intake 5095 56 ml   Output 1965 ml   Net 3130 56 ml     Physical Exam  Vitals reviewed  Constitutional:       Interventions: He is sedated, intubated and restrained  HENT:      Head: Normocephalic and atraumatic  Cardiovascular:      Rate and Rhythm: Normal rate  Pulmonary:      Effort: He is intubated  Abdominal:      General: There is no distension  Palpations: Abdomen is soft  Comments: abthera vac in place  LYRIC x2 in place   Musculoskeletal:      Right lower leg: No edema  Left lower leg: No edema     Skin: General: Skin is warm  Invasive Devices     Central Venous Catheter Line  Duration           Port A Cath 03/10/22 Right Chest 251 days          Peripheral Intravenous Line  Duration           Peripheral IV 11/13/22 Right;Upper;Ventral (anterior) Arm 4 days    Peripheral IV 11/14/22 Right;Ventral (anterior) Forearm 2 days    Peripheral IV 11/16/22 Dorsal (posterior); Right Hand 1 day    Peripheral IV 11/16/22 Left Antecubital <1 day          Arterial Line  Duration           Arterial Line 11/16/22 Right Radial <1 day          Drain  Duration           Urethral Catheter 16 Fr  1 day    Closed/Suction Drain Lateral;Right Abdomen <1 day    Closed/Suction Drain Left Abdomen Bulb 19 Fr  <1 day    NG/OG/Enteral Tube Nasogastric <1 day          Airway  Duration           ETT  Cuffed; Hi-Lo 8 mm <1 day                I have personally reviewed pertinent lab results    , CBC:   Lab Results   Component Value Date    WBC 22 89 (H) 11/17/2022    HGB 8 4 (L) 11/17/2022    HCT 25 4 (L) 11/17/2022    MCV 91 11/17/2022     11/17/2022    MCH 30 2 11/17/2022    MCHC 33 1 11/17/2022    RDW 16 0 (H) 11/17/2022    MPV 10 3 11/17/2022    NRBC 0 11/17/2022   , CMP:   Lab Results   Component Value Date    SODIUM 140 11/17/2022    K 4 8 11/17/2022     (H) 11/17/2022    CO2 23 11/17/2022    CO2 27 11/16/2022    BUN 44 (H) 11/17/2022    CREATININE 2 17 (H) 11/17/2022    GLUCOSE 151 (H) 11/16/2022    CALCIUM 7 7 (L) 11/17/2022    AST 38 11/17/2022    ALT 18 11/17/2022    ALKPHOS 158 (H) 11/17/2022    EGFR 32 11/17/2022   , Coagulation: No results found for: PT, INR, APTT

## 2022-11-18 ENCOUNTER — APPOINTMENT (OUTPATIENT)
Dept: RADIOLOGY | Facility: HOSPITAL | Age: 57
End: 2022-11-18

## 2022-11-18 LAB
ALBUMIN SERPL BCP-MCNC: 1.6 G/DL (ref 3.5–5)
ALP SERPL-CCNC: 163 U/L (ref 46–116)
ALT SERPL W P-5'-P-CCNC: 13 U/L (ref 12–78)
ANION GAP SERPL CALCULATED.3IONS-SCNC: 10 MMOL/L (ref 4–13)
ANION GAP SERPL CALCULATED.3IONS-SCNC: 11 MMOL/L (ref 4–13)
ARTERIAL PATENCY WRIST A: YES
AST SERPL W P-5'-P-CCNC: 53 U/L (ref 5–45)
ATRIAL RATE: 108 BPM
BACTERIA SPEC ANAEROBE CULT: NORMAL
BACTERIA SPEC ANAEROBE CULT: NORMAL
BACTERIA TISS AEROBE CULT: ABNORMAL
BASE EXCESS BLDA CALC-SCNC: -2 MMOL/L
BASOPHILS # BLD AUTO: 0.05 THOUSANDS/ÂΜL (ref 0–0.1)
BASOPHILS NFR BLD AUTO: 0 % (ref 0–1)
BILIRUB SERPL-MCNC: 2.85 MG/DL (ref 0.2–1)
BUN SERPL-MCNC: 63 MG/DL (ref 5–25)
BUN SERPL-MCNC: 70 MG/DL (ref 5–25)
CA-I BLD-SCNC: 0.88 MMOL/L (ref 1.12–1.32)
CALCIUM ALBUM COR SERPL-MCNC: 9.8 MG/DL (ref 8.3–10.1)
CALCIUM SERPL-MCNC: 7.9 MG/DL (ref 8.3–10.1)
CALCIUM SERPL-MCNC: 8.1 MG/DL (ref 8.3–10.1)
CHLORIDE SERPL-SCNC: 109 MMOL/L (ref 96–108)
CHLORIDE SERPL-SCNC: 111 MMOL/L (ref 96–108)
CO2 SERPL-SCNC: 21 MMOL/L (ref 21–32)
CO2 SERPL-SCNC: 21 MMOL/L (ref 21–32)
CREAT SERPL-MCNC: 2.91 MG/DL (ref 0.6–1.3)
CREAT SERPL-MCNC: 3.19 MG/DL (ref 0.6–1.3)
CREAT UR-MCNC: 153 MG/DL
EOSINOPHIL # BLD AUTO: 0.08 THOUSAND/ÂΜL (ref 0–0.61)
EOSINOPHIL NFR BLD AUTO: 0 % (ref 0–6)
ERYTHROCYTE [DISTWIDTH] IN BLOOD BY AUTOMATED COUNT: 15.9 % (ref 11.6–15.1)
GFR SERPL CREATININE-BSD FRML MDRD: 20 ML/MIN/1.73SQ M
GFR SERPL CREATININE-BSD FRML MDRD: 22 ML/MIN/1.73SQ M
GLUCOSE SERPL-MCNC: 151 MG/DL (ref 65–140)
GLUCOSE SERPL-MCNC: 156 MG/DL (ref 65–140)
GLUCOSE SERPL-MCNC: 165 MG/DL (ref 65–140)
GLUCOSE SERPL-MCNC: 175 MG/DL (ref 65–140)
GLUCOSE SERPL-MCNC: 179 MG/DL (ref 65–140)
GLUCOSE SERPL-MCNC: 201 MG/DL (ref 65–140)
GRAM STN SPEC: ABNORMAL
GRAM STN SPEC: ABNORMAL
HCO3 BLDA-SCNC: 22.1 MMOL/L (ref 22–28)
HCT VFR BLD AUTO: 24.9 % (ref 36.5–49.3)
HGB BLD-MCNC: 8.1 G/DL (ref 12–17)
HOROWITZ INDEX BLDA+IHG-RTO: 80 MM[HG]
IMM GRANULOCYTES # BLD AUTO: >0.5 THOUSAND/UL (ref 0–0.2)
IMM GRANULOCYTES NFR BLD AUTO: 2 % (ref 0–2)
LACTATE SERPL-SCNC: 1.3 MMOL/L (ref 0.5–2)
LYMPHOCYTES # BLD AUTO: 0.79 THOUSANDS/ÂΜL (ref 0.6–4.47)
LYMPHOCYTES NFR BLD AUTO: 3 % (ref 14–44)
MAGNESIUM SERPL-MCNC: 2.6 MG/DL (ref 1.6–2.6)
MAGNESIUM SERPL-MCNC: 2.9 MG/DL (ref 1.6–2.6)
MCH RBC QN AUTO: 29.8 PG (ref 26.8–34.3)
MCHC RBC AUTO-ENTMCNC: 32.5 G/DL (ref 31.4–37.4)
MCV RBC AUTO: 92 FL (ref 82–98)
MONOCYTES # BLD AUTO: 2.03 THOUSAND/ÂΜL (ref 0.17–1.22)
MONOCYTES NFR BLD AUTO: 8 % (ref 4–12)
NEUTROPHILS # BLD AUTO: 23.66 THOUSANDS/ÂΜL (ref 1.85–7.62)
NEUTS SEG NFR BLD AUTO: 87 % (ref 43–75)
NRBC BLD AUTO-RTO: 0 /100 WBCS
O2 CT BLDA-SCNC: 9.9 ML/DL (ref 16–23)
OXYHGB MFR BLDA: 97.1 % (ref 94–97)
P AXIS: 52 DEGREES
PCO2 BLDA: 34.6 MM HG (ref 36–44)
PEEP RESPIRATORY: 12 CM[H2O]
PH BLDA: 7.42 [PH] (ref 7.35–7.45)
PHOSPHATE SERPL-MCNC: 8.1 MG/DL (ref 2.7–4.5)
PHOSPHATE SERPL-MCNC: 8.7 MG/DL (ref 2.7–4.5)
PLATELET # BLD AUTO: 410 THOUSANDS/UL (ref 149–390)
PMV BLD AUTO: 10.6 FL (ref 8.9–12.7)
PO2 BLDA: 133.7 MM HG (ref 75–129)
POTASSIUM SERPL-SCNC: 4.2 MMOL/L (ref 3.5–5.3)
POTASSIUM SERPL-SCNC: 4.8 MMOL/L (ref 3.5–5.3)
PR INTERVAL: 174 MS
PRESSURE CONTROL: 12
PROT SERPL-MCNC: 5.1 G/DL (ref 6.4–8.4)
QRS AXIS: 29 DEGREES
QRSD INTERVAL: 122 MS
QT INTERVAL: 364 MS
QTC INTERVAL: 487 MS
RBC # BLD AUTO: 2.72 MILLION/UL (ref 3.88–5.62)
SODIUM 24H UR-SCNC: <5 MOL/L
SODIUM SERPL-SCNC: 141 MMOL/L (ref 135–147)
SODIUM SERPL-SCNC: 142 MMOL/L (ref 135–147)
SPECIMEN SOURCE: ABNORMAL
T WAVE AXIS: 19 DEGREES
VENT AC: 14
VENT- AC: AC
VENTRICULAR RATE: 108 BPM
WBC # BLD AUTO: 27.12 THOUSAND/UL (ref 4.31–10.16)

## 2022-11-18 RX ORDER — CALCIUM GLUCONATE 20 MG/ML
2 INJECTION, SOLUTION INTRAVENOUS ONCE
Status: COMPLETED | OUTPATIENT
Start: 2022-11-18 | End: 2022-11-18

## 2022-11-18 RX ORDER — HYDROMORPHONE HCL/PF 1 MG/ML
0.5 SYRINGE (ML) INJECTION
Status: DISCONTINUED | OUTPATIENT
Start: 2022-11-18 | End: 2022-11-22

## 2022-11-18 RX ORDER — DEXMEDETOMIDINE HYDROCHLORIDE 4 UG/ML
.1-.7 INJECTION, SOLUTION INTRAVENOUS
Status: DISCONTINUED | OUTPATIENT
Start: 2022-11-18 | End: 2022-11-22

## 2022-11-18 RX ORDER — ALBUMIN, HUMAN INJ 5% 5 %
12.5 SOLUTION INTRAVENOUS ONCE
Status: COMPLETED | OUTPATIENT
Start: 2022-11-18 | End: 2022-11-18

## 2022-11-18 RX ORDER — ALBUMIN, HUMAN INJ 5% 5 %
12.5 SOLUTION INTRAVENOUS ONCE
Status: COMPLETED | OUTPATIENT
Start: 2022-11-18 | End: 2022-11-19

## 2022-11-18 RX ORDER — ALBUMIN (HUMAN) 12.5 G/50ML
25 SOLUTION INTRAVENOUS EVERY 6 HOURS
Status: DISCONTINUED | OUTPATIENT
Start: 2022-11-18 | End: 2022-11-19

## 2022-11-18 RX ADMIN — PANTOPRAZOLE SODIUM 40 MG: 40 INJECTION, POWDER, FOR SOLUTION INTRAVENOUS at 08:19

## 2022-11-18 RX ADMIN — CALCIUM GLUCONATE 2 G: 20 INJECTION, SOLUTION INTRAVENOUS at 20:54

## 2022-11-18 RX ADMIN — INSULIN LISPRO 2 UNITS: 100 INJECTION, SOLUTION INTRAVENOUS; SUBCUTANEOUS at 06:53

## 2022-11-18 RX ADMIN — HYDROMORPHONE HYDROCHLORIDE 0.5 MG: 1 INJECTION, SOLUTION INTRAMUSCULAR; INTRAVENOUS; SUBCUTANEOUS at 16:48

## 2022-11-18 RX ADMIN — FENTANYL CITRATE 50 MCG: 50 INJECTION INTRAMUSCULAR; INTRAVENOUS at 20:03

## 2022-11-18 RX ADMIN — HYDROMORPHONE HYDROCHLORIDE 0.5 MG: 1 INJECTION, SOLUTION INTRAMUSCULAR; INTRAVENOUS; SUBCUTANEOUS at 23:36

## 2022-11-18 RX ADMIN — HYDROMORPHONE HYDROCHLORIDE 0.5 MG: 1 INJECTION, SOLUTION INTRAMUSCULAR; INTRAVENOUS; SUBCUTANEOUS at 14:22

## 2022-11-18 RX ADMIN — CHLORHEXIDINE GLUCONATE 15 ML: 1.2 SOLUTION ORAL at 20:55

## 2022-11-18 RX ADMIN — SODIUM CHLORIDE, SODIUM GLUCONATE, SODIUM ACETATE, POTASSIUM CHLORIDE AND MAGNESIUM CHLORIDE 75 ML/HR: 526; 502; 368; 37; 30 INJECTION, SOLUTION INTRAVENOUS at 01:54

## 2022-11-18 RX ADMIN — FENTANYL CITRATE 50 MCG: 50 INJECTION INTRAMUSCULAR; INTRAVENOUS at 18:10

## 2022-11-18 RX ADMIN — PROPOFOL 15 MCG/KG/MIN: 10 INJECTION, EMULSION INTRAVENOUS at 01:00

## 2022-11-18 RX ADMIN — SODIUM CHLORIDE, SODIUM GLUCONATE, SODIUM ACETATE, POTASSIUM CHLORIDE AND MAGNESIUM CHLORIDE 75 ML/HR: 526; 502; 368; 37; 30 INJECTION, SOLUTION INTRAVENOUS at 21:34

## 2022-11-18 RX ADMIN — ACETAMINOPHEN 650 MG: 650 SUPPOSITORY RECTAL at 17:47

## 2022-11-18 RX ADMIN — SODIUM CHLORIDE, SODIUM GLUCONATE, SODIUM ACETATE, POTASSIUM CHLORIDE AND MAGNESIUM CHLORIDE 75 ML/HR: 526; 502; 368; 37; 30 INJECTION, SOLUTION INTRAVENOUS at 16:49

## 2022-11-18 RX ADMIN — ALBUMIN (HUMAN) 12.5 G: 12.5 INJECTION, SOLUTION INTRAVENOUS at 18:23

## 2022-11-18 RX ADMIN — Medication 100 MCG/HR: at 03:25

## 2022-11-18 RX ADMIN — Medication 0.3 MCG/KG/HR: at 19:00

## 2022-11-18 RX ADMIN — INSULIN LISPRO 2 UNITS: 100 INJECTION, SOLUTION INTRAVENOUS; SUBCUTANEOUS at 17:47

## 2022-11-18 RX ADMIN — HEPARIN SODIUM 5000 UNITS: 5000 INJECTION INTRAVENOUS; SUBCUTANEOUS at 13:50

## 2022-11-18 RX ADMIN — ERTAPENEM SODIUM 1000 MG: 1 INJECTION, POWDER, LYOPHILIZED, FOR SOLUTION INTRAMUSCULAR; INTRAVENOUS at 19:00

## 2022-11-18 RX ADMIN — HEPARIN SODIUM 5000 UNITS: 5000 INJECTION INTRAVENOUS; SUBCUTANEOUS at 06:53

## 2022-11-18 RX ADMIN — INSULIN LISPRO 4 UNITS: 100 INJECTION, SOLUTION INTRAVENOUS; SUBCUTANEOUS at 13:51

## 2022-11-18 RX ADMIN — ALBUMIN (HUMAN) 25 G: 0.25 INJECTION, SOLUTION INTRAVENOUS at 22:50

## 2022-11-18 RX ADMIN — CHLORHEXIDINE GLUCONATE 15 ML: 1.2 SOLUTION ORAL at 08:19

## 2022-11-18 RX ADMIN — CALCIUM GLUCONATE 3 G: 98 INJECTION, SOLUTION INTRAVENOUS at 08:19

## 2022-11-18 RX ADMIN — Medication 0.2 MCG/KG/HR: at 11:00

## 2022-11-18 RX ADMIN — PROPOFOL 15 MCG/KG/MIN: 10 INJECTION, EMULSION INTRAVENOUS at 06:57

## 2022-11-18 RX ADMIN — ALBUMIN (HUMAN) 12.5 G: 12.5 SOLUTION INTRAVENOUS at 20:55

## 2022-11-18 RX ADMIN — INSULIN LISPRO 2 UNITS: 100 INJECTION, SOLUTION INTRAVENOUS; SUBCUTANEOUS at 00:01

## 2022-11-18 RX ADMIN — ALBUMIN HUMAN 12.5 G: 0.05 INJECTION, SOLUTION INTRAVENOUS at 11:00

## 2022-11-18 NOTE — CASE MANAGEMENT
Case Management Progress Note    Patient name Judy Cervantes  Location [facility] 517/[facility] 387-41 MRN [id number]  : 1965 Date 2022       LOS (days): 11  Geometric Mean LOS (GMLOS) (days): 9 80  Days to GMLOS:-1 5        OBJECTIVE:        Current admission status: Inpatient  Preferred Pharmacy:   [facility]/pharmacy #[id number] [facility], [address] [place] [address]  Phone: [phone] Fax: [address] [place] [address] [facility] [address]  [address] [address]  Phone: [phone] Fax: [phone]    [facility] - [address] [facility] [address]  Phone: [phone] Fax: [phone]    Primary Care Provider: Zan Arenas MD    Primary Insurance: [employer]  Secondary Insurance:     PROGRESS NOTE: PER UPDATE FROM surgery ANJELICA Starkey  Pt requires continued inpatient stay due to complications following colon resection , anastomotic leak, infected hematoma  Pt to OR on  for exploratory lap with Ileostomy  Pt currently remains intubated in the critical care unit  CM will follow and assist with DC Planning

## 2022-11-18 NOTE — PROGRESS NOTES
Progress Note - Infectious Disease   Eloise Fraction 61 y o  male MRN: [id number]  Unit/Bed#: [facility] 517-01 Encounter: [id number]      Impression/Recommendations:  1  Sepsis, secondary to intra-abdominal infection and bacteremia   Patient is clinically much improved on antibiotic and status post I&D/bowel resection  Temperature up overnight in early postop period, but down again today  WBC trending down  Antibiotic plan as in below  Monitor temperature/WBC  Monitor hemodynamics      2  ESBL producing E coli bacteremia, likely secondary to gut translocation during recent colon surgery   Patient is clinically improved   Bacteremia cleared   Patient completed antibiotic course for this but given probable anastomotic leak, will continue antibiotic for now  No further antibiotic needed for this      3  Anastomotic leak up with intra-abdominal abscess/infected hematoma   Patient is status post exploratory laparotomy, with drainage of abscess and cecum resection  For operative culture growing E coli, likely the same ESBL producing E coli again  Patient had repeat ex lap yesterday, with healthy-appearing bowel  Continue IV ertapenem for now  Follow-up on operative culture  Treat x7 days postop      4  Metastatic colon cancer, with liver metastases   Patient is status post transverse colon resection  Surgical oncology follow-up      5  ALEXY, superimposed on CKD   Creatinine was improving but now worsening postop  Antibiotic at full dose for now  Monitor creatinine  If creatinine continues to rise, will need to decrease antibiotic dose      6  Acute hypoxic respiratory failure, previously on ventilator   Patient was extubated but now intubated again postop   No clinical pneumonia  Monitor respiratory status      7  Encephalopathy, multifactorial   Mental status was improved but now sedated on ventilator    Monitor mental status      8  Type 2 DM      Antibiotics:  Ertapenem # 10  POD #      Subjective:  Patient is currently intubated, in ICU  He opens eyes to verbal stimuli  Temperature stays down  He is tolerating antibiotic well   No nausea, vomiting or diarrhea      Objective:  Vitals:  Temp:  [98 6 °F (37 °C)-100 2 °F (37 9 °C)] 99 3 °F (37 4 °C)  HR:  [] 102  Resp:  [9-19] 15  BP: (105-136)/(57-72) 119/70  SpO2:  [93 %-99 %] 96 %  Temp (24hrs), Av 4 °F (37 4 °C), Min:98 6 °F (37 °C), Max:100 2 °F (37 9 °C)  Current: Temperature: 99 3 °F (37 4 °C)    Physical Exam:     General: Sedated on ventilator, opens eyes to verbal stimuli  Comfortable  Nontoxic  Neck:  Supple  No mass  No lymphadenopathy  Lungs: Expansion symmetric, no rales, no wheezing, respirations unlabored  Heart:  Regular rate and rhythm, S1 and S2 normal, no murmur  Abdomen: Soft, mild to moderate distension, wound with VAC, without purulence  No erythema/warmth  Extremities: Stable leg edema  No erythema/warmth  No ulcer  Nontender to palpation  Skin:  No rash  Neuro: Not assessable  Invasive Devices     Central Venous Catheter Line  Duration           Port A Cath 03/10/22 Right Chest 252 days          Peripheral Intravenous Line  Duration           Peripheral IV 22 Right;Ventral (anterior) Forearm 3 days    Peripheral IV 22 Dorsal (posterior); Right Hand 2 days    Peripheral IV 22 Left Antecubital 1 day          Arterial Line  Duration           Arterial Line 22 Right Radial 1 day          Drain  Duration           Urethral Catheter 16 Fr  2 days    Closed/Suction Drain Lateral;Right Abdomen 1 day    Closed/Suction Drain Left Abdomen Bulb 19 Fr  1 day    NG/OG/Enteral Tube Nasogastric 1 day    Ileostomy LUQ <1 day          Airway  Duration           ETT  Cuffed; Hi-Lo 8 mm 1 day                Labs studies:   I have personally reviewed pertinent labs    Results from last 7 days   Lab Units 22  0447 22  1603 22  0423 22  2328 22  1546 11/16/22  1343   POTASSIUM mmol/L 4 8 4 9 4 8   < > 4 2  --    CHLORIDE mmol/L 109* 110* 111*   < > 113*  --    CO2 mmol/L 21 23 23   < > 22  --    CO2, I-STAT mmol/L  --   --   --   --   --  27   BUN mg/dL 63* 52* 44*   < > 31*  --    CREATININE mg/dL 2 91* 2 63* 2 17*   < > 1 33*  --    EGFR ml/min/1 73sq m 22 25 32   < > 58  --    GLUCOSE, ISTAT mg/dl  --   --   --   --   --  151*   CALCIUM mg/dL 7 9* 7 8* 7 7*   < > 7 5*  --    AST U/L 53*  --  38  --  40  --    ALT U/L 13  --  18  --  23  --    ALK PHOS U/L 163*  --  158*  --  220*  --     < > = values in this interval not displayed  Results from last 7 days   Lab Units 11/18/22  0447 11/17/22  1603 11/17/22  0423   WBC Thousand/uL 27 12* 22 72* 22 89*   HEMOGLOBIN g/dL 8 1* 8 8* 8 4*   PLATELETS Thousands/uL 410* 457* 346     Results from last 7 days   Lab Units 11/17/22  0016 11/16/22  1315 11/16/22  1314   BLOOD CULTURE  No Growth at 24 hrs  No Growth at 24 hrs   --   --    GRAM STAIN RESULT   --  No Polys or Bacteria seen No Polys or Bacteria seen       Imaging Studies:   I have personally reviewed pertinent imaging study reports and images in PACS  EKG, Pathology, and Other Studies:   I have personally reviewed pertinent reports

## 2022-11-18 NOTE — PROGRESS NOTES
Progress Note - General Surgery  Madison Andrea 61 y o  male MRN: [id number]  Unit/Bed#: [facility] 517-01 Encounter: [id number]      Assessment:  62yo M with metastatic colon cancer now s/p exploratory laparotomy, transverse colon resection, reversal of loop colostomy, peritoneal biopsy, and segment 3 hepatic resection w/ a post-operative course complicated by a LUQ hematoma requiring blood transfusion and hypoxic respiratory failure w/ recurrent encephalopathy requiring endotracheal intubation (now extubated), c/b anastomotic leak  S/p: Ex lap, right hemicolectomy, left discontinuity, Abthera on 11/16  Afebrile, VSS  Intubated: SCMV:  14/450/40%8 of peep  UOP:  595 about 0 3 cc/kg per hour    L LYRIC: 495 CC serosanguineous  R LYRIC: 10 cc, serosanguineous  VAC: 200 cc serous      Plan:  Wean to extubate today  Sedation and pain control per ICU  Continue gentle fluids  Monitor urine output  Appreciate ICU level of care  Continue abx  Maintain wound vac, lyric x2; rivero and NG monitor output    Subjective/Objective     Subjective/Events:  Pt taken back to OR for bowel resection, ileostomy and fascial closure with subcutaneous wound vac placement yesterday  Did well overnight  Left intubated on stable vent settings  Off pressors;   UOP marginal overnight    Objective:     Blood pressure 115/70, pulse (!) 108, temperature 100 °F (37 8 °C), resp  rate 18, height 5' 8" (1 727 m), weight 118 kg (259 lb 7 7 oz), SpO2 96 %  ,Body mass index is 39 45 kg/m²  Intake/Output Summary (Last 24 hours) at 11/18/2022 0111  Last data filed at 11/18/2022 0000  Gross per 24 hour   Intake 4524 89 ml   Output 1680 ml   Net 2844 89 ml     Physical Exam  Vitals reviewed  Constitutional:       Interventions: He is sedated, intubated and restrained  HENT:      Head: Normocephalic and atraumatic  Cardiovascular:      Rate and Rhythm: Normal rate  Pulmonary:      Effort: He is intubated  Abdominal:      General: There is no distension  Palpations: Abdomen is soft  Comments: Wound vac in place  VICTORINO drain x2   Musculoskeletal:      Right lower leg: No edema  Left lower leg: No edema  Skin:     General: Skin is warm  Invasive Devices     Central Venous Catheter Line  Duration           Port A Cath 03/10/22 Right Chest 252 days          Peripheral Intravenous Line  Duration           Peripheral IV 11/14/22 Right;Ventral (anterior) Forearm 3 days    Peripheral IV 11/16/22 Dorsal (posterior); Right Hand 1 day    Peripheral IV 11/16/22 Left Antecubital 1 day          Arterial Line  Duration           Arterial Line 11/16/22 Right Radial 1 day          Drain  Duration           Urethral Catheter 16 Fr  2 days    Closed/Suction Drain Lateral;Right Abdomen 1 day    Closed/Suction Drain Left Abdomen Bulb 19 Fr  1 day    NG/OG/Enteral Tube Nasogastric 1 day    Ileostomy LUQ <1 day          Airway  Duration           ETT  Cuffed; Hi-Lo 8 mm 1 day                I have personally reviewed pertinent lab results    , CBC:   Lab Results   Component Value Date    WBC 22 72 (H) 11/17/2022    HGB 8 8 (L) 11/17/2022    HCT 27 2 (L) 11/17/2022    MCV 90 11/17/2022     (H) 11/17/2022    MCH 29 0 11/17/2022    MCHC 32 4 11/17/2022    RDW 16 1 (H) 11/17/2022    MPV 10 5 11/17/2022    NRBC 0 11/17/2022   , CMP:   Lab Results   Component Value Date    SODIUM 139 11/17/2022    K 4 9 11/17/2022     (H) 11/17/2022    CO2 23 11/17/2022    BUN 52 (H) 11/17/2022    CREATININE 2 63 (H) 11/17/2022    CALCIUM 7 8 (L) 11/17/2022    AST 38 11/17/2022    ALT 18 11/17/2022    ALKPHOS 158 (H) 11/17/2022    EGFR 25 11/17/2022   , Coagulation: No results found for: PT, INR, APTT

## 2022-11-18 NOTE — PROGRESS NOTES
Daily Progress Note - Critical Care   Merritt Whiting 61 y o  male MRN: [id number]  Unit/Bed#: [facility] 517-01 Encounter: [id number]        ----------------------------------------------------------------------------------------  24hr events: Patient back to OR yesterday for diverting loop ileostomy  Fascial closure with skin VAC  Patient was left intubated post procedure  Patient with worsening renal function and poor UOP  Given 1L bolus isolyte and 25g 25% albumin     ---------------------------------------------------------------------------------------  SUBJECTIVE  Unable to assess secondary to ETT      Review of Systems   Unable to perform ROS: Intubated     Review of systems was unable to be performed secondary to ETT  ---------------------------------------------------------------------------------------  Assessment and Plan:    Neuro:   • Diagnosis: Acute post operative pain/sedation  o Plan:   - Fentanyl and propofol gtt  - Fentanyl prn  • Diagnosis: Acute metabolic encephalopathy  o Likely metabolic in setting of sepsis, delirium related to critical illness  o Plan:   - Frequent neuro checks  - CAM ICU when able/extubated      CV:   • Diagnosis: Paroxysmal atrial tachycardia  o Plan:   - Continuous cardiac monitoring  - Replete lytes as below  • Diagnosis: History of HTN  o Plan:   - Goal MAP >65  - Home amlodipine, lisinopril held  • Diagnosis: History of HLD  o Plan:  - Consider restarting home atorvastatin      Pulm:  • Diagnosis: Acute hypoxic respiratory failure  o Plan:   - Wean vent as able  - SBT today, can consider extubation when appropriate      GI:   • Diagnosis: Metastatic colon cancer   o S/p ex lap, transverse colon resection, reversal of loop colostomy, peritoneal biopsy and segment 3 liver resection on 11/7  o Anastomotic leak s/p ex lap, R hemicolectomy 11/16  o S/p diverting loop ileostomy 11/17  o Plan:  - Monitor drain/VAC output  - Serial abdominal exams  - General surgery following, appreciate ongoing recs  • Diagnosis: History of GERD  o Plan:  - Protonix      :   • Diagnosis: ALEXY on CKD stage 3  o Likely in setting of ATN, sepsis  o Plan:   - Worsening renal function, likely in setting of sepsis  - Cr uptrending, decreased UOP  - Strict I&Os      F/E/N:   • Plan:   o F- isolyte @ 75/hr  o E- monitor and replete as needed, goal Mag >2, Phos >3, K >4  o N- NPO      Heme/Onc:   • Diagnosis: Acute blood loss anemia  o Likely in setting of recent procedures, critical illness and frequent blood draws  o Plan:  - Continue to monitor H/H, 8 1 from 8 8 today  • Diagnosis: DVT ppx  o Plan:   - SQH      Endo:   • Diagnosis: T2DM with hyperglycemia  o Plan:  - SSI algorithm 4  - Lantus 15 U at bedtime  - Goal -180      ID:   • Diagnosis: Severe sepsis  o In setting of anastomotic leak, positive E coli bacteremia  o Plan:   - Repeat blood cultures 11/17, will follow  - On ertapenem, will continue  - ID following, appreciate recs      MSK/Skin:   • Diagnosis: No acute issues  o Plan:   - Frequent turns/reposition  - PT/OT when able  - Surgical site care as per general surgery    Patient appropriate for transfer out of the ICU today?: No  Disposition: Continue Critical Care   Code Status: Level 1 - Full Code  ---------------------------------------------------------------------------------------  ICU CORE MEASURES    Prophylaxis   VTE Pharmacologic Prophylaxis: Heparin  VTE Mechanical Prophylaxis: sequential compression device  Stress Ulcer Prophylaxis: Pantoprazole IV     ABCDE Protocol (if indicated)  Plan to perform spontaneous awakening trial today? Yes  Plan to perform spontaneous breathing trial today? Yes  Obvious barriers to extubation?  Yes    Invasive Devices Review  Invasive Devices     Central Venous Catheter Line  Duration           Port A Cath 03/10/22 Right Chest 252 days          Peripheral Intravenous Line  Duration           Peripheral IV 11/14/22 Right;Ventral (anterior) Forearm 3 days    Peripheral IV 22 Dorsal (posterior); Right Hand 2 days    Peripheral IV 22 Left Antecubital 1 day          Arterial Line  Duration           Arterial Line 22 Right Radial 1 day          Drain  Duration           Urethral Catheter 16 Fr  2 days    Closed/Suction Drain Lateral;Right Abdomen 1 day    Closed/Suction Drain Left Abdomen Bulb 19 Fr  1 day    NG/OG/Enteral Tube Nasogastric 1 day    Ileostomy LUQ <1 day          Airway  Duration           ETT  Cuffed; Hi-Lo 8 mm 1 day              Can any invasive devices be discontinued today? Yes  ---------------------------------------------------------------------------------------  OBJECTIVE    Vitals   Vitals:    22 0400 22 0419 22 0500 22 0600   BP: 111/67  115/70 119/70   Pulse: 103  102 102   Resp: 15  19 16   Temp: 99 5 °F (37 5 °C)  99 3 °F (37 4 °C) 99 1 °F (37 3 °C)   TempSrc:       SpO2: 96% 97% 94% 96%   Weight:       Height:         Temp (24hrs), Av 3 °F (37 4 °C), Min:98 6 °F (37 °C), Max:100 2 °F (37 9 °C)  Current: Temperature: 99 1 °F (37 3 °C)    Respiratory:  SpO2: SpO2: 96 %  Nasal Cannula O2 Flow Rate (L/min): 6 L/min    Invasive/non-invasive ventilation settings   Respiratory    Lab Data (Last 4 hours)    None         O2/Vent Data (Last 4 hours)    None                Physical Exam  Constitutional:       Interventions: He is sedated and intubated  HENT:      Head: Normocephalic and atraumatic  Right Ear: External ear normal       Left Ear: External ear normal       Nose: Nose normal       Mouth/Throat:      Mouth: Mucous membranes are moist    Eyes:      General: No scleral icterus  Right eye: No discharge  Left eye: No discharge  Extraocular Movements: Extraocular movements intact  Conjunctiva/sclera: Conjunctivae normal    Cardiovascular:      Rate and Rhythm: Normal rate and regular rhythm  Heart sounds: Normal heart sounds  No murmur heard      No friction rub  No gallop  Pulmonary:      Effort: He is intubated  Breath sounds: Normal breath sounds  Abdominal:      General: Abdomen is flat  Tenderness: There is no abdominal tenderness  Comments: Wound vac clean, dry, intact   Genitourinary:     Comments: Negro in place  Musculoskeletal:      Right lower leg: Edema present  Left lower leg: Edema present  Comments: Peripheral edema of b/l UE and LE   Skin:     General: Skin is warm and dry  Neurological:      General: No focal deficit present  Comments: Opens eyes when stimulated, nods head             Laboratory and Diagnostics:  Results from last 7 days   Lab Units 11/18/22 0447 11/17/22  1603 11/17/22  0423 11/16/22  2328 11/16/22  1546 11/16/22  1343 11/16/22  0430 11/15/22  0517 11/13/22  1230 11/13/22  0442   WBC Thousand/uL 27 12* 22 72* 22 89* 29 65* 25 84*  --  21 07* 17 03*   < > 15 65*   HEMOGLOBIN g/dL 8 1* 8 8* 8 4* 10 1* 10 7*  --  7 3* 7 4*   < > 6 7*   I STAT HEMOGLOBIN g/dl  --   --   --   --   --  8 5*  --   --   --   --    HEMATOCRIT % 24 9* 27 2* 25 4* 31 0* 33 9*  --  22 5* 22 6*   < > 20 3*   HEMATOCRIT, ISTAT %  --   --   --   --   --  25*  --   --   --   --    PLATELETS Thousands/uL 410* 457* 346 452* 454*  --  340 288   < > 243   NEUTROS PCT % 87*  --  85* 88*  --   --  83*  --   --   --    BANDS PCT %  --   --   --   --  9*  --   --   --   --   --    MONOS PCT % 8  --  8 6  --   --  6  --   --   --    MONO PCT %  --   --   --   --  0*  --   --   --   --  13*    < > = values in this interval not displayed       Results from last 7 days   Lab Units 11/18/22 0447 11/17/22  1603 11/17/22  0423 11/16/22  2328 11/16/22  1546 11/16/22  1343 11/16/22  0430 11/15/22  0517 11/14/22  0545 11/13/22  0442 11/12/22  0604   SODIUM mmol/L 141 139 140 141 143  --  140 143 143 142 143   POTASSIUM mmol/L 4 8 4 9 4 8 5 0 4 2  --  3 9 3 7 3 6 3 7 3 9   CHLORIDE mmol/L 109* 110* 111* 112* 113*  --  109* 110* 111* 112* 111*   CO2 mmol/L 21 23 23 22 22  --  25 26 25 26 23   CO2, I-STAT mmol/L  --   --   --   --   --  27  --   --   --   --   --    ANION GAP mmol/L 11 6 6 7 8  --  6 7 7 4 9   BUN mg/dL 63* 52* 44* 38* 31*  --  29* 31* 35* 45* 39*   CREATININE mg/dL 2 91* 2 63* 2 17* 1 88* 1 33*  --  1 25 1 30 1 26 1 48* 1 59*   CALCIUM mg/dL 7 9* 7 8* 7 7* 7 6* 7 5*  --  8 3 8 1* 8 2* 8 1* 8 2*   GLUCOSE RANDOM mg/dL 175* 199* 220* 195* 177*  --  185* 179* 192* 197* 214*   ALT U/L 13  --  18  --  23  --   --   --  46 61 62   AST U/L 53*  --  38  --  40  --   --   --  49* 69* 65*   ALK PHOS U/L 163*  --  158*  --  220*  --   --   --  228* 265* 304*   ALBUMIN g/dL 1 6*  --  2 0*  --  1 4*  --   --   --  1 7* 1 8* 1 8*   TOTAL BILIRUBIN mg/dL 2 85*  --  2 20*  --  1 53*  --   --   --  1 18* 1 08* 1 33*     Results from last 7 days   Lab Units 11/18/22  0447 11/17/22  0423 11/16/22  2328 11/16/22  1546 11/15/22  0517 11/14/22  0545 11/13/22  0442   MAGNESIUM mg/dL 2 6 2 3 2 2 2 2 2 4 2 4 2 5   PHOSPHORUS mg/dL 8 7* 7 3* 6 3* 5 0* 3 9 3 9 4 2      Results from last 7 days   Lab Units 11/12/22  0604   INR  1 10          Results from last 7 days   Lab Units 11/17/22  1603 11/16/22  2328 11/16/22  1546 11/16/22  0430   LACTIC ACID mmol/L 1 5 1 6 1 4 1 6     ABG:  Results from last 7 days   Lab Units 11/17/22  1605   PH ART  7 317*   PCO2 ART mm Hg 43 1   PO2 ART mm Hg 80 4   HCO3 ART mmol/L 21 6*   BASE EXC ART mmol/L -4 4   ABG SOURCE  Line, Arterial     VBG:  Results from last 7 days   Lab Units 11/17/22  1605 11/16/22  1547 11/16/22  0430   PH RUMA   --   --  7 420*   PCO2 RUMA mm Hg  --   --  40 1*   PO2 RUMA mm Hg  --   --  39 0   HCO3 RUMA mmol/L  --   --  25 4   BASE EXC RUMA mmol/L  --   --  0 9   ABG SOURCE  Line, Arterial   < >  --     < > = values in this interval not displayed  Micro  Results from last 7 days   Lab Units 11/17/22  0016 11/16/22  1315 11/16/22  1314   BLOOD CULTURE  Received in Microbiology Lab  Culture in Progress  Received in Microbiology Lab  Culture in Progress  --   --    GRAM STAIN RESULT   --  No Polys or Bacteria seen No Polys or Bacteria seen       Imaging:  I have personally reviewed pertinent reports  Intake and Output  I/O       11/16 0701 11/17 0700 11/17 0701 11/18 0700    P  O   0    I V  (mL/kg) 3725 9 (31 6) 4479 6 (38)    Blood 700     NG/GT 0 0    IV Piggyback 730 450    Total Intake(mL/kg) 5155 9 (43 7) 4929 6 (41 8)    Urine (mL/kg/hr) 510 (0 2) 655 (0 2)    Emesis/NG output 250 150    Drains 1100 750    Stool  50    Blood 400     Total Output 2260 1605    Net +2895 9 +3324 6                Height and Weights   Height: 5' 8" (172 7 cm)  IBW (Ideal Body Weight): 68 4 kg  Body mass index is 39 45 kg/m²  Weight (last 2 days)     Date/Time Weight    11/16/22 0400 118 (259 48)            Nutrition       Diet Orders   (From admission, onward)             Start     Ordered    11/16/22 0823  Diet NPO  Diet effective now        References:    Nutrtion Support Algorithm Enteral vs  Parenteral   Question Answer Comment   Diet Type NPO    RD to adjust diet per protocol?  No        11/16/22 1160                  Active Medications  Scheduled Meds:  Current Facility-Administered Medications   Medication Dose Route Frequency Provider Last Rate   • acetaminophen  650 mg Rectal Q4H PRN GAURANG Reyes     • chlorhexidine  15 mL Mouth/Throat Q12H [address] GAURANG Reyes     • ertapenem  1,000 mg Intravenous Q24H GAURANG Reyes Stopped (11/17/22 1830)   • fentaNYL  100 mcg/hr Intravenous Continuous Shepard Samano,  mcg/hr (11/18/22 0325)   • fentanyl citrate (PF)  50 mcg Intravenous Q1H PRN GAURANG Reyes     • heparin (porcine)  5,000 Units Subcutaneous [facility] GAURANG Reyes     • insulin glargine  15 Units Subcutaneous HS GAURANG Reyes     • insulin lispro  2-12 Units Subcutaneous Q6H [address] GAURANG Reyes     • multi-electrolyte 75 mL/hr Intravenous Continuous Chris [place], CRNP 75 mL/hr (11/18/22 0154)   • ondansetron  4 mg Intravenous Q4H PRN Chris [place], CRNP     • pantoprazole  40 mg Intravenous Q24H [address] [place], CRNP     • propofol  5-50 mcg/kg/min Intravenous Titrated Jeanette Branch MD 15 mcg/kg/min (11/18/22 0657)     Continuous Infusions:  fentaNYL, 100 mcg/hr, Last Rate: 100 mcg/hr (11/18/22 0325)  multi-electrolyte, 75 mL/hr, Last Rate: 75 mL/hr (11/18/22 0154)  propofol, 5-50 mcg/kg/min, Last Rate: 15 mcg/kg/min (11/18/22 0657)      PRN Meds:   acetaminophen, 650 mg, Q4H PRN  fentanyl citrate (PF), 50 mcg, Q1H PRN  ondansetron, 4 mg, Q4H PRN        Allergies   Allergies   Allergen Reactions   • Shellfish-Derived Products - Food Allergy Anaphylaxis   • Erythromycin GI Intolerance     ---------------------------------------------------------------------------------------  Advance Directive and Living Will:      Power of :    POLST:    ---------------------------------------------------------------------------------------  Care Time Delivered:   No Critical Care time spent     [place], DO      Portions of the record may have been created with voice recognition software  Occasional wrong word or "sound a like" substitutions may have occurred due to the inherent limitations of voice recognition software    Read the chart carefully and recognize, using context, where substitutions have occurred

## 2022-11-19 ENCOUNTER — APPOINTMENT (OUTPATIENT)
Dept: RADIOLOGY | Facility: HOSPITAL | Age: 57
End: 2022-11-19

## 2022-11-19 LAB
ABO GROUP BLD BPU: NORMAL
ALBUMIN SERPL BCP-MCNC: 1.9 G/DL (ref 3.5–5)
ALP SERPL-CCNC: 141 U/L (ref 46–116)
ALT SERPL W P-5'-P-CCNC: 12 U/L (ref 12–78)
ANION GAP SERPL CALCULATED.3IONS-SCNC: 11 MMOL/L (ref 4–13)
ANION GAP SERPL CALCULATED.3IONS-SCNC: 9 MMOL/L (ref 4–13)
AST SERPL W P-5'-P-CCNC: 58 U/L (ref 5–45)
BASOPHILS # BLD AUTO: 0.03 THOUSANDS/ÂΜL (ref 0–0.1)
BASOPHILS # BLD AUTO: 0.03 THOUSANDS/ÂΜL (ref 0–0.1)
BASOPHILS NFR BLD AUTO: 0 % (ref 0–1)
BASOPHILS NFR BLD AUTO: 0 % (ref 0–1)
BILIRUB DIRECT SERPL-MCNC: 2.78 MG/DL (ref 0–0.2)
BILIRUB SERPL-MCNC: 3.33 MG/DL (ref 0.2–1)
BPU ID: NORMAL
BUN SERPL-MCNC: 56 MG/DL (ref 5–25)
BUN SERPL-MCNC: 71 MG/DL (ref 5–25)
CA-I BLD-SCNC: 0.96 MMOL/L (ref 1.12–1.32)
CALCIUM SERPL-MCNC: 8.2 MG/DL (ref 8.3–10.1)
CALCIUM SERPL-MCNC: 8.4 MG/DL (ref 8.3–10.1)
CHLORIDE SERPL-SCNC: 110 MMOL/L (ref 96–108)
CHLORIDE SERPL-SCNC: 110 MMOL/L (ref 96–108)
CO2 SERPL-SCNC: 22 MMOL/L (ref 21–32)
CO2 SERPL-SCNC: 23 MMOL/L (ref 21–32)
CREAT SERPL-MCNC: 2.93 MG/DL (ref 0.6–1.3)
CREAT SERPL-MCNC: 3.14 MG/DL (ref 0.6–1.3)
CROSSMATCH: NORMAL
EOSINOPHIL # BLD AUTO: 0 THOUSAND/ÂΜL (ref 0–0.61)
EOSINOPHIL # BLD AUTO: 0 THOUSAND/ÂΜL (ref 0–0.61)
EOSINOPHIL NFR BLD AUTO: 0 % (ref 0–6)
EOSINOPHIL NFR BLD AUTO: 0 % (ref 0–6)
ERYTHROCYTE [DISTWIDTH] IN BLOOD BY AUTOMATED COUNT: 15.7 % (ref 11.6–15.1)
ERYTHROCYTE [DISTWIDTH] IN BLOOD BY AUTOMATED COUNT: 15.9 % (ref 11.6–15.1)
ERYTHROCYTE [DISTWIDTH] IN BLOOD BY AUTOMATED COUNT: 16.3 % (ref 11.6–15.1)
GFR SERPL CREATININE-BSD FRML MDRD: 20 ML/MIN/1.73SQ M
GFR SERPL CREATININE-BSD FRML MDRD: 22 ML/MIN/1.73SQ M
GLUCOSE SERPL-MCNC: 141 MG/DL (ref 65–140)
GLUCOSE SERPL-MCNC: 152 MG/DL (ref 65–140)
GLUCOSE SERPL-MCNC: 153 MG/DL (ref 65–140)
GLUCOSE SERPL-MCNC: 154 MG/DL (ref 65–140)
GLUCOSE SERPL-MCNC: 159 MG/DL (ref 65–140)
GLUCOSE SERPL-MCNC: 166 MG/DL (ref 65–140)
HCT VFR BLD AUTO: 19.7 % (ref 36.5–49.3)
HCT VFR BLD AUTO: 20.9 % (ref 36.5–49.3)
HCT VFR BLD AUTO: 22 % (ref 36.5–49.3)
HGB BLD-MCNC: 6.3 G/DL (ref 12–17)
HGB BLD-MCNC: 6.4 G/DL (ref 12–17)
HGB BLD-MCNC: 7 G/DL (ref 12–17)
HGB BLD-MCNC: 7.1 G/DL (ref 12–17)
IMM GRANULOCYTES # BLD AUTO: 0.1 THOUSAND/UL (ref 0–0.2)
IMM GRANULOCYTES # BLD AUTO: 0.21 THOUSAND/UL (ref 0–0.2)
IMM GRANULOCYTES NFR BLD AUTO: 1 % (ref 0–2)
IMM GRANULOCYTES NFR BLD AUTO: 1 % (ref 0–2)
LYMPHOCYTES # BLD AUTO: 0.61 THOUSANDS/ÂΜL (ref 0.6–4.47)
LYMPHOCYTES # BLD AUTO: 0.78 THOUSANDS/ÂΜL (ref 0.6–4.47)
LYMPHOCYTES NFR BLD AUTO: 4 % (ref 14–44)
LYMPHOCYTES NFR BLD AUTO: 4 % (ref 14–44)
MAGNESIUM SERPL-MCNC: 2.9 MG/DL (ref 1.6–2.6)
MCH RBC QN AUTO: 29.1 PG (ref 26.8–34.3)
MCH RBC QN AUTO: 29.4 PG (ref 26.8–34.3)
MCH RBC QN AUTO: 29.9 PG (ref 26.8–34.3)
MCHC RBC AUTO-ENTMCNC: 32.3 G/DL (ref 31.4–37.4)
MCHC RBC AUTO-ENTMCNC: 32.5 G/DL (ref 31.4–37.4)
MCHC RBC AUTO-ENTMCNC: 33.5 G/DL (ref 31.4–37.4)
MCV RBC AUTO: 89 FL (ref 82–98)
MCV RBC AUTO: 90 FL (ref 82–98)
MCV RBC AUTO: 90 FL (ref 82–98)
MONOCYTES # BLD AUTO: 0.99 THOUSAND/ÂΜL (ref 0.17–1.22)
MONOCYTES # BLD AUTO: 1.36 THOUSAND/ÂΜL (ref 0.17–1.22)
MONOCYTES NFR BLD AUTO: 7 % (ref 4–12)
MONOCYTES NFR BLD AUTO: 8 % (ref 4–12)
NEUTROPHILS # BLD AUTO: 12.48 THOUSANDS/ÂΜL (ref 1.85–7.62)
NEUTROPHILS # BLD AUTO: 15.34 THOUSANDS/ÂΜL (ref 1.85–7.62)
NEUTS SEG NFR BLD AUTO: 87 % (ref 43–75)
NEUTS SEG NFR BLD AUTO: 88 % (ref 43–75)
NRBC BLD AUTO-RTO: 0 /100 WBCS
NRBC BLD AUTO-RTO: 0 /100 WBCS
PHOSPHATE SERPL-MCNC: 8.3 MG/DL (ref 2.7–4.5)
PLATELET # BLD AUTO: 384 THOUSANDS/UL (ref 149–390)
PLATELET # BLD AUTO: 386 THOUSANDS/UL (ref 149–390)
PLATELET # BLD AUTO: 402 THOUSANDS/UL (ref 149–390)
PMV BLD AUTO: 10.4 FL (ref 8.9–12.7)
PMV BLD AUTO: 10.5 FL (ref 8.9–12.7)
PMV BLD AUTO: 10.8 FL (ref 8.9–12.7)
POTASSIUM SERPL-SCNC: 3.8 MMOL/L (ref 3.5–5.3)
POTASSIUM SERPL-SCNC: 4.2 MMOL/L (ref 3.5–5.3)
PROT SERPL-MCNC: 5.2 G/DL (ref 6.4–8.4)
RBC # BLD AUTO: 2.18 MILLION/UL (ref 3.88–5.62)
RBC # BLD AUTO: 2.34 MILLION/UL (ref 3.88–5.62)
RBC # BLD AUTO: 2.44 MILLION/UL (ref 3.88–5.62)
SODIUM SERPL-SCNC: 142 MMOL/L (ref 135–147)
SODIUM SERPL-SCNC: 143 MMOL/L (ref 135–147)
UNIT DISPENSE STATUS: NORMAL
UNIT PRODUCT CODE: NORMAL
UNIT PRODUCT VOLUME: 350 ML
UNIT RH: NORMAL
WBC # BLD AUTO: 14.21 THOUSAND/UL (ref 4.31–10.16)
WBC # BLD AUTO: 17.07 THOUSAND/UL (ref 4.31–10.16)
WBC # BLD AUTO: 17.72 THOUSAND/UL (ref 4.31–10.16)

## 2022-11-19 RX ORDER — CALCIUM GLUCONATE 20 MG/ML
2 INJECTION, SOLUTION INTRAVENOUS ONCE
Status: COMPLETED | OUTPATIENT
Start: 2022-11-19 | End: 2022-11-19

## 2022-11-19 RX ORDER — POTASSIUM CHLORIDE 14.9 MG/ML
20 INJECTION INTRAVENOUS ONCE
Status: COMPLETED | OUTPATIENT
Start: 2022-11-19 | End: 2022-11-19

## 2022-11-19 RX ORDER — FENTANYL CITRATE 50 UG/ML
50 INJECTION, SOLUTION INTRAMUSCULAR; INTRAVENOUS
Status: DISCONTINUED | OUTPATIENT
Start: 2022-11-19 | End: 2022-11-23

## 2022-11-19 RX ADMIN — FENTANYL CITRATE 50 MCG: 50 INJECTION INTRAMUSCULAR; INTRAVENOUS at 22:00

## 2022-11-19 RX ADMIN — FENTANYL CITRATE 50 MCG: 50 INJECTION INTRAMUSCULAR; INTRAVENOUS at 14:27

## 2022-11-19 RX ADMIN — HYDROMORPHONE HYDROCHLORIDE 0.5 MG: 1 INJECTION, SOLUTION INTRAMUSCULAR; INTRAVENOUS; SUBCUTANEOUS at 19:16

## 2022-11-19 RX ADMIN — INSULIN GLARGINE 15 UNITS: 100 INJECTION, SOLUTION SUBCUTANEOUS at 00:09

## 2022-11-19 RX ADMIN — FENTANYL CITRATE 50 MCG: 50 INJECTION INTRAMUSCULAR; INTRAVENOUS at 00:09

## 2022-11-19 RX ADMIN — Medication 50 MCG/HR: at 01:48

## 2022-11-19 RX ADMIN — HYDROMORPHONE HYDROCHLORIDE 0.5 MG: 1 INJECTION, SOLUTION INTRAMUSCULAR; INTRAVENOUS; SUBCUTANEOUS at 23:12

## 2022-11-19 RX ADMIN — INSULIN LISPRO 2 UNITS: 100 INJECTION, SOLUTION INTRAVENOUS; SUBCUTANEOUS at 17:40

## 2022-11-19 RX ADMIN — HEPARIN SODIUM 5000 UNITS: 5000 INJECTION INTRAVENOUS; SUBCUTANEOUS at 00:09

## 2022-11-19 RX ADMIN — HEPARIN SODIUM 5000 UNITS: 5000 INJECTION INTRAVENOUS; SUBCUTANEOUS at 05:23

## 2022-11-19 RX ADMIN — ERTAPENEM SODIUM 1000 MG: 1 INJECTION, POWDER, LYOPHILIZED, FOR SOLUTION INTRAMUSCULAR; INTRAVENOUS at 18:27

## 2022-11-19 RX ADMIN — Medication 50 MCG/HR: at 16:18

## 2022-11-19 RX ADMIN — INSULIN LISPRO 2 UNITS: 100 INJECTION, SOLUTION INTRAVENOUS; SUBCUTANEOUS at 05:23

## 2022-11-19 RX ADMIN — INSULIN GLARGINE 15 UNITS: 100 INJECTION, SOLUTION SUBCUTANEOUS at 23:21

## 2022-11-19 RX ADMIN — FENTANYL CITRATE 50 MCG: 50 INJECTION INTRAMUSCULAR; INTRAVENOUS at 04:54

## 2022-11-19 RX ADMIN — CALCIUM GLUCONATE 2 G: 20 INJECTION, SOLUTION INTRAVENOUS at 06:21

## 2022-11-19 RX ADMIN — Medication 0.5 MCG/KG/HR: at 17:44

## 2022-11-19 RX ADMIN — HEPARIN SODIUM 5000 UNITS: 5000 INJECTION INTRAVENOUS; SUBCUTANEOUS at 13:32

## 2022-11-19 RX ADMIN — FENTANYL CITRATE 50 MCG: 50 INJECTION INTRAMUSCULAR; INTRAVENOUS at 12:15

## 2022-11-19 RX ADMIN — PANTOPRAZOLE SODIUM 40 MG: 40 INJECTION, POWDER, FOR SOLUTION INTRAVENOUS at 08:37

## 2022-11-19 RX ADMIN — POTASSIUM CHLORIDE 20 MEQ: 14.9 INJECTION, SOLUTION INTRAVENOUS at 06:21

## 2022-11-19 RX ADMIN — SODIUM CHLORIDE, SODIUM GLUCONATE, SODIUM ACETATE, POTASSIUM CHLORIDE AND MAGNESIUM CHLORIDE 100 ML/HR: 526; 502; 368; 37; 30 INJECTION, SOLUTION INTRAVENOUS at 17:44

## 2022-11-19 RX ADMIN — FENTANYL CITRATE 50 MCG: 50 INJECTION INTRAMUSCULAR; INTRAVENOUS at 08:40

## 2022-11-19 RX ADMIN — SODIUM CHLORIDE, SODIUM GLUCONATE, SODIUM ACETATE, POTASSIUM CHLORIDE AND MAGNESIUM CHLORIDE 100 ML/HR: 526; 502; 368; 37; 30 INJECTION, SOLUTION INTRAVENOUS at 07:58

## 2022-11-19 RX ADMIN — CHLORHEXIDINE GLUCONATE 15 ML: 1.2 SOLUTION ORAL at 08:37

## 2022-11-19 RX ADMIN — INSULIN LISPRO 2 UNITS: 100 INJECTION, SOLUTION INTRAVENOUS; SUBCUTANEOUS at 00:13

## 2022-11-19 RX ADMIN — HEPARIN SODIUM 5000 UNITS: 5000 INJECTION INTRAVENOUS; SUBCUTANEOUS at 22:02

## 2022-11-19 RX ADMIN — FENTANYL CITRATE 50 MCG: 50 INJECTION INTRAMUSCULAR; INTRAVENOUS at 23:45

## 2022-11-19 RX ADMIN — ALBUMIN (HUMAN) 25 G: 0.25 INJECTION, SOLUTION INTRAVENOUS at 10:18

## 2022-11-19 RX ADMIN — ALBUMIN (HUMAN) 25 G: 0.25 INJECTION, SOLUTION INTRAVENOUS at 04:54

## 2022-11-19 RX ADMIN — FENTANYL CITRATE 50 MCG: 50 INJECTION INTRAMUSCULAR; INTRAVENOUS at 18:37

## 2022-11-19 RX ADMIN — ACETAMINOPHEN 650 MG: 650 SUPPOSITORY RECTAL at 17:08

## 2022-11-19 RX ADMIN — FENTANYL CITRATE 50 MCG: 50 INJECTION INTRAMUSCULAR; INTRAVENOUS at 03:23

## 2022-11-19 RX ADMIN — CHLORHEXIDINE GLUCONATE 15 ML: 1.2 SOLUTION ORAL at 22:02

## 2022-11-19 NOTE — PROGRESS NOTES
Progress Note - Surgical Oncology  Darrion Grant 61 y o  male MRN: [id number]  Unit/Bed#: [facility] 517-01 Encounter: [id number]      Assessment:  62yo M w/ metastatic colon cancer who initially underwent an exploratory laparotomy, transverse colon resection, reversal of loop colostomy, peritoneal biopsy, and segment 3 hepatic resection on 11/7/22 w/ a post-operative course complicated by hypoxic respiratory failure, recurrent encephalopathy, and an anastomotic leak requiring an exploratory laparotomy, right hemicolectomy, and temporary abdominal closure on 11/16/22 w/ RTOR on 11/17/22 for restoration of intestinal continuity, formation of loop ileostomy, and abdominal closure  Afebrile w/ vitals WNL on 50% FiO2/PEEP8  Air trapping last evening w/ anemia this AM prompting pRBC transfusion  UOP: 705cc/24hrs  NGT: 350cc thin bilious fluid/24hrs  R abd VICTORINO: 0cc/24hrs  L abd VICTORINO: 100cc/24hrs (darker sanguineous)  VAC: 0cc/24hrs  Ileostomy: 25cc/24hrs  Stool x1 per rectum    Plan:  - Hgb 6 3 prompting pRBC transfusion w/ improvement to 7 1 this AM, transfuse PRN (for any evidence of continued blood loss, would recommend repeat CT w/ IV but given ALEXY would hold off for now)  - Wean ventilator per ICU team, appreciate assistance (currently on 50%, PEEP12)  - Will discuss enteral nutrition w/ surgical oncology attending this AM  - Continue abdominal VICTORINO drains to bulb suction  - Continue midline abdominal wound vac  - Albumin being administered scheduled in addition to IVFs (hemodynamics and creatinine improved this AM)  - Continue NG tube  - Continue routine ileostomy stoma care  - Continue Ertapenem per infectious disease for resistant E  Coli bacteremia    Subjective/Objective     Subjective:   Air trapping overnight and acute anemia prompting pRBC transfusion w/ appropriate Hgb response to 7 1 this AM  Patient remains intubated/sedated on precedex/fentanyl on volume control ventilation w/ an FiO2 50%/PEEP 12  Objective:     Blood pressure 96/58, pulse 67, temperature 98 1 °F (36 7 °C), resp  rate 14, height 5' 8" (1 727 m), weight 118 kg (259 lb 7 7 oz), SpO2 98 %  ,Body mass index is 39 45 kg/m²  Gen: Intubated/sedated on mechanical ventilation  Head: Normocephalic, atraumatic, NG tube in place w/ light/thin bilious fluid in collection  Neck: No obvious JVD, trachea midline  Cardiovascular: Regular rate  Respiratory: intubated on mechanical ventilation, PEEP12, FiO2 50%  Abd: Soft, minimally distended, and appropriately TTP surrounding midline abdominal wound vac w/ good seal/suction; R VICTORINO drain w/ scant fluid in collection; L VICTORINO w/ darker sanguineous effluent in collection; ileostomy pink/patent and productive of a scant amount of clear bowel sweat but no bowel movements  Extremities: No visible wounds/ulcerations to the B/L upper/lower extremities but w/ mild-moderate anasarca  Skin: Warm/dry  Psychiatric: Unable to assess    Intake/Output Summary (Last 24 hours) at 11/19/2022 0639  Last data filed at 11/19/2022 0400  Gross per 24 hour   Intake 3244 35 ml   Output 1180 ml   Net 2064 35 ml       Invasive Devices     Central Venous Catheter Line  Duration           Port A Cath 03/10/22 Right Chest 253 days          Peripheral Intravenous Line  Duration           Peripheral IV 11/14/22 Right;Ventral (anterior) Forearm 4 days    Peripheral IV 11/16/22 Left Antecubital 2 days    Peripheral IV 11/18/22 Left;Ventral (anterior) Forearm <1 day    Peripheral IV 11/18/22 Right Antecubital <1 day          Drain  Duration           Urethral Catheter 16 Fr  3 days    Closed/Suction Drain Lateral;Right Abdomen 2 days    Closed/Suction Drain Left Abdomen Bulb 19 Fr  2 days    NG/OG/Enteral Tube Nasogastric 2 days    Ileostomy LUQ 1 day          Airway  Duration           ETT  Cuffed; Hi-Lo 8 mm 2 days                I have personally reviewed pertinent lab results    , CBC:   Lab Results   Component Value Date    WBC 17 72 (H) 11/19/2022    HGB 7 1 (L) 11/19/2022    HCT 22 0 (L) 11/19/2022    MCV 90 11/19/2022     11/19/2022    MCH 29 1 11/19/2022    MCHC 32 3 11/19/2022    RDW 15 9 (H) 11/19/2022    MPV 10 5 11/19/2022    NRBC 0 11/19/2022   , CMP:   Lab Results   Component Value Date    SODIUM 143 11/19/2022    K 3 8 11/19/2022     (H) 11/19/2022    CO2 22 11/19/2022    BUN 56 (H) 11/19/2022    CREATININE 2 93 (H) 11/19/2022    CALCIUM 8 2 (L) 11/19/2022    EGFR 22 11/19/2022   , Coagulation: No results found for: PT, INR, APTT, Urinalysis: No results found for: COLORU, CLARITYU, SPECGRAV, PHUR, LEUKOCYTESUR, NITRITE, PROTEINUA, GLUCOSEU, KETONESU, BILIRUBINUR, BLOODU, Amylase: No results found for: AMYLASE, Lipase: No results found for: LIPASE

## 2022-11-19 NOTE — RESPIRATORY THERAPY NOTE
11/18/22 1900   Respiratory Assessment   Resp Comments RT called to bedside to assess pt due to prolonged desaturation  Pt tachypeic upon RT arrival, SpO2 90% on 100% FiO2, PEEP 12  Vent placed on stand by, filter changed  Upon being removed from vent, pt exhaled fully and SpO2 immediately increased to 98%  I-time decreased to 0 8 to extend E time  I:E ratio now 1:4 4  Waveform now normal, pt appears more comfortable  RT will continue to monitor  Vent Information   Vent ID [id number]   Vent type Benezett G5   Benezett C3/G5 Vent Mode (S)CMV   $ Pulse Oximetry Spot Check Charge Completed   SpO2 94 %   (S)CMV Settings   Resp Rate (BPM) 14 BPM   VT (mL) 450 mL   FIO2 (%) 100 %   PEEP (cmH2O) 12 cmH2O   I:E Ratio (S)  1:4 4   Insp Time (%) (S)  0 8 %  (pt airtrapping)   Flow Trigger (LPM) 5   Humidification Heater   Heater Temperature (Set) 95 °F (35 °C)   (S)CMV Actuals   Resp Rate (BPM) 25 BPM   VT (mL) 4436   MV 10 2   MAP (cmH2O) 9 cmH2O   Peak Pressure (cmH2O) 18 cmH2O   I:E Ratio (Obs) 1:2 3   Insp Resistance 10   Heater Temperature (Obs) 95 °F (35 °C)   Static Compliance (mL/cmH20) 31 2 mL/cmH2O   Plateau Pressure (cm H2O)   (unable to obtain)   (S)CMV Alarms   High Peak Pressure (cmH2O) 40   Low Pressure (cmH2O) 5 cm H2O   High Resp Rate (BPM) 40 BPM   Low Resp Rate (BPM) 8 BPM   High MV (L/min) 20 L/min   Low MV (L/min) 4 L/min   High VT (mL) 1000 mL   Low VT (mL) 350 mL   Leak (%) 0 %   Apnea Time (s) 20 S   Maintenance   Alarm (pink) cable attached No   Resuscitation bag with peep valve at bedside Yes   Water bag changed No   Circuit changed No   IHI Ventilator Associated Pneumonia Bundle   Daily Assessment of Readiness to Extubate Yes   Head of Bed Elevated HOB 30   ETT  Cuffed; Hi-Lo 8 mm   Placement Date/Time: 11/16/22 1240   Mask Ventilation: Mask ventilation not attempted (0)  Preoxygenated: Yes  Technique: Direct laryngoscopy;Rapid sequence;Stylet  Type: Cuffed; Hi-Lo  Tube Size: 8 mm  Laryngoscope: Mac  Blade Size: 3  Location: Oral      Secured at (cm) 24   Measured from Lips   Secured Location Right   Secured by Commercial tube leslie   Site Condition Dry   Cuff Pressure (cm H2O) 28 cm H2O   HI-LO Suction  Intermittent suction   HI-LO Secretions Scant   HI-LO Intervention Patent

## 2022-11-19 NOTE — PLAN OF CARE
Problem: MOBILITY - ADULT  Goal: Maintain or return to baseline ADL function  Description: INTERVENTIONS:  -  Assess patient's ability to carry out ADLs; assess patient's baseline for ADL function and identify physical deficits which impact ability to perform ADLs (bathing, care of mouth/teeth, toileting, grooming, dressing, etc )  - Assess/evaluate cause of self-care deficits   - Assess range of motion  - Assess patient's mobility; develop plan if impaired  - Assess patient's need for assistive devices and provide as appropriate  - Encourage maximum independence but intervene and supervise when necessary  - Involve family in performance of ADLs  - Assess for home care needs following discharge   - Consider OT consult to assist with ADL evaluation and planning for discharge  - Provide patient education as appropriate  Outcome: Progressing     Problem: Prexisting or High Potential for Compromised Skin Integrity  Goal: Skin integrity is maintained or improved  Description: INTERVENTIONS:  - Identify patients at risk for skin breakdown  - Assess and monitor skin integrity  - Assess and monitor nutrition and hydration status  - Monitor labs   - Assess for incontinence   - Turn and reposition patient  - Assist with mobility/ambulation  - Relieve pressure over bony prominences  - Avoid friction and shearing  - Provide appropriate hygiene as needed including keeping skin clean and dry  - Evaluate need for skin moisturizer/barrier cream  - Collaborate with interdisciplinary team   - Patient/family teaching  - Consider wound care consult   Outcome: Progressing     Problem: Nutrition/Hydration-ADULT  Goal: Nutrient/Hydration intake appropriate for improving, restoring or maintaining nutritional needs  Description: Monitor and assess patient's nutrition/hydration status for malnutrition  Collaborate with interdisciplinary team and initiate plan and interventions as ordered    Monitor patient's weight and dietary intake as ordered or per policy  Utilize nutrition screening tool and intervene as necessary  Determine patient's food preferences and provide high-protein, high-caloric foods as appropriate       INTERVENTIONS:  - Monitor oral intake, urinary output, labs, and treatment plans  - Assess nutrition and hydration status and recommend course of action  - Evaluate amount of meals eaten  - Assist patient with eating if necessary   - Allow adequate time for meals  - Recommend/ encourage appropriate diets, oral nutritional supplements, and vitamin/mineral supplements  - Order, calculate, and assess calorie counts as needed  - Recommend, monitor, and adjust tube feedings and TPN/PPN based on assessed needs  - Assess need for intravenous fluids  - Provide specific nutrition/hydration education as appropriate  - Include patient/family/caregiver in decisions related to nutrition  Outcome: Progressing     Problem: PAIN - ADULT  Goal: Verbalizes/displays adequate comfort level or baseline comfort level  Description: Interventions:  - Encourage patient to monitor pain and request assistance  - Assess pain using appropriate pain scale  - Administer analgesics based on type and severity of pain and evaluate response  - Implement non-pharmacological measures as appropriate and evaluate response  - Consider cultural and social influences on pain and pain management  - Notify physician/advanced practitioner if interventions unsuccessful or patient reports new pain  Outcome: Progressing     Problem: INFECTION - ADULT  Goal: Absence or prevention of progression during hospitalization  Description: INTERVENTIONS:  - Assess and monitor for signs and symptoms of infection  - Monitor lab/diagnostic results  - Monitor all insertion sites, i e  indwelling lines, tubes, and drains  - Monitor endotracheal if appropriate and nasal secretions for changes in amount and color  - [place] appropriate cooling/warming therapies per order  - Administer medications as ordered  - Instruct and encourage patient and family to use good hand hygiene technique  - Identify and instruct in appropriate isolation precautions for identified infection/condition  Outcome: Progressing  Goal: Absence of fever/infection during neutropenic period  Description: INTERVENTIONS:  - Monitor WBC    Outcome: Progressing     Problem: SAFETY ADULT  Goal: Maintain or return to baseline ADL function  Description: INTERVENTIONS:  -  Assess patient's ability to carry out ADLs; assess patient's baseline for ADL function and identify physical deficits which impact ability to perform ADLs (bathing, care of mouth/teeth, toileting, grooming, dressing, etc )  - Assess/evaluate cause of self-care deficits   - Assess range of motion  - Assess patient's mobility; develop plan if impaired  - Assess patient's need for assistive devices and provide as appropriate  - Encourage maximum independence but intervene and supervise when necessary  - Involve family in performance of ADLs  - Assess for home care needs following discharge   - Consider OT consult to assist with ADL evaluation and planning for discharge  - Provide patient education as appropriate  Outcome: Progressing  Goal: Maintains/Returns to pre admission functional level  Description: INTERVENTIONS:  - Perform BMAT or MOVE assessment daily    - Set and communicate daily mobility goal to care team and patient/family/caregiver  - Collaborate with rehabilitation services on mobility goals if consulted  - Perform Range of Motion 3 times a day  - Reposition patient every 2 hours    - Dangle patient 3 times a day  - Stand patient 3 times a day  - Ambulate patient 3 times a day  - Out of bed to chair 3 times a day   - Out of bed for meals 3 times a day  - Out of bed for toileting  - Record patient progress and toleration of activity level   Outcome: Progressing  Goal: Patient will remain free of falls  Description: INTERVENTIONS:  - Educate patient/family on patient safety including physical limitations  - Instruct patient to call for assistance with activity   - Consult OT/PT to assist with strengthening/mobility   - Keep Call bell within reach  - Keep bed low and locked with side rails adjusted as appropriate  - Keep care items and personal belongings within reach  - Initiate and maintain comfort rounds  - Make Fall Risk Sign visible to staff  - Offer Toileting every 2 Hours, in advance of need  - Initiate/Maintain bed alarm  - Obtain necessary fall  management equipment: bed alarm  - Apply yellow socks and bracelet for high fall risk patients  - Consider moving patient to room near nurses station  Outcome: Progressing     Problem: DISCHARGE PLANNING  Goal: Discharge to home or other facility with appropriate resources  Description: INTERVENTIONS:  - Identify barriers to discharge w/patient and caregiver  - Arrange for needed discharge resources and transportation as appropriate  - Identify discharge learning needs (meds, wound care, etc )  - Arrange for interpretive services to assist at discharge as needed  - Refer to Case Management Department for coordinating discharge planning if the patient needs post-hospital services based on physician/advanced practitioner order or complex needs related to functional status, cognitive ability, or social support system  Outcome: Progressing     Problem: Knowledge Deficit  Goal: Patient/family/caregiver demonstrates understanding of disease process, treatment plan, medications, and discharge instructions  Description: Complete learning assessment and assess knowledge base    Interventions:  - Provide teaching at level of understanding  - Provide teaching via preferred learning methods  Outcome: Progressing     Problem: SAFETY,RESTRAINT: NV/NON-SELF DESTRUCTIVE BEHAVIOR  Goal: Remains free of harm/injury (restraint for non violent/non self-detsructive behavior)  Description: INTERVENTIONS:  - Instruct patient/family regarding restraint use   - Assess and monitor physiologic and psychological status   - Provide interventions and comfort measures to meet assessed patient needs   - Identify and implement measures to help patient regain control  - Assess readiness for release of restraint   Outcome: Progressing  Goal: Returns to optimal restraint-free functioning  Description: INTERVENTIONS:  - Assess the patient's behavior and symptoms that indicate continued need for restraint  - Identify and implement measures to help patient regain control  - Assess readiness for release of restraint   Outcome: Progressing

## 2022-11-19 NOTE — PROGRESS NOTES
Daily Progress Note - Critical Care   Demetrio Zamoar 61 y o  male MRN: [id number]  Unit/Bed#: [facility] 517-01 Encounter: [id number]        ----------------------------------------------------------------------------------------  24hr events: Overnight patient was intermittently hypoxic and hypotensive  Repeat CXR showing left sided pleural effusion  Vent adjusted and placed on pressure control and SpO2 since normalized  Given albumin for hypotension with resolution  Hgb drop to 6 3 given 1 U pRBC with appropriate response      ---------------------------------------------------------------------------------------  SUBJECTIVE  Unable to assess secondary to ETT      Review of Systems   Unable to perform ROS: Intubated     Review of systems was unable to be performed secondary to ETT  ---------------------------------------------------------------------------------------  Assessment and Plan:    Neuro:   • Diagnosis: Acute post operative pain/sedation  o Plan:   - Fentanyl, precedex  - Patient pain likely contributing to poor inspiratory effort and difficulty with extubation, could consider further pain adjuncts  • Diagnosis: Acute metabolic encephalopathy  o Plan:   - Frequent neuro checks      CV:   • Diagnosis: Hypotension  o Unclear etiology at this point  o Plan:   - Given albumin overnight (12 5g 5% 3x) in addition to 25 g 25% q6 for 4 doses with appropriate response   - Continue to resuscitate as needed  - Lactic normal, trend remaining labs  - Previous echo 11/9 with 55% LVEF, consider repeat if hypotension does not resolve  - Continue abx treatment as below  • Diagnosis: Paroxysmal atrial tachycardia  o Plan:   - Continuous cardiac monitoring  - Replete lytes as below  • Diagnosis: History of HTN  o Plan:   - Holding home amlodipine and lisinopril  • Diagnosis: History of HLD  o Plan:  - Holding atorvastatin while NPO      Pulm:  • Diagnosis: Acute hypoxic ventilatory dependent respiratory failure  o Worsened with new left sided pleural effusion versus compressive atelectasis  o Intubated 11/16  o Plan:   - Repeat CXR this am pending  - Consider bronch versus left thoracentesis for pleural effusion  - Wean vent as able, currently on pressure control  - SBT  • Diagnosis: Pleural effusion  o Versus compressive atelectasis  o Increasing on CXR 11/18  o Plan:   - As above      GI:   • Diagnosis: Metastatic colon cancer  ? S/p ex lap, transverse colon resection, reversal of loop colostomy, peritoneal biopsy and segment 3 liver resection on 11/7  ? Anastomotic leak s/p ex lap, R hemicolectomy 11/16  ?  S/p diverting loop ileostomy 11/17  o Plan:  - Monitor drain/VAC output  - Serial abdominal exams  - General surgery following appreciate recs      :   • Diagnosis: ALEXY on stage 3 CKD  o Likely in setting of ATN, sepsis  o Plan:   - Cr downtrending this am after fluid resuscitation however still up from previous, phos stable  - UOP remains poor  - No indication for CRRT at this point but consider if continued worsening  - Strict I&Os      F/E/N:   • Plan:   o F- isolyte @ 100/hr  o E- monitor and replete as needed, goal Mag >2, Phos >3, K >4  o N- NPO      Heme/Onc:   • Diagnosis: Acute blood loss anemia  o Plan:   - Hgb drop overnight, given 1U pRBC, possibly dilutional  - Consider checking again this afternoon  - Monitor for s/s bleeding  • Diagnosis: DVT ppx  o Plan:   - SQH      Endo:   • Diagnosis: T2DM with hyperglycemia  o Plan:   - SSI algorithm 4  - Lantus 15 U at bedtime  - Goal -180      ID:   • Diagnosis: Severe sepsis, ESBL bacteremia  o In setting of anastomotic leak  o Plan:   - Repeat bcx 11/17 with no growth at 24 hours  - On ertapenem, will continue  - ID following, appreciate recs      MSK/Skin:   • Diagnosis: No acute issues  o Plan:   - Frequent turns/reposition  - PT/OT when able  - Surgical site care as per general surgery      Patient appropriate for transfer out of the ICU today?: No  Disposition: Continue Critical Care   Code Status: Level 1 - Full Code  ---------------------------------------------------------------------------------------  ICU CORE MEASURES    Prophylaxis   VTE Pharmacologic Prophylaxis: Heparin  VTE Mechanical Prophylaxis: sequential compression device  Stress Ulcer Prophylaxis: Prophylaxis Not Indicated     ABCDE Protocol (if indicated)  Plan to perform spontaneous awakening trial today? Yes  Plan to perform spontaneous breathing trial today? Yes  Obvious barriers to extubation? Yes    Invasive Devices Review  Invasive Devices     Central Venous Catheter Line  Duration           Port A Cath 03/10/22 Right Chest 253 days          Peripheral Intravenous Line  Duration           Peripheral IV 22 Right;Ventral (anterior) Forearm 4 days    Peripheral IV 22 Left Antecubital 2 days    Peripheral IV 22 Left;Ventral (anterior) Forearm <1 day    Peripheral IV 22 Right Antecubital <1 day          Drain  Duration           Urethral Catheter 16 Fr  3 days    Closed/Suction Drain Lateral;Right Abdomen 2 days    Closed/Suction Drain Left Abdomen Bulb 19 Fr  2 days    NG/OG/Enteral Tube Nasogastric 2 days    Ileostomy LUQ 1 day          Airway  Duration           ETT  Cuffed; Hi-Lo 8 mm 2 days              Can any invasive devices be discontinued today?  No  ---------------------------------------------------------------------------------------  OBJECTIVE    Vitals   Vitals:    22 0300 22 0400 22 0428 22 0500   BP: 104/62 99/59  96/58   Pulse: 67 69  67   Resp: 15 14  14   Temp: 97 9 °F (36 6 °C) 98 1 °F (36 7 °C)  98 1 °F (36 7 °C)   TempSrc:       SpO2: 98% 98% 98% 98%   Weight:       Height:         Temp (24hrs), Av 3 °F (37 4 °C), Min:97 9 °F (36 6 °C), Max:100 8 °F (38 2 °C)  Current: Temperature: 98 1 °F (36 7 °C)    Respiratory:  SpO2: SpO2: 98 %  Nasal Cannula O2 Flow Rate (L/min): 6 L/min    Invasive/non-invasive ventilation settings Respiratory    Lab Data (Last 4 hours)    None         O2/Vent Data (Last 4 hours)    None                Physical Exam  Constitutional:       Appearance: He is obese  He is ill-appearing  Interventions: He is sedated and intubated  HENT:      Head: Normocephalic and atraumatic  Right Ear: External ear normal       Left Ear: External ear normal       Nose: Nose normal       Mouth/Throat:      Mouth: Mucous membranes are moist    Eyes:      General: No scleral icterus  Right eye: No discharge  Left eye: No discharge  Conjunctiva/sclera: Conjunctivae normal    Cardiovascular:      Rate and Rhythm: Normal rate and regular rhythm  Heart sounds: Normal heart sounds  No murmur heard  No friction rub  No gallop  Pulmonary:      Effort: He is intubated  Breath sounds: Examination of the right-middle field reveals decreased breath sounds  Examination of the left-middle field reveals decreased breath sounds  Examination of the right-lower field reveals decreased breath sounds  Examination of the left-lower field reveals decreased breath sounds  Decreased breath sounds present  Abdominal:      General: Abdomen is flat  Comments: Wound vac in place, clean, dry, intact  Drains with minimal sanginous output  Ostomy pink  Musculoskeletal:      Right lower leg: Edema present  Left lower leg: Edema present  Comments: Peripheral edema   Neurological:      General: No focal deficit present               Laboratory and Diagnostics:  Results from last 7 days   Lab Units 11/19/22  0500 11/19/22  0041 11/18/22  2241 11/18/22  0447 11/17/22  1603 11/17/22  0423 11/16/22  2328 11/16/22  1546 11/16/22  1343 11/16/22  0430 11/13/22  1230 11/13/22  0442   WBC Thousand/uL 17 72*  --  17 07* 27 12* 22 72* 22 89* 29 65* 25 84*  --  21 07*   < > 15 65*   HEMOGLOBIN g/dL 7 1* 6 3* 6 4* 8 1* 8 8* 8 4* 10 1* 10 7*  --  7 3*   < > 6 7*   I STAT HEMOGLOBIN   --   --   --   --   --   -- --   --    < >  --   --   --    HEMATOCRIT % 22 0*  --  19 7* 24 9* 27 2* 25 4* 31 0* 33 9*  --  22 5*   < > 20 3*   HEMATOCRIT, ISTAT   --   --   --   --   --   --   --   --    < >  --   --   --    PLATELETS Thousands/uL 386  --  384 410* 457* 346 452* 454*  --  340   < > 243   NEUTROS PCT % 87*  --   --  87*  --  85* 88*  --   --  83*  --   --    BANDS PCT %  --   --   --   --   --   --   --  9*  --   --   --   --    MONOS PCT % 8  --   --  8  --  8 6  --   --  6  --   --    MONO PCT %  --   --   --   --   --   --   --  0*  --   --   --  13*    < > = values in this interval not displayed  Results from last 7 days   Lab Units 11/19/22  0500 11/19/22  0041 11/18/22  2122 11/18/22  0447 11/17/22  1603 11/17/22  0423 11/16/22  2328 11/16/22  1546 11/15/22  0517 11/14/22  0545 11/13/22  0442 11/12/22  0604   SODIUM mmol/L 143 142 142 141 139 140 141 143   < > 143 142 143   POTASSIUM mmol/L 3 8 4 2 4 2 4 8 4 9 4 8 5 0 4 2   < > 3 6 3 7 3 9   CHLORIDE mmol/L 110* 110* 111* 109* 110* 111* 112* 113*   < > 111* 112* 111*   CO2 mmol/L 22 23 21 21 23 23 22 22   < > 25 26 23   CO2, I-STAT   --   --   --   --   --   --   --   --    < >  --   --   --    ANION GAP mmol/L 11 9 10 11 6 6 7 8   < > 7 4 9   BUN mg/dL 56* 71* 70* 63* 52* 44* 38* 31*   < > 35* 45* 39*   CREATININE mg/dL 2 93* 3 14* 3 19* 2 91* 2 63* 2 17* 1 88* 1 33*   < > 1 26 1 48* 1 59*   CALCIUM mg/dL 8 2* 8 4 8 1* 7 9* 7 8* 7 7* 7 6* 7 5*   < > 8 2* 8 1* 8 2*   GLUCOSE RANDOM mg/dL 159* 166* 151* 175* 199* 220* 195* 177*   < > 192* 197* 214*   ALT U/L  --   --   --  13  --  18  --  23  --  46 61 62   AST U/L  --   --   --  53*  --  38  --  40  --  49* 69* 65*   ALK PHOS U/L  --   --   --  163*  --  158*  --  220*  --  228* 265* 304*   ALBUMIN g/dL  --   --   --  1 6*  --  2 0*  --  1 4*  --  1 7* 1 8* 1 8*   TOTAL BILIRUBIN mg/dL  --   --   --  2 85*  --  2 20*  --  1 53*  --  1 18* 1 08* 1 33*    < > = values in this interval not displayed       Results from last 7 days   Lab Units 11/19/22  0500 11/18/22 2122 11/18/22  0447 11/17/22  0423 11/16/22  2328 11/16/22  1546 11/15/22  0517   MAGNESIUM mg/dL 2 9* 2 9* 2 6 2 3 2 2 2 2 2 4   PHOSPHORUS mg/dL 8 3* 8 1* 8 7* 7 3* 6 3* 5 0* 3 9      Results from last 7 days   Lab Units 11/12/22  0604   INR  1 10          Results from last 7 days   Lab Units 11/18/22 2122 11/17/22  1603 11/16/22 2328 11/16/22  1546 11/16/22  0430   LACTIC ACID mmol/L 1 3 1 5 1 6 1 4 1 6     ABG:  Results from last 7 days   Lab Units 11/18/22 2102   PH ART  7 424   PCO2 ART mm Hg 34 6*   PO2 ART mm Hg 133 7*   HCO3 ART mmol/L 22 1   BASE EXC ART mmol/L -2 0   ABG SOURCE  Radial, Left     VBG:  Results from last 7 days   Lab Units 11/18/22 2102 11/16/22  1547 11/16/22  0430   PH RUAM   --   --  7 420*   PCO2 RUMA mm Hg  --   --  40 1*   PO2 RUMA mm Hg  --   --  39 0   HCO3 RUMA mmol/L  --   --  25 4   BASE EXC RUMA mmol/L  --   --  0 9   ABG SOURCE  Radial, Left   < >  --     < > = values in this interval not displayed  Micro  Results from last 7 days   Lab Units 11/17/22  0016 11/16/22  1315 11/16/22  1314   BLOOD CULTURE  No Growth at 24 hrs  No Growth at 24 hrs   --   --    GRAM STAIN RESULT   --  No Polys or Bacteria seen No Polys or Bacteria seen       Imaging:  I have personally reviewed pertinent reports  Intake and Output  I/O       11/17 0701 11/18 0700 11/18 0701 11/19 0700    P  O  0 0    I V  (mL/kg) 4479 6 (38) 2144 4 (18 2)    NG/GT 0 0    IV Piggyback 450 1100    Total Intake(mL/kg) 4929 6 (41 8) 3244 4 (27 5)    Urine (mL/kg/hr) 655 (0 2) 705 (0 2)    Emesis/NG output 150 350    Drains 750 100    Stool 50 25    Total Output 1605 1180    Net +3324 6 +2064 4          Unmeasured Stool Occurrence  1 x          Height and Weights   Height: 5' 8" (172 7 cm)  IBW (Ideal Body Weight): 68 4 kg  Body mass index is 39 45 kg/m²    Weight (last 2 days)     None            Nutrition       Diet Orders   (From admission, onward) Start     Ordered    11/16/22 0823  Diet NPO  Diet effective now        References:    Nutrtion Support Algorithm Enteral vs  Parenteral   Question Answer Comment   Diet Type NPO    RD to adjust diet per protocol?  No        11/16/22 5171                  Active Medications  Scheduled Meds:  Current Facility-Administered Medications   Medication Dose Route Frequency Provider Last Rate   • acetaminophen  650 mg Rectal Q4H PRN GAURANG Main     • albumin human  25 g Intravenous Q6H Erika Magallanes PA-C 0 g (11/19/22 0000)   • chlorhexidine  15 mL Mouth/Throat Q12H [address] GAURANG Main     • dexmedetomidine  0 1-0 7 mcg/kg/hr Intravenous Titrated Marina Guevara MD 0 5 mcg/kg/hr (11/19/22 0110)   • ertapenem  1,000 mg Intravenous Q24H GAURANG Main Stopped (11/18/22 1954)   • fentaNYL  50 mcg/hr Intravenous Continuous Alicia Faye DO 50 mcg/hr (11/19/22 0148)   • fentanyl citrate (PF)  50 mcg Intravenous Q1H PRN GAURANG Main     • heparin (porcine)  5,000 Units Subcutaneous [facility] GAURANG Main     • HYDROmorphone  0 5 mg Intravenous Q3H PRN Jhon Luo MD     • insulin glargine  15 Units Subcutaneous HS GAURANG Main     • insulin lispro  2-12 Units Subcutaneous Q6H [address] GAURANG Main     • multi-electrolyte  100 mL/hr Intravenous Continuous Erika Magallanes PA-C 100 mL/hr (11/18/22 2250)   • ondansetron  4 mg Intravenous Q4H PRN GAURANG Main     • pantoprazole  40 mg Intravenous Q24H [address] GAURANG Acharya       Continuous Infusions:  dexmedetomidine, 0 1-0 7 mcg/kg/hr, Last Rate: 0 5 mcg/kg/hr (11/19/22 0110)  fentaNYL, 50 mcg/hr, Last Rate: 50 mcg/hr (11/19/22 0148)  multi-electrolyte, 100 mL/hr, Last Rate: 100 mL/hr (11/18/22 2250)      PRN Meds:   acetaminophen, 650 mg, Q4H PRN  fentanyl citrate (PF), 50 mcg, Q1H PRN  HYDROmorphone, 0 5 mg, Q3H PRN  ondansetron, 4 mg, Q4H PRN        Allergies   Allergies   Allergen Reactions   • Shellfish-Derived Products - Food Allergy Anaphylaxis   • Erythromycin GI Intolerance     ---------------------------------------------------------------------------------------  Advance Directive and Living Will:      Power of :    POLST:    ---------------------------------------------------------------------------------------  Care Time Delivered:   No Critical Care time spent     Liesameerenstein, DO      Portions of the record may have been created with voice recognition software  Occasional wrong word or "sound a like" substitutions may have occurred due to the inherent limitations of voice recognition software    Read the chart carefully and recognize, using context, where substitutions have occurred

## 2022-11-19 NOTE — RESPIRATORY THERAPY NOTE
RT Ventilator Management Note  Radha Marshall 61 y o  male MRN: [id number]  Unit/Bed#: [facility] 517-01 Encounter: [id number]      Daily Screen         11/18/2022  1138 11/19/2022  0818          Patient safety screen outcome[de-identified] Passed Failed      Not Ready for Weaning due to[de-identified] -- PEEP > 8cmH2O      Spont breathing trial % for 30 min: Yes --                Physical Exam:   Assessment Type: Assess only  General Appearance: Sleeping  Respiratory Pattern: Assisted  Chest Assessment: Chest expansion symmetrical  Bilateral Breath Sounds: Clear  Suction: ET Tube  O2 Device: vent      Resp Comments: (P) Received on documented vent settings  ETT respositioned to prevent skini breakdown  Pt not placed nop SBT due to PEEP 0f 12   Suctioned for moderate amt of yello thick secretions

## 2022-11-19 NOTE — PROGRESS NOTES
Progress Note - Infectious Disease   Merritt Whiting 61 y o  male MRN: [id number]  Unit/Bed#: [facility] 517-01 Encounter: [id number]      Impression/Recommendations:  1  Sepsis, secondary to intra-abdominal infection and bacteremia   Patient is clinically much improved on antibiotic and status post I&D/bowel resection   Temperature up in early postop period, but down again  [facility] trending down  Antibiotic plan as in below  Monitor temperature/WBC  Monitor hemodynamics      2  ESBL producing E coli bacteremia, likely secondary to gut translocation during recent colon surgery   Patient is clinically improved   Bacteremia cleared   Patient completed antibiotic course for this but given probable anastomotic leak, will continue antibiotic for now  No further antibiotic needed for this      3  Anastomotic leak up with intra-abdominal abscess/infected hematoma   Patient is status post exploratory laparotomy, with drainage of abscess and cecum resection  Operative culture growing ESBL producing E coli again, with lactobacillus  Patient had repeat ex lap, with healthy-appearing bowel  Continue IV ertapenem for now  Treat x7 days postop      4  Metastatic colon cancer, with liver metastases   Patient is status post transverse colon resection  Surgical oncology follow-up      5  ALEXY, superimposed on CKD   Creatinine was improving but now worsening postop  Antibiotic at full dose for now  Monitor creatinine   If creatinine continues to rise, will need to decrease antibiotic dose      6  Acute hypoxic respiratory failure, previously on ventilator   Patient was extubated but now intubated again postop   No clinical pneumonia  Monitor respiratory status      7  Encephalopathy, multifactorial   Mental status was improved but now sedated on ventilator  He is responsive on ventilator  Monitor mental status      8  Type 2 DM      Discussed with patient      Antibiotics:  Ertapenem # 11  POD # 3/2     Subjective:  Patient is awake and alert, on ventilator, in ICU  He attempts to communicate  Temperature stays down  He is tolerating antibiotic well   No nausea, vomiting or diarrhea      Objective:  Vitals:  Temp:  [97 9 °F (36 6 °C)-100 8 °F (38 2 °C)] 98 8 °F (37 1 °C)  HR:  [] 70  Resp:  [14-31] 15  BP: ()/(47-74) 97/52  SpO2:  [85 %-100 %] 99 %  Temp (24hrs), Av 1 °F (37 3 °C), Min:97 9 °F (36 6 °C), Max:100 8 °F (38 2 °C)  Current: Temperature: 98 8 °F (37 1 °C)    Physical Exam:     General: Awake, alert, attempts to communicate, comfortable, nontoxic  Neck:  Supple  No mass  No lymphadenopathy  Lungs: Expansion symmetric, no rales, no wheezing  Heart:  Regular rate and rhythm, S1 and S2 normal, no murmur  Abdomen: Soft, nondistended, wound with VAC, no purulence, no erythema/warmth  Mild tenderness  Extremities: 1+ leg edema  No erythema/warmth  No ulcer  Nontender to palpation  Skin:  No rash  Neuro: Moves all extremities  Invasive Devices     Central Venous Catheter Line  Duration           Port A Cath 03/10/22 Right Chest 253 days          Peripheral Intravenous Line  Duration           Peripheral IV 22 Left Antecubital 2 days    Peripheral IV 22 Left;Ventral (anterior) Forearm <1 day    Peripheral IV 22 Right Antecubital <1 day          Drain  Duration           Urethral Catheter 16 Fr  3 days    Closed/Suction Drain Lateral;Right Abdomen 2 days    Closed/Suction Drain Left Abdomen Bulb 19 Fr  2 days    NG/OG/Enteral Tube Nasogastric 2 days    Ileostomy LUQ 1 day          Airway  Duration           ETT  Cuffed; Hi-Lo 8 mm 2 days                Labs studies:   I have personally reviewed pertinent labs    Results from last 7 days   Lab Units 22  0500 22  0041 22  2122 22  0447 22  1603 22  0423 22  1546 22  1343   POTASSIUM mmol/L 3 8 4 2 4 2 4 8   < > 4 8   < >  --    CHLORIDE mmol/L 110* 110* 111* 109*   < > 111*   < >  -- CO2 mmol/L 22 23 21 21   < > 23   < >  --    CO2, I-STAT mmol/L  --   --   --   --   --   --   --  27   BUN mg/dL 56* 71* 70* 63*   < > 44*   < >  --    CREATININE mg/dL 2 93* 3 14* 3 19* 2 91*   < > 2 17*   < >  --    EGFR ml/min/1 73sq m 22 20 20 22   < > 32   < >  --    GLUCOSE, ISTAT mg/dl  --   --   --   --   --   --   --  151*   CALCIUM mg/dL 8 2* 8 4 8 1* 7 9*   < > 7 7*   < >  --    AST U/L 58*  --   --  53*  --  38   < >  --    ALT U/L 12  --   --  13  --  18   < >  --    ALK PHOS U/L 141*  --   --  163*  --  158*   < >  --     < > = values in this interval not displayed  Results from last 7 days   Lab Units 11/19/22  0500 11/19/22  0041 11/18/22  2241 11/18/22  0447   WBC Thousand/uL 17 72*  --  17 07* 27 12*   HEMOGLOBIN g/dL 7 1* 6 3* 6 4* 8 1*   PLATELETS Thousands/uL 386  --  384 410*     Results from last 7 days   Lab Units 11/17/22  0016 11/16/22  1315 11/16/22  1314   BLOOD CULTURE  No Growth at 48 hrs  No Growth at 48 hrs   --   --    GRAM STAIN RESULT   --  No Polys or Bacteria seen No Polys or Bacteria seen       Imaging Studies:   I have personally reviewed pertinent imaging study reports and images in PACS  EKG, Pathology, and Other Studies:   I have personally reviewed pertinent reports

## 2022-11-20 LAB
ANION GAP SERPL CALCULATED.3IONS-SCNC: 8 MMOL/L (ref 4–13)
BASOPHILS # BLD AUTO: 0.02 THOUSANDS/ÂΜL (ref 0–0.1)
BASOPHILS NFR BLD AUTO: 0 % (ref 0–1)
BUN SERPL-MCNC: 65 MG/DL (ref 5–25)
CALCIUM SERPL-MCNC: 7.7 MG/DL (ref 8.3–10.1)
CHLORIDE SERPL-SCNC: 115 MMOL/L (ref 96–108)
CO2 SERPL-SCNC: 24 MMOL/L (ref 21–32)
CREAT SERPL-MCNC: 2.34 MG/DL (ref 0.6–1.3)
EOSINOPHIL # BLD AUTO: 0 THOUSAND/ÂΜL (ref 0–0.61)
EOSINOPHIL NFR BLD AUTO: 0 % (ref 0–6)
ERYTHROCYTE [DISTWIDTH] IN BLOOD BY AUTOMATED COUNT: 16.3 % (ref 11.6–15.1)
GFR SERPL CREATININE-BSD FRML MDRD: 29 ML/MIN/1.73SQ M
GLUCOSE SERPL-MCNC: 138 MG/DL (ref 65–140)
GLUCOSE SERPL-MCNC: 140 MG/DL (ref 65–140)
GLUCOSE SERPL-MCNC: 152 MG/DL (ref 65–140)
GLUCOSE SERPL-MCNC: 153 MG/DL (ref 65–140)
GLUCOSE SERPL-MCNC: 161 MG/DL (ref 65–140)
GLUCOSE SERPL-MCNC: 181 MG/DL (ref 65–140)
HCT VFR BLD AUTO: 24 % (ref 36.5–49.3)
HGB BLD-MCNC: 7.7 G/DL (ref 12–17)
IMM GRANULOCYTES # BLD AUTO: 0.11 THOUSAND/UL (ref 0–0.2)
IMM GRANULOCYTES NFR BLD AUTO: 1 % (ref 0–2)
LYMPHOCYTES # BLD AUTO: 0.6 THOUSANDS/ÂΜL (ref 0.6–4.47)
LYMPHOCYTES NFR BLD AUTO: 5 % (ref 14–44)
MAGNESIUM SERPL-MCNC: 2.9 MG/DL (ref 1.6–2.6)
MCH RBC QN AUTO: 29.4 PG (ref 26.8–34.3)
MCHC RBC AUTO-ENTMCNC: 32.1 G/DL (ref 31.4–37.4)
MCV RBC AUTO: 92 FL (ref 82–98)
MONOCYTES # BLD AUTO: 1.05 THOUSAND/ÂΜL (ref 0.17–1.22)
MONOCYTES NFR BLD AUTO: 9 % (ref 4–12)
NEUTROPHILS # BLD AUTO: 9.54 THOUSANDS/ÂΜL (ref 1.85–7.62)
NEUTS SEG NFR BLD AUTO: 85 % (ref 43–75)
NRBC BLD AUTO-RTO: 0 /100 WBCS
PHOSPHATE SERPL-MCNC: 5.5 MG/DL (ref 2.7–4.5)
PLATELET # BLD AUTO: 467 THOUSANDS/UL (ref 149–390)
PMV BLD AUTO: 10.8 FL (ref 8.9–12.7)
POTASSIUM SERPL-SCNC: 3.7 MMOL/L (ref 3.5–5.3)
RBC # BLD AUTO: 2.62 MILLION/UL (ref 3.88–5.62)
SODIUM SERPL-SCNC: 147 MMOL/L (ref 135–147)
WBC # BLD AUTO: 11.32 THOUSAND/UL (ref 4.31–10.16)

## 2022-11-20 PROCEDURE — 2W03X6Z CHANGE PRESSURE DRESSING ON ABDOMINAL WALL: ICD-10-PCS | Performed by: STUDENT IN AN ORGANIZED HEALTH CARE EDUCATION/TRAINING PROGRAM

## 2022-11-20 RX ORDER — ATORVASTATIN CALCIUM 40 MG/1
40 TABLET, FILM COATED ORAL
Status: DISCONTINUED | OUTPATIENT
Start: 2022-11-20 | End: 2022-11-23

## 2022-11-20 RX ORDER — POTASSIUM CHLORIDE 14.9 MG/ML
20 INJECTION INTRAVENOUS ONCE
Status: COMPLETED | OUTPATIENT
Start: 2022-11-20 | End: 2022-11-20

## 2022-11-20 RX ORDER — FUROSEMIDE 10 MG/ML
20 INJECTION INTRAMUSCULAR; INTRAVENOUS ONCE
Status: COMPLETED | OUTPATIENT
Start: 2022-11-20 | End: 2022-11-20

## 2022-11-20 RX ORDER — POTASSIUM CHLORIDE 20MEQ/15ML
20 LIQUID (ML) ORAL ONCE
Status: COMPLETED | OUTPATIENT
Start: 2022-11-20 | End: 2022-11-20

## 2022-11-20 RX ADMIN — HEPARIN SODIUM 5000 UNITS: 5000 INJECTION INTRAVENOUS; SUBCUTANEOUS at 05:10

## 2022-11-20 RX ADMIN — FENTANYL CITRATE 50 MCG: 50 INJECTION INTRAMUSCULAR; INTRAVENOUS at 05:08

## 2022-11-20 RX ADMIN — FENTANYL CITRATE 50 MCG: 50 INJECTION INTRAMUSCULAR; INTRAVENOUS at 15:40

## 2022-11-20 RX ADMIN — Medication 0.7 MCG/KG/HR: at 10:13

## 2022-11-20 RX ADMIN — INSULIN GLARGINE 15 UNITS: 100 INJECTION, SOLUTION SUBCUTANEOUS at 21:40

## 2022-11-20 RX ADMIN — Medication 0.7 MCG/KG/HR: at 00:22

## 2022-11-20 RX ADMIN — ATORVASTATIN CALCIUM 40 MG: 40 TABLET, FILM COATED ORAL at 18:22

## 2022-11-20 RX ADMIN — Medication 0.7 MCG/KG/HR: at 21:38

## 2022-11-20 RX ADMIN — CHLORHEXIDINE GLUCONATE 15 ML: 1.2 SOLUTION ORAL at 08:16

## 2022-11-20 RX ADMIN — FENTANYL CITRATE 50 MCG: 50 INJECTION INTRAMUSCULAR; INTRAVENOUS at 12:39

## 2022-11-20 RX ADMIN — HYDROMORPHONE HYDROCHLORIDE 0.5 MG: 1 INJECTION, SOLUTION INTRAMUSCULAR; INTRAVENOUS; SUBCUTANEOUS at 17:08

## 2022-11-20 RX ADMIN — Medication 0.7 MCG/KG/HR: at 16:42

## 2022-11-20 RX ADMIN — CHLORHEXIDINE GLUCONATE 15 ML: 1.2 SOLUTION ORAL at 21:38

## 2022-11-20 RX ADMIN — ERTAPENEM SODIUM 1000 MG: 1 INJECTION, POWDER, LYOPHILIZED, FOR SOLUTION INTRAMUSCULAR; INTRAVENOUS at 18:22

## 2022-11-20 RX ADMIN — INSULIN LISPRO 2 UNITS: 100 INJECTION, SOLUTION INTRAVENOUS; SUBCUTANEOUS at 06:01

## 2022-11-20 RX ADMIN — FENTANYL CITRATE 50 MCG: 50 INJECTION INTRAMUSCULAR; INTRAVENOUS at 06:20

## 2022-11-20 RX ADMIN — Medication 0.7 MCG/KG/HR: at 05:09

## 2022-11-20 RX ADMIN — INSULIN LISPRO 2 UNITS: 100 INJECTION, SOLUTION INTRAVENOUS; SUBCUTANEOUS at 18:28

## 2022-11-20 RX ADMIN — HEPARIN SODIUM 5000 UNITS: 5000 INJECTION INTRAVENOUS; SUBCUTANEOUS at 21:38

## 2022-11-20 RX ADMIN — FUROSEMIDE 20 MG: 10 INJECTION, SOLUTION INTRAMUSCULAR; INTRAVENOUS at 13:50

## 2022-11-20 RX ADMIN — SODIUM CHLORIDE, SODIUM GLUCONATE, SODIUM ACETATE, POTASSIUM CHLORIDE AND MAGNESIUM CHLORIDE 100 ML/HR: 526; 502; 368; 37; 30 INJECTION, SOLUTION INTRAVENOUS at 03:04

## 2022-11-20 RX ADMIN — PANTOPRAZOLE SODIUM 40 MG: 40 INJECTION, POWDER, FOR SOLUTION INTRAVENOUS at 08:16

## 2022-11-20 RX ADMIN — Medication 50 MCG/HR: at 12:41

## 2022-11-20 RX ADMIN — ACETAMINOPHEN 650 MG: 650 SUPPOSITORY RECTAL at 06:24

## 2022-11-20 RX ADMIN — POTASSIUM CHLORIDE 20 MEQ: 14.9 INJECTION, SOLUTION INTRAVENOUS at 08:15

## 2022-11-20 RX ADMIN — HEPARIN SODIUM 5000 UNITS: 5000 INJECTION INTRAVENOUS; SUBCUTANEOUS at 13:50

## 2022-11-20 RX ADMIN — POTASSIUM CHLORIDE 20 MEQ: 20 SOLUTION ORAL at 08:16

## 2022-11-20 NOTE — RESPIRATORY THERAPY NOTE
RT Ventilator Management Note  Jessica Ferreira 61 y o  male MRN: [id number]  Unit/Bed#: [facility] 727-78 Encounter: [id number]      Daily Screen         11/19/2022 0818 11/20/2022  0832          Patient safety screen outcome[de-identified] Failed Failed      Not Ready for Weaning due to[de-identified] PEEP > 8cmH2O Underline problem not resolved;PEEP > 8cmH2O                Physical Exam:   Assessment Type: Assess only  General Appearance: Sedated  Respiratory Pattern: Assisted  Chest Assessment: Chest expansion symmetrical  Bilateral Breath Sounds: Clear, Diminished  Suction: ET Tube  O2 Device: vent      Resp Comments: Remains on current documented vent settings  No SBT performed due to pt being on PEEP of 12  Bilateral BS clear/diminshed

## 2022-11-20 NOTE — NUTRITION
11/20/22 1350   Recommendations/Interventions   Recommendations to Provider agree with vital high protein at 20ml/hr + 3 packs prosource daily   eventual goal rate of vital high protein = 65ml/hr, which will provide 1560ml, 1560kcal (14kcal/kg admit), 136g protein (1 9g/kg IBW), 1304ml free H2O from TF formula

## 2022-11-20 NOTE — PROGRESS NOTES
Progress Note - Surgical Oncology  Karlee Moreau 61 y o  male MRN: [id number]  Unit/Bed#: [facility] 517-01 Encounter: [id number]    Assessment:  60yo M w/ metastatic colon cancer who initially underwent an exploratory laparotomy, transverse colon resection, reversal of loop colostomy, peritoneal biopsy, and segment 3 hepatic resection on 11/7/22 w/ a post-operative course complicated by hypoxic respiratory failure, recurrent encephalopathy, and an anastomotic leak requiring an exploratory laparotomy, right hemicolectomy, and temporary abdominal closure on 11/16/22 w/ RTOR on 11/17/22 for restoration of intestinal continuity, formation of loop ileostomy, and abdominal closure      T max 101 1 @ 6:20 AM  No tachy  BP stable off pressors  TF @ 10  UOP 2 3 L from 880  Vent PCMV, RR 14, PEEP 12, FiO2 40%  Precedex   7  Fent @ 50  Fluid @ 100      Plan:  - trend hemoglobin, for any evidence of continued blood loss, would recommend repeat CT w/ IV but given ALEXY would hold off for now)  - Wean ventilator per ICU team, appreciate assistance  - continue trickle TF  - Continue abdominal VICTORINO drains to bulb suction  - Continue midline abdominal wound vac  - Continue NG tube  - Continue routine ileostomy stoma care  - Continue Ertapenem per infectious disease for resistant E  Coli bacteremia      Subjective/Objective     Subjective:   No acute events overnight  See above for updates  Objective:     Blood pressure 109/56, pulse 82, temperature (!) 100 6 °F (38 1 °C), resp  rate 16, height 5' 8" (1 727 m), weight 118 kg (259 lb 7 7 oz), SpO2 98 %  ,Body mass index is 39 45 kg/m²        Intake/Output Summary (Last 24 hours) at 11/19/2022 2008  Last data filed at 11/19/2022 1800  Gross per 24 hour   Intake 3531 11 ml   Output 2020 ml   Net 1511 11 ml       Invasive Devices     Central Venous Catheter Line  Duration           Port A Cath 03/10/22 Right Chest 254 days          Peripheral Intravenous Line  Duration           Peripheral IV 11/16/22 Left Antecubital 3 days    Peripheral IV 11/18/22 Left;Ventral (anterior) Forearm <1 day    Peripheral IV 11/18/22 Right Antecubital <1 day          Drain  Duration           Urethral Catheter 16 Fr  4 days    Closed/Suction Drain Lateral;Right Abdomen 3 days    Closed/Suction Drain Left Abdomen Bulb 19 Fr  3 days    Ileostomy LUQ 2 days    NG/OG/Enteral Tube Nasogastric 2 days          Airway  Duration           ETT  Cuffed; Hi-Lo 8 mm 3 days                Physical Exam:   Gen: NAD, Comfortable  Neuro: A&O, No focal deficits  Head: Normal Cephalic, Atraumatic  Eye: EOMI, PERRLA, No scleral icterus  Neck: Supple, No JVD, Midline trachea  CV: RRR, Cap refill <2 sec  Pulm: Normal work of breathing, no respiratory distress  Abd: Soft, Distended, Non-Tender  Ext: No edema, Non-tender  Skin: warm, dry, intact

## 2022-11-20 NOTE — RESPIRATORY THERAPY NOTE
11/20/22 1248   Respiratory Assessment   Assessment Type Assess only   General Appearance Drowsy   Respiratory Pattern Assisted   Chest Assessment Chest expansion symmetrical   Bilateral Breath Sounds Diminished   Resp Comments Pt began to trigger apnew ventilation;placed back on PC settings  O2 Device vent   Vent Information   Vent type Tenorio G5   Tenorio C3/G5 Vent Mode P-CMV/PCV+   $ Pulse Oximetry Spot Check Charge Completed   SpO2 97 %   P-CMV/PCV+ Settings   Resp Rate (BPM) 14 BPM   Pressure Control/Pinsp (cmH20) 12 cmH20   Insp Time (S) 0 8 sec   FiO2 (%) 35 %   PEEP (cmH2O) 12 cmH2O   I:E Ratio 1:4 4    P-ramp (ms) 50 (ms)   Flow Trigger (LPM) 5 LPM   Humidification Heater   Heater Temp 98 6 °F (37 °C)   P-CMV/PCV+ Actuals   Resp Rate (BPM) 14 BPM   VT (mL) 416 mL   MV 6 4   MAP (cmH2O) 15 cmH2O   Peak Pressure (cmH2O) 25 cmH2O   I:E Ratio (Obs) 1:4 4   Heater Temperature (Obs) 98 6 °F (37 °C)   P-CMV/PCV+ Alarms    High Peak Pressure (cmH2O) 40 cmH2O   Low Pressure (cmH2O) 5 cm H2O   High Resp Rate (BPM) 40 BPM   Low Resp Rate (BPM) 8 BPM   High MV (L/min) 20 L/min   Low MV (L/min) 4 L/min   High Spont VTE (mL) 1000 mL   Low Spont VTE (mL) 350 mL   Maintenance   Alarm (pink) cable attached No   Resuscitation bag with peep valve at bedside Yes   Water bag changed No   Circuit changed No   IHI Ventilator Associated Pneumonia Bundle   Head of Bed Elevated HOB 30   ETT  Cuffed; Hi-Lo 8 mm   Placement Date/Time: 11/16/22 1240   Mask Ventilation: Mask ventilation not attempted (0)  Preoxygenated: Yes  Technique: Direct laryngoscopy;Rapid sequence;Stylet  Type: Cuffed; Hi-Lo  Tube Size: 8 mm  Laryngoscope: Mac  Blade Size: 3  Location: Oral      Secured at (cm) 24   Measured from Lips   Secured Location Right   Secured by Commercial tube leslie   Site Condition Dry   HI-LO Suction  Intermittent suction   HI-LO Secretions Scant   HI-LO Intervention Patent

## 2022-11-20 NOTE — RESPIRATORY THERAPY NOTE
11/19/22 1946   Respiratory Assessment   General Appearance Sedated   Respiratory Pattern Assisted   Chest Assessment Chest expansion symmetrical   Bilateral Breath Sounds Clear;Diminished   Resp Comments Received pt on current vent settings  Appears comforttable on these settings  No new changes at this time  Will continue to monitor and assess per protocol   ETT  Cuffed; Hi-Lo 8 mm   Placement Date/Time: 11/16/22 1240   Mask Ventilation: Mask ventilation not attempted (0)  Preoxygenated: Yes  Technique: Direct laryngoscopy;Rapid sequence;Stylet  Type: Cuffed; Hi-Lo  Tube Size: 8 mm  Laryngoscope: Mac  Blade Size: 3  Location: Oral   Secured at (cm) 24   Measured from Lips   Secured Location Left   Secured by Commercial tube leslei   Cuff Pressure (cm H2O) 24 cm H2O   HI-LO Suction  Intermittent suction   RT Ventilator Management Note  Tamera Rizzo 61 y o  male MRN: [id number]  Unit/Bed#: [facility] 517-01 Encounter: [id number]      Daily Screen         11/18/2022  1138 11/19/2022  0818          Patient safety screen outcome[de-identified] Passed Failed      Not Ready for Weaning due to[de-identified] -- PEEP > 8cmH2O      Spont breathing trial % for 30 min: Yes --                Physical Exam:   Assessment Type: Assess only  General Appearance: (P) Sedated  Respiratory Pattern: Assisted  Chest Assessment: Chest expansion symmetrical  Bilateral Breath Sounds: Clear, Diminished  Suction: ET Tube  O2 Device: vent      Resp Comments: (P) Received pt on current vent settings  Appears comforttable on these settings  No new changes at this time   Will continue to monitor and assess per protocol

## 2022-11-20 NOTE — RESPIRATORY THERAPY NOTE
11/20/22 0343   Respiratory Assessment   Respiratory Pattern Assisted   Chest Assessment Chest expansion symmetrical   Bilateral Breath Sounds Clear;Diminished   Resp Comments Remains on current vent settings  No new changes  Will continue to monitor and assess   ETT  Cuffed; Hi-Lo 8 mm   Placement Date/Time: 11/16/22 1240   Mask Ventilation: Mask ventilation not attempted (0)  Preoxygenated: Yes  Technique: Direct laryngoscopy;Rapid sequence;Stylet  Type: Cuffed; Hi-Lo  Tube Size: 8 mm  Laryngoscope: Mac  Blade Size: 3  Location: Oral   Secured at (cm) 24   Measured from Lips   Secured Location Left   Secured by Commercial tube leslie   HI-LO Suction  Intermittent suction   RT Ventilator Management Note  Para Hector 61 y o  male MRN: [id number]  Unit/Bed#: [facility] 499-23 Encounter: [id number]      Daily Screen         11/18/2022  1138 11/19/2022  0818          Patient safety screen outcome[de-identified] Passed Failed      Not Ready for Weaning due to[de-identified] -- PEEP > 8cmH2O      Spont breathing trial % for 30 min: Yes --                Physical Exam:   Assessment Type: Assess only  General Appearance: Sedated  Respiratory Pattern: Assisted  Chest Assessment: Chest expansion symmetrical  Bilateral Breath Sounds: Clear, Diminished      Resp Comments: (P) Remains on current vent settings  No new changes   Will continue to monitor and assess

## 2022-11-20 NOTE — PROCEDURES
Surgical Oncology  Bedside V A C  Procedure Note    A timeout was performed with the patient's nurse, prior to beginning the dressing change  Location of wound: midline    Dressings and Foam removed:  1 tape Dressings  2 Black Foam  0 White Foam    Dimensions of wound: 14 cm x 2 cm x 1 cm    Description of wound: On inspection of the wound today, there was no necrotic or nonviable tissue requiring debridement  The periwound skin remains clean and intact and unremarkable  VAC dressing application:  The periwound skin was cleaned and dried  1 tape dressings, 2 black foam, 0 white foam were cut to size of the wound and placed into the wound  The dressings were then covered with VAC drape  Additional VAC drape and black foam was used to create a bridge to the patient's right lower quadrant and a base for the track pad  The track pad was then placed over the base of black foam  The VAC was then set to 125 mmHg low Continuous suction  The patient tolerated the procedure well and there were no complications  The patient did not require any excisional debridement during today's dressing change  VAC settings:  125 mmHg  Continuous    Wound Images: Additional Notes: The [facility] sticker placed over the dressing per protocol  The next [facility] dressing change will be planned for Monday  This dressing change took greater than 15 minutes to complete      Rafael Fernández MD  11/19/2022 10:30 PM

## 2022-11-20 NOTE — PROGRESS NOTES
Daily Progress Note - Critical Care   Karlee Moreau 61 y o  male MRN: [id number]  Unit/Bed#: [facility] 517-01 Encounter: [id number]        ----------------------------------------------------------------------------------------  24hr events: Febrile overnight with a tmax of 101 1 given rectal tylenol  Patient initiated on trickle TF yesterday  Hemoglobin has remained stable     ---------------------------------------------------------------------------------------  SUBJECTIVE  Unable to assess secondary to ETT  Review of Systems   Unable to perform ROS: Intubated     Review of systems was unable to be performed secondary to ETT  ---------------------------------------------------------------------------------------  Assessment and Plan:    Neuro:   • Diagnosis: Acute post operative pain/sedation  o Plan:   - Fentanyl, precedex  - Consider adjuncts post extubation as poor pain control has led to poor inspiratory effort in past  • Diagnosis: Acute metabolic encephalopathy, resolved  o Plan:   - Frequent neuro checks      CV:   • Diagnosis: Hypotension, resolved  o Plan:   - Off pressors  - Goal MAP >65  • Diagnosis: Paroxysmal atrial tachycardia, resolved  o Plan:   - Continuous cardiac monitoring  • Diagnosis: History of HTN  o Plan:   - Holding home amlodipine and lisinopril  • Diagnosis: History of HLD  o Plan:   - Restart atorvastatin today      Pulm:  • Diagnosis: Acute hypoxic ventilatory dependent respiratory failure  o Plan:   - Improved, currently on pressure control  - Plan to perform SBT and potentially extubate today, will likely extubate to BiPAP given PEEP requirement  • Diagnosis: Pleural effusion, improved  o Plan:   - Improved on CXR from 11/19  - Continue to monitor respiratory status      GI:   • Diagnosis: Metastatic colon cancer  ? S/p ex lap, transverse colon resection, reversal of loop colostomy, peritoneal biopsy and segment 3 liver resection on 11/7  ?  Anastomotic leak s/p ex lap, R hemicolectomy 11/16  ? S/p diverting loop ileostomy 11/17  o Plan:   - Monitor drain/VAC output  - Serial abdominal exams  - Surgery following, appreciate recs      :   • Diagnosis: ALEXY on stage 3 CKD  o Plan:   - Improving, pending labs this morning  - Consider diuresis today  - Strict I&Os      F/E/N:   • Plan:  o F- isolyte @ 100/hr, consider stopping today with TF started  o E- monitor and replete as needed, goal Mag >2, Phos >3, K >4  o N- TF at [facility], consider advancing      Heme/Onc:   • Diagnosis: Acute blood loss anemia  o Plan:   - Stable  - Monitor for s/s bleeding, non currently  • Diagnosis: DVT ppx  o Plan:   - SQH      Endo:   • Diagnosis: T2DM with hyperglycemia  o Plan:   - SSI algorithm 4  - Lantus 15 U at bedtime  - Goal -180      ID:   • Diagnosis: Severe sepsis, ESBL bacteremia  o Plan:   - Repeat bcx 11/17 with no growth at 48 hours  - On ertapenem, will continue  - Febrile overnight, will monitor, WBC downtrending  - ID following appreciate ongoing recs      MSK/Skin:   • Diagnosis: Surgical sites  o Plan:   - Surgical site care as per surgery, wound vac changed yesterday  - PT/OT when able  - Frequent turns/reposition    Patient appropriate for transfer out of the ICU today?: No  Disposition: Continue Critical Care   Code Status: Level 1 - Full Code  ---------------------------------------------------------------------------------------  ICU CORE MEASURES    Prophylaxis   VTE Pharmacologic Prophylaxis: Heparin  VTE Mechanical Prophylaxis: sequential compression device  Stress Ulcer Prophylaxis: Pantoprazole IV     ABCDE Protocol (if indicated)  Plan to perform spontaneous awakening trial today? Yes  Plan to perform spontaneous breathing trial today? Yes  Obvious barriers to extubation?  No    Invasive Devices Review  Invasive Devices     Central Venous Catheter Line  Duration           Port A Cath 03/10/22 Right Chest 254 days          Peripheral Intravenous Line  Duration Peripheral IV 22 Left Antecubital 3 days    Peripheral IV 22 Left;Ventral (anterior) Forearm 1 day    Peripheral IV 22 Right Antecubital 1 day          Drain  Duration           Urethral Catheter 16 Fr  4 days    Closed/Suction Drain Lateral;Right Abdomen 3 days    Closed/Suction Drain Left Abdomen Bulb 19 Fr  3 days    NG/OG/Enteral Tube Nasogastric 3 days    Ileostomy LUQ 2 days          Airway  Duration           ETT  Cuffed; Hi-Lo 8 mm 3 days              Can any invasive devices be discontinued today? Yes  ---------------------------------------------------------------------------------------  OBJECTIVE    Vitals   Vitals:    22 0300 22 0400 22 0500 22 0600   BP: 119/65 118/66 116/64 116/65   Pulse: 72 72 72 77   Resp: 15 15 16 (!) 24   Temp: 99 9 °F (37 7 °C) 100 2 °F (37 9 °C) (!) 100 6 °F (38 1 °C) (!) 100 8 °F (38 2 °C)   TempSrc:       SpO2: 94% 96% 97% 92%   Weight:       Height:         Temp (24hrs), Av 8 °F (37 7 °C), Min:98 2 °F (36 8 °C), Max:100 8 °F (38 2 °C)  Current: Temperature: (!) 100 8 °F (38 2 °C)    Respiratory:  SpO2: SpO2: 92 %  Nasal Cannula O2 Flow Rate (L/min): 6 L/min    Invasive/non-invasive ventilation settings   Respiratory    Lab Data (Last 4 hours)    None         O2/Vent Data (Last 4 hours)    None                Physical Exam  Constitutional:       Appearance: He is obese  Interventions: He is sedated and intubated  HENT:      Head: Normocephalic and atraumatic  Right Ear: External ear normal       Left Ear: There is impacted cerumen  Nose: Nose normal       Mouth/Throat:      Mouth: Mucous membranes are moist    Eyes:      General: No scleral icterus  Right eye: No discharge  Left eye: No discharge  Extraocular Movements: Extraocular movements intact  Conjunctiva/sclera: Conjunctivae normal    Cardiovascular:      Rate and Rhythm: Normal rate and regular rhythm        Heart sounds: Normal heart sounds  No murmur heard  No friction rub  No gallop  Pulmonary:      Effort: Pulmonary effort is normal  He is intubated  Breath sounds: Normal breath sounds  Abdominal:      General: Abdomen is flat  Palpations: Abdomen is soft  Tenderness: There is abdominal tenderness  Comments: Wound vac clean, dry, intact  Drains intact with minimal sanguinous output  Ostomy site pink  Genitourinary:     Comments: Negro in place  Musculoskeletal:      Cervical back: Normal range of motion  Right lower leg: Edema present  Left lower leg: Edema present  Comments: Peripheral edema   Skin:     General: Skin is warm and dry  Neurological:      General: No focal deficit present  Laboratory and Diagnostics:  Results from last 7 days   Lab Units 11/19/22  1337 11/19/22  0500 11/19/22  0041 11/18/22  2241 11/18/22  0447 11/17/22  1603 11/17/22  0423 11/16/22  2328 11/16/22  1546 11/16/22  1343 11/16/22  0430   WBC Thousand/uL 14 21* 17 72*  --  17 07* 27 12* 22 72* 22 89* 29 65* 25 84*  --  21 07*   HEMOGLOBIN g/dL 7 0* 7 1* 6 3* 6 4* 8 1* 8 8* 8 4* 10 1* 10 7*  --  7 3*   I STAT HEMOGLOBIN   --   --   --   --   --   --   --   --   --    < >  --    HEMATOCRIT % 20 9* 22 0*  --  19 7* 24 9* 27 2* 25 4* 31 0* 33 9*  --  22 5*   HEMATOCRIT, ISTAT   --   --   --   --   --   --   --   --   --    < >  --    PLATELETS Thousands/uL 402* 386  --  384 410* 457* 346 452* 454*  --  340   NEUTROS PCT % 88* 87*  --   --  87*  --  85* 88*  --   --  83*   BANDS PCT %  --   --   --   --   --   --   --   --  9*  --   --    MONOS PCT % 7 8  --   --  8  --  8 6  --   --  6   MONO PCT %  --   --   --   --   --   --   --   --  0*  --   --     < > = values in this interval not displayed       Results from last 7 days   Lab Units 11/19/22  0500 11/19/22  0041 11/18/22  2122 11/18/22  0447 11/17/22  1603 11/17/22  0423 11/16/22  2328 11/16/22  1546 11/15/22  0517 11/14/22  0545   SODIUM mmol/L 143 142 142 141 139 140 141 143   < > 143   POTASSIUM mmol/L 3 8 4 2 4 2 4 8 4 9 4 8 5 0 4 2   < > 3 6   CHLORIDE mmol/L 110* 110* 111* 109* 110* 111* 112* 113*   < > 111*   CO2 mmol/L 22 23 21 21 23 23 22 22   < > 25   CO2, I-STAT   --   --   --   --   --   --   --   --    < >  --    ANION GAP mmol/L 11 9 10 11 6 6 7 8   < > 7   BUN mg/dL 56* 71* 70* 63* 52* 44* 38* 31*   < > 35*   CREATININE mg/dL 2 93* 3 14* 3 19* 2 91* 2 63* 2 17* 1 88* 1 33*   < > 1 26   CALCIUM mg/dL 8 2* 8 4 8 1* 7 9* 7 8* 7 7* 7 6* 7 5*   < > 8 2*   GLUCOSE RANDOM mg/dL 159* 166* 151* 175* 199* 220* 195* 177*   < > 192*   ALT U/L 12  --   --  13  --  18  --  23  --  46   AST U/L 58*  --   --  53*  --  38  --  40  --  49*   ALK PHOS U/L 141*  --   --  163*  --  158*  --  220*  --  228*   ALBUMIN g/dL 1 9*  --   --  1 6*  --  2 0*  --  1 4*  --  1 7*   TOTAL BILIRUBIN mg/dL 3 33*  --   --  2 85*  --  2 20*  --  1 53*  --  1 18*    < > = values in this interval not displayed  Results from last 7 days   Lab Units 11/19/22  0500 11/18/22 2122 11/18/22  0447 11/17/22  0423 11/16/22  2328 11/16/22  1546 11/15/22  0517   MAGNESIUM mg/dL 2 9* 2 9* 2 6 2 3 2 2 2 2 2 4   PHOSPHORUS mg/dL 8 3* 8 1* 8 7* 7 3* 6 3* 5 0* 3 9               Results from last 7 days   Lab Units 11/18/22 2122 11/17/22  1603 11/16/22  2328 11/16/22  1546 11/16/22  0430   LACTIC ACID mmol/L 1 3 1 5 1 6 1 4 1 6     ABG:  Results from last 7 days   Lab Units 11/18/22 2102   PH ART  7 424   PCO2 ART mm Hg 34 6*   PO2 ART mm Hg 133 7*   HCO3 ART mmol/L 22 1   BASE EXC ART mmol/L -2 0   ABG SOURCE  Radial, Left     VBG:  Results from last 7 days   Lab Units 11/18/22 2102 11/16/22  1547 11/16/22  0430   PH RUMA   --   --  7 420*   PCO2 RUMA mm Hg  --   --  40 1*   PO2 RUMA mm Hg  --   --  39 0   HCO3 RMUA mmol/L  --   --  25 4   BASE EXC RUMA mmol/L  --   --  0 9   ABG SOURCE  Radial, Left   < >  --     < > = values in this interval not displayed             Micro  Results from last 7 days   Lab Units 11/17/22  0016 11/16/22  1315 11/16/22  1314   BLOOD CULTURE  No Growth at 48 hrs  No Growth at 48 hrs   --   --    GRAM STAIN RESULT   --  No Polys or Bacteria seen No Polys or Bacteria seen       Imaging:  I have personally reviewed pertinent reports  Intake and Output  I/O       11/18 0701 11/19 0700 11/19 0701  11/20 0700    P  O  0 0    I V  (mL/kg) 2384 (20 2) 2836 9 (24)    Blood 350     NG/GT 0 0    IV Piggyback 1100 250    Feedings  123    Total Intake(mL/kg) 3834 (32 5) 3209 9 (27 2)    Urine (mL/kg/hr) 880 (0 3) 2155 (0 8)    Emesis/NG output 450 0    Drains 120 80    Stool 50 0    Total Output 1500 2235    Net +2334 +974 9          Unmeasured Stool Occurrence 1 x           Height and Weights   Height: 5' 8" (172 7 cm)  IBW (Ideal Body Weight): 68 4 kg  Body mass index is 39 45 kg/m²  Weight (last 2 days)     None            Nutrition       Diet Orders   (From admission, onward)             Start     Ordered    11/19/22 1421  Diet Enteral/Parenteral; Tube Feeding No Oral Diet; Jevity 1 2 Germán; Continuous; 10  Diet effective now        Comments: Trickle, do not advance   References:    Nutrtion Support Algorithm Enteral vs  Parenteral   Question Answer Comment   Diet Type Enteral/Parenteral    Enteral/Parenteral Tube Feeding No Oral Diet    Tube Feeding Formula: Jevity 1 2 Germán    Bolus/Cyclic/Continuous Continuous    Tube Feeding Goal Rate (mL/hr): 10    RD to adjust diet per protocol?  No        11/19/22 1420                  Active Medications  Scheduled Meds:  Current Facility-Administered Medications   Medication Dose Route Frequency Provider Last Rate   • acetaminophen  650 mg Rectal Q4H PRN GAURANG Diaz     • chlorhexidine  15 mL Mouth/Throat Q12H [address] GAURANG Diaz     • dexmedetomidine  0 1-0 7 mcg/kg/hr Intravenous Titrated Luisito Barreto MD 0 7 mcg/kg/hr (11/20/22 1979)   • ertapenem  1,000 mg Intravenous Q24H GAURANG Diaz Stopped (11/19/22 2000)   • fentaNYL  50 mcg/hr Intravenous Continuous Alicia Faye, DO 50 mcg/hr (11/19/22 1618)   • fentanyl citrate (PF)  50 mcg Intravenous Q1H PRN GAURANG Schuster     • heparin (porcine)  5,000 Units Subcutaneous [facility] GAURANG Schuster     • HYDROmorphone  0 5 mg Intravenous Q3H PRN Tarsha Foster MD     • insulin glargine  15 Units Subcutaneous HS GAURANG Schuster     • insulin lispro  2-12 Units Subcutaneous Q6H [address] GAURANG Schuster     • multi-electrolyte  100 mL/hr Intravenous Continuous Jannet Magallanes PA-C 100 mL/hr (11/20/22 0304)   • ondansetron  4 mg Intravenous Q4H PRN GAURANG Schuster     • pantoprazole  40 mg Intravenous Q24H [address] GAURANG Acharya       Continuous Infusions:  dexmedetomidine, 0 1-0 7 mcg/kg/hr, Last Rate: 0 7 mcg/kg/hr (11/20/22 0509)  fentaNYL, 50 mcg/hr, Last Rate: 50 mcg/hr (11/19/22 1618)  multi-electrolyte, 100 mL/hr, Last Rate: 100 mL/hr (11/20/22 0304)      PRN Meds:   acetaminophen, 650 mg, Q4H PRN  fentanyl citrate (PF), 50 mcg, Q1H PRN  HYDROmorphone, 0 5 mg, Q3H PRN  ondansetron, 4 mg, Q4H PRN        Allergies   Allergies   Allergen Reactions   • Shellfish-Derived Products - Food Allergy Anaphylaxis   • Erythromycin GI Intolerance     ---------------------------------------------------------------------------------------  Advance Directive and Living Will:      Power of :    POLST:    ---------------------------------------------------------------------------------------  Care Time Delivered:   No Critical Care time spent     Melodie, DO      Portions of the record may have been created with voice recognition software  Occasional wrong word or "sound a like" substitutions may have occurred due to the inherent limitations of voice recognition software    Read the chart carefully and recognize, using context, where substitutions have occurred

## 2022-11-20 NOTE — PLAN OF CARE
Problem: MOBILITY - ADULT  Goal: Maintain or return to baseline ADL function  Description: INTERVENTIONS:  -  Assess patient's ability to carry out ADLs; assess patient's baseline for ADL function and identify physical deficits which impact ability to perform ADLs (bathing, care of mouth/teeth, toileting, grooming, dressing, etc )  - Assess/evaluate cause of self-care deficits   - Assess range of motion  - Assess patient's mobility; develop plan if impaired  - Assess patient's need for assistive devices and provide as appropriate  - Encourage maximum independence but intervene and supervise when necessary  - Involve family in performance of ADLs  - Assess for home care needs following discharge   - Consider OT consult to assist with ADL evaluation and planning for discharge  - Provide patient education as appropriate  Outcome: Progressing     Problem: Prexisting or High Potential for Compromised Skin Integrity  Goal: Skin integrity is maintained or improved  Description: INTERVENTIONS:  - Identify patients at risk for skin breakdown  - Assess and monitor skin integrity  - Assess and monitor nutrition and hydration status  - Monitor labs   - Assess for incontinence   - Turn and reposition patient  - Assist with mobility/ambulation  - Relieve pressure over bony prominences  - Avoid friction and shearing  - Provide appropriate hygiene as needed including keeping skin clean and dry  - Evaluate need for skin moisturizer/barrier cream  - Collaborate with interdisciplinary team   - Patient/family teaching  - Consider wound care consult   Outcome: Progressing     Problem: Potential for Falls  Goal: Patient will remain free of falls  Description: INTERVENTIONS:  - Educate patient/family on patient safety including physical limitations  - Instruct patient to call for assistance with activity   - Consult OT/PT to assist with strengthening/mobility   - Keep Call bell within reach  - Keep bed low and locked with side rails adjusted as appropriate  - Keep care items and personal belongings within reach  - Initiate and maintain comfort rounds  - Make Fall Risk Sign visible to staff  - Offer Toileting every 2 Hours, in advance of need  - Initiate/Maintain bed alarm  - Obtain necessary fall risk management equipment: bed alarm  - Apply yellow socks and bracelet for high fall risk patients  - Consider moving patient to room near nurses station  Outcome: Progressing     Problem: Nutrition/Hydration-ADULT  Goal: Nutrient/Hydration intake appropriate for improving, restoring or maintaining nutritional needs  Description: Monitor and assess patient's nutrition/hydration status for malnutrition  Collaborate with interdisciplinary team and initiate plan and interventions as ordered  Monitor patient's weight and dietary intake as ordered or per policy  Utilize nutrition screening tool and intervene as necessary  Determine patient's food preferences and provide high-protein, high-caloric foods as appropriate       INTERVENTIONS:  - Monitor oral intake, urinary output, labs, and treatment plans  - Assess nutrition and hydration status and recommend course of action  - Evaluate amount of meals eaten  - Assist patient with eating if necessary   - Allow adequate time for meals  - Recommend/ encourage appropriate diets, oral nutritional supplements, and vitamin/mineral supplements  - Order, calculate, and assess calorie counts as needed  - Recommend, monitor, and adjust tube feedings and TPN/PPN based on assessed needs  - Assess need for intravenous fluids  - Provide specific nutrition/hydration education as appropriate  - Include patient/family/caregiver in decisions related to nutrition  Outcome: Progressing     Problem: PAIN - ADULT  Goal: Verbalizes/displays adequate comfort level or baseline comfort level  Description: Interventions:  - Encourage patient to monitor pain and request assistance  - Assess pain using appropriate pain scale  - Administer analgesics based on type and severity of pain and evaluate response  - Implement non-pharmacological measures as appropriate and evaluate response  - Consider cultural and social influences on pain and pain management  - Notify physician/advanced practitioner if interventions unsuccessful or patient reports new pain  Outcome: Progressing     Problem: INFECTION - ADULT  Goal: Absence or prevention of progression during hospitalization  Description: INTERVENTIONS:  - Assess and monitor for signs and symptoms of infection  - Monitor lab/diagnostic results  - Monitor all insertion sites, i e  indwelling lines, tubes, and drains  - Monitor endotracheal if appropriate and nasal secretions for changes in amount and color  - [place] appropriate cooling/warming therapies per order  - Administer medications as ordered  - Instruct and encourage patient and family to use good hand hygiene technique  - Identify and instruct in appropriate isolation precautions for identified infection/condition  Outcome: Progressing  Goal: Absence of fever/infection during neutropenic period  Description: INTERVENTIONS:  - Monitor WBC    Outcome: Progressing     Problem: SAFETY ADULT  Goal: Maintain or return to baseline ADL function  Description: INTERVENTIONS:  -  Assess patient's ability to carry out ADLs; assess patient's baseline for ADL function and identify physical deficits which impact ability to perform ADLs (bathing, care of mouth/teeth, toileting, grooming, dressing, etc )  - Assess/evaluate cause of self-care deficits   - Assess range of motion  - Assess patient's mobility; develop plan if impaired  - Assess patient's need for assistive devices and provide as appropriate  - Encourage maximum independence but intervene and supervise when necessary  - Involve family in performance of ADLs  - Assess for home care needs following discharge   - Consider OT consult to assist with ADL evaluation and planning for discharge  - Provide patient education as appropriate  Outcome: Progressing  Goal: Maintains/Returns to pre admission functional level  Description: INTERVENTIONS:  - Perform BMAT or MOVE assessment daily    - Set and communicate daily mobility goal to care team and patient/family/caregiver  - Collaborate with rehabilitation services on mobility goals if consulted  - Perform Range of Motion 3 times a day  - Reposition patient every 2 hours  - Dangle patient 3 times a day  - Stand patient 3 times a day  - Ambulate patient 3 times a day  - Out of bed to chair 3 times a day   - Out of bed for meals 3 times a day  - Out of bed for toileting  - Record patient progress and toleration of activity level   Outcome: Progressing     Problem: DISCHARGE PLANNING  Goal: Discharge to home or other facility with appropriate resources  Description: INTERVENTIONS:  - Identify barriers to discharge w/patient and caregiver  - Arrange for needed discharge resources and transportation as appropriate  - Identify discharge learning needs (meds, wound care, etc )  - Arrange for interpretive services to assist at discharge as needed  - Refer to Case Management Department for coordinating discharge planning if the patient needs post-hospital services based on physician/advanced practitioner order or complex needs related to functional status, cognitive ability, or social support system  Outcome: Progressing     Problem: Knowledge Deficit  Goal: Patient/family/caregiver demonstrates understanding of disease process, treatment plan, medications, and discharge instructions  Description: Complete learning assessment and assess knowledge base    Interventions:  - Provide teaching at level of understanding  - Provide teaching via preferred learning methods  Outcome: Progressing     Problem: SAFETY,RESTRAINT: NV/NON-SELF DESTRUCTIVE BEHAVIOR  Goal: Remains free of harm/injury (restraint for non violent/non self-detsructive behavior)  Description: INTERVENTIONS:  - Instruct patient/family regarding restraint use   - Assess and monitor physiologic and psychological status   - Provide interventions and comfort measures to meet assessed patient needs   - Identify and implement measures to help patient regain control  - Assess readiness for release of restraint   Outcome: Progressing  Goal: Returns to optimal restraint-free functioning  Description: INTERVENTIONS:  - Assess the patient's behavior and symptoms that indicate continued need for restraint  - Identify and implement measures to help patient regain control  - Assess readiness for release of restraint   Outcome: Progressing

## 2022-11-20 NOTE — PROGRESS NOTES
Progress Note - Infectious Disease   Felton Marx 61 y o  male MRN: [id number]  Unit/Bed#: [facility] 517-01 Encounter: [id number]      Impression/Recommendations:  1  Sepsis, secondary to intra-abdominal infection and bacteremia   Patient is clinically much improved on antibiotic and status post I&D/bowel resection   Temperature up in early postop period, but down again  Odra 60 trending down, near normalized  Antibiotic plan as in below  Monitor temperature/WBC  Monitor hemodynamics      2  ESBL producing E coli bacteremia, likely secondary to gut translocation during recent colon surgery   Patient is clinically improved   Bacteremia cleared   Patient completed antibiotic course for this but given probable anastomotic leak, will continue antibiotic for that indication  No further antibiotic needed for this      3  Anastomotic leak up with intra-abdominal abscess/infected hematoma   Patient is status post exploratory laparotomy, with drainage of abscess and cecum resection   Operative culture growing ESBL producing E coli again, with lactobacillus   Patient had repeat ex lap, with healthy-appearing bowel  Continue IV ertapenem  Treat x7 days postop, through 11/24      4  Metastatic colon cancer, with liver metastases   Patient is status post transverse colon resection  Surgical oncology follow-up      5  ALEXY, superimposed on CKD   Creatinine worsen postop but improving again  Antibiotic at full dose for now  Monitor creatinine       6  Acute hypoxic respiratory failure, previously on ventilator   Patient was extubated but now intubated again postop   No clinical pneumonia  Monitor respiratory status      7  Encephalopathy, multifactorial   Mental status was improved but now sedated on ventilator  He is responsive and communicative on ventilator    Monitor mental status      8  Type 2 DM      Discussed with patient      Antibiotics:  Ertapenem # 12  POD # 4/3     Subjective:  Patient is remains on ventilator, in ICU  He is awake and alert and attempts to communicate  Some abdominal discomfort  Temperature stays down  He is tolerating antibiotic well   No nausea, vomiting or diarrhea      Objective:  Vitals:  Temp:  [99 5 °F (37 5 °C)-101 1 °F (38 4 °C)] 99 9 °F (37 7 °C)  HR:  [68-82] 69  Resp:  [13-24] 20  BP: (103-119)/(55-72) 116/72  SpO2:  [92 %-100 %] 97 %  Temp (24hrs), Av 2 °F (37 9 °C), Min:99 5 °F (37 5 °C), Max:101 1 °F (38 4 °C)  Current: Temperature: 99 9 °F (37 7 °C)    Physical Exam:     General: Awake, alert on ventilator, attempts to communicate, nontoxic, no distress  Neck:  Supple  No mass  No lymphadenopathy  Lungs: Expansion symmetric, mild basilar rhonchi, no rales, no wheezing  Heart:  Regular rate and rhythm, S1 and S2 normal, no murmur  Abdomen: Soft, mild distention, mild diffuse tenderness  Wound with VAC in place  No purulence  No erythema/warmth  Extremities: Improved leg edema  No erythema/warmth  No ulcer  Nontender to palpation  Skin:  No rash  Neuro: Moves all extremities  Invasive Devices     Central Venous Catheter Line  Duration           Port A Cath 03/10/22 Right Chest 254 days          Peripheral Intravenous Line  Duration           Peripheral IV 22 Left;Ventral (anterior) Forearm 1 day    Peripheral IV 22 Right Antecubital 1 day    Peripheral IV 22 Right;Ventral (anterior) Forearm <1 day          Drain  Duration           Urethral Catheter 16 Fr  4 days    Closed/Suction Drain Lateral;Right Abdomen 3 days    Closed/Suction Drain Left Abdomen Bulb 19 Fr  3 days    NG/OG/Enteral Tube Nasogastric 3 days    Ileostomy LUQ 2 days          Airway  Duration           ETT  Cuffed; Hi-Lo 8 mm 3 days                Labs studies:   I have personally reviewed pertinent labs    Results from last 7 days   Lab Units 22  0602 22  0500 22  0041 22  2122 22  0447 22  1603 22  0423 22  1546 22  1343 POTASSIUM mmol/L 3 7 3 8 4 2   < > 4 8   < > 4 8   < >  --    CHLORIDE mmol/L 115* 110* 110*   < > 109*   < > 111*   < >  --    CO2 mmol/L 24 22 23   < > 21   < > 23   < >  --    CO2, I-STAT mmol/L  --   --   --   --   --   --   --   --  27   BUN mg/dL 65* 56* 71*   < > 63*   < > 44*   < >  --    CREATININE mg/dL 2 34* 2 93* 3 14*   < > 2 91*   < > 2 17*   < >  --    EGFR ml/min/1 73sq m 29 22 20   < > 22   < > 32   < >  --    GLUCOSE, ISTAT mg/dl  --   --   --   --   --   --   --   --  151*   CALCIUM mg/dL 7 7* 8 2* 8 4   < > 7 9*   < > 7 7*   < >  --    AST U/L  --  58*  --   --  53*  --  38   < >  --    ALT U/L  --  12  --   --  13  --  18   < >  --    ALK PHOS U/L  --  141*  --   --  163*  --  158*   < >  --     < > = values in this interval not displayed  Results from last 7 days   Lab Units 11/20/22  0602 11/19/22  1337 11/19/22  0500   WBC Thousand/uL 11 32* 14 21* 17 72*   HEMOGLOBIN g/dL 7 7* 7 0* 7 1*   PLATELETS Thousands/uL 467* 402* 386     Results from last 7 days   Lab Units 11/17/22  0016 11/16/22  1315 11/16/22  1314   BLOOD CULTURE  No Growth at 72 hrs  No Growth at 72 hrs   --   --    GRAM STAIN RESULT   --  No Polys or Bacteria seen No Polys or Bacteria seen       Imaging Studies:   I have personally reviewed pertinent imaging study reports and images in PACS  EKG, Pathology, and Other Studies:   I have personally reviewed pertinent reports

## 2022-11-20 NOTE — RESPIRATORY THERAPY NOTE
11/20/22 1608   Respiratory Assessment   Resp Comments PLaced pt back on SPONT mode   Pt currently doing well   Vent Information   [place] C3/G5 Vent Mode SPONT   $ Pulse Oximetry Spot Check Charge Completed   SpO2 98 %   SPONT Settings   FIO2 (%) 35 %   PEEP (cmH2O) 12 cmH2O   Pressure Support (cmH2O) 12 cmH20   Flow Trigger (LPM) 5 LPM   P-ramp (ms) 50 ms   ETS  (%) 25 %   Humidification Heater   Heater Temp 98 6 °F (37 °C)   SPONT Actuals   Resp Rate (BPM) 15 BPM   VT (mL) 528 mL   MV (Obs) 7 9   MAP (cmH2O) 15 cmH2O   Peak Pressure (cmH2O) 25 cmH2O   I:E Ratio (Obs) 1:3 4   Heater Temperature (Obs) 98 6 °F (37 °C)   SPONT Alarms   High Peak Pressure (cmH20) 40 cmH2O   High Resp Rate (BPM) 40 BPM   High MV (L/min) 20 L/min   Low MV (L/min) 4 L/min   High Spont VTE (mL) 1000 mL   Low Spont VTE (mL) 350 mL   Apnea Time (S) 20 S   SPONT Apnea Settings   Resp Rate (BPM) 15 BPM   FIO2 (%) 100 %   %TI (%) 1 3 %   Apnea Time (S) 20 S   Maintenance   Alarm (pink) cable attached No   Resuscitation bag with peep valve at bedside Yes   Water bag changed Yes   Circuit changed No   IHI Ventilator Associated Pneumonia Bundle   Head of Bed Elevated HOB 30

## 2022-11-21 ENCOUNTER — APPOINTMENT (OUTPATIENT)
Dept: RADIOLOGY | Facility: HOSPITAL | Age: 57
End: 2022-11-21

## 2022-11-21 ENCOUNTER — APPOINTMENT (INPATIENT)
Dept: RADIOLOGY | Facility: HOSPITAL | Age: 57
End: 2022-11-21

## 2022-11-21 LAB
ANION GAP SERPL CALCULATED.3IONS-SCNC: 5 MMOL/L (ref 4–13)
BASOPHILS # BLD AUTO: 0.04 THOUSANDS/ÂΜL (ref 0–0.1)
BASOPHILS NFR BLD AUTO: 0 % (ref 0–1)
BUN SERPL-MCNC: 55 MG/DL (ref 5–25)
CALCIUM SERPL-MCNC: 8 MG/DL (ref 8.3–10.1)
CHLORIDE SERPL-SCNC: 118 MMOL/L (ref 96–108)
CO2 SERPL-SCNC: 24 MMOL/L (ref 21–32)
CREAT SERPL-MCNC: 1.83 MG/DL (ref 0.6–1.3)
EOSINOPHIL # BLD AUTO: 0.36 THOUSAND/ÂΜL (ref 0–0.61)
EOSINOPHIL NFR BLD AUTO: 4 % (ref 0–6)
ERYTHROCYTE [DISTWIDTH] IN BLOOD BY AUTOMATED COUNT: 16.1 % (ref 11.6–15.1)
GFR SERPL CREATININE-BSD FRML MDRD: 39 ML/MIN/1.73SQ M
GLUCOSE SERPL-MCNC: 167 MG/DL (ref 65–140)
GLUCOSE SERPL-MCNC: 167 MG/DL (ref 65–140)
GLUCOSE SERPL-MCNC: 174 MG/DL (ref 65–140)
GLUCOSE SERPL-MCNC: 184 MG/DL (ref 65–140)
GLUCOSE SERPL-MCNC: 196 MG/DL (ref 65–140)
GLUCOSE SERPL-MCNC: 197 MG/DL (ref 65–140)
HCT VFR BLD AUTO: 24.6 % (ref 36.5–49.3)
HGB BLD-MCNC: 7.6 G/DL (ref 12–17)
IMM GRANULOCYTES # BLD AUTO: 0.11 THOUSAND/UL (ref 0–0.2)
IMM GRANULOCYTES NFR BLD AUTO: 1 % (ref 0–2)
LYMPHOCYTES # BLD AUTO: 0.65 THOUSANDS/ÂΜL (ref 0.6–4.47)
LYMPHOCYTES NFR BLD AUTO: 7 % (ref 14–44)
MAGNESIUM SERPL-MCNC: 3 MG/DL (ref 1.6–2.6)
MCH RBC QN AUTO: 28.9 PG (ref 26.8–34.3)
MCHC RBC AUTO-ENTMCNC: 30.9 G/DL (ref 31.4–37.4)
MCV RBC AUTO: 94 FL (ref 82–98)
MONOCYTES # BLD AUTO: 1.13 THOUSAND/ÂΜL (ref 0.17–1.22)
MONOCYTES NFR BLD AUTO: 12 % (ref 4–12)
NEUTROPHILS # BLD AUTO: 7.46 THOUSANDS/ÂΜL (ref 1.85–7.62)
NEUTS SEG NFR BLD AUTO: 76 % (ref 43–75)
NRBC BLD AUTO-RTO: 0 /100 WBCS
PHOSPHATE SERPL-MCNC: 3.8 MG/DL (ref 2.7–4.5)
PLATELET # BLD AUTO: 498 THOUSANDS/UL (ref 149–390)
PMV BLD AUTO: 10.5 FL (ref 8.9–12.7)
POTASSIUM SERPL-SCNC: 3.6 MMOL/L (ref 3.5–5.3)
RBC # BLD AUTO: 2.63 MILLION/UL (ref 3.88–5.62)
SODIUM SERPL-SCNC: 147 MMOL/L (ref 135–147)
WBC # BLD AUTO: 9.75 THOUSAND/UL (ref 4.31–10.16)

## 2022-11-21 PROCEDURE — 2W03X6Z CHANGE PRESSURE DRESSING ON ABDOMINAL WALL: ICD-10-PCS | Performed by: STUDENT IN AN ORGANIZED HEALTH CARE EDUCATION/TRAINING PROGRAM

## 2022-11-21 RX ORDER — OXYCODONE HCL 5 MG/5 ML
5 SOLUTION, ORAL ORAL EVERY 6 HOURS
Status: DISCONTINUED | OUTPATIENT
Start: 2022-11-21 | End: 2022-11-23

## 2022-11-21 RX ORDER — ACETAMINOPHEN 325 MG/1
650 TABLET ORAL EVERY 4 HOURS PRN
Status: DISCONTINUED | OUTPATIENT
Start: 2022-11-21 | End: 2022-11-23

## 2022-11-21 RX ORDER — FUROSEMIDE 10 MG/ML
20 INJECTION INTRAMUSCULAR; INTRAVENOUS ONCE
Status: COMPLETED | OUTPATIENT
Start: 2022-11-21 | End: 2022-11-21

## 2022-11-21 RX ORDER — POTASSIUM CHLORIDE 14.9 MG/ML
20 INJECTION INTRAVENOUS
Status: COMPLETED | OUTPATIENT
Start: 2022-11-21 | End: 2022-11-21

## 2022-11-21 RX ORDER — HYDROMORPHONE HCL/PF 1 MG/ML
0.5 SYRINGE (ML) INJECTION ONCE
Status: COMPLETED | OUTPATIENT
Start: 2022-11-21 | End: 2022-11-21

## 2022-11-21 RX ADMIN — FENTANYL CITRATE 50 MCG: 50 INJECTION INTRAMUSCULAR; INTRAVENOUS at 03:44

## 2022-11-21 RX ADMIN — HYDROMORPHONE HYDROCHLORIDE 0.5 MG: 1 INJECTION, SOLUTION INTRAMUSCULAR; INTRAVENOUS; SUBCUTANEOUS at 08:14

## 2022-11-21 RX ADMIN — HYDROMORPHONE HYDROCHLORIDE 0.5 MG: 1 INJECTION, SOLUTION INTRAMUSCULAR; INTRAVENOUS; SUBCUTANEOUS at 23:07

## 2022-11-21 RX ADMIN — Medication 0.7 MCG/KG/HR: at 07:59

## 2022-11-21 RX ADMIN — FENTANYL CITRATE 50 MCG: 50 INJECTION INTRAMUSCULAR; INTRAVENOUS at 16:50

## 2022-11-21 RX ADMIN — INSULIN LISPRO 2 UNITS: 100 INJECTION, SOLUTION INTRAVENOUS; SUBCUTANEOUS at 23:39

## 2022-11-21 RX ADMIN — ATORVASTATIN CALCIUM 40 MG: 40 TABLET, FILM COATED ORAL at 18:20

## 2022-11-21 RX ADMIN — INSULIN LISPRO 2 UNITS: 100 INJECTION, SOLUTION INTRAVENOUS; SUBCUTANEOUS at 05:01

## 2022-11-21 RX ADMIN — INSULIN GLARGINE 15 UNITS: 100 INJECTION, SOLUTION SUBCUTANEOUS at 23:35

## 2022-11-21 RX ADMIN — ACETAMINOPHEN 650 MG: 325 TABLET ORAL at 12:42

## 2022-11-21 RX ADMIN — INSULIN LISPRO 2 UNITS: 100 INJECTION, SOLUTION INTRAVENOUS; SUBCUTANEOUS at 11:20

## 2022-11-21 RX ADMIN — HYDROMORPHONE HYDROCHLORIDE 0.5 MG: 1 INJECTION, SOLUTION INTRAMUSCULAR; INTRAVENOUS; SUBCUTANEOUS at 17:24

## 2022-11-21 RX ADMIN — POTASSIUM CHLORIDE 20 MEQ: 14.9 INJECTION, SOLUTION INTRAVENOUS at 08:59

## 2022-11-21 RX ADMIN — HEPARIN SODIUM 5000 UNITS: 5000 INJECTION INTRAVENOUS; SUBCUTANEOUS at 14:02

## 2022-11-21 RX ADMIN — OXYCODONE HYDROCHLORIDE 5 MG: 5 SOLUTION ORAL at 23:45

## 2022-11-21 RX ADMIN — INSULIN LISPRO 2 UNITS: 100 INJECTION, SOLUTION INTRAVENOUS; SUBCUTANEOUS at 18:09

## 2022-11-21 RX ADMIN — IOHEXOL 98 ML: 350 INJECTION, SOLUTION INTRAVENOUS at 21:34

## 2022-11-21 RX ADMIN — Medication 50 MCG/HR: at 11:10

## 2022-11-21 RX ADMIN — Medication 0.7 MCG/KG/HR: at 02:58

## 2022-11-21 RX ADMIN — ACETAMINOPHEN 650 MG: 325 TABLET ORAL at 02:59

## 2022-11-21 RX ADMIN — FENTANYL CITRATE 50 MCG: 50 INJECTION INTRAMUSCULAR; INTRAVENOUS at 21:00

## 2022-11-21 RX ADMIN — POTASSIUM CHLORIDE 20 MEQ: 14.9 INJECTION, SOLUTION INTRAVENOUS at 06:38

## 2022-11-21 RX ADMIN — Medication 0.5 MCG/KG/HR: at 12:58

## 2022-11-21 RX ADMIN — FUROSEMIDE 20 MG: 10 INJECTION, SOLUTION INTRAMUSCULAR; INTRAVENOUS at 12:59

## 2022-11-21 RX ADMIN — HYDROMORPHONE HYDROCHLORIDE 0.5 MG: 1 INJECTION, SOLUTION INTRAMUSCULAR; INTRAVENOUS; SUBCUTANEOUS at 10:18

## 2022-11-21 RX ADMIN — OXYCODONE HYDROCHLORIDE 5 MG: 5 SOLUTION ORAL at 18:20

## 2022-11-21 RX ADMIN — CHLORHEXIDINE GLUCONATE 15 ML: 1.2 SOLUTION ORAL at 08:14

## 2022-11-21 RX ADMIN — HEPARIN SODIUM 5000 UNITS: 5000 INJECTION INTRAVENOUS; SUBCUTANEOUS at 05:01

## 2022-11-21 RX ADMIN — INSULIN LISPRO 2 UNITS: 100 INJECTION, SOLUTION INTRAVENOUS; SUBCUTANEOUS at 00:24

## 2022-11-21 RX ADMIN — Medication 0.5 MCG/KG/HR: at 19:32

## 2022-11-21 RX ADMIN — PANTOPRAZOLE SODIUM 40 MG: 40 INJECTION, POWDER, FOR SOLUTION INTRAVENOUS at 08:14

## 2022-11-21 RX ADMIN — OXYCODONE HYDROCHLORIDE 5 MG: 5 SOLUTION ORAL at 12:59

## 2022-11-21 RX ADMIN — HEPARIN SODIUM 5000 UNITS: 5000 INJECTION INTRAVENOUS; SUBCUTANEOUS at 23:00

## 2022-11-21 RX ADMIN — CHLORHEXIDINE GLUCONATE 15 ML: 1.2 SOLUTION ORAL at 23:34

## 2022-11-21 RX ADMIN — ERTAPENEM SODIUM 1000 MG: 1 INJECTION, POWDER, LYOPHILIZED, FOR SOLUTION INTRAMUSCULAR; INTRAVENOUS at 18:20

## 2022-11-21 NOTE — RESPIRATORY THERAPY NOTE
RT Ventilator Management Note  Tawny Hua 61 y o  male MRN: [id number]  Unit/Bed#: [facility] 757-70 Encounter: [id number]      Daily Screen         11/19/2022 0818 11/20/2022  0832          Patient safety screen outcome[de-identified] Failed Failed      Not Ready for Weaning due to[de-identified] PEEP > 8cmH2O Underline problem not resolved;PEEP > 8cmH2O                Physical Exam:   Assessment Type: Assess only  General Appearance: Sedated  Respiratory Pattern: Assisted  Chest Assessment: Chest expansion symmetrical  Bilateral Breath Sounds: Diminished  Cough: (P) Non-productive, Weak  Suction: (P) ET Tube  O2 Device: (P) vent      Resp Comments: (P) Pt doing well on current vent setting, No other changes made at this time, will continue to monitor

## 2022-11-21 NOTE — PROCEDURES
Acute Care Surgery  Bedside V A C  Procedure Note    A timeout was performed prior to beginning the dressing change  Location of wound: Midline abdominal    Dressings and Foam removed:  1  Dressings  1 Black Foam  1 Bridge with black foam  0 White Foam    Dimensions of wound: 25 cm x 1 cm x 1 cm    Description of wound: On inspection of the wound today, the wound appeared healthy, there was areas of slough which were gently removed  The wound extends down to fascia  Approximately 75% of the wound base is now pink and healthy and there is granulation tissue  The periwound skin remains clean and intact and unremarkable  VAC dressing application:  The periwound skin was cleaned and dried  1  dressings, 1 black foam, 0 white foam were cut to size of the wound and placed into the wound  The dressings were then covered with VAC drape  Additional VAC drape and black foam was used to create a bridge to the patient's left abdomen and a base for the track pad  The track pad was then placed over the base of black foam  The VAC was then set to 125 mmHg low Continuous suction  The patient tolerated the procedure well and there were no complications  The patient did not require any excisional debridement during today's dressing change  VAC settings:  125 mmHg  Continuous    Wound Images: Additional Notes: The [facility] sticker placed over the dressing per protocol  The next [facility] dressing change will be planned for 11/23  This dressing change took greater than 15 minutes to complete      Omer Espinoza MD  11/21/2022 10:38 AM

## 2022-11-21 NOTE — RESPIRATORY THERAPY NOTE
RT Ventilator Management Note  Martín Alva 61 y o  male MRN: [id number]  Unit/Bed#: [facility] 628-60 Encounter: [id number]      Daily Screen         11/19/2022 0818 11/20/2022  0832          Patient safety screen outcome[de-identified] Failed Failed      Not Ready for Weaning due to[de-identified] PEEP > 8cmH2O Underline problem not resolved;PEEP > 8cmH2O                Physical Exam:   Assessment Type: Assess only  General Appearance: Sedated  Respiratory Pattern: (P) Assisted  Chest Assessment: (P) Chest expansion symmetrical  Bilateral Breath Sounds: (P) Diminished  Cough: (P) Non-productive, Weak  Suction: (P) ET Tube  O2 Device: (P) vent      Resp Comments: (P) Pt stable, remains on same vent settings, no other changes made at this time, will continue to monitor

## 2022-11-21 NOTE — PLAN OF CARE
Problem: MOBILITY - ADULT  Goal: Maintain or return to baseline ADL function  Description: INTERVENTIONS:  -  Assess patient's ability to carry out ADLs; assess patient's baseline for ADL function and identify physical deficits which impact ability to perform ADLs (bathing, care of mouth/teeth, toileting, grooming, dressing, etc )  - Assess/evaluate cause of self-care deficits   - Assess range of motion  - Assess patient's mobility; develop plan if impaired  - Assess patient's need for assistive devices and provide as appropriate  - Encourage maximum independence but intervene and supervise when necessary  - Involve family in performance of ADLs  - Assess for home care needs following discharge   - Consider OT consult to assist with ADL evaluation and planning for discharge  - Provide patient education as appropriate  Outcome: Progressing     Problem: Prexisting or High Potential for Compromised Skin Integrity  Goal: Skin integrity is maintained or improved  Description: INTERVENTIONS:  - Identify patients at risk for skin breakdown  - Assess and monitor skin integrity  - Assess and monitor nutrition and hydration status  - Monitor labs   - Assess for incontinence   - Turn and reposition patient  - Assist with mobility/ambulation  - Relieve pressure over bony prominences  - Avoid friction and shearing  - Provide appropriate hygiene as needed including keeping skin clean and dry  - Evaluate need for skin moisturizer/barrier cream  - Collaborate with interdisciplinary team   - Patient/family teaching  - Consider wound care consult   Outcome: Progressing     Problem: Potential for Falls  Goal: Patient will remain free of falls  Description: INTERVENTIONS:  - Educate patient/family on patient safety including physical limitations  - Instruct patient to call for assistance with activity   - Consult OT/PT to assist with strengthening/mobility   - Keep Call bell within reach  - Keep bed low and locked with side rails adjusted as appropriate  - Keep care items and personal belongings within reach  - Initiate and maintain comfort rounds  - Make Fall Risk Sign visible to staff  - Offer Toileting every 2 Hours, in advance of need  - Initiate/Maintain bed alarm  - Obtain necessary fall risk management equipment: bed alarm  - Apply yellow socks and bracelet for high fall risk patients  - Consider moving patient to room near nurses station  Outcome: Progressing     Problem: Nutrition/Hydration-ADULT  Goal: Nutrient/Hydration intake appropriate for improving, restoring or maintaining nutritional needs  Description: Monitor and assess patient's nutrition/hydration status for malnutrition  Collaborate with interdisciplinary team and initiate plan and interventions as ordered  Monitor patient's weight and dietary intake as ordered or per policy  Utilize nutrition screening tool and intervene as necessary  Determine patient's food preferences and provide high-protein, high-caloric foods as appropriate       INTERVENTIONS:  - Monitor oral intake, urinary output, labs, and treatment plans  - Assess nutrition and hydration status and recommend course of action  - Evaluate amount of meals eaten  - Assist patient with eating if necessary   - Allow adequate time for meals  - Recommend/ encourage appropriate diets, oral nutritional supplements, and vitamin/mineral supplements  - Order, calculate, and assess calorie counts as needed  - Recommend, monitor, and adjust tube feedings and TPN/PPN based on assessed needs  - Assess need for intravenous fluids  - Provide specific nutrition/hydration education as appropriate  - Include patient/family/caregiver in decisions related to nutrition  Outcome: Progressing     Problem: PAIN - ADULT  Goal: Verbalizes/displays adequate comfort level or baseline comfort level  Description: Interventions:  - Encourage patient to monitor pain and request assistance  - Assess pain using appropriate pain scale  - Administer analgesics based on type and severity of pain and evaluate response  - Implement non-pharmacological measures as appropriate and evaluate response  - Consider cultural and social influences on pain and pain management  - Notify physician/advanced practitioner if interventions unsuccessful or patient reports new pain  Outcome: Progressing     Problem: INFECTION - ADULT  Goal: Absence or prevention of progression during hospitalization  Description: INTERVENTIONS:  - Assess and monitor for signs and symptoms of infection  - Monitor lab/diagnostic results  - Monitor all insertion sites, i e  indwelling lines, tubes, and drains  - Monitor endotracheal if appropriate and nasal secretions for changes in amount and color  - [place] appropriate cooling/warming therapies per order  - Administer medications as ordered  - Instruct and encourage patient and family to use good hand hygiene technique  - Identify and instruct in appropriate isolation precautions for identified infection/condition  Outcome: Progressing  Goal: Absence of fever/infection during neutropenic period  Description: INTERVENTIONS:  - Monitor WBC    Outcome: Progressing     Problem: SAFETY ADULT  Goal: Maintain or return to baseline ADL function  Description: INTERVENTIONS:  -  Assess patient's ability to carry out ADLs; assess patient's baseline for ADL function and identify physical deficits which impact ability to perform ADLs (bathing, care of mouth/teeth, toileting, grooming, dressing, etc )  - Assess/evaluate cause of self-care deficits   - Assess range of motion  - Assess patient's mobility; develop plan if impaired  - Assess patient's need for assistive devices and provide as appropriate  - Encourage maximum independence but intervene and supervise when necessary  - Involve family in performance of ADLs  - Assess for home care needs following discharge   - Consider OT consult to assist with ADL evaluation and planning for discharge  - Provide patient education as appropriate  Outcome: Progressing  Goal: Maintains/Returns to pre admission functional level  Description: INTERVENTIONS:  - Perform BMAT or MOVE assessment daily    - Set and communicate daily mobility goal to care team and patient/family/caregiver  - Collaborate with rehabilitation services on mobility goals if consulted  - Perform Range of Motion 3 times a day  - Reposition patient every 2 hours    - Dangle patient 3 times a day  - Stand patient 3 times a day  - Ambulate patient 3 times a day  - Out of bed to chair 3 times a day   - Out of bed for meals 3 times a day  - Out of bed for toileting  - Record patient progress and toleration of activity level   Outcome: Progressing  Goal: Patient will remain free of falls  Description: INTERVENTIONS:  - Educate patient/family on patient safety including physical limitations  - Instruct patient to call for assistance with activity   - Consult OT/PT to assist with strengthening/mobility   - Keep Call bell within reach  - Keep bed low and locked with side rails adjusted as appropriate  - Keep care items and personal belongings within reach  - Initiate and maintain comfort rounds  - Make Fall Risk Sign visible to staff  - Offer Toileting every 2 Hours, in advance of need  - Initiate/Maintain bed alarm  - Obtain necessary fall risk management equipment: bed alarm  - Apply yellow socks and bracelet for high fall risk patients  - Consider moving patient to room near nurses station  Outcome: Progressing     Problem: DISCHARGE PLANNING  Goal: Discharge to home or other facility with appropriate resources  Description: INTERVENTIONS:  - Identify barriers to discharge w/patient and caregiver  - Arrange for needed discharge resources and transportation as appropriate  - Identify discharge learning needs (meds, wound care, etc )  - Arrange for interpretive services to assist at discharge as needed  - Refer to Case Management Department for coordinating discharge planning if the patient needs post-hospital services based on physician/advanced practitioner order or complex needs related to functional status, cognitive ability, or social support system  Outcome: Progressing     Problem: Knowledge Deficit  Goal: Patient/family/caregiver demonstrates understanding of disease process, treatment plan, medications, and discharge instructions  Description: Complete learning assessment and assess knowledge base    Interventions:  - Provide teaching at level of understanding  - Provide teaching via preferred learning methods  Outcome: Progressing     Problem: SAFETY,RESTRAINT: NV/NON-SELF DESTRUCTIVE BEHAVIOR  Goal: Remains free of harm/injury (restraint for non violent/non self-detsructive behavior)  Description: INTERVENTIONS:  - Instruct patient/family regarding restraint use   - Assess and monitor physiologic and psychological status   - Provide interventions and comfort measures to meet assessed patient needs   - Identify and implement measures to help patient regain control  - Assess readiness for release of restraint   Outcome: Progressing  Goal: Returns to optimal restraint-free functioning  Description: INTERVENTIONS:  - Assess the patient's behavior and symptoms that indicate continued need for restraint  - Identify and implement measures to help patient regain control  - Assess readiness for release of restraint   Outcome: Progressing

## 2022-11-21 NOTE — PROGRESS NOTES
Progress Note - Infectious Disease   Gabriela Shore 61 y o  male MRN: [id number]  Unit/Bed#: [facility] 517-01 Encounter: [id number]      Impression/Recommendations:  1  Sepsis, secondary to intra-abdominal infection and bacteremia   Patient is clinically much improved on antibiotic and status post I&D/bowel resection   Temperature up in early postop period, but down again  [facility] is now normal   Temperature up again overnight but is down again  Will monitor for now  Antibiotic plan as in below  Monitor temperature/WBC  Monitor hemodynamics      2  ESBL producing E coli bacteremia, likely secondary to gut translocation during recent colon surgery   Patient is clinically improved   Bacteremia cleared   Patient completed antibiotic course for this but given probable anastomotic leak, will continue antibiotic for that indication  No further antibiotic needed for this      3  Anastomotic leak up with intra-abdominal abscess/infected hematoma   Patient is status post exploratory laparotomy, with drainage of abscess and cecum resection   Operative culture growing ESBL producing E coli again, with lactobacillus   Patient had repeat ex lap, with healthy-appearing bowel  Continue IV ertapenem  Treat x7 days postop, through 11/24      4  Metastatic colon cancer, with liver metastases   Patient is status post transverse colon resection  Surgical oncology follow-up      5  LAEXY, superimposed on CKD   Creatinine worsen postop but improving again  Antibiotic at full dose for now  Monitor creatinine       6  Acute hypoxic respiratory failure, previously on ventilator   Patient was extubated but now intubated again postop   No clinical pneumonia  Monitor respiratory status      7  Encephalopathy, multifactorial   Mental status was improved but now sedated on ventilator   He is responsive and attempts to communicate  on ventilator    Monitor mental status      8  Type 2 DM      Antibiotics:  Ertapenem # 13  POD # 5/4     Subjective:  Patient remains on ventilator, in ICU  He is sedated but arousable  Some abdominal discomfort  Temperature up overnight but now trending down  He is tolerating antibiotic well   No nausea, vomiting or diarrhea      Objective:  Vitals:  Temp:  [99 5 °F (37 5 °C)-101 3 °F (38 5 °C)] 99 9 °F (37 7 °C)  HR:  [63-87] 69  Resp:  [15-28] 18  BP: (104-128)/(58-72) 117/67  SpO2:  [92 %-99 %] 99 %  Temp (24hrs), Av 2 °F (37 9 °C), Min:99 5 °F (37 5 °C), Max:101 3 °F (38 5 °C)  Current: Temperature: 99 9 °F (37 7 °C)    Physical Exam:     General: Sedated on ventilator but arousable  Comfortable  Nontoxic  Neck:  Supple  No mass  No lymphadenopathy  Lungs: Expansion symmetric, sparse basilar rhonchi, no rales, no wheezing  Heart:  Regular rate and rhythm, S1 and S2 normal, no murmur  Abdomen: Soft, nondistended, wound with VAC, no purulence, no mass  Extremities: No edema  No erythema/warmth  No ulcer  Nontender to palpation  Skin:  No rash  Neuro: Not assessable  Invasive Devices     Central Venous Catheter Line  Duration           Port A Cath 03/10/22 Right Chest 255 days          Peripheral Intravenous Line  Duration           Peripheral IV 22 Left;Ventral (anterior) Forearm 2 days    Peripheral IV 22 Right Antecubital 2 days    Peripheral IV 22 Right;Ventral (anterior) Forearm 1 day          Drain  Duration           Urethral Catheter 16 Fr  5 days    Closed/Suction Drain Lateral;Right Abdomen 4 days    Closed/Suction Drain Left Abdomen Bulb 19 Fr  4 days    NG/OG/Enteral Tube Nasogastric 4 days    Ileostomy LUQ 3 days          Airway  Duration           ETT  Cuffed; Hi-Lo 8 mm 4 days                Labs studies:   I have personally reviewed pertinent labs    Results from last 7 days   Lab Units 22  0454 22  0602 22  0500 22  2122 22  0447 22  1603 22  0423 22  1546 22  1343   POTASSIUM mmol/L 3 6 3 7 3 8   < > 4 8   < > 4 8   < >  --    CHLORIDE mmol/L 118* 115* 110*   < > 109*   < > 111*   < >  --    CO2 mmol/L 24 24 22   < > 21   < > 23   < >  --    CO2, I-STAT mmol/L  --   --   --   --   --   --   --   --  27   BUN mg/dL 55* 65* 56*   < > 63*   < > 44*   < >  --    CREATININE mg/dL 1 83* 2 34* 2 93*   < > 2 91*   < > 2 17*   < >  --    EGFR ml/min/1 73sq m 39 29 22   < > 22   < > 32   < >  --    GLUCOSE, ISTAT mg/dl  --   --   --   --   --   --   --   --  151*   CALCIUM mg/dL 8 0* 7 7* 8 2*   < > 7 9*   < > 7 7*   < >  --    AST U/L  --   --  58*  --  53*  --  38   < >  --    ALT U/L  --   --  12  --  13  --  18   < >  --    ALK PHOS U/L  --   --  141*  --  163*  --  158*   < >  --     < > = values in this interval not displayed  Results from last 7 days   Lab Units 11/21/22  0454 11/20/22  0602 11/19/22  1337   WBC Thousand/uL 9 75 11 32* 14 21*   HEMOGLOBIN g/dL 7 6* 7 7* 7 0*   PLATELETS Thousands/uL 498* 467* 402*     Results from last 7 days   Lab Units 11/17/22  0016 11/16/22  1315 11/16/22  1314   BLOOD CULTURE  No Growth After 4 Days  No Growth After 4 Days  --   --    GRAM STAIN RESULT   --  No Polys or Bacteria seen No Polys or Bacteria seen       Imaging Studies:   I have personally reviewed pertinent imaging study reports and images in PACS  EKG, Pathology, and Other Studies:   I have personally reviewed pertinent reports

## 2022-11-21 NOTE — PROGRESS NOTES
Progress Note - Eloise Fraction 61 y o  male MRN: [id number]    Unit/Bed#: [facility] 931-14 Encounter: [id number]      Assessment:  62 y/o M w metastatic colon ca s/p transverse colectomy & partial hepatectomy 11/7/22 c/b anastomotic leak, ex lap, R hemicolectomy 11/16/22 and loop ileostomy 11/17  Tmax: 100 8 last 24 h  Intubated: 14/450/40%/10  Tolerated pressure support yesterday and peep weaned from 12-> 10  Ileostomy: functional 475 cc stool/ 24h  Uop: 100 cc /h  2 4L last 24 h    R VICTORINO: none  L VICTORINO 40 cc serosang  Plan:  Wean vent  Wean sedation  Continue tube feeds  Continue iv abx, ertapenem until 11/24, appreciate ID recs  Monitor fever curve  Maintain wound vac, changes MWF  Maintain VICTORINO drains  dvt ppx SQH    Subjective: Tolerated pressure support for a few hours yesterday  Continuous fentanyl turned off and pt more alert and following commands  Abdomen is tender to palpation and he winces when you examine him  Objective:     Vitals: Blood pressure 116/75, pulse 90, temperature (!) 100 6 °F (38 1 °C), resp  rate 17, height 5' 8" (1 727 m), weight 118 kg (259 lb 7 7 oz), SpO2 98 %  ,Body mass index is 39 45 kg/m²  Intake/Output Summary (Last 24 hours) at 11/22/2022 0515  Last data filed at 11/22/2022 0400  Gross per 24 hour   Intake 2214 47 ml   Output 2980 ml   Net -765 53 ml       Physical Exam  General: NAD  HEENT: NC/AT  MMM  Cv: RRR  Lungs: normal effort  Ab: Soft, tender to palaption, non distended  Ostomy pink functional  L VICTORINO w 40 cc serosang output  Ex: no CCE  Neuro: intubated  Follows commands         Invasive Devices     Central Venous Catheter Line  Duration           Port A Cath 03/10/22 Right Chest 256 days          Peripheral Intravenous Line  Duration           Peripheral IV 11/18/22 Left;Ventral (anterior) Forearm 3 days    Peripheral IV 11/20/22 Right;Ventral (anterior) Forearm 1 day    Peripheral IV 11/21/22 Left;Upper;Ventral (anterior) Arm <1 day          Drain Duration           Urethral Catheter 16 Fr  6 days    Closed/Suction Drain Lateral;Right Abdomen 5 days    Closed/Suction Drain Left Abdomen Bulb 19 Fr  5 days    NG/OG/Enteral Tube Nasogastric 5 days    Ileostomy LUQ 4 days          Airway  Duration           ETT  Cuffed; Hi-Lo 8 mm 5 days                Lab, Imaging and other studies: I have personally reviewed pertinent reports      VTE Pharmacologic Prophylaxis: Heparin  VTE Mechanical Prophylaxis: sequential compression device

## 2022-11-21 NOTE — PROGRESS NOTES
Daily Progress Note - Critical Care   Satish Qiu 61 y o  male MRN: [id number]  Unit/Bed#: [facility] 517-01 Encounter: [id number]        ----------------------------------------------------------------------------------------  HPI/24hr events: Febrile overnight to 38 4 C  Received oral Tylenol through NGT  Continuing tube feeds  Hgb stable     ---------------------------------------------------------------------------------------  SUBJECTIVE  Unable to assess 2/2 ETT    Review of Systems  Review of systems was unable to be performed secondary to ETT  ---------------------------------------------------------------------------------------  Assessment and Plan:    Neuro:   • Diagnosis: Acute post operative pain/sedation  ? Plan:   - Fentanyl, precedex  - Consider adjuncts post extubation as poor pain control has led to poor inspiratory effort in past  • Diagnosis: Acute metabolic encephalopathy, resolved  ? Plan:   - Frequent neuro checks        CV:   • Diagnosis: Hypotension, resolved  ? Plan:   - Off pressors  - Goal MAP >65  • Diagnosis: Paroxysmal atrial tachycardia, resolved  ? Plan:   - Continuous cardiac monitoring  • Diagnosis: History of HTN  ? Plan:   - Holding home amlodipine and lisinopril  • Diagnosis: History of HLD  ? Plan:   - Continue atorvastatin         Pulm:  • Diagnosis: Acute hypoxic ventilatory dependent respiratory failure  ? Plan:   - Did poorly on pressure support, moved back to ventilation  - Plan to perform SBT and potentially extubate today, will likely extubate to BiPAP given PEEP requirement  • Diagnosis: Pleural effusion, improved  ? Plan:   - Improved on CXR from 11/19  - Continue to monitor respiratory status        GI:   • Diagnosis: Metastatic colon cancer  ? S/p ex lap, transverse colon resection, reversal of loop colostomy, peritoneal biopsy and segment 3 liver resection on 11/7  ? Anastomotic leak s/p ex lap, R hemicolectomy 11/16  ? S/p diverting loop ileostomy 11/17  ?  Plan: - Monitor drain/VAC output  - Serial abdominal exams  - Surgery following, appreciate recs        :   • Diagnosis: ALEXY on stage 3 CKD  ? Plan:   - Improving, pending labs this morning  - Consider diuresis today goal negative 500mL  - Strict I&Os        F/E/N:   • Plan:  ? F- none  ? E- monitor and replete as needed, goal Mag >2, Phos >3, K >4  ? N- TF continuous        Heme/Onc:   • Diagnosis: Acute blood loss anemia  ? Plan:   - Stable  - Monitor for s/s bleeding, non currently  • Diagnosis: DVT ppx  ? Plan:   - SQH        Endo:   • Diagnosis: T2DM with hyperglycemia  ? Plan:   - SSI algorithm 4  - Lantus 15 U at bedtime  - Goal -180        ID:   • Diagnosis: Severe sepsis, ESBL bacteremia  ? Plan:   - Repeat bcx 11/17 with no growth at 48 hours  - On ertapenem, will continue  - Febrile overnight, will monitor, WBC downtrending  - ID following appreciate ongoing recs        MSK/Skin:   • Diagnosis: Surgical sites  ? Plan:   - Surgical site care as per surgery, wound vac changed yesterday  - PT/OT when able  - Frequent turns/reposition  Patient appropriate for transfer out of the ICU today?: No  Disposition: Continue Critical Care   Code Status: Level 1 - Full Code  ---------------------------------------------------------------------------------------  ICU CORE MEASURES    Prophylaxis   VTE Pharmacologic Prophylaxis: Heparin  VTE Mechanical Prophylaxis: sequential compression device  Stress Ulcer Prophylaxis: Pantoprazole IV     ABCDE Protocol (if indicated)  Plan to perform spontaneous awakening trial today? Yes  Plan to perform spontaneous breathing trial today? Yes  Obvious barriers to extubation?  No    Invasive Devices Review  Invasive Devices     Central Venous Catheter Line  Duration           Port A Cath 03/10/22 Right Chest 255 days          Peripheral Intravenous Line  Duration           Peripheral IV 11/18/22 Left;Ventral (anterior) Forearm 2 days    Peripheral IV 11/18/22 Right Antecubital 2 days    Peripheral IV 22 Right;Ventral (anterior) Forearm <1 day          Drain  Duration           Urethral Catheter 16 Fr  5 days    Closed/Suction Drain Lateral;Right Abdomen 4 days    Closed/Suction Drain Left Abdomen Bulb 19 Fr  4 days    NG/OG/Enteral Tube Nasogastric 4 days    Ileostomy LUQ 3 days          Airway  Duration           ETT  Cuffed; Hi-Lo 8 mm 4 days              Can any invasive devices be discontinued today?  No  ---------------------------------------------------------------------------------------  OBJECTIVE    Vitals   Vitals:    22 0011 22 0100 22 0129 22 0200   BP:  124/68  126/71   BP Location:       Pulse:  72  75   Resp:  22  22   Temp:  (!) 100 8 °F (38 2 °C)  (!) 101 1 °F (38 4 °C)   TempSrc:       SpO2: 97% 97% 97% 95%   Weight:       Height:         Temp (24hrs), Av 3 °F (37 9 °C), Min:99 5 °F (37 5 °C), Max:101 1 °F (38 4 °C)  Current: Temperature: (!) 101 1 °F (38 4 °C)    Respiratory:  SpO2: SpO2: 95 %  Nasal Cannula O2 Flow Rate (L/min): 6 L/min    Invasive/non-invasive ventilation settings   Respiratory    Lab Data (Last 4 hours)    None         O2/Vent Data (Last 4 hours)    None                Physical Exam        Laboratory and Diagnostics:  Results from last 7 days   Lab Units 22  0602 22  1337 22  0500 22  0041 22  2241 22  0447 22  1603 22  0423 22  2328 22  1546 22  1343 22  0430   WBC Thousand/uL 11 32* 14 21* 17 72*  --  17 07* 27 12* 22 72* 22 89* 29 65* 25 84*  --  21 07*   HEMOGLOBIN g/dL 7 7* 7 0* 7 1* 6 3* 6 4* 8 1* 8 8* 8 4* 10 1* 10 7*  --  7 3*   I STAT HEMOGLOBIN   --   --   --   --   --   --   --   --   --   --    < >  --    HEMATOCRIT % 24 0* 20 9* 22 0*  --  19 7* 24 9* 27 2* 25 4* 31 0* 33 9*  --  22 5*   HEMATOCRIT, ISTAT   --   --   --   --   --   --   --   --   --   --    < >  --    PLATELETS Thousands/uL 467* 402* 386  --  384 410* 457* 346 452* 454*  --  340   NEUTROS PCT % 85* 88* 87*  --   --  87*  --  85* 88*  --   --  83*   BANDS PCT %  --   --   --   --   --   --   --   --   --  9*  --   --    MONOS PCT % 9 7 8  --   --  8  --  8 6  --   --  6   MONO PCT %  --   --   --   --   --   --   --   --   --  0*  --   --     < > = values in this interval not displayed  Results from last 7 days   Lab Units 11/20/22  0602 11/19/22  0500 11/19/22  0041 11/18/22  2122 11/18/22  0447 11/17/22  1603 11/17/22  0423 11/16/22  2328 11/16/22  1546 11/15/22  0517 11/14/22  0545   SODIUM mmol/L 147 143 142 142 141 139 140   < > 143   < > 143   POTASSIUM mmol/L 3 7 3 8 4 2 4 2 4 8 4 9 4 8   < > 4 2   < > 3 6   CHLORIDE mmol/L 115* 110* 110* 111* 109* 110* 111*   < > 113*   < > 111*   CO2 mmol/L 24 22 23 21 21 23 23   < > 22   < > 25   CO2, I-STAT   --   --   --   --   --   --   --   --   --    < >  --    ANION GAP mmol/L 8 11 9 10 11 6 6   < > 8   < > 7   BUN mg/dL 65* 56* 71* 70* 63* 52* 44*   < > 31*   < > 35*   CREATININE mg/dL 2 34* 2 93* 3 14* 3 19* 2 91* 2 63* 2 17*   < > 1 33*   < > 1 26   CALCIUM mg/dL 7 7* 8 2* 8 4 8 1* 7 9* 7 8* 7 7*   < > 7 5*   < > 8 2*   GLUCOSE RANDOM mg/dL 153* 159* 166* 151* 175* 199* 220*   < > 177*   < > 192*   ALT U/L  --  12  --   --  13  --  18  --  23  --  46   AST U/L  --  58*  --   --  53*  --  38  --  40  --  49*   ALK PHOS U/L  --  141*  --   --  163*  --  158*  --  220*  --  228*   ALBUMIN g/dL  --  1 9*  --   --  1 6*  --  2 0*  --  1 4*  --  1 7*   TOTAL BILIRUBIN mg/dL  --  3 33*  --   --  2 85*  --  2 20*  --  1 53*  --  1 18*    < > = values in this interval not displayed       Results from last 7 days   Lab Units 11/20/22  0602 11/19/22  0500 11/18/22 2122 11/18/22  0447 11/17/22  0423 11/16/22  2328 11/16/22  1546   MAGNESIUM mg/dL 2 9* 2 9* 2 9* 2 6 2 3 2 2 2 2   PHOSPHORUS mg/dL 5 5* 8 3* 8 1* 8 7* 7 3* 6 3* 5 0*               Results from last 7 days   Lab Units 11/18/22 2122 11/17/22  1603 11/16/22 2328 11/16/22  1546 11/16/22  0430   LACTIC ACID mmol/L 1 3 1 5 1 6 1 4 1 6     ABG:  Results from last 7 days   Lab Units 11/18/22 2102   PH ART  7 424   PCO2 ART mm Hg 34 6*   PO2 ART mm Hg 133 7*   HCO3 ART mmol/L 22 1   BASE EXC ART mmol/L -2 0   ABG SOURCE  Radial, Left     VBG:  Results from last 7 days   Lab Units 11/18/22 2102 11/16/22  1547 11/16/22  0430   PH RUMA   --   --  7 420*   PCO2 RUMA mm Hg  --   --  40 1*   PO2 RUMA mm Hg  --   --  39 0   HCO3 RUMA mmol/L  --   --  25 4   BASE EXC RUMA mmol/L  --   --  0 9   ABG SOURCE  Radial, Left   < >  --     < > = values in this interval not displayed  Micro  Results from last 7 days   Lab Units 11/17/22  0016 11/16/22  1315 11/16/22  1314   BLOOD CULTURE  No Growth at 72 hrs  No Growth at 72 hrs   --   --    GRAM STAIN RESULT   --  No Polys or Bacteria seen No Polys or Bacteria seen       Imaging:  I have personally reviewed pertinent films in PACS    Intake and Output  I/O       11/19 0701  11/20 0700 11/20 0701  11/21 0700    P  O  0 0    I V  (mL/kg) 2888 3 (24 5) 826 4 (7)    NG/GT 0 125    IV Piggyback 250 150    Feedings 139 306    Total Intake(mL/kg) 3277 3 (27 8) 1407 4 (11 9)    Urine (mL/kg/hr) 2355 (0 8) 2300 (0 8)    Emesis/NG output 0 0    Drains 105 40    Stool 0 100    Total Output 2460 2440    Net +817 3 -1032 7                 Height and Weights   Height: 5' 8" (172 7 cm)  IBW (Ideal Body Weight): 68 4 kg  Body mass index is 39 45 kg/m²    Weight (last 2 days)     None            Nutrition       Diet Orders   (From admission, onward)             Start     Ordered    11/20/22 0947  Diet Enteral/Parenteral; Tube Feeding No Oral Diet; Vital High Protein; Continuous; 20; Prosource Protein Liquid - Three Packets Daily  Diet effective now        Comments: Trickle, do not advance   References:    Nutrtion Support Algorithm Enteral vs  Parenteral   Question Answer Comment   Diet Type Enteral/Parenteral    Enteral/Parenteral Tube Feeding No Oral Diet    Tube Feeding Formula: Vital High Protein    Bolus/Cyclic/Continuous Continuous    Tube Feeding Goal Rate (mL/hr): 20    Prosource Protein Liquid - No Carb Prosource Protein Liquid - Three Packets Daily    RD to adjust diet per protocol? No        11/20/22 0947              TF currently running at 20 ml/hr with a goal of 20 ml/hr   Formula: Vital High Protein      Active Medications  Scheduled Meds:  Current Facility-Administered Medications   Medication Dose Route Frequency Provider Last Rate   • acetaminophen  650 mg Rectal Q4H PRN GAURANG Ruiz     • atorvastatin  40 mg Oral After [address], DO     • chlorhexidine  15 mL Mouth/Throat Q12H [address] GAURANG Ruiz     • dexmedetomidine  0 1-0 7 mcg/kg/hr Intravenous Titrated Jenn Byrnes MD 0 7 mcg/kg/hr (11/20/22 2138)   • ertapenem  1,000 mg Intravenous Q24H Jenn Byrnes MD 1,000 mg (11/20/22 1822)   • fentaNYL  50 mcg/hr Intravenous Continuous Alicia Faye DO 50 mcg/hr (11/20/22 1241)   • fentanyl citrate (PF)  50 mcg Intravenous Q1H PRN GAURANG Ruiz     • heparin (porcine)  5,000 Units Subcutaneous [facility] GAURANG Ruiz     • HYDROmorphone  0 5 mg Intravenous Q3H PRN Anca Rodriguez MD     • insulin glargine  15 Units Subcutaneous HS GAURAGN Ruiz     • insulin lispro  2-12 Units Subcutaneous Q6H [address] GAURANG Acharya     • ondansetron  4 mg Intravenous Q4H PRN GAURANG Ruiz     • pantoprazole  40 mg Intravenous Q24H [address] GAURANG Acharya       Continuous Infusions:  dexmedetomidine, 0 1-0 7 mcg/kg/hr, Last Rate: 0 7 mcg/kg/hr (11/20/22 2138)  fentaNYL, 50 mcg/hr, Last Rate: 50 mcg/hr (11/20/22 1241)      PRN Meds:   acetaminophen, 650 mg, Q4H PRN  fentanyl citrate (PF), 50 mcg, Q1H PRN  HYDROmorphone, 0 5 mg, Q3H PRN  ondansetron, 4 mg, Q4H PRN        Allergies   Allergies   Allergen Reactions   • Shellfish-Derived Products - Food Allergy Anaphylaxis   • Erythromycin GI Intolerance     ---------------------------------------------------------------------------------------  Care Time Delivered:   Upon my evaluation, this patient had a high probability of imminent or life-threatening deterioration due to acute hypoxic respiratory failure, which required my direct attention, intervention, and personal management  I have personally provided 15 minutes (10min to 20min) of critical care time, exclusive of procedures, teaching, family meetings, and any prior time recorded by providers other than myself  Jose Girard MD      Portions of the record may have been created with voice recognition software  Occasional wrong word or "sound a like" substitutions may have occurred due to the inherent limitations of voice recognition software    Read the chart carefully and recognize, using context, where substitutions have occurred

## 2022-11-21 NOTE — PROGRESS NOTES
Progress Note - Surgical Oncology  Demetris Li 61 y o  male MRN: [id number]  Unit/Bed#: [facility] 517-01 Encounter: [id number]    Assessment:  60yo M w/ metastatic colon cancer who initially underwent an exploratory laparotomy, transverse colon resection, reversal of loop colostomy, peritoneal biopsy, and segment 3 hepatic resection on 11/7/22 w/ a post-operative course complicated by hypoxic respiratory failure, recurrent encephalopathy, and an anastomotic leak requiring an exploratory laparotomy, right hemicolectomy, and temporary abdominal closure on 11/16/22 w/ RTOR on 11/17/22 for restoration of intestinal continuity, formation of loop ileostomy, and abdominal closure      T max 101 3 @ 3 AM  No tachy  BP stable off pressors  TF @ 20  UOP 2 6 L   Vent SCMV, RR 14, , PEEP 12, FiO2 40%  Precedex   7  Fent @ 48  Ostomy with brown liquid stool small amount    R VICTORINO 0  L VICTORINO 60 dark ss  Vac 0      Plan:  - trend hemoglobin, for any evidence of continued blood loss, would recommend repeat CT w/ IV   - Wean ventilator per ICU team, appreciate assistance  - continue TF  - Continue abdominal VICTORINO drains to bulb suction  - Continue midline abdominal wound vac  - Continue NG tube  - Continue routine ileostomy stoma care  - Continue Ertapenem per infectious disease for resistant E  Coli bacteremia      Subjective/Objective     Subjective:   No acute events overnight  Does follow commands  Sleepy  Objective:     Blood pressure 104/58, pulse 87, temperature (!) 100 8 °F (38 2 °C), resp  rate 20, height 5' 8" (1 727 m), weight 118 kg (259 lb 7 7 oz), SpO2 96 %  ,Body mass index is 39 45 kg/m²        Intake/Output Summary (Last 24 hours) at 11/21/2022 0618  Last data filed at 11/21/2022 0400  Gross per 24 hour   Intake 1589 15 ml   Output 2835 ml   Net -1245 85 ml       Invasive Devices     Central Venous Catheter Line  Duration           Port A Cath 03/10/22 Right Chest 255 days          Peripheral Intravenous Line Duration           Peripheral IV 11/18/22 Left;Ventral (anterior) Forearm 2 days    Peripheral IV 11/18/22 Right Antecubital 2 days    Peripheral IV 11/20/22 Right;Ventral (anterior) Forearm 1 day          Drain  Duration           Urethral Catheter 16 Fr  5 days    Closed/Suction Drain Lateral;Right Abdomen 4 days    Closed/Suction Drain Left Abdomen Bulb 19 Fr  4 days    NG/OG/Enteral Tube Nasogastric 4 days    Ileostomy LUQ 3 days          Airway  Duration           ETT  Cuffed; Hi-Lo 8 mm 4 days                Physical Exam:   Gen: NAD, Comfortable  Neuro: A&O, No focal deficits  Head: Normal Cephalic, Atraumatic  Eye: EOMI, PERRLA, No scleral icterus  Neck: Supple, No JVD, Midline trachea  CV: RRR, Cap refill <2 sec  Pulm: Normal work of breathing, no respiratory distress  Abd: Soft, Non-Distended, Non-Tender, VICTORINO see above, vac with suction  Ext: No edema, Non-tender  Skin: warm, dry, intact

## 2022-11-21 NOTE — PROGRESS NOTES
Pastoral Care Progress Note    2022  Patient: Jessica Ferreira : 1965  Admission Date & Time: 2022 0515  MRN: [id number] CSN: [id number]         22 1200   Clinical Encounter Type   Visited With Patient; Health care provider;Patient not available   Routine Visit Introduction   Continue Visiting Yes   Crisis Visit Critical Care   Referral From [facility] Pang knocked and entered patients room while the patient was not alert and being cared for by his nurse  I had been referred to the patient via Korey Renteria  I asked the nurse when the patient's wife usually visits, and she said the wife usually comes around 4 pm  I told her I would [place] back around later this afternoon in an attempt to check-in and introduce myself  If the wife is still not present, I will pass this visit on to the on-call  for this evening  Chaplains still remain available             Chaplaincy Outcomes Achieved:  Unknown outcome

## 2022-11-21 NOTE — PROGRESS NOTES
Pastoral Care Progress Note    2022  Patient: Madison Andrea : 1965  Admission Date & Time: 2022 0515  MRN: [id number] [facility]: [id number]         entered while pt's wife Felicia Lance was trying to communicate with him  Pt non-verbally expressed frustration  Wife seemed to think he wanted to speak with Dr Denisha Jean but was unsure why  Pt also seemed uncomfortable and nurse came to reposition, giving  and wife time to connect  Assured wife Spiritual Care Dept was here for both her and pt/   The following notes are referring to wife:             Chaplaincy Interventions Utilized:   Empowerment: Encouraged focus on present and Encouraged self-care    Exploration: Explored relational needs & resources and Identified, evaluated & reinforced appropriate coping strategies    Wife expressed gratitude for her mother and sister who are good supports for her  Relationship Building: Cultivated a relationship of care and support, Listened empathically, and Provided silent and supportive presence    Chaplaincy Outcomes Achieved:  Expressed peace    Pt's wife Felicia Lance spoke of feeling peace despite these circumstances, understanding she is doing her best and things are not within her control  Therefore, she is here for each up and down of this journey, knowing that she does not walk alone  Spiritual Coping Strategies Utilized:   Connectedness and Spiritual comfort    Pt's wife stated that prayer is important to her and appreciated           22 1700   Clinical Encounter Type   Visited With Patient and family together   Routine Visit Introduction   Continue Visiting Yes   Crisis Visit Critical Care   Referral From

## 2022-11-22 LAB
ANION GAP SERPL CALCULATED.3IONS-SCNC: 4 MMOL/L (ref 4–13)
ANION GAP SERPL CALCULATED.3IONS-SCNC: 6 MMOL/L (ref 4–13)
BACTERIA BLD CULT: NORMAL
BACTERIA BLD CULT: NORMAL
BASOPHILS # BLD AUTO: 0.05 THOUSANDS/ÂΜL (ref 0–0.1)
BASOPHILS NFR BLD AUTO: 1 % (ref 0–1)
BUN SERPL-MCNC: 42 MG/DL (ref 5–25)
BUN SERPL-MCNC: 47 MG/DL (ref 5–25)
CALCIUM SERPL-MCNC: 7.6 MG/DL (ref 8.3–10.1)
CALCIUM SERPL-MCNC: 7.8 MG/DL (ref 8.3–10.1)
CHLORIDE SERPL-SCNC: 120 MMOL/L (ref 96–108)
CHLORIDE SERPL-SCNC: 121 MMOL/L (ref 96–108)
CO2 SERPL-SCNC: 22 MMOL/L (ref 21–32)
CO2 SERPL-SCNC: 25 MMOL/L (ref 21–32)
CREAT SERPL-MCNC: 1.51 MG/DL (ref 0.6–1.3)
CREAT SERPL-MCNC: 1.61 MG/DL (ref 0.6–1.3)
EOSINOPHIL # BLD AUTO: 0 THOUSAND/ÂΜL (ref 0–0.61)
EOSINOPHIL NFR BLD AUTO: 0 % (ref 0–6)
ERYTHROCYTE [DISTWIDTH] IN BLOOD BY AUTOMATED COUNT: 16.1 % (ref 11.6–15.1)
GFR SERPL CREATININE-BSD FRML MDRD: 46 ML/MIN/1.73SQ M
GFR SERPL CREATININE-BSD FRML MDRD: 50 ML/MIN/1.73SQ M
GLUCOSE SERPL-MCNC: 185 MG/DL (ref 65–140)
GLUCOSE SERPL-MCNC: 197 MG/DL (ref 65–140)
GLUCOSE SERPL-MCNC: 203 MG/DL (ref 65–140)
GLUCOSE SERPL-MCNC: 207 MG/DL (ref 65–140)
GLUCOSE SERPL-MCNC: 207 MG/DL (ref 65–140)
GLUCOSE SERPL-MCNC: 215 MG/DL (ref 65–140)
HCT VFR BLD AUTO: 24.1 % (ref 36.5–49.3)
HGB BLD-MCNC: 7.2 G/DL (ref 12–17)
IMM GRANULOCYTES # BLD AUTO: 0.12 THOUSAND/UL (ref 0–0.2)
IMM GRANULOCYTES NFR BLD AUTO: 1 % (ref 0–2)
LYMPHOCYTES # BLD AUTO: 0.83 THOUSANDS/ÂΜL (ref 0.6–4.47)
LYMPHOCYTES NFR BLD AUTO: 8 % (ref 14–44)
MAGNESIUM SERPL-MCNC: 2.7 MG/DL (ref 1.6–2.6)
MCH RBC QN AUTO: 27.8 PG (ref 26.8–34.3)
MCHC RBC AUTO-ENTMCNC: 29.9 G/DL (ref 31.4–37.4)
MCV RBC AUTO: 93 FL (ref 82–98)
MONOCYTES # BLD AUTO: 1.18 THOUSAND/ÂΜL (ref 0.17–1.22)
MONOCYTES NFR BLD AUTO: 11 % (ref 4–12)
NEUTROPHILS # BLD AUTO: 8.74 THOUSANDS/ÂΜL (ref 1.85–7.62)
NEUTS SEG NFR BLD AUTO: 79 % (ref 43–75)
NRBC BLD AUTO-RTO: 0 /100 WBCS
PHOSPHATE SERPL-MCNC: 3.3 MG/DL (ref 2.7–4.5)
PLATELET # BLD AUTO: 478 THOUSANDS/UL (ref 149–390)
PMV BLD AUTO: 10.4 FL (ref 8.9–12.7)
POTASSIUM SERPL-SCNC: 3.9 MMOL/L (ref 3.5–5.3)
POTASSIUM SERPL-SCNC: 4.1 MMOL/L (ref 3.5–5.3)
RBC # BLD AUTO: 2.59 MILLION/UL (ref 3.88–5.62)
SODIUM SERPL-SCNC: 149 MMOL/L (ref 135–147)
SODIUM SERPL-SCNC: 149 MMOL/L (ref 135–147)
WBC # BLD AUTO: 10.92 THOUSAND/UL (ref 4.31–10.16)

## 2022-11-22 RX ORDER — OXYCODONE HYDROCHLORIDE 10 MG/1
10 TABLET ORAL EVERY 6 HOURS PRN
Status: DISCONTINUED | OUTPATIENT
Start: 2022-11-22 | End: 2022-11-23

## 2022-11-22 RX ORDER — INSULIN GLARGINE 100 [IU]/ML
20 INJECTION, SOLUTION SUBCUTANEOUS
Status: DISCONTINUED | OUTPATIENT
Start: 2022-11-22 | End: 2022-11-23

## 2022-11-22 RX ORDER — HYDROMORPHONE HCL/PF 1 MG/ML
0.3 SYRINGE (ML) INJECTION
Status: DISCONTINUED | OUTPATIENT
Start: 2022-11-22 | End: 2022-11-23

## 2022-11-22 RX ORDER — ACETAZOLAMIDE 500 MG/5ML
500 INJECTION, POWDER, LYOPHILIZED, FOR SOLUTION INTRAVENOUS EVERY 6 HOURS SCHEDULED
Status: COMPLETED | OUTPATIENT
Start: 2022-11-22 | End: 2022-11-22

## 2022-11-22 RX ORDER — WATER 1000 ML/1000ML
INJECTION, SOLUTION INTRAVENOUS
Status: COMPLETED
Start: 2022-11-22 | End: 2022-11-22

## 2022-11-22 RX ORDER — WATER 1000 ML/1000ML
INJECTION, SOLUTION INTRAVENOUS
Status: DISPENSED
Start: 2022-11-22 | End: 2022-11-23

## 2022-11-22 RX ADMIN — HEPARIN SODIUM 5000 UNITS: 5000 INJECTION INTRAVENOUS; SUBCUTANEOUS at 15:37

## 2022-11-22 RX ADMIN — INSULIN GLARGINE 20 UNITS: 100 INJECTION, SOLUTION SUBCUTANEOUS at 23:04

## 2022-11-22 RX ADMIN — OXYCODONE HYDROCHLORIDE 5 MG: 5 SOLUTION ORAL at 17:58

## 2022-11-22 RX ADMIN — Medication 0.5 MCG/KG/HR: at 10:15

## 2022-11-22 RX ADMIN — INSULIN LISPRO 4 UNITS: 100 INJECTION, SOLUTION INTRAVENOUS; SUBCUTANEOUS at 23:05

## 2022-11-22 RX ADMIN — OXYCODONE HYDROCHLORIDE 10 MG: 10 TABLET ORAL at 22:20

## 2022-11-22 RX ADMIN — ACETAMINOPHEN 650 MG: 325 TABLET ORAL at 17:40

## 2022-11-22 RX ADMIN — PANTOPRAZOLE SODIUM 40 MG: 40 INJECTION, POWDER, FOR SOLUTION INTRAVENOUS at 10:00

## 2022-11-22 RX ADMIN — FENTANYL CITRATE 50 MCG: 50 INJECTION INTRAMUSCULAR; INTRAVENOUS at 04:46

## 2022-11-22 RX ADMIN — OXYCODONE HYDROCHLORIDE 5 MG: 5 SOLUTION ORAL at 12:10

## 2022-11-22 RX ADMIN — ACETAZOLAMIDE 500 MG: 500 INJECTION, POWDER, LYOPHILIZED, FOR SOLUTION INTRAVENOUS at 20:13

## 2022-11-22 RX ADMIN — OXYCODONE HYDROCHLORIDE 5 MG: 5 SOLUTION ORAL at 06:06

## 2022-11-22 RX ADMIN — ERTAPENEM SODIUM 1000 MG: 1 INJECTION, POWDER, LYOPHILIZED, FOR SOLUTION INTRAMUSCULAR; INTRAVENOUS at 20:13

## 2022-11-22 RX ADMIN — ATORVASTATIN CALCIUM 40 MG: 40 TABLET, FILM COATED ORAL at 17:41

## 2022-11-22 RX ADMIN — INSULIN LISPRO 4 UNITS: 100 INJECTION, SOLUTION INTRAVENOUS; SUBCUTANEOUS at 12:10

## 2022-11-22 RX ADMIN — CHLORHEXIDINE GLUCONATE 15 ML: 1.2 SOLUTION ORAL at 08:50

## 2022-11-22 RX ADMIN — ACETAZOLAMIDE 500 MG: 500 INJECTION, POWDER, LYOPHILIZED, FOR SOLUTION INTRAVENOUS at 15:37

## 2022-11-22 RX ADMIN — Medication 0.5 MCG/KG/HR: at 02:28

## 2022-11-22 RX ADMIN — WATER: 1 INJECTION INTRAMUSCULAR; INTRAVENOUS; SUBCUTANEOUS at 15:39

## 2022-11-22 RX ADMIN — CHLORHEXIDINE GLUCONATE 15 ML: 1.2 SOLUTION ORAL at 20:13

## 2022-11-22 RX ADMIN — Medication 20 MG: at 17:41

## 2022-11-22 RX ADMIN — ACETAMINOPHEN 650 MG: 325 TABLET ORAL at 04:33

## 2022-11-22 RX ADMIN — INSULIN LISPRO 2 UNITS: 100 INJECTION, SOLUTION INTRAVENOUS; SUBCUTANEOUS at 18:16

## 2022-11-22 RX ADMIN — INSULIN LISPRO 2 UNITS: 100 INJECTION, SOLUTION INTRAVENOUS; SUBCUTANEOUS at 06:07

## 2022-11-22 RX ADMIN — HEPARIN SODIUM 5000 UNITS: 5000 INJECTION INTRAVENOUS; SUBCUTANEOUS at 22:19

## 2022-11-22 RX ADMIN — HEPARIN SODIUM 5000 UNITS: 5000 INJECTION INTRAVENOUS; SUBCUTANEOUS at 06:07

## 2022-11-22 NOTE — PROGRESS NOTES
Progress Note - Infectious Disease   Merritt Whiting 61 y o  male MRN: [id number]  Unit/Bed#: [facility] 517-01 Encounter: [id number]      Impression/Recommendations:  1  Sepsis, secondary to intra-abdominal infection and bacteremia   Patient is clinically much improved on antibiotic and status post I&D/bowel resection   Temperature up and down but trending   WBC is now normal   He is currently not on any pressors  Antibiotic plan as in below  Monitor temperature/WBC  Monitor hemodynamics      2  ESBL producing E coli bacteremia, likely secondary to gut translocation during recent colon surgery   Patient is clinically improved   Bacteremia cleared   Patient completed antibiotic course for this but given probable anastomotic leak, will continue antibiotic for that indication  No further antibiotic needed for this      3  Anastomotic leak up with intra-abdominal abscess/infected hematoma   Patient is status post exploratory laparotomy, with drainage of abscess and cecum resection   Operative culture growing ESBL producing E coli again, with lactobacillus   Patient had repeat ex lap, with healthy-appearing bowel  Repeat CT with resolved intra-abdominal collections  Continue IV ertapenem  Treat x7 days postop, through 11/24      4  Metastatic colon cancer, with liver metastases   Patient is status post transverse colon resection  Surgical oncology follow-up      5  ALEXY, superimposed on CKD   Creatinine worsen postop but improving again  Antibiotic at full dose for now  Monitor creatinine       6  Acute hypoxic respiratory failure, previously on ventilator   Patient was extubated but now intubated again postop   No clinical pneumonia  Monitor respiratory status      7  Encephalopathy, multifactorial   Mental status was improved but now sedated on ventilator   He is responsive and attempts to communicate  on ventilator  Monitor mental status      8  Type 2 DM      Discussed with patient      Antibiotics:  Ertapenem # 14  POD # 6/5     Subjective:  [address] ventilator, in ICU  He is  awake and alert, attempts to communicate  Some abdominal discomfort  Temperature trending down  He is tolerating antibiotic well   No nausea, vomiting or diarrhea      Objective:  Vitals:  Temp:  [99 1 °F (37 3 °C)-100 6 °F (38 1 °C)] 99 7 °F (37 6 °C)  HR:  [67-92] 75  Resp:  [15-28] 28  BP: ()/(56-75) 114/66  SpO2:  [93 %-99 %] 95 %  Temp (24hrs), Av 1 °F (37 8 °C), Min:99 1 °F (37 3 °C), Max:100 6 °F (38 1 °C)  Current: Temperature: 99 7 °F (37 6 °C)    Physical Exam:     General: Awake, alert on ventilator, attempts to communicate, comfortable, nontoxic  Neck:  Supple  No mass  No lymphadenopathy  Lungs: Expansion symmetric, stable basilar rhonchi and rales, no wheezing  Heart:  Regular rate and rhythm, S1 and S2 normal, no murmur  Abdomen: Soft, mild distention, wound with VAC, without purulence  Mild tenderness  Extremities: 1+ leg edema  No erythema/warmth  No ulcer  Nontender to palpation  Skin:  No rash  Neuro: Moves all extremities  Invasive Devices     Central Venous Catheter Line  Duration           Port A Cath 03/10/22 Right Chest 256 days          Peripheral Intravenous Line  Duration           Peripheral IV 22 Left;Ventral (anterior) Forearm 3 days    Peripheral IV 22 Right;Ventral (anterior) Forearm 2 days    Peripheral IV 22 Left;Upper;Ventral (anterior) Arm <1 day          Drain  Duration           Urethral Catheter 16 Fr  6 days    Closed/Suction Drain Lateral;Right Abdomen 5 days    Closed/Suction Drain Left Abdomen Bulb 19 Fr  5 days    NG/OG/Enteral Tube Nasogastric 5 days    Ileostomy LUQ 4 days          Airway  Duration           ETT  Cuffed; Hi-Lo 8 mm 5 days                Labs studies:   I have personally reviewed pertinent labs    Results from last 7 days   Lab Units 22  0606 22  0454 22  0602 11/19/22  0500 11/18/22  2122 11/18/22  0447 11/17/22  1603 11/17/22  0423 11/16/22  1546 11/16/22  1343   POTASSIUM mmol/L 4 1 3 6 3 7 3 8   < > 4 8   < > 4 8   < >  --    CHLORIDE mmol/L 120* 118* 115* 110*   < > 109*   < > 111*   < >  --    CO2 mmol/L 25 24 24 22   < > 21   < > 23   < >  --    CO2, I-STAT mmol/L  --   --   --   --   --   --   --   --   --  27   BUN mg/dL 47* 55* 65* 56*   < > 63*   < > 44*   < >  --    CREATININE mg/dL 1 61* 1 83* 2 34* 2 93*   < > 2 91*   < > 2 17*   < >  --    EGFR ml/min/1 73sq m 46 39 29 22   < > 22   < > 32   < >  --    GLUCOSE, ISTAT mg/dl  --   --   --   --   --   --   --   --   --  151*   CALCIUM mg/dL 7 8* 8 0* 7 7* 8 2*   < > 7 9*   < > 7 7*   < >  --    AST U/L  --   --   --  58*  --  53*  --  38   < >  --    ALT U/L  --   --   --  12  --  13  --  18   < >  --    ALK PHOS U/L  --   --   --  141*  --  163*  --  158*   < >  --     < > = values in this interval not displayed  Results from last 7 days   Lab Units 11/22/22  0606 11/21/22  0454 11/20/22  0602   WBC Thousand/uL 10 92* 9 75 11 32*   HEMOGLOBIN g/dL 7 2* 7 6* 7 7*   PLATELETS Thousands/uL 478* 498* 467*     Results from last 7 days   Lab Units 11/17/22  0016 11/16/22  1315 11/16/22  1314   BLOOD CULTURE  No Growth After 5 Days  No Growth After 5 Days  --   --    GRAM STAIN RESULT   --  No Polys or Bacteria seen No Polys or Bacteria seen       Imaging Studies:   I have personally reviewed pertinent imaging study reports and images in PACS  Chest/abdomen/pelvis CT reviewed personally  No PE  Moderate bilateral pleural effusion with atelectasis  No residual intra-abdominal collection  Stable hepatic metastases  EKG, Pathology, and Other Studies:   I have personally reviewed pertinent reports

## 2022-11-22 NOTE — RESPIRATORY THERAPY NOTE
RT Ventilator Management Note  Sushant Tenorio 61 y o  male MRN: [id number]  Unit/Bed#: [facility] 569-32 Encounter: [id number]      Daily Screen         11/21/2022  0803 11/21/2022  1134          Patient safety screen outcome[de-identified] Passed Passed      Not Ready for Weaning due to[de-identified] -- --                Physical Exam:   Assessment Type: (P) Assess only  General Appearance: (P) Sedated  Respiratory Pattern: (P) Assisted  Chest Assessment: (P) Chest expansion symmetrical  Bilateral Breath Sounds: (P) Clear, Diminished  Cough: (P) Non-productive, Weak  Suction: (P) ET Tube  O2 Device: (P) vent      Resp Comments: (P) Pt doing well on current vent settings, Pt suctioned for small amount of secretions,no other changes made at this time, will continue to monitor

## 2022-11-22 NOTE — QUICK NOTE
11/22/2022 9:47 AM -  Preet Sommer's chart and case were reviewed by Sanjuana Anderson MD   Mode of review included electronic chart check  Palliative Care will re-engage for symptom management once patient hopefully extubated  Please do not hesitate to reach out to our team with any questions or concerns  For urgent issues or any questions/concerns, please notify on-call provider via [employer]  You may also call our answering service 24/7 at   Sanjuana Anderson MD  Palliative and Supportive Care  Clinic/Answering Service: [phone]  You can find me on [employer]!

## 2022-11-22 NOTE — RESPIRATORY THERAPY NOTE
Extubation Note       11/22/22 1600   Respiratory Assessment   Assessment Type Pre-treatment   General Appearance Awake;Drowsy   Respiratory Pattern Spontaneous   Chest Assessment Chest expansion symmetrical   Bilateral Breath Sounds Coarse; Inspiratory wheezes   R Breath Sounds Clear   L Breath Sounds Clear   Cough Non-productive;Weak   Suction Oral   Resp Comments Extubated uneventfully and directly onto BiPAP per MD order  MDs and RNs present and assisted with BiPAP placement  Cuff leak test completed before extubation began  Pt's beard and NG tube making seal with LARGE sized full face mask a bit challenging  Getting good Vt returns (> 550 mls) even with 55% leak  Pt immediately able to phonate and correctly identify the calendar year  Humidifier turned on through V60 on noninvasive mode  O2 Device V 60 BiPAP post extubation     Non-Invasive Information   O2 Interface Device Full face mask   Non-Invasive Ventilation Mode BiPAP   $ Continous NIV Initial   SpO2 98 %   $ Pulse Oximetry Spot Check Charge Completed   Non-Invasive Settings   FiO2 (%) 40   Temperature (Set) 31   IPAP (cm) 14 cm   EPAP (cm) 8 cm   Rate (Set) 8   Rise Time 2   Inspiratory Time (Set) 1   Humidification 31   Non-Invasive Readings   Skin Intervention Skin intact   Heater Temperature (Obs) 31   Total Rate 24   Vt (mL) (Mech) 560   MV (Mech) 13 6   Peak Pressure (Obs) 14   Spontaneous Vt (mL) 560   Leak (lpm) 31   Non-Invasive Alarms   Insp Pressure High (cm H20) 50   Insp Pressure Low (cm H20) 5   Low Insp Pressure Time (sec) 20 sec   MV Low (L/min) 3   Vt High (mL) 2000   Vt Low (mL) 90   High Resp Rate (BPM) 34 BPM   Low Resp Rate (BPM) 10 BPM   Apnea Interval (sec) 20   Maintenance   Water bag changed No

## 2022-11-22 NOTE — NUTRITION
11/22/22 6613   Recommendations/Interventions   Recommendations to Provider If pt remains intubated, recommend advancing tube feed to goal rate to meet estimated nutrition needs  Pended order for Vital High Protein, increase to 45mL/hr, advance by 10mL/hr q8 hrs as tolerated to new goal rate of 65mL/hr  D/c Prosource protein liquid packets  This would provide 1560 kcals, 136g protein, 1304mL free water   Additional free water flushes per primary team

## 2022-11-22 NOTE — PROGRESS NOTES
Daily Progress Note - Critical Care   Gene Lynn 61 y o  male MRN: [id number]  Unit/Bed#: [facility] 517-01 Encounter: [id number]    ----------------------------------------------------------------------------------------  HPI/24hr events: Patient remained intubated and sedated overnight  Tmax was 100 6F  Pending final read on CTPE with abd/pelvis with contrast from 11/21  Wound vac in place to 125mmHg suction       ---------------------------------------------------------------------------------------  SUBJECTIVE  DONNA secondary to ETT in place  Review of Systems   Unable to perform ROS: Intubated     Review of systems was unable to be performed secondary to Intubation  ---------------------------------------------------------------------------------------  Assessment and Plan:  • Acute post operative pain/sedation  ? Plan:   - Precedex  - Consider restarting fentanyl drip for pain control   - Consider adjuncts post extubation as poor pain control has led to poor inspiratory effort in past    • Acute metabolic encephalopathy (resolved)  ? Plan:   - Frequent neuro checks        CV:   • Hypotension  ? Plan:   - Off pressors  - Goal goal MAP >65  - Continuous tele    • Paroxysmal atrial tachycardia (resolved)  ? Plan:   - Continuous cardiac monitoring    • Hypertension (chronic)  ? Plan:   - Holding home amlodipine and lisinopril    • Hyperlipidemia (chronic)  ? Plan:   - Continue atorvastatin         Pulm:  • Acute hypoxic ventilatory dependent respiratory failure  ? Plan:   - Requires higher pressure support while on PS  - Consider diuresing today  - Plan to perform SBT and potentially extubate today; will likely extubate to BiPAP given PEEP requirement    • Pleural effusion  • 11/21 CXR: evidence of edema and pleural effusions  ? Plan:  - Continue to monitor respiratory status  - Consider diuresing with IV lasix again today        GI:   • Metastatic colon cancer  ?  S/p ex lap, transverse colon resection, reversal of loop colostomy, peritoneal biopsy and segment 3 liver resection on 11/7  ? 11/16: Anastomotic leak s/p ex lap, R hemicolectomy  ? 11/17: S/p diverting loop ileostomy  ? Plan:   - Monitor drain/VAC output  - Serial abdominal exams  - Surgery following, appreciate recs    Bowel regimen:   - Colostomy in place with bag collection appliance  - Plan: consider starting bowel reg to maintain stool output with concurrent opioid pain regimen     :   • ALEXY on stage 3 CKD  ? Plan:   - Improving, pending labs this morning  - Consider diuresis today goal negative 1,000mL  - Strict I&Os        F/E/N:   • Plan:  ? F - none  ? E - monitor and replete as needed, goal Mag >2, Phos >3, K >4  ? N - TF Vital @30cc/hr        Heme/Onc:   • Acute blood loss anemia  ? Plan:   - Stable hgb this AM  - Monitor for s/s bleeding, non currently  - Transfuse for hgb <7 0    • Diagnosis: DVT ppx  ? Plan:   - Continue heparin SQ        Endo:   • T2DM with hyperglycemia  ? Plan:   - SSI algorithm 4  - Lantus 15 U at bedtime  - Goal -180        ID:   • Severe sepsis, ESBL bacteremia  ? Plan:   - Repeat bcx 11/17 with no growth at 48 hours  - On ertapenem; will continue u ntil 11/24 per ID recs  - Febrile overnight, slight increase in WBC  - ID following appreciate ongoing recs        MSK/Skin:   • Surgical sites  ? Plan:   - Surgical site care as per surgery, wound vac changed yesterday  - PT/OT when able  - Frequent turns/reposition      Patient appropriate for transfer out of the ICU today?: No  Disposition: Continue Critical Care   Code Status: Level 1 - Full Code  ---------------------------------------------------------------------------------------  ICU CORE MEASURES    Prophylaxis   VTE Pharmacologic Prophylaxis: Heparin  VTE Mechanical Prophylaxis: sequential compression device  Stress Ulcer Prophylaxis: frequent BMs; will re-evaulate daily    ABCDE Protocol (if indicated)  Plan to perform spontaneous awakening trial today? Yes  Plan to perform spontaneous breathing trial today? Yes  Obvious barriers to extubation? Yes  CAM-ICU: Negative    Invasive Devices Review  Invasive Devices     Central Venous Catheter Line  Duration           Port A Cath 03/10/22 Right Chest 256 days          Peripheral Intravenous Line  Duration           Peripheral IV 22 Left;Ventral (anterior) Forearm 3 days    Peripheral IV 22 Right;Ventral (anterior) Forearm 2 days    Peripheral IV 22 Left;Upper;Ventral (anterior) Arm <1 day          Drain  Duration           Urethral Catheter 16 Fr  6 days    Closed/Suction Drain Lateral;Right Abdomen 5 days    Closed/Suction Drain Left Abdomen Bulb 19 Fr  5 days    NG/OG/Enteral Tube Nasogastric 5 days    Ileostomy LUQ 4 days          Airway  Duration           ETT  Cuffed; Hi-Lo 8 mm 5 days              Can any invasive devices be discontinued today? No  ---------------------------------------------------------------------------------------  OBJECTIVE    Vitals   Vitals:    22 0100 22 0336 22 0500 22 0600   BP: 116/75  123/69 115/66   Pulse: 90  86 85   Resp: 17  20 18   Temp: (!) 100 6 °F (38 1 °C)  (!) 100 6 °F (38 1 °C) 99 5 °F (37 5 °C)   TempSrc:       SpO2: 98% 98% 98% 93%   Weight:       Height:         Temp (24hrs), Av 1 °F (37 8 °C), Min:99 1 °F (37 3 °C), Max:100 6 °F (38 1 °C)  Current: Temperature: 99 5 °F (37 5 °C)  HR: 83  BP: 99/56  RR: 17  SpO2: 97 on MV FiO2 40%  Temp: 99 3F    Respiratory:  SpO2 Device: O2 Device: Ventilator  Nasal Cannula O2 Flow Rate (L/min): 6 L/min    Invasive/non-invasive ventilation settings   Respiratory    Lab Data (Last 4 hours)    None         O2/Vent Data (Last 4 hours)    None                Physical Exam  Vitals reviewed  Constitutional:       General: He is not in acute distress  Appearance: He is ill-appearing and diaphoretic  HENT:      Head: Normocephalic and atraumatic        Mouth/Throat:      Mouth: Mucous membranes are dry  Eyes:      General: No scleral icterus  Pupils: Pupils are equal, round, and reactive to light  Cardiovascular:      Rate and Rhythm: Normal rate and regular rhythm  Pulses: Normal pulses  Heart sounds: No murmur heard  No friction rub  No gallop  Pulmonary:      Effort: No respiratory distress  Breath sounds: No wheezing or rales  Comments: ETT in place on MV 40%/10    Abdominal:      General: There is distension  Tenderness: There is abdominal tenderness  There is no rebound  Comments: Midline surgical incision with abthera wound vac in place; clean, dry, intact  Left VICTORINO drain; low output  Right VICTORINO drain: maroon output  Colostomy and bag appliance in place with watery brown stool assessed in bag  Genitourinary:     Comments: Indwelling rivero catheter in place  Musculoskeletal:      Cervical back: No tenderness  Right lower leg: Edema present  Left lower leg: Edema present  Lymphadenopathy:      Cervical: No cervical adenopathy  Skin:     General: Skin is warm  Capillary Refill: Capillary refill takes less than 2 seconds  Coloration: Skin is pale  Findings: Bruising present  Neurological:      Comments: DONNA  Patient intubated and sedated  Follows simple commands this morning  Pupils: 1 5mm equal and brisk reactive to light  Psychiatric:      Comments: [facility]  Patient intubated and sedated               Laboratory and Diagnostics:  Results from last 7 days   Lab Units 11/22/22  0606 11/21/22  0454 11/20/22  0602 11/19/22  1337 11/19/22  0500 11/19/22  0041 11/18/22  2241 11/18/22  0447 11/17/22  1603 11/17/22  0423 11/16/22  2328 11/16/22  1546   WBC Thousand/uL 10 92* 9 75 11 32* 14 21* 17 72*  --  17 07* 27 12*   < > 22 89*   < > 25 84*   HEMOGLOBIN g/dL 7 2* 7 6* 7 7* 7 0* 7 1* 6 3* 6 4* 8 1*   < > 8 4*   < > 10 7*   HEMATOCRIT % 24 1* 24 6* 24 0* 20 9* 22 0*  --  19 7* 24 9*   < > 25 4*   < > 33 9*   PLATELETS Thousands/uL 478* 498* 467* 402* 386  --  384 410*   < > 346   < > 454*   NEUTROS PCT % 79* 76* 85* 88* 87*  --   --  87*  --  85*   < >  --    BANDS PCT %  --   --   --   --   --   --   --   --   --   --   --  9*   MONOS PCT % 11 12 9 7 8  --   --  8  --  8   < >  --    MONO PCT %  --   --   --   --   --   --   --   --   --   --   --  0*    < > = values in this interval not displayed  Results from last 7 days   Lab Units 11/22/22  0606 11/21/22  0454 11/20/22  0602 11/19/22  0500 11/19/22  0041 11/18/22  2122 11/18/22  0447 11/17/22  1603 11/17/22  0423 11/16/22  2328 11/16/22  1546   SODIUM mmol/L 149* 147 147 143 142 142 141   < > 140   < > 143   POTASSIUM mmol/L 4 1 3 6 3 7 3 8 4 2 4 2 4 8   < > 4 8   < > 4 2   CHLORIDE mmol/L 120* 118* 115* 110* 110* 111* 109*   < > 111*   < > 113*   CO2 mmol/L 25 24 24 22 23 21 21   < > 23   < > 22   ANION GAP mmol/L 4 5 8 11 9 10 11   < > 6   < > 8   BUN mg/dL 47* 55* 65* 56* 71* 70* 63*   < > 44*   < > 31*   CREATININE mg/dL 1 61* 1 83* 2 34* 2 93* 3 14* 3 19* 2 91*   < > 2 17*   < > 1 33*   CALCIUM mg/dL 7 8* 8 0* 7 7* 8 2* 8 4 8 1* 7 9*   < > 7 7*   < > 7 5*   GLUCOSE RANDOM mg/dL 207* 174* 153* 159* 166* 151* 175*   < > 220*   < > 177*   ALT U/L  --   --   --  12  --   --  13  --  18  --  23   AST U/L  --   --   --  58*  --   --  53*  --  38  --  40   ALK PHOS U/L  --   --   --  141*  --   --  163*  --  158*  --  220*   ALBUMIN g/dL  --   --   --  1 9*  --   --  1 6*  --  2 0*  --  1 4*   TOTAL BILIRUBIN mg/dL  --   --   --  3 33*  --   --  2 85*  --  2 20*  --  1 53*    < > = values in this interval not displayed       Results from last 7 days   Lab Units 11/22/22  0606 11/21/22  0454 11/20/22  0602 11/19/22  0500 11/18/22 2122 11/18/22  0447 11/17/22  0423   MAGNESIUM mg/dL 2 7* 3 0* 2 9* 2 9* 2 9* 2 6 2 3   PHOSPHORUS mg/dL 3 3 3 8 5 5* 8 3* 8 1* 8 7* 7 3*               Results from last 7 days   Lab Units 11/18/22 2122 11/17/22  1603 11/16/22  2328 11/16/22  1546 22  0430   LACTIC ACID mmol/L 1 3 1 5 1 6 1 4 1 6     ABG:  Results from last 7 days   Lab Units 22   PH ART  7 424   PCO2 ART mm Hg 34 6*   PO2 ART mm Hg 133 7*   HCO3 ART mmol/L 22 1   BASE EXC ART mmol/L -2 0   ABG SOURCE  Radial, Left     VBG:  Results from last 7 days   Lab Units 22  2102 22  1547 22  0430   PH RUMA   --   --  7 420*   PCO2 RUMA mm Hg  --   --  40 1*   PO2 RUMA mm Hg  --   --  39 0   HCO3 RUMA mmol/L  --   --  25 4   BASE EXC RUMA mmol/L  --   --  0 9   ABG SOURCE  Radial, Left   < >  --     < > = values in this interval not displayed  Micro  Results from last 7 days   Lab Units 22  0016 22  1315 22  1314   BLOOD CULTURE  No Growth After 4 Days  No Growth After 4 Days  --   --    GRAM STAIN RESULT   --  No Polys or Bacteria seen No Polys or Bacteria seen     Imagin/21 CT pe study with abdomen pelvis w contrast: pending official read     CXR:  Evidence of edema and pleural effusions  Low lung volumes with atelectasis  Support tubes and lines as above  I have personally reviewed pertinent reports  and I have personally reviewed pertinent films in PACS    Intake and Output  I/O        0701   0700  0701   0700    P  O  0 0    I V  (mL/kg) 954 9 (8 1) 455 1 (3 9)    NG/ 1235    IV Piggyback 150 250    Feedings 423 398    Total Intake(mL/kg) 1682 9 (14 3) 2338 1 (19 8)    Urine (mL/kg/hr) 2850 (1) 2540 (0 9)    Emesis/NG output 0 0    Drains 120 140    Stool 100 475    Total Output 3070 3155    Net -1387 2 -816 9              UOP: 100 ml/hr     Height and Weights   Height: 5' 8" (172 7 cm)  IBW (Ideal Body Weight): 68 4 kg  Body mass index is 39 45 kg/m²    Weight (last 2 days)     None            Nutrition       Diet Orders   (From admission, onward)             Start     Ordered    22 0828  Diet Enteral/Parenteral; Tube Feeding No Oral Diet; Vital High Protein; Continuous; 30; Prosource Protein Liquid - Three Packets Daily  Diet effective now        Comments: Trickle, do not advance   References:    Nutrtion Support Algorithm Enteral vs  Parenteral   Question Answer Comment   Diet Type Enteral/Parenteral    Enteral/Parenteral Tube Feeding No Oral Diet    Tube Feeding Formula: Vital High Protein    Bolus/Cyclic/Continuous Continuous    Tube Feeding Goal Rate (mL/hr): 30    Prosource Protein Liquid - No Carb Prosource Protein Liquid - Three Packets Daily    RD to adjust diet per protocol? No        11/21/22 0828              TF currently running at 30 ml/hr with a goal of 30 ml/hr   Formula: Vital High      Active Medications  Scheduled Meds:  Current Facility-Administered Medications   Medication Dose Route Frequency Provider Last Rate   • acetaminophen  650 mg Oral Q4H PRN Dariel Heimlich, MD     • atorvastatin  40 mg Oral After Shea Arechiga DO     • chlorhexidine  15 mL Mouth/Throat Q12H [address] GAURANG Foster     • dexmedetomidine  0 1-0 7 mcg/kg/hr Intravenous Titrated Ava Diaz MD 0 5 mcg/kg/hr (11/22/22 0228)   • ertapenem  1,000 mg Intravenous Q24H Ava Diaz MD Stopped (11/21/22 1845)   • fentanyl citrate (PF)  50 mcg Intravenous Q1H PRN GAURANG Foster     • heparin (porcine)  5,000 Units Subcutaneous [facility] GAURANG Foster     • HYDROmorphone  0 5 mg Intravenous Q3H PRN Noretta Gowers, MD     • insulin glargine  15 Units Subcutaneous HS GAURANG Foster     • insulin lispro  2-12 Units Subcutaneous Q6H [address] GAURANG Acharya     • ondansetron  4 mg Intravenous Q4H PRN GAURANG Foster     • oxyCODONE  5 mg Oral Q6H Sharad Frazier PA-C     • pantoprazole  40 mg Intravenous Q24H [address] GAURANG Acharya       Continuous Infusions:  dexmedetomidine, 0 1-0 7 mcg/kg/hr, Last Rate: 0 5 mcg/kg/hr (11/22/22 0228)      PRN Meds:   acetaminophen, 650 mg, Q4H PRN  fentanyl citrate (PF), 50 mcg, Q1H PRN  HYDROmorphone, 0 5 mg, Q3H PRN  ondansetron, 4 mg, Q4H PRN        Allergies   Allergies   Allergen Reactions   • Shellfish-Derived Products - Food Allergy Anaphylaxis   • Erythromycin GI Intolerance     ---------------------------------------------------------------------------------------  Advance Directive and Living Will:      Power of :    POLST:    ---------------------------------------------------------------------------------------  Care Time Delivered:   No Critical Care time spent     GAURANG Hathaway      Portions of the record may have been created with voice recognition software  Occasional wrong word or "sound a like" substitutions may have occurred due to the inherent limitations of voice recognition software    Read the chart carefully and recognize, using context, where substitutions have occurred

## 2022-11-22 NOTE — RESPIRATORY THERAPY NOTE
RT Ventilator Management Note  Judy Cervantes 61 y o  male MRN: [id number]  Unit/Bed#: [facility] 086-66 Encounter: [id number]      Daily Screen         11/21/2022  0803 11/21/2022  1134          Patient safety screen outcome[de-identified] Passed Passed      Not Ready for Weaning due to[de-identified] -- --                Physical Exam:   Assessment Type: Assess only  General Appearance: Sedated  Respiratory Pattern: (P) Assisted  Chest Assessment: (P) Chest expansion symmetrical  Bilateral Breath Sounds: (P) Clear, Diminished  Cough: (P) Non-productive, Weak  Suction: (P) ET Tube  O2 Device: (P) vent      Resp Comments: (P) Pt taken to CT, Pt did well with transport, Pt back in room on vent CMV, no complications noted, will continue to monitor

## 2022-11-23 LAB
ANION GAP SERPL CALCULATED.3IONS-SCNC: 4 MMOL/L (ref 4–13)
ATRIAL RATE: 93 BPM
BASOPHILS # BLD AUTO: 0.03 THOUSANDS/ÂΜL (ref 0–0.1)
BASOPHILS NFR BLD AUTO: 0 % (ref 0–1)
BUN SERPL-MCNC: 42 MG/DL (ref 5–25)
CALCIUM SERPL-MCNC: 8 MG/DL (ref 8.3–10.1)
CHLORIDE SERPL-SCNC: 122 MMOL/L (ref 96–108)
CO2 SERPL-SCNC: 23 MMOL/L (ref 21–32)
CREAT SERPL-MCNC: 1.59 MG/DL (ref 0.6–1.3)
EOSINOPHIL # BLD AUTO: 0.21 THOUSAND/ÂΜL (ref 0–0.61)
EOSINOPHIL NFR BLD AUTO: 2 % (ref 0–6)
ERYTHROCYTE [DISTWIDTH] IN BLOOD BY AUTOMATED COUNT: 16.3 % (ref 11.6–15.1)
GFR SERPL CREATININE-BSD FRML MDRD: 47 ML/MIN/1.73SQ M
GLUCOSE SERPL-MCNC: 243 MG/DL (ref 65–140)
GLUCOSE SERPL-MCNC: 252 MG/DL (ref 65–140)
GLUCOSE SERPL-MCNC: 274 MG/DL (ref 65–140)
GLUCOSE SERPL-MCNC: 290 MG/DL (ref 65–140)
HCT VFR BLD AUTO: 28.4 % (ref 36.5–49.3)
HGB BLD-MCNC: 8.5 G/DL (ref 12–17)
IMM GRANULOCYTES # BLD AUTO: 0.16 THOUSAND/UL (ref 0–0.2)
IMM GRANULOCYTES NFR BLD AUTO: 1 % (ref 0–2)
LYMPHOCYTES # BLD AUTO: 0.93 THOUSANDS/ÂΜL (ref 0.6–4.47)
LYMPHOCYTES NFR BLD AUTO: 7 % (ref 14–44)
MAGNESIUM SERPL-MCNC: 2.6 MG/DL (ref 1.6–2.6)
MCH RBC QN AUTO: 28.7 PG (ref 26.8–34.3)
MCHC RBC AUTO-ENTMCNC: 29.9 G/DL (ref 31.4–37.4)
MCV RBC AUTO: 96 FL (ref 82–98)
MONOCYTES # BLD AUTO: 1 THOUSAND/ÂΜL (ref 0.17–1.22)
MONOCYTES NFR BLD AUTO: 8 % (ref 4–12)
NEUTROPHILS # BLD AUTO: 10.45 THOUSANDS/ÂΜL (ref 1.85–7.62)
NEUTS SEG NFR BLD AUTO: 82 % (ref 43–75)
NRBC BLD AUTO-RTO: 0 /100 WBCS
P AXIS: 48 DEGREES
PHOSPHATE SERPL-MCNC: 3.8 MG/DL (ref 2.7–4.5)
PLATELET # BLD AUTO: 532 THOUSANDS/UL (ref 149–390)
PMV BLD AUTO: 10.8 FL (ref 8.9–12.7)
POTASSIUM SERPL-SCNC: 4.5 MMOL/L (ref 3.5–5.3)
PR INTERVAL: 170 MS
PROCALCITONIN SERPL-MCNC: 1.54 NG/ML
QRS AXIS: 8 DEGREES
QRSD INTERVAL: 140 MS
QT INTERVAL: 388 MS
QTC INTERVAL: 482 MS
RBC # BLD AUTO: 2.96 MILLION/UL (ref 3.88–5.62)
SODIUM SERPL-SCNC: 149 MMOL/L (ref 135–147)
T WAVE AXIS: -3 DEGREES
VENTRICULAR RATE: 93 BPM
WBC # BLD AUTO: 12.78 THOUSAND/UL (ref 4.31–10.16)

## 2022-11-23 RX ORDER — INSULIN GLARGINE 100 [IU]/ML
30 INJECTION, SOLUTION SUBCUTANEOUS
Status: DISCONTINUED | OUTPATIENT
Start: 2022-11-23 | End: 2022-11-23

## 2022-11-23 RX ORDER — DULOXETIN HYDROCHLORIDE 60 MG/1
60 CAPSULE, DELAYED RELEASE ORAL DAILY
Status: DISCONTINUED | OUTPATIENT
Start: 2022-11-24 | End: 2022-11-29

## 2022-11-23 RX ORDER — ACETAMINOPHEN 325 MG/1
650 TABLET ORAL EVERY 6 HOURS SCHEDULED
Status: DISCONTINUED | OUTPATIENT
Start: 2022-11-23 | End: 2022-12-03 | Stop reason: HOSPADM

## 2022-11-23 RX ORDER — OXYCODONE HCL 5 MG/5 ML
10 SOLUTION, ORAL ORAL EVERY 4 HOURS PRN
Status: DISCONTINUED | OUTPATIENT
Start: 2022-11-23 | End: 2022-11-24

## 2022-11-23 RX ORDER — HYDROMORPHONE HCL/PF 1 MG/ML
0.3 SYRINGE (ML) INJECTION
Status: DISCONTINUED | OUTPATIENT
Start: 2022-11-23 | End: 2022-12-03 | Stop reason: HOSPADM

## 2022-11-23 RX ORDER — FOLIC ACID 1 MG/1
1 TABLET ORAL DAILY
Status: DISCONTINUED | OUTPATIENT
Start: 2022-11-23 | End: 2022-11-29

## 2022-11-23 RX ORDER — OXYCODONE HCL 5 MG/5 ML
5 SOLUTION, ORAL ORAL EVERY 6 HOURS
Status: DISCONTINUED | OUTPATIENT
Start: 2022-11-23 | End: 2022-11-25

## 2022-11-23 RX ORDER — GABAPENTIN 100 MG/1
100 CAPSULE ORAL 2 TIMES DAILY
Status: DISCONTINUED | OUTPATIENT
Start: 2022-11-23 | End: 2022-11-29

## 2022-11-23 RX ORDER — LANOLIN ALCOHOL/MO/W.PET/CERES
100 CREAM (GRAM) TOPICAL DAILY
Status: DISCONTINUED | OUTPATIENT
Start: 2022-11-23 | End: 2022-11-24

## 2022-11-23 RX ORDER — OXYCODONE HCL 5 MG/5 ML
5 SOLUTION, ORAL ORAL EVERY 6 HOURS PRN
Status: DISCONTINUED | OUTPATIENT
Start: 2022-11-23 | End: 2022-11-24

## 2022-11-23 RX ORDER — ATORVASTATIN CALCIUM 40 MG/1
40 TABLET, FILM COATED ORAL
Status: DISCONTINUED | OUTPATIENT
Start: 2022-11-23 | End: 2022-11-29

## 2022-11-23 RX ADMIN — GABAPENTIN 100 MG: 100 CAPSULE ORAL at 22:22

## 2022-11-23 RX ADMIN — INSULIN LISPRO 6 UNITS: 100 INJECTION, SOLUTION INTRAVENOUS; SUBCUTANEOUS at 23:46

## 2022-11-23 RX ADMIN — OXYCODONE HYDROCHLORIDE 5 MG: 5 SOLUTION ORAL at 05:49

## 2022-11-23 RX ADMIN — INSULIN HUMAN 10 UNITS: 100 INJECTION, SOLUTION PARENTERAL at 23:46

## 2022-11-23 RX ADMIN — HEPARIN SODIUM 5000 UNITS: 5000 INJECTION INTRAVENOUS; SUBCUTANEOUS at 22:22

## 2022-11-23 RX ADMIN — HYDROMORPHONE HYDROCHLORIDE 0.3 MG: 1 INJECTION, SOLUTION INTRAMUSCULAR; INTRAVENOUS; SUBCUTANEOUS at 19:03

## 2022-11-23 RX ADMIN — INSULIN LISPRO 6 UNITS: 100 INJECTION, SOLUTION INTRAVENOUS; SUBCUTANEOUS at 17:47

## 2022-11-23 RX ADMIN — FOLIC ACID 1 MG: 1 TABLET ORAL at 08:48

## 2022-11-23 RX ADMIN — OXYCODONE HYDROCHLORIDE 5 MG: 5 SOLUTION ORAL at 17:46

## 2022-11-23 RX ADMIN — INSULIN LISPRO 6 UNITS: 100 INJECTION, SOLUTION INTRAVENOUS; SUBCUTANEOUS at 12:21

## 2022-11-23 RX ADMIN — THIAMINE HCL TAB 100 MG 100 MG: 100 TAB at 08:48

## 2022-11-23 RX ADMIN — OXYCODONE HYDROCHLORIDE 5 MG: 5 SOLUTION ORAL at 12:10

## 2022-11-23 RX ADMIN — INSULIN LISPRO 6 UNITS: 100 INJECTION, SOLUTION INTRAVENOUS; SUBCUTANEOUS at 05:08

## 2022-11-23 RX ADMIN — ACETAMINOPHEN 650 MG: 325 TABLET ORAL at 12:10

## 2022-11-23 RX ADMIN — INSULIN HUMAN 10 UNITS: 100 INJECTION, SOLUTION PARENTERAL at 17:47

## 2022-11-23 RX ADMIN — CHLORHEXIDINE GLUCONATE 15 ML: 1.2 SOLUTION ORAL at 22:22

## 2022-11-23 RX ADMIN — HEPARIN SODIUM 5000 UNITS: 5000 INJECTION INTRAVENOUS; SUBCUTANEOUS at 05:08

## 2022-11-23 RX ADMIN — CHLORHEXIDINE GLUCONATE 15 ML: 1.2 SOLUTION ORAL at 08:48

## 2022-11-23 RX ADMIN — CYANOCOBALAMIN TAB 500 MCG 500 MCG: 500 TAB at 08:48

## 2022-11-23 RX ADMIN — OXYCODONE HYDROCHLORIDE 5 MG: 5 SOLUTION ORAL at 12:20

## 2022-11-23 RX ADMIN — ATORVASTATIN CALCIUM 40 MG: 40 TABLET, FILM COATED ORAL at 17:46

## 2022-11-23 RX ADMIN — ACETAMINOPHEN 650 MG: 325 TABLET ORAL at 17:46

## 2022-11-23 RX ADMIN — ERTAPENEM SODIUM 1000 MG: 1 INJECTION, POWDER, LYOPHILIZED, FOR SOLUTION INTRAMUSCULAR; INTRAVENOUS at 18:33

## 2022-11-23 RX ADMIN — ACETAMINOPHEN 650 MG: 325 TABLET ORAL at 23:32

## 2022-11-23 RX ADMIN — OXYCODONE HYDROCHLORIDE 10 MG: 5 SOLUTION ORAL at 23:31

## 2022-11-23 RX ADMIN — Medication 20 MG: at 08:48

## 2022-11-23 RX ADMIN — HEPARIN SODIUM 5000 UNITS: 5000 INJECTION INTRAVENOUS; SUBCUTANEOUS at 15:01

## 2022-11-23 RX ADMIN — HYDROMORPHONE HYDROCHLORIDE 0.3 MG: 1 INJECTION, SOLUTION INTRAMUSCULAR; INTRAVENOUS; SUBCUTANEOUS at 05:52

## 2022-11-23 NOTE — CASE MANAGEMENT
Case Management Discharge Planning Note    Patient name Dwayne Hodges  Location [address] 285-31 MRN [id number]  : 1965 Date 2022       Current Admission Date: 2022  Current Admission Diagnosis:Colon cancer metastasized to liver [facility])   Patient Active Problem List    Diagnosis Date Noted   • Flash pulmonary edema ([address] ) 2022   • MR (mitral regurgitation) 2022   • Bacteremia 2022   • ESBL (extended spectrum beta-lactamase) producing bacteria infection 2022   • Acute respiratory failure with hypoxia ([facility] [address] ) 2022   • Encephalopathy 2022   • Cervical radiculopathy 10/26/2022   • Other fatigue 06/15/2022   • Colostomy prolapse ([facility] [address] ) 2022   • Colon cancer metastasized to liver ([facility] [address] ) 2022   • 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 liver ([facility] [address] ) 2022   • Iron deficiency anemia, unspecified 2022   • Malignant neoplasm of transverse colon ([facility] [address] ) 2022   • Thrombocytosis 2022   • Hypokalemia 2022   • Transaminitis 2022   • Type 2 diabetes mellitus with hyperlipidemia ([facility] [address] ) 2022   • Colonic mass 2022   • Microcytic anemia 2022   • Left ureteral calculus 2020   • Incarcerated umbilical hernia    • Testicular hypogonadism 2017   • Low testosterone 2017   • Type 2 diabetes mellitus without complication, with long-term current use of insulin ([facility] [address] ) 2016   • Benign essential hypertension 2016   • Mixed hyperlipidemia 2016   • Erectile dysfunction 2016   • Obesity (BMI 30-39 9) 2016      LOS (days): 16  Geometric Mean LOS (GMLOS) (days): 9 80  Days to GMLOS:-6 4     OBJECTIVE:  Risk of Unplanned Readmission Score: 34 63         Current admission status: Inpatient   Preferred Pharmacy:   [facility]/pharmacy #[id number] - [place], [place] - [address] [place] [address]  Phone: [phone] Fax: [address]  [address]  [place] [address]  [address] [address]  Phone: [phone] Fax: [phone]    [id number] [place] - [address] [facility] [address]  Phone: [phone] Fax: [phone]    Primary Care Provider: Zan Arenas MD    Primary Insurance: CHERELLE  Secondary Insurance:     DISCHARGE DETAILS:       Other Referral/Resources/Interventions Provided:  Referral Comments: Patient recommended for PT/OT  Caitlyn Saleem was not willing to consider therapy at this time due to feeling frustrated, nor did he want his wife to be notified to discuss STR options  CM to follow up at a later time

## 2022-11-23 NOTE — PLAN OF CARE
Problem: OCCUPATIONAL THERAPY ADULT  Goal: Performs self-care activities at highest level of function for planned discharge setting  See evaluation for individualized goals  Description: Treatment Interventions: ADL retraining, Functional transfer training, UE strengthening/ROM, Endurance training, Cognitive reorientation, Patient/family training, Continued evaluation, Energy conservation, Activityengagement, Equipment evaluation/education        See flowsheet documentation for full assessment, interventions and recommendations  Note: Limitation: Decreased ADL status, Decreased UE ROM, Decreased UE strength, Decreased Safe judgement during ADL, Decreased cognition, Decreased endurance, Decreased self-care trans, Decreased high-level ADLs  Prognosis: Fair  Assessment: Pt seen for OT re-evaluation due to restoration of intestinal continuity, formation of loop ileostomy, and abdominal closure on 11/17/22  Pt extubated yesterday (11/22/22), on 2L O2 NC  For home set-up living information + PLOF please see IE  Current level of function + assistance required is as follows: unable to assess eating (NG tube), MIN A for grooming, MOD A for UB bathing/dressing, MAX A for LB bathing/dressing, Total A for toileting assistance, MOD A for functional assistance, and MAX A x2 for bed mobility  Pt required min-mod A x1 w/trunk support for EOB sitting balance  Unable to assess transitions + fnxl mobility 2'overall decreased strength and weakness  Limitations include decreased ADL status, decreased UE ROM/strength, decreased safe judgment during ADLs, decreased cog, decreased endurance, decreased self-care trans, and decreased high-level ADLs  The patient's raw score on the AM-PAC Daily Activity inpatient short form is 10, standardized score is 33 39, less than 39 4  Patients at this level are likely to benefit from discharge to post-acute rehabilitation services   Please refer to the recommendation of the Occupational Therapist for safe discharge planning  Pt demonstrates decreased safety awareness and insight into condition + not within baseline functioning  Therefore, pt will benefit from STR upon d/c  Presently, recommendation is for pt to receive rehab services 2-4x/wk for 10-14 days to meet goals       OT Discharge Recommendation: Post acute rehabilitation services

## 2022-11-23 NOTE — PROGRESS NOTES
Progress Note - Neeraj Hair 61 y o  male MRN: [id number]    Unit/Bed#: [facility] 004-45 Encounter: [id number]      CC: diabetes f/u    Subjective: This is a 61y o -year-old male with past medical history of type 2 diabetes mellitus, hypertension, hyperlipidemia, GERD, iron deficiency anemia , 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 who presented to the hospital for ex lap, transverse colon resection, segment 3 liver resection, ablation, reversal of colostomy on 11/07, hospital course complicated with left upper quadrant hematoma and ESBL bacteremia   Endocrinology consulted for the management of type 2 diabetes mellitus in the setting of liver metastases  Currently pt on tube feeds    Objective:     Vitals: Blood pressure 147/67, pulse 96, temperature 100 °F (37 8 °C), resp  rate 21, height 5' 8" (1 727 m), weight 118 kg (259 lb 7 7 oz), SpO2 95 %  ,Body mass index is 39 45 kg/m²        Intake/Output Summary (Last 24 hours) at 11/23/2022 1501  Last data filed at 11/23/2022 1200  Gross per 24 hour   Intake 1364 ml   Output 2790 ml   Net -1426 ml       Physical Exam:  General Appearance: awake, appears stated age and cooperative  Head: Normocephalic, without obvious abnormality, atraumatic  Extremities: moves all extremities  Skin: Skin color and temperature normal    Pulm: no labored breathing      POC Glucose (mg/dl)   Date Value   11/23/2022 252 (H)   11/23/2022 274 (H)   11/22/2022 215 (H)   11/22/2022 197 (H)   11/22/2022 203 (H)   11/22/2022 185 (H)   11/21/2022 196 (H)   11/21/2022 184 (H)   11/21/2022 197 (H)   11/21/2022 167 (H)       Assessment and plan:     Type 2 diabetes mellitus with hyperglycemia  HbA1c 8 0 September 2022  Home regimen:  Januvia 100 mg daily, metformin glyburide 5-500 mg QD, CGM [place]  Inpatient regimen:   Lantus 10 units at bedtime, correctional scale algo with meals and at bedtime     Recommendations:  Currently pt is on  lantus 20 units at bedtime and correctional scale  Tube feeds nutrition at goal 65 cc per hour, now developed episodes of hyperglycemia  Recommend to discontinue Lantus and to start regular insulin 10 units every 6 hours today  Continue with accu checks  Portions of the record may have been created with voice recognition software

## 2022-11-23 NOTE — PROGRESS NOTES
Progress note - Palliative and Supportive Care   Severo Herr 61 y o  male [id number]    Patient Active Problem List   Diagnosis   • Type 2 diabetes mellitus without complication, with long-term current use of insulin (HCC)   • Benign essential hypertension   • Mixed hyperlipidemia   • Erectile dysfunction   • Low testosterone   • Obesity (BMI 30-39  9)   • Testicular hypogonadism   • Incarcerated umbilical hernia   • Left ureteral calculus   • Type 2 diabetes mellitus with hyperlipidemia (HCC)   • Colonic mass   • Microcytic anemia   • Hypokalemia   • Transaminitis   • Thrombocytosis   • Iron deficiency anemia, unspecified   • Malignant neoplasm of transverse colon (HCC)   • 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 liver [facility])   • Colon cancer metastasized to liver [facility])   • Colostomy prolapse (HCC)   • Other fatigue   • Cervical radiculopathy   • Encephalopathy   • Flash pulmonary edema (HCC)   • MR (mitral regurgitation)   • Bacteremia   • ESBL (extended spectrum beta-lactamase) producing bacteria infection   • Acute respiratory failure with hypoxia [facility])     Active issues specifically addressed today include:   Metastatic colon cancer status post colectomy and partial hepatectomy on November 7th, followed by anastomotic leak status post ex lap and right hemicolectomy on November 16th and Loop ileostomy on November 17th  Cancer associated pain  Palliative care patient    Plan:  1  Symptom management -    -maintain oxycodone 5 mg q 6 hours around the clock add hold parameters to order   -consolidate p r n  orders to oxycodone 5 mg q  6 hours p r n  for moderate pain and link with oxycodone 10 mg q 6 hours p r n  for severe pain  Maintain IV Dilaudid as is for breakthrough pain    -at around the clock Tylenol 650 mg q 6 hours   -will continue to work to optimize multimodal pain regimen to decrease opioid use overall      2  Goals - full cares    -    3  Psychosocial support- support provided   -    4   [facility] follow-up- will continue to closely follow       Code Status:  Full - Level 1       Interval history:       Patient successfully extubated  He tells me that his pain is controlled but becomes very somnolent during our conversation  Discussed with bedside RN and will review orders to consolidate further and were to optimize not opioid therapies      MEDICATIONS / ALLERGIES:     all current active meds have been reviewed and current meds:   Current Facility-Administered Medications   Medication Dose Route Frequency   • acetaminophen (TYLENOL) tablet 650 mg  650 mg Oral Q6H [address]   • atorvastatin (LIPITOR) tablet 40 mg  40 mg Per NG Tube After Dinner   • chlorhexidine (PERIDEX) 0 12 % oral rinse 15 mL  15 mL Mouth/Throat Q12H [address]   • [START ON 11/24/2022] cyanocobalamin (VITAMIN B-12) tablet 500 mcg  500 mcg Per NG Tube Daily   • ertapenem (INVanz) 1,000 mg in sodium chloride 0 9 % 50 mL IVPB  1,000 mg Intravenous Q24H   • fentanyl citrate (PF) 100 MCG/2ML 50 mcg  50 mcg Intravenous S7B PRN   • folic acid (FOLVITE) tablet 1 mg  1 mg Per NG Tube Daily   • heparin (porcine) subcutaneous injection 5,000 Units  5,000 Units Subcutaneous Q8H [address]   • HYDROmorphone (DILAUDID) injection 0 3 mg  0 3 mg Intravenous Q3H PRN   • insulin glargine (LANTUS) subcutaneous injection 20 Units 0 2 mL  20 Units Subcutaneous HS   • insulin lispro (HumaLOG) 100 units/mL subcutaneous injection 2-12 Units  2-12 Units Subcutaneous Q6H [address]   • naloxone (NARCAN) 0 04 mg/mL syringe 0 04 mg  0 04 mg Intravenous Q1MIN PRN   • [START ON 11/24/2022] omeprazole (PRILOSEC) suspension 2 mg/mL  20 mg Per NG Tube Daily Before Breakfast   • ondansetron (ZOFRAN) injection 4 mg  4 mg Intravenous Q4H PRN   • oxyCODONE (ROXICODONE) oral solution 5 mg  5 mg Oral Q6H PRN    Or   • oxyCODONE (ROXICODONE) oral solution 10 mg  10 mg Oral Q4H PRN   • oxyCODONE (ROXICODONE) oral solution 5 mg  5 mg Per NG Tube Q6H   • thiamine tablet 100 mg  100 mg Per NG Tube Daily Allergies   Allergen Reactions   • Shellfish-Derived Products - Food Allergy Anaphylaxis   • Erythromycin GI Intolerance       OBJECTIVE:    Physical Exam  Physical Exam  Vitals and nursing note reviewed  Constitutional:       General: He is not in acute distress  Appearance: He is obese  He is ill-appearing  HENT:      Head: Normocephalic and atraumatic  Right Ear: External ear normal       Left Ear: External ear normal       Nose: Nose normal    Eyes:      General: No scleral icterus  Right eye: No discharge  Left eye: No discharge  Extraocular Movements: EOM normal    Cardiovascular:      Rate and Rhythm: Regular rhythm  Tachycardia present  Pulses: Intact distal pulses  Pulmonary:      Effort: Pulmonary effort is normal  No respiratory distress  Breath sounds: Normal breath sounds  Abdominal:      General: Bowel sounds are normal  There is no distension  Palpations: Abdomen is soft  Tenderness: There is abdominal tenderness  Musculoskeletal:         General: No edema  Skin:     General: Skin is warm and dry  Coloration: Skin is pale  Neurological:      Mental Status: He is alert  Comments: Alert and oriented, easily fatigues   Psychiatric:         Mood and Affect: Mood and affect normal            Lab Results:   I have personally reviewed pertinent labs  , CBC:   Lab Results   Component Value Date    WBC 12 78 (H) 11/23/2022    HGB 8 5 (L) 11/23/2022    HCT 28 4 (L) 11/23/2022    MCV 96 11/23/2022     (H) 11/23/2022    MCH 28 7 11/23/2022    MCHC 29 9 (L) 11/23/2022    RDW 16 3 (H) 11/23/2022    MPV 10 8 11/23/2022    NRBC 0 11/23/2022   , CMP:   Lab Results   Component Value Date    SODIUM 149 (H) 11/23/2022    K 4 5 11/23/2022     (H) 11/23/2022    CO2 23 11/23/2022    BUN 42 (H) 11/23/2022    CREATININE 1 59 (H) 11/23/2022    CALCIUM 8 0 (L) 11/23/2022    EGFR 47 11/23/2022   , PT/PTT:No results found for: PT, PTT  Imaging Studies:  Reviewed  EKG, Pathology, and Other Studies:  Reviewed    Counseling / Coordination of Care    Total floor / unit time spent today 25+ minutes  Greater than 50% of total time was spent with the patient and / or family counseling and / or coordination of care  A description of the counseling / coordination of care:  Chart review, medication review, medication adjustments, supportive listening, discussion with bedside RN    Portions of this document may have been created using dictation software and as such some "sound alike" terms may have been generated by the system  Do not hesitate to contact me with any questions or clarifications

## 2022-11-23 NOTE — PLAN OF CARE
Problem: PHYSICAL THERAPY ADULT  Goal: Performs mobility at highest level of function for planned discharge setting  See evaluation for individualized goals  Description: Treatment/Interventions: Functional transfer training, LE strengthening/ROM, Therapeutic exercise, Endurance training, Patient/family training, Equipment eval/education, Bed mobility, Gait training, Cognitive reorientation, Spoke to nursing          See flowsheet documentation for full assessment, interventions and recommendations  Note: Prognosis: Fair  Problem List: Decreased strength, Decreased range of motion, Decreased endurance, Impaired balance, Decreased mobility, Decreased cognition, Impaired judgement, Decreased safety awareness, Decreased coordination, Pain  Assessment: Pt seen for PT re-evaluation s/p ex-lap with bowel resection and wound vac placement  Pt is s/p extubation  Pt is a 61 y o  M who initially presented to [facility] with colon cancer with mets to liver on 11/7/22  Pt presents with decreased strength, balance, endurance that contribute to limitations in bed mobility, functional transfers, functional mobility  Pt requires Max A x 2 for supine<>sit at this time  Pt left supine in bed with bed alarm intact with all needs in reach  Pt will benefit from skilled therapy in order to address current impairments and functional limitations  PT to follow pt and recommending rehab once medically cleared  The patient's AM-PAC Basic Mobility Inpatient Short Form Raw Score is 6  A Raw score of less than or equal to 16 suggests the patient may benefit from discharge to post-acute rehabilitation services  Please also refer to the recommendation of the Physical Therapist for safe discharge planning  Barriers to Discharge: Inaccessible home environment, Decreased caregiver support     PT Discharge Recommendation: Post acute rehabilitation services    See flowsheet documentation for full assessment

## 2022-11-23 NOTE — PROGRESS NOTES
Progress Note - Surgical Oncology   Marta Ahumada 61 y o  male MRN: [id number]  Unit/Bed#: [facility] 517-01 Encounter: [id number]    Assessment:  62 y/o M w metastatic colon ca s/p transverse colectomy & partial hepatectomy 11/7/22 c/b anastomotic leak, ex lap, R hemicolectomy 11/16/22 and loop ileostomy 11/17       Tmax: 100 6 last 24 h  2 L NC  Urine 2 3 L  Ileostomy: functional 325cc stool/ 24h  Uop: 100 cc /h  2 4L last 24 h    R VICTORINO: none  L VICTORINO 90 from 40 cc serosang    WBC 12 from 11  Hgb 8 5 from 7 2  Cr 1 59 from 1 51      Plan:  Monitor on nasal cannula  Continue tube feeds to goal  Continue iv abx, ertapenem until 11/24, appreciate ID recs  Monitor fever curve  Maintain wound vac, changes MWF  Maintain VICTORINO drains  dvt ppx SQH  ICU diamox bid       Subjective/Objective     Subjective:   No acute events overnight  Extubated  On bipap now on NC  No nausea or vomiting  Objective:     Blood pressure 141/68, pulse 96, temperature 100 2 °F (37 9 °C), resp  rate 20, height 5' 8" (1 727 m), weight 118 kg (259 lb 7 7 oz), SpO2 95 %  ,Body mass index is 39 45 kg/m²        Intake/Output Summary (Last 24 hours) at 11/23/2022 0622  Last data filed at 11/23/2022 0600  Gross per 24 hour   Intake 1457 ml   Output 2790 ml   Net -1333 ml       Invasive Devices     Central Venous Catheter Line  Duration           Port A Cath 03/10/22 Right Chest 257 days          Peripheral Intravenous Line  Duration           Peripheral IV 11/20/22 Right;Ventral (anterior) Forearm 3 days    Peripheral IV 11/21/22 Left;Upper;Ventral (anterior) Arm 1 day    Peripheral IV 11/23/22 Right Antecubital <1 day          Drain  Duration           Urethral Catheter 16 Fr  7 days    Closed/Suction Drain Lateral;Right Abdomen 6 days    Closed/Suction Drain Left Abdomen Bulb 19 Fr  6 days    NG/OG/Enteral Tube Nasogastric 6 days    Ileostomy LUQ 5 days                Physical Exam:   Gen: NAD, Comfortable  Neuro: A&O, No focal deficits  Head: Normal Cephalic, Atraumatic  Eye: EOMI, PERRLA, No scleral icterus  Neck: Supple, No JVD, Midline trachea  CV: RRR, Cap refill <2 sec  Pulm: Normal work of breathing, no respiratory distress  Abd: Soft, Non-Distended, Non-Tender, drain see above  Ext: No edema, Non-tender  Skin: warm, dry, intact

## 2022-11-23 NOTE — PROGRESS NOTES
Daily Progress Note - Critical Care   Tammy Burnett 61 y o  male MRN: [id number]  Unit/Bed#: [facility] 517-01 Encounter: [id number]        ----------------------------------------------------------------------------------------  HPI/24hr events: 62 yo male with a PMH of metastatic colon cancer, s/p ex lpa, transverse colon resection, reversal of loop colostomy, peritoneal biopsy, and segment 3 liver resection on 11/7/2022  Upgraded to critical care service 2/2 to acute encephalopathy, tachycardia, and tachypnea  Intubated on 11/09, extubated 11/11, 11/16 intubated for OR right hemicolectomy with abthera vac  11/17 OR for diverting ileostomy  The patient was extubated yesterday to [facility] 14/8  He was diuresed with Diamox IV 500mg BID and was -1 3L for the last 24 hours  He was weaned down to nasal cannula over the course of the night  Wound vac in place with minimal output overnight  Patient remains hypernatremic; will continue free water flushes today  This morning the patient is HD stable, on 2L NC in NAD      ---------------------------------------------------------------------------------------  SUBJECTIVE  The patient reports adequate pain control this morning  Asks to have a drink of water  Review of Systems   Respiratory: Negative for chest tightness and shortness of breath  Cardiovascular: Negative for chest pain and palpitations  Gastrointestinal: Positive for abdominal pain (on palpation)  Neurological: Negative for light-headedness  BLE neuropathy - chronic per patient   All other systems reviewed and are negative  Review of systems was reviewed and negative unless stated above in HPI/24-hour events   ---------------------------------------------------------------------------------------  Assessment and Plan:    • Acute post operative pain/sedation  ?  Plan:   - Continue pain regmimen: oxy 5mg tony, oxy 10mg PRN, 0 3mg IV dilaudid  - Consider consulting APS if pain persists despite current regimen     • Acute metabolic encephalopathy (resolved)  ? Plan:   - Frequent neuro checks       ICU delirium prevention: frequent reorientation, promote sleep hygiene        CV:   • Hypotension  ? Plan:   - Off pressors  - Goal goal MAP >65  - Continuous tele     • Paroxysmal atrial tachycardia (resolved)  ? Plan:   - Continuous cardiac monitoring     • Hypertension (chronic)  ? Plan:   - Holding home amlodipine and lisinopril     • Hyperlipidemia (chronic)  ? Plan:   - Continue atorvastatin         Pulm:  • Acute hypoxic ventilatory dependent respiratory failure (resolved)  • Extubated on 11/22 to Bipap       • Pleural effusion  • 11/21 CXR: evidence of edema and pleural effusions  • 11/22: diuresed with diamox 500mg IV BID: -1 3L/24hrs  ? Plan:  - Continue to monitor respiratory status  - Consider bipap at night        GI:   • Metastatic colon cancer  ? S/p ex lap, transverse colon resection, reversal of loop colostomy, peritoneal biopsy and segment 3 liver resection on 11/7  ? 11/16: Anastomotic leak s/p ex lap, R hemicolectomy  ? 11/17: S/p diverting loop ileostomy  ? Plan:   - Monitor drain/VAC output  - Serial abdominal exams  - Surgery following, appreciate recs     Bowel regimen:   - Colostomy in place with bag collection appliance  - Plan: continue to monitor ostomy output; consider initiating bowel regiment if output decreases       :   • ALEXY on stage 3 CKD  ? Plan:   - Improving; sCr plateauing; patient adequately urinating with response to diuretic therapy  - Strict I&Os  - Monitor sCr/BUN with daily labs        F/E/N:   • Plan:  ? Delfina Poncho  ? E - monitor and replete as needed, goal Mag >2, Phos >3, K >4  ? N - TF Vital @65cc/hr (at goal)  ? FW deficit calculated at 2L  ? Plan: consider increasing FW to 275cc q4        Heme/Onc:   • Acute blood loss anemia  ?  Plan:   - Stable hgb this AM  - Monitor for s/s bleeding, non currently  - Transfuse for hgb <7 0       • Diagnosis: DVT ppx  ? Plan:   - Continue heparin SQ        Endo:   • T2DM with hyperglycemia  ? Plan:   - SSI algorithm 4  - Increase Lantus to 20U at bedtime  - Goal -180        ID:   • Severe sepsis, ESBL bacteremia  ? Plan:   - Repeat bcx 11/17 with no growth at 48 hours  - On ertapenem; will continue until 11/24 per ID recs  - 100 2F this morning; slight increase in WBC  - ID following appreciate ongoing recs        MSK/Skin:   • Surgical sites  ? Plan:   - Surgical site care as per surgery  - Wound vac site changed MWF by surgery  - PT/OT when able  - Monitor VICTORINO drain output; do not flush drains per surgical team  - Frequent turns/reposition       Patient appropriate for transfer out of the ICU today?: No  Disposition: Continue Critical Care   Code Status: Level 1 - Full Code  ---------------------------------------------------------------------------------------  ICU CORE MEASURES    Prophylaxis   VTE Pharmacologic Prophylaxis: Heparin  VTE Mechanical Prophylaxis: sequential compression device  Stress Ulcer Prophylaxis: Omeprazole PO      CAM-ICU: Negative    Invasive Devices Review  Invasive Devices     Central Venous Catheter Line  Duration           Port A Cath 03/10/22 Right Chest 257 days          Peripheral Intravenous Line  Duration           Peripheral IV 11/20/22 Right;Ventral (anterior) Forearm 3 days    Peripheral IV 11/21/22 Left;Upper;Ventral (anterior) Arm 1 day    Peripheral IV 11/23/22 Right Antecubital <1 day          Drain  Duration           Urethral Catheter 16 Fr  7 days    Closed/Suction Drain Lateral;Right Abdomen 6 days    Closed/Suction Drain Left Abdomen Bulb 19 Fr  6 days    NG/OG/Enteral Tube Nasogastric 6 days    Ileostomy LUQ 5 days              Can any invasive devices be discontinued today?  No  ---------------------------------------------------------------------------------------  OBJECTIVE    Vitals   Vitals:    11/23/22 0200 11/23/22 0300 11/23/22 0400 11/23/22 0620   BP: 139/65 134/66 145/68 141/68   Pulse: 93 96 95 96   Resp: 17 18 20 20   Temp: 99 5 °F (37 5 °C) 99 7 °F (37 6 °C) 99 7 °F (37 6 °C) 100 2 °F (37 9 °C)   TempSrc:       SpO2: 96% 95% 95% 95%   Weight:       Height:         Temp (24hrs), Av 8 °F (37 7 °C), Min:99 3 °F (37 4 °C), Max:100 6 °F (38 1 °C)  Current: Temperature: 100 2 °F (37 9 °C)  HR: 96  BP: 140/75  RR: 16  SpO2: 95% on 2L NC    Respiratory:  SpO2 Device: O2 Device: Nasal cannula  Nasal Cannula O2 Flow Rate (L/min): 4 L/min    Invasive/non-invasive ventilation settings   Respiratory    Lab Data (Last 4 hours)    None         O2/Vent Data (Last 4 hours)    None                Physical Exam  Vitals reviewed  Constitutional:       General: He is not in acute distress  Appearance: He is ill-appearing  He is not diaphoretic  HENT:      Head: Normocephalic  Mouth/Throat:      Mouth: Mucous membranes are dry  Eyes:      General: Scleral icterus present  Pupils: Pupils are equal, round, and reactive to light  Cardiovascular:      Rate and Rhythm: Normal rate and regular rhythm  Pulses: Normal pulses  Heart sounds: No murmur heard  No friction rub  No gallop  Pulmonary:      Effort: Pulmonary effort is normal       Breath sounds: Normal breath sounds  No wheezing, rhonchi or rales  Abdominal:      General: Bowel sounds are normal  There is distension  Tenderness: There is abdominal tenderness  There is no rebound  Comments: Colostomy with bag appliance in tact with brown loose stool output  2x VICTORINO drains in place: left drain with bloody crusting in tube  Right VICTORINO drain with small bloody output  Midline abdominal incision with wound vac to continuous suction -125mmHg; clean, dry, intact  Musculoskeletal:      Cervical back: No tenderness  Right lower leg: Edema present  Left lower leg: Edema present  Lymphadenopathy:      Cervical: No cervical adenopathy  Skin:     General: Skin is dry        Capillary Refill: Capillary refill takes less than 2 seconds  Coloration: Skin is pale  Findings: Bruising present  Neurological:      Comments: Disoriented to date, situation  Psychiatric:      Comments: Semi flat affect but conversative  Laboratory and Diagnostics:  Results from last 7 days   Lab Units 11/23/22  0531 11/22/22  0606 11/21/22  0454 11/20/22  0602 11/19/22  1337 11/19/22  0500 11/19/22  0041 11/18/22  2241 11/18/22  0447 11/16/22  2328 11/16/22  1546   WBC Thousand/uL 12 78* 10 92* 9 75 11 32* 14 21* 17 72*  --  17 07* 27 12*   < > 25 84*   HEMOGLOBIN g/dL 8 5* 7 2* 7 6* 7 7* 7 0* 7 1* 6 3* 6 4* 8 1*   < > 10 7*   HEMATOCRIT % 28 4* 24 1* 24 6* 24 0* 20 9* 22 0*  --  19 7* 24 9*   < > 33 9*   PLATELETS Thousands/uL 532* 478* 498* 467* 402* 386  --  384 410*   < > 454*   NEUTROS PCT % 82* 79* 76* 85* 88* 87*  --   --  87*   < >  --    BANDS PCT %  --   --   --   --   --   --   --   --   --   --  9*   MONOS PCT % 8 11 12 9 7 8  --   --  8   < >  --    MONO PCT %  --   --   --   --   --   --   --   --   --   --  0*    < > = values in this interval not displayed       Results from last 7 days   Lab Units 11/23/22  0531 11/22/22  1755 11/22/22  0606 11/21/22  0454 11/20/22  0602 11/19/22  0500 11/19/22  0041 11/18/22 2122 11/18/22  0447 11/17/22  1603 11/17/22  0423 11/16/22  2328 11/16/22  1546   SODIUM mmol/L 149* 149* 149* 147 147 143 142   < > 141   < > 140   < > 143   POTASSIUM mmol/L 4 5 3 9 4 1 3 6 3 7 3 8 4 2   < > 4 8   < > 4 8   < > 4 2   CHLORIDE mmol/L 122* 121* 120* 118* 115* 110* 110*   < > 109*   < > 111*   < > 113*   CO2 mmol/L 23 22 25 24 24 22 23   < > 21   < > 23   < > 22   ANION GAP mmol/L 4 6 4 5 8 11 9   < > 11   < > 6   < > 8   BUN mg/dL 42* 42* 47* 55* 65* 56* 71*   < > 63*   < > 44*   < > 31*   CREATININE mg/dL 1 59* 1 51* 1 61* 1 83* 2 34* 2 93* 3 14*   < > 2 91*   < > 2 17*   < > 1 33*   CALCIUM mg/dL 8 0* 7 6* 7 8* 8 0* 7 7* 8 2* 8 4   < > 7 9*   < > 7 7*   < > 7 5*   GLUCOSE RANDOM mg/dL 243* 207* 207* 174* 153* 159* 166*   < > 175*   < > 220*   < > 177*   ALT U/L  --   --   --   --   --  12  --   --  13  --    --     AST U/L  --   --   --   --   --  58*  --   --  53*  --  38  --  40   ALK PHOS U/L  --   --   --   --   --  141*  --   --  163*  --  158*  --  220*   ALBUMIN g/dL  --   --   --   --   --  1 9*  --   --  1 6*  --  2 0*  --  1 4*   TOTAL BILIRUBIN mg/dL  --   --   --   --   --  3 33*  --   --  2 85*  --  2 20*  --  1 53*    < > = values in this interval not displayed  Results from last 7 days   Lab Units 22  0531 22  0606 22  0454 22  0602 22  0500 222 22  0447   MAGNESIUM mg/dL 2 6 2 7* 3 0* 2 9* 2 9* 2 9* 2 6   PHOSPHORUS mg/dL 3 8 3 3 3 8 5 5* 8 3* 8 1* 8 7*               Results from last 7 days   Lab Units 22  1603 22  2328 22  1546   LACTIC ACID mmol/L 1 3 1 5 1 6 1 4     ABG:  Results from last 7 days   Lab Units 22   PH ART  7 424   PCO2 ART mm Hg 34 6*   PO2 ART mm Hg 133 7*   HCO3 ART mmol/L 22 1   BASE EXC ART mmol/L -2 0   ABG SOURCE  Radial, Left     VBG:  Results from last 7 days   Lab Units 22   ABG SOURCE  Radial, Left           Micro  Results from last 7 days   Lab Units 22  0016 22  1315 22  1314   BLOOD CULTURE  No Growth After 5 Days  No Growth After 5 Days  --   --    GRAM STAIN RESULT   --  No Polys or Bacteria seen No Polys or Bacteria seen       Imagin/21 CT pe study with abdomen pelvis w contrast:  No evidence of pulmonary embolus    Moderate right and small left pleural effusions with associated dependent lower lobe atelectasis    Interval evacuation of previously identified intra-abdominal hematoma and previously noted extraluminal collection with placement of drainage catheters  No evidence of residual organized collection identified    Small volume of ascites present         CXR:  Evidence of edema and pleural effusions  Low lung volumes with atelectasis  Support tubes and lines as above  I have personally reviewed pertinent reports  and I have personally reviewed pertinent films in PACS    Intake and Output  I/O       11/21 0701 11/22 0700 11/22 0701 11/23 0700    P  O  0 0    I V  (mL/kg) 455 1 (3 9) 132 (1 1)    NG/GT 1235 460    IV Piggyback 250 50    Feedings 398 815    Total Intake(mL/kg) 2338 1 (19 8) 1457 (12 3)    Urine (mL/kg/hr) 2540 (0 9) 2350 (0 8)    Emesis/NG output 0 0    Drains 140 115    Stool 475 325    Total Output 3155 2790    Net -816 9 -1333              UOP: 75 ml/hr     Height and Weights   Height: 5' 8" (172 7 cm)  IBW (Ideal Body Weight): 68 4 kg  Body mass index is 39 45 kg/m²  Weight (last 2 days)     None            Nutrition       Diet Orders   (From admission, onward)             Start     Ordered    11/23/22 0230  Diet Enteral/Parenteral; Tube Feeding No Oral Diet; Vital High Protein; Continuous; 65; 100; Water; Every 6 hours  Diet effective now        Comments: Start at 45mL/hr and increase by 10mL/hr q8 hours as tolerated to goal of 65mL/hr  References:    Nutrtion Support Algorithm Enteral vs  Parenteral   Question Answer Comment   Diet Type Enteral/Parenteral    Enteral/Parenteral Tube Feeding No Oral Diet    Tube Feeding Formula: Vital High Protein    Bolus/Cyclic/Continuous Continuous    Tube Feeding Goal Rate (mL/hr): 65    Tube Feeding flush (mL): 100    Water Flush type: Water    Water flush frequency: Every 6 hours    RD to adjust diet per protocol? No        11/23/22 0229              TF currently running at 55 ml/hr with a goal of 65 ml/hr   Formula: Vital High      Active Medications  Scheduled Meds:  Current Facility-Administered Medications   Medication Dose Route Frequency Provider Last Rate   • acetaminophen  650 mg Oral Q4H PRN Brigitte Orona MD     • atorvastatin  40 mg Oral After [facility], DO     • chlorhexidine  15 mL Mouth/Throat Q12H [facility] GAURANG Diaz     • cyanocobalamin  500 mcg Oral Daily GAURANG Diaz     • ertapenem  1,000 mg Intravenous Q24H Luisito Barreto MD Stopped (11/22/22 2100)   • fentanyl citrate (PF)  50 mcg Intravenous Q1H PRN GAURANG Diaz     • folic acid  1 mg Per NG Tube Daily GAURANG Diaz     • heparin (porcine)  5,000 Units Subcutaneous [facility] GAURANG Diaz     • HYDROmorphone  0 3 mg Intravenous Q3H PRN GAURANG Felix     • insulin glargine  20 Units Subcutaneous HS GAURANG Felix     • insulin lispro  2-12 Units Subcutaneous Q6H [facility] GAURANG Diaz     • naloxone  0 04 mg Intravenous Q1MIN PRN Nora Rodriguez PA-C     • omeprazole (PRILOSEC) suspension 2 mg/mL  20 mg Oral Daily GAURANG Felix     • ondansetron  4 mg Intravenous Q4H PRN GAURANG Diaz     • oxyCODONE  10 mg Oral Q6H PRN GAURANG Arceo     • oxyCODONE  5 mg Oral Q6H Nora Rodriguez PA-C     • sterile water          • thiamine  100 mg Per NG Tube Daily GAURANG Acharya       Continuous Infusions:     PRN Meds:   acetaminophen, 650 mg, Q4H PRN  fentanyl citrate (PF), 50 mcg, Q1H PRN  HYDROmorphone, 0 3 mg, Q3H PRN  naloxone, 0 04 mg, Q1MIN PRN  ondansetron, 4 mg, Q4H PRN  oxyCODONE, 10 mg, Q6H PRN        Allergies   Allergies   Allergen Reactions   • Shellfish-Derived Products - Food Allergy Anaphylaxis   • Erythromycin GI Intolerance     ---------------------------------------------------------------------------------------  Advance Directive and Living Will:      Power of :    POLST:    ---------------------------------------------------------------------------------------  Care Time Delivered:   No Critical Care time spent     GAURANG Moreno      Portions of the record may have been created with voice recognition software    Occasional wrong word or "sound a like" substitutions may have occurred due to the inherent limitations of voice recognition software    Read the chart carefully and recognize, using context, where substitutions have occurred

## 2022-11-23 NOTE — PROGRESS NOTES
Pastoral Care Progress Note    2022  Patient: Cesar Anguiano : 1965  Admission Date & Time: 2022 0515  MRN: [id number] [facility]: [id number]         22 0900   Clinical Encounter Type   Visited With Patient;Patient not available   Routine Visit Follow-up   Continue Visiting Yes   Crisis Visit Critical Care   Hinduism Encounters   Hinduism Needs Prayer      attempted to visit patient and patient's wife, but patient was resting and not alert and the patient's wife was also not present  I will keep following up  Gave referral to on-call  to check and see if the wife may come in later today  I offered silent prayer at the patient' door  No needs expressed at this time  Chaplains still remain available                     Relationship Building: Provided silent and supportive presence    Ritual: Provided prayer

## 2022-11-23 NOTE — SPEECH THERAPY NOTE
Speech-Language Pathology Bedside Swallow Evaluation    Patient Name: Nolvia Chaney    LDKIE'Z Date: 11/23/2022     Problem List  Principal Problem:    Colon cancer metastasized to liver [facility])  Active Problems:    Benign essential hypertension    Type 2 diabetes mellitus with hyperlipidemia (HCC)    Malignant neoplasm of transverse colon (HCC)    Encephalopathy    Flash pulmonary edema (HCC)    MR (mitral regurgitation)    Bacteremia    ESBL (extended spectrum beta-lactamase) producing bacteria infection    Acute respiratory failure with hypoxia [facility])    Past Medical History  Past Medical History:   Diagnosis Date   • Abdominal pain 03/12/2022   • Acute renal failure ([facility] )     [id number] resolved   • Cancer ([facility] )    • Diabetes mellitus ([facility] )    • Enteritis 08/23/2016   • Gastroparesis due to DM ([facility] ) 08/23/2016   • GERD (gastroesophageal reflux disease)    • Hernia, ventral 08/04/2016   • Hyperlipidemia    • Hypertension    • Morbid obesity ([facility] ) 04/17/2018   • Postoperative visit 03/02/2022   • SIRS (systemic inflammatory response syndrome) ([facility] ) 03/12/2022   • Snoring    • Stage 3a chronic kidney disease ([facility] ) 02/19/2022     Past Surgical History  Past Surgical History:   Procedure Laterality Date   • COLONOSCOPY     • COLOSTOMY N/A 02/20/2022    Procedure: COLOSTOMY LOOP, diverting;  Surgeon: Gwen Hua MD;  Location: [facility];  Service: General   • ESOPHAGOGASTRODUODENOSCOPY N/A 08/24/2016    Procedure: ESOPHAGOGASTRODUODENOSCOPY (EGD); Surgeon: Aurora Briones MD;  Location: [facility];   Service:    • EXPLORATORY LAPAROTOMY W/ BOWEL RESECTION N/A 11/16/2022    Procedure: LAPAROTOMY EXPLORATORY W/ BOWEL RESECTION- VAC PLACEMENT;  Surgeon: Michoacano De La Rosa MD;  Location: [facility];  Service: Surgical Oncology   • EXPLORATORY LAPAROTOMY W/ BOWEL RESECTION N/A 11/17/2022    Procedure: LAPAROTOMY EXPLORATORY W/ BOWEL RESECTION;  Surgeon: Michoacano De La Rosa MD;  [facility]: [facility];  Service: Surgical Oncology   • ILEOSTOMY N/A 11/17/2022    Procedure: ILEOSTOMY;  Surgeon: Enrique Michel MD;  [facility];  Service: Surgical Oncology   • ILEOSTOMY CLOSURE N/A 11/7/2022    Procedure: REVERSAL COLOSTOMY;  Surgeon: Jodi Baxter MD;  [facility];  Service: Surgical Oncology   • IR PORT PLACEMENT  03/10/2022   • KIDNEY STONE SURGERY     • LIVER BIOPSY LAPAROSCOPIC N/A 02/20/2022    Procedure: DIAGNOSTIC LAPAROSCOPIC LIVER BIOPSY, DIVERTING LOOP COLOSTOMY ;  Surgeon: Laurinda Lundborg, MD;  [facility]: [facility];  Service: General   • LIVER LOBECTOMY N/A 11/7/2022    Procedure: SEGMENT 3 LIVER RESECTION, ABLATION, INTRAOPERATIVE U/S OF LIVER, PERITONEAL BIOPSY;  Surgeon: Jodi Baxter MD;  [facility]: [facility];  Service: Surgical Oncology   • RIGHT COLON RESECTION N/A 11/7/2022    Procedure: EX LAP, TRANVERSE COLON RESECTION;  Surgeon: Jodi Baxter MD;  [facility];  Service: Surgical Oncology   • TONSILLECTOMY     • UMBILICAL HERNIA REPAIR LAPAROSCOPIC N/A 04/26/2019    Procedure: LAPAROSCOPIC UMBILICAL HERNIA REPAIR;  Surgeon: Laurinda Lundborg, MD;  [facility]: [facility];  Service: General   • VAC DRESSING APPLICATION N/A 56/37/0681    Procedure: APPLICATION VAC DRESSING ABDOMEN/TRUNK;  Surgeon: Enrique Michel MD;  [facility];  Service: Surgical Oncology       Summary  Pt presented with s/s suggestive of mild oropharyngeal dysphagia  Symptoms or concerns included decreased bolus formation and suspected decreased control of liquids, as well as suspected pharyngeal swallow delay and suspected decreased hyolaryngeal elevation upon palpation  Suspect dysphagia is currently exacerbated by lethargy and pain meds  Mild cough noted on occasion with sequential swallows via straw  Single sips with additional cueing for alertness were tolerated without s/s aspiration  Due to recent bowel surgery, only water and jello were offered today  Pt did not want to attempt solid foods       Risk/s for Aspiration: mild    Recommended Diet: puree/level 1 diet and thin liquids   Recommended Form of Meds: whole with liquid   Aspiration precautions and swallowing strategies: upright posture, only feed when fully alert, slow rate of feeding and alternating bites and sips  Other Recommendations: Continue frequent oral care      Current Medical Status per H&P 67/22  50-year-old male with metastatic colon cancer   Based on his current imaging he appears to have resectable disease   His liver volumes are borderline with his left lobe accounting for 24% of his liver   Also of concern is lung lesion   This is decreased in size   While this is not biopsy proven, it is still concerning   This lung lesion, if it is metastatic can be treated with continuing chemotherapy or possibly even SBRT   Since all his disease appears to be in his liver, I would recommend that he undergo reversal of his colostomy at the time of resection of his primary colon tumor   At that time we will address the left-sided liver lesions with a combination of resection/ablation  Lizy Watters will then require portal vein embolization to hypertrophy his left lobe and then in 6 weeks we will consider right lobectomy   The risks of reversal of his colostomy with colon resection, intraoperative ultrasound of the liver and resection/ablation were explained and included bleeding, infection, recurrence, need for further surgery, wound complications, adjacent organ injury, anastomotic leak, sepsis, mi, DVT, stroke, pulmonary embolism, and death  Informed consent was obtained  Pearl Carr will schedule this at our earliest 40 Barbara Law is agreeable to this plan   All his questions were answered  Special Studies:  CXR 11/21/22 IMPRESSION:  Evidence of edema and pleural effusions  Low lung volumes with atelectasis  Support tubes and lines as above  CT chest 11/21/22 LUNGS: Dependent lower lobe atelectasis is present    There is no tracheal or endobronchial lesion  Social/Education/Vocational Hx:  Pt lives with family    Swallow Information   Current Risks for Dysphagia & Aspiration: recent surgery and recent intubation  Current Diet: NPO   Baseline Diet: regular diet and thin liquids      Baseline Assessment   Behavior/Cognition: lethargic, waxing and waning arousal level and decreased attention  Speech/Language Status: able to participate in basic conversation and able to follow commands  Patient Positioning: upright in bed  Pain Status/Interventions/Response to Interventions: No report of or nonverbal indications of pain  Swallow Mechanism Exam  Facial: symmetrical  Labial: decreased strength  Lingual: bilateral decreased strength  Velum: symmetrical  Mandible: adequate ROM  Dentition: adequate  Vocal quality:weak   Volitional Cough: weak   Respiratory Status: on 2L O2    Consistencies Assessed and Performance   Consistencies Administered: thin liquids and jello    Oral Stage: mild, decreased bolus formation and suspected decreased control of liquids     Pharyngeal Stage: mild, suspected pharyngeal swallow delay and suspected decreased hyolaryngeal elevation upon palpation  Cough inconsistently with sequential straw sips of thin  Single sips tolerated without s/s aspiration  Esophageal Concerns: none reported    Summary and Recommendations (see above)    Results Reviewed with: patient and RN     Treatment Recommended: yes     Frequency of treatment: 2-3x/week    Dysphagia LTG  -Patient will demonstrate safe and effective oral intake (without overt s/s significant oral/pharyngeal dysphagia including s/s penetration or aspiration) for the highest appropriate diet level  Short Term Goals:  -Pt will tolerate the least restrictive diet and thin liquid with no significant s/s oral or pharyngeal dysphagia across 1-3 diagnostic sessions

## 2022-11-23 NOTE — PROGRESS NOTES
Progress Note - Infectious Disease   Radha Marshall 61 y o  male MRN: [id number]  Unit/Bed#: [facility] 517-01 Encounter: [id number]      Impression/Recommendations:  1  Sepsis, secondary to intra-abdominal infection and bacteremia   Patient is clinically much improved on antibiotic and status post I&D/bowel resection   Temperature up and down but trending   WBC is much decreased  He is currently not on any pressors  Antibiotic plan as in below  Monitor temperature/WBC  Monitor hemodynamics      2  ESBL producing E coli bacteremia, likely secondary to gut translocation during recent colon surgery   Patient is clinically improved   Bacteremia cleared   Patient completed antibiotic course for this but given probable anastomotic leak, will continue antibiotic for that indication  No further antibiotic needed for this      3  Anastomotic leak up with intra-abdominal abscess/infected hematoma   Patient is status post exploratory laparotomy, with drainage of abscess and cecum resection   Operative culture growing ESBL producing E coli again, with lactobacillus   Patient had repeat ex lap, with healthy-appearing bowel  Repeat CT with resolved intra-abdominal collections  Continue IV ertapenem  Treat x7 days postop, through 11/24      4  Metastatic colon cancer, with liver metastases   Patient is status post transverse colon resection  Surgical oncology follow-up      5  ALEXY, superimposed on CKD   Creatinine worsen postop but improving again  Antibiotic at full dose for now  Monitor creatinine       6  Acute hypoxic respiratory failure, previously on ventilator   Patient was extubated but now intubated again postop   No clinical pneumonia  Monitor respiratory status      7  Encephalopathy, multifactorial   Mental status was improved but now sedated on ventilator   He is responsive and attempts to communicate  on ventilator    Monitor mental status      8  Type 2 DM      Discussed with patient in detail regarding the above plan      Antibiotics:  Ertapenem # 15  POD # 7/6     Subjective:  Patient is extubated, remains in ICU  He is lethargic/sleepy but arousable  Mild abdominal discomfort  Temperature stays  He is tolerating antibiotic well   No nausea, vomiting or diarrhea      Objective:  Vitals:  Temp:  [99 3 °F (37 4 °C)-100 6 °F (38 1 °C)] 99 3 °F (37 4 °C)  HR:  [] 94  Resp:  [15-36] 19  BP: (119-152)/(62-81) 139/71  SpO2:  [94 %-99 %] 94 %  Temp (24hrs), Av 8 °F (37 7 °C), Min:99 3 °F (37 4 °C), Max:100 6 °F (38 1 °C)  Current: Temperature: 99 3 °F (37 4 °C)    Physical Exam:     General: Awake, alert, cooperative, no distress  Neck:  Supple  No mass  No lymphadenopathy  Lungs: Expansion symmetric, no rales, no wheezing, respirations unlabored  Heart:  Regular rate and rhythm, S1 and S2 normal, no murmur  Abdomen: Soft, mild distention, abdominal wound with VAC  No purulence  No erythema/warmth  Mild tenderness  Extremities: 1+ leg edema  No erythema/warmth  No ulcer  Nontender to palpation  Skin:  No rash  Neuro: Moves all extremities  Invasive Devices     Central Venous Catheter Line  Duration           Port A Cath 03/10/22 Right Chest 257 days          Peripheral Intravenous Line  Duration           Peripheral IV 22 Right;Ventral (anterior) Forearm 3 days    Peripheral IV 22 Left;Upper;Ventral (anterior) Arm 1 day    Peripheral IV 22 Right Antecubital <1 day          Drain  Duration           Urethral Catheter 16 Fr  7 days    Closed/Suction Drain Lateral;Right Abdomen 6 days    Closed/Suction Drain Left Abdomen Bulb 19 Fr  6 days    NG/OG/Enteral Tube Nasogastric 6 days    Ileostomy LUQ 5 days                Labs studies:   I have personally reviewed pertinent labs    Results from last 7 days   Lab Units 22  0531 22  1755 22  0606 22  0602 22  0500 22  2122 22  0447 22  1603 22  0423 22  1546 22  1343 POTASSIUM mmol/L 4 5 3 9 4 1   < > 3 8   < > 4 8   < > 4 8   < >  --    CHLORIDE mmol/L 122* 121* 120*   < > 110*   < > 109*   < > 111*   < >  --    CO2 mmol/L 23 22 25   < > 22   < > 21   < > 23   < >  --    CO2, I-STAT mmol/L  --   --   --   --   --   --   --   --   --   --  27   BUN mg/dL 42* 42* 47*   < > 56*   < > 63*   < > 44*   < >  --    CREATININE mg/dL 1 59* 1 51* 1 61*   < > 2 93*   < > 2 91*   < > 2 17*   < >  --    EGFR ml/min/1 73sq m 47 50 46   < > 22   < > 22   < > 32   < >  --    GLUCOSE, ISTAT mg/dl  --   --   --   --   --   --   --   --   --   --  151*   CALCIUM mg/dL 8 0* 7 6* 7 8*   < > 8 2*   < > 7 9*   < > 7 7*   < >  --    AST U/L  --   --   --   --  58*  --  53*  --  38   < >  --    ALT U/L  --   --   --   --  12  --  13  --  18   < >  --    ALK PHOS U/L  --   --   --   --  141*  --  163*  --  158*   < >  --     < > = values in this interval not displayed  Results from last 7 days   Lab Units 11/23/22  0531 11/22/22  0606 11/21/22  0454   WBC Thousand/uL 12 78* 10 92* 9 75   HEMOGLOBIN g/dL 8 5* 7 2* 7 6*   PLATELETS Thousands/uL 532* 478* 498*     Results from last 7 days   Lab Units 11/17/22  0016 11/16/22  1315 11/16/22  1314   BLOOD CULTURE  No Growth After 5 Days  No Growth After 5 Days  --   --    GRAM STAIN RESULT   --  No Polys or Bacteria seen No Polys or Bacteria seen       Imaging Studies:   I have personally reviewed pertinent imaging study reports and images in PACS  EKG, Pathology, and Other Studies:   I have personally reviewed pertinent reports

## 2022-11-23 NOTE — PLAN OF CARE
Problem: MOBILITY - ADULT  Goal: Maintain or return to baseline ADL function  Description: INTERVENTIONS:  -  Assess patient's ability to carry out ADLs; assess patient's baseline for ADL function and identify physical deficits which impact ability to perform ADLs (bathing, care of mouth/teeth, toileting, grooming, dressing, etc )  - Assess/evaluate cause of self-care deficits   - Assess range of motion  - Assess patient's mobility; develop plan if impaired  - Assess patient's need for assistive devices and provide as appropriate  - Encourage maximum independence but intervene and supervise when necessary  - Involve family in performance of ADLs  - Assess for home care needs following discharge   - Consider OT consult to assist with ADL evaluation and planning for discharge  - Provide patient education as appropriate  Outcome: Progressing  Goal: Maintains/Returns to pre admission functional level  Description: INTERVENTIONS:  - Perform BMAT or MOVE assessment daily    - Set and communicate daily mobility goal to care team and patient/family/caregiver  - Collaborate with rehabilitation services on mobility goals if consulted  - Perform Range of Motion 4 times a day  - Reposition patient every 2 hours    - Dangle patient 3 times a day  - Stand patient 3 times a day  - Ambulate patient 3 times a day  - Out of bed to chair 3 times a day   - Out of bed for meals 3 times a day  - Out of bed for toileting  - Record patient progress and toleration of activity level   Outcome: Progressing     Problem: Prexisting or High Potential for Compromised Skin Integrity  Goal: Skin integrity is maintained or improved  Description: INTERVENTIONS:  - Identify patients at risk for skin breakdown  - Assess and monitor skin integrity  - Assess and monitor nutrition and hydration status  - Monitor labs   - Assess for incontinence   - Turn and reposition patient  - Assist with mobility/ambulation  - Relieve pressure over bony prominences  - Avoid friction and shearing  - Provide appropriate hygiene as needed including keeping skin clean and dry  - Evaluate need for skin moisturizer/barrier cream  - Collaborate with interdisciplinary team   - Patient/family teaching  - Consider wound care consult   Outcome: Progressing     Problem: Potential for Falls  Goal: Patient will remain free of falls  Description: INTERVENTIONS:  - Educate patient/family on patient safety including physical limitations  - Instruct patient to call for assistance with activity   - Consult OT/PT to assist with strengthening/mobility   - Keep Call bell within reach  - Keep bed low and locked with side rails adjusted as appropriate  - Keep care items and personal belongings within reach  - Initiate and maintain comfort rounds  - Make Fall Risk Sign visible to staff  - Offer Toileting every 2 Hours, in advance of need  - Initiate/Maintain bed alarm  - Obtain necessary fall risk management equipment  - Apply yellow socks and bracelet for high fall risk patients  - Consider moving patient to room near nurses station  Outcome: Progressing     Problem: Nutrition/Hydration-ADULT  Goal: Nutrient/Hydration intake appropriate for improving, restoring or maintaining nutritional needs  Description: Monitor and assess patient's nutrition/hydration status for malnutrition  Collaborate with interdisciplinary team and initiate plan and interventions as ordered  Monitor patient's weight and dietary intake as ordered or per policy  Utilize nutrition screening tool and intervene as necessary  Determine patient's food preferences and provide high-protein, high-caloric foods as appropriate       INTERVENTIONS:  - Monitor oral intake, urinary output, labs, and treatment plans  - Assess nutrition and hydration status and recommend course of action  - Evaluate amount of meals eaten  - Assist patient with eating if necessary   - Allow adequate time for meals  - Recommend/ encourage appropriate diets, oral nutritional supplements, and vitamin/mineral supplements  - Order, calculate, and assess calorie counts as needed  - Recommend, monitor, and adjust tube feedings and TPN/PPN based on assessed needs  - Assess need for intravenous fluids  - Provide specific nutrition/hydration education as appropriate  - Include patient/family/caregiver in decisions related to nutrition  Outcome: Progressing     Problem: PAIN - ADULT  Goal: Verbalizes/displays adequate comfort level or baseline comfort level  Description: Interventions:  - Encourage patient to monitor pain and request assistance  - Assess pain using appropriate pain scale  - Administer analgesics based on type and severity of pain and evaluate response  - Implement non-pharmacological measures as appropriate and evaluate response  - Consider cultural and social influences on pain and pain management  - Notify physician/advanced practitioner if interventions unsuccessful or patient reports new pain  Outcome: Progressing     Problem: INFECTION - ADULT  Goal: Absence or prevention of progression during hospitalization  Description: INTERVENTIONS:  - Assess and monitor for signs and symptoms of infection  - Monitor lab/diagnostic results  - Monitor all insertion sites, i e  indwelling lines, tubes, and drains  - Monitor endotracheal if appropriate and nasal secretions for changes in amount and color  - [place] appropriate cooling/warming therapies per order  - Administer medications as ordered  - Instruct and encourage patient and family to use good hand hygiene technique  - Identify and instruct in appropriate isolation precautions for identified infection/condition  Outcome: Progressing  Goal: Absence of fever/infection during neutropenic period  Description: INTERVENTIONS:  - Monitor WBC    Outcome: Progressing     Problem: SAFETY ADULT  Goal: Maintain or return to baseline ADL function  Description: INTERVENTIONS:  -  Assess patient's ability to carry out ADLs; assess patient's baseline for ADL function and identify physical deficits which impact ability to perform ADLs (bathing, care of mouth/teeth, toileting, grooming, dressing, etc )  - Assess/evaluate cause of self-care deficits   - Assess range of motion  - Assess patient's mobility; develop plan if impaired  - Assess patient's need for assistive devices and provide as appropriate  - Encourage maximum independence but intervene and supervise when necessary  - Involve family in performance of ADLs  - Assess for home care needs following discharge   - Consider OT consult to assist with ADL evaluation and planning for discharge  - Provide patient education as appropriate  Outcome: Progressing  Goal: Maintains/Returns to pre admission functional level  Description: INTERVENTIONS:  - Perform BMAT or MOVE assessment daily    - Set and communicate daily mobility goal to care team and patient/family/caregiver  - Collaborate with rehabilitation services on mobility goals if consulted  - Perform Range of Motion 4 times a day  - Reposition patient every 2 hours    - Dangle patient 3 times a day  - Stand patient 3 times a day  - Ambulate patient 3 times a day  - Out of bed to chair 3 times a day   - Out of bed for meals 3 times a day  - Out of bed for toileting  - Record patient progress and toleration of activity level   Outcome: Progressing  Goal: Patient will remain free of falls  Description: INTERVENTIONS:  - Educate patient/family on patient safety including physical limitations  - Instruct patient to call for assistance with activity   - Consult OT/PT to assist with strengthening/mobility   - Keep Call bell within reach  - Keep bed low and locked with side rails adjusted as appropriate  - Keep care items and personal belongings within reach  - Initiate and maintain comfort rounds  - Make Fall Risk Sign visible to staff  - Offer Toileting every 2 Hours, in advance of need  - Initiate/Maintain bed alarm  - Obtain necessary fall risk management equipment  - Apply yellow socks and bracelet for high fall risk patients  - Consider moving patient to room near nurses station  Outcome: Progressing     Problem: DISCHARGE PLANNING  Goal: Discharge to home or other facility with appropriate resources  Description: INTERVENTIONS:  - Identify barriers to discharge w/patient and caregiver  - Arrange for needed discharge resources and transportation as appropriate  - Identify discharge learning needs (meds, wound care, etc )  - Arrange for interpretive services to assist at discharge as needed  - Refer to Case Management Department for coordinating discharge planning if the patient needs post-hospital services based on physician/advanced practitioner order or complex needs related to functional status, cognitive ability, or social support system  Outcome: Progressing     Problem: Knowledge Deficit  Goal: Patient/family/caregiver demonstrates understanding of disease process, treatment plan, medications, and discharge instructions  Description: Complete learning assessment and assess knowledge base    Interventions:  - Provide teaching at level of understanding  - Provide teaching via preferred learning methods  Outcome: Progressing     Problem: RESPIRATORY - ADULT  Goal: Achieves optimal ventilation and oxygenation  Description: INTERVENTIONS:  - Assess for changes in respiratory status  - Assess for changes in mentation and behavior  - Position to facilitate oxygenation and minimize respiratory effort  - Oxygen administered by appropriate delivery if ordered  - Initiate smoking cessation education as indicated  - Encourage broncho-pulmonary hygiene including cough, deep breathe, Incentive Spirometry  - Assess the need for suctioning and aspirate as needed  - Assess and instruct to report SOB or any respiratory difficulty  - Respiratory Therapy support as indicated  Outcome: Progressing     Problem: GASTROINTESTINAL - ADULT  Goal: Minimal or absence of nausea and/or vomiting  Description: INTERVENTIONS:  - Administer IV fluids if ordered to ensure adequate hydration  - Maintain NPO status until nausea and vomiting are resolved  - Nasogastric tube if ordered  - Administer ordered antiemetic medications as needed  - Provide nonpharmacologic comfort measures as appropriate  - Advance diet as tolerated, if ordered  - Consider nutrition services referral to assist patient with adequate nutrition and appropriate food choices  Outcome: Progressing  Goal: Maintains or returns to baseline bowel function  Description: INTERVENTIONS:  - Assess bowel function  - Encourage oral fluids to ensure adequate hydration  - Administer IV fluids if ordered to ensure adequate hydration  - Administer ordered medications as needed  - Encourage mobilization and activity  - Consider nutritional services referral to assist patient with adequate nutrition and appropriate food choices  Outcome: Progressing  Goal: Maintains adequate nutritional intake  Description: INTERVENTIONS:  - Monitor percentage of each meal consumed  - Identify factors contributing to decreased intake, treat as appropriate  - Assist with meals as needed  - Monitor I&O, weight, and lab values if indicated  - Obtain nutrition services referral as needed  Outcome: Progressing  Goal: Establish and maintain optimal ostomy function  Description: INTERVENTIONS:  - Assess bowel function  - Encourage oral fluids to ensure adequate hydration  - Administer IV fluids if ordered to ensure adequate hydration   - Administer ordered medications as needed  - Encourage mobilization and activity  - Nutrition services referral to assist patient with appropriate food choices  - Assess stoma site  - Consider wound care consult   Outcome: Progressing  Goal: Oral mucous membranes remain intact  Description: INTERVENTIONS  - Assess oral mucosa and hygiene practices  - Implement preventative oral hygiene regimen  - Implement oral medicated treatments as ordered  - Initiate Nutrition services referral as needed  Outcome: Progressing     Problem: GENITOURINARY - ADULT  Goal: Maintains or returns to baseline urinary function  Description: INTERVENTIONS:  - Assess urinary function  - Encourage oral fluids to ensure adequate hydration if ordered  - Administer IV fluids as ordered to ensure adequate hydration  - Administer ordered medications as needed  - Offer frequent toileting  - Follow urinary retention protocol if ordered  Outcome: Progressing  Goal: Absence of urinary retention  Description: INTERVENTIONS:  - Assess patient’s ability to void and empty bladder  - Monitor I/O  - Bladder scan as needed  - Discuss with physician/AP medications to alleviate retention as needed  - Discuss catheterization for long term situations as appropriate  Outcome: Progressing  Goal: Urinary catheter remains patent  Description: INTERVENTIONS:  - Assess patency of urinary catheter  - If patient has a chronic rivero, consider changing catheter if non-functioning  - Follow guidelines for intermittent irrigation of non-functioning urinary catheter  Outcome: Progressing     Problem: METABOLIC, FLUID AND ELECTROLYTES - ADULT  Goal: Electrolytes maintained within normal limits  Description: INTERVENTIONS:  - Monitor labs and assess patient for signs and symptoms of electrolyte imbalances  - Administer electrolyte replacement as ordered  - Monitor response to electrolyte replacements, including repeat lab results as appropriate  - Instruct patient on fluid and nutrition as appropriate  Outcome: Progressing  Goal: Fluid balance maintained  Description: INTERVENTIONS:  - Monitor labs   - Monitor I/O and WT  - Instruct patient on fluid and nutrition as appropriate  - Assess for signs & symptoms of volume excess or deficit  Outcome: Progressing  Goal: Glucose maintained within target range  Description: INTERVENTIONS:  - Monitor Blood Glucose as ordered  - Assess for signs and symptoms of hyperglycemia and hypoglycemia  - Administer ordered medications to maintain glucose within target range  - Assess nutritional intake and initiate nutrition service referral as needed  Outcome: Progressing

## 2022-11-23 NOTE — PLAN OF CARE
Problem: MOBILITY - ADULT  Goal: Maintain or return to baseline ADL function  Description: INTERVENTIONS:  -  Assess patient's ability to carry out ADLs; assess patient's baseline for ADL function and identify physical deficits which impact ability to perform ADLs (bathing, care of mouth/teeth, toileting, grooming, dressing, etc )  - Assess/evaluate cause of self-care deficits   - Assess range of motion  - Assess patient's mobility; develop plan if impaired  - Assess patient's need for assistive devices and provide as appropriate  - Encourage maximum independence but intervene and supervise when necessary  - Involve family in performance of ADLs  - Assess for home care needs following discharge   - Consider OT consult to assist with ADL evaluation and planning for discharge  - Provide patient education as appropriate  Outcome: Progressing  Goal: Maintains/Returns to pre admission functional level  Description: INTERVENTIONS:  - Perform BMAT or MOVE assessment daily    - Set and communicate daily mobility goal to care team and patient/family/caregiver  - Collaborate with rehabilitation services on mobility goals if consulted  - Perform Range of Motion  times a day  - Reposition patient every  hours    - Dangle patient  times a day  - Stand patient  times a day  - Ambulate patient  times a day  - Out of bed to chair  times a day   - Out of bed for meals  times a day  - Out of bed for toileting  - Record patient progress and toleration of activity level   Outcome: Progressing     Problem: Prexisting or High Potential for Compromised Skin Integrity  Goal: Skin integrity is maintained or improved  Description: INTERVENTIONS:  - Identify patients at risk for skin breakdown  - Assess and monitor skin integrity  - Assess and monitor nutrition and hydration status  - Monitor labs   - Assess for incontinence   - Turn and reposition patient  - Assist with mobility/ambulation  - Relieve pressure over bony prominences  - Avoid friction and shearing  - Provide appropriate hygiene as needed including keeping skin clean and dry  - Evaluate need for skin moisturizer/barrier cream  - Collaborate with interdisciplinary team   - Patient/family teaching  - Consider wound care consult   Outcome: Progressing     Problem: Potential for Falls  Goal: Patient will remain free of falls  Description: INTERVENTIONS:  - Educate patient/family on patient safety including physical limitations  - Instruct patient to call for assistance with activity   - Consult OT/PT to assist with strengthening/mobility   - Keep Call bell within reach  - Keep bed low and locked with side rails adjusted as appropriate  - Keep care items and personal belongings within reach  - Initiate and maintain comfort rounds  - Make Fall Risk Sign visible to staff  - Offer Toileting every  Hours, in advance of need  - Initiate/Maintain alarm  - Obtain necessary fall risk management equipment:   - Apply yellow socks and bracelet for high fall risk patients  - Consider moving patient to room near nurses station  Outcome: Progressing     Problem: Nutrition/Hydration-ADULT  Goal: Nutrient/Hydration intake appropriate for improving, restoring or maintaining nutritional needs  Description: Monitor and assess patient's nutrition/hydration status for malnutrition  Collaborate with interdisciplinary team and initiate plan and interventions as ordered  Monitor patient's weight and dietary intake as ordered or per policy  Utilize nutrition screening tool and intervene as necessary  Determine patient's food preferences and provide high-protein, high-caloric foods as appropriate       INTERVENTIONS:  - Monitor oral intake, urinary output, labs, and treatment plans  - Assess nutrition and hydration status and recommend course of action  - Evaluate amount of meals eaten  - Assist patient with eating if necessary   - Allow adequate time for meals  - Recommend/ encourage appropriate diets, oral nutritional supplements, and vitamin/mineral supplements  - Order, calculate, and assess calorie counts as needed  - Recommend, monitor, and adjust tube feedings and TPN/PPN based on assessed needs  - Assess need for intravenous fluids  - Provide specific nutrition/hydration education as appropriate  - Include patient/family/caregiver in decisions related to nutrition  Outcome: Progressing     Problem: PAIN - ADULT  Goal: Verbalizes/displays adequate comfort level or baseline comfort level  Description: Interventions:  - Encourage patient to monitor pain and request assistance  - Assess pain using appropriate pain scale  - Administer analgesics based on type and severity of pain and evaluate response  - Implement non-pharmacological measures as appropriate and evaluate response  - Consider cultural and social influences on pain and pain management  - Notify physician/advanced practitioner if interventions unsuccessful or patient reports new pain  Outcome: Progressing     Problem: INFECTION - ADULT  Goal: Absence or prevention of progression during hospitalization  Description: INTERVENTIONS:  - Assess and monitor for signs and symptoms of infection  - Monitor lab/diagnostic results  - Monitor all insertion sites, i e  indwelling lines, tubes, and drains  - Monitor endotracheal if appropriate and nasal secretions for changes in amount and color  - [place] appropriate cooling/warming therapies per order  - Administer medications as ordered  - Instruct and encourage patient and family to use good hand hygiene technique  - Identify and instruct in appropriate isolation precautions for identified infection/condition  Outcome: Progressing  Goal: Absence of fever/infection during neutropenic period  Description: INTERVENTIONS:  - Monitor WBC    Outcome: Progressing     Problem: SAFETY ADULT  Goal: Maintain or return to baseline ADL function  Description: INTERVENTIONS:  -  Assess patient's ability to carry out ADLs; assess patient's baseline for ADL function and identify physical deficits which impact ability to perform ADLs (bathing, care of mouth/teeth, toileting, grooming, dressing, etc )  - Assess/evaluate cause of self-care deficits   - Assess range of motion  - Assess patient's mobility; develop plan if impaired  - Assess patient's need for assistive devices and provide as appropriate  - Encourage maximum independence but intervene and supervise when necessary  - Involve family in performance of ADLs  - Assess for home care needs following discharge   - Consider OT consult to assist with ADL evaluation and planning for discharge  - Provide patient education as appropriate  Outcome: Progressing  Goal: Maintains/Returns to pre admission functional level  Description: INTERVENTIONS:  - Perform BMAT or MOVE assessment daily    - Set and communicate daily mobility goal to care team and patient/family/caregiver  - Collaborate with rehabilitation services on mobility goals if consulted  - Perform Range of Motion times a day  - Reposition patient every  hours    - Dangle patient  times a day  - Stand patient  times a day  - Ambulate patient  times a day  - Out of bed to chair  times a day   - Out of bed for meals  times a day  - Out of bed for toileting  - Record patient progress and toleration of activity level   Outcome: Progressing  Goal: Patient will remain free of falls  Description: INTERVENTIONS:  - Educate patient/family on patient safety including physical limitations  - Instruct patient to call for assistance with activity   - Consult OT/PT to assist with strengthening/mobility   - Keep Call bell within reach  - Keep bed low and locked with side rails adjusted as appropriate  - Keep care items and personal belongings within reach  - Initiate and maintain comfort rounds  - Make Fall Risk Sign visible to staff  - Offer Toileting every  Hours, in advance of need  - Initiate/Maintain alarm  - Obtain necessary fall risk management equipment:   - Apply yellow socks and bracelet for high fall risk patients  - Consider moving patient to room near nurses station  Outcome: Progressing     Problem: DISCHARGE PLANNING  Goal: Discharge to home or other facility with appropriate resources  Description: INTERVENTIONS:  - Identify barriers to discharge w/patient and caregiver  - Arrange for needed discharge resources and transportation as appropriate  - Identify discharge learning needs (meds, wound care, etc )  - Arrange for interpretive services to assist at discharge as needed  - Refer to Case Management Department for coordinating discharge planning if the patient needs post-hospital services based on physician/advanced practitioner order or complex needs related to functional status, cognitive ability, or social support system  Outcome: Progressing     Problem: Knowledge Deficit  Goal: Patient/family/caregiver demonstrates understanding of disease process, treatment plan, medications, and discharge instructions  Description: Complete learning assessment and assess knowledge base    Interventions:  - Provide teaching at level of understanding  - Provide teaching via preferred learning methods  Outcome: Progressing     Problem: RESPIRATORY - ADULT  Goal: Achieves optimal ventilation and oxygenation  Description: INTERVENTIONS:  - Assess for changes in respiratory status  - Assess for changes in mentation and behavior  - Position to facilitate oxygenation and minimize respiratory effort  - Oxygen administered by appropriate delivery if ordered  - Initiate smoking cessation education as indicated  - Encourage broncho-pulmonary hygiene including cough, deep breathe, Incentive Spirometry  - Assess the need for suctioning and aspirate as needed  - Assess and instruct to report SOB or any respiratory difficulty  - Respiratory Therapy support as indicated  Outcome: Progressing     Problem: GASTROINTESTINAL - ADULT  Goal: Minimal or absence of nausea and/or vomiting  Description: INTERVENTIONS:  - Administer IV fluids if ordered to ensure adequate hydration  - Maintain NPO status until nausea and vomiting are resolved  - Nasogastric tube if ordered  - Administer ordered antiemetic medications as needed  - Provide nonpharmacologic comfort measures as appropriate  - Advance diet as tolerated, if ordered  - Consider nutrition services referral to assist patient with adequate nutrition and appropriate food choices  Outcome: Progressing  Goal: Maintains or returns to baseline bowel function  Description: INTERVENTIONS:  - Assess bowel function  - Encourage oral fluids to ensure adequate hydration  - Administer IV fluids if ordered to ensure adequate hydration  - Administer ordered medications as needed  - Encourage mobilization and activity  - Consider nutritional services referral to assist patient with adequate nutrition and appropriate food choices  Outcome: Progressing  Goal: Maintains adequate nutritional intake  Description: INTERVENTIONS:  - Monitor percentage of each meal consumed  - Identify factors contributing to decreased intake, treat as appropriate  - Assist with meals as needed  - Monitor I&O, weight, and lab values if indicated  - Obtain nutrition services referral as needed  Outcome: Progressing  Goal: Establish and maintain optimal ostomy function  Description: INTERVENTIONS:  - Assess bowel function  - Encourage oral fluids to ensure adequate hydration  - Administer IV fluids if ordered to ensure adequate hydration   - Administer ordered medications as needed  - Encourage mobilization and activity  - Nutrition services referral to assist patient with appropriate food choices  - Assess stoma site  - Consider wound care consult   Outcome: Progressing  Goal: Oral mucous membranes remain intact  Description: INTERVENTIONS  - Assess oral mucosa and hygiene practices  - Implement preventative oral hygiene regimen  - Implement oral medicated treatments as ordered  - Initiate Nutrition services referral as needed  Outcome: Progressing     Problem: GENITOURINARY - ADULT  Goal: Maintains or returns to baseline urinary function  Description: INTERVENTIONS:  - Assess urinary function  - Encourage oral fluids to ensure adequate hydration if ordered  - Administer IV fluids as ordered to ensure adequate hydration  - Administer ordered medications as needed  - Offer frequent toileting  - Follow urinary retention protocol if ordered  Outcome: Progressing  Goal: Absence of urinary retention  Description: INTERVENTIONS:  - Assess patient’s ability to void and empty bladder  - Monitor I/O  - Bladder scan as needed  - Discuss with physician/AP medications to alleviate retention as needed  - Discuss catheterization for long term situations as appropriate  Outcome: Progressing  Goal: Urinary catheter remains patent  Description: INTERVENTIONS:  - Assess patency of urinary catheter  - If patient has a chronic rivero, consider changing catheter if non-functioning  - Follow guidelines for intermittent irrigation of non-functioning urinary catheter  Outcome: Progressing     Problem: METABOLIC, FLUID AND ELECTROLYTES - ADULT  Goal: Electrolytes maintained within normal limits  Description: INTERVENTIONS:  - Monitor labs and assess patient for signs and symptoms of electrolyte imbalances  - Administer electrolyte replacement as ordered  - Monitor response to electrolyte replacements, including repeat lab results as appropriate  - Instruct patient on fluid and nutrition as appropriate  Outcome: Progressing  Goal: Fluid balance maintained  Description: INTERVENTIONS:  - Monitor labs   - Monitor I/O and WT  - Instruct patient on fluid and nutrition as appropriate  - Assess for signs & symptoms of volume excess or deficit  Outcome: Progressing  Goal: Glucose maintained within target range  Description: INTERVENTIONS:  - Monitor Blood Glucose as ordered  - Assess for signs and symptoms of hyperglycemia and hypoglycemia  - Administer ordered medications to maintain glucose within target range  - Assess nutritional intake and initiate nutrition service referral as needed  Outcome: Progressing

## 2022-11-24 LAB
ALBUMIN SERPL BCP-MCNC: 1.6 G/DL (ref 3.5–5)
ALP SERPL-CCNC: 467 U/L (ref 46–116)
ALT SERPL W P-5'-P-CCNC: 19 U/L (ref 12–78)
ANION GAP SERPL CALCULATED.3IONS-SCNC: 3 MMOL/L (ref 4–13)
ANION GAP SERPL CALCULATED.3IONS-SCNC: 4 MMOL/L (ref 4–13)
AST SERPL W P-5'-P-CCNC: 68 U/L (ref 5–45)
BASOPHILS # BLD AUTO: 0.04 THOUSANDS/ÂΜL (ref 0–0.1)
BASOPHILS NFR BLD AUTO: 0 % (ref 0–1)
BILIRUB SERPL-MCNC: 2.04 MG/DL (ref 0.2–1)
BUN SERPL-MCNC: 33 MG/DL (ref 5–25)
BUN SERPL-MCNC: 36 MG/DL (ref 5–25)
CALCIUM ALBUM COR SERPL-MCNC: 9.7 MG/DL (ref 8.3–10.1)
CALCIUM SERPL-MCNC: 7.8 MG/DL (ref 8.3–10.1)
CALCIUM SERPL-MCNC: 8 MG/DL (ref 8.3–10.1)
CHLORIDE SERPL-SCNC: 124 MMOL/L (ref 96–108)
CHLORIDE SERPL-SCNC: 125 MMOL/L (ref 96–108)
CO2 SERPL-SCNC: 25 MMOL/L (ref 21–32)
CO2 SERPL-SCNC: 25 MMOL/L (ref 21–32)
CREAT SERPL-MCNC: 1.39 MG/DL (ref 0.6–1.3)
CREAT SERPL-MCNC: 1.42 MG/DL (ref 0.6–1.3)
EOSINOPHIL # BLD AUTO: 0.51 THOUSAND/ÂΜL (ref 0–0.61)
EOSINOPHIL NFR BLD AUTO: 4 % (ref 0–6)
ERYTHROCYTE [DISTWIDTH] IN BLOOD BY AUTOMATED COUNT: 16.5 % (ref 11.6–15.1)
GFR SERPL CREATININE-BSD FRML MDRD: 54 ML/MIN/1.73SQ M
GFR SERPL CREATININE-BSD FRML MDRD: 55 ML/MIN/1.73SQ M
GLUCOSE SERPL-MCNC: 235 MG/DL (ref 65–140)
GLUCOSE SERPL-MCNC: 238 MG/DL (ref 65–140)
GLUCOSE SERPL-MCNC: 252 MG/DL (ref 65–140)
GLUCOSE SERPL-MCNC: 272 MG/DL (ref 65–140)
GLUCOSE SERPL-MCNC: 273 MG/DL (ref 65–140)
GLUCOSE SERPL-MCNC: 296 MG/DL (ref 65–140)
HCT VFR BLD AUTO: 24.2 % (ref 36.5–49.3)
HGB BLD-MCNC: 7.2 G/DL (ref 12–17)
IMM GRANULOCYTES # BLD AUTO: 0.16 THOUSAND/UL (ref 0–0.2)
IMM GRANULOCYTES NFR BLD AUTO: 1 % (ref 0–2)
LYMPHOCYTES # BLD AUTO: 1.03 THOUSANDS/ÂΜL (ref 0.6–4.47)
LYMPHOCYTES NFR BLD AUTO: 8 % (ref 14–44)
MAGNESIUM SERPL-MCNC: 2.5 MG/DL (ref 1.6–2.6)
MCH RBC QN AUTO: 28.6 PG (ref 26.8–34.3)
MCHC RBC AUTO-ENTMCNC: 29.8 G/DL (ref 31.4–37.4)
MCV RBC AUTO: 96 FL (ref 82–98)
MONOCYTES # BLD AUTO: 1.21 THOUSAND/ÂΜL (ref 0.17–1.22)
MONOCYTES NFR BLD AUTO: 10 % (ref 4–12)
NEUTROPHILS # BLD AUTO: 9.61 THOUSANDS/ÂΜL (ref 1.85–7.62)
NEUTS SEG NFR BLD AUTO: 77 % (ref 43–75)
NRBC BLD AUTO-RTO: 0 /100 WBCS
PHOSPHATE SERPL-MCNC: 3.1 MG/DL (ref 2.7–4.5)
PLATELET # BLD AUTO: 496 THOUSANDS/UL (ref 149–390)
PMV BLD AUTO: 10.6 FL (ref 8.9–12.7)
POTASSIUM SERPL-SCNC: 4 MMOL/L (ref 3.5–5.3)
POTASSIUM SERPL-SCNC: 4 MMOL/L (ref 3.5–5.3)
PROT SERPL-MCNC: 6.4 G/DL (ref 6.4–8.4)
RBC # BLD AUTO: 2.52 MILLION/UL (ref 3.88–5.62)
SODIUM SERPL-SCNC: 153 MMOL/L (ref 135–147)
SODIUM SERPL-SCNC: 153 MMOL/L (ref 135–147)
WBC # BLD AUTO: 12.56 THOUSAND/UL (ref 4.31–10.16)

## 2022-11-24 RX ORDER — OXYCODONE HCL 5 MG/5 ML
5 SOLUTION, ORAL ORAL EVERY 4 HOURS PRN
Status: DISCONTINUED | OUTPATIENT
Start: 2022-11-24 | End: 2022-12-03 | Stop reason: HOSPADM

## 2022-11-24 RX ORDER — OXYCODONE HCL 5 MG/5 ML
10 SOLUTION, ORAL ORAL EVERY 4 HOURS PRN
Status: DISCONTINUED | OUTPATIENT
Start: 2022-11-24 | End: 2022-12-03 | Stop reason: HOSPADM

## 2022-11-24 RX ORDER — PANTOPRAZOLE SODIUM 40 MG/1
40 TABLET, DELAYED RELEASE ORAL
Status: DISCONTINUED | OUTPATIENT
Start: 2022-11-24 | End: 2022-11-25

## 2022-11-24 RX ORDER — INSULIN LISPRO 100 [IU]/ML
5 INJECTION, SOLUTION INTRAVENOUS; SUBCUTANEOUS
Status: DISCONTINUED | OUTPATIENT
Start: 2022-11-25 | End: 2022-11-25 | Stop reason: SDUPTHER

## 2022-11-24 RX ORDER — INSULIN LISPRO 100 [IU]/ML
5 INJECTION, SOLUTION INTRAVENOUS; SUBCUTANEOUS ONCE
Status: COMPLETED | OUTPATIENT
Start: 2022-11-24 | End: 2022-11-24

## 2022-11-24 RX ORDER — LANOLIN ALCOHOL/MO/W.PET/CERES
6 CREAM (GRAM) TOPICAL
Status: DISCONTINUED | OUTPATIENT
Start: 2022-11-24 | End: 2022-12-03 | Stop reason: HOSPADM

## 2022-11-24 RX ORDER — INSULIN GLARGINE 100 [IU]/ML
15 INJECTION, SOLUTION SUBCUTANEOUS
Status: DISCONTINUED | OUTPATIENT
Start: 2022-11-24 | End: 2022-11-25

## 2022-11-24 RX ORDER — QUETIAPINE FUMARATE 25 MG/1
25 TABLET, FILM COATED ORAL
Status: DISCONTINUED | OUTPATIENT
Start: 2022-11-24 | End: 2022-12-03 | Stop reason: HOSPADM

## 2022-11-24 RX ORDER — ALBUMIN (HUMAN) 12.5 G/50ML
25 SOLUTION INTRAVENOUS ONCE
Status: COMPLETED | OUTPATIENT
Start: 2022-11-24 | End: 2022-11-24

## 2022-11-24 RX ORDER — INSULIN LISPRO 100 [IU]/ML
2-12 INJECTION, SOLUTION INTRAVENOUS; SUBCUTANEOUS EVERY 6 HOURS SCHEDULED
Status: DISCONTINUED | OUTPATIENT
Start: 2022-11-24 | End: 2022-11-24

## 2022-11-24 RX ORDER — INSULIN LISPRO 100 [IU]/ML
1-6 INJECTION, SOLUTION INTRAVENOUS; SUBCUTANEOUS
Status: DISCONTINUED | OUTPATIENT
Start: 2022-11-24 | End: 2022-11-26

## 2022-11-24 RX ADMIN — ATORVASTATIN CALCIUM 40 MG: 40 TABLET, FILM COATED ORAL at 18:11

## 2022-11-24 RX ADMIN — ERTAPENEM SODIUM 1000 MG: 1 INJECTION, POWDER, LYOPHILIZED, FOR SOLUTION INTRAMUSCULAR; INTRAVENOUS at 22:28

## 2022-11-24 RX ADMIN — GABAPENTIN 100 MG: 100 CAPSULE ORAL at 18:11

## 2022-11-24 RX ADMIN — FOLIC ACID 1 MG: 1 TABLET ORAL at 09:02

## 2022-11-24 RX ADMIN — ALBUMIN (HUMAN) 25 G: 0.25 INJECTION, SOLUTION INTRAVENOUS at 13:04

## 2022-11-24 RX ADMIN — INSULIN HUMAN 3 UNITS: 100 INJECTION, SOLUTION PARENTERAL at 11:44

## 2022-11-24 RX ADMIN — THIAMINE HCL TAB 100 MG 100 MG: 100 TAB at 09:02

## 2022-11-24 RX ADMIN — INSULIN GLARGINE 15 UNITS: 100 INJECTION, SOLUTION SUBCUTANEOUS at 21:39

## 2022-11-24 RX ADMIN — HEPARIN SODIUM 5000 UNITS: 5000 INJECTION INTRAVENOUS; SUBCUTANEOUS at 13:16

## 2022-11-24 RX ADMIN — INSULIN LISPRO 1 UNITS: 100 INJECTION, SOLUTION INTRAVENOUS; SUBCUTANEOUS at 17:30

## 2022-11-24 RX ADMIN — HEPARIN SODIUM 5000 UNITS: 5000 INJECTION INTRAVENOUS; SUBCUTANEOUS at 06:26

## 2022-11-24 RX ADMIN — MELATONIN 6 MG: at 22:56

## 2022-11-24 RX ADMIN — INSULIN HUMAN 10 UNITS: 100 INJECTION, SOLUTION PARENTERAL at 06:27

## 2022-11-24 RX ADMIN — QUETIAPINE FUMARATE 25 MG: 25 TABLET ORAL at 22:56

## 2022-11-24 RX ADMIN — CHLORHEXIDINE GLUCONATE 15 ML: 1.2 SOLUTION ORAL at 21:35

## 2022-11-24 RX ADMIN — CHLOROTHIAZIDE SODIUM 500 MG: 500 INJECTION, POWDER, LYOPHILIZED, FOR SOLUTION INTRAVENOUS at 13:03

## 2022-11-24 RX ADMIN — ACETAMINOPHEN 650 MG: 325 TABLET ORAL at 23:02

## 2022-11-24 RX ADMIN — CHLORHEXIDINE GLUCONATE 15 ML: 1.2 SOLUTION ORAL at 09:01

## 2022-11-24 RX ADMIN — HEPARIN SODIUM 5000 UNITS: 5000 INJECTION INTRAVENOUS; SUBCUTANEOUS at 21:39

## 2022-11-24 RX ADMIN — ACETAMINOPHEN 650 MG: 325 TABLET ORAL at 06:26

## 2022-11-24 RX ADMIN — THIAMINE HYDROCHLORIDE 100 MG: 100 INJECTION, SOLUTION INTRAMUSCULAR; INTRAVENOUS at 21:30

## 2022-11-24 RX ADMIN — OXYCODONE HYDROCHLORIDE 5 MG: 5 SOLUTION ORAL at 06:26

## 2022-11-24 RX ADMIN — INSULIN HUMAN 10 UNITS: 100 INJECTION, SOLUTION PARENTERAL at 11:43

## 2022-11-24 RX ADMIN — INSULIN LISPRO 6 UNITS: 100 INJECTION, SOLUTION INTRAVENOUS; SUBCUTANEOUS at 06:26

## 2022-11-24 RX ADMIN — INSULIN LISPRO 5 UNITS: 100 INJECTION, SOLUTION INTRAVENOUS; SUBCUTANEOUS at 21:34

## 2022-11-24 RX ADMIN — CYANOCOBALAMIN TAB 500 MCG 500 MCG: 500 TAB at 09:01

## 2022-11-24 RX ADMIN — GABAPENTIN 100 MG: 100 CAPSULE ORAL at 09:01

## 2022-11-24 RX ADMIN — OXYCODONE HYDROCHLORIDE 5 MG: 5 SOLUTION ORAL at 23:03

## 2022-11-24 NOTE — PROGRESS NOTES
Daily Progress Note - Critical Care   Jessica Ferriera 61 y o  male MRN: [id number]  Unit/Bed#: [facility] 205-01 Encounter: [id number]        ----------------------------------------------------------------------------------------  Brief HPI:  60yo M with metastatic colon cancer s/p transverse colectomy and partial hepatectomy (segment 3) 11/7/2022 11/8: upgraded to critical care for acute encephalopathy, tachycardia, tachypnea  11/16: anastomotic leak, was taken for ex-lap and right hemicolectomy, open abdomen with abthera vac  11/17: OR for diverting loop ileostomy and midline vac    24hr events:   Febrile to 101 2F overnight, patient had been refusing tylenol  Afebrile after dose of tylenol  Insufficient glycemic control, blood glucose >200  ---------------------------------------------------------------------------------------  SUBJECTIVE  Having neuropathic pain in his bilateral hands and feet this morning, not complaining of abdominal pain  Feeling thirsty, asking for water  Review of Systems   Constitutional: Negative  HENT: Negative  Respiratory: Negative  Cardiovascular: Negative  Gastrointestinal: Negative  Genitourinary: Negative  Musculoskeletal: Negative  Skin: Negative  Neurological:        Neuropathy of bilateral hands and feet   Psychiatric/Behavioral: Negative        Review of systems was reviewed and negative unless stated above in HPI/24-hour events   ---------------------------------------------------------------------------------------  Assessment and Plan:    Neuro:   • Acute toxic/metabolic encephalopathy  o Plan:   - Frequent neuro checks  • Pain  - Plan:  • Appreciate ongoing recs from palliative  • Multimodal pain regimen:  o Scheduled tylenol 650mg q6h, oxy 5mg q6h  o PRN oxy 5/10 q4h, dilaudid 0 3mg q3h  • Delirium  o Plan:   • Cont seroquel 25mg QHS  • Maintain sleep/wake cycle  • CAM-ICU BID  • Chemo-induced peripheral neuropathy  o Plan:   - Cont home duloxetine 60mg QD  - Cont gabapentin 100mg BID, titrate PRN, monitor renal function    CV:   • Hypertension  o Plan:   - Goal SBP <180  - Goal MAP >65 mmHg  - Cont amlodipine 2 5mg  • Home dose amlodipine 5mg BID, holding home lisinopril 40mg QD  • Hyperlipidemia  - Plan:  • Cont atorvastatin 40mg QHS  • Resolved issues: paroxysmal atrial tachycardia, hypotension  o Plan:   - Continuous cardiac monitoring  - Replete electrolytes as necessary      Pulm:  • No acute issues  o Plan:   - Wean supplemental O2 as tolerated  - Maintain SpO2 >92%  - Cont pulmonary hygiene: IS every hour while awake, encourage coughing/deep breathing, upright positioning  - Cont diuresis as needed  • Resolved: acute hypoxic respiratory failure  o Was intubated 11/16-22  o Now on supplemental O2 via NC  o Plan:   - CXR as needed      GI:   • Metastatic colon cancer  o s/p transverse colectomy and partial hepatectomy (segment 3) 11/7/2022   o 11/8: upgraded to critical care for acute encephalopathy, tachycardia, tachypnea  o 11/16: anastomotic leak, was taken for ex-lap and right hemicolectomy, open abdomen with abthera vac  o 11/17: OR for diverting loop ileostomy and midline vac  o Plan:   - Maintain VICTORINO drains, monitor output and character  - Wound vac changes per surg onc team, MWF  - Cont dysphagia diet, encourage PO water intake  • Stress ulcer ppx  - Plan:  • Cont home protonix PO 40mg QD  • Bowel regimen  o Plan:   • On hold for now  • Maintain ileostomy  • Monitor ileostomy output and character      :   • ALEXY on CKD  o Baseline creatinine 1 22-1 39  o Admission creat: 1 73  o Peak creat: 2 33  o Creatinine pending this AM  o Plan:   - Strict I/Os Q2H  - Monitor BMP  - Cont albumin and chlorothiazide for I/O balance goal -1L      F/E/N/Lines:   • F: off IVF, encourage PO free water intake  • E: replete PRN, K >4, Phos >3, Mg >2  o Hypernatremic  - Encourage free water PO intake  - Continue to monitor  · N: dysphagia diet  · Lines/drains: VICTORINO x2, ileostomy, port, PIV x2    Heme/Onc:   • Acute on chronic anemia  o Hgb on admission 10 1, baseline 7 5-10 6  o AM Hgb: 7 1  o Total blood products 5u pRBC  o Plan:   - Transfuse for hgb <7  • DVT ppx  o Plan:   - SQH  - SCDs      Endo:   • T2DM  o Plan:   - Appreciate endo recs: started lantus 15u QHS, humalog  - SSI algorithm 4  - Goal blood glucose 140-180    ID:   • ESBL E coli bacteremia  o Culture history:  - 11/17 blood cultures x2 negative  - 11/16 tissue culture: E coli ESBL  - 11/10 blood cultures x2 negative  - 11/8 blood cultures x2 E coli ESBL  o Plan:   - Appreciate ID recs  • Completed course of ertapenem  - Monitor fever and WBC curve      MSK/Skin:   • Physical deconditioning  o Plan:   - Frequent repositioning q2h  - Offloading pressure points  - PT/OT    Disposition: stable for transfer out of ICU care  Code Status: Level 1 - Full Code  ---------------------------------------------------------------------------------------  ICU CORE MEASURES    Prophylaxis   VTE Pharmacologic Prophylaxis: Heparin  VTE Mechanical Prophylaxis: sequential compression device  Stress Ulcer Prophylaxis: Pantoprazole PO    ABCDE Protocol (if indicated)  Plan to perform spontaneous awakening trial today? Not applicable  Plan to perform spontaneous breathing trial today? Not applicable  Obvious barriers to extubation? Not applicable  CAM-ICU: Positive     Invasive Devices Review  Invasive Devices     Central Venous Catheter Line  Duration           Port A Cath 03/10/22 Right Chest 259 days          Peripheral Intravenous Line  Duration           Peripheral IV 11/23/22 Right Antecubital 1 day    Peripheral IV 11/24/22 Right;Upper;Ventral (anterior) Arm <1 day          Drain  Duration           Closed/Suction Drain Lateral;Right Abdomen 8 days    Closed/Suction Drain Left Abdomen Bulb 19 Fr  8 days    Ileostomy LUQ 7 days              Can any invasive devices be discontinued today? No  ---------------------------------------------------------------------------------------  OBJECTIVE    Vitals   Vitals:    22 2345 22 0045 22 0108 22 0145   BP: 130/74 140/72  146/71   BP Location:       Pulse: 100 94  90   Resp:       Temp:   98 5 °F (36 9 °C)    TempSrc:   Oral    SpO2: 97% 98%  96%   Weight:       Height:         Temp (24hrs), Av 8 °F (37 1 °C), Min:97 5 °F (36 4 °C), Max:101 2 °F (38 4 °C)  Current: Temperature: 97 9 °F (36 6 °C)  HR: 90  BP: 146/71  RR: 18  SpO2: 96%    Respiratory:  SpO2 Device: O2 Device: Nasal cannula  Nasal Cannula O2 Flow Rate (L/min): 2 L/min    Invasive/non-invasive ventilation settings   Respiratory    Lab Data (Last 4 hours)    None         O2/Vent Data (Last 4 hours)    None                Physical Exam  Vitals reviewed  Constitutional:       General: He is not in acute distress  HENT:      Head: Normocephalic and atraumatic  Right Ear: External ear normal       Left Ear: External ear normal       Nose: Nose normal       Mouth/Throat:      Mouth: Mucous membranes are dry  Eyes:      Extraocular Movements: Extraocular movements intact  Pupils: Pupils are equal, round, and reactive to light  Cardiovascular:      Rate and Rhythm: Normal rate and regular rhythm  Pulses: Normal pulses  Pulmonary:      Effort: Pulmonary effort is normal  No respiratory distress  Abdominal:      General: There is no distension  Palpations: Abdomen is soft  Tenderness: There is no abdominal tenderness  Comments: Ileostomy with gas and stool per bag, midline vac with appropriate seal, right abd incision staples intact and minimal drainage  R VICTORINO drain with no output, L VICTORINO drain with minimal SS output   Musculoskeletal:         General: Normal range of motion  Cervical back: Normal range of motion and neck supple  No rigidity  Skin:     General: Skin is warm  Neurological:      General: No focal deficit present  Mental Status: He is alert  Sensory: No sensory deficit  Comments: Oriented, but with some incoherent phrases               Laboratory and Diagnostics:  Results from last 7 days   Lab Units 11/25/22  0459 11/24/22  0517 11/23/22  0531 11/22/22  0606 11/21/22  0454 11/20/22  0602 11/19/22  1337   WBC Thousand/uL 11 82* 12 56* 12 78* 10 92* 9 75 11 32* 14 21*   HEMOGLOBIN g/dL 7 1* 7 2* 8 5* 7 2* 7 6* 7 7* 7 0*   HEMATOCRIT % 24 4* 24 2* 28 4* 24 1* 24 6* 24 0* 20 9*   PLATELETS Thousands/uL 527* 496* 532* 478* 498* 467* 402*   NEUTROS PCT % 81* 77* 82* 79* 76* 85* 88*   MONOS PCT % 9 10 8 11 12 9 7     Results from last 7 days   Lab Units 11/24/22  1436 11/24/22  0517 11/23/22  0531 11/22/22  1755 11/22/22  0606 11/21/22  0454 11/20/22  0602 11/19/22  0500   SODIUM mmol/L 153* 153* 149* 149* 149* 147 147 143   POTASSIUM mmol/L 4 0 4 0 4 5 3 9 4 1 3 6 3 7 3 8   CHLORIDE mmol/L 125* 124* 122* 121* 120* 118* 115* 110*   CO2 mmol/L 25 25 23 22 25 24 24 22   ANION GAP mmol/L 3* 4 4 6 4 5 8 11   BUN mg/dL 33* 36* 42* 42* 47* 55* 65* 56*   CREATININE mg/dL 1 39* 1 42* 1 59* 1 51* 1 61* 1 83* 2 34* 2 93*   CALCIUM mg/dL 8 0* 7 8* 8 0* 7 6* 7 8* 8 0* 7 7* 8 2*   GLUCOSE RANDOM mg/dL 252* 273* 243* 207* 207* 174* 153* 159*   ALT U/L  --  19  --   --   --   --   --  12   AST U/L  --  68*  --   --   --   --   --  58*   ALK PHOS U/L  --  467*  --   --   --   --   --  141*   ALBUMIN g/dL  --  1 6*  --   --   --   --   --  1 9*   TOTAL BILIRUBIN mg/dL  --  2 04*  --   --   --   --   --  3 33*     Results from last 7 days   Lab Units 11/24/22  0517 11/23/22  0531 11/22/22  0606 11/21/22  0454 11/20/22  0602 11/19/22  0500 11/18/22 2122   MAGNESIUM mg/dL 2 5 2 6 2 7* 3 0* 2 9* 2 9* 2 9*   PHOSPHORUS mg/dL 3 1 3 8 3 3 3 8 5 5* 8 3* 8 1*               Results from last 7 days   Lab Units 11/18/22 2122   LACTIC ACID mmol/L 1 3     ABG:  Results from last 7 days   Lab Units 11/18/22 2102   PH ART  7 424   PCO2 ART mm Hg 34 6*   PO2 ART mm Hg 133 7*   HCO3 ART mmol/L 22 1   BASE EXC ART mmol/L -2 0   ABG SOURCE  Radial, Left     VBG:  Results from last 7 days   Lab Units 11/18/22  2102   ABG SOURCE  Radial, Left     Results from last 7 days   Lab Units 11/23/22  0531   PROCALCITONIN ng/ml 1 54*       Micro        Imaging:  I have personally reviewed pertinent films in PACS    Intake and Output  I/O       11/22 0701 11/23 0700 11/23 0701 11/24 0700 11/24 0701 11/25 0700    P  O  0 0 120    I V  (mL/kg) 132 (1 1) 30 (0 3)     NG/ 975 40    IV Piggyback 50 50 230    Feedings 815 1228 178    Cell Saver  0     Total Intake(mL/kg) 1457 (12 3) 2283 (20) 568 (5)    Urine (mL/kg/hr) 2350 (0 8) 1980 (0 7) 600 (0 5)    Emesis/NG output 0 0 0    Drains 115 35 70    Stool 325 450 300    Total Output 2790 2465 970    Net -1333 -182 -402               UOP: 0 7 ml/kg/hr     Height and Weights   Height: 5' 8" (172 7 cm)  IBW (Ideal Body Weight): 68 4 kg  Body mass index is 38 25 kg/m²  Weight (last 2 days)     Date/Time Weight    11/24/22 0626 114 (251 55)            Nutrition       Diet Orders   (From admission, onward)             Start     Ordered    11/24/22 1020  Diet Dysphagia/Modified Consistency; Dysphagia 1-Pureed; Thin Liquid; Dysphagia 1-Pureed  Diet effective now        Comments: Encourage oral intake   References:    Nutrtion Support Algorithm Enteral vs  Parenteral   Question Answer Comment   Diet Type Dysphagia/Modified Consistency    Dysphagia/Modified Consistency Dysphagia 1-Pureed    Liquid Modifier Thin Liquid    Other Restriction(s): Dysphagia 1-Pureed    RD to adjust diet per protocol?  No        11/24/22 1024              TF not running, currently on PO diet      Active Medications  Scheduled Meds:  Current Facility-Administered Medications   Medication Dose Route Frequency Provider Last Rate   • acetaminophen  650 mg Oral Q6H [address] Katerin Kothari DO     • atorvastatin  40 mg Per NG Tube After [employer] NANETTE Villatoro     • chlorhexidine  15 mL Mouth/Throat Q12H [facility] GAURANG Acharya     • cyanocobalamin  500 mcg Per NG Tube Daily Maggy Villatoro PA-C     • DULoxetine  60 mg Oral Daily GAURANG Ruiz     • folic acid  1 mg Per NG Tube Daily GAURANG Ruiz     • gabapentin  100 mg Oral BID GAURANG Ruiz     • heparin (porcine)  5,000 Units Subcutaneous [facility] GAURANG Ruiz     • HYDROmorphone  0 3 mg Intravenous Q3H PRN GAURANG Felix     • insulin glargine  15 Units Subcutaneous HS Madelyne Fabry, MD     • insulin lispro  1-6 Units Subcutaneous TID AC Madelyne Fabry, MD     • insulin lispro  5 Units Subcutaneous TID With Meals Mallory Chavira MD     • melatonin  6 mg Oral HS GAURANG Ruiz     • naloxone  0 04 mg Intravenous Q1MIN PRN Bhargavi Morataya PA-C     • ondansetron  4 mg Intravenous Q4H PRN GAURANG Ruiz     • oxyCODONE  5 mg Oral Q4H PRN Mary Jane Torres MD      Or   • oxyCODONE  10 mg Oral Q4H PRN Mary Jane Torres MD     • oxyCODONE  5 mg Per NG Tube Q6H Katerin Kothari DO     • pantoprazole  40 mg Oral Early Morning GAURANG Ruiz     • QUEtiapine  25 mg Oral HS Nan Calixto PA-C     • thiamine  100 mg Intravenous [facility] Stopped (11/24/22 3029)     Continuous Infusions:     PRN Meds:   HYDROmorphone, 0 3 mg, Q3H PRN  naloxone, 0 04 mg, Q1MIN PRN  ondansetron, 4 mg, Q4H PRN  oxyCODONE, 5 mg, Q4H PRN   Or  oxyCODONE, 10 mg, Q4H PRN        Allergies   Allergies   Allergen Reactions   • Shellfish-Derived Products - Food Allergy Anaphylaxis   • Erythromycin GI Intolerance     ---------------------------------------------------------------------------------------  Advance Directive and Living Will:      Power of :    POLST:    ---------------------------------------------------------------------------------------  Care Time Delivered:     Pedro Jacobson, MD      Portions of the record may have been created with voice recognition software  Occasional wrong word or "sound a like" substitutions may have occurred due to the inherent limitations of voice recognition software    Read the chart carefully and recognize, using context, where substitutions have occurred

## 2022-11-24 NOTE — QUICK NOTE
Patient seen and evaluated by Critical Care today and deemed to be appropriate for later transfer to [facility]  Outside of the walls of the unit  Spoke to Dr Dustin Montana from Surgical Oncology to accept patient transfer  Patient did not move from ICU on the day of transfer  See progress note from 11/24/2022 for the most up-to-date treatment therapy plans  Critical care will sign off when patient is outside the walls of the ICU  Please reach out with any questions  Critical care can be contacted via [employer] with any questions or concerns

## 2022-11-24 NOTE — PROGRESS NOTES
Progress note - Palliative and Supportive Care   Sydell Form 61 y o  male [id number]    Patient Active Problem List   Diagnosis   • Type 2 diabetes mellitus without complication, with long-term current use of insulin (HCC)   • Benign essential hypertension   • Mixed hyperlipidemia   • Erectile dysfunction   • Low testosterone   • Obesity (BMI 30-39  9)   • Testicular hypogonadism   • Incarcerated umbilical hernia   • Left ureteral calculus   • Type 2 diabetes mellitus with hyperlipidemia (HCC)   • Colonic mass   • Microcytic anemia   • Hypokalemia   • Transaminitis   • Thrombocytosis   • Iron deficiency anemia, unspecified   • Malignant neoplasm of transverse colon (HCC)   • 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 liver [facility])   • Colon cancer metastasized to liver [facility])   • Colostomy prolapse (HCC)   • Other fatigue   • Cervical radiculopathy   • Encephalopathy   • Flash pulmonary edema (HCC)   • MR (mitral regurgitation)   • Bacteremia   • ESBL (extended spectrum beta-lactamase) producing bacteria infection   • Acute respiratory failure with hypoxia ([address] )     Mariia  is a 62 yo male with palliative Dx of metastatic colon cancer s/p colectomy and partial hepatectomy 79/5, complicated by anastamotic leak s/p ex-lap evacuation of hematoma and right hemicolectomy 11/16 and loop ileostomy 11/17  He was successfully extubated and PSC is on board for ongoing [address] and pain control  Active issues specifically addressed today include:     Metastatic Colon Cancer  Cancer-associated pain  Palliative care patient    Plan:  1  Symptom management -     · Tylenol ATC  · Duloxetine 60 mg qdaily  · Gabapentin 100 mg BID  · Oxycodone 5 mg q6 ATC   · Oxycodone 5-10 mg q4 prn  · Hydromorphone 0 3 mg IVP q3 prn for breakthrough pain    2  Goals - full cares     Code Status: full - Level 1   Decisional apparatus:  Patient is competent on my exam today    If competence is lost, patient's substitute decision maker would default to spouse by [place]  Advance Directive / Living Will / POLST:  none    Interval history: On my encounter today, the patient is lying in bed with NGT and he is emotional  He reports that his pain is controlled but that he is very frustrated with the limitations of being in the ICU and not being able to drink anything  He prefers not to take pain meds if possible  He c/o dry mouth and asks for sprite or coke and is provided with a sponge dipped in soda to wet his mouth  He weeps throughout the visit, asks when his children are coming, clearly is in a significant amount of emotional/spiritual distress, nonetheless jokes with me to try to lighten the mood  MEDICATIONS / ALLERGIES:     all current active meds have been reviewed    Allergies   Allergen Reactions   • Shellfish-Derived Products - Food Allergy Anaphylaxis   • Erythromycin GI Intolerance       OBJECTIVE:    Physical Exam  Physical Exam  Constitutional:       General: He is not in acute distress  Appearance: He is obese  He is ill-appearing  HENT:      Head: Atraumatic  Nose:      Comments: NGT      Mouth/Throat:      Mouth: Mucous membranes are dry  Eyes:      Extraocular Movements: Extraocular movements intact  Pupils: Pupils are equal, round, and reactive to light  Cardiovascular:      Rate and Rhythm: Normal rate  Pulmonary:      Effort: Pulmonary effort is normal  No respiratory distress  Abdominal:      General: There is no distension  Palpations: Abdomen is soft  Musculoskeletal:         General: Normal range of motion  Cervical back: Normal range of motion  Comments: RUE edema tender to palpation   Skin:     General: Skin is warm and dry  Neurological:      Mental Status: He is alert and oriented to person, place, and time  Psychiatric:      Comments: Sheryn Epley, appears frustrated, affect appropriate to mood         Lab Results: I have personally reviewed pertinent labs    Imaging Studies: pertinent imaging reviewed   EKG, Pathology, and Other Studies: pertinent studies reviewed    Counseling / Coordination of Care    Total floor / unit time spent today 30 minutes  Greater than 50% of total time was spent with the patient and / or family counseling and / or coordination of care   A description of the counseling / coordination of care: assessment, ongoing GOC, emotional support

## 2022-11-24 NOTE — PROGRESS NOTES
Progress Note - Bevely Guardian 61 y o  male MRN: [id number]    Unit/Bed#: [facility] 377-45 Encounter: [id number]      CC: diabetes f/u    Subjective: This is a 61y o -year-old male with past medical history of type 2 diabetes mellitus, hypertension, hyperlipidemia, GERD, iron deficiency anemia , 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 who presented to the hospital for ex lap, transverse colon resection, segment 3 liver resection, ablation, reversal of colostomy on 11/07, hospital course complicated with left upper quadrant hematoma and ESBL bacteremia   Endocrinology consulted for the management of type 2 diabetes mellitus in the setting of liver metastases  Currently pt off of tube feeds    Objective:     Vitals: Blood pressure 143/73, pulse 90, temperature 97 8 °F (36 6 °C), resp  rate 22, height 5' 8" (1 727 m), weight 114 kg (251 lb 8 7 oz), SpO2 96 %  ,Body mass index is 38 25 kg/m²        Intake/Output Summary (Last 24 hours) at 11/24/2022 1146  Last data filed at 11/24/2022 1000  Gross per 24 hour   Intake 2222 ml   Output 2530 ml   Net -308 ml       Physical Exam:  General Appearance: awake, appears stated age and cooperative  Head: Normocephalic, without obvious abnormality, atraumatic  Extremities: moves all extremities  Skin: Skin color and temperature normal    Pulm: no labored breathing      POC Glucose (mg/dl)   Date Value   11/24/2022 296 (H)   11/23/2022 272 (H)   11/23/2022 290 (H)   11/23/2022 252 (H)   11/23/2022 274 (H)   11/22/2022 215 (H)   11/22/2022 197 (H)   11/22/2022 203 (H)   11/22/2022 185 (H)   11/21/2022 196 (H)       Assessment and plan:     Type 2 diabetes mellitus with hyperglycemia  HbA1c 8 0 September 2022  Home regimen:  Januvia 100 mg daily, metformin glyburide 5-500 mg QD, CGM [place]rish  Inpatient regimen:   Lantus 10 units at bedtime, correctional scale algo with meals and at bedtime     Recommendations:  Currently pt is off of tube feeds  Recommend to discontinue regular insulin  Plan to start Dysphagia diet today  Recommend to start Lantus 15 units tonight and correctional scale  Continue with accu checks     Portions of the record may have been created with voice recognition software

## 2022-11-24 NOTE — PROGRESS NOTES
Progress Note - Infectious Disease   Demetrio Zamora 61 y o  male MRN: [id number]  Unit/Bed#: [facility] 517-01 Encounter: [id number]      Impression/Recommendations:  1  Sepsis, secondary to intra-abdominal infection and bacteremia   Patient is clinically much improved on antibiotic and status post I&D/bowel resection   Temperature up and down but trending   WBC is much decreased   He is currently not on any pressors  Antibiotic plan as in below  Monitor temperature/WBC  Monitor hemodynamics      2  ESBL producing E coli bacteremia, likely secondary to gut translocation during recent colon surgery   Patient is clinically improved   Bacteremia cleared   Patient completed antibiotic course for this but given probable anastomotic leak, will continue antibiotic for that indication  No further antibiotic needed for this      3  Anastomotic leak up with intra-abdominal abscess/infected hematoma   Patient is status post exploratory laparotomy, with drainage of abscess and cecum resection   Operative culture growing ESBL producing E coli again, with lactobacillus   Patient had repeat ex lap, with healthy-appearing bowel   Repeat CT with resolved intra-abdominal collections  Continue IV ertapenem  Treat x7 days postop, complete today      4  Metastatic colon cancer, with liver metastases   Patient is status post transverse colon resection  Surgical oncology follow-up      5  ALEXY, superimposed on CKD   Creatinine worsen postop but improving again  Antibiotic at full dose for now  Monitor creatinine       6  Acute hypoxic respiratory failure, previously on ventilator   Patient was extubated but now intubated again postop   No clinical pneumonia  Monitor respiratory status      7  Encephalopathy, multifactorial   Mental status was improved but now sedated on ventilator   He is responsive and attempts to communicate  on ventilator    Monitor mental status      8  Type 2 DM      Discussed with patient in detail regarding the above plan      Antibiotics:  Ertapenem # 16  POD # 8/7     Subjective:  Patient is awake and alert  Mild abdominal discomfort  Temperature stays down  No chills  He is tolerating antibiotic well   No nausea, vomiting or diarrhea      Objective:  Vitals:  Temp:  [97 8 °F (36 6 °C)-100 °F (37 8 °C)] 97 8 °F (36 6 °C)  HR:  [] 93  Resp:  [12-49] 27  BP: (129-170)/(64-85) 143/73  SpO2:  [92 %-98 %] 95 %  Temp (24hrs), Av 9 °F (37 2 °C), Min:97 8 °F (36 6 °C), Max:100 °F (37 8 °C)  Current: Temperature: 97 8 °F (36 6 °C)    Physical Exam:     General: Awake, alert, cooperative, no distress  Neck:  Supple  No mass  No lymphadenopathy  Lungs: Expansion symmetric, no rales, no wheezing, respirations unlabored  Heart:  Regular rate and rhythm, S1 and S2 normal, no murmur  Abdomen: Soft, mild distention  Wound with VAC, without purulence  No erythema/warmth  Mild tenderness      Extremities: Stable leg edema  No erythema/warmth  No ulcer  Nontender to palpation  Skin:  No rash  Neuro: Moves all extremities  Invasive Devices     Central Venous Catheter Line  Duration           Port A Cath 03/10/22 Right Chest 258 days          Peripheral Intravenous Line  Duration           Peripheral IV 22 Left;Upper;Ventral (anterior) Arm 2 days    Peripheral IV 22 Right Antecubital 1 day          Drain  Duration           Urethral Catheter 16 Fr  8 days    Closed/Suction Drain Lateral;Right Abdomen 7 days    Closed/Suction Drain Left Abdomen Bulb 19 Fr  7 days    NG/OG/Enteral Tube Nasogastric 7 days    Ileostomy LUQ 6 days                Labs studies:   I have personally reviewed pertinent labs    Results from last 7 days   Lab Units 22  0517 22  0531 22  1755 22  0602 22  0500 22  2122 22  0447   POTASSIUM mmol/L 4 0 4 5 3 9   < > 3 8   < > 4 8   CHLORIDE mmol/L 124* 122* 121*   < > 110*   < > 109*   CO2 mmol/L 25 23 22   < > 22   < > 21   BUN mg/dL 36* 42* 42*   < > 56*   < > 63*   CREATININE mg/dL 1 42* 1 59* 1 51*   < > 2 93*   < > 2 91*   EGFR ml/min/1 73sq m 54 47 50   < > 22   < > 22   CALCIUM mg/dL 7 8* 8 0* 7 6*   < > 8 2*   < > 7 9*   AST U/L 68*  --   --   --  58*  --  53*   ALT U/L 19  --   --   --  12  --  13   ALK PHOS U/L 467*  --   --   --  141*  --  163*    < > = values in this interval not displayed  Results from last 7 days   Lab Units 11/24/22  0517 11/23/22  0531 11/22/22  0606   WBC Thousand/uL 12 56* 12 78* 10 92*   HEMOGLOBIN g/dL 7 2* 8 5* 7 2*   PLATELETS Thousands/uL 496* 532* 478*           Imaging Studies:   I have personally reviewed pertinent imaging study reports and images in PACS  EKG, Pathology, and Other Studies:   I have personally reviewed pertinent reports

## 2022-11-24 NOTE — PROGRESS NOTES
Progress Note - Surgical Oncology  Sandy Sepulveda 61 y o  male MRN: [id number]  Unit/Bed#: [facility] 517-01 Encounter: [id number]    Assessment:  60 y/o M w metastatic colon ca s/p transverse colectomy & partial hepatectomy 11/7/22 c/b anastomotic leak, ex lap, R hemicolectomy 11/16/22 and loop ileostomy 11/17       Afebrile  Urine 1 9  L VICTORINO 35 ss  R VICTORINO 0  Vac 0  Stool 450    bipap overnight 14/ 8    WBC 12 from 12         Plan:  Monitor on nasal cannula  Recommend pull NGT, start oral feeds  Continue iv abx, ertapenem until 11/24, appreciate ID recs  Monitor fever curve  Maintain wound vac, changes MWF  Maintain VICTORINO drains  dvt ppx SQH  ICU diamox bid       Subjective/Objective     Subjective:   No acute events overnight  Tolerating at goal tube feeds  Objective:    Blood pressure 170/76, pulse 102, temperature 98 °F (36 7 °C), temperature source Oral, resp  rate (!) 49, height 5' 8" (1 727 m), weight 118 kg (259 lb 7 7 oz), SpO2 93 %  ,Body mass index is 39 45 kg/m²        Intake/Output Summary (Last 24 hours) at 11/23/2022 2248  Last data filed at 11/23/2022 1800  Gross per 24 hour   Intake 1442 ml   Output 2160 ml   Net -718 ml       Invasive Devices     Central Venous Catheter Line  Duration           Port A Cath 03/10/22 Right Chest 258 days          Peripheral Intravenous Line  Duration           Peripheral IV 11/20/22 Right;Ventral (anterior) Forearm 3 days    Peripheral IV 11/21/22 Left;Upper;Ventral (anterior) Arm 2 days    Peripheral IV 11/23/22 Right Antecubital <1 day          Drain  Duration           Urethral Catheter 16 Fr  8 days    Closed/Suction Drain Lateral;Right Abdomen 7 days    Closed/Suction Drain Left Abdomen Bulb 19 Fr  7 days    NG/OG/Enteral Tube Nasogastric 7 days    Ileostomy LUQ 6 days                Physical Exam:   Gen: NAD, Comfortable  Neuro: A&O, No focal deficits  Head: Normal Cephalic, Atraumatic  Eye: EOMI, PERRLA, No scleral icterus  Neck: Supple, No JVD, Midline trachea  CV: RRR, Cap refill <2 sec  Pulm: Normal work of breathing, no respiratory distress  Abd: Soft, Non-Distended, Tender  Ext: No edema, Non-tender  Skin: warm, dry, intact

## 2022-11-24 NOTE — PLAN OF CARE
Problem: MOBILITY - ADULT  Goal: Maintain or return to baseline ADL function  Description: INTERVENTIONS:  -  Assess patient's ability to carry out ADLs; assess patient's baseline for ADL function and identify physical deficits which impact ability to perform ADLs (bathing, care of mouth/teeth, toileting, grooming, dressing, etc )  - Assess/evaluate cause of self-care deficits   - Assess range of motion  - Assess patient's mobility; develop plan if impaired  - Assess patient's need for assistive devices and provide as appropriate  - Encourage maximum independence but intervene and supervise when necessary  - Involve family in performance of ADLs  - Assess for home care needs following discharge   - Consider OT consult to assist with ADL evaluation and planning for discharge  - Provide patient education as appropriate  Outcome: Progressing  Goal: Maintains/Returns to pre admission functional level  Description: INTERVENTIONS:  - Perform BMAT or MOVE assessment daily    - Set and communicate daily mobility goal to care team and patient/family/caregiver  - Collaborate with rehabilitation services on mobility goals if consulted  - Perform Range of Motion  times a day  - Reposition patient every  hours    - Dangle patient  times a day  - Stand patient  times a day  - Ambulate patient  times a day  - Out of bed to chair  times a day   - Out of bed for meals  times a day  - Out of bed for toileting  - Record patient progress and toleration of activity level   Outcome: Progressing     Problem: MOBILITY - ADULT  Goal: Maintain or return to baseline ADL function  Description: INTERVENTIONS:  -  Assess patient's ability to carry out ADLs; assess patient's baseline for ADL function and identify physical deficits which impact ability to perform ADLs (bathing, care of mouth/teeth, toileting, grooming, dressing, etc )  - Assess/evaluate cause of self-care deficits   - Assess range of motion  - Assess patient's mobility; develop plan if impaired  - Assess patient's need for assistive devices and provide as appropriate  - Encourage maximum independence but intervene and supervise when necessary  - Involve family in performance of ADLs  - Assess for home care needs following discharge   - Consider OT consult to assist with ADL evaluation and planning for discharge  - Provide patient education as appropriate  Outcome: Progressing  Goal: Maintains/Returns to pre admission functional level  Description: INTERVENTIONS:  - Perform BMAT or MOVE assessment daily    - Set and communicate daily mobility goal to care team and patient/family/caregiver  - Collaborate with rehabilitation services on mobility goals if consulted  - Perform Range of Motion  times a day  - Reposition patient every  hours    - Dangle patient  times a day  - Stand patient  times a day  - Ambulate patient  times a day  - Out of bed to chair  times a day   - Out of bed for meals  times a day  - Out of bed for toileting  - Record patient progress and toleration of activity level   Outcome: Progressing     Problem: Prexisting or High Potential for Compromised Skin Integrity  Goal: Skin integrity is maintained or improved  Description: INTERVENTIONS:  - Identify patients at risk for skin breakdown  - Assess and monitor skin integrity  - Assess and monitor nutrition and hydration status  - Monitor labs   - Assess for incontinence   - Turn and reposition patient  - Assist with mobility/ambulation  - Relieve pressure over bony prominences  - Avoid friction and shearing  - Provide appropriate hygiene as needed including keeping skin clean and dry  - Evaluate need for skin moisturizer/barrier cream  - Collaborate with interdisciplinary team   - Patient/family teaching  - Consider wound care consult   Outcome: Progressing     Problem: Potential for Falls  Goal: Patient will remain free of falls  Description: INTERVENTIONS:  - Educate patient/family on patient safety including physical limitations  - Instruct patient to call for assistance with activity   - Consult OT/PT to assist with strengthening/mobility   - Keep Call bell within reach  - Keep bed low and locked with side rails adjusted as appropriate  - Keep care items and personal belongings within reach  - Initiate and maintain comfort rounds  - Make Fall Risk Sign visible to staff  - Offer Toileting every  Hours, in advance of need  - Initiate/Maintain alarm  - Obtain necessary fall risk management equipment:   - Apply yellow socks and bracelet for high fall risk patients  - Consider moving patient to room near nurses station  Outcome: Progressing     Problem: Nutrition/Hydration-ADULT  Goal: Nutrient/Hydration intake appropriate for improving, restoring or maintaining nutritional needs  Description: Monitor and assess patient's nutrition/hydration status for malnutrition  Collaborate with interdisciplinary team and initiate plan and interventions as ordered  Monitor patient's weight and dietary intake as ordered or per policy  Utilize nutrition screening tool and intervene as necessary  Determine patient's food preferences and provide high-protein, high-caloric foods as appropriate       INTERVENTIONS:  - Monitor oral intake, urinary output, labs, and treatment plans  - Assess nutrition and hydration status and recommend course of action  - Evaluate amount of meals eaten  - Assist patient with eating if necessary   - Allow adequate time for meals  - Recommend/ encourage appropriate diets, oral nutritional supplements, and vitamin/mineral supplements  - Order, calculate, and assess calorie counts as needed  - Recommend, monitor, and adjust tube feedings and TPN/PPN based on assessed needs  - Assess need for intravenous fluids  - Provide specific nutrition/hydration education as appropriate  - Include patient/family/caregiver in decisions related to nutrition  Outcome: Progressing     Problem: PAIN - ADULT  Goal: Verbalizes/displays adequate comfort level or baseline comfort level  Description: Interventions:  - Encourage patient to monitor pain and request assistance  - Assess pain using appropriate pain scale  - Administer analgesics based on type and severity of pain and evaluate response  - Implement non-pharmacological measures as appropriate and evaluate response  - Consider cultural and social influences on pain and pain management  - Notify physician/advanced practitioner if interventions unsuccessful or patient reports new pain  Outcome: Progressing     Problem: INFECTION - ADULT  Goal: Absence or prevention of progression during hospitalization  Description: INTERVENTIONS:  - Assess and monitor for signs and symptoms of infection  - Monitor lab/diagnostic results  - Monitor all insertion sites, i e  indwelling lines, tubes, and drains  - Monitor endotracheal if appropriate and nasal secretions for changes in amount and color  - [place] appropriate cooling/warming therapies per order  - Administer medications as ordered  - Instruct and encourage patient and family to use good hand hygiene technique  - Identify and instruct in appropriate isolation precautions for identified infection/condition  Outcome: Progressing  Goal: Absence of fever/infection during neutropenic period  Description: INTERVENTIONS:  - Monitor WBC    Outcome: Progressing     Problem: SAFETY ADULT  Goal: Maintain or return to baseline ADL function  Description: INTERVENTIONS:  -  Assess patient's ability to carry out ADLs; assess patient's baseline for ADL function and identify physical deficits which impact ability to perform ADLs (bathing, care of mouth/teeth, toileting, grooming, dressing, etc )  - Assess/evaluate cause of self-care deficits   - Assess range of motion  - Assess patient's mobility; develop plan if impaired  - Assess patient's need for assistive devices and provide as appropriate  - Encourage maximum independence but intervene and supervise when necessary  - Involve family in performance of ADLs  - Assess for home care needs following discharge   - Consider OT consult to assist with ADL evaluation and planning for discharge  - Provide patient education as appropriate  Outcome: Progressing  Goal: Maintains/Returns to pre admission functional level  Description: INTERVENTIONS:  - Perform BMAT or MOVE assessment daily    - Set and communicate daily mobility goal to care team and patient/family/caregiver  - Collaborate with rehabilitation services on mobility goals if consulted  - Perform Range of Motion  times a day  - Reposition patient every  hours    - Dangle patient  times a day  - Stand patient  times a day  - Ambulate patient  times a day  - Out of bed to chair  times a day   - Out of bed for meals  times a day  - Out of bed for toileting  - Record patient progress and toleration of activity level   Outcome: Progressing  Goal: Patient will remain free of falls  Description: INTERVENTIONS:  - Educate patient/family on patient safety including physical limitations  - Instruct patient to call for assistance with activity   - Consult OT/PT to assist with strengthening/mobility   - Keep Call bell within reach  - Keep bed low and locked with side rails adjusted as appropriate  - Keep care items and personal belongings within reach  - Initiate and maintain comfort rounds  - Make Fall Risk Sign visible to staff  - Offer Toileting every  Hours, in advance of need  - Initiate/Maintain alarm  - Obtain necessary fall risk management equipment:  - Apply yellow socks and bracelet for high fall risk patients  - Consider moving patient to room near nurses station  Outcome: Progressing     Problem: DISCHARGE PLANNING  Goal: Discharge to home or other facility with appropriate resources  Description: INTERVENTIONS:  - Identify barriers to discharge w/patient and caregiver  - Arrange for needed discharge resources and transportation as appropriate  - Identify discharge learning needs (meds, wound care, etc )  - Arrange for interpretive services to assist at discharge as needed  - Refer to Case Management Department for coordinating discharge planning if the patient needs post-hospital services based on physician/advanced practitioner order or complex needs related to functional status, cognitive ability, or social support system  Outcome: Progressing     Problem: Knowledge Deficit  Goal: Patient/family/caregiver demonstrates understanding of disease process, treatment plan, medications, and discharge instructions  Description: Complete learning assessment and assess knowledge base    Interventions:  - Provide teaching at level of understanding  - Provide teaching via preferred learning methods  Outcome: Progressing     Problem: RESPIRATORY - ADULT  Goal: Achieves optimal ventilation and oxygenation  Description: INTERVENTIONS:  - Assess for changes in respiratory status  - Assess for changes in mentation and behavior  - Position to facilitate oxygenation and minimize respiratory effort  - Oxygen administered by appropriate delivery if ordered  - Initiate smoking cessation education as indicated  - Encourage broncho-pulmonary hygiene including cough, deep breathe, Incentive Spirometry  - Assess the need for suctioning and aspirate as needed  - Assess and instruct to report SOB or any respiratory difficulty  - Respiratory Therapy support as indicated  Outcome: Progressing     Problem: GASTROINTESTINAL - ADULT  Goal: Minimal or absence of nausea and/or vomiting  Description: INTERVENTIONS:  - Administer IV fluids if ordered to ensure adequate hydration  - Maintain NPO status until nausea and vomiting are resolved  - Nasogastric tube if ordered  - Administer ordered antiemetic medications as needed  - Provide nonpharmacologic comfort measures as appropriate  - Advance diet as tolerated, if ordered  - Consider nutrition services referral to assist patient with adequate nutrition and appropriate food choices  Outcome: Progressing  Goal: Maintains or returns to baseline bowel function  Description: INTERVENTIONS:  - Assess bowel function  - Encourage oral fluids to ensure adequate hydration  - Administer IV fluids if ordered to ensure adequate hydration  - Administer ordered medications as needed  - Encourage mobilization and activity  - Consider nutritional services referral to assist patient with adequate nutrition and appropriate food choices  Outcome: Progressing  Goal: Maintains adequate nutritional intake  Description: INTERVENTIONS:  - Monitor percentage of each meal consumed  - Identify factors contributing to decreased intake, treat as appropriate  - Assist with meals as needed  - Monitor I&O, weight, and lab values if indicated  - Obtain nutrition services referral as needed  Outcome: Progressing  Goal: Establish and maintain optimal ostomy function  Description: INTERVENTIONS:  - Assess bowel function  - Encourage oral fluids to ensure adequate hydration  - Administer IV fluids if ordered to ensure adequate hydration   - Administer ordered medications as needed  - Encourage mobilization and activity  - Nutrition services referral to assist patient with appropriate food choices  - Assess stoma site  - Consider wound care consult   Outcome: Progressing  Goal: Oral mucous membranes remain intact  Description: INTERVENTIONS  - Assess oral mucosa and hygiene practices  - Implement preventative oral hygiene regimen  - Implement oral medicated treatments as ordered  - Initiate Nutrition services referral as needed  Outcome: Progressing

## 2022-11-24 NOTE — PROCEDURES
Acute Care Surgery  Bedside V A C  Procedure Note    A timeout was performed prior to beginning the dressing change  Location of wound: midline    Dressings and Foam removed:  1 tape Dressings  2 Black Foam  0 White Foam    Dimensions of wound: 25 cm x 1 cm x 1 cm    Description of wound: On inspection of the wound today, there was no necrotic or nonviable tissue requiring debridement  VAC dressing application:  The periwound skin was cleaned and dried  1 tape dressings, 2 black foam, 0 white foam were cut to size of the wound and placed into the wound  The dressings were then covered with VAC drape  Additional VAC drape and black foam was used to create a bridge to the patient's LUQ and a base for the track pad  The track pad was then placed over the base of black foam  The VAC was then set to 125 mmHg low Continuous suction  The patient tolerated the procedure well and there were no complications  The patient did not require any excisional debridement during today's dressing change  VAC settings:  125 mmHg  Continuous    Wound Images: Additional Notes: The [facility] sticker placed over the dressing per protocol  This dressing change took greater than 20 minutes to complete      Juan M Valerio MD  11/23/2022 7:23 PM

## 2022-11-24 NOTE — PROGRESS NOTES
Daily Progress Note - Critical Care   Sagrario Norman 61 y o  male MRN: [id number]  Unit/Bed#: [facility] 517-01 Encounter: [id number]        ----------------------------------------------------------------------------------------  HPI/24hr events:   · No significant overnight events  · Ongoing neuropathic pain, particularly in lower extremities     ---------------------------------------------------------------------------------------  SUBJECTIVE  "When will I get this tube out?"    Review of Systems   Constitutional: Positive for fatigue  Respiratory: Negative for shortness of breath  Gastrointestinal: Positive for abdominal distention  Negative for abdominal pain  Neurological:        Neuropathic pain in lower extremities      Review of systems was reviewed and negative unless stated above in HPI/24-hour events   ---------------------------------------------------------------------------------------  Assessment and Plan  Principal Problem:    Colon cancer metastasized to liver [facility])  Active Problems:    Benign essential hypertension    Type 2 diabetes mellitus with hyperlipidemia (HCC)    Malignant neoplasm of transverse colon (HCC)    Encephalopathy    Flash pulmonary edema (HCC)    MR (mitral regurgitation)    Bacteremia    ESBL (extended spectrum beta-lactamase) producing bacteria infection    Acute respiratory failure with hypoxia (HCC)  Resolved Problems:    * No resolved hospital problems  *      Neuro:   · Acute toxic/metabolic encephalopathy  ? Continues to improve   ? Related to periods of prior continuous sedation and critical illness with possible developing delirium   ? Frequent neuro checks     ? Sleep/wake cycle regulation as able   § Start Melatonin 6mg qHS  ? CAM-ICU BID  · Chemotherapy-induced peripheral neuropathy    ? Resume home duloxetine 60mg daily   ? Start gabapentin 100mg BID  § Monitor renal function and increase as tolerated/indicated   · Post-surgical pain  ?  Palliative care following, appreciate recommendations   ? Neuropathy mediations described above   ? Scheduled medication  § Tylenol 650mg q6h   § Oxycodone 5mg q6h   § Patient requesting not to take as many opiate medications in order to be more awake   ? PRN medications  § Oxycodone 5-10mg q4h PRN  § Dilaudid 0 3mg q3h PRN        CV:   • Paroxysmal atrial tachycardia - Resolved   o Continue telemetry monitoring  o Optimize electrolytes: K > 4 0, Mag > 2 0 to prevent further arrhythmia   • Hypotension  - resolved   • Hypertension   o Maintain SBP < 180  o Maintain MAP >65  o Holding home antihypertensives:   - Amlodipine 5mg BID  • Resume 2 5mg today with slow increase   - Lisinopril 40mg daily    • Hyperlipidemia  o Atorvastatin 40mg qHS      Pulm:  • Acute hypoxic respiratory failure - resolved   o Intubated 11/16/22 - 11/22/22  o Wean supplemental oxygen as able to maintain SpO2 > 9)%   o Continue pulmonary hygiene  Incentive spirometer q1h while awake, encourage coughing and deep breathing   Upright positioning  o CXR as needed   o Continue diuresis       GI:   • Colon cancer with liver metastasis, s/p remote diverting ostomy   o POD #17 s/p /p ex-lap, transverse colonic resection, reversal of loop colostomy, and hepatic resection, complicated by development of anastomotic hematoma  o POD #8 s/p ex-lap right hemicolectomy, resection of cecum for anastomotic leak, left in discontinuity with Abthera VAC, VICTORINO x2   o POD #7 s/p ex-lap, terminal ilium to left colon anastomosis with diverting loop ileostomy  o Monitor VICTORINO drains  • Stress ulcer prophylaxis  o Omeprazole 20mg daily   o Change to home Protonix PO 40mg daily   • Bowel regimen  o None   o Monitor ileostomy output       :   • Acute kidney injury on CKD   o Multifactorial secondary to ATN, sepsis, possible RANDY  o Baseline creatinine: 1 22 - 1 39  o Admission creatinine: 1 73  o Peak creatinine: 2 33  o Last creatinine: 1 59 (11/23/22)  - Follow up AM BMP  o Strict q2h I/O monitoring  o Discontinue rivero catheter today (day # 10)   o Continue to provide resuscitation and supportive care  o Continue to follow renal function tests    F/E/N:   • Fluids  o Maintenance fluids: None  • Electrolytes   o Hypernatremia  - Cautiously diurese further today   - If TF remain in place increase free water flushes   o Replete electrolytes with as needed to maintain K >4 0, Mag >2 0, Phos >3 0  • Nutrition  o Currently tolerating tube feeds at goal  o Consider discontinuing NGT to allow for oral diet as tolerated  o Can consider calorie count   o Mild oropharyngeal dysphagia  - Puree diet (level 1) with thin liquids     Heme/Onc:   • Acute blood loss anemia superimposed on chronic anemia   o Baseline hemoglobin: 7 5 - 10 6  o Admission hemoglobin: 10 1  o Last hemoglobin: 8 5 (11/23/22)   - 11/19/22: 1u PRBC  - 11/16/22 2u PRBC   - 11/13/22 1u PRBC  - 11/8/22 2u PRBC  o Transfuse for hemoglobin <7 0  • VTE prophylaxis:  SQH TID, SCD's to BLE    Endo/Rheum:   • Insulin-dependent DM2  o Last hemoglobin A1c 8 0% on 9/26/22  o Insulin regular 10 Units q6h   - Change to TID with meals when taking regular meals   o Sliding scale coverage q6h (algorithm #4)   - Adjust to AC/HS when taking PO on regular dietary schedule  o Adjust insulin regimen as needed to maintain goal -180  o Endocrinology consulted       ID:   • ESBL E coli bacteremia  o Suspected colonic translocation   o Worsened clinical exam with evidence of anastomotic leak, left on ertapenem with additional of ancef/flagyl intraoperatively  o Current Antibiotics: Ertapenem   - Antibiotic day # 15  - Plan to complete course through today for total of 7 days post-operative from source control   o Continue to monitor fever and WBC curve   o Follow up ID recommendations   o New Cultures:  - 11/17/22 BCXx2: Negative    - 11/16/22 Tissue culture: E coli ESBL  - 11/10/22 BCXx2: Negative   - 11/8/22 BCXx2: E coli ESBL      MSK/Skin:   • Physical deconditioning   o Reposition q2h, eliminate pressure points while in bed  o Close skin surveillance   o PT/OT       Disposition: Transfer to Stepdown Level 2  Patient appropriate for transfer out of the ICU today?: Patient meets criteria for referral to the ICU Follow-up Clinic; referral has been made  Code Status: Level 1 - Full Code  ---------------------------------------------------------------------------------------  Invasive Devices Review  Invasive Devices     Central Venous Catheter Line  Duration           Port A Cath 03/10/22 Right Chest 258 days          Peripheral Intravenous Line  Duration           Peripheral IV 22 Right;Ventral (anterior) Forearm 3 days    Peripheral IV 22 Left;Upper;Ventral (anterior) Arm 2 days    Peripheral IV 22 Right Antecubital <1 day          Drain  Duration           Urethral Catheter 16 Fr  8 days    Closed/Suction Drain Lateral;Right Abdomen 7 days    Closed/Suction Drain Left Abdomen Bulb 19 Fr  7 days    NG/OG/Enteral Tube Nasogastric 7 days    Ileostomy LUQ 6 days              Can any invasive devices be discontinued today? Yes  ---------------------------------------------------------------------------------------  OBJECTIVE    Vitals   Vitals:    22 0000 22 0100 22 0200 22 0324   BP: 159/83 140/69 141/72    Pulse: 93 90 82    Resp: (!) 26 13 12    Temp:       TempSrc:       SpO2: 94% 94% 94% 97%   Weight:       Height:         Temp (24hrs), Av 5 °F (37 5 °C), Min:98 °F (36 7 °C), Max:100 2 °F (37 9 °C)  Current: Temperature: 98 °F (36 7 °C)      Physical Exam  Vitals reviewed  Constitutional:       General: He is sleeping  He is not in acute distress  Appearance: Normal appearance  Interventions: Nasal cannula in place  HENT:      Head: Normocephalic and atraumatic        Mouth/Throat:      Mouth: Mucous membranes are moist    Eyes:      Conjunctiva/sclera: Conjunctivae normal       Pupils: Pupils are equal, round, and reactive to light  Cardiovascular:      Rate and Rhythm: Normal rate and regular rhythm  Pulses:           Radial pulses are 2+ on the right side and 2+ on the left side  Dorsalis pedis pulses are 1+ on the right side and 1+ on the left side  Heart sounds: Normal heart sounds  Pulmonary:      Effort: Pulmonary effort is normal  Tachypnea present  Breath sounds: Examination of the right-middle field reveals decreased breath sounds  Examination of the right-lower field reveals decreased breath sounds  Examination of the left-lower field reveals decreased breath sounds  Decreased breath sounds present  No wheezing or rhonchi  Abdominal:      General: Abdomen is protuberant  There is distension  Palpations: Abdomen is soft  Tenderness: There is abdominal tenderness  Genitourinary:     Comments: Sruthi  Musculoskeletal:      Right lower le+ Pitting Edema present  Left lower le+ Pitting Edema present  Skin:     General: Skin is warm and dry  Capillary Refill: Capillary refill takes less than 2 seconds  Neurological:      General: No focal deficit present  Mental Status: He is easily aroused  GCS: GCS eye subscore is 4  GCS verbal subscore is 5  GCS motor subscore is 6  Motor: Weakness present  Psychiatric:         Behavior: Behavior is cooperative               Laboratory and Diagnostics:  Results from last 7 days   Lab Units 22  0531 22  0606 22  0454 22  0602 22  1337 22  0500 22  0041 22  2241 22  0447   WBC Thousand/uL 12 78* 10 92* 9 75 11 32* 14 21* 17 72*  --  17 07* 27 12*   HEMOGLOBIN g/dL 8 5* 7 2* 7 6* 7 7* 7 0* 7 1* 6 3* 6 4* 8 1*   HEMATOCRIT % 28 4* 24 1* 24 6* 24 0* 20 9* 22 0*  --  19 7* 24 9*   PLATELETS Thousands/uL 532* 478* 498* 467* 402* 386  --  384 410*   NEUTROS PCT % 82* 79* 76* 85* 88* 87*  --   --  87*   MONOS PCT % 8 11 12 9 7 8  --   --  8 Results from last 7 days   Lab Units 11/23/22  0531 11/22/22  1755 11/22/22  0606 11/21/22  0454 11/20/22  0602 11/19/22  0500 11/19/22  0041 11/18/22 2122 11/18/22  0447   SODIUM mmol/L 149* 149* 149* 147 147 143 142   < > 141   POTASSIUM mmol/L 4 5 3 9 4 1 3 6 3 7 3 8 4 2   < > 4 8   CHLORIDE mmol/L 122* 121* 120* 118* 115* 110* 110*   < > 109*   CO2 mmol/L 23 22 25 24 24 22 23   < > 21   ANION GAP mmol/L 4 6 4 5 8 11 9   < > 11   BUN mg/dL 42* 42* 47* 55* 65* 56* 71*   < > 63*   CREATININE mg/dL 1 59* 1 51* 1 61* 1 83* 2 34* 2 93* 3 14*   < > 2 91*   CALCIUM mg/dL 8 0* 7 6* 7 8* 8 0* 7 7* 8 2* 8 4   < > 7 9*   GLUCOSE RANDOM mg/dL 243* 207* 207* 174* 153* 159* 166*   < > 175*   ALT U/L  --   --   --   --   --  12  --   --  13   AST U/L  --   --   --   --   --  58*  --   --  53*   ALK PHOS U/L  --   --   --   --   --  141*  --   --  163*   ALBUMIN g/dL  --   --   --   --   --  1 9*  --   --  1 6*   TOTAL BILIRUBIN mg/dL  --   --   --   --   --  3 33*  --   --  2 85*    < > = values in this interval not displayed  Results from last 7 days   Lab Units 11/23/22  0531 11/22/22  0606 11/21/22  0454 11/20/22  0602 11/19/22  0500 11/18/22 2122 11/18/22  0447   MAGNESIUM mg/dL 2 6 2 7* 3 0* 2 9* 2 9* 2 9* 2 6   PHOSPHORUS mg/dL 3 8 3 3 3 8 5 5* 8 3* 8 1* 8 7*               Results from last 7 days   Lab Units 11/18/22 2122 11/17/22  1603   LACTIC ACID mmol/L 1 3 1 5     ABG:  Results from last 7 days   Lab Units 11/18/22 2102   PH ART  7 424   PCO2 ART mm Hg 34 6*   PO2 ART mm Hg 133 7*   HCO3 ART mmol/L 22 1   BASE EXC ART mmol/L -2 0   ABG SOURCE  Radial, Left     VBG:  Results from last 7 days   Lab Units 11/18/22 2102   ABG SOURCE  Radial, Left     Results from last 7 days   Lab Units 11/23/22  0531   PROCALCITONIN ng/ml 1 54*       Micro        EKG: NSR  Imaging:   I have personally reviewed pertinent reports     and I have personally reviewed pertinent films in PACS    Intake and Output  I/O       11/22 0701  11/23 0700 11/23 0701  11/24 0700    P  O  0 0    I V  (mL/kg) 132 (1 1) 30 (0 3)    NG/ 640    IV Piggyback 50 50    Feedings 815 821    Cell Saver  0    Total Intake(mL/kg) 1457 (12 3) 1541 (13 1)    Urine (mL/kg/hr) 2350 (0 8) 1530 (0 5)    Emesis/NG output 0 0    Drains 115 25    Stool 325 250    Total Output 2790 1805    Net -0330 -595                  Height and Weights   Height: 5' 8" (172 7 cm)  IBW (Ideal Body Weight): 68 4 kg  Body mass index is 39 45 kg/m²  Weight (last 2 days)     None            Nutrition       Diet Orders   (From admission, onward)             Start     Ordered    11/23/22 1352  Diet Enteral/Parenteral; Tube Feeding No Oral Diet; Vital High Protein; Continuous; 65; 250; Water; Every 6 hours  Diet effective now        Comments: Start at 45mL/hr and increase by 10mL/hr q8 hours as tolerated to goal of 65mL/hr  References:    Nutrtion Support Algorithm Enteral vs  Parenteral   Question Answer Comment   Diet Type Enteral/Parenteral    Enteral/Parenteral Tube Feeding No Oral Diet    Tube Feeding Formula: Vital High Protein    Bolus/Cyclic/Continuous Continuous    Tube Feeding Goal Rate (mL/hr): 65    Tube Feeding flush (mL): 250    Water Flush type: Water    Water flush frequency: Every 6 hours    RD to adjust diet per protocol?  No        11/23/22 1353                    Active Medications  Scheduled Meds:  Current Facility-Administered Medications   Medication Dose Route Frequency Provider Last Rate   • acetaminophen  650 mg Oral Q6H [address] Katerin Kothari DO     • atorvastatin  40 mg Per NG Tube After Dinner Maggy Villatoro PA-C     • chlorhexidine  15 mL Mouth/Throat Q12H [address] GAURANG Acharya     • cyanocobalamin  500 mcg Per NG Tube Daily Maggy Villatoro PA-C     • DULoxetine  60 mg Oral Daily GAURANG Luke     • ertapenem  1,000 mg Intravenous Q24H Kennedi Gibson MD Stopped (06/53/88 1724)   • folic acid  1 mg Per NG Tube Daily GAURANG Reyes     • gabapentin  100 mg Oral BID GAURANG Reyes     • heparin (porcine)  5,000 Units Subcutaneous [facility] GAURANG Reyes     • HYDROmorphone  0 3 mg Intravenous Q3H PRN GAURANG Deluna     • insulin lispro  2-12 Units Subcutaneous Q6H [facility] GAURANG Reyes     • insulin regular  10 Units Subcutaneous Q6H Pro Jeffery MD     • naloxone  0 04 mg Intravenous Q1MIN PRN Karey Ge PA-C     • omeprazole (PRILOSEC) suspension 2 mg/mL  20 mg Per NG Tube Daily Before Breakfast Maggy Villatoro PA-C     • ondansetron  4 mg Intravenous Q4H PRN GAURANG Reyes     • oxyCODONE  5 mg Oral Q6H PRN Mansoor Wilkinson DO      Or   • oxyCODONE  10 mg Oral Q4H PRN Mansoor Bauerings DO     • oxyCODONE  5 mg Per NG Tube Q6H Katerin Kothari DO     • thiamine  100 mg Per NG Tube Daily GAURANG Acharya       Continuous Infusions:     PRN Meds:   HYDROmorphone, 0 3 mg, Q3H PRN  naloxone, 0 04 mg, Q1MIN PRN  ondansetron, 4 mg, Q4H PRN  oxyCODONE, 5 mg, Q6H PRN   Or  oxyCODONE, 10 mg, Q4H PRN        Allergies   Allergies   Allergen Reactions   • Shellfish-Derived Products - Food Allergy Anaphylaxis   • Erythromycin GI Intolerance     ---------------------------------------------------------------------------------------  Advance Directive and Living Will:      Power of :    POLST:    ---------------------------------------------------------------------------------------  Care Time Delivered:   No Critical Care time spent       GAURANG Reyes        Portions of the record may have been created with voice recognition software  Occasional wrong word or "sound a like" substitutions may have occurred due to the inherent limitations of voice recognition software    Read the chart carefully and recognize, using context, where substitutions have occurred

## 2022-11-25 LAB
ALBUMIN SERPL BCP-MCNC: 1.5 G/DL (ref 3.5–5)
ALP SERPL-CCNC: 436 U/L (ref 46–116)
ALT SERPL W P-5'-P-CCNC: 19 U/L (ref 12–78)
ANION GAP SERPL CALCULATED.3IONS-SCNC: 4 MMOL/L (ref 4–13)
AST SERPL W P-5'-P-CCNC: 67 U/L (ref 5–45)
ATRIAL RATE: 102 BPM
ATRIAL RATE: 98 BPM
BASOPHILS # BLD AUTO: 0.03 THOUSANDS/ÂΜL (ref 0–0.1)
BASOPHILS NFR BLD AUTO: 0 % (ref 0–1)
BILIRUB SERPL-MCNC: 1.93 MG/DL (ref 0.2–1)
BUN SERPL-MCNC: 30 MG/DL (ref 5–25)
CALCIUM ALBUM COR SERPL-MCNC: 9.9 MG/DL (ref 8.3–10.1)
CALCIUM SERPL-MCNC: 7.9 MG/DL (ref 8.3–10.1)
CHLORIDE SERPL-SCNC: 127 MMOL/L (ref 96–108)
CO2 SERPL-SCNC: 22 MMOL/L (ref 21–32)
CREAT SERPL-MCNC: 1.37 MG/DL (ref 0.6–1.3)
EOSINOPHIL # BLD AUTO: 0 THOUSAND/ÂΜL (ref 0–0.61)
EOSINOPHIL NFR BLD AUTO: 0 % (ref 0–6)
ERYTHROCYTE [DISTWIDTH] IN BLOOD BY AUTOMATED COUNT: 16.4 % (ref 11.6–15.1)
GFR SERPL CREATININE-BSD FRML MDRD: 56 ML/MIN/1.73SQ M
GLUCOSE SERPL-MCNC: 218 MG/DL (ref 65–140)
GLUCOSE SERPL-MCNC: 225 MG/DL (ref 65–140)
GLUCOSE SERPL-MCNC: 227 MG/DL (ref 65–140)
GLUCOSE SERPL-MCNC: 229 MG/DL (ref 65–140)
GLUCOSE SERPL-MCNC: 235 MG/DL (ref 65–140)
GLUCOSE SERPL-MCNC: 266 MG/DL (ref 65–140)
HCT VFR BLD AUTO: 24.4 % (ref 36.5–49.3)
HGB BLD-MCNC: 7.1 G/DL (ref 12–17)
IMM GRANULOCYTES # BLD AUTO: 0.14 THOUSAND/UL (ref 0–0.2)
IMM GRANULOCYTES NFR BLD AUTO: 1 % (ref 0–2)
LYMPHOCYTES # BLD AUTO: 1.01 THOUSANDS/ÂΜL (ref 0.6–4.47)
LYMPHOCYTES NFR BLD AUTO: 9 % (ref 14–44)
MAGNESIUM SERPL-MCNC: 2.3 MG/DL (ref 1.6–2.6)
MCH RBC QN AUTO: 28.2 PG (ref 26.8–34.3)
MCHC RBC AUTO-ENTMCNC: 29.1 G/DL (ref 31.4–37.4)
MCV RBC AUTO: 97 FL (ref 82–98)
MONOCYTES # BLD AUTO: 1.08 THOUSAND/ÂΜL (ref 0.17–1.22)
MONOCYTES NFR BLD AUTO: 9 % (ref 4–12)
NEUTROPHILS # BLD AUTO: 9.56 THOUSANDS/ÂΜL (ref 1.85–7.62)
NEUTS SEG NFR BLD AUTO: 81 % (ref 43–75)
NRBC BLD AUTO-RTO: 0 /100 WBCS
P AXIS: 28 DEGREES
P AXIS: 41 DEGREES
PHOSPHATE SERPL-MCNC: 3.3 MG/DL (ref 2.7–4.5)
PLATELET # BLD AUTO: 527 THOUSANDS/UL (ref 149–390)
PMV BLD AUTO: 11.1 FL (ref 8.9–12.7)
POTASSIUM SERPL-SCNC: 4 MMOL/L (ref 3.5–5.3)
PR INTERVAL: 156 MS
PR INTERVAL: 171 MS
PROT SERPL-MCNC: 6.8 G/DL (ref 6.4–8.4)
QRS AXIS: -4 DEGREES
QRS AXIS: 14 DEGREES
QRSD INTERVAL: 136 MS
QRSD INTERVAL: 138 MS
QT INTERVAL: 379 MS
QT INTERVAL: 392 MS
QTC INTERVAL: 494 MS
QTC INTERVAL: 500 MS
RBC # BLD AUTO: 2.52 MILLION/UL (ref 3.88–5.62)
SODIUM SERPL-SCNC: 153 MMOL/L (ref 135–147)
T WAVE AXIS: -4 DEGREES
T WAVE AXIS: -7 DEGREES
VENTRICULAR RATE: 102 BPM
VENTRICULAR RATE: 98 BPM
WBC # BLD AUTO: 11.82 THOUSAND/UL (ref 4.31–10.16)

## 2022-11-25 RX ORDER — DEXTROSE MONOHYDRATE 50 MG/ML
50 INJECTION, SOLUTION INTRAVENOUS CONTINUOUS
Status: DISCONTINUED | OUTPATIENT
Start: 2022-11-25 | End: 2022-11-26

## 2022-11-25 RX ORDER — AMLODIPINE BESYLATE 5 MG/1
5 TABLET ORAL 2 TIMES DAILY
Status: DISCONTINUED | OUTPATIENT
Start: 2022-11-25 | End: 2022-12-03 | Stop reason: HOSPADM

## 2022-11-25 RX ORDER — PANTOPRAZOLE SODIUM 40 MG/10ML
40 INJECTION, POWDER, LYOPHILIZED, FOR SOLUTION INTRAVENOUS EVERY 12 HOURS SCHEDULED
Status: DISCONTINUED | OUTPATIENT
Start: 2022-11-25 | End: 2022-11-29

## 2022-11-25 RX ORDER — INSULIN GLARGINE 100 [IU]/ML
22 INJECTION, SOLUTION SUBCUTANEOUS
Status: DISCONTINUED | OUTPATIENT
Start: 2022-11-25 | End: 2022-11-26

## 2022-11-25 RX ORDER — OXYCODONE HCL 5 MG/5 ML
5 SOLUTION, ORAL ORAL EVERY 6 HOURS
Status: DISCONTINUED | OUTPATIENT
Start: 2022-11-25 | End: 2022-11-29

## 2022-11-25 RX ORDER — INSULIN LISPRO 100 [IU]/ML
5 INJECTION, SOLUTION INTRAVENOUS; SUBCUTANEOUS
Status: DISCONTINUED | OUTPATIENT
Start: 2022-11-25 | End: 2022-11-26

## 2022-11-25 RX ADMIN — OXYCODONE HYDROCHLORIDE 5 MG: 5 SOLUTION ORAL at 17:38

## 2022-11-25 RX ADMIN — OXYCODONE HYDROCHLORIDE 5 MG: 5 SOLUTION ORAL at 04:43

## 2022-11-25 RX ADMIN — DULOXETINE HYDROCHLORIDE 60 MG: 60 CAPSULE, DELAYED RELEASE ORAL at 10:07

## 2022-11-25 RX ADMIN — DEXTROSE 50 ML/HR: 5 SOLUTION INTRAVENOUS at 11:25

## 2022-11-25 RX ADMIN — QUETIAPINE FUMARATE 25 MG: 25 TABLET ORAL at 21:55

## 2022-11-25 RX ADMIN — AMLODIPINE BESYLATE 5 MG: 5 TABLET ORAL at 17:38

## 2022-11-25 RX ADMIN — GABAPENTIN 100 MG: 100 CAPSULE ORAL at 17:37

## 2022-11-25 RX ADMIN — PANTOPRAZOLE SODIUM 40 MG: 40 INJECTION, POWDER, FOR SOLUTION INTRAVENOUS at 11:30

## 2022-11-25 RX ADMIN — ACETAMINOPHEN 650 MG: 325 TABLET ORAL at 11:30

## 2022-11-25 RX ADMIN — INSULIN LISPRO 5 UNITS: 100 INJECTION, SOLUTION INTRAVENOUS; SUBCUTANEOUS at 17:40

## 2022-11-25 RX ADMIN — ATORVASTATIN CALCIUM 40 MG: 40 TABLET, FILM COATED ORAL at 17:36

## 2022-11-25 RX ADMIN — INSULIN LISPRO 2 UNITS: 100 INJECTION, SOLUTION INTRAVENOUS; SUBCUTANEOUS at 06:21

## 2022-11-25 RX ADMIN — INSULIN LISPRO 5 UNITS: 100 INJECTION, SOLUTION INTRAVENOUS; SUBCUTANEOUS at 11:36

## 2022-11-25 RX ADMIN — HEPARIN SODIUM 5000 UNITS: 5000 INJECTION INTRAVENOUS; SUBCUTANEOUS at 15:39

## 2022-11-25 RX ADMIN — FOLIC ACID 1 MG: 1 TABLET ORAL at 10:07

## 2022-11-25 RX ADMIN — INSULIN GLARGINE 22 UNITS: 100 INJECTION, SOLUTION SUBCUTANEOUS at 21:55

## 2022-11-25 RX ADMIN — INSULIN LISPRO 2 UNITS: 100 INJECTION, SOLUTION INTRAVENOUS; SUBCUTANEOUS at 11:36

## 2022-11-25 RX ADMIN — HEPARIN SODIUM 5000 UNITS: 5000 INJECTION INTRAVENOUS; SUBCUTANEOUS at 21:55

## 2022-11-25 RX ADMIN — INSULIN LISPRO 3 UNITS: 100 INJECTION, SOLUTION INTRAVENOUS; SUBCUTANEOUS at 17:39

## 2022-11-25 RX ADMIN — CHLORHEXIDINE GLUCONATE 15 ML: 1.2 SOLUTION ORAL at 21:55

## 2022-11-25 RX ADMIN — OXYCODONE HYDROCHLORIDE 5 MG: 5 SOLUTION ORAL at 06:13

## 2022-11-25 RX ADMIN — GABAPENTIN 100 MG: 100 CAPSULE ORAL at 10:07

## 2022-11-25 RX ADMIN — PANTOPRAZOLE SODIUM 40 MG: 40 TABLET, DELAYED RELEASE ORAL at 06:12

## 2022-11-25 RX ADMIN — OXYCODONE HYDROCHLORIDE 5 MG: 5 SOLUTION ORAL at 11:30

## 2022-11-25 RX ADMIN — THIAMINE HYDROCHLORIDE 100 MG: 100 INJECTION, SOLUTION INTRAMUSCULAR; INTRAVENOUS at 04:36

## 2022-11-25 RX ADMIN — HEPARIN SODIUM 5000 UNITS: 5000 INJECTION INTRAVENOUS; SUBCUTANEOUS at 06:12

## 2022-11-25 RX ADMIN — THIAMINE HYDROCHLORIDE 100 MG: 100 INJECTION, SOLUTION INTRAMUSCULAR; INTRAVENOUS at 11:25

## 2022-11-25 RX ADMIN — CHLORHEXIDINE GLUCONATE 15 ML: 1.2 SOLUTION ORAL at 10:08

## 2022-11-25 RX ADMIN — INSULIN LISPRO 5 UNITS: 100 INJECTION, SOLUTION INTRAVENOUS; SUBCUTANEOUS at 09:56

## 2022-11-25 RX ADMIN — OXYCODONE HYDROCHLORIDE 10 MG: 5 SOLUTION ORAL at 21:55

## 2022-11-25 RX ADMIN — PANTOPRAZOLE SODIUM 40 MG: 40 INJECTION, POWDER, FOR SOLUTION INTRAVENOUS at 21:58

## 2022-11-25 RX ADMIN — ACETAMINOPHEN 650 MG: 325 TABLET ORAL at 17:37

## 2022-11-25 RX ADMIN — ACETAMINOPHEN 650 MG: 325 TABLET ORAL at 06:12

## 2022-11-25 RX ADMIN — CYANOCOBALAMIN TAB 500 MCG 500 MCG: 500 TAB at 10:07

## 2022-11-25 RX ADMIN — CHLOROTHIAZIDE SODIUM 500 MG: 500 INJECTION, POWDER, LYOPHILIZED, FOR SOLUTION INTRAVENOUS at 15:38

## 2022-11-25 RX ADMIN — THIAMINE HYDROCHLORIDE 100 MG: 100 INJECTION, SOLUTION INTRAMUSCULAR; INTRAVENOUS at 21:58

## 2022-11-25 RX ADMIN — MELATONIN 6 MG: at 21:55

## 2022-11-25 NOTE — PHYSICAL THERAPY NOTE
PHYSICAL THERAPY NOTE          Patient Name: Gene TOBIAS Date: 11/25/2022 11/25/22 1425   PT Last Visit   PT Visit Date 11/25/22   Pain Assessment   Pain Assessment Tool [facility] Pain Intervention(s) Repositioned; Ambulation/increased activity; Emotional support   Pain Rating: FLACC (Rest) - Face 0   Pain Rating: FLACC (Rest) - Legs 0   Pain Rating: FLACC (Rest) - Activity 0   Pain Rating: FLACC (Rest) - Cry 0   Pain Rating: FLACC (Rest) - Consolability 0   Score: FLACC (Rest) 0   Pain Rating: FLACC (Activity) - Face 1   Pain Rating: FLACC (Activity) - Legs 0   Pain Rating: FLACC (Activity) - Activity 1   Pain Rating: FLACC (Activity) - Cry 1   Pain Rating: FLACC (Activity) - Consolability 1   Score: FLACC (Activity) 4   Restrictions/Precautions   Weight Bearing Precautions Per Order No   Other Precautions (S)  Contact/isolation;Cognitive;Multiple lines;Telemetry;O2;Fall Risk;Pain  (colostomy bag, wound vac, pt has signficant polyneuropathy in B UE, B feet )   General   Family/Caregiver Present No   Cognition   Overall Cognitive Status Impaired   Attention Attends with cues to redirect   Orientation Level Oriented to person;Oriented to situation;Disoriented to place; Disoriented to time   Memory Decreased recall of recent events   Following Commands Follows one step commands with increased time or repetition   Subjective   Subjective Pt agreeable to participate in therapy and says he has not sat in a chair in 3-4 days  Bed Mobility   Supine to Sit 2  Maximal assistance   Additional items Assist x 2;HOB elevated;Verbal cues; Increased time required;LE management   Sit to Supine Unable to assess   Additional Comments Pt sat EOB with - poor vs  zero trunk and postural control  Transfers   Sit to Stand 2  Maximal assistance   Additional items Assist x 3; Increased time required;Verbal cues   Stand to Sit 2 Maximal assistance   Additional items Assist x 3; Increased time required;Verbal cues   Stand pivot 2  Maximal assistance   Additional items Assist x 3; Increased time required;Verbal cues  (drop arm chair on pt R)   Additional Comments b/l HHA   Ambulation/Elevation   Gait pattern Not appropriate   Balance   Static Sitting Poor -   Dynamic Sitting Poor -   Static Standing Zero   Dynamic Standing Poor -   Ambulatory Zero   Endurance Deficit   Endurance Deficit Yes   Activity Tolerance   Activity Tolerance Patient limited by fatigue;Patient limited by pain   Medical Staff Made Aware PT CI, RAMON, nsg   Nurse Made Aware yes   Assessment   Prognosis Guarded   Problem List Decreased strength;Decreased range of motion;Decreased endurance; Impaired balance;Decreased mobility; Decreased coordination;Decreased cognition;Pain   Assessment Pt supine in bed upon arrival and nsg reported pt has been very lethargic  Pt tx session focused on bed mobility, transfers, desensitization, balance, and LE strengthening  Pt continues to all mobility with maxAx2 vs 3  Pt demonstrates poor dynamic and static balance EOB  Pt not appropriate for ambulation and a stand pivot to chair was required  Pt able to bear weight, but had significant neuropathic pain in B feet and B hands  Pt ended session seated in chair, all needs in reach  Pt progressing towards goals, but continues to be far from baseline  Pt would continue to benefit from inpatient services  Performed at least 2 patient identifiers during session: Name and Pinkie Heading The patient's AM-PAC Basic Mobility Inpatient Short Form Raw Score is 6  A Raw score of less than or equal to 16 suggests the patient may benefit from discharge to post-acute rehabilitation services  PT recommendation is for pt to be D/C  with post acute rehab services     Barriers to Discharge Inaccessible home environment;Decreased caregiver support   Goals   Patient Goals none stated   STG Expiration Date 12/07/22 Short Term Goal #1 1  Pt will improve bed mobility from MaxAx2 to ModAx1 to facilitate functional movement in bed  2  Pt will perform transfers with ModA to improve effectiveness, efficiency, and safe mobility throughout home  3  Pt will ambulate 25 ft under [address] [place] with AD to be able to ambulate to bathroom 4  Pt will improve static/dynamic balance to 5 minutes with ModA to be able to perform functional movements at EOB  5  Pt's strength will improve to 3/5 to be able to perform functional activities safely  Plan   Treatment/Interventions Functional transfer training;LE strengthening/ROM; Therapeutic exercise; Endurance training;Cognitive reorientation;Patient/family training;Equipment eval/education; Bed mobility;Gait training;Spoke to nursing;OT   PT Frequency 2-3x/wk   Recommendation   PT Discharge Recommendation Post acute rehabilitation services   AM-PAC Basic Mobility Inpatient   Turning in Bed Without Bedrails 1   Lying on Back to Sitting on Edge of Flat Bed 1   Moving Bed to Chair 1   Standing Up From Chair 1   Walk in Room 1   Climb 3-5 Stairs 1   Basic Mobility Inpatient Raw Score 6   Turning Head Towards Sound 3   Follow Simple Instructions 3   Low Function Basic Mobility Raw Score 12   Low Function Basic Mobility Standardized Score 18 33   Highest Level Of Mobility   JH-HLM Goal 2: Bed activities/Dependent transfer   JH-HLM Achieved 4: Move to chair/commode   Exercises   Balance training  Pt performed static balance EOB ~20-25 min   End of Consult   Patient Position at End of Consult Bedside chair; All needs within [facility]

## 2022-11-25 NOTE — PROGRESS NOTES
Progress Note - Catracho Ledesma 61 y o  male MRN: [id number]    Unit/Bed#: [facility] 205-01 Encounter: [id number]      Assessment:  62 y/o M w metastatic colon ca s/p transverse colectomy & partial hepatectomy 11/7/22 c/b anastomotic leak, ex lap, R hemicolectomy 11/16/22 and loop ileostomy 11/17       Vss  tmax 101 2 yesterday @ 22:00  Now improved w 98 5 w tylenol  Normotensive  Uop: 1675  Ostomy: 500  L VICTORINO: 38 cc serosang  RJP: no output  Vac midline, good seal, 50 cc output  Plan:  Continue diet, advance as tolerated   Midline vac change today  Remove R VICTORINO no output for days  Continue L VICTORINO to bulb suction  Monitor fever curve  Ambulate  dvt ppx  Needs improved BG control goal 140-180  Pt/ot  Pulmonary toilet    Subjective:   Feels about the same  Didn't each much yesterday  Reported walking short distance w pt  Wants to get out of bed today  Denied chest pain or shortness of breath  Objective:     Vitals: Blood pressure 146/71, pulse 90, temperature 98 7 °F (37 1 °C), temperature source Oral, resp  rate 18, height 5' 8" (1 727 m), weight 114 kg (251 lb 8 7 oz), SpO2 96 %  ,Body mass index is 38 25 kg/m²  Intake/Output Summary (Last 24 hours) at 11/25/2022 0612  Last data filed at 11/25/2022 2851  Gross per 24 hour   Intake 738 ml   Output 2263 ml   Net -1525 ml       Physical Exam  General: NAD  HEENT: NC/AT  MMM  Cv: RRR  Lungs: normal effort  Ab: Soft, NT/ND  Ostomy pink, functional, viable  L VICTORINO serosang  R VICTORION no output  Vac in place, good seal, no leak     Ex: no CCE  Neuro: AAOx3      Invasive Devices     Central Venous Catheter Line  Duration           Port A Cath 03/10/22 Right Chest 259 days          Peripheral Intravenous Line  Duration           Peripheral IV 11/23/22 Right Antecubital 2 days    Peripheral IV 11/24/22 Right;Upper;Ventral (anterior) Arm <1 day          Drain  Duration           Closed/Suction Drain Lateral;Right Abdomen 8 days    Closed/Suction Drain Left Abdomen Bulb 19 Fr  8 days    Ileostomy LUQ 7 days                Lab, Imaging and other studies: I have personally reviewed pertinent reports      VTE Pharmacologic Prophylaxis: Sequential compression device (Venodyne)   VTE Mechanical Prophylaxis: sequential compression device

## 2022-11-25 NOTE — PROGRESS NOTES
Progress Note - Infectious Disease   Rd Quintanilla 61 y o  male MRN: [id number]  Unit/Bed#: [facility], [place] 205-01 Encounter: [id number]      Impression/Recommendations:  1  Sepsis, secondary to intra-abdominal infection and bacteremia   Patient is clinically much improved on antibiotic and status post I&D/bowel resection   WBC is much decreased, near normal   Temperature has been down, except for 1 episode of low-grade fever overnight  Patient completed antibiotic course  Observe off antibiotic  Monitor temperature/WBC  Monitor hemodynamics      2  ESBL producing E coli bacteremia, likely secondary to gut translocation during recent colon surgery   Patient is clinically improved   Bacteremia cleared   Patient completed antibiotic course  No further antibiotic needed for this      3  Anastomotic leak up with intra-abdominal abscess/infected hematoma   Patient is status post exploratory laparotomy, with drainage of abscess and cecum resection   Operative culture growing ESBL producing E coli again, with lactobacillus   Patient had repeat ex lap, with healthy-appearing bowel   Repeat CT with resolved intra-abdominal collections  Patient completed antibiotic course  Observe off for antibiotic  Wound care per surgery      4  Metastatic colon cancer, with liver metastases   Patient is status post transverse colon resection  Surgical oncology follow-up      5  ALEXY, superimposed on CKD   Creatinine worsen postop but improving again  Monitor creatinine       6  Acute hypoxic respiratory failure, previously on ventilator   Patient was extubated but now intubated again postop   No clinical pneumonia  Monitor respiratory status      7  Encephalopathy, multifactorial   Mental status has normalized  Monitor mental status      8  Type 2 DM      Discussed with patient in detail regarding the above plan      Antibiotics:  Off antibiotic     Subjective:  Patient is out of ICU  He is comfortable    Minimal abdominal pain   Temperature stays down  No chills  He is tolerating antibiotic well   No nausea, vomiting or diarrhea      Objective:  Vitals:  Temp:  [97 5 °F (36 4 °C)-101 2 °F (38 4 °C)] 98 7 °F (37 1 °C)  HR:  [] 92  Resp:  [18-44] 18  BP: (130-175)/(71-87) 148/75  SpO2:  [95 %-99 %] 95 %  Temp (24hrs), Av 9 °F (37 2 °C), Min:97 5 °F (36 4 °C), Max:101 2 °F (38 4 °C)  Current: Temperature: 98 7 °F (37 1 °C)    Physical Exam:     General: Awake, alert, cooperative, no distress  Neck:  Supple  No mass  No lymphadenopathy  Lungs: Expansion symmetric, no rales, no wheezing, respirations unlabored  Heart:  Regular rate and rhythm, S1 and S2 normal, no murmur  Abdomen: Soft, mild distention  Wound with VAC, with minimal drainage and no purulence  No erythema/warmth  Minimal tenderness  Extremities: Improving leg edema  No erythema/warmth  No ulcer  Nontender to palpation  Skin:  No rash  Neuro: Moves all extremities  Invasive Devices     Central Venous Catheter Line  Duration           Port A Cath 03/10/22 Right Chest 259 days          Peripheral Intravenous Line  Duration           Peripheral IV 22 Right Antecubital 2 days    Peripheral IV 22 Right;Upper;Ventral (anterior) Arm <1 day          Drain  Duration           Closed/Suction Drain Lateral;Right Abdomen 8 days    Closed/Suction Drain Left Abdomen Bulb 19 Fr  8 days    Ileostomy LUQ 7 days                Labs studies:   I have personally reviewed pertinent labs    Results from last 7 days   Lab Units 22  0459 22  1436 22  0517 22  0602 22  0500   POTASSIUM mmol/L 4 0 4 0 4 0   < > 3 8   CHLORIDE mmol/L 127* 125* 124*   < > 110*   CO2 mmol/L 22 25 25   < > 22   BUN mg/dL 30* 33* 36*   < > 56*   CREATININE mg/dL 1 37* 1 39* 1 42*   < > 2 93*   EGFR ml/min/1 73sq m 56 55 54   < > 22   CALCIUM mg/dL 7 9* 8 0* 7 8*   < > 8 2*   AST U/L 67*  --  68*  --  58*   ALT U/L 19  --  19  --  12   ALK PHOS U/L 436*  --  467*  --  141*    < > = values in this interval not displayed  Results from last 7 days   Lab Units 11/25/22  0459 11/24/22  0517 11/23/22  0531   WBC Thousand/uL 11 82* 12 56* 12 78*   HEMOGLOBIN g/dL 7 1* 7 2* 8 5*   PLATELETS Thousands/uL 527* 496* 532*           Imaging Studies:   I have personally reviewed pertinent imaging study reports and images in PACS  EKG, Pathology, and Other Studies:   I have personally reviewed pertinent reports

## 2022-11-25 NOTE — QUICK NOTE
Wound care  11/25/22  9:20AM    Time out called for wound vac change with nursing staff present  One black sponge removed from midline wound  Wound was with clean base and nice forming granulation tissue  No infection or purulence  One black sponge applied to length of wound and vac adhesive applied  Wound vac set to 125 mmhg continuous suction  There was a good seal and no leak  Pt tolerated w/o issues               Bhavin Dobbs MD

## 2022-11-25 NOTE — PLAN OF CARE
Problem: Potential for Falls  Goal: Patient will remain free of falls  Description: INTERVENTIONS:  - Educate patient/family on patient safety including physical limitations  - Instruct patient to call for assistance with activity   - Consult OT/PT to assist with strengthening/mobility   - Keep Call bell within reach  - Keep bed low and locked with side rails adjusted as appropriate  - Keep care items and personal belongings within reach  - Initiate and maintain comfort rounds  - Make Fall Risk Sign visible to staff  - Offer Toileting every  Hours, in advance of need  - Initiate/Maintain alarm  - Obtain necessary fall risk management equipment:   - Apply yellow socks and bracelet for high fall risk patients  - Consider moving patient to room near nurses station  Outcome: Progressing     Problem: Nutrition/Hydration-ADULT  Goal: Nutrient/Hydration intake appropriate for improving, restoring or maintaining nutritional needs  Description: Monitor and assess patient's nutrition/hydration status for malnutrition  Collaborate with interdisciplinary team and initiate plan and interventions as ordered  Monitor patient's weight and dietary intake as ordered or per policy  Utilize nutrition screening tool and intervene as necessary  Determine patient's food preferences and provide high-protein, high-caloric foods as appropriate       INTERVENTIONS:  - Monitor oral intake, urinary output, labs, and treatment plans  - Assess nutrition and hydration status and recommend course of action  - Evaluate amount of meals eaten  - Assist patient with eating if necessary   - Allow adequate time for meals  - Recommend/ encourage appropriate diets, oral nutritional supplements, and vitamin/mineral supplements  - Order, calculate, and assess calorie counts as needed  - Recommend, monitor, and adjust tube feedings and TPN/PPN based on assessed needs  - Assess need for intravenous fluids  - Provide specific nutrition/hydration education as appropriate  - Include patient/family/caregiver in decisions related to nutrition  Outcome: Progressing     Problem: SAFETY ADULT  Goal: Patient will remain free of falls  Description: INTERVENTIONS:  - Educate patient/family on patient safety including physical limitations  - Instruct patient to call for assistance with activity   - Consult OT/PT to assist with strengthening/mobility   - Keep Call bell within reach  - Keep bed low and locked with side rails adjusted as appropriate  - Keep care items and personal belongings within reach  - Initiate and maintain comfort rounds  - Make Fall Risk Sign visible to staff  - Offer Toileting every  Hours, in advance of need  - Initiate/Maintain alarm  - Obtain necessary fall risk management equipmen  - Apply yellow socks and bracelet for high fall risk patients  - Consider moving patient to room near nurses station  Outcome: Progressing     Problem: RESPIRATORY - ADULT  Goal: Achieves optimal ventilation and oxygenation  Description: INTERVENTIONS:  - Assess for changes in respiratory status  - Assess for changes in mentation and behavior  - Position to facilitate oxygenation and minimize respiratory effort  - Oxygen administered by appropriate delivery if ordered  - Initiate smoking cessation education as indicated  - Encourage broncho-pulmonary hygiene including cough, deep breathe, Incentive Spirometry  - Assess the need for suctioning and aspirate as needed  - Assess and instruct to report SOB or any respiratory difficulty  - Respiratory Therapy support as indicated  Outcome: Progressing     Problem: GASTROINTESTINAL - ADULT  Goal: Minimal or absence of nausea and/or vomiting  Description: INTERVENTIONS:  - Administer IV fluids if ordered to ensure adequate hydration  - Maintain NPO status until nausea and vomiting are resolved  - Nasogastric tube if ordered  - Administer ordered antiemetic medications as needed  - Provide nonpharmacologic comfort measures as appropriate  - Advance diet as tolerated, if ordered  - Consider nutrition services referral to assist patient with adequate nutrition and appropriate food choices  Outcome: Progressing     Problem: GENITOURINARY - ADULT  Goal: Maintains or returns to baseline urinary function  Description: INTERVENTIONS:  - Assess urinary function  - Encourage oral fluids to ensure adequate hydration if ordered  - Administer IV fluids as ordered to ensure adequate hydration  - Administer ordered medications as needed  - Offer frequent toileting  - Follow urinary retention protocol if ordered  Outcome: Progressing     Problem: METABOLIC, FLUID AND ELECTROLYTES - ADULT  Goal: Electrolytes maintained within normal limits  Description: INTERVENTIONS:  - Monitor labs and assess patient for signs and symptoms of electrolyte imbalances  - Administer electrolyte replacement as ordered  - Monitor response to electrolyte replacements, including repeat lab results as appropriate  - Instruct patient on fluid and nutrition as appropriate  Outcome: Progressing

## 2022-11-25 NOTE — PROGRESS NOTES
Progress note - Palliative and Supportive Care   Karlee Moreau 61 y o  male [id number]    Patient Active Problem List   Diagnosis   • Type 2 diabetes mellitus without complication, with long-term current use of insulin (HCC)   • Benign essential hypertension   • Mixed hyperlipidemia   • Erectile dysfunction   • Low testosterone   • Obesity (BMI 30-39  9)   • Testicular hypogonadism   • Incarcerated umbilical hernia   • Left ureteral calculus   • Type 2 diabetes mellitus with hyperlipidemia (HCC)   • Colonic mass   • Microcytic anemia   • Hypokalemia   • Transaminitis   • Thrombocytosis   • Iron deficiency anemia, unspecified   • Malignant neoplasm of transverse colon (HCC)   • 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)   • Colon cancer metastasized to liver [facility])   • Colostomy prolapse (HCC)   • Other fatigue   • Cervical radiculopathy   • Encephalopathy   • Flash pulmonary edema (HCC)   • MR (mitral regurgitation)   • Bacteremia   • ESBL (extended spectrum beta-lactamase) producing bacteria infection   • Acute respiratory failure with hypoxia (HCC)     Active issues specifically addressed today include:    - metastatic colon CA s/p transverse colectomy [11/07] c/b anastomotic leak s/p R hemicolectomy[11/16] with loop ileostomy [11/17]   - sepsis 2/2 intraabdominal infection + ESBL bacteremia   - ALEXY on CKD   - cancer-associated pain   - palliative care patient    Plan:  #symptom management  #cancer-related pain  #post-operative pain   - continue APAP 650 mg PO Q6H [address]   - max daily 4000 mg   - continue duloxetine 60 mg PO QDaily   - continue gabapentin 100 mg PO BID   - continue oxy-IR solution 5 mg PO Q6H [address]   - continue oxy-IR solution 5-10 mg PO Q4H PRN   - continue hydromorphone 0 3 mg IV Q3H PRN   - total OME usage yesterday: 22 5 mg    #nausea   - continue ondansetron 4 mg IV Q4H PRN    #insomnnia   - continue melatonin 6 mg PO QHS   - continue seroquel 25 mg PO QHS    #psychosocial support   - emotional support provided   - Dre Middleton,  20 years] [phone]   - two children              - Adelia Duranx, aged 13]              - Rylee [daughter, aged 12]   - current Luke Mantilla actor, 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 [address] \[employer] [facility] [place] [facility]\"" background      We appreciate the opportunity to participate in this patient's care  We will continue to follow  Please do not hesitate to contact our on-call provider through our clinic answering service at [phone] should you have acute symptom control concerns  Code Status: Full Code - Level 1   Decisional apparatus:  Patient is competent on my exam today  If competence is lost, patient's substitute decision maker would default to spouse by [place]     Advance Directive / Living Will / POLST:  Not on File, not previously completed    Interval history:   - fever overnight, otherwise, NAEO    MEDICATIONS / ALLERGIES:     current meds:   Current Facility-Administered Medications   Medication Dose Route Frequency   • acetaminophen (TYLENOL) tablet 650 mg  650 mg Oral Q6H [facility]   • atorvastatin (LIPITOR) tablet 40 mg  40 mg Per NG Tube After Dinner   • chlorhexidine (PERIDEX) 0 12 % oral rinse 15 mL  15 mL Mouth/Throat Q12H [facility]   • cyanocobalamin (VITAMIN B-12) tablet 500 mcg  500 mcg Per NG Tube Daily   • dextrose 5 % infusion  50 mL/hr Intravenous Continuous   • DULoxetine (CYMBALTA) delayed release capsule 60 mg  60 mg Oral Daily   • folic acid (FOLVITE) tablet 1 mg  1 mg Per NG Tube Daily   • gabapentin (NEURONTIN) capsule 100 mg  100 mg Oral BID   • heparin (porcine) subcutaneous injection 5,000 Units  5,000 Units Subcutaneous Q8H [facility]   • HYDROmorphone (DILAUDID) injection 0 3 mg  0 3 mg Intravenous Q3H PRN   • insulin glargine (LANTUS) subcutaneous injection 15 Units 0 15 mL  15 Units Subcutaneous HS   • insulin lispro (HumaLOG) 100 units/mL subcutaneous injection 1-6 Units  1-6 Units Subcutaneous TID AC   • insulin lispro (HumaLOG) 100 units/mL subcutaneous injection 5 Units  5 Units Subcutaneous TID With Meals   • melatonin tablet 6 mg  6 mg Oral HS   • naloxone (NARCAN) 0 04 mg/mL syringe 0 04 mg  0 04 mg Intravenous Q1MIN PRN   • ondansetron (ZOFRAN) injection 4 mg  4 mg Intravenous Q4H PRN   • oxyCODONE (ROXICODONE) oral solution 5 mg  5 mg Oral Q4H PRN    Or   • oxyCODONE (ROXICODONE) oral solution 10 mg  10 mg Oral Q4H PRN   • oxyCODONE (ROXICODONE) oral solution 5 mg  5 mg Per NG Tube Q6H   • pantoprazole (PROTONIX) injection 40 mg  40 mg Intravenous Q12H [address]   • QUEtiapine (SEROquel) tablet 25 mg  25 mg Oral HS   • thiamine (VITAMIN B1) 100 mg in sodium chloride 0 9 % 50 mL IVPB  100 mg Intravenous Q8H       Allergies   Allergen Reactions   • Shellfish-Derived Products - Food Allergy Anaphylaxis   • Erythromycin GI Intolerance       OBJECTIVE:    Physical Exam  Physical Exam  Vitals and nursing note reviewed  Constitutional:       General: He is awake  Appearance: He is not diaphoretic  Comments: Lying in bed in NAD  BMI 38 3  Non-toxic appearing   HENT:      Head: Normocephalic and atraumatic  Right Ear: External ear normal       Left Ear: External ear normal    Eyes:      Comments: No gaze preference   Cardiovascular:      Rate and Rhythm: Tachycardia present  Pulmonary:      Effort: No tachypnea, accessory muscle usage or respiratory distress  Comments: Completes full sentences without difficulty  Musculoskeletal:      Cervical back: Normal range of motion  Neurological:      General: No focal deficit present  Mental Status: He is alert and oriented to person, place, and time  Psychiatric:         Attention and Perception: Attention normal          Mood and Affect: Mood and affect normal          Speech: Speech normal          Cognition and Memory: Cognition and memory normal          Lab Results: I have personally reviewed pertinent labs  , CBC:   Lab Results   Component Value Date    WBC 11 82 (H) 11/25/2022 HGB 7 1 (L) 11/25/2022    HCT 24 4 (L) 11/25/2022    MCV 97 11/25/2022     (H) 11/25/2022    MCH 28 2 11/25/2022    MCHC 29 1 (L) 11/25/2022    RDW 16 4 (H) 11/25/2022    MPV 11 1 11/25/2022    NRBC 0 11/25/2022   , CMP:   Lab Results   Component Value Date    SODIUM 153 (H) 11/25/2022    K 4 0 11/25/2022     (H) 11/25/2022    CO2 22 11/25/2022    BUN 30 (H) 11/25/2022    CREATININE 1 37 (H) 11/25/2022    CALCIUM 7 9 (L) 11/25/2022    AST 67 (H) 11/25/2022    ALT 19 11/25/2022    ALKPHOS 436 (H) 11/25/2022    EGFR 56 11/25/2022   , PT/PTT:No results found for: PT, PTT  Imaging Studies: Reviewed  EKG, Pathology, and Other Studies: Reviewed    Counseling / Coordination of Care    Total floor / unit time spent today 15 minutes  Greater than 50% of total time was spent with the patient and / or family counseling and / or coordination of care  A description of the counseling / coordination of care: provided medical updates, discussed palliative care, determined competency, determined goals of care, determined POA, determined social/family support, discussed plans of care, discussed symptom management, provided psychosocial support   Coordinated plan of care with primary team

## 2022-11-25 NOTE — SPEECH THERAPY NOTE
Speech/Language Pathology Progress Note    Patient Name: Savage Holloway  [id number] Date: 11/25/2022    Subjective:  Pt was lethargic today  He refused breakfast but was willing to eat some of his lunch  Objective:  Pt seen for dysphagia therapy  Current diet is puree with thin liquids  Pt ate about 1/2 of his mashed potatoes, fed by SLP  Bolus formation and transfer were slow but adequate  Sips of water were offered between bites via straw and tolerated without s/s aspiration  Pt refused to try the pureed chicken  He was offered rudolph crackers for trial of solid food  Breakdown was adequate however bolus formation was reduced with diffuse residual  This improved given cueing and liquid wash  Assessment:  Slight diet advancement may improve pt's intake by allowing more food options  Supervision and assistance needed for meals  Plan/Recommendations:  Advance diet to dysphagia 2 mech soft with thin liquid  Assist with meals, alternate liquids and solids  ST will continue to follow

## 2022-11-25 NOTE — PROGRESS NOTES
Progress Note - Surgical Oncology   Madison Andrea 61 y o  male MRN: [id number]  Unit/Bed#: [facility] 205-01 Encounter: [id number]    Assessment:  62 y/o M w metastatic colon ca s/p transverse colectomy & partial hepatectomy 11/7/22 c/b anastomotic leak, ex lap, R hemicolectomy 11/16/22 and loop ileostomy 11/17       Afebrile 24 hrs  On room air  Urine 950  Stool 720  VICTORINO 20 from 38  Vac 100 from 50    WBC 11 from 11  Hgb 7 5 from 7 1  Cr 1 39 from 1 37    Endo on board: lantus 22  PT rehab  ID: stop abx      Plan:  Continue dysphagia diet  Consider neurology consult for altered mental status  Midline vac change MWF  Continue L VICTORINO to bulb suction  Monitor fever curve  Ambulate  dvt ppx  Needs improved BG control goal 140-180  Pt/ot  Pulmonary toilet        Subjective/Objective     Subjective:   No acute events overnight  Some confusion  A and O x 2  -N  -V  Ostomy with function  Objective:     Blood pressure 154/74, pulse 92, temperature 97 5 °F (36 4 °C), temperature source Oral, resp  rate 18, height 5' 8" (1 727 m), weight 114 kg (251 lb 8 7 oz), SpO2 96 %  ,Body mass index is 38 25 kg/m²        Intake/Output Summary (Last 24 hours) at 11/25/2022 1707  Last data filed at 11/25/2022 1651  Gross per 24 hour   Intake 270 ml   Output 2393 ml   Net -2123 ml       Invasive Devices     Central Venous Catheter Line  Duration           Port A Cath 03/10/22 Right Chest 260 days          Peripheral Intravenous Line  Duration           Peripheral IV 11/23/22 Right Antecubital 2 days    Peripheral IV 11/24/22 Right;Upper;Ventral (anterior) Arm 1 day          Drain  Duration           Closed/Suction Drain Lateral;Right Abdomen 9 days    Closed/Suction Drain Left Abdomen Bulb 19 Fr  9 days    Ileostomy LUQ 8 days                Physical Exam:   Gen: NAD, Comfortable  Neuro: A&O, No focal deficits  Head: Normal Cephalic, Atraumatic  Eye: EOMI, PERRLA, No scleral icterus  Neck: Supple, No JVD, Midline trachea  CV: RRR, Cap refill <2 sec  Pulm: Normal work of breathing, no respiratory distress  Abd: non tender, pstomy with output  Ext: No edema, Non-tender  Skin: warm, dry, intact

## 2022-11-25 NOTE — PLAN OF CARE
Problem: PHYSICAL THERAPY ADULT  Goal: Performs mobility at highest level of function for planned discharge setting  See evaluation for individualized goals  Description: Treatment/Interventions: ADL retraining, Functional transfer training, LE strengthening/ROM, Endurance training, Patient/family training, Equipment eval/education, Bed mobility, Gait training, Spoke to nursing, Spoke to case management          See flowsheet documentation for full assessment, interventions and recommendations  Outcome: Progressing  Note: Prognosis: Guarded  Problem List: Decreased strength, Decreased range of motion, Decreased endurance, Impaired balance, Decreased mobility, Decreased coordination, Decreased cognition, Pain  Assessment: Pt supine in bed upon arrival and nsg reported pt has been very lethargic  Pt tx session focused on bed mobility, transfers, desensitization, balance, and LE strengthening  Pt continues to all mobility with maxAx2 vs 3  Pt demonstrates poor dynamic and static balance EOB  Pt not appropriate for ambulation and a stand pivot to chair was required  Pt able to bear weight, but had significant neuropathic pain in B feet and B hands  Pt ended session seated in chair, all needs in reach  Pt progressing towards goals, but continues to be far from baseline  Pt would continue to benefit from inpatient services  Performed at least 2 patient identifiers during session: Name and Gwen Gurpreet The patient's AM-PAC Basic Mobility Inpatient Short Form Raw Score is 6  A Raw score of less than or equal to 16 suggests the patient may benefit from discharge to post-acute rehabilitation services  PT recommendation is for pt to be D/C  with post acute rehab services  Barriers to Discharge: Inaccessible home environment, Decreased caregiver support     PT Discharge Recommendation: Post acute rehabilitation services    See flowsheet documentation for full assessment

## 2022-11-25 NOTE — QUICK NOTE
Surgery  Quick note    R VICTORINO taken off of bulb suction  The drain was removed w/o issues  Sterile guaze applied  Pt tolerated w/o issues       Babak Swann MD  9:26 AM  11/25/22

## 2022-11-25 NOTE — OCCUPATIONAL THERAPY NOTE
Occupational Therapy Treatment Note       11/25/22 1444   OT Last Visit   OT Visit Date 11/25/22   Note Type   Note Type Treatment   Pain Assessment   Pain Assessment Tool 0-10   Pain Rating: FLACC (Rest) - Face 1   Pain Rating: FLACC (Rest) - Legs 0   Pain Rating: FLACC (Rest) - Activity 0   Pain Rating: FLACC (Rest) - Cry 0   Pain Rating: FLACC (Rest) - Consolability 0   Score: FLACC (Rest) 1   Pain Rating: FLACC (Activity) - Face 1   Pain Rating: FLACC (Activity) - Legs 0   Pain Rating: FLACC (Activity) - Activity 0   Pain Rating: FLACC (Activity) - Cry 0   Pain Rating: FLACC (Activity) - Consolability 0   Score: FLACC (Activity) 1   Restrictions/Precautions   Weight Bearing Precautions Per Order No   Lifestyle   Autonomy I w/ ADLS/IADLS, transfers and functional mobility PTA   Reciprocal Relationships Pt lives w/ his spouse and kids   Service to Others on SSD   Intrinsic Gratification Does [employer]; was [employer] in the [facility] for the past 20 years; goes on tour  ADL   Where Assessed Edge of bed   Grooming Assistance 2  Maximal Assistance   Grooming Deficit Teeth care   Grooming Comments hand over hand assist to initiate and complete 2* pt R UE swelling in hand and poor muscle strength  Bed Mobility   Supine to Sit 2  Maximal assistance   Additional items Assist x 2   Therapeutic Excerise-Strength   UE Strength Yes   Right Upper Extremity- Strength   R Shoulder Flexion; Horizontal ABduction; Extension   R Elbow Elbow flexion;Elbow extension   R Wrist Wrist flexion;Wrist extension   R Hand Thumb; Index finger; Long finger;Ring finger;Little finger   R Position Seated   Left Upper Extremity-Strength   L Shoulder Flexion; Extension;Horizontal ABduction   L Elbow Elbow flexion;Elbow extension   L Wrist Wrist flexion;Wrist extension   L Hand Thumb; Index finger; Long finger;Ring finger;Little finger   Subjective   Subjective Pt stated " I  am getting weak" pt reported feeling that he was lossing strength while sitting EOB   Cognition   Overall Cognitive Status Impaired   Arousal/Participation Responsive   Attention Attends with cues to redirect   Orientation Level Oriented to person;Disoriented to place; Disoriented to time;Oriented to situation   Memory Unable to assess   Following Commands Follows one step commands with increased time or repetition   Activity Tolerance   Activity Tolerance Patient limited by fatigue;Treatment limited secondary to medical complications (Comment)   Assessment   Assessment Pt seen for participation occupational therapy session with focus on activity tolerance, bed mob, sitting balance and tolerance for pt engagement in self-care tasks and bilateral UE AROM/AAROM for decreased swelling and increased function  Pt cleared by RN/Bernie for pt participation in OT session  Pt presented supine/HOB raised pt awake to name called  Pt identifiers confirmed  Pt reported he was feeling fatigued and was noted to be awake with eyes closed  Pt did not report his therapy goal the session  He required assistance x2 for bed mob 2* to decreased overall strength  Pt reported pain on bottoms of feet and in b/l UE hands  He was noted to wince if hands or feet lightly touched  Pt instructed however on Bl/UE AROM and encouraged to use hands functionally  Pt required assist to hold toothbrush and brush teeth however pt was able to hold cup to mouth with hand over hand assist to initiate  Pt will require post acute rehab service to continue to address noted pt deficit which currently impair pt ADL and functional mob  Pt remained out of bed to bedside chair post session all needs within reach     Plan   Treatment Interventions ADL retraining   Goal Expiration Date 12/07/22   OT Treatment Day 2   Recommendation   OT Discharge Recommendation Post acute rehabilitation services   AM-PAC Daily Activity Inpatient   Lower Body Dressing 1   Bathing 1   Toileting 1   Upper Body Dressing 2   Grooming 2   Eating 3   Daily Activity Raw Score 10   AM-PAC Applied Cognition Inpatient   Following a Speech/Presentation 3   Understanding Ordinary Conversation 4   Taking Medications 3   Remembering Where Things Are Placed or Put Away 3   Remembering List of 4-5 Errands 2   Taking Care of Complicated Tasks 2   Applied Cognition Raw Score 17   Applied Cognition Standardized Score 36 52     Elvin Fast  RAMON/L

## 2022-11-25 NOTE — PROGRESS NOTES
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 who presented to the hospital for ex lap, transverse colon resection, segment 3 liver resection, ablation, reversal of colostomy on 11/07, hospital course complicated with left upper quadrant hematoma and ESBL bacteremia   Endocrinology consulted for the management of type 2 diabetes mellitus in the setting of liver metastases  Currently pt off of tube feeds    Objective:     Vitals: Blood pressure 147/77, pulse 100, temperature 98 5 °F (36 9 °C), temperature source Oral, resp  rate 18, height 5' 8" (1 727 m), weight 114 kg (251 lb 8 7 oz), SpO2 96 %  ,Body mass index is 38 25 kg/m²        Intake/Output Summary (Last 24 hours) at 11/25/2022 1427  Last data filed at 11/25/2022 0608  Gross per 24 hour   Intake 170 ml   Output 1293 ml   Net -1123 ml       Physical Exam:  General Appearance: awake, appears stated age and cooperative  Head: Normocephalic, without obvious abnormality, atraumatic  Extremities: moves all extremities  Skin: Skin color and temperature normal    Pulm: no labored breathing      POC Glucose (mg/dl)   Date Value   11/25/2022 218 (H)   11/25/2022 229 (H)   11/25/2022 225 (H)   11/24/2022 235 (H)   11/24/2022 238 (H)   11/24/2022 296 (H)   11/23/2022 272 (H)   11/23/2022 290 (H)   11/23/2022 252 (H)   11/23/2022 274 (H)       Assessment and plan:     Type 2 diabetes mellitus with hyperglycemia  HbA1c 8 0 September 2022  Home regimen:  Januvia 100 mg daily, metformin glyburide 5-500 mg QD, CGM Parrish  Inpatient regimen:   Lantus 10 units at bedtime, correctional scale algo with meals and at bedtime     Recommendations:  Currently pt is off of tube feeds  Pt reports good appetite, tolerating PO  Recommend to increase Lantus dose from 15 to 22 units bedtime  Recommend to start lispro 5 units TID with meals + correctional   Continue with accu checks        Portions of the record may have been created with voice recognition software  - - -

## 2022-11-25 NOTE — PLAN OF CARE
Problem: OCCUPATIONAL THERAPY ADULT  Goal: Performs self-care activities at highest level of function for planned discharge setting  See evaluation for individualized goals  Description: Treatment Interventions: ADL retraining, Functional transfer training, UE strengthening/ROM, Endurance training, Patient/family training, Equipment evaluation/education, Compensatory technique education, Continued evaluation, Energy conservation, Activityengagement          See flowsheet documentation for full assessment, interventions and recommendations  Outcome: Progressing  Note: Limitation: Decreased ADL status, Decreased UE ROM, Decreased UE strength, Decreased Safe judgement during ADL, Decreased cognition, Decreased endurance, Decreased self-care trans, Decreased high-level ADLs  Prognosis: Fair  Assessment: Pt seen for participation occupational therapy session with focus on activity tolerance, bed mob, sitting balance and tolerance for pt engagement in self-care tasks and bilateral UE AROM/AAROM for decreased swelling and increased function  Pt cleared by RN/Bernie for pt participation in OT session  Pt presented supine/HOB raised pt awake to name called  Pt identifiers confirmed  Pt reported he was feeling fatigued and was noted to be awake with eyes closed  Pt did not report his therapy goal the session  He required assistance x2 for bed mob 2* to decreased overall strength  Pt reported pain on bottoms of feet and in b/l UE hands  He was noted to wince if hands or feet lightly touched  Pt instructed however on Bl/UE AROM and encouraged to use hands functionally  Pt required assist to hold toothbrush and brush teeth however pt was able to hold cup to mouth with hand over hand assist to initiate  Pt will require post acute rehab service to continue to address noted pt deficit which currently impair pt ADL and functional mob  Pt remained out of bed to bedside chair post session all needs within reach       OT Discharge Recommendation: Post acute rehabilitation services

## 2022-11-26 LAB
ANION GAP SERPL CALCULATED.3IONS-SCNC: 4 MMOL/L (ref 4–13)
BASOPHILS # BLD AUTO: 0.05 THOUSANDS/ÂΜL (ref 0–0.1)
BASOPHILS NFR BLD AUTO: 1 % (ref 0–1)
BUN SERPL-MCNC: 27 MG/DL (ref 5–25)
CALCIUM SERPL-MCNC: 8.2 MG/DL (ref 8.3–10.1)
CHLORIDE SERPL-SCNC: 121 MMOL/L (ref 96–108)
CO2 SERPL-SCNC: 23 MMOL/L (ref 21–32)
CREAT SERPL-MCNC: 1.39 MG/DL (ref 0.6–1.3)
EOSINOPHIL # BLD AUTO: 0 THOUSAND/ÂΜL (ref 0–0.61)
EOSINOPHIL NFR BLD AUTO: 0 % (ref 0–6)
ERYTHROCYTE [DISTWIDTH] IN BLOOD BY AUTOMATED COUNT: 16.5 % (ref 11.6–15.1)
GFR SERPL CREATININE-BSD FRML MDRD: 55 ML/MIN/1.73SQ M
GLUCOSE SERPL-MCNC: 101 MG/DL (ref 65–140)
GLUCOSE SERPL-MCNC: 105 MG/DL (ref 65–140)
GLUCOSE SERPL-MCNC: 115 MG/DL (ref 65–140)
GLUCOSE SERPL-MCNC: 125 MG/DL (ref 65–140)
GLUCOSE SERPL-MCNC: 169 MG/DL (ref 65–140)
GLUCOSE SERPL-MCNC: 278 MG/DL (ref 65–140)
GLUCOSE SERPL-MCNC: 283 MG/DL (ref 65–140)
GLUCOSE SERPL-MCNC: 285 MG/DL (ref 65–140)
HCT VFR BLD AUTO: 25.5 % (ref 36.5–49.3)
HGB BLD-MCNC: 7.5 G/DL (ref 12–17)
IMM GRANULOCYTES # BLD AUTO: 0.14 THOUSAND/UL (ref 0–0.2)
IMM GRANULOCYTES NFR BLD AUTO: 1 % (ref 0–2)
LYMPHOCYTES # BLD AUTO: 1.25 THOUSANDS/ÂΜL (ref 0.6–4.47)
LYMPHOCYTES NFR BLD AUTO: 11 % (ref 14–44)
MCH RBC QN AUTO: 27.8 PG (ref 26.8–34.3)
MCHC RBC AUTO-ENTMCNC: 29.4 G/DL (ref 31.4–37.4)
MCV RBC AUTO: 94 FL (ref 82–98)
MONOCYTES # BLD AUTO: 0.92 THOUSAND/ÂΜL (ref 0.17–1.22)
MONOCYTES NFR BLD AUTO: 8 % (ref 4–12)
NEUTROPHILS # BLD AUTO: 8.73 THOUSANDS/ÂΜL (ref 1.85–7.62)
NEUTS SEG NFR BLD AUTO: 79 % (ref 43–75)
NRBC BLD AUTO-RTO: 0 /100 WBCS
PLATELET # BLD AUTO: 508 THOUSANDS/UL (ref 149–390)
PMV BLD AUTO: 11.1 FL (ref 8.9–12.7)
POTASSIUM SERPL-SCNC: 3.7 MMOL/L (ref 3.5–5.3)
RBC # BLD AUTO: 2.7 MILLION/UL (ref 3.88–5.62)
SODIUM SERPL-SCNC: 148 MMOL/L (ref 135–147)
WBC # BLD AUTO: 11.09 THOUSAND/UL (ref 4.31–10.16)

## 2022-11-26 RX ORDER — POTASSIUM CHLORIDE 14.9 MG/ML
20 INJECTION INTRAVENOUS ONCE
Status: COMPLETED | OUTPATIENT
Start: 2022-11-26 | End: 2022-11-26

## 2022-11-26 RX ADMIN — DULOXETINE HYDROCHLORIDE 60 MG: 60 CAPSULE, DELAYED RELEASE ORAL at 08:59

## 2022-11-26 RX ADMIN — THIAMINE HYDROCHLORIDE 100 MG: 100 INJECTION, SOLUTION INTRAMUSCULAR; INTRAVENOUS at 03:37

## 2022-11-26 RX ADMIN — SODIUM CHLORIDE 10 UNITS/HR: 9 INJECTION, SOLUTION INTRAVENOUS at 12:57

## 2022-11-26 RX ADMIN — DEXTROSE 50 ML/HR: 5 SOLUTION INTRAVENOUS at 03:36

## 2022-11-26 RX ADMIN — QUETIAPINE FUMARATE 25 MG: 25 TABLET ORAL at 21:39

## 2022-11-26 RX ADMIN — CYANOCOBALAMIN TAB 500 MCG 500 MCG: 500 TAB at 08:59

## 2022-11-26 RX ADMIN — HYDROMORPHONE HYDROCHLORIDE 0.3 MG: 1 INJECTION, SOLUTION INTRAMUSCULAR; INTRAVENOUS; SUBCUTANEOUS at 19:31

## 2022-11-26 RX ADMIN — HEPARIN SODIUM 5000 UNITS: 5000 INJECTION INTRAVENOUS; SUBCUTANEOUS at 14:55

## 2022-11-26 RX ADMIN — OXYCODONE HYDROCHLORIDE 5 MG: 5 SOLUTION ORAL at 05:11

## 2022-11-26 RX ADMIN — OXYCODONE HYDROCHLORIDE 5 MG: 5 SOLUTION ORAL at 12:58

## 2022-11-26 RX ADMIN — AMLODIPINE BESYLATE 5 MG: 5 TABLET ORAL at 09:00

## 2022-11-26 RX ADMIN — GABAPENTIN 100 MG: 100 CAPSULE ORAL at 21:42

## 2022-11-26 RX ADMIN — POTASSIUM CHLORIDE 20 MEQ: 14.9 INJECTION, SOLUTION INTRAVENOUS at 12:57

## 2022-11-26 RX ADMIN — GABAPENTIN 100 MG: 100 CAPSULE ORAL at 08:59

## 2022-11-26 RX ADMIN — THIAMINE HYDROCHLORIDE 100 MG: 100 INJECTION, SOLUTION INTRAMUSCULAR; INTRAVENOUS at 20:08

## 2022-11-26 RX ADMIN — INSULIN LISPRO 4 UNITS: 100 INJECTION, SOLUTION INTRAVENOUS; SUBCUTANEOUS at 08:59

## 2022-11-26 RX ADMIN — AMLODIPINE BESYLATE 5 MG: 5 TABLET ORAL at 21:43

## 2022-11-26 RX ADMIN — PANTOPRAZOLE SODIUM 40 MG: 40 INJECTION, POWDER, FOR SOLUTION INTRAVENOUS at 21:38

## 2022-11-26 RX ADMIN — ACETAMINOPHEN 650 MG: 325 TABLET ORAL at 05:12

## 2022-11-26 RX ADMIN — INSULIN LISPRO 5 UNITS: 100 INJECTION, SOLUTION INTRAVENOUS; SUBCUTANEOUS at 08:59

## 2022-11-26 RX ADMIN — ACETAMINOPHEN 650 MG: 325 TABLET ORAL at 18:17

## 2022-11-26 RX ADMIN — MELATONIN 6 MG: at 21:39

## 2022-11-26 RX ADMIN — FOLIC ACID 1 MG: 1 TABLET ORAL at 09:00

## 2022-11-26 RX ADMIN — THIAMINE HYDROCHLORIDE 100 MG: 100 INJECTION, SOLUTION INTRAMUSCULAR; INTRAVENOUS at 12:57

## 2022-11-26 RX ADMIN — ACETAMINOPHEN 650 MG: 325 TABLET ORAL at 12:58

## 2022-11-26 RX ADMIN — PANTOPRAZOLE SODIUM 40 MG: 40 INJECTION, POWDER, FOR SOLUTION INTRAVENOUS at 08:59

## 2022-11-26 RX ADMIN — ATORVASTATIN CALCIUM 40 MG: 40 TABLET, FILM COATED ORAL at 18:17

## 2022-11-26 RX ADMIN — HEPARIN SODIUM 5000 UNITS: 5000 INJECTION INTRAVENOUS; SUBCUTANEOUS at 21:38

## 2022-11-26 RX ADMIN — HEPARIN SODIUM 5000 UNITS: 5000 INJECTION INTRAVENOUS; SUBCUTANEOUS at 05:12

## 2022-11-26 RX ADMIN — CHLORHEXIDINE GLUCONATE 15 ML: 1.2 SOLUTION ORAL at 21:39

## 2022-11-26 RX ADMIN — CHLORHEXIDINE GLUCONATE 15 ML: 1.2 SOLUTION ORAL at 09:00

## 2022-11-26 NOTE — PROGRESS NOTES
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 who presented to the hospital for ex lap, transverse colon resection, segment 3 liver resection, ablation, reversal of colostomy on 11/07, hospital course complicated with left upper quadrant hematoma and ESBL bacteremia   Endocrinology consulted for the management of type 2 diabetes mellitus in the setting of liver metastases  Currently pt off of tube feeds     Objective:     Vitals: Blood pressure 137/74, pulse 82, temperature 98 4 °F (36 9 °C), temperature source Oral, resp  rate 18, height 5' 8" (1 727 m), weight 114 kg (251 lb 8 7 oz), SpO2 91 %  ,Body mass index is 38 25 kg/m²        Intake/Output Summary (Last 24 hours) at 11/26/2022 0908  Last data filed at 11/26/2022 0522  Gross per 24 hour   Intake 1436 67 ml   Output 1790 ml   Net -353 33 ml       Physical Exam:  General Appearance: awake, appears stated age and cooperative  Head: Normocephalic, without obvious abnormality, atraumatic  Extremities: moves all extremities  Skin: Skin color and temperature normal    Pulm: no labored breathing        POC Glucose (mg/dl)   Date Value   11/26/2022 283 (H)   11/25/2022 266 (H)   11/25/2022 235 (H)   11/25/2022 218 (H)   11/25/2022 229 (H)   11/25/2022 225 (H)   11/24/2022 235 (H)   11/24/2022 238 (H)   11/24/2022 296 (H)   11/23/2022 272 (H)         Assessment and plan:     Type 2 diabetes mellitus with hyperglycemia  HbA1c 8 0 September 2022  Home regimen:  Januvia 100 mg daily, metformin glyburide 5-500 mg QD, CGM Parrish  Inpatient regimen:   Lantus 10 units at bedtime, correctional scale algo with meals and at bedtime     Recommendations:  Currently pt is off of tube feeds  Pt reports good appetite, tolerating PO  Currently on Lantus 22 units bedtime and lispro 5 units t i d  With meals  Patient has volume depletion/ edema currently on IV fluids  Per orally disease why subcutaneous insulin is not working properly  Recommend to treat the  Volume depletion and continue with subcutaneous insulin if concern for hyperglycemia will recommend to start insulin drip      Portions of the record may have been created with voice recognition software

## 2022-11-26 NOTE — QUICK NOTE
11/26/2022 10:24 AM -  Patricia Escalantemarco's chart and case were reviewed by Leonel Guerrero DO  Mode of review included electronic chart check    Per review, symptoms remain controlled on current regimen and no changes are made at this time  Total OME use 11/25/22 = 22 5  Please continue the regimen in place, and review our last note for details  For dispo plan, please review Case Management notes  Palliative care will return on 11/28/22  Patient is appropriate for outpatient follow-up  We will make arrangements through our office  For urgent issues or any questions/concerns, please notify on-call provider via [facility]  You may also call our answering service 24/7 at   Leonel Guerrero DO  Palliative and Supportive Care  Clinic/Answering Service: [phone]  You can find me on [employer]!

## 2022-11-26 NOTE — PROGRESS NOTES
RN asked patient multiple times if he would like to go back into bed, patient declined, stated would like to remain in recliner  RN educated pt on why staying in the recliner for multiple hours and due to body habitus and pts decrease strength would lead to increase risk of skin breakdown d/t inability to turn and reposition properly in chair  Pt verbalized understanding

## 2022-11-26 NOTE — PROGRESS NOTES
Progress Note - Infectious Disease   Celena Herrera 61 y o  male MRN: [id number]  Unit/Bed#: [facility] 205-01 Encounter: [id number]      Impression/Recommendations:  1  Sepsis, secondary to intra-abdominal infection and bacteremia   Patient is clinically much improved on antibiotic and status post I&D/bowel resection   WBC is much decreased, near normal   Temperature intermittently low-grade, but otherwise has been down  Patient completed antibiotic course  Observe off antibiotic  Monitor temperature/WBC  Monitor hemodynamics      2  ESBL producing E coli bacteremia, likely secondary to gut translocation during recent colon surgery   Patient is clinically improved   Bacteremia cleared   Patient completed antibiotic course  No further antibiotic needed for this      3  Anastomotic leak up with intra-abdominal abscess/infected hematoma   Patient is status post exploratory laparotomy, with drainage of abscess and cecum resection   Operative culture growing ESBL producing E coli again, with lactobacillus   Patient had repeat ex lap, with healthy-appearing bowel   Repeat CT with resolved intra-abdominal collections  Patient completed antibiotic course  Observe off for antibiotic  Wound care per surgery      4  Metastatic colon cancer, with liver metastases   Patient is status post transverse colon resection  Surgical oncology follow-up      5  ALEXY, superimposed on CKD   Creatinine worsen postop but improving again  Monitor creatinine       6  Acute hypoxic respiratory failure, previously on ventilator   Patient was extubated but now intubated again postop   No clinical pneumonia  Monitor respiratory status      7  Encephalopathy, multifactorial   Mental status has improved, although patient is still mildly confused  Monitor mental status      8  Type 2 DM      Discussed with patient in detail regarding the above plan      Antibiotics:  Off antibiotic     Subjective:  Patient was seen earlier today    He is comfortable, without specific complaints  Minimal abdominal pain  He is mildly confused  Temperature stays down   No chills  He is tolerating antibiotic well   No nausea, vomiting or diarrhea      Objective:  Vitals:  Temp:  [97 4 °F (36 3 °C)-100 1 °F (37 8 °C)] 97 4 °F (36 3 °C)  HR:  [82-94] 92  Resp:  [18-20] 18  BP: (133-159)/(69-76) 150/76  SpO2:  [91 %-96 %] 96 %  Temp (24hrs), Av 4 °F (36 9 °C), Min:97 4 °F (36 3 °C), Max:100 1 °F (37 8 °C)  Current: Temperature: (!) 97 4 °F (36 3 °C)    Physical Exam:     General: Awake, alert, cooperative, no distress  Mild confusion   Neck:  Supple  No mass  No lymphadenopathy  Lungs: Expansion symmetric, no rales, no wheezing, respirations unlabored  Heart:  Regular rate and rhythm, S1 and S2 normal, no murmur  Abdomen: Soft, mild distention  Wound with VAC, without purulence  Mild tenderness  Normal bowel sounds  Extremities: Improving leg edema  No erythema/warmth  No ulcer  Nontender to palpation  Skin:  No rash  Neuro: Moves all extremities  Invasive Devices     Central Venous Catheter Line  Duration           Port A Cath 03/10/22 Right Chest 261 days          Peripheral Intravenous Line  Duration           Peripheral IV 22 Right Antecubital 3 days    Peripheral IV 22 Right;Upper;Ventral (anterior) Arm 1 day          Drain  Duration           Closed/Suction Drain Left Abdomen Bulb 19 Fr  9 days    Ileostomy LUQ 8 days                Labs studies:   I have personally reviewed pertinent labs    Results from last 7 days   Lab Units 22  0512 22  0459 22  1436 22  0517   POTASSIUM mmol/L 3 7 4 0 4 0 4 0   CHLORIDE mmol/L 121* 127* 125* 124*   CO2 mmol/L 23 22 25 25   BUN mg/dL 27* 30* 33* 36*   CREATININE mg/dL 1 39* 1 37* 1 39* 1 42*   EGFR ml/min/1 73sq m 55 56 55 54   CALCIUM mg/dL 8 2* 7 9* 8 0* 7 8*   AST U/L  --  67*  --  68*   ALT U/L  --  19  --  19   ALK PHOS U/L  --  436*  --  467*     Results from last 7 days   Lab Units 11/26/22  0512 11/25/22  0459 11/24/22  0517   WBC Thousand/uL 11 09* 11 82* 12 56*   HEMOGLOBIN g/dL 7 5* 7 1* 7 2*   PLATELETS Thousands/uL 508* 527* 496*           Imaging Studies:   I have personally reviewed pertinent imaging study reports and images in PACS  EKG, Pathology, and Other Studies:   I have personally reviewed pertinent reports

## 2022-11-26 NOTE — PLAN OF CARE
Problem: MOBILITY - ADULT  Goal: Maintain or return to baseline ADL function  Description: INTERVENTIONS:  -  Assess patient's ability to carry out ADLs; assess patient's baseline for ADL function and identify physical deficits which impact ability to perform ADLs (bathing, care of mouth/teeth, toileting, grooming, dressing, etc )  - Assess/evaluate cause of self-care deficits   - Assess range of motion  - Assess patient's mobility; develop plan if impaired  - Assess patient's need for assistive devices and provide as appropriate  - Encourage maximum independence but intervene and supervise when necessary  - Involve family in performance of ADLs  - Assess for home care needs following discharge   - Consider OT consult to assist with ADL evaluation and planning for discharge  - Provide patient education as appropriate  Outcome: Progressing  Goal: Maintains/Returns to pre admission functional level  Description: INTERVENTIONS:  - Perform BMAT or MOVE assessment daily    - Set and communicate daily mobility goal to care team and patient/family/caregiver  - Collaborate with rehabilitation services on mobility goals if consulted  - Perform Range of Motion 3 times a day  - Reposition patient every 2 hours    - Dangle patient 3 times a day  - Stand patient 2 times a day  - Ambulate patient 2 times a day  - Out of bed to chair 3 times a day   - Out of bed for meals 3 times a day  - Out of bed for toileting  - Record patient progress and toleration of activity level   Outcome: Progressing     Problem: Prexisting or High Potential for Compromised Skin Integrity  Goal: Skin integrity is maintained or improved  Description: INTERVENTIONS:  - Identify patients at risk for skin breakdown  - Assess and monitor skin integrity  - Assess and monitor nutrition and hydration status  - Monitor labs   - Assess for incontinence   - Turn and reposition patient  - Assist with mobility/ambulation  - Relieve pressure over bony prominences  - Avoid friction and shearing  - Provide appropriate hygiene as needed including keeping skin clean and dry  - Evaluate need for skin moisturizer/barrier cream  - Collaborate with interdisciplinary team   - Patient/family teaching  - Consider wound care consult   Outcome: Progressing     Problem: Potential for Falls  Goal: Patient will remain free of falls  Description: INTERVENTIONS:  - Educate patient/family on patient safety including physical limitations  - Instruct patient to call for assistance with activity   - Consult OT/PT to assist with strengthening/mobility   - Keep Call bell within reach  - Keep bed low and locked with side rails adjusted as appropriate  - Keep care items and personal belongings within reach  - Initiate and maintain comfort rounds  - Make Fall Risk Sign visible to staff  - Offer Toileting every 2 Hours, in advance of need  - Initiate/Maintain bed alarm  - Obtain necessary fall risk management equipment: low bed, bed alarm, fall mats  - Apply yellow socks and bracelet for high fall risk patients  - Consider moving patient to room near nurses station  Outcome: Progressing     Problem: Nutrition/Hydration-ADULT  Goal: Nutrient/Hydration intake appropriate for improving, restoring or maintaining nutritional needs  Description: Monitor and assess patient's nutrition/hydration status for malnutrition  Collaborate with interdisciplinary team and initiate plan and interventions as ordered  Monitor patient's weight and dietary intake as ordered or per policy  Utilize nutrition screening tool and intervene as necessary  Determine patient's food preferences and provide high-protein, high-caloric foods as appropriate       INTERVENTIONS:  - Monitor oral intake, urinary output, labs, and treatment plans  - Assess nutrition and hydration status and recommend course of action  - Evaluate amount of meals eaten  - Assist patient with eating if necessary   - Allow adequate time for meals  - Recommend/ encourage appropriate diets, oral nutritional supplements, and vitamin/mineral supplements  - Order, calculate, and assess calorie counts as needed  - Recommend, monitor, and adjust tube feedings and TPN/PPN based on assessed needs  - Assess need for intravenous fluids  - Provide specific nutrition/hydration education as appropriate  - Include patient/family/caregiver in decisions related to nutrition  Outcome: Progressing     Problem: PAIN - ADULT  Goal: Verbalizes/displays adequate comfort level or baseline comfort level  Description: Interventions:  - Encourage patient to monitor pain and request assistance  - Assess pain using appropriate pain scale  - Administer analgesics based on type and severity of pain and evaluate response  - Implement non-pharmacological measures as appropriate and evaluate response  - Consider cultural and social influences on pain and pain management  - Notify physician/advanced practitioner if interventions unsuccessful or patient reports new pain  Outcome: Progressing     Problem: INFECTION - ADULT  Goal: Absence or prevention of progression during hospitalization  Description: INTERVENTIONS:  - Assess and monitor for signs and symptoms of infection  - Monitor lab/diagnostic results  - Monitor all insertion sites, i e  indwelling lines, tubes, and drains  - Monitor endotracheal if appropriate and nasal secretions for changes in amount and color  - Orosi appropriate cooling/warming therapies per order  - Administer medications as ordered  - Instruct and encourage patient and family to use good hand hygiene technique  - Identify and instruct in appropriate isolation precautions for identified infection/condition  Outcome: Progressing  Goal: Absence of fever/infection during neutropenic period  Description: INTERVENTIONS:  - Monitor WBC    Outcome: Progressing     Problem: SAFETY ADULT  Goal: Maintain or return to baseline ADL function  Description: INTERVENTIONS:  - Assess patient's ability to carry out ADLs; assess patient's baseline for ADL function and identify physical deficits which impact ability to perform ADLs (bathing, care of mouth/teeth, toileting, grooming, dressing, etc )  - Assess/evaluate cause of self-care deficits   - Assess range of motion  - Assess patient's mobility; develop plan if impaired  - Assess patient's need for assistive devices and provide as appropriate  - Encourage maximum independence but intervene and supervise when necessary  - Involve family in performance of ADLs  - Assess for home care needs following discharge   - Consider OT consult to assist with ADL evaluation and planning for discharge  - Provide patient education as appropriate  Outcome: Progressing  Goal: Maintains/Returns to pre admission functional level  Description: INTERVENTIONS:  - Perform BMAT or MOVE assessment daily    - Set and communicate daily mobility goal to care team and patient/family/caregiver  - Collaborate with rehabilitation services on mobility goals if consulted  - Perform Range of Motion 3 times a day  - Reposition patient every 2 hours    - Dangle patient 2 times a day  - Stand patient 2 times a day  - Ambulate patient 2 times a day  - Out of bed to chair 3 times a day   - Out of bed for meals 3 times a day  - Out of bed for toileting  - Record patient progress and toleration of activity level   Outcome: Progressing  Goal: Patient will remain free of falls  Description: INTERVENTIONS:  - Educate patient/family on patient safety including physical limitations  - Instruct patient to call for assistance with activity   - Consult OT/PT to assist with strengthening/mobility   - Keep Call bell within reach  - Keep bed low and locked with side rails adjusted as appropriate  - Keep care items and personal belongings within reach  - Initiate and maintain comfort rounds  - Make Fall Risk Sign visible to staff  - Offer Toileting every 2 Hours, in advance of need  - Initiate/Maintain bed alarm  - Obtain necessary fall risk management equipment: low bed, bed alarm  - Apply yellow socks and bracelet for high fall risk patients  - Consider moving patient to room near nurses station  Outcome: Progressing     Problem: DISCHARGE PLANNING  Goal: Discharge to home or other facility with appropriate resources  Description: INTERVENTIONS:  - Identify barriers to discharge w/patient and caregiver  - Arrange for needed discharge resources and transportation as appropriate  - Identify discharge learning needs (meds, wound care, etc )  - Arrange for interpretive services to assist at discharge as needed  - Refer to Case Management Department for coordinating discharge planning if the patient needs post-hospital services based on physician/advanced practitioner order or complex needs related to functional status, cognitive ability, or social support system  Outcome: Progressing     Problem: Knowledge Deficit  Goal: Patient/family/caregiver demonstrates understanding of disease process, treatment plan, medications, and discharge instructions  Description: Complete learning assessment and assess knowledge base    Interventions:  - Provide teaching at level of understanding  - Provide teaching via preferred learning methods  Outcome: Progressing     Problem: RESPIRATORY - ADULT  Goal: Achieves optimal ventilation and oxygenation  Description: INTERVENTIONS:  - Assess for changes in respiratory status  - Assess for changes in mentation and behavior  - Position to facilitate oxygenation and minimize respiratory effort  - Oxygen administered by appropriate delivery if ordered  - Initiate smoking cessation education as indicated  - Encourage broncho-pulmonary hygiene including cough, deep breathe, Incentive Spirometry  - Assess the need for suctioning and aspirate as needed  - Assess and instruct to report SOB or any respiratory difficulty  - Respiratory Therapy support as indicated  Outcome: Progressing     Problem: GASTROINTESTINAL - ADULT  Goal: Minimal or absence of nausea and/or vomiting  Description: INTERVENTIONS:  - Administer IV fluids if ordered to ensure adequate hydration  - Maintain NPO status until nausea and vomiting are resolved  - Nasogastric tube if ordered  - Administer ordered antiemetic medications as needed  - Provide nonpharmacologic comfort measures as appropriate  - Advance diet as tolerated, if ordered  - Consider nutrition services referral to assist patient with adequate nutrition and appropriate food choices  Outcome: Progressing  Goal: Maintains or returns to baseline bowel function  Description: INTERVENTIONS:  - Assess bowel function  - Encourage oral fluids to ensure adequate hydration  - Administer IV fluids if ordered to ensure adequate hydration  - Administer ordered medications as needed  - Encourage mobilization and activity  - Consider nutritional services referral to assist patient with adequate nutrition and appropriate food choices  Outcome: Progressing  Goal: Maintains adequate nutritional intake  Description: INTERVENTIONS:  - Monitor percentage of each meal consumed  - Identify factors contributing to decreased intake, treat as appropriate  - Assist with meals as needed  - Monitor I&O, weight, and lab values if indicated  - Obtain nutrition services referral as needed  Outcome: Progressing  Goal: Establish and maintain optimal ostomy function  Description: INTERVENTIONS:  - Assess bowel function  - Encourage oral fluids to ensure adequate hydration  - Administer IV fluids if ordered to ensure adequate hydration   - Administer ordered medications as needed  - Encourage mobilization and activity  - Nutrition services referral to assist patient with appropriate food choices  - Assess stoma site  - Consider wound care consult   Outcome: Progressing  Goal: Oral mucous membranes remain intact  Description: INTERVENTIONS  - Assess oral mucosa and hygiene practices  - Implement preventative oral hygiene regimen  - Implement oral medicated treatments as ordered  - Initiate Nutrition services referral as needed  Outcome: Progressing     Problem: GENITOURINARY - ADULT  Goal: Maintains or returns to baseline urinary function  Description: INTERVENTIONS:  - Assess urinary function  - Encourage oral fluids to ensure adequate hydration if ordered  - Administer IV fluids as ordered to ensure adequate hydration  - Administer ordered medications as needed  - Offer frequent toileting  - Follow urinary retention protocol if ordered  Outcome: Progressing  Goal: Absence of urinary retention  Description: INTERVENTIONS:  - Assess patient’s ability to void and empty bladder  - Monitor I/O  - Bladder scan as needed  - Discuss with physician/AP medications to alleviate retention as needed  - Discuss catheterization for long term situations as appropriate  Outcome: Progressing  Goal: Urinary catheter remains patent  Description: INTERVENTIONS:  - Assess patency of urinary catheter  - If patient has a chronic rivero, consider changing catheter if non-functioning  - Follow guidelines for intermittent irrigation of non-functioning urinary catheter  Outcome: Progressing     Problem: METABOLIC, FLUID AND ELECTROLYTES - ADULT  Goal: Electrolytes maintained within normal limits  Description: INTERVENTIONS:  - Monitor labs and assess patient for signs and symptoms of electrolyte imbalances  - Administer electrolyte replacement as ordered  - Monitor response to electrolyte replacements, including repeat lab results as appropriate  - Instruct patient on fluid and nutrition as appropriate  Outcome: Progressing  Goal: Fluid balance maintained  Description: INTERVENTIONS:  - Monitor labs   - Monitor I/O and WT  - Instruct patient on fluid and nutrition as appropriate  - Assess for signs & symptoms of volume excess or deficit  Outcome: Progressing  Goal: Glucose maintained within target range  Description: INTERVENTIONS:  - Monitor Blood Glucose as ordered  - Assess for signs and symptoms of hyperglycemia and hypoglycemia  - Administer ordered medications to maintain glucose within target range  - Assess nutritional intake and initiate nutrition service referral as needed  Outcome: Progressing

## 2022-11-27 LAB
ABO GROUP BLD: NORMAL
ANION GAP SERPL CALCULATED.3IONS-SCNC: 5 MMOL/L (ref 4–13)
BASOPHILS # BLD AUTO: 0.03 THOUSANDS/ÂΜL (ref 0–0.1)
BASOPHILS NFR BLD AUTO: 0 % (ref 0–1)
BLD GP AB SCN SERPL QL: NEGATIVE
BUN SERPL-MCNC: 24 MG/DL (ref 5–25)
CALCIUM SERPL-MCNC: 8.1 MG/DL (ref 8.3–10.1)
CHLORIDE SERPL-SCNC: 123 MMOL/L (ref 96–108)
CO2 SERPL-SCNC: 22 MMOL/L (ref 21–32)
CREAT SERPL-MCNC: 1.34 MG/DL (ref 0.6–1.3)
EOSINOPHIL # BLD AUTO: 0 THOUSAND/ÂΜL (ref 0–0.61)
EOSINOPHIL NFR BLD AUTO: 0 % (ref 0–6)
ERYTHROCYTE [DISTWIDTH] IN BLOOD BY AUTOMATED COUNT: 16.4 % (ref 11.6–15.1)
GFR SERPL CREATININE-BSD FRML MDRD: 57 ML/MIN/1.73SQ M
GLUCOSE SERPL-MCNC: 106 MG/DL (ref 65–140)
GLUCOSE SERPL-MCNC: 111 MG/DL (ref 65–140)
GLUCOSE SERPL-MCNC: 114 MG/DL (ref 65–140)
GLUCOSE SERPL-MCNC: 125 MG/DL (ref 65–140)
GLUCOSE SERPL-MCNC: 136 MG/DL (ref 65–140)
GLUCOSE SERPL-MCNC: 147 MG/DL (ref 65–140)
GLUCOSE SERPL-MCNC: 171 MG/DL (ref 65–140)
GLUCOSE SERPL-MCNC: 174 MG/DL (ref 65–140)
GLUCOSE SERPL-MCNC: 174 MG/DL (ref 65–140)
GLUCOSE SERPL-MCNC: 176 MG/DL (ref 65–140)
GLUCOSE SERPL-MCNC: 180 MG/DL (ref 65–140)
GLUCOSE SERPL-MCNC: 193 MG/DL (ref 65–140)
GLUCOSE SERPL-MCNC: 207 MG/DL (ref 65–140)
HCT VFR BLD AUTO: 21.9 % (ref 36.5–49.3)
HCT VFR BLD AUTO: 22.2 % (ref 36.5–49.3)
HGB BLD-MCNC: 6.8 G/DL (ref 12–17)
HGB BLD-MCNC: 6.9 G/DL (ref 12–17)
IMM GRANULOCYTES # BLD AUTO: 0.14 THOUSAND/UL (ref 0–0.2)
IMM GRANULOCYTES NFR BLD AUTO: 1 % (ref 0–2)
LYMPHOCYTES # BLD AUTO: 1.31 THOUSANDS/ÂΜL (ref 0.6–4.47)
LYMPHOCYTES NFR BLD AUTO: 10 % (ref 14–44)
MCH RBC QN AUTO: 28.6 PG (ref 26.8–34.3)
MCHC RBC AUTO-ENTMCNC: 31.1 G/DL (ref 31.4–37.4)
MCV RBC AUTO: 92 FL (ref 82–98)
MONOCYTES # BLD AUTO: 1.16 THOUSAND/ÂΜL (ref 0.17–1.22)
MONOCYTES NFR BLD AUTO: 9 % (ref 4–12)
NEUTROPHILS # BLD AUTO: 10.2 THOUSANDS/ÂΜL (ref 1.85–7.62)
NEUTS SEG NFR BLD AUTO: 80 % (ref 43–75)
NRBC BLD AUTO-RTO: 0 /100 WBCS
PLATELET # BLD AUTO: 448 THOUSANDS/UL (ref 149–390)
PMV BLD AUTO: 10.8 FL (ref 8.9–12.7)
POTASSIUM SERPL-SCNC: 3.6 MMOL/L (ref 3.5–5.3)
RBC # BLD AUTO: 2.38 MILLION/UL (ref 3.88–5.62)
RH BLD: POSITIVE
SODIUM SERPL-SCNC: 150 MMOL/L (ref 135–147)
SPECIMEN EXPIRATION DATE: NORMAL
WBC # BLD AUTO: 12.84 THOUSAND/UL (ref 4.31–10.16)

## 2022-11-27 RX ORDER — POTASSIUM CHLORIDE 29.8 MG/ML
40 INJECTION INTRAVENOUS ONCE
Status: DISCONTINUED | OUTPATIENT
Start: 2022-11-27 | End: 2022-11-27 | Stop reason: DRUGHIGH

## 2022-11-27 RX ORDER — POTASSIUM CHLORIDE 14.9 MG/ML
20 INJECTION INTRAVENOUS
Status: COMPLETED | OUTPATIENT
Start: 2022-11-27 | End: 2022-11-27

## 2022-11-27 RX ADMIN — MELATONIN 6 MG: at 22:55

## 2022-11-27 RX ADMIN — OXYCODONE HYDROCHLORIDE 5 MG: 5 SOLUTION ORAL at 11:01

## 2022-11-27 RX ADMIN — OXYCODONE HYDROCHLORIDE 5 MG: 5 SOLUTION ORAL at 05:15

## 2022-11-27 RX ADMIN — ACETAMINOPHEN 650 MG: 325 TABLET ORAL at 05:16

## 2022-11-27 RX ADMIN — AMLODIPINE BESYLATE 5 MG: 5 TABLET ORAL at 08:46

## 2022-11-27 RX ADMIN — POTASSIUM CHLORIDE 20 MEQ: 14.9 INJECTION, SOLUTION INTRAVENOUS at 13:20

## 2022-11-27 RX ADMIN — THIAMINE HYDROCHLORIDE 100 MG: 100 INJECTION, SOLUTION INTRAMUSCULAR; INTRAVENOUS at 10:59

## 2022-11-27 RX ADMIN — HEPARIN SODIUM 5000 UNITS: 5000 INJECTION INTRAVENOUS; SUBCUTANEOUS at 05:16

## 2022-11-27 RX ADMIN — CHLORHEXIDINE GLUCONATE 15 ML: 1.2 SOLUTION ORAL at 22:00

## 2022-11-27 RX ADMIN — AMLODIPINE BESYLATE 5 MG: 5 TABLET ORAL at 19:16

## 2022-11-27 RX ADMIN — CHLORHEXIDINE GLUCONATE 15 ML: 1.2 SOLUTION ORAL at 08:46

## 2022-11-27 RX ADMIN — ATORVASTATIN CALCIUM 40 MG: 40 TABLET, FILM COATED ORAL at 19:16

## 2022-11-27 RX ADMIN — PANTOPRAZOLE SODIUM 40 MG: 40 INJECTION, POWDER, FOR SOLUTION INTRAVENOUS at 22:00

## 2022-11-27 RX ADMIN — THIAMINE HYDROCHLORIDE 100 MG: 100 INJECTION, SOLUTION INTRAMUSCULAR; INTRAVENOUS at 03:51

## 2022-11-27 RX ADMIN — OXYCODONE HYDROCHLORIDE 5 MG: 5 SOLUTION ORAL at 19:17

## 2022-11-27 RX ADMIN — FOLIC ACID 1 MG: 1 TABLET ORAL at 08:46

## 2022-11-27 RX ADMIN — HYDROMORPHONE HYDROCHLORIDE 0.3 MG: 1 INJECTION, SOLUTION INTRAMUSCULAR; INTRAVENOUS; SUBCUTANEOUS at 01:43

## 2022-11-27 RX ADMIN — ACETAMINOPHEN 650 MG: 325 TABLET ORAL at 19:15

## 2022-11-27 RX ADMIN — ACETAMINOPHEN 650 MG: 325 TABLET ORAL at 11:01

## 2022-11-27 RX ADMIN — GABAPENTIN 100 MG: 100 CAPSULE ORAL at 08:46

## 2022-11-27 RX ADMIN — CYANOCOBALAMIN TAB 500 MCG 500 MCG: 500 TAB at 08:46

## 2022-11-27 RX ADMIN — HEPARIN SODIUM 5000 UNITS: 5000 INJECTION INTRAVENOUS; SUBCUTANEOUS at 22:55

## 2022-11-27 RX ADMIN — GABAPENTIN 100 MG: 100 CAPSULE ORAL at 19:16

## 2022-11-27 RX ADMIN — DULOXETINE HYDROCHLORIDE 60 MG: 60 CAPSULE, DELAYED RELEASE ORAL at 08:46

## 2022-11-27 RX ADMIN — POTASSIUM CHLORIDE 20 MEQ: 14.9 INJECTION, SOLUTION INTRAVENOUS at 11:00

## 2022-11-27 RX ADMIN — PANTOPRAZOLE SODIUM 40 MG: 40 INJECTION, POWDER, FOR SOLUTION INTRAVENOUS at 08:46

## 2022-11-27 RX ADMIN — QUETIAPINE FUMARATE 25 MG: 25 TABLET ORAL at 22:55

## 2022-11-27 RX ADMIN — HEPARIN SODIUM 5000 UNITS: 5000 INJECTION INTRAVENOUS; SUBCUTANEOUS at 13:21

## 2022-11-27 NOTE — PLAN OF CARE
Problem: MOBILITY - ADULT  Goal: Maintain or return to baseline ADL function  Description: INTERVENTIONS:  -  Assess patient's ability to carry out ADLs; assess patient's baseline for ADL function and identify physical deficits which impact ability to perform ADLs (bathing, care of mouth/teeth, toileting, grooming, dressing, etc )  - Assess/evaluate cause of self-care deficits   - Assess range of motion  - Assess patient's mobility; develop plan if impaired  - Assess patient's need for assistive devices and provide as appropriate  - Encourage maximum independence but intervene and supervise when necessary  - Involve family in performance of ADLs  - Assess for home care needs following discharge   - Consider OT consult to assist with ADL evaluation and planning for discharge  - Provide patient education as appropriate  Outcome: Progressing  Goal: Maintains/Returns to pre admission functional level  Description: INTERVENTIONS:  - Perform BMAT or MOVE assessment daily    - Set and communicate daily mobility goal to care team and patient/family/caregiver  - Collaborate with rehabilitation services on mobility goals if consulted  - Perform Range of Motion 3 times a day  - Reposition patient every 2 hours    - Dangle patient 3 times a day  - Stand patient 3 times a day  - Ambulate patient 3 times a day  - Out of bed to chair 3 times a day   - Out of bed for meals 3 times a day  - Out of bed for toileting  - Record patient progress and toleration of activity level   Outcome: Progressing     Problem: Prexisting or High Potential for Compromised Skin Integrity  Goal: Skin integrity is maintained or improved  Description: INTERVENTIONS:  - Identify patients at risk for skin breakdown  - Assess and monitor skin integrity  - Assess and monitor nutrition and hydration status  - Monitor labs   - Assess for incontinence   - Turn and reposition patient  - Assist with mobility/ambulation  - Relieve pressure over bony prominences  - Avoid friction and shearing  - Provide appropriate hygiene as needed including keeping skin clean and dry  - Evaluate need for skin moisturizer/barrier cream  - Collaborate with interdisciplinary team   - Patient/family teaching  - Consider wound care consult   Outcome: Progressing     Problem: Potential for Falls  Goal: Patient will remain free of falls  Description: INTERVENTIONS:  - Educate patient/family on patient safety including physical limitations  - Instruct patient to call for assistance with activity   - Consult OT/PT to assist with strengthening/mobility   - Keep Call bell within reach  - Keep bed low and locked with side rails adjusted as appropriate  - Keep care items and personal belongings within reach  - Initiate and maintain comfort rounds  - Make Fall Risk Sign visible to staff  - Offer Toileting every  Hours, in advance of need  - Initiate/Maintain alarm  - Obtain necessary fall risk management equipment:   - Apply yellow socks and bracelet for high fall risk patients  - Consider moving patient to room near nurses station  Outcome: Progressing     Problem: Nutrition/Hydration-ADULT  Goal: Nutrient/Hydration intake appropriate for improving, restoring or maintaining nutritional needs  Description: Monitor and assess patient's nutrition/hydration status for malnutrition  Collaborate with interdisciplinary team and initiate plan and interventions as ordered  Monitor patient's weight and dietary intake as ordered or per policy  Utilize nutrition screening tool and intervene as necessary  Determine patient's food preferences and provide high-protein, high-caloric foods as appropriate       INTERVENTIONS:  - Monitor oral intake, urinary output, labs, and treatment plans  - Assess nutrition and hydration status and recommend course of action  - Evaluate amount of meals eaten  - Assist patient with eating if necessary   - Allow adequate time for meals  - Recommend/ encourage appropriate diets, oral nutritional supplements, and vitamin/mineral supplements  - Order, calculate, and assess calorie counts as needed  - Recommend, monitor, and adjust tube feedings and TPN/PPN based on assessed needs  - Assess need for intravenous fluids  - Provide specific nutrition/hydration education as appropriate  - Include patient/family/caregiver in decisions related to nutrition  Outcome: Progressing     Problem: PAIN - ADULT  Goal: Verbalizes/displays adequate comfort level or baseline comfort level  Description: Interventions:  - Encourage patient to monitor pain and request assistance  - Assess pain using appropriate pain scale  - Administer analgesics based on type and severity of pain and evaluate response  - Implement non-pharmacological measures as appropriate and evaluate response  - Consider cultural and social influences on pain and pain management  - Notify physician/advanced practitioner if interventions unsuccessful or patient reports new pain  Outcome: Progressing     Problem: INFECTION - ADULT  Goal: Absence or prevention of progression during hospitalization  Description: INTERVENTIONS:  - Assess and monitor for signs and symptoms of infection  - Monitor lab/diagnostic results  - Monitor all insertion sites, i e  indwelling lines, tubes, and drains  - Monitor endotracheal if appropriate and nasal secretions for changes in amount and color  - [place] appropriate cooling/warming therapies per order  - Administer medications as ordered  - Instruct and encourage patient and family to use good hand hygiene technique  - Identify and instruct in appropriate isolation precautions for identified infection/condition  Outcome: Progressing  Goal: Absence of fever/infection during neutropenic period  Description: INTERVENTIONS:  - Monitor WBC    Outcome: Progressing     Problem: SAFETY ADULT  Goal: Maintain or return to baseline ADL function  Description: INTERVENTIONS:  -  Assess patient's ability to carry out ADLs; assess patient's baseline for ADL function and identify physical deficits which impact ability to perform ADLs (bathing, care of mouth/teeth, toileting, grooming, dressing, etc )  - Assess/evaluate cause of self-care deficits   - Assess range of motion  - Assess patient's mobility; develop plan if impaired  - Assess patient's need for assistive devices and provide as appropriate  - Encourage maximum independence but intervene and supervise when necessary  - Involve family in performance of ADLs  - Assess for home care needs following discharge   - Consider OT consult to assist with ADL evaluation and planning for discharge  - Provide patient education as appropriate  Outcome: Progressing  Goal: Maintains/Returns to pre admission functional level  Description: INTERVENTIONS:  - Perform BMAT or MOVE assessment daily    - Set and communicate daily mobility goal to care team and patient/family/caregiver  - Collaborate with rehabilitation services on mobility goals if consulted  - Perform Range of Motion 3 times a day  - Reposition patient every 2 hours    - Dangle patient 3 times a day  - Stand patient 3 times a day  - Ambulate patient 3 times a day  - Out of bed to chair 3 times a day   - Out of bed for meals 3 times a day  - Out of bed for toileting  - Record patient progress and toleration of activity level   Outcome: Progressing  Goal: Patient will remain free of falls  Description: INTERVENTIONS:  - Educate patient/family on patient safety including physical limitations  - Instruct patient to call for assistance with activity   - Consult OT/PT to assist with strengthening/mobility   - Keep Call bell within reach  - Keep bed low and locked with side rails adjusted as appropriate  - Keep care items and personal belongings within reach  - Initiate and maintain comfort rounds  - Make Fall Risk Sign visible to staff  - Offer Toileting every Hours, in advance of need  - Initiate/Maintain alarm  - Obtain necessary fall risk management equipment:   - Apply yellow socks and bracelet for high fall risk patients  - Consider moving patient to room near nurses station  Outcome: Progressing     Problem: DISCHARGE PLANNING  Goal: Discharge to home or other facility with appropriate resources  Description: INTERVENTIONS:  - Identify barriers to discharge w/patient and caregiver  - Arrange for needed discharge resources and transportation as appropriate  - Identify discharge learning needs (meds, wound care, etc )  - Arrange for interpretive services to assist at discharge as needed  - Refer to Case Management Department for coordinating discharge planning if the patient needs post-hospital services based on physician/advanced practitioner order or complex needs related to functional status, cognitive ability, or social support system  Outcome: Progressing     Problem: Knowledge Deficit  Goal: Patient/family/caregiver demonstrates understanding of disease process, treatment plan, medications, and discharge instructions  Description: Complete learning assessment and assess knowledge base    Interventions:  - Provide teaching at level of understanding  - Provide teaching via preferred learning methods  Outcome: Progressing     Problem: RESPIRATORY - ADULT  Goal: Achieves optimal ventilation and oxygenation  Description: INTERVENTIONS:  - Assess for changes in respiratory status  - Assess for changes in mentation and behavior  - Position to facilitate oxygenation and minimize respiratory effort  - Oxygen administered by appropriate delivery if ordered  - Initiate smoking cessation education as indicated  - Encourage broncho-pulmonary hygiene including cough, deep breathe, Incentive Spirometry  - Assess the need for suctioning and aspirate as needed  - Assess and instruct to report SOB or any respiratory difficulty  - Respiratory Therapy support as indicated  Outcome: Progressing     Problem: GASTROINTESTINAL - ADULT  Goal: Minimal or absence of nausea and/or vomiting  Description: INTERVENTIONS:  - Administer IV fluids if ordered to ensure adequate hydration  - Maintain NPO status until nausea and vomiting are resolved  - Nasogastric tube if ordered  - Administer ordered antiemetic medications as needed  - Provide nonpharmacologic comfort measures as appropriate  - Advance diet as tolerated, if ordered  - Consider nutrition services referral to assist patient with adequate nutrition and appropriate food choices  Outcome: Progressing  Goal: Maintains or returns to baseline bowel function  Description: INTERVENTIONS:  - Assess bowel function  - Encourage oral fluids to ensure adequate hydration  - Administer IV fluids if ordered to ensure adequate hydration  - Administer ordered medications as needed  - Encourage mobilization and activity  - Consider nutritional services referral to assist patient with adequate nutrition and appropriate food choices  Outcome: Progressing  Goal: Maintains adequate nutritional intake  Description: INTERVENTIONS:  - Monitor percentage of each meal consumed  - Identify factors contributing to decreased intake, treat as appropriate  - Assist with meals as needed  - Monitor I&O, weight, and lab values if indicated  - Obtain nutrition services referral as needed  Outcome: Progressing  Goal: Establish and maintain optimal ostomy function  Description: INTERVENTIONS:  - Assess bowel function  - Encourage oral fluids to ensure adequate hydration  - Administer IV fluids if ordered to ensure adequate hydration   - Administer ordered medications as needed  - Encourage mobilization and activity  - Nutrition services referral to assist patient with appropriate food choices  - Assess stoma site  - Consider wound care consult   Outcome: Progressing  Goal: Oral mucous membranes remain intact  Description: INTERVENTIONS  - Assess oral mucosa and hygiene practices  - Implement preventative oral hygiene regimen  - Implement oral medicated treatments as ordered  - Initiate Nutrition services referral as needed  Outcome: Progressing     Problem: GENITOURINARY - ADULT  Goal: Maintains or returns to baseline urinary function  Description: INTERVENTIONS:  - Assess urinary function  - Encourage oral fluids to ensure adequate hydration if ordered  - Administer IV fluids as ordered to ensure adequate hydration  - Administer ordered medications as needed  - Offer frequent toileting  - Follow urinary retention protocol if ordered  Outcome: Progressing  Goal: Absence of urinary retention  Description: INTERVENTIONS:  - Assess patient’s ability to void and empty bladder  - Monitor I/O  - Bladder scan as needed  - Discuss with physician/AP medications to alleviate retention as needed  - Discuss catheterization for long term situations as appropriate  Outcome: Progressing  Goal: Urinary catheter remains patent  Description: INTERVENTIONS:  - Assess patency of urinary catheter  - If patient has a chronic rivero, consider changing catheter if non-functioning  - Follow guidelines for intermittent irrigation of non-functioning urinary catheter  Outcome: Progressing     Problem: METABOLIC, FLUID AND ELECTROLYTES - ADULT  Goal: Electrolytes maintained within normal limits  Description: INTERVENTIONS:  - Monitor labs and assess patient for signs and symptoms of electrolyte imbalances  - Administer electrolyte replacement as ordered  - Monitor response to electrolyte replacements, including repeat lab results as appropriate  - Instruct patient on fluid and nutrition as appropriate  Outcome: Progressing  Goal: Fluid balance maintained  Description: INTERVENTIONS:  - Monitor labs   - Monitor I/O and WT  - Instruct patient on fluid and nutrition as appropriate  - Assess for signs & symptoms of volume excess or deficit  Outcome: Progressing  Goal: Glucose maintained within target range  Description: INTERVENTIONS:  - Monitor Blood Glucose as ordered  - Assess for signs and symptoms of hyperglycemia and hypoglycemia  - Administer ordered medications to maintain glucose within target range  - Assess nutritional intake and initiate nutrition service referral as needed  Outcome: Progressing

## 2022-11-27 NOTE — PROGRESS NOTES
Progress Note - Infectious Disease   Lillie Kaur 61 y o  male MRN: [id number]  Unit/Bed#: [facility] 205-01 Encounter: [id number]      Impression/Recommendations:  1  Sepsis, secondary to intra-abdominal infection and bacteremia   Patient is clinically much improved on antibiotic and status post I&D/bowel resection   WBC is much decreased, near normal   Temperature intermittently low-grade, but otherwise has been down   Patient completed antibiotic course  Observe off antibiotic  Monitor temperature/WBC  Monitor hemodynamics      2  ESBL producing E coli bacteremia, likely secondary to gut translocation during recent colon surgery   Patient is clinically improved   Bacteremia cleared   Patient completed antibiotic course  No further antibiotic needed for this      3  Anastomotic leak up with intra-abdominal abscess/infected hematoma   Patient is status post exploratory laparotomy, with drainage of abscess and cecum resection   Operative culture growing ESBL producing E coli again, with lactobacillus   Patient had repeat ex lap, with healthy-appearing bowel   Repeat CT with resolved intra-abdominal collections   Patient completed antibiotic course  Observe off for antibiotic  Wound care/VAC per surgery      4  Metastatic colon cancer, with liver metastases   Patient is status post transverse colon resection  Surgical oncology follow-up      5  ALEXY, superimposed on CKD   Creatinine worsen postop but improving again  Monitor creatinine       6  Acute hypoxic respiratory failure, previously on ventilator   Patient was extubated but now intubated again postop   No clinical pneumonia  Monitor respiratory status      7  Encephalopathy, multifactorial   Mental status slowly improving  Monitor mental status      8  Type 2 DM      Discussed with patient in detail regarding the above plan      Antibiotics:  Off antibiotic     Subjective:  Patient is comfortable, without specific complaints    Minimal abdominal pain   Mental status is good today  Temperature stays down   No chills  He is tolerating antibiotic well   No nausea, vomiting or diarrhea      Objective:  Vitals:  Temp:  [97 4 °F (36 3 °C)-99 1 °F (37 3 °C)] 98 4 °F (36 9 °C)  HR:  [] 90  Resp:  [18] 18  BP: (136-152)/(68-78) 136/68  SpO2:  [93 %-96 %] 93 %  Temp (24hrs), Av 5 °F (36 9 °C), Min:97 4 °F (36 3 °C), Max:99 1 °F (37 3 °C)  Current: Temperature: 98 4 °F (36 9 °C)    Physical Exam:     General: Awake, alert, cooperative, no distress  Minimal confusion  Neck:  Supple  No mass  No lymphadenopathy  Lungs: Expansion symmetric, no rales, no wheezing, respirations unlabored  Heart:  Regular rate and rhythm, S1 and S2 normal, no murmur  Abdomen: Soft, mild distention  Wound with VAC in place  No purulence  Minimal tenderness  Extremities: Improving leg edema  No erythema/warmth  No ulcer  Nontender to palpation  Skin:  No rash  Neuro: Moves all extremities  Invasive Devices     Central Venous Catheter Line  Duration           Port A Cath 03/10/22 Right Chest 261 days          Peripheral Intravenous Line  Duration           Peripheral IV 22 Right;Upper;Ventral (anterior) Arm 2 days          Drain  Duration           Closed/Suction Drain Left Abdomen Bulb 19 Fr  10 days    Ileostomy LUQ 9 days                Labs studies:   I have personally reviewed pertinent labs  Results from last 7 days   Lab Units 22  0506 22  0512 22  0459 22  1436 22  0517   POTASSIUM mmol/L 3 6 3 7 4 0   < > 4 0   CHLORIDE mmol/L 123* 121* 127*   < > 124*   CO2 mmol/L 22 23 22   < > 25   BUN mg/dL 24 27* 30*   < > 36*   CREATININE mg/dL 1 34* 1 39* 1 37*   < > 1 42*   EGFR ml/min/1 73sq m 57 55 56   < > 54   CALCIUM mg/dL 8 1* 8 2* 7 9*   < > 7 8*   AST U/L  --   --  67*  --  68*   ALT U/L  --   --  19  --  19   ALK PHOS U/L  --   --  436*  --  467*    < > = values in this interval not displayed       Results from last 7 days   Lab Units 11/27/22  0618 11/27/22  0506 11/26/22  0512 11/25/22  0459   WBC Thousand/uL  --  12 84* 11 09* 11 82*   HEMOGLOBIN g/dL 6 9* 6 8* 7 5* 7 1*   PLATELETS Thousands/uL  --  448* 508* 527*           Imaging Studies:   I have personally reviewed pertinent imaging study reports and images in PACS  EKG, Pathology, and Other Studies:   I have personally reviewed pertinent reports

## 2022-11-27 NOTE — CASE MANAGEMENT
Case Management Discharge Planning Note    Patient name Karlee Moreau  Location [facility] 205/[facility] 204-11 MRN [id number]  : 1965 Date 2022       Current Admission Date: 2022  Current Admission Diagnosis:Colon cancer metastasized to liver [facility])   Patient Active Problem List    Diagnosis Date Noted   • Flash pulmonary edema ([facility] ) 2022   • MR (mitral regurgitation) 2022   • Bacteremia 2022   • ESBL (extended spectrum beta-lactamase) producing bacteria infection 2022   • Acute respiratory failure with hypoxia ([facility] ) 2022   • Encephalopathy 2022   • Cervical radiculopathy 10/26/2022   • Other fatigue 06/15/2022   • Colostomy prolapse ([facility] ) 2022   • Colon cancer metastasized to liver ([facility] ) 2022   • 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 liver ([facility] ) 2022   • Iron deficiency anemia, unspecified 2022   • Malignant neoplasm of transverse colon ([facility] ) 2022   • Thrombocytosis 2022   • Hypokalemia 2022   • Transaminitis 2022   • Type 2 diabetes mellitus with hyperlipidemia ([facility] ) 2022   • Colonic mass 2022   • Microcytic anemia 2022   • Left ureteral calculus 2020   • Incarcerated umbilical hernia    • Testicular hypogonadism 2017   • Low testosterone 2017   • Type 2 diabetes mellitus without complication, with long-term current use of insulin ([facility] ) 2016   • Benign essential hypertension 2016   • Mixed hyperlipidemia 2016   • Erectile dysfunction 2016   • Obesity (BMI 30-39 9) 2016      LOS (days): 20  Geometric Mean LOS (GMLOS) (days): 9 80  Days to GMLOS:-10 3     OBJECTIVE:  Risk of Unplanned Readmission Score: 37 45         Current admission status: Inpatient   Preferred Pharmacy:   [facility]pharm[facility] #[id number] [facility] [address] [place] [address] Drive  [address] [place] [address]  Phone: [phone] Fax: [address] [place] [address]  [address]  [facility] 3  [address] [place] [address]  Phone: [phone] Fax: [phone]    [id number] ANJELICA [place]BOS - [address] [facility] [address]  Phone: [phone] Fax: [phone]    Primary Care Provider: Ana Senior MD    Primary Insurance: [employer]  Secondary Insurance:     Discharge planning discussed with[de-identified] Pt spouse, Vika  Freedom of Choice: Yes  Comments - Freedom of Choice: Choice offered in the interest of dc planning - PT  recommending STR - spouse agreeable to blanket referrals being placed for review and availability  CM contacted family/caregiver?: Yes  Were Treatment Team discharge recommendations reviewed with patient/caregiver?: Yes  Did patient/caregiver verbalize understanding of patient care needs?: Yes  Were patient/caregiver advised of the risks associated with not following Treatment Team discharge recommendations?: Yes    Contacts  Patient Contacts: Vika  Relationship to Patient[de-identified] Family  Contact Method: Phone  Phone Number: [phone]  Reason/Outcome: Emergency Contact      Other Referral/Resources/Interventions Provided:  Referral Comments: PT recs STR, blanket referrals placed in [address], pending review      Treatment Team Recommendation: Short Term Rehab  Discharge Destination Plan[de-identified] Short Term Rehab

## 2022-11-27 NOTE — PROGRESS NOTES
Progress Note - Surgical Oncology   Demetrio Zamora 61 y o  male MRN: [id number]  Unit/Bed#: [facility] 205-01 Encounter: [id number]    Assessment:  60 y/o M w metastatic colon ca s/p transverse colectomy & partial hepatectomy 11/7/22 c/b anastomotic leak, ex lap, R hemicolectomy 11/16/22 and loop ileostomy 11/17       AVSS on RA  Urine 1 2 L  Vac 125 from 100 tan  VICTORINO left 0 from 15  Stool 1450    White count 11 from 12  Hemoglobin 8 from 6 9 status post 1 transfusion  Glucose 174 from 111  Creatinine 1 27 from 1 34  Sodium 145 from 150         Plan:  Follow hypernatremia, improved this morning  Continue diet  Continue ensure nutrition supplements  Insulin gtt for hyperglycemia, appreciate endocrine recs  Ambulate  Pt/ot  Case mgt rehab planning  [facility] change Monday Wednesday Friday      Subjective/Objective     Subjective:   No nausea  No vomiting  Ostomy functioning  Some fatigue  Some weakness  Mental status improved for me this morning  Objective:     Blood pressure 145/77, pulse 90, temperature (!) 97 4 °F (36 3 °C), temperature source Oral, resp  rate 18, height 5' 8" (1 727 m), weight 114 kg (251 lb 8 7 oz), SpO2 94 %  ,Body mass index is 38 25 kg/m²        Intake/Output Summary (Last 24 hours) at 11/27/2022 1544  Last data filed at 11/27/2022 1525  Gross per 24 hour   Intake 1897 19 ml   Output 2360 ml   Net -462 81 ml       Invasive Devices     Central Venous Catheter Line  Duration           Port A Cath 03/10/22 Right Chest 262 days          Peripheral Intravenous Line  Duration           Peripheral IV 11/24/22 Right;Upper;Ventral (anterior) Arm 3 days    Peripheral IV 11/27/22 Left;Proximal;Ventral (anterior) Forearm <1 day          Drain  Duration           Closed/Suction Drain Left Abdomen Bulb 19 Fr  11 days    Ileostomy LUQ 10 days                Physical Exam:   Gen: NAD, Comfortable  Neuro: A&O, No focal deficits  Head: Normal Cephalic, Atraumatic  Eye: EOMI, PERRLA, No scleral icterus  Neck: Supple, No JVD, Midline trachea  CV: RRR, Cap refill <2 sec  Pulm: Normal work of breathing, no respiratory distress  Abd: Soft, Non-Distended, mild Tender  Ext: No edema, Non-tender  Skin: warm, dry, intact

## 2022-11-27 NOTE — PROGRESS NOTES
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 who presented to the hospital for ex lap, transverse colon resection, segment 3 liver resection, ablation, reversal of colostomy on 11/07, hospital course complicated with left upper quadrant hematoma and ESBL bacteremia   Endocrinology consulted for the management of type 2 diabetes mellitus in the setting of liver metastases  Currently pt off of tube feeds    Objective:     Vitals: Blood pressure 145/77, pulse 90, temperature (!) 97 4 °F (36 3 °C), temperature source Oral, resp  rate 18, height 5' 8" (1 727 m), weight 114 kg (251 lb 8 7 oz), SpO2 94 %  ,Body mass index is 38 25 kg/m²  Intake/Output Summary (Last 24 hours) at 11/27/2022 1152  Last data filed at 11/27/2022 1112  Gross per 24 hour   Intake 2049 52 ml   Output 1615 ml   Net 434 52 ml       Physical Exam:  General Appearance: awake, appears stated age and cooperative  Head: Normocephalic, without obvious abnormality, atraumatic  Extremities: moves all extremities  Skin: Skin color and temperature normal    Pulm: no labored breathing        POC Glucose (mg/dl)   Date Value   11/27/2022 176 (H)   11/27/2022 147 (H)   11/27/2022 136   11/27/2022 106   11/27/2022 125   11/27/2022 114   11/26/2022 105   11/26/2022 101   11/26/2022 125   11/26/2022 115     Assessment and plan:     Type 2 diabetes mellitus with hyperglycemia  HbA1c 8 0 September 2022  Home regimen:  Januvia 100 mg daily, metformin glyburide 5-500 mg QD, CGM Parrish  Inpatient regimen:   Lantus 10 units at bedtime, correctional scale algo with meals and at bedtime     Recommendations:  Currently pt is off of tube feeds     Change in mental status pt is disoriented  Pt was on Lantus 22 units bedtime and lispro 5 units t i d  With meals before insulin drip was started yesterday ( 11/26) now glucose levels under control  Recommend to treat volume depletion, consider to give blood transfusion ( hgb 6 9) to improve intravascular volume  That might help with SQ insulin absorption            Portions of the record may have been created with voice recognition software

## 2022-11-27 NOTE — PLAN OF CARE
Problem: INFECTION - ADULT  Goal: Absence or prevention of progression during hospitalization  Description: INTERVENTIONS:  - Assess and monitor for signs and symptoms of infection  - Monitor lab/diagnostic results  - Monitor all insertion sites, i e  indwelling lines, tubes, and drains  - Monitor endotracheal if appropriate and nasal secretions for changes in amount and color  - Steinauer appropriate cooling/warming therapies per order  - Administer medications as ordered  - Instruct and encourage patient and family to use good hand hygiene technique  - Identify and instruct in appropriate isolation precautions for identified infection/condition  Outcome: Progressing

## 2022-11-27 NOTE — PROGRESS NOTES
Progress Note - Martín Agent 61 y o  male MRN: [id number]    Unit/Bed#: [facility]U 205-01 Encounter: [id number]      Assessment:  60 y/o M w metastatic colon ca s/p transverse colectomy & partial hepatectomy 11/7/22 c/b anastomotic leak, ex lap, R hemicolectomy 11/16/22 and loop ileostomy 11/17  Vss  Afebrile  tmax 99 1  Abdomen soft, nontender, nondistended  Vac midline good seal  No leak  100cc tan output  UOP: 1 7L  L VICTORINO: 15 cc serosang    Labs:   Wbc: 11-> 12  Hgb: 7 1-> 6 8  Cr: 1 39-> 1 34  Na: 148-> 150    Plan:  Recheck hgb this morning  Follow hypernatremia, nephrology consult  Continue diet  Continue ensure nutrition supplements  Insulin gtt for hyperglycemia, appreciate endocrine recs  Ambulate  Pt/ot  Case mgt rehab planning    Subjective:   Feels much better today  Ate all of his meals yesterday  Denied any abdominal pain  Ambulated short distance and spent most of day in chair  Denied fever, chills, chest pain, shortness of breath, nausea, vomiting, or abdominal pain this morning  Objective:     Vitals: Blood pressure 152/74, pulse 98, temperature 98 8 °F (37 1 °C), temperature source Oral, resp  rate 18, height 5' 8" (1 727 m), weight 114 kg (251 lb 8 7 oz), SpO2 94 %  ,Body mass index is 38 25 kg/m²  Intake/Output Summary (Last 24 hours) at 11/27/2022 0557  Last data filed at 11/27/2022 0501  Gross per 24 hour   Intake 1858 ml   Output 2566 ml   Net -708 ml       Physical Exam  General: NAD  HEENT: NC/AT  MMM  Cv: RRR  Lungs: normal effort  Ab: Soft, NT/ND  Ostomy functional  L VICTORINO seroang  Midline vac in place, good seal, no leak     Ex: no CCE  Neuro: AAOx3    Scheduled Meds:  Current Facility-Administered Medications   Medication Dose Route Frequency Provider Last Rate   • acetaminophen  650 mg Oral Q6H [facility] Katerin Kothari DO     • amLODIPine  5 mg Oral BID Ruby Jolley MD     • atorvastatin  40 mg Per NG Tube After Dinner Maggy Villatoro PA-C     • chlorhexidine 15 mL Mouth/Throat Q12H [address] GAURANG Acharya     • cyanocobalamin  500 mcg Per NG Tube Daily Maggy Villatoro PA-C     • DULoxetine  60 mg Oral Daily GAURANG Jackson     • folic acid  1 mg Per NG Tube Daily GAURANG Jackson     • gabapentin  100 mg Oral BID GAURANG Jackson     • heparin (porcine)  5,000 Units Subcutaneous Q8H [address] GAURANG Acharya     • HYDROmorphone  0 3 mg Intravenous Q3H PRN GAURANG Felix     • insulin regular (HumuLIN R,NovoLIN R) infusion  0 3-21 Units/hr Intravenous Titrated Juan Monsivais MD 1 Units/hr (11/27/22 3360)   • melatonin  6 mg Oral HS GAURANG Jackson     • naloxone  0 04 mg Intravenous Q1MIN PRN Roly Stanford PA-C     • ondansetron  4 mg Intravenous Q4H PRN GAURANG Jackson     • oxyCODONE  5 mg Oral Q4H PRN Marian Mas MD      Or   • oxyCODONE  10 mg Oral Q4H PRN Marian Mas MD     • oxyCODONE  5 mg Oral Q6H Bishop Efren MD     • pantoprazole  40 mg Intravenous Q12H [address], MD     • QUEtiapine  25 mg Oral HS Nan Calixto PA-C     • thiamine  100 mg Intravenous [facility] NANETTE Calixto 100 mL/hr at 11/27/22 0353     Continuous Infusions:insulin regular (HumuLIN R,NovoLIN R) infusion, 0 3-21 Units/hr, Last Rate: 1 Units/hr (11/27/22 0512)      PRN Meds: •  HYDROmorphone  •  naloxone  •  ondansetron  •  oxyCODONE **OR** oxyCODONE      Invasive Devices     Central Venous Catheter Line  Duration           Port A Cath 03/10/22 Right Chest 261 days          Peripheral Intravenous Line  Duration           Peripheral IV 11/24/22 Right;Upper;Ventral (anterior) Arm 2 days          Drain  Duration           Closed/Suction Drain Left Abdomen Bulb 19 Fr  10 days    Ileostomy LUQ 9 days                Lab, Imaging and other studies: I have personally reviewed pertinent reports      VTE Pharmacologic Prophylaxis: Sequential compression device (Venodyne)   VTE Mechanical Prophylaxis: sequential compression device

## 2022-11-28 LAB
ABO GROUP BLD BPU: NORMAL
ANION GAP SERPL CALCULATED.3IONS-SCNC: 4 MMOL/L (ref 4–13)
BASOPHILS # BLD AUTO: 0.03 THOUSANDS/ÂΜL (ref 0–0.1)
BASOPHILS NFR BLD AUTO: 0 % (ref 0–1)
BPU ID: NORMAL
BUN SERPL-MCNC: 26 MG/DL (ref 5–25)
CALCIUM SERPL-MCNC: 8.3 MG/DL (ref 8.3–10.1)
CHLORIDE SERPL-SCNC: 119 MMOL/L (ref 96–108)
CO2 SERPL-SCNC: 22 MMOL/L (ref 21–32)
CREAT SERPL-MCNC: 1.27 MG/DL (ref 0.6–1.3)
CROSSMATCH: NORMAL
EOSINOPHIL # BLD AUTO: 0 THOUSAND/ÂΜL (ref 0–0.61)
EOSINOPHIL NFR BLD AUTO: 0 % (ref 0–6)
ERYTHROCYTE [DISTWIDTH] IN BLOOD BY AUTOMATED COUNT: 15.9 % (ref 11.6–15.1)
GFR SERPL CREATININE-BSD FRML MDRD: 61 ML/MIN/1.73SQ M
GLUCOSE SERPL-MCNC: 142 MG/DL (ref 65–140)
GLUCOSE SERPL-MCNC: 146 MG/DL (ref 65–140)
GLUCOSE SERPL-MCNC: 149 MG/DL (ref 65–140)
GLUCOSE SERPL-MCNC: 151 MG/DL (ref 65–140)
GLUCOSE SERPL-MCNC: 156 MG/DL (ref 65–140)
GLUCOSE SERPL-MCNC: 157 MG/DL (ref 65–140)
GLUCOSE SERPL-MCNC: 157 MG/DL (ref 65–140)
GLUCOSE SERPL-MCNC: 162 MG/DL (ref 65–140)
GLUCOSE SERPL-MCNC: 162 MG/DL (ref 65–140)
GLUCOSE SERPL-MCNC: 174 MG/DL (ref 65–140)
GLUCOSE SERPL-MCNC: 174 MG/DL (ref 65–140)
GLUCOSE SERPL-MCNC: 182 MG/DL (ref 65–140)
GLUCOSE SERPL-MCNC: 183 MG/DL (ref 65–140)
GLUCOSE SERPL-MCNC: 226 MG/DL (ref 65–140)
HCT VFR BLD AUTO: 25.2 % (ref 36.5–49.3)
HGB BLD-MCNC: 8 G/DL (ref 12–17)
IMM GRANULOCYTES # BLD AUTO: 0.14 THOUSAND/UL (ref 0–0.2)
IMM GRANULOCYTES NFR BLD AUTO: 1 % (ref 0–2)
LYMPHOCYTES # BLD AUTO: 1.4 THOUSANDS/ÂΜL (ref 0.6–4.47)
LYMPHOCYTES NFR BLD AUTO: 12 % (ref 14–44)
MCH RBC QN AUTO: 29.1 PG (ref 26.8–34.3)
MCHC RBC AUTO-ENTMCNC: 31.7 G/DL (ref 31.4–37.4)
MCV RBC AUTO: 92 FL (ref 82–98)
MONOCYTES # BLD AUTO: 1.17 THOUSAND/ÂΜL (ref 0.17–1.22)
MONOCYTES NFR BLD AUTO: 10 % (ref 4–12)
NEUTROPHILS # BLD AUTO: 9.15 THOUSANDS/ÂΜL (ref 1.85–7.62)
NEUTS SEG NFR BLD AUTO: 77 % (ref 43–75)
NRBC BLD AUTO-RTO: 0 /100 WBCS
PLATELET # BLD AUTO: 433 THOUSANDS/UL (ref 149–390)
PMV BLD AUTO: 10.7 FL (ref 8.9–12.7)
POTASSIUM SERPL-SCNC: 3.9 MMOL/L (ref 3.5–5.3)
RBC # BLD AUTO: 2.75 MILLION/UL (ref 3.88–5.62)
SODIUM SERPL-SCNC: 145 MMOL/L (ref 135–147)
UNIT DISPENSE STATUS: NORMAL
UNIT PRODUCT CODE: NORMAL
UNIT PRODUCT VOLUME: 250 ML
UNIT RH: NORMAL
WBC # BLD AUTO: 11.89 THOUSAND/UL (ref 4.31–10.16)

## 2022-11-28 RX ORDER — INSULIN GLARGINE 100 [IU]/ML
22 INJECTION, SOLUTION SUBCUTANEOUS
Status: DISCONTINUED | OUTPATIENT
Start: 2022-11-28 | End: 2022-12-03 | Stop reason: HOSPADM

## 2022-11-28 RX ORDER — INSULIN LISPRO 100 [IU]/ML
1-5 INJECTION, SOLUTION INTRAVENOUS; SUBCUTANEOUS
Status: DISCONTINUED | OUTPATIENT
Start: 2022-11-29 | End: 2022-12-03 | Stop reason: HOSPADM

## 2022-11-28 RX ORDER — INSULIN LISPRO 100 [IU]/ML
6 INJECTION, SOLUTION INTRAVENOUS; SUBCUTANEOUS
Status: DISCONTINUED | OUTPATIENT
Start: 2022-11-29 | End: 2022-12-03

## 2022-11-28 RX ADMIN — CYANOCOBALAMIN TAB 500 MCG 500 MCG: 500 TAB at 08:49

## 2022-11-28 RX ADMIN — INSULIN GLARGINE 22 UNITS: 100 INJECTION, SOLUTION SUBCUTANEOUS at 21:43

## 2022-11-28 RX ADMIN — OXYCODONE HYDROCHLORIDE 5 MG: 5 SOLUTION ORAL at 01:11

## 2022-11-28 RX ADMIN — ACETAMINOPHEN 650 MG: 325 TABLET ORAL at 01:11

## 2022-11-28 RX ADMIN — DULOXETINE HYDROCHLORIDE 60 MG: 60 CAPSULE, DELAYED RELEASE ORAL at 08:48

## 2022-11-28 RX ADMIN — ATORVASTATIN CALCIUM 40 MG: 40 TABLET, FILM COATED ORAL at 17:46

## 2022-11-28 RX ADMIN — HEPARIN SODIUM 5000 UNITS: 5000 INJECTION INTRAVENOUS; SUBCUTANEOUS at 05:40

## 2022-11-28 RX ADMIN — HEPARIN SODIUM 5000 UNITS: 5000 INJECTION INTRAVENOUS; SUBCUTANEOUS at 14:51

## 2022-11-28 RX ADMIN — ACETAMINOPHEN 650 MG: 325 TABLET ORAL at 05:40

## 2022-11-28 RX ADMIN — AMLODIPINE BESYLATE 5 MG: 5 TABLET ORAL at 08:49

## 2022-11-28 RX ADMIN — OXYCODONE HYDROCHLORIDE 5 MG: 5 SOLUTION ORAL at 05:40

## 2022-11-28 RX ADMIN — MELATONIN 6 MG: at 21:44

## 2022-11-28 RX ADMIN — QUETIAPINE FUMARATE 25 MG: 25 TABLET ORAL at 21:44

## 2022-11-28 RX ADMIN — ACETAMINOPHEN 650 MG: 325 TABLET ORAL at 17:46

## 2022-11-28 RX ADMIN — GABAPENTIN 100 MG: 100 CAPSULE ORAL at 17:46

## 2022-11-28 RX ADMIN — PANTOPRAZOLE SODIUM 40 MG: 40 INJECTION, POWDER, FOR SOLUTION INTRAVENOUS at 21:44

## 2022-11-28 RX ADMIN — OXYCODONE HYDROCHLORIDE 5 MG: 5 SOLUTION ORAL at 12:54

## 2022-11-28 RX ADMIN — SODIUM CHLORIDE 3 UNITS/HR: 9 INJECTION, SOLUTION INTRAVENOUS at 01:50

## 2022-11-28 RX ADMIN — CHLORHEXIDINE GLUCONATE 15 ML: 1.2 SOLUTION ORAL at 08:48

## 2022-11-28 RX ADMIN — GABAPENTIN 100 MG: 100 CAPSULE ORAL at 08:49

## 2022-11-28 RX ADMIN — HEPARIN SODIUM 5000 UNITS: 5000 INJECTION INTRAVENOUS; SUBCUTANEOUS at 21:44

## 2022-11-28 RX ADMIN — AMLODIPINE BESYLATE 5 MG: 5 TABLET ORAL at 17:47

## 2022-11-28 RX ADMIN — PANTOPRAZOLE SODIUM 40 MG: 40 INJECTION, POWDER, FOR SOLUTION INTRAVENOUS at 08:49

## 2022-11-28 RX ADMIN — CHLORHEXIDINE GLUCONATE 15 ML: 1.2 SOLUTION ORAL at 21:44

## 2022-11-28 RX ADMIN — ACETAMINOPHEN 650 MG: 325 TABLET ORAL at 12:54

## 2022-11-28 RX ADMIN — OXYCODONE HYDROCHLORIDE 5 MG: 5 SOLUTION ORAL at 18:06

## 2022-11-28 RX ADMIN — FOLIC ACID 1 MG: 1 TABLET ORAL at 08:48

## 2022-11-28 RX ADMIN — HYDROMORPHONE HYDROCHLORIDE 0.3 MG: 1 INJECTION, SOLUTION INTRAMUSCULAR; INTRAVENOUS; SUBCUTANEOUS at 11:50

## 2022-11-28 NOTE — UTILIZATION REVIEW
Continued Stay Review    Date: 11/27/22                          Current Patient Class: Inpatient  Current Level of Care: Med Surg    HPI:57 y o  male initially admitted on 11/7     Assessment/Plan:  Awake, more alert today  Pain appears to be controlled  Tolerating diet  Ileostomy is functioning  Wound VAC in place  Continue aggressive pulmonary toilet, DVT prophylaxis  Out of bed to chair, PT OT    Vital Signs:     Date/Time Temp Pulse Resp BP MAP (mmHg) SpO2 O2 Device   11/28/22 0849 -- -- -- 146/79 -- -- --   11/28/22 0740 -- 82 -- 138/81 100 94 % None (Room air)   11/28/22 0545 -- 86 -- 153/75 101 95 % --   11/28/22 0305 -- 86 -- 132/72 86 93 % --   11/27/22 2301 98 7 °F (37 1 °C) 92 -- 138/86 107 93 % None (Room air)   11/27/22 2300 -- 94 -- 138/86 107 94 % --   11/27/22 1850 98 1 °F (36 7 °C) 98 22 167/80 120 94 % --   11/27/22 1619 98 3 °F (36 8 °C) 92 18 153/69 -- 94 % None (Room air)   11/27/22 1600 98 3 °F (36 8 °C)  92  18  141/76  105 93 %  None (Room air)   11/27/22 1101 97 4 °F (36 3 °C) Abnormal  90 18 145/77 98 94 % None (Room air)   11/27/22 0742 98 4 °F (36 9 °C) 90 -- 136/68 88 93 % None (Room air)   11/27/22 0510 98 8 °F (37 1 °C) 98 -- 152/74 100 94 % --   11/27/22 0256 99 1 °F (37 3 °C) -- -- -- -- -- --   11/26/22 2310 -- 92 -- 147/78 105 94 % --   11/26/22 2017 99 °F (37 2 °C) 95 -- 151/76 99 93 % None (Room air)   11/26/22 2010 -- 94 -- 151/76 99 94 % --   11/26/22 1519 98 4 °F (36 9 °C) 95 -- 140/75 94 94 % None (Room air)   11/26/22 1500 -- 100 -- -- -- -- --   11/26/22 1114 97 4 °F (36 3 °C) Abnormal  92 18 150/76 114 96 % None (Room air)   11/26/22 0800 98 4 °F (36 9 °C) 82 18 137/74 99 91 % None (Room air)   11/26/22 0730 -- 88 -- 133/73 92 91 % --   11/26/22 0338 100 1 °F (37 8 °C) 92 20 134/69 91 94 % None (Room air)       Pertinent Labs/Diagnostic Results:     11/25 EKG:  Sinus tachycardia  Right bundle branch block  Inferior infarct (cited on or before 23-NOV-2022)  Abnormal ECG    11/23 EKG:  Normal sinus rhythm  Right bundle branch block  Cannot rule out Inferior infarct , age undetermined  Abnormal ECG    11/21 CT abd/pelvis:  IMPRESSION:     No evidence of pulmonary embolus      Moderate right and small left pleural effusions with associated dependent lower lobe atelectasis      Interval evacuation of previously identified intra-abdominal hematoma and previously noted extraluminal collection with placement of drainage catheters  No evidence of residual organized collection identified  Small volume of ascites present      Stable appearance of hepatic metastatic disease as well as postoperative changes involving the liver  11/21 CXR:  IMPRESSION:  Evidence of edema and pleural effusions      Low lung volumes with atelectasis  11/18 CXR:  IMPRESSION:     Increasing left pleural effusion with associated atelectasis/consolidation  Lines and tubes in satisfactory position        Results from last 7 days   Lab Units 11/28/22  0440 11/27/22  0618 11/27/22  0506 11/26/22  0512 11/25/22  0459   WBC Thousand/uL 11 89*  --  12 84* 11 09* 11 82*   HEMOGLOBIN g/dL 8 0* 6 9* 6 8* 7 5* 7 1*   HEMATOCRIT % 25 2* 22 2* 21 9* 25 5* 24 4*   PLATELETS Thousands/uL 433*  --  448* 508* 527*   NEUTROS ABS Thousands/µL 9 15*  --  10 20* 8 73* 9 56*         Results from last 7 days   Lab Units 11/28/22  0440 11/27/22  0506 11/26/22  0512 11/25/22  0459 11/24/22  1436 11/24/22  0517 11/23/22  0531 11/22/22  1755 11/22/22  0606   SODIUM mmol/L 145 150* 148* 153* 153* 153* 149*   < > 149*   POTASSIUM mmol/L 3 9 3 6 3 7 4 0 4 0 4 0 4 5   < > 4 1   CHLORIDE mmol/L 119* 123* 121* 127* 125* 124* 122*   < > 120*   CO2 mmol/L 22 22 23 22 25 25 23   < > 25   ANION GAP mmol/L 4 5 4 4 3* 4 4   < > 4   BUN mg/dL 26* 24 27* 30* 33* 36* 42*   < > 47*   CREATININE mg/dL 1 27 1 34* 1 39* 1 37* 1 39* 1 42* 1 59*   < > 1 61*   EGFR ml/min/1 73sq m 61 57 55 56 55 54 47   < > 46   CALCIUM mg/dL 8 3 8 1* 8 2* 7 9* 8 0* 7  8* 8 0*   < > 7 8*   MAGNESIUM mg/dL  --   --   --  2 3  --  2 5 2 6  --  2 7*   PHOSPHORUS mg/dL  --   --   --  3 3  --  3 1 3 8  --  3 3    < > = values in this interval not displayed       Results from last 7 days   Lab Units 11/25/22  0459 11/24/22  0517   AST U/L 67* 68*   ALT U/L 19 19   ALK PHOS U/L 436* 467*   TOTAL PROTEIN g/dL 6 8 6 4   ALBUMIN g/dL 1 5* 1 6*   TOTAL BILIRUBIN mg/dL 1 93* 2 04*     Results from last 7 days   Lab Units 11/28/22  0905 11/28/22  0728 11/28/22  0513 11/28/22  0250 11/28/22  0116 11/27/22  2311 11/27/22  2111 11/27/22  1913 11/27/22  1708 11/27/22  1515 11/27/22  1306 11/27/22  1102   POC GLUCOSE mg/dl 162* 151* 156* 183* 226* 193* 207* 174* 174* 180* 171* 176*     Results from last 7 days   Lab Units 11/28/22  0440 11/27/22  0506 11/26/22  0512 11/25/22  0459 11/24/22  1436 11/24/22  0517 11/23/22  0531 11/22/22  1755 11/22/22  0606   GLUCOSE RANDOM mg/dL 174* 111 285* 227* 252* 273* 243* 207* 207*         Results from last 7 days   Lab Units 11/23/22  0531   PROCALCITONIN ng/ml 1 54*         Medications:   Scheduled Medications:  acetaminophen, 650 mg, Oral, Q6H SHANNON  amLODIPine, 5 mg, Oral, BID  atorvastatin, 40 mg, Per NG Tube, After Dinner  chlorhexidine, 15 mL, Mouth/Throat, Q12H [address]  cyanocobalamin, 500 mcg, Per NG Tube, Daily  DULoxetine, 60 mg, Oral, Daily  folic acid, 1 mg, Per NG Tube, Daily  gabapentin, 100 mg, Oral, BID  heparin (porcine), 5,000 Units, Subcutaneous, Q8H [address]  melatonin, 6 mg, Oral, HS  oxyCODONE, 5 mg, Oral, Q6H  pantoprazole, 40 mg, Intravenous, Q12H [address]  QUEtiapine, 25 mg, Oral, HS      Continuous IV Infusions:  insulin regular (HumuLIN R,NovoLIN R) infusion, 0 3-21 Units/hr, Intravenous, Titrated      PRN Meds:  HYDROmorphone, 0 3 mg, Intravenous, Q3H PRN  naloxone, 0 04 mg, Intravenous, Q1MIN PRN  ondansetron, 4 mg, Intravenous, Q4H PRN  oxyCODONE, 5 mg, Oral, Q4H PRN   Or  oxyCODONE, 10 mg, Oral, Q4H PRN        Discharge Plan: TBD    Network Utilization Review Department  ATTENTION: Please call with any questions or concerns to [phone] and carefully listen to the prompts so that you are directed to the right person  All voicemails are confidential   Kiara Everett all requests for admission clinical reviews, approved or denied determinations and any other requests to dedicated fax number below belonging to the campus where the patient is receiving treatment   List of dedicated fax numbers for the Facilities:  [address] DENIALS (Administrative/Medical Necessity) [phone]   [address] (Maternity/NICU/Pediatrics) [phone]   918 Ericka Pinto [phone]   [place] [address] [phone]   [address] [address] [facility] [phone]   [facility] 134 815 [facility] [phone]

## 2022-11-28 NOTE — PLAN OF CARE
Problem: PHYSICAL THERAPY ADULT  Goal: Performs mobility at highest level of function for planned discharge setting  See evaluation for individualized goals  Description: Treatment/Interventions: ADL retraining, Functional transfer training, LE strengthening/ROM, Endurance training, Patient/family training, Equipment eval/education, Bed mobility, Gait training, Spoke to nursing, Spoke to case management          See flowsheet documentation for full assessment, interventions and recommendations  Outcome: Not Progressing  Note: Prognosis: Guarded  Problem List: Decreased strength, Decreased range of motion, Decreased endurance, Impaired balance, Decreased mobility, Decreased coordination, Decreased cognition, Impaired judgement, Decreased safety awareness, Pain, Decreased skin integrity  Assessment: Patient demonstrated improved ability to mobilize lower extremities during bed mobility the session  Continues to require increased time for all mobility with decreased strength and activity tolerance  Patient required assistance with upper extremity support to upright trunk during bed mobility  Required frequent instruction for sitting upright posture with frequent posteriorly secondary to pain  Required increased assistance for stand pivot transfer to chair with increased pain and decreased strength  Patient will benefit from continued inpatient skilled physical therapy to maximize functional mobility and safety  Barriers to Discharge: Inaccessible home environment, Decreased caregiver support     PT Discharge Recommendation: Post acute rehabilitation services    See flowsheet documentation for full assessment

## 2022-11-28 NOTE — PROGRESS NOTES
Progress note - Palliative and Supportive Care   Dwayne Hodges 61 y o  male [id number]    Patient Active Problem List   Diagnosis   • Type 2 diabetes mellitus without complication, with long-term current use of insulin (HCC)   • Benign essential hypertension   • Mixed hyperlipidemia   • Erectile dysfunction   • Low testosterone   • Obesity (BMI 30-39  9)   • Testicular hypogonadism   • Incarcerated umbilical hernia   • Left ureteral calculus   • Type 2 diabetes mellitus with hyperlipidemia (HCC)   • Colonic mass   • Microcytic anemia   • Hypokalemia   • Transaminitis   • Thrombocytosis   • Iron deficiency anemia, unspecified   • Malignant neoplasm of transverse colon (HCC)   • 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)   • Colon cancer metastasized to liver [facility])   • Colostomy prolapse (HCC)   • Other fatigue   • Cervical radiculopathy   • Encephalopathy   • Flash pulmonary edema (HCC)   • MR (mitral regurgitation)   • Bacteremia   • ESBL (extended spectrum beta-lactamase) producing bacteria infection   • Acute respiratory failure with hypoxia (HCC)     Active issues specifically addressed today include:   · Metastatic colon cancer status post transverse colectomy on 11/07, followed by right hemicolectomy on 11/16 and loop ileostomy on 11/17   · Cancer associated pain   · Nausea -resolved  · Insomnia-improved  · Palliative care patient    Plan:  1  Symptom management -   Cancer associated pain  • Acetaminophen 650 mg p o  q 6h scheduled  • Duloxetine 60 mg daily  • Gabapentin 100 mg b i d   • Oxy IR 5 mg p o  q 6h scheduled  • Oxy IR 5-10 mg q 4 hours p r n  moderate-to-severe pain  • Dilaudid 0 3 mg IV q 3 hours p r n  breakthrough pain  Nausea  • Zofran 4 mg IV q 4 hours p r n  Insomnia  • Melatonin 6 mg HS   • Seroquel 25 mg HS    2  Goals -   • Ongoing disease directed cares without limits at this time     Code Status:  Full - Level 1   Decisional apparatus:  Patient is competent on my exam today  If competence is lost, patient's substitute decision maker would default to spouse by [place]  Advance Directive / Living Will / POLST:  Not on file    3  Social support-  • Patient is supported by his spouse of 20 years, Vika, along with their 2 children Mark Anthony Patel in [address] [place] [address] Place following  • 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    4  Follow-up  • Palliative care remains available for symptom management assistance if needed   • Recommend outpatient follow-up when cleared for discharge  Interval history:       24H Pain History  - scores 0-6/10 in past 24H  - Location:  Lower abdomen  - Quality:  Constant  - Alleviation:  Current regimen  - Exacerbation:  Closer to bedtime  - 0 PRNs  - OME 30      No events overnight  Patient with intermittent mild confusion, endocrine managing insulin drip, hypernatremia improved this morning from 150-145  We will have VAC changed by surgery team today  Patient was reportedly more confused during a visit earlier the day  However, at time of evaluation he was confused to time and was somewhat tangential with unrelated topics  However, was able to coherently tell the story of him and his wife meeting, relay some of the day's events, and could self identify his disorientation to time  MEDICATIONS / ALLERGIES:     all current active meds have been reviewed    Allergies   Allergen Reactions   • Shellfish-Derived Products - Food Allergy Anaphylaxis   • Erythromycin GI Intolerance       OBJECTIVE:    Physical Exam  Physical Exam  HENT:      Head: Normocephalic and atraumatic  Eyes:      Conjunctiva/sclera: Conjunctivae normal    Cardiovascular:      Rate and Rhythm: Normal rate  Pulmonary:      Effort: No respiratory distress  Abdominal:      Tenderness: There is no guarding  Musculoskeletal:         General: No swelling  Skin:     General: Skin is warm  Neurological:      Mental Status: He is alert        Comments: Mild confusion Psychiatric:         Mood and Affect: Mood normal          Lab Results:   Results from last 7 days   Lab Units 11/28/22  0440 11/27/22  0618 11/27/22  0506 11/26/22  0512   WBC Thousand/uL 11 89*  --  12 84* 11 09*   HEMOGLOBIN g/dL 8 0* 6 9* 6 8* 7 5*   HEMATOCRIT % 25 2* 22 2* 21 9* 25 5*   PLATELETS Thousands/uL 433*  --  448* 508*   NEUTROS PCT % 77*  --  80* 79*   MONOS PCT % 10  --  9 8     Results from last 7 days   Lab Units 11/28/22  0440 11/27/22  0506 11/26/22  0512 11/25/22  0459 11/24/22  1436 11/24/22  0517   POTASSIUM mmol/L 3 9 3 6 3 7 4 0   < > 4 0   CHLORIDE mmol/L 119* 123* 121* 127*   < > 124*   CO2 mmol/L 22 22 23 22   < > 25   BUN mg/dL 26* 24 27* 30*   < > 36*   CREATININE mg/dL 1 27 1 34* 1 39* 1 37*   < > 1 42*   CALCIUM mg/dL 8 3 8 1* 8 2* 7 9*   < > 7 8*   ALK PHOS U/L  --   --   --  436*  --  467*   ALT U/L  --   --   --  19  --  19   AST U/L  --   --   --  67*  --  68*    < > = values in this interval not displayed  Imaging Studies: reviewed pertinent studies   EKG, Pathology, and Other Studies: reviewed pertinent studies    Counseling / Coordination of Care    Total floor / unit time spent today 35 minutes  Greater than 50% of total time was spent with the patient and / or family counseling and / or coordination of care  A description of the counseling / coordination of care:  Time spent assessing patient, communicating with primary team, pastoral care

## 2022-11-28 NOTE — OCCUPATIONAL THERAPY NOTE
Occupational Therapy Progress Note     Patient Name: Karlee Moreau  BCZSG'W Date: 11/28/2022  Problem List  Principal Problem:    Colon cancer metastasized to liver [facility])  Active Problems:    Benign essential hypertension    Type 2 diabetes mellitus with hyperlipidemia ([facility] [place] [address] )    Malignant neoplasm of transverse colon (HCC)    Encephalopathy    Flash pulmonary edema (HCC)    MR (mitral regurgitation)    Bacteremia    ESBL (extended spectrum beta-lactamase) producing bacteria infection    Acute respiratory failure with hypoxia ([facility] )        11/28/22 1145   OT Last Visit   OT Visit Date 11/28/22   Note Type   Note Type Treatment   Pain Assessment   Pain Assessment Tool 0-10   Pain Score 6   Pain Location/Orientation Location: Abdomen   Hospital Pain Intervention(s) Repositioned; Ambulation/increased activity; Emotional support;Relaxation technique   Restrictions/Precautions   Weight Bearing Precautions Per Order No   Other Precautions Contact/isolation;Pain; Fall Risk;Multiple lines   Lifestyle   Autonomy I adls and mobility - i iadls   Reciprocal Relationships supportive family   Service to Others disability   Intrinsic Gratification 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   ADL   Grooming Assistance 2  Maximal Assistance   UB Bathing Assistance 2  Maximal Assistance   LB Bathing Assistance 2  Maximal Assistance   UB Dressing Assistance 2  Maximal Assistance   LB Dressing Assistance 2  Maximal Assistance   Bed Mobility   Supine to Sit 2  Maximal assistance   Additional items Assist x 2   Transfers   Sit pivot 3  Moderate assistance   Additional items Assist x 2   Subjective   Subjective "I need to take my costume off first"   Cognition   Arousal/Participation Arousable; Cooperative   Attention Attends with cues to redirect   Orientation Level Oriented to person;Oriented to place; Disoriented to time   Memory Decreased short term memory;Decreased recall of recent events;Decreased recall of precautions Following Commands Follows one step commands with increased time or repetition   Comments Noted to make inappropriate comments at times - believed he had socks on but was bare foot, stating he needs to take his costume off   Activity Tolerance   Activity Tolerance Patient limited by fatigue;Patient limited by pain;Treatment limited secondary to medical complications (Comment)   Assessment   Assessment Pt seen for OT session focusing on self care, functional transfers, cognition and overall tolerance to activity - sleeping on approach, arousable but lethargic - episodes of confusion noted - functionally requires max a for adls and mod a x 2 for transfers and bed mobility 2* weakness, pain, impaired balance and limited overall tolerance to activity - noted to be tearful at end of session - encouragement provided - continue to recommend inpt rehab when medically cleared- OT to continue to follow to address goals as stated on eval   Plan   Treatment Interventions ADL retraining;Functional transfer training;UE strengthening/ROM; Endurance training;Patient/family training;Equipment evaluation/education; Compensatory technique education; Energy conservation; Activityengagement   Goal Expiration Date 12/07/22   OT Treatment Day 3   OT Frequency Other (comment)  (2-4x/wk)   Recommendation   OT Discharge Recommendation Post acute rehabilitation services   AM-PAC Daily Activity Inpatient   Lower Body Dressing 2   Bathing 2   Toileting 2   Upper Body Dressing 2   Grooming 2   Eating 3   Daily Activity Raw Score 13   Daily Activity Standardized Score (Calc for Raw Score >=11) 32 03   AM-PAC Applied Cognition Inpatient   Following a Speech/Presentation 3   Understanding Ordinary Conversation 4   Taking Medications 3   Remembering Where Things Are Placed or Put Away 3   Remembering List of 4-5 Errands 2   Taking Care of Complicated Tasks 2   Applied Cognition Raw Score 17   Applied Cognition Standardized Score 36 52   End of Consult Patient Position at End of Consult Bedside chair; All needs within reach   End of Consult Comments surgery arrived at end of session to change vac - pt left oob reclined in chair for same     The patient's raw score on the AM-PAC Daily Activity inpatient short form is 13, standardized score is 32 03, less than 39 4  Patients at this level are likely to benefit from discharge to post-acute rehabilitation services  Please refer to the recommendation of the Occupational Therapist for safe discharge planning        [facility]

## 2022-11-28 NOTE — PROGRESS NOTES
Progress Note - Infectious Disease   Sushant Tenorio 61 y o  male MRN: [id number]  Unit/Bed#: [facility] 205-01 Encounter: [id number]      Impression/Recommendations:  1  Sepsis, secondary to intra-abdominal infection and bacteremia   Patient is clinically much improved on antibiotic and status post I&D/bowel resection   WBC is much decreased, near normal   Temperature is down   Patient completed antibiotic course  Observe off antibiotic  Monitor temperature/WBC  Monitor hemodynamics      2  ESBL producing E coli bacteremia, likely secondary to gut translocation during recent colon surgery   Patient is clinically improved   Bacteremia cleared   Patient completed antibiotic course  No further antibiotic needed for this      3  Anastomotic leak up with intra-abdominal abscess/infected hematoma   Patient is status post exploratory laparotomy, with drainage of abscess and cecum resection   Operative culture growing ESBL producing E coli again, with lactobacillus   Patient had repeat ex lap, with healthy-appearing bowel   Repeat CT with resolved intra-abdominal collections   Patient completed antibiotic course  Observe off for antibiotic  Wound care/VAC per surgery      4  Metastatic colon cancer, with liver metastases   Patient is status post transverse colon resection  Surgical oncology follow-up      5  ALEXY, superimposed on CKD   Creatinine worsen postop but improving again  Monitor creatinine       6  Acute hypoxic respiratory failure, previously on ventilator, but now extubated   No clinical pneumonia  Monitor respiratory status      7  Encephalopathy, multifactorial   Mental status slowly improving  Monitor mental status      8  Type 2 DM      Discussed with patient in detail regarding the above plan      Antibiotics:  Off antibiotic     Subjective:  Patient is comfortable  Minimal abdominal pain  Mental status is quite good  Temperature stays down   No chills    He is tolerating antibiotic well   No nausea, vomiting or diarrhea      Objective:  Vitals:  Temp:  [98 1 °F (36 7 °C)-98 7 °F (37 1 °C)] 98 7 °F (37 1 °C)  HR:  [82-98] 82  Resp:  [18-22] 22  BP: (132-167)/(69-86) 146/79  SpO2:  [93 %-95 %] 94 %  Temp (24hrs), Av 4 °F (36 9 °C), Min:98 1 °F (36 7 °C), Max:98 7 °F (37 1 °C)  Current: Temperature: 98 7 °F (37 1 °C)    Physical Exam:     General: Awake, alert, cooperative, no distress  Neck:  Supple  No mass  No lymphadenopathy  Lungs: Expansion symmetric, no rales, no wheezing, respirations unlabored  Heart:  Regular rate and rhythm, S1 and S2 normal, no murmur  Abdomen: Soft, mild distention  Wound with VAC, without purulence  No erythema/warmth  Minimal tenderness      Extremities: Mild and improved leg edema  No erythema/warmth  No ulcer  Nontender to palpation  Skin:  No rash  Neuro: Moves all extremities  Invasive Devices     Central Venous Catheter Line  Duration           Port A Cath 03/10/22 Right Chest 263 days          Peripheral Intravenous Line  Duration           Peripheral IV 22 Left;Proximal;Ventral (anterior) Forearm <1 day    Peripheral IV 22 Right;Ventral (anterior) Forearm <1 day          Drain  Duration           Closed/Suction Drain Left Abdomen Bulb 19 Fr  11 days    Ileostomy LUQ 10 days                Labs studies:   I have personally reviewed pertinent labs    Results from last 7 days   Lab Units 22  0440 22  0506 22  0512 22  0459 22  1436 22  0517   POTASSIUM mmol/L 3 9 3 6 3 7 4 0   < > 4 0   CHLORIDE mmol/L 119* 123* 121* 127*   < > 124*   CO2 mmol/L 22 22 23 22   < > 25   BUN mg/dL 26* 24 27* 30*   < > 36*   CREATININE mg/dL 1 27 1 34* 1 39* 1 37*   < > 1 42*   EGFR ml/min/1 73sq m 61 57 55 56   < > 54   CALCIUM mg/dL 8 3 8 1* 8 2* 7 9*   < > 7 8*   AST U/L  --   --   --  67*  --  68*   ALT U/L  --   --   --  19  --  19   ALK PHOS U/L  --   --   --  436*  --  467*    < > = values in this interval not displayed  Results from last 7 days   Lab Units 11/28/22  0440 11/27/22  0618 11/27/22  0506 11/26/22  0512   WBC Thousand/uL 11 89*  --  12 84* 11 09*   HEMOGLOBIN g/dL 8 0* 6 9* 6 8* 7 5*   PLATELETS Thousands/uL 433*  --  448* 508*           Imaging Studies:   I have personally reviewed pertinent imaging study reports and images in PACS  EKG, Pathology, and Other Studies:   I have personally reviewed pertinent reports

## 2022-11-28 NOTE — PROGRESS NOTES
Progress Note - Lillie Kaur 61 y o  male MRN: [id number]    Unit/Bed#: [facility] 205-01 Encounter: [id number]      CC: diabetes f/u    Subjective: This is a 61y o -year-old male with past medical history of type 2 diabetes mellitus, hypertension, hyperlipidemia, GERD, iron deficiency anemia , 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 who presented to the hospital for ex lap, transverse colon resection, segment 3 liver resection, ablation, reversal of colostomy on 11/07, hospital course complicated with left upper quadrant hematoma and ESBL bacteremia   Endocrinology consulted for the management of type 2 diabetes mellitus in the setting of liver metastases  Currently pt off of tube feeds    Objective:     Vitals: Blood pressure 163/81, pulse 94, temperature (!) 97 4 °F (36 3 °C), temperature source Oral, resp  rate 22, height 5' 8" (1 727 m), weight 114 kg (251 lb 8 7 oz), SpO2 96 %  ,Body mass index is 38 25 kg/m²        Intake/Output Summary (Last 24 hours) at 11/28/2022 1435  Last data filed at 11/28/2022 1256  Gross per 24 hour   Intake 1751 5 ml   Output 3095 ml   Net -1343 5 ml       Physical Exam:  General Appearance: awake, appears stated age and cooperative  Head: Normocephalic, without obvious abnormality, atraumatic  Extremities: moves all extremities  Skin: Skin color and temperature normal    Pulm: no labored breathing      POC Glucose (mg/dl)   Date Value   11/28/2022 174 (H)   11/28/2022 157 (H)   11/28/2022 162 (H)   11/28/2022 151 (H)   11/28/2022 156 (H)   11/28/2022 183 (H)   11/28/2022 226 (H)   11/27/2022 193 (H)   11/27/2022 207 (H)   11/27/2022 174 (H)        Assessment and plan:     Type 2 diabetes mellitus with hyperglycemia  HbA1c 8 0 September 2022  Home regimen:  Januvia 100 mg daily, metformin glyburide 5-500 mg QD, CGM [place]  Inpatient regimen:   Lantus 10 units at bedtime, correctional scale algo with meals and at bedtime     Recommendations:  Currently pt is off of tube feeds  Status post blood transfusion  Patient looks clinically better more awake and oriented  Currently on  insulin drip  Recommend to bridge with Lantus 22 units at bedtime continue with the insulin drip and then hold it 2 hours after   Recommend to start lispro 6 units t i d  Tomorrow plus correctional scale       Portions of the record may have been created with voice recognition software

## 2022-11-28 NOTE — PLAN OF CARE
Problem: OCCUPATIONAL THERAPY ADULT  Goal: Performs self-care activities at highest level of function for planned discharge setting  See evaluation for individualized goals  Description: Treatment Interventions: ADL retraining, Functional transfer training, UE strengthening/ROM, Endurance training, Patient/family training, Equipment evaluation/education, Compensatory technique education, Energy conservation, Activityengagement          See flowsheet documentation for full assessment, interventions and recommendations     11/28/2022 1352 by Chau Pace OT  Outcome: Progressing  Note: Limitation: Decreased ADL status, Decreased UE ROM, Decreased UE strength, Decreased Safe judgement during ADL, Decreased cognition, Decreased endurance, Decreased self-care trans, Decreased high-level ADLs  Prognosis: Fair  Assessment: Pt seen for OT session focusing on self care, functional transfers, cognition and overall tolerance to activity - sleeping on approach, arousable but lethargic - episodes of confusion noted - functionally requires max a for adls and mod a x 2 for transfers and bed mobility 2* weakness, pain, impaired balance and limited overall tolerance to activity - noted to be tearful at end of session - encouragement provided - continue to recommend inpt rehab when medically cleared- OT to continue to follow to address goals as stated on eval     OT Discharge Recommendation: Post acute rehabilitation services       11/28/2022 1351 by Chau Pace OT  Note: Limitation: Decreased ADL status, Decreased UE ROM, Decreased UE strength, Decreased Safe judgement during ADL, Decreased cognition, Decreased endurance, Decreased self-care trans, Decreased high-level ADLs  Prognosis: Fair  Assessment: Pt seen for OT session focusing on self care, functional transfers, cognition and overall tolerance to activity - sleeping on approach, arousable but lethargic - episodes of confusion noted - functionally requires max a for adls and mod a x 2 for transfers and bed mobility 2* weakness, pain, impaired balance and limited overall tolerance to activity - noted to be tearful at end of session - encouragement provided - continue to recommend inpt rehab when medically cleared- OT to continue to follow to address goals as stated on eval     OT Discharge Recommendation: Post acute rehabilitation services

## 2022-11-28 NOTE — PHYSICAL THERAPY NOTE
PHYSICAL THERAPY NOTE          Patient Name: Karlee Moreau  RBUHW'X Date: 11/28/2022 11/28/22 1146   PT Last Visit   PT Visit Date 11/28/22   Note Type   Note Type Treatment   Pain Assessment   Pain Assessment Tool 0-10   Pain Score 6   Pain Location/Orientation Location: Abdomen   Hospital Pain Intervention(s) Repositioned   Restrictions/Precautions   Weight Bearing Precautions Per Order No   Other Precautions Pain; Fall Risk;Multiple lines; Bed Alarm; Chair Alarm;Cognitive   General   Chart Reviewed Yes   Family/Caregiver Present No   Cognition   Orientation Level Oriented to person   Subjective   Subjective PT willing and agreeable to PT session "I want to get out of bed"   Bed Mobility   Rolling R 2  Maximal assistance   Additional items Assist x 1   Rolling L 2  Maximal assistance   Additional items Assist x 1   Supine to Sit 2  Maximal assistance   Additional items Assist x 2   Sit to Supine Unable to assess   Additional Comments Patient left resting chair, and reach   Transfers   Sit pivot 3  Moderate assistance   Additional items Assist x 2   Ambulation/Elevation   Gait pattern Not appropriate   Balance   Static Sitting Poor -   Dynamic Sitting Poor -   Static Standing Zero   Endurance Deficit   Endurance Deficit Yes   Activity Tolerance   Activity Tolerance Patient limited by pain; Patient limited by fatigue   Medical Staff Made Aware OT   Nurse Made Aware yes   Assessment   Prognosis Guarded   Problem List Decreased strength;Decreased range of motion;Decreased endurance; Impaired balance;Decreased mobility; Decreased coordination;Decreased cognition; Impaired judgement;Decreased safety awareness;Pain;Decreased skin integrity   Assessment Patient demonstrated improved ability to mobilize lower extremities during bed mobility the session    Continues to require increased time for all mobility with decreased strength and activity tolerance  Patient required assistance with upper extremity support to upright trunk during bed mobility  Required frequent instruction for sitting upright posture with frequent posteriorly secondary to pain  Required increased assistance for stand pivot transfer to chair with increased pain and decreased strength  Patient will benefit from continued inpatient skilled physical therapy to maximize functional mobility and safety  Barriers to Discharge Inaccessible home environment;Decreased caregiver support   Goals   Patient Goals to sit in the chair   STG Expiration Date 12/07/22   Plan   Treatment/Interventions OT; Spoke to nursing;Gait training;Bed mobility; Patient/family training; Therapeutic exercise;LE strengthening/ROM; Functional transfer training   Progress Slow progress, decreased activity tolerance   PT Frequency 2-3x/wk   Recommendation   PT Discharge Recommendation Post acute rehabilitation services   AM-PAC Basic Mobility Inpatient   Turning in Bed Without Bedrails 1   Lying on Back to Sitting on Edge of Flat Bed 1   Moving Bed to Chair 1   Standing Up From Chair 1   Walk in Room 1   Climb 3-5 Stairs 1   Basic Mobility Inpatient Raw Score 6   Turning Head Towards Sound 3   Follow Simple Instructions 3   Low Function Basic Mobility Raw Score 12   Low Function Basic Mobility Standardized Score 18 33   Highest Level Of Mobility   -[facility] Goal 2: Bed activities/Dependent transfer       [facility]

## 2022-11-28 NOTE — PROGRESS NOTES
Spiritual Care Progress Note    2022  Patient: Chi Guardian : 1965  Admission Date & Time: 2022 0515  MRN: [id number] CSN: [id number]      [facility] briefly visited with pt while doing rounds on the unit  Pt and  visit was brief due to pt being more confused than usual in this encounter  Pt was in good spirits but spoke about things that were not pertinent to the conversation and saw things that were not there   raised concerns with pt's care team and offered prayer for pt   remains available                 Chaplaincy Interventions Utilized:   Empowerment: Normalized experience of patient/family    Exploration: Explored emotional needs & resources, Explored relational needs & resources, and Explored spiritual needs & resources    Collaboration: Consulted with interdisciplinary team    Relationship Building: Cultivated a relationship of care and support         22 1500   Clinical Encounter Type   Visited With Patient   Routine Visit Follow-up   Crisis Visit Critical Care   Gnosticism Encounters   Gnosticism Needs Prayer

## 2022-11-29 ENCOUNTER — APPOINTMENT (INPATIENT)
Dept: RADIOLOGY | Facility: HOSPITAL | Age: 57
End: 2022-11-29

## 2022-11-29 LAB
ANION GAP SERPL CALCULATED.3IONS-SCNC: 5 MMOL/L (ref 4–13)
BASOPHILS # BLD AUTO: 0.04 THOUSANDS/ÂΜL (ref 0–0.1)
BASOPHILS NFR BLD AUTO: 0 % (ref 0–1)
BUN SERPL-MCNC: 20 MG/DL (ref 5–25)
CALCIUM SERPL-MCNC: 8.1 MG/DL (ref 8.3–10.1)
CHLORIDE SERPL-SCNC: 119 MMOL/L (ref 96–108)
CO2 SERPL-SCNC: 22 MMOL/L (ref 21–32)
CREAT SERPL-MCNC: 1.13 MG/DL (ref 0.6–1.3)
EOSINOPHIL # BLD AUTO: 0 THOUSAND/ÂΜL (ref 0–0.61)
EOSINOPHIL NFR BLD AUTO: 0 % (ref 0–6)
ERYTHROCYTE [DISTWIDTH] IN BLOOD BY AUTOMATED COUNT: 16.1 % (ref 11.6–15.1)
GFR SERPL CREATININE-BSD FRML MDRD: 71 ML/MIN/1.73SQ M
GLUCOSE SERPL-MCNC: 118 MG/DL (ref 65–140)
GLUCOSE SERPL-MCNC: 125 MG/DL (ref 65–140)
GLUCOSE SERPL-MCNC: 150 MG/DL (ref 65–140)
GLUCOSE SERPL-MCNC: 153 MG/DL (ref 65–140)
GLUCOSE SERPL-MCNC: 156 MG/DL (ref 65–140)
GLUCOSE SERPL-MCNC: 157 MG/DL (ref 65–140)
GLUCOSE SERPL-MCNC: 159 MG/DL (ref 65–140)
GLUCOSE SERPL-MCNC: 164 MG/DL (ref 65–140)
GLUCOSE SERPL-MCNC: 167 MG/DL (ref 65–140)
HCT VFR BLD AUTO: 27 % (ref 36.5–49.3)
HGB BLD-MCNC: 8.1 G/DL (ref 12–17)
IMM GRANULOCYTES # BLD AUTO: 0.09 THOUSAND/UL (ref 0–0.2)
IMM GRANULOCYTES NFR BLD AUTO: 1 % (ref 0–2)
LYMPHOCYTES # BLD AUTO: 1.01 THOUSANDS/ÂΜL (ref 0.6–4.47)
LYMPHOCYTES NFR BLD AUTO: 10 % (ref 14–44)
MAGNESIUM SERPL-MCNC: 2.1 MG/DL (ref 1.6–2.6)
MCH RBC QN AUTO: 28.2 PG (ref 26.8–34.3)
MCHC RBC AUTO-ENTMCNC: 30 G/DL (ref 31.4–37.4)
MCV RBC AUTO: 94 FL (ref 82–98)
MONOCYTES # BLD AUTO: 0.86 THOUSAND/ÂΜL (ref 0.17–1.22)
MONOCYTES NFR BLD AUTO: 8 % (ref 4–12)
NEUTROPHILS # BLD AUTO: 8.57 THOUSANDS/ÂΜL (ref 1.85–7.62)
NEUTS SEG NFR BLD AUTO: 81 % (ref 43–75)
NRBC BLD AUTO-RTO: 0 /100 WBCS
PHOSPHATE SERPL-MCNC: 3.5 MG/DL (ref 2.7–4.5)
PLATELET # BLD AUTO: 415 THOUSANDS/UL (ref 149–390)
PMV BLD AUTO: 10.6 FL (ref 8.9–12.7)
POTASSIUM SERPL-SCNC: 3.9 MMOL/L (ref 3.5–5.3)
RBC # BLD AUTO: 2.87 MILLION/UL (ref 3.88–5.62)
SODIUM SERPL-SCNC: 146 MMOL/L (ref 135–147)
WBC # BLD AUTO: 10.57 THOUSAND/UL (ref 4.31–10.16)

## 2022-11-29 RX ORDER — PANTOPRAZOLE SODIUM 40 MG/1
40 TABLET, DELAYED RELEASE ORAL
Status: DISCONTINUED | OUTPATIENT
Start: 2022-11-29 | End: 2022-12-03 | Stop reason: HOSPADM

## 2022-11-29 RX ORDER — ENOXAPARIN SODIUM 100 MG/ML
40 INJECTION SUBCUTANEOUS
Status: DISCONTINUED | OUTPATIENT
Start: 2022-11-29 | End: 2022-12-03 | Stop reason: HOSPADM

## 2022-11-29 RX ORDER — ATORVASTATIN CALCIUM 40 MG/1
40 TABLET, FILM COATED ORAL
Status: DISCONTINUED | OUTPATIENT
Start: 2022-11-29 | End: 2022-12-03 | Stop reason: HOSPADM

## 2022-11-29 RX ORDER — FOLIC ACID 1 MG/1
1 TABLET ORAL DAILY
Status: DISCONTINUED | OUTPATIENT
Start: 2022-11-30 | End: 2022-12-03 | Stop reason: HOSPADM

## 2022-11-29 RX ORDER — DULOXETIN HYDROCHLORIDE 30 MG/1
30 CAPSULE, DELAYED RELEASE ORAL DAILY
Status: DISCONTINUED | OUTPATIENT
Start: 2022-11-30 | End: 2022-12-03 | Stop reason: HOSPADM

## 2022-11-29 RX ADMIN — ATORVASTATIN CALCIUM 40 MG: 40 TABLET, FILM COATED ORAL at 17:55

## 2022-11-29 RX ADMIN — ACETAMINOPHEN 650 MG: 325 TABLET ORAL at 06:15

## 2022-11-29 RX ADMIN — INSULIN LISPRO 6 UNITS: 100 INJECTION, SOLUTION INTRAVENOUS; SUBCUTANEOUS at 12:36

## 2022-11-29 RX ADMIN — INSULIN LISPRO 6 UNITS: 100 INJECTION, SOLUTION INTRAVENOUS; SUBCUTANEOUS at 08:29

## 2022-11-29 RX ADMIN — AMLODIPINE BESYLATE 5 MG: 5 TABLET ORAL at 17:55

## 2022-11-29 RX ADMIN — DULOXETINE HYDROCHLORIDE 60 MG: 60 CAPSULE, DELAYED RELEASE ORAL at 08:16

## 2022-11-29 RX ADMIN — CYANOCOBALAMIN TAB 500 MCG 500 MCG: 500 TAB at 08:23

## 2022-11-29 RX ADMIN — GABAPENTIN 100 MG: 100 CAPSULE ORAL at 08:16

## 2022-11-29 RX ADMIN — CHLORHEXIDINE GLUCONATE 15 ML: 1.2 SOLUTION ORAL at 22:58

## 2022-11-29 RX ADMIN — MELATONIN 6 MG: at 22:58

## 2022-11-29 RX ADMIN — INSULIN LISPRO 1 UNITS: 100 INJECTION, SOLUTION INTRAVENOUS; SUBCUTANEOUS at 06:25

## 2022-11-29 RX ADMIN — INSULIN LISPRO 6 UNITS: 100 INJECTION, SOLUTION INTRAVENOUS; SUBCUTANEOUS at 17:59

## 2022-11-29 RX ADMIN — INSULIN LISPRO 1 UNITS: 100 INJECTION, SOLUTION INTRAVENOUS; SUBCUTANEOUS at 18:00

## 2022-11-29 RX ADMIN — INSULIN GLARGINE 22 UNITS: 100 INJECTION, SOLUTION SUBCUTANEOUS at 22:59

## 2022-11-29 RX ADMIN — AMLODIPINE BESYLATE 5 MG: 5 TABLET ORAL at 08:15

## 2022-11-29 RX ADMIN — CHLORHEXIDINE GLUCONATE 15 ML: 1.2 SOLUTION ORAL at 08:15

## 2022-11-29 RX ADMIN — OXYCODONE HYDROCHLORIDE 5 MG: 5 SOLUTION ORAL at 00:18

## 2022-11-29 RX ADMIN — HEPARIN SODIUM 5000 UNITS: 5000 INJECTION INTRAVENOUS; SUBCUTANEOUS at 06:15

## 2022-11-29 RX ADMIN — ACETAMINOPHEN 650 MG: 325 TABLET ORAL at 00:18

## 2022-11-29 RX ADMIN — PANTOPRAZOLE SODIUM 40 MG: 40 TABLET, DELAYED RELEASE ORAL at 08:16

## 2022-11-29 RX ADMIN — ACETAMINOPHEN 650 MG: 325 TABLET ORAL at 23:02

## 2022-11-29 RX ADMIN — ENOXAPARIN SODIUM 40 MG: 40 INJECTION SUBCUTANEOUS at 08:15

## 2022-11-29 RX ADMIN — OXYCODONE HYDROCHLORIDE 5 MG: 5 SOLUTION ORAL at 12:36

## 2022-11-29 RX ADMIN — ACETAMINOPHEN 650 MG: 325 TABLET ORAL at 12:36

## 2022-11-29 RX ADMIN — OXYCODONE HYDROCHLORIDE 5 MG: 5 SOLUTION ORAL at 06:15

## 2022-11-29 RX ADMIN — FOLIC ACID 1 MG: 1 TABLET ORAL at 08:16

## 2022-11-29 RX ADMIN — QUETIAPINE FUMARATE 25 MG: 25 TABLET ORAL at 22:59

## 2022-11-29 RX ADMIN — ACETAMINOPHEN 650 MG: 325 TABLET ORAL at 17:58

## 2022-11-29 NOTE — PLAN OF CARE
Problem: MOBILITY - ADULT  Goal: Maintain or return to baseline ADL function  Description: INTERVENTIONS:  -  Assess patient's ability to carry out ADLs; assess patient's baseline for ADL function and identify physical deficits which impact ability to perform ADLs (bathing, care of mouth/teeth, toileting, grooming, dressing, etc )  - Assess/evaluate cause of self-care deficits   - Assess range of motion  - Assess patient's mobility; develop plan if impaired  - Assess patient's need for assistive devices and provide as appropriate  - Encourage maximum independence but intervene and supervise when necessary  - Involve family in performance of ADLs  - Assess for home care needs following discharge   - Consider OT consult to assist with ADL evaluation and planning for discharge  - Provide patient education as appropriate  11/29/2022 1052 by Brigitte Lan  Outcome: Progressing  11/29/2022 1051 by Brigitte Lan  Outcome: Progressing     Problem: Prexisting or High Potential for Compromised Skin Integrity  Goal: Skin integrity is maintained or improved  Description: INTERVENTIONS:  - Identify patients at risk for skin breakdown  - Assess and monitor skin integrity  - Assess and monitor nutrition and hydration status  - Monitor labs   - Assess for incontinence   - Turn and reposition patient  - Assist with mobility/ambulation  - Relieve pressure over bony prominences  - Avoid friction and shearing  - Provide appropriate hygiene as needed including keeping skin clean and dry  - Evaluate need for skin moisturizer/barrier cream  - Collaborate with interdisciplinary team   - Patient/family teaching  - Consider wound care consult   Outcome: Progressing     Problem: SAFETY ADULT  Goal: Maintain or return to baseline ADL function  Description: INTERVENTIONS:  -  Assess patient's ability to carry out ADLs; assess patient's baseline for ADL function and identify physical deficits which impact ability to perform ADLs (bathing, care of mouth/teeth, toileting, grooming, dressing, etc )  - Assess/evaluate cause of self-care deficits   - Assess range of motion  - Assess patient's mobility; develop plan if impaired  - Assess patient's need for assistive devices and provide as appropriate  - Encourage maximum independence but intervene and supervise when necessary  - Involve family in performance of ADLs  - Assess for home care needs following discharge   - Consider OT consult to assist with ADL evaluation and planning for discharge  - Provide patient education as appropriate  11/29/2022 1052 by Kolby Mcdonald  Outcome: Progressing  11/29/2022 1051 by Kolby Mcdonald  Outcome: Progressing     Problem: Nutrition/Hydration-ADULT  Goal: Nutrient/Hydration intake appropriate for improving, restoring or maintaining nutritional needs  Description: Monitor and assess patient's nutrition/hydration status for malnutrition  Collaborate with interdisciplinary team and initiate plan and interventions as ordered  Monitor patient's weight and dietary intake as ordered or per policy  Utilize nutrition screening tool and intervene as necessary  Determine patient's food preferences and provide high-protein, high-caloric foods as appropriate       INTERVENTIONS:  - Monitor oral intake, urinary output, labs, and treatment plans  - Assess nutrition and hydration status and recommend course of action  - Evaluate amount of meals eaten  - Assist patient with eating if necessary   - Allow adequate time for meals  - Recommend/ encourage appropriate diets, oral nutritional supplements, and vitamin/mineral supplements  - Order, calculate, and assess calorie counts as needed  - Recommend, monitor, and adjust tube feedings and TPN/PPN based on assessed needs  - Assess need for intravenous fluids  - Provide specific nutrition/hydration education as appropriate  - Include patient/family/caregiver in decisions related to nutrition  Outcome: Progressing

## 2022-11-29 NOTE — PROGRESS NOTES
Progress Note - Surgical Oncology  Sushant Tenorio 61 y o  male MRN: [id number]  Unit/Bed#: [facility] 205-01 Encounter: [id number]      Assessment:  62yo M w/ metastatic colon cancer s/p transverse colectomy and partial hepatectomy on 14/0/50 complicated by an anastomotic leak requiring right hemicolectomy and loop ileostomy  Afebrile, vitals WNL on room air  UOP: 1,100cc/24hrs  L 19Fr VICTORINO: 5cc/24hrs  Wound vac: 50cc/24hrs  Ileostomy: 350cc/24hrs    Plan:  - Regular diet as tolerated + nutritional supplementation  - Aggressive pulmonary toilet  - Endocrine consult appreciated, recommended bridging to Lantus 22u at bedtime last evening and starting Lispro 6u TID today plus SSI  - Infectious disease consulted and following, recommended no further antibiotics for previous ESBL E  Coli bacteremia  - OOB/ambulation  - Vac changes M/W/F  - Social work follow-up regarding rehabilitation facilities    Subjective/Objective     Subjective:   No acute events overnight  This morning the patient reported minimal abdominal pain w/ no fevers, chills, nausea, emesis, dyspnea, or chest pain  He has been tolerating his diet without issue  Objective:     Blood pressure (!) 181/84, pulse 94, temperature 98 4 °F (36 9 °C), temperature source Oral, resp  rate 22, height 5' 8" (1 727 m), weight 114 kg (251 lb 8 7 oz), SpO2 96 %  ,Body mass index is 38 25 kg/m²  Gen: Awake, alert, and in no acute distress  Head: Normocephalic, atraumatic  Neck: No obvious JVD, trachea midline  Cardiovascular: Regular rate  Respiratory:  In no acute respiratory distress w/ no use of accessory muscles of respiration  Abd: Soft, minimally distended, and appropriately tender to palpation surrounding midline laparotomy incision with no signs of infection; ileostomy pink, patent, and productive of nonbloody stool; midline wound vac in place w/ good seal/suction, VICTORINO drain in place w/ SS fluid in collection  Extremities: No visible wounds/ulcerations to the B/L upper/lower extremities  Skin: Warm/dry  Psychiatric: Appropriate mood/affect    Intake/Output Summary (Last 24 hours) at 11/28/2022 2340  Last data filed at 11/28/2022 1801  Gross per 24 hour   Intake 240 ml   Output 2275 ml   Net -2035 ml       Invasive Devices     Central Venous Catheter Line  Duration           Port A Cath 03/10/22 Right Chest 263 days          Peripheral Intravenous Line  Duration           Peripheral IV 11/27/22 Left;Proximal;Ventral (anterior) Forearm 1 day    Peripheral IV 11/28/22 Right;Ventral (anterior) Forearm <1 day          Drain  Duration           Closed/Suction Drain Left Abdomen Bulb 19 Fr  12 days    Ileostomy LUQ 11 days                I have personally reviewed pertinent lab results    , CBC:   Lab Results   Component Value Date    WBC 11 89 (H) 11/28/2022    HGB 8 0 (L) 11/28/2022    HCT 25 2 (L) 11/28/2022    MCV 92 11/28/2022     (H) 11/28/2022    MCH 29 1 11/28/2022    MCHC 31 7 11/28/2022    RDW 15 9 (H) 11/28/2022    MPV 10 7 11/28/2022    NRBC 0 11/28/2022   , CMP:   Lab Results   Component Value Date    SODIUM 145 11/28/2022    K 3 9 11/28/2022     (H) 11/28/2022    CO2 22 11/28/2022    BUN 26 (H) 11/28/2022    CREATININE 1 27 11/28/2022    CALCIUM 8 3 11/28/2022    EGFR 61 11/28/2022   , Coagulation: No results found for: PT, INR, APTT, Urinalysis: No results found for: COLORU, CLARITYU, SPECGRAV, PHUR, LEUKOCYTESUR, NITRITE, PROTEINUA, GLUCOSEU, KETONESU, BILIRUBINUR, BLOODU, Amylase: No results found for: AMYLASE, Lipase: No results found for: LIPASE

## 2022-11-29 NOTE — PROCEDURES
Acute Care Surgery  Bedside V A C  Procedure Note      Location of wound: Midline abdominal wound    Dressings and Foam removed:  1  Dressings  1 Black Foam  1 Bridge    Dimensions of wound: 25 cm x 1 cm x 1 cm    Description of wound: On inspection of the wound today, the wound appeared predominately healthy, there were areas of slough noted on the superior wound that was gently removed    The wound extends down to subcutaneous tissue  Approximately 80% of the wound base is now pink and healthy and there is granulation tissue  The periwound skin remains clean and intact and unremarkable  VAC dressing application:  The periwound skin was cleaned and dried  1  dressings, 1 black foam were cut to size of the wound and placed into the wound  The dressings were then covered with VAC drape  Additional VAC drape and black foam was used to create a bridge to the patient's right abdomen and a base for the track pad  The track pad was then placed over the base of black foam  The VAC was then set to 125 mmHg low Continuous suction  The patient tolerated the procedure well and there were no complications  The patient did not require any excisional debridement during today's dressing change  VAC settings:  125 mmHg  Continuous      Additional Notes: [facility] sticker placed over the dressing per protocol  The next [facility] dressing change will be planned for 11/30  This dressing change took greater than 15 minutes to complete      Raheem Espinosa MD  11/29/2022 11:08 AM

## 2022-11-29 NOTE — PROGRESS NOTES
Progress note - Palliative and Supportive Care   Lillie Kaur 61 y o  male [id number]    Patient Active Problem List   Diagnosis   • Type 2 diabetes mellitus without complication, with long-term current use of insulin (HCC)   • Benign essential hypertension   • Mixed hyperlipidemia   • Erectile dysfunction   • Low testosterone   • Obesity (BMI 30-39  9)   • Testicular hypogonadism   • Incarcerated umbilical hernia   • Left ureteral calculus   • Type 2 diabetes mellitus with hyperlipidemia (HCC)   • Colonic mass   • Microcytic anemia   • Hypokalemia   • Transaminitis   • Thrombocytosis   • Iron deficiency anemia, unspecified   • Malignant neoplasm of transverse colon (HCC)   • 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 liver [facility])   • Colon cancer metastasized to liver [facility])   • Colostomy prolapse (HCC)   • Other fatigue   • Cervical radiculopathy   • Encephalopathy   • Flash pulmonary edema (HCC)   • MR (mitral regurgitation)   • Bacteremia   • ESBL (extended spectrum beta-lactamase) producing bacteria infection   • Acute respiratory failure with hypoxia ([address] )     Active issues specifically addressed today include:   • Metastatic colon cancer status post transverse colectomy on 11/07, followed by right hemicolectomy on 11/16 and loop ileostomy on 11/17   • Cancer associated pain   • Nausea -resolved  • Insomnia-improved  • Palliative care patient  • Delirium    Plan:  1   Symptom management   #Cancer associated pain  • Acetaminophen 650 mg p o  q 6h scheduled  • Duloxetine 60 mg daily  • Gabapentin 100 mg b i d   • Oxy IR 5 mg p o  q 6h scheduled  • Oxy IR 5-10 mg q 4 hours p r n  moderate-to-severe pain  • Dilaudid 0 3 mg IV q 3 hours p r n  breakthrough pain  • 11/28/22 OME 36  • Required one PRN IV dilaudid  #Nausea  • Zofran 4 mg IV q 4 hours p r n    #Insomnia  • Melatonin 6 mg HS   • Seroquel 25 mg HS    #delirium  -Noted to have visual hallucinations x2 days, present during my assessment  - Recommend global delirium precautions including:   - Establishment of day/night cycle via lights during the day and blinds open  Please limit interruptions at night as medically appropriate  - Provide glasses/hearing aids as apprioriate  - Minimize deliriogenic meds as able  - Provide reorientation including date on board and visible clock  - Avoid restraints as able, frequent verbal reorientations or patient care sitter as appropriate  -Geriatrics consult pending per primary team     2  Goals - Ongoing medical optimization aimed towards efforts of functional recovery as able, without limits at this time  3  Psychosocial support   • Patient is supported by his spouse of 20 years, Vika, along with their 2 children Mark Anthony Patel in [facility]  • [address] following, patient currently declining pastoral care services at this time  • 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        Code Status: FULL - Level 1   Decisional apparatus:  Patient is competent on my exam today  If competence is lost, patient's substitute decision maker would default to spouse by [place] 169  Advance Directive / Living Will / POLST:  None documented  Interval history:  No acute events overnight  Patient resting in bed comfortably with Vika at bedside during my visit this morning  Patient with progressive visual hallucinations x2 days per wife Johnnie Durán  During our conversation, patient reports seeing animals around the room, a mime face  He says they are not distressing to him, he asked if they are also going to be on the 9th floor (where he is getting transferred to)  He reports low back pain, described as muscle spasm  Patient's wife reports his appetite has been poor  She is concerned about his ongoing and progressive delirium  She is hopeful that when he moves to the 9th floor and starts working with PT it may improve      MEDICATIONS / ALLERGIES:     all current active meds have been reviewed    Allergies   Allergen Reactions   • Shellfish-Derived Products - Food Allergy Anaphylaxis   • Erythromycin GI Intolerance       OBJECTIVE:    Physical Exam  Physical Exam  Vitals and nursing note reviewed  Constitutional:       General: He is awake  He is not in acute distress  Appearance: He is well-developed  He is ill-appearing  He is not toxic-appearing or diaphoretic  HENT:      Head: Normocephalic and atraumatic  Mouth/Throat:      Pharynx: Oropharynx is clear  Eyes:      Pupils: Pupils are equal, round, and reactive to light  Cardiovascular:      Rate and Rhythm: Normal rate  Pulmonary:      Effort: Pulmonary effort is normal  No respiratory distress  Musculoskeletal:      Cervical back: Neck supple  Right lower leg: No edema  Left lower leg: No edema  Skin:     General: Skin is warm and dry  Neurological:      General: No focal deficit present  Mental Status: He is alert  He is confused  Psychiatric:         Attention and Perception: He perceives visual hallucinations  Mood and Affect: Mood normal          Speech: Speech is rapid and pressured (intermittently)  Behavior: Behavior normal  Behavior is cooperative  Lab Results: I have personally reviewed pertinent labs  Imaging Studies: N/a  EKG, Pathology, and Other Studies: N/a    Counseling / Coordination of Care    Total floor / unit time spent today 30 minutes  Greater than 50% of total time was spent with the patient and / or family counseling and / or coordination of care   A description of the counseling / coordination of care: time spent assessing patient, symptom management,

## 2022-11-29 NOTE — CONSULTS
Consultation - Geriatrics   Tayla Hines 61 y o  male MRN: [id number]  Unit/Bed#: [facility] 205-01 Encounter: [id number]      Assessment/Plan  Encephalopathy  Waxing and waning  Present: alert and oriented x3, periods of hallucinations (pt aware of hallucinations)  Multifactorial:  Infection, pain, altered electrolytes (Na 146 from 153), pain, insomnia, medications  Last recorded BM 11/26/22  Decreased pain medications per palliative  Additionally cannot exclude cymbalta, was initiated 30 mg po daily on 10/27/22 with increased  to 60 mg po daily, pt and wife states the dose was  just increased to full dose prior to admission  cymbalta side effects include: disorientation, confusion, hallucinations, altered sleep    Additionally hydromorphone and gabapentin can potentiate cymbalta    Recommend decreased cymbalta to 30 mg po daily    Maintain delirium precautions:  Provide frequent redirection, reorientation, distraction techniques  Avoid deliriogenic medications such as tramadol, benzodiazepines, anticholinergics,  Benadryl  Treat pain, See geriatric pain protocol  Monitor for constipation and urinary retention  Encourage early and frequent moblization, OOB  Encourage Hydration/ Nutrition  Implement sleep hygiene, limit night time interuptions, group activities    Continue seroquel 25 mg po QHS started on admission  Monitor QTc 494 (11/25/22)  QTc is borderline, continue to monitor, anticipate reduction of Cymbalta may improve QTc      Insomnia  Related to hospitalization, medication, pain  Pt transferred to MS floor, improved sleep environment  First line is behavioral therapy  Avoid sedative hypnotics such as benzodiazepines and benadryl  Encourage staying awake during the day  Encourage daytime activity, morning exercise  Decrease or eliminate day time naps  Avoid caffiene, alcohol especially during late afternoon and evening hours  Establish a night time routine  Continue seroquel at present, continue to monitor    Acute pain due to surgery  Scheduled acetaminophen 650 mg po Q6  Oxycodone 5 mg po Q 4 prn moderate pain  Oxycodone 10 mg po Q 4 prn severe pain   IV dilaudid 0 3 mg prn breakthrough pain  Monitor for constipation  Lidoderm patch  Medications adjusted per pain/ primary team    Cervical radiculopathy left   Prior to arrival taking robaxin and cymbalta  Reduce cymbalta as above  Robaxin held on admission    Sepsis/ ESBL bacteremia  Completed abx  ID on consult    Metastatic colon ca with liver mets  S/p transverse colon resection, vac  Surgery on consult    Home medication review  Spoke with wife and patient to review  cymbalta 60 mg po daily      History of Present Illness   Physician Requesting Consult: Christo Ann MD  Reason for Consult / Principal Problem: delirium  Hx and PE limited by: NA  HPI: Aliza Mei is a 61y o  year old male who presents with multiple hepatic metastasis  He had exploratory lap with transverse colon resection, liver resection, ablation and US of liver with peritoneal biopsy complicated by an anatomic leak requiring right hemicolectomy 11/16 and loop ileostomy 11/17  Post op pt had episode of hypotension, tachycardia, LUQ hematoma requiring blood transfusion  CT head was negative  Neurology was consulted, right sided visual deficit was noted  Cardiology on consult for atrial tachycardia and left anterior fascicular block  Further work up showed gram negative rods in both sets of blood cultures  He was seen by ID, started on IV zosyn  He has DM, CKD3, GERD  Prior to arrival pt lives at home with his wife and 2 children  He works independent with IADLs and ADLs  Upon exam pt is lying in bed, wife at bedside to review HPI  He is oriented x 2-3 (initially states year is 2021)  He is conversational, able to give good history  He continues to have hallucinations, he is aware  Wife at bedside who reports this is the best he has been       Inpatient consult to Gerontology  Consult performed by: GAURANG Moncada  Consult ordered by: Sabine Wheeler PA-C          Review of Systems   Constitutional: Negative for unexpected weight change  HENT: Negative for hearing loss  Eyes: Negative for visual disturbance  Respiratory: Negative for cough  Cardiovascular: Negative for chest pain  Genitourinary: Negative for difficulty urinating  Musculoskeletal: Negative for gait problem  Neurological: Positive for weakness  Psychiatric/Behavioral: Positive for hallucinations and sleep disturbance  Historical Information   Past Medical History:   Diagnosis Date   • Abdominal pain 03/12/2022   • Acute renal failure ([place] [address] )     [id number] resolved   • Cancer ([place] [address] )    • Diabetes mellitus ([place] [address] )    • Enteritis 08/23/2016   • Gastroparesis due to DM ([place] [address] ) 08/23/2016   • GERD (gastroesophageal reflux disease)    • Hernia, ventral 08/04/2016   • Hyperlipidemia    • Hypertension    • Morbid obesity ([place] [address] ) 04/17/2018   • Postoperative visit 03/02/2022   • SIRS (systemic inflammatory response syndrome) ([place] [address] ) 03/12/2022   • Snoring    • Stage 3a chronic kidney disease ([place] [address] ) 02/19/2022     Past Surgical History:   Procedure Laterality Date   • COLONOSCOPY     • COLOSTOMY N/A 02/20/2022    Procedure: COLOSTOMY LOOP, diverting;  Surgeon: Hieu Orr MD;  Location: [facility];  Service: General   • ESOPHAGOGASTRODUODENOSCOPY N/A 08/24/2016    Procedure: ESOPHAGOGASTRODUODENOSCOPY (EGD); Surgeon: Casey Alfonso MD;  Location: [facility];   Service:    • EXPLORATORY LAPAROTOMY W/ BOWEL RESECTION N/A 11/16/2022    Procedure: LAPAROTOMY EXPLORATORY W/ BOWEL RESECTION- VAC PLACEMENT;  Surgeon: Cristiana Azevedo MD;  Location:  MAIN OR;  Service: Surgical Oncology   • EXPLORATORY LAPAROTOMY W/ BOWEL RESECTION N/A 11/17/2022    Procedure: LAPAROTOMY EXPLORATORY W/ BOWEL RESECTION;  Surgeon: Cristiana Azevedo MD;  Location:  MAIN OR;  Service: Surgical Oncology   • ILEOSTOMY N/A 11/17/2022    Procedure: ILEOSTOMY;  Surgeon: Estuardo Rodriguez MD;  [facility]: [facility];  Service: Surgical Oncology   • ILEOSTOMY CLOSURE N/A 11/7/2022    Procedure: REVERSAL COLOSTOMY;  Surgeon: Taylor Cerna MD;  [facility];  Service: Surgical Oncology   • IR PORT PLACEMENT  03/10/2022   • KIDNEY STONE SURGERY     • LIVER BIOPSY LAPAROSCOPIC N/A 02/20/2022    Procedure: DIAGNOSTIC LAPAROSCOPIC LIVER BIOPSY, DIVERTING LOOP COLOSTOMY ;  Surgeon: Gregoria Rowell MD;  [facility];  Service: General   • LIVER LOBECTOMY N/A 11/7/2022    Procedure: SEGMENT 3 LIVER RESECTION, ABLATION, INTRAOPERATIVE U/S OF LIVER, PERITONEAL BIOPSY;  Surgeon: Taylor Cerna MD;  [facility]: [facility];  Service: Surgical Oncology   • RIGHT COLON RESECTION N/A 11/7/2022    Procedure: EX LAP, TRANVERSE COLON RESECTION;  Surgeon: Taylor Cerna MD;  [facility];  Service: Surgical Oncology   • TONSILLECTOMY     • UMBILICAL HERNIA REPAIR LAPAROSCOPIC N/A 04/26/2019    Procedure: LAPAROSCOPIC UMBILICAL HERNIA REPAIR;  Surgeon: Gregoria Rowell MD;  [facility]: [facility];  Service: General   • VAC DRESSING APPLICATION N/A 17/52/7594    Procedure: APPLICATION VAC DRESSING ABDOMEN/TRUNK;  Surgeon: Estuardo Rdoriguez MD;  [facility];  Service: Surgical Oncology     Social History   Social History     Substance and Sexual Activity   Alcohol Use Never     Social History     Substance and Sexual Activity   Drug Use No     Social History     Tobacco Use   Smoking Status Never   Smokeless Tobacco Never         Family History:   Family History   Problem Relation Age of Onset   • Diabetes Mother         mellitus   • Lung cancer Mother    • Coronary artery disease Father        Meds/Allergies   Current meds:   Current Facility-Administered Medications   Medication Dose Route Frequency   • acetaminophen (TYLENOL) tablet 650 mg  650 mg Oral Q6H [facility] & assisted   • amLODIPine (NORVASC) tablet 5 mg  5 mg Oral BID   • atorvastatin (LIPITOR) tablet 40 mg  40 mg Per NG Tube After Dinner   • chlorhexidine (PERIDEX) 0 12 % oral rinse 15 mL  15 mL Mouth/Throat Q12H [facility] HOME   • cyanocobalamin (VITAMIN B-12) tablet 500 mcg  500 mcg Per NG Tube Daily   • DULoxetine (CYMBALTA) delayed release capsule 60 mg  60 mg Oral Daily   • enoxaparin (LOVENOX) subcutaneous injection 40 mg  40 mg Subcutaneous D54J SHANNON   • folic acid (FOLVITE) tablet 1 mg  1 mg Per NG Tube Daily   • gabapentin (NEURONTIN) capsule 100 mg  100 mg Oral BID   • HYDROmorphone (DILAUDID) injection 0 3 mg  0 3 mg Intravenous Q3H PRN   • insulin glargine (LANTUS) subcutaneous injection 22 Units 0 22 mL  22 Units Subcutaneous HS   • insulin lispro (HumaLOG) 100 units/mL subcutaneous injection 1-5 Units  1-5 Units Subcutaneous TID AC   • insulin lispro (HumaLOG) 100 units/mL subcutaneous injection 6 Units  6 Units Subcutaneous TID With Meals   • melatonin tablet 6 mg  6 mg Oral HS   • naloxone (NARCAN) 0 04 mg/mL syringe 0 04 mg  0 04 mg Intravenous Q1MIN PRN   • ondansetron (ZOFRAN) injection 4 mg  4 mg Intravenous Q4H PRN   • oxyCODONE (ROXICODONE) oral solution 5 mg  5 mg Oral Q4H PRN    Or   • oxyCODONE (ROXICODONE) oral solution 10 mg  10 mg Oral Q4H PRN   • oxyCODONE (ROXICODONE) oral solution 5 mg  5 mg Oral Q6H   • pantoprazole (PROTONIX) EC tablet 40 mg  40 mg Oral Early Morning   • QUEtiapine (SEROquel) tablet 25 mg  25 mg Oral HS          Allergies   Allergen Reactions   • Shellfish-Derived Products - Food Allergy Anaphylaxis   • Erythromycin GI Intolerance       Objective   Vitals: Blood pressure 144/74, pulse 80, temperature 98 2 °F (36 8 °C), temperature source Oral, resp  rate 22, height 5' 8" (1 727 m), weight 114 kg (251 lb 8 7 oz), SpO2 95 %  ,Body mass index is 38 25 kg/m²  Physical Exam  Vitals and nursing note reviewed  HENT:      Head: Normocephalic  Nose: No congestion        Mouth/Throat:      Mouth: Mucous membranes are moist    Eyes:      General: Right eye: No discharge  Left eye: No discharge  Cardiovascular:      Rate and Rhythm: Normal rate  Rhythm irregular  Pulmonary:      Effort: Pulmonary effort is normal    Abdominal:      General: Bowel sounds are normal       Palpations: Abdomen is soft  Musculoskeletal:         General: Normal range of motion  Cervical back: Normal range of motion  Skin:     General: Skin is warm and dry  Neurological:      Mental Status: He is alert and oriented to person, place, and time  Psychiatric:         Mood and Affect: Mood normal       Comments: hallucinations         Lab Results:   Results from last 7 days   Lab Units 11/29/22  0643   WBC Thousand/uL 10 57*   HEMOGLOBIN g/dL 8 1*   HEMATOCRIT % 27 0*   PLATELETS Thousands/uL 415*        Results from last 7 days   Lab Units 11/29/22  0643 11/26/22  0512 11/25/22  0459   POTASSIUM mmol/L 3 9   < > 4 0   CHLORIDE mmol/L 119*   < > 127*   CO2 mmol/L 22   < > 22   BUN mg/dL 20   < > 30*   CREATININE mg/dL 1 13   < > 1 37*   CALCIUM mg/dL 8 1*   < > 7 9*   ALK PHOS U/L  --   --  436*   ALT U/L  --   --  19   AST U/L  --   --  67*    < > = values in this interval not displayed  Imaging Studies: I have personally reviewed pertinent reports  EKG, Pathology, and Other Studies: I have personally reviewed pertinent reports      VTE Prophylaxis: Sequential compression device (Venodyne)     Code Status: Level 1 - Full Code

## 2022-11-29 NOTE — SPEECH THERAPY NOTE
Speech/Language Pathology Progress Note    Patient Name: Moiz Steinberg  XCMKA'L Date: 11/29/2022    Subjective:  Pt awake and alert, fully oriented  Objective:  Pt seen for dysphagia therapy  He was offered a trial of pretzels to assess tolerance of regular solids  Mastication was complete and timely  Oral clearance was complete  No s/s aspiration noted with regular solids or thin liquids  Pt reported improved appetite and desire for regular foods  Assessment:  Okay to advance diet to regular  Plan/Recommendations:  Advance diet to regular with thin liquids  D/c ST services

## 2022-11-29 NOTE — CASE MANAGEMENT
Case Management Discharge Planning Note    Patient name Tayla Hines  [facility] MRN [id number]  : 1965 Date 2022       Current Admission Date: 2022  Current Admission Diagnosis:Colon cancer metastasized to liver [facility])   Patient Active Problem List    Diagnosis Date Noted   • Flash pulmonary edema ([address] ) 2022   • MR (mitral regurgitation) 2022   • Bacteremia 2022   • ESBL (extended spectrum beta-lactamase) producing bacteria infection 2022   • Acute respiratory failure with hypoxia ([facility] ) 2022   • Encephalopathy 2022   • Cervical radiculopathy 10/26/2022   • Other fatigue 06/15/2022   • Colostomy prolapse ([facility] ) 2022   • Colon cancer metastasized to liver ([facility] ) 2022   • 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 liver ([facility] ) 2022   • Iron deficiency anemia, unspecified 2022   • Malignant neoplasm of transverse colon ([facility] ) 2022   • Thrombocytosis 2022   • Hypokalemia 2022   • Transaminitis 2022   • Type 2 diabetes mellitus with hyperlipidemia ([facility] ) 2022   • Colonic mass 2022   • Microcytic anemia 2022   • Left ureteral calculus 2020   • Incarcerated umbilical hernia    • Testicular hypogonadism 2017   • Low testosterone 2017   • Type 2 diabetes mellitus without complication, with long-term current use of insulin ([facility] ) 2016   • Benign essential hypertension 2016   • Mixed hyperlipidemia 2016   • Erectile dysfunction 2016   • Obesity (BMI 30-39 9) 2016      LOS (days): 22  Geometric Mean LOS (GMLOS) (days): 9 80  Days to GMLOS:-12 3     OBJECTIVE:  Risk of Unplanned Readmission Score: 36 74         Current admission status: Inpatient   Preferred Pharmacy:   [facility] [id number] - [address] [place] [address] Drive  [address] [place] [address]  Phone: [phone] Fax: 411 [address]  [address]  [facility] 3  [address] [place] [id number]  Phone: [phone] Fax: [phone]    [id number] [place] - [address] [facility] [address]  Phone: [phone] Fax: [phone]    Primary Care Provider: James Youngblood MD    Primary Insurance: [employer]  Secondary Insurance:     DISCHARGE DETAILS:                       Additional Comments: As per conversation with provider, patient should be ready for d/c within 24-48 hours  Wound vac likely to be removed tomorrow  Additional STR referrals placed, as no accepting facilities within 10 mile radius of patient's home  Assigned CM will continue to follow

## 2022-11-29 NOTE — PROGRESS NOTES
Progress Note - Infectious Disease   Bevely Guardian 61 y o  male MRN: [id number]  Unit/Bed#: [facility] 205-01 Encounter: [id number]      Impression/Recommendations:  1  Sepsis, secondary to intra-abdominal infection and bacteremia   Patient is clinically much improved on antibiotic and status post I&D/bowel resection   WBC is much decreased, near normal   Temperature is down   Patient completed antibiotic course  Observe off antibiotic  Monitor temperature/WBC  Monitor hemodynamics      2  ESBL producing E coli bacteremia, likely secondary to gut translocation during recent colon surgery   Patient is clinically improved   Bacteremia cleared   Patient completed antibiotic course  No further antibiotic needed for this      3  Anastomotic leak up with intra-abdominal abscess/infected hematoma   Patient is status post exploratory laparotomy, with drainage of abscess and cecum resection   Operative culture growing ESBL producing E coli again, with lactobacillus   Patient had repeat ex lap, with healthy-appearing bowel   Repeat CT with resolved intra-abdominal collections   Patient completed antibiotic course  Observe off for antibiotic  Wound care/VAC per surgery      4  Metastatic colon cancer, with liver metastases   Patient is status post transverse colon resection  Surgical oncology follow-up      5  ALEXY, superimposed on CKD   Creatinine worsen postop but improving again  Monitor creatinine       6  Acute hypoxic respiratory failure, previously on ventilator, but now extubated   No clinical pneumonia  Monitor respiratory status      7  Encephalopathy, multifactorial   Mental status slowly improving, near normal now  Monitor mental status      8  Type 2 DM      Discussed with patient in detail regarding the above plan      Antibiotics:  Off antibiotic     Subjective:  Patient is comfortable, awake and alert  Minimal abdominal pain  Mental status is quite good  Temperature stays down   No chills    He is tolerating antibiotic well   No nausea, vomiting or diarrhea      Objective:  Vitals:  Temp:  [97 4 °F (36 3 °C)-98 5 °F (36 9 °C)] 98 4 °F (36 9 °C)  HR:  [92-94] 92  BP: (138-181)/(79-84) 138/79  SpO2:  [94 %-96 %] 94 %  Temp (24hrs), Av 1 °F (36 7 °C), Min:97 4 °F (36 3 °C), Max:98 5 °F (36 9 °C)  Current: Temperature: 98 4 °F (36 9 °C)    Physical Exam:     General: Awake, alert, cooperative, no distress  Neck:  Supple  No mass  No lymphadenopathy  Lungs: Expansion symmetric, no rales, no wheezing, respirations unlabored  Heart:  Regular rate and rhythm, S1 and S2 normal, no murmur  Abdomen: Soft, nondistended, wound with VAC, without purulence/erythema, mild tenderness  Extremities: Minimal edema  No erythema/warmth  No ulcer  Nontender to palpation  Skin:  No rash  Neuro: Moves all extremities  Invasive Devices     Central Venous Catheter Line  Duration           Port A Cath 03/10/22 Right Chest 263 days          Peripheral Intravenous Line  Duration           Peripheral IV 22 Left;Proximal;Ventral (anterior) Forearm 1 day    Peripheral IV 22 Right;Ventral (anterior) Forearm 1 day          Drain  Duration           Closed/Suction Drain Left Abdomen Bulb 19 Fr  12 days    Ileostomy LUQ 11 days                Labs studies:   I have personally reviewed pertinent labs    Results from last 7 days   Lab Units 22  0643 22  0440 22  0506 22  0512 22  0459 22  1436 22  0517   POTASSIUM mmol/L 3 9 3 9 3 6   < > 4 0   < > 4 0   CHLORIDE mmol/L 119* 119* 123*   < > 127*   < > 124*   CO2 mmol/L 22 22 22   < > 22   < > 25   BUN mg/dL 20 26* 24   < > 30*   < > 36*   CREATININE mg/dL 1 13 1 27 1 34*   < > 1 37*   < > 1 42*   EGFR ml/min/1 73sq m 71 61 57   < > 56   < > 54   CALCIUM mg/dL 8 1* 8 3 8 1*   < > 7 9*   < > 7 8*   AST U/L  --   --   --   --  67*  --  68*   ALT U/L  --   --   --   --  19  --  19   ALK PHOS U/L  --   --   --   --  436*  -- 467*    < > = values in this interval not displayed  Results from last 7 days   Lab Units 11/29/22  0643 11/28/22  0440 11/27/22  0618 11/27/22  0506   WBC Thousand/uL 10 57* 11 89*  --  12 84*   HEMOGLOBIN g/dL 8 1* 8 0* 6 9* 6 8*   PLATELETS Thousands/uL 415* 433*  --  448*           Imaging Studies:   I have personally reviewed pertinent imaging study reports and images in PACS  EKG, Pathology, and Other Studies:   I have personally reviewed pertinent reports

## 2022-11-29 NOTE — WOUND OSTOMY CARE
Consult Note - OSTOMY   Tamera Rizzo 61 y o  male MRN: [id number]  Unit/Bed#: [facility] 901-01 Encounter: [id number]      Assessment:   Patient is a 62 yo male admitted to [facility] for treatment of colon cancer with mets to the liver  Per the spouse, present at bedside during [facility] RN assessment, patient has periods of confusion and clarity  She states that he has not been himself completlely  Per the PT, patient has been confused since the rapid response with subsequent intubation  Status post takedown of colostomy and partial hepatectomy followed by right hemicolectomy, loop ileostomy  Midline incision managed via wound vac  Both patient and spouse stated competency/ ability to perform ostomy appliance change independently  Asked questions regarding difference between ileostomy and colostomy- patient and spouse were educated on difference and need to proper hydration  Stated understanding of teaching  Spouse and patient stated that at this time, that they should be able to care for ileostomy independently  Declined ostomy teaching at this time  They were made aware that they could contact [facility] RN at any time for assistance or questions  Stated understanding  [facility] RN Team to sign off at this time  Re-consult wound and ostomy team with questions comments or concerns         Georgia Roy RN, BSN

## 2022-11-30 LAB
GLUCOSE SERPL-MCNC: 132 MG/DL (ref 65–140)
GLUCOSE SERPL-MCNC: 133 MG/DL (ref 65–140)
GLUCOSE SERPL-MCNC: 167 MG/DL (ref 65–140)

## 2022-11-30 RX ADMIN — AMLODIPINE BESYLATE 5 MG: 5 TABLET ORAL at 19:34

## 2022-11-30 RX ADMIN — MELATONIN 6 MG: at 22:25

## 2022-11-30 RX ADMIN — PANTOPRAZOLE SODIUM 40 MG: 40 TABLET, DELAYED RELEASE ORAL at 05:10

## 2022-11-30 RX ADMIN — HYDROMORPHONE HYDROCHLORIDE 0.3 MG: 1 INJECTION, SOLUTION INTRAMUSCULAR; INTRAVENOUS; SUBCUTANEOUS at 10:51

## 2022-11-30 RX ADMIN — CHLORHEXIDINE GLUCONATE 15 ML: 1.2 SOLUTION ORAL at 11:45

## 2022-11-30 RX ADMIN — DULOXETINE HYDROCHLORIDE 30 MG: 30 CAPSULE, DELAYED RELEASE ORAL at 11:45

## 2022-11-30 RX ADMIN — OXYCODONE HYDROCHLORIDE 5 MG: 5 SOLUTION ORAL at 05:11

## 2022-11-30 RX ADMIN — CHLORHEXIDINE GLUCONATE 15 ML: 1.2 SOLUTION ORAL at 22:25

## 2022-11-30 RX ADMIN — ACETAMINOPHEN 650 MG: 325 TABLET ORAL at 11:45

## 2022-11-30 RX ADMIN — CYANOCOBALAMIN TAB 500 MCG 500 MCG: 500 TAB at 11:45

## 2022-11-30 RX ADMIN — OXYCODONE HYDROCHLORIDE 5 MG: 5 SOLUTION ORAL at 22:26

## 2022-11-30 RX ADMIN — ATORVASTATIN CALCIUM 40 MG: 40 TABLET, FILM COATED ORAL at 19:34

## 2022-11-30 RX ADMIN — QUETIAPINE FUMARATE 25 MG: 25 TABLET ORAL at 22:25

## 2022-11-30 RX ADMIN — ACETAMINOPHEN 650 MG: 325 TABLET ORAL at 05:11

## 2022-11-30 RX ADMIN — INSULIN GLARGINE 22 UNITS: 100 INJECTION, SOLUTION SUBCUTANEOUS at 22:25

## 2022-11-30 RX ADMIN — FOLIC ACID 1 MG: 1 TABLET ORAL at 11:45

## 2022-11-30 RX ADMIN — ACETAMINOPHEN 650 MG: 325 TABLET ORAL at 19:34

## 2022-11-30 RX ADMIN — ENOXAPARIN SODIUM 40 MG: 40 INJECTION SUBCUTANEOUS at 11:45

## 2022-11-30 RX ADMIN — AMLODIPINE BESYLATE 5 MG: 5 TABLET ORAL at 11:45

## 2022-11-30 NOTE — OCCUPATIONAL THERAPY NOTE
Occupational Therapy Progress Note     Patient Name: Satish Qiu  OQXMS'S Date: 11/30/2022  Problem List  Principal Problem:    Colon cancer metastasized to liver [facility])  Active Problems:    Benign essential hypertension    Type 2 diabetes mellitus with hyperlipidemia (HCC)    Malignant neoplasm of transverse colon (HCC)    Encephalopathy    Flash pulmonary edema (HCC)    MR (mitral regurgitation)    Bacteremia    ESBL (extended spectrum beta-lactamase) producing bacteria infection    Acute respiratory failure with hypoxia ([address] )          11/30/22 0852   OT Last Visit   OT Visit Date 11/30/22   Note Type   Note Type Treatment   Pain Assessment   Pain Assessment Tool 0-10   Pain Score 7   Pain Location/Orientation Location: Abdomen   Hospital Pain Intervention(s) Repositioned; Ambulation/increased activity   Restrictions/Precautions   Other Precautions Cognitive; Bed Alarm; Chair Alarm;Multiple lines;Pain; Fall Risk  (VICTORINO, wound VAC, ostomy)   Lifestyle   Autonomy PTA, pt was I with ADLs, IADLs, and fnxl mobility   Reciprocal Relationships supportive family - wife and 2 kids (dtr is 15, son is 16)   Service to Others disability   Intrinsic Gratification Played "[employer] in [facility] [address] for past 20 yrs   ADL   Where Assessed Edge of bed   Grooming Assistance 4  Minimal Assistance   Grooming Deficit Brushing hair   Grooming Comments + washing hair with shampoo cap   LB Dressing Assistance 2  Maximal Assistance   LB Dressing Deficit Don/doff R sock; Don/doff L sock   Bed Mobility   Supine to Sit 2  Maximal assistance   Additional items Assist x 2;HOB elevated; Bedrails; Increased time required;LE management   Sit to Supine Unable to assess   Transfers   Sit to Stand 2  Maximal assistance   Additional items Assist x 2; Increased time required;Verbal cues   Stand to Sit 2  Maximal assistance   Additional items Assist x 2; Increased time required;Verbal cues   Additional Comments transfers with B/L HHA   Functional Mobility   Functional Mobility 2  Maximal assistance   Additional Comments Ax2 bed to chair   Additional items Hand hold assistance   Subjective   Subjective "I'm getting up into the chair today"   Cognition   Overall Cognitive Status Impaired   Arousal/Participation Responsive; Cooperative   Attention Attends with cues to redirect   Orientation Level Oriented to person;Oriented to place;Oriented to time   Following Commands Follows one step commands with increased time or repetition   Comments Pt pleasant and cooperative t/o session  Appropriate t/o session  Pt does express some fear of falling but very motivated for session   Activity Tolerance   Activity Tolerance Patient limited by fatigue;Patient limited by pain   Medical Staff Made Aware PT London Simmons,  [facility]   Assessment   Assessment Patient participated in Skilled OT session this date with interventions consisting of ADL re training with the use of correct body mechnaics, Energy Conservation techniques, deep breathing technique, safety awareness and fall prevention techniques,  therapeutic activities to: increase activity tolerance, increase dynamic sit/ stand balance during functional activity  and increase OOB/ sitting tolerance   Upon arrival patient was found supine in bed  Pt demonstrated the following tasks: MAX A x 2 sup to sit, STS, and fnxl mobility with RW  Pt requires MIN A for hair care and MAX A for LBD  Pt very motivated to improve and very participatory in session  Patient continues to be functioning below baseline level, occupational performance remains limited secondary to factors listed above and increased risk for falls and injury  From OT standpoint, recommendation at time of d/c would be STR  Patient to benefit from continued Occupational Therapy treatment while in the hospital to address deficits as defined above and maximize level of functional independence with ADLs and functional mobility   Pt was left in chair with alarm on after session with all current needs met  The patient's raw score on the AM-PAC Daily Activity inpatient short form is 14, standardized score is 33 39, less than 39 4  Patients at this level are likely to benefit from discharge to post-acute rehabilitation services  Please refer to the recommendation of the Occupational Therapist for safe discharge planning  Plan   Treatment Interventions ADL retraining;Functional transfer training; Endurance training;UE strengthening/ROM; Cognitive reorientation;Patient/family training;Equipment evaluation/education; Fine motor coordination activities; Compensatory technique education;Continued evaluation; Energy conservation; Activityengagement   Goal Expiration Date 12/07/22   OT Treatment Day 4   OT Frequency   (2-4x/wk)   Recommendation   OT Discharge Recommendation Post acute rehabilitation services   [facility] Daily Activity Inpatient   Lower Body Dressing 2   Bathing 2   Toileting 2   Upper Body Dressing 2   Grooming 3   Eating 3   Daily Activity Raw Score 14   Daily Activity Standardized Score (Calc for Raw Score >=11) 33 39   AM-PAC Applied Cognition Inpatient   Following a Speech/Presentation 3   Understanding Ordinary Conversation 4   Taking Medications 3   Remembering Where Things Are Placed or Put Away 3   Remembering List of 4-5 Errands 2   Taking Care of Complicated Tasks 2   Applied Cognition Raw Score 17   Applied Cognition Standardized Score 36 52     Koren Sacks, MS, OTR/L

## 2022-11-30 NOTE — PROGRESS NOTES
Progress Note - Surgical Oncology  Tayla Hines 61 y o  male MRN: [id number]  [facility] Encounter: [id number]      Objective:  Anxious this morning  Complaining of generalized abdominal pain, left greater than right  Patient states he ate more for dinner last night after having a conversation with his wife that he needs to eat  No nausea no vomiting  Patient does not like the mechanical soft diet  Patient is using the urinal in bed to urinate  He has not ambulated  Patient is on Lovenox 40 mg daily to prevent DVTs  Patient's ileostomy is functioning now  There has been no drainage from the [facility]  His David-Austin is minimal drainage  Patient underwent a CT of his head yesterday to rule out any kind of metastatic disease  Wife is extremely concerned about his mental status  Patient is having wound care for ileostomy care and teaching  Urine tolerate a soft patient yesterday and recommended we decrease his Cymbalta dose as well as DC the gabapentin which could be interfering with his mental status  There were no available beds at the skilled nursing facilities within 10 miles of his home and therefore case management has sent out a subsequent referrals  400 UA (days not recorded)  5 VICTORINO  0 VAC  500 ileostomy    Blood pressure (!) 175/85, pulse 90, temperature 97 7 °F (36 5 °C), resp  rate 18, height 5' 8" (1 727 m), weight 114 kg (251 lb 8 7 oz), SpO2 91 %  ,Body mass index is 38 25 kg/m²        Intake/Output Summary (Last 24 hours) at 11/30/2022 0518  Last data filed at 11/30/2022 0301  Gross per 24 hour   Intake 200 ml   Output 1670 ml   Net -1470 ml       Invasive Devices     Central Venous Catheter Line  Duration           Port A Cath 03/10/22 Right Chest 264 days          Peripheral Intravenous Line  Duration           Peripheral IV 11/27/22 Left;Proximal;Ventral (anterior) Forearm 2 days    Peripheral IV 11/28/22 Right;Ventral (anterior) Forearm 2 days          Drain  Duration Closed/Suction Drain Left Abdomen Bulb 19 Fr  13 days    Ileostomy LUQ 12 days                Physical Exam:   Patient is alert and orientated x3 this morning, appears anxious  Abdomen:  Obese, soft, David-Austin drain is serous in nature, ileostomy bag has stool as well as air, midline incision has a VAC that is intact, right abdominal horizontal incision is clean and dry, staples intact  Extremities:  He has no calf tenderness bilaterally, there is no pedal edema bilaterally there is no upper extremity edema    Lab, Imaging and other studies:  Pending  VTE Pharmacologic Prophylaxis: Enoxaparin (Lovenox)  VTE Mechanical Prophylaxis: sequential compression device    Assessment:  POD # 23 exploratory laparotomy, resection segment 3 of the liver, transverse colectomy, reversal loop colostomy  POD # 23 of extubation  POD # 14 exploratory laparotomy, right hemicolectomy, left discontinuity, ABThera VAC  POD # 13 exploratory laparotomy partial descending colectomy primary anastomosis, diverting loop ileostomy, midline VAC  POD # 8 of extubation    Plan:  1  Patient needs to be out of bed and working with physical therapy and occupational therapy  2 ? Speech re-eval for regular diet and not mechanical soft  3  Continue Lovenox for DVT prophylaxis  4  Continue geriatric recommendations  5  Calorie count  6  Continue to work with incentive spirometry  7  Glucerna t i d   8  Check a m  Labs  9  Continue endocrinology recommendations  10   VAC change today possible bedside closure

## 2022-11-30 NOTE — RESTORATIVE TECHNICIAN NOTE
Restorative Technician Note      Patient Name: Felton Marx     Restorative Tech Visit Date: 11/30/22  Note Type: Mobility  Patient Position Upon Consult: Seated edge of bed (pt seated EOB with residents @ bedside)  Activity Performed: Transferred  Patient Position at End of Consult: Supine; All needs within reach;  Other (comment) (assisted pt back to supine for maximum comfort)      Bo Fitch  Restorative Technician

## 2022-11-30 NOTE — PHYSICAL THERAPY NOTE
PHYSICAL THERAPY NOTE          Patient Name: Judy Cervantes  FSBOP'P Date: 11/30/2022 11/30/22 0851   PT Last Visit   PT Visit Date 11/30/22   Note Type   Note Type Treatment   Pain Assessment   Pain Assessment Tool 0-10   Pain Score 7   Pain Location/Orientation Location: Abdomen   Hospital Pain Intervention(s) Repositioned; Ambulation/increased activity; Emotional support   Restrictions/Precautions   Weight Bearing Precautions Per Order No   Other Precautions Cognitive; Chair Alarm; Bed Alarm;Multiple lines; Fall Risk;Pain  (wound vac, VICTORINO drain, ostomy)   General   Chart Reviewed Yes   Response to Previous Treatment Patient with no complaints from previous session  Family/Caregiver Present No   Cognition   Overall Cognitive Status Impaired   Arousal/Participation Cooperative; Alert   Attention Attends with cues to redirect   Orientation Level Oriented to person;Oriented to place;Oriented to time   Following Commands Follows one step commands with increased time or repetition   Comments pt pleasant and cooperative, increased time required to complete all tasks  Pt does express fear of falling, anxious at times- increased time + emotional support provided   Bed Mobility   Supine to Sit 2  Maximal assistance   Additional items Assist x 2;HOB elevated; Bedrails; Increased time required;Verbal cues;LE management  (semi-log roll utilized to decrease strain on abdomen)   Sit to Supine Unable to assess   Additional Comments pt supine in bed upon arrival  Pt returned to sitting OOB in recliner with all needs wihtin reach- chair alarm on   Transfers   Sit to Stand 2  Maximal assistance   Additional items Assist x 2; Increased time required;Verbal cues   Stand to Sit 2  Maximal assistance   Additional items Assist x 2; Increased time required;Verbal cues   Additional Comments transfers with b/l HHA; 2x STS performed throughout session Ambulation/Elevation   Gait pattern Excessively slow; Short stride; Foward flexed;Decreased foot clearance; Improper Weight shift;Knees flexed   Gait Assistance 2  Maximal assist   Additional items Assist x 2;Verbal cues; Tactile cues   Assistive Device Other (Comment)  (b/l HHA)   Distance steps to chair (~4 steps)   Balance   Static Sitting Fair   Dynamic Sitting Fair -   Static Standing Poor   Dynamic Standing Poor -   Ambulatory Poor -   Endurance Deficit   Endurance Deficit Yes   Activity Tolerance   Activity Tolerance Patient tolerated treatment well   Medical Staff Made Aware OT; co-session completed with OT 2* increased medical complexity and multiple co-morbidities   Nurse Made Aware RN cleared pt to participate in therapy session   Exercises   Quad Sets Supine;Bilateral;10 reps;AROM   Heelslides Supine;Bilateral;10 reps;AAROM   Glute Sets Supine;Bilateral;10 reps;AROM   Hip Abduction Supine;Bilateral;10 reps;AROM   Hip Adduction Supine;Bilateral;10 reps;AROM   Ankle Pumps Supine;Bilateral;10 reps;AROM   Balance training  sitting EOB ~15 min with occasional min A requied to correct posterior LOB while performing ADL tasls with OT   Assessment   Prognosis Fair   Problem List Decreased strength;Decreased range of motion;Decreased endurance; Impaired balance;Decreased mobility; Decreased coordination;Decreased cognition; Impaired judgement;Decreased safety awareness;Pain;Decreased skin integrity   Assessment Pt seen for PT treatment session this date  Therapy session focused on bed mobility, functional transfers, gait training and therapeutic exercise in order to improve overall mobility and independence  Pt requires assist of 2 for all mobility performed this date  Pt with improved cognition, responds appropriately throughout session  Pt requires increased time to complete all tasks as well as frequent rest breaks 2* fatigue  Pt performed 2x STS with b/l HHA, able to take small steps to bedside chair   Pt performed/ tolerated supine therex to b/l LEs with no complaints  Pt motivated throughout session  Pt making slow but steady progress toward goals  Pt was left sitting OOB in recliner at the end of PT session with all needs in reach  Pt would benefit from continued PT services while in hospital to address remaining limitations  PT to continue to follow pt and recommends rehab upon d/c  The patient's AM-PAC Basic Mobility Inpatient Short Form Raw Score is 7  A Raw score of less than or equal to 16 suggests the patient may benefit from discharge to post-acute rehabilitation services  Please also refer to the recommendation of the Physical Therapist for safe discharge planning  Barriers to Discharge Inaccessible home environment;Decreased caregiver support   Goals   Patient Goals to get OOB   STG Expiration Date 12/07/22   PT Treatment Day 3   Plan   Treatment/Interventions ADL retraining;Functional transfer training;LE strengthening/ROM; Therapeutic exercise;Patient/family training;Bed mobility;Gait training;Spoke to nursing;Spoke to case management;OT   Progress Progressing toward goals   PT Frequency 2-3x/wk   Recommendation   PT Discharge Recommendation Post acute rehabilitation services   AM-PAC Basic Mobility Inpatient   Turning in Bed Without Bedrails 2   Lying on Back to Sitting on Edge of Flat Bed 1   Moving Bed to Chair 1   Standing Up From Chair 1   Walk in Room 1   Climb 3-5 Stairs 1   Basic Mobility Inpatient Raw Score 7   Highest Level Of Mobility   JH-HLM Goal 2: Bed activities/Dependent transfer   JH-HLM Achieved 4: Move to chair/commode   Education   Education Provided Mobility training   Patient Demonstrates acceptance/verbal understanding   End of Consult   Patient Position at End of Consult Bedside chair;Bed/Chair alarm activated; All needs within reach     Darlene Mclean, PT, DPT

## 2022-11-30 NOTE — PROGRESS NOTES
Progress note - Palliative and Supportive Care   Cesar Anguiano 61 y o  male [id number]    Patient Active Problem List   Diagnosis   • Type 2 diabetes mellitus without complication, with long-term current use of insulin (HCC)   • Benign essential hypertension   • Mixed hyperlipidemia   • Erectile dysfunction   • Low testosterone   • Obesity (BMI 30-39  9)   • Testicular hypogonadism   • Incarcerated umbilical hernia   • Left ureteral calculus   • Type 2 diabetes mellitus with hyperlipidemia (HCC)   • Colonic mass   • Microcytic anemia   • Hypokalemia   • Transaminitis   • Thrombocytosis   • Iron deficiency anemia, unspecified   • Malignant neoplasm of transverse colon (HCC)   • 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 liver [facility])   • Colon cancer metastasized to liver [facility])   • Colostomy prolapse (HCC)   • Other fatigue   • Cervical radiculopathy   • Encephalopathy   • Flash pulmonary edema (HCC)   • MR (mitral regurgitation)   • Bacteremia   • ESBL (extended spectrum beta-lactamase) producing bacteria infection   • Acute respiratory failure with hypoxia ([address] )     Active issues specifically addressed today include:   • Metastatic colon cancer status post transverse colectomy on 11/07, followed by right hemicolectomy on 11/16 and loop ileostomy on 11/17   • Cancer associated pain   • Nausea -resolved  • Insomnia-improved  • Palliative care patient  • Delirium    Plan:  1  Symptom management   #Cancer associated pain  • Acetaminophen 650 mg p o  q 6h scheduled  • Duloxetine 30 mg daily - decreased from 60 mg 11/29/22 per geriatric recommendation, agree with current dosage     • Oxy IR 5 mg p o  q 6h scheduled  • Oxy IR 5-10 mg q 4 hours p r n  moderate-to-severe pain  • Dilaudid 0 3 mg IV q 3 hours p r n  breakthrough pain  • Gabapentin 100 mg b i d  discontinued on 11/29/22 11/29/22 OME: 0    #Nausea  • Zofran 4 mg IV q 4 hours p r n      #Insomnia  • Melatonin 6 mg HS   • Seroquel 25 mg HS    #delirium  - Recommend global delirium precautions including:   - Establishment of day/night cycle via lights during the day and blinds open   Please limit interruptions at night as medically appropriate  - Provide glasses/hearing aids as apprioriate     - Minimize deliriogenic meds as able  - Provide reorientation including date on board and visible clock  - Avoid restraints as able, frequent verbal reorientations or patient care sitter as appropriate  2  Goals - Ongoing medical optimization aimed towards efforts of functional recovery as able, without limits at this time  3  Psychosocial support   • Patient is supported by his spouse of 20 years, Vika, along with their 2 children Robert Cortez in [address] [place] [address] following  • 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     Code Status: full - Level 1   Decisional apparatus:  Patient is not competent on my exam today  If competence is lost, patient's substitute decision maker would default to spouse by [place]  Advance Directive / Living Will / POLST:  None documented  Interval history:  Patient sitting in chair this morning during assessment  He reports back pain in the chair and requested to be moved back to his bed  Patient does not appear as confused as yesterday  Geriatrics was consulted yesterday by primary team in regards to increasing delirium  MEDICATIONS / ALLERGIES:     all current active meds have been reviewed    Allergies   Allergen Reactions   • Shellfish-Derived Products - Food Allergy Anaphylaxis   • Erythromycin GI Intolerance       OBJECTIVE:    Physical Exam  Physical Exam  Vitals and nursing note reviewed  Constitutional:       General: He is awake  He is not in acute distress  Appearance: He is not toxic-appearing or diaphoretic  HENT:      Head: Normocephalic and atraumatic  Mouth/Throat:      Pharynx: Oropharynx is clear  Eyes:      Pupils: Pupils are equal, round, and reactive to light     Cardiovascular: Rate and Rhythm: Normal rate  Pulmonary:      Effort: Pulmonary effort is normal  No respiratory distress  Abdominal:      General: There is no distension  Musculoskeletal:      Cervical back: Neck supple  Right lower leg: No edema  Left lower leg: No edema  Skin:     General: Skin is warm and dry  Neurological:      Mental Status: He is alert  Psychiatric:         Behavior: Behavior normal          Lab Results: I have personally reviewed pertinent labs   Imaging Studies:   CTH w/o contrast 11/29/22  IMPRESSION:     No acute intracranial abnormality  EKG, Pathology, and Other Studies: none    Counseling / Coordination of Care    Total floor / unit time spent today 30 minutes  Greater than 50% of total time was spent with the patient and / or family counseling and / or coordination of care   A description of the counseling / coordination of care: time assessing patient, chart review, symptom management

## 2022-11-30 NOTE — PROGRESS NOTES
Progress Note - Infectious Disease   Marguerite Kelley 61 y o  male MRN: [id number]  Unit/Bed#: [facility] 901-01 Encounter: [id number]      Impression/Recommendations:  1  Sepsis, secondary to intra-abdominal infection and bacteremia   Patient is clinically much improved on antibiotic and status post I&D/bowel resection   WBC is much decreased, near normal   Temperature is down   Patient completed antibiotic course  Observe off antibiotic  Monitor temperature/WBC  Monitor hemodynamics      2  ESBL producing E coli bacteremia, likely secondary to gut translocation during recent colon surgery   Patient is clinically improved   Bacteremia cleared   Patient completed antibiotic course  No further antibiotic needed for this      3  Anastomotic leak up with intra-abdominal abscess/infected hematoma   Patient is status post exploratory laparotomy, with drainage of abscess and cecum resection   Operative culture growing ESBL producing E coli again, with lactobacillus   Patient had repeat ex lap, with healthy-appearing bowel   Repeat CT with resolved intra-abdominal collections   Patient completed antibiotic course  He remains well off antibiotic  Observe off for antibiotic  Wound care/VAC per surgery      4  Metastatic colon cancer, with liver metastases   Patient is status post transverse colon resection  Surgical oncology follow-up      5  ALEXY, superimposed on CKD   Creatinine worsen postop but improving again  Monitor creatinine       6  Acute hypoxic respiratory failure, previously on ventilator, but now extubated   No clinical pneumonia  Respiratory status is good, with patient not requiring any O2 supplement  Monitor respiratory status      7  Encephalopathy, multifactorial   Mental status slowly improving, near normal now  Monitor mental status      8  Type 2 DM      Discussed with patient in detail regarding the above plan  With no further active infection, I will sign off  Thank you for the consultation  Please do not hesitate to reconsult us for any further questions or issues      Antibiotics:  Off antibiotic     Subjective:  Patient is comfortable, awake and alert  He has some abdominal wall pain today  Mental status is good  Temperature stays down   No chills  He is tolerating antibiotic well   No nausea, vomiting or diarrhea      Objective:  Vitals:  Temp:  [97 7 °F (36 5 °C)-98 2 °F (36 8 °C)] 97 7 °F (36 5 °C)  HR:  [] 100  Resp:  [16-18] 16  BP: (141-175)/(74-88) 161/88  SpO2:  [91 %-95 %] 95 %  Temp (24hrs), Av 9 °F (36 6 °C), Min:97 7 °F (36 5 °C), Max:98 2 °F (36 8 °C)  Current: Temperature: 97 7 °F (36 5 °C)    Physical Exam:     General: Awake, alert, cooperative, no distress  Neck:  Supple  No mass  No lymphadenopathy  Lungs: Expansion symmetric, no rales, no wheezing, respirations unlabored  Heart:  Regular rate and rhythm, S1 and S2 normal, no murmur  Abdomen: Soft, nondistended, incision clean, with VAC in place  No purulence  No erythema  Mild tenderness  Extremities: Mild and improved edema  No erythema/warmth  No ulcer  Nontender to palpation  Skin:  No rash  Neuro: Moves all extremities  Invasive Devices     Central Venous Catheter Line  Duration           Port A Cath 03/10/22 Right Chest 264 days          Peripheral Intravenous Line  Duration           Peripheral IV 22 Left;Proximal;Ventral (anterior) Forearm 2 days    Peripheral IV 22 Right;Ventral (anterior) Forearm 2 days          Drain  Duration           Closed/Suction Drain Left Abdomen Bulb 19 Fr  13 days    Ileostomy LUQ 12 days                Labs studies:   I have personally reviewed pertinent labs    Results from last 7 days   Lab Units 22  0643 22  0440 22  0506 22  0512 22  0459 22  1436 22  0517   POTASSIUM mmol/L 3 9 3 9 3 6   < > 4 0   < > 4 0   CHLORIDE mmol/L 119* 119* 123*   < > 127*   < > 124*   CO2 mmol/L  22   < > 22   < > 25 BUN mg/dL 20 26* 24   < > 30*   < > 36*   CREATININE mg/dL 1 13 1 27 1 34*   < > 1 37*   < > 1 42*   EGFR ml/min/1 73sq m 71 61 57   < > 56   < > 54   CALCIUM mg/dL 8 1* 8 3 8 1*   < > 7 9*   < > 7 8*   AST U/L  --   --   --   --  67*  --  68*   ALT U/L  --   --   --   --  19  --  19   ALK PHOS U/L  --   --   --   --  436*  --  467*    < > = values in this interval not displayed  Results from last 7 days   Lab Units 11/29/22  0643 11/28/22  0440 11/27/22  0618 11/27/22  0506   WBC Thousand/uL 10 57* 11 89*  --  12 84*   HEMOGLOBIN g/dL 8 1* 8 0* 6 9* 6 8*   PLATELETS Thousands/uL 415* 433*  --  448*           Imaging Studies:   I have personally reviewed pertinent imaging study reports and images in PACS  EKG, Pathology, and Other Studies:   I have personally reviewed pertinent reports

## 2022-11-30 NOTE — PROGRESS NOTES
Progress Note - Demetrio Zamora 61 y o  male MRN: [id number]    Unit/Bed#: [facility] 901-01 Encounter: [id number]      Assessment/Plan:  Encephalopathy  Waxing and waning  Present: alert and oriented x3, periods of hallucinations (pt aware of hallucinations)  Multifactorial:  Infection, pain, altered electrolytes (Na 146 from 153), pain, insomnia, medications  Last recorded BM 11/26/22  Decreased pain medications per palliative  Maintain delirium precautions:  Provide frequent redirection, reorientation, distraction techniques  Avoid deliriogenic medications such as tramadol, benzodiazepines, anticholinergics,  Benadryl  Treat pain, See geriatric pain protocol  Monitor for constipation and urinary retention  Encourage early and frequent moblization, OOB  Encourage Hydration/ Nutrition  Implement sleep hygiene, limit night time interuptions, group activities    continue with decreased dose of cymbalta 30 mg po daily    Continue seroquel 25 mg po QHS     Insomnia  Related to hospitalization, medication, pain  Pt transferred to MS floor, improved sleep environment  First line is behavioral therapy  Avoid sedative hypnotics such as benzodiazepines and benadryl  Encourage staying awake during the day  Encourage daytime activity, morning exercise  Decrease or eliminate day time naps  Avoid caffiene, alcohol especially during late afternoon and evening hours  Establish a night time routine  Continue seroquel at present, continue to monitor     Acute pain due to surgery  Scheduled acetaminophen 650 mg po Q6  Oxycodone 5 mg po Q 4 prn moderate pain  Oxycodone 10 mg po Q 4 prn severe pain   IV dilaudid 0 3 mg prn breakthrough pain  Monitor for constipation  Lidoderm patch  Medications adjusted per pain/ primary team     Cervical radiculopathy left   Prior to arrival taking robaxin and cymbalta  Reduce cymbalta as above  Robaxin held on admission     Sepsis/ ESBL bacteremia  Completed abx  ID on consult     Metastatic colon ca with liver mets  S/p transverse colon resection, vac  Surgery on consult    Subjective:   Upon exam pt is oob in recliner chair  He is alert and oriented x 3, periods of confusion  He denies hallucinations  He states he did not sleep well last night due to pain  Per nursing pt appeared to sleep well last night, offered no complaints of pain  Pt not eating  Objective:     Vitals: Blood pressure 161/88, pulse 100, temperature 97 7 °F (36 5 °C), resp  rate 16, height 5' 8" (1 727 m), weight 114 kg (251 lb 8 7 oz), SpO2 95 %  ,Body mass index is 38 25 kg/m²  Intake/Output Summary (Last 24 hours) at 11/30/2022 1133  Last data filed at 11/30/2022 0900  Gross per 24 hour   Intake 360 ml   Output 855 ml   Net -495 ml       Physical Exam:   General : NAD  HEENT : MMM   Heart : Normal rate, no murmur rub or gallop  Lungs : CTA no wheezes, rales or rhonchi  Abdomen : Soft, NT/ND, BS auscultated in all 4 quads  Ext :  no edema  Skin : Pink, warm, dry, age appropriate turgor and mobility  Neuro : Nonfocal  Psych : Alert and O x 3, periods of confusion      Invasive Devices     Central Venous Catheter Line  Duration           Port A Cath 03/10/22 Right Chest 265 days          Peripheral Intravenous Line  Duration           Peripheral IV 11/27/22 Left;Proximal;Ventral (anterior) Forearm 2 days    Peripheral IV 11/28/22 Right;Ventral (anterior) Forearm 2 days          Drain  Duration           Closed/Suction Drain Left Abdomen Bulb 19 Fr  13 days    Ileostomy LUQ 12 days                Lab, Imaging and other studies: I have personally reviewed pertinent reports      VTE Pharmacologic Prophylaxis: Sequential compression device (Venodyne)   VTE Mechanical Prophylaxis: sequential compression device

## 2022-11-30 NOTE — PLAN OF CARE
Problem: PHYSICAL THERAPY ADULT  Goal: Performs mobility at highest level of function for planned discharge setting  See evaluation for individualized goals  Description: Treatment/Interventions: Functional transfer training, LE strengthening/ROM, Therapeutic exercise, Endurance training, Patient/family training, Equipment eval/education, Bed mobility, Gait training, Cognitive reorientation, Spoke to nursing          See flowsheet documentation for full assessment, interventions and recommendations  Outcome: Progressing  Note: Prognosis: Fair  Problem List: Decreased strength, Decreased range of motion, Decreased endurance, Impaired balance, Decreased mobility, Decreased coordination, Decreased cognition, Impaired judgement, Decreased safety awareness, Pain, Decreased skin integrity  Assessment: Pt seen for PT treatment session this date  Therapy session focused on bed mobility, functional transfers, gait training and therapeutic exercise in order to improve overall mobility and independence  Pt requires assist of 2 for all mobility performed this date  Pt with improved cognition, responds appropriately throughout session  Pt requires increased time to complete all tasks as well as frequent rest breaks 2* fatigue  Pt performed 2x STS with b/l HHA, able to take small steps to bedside chair  Pt performed/ tolerated supine therex to b/l LEs with no complaints  Pt motivated throughout session  Pt making slow but steady progress toward goals  Pt was left sitting OOB in recliner at the end of PT session with all needs in reach  Pt would benefit from continued PT services while in hospital to address remaining limitations  PT to continue to follow pt and recommends rehab upon d/c  The patient's AM-PAC Basic Mobility Inpatient Short Form Raw Score is 7  A Raw score of less than or equal to 16 suggests the patient may benefit from discharge to post-acute rehabilitation services   Please also refer to the recommendation of the Physical Therapist for safe discharge planning  Barriers to Discharge: Inaccessible home environment, Decreased caregiver support     PT Discharge Recommendation: Post acute rehabilitation services    See flowsheet documentation for full assessment

## 2022-11-30 NOTE — PLAN OF CARE
Problem: OCCUPATIONAL THERAPY ADULT  Goal: Performs self-care activities at highest level of function for planned discharge setting  See evaluation for individualized goals  Description: Treatment Interventions: ADL retraining, Functional transfer training, UE strengthening/ROM, Endurance training, Patient/family training, Equipment evaluation/education, Compensatory technique education, Energy conservation, Activityengagement          See flowsheet documentation for full assessment, interventions and recommendations  Note: Limitation: Decreased ADL status, Decreased UE ROM, Decreased UE strength, Decreased Safe judgement during ADL, Decreased cognition, Decreased endurance, Decreased self-care trans, Decreased high-level ADLs  Prognosis: Fair  Assessment: Patient participated in Skilled OT session this date with interventions consisting of ADL re training with the use of correct body mechnaics, Energy Conservation techniques, deep breathing technique, safety awareness and fall prevention techniques,  therapeutic activities to: increase activity tolerance, increase dynamic sit/ stand balance during functional activity  and increase OOB/ sitting tolerance   Upon arrival patient was found supine in bed  Pt demonstrated the following tasks: MAX A x 2 sup to sit, STS, and fnxl mobility with RW  Pt requires MIN A for hair care and MAX A for LBD  Pt very motivated to improve and very participatory in session  Patient continues to be functioning below baseline level, occupational performance remains limited secondary to factors listed above and increased risk for falls and injury  From OT standpoint, recommendation at time of d/c would be STR  Patient to benefit from continued Occupational Therapy treatment while in the hospital to address deficits as defined above and maximize level of functional independence with ADLs and functional mobility   Pt was left in chair with alarm on after session with all current needs met  The patient's raw score on the AM-PAC Daily Activity inpatient short form is 14, standardized score is 33 39, less than 39 4  Patients at this level are likely to benefit from discharge to post-acute rehabilitation services  Please refer to the recommendation of the Occupational Therapist for safe discharge planning       OT Discharge Recommendation: Post acute rehabilitation services

## 2022-12-01 LAB
ANION GAP SERPL CALCULATED.3IONS-SCNC: 8 MMOL/L (ref 4–13)
BASOPHILS # BLD AUTO: 0.03 THOUSANDS/ÂΜL (ref 0–0.1)
BASOPHILS NFR BLD AUTO: 0 % (ref 0–1)
BUN SERPL-MCNC: 19 MG/DL (ref 5–25)
CALCIUM SERPL-MCNC: 8.4 MG/DL (ref 8.3–10.1)
CHLORIDE SERPL-SCNC: 115 MMOL/L (ref 96–108)
CO2 SERPL-SCNC: 18 MMOL/L (ref 21–32)
CREAT SERPL-MCNC: 0.95 MG/DL (ref 0.6–1.3)
EOSINOPHIL # BLD AUTO: 0 THOUSAND/ÂΜL (ref 0–0.61)
EOSINOPHIL NFR BLD AUTO: 0 % (ref 0–6)
ERYTHROCYTE [DISTWIDTH] IN BLOOD BY AUTOMATED COUNT: 15.9 % (ref 11.6–15.1)
GFR SERPL CREATININE-BSD FRML MDRD: 87 ML/MIN/1.73SQ M
GLUCOSE SERPL-MCNC: 119 MG/DL (ref 65–140)
GLUCOSE SERPL-MCNC: 133 MG/DL (ref 65–140)
GLUCOSE SERPL-MCNC: 151 MG/DL (ref 65–140)
GLUCOSE SERPL-MCNC: 153 MG/DL (ref 65–140)
GLUCOSE SERPL-MCNC: 204 MG/DL (ref 65–140)
GLUCOSE SERPL-MCNC: 76 MG/DL (ref 65–140)
HCT VFR BLD AUTO: 25.6 % (ref 36.5–49.3)
HGB BLD-MCNC: 8.1 G/DL (ref 12–17)
IMM GRANULOCYTES # BLD AUTO: 0.1 THOUSAND/UL (ref 0–0.2)
IMM GRANULOCYTES NFR BLD AUTO: 1 % (ref 0–2)
LYMPHOCYTES # BLD AUTO: 0.81 THOUSANDS/ÂΜL (ref 0.6–4.47)
LYMPHOCYTES NFR BLD AUTO: 9 % (ref 14–44)
MCH RBC QN AUTO: 28.2 PG (ref 26.8–34.3)
MCHC RBC AUTO-ENTMCNC: 31.6 G/DL (ref 31.4–37.4)
MCV RBC AUTO: 89 FL (ref 82–98)
MONOCYTES # BLD AUTO: 1.03 THOUSAND/ÂΜL (ref 0.17–1.22)
MONOCYTES NFR BLD AUTO: 11 % (ref 4–12)
NEUTROPHILS # BLD AUTO: 7.58 THOUSANDS/ÂΜL (ref 1.85–7.62)
NEUTS SEG NFR BLD AUTO: 79 % (ref 43–75)
NRBC BLD AUTO-RTO: 0 /100 WBCS
PLATELET # BLD AUTO: 448 THOUSANDS/UL (ref 149–390)
PMV BLD AUTO: 10.4 FL (ref 8.9–12.7)
POTASSIUM SERPL-SCNC: 3.7 MMOL/L (ref 3.5–5.3)
RBC # BLD AUTO: 2.87 MILLION/UL (ref 3.88–5.62)
SODIUM SERPL-SCNC: 141 MMOL/L (ref 135–147)
WBC # BLD AUTO: 9.55 THOUSAND/UL (ref 4.31–10.16)

## 2022-12-01 RX ORDER — POTASSIUM CHLORIDE 20 MEQ/1
40 TABLET, EXTENDED RELEASE ORAL ONCE
Status: COMPLETED | OUTPATIENT
Start: 2022-12-01 | End: 2022-12-01

## 2022-12-01 RX ADMIN — ACETAMINOPHEN 650 MG: 325 TABLET ORAL at 11:46

## 2022-12-01 RX ADMIN — CHLORHEXIDINE GLUCONATE 15 ML: 1.2 SOLUTION ORAL at 08:14

## 2022-12-01 RX ADMIN — CHLORHEXIDINE GLUCONATE 15 ML: 1.2 SOLUTION ORAL at 22:00

## 2022-12-01 RX ADMIN — INSULIN GLARGINE 22 UNITS: 100 INJECTION, SOLUTION SUBCUTANEOUS at 22:00

## 2022-12-01 RX ADMIN — AMLODIPINE BESYLATE 5 MG: 5 TABLET ORAL at 08:14

## 2022-12-01 RX ADMIN — QUETIAPINE FUMARATE 25 MG: 25 TABLET ORAL at 22:00

## 2022-12-01 RX ADMIN — ACETAMINOPHEN 650 MG: 325 TABLET ORAL at 16:58

## 2022-12-01 RX ADMIN — POTASSIUM CHLORIDE 40 MEQ: 1500 TABLET, EXTENDED RELEASE ORAL at 11:45

## 2022-12-01 RX ADMIN — ACETAMINOPHEN 650 MG: 325 TABLET ORAL at 06:28

## 2022-12-01 RX ADMIN — INSULIN LISPRO 6 UNITS: 100 INJECTION, SOLUTION INTRAVENOUS; SUBCUTANEOUS at 08:16

## 2022-12-01 RX ADMIN — ATORVASTATIN CALCIUM 40 MG: 40 TABLET, FILM COATED ORAL at 16:58

## 2022-12-01 RX ADMIN — FOLIC ACID 1 MG: 1 TABLET ORAL at 08:14

## 2022-12-01 RX ADMIN — OXYCODONE HYDROCHLORIDE 10 MG: 5 SOLUTION ORAL at 11:46

## 2022-12-01 RX ADMIN — ACETAMINOPHEN 650 MG: 325 TABLET ORAL at 00:37

## 2022-12-01 RX ADMIN — ENOXAPARIN SODIUM 40 MG: 40 INJECTION SUBCUTANEOUS at 08:14

## 2022-12-01 RX ADMIN — PANTOPRAZOLE SODIUM 40 MG: 40 TABLET, DELAYED RELEASE ORAL at 06:28

## 2022-12-01 RX ADMIN — DULOXETINE HYDROCHLORIDE 30 MG: 30 CAPSULE, DELAYED RELEASE ORAL at 08:14

## 2022-12-01 RX ADMIN — MELATONIN 6 MG: at 22:00

## 2022-12-01 RX ADMIN — CYANOCOBALAMIN TAB 500 MCG 500 MCG: 500 TAB at 08:14

## 2022-12-01 RX ADMIN — INSULIN LISPRO 6 UNITS: 100 INJECTION, SOLUTION INTRAVENOUS; SUBCUTANEOUS at 11:48

## 2022-12-01 RX ADMIN — AMLODIPINE BESYLATE 5 MG: 5 TABLET ORAL at 16:58

## 2022-12-01 NOTE — PROGRESS NOTES
Progress Note - Surgical Oncology  Darrion Grant 61 y o  male MRN: [id number]  Unit/Bed#: [facility] 901-01 Encounter: [id number]      Objective:  Patient doing well this morning  No nausea no vomiting  It is noted and I knows that he had 350 of emesis in the morning yesterday , also states that he had a 600 mL estimated blood loss yesterday  There was no apparent bleeding and that was never signed out to the nurses on report  I spoke with the charge nurse and she will speak with did manager on 9 and find out from day shift what the estimated blood loss was from  There was no apparent bleeding from his ileostomy wound or anywhere else on his body  Patient states he ate half his lunch of salmon with 100% of his rice all his peaches and glycerin a  For dinner patient 8 half is check in 100% of his speech is as well as his clothes CRNA  His ileostomy is functioning well  His VAC was changed yesterday with pictures in the chart  Patient was out of bed to a chair  and work with physical therapy yesterday  Patient is still extremely weak  Patient still preferring to urinate in the urinal in bed  Patient continues to have ileostomy care and teaching  450 UA (days not documented)  425 ileostomy  350 emesis in a m  (not sure if  he did have a bout of emesis not documented)  VAC output not documented   VICTORINO drain not documented    Blood pressure 150/81, pulse 83, temperature 97 5 °F (36 4 °C), resp  rate 18, height 5' 8" (1 727 m), weight 114 kg (251 lb 8 7 oz), SpO2 90 %  ,Body mass index is 38 25 kg/m²        Intake/Output Summary (Last 24 hours) at 12/1/2022 0526  Last data filed at 11/30/2022 2234  Gross per 24 hour   Intake 360 ml   Output 1475 ml   Net -1115 ml       Invasive Devices     Central Venous Catheter Line  Duration           Port A Cath 03/10/22 Right Chest 265 days          Peripheral Intravenous Line  Duration           Peripheral IV 11/28/22 Right;Ventral (anterior) Forearm 3 days          Drain Duration           Closed/Suction Drain Left Abdomen Bulb 19 Fr  14 days    Ileostomy LUQ 13 days                Physical Exam:   Patient is awake and orientated x3 this morning, did say he had a hallucination of leaving the hospital and coming back yesterday  Abdomen:  Is soft does not appear distended, bowel sounds are present, ileostomy is functioning well, VAC is intact midline, right horizontal incision is clean with an opened area were 2 staples were removed yesterday draining minimal serous fluid on dressing, David-Austin drain is serous, minimal output in drain  Extremities:  Has 1+ pedal edema no calf edema, no calf tenderness bilaterally    Lab, Imaging and other studies:  Pending  VTE Pharmacologic Prophylaxis: Enoxaparin (Lovenox)  VTE Mechanical Prophylaxis: sequential compression device    Assessment:  POD # 24 exploratory laparotomy, resection segment 3 of the liver, transverse colectomy, reversal loop colostomy  POD # 20 of extubation  POD # 15 exploratory laparotomy, right hemicolectomy, left discontinuity, ABThera VAC  POD # 14 exploratory laparotomy partial descending colectomy primary anastomosis, diverting loop ileostomy, midline VAC  POD # 9 of extubation    Plan:  1  Have manager of [address] [place] [address]  9 review I&Os  2  More accurate documentation of this patient's I&Os  3  Patient is to continue to work with PT OT and be out of bed and ambulating in the halls  4  Continue ileostomy care and teaching  5  Continue calorie count for 1 more day  6  Continue Lovenox subQ for DVT prophylaxis  7  Case management is working on placement to rehab  8  ?  Wet to dry dressing mid abdominal wound instead of back since subcutaneous tissue is still minimal  9  Check a m  Labs  10  Continue endocrinology recommendations  11   Continue Infectious Disease recommendations, as well as geriatrics

## 2022-12-01 NOTE — CASE MANAGEMENT
Case Management Discharge Planning Note    Patient name Satish Qiu  Location [facility] 901-01 MRN [id number]  : 1965 Date 2022       Current Admission Date: 2022  Current Admission Diagnosis:Colon cancer metastasized to liver [facility])   Patient Active Problem List    Diagnosis Date Noted   • Flash pulmonary edema ([facility] ) 2022   • MR (mitral regurgitation) 2022   • Bacteremia 2022   • ESBL (extended spectrum beta-lactamase) producing bacteria infection 2022   • Acute respiratory failure with hypoxia ([facility] ) 2022   • Encephalopathy 2022   • Cervical radiculopathy 10/26/2022   • Other fatigue 06/15/2022   • Colostomy prolapse ([facility] ) 2022   • Colon cancer metastasized to liver [facility] ) 2022   • 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 liver ([facility] ) 2022   • Iron deficiency anemia, unspecified 2022   • Malignant neoplasm of transverse colon ([facility] ) 2022   • Thrombocytosis 2022   • Hypokalemia 2022   • Transaminitis 2022   • Type 2 diabetes mellitus with hyperlipidemia ([facility] ) 2022   • Colonic mass 2022   • Microcytic anemia 2022   • Left ureteral calculus 2020   • Incarcerated umbilical hernia    • Testicular hypogonadism 2017   • Low testosterone 2017   • Type 2 diabetes mellitus without complication, with long-term current use of insulin ([facility] ) 2016   • Benign essential hypertension 2016   • Mixed hyperlipidemia 2016   • Erectile dysfunction 2016   • Obesity (BMI 30-39 9) 2016      LOS (days): 24  Geometric Mean LOS (GMLOS) (days): 9 80  Days to GMLOS:-14 2     OBJECTIVE:  Risk of Unplanned Readmission Score: 36 91         Current admission status: Inpatient   Preferred Pharmacy:   [facility] [id number] [address] [place] [address] Drive  [address] [address]  Phone: [phone] Fax: [address]  [address]  [facility] 3  [address] [place] [id number]  Phone: [phone] Fax: [phone]    [id number]- [facility] [place] - [address] [facility] [place] [address]  Phone: [phone] Fax: [phone]    Primary Care Provider: Kellen Evans MD    Primary Insurance: [employer]  Secondary Insurance:     DISCHARGE DETAILS:    Discharge planning discussed with[de-identified] Vika (spouse)  Freedom of Choice: Yes     CM contacted family/caregiver?: Yes  Were Treatment Team discharge recommendations reviewed with patient/caregiver?: Yes  Did patient/caregiver verbalize understanding of patient care needs?: Yes  Were patient/caregiver advised of the risks associated with not following Treatment Team discharge recommendations?: Yes    Contacts  Patient Contacts: Pippa Hensley (spouse)  Relationship to Patient[de-identified] Family  Contact Method: Phone  Phone Number: [phone]  Reason/Outcome: Continuity of Care, Emergency Contact, Referral, Discharge Planning              Other Referral/Resources/Interventions Provided:  Referral Comments: CM spoke with spouse regarding STR options  Family preference was to try and get as close to home as possible  Original referral was sent to within a 10 mile radius  Facilities however were denying to due bed availability, insurance, or not being able to manage the wound vac  CM reached out to facilities again today and the bed availabity has not changed  CM did speak with spouse about accepting facilties that can accomodate the patient  She is in agreement with [facility] location, however does prefer the locations of [place] vs [place] to remain as close to home as possible  Facility has been udpated at this time  Treatment Team Recommendation: Short Term Rehab  Discharge Destination Plan[de-identified] Short Term Rehab                 Additional Comments: CM spoke with provider   Patient is not medically cleared for discharge at this time  Plan is for the wound vac to come off tomorrow and continue with wet to dry dressings

## 2022-12-01 NOTE — PROGRESS NOTES
-- Patient:  -- MRN: [id number]  -- Aidin Request ID: [id number]  -- Level of care reserved: Herminio Wall  -- [facility] [address] [place] [address] [phone]  -- Clinical needs requested:  -- Geography searched: 30 miles around [facility]  -- Start of Service:  -- Request sent: 1:33pm EST on 11/27/2022 by Candy Olvera  -- Partner reserved: 11:24am EST on 12/1/2022 by Cate Ansari  -- Choice list shared: 10:53am EST on 12/1/2022 by Cate Ansari

## 2022-12-01 NOTE — PROGRESS NOTES
Progress note - Palliative and Supportive Care   Judy Cervantes 61 y o  male [id number]    Patient Active Problem List   Diagnosis   • Type 2 diabetes mellitus without complication, with long-term current use of insulin (HCC)   • Benign essential hypertension   • Mixed hyperlipidemia   • Erectile dysfunction   • Low testosterone   • Obesity (BMI 30-39  9)   • Testicular hypogonadism   • Incarcerated umbilical hernia   • Left ureteral calculus   • Type 2 diabetes mellitus with hyperlipidemia (HCC)   • Colonic mass   • Microcytic anemia   • Hypokalemia   • Transaminitis   • Thrombocytosis   • Iron deficiency anemia, unspecified   • Malignant neoplasm of transverse colon (HCC)   • 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 liver [facility])   • Colon cancer metastasized to liver [facility])   • Colostomy prolapse (HCC)   • Other fatigue   • Cervical radiculopathy   • Encephalopathy   • Flash pulmonary edema (HCC)   • MR (mitral regurgitation)   • Bacteremia   • ESBL (extended spectrum beta-lactamase) producing bacteria infection   • Acute respiratory failure with hypoxia ([facility] )     Active issues specifically addressed today include:   • Metastatic colon cancer status post transverse colectomy on 11/07, followed by right hemicolectomy on 11/16 and loop ileostomy on 11/17   • Cancer associated pain   • Nausea -resolved  • Insomnia-improved  • Palliative care patient  • Delirium    Plan:  1   Symptom management   #Cancer associated pain  Continue regimen as below:  • Acetaminophen 650 mg p o  q 6h scheduled  • Duloxetine 30 mg daily  • Oxy IR 5 mg p o  q 6h scheduled  • Oxy IR 5-10 mg q 4 hours p r n  moderate-to-severe pain  • Dilaudid 0 3 mg IV q 3 hours p r n  breakthrough pain  11/30/22 OME: 21 (notably had wound VAC change requiring IV dilaudid)     #Nausea  • Zofran 4 mg IV q 4 hours p r n       #Insomnia  • Melatonin 6 mg HS   • Seroquel 25 mg HS    #delirium  - Recommend global delirium precautions including:   - Establishment of day/night cycle via lights during the day and blinds open   Please limit interruptions at night as medically appropriate  - Provide glasses/hearing aids as apprioriate     - Minimize deliriogenic meds as able  - Provide reorientation including date on board and visible clock  - Avoid restraints as able, frequent verbal reorientations or patient care sitter as appropriate  2  Goals - Ongoing medical optimization aimed towards efforts of functional recovery as able, without limits at this time      3  Psychosocial support   • Patient is supported by his spouse of 20 years, Vika, along with their 2 children Ashley Carbajal in [address] [place] [address] following  • 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  •    Code Status: Full - Level 1   Decisional apparatus:  Patient is competent on my exam today  If competence is lost, patient's substitute decision maker would default to spouse by [place]  Advance Directive / Living Will / POLST:  None on file  Interval history:  VAC changed yesterday  Patient was OOB to chair yesterday and worked with PT  Patient reports feeling tired today  Says he did not sleep well due to the room being hot  Patient oriented and appears less confused today  MEDICATIONS / ALLERGIES:     all current active meds have been reviewed    Allergies   Allergen Reactions   • Shellfish-Derived Products - Food Allergy Anaphylaxis   • Erythromycin GI Intolerance       OBJECTIVE:    Physical Exam  Physical Exam  Vitals and nursing note reviewed  Constitutional:       General: He is not in acute distress  Appearance: He is well-developed  He is not ill-appearing, toxic-appearing or diaphoretic  HENT:      Head: Normocephalic and atraumatic  Mouth/Throat:      Pharynx: Oropharynx is clear  Eyes:      Pupils: Pupils are equal, round, and reactive to light  Cardiovascular:      Rate and Rhythm: Normal rate     Pulmonary:      Effort: Pulmonary effort is normal  No respiratory distress  Abdominal:      General: There is no distension  Musculoskeletal:      Cervical back: Neck supple  Right lower leg: No edema  Left lower leg: No edema  Skin:     General: Skin is warm and dry  Neurological:      General: No focal deficit present  Mental Status: He is alert  Mental status is at baseline  Psychiatric:         Mood and Affect: Mood normal          Behavior: Behavior normal          Lab Results:   I have personally reviewed pertinent labs  , CBC:   Lab Results   Component Value Date    WBC 9 55 12/01/2022    HGB 8 1 (L) 12/01/2022    HCT 25 6 (L) 12/01/2022    MCV 89 12/01/2022     (H) 12/01/2022    MCH 28 2 12/01/2022    MCHC 31 6 12/01/2022    RDW 15 9 (H) 12/01/2022    MPV 10 4 12/01/2022    NRBC 0 12/01/2022   Imaging Studies: N/A  EKG, Pathology, and Other Studies: N/A    Counseling / Coordination of Care    Total floor / unit time spent today 20 minutes  Greater than 50% of total time was spent with the patient and / or family counseling and / or coordination of care  A description of the counseling / coordination of care: time assessing patient, chart review

## 2022-12-01 NOTE — QUICK NOTE
Call wife Елена Ren and updated her with our plans  VAC removal tomorrow with wet to dry dressing  Small packing of right small horizontal wound  Patient eating better  Able to tolerate small meals   Patient better being of the Gabapentin  More coherent for longer periods of time

## 2022-12-01 NOTE — PLAN OF CARE
Problem: MOBILITY - ADULT  Goal: Maintain or return to baseline ADL function  Description: INTERVENTIONS:  -  Assess patient's ability to carry out ADLs; assess patient's baseline for ADL function and identify physical deficits which impact ability to perform ADLs (bathing, care of mouth/teeth, toileting, grooming, dressing, etc )  - Assess/evaluate cause of self-care deficits   - Assess range of motion  - Assess patient's mobility; develop plan if impaired  - Assess patient's need for assistive devices and provide as appropriate  - Encourage maximum independence but intervene and supervise when necessary  - Involve family in performance of ADLs  - Assess for home care needs following discharge   - Consider OT consult to assist with ADL evaluation and planning for discharge  - Provide patient education as appropriate  Outcome: Progressing  Goal: Maintains/Returns to pre admission functional level  Description: INTERVENTIONS:  - Perform BMAT or MOVE assessment daily    - Set and communicate daily mobility goal to care team and patient/family/caregiver  - Collaborate with rehabilitation services on mobility goals if consulted  - Perform Range of Motion 3 times a day  - Reposition patient every 3 hours    - Dangle patient 3 times a day  - Stand patient 3 times a day  - Ambulate patient 3 times a day  - Out of bed to chair 3 times a day   - Out of bed for meals 3 times a day  - Out of bed for toileting  - Record patient progress and toleration of activity level   Outcome: Progressing     Problem: Prexisting or High Potential for Compromised Skin Integrity  Goal: Skin integrity is maintained or improved  Description: INTERVENTIONS:  - Identify patients at risk for skin breakdown  - Assess and monitor skin integrity  - Assess and monitor nutrition and hydration status  - Monitor labs   - Assess for incontinence   - Turn and reposition patient  - Assist with mobility/ambulation  - Relieve pressure over bony prominences  - Avoid friction and shearing  - Provide appropriate hygiene as needed including keeping skin clean and dry  - Evaluate need for skin moisturizer/barrier cream  - Collaborate with interdisciplinary team   - Patient/family teaching  - Consider wound care consult   Outcome: Progressing     Problem: Potential for Falls  Goal: Patient will remain free of falls  Description: INTERVENTIONS:  - Educate patient/family on patient safety including physical limitations  - Instruct patient to call for assistance with activity   - Consult OT/PT to assist with strengthening/mobility   - Keep Call bell within reach  - Keep bed low and locked with side rails adjusted as appropriate  - Keep care items and personal belongings within reach  - Initiate and maintain comfort rounds  - Make Fall Risk Sign visible to staff  - Offer Toileting every 3 Hours, in advance of need  - Initiate/Maintain 3alarm  - Obtain necessary fall risk management equipment: 3  - Apply yellow socks and bracelet for high fall risk patients  - Consider moving patient to room near nurses station  Outcome: Progressing     Problem: Nutrition/Hydration-ADULT  Goal: Nutrient/Hydration intake appropriate for improving, restoring or maintaining nutritional needs  Description: Monitor and assess patient's nutrition/hydration status for malnutrition  Collaborate with interdisciplinary team and initiate plan and interventions as ordered  Monitor patient's weight and dietary intake as ordered or per policy  Utilize nutrition screening tool and intervene as necessary  Determine patient's food preferences and provide high-protein, high-caloric foods as appropriate       INTERVENTIONS:  - Monitor oral intake, urinary output, labs, and treatment plans  - Assess nutrition and hydration status and recommend course of action  - Evaluate amount of meals eaten  - Assist patient with eating if necessary   - Allow adequate time for meals  - Recommend/ encourage appropriate diets, oral nutritional supplements, and vitamin/mineral supplements  - Order, calculate, and assess calorie counts as needed  - Recommend, monitor, and adjust tube feedings and TPN/PPN based on assessed needs  - Assess need for intravenous fluids  - Provide specific nutrition/hydration education as appropriate  - Include patient/family/caregiver in decisions related to nutrition  Outcome: Progressing     Problem: PAIN - ADULT  Goal: Verbalizes/displays adequate comfort level or baseline comfort level  Description: Interventions:  - Encourage patient to monitor pain and request assistance  - Assess pain using appropriate pain scale  - Administer analgesics based on type and severity of pain and evaluate response  - Implement non-pharmacological measures as appropriate and evaluate response  - Consider cultural and social influences on pain and pain management  - Notify physician/advanced practitioner if interventions unsuccessful or patient reports new pain  Outcome: Progressing     Problem: INFECTION - ADULT  Goal: Absence or prevention of progression during hospitalization  Description: INTERVENTIONS:  - Assess and monitor for signs and symptoms of infection  - Monitor lab/diagnostic results  - Monitor all insertion sites, i e  indwelling lines, tubes, and drains  - Monitor endotracheal if appropriate and nasal secretions for changes in amount and color  - [place] appropriate cooling/warming therapies per order  - Administer medications as ordered  - Instruct and encourage patient and family to use good hand hygiene technique  - Identify and instruct in appropriate isolation precautions for identified infection/condition  Outcome: Progressing  Goal: Absence of fever/infection during neutropenic period  Description: INTERVENTIONS:  - Monitor WBC    Outcome: Progressing     Problem: SAFETY ADULT  Goal: Maintain or return to baseline ADL function  Description: INTERVENTIONS:  -  Assess patient's ability to carry out ADLs; assess patient's baseline for ADL function and identify physical deficits which impact ability to perform ADLs (bathing, care of mouth/teeth, toileting, grooming, dressing, etc )  - Assess/evaluate cause of self-care deficits   - Assess range of motion  - Assess patient's mobility; develop plan if impaired  - Assess patient's need for assistive devices and provide as appropriate  - Encourage maximum independence but intervene and supervise when necessary  - Involve family in performance of ADLs  - Assess for home care needs following discharge   - Consider OT consult to assist with ADL evaluation and planning for discharge  - Provide patient education as appropriate  Outcome: Progressing  Goal: Maintains/Returns to pre admission functional level  Description: INTERVENTIONS:  - Perform BMAT or MOVE assessment daily    - Set and communicate daily mobility goal to care team and patient/family/caregiver  - Collaborate with rehabilitation services on mobility goals if consulted  - Perform Range of Motion 3 times a day  - Reposition patient every 3 hours    - Dangle patient 3 times a day  - Stand patient 3 times a day  - Ambulate patient 3 times a day  - Out of bed to chair 3 times a day   - Out of bed for meals 3 times a day  - Out of bed for toileting  - Record patient progress and toleration of activity level   Outcome: Progressing  Goal: Patient will remain free of falls  Description: INTERVENTIONS:  - Educate patient/family on patient safety including physical limitations  - Instruct patient to call for assistance with activity   - Consult OT/PT to assist with strengthening/mobility   - Keep Call bell within reach  - Keep bed low and locked with side rails adjusted as appropriate  - Keep care items and personal belongings within reach  - Initiate and maintain comfort rounds  - Make Fall Risk Sign visible to staff  - Offer Toileting every 3 Hours, in advance of need  - Initiate/Maintain 3alarm  - Obtain necessary fall risk management equipment: 3  - Apply yellow socks and bracelet for high fall risk patients  - Consider moving patient to room near nurses station  Outcome: Progressing     Problem: DISCHARGE PLANNING  Goal: Discharge to home or other facility with appropriate resources  Description: INTERVENTIONS:  - Identify barriers to discharge w/patient and caregiver  - Arrange for needed discharge resources and transportation as appropriate  - Identify discharge learning needs (meds, wound care, etc )  - Arrange for interpretive services to assist at discharge as needed  - Refer to Case Management Department for coordinating discharge planning if the patient needs post-hospital services based on physician/advanced practitioner order or complex needs related to functional status, cognitive ability, or social support system  Outcome: Progressing     Problem: Knowledge Deficit  Goal: Patient/family/caregiver demonstrates understanding of disease process, treatment plan, medications, and discharge instructions  Description: Complete learning assessment and assess knowledge base    Interventions:  - Provide teaching at level of understanding  - Provide teaching via preferred learning methods  Outcome: Progressing     Problem: RESPIRATORY - ADULT  Goal: Achieves optimal ventilation and oxygenation  Description: INTERVENTIONS:  - Assess for changes in respiratory status  - Assess for changes in mentation and behavior  - Position to facilitate oxygenation and minimize respiratory effort  - Oxygen administered by appropriate delivery if ordered  - Initiate smoking cessation education as indicated  - Encourage broncho-pulmonary hygiene including cough, deep breathe, Incentive Spirometry  - Assess the need for suctioning and aspirate as needed  - Assess and instruct to report SOB or any respiratory difficulty  - Respiratory Therapy support as indicated  Outcome: Progressing     Problem: GASTROINTESTINAL - ADULT  Goal: Minimal or absence of nausea and/or vomiting  Description: INTERVENTIONS:  - Administer IV fluids if ordered to ensure adequate hydration  - Maintain NPO status until nausea and vomiting are resolved  - Nasogastric tube if ordered  - Administer ordered antiemetic medications as needed  - Provide nonpharmacologic comfort measures as appropriate  - Advance diet as tolerated, if ordered  - Consider nutrition services referral to assist patient with adequate nutrition and appropriate food choices  Outcome: Progressing  Goal: Maintains or returns to baseline bowel function  Description: INTERVENTIONS:  - Assess bowel function  - Encourage oral fluids to ensure adequate hydration  - Administer IV fluids if ordered to ensure adequate hydration  - Administer ordered medications as needed  - Encourage mobilization and activity  - Consider nutritional services referral to assist patient with adequate nutrition and appropriate food choices  Outcome: Progressing  Goal: Maintains adequate nutritional intake  Description: INTERVENTIONS:  - Monitor percentage of each meal consumed  - Identify factors contributing to decreased intake, treat as appropriate  - Assist with meals as needed  - Monitor I&O, weight, and lab values if indicated  - Obtain nutrition services referral as needed  Outcome: Progressing  Goal: Establish and maintain optimal ostomy function  Description: INTERVENTIONS:  - Assess bowel function  - Encourage oral fluids to ensure adequate hydration  - Administer IV fluids if ordered to ensure adequate hydration   - Administer ordered medications as needed  - Encourage mobilization and activity  - Nutrition services referral to assist patient with appropriate food choices  - Assess stoma site  - Consider wound care consult   Outcome: Progressing  Goal: Oral mucous membranes remain intact  Description: INTERVENTIONS  - Assess oral mucosa and hygiene practices  - Implement preventative oral hygiene regimen  - Implement oral medicated treatments as ordered  - Initiate Nutrition services referral as needed  Outcome: Progressing     Problem: GENITOURINARY - ADULT  Goal: Maintains or returns to baseline urinary function  Description: INTERVENTIONS:  - Assess urinary function  - Encourage oral fluids to ensure adequate hydration if ordered  - Administer IV fluids as ordered to ensure adequate hydration  - Administer ordered medications as needed  - Offer frequent toileting  - Follow urinary retention protocol if ordered  Outcome: Progressing  Goal: Absence of urinary retention  Description: INTERVENTIONS:  - Assess patient’s ability to void and empty bladder  - Monitor I/O  - Bladder scan as needed  - Discuss with physician/AP medications to alleviate retention as needed  - Discuss catheterization for long term situations as appropriate  Outcome: Progressing  Goal: Urinary catheter remains patent  Description: INTERVENTIONS:  - Assess patency of urinary catheter  - If patient has a chronic rivero, consider changing catheter if non-functioning  - Follow guidelines for intermittent irrigation of non-functioning urinary catheter  Outcome: Progressing     Problem: METABOLIC, FLUID AND ELECTROLYTES - ADULT  Goal: Electrolytes maintained within normal limits  Description: INTERVENTIONS:  - Monitor labs and assess patient for signs and symptoms of electrolyte imbalances  - Administer electrolyte replacement as ordered  - Monitor response to electrolyte replacements, including repeat lab results as appropriate  - Instruct patient on fluid and nutrition as appropriate  Outcome: Progressing  Goal: Fluid balance maintained  Description: INTERVENTIONS:  - Monitor labs   - Monitor I/O and WT  - Instruct patient on fluid and nutrition as appropriate  - Assess for signs & symptoms of volume excess or deficit  Outcome: Progressing  Goal: Glucose maintained within target range  Description: INTERVENTIONS:  - Monitor Blood Glucose as ordered  - Assess for signs and symptoms of hyperglycemia and hypoglycemia  - Administer ordered medications to maintain glucose within target range  - Assess nutritional intake and initiate nutrition service referral as needed  Outcome: Progressing

## 2022-12-01 NOTE — PLAN OF CARE
Problem: MOBILITY - ADULT  Goal: Maintain or return to baseline ADL function  Description: INTERVENTIONS:  -  Assess patient's ability to carry out ADLs; assess patient's baseline for ADL function and identify physical deficits which impact ability to perform ADLs (bathing, care of mouth/teeth, toileting, grooming, dressing, etc )  - Assess/evaluate cause of self-care deficits   - Assess range of motion  - Assess patient's mobility; develop plan if impaired  - Assess patient's need for assistive devices and provide as appropriate  - Encourage maximum independence but intervene and supervise when necessary  - Involve family in performance of ADLs  - Assess for home care needs following discharge   - Consider OT consult to assist with ADL evaluation and planning for discharge  - Provide patient education as appropriate  Outcome: Progressing     Problem: Prexisting or High Potential for Compromised Skin Integrity  Goal: Skin integrity is maintained or improved  Description: INTERVENTIONS:  - Identify patients at risk for skin breakdown  - Assess and monitor skin integrity  - Assess and monitor nutrition and hydration status  - Monitor labs   - Assess for incontinence   - Turn and reposition patient  - Assist with mobility/ambulation  - Relieve pressure over bony prominences  - Avoid friction and shearing  - Provide appropriate hygiene as needed including keeping skin clean and dry  - Evaluate need for skin moisturizer/barrier cream  - Collaborate with interdisciplinary team   - Patient/family teaching  - Consider wound care consult   Outcome: Progressing     Problem: Potential for Falls  Goal: Patient will remain free of falls  Description: INTERVENTIONS:  - Educate patient/family on patient safety including physical limitations  - Instruct patient to call for assistance with activity   - Consult OT/PT to assist with strengthening/mobility   - Keep Call bell within reach  - Keep bed low and locked with side rails adjusted as appropriate  - Keep care items and personal belongings within reach  - Initiate and maintain comfort rounds  - Make Fall Risk Sign visible to staff  - Offer Toileting every 2 Hours, in advance of need  - Initiate/Maintain bed alarm  - Obtain necessary fall risk management equipment:   - Apply yellow socks and bracelet for high fall risk patients  - Consider moving patient to room near nurses station  Outcome: Progressing     Problem: SAFETY ADULT  Goal: Maintain or return to baseline ADL function  Description: INTERVENTIONS:  -  Assess patient's ability to carry out ADLs; assess patient's baseline for ADL function and identify physical deficits which impact ability to perform ADLs (bathing, care of mouth/teeth, toileting, grooming, dressing, etc )  - Assess/evaluate cause of self-care deficits   - Assess range of motion  - Assess patient's mobility; develop plan if impaired  - Assess patient's need for assistive devices and provide as appropriate  - Encourage maximum independence but intervene and supervise when necessary  - Involve family in performance of ADLs  - Assess for home care needs following discharge   - Consider OT consult to assist with ADL evaluation and planning for discharge  - Provide patient education as appropriate  Outcome: Progressing

## 2022-12-01 NOTE — UTILIZATION REVIEW
Continued Stay Review    Date: 11/30-12/1                          Current Patient Class: inpatient  Current Level of Care: med/surg  HPI:57 y o  male with metastatic colon cancer initially admitted on 11/7 -- s/p segment 3 liver resection and takedown of transverse colostomy and transverse colectomy, followed by exploratory laparotomy with right hemicolectomy and loop ileostomy for anastomotic leak  POD # 24 exploratory laparotomy, resection segment 3 of the liver, transverse colectomy, reversal loop colostomy  POD # 20 of extubation  POD # 15 exploratory laparotomy, right hemicolectomy, left discontinuity, ABThera VAC  POD # 14 exploratory laparotomy partial descending colectomy primary anastomosis, diverting loop ileostomy, midline VAC  POD # 9 of extubation    Assessment/Plan:   11/30 -- 350 ml of emesis this AM, also states he had 600 ml estimated of blood loss yesterday, but no apparent bleeding noted by nursing  Pt states he has been eating most of his meals  Ileostomy functioning well  VAC changed yesterday  He has been OOB to chair yesterday, still extremely weak  Prefers to urinate in urinal  Lovenox and SCDs  Requested more accurate I/O recording  Continues to have ileostomy care and teaching  Continue to work with PT/OT, currently recommending IP rehab at d/c  Needs aggressive pulmonary toilet  VAC change today with possible closure  Pain control per palliative care  Gabapentin d/c'd on 11/29  Continue duloxetine 30 mg daily, decreased from 60 mg 11/29  Continue oxy and dilaudid prn for breakthrough pain  Zofran prn  Continue to monitor off abx  ID signing off       12/1 -- Pt A&O x3 this AM, encephalopathy improving   Abdomen is soft does not appear distended, bowel sounds are present, ileostomy is functioning well, VAC is intact midline, right horizontal incision is clean with an opened area were 2 staples were removed yesterday draining minimal serous fluid on dressing, VICTORINO drain is serous, minimal output in drain  1+ pedal edema  Continue Lovenox and scds  Diet as julia  Can do wet to dry dressings for the midline incision  Wound packing for colostomy site  Plan for [facility] removal tomorrow with wed to dry dressing  Continue to monitor off antibiotics  Encourage incentive spirometry, OOB/ambulate  Needs aggressive PT         Vital Signs:   Date/Time Temp Pulse Resp BP MAP (mmHg) SpO2 O2 Device   12/01/22 06:55:38 97 2 °F (36 2 °C) Abnormal  93 16 176/86 Abnormal  116 94 % --   11/30/22 22:15:45 -- 83 18 150/81 104 90 % --   11/30/22 2100 -- -- -- -- -- 92 % None (Room air)   11/30/22 14:44:59 97 5 °F (36 4 °C) 98 18 155/86 109 90 % --   11/30/22 08:40:51 -- 100 -- 161/88 112 95 % --   11/30/22 08:36:43 -- 99 -- 157/79 105 92 % --   11/30/22 07:06:33 -- 81 16 141/85 104 92 % --   11/29/22 22:28:09 97 7 °F (36 5 °C) 90 18 175/85 Abnormal  115 91 % --   11/29/22 17:56:39 -- 96 -- 149/84 106 94 % --   11/29/22 14:23:49 97 9 °F (36 6 °C) 91 -- 147/82 104 93 % --   11/29/22 1130 98 2 °F (36 8 °C) -- -- 144/74 -- -- --   11/29/22 0900 -- -- -- -- -- 95 % None (Room air)   11/29/22 0815 97 3 °F (36 3 °C) Abnormal  -- -- 138/79 -- -- --   11/29/22 0800 -- 80 -- 138/79 104 95 % --       Pertinent Labs/Diagnostic Results:     Results from last 7 days   Lab Units 12/01/22  0832 11/29/22  0643 11/28/22  0440 11/27/22  0618 11/27/22  0506   WBC Thousand/uL 9 55 10 57* 11 89*  --  12 84*   HEMOGLOBIN g/dL 8 1* 8 1* 8 0* 6 9* 6 8*   HEMATOCRIT % 25 6* 27 0* 25 2* 22 2* 21 9*   PLATELETS Thousands/uL 448* 415* 433*  --  448*   NEUTROS ABS Thousands/µL 7 58 8 57* 9 15*  --  10 20*     Results from last 7 days   Lab Units 12/01/22  0832 11/29/22  0643 11/28/22  0440 11/27/22  0506 11/26/22  0512 11/25/22  0459   SODIUM mmol/L 141 146 145 150* 148* 153*   POTASSIUM mmol/L 3 7 3 9 3 9 3 6 3 7 4 0   CHLORIDE mmol/L 115* 119* 119* 123* 121* 127*   CO2 mmol/L 18* 22 22 22 23 22   ANION GAP mmol/L 8 5 4 5 4 4   BUN mg/dL 19 20 26* 24 27* 30*   CREATININE mg/dL 0 95 1 13 1 27 1 34* 1 39* 1 37*   EGFR ml/min/1 73sq m 87 71 61 57 55 56   CALCIUM mg/dL 8 4 8 1* 8 3 8 1* 8 2* 7 9*   MAGNESIUM mg/dL  --  2 1  --   --   --  2 3   PHOSPHORUS mg/dL  --  3 5  --   --   --  3 3     Results from last 7 days   Lab Units 12/01/22  1140 12/01/22  0746 11/30/22  2125 11/30/22  1617 11/30/22  1145 11/30/22  0752 11/29/22  2100 11/29/22  1737 11/29/22  1148 11/29/22  0914 11/29/22  0619 11/29/22  0546   POC GLUCOSE mg/dl 119 133 204* 167* 132 133 118 153* 125 164* 150* 157*     Results from last 7 days   Lab Units 12/01/22  0832 11/29/22  0643 11/28/22  0440 11/27/22  0506 11/26/22  0512 11/25/22  0459 11/24/22  1436   GLUCOSE RANDOM mg/dL 153* 167* 174* 111 285* 227* 252*         Medications:   Scheduled Medications:  acetaminophen, 650 mg, Oral, Q6H SHANNON  amLODIPine, 5 mg, Oral, BID  atorvastatin, 40 mg, Oral, After Dinner  chlorhexidine, 15 mL, Mouth/Throat, Q12H [facility]  cyanocobalamin, 500 mcg, Oral, Daily  DULoxetine, 30 mg, Oral, Daily  enoxaparin, 40 mg, Subcutaneous, K57S [facility]  folic acid, 1 mg, Oral, Daily  insulin glargine, 22 Units, Subcutaneous, HS  insulin lispro, 1-5 Units, Subcutaneous, TID AC  insulin lispro, 6 Units, Subcutaneous, TID With Meals  melatonin, 6 mg, Oral, HS  pantoprazole, 40 mg, Oral, Early Morning  QUEtiapine, 25 mg, Oral, HS    PRN Meds:  HYDROmorphone, 0 3 mg, Intravenous, Q3H PRN 11/30 x1  naloxone, 0 04 mg, Intravenous, Q1MIN PRN  ondansetron, 4 mg, Intravenous, Q4H PRN  oxyCODONE, 5 mg, Oral, Q4H PRN 11/30 x2   Or  oxyCODONE, 10 mg, Oral, Q4H PRN 12/1 x1        Discharge Plan: TBD    Network Utilization Review Department  ATTENTION: Please call with any questions or concerns to [phone] and carefully listen to the prompts so that you are directed to the right person   All voicemails are confidential   Sudie Crigler all requests for admission clinical reviews, approved or denied determinations and any other requests to dedicated fax number below belonging to the campus where the patient is receiving treatment   List of dedicated fax numbers for the Facilities:  [address] DENIALS (Administrative/Medical Necessity) [phone]   [address] (Maternity/NICU/Pediatrics) [phone]   [address] [phone]   [address] [phone]   [address] [phone]   [address] [facility] [address] [facility] [phone]

## 2022-12-01 NOTE — PROGRESS NOTES
Progress Note - Geriatric Medicine   Martín Alva 61 y o  male MRN: [id number]  Unit/Bed#: [facility] 901-01 Encounter: [id number]      Assessment/Plan:    1  Metastatic colon ca with liver mets  -S/p segment 3 liver resection and takedown of transverse colostomy and transverse colectomy  Also s/p ex lap with right hemicolectomy and loop ileostomy for anastomotic leak  -surg onc managing  -plan to d/c to post acute rehab   -contributing to #2    2  ESBL bacteremia- now cleared  -ESBL bacteremia 2/2 gut translocation from colonic surgery  -ID following  -patient off of Abx  -plan to d/c to post acute rehab  -contributing to #3     3  Encephalopathy, multifactorial- resolved  -patient with prolonged hospital stay so far, with periods of waxing and waning mental status  Also had hallucinations intermittently, although patient knew they were hallucinations  -multifactorial in setting of bacteremia, extensive surgical intervention and take backs, sedation and intubation, antibiotic and pain medications, pain, electrolyte derangements, ALEXY, poor sleep quality  -currently mental status returned to baseline, although at risk of recurrent encephalopathy especially with sleep deprivation, ongoing pain, and possibility for more procedural intervention  -[facility] on 11/29 without acute intracranial pathology or obvious metastatic lesions  -delirium precautions in place with redirection/reorientation as appropriate, limited disruptions during sleep, medication cleanup with avoidance of benzos, anticholinergics, benadryl, and reduced pain meds as able  -palliative managing pain medications and reducing them as appropriate  -patient toileting appropriately  -continue with decreased dose of cymbalta 30 mg po daily    -Continue seroquel 25 mg po QHS     #   Insomnia  Related to hospitalization, medication, pain  Encourage staying awake during the day, daytime activity, PT participation  Decrease or eliminate day time naps  Avoid caffiene, alcohol especially during late afternoon and evening hours  Establish a night time routine  Continue seroquel 25 mg HS  Cont melatonin 6 mg HS  Pain control as per below     #  Cancer and operative associated pain  -palliative managing pain regimen  -Scheduled acetaminophen 650 mg po q6h  -PRN Oxy 5-10 mg po Q 4 prn moderate-severe pain   -IV dilaudid 0 3 mg prn breakthrough pain  -bowel regimen  -gabapentin 100 mg bid d/c on 11/29  -duloxetine 30 mg qd    #  Frailty  -Level 7 severe frailty currently given events of hospitalization and severity of illness, however at home is level 1 (very fit, does all ALDs and participates in community activities)  -pending eventual placement to post-acute rehab    #  Impaired vision  -wears corrective lenses for reading, reachable at bedside       Subjective:   Patient feeling exhausted this morning  Had another rough night of sleep  Very uncomfortable overall and too weak to change positions without assistance  Wanted to sleep on his side more, however unable to do so comfortably given wound vac/drains/abdominal interventions  Complained of awakening drenched in sweat and feeling chilly last night  Temp was afebrile  No further episodes of chills after temperature was turned up in his room  Appetite very poor  Nothing sounds good to the patient but he tries to eat very small sips/bites very slowly  Feels he gets full and nauseated immediately  No new cough, CP, SOB  He does have SOB if laying flat in bed  Abdomen is distended but soft and unchanged from prior  Pain at baseline  Has neuropathy in his feet which is occasionally bothersome, but ok currently  Review of Systems   Constitutional: Positive for appetite change (anorexia), chills (overnight, now resolved) and fatigue  Negative for fever  Respiratory: Positive for shortness of breath (only if laying flat)  Negative for cough and wheezing      Cardiovascular: Negative for chest pain, palpitations and leg swelling  Gastrointestinal: Positive for abdominal distention (unchanged from yesterday), abdominal pain (unchanged from yesterday) and nausea  Negative for constipation, diarrhea and vomiting  Neurological: Negative for headaches  Psychiatric/Behavioral: Positive for sleep disturbance (difficulty sleeping for various reasons)  Negative for agitation, confusion, decreased concentration, dysphoric mood and hallucinations  The patient is not nervous/anxious  Objective:     Vitals: Blood pressure (!) 176/86, pulse 93, temperature (!) 97 2 °F (36 2 °C), resp  rate 16, height 5' 8" (1 727 m), weight 114 kg (251 lb 8 7 oz), SpO2 94 %  ,Body mass index is 38 25 kg/m²  Intake/Output Summary (Last 24 hours) at 12/1/2022 5109  Last data filed at 12/1/2022 0601  Gross per 24 hour   Intake 0 ml   Output 2550 ml   Net -2550 ml       Current Medications: Reviewed    Physical Exam:   Physical Exam  Constitutional:       General: He is not in acute distress  Appearance: He is obese  He is ill-appearing  He is not toxic-appearing or diaphoretic  Comments: Deconditioned, weak, uncomfortable with movement     Cardiovascular:      Rate and Rhythm: Normal rate and regular rhythm  Pulmonary:      Effort: Pulmonary effort is normal  No respiratory distress  Breath sounds: No wheezing  Abdominal:      General: There is distension  Tenderness: There is abdominal tenderness (appropriate tenderness with movement )  There is no guarding  Comments: Wound vac and drains in place, ostomy pink with softly formed brown-yellow stool in bag   Skin:     General: Skin is warm  Neurological:      Mental Status: He is alert and oriented to person, place, and time  Psychiatric:         Mood and Affect: Mood normal          Behavior: Behavior normal          Thought Content:  Thought content normal          Judgment: Judgment normal           Invasive Devices     Central Venous Catheter Line Duration           Port A Cath 03/10/22 Right Chest 265 days          Peripheral Intravenous Line  Duration           Peripheral IV 11/28/22 Right;Ventral (anterior) Forearm 3 days          Drain  Duration           Closed/Suction Drain Left Abdomen Bulb 19 Fr  14 days    Ileostomy LUQ 13 days                Lab, Imaging and other studies: I have personally reviewed pertinent films in PACS   CT head done for delirium and metastasis work-up not showing any acute intracranial pathologies to contribute to acute delirium   No intracranial masses

## 2022-12-02 LAB
GLUCOSE SERPL-MCNC: 124 MG/DL (ref 65–140)
GLUCOSE SERPL-MCNC: 127 MG/DL (ref 65–140)
GLUCOSE SERPL-MCNC: 159 MG/DL (ref 65–140)
GLUCOSE SERPL-MCNC: 77 MG/DL (ref 65–140)

## 2022-12-02 PROCEDURE — 2W53X6Z REMOVAL OF PRESSURE DRESSING ON ABDOMINAL WALL: ICD-10-PCS | Performed by: SURGERY

## 2022-12-02 RX ADMIN — AMLODIPINE BESYLATE 5 MG: 5 TABLET ORAL at 18:17

## 2022-12-02 RX ADMIN — ACETAMINOPHEN 650 MG: 325 TABLET ORAL at 22:57

## 2022-12-02 RX ADMIN — INSULIN LISPRO 6 UNITS: 100 INJECTION, SOLUTION INTRAVENOUS; SUBCUTANEOUS at 09:27

## 2022-12-02 RX ADMIN — ACETAMINOPHEN 650 MG: 325 TABLET ORAL at 12:16

## 2022-12-02 RX ADMIN — AMLODIPINE BESYLATE 5 MG: 5 TABLET ORAL at 07:39

## 2022-12-02 RX ADMIN — OXYCODONE HYDROCHLORIDE 10 MG: 5 SOLUTION ORAL at 07:40

## 2022-12-02 RX ADMIN — MELATONIN 6 MG: at 22:57

## 2022-12-02 RX ADMIN — ACETAMINOPHEN 650 MG: 325 TABLET ORAL at 07:39

## 2022-12-02 RX ADMIN — CHLORHEXIDINE GLUCONATE 15 ML: 1.2 SOLUTION ORAL at 07:39

## 2022-12-02 RX ADMIN — ENOXAPARIN SODIUM 40 MG: 40 INJECTION SUBCUTANEOUS at 07:39

## 2022-12-02 RX ADMIN — HYDROMORPHONE HYDROCHLORIDE 0.3 MG: 1 INJECTION, SOLUTION INTRAMUSCULAR; INTRAVENOUS; SUBCUTANEOUS at 11:23

## 2022-12-02 RX ADMIN — ACETAMINOPHEN 650 MG: 325 TABLET ORAL at 18:18

## 2022-12-02 RX ADMIN — QUETIAPINE FUMARATE 25 MG: 25 TABLET ORAL at 22:57

## 2022-12-02 RX ADMIN — ACETAMINOPHEN 650 MG: 325 TABLET ORAL at 00:27

## 2022-12-02 RX ADMIN — INSULIN GLARGINE 22 UNITS: 100 INJECTION, SOLUTION SUBCUTANEOUS at 23:00

## 2022-12-02 RX ADMIN — PANTOPRAZOLE SODIUM 40 MG: 40 TABLET, DELAYED RELEASE ORAL at 07:39

## 2022-12-02 RX ADMIN — CYANOCOBALAMIN TAB 500 MCG 500 MCG: 500 TAB at 07:39

## 2022-12-02 RX ADMIN — INSULIN LISPRO 1 UNITS: 100 INJECTION, SOLUTION INTRAVENOUS; SUBCUTANEOUS at 12:16

## 2022-12-02 RX ADMIN — FOLIC ACID 1 MG: 1 TABLET ORAL at 07:39

## 2022-12-02 RX ADMIN — INSULIN LISPRO 6 UNITS: 100 INJECTION, SOLUTION INTRAVENOUS; SUBCUTANEOUS at 12:16

## 2022-12-02 RX ADMIN — ATORVASTATIN CALCIUM 40 MG: 40 TABLET, FILM COATED ORAL at 18:17

## 2022-12-02 RX ADMIN — DULOXETINE HYDROCHLORIDE 30 MG: 30 CAPSULE, DELAYED RELEASE ORAL at 07:39

## 2022-12-02 RX ADMIN — CHLORHEXIDINE GLUCONATE 15 ML: 1.2 SOLUTION ORAL at 22:56

## 2022-12-02 NOTE — MALNUTRITION/BMI
This medical record reflects one or more clinical indicators suggestive of malnutrition     Malnutrition Findings:   Adult Malnutrition type: Acute illness  Adult Degree of Malnutrition: Other severe protein calorie malnutrition  Malnutrition Characteristics: Inadequate energy, Fluid accumulation       360 Statement: Related to catabolic illness as evidenced by <50% energy intake needs met >5 days and Generalized +3 edema treated with diet and oral nutrition supplements        See Nutrition note dated 12/2/22 for additional details  Completed nutrition assessment is viewable in the nutrition documentation

## 2022-12-02 NOTE — CASE MANAGEMENT
Case Management Discharge Planning Note    Patient name Eloise Mane  Location [facility]01-01 MRN [id number]  : 1965 Date 2022       Current Admission Date: 2022  Current Admission Diagnosis:Colon cancer metastasized to liver [facility])   Patient Active Problem List    Diagnosis Date Noted   • Flash pulmonary edema ([address] ) 2022   • MR (mitral regurgitation) 2022   • Bacteremia 2022   • ESBL (extended spectrum beta-lactamase) producing bacteria infection 2022   • Acute respiratory failure with hypoxia ([address] ) 2022   • Encephalopathy 2022   • Cervical radiculopathy 10/26/2022   • Other fatigue 06/15/2022   • Colostomy prolapse ([address] ) 2022   • Colon cancer metastasized to liver ([facility] ) 2022   • 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 liver ([facility] ) 2022   • Iron deficiency anemia, unspecified 2022   • Malignant neoplasm of transverse colon ([facility] ) 2022   • Thrombocytosis 2022   • Hypokalemia 2022   • Transaminitis 2022   • Type 2 diabetes mellitus with hyperlipidemia ([facility] ) 2022   • Colonic mass 2022   • Microcytic anemia 2022   • Left ureteral calculus 2020   • Incarcerated umbilical hernia    • Testicular hypogonadism 2017   • Low testosterone 2017   • Type 2 diabetes mellitus without complication, with long-term current use of insulin ([facility] ) 2016   • Benign essential hypertension 2016   • Mixed hyperlipidemia 2016   • Erectile dysfunction 2016   • Obesity (BMI 30-39 9) 2016      LOS (days): 25  Geometric Mean LOS (GMLOS) (days): 9 80  Days to GMLOS:-15 1     OBJECTIVE:  Risk of Unplanned Readmission Score: 31 15         Current admission status: Inpatient   Preferred Pharmacy:   [id number] - [address] [place] [address] Drive  [address] [place] [address]  Phone: [phone] Fax: [address]  [address]  [facility] 3  [address] [place] [id number]  Phone: [phone] Fax: [phone]    [id number] [place] - [address] [facility] [address]  Phone: [phone] Fax: [phone]    Primary Care Provider: Nolvia Henley MD    Primary Insurance: [employer]  Secondary Insurance:     DISCHARGE DETAILS:    Discharge planning discussed with[de-identified] Kati (spouse)  Freedom of Choice: Yes     CM contacted family/caregiver?: Yes  Were Treatment Team discharge recommendations reviewed with patient/caregiver?: Yes  Did patient/caregiver verbalize understanding of patient care needs?: Yes  Were patient/caregiver advised of the risks associated with not following Treatment Team discharge recommendations?: Yes    Contacts  Patient Contacts: Harriett Simon (spouse)  Relationship to Patient[de-identified] Family  Contact Method: Phone  Phone Number: [phone]  Reason/Outcome: Continuity of Care, Emergency Contact, Referral, Discharge Planning              Other Referral/Resources/Interventions Provided:  Referral Comments: CM spoke with family regarding facility options  She continues to state that she just wants the patient as close to home as possible  [facility] and [facility] 14 miles from the home  Facility has provided information for CM to submit to [employer]  Wife is in agreement with the facility  CM has sent facilty information to d/c support  Treatment Team Recommendation: Short Term Rehab  Discharge Destination Plan[de-identified] Short Term Rehab        Additional Comments: CM spoke with provider  Patient is medically cleared for d/c tomorrow  Patient is having vac removed today and will have wet to dry dressings

## 2022-12-02 NOTE — PLAN OF CARE
Problem: PHYSICAL THERAPY ADULT  Goal: Performs mobility at highest level of function for planned discharge setting  See evaluation for individualized goals  Description: Treatment/Interventions: Functional transfer training, LE strengthening/ROM, Therapeutic exercise, Endurance training, Patient/family training, Equipment eval/education, Bed mobility, Gait training, Cognitive reorientation, Spoke to nursing          See flowsheet documentation for full assessment, interventions and recommendations  Outcome: Progressing  Note: Prognosis: Fair  Problem List: Decreased strength, Decreased range of motion, Decreased endurance, Impaired balance, Decreased mobility, Decreased coordination, Decreased cognition, Impaired judgement, Decreased safety awareness, Pain, Decreased skin integrity  Assessment: Pt seen for PT treatment session this date  Therapy session focused on functional transfers, gait training, therapeutic exercise and endurance training in order to improve overall mobility and independence  Pt requires assist of 2 for all mobility performed this date  Pt performed multiple STS to RW with mod A x2; increased standing tolerance noted  Pt with emotional after initial STS 2* increased progress- very motivated to ambulate + regain strength  Pt able to walk short distance forward with RW and close chair follow- cues for increasing step length + widening LUIS  Pt performed/ tolerated seated therex; told pt to perform 2-3x/day to increase LE strength  Pt making steady progress toward goals  Pt was left sitting OOB in recliner with alarm on at the end of PT session with all needs in reach  Pt would benefit from continued PT services while in hospital to address remaining limitations  PT to continue to follow pt and recommends rehab upon d/c  The patient's AM-PAC Basic Mobility Inpatient Short Form Raw Score is 7   A Raw score of less than or equal to 16 suggests the patient may benefit from discharge to post-acute rehabilitation services  Please also refer to the recommendation of the Physical Therapist for safe discharge planning  Barriers to Discharge: Inaccessible home environment, Decreased caregiver support     PT Discharge Recommendation: Post acute rehabilitation services    See flowsheet documentation for full assessment

## 2022-12-02 NOTE — OCCUPATIONAL THERAPY NOTE
Occupational Therapy Progress Note     Patient Name: Gene Lynn  ZWMGX'A Date: 12/2/2022  Problem List  Principal Problem:    Colon cancer metastasized to liver [facility])  Active Problems:    Benign essential hypertension    Type 2 diabetes mellitus with hyperlipidemia ([facility] )    Malignant neoplasm of transverse colon (HCC)    Encephalopathy    Flash pulmonary edema (HCC)    MR (mitral regurgitation)    Bacteremia    ESBL (extended spectrum beta-lactamase) producing bacteria infection    Acute respiratory failure with hypoxia ([facility] )            12/02/22 1105   OT Last Visit   OT Visit Date 12/02/22   Note Type   Note Type Treatment   Pain Assessment   Pain Assessment Tool 0-10   Pain Score No Pain   Effect of Pain on Daily Activities No pain at initiation of session  Does c/o of some abdominal discomfort with activity   Hospital Pain Intervention(s) Repositioned; Ambulation/increased activity   Restrictions/Precautions   Weight Bearing Precautions Per Order No   Other Precautions Cognitive; Chair Alarm; Bed Alarm; Fall Risk;Pain  (VICTORINO, ileostomy, VAC)   Lifestyle   Autonomy PTA, pt was I with ADLs, IADLs, and fnxl mobility   Reciprocal Relationships supportive family - wife and 2 kids (dtr is 15, son is 16)   Service to Others disability   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   ADL   Where Assessed Chair   LB Dressing Assistance 3  Moderate Assistance   LB Dressing Deficit Don/doff R sock; Don/doff L sock   LB Dressing Comments Performs with assist to perform tailor sit method   Bed Mobility   Supine to Sit Unable to assess   Sit to Supine Unable to assess   Additional Comments Pt seated OOB in chair upon arrival   Transfers   Sit to Stand 3  Moderate assistance   Additional items Assist x 2; Increased time required   Stand to Sit 3  Moderate assistance   Additional items Assist x 2; Increased time required   Additional Comments transfers with RW   Functional Mobility   Functional Mobility 3  Moderate assistance   Additional Comments Ax2   Additional items Rolling walker   Therapeutic Exercise - ROM   UE-ROM   (pt provided with HEP and light resistance theraband  Demonstrated each exercise for pt education  Verbalized understanding )   Cognition   Overall Cognitive Status Unable to assess   Arousal/Participation Responsive; Cooperative   Attention Attends with cues to redirect   Orientation Level Oriented X4   Following Commands Follows one step commands with increased time or repetition   Comments Pt pleasant and cooperative t/o session   Activity Tolerance   Activity Tolerance Patient limited by fatigue   Medical Staff Made Aware PT Lynn Stock, RN clearance for session   Assessment   Assessment Patient participated in Skilled OT session this date with interventions consisting of ADL re training with the use of correct body mechnaics, Energy Conservation techniques, safety awareness and fall prevention techniques,  therapeutic activities to: increase activity tolerance, increase dynamic sit/ stand balance during functional activity  and increase OOB/ sitting tolerance   Upon arrival patient was found seated OOB to Chair  Pt demonstrated the following tasks: MOD A x 2 STS and fnxl mobility with RW  Pt is able to doff and don socks with MOD A in tailor sit method  Pt also provided with HEP handout and educated on exercises with use of light resistance theraband  Patient continues to be functioning below baseline level, occupational performance remains limited secondary to factors listed above and increased risk for falls and injury  From OT standpoint, recommendation at time of d/c would be STR  Patient to benefit from continued Occupational Therapy treatment while in the hospital to address deficits as defined above and maximize level of functional independence with ADLs and functional mobility  Pt was left in chair with alarm on after session with all current needs met   The patient's raw score on the AM-PAC Daily Activity inpatient short form is 16, standardized score is 35 96, less than 39 4  Patients at this level are likely to benefit from discharge to post-acute rehabilitation services  Please refer to the recommendation of the Occupational Therapist for safe discharge planning  Plan   Treatment Interventions ADL retraining;Functional transfer training;UE strengthening/ROM; Endurance training;Cognitive reorientation;Patient/family training;Equipment evaluation/education; Compensatory technique education;Continued evaluation; Energy conservation; Activityengagement   Goal Expiration Date 12/07/22   OT Treatment Day 5   OT Frequency   (2-4x/wk)   Recommendation   OT Discharge Recommendation Post acute rehabilitation services   [facility] Daily Activity Inpatient   Lower Body Dressing 2   Bathing 2   Toileting 2   Upper Body Dressing 3   Grooming 3   Eating 4   Daily Activity Raw Score 16   Daily Activity Standardized Score (Calc for Raw Score >=11) 35 96   [facility] Applied Cognition Inpatient   Following a Speech/Presentation 3   Understanding Ordinary Conversation 4   Taking Medications 4   Remembering Where Things Are Placed or Put Away 3   Remembering List of 4-5 Errands 3   Taking Care of Complicated Tasks 2   Applied Cognition Raw Score 19   Applied Cognition Standardized Score 39 77       Tara Perez MS, OTR/L

## 2022-12-02 NOTE — QUICK NOTE
VAC removed, midline with saline wet to dry dressing placed, no further VAC  Will do wet to dry dressing  Right abdominal wound clean and dry, small corner of 4x4 placed in wound and Abdominal pad placed

## 2022-12-02 NOTE — PLAN OF CARE
Problem: MOBILITY - ADULT  Goal: Maintain or return to baseline ADL function  Description: INTERVENTIONS:  -  Assess patient's ability to carry out ADLs; assess patient's baseline for ADL function and identify physical deficits which impact ability to perform ADLs (bathing, care of mouth/teeth, toileting, grooming, dressing, etc )  - Assess/evaluate cause of self-care deficits   - Assess range of motion  - Assess patient's mobility; develop plan if impaired  - Assess patient's need for assistive devices and provide as appropriate  - Encourage maximum independence but intervene and supervise when necessary  - Involve family in performance of ADLs  - Assess for home care needs following discharge   - Consider OT consult to assist with ADL evaluation and planning for discharge  - Provide patient education as appropriate  Outcome: Progressing  Goal: Maintains/Returns to pre admission functional level  Description: INTERVENTIONS:  - Perform BMAT or MOVE assessment daily    - Set and communicate daily mobility goal to care team and patient/family/caregiver     - Collaborate with rehabilitation services on mobility goals if consulted  - Record patient progress and toleration of activity level   Outcome: Progressing     Problem: Prexisting or High Potential for Compromised Skin Integrity  Goal: Skin integrity is maintained or improved  Description: INTERVENTIONS:  - Identify patients at risk for skin breakdown  - Assess and monitor skin integrity  - Assess and monitor nutrition and hydration status  - Monitor labs   - Assess for incontinence   - Turn and reposition patient  - Assist with mobility/ambulation  - Relieve pressure over bony prominences  - Avoid friction and shearing  - Provide appropriate hygiene as needed including keeping skin clean and dry  - Evaluate need for skin moisturizer/barrier cream  - Collaborate with interdisciplinary team   - Patient/family teaching  - Consider wound care consult   Outcome: Progressing     Problem: Potential for Falls  Goal: Patient will remain free of falls  Description: INTERVENTIONS:  - Educate patient/family on patient safety including physical limitations  - Instruct patient to call for assistance with activity   - Consult OT/PT to assist with strengthening/mobility   - Keep Call bell within reach  - Keep bed low and locked with side rails adjusted as appropriate  - Keep care items and personal belongings within reach  - Initiate and maintain comfort rounds  - Make Fall Risk Sign visible to staff  - Apply yellow socks and bracelet for high fall risk patients  - Consider moving patient to room near nurses station  Outcome: Progressing     Problem: Nutrition/Hydration-ADULT  Goal: Nutrient/Hydration intake appropriate for improving, restoring or maintaining nutritional needs  Description: Monitor and assess patient's nutrition/hydration status for malnutrition  Collaborate with interdisciplinary team and initiate plan and interventions as ordered  Monitor patient's weight and dietary intake as ordered or per policy  Utilize nutrition screening tool and intervene as necessary  Determine patient's food preferences and provide high-protein, high-caloric foods as appropriate       INTERVENTIONS:  - Monitor oral intake, urinary output, labs, and treatment plans  - Assess nutrition and hydration status and recommend course of action  - Evaluate amount of meals eaten  - Assist patient with eating if necessary   - Allow adequate time for meals  - Recommend/ encourage appropriate diets, oral nutritional supplements, and vitamin/mineral supplements  - Order, calculate, and assess calorie counts as needed  - Recommend, monitor, and adjust tube feedings and TPN/PPN based on assessed needs  - Assess need for intravenous fluids  - Provide specific nutrition/hydration education as appropriate  - Include patient/family/caregiver in decisions related to nutrition  Outcome: Progressing     Problem: PAIN - ADULT  Goal: Verbalizes/displays adequate comfort level or baseline comfort level  Description: Interventions:  - Encourage patient to monitor pain and request assistance  - Assess pain using appropriate pain scale  - Administer analgesics based on type and severity of pain and evaluate response  - Implement non-pharmacological measures as appropriate and evaluate response  - Consider cultural and social influences on pain and pain management  - Notify physician/advanced practitioner if interventions unsuccessful or patient reports new pain  Outcome: Progressing     Problem: INFECTION - ADULT  Goal: Absence or prevention of progression during hospitalization  Description: INTERVENTIONS:  - Assess and monitor for signs and symptoms of infection  - Monitor lab/diagnostic results  - Monitor all insertion sites, i e  indwelling lines, tubes, and drains  - Monitor endotracheal if appropriate and nasal secretions for changes in amount and color  - [place] appropriate cooling/warming therapies per order  - Administer medications as ordered  - Instruct and encourage patient and family to use good hand hygiene technique  - Identify and instruct in appropriate isolation precautions for identified infection/condition  Outcome: Progressing  Goal: Absence of fever/infection during neutropenic period  Description: INTERVENTIONS:  - Monitor WBC    Outcome: Progressing     Problem: SAFETY ADULT  Goal: Maintain or return to baseline ADL function  Description: INTERVENTIONS:  -  Assess patient's ability to carry out ADLs; assess patient's baseline for ADL function and identify physical deficits which impact ability to perform ADLs (bathing, care of mouth/teeth, toileting, grooming, dressing, etc )  - Assess/evaluate cause of self-care deficits   - Assess range of motion  - Assess patient's mobility; develop plan if impaired  - Assess patient's need for assistive devices and provide as appropriate  - Encourage maximum independence but intervene and supervise when necessary  - Involve family in performance of ADLs  - Assess for home care needs following discharge   - Consider OT consult to assist with ADL evaluation and planning for discharge  - Provide patient education as appropriate  Outcome: Progressing  Goal: Maintains/Returns to pre admission functional level  Description: INTERVENTIONS:  - Perform BMAT or MOVE assessment daily    - Set and communicate daily mobility goal to care team and patient/family/caregiver     - Collaborate with rehabilitation services on mobility goals if consulted  - Record patient progress and toleration of activity level   Outcome: Progressing  Goal: Patient will remain free of falls  Description: INTERVENTIONS:  - Educate patient/family on patient safety including physical limitations  - Instruct patient to call for assistance with activity   - Consult OT/PT to assist with strengthening/mobility   - Keep Call bell within reach  - Keep bed low and locked with side rails adjusted as appropriate  - Keep care items and personal belongings within reach  - Initiate and maintain comfort rounds  - Make Fall Risk Sign visible to staff  - Apply yellow socks and bracelet for high fall risk patients  - Consider moving patient to room near nurses station  Outcome: Progressing     Problem: DISCHARGE PLANNING  Goal: Discharge to home or other facility with appropriate resources  Description: INTERVENTIONS:  - Identify barriers to discharge w/patient and caregiver  - Arrange for needed discharge resources and transportation as appropriate  - Identify discharge learning needs (meds, wound care, etc )  - Arrange for interpretive services to assist at discharge as needed  - Refer to Case Management Department for coordinating discharge planning if the patient needs post-hospital services based on physician/advanced practitioner order or complex needs related to functional status, cognitive ability, or social support system  Outcome: Progressing     Problem: Knowledge Deficit  Goal: Patient/family/caregiver demonstrates understanding of disease process, treatment plan, medications, and discharge instructions  Description: Complete learning assessment and assess knowledge base    Interventions:  - Provide teaching at level of understanding  - Provide teaching via preferred learning methods  Outcome: Progressing     Problem: RESPIRATORY - ADULT  Goal: Achieves optimal ventilation and oxygenation  Description: INTERVENTIONS:  - Assess for changes in respiratory status  - Assess for changes in mentation and behavior  - Position to facilitate oxygenation and minimize respiratory effort  - Oxygen administered by appropriate delivery if ordered  - Initiate smoking cessation education as indicated  - Encourage broncho-pulmonary hygiene including cough, deep breathe, Incentive Spirometry  - Assess the need for suctioning and aspirate as needed  - Assess and instruct to report SOB or any respiratory difficulty  - Respiratory Therapy support as indicated  Outcome: Progressing     Problem: GASTROINTESTINAL - ADULT  Goal: Minimal or absence of nausea and/or vomiting  Description: INTERVENTIONS:  - Administer IV fluids if ordered to ensure adequate hydration  - Maintain NPO status until nausea and vomiting are resolved  - Nasogastric tube if ordered  - Administer ordered antiemetic medications as needed  - Provide nonpharmacologic comfort measures as appropriate  - Advance diet as tolerated, if ordered  - Consider nutrition services referral to assist patient with adequate nutrition and appropriate food choices  Outcome: Progressing  Goal: Maintains or returns to baseline bowel function  Description: INTERVENTIONS:  - Assess bowel function  - Encourage oral fluids to ensure adequate hydration  - Administer IV fluids if ordered to ensure adequate hydration  - Administer ordered medications as needed  - Encourage mobilization and activity  - Consider nutritional services referral to assist patient with adequate nutrition and appropriate food choices  Outcome: Progressing  Goal: Maintains adequate nutritional intake  Description: INTERVENTIONS:  - Monitor percentage of each meal consumed  - Identify factors contributing to decreased intake, treat as appropriate  - Assist with meals as needed  - Monitor I&O, weight, and lab values if indicated  - Obtain nutrition services referral as needed  Outcome: Progressing  Goal: Establish and maintain optimal ostomy function  Description: INTERVENTIONS:  - Assess bowel function  - Encourage oral fluids to ensure adequate hydration  - Administer IV fluids if ordered to ensure adequate hydration   - Administer ordered medications as needed  - Encourage mobilization and activity  - Nutrition services referral to assist patient with appropriate food choices  - Assess stoma site  - Consider wound care consult   Outcome: Progressing  Goal: Oral mucous membranes remain intact  Description: INTERVENTIONS  - Assess oral mucosa and hygiene practices  - Implement preventative oral hygiene regimen  - Implement oral medicated treatments as ordered  - Initiate Nutrition services referral as needed  Outcome: Progressing     Problem: GENITOURINARY - ADULT  Goal: Maintains or returns to baseline urinary function  Description: INTERVENTIONS:  - Assess urinary function  - Encourage oral fluids to ensure adequate hydration if ordered  - Administer IV fluids as ordered to ensure adequate hydration  - Administer ordered medications as needed  - Offer frequent toileting  - Follow urinary retention protocol if ordered  Outcome: Progressing  Goal: Absence of urinary retention  Description: INTERVENTIONS:  - Assess patient’s ability to void and empty bladder  - Monitor I/O  - Bladder scan as needed  - Discuss with physician/AP medications to alleviate retention as needed  - Discuss catheterization for long term situations as appropriate  Outcome: Progressing  Goal: Urinary catheter remains patent  Description: INTERVENTIONS:  - Assess patency of urinary catheter  - If patient has a chronic rivero, consider changing catheter if non-functioning  - Follow guidelines for intermittent irrigation of non-functioning urinary catheter  Outcome: Progressing     Problem: METABOLIC, FLUID AND ELECTROLYTES - ADULT  Goal: Electrolytes maintained within normal limits  Description: INTERVENTIONS:  - Monitor labs and assess patient for signs and symptoms of electrolyte imbalances  - Administer electrolyte replacement as ordered  - Monitor response to electrolyte replacements, including repeat lab results as appropriate  - Instruct patient on fluid and nutrition as appropriate  Outcome: Progressing  Goal: Fluid balance maintained  Description: INTERVENTIONS:  - Monitor labs   - Monitor I/O and WT  - Instruct patient on fluid and nutrition as appropriate  - Assess for signs & symptoms of volume excess or deficit  Outcome: Progressing  Goal: Glucose maintained within target range  Description: INTERVENTIONS:  - Monitor Blood Glucose as ordered  - Assess for signs and symptoms of hyperglycemia and hypoglycemia  - Administer ordered medications to maintain glucose within target range  - Assess nutritional intake and initiate nutrition service referral as needed  Outcome: Progressing

## 2022-12-02 NOTE — PHYSICAL THERAPY NOTE
PHYSICAL THERAPY NOTE          Patient Name: Lillie Kaur  GCWFS'C Date: 12/2/2022 12/02/22 1104   PT Last Visit   PT Visit Date 12/02/22   Note Type   Note Type Treatment   Pain Assessment   Pain Assessment Tool 0-10   Pain Score No Pain  (@ rest, increases with mobility)   Pain Location/Orientation Location: Abdomen   Hospital Pain Intervention(s) Repositioned; Ambulation/increased activity; Emotional support   Restrictions/Precautions   Weight Bearing Precautions Per Order No   Other Precautions Cognitive; Chair Alarm; Bed Alarm;Multiple lines; Fall Risk;Pain  (VICTORINO drain, ileostomy, wound vac)   General   Chart Reviewed Yes   Response to Previous Treatment Patient with no complaints from previous session  Family/Caregiver Present No   Cognition   Overall Cognitive Status Impaired   Arousal/Participation Responsive; Cooperative   Attention Attends with cues to redirect   Orientation Level Oriented X4   Memory Decreased short term memory;Decreased recall of precautions   Following Commands Follows one step commands with increased time or repetition   Comments pt pleasant and cooperative to participate in therapy session  Motivated throughout session  Does express fear of falling- emotional support provided   Bed Mobility   Supine to Sit Unable to assess   Sit to Supine Unable to assess   Additional Comments pt sitting OOB in recliner upon arrival  Pt returned to bedside chair with alarm on and all needs within reach at the end of therapy session   Transfers   Sit to Stand 3  Moderate assistance   Additional items Assist x 2; Increased time required;Verbal cues   Stand to Sit 3  Moderate assistance   Additional items Assist x 2; Increased time required;Verbal cues   Additional Comments transfers with RW; cues for sequencing, foot placement, posture   Pt completes 4x STS throughout therapy session   Ambulation/Elevation   Gait pattern Excessively slow; Short stride; Inconsistent farhan;Decreased foot clearance; Improper Weight shift;Narrow LUIS;Shuffling   Gait Assistance 3  Moderate assist   Additional items Assist x 2;Verbal cues; Tactile cues  (+ close chair follow for increased safety)   Assistive Device Rolling walker   Distance 6ft foward   Balance   Static Sitting Fair   Dynamic Sitting Fair -   Static Standing Poor +   Dynamic Standing Poor   Ambulatory Poor   Endurance Deficit   Endurance Deficit Yes   Endurance Deficit Description generalized fatigue, weakness, deconditioning   Activity Tolerance   Activity Tolerance Patient tolerated treatment well   Medical Staff Made Aware OT; co-session completed this date 2* increased medical complexity and comorbidiites  PT focused on functional transfers, standing tolerance + balance, ambulaiton + therapuetic exercise   Nurse Made Aware RN cleared pt to participate in therapy session   Exercises   Hip Abduction Sitting;Bilateral;10 reps;AROM   Hip Adduction Sitting;Bilateral;10 reps;AROM   Knee AROM Long Arc Quad Sitting;Bilateral;10 reps;AROM  (x2 sets)   Ankle Pumps Sitting;Bilateral;10 reps;AROM  (heel/toe raises x 2 sets)   Marching Sitting;Bilateral;10 reps;AROM  (x2 sets)   Balance training  standing with RW for ~1 min x trials   Assessment   Prognosis Fair   Problem List Decreased strength;Decreased range of motion;Decreased endurance; Impaired balance;Decreased mobility; Decreased coordination;Decreased cognition; Impaired judgement;Decreased safety awareness;Pain;Decreased skin integrity   Assessment Pt seen for PT treatment session this date  Therapy session focused on functional transfers, gait training, therapeutic exercise and endurance training in order to improve overall mobility and independence  Pt requires assist of 2 for all mobility performed this date  Pt performed multiple STS to RW with mod A x2; increased standing tolerance noted   Pt with emotional after initial STS 2* increased progress- very motivated to ambulate + regain strength  Pt able to walk short distance forward with RW and close chair follow- cues for increasing step length + widening LUIS  Pt performed/ tolerated seated therex; told pt to perform 2-3x/day to increase LE strength  Pt making steady progress toward goals  Pt was left sitting OOB in recliner with alarm on at the end of PT session with all needs in reach  Pt would benefit from continued PT services while in hospital to address remaining limitations  PT to continue to follow pt and recommends rehab upon d/c  The patient's AM-PAC Basic Mobility Inpatient Short Form Raw Score is 7  A Raw score of less than or equal to 16 suggests the patient may benefit from discharge to post-acute rehabilitation services  Please also refer to the recommendation of the Physical Therapist for safe discharge planning  Barriers to Discharge Inaccessible home environment;Decreased caregiver support   Goals   Patient Goals to get stronger   STG Expiration Date 12/07/22   PT Treatment Day 4   Plan   Treatment/Interventions ADL retraining;Functional transfer training;LE strengthening/ROM; Patient/family training;Equipment eval/education;Gait training;Spoke to nursing;Spoke to case management;OT; Therapeutic exercise   Progress Progressing toward goals   PT Frequency 2-3x/wk   Recommendation   PT Discharge Recommendation Post acute rehabilitation services   AM-PAC Basic Mobility Inpatient   Turning in Bed Without Bedrails 2   Lying on Back to Sitting on Edge of Flat Bed 1   Moving Bed to Chair 1   Standing Up From Chair 1   Walk in Room 1   Climb 3-5 Stairs 1   Basic Mobility Inpatient Raw Score 7   Highest Level Of Mobility   JH-HLM Goal 2: Bed activities/Dependent transfer   JH-HLM Achieved 5: Stand (1 or more minutes)   Education   Education Provided Mobility training;Assistive device;Home exercise program   Patient Demonstrates acceptance/verbal understanding   End of Consult   Patient Position at End of Consult Bedside chair;Bed/Chair alarm activated; All needs within reach     Carol Amador, PT, DPT

## 2022-12-02 NOTE — QUICK NOTE
Progress Note - Palliative & Supportive Care       12/2/2022 10:26 AM -  Gabrielle Escalantemarco's chart and case were reviewed by Ramos Holland DO  Mode of review included electronic chart check  Per review, symptoms remain controlled on current regimen and no changes are made at this time  Please continue the regimen in place, and review our last note for details  For dispo plan, please review Case Management notes  Palliative care will return on Monday, Dec  5        For urgent issues or any questions/concerns, please notify on-call provider via [address] [place] [address]  You may also call our answering service 24/7 at   Ramos Holland DO  Palliative and Supportive Care  Clinic/Answering Service: [phone]  You can find me on [employer]!

## 2022-12-02 NOTE — PLAN OF CARE
Problem: MOBILITY - ADULT  Goal: Maintain or return to baseline ADL function  Description: INTERVENTIONS:  -  Assess patient's ability to carry out ADLs; assess patient's baseline for ADL function and identify physical deficits which impact ability to perform ADLs (bathing, care of mouth/teeth, toileting, grooming, dressing, etc )  - Assess/evaluate cause of self-care deficits   - Assess range of motion  - Assess patient's mobility; develop plan if impaired  - Assess patient's need for assistive devices and provide as appropriate  - Encourage maximum independence but intervene and supervise when necessary  - Involve family in performance of ADLs  - Assess for home care needs following discharge   - Consider OT consult to assist with ADL evaluation and planning for discharge  - Provide patient education as appropriate  Outcome: Progressing  Goal: Maintains/Returns to pre admission functional level  Description: INTERVENTIONS:  - Perform BMAT or MOVE assessment daily    - Set and communicate daily mobility goal to care team and patient/family/caregiver     - Collaborate with rehabilitation services on mobility goals if consulted  - Out of bed for toileting  - Record patient progress and toleration of activity level   Outcome: Progressing     Problem: Prexisting or High Potential for Compromised Skin Integrity  Goal: Skin integrity is maintained or improved  Description: INTERVENTIONS:  - Identify patients at risk for skin breakdown  - Assess and monitor skin integrity  - Assess and monitor nutrition and hydration status  - Monitor labs   - Assess for incontinence   - Turn and reposition patient  - Assist with mobility/ambulation  - Relieve pressure over bony prominences  - Avoid friction and shearing  - Provide appropriate hygiene as needed including keeping skin clean and dry  - Evaluate need for skin moisturizer/barrier cream  - Collaborate with interdisciplinary team   - Patient/family teaching  - Consider wound care consult   Outcome: Progressing     Problem: Potential for Falls  Goal: Patient will remain free of falls  Description: INTERVENTIONS:  - Educate patient/family on patient safety including physical limitations  - Instruct patient to call for assistance with activity   - Consult OT/PT to assist with strengthening/mobility   - Keep Call bell within reach  - Keep bed low and locked with side rails adjusted as appropriate  - Keep care items and personal belongings within reach  - Initiate and maintain comfort rounds  - Make Fall Risk Sign visible to staff  - Apply yellow socks and bracelet for high fall risk patients  - Consider moving patient to room near nurses station  Outcome: Progressing     Problem: Nutrition/Hydration-ADULT  Goal: Nutrient/Hydration intake appropriate for improving, restoring or maintaining nutritional needs  Description: Monitor and assess patient's nutrition/hydration status for malnutrition  Collaborate with interdisciplinary team and initiate plan and interventions as ordered  Monitor patient's weight and dietary intake as ordered or per policy  Utilize nutrition screening tool and intervene as necessary  Determine patient's food preferences and provide high-protein, high-caloric foods as appropriate       INTERVENTIONS:  - Monitor oral intake, urinary output, labs, and treatment plans  - Assess nutrition and hydration status and recommend course of action  - Evaluate amount of meals eaten  - Assist patient with eating if necessary   - Allow adequate time for meals  - Recommend/ encourage appropriate diets, oral nutritional supplements, and vitamin/mineral supplements  - Order, calculate, and assess calorie counts as needed  - Recommend, monitor, and adjust tube feedings and TPN/PPN based on assessed needs  - Assess need for intravenous fluids  - Provide specific nutrition/hydration education as appropriate  - Include patient/family/caregiver in decisions related to nutrition  Outcome: Progressing     Problem: PAIN - ADULT  Goal: Verbalizes/displays adequate comfort level or baseline comfort level  Description: Interventions:  - Encourage patient to monitor pain and request assistance  - Assess pain using appropriate pain scale  - Administer analgesics based on type and severity of pain and evaluate response  - Implement non-pharmacological measures as appropriate and evaluate response  - Consider cultural and social influences on pain and pain management  - Notify physician/advanced practitioner if interventions unsuccessful or patient reports new pain  Outcome: Progressing     Problem: INFECTION - ADULT  Goal: Absence or prevention of progression during hospitalization  Description: INTERVENTIONS:  - Assess and monitor for signs and symptoms of infection  - Monitor lab/diagnostic results  - Monitor all insertion sites, i e  indwelling lines, tubes, and drains  - Monitor endotracheal if appropriate and nasal secretions for changes in amount and color  - [place] appropriate cooling/warming therapies per order  - Administer medications as ordered  - Instruct and encourage patient and family to use good hand hygiene technique  - Identify and instruct in appropriate isolation precautions for identified infection/condition  Outcome: Progressing  Goal: Absence of fever/infection during neutropenic period  Description: INTERVENTIONS:  - Monitor WBC    Outcome: Progressing     Problem: SAFETY ADULT  Goal: Maintain or return to baseline ADL function  Description: INTERVENTIONS:  -  Assess patient's ability to carry out ADLs; assess patient's baseline for ADL function and identify physical deficits which impact ability to perform ADLs (bathing, care of mouth/teeth, toileting, grooming, dressing, etc )  - Assess/evaluate cause of self-care deficits   - Assess range of motion  - Assess patient's mobility; develop plan if impaired  - Assess patient's need for assistive devices and provide as appropriate  - Encourage maximum independence but intervene and supervise when necessary  - Involve family in performance of ADLs  - Assess for home care needs following discharge   - Consider OT consult to assist with ADL evaluation and planning for discharge  - Provide patient education as appropriate  Outcome: Progressing  Goal: Maintains/Returns to pre admission functional level  Description: INTERVENTIONS:  - Perform BMAT or MOVE assessment daily    - Set and communicate daily mobility goal to care team and patient/family/caregiver     - Collaborate with rehabilitation services on mobility goals if consulted  - Out of bed for toileting  - Record patient progress and toleration of activity level   Outcome: Progressing  Goal: Patient will remain free of falls  Description: INTERVENTIONS:  - Educate patient/family on patient safety including physical limitations  - Instruct patient to call for assistance with activity   - Consult OT/PT to assist with strengthening/mobility   - Keep Call bell within reach  - Keep bed low and locked with side rails adjusted as appropriate  - Keep care items and personal belongings within reach  - Initiate and maintain comfort rounds  - Make Fall Risk Sign visible to staff  - Apply yellow socks and bracelet for high fall risk patients  - Consider moving patient to room near nurses station  Outcome: Progressing     Problem: DISCHARGE PLANNING  Goal: Discharge to home or other facility with appropriate resources  Description: INTERVENTIONS:  - Identify barriers to discharge w/patient and caregiver  - Arrange for needed discharge resources and transportation as appropriate  - Identify discharge learning needs (meds, wound care, etc )  - Arrange for interpretive services to assist at discharge as needed  - Refer to Case Management Department for coordinating discharge planning if the patient needs post-hospital services based on physician/advanced practitioner order or complex needs related to functional status, cognitive ability, or social support system  Outcome: Progressing     Problem: Knowledge Deficit  Goal: Patient/family/caregiver demonstrates understanding of disease process, treatment plan, medications, and discharge instructions  Description: Complete learning assessment and assess knowledge base    Interventions:  - Provide teaching at level of understanding  - Provide teaching via preferred learning methods  Outcome: Progressing     Problem: RESPIRATORY - ADULT  Goal: Achieves optimal ventilation and oxygenation  Description: INTERVENTIONS:  - Assess for changes in respiratory status  - Assess for changes in mentation and behavior  - Position to facilitate oxygenation and minimize respiratory effort  - Oxygen administered by appropriate delivery if ordered  - Initiate smoking cessation education as indicated  - Encourage broncho-pulmonary hygiene including cough, deep breathe, Incentive Spirometry  - Assess the need for suctioning and aspirate as needed  - Assess and instruct to report SOB or any respiratory difficulty  - Respiratory Therapy support as indicated  Outcome: Progressing     Problem: GASTROINTESTINAL - ADULT  Goal: Minimal or absence of nausea and/or vomiting  Description: INTERVENTIONS:  - Administer IV fluids if ordered to ensure adequate hydration  - Maintain NPO status until nausea and vomiting are resolved  - Nasogastric tube if ordered  - Administer ordered antiemetic medications as needed  - Provide nonpharmacologic comfort measures as appropriate  - Advance diet as tolerated, if ordered  - Consider nutrition services referral to assist patient with adequate nutrition and appropriate food choices  Outcome: Progressing  Goal: Maintains or returns to baseline bowel function  Description: INTERVENTIONS:  - Assess bowel function  - Encourage oral fluids to ensure adequate hydration  - Administer IV fluids if ordered to ensure adequate hydration  - Administer ordered medications as needed  - Encourage mobilization and activity  - Consider nutritional services referral to assist patient with adequate nutrition and appropriate food choices  Outcome: Progressing  Goal: Maintains adequate nutritional intake  Description: INTERVENTIONS:  - Monitor percentage of each meal consumed  - Identify factors contributing to decreased intake, treat as appropriate  - Assist with meals as needed  - Monitor I&O, weight, and lab values if indicated  - Obtain nutrition services referral as needed  Outcome: Progressing  Goal: Establish and maintain optimal ostomy function  Description: INTERVENTIONS:  - Assess bowel function  - Encourage oral fluids to ensure adequate hydration  - Administer IV fluids if ordered to ensure adequate hydration   - Administer ordered medications as needed  - Encourage mobilization and activity  - Nutrition services referral to assist patient with appropriate food choices  - Assess stoma site  - Consider wound care consult   Outcome: Progressing  Goal: Oral mucous membranes remain intact  Description: INTERVENTIONS  - Assess oral mucosa and hygiene practices  - Implement preventative oral hygiene regimen  - Implement oral medicated treatments as ordered  - Initiate Nutrition services referral as needed  Outcome: Progressing     Problem: GENITOURINARY - ADULT  Goal: Maintains or returns to baseline urinary function  Description: INTERVENTIONS:  - Assess urinary function  - Encourage oral fluids to ensure adequate hydration if ordered  - Administer IV fluids as ordered to ensure adequate hydration  - Administer ordered medications as needed  - Offer frequent toileting  - Follow urinary retention protocol if ordered  Outcome: Progressing  Goal: Absence of urinary retention  Description: INTERVENTIONS:  - Assess patient’s ability to void and empty bladder  - Monitor I/O  - Bladder scan as needed  - Discuss with physician/AP medications to alleviate retention as needed  - Discuss catheterization for long term situations as appropriate  Outcome: Progressing  Goal: Urinary catheter remains patent  Description: INTERVENTIONS:  - Assess patency of urinary catheter  - If patient has a chronic rivero, consider changing catheter if non-functioning  - Follow guidelines for intermittent irrigation of non-functioning urinary catheter  Outcome: Progressing     Problem: METABOLIC, FLUID AND ELECTROLYTES - ADULT  Goal: Electrolytes maintained within normal limits  Description: INTERVENTIONS:  - Monitor labs and assess patient for signs and symptoms of electrolyte imbalances  - Administer electrolyte replacement as ordered  - Monitor response to electrolyte replacements, including repeat lab results as appropriate  - Instruct patient on fluid and nutrition as appropriate  Outcome: Progressing  Goal: Fluid balance maintained  Description: INTERVENTIONS:  - Monitor labs   - Monitor I/O and WT  - Instruct patient on fluid and nutrition as appropriate  - Assess for signs & symptoms of volume excess or deficit  Outcome: Progressing  Goal: Glucose maintained within target range  Description: INTERVENTIONS:  - Monitor Blood Glucose as ordered  - Assess for signs and symptoms of hyperglycemia and hypoglycemia  - Administer ordered medications to maintain glucose within target range  - Assess nutritional intake and initiate nutrition service referral as needed  Outcome: Progressing

## 2022-12-02 NOTE — PLAN OF CARE
Problem: OCCUPATIONAL THERAPY ADULT  Goal: Performs self-care activities at highest level of function for planned discharge setting  See evaluation for individualized goals  Description: Treatment Interventions: ADL retraining, Functional transfer training, UE strengthening/ROM, Endurance training, Patient/family training, Equipment evaluation/education, Compensatory technique education, Energy conservation, Activityengagement          See flowsheet documentation for full assessment, interventions and recommendations  Outcome: Progressing  Note: Limitation: Decreased ADL status, Decreased UE ROM, Decreased UE strength, Decreased Safe judgement during ADL, Decreased cognition, Decreased endurance, Decreased self-care trans, Decreased high-level ADLs  Prognosis: Fair  Assessment: Patient participated in Skilled OT session this date with interventions consisting of ADL re training with the use of correct body mechnaics, Energy Conservation techniques, safety awareness and fall prevention techniques,  therapeutic activities to: increase activity tolerance, increase dynamic sit/ stand balance during functional activity  and increase OOB/ sitting tolerance   Upon arrival patient was found seated OOB to Chair  Pt demonstrated the following tasks: MOD A x 2 STS and fnxl mobility with RW  Pt is able to doff and don socks with MOD A in tailor sit method  Pt also provided with HEP handout and educated on exercises with use of light resistance theraband  Patient continues to be functioning below baseline level, occupational performance remains limited secondary to factors listed above and increased risk for falls and injury  From OT standpoint, recommendation at time of d/c would be STR  Patient to benefit from continued Occupational Therapy treatment while in the hospital to address deficits as defined above and maximize level of functional independence with ADLs and functional mobility   Pt was left in chair with alarm on after session with all current needs met  The patient's raw score on the AM-PAC Daily Activity inpatient short form is 16, standardized score is 35 96, less than 39 4  Patients at this level are likely to benefit from discharge to post-acute rehabilitation services  Please refer to the recommendation of the Occupational Therapist for safe discharge planning       OT Discharge Recommendation: Post acute rehabilitation services

## 2022-12-02 NOTE — CASE MANAGEMENT
Support Center received request for authorization from Care Manager    Authorization request for: [address]   IMW:[id number]  Facility MD:Dr Ronaldo Simon  TYP:[id number]   Authorization initiated by contacting: Lena Taylor @ [phone]  Pending Reference #:[id number]  Clinicals submitted via: Fax#[phone]

## 2022-12-02 NOTE — NUTRITION
12/02/22 1404   Recommendations/Interventions   Summary Calorie count day 1 results x3 meals: 300 calories (15% of calorie needs) and 30 grams protein (30% of protein needs) met with current po intake  Patient dislikes Glucerna supplements and is requesting to discontinue  Family providing Premier protein shakes (160 calories/30 grams protein each)  Appetite remains poor today  Verbally reinforced importance of increasing intake of nutrient dense food items and protein supplements  Patient lethargic during visit and requesting to rest, just had VAC dressing removed  Interventions/Recommendations Request RD protocol; Adjust diet order;Supplement discontinue;Lab consider order (Specify); Other (Specify); Obtain current weight   Intervention Comments Secondary to continued suboptimal po intake calorie count discontinued  Family providing Premier protein supplement (encouraged patient to consume at least 3 per day)   Recommendations to Provider Suggest liberalize diet to Regular to optimize po intake and discontinue Glucerna supplements per patient request  Monitor electrolytes and weight  Education Assessment   Education Education initiated/ completed   Education Notes Briefly reviewed Ileostomy Nutrition guidelines  Written education materials and outpatient MNT brochure provided

## 2022-12-02 NOTE — PROGRESS NOTES
Progress Note - Celena Herrera 61 y o  male MRN: [id number]    Unit/Bed#: [facility] 901-01 Encounter: [id number]      Assessment/Plan:  Encephalopathy  improved  Present: alert and oriented x3, periods of hallucinations (pt aware of hallucinations)  Appears to be exacerbated by medications, overall improved  Last recorded BM 11/26/22  Decreased pain medications per palliative  Maintain delirium precautions:  Provide frequent redirection, reorientation, distraction techniques  Avoid deliriogenic medications such as tramadol, benzodiazepines, anticholinergics,  Benadryl  Treat pain, See geriatric pain protocol  Monitor for constipation and urinary retention  Encourage early and frequent moblization, OOB  Encourage Hydration/ Nutrition  Implement sleep hygiene, limit night time interuptions, group activities    continue with decreased dose of cymbalta 30 mg po daily, ongoing evaluation, evaluate for continued taper as outpatient/ rehab  Continue seroquel 25 mg po QHS     Insomnia  Related to hospitalization, medication, pain  Pt transferred to MS floor, improved sleep environment  First line is behavioral therapy  Avoid sedative hypnotics such as benzodiazepines and benadryl  Encourage staying awake during the day  Encourage daytime activity, morning exercise  Decrease or eliminate day time naps  Avoid caffiene, alcohol especially during late afternoon and evening hours  Establish a night time routine  Continue seroquel at present, continue to monitor     Acute pain due to surgery  Scheduled acetaminophen 650 mg po Q6  Oxycodone 5 mg po Q 4 prn moderate pain  Oxycodone 10 mg po Q 4 prn severe pain   IV dilaudid 0 3 mg prn breakthrough pain  Monitor for constipation  Lidoderm patch  Medications adjusted per pain/ primary team     Cervical radiculopathy left   Prior to arrival taking robaxin and cymbalta  Reduce cymbalta as above  Robaxin held on admission     Sepsis/ ESBL bacteremia  Completed abx  ID on consult Metastatic colon ca with liver mets  S/p transverse colon resection, vac  Surgery on consult    Subjective:   Upon exam pt is oob in recliner chair  He is alert and oriented x 4  He states he is doing well  He reports feeling foggy and some confusion after having dilaudid earlier today  He states he is eating  Was oob and walked in hallway with PT      Objective:     Vitals: Blood pressure 168/88, pulse 95, temperature 98 1 °F (36 7 °C), resp  rate 17, height 5' 8" (1 727 m), weight 114 kg (251 lb 8 7 oz), SpO2 97 %  ,Body mass index is 38 25 kg/m²  Intake/Output Summary (Last 24 hours) at 12/2/2022 1455  Last data filed at 12/2/2022 1300  Gross per 24 hour   Intake 480 ml   Output 925 ml   Net -445 ml       Physical Exam:   General : NAD  HEENT : MMM   Heart : Normal rate, no murmur rub or gallop  Lungs : CTA no wheezes, rales or rhonchi  Abdomen : Soft, NT/ND, BS auscultated in all 4 quads  Ext :  no edema  Skin : Pink, warm, dry, age appropriate turgor and mobility  Neuro : Nonfocal  Psych : Alert and O x 3, periods of confusion      Invasive Devices     Central Venous Catheter Line  Duration           Port A Cath 03/10/22 Right Chest 267 days          Peripheral Intravenous Line  Duration           Peripheral IV 11/28/22 Right;Ventral (anterior) Forearm 4 days          Drain  Duration           Closed/Suction Drain Left Abdomen Bulb 19 Fr  16 days    Ileostomy LUQ 15 days                Lab, Imaging and other studies: I have personally reviewed pertinent reports      VTE Pharmacologic Prophylaxis: Sequential compression device (Venodyne)   VTE Mechanical Prophylaxis: sequential compression device

## 2022-12-02 NOTE — PROGRESS NOTES
Progress Note - Surgical Oncology  Neeraj Hair 61 y o  male MRN: [id number]  Unit/Bed#: [facility] 901-01 Encounter: [id number]      Objective:  Patient better this morning  Still has periods confusion  Was out of bed to a chair yesterday  Continues to work with PT OT  There is no nausea, no emesis  Ileostomy is functioning well with brown stool  Patient is able to eat approximately 50% of each of his meals  States he still extremely weak  His blood sugars are well controlled, 151, 78, 119, 133  Patient continues on Glucerna with meals  Continues with ileostomy care and teaching  Case management working on placement  650 UA  5 VICTORINO  100 VAC  525 ileostomy    Blood pressure 158/85, pulse 93, temperature 98 8 °F (37 1 °C), resp  rate 22, height 5' 8" (1 727 m), weight 114 kg (251 lb 8 7 oz), SpO2 93 %  ,Body mass index is 38 25 kg/m²        Intake/Output Summary (Last 24 hours) at 12/2/2022 0517  Last data filed at 12/1/2022 2201  Gross per 24 hour   Intake 550 ml   Output 2005 ml   Net -1455 ml       Invasive Devices     Central Venous Catheter Line  Duration           Port A Cath 03/10/22 Right Chest 266 days          Peripheral Intravenous Line  Duration           Peripheral IV 11/28/22 Right;Ventral (anterior) Forearm 4 days          Drain  Duration           Closed/Suction Drain Left Abdomen Bulb 19 Fr  15 days    Ileostomy LUQ 14 days                Physical Exam:   Awake, orientated x3 this morning, appears comfortable in bed  Abdomen:  Soft, obese, midline VAC is intact, right horizontal wound clean and no drainage on dressing, redressed with small amount of edge of open 4 x 4 in wound, minimal abdominal tenderness on exam  Extremities, no pedal edema this morning there is no calf tenderness bilaterally    Lab, Imaging and other studies:  Lab holiday today  VTE Pharmacologic Prophylaxis: Enoxaparin (Lovenox)  VTE Mechanical Prophylaxis: sequential compression device    Assessment:  POD # 25 exploratory laparotomy, resection segment 3 of the liver, transverse colectomy, reversal loop colostomy  POD # 21 of extubation  POD # 16 exploratory laparotomy, right hemicolectomy, left discontinuity, ABThera VAC  POD # 15 exploratory laparotomy partial descending colectomy primary anastomosis, diverting loop ileostomy, midline VAC  POD # 10 of extubation    Plan:  1  Plan is to remove the midline VAC today and use wet to dry dressings  2  To be out of bed and continue to work with PT OT to gain strength  3  Continue ileostomy care and teaching  4  May shower dressing off,  vac off,  in the shower, wet to dry dressing midline wound, loosely pack right abdominal wound, small amount OT  5  Continue with incentive spirometry  6  Continue endocrinology in geriatric recommendations  7   Awaiting placement

## 2022-12-03 ENCOUNTER — TELEPHONE (OUTPATIENT)
Dept: OTHER | Facility: OTHER | Age: 57
End: 2022-12-03

## 2022-12-03 VITALS
WEIGHT: 251.54 LBS | BODY MASS INDEX: 38.12 KG/M2 | HEART RATE: 83 BPM | OXYGEN SATURATION: 92 % | HEIGHT: 68 IN | DIASTOLIC BLOOD PRESSURE: 87 MMHG | TEMPERATURE: 97.3 F | SYSTOLIC BLOOD PRESSURE: 139 MMHG | RESPIRATION RATE: 20 BRPM

## 2022-12-03 LAB
GLUCOSE SERPL-MCNC: 108 MG/DL (ref 65–140)
GLUCOSE SERPL-MCNC: 129 MG/DL (ref 65–140)
GLUCOSE SERPL-MCNC: 96 MG/DL (ref 65–140)

## 2022-12-03 RX ORDER — INSULIN LISPRO 100 [IU]/ML
4 INJECTION, SOLUTION INTRAVENOUS; SUBCUTANEOUS
Status: DISCONTINUED | OUTPATIENT
Start: 2022-12-03 | End: 2022-12-03 | Stop reason: HOSPADM

## 2022-12-03 RX ORDER — ENOXAPARIN SODIUM 100 MG/ML
40 INJECTION SUBCUTANEOUS
Qty: 2 ML | Refills: 0
Start: 2022-12-04 | End: 2022-12-14

## 2022-12-03 RX ORDER — INSULIN LISPRO 100 [IU]/ML
6 INJECTION, SOLUTION INTRAVENOUS; SUBCUTANEOUS
Status: DISCONTINUED | OUTPATIENT
Start: 2022-12-04 | End: 2022-12-03 | Stop reason: HOSPADM

## 2022-12-03 RX ORDER — INSULIN LISPRO 100 [IU]/ML
2 INJECTION, SOLUTION INTRAVENOUS; SUBCUTANEOUS
Status: DISCONTINUED | OUTPATIENT
Start: 2022-12-03 | End: 2022-12-03 | Stop reason: HOSPADM

## 2022-12-03 RX ADMIN — ACETAMINOPHEN 650 MG: 325 TABLET ORAL at 12:46

## 2022-12-03 RX ADMIN — OXYCODONE HYDROCHLORIDE 5 MG: 5 SOLUTION ORAL at 18:12

## 2022-12-03 RX ADMIN — OXYCODONE HYDROCHLORIDE 5 MG: 5 SOLUTION ORAL at 10:47

## 2022-12-03 RX ADMIN — FOLIC ACID 1 MG: 1 TABLET ORAL at 10:40

## 2022-12-03 RX ADMIN — ACETAMINOPHEN 650 MG: 325 TABLET ORAL at 07:02

## 2022-12-03 RX ADMIN — ENOXAPARIN SODIUM 40 MG: 40 INJECTION SUBCUTANEOUS at 10:39

## 2022-12-03 RX ADMIN — DULOXETINE HYDROCHLORIDE 30 MG: 30 CAPSULE, DELAYED RELEASE ORAL at 10:40

## 2022-12-03 RX ADMIN — AMLODIPINE BESYLATE 5 MG: 5 TABLET ORAL at 18:11

## 2022-12-03 RX ADMIN — CHLORHEXIDINE GLUCONATE 15 ML: 1.2 SOLUTION ORAL at 10:39

## 2022-12-03 RX ADMIN — AMLODIPINE BESYLATE 5 MG: 5 TABLET ORAL at 10:40

## 2022-12-03 RX ADMIN — ATORVASTATIN CALCIUM 40 MG: 40 TABLET, FILM COATED ORAL at 18:11

## 2022-12-03 RX ADMIN — PANTOPRAZOLE SODIUM 40 MG: 40 TABLET, DELAYED RELEASE ORAL at 07:02

## 2022-12-03 RX ADMIN — CYANOCOBALAMIN TAB 500 MCG 500 MCG: 500 TAB at 10:40

## 2022-12-03 NOTE — PLAN OF CARE
Problem: MOBILITY - ADULT  Goal: Maintain or return to baseline ADL function  Description: INTERVENTIONS:  -  Assess patient's ability to carry out ADLs; assess patient's baseline for ADL function and identify physical deficits which impact ability to perform ADLs (bathing, care of mouth/teeth, toileting, grooming, dressing, etc )  - Assess/evaluate cause of self-care deficits   - Assess range of motion  - Assess patient's mobility; develop plan if impaired  - Assess patient's need for assistive devices and provide as appropriate  - Encourage maximum independence but intervene and supervise when necessary  - Involve family in performance of ADLs  - Assess for home care needs following discharge   - Consider OT consult to assist with ADL evaluation and planning for discharge  - Provide patient education as appropriate  Outcome: Progressing     Problem: MOBILITY - ADULT  Goal: Maintains/Returns to pre admission functional level  Description: INTERVENTIONS:  - Perform BMAT or MOVE assessment daily    - Set and communicate daily mobility goal to care team and patient/family/caregiver     - Collaborate with rehabilitation services on mobility goals if consulted  - Dangle patient 4 times a day  - Stand patient 3x times a day  - Out of bed to chair 2 times a day   - Out of bed for meals 3  Problem: Prexisting or High Potential for Compromised Skin Integrity  Goal: Skin integrity is maintained or improved  Description: INTERVENTIONS:  - Identify patients at risk for skin breakdown  - Assess and monitor skin integrity  - Assess and monitor nutrition and hydration status  - Monitor labs   - Assess for incontinence   - Turn and reposition patient  - Assist with mobility/ambulation  - Relieve pressure over bony prominences  - Avoid friction and shearing  - Provide appropriate hygiene as needed including keeping skin clean and dry  - Evaluate need for skin moisturizer/barrier cream  - Collaborate with interdisciplinary team   - Patient/family teaching  - Consider wound care consult   Outcome: Progressing     Problem: SAFETY ADULT  Goal: Maintain or return to baseline ADL function  Description: INTERVENTIONS:  -  Assess patient's ability to carry out ADLs; assess patient's baseline for ADL function and identify physical deficits which impact ability to perform ADLs (bathing, care of mouth/teeth, toileting, grooming, dressing, etc )  - Assess/evaluate cause of self-care deficits   - Assess range of motion  - Assess patient's mobility; develop plan if impaired  - Assess patient's need for assistive devices and provide as appropriate  - Encourage maximum independence but intervene and supervise when necessary  - Involve family in performance of ADLs  - Assess for home care needs following discharge   - Consider OT consult to assist with ADL evaluation and planning for discharge  - Provide patient education as appropriate  Outcome: Progressing    times a day  - Out of bed for toileting  - Record patient progress and toleration of activity level   Outcome: Progressing

## 2022-12-03 NOTE — CASE MANAGEMENT
Case Management Discharge Planning Note    Patient name Moiz Maryan  Location [facility] 901/[facility] 901-01 MRN [id number]  : 1965 Date 12/3/2022       Current Admission Date: 2022  Current Admission Diagnosis:Colon cancer metastasized to liver [facility])   Patient Active Problem List    Diagnosis Date Noted   • Flash pulmonary edema ([address] ) 2022   • MR (mitral regurgitation) 2022   • Bacteremia 2022   • ESBL (extended spectrum beta-lactamase) producing bacteria infection 2022   • Acute respiratory failure with hypoxia ([facility] ) 2022   • Encephalopathy 2022   • Cervical radiculopathy 10/26/2022   • Other fatigue 06/15/2022   • Colostomy prolapse ([facility] [address] ) 2022   • Colon cancer metastasized to liver ([facility] [address] ) 2022   • 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 liver ([facility] [address] ) 2022   • Iron deficiency anemia, unspecified 2022   • Malignant neoplasm of transverse colon ([facility] [address] ) 2022   • Thrombocytosis 2022   • Hypokalemia 2022   • Transaminitis 2022   • Type 2 diabetes mellitus with hyperlipidemia ([facility] [address] [address] ) 2022   • Colonic mass 2022   • Microcytic anemia 2022   • Left ureteral calculus 2020   • Incarcerated umbilical hernia    • Testicular hypogonadism 2017   • Low testosterone 2017   • Type 2 diabetes mellitus without complication, with long-term current use of insulin ([facility] [address] ) 2016   • Benign essential hypertension 2016   • Mixed hyperlipidemia 2016   • Erectile dysfunction 2016   • Obesity (BMI 30-39 9) 2016      LOS (days): 26  Geometric Mean LOS (GMLOS) (days): 9 80  Days to GMLOS:-16 1     OBJECTIVE:  Risk of Unplanned Readmission Score: 31 52         Current admission status: Inpatient   Preferred Pharmacy:   [id number] [id number]  Phone: [phone] Fax: [address]  [address]  [facility] 3  [address] [place] [id number]  Phone: [phone] Fax: [phone]    [id number] [place] - [address] [facility] [place] [address]  Phone: [phone] Fax: [phone]    Primary Care Provider: Maira Donohue MD    Primary Insurance: [employer]  Secondary Insurance:     DISCHARGE DETAILS:    Discharge planning discussed with[de-identified] Maxcine Pollen                      Contacts  Patient Contacts: Jennipablo Chino (spouse)  Relationship to Patient[de-identified] Family  Contact Method: Phone  Phone Number: [phone]  Reason/Outcome: Continuity of Care, Emergency Contact, Referral, Discharge Planning              Other Referral/Resources/Interventions Provided:  Referral Comments: [facility] Freddie Dosss bossman, # [id number], 9 days, 12/3-12/11 updateon 12/12 - provided to facility via [address]  Transport requesting via [employer], confirmed [address] at Discharge : [facility]        Transported by [employer] and Unit #): Leslye  ETA of Transport (Date): 12/03/22  ETA of Transport (Time): 1900                       Additional Comments: CM made outreach to pt spouse Vika, notified of transport time - no additional concerns or questions at this time  RN and Provider notified

## 2022-12-03 NOTE — PROGRESS NOTES
Progress Note - Lauryn Young 61 y o  male MRN: [id number]    Unit/Bed#: [facility] 901-01 Encounter: [id number]    CC: diabetes f/u    Subjective:   Lauryn Young is a 61y o  year old male with type 2 DM who has undergone transverse colon resection  He reports he has been eating normally for the last two days  He does have juice with breakfast  He does report symptoms of depression while hospitalized  Objective:     Vitals: Blood pressure 158/86, pulse 95, temperature (!) 97 3 °F (36 3 °C), resp  rate 17, height 5' 8" (1 727 m), weight 114 kg (251 lb 8 7 oz), SpO2 94 %  ,Body mass index is 38 25 kg/m²  Intake/Output Summary (Last 24 hours) at 12/3/2022 1551  Last data filed at 12/3/2022 1043  Gross per 24 hour   Intake 100 ml   Output 900 ml   Net -800 ml       Physical Exam:  General Appearance: awake, appears stated age and cooperative  Head: Normocephalic, without obvious abnormality, atraumatic  Extremities: moves all extremities  Skin: Skin color and temperature normal    Pulm: no labored breathing    Lab, Imaging and other studies: I have personally reviewed pertinent reports  POC Glucose (mg/dl)   Date Value   12/03/2022 96   12/03/2022 108   12/02/2022 124   12/02/2022 77   12/02/2022 159 (H)   12/02/2022 127   12/01/2022 151 (H)   12/01/2022 76   12/01/2022 119   12/01/2022 133       Assessment and plan:  Type 2 diabetes  Hemoglobin A1c 8 0 September 2022  Home regimen:  Januvia 100 mg, metformin-glyburide 5-500 mg daily, jorge Galloway  Inpatient regimen:  Lantus 22 units at bedtime, Humalog 6 with meals  Reduce mealtime insulin to 4 units with lunch and 2 units with dinner, continue 6 units Humalog with breakfast   Continue 22 units Lantus at bedtime  Continue to monitor blood glucoses and make adjustments as needed over time  Portions of the record may have been created with voice recognition software

## 2022-12-03 NOTE — PROGRESS NOTES
Progress Note - Surgical Oncology  Gene Lynn 61 y o  male MRN: [id number]  Unit/Bed#: [facility] 901-01 Encounter: [id number]      Assessment:  62yo M w/ metastatic colon cancer s/p transverse colectomy and partial hepatectomy on 03/1/71 complicated by an anastomotic leak requiring right hemicolectomy and loop ileostomy  Afebrile, vitals WNL on room air  UOP:poorly recorded, multiple voids   L 19Fr VICTORINO: 20cc/24hrs  Ileostomy: 550cc/24hrs    Plan:  - Regular diet + nutritional supplements  - Aggressive pulmonary toilet  - Continue packing to abdominal wound w/ wet-to-dry dressings  - Routine ileostomy care and teachings  - Plan for discharge today to [facility], will discuss VICTORINO drain removal w/ surgical attending  -Discharge on Lovenox     Subjective/Objective     Subjective:   No acute events overnight  This morning the patient reported only minimal intermittent abdominal pain adequately controlled on his current analgesic regimen w/ no fevers/chills, nausea/emesis, or dyspnea/chest pain  He is passing flatus and bowel movements via ostomy, tolerating his diet without issue, using his incentive spirometer, and ambulating  Objective:     Blood pressure 153/84, pulse 85, temperature (!) 97 2 °F (36 2 °C), resp  rate 19, height 5' 8" (1 727 m), weight 114 kg (251 lb 8 7 oz), SpO2 94 %  ,Body mass index is 38 25 kg/m²  Gen: Awake, alert, and in no acute distress  Head: Normocephalic, atraumatic  Neck: No obvious JVD, trachea midline  Cardiovascular: Regular rate  Respiratory:  In no acute respiratory distress w/ no use of accessory muscles of respiration  Abd: Soft, no signs of peritonitis; ileostomy pink/patent and productive of non-bloody stool; midline incision w/ dressings in place  Extremities: No visible wounds/ulcerations to the B/L upper/lower extremities  Skin: Warm/dry  Psychiatric: Appropriate mood/affect    Intake/Output Summary (Last 24 hours) at 12/3/2022 0103  Last data filed at 12/2/2022 1300  Gross per 24 hour   Intake 480 ml   Output 150 ml   Net 330 ml       Invasive Devices     Central Venous Catheter Line  Duration           Port A Cath 03/10/22 Right Chest 267 days          Peripheral Intravenous Line  Duration           Peripheral IV 11/28/22 Right;Ventral (anterior) Forearm 4 days          Drain  Duration           Closed/Suction Drain Left Abdomen Bulb 19 Fr  16 days    Ileostomy LUQ 15 days                I have personally reviewed pertinent lab results    , CBC: No results found for: WBC, HGB, HCT, MCV, PLT, ADJUSTEDWBC, MCH, MCHC, RDW, MPV, NRBC, CMP: No results found for: SODIUM, K, CL, CO2, ANIONGAP, BUN, CREATININE, GLUCOSE, CALCIUM, AST, ALT, ALKPHOS, PROT, BILITOT, EGFR, Coagulation: No results found for: PT, INR, APTT, Urinalysis: No results found for: COLORU, CLARITYU, SPECGRAV, PHUR, LEUKOCYTESUR, NITRITE, PROTEINUA, GLUCOSEU, KETONESU, BILIRUBINUR, BLOODU, Amylase: No results found for: AMYLASE, Lipase: No results found for: LIPASE

## 2022-12-03 NOTE — DISCHARGE SUMMARY
Discharge Summary - Surgical Oncology   Demetrio Zamora 61 y o  male MRN: [id number]  Unit/Bed#: [facility] 901-01 Encounter: [id number]    Admission Date:   Admission Orders (From admission, onward)     Ordered        11/07/22 1127  Inpatient Admission  Once                        Admitting Diagnosis: Malignant neoplasm of transverse colon ([address] ) [C18 4]    HPI: as per Rose Priest on 11/7/2022: " Patient returns in follow-up  Regina Tuan MRI from September 13, 2022 revealed multiple hepatic metastasis  Erasmo Cheek were smaller lesions in the left lobe with the largest lesions on the right lobe   I personally reviewed his films   Liver volumes were then calculated based on his previous CT from August 2023   Left Lobe and Caudate Lobe: 498 cm3  Right Lobe: 7807 cm3  Total Liver Volume: 2014 cm3   Patient is currently well   Follow-up colonoscopy revealed a mass in mid transverse colon involving the entire circumference "    Procedures Performed:   Orders Placed This Encounter   Procedures   • Intubation   • Wound negative pressure wound therapy   • Wound vacum dressing application             Hospital Course: The patient was admitted to the hospital following exploratory laparotomy, resection of hepatic segment 3, resection of transverse colon with reversal of loop colostomy  He had a prolonged ICU course with initial extubation on 11/11, postoperative day 4  On 11/16 he returned to the operating room was taken for right hemicolectomy left in discontinuity and a temporary abdominal closure device was placed  The following day, he was re-explored, partial descending colectomy was performed, primary colocolonic anastomosis was created with a diverting loop ileostomy and a midline wound VAC was placed  He then remained in the ICU and was extubated on 11/22  For details of ICU admission please see daily progress notes    Following downgrade from the ICU, the patient was maintained in the hospital with gradual advancement of diet, maintenance of his drains, routine  Stoma care, and pain control  He worked with Physical and Occupational therapy and provisions were made to plan for disposition to rehab  Over the course of his admission he continued to make progress though his mental status occasionally waxed and waned  He completed a prolonged course of antibiotics, was maintained on DVT prophylaxis, and glycemic control was achieved with insulin  On 12/ 1, the patient was tolerating diet, managing to ambulate out of bed, and provisions were made for disposition  On 12/03 his authorization was approved for transfer to rehab facility and provisions were made for his transport  Prior to departure from the hospital, the patient was ensured to have arrangements made for surgical follow-up, medication reconciliation, and wound/ stoma care  For  Additional minor/pertinent details of this hospitalization, please refer to daily progress notes as indicated    Significant Findings, Care, Treatment and Services Provided:    11/7 exploratory laparotomy, hepatic segment 3 resection, transverse colectomy,  Reversal of loop colostomy  11/16 exploratory laparotomy, right hemicolectomy, temporary abdominal closure  11/17 exploration, partial left colectomy, primary ileocolonic anastomosis with proximal diverting loop ileostomy, midline fascial closure    Lab Results: I have personally reviewed pertinent lab results  , CBC: No results found for: WBC, HGB, HCT, MCV, PLT, ADJUSTEDWBC, MCH, MCHC, RDW, MPV, NRBC, CMP: No results found for: SODIUM, K, CL, CO2, ANIONGAP, BUN, CREATININE, GLUCOSE, CALCIUM, AST, ALT, ALKPHOS, PROT, BILITOT, EGFR, Magnesium: No components found for: MAG, Phosphorous: No results found for: PHOS, CEA: No results found for: CEA, CA 19-9: No results found for: , AFP: No results found for: AFP    Complications:  Intra-abdominal hematoma, anastomotic leak, abscess    Discharge Diagnosis:  Metastatic colon adenocarcinoma    Medical Problems     Resolved Problems  Date Reviewed: 11/14/2022   None         Condition at Discharge: fair         Discharge instructions/Information to patient and family:   See after visit summary for information provided to patient and family  Provisions for Follow-Up Care:  See after visit summary for information related to follow-up care and any pertinent home health orders  Disposition: Skilled nursing facility at [facility] Readmission: No    Discharge Statement   I spent 29 minutes discharging the patient  This time was spent on the day of discharge  I had direct contact with the patient on the day of discharge  Additional documentation is required if more than 30 minutes were spent on discharge  Discharge Medications:  See after visit summary for reconciled discharge medications provided to patient and family

## 2022-12-03 NOTE — PROGRESS NOTES
BG 96  RN reached out to [facility] surgery resident, Dr Carl Ye, to see of 4 units Novolog should be held  Stated to hold at this time  Will continue to monitor

## 2022-12-04 ENCOUNTER — HOSPITAL ENCOUNTER (INPATIENT)
Facility: HOSPITAL | Age: 57
LOS: 7 days | End: 2022-12-14
Attending: SURGERY | Admitting: SURGERY

## 2022-12-04 DIAGNOSIS — E11.69 TYPE 2 DIABETES MELLITUS WITH HYPERLIPIDEMIA (HCC): ICD-10-CM

## 2022-12-04 DIAGNOSIS — C79.9 METASTASIS FROM MALIGNANT NEOPLASM OF LIVER (HCC): ICD-10-CM

## 2022-12-04 DIAGNOSIS — J81.0 FLASH PULMONARY EDEMA (HCC): ICD-10-CM

## 2022-12-04 DIAGNOSIS — T81.43XA POSTPROCEDURAL INTRAABDOMINAL ABSCESS: ICD-10-CM

## 2022-12-04 DIAGNOSIS — N52.9 ERECTILE DYSFUNCTION, UNSPECIFIED ERECTILE DYSFUNCTION TYPE: ICD-10-CM

## 2022-12-04 DIAGNOSIS — E78.5 TYPE 2 DIABETES MELLITUS WITH HYPERLIPIDEMIA (HCC): ICD-10-CM

## 2022-12-04 DIAGNOSIS — K94.09 COLOSTOMY PROLAPSE (HCC): ICD-10-CM

## 2022-12-04 DIAGNOSIS — Z79.4 TYPE 2 DIABETES MELLITUS WITHOUT COMPLICATION, WITH LONG-TERM CURRENT USE OF INSULIN (HCC): Primary | ICD-10-CM

## 2022-12-04 DIAGNOSIS — K81.9 CHOLECYSTITIS: ICD-10-CM

## 2022-12-04 DIAGNOSIS — E11.9 TYPE 2 DIABETES MELLITUS WITHOUT COMPLICATION, WITH LONG-TERM CURRENT USE OF INSULIN (HCC): Primary | ICD-10-CM

## 2022-12-04 DIAGNOSIS — C18.9 COLON CANCER METASTASIZED TO LIVER (HCC): ICD-10-CM

## 2022-12-04 DIAGNOSIS — J96.01 ACUTE RESPIRATORY FAILURE WITH HYPOXIA (HCC): ICD-10-CM

## 2022-12-04 DIAGNOSIS — R78.81 BACTEREMIA: ICD-10-CM

## 2022-12-04 DIAGNOSIS — C78.7 COLON CANCER METASTASIZED TO LIVER (HCC): ICD-10-CM

## 2022-12-04 DIAGNOSIS — N17.9 ACUTE KIDNEY INJURY (HCC): ICD-10-CM

## 2022-12-04 DIAGNOSIS — K63.89 COLONIC MASS: ICD-10-CM

## 2022-12-04 DIAGNOSIS — E78.2 MIXED HYPERLIPIDEMIA: ICD-10-CM

## 2022-12-04 DIAGNOSIS — I10 BENIGN ESSENTIAL HYPERTENSION: ICD-10-CM

## 2022-12-04 DIAGNOSIS — C22.8 METASTASIS FROM MALIGNANT NEOPLASM OF LIVER (HCC): ICD-10-CM

## 2022-12-04 LAB
ANION GAP SERPL CALCULATED.3IONS-SCNC: 7 MMOL/L (ref 4–13)
BASOPHILS # BLD AUTO: 0.04 THOUSANDS/ÂΜL (ref 0–0.1)
BASOPHILS NFR BLD AUTO: 0 % (ref 0–1)
BUN SERPL-MCNC: 17 MG/DL (ref 5–25)
CALCIUM SERPL-MCNC: 8.1 MG/DL (ref 8.3–10.1)
CHLORIDE SERPL-SCNC: 112 MMOL/L (ref 96–108)
CO2 SERPL-SCNC: 19 MMOL/L (ref 21–32)
CREAT SERPL-MCNC: 1.16 MG/DL (ref 0.6–1.3)
EOSINOPHIL # BLD AUTO: 0.05 THOUSAND/ÂΜL (ref 0–0.61)
EOSINOPHIL NFR BLD AUTO: 0 % (ref 0–6)
ERYTHROCYTE [DISTWIDTH] IN BLOOD BY AUTOMATED COUNT: 16 % (ref 11.6–15.1)
GFR SERPL CREATININE-BSD FRML MDRD: 69 ML/MIN/1.73SQ M
GLUCOSE SERPL-MCNC: 159 MG/DL (ref 65–140)
GLUCOSE SERPL-MCNC: 160 MG/DL (ref 65–140)
GLUCOSE SERPL-MCNC: 169 MG/DL (ref 65–140)
GLUCOSE SERPL-MCNC: 171 MG/DL (ref 65–140)
GLUCOSE SERPL-MCNC: 182 MG/DL (ref 65–140)
GLUCOSE SERPL-MCNC: 209 MG/DL (ref 65–140)
GLUCOSE SERPL-MCNC: 213 MG/DL (ref 65–140)
GLUCOSE SERPL-MCNC: 217 MG/DL (ref 65–140)
HCT VFR BLD AUTO: 26.8 % (ref 36.5–49.3)
HGB BLD-MCNC: 8.3 G/DL (ref 12–17)
IMM GRANULOCYTES # BLD AUTO: 0.16 THOUSAND/UL (ref 0–0.2)
IMM GRANULOCYTES NFR BLD AUTO: 1 % (ref 0–2)
LYMPHOCYTES # BLD AUTO: 1 THOUSANDS/ÂΜL (ref 0.6–4.47)
LYMPHOCYTES NFR BLD AUTO: 6 % (ref 14–44)
MCH RBC QN AUTO: 27.8 PG (ref 26.8–34.3)
MCHC RBC AUTO-ENTMCNC: 31 G/DL (ref 31.4–37.4)
MCV RBC AUTO: 90 FL (ref 82–98)
MONOCYTES # BLD AUTO: 1.92 THOUSAND/ÂΜL (ref 0.17–1.22)
MONOCYTES NFR BLD AUTO: 11 % (ref 4–12)
NEUTROPHILS # BLD AUTO: 14.34 THOUSANDS/ÂΜL (ref 1.85–7.62)
NEUTS SEG NFR BLD AUTO: 82 % (ref 43–75)
NRBC BLD AUTO-RTO: 0 /100 WBCS
PLATELET # BLD AUTO: 428 THOUSANDS/UL (ref 149–390)
PMV BLD AUTO: 10.3 FL (ref 8.9–12.7)
POTASSIUM SERPL-SCNC: 4.5 MMOL/L (ref 3.5–5.3)
RBC # BLD AUTO: 2.99 MILLION/UL (ref 3.88–5.62)
SODIUM SERPL-SCNC: 138 MMOL/L (ref 135–147)
WBC # BLD AUTO: 17.51 THOUSAND/UL (ref 4.31–10.16)

## 2022-12-04 RX ORDER — OMEGA-3S/DHA/EPA/FISH OIL/D3 300MG-1000
400 CAPSULE ORAL DAILY
Status: DISCONTINUED | OUTPATIENT
Start: 2022-12-04 | End: 2022-12-14 | Stop reason: HOSPADM

## 2022-12-04 RX ORDER — METHOCARBAMOL 500 MG/1
500 TABLET, FILM COATED ORAL 2 TIMES DAILY
Status: DISCONTINUED | OUTPATIENT
Start: 2022-12-04 | End: 2022-12-14 | Stop reason: HOSPADM

## 2022-12-04 RX ORDER — LISINOPRIL 20 MG/1
40 TABLET ORAL DAILY
Status: DISCONTINUED | OUTPATIENT
Start: 2022-12-04 | End: 2022-12-09

## 2022-12-04 RX ORDER — PANTOPRAZOLE SODIUM 40 MG/1
40 TABLET, DELAYED RELEASE ORAL
Status: DISCONTINUED | OUTPATIENT
Start: 2022-12-04 | End: 2022-12-14 | Stop reason: HOSPADM

## 2022-12-04 RX ORDER — ASPIRIN 81 MG/1
81 TABLET, CHEWABLE ORAL DAILY
Status: DISCONTINUED | OUTPATIENT
Start: 2022-12-04 | End: 2022-12-14 | Stop reason: HOSPADM

## 2022-12-04 RX ORDER — ONDANSETRON 4 MG/1
4 TABLET, ORALLY DISINTEGRATING ORAL EVERY 6 HOURS PRN
Status: DISCONTINUED | OUTPATIENT
Start: 2022-12-04 | End: 2022-12-14 | Stop reason: HOSPADM

## 2022-12-04 RX ORDER — OXYCODONE HYDROCHLORIDE 5 MG/1
5 TABLET ORAL EVERY 6 HOURS PRN
Status: DISCONTINUED | OUTPATIENT
Start: 2022-12-04 | End: 2022-12-14 | Stop reason: HOSPADM

## 2022-12-04 RX ORDER — ENOXAPARIN SODIUM 100 MG/ML
40 INJECTION SUBCUTANEOUS 2 TIMES DAILY
Status: DISCONTINUED | OUTPATIENT
Start: 2022-12-04 | End: 2022-12-09

## 2022-12-04 RX ORDER — AMLODIPINE BESYLATE 5 MG/1
5 TABLET ORAL 2 TIMES DAILY
Status: DISCONTINUED | OUTPATIENT
Start: 2022-12-04 | End: 2022-12-14 | Stop reason: HOSPADM

## 2022-12-04 RX ORDER — ATORVASTATIN CALCIUM 40 MG/1
40 TABLET, FILM COATED ORAL DAILY
Status: DISCONTINUED | OUTPATIENT
Start: 2022-12-04 | End: 2022-12-14 | Stop reason: HOSPADM

## 2022-12-04 RX ORDER — OXYCODONE HYDROCHLORIDE 10 MG/1
10 TABLET ORAL EVERY 6 HOURS PRN
Status: DISCONTINUED | OUTPATIENT
Start: 2022-12-04 | End: 2022-12-14 | Stop reason: HOSPADM

## 2022-12-04 RX ORDER — INSULIN LISPRO 100 [IU]/ML
2-12 INJECTION, SOLUTION INTRAVENOUS; SUBCUTANEOUS
Status: DISCONTINUED | OUTPATIENT
Start: 2022-12-04 | End: 2022-12-04

## 2022-12-04 RX ORDER — INSULIN GLARGINE 100 [IU]/ML
15 INJECTION, SOLUTION SUBCUTANEOUS
Status: DISCONTINUED | OUTPATIENT
Start: 2022-12-04 | End: 2022-12-14

## 2022-12-04 RX ORDER — ACETAMINOPHEN 325 MG/1
975 TABLET ORAL EVERY 8 HOURS SCHEDULED
Status: DISCONTINUED | OUTPATIENT
Start: 2022-12-04 | End: 2022-12-14 | Stop reason: HOSPADM

## 2022-12-04 RX ORDER — INSULIN LISPRO 100 [IU]/ML
2-12 INJECTION, SOLUTION INTRAVENOUS; SUBCUTANEOUS
Status: DISCONTINUED | OUTPATIENT
Start: 2022-12-04 | End: 2022-12-05

## 2022-12-04 RX ADMIN — ATORVASTATIN CALCIUM 40 MG: 40 TABLET, FILM COATED ORAL at 09:17

## 2022-12-04 RX ADMIN — PANTOPRAZOLE SODIUM 40 MG: 40 TABLET, DELAYED RELEASE ORAL at 09:17

## 2022-12-04 RX ADMIN — AMLODIPINE BESYLATE 5 MG: 5 TABLET ORAL at 09:17

## 2022-12-04 RX ADMIN — INSULIN LISPRO 2 UNITS: 100 INJECTION, SOLUTION INTRAVENOUS; SUBCUTANEOUS at 09:17

## 2022-12-04 RX ADMIN — METHOCARBAMOL TABLETS 500 MG: 500 TABLET, COATED ORAL at 17:48

## 2022-12-04 RX ADMIN — CHOLECALCIFEROL TAB 10 MCG (400 UNIT) 400 UNITS: 10 TAB at 09:17

## 2022-12-04 RX ADMIN — LISINOPRIL 40 MG: 20 TABLET ORAL at 09:17

## 2022-12-04 RX ADMIN — INSULIN GLARGINE 15 UNITS: 100 INJECTION, SOLUTION SUBCUTANEOUS at 00:50

## 2022-12-04 RX ADMIN — ACETAMINOPHEN 975 MG: 325 TABLET ORAL at 00:50

## 2022-12-04 RX ADMIN — INSULIN GLARGINE 15 UNITS: 100 INJECTION, SOLUTION SUBCUTANEOUS at 22:01

## 2022-12-04 RX ADMIN — INSULIN LISPRO 4 UNITS: 100 INJECTION, SOLUTION INTRAVENOUS; SUBCUTANEOUS at 17:49

## 2022-12-04 RX ADMIN — METHOCARBAMOL TABLETS 500 MG: 500 TABLET, COATED ORAL at 09:17

## 2022-12-04 RX ADMIN — OXYCODONE HYDROCHLORIDE 10 MG: 10 TABLET ORAL at 22:01

## 2022-12-04 RX ADMIN — OXYCODONE HYDROCHLORIDE 10 MG: 10 TABLET ORAL at 00:50

## 2022-12-04 RX ADMIN — Medication 1 TABLET: at 09:17

## 2022-12-04 RX ADMIN — ENOXAPARIN SODIUM 40 MG: 40 INJECTION SUBCUTANEOUS at 17:49

## 2022-12-04 RX ADMIN — ASPIRIN 81 MG CHEWABLE TABLET 81 MG: 81 TABLET CHEWABLE at 09:17

## 2022-12-04 RX ADMIN — ACETAMINOPHEN 975 MG: 325 TABLET ORAL at 09:16

## 2022-12-04 RX ADMIN — PANTOPRAZOLE SODIUM 40 MG: 40 TABLET, DELAYED RELEASE ORAL at 17:49

## 2022-12-04 RX ADMIN — AMLODIPINE BESYLATE 5 MG: 5 TABLET ORAL at 17:53

## 2022-12-04 RX ADMIN — ENOXAPARIN SODIUM 40 MG: 40 INJECTION SUBCUTANEOUS at 09:17

## 2022-12-04 RX ADMIN — ACETAMINOPHEN 975 MG: 325 TABLET ORAL at 22:01

## 2022-12-04 NOTE — H&P
H&P Exam - Surgical Oncology  Dwayne Hodges 61 y o  male MRN: [id number]  Unit/Bed#: [facility] 906-01 Encounter: [id number]    Assessment/Plan     Assessment:  61 M with metastatic colonic adenocarcinoma, recently discharged from prolonged surgical admission culminating in partial colectomy with diverting ileostomy c/b intra-abdominal abscess  Now returning from SNF due to concerns for ability to manage patient  AVSS, room air  Abdomen soft, appropriately tender, viable stoma with well-fitted bag appliance, Midline wound with wet to dry dressing  No new labs or diagnostic images at present   Plan:  -observation  diabetic diet as tolerated  -glycemic control  -OOB as able, ambulate  -Pain control   - DVt PPx, Lovenox  -CM engaged, re-work disposition plan for accepting facility ASAP  -ongoing sotma care, midline daily wet to dry dressings  -pulmonary toilet  -disposition planning     History of Present Illness   History, ROS and PFSH unobtainable from any source due to n/a  HPI:  Dwayne Hodges is a 61 y o  male who presents with abdominal pain  He was discharged on 12/3 following protracted surgical hospitalization to [facility]) and turned away due to concerns about need for pain medication and management needs  Currently he offers no complaints except intermittent abdominal pain  He is anxious  He has been tolerating diet with consistent stoma function  He denies fevers, chills, fainting, nausea, or vomiting  Denies chest pain and shortness of breath  Review of Systems   Constitutional: Positive for activity change  Gastrointestinal: Positive for abdominal pain  Negative for abdominal distention, blood in stool and nausea  Musculoskeletal: Positive for arthralgias and gait problem  All other systems reviewed and are negative        Historical Information   Past Medical History:   Diagnosis Date   • Abdominal pain 03/12/2022   • Acute renal failure ([facility] [address] )     [id number] resolved   • Cancer [facility])    • Diabetes mellitus ([place] [address] )    • Enteritis 08/23/2016   • Gastroparesis due to DM ([place] [address] ) 08/23/2016   • GERD (gastroesophageal reflux disease)    • Hernia, ventral 08/04/2016   • Hyperlipidemia    • Hypertension    • Morbid obesity ([place] [address] ) 04/17/2018   • Postoperative visit 03/02/2022   • SIRS (systemic inflammatory response syndrome) ([place] [address] ) 03/12/2022   • Snoring    • Stage 3a chronic kidney disease ([place] [address] ) 02/19/2022     Past Surgical History:   Procedure Laterality Date   • COLONOSCOPY     • COLOSTOMY N/A 02/20/2022    Procedure: COLOSTOMY LOOP, diverting;  Surgeon: John Owens MD;  Location: [facility];  Service: General   • ESOPHAGOGASTRODUODENOSCOPY N/A 08/24/2016    Procedure: ESOPHAGOGASTRODUODENOSCOPY (EGD); Surgeon: Rody Fountain MD;  Location: [facility];   Service:    • EXPLORATORY LAPAROTOMY W/ BOWEL RESECTION N/A 11/16/2022    Procedure: LAPAROTOMY EXPLORATORY W/ BOWEL RESECTION- VAC PLACEMENT;  Surgeon: Tai Phelan MD;  Location: [facility];  Service: Surgical Oncology   • EXPLORATORY LAPAROTOMY W/ BOWEL RESECTION N/A 11/17/2022    Procedure: LAPAROTOMY EXPLORATORY W/ BOWEL RESECTION;  Surgeon: Tai Phelan MD;  [facility]: [facility];  Service: Surgical Oncology   • ILEOSTOMY N/A 11/17/2022    Procedure: ILEOSTOMY;  Surgeon: Tai Phelan MD;  [facility];  Service: Surgical Oncology   • ILEOSTOMY CLOSURE N/A 11/7/2022    Procedure: REVERSAL COLOSTOMY;  Surgeon: Juan Antonio Davis MD;  [facility];  Service: Surgical Oncology   • IR PORT PLACEMENT  03/10/2022   • KIDNEY STONE SURGERY     • LIVER BIOPSY LAPAROSCOPIC N/A 02/20/2022    Procedure: DIAGNOSTIC LAPAROSCOPIC LIVER BIOPSY, DIVERTING LOOP COLOSTOMY ;  Surgeon: John Owens MD;  [facility];  Service: General   • LIVER LOBECTOMY N/A 11/7/2022    Procedure: SEGMENT 3 LIVER RESECTION, ABLATION, INTRAOPERATIVE U/S OF LIVER, PERITONEAL BIOPSY;  Surgeon: Juan Antonio Davis MD;  Location: [facility]; Service: Surgical Oncology   • RIGHT COLON RESECTION N/A 11/7/2022    Procedure: EX LAP, TRANVERSE COLON RESECTION;  Surgeon: Nia Almaraz MD;  [facility];  Service: Surgical Oncology   • TONSILLECTOMY     • UMBILICAL HERNIA REPAIR LAPAROSCOPIC N/A 04/26/2019    Procedure: LAPAROSCOPIC UMBILICAL HERNIA REPAIR;  Surgeon: Henrik Morse MD;  [facility]: [facility];  Service: General   • VAC DRESSING APPLICATION N/A 85/91/3288    Procedure: APPLICATION VAC DRESSING ABDOMEN/TRUNK;  Surgeon: Rishi Mendez MD;  [facility];  Service: Surgical Oncology     Social History   Social History     Substance and Sexual Activity   Alcohol Use Never     Social History     Substance and Sexual Activity   Drug Use No     Social History     Tobacco Use   Smoking Status Never   Smokeless Tobacco Never     E-Cigarette/Vaping   • E-Cigarette Use Never User      E-Cigarette/Vaping Substances   • Nicotine No    • THC No    • CBD No    • Flavoring No    • Other No    • Unknown No      Family History:   Family History   Problem Relation Age of Onset   • Diabetes Mother         mellitus   • Lung cancer Mother    • Coronary artery disease Father        Meds/Allergies   all medications and allergies reviewed  Allergies   Allergen Reactions   • Shellfish-Derived Products - Food Allergy Anaphylaxis   • Erythromycin GI Intolerance       Objective   First Vitals:   Blood Pressure: 159/92 (12/04/22 0015)  Pulse: (!) 114 (12/04/22 0015)  Temperature: 99 7 °F (37 6 °C) (12/04/22 0015)  Temp Source: Oral (12/04/22 0015)  Respirations: 16 (12/04/22 0015)  SpO2: 93 % (12/04/22 0015)    Current Vitals:   Blood Pressure: 159/92 (12/04/22 0015)  Pulse: (!) 114 (12/04/22 0015)  Temperature: 99 7 °F (37 6 °C) (12/04/22 0015)  Temp Source: Oral (12/04/22 0015)  Respirations: 16 (12/04/22 0015)  SpO2: 93 % (12/04/22 0015)    No intake or output data in the 24 hours ending 12/04/22 0029    Invasive Devices     Central Venous Catheter Line Duration           Port A Cath 03/10/22 Right Chest 268 days          Drain  Duration           Ileostomy LUQ 16 days                Physical Exam  Constitutional:       General: He is not in acute distress  Appearance: He is obese  He is not toxic-appearing  HENT:      Head: Normocephalic and atraumatic  Mouth/Throat:      Mouth: Mucous membranes are moist    Eyes:      General: No scleral icterus  Pupils: Pupils are equal, round, and reactive to light  Cardiovascular:      Rate and Rhythm: Normal rate and regular rhythm  Pulmonary:      Effort: Pulmonary effort is normal  No respiratory distress  Abdominal:      General: A surgical scar is present  The ostomy site is clean  There is no distension  Palpations: Abdomen is soft  Tenderness: There is generalized abdominal tenderness  There is no guarding or rebound  Musculoskeletal:         General: No swelling or deformity  Cervical back: Normal range of motion  No rigidity  Skin:     General: Skin is warm and dry  Capillary Refill: Capillary refill takes 2 to 3 seconds  Coloration: Skin is not jaundiced  Neurological:      Mental Status: He is alert  Psychiatric:         Mood and Affect: Mood normal          Behavior: Behavior normal          Lab Results: No new labs upon readmission    Imaging: No new imaging upon readmission  EKG, Pathology, and Other Studies: I have personally reviewed pertinent reports     and I have personally reviewed pertinent films in PACS    Code Status: Level 1 - Full Code  Advance Directive and Living Will:      Power of :    POLST:

## 2022-12-04 NOTE — CASE MANAGEMENT
Case Management Assessment & Discharge Planning Note    [facility] name [facility] Holloway  [facility] [id number]  : 1965 Date 2022       Current Admission Date: 2022  Current Admission Diagnosis:Malignant neoplasm of transverse colon [facility])   Patient Active Problem List    Diagnosis Date Noted   • Flash pulmonary edema ([address] ) 2022   • MR (mitral regurgitation) 2022   • Bacteremia 2022   • ESBL (extended spectrum beta-lactamase) producing bacteria infection 2022   • Acute respiratory failure with hypoxia ([address] ) 2022   • Encephalopathy 2022   • Cervical radiculopathy 10/26/2022   • Other fatigue 06/15/2022   • Colostomy prolapse ([address] ) 2022   • Colon cancer metastasized to liver ([facility] [address] ) 2022   • 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 liver ([facility] [address] ) 2022   • Iron deficiency anemia, unspecified 2022   • Malignant neoplasm of transverse colon ([facility] [address] ) 2022   • Thrombocytosis 2022   • Hypokalemia 2022   • Transaminitis 2022   • Type 2 diabetes mellitus with hyperlipidemia ([address] ) 2022   • Colonic mass 2022   • Microcytic anemia 2022   • Left ureteral calculus 2020   • Incarcerated umbilical hernia    • Testicular hypogonadism 2017   • Low testosterone 2017   • Type 2 diabetes mellitus without complication, with long-term current use of insulin ([facility] [address] ) 2016   • Benign essential hypertension 2016   • Mixed hyperlipidemia 2016   • Erectile dysfunction 2016   • Obesity (BMI 30-39 9) 2016      LOS (days): 0  Geometric Mean LOS (GMLOS) (days):   Days to GMLOS:     OBJECTIVE:              Current admission status: Observation       Preferred Pharmacy:   [facility]/pharmacy #[id number] [facility] [place] [place] - [address]  [place] [address]  Phone: [phone] Fax: [phone]    [facility] [place], [address] [facility] [address] [facility] [address]  Phone: [phone] Fax: [phone]    [facility] - [address] [place] [address]  Phone: [phone] Fax: [phone]    Primary Care Provider: Demarcus Ramos MD    Primary Insurance: [employer]oh[employer]  Secondary Insurance:     ASSESSMENT:  [address] Proxies    There are no active Health Care Proxies on file  Advance Directives  Does patient have a [address]?: No  Does patient have Advance Directives?: No         Readmission Root Cause  30 Day Readmission: Yes  Who directed you to return to the hospital?: Other (comment) (facility)  Patient was readmitted due to: SNF could not manage  Action Plan: Placement    Patient Information  Admitted from[de-identified] Facility  Mental Status: Alert                                 DISCHARGE DETAILS:    Discharge planning discussed with[de-identified] wife  Freedom of Choice: Yes                   Contacts  Patient Contacts: Valarie Redman (spouse)  Relationship to Patient[de-identified] Family  Contact Method: Phone  Phone Number: [phone]  Reason/Outcome: Continuity of Care, Emergency Contact, Discharge Planning                   Would you like to participate in our [address] service program?  : No - Declined    Treatment Team Recommendation: Short Term Rehab                                            Additional Comments: PT DC'd to [facility] who sent pt back to hospital as they could not manage care     Readmit=placed in obs    Per wife, SNF did not have pain meds to manage pain, said wound was infected and they could not provide care pt needed  States pt was sent back to the hospital and she was  Not notified by [facility] or   Upset  Requesting she be called with updates related to patient  States pts brother was called instead of her    Discussed DCP  They would like [facility], [facility], [facility]    Aware dependent on insurance approval, bed availability and pt ability to tolerate program   Referrals entered in [facility]

## 2022-12-04 NOTE — RESPIRATORY THERAPY NOTE
RT Protocol Note  Marta Ahumada 61 y o  male MRN: [id number]  [facility] Encounter: [id number]    Assessment    Active Problems:    * No active hospital problems  *      Home Pulmonary Medications:  None per chart hx or Pt  Past Medical History:   Diagnosis Date    Abdominal pain 03/12/2022    Acute renal failure ([place] [address] )     [id number] resolved    Cancer ([place] [address] )     Diabetes mellitus ([place] [address] )     Enteritis 08/23/2016    Gastroparesis due to DM ([place] [address] ) 08/23/2016    GERD (gastroesophageal reflux disease)     Hernia, ventral 08/04/2016    Hyperlipidemia     Hypertension     Morbid obesity ([place] [address] ) 04/17/2018    Postoperative visit 03/02/2022    SIRS (systemic inflammatory response syndrome) ([place] [address] ) 03/12/2022    Snoring     Stage 3a chronic kidney disease ([place] [address] ) 02/19/2022     Social History     Socioeconomic History    Marital status: /Civil Union     Spouse name: Not on file    Number of children: Not on file    Years of education: Not on file    Highest education level: Not on file   Occupational History    Occupation: ACTOR     Employer: [employer]   Tobacco Use    Smoking status: Never    Smokeless tobacco: Never   Vaping Use    Vaping Use: Never used   Substance and Sexual Activity    Alcohol use: Never    Drug use: No    Sexual activity: Yes     Partners: Female   Other Topics Concern    Not on file   Social History Narrative    Not on file     Social Determinants of Health     Financial Resource Strain: Not on file   Food Insecurity: No Food Insecurity    Worried About Running Out of Food in the Last Year: Never true    [address] in the Last Year: Never true   Transportation Needs: No Transportation Needs    Lack of Transportation (Medical): No    Lack of Transportation (Non-Medical):  No   Physical Activity: Insufficiently Active    Days of Exercise per Week: 5 days    Minutes of Exercise per Session: 10 min   Stress: No Stress Concern Present    Feeling of Stress : Not at all Social Connections: Not on file   Intimate Partner Violence: Not on file   Housing Stability: Low Risk     Unable to Pay for Housing in the Last Year: No    Number of Places Lived in the Last Year: 1    Unstable Housing in the Last Year: No       Subjective         Objective    Physical Exam:   Assessment Type: (P) Assess only  Bilateral Breath Sounds: (P) Clear (tubular lower lobes)  Cough: (P) None    Vitals:  Blood pressure 159/92, pulse (!) 114, temperature 99 7 °F (37 6 °C), temperature source Oral, resp  rate 16, height 5' 8" (1 727 m), SpO2 93 %  Imaging and other studies: I have personally reviewed pertinent reports  Plan    Respiratory Plan: (P) Discontinue Protocol        Resp Comments: (P) Pt  evaluated for respiratory protocol  BS=clear with tubular lower lobes  Pt  in no distress on 1lpm nasal cannula  SpO2=94%  Pt  uses no respiratory medications at home  No indication for respiratory intervention at this time  Protocol D/C

## 2022-12-04 NOTE — PHYSICAL THERAPY NOTE
Physical Therapy Evaluation     Patient's Name: Neeraj Hair    Admitting Diagnosis  Altered bowel elimination due to intestinal ostomy [facility]) [K94 [id number]]    Problem List  Patient Active Problem List   Diagnosis    Type 2 diabetes mellitus without complication, with long-term current use of insulin ([facility] )    Benign essential hypertension    Mixed hyperlipidemia    Erectile dysfunction    Low testosterone    Obesity (BMI 30-39  9)    Testicular hypogonadism    Incarcerated umbilical hernia    Left ureteral calculus    Type 2 diabetes mellitus with hyperlipidemia (HCC)    Colonic mass    Microcytic anemia    Hypokalemia    Transaminitis    Thrombocytosis    Iron deficiency anemia, unspecified    Malignant neoplasm of transverse colon (HCC)    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)    Colon cancer metastasized to liver (HCC)    Colostomy prolapse (HCC)    Other fatigue    Cervical radiculopathy    Encephalopathy    Flash pulmonary edema (HCC)    MR (mitral regurgitation)    Bacteremia    ESBL (extended spectrum beta-lactamase) producing bacteria infection    Acute respiratory failure with hypoxia ([facility] )       Past Medical History  Past Medical History:   Diagnosis Date    Abdominal pain 03/12/2022    Acute renal failure ([facility] )     [id number] resolved    Cancer ([facility] )     Diabetes mellitus ([facility] )     Enteritis 08/23/2016    Gastroparesis due to DM ([facility] ) 08/23/2016    GERD (gastroesophageal reflux disease)     Hernia, ventral 08/04/2016    Hyperlipidemia     Hypertension     Morbid obesity ([facility] ) 04/17/2018    Postoperative visit 03/02/2022    SIRS (systemic inflammatory response syndrome) ([facility] ) 03/12/2022    Snoring     Stage 3a chronic kidney disease ([facility] ) 02/19/2022       Past Surgical History  Past Surgical History:   Procedure Laterality Date    COLONOSCOPY      COLOSTOMY N/A 02/20/2022    Procedure: COLOSTOMY LOOP, diverting;  Surgeon: Brandee Jauregui MD;  [facility]: [facility];  Service: General ESOPHAGOGASTRODUODENOSCOPY N/A 08/24/2016    Procedure: ESOPHAGOGASTRODUODENOSCOPY (EGD); Surgeon: Luz Stallings MD;  Location: [facility];   Service:     EXPLORATORY LAPAROTOMY W/ BOWEL RESECTION N/A 11/16/2022    Procedure: LAPAROTOMY EXPLORATORY W/ BOWEL RESECTION- VAC PLACEMENT;  Surgeon: Dary Zavala MD;  Location: [facility];  Service: Surgical Oncology    EXPLORATORY LAPAROTOMY W/ BOWEL RESECTION N/A 11/17/2022    Procedure: LAPAROTOMY EXPLORATORY W/ BOWEL RESECTION;  Surgeon: Dary Zavala MD;  [facility];  Service: Surgical Oncology    ILEOSTOMY N/A 11/17/2022    Procedure: ILEOSTOMY;  Surgeon: Dary Zavala MD;  [facility];  Service: Surgical Oncology    ILEOSTOMY CLOSURE N/A 11/7/2022    Procedure: REVERSAL COLOSTOMY;  Surgeon: Demetrio Ibarra MD;  [facility];  Service: Surgical Oncology    IR PORT PLACEMENT  03/10/2022    KIDNEY STONE SURGERY      LIVER BIOPSY LAPAROSCOPIC N/A 02/20/2022    Procedure: DIAGNOSTIC LAPAROSCOPIC LIVER BIOPSY, DIVERTING LOOP COLOSTOMY ;  Surgeon: Zully Jamil MD;  [facility];  Service: General    LIVER LOBECTOMY N/A 11/7/2022    Procedure: SEGMENT 3 LIVER RESECTION, ABLATION, INTRAOPERATIVE U/S OF LIVER, PERITONEAL BIOPSY;  Surgeon: Demetrio Ibarra MD;  [facility];  Service: Surgical Oncology    RIGHT COLON RESECTION N/A 11/7/2022    Procedure: EX LAP, TRANVERSE COLON RESECTION;  Surgeon: Demetrio Ibarra MD;  [facility];  Service: Surgical Oncology    TONSILLECTOMY      UMBILICAL HERNIA REPAIR LAPAROSCOPIC N/A 04/26/2019    Procedure: LAPAROSCOPIC UMBILICAL HERNIA REPAIR;  Surgeon: Zully Jamil MD;  [facility];  Service: General    VAC DRESSING APPLICATION N/A 90/83/2718    Procedure: APPLICATION VAC DRESSING ABDOMEN/TRUNK;  Surgeon: Dary Zavala MD;  [facility];  Service: Surgical Oncology        12/04/22 0843   PT Last Visit   PT Visit Date 12/04/22   Note Type   Note type Evaluation   Pain Assessment   Pain Assessment Tool 0-10   Pain Score No Pain   Restrictions/Precautions   Weight Bearing Precautions Per Order No   Other Precautions Multiple lines; Fall Risk; Chair Alarm; Bed Alarm   Home Living   Type of [facility] Multi-level;1/2 bath on main level;Bed/bath upstairs  (2 ADRIAN)   Bathroom Shower/Tub Walk-in shower   Bathroom Toilet Standard   Prior Function   Level of [place] Independent with ADLs; Independent with functional mobility   Lives With Spouse; Family  (2 teenage children)   Receives Help From Family   IADLs Independent with driving; Independent with meal prep; Independent with medication management   Falls in the last 6 months 0   Vocational Unemployed   General   Family/Caregiver Present No   Cognition   Overall Cognitive Status WFL   Arousal/Participation Cooperative   Orientation Level Oriented X4   Following Commands Follows one step commands without difficulty   RLE Assessment   RLE Assessment X  (Grossly 3+/5 MMT acessed w/ mobility)   LLE Assessment   LLE Assessment X  (Grossly 3+/5 MMT acessed w/ mobility)   Bed Mobility   Supine to Sit 3  Moderate assistance   Additional items Assist x 1; Increased time required;Verbal cues;LE management   Additional Comments Pt OOB in bedside recliner at end of session   Transfers   Sit to Stand 3  Moderate assistance   Additional items Assist x 1; Increased time required   Stand to Sit 3  Moderate assistance   Additional items Assist x 1; Increased time required;Verbal cues   Additional Comments Performed with RW   Ambulation/Elevation   Gait pattern Decreased foot clearance; Step through pattern   Gait Assistance 3  Moderate assist   Additional items Assist x 1;Verbal cues   Assistive Device Rolling walker   Distance 3'  (bed to chair)   Balance   Static Sitting Fair +   Dynamic Sitting Fair   Static Standing Fair -   Dynamic Standing Fair -   Ambulatory Poor +   Endurance Deficit   Endurance Deficit Yes   Activity Tolerance   Activity Tolerance Patient limited by fatigue;Patient tolerated treatment well   Nurse Made Aware RN cleared pt for PT evaluation   Assessment   Prognosis Good   Problem List Decreased strength;Decreased range of motion;Decreased endurance; Impaired balance;Decreased mobility; Decreased coordination;Decreased skin integrity   Assessment Pt seen for high complexity PT evaluation  Pt with active PT eval/treat orders  Pt is a 61 y o  male who was admitted to [address] [place] [address] on 12/4/2022  Pt's current dx/ problem list include: malignant neoplasm of transervse colon  Comorbidities affecting pt's physical performance at time of assessment are as follows: abdominal pain, acute renal failure, diabetes mellitus, GERD, HTN, SIRS, enteritis  Personal factors affecting pt at time of initial examination include: steps to enter environment, limited home support, past experience, inability to perform IADLs, inability to perform ADLs, inability to ambulate household distances  Due to acute medical issues, ongoing medical workup for primary dx; pain, fall risk, increased reliance on more restrictive AD compared to baseline;  decreased activity tolerance compared to baseline, increased assistance needed from caregiver at current time, multiple lines, decline in overall functional mobility status; health management issues; note unstable clinical picture (high complexity)  Patient was discharged to [facility]) and was turned away due to concerns about being able to care for patient  During today's evaluation, pt reports that his ability to perform transfers is the best that he has been able to do in a week and is very relieved at such  At baseline, pt resides with family in a 3  with 2 ADRIAN and was independent with ADLs/ IADLs  Currently, upon initial examination, pt  is requiring mod Ax1 for supine to sit and mod A x 1 for sit to stand and ambulation   PT to continue to follow and assess functional transfers and ambulation as appropriate and able  Currently, pt presents functioning below baseline and with overall mobility deficits 2* to: decreased LE strength/AROM; limited flexibility;  generalized weakness/ deconditioning; decreased endurance; decreased activity tolerance; impaired balance; gait deviations  Pt currently at increased risk for falls  At the end of evaluation, pt was left seated in bed side recliner with all needs in reach  Pt would benefit from continued skilled PT services while in hospital to address remaining limitations and maximize functional potential  The patient's AM-PAC Basic Mobility Inpatient Short Form Raw Score is 12  A Raw score of less than or equal to 16 suggests the patient may benefit from discharge to post-acute rehabilitation services  Please also refer to the recommendation of the Physical Therapist for safe discharge planning  Barriers to Discharge Inaccessible home environment;Decreased caregiver support   Goals   Patient Goals To get up   STG Expiration Date 12/18/22   Short Term Goal #1 STG 1  Pt will be able to perform bed mobility with mod I in order to improve overall functional mobility and assist in safe d/c  STG 2  Pt will be able to perform functional transfer with mod I in order to improve overall functional mobility and assist in safe d/c  STG 3  Pt will be able to ambulate at least 100 feet with least restrictive device with mod I A in order to improve overall functional mobility and assist in safe d/c  STG 4  Pt will improve sitting/standing static/dynamic balance 1 grade in order to improve functional mobility and assist in safe d/c  STG 5  Pt will improve LE strength by one grade in order to improve functional mobility and assist in safe d/c  STG 6   Pt will negotiate at least 1 step at mod I level A in order to improve overall funcitonal mobility and assist in safe d/c   Plan   Treatment/Interventions ADL retraining;LE strengthening/ROM; Functional transfer training;Elevations; Therapeutic exercise; Endurance training;Patient/family training;Equipment eval/education; Bed mobility;Gait training; Compensatory technique education;Continued evaluation;Spoke to MD;Spoke to nursing;OT;Family   PT Frequency 3-5x/wk   Recommendation   PT Discharge Recommendation Post acute rehabilitation services   Equipment Recommended [address] Basic Mobility Inpatient   Turning in Bed Without Bedrails 3   Lying on Back to Sitting on Edge of Flat Bed 2   Moving Bed to Chair 2   Standing Up From Chair 2   Walk in Room 2   Climb 3-5 Stairs 1   Basic Mobility Inpatient Raw Score 12   Basic Mobility Standardized Score 32 23   Highest Level Of Mobility   -HLM Goal 4: Move to chair/commode   JH-HLM Achieved 4: Move to chair/commode           Parvin Camejo, PT

## 2022-12-04 NOTE — PLAN OF CARE
Problem: PHYSICAL THERAPY ADULT  Goal: Performs mobility at highest level of function for planned discharge setting  See evaluation for individualized goals  Description: Treatment/Interventions: ADL retraining, LE strengthening/ROM, Functional transfer training, Elevations, Therapeutic exercise, Endurance training, Patient/family training, Equipment eval/education, Bed mobility, Gait training, Compensatory technique education, Continued evaluation, Spoke to MD, Spoke to nursing, OT, Family  Equipment Recommended: Verito Wallace       See flowsheet documentation for full assessment, interventions and recommendations  Note: Prognosis: Good  Problem List: Decreased strength, Decreased range of motion, Decreased endurance, Impaired balance, Decreased mobility, Decreased coordination, Decreased skin integrity  Assessment: Pt seen for high complexity PT evaluation  Pt with active PT eval/treat orders  Pt is a 61 y o  male who was admitted to [place] [address] observation status on 12/4/2022  Pt's current dx/ problem list include: malignant neoplasm of transervse colon  Comorbidities affecting pt's physical performance at time of assessment are as follows: abdominal pain, acute renal failure, diabetes mellitus, GERD, HTN, SIRS, enteritis  Personal factors affecting pt at time of initial examination include: steps to enter environment, limited home support, past experience, inability to perform IADLs, inability to perform ADLs, inability to ambulate household distances  Due to acute medical issues, ongoing medical workup for primary dx; pain, fall risk, increased reliance on more restrictive AD compared to baseline;  decreased activity tolerance compared to baseline, increased assistance needed from caregiver at current time, multiple lines, decline in overall functional mobility status; health management issues; note unstable clinical picture (high complexity)  Patient was discharged to [facility]) and was turned away due to concerns about being able to care for patient  During today's evaluation, pt reports that his ability to perform transfers is the best that he has been able to do in a week and is very relieved at such  At baseline, pt resides with family in a 3 SH with 2 ADRIAN and was independent with ADLs/ IADLs  Currently, upon initial examination, pt  is requiring mod Ax1 for supine to sit and mod A x 1 for sit to stand and ambulation  PT to continue to follow and assess functional transfers and ambulation as appropriate and able  Currently, pt presents functioning below baseline and with overall mobility deficits 2* to: decreased LE strength/AROM; limited flexibility;  generalized weakness/ deconditioning; decreased endurance; decreased activity tolerance; impaired balance; gait deviations  Pt currently at increased risk for falls  At the end of evaluation, pt was left seated in bed side recliner with all needs in reach  Pt would benefit from continued skilled PT services while in hospital to address remaining limitations and maximize functional potential  The patient's AM-PAC Basic Mobility Inpatient Short Form Raw Score is 12  A Raw score of less than or equal to 16 suggests the patient may benefit from discharge to post-acute rehabilitation services  Please also refer to the recommendation of the Physical Therapist for safe discharge planning  Barriers to Discharge: Inaccessible home environment, Decreased caregiver support     PT Discharge Recommendation: Post acute rehabilitation services    See flowsheet documentation for full assessment

## 2022-12-05 ENCOUNTER — TRANSITIONAL CARE MANAGEMENT (OUTPATIENT)
Dept: INTERNAL MEDICINE CLINIC | Facility: CLINIC | Age: 57
End: 2022-12-05

## 2022-12-05 LAB
ANION GAP SERPL CALCULATED.3IONS-SCNC: 7 MMOL/L (ref 4–13)
BASOPHILS # BLD AUTO: 0.03 THOUSANDS/ÂΜL (ref 0–0.1)
BASOPHILS NFR BLD AUTO: 0 % (ref 0–1)
BUN SERPL-MCNC: 19 MG/DL (ref 5–25)
CALCIUM SERPL-MCNC: 8.5 MG/DL (ref 8.3–10.1)
CHLORIDE SERPL-SCNC: 112 MMOL/L (ref 96–108)
CO2 SERPL-SCNC: 20 MMOL/L (ref 21–32)
CREAT SERPL-MCNC: 1.23 MG/DL (ref 0.6–1.3)
EOSINOPHIL # BLD AUTO: 0.2 THOUSAND/ÂΜL (ref 0–0.61)
EOSINOPHIL NFR BLD AUTO: 2 % (ref 0–6)
ERYTHROCYTE [DISTWIDTH] IN BLOOD BY AUTOMATED COUNT: 16.1 % (ref 11.6–15.1)
GFR SERPL CREATININE-BSD FRML MDRD: 64 ML/MIN/1.73SQ M
GLUCOSE SERPL-MCNC: 164 MG/DL (ref 65–140)
GLUCOSE SERPL-MCNC: 164 MG/DL (ref 65–140)
GLUCOSE SERPL-MCNC: 174 MG/DL (ref 65–140)
GLUCOSE SERPL-MCNC: 180 MG/DL (ref 65–140)
GLUCOSE SERPL-MCNC: 191 MG/DL (ref 65–140)
GLUCOSE SERPL-MCNC: 203 MG/DL (ref 65–140)
HCT VFR BLD AUTO: 27.7 % (ref 36.5–49.3)
HGB BLD-MCNC: 8.3 G/DL (ref 12–17)
IMM GRANULOCYTES # BLD AUTO: 0.08 THOUSAND/UL (ref 0–0.2)
IMM GRANULOCYTES NFR BLD AUTO: 1 % (ref 0–2)
LYMPHOCYTES # BLD AUTO: 1.09 THOUSANDS/ÂΜL (ref 0.6–4.47)
LYMPHOCYTES NFR BLD AUTO: 8 % (ref 14–44)
MCH RBC QN AUTO: 27.1 PG (ref 26.8–34.3)
MCHC RBC AUTO-ENTMCNC: 30 G/DL (ref 31.4–37.4)
MCV RBC AUTO: 91 FL (ref 82–98)
MONOCYTES # BLD AUTO: 1.36 THOUSAND/ÂΜL (ref 0.17–1.22)
MONOCYTES NFR BLD AUTO: 11 % (ref 4–12)
NEUTROPHILS # BLD AUTO: 10.22 THOUSANDS/ÂΜL (ref 1.85–7.62)
NEUTS SEG NFR BLD AUTO: 78 % (ref 43–75)
NRBC BLD AUTO-RTO: 0 /100 WBCS
PLATELET # BLD AUTO: 415 THOUSANDS/UL (ref 149–390)
PMV BLD AUTO: 10.3 FL (ref 8.9–12.7)
POTASSIUM SERPL-SCNC: 3.8 MMOL/L (ref 3.5–5.3)
RBC # BLD AUTO: 3.06 MILLION/UL (ref 3.88–5.62)
SODIUM SERPL-SCNC: 139 MMOL/L (ref 135–147)
WBC # BLD AUTO: 12.98 THOUSAND/UL (ref 4.31–10.16)

## 2022-12-05 PROCEDURE — 2W23X4Z DRESSING OF ABDOMINAL WALL USING BANDAGE: ICD-10-PCS | Performed by: SURGERY

## 2022-12-05 RX ORDER — CALCIUM CARBONATE 200(500)MG
500 TABLET,CHEWABLE ORAL 2 TIMES DAILY PRN
Status: DISCONTINUED | OUTPATIENT
Start: 2022-12-05 | End: 2022-12-14 | Stop reason: HOSPADM

## 2022-12-05 RX ORDER — INSULIN LISPRO 100 [IU]/ML
2-12 INJECTION, SOLUTION INTRAVENOUS; SUBCUTANEOUS
Status: DISCONTINUED | OUTPATIENT
Start: 2022-12-05 | End: 2022-12-11

## 2022-12-05 RX ORDER — HYDROMORPHONE HCL/PF 1 MG/ML
0.5 SYRINGE (ML) INJECTION
Status: DISCONTINUED | OUTPATIENT
Start: 2022-12-05 | End: 2022-12-14 | Stop reason: HOSPADM

## 2022-12-05 RX ADMIN — OXYCODONE HYDROCHLORIDE 5 MG: 5 TABLET ORAL at 06:08

## 2022-12-05 RX ADMIN — CHOLECALCIFEROL TAB 10 MCG (400 UNIT) 400 UNITS: 10 TAB at 08:01

## 2022-12-05 RX ADMIN — INSULIN LISPRO 2 UNITS: 100 INJECTION, SOLUTION INTRAVENOUS; SUBCUTANEOUS at 09:09

## 2022-12-05 RX ADMIN — ACETAMINOPHEN 975 MG: 325 TABLET ORAL at 13:03

## 2022-12-05 RX ADMIN — ATORVASTATIN CALCIUM 40 MG: 40 TABLET, FILM COATED ORAL at 08:02

## 2022-12-05 RX ADMIN — INSULIN LISPRO 4 UNITS: 100 INJECTION, SOLUTION INTRAVENOUS; SUBCUTANEOUS at 17:01

## 2022-12-05 RX ADMIN — ENOXAPARIN SODIUM 40 MG: 40 INJECTION SUBCUTANEOUS at 08:02

## 2022-12-05 RX ADMIN — ACETAMINOPHEN 975 MG: 325 TABLET ORAL at 06:06

## 2022-12-05 RX ADMIN — AMLODIPINE BESYLATE 5 MG: 5 TABLET ORAL at 16:31

## 2022-12-05 RX ADMIN — ONDANSETRON 4 MG: 4 TABLET, ORALLY DISINTEGRATING ORAL at 19:26

## 2022-12-05 RX ADMIN — ACETAMINOPHEN 975 MG: 325 TABLET ORAL at 21:15

## 2022-12-05 RX ADMIN — HYDROMORPHONE HYDROCHLORIDE 0.5 MG: 1 INJECTION, SOLUTION INTRAMUSCULAR; INTRAVENOUS; SUBCUTANEOUS at 21:15

## 2022-12-05 RX ADMIN — INSULIN GLARGINE 15 UNITS: 100 INJECTION, SOLUTION SUBCUTANEOUS at 21:14

## 2022-12-05 RX ADMIN — INSULIN LISPRO 2 UNITS: 100 INJECTION, SOLUTION INTRAVENOUS; SUBCUTANEOUS at 11:14

## 2022-12-05 RX ADMIN — INSULIN LISPRO 2 UNITS: 100 INJECTION, SOLUTION INTRAVENOUS; SUBCUTANEOUS at 07:56

## 2022-12-05 RX ADMIN — PANTOPRAZOLE SODIUM 40 MG: 40 TABLET, DELAYED RELEASE ORAL at 16:31

## 2022-12-05 RX ADMIN — LISINOPRIL 40 MG: 20 TABLET ORAL at 08:01

## 2022-12-05 RX ADMIN — AMLODIPINE BESYLATE 5 MG: 5 TABLET ORAL at 08:02

## 2022-12-05 RX ADMIN — Medication 1 TABLET: at 08:01

## 2022-12-05 RX ADMIN — PANTOPRAZOLE SODIUM 40 MG: 40 TABLET, DELAYED RELEASE ORAL at 06:06

## 2022-12-05 RX ADMIN — ENOXAPARIN SODIUM 40 MG: 40 INJECTION SUBCUTANEOUS at 16:31

## 2022-12-05 RX ADMIN — METHOCARBAMOL TABLETS 500 MG: 500 TABLET, COATED ORAL at 16:31

## 2022-12-05 RX ADMIN — OXYCODONE HYDROCHLORIDE 5 MG: 5 TABLET ORAL at 18:47

## 2022-12-05 RX ADMIN — ASPIRIN 81 MG CHEWABLE TABLET 81 MG: 81 TABLET CHEWABLE at 08:01

## 2022-12-05 RX ADMIN — METHOCARBAMOL TABLETS 500 MG: 500 TABLET, COATED ORAL at 08:01

## 2022-12-05 NOTE — CASE MANAGEMENT
Case Management Discharge Planning Note    Patient name Gene Lynn  Location [facility]06/[facility] 298-70 MRN [id number]  : 1965 Date 2022       Current Admission Date: 2022  Current Admission Diagnosis:Malignant neoplasm of transverse colon [facility])   Patient Active Problem List    Diagnosis Date Noted   • Flash pulmonary edema ([address] ) 2022   • MR (mitral regurgitation) 2022   • Bacteremia 2022   • ESBL (extended spectrum beta-lactamase) producing bacteria infection 2022   • Acute respiratory failure with hypoxia ([address] ) 2022   • Encephalopathy 2022   • Cervical radiculopathy 10/26/2022   • Other fatigue 06/15/2022   • Colostomy prolapse ([address] ) 2022   • Colon cancer metastasized to liver ([facility] ) 2022   • 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 liver ([facility] ) 2022   • Iron deficiency anemia, unspecified 2022   • Malignant neoplasm of transverse colon ([facility] ) 2022   • Thrombocytosis 2022   • Hypokalemia 2022   • Transaminitis 2022   • Type 2 diabetes mellitus with hyperlipidemia ([facility] ) 2022   • Colonic mass 2022   • Microcytic anemia 2022   • Left ureteral calculus 2020   • Incarcerated umbilical hernia    • Testicular hypogonadism 2017   • Low testosterone 2017   • Type 2 diabetes mellitus without complication, with long-term current use of insulin ([facility] ) 2016   • Benign essential hypertension 2016   • Mixed hyperlipidemia 2016   • Erectile dysfunction 2016   • Obesity (BMI 30-39 9) 2016      LOS (days): 0  Geometric Mean LOS (GMLOS) (days):   Days to GMLOS:     OBJECTIVE:            Current admission status: Observation   Preferred Pharmacy:   [facility]/pharmacy #[id number] [place], [place] - [address] [place] [address]  Phone: [phone] Fax: [phone] - [place], [address] [address] [address] [facility] [address]  Phone: [phone] Fax: [phone]    [facility]/pharmacy #[id number] [place] - [address] [facility] [address]  Phone: [phone] Fax: [phone]    Primary Care Provider: Jorge Hartley MD    Primary Insurance: [employer]  Secondary Insurance:     DISCHARGE DETAILS:    Discharge planning discussed with[de-identified] Patient  Freedom of Choice: Yes        Were Treatment Team discharge recommendations reviewed with patient/caregiver?: Yes  Did patient/caregiver verbalize understanding of patient care needs?: Yes  Were patient/caregiver advised of the risks associated with not following Treatment Team discharge recommendations?: Yes      Other Referral/Resources/Interventions Provided:  Referral Comments: Patient is very motivated and wants to get better  Patient eager to start his rehab course  Spoke with therapy, and they feel as though the patient would be able to tolerate an acute level of rehab  Treatment Team Recommendation: Short Term Rehab, Acute Rehab  Discharge Destination Plan[de-identified] Acute Rehab, Short Term Rehab              Additional Comments: CM spoke with provider this morning  They are thinking that the patient would be medically cleared for d/c in the next 24-48 hours

## 2022-12-05 NOTE — ARC ADMISSION
Referral received for consideration of patient for inpatient acute rehab  Currently awaiting OT evaluation for further review with Mikal Nguyenwood Pavel  Will continue to follow patient's case at this time and update as able  Reviewed patient's case with [facility] physician - requesting for PM&R consult to be placed for further evaluation of patient  CM has been updated  Will continue to follow at this time and update as able

## 2022-12-05 NOTE — CONSULTS
PHYSICAL MEDICINE AND REHABILITATION CONSULT NOTE  Gabriela Shore 61 y o  male MRN: [id number]  Unit/Bed#: [facility] 906-01 Encounter: [id number]    Requested by (Physician/Service): George Ordonez MD  Reason for Consultation:  Assessment of rehabilitation needs    Assessment:  Rehabilitation Diagnosis:   • Debility   • Impaired mobility and self care     Recommendations:  Rehabilitation Plan:  • Continue PT/OT while on acute care  • The patient may be a candidate for acute inpatient rehabilitation once medically cleared  Attending to advise  Currently he continues with midline dressing changes and packing  • Covid-19 Testing: [facility] inpatient rehabilitation units require testing within 48 hours of all potential admissions at this time  *Re-testing is NOT required for patients recovering from COVID-19 infection if isolation has been discontinued per CDC criteria  Medical Co-morbidities Plan:  · Metastatic colonic adenocarcinoma s/p resection, multiple explorations and ileostomy c/b intra-abdominal abscess   · Acute postoperative pain   · Diabetes type 2  · Sepsis/ESBL bacteremia   · Left cervical radiculopathy  · DVT ppx: Lovenox and SCD    Thank you for this consultation  Do not hesitate to contact service with further questions  GAURANG Moyer  PM&R    Total time spent:  1 hour with more than 50% spent counseling/coordinating care  Counseling includes extended discussion with patient (+/- family/relevant historian)  re: history, exam, function, mood, rehabilitation management plan, medical co-morbidities plan, and disposition options  Additional time spent with thorough chart review in EMR, reviewing recent medications, labs, imaging, and management plan of primary service  History of Present Illness:  Gabriela Shore is a 61 y o  male who had a prolonged hospitalization from 11/7-12/3 due to colon adenocarcinoma   He underwent exploratory laparotomy, resection of hepatic segment 3, resection of transverse colon with reversal lop colostomy  He returned to the OR for right hemicolectomy in discontinuity and a temporary abdominal closure device placement  He had re-explorations with colonic anastomosis and creation of a diverting loop ileostomy with midline facial closure  His diet was advanced  ID was consulted for sepsis and abdominal abscess, he completed a prolonged course of antibiotics  He was discharged to SNF for rehabilitation however was sent back from SNF due to inability to care for abdominal wounds as well as concern for infection  Surgical oncology is following  He currently has wet to dry dressings for midline incision and packing of colostomy site  Iliostomy appears to have good output and is pink  PM&R are consulted for rehabilitation recommendations  The patient was seen in his room  He is tearful at times about being in the hospital for a prolonged course  He is scared to go back to the SNF he was sent to  He is very motivated to get better and get home  Currently he denies any abdominal pain, SOB, chest pain, he does feel generally weak but reports getting OOB to the chair multiple times today  Review of Systems: 10 point ROS negative except for what is noted in HPI    Function:  Prior level of function and living situation: The patient lives in a multilevel home with 3 to 4 ADRIAN  He has a 1/2 bath on the 1st floor and can have [address]  He lives with his spouse and 15and 16year old children who are very supportive and can assist as needed  He was independent and 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  Current level of function:  Physical Therapy: supervision for bed mobility, moderate to maximal assist for transfers     Occupational Therapy: Independent for eating, grooming, supervision for UB bathing/dressing, moderate assist for LB bathing/dressing and toileting     Physical Exam:  /79   Pulse 83   Temp (!) 97 3 °F (36 3 °C) (Temporal) Resp 16   Ht 5' 7 99" (1 727 m)   Wt 103 kg (226 lb 3 1 oz)   SpO2 96%   BMI 34 40 kg/m²        Intake/Output Summary (Last 24 hours) at 12/5/2022 1456  Last data filed at 12/5/2022 1303  Gross per 24 hour   Intake 720 ml   Output 2520 ml   Net -1800 ml       Body mass index is 34 4 kg/m²  Physical Exam  Constitutional:       General: He is not in acute distress  Appearance: He is not toxic-appearing  HENT:      Head: Normocephalic and atraumatic  Right Ear: External ear normal       Left Ear: External ear normal       Nose: Nose normal       Mouth/Throat:      Mouth: Mucous membranes are moist       Pharynx: Oropharynx is clear  Eyes:      Extraocular Movements: Extraocular movements intact  Pulmonary:      Effort: Pulmonary effort is normal  No respiratory distress  Abdominal:      General: There is no distension  Tenderness: There is no abdominal tenderness  Comments: Ileostomy with good output, abdominal dressing just changed, some midline erythema noted, packing in place   Musculoskeletal:         General: Normal range of motion  Skin:     General: Skin is warm and dry  Neurological:      Mental Status: He is alert and oriented to person, place, and time     Psychiatric:         Mood and Affect: Mood normal               Social History:    Social History     Socioeconomic History   • Marital status: /Civil Union     Spouse name: Not on file   • Number of children: Not on file   • Years of education: Not on file   • Highest education level: Not on file   Occupational History   • Occupation: ACTOR     Employer: [employer]   Tobacco Use   • Smoking status: Never   • Smokeless tobacco: Never   Vaping Use   • Vaping Use: Never used   Substance and Sexual Activity   • Alcohol use: Never   • Drug use: No   • Sexual activity: Yes     Partners: Female   Other Topics Concern   • Not on file   Social History Narrative   • Not on file     Social Determinants of Health Financial Resource Strain: Not on file   Food Insecurity: No Food Insecurity   • Worried About [address] [employer] in the Last Year: Never true   • Ran Out of Food in the Last Year: Never true   Transportation Needs: No Transportation Needs   • Lack of Transportation (Medical): No   • Lack of Transportation (Non-Medical):  No   Physical Activity: Insufficiently Active   • Days of Exercise per Week: 5 days   • Minutes of Exercise per Session: 10 min   Stress: No Stress Concern Present   • Feeling of Stress : Not at all   Social Connections: Not on file   Intimate Partner Violence: Not on file   Housing Stability: Low Risk    • Unable to Pay for Housing in the Last Year: No   • Number of Places Lived in the Last Year: 1   • Unstable Housing in the Last Year: No        Family History:    Family History   Problem Relation Age of Onset   • Diabetes Mother         mellitus   • Lung cancer Mother    • Coronary artery disease Father          Medications:     Current Facility-Administered Medications:   •  acetaminophen (TYLENOL) tablet 975 mg, 975 mg, Oral, Q8H [facility], Stephanie Peralta MD, 975 mg at 12/05/22 1303  •  amLODIPine (NORVASC) tablet 5 mg, 5 mg, Oral, BID, Stephanie Peralta MD, 5 mg at 12/05/22 0548  •  aspirin chewable tablet 81 mg, 81 mg, Oral, Daily, Stephanie Peralta MD, 81 mg at 12/05/22 0801  •  atorvastatin (LIPITOR) tablet 40 mg, 40 mg, Oral, Daily, Stephanie Peralta MD, 40 mg at 12/05/22 5284  •  cholecalciferol (VITAMIN D3) tablet 400 Units, 400 Units, Oral, Daily, Stephanie Peralta MD, 400 Units at 12/05/22 0801  •  enoxaparin (LOVENOX) subcutaneous injection 40 mg, 40 mg, Subcutaneous, BID, Stephanie Peralta MD, 40 mg at 12/05/22 0802  •  insulin glargine (LANTUS) subcutaneous injection 15 Units 0 15 mL, 15 Units, Subcutaneous, HS, Stephanie Peralta MD, 15 Units at 12/04/22 2201  •  insulin lispro (HumaLOG) 100 units/mL subcutaneous injection 2-12 Units, 2-12 Units, Subcutaneous, TID AC, 2 Units at 12/05/22 1114 **AND** Fingerstick Glucose (POCT), , , TID ACBrynn MD  •  lisinopril (ZESTRIL) tablet 40 mg, 40 mg, Oral, Daily, Edgar Sanchez MD, 40 mg at 12/05/22 0801  •  methocarbamol (ROBAXIN) tablet 500 mg, 500 mg, Oral, BID, Edgar Sanchez MD, 500 mg at 12/05/22 0801  •  multivitamin-minerals (CENTRUM) tablet 1 tablet, 1 tablet, Oral, Daily, Edgar Sanchez MD, 1 tablet at 12/05/22 0801  •  ondansetron (ZOFRAN-ODT) dispersible tablet 4 mg, 4 mg, Oral, Q6H PRN, Edgar Sanchez MD  •  oxyCODONE (ROXICODONE) immediate release tablet 10 mg, 10 mg, Oral, Q6H PRN, Edgar Sanchez MD, 10 mg at 12/04/22 2201  •  oxyCODONE (ROXICODONE) IR tablet 5 mg, 5 mg, Oral, Q6H PRN, Edgar Sanchez MD, 5 mg at 12/05/22 0608  •  pantoprazole (PROTONIX) EC tablet 40 mg, 40 mg, Oral, BID AC, Edgar Sanchez MD, 40 mg at 12/05/22 0606    Past Medical History:     Past Medical History:   Diagnosis Date   • Abdominal pain 03/12/2022   • Acute renal failure ([place] [address] )     [id number] resolved   • Cancer ([place] [address] )    • Diabetes mellitus ([facility] )    • Enteritis 08/23/2016   • Gastroparesis due to DM ([facility] ) 08/23/2016   • GERD (gastroesophageal reflux disease)    • Hernia, ventral 08/04/2016   • Hyperlipidemia    • Hypertension    • Morbid obesity ([facility] ) 04/17/2018   • Postoperative visit 03/02/2022   • SIRS (systemic inflammatory response syndrome) ([facility] ) 03/12/2022   • Snoring    • Stage 3a chronic kidney disease ([facility] ) 02/19/2022        Past Surgical History:     Past Surgical History:   Procedure Laterality Date   • COLONOSCOPY     • COLOSTOMY N/A 02/20/2022    Procedure: COLOSTOMY LOOP, diverting;  Surgeon: Jayme Evans MD;  Location: [facility];  Service: General   • ESOPHAGOGASTRODUODENOSCOPY N/A 08/24/2016    Procedure: ESOPHAGOGASTRODUODENOSCOPY (EGD); Surgeon: Elias Ornelas MD;  Location: [facility] LAB;   Service:    • EXPLORATORY LAPAROTOMY W/ BOWEL RESECTION N/A 11/16/2022    Procedure: LAPAROTOMY EXPLORATORY W/ BOWEL RESECTION- VAC PLACEMENT;  Surgeon: Carlos Leiva MD;  Location: [facility];  Service: Surgical Oncology   • EXPLORATORY LAPAROTOMY W/ BOWEL RESECTION N/A 11/17/2022    Procedure: LAPAROTOMY EXPLORATORY W/ BOWEL RESECTION;  Surgeon: Carlos Leiva MD;  Location: [facility];  Service: Surgical Oncology   • ILEOSTOMY N/A 11/17/2022    Procedure: ILEOSTOMY;  Surgeon: Carlos Leiav MD;  Location: [facility];  Service: Surgical Oncology   • ILEOSTOMY CLOSURE N/A 11/7/2022    Procedure: REVERSAL COLOSTOMY;  Surgeon: George Ordonez MD;  [facility];  Service: Surgical Oncology   • IR PORT PLACEMENT  03/10/2022   • KIDNEY STONE SURGERY     • LIVER BIOPSY LAPAROSCOPIC N/A 02/20/2022    Procedure: DIAGNOSTIC LAPAROSCOPIC LIVER BIOPSY, DIVERTING LOOP COLOSTOMY ;  Surgeon: Jayme Evans MD;  Location: [facility];  Service: General   • LIVER LOBECTOMY N/A 11/7/2022    Procedure: SEGMENT 3 LIVER RESECTION, ABLATION, INTRAOPERATIVE U/S OF LIVER, PERITONEAL BIOPSY;  Surgeon: George Ordonez MD;  Location: [facility];  Service: Surgical Oncology   • RIGHT COLON RESECTION N/A 11/7/2022    Procedure: EX LAP, TRANVERSE COLON RESECTION;  Surgeon: George Ordonez MD;  [facility];  Service: Surgical Oncology   • TONSILLECTOMY     • UMBILICAL HERNIA REPAIR LAPAROSCOPIC N/A 04/26/2019    Procedure: LAPAROSCOPIC UMBILICAL HERNIA REPAIR;  Surgeon: Jayme Evans MD;  [facility]: [facility];  Service: General   • VAC DRESSING APPLICATION N/A 33/31/2489    Procedure: APPLICATION VAC DRESSING ABDOMEN/TRUNK;  Surgeon: Carlos Leiva MD;  [facility]: [facility];  Service: Surgical Oncology         Allergies:      Allergies   Allergen Reactions   • Shellfish-Derived Products - Food Allergy Anaphylaxis   • Erythromycin GI Intolerance           LABORATORY RESULTS:      Lab Results   Component Value Date    HGB 8 3 (L) 12/05/2022    HGB 13 6 07/21/2017    HCT 27 7 (L) 12/05/2022    HCT 39 3 07/21/2017    WBC 12 98 (H) 12/05/2022    WBC 10 8 07/21/2017     Lab Results   Component Value Date    BUN 19 12/05/2022    BUN 21 09/26/2022     05/02/2017    K 3 8 12/05/2022    K 4 1 09/26/2022     (H) 12/05/2022     09/26/2022    GLUCOSE 151 (H) 11/16/2022    GLUCOSE 118 (H) 05/02/2017    CREATININE 1 23 12/05/2022    CREATININE 1 18 05/02/2017     Lab Results   Component Value Date    PROTIME 14 4 11/12/2022    INR 1 10 11/12/2022        DIAGNOSTIC STUDIES: Reviewed  No results found

## 2022-12-05 NOTE — UTILIZATION REVIEW
Initial Clinical Review    Admission: Date/Time/Statement:   Admission Orders (From admission, onward)     Ordered        12/04/22 0024  Place in Observation  Once                      Orders Placed This Encounter   Procedures   • Place in Observation     Standing Status:   Standing     Number of Occurrences:   1     Order Specific Question:   Level of Care     Answer:   Med Surg [16]       Initial Presentation: 61 y o  male who presents to [facility] from his SNF with abdominal pain  He was d/c'd on 12/3 following protracted surgical hospitalization to [facility]) and turned away d/t concerns about need for pain medication and management needs of his wound dressing changes   Currently he offers no complaints except intermittent abdominal pain  He is anxious  He has been tolerating diet with consistent stoma function  WBC 10 57  Admit under observation to M/S unit -- Continue wet to dry dressings to midline and pack the old stoma site  Observe off antibiotics and monitor leukocytosis  Reg diet  Per CM note: Per wife, SNF did not have pain meds to manage pain, said wound was infected and they could not provide care pt needed  States pt was sent back to the hospital and she was not notified by [facility] or   Leighann, requesting she be called with updates r/t patient  States pts brother was called instead of her  Discussed DCP  They would like  [facility], [facility], [facility]  Aware dependent on insurance approval, bed availability and pt ability to tolerate program   Referrals placed  Date: 12/5  Day 2:  No acute events overnight  Afebrile, hemodynamically stable  Tolerating diet, no n/v   Pain controlled  Ostomy productive  Continue diet as julia  Fingerstick glucose checks w/ ssi  Encourage ambulation and OOB  PRN analgesia  SCDs  Stoma care  Daily midline dressing changes with WTD by nursing        ED Triage Vitals   Temperature Pulse Respirations Blood Pressure SpO2   12/04/22 0015 12/04/22 0015 12/04/22 0015 12/04/22 0015 12/04/22 0015   99 7 °F (37 6 °C) (!) 114 16 159/92 93 %      Temp Source Heart Rate Source Patient Position - Orthostatic VS BP Location FiO2 (%)   12/04/22 0015 12/04/22 0015 12/04/22 2156 12/04/22 2156 --   Oral Monitor Lying Right arm       Pain Score       12/04/22 0041       8          Wt Readings from Last 1 Encounters:   11/24/22 114 kg (251 lb 8 7 oz)     Additional Vital Signs:   Date/Time Temp Pulse Resp BP MAP (mmHg) SpO2 Calculated FIO2 (%) - Nasal Cannula Nasal Cannula O2 Flow Rate (L/min) O2 Device Patient Position - Orthostatic VS   12/05/22 07:29:09 97 3 °F (36 3 °C) Abnormal  83 16 137/79 98 96 % -- -- -- --   12/05/22 07:27:54 97 3 °F (36 3 °C) Abnormal  84 -- 137/79 98 96 % -- -- -- --   12/05/22 0608 -- 85 17 -- -- 96 % 28 2 L/min Nasal cannula --   12/04/22 2300 -- 92 14 -- -- 96 % 28 2 L/min Nasal cannula --   12/04/22 2201 -- 101 19 -- -- 96 % 28 2 L/min Nasal cannula --   12/04/22 21:56:57 97 5 °F (36 4 °C) 105 17 132/95 107 96 % 28 2 L/min Nasal cannula Lying   12/04/22 17:53:35 -- -- -- 152/86 108 -- -- -- -- --   12/04/22 08:24:56 97 °F (36 1 °C) Abnormal  -- 17 146/87 107 -- -- -- None (Room air) --   12/04/22 00:15:08 99 7 °F (37 6 °C) 114 Abnormal  16 159/92 114 93 % -- -- -- --     Pertinent Labs/Diagnostic Test Results:   Results from last 7 days   Lab Units 12/04/22  0523 12/01/22  0832 11/29/22  0643   WBC Thousand/uL 17 51* 9 55 10 57*   HEMOGLOBIN g/dL 8 3* 8 1* 8 1*   HEMATOCRIT % 26 8* 25 6* 27 0*   PLATELETS Thousands/uL 428* 448* 415*   NEUTROS ABS Thousands/µL 14 34* 7 58 8 57*     Results from last 7 days   Lab Units 12/04/22  0542 12/01/22  0832 11/29/22  0643   SODIUM mmol/L 138 141 146   POTASSIUM mmol/L 4 5 3 7 3 9   CHLORIDE mmol/L 112* 115* 119*   CO2 mmol/L 19* 18* 22   ANION GAP mmol/L 7 8 5   BUN mg/dL 17 19 20   CREATININE mg/dL 1 16 0 95 1 13   EGFR ml/min/1 73sq m 69 87 71   CALCIUM mg/dL 8 1* 8 4 8 1*   MAGNESIUM mg/dL  --   --  2 1 PHOSPHORUS mg/dL  --   --  3 5     Results from last 7 days   Lab Units 12/05/22  0727 12/04/22  2111 12/04/22  1615 12/04/22  1153 12/04/22  0929 12/04/22  0824 12/04/22  0232 12/04/22  0038 12/03/22  1643 12/03/22  1155 12/03/22  0736 12/02/22  2256   POC GLUCOSE mg/dl 164* 213* 209* 217* 182* 169* 160* 159* 129 96 108 124     Results from last 7 days   Lab Units 12/04/22  0542 12/01/22  0832 11/29/22  0643   GLUCOSE RANDOM mg/dL 171* 153* 167*         Past Medical History:   Diagnosis Date   • Abdominal pain 03/12/2022   • Acute renal failure (HCC)     [id number] resolved   • Cancer ([facility])    • Diabetes mellitus ([place] [address] )    • Enteritis 08/23/2016   • Gastroparesis due to DM ([facility]) 08/23/2016   • GERD (gastroesophageal reflux disease)    • Hernia, ventral 08/04/2016   • Hyperlipidemia    • Hypertension    • Morbid obesity ([place] [address] ) 04/17/2018   • Postoperative visit 03/02/2022   • SIRS (systemic inflammatory response syndrome) ([facility]) 03/12/2022   • Snoring    • Stage 3a chronic kidney disease ([facility]) 02/19/2022     Present on Admission:  • Malignant neoplasm of transverse colon [facility])      Admitting Diagnosis: Altered bowel elimination due to intestinal ostomy ([place] [address] ) [K94 [id number]]  Age/Sex: 61 y o  male  Admission Orders:  Scheduled Medications:  acetaminophen, 975 mg, Oral, Q8H [address]  amLODIPine, 5 mg, Oral, BID  aspirin, 81 mg, Oral, Daily  atorvastatin, 40 mg, Oral, Daily  cholecalciferol, 400 Units, Oral, Daily  enoxaparin, 40 mg, Subcutaneous, BID  insulin glargine, 15 Units, Subcutaneous, HS  insulin lispro, 2-12 Units, Subcutaneous, 4 times day  lisinopril, 40 mg, Oral, Daily  methocarbamol, 500 mg, Oral, BID  multivitamin-minerals, 1 tablet, Oral, Daily  pantoprazole, 40 mg, Oral, BID AC    PRN Meds:  ondansetron, 4 mg, Oral, Q6H PRN  oxyCODONE, 10 mg, Oral, Q6H PRN  oxyCODONE, 5 mg, Oral, Q6H PRN          Network Utilization Review Department  ATTENTION: Please call with any questions or concerns to [phone] and carefully listen to the prompts so that you are directed to the right person  All voicemails are confidential   Suresh Petit all requests for admission clinical reviews, approved or denied determinations and any other requests to dedicated fax number below belonging to the campus where the patient is receiving treatment   List of dedicated fax numbers for the Facilities:  [address] DENIALS (Administrative/Medical Necessity) [phone]   [address] (Maternity/NICU/Pediatrics) [phone]   5 Ericka Pinto [phone]   [facility] N[address] 77 [phone]   [address] [address] [facility] 28 [phone]   1554 [facility] Janee Pete [facility] 134 815 [facility] [phone]

## 2022-12-05 NOTE — OCCUPATIONAL THERAPY NOTE
Occupational Therapy Evaluation     Patient Name: Rd DAMON Date: 12/5/2022  Problem List  Principal Problem:    Malignant neoplasm of transverse colon [facility])    Past Medical History  Past Medical History:   Diagnosis Date    Abdominal pain 03/12/2022    Acute renal failure ([place] [address] )     [id number] resolved    Cancer ([place] [address] )     Diabetes mellitus ([place] [address] )     Enteritis 08/23/2016    Gastroparesis due to DM ([place] [address] ) 08/23/2016    GERD (gastroesophageal reflux disease)     Hernia, ventral 08/04/2016    Hyperlipidemia     Hypertension     Morbid obesity ([place] [address] ) 04/17/2018    Postoperative visit 03/02/2022    SIRS (systemic inflammatory response syndrome) ([place] [address] ) 03/12/2022    Snoring     Stage 3a chronic kidney disease ([place] [address] ) 02/19/2022     Past Surgical History  Past Surgical History:   Procedure Laterality Date    COLONOSCOPY      COLOSTOMY N/A 02/20/2022    Procedure: COLOSTOMY LOOP, diverting;  Surgeon: Joann Argueta MD;  Location: [facility];  Service: General    ESOPHAGOGASTRODUODENOSCOPY N/A 08/24/2016    Procedure: ESOPHAGOGASTRODUODENOSCOPY (EGD); Surgeon: Cora Pike MD;  Location: [facility];   Service:     EXPLORATORY LAPAROTOMY W/ BOWEL RESECTION N/A 11/16/2022    Procedure: LAPAROTOMY EXPLORATORY W/ BOWEL RESECTION- VAC PLACEMENT;  Surgeon: Mary Reinoso MD;  Location: [facility] OR;  Service: Surgical Oncology    EXPLORATORY LAPAROTOMY W/ BOWEL RESECTION N/A 11/17/2022    Procedure: LAPAROTOMY EXPLORATORY W/ BOWEL RESECTION;  Surgeon: Mary Reinoso MD;  Location: [facility];  Service: Surgical Oncology    ILEOSTOMY N/A 11/17/2022    Procedure: ILEOSTOMY;  Surgeon: Mary Reinoso MD;  Location: [facility];  Service: Surgical Oncology    ILEOSTOMY CLOSURE N/A 11/7/2022    Procedure: REVERSAL COLOSTOMY;  Surgeon: Tony Dueñas MD;  Location: [facility];  Service: Surgical Oncology    IR PORT PLACEMENT  03/10/2022    KIDNEY STONE SURGERY      LIVER BIOPSY LAPAROSCOPIC N/A 02/20/2022 Procedure: DIAGNOSTIC LAPAROSCOPIC LIVER BIOPSY, DIVERTING LOOP COLOSTOMY ;  Surgeon: Nicola Mckeon MD;  Location: [facility];  Service: General    LIVER LOBECTOMY N/A 11/7/2022    Procedure: SEGMENT 3 LIVER RESECTION, ABLATION, INTRAOPERATIVE U/S OF LIVER, PERITONEAL BIOPSY;  Surgeon: Sapna Parmar MD;  Location: [facility];  Service: Surgical Oncology    RIGHT COLON RESECTION N/A 11/7/2022    Procedure: EX LAP, TRANVERSE COLON RESECTION;  Surgeon: Sapna Parmar MD;  Location: [facility];  Service: Surgical Oncology    TONSILLECTOMY      UMBILICAL HERNIA REPAIR LAPAROSCOPIC N/A 04/26/2019    Procedure: LAPAROSCOPIC UMBILICAL HERNIA REPAIR;  Surgeon: Nicola Mckeon MD;  [facility];  Service: General    VAC DRESSING APPLICATION N/A 17/73/9630    Procedure: APPLICATION VAC DRESSING ABDOMEN/TRUNK;  Surgeon: Debra Lara MD;  Location: [facility];  Service: Surgical Oncology         12/05/22 0938   OT Last Visit   OT Visit Date 12/05/22   Note Type   Note type Evaluation   Pain Assessment   Pain Assessment Tool 0-10   Pain Score 2   Pain Location/Orientation Location: Abdomen   Hospital Pain Intervention(s) Repositioned; Ambulation/increased activity; Emotional support   Restrictions/Precautions   Weight Bearing Precautions Per Order No   Other Precautions Chair Alarm; Bed Alarm;Multiple lines; Fall Risk;Pain   Home Living   Type of [address] [place] [address] Multi-level;1/2 bath on main level  (2STE)   Bathroom Shower/Tub Walk-in shower   Bathroom Toilet [address]  (did not use at baseline)   Additional Comments PTA, pt was d/c to STR  Above is home set up   Prior Function   Level of [place] Independent with ADLs; Independent with IADLS   Lives With Spouse;Daughter; Son   Becerril Hitch Help From [facility] in the last 6 months 0   Vocational On disability   Comments PTA, pt was d/c to STR   Above is baseline information   Lifestyle   Autonomy At baseline, pt was I with ADLs, IADLs, fnxl mobility, (+)    Reciprocal Relationships Wife, 2 kids   Service to Others SSD   Intrinsic Gratification Yardwork   ADL   Where Assessed Edge of bed   Eating Assistance 7  Independent   Grooming Assistance 7  Independent   UB Bathing Assistance 5  Supervision/Setup   LB Bathing Assistance 3  Moderate Assistance   UB Dressing Assistance 5  Supervision/Setup   LB Dressing Assistance 3  Moderate [address]  3  Moderate Assistance   Bed Mobility   Supine to Sit 5  Supervision   Additional items HOB elevated; Bedrails; Increased time required;LE management   Sit to Supine Unable to assess   Transfers   Sit to Stand 2  Maximal assistance   Additional items Assist x 2; Increased time required   Stand to Sit 3  Moderate assistance   Additional items Assist x 2; Increased time required;Verbal cues   Stand pivot 2  Maximal assistance   Additional items Assist x 2; Increased time required   Additional Comments Pt initially transfers 2x STS with MAX A x 1 and RW  Upon 2nd attempt, pt feet began sliding forward + knees buckling  This therapist safely lowered pt into a seated position onto the ground in a controlled fashion  Pt did not hit head or any additional body parts  With assist from PT, pt was then raised into a standing position with Ax2 and SPT into recliner     Balance   Static Sitting Fair +   Dynamic Sitting Fair   Static Standing Poor   Dynamic Standing Poor -   Ambulatory Poor -   Activity Tolerance   Activity Tolerance Patient limited by fatigue   Medical Staff Made Aware CM, RN   Nurse Made Aware RN clearance for session   RUE Assessment   RUE Assessment   (grossly 4-/5)   LUE Assessment   LUE Assessment   (grossly 4-/5)   Hand Function   Fine Motor Coordination Impaired   Sensation   Additional Comments 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   Vision-Basic Assessment   Current Vision Wears glasses all the time   Vision - Complex Assessment   Ocular Range of Motion Intact   Tracking Intact Saccades Intact   Psychosocial   Psychosocial (WDL) X   Patient Behaviors/Mood Anxious   Cognition   Overall Cognitive Status WFL   Arousal/Participation Responsive; Cooperative   Attention Attends with cues to redirect   Orientation Level Oriented to person;Oriented to time;Oriented to situation;Oriented to place  (generally to place)   Memory Unable to assess   Following Commands Follows one step commands with increased time or repetition   Comments Pt pleasant and cooperative t/o session  Very motivated to regain his independence  Anxious/emotional/fear of not regaining strength  Emotional support provided   Assessment   Limitation Decreased ADL status; Decreased UE ROM; Decreased UE strength;Decreased cognition;Decreased endurance;Decreased self-care trans;Decreased high-level ADLs; Decreased fine motor control   Prognosis Good   Assessment Pt is a 61 y o  male [facility]22 w/ Malignant neoplasm of transverse colon ([facility] [address] )  has a past medical history of Abdominal pain, Acute renal failure, Cancer, Diabetes mellitus, Enteritis,  Transaminitis, Encephalopathy, Bacteremia, ESBL, Acute respiratory failure with hypoxia, Gastroparesis, GERD, Hernia, Hyperlipidemia, Hypertension, Morbid obesity, SIRS, and CKD  Pt with active OT orders and up and OOB as tolerated orders  At baseline, pt resides with his wife and 2 kids in a 3STH, 2STE  Pt was I w/  ADLS and IADLS, (+) drove  Upon evaluation, pt currently requires MAX A x 1-2 for transfers  Pt with controlled lower to the ground, see FS for detailed information  Pt currently requires I eating, I grooming, S UB ADLs, MOD A LB ADLs, and MOD A toileting  Pt is limited at this time 2*: endurance, activity tolerance, functional mobility, balance, functional standing tolerance, decreased I w/ ADLS/IADLS, strength and cognitive impairments  The following Occupational Performance Areas to address include: grooming, bathing/shower, toilet hygiene, dressing, health maintenance, functional mobility, community mobility and clothing management  Pt to benefit from immediate acute skilled OT to address above deficits, improve overall functional independence, maximize fnxl mobility and reduce caregiver burden  From OT standpoint, recommendation at time of d/c would be STR  Pt was left after session with all current needs met  The patient's raw score on the AM-PAC Daily Activity inpatient short form is 17, standardized score is 37 26, less than 39 4  Patients at this level are likely to benefit from discharge to post-acute rehabilitation services  Please refer to the recommendation of the Occupational Therapist for safe discharge planning  Goals   Patient Goals to be able to walk to the [facility] Time Frame 10-14   Plan   Treatment Interventions ADL retraining;Functional transfer training; Endurance training;UE strengthening/ROM; Cognitive reorientation;Patient/family training;Equipment evaluation/education; Fine motor coordination activities; Compensatory technique education;Continued evaluation; Activityengagement; Energy conservation   Goal Expiration Date 12/19/22   OT Frequency   (2-4x/wk)   Recommendation   OT Discharge Recommendation Post acute rehabilitation services   AM-PAC Daily Activity Inpatient   Lower Body Dressing 2   Bathing 2   Toileting 2   Upper Body Dressing 3   Grooming 4   Eating 4   Daily Activity Raw Score 17   Daily Activity Standardized Score (Calc for Raw Score >=11) 37 26   AM-PAC Applied Cognition Inpatient   Following a Speech/Presentation 3   Understanding Ordinary Conversation 4   Taking Medications 4   Remembering Where Things Are Placed or Put Away 4   Remembering List of 4-5 Errands 3   Taking Care of Complicated Tasks 3   Applied Cognition Raw Score 21   Applied Cognition Standardized Score 44 3     GOALS    - Pt will complete UB dressing/self care/bathing w/ MI using adaptive device and DME as needed    - Pt will complete LB dressing/self care/bathing w/ S using adaptive device and DME as needed    - Pt will complete toileting w/ S w/ G hygiene/thoroughness using DME as needed    - Pt will improve functional transfers to MIN A on/off all surfaces using DME as needed w/ G balance/safety     - Pt will improve functional mobility during ADL/IADL/leisure tasks to MIN A using DME as needed w/ G balance/safety     - Pt will demonstrate G carryover of pt/caregiver education and training as appropriate      - Pt will demonstrate 100% carryover of energy conservation techniques t/o functional I/ADL/leisure tasks w/o cues s/p skilled education    - Pt will engage in ongoing cognitive assessment w/ G participation to assist w/ safe d/c planning/recommendations    - Pt will increase static and dynamic standing/sitting balance to F using AD/DME as needed to increase safety and I during fnxl transfers and ADLs    - Pt will maintain upright sitting position for at least 20 min during dynamic fnxl activity with F balance and endurance to improve ADL performance and independence, as well as reduce risk of falls     - Pt will participate in simulated IADL management task with DME as needed to increase independence to  w/ G safety and endurance    Andrea Locke MS, OTR/L

## 2022-12-05 NOTE — CASE MANAGEMENT
Case Management Discharge Planning Note    Patient name Tammy Burnett  Location [facility] 906/[facility] 780-70 MRN [id number]  : 1965 Date 2022       Current Admission Date: 2022  Current Admission Diagnosis:Malignant neoplasm of transverse colon [facility])   Patient Active Problem List    Diagnosis Date Noted   • Flash pulmonary edema ([facility] [address] ) 2022   • MR (mitral regurgitation) 2022   • Bacteremia 2022   • ESBL (extended spectrum beta-lactamase) producing bacteria infection 2022   • Acute respiratory failure with hypoxia ([facility] [address] ) 2022   • Encephalopathy 2022   • Cervical radiculopathy 10/26/2022   • Other fatigue 06/15/2022   • Colostomy prolapse ([facility] [address] ) 2022   • Colon cancer metastasized to liver ([facility] [address] ) 2022   • 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 liver ([facility] ) 2022   • Iron deficiency anemia, unspecified 2022   • Malignant neoplasm of transverse colon ([facility] ) 2022   • Thrombocytosis 2022   • Hypokalemia 2022   • Transaminitis 2022   • Type 2 diabetes mellitus with hyperlipidemia ([facility] [address] ) 2022   • Colonic mass 2022   • Microcytic anemia 2022   • Left ureteral calculus 2020   • Incarcerated umbilical hernia    • Testicular hypogonadism 2017   • Low testosterone 2017   • Type 2 diabetes mellitus without complication, with long-term current use of insulin ([facility] ) 2016   • Benign essential hypertension 2016   • Mixed hyperlipidemia 2016   • Erectile dysfunction 2016   • Obesity (BMI 30-39 9) 2016      LOS (days): 0  Geometric Mean LOS (GMLOS) (days):   Days to GMLOS:     OBJECTIVE:            Current admission status: Observation   Preferred Pharmacy:   [facility]/pharmacy #[id number] CLOSED - [place], [place] - [address]  [place] [address]  Phone: [phone] Fax: [phone] - [place], [address] [facility] [address] [facility][1] [address]  Phone: [phone] Fax: [phone]    [facility]/pharmacy #[id number] ANJELICA COBOS - [address] [facility] [address]  Phone: [phone] Fax: [phone]    Primary Care Provider: Aliyah Mccoy MD    Primary Insurance: [employer]  Secondary Insurance:     DISCHARGE DETAILS:         Other Referral/Resources/Interventions Provided:  Referral Comments: PMR consulted  CM spoke with PMR physician who stated that they are accepting the patient for acute level of rehab pending insurance authorization  CM spoke with family as both [facility] and [facility] have accepted the patient  At this time the preference is for the patient to remain with [facility] to submit for insurance authorization and follow up with JIMENA

## 2022-12-05 NOTE — CASE MANAGEMENT
Gama Whitlock 49 has received approved authorization from:   Cristel Moyer 20 received for: SNF  Facility: [facility] #: [id number]  Start of Care: 12/3  Next Review Date: 12/11  Care Coordinator: Lalo ELE#: [phone] [id number]  Submit next review to: [address] [place] [address] notified: Jennifer Max

## 2022-12-05 NOTE — WOUND OSTOMY CARE
Progress Note- Ostomy  Marta Ahumada 61 y o  male  [id number]  [facility] 906-[facility] 906-01    Assessment:  61 M with metastatic colonic adenocarcinoma, recently discharged from prolonged surgical admission culminating in partial colectomy with diverting ileostomy c/b intra-abdominal abscess  According to H&P patient returned from SNF due to concerns for ability to manage patient  Patient reports knowledge and ability to change pouch from previous ostomy surgery but has concerns since this stoma is in a new place and involves a new aspect of the valve  Pouch was leaking and asked for assistance with management  Requested ostomy teaching/ "refresher" with new stoma site  Patient reports neuropathy in hands  Patient tearful during visit related to medical condition  Topics reviewed: normal stoma appearance, how and when to empty (1/3 full) to prevent pulling/lifting of the barrier, importance & how to clean the end of the pouch to prevent odor, gas/odor producing foods, frequency of changing of the pouch (every 3-4 days on a schedule), burping, one piece/two piece pouching systems differences, open and closed end pouches, gas valve functionality of one piece, Clothing- including avoiding placing belt-line/seat belts over the stoma, bathing, james-stomal skin care , and how to obtain supplies  Stress importance with hydration and proper nutrition  Step-by-step pouch change done using a Convatec One Piece pouch  Reviewed with patient how and when to measure the stoma (weekly)  Demonstrated how to mold two piece barrier/ trace & cut one piece barrier, and how to apply it to the skin using gentle pressure / warmth of the hands  Skin prep applied to james-stomal skin, rationale explained to patient  Good seal obtained  Addtional supplies left at bedside  Patient was able to remove pouch, cleanse peristomal skin, and close the pouch with no assistance from staff       Stoma appearance:         Stoma: Red, circular, budded stoma with distal  Os producing stool, peristomal skin is intact  Stoma attached to skin junction, Small seperation noted on inner aspect of stoma  Moderate amount of brown semi formed effluent in bag  STOMA MEASUREMENT: 4 5 cms x 4 5cms      Ostomy Care:  -Stoma Measurement: 4 5 x 4 5 cm (Measure stoma weekly for next 6-8 weeks- stoma will decrease in size due to decrease in swelling)     -Appliance Used During Bag Change: Convatec One Piece Pouch  -Accessories Used: 3M No Sting Skin Barrier    *Can shower with pouch on or off  Make sure to dry off pouch after shower  Bag Change Steps:  1  Peel back pouch using push-pull method, may use non-alcohol adhesive remover  Remove pouch from top to bottom  2  Use warm water only to cleanse skin around the stoma (james-stomal skin)  3  Make sure all adhesive residue is removed and skin is dry and not oily  4  Measure stoma size using measuring guide and trace correct measurements onto back of pouch  5  Then cut or mold the backing of pouch out to correct shape/size  6  Use 3M no sting barrier film to prep the skin around the stoma  (If the skin is open, moist or fragile, Crusting can be done now to create dry skin and assist with pouch adherence- steps are listed below)  7  Place pouch over stoma and onto skin  8  Use warmth of hand to apply gentle pressure to help backing of pouch to adhere well to skin  TIPS:  Empty pouch when 1/3 -1/2 full  Change pouch every 4-5 days or if signs of leaking  Crusting as needed for moist, red, open skin around the stoma: (Done prior to pouch application)   Apply stoma powder to excoriation, move excess powder away with hand  Use no sting barrier to pat area to form "crust"  Repeat X2 before placing new ostomy pouch     Orders listed below and wound care will continue to follow, call or tiger text with questions  Bedside nurse updated of findings and orders   Flowsheets below with pictures and measurements        Rusty Starr RN, BSN

## 2022-12-05 NOTE — PLAN OF CARE
Problem: OCCUPATIONAL THERAPY ADULT  Goal: Performs self-care activities at highest level of function for planned discharge setting  See evaluation for individualized goals  Description: Treatment Interventions: ADL retraining, Functional transfer training, Endurance training, UE strengthening/ROM, Cognitive reorientation, Patient/family training, Equipment evaluation/education, Fine motor coordination activities, Compensatory technique education, Continued evaluation, Activityengagement, Energy conservation          See flowsheet documentation for full assessment, interventions and recommendations  Note: Limitation: Decreased ADL status, Decreased UE ROM, Decreased UE strength, Decreased cognition, Decreased endurance, Decreased self-care trans, Decreased high-level ADLs, Decreased fine motor control  Prognosis: Good  Assessment: Pt is a 61 y o  male admitted to [facility] on 12/4/2022 w/ Malignant neoplasm of transverse colon ([facility] [address] )  has a past medical history of Abdominal pain, Acute renal failure, Cancer, Diabetes mellitus, Enteritis,  Transaminitis, Encephalopathy, Bacteremia, ESBL, Acute respiratory failure with hypoxia, Gastroparesis, GERD, Hernia, Hyperlipidemia, Hypertension, Morbid obesity, SIRS, and CKD  Pt with active OT orders and up and OOB as tolerated orders  At baseline, pt resides with his wife and 2 kids in a 3STH, 2STE  Pt was I w/  ADLS and IADLS, (+) drove  Upon evaluation, pt currently requires MAX A x 1-2 for transfers  Pt with controlled lower to the ground, see FS for detailed information  Pt currently requires I eating, I grooming, S UB ADLs, MOD A LB ADLs, and MOD A toileting  Pt is limited at this time 2*: endurance, activity tolerance, functional mobility, balance, functional standing tolerance, decreased I w/ ADLS/IADLS, strength and cognitive impairments  The following Occupational Performance Areas to address include: grooming, bathing/shower, toilet hygiene, dressing, health maintenance, functional mobility, community mobility and clothing management  Pt to benefit from immediate acute skilled OT to address above deficits, improve overall functional independence, maximize fnxl mobility and reduce caregiver burden  From OT standpoint, recommendation at time of d/c would be STR  Pt was left after session with all current needs met  The patient's raw score on the AM-PAC Daily Activity inpatient short form is 17, standardized score is 37 26, less than 39 4  Patients at this level are likely to benefit from discharge to post-acute rehabilitation services  Please refer to the recommendation of the Occupational Therapist for safe discharge planning       OT Discharge Recommendation: Post acute rehabilitation services

## 2022-12-05 NOTE — PROGRESS NOTES
Progress Note - Surgical Oncology  Severo Herr 61 y o  male MRN: [id number]  Unit/Bed#: [facility] 906-01 Encounter: [id number]      Assessment:  61 y o  male with metastatic colonic adenocarcinoma, recently discharged from prolonged surgical admission culminating in partial colectomy with diverting ileostomy c/b intra-abdominal abscess  Now returning from SNF due to concerns for ability to manage patient  Stable     Ostomy: 650  UOP: 1300    Plan:  - Diet as tolerated  - Glycemic control  - Encourage ambulation and OOB  - PRN analgesia  - DVT ppx  - Stoma care  - Daily midline dressing changes with WTD by nursing  - Dispo planning  Subjective: No acute events overnight  Afebrile, hemodynamically stable  Tolerating diet  No nausea, or vomiting, fevers or chills  Pain controlled  Ostomy productive  Objective:   Temp:  [97 °F (36 1 °C)-97 5 °F (36 4 °C)] 97 5 °F (36 4 °C)  HR:  [] 92  Resp:  [14-17] 14  BP: (132-152)/(86-95) 132/95    Physical Exam:  General: No acute distress, alert and oriented  CV: Well perfused, regular rate and rhythm  Lungs: Normal work of breathing, no increased respiratory effort  Abdomen: Soft, non-tender, non-distended  Incision with WTD packing in place  Ostomy: Pink, patent, productive  Extremities: No edema, clubbing or cyanosis  Skin: Warm, dry      I/O       12/03 0701  12/04 0700 12/04 0701  12/05 0700    Urine  1300    Stool  650    Total Output  1950    Net  -1950          Unmeasured Urine Occurrence  1 x          Lab, Imaging and other studies: I have personally reviewed pertinent reports    , CBC with diff: No results found for: WBC, HGB, HCT, MCV, PLT, ADJUSTEDWBC, MCH, MCHC, RDW, MPV, NRBC, BMP/CMP: No results found for: SODIUM, K, CL, CO2, ANIONGAP, BUN, CREATININE, GLUCOSE, CALCIUM, AST, ALT, ALKPHOS, PROT, BILITOT, EGFR          Ulises Mccarthy MD  12/5/2022 6:18 AM

## 2022-12-05 NOTE — PROGRESS NOTES
Patient's abdominal wound assessed by white sx resident  New dressing and packing applied  See orders for details  Patient tolerated well

## 2022-12-05 NOTE — PLAN OF CARE
Problem: PAIN - ADULT  Goal: Verbalizes/displays adequate comfort level or baseline comfort level  Description: Interventions:  - Encourage patient to monitor pain and request assistance  - Assess pain using appropriate pain scale  - Administer analgesics based on type and severity of pain and evaluate response  - Implement non-pharmacological measures as appropriate and evaluate response  - Consider cultural and social influences on pain and pain management  - Notify physician/advanced practitioner if interventions unsuccessful or patient reports new pain  Outcome: Progressing     Problem: SAFETY ADULT  Goal: Patient will remain free of falls  Description: INTERVENTIONS:  - Educate patient/family on patient safety including physical limitations  - Instruct patient to call for assistance with activity   - Consult OT/PT to assist with strengthening/mobility   - Keep Call bell within reach  - Keep bed low and locked with side rails adjusted as appropriate  - Keep care items and personal belongings within reach  - Initiate and maintain comfort rounds  - Make Fall Risk Sign visible to staff  - Apply yellow socks and bracelet for high fall risk patients  - Consider moving patient to room near nurses station  Outcome: Progressing     Problem: Potential for Falls  Goal: Patient will remain free of falls  Description: INTERVENTIONS:  - Educate patient/family on patient safety including physical limitations  - Instruct patient to call for assistance with activity   - Consult OT/PT to assist with strengthening/mobility   - Keep Call bell within reach  - Keep bed low and locked with side rails adjusted as appropriate  - Keep care items and personal belongings within reach  - Initiate and maintain comfort rounds  - Make Fall Risk Sign visible to staff  - Apply yellow socks and bracelet for high fall risk patients  - Consider moving patient to room near nurses station  Outcome: Progressing

## 2022-12-05 NOTE — DISCHARGE INSTR - OTHER ORDERS
Ostomy Care:  -Stoma Measurement: 4 5 x 4 5 cm (Measure stoma weekly for next 6-8 weeks- stoma will decrease in size due to decrease in swelling)     -Appliance Used During Bag Change: Convatec One Piece Pouch or Coloplast one piece high out-put pouch  -Accessories Used: 3M No Sting Skin Barrier    *Can shower with pouch on or off  Make sure to dry off pouch after shower  Bag Change Steps:  1  Peel back pouch using push-pull method, may use non-alcohol adhesive remover  2  Use warm water only to cleanse skin around the stoma (james-stomal skin)  3  Make sure all adhesive residue is removed and skin is dry and not oily  4  Measure stoma size using measuring guide and trace correct measurements onto back of pouch (Off set opening away from abdominal incision)  5  Then cut backing of pouch out to correct shape/size  6  Place piece of Maxorb Alginate over wound on abdomen above stoma prior to applying pouch  7  Place pouch over stoma and onto skin  8  Use warmth of hand to apply gentle pressure for about 3-6 minutes to help backing of pouch to adhere well to skin  TIPS:  Empty pouch when 1/3 -1/2 full  Change pouch every 4-5 days or if signs of leaking  Crusting as needed for moist, red, open skin around the stoma: (Done prior to pouch application)   Apply stoma powder to excoriation, move excess powder away with hand  Use no sting barrier to pat area to form "crust"  Repeat X2 before placing new ostomy pouch     MIDLINE INCISION PACKING  Question Answer   Site Anterior   Site Abdomen   Cleanse Soap & Water   Wound Care Mesalt Packing   Specify type: Ribbon   Apply Abd Pad   Open to air: No   Other wound care instructions Pack midline wound with mesalt packing, cover with ABD pads, secure with tape  NO PACKING IN RLQ OLD OSTOMY SITE

## 2022-12-05 NOTE — CASE MANAGEMENT
Case Management Discharge Planning Note    Patient name Satish Qiu  Location [facility] 490-77 MRN [id number]  : 1965 Date 2022       Current Admission Date: 2022  Current Admission Diagnosis:Malignant neoplasm of transverse colon [facility])   Patient Active Problem List    Diagnosis Date Noted   • Flash pulmonary edema ([facility] ) 2022   • MR (mitral regurgitation) 2022   • Bacteremia 2022   • ESBL (extended spectrum beta-lactamase) producing bacteria infection 2022   • Acute respiratory failure with hypoxia ([facility] ) 2022   • Encephalopathy 2022   • Cervical radiculopathy 10/26/2022   • Other fatigue 06/15/2022   • Colostomy prolapse ([facility] ) 2022   • Colon cancer metastasized to liver ([facility] ) 2022   • 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 liver ([facility] ) 2022   • Iron deficiency anemia, unspecified 2022   • Malignant neoplasm of transverse colon ([facility] ) 2022   • Thrombocytosis 2022   • Hypokalemia 2022   • Transaminitis 2022   • Type 2 diabetes mellitus with hyperlipidemia ([facility] ) 2022   • Colonic mass 2022   • Microcytic anemia 2022   • Left ureteral calculus 2020   • Incarcerated umbilical hernia    • Testicular hypogonadism 2017   • Low testosterone 2017   • Type 2 diabetes mellitus without complication, with long-term current use of insulin ([facility] ) 2016   • Benign essential hypertension 2016   • Mixed hyperlipidemia 2016   • Erectile dysfunction 2016   • Obesity (BMI 30-39 9) 2016      LOS (days): 0  Geometric Mean LOS (GMLOS) (days):   Days to GMLOS:     OBJECTIVE:            Current admission status: Observation   Preferred Pharmacy:   [facility]/pharmacy #[id number] [place], [place] - [address] [address]  Phone: [phone] Fax: [phone] - BILL, [address] [place] [address] [address] [facility] [address]  Phone: [phone] Fax: [phone]    [facility]/pharmacy #[id number] [place] - [address] [facility] [address]  Phone: [phone] Fax: [phone]    Primary Care Provider: Lawyer Eduardo MD    [facility]  Secondary Insurance:     [address] [place] [address] Number: [id number]

## 2022-12-05 NOTE — UTILIZATION REVIEW
NOTIFICATION OF ADMISSION DISCHARGE   This is a Notification of Discharge from [address]  Please be advised that this patient has been discharge from our facility  Below you will find the admission and discharge date and time including the patient’s disposition  UTILIZATION REVIEW CONTACT:  Jen Hernandez  Utilization   Network Utilization Review Department  Phone: [phone] x carefully listen to the prompts  All voicemails are confidential   Email: [email or web address]     ADMISSION INFORMATION  PRESENTATION DATE: 11/7/2022  5:15 AM  OBERVATION ADMISSION DATE:   INPATIENT ADMISSION DATE: 11/7/22 11:27 AM   DISCHARGE DATE: 12/3/2022  6:45 PM   DISPOSITION:Non [facility] SNF/TCU/SNU    IMPORTANT INFORMATION:  Send all requests for admission clinical reviews, approved or denied determinations and any other requests to dedicated fax number below belonging to the campus where the patient is receiving treatment   List of dedicated fax numbers:  [address] DENIALS (Administrative/Medical Necessity) [phone]   [address] (Maternity/NICU/Pediatrics) [phone]   [facility] [phone]   [place] [address] [phone]   [address] [phone]   220 [facility] [phone] [facility] [phone]   [address] [place] [address] 119 [phone]   [facility]  [phone]   [address] [facility] [phone] 412 [facility] [address] [facility] [phone]

## 2022-12-05 NOTE — PLAN OF CARE
Problem: PAIN - ADULT  Goal: Verbalizes/displays adequate comfort level or baseline comfort level  Description: Interventions:  - Encourage patient to monitor pain and request assistance  - Assess pain using appropriate pain scale  - Administer analgesics based on type and severity of pain and evaluate response  - Implement non-pharmacological measures as appropriate and evaluate response  - Consider cultural and social influences on pain and pain management  - Notify physician/advanced practitioner if interventions unsuccessful or patient reports new pain  Outcome: Progressing     Problem: INFECTION - ADULT  Goal: Absence or prevention of progression during hospitalization  Description: INTERVENTIONS:  - Assess and monitor for signs and symptoms of infection  - Monitor lab/diagnostic results  - Monitor all insertion sites, i e  indwelling lines, tubes, and drains  - Monitor endotracheal if appropriate and nasal secretions for changes in amount and color  - [place] appropriate cooling/warming therapies per order  - Administer medications as ordered  - Instruct and encourage patient and family to use good hand hygiene technique  - Identify and instruct in appropriate isolation precautions for identified infection/condition  Outcome: Progressing  Goal: Absence of fever/infection during neutropenic period  Description: INTERVENTIONS:  - Monitor WBC    Outcome: Progressing     Problem: SAFETY ADULT  Goal: Patient will remain free of falls  Description: INTERVENTIONS:  - Educate patient/family on patient safety including physical limitations  - Instruct patient to call for assistance with activity   - Consult OT/PT to assist with strengthening/mobility   - Keep Call bell within reach  - Keep bed low and locked with side rails adjusted as appropriate  - Keep care items and personal belongings within reach  - Initiate and maintain comfort rounds  - Make Fall Risk Sign visible to staff  - Offer Toileting every 3 Hours, in advance of need  - Initiate/Maintain 3alarm  - Obtain necessary fall risk management equipment: 3  - Apply yellow socks and bracelet for high fall risk patients  - Consider moving patient to room near nurses station  Outcome: Progressing  Goal: Maintain or return to baseline ADL function  Description: INTERVENTIONS:  -  Assess patient's ability to carry out ADLs; assess patient's baseline for ADL function and identify physical deficits which impact ability to perform ADLs (bathing, care of mouth/teeth, toileting, grooming, dressing, etc )  - Assess/evaluate cause of self-care deficits   - Assess range of motion  - Assess patient's mobility; develop plan if impaired  - Assess patient's need for assistive devices and provide as appropriate  - Encourage maximum independence but intervene and supervise when necessary  - Involve family in performance of ADLs  - Assess for home care needs following discharge   - Consider OT consult to assist with ADL evaluation and planning for discharge  - Provide patient education as appropriate  Outcome: Progressing  Goal: Maintains/Returns to pre admission functional level  Description: INTERVENTIONS:  - Perform BMAT or MOVE assessment daily    - Set and communicate daily mobility goal to care team and patient/family/caregiver  - Collaborate with rehabilitation services on mobility goals if consulted  - Perform Range of Motion 3 times a day  - Reposition patient every 3 hours    - Dangle patient 3 times a day  - Stand patient 3 times a day  - Ambulate patient 3 times a day  - Out of bed to chair 33 times a day   - Out of bed for meals 3 times a day  - Out of bed for toileting  - Record patient progress and toleration of activity level   Outcome: Progressing     Problem: DISCHARGE PLANNING  Goal: Discharge to home or other facility with appropriate resources  Description: INTERVENTIONS:  - Identify barriers to discharge w/patient and caregiver  - Arrange for needed discharge resources and transportation as appropriate  - Identify discharge learning needs (meds, wound care, etc )  - Arrange for interpretive services to assist at discharge as needed  - Refer to Case Management Department for coordinating discharge planning if the patient needs post-hospital services based on physician/advanced practitioner order or complex needs related to functional status, cognitive ability, or social support system  Outcome: Progressing     Problem: Knowledge Deficit  Goal: Patient/family/caregiver demonstrates understanding of disease process, treatment plan, medications, and discharge instructions  Description: Complete learning assessment and assess knowledge base    Interventions:  - Provide teaching at level of understanding  - Provide teaching via preferred learning methods  Outcome: Progressing     Problem: SKIN/TISSUE INTEGRITY - ADULT  Goal: Skin Integrity remains intact(Skin Breakdown Prevention)  Description: Assess:  -Perform Michael assessment every 3  -Clean and moisturize skin every 3  -Inspect skin when repositioning, toileting, and assisting with ADLS  -Assess under medical devices such as 3 every 3  -Assess extremities for adequate circulation and sensation     Bed Management:  -Have minimal linens on bed & keep smooth, unwrinkled  -Change linens as needed when moist or perspiring  -Avoid sitting or lying in one position for more than 3 hours while in bed  -Keep HOB at 3degrees     Toileting:  -Offer bedside commode  -Assess for incontinence every 3  -Use incontinent care products after each incontinent episode such as 3    Activity:  -Mobilize patient 3 times a day  -Encourage activity and walks on unit  -Encourage or provide ROM exercises   -Turn and reposition patient every 3 Hours  -Use appropriate equipment to lift or move patient in bed  -Instruct/ Assist with weight shifting every 3 when out of bed in chair  -Consider limitation of chair time 3 hour intervals    Skin Care:  -Avoid use of baby powder, tape, friction and shearing, hot water or constrictive clothing  -Relieve pressure over bony prominences using 3  -Do not massage red bony areas    Next Steps:  -Teach patient strategies to minimize risks such as 3   -Consider consults to  interdisciplinary teams such as 3  Outcome: Progressing  Goal: Incision(s), wounds(s) or drain site(s) healing without S/S of infection  Description: INTERVENTIONS  - Assess and document dressing, incision, wound bed, drain sites and surrounding tissue  - Provide patient and family education  - Perform skin care/dressing changes every 3  Outcome: Progressing  Goal: Pressure injury heals and does not worsen  Description: Interventions:  - Implement low air loss mattress or specialty surface (Criteria met)  - Apply silicone foam dressing  - Instruct/assist with weight shifting every 3 minutes when in chair   - Limit chair time to 3 hour intervals  - Use special pressure reducing interventions such as 3 when in chair   - Apply fecal or urinary incontinence containment device   - Perform passive or active ROM every 3  - Turn and reposition patient & offload bony prominences every 3 hours   - Utilize friction reducing device or surface for transfers   - Consider consults to  interdisciplinary teams such as 3  - Use incontinent care products after each incontinent episode such as 3  - Consider nutrition services referral as needed  Outcome: Progressing     Problem: Prexisting or High Potential for Compromised Skin Integrity  Goal: Skin integrity is maintained or improved  Description: INTERVENTIONS:  - Identify patients at risk for skin breakdown  - Assess and monitor skin integrity  - Assess and monitor nutrition and hydration status  - Monitor labs   - Assess for incontinence   - Turn and reposition patient  - Assist with mobility/ambulation  - Relieve pressure over bony prominences  - Avoid friction and shearing  - Provide appropriate hygiene as needed including keeping skin clean and dry  - Evaluate need for skin moisturizer/barrier cream  - Collaborate with interdisciplinary team   - Patient/family teaching  - Consider wound care consult   Outcome: Progressing     Problem: MOBILITY - ADULT  Goal: Maintain or return to baseline ADL function  Description: INTERVENTIONS:  -  Assess patient's ability to carry out ADLs; assess patient's baseline for ADL function and identify physical deficits which impact ability to perform ADLs (bathing, care of mouth/teeth, toileting, grooming, dressing, etc )  - Assess/evaluate cause of self-care deficits   - Assess range of motion  - Assess patient's mobility; develop plan if impaired  - Assess patient's need for assistive devices and provide as appropriate  - Encourage maximum independence but intervene and supervise when necessary  - Involve family in performance of ADLs  - Assess for home care needs following discharge   - Consider OT consult to assist with ADL evaluation and planning for discharge  - Provide patient education as appropriate  Outcome: Progressing  Goal: Maintains/Returns to pre admission functional level  Description: INTERVENTIONS:  - Perform BMAT or MOVE assessment daily    - Set and communicate daily mobility goal to care team and patient/family/caregiver  - Collaborate with rehabilitation services on mobility goals if consulted  - Perform Range of Motion 3 times a day  - Reposition patient every 3 hours    - Dangle patient 3 times a day  - Stand patient 3 times a day  - Ambulate patient 3 times a day  - Out of bed to chair 3 times a day   - Out of bed for meals 3 times a day  - Out of bed for toileting  - Record patient progress and toleration of activity level   Outcome: Progressing     Problem: Potential for Falls  Goal: Patient will remain free of falls  Description: INTERVENTIONS:  - Educate patient/family on patient safety including physical limitations  - Instruct patient to call for assistance with activity   - Consult OT/PT to assist with strengthening/mobility   - Keep Call bell within reach  - Keep bed low and locked with side rails adjusted as appropriate  - Keep care items and personal belongings within reach  - Initiate and maintain comfort rounds  - Make Fall Risk Sign visible to staff  - Offer Toileting every 3 Hours, in advance of need  - Initiate/Maintain 3alarm  - Obtain necessary fall risk management equipment: 3  - Apply yellow socks and bracelet for high fall risk patients  - Consider moving patient to room near nurses station  Outcome: Progressing

## 2022-12-06 ENCOUNTER — APPOINTMENT (OUTPATIENT)
Dept: RADIOLOGY | Facility: HOSPITAL | Age: 57
End: 2022-12-06

## 2022-12-06 LAB
ANION GAP SERPL CALCULATED.3IONS-SCNC: 6 MMOL/L (ref 4–13)
ANION GAP SERPL CALCULATED.3IONS-SCNC: 7 MMOL/L (ref 4–13)
BUN SERPL-MCNC: 18 MG/DL (ref 5–25)
BUN SERPL-MCNC: 18 MG/DL (ref 5–25)
CALCIUM SERPL-MCNC: 8.2 MG/DL (ref 8.3–10.1)
CALCIUM SERPL-MCNC: 8.5 MG/DL (ref 8.3–10.1)
CHLORIDE SERPL-SCNC: 110 MMOL/L (ref 96–108)
CHLORIDE SERPL-SCNC: 110 MMOL/L (ref 96–108)
CO2 SERPL-SCNC: 18 MMOL/L (ref 21–32)
CO2 SERPL-SCNC: 20 MMOL/L (ref 21–32)
CREAT SERPL-MCNC: 1.25 MG/DL (ref 0.6–1.3)
CREAT SERPL-MCNC: 1.33 MG/DL (ref 0.6–1.3)
ERYTHROCYTE [DISTWIDTH] IN BLOOD BY AUTOMATED COUNT: 16.1 % (ref 11.6–15.1)
FLUAV RNA RESP QL NAA+PROBE: NEGATIVE
FLUBV RNA RESP QL NAA+PROBE: NEGATIVE
GFR SERPL CREATININE-BSD FRML MDRD: 58 ML/MIN/1.73SQ M
GFR SERPL CREATININE-BSD FRML MDRD: 63 ML/MIN/1.73SQ M
GLUCOSE SERPL-MCNC: 125 MG/DL (ref 65–140)
GLUCOSE SERPL-MCNC: 130 MG/DL (ref 65–140)
GLUCOSE SERPL-MCNC: 172 MG/DL (ref 65–140)
GLUCOSE SERPL-MCNC: 195 MG/DL (ref 65–140)
GLUCOSE SERPL-MCNC: 201 MG/DL (ref 65–140)
GLUCOSE SERPL-MCNC: 207 MG/DL (ref 65–140)
HCT VFR BLD AUTO: 33.6 % (ref 36.5–49.3)
HGB BLD-MCNC: 10.5 G/DL (ref 12–17)
MCH RBC QN AUTO: 27.1 PG (ref 26.8–34.3)
MCHC RBC AUTO-ENTMCNC: 31.3 G/DL (ref 31.4–37.4)
MCV RBC AUTO: 87 FL (ref 82–98)
PHOSPHATE SERPL-MCNC: 3.5 MG/DL (ref 2.7–4.5)
PLATELET # BLD AUTO: 361 THOUSANDS/UL (ref 149–390)
PMV BLD AUTO: 10 FL (ref 8.9–12.7)
POTASSIUM SERPL-SCNC: 3.8 MMOL/L (ref 3.5–5.3)
POTASSIUM SERPL-SCNC: 4.1 MMOL/L (ref 3.5–5.3)
RBC # BLD AUTO: 3.87 MILLION/UL (ref 3.88–5.62)
RSV RNA RESP QL NAA+PROBE: NEGATIVE
SARS-COV-2 RNA RESP QL NAA+PROBE: NEGATIVE
SODIUM SERPL-SCNC: 135 MMOL/L (ref 135–147)
SODIUM SERPL-SCNC: 136 MMOL/L (ref 135–147)
WBC # BLD AUTO: 10.53 THOUSAND/UL (ref 4.31–10.16)

## 2022-12-06 RX ORDER — TAMSULOSIN HYDROCHLORIDE 0.4 MG/1
0.4 CAPSULE ORAL
Status: DISCONTINUED | OUTPATIENT
Start: 2022-12-06 | End: 2022-12-14 | Stop reason: HOSPADM

## 2022-12-06 RX ORDER — INSULIN LISPRO 100 [IU]/ML
4 INJECTION, SOLUTION INTRAVENOUS; SUBCUTANEOUS
Status: DISCONTINUED | OUTPATIENT
Start: 2022-12-06 | End: 2022-12-11

## 2022-12-06 RX ADMIN — OXYCODONE HYDROCHLORIDE 10 MG: 10 TABLET ORAL at 00:18

## 2022-12-06 RX ADMIN — ACETAMINOPHEN 975 MG: 325 TABLET ORAL at 13:54

## 2022-12-06 RX ADMIN — INSULIN LISPRO 4 UNITS: 100 INJECTION, SOLUTION INTRAVENOUS; SUBCUTANEOUS at 08:51

## 2022-12-06 RX ADMIN — PANTOPRAZOLE SODIUM 40 MG: 40 TABLET, DELAYED RELEASE ORAL at 16:36

## 2022-12-06 RX ADMIN — AMLODIPINE BESYLATE 5 MG: 5 TABLET ORAL at 16:37

## 2022-12-06 RX ADMIN — OXYCODONE HYDROCHLORIDE 10 MG: 10 TABLET ORAL at 08:53

## 2022-12-06 RX ADMIN — INSULIN GLARGINE 15 UNITS: 100 INJECTION, SOLUTION SUBCUTANEOUS at 21:54

## 2022-12-06 RX ADMIN — LISINOPRIL 40 MG: 20 TABLET ORAL at 08:52

## 2022-12-06 RX ADMIN — INSULIN LISPRO 4 UNITS: 100 INJECTION, SOLUTION INTRAVENOUS; SUBCUTANEOUS at 11:20

## 2022-12-06 RX ADMIN — ACETAMINOPHEN 975 MG: 325 TABLET ORAL at 21:54

## 2022-12-06 RX ADMIN — METHOCARBAMOL TABLETS 500 MG: 500 TABLET, COATED ORAL at 08:52

## 2022-12-06 RX ADMIN — CHOLECALCIFEROL TAB 10 MCG (400 UNIT) 400 UNITS: 10 TAB at 08:52

## 2022-12-06 RX ADMIN — METHOCARBAMOL TABLETS 500 MG: 500 TABLET, COATED ORAL at 16:37

## 2022-12-06 RX ADMIN — Medication 1 TABLET: at 08:53

## 2022-12-06 RX ADMIN — ASPIRIN 81 MG CHEWABLE TABLET 81 MG: 81 TABLET CHEWABLE at 08:53

## 2022-12-06 RX ADMIN — AMLODIPINE BESYLATE 5 MG: 5 TABLET ORAL at 08:53

## 2022-12-06 RX ADMIN — ACETAMINOPHEN 975 MG: 325 TABLET ORAL at 05:34

## 2022-12-06 RX ADMIN — INSULIN LISPRO 2 UNITS: 100 INJECTION, SOLUTION INTRAVENOUS; SUBCUTANEOUS at 11:20

## 2022-12-06 RX ADMIN — OXYCODONE HYDROCHLORIDE 10 MG: 10 TABLET ORAL at 17:08

## 2022-12-06 RX ADMIN — PANTOPRAZOLE SODIUM 40 MG: 40 TABLET, DELAYED RELEASE ORAL at 05:34

## 2022-12-06 RX ADMIN — CALCIUM CARBONATE (ANTACID) CHEW TAB 500 MG 500 MG: 500 CHEW TAB at 08:56

## 2022-12-06 RX ADMIN — HYDROMORPHONE HYDROCHLORIDE 0.5 MG: 1 INJECTION, SOLUTION INTRAMUSCULAR; INTRAVENOUS; SUBCUTANEOUS at 04:47

## 2022-12-06 RX ADMIN — ENOXAPARIN SODIUM 40 MG: 40 INJECTION SUBCUTANEOUS at 16:37

## 2022-12-06 RX ADMIN — ENOXAPARIN SODIUM 40 MG: 40 INJECTION SUBCUTANEOUS at 08:50

## 2022-12-06 RX ADMIN — ONDANSETRON 4 MG: 4 TABLET, ORALLY DISINTEGRATING ORAL at 00:46

## 2022-12-06 RX ADMIN — INSULIN LISPRO 4 UNITS: 100 INJECTION, SOLUTION INTRAVENOUS; SUBCUTANEOUS at 16:35

## 2022-12-06 RX ADMIN — SODIUM CHLORIDE 1000 ML: 0.9 INJECTION, SOLUTION INTRAVENOUS at 05:33

## 2022-12-06 RX ADMIN — ATORVASTATIN CALCIUM 40 MG: 40 TABLET, FILM COATED ORAL at 08:53

## 2022-12-06 RX ADMIN — HYDROMORPHONE HYDROCHLORIDE 0.5 MG: 1 INJECTION, SOLUTION INTRAMUSCULAR; INTRAVENOUS; SUBCUTANEOUS at 00:46

## 2022-12-06 RX ADMIN — TAMSULOSIN HYDROCHLORIDE 0.4 MG: 0.4 CAPSULE ORAL at 16:36

## 2022-12-06 NOTE — PROGRESS NOTES
CLARIFY DIAGNOSIS RESPONSE NOTE    Based on the query that was sent to you, please clarify the diagnosis below      Documented

## 2022-12-06 NOTE — PLAN OF CARE
Problem: PAIN - ADULT  Goal: Verbalizes/displays adequate comfort level or baseline comfort level  Description: Interventions:  - Encourage patient to monitor pain and request assistance  - Assess pain using appropriate pain scale  - Administer analgesics based on type and severity of pain and evaluate response  - Implement non-pharmacological measures as appropriate and evaluate response  - Consider cultural and social influences on pain and pain management  - Notify physician/advanced practitioner if interventions unsuccessful or patient reports new pain  Outcome: Progressing     Problem: INFECTION - ADULT  Goal: Absence or prevention of progression during hospitalization  Description: INTERVENTIONS:  - Assess and monitor for signs and symptoms of infection  - Monitor lab/diagnostic results  - Monitor all insertion sites, i e  indwelling lines, tubes, and drains  - Monitor endotracheal if appropriate and nasal secretions for changes in amount and color  - [place] appropriate cooling/warming therapies per order  - Administer medications as ordered  - Instruct and encourage patient and family to use good hand hygiene technique  - Identify and instruct in appropriate isolation precautions for identified infection/condition  Outcome: Progressing  Goal: Absence of fever/infection during neutropenic period  Description: INTERVENTIONS:  - Monitor WBC    Outcome: Progressing     Problem: SAFETY ADULT  Goal: Patient will remain free of falls  Description: INTERVENTIONS:  - Educate patient/family on patient safety including physical limitations  - Instruct patient to call for assistance with activity   - Consult OT/PT to assist with strengthening/mobility   - Keep Call bell within reach  - Keep bed low and locked with side rails adjusted as appropriate  - Keep care items and personal belongings within reach  - Initiate and maintain comfort rounds  - Make Fall Risk Sign visible to staff  - Offer Toileting every 3 Hours, in advance of need  - Initiate/Maintain 3alarm  - Obtain necessary fall risk management equipment: 3  - Apply yellow socks and bracelet for high fall risk patients  - Consider moving patient to room near nurses station  Outcome: Progressing  Goal: Maintain or return to baseline ADL function  Description: INTERVENTIONS:  -  Assess patient's ability to carry out ADLs; assess patient's baseline for ADL function and identify physical deficits which impact ability to perform ADLs (bathing, care of mouth/teeth, toileting, grooming, dressing, etc )  - Assess/evaluate cause of self-care deficits   - Assess range of motion  - Assess patient's mobility; develop plan if impaired  - Assess patient's need for assistive devices and provide as appropriate  - Encourage maximum independence but intervene and supervise when necessary  - Involve family in performance of ADLs  - Assess for home care needs following discharge   - Consider OT consult to assist with ADL evaluation and planning for discharge  - Provide patient education as appropriate  Outcome: Progressing  Goal: Maintains/Returns to pre admission functional level  Description: INTERVENTIONS:  - Perform BMAT or MOVE assessment daily    - Set and communicate daily mobility goal to care team and patient/family/caregiver  - Collaborate with rehabilitation services on mobility goals if consulted  - Perform Range of Motion 3 times a day  - Reposition patient every 3 hours    - Dangle patient 3 times a day  - Stand patient 3 times a day  - Ambulate patient 3 times a day  - Out of bed to chair 3 times a day   - Out of bed for meals 3 times a day  - Out of bed for toileting  - Record patient progress and toleration of activity level   Outcome: Progressing     Problem: DISCHARGE PLANNING  Goal: Discharge to home or other facility with appropriate resources  Description: INTERVENTIONS:  - Identify barriers to discharge w/patient and caregiver  - Arrange for needed discharge resources and transportation as appropriate  - Identify discharge learning needs (meds, wound care, etc )  - Arrange for interpretive services to assist at discharge as needed  - Refer to Case Management Department for coordinating discharge planning if the patient needs post-hospital services based on physician/advanced practitioner order or complex needs related to functional status, cognitive ability, or social support system  Outcome: Progressing     Problem: Knowledge Deficit  Goal: Patient/family/caregiver demonstrates understanding of disease process, treatment plan, medications, and discharge instructions  Description: Complete learning assessment and assess knowledge base    Interventions:  - Provide teaching at level of understanding  - Provide teaching via preferred learning methods  Outcome: Progressing     Problem: SKIN/TISSUE INTEGRITY - ADULT  Goal: Skin Integrity remains intact(Skin Breakdown Prevention)  Description: Assess:  -Perform Michael assessment every 3  -Clean and moisturize skin every 3  -Inspect skin when repositioning, toileting, and assisting with ADLS  -Assess under medical devices such as 3 every 3  -Assess extremities for adequate circulation and sensation     Bed Management:  -Have minimal linens on bed & keep smooth, unwrinkled  -Change linens as needed when moist or perspiring  -Avoid sitting or lying in one position for more than 3 hours while in bed  -Keep HOB at 3degrees     Toileting:  -Offer bedside commode  -Assess for incontinence every 3  -Use incontinent care products after each incontinent episode such as 3    Activity:  -Mobilize patient 3 times a day  -Encourage activity and walks on unit  -Encourage or provide ROM exercises   -Turn and reposition patient every 3 Hours  -Use appropriate equipment to lift or move patient in bed  -Instruct/ Assist with weight shifting every 3 when out of bed in chair  -Consider limitation of chair time 3 hour intervals    Skin Care:  -Avoid use of baby powder, tape, friction and shearing, hot water or constrictive clothing  -Relieve pressure over bony prominences using 3  -Do not massage red bony areas    Next Steps:  -Teach patient strategies to minimize risks such as 3   -Consider consults to  interdisciplinary teams such as 3  Outcome: Progressing  Goal: Incision(s), wounds(s) or drain site(s) healing without S/S of infection  Description: INTERVENTIONS  - Assess and document dressing, incision, wound bed, drain sites and surrounding tissue  - Provide patient and family education  - Perform skin care/dressing changes every 3  Outcome: Progressing  Goal: Pressure injury heals and does not worsen  Description: Interventions:  - Implement low air loss mattress or specialty surface (Criteria met)  - Apply silicone foam dressing  - Instruct/assist with weight shifting every 3 minutes when in chair   - Limit chair time to 3 hour intervals  - Use special pressure reducing interventions such as 3 when in chair   - Apply fecal or urinary incontinence containment device   - Perform passive or active ROM every 3  - Turn and reposition patient & offload bony prominences every 3 hours   - Utilize friction reducing device or surface for transfers   - Consider consults to  interdisciplinary teams such as 3  - Use incontinent care products after each incontinent episode such as 3  - Consider nutrition services referral as needed  Outcome: Progressing     Problem: Prexisting or High Potential for Compromised Skin Integrity  Goal: Skin integrity is maintained or improved  Description: INTERVENTIONS:  - Identify patients at risk for skin breakdown  - Assess and monitor skin integrity  - Assess and monitor nutrition and hydration status  - Monitor labs   - Assess for incontinence   - Turn and reposition patient  - Assist with mobility/ambulation  - Relieve pressure over bony prominences  - Avoid friction and shearing  - Provide appropriate hygiene as needed including keeping skin clean and dry  - Evaluate need for skin moisturizer/barrier cream  - Collaborate with interdisciplinary team   - Patient/family teaching  - Consider wound care consult   Outcome: Progressing     Problem: MOBILITY - ADULT  Goal: Maintain or return to baseline ADL function  Description: INTERVENTIONS:  -  Assess patient's ability to carry out ADLs; assess patient's baseline for ADL function and identify physical deficits which impact ability to perform ADLs (bathing, care of mouth/teeth, toileting, grooming, dressing, etc )  - Assess/evaluate cause of self-care deficits   - Assess range of motion  - Assess patient's mobility; develop plan if impaired  - Assess patient's need for assistive devices and provide as appropriate  - Encourage maximum independence but intervene and supervise when necessary  - Involve family in performance of ADLs  - Assess for home care needs following discharge   - Consider OT consult to assist with ADL evaluation and planning for discharge  - Provide patient education as appropriate  Outcome: Progressing  Goal: Maintains/Returns to pre admission functional level  Description: INTERVENTIONS:  - Perform BMAT or MOVE assessment daily    - Set and communicate daily mobility goal to care team and patient/family/caregiver  - Collaborate with rehabilitation services on mobility goals if consulted  - Perform Range of Motion 3 times a day  - Reposition patient every 3 hours    - Dangle patient 3 times a day  - Stand patient 3 times a day  - Ambulate patient 3 times a day  - Out of bed to chair 3 times a day   - Out of bed for meals 3 times a day  - Out of bed for toileting  - Record patient progress and toleration of activity level   Outcome: Progressing     Problem: Potential for Falls  Goal: Patient will remain free of falls  Description: INTERVENTIONS:  - Educate patient/family on patient safety including physical limitations  - Instruct patient to call for assistance with activity   - Consult OT/PT to assist with strengthening/mobility   - Keep Call bell within reach  - Keep bed low and locked with side rails adjusted as appropriate  - Keep care items and personal belongings within reach  - Initiate and maintain comfort rounds  - Make Fall Risk Sign visible to staff  - Offer Toileting every 3 Hours, in advance of need  - Initiate/Maintain 3alarm  - Obtain necessary fall risk management equipment: 3  - Apply yellow socks and bracelet for high fall risk patients  - Consider moving patient to room near nurses station  Outcome: Progressing

## 2022-12-06 NOTE — PROGRESS NOTES
Progress Note - Sushant Tenorio 61 y o  male MRN: [id number]    Unit/Bed#: [facility] 906-01 Encounter: [id number]      Assessment:  62 y/o M w h/o colon cancer, s/p partial colectomy and hepatectomy 11/7 c/b leak, R hemicolectomy 11/16, diverting ileostomy 11/17  Recent d/c to rehab but readmitted 12/4 for wound care of midline wound    Vss  Afebrile  Midline wound with sloughing at wound base  Packed with strips of mesalt this morning  uop: 1L  Ileostomy: functional  150 cc formed stool  Labs:   Wbc: 12 9-> 10 5  Cr: 1 23-> 1 33      Plan:  Diabetic diet  lovenox dvt ppx  Bolus 1 L crystalloid for increase in cr  Prn pain control  Daily wound care to midline   Ostomy care per protocol    Subjective:   Abdominal pain overnight, improved with prn dilaudid  Denied nausea or vomiting  Denied fever or chills  No chest pain or shortness of breath  Objective:     Vitals: Blood pressure 137/78, pulse 93, temperature (!) 97 3 °F (36 3 °C), resp  rate 18, height 5' 7 99" (1 727 m), weight 103 kg (226 lb 3 1 oz), SpO2 91 %  ,Body mass index is 34 4 kg/m²  Intake/Output Summary (Last 24 hours) at 12/5/2022 2215  Last data filed at 12/5/2022 1601  Gross per 24 hour   Intake 720 ml   Output 1920 ml   Net -1200 ml       Physical Exam  General: NAD  HEENT: NC/AT  MMM  Cv: RRR  Lungs: normal effort  Ab: Soft, NT/ND  Ostomy viable  Functional    Ex: no CCE  Neuro: AAOx3      Invasive Devices     Central Venous Catheter Line  Duration           Port A Cath 03/10/22 Right Chest 270 days          Peripheral Intravenous Line  Duration           Peripheral IV 12/04/22 Right;Ventral (anterior) Forearm 1 day          Drain  Duration           Ileostomy LUQ 18 days                Lab, Imaging and other studies: I have personally reviewed pertinent reports      VTE Pharmacologic Prophylaxis: Sequential compression device (Venodyne)   VTE Mechanical Prophylaxis: sequential compression device

## 2022-12-06 NOTE — CASE MANAGEMENT
Case Management Discharge Planning Note    Patient name Marguerite Raw  Location [facility] 906/[facility] 872-20 MRN [id number]  : 1965 Date 2022       Current Admission Date: 2022  Current Admission Diagnosis:Malignant neoplasm of transverse colon [facility])   Patient Active Problem List    Diagnosis Date Noted   • Flash pulmonary edema ([address] ) 2022   • MR (mitral regurgitation) 2022   • Bacteremia 2022   • ESBL (extended spectrum beta-lactamase) producing bacteria infection 2022   • Acute respiratory failure with hypoxia ([facility] [address] ) 2022   • Encephalopathy 2022   • Cervical radiculopathy 10/26/2022   • Other fatigue 06/15/2022   • Colostomy prolapse ([facility] [address] ) 2022   • Colon cancer metastasized to liver ([facility] [address] ) 2022   • 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 liver ([facility] [address] ) 2022   • Iron deficiency anemia, unspecified 2022   • Malignant neoplasm of transverse colon ([facility] [address] ) 2022   • Thrombocytosis 2022   • Hypokalemia 2022   • Transaminitis 2022   • Type 2 diabetes mellitus with hyperlipidemia ([facility] [address] ) 2022   • Colonic mass 2022   • Microcytic anemia 2022   • Left ureteral calculus 2020   • Incarcerated umbilical hernia    • Testicular hypogonadism 2017   • Low testosterone 2017   • Type 2 diabetes mellitus without complication, with long-term current use of insulin [facility] ) 2016   • Benign essential hypertension 2016   • Mixed hyperlipidemia 2016   • Erectile dysfunction 2016   • Obesity (BMI 30-39 9) 2016      LOS (days): 0  Geometric Mean LOS (GMLOS) (days):   Days to GMLOS:     OBJECTIVE:            Current admission status: Observation   Preferred Pharmacy:   [facility]/pharmacy #[id number] [place], [place] - [address] [place] [address]  Phone: [phone] Fax: [phone] - BILL, [address] [facility] [address] [facility] [address]  Phone: [phone] Fax: [phone]    [facility]/pharmacy #[id number]- [facility] ANJELICA COBOS - [address] [facility]  [address] [facility] [address]  Phone: [phone] Fax: [phone]    Primary Care Provider: Homer Sierra MD    Primary Insurance: [employer]  Secondary Insurance:     DISCHARGE DETAILS:         Other Referral/Resources/Interventions Provided:  Referral Comments: CM spoke provider who is stating that the patient should be medically cleared for d/c by tomorrow  CM spoke with [facility] to follow up on insurance  They plan on submitting if the BMP shows improvement  Labs are currently pending at this time  Provider aware           Treatment Team Recommendation: Short Term Rehab, Acute Rehab  Discharge Destination Plan[de-identified] Acute Rehab, Short Term Rehab

## 2022-12-06 NOTE — PLAN OF CARE
Problem: PHYSICAL THERAPY ADULT  Goal: Performs mobility at highest level of function for planned discharge setting  See evaluation for individualized goals  Description: Treatment/Interventions: ADL retraining, LE strengthening/ROM, Functional transfer training, Endurance training, Patient/family training, Therapeutic exercise, Equipment eval/education, Bed mobility, Gait training, Spoke to nursing, Spoke to case management  Equipment Recommended: Raymond Brush       See flowsheet documentation for full assessment, interventions and recommendations  Outcome: Progressing  Note: Prognosis: Good  Problem List: Decreased strength, Decreased range of motion, Decreased endurance, Impaired balance, Decreased mobility, Decreased coordination, Decreased skin integrity  Assessment: Pt seen for PT treatment session this date  Therapy session focused on bed mobility, functional transfers, gait training, therapeutic exercise and endurance training in order to improve overall mobility and independence  Pt requires assist of 1-2 for all mobility performed this date  Pt requires min A for trunk management when completing bed mobility tasks  Pt requires mod A +Min A of another to complete STS; pt with posterior lean noted on initial stance with cues required to correct  Pt ambulating a total of 16 ft with extended seated rest break in between gait trial  Pt performed/ tolerated seated therex to b/l LEs with no complaints   Pt making steady progress toward goals  Pt was left sitting OOB in recliner at the end of PT session with all needs in reach  Pt would benefit from continued PT services while in hospital to address remaining limitations  PT to continue to follow pt and recommends STr  The patient's AM-PAC Basic Mobility Inpatient Short Form Raw Score is 13  A Raw score of less than or equal to 16 suggests the patient may benefit from discharge to post-acute rehabilitation services   Please also refer to the recommendation of the Physical Therapist for safe discharge planning  Barriers to Discharge: Inaccessible home environment, Decreased caregiver support     PT Discharge Recommendation: Post acute rehabilitation services    See flowsheet documentation for full assessment

## 2022-12-06 NOTE — UTILIZATION REVIEW
Continued Stay Review    Date: 12/6/2022                          Current Patient Class: observation  Current Level of Care: med/surg    HPI:57 y o  male initially admitted on 12/4     Assessment/Plan: Continue wound packing  Severe protein calorie malnutrition: Continue diet as tolerated with nutritional supplements  IVF bolus given for ALEXY  Plan to d/c to [facility] in AM to monitor Cr      Vital Signs:   Date/Time Temp Pulse Resp BP MAP (mmHg) SpO2 Calculated FIO2 (%) - Nasal Cannula Nasal Cannula O2 Flow Rate (L/min) O2 Device Patient Position - Orthostatic VS   12/06/22 15:19:43 97 7 °F (36 5 °C) 117 Abnormal  16 140/85 103 92 % -- -- -- --   12/06/22 07:46:45 97 3 °F (36 3 °C) Abnormal  104 17 160/86 111 91 % -- -- -- --   12/05/22 22:53:57 97 °F (36 1 °C) Abnormal  101 18 142/80 101 92 % -- -- -- --   12/05/22 15:37:13 97 3 °F (36 3 °C) Abnormal  93 18 137/78 98 91 % -- -- -- --   12/05/22 07:29:09 97 3 °F (36 3 °C) Abnormal  83 16 137/79 98 96 % -- -- -- --   12/05/22 07:27:54 97 3 °F (36 3 °C) Abnormal  84 -- 137/79 98 96 % -- -- -- --   12/05/22 0608 -- 85 17 -- -- 96 % 28 2 L/min Nasal cannula --   12/04/22 2300 -- 92 14 -- -- 96 % 28 2 L/min Nasal cannula --   12/04/22 2201 -- 101 19 -- -- 96 % 28 2 L/min Nasal cannula --   12/04/22 21:56:57 97 5 °F (36 4 °C) 105 17 132/95 107 96 % 28 2 L/min Nasal cannula Lying   12/04/22 17:53:35 -- -- -- 152/86 108 -- -- -- -- --   12/04/22 08:24:56 97 °F (36 1 °C) Abnormal  -- 17 146/87 107 -- -- -- None (Room air) --   12/04/22 00:15:08 99 7 °F (37 6 °C) 114 Abnormal  16 159/92 114 93 % -- -- -- --       Pertinent Labs/Diagnostic Results:   Results from last 7 days   Lab Units 12/06/22  1131   SARS-COV-2  Negative     Results from last 7 days   Lab Units 12/06/22  0037 12/05/22  0925 12/04/22  0523 12/01/22  0832   WBC Thousand/uL 10 53* 12 98* 17 51* 9 55   HEMOGLOBIN g/dL 10 5* 8 3* 8 3* 8 1*   HEMATOCRIT % 33 6* 27 7* 26 8* 25 6*   PLATELETS Thousands/uL 361 415* 428* 448*   NEUTROS ABS Thousands/µL  --  10 22* 14 34* 7 58     Results from last 7 days   Lab Units 12/06/22  1018 12/06/22  0037 12/05/22  0925 12/04/22  0542 12/01/22  0832   SODIUM mmol/L 135 136 139 138 141   POTASSIUM mmol/L 4 1 3 8 3 8 4 5 3 7   CHLORIDE mmol/L 110* 110* 112* 112* 115*   CO2 mmol/L 18* 20* 20* 19* 18*   ANION GAP mmol/L 7 6 7 7 8   BUN mg/dL 18 18 19 17 19   CREATININE mg/dL 1 25 1 33* 1 23 1 16 0 95   EGFR ml/min/1 73sq m 63 58 64 69 87   CALCIUM mg/dL 8 5 8 2* 8 5 8 1* 8 4   PHOSPHORUS mg/dL 3 5  --   --   --   --      Results from last 7 days   Lab Units 12/06/22  1606 12/06/22  1120 12/06/22  0821 12/05/22  2102 12/05/22  1659 12/05/22  1100 12/05/22  0907 12/05/22  0727 12/04/22  2111 12/04/22  1615 12/04/22  1153 12/04/22  0929   POC GLUCOSE mg/dl 130 172* 201* 191* 203* 164* 174* 164* 213* 209* 217* 182*     Results from last 7 days   Lab Units 12/06/22  1018 12/06/22  0037 12/05/22  0925 12/04/22  0542 12/01/22  0832   GLUCOSE RANDOM mg/dL 207* 195* 180* 171* 153*     Results from last 7 days   Lab Units 12/06/22  1131   INFLUENZA A PCR  Negative   INFLUENZA B PCR  Negative   RSV PCR  Negative     Medications:   Scheduled Medications:  acetaminophen, 975 mg, Oral, Q8H SHANNON  amLODIPine, 5 mg, Oral, BID  aspirin, 81 mg, Oral, Daily  atorvastatin, 40 mg, Oral, Daily  cholecalciferol, 400 Units, Oral, Daily  enoxaparin, 40 mg, Subcutaneous, BID  insulin glargine, 15 Units, Subcutaneous, HS  insulin lispro, 2-12 Units, Subcutaneous, TID AC  insulin lispro, 4 Units, Subcutaneous, TID With Meals  lisinopril, 40 mg, Oral, Daily  methocarbamol, 500 mg, Oral, BID  multivitamin-minerals, 1 tablet, Oral, Daily  pantoprazole, 40 mg, Oral, BID AC  tamsulosin, 0 4 mg, Oral, Daily With Dinner    PRN Meds:  calcium carbonate, 500 mg, Oral, BID PRN  HYDROmorphone, 0 5 mg, Intravenous, Q3H PRN 12/5 x1, 12/6 x2  ondansetron, 4 mg, Oral, Q6H PRN 12/5 x1, 12/6 x1  oxyCODONE, 10 mg, Oral, Q6H PRN 12/4 x2, 12/6 x3  oxyCODONE, 5 mg, Oral, Q6H PRN 12/5 x2        Discharge Plan: TBD    Network Utilization Review Department  ATTENTION: Please call with any questions or concerns to [phone] and carefully listen to the prompts so that you are directed to the right person  All voicemails are confidential   Beth Grand all requests for admission clinical reviews, approved or denied determinations and any other requests to dedicated fax number below belonging to the campus where the patient is receiving treatment   List of dedicated fax numbers for the Facilities:  [address] DENIALS (Administrative/Medical Necessity) [phone]   [address] (Maternity/NICU/Pediatrics) [phone]   91 Ericka Pinto [phone]   [facility] [phone]   [address] [address] [facility] [phone]   [address] Janee Pete [facility] 134 815 [facility] [phone]

## 2022-12-06 NOTE — QUICK NOTE
Patient with a creatine 1 33 from 1 2, 1 1  Will give 1 liter bolus of NSS over 2 hours and repeat BMP at 0900

## 2022-12-06 NOTE — [facility] ADMISSION
Per PM&R consult and [facility] physician, patient is approved for [facility] pending medical stability and functional progress  CM has been updated  Will continue to follow at this time, pending repeat BMP results  Notified by CM that patient with noted improvement in creatinine level, and is now medically cleared for discharge  Insurance authorization has been initiated, with pending ref #: [id number], and we await their determination at this time  Patient will require COVID testing  CM has been updated  Will continue to follow and update as able

## 2022-12-06 NOTE — PROGRESS NOTES
PHYSICAL MEDICINE AND REHABILITATION   PREADMISSION ASSESSMENT     Projected [facility] Diagnoses:  Impairment of mobility, safety and Activities of Daily Living (ADLs) due to Neurologic Conditions:  03 8  Neuromuscular Disorders  Etiologic Dx: Critical Illness Myopathy  Date of Onset: 11/7/2022   Date of surgery:     11/7/2022 Exploratory Laparotomy, resection of hepatic segment 3, resection of transverse colon with reversal of loop colostomy  11/16/2022 Right hemicolectomy, left in discontinuity and temporary abdominal closure device placed  11/17/2022 Re-exploration, partial descending colectomy, primary colocolonic anastomosis created with diverting loop ileostomy, midline wound VAC placement  12/8/2022 IR Percutaneous Cholecystostomy Tube Placement, Hepatectomy Abscess Drain Placement  12/12/2022 IR Tube Check    PATIENT INFORMATION  Name: Lillie Kaur Phone #: [phone] (home)   Address: [address] [address]  YOB: 1965 Age: 61 y o  SS#   Marital Status: /Civil Union  Ethnicity:   Employment Status: on disability - worked on [employer]  Extended Emergency Contact Information  Primary Emergency Contact:  Vika Fuentes  Address: [address] [place] [address]  Mobile Phone: [phone]  Relation: Spouse  Secondary Emergency Contact: Rod Roy  Mobile Phone: [phone]  Relation: Brother  Advance Directive: Level 1 Full Code - unknown advanced directive    INSURANCE/COVERAGE:     Primary Payor: CHERELLE / Plan: Pancho Garcia / Product Type: HMO Commercial /   Secondary Payer: Private Pay   Payer Contact: Aga Haddad Payer Contact:   Contact Phone: [phone] [facility] [id number]  Contact Fax: [phone] Contact Phone:     Authorization #: [id number]  Coverage Dates: 12/14 - 12/20 (7 days)  LCD: 12/20 with review  MEDICARE #: N/A  Medicare Days: N/A  Medical Record #: [id number]    TKPNEULK SOURCE:   Referring provider: Dempsey Fabry, MD  Referring facility: [address] [place] [address]  Room: [facility] 906/[facility] 906-01  PCP: Barbi Veras MD PCP phone number: [phone]    MEDICAL INFORMATION  HPI: Patient is a 61year old male that originally presented to [place] [id number] on 11/7/2022 for an elective surgical procedure due to metastatic colon adenocarcinoma  Patient had presented to outpatient Surgical Oncology s/p MRI in September 2022, which revealed multiple hepatic metastasis, with smaller lesions in left lobe with largest lesions on right lobe  Follow-up colonoscopy showed mass in mid-transverse colon involving entire circumference  On 11/7, patient underwent exploratory laparotomy, resection of hepatic segment 3, resection of transverse colon with reversal of loop colostomy  Patient had prolonged ICU course, with initial extubation on 11/11  He returned to the OR on 11/16 for right hemicolectomy, left in discontinuity and temporary abdominal closure device was placed  On 11/17, he underwent re-exploration, partial descending colectomy, primary colocolonic anastomosis created with diverting loop ileostomy and midline wound VAC placement  Patient remained in ICU postoperatively, and was extubated on 11/22  He completed a prolonged course of IV antibiotics for ESBL bacteremia due to intra-abdominal abscess  On 12/3, patient was discharged to SNF for rehab  Patient was transferred back to hospital on 12/4 due to abdominal pain  He is unable to return to SNF due to concerns for pain management and management of acute medical needs  Per Surgery, patient is to continue with wet-to-dry dressings to midline abdominal incision, and packing to old stoma site  Patient has been observed off of antibiotics, with close monitoring of leukocytosis  On 12/6, patient was with noted increased creatinine level, and was administered 1L IV fluid bolus, with noted improvement   He then developed tachycardia, as well as increased abdominal pain, requiring additional IV fluid bolus  Abdominal incision was with noted yellow drainage  CT chest/abdomen/pelvis showed abdominal abscess and distended gallbladder  ID was consulted, and patient was continued on IV antibiotics (plan for 7 day course through 12/15)  Patient was taken to IR and underwent percutaneous cholecystostomy tube insertion and hepatectomy abscess drainage  Nephrology was consulted re: ALEXY, and initiated patient on Sodium Bicarb gtt as well as IV Albumin to assist with intravascular volume shifts, which as since been discontinued  On 12/14, patient with complaints of midline/left chestwall pain, right below IR abscess drain  Lasted for a few seconds and then resolved - suspected related to drain placement, however cardiac workup completed  Trop level was 57 and CXR showed progressive bilateral opacities suspicious for CHF and small left pleural effusion  Cardiology was consulted, with chest pain appearing musculoskeletal, and no further cardiac workup is warranted  Additionally, no diuresis or further intervention suggested for CXR findings  Patient is overall hemodynamically stable and medically cleared for discharge to [facility]  PT/OT therapies and PM&R were consulted, and they are recommending patient for inpatient Acute Rehab  He has demonstrated that he can tolerate and participate in 3 hours of therapy per day  Fully COVID vaccinated - Yo Kuhn; COVID test resulted negative on 12/6/2022 and 12/13/2022  Past Medical History:   Past Surgical History:    Allergies:     Past Medical History:   Diagnosis Date   • Abdominal pain 03/12/2022   • Acute renal failure ([facility] [address] )     [id number] resolved   • Cancer ([facility] [address] )    • Diabetes mellitus ([facility] [address] )    • Enteritis 08/23/2016   • Gastroparesis due to DM ([facility] [address] ) 08/23/2016   • GERD (gastroesophageal reflux disease)    • Hernia, ventral 08/04/2016   • Hyperlipidemia    • Hypertension    • Morbid obesity ([place] [address] ) 04/17/2018   • Postoperative visit 03/02/2022   • SIRS (systemic inflammatory response syndrome) ([place] [address] ) 03/12/2022   • Snoring    • Stage 3a chronic kidney disease ([place] [address] ) 02/19/2022    Past Surgical History:   Procedure Laterality Date   • COLONOSCOPY     • COLOSTOMY N/A 02/20/2022    Procedure: COLOSTOMY LOOP, diverting;  Surgeon: Jada Dickerson MD;  Location: [facility];  Service: General   • ESOPHAGOGASTRODUODENOSCOPY N/A 08/24/2016    Procedure: ESOPHAGOGASTRODUODENOSCOPY (EGD); Surgeon: Aroldo Magallon MD;  Location: [facility];   Service:    • EXPLORATORY LAPAROTOMY W/ BOWEL RESECTION N/A 11/16/2022    Procedure: LAPAROTOMY EXPLORATORY W/ BOWEL RESECTION- VAC PLACEMENT;  Surgeon: Barbra Reveles MD;  Location: [facility];  Service: Surgical Oncology   • EXPLORATORY LAPAROTOMY W/ BOWEL RESECTION N/A 11/17/2022    Procedure: LAPAROTOMY EXPLORATORY W/ BOWEL RESECTION;  Surgeon: Barbra Reveles MD;  [facility]: [facility];  Service: Surgical Oncology   • ILEOSTOMY N/A 11/17/2022    Procedure: ILEOSTOMY;  Surgeon: Barbra Reveles MD;  [facility];  Service: Surgical Oncology   • ILEOSTOMY CLOSURE N/A 11/7/2022    Procedure: REVERSAL COLOSTOMY;  Surgeon: Jonny Franco MD;  [facility];  Service: Surgical Oncology   • IR CHOLECYSTOSTOMY TUBE CHECK/CHANGE/REPOSITION/REINSERTION/UPSIZE  12/12/2022   • IR CHOLECYSTOSTOMY TUBE PLACEMENT  12/8/2022   • IR DRAINAGE TUBE PLACEMENT  12/8/2022   • IR PORT PLACEMENT  03/10/2022   • KIDNEY STONE SURGERY     • LIVER BIOPSY LAPAROSCOPIC N/A 02/20/2022    Procedure: DIAGNOSTIC LAPAROSCOPIC LIVER BIOPSY, DIVERTING LOOP COLOSTOMY ;  Surgeon: Jada Dickerson MD;  Location: [facility];  Service: General   • LIVER LOBECTOMY N/A 11/7/2022    Procedure: SEGMENT 3 LIVER RESECTION, ABLATION, INTRAOPERATIVE U/S OF LIVER, PERITONEAL BIOPSY;  Surgeon: Jonny Franco MD;  [facility];  Service: Surgical Oncology   • RIGHT COLON RESECTION N/A 11/7/2022 Procedure: EX LAP, TRANVERSE COLON RESECTION;  Surgeon: Reji Mendez MD;  [facility];  Service: Surgical Oncology   • TONSILLECTOMY     • UMBILICAL HERNIA REPAIR LAPAROSCOPIC N/A 04/26/2019    Procedure: LAPAROSCOPIC UMBILICAL HERNIA REPAIR;  Surgeon: Eladia Randle MD;  [facility];  Service: General   • VAC DRESSING APPLICATION N/A 24/57/7241    Procedure: APPLICATION VAC DRESSING ABDOMEN/TRUNK;  Surgeon: Rainer Whitaker MD;  [facility];  Service: Surgical Oncology     Allergies   Allergen Reactions   • Shellfish-Derived Products - Food Allergy Anaphylaxis   • Erythromycin GI Intolerance         Medical/functional conditions requiring inpatient rehabilitation: metastatic colon adenocarcinoma s/p transverse colon resection, right hemicolectomy, partial descending colectomy, parimary colocolonic anastomosis and diverting loop ileostomy; acute pain, leukocytosis, acute respiratory failure with hypoxia, encephalopathy, ESBL bacteremia due to intra-abdominal abscess, abdominal abscess s/p IR drain placement, hepatectomy abscess s/p IR drain placement, impaired mobility and self care    Risk for medical/clinical complications: risk for falls, risk for uncontrolled pain, risk for infection, risk for wound dehiscence/skin breakdown, risk for DVT/PE, risk for hypo/hypertensive episodes, risk for hypo/hypertensive episodes    Comorbidities/Surgeries in the last 100 days: DM, gastroparesis, GERD, HLD, HTN, CKD3, 11/7/2022 Exploratory Laparotomy, resection of hepatic segment 3, resection of transverse colon with reversal of loop colostomy, 11/16/2022 Right hemicolectomy, left in discontinuity and temporary abdominal closure device placed, 11/17/2022 Re-exploration, partial descending colectomy, primary colocolonic anastomosis created with diverting loop ileostomy, midline wound VAC placement; 12/8/2022 IR Percutaneous Cholecystostomy Tube Placement, Hepatectomy Abscess Drain Placement, 12/12/2022 IR Tube Check    CURRENT VITAL SIGNS:   Temp:  [97 2 °F (36 2 °C)-97 3 °F (36 3 °C)] 97 2 °F (36 2 °C)  HR:  [90-95] 90  Resp:  [20] 20  BP: (148-152)/(87-88) 148/88   Intake/Output Summary (Last 24 hours) at 12/14/2022 1446  Last data filed at 12/14/2022 1301  Gross per 24 hour   Intake 10 ml   Output 2505 ml   Net -2495 ml        LABORATORY RESULTS:      Lab Results   Component Value Date    HGB 8 0 (L) 12/14/2022    HGB 13 6 07/21/2017    HCT 26 0 (L) 12/14/2022    HCT 39 3 07/21/2017    WBC 11 86 (H) 12/14/2022    WBC 10 8 07/21/2017     Lab Results   Component Value Date    BUN 16 12/14/2022    BUN 21 09/26/2022     05/02/2017    K 3 7 12/14/2022    K 4 1 09/26/2022     12/14/2022     09/26/2022    GLUCOSE 151 (H) 11/16/2022    GLUCOSE 118 (H) 05/02/2017    CREATININE 1 43 (H) 12/14/2022    CREATININE 1 18 05/02/2017     Lab Results   Component Value Date    PROTIME 14 4 11/12/2022    INR 1 10 11/12/2022        DIAGNOSTIC STUDIES:  No results found  PRECAUTIONS/SPECIAL NEEDS:  Tobacco:   Social History     Tobacco Use   Smoking Status Never   Smokeless Tobacco Never   , Alcohol:    Social History     Substance and Sexual Activity   Alcohol Use Never   , Anticoagulation:  aspirin 81 mg orally every day and heparin, Blood Sugar Management: as per MD recommendations, Edema Management, Safety Concerns, Pain Management, IV: Type: Peripheral Location: Left Antecubital Reason: Medications/IVF, Dietary Restrictions: Diabetic diet, Language Preference: English and Fall Precautions      MEDICATIONS:     Current Facility-Administered Medications:   •  acetaminophen (TYLENOL) tablet 975 mg, 975 mg, Oral, Q8H [address], Roderick Joseph MD, 975 mg at 12/14/22 0504  •  amLODIPine (NORVASC) tablet 5 mg, 5 mg, Oral, BID, Roderick Joseph MD, 5 mg at 12/14/22 0915  •  aspirin chewable tablet 81 mg, 81 mg, Oral, Daily, Roderick Joseph MD, 81 mg at 12/14/22 0915  •  atorvastatin (LIPITOR) tablet 40 mg, 40 mg, Oral, Daily, Ari Montez MD, 40 mg at 12/14/22 0915  •  calcium carbonate (TUMS) chewable tablet 500 mg, 500 mg, Oral, BID PRN, Governor Geoffrey MD, 500 mg at 12/11/22 2226  •  cholecalciferol (VITAMIN D3) tablet 400 Units, 400 Units, Oral, Daily, Ari Montez MD, 400 Units at 12/14/22 0915  •  ertapenem (INVanz) 1,000 mg in sodium chloride 0 9 % 50 mL IVPB, 1,000 mg, Intravenous, Q24H, Ermias Parrish MD, Last Rate: 100 mL/hr at 12/13/22 1211, 1,000 mg at 12/13/22 1211  •  heparin (porcine) subcutaneous injection 5,000 Units, 5,000 Units, Subcutaneous, Q8H [address], Maggy Villatoro PA-C, 5,000 Units at 12/14/22 0504  •  HYDROmorphone (DILAUDID) injection 0 5 mg, 0 5 mg, Intravenous, Q3H PRN, Governor Geoffrey MD, 0 5 mg at 12/06/22 0447  •  insulin glargine (LANTUS) subcutaneous injection 12 Units 0 12 mL, 12 Units, Subcutaneous, HS, Sheila Daniel MD  •  insulin lispro (HumaLOG) 100 units/mL subcutaneous injection 1-5 Units, 1-5 Units, Subcutaneous, HS, Richard Ovalle MD, 1 Units at 12/11/22 2230  •  insulin lispro (HumaLOG) 100 units/mL subcutaneous injection 1-6 Units, 1-6 Units, Subcutaneous, TID AC, 1 Units at 12/13/22 1617 **AND** Fingerstick Glucose (POCT), , , TID AC, Richard Arguelles MD  •  insulin lispro (HumaLOG) 100 units/mL subcutaneous injection 3 Units, 3 Units, Subcutaneous, TID With Meals, Sheila Daniel MD  •  methocarbamol (ROBAXIN) tablet 500 mg, 500 mg, Oral, BID, Ari Montez MD, 500 mg at 12/14/22 0915  •  metoprolol (LOPRESSOR) injection 5 mg, 5 mg, Intravenous, Q8H PRN, Maggy Villatoro PA-C, 5 mg at 12/08/22 0508  •  multivitamin-minerals (CENTRUM) tablet 1 tablet, 1 tablet, Oral, Daily, Ari Montez MD, 1 tablet at 12/14/22 0915  •  ondansetron (ZOFRAN-ODT) dispersible tablet 4 mg, 4 mg, Oral, Q6H PRN, Ari Montez MD, 4 mg at 12/08/22 0440  •  oxyCODONE (ROXICODONE) immediate release tablet 10 mg, 10 mg, Oral, Q6H PRN, Arlyn Herron MD Qing, 10 mg at 12/13/22 1940  •  oxyCODONE (ROXICODONE) IR tablet 5 mg, 5 mg, Oral, Q6H PRN, Archana Snell MD, 5 mg at 12/07/22 7619  •  pantoprazole (PROTONIX) EC tablet 40 mg, 40 mg, Oral, BID AC, Archana Snell MD, 40 mg at 12/14/22 0915  •  tamsulosin (FLOMAX) capsule 0 4 mg, 0 4 mg, Oral, Daily With Mirna Cardona MD, 0 4 mg at 12/13/22 1615    SKIN INTEGRITY:   Pressure Ulcer: Buttocks L gluteal Stage II and Buttocks R gluteal Stage II, Incision: healing well, no significant drainage, no dehiscence, no significant erythema, midline abdominal incision with DSD, old stoma site with DSD    PRIOR LEVEL OF FUNCTION:  He lives in a(n) single family home  Severo Herr is  and lives with their family (spouse and 2 teenage children)  Self Care: Independent, Indoor Mobility: Independent, Stairs (in/outdoor): Independent and Cognition: Independent  Prior to patient's admission, patient was fully Independent with ADLs and IADLs, including driving  He was Independent without use of AD for mobility  FALLS IN THE LAST 6 MONTHS: none    HOME ENVIRONMENT:  The living area: can live on one level, bedroom on 2nd floor and bathroom on 2nd floor; 1/2 bath on main level and able to accommodate hospital bed in study  There are 2 steps to enter the home  The patient will not have 24 hour supervision/physical assistance available upon discharge  Patient's spouse and family is supportive, able to assist as needed  PREVIOUS DME:  Equipment in home (previous DME): Single Point Cane    FUNCTIONAL STATUS:  Physical Therapy Occupational Therapy Speech Therapy   12/13/2022, per PT    Cognition   Overall Cognitive Status [facility]   Arousal/Participation Alert; Cooperative   Attention Within functional limits   Orientation Level Oriented to person;Oriented to place;Oriented to situation   Memory Within functional limits   Following Commands Follows one step commands without difficulty Subjective   Subjective Pt alert in bed; agreed to ambulate with encourgement; reports feeling tired today   Bed Mobility   Supine to Sit 3  Moderate assistance   Additional items Assist x 1;HOB elevated; Increased time required;Verbal cues   Additional Comments upon sitting EOB pt reported feeling dizzy (/88)  pt sat at EOB for a couple minutes until sxs subsided  Transfers   Sit to Stand 3  Moderate assistance   Additional items Assist x 2; Increased time required;Verbal cues   Stand to Sit 3  Moderate assistance   Additional items Assist x 2; Increased time required;Verbal cues   Additional Comments with RW   Ambulation/Elevation   Gait pattern Inconsistent farhan; Foward flexed; Short stride; Excessively slow   Gait Assistance 3  Moderate assist   Additional items Assist x 1;Verbal cues   Assistive Device Rolling walker   Distance 8ft   Balance   Static Sitting Fair -   Dynamic Sitting Fair -   Static Standing Poor +   Dynamic Standing Poor   Ambulatory Poor   Endurance Deficit   Endurance Deficit Yes   Endurance Deficit Description fatigue and weakness   Activity Tolerance   Activity Tolerance Patient limited by fatigue   Nurse Made Aware Spoke to RN   Exercises   Knee AROM Long Arc Quad Sitting;15 reps;AROM; Bilateral  (2 sets)   Ankle Pumps Sitting;15 reps;AROM; Bilateral  (2 sets)   Marching Sitting;10 reps;AROM; Bilateral   Assessment   Prognosis Good   Problem List Decreased strength;Decreased endurance; Impaired balance;Decreased mobility; Decreased coordination   Assessment PT session limited due to pts reported tiredness/dizziness  Pt is making slow progress continuing to require Mod (A) x1 for bed mobility and ambulation with RW and Mod (A) x2 with transfers  Pt has limited endurance at this time demonstrated by only being able to ambulate short distances  Therapeutic exercises were introduced this session to educate on HEP and work on LE strength  Pt tolerated exercises well with NAD   Pt is recommended for continued skilled therapy while in the hospital to increase functional independence and rehab is recommended upon D/C when medically cleared; will follow  12/13/2022, per OT    ADL   Where Assessed Chair   Grooming Assistance 5  Supervision/Setup   Grooming Deficit Brushing hair   Grooming Comments + washing hair   LB Dressing Assistance 4  Minimal Assistance   LB Dressing Deficit Don/doff L sock; Don/doff R sock   LB Dressing Comments increased A for L sock   Bed Mobility   Supine to Sit Unable to assess   Sit to Supine Unable to assess   Transfers   Sit to Stand 3  Moderate assistance   Additional items Assist x 1; Increased time required;Armrests   Stand to Sit 3  Moderate assistance   Additional items Assist x 1; Increased time required   Additional Comments transfers with RW   Cognition   Overall Cognitive Status [facility]   Arousal/Participation Responsive; Cooperative   Attention Attends with cues to redirect   Orientation Level Oriented X4   Following Commands Follows one step commands with increased time or repetition   Comments Pt pleasant and cooperative t/o session   Additional Activities   Additional Activities Comments Pt engaged in FMC/pinching tasks, noted to have difficulty performing thumb opposition with LUE, unable to perform thumb PIP flexion   Activity Tolerance   Activity Tolerance Patient limited by fatigue   Medical Staff Made Aware RN   Assessment   Assessment Patient participated in Skilled OT session this date with interventions consisting of ADL re training with the use of correct body mechnaics, Energy Conservation techniques, safety awareness and fall prevention techniques,  therapeutic activities to: increase activity tolerance, increase dynamic sit/ stand balance during functional activity  and increase OOB/ sitting tolerance   Upon arrival patient was found seated OOB to Chair  Pt demonstrated the following tasks: MOD A STS, S for hair care routine, and MIN A for socks   Pt with increased ability to doff/don R sock, increased A for L  Pt also engaged in FMC/pinching task  Patient continues to be functioning below baseline level, occupational performance remains limited secondary to factors listed above and increased risk for falls and injury  From OT standpoint, recommendation at time of d/c would be STR  Patient to benefit from continued Occupational Therapy treatment while in the hospital to address deficits as defined above and maximize level of functional independence with ADLs and functional mobility  Pt was left in chair with alarm on after session with all current needs met  The patient's raw score on the -PAC Daily Activity inpatient short form is 16, standardized score is 35 96, less than 39 4  Patients at this level are likely to benefit from discharge to post-acute rehabilitation services  Please refer to the recommendation of the Occupational Therapist for safe discharge planning  N/A     CARE SCORES:  Self Care:  Eatin: Setup or clean-up assistance  Oral hygiene: 04: Supervision or touching  assistance  Toilet hygiene: 09: Not applicable  Shower/bathing self: 09: Not applicable  Upper body dressin: Not applicable  Lower body dressin: Supervision or touching  assistance  Putting on/taking off footwear: 03: Partial/moderate assistance  Transfers:  Roll left and right: 09: Not applicable  Sit to lyin: Not applicable  Lying to sitting on side of bed: 03: Partial/moderate assistance  Sit to stand: 02: Substantial/maximal assistance  Chair/bed to chair transfer: 02:  Substantial/maximal assistance  Toilet transfer: 09: Not applicable  Mobility:  Walk 10 ft: 88: Not attempted due to medical conditions or safety concerns  Walk 50 ft with two turns: 10: Not attempted due to environmental limitations  Walk 150ft: 88: Not attempted due to medical conditions or safety concerns    CURRENT GAP IN FUNCTION  Prior to Admission: Functional Status: Patient was independent with mobility/ambulation, transfers, ADL's, IADL's  Estimated length of stay: 2 - 3 weeks    Anticipated Post-Discharge Disposition/Treatment  Disposition: Return to previous home/apartment  Outpatient Services: Physical Therapy (PT) and Occupational Therapy (OT)    BARRIERS TO DISCHARGE  Weakness, Pain, Balance Difficulty, Fatigue, Home Accessibility, Caregiver Accessibility, Financial Resources, Equipment Needs and Resource Availability    INTERVENTIONS FOR DISCHARGE  Adaptive equipment, Patient/Family/Caregiver Education, Community Resources, Financial Assistance, Arrange DME needs, Medication Changes as per MD recommendations, Therapy exercises, Center of balance support  and Energy conservation education     REQUIRED THERAPY:  Patient will require PT and OT 90 minutes each per day, five days per week to achieve rehab goals  REQUIRED FUNCTIONAL AND MEDICAL MANAGEMENT FOR INPATIENT REHABILITATION:  Skin:  Pressure Ulcer: Buttocks L gluteal Stage II and Buttocks R gluteal Stage II, Treatment recommendation for the pressure sore(s) include: Turn patient every 2 hours while in bed and perform pressure relief in wheelchair every 20 minutes for 20 seconds  and Nurses and therapists will teach the patient and/or family skin inspection and pressure sore prevention  , Pain Management: Overall pain is moderately controlled, Deep Vein Thrombosis (DVT) Prophylaxis:  heparin and ASA , Diabetes Management: continue sliding scale insulin, patient to do finger sticks as ordered, SLIM to continue to manage diabetes, and further IM management of additional medical conditions while on the ARC, he needs PT/OT intervention, patient/family education and training, possible Neuropsych and Surgical Oncology consults with any other needed consults prn, nursing medication review and management of bowel/bladder function      RECOMMENDED LEVEL OF CARE:   Patient is a 61year old male that originally presented to [facility] on 11/7/2022 for an elective surgical procedure due to metastatic colon adenocarcinoma  Patient had presented to outpatient Surgical Oncology s/p MRI in September 2022, which revealed multiple hepatic metastasis, with smaller lesions in left lobe with largest lesions on right lobe  Follow-up colonoscopy showed mass in mid-transverse colon involving entire circumference  On 11/7, patient underwent exploratory laparotomy, resection of hepatic segment 3, resection of transverse colon with reversal of loop colostomy  Patient had prolonged ICU course, with initial extubation on 11/11  He returned to the OR on 11/16 for right hemicolectomy, left in discontinuity and temporary abdominal closure device was placed  On 11/17, he underwent re-exploration, partial descending colectomy, primary colocolonic anastomosis created with diverting loop ileostomy and midline wound VAC placement  Patient remained in ICU postoperatively, and was extubated on 11/22  He completed a prolonged course of IV antibiotics for ESBL bacteremia due to intra-abdominal abscess  On 12/3, patient was discharged to SNF for rehab  Patient was transferred back to hospital on 12/4 due to abdominal pain  He is unable to return to SNF due to concerns for pain management and management of acute medical needs  Per Surgery, patient is to continue with wet-to-dry dressings to midline abdominal incision, and packing to old stoma site  Patient has been observed off of antibiotics, with close monitoring of leukocytosis  On 12/6, patient was with noted increased creatinine level, and was administered 1L IV fluid bolus, with noted improvement  He then developed tachycardia, as well as increased abdominal pain, requiring additional IV fluid bolus  Abdominal incision was with noted yellow drainage  CT chest/abdomen/pelvis showed abdominal abscess and distended gallbladder   ID was consulted, and patient was continued on IV antibiotics (plan for 7 day course through 12/15)  Patient was taken to IR and underwent percutaneous cholecystostomy tube insertion and hepatectomy abscess drainage  Nephrology was consulted re: ALEXY, and initiated patient on Sodium Bicarb gtt as well as IV Albumin to assist with intravascular volume shifts, which as since been discontinued  On 12/14, patient with complaints of midline/left chestwall pain, right below IR abscess drain  Lasted for a few seconds and then resolved - suspected related to drain placement, however cardiac workup completed  Trop level was 57 and CXR showed progressive bilateral opacities suspicious for CHF and small left pleural effusion  Cardiology was consulted, with chest pain appearing musculoskeletal, and no further cardiac workup is warranted  Additionally, no diuresis or further intervention suggested for CXR findings  Prior to patient's admission, patient was fully Independent with ADLs and IADLs, including driving  He was Independent without use of AD for mobility  Currently, patient is Mod assist of 1-2 with use of RW for gait and transfers, and Supervision for Grooming, Min assist for LB ADLs  Close medical management and PM&R management is recommended at this time while patient is on the [facility]  Inpatient acute rehab is recommended for patient to maximize overall strength and mobility upon discharge to home with support of family

## 2022-12-06 NOTE — PHYSICAL THERAPY NOTE
PHYSICAL THERAPY NOTE          Patient Name: Demetris VALENZUELAU Date: 12/6/2022 12/06/22 0848   PT Last Visit   PT Visit Date 12/06/22   Note Type   Note Type Treatment   Pain Assessment   Pain Assessment Tool 0-10   Pain Score 7   Pain Location/Orientation Location: Abdomen   Hospital Pain Intervention(s) Repositioned; Ambulation/increased activity; Emotional support   Restrictions/Precautions   Weight Bearing Precautions Per Order No   Other Precautions Multiple lines; Fall Risk;Pain   General   Chart Reviewed Yes   Response to Previous Treatment Patient with no complaints from previous session  Family/Caregiver Present No   Cognition   Overall Cognitive Status WFL   Arousal/Participation Responsive; Cooperative   Attention Attends with cues to redirect   Orientation Level Oriented X4   Memory Unable to assess   Following Commands Follows one step commands with increased time or repetition   Comments pt pleasant and cooperative to participate in therapy session - very motivated   Bed Mobility   Supine to Sit 4  Minimal assistance   Additional items Assist x 1; Increased time required;Verbal cues;HOB elevated  (+ trunk management)   Sit to Supine Unable to assess   Additional Comments pt supine in bed upon arrival  Pt sitting OOB in recliner with all needs wihtin reach at the end of therapy session   Transfers   Sit to Stand 3  Moderate assistance  (+ min A of another)   Additional items Increased time required;Verbal cues; Assist x 2   Stand to Sit 3  Moderate assistance   Additional items Assist x 1; Increased time required;Verbal cues   Additional Comments pt completes 3x STS throughout therapy session with mod Ax1 + min A of another   Ambulation/Elevation   Gait pattern Excessively slow; Short stride; Foward flexed;Decreased foot clearance; Improper Weight shift   Gait Assistance 3  Moderate assist   Additional items Assist x 1;Verbal cues; Tactile cues  (+ close SBA for safety + chair follow)   Assistive Device Standard walker   Distance 8ft (extended seated rest) 8ft   Balance   Static Sitting Fair +   Dynamic Sitting Fair   Static Standing Poor +   Dynamic Standing Poor   Ambulatory Poor   Endurance Deficit   Endurance Deficit Yes   Endurance Deficit Description generalized weakness, deconditioning   Activity Tolerance   Activity Tolerance Patient tolerated treatment well   Medical Staff Made Aware Restorative Violeta   Nurse Made Aware RN cleared pt to participate in therapy session   Exercises   Knee AROM Long Arc Quad Sitting;Bilateral;10 reps;AROM   UE Exercise Sitting;Bilateral;10 reps;AROM   Marching Sitting;Bilateral;10 reps;AROM   Assessment   Prognosis Good   Problem List Decreased strength;Decreased range of motion;Decreased endurance; Impaired balance;Decreased mobility; Decreased coordination;Decreased skin integrity   Assessment Pt seen for PT treatment session this date  Therapy session focused on bed mobility, functional transfers, gait training, therapeutic exercise and endurance training in order to improve overall mobility and independence  Pt requires assist of 1-2 for all mobility performed this date  Pt requires min A for trunk management when completing bed mobility tasks  Pt requires mod A +Min A of another to complete STS; pt with posterior lean noted on initial stance with cues required to correct  Pt ambulating a total of 16 ft with extended seated rest break in between gait trial  Pt performed/ tolerated seated therex to b/l LEs with no complaints   Pt making steady progress toward goals  Pt was left sitting OOB in recliner at the end of PT session with all needs in reach  Pt would benefit from continued PT services while in hospital to address remaining limitations  PT to continue to follow pt and recommends STr  The patient's AM-PAC Basic Mobility Inpatient Short Form Raw Score is 13   A Raw score of less than or equal to 16 suggests the patient may benefit from discharge to post-acute rehabilitation services  Please also refer to the recommendation of the Physical Therapist for safe discharge planning  Barriers to Discharge Inaccessible home environment;Decreased caregiver support   Goals   Patient Goals to be able walk   STG Expiration Date 12/18/22   PT Treatment Day 1   Plan   Treatment/Interventions ADL retraining;LE strengthening/ROM; Functional transfer training; Endurance training;Patient/family training; Therapeutic exercise;Equipment eval/education; Bed mobility;Gait training;Spoke to nursing;Spoke to case management   Progress Progressing toward goals   PT Frequency 3-5x/wk   Recommendation   PT Discharge Recommendation Post acute rehabilitation services   Equipment Recommended [address] Mobility Inpatient   Turning in Bed Without Bedrails 3   Lying on Back to Sitting on Edge of Flat Bed 3   Moving Bed to Chair 2   Standing Up From Chair 2   Walk in Room 2   Climb 3-5 Stairs 1   Basic Mobility Inpatient Raw Score 13   Basic Mobility Standardized Score 33 99   Highest Level Of Mobility   JH-HLM Goal 4: Move to chair/commode   JH-HLM Achieved 6: Walk 10 steps or more   Education   Education Provided Mobility training;Assistive device   Patient Demonstrates acceptance/verbal understanding   End of Consult   Patient Position at End of Consult Bedside chair; All needs within reach     Adrienne Donato, PT, DPT

## 2022-12-06 NOTE — PLAN OF CARE
Problem: PAIN - ADULT  Goal: Verbalizes/displays adequate comfort level or baseline comfort level  Description: Interventions:  - Encourage patient to monitor pain and request assistance  - Assess pain using appropriate pain scale  - Administer analgesics based on type and severity of pain and evaluate response  - Implement non-pharmacological measures as appropriate and evaluate response  - Consider cultural and social influences on pain and pain management  - Notify physician/advanced practitioner if interventions unsuccessful or patient reports new pain  Outcome: Progressing     Problem: SAFETY ADULT  Goal: Patient will remain free of falls  Description: INTERVENTIONS:  - Educate patient/family on patient safety including physical limitations  - Instruct patient to call for assistance with activity   - Consult OT/PT to assist with strengthening/mobility   - Keep Call bell within reach  - Keep bed low and locked with side rails adjusted as appropriate  - Keep care items and personal belongings within reach  - Initiate and maintain comfort rounds  - Make Fall Risk Sign visible to staff  - Apply yellow socks and bracelet for high fall risk patients  - Consider moving patient to room near nurses station  Outcome: Progressing     Problem: Potential for Falls  Goal: Patient will remain free of falls  Description: INTERVENTIONS:  - Educate patient/family on patient safety including physical limitations  - Instruct patient to call for assistance with activity   - Consult OT/PT to assist with strengthening/mobility   - Keep Call bell within reach  - Keep bed low and locked with side rails adjusted as appropriate  - Keep care items and personal belongings within reach  - Initiate and maintain comfort rounds  - Make Fall Risk Sign visible to staff  -- Apply yellow socks and bracelet for high fall risk patients  - Consider moving patient to room near nurses station  Outcome: Progressing

## 2022-12-07 LAB
ATRIAL RATE: 114 BPM
ATRIAL RATE: 115 BPM
GLUCOSE SERPL-MCNC: 145 MG/DL (ref 65–140)
GLUCOSE SERPL-MCNC: 169 MG/DL (ref 65–140)
GLUCOSE SERPL-MCNC: 172 MG/DL (ref 65–140)
GLUCOSE SERPL-MCNC: 72 MG/DL (ref 65–140)
P AXIS: 24 DEGREES
P AXIS: 29 DEGREES
PR INTERVAL: 158 MS
PR INTERVAL: 158 MS
QRS AXIS: -4 DEGREES
QRS AXIS: -9 DEGREES
QRSD INTERVAL: 132 MS
QRSD INTERVAL: 132 MS
QT INTERVAL: 346 MS
QT INTERVAL: 356 MS
QTC INTERVAL: 478 MS
QTC INTERVAL: 490 MS
T WAVE AXIS: 2 DEGREES
T WAVE AXIS: 7 DEGREES
VENTRICULAR RATE: 114 BPM
VENTRICULAR RATE: 115 BPM

## 2022-12-07 RX ORDER — METOPROLOL TARTRATE 5 MG/5ML
5 INJECTION INTRAVENOUS EVERY 8 HOURS PRN
Status: DISCONTINUED | OUTPATIENT
Start: 2022-12-07 | End: 2022-12-14 | Stop reason: HOSPADM

## 2022-12-07 RX ADMIN — ACETAMINOPHEN 975 MG: 325 TABLET ORAL at 13:05

## 2022-12-07 RX ADMIN — PANTOPRAZOLE SODIUM 40 MG: 40 TABLET, DELAYED RELEASE ORAL at 05:19

## 2022-12-07 RX ADMIN — ATORVASTATIN CALCIUM 40 MG: 40 TABLET, FILM COATED ORAL at 09:18

## 2022-12-07 RX ADMIN — ACETAMINOPHEN 975 MG: 325 TABLET ORAL at 21:31

## 2022-12-07 RX ADMIN — INSULIN LISPRO 4 UNITS: 100 INJECTION, SOLUTION INTRAVENOUS; SUBCUTANEOUS at 12:32

## 2022-12-07 RX ADMIN — CALCIUM CARBONATE (ANTACID) CHEW TAB 500 MG 500 MG: 500 CHEW TAB at 13:57

## 2022-12-07 RX ADMIN — TAMSULOSIN HYDROCHLORIDE 0.4 MG: 0.4 CAPSULE ORAL at 17:23

## 2022-12-07 RX ADMIN — ENOXAPARIN SODIUM 40 MG: 40 INJECTION SUBCUTANEOUS at 17:23

## 2022-12-07 RX ADMIN — SODIUM CHLORIDE 1000 ML: 0.9 INJECTION, SOLUTION INTRAVENOUS at 05:18

## 2022-12-07 RX ADMIN — METHOCARBAMOL TABLETS 500 MG: 500 TABLET, COATED ORAL at 09:18

## 2022-12-07 RX ADMIN — OXYCODONE HYDROCHLORIDE 10 MG: 10 TABLET ORAL at 15:24

## 2022-12-07 RX ADMIN — PANTOPRAZOLE SODIUM 40 MG: 40 TABLET, DELAYED RELEASE ORAL at 17:23

## 2022-12-07 RX ADMIN — OXYCODONE HYDROCHLORIDE 5 MG: 5 TABLET ORAL at 03:38

## 2022-12-07 RX ADMIN — INSULIN LISPRO 4 UNITS: 100 INJECTION, SOLUTION INTRAVENOUS; SUBCUTANEOUS at 17:23

## 2022-12-07 RX ADMIN — INSULIN LISPRO 2 UNITS: 100 INJECTION, SOLUTION INTRAVENOUS; SUBCUTANEOUS at 17:24

## 2022-12-07 RX ADMIN — AMLODIPINE BESYLATE 5 MG: 5 TABLET ORAL at 09:18

## 2022-12-07 RX ADMIN — ASPIRIN 81 MG CHEWABLE TABLET 81 MG: 81 TABLET CHEWABLE at 09:18

## 2022-12-07 RX ADMIN — INSULIN LISPRO 2 UNITS: 100 INJECTION, SOLUTION INTRAVENOUS; SUBCUTANEOUS at 12:33

## 2022-12-07 RX ADMIN — CHOLECALCIFEROL TAB 10 MCG (400 UNIT) 400 UNITS: 10 TAB at 09:18

## 2022-12-07 RX ADMIN — Medication 1 TABLET: at 09:18

## 2022-12-07 RX ADMIN — METHOCARBAMOL TABLETS 500 MG: 500 TABLET, COATED ORAL at 17:23

## 2022-12-07 RX ADMIN — INSULIN LISPRO 4 UNITS: 100 INJECTION, SOLUTION INTRAVENOUS; SUBCUTANEOUS at 09:18

## 2022-12-07 RX ADMIN — ENOXAPARIN SODIUM 40 MG: 40 INJECTION SUBCUTANEOUS at 09:18

## 2022-12-07 RX ADMIN — LISINOPRIL 40 MG: 20 TABLET ORAL at 09:18

## 2022-12-07 RX ADMIN — ACETAMINOPHEN 975 MG: 325 TABLET ORAL at 05:19

## 2022-12-07 RX ADMIN — AMLODIPINE BESYLATE 5 MG: 5 TABLET ORAL at 17:23

## 2022-12-07 NOTE — PROGRESS NOTES
Documented in the note from 12/6                                        CLARIFY DIAGNOSIS RESPONSE NOTE    Based on the query that was sent to you, please clarify the diagnosis below

## 2022-12-07 NOTE — UTILIZATION REVIEW
NOTIFICATION OF OBSERVATION ADMISSION   AUTHORIZATION REQUEST   SERVICING FACILITY:   [facility]  Address: [address] [place] [address], [address] [place] [address]  Tax ID: [id number]  NPI: [id number] ATTENDING PROVIDER:  Attending Name and NPI#: Sapna Parmar Md [[id number]]  Address: [address] [place] [address]  Phone: [phone]   ADMISSION INFORMATION:  Place of Service: On [address] [facility] Code: 22 CPT Code:   Admitting Diagnosis Code/Description:  Altered bowel elimination due to intestinal ostomy [facility]) [K94 19]  Observation Admission Date/Time:   Discharge Date/Time: No discharge date for patient encounter  UTILIZATION REVIEW CONTACT:  Martin Munoz Utilization   Network Utilization Review Department  Phone: [phone]  Fax: [phone]  Email: [email or web address]   Contact for approvals/pending authorizations, clinical reviews, and discharge  PHYSICIAN ADVISORY SERVICES:  Medical Necessity Denial & [id number]-[id number]-Ollv Review  Phone: [phone]  Fax: [phone]  Email: [email or web address]

## 2022-12-07 NOTE — PROGRESS NOTES
Lungs remain clear but decreased, no cough, feels a bit SOB, HOB45 degrees at least and boosted for comfort, sats on r/a had been 88-90% placed on o2 at 2L for comfort, pt seen by resident, EKG obtained as per order and pt aware he will be for CXR as well, no c/o pain or discomfort at this time, pt remains nontelemetry for now, will continue to monitor on reva

## 2022-12-07 NOTE — RESPIRATORY THERAPY NOTE
RT Protocol Note  Celena Herrera 61 y o  male MRN: [id number]  Unit/Bed#: [facility] 906-01 Encounter: [id number]    Assessment    Principal Problem:    Malignant neoplasm of transverse colon [facility])      Home Pulmonary Medications:  none       Past Medical History:   Diagnosis Date    Abdominal pain 03/12/2022    Acute renal failure ([place] [address] )     [id number] resolved    Cancer ([place] [address] )     Diabetes mellitus ([place] [address] )     Enteritis 08/23/2016    Gastroparesis due to DM ([place] [address] ) 08/23/2016    GERD (gastroesophageal reflux disease)     Hernia, ventral 08/04/2016    Hyperlipidemia     Hypertension     Morbid obesity ([place] [address] ) 04/17/2018    Postoperative visit 03/02/2022    SIRS (systemic inflammatory response syndrome) ([place] [address] ) 03/12/2022    Snoring     Stage 3a chronic kidney disease ([place] [address] ) 02/19/2022     Social History     Socioeconomic History    Marital status: /Civil Union     Spouse name: Not on file    Number of children: Not on file    Years of education: Not on file    Highest education level: Not on file   Occupational History    Occupation: ACTOR     Employer: [employer]   Tobacco Use    Smoking status: Never    Smokeless tobacco: Never   Vaping Use    Vaping Use: Never used   Substance and Sexual Activity    Alcohol use: Never    Drug use: No    Sexual activity: Yes     Partners: Female   Other Topics Concern    Not on file   Social History Narrative    Not on file     Social Determinants of Health     Financial Resource Strain: Not on file   Food Insecurity: No Food Insecurity    Worried About Running Out of Food in the Last Year: Never true    [address] in the Last Year: Never true   Transportation Needs: No Transportation Needs    Lack of Transportation (Medical): No    Lack of Transportation (Non-Medical):  No   Physical Activity: Insufficiently Active    Days of Exercise per Week: 5 days    Minutes of Exercise per Session: 10 min   Stress: No Stress Concern Present    Feeling of Stress : Not at all   Social Connections: Not on file   Intimate Partner Violence: Not on file   Housing Stability: Low Risk     Unable to Pay for Housing in the Last Year: No    Number of Places Lived in the Last Year: 1    Unstable Housing in the Last Year: No       Subjective         Objective    Physical Exam:   Assessment Type: Assess only  General Appearance: Alert, Awake  Respiratory Pattern: Normal, Dyspnea with exertion  Chest Assessment: Chest expansion symmetrical  Bilateral Breath Sounds: Clear (little decreased at bases)  Cough: None    Vitals:  Blood pressure 159/95, pulse (!) 111, temperature (!) 97 °F (36 1 °C), resp  rate 16, height 5' 7 99" (1 727 m), weight 103 kg (226 lb 3 1 oz), SpO2 93 %  Imaging and other studies: I have personally reviewed pertinent reports              Plan    Respiratory Plan: Discontinue Protocol        Resp Comments: assessed per resp protocol; pt has recent abd surgery with colostomy, ileostomy then right hemicolectomy; c/o pain but says breathing is fine; gets  SOB with activity, pain; has no pulm hx nor pulm home meds; doing well with IS; encouraged to use every hour; no indication for bronchodilators; will dc protocol

## 2022-12-07 NOTE — RESTORATIVE TECHNICIAN NOTE
Restorative Technician Note      Patient Name: Eloise Mane     Restorative Tech Visit Date: 12/07/22  Note Type: Mobility  Patient Position Upon Consult: Supine  Activity Performed: Ambulated  Assistive Device: Roller walker  Patient Position at End of Consult: Bedside chair;  All needs within [facility]

## 2022-12-07 NOTE — PROGRESS NOTES
Pt is currently asleep, o2 on at 2L sats 94%, and HR's still tachy low 100's(currently 108), will continue to monitor

## 2022-12-07 NOTE — PROGRESS NOTES
IVF bolus infusing as ordered, HR's remain 110's at rest, will continue to monitor, sats 92-93% on 2L

## 2022-12-07 NOTE — PLAN OF CARE
Problem: OCCUPATIONAL THERAPY ADULT  Goal: Performs self-care activities at highest level of function for planned discharge setting  See evaluation for individualized goals  Description: Treatment Interventions: ADL retraining, Functional transfer training, Endurance training, UE strengthening/ROM, Cognitive reorientation, Patient/family training, Equipment evaluation/education, Fine motor coordination activities, Compensatory technique education, Continued evaluation, Activityengagement, Energy conservation          See flowsheet documentation for full assessment, interventions and recommendations  Outcome: Progressing  Note: Limitation: Decreased ADL status, Decreased UE ROM, Decreased UE strength, Decreased cognition, Decreased endurance, Decreased self-care trans, Decreased high-level ADLs, Decreased fine motor control  Prognosis: Good  Assessment: Patient participated in Skilled OT session this date with interventions consisting of ADL re training with the use of correct body mechnaics, Energy Conservation techniques, safety awareness and fall prevention techniques,  therapeutic activities to: increase activity tolerance, increase dynamic sit/ stand balance during functional activity  and increase OOB/ sitting tolerance   Upon arrival patient was found supine in bed  Pt demonstrated the following tasks: MOD A sup <> sit, STS, and fnxl mobility with RW  Pt performs grooming independently, S for UB ADLs, and MOD A for LB ADLs  Pt noted to have HR primarily in 120s during session, 138 with fnxl mobility  O2 stable at 94-95% on RA t/o session  RN aware  Patient continues to be functioning below baseline level, occupational performance remains limited secondary to factors listed above and increased risk for falls and injury  From OT standpoint, recommendation at time of d/c would be STR    Patient to benefit from continued Occupational Therapy treatment while in the hospital to address deficits as defined above and maximize level of functional independence with ADLs and functional mobility  Pt was left after session with all current needs met  The patient's raw score on the AM-PAC Daily Activity inpatient short form is 18, standardized score is 38 66, less than 39 4  Patients at this level are likely to benefit from discharge to post-acute rehabilitation services  Please refer to the recommendation of the Occupational Therapist for safe discharge planning       OT Discharge Recommendation: Post acute rehabilitation services

## 2022-12-07 NOTE — PROGRESS NOTES
Progress Note - Surgical Oncology  Savage Holloway 61 y o  male MRN: [id number]  [facility] Encounter: [id number]      Objective: Patient complaining of pain this morning  States he is happy that he was able to get out of bed on his own yesterday  Patient is more physically active  Patient states he only drank 1 Ensure yesterday was too exhausted to eat yesterday  Patient is now on 2 L nasal cannula and satting approximately 95%, he was 90% on room air earlier  Chest x-ray was done earlier this morning read is still pending, there is to have diminished lung capacity bilaterally  More so on the left  Patient's ileostomy is functioning well  He has been tachycardic throughout the night up to 124 bpm when I saw him this morning  Patient would prefer to have his midline wound changed later this morning when he is more awake  Patient has been approved for [facility] at [facility] here  Patient has been tested COVID-negative  1425 UA  350 ileostomy    Blood pressure 161/97, pulse (!) 124, temperature (!) 97 2 °F (36 2 °C), resp  rate 16, height 5' 7 99" (1 727 m), weight 103 kg (226 lb 3 1 oz), SpO2 90 %  ,Body mass index is 34 4 kg/m²  Intake/Output Summary (Last 24 hours) at 12/7/2022 0515  Last data filed at 12/7/2022 0301  Gross per 24 hour   Intake 1000 ml   Output 1775 ml   Net -775 ml       Invasive Devices     Central Venous Catheter Line  Duration           Port A Cath 03/10/22 Right Chest 271 days          Peripheral Intravenous Line  Duration           Peripheral IV 12/06/22 Left;Ventral (anterior) Wrist 1 day          Drain  Duration           Ileostomy LUQ 19 days                Physical Exam:   Awoke patient this morning  He is orientated x3    Complaining of pain since doing more physical activity yesterday  Abdomen: Soft, nondistended, midline dressing as well as right abdominal dressing will be changed later this morning by myself, ileostomy with soft stool in the bag, minimal abdominal tenderness to palpation  Extremities: No pedal edema or lower extremity edema, no calf tenderness bilaterally, no upper extremity edema    Lab, Imaging and other studies: Pending  VTE Pharmacologic Prophylaxis: Enoxaparin (Lovenox)  VTE Mechanical Prophylaxis: sequential compression device    Assessment:  Status post exploratory laparotomy with segment 3 liver resection, transverse colectomy, left colectomy, ileocolonic anastomosis, diverting loop ileostomy  History of diabetes type 2    Plan:  1  Giving 1 L of normal saline over 2 hours for tachycardia  2  Await chest x-ray read, ? Left pleural effusion  3  To continue out of bed and ambulating the halls and working with physical therapy  4  Monitor urine output as well as ileostomy output  5  Encourage oral intake  6  Continue Glucerna 3 times daily  7  Check a m  labs  8    Continue to work with case management for DC planning

## 2022-12-07 NOTE — OCCUPATIONAL THERAPY NOTE
Occupational Therapy Progress Note     Patient Name: Darrion Grant  SXGAT'N Date: 12/7/2022  Problem List  Principal Problem:    Malignant neoplasm of transverse colon ([address] )          12/07/22 1507   OT Last Visit   OT Visit Date 12/07/22   Note Type   Note Type Treatment   Pain Assessment   Pain Assessment Tool 0-10   Pain Score No Pain   Effect of Pain on Daily Activities No pain at initiation of session, does c/o nausea   Hospital Pain Intervention(s) Repositioned; Ambulation/increased activity   Restrictions/Precautions   Other Precautions Multiple lines; Fall Risk  (ostomy)   Lifestyle   Autonomy At baseline, pt was I with ADLs, IADLs, fnxl mobility, (+)    Reciprocal Relationships Wife, 2 kids   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   Intrinsic Gratification Yardwork   ADL   Where Assessed Edge of bed   Grooming Assistance 7  Independent   Grooming Deficit Wash/dry face;Brushing hair;Teeth care   Grooming Comments + washing hair with shampoo cap   UB Bathing Assistance 5  Supervision/Setup   UB Bathing Deficit Right arm;Left arm; Chest;Abdomen   LB Bathing Assistance 3  Moderate Assistance   LB Bathing Deficit Buttocks   LB Bathing Comments pt able to wash B/L feet in tailor sit position, A for thoroughness   UB Dressing Assistance 5  Supervision/Setup   UB Dressing Deficit Thread RUE; Thread LUE   LB Dressing Assistance 3  Moderate Assistance   LB Dressing Deficit Don/doff R sock; Don/doff L sock   Bed Mobility   Supine to Sit 3  Moderate assistance   Additional items Assist x 1;HOB elevated; Bedrails; Increased time required;LE management   Sit to Supine 3  Moderate assistance   Additional items Assist x 1;HOB elevated; Bedrails; Increased time required;LE management   Transfers   Sit to Stand 3  Moderate assistance   Additional items Assist x 1; Increased time required   Stand to Sit 3  Moderate assistance   Additional items Assist x 1; Increased time required   Additional Comments transfers with RW Functional Mobility   Functional Mobility 3  Moderate assistance   Additional Comments Ax1 + SBA of another   Additional items Rolling walker   Cognition   Overall Cognitive Status WFL   Arousal/Participation Responsive; Cooperative   Attention Attends with cues to redirect   Orientation Level Oriented X4   Following Commands Follows one step commands with increased time or repetition   Comments Pt cooperative t/o session   Activity Tolerance   Activity Tolerance Patient limited by fatigue   Medical Staff Made Aware RN Karen Kwong, Jhonny Mcdaniel   Assessment   Assessment Patient participated in Skilled OT session this date with interventions consisting of ADL re training with the use of correct body mechnaics, Energy Conservation techniques, safety awareness and fall prevention techniques,  therapeutic activities to: increase activity tolerance, increase dynamic sit/ stand balance during functional activity  and increase OOB/ sitting tolerance   Upon arrival patient was found supine in bed  Pt demonstrated the following tasks: MOD A sup <> sit, STS, and fnxl mobility with RW  Pt performs grooming independently, S for UB ADLs, and MOD A for LB ADLs  Pt noted to have HR primarily in 120s during session, 138 with fnxl mobility  O2 stable at 94-95% on RA t/o session  RN aware  Patient continues to be functioning below baseline level, occupational performance remains limited secondary to factors listed above and increased risk for falls and injury  From OT standpoint, recommendation at time of d/c would be STR  Patient to benefit from continued Occupational Therapy treatment while in the hospital to address deficits as defined above and maximize level of functional independence with ADLs and functional mobility  Pt was left after session with all current needs met  The patient's raw score on the AM-PAC Daily Activity inpatient short form is 18, standardized score is 38 66, less than 39 4   Patients at this level are likely to benefit from discharge to post-acute rehabilitation services  Please refer to the recommendation of the Occupational Therapist for safe discharge planning  Plan   Treatment Interventions ADL retraining;Functional transfer training;UE strengthening/ROM; Cognitive reorientation; Endurance training;Patient/family training;Equipment evaluation/education; Compensatory technique education;Continued evaluation; Energy conservation; Activityengagement   Goal Expiration Date 12/19/22   OT Treatment Day 1   OT Frequency   (2-4x/wk)   Recommendation   OT Discharge Recommendation Post acute rehabilitation services   AM-PAC Daily Activity Inpatient   Lower Body Dressing 2   Bathing 2   Toileting 3   Upper Body Dressing 3   Grooming 4   Eating 4   Daily Activity Raw Score 18   Daily Activity Standardized Score (Calc for Raw Score >=11) 38 66   AM-PAC Applied Cognition Inpatient   Following a Speech/Presentation 3   Understanding Ordinary Conversation 4   Taking Medications 4   Remembering Where Things Are Placed or Put Away 4   Remembering List of 4-5 Errands 3   Taking Care of Complicated Tasks 3   Applied Cognition Raw Score 21   Applied Cognition Standardized Score 44 3     Iram Goldberg MS, OTR/L

## 2022-12-07 NOTE — PROGRESS NOTES
Pt called for help with urinal, voided large amount, after voiding bedding saturated and pt c/o increased pain with movement, medicated for pain, abdominal dressing remains intact, ileostomy emptied as well, pt stayed in bed for urinal use and HR's still to 120's with rolling side to side in bed, sats remained in the 90's on o2 at 2L, will continue to monitor, sacrum is intact with some dried excoriation noted, repositioning, but right now wants to let pain medicine kick in first, boosted in bed for comfort

## 2022-12-08 ENCOUNTER — APPOINTMENT (OUTPATIENT)
Dept: RADIOLOGY | Facility: HOSPITAL | Age: 57
End: 2022-12-08

## 2022-12-08 ENCOUNTER — APPOINTMENT (INPATIENT)
Dept: RADIOLOGY | Facility: HOSPITAL | Age: 57
End: 2022-12-08

## 2022-12-08 LAB
BASOPHILS # BLD AUTO: 0.06 THOUSANDS/ÂΜL (ref 0–0.1)
BASOPHILS NFR BLD AUTO: 0 % (ref 0–1)
EOSINOPHIL # BLD AUTO: 0.09 THOUSAND/ÂΜL (ref 0–0.61)
EOSINOPHIL NFR BLD AUTO: 0 % (ref 0–6)
ERYTHROCYTE [DISTWIDTH] IN BLOOD BY AUTOMATED COUNT: 16.2 % (ref 11.6–15.1)
GLUCOSE SERPL-MCNC: 135 MG/DL (ref 65–140)
GLUCOSE SERPL-MCNC: 146 MG/DL (ref 65–140)
GLUCOSE SERPL-MCNC: 153 MG/DL (ref 65–140)
GLUCOSE SERPL-MCNC: 159 MG/DL (ref 65–140)
HCT VFR BLD AUTO: 29.2 % (ref 36.5–49.3)
HGB BLD-MCNC: 9.2 G/DL (ref 12–17)
IMM GRANULOCYTES # BLD AUTO: 0.47 THOUSAND/UL (ref 0–0.2)
IMM GRANULOCYTES NFR BLD AUTO: 2 % (ref 0–2)
LYMPHOCYTES # BLD AUTO: 1.11 THOUSANDS/ÂΜL (ref 0.6–4.47)
LYMPHOCYTES NFR BLD AUTO: 4 % (ref 14–44)
MCH RBC QN AUTO: 27.6 PG (ref 26.8–34.3)
MCHC RBC AUTO-ENTMCNC: 31.5 G/DL (ref 31.4–37.4)
MCV RBC AUTO: 88 FL (ref 82–98)
MONOCYTES # BLD AUTO: 2.39 THOUSAND/ÂΜL (ref 0.17–1.22)
MONOCYTES NFR BLD AUTO: 8 % (ref 4–12)
NEUTROPHILS # BLD AUTO: 24.17 THOUSANDS/ÂΜL (ref 1.85–7.62)
NEUTS SEG NFR BLD AUTO: 86 % (ref 43–75)
NRBC BLD AUTO-RTO: 0 /100 WBCS
PLATELET # BLD AUTO: 416 THOUSANDS/UL (ref 149–390)
PMV BLD AUTO: 10.7 FL (ref 8.9–12.7)
RBC # BLD AUTO: 3.33 MILLION/UL (ref 3.88–5.62)
WBC # BLD AUTO: 28.29 THOUSAND/UL (ref 4.31–10.16)

## 2022-12-08 PROCEDURE — 2W23X4Z DRESSING OF ABDOMINAL WALL USING BANDAGE: ICD-10-PCS | Performed by: SURGERY

## 2022-12-08 PROCEDURE — 0F9430Z DRAINAGE OF GALLBLADDER WITH DRAINAGE DEVICE, PERCUTANEOUS APPROACH: ICD-10-PCS | Performed by: STUDENT IN AN ORGANIZED HEALTH CARE EDUCATION/TRAINING PROGRAM

## 2022-12-08 RX ORDER — SODIUM CHLORIDE, SODIUM LACTATE, POTASSIUM CHLORIDE, CALCIUM CHLORIDE 600; 310; 30; 20 MG/100ML; MG/100ML; MG/100ML; MG/100ML
125 INJECTION, SOLUTION INTRAVENOUS CONTINUOUS
Status: DISCONTINUED | OUTPATIENT
Start: 2022-12-08 | End: 2022-12-09

## 2022-12-08 RX ORDER — LIDOCAINE HYDROCHLORIDE 10 MG/ML
INJECTION, SOLUTION EPIDURAL; INFILTRATION; INTRACAUDAL; PERINEURAL AS NEEDED
Status: COMPLETED | OUTPATIENT
Start: 2022-12-08 | End: 2022-12-12

## 2022-12-08 RX ADMIN — LIDOCAINE HYDROCHLORIDE 10 ML: 10 INJECTION, SOLUTION EPIDURAL; INFILTRATION; INTRACAUDAL; PERINEURAL at 18:31

## 2022-12-08 RX ADMIN — ACETAMINOPHEN 975 MG: 325 TABLET ORAL at 14:05

## 2022-12-08 RX ADMIN — PIPERACILLIN AND TAZOBACTAM 4.5 G: 36; 4.5 INJECTION, POWDER, FOR SOLUTION INTRAVENOUS at 21:41

## 2022-12-08 RX ADMIN — AMLODIPINE BESYLATE 5 MG: 5 TABLET ORAL at 16:38

## 2022-12-08 RX ADMIN — ACETAMINOPHEN 975 MG: 325 TABLET ORAL at 05:08

## 2022-12-08 RX ADMIN — PANTOPRAZOLE SODIUM 40 MG: 40 TABLET, DELAYED RELEASE ORAL at 06:15

## 2022-12-08 RX ADMIN — PIPERACILLIN AND TAZOBACTAM 4.5 G: 36; 4.5 INJECTION, POWDER, FOR SOLUTION INTRAVENOUS at 16:38

## 2022-12-08 RX ADMIN — AMLODIPINE BESYLATE 5 MG: 5 TABLET ORAL at 08:26

## 2022-12-08 RX ADMIN — METHOCARBAMOL TABLETS 500 MG: 500 TABLET, COATED ORAL at 16:38

## 2022-12-08 RX ADMIN — ATORVASTATIN CALCIUM 40 MG: 40 TABLET, FILM COATED ORAL at 08:26

## 2022-12-08 RX ADMIN — PANTOPRAZOLE SODIUM 40 MG: 40 TABLET, DELAYED RELEASE ORAL at 16:37

## 2022-12-08 RX ADMIN — SODIUM CHLORIDE, POTASSIUM CHLORIDE, SODIUM LACTATE AND CALCIUM CHLORIDE 125 ML/HR: 600; 310; 30; 20 INJECTION, SOLUTION INTRAVENOUS at 06:56

## 2022-12-08 RX ADMIN — ENOXAPARIN SODIUM 40 MG: 40 INJECTION SUBCUTANEOUS at 08:28

## 2022-12-08 RX ADMIN — Medication 1 TABLET: at 08:26

## 2022-12-08 RX ADMIN — OXYCODONE HYDROCHLORIDE 10 MG: 10 TABLET ORAL at 16:37

## 2022-12-08 RX ADMIN — TAMSULOSIN HYDROCHLORIDE 0.4 MG: 0.4 CAPSULE ORAL at 16:37

## 2022-12-08 RX ADMIN — SODIUM CHLORIDE, POTASSIUM CHLORIDE, SODIUM LACTATE AND CALCIUM CHLORIDE 125 ML/HR: 600; 310; 30; 20 INJECTION, SOLUTION INTRAVENOUS at 16:38

## 2022-12-08 RX ADMIN — LIDOCAINE HYDROCHLORIDE 10 ML: 10 INJECTION, SOLUTION EPIDURAL; INFILTRATION; INTRACAUDAL; PERINEURAL at 18:58

## 2022-12-08 RX ADMIN — IOHEXOL 100 ML: 350 INJECTION, SOLUTION INTRAVENOUS at 10:56

## 2022-12-08 RX ADMIN — ONDANSETRON 4 MG: 4 TABLET, ORALLY DISINTEGRATING ORAL at 04:40

## 2022-12-08 RX ADMIN — LISINOPRIL 40 MG: 20 TABLET ORAL at 08:26

## 2022-12-08 RX ADMIN — ASPIRIN 81 MG CHEWABLE TABLET 81 MG: 81 TABLET CHEWABLE at 08:28

## 2022-12-08 RX ADMIN — METHOCARBAMOL TABLETS 500 MG: 500 TABLET, COATED ORAL at 08:26

## 2022-12-08 RX ADMIN — CHOLECALCIFEROL TAB 10 MCG (400 UNIT) 400 UNITS: 10 TAB at 08:26

## 2022-12-08 RX ADMIN — INSULIN GLARGINE 15 UNITS: 100 INJECTION, SOLUTION SUBCUTANEOUS at 21:41

## 2022-12-08 RX ADMIN — ACETAMINOPHEN 975 MG: 325 TABLET ORAL at 21:42

## 2022-12-08 RX ADMIN — OXYCODONE HYDROCHLORIDE 10 MG: 10 TABLET ORAL at 08:27

## 2022-12-08 RX ADMIN — METOROPROLOL TARTRATE 5 MG: 5 INJECTION, SOLUTION INTRAVENOUS at 05:08

## 2022-12-08 NOTE — TELEMEDICINE
e-Consult (IPC)  - Interventional Radiology  Moiz Steinberg 61 y o  male MRN: [id number]  Unit/Bed#: [facility] 906-01 Encounter: [id number]          Interventional Radiology has been consulted to evaluate Moiz Steinberg    We were consulted by surgery concerning this patient with abdominal abscess as well as likely cholecystitis  Case discussed and imaging reviewed with Dr Kelly Spain of IR  IP Consult to IR  Consult performed by: Odalis Peraza PA-C  Consult ordered by: Hayde Magallon MD        12/08/22    Assessment/Recommendation:   43-GOBD-JLC male with complicated abdominal surgical history significant for bowel resection and transverse colon neoplasm and ileostomy  Segment 3 liver resection, colectomy  Abdominal abscess and distended gallbladder  -Plan for percutaneous abscess drainage tonight   -Plan for percutaneous cholecystostomy tonight   -N p o      21-30 minutes, >50% of the total time devoted to medical consultative verbal/EMR discussion between providers  Written report will be generated in the EMR  Thank you for allowing Interventional Radiology to participate in the care of Moiz Steinberg  Please don't hesitate to call or TigerText us with any questions       Odalis Peraza PA-C

## 2022-12-08 NOTE — PROGRESS NOTES
Abdominal dressing and maxorb changed due to drainage leaking through dressings and around ileostomy  Pt c/o nausea after dressing change  Zofran given  Nausea relieved  Pt anxious and weepy  Offered emotional support and increased comfort rounds  Heart rate 116

## 2022-12-08 NOTE — UTILIZATION REVIEW
OBS 12/4 @ 0024 UPGRADED TO INPATIENT 12/7 @ 1508 FOR CONTINED TX OF PAIN MANAGEMENT, WOUND CARE AND NOW WITH TACHYCARDIA    12/07/22 1509  Inpatient Admission  Once        Transfer Service: Oncology-Medical       Question Answer Comment   Level of Care Med Surg        12/07/22 1508     Continued Stay Review    Date:    12/7                     Current Patient Class: Inpatient Current Level of Care: med/surg    HPI:57 y o  male initially admitted on 12/4 obs to inpatient 12/7      Assessment/Plan:  Pt with c/o pain this AM  Was OOB to chair yesterday  States he was too tired to eat yesterday, only had 1 Ensure  Currently on 2L O2 for sat of 90% on RA yesterday  CXR this AM pending  Ileostomy if functioning well  R abdominal dressing will be changed later today  Pt tachycardic  1L NSS over 2 hrs for tachycardia  Continue OOB/ambulate, working with PT  Encourage oral intake  I/Os, monitor ileostomy output  Continue Glucerna TID  Labs in AM  Possible d/c to [facility] when medically stable      Vital Signs:   Date/Time Temp Pulse Resp BP MAP (mmHg) SpO2 Calculated FIO2 (%) - Nasal Cannula Nasal Cannula O2 Flow Rate (L/min) O2 Device   12/07/22 1700 -- -- -- 155/95 -- -- -- -- --   12/07/22 1353 -- -- -- -- -- 94 % -- -- None (Room air)   12/07/22 0900 -- -- -- -- -- -- -- -- Nasal cannula   12/07/22 07:39:52 97 °F (36 1 °C) Abnormal  111 Abnormal  16 159/95 116 93 % -- -- --   12/07/22 0049 -- -- -- -- -- -- 28 2 L/min --   12/06/22 21:54:50 97 2 °F (36 2 °C) Abnormal  124 Abnormal  -- 161/97 118 90 % -- -- --   12/06/22 2000 -- 113 Abnormal  -- -- -- 92 % -- -- --   12/06/22 15:19:43 97 7 °F (36 5 °C) 117 Abnormal  16 140/85 103 92 % -- -- --   12/06/22 07:46:45 97 3 °F (36 3 °C) Abnormal  104 17 160/86 111 91 % -- -- --       Pertinent Labs/Diagnostic Results:   Results from last 7 days   Lab Units 12/06/22  1131   SARS-COV-2  Negative     Results from last 7 days   Lab Units 12/06/22  0037 12/05/22  6349 12/04/22  6027 12/01/22  0832   WBC Thousand/uL 10 53* 12 98* 17 51* 9 55   HEMOGLOBIN g/dL 10 5* 8 3* 8 3* 8 1*   HEMATOCRIT % 33 6* 27 7* 26 8* 25 6*   PLATELETS Thousands/uL 361 415* 428* 448*   NEUTROS ABS Thousands/µL  --  10 22* 14 34* 7 58     Results from last 7 days   Lab Units 12/06/22  1018 12/06/22  0037 12/05/22  0925 12/04/22  0542 12/01/22  0832   SODIUM mmol/L 135 136 139 138 141   POTASSIUM mmol/L 4 1 3 8 3 8 4 5 3 7   CHLORIDE mmol/L 110* 110* 112* 112* 115*   CO2 mmol/L 18* 20* 20* 19* 18*   ANION GAP mmol/L 7 6 7 7 8   BUN mg/dL 18 18 19 17 19   CREATININE mg/dL 1 25 1 33* 1 23 1 16 0 95   EGFR ml/min/1 73sq m 63 58 64 69 87   CALCIUM mg/dL 8 5 8 2* 8 5 8 1* 8 4   PHOSPHORUS mg/dL 3 5  --   --   --   --          Results from last 7 days   Lab Units 12/07/22  1611 12/07/22  1127 12/07/22  0737 12/06/22  2045 12/06/22  1606 12/06/22  1120 12/06/22  0821 12/05/22  2102 12/05/22  1659 12/05/22  1100 12/05/22  0907 12/05/22  0727   POC GLUCOSE mg/dl 169* 172* 145* 125 130 172* 201* 191* 203* 164* 174* 164*     Results from last 7 days   Lab Units 12/06/22  1018 12/06/22  0037 12/05/22  0925 12/04/22  0542 12/01/22  0832   GLUCOSE RANDOM mg/dL 207* 195* 180* 171* 153*       Results from last 7 days   Lab Units 12/06/22  1131   INFLUENZA A PCR  Negative   INFLUENZA B PCR  Negative   RSV PCR  Negative       Medications:   Scheduled Medications:  acetaminophen, 975 mg, Oral, Q8H SHANNON  amLODIPine, 5 mg, Oral, BID  aspirin, 81 mg, Oral, Daily  atorvastatin, 40 mg, Oral, Daily  cholecalciferol, 400 Units, Oral, Daily  enoxaparin, 40 mg, Subcutaneous, BID  insulin glargine, 15 Units, Subcutaneous, HS  insulin lispro, 2-12 Units, Subcutaneous, TID AC  insulin lispro, 4 Units, Subcutaneous, TID With Meals  lisinopril, 40 mg, Oral, Daily  methocarbamol, 500 mg, Oral, BID  multivitamin-minerals, 1 tablet, Oral, Daily  pantoprazole, 40 mg, Oral, BID AC  tamsulosin, 0 4 mg, Oral, Daily With Dinner    PRN Meds:  calcium carbonate, 500 mg, Oral, BID PRN 12/6 x1, 12/7 x1  HYDROmorphone, 0 5 mg, Intravenous, Q3H PRN 12/6 x2  metoprolol, 5 mg, Intravenous, Q8H PRN  ondansetron, 4 mg, Oral, Q6H PRN 12/6 x1  oxyCODONE, 10 mg, Oral, Q6H PRN 12/6 x3, 12/7 x1  oxyCODONE, 5 mg, Oral, Q6H PRN 12/7 x1        Discharge Plan: TBD    Network Utilization Review Department  ATTENTION: Please call with any questions or concerns to [phone] and carefully listen to the prompts so that you are directed to the right person  All voicemails are confidential   Jhoana Sierra all requests for admission clinical reviews, approved or denied determinations and any other requests to dedicated fax number below belonging to the campus where the patient is receiving treatment   List of dedicated fax numbers for the Facilities:  [address] DENIALS (Administrative/Medical Necessity) [phone]   [address] (Maternity/NICU/Pediatrics) [phone]   3 Ericka Pinto [phone]   [facility] [phone]   [address] [place] [address] [facility] Rosales 28 [phone]   [facility] Janee Pete [facility] [address] [phone]

## 2022-12-08 NOTE — PLAN OF CARE
Problem: PAIN - ADULT  Goal: Verbalizes/displays adequate comfort level or baseline comfort level  Description: Interventions:  - Encourage patient to monitor pain and request assistance  - Assess pain using appropriate pain scale  - Administer analgesics based on type and severity of pain and evaluate response  - Implement non-pharmacological measures as appropriate and evaluate response  - Consider cultural and social influences on pain and pain management  - Notify physician/advanced practitioner if interventions unsuccessful or patient reports new pain  Outcome: Progressing     Problem: INFECTION - ADULT  Goal: Absence or prevention of progression during hospitalization  Description: INTERVENTIONS:  - Assess and monitor for signs and symptoms of infection  - Monitor lab/diagnostic results  - Monitor all insertion sites, i e  indwelling lines, tubes, and drains  - Monitor endotracheal if appropriate and nasal secretions for changes in amount and color  - [place] appropriate cooling/warming therapies per order  - Administer medications as ordered  - Instruct and encourage patient and family to use good hand hygiene technique  - Identify and instruct in appropriate isolation precautions for identified infection/condition  Outcome: Progressing  Goal: Absence of fever/infection during neutropenic period  Description: INTERVENTIONS:  - Monitor WBC    Outcome: Progressing     Problem: SAFETY ADULT  Goal: Patient will remain free of falls  Description: INTERVENTIONS:  - Educate patient/family on patient safety including physical limitations  - Instruct patient to call for assistance with activity   - Consult OT/PT to assist with strengthening/mobility   - Keep Call bell within reach  - Keep bed low and locked with side rails adjusted as appropriate  - Keep care items and personal belongings within reach  - Initiate and maintain comfort rounds  - Make Fall Risk Sign visible to staff  - Offer Toileting every 2 Hours, in advance of need  - Initiate/Maintain bed alarm  - Obtain necessary fall risk management equipment:   - Apply yellow socks and bracelet for high fall risk patients  - Consider moving patient to room near nurses station  Outcome: Progressing     Problem: SKIN/TISSUE INTEGRITY - ADULT  Goal: Skin Integrity remains intact(Skin Breakdown Prevention)  Description: Assess:  -Perform Michael assessment every shift  -Clean and moisturize skin every shift  -Inspect skin when repositioning, toileting, and assisting with ADLS  -Assess under medical devices such as IV site every 2 hours  -Assess extremities for adequate circulation and sensation     Bed Management:  -Have minimal linens on bed & keep smooth, unwrinkled  -Change linens as needed when moist or perspiring  -Avoid sitting or lying in one position for more than 2 hours while in bed  -Keep HOB at 30 degrees     Toileting:  -Offer bedside commode    Activity:  -Mobilize patient 3 times a day  -Encourage activity and walks on unit  -Encourage or provide ROM exercises   -Turn and reposition patient every 2 Hours  -Use appropriate equipment to lift or move patient in bed  -Instruct/ Assist with weight shifting every 4 when out of bed in chair  -Consider limitation of chair time 4 hour intervals    Skin Care:  -Avoid use of baby powder, tape, friction and shearing, hot water or constrictive clothing  -Relieve pressure over bony prominences using allevyn  -Do not massage red bony areas    Next Steps:  -Teach patient strategies to minimize risks such as sliding in bed  -Consider consults to  interdisciplinary teams such as PT  Outcome: Progressing  Goal: Incision(s), wounds(s) or drain site(s) healing without S/S of infection  Description: INTERVENTIONS  - Assess and document dressing, incision, wound bed, drain sites and surrounding tissue  - Provide patient and family education  - Perform skin care/dressing changes every shift and prn  Outcome: Progressing  Goal: Pressure injury heals and does not worsen  Description: Interventions:  - Implement low air loss mattress or specialty surface (Criteria met)  - Apply silicone foam dressing  - Instruct/assist with weight shifting every 30 minutes when in chair   - Limit chair time to 4 hour intervals  - Use special pressure reducing interventions such as seat cushion when in chair   - Apply fecal or urinary incontinence containment device   - Perform passive or active ROM every shift  - Turn and reposition patient & offload bony prominences every 2 hours   - Utilize friction reducing device or surface for transfers   - Consider consults to  interdisciplinary teams such as wound care  - Consider nutrition services referral as needed  Outcome: Progressing     Problem: Prexisting or High Potential for Compromised Skin Integrity  Goal: Skin integrity is maintained or improved  Description: INTERVENTIONS:  - Identify patients at risk for skin breakdown  - Assess and monitor skin integrity  - Assess and monitor nutrition and hydration status  - Monitor labs   - Assess for incontinence   - Turn and reposition patient  - Assist with mobility/ambulation  - Relieve pressure over bony prominences  - Avoid friction and shearing  - Provide appropriate hygiene as needed including keeping skin clean and dry  - Evaluate need for skin moisturizer/barrier cream  - Collaborate with interdisciplinary team   - Patient/family teaching  - Consider wound care consult   Outcome: Progressing

## 2022-12-08 NOTE — PROGRESS NOTES
Interventional Radiology Preprocedure Note    History/Indication for procedure:   Lillie Kaur is a 61 y o  male with a PMH of metastatic CRC s/p seg 3 hepatectomy c/b seg 3 abscess and now acute cholecystitis who presents for abscess drainage and rdaha tube placement  Relevant past medical history:    Past Medical History:   Diagnosis Date   • Abdominal pain 03/12/2022   • Acute renal failure ([place] [address] )     [id number] resolved   • Cancer ([place] [address] )    • Diabetes mellitus ([place] [address] )    • Enteritis 08/23/2016   • [facility] ) 08/23/2016   • GERD (gastroesophageal reflux disease)    • Hernia, ventral 08/04/2016   • Hyperlipidemia    • Hypertension    • Morbid obesity ([place] [address] ) 04/17/2018   • Postoperative visit 03/02/2022   • SIRS (systemic inflammatory response syndrome) ([place] [address] ) 03/12/2022   • Snoring    • Stage 3a chronic kidney disease ([place] [address] ) 02/19/2022     Patient Active Problem List   Diagnosis   • Type 2 diabetes mellitus without complication, with long-term current use of insulin ([place] [address] )   • Benign essential hypertension   • Mixed hyperlipidemia   • Erectile dysfunction   • Low testosterone   • Obesity (BMI 30-39  9)   • Testicular hypogonadism   • Incarcerated umbilical hernia   • Left ureteral calculus   • Type 2 diabetes mellitus with hyperlipidemia (HCC)   • Colonic mass   • Microcytic anemia   • Hypokalemia   • Transaminitis   • Thrombocytosis   • Iron deficiency anemia, unspecified   • Malignant neoplasm of transverse colon (HCC)   • 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)   • Colon cancer metastasized to liver [facility])   • Colostomy prolapse (HCC)   • Other fatigue   • Cervical radiculopathy   • Encephalopathy   • Flash pulmonary edema (HCC)   • MR (mitral regurgitation)   • Bacteremia   • ESBL (extended spectrum beta-lactamase) producing bacteria infection   • Acute respiratory failure with hypoxia (HCC)       /83   Pulse 105   Temp (!) 97 3 °F (36 3 °C)   Resp 16   Ht 5' 7 99" (1 727 m) Wt 103 kg (226 lb 3 1 oz)   SpO2 96%   BMI 34 40 kg/m²     Medications:    Inpatient Medications:     Scheduled Medications:  Current Facility-Administered Medications   Medication Dose Route Frequency Provider Last Rate   • acetaminophen  975 mg Oral Q8H Sy Leiva MD     • amLODIPine  5 mg Oral BID Arnel Perez MD     • aspirin  81 mg Oral Daily Arnel Perez MD     • atorvastatin  40 mg Oral Daily Arnel Perez MD     • calcium carbonate  500 mg Oral BID PRN Mayito Blackwell MD     • cholecalciferol  400 Units Oral Daily Arnel Perez MD     • enoxaparin  40 mg Subcutaneous BID Arnel Perez MD     • HYDROmorphone  0 5 mg Intravenous Q3H PRN Mayito Blackwell MD     • insulin glargine  15 Units Subcutaneous HS Arnel Perez MD     • insulin lispro  2-12 Units Subcutaneous TID AC Miguelina Valencia MD     • insulin lispro  4 Units Subcutaneous TID With Meals Damari Knight MD     • lactated ringers  125 mL/hr Intravenous Continuous Miguelina Valencia  mL/hr (12/08/22 1638)   • lisinopril  40 mg Oral Daily Arnel Perez MD     • methocarbamol  500 mg Oral BID Arnel Perez MD     • metoprolol  5 mg Intravenous Q8H PRN Maggy Villatoro PA-C     • multivitamin-minerals  1 tablet Oral Daily Arnel Perez MD     • ondansetron  4 mg Oral Q6H PRN Arnel Perez MD     • oxyCODONE  10 mg Oral Q6H PRN Arnel Perez MD     • oxyCODONE  5 mg Oral Q6H PRN Arnel Perez MD     • pantoprazole  40 mg Oral BID AC Arnel Perez MD     • piperacillin-tazobactam  4 5 g Intravenous Q6H Miguelina Valencia MD 4 5 g (12/08/22 1638)   • tamsulosin  0 4 mg Oral Daily With Dinner Miguelina Valencia MD         Infusions:  lactated ringers, 125 mL/hr, Last Rate: 125 mL/hr (12/08/22 1638)        PRN:  •  calcium carbonate  •  HYDROmorphone  •  metoprolol  •  ondansetron  •  oxyCODONE  •  oxyCODONE    Outpatient Medications:  No current facility-administered medications on file prior to encounter  Current Outpatient Medications on File Prior to Encounter   Medication Sig Dispense Refill   • amLODIPine (NORVASC) 5 mg tablet TAKE 1 TABLET BY MOUTH TWICE A  tablet 0   • aspirin 81 MG tablet Take 81 mg by mouth daily  • atorvastatin (LIPITOR) 40 mg tablet TAKE 1 TABLET BY MOUTH EVERY DAY 90 tablet 3   • Cholecalciferol (VITAMIN D3 PO) Take 400 Units by mouth daily     • Continuous Blood Gluc Sensor (FreeStyle Parrish 14 Day Sensor) MISC Use 1 each every 14 (fourteen) days 2 each 6   • enoxaparin (LOVENOX) 40 mg/0 4 mL Inject 0 4 mL (40 mg total) under the skin every 24 hours for 5 days Do not start before December 4, 2022  2 mL 0   • lisinopril (ZESTRIL) 40 mg tablet TAKE 1 TABLET BY MOUTH EVERY DAY 90 tablet 3   • methocarbamol (ROBAXIN) 500 mg tablet Take 1 tablet (500 mg total) by mouth 2 (two) times a day 20 tablet 0   • metoclopramide (REGLAN) 10 mg tablet TAKE 1 TABLET BY MOUTH TWICE A  tablet 0   • Multiple Vitamins-Minerals (multivitamin with minerals) tablet Take 1 tablet by mouth daily     • pantoprazole (PROTONIX) 40 mg tablet TAKE 1 TABLET BY MOUTH TWICE A  tablet 3   • DULoxetine (Cymbalta) 30 mg delayed release capsule Take 1 capsule (30 mg total) by mouth daily for 7 days, THEN 2 capsules (60 mg total) daily for 21 days   49 capsule 0   • glyBURIDE-metFORMIN (GLUCOVANCE) 5-500 MG per tablet Take 1 tablet by mouth daily with breakfast Taking 1 tablet in the morning      • Insulin Pen Needle (B-D UF III MINI PEN NEEDLES) 31G X 5 MM MISC Inject under the skin daily 100 each 3   • Januvia 100 MG tablet TAKE 1 TABLET BY MOUTH EVERY DAY 90 tablet 3   • Lantus SoloStar 100 units/mL SOPN INJECT 30 UNITS UNDER THE SKIN DAILY 45 mL 2   • Needle, Disp, (HYPODERMIC NEEDLE) 23G X 1-1/2" MISC by Does not apply route once a week 10 each 6   • ondansetron (ZOFRAN) 4 mg tablet Take 4 mg by mouth every 6 (six) hours as needed • Ostomy Supplies MISC Use in the morning 100 each 3       Allergies   Allergen Reactions   • Shellfish-Derived Products - Food Allergy Anaphylaxis   • Erythromycin GI Intolerance       Anticoagulants: ASA and LMW heparin    ASA classification: ASA 3 - Patient with moderate systemic disease with functional limitations    Airway Assessment: III (soft and hard palate and base of uvula visible)    Relevant family history: None    Relevant review of systems: None    Prior sedation/anesthesia: yes    Can the patient lie flat? Yes     NPO Status: yes    Labs:   CBC with diff:   Lab Results   Component Value Date    WBC 28 29 (H) 12/08/2022    HGB 9 2 (L) 12/08/2022    HCT 29 2 (L) 12/08/2022    MCV 88 12/08/2022     (H) 12/08/2022    MCH 27 6 12/08/2022    MCHC 31 5 12/08/2022    RDW 16 2 (H) 12/08/2022    MPV 10 7 12/08/2022    NRBC 0 12/08/2022     BMP/CMP:  Lab Results   Component Value Date     05/02/2017    K 4 1 12/06/2022    K 4 1 09/26/2022     (H) 12/06/2022     09/26/2022    CO2 18 (L) 12/06/2022    CO2 27 11/16/2022    CO2 21 09/26/2022    BUN 18 12/06/2022    BUN 21 09/26/2022    CREATININE 1 25 12/06/2022    CREATININE 1 18 05/02/2017    GLUCOSE 151 (H) 11/16/2022    GLUCOSE 118 (H) 05/02/2017    CALCIUM 8 5 12/06/2022    CALCIUM 9 5 05/02/2017    AST 67 (H) 11/25/2022    AST 22 09/26/2022    ALT 19 11/25/2022    ALT 21 09/26/2022    ALKPHOS 436 (H) 11/25/2022    ALKPHOS 94 04/10/2017    PROT 6 2 04/10/2017    BILITOT 0 6 04/10/2017    EGFR 63 12/06/2022   ,     Coags:   Lab Results   Component Value Date    PTT 37 11/09/2022    INR 1 10 11/12/2022   ,          Relevant imaging studies:   Reviewed  Directed physical examination:  I agree with the physical exam performed on 12/7/22 and there are no additional changes  Assessment/Plan:   Hepatectomy bed abscess drainage and radha tube placement  Sedation/Anesthesia plan:   Moderate sedation will be used as needed for procedure  Consent with alternatives to the procedure, risks and benefits have been explained and discussed with the patient/patient's family: yes

## 2022-12-08 NOTE — DISCHARGE INSTRUCTIONS
TUBE CARE INSTRUCTIONS    Care after your procedure:    Resume your normal diet  Small sips of flat soda will help with nausea  1  The properly functioning catheter should be forward flushed once (1x) daily with 10ml of normal saline using clean technique  You will be given a prescription for flushes  To flush the tube, clean both connections with alcohol swab  Twist off the drainage bag/ bulb  tubing and twist the saline syringe into the drainage tube and flush  Remove the syringe and twist the drainage bag / bulb tubing tubing back on     2  The drainage bag/bulb may be emptied as necessary  Keep a record of the amount of fluid you drain from your tube  This should be done with clean technique as well  3  A fresh dressing should be applied daily over the tube insertion site  4  As the tube is secured to the skin with only a suture,try not to pull on your tube  Tub baths are not permitted  Showers are permitted if the patient's skin entry site is prevented from getting wet  Similarly, washcloth "baths" are acceptable  Contact Interventional Radiology at [phone] Tabitha PATIENTS: Contact Interventional Radiology at [phone]) Shai Sensor PATIENTS: Contact Interventional Radiology at [phone]) if:    1  Leakage or large amounts of liquid around the catheter  2  Fever of 101 degrees lasting several hours without other obvious cause (such as sore throat, flu, etc)  3  Persistent nausea or vomiting  4  Diminished drainage, which may be associated with pressure or pain  Or when the     drainage from your tube is less than 10mls for 48 hours  5  Catheter pulled back or falls out  The following pharmacies carry the flush syringes         [address] [facility] [address] [facility]  Phone [phone]            Phone [phone] [address] [facility]                                [phone]  [facility]6 [facility] Genie DEJESUS                      [address] [facility]  Phone [phone]            Phone [phone]                      [facility]                                                                                                          [phone]  [facility]  [facility] 46    [address]  Phone [phone]

## 2022-12-08 NOTE — UTILIZATION REVIEW
Continued Stay Review    Date:12/8/2022                         Current Patient Class: inpatient Current Level of Care: med/surg    HPI:57 y o  male initially admitted on 12/4 obs to inpatient 12/7 for continued wound and pain management  Assessment/Plan: Pt has had increased drainage from his midline incision specifically upper portion of midline incision since yesterday  Pt c/o nausea relieved with Zofran  Pt ate more yesterday  Ambulated more yesterday also  Ileostomy is functioning well  Remains tachycardic  CXR revealed mild to moderate left pleural effusion, left lung base parenchymal process not excluded  Satting 92% on RA  550 ileostomy  Increase Lopressor 5 mg IV to every 8 hrs prn for tachycardia and HTN  CT a/p to r/o collection, possible reason for tachycardia  Continue to be oob and ambulated and working with PT  Continue to monitor midline wound  Continue diabetic diet  Continue Lantus insulin  Encourage incentive spirometry  Lovenox, SCDs for DVT ppx  Tentative d/c to acute rehab pending ins auth       Vital Signs:   Date/Time Temp Pulse Resp BP MAP (mmHg) SpO2 Calculated FIO2 (%) - Nasal Cannula Nasal Cannula O2 Flow Rate (L/min) O2 Device Patient Position - Orthostatic VS   12/08/22 12:04:30 97 3 °F (36 3 °C) Abnormal  101 17 165/89 114 96 % -- -- None (Room air) --   12/08/22 07:24:55 97 2 °F (36 2 °C) Abnormal  115 Abnormal  20 164/88 113 -- -- -- -- --   12/07/22 2200 -- 115 Abnormal  -- -- -- -- -- -- -- --   12/07/22 21:52:14 97 8 °F (36 6 °C) 129 Abnormal  20 144/88 107 95 % -- -- -- Lying   12/07/22 2130 -- -- -- -- -- 92 % -- -- None (Room air) --   12/07/22 1700 -- -- -- 155/95 -- -- -- -- -- --   12/07/22 1353 -- -- -- -- -- 94 % -- -- None (Room air) --   12/07/22 0900 -- -- -- -- -- -- -- -- Nasal cannula --   12/07/22 07:39:52 97 °F (36 1 °C) Abnormal  111 Abnormal  16 159/95 116 93 % -- -- -- --   12/07/22 0049 -- -- -- -- -- -- 28 2 L/min -- --   12/06/22 21:54:50 97 2 °F (36 2 °C) Abnormal  124 Abnormal  -- 161/97 118 90 % -- -- -- --   12/06/22 2000 -- 113 Abnormal  -- -- -- 92 % -- -- -- --   12/06/22 15:19:43 97 7 °F (36 5 °C) 117 Abnormal  16 140/85 103 92 % -- -- -- --   12/06/22 07:46:45 97 3 °F (36 3 °C) Abnormal  104 17 160/86 111 91 % -- -- -- --       Pertinent Labs/Diagnostic Results:   Results from last 7 days   Lab Units 12/06/22  1131   SARS-COV-2  Negative     Results from last 7 days   Lab Units 12/08/22  0440 12/06/22  0037 12/05/22  0925 12/04/22  0523   WBC Thousand/uL 28 29* 10 53* 12 98* 17 51*   HEMOGLOBIN g/dL 9 2* 10 5* 8 3* 8 3*   HEMATOCRIT % 29 2* 33 6* 27 7* 26 8*   PLATELETS Thousands/uL 416* 361 415* 428*   NEUTROS ABS Thousands/µL 24 17*  --  10 22* 14 34*     Results from last 7 days   Lab Units 12/06/22  1018 12/06/22  0037 12/05/22  0925 12/04/22  0542   SODIUM mmol/L 135 136 139 138   POTASSIUM mmol/L 4 1 3 8 3 8 4 5   CHLORIDE mmol/L 110* 110* 112* 112*   CO2 mmol/L 18* 20* 20* 19*   ANION GAP mmol/L 7 6 7 7   BUN mg/dL 18 18 19 17   CREATININE mg/dL 1 25 1 33* 1 23 1 16   EGFR ml/min/1 73sq m 63 58 64 69   CALCIUM mg/dL 8 5 8 2* 8 5 8 1*   PHOSPHORUS mg/dL 3 5  --   --   --      Results from last 7 days   Lab Units 12/08/22  1207 12/08/22  0721 12/07/22  2045 12/07/22  1611 12/07/22  1127 12/07/22  0737 12/06/22  2045 12/06/22  1606 12/06/22  1120 12/06/22  0821 12/05/22  2102 12/05/22  1659   POC GLUCOSE mg/dl 146* 135 72 169* 172* 145* 125 130 172* 201* 191* 203*     Results from last 7 days   Lab Units 12/06/22  1018 12/06/22  0037 12/05/22  0925 12/04/22  0542   GLUCOSE RANDOM mg/dL 207* 195* 180* 171*           Results from last 7 days   Lab Units 12/06/22  1131   INFLUENZA A PCR  Negative   INFLUENZA B PCR  Negative   RSV PCR  Negative       Medications:   Scheduled Medications:  acetaminophen, 975 mg, Oral, Q8H SHANNON  amLODIPine, 5 mg, Oral, BID  aspirin, 81 mg, Oral, Daily  atorvastatin, 40 mg, Oral, Daily  cholecalciferol, 400 Units, Oral, Daily  enoxaparin, 40 mg, Subcutaneous, BID  insulin glargine, 15 Units, Subcutaneous, HS  insulin lispro, 2-12 Units, Subcutaneous, TID AC  insulin lispro, 4 Units, Subcutaneous, TID With Meals  lisinopril, 40 mg, Oral, Daily  methocarbamol, 500 mg, Oral, BID  multivitamin-minerals, 1 tablet, Oral, Daily  pantoprazole, 40 mg, Oral, BID AC  tamsulosin, 0 4 mg, Oral, Daily With Dinner    Continuous IV Infusions:  lactated ringers, 125 mL/hr, Intravenous, Continuous    PRN Meds:  calcium carbonate, 500 mg, Oral, BID PRN  HYDROmorphone, 0 5 mg, Intravenous, Q3H PRN  metoprolol, 5 mg, Intravenous, Q8H PRN  ondansetron, 4 mg, Oral, Q6H PRN  oxyCODONE, 10 mg, Oral, Q6H PRN  oxyCODONE, 5 mg, Oral, Q6H PRN        Discharge Plan: D    Network Utilization Review Department  ATTENTION: Please call with any questions or concerns to [phone] and carefully listen to the prompts so that you are directed to the right person  All voicemails are confidential   Dylan Tran all requests for admission clinical reviews, approved or denied determinations and any other requests to dedicated fax number below belonging to the campus where the patient is receiving treatment   List of dedicated fax numbers for the Facilities:  [address] DENIALS (Administrative/Medical Necessity) [phone]   [address] [place] [address] (Maternity/NICU/Pediatrics) [phone]   7 Ericka Pinto [phone]   [facility] [address] [phone]   [address] [place] [address] [facility] Xiang [facility] 28 [phone]   [address] Janee Pete [facility] 134 1102 [facility] [phone]

## 2022-12-08 NOTE — QUICK NOTE
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

## 2022-12-08 NOTE — PROGRESS NOTES
No further drainage noted on the abdominal dressing placed by Dr Krishna Mancilla  Pt resting comfortably without complaints  Heart rate 105

## 2022-12-08 NOTE — PROGRESS NOTES
Progress Note - Surgical Oncology  Demetris Li 61 y o  male MRN: [id number]  Unit/Bed#: [facility] 906-01 Encounter: [id number]      Objective: Patient has had increased drainage from his midline incision specifically upper portion of midline incision since yesterday  Patient complains of nausea relieved with Zofran  Patient ate more yesterday  Ambulated more yesterday also  Ileostomy is functioning well  Still is tachycardic however throughout the evening and night highest documented rate was 129/min this morning it is 114/min  No fevers, no chills  Patient denies any shortness of breath  Chest x-ray yesterday revealed mild to moderate left pleural effusion, left lung base parenchymal process not excluded  Patient is satting at 92% on room air  Patient is urinating well on his own  975 UA  550 ileostomy    Blood pressure 144/88, pulse (!) 115, temperature 97 8 °F (36 6 °C), temperature source Temporal, resp  rate 20, height 5' 7 99" (1 727 m), weight 103 kg (226 lb 3 1 oz), SpO2 95 %  ,Body mass index is 34 4 kg/m²  Intake/Output Summary (Last 24 hours) at 12/8/2022 0513  Last data filed at 12/8/2022 0401  Gross per 24 hour   Intake 240 ml   Output 1525 ml   Net -1285 ml       Invasive Devices     Central Venous Catheter Line  Duration           Port A Cath 03/10/22 Right Chest 272 days          Peripheral Intravenous Line  Duration           Peripheral IV 12/06/22 Left;Ventral (anterior) Wrist 2 days          Drain  Duration           Ileostomy LUQ 20 days                Physical Exam:   Awake, alert, orientated x3  Abdomen: Soft, does not appear distended, midline wound has yellow fluid draining from the upper portion of the midline incision, ileostomy has air as well as soft stool, rate horizontal incision is clean with small amount of packing    Remaining staples intact  Extremities: No upper or lower extremity edema, no calf tenderness bilaterally    Lab, Imaging and other studies: CBC pending this morning  VTE Pharmacologic Prophylaxis: Enoxaparin (Lovenox)  VTE Mechanical Prophylaxis: sequential compression device    Assessment:  Status post exploratory laparotomy with segment 3 liver resection, transverse colectomy, left colectomy, ileocolonic anastomosis, diverting loop ileostomy  History of diabetes type 2    Plan:  1  Increase Lopressor 5 mg IV to every 8 hours as needed for tachycardia and hypertension  2  ?  CT scan abdomen to r/o collection; possible reason for tachycardia  3  Continue to be out of bed and ambulated and working with physical therapy  4  We will check midline wound at bedside with team  5  Continue diabetic diet  6  Do not hold his nighttime dose of Lantus  7  Continue to use the incentive spirometry hourly  8  Continue Lovenox for DVT prophylaxis  9  Continue case management for DC planning  7

## 2022-12-08 NOTE — PROGRESS NOTES
Patient drank all PO contrast  White sx resident Melvin Pastures aware  CT chest/abd/pelvis scheduled for 1100  CVC

## 2022-12-08 NOTE — RESTORATIVE TECHNICIAN NOTE
Restorative Technician Note      Patient Name: Aliza Mei     Restorative Tech Visit Date: 12/08/22  Note Type: Mobility  Patient Position Upon Consult: Supine (pt in supine on transport stretcher)  Activity Performed: Transferred; Other (Comment) (slide board)  Patient Position at End of Consult: Supine;  All needs within [facility]

## 2022-12-08 NOTE — PROGRESS NOTES
Spiritual Care Progress Note    2022  Patient: Gene Lynn : 1965  Admission Date & Time: 2022 0010  MRN: [id number] [facility]: [id number]      [facility] visited with pt and wife, Paul Campoverde, while doing rounds on the unit  Pt was resting in bed and [place] was sitting bedside, they were both awaiting the results of a test the pt had this morning   and pt were able to discuss treatments received, all his diagnoses and prognoses, as well as about life and the pt's hopes and dreams for the future  Pt was able to express his fears and concerns (financial and health) about his family and any future or potential surgeries that may be on his treatment plan  Pt was also able to express his concerns about the possibility of having to be put under anesthesia again  Pt expressed sadness about what he has had to endure and how what he has encountered during his stay in the hospital has affected his gunnar (unshaken) and strength and courage   provided an empathetic ear and a silent and supportive presence for the pt and Vika Grubbs was able to advocate for the pt with his care team while being able to provide some explanation about a specific concern the pt raised   provide words of prayer for strength, courage, and peace for the pt and his family   remains available                 Chaplaincy Interventions Utilized:   Empowerment: Clarified, confirmed, or reviewed information from treatment team , Encouraged assertiveness, Normalized experience of patient/family, Provided anxiety containment, and Reframed experience of patient/family    Exploration: Explored alternatives, Explored hope, Explored emotional needs & resources, Explored relational needs & resources, Explored spiritual needs & resources, and Facilitated story telling    Collaboration: Advocated for patient/family, Consulted with interdisciplinary team, and Facilitated respect for spiritual/cultural practice during hospitalization    Relationship Building: Cultivated a relationship of care and support, Listened empathically, and Provided silent and supportive presence    Ritual: Celebrated with patient/family and Provided prayer    Chaplaincy Outcomes Achieved:  Emotional resources utilized, Expressed gratitude, Expressed intermediate hope, Identified meaningful connections, Progressed toward understanding, Relational resources utilized, Spiritual resources utilized, and Tearfully processed emotions    Spiritual Coping Strategies Utilized:   Connectedness, Spiritual comfort, Spiritual gratitude, and Surrender       12/08/22 1500   Clinical Encounter Type   Visited With Patient;Patient and family together   Routine Visit Follow-up   Continue Visiting Yes   Referral From    Referral To    Lutheran Encounters   Lutheran Needs Prayer   Patient Spiritual Encounters   Spiritual Assessment 4   Suffering Severity 2   Fear Level 3   Feelings of Loneliness No   Feelings of Hopelessness Yes and no  (Pt expresses concerns about family but not necessarily hopeless)   Coping 3   Social Interaction 100% of the time   Family Spiritual Encounters   Family Coping Anger; Sadness   Family Normalization 3   Family Participation in Care 5   Family Support During Treatment 5   Caregiver-Patient Relationship 5

## 2022-12-08 NOTE — CASE MANAGEMENT
Case Management Discharge Planning Note    Patient name Joanene Record  [facility] [id number]  : 1965 Date 2022       Current Admission Date: 2022  Current Admission Diagnosis:Malignant neoplasm of transverse colon [facility])   Patient Active Problem List    Diagnosis Date Noted   • Flash pulmonary edema ([facility] [address] [facility] ) 2022   • MR (mitral regurgitation) 2022   • Bacteremia 2022   • ESBL (extended spectrum beta-lactamase) producing bacteria infection 2022   • Acute respiratory failure with hypoxia ([facility] [address] ) 2022   • Encephalopathy 2022   • Cervical radiculopathy 10/26/2022   • Other fatigue 06/15/2022   • Colostomy prolapse ([facility] ) 2022   • Colon cancer metastasized to liver ([facility] ) 2022   • 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 liver [facility] ) 2022   • Iron deficiency anemia, unspecified 2022   • Malignant neoplasm of transverse colon [facility] ) 2022   • Thrombocytosis 2022   • Hypokalemia 2022   • Transaminitis 2022   • Type 2 diabetes mellitus with hyperlipidemia ([facility] ) 2022   • Colonic mass 2022   • Microcytic anemia 2022   • Left ureteral calculus 2020   • Incarcerated umbilical hernia    • Testicular hypogonadism 2017   • Low testosterone 2017   • Type 2 diabetes mellitus without complication, with long-term current use of insulin ([facility] ) 2016   • Benign essential hypertension 2016   • Mixed hyperlipidemia 2016   • Erectile dysfunction 2016   • Obesity (BMI 30-39 9) 2016      LOS (days): 1  Geometric Mean LOS (GMLOS) (days):   Days to GMLOS:     OBJECTIVE:  Risk of Unplanned Readmission Score: 26 69         Current admission status: Inpatient   Preferred Pharmacy:   [id number] [place] [id number]  Phone: [phone] Fax: [address]  [address]  [facility] 3  [address] [place] [address]  Phone: [phone] Fax: [phone]    [id number] [place] - [address] [facility] [place] [address]  Phone: [phone] Fax: [phone]    Primary Care Provider: Rocio Sepulveda MD    Primary Insurance: [employer]  Secondary Insurance:     DISCHARGE DETAILS:         Other Referral/Resources/Interventions Provided:  Referral Comments: JIMENA spoke with Jenna from [facility] not yet obtained  Also notified patient is not medically cleared for d/c at this time  Additional Comments: JIMENA spoke with provider this morning  Patient is not medically cleared for discharge today  CT chest/abdomen/pelvis pending for increased drainage at his midline incision  Update: JIMENA spoke with spouse to update on case  All questions answered at this time

## 2022-12-08 NOTE — WOUND OSTOMY CARE
Progress Note- Ostomy  Lillie Kaur 61 y o  male  [id number]  [facility] 906-[facility] 906-01        Assessment:  Patient is admitted due to malignant neoplasm of transverse colon  Patient recently underwent surgical procedure for loop ileostomy and partial colectomy after failed reversal of loop colostomy  Seen for ostomy education and teaching  Introduced self and role to patient  Patient is agreeable to visit  Patient is primary learner and requested ostomy nurses to come help with pouch changes as a refresher and for other questions and concerns  All questions and concerns answered at this time  Patient is alert and oriented x4, fatigued and was beginning to fall asleep during pouch change; therefore patient was assisted by ostomy nurses during pouch change  No education provided today due to patient fatigue  Step-by-step pouch change done using 1 piece high output ileostomy pouch  Reviewed with patient how and when to measure the stoma (weekly)  Demonstrated how to mold two piece barrier/ trace & cut one piece barrier, and how to apply it to the skin using gentle pressure / warmth of the hands  Skin prep applied to james-stomal skin, rationale explained to patient  Good seal obtained  LUQ loop ileostomy: Stoma measures 4 5 cm, is round in shape, slightly budded, os is noted to be the distal limb, mucocutaneous junction slightly  from 8-10 o'clock with yellow and pink tissue visible  James-stomal skin is intact with no redness or wounds  Effluent is moderate amount of liquid brown stool   -There is a wound noted proximal to stoma toward 1 o'clock that is oval in shape, full-thickness, approx  20% yellow tissue and 80% pink tissue with small amount of tan and serosanguineous drainage- Maxorb Alginate place over wound bed prior to applying pouch  Ostomy Care:  1  Peel back pouch using push-pull method, may use non-alcohol adhesive remover    2  Use warm water only to cleanse skin around the stoma (james-stomal skin)  3  Make sure all adhesive residue is removed and skin is dry and not oily  4  Measure stoma size using measuring guide and trace correct measurements onto back of pouch (Off set opening away from abdominal incision)  5  Then cut backing of pouch out to correct shape/size  6  Place piece of Maxorb Alginate over wound on abdomen proximal to stoma prior to applying pouch  7  Place pouch over stoma and onto skin  8  Use warmth of hand to apply gentle pressure for about 3-6 minutes to help backing of pouch to adhere well to skin  Ileostomy LUQ (Active)   Stomal Appliance 1 piece; Changed 12/08/22 0920   Stoma Assessment Budded;Ware Place 12/08/22 0920   Stoma Shape Round 12/08/22 0920   Peristomal Assessment Clean; Intact 12/08/22 0920   Treatment Bag change;Site care 12/08/22 0920   Output (mL) 200 mL 12/08/22 0920   Number of days: 21       Call or tigertext with any questions  Wound and Ostomy Care will continue to follow    Dipika Rae BSN RN CWON  Wound and Ostomy care

## 2022-12-08 NOTE — CONSULTS
Consultation - Infectious Disease   Gene Lynn 61 y o  male MRN: [id number]  Unit/Bed#: [facility] 906-01 Encounter: [id number]      IMPRESSION & RECOMMENDATIONS:   1  Sepsis- evolving since admission  Leukocytosis and tachycardia  Appears to be secondary to recurrent intra-abdominal abscesses  Consideration for the possibility of bacteremia once again  Fortunately the patient remained hemodynamically stable despite his systemic illness  -Continue Zosyn for now with current dose  -Follow-up blood cultures  -Source control measures as below  -Recheck CBC with differential and CMP  -Supportive care    2  Intra-abdominal abscesses-in the setting of a recent complex surgical history including resection of segments of the liver, transverse colon resection, intra-abdominal abscesses requiring drainage and ileostomy  Patient now with possible leak into the region of the previous liver resections and 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 with succus drainage of the wound  -Antibiotics as above  -Consult interventional radiology for drainage of the collections  -Follow-up cultures and adjust antibiotics needed  -Drain management  -Close surgical follow-up    3  Possible acute cholecystitis-although not much in the way of abdominal tenderness in the right upper quadrant   -Check right upper quadrant ultrasound  -Possible percutaneous cholecystotomy drainage    4  Recent anastomotic leak with intra-abdominal abscess/infected hematoma- status post exploratory laparotomy with drainage of the abscess and cecal resection with wound culture growing ESBL E  Coli  -Antibiotics as above  -Source control measures as above    5  Recent ESBL E  coli bacteremia-as a complication of the recent anastomotic leak with intra-abdominal abscess formation  Patient received a course of ertapenem for 7 days postoperatively   -Antibiotics as above for now  -Follow-up blood cultures    6    Diabetes mellitus-type II    Have discussed the above management plan in detail with the primary service    Extensive review of the medical records in epic including review of the notes, radiographs, and laboratory results     HISTORY OF PRESENT ILLNESS:  Reason for Consult: Intra-abdominal abscesses  HPI: Catracho Ledesma is a 61y o  year old male with diabetes mellitus, obesity, chronic kidney disease, gastroparesis, and recently diagnosed colon cancer with metastatic disease to the liver status post exploratory laparotomy, transverse colon resection, resection of the liver x3, ablation, peritoneal biopsy, reversal of colostomy, and takedown of the splenic flexure we are asked to assist with management of intra-abdominal abscesses after recently having a prolonged hospital stay with sepsis secondary to an ESBL E  coli bacteremia  During the patient's last hospital stay he was found to have an anastomotic leak and underwent exploratory laparotomy with drainage of abscess and cecal resection  He was treated with a course of ertapenem x7 days postop  He was monitored off all antibiotics and eventually discharged to rehab on 12/3/2022  He was readmitted the following day when the skilled nursing facility did not believe they could manage the patient  He was found to have a leukocytosis that was relatively mild and which improved and remained stable off all antibiotics  Of note the patient has been off all antibiotics since 11/24/2022  During the patient's brief readmission he is remained afebrile and hemodynamically stable  However today the patient was noted to have a dramatic increase in his WBC count and on exam was found to have succus drainage from his abdominal wound  He underwent repeat CT of the abdomen pelvis that revealed intra-abdominal abscesses in the region of the liver resection and also possible evidence of acute cholecystitis  He has had blood cultures obtained and started on Zosyn    The patient denies having any cough or shortness of breath, denies any sore throat rhinorrhea or nasal congestion, denies any abdominal pain, denies any vomiting, denies any dysuria or hematuria  REVIEW OF SYSTEMS:  A complete review of systems is negative other than that noted in the HPI  PAST MEDICAL HISTORY:  Past Medical History:   Diagnosis Date   • Abdominal pain 03/12/2022   • Acute renal failure ([place] [address] )     [id number] resolved   • Cancer ([place] [address] )    • Diabetes mellitus ([place] [address] )    • Enteritis 08/23/2016   • Gastroparesis due to DM ([place] [address] ) 08/23/2016   • GERD (gastroesophageal reflux disease)    • Hernia, ventral 08/04/2016   • Hyperlipidemia    • Hypertension    • Morbid obesity ([place] [address] ) 04/17/2018   • Postoperative visit 03/02/2022   • SIRS (systemic inflammatory response syndrome) ([place] [address] ) 03/12/2022   • Snoring    • Stage 3a chronic kidney disease ([place] [address] ) 02/19/2022     Past Surgical History:   Procedure Laterality Date   • COLONOSCOPY     • COLOSTOMY N/A 02/20/2022    Procedure: COLOSTOMY LOOP, diverting;  Surgeon: Veronica Betancourt MD;  Location: [facility];  Service: General   • ESOPHAGOGASTRODUODENOSCOPY N/A 08/24/2016    Procedure: ESOPHAGOGASTRODUODENOSCOPY (EGD); Surgeon: Rock Simon MD;  Location: [facility];   Service:    • EXPLORATORY LAPAROTOMY W/ BOWEL RESECTION N/A 11/16/2022    Procedure: LAPAROTOMY EXPLORATORY W/ BOWEL RESECTION- VAC PLACEMENT;  Surgeon: Raegan Olmos MD;  Location: [facility];  Service: Surgical Oncology   • EXPLORATORY LAPAROTOMY W/ BOWEL RESECTION N/A 11/17/2022    Procedure: LAPAROTOMY EXPLORATORY W/ BOWEL RESECTION;  Surgeon: Raegan Olmos MD;  [facility]: [facility];  Service: Surgical Oncology   • ILEOSTOMY N/A 11/17/2022    Procedure: Isabela Paulino;  Surgeon: Raegan Olmos MD;  [facility];  Service: Surgical Oncology   • ILEOSTOMY CLOSURE N/A 11/7/2022    Procedure: REVERSAL COLOSTOMY;  Surgeon: Ankur Negron MD;  [facility];  Service: Surgical Oncology   • IR PORT PLACEMENT  03/10/2022   • KIDNEY STONE SURGERY     • LIVER BIOPSY LAPAROSCOPIC N/A 2022    Procedure: DIAGNOSTIC LAPAROSCOPIC LIVER BIOPSY, DIVERTING LOOP COLOSTOMY ;  Surgeon: Eladia Randle MD;  Location: [facility];  Service: General   • LIVER LOBECTOMY N/A 2022    Procedure: SEGMENT 3 LIVER RESECTION, ABLATION, INTRAOPERATIVE U/S OF LIVER, PERITONEAL BIOPSY;  Surgeon: Reji Menedz MD;  [facility]: [facility];  Service: Surgical Oncology   • RIGHT COLON RESECTION N/A 2022    Procedure: EX LAP, TRANVERSE COLON RESECTION;  Surgeon: Reji Mendez MD;  [facility];  Service: Surgical Oncology   • TONSILLECTOMY     • UMBILICAL HERNIA REPAIR LAPAROSCOPIC N/A 2019    Procedure: LAPAROSCOPIC UMBILICAL HERNIA REPAIR;  Surgeon: Eladia Randle MD;  [facility]: [facility];  Service: General   • VAC DRESSING APPLICATION N/A     Procedure: APPLICATION VAC DRESSING ABDOMEN/TRUNK;  Surgeon: Rainer Whitaker MD;  [facility]: [facility];  Service: Surgical Oncology       FAMILY HISTORY:  Non-contributory    SOCIAL HISTORY:  Social History   Social History     Substance and Sexual Activity   Alcohol Use Never     Social History     Substance and Sexual Activity   Drug Use No     Social History     Tobacco Use   Smoking Status Never   Smokeless Tobacco Never       ALLERGIES:  Allergies   Allergen Reactions   • Shellfish-Derived Products - Food Allergy Anaphylaxis   • Erythromycin GI Intolerance       MEDICATIONS:  All current active medications have been reviewed    Antibiotics: None    PHYSICAL EXAM:  Temp:  [97 2 °F (36 2 °C)-97 8 °F (36 6 °C)] 97 3 °F (36 3 °C)  HR:  [101-129] 107  Resp:  [17-20] 18  BP: (144-165)/(88-95) 158/90  SpO2:  [92 %-96 %] 92 %  Temp (24hrs), Av 4 °F (36 3 °C), Min:97 2 °F (36 2 °C), Max:97 8 °F (36 6 °C)  Current: Temperature: (!) 97 3 °F (36 3 °C)    Intake/Output Summary (Last 24 hours) at 2022 1639  Last data filed at 2022 1637  Gross per 24 hour   Intake 2140 42 ml   Output 2150 ml   Net -9 58 ml       General Appearance:  Appearing well, nontoxic, and in no distress   Head:  Normocephalic, without obvious abnormality, atraumatic   Eyes:  Conjunctiva pink and sclera anicteric, both eyes   Nose: Nares normal, mucosa normal, no drainage   Throat: Oropharynx moist without lesions   Neck: Supple, symmetrical, no adenopathy, no tenderness/mass/nodules   Back:   Symmetric, no curvature, ROM normal, no CVA tenderness   Lungs:   Decreased breath sounds bilaterally, respirations unlabored   Chest Wall:  No tenderness or deformity   Heart:  Tachycardic; no murmur, rub or gallop   Abdomen:   Soft, non-tender, non-distended, positive bowel sounds  Ostomy with output  Succus drainage onto the dressing from the wound   Extremities: No cyanosis, clubbing or edema   Skin: No rashes or lesions  No draining wounds noted  Lymph nodes: Cervical, supraclavicular nodes normal   Neurologic: Alert and oriented times 3, extremity strength 5/5 and symmetric       LABS, IMAGING, & OTHER STUDIES:  Lab Results:  I have personally reviewed pertinent labs  Results from last 7 days   Lab Units 12/08/22  0440 12/06/22  0037 12/05/22  0925   WBC Thousand/uL 28 29* 10 53* 12 98*   HEMOGLOBIN g/dL 9 2* 10 5* 8 3*   PLATELETS Thousands/uL 416* 361 415*     Results from last 7 days   Lab Units 12/06/22  1018 12/06/22  0037 12/05/22  0925   SODIUM mmol/L 135 136 139   POTASSIUM mmol/L 4 1 3 8 3 8   CHLORIDE mmol/L 110* 110* 112*   CO2 mmol/L 18* 20* 20*   BUN mg/dL 18 18 19   CREATININE mg/dL 1 25 1 33* 1 23   EGFR ml/min/1 73sq m 63 58 64   CALCIUM mg/dL 8 5 8 2* 8 5                           Imaging Studies:     CT abdomen pelvis- left-sided extraluminal pericolic gas associated with a small complex fluid collection near the colonic suture line suggesting a possible postoperative leak  Left abdominal wall rim-enhancing collection focal gas and fluid in close proximity suggesting a abscess    Gallbladder distention with associated thickening and edema and a new fluid collection contiguous with the gallbladder fossa  Mildly enlarged fluid collection adjacent to the 3 hepatectomy sites which have increased in size    Left greater than right effusions and atelectasis at the base of the lungs    Images personally reviewed by me in PACS

## 2022-12-08 NOTE — ARC ADMISSION
Notified by CM that patient is not medically stable for discharge today  Will continue to follow patient's case at this time, monitoring for medical stability and functional progress  Additionally, insurance authorization request for inpatient acute rehab remains pending at this time  Will update as able

## 2022-12-08 NOTE — PLAN OF CARE
Problem: PAIN - ADULT  Goal: Verbalizes/displays adequate comfort level or baseline comfort level  Description: Interventions:  - Encourage patient to monitor pain and request assistance  - Assess pain using appropriate pain scale  - Administer analgesics based on type and severity of pain and evaluate response  - Implement non-pharmacological measures as appropriate and evaluate response  - Consider cultural and social influences on pain and pain management  - Notify physician/advanced practitioner if interventions unsuccessful or patient reports new pain  Outcome: Progressing     Problem: INFECTION - ADULT  Goal: Absence or prevention of progression during hospitalization  Description: INTERVENTIONS:  - Assess and monitor for signs and symptoms of infection  - Monitor lab/diagnostic results  - Monitor all insertion sites, i e  indwelling lines, tubes, and drains  - Monitor endotracheal if appropriate and nasal secretions for changes in amount and color  - [place] appropriate cooling/warming therapies per order  - Administer medications as ordered  - Instruct and encourage patient and family to use good hand hygiene technique  - Identify and instruct in appropriate isolation precautions for identified infection/condition  Outcome: Progressing  Goal: Absence of fever/infection during neutropenic period  Description: INTERVENTIONS:  - Monitor WBC    Outcome: Progressing     Problem: SAFETY ADULT  Goal: Patient will remain free of falls  Description: INTERVENTIONS:  - Educate patient/family on patient safety including physical limitations  - Instruct patient to call for assistance with activity   - Consult OT/PT to assist with strengthening/mobility   - Keep Call bell within reach  - Keep bed low and locked with side rails adjusted as appropriate  - Keep care items and personal belongings within reach  - Initiate and maintain comfort rounds  - Make Fall Risk Sign visible to staff  - Offer Toileting every 3 Hours, in advance of need  - Initiate/Maintain 3alarm  - Obtain necessary fall risk management equipment: 3  - Apply yellow socks and bracelet for high fall risk patients  - Consider moving patient to room near nurses station  Outcome: Progressing  Goal: Maintain or return to baseline ADL function  Description: INTERVENTIONS:  -  Assess patient's ability to carry out ADLs; assess patient's baseline for ADL function and identify physical deficits which impact ability to perform ADLs (bathing, care of mouth/teeth, toileting, grooming, dressing, etc )  - Assess/evaluate cause of self-care deficits   - Assess range of motion  - Assess patient's mobility; develop plan if impaired  - Assess patient's need for assistive devices and provide as appropriate  - Encourage maximum independence but intervene and supervise when necessary  - Involve family in performance of ADLs  - Assess for home care needs following discharge   - Consider OT consult to assist with ADL evaluation and planning for discharge  - Provide patient education as appropriate  Outcome: Progressing  Goal: Maintains/Returns to pre admission functional level  Description: INTERVENTIONS:  - Perform BMAT or MOVE assessment daily    - Set and communicate daily mobility goal to care team and patient/family/caregiver  - Collaborate with rehabilitation services on mobility goals if consulted  - Perform Range of Motion 3 times a day  - Reposition patient every 3 hours    - Dangle patient 3 times a day  - Stand patient 3 times a day  - Ambulate patient 3 times a day  - Out of bed to chair 3 times a day   - Out of bed for meals 3 times a day  - Out of bed for toileting  - Record patient progress and toleration of activity level   Outcome: Progressing     Problem: DISCHARGE PLANNING  Goal: Discharge to home or other facility with appropriate resources  Description: INTERVENTIONS:  - Identify barriers to discharge w/patient and caregiver  - Arrange for needed discharge resources and transportation as appropriate  - Identify discharge learning needs (meds, wound care, etc )  - Arrange for interpretive services to assist at discharge as needed  - Refer to Case Management Department for coordinating discharge planning if the patient needs post-hospital services based on physician/advanced practitioner order or complex needs related to functional status, cognitive ability, or social support system  Outcome: Progressing     Problem: Knowledge Deficit  Goal: Patient/family/caregiver demonstrates understanding of disease process, treatment plan, medications, and discharge instructions  Description: Complete learning assessment and assess knowledge base    Interventions:  - Provide teaching at level of understanding  - Provide teaching via preferred learning methods  Outcome: Progressing     Problem: SKIN/TISSUE INTEGRITY - ADULT  Goal: Skin Integrity remains intact(Skin Breakdown Prevention)  Description: Assess:  -Perform Michael assessment every 3  -Clean and moisturize skin every 3  -Inspect skin when repositioning, toileting, and assisting with ADLS  -Assess under medical devices such as 3 every 3  -Assess extremities for adequate circulation and sensation     Bed Management:  -Have minimal linens on bed & keep smooth, unwrinkled  -Change linens as needed when moist or perspiring  -Avoid sitting or lying in one position for more than 3 hours while in bed  -Keep HOB at 3degrees     Toileting:  -Offer bedside commode  -Assess for incontinence every 3  -Use incontinent care products after each incontinent episode such as 3    Activity:  -Mobilize patient 3 times a day  -Encourage activity and walks on unit  -Encourage or provide ROM exercises   -Turn and reposition patient every 3 Hours  -Use appropriate equipment to lift or move patient in bed  -Instruct/ Assist with weight shifting every 3 when out of bed in chair  -Consider limitation of chair time 3 hour intervals    Skin Care:  -Avoid use of baby powder, tape, friction and shearing, hot water or constrictive clothing  -Relieve pressure over bony prominences using 3  -Do not massage red bony areas    Next Steps:  -Teach patient strategies to minimize risks such as 3   -Consider consults to  interdisciplinary teams such as 3  Outcome: Progressing  Goal: Incision(s), wounds(s) or drain site(s) healing without S/S of infection  Description: INTERVENTIONS  - Assess and document dressing, incision, wound bed, drain sites and surrounding tissue  - Provide patient and family education  - Perform skin care/dressing changes every 3  Outcome: Progressing  Goal: Pressure injury heals and does not worsen  Description: Interventions:  - Implement low air loss mattress or specialty surface (Criteria met)  - Apply silicone foam dressing  - Instruct/assist with weight shifting every 3 minutes when in chair   - Limit chair time to 3 hour intervals  - Use special pressure reducing interventions such as 3 when in chair   - Apply fecal or urinary incontinence containment device   - Perform passive or active ROM every 3  - Turn and reposition patient & offload bony prominences every 3 hours   - Utilize friction reducing device or surface for transfers   - Consider consults to  interdisciplinary teams such as 3  - Use incontinent care products after each incontinent episode such as 3  - Consider nutrition services referral as needed  Outcome: Progressing     Problem: Prexisting or High Potential for Compromised Skin Integrity  Goal: Skin integrity is maintained or improved  Description: INTERVENTIONS:  - Identify patients at risk for skin breakdown  - Assess and monitor skin integrity  - Assess and monitor nutrition and hydration status  - Monitor labs   - Assess for incontinence   - Turn and reposition patient  - Assist with mobility/ambulation  - Relieve pressure over bony prominences  - Avoid friction and shearing  - Provide appropriate hygiene as needed including keeping skin clean and dry  - Evaluate need for skin moisturizer/barrier cream  - Collaborate with interdisciplinary team   - Patient/family teaching  - Consider wound care consult   Outcome: Progressing     Problem: MOBILITY - ADULT  Goal: Maintain or return to baseline ADL function  Description: INTERVENTIONS:  -  Assess patient's ability to carry out ADLs; assess patient's baseline for ADL function and identify physical deficits which impact ability to perform ADLs (bathing, care of mouth/teeth, toileting, grooming, dressing, etc )  - Assess/evaluate cause of self-care deficits   - Assess range of motion  - Assess patient's mobility; develop plan if impaired  - Assess patient's need for assistive devices and provide as appropriate  - Encourage maximum independence but intervene and supervise when necessary  - Involve family in performance of ADLs  - Assess for home care needs following discharge   - Consider OT consult to assist with ADL evaluation and planning for discharge  - Provide patient education as appropriate  Outcome: Progressing  Goal: Maintains/Returns to pre admission functional level  Description: INTERVENTIONS:  - Perform BMAT or MOVE assessment daily    - Set and communicate daily mobility goal to care team and patient/family/caregiver  - Collaborate with rehabilitation services on mobility goals if consulted  - Perform Range of Motion 3 times a day  - Reposition patient every 3 hours    - Dangle patient 3 times a day  - Stand patient 3 times a day  - Ambulate patient 3 times a day  - Out of bed to chair 3 times a day   - Out of bed for meals 3 times a day  - Out of bed for toileting  - Record patient progress and toleration of activity level   Outcome: Progressing     Problem: Potential for Falls  Goal: Patient will remain free of falls  Description: INTERVENTIONS:  - Educate patient/family on patient safety including physical limitations  - Instruct patient to call for assistance with activity   - Consult OT/PT to assist with strengthening/mobility   - Keep Call bell within reach  - Keep bed low and locked with side rails adjusted as appropriate  - Keep care items and personal belongings within reach  - Initiate and maintain comfort rounds  - Make Fall Risk Sign visible to staff  - Offer Toileting every 3 Hours, in advance of need  - Initiate/Maintain 3alarm  - Obtain necessary fall risk management equipment: 3  - Apply yellow socks and bracelet for high fall risk patients  - Consider moving patient to room near nurses station  Outcome: Progressing

## 2022-12-09 LAB
ANION GAP SERPL CALCULATED.3IONS-SCNC: 7 MMOL/L (ref 4–13)
ANION GAP SERPL CALCULATED.3IONS-SCNC: 7 MMOL/L (ref 4–13)
ANISOCYTOSIS BLD QL SMEAR: PRESENT
BASOPHILS # BLD MANUAL: 0 THOUSAND/UL (ref 0–0.1)
BASOPHILS NFR MAR MANUAL: 0 % (ref 0–1)
BUN SERPL-MCNC: 22 MG/DL (ref 5–25)
BUN SERPL-MCNC: 25 MG/DL (ref 5–25)
CALCIUM SERPL-MCNC: 7.9 MG/DL (ref 8.3–10.1)
CALCIUM SERPL-MCNC: 8 MG/DL (ref 8.3–10.1)
CHLORIDE SERPL-SCNC: 106 MMOL/L (ref 96–108)
CHLORIDE SERPL-SCNC: 109 MMOL/L (ref 96–108)
CO2 SERPL-SCNC: 16 MMOL/L (ref 21–32)
CO2 SERPL-SCNC: 19 MMOL/L (ref 21–32)
CREAT SERPL-MCNC: 1.46 MG/DL (ref 0.6–1.3)
CREAT SERPL-MCNC: 1.71 MG/DL (ref 0.6–1.3)
EOSINOPHIL # BLD MANUAL: 0 THOUSAND/UL (ref 0–0.4)
EOSINOPHIL NFR BLD MANUAL: 0 % (ref 0–6)
ERYTHROCYTE [DISTWIDTH] IN BLOOD BY AUTOMATED COUNT: 16.7 % (ref 11.6–15.1)
GFR SERPL CREATININE-BSD FRML MDRD: 43 ML/MIN/1.73SQ M
GFR SERPL CREATININE-BSD FRML MDRD: 52 ML/MIN/1.73SQ M
GLUCOSE SERPL-MCNC: 136 MG/DL (ref 65–140)
GLUCOSE SERPL-MCNC: 137 MG/DL (ref 65–140)
GLUCOSE SERPL-MCNC: 172 MG/DL (ref 65–140)
GLUCOSE SERPL-MCNC: 181 MG/DL (ref 65–140)
GLUCOSE SERPL-MCNC: 194 MG/DL (ref 65–140)
GLUCOSE SERPL-MCNC: 215 MG/DL (ref 65–140)
HCT VFR BLD AUTO: 25.9 % (ref 36.5–49.3)
HGB BLD-MCNC: 8.2 G/DL (ref 12–17)
LYMPHOCYTES # BLD AUTO: 1.51 THOUSAND/UL (ref 0.6–4.47)
LYMPHOCYTES # BLD AUTO: 7 % (ref 14–44)
MAGNESIUM SERPL-MCNC: 1.8 MG/DL (ref 1.6–2.6)
MCH RBC QN AUTO: 27.6 PG (ref 26.8–34.3)
MCHC RBC AUTO-ENTMCNC: 31.7 G/DL (ref 31.4–37.4)
MCV RBC AUTO: 87 FL (ref 82–98)
MONOCYTES # BLD AUTO: 1.72 THOUSAND/UL (ref 0–1.22)
MONOCYTES NFR BLD: 8 % (ref 4–12)
NEUTROPHILS # BLD MANUAL: 18.07 THOUSAND/UL (ref 1.85–7.62)
NEUTS BAND NFR BLD MANUAL: 4 % (ref 0–8)
NEUTS SEG NFR BLD AUTO: 80 % (ref 43–75)
PHOSPHATE SERPL-MCNC: 4.6 MG/DL (ref 2.7–4.5)
PLATELET # BLD AUTO: 416 THOUSANDS/UL (ref 149–390)
PLATELET BLD QL SMEAR: ADEQUATE
PMV BLD AUTO: 10.6 FL (ref 8.9–12.7)
POIKILOCYTOSIS BLD QL SMEAR: PRESENT
POTASSIUM SERPL-SCNC: 4.4 MMOL/L (ref 3.5–5.3)
POTASSIUM SERPL-SCNC: 5 MMOL/L (ref 3.5–5.3)
RBC # BLD AUTO: 2.97 MILLION/UL (ref 3.88–5.62)
RBC MORPH BLD: PRESENT
SODIUM SERPL-SCNC: 132 MMOL/L (ref 135–147)
SODIUM SERPL-SCNC: 132 MMOL/L (ref 135–147)
VARIANT LYMPHS # BLD AUTO: 1 %
WBC # BLD AUTO: 21.51 THOUSAND/UL (ref 4.31–10.16)

## 2022-12-09 RX ORDER — SODIUM CHLORIDE 9 MG/ML
125 INJECTION, SOLUTION INTRAVENOUS CONTINUOUS
Status: DISCONTINUED | OUTPATIENT
Start: 2022-12-09 | End: 2022-12-09

## 2022-12-09 RX ORDER — MAGNESIUM SULFATE HEPTAHYDRATE 40 MG/ML
2 INJECTION, SOLUTION INTRAVENOUS ONCE
Status: COMPLETED | OUTPATIENT
Start: 2022-12-09 | End: 2022-12-09

## 2022-12-09 RX ORDER — HEPARIN SODIUM 5000 [USP'U]/ML
5000 INJECTION, SOLUTION INTRAVENOUS; SUBCUTANEOUS EVERY 8 HOURS SCHEDULED
Status: DISCONTINUED | OUTPATIENT
Start: 2022-12-09 | End: 2022-12-14 | Stop reason: HOSPADM

## 2022-12-09 RX ORDER — SODIUM CHLORIDE, SODIUM GLUCONATE, SODIUM ACETATE, POTASSIUM CHLORIDE, MAGNESIUM CHLORIDE, SODIUM PHOSPHATE, DIBASIC, AND POTASSIUM PHOSPHATE .53; .5; .37; .037; .03; .012; .00082 G/100ML; G/100ML; G/100ML; G/100ML; G/100ML; G/100ML; G/100ML
100 INJECTION, SOLUTION INTRAVENOUS CONTINUOUS
Status: DISCONTINUED | OUTPATIENT
Start: 2022-12-09 | End: 2022-12-09

## 2022-12-09 RX ORDER — SODIUM CHLORIDE 9 MG/ML
10 INJECTION INTRAVENOUS DAILY
Qty: 300 ML | Refills: 2 | Status: ON HOLD | OUTPATIENT
Start: 2022-12-09

## 2022-12-09 RX ADMIN — SODIUM CHLORIDE, SODIUM GLUCONATE, SODIUM ACETATE, POTASSIUM CHLORIDE AND MAGNESIUM CHLORIDE 100 ML/HR: 526; 502; 368; 37; 30 INJECTION, SOLUTION INTRAVENOUS at 18:23

## 2022-12-09 RX ADMIN — PIPERACILLIN AND TAZOBACTAM 4.5 G: 36; 4.5 INJECTION, POWDER, FOR SOLUTION INTRAVENOUS at 09:56

## 2022-12-09 RX ADMIN — INSULIN LISPRO 4 UNITS: 100 INJECTION, SOLUTION INTRAVENOUS; SUBCUTANEOUS at 10:10

## 2022-12-09 RX ADMIN — PANTOPRAZOLE SODIUM 40 MG: 40 TABLET, DELAYED RELEASE ORAL at 06:46

## 2022-12-09 RX ADMIN — AMLODIPINE BESYLATE 5 MG: 5 TABLET ORAL at 09:57

## 2022-12-09 RX ADMIN — INSULIN GLARGINE 15 UNITS: 100 INJECTION, SOLUTION SUBCUTANEOUS at 22:22

## 2022-12-09 RX ADMIN — ATORVASTATIN CALCIUM 40 MG: 40 TABLET, FILM COATED ORAL at 09:57

## 2022-12-09 RX ADMIN — SODIUM CHLORIDE, POTASSIUM CHLORIDE, SODIUM LACTATE AND CALCIUM CHLORIDE 125 ML/HR: 600; 310; 30; 20 INJECTION, SOLUTION INTRAVENOUS at 00:38

## 2022-12-09 RX ADMIN — INSULIN LISPRO 4 UNITS: 100 INJECTION, SOLUTION INTRAVENOUS; SUBCUTANEOUS at 13:40

## 2022-12-09 RX ADMIN — Medication 1 TABLET: at 09:57

## 2022-12-09 RX ADMIN — CHOLECALCIFEROL TAB 10 MCG (400 UNIT) 400 UNITS: 10 TAB at 09:57

## 2022-12-09 RX ADMIN — SODIUM BICARBONATE 100 ML/HR: 84 INJECTION, SOLUTION INTRAVENOUS at 20:56

## 2022-12-09 RX ADMIN — TAMSULOSIN HYDROCHLORIDE 0.4 MG: 0.4 CAPSULE ORAL at 17:09

## 2022-12-09 RX ADMIN — INSULIN LISPRO 2 UNITS: 100 INJECTION, SOLUTION INTRAVENOUS; SUBCUTANEOUS at 18:23

## 2022-12-09 RX ADMIN — HEPARIN SODIUM 5000 UNITS: 5000 INJECTION INTRAVENOUS; SUBCUTANEOUS at 09:57

## 2022-12-09 RX ADMIN — PIPERACILLIN AND TAZOBACTAM 4.5 G: 36; 4.5 INJECTION, POWDER, FOR SOLUTION INTRAVENOUS at 17:06

## 2022-12-09 RX ADMIN — METHOCARBAMOL TABLETS 500 MG: 500 TABLET, COATED ORAL at 09:57

## 2022-12-09 RX ADMIN — HEPARIN SODIUM 5000 UNITS: 5000 INJECTION INTRAVENOUS; SUBCUTANEOUS at 22:21

## 2022-12-09 RX ADMIN — PIPERACILLIN AND TAZOBACTAM 4.5 G: 36; 4.5 INJECTION, POWDER, FOR SOLUTION INTRAVENOUS at 23:53

## 2022-12-09 RX ADMIN — LISINOPRIL 40 MG: 20 TABLET ORAL at 09:56

## 2022-12-09 RX ADMIN — MAGNESIUM SULFATE HEPTAHYDRATE 2 G: 40 INJECTION, SOLUTION INTRAVENOUS at 11:20

## 2022-12-09 RX ADMIN — ACETAMINOPHEN 975 MG: 325 TABLET ORAL at 13:40

## 2022-12-09 RX ADMIN — INSULIN LISPRO 2 UNITS: 100 INJECTION, SOLUTION INTRAVENOUS; SUBCUTANEOUS at 13:40

## 2022-12-09 RX ADMIN — PIPERACILLIN AND TAZOBACTAM 4.5 G: 36; 4.5 INJECTION, POWDER, FOR SOLUTION INTRAVENOUS at 03:18

## 2022-12-09 RX ADMIN — ASPIRIN 81 MG CHEWABLE TABLET 81 MG: 81 TABLET CHEWABLE at 09:57

## 2022-12-09 RX ADMIN — METHOCARBAMOL TABLETS 500 MG: 500 TABLET, COATED ORAL at 17:08

## 2022-12-09 RX ADMIN — INSULIN LISPRO 4 UNITS: 100 INJECTION, SOLUTION INTRAVENOUS; SUBCUTANEOUS at 18:23

## 2022-12-09 RX ADMIN — AMLODIPINE BESYLATE 5 MG: 5 TABLET ORAL at 17:09

## 2022-12-09 RX ADMIN — SODIUM CHLORIDE 125 ML/HR: 0.9 INJECTION, SOLUTION INTRAVENOUS at 09:53

## 2022-12-09 RX ADMIN — PANTOPRAZOLE SODIUM 40 MG: 40 TABLET, DELAYED RELEASE ORAL at 17:09

## 2022-12-09 RX ADMIN — OXYCODONE HYDROCHLORIDE 10 MG: 10 TABLET ORAL at 22:59

## 2022-12-09 RX ADMIN — ACETAMINOPHEN 975 MG: 325 TABLET ORAL at 05:29

## 2022-12-09 RX ADMIN — ACETAMINOPHEN 975 MG: 325 TABLET ORAL at 22:21

## 2022-12-09 NOTE — PROGRESS NOTES
Progress Note -Interventional Radiology  Dwayne Hodges 61 y o  male MRN: [id number]  Unit/Bed#: [facility] 906-01 Encounter: [id number]    Assessment/Plan:    41-year-old male with cholecystitis and abdominal abscess and area of prior hepatectomy  History significant for colon cancer with mets to liver, colectomy, colostomy, ileostomy  Cholecystostomy  -Drain site well-appearing, minimal tenderness  -360cc output documented since placement yesterday, bilious  -Continue drain flushes as ordered  -Outpatient flushes and tube check ordered  -Contact IR with any questions or concerns    Hepatectomy abscess  -Drain site well-appearing, minimal tenderness  -145cc output documented since placement yesterday, purulent yellow  -Continue drain flushes as ordered  -Outpatient flushes and tube check ordered  -Contact IR with any questions or concerns  Medical Problems     Problem List     * (Principal) Malignant neoplasm of transverse colon ([facility] [address] )    Type 2 diabetes mellitus without complication, with long-term current use of insulin ([facility] )    Lab Results   Component Value Date    HGBA1C 8 0 (H) 09/26/2022       Recent Labs     12/08/22  1207 12/08/22  1632 12/08/22 2017 12/09/22  0754   POCGLU 146* 153* 159* 136       Blood Sugar Average: Last 72 hrs:  (P) 246 [id number]        Benign essential hypertension    Mixed hyperlipidemia    Erectile dysfunction    Low testosterone    Obesity (BMI 30-39  9)    Testicular hypogonadism    Incarcerated umbilical hernia    Overview Signed 3/26/2019  2:10 PM by Yelitza Maldonado MD     Added automatically from request for surgery [id number]         Left ureteral calculus    Type 2 diabetes mellitus with hyperlipidemia [facility])    Lab Results   Component Value Date    HGBA1C 8 0 (H) 09/26/2022       Recent Labs     12/08/22  1207 12/08/22  1632 12/08/22 2017 12/09/22  0754   POCGLU 146* 153* 159* 136       Blood Sugar Average: Last 72 hrs:  (P) 855 [id number]        Colonic mass Microcytic anemia    Hypokalemia    Transaminitis    Thrombocytosis    Iron deficiency anemia, unspecified    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)    Colon cancer metastasized to liver [facility])    Colostomy prolapse (HCC)    Other fatigue    Cervical radiculopathy    Encephalopathy    Flash pulmonary edema (HCC)    MR (mitral regurgitation)    Bacteremia    ESBL (extended spectrum beta-lactamase) producing bacteria infection    Acute respiratory failure with hypoxia (HCC)             Subjective: Today the patient reports feeling okay  He states that he does have some tenderness around the drain sites but otherwise states that he is "fine"  At the time of evaluation the patient has received and is eating toast for a small meal   He is awake and alert  He does endorse some sweats throughout last evening and earlier today  Objective:    Vitals:  /72   Pulse 85   Temp (!) 97 °F (36 1 °C)   Resp 18   Ht 5' 7 99" (1 727 m)   Wt 103 kg (226 lb 3 1 oz)   SpO2 94%   BMI 34 40 kg/m²   Body mass index is 34 4 kg/m²  Weight (last 2 days)     None          I/Os:    Intake/Output Summary (Last 24 hours) at 12/9/2022 1108  Last data filed at 12/9/2022 1004  Gross per 24 hour   Intake 1960 ml   Output 1640 ml   Net 320 ml       Invasive Devices     Central Venous Catheter Line  Duration           Port A Cath 03/10/22 Right Chest 274 days          Peripheral Intravenous Line  Duration           Peripheral IV 12/08/22 Dorsal (posterior); Right Hand 1 day          Drain  Duration           Ileostomy LUQ 21 days    Abscess Drain Abdomen <1 day    Cholecystostomy Tube <1 day                Physical Exam  Vitals and nursing note reviewed  Constitutional:       General: He is not in acute distress  Appearance: Normal appearance  He is well-developed and well-groomed  He is ill-appearing  HENT:      Head: Normocephalic and atraumatic        Right Ear: External ear normal       Left Ear: External ear normal       Nose: Nose normal       Mouth/Throat:      Lips: Pink  Mouth: Mucous membranes are moist    Eyes:      General: Lids are normal  Gaze aligned appropriately  Cardiovascular:      Rate and Rhythm: Normal rate  Pulses: Normal pulses  Pulmonary:      Effort: Pulmonary effort is normal  No respiratory distress  Breath sounds: No wheezing or rhonchi  Abdominal:      General: Bowel sounds are normal       Palpations: Abdomen is soft  Comments: Mild generalized abdominal discomfort, midline abdominal wound, right-sided cholecystostomy, anterior abdominal abscess drain in place  Drain sites appear well  Minimal local tenderness  Skin:     General: Skin is warm  Capillary Refill: Capillary refill takes less than 2 seconds  Neurological:      General: No focal deficit present  Mental Status: He is alert and oriented to person, place, and time  Mental status is at baseline  Psychiatric:         Behavior: Behavior is cooperative                       Lab Results and Cultures:   CBC with diff:   Lab Results   Component Value Date    WBC 21 51 (H) 12/09/2022    HGB 8 2 (L) 12/09/2022    HCT 25 9 (L) 12/09/2022    MCV 87 12/09/2022     (H) 12/09/2022    MCH 27 6 12/09/2022    MCHC 31 7 12/09/2022    RDW 16 7 (H) 12/09/2022    MPV 10 6 12/09/2022    NRBC 0 12/08/2022      BMP/CMP:  Lab Results   Component Value Date     05/02/2017    K 4 4 12/09/2022    K 4 1 09/26/2022     12/09/2022     09/26/2022    CO2 19 (L) 12/09/2022    CO2 27 11/16/2022    CO2 21 09/26/2022    BUN 22 12/09/2022    BUN 21 09/26/2022    CREATININE 1 46 (H) 12/09/2022    CREATININE 1 18 05/02/2017    GLUCOSE 151 (H) 11/16/2022    GLUCOSE 118 (H) 05/02/2017    CALCIUM 8 0 (L) 12/09/2022    CALCIUM 9 5 05/02/2017    AST 67 (H) 11/25/2022    AST 22 09/26/2022    ALT 19 11/25/2022    ALT 21 09/26/2022    ALKPHOS 436 (H) 11/25/2022    ALKPHOS 94 04/10/2017    PROT 6 2 04/10/2017 BILITOT 0 6 04/10/2017    EGFR 52 12/09/2022   ,     Coags:   Lab Results   Component Value Date    PTT 37 11/09/2022    INR 1 10 11/12/2022   ,          Lipid Panel:   Lab Results   Component Value Date    CHOL 91 (L) 04/10/2017     Lab Results   Component Value Date    HDL 51 08/19/2022     Lab Results   Component Value Date    HDL 51 08/19/2022     Lab Results   Component Value Date    LDLCALC 77 08/19/2022     Lab Results   Component Value Date    TRIG 157 (H) 08/19/2022       HgbA1c:   Lab Results   Component Value Date    HGBA1C 8 0 (H) 09/26/2022    HGBA1C 8 4 (H) 08/19/2022    HGBA1C 8 7 (H) 05/27/2022       Blood Culture:   Lab Results   Component Value Date    BLOODCX Received in Microbiology Lab  Culture in Progress  12/08/2022   ,   Urinalysis:   Lab Results   Component Value Date    COLORU Yellow 11/16/2022    COLORU Yellow 04/10/2017    CLARITYU Turbid 11/16/2022    SPECGRAV 1 047 (H) 11/16/2022    SPECGRAV 1 028 04/10/2017    PHUR 5 0 11/16/2022    PHUR 5 5 04/10/2017    LEUKOCYTESUR Negative 11/16/2022    LEUKOCYTESUR Negative 04/10/2017    NITRITE Negative 11/16/2022    NITRITE Negative 04/10/2017    PROTEINUA Trace 04/10/2017    GLUCOSEU Trace (A) 11/16/2022    KETONESU Negative 11/16/2022    KETONESU Negative 04/10/2017    BILIRUBINUR Small (A) 11/16/2022    BILIRUBINUR Negative 04/10/2017    BLOODU Trace (A) 11/16/2022   ,   Urine Culture:   Lab Results   Component Value Date    URINECX No Growth <1000 cfu/mL 08/23/2016   ,   Wound Culure:  No results found for: WOUNDCULT    VTE Pharmacologic Prophylaxis: Sequential compression device (Venodyne)  and Heparin      Thank you for allowing me to participate in the care of Celena Auburn  Please don't hesitate to call, text, email, or TigerText with any questions  This text is generated with voice recognition software  There may be translation, syntax, or grammatical errors  If you have any questions, please contact the dictating provider

## 2022-12-09 NOTE — PROGRESS NOTES
Progress Note - Surgical Oncology  Severo Herr 61 y o  male MRN: [id number]  Unit/Bed#: [facility] 906-01 Encounter: [id number]      Objective: Patient states he feels much better today  He is alert and orientated x3  States he is hungry  Denies nausea, no emesis  There has been absolutely minimal drainage from his midline wound now that his IR drain has been placed  Patient now has a PTC catheter, and IR drain within the liver collection, and his ileostomy  His ileostomy is functioning well he has liquids as well as soft stool  His IR drain is more of a tannish fluid and his PTC catheter is very dark in color looking like old blood  The PTC catheter is to drainage, the IR drain is to bulb suction  The PTC catheter dressing was saturated with serous blood and was changed this morning  His midline also the dressing was changed there is minimal drainage on the 4 x 4 and abdominal pad  Patient's blood sugars are 150, 153, 146  His a m  labs are pending  Patient continues on Zosyn started on 12/8/2022  Blood culture results are pending  There is been no fevers, denies chills  His heart rate now is 102 down from the 120s yesterday  1000 UA  245 PTC  135 IR drain  625 ileostomy    Blood pressure 134/81, pulse 102, temperature (!) 97 3 °F (36 3 °C), resp  rate 18, height 5' 7 99" (1 727 m), weight 103 kg (226 lb 3 1 oz), SpO2 94 %  ,Body mass index is 34 4 kg/m²  Intake/Output Summary (Last 24 hours) at 12/9/2022 0519  Last data filed at 12/9/2022 0201  Gross per 24 hour   Intake 3150 42 ml   Output 2005 ml   Net 1145 42 ml       Invasive Devices     Central Venous Catheter Line  Duration           Port A Cath 03/10/22 Right Chest 273 days          Peripheral Intravenous Line  Duration           Peripheral IV 12/08/22 Dorsal (posterior); Right Hand <1 day          Drain  Duration           Ileostomy LUQ 21 days    Abscess Drain Abdomen <1 day    Cholecystostomy Tube <1 day                Physical Exam:   Abdomen is soft, does not appear distended, the IR drain which is in more of the epigastric region and off to his left side is dark tannish coloration, his PTC catheter in the right upper quadrant is to gravity drainage and old blood appears to be draining, midline wound dressing was changed [place] was placed midline, the lower midline back salt is completely dry his upper Mes[place] had minimal drainage, wounds appear clean there is granulation tissue, dry dressings applied, ileostomy has air as well as soft stool  Patient tolerated the dressing change extremely well, very little pain  Extremities: No calf tenderness bilaterally, he has no pedal edema, on Lovenox prophylactic for DVT prophylaxis  Lab, Imaging and other studies: Pending  VTE Pharmacologic Prophylaxis: Enoxaparin (Lovenox)  VTE Mechanical Prophylaxis: sequential compression device    Assessment:  Status post exploratory laparotomy with segment 3 liver resection, transverse colectomy, left colectomy, ileocolonic anastomosis, diverting loop ileostomy  History of diabetes type 2  Day #1 PTC placement  Day # 1 IR drain placement liver collection    Plan:  1  Diabetic diet  2  Continue infectious disease recommendations  3  To be out of bed and ambulating the halls and working with physical therapy  4  Check a m  labs specifically CBC  5  Continue ileostomy care and teaching  6  Drain care per IR  7  Since minimal drainage from the midline incision would hold off on wound manager  8  Continue accurate I/O's  9  Sent to spirometry to be worked with 10 times hourly while awake  10    Continue Lovenox 40 mg subcu daily for DVT prophylaxis

## 2022-12-09 NOTE — PROGRESS NOTES
Progress Note - Infectious Disease   uJdy Cervantes 61 y o  male MRN: [id number]  Unit/Bed#: [facility] 906-01 Encounter: [id number]      Impression/Plan:  1  Sepsis- evolving since admission  Leukocytosis and tachycardia  Appears to be secondary to recurrent intra-abdominal abscesses  Consideration for the possibility of bacteremia once again  Fortunately the patient remained hemodynamically stable despite his systemic illness  -Continue Zosyn for now with current dose  -Follow-up blood cultures and fluid cultures and adjust antibiotics needed  -Source control measures as below  -Recheck CBC with differential and CMP  -Supportive care     2  Intra-abdominal abscesses-in the setting of a recent complex surgical history including resection of segments of the liver, transverse colon resection, intra-abdominal abscesses requiring drainage and ileostomy  Patient now with possible leak into the region of the previous liver resections and 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 with succus drainage of the wound  Patient now status post VICTORINO drain placement with purulent fluid sent for culture and a drain remaining in place  -Antibiotics as above  -Follow-up cultures and adjust antibiotics needed  -Drain management  -Close surgical follow-up     3  Possible acute cholecystitis-although not much in the way of abdominal tenderness in the right upper quadrant  Patient status post percutaneous cholecystotomy drain  -Drain management  -Antibiotics as above     4   Recent anastomotic leak with intra-abdominal abscess/infected hematoma- status post exploratory laparotomy with drainage of the abscess and cecal resection with wound culture growing ESBL E  Coli  -Antibiotics as above  -Source control measures as above     5  Recent ESBL E  coli bacteremia-as a complication of the recent anastomotic leak with intra-abdominal abscess formation    Patient received a course of ertapenem for 7 days postoperatively   -Antibiotics as above for now  -Follow-up blood cultures     6  Diabetes mellitus-type II    Discussed the above management plan with the primary service    Will see the patient again 2022  Please call for questions  Antibiotics:  Zosyn 2  Post drain placement 1    Subjective:  Patient has no fever, chills, sweats; no nausea, vomiting, diarrhea; no cough, shortness of breath; no increased pain just some pain around the drain site  No new symptoms  Objective:  Vitals:  Temp:  [97 °F (36 1 °C)-97 5 °F (36 4 °C)] 97 °F (36 1 °C)  HR:  [] 85  Resp:  [16-20] 18  BP: (123-158)/(72-90) 132/72  SpO2:  [91 %-98 %] 94 %  Temp (24hrs), Av 2 °F (36 2 °C), Min:97 °F (36 1 °C), Max:97 5 °F (36 4 °C)  Current: Temperature: (!) 97 °F (36 1 °C)    Physical Exam:   General Appearance:  Somnolent, arousable, nontoxic, no acute distress  Throat: Oropharynx moist without lesions  Lungs:   Clear to auscultation bilaterally; no wheezes, rhonchi or rales; respirations unlabored   Heart:  RRR; no murmur, rub or gallop   Abdomen:   Soft, non-tender, non-distended, positive bowel sounds  Right-sided percutaneous cholecystotomy drain in place  Right-sided VICTORINO drain in place  Extremities: No clubbing, cyanosis or edema   Skin: No new rashes or lesions  No draining wounds noted         Labs, Imaging, & Other studies:   All pertinent labs and imaging studies were personally reviewed  Results from last 7 days   Lab Units 22  0541 22  0440 22  0037   WBC Thousand/uL 21 51* 28 29* 10 53*   HEMOGLOBIN g/dL 8 2* 9 2* 10 5*   PLATELETS Thousands/uL 416* 416* 361     Results from last 7 days   Lab Units 22  0541 22  1018 22  0037   SODIUM mmol/L 132* 135 136   POTASSIUM mmol/L 4 4 4 1 3 8   CHLORIDE mmol/L 106 110* 110*   CO2 mmol/L 19* 18* 20*   BUN mg/dL 22 18 18   CREATININE mg/dL 1 46* 1 25 1 33*   EGFR ml/min/1 73sq m 52 63 58   CALCIUM mg/dL 8 0* 8 5 8 2*     Results from last 7 days   Lab Units 12/08/22  1903 12/08/22  1836 12/08/22  1619 12/08/22  1618   BLOOD CULTURE   --   --  Received in Microbiology Lab  Culture in Progress  Received in Microbiology Lab  Culture in Progress     GRAM STAIN RESULT  4+ Gram negative rods*  No polys seen* No Polys or Bacteria seen  --   --

## 2022-12-09 NOTE — SEDATION DOCUMENTATION
IR cholecystomy tube and abdominal abscess drain tube placement by Dr Copeland  Patient tolerated procedure fine  Both fluid sent for cultures  Alvina tube placed to drainage bag and abscess drain placed to lyric bulb  Report given to floor RN

## 2022-12-09 NOTE — BRIEF OP NOTE (RAD/CATH)
INTERVENTIONAL RADIOLOGY PROCEDURE NOTE    Date: 12/8/2022    Procedure:   Perc radha insertion  Hepatectomy abscess drainage    Preoperative diagnosis:   1  Type 2 diabetes mellitus without complication, with long-term current use of insulin ([facility] [address] )    2  Acute respiratory failure with hypoxia (HCC)    3  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 liver ([facility] [address] )    4  Colonic mass    5  Colostomy prolapse ([facility] [address] )    6  Postprocedural intraabdominal abscess    7  Cholecystitis         Postoperative diagnosis: Same  Surgeon: Jovanni Adams MD     Assistant: None  No qualified resident was available  Blood loss: 0 ml    Specimens: sent to lab     Findings:   10 fr perc radha returned 100 mls bile  10 fr hepatectomy abscess drain returned 100 ml pus  Complications: None immediate      Anesthesia: none

## 2022-12-09 NOTE — UTILIZATION REVIEW
NOTIFICATION OF INPATIENT ADMISSION   AUTHORIZATION REQUEST   SERVICING FACILITY:   [facility]  Address: [address] [place] [address], [address] [place] [address]  Tax ID: [id number]  NPI: [id number] ATTENDING PROVIDER:  Attending Name and NPI#: Moon Valencia Md [[id number]]  Address: [address] [place] [address]  Phone: [phone]   ADMISSION INFORMATION:  Place [facility] Service[facility]patient [facility]  Place of Service Code: 21  Inpatient Admission Date/Time: 12/7/22  3:08 PM  Discharge Date/Time: No discharge date for patient encounter  Admitting Diagnosis Code/Description:  Altered bowel elimination due to intestinal ostomy ([facility] [address] ) [K94 19]     UTILIZATION REVIEW CONTACT:  Paula Lopez Utilization   Network Utilization Review Department  Phone: [phone]  Fax: [phone]  Email: [email or web address]  Contact for approvals/pending authorizations, clinical reviews, and discharge  PHYSICIAN ADVISORY SERVICES:  Medical Necessity Denial & Yizj[employer]re-Yj[employer] Review  Phone: [phone]  Fax: [phone]  Email: [email or web address]

## 2022-12-09 NOTE — ARC ADMISSION
ARC continues to follow patient's case at this time, monitoring for medical stability and functional progress  Will update as able

## 2022-12-09 NOTE — RESTORATIVE TECHNICIAN NOTE
Restorative Technician Note      Patient Name: Tamera Rizzo     Restorative Tech Visit Date: 12/09/22  Note Type: Mobility  Patient Position Upon Consult: Supine  Activity Performed: Repositioned  Assistive Device: Other (Comment) (pt performed bed mobility for ADLs and adjustment in bed for maximum comfort)  Patient Position at End of Consult: Supine;  All needs within [facility]

## 2022-12-09 NOTE — PLAN OF CARE
Problem: PAIN - ADULT  Goal: Verbalizes/displays adequate comfort level or baseline comfort level  Description: Interventions:  - Encourage patient to monitor pain and request assistance  - Assess pain using appropriate pain scale  - Administer analgesics based on type and severity of pain and evaluate response  - Implement non-pharmacological measures as appropriate and evaluate response  - Consider cultural and social influences on pain and pain management  - Notify physician/advanced practitioner if interventions unsuccessful or patient reports new pain  Outcome: Progressing     Problem: INFECTION - ADULT  Goal: Absence or prevention of progression during hospitalization  Description: INTERVENTIONS:  - Assess and monitor for signs and symptoms of infection  - Monitor lab/diagnostic results  - Monitor all insertion sites, i e  indwelling lines, tubes, and drains  - Monitor endotracheal if appropriate and nasal secretions for changes in amount and color  - [place] appropriate cooling/warming therapies per order  - Administer medications as ordered  - Instruct and encourage patient and family to use good hand hygiene technique  - Identify and instruct in appropriate isolation precautions for identified infection/condition  Outcome: Progressing

## 2022-12-09 NOTE — CASE MANAGEMENT
Case Management Discharge Planning Note    Patient name Sydell Form  Location [facility]06/[facility] 267-33 MRN [id number]  : 1965 Date 2022       Current Admission Date: 2022  Current Admission Diagnosis:Malignant neoplasm of transverse colon [facility])   Patient Active Problem List    Diagnosis Date Noted   • Flash pulmonary edema ([address] ) 2022   • MR (mitral regurgitation) 2022   • Bacteremia 2022   • ESBL (extended spectrum beta-lactamase) producing bacteria infection 2022   • Acute respiratory failure with hypoxia ([facility] [address] ) 2022   • Encephalopathy 2022   • Cervical radiculopathy 10/26/2022   • Other fatigue 06/15/2022   • Colostomy prolapse ([facility] [address] ) 2022   • Colon cancer metastasized to liver ([facility] [address] ) 2022   • 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 liver ([facility] ) 2022   • Iron deficiency anemia, unspecified 2022   • Malignant neoplasm of transverse colon ([facility] [address] ) 2022   • Thrombocytosis 2022   • Hypokalemia 2022   • Transaminitis 2022   • Type 2 diabetes mellitus with hyperlipidemia ([facility] [address] ) 2022   • Colonic mass 2022   • Microcytic anemia 2022   • Left ureteral calculus 2020   • Incarcerated umbilical hernia 5115   • Testicular hypogonadism 2017   • Low testosterone 2017   • Type 2 diabetes mellitus without complication, with long-term current use of insulin ([facility] ) 2016   • Benign essential hypertension 2016   • Mixed hyperlipidemia 2016   • Erectile dysfunction 2016   • Obesity (BMI 30-39 9) 2016      LOS (days): 2  Geometric Mean LOS (GMLOS) (days):   Days to GMLOS:     OBJECTIVE:  Risk of Unplanned Readmission Score: 32 66         Current admission status: Inpatient   Preferred Pharmacy:   [facility]pharmacy [facility][id number] - [place], [place] [address] [address] [place] [address]  Phone: [phone] Fax: [phone] [address]  [address]  [facility] 3  [address] [place] [address]  Phone: [phone] Fax: [phone]    [id number] [place] - [address] [facility] [address]  Phone: [phone] Fax: 123 [address], [address] [place] [facility] [address] [facility] [address] [address]  Phone: [phone] Fax: [phone]    Primary Care Provider: Aliyah Mccoy MD    Primary Insurance: [employer]  Secondary Insurance:     DISCHARGE DETAILS:                           Other Referral/Resources/Interventions Provided:  Referral Comments: CM continues to communicate with [facility] regarding discharge planning  Auth has not yet been received  At this time the insurance is currently working on hospital authorization as they are currently stating that they were unaware that the patient was inpatient in the hospice  CM has communicated this information to  to notify the insurance company  Additional Comments: JIMENA did speak with provider  patient is not medically cleared for discharge at this time  Patient continues to have WBC count monitored  In addition, patient now with additional tube

## 2022-12-10 LAB
ALBUMIN SERPL BCP-MCNC: 1 G/DL (ref 3.5–5)
ALP SERPL-CCNC: 961 U/L (ref 46–116)
ALT SERPL W P-5'-P-CCNC: 37 U/L (ref 12–78)
AMORPH URATE CRY URNS QL MICRO: ABNORMAL
ANION GAP SERPL CALCULATED.3IONS-SCNC: 10 MMOL/L (ref 4–13)
AST SERPL W P-5'-P-CCNC: 122 U/L (ref 5–45)
BACTERIA UR QL AUTO: ABNORMAL /HPF
BILIRUB SERPL-MCNC: 1.68 MG/DL (ref 0.2–1)
BILIRUB UR QL STRIP: NEGATIVE
BUN SERPL-MCNC: 27 MG/DL (ref 5–25)
CALCIUM ALBUM COR SERPL-MCNC: 10.2 MG/DL (ref 8.3–10.1)
CALCIUM SERPL-MCNC: 7.8 MG/DL (ref 8.3–10.1)
CHLORIDE SERPL-SCNC: 107 MMOL/L (ref 96–108)
CHLORIDE UR-SCNC: 35 MMOL/L
CLARITY UR: ABNORMAL
CO2 SERPL-SCNC: 20 MMOL/L (ref 21–32)
COLOR UR: YELLOW
CREAT SERPL-MCNC: 1.94 MG/DL (ref 0.6–1.3)
GFR SERPL CREATININE-BSD FRML MDRD: 37 ML/MIN/1.73SQ M
GLUCOSE SERPL-MCNC: 169 MG/DL (ref 65–140)
GLUCOSE SERPL-MCNC: 183 MG/DL (ref 65–140)
GLUCOSE SERPL-MCNC: 213 MG/DL (ref 65–140)
GLUCOSE SERPL-MCNC: 219 MG/DL (ref 65–140)
GLUCOSE SERPL-MCNC: 244 MG/DL (ref 65–140)
GLUCOSE UR STRIP-MCNC: ABNORMAL MG/DL
HGB UR QL STRIP.AUTO: ABNORMAL
KETONES UR STRIP-MCNC: NEGATIVE MG/DL
LEUKOCYTE ESTERASE UR QL STRIP: NEGATIVE
MUCOUS THREADS UR QL AUTO: ABNORMAL
NITRITE UR QL STRIP: NEGATIVE
NON-SQ EPI CELLS URNS QL MICRO: ABNORMAL /HPF
PH UR STRIP.AUTO: 5.5 [PH]
POTASSIUM SERPL-SCNC: 3.9 MMOL/L (ref 3.5–5.3)
PROT SERPL-MCNC: 6.5 G/DL (ref 6.4–8.4)
PROT UR STRIP-MCNC: ABNORMAL MG/DL
RBC #/AREA URNS AUTO: ABNORMAL /HPF
SODIUM 24H UR-SCNC: 32 MOL/L
SODIUM SERPL-SCNC: 137 MMOL/L (ref 135–147)
SP GR UR STRIP.AUTO: 1.02 (ref 1–1.03)
UROBILINOGEN UR STRIP-ACNC: <2 MG/DL
WBC #/AREA URNS AUTO: ABNORMAL /HPF

## 2022-12-10 RX ORDER — ALBUMIN (HUMAN) 12.5 G/50ML
25 SOLUTION INTRAVENOUS EVERY 6 HOURS
Status: COMPLETED | OUTPATIENT
Start: 2022-12-10 | End: 2022-12-10

## 2022-12-10 RX ORDER — SODIUM CHLORIDE, SODIUM GLUCONATE, SODIUM ACETATE, POTASSIUM CHLORIDE, MAGNESIUM CHLORIDE, SODIUM PHOSPHATE, DIBASIC, AND POTASSIUM PHOSPHATE .53; .5; .37; .037; .03; .012; .00082 G/100ML; G/100ML; G/100ML; G/100ML; G/100ML; G/100ML; G/100ML
500 INJECTION, SOLUTION INTRAVENOUS ONCE
Status: COMPLETED | OUTPATIENT
Start: 2022-12-10 | End: 2022-12-10

## 2022-12-10 RX ADMIN — Medication 1 TABLET: at 08:32

## 2022-12-10 RX ADMIN — PANTOPRAZOLE SODIUM 40 MG: 40 TABLET, DELAYED RELEASE ORAL at 06:03

## 2022-12-10 RX ADMIN — INSULIN LISPRO 4 UNITS: 100 INJECTION, SOLUTION INTRAVENOUS; SUBCUTANEOUS at 17:32

## 2022-12-10 RX ADMIN — METHOCARBAMOL TABLETS 500 MG: 500 TABLET, COATED ORAL at 08:32

## 2022-12-10 RX ADMIN — HEPARIN SODIUM 5000 UNITS: 5000 INJECTION INTRAVENOUS; SUBCUTANEOUS at 22:52

## 2022-12-10 RX ADMIN — INSULIN LISPRO 4 UNITS: 100 INJECTION, SOLUTION INTRAVENOUS; SUBCUTANEOUS at 08:28

## 2022-12-10 RX ADMIN — SODIUM CHLORIDE, SODIUM GLUCONATE, SODIUM ACETATE, POTASSIUM CHLORIDE AND MAGNESIUM CHLORIDE 500 ML: 526; 502; 368; 37; 30 INJECTION, SOLUTION INTRAVENOUS at 08:41

## 2022-12-10 RX ADMIN — PIPERACILLIN AND TAZOBACTAM 4.5 G: 36; 4.5 INJECTION, POWDER, FOR SOLUTION INTRAVENOUS at 17:08

## 2022-12-10 RX ADMIN — ATORVASTATIN CALCIUM 40 MG: 40 TABLET, FILM COATED ORAL at 08:31

## 2022-12-10 RX ADMIN — METHOCARBAMOL TABLETS 500 MG: 500 TABLET, COATED ORAL at 17:10

## 2022-12-10 RX ADMIN — CHOLECALCIFEROL TAB 10 MCG (400 UNIT) 400 UNITS: 10 TAB at 08:32

## 2022-12-10 RX ADMIN — INSULIN LISPRO 4 UNITS: 100 INJECTION, SOLUTION INTRAVENOUS; SUBCUTANEOUS at 12:45

## 2022-12-10 RX ADMIN — ASPIRIN 81 MG CHEWABLE TABLET 81 MG: 81 TABLET CHEWABLE at 08:32

## 2022-12-10 RX ADMIN — PIPERACILLIN AND TAZOBACTAM 4.5 G: 36; 4.5 INJECTION, POWDER, FOR SOLUTION INTRAVENOUS at 04:34

## 2022-12-10 RX ADMIN — ACETAMINOPHEN 975 MG: 325 TABLET ORAL at 06:03

## 2022-12-10 RX ADMIN — TAMSULOSIN HYDROCHLORIDE 0.4 MG: 0.4 CAPSULE ORAL at 17:10

## 2022-12-10 RX ADMIN — PANTOPRAZOLE SODIUM 40 MG: 40 TABLET, DELAYED RELEASE ORAL at 17:30

## 2022-12-10 RX ADMIN — PIPERACILLIN AND TAZOBACTAM 4.5 G: 36; 4.5 INJECTION, POWDER, FOR SOLUTION INTRAVENOUS at 12:45

## 2022-12-10 RX ADMIN — ALBUMIN (HUMAN) 25 G: 0.25 INJECTION, SOLUTION INTRAVENOUS at 14:57

## 2022-12-10 RX ADMIN — ACETAMINOPHEN 975 MG: 325 TABLET ORAL at 15:05

## 2022-12-10 RX ADMIN — ALBUMIN (HUMAN) 25 G: 0.25 INJECTION, SOLUTION INTRAVENOUS at 22:53

## 2022-12-10 RX ADMIN — PIPERACILLIN AND TAZOBACTAM 4.5 G: 36; 4.5 INJECTION, POWDER, FOR SOLUTION INTRAVENOUS at 23:50

## 2022-12-10 RX ADMIN — INSULIN LISPRO 4 UNITS: 100 INJECTION, SOLUTION INTRAVENOUS; SUBCUTANEOUS at 12:43

## 2022-12-10 RX ADMIN — HEPARIN SODIUM 5000 UNITS: 5000 INJECTION INTRAVENOUS; SUBCUTANEOUS at 15:02

## 2022-12-10 RX ADMIN — INSULIN LISPRO 2 UNITS: 100 INJECTION, SOLUTION INTRAVENOUS; SUBCUTANEOUS at 17:31

## 2022-12-10 RX ADMIN — OXYCODONE HYDROCHLORIDE 10 MG: 10 TABLET ORAL at 23:04

## 2022-12-10 RX ADMIN — AMLODIPINE BESYLATE 5 MG: 5 TABLET ORAL at 17:10

## 2022-12-10 RX ADMIN — INSULIN GLARGINE 15 UNITS: 100 INJECTION, SOLUTION SUBCUTANEOUS at 23:04

## 2022-12-10 RX ADMIN — AMLODIPINE BESYLATE 5 MG: 5 TABLET ORAL at 08:31

## 2022-12-10 RX ADMIN — SODIUM BICARBONATE 100 ML/HR: 84 INJECTION, SOLUTION INTRAVENOUS at 17:39

## 2022-12-10 RX ADMIN — INSULIN LISPRO 2 UNITS: 100 INJECTION, SOLUTION INTRAVENOUS; SUBCUTANEOUS at 08:28

## 2022-12-10 NOTE — PROGRESS NOTES
Progress Note - Lillie Kaur 61 y o  male MRN: [id number]    Unit/Bed#: [facility] 906-01 Encounter: [id number]      Assessment:  62-year-old male status post segment 3 liver resection with transverse colectomy and takedown of colostomy followed by diverting loop ileostomy and right hemicolectomy for anastomotic leak  Now status post IR drainage of intra-abdominal abscess and gallbladder    Vss  Afebrile  abd soft, nontender, non distended  UOP: 850 cc  0 3 cc/kg/h  Perc radha: 225 cc bilious output  IR liver abscess: 50 cc purulent output  Ileostomy: 1L     Plan:  Diabetic diet  Continue zosyn for liver abscess  nephro consult for ALEXY on CKD, avoid nephrotoxic meds, bicarb gtt, appreciate nephro recs  Follow up creatinine  dvt ppx  Local wound care to midline wound  Subjective:   Feels well  Abdominal pain has improved last 2 days  Has an appetite  Denied fever, chills or night sweats  Objective:     Vitals: Blood pressure 143/74, pulse 79, temperature (!) 97 °F (36 1 °C), resp  rate 16, height 5' 7 99" (1 727 m), weight 109 kg (239 lb 13 8 oz), SpO2 97 %  ,Body mass index is 36 48 kg/m²  Intake/Output Summary (Last 24 hours) at 12/10/2022 0827  Last data filed at 12/10/2022 0802  Gross per 24 hour   Intake 2560 ml   Output 2150 ml   Net 410 ml       Physical Exam  General: NAD  HEENT: NC/AT  MMM  Cv: RRR  Lungs: normal effort  Ab: Soft, NT/ND  Perc radha w bilious output  IR hepatic drain w purulent drainage     Ex: no CCE  Neuro: AAOx3    Scheduled Meds:  Current Facility-Administered Medications   Medication Dose Route Frequency Provider Last Rate   • acetaminophen  975 mg Oral [facility] Nayla Byrne MD     • amLODIPine  5 mg Oral BID Nayla Byrne MD     • aspirin  81 mg Oral Daily Nayla Byrne MD     • atorvastatin  40 mg Oral Daily Nayla Byrne MD     • calcium carbonate  500 mg Oral BID PRN Reynold Carroll MD     • cholecalciferol  400 Units Oral Daily Nayla Byrne MD • heparin (porcine)  5,000 Units Subcutaneous Q8H [address] Maggy Villatoro PA-C     • HYDROmorphone  0 5 mg Intravenous Q3H PRN Juan Monsivais MD     • insulin glargine  15 Units Subcutaneous HS Jenna Decker MD     • insulin lispro  2-12 Units Subcutaneous TID AC Donovan Allen MD     • insulin lispro  4 Units Subcutaneous TID With Meals Chito Medel MD     • lidocaine (PF)    PRN Maria Del Carmen Costa MD     • methocarbamol  500 mg Oral BID Jenna Decker MD     • metoprolol  5 mg Intravenous Q8H PRN Maggy Villatoro PA-C     • multi-electrolyte  500 mL Intravenous Once Juan Monsivais MD     • multivitamin-minerals  1 tablet Oral Daily Jenna Decker MD     • ondansetron  4 mg Oral Q6H PRN Jenna Decker MD     • oxyCODONE  10 mg Oral Q6H PRN Jenna Decker MD     • oxyCODONE  5 mg Oral Q6H PRN Jenna Decker MD     • pantoprazole  40 mg Oral BID AC Jenna Decker MD     • piperacillin-tazobactam  4 5 g Intravenous Q6H Donovan Allen MD 4 5 g (12/10/22 0434)   • sodium bicarbonate infusion  100 mL/hr Intravenous Continuous Juan Monsivais  mL/hr (12/10/22 0802)   • tamsulosin  0 4 mg Oral Daily With Leonila St MD       Continuous Infusions:sodium bicarbonate infusion, 100 mL/hr, Last Rate: 100 mL/hr (12/10/22 0802)      PRN Meds:  •  calcium carbonate  •  HYDROmorphone  •  lidocaine (PF)  •  metoprolol  •  ondansetron  •  oxyCODONE  •  oxyCODONE      Invasive Devices     Central Venous Catheter Line  Duration           Port A Cath 03/10/22 Right Chest 274 days          Peripheral Intravenous Line  Duration           Peripheral IV 12/08/22 Dorsal (posterior); Right Hand 2 days          Drain  Duration           Ileostomy LUQ 22 days    Abscess Drain Abdomen 1 day    Cholecystostomy Tube 1 day                Lab, Imaging and other studies: I have personally reviewed pertinent reports      VTE Pharmacologic Prophylaxis: Sequential compression device (Venodyne)   VTE Mechanical Prophylaxis: sequential compression device

## 2022-12-10 NOTE — CONSULTS
Consultation    Nephrology   Moiz Maryan 61 y o  male MRN: [id number]  Unit/Bed#: [facility] 906-01 Encounter: [id number]    History of Present Illness   Physician Requesting Consult: Felix Parish MD  Reason for Consult : -Acute kidney injury    ASSESSMENT/PLAN:   61 y o   male with pmh of diabetes, hypertension,  obesity, metastatic colon cancer status post recent surgery with partial colectomy and diverting ileostomy complicated with intra-abdominal abscess who was admitted on 12/4/2022 with abdominal pain from nursing facility  Nephrology has been consulted for evaluation management of acute kidney injury on 12/10/2022  Acute kidney injury :  ALEXY multifactorial most likely secondary to prerenal azotemia plus some component of infection plus RANDY (12/8) plus failure to autoregulate presence of ACE inhibitor plus increase susceptibility acute kidney injury due to recent episode of acute kidney injury and subsequent ATN  After review of records In [facility] as well as Care everywhere it appears that the patient has a baseline Creatinine of 1 1-1 6 mg/dL  patient was admitted with a creatinine of 1 16 mg/dL on 12/4/2022  Most recent labs from 12/9/2020 with a creatinine 1 71 mg/dL  patient's creatinine today is still pending we discussed with nurse with regards to redrawing right now stat  On D5 water +150 mg sodium bicarb at 100 cc an hour  Awaiting today's labs to adjust IV fluids  Cynically appears to be hypovolemic  check BMP in a m  Await renal recovery  Optimize hemodynamic status to avoid delay in renal recovery  Avoid nephrotoxins, adjust meds to appropriate GFR  Strict I/O  Daily weights  Urinary retention protocol if patient does not have a Negro  will need to set up patient for follow up with Nephrology as an outpatient post hospitalization    as an outpatient for nephrology, patient follows up with nephrologist    Blood pressure/hypertension:  current medications: Norvasc 5 mg p o  twice daily  recommendations: Hold lisinopril for now, adjusted hold parameters  Optimize hemodynamics  Maintain MAP > 65mmHg  Avoid BP fluctuations  H/H/anemia:  most recent hemoglobin at 8 2 g/dL  maintain hemoglobin greater than 8 grams/deciliter    Acid-base electrolytes:    Electrolytes:      Hyponatremia:  Most recent sodium at 132 mEq  Recheck with labs      Borderline hyperkalemia:  Follow low-potassium diet  Most recent potassium 5 0    Acid-base:    Most recent bicarb at 16 on IV fluids with bicarb awaiting labs from today    Other medical problems:  Proteinuria: Most recent UA positive for glucose and protein trace blood  Diabetes:  Management per primary team   On insulin  Intra-abdominal abscesses/adenocarcinoma of the colon with mets:  Management per primary team   Status post segment 3 liver resection with transverse colectomy and takedown of colostomy followed by diverting loop ileostomy and right hemicolectomy for anastomotic leak  Now status post IR drainage of intra-abdominal abscess and gallbladder  Follow-up with ID on Zosyn  Recent ESBL bacteremia  On Zosyn      Thanks for the consult  Will continue to follow  Please call with questions/ concerns  Above-mentioned orders and Plan in terms acute kidney injury was discussed with the team in [facility] [place] Ofelia Rivas MD, FASGLYNN, 12/10/2022, 11:50 AM          HISTORY OF PRESENT ILLNESS:   Satish Qiu is a 61 y o   male with pmh of diabetes, hypertension, CKD stage III obesity, metastatic colon cancer status post recent surgery with partial colectomy and diverting ileostomy complicated with intra-abdominal abscess who was admitted on 12/4/2022 with abdominal pain from nursing facility  Nephrology has been consulted for evaluation management of acute kidney injury on 12/10/2022  Review of record shows patient has a baseline creatinine of 1 to 1 4 mg/dL  Had acute kidney injury November 2022 with peak creatinine 2 34 mg/dL    Now admitted on 12/4/2022 with a creatinine 1 16 mg/dL  Creatinine was 1 7 mg/dL yesterday  He received contrast on 12/8/2022  Is now status post IR drainage of intra-abdominal abscess  Patient seen and examined in his room earlier this a m  no overt complaints appears lethargic poor historian  History obtained from chart review and the patient    Consults    Review of Systems   Constitutional: Positive for fatigue  Negative for chills  HENT: Negative for congestion  Respiratory: Negative for cough, shortness of breath and wheezing  Cardiovascular: Negative for leg swelling  Gastrointestinal: Negative for abdominal pain and constipation  Musculoskeletal: Negative for back pain  Neurological: Negative for headaches  Psychiatric/Behavioral: Negative for agitation and confusion  All other systems reviewed and are negative  Historical Information   Patient Active Problem List   Diagnosis   • Type 2 diabetes mellitus without complication, with long-term current use of insulin ([facility] [address] )   • Benign essential hypertension   • Mixed hyperlipidemia   • Erectile dysfunction   • Low testosterone   • Obesity (BMI 30-39  9)   • Testicular hypogonadism   • Incarcerated umbilical hernia   • Left ureteral calculus   • Type 2 diabetes mellitus with hyperlipidemia (HCC)   • Colonic mass   • Microcytic anemia   • Hypokalemia   • Transaminitis   • Thrombocytosis   • Iron deficiency anemia, unspecified   • Malignant neoplasm of transverse colon (HCC)   • 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 liver [facility])   • Colon cancer metastasized to liver [facility])   • Colostomy prolapse (HCC)   • Other fatigue   • Cervical radiculopathy   • Encephalopathy   • Flash pulmonary edema (HCC)   • MR (mitral regurgitation)   • Bacteremia   • ESBL (extended spectrum beta-lactamase) producing bacteria infection   • Acute respiratory failure with hypoxia [facility])     Past Medical History:   Diagnosis Date   • Abdominal pain 03/12/2022   • Acute renal failure [facility])     [id number] resolved   • Cancer ([place] [address] )    • Diabetes mellitus ([place] [address] )    • Enteritis 08/23/2016   • Gastroparesis due to DM ([place] [address] ) 08/23/2016   • GERD (gastroesophageal reflux disease)    • Hernia, ventral 08/04/2016   • Hyperlipidemia    • Hypertension    • Morbid obesity ([place] [address] ) 04/17/2018   • Postoperative visit 03/02/2022   • SIRS (systemic inflammatory response syndrome) ([place] [address] ) 03/12/2022   • Snoring    • Stage 3a chronic kidney disease ([place] [address] ) 02/19/2022     Past Surgical History:   Procedure Laterality Date   • COLONOSCOPY     • COLOSTOMY N/A 02/20/2022    Procedure: COLOSTOMY LOOP, diverting;  Surgeon: Henrik Morse MD;  Location: [facility];  Service: General   • ESOPHAGOGASTRODUODENOSCOPY N/A 08/24/2016    Procedure: ESOPHAGOGASTRODUODENOSCOPY (EGD); Surgeon: Clive Lopez MD;  Location: [facility];   Service:    • EXPLORATORY LAPAROTOMY W/ BOWEL RESECTION N/A 11/16/2022    Procedure: LAPAROTOMY EXPLORATORY W/ BOWEL RESECTION- VAC PLACEMENT;  Surgeon: Rishi Mendez MD;  Location: [facility];  Service: Surgical Oncology   • EXPLORATORY LAPAROTOMY W/ BOWEL RESECTION N/A 11/17/2022    Procedure: LAPAROTOMY EXPLORATORY W/ BOWEL RESECTION;  Surgeon: Rishi Mendez MD;  Location: [facility];  Service: Surgical Oncology   • ILEOSTOMY N/A 11/17/2022    Procedure: ILEOSTOMY;  Surgeon: Rishi Mendez MD;  [facility];  Service: Surgical Oncology   • ILEOSTOMY CLOSURE N/A 11/7/2022    Procedure: REVERSAL COLOSTOMY;  Surgeon: Nia Almaraz MD;  [facility];  Service: Surgical Oncology   • IR CHOLECYSTOSTOMY TUBE PLACEMENT  12/8/2022   • IR DRAINAGE TUBE PLACEMENT  12/8/2022   • IR PORT PLACEMENT  03/10/2022   • KIDNEY STONE SURGERY     • LIVER BIOPSY LAPAROSCOPIC N/A 02/20/2022    Procedure: DIAGNOSTIC LAPAROSCOPIC LIVER BIOPSY, DIVERTING LOOP COLOSTOMY ;  Surgeon: Henrik Morse MD;  [facility];  Service: General   • LIVER LOBECTOMY N/A 11/7/2022    Procedure: SEGMENT 3 LIVER RESECTION, ABLATION, INTRAOPERATIVE U/S OF LIVER, PERITONEAL BIOPSY;  Surgeon: Cristina Robbins MD;  [facility]: [facility];  Service: Surgical Oncology   • RIGHT COLON RESECTION N/A 11/7/2022    Procedure: EX LAP, TRANVERSE COLON RESECTION;  Surgeon: Cristina Robbins MD;  [facility];  Service: Surgical Oncology   • TONSILLECTOMY     • UMBILICAL HERNIA REPAIR LAPAROSCOPIC N/A 04/26/2019    Procedure: LAPAROSCOPIC UMBILICAL HERNIA REPAIR;  Surgeon: Yelitza Maldonado MD;  [facility];  Service: General   • VAC DRESSING APPLICATION N/A 89/78/4351    Procedure: APPLICATION VAC DRESSING ABDOMEN/TRUNK;  Surgeon: Priti Brown MD;  [facility];  Service: Surgical Oncology     Social History   Social History     Substance and Sexual Activity   Alcohol Use Never     Social History     Substance and Sexual Activity   Drug Use No     Social History     Tobacco Use   Smoking Status Never   Smokeless Tobacco Never     Family History   Problem Relation Age of Onset   • Diabetes Mother         mellitus   • Lung cancer Mother    • Coronary artery disease Father        Meds/Allergies   current meds:   Current Facility-Administered Medications   Medication Dose Route Frequency   • acetaminophen (TYLENOL) tablet 975 mg  975 mg Oral Q8H [address]   • amLODIPine (NORVASC) tablet 5 mg  5 mg Oral BID   • aspirin chewable tablet 81 mg  81 mg Oral Daily   • atorvastatin (LIPITOR) tablet 40 mg  40 mg Oral Daily   • calcium carbonate (TUMS) chewable tablet 500 mg  500 mg Oral BID PRN   • cholecalciferol (VITAMIN D3) tablet 400 Units  400 Units Oral Daily   • heparin (porcine) subcutaneous injection 5,000 Units  5,000 Units Subcutaneous Q8H [address]   • HYDROmorphone (DILAUDID) injection 0 5 mg  0 5 mg Intravenous Q3H PRN   • insulin glargine (LANTUS) subcutaneous injection 15 Units 0 15 mL  15 Units Subcutaneous HS   • insulin lispro (HumaLOG) 100 units/mL subcutaneous injection 2-12 Units  2-12 Units Subcutaneous TID AC   • insulin lispro (HumaLOG) 100 units/mL subcutaneous injection 4 Units  4 Units Subcutaneous TID With Meals   • lidocaine (PF) (XYLOCAINE-MPF) 1 % injection    PRN   • methocarbamol (ROBAXIN) tablet 500 mg  500 mg Oral BID   • metoprolol (LOPRESSOR) injection 5 mg  5 mg Intravenous Q8H PRN   • multivitamin-minerals (CENTRUM) tablet 1 tablet  1 tablet Oral Daily   • ondansetron (ZOFRAN-ODT) dispersible tablet 4 mg  4 mg Oral Q6H PRN   • oxyCODONE (ROXICODONE) immediate release tablet 10 mg  10 mg Oral Q6H PRN   • oxyCODONE (ROXICODONE) IR tablet 5 mg  5 mg Oral Q6H PRN   • pantoprazole (PROTONIX) EC tablet 40 mg  40 mg Oral BID AC   • piperacillin-tazobactam (ZOSYN) 4 5 g in sodium chloride 0 9 % 100 mL IVPB  4 5 g Intravenous Q6H   • sodium bicarbonate 150 mEq in dextrose 5 % 1,000 mL infusion  100 mL/hr Intravenous Continuous   • tamsulosin (FLOMAX) capsule 0 4 mg  0 4 mg Oral Daily With Dinner    and PTA meds:   Prior to Admission Medications   Prescriptions Last Dose Informant Patient Reported? Taking? Cholecalciferol (VITAMIN D3 PO) 12/5/2022  Yes Yes   Sig: Take 400 Units by mouth daily   Continuous Blood Gluc Sensor (FreeStyle [place] 14 Day Sensor) MISC 12/5/2022  No Yes   Sig: Use 1 each every 14 (fourteen) days   DULoxetine (Cymbalta) 30 mg delayed release capsule   No No   Sig: Take 1 capsule (30 mg total) by mouth daily for 7 days, THEN 2 capsules (60 mg total) daily for 21 days     Insulin Pen Needle (B-D UF III MINI PEN NEEDLES) 31G X 5 MM MISC Unknown  No No   Sig: Inject under the skin daily   Januvia 100 MG tablet Unknown  No No   Sig: TAKE 1 TABLET BY MOUTH EVERY DAY   Lantus SoloStar 100 units/mL SOPN   No No   Sig: INJECT 30 UNITS UNDER THE SKIN DAILY   Multiple Vitamins-Minerals (multivitamin with minerals) tablet 12/5/2022  Yes Yes   Sig: Take 1 tablet by mouth daily   Needle, Disp, (HYPODERMIC NEEDLE) 23G X 1-1/2" MISC Unknown  No No   Sig: by Does not apply route once a week   [employer] MISC Unknown  No No   Sig: Use in the morning   amLODIPine (NORVASC) 5 mg tablet 12/5/2022  No Yes   Sig: TAKE 1 TABLET BY MOUTH TWICE A DAY   aspirin 81 MG tablet 12/5/2022  Yes Yes   Sig: Take 81 mg by mouth daily  atorvastatin (LIPITOR) 40 mg tablet 12/5/2022  No Yes   Sig: TAKE 1 TABLET BY MOUTH EVERY DAY   enoxaparin (LOVENOX) 40 mg/0 4 mL 12/5/2022  No Yes   Sig: Inject 0 4 mL (40 mg total) under the skin every 24 hours for 5 days Do not start before December 4, 2022     glyBURIDE-metFORMIN (GLUCOVANCE) 5-500 MG per tablet Unknown  Yes No   Sig: Take 1 tablet by mouth daily with breakfast Taking 1 tablet in the morning    lisinopril (ZESTRIL) 40 mg tablet 12/5/2022  No Yes   Sig: TAKE 1 TABLET BY MOUTH EVERY DAY   methocarbamol (ROBAXIN) 500 mg tablet 12/5/2022  No Yes   Sig: Take 1 tablet (500 mg total) by mouth 2 (two) times a day   metoclopramide (REGLAN) 10 mg tablet 12/5/2022  No Yes   Sig: TAKE 1 TABLET BY MOUTH TWICE A DAY   ondansetron (ZOFRAN) 4 mg tablet Unknown  Yes No   Sig: Take 4 mg by mouth every 6 (six) hours as needed   pantoprazole (PROTONIX) 40 mg tablet 12/5/2022  No Yes   Sig: TAKE 1 TABLET BY MOUTH TWICE A DAY      Facility-Administered Medications: None       Scheduled Meds:  Current Facility-Administered Medications   Medication Dose Route Frequency Provider Last Rate   • acetaminophen  975 mg Oral [facility] Divya Romero MD     • amLODIPine  5 mg Oral BID Divya Romero MD     • aspirin  81 mg Oral Daily Divya Romero MD     • atorvastatin  40 mg Oral Daily Divya Romero MD     • calcium carbonate  500 mg Oral BID PRN Odin Moise MD     • cholecalciferol  400 Units Oral Daily Divya Romero MD     • heparin (porcine)  5,000 Units Subcutaneous Q8H [address] Maggy Villatoro PA-C     • HYDROmorphone  0 5 mg Intravenous Q3H PRN Odin Moise MD     • insulin glargine  15 Units Subcutaneous HS Divya Romero MD     • insulin lispro  2-12 Units Subcutaneous TID AC Celia Augustine MD     • insulin lispro  4 Units Subcutaneous TID With Meals Matthew Choudhary MD     • lidocaine (PF)    PRN Meme Zhong MD     • methocarbamol  500 mg Oral BID Willian Hernandez MD     • metoprolol  5 mg Intravenous Q8H PRN Maggy Villatoro PA-C     • multivitamin-minerals  1 tablet Oral Daily Willian Hernandez MD     • ondansetron  4 mg Oral Q6H PRN Willian Hernandez MD     • oxyCODONE  10 mg Oral Q6H PRN Wililan Hernandez MD     • oxyCODONE  5 mg Oral Q6H PRN Willian Hernandez MD     • pantoprazole  40 mg Oral BID AC Willian Hernandez MD     • piperacillin-tazobactam  4 5 g Intravenous Elisa Mcintyre MD 4 5 g (12/10/22 0434)   • sodium bicarbonate infusion  100 mL/hr Intravenous Continuous Kinsey Stewart  mL/hr (12/10/22 0802)   • tamsulosin  0 4 mg Oral Daily With Guero Rubio MD         PRN Meds:  •  calcium carbonate  •  HYDROmorphone  •  lidocaine (PF)  •  metoprolol  •  ondansetron  •  oxyCODONE  •  oxyCODONE    Continuous Infusions:sodium bicarbonate infusion, 100 mL/hr, Last Rate: 100 mL/hr (12/10/22 0802)        Allergies   Allergen Reactions   • Shellfish-Derived Products - Food Allergy Anaphylaxis   • Erythromycin GI Intolerance         Invasive Devices: Invasive Devices     Central Venous Catheter Line  Duration           Port A Cath 03/10/22 Right Chest 275 days          Peripheral Intravenous Line  Duration           Peripheral IV 22 Dorsal (posterior); Right Hand 2 days          Drain  Duration           Ileostomy LUQ 22 days    Abscess Drain Abdomen 1 day    Cholecystostomy Tube 1 day                  PHYSICAL EXAM  /74   Pulse 79   Temp (!) 97 °F (36 1 °C)   Resp 16   Ht 5' 7 99" (1 727 m)   Wt 109 kg (239 lb 13 8 oz)   SpO2 97%   BMI 36 48 kg/m²   Temp (24hrs), Av 3 °F (36 3 °C), Min:97 °F (36 1 °C), Max:97 7 °F (36 5 °C)        Intake/Output Summary (Last 24 hours) at 12/10/2022 1150  Last data filed at 12/10/2022 0802  Gross per 24 hour   Intake 2560 ml   Output 1915 ml   Net 645 ml       I/O last 24 hours: In: 2560 [P O :120; I V :2430; NG/GT:10]  Out: 2150 [Urine:850; Emesis/NG output:225; Drains:50; Stool:1025]          Current Weight: Weight - Scale: 109 kg (239 lb 13 8 oz)  First Weight: Weight - Scale: 103 kg (226 lb 3 1 oz)  Physical Exam  Vitals and nursing note reviewed  Constitutional:       General: He is not in acute distress  Appearance: Normal appearance  He is obese  He is ill-appearing  He is not toxic-appearing or diaphoretic  HENT:      Head: Normocephalic and atraumatic  Mouth/Throat:      Mouth: Mucous membranes are dry  Pharynx: Oropharynx is clear  No oropharyngeal exudate  Eyes:      General: No scleral icterus  Conjunctiva/sclera: Conjunctivae normal    Cardiovascular:      Rate and Rhythm: Normal rate  Heart sounds: No friction rub  Pulmonary:      Effort: Pulmonary effort is normal  No respiratory distress  Breath sounds: Normal breath sounds  Abdominal:      General: There is no distension  Palpations: Abdomen is soft  Tenderness: There is no abdominal tenderness  Comments: Ileostomy, cholecystectomy drain   Musculoskeletal:         General: No swelling  Cervical back: Normal range of motion and neck supple  No rigidity  Skin:     General: Skin is warm and dry  Coloration: Skin is not jaundiced  Neurological:      General: No focal deficit present  Mental Status: He is alert        Comments: Tired   Psychiatric:         Mood and Affect: Mood normal          Behavior: Behavior normal            LABORATORY:    Results from last 7 days   Lab Units 12/09/22  1635 12/09/22  0541 12/08/22  0440 12/06/22  1018 12/06/22  0037 12/05/22  0925 12/04/22  0542 12/04/22  0523   WBC Thousand/uL  --  21 51* 28 29*  --  10 53* 12 98*  --  17 51*   HEMOGLOBIN g/dL  --  8 2* 9 2*  --  10 5* 8 3*  --  8 3*   HEMATOCRIT % --  25 9* 29 2*  --  33 6* 27 7*  --  26 8*   PLATELETS Thousands/uL  --  416* 416*  --  361 415*  --  428*   POTASSIUM mmol/L 5 0 4 4  --  4 1 3 8 3 8 4 5  --    CHLORIDE mmol/L 109* 106  --  110* 110* 112* 112*  --    CO2 mmol/L 16* 19*  --  18* 20* 20* 19*  --    BUN mg/dL 25 22  --  18 18 19 17  --    CREATININE mg/dL 1 71* 1 46*  --  1 25 1 33* 1 23 1 16  --    CALCIUM mg/dL 7 9* 8 0*  --  8 5 8 2* 8 5 8 1*  --    MAGNESIUM mg/dL  --  1 8  --   --   --   --   --   --    PHOSPHORUS mg/dL  --  4 6*  --  3 5  --   --   --   --       rest all reviewed    RADIOLOGY:  IR drainage tube placement   Final Result by Rylan Henao MD (12/09 2559)   Ultrasound-guided abscess drainage catheter placement  Workstation performed: [id number]         IR cholecystostomy tube placement   Final Result by Rylan Henao MD (12/09 0658)   Percutaneous cholecystostomy  Workstation performed: [id number]         US right upper quadrant   Final Result by Roberta Mark MD (12/08 9417)      Gallbladder wall thickening possibly reactive to the adjacent hepatic disease  Gallbladder pericholecystic fluid is nonspecific in the setting of abdominal ascites  Gallbladder distention is also nonspecific if the patient has not eaten  Negative sonographic Campbell's sign  Workstation performed: [id number]         CT chest abdomen pelvis w contrast   Final Result by Gela Park MD (12/08 2904)      New findings of small left-sided extraluminal paracolic gas with small associated complex fluid which in close proximity to the colonic suture line; postoperative leak not excluded  Mild rim-enhancing left abdominal focal gas and fluid collection in    close proximity measuring 4 4 x 2 4 cm suggestive of developing abscess  Smaller 2 7 x 2 0 cm gas and fluid-containing rim-enhancing fluid collection in the left anterior abdomen (image 66, series 2) also suggestive of abscess        New significant gallbladder distention now measuring up to 13 x 7 cm with associated gallbladder wall thickening/edema  New focal fluid collection contiguous with the gallbladder fossa measuring 6 8 x 5 2 cm  Mildly enlarged fluid collection adjacent to the segment 3 hepatectomy site now measuring 8 1 x 7 1 x 8 0 cm, previously 6 8 x 6 6 x 8 2 cm  Volume of air within the collection is also increased  New small air locules within the midline abdominal wound which appear to track to the anterior surface of bowel worrisome for enterocutaneous fistula  Small volume abdominopelvic ascites and diffuse mesenteric edema, similar to prior  Bilateral lower lobe pneumonia  Small bilateral pleural effusions  The study was marked in [facility] for immediate notification  Workstation performed: [id number]         XR chest portable   Final Result by Ezequiel Vance MD (12/07 6743)   Mild to moderate left effusion  Left lung base parenchymal process cannot be excluded  Workstation performed: [id number]         IR cholecystostomy tube check/change/reposition/reinsertion/upsize    (Results Pending)   IR drainage tube check/change/reposition/reinsertion/upsize    (Results Pending)     Rest all reviewed    Portions of the record may have been created with voice recognition software  Occasional wrong word or "sound a like" substitutions may have occurred due to the inherent limitations of voice recognition software  Read the chart carefully and recognize, using context, where substitutions have occurred  If you have any questions, please contact the dictating provider

## 2022-12-10 NOTE — PLAN OF CARE
Problem: PAIN - ADULT  Goal: Verbalizes/displays adequate comfort level or baseline comfort level  Description: Interventions:  - Encourage patient to monitor pain and request assistance  - Assess pain using appropriate pain scale  - Administer analgesics based on type and severity of pain and evaluate response  - Implement non-pharmacological measures as appropriate and evaluate response  - Consider cultural and social influences on pain and pain management  - Notify physician/advanced practitioner if interventions unsuccessful or patient reports new pain  Outcome: Progressing     Problem: INFECTION - ADULT  Goal: Absence or prevention of progression during hospitalization  Description: INTERVENTIONS:  - Assess and monitor for signs and symptoms of infection  - Monitor lab/diagnostic results  - Monitor all insertion sites, i e  indwelling lines, tubes, and drains  - Monitor endotracheal if appropriate and nasal secretions for changes in amount and color  - [place] appropriate cooling/warming therapies per order  - Administer medications as ordered  - Instruct and encourage patient and family to use good hand hygiene technique  - Identify and instruct in appropriate isolation precautions for identified infection/condition  Outcome: Progressing

## 2022-12-10 NOTE — QUICK NOTE
Consulted for nonoliguric ALEXY  This is a 69-year-old male with history of diabetes mellitus, obesity, chronic kidney disease, gastroparesis and recently diagnosed colon cancer with metastatic disease to liver status post exploratory lap laparotomy, transverse colon resection, resection of liver x3, peritoneal biopsy, reversal of colostomy and takedown of splenic flexure who is being treated for recurrent intra-abdominal abscesses      # Chronic kidney disease  - Baseline creatinine around 1 5-1 6 since 2016  - Etiology: Diabetic kidney disease in setting of albuminuria, tensive kidney disease, obesity related hyperfiltration  - Does not follow with nephrology    # Acute kidney injury  - Baseline creatinine around 1 5-1 6 since 2016   - Recent surgical admission in 11/2022 (partial colectomy with diverting ileostomy complicated with intra-abdominal abscess) with ALEXY with peak creatinine of 3 2 and discharge creatinine of 1 13  - This admission, admission creatinine 1 16 with most recent creatinine 1 71  - Renal imaging with no hydronephrosis  - Renal insults:   · Ongoing emesis/diarrheal output leading to a prerenal state  · Ongoing recurrent intra-abdominal infection leading to tubular injury  · Hemodynamic effects of RAAS blockade  · Administration of iodinated contrast 12/08/2022, 11/21/2022, 11/13/2022, 11/09/2022 leading to acumulative tubular injury     # Hyperchloremic non-anion gap metabolic acidosis  - Most likely etiology due to ongoing diarrhea +/- normal saline use sliding    # Mild hyponatremia  - Translocational hyponatremia in setting of hyperglycemia +/- additional component of hypotonic hyponatremia due to acute kidney injury 9    >> Plan  - Check urinalysis with microscopy   - Check urine sodium and urine chloride  - Check bladder scan to rule out urinary retention  - Hold lisinopril  - Discontinue normal saline  - Start sodium bicarbonate drip (D5W with 150 mEq of sodium bicarbonate) at 100 cc/h  - If hyperglycemia worsens, switch to half-normal saline with 75 mEq of sodium bicarbonate  - Monitor serum calcium while on sodium bicarbonate drip (corrected calcium currently acceptable)  - No indication for renal replacement therapy  - Full consult to follow in a m

## 2022-12-11 LAB
2HR DELTA HS TROPONIN: -3 NG/L
ABO GROUP BLD: NORMAL
ALBUMIN SERPL BCP-MCNC: 1.4 G/DL (ref 3.5–5)
ALP SERPL-CCNC: 994 U/L (ref 46–116)
ALT SERPL W P-5'-P-CCNC: 32 U/L (ref 12–78)
ANION GAP SERPL CALCULATED.3IONS-SCNC: 10 MMOL/L (ref 4–13)
AST SERPL W P-5'-P-CCNC: 96 U/L (ref 5–45)
BACTERIA SPEC ANAEROBE CULT: NO GROWTH
BACTERIA SPEC ANAEROBE CULT: NORMAL
BACTERIA SPEC BFLD CULT: ABNORMAL
BACTERIA SPEC BFLD CULT: ABNORMAL
BILIRUB SERPL-MCNC: 1.84 MG/DL (ref 0.2–1)
BLD GP AB SCN SERPL QL: NEGATIVE
BUN SERPL-MCNC: 26 MG/DL (ref 5–25)
CALCIUM ALBUM COR SERPL-MCNC: 10.1 MG/DL (ref 8.3–10.1)
CALCIUM SERPL-MCNC: 8 MG/DL (ref 8.3–10.1)
CARDIAC TROPONIN I PNL SERPL HS: 69 NG/L
CARDIAC TROPONIN I PNL SERPL HS: 72 NG/L
CHLORIDE SERPL-SCNC: 106 MMOL/L (ref 96–108)
CO2 SERPL-SCNC: 21 MMOL/L (ref 21–32)
CREAT SERPL-MCNC: 2.1 MG/DL (ref 0.6–1.3)
ERYTHROCYTE [DISTWIDTH] IN BLOOD BY AUTOMATED COUNT: 16 % (ref 11.6–15.1)
ERYTHROCYTE [DISTWIDTH] IN BLOOD BY AUTOMATED COUNT: 16.3 % (ref 11.6–15.1)
GFR SERPL CREATININE-BSD FRML MDRD: 33 ML/MIN/1.73SQ M
GLUCOSE SERPL-MCNC: 159 MG/DL (ref 65–140)
GLUCOSE SERPL-MCNC: 163 MG/DL (ref 65–140)
GLUCOSE SERPL-MCNC: 165 MG/DL (ref 65–140)
GLUCOSE SERPL-MCNC: 170 MG/DL (ref 65–140)
GRAM STN SPEC: ABNORMAL
GRAM STN SPEC: ABNORMAL
HCT VFR BLD AUTO: 20.3 % (ref 36.5–49.3)
HCT VFR BLD AUTO: 20.9 % (ref 36.5–49.3)
HCT VFR BLD AUTO: 23.6 % (ref 36.5–49.3)
HGB BLD-MCNC: 6.4 G/DL (ref 12–17)
HGB BLD-MCNC: 6.6 G/DL (ref 12–17)
HGB BLD-MCNC: 7.4 G/DL (ref 12–17)
MAGNESIUM SERPL-MCNC: 1.9 MG/DL (ref 1.6–2.6)
MCH RBC QN AUTO: 26.6 PG (ref 26.8–34.3)
MCH RBC QN AUTO: 27.3 PG (ref 26.8–34.3)
MCHC RBC AUTO-ENTMCNC: 30.5 G/DL (ref 31.4–37.4)
MCHC RBC AUTO-ENTMCNC: 31.6 G/DL (ref 31.4–37.4)
MCV RBC AUTO: 86 FL (ref 82–98)
MCV RBC AUTO: 87 FL (ref 82–98)
PHOSPHATE SERPL-MCNC: 3.9 MG/DL (ref 2.7–4.5)
PLATELET # BLD AUTO: 296 THOUSANDS/UL (ref 149–390)
PLATELET # BLD AUTO: 297 THOUSANDS/UL (ref 149–390)
PMV BLD AUTO: 10.3 FL (ref 8.9–12.7)
PMV BLD AUTO: 9.5 FL (ref 8.9–12.7)
POTASSIUM SERPL-SCNC: 3.6 MMOL/L (ref 3.5–5.3)
PROT SERPL-MCNC: 6.6 G/DL (ref 6.4–8.4)
RBC # BLD AUTO: 2.33 MILLION/UL (ref 3.88–5.62)
RBC # BLD AUTO: 2.42 MILLION/UL (ref 3.88–5.62)
RH BLD: POSITIVE
SODIUM SERPL-SCNC: 137 MMOL/L (ref 135–147)
SPECIMEN EXPIRATION DATE: NORMAL
WBC # BLD AUTO: 12.67 THOUSAND/UL (ref 4.31–10.16)
WBC # BLD AUTO: 13.09 THOUSAND/UL (ref 4.31–10.16)

## 2022-12-11 RX ORDER — ALBUMIN (HUMAN) 12.5 G/50ML
25 SOLUTION INTRAVENOUS EVERY 6 HOURS
Status: COMPLETED | OUTPATIENT
Start: 2022-12-11 | End: 2022-12-12

## 2022-12-11 RX ORDER — INSULIN LISPRO 100 [IU]/ML
1-5 INJECTION, SOLUTION INTRAVENOUS; SUBCUTANEOUS
Status: DISCONTINUED | OUTPATIENT
Start: 2022-12-11 | End: 2022-12-14 | Stop reason: HOSPADM

## 2022-12-11 RX ORDER — SODIUM BICARBONATE 650 MG/1
650 TABLET ORAL
Status: DISCONTINUED | OUTPATIENT
Start: 2022-12-11 | End: 2022-12-12

## 2022-12-11 RX ORDER — INSULIN LISPRO 100 [IU]/ML
1-6 INJECTION, SOLUTION INTRAVENOUS; SUBCUTANEOUS
Status: DISCONTINUED | OUTPATIENT
Start: 2022-12-11 | End: 2022-12-14 | Stop reason: HOSPADM

## 2022-12-11 RX ORDER — INSULIN LISPRO 100 [IU]/ML
6 INJECTION, SOLUTION INTRAVENOUS; SUBCUTANEOUS
Status: DISCONTINUED | OUTPATIENT
Start: 2022-12-11 | End: 2022-12-14

## 2022-12-11 RX ORDER — ALBUMIN (HUMAN) 12.5 G/50ML
25 SOLUTION INTRAVENOUS EVERY 6 HOURS
Status: DISCONTINUED | OUTPATIENT
Start: 2022-12-11 | End: 2022-12-11

## 2022-12-11 RX ADMIN — INSULIN LISPRO 6 UNITS: 100 INJECTION, SOLUTION INTRAVENOUS; SUBCUTANEOUS at 18:45

## 2022-12-11 RX ADMIN — PANTOPRAZOLE SODIUM 40 MG: 40 TABLET, DELAYED RELEASE ORAL at 15:13

## 2022-12-11 RX ADMIN — METHOCARBAMOL TABLETS 500 MG: 500 TABLET, COATED ORAL at 18:59

## 2022-12-11 RX ADMIN — INSULIN LISPRO 1 UNITS: 100 INJECTION, SOLUTION INTRAVENOUS; SUBCUTANEOUS at 18:45

## 2022-12-11 RX ADMIN — HEPARIN SODIUM 5000 UNITS: 5000 INJECTION INTRAVENOUS; SUBCUTANEOUS at 15:16

## 2022-12-11 RX ADMIN — ACETAMINOPHEN 975 MG: 325 TABLET ORAL at 22:27

## 2022-12-11 RX ADMIN — HEPARIN SODIUM 5000 UNITS: 5000 INJECTION INTRAVENOUS; SUBCUTANEOUS at 05:42

## 2022-12-11 RX ADMIN — ALBUMIN (HUMAN) 25 G: 0.25 INJECTION, SOLUTION INTRAVENOUS at 20:15

## 2022-12-11 RX ADMIN — AMLODIPINE BESYLATE 5 MG: 5 TABLET ORAL at 11:49

## 2022-12-11 RX ADMIN — METHOCARBAMOL TABLETS 500 MG: 500 TABLET, COATED ORAL at 11:38

## 2022-12-11 RX ADMIN — INSULIN GLARGINE 15 UNITS: 100 INJECTION, SOLUTION SUBCUTANEOUS at 22:30

## 2022-12-11 RX ADMIN — HEPARIN SODIUM 5000 UNITS: 5000 INJECTION INTRAVENOUS; SUBCUTANEOUS at 22:27

## 2022-12-11 RX ADMIN — CALCIUM CARBONATE (ANTACID) CHEW TAB 500 MG 500 MG: 500 CHEW TAB at 22:26

## 2022-12-11 RX ADMIN — ATORVASTATIN CALCIUM 40 MG: 40 TABLET, FILM COATED ORAL at 11:39

## 2022-12-11 RX ADMIN — SODIUM BICARBONATE 650 MG TABLET 650 MG: at 18:59

## 2022-12-11 RX ADMIN — SODIUM BICARBONATE 100 ML/HR: 84 INJECTION, SOLUTION INTRAVENOUS at 07:55

## 2022-12-11 RX ADMIN — AMLODIPINE BESYLATE 5 MG: 5 TABLET ORAL at 18:59

## 2022-12-11 RX ADMIN — ACETAMINOPHEN 975 MG: 325 TABLET ORAL at 05:42

## 2022-12-11 RX ADMIN — ALBUMIN (HUMAN) 25 G: 0.25 INJECTION, SOLUTION INTRAVENOUS at 12:24

## 2022-12-11 RX ADMIN — PIPERACILLIN AND TAZOBACTAM 4.5 G: 36; 4.5 INJECTION, POWDER, FOR SOLUTION INTRAVENOUS at 18:52

## 2022-12-11 RX ADMIN — INSULIN LISPRO 4 UNITS: 100 INJECTION, SOLUTION INTRAVENOUS; SUBCUTANEOUS at 11:42

## 2022-12-11 RX ADMIN — OXYCODONE HYDROCHLORIDE 10 MG: 10 TABLET ORAL at 22:27

## 2022-12-11 RX ADMIN — PIPERACILLIN AND TAZOBACTAM 4.5 G: 36; 4.5 INJECTION, POWDER, FOR SOLUTION INTRAVENOUS at 05:42

## 2022-12-11 RX ADMIN — SODIUM BICARBONATE 650 MG TABLET 650 MG: at 11:38

## 2022-12-11 RX ADMIN — PANTOPRAZOLE SODIUM 40 MG: 40 TABLET, DELAYED RELEASE ORAL at 11:38

## 2022-12-11 RX ADMIN — TAMSULOSIN HYDROCHLORIDE 0.4 MG: 0.4 CAPSULE ORAL at 18:59

## 2022-12-11 RX ADMIN — INSULIN LISPRO 1 UNITS: 100 INJECTION, SOLUTION INTRAVENOUS; SUBCUTANEOUS at 22:30

## 2022-12-11 RX ADMIN — Medication 1 TABLET: at 11:38

## 2022-12-11 RX ADMIN — PIPERACILLIN AND TAZOBACTAM 4.5 G: 36; 4.5 INJECTION, POWDER, FOR SOLUTION INTRAVENOUS at 11:22

## 2022-12-11 RX ADMIN — ASPIRIN 81 MG CHEWABLE TABLET 81 MG: 81 TABLET CHEWABLE at 11:38

## 2022-12-11 RX ADMIN — PIPERACILLIN AND TAZOBACTAM 4.5 G: 36; 4.5 INJECTION, POWDER, FOR SOLUTION INTRAVENOUS at 22:27

## 2022-12-11 RX ADMIN — ALBUMIN (HUMAN) 25 G: 0.25 INJECTION, SOLUTION INTRAVENOUS at 22:27

## 2022-12-11 RX ADMIN — ACETAMINOPHEN 975 MG: 325 TABLET ORAL at 15:13

## 2022-12-11 RX ADMIN — INSULIN LISPRO 2 UNITS: 100 INJECTION, SOLUTION INTRAVENOUS; SUBCUTANEOUS at 11:41

## 2022-12-11 RX ADMIN — CHOLECALCIFEROL TAB 10 MCG (400 UNIT) 400 UNITS: 10 TAB at 11:38

## 2022-12-11 NOTE — PROGRESS NOTES
Progress Note - Neeraj Hair 61 y o  male MRN: [id number]    Unit/Bed#: [facility] 906-01 Encounter: [id number]      CC: diabetes f/u    Subjective:   Neeraj Hair is a 61y o  year old male with type 2 diabetes admitted status post transverse colon resection     Feels well  No complaints  No hypoglycemia  Objective:     Vitals: Blood pressure 139/79, pulse 76, temperature (!) 96 8 °F (36 °C), resp  rate 17, height 5' 7 99" (1 727 m), weight 109 kg (239 lb 13 8 oz), SpO2 97 %  ,Body mass index is 36 48 kg/m²  Intake/Output Summary (Last 24 hours) at 12/11/2022 1213  Last data filed at 12/11/2022 0600  Gross per 24 hour   Intake 580 ml   Output 1665 ml   Net -1085 ml       Physical Exam:  General Appearance: awake, appears stated age and cooperative  Head: Normocephalic, without obvious abnormality, atraumatic  Extremities: moves all extremities  Skin: Skin color and temperature normal    Pulm: no labored breathing    Lab, Imaging and other studies: I have personally reviewed pertinent reports  POC Glucose (mg/dl)   Date Value   12/11/2022 170 (H)   12/10/2022 183 (H)   12/10/2022 219 (H)   12/10/2022 213 (H)   12/10/2022 169 (H)   12/09/2022 181 (H)   12/09/2022 194 (H)   12/09/2022 172 (H)   12/09/2022 136   12/08/2022 159 (H)       Assessment and plan:  #Type 2 diabetes  Hemoglobin A1c 8 0 09/2022  Home regimen:  Januvia 100 mg, metformin-glyburide 5-500 mg daily, jorge Galloway  Inpatient regimen: Lantus 15 units daily at bedtime, Humalog 4 units 3 times daily with meals with correctional insulin    Recommendations: increase Premeal insulin to 6 units 3 times daily with meals,  continue current correctional insulin and Lantus 15 units daily at bedtime  Portions of the record may have been created with voice recognition software

## 2022-12-11 NOTE — PLAN OF CARE
Problem: PAIN - ADULT  Goal: Verbalizes/displays adequate comfort level or baseline comfort level  Description: Interventions:  - Encourage patient to monitor pain and request assistance  - Assess pain using appropriate pain scale  - Administer analgesics based on type and severity of pain and evaluate response  - Implement non-pharmacological measures as appropriate and evaluate response  - Consider cultural and social influences on pain and pain management  - Notify physician/advanced practitioner if interventions unsuccessful or patient reports new pain  Outcome: Progressing     Problem: SKIN/TISSUE INTEGRITY - ADULT  Goal: Skin Integrity remains intact(Skin Breakdown Prevention)  Description: Assess:  -Assess extremities for adequate circulation and sensation     Bed Management:  -Have minimal linens on bed & keep smooth, unwrinkled  -Change linens as needed when moist or perspiring  -Avoid sitting or lying in one position for more than 2 hours while in bed  -Keep HOB at 30 degrees     Toileting:  -Offer bedside commode    -Use incontinent care products after each incontinent episode     Activity:  -Encourage activity and walks on unit  -Encourage or provide ROM exercises   -Use appropriate equipment to lift or move patient in bed  -Instruct/ Assist with weight shifting every 2 hr when out of bed in chair    Skin Care:  -Avoid use of baby powder, tape, friction and shearing, hot water or constrictive clothing  -Relieve pressure over bony prominences   -Do not massage red bony areas    Outcome: Progressing  Goal: Incision(s), wounds(s) or drain site(s) healing without S/S of infection  Description: INTERVENTIONS  - Assess and document dressing, incision, wound bed, drain sites and surrounding tissue  - Provide patient and family education  Outcome: Progressing  Goal: Pressure injury heals and does not worsen  Description: Interventions:  - Implement low air loss mattress or specialty surface (Criteria met)  - Apply silicone foam dressing  - Use special pressure reducing interventions   - Apply fecal or urinary incontinence containment device   - Turn and reposition patient & offload bony prominences every 2 hours   - Utilize friction reducing device or surface for transfers   - Consider nutrition services referral as needed  Outcome: Progressing

## 2022-12-11 NOTE — PROGRESS NOTES
Progress Note - Surgical Oncology   Sandy Sepulveda 61 y o  male MRN: [id number]  Unit/Bed#: [facility] 906-01 Encounter: [id number]    Assessment:  60yoM s/p segment 3 liver resection with transverse colectomy and takedown of colostomy followed by diverting loop ileostomy and right hemicolectomy for anastomotic leak   Now status post IR drainage of intra-abdominal abscess and gallbladder    Vital stable, afebrile, 2L NC  WBC 12 67 from 21 51  Hemoglobin 6 4 this morning  Platelets 569  Creatinine 2 10 from 1 94    UOP 1250  Percutaneous cholecystostomy tube 20 red-tinged bilious  Left-sided abscess drained 45 purulent appearing  Ileostomy 350    Plan:  -CCD2 diet  -Will repeat CBC given low hemoglobin, possibly dilutional, also send type and screen  -Nephrology on board for rising creatinine, ALEXY in setting of known CKD, receiving bicarb as well as albumin yesterday, appreciate recommendations, continue to trend creatinine  -On Zosyn  -Abscess drain, monitor output and character  -Percutaneous cholecystomy tube, monitor output and character  -Ileostomy, monitor output and character  -Local wound care to midline wound  -SQH  -Encourage ambulation  -Incentive spirometry  -Pain control    Subjective/Objective   Subjective:  Patient reports continued abdominal pain, tolerating diet but not eating all of his meals, does have some nausea when he eats too much without any episodes of emesis, ileostomy with function, voiding without issues, out of bed to chair, using incentive spirometry  Objective:     Blood pressure 135/74, pulse 89, temperature (!) 97 2 °F (36 2 °C), resp  rate 20, height 5' 7 99" (1 727 m), weight 109 kg (239 lb 13 8 oz), SpO2 95 %  ,Body mass index is 36 48 kg/m²        Intake/Output Summary (Last 24 hours) at 12/11/2022 0908  Last data filed at 12/11/2022 0600  Gross per 24 hour   Intake 580 ml   Output 1665 ml   Net -1085 ml       Invasive Devices     Central Venous Catheter Line  Duration Port A Cath 03/10/22 Right Chest 275 days          Peripheral Intravenous Line  Duration           Peripheral IV 12/08/22 Dorsal (posterior); Right Hand 3 days          Drain  Duration           Ileostomy LUQ 23 days    Abscess Drain Abdomen 2 days    Cholecystostomy Tube 2 days                Physical Exam:   General: NAD  Skin: Warm, dry, anicteric  HEENT: Normocephalic, atraumatic  CV: RRR, no m/r/g  Pulm: CTA b/l, no inc WOB  Abd: Soft, ND/NT, percutaneous cholecystostomy tube, IR drain, ileostomy, midline incision/wound with packing present  MSK: Symmetric, no edema, no tenderness, no deformity  Neuro: AOx3, GCS 15    Lab, Imaging and other studies:  I have personally reviewed pertinent lab results    , CBC:   Lab Results   Component Value Date    WBC 12 67 (H) 12/11/2022    HGB 6 4 (LL) 12/11/2022    HCT 20 3 (L) 12/11/2022    MCV 87 12/11/2022     12/11/2022    MCH 26 6 (L) 12/11/2022    MCHC 30 5 (L) 12/11/2022    RDW 16 3 (H) 12/11/2022    MPV 10 3 12/11/2022   , CMP:   Lab Results   Component Value Date    SODIUM 137 12/11/2022    K 3 6 12/11/2022     12/11/2022    CO2 21 12/11/2022    BUN 26 (H) 12/11/2022    CREATININE 2 10 (H) 12/11/2022    CALCIUM 8 0 (L) 12/11/2022    AST 96 (H) 12/11/2022    ALT 32 12/11/2022    ALKPHOS 994 (H) 12/11/2022    EGFR 33 12/11/2022     VTE Pharmacologic Prophylaxis: Sequential compression device (Venodyne)  and Heparin  VTE Mechanical Prophylaxis: sequential compression device

## 2022-12-11 NOTE — PROGRESS NOTES
Follow up Consultation    Nephrology   Bevely Guardian 61 y o  male MRN: [id number]  Unit/Bed#: [place] 906-01 Encounter: [id number]      Physician Requesting Consult: Sofía Forman MD        ASSESSMENT/PLAN:  61 y o   male with pmh of diabetes, hypertension,  obesity, metastatic colon cancer status post recent surgery with partial colectomy and diverting ileostomy complicated with intra-abdominal abscess who was admitted on 12/4/2022 with abdominal pain from nursing facility  Nephrology has been consulted for evaluation management of acute kidney injury on 12/10/2022      Acute kidney injury :  ALEXY multifactorial most likely secondary to prerenal azotemia plus some component of infection plus RANDY (12/8) plus failure to autoregulate presence of ACE inhibitor plus increase susceptibility acute kidney injury due to recent episode of acute kidney injury and subsequent ATN  After review of records In [facility] as well as Care everywhere it appears that the patient has a baseline Creatinine of 1 1-1 6 mg/dL  patient was admitted with a creatinine of 1 16 mg/dL on 12/4/2022  Most recent labs today the creatinine is at 2 10 mg/dL, slightly elevated from yesterday  patient's creatinine today is still pending we discussed with nurse with regards to redrawing right now stat  Hold further IV fluids for now  Start patient on albumin 25 g IV every 6 to help with intravascular volume shifts  Cynically appears to be euvolemic to hypovolemic minimally  check BMP in a m  Await renal recovery  Optimize hemodynamic status to avoid delay in renal recovery  Avoid nephrotoxins, adjust meds to appropriate GFR  Strict I/O  Daily weights  Urinary retention protocol if patient does not have a Negro  will need to set up patient for follow up with Nephrology as an outpatient post hospitalization  as an outpatient for nephrology, patient follows up with nephrologist     Blood pressure/hypertension:  current medications: Norvasc 5 mg p o  twice daily  recommendations: Hold lisinopril for now, adjusted hold parameters  Placed on albumin 25 g IV every 6 to help with intravascular volume shifts  Optimize hemodynamics  Maintain MAP > 65mmHg  Avoid BP fluctuations      H/H/anemia:  most recent hemoglobin at  6 4 g/dL  maintain hemoglobin greater than 8 grams/deciliter  Recommend recheck and consider PRBC transfusion management per primary team     Acid-base electrolytes:     Electrolytes:       Hyponatremia:  Most recent sodium a 137 mEq  Resolved        Borderline hyperkalemia:  Follow low-potassium diet  Most recent potassium 3 6  Resolved     Acid-base:    Most recent bicarb at 21  Hold further IV fluids with bicarb started on p o  bicarb 650 mg twice daily     Other medical problems:  Proteinuria: Most recent UA positive for glucose and protein trace blood  Diabetes:  Management per primary team   On insulin  Intra-abdominal abscesses/adenocarcinoma of the colon with mets:  Management per primary team   Status post segment 3 liver resection with transverse colectomy and takedown of colostomy followed by diverting loop ileostomy and right hemicolectomy for anastomotic leak  Now status post IR drainage of intra-abdominal abscess and gallbladder  Follow-up with ID on Zosyn  Recent ESBL bacteremia  On Zosyn    Thanks for the consult  Will continue to follow  Please call with questions/ concerns  Above-mentioned orders and Plan in terms of acute kidney injury was discussed with the team in depth with [facility] text    Vandana Kim MD, FASN, 12/11/2022, 6:19 AM              Objective :   Patient seen and examined in his room overnight events hemoglobin stable remains afebrile slightly more alert and conversational today  Was finishing up IV fluids  Urine output 1 3 L last 24 hours stool output 350 cc        PHYSICAL EXAM  /74   Pulse 89   Temp (!) 97 2 °F (36 2 °C)   Resp 20   Ht 5' 7 99" (1 727 m)   Wt 109 kg (239 lb 13 8 oz)   SpO2 95% BMI 36 48 kg/m²   Temp (24hrs), Av 1 °F (36 2 °C), Min:97 °F (36 1 °C), Max:97 2 °F (36 2 °C)        Intake/Output Summary (Last 24 hours) at 2022 0619  Last data filed at 2022 0600  Gross per 24 hour   Intake 1690 ml   Output 1665 ml   Net 25 ml       I/O last 24 hours: In: 3 3 [P O :600; I V :1363 3; NG/GT:10; IV Piggyback:100]  Out: 2622 [Urine:1675; Emesis/NG output:120; Drains:85; Stool:675]      Current Weight: Weight - Scale: 109 kg (239 lb 13 8 oz)  First Weight: Weight - Scale: 103 kg (226 lb 3 1 oz)  Physical Exam  Vitals and nursing note reviewed  Constitutional:       General: He is not in acute distress  Appearance: Normal appearance  He is obese  He is ill-appearing  He is not toxic-appearing or diaphoretic  HENT:      Head: Normocephalic and atraumatic  Mouth/Throat:      Mouth: Mucous membranes are moist       Pharynx: No oropharyngeal exudate  Eyes:      General: No scleral icterus  Conjunctiva/sclera: Conjunctivae normal    Cardiovascular:      Rate and Rhythm: Normal rate  Heart sounds: Normal heart sounds  No friction rub  Pulmonary:      Effort: Pulmonary effort is normal  No respiratory distress  Breath sounds: Normal breath sounds  No stridor  Abdominal:      General: There is no distension  Palpations: Abdomen is soft  Tenderness: There is no abdominal tenderness  Comments: Colostomy, dressing and drain in place   Musculoskeletal:         General: No swelling  Cervical back: Normal range of motion and neck supple  No rigidity  Skin:     General: Skin is warm  Coloration: Skin is not jaundiced  Neurological:      General: No focal deficit present  Mental Status: He is alert and oriented to person, place, and time  Psychiatric:         Mood and Affect: Mood normal          Behavior: Behavior normal              Review of Systems   Constitutional: Negative for chills and fatigue     HENT: Negative for congestion  Respiratory: Negative for cough, shortness of breath and wheezing  Cardiovascular: Negative for leg swelling  Gastrointestinal: Positive for abdominal pain  Negative for nausea  Musculoskeletal: Negative for back pain  Skin: Positive for wound  Neurological: Negative for headaches  Psychiatric/Behavioral: Negative for agitation and confusion  All other systems reviewed and are negative        Scheduled Meds:  Current Facility-Administered Medications   Medication Dose Route Frequency Provider Last Rate   • acetaminophen  975 mg Oral [facility] Jenna Decker MD     • amLODIPine  5 mg Oral BID Jenna Decker MD     • aspirin  81 mg Oral Daily Jenna Decker MD     • atorvastatin  40 mg Oral Daily Jenna Decker MD     • calcium carbonate  500 mg Oral BID PRN Juan Monsivais MD     • cholecalciferol  400 Units Oral Daily Jenna Decker MD     • heparin (porcine)  5,000 Units Subcutaneous Q8H [address] Ruth Tenorio PA-C     • HYDROmorphone  0 5 mg Intravenous Q3H PRN Juan Monsivais MD     • insulin glargine  15 Units Subcutaneous HS Jenna Decker MD     • insulin lispro  2-12 Units Subcutaneous TID AC Donovan Allen MD     • insulin lispro  4 Units Subcutaneous TID With Meals Chito Medel MD     • lidocaine (PF)    PRN Maria Del Carmen Costa MD     • methocarbamol  500 mg Oral BID Jenna Decker MD     • metoprolol  5 mg Intravenous Q8H PRN Maggy Villatoro PA-C     • multivitamin-minerals  1 tablet Oral Daily Jenna Decker MD     • ondansetron  4 mg Oral Q6H PRN Jenna Decker MD     • oxyCODONE  10 mg Oral Q6H PRN Jenna Decker MD     • oxyCODONE  5 mg Oral Q6H PRN Jenna Decker MD     • pantoprazole  40 mg Oral BID AC Jenna Decker MD     • piperacillin-tazobactam  4 5 g Intravenous Q6H Donovan Allen MD 4 5 g (12/11/22 0542)   • sodium bicarbonate infusion  100 mL/hr Intravenous Continuous Juan Monsivais  mL/hr (12/10/22 8480)   • tamsulosin  0 4 mg Oral Daily With Luiz Ayoub MD         PRN Meds:  •  calcium carbonate  •  HYDROmorphone  •  lidocaine (PF)  •  metoprolol  •  ondansetron  •  oxyCODONE  •  oxyCODONE    Continuous Infusions:sodium bicarbonate infusion, 100 mL/hr, Last Rate: 100 mL/hr (12/10/22 6141)          Invasive Devices: Invasive Devices     Central Venous Catheter Line  Duration           Port A Cath 03/10/22 Right Chest 275 days          Peripheral Intravenous Line  Duration           Peripheral IV 12/08/22 Dorsal (posterior); Right Hand 3 days          Drain  Duration           Ileostomy LUQ 23 days    Abscess Drain Abdomen 2 days    Cholecystostomy Tube 2 days                  LABORATORY:    Results from last 7 days   Lab Units 12/10/22  1232 12/09/22  1635 12/09/22  0541 12/08/22  0440 12/06/22  1018 12/06/22  0037 12/05/22  0925   WBC Thousand/uL  --   --  21 51* 28 29*  --  10 53* 12 98*   HEMOGLOBIN g/dL  --   --  8 2* 9 2*  --  10 5* 8 3*   HEMATOCRIT %  --   --  25 9* 29 2*  --  33 6* 27 7*   PLATELETS Thousands/uL  --   --  416* 416*  --  361 415*   POTASSIUM mmol/L 3 9 5 0 4 4  --  4 1 3 8 3 8   CHLORIDE mmol/L 107 109* 106  --  110* 110* 112*   CO2 mmol/L 20* 16* 19*  --  18* 20* 20*   BUN mg/dL 27* 25 22  --  18 18 19   CREATININE mg/dL 1 94* 1 71* 1 46*  --  1 25 1 33* 1 23   CALCIUM mg/dL 7 8* 7 9* 8 0*  --  8 5 8 2* 8 5   MAGNESIUM mg/dL  --   --  1 8  --   --   --   --    PHOSPHORUS mg/dL  --   --  4 6*  --  3 5  --   --       rest all reviewed    RADIOLOGY:  IR drainage tube placement   Final Result by Angel Reid MD (12/09 1559)   Ultrasound-guided abscess drainage catheter placement  Workstation performed: [id number]         IR cholecystostomy tube placement   Final Result by Angel Reid MD (12/09 1558)   Percutaneous cholecystostomy        Workstation performed: [id number]         US right upper quadrant   Final Result by Golden Jacobsen MD (12/08 1754)      Gallbladder wall thickening possibly reactive to the adjacent hepatic disease  Gallbladder pericholecystic fluid is nonspecific in the setting of abdominal ascites  Gallbladder distention is also nonspecific if the patient has not eaten  Negative sonographic Campbell's sign  Workstation performed: [id number]         CT chest abdomen pelvis w contrast   Final Result by Trent Henley MD (12/08 8758)      New findings of small left-sided extraluminal paracolic gas with small associated complex fluid which in close proximity to the colonic suture line; postoperative leak not excluded  Mild rim-enhancing left abdominal focal gas and fluid collection in    close proximity measuring 4 4 x 2 4 cm suggestive of developing abscess  Smaller 2 7 x 2 0 cm gas and fluid-containing rim-enhancing fluid collection in the left anterior abdomen (image 66, series 2) also suggestive of abscess  New significant gallbladder distention now measuring up to 13 x 7 cm with associated gallbladder wall thickening/edema  New focal fluid collection contiguous with the gallbladder fossa measuring 6 8 x 5 2 cm  Mildly enlarged fluid collection adjacent to the segment 3 hepatectomy site now measuring 8 1 x 7 1 x 8 0 cm, previously 6 8 x 6 6 x 8 2 cm  Volume of air within the collection is also increased  New small air locules within the midline abdominal wound which appear to track to the anterior surface of bowel worrisome for enterocutaneous fistula  Small volume abdominopelvic ascites and diffuse mesenteric edema, similar to prior  Bilateral lower lobe pneumonia  Small bilateral pleural effusions  The study was marked in [facility] for immediate notification  Workstation performed: [id number]         XR chest portable   Final Result by Clovis Manrique MD (12/07 1720)   Mild to moderate left effusion  Left lung base parenchymal process cannot be excluded  Workstation performed: [id number]         IR cholecystostomy tube check/change/reposition/reinsertion/upsize    (Results Pending)   IR drainage tube check/change/reposition/reinsertion/upsize    (Results Pending)     Rest all reviewed    Portions of the record may have been created with voice recognition software  Occasional wrong word or "sound a like" substitutions may have occurred due to the inherent limitations of voice recognition software  Read the chart carefully and recognize, using context, where substitutions have occurred  If you have any questions, please contact the dictating provider

## 2022-12-12 ENCOUNTER — APPOINTMENT (INPATIENT)
Dept: RADIOLOGY | Facility: HOSPITAL | Age: 57
End: 2022-12-12

## 2022-12-12 LAB
ABO GROUP BLD BPU: NORMAL
ANION GAP SERPL CALCULATED.3IONS-SCNC: 6 MMOL/L (ref 4–13)
ANISOCYTOSIS BLD QL SMEAR: PRESENT
ATRIAL RATE: 77 BPM
BACTERIA SPEC BFLD CULT: NO GROWTH
BASOPHILS # BLD MANUAL: 0 THOUSAND/UL (ref 0–0.1)
BASOPHILS NFR MAR MANUAL: 0 % (ref 0–1)
BPU ID: NORMAL
BUN SERPL-MCNC: 22 MG/DL (ref 5–25)
CALCIUM SERPL-MCNC: 8.3 MG/DL (ref 8.3–10.1)
CHLORIDE SERPL-SCNC: 107 MMOL/L (ref 96–108)
CO2 SERPL-SCNC: 25 MMOL/L (ref 21–32)
CREAT SERPL-MCNC: 1.95 MG/DL (ref 0.6–1.3)
CROSSMATCH: NORMAL
EOSINOPHIL # BLD MANUAL: 0.13 THOUSAND/UL (ref 0–0.4)
EOSINOPHIL NFR BLD MANUAL: 1 % (ref 0–6)
ERYTHROCYTE [DISTWIDTH] IN BLOOD BY AUTOMATED COUNT: 17.7 % (ref 11.6–15.1)
GFR SERPL CREATININE-BSD FRML MDRD: 36 ML/MIN/1.73SQ M
GLUCOSE SERPL-MCNC: 105 MG/DL (ref 65–140)
GLUCOSE SERPL-MCNC: 116 MG/DL (ref 65–140)
GLUCOSE SERPL-MCNC: 117 MG/DL (ref 65–140)
GLUCOSE SERPL-MCNC: 142 MG/DL (ref 65–140)
GLUCOSE SERPL-MCNC: 79 MG/DL (ref 65–140)
GRAM STN SPEC: NORMAL
HCT VFR BLD AUTO: 25.5 % (ref 36.5–49.3)
HGB BLD-MCNC: 8 G/DL (ref 12–17)
LYMPHOCYTES # BLD AUTO: 1.59 THOUSAND/UL (ref 0.6–4.47)
LYMPHOCYTES # BLD AUTO: 12 % (ref 14–44)
MCH RBC QN AUTO: 26.6 PG (ref 26.8–34.3)
MCHC RBC AUTO-ENTMCNC: 31.4 G/DL (ref 31.4–37.4)
MCV RBC AUTO: 85 FL (ref 82–98)
MONOCYTES # BLD AUTO: 0.66 THOUSAND/UL (ref 0–1.22)
MONOCYTES NFR BLD: 5 % (ref 4–12)
MYELOCYTES NFR BLD MANUAL: 1 % (ref 0–1)
NEUTROPHILS # BLD MANUAL: 10.71 THOUSAND/UL (ref 1.85–7.62)
NEUTS SEG NFR BLD AUTO: 81 % (ref 43–75)
P AXIS: 20 DEGREES
PLATELET # BLD AUTO: 293 THOUSANDS/UL (ref 149–390)
PLATELET BLD QL SMEAR: ADEQUATE
PMV BLD AUTO: 9.8 FL (ref 8.9–12.7)
POIKILOCYTOSIS BLD QL SMEAR: PRESENT
POTASSIUM SERPL-SCNC: 3.5 MMOL/L (ref 3.5–5.3)
PR INTERVAL: 192 MS
QRS AXIS: -4 DEGREES
QRSD INTERVAL: 144 MS
QT INTERVAL: 432 MS
QTC INTERVAL: 488 MS
RBC # BLD AUTO: 3.01 MILLION/UL (ref 3.88–5.62)
RBC MORPH BLD: PRESENT
SODIUM SERPL-SCNC: 138 MMOL/L (ref 135–147)
T WAVE AXIS: 7 DEGREES
TARGETS BLD QL SMEAR: PRESENT
UNIT DISPENSE STATUS: NORMAL
UNIT PRODUCT CODE: NORMAL
UNIT PRODUCT VOLUME: 300 ML
UNIT RH: NORMAL
VENTRICULAR RATE: 77 BPM
WBC # BLD AUTO: 13.22 THOUSAND/UL (ref 4.31–10.16)

## 2022-12-12 PROCEDURE — BF121ZZ FLUOROSCOPY OF GALLBLADDER USING LOW OSMOLAR CONTRAST: ICD-10-PCS | Performed by: INTERNAL MEDICINE

## 2022-12-12 PROCEDURE — 0F24X0Z CHANGE DRAINAGE DEVICE IN GALLBLADDER, EXTERNAL APPROACH: ICD-10-PCS | Performed by: INTERNAL MEDICINE

## 2022-12-12 RX ORDER — POTASSIUM CHLORIDE 20 MEQ/1
40 TABLET, EXTENDED RELEASE ORAL ONCE
Status: COMPLETED | OUTPATIENT
Start: 2022-12-12 | End: 2022-12-12

## 2022-12-12 RX ADMIN — METHOCARBAMOL TABLETS 500 MG: 500 TABLET, COATED ORAL at 17:40

## 2022-12-12 RX ADMIN — PANTOPRAZOLE SODIUM 40 MG: 40 TABLET, DELAYED RELEASE ORAL at 08:58

## 2022-12-12 RX ADMIN — AMLODIPINE BESYLATE 5 MG: 5 TABLET ORAL at 08:55

## 2022-12-12 RX ADMIN — ATORVASTATIN CALCIUM 40 MG: 40 TABLET, FILM COATED ORAL at 08:55

## 2022-12-12 RX ADMIN — HEPARIN SODIUM 5000 UNITS: 5000 INJECTION INTRAVENOUS; SUBCUTANEOUS at 22:15

## 2022-12-12 RX ADMIN — CHOLECALCIFEROL TAB 10 MCG (400 UNIT) 400 UNITS: 10 TAB at 08:55

## 2022-12-12 RX ADMIN — HEPARIN SODIUM 5000 UNITS: 5000 INJECTION INTRAVENOUS; SUBCUTANEOUS at 13:00

## 2022-12-12 RX ADMIN — INSULIN LISPRO 6 UNITS: 100 INJECTION, SOLUTION INTRAVENOUS; SUBCUTANEOUS at 17:42

## 2022-12-12 RX ADMIN — INSULIN GLARGINE 15 UNITS: 100 INJECTION, SOLUTION SUBCUTANEOUS at 22:15

## 2022-12-12 RX ADMIN — PIPERACILLIN AND TAZOBACTAM 4.5 G: 36; 4.5 INJECTION, POWDER, FOR SOLUTION INTRAVENOUS at 10:19

## 2022-12-12 RX ADMIN — PIPERACILLIN AND TAZOBACTAM 4.5 G: 36; 4.5 INJECTION, POWDER, FOR SOLUTION INTRAVENOUS at 03:16

## 2022-12-12 RX ADMIN — TAMSULOSIN HYDROCHLORIDE 0.4 MG: 0.4 CAPSULE ORAL at 17:40

## 2022-12-12 RX ADMIN — INSULIN LISPRO 6 UNITS: 100 INJECTION, SOLUTION INTRAVENOUS; SUBCUTANEOUS at 08:56

## 2022-12-12 RX ADMIN — ACETAMINOPHEN 975 MG: 325 TABLET ORAL at 13:00

## 2022-12-12 RX ADMIN — ACETAMINOPHEN 975 MG: 325 TABLET ORAL at 05:16

## 2022-12-12 RX ADMIN — AMLODIPINE BESYLATE 5 MG: 5 TABLET ORAL at 17:40

## 2022-12-12 RX ADMIN — HEPARIN SODIUM 5000 UNITS: 5000 INJECTION INTRAVENOUS; SUBCUTANEOUS at 05:16

## 2022-12-12 RX ADMIN — OXYCODONE HYDROCHLORIDE 10 MG: 10 TABLET ORAL at 12:50

## 2022-12-12 RX ADMIN — PANTOPRAZOLE SODIUM 40 MG: 40 TABLET, DELAYED RELEASE ORAL at 17:40

## 2022-12-12 RX ADMIN — IOHEXOL 20 ML: 300 INJECTION, SOLUTION INTRAVENOUS at 15:29

## 2022-12-12 RX ADMIN — Medication 1 TABLET: at 08:55

## 2022-12-12 RX ADMIN — SODIUM BICARBONATE 650 MG TABLET 650 MG: at 08:55

## 2022-12-12 RX ADMIN — ALBUMIN (HUMAN) 25 G: 0.25 INJECTION, SOLUTION INTRAVENOUS at 05:15

## 2022-12-12 RX ADMIN — POTASSIUM CHLORIDE 40 MEQ: 1500 TABLET, EXTENDED RELEASE ORAL at 08:58

## 2022-12-12 RX ADMIN — ASPIRIN 81 MG CHEWABLE TABLET 81 MG: 81 TABLET CHEWABLE at 08:55

## 2022-12-12 RX ADMIN — METHOCARBAMOL TABLETS 500 MG: 500 TABLET, COATED ORAL at 08:55

## 2022-12-12 RX ADMIN — LIDOCAINE HYDROCHLORIDE 10 ML: 10 INJECTION, SOLUTION EPIDURAL; INFILTRATION; INTRACAUDAL; PERINEURAL at 15:17

## 2022-12-12 RX ADMIN — INSULIN LISPRO 6 UNITS: 100 INJECTION, SOLUTION INTRAVENOUS; SUBCUTANEOUS at 12:59

## 2022-12-12 RX ADMIN — ERTAPENEM SODIUM 1000 MG: 1 INJECTION, POWDER, LYOPHILIZED, FOR SOLUTION INTRAMUSCULAR; INTRAVENOUS at 12:50

## 2022-12-12 RX ADMIN — ACETAMINOPHEN 975 MG: 325 TABLET ORAL at 22:15

## 2022-12-12 NOTE — ARC ADMISSION
ARC continues to follow patient's case at this time, monitoring for medical stability and functional progress  Will update as able  Spoke with [employer],  at [facility], [employer], [employer] and [employer] authorization request for inpatient acute rehab  Was notified that patient has been approved for [facility] admission, and fax has been sent with confirmation of such  CM has been updated  Noted that patient is not cleared for discharge at this time  Will continue to follow and update as able

## 2022-12-12 NOTE — PROGRESS NOTES
Progress Note - Infectious Disease   Jessica Ferreira 61 y o  male MRN: [id number]  Unit/Bed#: [facility] 906-01 Encounter: [id number]      Impression/Plan:  1   Sepsis- evolving since admission   Leukocytosis and tachycardia  Appears to be secondary to recurrent intra-abdominal abscesses  Consideration for the possibility of bacteremia once again   Fortunately the patient remained hemodynamically stable despite his systemic illness  -Discontinue Zosyn  -Begin ertapenem 1 g IV every 24 hours  -Follow-up blood cultures and fluid cultures and adjust antibiotics needed  -Source control measures as below  -Recheck CBC with differential and CMP  -Supportive care     2   Intra-abdominal abscesses-in the setting of a recent complex surgical history including resection of segments of the liver, transverse colon resection, intra-abdominal abscesses requiring drainage and ileostomy   Patient now with possible leak into the region of the previous liver resections and 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 with succus drainage of the wound  Patient now status post VICTORINO drain placement with purulent fluid sent for culture and a drain remaining in place  -Antibiotics as above  -Follow-up cultures and adjust antibiotics needed  -Drain management  -Close surgical follow-up  -Plan antibiotics through 12/15/2022 which will complete 7 days from the drainage     3   Possible acute cholecystitis-although not much in the way of abdominal tenderness in the right upper quadrant    Patient status post percutaneous cholecystotomy drain  -Drain management  -Antibiotics as above     4   Recent anastomotic leak with intra-abdominal abscess/infected hematoma- status post exploratory laparotomy with drainage of the abscess and cecal resection with wound culture growing ESBL E  Coli  -Antibiotics as above  -Source control measures as above     5   Recent ESBL E  coli bacteremia-as a complication of the recent anastomotic leak with intra-abdominal abscess formation   Patient received a course of ertapenem for 7 days postoperatively   -Antibiotics as above for now  -Follow-up blood cultures     6   Diabetes mellitus-type II    Discussed the above management plan with the primary service    Antibiotics:  Zosyn 5  Post drain placement 4    Subjective:  Patient has no fever, chills, sweats; no nausea, vomiting, diarrhea; no cough, shortness of breath; no pain  No new symptoms  Objective:  Vitals:  Temp:  [96 8 °F (36 °C)-97 3 °F (36 3 °C)] 96 8 °F (36 °C)  HR:  [76-90] 85  Resp:  [18-20] 18  BP: (135-153)/(76-83) 135/76  SpO2:  [95 %-97 %] 95 %  Temp (24hrs), Av °F (36 1 °C), Min:96 8 °F (36 °C), Max:97 3 °F (36 3 °C)  Current: Temperature: (!) 96 8 °F (36 °C)    Physical Exam:   General Appearance:  Alert, interactive, nontoxic, no acute distress  Throat: Oropharynx moist without lesions  Lungs:   Clear to auscultation bilaterally; no wheezes, rhonchi or rales; respirations unlabored   Heart:  RRR; no murmur, rub or gallop   Abdomen:   Soft, non-tender, non-distended, positive bowel sounds  Right-sided drains in place  Extremities: No clubbing, cyanosis or edema   Skin: No new rashes or lesions  No draining wounds noted         Labs, Imaging, & Other studies:   All pertinent labs and imaging studies were personally reviewed  Results from last 7 days   Lab Units 22  0509 22  2126 22  1051 22  0648   WBC Thousand/uL 13 22*  --  13 09* 12 67*   HEMOGLOBIN g/dL 8 0* 7 4* 6 6* 6 4*   PLATELETS Thousands/uL 293  --  297 296     Results from last 7 days   Lab Units 22  0509 22  0648 12/10/22  1232   SODIUM mmol/L 138 137 137   POTASSIUM mmol/L 3 5 3 6 3 9   CHLORIDE mmol/L 107 106 107   CO2 mmol/L 25 21 20*   BUN mg/dL 22 26* 27*   CREATININE mg/dL 1 95* 2 10* 1 94*   EGFR ml/min/1 73sq m 36 33 37   CALCIUM mg/dL 8 3 8 0* 7 8*   AST U/L  --  96* 122*   ALT U/L  --  32 37   ALK PHOS U/L  --  994* 961*     Results from last 7 days   Lab Units 12/08/22  1903 12/08/22  1836 12/08/22  1619 12/08/22  1618   BLOOD CULTURE   --   --  No Growth at 72 hrs  No Growth at 72 hrs     GRAM STAIN RESULT  4+ Gram negative rods*  No polys seen* No Polys or Bacteria seen  --   --    BODY FLUID CULTURE, STERILE  4+ Growth of Escherichia coli ESBL*  4+ Growth of No growth  --   --

## 2022-12-12 NOTE — QUICK NOTE
Surgery  Quick note    Discussed case with cardiology fellow on call yesterday  trops plateau and chest pain had resolved  EKG showing normal sinus  No indication for cardiology consult at this time  Consult removed       Babak Swann MD  12/12/22  6 AM

## 2022-12-12 NOTE — BRIEF OP NOTE (RAD/CATH)
IR CHOLECYSTOSTOMY TUBE CHECK/CHANGE/REPOSITION/REINSERTION/UPSIZE Procedure Note    PATIENT NAME: Jessica Ferreira  : 1965  MRN: [id number]    Pre-op Diagnosis:   1  Type 2 diabetes mellitus without complication, with long-term current use of insulin ([address] )    2  Acute respiratory failure with hypoxia (HCC)    3  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 liver ([address] )    4  Colonic mass    5  Colostomy prolapse ([address] )    6  Postprocedural intraabdominal abscess    7  Cholecystitis    8  Acute kidney injury ([address] )      Post-op Diagnosis:   1  Type 2 diabetes mellitus without complication, with long-term current use of insulin ([address] )    2  Acute respiratory failure with hypoxia (HCC)    3  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 liver ([address] )    4  Colonic mass    5  Colostomy prolapse ([address] )    6  Postprocedural intraabdominal abscess    7  Cholecystitis    8   Acute kidney injury [facility])        Provider:   GAURANG Montoya  Assistants:     No qualified resident was available, Resident is only observing    Estimated Blood Loss: minimal     Findings: radha tube slightly retracted, repositioned further into the gallbladder with 10f APD    Specimens: none    Complications:  None immediate     Anesthesia: local    Odalis Morley     Date: 2022  Time: 3:34 PM

## 2022-12-12 NOTE — PROGRESS NOTES
Patient had troponin's that were due earlier in the day that had not been drawn was contacted by MD to draw them to trend his Trops due to patient having chest heaviness earlier in the day  Blood work done as soon as we were able to get it due to patient being as tough stick  Order in for cardiology to see patient and in the note states to continue telemetry/  Patient is not on tele nor has he been  Questioned this with on call surgery resident and not needed at this time

## 2022-12-12 NOTE — RESTORATIVE TECHNICIAN NOTE
Restorative Technician Note      Patient Name: Merirtt Whiting     Restorative Tech Visit Date: 12/12/22  Note Type: Mobility  Patient Position Upon Consult: Bedside chair  Activity Performed: Ambulated  Assistive Device: Roller walker; Other (Comment) (2L O2)  Patient Position at End of Consult: Bedside chair;  All needs within [facility]

## 2022-12-12 NOTE — UTILIZATION REVIEW
Continued Stay Review    Date: 12/11/2022                        Current Patient Class: inpatient Current Level of Care: med/surg    HPI:57 y o  male s/p segment 3 liver resection with transverse colectomy and takedown of colostomy followed by diverting loop ileostomy and right hemicolectomy initially admitted on 12/4 obs to inpatient 12/7 with anastomotic leak, for continued management of wound and pain management  Assessment/Plan:  Pt reports continued abdominal pain, tolerating diet but not eating all of his meals, does have some nausea when he eats too much without any episodes of emesis, ileostomy with function, voiding without issues, oob to chair, using incentive spirometry  WBC's to 12 67  Hgb 6 4, Plts 296 this AM  Cr to 2 10 this AM  On O2 2L nc, wean off as able  Encouarge IS, OOB  Continue Cons carb diet  Will repeat CBC now  Nephrology following for rising creatinine, ALEXY in setting of known CKD -- hold further IVFs, start albumin 25 g IV q6h to help with intravascular volume shifts  Continue to trend creatinine  IV Zosyn  Abscess drain, Percutaneous cholecystomy tube, and Ileostomy; monitor output and character  Local wound care to midline wound  SQH/SCDs  Encourage ambulation  Pain control  Endocrine increasing Humalog to 6 units 3 times daily with meals  Continue current Lantus 15 units nightly  Continue scale for correction   SL ARC following for medical stability and functional progress    UOP 1250  Percutaneous cholecystostomy tube 20 red-tinged bilious  Left-sided abscess drained 45 purulent appearing  Ileostomy 350     Vital Signs:   Date/Time Temp Pulse Resp BP MAP (mmHg) SpO2 Calculated FIO2 (%) - Nasal Cannula Nasal Cannula O2 Flow Rate (L/min) O2 Device   12/11/22 21:12:19 97 3 °F (36 3 °C) Abnormal  90 18 153/83 106 95 % -- -- --   12/11/22 18:40:08 97 2 °F (36 2 °C) Abnormal  89 20 145/76 99 96 % -- -- --   12/11/22 15:53:50 97 °F (36 1 °C) Abnormal  85 20 147/80 102 96 % -- -- -- 12/11/22 1524 97 °F (36 1 °C) Abnormal  82 20 140/80 -- -- -- -- --   12/11/22 14:51:49 97 °F (36 1 °C) Abnormal  82 20 140/80 100 95 % -- -- --   12/11/22 1135 -- -- -- -- -- -- 28 2 L/min Nasal cannula   12/11/22 11:32:08 96 8 °F (36 °C) Abnormal  76 -- 139/79 99 97 % -- -- --   12/11/22 09:29:26 97 2 °F (36 2 °C) Abnormal  -- 17 142/77 99 -- -- -- --   12/10/22 2238 -- -- -- -- -- 95 % 28 2 L/min Nasal cannula   12/10/22 14:53:56 97 2 °F (36 2 °C) Abnormal  89 20 135/74 94 95 % -- -- --   12/10/22 0825 -- -- -- -- -- -- 28 2 L/min Nasal cannula   12/10/22 06:36:03 97 °F (36 1 °C) Abnormal  79 16 143/74 97 97 % -- -- --       Pertinent Labs/Diagnostic Results:   Results from last 7 days   Lab Units 12/11/22  2126 12/11/22  1051 12/11/22  0648 12/09/22  0541 12/08/22  0440 12/05/22  0925   WBC Thousand/uL  --  13 09* 12 67* 21 51* 28 29* 12 98*   HEMOGLOBIN g/dL 7 4* 6 6* 6 4* 8 2* 9 2* 8 3*   HEMATOCRIT % 23 6* 20 9* 20 3* 25 9* 29 2* 27 7*   PLATELETS Thousands/uL  --  297 296 416* 416* 415*   NEUTROS ABS Thousands/µL  --   --   --   --  24 17* 10 22*   BANDS PCT %  --   --   --  4  --   --      Results from last 7 days   Lab Units 12/11/22  0648 12/10/22  1232 12/09/22  1635 12/09/22  0541 12/06/22  1018   SODIUM mmol/L 137 137 132* 132* 135   POTASSIUM mmol/L 3 6 3 9 5 0 4 4 4 1   CHLORIDE mmol/L 106 107 109* 106 110*   CO2 mmol/L 21 20* 16* 19* 18*   ANION GAP mmol/L 10 10 7 7 7   BUN mg/dL 26* 27* 25 22 18   CREATININE mg/dL 2 10* 1 94* 1 71* 1 46* 1 25   EGFR ml/min/1 73sq m 33 37 43 52 63   CALCIUM mg/dL 8 0* 7 8* 7 9* 8 0* 8 5   MAGNESIUM mg/dL 1 9  --   --  1 8  --    PHOSPHORUS mg/dL 3 9  --   --  4 6* 3 5     Results from last 7 days   Lab Units 12/11/22  0648 12/10/22  1232   AST U/L 96* 122*   ALT U/L 32 37   ALK PHOS U/L 994* 961*   TOTAL PROTEIN g/dL 6 6 6 5   ALBUMIN g/dL 1 4* 1 0*   TOTAL BILIRUBIN mg/dL 1 84* 1 68*     Results from last 7 days   Lab Units 12/11/22  2105 12/11/22  1628 12/11/22  1118 12/10/22  2053 12/10/22  1620 12/10/22  1122 12/10/22  0753 12/09/22  2144 12/09/22  1817 12/09/22  1115 12/09/22  0754   POC GLUCOSE mg/dl 163* 159* 170* 183* 219* 213* 169* 181* 194* 172* 136     Results from last 7 days   Lab Units 12/11/22  0648 12/10/22  1232 12/09/22  1635 12/09/22  0541 12/06/22  1018 12/06/22  0037 12/05/22  0925   GLUCOSE RANDOM mg/dL 165* 244* 215* 137 207* 195* 180*     Results from last 7 days   Lab Units 12/11/22  2242 12/11/22  1051   HS TNI 0HR ng/L  --  72*   HS TNI 2HR ng/L 69*  --    HSTNI D2 ng/L -3  --      Results from last 7 days   Lab Units 12/10/22  0517   CLARITY UA  Extra Turbid   COLOR UA  Yellow   SPEC GRAV UA  1 025   PH UA  5 5   GLUCOSE UA mg/dl 100 (1/10%)*   KETONES UA mg/dl Negative   BLOOD UA  Trace*   PROTEIN UA mg/dl 100 (2+)*   NITRITE UA  Negative   BILIRUBIN UA  Negative   UROBILINOGEN UA (BE) mg/dl <2 0   LEUKOCYTES UA  Negative   WBC UA /hpf None Seen   RBC UA /hpf None Seen   BACTERIA UA /hpf Innumerable*   EPITHELIAL CELLS WET PREP /hpf Occasional   MUCUS THREADS  Occasional*   SODIUM UR  32     Results from last 7 days   Lab Units 12/08/22  1903 12/08/22  1836 12/08/22  1619 12/08/22  1618   BLOOD CULTURE   --   --  No Growth at 72 hrs  No Growth at 72 hrs     GRAM STAIN RESULT  4+ Gram negative rods*  No polys seen* No Polys or Bacteria seen  --   --    BODY FLUID CULTURE, STERILE  4+ Growth of Escherichia coli ESBL*  4+ Growth of No growth  --   --        Medications:   Scheduled Medications:  acetaminophen, 975 mg, Oral, Q8H SHANNON  amLODIPine, 5 mg, Oral, BID  aspirin, 81 mg, Oral, Daily  atorvastatin, 40 mg, Oral, Daily  cholecalciferol, 400 Units, Oral, Daily  heparin (porcine), 5,000 Units, Subcutaneous, Q8H SHANNON  insulin glargine, 15 Units, Subcutaneous, HS  insulin lispro, 1-5 Units, Subcutaneous, HS  insulin lispro, 1-6 Units, Subcutaneous, TID AC  insulin lispro, 6 Units, Subcutaneous, TID With Meals  methocarbamol, 500 mg, Oral, BID  multivitamin-minerals, 1 tablet, Oral, Daily  pantoprazole, 40 mg, Oral, BID AC  piperacillin-tazobactam, 4 5 g, Intravenous, Q6H  potassium chloride, 40 mEq, Oral, Once  sodium bicarbonate, 650 mg, Oral, BID after meals  tamsulosin, 0 4 mg, Oral, Daily With Dinner    PRN Meds:  calcium carbonate, 500 mg, Oral, BID PRN 12/11 x1  HYDROmorphone, 0 5 mg, Intravenous, Q3H PRN  lidocaine (PF), , , PRN  metoprolol, 5 mg, Intravenous, Q8H PRN  ondansetron, 4 mg, Oral, Q6H PRN  oxyCODONE, 10 mg, Oral, Q6H PRN 12/10 x1, 1211 x1  oxyCODONE, 5 mg, Oral, Q6H PRN        Discharge Plan: [facility]    Network Utilization Review Department  ATTENTION: Please call with any questions or concerns to [phone] and carefully listen to the prompts so that you are directed to the right person  All voicemails are confidential   Waleska Santiago all requests for admission clinical reviews, approved or denied determinations and any other requests to dedicated fax number below belonging to the campus where the patient is receiving treatment   List of dedicated fax numbers for the Facilities:  [address] DENIALS (Administrative/Medical Necessity) [phone]   [address] (Maternity/NICU/Pediatrics) [phone]   91 Ericka Pinto [phone]   [facility] [address] [phone]   [address] [place] [address] [address] [phone]   [address] [place] Beatrice Olav [facility] [place] [address] 815 [facility] [phone]

## 2022-12-12 NOTE — PROGRESS NOTES
Progress Note - Savage Holloway 61 y o  male MRN: [id number]    Unit/Bed#: [facility] 906-01 Encounter: [id number]      Assessment:  60yoM s/p segment 3 liver resection with transverse colectomy and takedown of colostomy followed by diverting loop ileostomy and right hemicolectomy for anastomotic leak   Now status post IR drainage of intra-abdominal abscess and gallbladder    Vss  Afebrile  abd soft, nontender, non distended  Transfused 1 u prbc yesterday for hgb 6 6-> 7 4-> 8 today  Uop: 2 6L  Perc radha: 80cc serosang bilious ouput   IR abscess drain: 45 cc   Ileostomy: 350 cc  Plan:  ccdl2  Continue zosyn through today for a total 4 d post IR drains  Follow up infectious disease recs  Prn albumin  Maintain drains, flush prn  Ostomy care per protocol  Local wound care midline wound  Follow hgb, transfuse < 7  Follow cr, appreciate nephro recs for ALEXY  Case mgt dispo planning    Subjective:   Feels the best he has felt  However doesn't feel ready for rehab yet  Denied any chest pain or shortness of breath  Denied fever or chills  Ate a full amount of regular diet yesterday  Objective:     Vitals: Blood pressure 153/83, pulse 90, temperature (!) 97 3 °F (36 3 °C), resp  rate 18, height 5' 7 99" (1 727 m), weight 109 kg (239 lb 13 8 oz), SpO2 95 %  ,Body mass index is 36 48 kg/m²  Intake/Output Summary (Last 24 hours) at 12/11/2022 2147  Last data filed at 12/11/2022 2109  Gross per 24 hour   Intake 410 ml   Output 2665 ml   Net -2255 ml       Physical Exam  General: NAD  HEENT: NC/AT  MMM  Cv: RRR  Lungs: normal effort  Ab: Soft, NT/ND  IR liver abscess drain, purulent  Perc radha drain serosang bilious     Ex: no CCE  Neuro: AAOx3    Scheduled Meds:  Current Facility-Administered Medications   Medication Dose Route Frequency Provider Last Rate   • acetaminophen  975 mg Oral [facility] Jacob Turner MD     • albumin human  25 g Intravenous Q6H Rhea Zamora MD     • amLODIPine  5 mg Oral BID Drew Hughes Bernie Vieira MD     • aspirin  81 mg Oral Daily Dewayne Suarez MD     • atorvastatin  40 mg Oral Daily Dewayne Suarez MD     • calcium carbonate  500 mg Oral BID PRN Shelagh Bumpers, MD     • cholecalciferol  400 Units Oral Daily Dewayne Suarez MD     • heparin (porcine)  5,000 Units Subcutaneous Q8H [address] Narayan Reece PA-C     • HYDROmorphone  0 5 mg Intravenous Q3H PRN Shelagh Bumpers, MD     • insulin glargine  15 Units Subcutaneous HS Dewayne Suarez MD     • insulin lispro  1-5 Units Subcutaneous HS Araceli Parikh MD     • insulin lispro  1-6 Units Subcutaneous TID AC Richard Mcintyre MD     • insulin lispro  6 Units Subcutaneous TID With Meals Richard Mcintyre MD     • lidocaine (PF)    PRN [place] Alvarez MD     • methocarbamol  500 mg Oral BID Dewayne Suarez MD     • metoprolol  5 mg Intravenous Q8H PRN Maggy Villatoro PA-C     • multivitamin-minerals  1 tablet Oral Daily Dewayne Suarez MD     • ondansetron  4 mg Oral Q6H PRN Dewayne Suarez MD     • oxyCODONE  10 mg Oral Q6H PRN Dewayne Suarez MD     • oxyCODONE  5 mg Oral Q6H PRN Dewayne Suarez MD     • pantoprazole  40 mg Oral BID AC Dewayne Suarez MD     • piperacillin-tazobactam  4 5 g Intravenous Q6H Kenya Allen MD 4 5 g (12/11/22 1852)   • sodium bicarbonate  650 mg Oral BID after meals Bushra Balderrama MD     • tamsulosin  0 4 mg Oral Daily With [address] Evelia Lockwood MD       Continuous Infusions:   PRN Meds:  •  calcium carbonate  •  HYDROmorphone  •  lidocaine (PF)  •  metoprolol  •  ondansetron  •  oxyCODONE  •  oxyCODONE      Invasive Devices     Central Venous Catheter Line  Duration           Port A Cath 03/10/22 Right Chest 276 days          Peripheral Intravenous Line  Duration           Peripheral IV 12/08/22 Dorsal (posterior); Right Hand 3 days          Drain  Duration           Ileostomy LUQ 24 days    Abscess Drain Abdomen 3 days    Cholecystostomy Tube 3 days                Lab, Imaging and other studies: I have personally reviewed pertinent reports      VTE Pharmacologic Prophylaxis: Heparin  VTE Mechanical Prophylaxis: sequential compression device

## 2022-12-12 NOTE — PLAN OF CARE
Problem: PAIN - ADULT  Goal: Verbalizes/displays adequate comfort level or baseline comfort level  Description: Interventions:  - Encourage patient to monitor pain and request assistance  - Assess pain using appropriate pain scale  - Administer analgesics based on type and severity of pain and evaluate response  - Implement non-pharmacological measures as appropriate and evaluate response  - Consider cultural and social influences on pain and pain management  - Notify physician/advanced practitioner if interventions unsuccessful or patient reports new pain  Outcome: Progressing     Problem: INFECTION - ADULT  Goal: Absence or prevention of progression during hospitalization  Description: INTERVENTIONS:  - Assess and monitor for signs and symptoms of infection  - Monitor lab/diagnostic results  - Monitor all insertion sites, i e  indwelling lines, tubes, and drains  - Monitor endotracheal if appropriate and nasal secretions for changes in amount and color  - [place] appropriate cooling/warming therapies per order  - Administer medications as ordered  - Instruct and encourage patient and family to use good hand hygiene technique  - Identify and instruct in appropriate isolation precautions for identified infection/condition  Outcome: Progressing  Goal: Absence of fever/infection during neutropenic period  Description: INTERVENTIONS:  - Monitor WBC    Outcome: Progressing     Problem: SAFETY ADULT  Goal: Patient will remain free of falls  Description: INTERVENTIONS:  - Educate patient/family on patient safety including physical limitations  - Instruct patient to call for assistance with activity   - Consult OT/PT to assist with strengthening/mobility   - Keep Call bell within reach  - Keep bed low and locked with side rails adjusted as appropriate  - Keep care items and personal belongings within reach  - Initiate and maintain comfort rounds  - Make Fall Risk Sign visible to staff  - Apply yellow socks and bracelet for high fall risk patients  - Consider moving patient to room near nurses station  Outcome: Progressing  Goal: Maintain or return to baseline ADL function  Description: INTERVENTIONS:  -  Assess patient's ability to carry out ADLs; assess patient's baseline for ADL function and identify physical deficits which impact ability to perform ADLs (bathing, care of mouth/teeth, toileting, grooming, dressing, etc )  - Assess/evaluate cause of self-care deficits   - Assess range of motion  - Assess patient's mobility; develop plan if impaired  - Assess patient's need for assistive devices and provide as appropriate  - Encourage maximum independence but intervene and supervise when necessary  - Involve family in performance of ADLs  - Assess for home care needs following discharge   - Consider OT consult to assist with ADL evaluation and planning for discharge  - Provide patient education as appropriate  Outcome: Progressing  Goal: Maintains/Returns to pre admission functional level  Description: INTERVENTIONS:  - Perform BMAT or MOVE assessment daily    - Set and communicate daily mobility goal to care team and patient/family/caregiver     - Collaborate with rehabilitation services on mobility goals if consulted  - Out of bed for toileting  - Record patient progress and toleration of activity level   Outcome: Progressing     Problem: DISCHARGE PLANNING  Goal: Discharge to home or other facility with appropriate resources  Description: INTERVENTIONS:  - Identify barriers to discharge w/patient and caregiver  - Arrange for needed discharge resources and transportation as appropriate  - Identify discharge learning needs (meds, wound care, etc )  - Arrange for interpretive services to assist at discharge as needed  - Refer to Case Management Department for coordinating discharge planning if the patient needs post-hospital services based on physician/advanced practitioner order or complex needs related to functional status, cognitive ability, or social support system  Outcome: Progressing     Problem: Knowledge Deficit  Goal: Patient/family/caregiver demonstrates understanding of disease process, treatment plan, medications, and discharge instructions  Description: Complete learning assessment and assess knowledge base    Interventions:  - Provide teaching at level of understanding  - Provide teaching via preferred learning methods  Outcome: Progressing     Problem: SKIN/TISSUE INTEGRITY - ADULT  Goal: Skin Integrity remains intact(Skin Breakdown Prevention)  Description: Assess:  -Inspect skin when repositioning, toileting, and assisting with ADLS  -Assess extremities for adequate circulation and sensation     Bed Management:  -Have minimal linens on bed & keep smooth, unwrinkled  -Change linens as needed when moist or perspiring    Toileting:  -Offer bedside commode    Activity:  -Encourage activity and walks on unit  -Encourage or provide ROM exercises   -Use appropriate equipment to lift or move patient in bed    Skin Care:    Problem: SKIN/TISSUE INTEGRITY - ADULT  Goal: Pressure injury heals and does not worsen  Description: Interventions:  - Implement low air loss mattress or specialty surface (Criteria met)  - Apply silicone foam dressing  - Apply fecal or urinary incontinence containment device   - Utilize friction reducing device or surface for transfers   - Consider nutrition services referral as needed  Outcome: Progressing     -Do not massage red bony areas  Outcome: Progressing  Goal: Incision(s), wounds(s) or drain site(s) healing without S/S of infection  Description: INTERVENTIONS  - Assess and document dressing, incision, wound bed, drain sites and surrounding tissue  - Provide patient and family education  Outcome: Progressing  Goal: Pressure injury heals and does not worsen  Description: Interventions:  - Implement low air loss mattress or specialty surface (Criteria met)  - Apply silicone foam dressing  - Apply fecal or urinary incontinence containment device   - Utilize friction reducing device or surface for transfers   - Consider nutrition services referral as needed  Outcome: Progressing     Problem: Prexisting or High Potential for Compromised Skin Integrity  Goal: Skin integrity is maintained or improved  Description: INTERVENTIONS:  - Identify patients at risk for skin breakdown  - Assess and monitor skin integrity  - Assess and monitor nutrition and hydration status  - Monitor labs   - Assess for incontinence   - Turn and reposition patient  - Assist with mobility/ambulation  - Relieve pressure over bony prominences  - Avoid friction and shearing  - Provide appropriate hygiene as needed including keeping skin clean and dry  - Evaluate need for skin moisturizer/barrier cream  - Collaborate with interdisciplinary team   - Patient/family teaching  - Consider wound care consult   Outcome: Progressing     Problem: MOBILITY - ADULT  Goal: Maintain or return to baseline ADL function  Description: INTERVENTIONS:  -  Assess patient's ability to carry out ADLs; assess patient's baseline for ADL function and identify physical deficits which impact ability to perform ADLs (bathing, care of mouth/teeth, toileting, grooming, dressing, etc )  - Assess/evaluate cause of self-care deficits   - Assess range of motion  - Assess patient's mobility; develop plan if impaired  - Assess patient's need for assistive devices and provide as appropriate  - Encourage maximum independence but intervene and supervise when necessary  - Involve family in performance of ADLs  - Assess for home care needs following discharge   - Consider OT consult to assist with ADL evaluation and planning for discharge  - Provide patient education as appropriate  Outcome: Progressing  Goal: Maintains/Returns to pre admission functional level  Description: INTERVENTIONS:  - Perform BMAT or MOVE assessment daily    - Set and communicate daily mobility goal to care team and patient/family/caregiver     - Collaborate with rehabilitation services on mobility goals if consulted  - Out of bed for toileting  - Record patient progress and toleration of activity level   Outcome: Progressing     Problem: Potential for Falls  Goal: Patient will remain free of falls  Description: INTERVENTIONS:  - Educate patient/family on patient safety including physical limitations  - Instruct patient to call for assistance with activity   - Consult OT/PT to assist with strengthening/mobility   - Keep Call bell within reach  - Keep bed low and locked with side rails adjusted as appropriate  - Keep care items and personal belongings within reach  - Initiate and maintain comfort rounds  - Make Fall Risk Sign visible to staff  - Apply yellow socks and bracelet for high fall risk patients  - Consider moving patient to room near nurses station  Outcome: Progressing

## 2022-12-12 NOTE — CASE MANAGEMENT
Case Management Discharge Planning Note    Patient name Rd Quintanilla  Location [facility] 387-33 MRN [id number]  : 1965 Date 2022       Current Admission Date: 2022  Current Admission Diagnosis:Malignant neoplasm of transverse colon [facility])   Patient Active Problem List    Diagnosis Date Noted   • Flash pulmonary edema ([address] ) 2022   • MR (mitral regurgitation) 2022   • Bacteremia 2022   • ESBL (extended spectrum beta-lactamase) producing bacteria infection 2022   • Acute respiratory failure with hypoxia ([facility] ) 2022   • Encephalopathy 2022   • Cervical radiculopathy 10/26/2022   • Other fatigue 06/15/2022   • Colostomy prolapse ([facility] ) 2022   • Colon cancer metastasized to liver ([facility] ) 2022   • 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 liver ([facility] ) 2022   • Iron deficiency anemia, unspecified 2022   • Malignant neoplasm of transverse colon ([facility] ) 2022   • Thrombocytosis 2022   • Hypokalemia 2022   • Transaminitis 2022   • Type 2 diabetes mellitus with hyperlipidemia ([facility] ) 2022   • Colonic mass 2022   • Microcytic anemia 2022   • Left ureteral calculus 2020   • Incarcerated umbilical hernia    • Testicular hypogonadism 2017   • Low testosterone 2017   • Type 2 diabetes mellitus without complication, with long-term current use of insulin ([facility] ) 2016   • Benign essential hypertension 2016   • Mixed hyperlipidemia 2016   • Erectile dysfunction 2016   • Obesity (BMI 30-39 9) 2016      LOS (days): 5  Geometric Mean LOS (GMLOS) (days):   Days to GMLOS:     OBJECTIVE:  Risk of Unplanned Readmission Score: 29 94         Current admission status: Inpatient   Preferred Pharmacy:   [facility]/pharmacy #[id number] [facility] Debera Hernandez[address]  [address] [place] [address]  Phone: [phone] Fax: 411 [address]  [address]  [address]  [address] [address]  Phone: [phone] Fax: [phone]    [id number]- [facility] [place], [place] - [address] [facility] [place] [address]  Phone: [phone] Fax: 209 [address], [address] [address] [facility] [address] [address]  Phone: [phone] Fax: [phone]    Primary Care Provider: Ana Senior MD    Primary Insurance: [employer]  Secondary Insurance:     DISCHARGE DETAILS:      Other Referral/Resources/Interventions Provided:  Referral Comments: JIMENA spoke with Jenna from [address] [place] [address] who continues to follow case at this time  Updated her on status of patient  Additional Comments: JIMENA did speak with provider  patient is not medically cleared for discharge at this time  Patient continues to be monitored at this time

## 2022-12-12 NOTE — SEDATION DOCUMENTATION
Cholecystostomy tube change completed By Katherine Roper, Neptali Redding  Patient tolerated well  Report given to bedside RN

## 2022-12-12 NOTE — RESTORATIVE TECHNICIAN NOTE
Restorative Technician Note      Patient Name: Annamarie Iniguez     Restorative Tech Visit Date: 12/12/22  Note Type: Mobility  Patient Position Upon Consult: Bedside chair  Activity Performed: Other (Comment); Transferred (pt completed stand-pivot transfer to trnasport stretcher)  Assistive Device: Other (Comment) KHANG LIN )  Patient Position at End of Consult: Supine; All needs within reach;  Other (comment) (pt in supine on transport stretcher; left in the care of transport team)        Amanuel Hanna

## 2022-12-12 NOTE — PROGRESS NOTES
[address] PROGRESS NOTE   Bevely Guardian 61 y o  male MRN: [id number]  Unit/Bed#: [facility] 906-01 Encounter: [id number]  Reason for Consult: ALEXY    ASSESSMENT and PLAN:  1  Acute kidney injury:  · Admission creatinine of 1 16 on 12/4/22  · ALEXY due to ATN from contrast nephropathy  (CT with IV contrast on 12/8/22)  · Peak creatinine: 2 10 on 12/11/22  · Urine output improving  · Creatinine down to 1 95 today  2  Chronic kidney disease, stage III:  · Baseline creatinine around 1 2-1 4   · No prior renal follow-up  3  Hypertension:  · BP controlled  · Current medications: Amlodipine 5 mg BID  · Hold off on lisinopril  · No changes today  4  Metabolic acidosis:  · CO2 up to 25 today  · Stop sodium bicarbonate  5  Anemia:  · Per primary team     6  Metastatic colon cancer:  · S/p transverse colectomy, takedown of colostomy, diverting loop ileostomy and right hemicolectomy  · Now with intra-abdominal abscess s/p IR drain placement  DISPOSITION:  · Creatinine appears to have reached a plateau  · Monitor off IVF  · Stop sodium bicarbonate  · Trend creatinine    SUBJECTIVE / 24H INTERVAL HISTORY:  Appetite is a little better  No chest pain or shortness of breath      OBJECTIVE:  Current Weight: Weight - Scale: 108 kg (238 lb 8 6 oz)  Vitals:    12/11/22 1840 12/11/22 2112 12/12/22 0520 12/12/22 0747   BP: 145/76 153/83  135/76   Pulse: 89 90  85   Resp: 20 18     Temp: (!) 97 2 °F (36 2 °C) (!) 97 3 °F (36 3 °C)  (!) 96 8 °F (36 °C)   TempSrc:       SpO2: 96% 95%  95%   Weight:   108 kg (238 lb 8 6 oz)    Height:           Intake/Output Summary (Last 24 hours) at 12/12/2022 0931  Last data filed at 12/12/2022 0901  Gross per 24 hour   Intake 1440 ml   Output 3950 ml   Net -2510 ml     General: conscious, cooperative, no distress  Skin: dry  Eyes: pink conjunctivae  ENT: moist mucous membranes  Respiratory: equal chest expansion, decreased in bases   Cardiovascular: distinct heart sounds, normal rate, regular rhythm  Abdomen: soft, (+) midline dressing, (+) ileostomy, (+) drain x 2 in RUQ and LUQ  Extremities: (+) mild LE edema  Genitourinary: no rivero catheter  Neuro: awake, alert     Psych: appropriate affect    Medications:    Current Facility-Administered Medications:   •  acetaminophen (TYLENOL) tablet 975 mg, 975 mg, Oral, Q8H [address], Js Payan MD, 975 mg at 12/12/22 0516  •  amLODIPine (NORVASC) tablet 5 mg, 5 mg, Oral, BID, Js Payan MD, 5 mg at 12/12/22 5581  •  aspirin chewable tablet 81 mg, 81 mg, Oral, Daily, Js Payan MD, 81 mg at 12/12/22 1465  •  atorvastatin (LIPITOR) tablet 40 mg, 40 mg, Oral, Daily, Js Payan MD, 40 mg at 12/12/22 3953  •  calcium carbonate (TUMS) chewable tablet 500 mg, 500 mg, Oral, BID PRN, Savage Mo MD, 500 mg at 12/11/22 2226  •  cholecalciferol (VITAMIN D3) tablet 400 Units, 400 Units, Oral, Daily, Js Payan MD, 400 Units at 12/12/22 0855  •  heparin (porcine) subcutaneous injection 5,000 Units, 5,000 Units, Subcutaneous, Q8H [address], Maggy Villatoro PA-C, 5,000 Units at 12/12/22 0516  •  HYDROmorphone (DILAUDID) injection 0 5 mg, 0 5 mg, Intravenous, Q3H PRN, Savage Mo MD, 0 5 mg at 12/06/22 0447  •  insulin glargine (LANTUS) subcutaneous injection 15 Units 0 15 mL, 15 Units, Subcutaneous, HS, Js Payan MD, 15 Units at 12/11/22 2230  •  insulin lispro (HumaLOG) 100 units/mL subcutaneous injection 1-5 Units, 1-5 Units, Subcutaneous, HS, Dineshatomizaid Wood MD, 1 Units at 12/11/22 2230  •  insulin lispro (HumaLOG) 100 units/mL subcutaneous injection 1-6 Units, 1-6 Units, Subcutaneous, TID AC, 1 Units at 12/11/22 1845 **AND** Fingerstick Glucose (POCT), , , TID AC, Richard Arguelles MD  •  insulin lispro (HumaLOG) 100 units/mL subcutaneous injection 6 Units, 6 Units, Subcutaneous, TID With Meals, Richard Wood MD, 6 Units at 12/12/22 0856  •  lidocaine (PF) (XYLOCAINE-MPF) 1 % injection, , , PRN, Rylan Henao MD, 10 mL at 12/08/22 1858  •  methocarbamol (ROBAXIN) tablet 500 mg, 500 mg, Oral, BID, Roderick Joseph MD, 500 mg at 12/12/22 7374  •  metoprolol (LOPRESSOR) injection 5 mg, 5 mg, Intravenous, Q8H PRN, Maggy Villatoro PA-C, 5 mg at 12/08/22 0508  •  multivitamin-minerals (CENTRUM) tablet 1 tablet, 1 tablet, Oral, Daily, Roderick Joseph MD, 1 tablet at 12/12/22 0855  •  ondansetron (ZOFRAN-ODT) dispersible tablet 4 mg, 4 mg, Oral, Q6H PRN, Roderick Joseph MD, 4 mg at 12/08/22 0440  •  oxyCODONE (ROXICODONE) immediate release tablet 10 mg, 10 mg, Oral, Q6H PRN, Roderick Joseph MD, 10 mg at 12/11/22 2227  •  oxyCODONE (ROXICODONE) IR tablet 5 mg, 5 mg, Oral, Q6H PRN, Roderick Joseph MD, 5 mg at 12/07/22 1806  •  pantoprazole (PROTONIX) EC tablet 40 mg, 40 mg, Oral, BID AC, Roderick Joseph MD, 40 mg at 12/12/22 0858  •  piperacillin-tazobactam (ZOSYN) 4 5 g in sodium chloride 0 9 % 100 mL IVPB, 4 5 g, Intravenous, Q6H, Mary Denney MD, Last Rate: 200 mL/hr at 12/12/22 0316, 4 5 g at 12/12/22 0316  •  sodium bicarbonate tablet 650 mg, 650 mg, Oral, BID after meals, Rhea Carrasco MD, 650 mg at 12/12/22 3379  •  tamsulosin (FLOMAX) capsule 0 4 mg, 0 4 mg, Oral, Daily With Rustam Ramirez MD, 0 4 mg at 12/11/22 1859    Laboratory Results:  Results from last 7 days   Lab Units 12/12/22  0509 12/11/22  2126 12/11/22  1051 12/11/22  0648 12/10/22  1232 12/09/22  1635 12/09/22  0541 12/08/22  0440 12/06/22  1018 12/06/22  0037   WBC Thousand/uL 13 22*  --  13 09* 12 67*  --   --  21 51* 28 29*  --  10 53*   HEMOGLOBIN g/dL 8 0* 7 4* 6 6* 6 4*  --   --  8 2* 9 2*  --  10 5*   HEMATOCRIT % 25 5* 23 6* 20 9* 20 3*  --   --  25 9* 29 2*  --  33 6*   PLATELETS Thousands/uL 293  --  297 296  --   --  416* 416*  --  361   POTASSIUM mmol/L 3 5  --   --  3 6 3 9 5 0 4 4  --  4 1 3 8   CHLORIDE mmol/L 107  --   --  106 107 109* 106  --  110* 110* CO2 mmol/L 25  --   --  21 20* 16* 19*  --  18* 20*   BUN mg/dL 22  --   --  26* 27* 25 22  --  18 18   CREATININE mg/dL 1 95*  --   --  2 10* 1 94* 1 71* 1 46*  --  1 25 1 33*   CALCIUM mg/dL 8 3  --   --  8 0* 7 8* 7 9* 8 0*  --  8 5 8 2*   MAGNESIUM mg/dL  --   --   --  1 9  --   --  1 8  --   --   --    PHOSPHORUS mg/dL  --   --   --  3 9  --   --  4 6*  --  3 5  --

## 2022-12-13 LAB
ANION GAP SERPL CALCULATED.3IONS-SCNC: 8 MMOL/L (ref 4–13)
ANISOCYTOSIS BLD QL SMEAR: PRESENT
ATRIAL RATE: 78 BPM
BACTERIA BLD CULT: NORMAL
BACTERIA BLD CULT: NORMAL
BASOPHILS # BLD MANUAL: 0 THOUSAND/UL (ref 0–0.1)
BASOPHILS NFR MAR MANUAL: 0 % (ref 0–1)
BUN SERPL-MCNC: 18 MG/DL (ref 5–25)
CALCIUM SERPL-MCNC: 8.8 MG/DL (ref 8.3–10.1)
CHLORIDE SERPL-SCNC: 105 MMOL/L (ref 96–108)
CO2 SERPL-SCNC: 24 MMOL/L (ref 21–32)
CREAT SERPL-MCNC: 1.62 MG/DL (ref 0.6–1.3)
EOSINOPHIL # BLD MANUAL: 0.43 THOUSAND/UL (ref 0–0.4)
EOSINOPHIL NFR BLD MANUAL: 3 % (ref 0–6)
ERYTHROCYTE [DISTWIDTH] IN BLOOD BY AUTOMATED COUNT: 17.9 % (ref 11.6–15.1)
FLUAV RNA RESP QL NAA+PROBE: NEGATIVE
FLUBV RNA RESP QL NAA+PROBE: NEGATIVE
GFR SERPL CREATININE-BSD FRML MDRD: 46 ML/MIN/1.73SQ M
GLUCOSE SERPL-MCNC: 107 MG/DL (ref 65–140)
GLUCOSE SERPL-MCNC: 111 MG/DL (ref 65–140)
GLUCOSE SERPL-MCNC: 166 MG/DL (ref 65–140)
GLUCOSE SERPL-MCNC: 70 MG/DL (ref 65–140)
GLUCOSE SERPL-MCNC: 86 MG/DL (ref 65–140)
GLUCOSE SERPL-MCNC: 86 MG/DL (ref 65–140)
HCT VFR BLD AUTO: 27.5 % (ref 36.5–49.3)
HGB BLD-MCNC: 8.6 G/DL (ref 12–17)
LYMPHOCYTES # BLD AUTO: 1.01 THOUSAND/UL (ref 0.6–4.47)
LYMPHOCYTES # BLD AUTO: 7 % (ref 14–44)
MACROCYTES BLD QL AUTO: PRESENT
MCH RBC QN AUTO: 26.1 PG (ref 26.8–34.3)
MCHC RBC AUTO-ENTMCNC: 31.3 G/DL (ref 31.4–37.4)
MCV RBC AUTO: 83 FL (ref 82–98)
MONOCYTES # BLD AUTO: 0.29 THOUSAND/UL (ref 0–1.22)
MONOCYTES NFR BLD: 2 % (ref 4–12)
NEUTROPHILS # BLD MANUAL: 12.5 THOUSAND/UL (ref 1.85–7.62)
NEUTS SEG NFR BLD AUTO: 87 % (ref 43–75)
P AXIS: 34 DEGREES
PLATELET # BLD AUTO: 335 THOUSANDS/UL (ref 149–390)
PLATELET BLD QL SMEAR: ABNORMAL
PMV BLD AUTO: 9.7 FL (ref 8.9–12.7)
POTASSIUM SERPL-SCNC: 3.9 MMOL/L (ref 3.5–5.3)
PR INTERVAL: 190 MS
PROMYELOCYTES NFR BLD MANUAL: 1 % (ref 0–0)
QRS AXIS: -3 DEGREES
QRSD INTERVAL: 144 MS
QT INTERVAL: 436 MS
QTC INTERVAL: 497 MS
RBC # BLD AUTO: 3.3 MILLION/UL (ref 3.88–5.62)
RBC MORPH BLD: PRESENT
RSV RNA RESP QL NAA+PROBE: NEGATIVE
SARS-COV-2 RNA RESP QL NAA+PROBE: NEGATIVE
SODIUM SERPL-SCNC: 137 MMOL/L (ref 135–147)
T WAVE AXIS: 7 DEGREES
VENTRICULAR RATE: 78 BPM
WBC # BLD AUTO: 14.37 THOUSAND/UL (ref 4.31–10.16)

## 2022-12-13 RX ADMIN — PANTOPRAZOLE SODIUM 40 MG: 40 TABLET, DELAYED RELEASE ORAL at 08:49

## 2022-12-13 RX ADMIN — ACETAMINOPHEN 975 MG: 325 TABLET ORAL at 05:00

## 2022-12-13 RX ADMIN — INSULIN LISPRO 1 UNITS: 100 INJECTION, SOLUTION INTRAVENOUS; SUBCUTANEOUS at 16:17

## 2022-12-13 RX ADMIN — AMLODIPINE BESYLATE 5 MG: 5 TABLET ORAL at 16:15

## 2022-12-13 RX ADMIN — Medication 1 TABLET: at 08:49

## 2022-12-13 RX ADMIN — INSULIN LISPRO 6 UNITS: 100 INJECTION, SOLUTION INTRAVENOUS; SUBCUTANEOUS at 16:18

## 2022-12-13 RX ADMIN — HEPARIN SODIUM 5000 UNITS: 5000 INJECTION INTRAVENOUS; SUBCUTANEOUS at 05:00

## 2022-12-13 RX ADMIN — METHOCARBAMOL TABLETS 500 MG: 500 TABLET, COATED ORAL at 08:49

## 2022-12-13 RX ADMIN — INSULIN LISPRO 6 UNITS: 100 INJECTION, SOLUTION INTRAVENOUS; SUBCUTANEOUS at 12:49

## 2022-12-13 RX ADMIN — OXYCODONE HYDROCHLORIDE 10 MG: 10 TABLET ORAL at 05:00

## 2022-12-13 RX ADMIN — HEPARIN SODIUM 5000 UNITS: 5000 INJECTION INTRAVENOUS; SUBCUTANEOUS at 14:11

## 2022-12-13 RX ADMIN — CHOLECALCIFEROL TAB 10 MCG (400 UNIT) 400 UNITS: 10 TAB at 08:49

## 2022-12-13 RX ADMIN — INSULIN GLARGINE 15 UNITS: 100 INJECTION, SOLUTION SUBCUTANEOUS at 21:11

## 2022-12-13 RX ADMIN — AMLODIPINE BESYLATE 5 MG: 5 TABLET ORAL at 08:49

## 2022-12-13 RX ADMIN — ASPIRIN 81 MG CHEWABLE TABLET 81 MG: 81 TABLET CHEWABLE at 08:49

## 2022-12-13 RX ADMIN — METHOCARBAMOL TABLETS 500 MG: 500 TABLET, COATED ORAL at 16:15

## 2022-12-13 RX ADMIN — PANTOPRAZOLE SODIUM 40 MG: 40 TABLET, DELAYED RELEASE ORAL at 16:15

## 2022-12-13 RX ADMIN — ERTAPENEM SODIUM 1000 MG: 1 INJECTION, POWDER, LYOPHILIZED, FOR SOLUTION INTRAMUSCULAR; INTRAVENOUS at 12:11

## 2022-12-13 RX ADMIN — ACETAMINOPHEN 975 MG: 325 TABLET ORAL at 14:10

## 2022-12-13 RX ADMIN — OXYCODONE HYDROCHLORIDE 10 MG: 10 TABLET ORAL at 19:40

## 2022-12-13 RX ADMIN — TAMSULOSIN HYDROCHLORIDE 0.4 MG: 0.4 CAPSULE ORAL at 16:15

## 2022-12-13 RX ADMIN — HEPARIN SODIUM 5000 UNITS: 5000 INJECTION INTRAVENOUS; SUBCUTANEOUS at 21:11

## 2022-12-13 RX ADMIN — ATORVASTATIN CALCIUM 40 MG: 40 TABLET, FILM COATED ORAL at 08:50

## 2022-12-13 RX ADMIN — ACETAMINOPHEN 975 MG: 325 TABLET ORAL at 21:11

## 2022-12-13 NOTE — PLAN OF CARE
Problem: PHYSICAL THERAPY ADULT  Goal: Performs mobility at highest level of function for planned discharge setting  See evaluation for individualized goals  Description: Treatment/Interventions: Functional transfer training, LE strengthening/ROM, Elevations, Therapeutic exercise, Endurance training, Equipment eval/education, Bed mobility, Gait training, Spoke to nursing, OT  Equipment Recommended: Marciano Dillon       See flowsheet documentation for full assessment, interventions and recommendations  12/13/2022 1457 by Myrtle Gilbert  Outcome: Progressing  Note: Prognosis: Good  Problem List: Decreased strength, Decreased endurance, Impaired balance, Decreased mobility, Decreased coordination  Assessment: PT session limited due to pts reported tiredness/dizziness  Pt is making slow progress continuing to require Mod (A) x1 for bed mobility and ambulation with RW and Mod (A) x2 with transfers  Pt has limited endurance at this time demonstrated by only being able to ambulate short distances  Therapeutic exercises were introduced this session to educate on HEP and work on LE strength  Pt tolerated exercises well with NAD  Pt is recommended for continued skilled therapy while in the hospital to increase functional independence and rehab is recommended upon D/C when medically cleared; will follow  Barriers to Discharge: Inaccessible home environment     PT Discharge Recommendation: Post acute rehabilitation services    See flowsheet documentation for full assessment

## 2022-12-13 NOTE — MALNUTRITION/BMI
This medical record reflects one or more clinical indicators suggestive of malnutrition   Malnutrition Findings:   Adult Malnutrition type: Chronic illness  Adult Degree of Malnutrition: Other severe protein calorie malnutrition  Malnutrition Characteristics: Inadequate energy, Weight loss                  360 Statement: malnutrition related to medical condition; evidenced by overall 18 6% weight loss since 2/18/22 weight of 293#; 8 3% weight loss since 11/10/22 weight of 260# to current weight of 236#; energy intake meeting </= 75% estimated needs >/= 1 month; Treatment: Oral diet as rx; patient declines oral nutrition supplements at this time    BMI Findings: Body mass index is 35 91 kg/m²  See Nutrition note dated 12/13/22 for additional details  Completed nutrition assessment is viewable in the nutrition documentation

## 2022-12-13 NOTE — PROGRESS NOTES
Progress Note - Infectious Disease   Judy Cervantes 61 y o  male MRN: [id number]  Unit/Bed#: [facility] 906-01 Encounter: [id number]      Impression/Plan:  1   Sepsis- evolving since admission   Leukocytosis and tachycardia  Appears to be secondary to recurrent intra-abdominal abscesses  Consideration for the possibility of bacteremia once again   Fortunately the patient remained hemodynamically stable despite his systemic illness    White cell count slightly increased   -Continue ertapenem through 12/15/2022 to complete 7 days from the drainage  -Follow-up blood cultures   -Source control measures as below  -Recheck CBC with differential and CMP  -Supportive care  -If WBC count would continue to rise, recommend repeat CT abdomen pelvis     2   Intra-abdominal abscesses-in the setting of a recent complex surgical history including resection of segments of the liver, transverse colon resection, intra-abdominal abscesses requiring drainage and ileostomy   Patient now with possible leak into the region of the previous liver resections and 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 with succus drainage of the wound   Patient now status post VICTORINO drain placement with purulent fluid sent for culture and a drain remaining in place  -Antibiotics as above  -Follow-up cultures and adjust antibiotics needed  -Drain management  -Close surgical follow-up  -Plan antibiotics through 12/15/2022 which will complete 7 days from the drainage  -If WBC count continues to rise recommend repeat CT abdomen pelvis     3   Possible acute cholecystitis-although not much in the way of abdominal tenderness in the right upper quadrant   Patient status post percutaneous cholecystotomy drain  -Drain management  -Antibiotics as above     4   Recent anastomotic leak with intra-abdominal abscess/infected hematoma- status post exploratory laparotomy with drainage of the abscess and cecal resection with wound culture growing ESBL E  Coli  -Antibiotics as above  -Source control measures as above     5   Recent ESBL E  coli bacteremia-as a complication of the recent anastomotic leak with intra-abdominal abscess formation   Patient received a course of ertapenem for 7 days postoperatively   -Antibiotics as above for now  -Follow-up blood cultures     6   Diabetes mellitus-type II    Discussed the above management plan with the primary service    Antibiotics:  Ertapenem 2  Post drain placement 5    Subjective:  Patient has no fever, chills, sweats; no nausea, vomiting, diarrhea; no cough, shortness of breath; no increased pain  No new symptoms  Objective:  Vitals:  Temp:  [97 °F (36 1 °C)] 97 °F (36 1 °C)  HR:  [84-98] 84  Resp:  [20] 20  BP: (135-165)/(76-86) 161/86  SpO2:  [95 %-96 %] 95 %  Temp (24hrs), Av °F (36 1 °C), Min:97 °F (36 1 °C), Max:97 °F (36 1 °C)  Current: Temperature: (!) 97 °F (36 1 °C)    Physical Exam:   General Appearance:  Alert, interactive, nontoxic, no acute distress  Throat: Oropharynx moist without lesions  Lungs:   Clear to auscultation bilaterally; no wheezes, rhonchi or rales; respirations unlabored   Heart:  RRR; no murmur, rub or gallop   Abdomen:   Soft, non-tender, non-distended, positive bowel sounds  Right upper quadrant biliary drain with output  VICTORINO drain in place   Extremities: No clubbing, cyanosis or edema   Skin: No new rashes or lesions  No draining wounds noted         Labs, Imaging, & Other studies:   All pertinent labs and imaging studies were personally reviewed  Results from last 7 days   Lab Units 22  0522  0509 22  2126 22  1051   WBC Thousand/uL 14 37* 13 22*  --  13 09*   HEMOGLOBIN g/dL 8 6* 8 0* 7 4* 6 6*   PLATELETS Thousands/uL 335 293  --  297     Results from last 7 days   Lab Units 22  0523 22  0509 22  0648 12/10/22  1232   SODIUM mmol/L 137 138 137 137   POTASSIUM mmol/L 3 9 3 5 3 6 3 9   CHLORIDE mmol/L 105 107 106 107   CO2 mmol/L 24 25 21 20*   BUN mg/dL 18 22 26* 27*   CREATININE mg/dL 1 62* 1 95* 2 10* 1 94*   EGFR ml/min/1 73sq m 46 36 33 37   CALCIUM mg/dL 8 8 8 3 8 0* 7 8*   AST U/L  --   --  96* 122*   ALT U/L  --   --  32 37   ALK PHOS U/L  --   --  994* 961*     Results from last 7 days   Lab Units 12/08/22  1903 12/08/22  1836 12/08/22  1619 12/08/22  1618   BLOOD CULTURE   --   --  No Growth After 4 Days  No Growth After 4 Days     GRAM STAIN RESULT  4+ Gram negative rods*  No polys seen* No Polys or Bacteria seen  --   --    BODY FLUID CULTURE, STERILE  4+ Growth of Escherichia coli ESBL*  4+ Growth of No growth  --   --

## 2022-12-13 NOTE — PLAN OF CARE
Problem: PAIN - ADULT  Goal: Verbalizes/displays adequate comfort level or baseline comfort level  Description: Interventions:  - Encourage patient to monitor pain and request assistance  - Assess pain using appropriate pain scale  - Administer analgesics based on type and severity of pain and evaluate response  - Implement non-pharmacological measures as appropriate and evaluate response  - Consider cultural and social influences on pain and pain management  - Notify physician/advanced practitioner if interventions unsuccessful or patient reports new pain  Outcome: Progressing     Problem: INFECTION - ADULT  Goal: Absence or prevention of progression during hospitalization  Description: INTERVENTIONS:  - Assess and monitor for signs and symptoms of infection  - Monitor lab/diagnostic results  - Monitor all insertion sites, i e  indwelling lines, tubes, and drains  - Monitor endotracheal if appropriate and nasal secretions for changes in amount and color  - [place] appropriate cooling/warming therapies per order  - Administer medications as ordered  - Instruct and encourage patient and family to use good hand hygiene technique  - Identify and instruct in appropriate isolation precautions for identified infection/condition  Outcome: Progressing  Goal: Absence of fever/infection during neutropenic period  Description: INTERVENTIONS:  - Monitor WBC    Outcome: Progressing     Problem: SAFETY ADULT  Goal: Patient will remain free of falls  Description: INTERVENTIONS:  - Educate patient/family on patient safety including physical limitations  - Instruct patient to call for assistance with activity   - Consult OT/PT to assist with strengthening/mobility   - Keep Call bell within reach  - Keep bed low and locked with side rails adjusted as appropriate  - Keep care items and personal belongings within reach  - Initiate and maintain comfort rounds  - Make Fall Risk Sign visible to staff  - Offer Toileting every 2 Hours, in advance of need  - Apply yellow socks and bracelet for high fall risk patients  - Consider moving patient to room near nurses station  Outcome: Progressing  Goal: Maintain or return to baseline ADL function  Description: INTERVENTIONS:  -  Assess patient's ability to carry out ADLs; assess patient's baseline for ADL function and identify physical deficits which impact ability to perform ADLs (bathing, care of mouth/teeth, toileting, grooming, dressing, etc )  - Assess/evaluate cause of self-care deficits   - Assess range of motion  - Assess patient's mobility; develop plan if impaired  - Assess patient's need for assistive devices and provide as appropriate  - Encourage maximum independence but intervene and supervise when necessary  - Involve family in performance of ADLs  - Assess for home care needs following discharge   - Consider OT consult to assist with ADL evaluation and planning for discharge  - Provide patient education as appropriate  Outcome: Progressing  Goal: Maintains/Returns to pre admission functional level  Description: INTERVENTIONS:  - Perform BMAT or MOVE assessment daily    - Set and communicate daily mobility goal to care team and patient/family/caregiver  - Collaborate with rehabilitation services on mobility goals if consulted  - Perform Range of Motion 3 times a day  - Reposition patient every 2 hours    - Dangle patient 3 times a day  - Stand patient 3 times a day  - Ambulate patient 3 times a day  - Out of bed to chair 3 times a day   - Out of bed for meals 3 times a day  - Out of bed for toileting  - Record patient progress and toleration of activity level   Outcome: Progressing     Problem: DISCHARGE PLANNING  Goal: Discharge to home or other facility with appropriate resources  Description: INTERVENTIONS:  - Identify barriers to discharge w/patient and caregiver  - Arrange for needed discharge resources and transportation as appropriate  - Identify discharge learning needs (meds, wound care, etc )  - Arrange for interpretive services to assist at discharge as needed  - Refer to Case Management Department for coordinating discharge planning if the patient needs post-hospital services based on physician/advanced practitioner order or complex needs related to functional status, cognitive ability, or social support system  Outcome: Progressing     Problem: Knowledge Deficit  Goal: Patient/family/caregiver demonstrates understanding of disease process, treatment plan, medications, and discharge instructions  Description: Complete learning assessment and assess knowledge base    Interventions:  - Provide teaching at level of understanding  - Provide teaching via preferred learning methods  Outcome: Progressing     Problem: SKIN/TISSUE INTEGRITY - ADULT  Goal: Skin Integrity remains intact(Skin Breakdown Prevention)  Description: Assess:  -Perform Michael assessment every 24 hr  -Clean and moisturize skin every 12 hr  -Inspect skin when repositioning, toileting, and assisting with ADLS  -Assess extremities for adequate circulation and sensation     Bed Management:  -Have minimal linens on bed & keep smooth, unwrinkled  -Change linens as needed when moist or perspiring  -Avoid sitting or lying in one position for more than 2 hours while in bed  -Keep HOB at 30 degrees     Toileting:  -Offer bedside commode  -Assess for incontinence every 2 hr  -Use incontinent care products after each incontinent episode such as cleanser    Activity:  -Mobilize patient 3 times a day  -Encourage activity and walks on unit  -Encourage or provide ROM exercises   -Turn and reposition patient every 2 Hours  -Use appropriate equipment to lift or move patient in bed  -Instruct/ Assist with weight shifting every 2 when out of bed in chair  -Consider limitation of chair time 2 hour intervals    Skin Care:  -Avoid use of baby powder, tape, friction and shearing, hot water or constrictive clothing  -Relieve pressure over bony prominences using wedges/cushions  -Do not massage red bony areas    Next Steps:  -Teach patient strategies to minimize risks such as skin breakdown   -Consider consults to  interdisciplinary teams such as OT/PT  Outcome: Progressing  Goal: Incision(s), wounds(s) or drain site(s) healing without S/S of infection  Description: INTERVENTIONS  - Assess and document dressing, incision, wound bed, drain sites and surrounding tissue  - Provide patient and family education  - Perform skin care/dressing changes every 24 hr  Outcome: Progressing  Goal: Pressure injury heals and does not worsen  Description: Interventions:  - Implement low air loss mattress or specialty surface (Criteria met)  - Apply silicone foam dressing  - Instruct/assist with weight shifting every 30 minutes when in chair   - Limit chair time to 2 hour intervals  - Use special pressure reducing interventions such as cushion when in chair   - Apply fecal or urinary incontinence containment device   - Perform passive or active ROM every hour  - Turn and reposition patient & offload bony prominences every 2 hours   - Utilize friction reducing device or surface for transfers   - Consider nutrition services referral as needed  Outcome: Progressing     Problem: Prexisting or High Potential for Compromised Skin Integrity  Goal: Skin integrity is maintained or improved  Description: INTERVENTIONS:  - Identify patients at risk for skin breakdown  - Assess and monitor skin integrity  - Assess and monitor nutrition and hydration status  - Monitor labs   - Assess for incontinence   - Turn and reposition patient  - Assist with mobility/ambulation  - Relieve pressure over bony prominences  - Avoid friction and shearing  - Provide appropriate hygiene as needed including keeping skin clean and dry  - Evaluate need for skin moisturizer/barrier cream  - Collaborate with interdisciplinary team   - Patient/family teaching  - Consider wound care consult   Outcome: Progressing     Problem: MOBILITY - ADULT  Goal: Maintain or return to baseline ADL function  Description: INTERVENTIONS:  -  Assess patient's ability to carry out ADLs; assess patient's baseline for ADL function and identify physical deficits which impact ability to perform ADLs (bathing, care of mouth/teeth, toileting, grooming, dressing, etc )  - Assess/evaluate cause of self-care deficits   - Assess range of motion  - Assess patient's mobility; develop plan if impaired  - Assess patient's need for assistive devices and provide as appropriate  - Encourage maximum independence but intervene and supervise when necessary  - Involve family in performance of ADLs  - Assess for home care needs following discharge   - Consider OT consult to assist with ADL evaluation and planning for discharge  - Provide patient education as appropriate  Outcome: Progressing  Goal: Maintains/Returns to pre admission functional level  Description: INTERVENTIONS:  - Perform BMAT or MOVE assessment daily    - Set and communicate daily mobility goal to care team and patient/family/caregiver  - Collaborate with rehabilitation services on mobility goals if consulted  - Perform Range of Motion 3 times a day  - Reposition patient every 2 hours    - Dangle patient 3 times a day  - Stand patient 3 times a day  - Ambulate patient 3 times a day  - Out of bed to chair 3 times a day   - Out of bed for meals 3 times a day  - Out of bed for toileting  - Record patient progress and toleration of activity level   Outcome: Progressing     Problem: Potential for Falls  Goal: Patient will remain free of falls  Description: INTERVENTIONS:  - Educate patient/family on patient safety including physical limitations  - Instruct patient to call for assistance with activity   - Consult OT/PT to assist with strengthening/mobility   - Keep Call bell within reach  - Keep bed low and locked with side rails adjusted as appropriate  - Keep care items and personal belongings within reach  - Initiate and maintain comfort rounds  - Make Fall Risk Sign visible to staff  - Offer Toileting every 2 Hours, in advance of need  - Apply yellow socks and bracelet for high fall risk patients  - Consider moving patient to room near nurses station  Outcome: Progressing

## 2022-12-13 NOTE — PROGRESS NOTES
Progress Note - Surgical Oncology  Para Hector 61 y o  male MRN: [id number]  Unit/Bed#: [facility] 906-01 Encounter: [id number]      Objective: Patient not happy this morning  Has been waiting for pain medication since midnight  States that he is very uncomfortable in bed and needs to be pulled up  No nausea, no emesis  He is urinating well on his own and it is light diane urine  Patient states he did not eat much yesterday due to the activity where he had his percutaneous cholecystostomy tube repositioned  There is been no fevers  Denies chills  He is on ertapenem now  There has been no drainage from his midline incision on this morning's dressing  No drainage either from his right abdominal incision  His percutaneous cholecystostomy tube is draining dark bilious fluid, his IR drain left abdomen is still tannish in color  Patient continues on subcu heparin for DVT prophylaxis  His creatinine yesterday came down to 1 95 today's is pending  His white count is also down to 13 2  Case management is working with placement  1725 UA  40 percutaneous cholecystostomy tube  25 IR drain  500 ileostomy    Blood pressure 165/85, pulse 98, temperature (!) 97 °F (36 1 °C), resp  rate 20, height 5' 7 99" (1 727 m), weight 108 kg (238 lb 8 6 oz), SpO2 96 %  ,Body mass index is 36 28 kg/m²        Intake/Output Summary (Last 24 hours) at 12/13/2022 0513  Last data filed at 12/12/2022 2201  Gross per 24 hour   Intake 10 ml   Output 2240 ml   Net -2230 ml       Invasive Devices     Central Venous Catheter Line  Duration           Port A Cath 03/10/22 Right Chest 277 days          Peripheral Intravenous Line  Duration           Peripheral IV 12/11/22 Left Antecubital 1 day          Drain  Duration           Ileostomy LUQ 25 days    Abscess Drain Abdomen 4 days    Cholecystostomy Tube <1 day                Physical Exam:   Alert, awake, orientated x3, in discomfort  Abdomen: Soft, does not appear distended, midline dressing taken down and midline wound is clean and dry with absolutely no drainage, right abdominal wound packing removed and small piece of alginate silver placed, no drainage from the right abdominal wound, staples are intact, cholecystostomy tube right abdomen is to gravity drainage, dark bilious fluid, IR drain left upper abdomen is tannish and more liquid today, ileostomy draining thick and stool  Extremities: There is 1+ edema to the knees bilaterally, small amount of upper extremity edema bilaterally, no calf tenderness bilaterally    Lab, Imaging and other studies: Pending  VTE Pharmacologic Prophylaxis: Heparin  VTE Mechanical Prophylaxis: sequential compression device    Assessment:  Status post exploratory laparotomy with segment 3 liver resection, transverse colectomy, left colectomy, ileocolonic anastomosis, diverting loop ileostomy  History of diabetes type 2  Day #5 PTC placement  Day # 5 IR drain placement liver collection    Plan:  1  Continue infectious disease recommendation  2  Continue diabetic diet  3  Continue to work with PT OT  4  ?  Remove staples right abdominal wound  5  Continue to be out of bed and ambulating the halls  6  Continue with Glucerna for nutritional supplementation  7  Follow a m  labs  8    Continue subcu heparin for DVT prophylaxis

## 2022-12-13 NOTE — PLAN OF CARE
Problem: PAIN - ADULT  Goal: Verbalizes/displays adequate comfort level or baseline comfort level  Description: Interventions:  - Encourage patient to monitor pain and request assistance  - Assess pain using appropriate pain scale  - Administer analgesics based on type and severity of pain and evaluate response  - Implement non-pharmacological measures as appropriate and evaluate response  - Consider cultural and social influences on pain and pain management  - Notify physician/advanced practitioner if interventions unsuccessful or patient reports new pain  Outcome: Progressing     Problem: INFECTION - ADULT  Goal: Absence or prevention of progression during hospitalization  Description: INTERVENTIONS:  - Assess and monitor for signs and symptoms of infection  - Monitor lab/diagnostic results  - Monitor all insertion sites, i e  indwelling lines, tubes, and drains  - Monitor endotracheal if appropriate and nasal secretions for changes in amount and color  - [place] appropriate cooling/warming therapies per order  - Administer medications as ordered  - Instruct and encourage patient and family to use good hand hygiene technique  - Identify and instruct in appropriate isolation precautions for identified infection/condition  Outcome: Progressing  Goal: Absence of fever/infection during neutropenic period  Description: INTERVENTIONS:  - Monitor WBC    Outcome: Progressing     Problem: SAFETY ADULT  Goal: Patient will remain free of falls  Description: INTERVENTIONS:  - Educate patient/family on patient safety including physical limitations  - Instruct patient to call for assistance with activity   - Consult OT/PT to assist with strengthening/mobility   - Keep Call bell within reach  - Keep bed low and locked with side rails adjusted as appropriate  - Keep care items and personal belongings within reach  - Initiate and maintain comfort rounds  - Make Fall Risk Sign visible to staff  - Apply yellow socks and bracelet for high fall risk patients  - Consider moving patient to room near nurses station  Outcome: Progressing  Goal: Maintain or return to baseline ADL function  Description: INTERVENTIONS:  -  Assess patient's ability to carry out ADLs; assess patient's baseline for ADL function and identify physical deficits which impact ability to perform ADLs (bathing, care of mouth/teeth, toileting, grooming, dressing, etc )  - Assess/evaluate cause of self-care deficits   - Assess range of motion  - Assess patient's mobility; develop plan if impaired  - Assess patient's need for assistive devices and provide as appropriate  - Encourage maximum independence but intervene and supervise when necessary  - Involve family in performance of ADLs  - Assess for home care needs following discharge   - Consider OT consult to assist with ADL evaluation and planning for discharge  - Provide patient education as appropriate  Outcome: Progressing  Goal: Maintains/Returns to pre admission functional level  Description: INTERVENTIONS:  - Perform BMAT or MOVE assessment daily    - Set and communicate daily mobility goal to care team and patient/family/caregiver     - Collaborate with rehabilitation services on mobility goals if consulted  - Record patient progress and toleration of activity level   Outcome: Progressing     Problem: DISCHARGE PLANNING  Goal: Discharge to home or other facility with appropriate resources  Description: INTERVENTIONS:  - Identify barriers to discharge w/patient and caregiver  - Arrange for needed discharge resources and transportation as appropriate  - Identify discharge learning needs (meds, wound care, etc )  - Arrange for interpretive services to assist at discharge as needed  - Refer to Case Management Department for coordinating discharge planning if the patient needs post-hospital services based on physician/advanced practitioner order or complex needs related to functional status, cognitive ability, or social support system  Outcome: Progressing     Problem: Knowledge Deficit  Goal: Patient/family/caregiver demonstrates understanding of disease process, treatment plan, medications, and discharge instructions  Description: Complete learning assessment and assess knowledge base    Interventions:  - Provide teaching at level of understanding  - Provide teaching via preferred learning methods  Outcome: Progressing     Problem: SKIN/TISSUE INTEGRITY - ADULT  Goal: Skin Integrity remains intact(Skin Breakdown Prevention)  Description: Assess:  -Inspect skin when repositioning, toileting, and assisting with ADLS  -Assess extremities for adequate circulation and sensation     Bed Management:  -Have minimal linens on bed & keep smooth, unwrinkled  -Change linens as needed when moist or perspiring      Toileting:  -Offer bedside commode    Skin Care:  -Avoid use of baby powder, tape, friction and shearing, hot water or constrictive clothing  -Relieve pressure over bony prominences  -Do not massage red bony areas    Next Steps:  -Teach patient strategies to minimize risks   -Consider consults to  interdisciplinary teams  Outcome: Progressing  Goal: Incision(s), wounds(s) or drain site(s) healing without S/S of infection  Description: INTERVENTIONS  - Assess and document dressing, incision, wound bed, drain sites and surrounding tissue  - Provide patient and family education  - Perform skin care/dressing changes  Outcome: Progressing  Goal: Pressure injury heals and does not worsen  Description: Interventions:  - Implement low air loss mattress or specialty surface (Criteria met)  - Apply silicone foam dressing  - Apply fecal or urinary incontinence containment device   - Perform passive or active ROM   - Turn and reposition patient & offload bony prominences every 2 hours   - Utilize friction reducing device or surface for transfers   - Consider consults to  interdisciplinary teams   - Use incontinent care products after each incontinent episode   - Consider nutrition services referral as needed  Outcome: Progressing     Problem: Prexisting or High Potential for Compromised Skin Integrity  Goal: Skin integrity is maintained or improved  Description: INTERVENTIONS:  - Identify patients at risk for skin breakdown  - Assess and monitor skin integrity  - Assess and monitor nutrition and hydration status  - Monitor labs   - Assess for incontinence   - Turn and reposition patient  - Assist with mobility/ambulation  - Relieve pressure over bony prominences  - Avoid friction and shearing  - Provide appropriate hygiene as needed including keeping skin clean and dry  - Evaluate need for skin moisturizer/barrier cream  - Collaborate with interdisciplinary team   - Patient/family teaching  - Consider wound care consult   Outcome: Progressing     Problem: MOBILITY - ADULT  Goal: Maintain or return to baseline ADL function  Description: INTERVENTIONS:  -  Assess patient's ability to carry out ADLs; assess patient's baseline for ADL function and identify physical deficits which impact ability to perform ADLs (bathing, care of mouth/teeth, toileting, grooming, dressing, etc )  - Assess/evaluate cause of self-care deficits   - Assess range of motion  - Assess patient's mobility; develop plan if impaired  - Assess patient's need for assistive devices and provide as appropriate  - Encourage maximum independence but intervene and supervise when necessary  - Involve family in performance of ADLs  - Assess for home care needs following discharge   - Consider OT consult to assist with ADL evaluation and planning for discharge  - Provide patient education as appropriate  Outcome: Progressing  Goal: Maintains/Returns to pre admission functional level  Description: INTERVENTIONS:  - Perform BMAT or MOVE assessment daily    - Set and communicate daily mobility goal to care team and patient/family/caregiver     - Collaborate with rehabilitation services on mobility goals if consulted  - Record patient progress and toleration of activity level   Outcome: Progressing     Problem: Potential for Falls  Goal: Patient will remain free of falls  Description: INTERVENTIONS:  - Educate patient/family on patient safety including physical limitations  - Instruct patient to call for assistance with activity   - Consult OT/PT to assist with strengthening/mobility   - Keep Call bell within reach  - Keep bed low and locked with side rails adjusted as appropriate  - Keep care items and personal belongings within reach  - Initiate and maintain comfort rounds  - Make Fall Risk Sign visible to staff  - Apply yellow socks and bracelet for high fall risk patients  - Consider moving patient to room near nurses station  Outcome: Progressing

## 2022-12-13 NOTE — CASE MANAGEMENT
Case Management Discharge Planning Note    Patient name Karlee Moreau  Location [facility] 906/[facility] 719-08 MRN [id number]  : 1965 Date 2022       Current Admission Date: 2022  Current Admission Diagnosis:Malignant neoplasm of transverse colon [facility])   Patient Active Problem List    Diagnosis Date Noted   • Flash pulmonary edema ([address] ) 2022   • MR (mitral regurgitation) 2022   • Bacteremia 2022   • ESBL (extended spectrum beta-lactamase) producing bacteria infection 2022   • Acute respiratory failure with hypoxia ([facility] ) 2022   • Encephalopathy 2022   • Cervical radiculopathy 10/26/2022   • Other fatigue 06/15/2022   • Colostomy prolapse ([facility] ) 2022   • Colon cancer metastasized to liver ([facility] ) 2022   • 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 liver ([facility] ) 2022   • Iron deficiency anemia, unspecified 2022   • Malignant neoplasm of transverse colon ([facility] ) 2022   • Thrombocytosis 2022   • Hypokalemia 2022   • Transaminitis 2022   • Type 2 diabetes mellitus with hyperlipidemia ([facility] ) 2022   • Colonic mass 2022   • Microcytic anemia 2022   • Left ureteral calculus 2020   • Incarcerated umbilical hernia    • Testicular hypogonadism 2017   • Low testosterone 2017   • Type 2 diabetes mellitus without complication, with long-term current use of insulin ([facility] ) 2016   • Benign essential hypertension 2016   • Mixed hyperlipidemia 2016   • Erectile dysfunction 2016   • Obesity (BMI 30-39 9) 2016      LOS (days): 6  Geometric Mean LOS (GMLOS) (days):   Days to GMLOS:     OBJECTIVE:  Risk of Unplanned Readmission Score: 30 05         Current admission status: Inpatient   Preferred Pharmacy:   [facility]/pharmacy #[id number] [address]  [address] [place] [address]  Phone: [phone] Fax: [phone] [address]  [address]  [facility] 3  [address] [place] [address]  Phone: [phone] Fax: [phone]    [id number] ANJELICA COBOS - [address] [facility] [address]  Phone: [phone] Fax: [phone] [address] [facility] [address] [place] [address] [facility] [address] [address]  Phone: [phone] Fax: [phone]    Primary Care Provider: Nitza Bullard MD    Primary Insurance: [employer]  Secondary Insurance:     DISCHARGE DETAILS:          Additional Comments: CM spoke with provider  Patient continues to be monitored at this time and hopeful for d/c tomorrow  Jenna from [facility] made aware

## 2022-12-13 NOTE — PROGRESS NOTES
[address] [facility] PROGRESS NOTE   Sebastian Spain 61 y o  male MRN: [id number]  Unit/Bed#: [facility] 906-01 Encounter: [id number]  Reason for Consult: ALEXY    ASSESSMENT and PLAN:  1  Acute kidney injury:  · Admission creatinine of 1 16 on 12/4/22  · ALEXY due to ATN from contrast nephropathy  (CT with IV contrast on 12/8/22)  · Peak creatinine: 2 10 on 12/11/22  · Renal function improving - SCr down to 1 62 today  2  Chronic kidney disease, stage III:  · Baseline creatinine around 1 2-1 4   · No prior renal follow-up  3  Hypertension:  · BP was controlled but higher the past 2 readings  · Current medications: Amlodipine 5 mg BID  · Hold off on lisinopril  · Suspect high BP due to pain  · No changes today  4  Metabolic acidosis:  · Resolved  · Now off sodium bicarbonate  5  Anemia:  · Per primary team   · Hgb up to 8 6      6  Metastatic colon cancer:  · S/p transverse colectomy, takedown of colostomy, diverting loop ileostomy and right hemicolectomy  · Now with intra-abdominal abscess s/p IR drain placement  7  Sepsis  · Due to intraabdominal abscess, recent E Coli bacteremia  · On Ertapenem  DISPOSITION:  · Renal function improving  · Monitor off IVF  SUBJECTIVE / 24H INTERVAL HISTORY:  No CP or SOB   cc  Alvina tube repositioned yesterday       OBJECTIVE:  Current Weight: Weight - Scale: 108 kg (238 lb 8 6 oz)  Vitals:    12/12/22 0747 12/12/22 1740 12/12/22 2116 12/13/22 0738   BP: 135/76 135/76 165/85 161/86   Pulse: 85  98 84   Resp:   20    Temp: (!) 96 8 °F (36 °C)  (!) 97 °F (36 1 °C) (!) 97 °F (36 1 °C)   TempSrc:       SpO2: 95%  96% 95%   Weight:       Height:           Intake/Output Summary (Last 24 hours) at 12/13/2022 1049  Last data filed at 12/13/2022 0930  Gross per 24 hour   Intake 10 ml   Output 1815 ml   Net -1805 ml     General: conscious, cooperative, no distress  Skin: dry  Eyes: pink conjunctivae  ENT: moist mucous membranes  Respiratory: equal chest expansion, clear breath sounds anteriorly  Cardiovascular: distinct heart sounds, normal rate, regular rhythm, no rub  Abdomen: soft, (+) midline dressing, (+) ileostomy, (+) drains x 2  Extremities: mild LE edema  Genitourinary: no rivero catheter  Neuro: awake, alert     Psych: appropriate affect    Medications:    Current Facility-Administered Medications:   •  acetaminophen (TYLENOL) tablet 975 mg, 975 mg, Oral, Q8H [address], Dm Plascencia MD, 975 mg at 12/13/22 0500  •  amLODIPine (NORVASC) tablet 5 mg, 5 mg, Oral, BID, Dm Plascencia MD, 5 mg at 12/13/22 6474  •  aspirin chewable tablet 81 mg, 81 mg, Oral, Daily, Dm Plascencia MD, 81 mg at 12/13/22 6388  •  atorvastatin (LIPITOR) tablet 40 mg, 40 mg, Oral, Daily, Dm Plascencia MD, 40 mg at 12/13/22 8476  •  calcium carbonate (TUMS) chewable tablet 500 mg, 500 mg, Oral, BID PRN, Harsha Holm MD, 500 mg at 12/11/22 2226  •  cholecalciferol (VITAMIN D3) tablet 400 Units, 400 Units, Oral, Daily, Dm Plascencia MD, 400 Units at 12/13/22 0849  •  ertapenem (INVanz) 1,000 mg in sodium chloride 0 9 % 50 mL IVPB, 1,000 mg, Intravenous, Q24H, Dominguez Chapman MD, Last Rate: 100 mL/hr at 12/12/22 1250, 1,000 mg at 12/12/22 1250  •  heparin (porcine) subcutaneous injection 5,000 Units, 5,000 Units, Subcutaneous, Q8H [address], Maggy Villatoro PA-C, 5,000 Units at 12/13/22 0500  •  HYDROmorphone (DILAUDID) injection 0 5 mg, 0 5 mg, Intravenous, Q3H PRN, Harhsa Holm MD, 0 5 mg at 12/06/22 0447  •  insulin glargine (LANTUS) subcutaneous injection 15 Units 0 15 mL, 15 Units, Subcutaneous, HS, Dm Plascencia MD, 15 Units at 12/12/22 2215  •  insulin lispro (HumaLOG) 100 units/mL subcutaneous injection 1-5 Units, 1-5 Units, Subcutaneous, HS, Oluwatomisin Kristian Arguelles MD, 1 Units at 12/11/22 2230  •  insulin lispro (HumaLOG) 100 units/mL subcutaneous injection 1-6 Units, 1-6 Units, Subcutaneous, TID AC, 1 Units at 12/11/22 1845 **AND** Fingerstick Glucose (POCT), , , TID AC, Richard Sampson MD  •  insulin lispro (HumaLOG) 100 units/mL subcutaneous injection 6 Units, 6 Units, Subcutaneous, TID With Meals, Richard Sampson MD, 6 Units at 12/12/22 1742  •  methocarbamol (ROBAXIN) tablet 500 mg, 500 mg, Oral, BID, Chong Zapata MD, 500 mg at 12/13/22 0849  •  metoprolol (LOPRESSOR) injection 5 mg, 5 mg, Intravenous, Q8H PRN, Maggy Villatoro PA-C, 5 mg at 12/08/22 0508  •  multivitamin-minerals (CENTRUM) tablet 1 tablet, 1 tablet, Oral, Daily, Chong Zapata MD, 1 tablet at 12/13/22 0849  •  ondansetron (ZOFRAN-ODT) dispersible tablet 4 mg, 4 mg, Oral, Q6H PRN, Chong Zapata MD, 4 mg at 12/08/22 0440  •  oxyCODONE (ROXICODONE) immediate release tablet 10 mg, 10 mg, Oral, Q6H PRN, Chong Zapata MD, 10 mg at 12/13/22 0500  •  oxyCODONE (ROXICODONE) IR tablet 5 mg, 5 mg, Oral, Q6H PRN, Chong Zapata MD, 5 mg at 12/07/22 0209  •  pantoprazole (PROTONIX) EC tablet 40 mg, 40 mg, Oral, BID AC, Chong Zapata MD, 40 mg at 12/13/22 3731  •  tamsulosin [facility]) capsule 0 4 mg, 0 4 mg, Oral, Daily With Riky Quezada MD, 0 4 mg at 12/12/22 1740    Laboratory Results:  Results from last 7 days   Lab Units 12/13/22  0523 12/12/22  0509 12/11/22  2126 12/11/22  1051 12/11/22  0648 12/10/22  1232 12/09/22  1635 12/09/22  0541 12/08/22  0440   WBC Thousand/uL 14 37* 13 22*  --  13 09* 12 67*  --   --  21 51* 28 29*   HEMOGLOBIN g/dL 8 6* 8 0* 7 4* 6 6* 6 4*  --   --  8 2* 9 2*   HEMATOCRIT % 27 5* 25 5* 23 6* 20 9* 20 3*  --   --  25 9* 29 2*   PLATELETS Thousands/uL 335 293  --  297 296  --   --  416* 416*   POTASSIUM mmol/L 3 9 3 5  --   --  3 6 3 9 5 0 4 4  --    CHLORIDE mmol/L 105 107  --   --  106 107 109* 106  --    CO2 mmol/L 24 25  --   --  21 20* 16* 19*  --    BUN mg/dL 18 22  --   --  26* 27* 25 22  --    CREATININE mg/dL 1 62* 1 95*  --   --  2 10* 1 94* 1 71* 1 46*  --    CALCIUM mg/dL 8 8 8 3  --   --  8 0* 7  8* 7 9* 8 0*  --    MAGNESIUM mg/dL  --   --   --   --  1 9  --   --  1 8  --    PHOSPHORUS mg/dL  --   --   --   --  3 9  --   --  4 6*  --

## 2022-12-13 NOTE — PLAN OF CARE
Problem: OCCUPATIONAL THERAPY ADULT  Goal: Performs self-care activities at highest level of function for planned discharge setting  See evaluation for individualized goals  Description: Treatment Interventions: ADL retraining, Functional transfer training, Endurance training, UE strengthening/ROM, Cognitive reorientation, Patient/family training, Equipment evaluation/education, Fine motor coordination activities, Compensatory technique education, Continued evaluation, Activityengagement, Energy conservation          See flowsheet documentation for full assessment, interventions and recommendations  Outcome: Progressing  Note: Limitation: Decreased ADL status, Decreased UE ROM, Decreased UE strength, Decreased cognition, Decreased endurance, Decreased self-care trans, Decreased high-level ADLs, Decreased fine motor control  Prognosis: Good  Assessment: Patient participated in Skilled OT session this date with interventions consisting of ADL re training with the use of correct body mechnaics, Energy Conservation techniques, safety awareness and fall prevention techniques,  therapeutic activities to: increase activity tolerance, increase dynamic sit/ stand balance during functional activity  and increase OOB/ sitting tolerance   Upon arrival patient was found seated OOB to Chair  Pt demonstrated the following tasks: MOD A STS, S for hair care routine, and MIN A for socks  Pt with increased ability to doff/don R sock, increased A for L  Pt also engaged in FMC/pinching task  Patient continues to be functioning below baseline level, occupational performance remains limited secondary to factors listed above and increased risk for falls and injury  From OT standpoint, recommendation at time of d/c would be STR  Patient to benefit from continued Occupational Therapy treatment while in the hospital to address deficits as defined above and maximize level of functional independence with ADLs and functional mobility   Pt was left in chair with alarm on after session with all current needs met  The patient's raw score on the AM-PAC Daily Activity inpatient short form is 16, standardized score is 35 96, less than 39 4  Patients at this level are likely to benefit from discharge to post-acute rehabilitation services  Please refer to the recommendation of the Occupational Therapist for safe discharge planning       OT Discharge Recommendation: Post acute rehabilitation services

## 2022-12-13 NOTE — OCCUPATIONAL THERAPY NOTE
Occupational Therapy Progress Note     Patient Name: Nolvia Chaney  ARJTT'X Date: 12/13/2022  Problem List  Principal Problem:    Malignant neoplasm of transverse colon ([address] )          12/13/22 1354   OT Last Visit   OT Visit Date 12/13/22   Note Type   Note Type Treatment   Pain Assessment   Pain Assessment Tool 0-10   Pain Score 3   Pain Location/Orientation Location: Abdomen; Location: Incision   Hospital Pain Intervention(s) Repositioned; Ambulation/increased activity   Restrictions/Precautions   Other Precautions Chair Alarm; Bed Alarm;Multiple lines; Fall Risk;Pain;Telemetry   Lifestyle   Autonomy At baseline, pt was I with ADLs, IADLs, fnxl mobility, (+)    Reciprocal Relationships Wife, 2 kids   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   Intrinsic Gratification Yardwork   ADL   Where Assessed Chair   Grooming Assistance 5  Supervision/Setup   Grooming Deficit Brushing hair   Grooming Comments + washing hair   LB Dressing Assistance 4  Minimal Assistance   LB Dressing Deficit Don/doff L sock; Don/doff R sock   LB Dressing Comments increased A for L sock   Bed Mobility   Supine to Sit Unable to assess   Sit to Supine Unable to assess   Transfers   Sit to Stand 3  Moderate assistance   Additional items Assist x 1; Increased time required;Armrests   Stand to Sit 3  Moderate assistance   Additional items Assist x 1; Increased time required   Additional Comments transfers with RW   Cognition   Overall Cognitive Status [facility]   Arousal/Participation Responsive; Cooperative   Attention Attends with cues to redirect   Orientation Level Oriented X4   Following Commands Follows one step commands with increased time or repetition   Comments Pt pleasant and cooperative t/o session   Additional Activities   Additional Activities Comments Pt engaged in FMC/pinching tasks, noted to have difficulty performing thumb opposition with LUE, unable to perform thumb PIP flexion   Activity Tolerance   Activity Tolerance Patient limited by fatigue   Medical Staff Made Aware RN   Assessment   Assessment Patient participated in Skilled OT session this date with interventions consisting of ADL re training with the use of correct body mechnaics, Energy Conservation techniques, safety awareness and fall prevention techniques,  therapeutic activities to: increase activity tolerance, increase dynamic sit/ stand balance during functional activity  and increase OOB/ sitting tolerance   Upon arrival patient was found seated OOB to Chair  Pt demonstrated the following tasks: MOD A STS, S for hair care routine, and MIN A for socks  Pt with increased ability to doff/don R sock, increased A for L  Pt also engaged in FMC/pinching task  Patient continues to be functioning below baseline level, occupational performance remains limited secondary to factors listed above and increased risk for falls and injury  From OT standpoint, recommendation at time of d/c would be STR  Patient to benefit from continued Occupational Therapy treatment while in the hospital to address deficits as defined above and maximize level of functional independence with ADLs and functional mobility  Pt was left in chair with alarm on after session with all current needs met  The patient's raw score on the AM-PAC Daily Activity inpatient short form is 16, standardized score is 35 96, less than 39 4  Patients at this level are likely to benefit from discharge to post-acute rehabilitation services  Please refer to the recommendation of the Occupational Therapist for safe discharge planning  Plan   Treatment Interventions ADL retraining;Functional transfer training;UE strengthening/ROM; Endurance training;Patient/family training;Cognitive reorientation;Equipment evaluation/education; Fine motor coordination activities; Compensatory technique education;Continued evaluation; Energy conservation; Activityengagement   Goal Expiration Date 12/19/22   OT Treatment Day 2   OT Frequency (2-4x/wk)   Recommendation   OT Discharge Recommendation Post acute rehabilitation services   AM-PAC Daily Activity Inpatient   Lower Body Dressing 2   Bathing 2   Toileting 2   Upper Body Dressing 3   Grooming 3   Eating 4   Daily Activity Raw Score 16   Daily Activity Standardized Score (Calc for Raw Score >=11) 35 96   AM-PAC Applied Cognition Inpatient   Following a Speech/Presentation 3   Understanding Ordinary Conversation 4   Taking Medications 4   Remembering Where Things Are Placed or Put Away 4   Remembering List of 4-5 Errands 3   Taking Care of Complicated Tasks 3   Applied Cognition Raw Score 21   Applied Cognition Standardized Score 44 3     Juan F Rosa MS, OTR/L

## 2022-12-13 NOTE — PHYSICAL THERAPY NOTE
PHYSICAL THERAPY NOTE          Patient Name: Marguerite PORTILLO Date: 12/13/2022 12/13/22 1327   PT Last Visit   PT Visit Date 12/13/22   Note Type   Note Type Treatment   Pain Assessment   Pain Assessment Tool 0-10   Pain Score No Pain   Restrictions/Precautions   Other Precautions Chair Alarm;Multiple lines;Telemetry;O2;Fall Risk  (2 drains; chair alarm activated at end of session)   General   Chart Reviewed Yes   Additional Pertinent History cleared for tx session by nsg   Response to Previous Treatment Patient with no complaints from previous session  Cognition   Overall Cognitive Status WFL   Arousal/Participation Alert; Cooperative   Attention Within functional limits   Orientation Level Oriented to person;Oriented to place;Oriented to situation   Memory Within functional limits   Following Commands Follows one step commands without difficulty   Subjective   Subjective Pt alert in bed; agreed to ambulate with encourgement; reports feeling tired today   Bed Mobility   Supine to Sit 3  Moderate assistance   Additional items Assist x 1;HOB elevated; Increased time required;Verbal cues   Additional Comments upon sitting EOB pt reported feeling dizzy (/88)  pt sat at EOB for a couple minutes until sxs subsided  Transfers   Sit to Stand 3  Moderate assistance   Additional items Assist x 2; Increased time required;Verbal cues   Stand to Sit 3  Moderate assistance   Additional items Assist x 2; Increased time required;Verbal cues   Additional Comments with RW   Ambulation/Elevation   Gait pattern Inconsistent farhan; Foward flexed; Short stride; Excessively slow   Gait Assistance 3  Moderate assist   Additional items Assist x 1;Verbal cues   Assistive Device Rolling walker   Distance 8ft   Balance   Static Sitting Fair -   Dynamic Sitting Fair -   Static Standing Poor +   Dynamic Standing Poor   Ambulatory Poor Endurance Deficit   Endurance Deficit Yes   Endurance Deficit Description fatigue and weakness   Activity Tolerance   Activity Tolerance Patient limited by fatigue   Nurse Made Aware Spoke to RN   Exercises   Knee AROM Long Arc Quad Sitting;15 reps;AROM; Bilateral  (2 sets)   Ankle Pumps Sitting;15 reps;AROM; Bilateral  (2 sets)   Marching Sitting;10 reps;AROM; Bilateral   Assessment   Prognosis Good   Problem List Decreased strength;Decreased endurance; Impaired balance;Decreased mobility; Decreased coordination   Assessment PT session limited due to pts reported tiredness/dizziness  Pt is making slow progress continuing to require Mod (A) x1 for bed mobility and ambulation with RW and Mod (A) x2 with transfers  Pt has limited endurance at this time demonstrated by only being able to ambulate short distances  Therapeutic exercises were introduced this session to educate on HEP and work on LE strength  Pt tolerated exercises well with NAD  Pt is recommended for continued skilled therapy while in the hospital to increase functional independence and rehab is recommended upon D/C when medically cleared; will follow  Barriers to Discharge Inaccessible home environment   Goals   Patient Goals to get stronger   STG Expiration Date 12/18/22   PT Treatment Day 2   Plan   Treatment/Interventions Functional transfer training;LE strengthening/ROM; Elevations; Therapeutic exercise; Endurance training;Equipment eval/education; Bed mobility;Gait training;Spoke to nursing;OT   Progress Progressing toward goals   PT Frequency 3-5x/wk   Recommendation   PT Discharge Recommendation Post acute rehabilitation services   Equipment Recommended [address] [facility] Recommended Wheeled walker   AM-PAC Basic Mobility Inpatient   Turning in Bed Without Bedrails 2   Lying on Back to Sitting on Edge of Flat Bed 2   Moving Bed to Chair 2   Standing Up From Chair 2   Walk in Room 2   Climb 3-5 Stairs 1   Basic Mobility Inpatient Raw Score 11 Basic Mobility Standardized Score 30 25   Highest Level Of Mobility   -[facility] Goal 4: Move to chair/commode   -HLM Achieved 6: Walk 10 steps or more   Education   Education Provided Mobility training;Home exercise program;Assistive device   Patient Demonstrates acceptance/verbal understanding   End of Consult   Patient Position at End of Consult Bedside chair; All needs within reach;Bed/Chair alarm activated     [employer], SPT

## 2022-12-13 NOTE — QUICK NOTE
Staples removed from the right surgical wound  No packing needed in wound  Midline wound without any drainage

## 2022-12-14 ENCOUNTER — HOSPITAL ENCOUNTER (INPATIENT)
Facility: HOSPITAL | Age: 57
LOS: 34 days | Discharge: HOME HEALTH | End: 2023-01-17
Attending: STUDENT IN AN ORGANIZED HEALTH CARE EDUCATION/TRAINING PROGRAM | Admitting: STUDENT IN AN ORGANIZED HEALTH CARE EDUCATION/TRAINING PROGRAM

## 2022-12-14 ENCOUNTER — TRANSITIONAL CARE MANAGEMENT (OUTPATIENT)
Dept: INTERNAL MEDICINE CLINIC | Facility: CLINIC | Age: 57
End: 2022-12-14

## 2022-12-14 ENCOUNTER — APPOINTMENT (OUTPATIENT)
Dept: RADIOLOGY | Facility: HOSPITAL | Age: 57
End: 2022-12-14

## 2022-12-14 VITALS
HEIGHT: 68 IN | SYSTOLIC BLOOD PRESSURE: 151 MMHG | BODY MASS INDEX: 35.88 KG/M2 | HEART RATE: 98 BPM | OXYGEN SATURATION: 96 % | DIASTOLIC BLOOD PRESSURE: 86 MMHG | WEIGHT: 236.77 LBS | TEMPERATURE: 97.5 F | RESPIRATION RATE: 20 BRPM

## 2022-12-14 DIAGNOSIS — C78.7 COLON CANCER METASTASIZED TO LIVER (HCC): ICD-10-CM

## 2022-12-14 DIAGNOSIS — C18.9 COLON CANCER METASTASIZED TO LIVER (HCC): ICD-10-CM

## 2022-12-14 DIAGNOSIS — R74.8 ALKALINE PHOSPHATASE ELEVATION: ICD-10-CM

## 2022-12-14 DIAGNOSIS — T81.31XA DEHISCENCE OF OPERATIVE WOUND, INITIAL ENCOUNTER: ICD-10-CM

## 2022-12-14 DIAGNOSIS — L02.211 ABDOMINAL WALL ABSCESS: ICD-10-CM

## 2022-12-14 DIAGNOSIS — E43 SEVERE PROTEIN-CALORIE MALNUTRITION (HCC): ICD-10-CM

## 2022-12-14 DIAGNOSIS — R18.8 INTRA-ABDOMINAL FLUID COLLECTION: ICD-10-CM

## 2022-12-14 DIAGNOSIS — N20.1 LEFT URETERAL CALCULUS: ICD-10-CM

## 2022-12-14 DIAGNOSIS — E78.2 MIXED HYPERLIPIDEMIA: ICD-10-CM

## 2022-12-14 DIAGNOSIS — Z79.4 TYPE 2 DIABETES MELLITUS WITHOUT COMPLICATION, WITH LONG-TERM CURRENT USE OF INSULIN (HCC): Primary | ICD-10-CM

## 2022-12-14 DIAGNOSIS — R78.81 BACTEREMIA: ICD-10-CM

## 2022-12-14 DIAGNOSIS — L02.91 ABSCESS: ICD-10-CM

## 2022-12-14 DIAGNOSIS — K81.9 CHOLECYSTITIS: ICD-10-CM

## 2022-12-14 DIAGNOSIS — C18.4 MALIGNANT NEOPLASM OF TRANSVERSE COLON (HCC): ICD-10-CM

## 2022-12-14 DIAGNOSIS — E11.9 TYPE 2 DIABETES MELLITUS WITHOUT COMPLICATION, WITH LONG-TERM CURRENT USE OF INSULIN (HCC): Primary | ICD-10-CM

## 2022-12-14 DIAGNOSIS — E66.9 OBESITY (BMI 30-39.9): ICD-10-CM

## 2022-12-14 DIAGNOSIS — K63.89 COLONIC MASS: ICD-10-CM

## 2022-12-14 DIAGNOSIS — R52 ACUTE PAIN: ICD-10-CM

## 2022-12-14 DIAGNOSIS — K21.9 GASTROESOPHAGEAL REFLUX DISEASE WITHOUT ESOPHAGITIS: ICD-10-CM

## 2022-12-14 DIAGNOSIS — F32.A DEPRESSION, UNSPECIFIED DEPRESSION TYPE: ICD-10-CM

## 2022-12-14 DIAGNOSIS — D72.829 LEUKOCYTOSIS, UNSPECIFIED TYPE: ICD-10-CM

## 2022-12-14 DIAGNOSIS — T81.43XA POSTPROCEDURAL INTRAABDOMINAL ABSCESS: ICD-10-CM

## 2022-12-14 PROBLEM — N17.9 ACUTE KIDNEY INJURY (HCC): Status: ACTIVE | Noted: 2022-12-14

## 2022-12-14 LAB
2HR DELTA HS TROPONIN: -9 NG/L
ANION GAP SERPL CALCULATED.3IONS-SCNC: 5 MMOL/L (ref 4–13)
ANISOCYTOSIS BLD QL SMEAR: PRESENT
ATRIAL RATE: 89 BPM
BASOPHILS # BLD MANUAL: 0.12 THOUSAND/UL (ref 0–0.1)
BASOPHILS NFR MAR MANUAL: 1 % (ref 0–1)
BUN SERPL-MCNC: 16 MG/DL (ref 5–25)
CALCIUM SERPL-MCNC: 8.8 MG/DL (ref 8.3–10.1)
CARDIAC TROPONIN I PNL SERPL HS: 48 NG/L
CARDIAC TROPONIN I PNL SERPL HS: 57 NG/L
CHLORIDE SERPL-SCNC: 104 MMOL/L (ref 96–108)
CO2 SERPL-SCNC: 27 MMOL/L (ref 21–32)
CREAT SERPL-MCNC: 1.43 MG/DL (ref 0.6–1.3)
EOSINOPHIL # BLD MANUAL: 0.12 THOUSAND/UL (ref 0–0.4)
EOSINOPHIL NFR BLD MANUAL: 1 % (ref 0–6)
ERYTHROCYTE [DISTWIDTH] IN BLOOD BY AUTOMATED COUNT: 18 % (ref 11.6–15.1)
GFR SERPL CREATININE-BSD FRML MDRD: 53 ML/MIN/1.73SQ M
GLUCOSE SERPL-MCNC: 107 MG/DL (ref 65–140)
GLUCOSE SERPL-MCNC: 117 MG/DL (ref 65–140)
GLUCOSE SERPL-MCNC: 125 MG/DL (ref 65–140)
GLUCOSE SERPL-MCNC: 139 MG/DL (ref 65–140)
GLUCOSE SERPL-MCNC: 96 MG/DL (ref 65–140)
HCT VFR BLD AUTO: 26 % (ref 36.5–49.3)
HGB BLD-MCNC: 8 G/DL (ref 12–17)
HYPERCHROMIA BLD QL SMEAR: PRESENT
LYMPHOCYTES # BLD AUTO: 1.66 THOUSAND/UL (ref 0.6–4.47)
LYMPHOCYTES # BLD AUTO: 14 % (ref 14–44)
MCH RBC QN AUTO: 25.8 PG (ref 26.8–34.3)
MCHC RBC AUTO-ENTMCNC: 30.8 G/DL (ref 31.4–37.4)
MCV RBC AUTO: 84 FL (ref 82–98)
MONOCYTES # BLD AUTO: 0.83 THOUSAND/UL (ref 0–1.22)
MONOCYTES NFR BLD: 7 % (ref 4–12)
MYELOCYTES NFR BLD MANUAL: 1 % (ref 0–1)
NEUTROPHILS # BLD MANUAL: 9.01 THOUSAND/UL (ref 1.85–7.62)
NEUTS SEG NFR BLD AUTO: 76 % (ref 43–75)
P AXIS: 35 DEGREES
PLATELET # BLD AUTO: 318 THOUSANDS/UL (ref 149–390)
PLATELET BLD QL SMEAR: ADEQUATE
PMV BLD AUTO: 9.7 FL (ref 8.9–12.7)
POLYCHROMASIA BLD QL SMEAR: PRESENT
POTASSIUM SERPL-SCNC: 3.7 MMOL/L (ref 3.5–5.3)
PR INTERVAL: 180 MS
QRS AXIS: 15 DEGREES
QRSD INTERVAL: 140 MS
QT INTERVAL: 412 MS
QTC INTERVAL: 501 MS
RBC # BLD AUTO: 3.1 MILLION/UL (ref 3.88–5.62)
RBC MORPH BLD: PRESENT
SODIUM SERPL-SCNC: 136 MMOL/L (ref 135–147)
T WAVE AXIS: 7 DEGREES
TARGETS BLD QL SMEAR: PRESENT
VENTRICULAR RATE: 89 BPM
WBC # BLD AUTO: 11.86 THOUSAND/UL (ref 4.31–10.16)

## 2022-12-14 RX ORDER — INSULIN GLARGINE 100 [IU]/ML
12 INJECTION, SOLUTION SUBCUTANEOUS
Status: DISCONTINUED | OUTPATIENT
Start: 2022-12-14 | End: 2022-12-19

## 2022-12-14 RX ORDER — OXYCODONE HYDROCHLORIDE 10 MG/1
10 TABLET ORAL EVERY 6 HOURS PRN
Status: DISCONTINUED | OUTPATIENT
Start: 2022-12-14 | End: 2023-01-17 | Stop reason: HOSPADM

## 2022-12-14 RX ORDER — INSULIN LISPRO 100 [IU]/ML
3 INJECTION, SOLUTION INTRAVENOUS; SUBCUTANEOUS
Refills: 0 | Status: ON HOLD
Start: 2022-12-14

## 2022-12-14 RX ORDER — METHOCARBAMOL 500 MG/1
500 TABLET, FILM COATED ORAL 2 TIMES DAILY
Status: DISCONTINUED | OUTPATIENT
Start: 2022-12-14 | End: 2023-01-17 | Stop reason: HOSPADM

## 2022-12-14 RX ORDER — AMLODIPINE BESYLATE 5 MG/1
5 TABLET ORAL 2 TIMES DAILY
Status: DISCONTINUED | OUTPATIENT
Start: 2022-12-14 | End: 2023-01-17 | Stop reason: HOSPADM

## 2022-12-14 RX ORDER — ACETAMINOPHEN 325 MG/1
975 TABLET ORAL EVERY 8 HOURS SCHEDULED
Refills: 0 | Status: ON HOLD
Start: 2022-12-14 | End: 2022-12-21

## 2022-12-14 RX ORDER — CALCIUM CARBONATE 200(500)MG
500 TABLET,CHEWABLE ORAL 2 TIMES DAILY PRN
Status: DISCONTINUED | OUTPATIENT
Start: 2022-12-14 | End: 2023-01-17 | Stop reason: HOSPADM

## 2022-12-14 RX ORDER — TAMSULOSIN HYDROCHLORIDE 0.4 MG/1
0.4 CAPSULE ORAL
Refills: 0 | Status: ON HOLD
Start: 2022-12-14

## 2022-12-14 RX ORDER — TAMSULOSIN HYDROCHLORIDE 0.4 MG/1
0.4 CAPSULE ORAL
Status: DISCONTINUED | OUTPATIENT
Start: 2022-12-14 | End: 2023-01-17 | Stop reason: HOSPADM

## 2022-12-14 RX ORDER — INSULIN LISPRO 100 [IU]/ML
1-5 INJECTION, SOLUTION INTRAVENOUS; SUBCUTANEOUS
Status: DISCONTINUED | OUTPATIENT
Start: 2022-12-14 | End: 2022-12-15

## 2022-12-14 RX ORDER — OXYCODONE HYDROCHLORIDE 5 MG/1
5 TABLET ORAL EVERY 6 HOURS PRN
Refills: 0 | Status: ON HOLD | OUTPATIENT
Start: 2022-12-14 | End: 2022-12-21

## 2022-12-14 RX ORDER — INSULIN GLARGINE 100 [IU]/ML
12 INJECTION, SOLUTION SUBCUTANEOUS
Qty: 10 ML | Refills: 0 | Status: ON HOLD
Start: 2022-12-14

## 2022-12-14 RX ORDER — OXYCODONE HYDROCHLORIDE 10 MG/1
10 TABLET ORAL EVERY 6 HOURS PRN
Refills: 0 | Status: ON HOLD | OUTPATIENT
Start: 2022-12-14 | End: 2022-12-21

## 2022-12-14 RX ORDER — ONDANSETRON 4 MG/1
4 TABLET, ORALLY DISINTEGRATING ORAL EVERY 6 HOURS PRN
Status: DISCONTINUED | OUTPATIENT
Start: 2022-12-14 | End: 2023-01-17 | Stop reason: HOSPADM

## 2022-12-14 RX ORDER — ATORVASTATIN CALCIUM 40 MG/1
40 TABLET, FILM COATED ORAL DAILY
Status: DISCONTINUED | OUTPATIENT
Start: 2022-12-15 | End: 2023-01-17 | Stop reason: HOSPADM

## 2022-12-14 RX ORDER — OMEGA-3S/DHA/EPA/FISH OIL/D3 300MG-1000
400 CAPSULE ORAL DAILY
Status: DISCONTINUED | OUTPATIENT
Start: 2022-12-15 | End: 2023-01-17 | Stop reason: HOSPADM

## 2022-12-14 RX ORDER — POTASSIUM CHLORIDE 20 MEQ/1
40 TABLET, EXTENDED RELEASE ORAL ONCE
Status: COMPLETED | OUTPATIENT
Start: 2022-12-14 | End: 2022-12-14

## 2022-12-14 RX ORDER — INSULIN LISPRO 100 [IU]/ML
1-6 INJECTION, SOLUTION INTRAVENOUS; SUBCUTANEOUS
Status: DISCONTINUED | OUTPATIENT
Start: 2022-12-14 | End: 2022-12-15

## 2022-12-14 RX ORDER — CALCIUM CARBONATE 200(500)MG
500 TABLET,CHEWABLE ORAL 2 TIMES DAILY PRN
Refills: 0 | Status: ON HOLD
Start: 2022-12-14 | End: 2022-12-21

## 2022-12-14 RX ORDER — INSULIN LISPRO 100 [IU]/ML
3 INJECTION, SOLUTION INTRAVENOUS; SUBCUTANEOUS
Status: DISCONTINUED | OUTPATIENT
Start: 2022-12-14 | End: 2022-12-14 | Stop reason: HOSPADM

## 2022-12-14 RX ORDER — INSULIN LISPRO 100 [IU]/ML
3 INJECTION, SOLUTION INTRAVENOUS; SUBCUTANEOUS
Status: DISCONTINUED | OUTPATIENT
Start: 2022-12-14 | End: 2022-12-16

## 2022-12-14 RX ORDER — PANTOPRAZOLE SODIUM 40 MG/1
40 TABLET, DELAYED RELEASE ORAL
Status: DISCONTINUED | OUTPATIENT
Start: 2022-12-14 | End: 2023-01-17 | Stop reason: HOSPADM

## 2022-12-14 RX ORDER — OXYCODONE HYDROCHLORIDE 5 MG/1
5 TABLET ORAL EVERY 6 HOURS PRN
Status: DISCONTINUED | OUTPATIENT
Start: 2022-12-14 | End: 2023-01-17 | Stop reason: HOSPADM

## 2022-12-14 RX ORDER — ACETAMINOPHEN 325 MG/1
975 TABLET ORAL EVERY 8 HOURS SCHEDULED
Status: DISCONTINUED | OUTPATIENT
Start: 2022-12-14 | End: 2023-01-17 | Stop reason: HOSPADM

## 2022-12-14 RX ORDER — HEPARIN SODIUM 5000 [USP'U]/ML
5000 INJECTION, SOLUTION INTRAVENOUS; SUBCUTANEOUS EVERY 8 HOURS SCHEDULED
Status: DISCONTINUED | OUTPATIENT
Start: 2022-12-14 | End: 2023-01-17 | Stop reason: HOSPADM

## 2022-12-14 RX ORDER — ASPIRIN 81 MG/1
81 TABLET, CHEWABLE ORAL DAILY
Status: DISCONTINUED | OUTPATIENT
Start: 2022-12-15 | End: 2023-01-17 | Stop reason: HOSPADM

## 2022-12-14 RX ORDER — INSULIN GLARGINE 100 [IU]/ML
12 INJECTION, SOLUTION SUBCUTANEOUS
Status: DISCONTINUED | OUTPATIENT
Start: 2022-12-14 | End: 2022-12-14 | Stop reason: HOSPADM

## 2022-12-14 RX ADMIN — ERTAPENEM SODIUM 1000 MG: 1 INJECTION, POWDER, LYOPHILIZED, FOR SOLUTION INTRAMUSCULAR; INTRAVENOUS at 21:18

## 2022-12-14 RX ADMIN — PANTOPRAZOLE SODIUM 40 MG: 40 TABLET, DELAYED RELEASE ORAL at 16:38

## 2022-12-14 RX ADMIN — OXYCODONE HYDROCHLORIDE 10 MG: 10 TABLET ORAL at 16:38

## 2022-12-14 RX ADMIN — AMLODIPINE BESYLATE 5 MG: 5 TABLET ORAL at 09:15

## 2022-12-14 RX ADMIN — ASPIRIN 81 MG CHEWABLE TABLET 81 MG: 81 TABLET CHEWABLE at 09:15

## 2022-12-14 RX ADMIN — ACETAMINOPHEN 975 MG: 325 TABLET, FILM COATED ORAL at 21:40

## 2022-12-14 RX ADMIN — INSULIN LISPRO 6 UNITS: 100 INJECTION, SOLUTION INTRAVENOUS; SUBCUTANEOUS at 09:12

## 2022-12-14 RX ADMIN — Medication 1 TABLET: at 09:15

## 2022-12-14 RX ADMIN — CHOLECALCIFEROL TAB 10 MCG (400 UNIT) 400 UNITS: 10 TAB at 09:15

## 2022-12-14 RX ADMIN — METHOCARBAMOL 500 MG: 500 TABLET ORAL at 17:43

## 2022-12-14 RX ADMIN — HEPARIN SODIUM 5000 UNITS: 5000 INJECTION INTRAVENOUS; SUBCUTANEOUS at 21:41

## 2022-12-14 RX ADMIN — POTASSIUM CHLORIDE 40 MEQ: 1500 TABLET, EXTENDED RELEASE ORAL at 09:14

## 2022-12-14 RX ADMIN — AMLODIPINE BESYLATE 5 MG: 5 TABLET ORAL at 17:43

## 2022-12-14 RX ADMIN — ACETAMINOPHEN 975 MG: 325 TABLET ORAL at 05:04

## 2022-12-14 RX ADMIN — METHOCARBAMOL TABLETS 500 MG: 500 TABLET, COATED ORAL at 09:15

## 2022-12-14 RX ADMIN — ATORVASTATIN CALCIUM 40 MG: 40 TABLET, FILM COATED ORAL at 09:15

## 2022-12-14 RX ADMIN — HEPARIN SODIUM 5000 UNITS: 5000 INJECTION INTRAVENOUS; SUBCUTANEOUS at 05:04

## 2022-12-14 RX ADMIN — OXYCODONE HYDROCHLORIDE 10 MG: 10 TABLET ORAL at 22:26

## 2022-12-14 RX ADMIN — TAMSULOSIN HYDROCHLORIDE 0.4 MG: 0.4 CAPSULE ORAL at 16:38

## 2022-12-14 RX ADMIN — PANTOPRAZOLE SODIUM 40 MG: 40 TABLET, DELAYED RELEASE ORAL at 09:15

## 2022-12-14 RX ADMIN — CALCIUM CARBONATE (ANTACID) CHEW TAB 500 MG 500 MG: 500 CHEW TAB at 22:30

## 2022-12-14 NOTE — TREATMENT PLAN
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]  EXPECTED LOS: 2 - 3 weeks     MEDICAL/FUNCTIONAL PROGNOSIS  Based on my assessment of the patient's medical conditions and current functional status, the prognosis for attaining medical and functional goals or the IRF stay is:  Good    Medical Goals: Patient will be medically stable for discharge to [facility] upon completion of rehab program and Patient will be able to manage medical conditions and comorbid conditions with medications and follow up upon completion of rehab program    [address]: Home - independent/modified independent  INSTITUTIONAL SETTING: Intermediate care  Is a 24-hr caregiver available? No  Has discharge plan been discussed with primary caregiver? Yes  Date of Discussion: 12/14/2022    ANTICIPATED FOLLOW-UP SERVICE:   Home Health Services: PT, OT and Nursing    DISCIPLINE SPECIFIC PLANS:  Required Disciplines & Services: Rehabillitation Nursing, Case Management, Dietay/Nutrition, Diabetes Education and Psychology    REQUIRED THERAPY:  Therapy Hours per Day Days per Week Total Days   Physical Therapy 1 5 5 14-21 days   Occupational Therapy 1 5 5 14-21 days   NOTE: Additional therapy time(s) or changes to allocation of therapies as appropriate to meet patient needs and to achieve functional goals      Patient will participate in above therapy regimen consisting of PT and OT due to the following medical procedure/condition:Neurologic Conditions:  03 8  Neuromuscular Disorders    ANTICIPATED FUNCTIONAL OUTCOMES:  ADL:  modified independent   Bladder/Bowel:  modified independent   Transfers: modified independent   Locomotion:  modified independent   Cognitive:  modified independent     DISCHARGE PLANNING NEEDS  Equipment needs: Discharge needs to be reviewed with team      REHAB ANTICIPATED PARTICIPATION RESTRICTIONS:  Assist with Bathing Shower, Assist with Mobility, Decreased Insight to Deficits, Decreaed Safety Awareness, Rquires Assist with ADLS, Requires Assist with Heavy Homemaking and Requires Assit with Homemaking    Medical Necessity Criteria for ARC Admission:  The preadmission screen, post-admission physical evaluation, overall plan of care and admissions order demonstrate a reasonable expectation that the following criteria were met at the time of admission to the [facility]  (See "Specific areas of management and oversight in ARC setting" for additional details on medical necessity as outlined below)  1  The patient requires active and ongoing therapeutic intervention of multiple therapy disciplines (physical therapy, occupational therapy, speech-language pathology, or prosthetics/orthotics), one of which is physical or occupational therapy  2  Patient requires an intensive rehabilitation therapy program, as defined in Chapter 1, section 110 2 2 of the CMS Medicare Policy Manual  This intensive rehabilitation therapy program will consist of at least 3 hours of therapy per day at least 5 days per week or at least 15 hours of intensive rehabilitation therapy within a 7 consecutive day period, beginning with the date of admission to the [facility]  3  The patient is reasonably expected to actively participate in, and benefit significantly from, the intensive rehabilitation therapy program as defined in Chapter 1, section 110 2 2 of the CMS Medicare Policy Manual at this time of admission to the [facility]   He can reasonably be expected to make measurable improvement (that will be of practical value to improve the patient’s functional capacity or adaptation to impairments) as a result of the rehabilitation treatment, as defined in section 110 3, and such improvement can be expected to be made within the prescribed period of time  As noted in the CMS Medicare Policy Manual, the patient need not be expected to achieve complete independence in the domain of self-care nor be expected to return to his or her prior level of functioning in order to meet this standard  4  The patient must require physician supervision by a rehabilitation physician  As such, a rehabilitation physician will conduct face-to-face visits with the patient at least 3 days per week throughout the patient’s stay in the [facility] to assess the patient both medically and functionally, as well as to modify the course of treatment as needed to maximize the patient’s capacity to benefit from the rehabilitation process  5  The patient requires an intensive and coordinated interdisciplinary approach to providing rehabilitation, as defined in Chapter 1, section 110 2 5 of the CMS Medicare Policy Manual  This will be achieved through periodic team conferences, conducted at least once in a 7-day period, and comprising of an interdisciplinary team of medical professionals consisting of: a rehabilitation physician, registered nurse,  and/or , and a licensed/certified therapist from each therapy discipline involved in treating the patient  Changes Since Pre-admission Assessment: None -This patient's participation in rehab continues to be reasonable, necessary and appropriate  CMS Required Post-Admission Physician Evaluation Elements  History and Physical, including medical history, functional history and active comorbidities as in above text  Post-Admission Physician Evaluation:  The patient has the potential to make improvement and is in need of physical, occupational, and/or therapy services  The patient may also need nutritional services   Given the patient's complex medical condition and risk of further medical complications, rehabilitative services cannot be safely provided at a lower level of care, such as a skilled nursing facility  I have reviewed the patient's functional and medical status at the time of the preadmission screening and they are the same as on the day of this admission  I acknowledge that I have personally performed a full physical examination on this patient within 24 hours of admission  The patient demonstrated understanding the rehabilitation program and the discharge process after we discussed them  Agree in entirety: yes  Minor adaptions: none    Major changes: none    Specific areas of management and oversight in ARC setting:  Cardiopulmonary function/Anemia: Ensure cardiopulmonary stability and optimize cardiopulmonary function not only at rest but with activity as patient's activity level significantly increases in acute rehab compared with prior to transfer in preparation for safe discharge from [facility]  Must closely and frequently monitor blood pressure, HR, anemia to ensure adequate cardiac output during ADLs and ambulation as patient is at increased risk for orthostatic hypotension/syncope and potential injury if not monitored for and managed adequately  Blood pressure management:    Frequent monitoring of blood pressure with appropriate adjustments in blood pressure medication management to optimize blood pressure control and prevent/limit renal complications  Monitoring impact of blood pressure and side-effects of blood pressure medications at rest and with activity  DM: Patient will improve/maintain blood sugar control to ensure optimal healing and decrease risks of complications associated with DM through medication monitoring, adjustments as indicated, and optimal dietary intake and education    Pain management:  Pain will improve with frequent evaluation of pain, careful adjustments in medications, frequent re-evaluation of patient's pain and medical/neurologic status to ensure optimal pain control, avoidance of potential serious and even life-threatening side-effects and drug interactions, as well as weaning pain medications as soon as possible to decrease risk of short and long-term use  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, ADLs, and gait:  intensive skilled therapies with physical therapy and occupational therapy with close oversight and management by rehab specialized physician in acute rehabilitation setting to most expeditiously and effectively improve functional mobility, transfers, upper and lower body strengthening, conditioning, balance, and gait training with appropriate assistive device  Patient will have optimal supervision and management of patient's underlying neurologic disorder with specialized rehabilitation physician during this period of recovery to ensure most expeditious and optimal recovery with decreased risks of fall/injury and other complications including acute worsening of neuro disorder, decrease risk of VTE, PNA, and skin ulceration  Inpatient rehabilitation education/teaching: To be provided to patient and typically family/caregiver (if able to be identified) by all skilled therapists, rehab nursing, case management, and rehab specialized physician to ensure optimal recovery and decrease risks of complications in both acute rehabilitation setting as well as after discharge  Acute kidney failure management and blood pressure management: Frequent measurements and evaluation of urinary intake, urinary output, and labs which include BUN/Cr and electrolytes with appropriate adjustments in medication and when necessary order additional testing  Frequent monitoring of blood pressure with appropriate adjustments in blood pressure medication management to optimize blood pressure control and prevent/limit renal complications  Obesity:  Close monitoring of nutrition status, nutrition specialist with adjustments in diet     on appropriate short and long term nutrition and activity  Obesity increases complexity of patient's overall condition and causes unique challenges during this part of patient's recovery process  Supervise and if necessary make adjustments in rehab nursing care and skilled therapy care to ensure appropriate toileting, bed mobility, other ADLs, and ambulation to decrease risk of falls/injuries, VTE, skin breakdown/ulceration and optimize functional recovery  Protein-calorie malnutrition with failure to thrive:  Close monitoring of nutrition status, caloric intake, nutrition specialist with adjustments in diet, supplements, and at times medications to optimize nutritional status for both short-term and long-term functional recovery and prevention of complications associated with malnutrition  Skin wounds: Appropriate skin checks for wound/skin evaluation including evaluation of healing, worsening of wounds, or signs of infection  Wound care management from rehab nursing, wound care nursing, physicians  Ensure frequent appropriate turning, positioning in bed, in chair, when mobilizing, and when appropriate with use of appropriate devices to optimize healing and decrease risk of worsening or new skin breakdown        Desmond Orozco, [facility]

## 2022-12-14 NOTE — DISCHARGE SUMMARY
Discharge Summary - Surgical Oncology   Rd Quintanilla 61 y o  male MRN: [id number]  Unit/Bed#: [facility] 906-01 Encounter: [id number]    Admission Date:   Admission Orders (From admission, onward)     Ordered        12/07/22 1508  Inpatient Admission  Once            12/04/22 0024  Place in Observation  Once                        Admitting Diagnosis: Altered bowel elimination due to intestinal ostomy [facility]) [K94 [id number]]    HPI: As per my H&P on 12/4/2022, "Rd Quintanilla is a 61 y o  male who presents with abdominal pain  He was discharged on 12/3 following protracted surgical hospitalization to [facility]) and turned away due to concerns about need for pain medication and management needs  Currently he offers no complaints except intermittent abdominal pain  He is anxious  He has been tolerating diet with consistent stoma function  He denies fevers, chills, fainting, nausea, or vomiting  Denies chest pain and shortness of breath  "    Procedures Performed: No orders of the defined types were placed in this encounter  Hospital Course: The patient was admitted to the hospital, and placed on an insulin regimen with PRN pain medication  Care management was re-engaged to ensure his disposition following his Rebound from his initial Rehab placement  Ongoing care to his midline wound was provided daily  PT/OT and rehab planning were undertaken given his deconditioning  His diet was also expanded to include nutritional supplements  On hospital day four, purulent, bile tinged fluid began to express through his midline incision  He subsequently underwent abscess drain placement percutaneously and concomitant percutaneous cholecystostomy with interventional radiology  He was commenced on IV zosyn, and later transitioned to ertapenem based on sensitivity profiles from his drained collections   The patient's admission was complicated by chest pain on Hospital Day #7 EKG was unrevealing, troponin was not critically elevated, and his pain was self limiting  Cardiology later formally saw the patient for similar complaints on 12/14, stating similarly that no acute intervention was planned  His woody gradually resolved and he was taken off IV fluids  He was accepted for discharge to [facility] on 12/14/22, Hospital day 10  Provisions for his discharge wound care, medication reconciliation and final antibiotic plan were made prior to his departure from the hospital      Significant Findings, Care, Treatment and Services Provided:   12/8/22: IR abscess drain, IR Percutaneous cholecystostomy     Lab Results:   I have personally reviewed pertinent lab results  , CBC:   Lab Results   Component Value Date    WBC 11 86 (H) 12/14/2022    HGB 8 0 (L) 12/14/2022    HCT 26 0 (L) 12/14/2022    MCV 84 12/14/2022     12/14/2022    MCH 25 8 (L) 12/14/2022    MCHC 30 8 (L) 12/14/2022    RDW 18 0 (H) 12/14/2022    MPV 9 7 12/14/2022   , CMP:   Lab Results   Component Value Date    SODIUM 136 12/14/2022    K 3 7 12/14/2022     12/14/2022    CO2 27 12/14/2022    BUN 16 12/14/2022    CREATININE 1 43 (H) 12/14/2022    CALCIUM 8 8 12/14/2022    EGFR 53 12/14/2022   , Magnesium: No components found for: MAG, Phosphorous: No results found for: PHOS    Complications: intra-abdominal abscess, acute kidney injury     Discharge Diagnosis: metastatic colon cancer     Medical Problems     Resolved Problems  Date Reviewed: 12/14/2022   None         Condition at Discharge: fair         Discharge instructions/Information to patient and family:   See after visit summary for information provided to patient and family  Provisions for Follow-Up Care:  See after visit summary for information related to follow-up care and any pertinent home health orders  Disposition: Short-term rehab at [place] [address]  Readmission: No    Discharge Statement   I spent 30 minutes discharging the patient  This time was spent on the day of discharge   I had direct contact with the patient on the day of discharge  Additional documentation is required if more than 30 minutes were spent on discharge  Discharge Medications:  See after visit summary for reconciled discharge medications provided to patient and family

## 2022-12-14 NOTE — ASSESSMENT & PLAN NOTE
· MRI-multiple hepatic metastasis; smaller lesions in left lobe, larger lesions in right lobe  · 11/7 Exp Lap, resection of hepatic segment 3, resection of transverse colon with reversal of loop colostomy (Dr Marilin Camejo)  · 11/16- right hemicolectomy, left discontinuity and temporary abdominal closure device placed  · 11/17- re-exploration, partial descending colectomy, primary colocolonic anastomosis with diverting loop ileostomy, midline VAC placement  · CT showed abdominal abscess and distended gallbladder  · 12/8- IR percutaneous cholecystostomy tube, hepatectomy abscess drain placement  ·  IR on 12/20 additional drains placed for a perisplenic abscess as well as a splenic recess abscess  · Total of 3 drains in place

## 2022-12-14 NOTE — DISCHARGE INSTR - AVS FIRST PAGE
DISCHARGE INSTRUCTIONS    Follow Up: Follow up with Dr Jillian Ngo on 1/3 at 8:45AM at [address] [place] [address] Drive:   Question Answer   Site Anterior   Site Abdomen   Cleanse Soap & Water   Wound Care Mesalt Packing   Specify type: Ribbon   Apply Abd Pad   Open to air: No   Other wound care instructions Pack midline wound with mesalt packing, cover with ABD pads, secure with tape  DO NOT PLACE PACKING in right lateral wound (old ostomy site)  Antibiotics: Per infectious disease recommendations  IV ertapenem until 12/15/2022 once daily  Diet: Diabetic Diet    Pain: Oxycodone 5mg for moderate pain every 6 hours as needed  Oxycodone 10mg for severe/breakthrough pain every 6 hours as needed  Tylenol for mild pain control every 8 hours scheduled scheduled for 7 days  Robaxin 500 mg twice a day scheduled for 7 days  Shower: You may shower over the wound  Do not bathe or use a pool or hot tub until cleared by the physician  Activity: As tolerated  You may go up and down stairs, walk as much as you are comfortable, but walk at least 3 times each day  Do not lift anything heavier than 15 pounds for at least 2-4 weeks, unless cleared by the doctor  Medications: Resume all of your previous medications, unless told otherwise by the doctor  You do not need to take the narcotic pain medications unless you are having significant pain and discomfort  Call the office: If you are experiencing any of the following, fevers above 101 5° or chills, significant nausea or vomiting, increase in abdominal pain, if the wound develops drainage and/or is excessive redness around the wound, or if you have significant diarrhea or other worsening symptoms

## 2022-12-14 NOTE — PROGRESS NOTES
[facility] PROGRESS NOTE   Sandy Sepulveda 61 y o  male MRN: [id number]  Unit/Bed#: [facility] 906-01 Encounter: [id number]  Reason for Consult: ALEXY    ASSESSMENT and PLAN:  1  Acute kidney injury:  · Admission creatinine of 1 16 on 12/4/22  · ALEXY due to ATN from contrast nephropathy  (CT with IV contrast on 12/8/22)  · Peak creatinine: 2 10 on 12/11/22  · Good UO  · Renal function improving - SCr down to 1 43 today  Back to baseline  2  Chronic kidney disease, stage III:  · Baseline creatinine around 1 2-1 4   · No prior renal follow-up  3  Hypertension:  · BP acceptable  · Current medications: Amlodipine 5 mg BID  · Was on Lisinopril prior to admission  · Would hold off on Lisinopril to avoid autoregulatory failure in the setting of current illness  · Continue Amlodipine    4  Anemia:  · Per primary team     5  Metastatic colon cancer:  · S/p transverse colectomy, takedown of colostomy, diverting loop ileostomy and right hemicolectomy  · Now with intra-abdominal abscess s/p IR drain placement  6  Sepsis  · Due to intraabdominal abscess, recent E Coli bacteremia  · On Ertapenem  DISPOSITION:  · Improved renal function  · Creatinine back to baseline  · No objection for discharge to rehab from renal standpoint  · Would keep off lisinopril  · Continue amlodipine 5 mg BID  Discussed with surgery  SUBJECTIVE / 24H INTERVAL HISTORY:  Being planned for discharge to [facility] today  UO 3250 cc  Still with abdominal discomfort       OBJECTIVE:  Current Weight: Weight - Scale: 107 kg (236 lb 12 4 oz)  Vitals:    12/14/22 0600 12/14/22 0737 12/14/22 0739 12/14/22 1036   BP:  152/88 152/88 148/88   Pulse:  93 95 90   Resp:       Temp:  (!) 97 3 °F (36 3 °C) (!) 97 3 °F (36 3 °C) (!) 97 2 °F (36 2 °C)   TempSrc:       SpO2:  95% 95% 99%   Weight: 107 kg (236 lb 12 4 oz)      Height:           Intake/Output Summary (Last 24 hours) at 12/14/2022 1231  Last data filed at 12/14/2022 0501  Gross per 24 hour   Intake 240 ml   Output 2330 ml   Net -2090 ml     General: conscious, cooperative, no distress  Skin: dry  Eyes: pink conjunctivae  ENT: dry mucous membranes  Respiratory: equal chest expansion, decreased in bases  Cardiovascular: distinct heart sounds, normal rate, regular rhythm, no rub  Abdomen: soft, midline dressing, (+) ileostomy, (+) drains  Extremities: no edema  Genitourinary: no rivero catheter  Neuro: awake, alert     Psych: appropriate affect    Medications:    Current Facility-Administered Medications:   •  acetaminophen (TYLENOL) tablet 975 mg, 975 mg, Oral, Q8H [address], Jenna Decker MD, 975 mg at 12/14/22 0504  •  amLODIPine (NORVASC) tablet 5 mg, 5 mg, Oral, BID, Jenna Decker MD, 5 mg at 12/14/22 1748  •  aspirin chewable tablet 81 mg, 81 mg, Oral, Daily, Jenna Decker MD, 81 mg at 12/14/22 0915  •  atorvastatin (LIPITOR) tablet 40 mg, 40 mg, Oral, Daily, Jenna Decker MD, 40 mg at 12/14/22 0915  •  calcium carbonate (TUMS) chewable tablet 500 mg, 500 mg, Oral, BID PRN, Juan Monsivais MD, 500 mg at 12/11/22 2226  •  cholecalciferol (VITAMIN D3) tablet 400 Units, 400 Units, Oral, Daily, Jenna Decker MD, 400 Units at 12/14/22 0915  •  ertapenem (INVanz) 1,000 mg in sodium chloride 0 9 % 50 mL IVPB, 1,000 mg, Intravenous, Q24H, Tree Woodward MD, Last Rate: 100 mL/hr at 12/13/22 1211, 1,000 mg at 12/13/22 1211  •  heparin (porcine) subcutaneous injection 5,000 Units, 5,000 Units, Subcutaneous, Q8H [address], Maggy Villatoro PA-C, 5,000 Units at 12/14/22 0504  •  HYDROmorphone (DILAUDID) injection 0 5 mg, 0 5 mg, Intravenous, Q3H PRN, Juan Monsivais MD, 0 5 mg at 12/06/22 0447  •  insulin glargine (LANTUS) subcutaneous injection 12 Units 0 12 mL, 12 Units, Subcutaneous, HS, Chito Medel MD  •  insulin lispro (HumaLOG) 100 units/mL subcutaneous injection 1-5 Units, 1-5 Units, Subcutaneous, HS, Richard Servin MD, 1 Units at 12/11/22 6720  • insulin lispro (HumaLOG) 100 units/mL subcutaneous injection 1-6 Units, 1-6 Units, Subcutaneous, TID AC, 1 Units at 12/13/22 1617 **AND** Fingerstick Glucose (POCT), , , TID AC, Richard Lan MD  •  insulin lispro (HumaLOG) 100 units/mL subcutaneous injection 3 Units, 3 Units, Subcutaneous, TID With Meals, Richie Riddle MD  •  methocarbamol (ROBAXIN) tablet 500 mg, 500 mg, Oral, BID, Archana Snell MD, 500 mg at 12/14/22 0915  •  metoprolol (LOPRESSOR) injection 5 mg, 5 mg, Intravenous, Q8H PRN, Maggy Villatoro PA-C, 5 mg at 12/08/22 0508  •  multivitamin-minerals (CENTRUM) tablet 1 tablet, 1 tablet, Oral, Daily, Archana Snell MD, 1 tablet at 12/14/22 0915  •  ondansetron (ZOFRAN-ODT) dispersible tablet 4 mg, 4 mg, Oral, Q6H PRN, Archana Snell MD, 4 mg at 12/08/22 0440  •  oxyCODONE (ROXICODONE) immediate release tablet 10 mg, 10 mg, Oral, Q6H PRN, Archana Snell MD, 10 mg at 12/13/22 1940  •  oxyCODONE (ROXICODONE) IR tablet 5 mg, 5 mg, Oral, Q6H PRN, Archana Snell MD, 5 mg at 12/07/22 1992  •  pantoprazole (PROTONIX) EC tablet 40 mg, 40 mg, Oral, BID AC, Archana Snell MD, 40 mg at 12/14/22 0915  •  tamsulosin (FLOMAX) capsule 0 4 mg, 0 4 mg, Oral, Daily With Mirna Cardona MD, 0 4 mg at 12/13/22 1615    Laboratory Results:  Results from last 7 days   Lab Units 12/14/22  0522 12/13/22  0523 12/12/22  0509 12/11/22  2126 12/11/22  1051 12/11/22  0648 12/10/22  1232 12/09/22  1635 12/09/22  0541 12/08/22  0440   WBC Thousand/uL 11 86* 14 37* 13 22*  --  13 09* 12 67*  --   --  21 51* 28 29*   HEMOGLOBIN g/dL 8 0* 8 6* 8 0* 7 4* 6 6* 6 4*  --   --  8 2* 9 2*   HEMATOCRIT % 26 0* 27 5* 25 5* 23 6* 20 9* 20 3*  --   --  25 9* 29 2*   PLATELETS Thousands/uL 318 335 293  --  297 296  --   --  416* 416*   POTASSIUM mmol/L 3 7 3 9 3 5  --   --  3 6 3 9 5 0 4 4  --    CHLORIDE mmol/L 104 105 107  --   --  106 107 109* 106  --    CO2 mmol/L 27 24 25  --   --  21 20* 16* 19*  --    BUN mg/dL 16 18 22  --   --  26* 27* 25 22  --    CREATININE mg/dL 1 43* 1 62* 1 95*  --   --  2 10* 1 94* 1 71* 1 46*  --    CALCIUM mg/dL 8 8 8 8 8 3  --   --  8 0* 7 8* 7 9* 8 0*  --    MAGNESIUM mg/dL  --   --   --   --   --  1 9  --   --  1 8  --    PHOSPHORUS mg/dL  --   --   --   --   --  3 9  --   --  4 6*  --

## 2022-12-14 NOTE — PROGRESS NOTES
Progress Note - Surgical Oncology  Cleena Sharon 61 y o  male MRN: [id number]  Unit/Bed#: [facility] 906-01 Encounter: [id number]    Assessment:  Status post exploratory laparotomy with segment 3 liver resection, transverse colectomy, left colectomy, ileocolonic anastomosis, diverting loop ileostomy  History of diabetes type 2  12/12 per radha   12/8 IR drain placement liver collection    Urine 3 2 L  Per radha 55  IR 75  Stool 275    Plan:  1  Continue infectious disease recommendation  2  Continue diabetic diet  3  Continue to work with PT OT  4  Possible arc today  5  Continue to be out of bed and ambulating the halls  6  Continue with Glucerna for nutritional supplementation  7  Follow a m  labs  8  Continue subcu heparin for DVT prophylaxis        Subjective/Objective     Subjective:   No acute events overnight  No pain  No nausea or vomiting  Objective:     Blood pressure 152/87, pulse 92, temperature (!) 97 3 °F (36 3 °C), resp  rate 20, height 5' 7 99" (1 727 m), weight 107 kg (236 lb 1 8 oz), SpO2 95 %  ,Body mass index is 35 91 kg/m²        Intake/Output Summary (Last 24 hours) at 12/14/2022 0204  Last data filed at 12/14/2022 0154  Gross per 24 hour   Intake 230 ml   Output 2885 ml   Net -2655 ml       Invasive Devices     Central Venous Catheter Line  Duration           Port A Cath 03/10/22 Right Chest 278 days          Peripheral Intravenous Line  Duration           Peripheral IV 12/11/22 Left Antecubital 2 days          Drain  Duration           Ileostomy LUQ 26 days    Abscess Drain Abdomen 5 days    Cholecystostomy Tube 1 day                Physical Exam:   Gen: NAD, Comfortable  Neuro: A&O, No focal deficits  Head: Normal Cephalic, Atraumatic  Eye: EOMI, PERRLA, No scleral icterus  Neck: Supple, No JVD, Midline trachea  CV: RRR, Cap refill <2 sec  Pulm: Normal work of breathing, no respiratory distress  Abd: Soft, Non-Distended, Non-Tender  Ext: No edema, Non-tender  Skin: warm, dry, intact

## 2022-12-14 NOTE — QUICK NOTE
Discussed with Cardiology, Dr Negro Harden  No new changes on EKG, stable from previous EKG  Nonspecific trend in Troponins  No new developments       Plan:   -Cardiology to see patient for clearance to ARC today    Blaire Napoles

## 2022-12-14 NOTE — QUICK NOTE
IR Quick Note:     Reached out to by surg onc regarding drainage into radha tube changing from bilious to bloody with clots  This happened on Monday and a tube change was performed  Radha tube was pushed more into the body of the gallbladder, but was still functioning  The radha tube is still a fresh placement, only placed on 12/8/2022, it is likely going to have some waxing and waning in terms of change in the output and tube color change  Hemoglobin is stable, no concern in active bleed from tube site  Please continue to monitor and reach out to IR for any questions or concerns       [address] [place] [address] Mirtha Cuevas

## 2022-12-14 NOTE — ASSESSMENT & PLAN NOTE
· HbgA1c 8 0 (9/22)  · Home monitoring with Maria T Weinberg  · Home: Lantus 12 units, Glyburide, Januvia, Metformin  · Here: Lantus 10 units,  Humalog 4 units with meals, SSI   · Follow with PCP (Dr Alisha Vaughn)

## 2022-12-14 NOTE — CASE MANAGEMENT
Case Management Discharge Planning Note    Patient name Madison Camacho P[facility] 906/PPHP 267-96 MRN [id number]  : 1965 Date 2022       Current Admission Date: 2022  Current Admission Diagnosis:Malignant neoplasm of transverse colon [facility])   Patient Active Problem List    Diagnosis Date Noted   • Severe protein-calorie malnutrition ([address] ) 2022   • Flash pulmonary edema ([address] ) 2022   • MR (mitral regurgitation) 2022   • Bacteremia 2022   • ESBL (extended spectrum beta-lactamase) producing bacteria infection 2022   • Acute respiratory failure with hypoxia ([address] ) 2022   • Encephalopathy 2022   • Cervical radiculopathy 10/26/2022   • Other fatigue 06/15/2022   • Colostomy prolapse ([address] ) 2022   • Colon cancer metastasized to liver ([facility] ) 2022   • 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 liver ([address] ) 2022   • Iron deficiency anemia, unspecified 2022   • Malignant neoplasm of transverse colon ([address] ) 2022   • Thrombocytosis 2022   • Hypokalemia 2022   • Transaminitis 2022   • Type 2 diabetes mellitus with hyperlipidemia ([address] ) 2022   • Colonic mass 2022   • Microcytic anemia 2022   • Left ureteral calculus 2020   • Incarcerated umbilical hernia    • Testicular hypogonadism 2017   • Low testosterone 2017   • Type 2 diabetes mellitus without complication, with long-term current use of insulin ([facility] ) 2016   • Benign essential hypertension 2016   • Mixed hyperlipidemia 2016   • Erectile dysfunction 2016   • Obesity (BMI 30-39 9) 2016      LOS (days): 7  Geometric Mean LOS (GMLOS) (days):   Days to GMLOS:     OBJECTIVE:  Risk of Unplanned Readmission Score: 28 12         Current admission status: Inpatient   Preferred Pharmacy:   [id number] [place] - [address] [facility] [place] [address]  Phone: [phone] Fax: [address] [place] [address] [facility] [address]  Phone: [phone] Fax: [phone]    [id number] [place] - [address] [facility] [place] [address]  Phone: [phone] Fax: [address], [facility] [address] [facility]  Phone: [phone] Fax: [phone]    Primary Care Provider: Aliyah Mccoy MD    Primary Insurance: [employer]  Secondary Insurance:     DISCHARGE DETAILS:    Discharge planning discussed with[de-identified] Patient        CM contacted family/caregiver?:  (VM left for spouse Kati)  Were Treatment Team discharge recommendations reviewed with patient/caregiver?: Yes  Did patient/caregiver verbalize understanding of patient care needs?: Yes  Were patient/caregiver advised of the risks associated with not following Treatment Team discharge recommendations?: Yes    Contacts  Patient Contacts: Marlena Joseph (spouse)  Relationship to Patient[de-identified] Family  Contact Method: Phone (VM left for the patient)  Phone Number: [phone]              Other Referral/Resources/Interventions Provided:  Referral Comments: Patient is able to admit to the [facility] today  CM attempted to call spouse, however VM was left  Patient states that he just called and spoke with her and informed her  Provider also stated that they informed her as well           Treatment Team Recommendation: Acute Rehab  Discharge Destination Plan[de-identified] Acute Rehab          Accepting Facility Name, [address] [facility] : [address] [facility]  Receiving Facility/Agency Phone Number: [phone]

## 2022-12-14 NOTE — PLAN OF CARE
Problem: Prexisting or High Potential for Compromised Skin Integrity  Goal: Skin integrity is maintained or improved  Description: INTERVENTIONS:  - Identify patients at risk for skin breakdown  - Assess and monitor skin integrity  - Assess and monitor nutrition and hydration status  - Monitor labs   - Assess for incontinence   - Turn and reposition patient  - Assist with mobility/ambulation  - Relieve pressure over bony prominences  - Avoid friction and shearing  - Provide appropriate hygiene as needed including keeping skin clean and dry  - Evaluate need for skin moisturizer/barrier cream  - Collaborate with interdisciplinary team   - Patient/family teaching  - Consider wound care consult   Outcome: Progressing     Problem: Potential for Falls  Goal: Patient will remain free of falls  Description: INTERVENTIONS:  - Educate patient/family on patient safety including physical limitations  - Instruct patient to call for assistance with activity   - Consult OT/PT to assist with strengthening/mobility   - Keep Call bell within reach  - Keep bed low and locked with side rails adjusted as appropriate  - Keep care items and personal belongings within reach  - Initiate and maintain comfort rounds  - Make Fall Risk Sign visible to staff  - Offer Toileting every 2 Hours, in advance of need  - Apply yellow socks and bracelet for high fall risk patients  - Consider moving patient to room near nurses station  Outcome: Progressing     Problem: PAIN - ADULT  Goal: Verbalizes/displays adequate comfort level or baseline comfort level  Description: Interventions:  - Encourage patient to monitor pain and request assistance  - Assess pain using appropriate pain scale  - Administer analgesics based on type and severity of pain and evaluate response  - Implement non-pharmacological measures as appropriate and evaluate response  - Consider cultural and social influences on pain and pain management  - Notify physician/advanced practitioner if interventions unsuccessful or patient reports new pain  Outcome: Progressing     Problem: INFECTION - ADULT  Goal: Absence or prevention of progression during hospitalization  Description: INTERVENTIONS:  - Assess and monitor for signs and symptoms of infection  - Monitor lab/diagnostic results  - Monitor all insertion sites, i e  indwelling lines, tubes, and drains  - Monitor endotracheal if appropriate and nasal secretions for changes in amount and color  - [place] appropriate cooling/warming therapies per order  - Administer medications as ordered  - Instruct and encourage patient and family to use good hand hygiene technique  - Identify and instruct in appropriate isolation precautions for identified infection/condition  Outcome: Progressing    Goal: Maintain or return to baseline ADL function  Description: INTERVENTIONS:  -  Assess patient's ability to carry out ADLs; assess patient's baseline for ADL function and identify physical deficits which impact ability to perform ADLs (bathing, care of mouth/teeth, toileting, grooming, dressing, etc )  - Assess/evaluate cause of self-care deficits   - Assess range of motion  - Assess patient's mobility; develop plan if impaired  - Assess patient's need for assistive devices and provide as appropriate  - Encourage maximum independence but intervene and supervise when necessary  - Involve family in performance of ADLs  - Assess for home care needs following discharge   - Consider OT consult to assist with ADL evaluation and planning for discharge  - Provide patient education as appropriate  Outcome: Progressing  Goal: Maintains/Returns to pre admission functional level  Description: INTERVENTIONS:  - Perform BMAT or MOVE assessment daily    - Set and communicate daily mobility goal to care team and patient/family/caregiver  - Collaborate with rehabilitation services on mobility goals if consulted  - Perform Range of Motion 3 times a day    - Reposition patient every 2 hours    - Dangle patient 3 times a day  - Stand patient 3 times a day  - Ambulate patient 3 times a day  - Out of bed to chair 3 times a day   - Out of bed for meals 3 times a day  - Out of bed for toileting  - Record patient progress and toleration of activity level   Outcome: Progressing     Problem: DISCHARGE PLANNING  Goal: Discharge to home or other facility with appropriate resources  Description: INTERVENTIONS:  - Identify barriers to discharge w/patient and caregiver  - Arrange for needed discharge resources and transportation as appropriate  - Identify discharge learning needs (meds, wound care, etc )  - Arrange for interpretive services to assist at discharge as needed  - Refer to Case Management Department for coordinating discharge planning if the patient needs post-hospital services based on physician/advanced practitioner order or complex needs related to functional status, cognitive ability, or social support system  Outcome: Progressing

## 2022-12-14 NOTE — INCIDENTAL FINDINGS
The following findings require follow up:  Radiographic finding   Finding: [facility] 12/8/2022  New findings of small left-sided extraluminal paracolic gas with small associated complex fluid which in close proximity to the colonic suture line; postoperative leak not excluded  Mild rim-enhancing left abdominal focal gas and fluid collection in   close proximity measuring 4 4 x 2 4 cm suggestive of developing abscess  Smaller 2 7 x 2 0 cm gas and fluid-containing rim-enhancing fluid collection in the left anterior abdomen (image 66, series 2) also suggestive of abscess      New significant gallbladder distention now measuring up to 13 x 7 cm with associated gallbladder wall thickening/edema  New focal fluid collection contiguous with the gallbladder fossa measuring 6 8 x 5 2 cm      Mildly enlarged fluid collection adjacent to the segment 3 hepatectomy site now measuring 8 1 x 7 1 x 8 0 cm, previously 6 8 x 6 6 x 8 2 cm  Volume of air within the collection is also increased      New small air locules within the midline abdominal wound which appear to track to the anterior surface of bowel worrisome for enterocutaneous fistula      Small volume abdominopelvic ascites and diffuse mesenteric edema, similar to prior       Bilateral lower lobe pneumonia      Small bilateral pleural effusions      The study was marked in EPIC for immediate notification         Workstation performed: [id number]    Patient treated with IR consult for drainage of liver fluid collection as well as percutaneous cholecystostomy tube for gallbladder inflammation and acute cholecystitis  ID consulted and remains on board for antibiotic regimen and longevity  Follow up required: Follow-up with Dr Renetta Jose in 2 to 3 weeks  Follow-up with primary care doctor in 2 to 3 weeks once discharged from rehab       Follow up should be done within 2 week(s)    Please notify the following clinician to assist with the follow up:   Dr Renetta Jose in 2 to 3 weeks as mentioned above  Follow-up with primary care doctor in 2 to 3 weeks once discharged from rehab      Rosanna Jackson

## 2022-12-14 NOTE — RESTORATIVE TECHNICIAN NOTE
Restorative Technician Note      Patient Name: Marta Ahumada     Restorative Tech Visit Date: 12/14/22  Note Type: Mobility  Patient Position Upon Consult: Bedside chair  Activity Performed: Ambulated  Assistive Device: Roller walker  Patient Position at End of Consult: Supine;  All needs within [facility]

## 2022-12-14 NOTE — H&P
PHYSICAL MEDICINE AND REHABILITATION H&P/ADMISSION NOTE  Vic Mart 61 y o  male MRN: [id number]  Unit/Bed#: -01 Encounter: [id number]     Rehab Diagnosis: Impairment of mobility, safety and Activities of Daily Living (ADLs) due to Neurologic Conditions:  03 8  Neuromuscular Disorders    Etiologic Dx: Critical Illness Myopathy  Date of Onset: 11/7/2022     Date of surgery:      11/7/2022 Exploratory Laparotomy, resection of hepatic segment 3, resection of transverse colon with reversal of loop colostomy  11/16/2022 Right hemicolectomy, left in discontinuity and temporary abdominal closure device placed  11/17/2022 Re-exploration, partial descending colectomy, primary colocolonic anastomosis created with diverting loop ileostomy, midline wound VAC placement  12/8/2022 IR Percutaneous Cholecystostomy Tube Placement, Hepatectomy Abscess Drain Placement  12/12/2022 IR Tube Check    History of Present Illness:     Vic Mart is a 61 y o  male with a medical history of hypertension, hyperlipidemia, ventral hernia, GERD that presented to [address] [place] [address] on 11/7 for an elective surgical procedure due to metastatic colon adenocarcinoma by Dr Geovanna Rosen  Patient presented to hem/onc on 2/22 when CT of abdomen revealed transverse colonic apple core lesion and innumerable hepatic metastases  MRI revealed multiple hepatic metastasis with smaller lesions in left lobe with larger lesions on the right lobe  On 11/7, patient underwent exploratory laparotomy, segment 3 liver resection, ablation, intraoperative ultrasound of liver  This was complicated by a left upper quadrant hematoma, imaging showed suspected leak from transverse colon anastomosis  On 11/16 went to OR revealed left upper quadrant infected hematoma, defect in the transverse colon anastomosis with extravasation of succus  Massively dilated cecum requiring resection   He had a right hemicolectomy, left in discontinuity and temporary abdominal closure device was placed  On 11/17, he underwent re-exploration, partial descending colectomy, primary colonic anastomosis created with diverting loop ileostomy and midline wound VAC placement  He completed a prolonged course of IV antibiotics for ESBL bacteremia due to intra-abdominal abscess  On 12/3, patient was discharged to SNF for rehab  Patient was transferred back to hospital on 12/4 due to abdominal pain  He is unable to return to SNF due to concerns for pain management and management of acute medical needs  Per Surgery, patient is to continue with wet-to-dry dressings to midline abdominal incision, and packing to old stoma site  Patient has been observed off of antibiotics, with close monitoring of leukocytosis  On 12/6, patient was with noted increased creatinine level, and was administered 1L IV fluid bolus, with noted improvement  He then developed tachycardia, as well as increased abdominal pain, requiring additional IV fluid bolus  Abdominal incision was with noted yellow drainage  CT chest/abdomen/pelvis showed abdominal abscess and distended gallbladder  ID was consulted, and patient was continued on IV antibiotics (plan for 7 day course through 12/15)  Patient was taken to IR and underwent percutaneous cholecystostomy tube insertion and hepatectomy abscess drainage  Nephrology was consulted re: ALEXY, and initiated patient on Sodium Bicarb gtt as well as IV Albumin to assist with intravascular volume shifts, which as since been discontinued  On 12/14, patient with complaints of midline/left chestwall pain, right below IR abscess drain  Lasted for a few seconds and then resolved - suspected related to drain placement, however cardiac workup completed  Trop level was 57 and CXR showed progressive bilateral opacities suspicious for CHF and small left pleural effusion  Cardiology was consulted, with chest pain appearing musculoskeletal, and no further cardiac workup is warranted  Additionally, no diuresis or further intervention suggested for CXR findings  The patient was evaluated by the Rehabilitation team and deemed an appropriate candidate for comprehensive inpatient rehabilitation and admitted to the [facility] on 12/14/2022  3:18 PM    Rehabilitation  • Functional deficits: impaired mobility, self care  • Begin PT/OT/SLP  Rehabilitation goals are to achieve a modified independent level with mobility and self care  Prognosis is good  ELOS is 10-14 days  Estimated discharge is home       • DVT prophylaxis: Heparin  • Pain: abdominal pain- tylenol, oxycodone  • Bladder plan: continent, voiding  • Bowel plan: ileostomy  • Code Status: Full code      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 liver [facility])  Assessment & Plan  · MRI-multiple hepatic metastasis; smaller lesions in left lobe, larger lesions in right lobe  · 11/7 Exp Lap, resection of hepatic segment 3, resection of transverse colon with reversal of loop colostomy (Dr Rosalba Gutiérrez)  · 11/16- right hemicolectomy, left discontinuity and temporary abdominal closure device placed  · 11/17- re-exploration, partial descending colectomy, primary colocolonic anastomosis with diverting loop ileostomy, midline VAC placement  · Abdominal incision- yellow drainage; CT showed abdominal abscess and distended gallbladder  · 12/8- IR percutaneous cholecystostomy tube, hepatectomy abscess drain placement  · Monitor incisions, dressings  · Monitor CBC, CMP  · IM consulted for assistance with management  · Follow-up outpatient with hem/onc          * Malignant neoplasm of transverse colon [facility])  Assessment & Plan  · Transverse colonic apple core lesion and innumerable hepatic metastases  · Colostomy 2/22  · RCW [id number]- deaccessed  · S/p palliative chemo  · Follows with Dr Milena Newton (palliative)  · Follows hem/onc (Dr Reyna Harrell)    Acute kidney injury [facility])  Assessment & Plan  · Creatinine baseline 1 2-1 4  · Current creatinine 1 43  · Consider consult nephrology  · Monitor BMP    Bacteremia  Assessment & Plan  · Abdominal abscess- ESBL E Coli  · Ertapenam 7 days (12/18)  · Contact precautions  · VICTORINO drain- milky drainage  · Monitor CBC    Obesity (BMI 30-39  9)  Assessment & Plan  · BMI 33 0  · Diet and lifestyle modifications  · Nutrition consult    Benign essential hypertension  Assessment & Plan  · Home: Lisinopril  · Here: Norvasc 5 mg BID  · Adjust medication as needed  · IM consult for assistance with management  · Monitored outpatient by PCP    Type 2 diabetes mellitus without complication, with long-term current use of insulin (HCC)  Assessment & Plan  · HbgA1c 8 0 (9/22)  · Home monitoring with [facility]  · Home: Lantus 12 units, Humalog 3 units, Januvia  · Here: Humalog 3 units with meals, SSI, Lantus   · Follow with PCP (Dr Lyle De Jesus)        Subjective/Interval Events:     Review of Systems   Constitutional: Negative for chills and fever  HENT: Negative for congestion and sore throat  Eyes: Negative for photophobia and visual disturbance  Respiratory: Negative for cough and shortness of breath  Cardiovascular: Positive for leg swelling  Gastrointestinal: Positive for abdominal distention and abdominal pain  Endocrine: Negative for cold intolerance and heat intolerance  Genitourinary: Negative for difficulty urinating and dysuria  Musculoskeletal: Negative for arthralgias and back pain  Skin: Positive for wound  Negative for rash  Neurological: Negative for dizziness and headaches  Hematological: Does not bruise/bleed easily  Psychiatric/Behavioral: Negative for agitation and confusion  Prior Level of function and Home Environment:     PRIOR LEVEL OF FUNCTION:  He lives in a(n) single family home  Vic Mart is  and lives with their family (spouse and 2 teenage children)  Self Care: Independent, Indoor Mobility: Independent, Stairs (in/outdoor):  Independent and Cognition: Independent  Prior to patient's admission, patient was fully Independent with ADLs and IADLs, including driving  He was Independent without use of AD for mobility  FALLS IN THE LAST 6 MONTHS: none     HOME ENVIRONMENT:  The living area: can live on one level, bedroom on 2nd floor and bathroom on 2nd floor; 1/2 bath on main level and able to accommodate hospital bed in study  There are 2 steps to enter the home  The patient will not have 24 hour supervision/physical assistance available upon discharge  Patient's spouse and family is supportive, able to assist as needed  PREVIOUS DME:  Equipment in home (previous DME): Single Point Cane    Current Level of Function:  PT: mobility- Moderate assist, transfers- Maximal assist, ambulation- Mod A RW 8'  OT: ADL- Maximal assistance    Physical Exam:  /80 (BP Location: Left arm)   Pulse 98   Temp 98 1 °F (36 7 °C) (Oral)   Resp 18   Ht 5' 10" (1 778 m)   Wt 104 kg (230 lb)   SpO2 96%   BMI 33 00 kg/m²        Intake/Output Summary (Last 24 hours) at 12/14/2022 1903  Last data filed at 12/14/2022 1700  Gross per 24 hour   Intake --   Output 800 ml   Net -800 ml       Body mass index is 33 kg/m²  Physical Exam  Constitutional:       Appearance: He is obese  HENT:      Head: Normocephalic and atraumatic  Mouth/Throat:      Mouth: Mucous membranes are moist    Eyes:      Extraocular Movements: Extraocular movements intact  Cardiovascular:      Rate and Rhythm: Normal rate and regular rhythm  Pulses: Normal pulses  Heart sounds: Normal heart sounds  Pulmonary:      Effort: Pulmonary effort is normal       Comments: diminished  Abdominal:      General: There is distension  Palpations: Abdomen is soft  Tenderness: There is abdominal tenderness  There is guarding  Musculoskeletal:      Right lower leg: Edema present  Left lower leg: Edema present  Skin:     General: Skin is warm and dry        Capillary Refill: Capillary refill takes less than 2 seconds  Neurological:      Mental Status: He is alert and oriented to person, place, and time  Sensory: Sensory deficit present  Comments: peripheral neuropathy feet R<L  MMT limited by acute abdominal pain   Psychiatric:         Behavior: Behavior normal       Comments: Tearful        Labs, medications, and imaging personally reviewed      Laboratory:    Lab Results   Component Value Date    SODIUM 136 12/14/2022    K 3 7 12/14/2022     12/14/2022    CO2 27 12/14/2022    BUN 16 12/14/2022    CREATININE 1 43 (H) 12/14/2022    GLUC 139 12/14/2022    CALCIUM 8 8 12/14/2022     Lab Results   Component Value Date    WBC 11 86 (H) 12/14/2022    HGB 8 0 (L) 12/14/2022    HCT 26 0 (L) 12/14/2022    MCV 84 12/14/2022     12/14/2022     Lab Results   Component Value Date    INR 1 10 11/12/2022    INR 1 24 (H) 11/09/2022    INR 1 0 09/26/2022    PROTIME 14 4 11/12/2022    PROTIME 15 8 (H) 11/09/2022    PROTIME 10 6 09/26/2022         Current Facility-Administered Medications:   •  acetaminophen (TYLENOL) tablet 975 mg, 975 mg, Oral, Q8H [address], GAURANG Keith  •  amLODIPine (NORVASC) tablet 5 mg, 5 mg, Oral, BID, GAURANG Keith, 5 mg at 12/14/22 1743  •  [START ON 12/15/2022] aspirin chewable tablet 81 mg, 81 mg, Oral, Daily, GAURANG Keith  •  [START ON 12/15/2022] atorvastatin (LIPITOR) tablet 40 mg, 40 mg, Oral, Daily, GAURANG Keith  •  calcium carbonate (TUMS) chewable tablet 500 mg, 500 mg, Oral, BID PRN, GAURANG Keith  •  [START ON 12/15/2022] cholecalciferol (VITAMIN D3) tablet 400 Units, 400 Units, Oral, Daily, GAURANG Keith  •  ertapenem (INVanz) 1,000 mg in sodium chloride 0 9 % 50 mL IVPB, 1,000 mg, Intravenous, Q24H, GAURANG Keith  •  heparin (porcine) subcutaneous injection 5,000 Units, 5,000 Units, Subcutaneous, Q8H Jenelle ORTEGA CRNP  •  insulin glargine (LANTUS) subcutaneous injection 12 Units 0 12 mL, 12 Units, Subcutaneous, , Jenelle Martin, GAURANG  •  insulin lispro (HumaLOG) 100 units/mL subcutaneous injection 1-5 Units, 1-5 Units, Subcutaneous, HS, GAURANG Keith  •  insulin lispro (HumaLOG) 100 units/mL subcutaneous injection 1-6 Units, 1-6 Units, Subcutaneous, TID With Meals, GAURANG Keith  •  insulin lispro (HumaLOG) 100 units/mL subcutaneous injection 3 Units, 3 Units, Subcutaneous, TID With Meals, GAURANG Keith  •  methocarbamol (ROBAXIN) tablet 500 mg, 500 mg, Oral, BID, GAURANG Keith, 500 mg at 12/14/22 1743  •  [START ON 12/15/2022] multivitamin-minerals (CENTRUM) tablet 1 tablet, 1 tablet, Oral, Daily, GAURANG Keith  •  ondansetron (ZOFRAN-ODT) dispersible tablet 4 mg, 4 mg, Oral, Q6H PRN, GAURANG Keith  •  oxyCODONE (ROXICODONE) immediate release tablet 10 mg, 10 mg, Oral, Q6H PRN, GAURANG Keith, 10 mg at 12/14/22 1638  •  oxyCODONE (ROXICODONE) IR tablet 5 mg, 5 mg, Oral, Q6H PRN, GAURANG Keith  •  pantoprazole (PROTONIX) EC tablet 40 mg, 40 mg, Oral, BID AC, GAURANG Keith, 40 mg at 12/14/22 1638  •  tamsulosin (FLOMAX) capsule 0 4 mg, 0 4 mg, Oral, Daily With Dinner, GAURANG Lopez, 0 4 mg at 12/14/22 1638  Allergies   Allergen Reactions   • Shellfish-Derived Products - Food Allergy Anaphylaxis   • Erythromycin GI Intolerance      Patient Active Problem List    Diagnosis Date Noted   • 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 liver ([place] [address] ) 03/02/2022   • Malignant neoplasm of transverse colon ([place] [address] ) 03/01/2022   • Severe protein-calorie malnutrition ([place] [address] ) 12/14/2022   • Acute kidney injury ([place] [address] ) 12/14/2022   • Flash pulmonary edema ([place] [address] ) 11/12/2022   • MR (mitral regurgitation) 11/12/2022   • Bacteremia 11/12/2022   • ESBL (extended spectrum beta-lactamase) producing bacteria infection 11/12/2022   • Acute respiratory failure with hypoxia ([facility] [address] ) 11/12/2022   • Encephalopathy 11/09/2022   • Cervical radiculopathy 10/26/2022   • Other fatigue 06/15/2022   • Colostomy prolapse ([facility] [address] ) 05/27/2022 • Colon cancer metastasized to liver ([facility] ) 03/12/2022   • Iron deficiency anemia, unspecified 03/01/2022   • Thrombocytosis 02/21/2022   • Hypokalemia 02/19/2022   • Transaminitis 02/19/2022   • Type 2 diabetes mellitus with hyperlipidemia ([facility] ) 02/18/2022   • Colonic mass 02/18/2022   • Microcytic anemia 02/18/2022   • Left ureteral calculus 01/30/2020   • Incarcerated umbilical hernia 95/59/6378   • Testicular hypogonadism 06/19/2017   • Low testosterone 05/30/2017   • Type 2 diabetes mellitus without complication, with long-term current use of insulin ([place] [address] ) 08/23/2016   • Benign essential hypertension 08/23/2016   • Mixed hyperlipidemia 08/23/2016   • Erectile dysfunction 07/11/2016   • Obesity (BMI 30-39 9) 07/11/2016     Past Medical History:   Diagnosis Date   • Abdominal pain 03/12/2022   • Acute renal failure ([place] [address] )     [id number] resolved   • Cancer ([place] [address] )    • Diabetes mellitus ([place] [address] )    • Enteritis 08/23/2016   • Gastroparesis due to DM ([place] [address] ) 08/23/2016   • GERD (gastroesophageal reflux disease)    • Hernia, ventral 08/04/2016   • Hyperlipidemia    • Hypertension    • Morbid obesity ([place] [address] ) 04/17/2018   • Postoperative visit 03/02/2022   • SIRS (systemic inflammatory response syndrome) ([place] [address] ) 03/12/2022   • Snoring    • Stage 3a chronic kidney disease ([place] [address] ) 02/19/2022     Past Surgical History:   Procedure Laterality Date   • COLONOSCOPY     • COLOSTOMY N/A 02/20/2022    Procedure: COLOSTOMY LOOP, diverting;  Surgeon: Alex Weathers MD;  Location: [facility];  Service: General   • ESOPHAGOGASTRODUODENOSCOPY N/A 08/24/2016    Procedure: ESOPHAGOGASTRODUODENOSCOPY (EGD); Surgeon: Danielle Encarnacion MD;  Location: [facility];   Service:    • EXPLORATORY LAPAROTOMY W/ BOWEL RESECTION N/A 11/16/2022    Procedure: LAPAROTOMY EXPLORATORY W/ BOWEL RESECTION- VAC PLACEMENT;  Surgeon: Antoine Boyer MD;  Location: [facility];  Service: Surgical Oncology   • EXPLORATORY LAPAROTOMY W/ BOWEL RESECTION N/A 11/17/2022 Procedure: LAPAROTOMY EXPLORATORY W/ BOWEL RESECTION;  Surgeon: Antoine Boyer MD;  [facility];  Service: Surgical Oncology   • ILEOSTOMY N/A 11/17/2022    Procedure: ILEOSTOMY;  Surgeon: Antoine Boyer MD;  [facility];  Service: Surgical Oncology   • ILEOSTOMY CLOSURE N/A 11/7/2022    Procedure: REVERSAL COLOSTOMY;  Surgeon: Radha Jackson MD;  [facility];  Service: Surgical Oncology   • IR CHOLECYSTOSTOMY TUBE CHECK/CHANGE/REPOSITION/REINSERTION/UPSIZE  12/12/2022   • IR CHOLECYSTOSTOMY TUBE PLACEMENT  12/8/2022   • IR DRAINAGE TUBE PLACEMENT  12/8/2022   • IR PORT PLACEMENT  03/10/2022   • KIDNEY STONE SURGERY     • LIVER BIOPSY LAPAROSCOPIC N/A 02/20/2022    Procedure: DIAGNOSTIC LAPAROSCOPIC LIVER BIOPSY, DIVERTING LOOP COLOSTOMY ;  Surgeon: Alex Weathers MD;  [facility];  Service: General   • LIVER LOBECTOMY N/A 11/7/2022    Procedure: SEGMENT 3 LIVER RESECTION, ABLATION, INTRAOPERATIVE U/S OF LIVER, PERITONEAL BIOPSY;  Surgeon: Radha Jackson MD;  [facility];  Service: Surgical Oncology   • RIGHT COLON RESECTION N/A 11/7/2022    Procedure: EX LAP, TRANVERSE COLON RESECTION;  Surgeon: Radha Jackson MD;  [facility];  Service: Surgical Oncology   • TONSILLECTOMY     • UMBILICAL HERNIA REPAIR LAPAROSCOPIC N/A 04/26/2019    Procedure: LAPAROSCOPIC UMBILICAL HERNIA REPAIR;  Surgeon: Alex Weathers MD;  [facility];  Service: General   • VAC DRESSING APPLICATION N/A 88/29/8651    Procedure: APPLICATION VAC DRESSING ABDOMEN/TRUNK;  Surgeon: Antoine Boyer MD;  [facility];  Service: Surgical Oncology     Social History     Socioeconomic History   • Marital status: /Civil Union     Spouse name: Not on file   • Number of children: Not on file   • Years of education: Not on file   • Highest education level: Not on file   Occupational History   • Occupation: ACTOR     Employer: [employer]   Tobacco Use   • Smoking status: Never   • Smokeless tobacco: Never   Vaping Use   • Vaping Use: Never used   Substance and Sexual Activity   • Alcohol use: Never   • Drug use: No   • Sexual activity: Yes     Partners: Female   Other Topics Concern   • Not on file   Social History Narrative   • Not on file     Social Determinants of Health     Financial Resource Strain: Not on file   Food Insecurity: No Food Insecurity   • Worried About Running Out of Food in the Last Year: Never true   • Ran Out of Food in the Last Year: Never true   Transportation Needs: No Transportation Needs   • Lack of Transportation (Medical): No   • Lack of Transportation (Non-Medical): No   Physical Activity: Insufficiently Active   • Days of Exercise per Week: 5 days   • Minutes of Exercise per Session: 10 min   Stress: No Stress Concern Present   • Feeling of Stress : Not at all   Social Connections: Not on file   Intimate Partner Violence: Not on file   Housing Stability: Low Risk    • Unable to Pay for Housing in the Last Year: No   • Number of Places Lived in the Last Year: 1   • Unstable Housing in the Last Year: No     Social History     Tobacco Use   Smoking Status Never   Smokeless Tobacco Never     Social History     Substance and Sexual Activity   Alcohol Use Never     Family History   Problem Relation Age of Onset   • Diabetes Mother         mellitus   • Lung cancer Mother    • Coronary artery disease Father      Medical Necessity Criteria for [facility] Admission: Chronic kidney disease, IV antibiotics, anemia, hypertension, diabetes mellitus  In addition, the preadmission screen, post-admission physical evaluation, overall plan of care and admissions order demonstrate a reasonable expectation that the following criteria were met at the time of admission to the [facility]    1  The patient requires active and ongoing therapeutic intervention of multiple therapy disciplines (physical therapy, occupational therapy, speech-language pathology, or prosthetics/orthotics), one of which is physical or occupational therapy  2  Patient requires an intensive rehabilitation therapy program, as defined in Chapter 1, section 110 2 2 of the CMS Medicare Policy Manual  This intensive rehabilitation therapy program will consist of at least 3 hours of therapy per day at least 5 days per week or at least 15 hours of intensive rehabilitation therapy within a 7 consecutive day period, beginning with the date of admission to the [facility]  3  The patient is reasonably expected to actively participate in, and benefit significantly from, the intensive rehabilitation therapy program as defined in Chapter 1, section 110 2 2 of the CMS Medicare Policy Manual at this time of admission to the [facility]  He can reasonably be expected to make measurable improvement (that will be of practical value to improve the patient’s functional capacity or adaptation to impairments) as a result of the rehabilitation treatment, as defined in section 110 3, and such improvement can be expected to be made within the prescribed period of time  As noted in the CMS Medicare Policy Manual, the patient need not be expected to achieve complete independence in the domain of self-care nor be expected to return to his or her prior level of functioning in order to meet this standard  4  The patient must require physician supervision by a rehabilitation physician  As such, a rehabilitation physician will conduct face-to-face visits with the patient at least 3 days per week throughout the patient’s stay in the [facility] to assess the patient both medically and functionally, as well as to modify the course of treatment as needed to maximize the patient’s capacity to benefit from the rehabilitation process    5  The patient requires an intensive and coordinated interdisciplinary approach to providing rehabilitation, as defined in Chapter 1, section 110 2 5 of the CMS Medicare Policy Manual  This will be achieved through periodic team conferences, conducted at least once in a 7-day period, and comprising of an interdisciplinary team of medical professionals consisting of: a rehabilitation physician, registered nurse,  and/or , and a licensed/certified therapist from each therapy discipline involved in treating the patient  Changes Since Pre-admission Assessment: None -This patient's participation in rehab continues to be reasonable, necessary and appropriate  CMS Required Post-Admission Physician Evaluation Elements  History and Physical, including medical history, functional history and active comorbidities as in above text  Post-Admission Physician Evaluation:  The patient has the potential to make improvement and is in need of physical, occupational, and/or therapy services  The patient may also need nutritional services  Given the patient's complex medical condition and risk of further medical complications, rehabilitative services cannot be safely provided at a lower level of care, such as a skilled nursing facility  I have reviewed the patient's functional and medical status at the time of the preadmission screening and they are the same as on the day of this admission  I acknowledge that I have personally performed a full physical examination on this patient within 24 hours of admission  The patient and/or family demonstrated understanding the rehabilitation program and the discharge process after we discussed them  Agree in entirety: yes  Minor adaptions: none    Major changes: none    GAURANG Zamora  Physical Medicine and [facility] [address]    Total visit time: 120 minutes, with more than 50% spent counseling/coordinating care  Counseling includes discussion with patient re: progress in therapies, functional issues observed by therapy staff, and discussion with patient regarding their current medical state and wellbeing   Coordination of patient's care was performed in conjunction with Internal Medicine service to monitor patient's labs, vitals, and management of their comorbidities

## 2022-12-14 NOTE — RESTORATIVE TECHNICIAN NOTE
Restorative Technician Note      Patient Name: Marta Ahumada     Restorative Tech Visit Date: 12/14/22  Note Type: Mobility  Patient Position Upon Consult: Supine  Activity Performed: Ambulated  Assistive Device: Other (Comment); Roller walker (Ax2)  Patient Position at End of Consult: Bedside chair;  All needs within reach      Donna Cruz

## 2022-12-14 NOTE — PROGRESS NOTES
Progress Note - Madison Andrea 61 y o  male MRN: [id number]    Unit/Bed#: [facility] 906-01 Encounter: [id number]      CC: diabetes f/u    Subjective:   Madison Andrea is a 61y o  year old male with type 2 diabetes admitted status post transverse colon resection     Feels well  No complaints  No hypoglycemia  Lajean Cassette episodes of tightly glucose control pt reports poor appetite  Objective:     Vitals: Blood pressure 148/88, pulse 90, temperature (!) 97 2 °F (36 2 °C), resp  rate 20, height 5' 7 99" (1 727 m), weight 107 kg (236 lb 12 4 oz), SpO2 99 %  ,Body mass index is 36 01 kg/m²  Intake/Output Summary (Last 24 hours) at 12/14/2022 1106  Last data filed at 12/14/2022 0501  Gross per 24 hour   Intake 240 ml   Output 2880 ml   Net -2640 ml       Physical Exam:  General Appearance: awake, appears stated age and cooperative  Head: Normocephalic, without obvious abnormality, atraumatic  Extremities: moves all extremities  Skin: Skin color and temperature normal    Pulm: no labored breathing        POC Glucose (mg/dl)   Date Value   12/14/2022 125   12/13/2022 107   12/13/2022 166 (H)   12/13/2022 111   12/13/2022 86   12/13/2022 70   12/12/2022 79   12/12/2022 142 (H)   12/12/2022 116   12/12/2022 105         Assessment and plan:  #Type 2 diabetes  Hemoglobin A1c 8 0 09/2022  Home regimen:  Januvia 100 mg, metformin-glyburide 5-500 mg daily, jorge Galloway  Inpatient regimen: Lantus 15 units daily at bedtime, Humalog 4 units 3 times daily with meals with correctional insulin     Recommendations:  Episodes of tightly glucose control fasting in the AM and prandial  Recommend to decrease Lantus from 15 to 12 units bedtime  decrease lispro from 6 to 3 units tid + correctional  Continue with accu checks        Portions of the record may have been created with voice recognition software

## 2022-12-14 NOTE — PROGRESS NOTES
At 1036 pt c/o CP midline  Pt said it was like the other day, not heartburn  He's rating his pain 5/10  VS temp 97 2, hr 90, resp 18, bp 148/88 & 99% on 2L Pt is currently laying in bed after sitting out for 2 hours prior  Notified [place] & she is coming to see pt  STAT ekg, trop & pcxr  Will continue to monitor

## 2022-12-14 NOTE — QUICK NOTE
Called to bedside for patient complaining of midline to left chest wall pain  All vital signs are stable  Temperature 97 2 degrees  Heart rate 90, respirations 18, saturating 97% on room air  Blood pressure 148/88  No respiratory distress  Patient sound asleep on evaluation  Patient describes the event as stabbing in nature located along the midline, right beneath his IR abscess drain, lasted a few seconds, resolved now  Denies any current chest pain or shortness of breath  Resting comfortably in no acute distress  General: Pt is AAOx3, lying down in bed in NAD  HEENT: normocephalic, moist mucous membranes   Neck: Supple, non-tender, ROM intact   CV: Palpable left chest wall pain near IR drain  RRR, no murmurs, gallops, rubs  S1 and S2  Resp: Lung sounds clear to auscultation B/L, normal respiratory effort no  wheezes, rhonchi, rhales   Abd: Soft, morbidly obese, with mild appropriate tenderness along incision sites and near IR drains, non-distended, non-tympanitic  Normoactive  bowelsounds all 4 quadrants  No rebound or guarding  No CVA tenderness  Abdominal incision clean, dry, intact, with midline packing in place  Ext: Warm with no cyanosis, mild edema, no deformities  ROM intact   Skin: No rashes, bruises, ulcers  Neuro: Sensation intact all 4 extremities  5+ strength all 4 extremities       Plan:  -Likely related to IR abscess drainage catheter discomfort  -Will complete workup with EKG, CXR, and troponin level  -Denies any current CP or SOB, resting comfortably  -Discussed plan with patient, he is in agreement    -Pending negative workup, plan is for dc to [facility] today  -Cleared by IR and Nephrology to [facility]

## 2022-12-14 NOTE — PROGRESS NOTES
Progress Note - Infectious Disease   Severo Herr 61 y o  male MRN: [id number]  Unit/Bed#: [facility] 906-01 Encounter: [id number]      Impression/Plan:  1   Sepsis- evolving since admission   Leukocytosis and tachycardia  Appears to be secondary to recurrent intra-abdominal abscesses  Consideration for the possibility of bacteremia once again   Fortunately the patient remained hemodynamically stable despite his systemic illness    White cell count has decreased  -Continue ertapenem through tomorrow to complete 7 days from the drainage  -Follow-up blood cultures   -Source control measures as below  -Recheck CBC with differential and CMP  -Supportive care     2   Intra-abdominal abscesses-in the setting of a recent complex surgical history including resection of segments of the liver, transverse colon resection, intra-abdominal abscesses requiring drainage and ileostomy   Patient now with possible leak into the region of the previous liver resections and 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 with succus drainage of the wound   Patient now status post VICTORINO drain placement with purulent fluid sent for culture and a drain remaining in place  -Antibiotics as above  -Follow-up cultures and adjust antibiotics needed  -Drain management  -Close surgical follow-up     3   Possible acute cholecystitis-although not much in the way of abdominal tenderness in the right upper quadrant   Patient status post percutaneous cholecystotomy drain  -Drain management  -Antibiotics as above     4   Recent anastomotic leak with intra-abdominal abscess/infected hematoma- status post exploratory laparotomy with drainage of the abscess and cecal resection with wound culture growing ESBL E  Coli  -Antibiotics as above  -Source control measures as above     5   Recent ESBL E  coli bacteremia-as a complication of the recent anastomotic leak with intra-abdominal abscess formation   Patient received a course of ertapenem for 7 days postoperatively   -Antibiotics as above for now  -Follow-up blood cultures     6   Diabetes mellitus-type II    Have discussed the above management plan with the primary service    Antibiotics:  Ertapenem 3  Post drain placement 6    Subjective:  Patient has no fever, chills, sweats; no nausea, vomiting, diarrhea; no cough, shortness of breath; no pain  No new symptoms  Objective:  Vitals:  Temp:  [96 6 °F (35 9 °C)-97 3 °F (36 3 °C)] 97 2 °F (36 2 °C)  HR:  [] 90  Resp:  [18-20] 20  BP: (148-152)/(87-88) 148/88  SpO2:  [95 %-99 %] 99 %  Temp (24hrs), Av 1 °F (36 2 °C), Min:96 6 °F (35 9 °C), Max:97 3 °F (36 3 °C)  Current: Temperature: (!) 97 2 °F (36 2 °C)    Physical Exam:   General Appearance:  Alert, interactive, nontoxic, no acute distress  Throat: Oropharynx moist without lesions  Lungs:   Clear to auscultation bilaterally; no wheezes, rhonchi or rales; respirations unlabored   Heart:  RRR; no murmur, rub or gallop   Abdomen:   Soft, non-tender, non-distended, positive bowel sounds  Extremities: No clubbing, cyanosis or edema   Skin: No new rashes or lesions  No draining wounds noted         Labs, Imaging, & Other studies:   All pertinent labs and imaging studies were personally reviewed  Results from last 7 days   Lab Units 22  0522 22  0523 22  0509   WBC Thousand/uL 11 86* 14 37* 13 22*   HEMOGLOBIN g/dL 8 0* 8 6* 8 0*   PLATELETS Thousands/uL 318 335 293     Results from last 7 days   Lab Units 22  0522 22  0523 22  0509 22  0648 12/10/22  1232   SODIUM mmol/L 136 137 138 137 137   POTASSIUM mmol/L 3 7 3 9 3 5 3 6 3 9   CHLORIDE mmol/L 104 105 107 106 107   CO2 mmol/L 27 24 25 21 20*   BUN mg/dL 16 18 22 26* 27*   CREATININE mg/dL 1 43* 1 62* 1 95* 2 10* 1 94*   EGFR ml/min/1 73sq m 53 46 36 33 37   CALCIUM mg/dL 8 8 8 8 8 3 8 0* 7 8*   AST U/L  --   --   --  96* 122*   ALT U/L  --   --   --  32 37   ALK PHOS U/L  --   --   --  994* 961* Results from last 7 days   Lab Units 12/08/22  1903 12/08/22  1836 12/08/22  1619 12/08/22  1618   BLOOD CULTURE   --   --  No Growth After 5 Days  No Growth After 5 Days     GRAM STAIN RESULT  4+ Gram negative rods*  No polys seen* No Polys or Bacteria seen  --   --    BODY FLUID CULTURE, STERILE  4+ Growth of Escherichia coli ESBL*  4+ Growth of No growth  --   --

## 2022-12-14 NOTE — ASSESSMENT & PLAN NOTE
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

## 2022-12-14 NOTE — CONSULTS
Consultation - Cardiology Team One  Tamera Rizzo 61 y o  male MRN: [id number]  Unit/Bed#: [facility] 906-01 Encounter: [id number]    Inpatient consult to Cardiology  Consult performed by: GAURANG Barrera  Consult ordered by: Tata Kim PA-C          Physician Requesting Consult: Moon Valencia MD  Reason for Consult / Principal Problem: Chest pain       Assessment/ Plan    1  Chest pain   HS troponin 72/69/57  Reviewed ECG showing NSR with RBBB, no significant change from prior   Chest pain was described as sharp pain 5-10 seconds when getting repositioned by staff in chair  No further complaint of chest pain  Appears musculoskeletal    No chest pain with exertion   No further cardiac workup warranted  2  Metastatic colon cancer   S/p transverse colectomy, takedown of colostomy, diverting loop ileostomy and right hemicolectomy  Now with intra-abdominal abscess s/p IR drain placement  ID following  On ertapenem     3  Hypertension  BP stable: 148/88  On amlodipine 5 mg PO BID     4  Anemia   Hemoglobin 8 0    5  CKD stage III   Creatinine 1 4  ALEXY POA  Nephrology following       History of Present Illness   HPI: Tamera Rizzo is a 61y o  year old male who has paroxysmal atrial tachycardia, hypertension, type II diabetes, CKD stage III, gastroperesis, chemo induced neuropathy and a recent prolonged hospitalization due to metastatic colon cancer s/p ex lap, transverse colon resection, reversal of colostomy, segment 3 liver resection and ileostomy placement  He does not follow with a cardiologist  He reports no history of known CAD or heart failure  He [facility] from SNF due to concern for ability to manage patient  He was found to have recurrent intraabdominal abscess this admission  ID following and patient on ertapenem  Nephrology also following due to ALEXY POA but now resolved       Cardiology consulted today prior to being transferred to [facility] due to complaint of chest pain  Patient reports around 0930 he was sitting in recliner and staff had to reposition him  He experienced sharp chest pain without radiation or associated symptoms  Chest pain resolved within 5-10 seconds and no further complaint of chest pain  He notes since then, he has been OOB and even took a walk without any complaint of chest pain  He reports no history of known CAD as noted above  Prior to recent prolonged hospitalization he was living at home with his wife  He completed 7 months of chemotherapy and trying to stay active with doing house hold chores  He reports no exertional symptoms of chest pain or SOB prior to previous admission  Prior to diagnosis of metastatic cancer 2/2022 he was on [place] working full time  He denies tobacco or alcohol use  Echocardiogram 11/9/2022 showed EF 55% and moderate concentric hypertrophy  EKG reviewed personally:  Normal sinus rhythm with RBBB   Ventricular rate 89 bpm  SC interval 180 ms  QRSD 140 ms  QT interval 412 ms  QTc interval 501 ms     Telemetry reviewed personally:   No telemetry     Review of Systems   Constitutional: Positive for decreased appetite and malaise/fatigue  Negative for chills and fever  HENT: Negative for congestion  Cardiovascular: Negative for chest pain  Respiratory: Negative for shortness of breath  Musculoskeletal: Negative for falls  Gastrointestinal: Positive for bloating  Negative for nausea and vomiting  Neurological: Negative for dizziness and light-headedness  Psychiatric/Behavioral: Negative for altered mental status  All other systems reviewed and are negative      Historical Information   Past Medical History:   Diagnosis Date   • Abdominal pain 03/12/2022   • Acute renal failure ([facility] 75 )     [id number] resolved   • Cancer ([facility] 75 )    • Diabetes mellitus ([facility] )    • Enteritis 08/23/2016   • Gastroparesis due to DM ([facility] ) 08/23/2016   • GERD (gastroesophageal reflux disease)    • Hernia, ventral 08/04/2016 • Hyperlipidemia    • Hypertension    • Morbid obesity ([facility] ) 04/17/2018   • Postoperative visit 03/02/2022   • SIRS (systemic inflammatory response syndrome) ([place] [address] ) 03/12/2022   • Snoring    • Stage 3a chronic kidney disease ([place] [address] ) 02/19/2022     Past Surgical History:   Procedure Laterality Date   • COLONOSCOPY     • COLOSTOMY N/A 02/20/2022    Procedure: COLOSTOMY LOOP, diverting;  Surgeon: Laurinda Lundborg, MD;  Location: [facility];  Service: General   • ESOPHAGOGASTRODUODENOSCOPY N/A 08/24/2016    Procedure: ESOPHAGOGASTRODUODENOSCOPY (EGD); Surgeon: Michael Vanegas MD;  Location: [facility];   Service:    • EXPLORATORY LAPAROTOMY W/ BOWEL RESECTION N/A 11/16/2022    Procedure: LAPAROTOMY EXPLORATORY W/ BOWEL RESECTION- VAC PLACEMENT;  Surgeon: Enrique Michel MD;  Location: [facility];  Service: Surgical Oncology   • EXPLORATORY LAPAROTOMY W/ BOWEL RESECTION N/A 11/17/2022    Procedure: LAPAROTOMY EXPLORATORY W/ BOWEL RESECTION;  Surgeon: Enrique Michel MD;  Location: [facility];  Service: Surgical Oncology   • ILEOSTOMY N/A 11/17/2022    Procedure: ILEOSTOMY;  Surgeon: Enrique Michel MD;  [facility]: [facility];  Service: Surgical Oncology   • ILEOSTOMY CLOSURE N/A 11/7/2022    Procedure: REVERSAL COLOSTOMY;  Surgeon: Jodi Baxter MD;  [facility]: [facility];  Service: Surgical Oncology   • IR CHOLECYSTOSTOMY TUBE CHECK/CHANGE/REPOSITION/REINSERTION/UPSIZE  12/12/2022   • IR CHOLECYSTOSTOMY TUBE PLACEMENT  12/8/2022   • IR DRAINAGE TUBE PLACEMENT  12/8/2022   • IR PORT PLACEMENT  03/10/2022   • KIDNEY STONE SURGERY     • LIVER BIOPSY LAPAROSCOPIC N/A 02/20/2022    Procedure: DIAGNOSTIC LAPAROSCOPIC LIVER BIOPSY, DIVERTING LOOP COLOSTOMY ;  Surgeon: Laurinda Lundborg, MD;  Location: [facility];  Service: General   • LIVER LOBECTOMY N/A 11/7/2022    Procedure: SEGMENT 3 LIVER RESECTION, ABLATION, INTRAOPERATIVE U/S OF LIVER, PERITONEAL BIOPSY;  Surgeon: Jodi Baxter MD;  [facility]: [facility];  Service: Surgical Oncology   • RIGHT COLON RESECTION N/A 11/7/2022    Procedure: EX LAP, TRANVERSE COLON RESECTION;  Surgeon: Taylor Cerna MD;  [facility]: [facility];  Service: Surgical Oncology   • TONSILLECTOMY     • UMBILICAL HERNIA REPAIR LAPAROSCOPIC N/A 04/26/2019    Procedure: LAPAROSCOPIC UMBILICAL HERNIA REPAIR;  Surgeon: Gregoria Rowell MD;  Location: [facility];  Service: General   • VAC DRESSING APPLICATION N/A 63/70/9785    Procedure: APPLICATION VAC DRESSING ABDOMEN/TRUNK;  Surgeon: Estuardo Rodriguez MD;  [facility];  Service: Surgical Oncology     Social History     Substance and Sexual Activity   Alcohol Use Never     Social History     Substance and Sexual Activity   Drug Use No     Social History     Tobacco Use   Smoking Status Never   Smokeless Tobacco Never     Family History:   Family History   Problem Relation Age of Onset   • Diabetes Mother         mellitus   • Lung cancer Mother    • Coronary artery disease Father        Meds/Allergies   all current active meds have been reviewed and current meds:   Current Facility-Administered Medications   Medication Dose Route Frequency   • acetaminophen (TYLENOL) tablet 975 mg  975 mg Oral Q8H [address]   • amLODIPine (NORVASC) tablet 5 mg  5 mg Oral BID   • aspirin chewable tablet 81 mg  81 mg Oral Daily   • atorvastatin (LIPITOR) tablet 40 mg  40 mg Oral Daily   • calcium carbonate (TUMS) chewable tablet 500 mg  500 mg Oral BID PRN   • cholecalciferol (VITAMIN D3) tablet 400 Units  400 Units Oral Daily   • ertapenem (INVanz) 1,000 mg in sodium chloride 0 9 % 50 mL IVPB  1,000 mg Intravenous Q24H   • heparin (porcine) subcutaneous injection 5,000 Units  5,000 Units Subcutaneous Q8H [address]   • HYDROmorphone (DILAUDID) injection 0 5 mg  0 5 mg Intravenous Q3H PRN   • insulin glargine (LANTUS) subcutaneous injection 12 Units 0 12 mL  12 Units Subcutaneous HS   • insulin lispro (HumaLOG) 100 units/mL subcutaneous injection 1-5 Units  1-5 Units Subcutaneous HS   • insulin lispro (HumaLOG) 100 units/mL subcutaneous injection 1-6 Units  1-6 Units Subcutaneous TID AC   • insulin lispro (HumaLOG) 100 units/mL subcutaneous injection 3 Units  3 Units Subcutaneous TID With Meals   • methocarbamol (ROBAXIN) tablet 500 mg  500 mg Oral BID   • metoprolol (LOPRESSOR) injection 5 mg  5 mg Intravenous Q8H PRN   • multivitamin-minerals (CENTRUM) tablet 1 tablet  1 tablet Oral Daily   • ondansetron (ZOFRAN-ODT) dispersible tablet 4 mg  4 mg Oral Q6H PRN   • oxyCODONE (ROXICODONE) immediate release tablet 10 mg  10 mg Oral Q6H PRN   • oxyCODONE (ROXICODONE) IR tablet 5 mg  5 mg Oral Q6H PRN   • pantoprazole (PROTONIX) EC tablet 40 mg  40 mg Oral BID AC   • tamsulosin (FLOMAX) capsule 0 4 mg  0 4 mg Oral Daily With Dinner          Allergies   Allergen Reactions   • Shellfish-Derived Products - Food Allergy Anaphylaxis   • Erythromycin GI Intolerance       Objective   Vitals: Blood pressure 148/88, pulse 90, temperature (!) 97 2 °F (36 2 °C), resp  rate 20, height 5' 7 99" (1 727 m), weight 107 kg (236 lb 12 4 oz), SpO2 99 %  ,     Body mass index is 36 01 kg/m²  ,     Systolic (76MYR), ZQX:766 , Min:148 , JST:204     Diastolic (96FWO), LWO:75, Min:87, Max:88            Intake/Output Summary (Last 24 hours) at 12/14/2022 1304  Last data filed at 12/14/2022 0501  Gross per 24 hour   Intake 240 ml   Output 1530 ml   Net -1290 ml     Weight (last 2 days)     Date/Time Weight    12/14/22 0600 107 (236 77)    12/13/22 0600 107 (236 11)    12/12/22 0520 108 (238 54)        Invasive Devices     Central Venous Catheter Line  Duration           Port A Cath 03/10/22 Right Chest 279 days          Peripheral Intravenous Line  Duration           Peripheral IV 12/11/22 Left Antecubital 2 days          Drain  Duration           Ileostomy LUQ 26 days    Abscess Drain Abdomen 5 days    Cholecystostomy Tube 1 day              Physical Exam  Constitutional:       Appearance: He is obese  HENT:      Head: Normocephalic  Mouth/Throat:      Mouth: Mucous membranes are moist    Cardiovascular:      Rate and Rhythm: Normal rate and regular rhythm  Pulses: Normal pulses  Pulmonary:      Effort: Pulmonary effort is normal       Breath sounds: Normal breath sounds  Abdominal:      General: Bowel sounds are normal       Palpations: Abdomen is soft  Musculoskeletal:         General: No swelling  Cervical back: Neck supple  Skin:     General: Skin is warm and dry  Capillary Refill: Capillary refill takes less than 2 seconds  Comments: Abdominal incision dsg CDI with drain    Neurological:      Mental Status: He is alert and oriented to person, place, and time     Psychiatric:         Mood and Affect: Mood normal            LABORATORY RESULTS:      CBC with diff:   Results from last 7 days   Lab Units 12/14/22  0522 12/13/22  0523 12/12/22  0509 12/11/22  2126 12/11/22  1051 12/11/22  0648 12/09/22  0541 12/08/22  0440   WBC Thousand/uL 11 86* 14 37* 13 22*  --  13 09* 12 67* 21 51* 28 29*   HEMOGLOBIN g/dL 8 0* 8 6* 8 0* 7 4* 6 6* 6 4* 8 2* 9 2*   HEMATOCRIT % 26 0* 27 5* 25 5* 23 6* 20 9* 20 3* 25 9* 29 2*   MCV fL 84 83 85  --  86 87 87 88   PLATELETS Thousands/uL 318 335 293  --  297 296 416* 416*   MCH pg 25 8* 26 1* 26 6*  --  27 3 26 6* 27 6 27 6   MCHC g/dL 30 8* 31 3* 31 4  --  31 6 30 5* 31 7 31 5   RDW % 18 0* 17 9* 17 7*  --  16 0* 16 3* 16 7* 16 2*   MPV fL 9 7 9 7 9 8  --  9 5 10 3 10 6 10 7   NRBC AUTO /100 WBCs  --   --   --   --   --   --   --  0       CMP:  Results from last 7 days   Lab Units 12/14/22  0522 12/13/22  0523 12/12/22  0509 12/11/22  0648 12/10/22  1232 12/09/22  1635 12/09/22  0541   POTASSIUM mmol/L 3 7 3 9 3 5 3 6 3 9 5 0 4 4   CHLORIDE mmol/L 104 105 107 106 107 109* 106   CO2 mmol/L 27 24 25 21 20* 16* 19*   BUN mg/dL 16 18 22 26* 27* 25 22   CREATININE mg/dL 1 43* 1 62* 1 95* 2 10* 1 94* 1 71* 1 46*   CALCIUM mg/dL 8 8 8 8 8 3 8 0* 7 8* 7 9* 8 0*   AST U/L  --   --   -- 96* 122*  --   --    ALT U/L  --   --   --  32 37  --   --    ALK PHOS U/L  --   --   --  994* 961*  --   --    EGFR ml/min/1 73sq m 53 46 36 33 37 43 52       BMP:  Results from last 7 days   Lab Units 12/14/22  0522 12/13/22  0523 12/12/22  0509 12/11/22  0648 12/10/22  1232 12/09/22  1635 12/09/22  0541   POTASSIUM mmol/L 3 7 3 9 3 5 3 6 3 9 5 0 4 4   CHLORIDE mmol/L 104 105 107 106 107 109* 106   CO2 mmol/L 27 24 25 21 20* 16* 19*   BUN mg/dL 16 18 22 26* 27* 25 22   CREATININE mg/dL 1 43* 1 62* 1 95* 2 10* 1 94* 1 71* 1 46*   CALCIUM mg/dL 8 8 8 8 8 3 8 0* 7 8* 7 9* 8 0*          No results found for: NTBNP         Results from last 7 days   Lab Units 12/11/22  0648 12/09/22  0541   MAGNESIUM mg/dL 1 9 1 8                         Lipid Profile:   Lab Results   Component Value Date    CHOL 91 (L) 04/10/2017    CHOL 97 (L) 07/18/2016     Lab Results   Component Value Date    HDL 51 08/19/2022    HDL 45 08/26/2021    HDL 34 (L) 04/10/2021     Lab Results   Component Value Date    LDLCALC 77 08/19/2022    LDLCALC 65 08/26/2021    LDLCALC 55 04/10/2021     Lab Results   Component Value Date    TRIG 157 (H) 08/19/2022    TRIG 75 08/26/2021    TRIG 90 04/10/2021     Cardiac testing:   No results found for this or any previous visit  No results found for this or any previous visit  No valid procedures specified  No results found for this or any previous visit  Imaging:   XR chest portable    Result Date: 12/14/2022  Narrative: CHEST INDICATION:   Chest pain 5/10, mid sternal, does not radiate  COMPARISON:  12/8/2022 CT EXAM PERFORMED/VIEWS:  XR CHEST PORTABLE Images: 3 FINDINGS: Progressive bilateral opacities suspicious for CHF and small left pleural effusion Right IJ port terminates at cavoatrial junction, unchanged Cardiomediastinal silhouette appears unremarkable  No pneumothorax Osseous structures appear within normal limits for patient age       Impression: Progressive bilateral opacities suspicious for CHF and small left pleural effusion Workstation performed: [id number]     XR chest portable    Result Date: 12/7/2022  Narrative: CHEST INDICATION:   dyspnea on minimal exertion; to r/o atelectasis  COMPARISON:  None EXAM PERFORMED/VIEWS:  XR CHEST PORTABLE FINDINGS:  Right chest port catheter tip in the cavoatrial region  Cardiomediastinal silhouette appears unremarkable  No pneumothorax  Haziness obscuring hemidiaphragm, cardiac silhouette and costophrenic angle  The lungs are otherwise clear  Osseous structures appear within normal limits for patient age  Impression: Mild to moderate left effusion  Left lung base parenchymal process cannot be excluded  Workstation performed: [id number]     XR chest portable    Result Date: 11/18/2022  Narrative: CHEST INDICATION:   hypoxia  COMPARISON:  Chest radiograph and CT chest November 16, 2022  EXAM PERFORMED/VIEWS:  XR CHEST PORTABLE FINDINGS:  Endotracheal tube in satisfactory position 4 cm above the jeremiah  Venous infusion device terminates in region of cavoatrial junction  Nasogastric tube tip and sidehole terminates below the diaphragm  Cardiomediastinal silhouette appears unremarkable  There is increased left pleural effusion and associated consolidation in the left lung base consistent with a large left pleural effusion  Patchy opacity medial right lung base likely represents atelectasis improved since November 16, 2022  Osseous structures appear within normal limits for patient age  Impression: Increasing left pleural effusion with associated atelectasis/consolidation  Lines and tubes in satisfactory position  Findings marked for immediate notification in Epic  Workstation performed: [id number]     CT head wo contrast    Result Date: 11/29/2022  Narrative: CT BRAIN - WITHOUT CONTRAST INDICATION:   Delirium Mental status change, persistent or worsening Increased delirium, AMS  R/O METs  COMPARISON:  MRI dated 11/10/2022   TECHNIQUE:  CT examination of the brain was performed  In addition to axial images, sagittal and coronal 2D reformatted images were created and submitted for interpretation  Radiation dose length product (DLP) for this visit:  901 5 mGy-cm   This examination, like all CT scans performed in the [facility], was performed utilizing techniques to minimize radiation dose exposure, including the use of iterative reconstruction and automated exposure control  IMAGE QUALITY:  Diagnostic  FINDINGS: PARENCHYMA:  No intracranial mass, mass effect or midline shift  No CT signs of acute infarction  No acute parenchymal hemorrhage  VENTRICLES AND EXTRA-AXIAL SPACES:  Normal for the patient's age  VISUALIZED ORBITS AND PARANASAL SINUSES:  Orbits are unremarkable  Small mucous retention cyst in the left maxillary sinus  Stable partially imaged benign-appearing lucent lesion in the left maxilla protruding into the floor of the left maxillary sinus  There is an associated unerupted tooth which favors dentigerous cyst  CALVARIUM AND EXTRACRANIAL SOFT TISSUES:  Normal      Impression: No acute intracranial abnormality  Workstation performed: [id number]     CT chest abdomen pelvis w contrast    Result Date: 12/8/2022  Narrative: CT CHEST, ABDOMEN AND PELVIS WITH IV CONTRAST INDICATION:   Evaluation for EC fistula, evaluation for pneumonia, pleural effusions    COMPARISON:  CT chest/abdomen/pelvis dated 11/21/2022  TECHNIQUE: CT examination of the chest, abdomen and pelvis was performed  Axial, sagittal, and coronal 2D reformatted images were created from the source data and submitted for interpretation  Radiation dose length product (DLP) for this visit:  1475 19 mGy-cm   This examination, like all CT scans performed in the [facility], was performed utilizing techniques to minimize radiation dose exposure, including the use of iterative reconstruction and automated exposure control   IV Contrast:  100 mL of iohexol (OMNIPAQUE) Enteric Contrast: Enteric contrast was not administered  FINDINGS: CHEST LUNGS:  There is bilateral lower lobe airspace consolidation with associated air bronchograms consistent with pneumonia  There is no tracheal or endobronchial lesion  PLEURA:  Small bilateral pleural effusions  HEART/GREAT VESSELS: Heart is unremarkable for patient's age  No thoracic aortic aneurysm  MEDIASTINUM AND NITA:  Unremarkable  CHEST WALL AND LOWER NECK:  Unremarkable  ABDOMEN LIVER/BILIARY TREE:  Again demonstrated are multiple low-attenuation lesions throughout the liver consistent with known metastatic disease, similar to multiple priors  Similar capsular retraction along the inferior right hepatic lobe  Postsurgical changes of prior left segment 3 hepatectomy  There is a mildly enlarged fluid collection adjacent to the hepatectomy site now measuring 8 1 x 7 1 x 8 0 cm, previously 6 8 x 6 6 x 8 2 cm when remeasured in a similar fashion  Volume of air within the collection is also mildly increased  GALLBLADDER:  No calcified gallstones  There is new significant gallbladder distention now measuring up to 13 x 7 cm with associated gallbladder wall thickening/edema  There is a new focal fluid collection contiguous with the gallbladder fossa measuring 6 8 x 5 2 cm  SPLEEN:  Unremarkable  PANCREAS:  Unremarkable  ADRENAL GLANDS:  Unremarkable  KIDNEYS/URETERS:  Unremarkable  No hydronephrosis  STOMACH AND BOWEL:  No evidence for bowel obstruction  The patient is again noted to be status post partial colectomy with diverging loop ileostomy  APPENDIX:  No findings to suggest appendicitis  ABDOMINOPELVIC CAVITY: Previously visualized left-sided drainage catheter has been removed in the interim  There is new small extraluminal paracolic gas with associated complex fluid which appears contiguous with the colonic suture line, best seen on axial images 60-73, series 2 worrisome for postoperative leak   Mild rim-enhancing focal gas and fluid collection in close proximity measuring 4 4 x 2 4 cm (image 67, series 2) is suggestive of developing abscess  Small volume abdominopelvic ascites and diffuse mesenteric edema is similar to prior  No lymphadenopathy  VESSELS:  Unremarkable for patient's age  PELVIS REPRODUCTIVE ORGANS:  Unremarkable for patient's age  URINARY BLADDER:  Unremarkable  ABDOMINAL WALL/INGUINAL REGIONS:  There are new small air locules within the midline abdominal wound which appear to track to the anterior surface of bowel, best seen on sagittal series 602, series 105 worrisome for enterocutaneous fistula  Mild body wall edema  OSSEOUS STRUCTURES:  No acute fracture or destructive osseous lesion  Impression: New findings of small left-sided extraluminal paracolic gas with small associated complex fluid which in close proximity to the colonic suture line; postoperative leak not excluded  Mild rim-enhancing left abdominal focal gas and fluid collection in close proximity measuring 4 4 x 2 4 cm suggestive of developing abscess  Smaller 2 7 x 2 0 cm gas and fluid-containing rim-enhancing fluid collection in the left anterior abdomen (image 66, series 2) also suggestive of abscess  New significant gallbladder distention now measuring up to 13 x 7 cm with associated gallbladder wall thickening/edema  New focal fluid collection contiguous with the gallbladder fossa measuring 6 8 x 5 2 cm  Mildly enlarged fluid collection adjacent to the segment 3 hepatectomy site now measuring 8 1 x 7 1 x 8 0 cm, previously 6 8 x 6 6 x 8 2 cm  Volume of air within the collection is also increased  New small air locules within the midline abdominal wound which appear to track to the anterior surface of bowel worrisome for enterocutaneous fistula  Small volume abdominopelvic ascites and diffuse mesenteric edema, similar to prior  Bilateral lower lobe pneumonia  Small bilateral pleural effusions  The study was marked in [facility] for immediate notification  Workstation performed: [id number]     CT chest abdomen pelvis w contrast    Result Date: 11/16/2022  Narrative: CT CHEST, ABDOMEN AND PELVIS WITH IV CONTRAST INDICATION:   s/p colectomy, rule out anastomotic leak versus intra-abdominal abscess  62yo M POD8 s/p exploratory laparotomy, transverse colon resection, reversal of loop colostomy, peritoneal biopsy, and segment 3 hepatic resection w/ a post-operative course complicated by a LUQ hematoma requiring blood transfusion and hypoxic respiratory failure w/ recurrent encephalopathy requiring endotracheal intubation (now extubated)  History of colon cancer and hepatic metastases  COMPARISON:  CT PE study 11/13/2022  CTA chest abdomen pelvis 11/09/2017  TECHNIQUE: CT examination of the chest, abdomen and pelvis was performed  Axial, sagittal, and coronal 2D reformatted images were created from the source data and submitted for interpretation  Radiation dose length product (DLP) for this visit:  2265 98 mGy-cm   This examination, like all CT scans performed in the [facility], was performed utilizing techniques to minimize radiation dose exposure, including the use of iterative reconstruction and automated exposure control  IV Contrast:  100 mL of iohexol (OMNIPAQUE) Enteric Contrast: Enteric contrast was administered  FINDINGS: CHEST LUNGS:  Redemonstration of mucous in the distal trachea and right mainstem bronchus extending into the bronchus intermedius and occluding the right upper lobe bronchus  Some improved aeration of the right upper lobe with persistent atelectasis  Some increased subsegmental atelectasis in the medial right middle lobe  Similar atelectasis in the right lower lobe and remainder of the right middle lobe  Complete collapse of the left lower lobe, and there is atelectasis in the left upper lobe and lingula do not appear significantly changed from prior   PLEURA:  Small bilateral pleural effusions with loculated component along the right major fissure is not significantly changed  HEART/GREAT VESSELS: Thoracic aortic, annular, and coronary artery calcification  Heart is prominent in size  No thoracic aortic aneurysm  Right sided vascular port tip at the cavoatrial junction  MEDIASTINUM AND NITA:  Redemonstration of fluid in the inferior mediastinum just anterior to the descending aorta with some extension caudally along the esophagus and a small hiatal hernia, volume does not appear significantly changed ,similar density from prior exam, slightly greater than simple fluid  Subcentimeter lymph nodes redemonstrated  CHEST WALL AND LOWER NECK:  Unremarkable  ABDOMEN LIVER/BILIARY TREE:  Redemonstration of multiple areas of decreased attenuation throughout the liver largest within the right lobe consistent previously metastatic disease  Similar capsular retraction along the inferior right lobe of the liver  Postsurgical changes of the left lobe of the liver redemonstrated with interspersed areas of gas, volume of gas appears decreased from prior  No discrete enhancing rim or hyperemia in the adjacent liver parenchyma  GALLBLADDER:  Distended containing vicariously excreted contrast  SPLEEN:  Unremarkable  PANCREAS:  Unremarkable  ADRENAL GLANDS:  Unremarkable  KIDNEYS/URETERS:  Complex right renal cyst redemonstrated  Mild bilateral perinephric stranding, nonspecific  No hydronephrosis  STOMACH AND BOWEL: Status post resection of the transverse colon with takedown of colostomy  There is a similar appearance of the anastomosis as well as dilation of the proximal colon measuring up to 11 5 cm at the level of the cecum  Sigmoid colon is mildly prominent and fluid-filled similar from prior  Air-fluid level in the descending colon which is not dilated  Small bowel is not dilated  APPENDIX:  No findings to suggest appendicitis   ABDOMINOPELVIC CAVITY: There is a new gas containing somewhat tubular structure at the level of the colonic anastomosis which extends cranially to the greater curvature of the stomach and adjacent to gastric fluid the majority of which is hyperdense and similar from prior study on 11-13 2022 and likely represents a component of left upper quadrant hematoma  Gas containing structure is best appreciated on series 601 images 52-73 and series 602 image 81/89  Similar volume ascites greatest in the upper abdomen  There appears to be some layering areas of increased density in the fluid greatest in the right paracolic gutter suspected to represent blood products  Heterogeneous in attenuation hematoma in the left upper quadrant similar compared to prior when measured in the coronal plane  Heterogeneous fluid along the left paracolic gutter redemonstrated  Fluid within the mesentery not significantly changed  Pneumoperitoneum appears resolved  3 mm soft tissue nodule in the right retroperitoneum 2/71  VESSELS:  Unremarkable for patient's age  PELVIS REPRODUCTIVE ORGANS:  Unremarkable for patient's age  URINARY BLADDER:  Decompressed by a Negro catheter balloon inflated  ABDOMINAL WALL/INGUINAL REGIONS:  Midline abdominal skin staples and right lower quadrant skin staples  Small amounts of subcutaneous fluid deep to the right lower quadrant skin staples similar from prior  Mild body wall edema  Fat-containing left inguinal inguinal hernia  OSSEOUS STRUCTURES:  No acute fracture or destructive osseous lesion  Degenerative changes  Bridging osteophytes at bilateral sacroiliac joints  Impression: There is a new somewhat tubular gas density area extending cranially from the mid transverse colon anastomosis as described most concerning for leak  Left upper quadrant heterogeneous hematoma is not significantly changed compared to prior  Similar appearance of the colonic anastomosis with proximal colonic dilation   Some improved aeration of the right upper lobe with persistent left lower lobe atelectasis and bilateral areas of partial and subsegmental atelectasis, mucus plugging in the right upper lobe, and bilateral pleural effusions  No significant change in the postsurgical changes in the left lobe liver  Additional findings as above  I personally discussed this study with Elmer Dorantes on 11/16/2022 at 11:07  Workstation performed: [id number]     IR cholecystostomy tube placement    Result Date: 12/9/2022  Narrative: PROCEDURE: Ultrasound-guided percutaneous cholecystostomy catheter placement STAFF: GEM Patel: Multiple  COMPLICATIONS: None  MEDICATIONS: 1% lidocaine subcutaneous  Antibiotics: The patient was already receiving antibiotics, with a current dose  INDICATION: Cholecystitis  PROCEDURE: Using ultrasound guidance, a needle was inserted into the gallbladder using a short transhepatic window  Next, a wire was advanced and coiled in the gallbladder  A 10 Romanian locking loop drainage catheter was then placed into the gallbladder and secured  using sutures  100 mL of bile was aspirated  The catheter was attached to gravity drainage  FINDINGS: 1  Initial ultrasound showed findings consistent with acute cholecystitis  2   Interval ultrasound showed good positioning of the needle, wire, and catheter within the gallbladder  Impression: Percutaneous cholecystostomy  Workstation performed: [id number]     XR chest portable ICU    Result Date: 11/21/2022  Narrative: CHEST INDICATION:   high PEEP requirement  COMPARISON:  11/19/2022 EXAM PERFORMED/VIEWS:  XR CHEST PORTABLE ICU FINDINGS: Endotracheal tube approximately 3 7 cm above the jeremiah  Enteric tube tip at the proximal stomach, sidehole in the region of the GE junction  Right chest wall port at the cavoatrial junction  Low lung volumes  Central and basilar interstitial changes most compatible with edema, mid left lung linear atelectasis and small to moderate bilateral pleural effusions    Left effusion appears decreased and right increased  No pneumothorax  Heart, mediastinal and hilar structures are within normal limits  No acute osseous or soft tissue pathology  Impression: Evidence of edema and pleural effusions  Low lung volumes with atelectasis  Support tubes and lines as above  Workstation performed: [id number]     XR chest portable ICU    Result Date: 11/19/2022  Narrative: CHEST INDICATION:   f/u effusion  COMPARISON:  11/18/2022 6:31 PM EXAM PERFORMED/VIEWS:  XR CHEST PORTABLE ICU  7:44 AM FINDINGS:  Endotracheal tube in satisfactory position 3 cm above the jeremiah  Orogastric tube extends below the diaphragm but the tip is not imaged  Right chest wall implantable port enters the internal jugular vein and has its tip at the cavoatrial junction  The heart is mildly enlarged  Mediastinum appears unremarkable  Continued increase opacification left hemithorax identified consistent with small-moderate effusion and likely underlying atelectasis and/or pneumonia  Mild subsegmental atelectasis in the medial mid right lung again noted  No pneumothorax is appreciated  Osseous structures appear within normal limits for patient age  Impression: 1  Opacification left mid lower lung zones due to small-moderate pleural effusion and underlying atelectasis and/or pneumonia  Mild subsegmental atelectasis right midlung  2   Lines and tubes as noted  Workstation performed: [id number]     XR chest portable ICU    Result Date: 11/16/2022  Narrative: CHEST INDICATION:   s/p intubation  COMPARISON:  11/14/2022  EXAM PERFORMED/VIEWS:  XR CHEST PORTABLE ICU Images:  1 FINDINGS:  Endotracheal tube seen with its distal tip 3 2 cm above the jeremiah  There is a gastric tube seen with its distal tip in the proximal stomach  Right chest wall kiana catheter in place as before  Overlying EKG wire leads  Cardiomediastinal silhouette appears unremarkable  There is persistent left lower lobe consolidation and small effusion  No pneumothorax  Impression: Interval placement of endotracheal tube and gastric tube as above  Persistent left lower lobe consolidation and effusion  Workstation performed: [id number]     CT pe study w abdomen pelvis w contrast    Result Date: 11/22/2022  Narrative: CT PULMONARY ANGIOGRAM OF THE CHEST AND CT ABDOMEN AND PELVIS WITH INTRAVENOUS CONTRAST INDICATION:   Persistent fevers, rule out abscess, PE  COMPARISON:  None  TECHNIQUE:  CT examination of the chest, abdomen and pelvis was performed  Thin section CT angiographic technique was used in the chest in order to evaluate for pulmonary embolus and coronal 3D MIP postprocessing was performed on the acquisition scanner  Axial, sagittal, and coronal 2D reformatted images were created from the source data and submitted for interpretation  Radiation dose length product (DLP) for this visit:  2219 mGy-cm   This examination, like all CT scans performed in the [facility], was performed utilizing techniques to minimize radiation dose exposure, including the use of iterative reconstruction and automated exposure control  IV Contrast:  98 mL of iohexol (OMNIPAQUE) 50 mL of iohexol (OMNIPAQUE) Enteric Contrast:  Enteric contrast was not administered  FINDINGS: CHEST PULMONARY ARTERIAL TREE:  No pulmonary embolus is seen  LUNGS:  Dependent lower lobe atelectasis is present  There is no tracheal or endobronchial lesion  PLEURA:  Moderate right and small left pleural effusions are present  HEART/AORTA:  Heart is unremarkable for patient's age  No thoracic aortic aneurysm  MEDIASTINUM AND NITA:  Endotracheal tube is present in appropriate position  CHEST WALL AND LOWER NECK:  Right chest wall port catheter is present  ABDOMEN LIVER: Stable hepatic metastatic disease is again identified with associated capsular retraction  Postoperative changes involving the left lobe liver are again identified    6 8 x 5 8 cm postoperative fluid collection is identified with decreasing postoperative gas noted  GALLBLADDER:  No calcified gallstones  No pericholecystic inflammatory change  Excreted contrast is identified within the bladder  SPLEEN:  Unremarkable  PANCREAS:  Unremarkable  ADRENAL GLANDS:  Unremarkable  KIDNEYS/URETERS:  One or more simple renal cyst(s) is noted  Otherwise unremarkable kidneys  No hydronephrosis  STOMACH AND BOWEL:  Nasogastric tube tip lies within the stomach  Patient is again status post partial colectomy  Interval decrease in bowel distention noted  APPENDIX:  No findings to suggest appendicitis  ABDOMINOPELVIC CAVITY:  Interval evacuation of previously identified left upper quadrant hematoma is identified  Postoperative drainage catheter is in place  Previously noted intra-abdominal fluid collections are resolved  Small volume of ascites is present without conclusive evidence of organized collection identified  Small amount of postoperative free air is identified  Drainage catheter is seen extending along the anterior aspect of the left abdominal wall as well  No significant collections are identified about the drainage catheters  Postoperative changes involving the mesentery noted  VESSELS:  Unremarkable for patient's age  PELVIS REPRODUCTIVE ORGANS:  Unremarkable for patient's age  URINARY BLADDER:  Negro catheter is present  ABDOMINAL WALL/INGUINAL REGIONS:  Unremarkable  OSSEOUS STRUCTURES:  No acute fracture or destructive osseous lesion  Impression: No evidence of pulmonary embolus  Moderate right and small left pleural effusions with associated dependent lower lobe atelectasis  Interval evacuation of previously identified intra-abdominal hematoma and previously noted extraluminal collection with placement of drainage catheters  No evidence of residual organized collection identified  Small volume of ascites present  Stable appearance of hepatic metastatic disease as well as postoperative changes involving the liver   Workstation performed: [id number]     US right upper quadrant    Result Date: 12/8/2022  Narrative: RIGHT UPPER QUADRANT ULTRASOUND INDICATION:     Dilated gallbladder on CT with wall thickening  COMPARISON:  CT chest abdomen pelvis from earlier today TECHNIQUE:   Real-time ultrasound of the right upper quadrant was performed with a curvilinear transducer with both volumetric sweeps and still imaging techniques  FINDINGS: PANCREAS:  Visualized portions of the pancreas are within normal limits  AORTA AND IVC:  Visualized portions are normal for patient age  LIVER: Size:  Within normal range  The liver measures 19 2 cm in the midclavicular line  Contour:  Lobulated  Parenchyma:  Heterogeneous  Known metastases are better visualized on the comparison CT  Limited imaging of the main portal vein shows it to be patent and hepatopetal   BILIARY: Gallbladder distention and mild wall thickening with pericholecystic fluid  Gallbladder sludge without calculi  Negative sonographic Campbell's sign  No intrahepatic biliary dilatation  CBD measures 5 0 mm  No choledocholithiasis  KIDNEY: Right kidney measures 10 7 x 5 0 x 5 5 cm  Volume 153 0 mL Kidney within normal limits  ASCITES:   Small quantity of abdominal ascites better visualized on the CT  Impression: Gallbladder wall thickening possibly reactive to the adjacent hepatic disease  Gallbladder pericholecystic fluid is nonspecific in the setting of abdominal ascites  Gallbladder distention is also nonspecific if the patient has not eaten  Negative sonographic Campbell's sign  Workstation performed: [id number]     IR drainage tube placement    Result Date: 12/9/2022  Narrative: PROCEDURE: Ultrasound-guided abscess drainage and catheter placement STAFF: GEM Garcia  CONTRAST: None  NUMBER OF IMAGES: Multiple  COMPLICATIONS: None  MEDICATIONS: 1% lidocaine subcutaneous  Antibiotics: The patient was already receiving antibiotics, with a current dose   INDICATION: Recent segment 3 hepatectomy complicated by abscess  PROCEDURE: Using ultrasound guidance, a needle was inserted into the collection  A wire was advanced and coiled in the collection  Following tract dilation, a 10 Latvian drainage catheter was advanced and secured using sutures  100 mL of pus was removed and sent for  culture and sensitivity  The catheter was attached to gravity drainage  FINDINGS: 1  Initial ultrasound redemonstrated the isoechoic collection within the hepatectomy bed  2   Interval ultrasound showed good positioning of the needle, wire, and catheter within the collection  Impression: Ultrasound-guided abscess drainage catheter placement  Workstation performed: [id number]     US bedside procedure    Result Date: 11/21/2022  Narrative: 1 2 [id number] 2 323 [id number]    IR cholecystostomy tube check/change/reposition/reinsertion/upsize    Result Date: 12/13/2022  Narrative: Cholecystostomy tube check and change Clinical History: Acute cholecystitis  24 hours has had blood and clots in bag  Contrast: 20 mL of Omnipaque  Fluoro time: 2 7 minutes Number of Images: 6 Conscious sedation time: N/A Technique: The patient was brought to the interventional radiology suite and placed supine on the table  The existing percutaneous catheter was prepped and draped in the usual sterile fashion  After a  view was obtained, contrast was injected into the catheter and multiple images were obtained  Findings: Cholecystostomy tube has been retracted slightly back or from body of the gallbladder  Intervention: The existing catheter was transected and removed over a heavy-duty wire  A new 10 Latvian therapeutic cholecystostomy catheter was inserted over the wire, and the loop formed and locked  The catheter was sutured in place and connected to a gravity bag  Impression: Impression: Slightly retracted cholecystostomy tube, tube exchange over the wire into better position within the body of the gallbladder  Signed, performed, and dictated by GAURANG Ferrer under the supervision of Dr Declan Rod  Workstation performed: [id number]     Thank you for allowing us to participate in this patient's care  This pt will follow up with          once discharged  Counseling / Coordination of Care  Total floor / unit time spent today 45 minutes  Greater than 50% of total time was spent with the patient and / or family counseling and / or coordination of care  A description of the counseling / coordination of care: Review of history, current assessment, development of a plan  Code Status: Level 1 - Full Code    ** Please Note: Dragon 360 Dictation voice to text software may have been used in the creation of this document   **

## 2022-12-14 NOTE — [facility] ADMISSION
Notified by CM that patient is medically stable for discharge  Spoke with Brittani Molina at [facility], and authorization request received has been updated, with start date of 12/14 with auth #: [id number]  CM has been updated  Patient will admit to [address] [place] [address] [id number] with pickup time at 2:30pm  Report can be called to   CM has been updated

## 2022-12-15 ENCOUNTER — TRANSITIONAL CARE MANAGEMENT (OUTPATIENT)
Dept: INTERNAL MEDICINE CLINIC | Facility: CLINIC | Age: 57
End: 2022-12-15

## 2022-12-15 LAB
ABO GROUP BLD: NORMAL
ANION GAP SERPL CALCULATED.3IONS-SCNC: 6 MMOL/L (ref 4–13)
BASOPHILS # BLD AUTO: 0.03 THOUSANDS/ÂΜL (ref 0–0.1)
BASOPHILS NFR BLD AUTO: 0 % (ref 0–1)
BLD GP AB SCN SERPL QL: NEGATIVE
BUN SERPL-MCNC: 15 MG/DL (ref 5–25)
CALCIUM SERPL-MCNC: 8.4 MG/DL (ref 8.3–10.1)
CHLORIDE SERPL-SCNC: 102 MMOL/L (ref 96–108)
CO2 SERPL-SCNC: 26 MMOL/L (ref 21–32)
CREAT SERPL-MCNC: 1.38 MG/DL (ref 0.6–1.3)
EOSINOPHIL # BLD AUTO: 0 THOUSAND/ÂΜL (ref 0–0.61)
EOSINOPHIL NFR BLD AUTO: 0 % (ref 0–6)
ERYTHROCYTE [DISTWIDTH] IN BLOOD BY AUTOMATED COUNT: 18 % (ref 11.6–15.1)
GFR SERPL CREATININE-BSD FRML MDRD: 55 ML/MIN/1.73SQ M
GLUCOSE P FAST SERPL-MCNC: 135 MG/DL (ref 65–99)
GLUCOSE SERPL-MCNC: 135 MG/DL (ref 65–140)
GLUCOSE SERPL-MCNC: 145 MG/DL (ref 65–140)
GLUCOSE SERPL-MCNC: 166 MG/DL (ref 65–140)
GLUCOSE SERPL-MCNC: 192 MG/DL (ref 65–140)
GLUCOSE SERPL-MCNC: 199 MG/DL (ref 65–140)
GLUCOSE SERPL-MCNC: 215 MG/DL (ref 65–140)
HCT VFR BLD AUTO: 23.5 % (ref 36.5–49.3)
HGB BLD-MCNC: 7.3 G/DL (ref 12–17)
IMM GRANULOCYTES # BLD AUTO: 0.27 THOUSAND/UL (ref 0–0.2)
IMM GRANULOCYTES NFR BLD AUTO: 2 % (ref 0–2)
LYMPHOCYTES # BLD AUTO: 2.19 THOUSANDS/ÂΜL (ref 0.6–4.47)
LYMPHOCYTES NFR BLD AUTO: 20 % (ref 14–44)
MCH RBC QN AUTO: 26.3 PG (ref 26.8–34.3)
MCHC RBC AUTO-ENTMCNC: 31.1 G/DL (ref 31.4–37.4)
MCV RBC AUTO: 85 FL (ref 82–98)
MONOCYTES # BLD AUTO: 1.4 THOUSAND/ÂΜL (ref 0.17–1.22)
MONOCYTES NFR BLD AUTO: 13 % (ref 4–12)
NEUTROPHILS # BLD AUTO: 7.34 THOUSANDS/ÂΜL (ref 1.85–7.62)
NEUTS SEG NFR BLD AUTO: 65 % (ref 43–75)
NRBC BLD AUTO-RTO: 0 /100 WBCS
PLATELET # BLD AUTO: 321 THOUSANDS/UL (ref 149–390)
PMV BLD AUTO: 9.9 FL (ref 8.9–12.7)
POTASSIUM SERPL-SCNC: 3.9 MMOL/L (ref 3.5–5.3)
RBC # BLD AUTO: 2.78 MILLION/UL (ref 3.88–5.62)
RH BLD: POSITIVE
SODIUM SERPL-SCNC: 134 MMOL/L (ref 135–147)
SPECIMEN EXPIRATION DATE: NORMAL
WBC # BLD AUTO: 11.23 THOUSAND/UL (ref 4.31–10.16)

## 2022-12-15 PROCEDURE — 30233N1 TRANSFUSION OF NONAUTOLOGOUS RED BLOOD CELLS INTO PERIPHERAL VEIN, PERCUTANEOUS APPROACH: ICD-10-PCS | Performed by: PHYSICAL MEDICINE & REHABILITATION

## 2022-12-15 RX ORDER — INSULIN LISPRO 100 [IU]/ML
1-6 INJECTION, SOLUTION INTRAVENOUS; SUBCUTANEOUS
Status: DISCONTINUED | OUTPATIENT
Start: 2022-12-15 | End: 2023-01-17 | Stop reason: HOSPADM

## 2022-12-15 RX ORDER — INSULIN LISPRO 100 [IU]/ML
1-5 INJECTION, SOLUTION INTRAVENOUS; SUBCUTANEOUS
Status: DISCONTINUED | OUTPATIENT
Start: 2022-12-15 | End: 2023-01-17 | Stop reason: HOSPADM

## 2022-12-15 RX ADMIN — INSULIN LISPRO 2 UNITS: 100 INJECTION, SOLUTION INTRAVENOUS; SUBCUTANEOUS at 13:37

## 2022-12-15 RX ADMIN — INSULIN LISPRO 1 UNITS: 100 INJECTION, SOLUTION INTRAVENOUS; SUBCUTANEOUS at 17:09

## 2022-12-15 RX ADMIN — ACETAMINOPHEN 975 MG: 325 TABLET, FILM COATED ORAL at 05:05

## 2022-12-15 RX ADMIN — INSULIN LISPRO 3 UNITS: 100 INJECTION, SOLUTION INTRAVENOUS; SUBCUTANEOUS at 13:36

## 2022-12-15 RX ADMIN — INSULIN LISPRO 3 UNITS: 100 INJECTION, SOLUTION INTRAVENOUS; SUBCUTANEOUS at 09:00

## 2022-12-15 RX ADMIN — HEPARIN SODIUM 5000 UNITS: 5000 INJECTION INTRAVENOUS; SUBCUTANEOUS at 05:05

## 2022-12-15 RX ADMIN — METHOCARBAMOL 500 MG: 500 TABLET ORAL at 08:59

## 2022-12-15 RX ADMIN — METHOCARBAMOL 500 MG: 500 TABLET ORAL at 17:26

## 2022-12-15 RX ADMIN — TAMSULOSIN HYDROCHLORIDE 0.4 MG: 0.4 CAPSULE ORAL at 15:41

## 2022-12-15 RX ADMIN — Medication 1 TABLET: at 08:59

## 2022-12-15 RX ADMIN — PANTOPRAZOLE SODIUM 40 MG: 40 TABLET, DELAYED RELEASE ORAL at 05:05

## 2022-12-15 RX ADMIN — HEPARIN SODIUM 5000 UNITS: 5000 INJECTION INTRAVENOUS; SUBCUTANEOUS at 13:36

## 2022-12-15 RX ADMIN — CHOLECALCIFEROL TAB 10 MCG (400 UNIT) 400 UNITS: 10 TAB at 09:12

## 2022-12-15 RX ADMIN — ERTAPENEM SODIUM 1000 MG: 1 INJECTION, POWDER, LYOPHILIZED, FOR SOLUTION INTRAMUSCULAR; INTRAVENOUS at 20:36

## 2022-12-15 RX ADMIN — OXYCODONE HYDROCHLORIDE 10 MG: 10 TABLET ORAL at 15:41

## 2022-12-15 RX ADMIN — HEPARIN SODIUM 5000 UNITS: 5000 INJECTION INTRAVENOUS; SUBCUTANEOUS at 21:19

## 2022-12-15 RX ADMIN — ACETAMINOPHEN 975 MG: 325 TABLET, FILM COATED ORAL at 14:50

## 2022-12-15 RX ADMIN — ACETAMINOPHEN 975 MG: 325 TABLET, FILM COATED ORAL at 21:17

## 2022-12-15 RX ADMIN — PANTOPRAZOLE SODIUM 40 MG: 40 TABLET, DELAYED RELEASE ORAL at 15:41

## 2022-12-15 RX ADMIN — INSULIN LISPRO 3 UNITS: 100 INJECTION, SOLUTION INTRAVENOUS; SUBCUTANEOUS at 17:09

## 2022-12-15 RX ADMIN — PANTOPRAZOLE SODIUM 40 MG: 40 TABLET, DELAYED RELEASE ORAL at 08:59

## 2022-12-15 RX ADMIN — ATORVASTATIN CALCIUM 40 MG: 40 TABLET, FILM COATED ORAL at 08:59

## 2022-12-15 RX ADMIN — AMLODIPINE BESYLATE 5 MG: 5 TABLET ORAL at 08:59

## 2022-12-15 RX ADMIN — INSULIN GLARGINE 12 UNITS: 100 INJECTION, SOLUTION SUBCUTANEOUS at 21:22

## 2022-12-15 RX ADMIN — INSULIN LISPRO 1 UNITS: 100 INJECTION, SOLUTION INTRAVENOUS; SUBCUTANEOUS at 21:22

## 2022-12-15 RX ADMIN — ASPIRIN 81 MG CHEWABLE TABLET 81 MG: 81 TABLET CHEWABLE at 08:59

## 2022-12-15 RX ADMIN — AMLODIPINE BESYLATE 5 MG: 5 TABLET ORAL at 17:27

## 2022-12-15 NOTE — PROGRESS NOTES
OCCUPATIONAL THERAPY   ACUTE REHAB EVALUATION TAA    Active Problem List:   Patient Active Problem List   Diagnosis   • Type 2 diabetes mellitus without complication, with long-term current use of insulin ([facility] )   • Benign essential hypertension   • Mixed hyperlipidemia   • Erectile dysfunction   • Low testosterone   • Obesity (BMI 30-39  9)   • Testicular hypogonadism   • Incarcerated umbilical hernia   • Left ureteral calculus   • Type 2 diabetes mellitus with hyperlipidemia (HCC)   • Colonic mass   • Microcytic anemia   • Hypokalemia   • Transaminitis   • Thrombocytosis   • Iron deficiency anemia, unspecified   • Malignant neoplasm of transverse colon (HCC)   • 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 liver [facility])   • Colon cancer metastasized to liver [facility])   • Colostomy prolapse (HCC)   • Other fatigue   • Cervical radiculopathy   • Encephalopathy   • Flash pulmonary edema (HCC)   • MR (mitral regurgitation)   • Bacteremia   • ESBL (extended spectrum beta-lactamase) producing bacteria infection   • Acute respiratory failure with hypoxia (HCC)   • Severe protein-calorie malnutrition (HCC)   • Acute kidney injury ([facility] )     Past Medical Hx:   Past Medical History:   Diagnosis Date   • Abdominal pain 03/12/2022   • Acute renal failure ([place] [address] )     [id number] resolved   • Cancer ([facility] )    • Diabetes mellitus ([facility] )    • Enteritis 08/23/2016   • Gastroparesis due to DM ([facility] ) 08/23/2016   • GERD (gastroesophageal reflux disease)    • Hernia, ventral 08/04/2016   • Hyperlipidemia    • Hypertension    • Morbid obesity ([facility] ) 04/17/2018   • Postoperative visit 03/02/2022   • SIRS (systemic inflammatory response syndrome) ([facility] ) 03/12/2022   • Snoring    • Stage 3a chronic kidney disease ([facility] ) 02/19/2022     Past Surgical Hx:   Past Surgical History:   Procedure Laterality Date   • COLONOSCOPY     • COLOSTOMY N/A 02/20/2022    Procedure: COLOSTOMY LOOP, diverting;  Surgeon: Lisa Franco MD;  Location: [facility];  Service: General • ESOPHAGOGASTRODUODENOSCOPY N/A 08/24/2016    Procedure: ESOPHAGOGASTRODUODENOSCOPY (EGD); Surgeon: Miracle Lopez MD;  [facility]: [facility];   Service:    • EXPLORATORY LAPAROTOMY W/ BOWEL RESECTION N/A 11/16/2022    Procedure: LAPAROTOMY EXPLORATORY W/ BOWEL RESECTION- VAC PLACEMENT;  Surgeon: Uche Kruger MD;  [facility]: [facility];  Service: Surgical Oncology   • EXPLORATORY LAPAROTOMY W/ BOWEL RESECTION N/A 11/17/2022    Procedure: LAPAROTOMY EXPLORATORY W/ BOWEL RESECTION;  Surgeon: Uche Kruger MD;  [facility];  Service: Surgical Oncology   • ILEOSTOMY N/A 11/17/2022    Procedure: ILEOSTOMY;  Surgeon: Uche Kruger MD;  [facility];  Service: Surgical Oncology   • ILEOSTOMY CLOSURE N/A 11/7/2022    Procedure: REVERSAL COLOSTOMY;  Surgeon: Kasia Rodriguez MD;  [facility];  Service: Surgical Oncology   • IR CHOLECYSTOSTOMY TUBE CHECK/CHANGE/REPOSITION/REINSERTION/UPSIZE  12/12/2022   • IR CHOLECYSTOSTOMY TUBE PLACEMENT  12/8/2022   • IR DRAINAGE TUBE PLACEMENT  12/8/2022   • IR PORT PLACEMENT  03/10/2022   • KIDNEY STONE SURGERY     • LIVER BIOPSY LAPAROSCOPIC N/A 02/20/2022    Procedure: DIAGNOSTIC LAPAROSCOPIC LIVER BIOPSY, DIVERTING LOOP COLOSTOMY ;  Surgeon: Sandy Crowe MD;  Location: [facility];  Service: General   • LIVER LOBECTOMY N/A 11/7/2022    Procedure: SEGMENT 3 LIVER RESECTION, ABLATION, INTRAOPERATIVE U/S OF LIVER, PERITONEAL BIOPSY;  Surgeon: Kasia Rodriguez MD;  [facility];  Service: Surgical Oncology   • RIGHT COLON RESECTION N/A 11/7/2022    Procedure: EX LAP, TRANVERSE COLON RESECTION;  Surgeon: Kasia Rodriguez MD;  [facility];  Service: Surgical Oncology   • TONSILLECTOMY     • UMBILICAL HERNIA REPAIR LAPAROSCOPIC N/A 04/26/2019    Procedure: LAPAROSCOPIC UMBILICAL HERNIA REPAIR;  Surgeon: Sandy Crowe MD;  [facility];  Service: General   • VAC DRESSING APPLICATION N/A 36/19/6183    Procedure: APPLICATION VAC DRESSING ABDOMEN/TRUNK;  Surgeon: Nicholas Rodriguez MD;  [facility];  Service: Surgical Oncology        12/15/22 0705   Patient Data   Rehab Impairment a   Etiologic Diagnosis a   Support System   Name Pt reports residing with supportive wife, son and daughter who are teenagers  Pt reports that his wife does not work during the day and will be able to assist as able at d/c  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  Pt reports that he enjoys crusing with family and resting when he is not working  Home Setup   Type of Home Multi Level   Number of Stairs in Home   (Full flight bed/bath upstairs- walk in shower )   First Floor Bathroom Half   Second Floor Bathroom Full; Shower   First Floor Setup Available Yes   Baseline Information   Transportation    Prior Level of Function   Self-Care 3  Independent - Patient completed the activities by him/herself, with or without an assistive device, with no assistance from a helper  Indoor-Mobility (Ambulation) 3  Independent - Patient completed the activities by him/herself, with or without an assistive device, with no assistance from a helper  Stairs 3  Independent - Patient completed the activities by him/herself, with or without an assistive device, with no assistance from a helper  Functional Cognition 3  Independent - Patient completed the activities by him/herself, with or without an assistive device, with no assistance from a helper  Prior Device Used Z  None of the above   Falls in the Last Year   Number of falls in the past 12 months 0   Patient Preference   Nickname (Patient Preference) Fco Moreno   Psychosocial   Patient Behaviors/Mood Anxious; Fearful;Tearful  (Active listening and Emotional support provided   discussed visit with  and neuropsych )   Restrictions/Precautions   Precautions Fall Risk;Contact/isolation;Multiple lines;Supervision on toilet/commode  (Dillan drain, ileostomy, IR Drain)   Pain Assessment   Pain Assessment Tool 0-10   Pain Score 8   Pain Location/Orientation Location: Abdomen   Pain Onset/Description Onset: Sudden;Descriptor: Sharp -when completing bed mobility  Eating Assessment   Type of Assistance Needed Set-up / clean-up   Comment set up breakfast meal sitting in WC w/ tray table  pt does require increased time to manage  LOVETT noted w/ chewing  difficulty managing opening small items 2* 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  Decreased UE strength limiting abilty to bring fork to mouth without rest periods  Eating CARE Score 5   Oral Hygiene   Type of Assistance Needed Physical assistance   Physical Assistance Level 25% or less   Comment Slight assist for R UE assist for maintaining toothbrush to mouth  Oral Hygiene CARE Score 3   Tub/Shower Transfer   Findings Pt has tub shower and walk in shower  pts goal is to complete stairs to use walk in shower on the second floor  Shower/Bathe Self   Type of Assistance Needed Physical assistance   Physical Assistance Level 76% or more   Comment Pt compeltse sponge bathing sitting in WC at sink  Limited ability to complete full UB bathing 2* UE fatigue  can complete hair washing with increased time  able to compelte james bathing in sitting  assist for all LB  Shower/Bathe Self CARE Score 2   Dressing/Undressing Clothing   Don UB Clothes Pullover Shirt   Type of Assistance Needed Physical assistance   Physical Assistance Level 51%-75%   Comment assist for threading, donning over head and trunk     Upper Body Dressing CARE Score 2   Type of Assistance Needed Physical assistance   Physical Assistance Level Total assistance   Lower Body Dressing CARE Score 1   Putting On/Taking Off Footwear   Type of Assistance Needed Physical assistance   Physical Assistance Level Total assistance   Putting On/Taking Off Footwear CARE Score 1   Toileting Hygiene   Type of Assistance Needed Physical assistance Physical Assistance Level Total assistance   Toileting Hygiene CARE Score 1   Toilet Transfer   Type of Assistance Needed Physical assistance   Physical Assistance Level 76% or more   Toilet Transfer CARE Score 2   Bowel/Bladder Management   QI: Bladder Continence 1  Stress incontinence only   QI: Bowel Continence 9  Not rated, patient had an ostomy or did not have a bowel movement for the entire 3 days   Transfer Bed/Chair/Wheelchair   Findings SPT w/ RW to WC only  Type of Assistance Needed Physical assistance   Physical Assistance Level 51%-75%   Chair/Bed-to-Chair Transfer CARE Score 2   Sit to Lying   Type of Assistance Needed Physical assistance   Physical Assistance Level 51%-75%   Sit to Lying CARE Score 2   Lying to Sitting on Side of Bed   Type of Assistance Needed Physical assistance   Physical Assistance Level 51%-75%   Lying to Sitting on Side of Bed CARE Score 2   Sit to Stand   Type of Assistance Needed Physical assistance   Physical Assistance Level 51%-75%   Comment assit for rise and controlled decent to chair   Sit to Stand CARE Score 2   Comprehension   QI: Comprehension 4  Undestands: Clear comprehension without cues or repetitions   Expression   QI: Expression 4  Express complex messages without difficulty and with speech that is clear and easy to understan   RUE Assessment   RUE Assessment X  (pt is Right handed   further strength testing to be completed )   AROM - RUE   R Shoulder Flexion 3/4 range   R Shoulder Extension [facility]   R Shoulder ABduction WFL   R Shoulder ADduction WFL   R Shoulder Internal Rotation [facility]   R Shoulder External Rotation WFL   R Elbow Flexion WFL   R Elbow Extension WFL   R Wrist Flexion WFL   R Wrist Extension WFL   R Wrist ABduction WFL   R Wrist ADduction WFL   R Digit Flexion WFL   R Digit Extension WFL   LUE Assessment   LUE Assessment X   AROM - LUE   L Shoulder Flexion 3/4 range   L Shoulder Extension WFL   L Shoulder ABduction WFL   L Shoulder ADduction [facility] L Shoulder Internal Rotation WFL   L Shoulder External Rotation WFL   L Elbow Flexion WFL   L Elbow Extension WFL   L Wrist Flexion WFL   L Wrist Extension WFL   L Wrist ABduction WFL   L Wrist ADduction WFL   L Digit Flexion WFL   L Digit Extension WFL   Coordination   Movements are Fluid and Coordinated 0   Coordination and Movement Description Guarded with pain   Sensation   Light Touch Partial deficits in the RUE;Partial deficits in the LUE  (finger tips)   Sharp/Dull Partial deficits in the RUE;Partial deficits in the LUE  (finger tips)   Additional Comments impairs abilty to complete fine motor tasks   Cognition   Overall Cognitive Status WFL   Orientation Level Oriented X4   Comments Pt is cooperative and eager to participate  Pt is tearful at times reporting fears of movements, overall prognosis, fears of continued surgery  Pt does report times of slight delerium, but has been better  Pt reports that he has nightmares related to ICU stay and procedures  Vision   Vision Comments wears glasses fo reading   Perception   Inattention/Neglect Appears intact   Objective Measure   OT Findings /81 sitting  SPO2 on 1 L O2 93-95% during activity  SPO2 on RA 93-94% during activity  Discharge Information   Patient's Discharge Plan Home with supportive family   Patient's Rehab Expectations TO do the stairs in his home   Impressions Pt is a 61 y o  male seen for OT evaluation following 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  S/p 11/7/2022 Exploratory Laparotomy, 11/16/2022 Right hemicolectomy,11/17/2022 Re-exploration, partial descending colectomy, primary colocolonic anastomosis created with diverting loop ileostomy, midline wound VAC placement,12/8/2022 IR Percutaneous Cholecystostomy Tube Placement, Hepatectomy Abscess Drain Placement  OT evaluation and assessment of strength, ADL function, and functional transfers completed   Prior to admission Pt was completing ADLs, IADLs at independent with use of no DME  Pt lives with wife and is able to provide physical assistance at time of discharge  Pt reports 0  recent falls in the last 6 months  Personal factors affecting the patient at this time include: co-morbidities, mutliple lines, use of AD, pain, fall risk and assist w/ ADL's  Pt is currently limited due to the following deficits impacting occupational performance, activity tolerance, endurance, standing balance/tolerance, sitting balance/tolerance, UE strength, UE ROM, FMC, sensation  and SOB/LOVETT  The patient has shown a decline from prior level of function  The patient participates in identification of personal goals to address in Occupational Therapy  Recommend skilled OT services for I/ADL retraining to support the ability to safely participate in daily occupations safely and independently in the most least restrictive way  Pt demonstrates Good rehab potential to meet LTG's, Anticipate  3week(s) length of stay  Occupational Therapy plan of care  will address the following areas: ADL re-training, [employer] training, fxnl xfers, fxnl cognition, standing tolerance, standing balance, UE strengthening, UE endurance, FMC/GMC, FMS/GMS, DME training/education, family training/education, EC techniques/education, healthy coping education, leisure pursuits, core/trunk control and self perceived rate of exertion training     OT Therapy Minutes   OT Time In 7972   OT Time Out 0900   OT Total Time (minutes) 115   OT Mode of treatment - Individual (minutes) 115   OT Mode of treatment - Concurrent (minutes) 0   OT Mode of treatment - Group (minutes) 0   OT Mode of treatment - Co-treat (minutes) 0   OT Mode of Treatment - Total time(minutes) 115 minutes   OT Cumulative Minutes 115   Cumulative Minutes   Cumulative therapy minutes 115

## 2022-12-15 NOTE — WOUND OSTOMY CARE
Consult Note - Wound   Venkata Stalling 61 y o  male MRN: [id number]  Unit/Bed#: [facility] 740-29 Encounter: [id number]        History and Present Illness:  Patient is a 62 yo male that was admitted to [facility] for rehab post malignant neoplasm of transverse colon with ileostomy formation  Patient was seen by [facility] RN team inpatient  Patient has a PMH ofhypertension, hyperlipidemia, ventral hernia, GERD  Patient is a min assist for turning and repositioning  Patient reports continence of bowel and bladder- requires assistance with urinal- provided on assessment  On assessment, patient is lying supine on regular mattress  During assessment, both Dr Tung Wood and Tara Medel were present and visualized midline abdominal wound and ostomy  Wound Care was consulted for buttocks wound, ostomy teaching, and abdominal wound/ incision  Wound Assessment Findings:   B/L heels are dry intact and ramon with no skin loss or wounds present  Recommend continuing with preventative Hydraguard Cream and proper offloading/ repositioning  1  B/L Sacro-Buttocks is dry intact with scattered areas of intact scar tissue  Area all blanches with no skin loss or wounds present  Recommend preventative hydraguard to area  2  Midline Incision & Right Abdomen Wound: 2 Areas Measured together  Midline incision irregular in shape with full thickness skin loss  Wound bed is scattered 30% pink granulation tissue, 60% moist yellow slough tissue, and 10% soft black eschar  There are scattered islands of the slough and eschar throughout the granulation tissue  Cannot appreciate full depth related to slough tissue  Nisa-wound is intact and hyperpigmented  Edges are not rolled, no tunneling or undermining present  Moderate amount of yellow/serosanguineous drainage noted to area  Right abdomen wound is circular in shape with at least full thickness skin loss   Wound bed is 100% dark yellow moist slough tissue- cannot appreciate full depth related to slough tissue covering wound bed  Nisa-wound is intact, hyperpigmented with intact scar tissue at 3 & 9 o'clock  No tunneling or undermining noted  Moderate yellow/ serosanguineous drainage noted  Both wounds were cleansed with NS and lightly covered with mesalt and covered with ABDs  No induration, fluctuance, odor, warmth/temperature differences, redness, or purulence noted to the above noted wounds and skin areas assessed  New dressings applied per orders listed below  Patient tolerated well- no s/s of non-verbal pain or discomfort observed during the encounter  Bedside nurse aware of plan of care  See flow sheets for more detailed assessment findings  Orders listed below and wound care will continue to follow, call or tiger text with questions  Skin Care Plan:  1-Midline Incision and Right Abdomen Wounds: Cleanse wounds with NS and pat dry  Lightly place/ fluff Mesalt Ribbon into Wound bed  Cover with ABDs and secure with minimal tape on skin  Change Daily or PRN soilage or displacement  2-Turn/reposition q2h or when medically stable for pressure re-distribution on skin   3-Elevate heels to offload pressure  4-Moisturize skin daily with skin nourishing cream  5-Ehob cushion in chair when out of bed  6-Preventative Hydraguard to bilateral heels and bilateral sacro-buttocks BID and PRN  Ostomy Assessment:   When asked if patient knew ostomy pouch change steps, patient responded that he knows the steps well  Patient stated that he will be able to perform steps at home  Patient did not want ostomy teaching during assessment today and asked [id number]  RN to "Just change the pouch"  Patient reports not feeling well and inability to change pouch during today's visit  Additional ostomy supplies left at bedside for patient  Primary RN made aware  Stoma: Loop LUQ Ileostomy - Stoma measures approximately 4 5 cms or 1 5-1 75 inches  Round in shape and slightly budded   Small area of mucocutaneous separation noted from 8-10 o'clock  Measures 1cm in height and 0 3 in width  Distal Os produces effluent- points downwards  James-stomal skin intact  Proximal wound is dry, well adhered scab  Effluent appears more solid during today's assessment  Patient placed in regular on piece pouch  Ostomy Care:  1  Peel back pouch using push-pull method, may use non-alcohol adhesive remover(Purple and white package)  2  Use warm water only to cleanse skin around the stoma (james-stomal skin)  3  Make sure all adhesive residue is removed and skin is dry and not oily  4  Measure stoma size using measuring guide and trace correct measurements onto back of pouch  5  Then mold the backing of pouch out to correct shape/size  6  Use 3M no sting barrier film to prep the skin around the stoma  7  Place pouch over stoma and onto skin  8  Use warmth of hand to apply gentle pressure to help backing of pouch to adhere well to skin  9  Empty pouch when 1/3 full  10  Change pouch every 4-5 days or if signs of leaking  11  Will continue to follow patient while an inpatient for Ostomy teaching/education  Wounds:  Wound 11/07/22 Abdomen N/A (Active)   Wound Image   12/15/22 1118   Wound Description Granulation tissue;Pink;Yellow;Slough;Eschar 12/15/22 1118   James-wound Assessment Intact; Hyperpigmented 12/15/22 1118   Wound Length (cm) 24 cm 12/15/22 1118   Wound Width (cm) 8 5 cm 12/15/22 1118   Wound Depth (cm) 2 4 cm 12/15/22 1118   Wound Surface Area (cm^2) 204 cm^2 12/15/22 1118   Wound Volume (cm^3) 489 6 cm^3 12/15/22 1118   Calculated Wound Volume (cm^3) 489 6 cm^3 12/15/22 1118   Tunneling 0 cm 12/15/22 1118   Tunneling in depth located at 0 12/15/22 1118   Undermining 0 12/15/22 1118   Undermining is depth extending from 0 12/15/22 1118   Drainage Amount Moderate 12/15/22 1118   Drainage Description Yellow;Serosanguineous 12/15/22 1118   Non-staged Wound Description Full thickness 12/15/22 1118 Treatments Cleansed;Site care;Irrigation with NSS 12/15/22 1118   Dressing Mesalt;ABD 12/15/22 1118   Wound packed? No 12/15/22 1118   Dressing Changed New 12/15/22 1118   Patient Tolerance Tolerated well 12/15/22 1118   Dressing Status Clean;Dry; Intact 12/15/22 1118       Wound 12/04/22 Pressure Injury Buttocks Inner;Bilateral (Active)   Wound Image   12/15/22 1115   Wound Description Intact 12/15/22 1400   Pressure Injury Stage O 12/15/22 1400   Nisa-wound Assessment Scar Tissue 12/15/22 1400   Wound Length (cm) 0 cm 12/15/22 1400   Wound Width (cm) 0 cm 12/15/22 1400   Wound Depth (cm) 0 cm 12/15/22 1400   Wound Surface Area (cm^2) 0 cm^2 12/15/22 1400   Wound Volume (cm^3) 0 cm^3 12/15/22 1400   Calculated Wound Volume (cm^3) 0 cm^3 12/15/22 1400   Wound Site Closure DONNA 12/15/22 1400   Drainage Amount None 12/15/22 1400   Drainage Description Other (Comment) 12/15/22 1400   Non-staged Wound Description Not applicable 93/03/85 7872   Treatments Cleansed;Site care 12/15/22 1400   Dressing Moisture barrier 12/15/22 1400   Wound packed?  No 12/15/22 1400   Packing- # removed 0 12/15/22 1400   Packing- # inserted 0 12/15/22 1400   Dressing Changed New 12/15/22 1400   Patient Tolerance Tolerated well 12/15/22 1400   Dressing Status Intact 12/15/22 1400               Concepcion Buchanan RN, BSN

## 2022-12-15 NOTE — CASE MANAGEMENT
Attempted to meet pt to complete assessment however  He asked cm to return another time as he was tired, feeling lousy and not up to answering questions  Chart review completed, pt resides with wife and two teenage children in a multi level home with bedroom on the second floor and a bath on the first flr  Pt has had an extensive hospital stay with a one day stay at [facility]  They were not able to manage patient and transferred him back to the hospital  It is estimated that pt will have a 3 week los with a hopeful return home  Cm to follow up tomorrow to review team mtg update and insurance info

## 2022-12-15 NOTE — PROGRESS NOTES
PM&R PROGRESS NOTE:  Alena Carrington 61 y o  male MRN: [id number]  Unit/Bed#: -16 Encounter: [id number]        Rehabilitation Diagnosis: Impairment of mobility, safety and Activities of Daily Living (ADLs) due to Neurologic Conditions:  03 8  Neuromuscular Disorders  Etiology: Critical Illness Myopathy    HPI: Ajith Miranda is a 61 y o  male with a medical history of HTN, HLD, GERD, and ventral hernia that presented to [facility] on 11/7 for an elective surgical procedure due to metastatic colon adenocarcinoma by Dr Thomas Donohue  Patient present to [facility]/on 2/22 after CT abdomen showed transverse colonic apple core lesion and inumberable hepatic metastases  MRI revealed multiple hepatic metastasis with smaller lesions in left lobe and larger lesions in right lobe  Patient completed chemotherapy, colostomy at that time  On 11/7 patient underwent exploratory laparotomy, segment 3 liver resection, ablation, intraoperative ultrasound, and colostomy reversal  This was c/b left upper quadrant hematoma, imaging showed suspected leak from transverse colon anastomosis  On 11/16, patient underwent exp  Lap which revealed LUQ infected hematoma, defect in transverse colon anastomosis with extravasation of succus  Massively dilated cecum requiring resection  He had a right hemicolectomy, left in discontinuity and temporary abdominal closure  On 11/17, re-exploration, partial descending colectomy, primary colonic anastomosis created with diverting loop ileostomy and midline wound VAC placement  He completed course of IV abx for ESBL bacteremia from abdominal abscess  Midline abdominal incision wet to dry dressings and packing to old stoma site  12/6 abdominal incision noted to have yellow drainage  CT abdomen showed abdominal abscess and distended gallbladder  Patient underwent percutaneous cholecystostomy tube insertion and hepatectomy abscess drainage  ID consulted, IV abx for 1 week   Nephrology consulted d/t ALEXY, creatinine baseline 1 2-1 4  On 12/14, patient experienced chest pain with movement  Cardiology evaluated; troponin level 57; chest pain appeared musculoskeletal with no further work up  Patient deemed medically stable and admitted to [facility] on 12/14/2022  SUBJECTIVE: Patient seen face to face  No acute issues  Patient fatigued from am occupational therapy session  Slept well, denies pain  Discussed plan to access right chest wall port today  Current Hbg 7 3, MD consent, ordered type and cross and 1 unit PRBc with CBC 12/16  No CP, SOB, fever, chills, N/V/D, abdominal pain  ASSESSMENT: Stable, progressing    PLAN:    Rehabilitation  • Functional deficits:    • Continue current rehabilitation plan of care to maximize function      • Functional update:   o PT: Awaiting evaluation  o OT: ADL- Max A, UB dressing- Mod A, LB dressing- total A; toileting- total assist    • Estimated Discharge: ADD 3 weeks    Pain  • Tylenol, oxycodone    DVT prophylaxis  • Heparin sc    Bladder plan  • Continent, urinal    Bowel plan  • ileostomy      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)  Assessment & Plan  · MRI-multiple hepatic metastasis; smaller lesions in left lobe, larger lesions in right lobe  · 11/7 Exp Lap, resection of hepatic segment 3, resection of transverse colon with reversal of loop colostomy (Dr Gurjit Cannon)  · 11/16- right hemicolectomy, left discontinuity and temporary abdominal closure device placed  · 11/17- re-exploration, partial descending colectomy, primary colocolonic anastomosis with diverting loop ileostomy, midline VAC placement  · Abdominal incision- yellow drainage; CT showed abdominal abscess and distended gallbladder  · 12/8- IR percutaneous cholecystostomy tube, hepatectomy abscess drain placement  · Monitor incisions, dressings  · Monitor CBC, CMP  · IM consulted for assistance with management  · Follow-up outpatient with hem/onc    * Malignant neoplasm of transverse colon ([facility] [address] )  Assessment & Plan  · Transverse colonic apple core lesion and innumerable hepatic metastases  · Colostomy 2/22  · RCW port-a-cath  · S/p palliative chemo  · Follows with Dr Monika Cox (palliative)  · Follows hem/onc (Dr Desi Dominguez)    Acute kidney injury [facility])  Assessment & Plan  · Creatinine baseline 1 2-1 4  · Current creatinine 1 38  · Consider consult nephrology  · Monitor BMP    Bacteremia  Assessment & Plan  · Abdominal abscess- ESBL E Coli  · Ertapenam 7 days (12/18)  · Contact precautions  · VICTORINO drain- milky drainage  · Monitor CBC    Iron deficiency anemia, unspecified  Assessment & Plan  · Hbg 7 3  · Consent obtained  · 1 unit PRBc  · Monitor CBC    Obesity (BMI 30-39  9)  Assessment & Plan  · BMI 33 0  · Diet and lifestyle modifications  · Nutrition consult    Benign essential hypertension  Assessment & Plan  · Home: Lisinopril  · Here: Norvasc 5 mg BID  · Adjust medication as needed  · IM consult for assistance with management  · Monitored outpatient by PCP    Type 2 diabetes mellitus without complication, with long-term current use of insulin (HCC)  Assessment & Plan  · HbgA1c 8 0 (9/22)  · Home monitoring with Master Villanueva  · Home: Lantus 12 units, Humalog 3 units, Januvia  · Here: Humalog 3 units with meals, SSI, Lantus   · Follow with PCP (Dr Zaheer Couch)        [address] [facility] medical co-management  Labs, medications, and imaging personally reviewed  ROS:  A ten point review of systems was completed on 12/15/22 and pertinent positives are listed in subjective section  All other systems reviewed were negative  Review of Systems     OBJECTIVE:   /81 Comment: P 90  Pulse 88   Temp 97 5 °F (36 4 °C) (Oral)   Resp 18   Ht 5' 10" (1 778 m)   Wt 104 kg (230 lb)   SpO2 96%   BMI 33 00 kg/m²     Physical Exam  Constitutional:       Appearance: He is obese  He is ill-appearing  HENT:      Head: Normocephalic and atraumatic        Mouth/Throat:      Mouth: Mucous membranes are moist  Cardiovascular:      Rate and Rhythm: Normal rate and regular rhythm  Pulses: Normal pulses  Heart sounds: Normal heart sounds  Pulmonary:      Effort: Pulmonary effort is normal       Breath sounds: Normal breath sounds  Abdominal:      General: Bowel sounds are normal       Palpations: Abdomen is soft  Tenderness: There is abdominal tenderness  Comments: + ileostomy, right + percutaneous radha, left +VICTORINO drain   Musculoskeletal:      Cervical back: Normal range of motion  Right lower leg: Edema present  Left lower leg: Edema present  Skin:     General: Skin is warm and dry  Capillary Refill: Capillary refill takes less than 2 seconds  Findings: Bruising present  Comments: Midline abdominal wound, RLQ wound x2   Neurological:      Mental Status: He is alert and oriented to person, place, and time     Psychiatric:         Mood and Affect: Mood normal           Lab Results   Component Value Date    WBC 11 23 (H) 12/15/2022    HGB 7 3 (L) 12/15/2022    HCT 23 5 (L) 12/15/2022    MCV 85 12/15/2022     12/15/2022     Lab Results   Component Value Date    SODIUM 134 (L) 12/15/2022    K 3 9 12/15/2022     12/15/2022    CO2 26 12/15/2022    BUN 15 12/15/2022    CREATININE 1 38 (H) 12/15/2022    GLUC 135 12/15/2022    CALCIUM 8 4 12/15/2022     Lab Results   Component Value Date    INR 1 10 11/12/2022    INR 1 24 (H) 11/09/2022    INR 1 0 09/26/2022    PROTIME 14 4 11/12/2022    PROTIME 15 8 (H) 11/09/2022    PROTIME 10 6 09/26/2022       Current Facility-Administered Medications:   •  acetaminophen (TYLENOL) tablet 975 mg, 975 mg, Oral, Q8H [address], GAURANG Keith, 975 mg at 12/15/22 0505  •  amLODIPine (NORVASC) tablet 5 mg, 5 mg, Oral, BID, GAURANG Keith, 5 mg at 12/15/22 7884  •  aspirin chewable tablet 81 mg, 81 mg, Oral, Daily, GAURANG Keith, 81 mg at 12/15/22 0859  •  atorvastatin (LIPITOR) tablet 40 mg, 40 mg, Oral, Daily, Bard Melendez GAURANG, 40 mg at 12/15/22 0859  •  calcium carbonate (TUMS) chewable tablet 500 mg, 500 mg, Oral, BID PRN, GAURANG Keith, 500 mg at 12/14/22 2230  •  cholecalciferol (VITAMIN D3) tablet 400 Units, 400 Units, Oral, Daily, GAURANG Keith, 400 Units at 12/15/22 0912  •  ertapenem (INVanz) 1,000 mg in sodium chloride 0 9 % 50 mL IVPB, 1,000 mg, Intravenous, Q24H, GAURANG Keith, Last Rate: 100 mL/hr at 12/14/22 2118, 1,000 mg at 12/14/22 2118  •  heparin (porcine) subcutaneous injection 5,000 Units, 5,000 Units, Subcutaneous, Q8H [facility], GAURANG Keith, 5,000 Units at 12/15/22 0505  •  insulin glargine (LANTUS) subcutaneous injection 12 Units 0 12 mL, 12 Units, Subcutaneous, HS, GAURANG Keith  •  insulin lispro (HumaLOG) 100 units/mL subcutaneous injection 1-5 Units, 1-5 Units, Subcutaneous, HS, GAURANG Keith  •  insulin lispro (HumaLOG) 100 units/mL subcutaneous injection 1-6 Units, 1-6 Units, Subcutaneous, TID AC **AND** Fingerstick Glucose (POCT), , , TID AC, GAURANG Keith  •  insulin lispro (HumaLOG) 100 units/mL subcutaneous injection 3 Units, 3 Units, Subcutaneous, TID With Meals, GAURANG Keith, 3 Units at 12/15/22 0900  •  methocarbamol (ROBAXIN) tablet 500 mg, 500 mg, Oral, BID, GAURANG Keith, 500 mg at 12/15/22 0859  •  multivitamin-minerals (CENTRUM) tablet 1 tablet, 1 tablet, Oral, Daily, GAURANG Keith, 1 tablet at 12/15/22 0859  •  ondansetron (ZOFRAN-ODT) dispersible tablet 4 mg, 4 mg, Oral, Q6H PRN, GAURANG Keith  •  oxyCODONE (ROXICODONE) immediate release tablet 10 mg, 10 mg, Oral, Q6H PRN, GAURANG Keith, 10 mg at 12/14/22 2226  •  oxyCODONE (ROXICODONE) IR tablet 5 mg, 5 mg, Oral, Q6H PRN, GAURANG Keith  •  pantoprazole (PROTONIX) EC tablet 40 mg, 40 mg, Oral, BID AC, GAURANG Keith, 40 mg at 12/15/22 0859  •  tamsulosin (FLOMAX) capsule 0 4 mg, 0 4 mg, Oral, Daily With Dinner, GAURANG Rodgers, 0 4 mg at 12/14/22 1638    Past Medical History:   Diagnosis Date   • Abdominal pain 03/12/2022   • Acute renal failure ([place] [address] )     [id number] resolved   • Cancer ([place] [address] )    • Diabetes mellitus ([place] [address] )    • Enteritis 08/23/2016   • Gastroparesis due to DM ([place] [address] ) 08/23/2016   • GERD (gastroesophageal reflux disease)    • Hernia, ventral 08/04/2016   • Hyperlipidemia    • Hypertension    • Morbid obesity ([place] [address] ) 04/17/2018   • Postoperative visit 03/02/2022   • SIRS (systemic inflammatory response syndrome) ([place] [address] ) 03/12/2022   • Snoring    • Stage 3a chronic kidney disease ([place] [address] ) 02/19/2022       Patient Active Problem List    Diagnosis Date Noted   • 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 liver ([place] [address] ) 03/02/2022   • Malignant neoplasm of transverse colon ([place] [address] ) 03/01/2022   • Severe protein-calorie malnutrition ([place] [address] ) 12/14/2022   • Acute kidney injury ([place] [address] ) 12/14/2022   • Flash pulmonary edema ([place] [address] ) 11/12/2022   • MR (mitral regurgitation) 11/12/2022   • Bacteremia 11/12/2022   • ESBL (extended spectrum beta-lactamase) producing bacteria infection 11/12/2022   • Acute respiratory failure with hypoxia ([place] [address] ) 11/12/2022   • Encephalopathy 11/09/2022   • Cervical radiculopathy 10/26/2022   • Other fatigue 06/15/2022   • Colostomy prolapse ([place] [address] ) 05/27/2022   • Colon cancer metastasized to liver ([place] [address] ) 03/12/2022   • Iron deficiency anemia, unspecified 03/01/2022   • Thrombocytosis 02/21/2022   • Hypokalemia 02/19/2022   • Transaminitis 02/19/2022   • Type 2 diabetes mellitus with hyperlipidemia ([place] [address] ) 02/18/2022   • Colonic mass 02/18/2022   • Microcytic anemia 02/18/2022   • Left ureteral calculus 01/30/2020   • Incarcerated umbilical hernia 97/50/0841   • Testicular hypogonadism 06/19/2017   • Low testosterone 05/30/2017   • Type 2 diabetes mellitus without complication, with long-term current use [facility] insulin [facility] ) 08/23/2016   • Benign essential hypertension 08/23/2016   • Mixed hyperlipidemia 08/23/2016   • Erectile dysfunction 07/11/2016   • Obesity (BMI 30-39  9) 07/11/2016      GAURANG Burton  Physical Medicine and Chris Luu    Total visit time: 35 minutes, with more than 50% spent counseling/coordinating care  Counseling includes discussion with patient re: progress in therapies, functional issues observed by therapy staff, and discussion with patient regarding their current medical state and wellbeing  Coordination of patient's care was performed in conjunction with Internal Medicine service to monitor patient's labs, vitals, and management of their comorbidities

## 2022-12-15 NOTE — PROGRESS NOTES
Internal Medicine Progress Note  Patient: Luis Wayne  Age/sex: 61 y o  male  Medical Record #: [id number]      ASSESSMENT/PLAN: (Interval History)  Luis Wayne is seen and examined and management for following issues:    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  • s/p hepatic resection and reversal of colostomy on 11/7  • Return to the OR 11/16 for right hemicolectomy with partial descending colectomy colocolonic anastomosis with loop ileostomy and mid line abdominal VAC placement  • Possible fistula formation  • Continue local wound care  • Monitor fever/CBC/wound  • 12/14:  Ileostomy = 1050ml, radha tube = 50ml, VICTORINO drain = 40ml     Acute cholecystitis  • s/p percutaneous tube placement  • Currently draining bloody discharge  • IR evaluated 12/14 states may remain bloody for several days  • No further abdominal pain     Bacteremia/abdominal abscess  • Consult ID to follow  • Currently on Ertapenem for ESBL E  Coli with LD 12/15  • VICTORINO drain currently with thick milky drainage  • Stable; no fever     Hypertension  • On Norvasc 5mg BID  • stable     Diabetes mellitus  • Continue Lantus 12U qhs/Lispro 3U TID  • Continue diabetic diet and QID Accuchecks with SSI  • Stable  • No changes today     Acute on chronic renal failure  • Stage III; baseline 1 4  • Lisinopril currently on hold  • Stable today at 1 38     Anemia  • Multifactorial due to recent infection, surgery, CKD stage III  • Transfuse today for hemoglobin 7 3     Atypical chest pain  • With elevation in troponin/EKG changes  • Cardiology cleared pt for discharge  • Did not recommend any further work up  • On 12/15 had CP = Cards saw and felt was M/S     Situational depression  • Recommend neuropsychology consult  • Patient not interested in medications at this point in time     Malnutrition  • Recommend dietician consultation to optimize wound healing  • Glucerna makes him nauseated  • Breads etc make him too full  • Will order double protein       Discharge date:  Team      The above assessment and plan was reviewed and updated as determined by my evaluation of the patient on 12/15/2022      Labs:   Results from last 7 days   Lab Units 12/15/22  0516 12/14/22  0522   WBC Thousand/uL 11 23* 11 86*   HEMOGLOBIN g/dL 7 3* 8 0*   HEMATOCRIT % 23 5* 26 0*   PLATELETS Thousands/uL 321 318     Results from last 7 days   Lab Units 12/15/22  0516 12/14/22  0522   SODIUM mmol/L 134* 136   POTASSIUM mmol/L 3 9 3 7   CHLORIDE mmol/L 102 104   CO2 mmol/L 26 27   BUN mg/dL 15 16   CREATININE mg/dL 1 38* 1 43*   CALCIUM mg/dL 8 4 8 8             Results from last 7 days   Lab Units 12/15/22  0646 12/14/22 2110 12/14/22  1617   POC GLUCOSE mg/dl 145* 96 107       Review of Scheduled Meds:  Current Facility-Administered Medications   Medication Dose Route Frequency Provider Last Rate   • acetaminophen  975 mg Oral Q8H [address] GAURANG Keith     • amLODIPine  5 mg Oral BID GAURANG Keith     • aspirin  81 mg Oral Daily GAURANG Keith     • atorvastatin  40 mg Oral Daily GAURANG Keith     • calcium carbonate  500 mg Oral BID PRN GAURANG Marcos     • cholecalciferol  400 Units Oral Daily GAURANG Keith     • ertapenem  1,000 mg Intravenous Q24H GAURANG Keith 1,000 mg (12/14/22 2118)   • heparin (porcine)  5,000 Units Subcutaneous Q8H [address] GAURANG Keith     • insulin glargine  12 Units Subcutaneous HS GAURANG Keith     • insulin lispro  1-5 Units Subcutaneous HS GAURANG Keith     • insulin lispro  1-6 Units Subcutaneous TID With Meals GAURANG Keith     • insulin lispro  3 Units Subcutaneous TID With Meals GAURANG Keith     • methocarbamol  500 mg Oral BID GAURANG Keith     • multivitamin-minerals  1 tablet Oral Daily GAURANG Keith     • ondansetron  4 mg Oral Q6H PRN GAURANG Keith     • oxyCODONE  10 mg Oral Q6H PRN GAURANG Keith     • oxyCODONE  5 mg Oral Q6H PRN Chiki Bravo, GAURANG     • pantoprazole  40 mg Oral BID AC GAURANG Keith     • tamsulosin  0 4 mg Oral Daily With Dinner GAURANG Keith         Subjective/ HPI: Patient seen and examined  Patients overnight issues or events were reviewed with nursing or staff during rounds or morning huddle session  New or overnight issues include the following:     No new or overnight issues  Offers no complaints    ROS:   A 10 point ROS was performed; negative except as noted above  Imaging:     No orders to display       *Labs /Radiology studies reviewed  *Medications reviewed and reconciled as needed  *Please refer to order section for additional ordered labs studies  *Case discussed with primary attending during morning huddle case rounds    Physical Examination:  Vitals:   Vitals:    12/14/22 1527 12/14/22 2045 12/15/22 0501 12/15/22 0859   BP: 156/80 169/71 145/80 153/81   BP Location: Left arm Left arm Right arm    Pulse: 98 100 88    Resp: 18 20 18    Temp: 98 1 °F (36 7 °C) 98 4 °F (36 9 °C) 97 5 °F (36 4 °C)    TempSrc: Oral Oral Oral    SpO2: 96% 94% 96%    Weight: 104 kg (230 lb)      Height: 5' 10" (1 778 m)          General Appearance: no distress, conversive  HEENT: PERRLA, conjuctiva normal; oropharynx clear; mucous membranes moist   Neck:  Supple, normal ROM  Lungs: CTA, normal respiratory effort, no retractions, expiratory effort normal  CV: regular rate and rhythm; no rubs/murmurs/gallops, PMI normal   ABD: soft; ND/NT; +BS  Midline incision dressed  VICTORINO draining tan milky fluid  Perc radha tube is bloody as before  EXT: no edema  Skin: normal turgor, normal texture, no rashes  Psych: affect/mood flat and depressed  Neuro: AAO      The above physical exam was reviewed and updated as determined by my evaluation of the patient on 12/15/2022      Invasive Devices     Central Venous Catheter Line  Duration           Port A Cath 03/10/22 Right Chest 279 days          Peripheral Intravenous Line  Duration Peripheral IV 12/11/22 Left Antecubital 3 days          Drain  Duration           Ileostomy LUQ 27 days    Abscess Drain Abdomen 6 days    Cholecystostomy Tube 2 days                   VTE Pharmacologic Prophylaxis: Heparin  Code Status: Level 1 - Full Code  Current Length of Stay: 1 day(s)      Total time spent:  30 minutes with more than 50% spent counseling/coordinating care  Counseling includes discussion with patient re: progress  and discussion with patient of his/her current medical state/information  Coordination of patient's care was performed in conjunction with primary service  Time invested included review of patient's labs, vitals, and management of their comorbidities with continued monitoring  In addition, this patient was discussed with medical team including physician and advanced extenders  The care of the patient was extensively discussed and appropriate treatment plan was formulated unique for this patient  Medical decision making for the day was made by supervising physician unless otherwise noted in their attestation statement  ** Please Note:  voice to text software may have been used in the creation of this document   Although proof errors in transcription or interpretation are a potential of such software**

## 2022-12-15 NOTE — PROGRESS NOTES
Occupational Therapy Long Term Goals     12/15/22 0705   Rehab Team Goals   ADL Team Goal Patient will require assist with ADLs with least restrictive device upon completion of rehab program   Rehab Team Interventions   OT Interventions Self Care   Eating Goal   Eating Goal 06  Independent - Patient completes the activity by him/herself with no assistance from a helper  Status Target goal - three weeks; Ongoing   Grooming Goal   Oral Hygiene Goal 06  Independent - Patient completes the activity by him/herself with no assistance from a helper  Task Brush Teeth   Environment Seated at Sink   Status Ongoing; Target goal - three weeks   Tub/Shower Transfer Goal   Method Shower Stall  (MIN A)   Status Ongoing; Target goal - three weeks   Bathing Goal   Shower/bathe self Goal 04  Supervision or touching assistance- [place] provides VERBAL CUES or supervision throughout activity  Status Ongoing; Target goal - three weeks   Upper Body Dressing Goal   Upper body dressing Goal 04  Supervision or touching assistance- [place] provides VERBAL CUES or supervision throughout activity  Status Ongoing; Target goal - three weeks   Lower Body Dressing Goal   Lower body dressing Goal 04  Supervision or touching assistance- [place] provides VERBAL CUES or supervision throughout activity  Putting on/taking off footwear Goal 03  Partial/moderate assistance - [place] does less than half the effort  [place] lifts or holds trunk or limbs and provides more than half the effort  Status Ongoing; Target goal - three weeks   Toileting Transfer Goal   Toilet transfer Goal 04  Supervision or touching assistance- [place] provides VERBAL CUES or supervision throughout activity  Status Ongoing; Target goal - three weeks   Toileting Goal   Toileting hygiene Goal 04  Supervision or touching assistance- [place] provides VERBAL CUES or supervision throughout activity  Status Ongoing; Target goal - three weeks   Comprehension Goal   Comprehension Assist Level Moderate [place]   Status Ongoing; Target goal - two weeks   Expression Goal   Expression Assist Level Independant   Status Ongoing; Target goal - three weeks   Medication Management   Assist Level Supervision   Status Ongoing; Target goal - three weeks

## 2022-12-15 NOTE — PLAN OF CARE
Problem: Prexisting or High Potential for Compromised Skin Integrity  Goal: Skin integrity is maintained or improved  Description: INTERVENTIONS:  - Identify patients at risk for skin breakdown  - Assess and monitor skin integrity  - Assess and monitor nutrition and hydration status  - Monitor labs   - Assess for incontinence   - Turn and reposition patient  - Assist with mobility/ambulation  - Relieve pressure over bony prominences  - Avoid friction and shearing  - Provide appropriate hygiene as needed including keeping skin clean and dry  - Evaluate need for skin moisturizer/barrier cream  - Collaborate with interdisciplinary team   - Patient/family teaching  - Consider wound care consult   Outcome: Progressing     Problem: Potential for Falls  Goal: Patient will remain free of falls  Description: INTERVENTIONS:  - Educate patient/family on patient safety including physical limitations  - Instruct patient to call for assistance with activity   - Consult OT/PT to assist with strengthening/mobility   - Keep Call bell within reach  - Keep bed low and locked with side rails adjusted as appropriate  - Keep care items and personal belongings within reach  - Initiate and maintain comfort rounds  - Make Fall Risk Sign visible to staff  - Offer Toileting every 2 Hours, in advance of need  - Initiate/Maintain alarm  - Obtain necessary fall risk management equipment:   - Apply yellow socks and bracelet for high fall risk patients  - Consider moving patient to room near nurses station  Outcome: Progressing     Problem: PAIN - ADULT  Goal: Verbalizes/displays adequate comfort level or baseline comfort level  Description: Interventions:  - Encourage patient to monitor pain and request assistance  - Assess pain using appropriate pain scale  - Administer analgesics based on type and severity of pain and evaluate response  - Implement non-pharmacological measures as appropriate and evaluate response  - Consider cultural and social influences on pain and pain management  - Notify physician/advanced practitioner if interventions unsuccessful or patient reports new pain  Outcome: Progressing     Problem: INFECTION - ADULT  Goal: Absence or prevention of progression during hospitalization  Description: INTERVENTIONS:  - Assess and monitor for signs and symptoms of infection  - Monitor lab/diagnostic results  - Monitor all insertion sites, i e  indwelling lines, tubes, and drains  - Monitor endotracheal if appropriate and nasal secretions for changes in amount and color  - [place] appropriate cooling/warming therapies per order  - Administer medications as ordered  - Instruct and encourage patient and family to use good hand hygiene technique  - Identify and instruct in appropriate isolation precautions for identified infection/condition  Outcome: Progressing     Problem: SAFETY ADULT  Goal: Patient will remain free of falls  Description: INTERVENTIONS:  - Educate patient/family on patient safety including physical limitations  - Instruct patient to call for assistance with activity   - Consult OT/PT to assist with strengthening/mobility   - Keep Call bell within reach  - Keep bed low and locked with side rails adjusted as appropriate  - Keep care items and personal belongings within reach  - Initiate and maintain comfort rounds  - Make Fall Risk Sign visible to staff  - Offer Toileting every 2 Hours, in advance of need  - Initiate/Maintain alarm  - Obtain necessary fall risk management equipment:   - Apply yellow socks and bracelet for high fall risk patients  - Consider moving patient to room near nurses station  Outcome: Progressing  Goal: Maintain or return to baseline ADL function  Description: INTERVENTIONS:  -  Assess patient's ability to carry out ADLs; assess patient's baseline for ADL function and identify physical deficits which impact ability to perform ADLs (bathing, care of mouth/teeth, toileting, grooming, dressing, etc )  - Assess/evaluate cause of self-care deficits   - Assess range of motion  - Assess patient's mobility; develop plan if impaired  - Assess patient's need for assistive devices and provide as appropriate  - Encourage maximum independence but intervene and supervise when necessary  - Involve family in performance of ADLs  - Assess for home care needs following discharge   - Consider OT consult to assist with ADL evaluation and planning for discharge  - Provide patient education as appropriate  Outcome: Progressing  Goal: Maintains/Returns to pre admission functional level  Description: INTERVENTIONS:  - Perform BMAT or MOVE assessment daily    - Set and communicate daily mobility goal to care team and patient/family/caregiver  - Collaborate with rehabilitation services on mobility goals if consulted  - Perform Range of Motion 3 times a day  - Reposition patient every 2 hours    - Dangle patient 3 times a day  - Stand patient 3 times a day  - Ambulate patient 3 times a day  - Out of bed to chair 3 times a day   - Out of bed for meals 3 times a day  - Out of bed for toileting  - Record patient progress and toleration of activity level   Outcome: Progressing     Problem: DISCHARGE PLANNING  Goal: Discharge to home or other facility with appropriate resources  Description: INTERVENTIONS:  - Identify barriers to discharge w/patient and caregiver  - Arrange for needed discharge resources and transportation as appropriate  - Identify discharge learning needs (meds, wound care, etc )  - Arrange for interpretive services to assist at discharge as needed  - Refer to Case Management Department for coordinating discharge planning if the patient needs post-hospital services based on physician/advanced practitioner order or complex needs related to functional status, cognitive ability, or social support system  Outcome: Progressing

## 2022-12-15 NOTE — ASSESSMENT & PLAN NOTE
· Hbg = 7 9 (previous 8 0)  · 12/15-1 unit PRBc  · 12/16- symptomatic- 1 unit PRBc  · 12/28- 1 unit PRBc  · Monitor CBC

## 2022-12-15 NOTE — PROGRESS NOTES
12/15/22 1400   Patient Data   Rehab Impairment Impairment of mobility, safety and Activities of Daily Living (ADLs) due to Neurologic Conditions:  03 8  Neuromuscular Disorders   Etiologic Diagnosis Critical illness myopathy   Date of Onset 11/07/22   Support System   Name Pt resides with wife, Reinaldo Tinajero, and two teenage children  Wife does not work and will be able to assist as able at d/c  Home Setup   Type of Home Multi Level   Method of Entry Hand Rail Bilateral   Number of Stairs 2  (2 ADRIAN from garage (pt's preferred method of entry), 3 ADRIAN from front door)   Number of Stairs in Home   (FF to basement and FF to 2nd floor)   In Home Hand Rail Bilateral   First Floor Bathroom Half   Second Floor Bathroom Full; Shower;Tub  (walk-in shower + jacuzzi tub)   First Floor Setup Available Yes   Available Equipment   (none)   Baseline Information   Outpatient Services Received Prior to Admission Physical Therapy  (OPPT for 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)   GHANSHYAM Chaney 58 Work Full Time  ([employer], out of work since February 2022)   Transportation    Prior Device(s) Used   (none)   Prior Level of Function   Self-Care 3  Independent - Patient completed the activities by him/herself, with or without an assistive device, with no assistance from a helper  Indoor-Mobility (Ambulation) 3  Independent - Patient completed the activities by him/herself, with or without an assistive device, with no assistance from a helper  Stairs 3  Independent - Patient completed the activities by him/herself, with or without an assistive device, with no assistance from a helper  Functional Cognition 3  Independent - Patient completed the activities by him/herself, with or without an assistive device, with no assistance from a helper  Prior Device Used Z   None of the above   Falls in the Last Year   Number of falls in the past 12 months 0   Patient Preference   Nickname (Patient Preference) Guicho   Restrictions/Precautions   Precautions Fall Risk;Contact/isolation;Multiple lines;Supervision on toilet/commode  (VICTORINO drain, ileostomy, IR Drain)   Weight Bearing Restrictions No   ROM Restrictions No   Pain Assessment   Pain Assessment Tool 0-10   Pain Score 6  (0/10 abdominal incision pain at rest; 6/10 after bed-level activity)   Pain Location/Orientation Location: Abdomen   Pain Onset/Description Descriptor: [place]; Descriptor: Discomfort  ("I can't get comfortable  ")   Effect of Pain on Daily Activities Requires frequent rest breaks 2/2 pain and fatigue   Hospital Pain Intervention(s) Medication (See MAR); Repositioned;Rest;Emotional support  (Medication per RN;)   Transfer Bed/Chair/Wheelchair   Comment Limited by extreme fatigue   Reason if not Attempted Safety concerns   Chair/Bed-to-Chair Transfer CARE Score 88   Roll Left and Right   Type of Assistance Needed Physical assistance   Physical Assistance Level 51%-75%   Comment mod/maxA without bedrails; minAx1 with bedrails; HOB flat   Roll Left and Right CARE Score 2   Sit to Lying   Type of Assistance Needed Physical assistance   Physical Assistance Level Total assistance   Comment modAx2 for assist of LEs and trunk; HOB flat   Sit to Lying CARE Score 1   Lying to Sitting on Side of Bed   Type of Assistance Needed Physical assistance   Physical Assistance Level Total assistance   Comment modAx2 for assist of LEs and trunk; HOB flat   Lying to Sitting on Side of Bed CARE Score 1   Sit to Stand   Comment Limited by extreme fatigue   Reason if not Attempted Safety concerns   Sit to Stand CARE Score 88   Picking Up Object   Reason if not Attempted Safety concerns   Picking Up Object CARE Score 88   Car Transfer   Reason if not Attempted Safety concerns   Car Transfer CARE Score 88   Ambulation   Findings Pt unable to perform any upright mobility this session due to extreme fatigue   Walk 10 Feet   Comment Limited by extreme fatigue Reason if not Attempted Safety concerns   Walk 10 Feet CARE Score 88   Walk 50 Feet with Two Turns   Comment Limited by extreme fatigue   Reason if not Attempted Safety concerns   Walk 50 Feet with Two Turns CARE Score 88   Walk 150 Feet   Comment Limited by extreme fatigue   Reason if not Attempted Safety concerns   Walk 150 Feet CARE Score 88   Walking 10 Feet on Uneven Surfaces   Reason if not Attempted Safety concerns   Walking 10 Feet on Uneven Surfaces CARE Score 88   Wheel 50 Feet with Two Turns   Reason if not Attempted Activity not applicable   Wheel 50 Feet with Two Turns CARE Score 9   Wheel 150 Feet   Reason if not Attempted Activity not applicable   Wheel 528 Feet CARE Score 9   Curb or Single Stair   Comment Limited by extreme fatigue   Reason if not Attempted Safety concerns   1 Step (Curb) CARE Score 88   4 Steps   Comment Limited by extreme fatigue   Reason if not Attempted Safety concerns   4 Steps CARE Score 88   12 Steps   Comment Limited by extreme fatigue   Reason if not Attempted Safety concerns   12 Steps CARE Score 88   Stairs   Findings Pt unable to perform any upright mobility this session due to extreme fatigue   Comprehension   QI: Comprehension 4  Undestands: Clear comprehension without cues or repetitions   Expression   QI: Expression 4   Express complex messages without difficulty and with speech that is clear and easy to Alex   RLE Assessment   RLE Assessment X  (Grossly 4-/5 MMT)   LLE Assessment   LLE Assessment X  (Grossly 4-/5 MMT)   Coordination   Movements are Fluid and Coordinated 1   Coordination and Movement Description (-) Heel to shin; fluid movement   Sensation   Additional Comments Peripheral neuropathy bilaterally in distal LE, mostly affecting bottom of feet; sensitive to light touch on plantar surface of feet; reports "pins and needles" in both feet   Cognition   Overall Cognitive Status [facility]   Therapeutic Exercise   Therapeutic Exercise/Activity Seated marches x15 total; seated LAQ x15 total   Discharge Information   Vocational Plan Return to work   Barriers to Return to [employer]   Patient's Discharge Plan Home with OPPT   Patient's Rehab Expectations "Get back to the physical shape I was in before "   Barriers to Discharge Home Decreased Strength;Decreased Endurance;Pain   Impressions Pt is a 61 y o  male seen for OT evaluation following admission to [facility] for critical illness myopathy  S/p 11/7/2022 Exploratory Laparotomy, 11/16/2022 Right hemicolectomy,11/17/2022 Re-exploration, partial descending colectomy, primary colocolonic anastomosis created with diverting loop ileostomy, midline wound VAC placement,12/8/2022 IR Percutaneous Cholecystostomy Tube Placement, Hepatectomy Abscess Drain Placement  PT evaluation and assessment of strength and functional mobility partially completed; pt unwilling to perform any tx or upright mobility today 2/2 to extreme fatigue  Prior to admission, pt Ind with all functional mobility and ADLs  Personal factors affecting status include multiple comorbidities, multiple lines and abdominal pain at incision site  Vitals upon eval: 150/82 L arm & 97 bpm supine at rest vs 164/78 L arm & 105 bpm sitting at EOB, returning to baseline within 3 min  Pt emotional and tearful during session regarding his current situation, duration of hospital stay to this point, and concerns for supporting his family since being out of work since Feb 2022  Current deficits include fatigue requiring frequent rest breaks and decreased activity tolerance, decreased endurance, decreased LE strength, decreased distal sensation / peripheral neuropathy, poor sitting / standing tolerance, high BP and fall risk  PT eval kept to bed-level mobility, requiring mod/maxAx1 for rolling and total A vs modAx2 for sit <> supine   Pt's reported goal for PT is to return to PLOF and "get back to the physical shape I was in before " Pt is a good candidate for skilled PT focusing on endurance, strength, ROM, balance, and functional mobility to decrease level of A and decrease caregiver burden  ELOS 4 weeks     PT Therapy Minutes   PT Time In 1400   PT Time Out 1510   PT Total Time (minutes) 70   PT Mode of treatment - Individual (minutes) 70   PT Mode of treatment - Concurrent (minutes) 0   PT Mode of treatment - Group (minutes) 0   PT Mode of treatment - Co-treat (minutes) 0   PT Mode of Treatment - Total time(minutes) 70 minutes   PT Cumulative Minutes 70   Cumulative Minutes   Cumulative therapy minutes 115 [facility]

## 2022-12-15 NOTE — PLAN OF CARE
Problem: Prexisting or High Potential for Compromised Skin Integrity  Goal: Skin integrity is maintained or improved  Description: INTERVENTIONS:  - Identify patients at risk for skin breakdown  - Assess and monitor skin integrity  - Assess and monitor nutrition and hydration status  - Monitor labs   - Assess for incontinence   - Turn and reposition patient  - Assist with mobility/ambulation  - Relieve pressure over bony prominences  - Avoid friction and shearing  - Provide appropriate hygiene as needed including keeping skin clean and dry  - Evaluate need for skin moisturizer/barrier cream  - Collaborate with interdisciplinary team   - Patient/family teaching  - Consider wound care consult   Outcome: Progressing     Problem: Potential for Falls  Goal: Patient will remain free of falls  Description: INTERVENTIONS:  - Educate patient/family on patient safety including physical limitations  - Instruct patient to call for assistance with activity   - Consult OT/PT to assist with strengthening/mobility   - Keep Call bell within reach  - Keep bed low and locked with side rails adjusted as appropriate  - Keep care items and personal belongings within reach  - Initiate and maintain comfort rounds  - Make Fall Risk Sign visible to staff  - Apply yellow socks and bracelet for high fall risk patients  - Consider moving patient to room near nurses station  Outcome: Progressing     Problem: PAIN - ADULT  Goal: Verbalizes/displays adequate comfort level or baseline comfort level  Description: Interventions:  - Encourage patient to monitor pain and request assistance  - Assess pain using appropriate pain scale  - Administer analgesics based on type and severity of pain and evaluate response  - Implement non-pharmacological measures as appropriate and evaluate response  - Consider cultural and social influences on pain and pain management  - Notify physician/advanced practitioner if interventions unsuccessful or patient reports new pain  Outcome: Progressing     Problem: INFECTION - ADULT  Goal: Absence or prevention of progression during hospitalization  Description: INTERVENTIONS:  - Assess and monitor for signs and symptoms of infection  - Monitor lab/diagnostic results  - Monitor all insertion sites, i e  indwelling lines, tubes, and drains  - Monitor endotracheal if appropriate and nasal secretions for changes in amount and color  - [place] appropriate cooling/warming therapies per order  - Administer medications as ordered  - Instruct and encourage patient and family to use good hand hygiene technique  - Identify and instruct in appropriate isolation precautions for identified infection/condition  Outcome: Progressing     Problem: SAFETY ADULT  Goal: Patient will remain free of falls  Description: INTERVENTIONS:  - Educate patient/family on patient safety including physical limitations  - Instruct patient to call for assistance with activity   - Consult OT/PT to assist with strengthening/mobility   - Keep Call bell within reach  - Keep bed low and locked with side rails adjusted as appropriate  - Keep care items and personal belongings within reach  - Initiate and maintain comfort rounds  - Make Fall Risk Sign visible to staff  - Apply yellow socks and bracelet for high fall risk patients  - Consider moving patient to room near nurses station  Outcome: Progressing  Goal: Maintain or return to baseline ADL function  Description: INTERVENTIONS:  -  Assess patient's ability to carry out ADLs; assess patient's baseline for ADL function and identify physical deficits which impact ability to perform ADLs (bathing, care of mouth/teeth, toileting, grooming, dressing, etc )  - Assess/evaluate cause of self-care deficits   - Assess range of motion  - Assess patient's mobility; develop plan if impaired  - Assess patient's need for assistive devices and provide as appropriate  - Encourage maximum independence but intervene and supervise when necessary  - Involve family in performance of ADLs  - Assess for home care needs following discharge   - Consider OT consult to assist with ADL evaluation and planning for discharge  - Provide patient education as appropriate  Outcome: Progressing  Goal: Maintains/Returns to pre admission functional level  Description: INTERVENTIONS:  - Perform BMAT or MOVE assessment daily    - Set and communicate daily mobility goal to care team and patient/family/caregiver     - Collaborate with rehabilitation services on mobility goals if consulted  - Out of bed for toileting  - Record patient progress and toleration of activity level   Outcome: Progressing     Problem: DISCHARGE PLANNING  Goal: Discharge to home or other facility with appropriate resources  Description: INTERVENTIONS:  - Identify barriers to discharge w/patient and caregiver  - Arrange for needed discharge resources and transportation as appropriate  - Identify discharge learning needs (meds, wound care, etc )  - Arrange for interpretive services to assist at discharge as needed  - Refer to Case Management Department for coordinating discharge planning if the patient needs post-hospital services based on physician/advanced practitioner order or complex needs related to functional status, cognitive ability, or social support system  Outcome: Progressing

## 2022-12-15 NOTE — PROGRESS NOTES
12/15/22 1400   Rehab Team Goals   Transfer Team Goal Patient will be independent with transfers with least restrictive device upon completion of rehab program   Locomotion Team Goal Patient will be independent with locomotion with least restrictive device upon completion of rehab program   Rehab Team Interventions   PT Interventions Gait Training; Therapeutic Exercise;Neuromuscualr Reeducation;Transfer Training;Bed Mobility   PT Transfer Goal   Roll left and right Goal 06  Independent - Patient completes the activity by him/herself with no assistance from a helper  Sit to lying Goal 06  Independent - Patient completes the activity by him/herself with no assistance from a helper  Lying to sitting on side of bed Goal 06  Independent - Patient completes the activity by him/herself with no assistance from a helper  Sit to stand Goal 06  Independent - Patient completes the activity by him/herself with no assistance from a helper  Chair/bed-to-chair transfer Goal 06  Independent - Patient completes the activity by him/herself with no assistance from a helper  Car Transfer Goal 04  Supervision or touching assistance- [place] provides VERBAL CUES or supervision throughout activity  Assistive Device   (LRAD)   Status Ongoing; Target goal - four weeks   Locomotion Goal   Primary discharge mode of locomotion Walking   Target Walk Distance 50 ft  (Can do increased distances for endurance training)   Walk 10 feet Goal 06  Independent - Patient completes the activity by him/herself with no assistance from a helper  Walk 50 feet with 2 turns Goal 06  Independent - Patient completes the activity by him/herself with no assistance from a helper  Walk 150 feet Goal 04  TOUCHING/ STEADYING assistance as patient completes activity  Walking 10 feet on uneven surface 04  TOUCHING/ STEADYING assistance as patient completes activity  Walking Goal Status Ongoing; Target goal - four weeks   Wheel 50 feet with 2 turns Goal 09  Not applicable   Wheel 959 feet Goal 09  Not applicable   Stairs Goal   1 step or curb goal 04  TOUCHING/ STEADYING assistance as patient completes activity  4 steps Goal 04  TOUCHING/ STEADYING assistance as patient completes activity  12 steps Goal 04  TOUCHING/ STEADYING assistance as patient completes activity  Status Ongoing; Target goal - four weeks   Object Retrieval Goal   Picking up object Goal 04  TOUCHING/ STEADYING assistance as patient completes activity     Assistive Device  Other (Comment)  (LRAD)     Brad Balderas,SPT

## 2022-12-15 NOTE — DISCHARGE INSTR - OTHER ORDERS
Skin Care Plan:  1-Midline Incision and Right Abdomen Wounds: Cleanse wounds with NS and pat dry  Apply Santyl to slough tissue on wound bed nickel thick  Cover with ABD and secure with minimal tape  Change daily or PRN soilage or displacement  2-Turn/reposition q2h or when medically stable for pressure re-distribution on skin   3-Elevate heels to offload pressure  4-Moisturize skin daily with skin nourishing cream  5-Ehob cushion in chair when out of bed  6-Preventative Hydraguard to bilateral heels and bilateral sacro-buttocks BID and PRN  Ostomy Care:  1  Peel back pouch using push-pull method, may use non-alcohol adhesive remover(Purple and white package)  2  Use warm water only to cleanse skin around the stoma (james-stomal skin)  3  Make sure all adhesive residue is removed and skin is dry and not oily  4  Measure stoma size using measuring guide and trace correct measurements onto back of pouch  5  Then mold the backing of pouch out to correct shape/size  6  Use 3M no sting barrier film to prep the skin around the stoma  7  Place pouch over stoma and onto skin  8  Use warmth of hand to apply gentle pressure to help backing of pouch to adhere well to skin  9  Empty pouch when 1/3 full  10  Change pouch every 4-5 days or if signs of leaking  11  Will continue to follow patient while an inpatient for Ostomy teaching/education  Please call [facility] @ [phone] with any concerns, questions, or help needed  Ostomy Education Resources:   [email or web address]   Www [employer]   Www [employer]   [email or web address] [email or web address]

## 2022-12-16 ENCOUNTER — HOSPITAL ENCOUNTER (OUTPATIENT)
Dept: INFUSION CENTER | Facility: CLINIC | Age: 57
End: 2022-12-16

## 2022-12-16 LAB
ABO GROUP BLD BPU: NORMAL
ALBUMIN SERPL BCP-MCNC: 2 G/DL (ref 3.5–5)
ALP SERPL-CCNC: 1349 U/L (ref 46–116)
ALT SERPL W P-5'-P-CCNC: 19 U/L (ref 12–78)
AST SERPL W P-5'-P-CCNC: 90 U/L (ref 5–45)
BASOPHILS # BLD AUTO: 0.03 THOUSANDS/ÂΜL (ref 0–0.1)
BASOPHILS NFR BLD AUTO: 0 % (ref 0–1)
BILIRUB DIRECT SERPL-MCNC: 1.03 MG/DL (ref 0–0.2)
BILIRUB SERPL-MCNC: 1.57 MG/DL (ref 0.2–1)
BPU ID: NORMAL
CROSSMATCH: NORMAL
EOSINOPHIL # BLD AUTO: 0 THOUSAND/ÂΜL (ref 0–0.61)
EOSINOPHIL NFR BLD AUTO: 0 % (ref 0–6)
ERYTHROCYTE [DISTWIDTH] IN BLOOD BY AUTOMATED COUNT: 17.3 % (ref 11.6–15.1)
GLUCOSE SERPL-MCNC: 154 MG/DL (ref 65–140)
GLUCOSE SERPL-MCNC: 156 MG/DL (ref 65–140)
GLUCOSE SERPL-MCNC: 168 MG/DL (ref 65–140)
GLUCOSE SERPL-MCNC: 187 MG/DL (ref 65–140)
HCT VFR BLD AUTO: 24.8 % (ref 36.5–49.3)
HGB BLD-MCNC: 8 G/DL (ref 12–17)
IMM GRANULOCYTES # BLD AUTO: 0.25 THOUSAND/UL (ref 0–0.2)
IMM GRANULOCYTES NFR BLD AUTO: 2 % (ref 0–2)
LYMPHOCYTES # BLD AUTO: 1.81 THOUSANDS/ÂΜL (ref 0.6–4.47)
LYMPHOCYTES NFR BLD AUTO: 17 % (ref 14–44)
MCH RBC QN AUTO: 26.9 PG (ref 26.8–34.3)
MCHC RBC AUTO-ENTMCNC: 32.3 G/DL (ref 31.4–37.4)
MCV RBC AUTO: 84 FL (ref 82–98)
MONOCYTES # BLD AUTO: 1.27 THOUSAND/ÂΜL (ref 0.17–1.22)
MONOCYTES NFR BLD AUTO: 12 % (ref 4–12)
NEUTROPHILS # BLD AUTO: 7.16 THOUSANDS/ÂΜL (ref 1.85–7.62)
NEUTS SEG NFR BLD AUTO: 69 % (ref 43–75)
NRBC BLD AUTO-RTO: 0 /100 WBCS
PLATELET # BLD AUTO: 300 THOUSANDS/UL (ref 149–390)
PMV BLD AUTO: 9.2 FL (ref 8.9–12.7)
PROT SERPL-MCNC: 8.3 G/DL (ref 6.4–8.4)
RBC # BLD AUTO: 2.97 MILLION/UL (ref 3.88–5.62)
UNIT DISPENSE STATUS: NORMAL
UNIT PRODUCT CODE: NORMAL
UNIT PRODUCT VOLUME: 350 ML
UNIT RH: NORMAL
WBC # BLD AUTO: 10.52 THOUSAND/UL (ref 4.31–10.16)

## 2022-12-16 PROCEDURE — 30233N1 TRANSFUSION OF NONAUTOLOGOUS RED BLOOD CELLS INTO PERIPHERAL VEIN, PERCUTANEOUS APPROACH: ICD-10-PCS | Performed by: PHYSICAL MEDICINE & REHABILITATION

## 2022-12-16 RX ORDER — LANOLIN ALCOHOL/MO/W.PET/CERES
3 CREAM (GRAM) TOPICAL
Status: DISCONTINUED | OUTPATIENT
Start: 2022-12-16 | End: 2023-01-17 | Stop reason: HOSPADM

## 2022-12-16 RX ORDER — INSULIN LISPRO 100 [IU]/ML
4 INJECTION, SOLUTION INTRAVENOUS; SUBCUTANEOUS
Status: DISCONTINUED | OUTPATIENT
Start: 2022-12-16 | End: 2022-12-19

## 2022-12-16 RX ORDER — SIMETHICONE 80 MG
80 TABLET,CHEWABLE ORAL
Status: DISCONTINUED | OUTPATIENT
Start: 2022-12-16 | End: 2023-01-17 | Stop reason: HOSPADM

## 2022-12-16 RX ADMIN — INSULIN LISPRO 4 UNITS: 100 INJECTION, SOLUTION INTRAVENOUS; SUBCUTANEOUS at 12:33

## 2022-12-16 RX ADMIN — SIMETHICONE 80 MG: 80 TABLET, CHEWABLE ORAL at 16:31

## 2022-12-16 RX ADMIN — MELATONIN TAB 3 MG 3 MG: 3 TAB at 19:57

## 2022-12-16 RX ADMIN — TAMSULOSIN HYDROCHLORIDE 0.4 MG: 0.4 CAPSULE ORAL at 16:32

## 2022-12-16 RX ADMIN — OXYCODONE HYDROCHLORIDE 10 MG: 10 TABLET ORAL at 09:49

## 2022-12-16 RX ADMIN — INSULIN LISPRO 1 UNITS: 100 INJECTION, SOLUTION INTRAVENOUS; SUBCUTANEOUS at 16:33

## 2022-12-16 RX ADMIN — AMLODIPINE BESYLATE 5 MG: 5 TABLET ORAL at 08:32

## 2022-12-16 RX ADMIN — ACETAMINOPHEN 975 MG: 325 TABLET, FILM COATED ORAL at 21:06

## 2022-12-16 RX ADMIN — INSULIN LISPRO 3 UNITS: 100 INJECTION, SOLUTION INTRAVENOUS; SUBCUTANEOUS at 09:18

## 2022-12-16 RX ADMIN — INSULIN GLARGINE 12 UNITS: 100 INJECTION, SOLUTION SUBCUTANEOUS at 21:07

## 2022-12-16 RX ADMIN — HEPARIN SODIUM 5000 UNITS: 5000 INJECTION INTRAVENOUS; SUBCUTANEOUS at 04:16

## 2022-12-16 RX ADMIN — PANTOPRAZOLE SODIUM 40 MG: 40 TABLET, DELAYED RELEASE ORAL at 16:32

## 2022-12-16 RX ADMIN — METHOCARBAMOL 500 MG: 500 TABLET ORAL at 08:32

## 2022-12-16 RX ADMIN — METHOCARBAMOL 500 MG: 500 TABLET ORAL at 17:23

## 2022-12-16 RX ADMIN — CALCIUM CARBONATE (ANTACID) CHEW TAB 500 MG 500 MG: 500 CHEW TAB at 08:36

## 2022-12-16 RX ADMIN — ONDANSETRON 4 MG: 4 TABLET, ORALLY DISINTEGRATING ORAL at 09:28

## 2022-12-16 RX ADMIN — ACETAMINOPHEN 975 MG: 325 TABLET, FILM COATED ORAL at 13:36

## 2022-12-16 RX ADMIN — ACETAMINOPHEN 975 MG: 325 TABLET, FILM COATED ORAL at 04:17

## 2022-12-16 RX ADMIN — CHOLECALCIFEROL TAB 10 MCG (400 UNIT) 400 UNITS: 10 TAB at 08:33

## 2022-12-16 RX ADMIN — OXYCODONE HYDROCHLORIDE 10 MG: 10 TABLET ORAL at 03:58

## 2022-12-16 RX ADMIN — AMLODIPINE BESYLATE 5 MG: 5 TABLET ORAL at 17:23

## 2022-12-16 RX ADMIN — HEPARIN SODIUM 5000 UNITS: 5000 INJECTION INTRAVENOUS; SUBCUTANEOUS at 21:07

## 2022-12-16 RX ADMIN — SIMETHICONE 80 MG: 80 TABLET, CHEWABLE ORAL at 21:07

## 2022-12-16 RX ADMIN — INSULIN LISPRO 1 UNITS: 100 INJECTION, SOLUTION INTRAVENOUS; SUBCUTANEOUS at 12:32

## 2022-12-16 RX ADMIN — OXYCODONE HYDROCHLORIDE 10 MG: 10 TABLET ORAL at 16:31

## 2022-12-16 RX ADMIN — ASPIRIN 81 MG CHEWABLE TABLET 81 MG: 81 TABLET CHEWABLE at 08:32

## 2022-12-16 RX ADMIN — PANTOPRAZOLE SODIUM 40 MG: 40 TABLET, DELAYED RELEASE ORAL at 05:50

## 2022-12-16 RX ADMIN — HEPARIN SODIUM 5000 UNITS: 5000 INJECTION INTRAVENOUS; SUBCUTANEOUS at 13:38

## 2022-12-16 RX ADMIN — SIMETHICONE 80 MG: 80 TABLET, CHEWABLE ORAL at 12:33

## 2022-12-16 RX ADMIN — ATORVASTATIN CALCIUM 40 MG: 40 TABLET, FILM COATED ORAL at 08:32

## 2022-12-16 RX ADMIN — Medication 1 TABLET: at 08:32

## 2022-12-16 NOTE — PLAN OF CARE
Problem: Prexisting or High Potential for Compromised Skin Integrity  Goal: Skin integrity is maintained or improved  Description: INTERVENTIONS:  - Identify patients at risk for skin breakdown  - Assess and monitor skin integrity  - Assess and monitor nutrition and hydration status  - Monitor labs   - Assess for incontinence   - Turn and reposition patient  - Assist with mobility/ambulation  - Relieve pressure over bony prominences  - Avoid friction and shearing  - Provide appropriate hygiene as needed including keeping skin clean and dry  - Evaluate need for skin moisturizer/barrier cream  - Collaborate with interdisciplinary team   - Patient/family teaching  - Consider wound care consult   Outcome: Progressing     Problem: Potential for Falls  Goal: Patient will remain free of falls  Description: INTERVENTIONS:  - Educate patient/family on patient safety including physical limitations  - Instruct patient to call for assistance with activity   - Consult OT/PT to assist with strengthening/mobility   - Keep Call bell within reach  - Keep bed low and locked with side rails adjusted as appropriate  - Keep care items and personal belongings within reach  - Initiate and maintain comfort rounds  - Make Fall Risk Sign visible to staff  - Offer Toileting every  Hours, in advance of need  - Initiate/Maintain alarm  - Obtain necessary fall risk management equipment:   - Apply yellow socks and bracelet for high fall risk patients  - Consider moving patient to room near nurses station  Outcome: Progressing     Problem: PAIN - ADULT  Goal: Verbalizes/displays adequate comfort level or baseline comfort level  Description: Interventions:  - Encourage patient to monitor pain and request assistance  - Assess pain using appropriate pain scale  - Administer analgesics based on type and severity of pain and evaluate response  - Implement non-pharmacological measures as appropriate and evaluate response  - Consider cultural and social influences on pain and pain management  - Notify physician/advanced practitioner if interventions unsuccessful or patient reports new pain  Outcome: Progressing     Problem: INFECTION - ADULT  Goal: Absence or prevention of progression during hospitalization  Description: INTERVENTIONS:  - Assess and monitor for signs and symptoms of infection  - Monitor lab/diagnostic results  - Monitor all insertion sites, i e  indwelling lines, tubes, and drains  - Monitor endotracheal if appropriate and nasal secretions for changes in amount and color  - [place] appropriate cooling/warming therapies per order  - Administer medications as ordered  - Instruct and encourage patient and family to use good hand hygiene technique  - Identify and instruct in appropriate isolation precautions for identified infection/condition  Outcome: Progressing     Problem: SAFETY ADULT  Goal: Patient will remain free of falls  Description: INTERVENTIONS:  - Educate patient/family on patient safety including physical limitations  - Instruct patient to call for assistance with activity   - Consult OT/PT to assist with strengthening/mobility   - Keep Call bell within reach  - Keep bed low and locked with side rails adjusted as appropriate  - Keep care items and personal belongings within reach  - Initiate and maintain comfort rounds  - Make Fall Risk Sign visible to staff  - Offer Toileting every  Hours, in advance of need  - Initiate/Maintain alarm  - Obtain necessary fall risk management equipment:   - Apply yellow socks and bracelet for high fall risk patients  - Consider moving patient to room near nurses station  Outcome: Progressing  Goal: Maintain or return to baseline ADL function  Description: INTERVENTIONS:  -  Assess patient's ability to carry out ADLs; assess patient's baseline for ADL function and identify physical deficits which impact ability to perform ADLs (bathing, care of mouth/teeth, toileting, grooming, dressing, etc )  - Assess/evaluate cause of self-care deficits   - Assess range of motion  - Assess patient's mobility; develop plan if impaired  - Assess patient's need for assistive devices and provide as appropriate  - Encourage maximum independence but intervene and supervise when necessary  - Involve family in performance of ADLs  - Assess for home care needs following discharge   - Consider OT consult to assist with ADL evaluation and planning for discharge  - Provide patient education as appropriate  Outcome: Progressing  Goal: Maintains/Returns to pre admission functional level  Description: INTERVENTIONS:  - Perform BMAT or MOVE assessment daily    - Set and communicate daily mobility goal to care team and patient/family/caregiver  - Collaborate with rehabilitation services on mobility goals if consulted  - Perform Range of Motion  times a day  - Reposition patient every  hours    - Dangle patient  times a day  - Stand patie times a day  - Ambulate patient  times a day  - Out of bed to chair  times a day   - Out of bed for meals  times a day  - Out of bed for toileting  - Record patient progress and toleration of activity level   Outcome: Progressing     Problem: DISCHARGE PLANNING  Goal: Discharge to home or other facility with appropriate resources  Description: INTERVENTIONS:  - Identify barriers to discharge w/patient and caregiver  - Arrange for needed discharge resources and transportation as appropriate  - Identify discharge learning needs (meds, wound care, etc )  - Arrange for interpretive services to assist at discharge as needed  - Refer to Case Management Department for coordinating discharge planning if the patient needs post-hospital services based on physician/advanced practitioner order or complex needs related to functional status, cognitive ability, or social support system  Outcome: Progressing

## 2022-12-16 NOTE — PROGRESS NOTES
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  • s/p hepatic resection and reversal of colostomy on 11/7  • Return to the OR 11/16 for right hemicolectomy with partial descending colectomy colocolonic anastomosis with loop ileostomy and mid line abdominal VAC placement  • Possible fistula formation  • Continue local wound care; WC following  • 12/15:  Ileostomy = 500ml, radha tube = 40ml, VICTORINO drain = 55ml; U/O = 650     Acute cholecystitis  • s/p percutaneous tube placement 12/8 with tube check on 12/12  • Currently draining bloody discharge  • IR evaluated 12/14 states perc radha tube drainage may remain bloody for several days  • No further abdominal pain  • LFTs  for today 12/16 showed increasing Alk phos to 1,349 = ask GI to see     Bacteremia/abdominal abscess  • Consult ID to follow  • Currently on Ertapenem for ESBL E  Coli with LD 12/15  • VICTORINO drain currently with thick milky drainage  • Stable; no fever     Hypertension  • On Norvasc 5mg BID  • stable     Diabetes mellitus  • Continue Lantus 12U qhs/Lispro 3U TID  • Continue diabetic diet and QID Accuchecks with SSI  • Increase Lispro to 4U TID     Acute on chronic renal failure  • Stage III; baseline 1 4  • Lisinopril currently on hold  • Stable today at 1 38     Anemia  • Multifactorial due to recent infection, surgery, CKD stage III  • Transfused on 12/15 for hemoglobin 7 3   • Getting a unit today 12/16 for hemoglobin 8 0     Atypical chest pain  • With elevation in troponin/EKG changes  • Cardiology cleared pt for discharge  • Did not recommend any further work up  • On 12/15 had CP = Cards saw and felt was M/S     Situational depression  • Neuropsychology consulted  • Patient not interested in medications at this point in time     Malnutrition  • Recommend dietician consultation to optimize wound healing  • Glucerna makes him nauseated  • Breads etc make him too full  • On 12/15, ordered double protein     Pleural effusion  · CXR on 12/14 showed small left pleural effusion and was suspect for CHF  · ECHO 11/9/22 = LVEF 55%, unable to assess diastolic function, mild TR  · No SOB and sats are stable on RA  · Will watch for now       Discharge date:  Team    The above assessment and plan was reviewed and updated as determined by my evaluation of the patient on 12/16/2022      Labs:   Results from last 7 days   Lab Units 12/16/22  0443 12/15/22  0516   WBC Thousand/uL 10 52* 11 23*   HEMOGLOBIN g/dL 8 0* 7 3*   HEMATOCRIT % 24 8* 23 5*   PLATELETS Thousands/uL 300 321     Results from last 7 days   Lab Units 12/15/22  0516 12/14/22  0522   SODIUM mmol/L 134* 136   POTASSIUM mmol/L 3 9 3 7   CHLORIDE mmol/L 102 104   CO2 mmol/L 26 27   BUN mg/dL 15 16   CREATININE mg/dL 1 38* 1 43*   CALCIUM mg/dL 8 4 8 8             Results from last 7 days   Lab Units 12/16/22  0628 12/15/22  2104 12/15/22  1553   POC GLUCOSE mg/dl 168* 192* 199*       Review of Scheduled Meds:  Current Facility-Administered Medications   Medication Dose Route Frequency Provider Last Rate   • acetaminophen  975 mg Oral Q8H [facility] GAURANG Keith     • amLODIPine  5 mg Oral BID GAURANG Keith     • aspirin  81 mg Oral Daily GAURANG Keith     • atorvastatin  40 mg Oral Daily GAURANG Keith     • calcium carbonate  500 mg Oral BID PRN GAURANG Peterson     • cholecalciferol  400 Units Oral Daily GAURANG Keith     • heparin (porcine)  5,000 Units Subcutaneous Q8H [facility] GAURANG Keith     • insulin glargine  12 Units Subcutaneous HS GAURANG Keith     • insulin lispro  1-5 Units Subcutaneous HS GAURANG Keith     • insulin lispro  1-6 Units Subcutaneous TID AC GAURANG Keith     • insulin lispro  3 Units Subcutaneous TID With Meals GAURANG Costa     • melatonin  3 mg Oral HS GAURANG Keith     • methocarbamol  500 mg Oral BID GAURANG Costa     • multivitamin-minerals  1 tablet Oral Daily GAURANG Keith     • ondansetron  4 mg Oral Q6H PRN GAURANG Costa     • oxyCODONE  10 mg Oral Q6H PRN GAURANG Keith     • oxyCODONE  5 mg Oral Q6H PRN GAURANG Keith     • pantoprazole  40 mg Oral BID AC GAURANG Keith     • tamsulosin  0 4 mg Oral Daily With Dinner GAURANG Keith         Subjective/ HPI: Patient seen and examined  Patients overnight issues or events were reviewed with nursing or staff during rounds or morning huddle session  New or overnight issues include the following:     No new or overnight issues  Offers no complaints except abd bloating and fatigue    ROS:   A 10 point ROS was performed; negative except as noted above  Imaging:     No orders to display       *Labs /Radiology studies reviewed  *Medications reviewed and reconciled as needed  *Please refer to order section for additional ordered labs studies  *Case discussed with primary attending during morning huddle case rounds    Physical Examination:  Vitals:   Vitals:    12/15/22 1703 12/15/22 1717 12/15/22 1945 12/15/22 2005   BP: 149/82 148/84 149/75 145/79   BP Location:    Left arm   Pulse: 90 94 87 88   Resp: 18 18 18 18   Temp: 98 3 °F (36 8 °C) 97 9 °F (36 6 °C) 97 7 °F (36 5 °C) 98 3 °F (36 8 °C)   TempSrc:    Oral   SpO2:    94%   Weight:       Height:           General Appearance: no distress, conversive  HEENT:  External ear normal   Nose normal w/o drainage  Mucous membranes are moist  Oropharynx is clear  Conjunctiva clear w/o icterus or redness  Neck:  Supple, normal ROM  Lungs: BBS without crackles/wheeze/rhonchi; respirations unlabored with normal inspiratory/expiratory effort  No retractions noted  On RA  CV: regular rate and rhythm; no rubs/murmurs/gallops, PMI normal   ABD: Abdomen is soft    Bowel sounds all quadrants  Nontender with no distention  VICTORINO draining milky tan fluid; perc radha tube draining bloody fluid    EXT: no edema  Skin: normal turgor, normal texture, no rashes  Psych: affect normal, mood normal  Neuro: AAO      The above physical exam was reviewed and updated as determined by my evaluation of the patient on 12/16/2022  Invasive Devices     Central Venous Catheter Line  Duration           Port A Cath 03/10/22 Right Chest 281 days          Drain  Duration           Ileostomy LUQ 28 days    Abscess Drain Abdomen 7 days    Cholecystostomy Tube 3 days                   VTE Pharmacologic Prophylaxis: Heparin  Code Status: Level 1 - Full Code  Current Length of Stay: 2 day(s)      Total time spent:  30 minutes with more than 50% spent counseling/coordinating care  Counseling includes discussion with patient re: progress  and discussion with patient of his/her current medical state/information  Coordination of patient's care was performed in conjunction with primary service  Time invested included review of patient's labs, vitals, and management of their comorbidities with continued monitoring  In addition, this patient was discussed with medical team including physician and advanced extenders  The care of the patient was extensively discussed and appropriate treatment plan was formulated unique for this patient  Medical decision making for the day was made by supervising physician unless otherwise noted in their attestation statement  ** Please Note:  voice to text software may have been used in the creation of this document   Although proof errors in transcription or interpretation are a potential of such software**

## 2022-12-16 NOTE — PROGRESS NOTES
[facility] Occupational Therapy Daily Note  Patient Active Problem List   Diagnosis    Type 2 diabetes mellitus without complication, with long-term current use of insulin ([facility] )    Benign essential hypertension    Mixed hyperlipidemia    Erectile dysfunction    Low testosterone    Obesity (BMI 30-39  9)    Testicular hypogonadism    Incarcerated umbilical hernia    Left ureteral calculus    Type 2 diabetes mellitus with hyperlipidemia (HCC)    Colonic mass    Microcytic anemia    Hypokalemia    Transaminitis    Thrombocytosis    Iron deficiency anemia, unspecified    Malignant neoplasm of transverse colon (HCC)    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)    Colon cancer metastasized to liver (HCC)    Colostomy prolapse (HCC)    Other fatigue    Cervical radiculopathy    Encephalopathy    Flash pulmonary edema (HCC)    MR (mitral regurgitation)    Bacteremia    ESBL (extended spectrum beta-lactamase) producing bacteria infection    Acute respiratory failure with hypoxia (HCC)    Severe protein-calorie malnutrition ([facility] )    Acute kidney injury ([facility] )       Past Medical History:   Diagnosis Date    Abdominal pain 03/12/2022    Acute renal failure ([facility] )     [id number] resolved    Cancer ([facility] )     Diabetes mellitus ([facility] )     Enteritis 08/23/2016    Gastroparesis due to DM ([facility] ) 08/23/2016    GERD (gastroesophageal reflux disease)     Hernia, ventral 08/04/2016    Hyperlipidemia     Hypertension     Morbid obesity ([facility] ) 04/17/2018    Postoperative visit 03/02/2022    SIRS (systemic inflammatory response syndrome) ([facility] ) 03/12/2022    Snoring     Stage 3a chronic kidney disease ([facility] ) 02/19/2022     Etiologic Diagnosis: Critical illness myopathy  Restrictions/Precautions  Precautions: Fall Risk, Contact/isolation, Multiple lines, Supervision on toilet/commode ([place] drain, ileostomy, IR Drain)  ADL Team Goal: Patient will require assist with ADLs with least restrictive device upon completion of rehab program  Occupational Therapy LTG's  Eating Oral care Bathing LB dress UB dress   Eating Goal: 06  Independent - Patient completes the activity by him/herself with no assistance from a helper  Oral Hygiene Goal: 06  Independent - Patient completes the activity by him/herself with no assistance from a helper  Shower/bathe self Goal: 04  Supervision or touching assistance- [place] provides VERBAL CUES or supervision throughout activity  Lower body dressing Goal: 04  Supervision or touching assistance- [place] provides VERBAL CUES or supervision throughout activity  Upper body dressing Goal: 04  Supervision or touching assistance- [place] provides VERBAL CUES or supervision throughout activity  Toileting Toilet txf Func txf IADL Med    Toileting hygiene Goal: 04  Supervision or touching assistance- [place] provides VERBAL CUES or supervision throughout activity  Toilet transfer Goal: 04  Supervision or touching assistance- [place] provides VERBAL CUES or supervision throughout activity  Assist Level: Supervision   OT interventions: Treatment/Interventions: (P) ADL retraining, Functional transfer training, LE strengthening/ROM, Therapeutic exercise, Endurance training, Cognitive reorientation, Patient/family training, Equipment eval/education, Bed mobility, Compensatory technique education  Discharge Plan:  OT Discharge Recommendation: (P)  (Home with family support)   DME: Equipment Recommended: (P)  (TBD),  ,       12/16/22 5205   Pain Assessment   Pain Assessment Tool 0-10   Pain Score 8   Hospital Pain Intervention(s) Repositioned   Lifestyle   Autonomy "I was really wiped "   Oral Hygiene   Type of Assistance Needed Physical assistance   Physical Assistance Level 25% or less   Comment session goal to complete oral care sitting EOB  Pt able to tolerate completion of task sitting EOB with MINimal LOw back support ~5 min  Req reclined positional break for 3 min     Oral Hygiene CARE Score 3   Grooming   Findings seated EOB [facility]   Upper Body Dressing   Comment Gown changed as it was saurated in blood  Mónica Owen RN present and states blood was earlier heparin shot  Sit to Lying   Type of Assistance Needed Physical assistance   Physical Assistance Level 51%-75%   Sit to Lying CARE Score 2   Lying to Sitting on Side of Bed   Type of Assistance Needed Physical assistance   Physical Assistance Level 51%-75%   Lying to Sitting on Side of Bed CARE Score 2   Additional Activities   Additional Activities Comments extensive time spent with positioning Pt in bed  chair mode utilized for comfort  pillow placed under knees to support low back HOB around 45 degrees  b/l handrails placed so pt could readjust trunk  pt engages in simulated forward leaning to use back scratcher  Assessment   Treatment Assessment Skilled OT session Focusing on sitting tolerance, ADL engagement and bed positioning  Pt reporting some pain this morning with reports of not sleeping well 2* being too hot  RN setting up fan at this time  Pt is eager to participate with goal to sit edge of bed to brush teeth and call his wife on the phone  Pt continues to require skilled acute rehab OT services to increase overall functional independence and safety w/ I/ADLs and functional transfers  Continue with plan for discharge as return home with increased family support,   Continued plan of care for OT sessions to focus on the following areas:  ADL Retraining , LB Dressing,  LHAE education/training, Functional Transfers, Functional Cognition, Standing tolerance, Standing balance , Fine motor coordination, Gross motor coordination, Fine motor strengthening , Gross motor strengthening , DME training/education, Family training/education, Energy conservation training/education, healthy coping education, Leisure and social pursuits, sitting balance and Core/trunk control/strengthening  next sessions focus on continued OOB tolerance, basic transfers to [facility], light unsupported sitting, light UE strengthening  Prognosis Good   Problem List Decreased strength;Decreased range of motion;Decreased endurance; Impaired balance;Decreased mobility; Decreased coordination;Decreased cognition; Impaired sensation;Decreased skin integrity;Pain   Plan   Treatment/Interventions ADL retraining;Functional transfer training;LE strengthening/ROM; Therapeutic exercise; Endurance training;Cognitive reorientation;Patient/family training;Equipment eval/education; Bed mobility; Compensatory technique education   Progress Progressing toward goals   Recommendation   OT Discharge Recommendation   (Home with family support)   Equipment Recommended   (TBD)   OT Therapy Minutes   OT Time In 0945   OT Time Out 1030   OT Total Time (minutes) 45   OT Mode of treatment - Individual (minutes) 45   OT Mode of treatment - Concurrent (minutes) 0   OT Mode of treatment - Group (minutes) 0   OT Mode of treatment - Co-treat (minutes) 0   OT Mode of Treatment - Total time(minutes) 45 minutes   OT Cumulative Minutes 160

## 2022-12-16 NOTE — UTILIZATION REVIEW
NOTIFICATION OF ADMISSION DISCHARGE   This is a Notification of Discharge from [facility]  Please be advised that this patient has been discharge from our facility  Below you will find the admission and discharge date and time including the patient’s disposition  UTILIZATION REVIEW CONTACT:  Rose Mary Ferrara  Utilization   Network Utilization Review Department  Phone: [phone] x carefully listen to the prompts  All voicemails are confidential   Email: [email or web address]     ADMISSION INFORMATION  PRESENTATION DATE: 12/4/2022 12:10 AM  OBERVATION ADMISSION DATE:   INPATIENT ADMISSION DATE: 12/7/22  3:08 PM   DISCHARGE DATE: 12/14/2022  3:17 PM   [facility]    IMPORTANT INFORMATION:  Send all requests for admission clinical reviews, approved or denied determinations and any other requests to dedicated fax number below belonging to the campus where the patient is receiving treatment   List of dedicated fax numbers:  [address] DENIALS (Administrative/Medical Necessity) [phone]   [address] (Maternity/NICU/Pediatrics) [phone]   [facility] [phone]   [phone]   [address] [phone]   [address] [facility] [phone] [address] [facility] [phone]   [facility] [place] [address] [phone]   [facility]  [phone] [address] [facility] [phone]   [address] [facility] [address] [facility] [phone]

## 2022-12-16 NOTE — PROGRESS NOTES
12/16/22 1230   Pain Assessment   Pain Assessment Tool FLACC   Pain Rating: FLACC (Rest) - Face 0   Pain Rating: FLACC (Rest) - Legs 0   Pain Rating: FLACC (Rest) - Activity 0   Pain Rating: FLACC (Rest) - Cry 0   Pain Rating: FLACC (Rest) - Consolability 0   Score: FLACC (Rest) 0   Pain Rating: FLACC (Activity) - Face 1   Pain Rating: FLACC (Activity) - Legs 0   Pain Rating: FLACC (Activity) - Activity 0   Pain Rating: FLACC (Activity) - Cry 1   Pain Rating: FLACC (Activity) - Consolability 1   Score: FLACC (Activity) 3   Restrictions/Precautions   Precautions Fall Risk;Contact/isolation;Multiple lines;Supervision on toilet/commode;Pain  (VICTORINO drain, ileostomy, IR drain)   Weight Bearing Restrictions No   ROM Restrictions No   General   Change In Medical/Functional Status BP L arm, seated 146/68 after initial EOB > w/c tx   Cognition   Overall Cognitive Status WFL   Subjective   Subjective Pt ready to participate in PT session  Sit to Lying   Type of Assistance Needed Physical assistance   Physical Assistance Level Total assistance   Comment mod/max Ax2- assist BLE and guidance of trunk   Sit to Lying CARE Score 1   Lying to Sitting on Side of Bed   Type of Assistance Needed Physical assistance   Physical Assistance Level 51%-75%   Comment modAx1; HOB elevated, increased time   Lying to Sitting on Side of Bed CARE Score 2   Sit to Stand   Type of Assistance Needed Physical assistance   Physical Assistance Level 26%-50%   Comment min/mod Ax1 STS with VC for hand placement   Sit to Stand CARE Score 3   Bed-Chair Transfer   Type of Assistance Needed Physical assistance   Physical Assistance Level 51%-75%   Comment modAx1 for balance / safety; SPT with RW; increased time   Chair/Bed-to-Chair Transfer CARE Score 2   Transfer Bed/Chair/Wheelchair   Limitations Noted In Balance;Confidence; Endurance;UE Strength;LE Strength   Adaptive Equipment Roller Walker   Findings SPT with RW x2 w/c <> EOB; modA; increased time Walk 10 Feet   Type of Assistance Needed Physical assistance   Physical Assistance Level Total assistance   Comment modAx2 with CF   Walk 10 Feet CARE Score 1   Walk 50 Feet with Two Turns   Comment limited by fatigue   Reason if not Attempted Medical concerns   Walk 50 Feet with Two Turns CARE Score 88   Walk 150 Feet   Comment limited by fatigue   Reason if not Attempted Medical concerns   Walk 150 Feet CARE Score 88   Ambulation   Primary Mode of Locomotion Prior to Admission Walk   Distance Walked (feet) 11 ft   Assist Device Roller Walker   Gait Pattern Inconsistant Josefina; Slow Josefina;Decreased foot clearance; Forward Flexion; Shuffle;Step through; Improper weight shift   Limitations Noted In Balance; Endurance; Safety; Sensation;Speed;Strength;Swing   Provided Assistance with: Balance;Trunk Support;Direction   Walk Assist Level Chair Follow; Moderate Assist   Findings Pre-determined distance (10') set for pt to amb; modAx2 with CF   Does the patient walk? 2  Yes   Wheel 50 Feet with Two Turns   Reason if not Attempted Activity not applicable   Wheel 50 Feet with Two Turns CARE Score 9   Wheel 150 Feet   Reason if not Attempted Activity not applicable   Wheel 125 Feet CARE Score 9   Wheelchair mobility   Does the patient use a wheelchair? 1  Yes   Type of Wheelchair Used 1  Manual   Method Right upper extremity; Left upper extremity   Assistance Provided For Remove Leg Rest;Replace Leg Rest;Locking Brakes   Distance Level Surface (feet) 90 ft   Findings Pt propelled 90' with BUE, no turns, for endurance training   Therapeutic Interventions   Flexibility Seated HS/gastroc [id number] ea leg; TERT 90sec   Other Stepping in place x10 for endurance training and improving standing tolerance   Assessment   Treatment Assessment Pt engaged in 10 min individual + 50 minute PT/OT co-treat, PT focusing on transfer training, endurance, LE ROM, standing tolerance and gait training   Co-treat coordinated due to pt's decreased therapy tolerance / fatigue; PT/OT alternating between functional mobility tasks / LE ROM and ADLs /  strength with frequent rest breaks and monitoring vitals  Vitals as follows: HR 1426/68 L arm & 104-110 bpm after transfer training, 120 bpm after amb 10' with RW  Pt requires mod/maxAx1 for bed mobility & tx, modAx2 for amb with RW  Pt motivated to participate; benefits from short-term goal-setting for session objectives  Pt still limited by decreased cardiovascular endurance, decreased activity tolerance, fatigue, LE weakness, and impaired sensation affecting level of Ind with functional mobility  Problem List Decreased strength;Decreased range of motion;Decreased endurance; Impaired balance;Decreased mobility;Pain   Plan   Treatment/Interventions Functional transfer training;LE strengthening/ROM; Therapeutic exercise; Endurance training;Bed mobility;Gait training;Equipment eval/education   Progress Progressing toward goals   PT Therapy Minutes   PT Time In 1230   PT Time Out 1330   PT Total Time (minutes) 60   PT Mode of treatment - Individual (minutes) 10   PT Mode of treatment - Concurrent (minutes) 50   PT Mode of treatment - Group (minutes) 0   PT Mode of treatment - Co-treat (minutes) 0   PT Mode of Treatment - Total time(minutes) 60 minutes   PT Cumulative Minutes [address] [address]

## 2022-12-16 NOTE — PROGRESS NOTES
12/16/22 1100   Pain Assessment   Pain Assessment Tool FLACC   Pain Rating: FLACC (Rest) - Face 0   Pain Rating: FLACC (Rest) - Legs 0   Pain Rating: FLACC (Rest) - Activity 0   Pain Rating: FLACC (Rest) - Cry 0   Pain Rating: FLACC (Rest) - Consolability 0   Score: FLACC (Rest) 0   Pain Rating: FLACC (Activity) - Face 1   Pain Rating: FLACC (Activity) - Legs 0   Pain Rating: FLACC (Activity) - Activity 0   Pain Rating: FLACC (Activity) - Cry 1   Pain Rating: FLACC (Activity) - Consolability 0   Score: FLACC (Activity) 2   Restrictions/Precautions   Precautions Fall Risk;Contact/isolation;Supervision on toilet/commode;Pain  (VICTORINO drain, ileostomy, IR Drain;d ec skin integrtiy)   Weight Bearing Restrictions No   ROM Restrictions No   Cognition   Comments groggy from a deep nap but was able to be awoken; needed some reiorientation at the start of session   Subjective   Subjective Patient ready to participate in PT session   Sit to Lying   Type of Assistance Needed Physical assistance   Physical Assistance Level Total assistance   Comment mod/max Ax2- assist BLE and guidance of trunk   Sit to Lying CARE Score 1   Lying to Sitting on Side of Bed   Type of Assistance Needed Physical assistance   Physical Assistance Level 51%-75%   Comment HOB elevated, increased time   Lying to Sitting on Side of Bed CARE Score 2   Sit to Stand   Type of Assistance Needed Physical assistance   Physical Assistance Level 76% or more   Comment max Ax1 from wheelchair with verbal cues for proper hand placement; mod Ax1 from bed slightly elevated   Sit to Stand CARE Score 2   Bed-Chair Transfer   Type of Assistance Needed Physical assistance   Physical Assistance Level 51%-75%   Comment SPT with RW; increased time   Chair/Bed-to-Chair Transfer CARE Score 2   Transfer Bed/Chair/Wheelchair   Limitations Noted In Balance; Endurance;Confidence;UE Strength;LE Strength   Adaptive Equipment Roller Walker   Assessment   Treatment Assessment Patient engaged in brief PT treatment session focused on STS and SPT with use of RW  Patient able to transfer OOB however fatigued easily and needed return to bed for rest upon completion  Discussed goals to determine best sitting surface to increase OOB time especially for meals  Patient in agreement, plan for next week     PT Therapy Minutes   PT Time In 1100   PT Time Out 1130   PT Total Time (minutes) 30   PT Mode of treatment - Individual (minutes) 30   PT Mode of treatment - Concurrent (minutes) 0   PT Mode of treatment - Group (minutes) 0   PT Mode of treatment - Co-treat (minutes) 0   PT Mode of Treatment - Total time(minutes) 30 minutes   PT Cumulative Minutes 100

## 2022-12-16 NOTE — PROGRESS NOTES
[facility] Occupational Therapy Daily Note  Patient Active Problem List   Diagnosis    Type 2 diabetes mellitus without complication, with long-term current use of insulin ([facility] )    Benign essential hypertension    Mixed hyperlipidemia    Erectile dysfunction    Low testosterone    Obesity (BMI 30-39  9)    Testicular hypogonadism    Incarcerated umbilical hernia    Left ureteral calculus    Type 2 diabetes mellitus with hyperlipidemia (HCC)    Colonic mass    Microcytic anemia    Hypokalemia    Transaminitis    Thrombocytosis    Iron deficiency anemia, unspecified    Malignant neoplasm of transverse colon (HCC)    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)    Colon cancer metastasized to liver (HCC)    Colostomy prolapse (HCC)    Other fatigue    Cervical radiculopathy    Encephalopathy    Flash pulmonary edema (HCC)    MR (mitral regurgitation)    Bacteremia    ESBL (extended spectrum beta-lactamase) producing bacteria infection    Acute respiratory failure with hypoxia (HCC)    Severe protein-calorie malnutrition ([facility] )    Acute kidney injury ([facility] )       Past Medical History:   Diagnosis Date    Abdominal pain 03/12/2022    Acute renal failure ([facility] )     [id number] resolved    Cancer ([facility] )     Diabetes mellitus ([facility] )     Enteritis 08/23/2016    Gastroparesis due to DM ([facility] ) 08/23/2016    GERD (gastroesophageal reflux disease)     Hernia, ventral 08/04/2016    Hyperlipidemia     Hypertension     Morbid obesity ([facility] ) 04/17/2018    Postoperative visit 03/02/2022    SIRS (systemic inflammatory response syndrome) ([facility] ) 03/12/2022    Snoring     Stage 3a chronic kidney disease ([facility] ) 02/19/2022     Etiologic Diagnosis: Critical illness myopathy  Restrictions/Precautions  Precautions: Fall Risk, Contact/isolation, Multiple lines, Supervision on toilet/commode (Dillan drain, ileostomy, IR Drain)  ADL Team Goal: Patient will require assist with ADLs with least restrictive device upon completion of rehab program  Occupational Therapy LTG's  Eating Oral care Bathing LB dress UB dress   Eating Goal: 06  Independent - Patient completes the activity by him/herself with no assistance from a helper  Oral Hygiene Goal: 06  Independent - Patient completes the activity by him/herself with no assistance from a helper  Shower/bathe self Goal: 04  Supervision or touching assistance- [place] provides VERBAL CUES or supervision throughout activity  Lower body dressing Goal: 04  Supervision or touching assistance- [place] provides VERBAL CUES or supervision throughout activity  Upper body dressing Goal: 04  Supervision or touching assistance- [place] provides VERBAL CUES or supervision throughout activity  Toileting Toilet txf Func txf IADL Med    Toileting hygiene Goal: 04  Supervision or touching assistance- [place] provides VERBAL CUES or supervision throughout activity  Toilet transfer Goal: 04  Supervision or touching assistance- [place] provides VERBAL CUES or supervision throughout activity       Assist Level: Supervision   OT interventions: Treatment/Interventions: (P) ADL retraining, Functional transfer training, LE strengthening/ROM, Therapeutic exercise, Endurance training, Cognitive reorientation, Patient/family training, Equipment eval/education, Bed mobility, Compensatory technique education  Discharge Plan:  OT Discharge Recommendation: (P)  (Home with family support)   DME: Equipment Recommended: (P)  (TBD),  ,       12/16/22 0945 12/16/22 1240   Pain Assessment   Pain Assessment Tool  --  0-10   Pain Rating: FLACC (Activity) - Face  --  1   Pain Rating: FLACC (Activity) - Legs  --  1   Pain Rating: FLACC (Activity) - Activity  --  1   Pain Rating: FLACC (Activity) - Cry  --  1   Pain Rating: FLACC (Activity) - Consolability  --  1   Score: FLACC (Activity)  --  5   Lifestyle   Autonomy  --  "he's my [place] "   Upper Body Dressing   Type of Assistance Needed  --  Physical assistance   Physical Assistance Level  --  26%-50%   Comment  -- pt able to thread UE today, and don overhead  Pt unable to reach trunk with poor sitting tolerance  Upper Body Dressing CARE Score  --  3   Lower Body Dressing   Type of Assistance Needed  --  Physical assistance   Physical Assistance Level  --  Total assistance   Comment  --  Pt attempting to thread pants in sitting  unable to complete without assistance  Lower Body Dressing CARE Score  --  1   Therapeutic Excerise-Strength   UE Strength  --    (pt is R handed)   Right Upper Extremity- Strength   RUE Strength Comment  --  Gross grasp 35,30,30   Left Upper Extremity-Strength   LUE Strength Comment  --  gross grasp 25,15,15   Assessment   Treatment Assessment  --  Skilled OT/PT co-tx session with OT focus on UB and LB dressing, UE strength testing and cont rapport building  Pt in good spirits, willing to participate  Pt engaging in alternating sessions from PT and OT with focus on rest periods  Pt is emotional throughout with accomplishment goals for himself  Pts supportive show friend visits during session and Pt appears very happy to have company  Continued plan of care for OT sessions to focus on the following areas:  ADL Retraining , LB Dressing,  LHAE education/training, Functional Transfers, Functional Cognition, Standing tolerance, Standing balance , Fine motor coordination, Gross motor coordination, Fine motor strengthening , Gross motor strengthening , DME training/education, Family training/education, Energy conservation training/education, healthy coping education, Leisure and social pursuits, sitting balance and Core/trunk control/strengthening  next sessions focus on continued OOB tolerance, basic transfers to [facility], light unsupported sitting, light UE strengthening     Prognosis  --  Good   OT Therapy Minutes   OT Time In 0945 1240   OT Time Out 1030 1330   OT Total Time (minutes) 45 50   OT Mode of treatment - Individual (minutes) 45 0   OT Mode of treatment - Concurrent (minutes) 0 0   OT Mode of treatment - Group (minutes) 0 0   OT Mode of treatment - Co-treat (minutes) 0 50   OT Mode of Treatment - Total time(minutes) 45 minutes 50 minutes   OT Cumulative Minutes 160 210

## 2022-12-16 NOTE — PROGRESS NOTES
PM&R PROGRESS NOTE:  Luis Po 61 y o  male MRN: [id number]  Unit/Bed#: -60 Encounter: [id number]        Rehabilitation Diagnosis: Impairment of mobility, safety and Activities of Daily Living (ADLs) due to Neurologic Conditions:  03 8  Neuromuscular Disorders  Etiology: Critical Illness Myopathy    HPI: Clinton Thomas is a 61 y o  male with a medical history of HTN, HLD, GERD, and ventral hernia that presented to [facility] on 11/7 for an elective surgical procedure due to metastatic colon adenocarcinoma by Dr eMlida Gomez  [facility] abdomen showed transverse colonic apple core lesion and inumberable hepatic metastases  MRI revealed multiple hepatic metastasis with smaller lesions in left lobe and larger lesions in right lobe  Patient completed chemotherapy, colostomy at that time  On 11/7 patient underwent exploratory laparotomy, segment 3 liver resection, ablation, intraoperative ultrasound, and colostomy reversal  This was c/b left upper quadrant hematoma, imaging showed suspected leak from transverse colon anastomosis  On 11/16, patient underwent exp  Lap which revealed LUQ infected hematoma, defect in transverse colon anastomosis with extravasation of succus  Massively dilated cecum requiring resection  He had a right hemicolectomy, left in discontinuity and temporary abdominal closure  On 11/17, re-exploration, partial descending colectomy, primary colonic anastomosis created with diverting loop ileostomy and midline wound VAC placement  He completed course of IV abx for ESBL bacteremia from abdominal abscess  Midline abdominal incision wet to dry dressings and packing to old stoma site  12/6 abdominal incision noted to have yellow drainage  CT abdomen showed abdominal abscess and distended gallbladder  Patient underwent percutaneous cholecystostomy tube insertion and hepatectomy abscess drainage  ID consulted, IV abx for 1 week   Nephrology consulted d/t ALEXY, creatinine baseline 1 2-1 4  On 12/14, patient experienced chest pain with movement  Cardiology evaluated; troponin level 57; chest pain appeared musculoskeletal with no further work up  Patient deemed medically stable and admitted to [facility] on 12/14/2022  SUBJECTIVE: Patient seen face to face  Reports extreme fatigue after morning therapy yesterday; 900 minute therapy clarification  Patient states she had a great day with therapy today, in good spirits this afternoon  Decreased appetite, reports bloating and gaseous feeling with every meal limiting intake  Repeat Hbg 8 0  Ileostomy functioning  Cholecystostomy tube- sanguineous drainage  Reports mild abdominal tenderness, manageable  No CP, SOB, fever, chills, N/V/D     ASSESSMENT: Stable, progressing    PLAN:  1  Bloating/gaseous feeling with decreased appetite- Simethicone with meals and bedtime, encourage fluids  2  Hbg 8 0, 1 unit PRBc, recheck CBC 12/17 AM  3  Decreased intake/output: Encourage fluids  Monitor fluid intake and output, drainage from ileostomy, VICTORINO drain, and cholecystostomy tube  4  Abdominal tenderness  Pain adequately controlled with current regimen  Rehabilitation  • Functional deficits:    • Continue current rehabilitation plan of care to maximize function      • Functional update:   o PT: Awaiting evaluation  o OT: ADL- Max A, UB dressing- Mod A, LB dressing- total A; toileting- total assist    • Estimated Discharge: ADD 3 weeks    Pain  • Tylenol, oxycodone    DVT prophylaxis  • Heparin sc    Bladder plan  • Continent, urinal    Bowel plan  • ileostomy      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 liver [facility])  Assessment & Plan  · MRI-multiple hepatic metastasis; smaller lesions in left lobe, larger lesions in right lobe  · 11/7 Exp Lap, resection of hepatic segment 3, resection of transverse colon with reversal of loop colostomy (Dr Gabrielle Darnell)  · 11/16- right hemicolectomy, left discontinuity and temporary abdominal closure device placed  · 11/17- re-exploration, partial descending colectomy, primary colocolonic anastomosis with diverting loop ileostomy, midline VAC placement  · Abdominal incision- yellow drainage; CT showed abdominal abscess and distended gallbladder  · 12/8- IR percutaneous cholecystostomy tube, hepatectomy abscess drain placement  · Monitor incisions, dressings  · Monitor CBC, CMP  · IM consulted for assistance with management  · Follow-up outpatient with hem/onc    * Malignant neoplasm of transverse colon [facility])  Assessment & Plan  · Transverse colonic apple core lesion and innumerable hepatic metastases  · Colostomy 2/22  · RCW port-a-cath, accessed  · S/p palliative chemo  · Follows with Dr Milena Newton (palliative)  · Follows hem/onc (Dr Reyna Harrell)    Acute kidney injury [facility])  Assessment & Plan  · Creatinine baseline 1 2-1 4  · Current creatinine 1 38  · Consider consult nephrology  · Monitor BMP    Bacteremia  Assessment & Plan  · Abdominal abscess- ESBL E Coli  · Zosyn/Ertapenam 7 days (completed 12/15)  · Contact precautions  · VICTORINO drain- milky drainage  · Monitor CBC    Iron deficiency anemia, unspecified  Assessment & Plan  · Hbg 8 0 (previously 7 3)  · Consent obtained  · 12/15-1 unit PRBc  · Monitor CBC  · 12/16- symptomatic- 1 unit PRBc    Obesity (BMI 30-39  9)  Assessment & Plan  · BMI 33 0  · Diet and lifestyle modifications  · Nutrition consult    Benign essential hypertension  Assessment & Plan  · Home: Lisinopril  · Here: Norvasc 5 mg BID  · Adjust medication as needed  · IM consult for assistance with management  · Monitored outpatient by PCP    Type 2 diabetes mellitus without complication, with long-term current use of insulin (HCC)  Assessment & Plan  · HbgA1c 8 0 (9/22)  · Home monitoring with [facility]  · Home: Lantus 12 units, Humalog 3 units, Januvia  · Here: Humalog 3 units with meals, SSI, Lantus   · Follow with PCP (Dr Bakari Chaudhry)        [address] [place] [facility]    Labs, medications, and imaging reviewed  ROS:  Review of Systems   A 10 point review of systems was negative except for what is noted in the HPI  OBJECTIVE:   /79 (BP Location: Left arm)   Pulse 88   Temp 98 3 °F (36 8 °C) (Oral)   Resp 18   Ht 5' 10" (1 778 m)   Wt 104 kg (230 lb)   SpO2 94%   BMI 33 00 kg/m²     Physical Exam  Constitutional:       Appearance: He is obese  HENT:      Head: Normocephalic and atraumatic  Mouth/Throat:      Mouth: Mucous membranes are moist    Cardiovascular:      Rate and Rhythm: Normal rate and regular rhythm  Pulses: Normal pulses  Heart sounds: Normal heart sounds  Pulmonary:      Effort: Pulmonary effort is normal       Breath sounds: Normal breath sounds  Abdominal:      General: Bowel sounds are normal       Palpations: Abdomen is soft  Tenderness: There is abdominal tenderness  Comments: +Ileostomy, + right cholecystostomy drain, + left VICTORINO drain   Musculoskeletal:         General: Normal range of motion  Skin:     General: Skin is warm and dry  Capillary Refill: Capillary refill takes less than 2 seconds  Neurological:      Mental Status: He is alert and oriented to person, place, and time  Motor: Weakness present     Psychiatric:         Mood and Affect: Mood normal          Judgment: Judgment normal         MMT:   Strength:   Right  Left  Site  Right  Left  Site    4+ 4+  S Ab: Shoulder Abductors  4  4  HF: Hip Flexors    5 5  EF: Elbow Flexors  5  5 KF: Knee Flexors    5  5  EE: Elbow Extensors  5  5  KE: Knee Extensors    5  5  WE: Wrist Extensors  5  5  DR: Dorsi Flexors    5  5  FF: Finger Flexors  4  4  PF: Plantar Flexors    5  5  HI: Hand Intrinsics  5  5  EHL: Extensor Hallucis Longus     Lab Results   Component Value Date    WBC 10 52 (H) 12/16/2022    HGB 8 0 (L) 12/16/2022    HCT 24 8 (L) 12/16/2022    MCV 84 12/16/2022     12/16/2022     Lab Results   Component Value Date    SODIUM 134 (L) 12/15/2022    K 3 9 12/15/2022     12/15/2022    CO2 26 12/15/2022    BUN 15 12/15/2022    CREATININE 1 38 (H) 12/15/2022    GLUC 135 12/15/2022    CALCIUM 8 4 12/15/2022     Lab Results   Component Value Date    INR 1 10 11/12/2022    INR 1 24 (H) 11/09/2022    INR 1 0 09/26/2022    PROTIME 14 4 11/12/2022    PROTIME 15 8 (H) 11/09/2022    PROTIME 10 6 09/26/2022       Current Facility-Administered Medications:   •  acetaminophen (TYLENOL) tablet 975 mg, 975 mg, Oral, Q8H [address], GAURANG Keith, 975 mg at 12/16/22 0417  •  amLODIPine (NORVASC) tablet 5 mg, 5 mg, Oral, BID, GAURANG Keith, 5 mg at 12/16/22 8563  •  aspirin chewable tablet 81 mg, 81 mg, Oral, Daily, GAURANG Keith, 81 mg at 12/16/22 9176  •  atorvastatin (LIPITOR) tablet 40 mg, 40 mg, Oral, Daily, GAURANG Keith, 40 mg at 12/16/22 9233  •  calcium carbonate (TUMS) chewable tablet 500 mg, 500 mg, Oral, BID PRN, GAURANG Keith, 500 mg at 12/16/22 7145  •  cholecalciferol (VITAMIN D3) tablet 400 Units, 400 Units, Oral, Daily, GAURANG Keith, 400 Units at 12/16/22 5555  •  heparin (porcine) subcutaneous injection 5,000 Units, 5,000 Units, Subcutaneous, Q8H [address], GAURANG Keith, 5,000 Units at 12/16/22 0416  •  insulin glargine (LANTUS) subcutaneous injection 12 Units 0 12 mL, 12 Units, Subcutaneous, HS, GAURANG Keith, 12 Units at 12/15/22 2122  •  insulin lispro (HumaLOG) 100 units/mL subcutaneous injection 1-5 Units, 1-5 Units, Subcutaneous, HS, GAURANG Keith, 1 Units at 12/15/22 2122  •  insulin lispro (HumaLOG) 100 units/mL subcutaneous injection 1-6 Units, 1-6 Units, Subcutaneous, TID AC, 1 Units at 12/15/22 1709 **AND** Fingerstick Glucose (POCT), , , TID AC, GAURANG Keith  •  insulin lispro (HumaLOG) 100 units/mL subcutaneous injection 4 Units, 4 Units, Subcutaneous, TID With Meals, GAURANG Portillo  •  melatonin tablet 3 mg, 3 mg, Oral, HS, GAURANG Keith  •  methocarbamol (ROBAXIN) tablet 500 mg, 500 mg, Oral, BID, GAURANG Keith, 500 mg at 12/16/22 0131  •  multivitamin-minerals (CENTRUM) tablet 1 tablet, 1 tablet, Oral, Daily, GAURANG Keith, 1 tablet at 12/16/22 3485  •  ondansetron (ZOFRAN-ODT) dispersible tablet 4 mg, 4 mg, Oral, Q6H PRN, GAURANG Keith, 4 mg at 12/16/22 7241  •  oxyCODONE (ROXICODONE) immediate release tablet 10 mg, 10 mg, Oral, Q6H PRN, GAURANG Keith, 10 mg at 12/16/22 1841  •  oxyCODONE (ROXICODONE) IR tablet 5 mg, 5 mg, Oral, Q6H PRN, GAURANG Keith  •  pantoprazole (PROTONIX) EC tablet 40 mg, 40 mg, Oral, BID AC, GAURANG Keith, 40 mg at 12/16/22 0550  •  simethicone (MYLICON) chewable tablet 80 mg, 80 mg, Oral, 4x Daily (with meals and at bedtime), GAURANG Tobar  •  tamsulosin (FLOMAX) capsule 0 4 mg, 0 4 mg, Oral, Daily With Dinner, GAURANG Tobar, 0 4 mg at 12/15/22 1541    Past Medical History:   Diagnosis Date   • Abdominal pain 03/12/2022   • Acute renal failure ([place] [address] )     [id number] resolved   • Cancer ([place] [address] )    • Diabetes mellitus ([place] [address] )    • Enteritis 08/23/2016   • Gastroparesis due to DM ([place] [address] ) 08/23/2016   • GERD (gastroesophageal reflux disease)    • Hernia, ventral 08/04/2016   • Hyperlipidemia    • Hypertension    • Morbid obesity ([place] [address] ) 04/17/2018   • Postoperative visit 03/02/2022   • SIRS (systemic inflammatory response syndrome) ([place] [address] ) 03/12/2022   • Snoring    • Stage 3a chronic kidney disease ([place] [address] ) 02/19/2022       Patient Active Problem List    Diagnosis Date Noted   • 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 liver ([place] [address] ) 03/02/2022   • Malignant neoplasm of transverse colon ([place] [address] ) 03/01/2022   • Severe protein-calorie malnutrition ([place] [address] ) 12/14/2022   • Acute kidney injury ([place] [address] ) 12/14/2022   • Flash pulmonary edema ([place] [address] ) 11/12/2022   • MR (mitral regurgitation) 11/12/2022   • Bacteremia 11/12/2022   • ESBL (extended spectrum beta-lactamase) producing bacteria infection 11/12/2022   • Acute respiratory failure with hypoxia ([place] [address] ) 11/12/2022   • Encephalopathy 11/09/2022   • Cervical radiculopathy 10/26/2022   • Other fatigue 06/15/2022   • Colostomy prolapse ([place] [address] ) 05/27/2022   • Colon cancer metastasized to liver ([place] [address] ) 03/12/2022   • Iron deficiency anemia, unspecified 03/01/2022   • Thrombocytosis 02/21/2022   • Hypokalemia 02/19/2022   • Transaminitis 02/19/2022   • Type 2 diabetes mellitus with hyperlipidemia ([place] [address] ) 02/18/2022   • Colonic mass 02/18/2022   • Microcytic anemia 02/18/2022   • Left ureteral calculus 01/30/2020   • Incarcerated umbilical hernia 70/86/5226   • Testicular hypogonadism 06/19/2017   • Low testosterone 05/30/2017   • Type 2 diabetes mellitus without complication, with long-term current use of insulin ([place] [address] ) 08/23/2016   • Benign essential hypertension 08/23/2016   • Mixed hyperlipidemia 08/23/2016   • Erectile dysfunction 07/11/2016   • Obesity (BMI 30-39 9) 07/11/2016      GAURANG Fernández  Physical Medicine and [facility]    Total visit time: 30 minutes, with more than 50% spent counseling/coordinating care  Counseling includes discussion with patient re: progress in therapies, functional issues observed by therapy staff, and discussion with patient regarding their current medical state and wellbeing  Coordination of patient's care was performed in conjunction with Internal Medicine service to monitor patient's labs, vitals, and management of their comorbidities

## 2022-12-17 ENCOUNTER — APPOINTMENT (INPATIENT)
Dept: RADIOLOGY | Facility: HOSPITAL | Age: 57
End: 2022-12-17

## 2022-12-17 PROBLEM — A41.9 SEPSIS (HCC): Status: ACTIVE | Noted: 2022-12-17

## 2022-12-17 LAB
ABO GROUP BLD BPU: NORMAL
ALBUMIN SERPL BCP-MCNC: 1.7 G/DL (ref 3.5–5)
ALBUMIN SERPL BCP-MCNC: 1.8 G/DL (ref 3.5–5)
ALP SERPL-CCNC: 1060 U/L (ref 46–116)
ALP SERPL-CCNC: 1185 U/L (ref 46–116)
ALT SERPL W P-5'-P-CCNC: 17 U/L (ref 12–78)
ALT SERPL W P-5'-P-CCNC: 19 U/L (ref 12–78)
ANION GAP SERPL CALCULATED.3IONS-SCNC: 7 MMOL/L (ref 4–13)
AST SERPL W P-5'-P-CCNC: 77 U/L (ref 5–45)
AST SERPL W P-5'-P-CCNC: 86 U/L (ref 5–45)
BASOPHILS # BLD AUTO: 0.03 THOUSANDS/ÂΜL (ref 0–0.1)
BASOPHILS NFR BLD AUTO: 0 % (ref 0–1)
BILIRUB DIRECT SERPL-MCNC: 1.06 MG/DL (ref 0–0.2)
BILIRUB SERPL-MCNC: 1.61 MG/DL (ref 0.2–1)
BILIRUB SERPL-MCNC: 1.76 MG/DL (ref 0.2–1)
BPU ID: NORMAL
BUN SERPL-MCNC: 14 MG/DL (ref 5–25)
CALCIUM ALBUM COR SERPL-MCNC: 10.2 MG/DL (ref 8.3–10.1)
CALCIUM SERPL-MCNC: 8.4 MG/DL (ref 8.3–10.1)
CHLORIDE SERPL-SCNC: 104 MMOL/L (ref 96–108)
CO2 SERPL-SCNC: 25 MMOL/L (ref 21–32)
CREAT SERPL-MCNC: 1.37 MG/DL (ref 0.6–1.3)
CROSSMATCH: NORMAL
EOSINOPHIL # BLD AUTO: 0 THOUSAND/ÂΜL (ref 0–0.61)
EOSINOPHIL NFR BLD AUTO: 0 % (ref 0–6)
ERYTHROCYTE [DISTWIDTH] IN BLOOD BY AUTOMATED COUNT: 17.2 % (ref 11.6–15.1)
GFR SERPL CREATININE-BSD FRML MDRD: 56 ML/MIN/1.73SQ M
GGT SERPL-CCNC: 1504 U/L (ref 5–85)
GLUCOSE SERPL-MCNC: 165 MG/DL (ref 65–140)
GLUCOSE SERPL-MCNC: 185 MG/DL (ref 65–140)
GLUCOSE SERPL-MCNC: 201 MG/DL (ref 65–140)
GLUCOSE SERPL-MCNC: 211 MG/DL (ref 65–140)
GLUCOSE SERPL-MCNC: 215 MG/DL (ref 65–140)
GLUCOSE SERPL-MCNC: 228 MG/DL (ref 65–140)
GLUCOSE SERPL-MCNC: 233 MG/DL (ref 65–140)
HCT VFR BLD AUTO: 29.2 % (ref 36.5–49.3)
HGB BLD-MCNC: 9.3 G/DL (ref 12–17)
IMM GRANULOCYTES # BLD AUTO: 0.26 THOUSAND/UL (ref 0–0.2)
IMM GRANULOCYTES NFR BLD AUTO: 1 % (ref 0–2)
LYMPHOCYTES # BLD AUTO: 1.41 THOUSANDS/ÂΜL (ref 0.6–4.47)
LYMPHOCYTES NFR BLD AUTO: 8 % (ref 14–44)
MCH RBC QN AUTO: 27 PG (ref 26.8–34.3)
MCHC RBC AUTO-ENTMCNC: 31.8 G/DL (ref 31.4–37.4)
MCV RBC AUTO: 85 FL (ref 82–98)
MONOCYTES # BLD AUTO: 1.89 THOUSAND/ÂΜL (ref 0.17–1.22)
MONOCYTES NFR BLD AUTO: 10 % (ref 4–12)
NEUTROPHILS # BLD AUTO: 14.65 THOUSANDS/ÂΜL (ref 1.85–7.62)
NEUTS SEG NFR BLD AUTO: 81 % (ref 43–75)
NRBC BLD AUTO-RTO: 0 /100 WBCS
PLATELET # BLD AUTO: 294 THOUSANDS/UL (ref 149–390)
PMV BLD AUTO: 9.2 FL (ref 8.9–12.7)
POTASSIUM SERPL-SCNC: 3.7 MMOL/L (ref 3.5–5.3)
PROT SERPL-MCNC: 7.5 G/DL (ref 6.4–8.4)
PROT SERPL-MCNC: 8.2 G/DL (ref 6.4–8.4)
RBC # BLD AUTO: 3.44 MILLION/UL (ref 3.88–5.62)
SODIUM SERPL-SCNC: 136 MMOL/L (ref 135–147)
UNIT DISPENSE STATUS: NORMAL
UNIT PRODUCT CODE: NORMAL
UNIT PRODUCT VOLUME: 350 ML
UNIT RH: NORMAL
WBC # BLD AUTO: 18.24 THOUSAND/UL (ref 4.31–10.16)

## 2022-12-17 RX ADMIN — INSULIN LISPRO 4 UNITS: 100 INJECTION, SOLUTION INTRAVENOUS; SUBCUTANEOUS at 11:26

## 2022-12-17 RX ADMIN — PANTOPRAZOLE SODIUM 40 MG: 40 TABLET, DELAYED RELEASE ORAL at 17:23

## 2022-12-17 RX ADMIN — CHOLECALCIFEROL TAB 10 MCG (400 UNIT) 400 UNITS: 10 TAB at 08:51

## 2022-12-17 RX ADMIN — INSULIN LISPRO 3 UNITS: 100 INJECTION, SOLUTION INTRAVENOUS; SUBCUTANEOUS at 11:24

## 2022-12-17 RX ADMIN — INSULIN LISPRO 4 UNITS: 100 INJECTION, SOLUTION INTRAVENOUS; SUBCUTANEOUS at 19:29

## 2022-12-17 RX ADMIN — ACETAMINOPHEN 975 MG: 325 TABLET, FILM COATED ORAL at 13:18

## 2022-12-17 RX ADMIN — TAMSULOSIN HYDROCHLORIDE 0.4 MG: 0.4 CAPSULE ORAL at 17:17

## 2022-12-17 RX ADMIN — METHOCARBAMOL 500 MG: 500 TABLET ORAL at 17:17

## 2022-12-17 RX ADMIN — HEPARIN SODIUM 5000 UNITS: 5000 INJECTION INTRAVENOUS; SUBCUTANEOUS at 22:11

## 2022-12-17 RX ADMIN — PANTOPRAZOLE SODIUM 40 MG: 40 TABLET, DELAYED RELEASE ORAL at 06:26

## 2022-12-17 RX ADMIN — SIMETHICONE 80 MG: 80 TABLET, CHEWABLE ORAL at 17:17

## 2022-12-17 RX ADMIN — OXYCODONE HYDROCHLORIDE 10 MG: 10 TABLET ORAL at 17:18

## 2022-12-17 RX ADMIN — HEPARIN SODIUM 5000 UNITS: 5000 INJECTION INTRAVENOUS; SUBCUTANEOUS at 06:26

## 2022-12-17 RX ADMIN — INSULIN LISPRO 1 UNITS: 100 INJECTION, SOLUTION INTRAVENOUS; SUBCUTANEOUS at 22:10

## 2022-12-17 RX ADMIN — ASPIRIN 81 MG CHEWABLE TABLET 81 MG: 81 TABLET CHEWABLE at 08:51

## 2022-12-17 RX ADMIN — IOHEXOL 100 ML: 350 INJECTION, SOLUTION INTRAVENOUS at 17:42

## 2022-12-17 RX ADMIN — INSULIN LISPRO 4 UNITS: 100 INJECTION, SOLUTION INTRAVENOUS; SUBCUTANEOUS at 08:58

## 2022-12-17 RX ADMIN — METHOCARBAMOL 500 MG: 500 TABLET ORAL at 08:51

## 2022-12-17 RX ADMIN — AMLODIPINE BESYLATE 5 MG: 5 TABLET ORAL at 17:17

## 2022-12-17 RX ADMIN — AMLODIPINE BESYLATE 5 MG: 5 TABLET ORAL at 08:51

## 2022-12-17 RX ADMIN — ACETAMINOPHEN 975 MG: 325 TABLET, FILM COATED ORAL at 22:06

## 2022-12-17 RX ADMIN — ATORVASTATIN CALCIUM 40 MG: 40 TABLET, FILM COATED ORAL at 08:52

## 2022-12-17 RX ADMIN — ERTAPENEM SODIUM 1000 MG: 1 INJECTION, POWDER, LYOPHILIZED, FOR SOLUTION INTRAMUSCULAR; INTRAVENOUS at 13:13

## 2022-12-17 RX ADMIN — Medication 1 TABLET: at 08:51

## 2022-12-17 RX ADMIN — SIMETHICONE 80 MG: 80 TABLET, CHEWABLE ORAL at 08:51

## 2022-12-17 RX ADMIN — ONDANSETRON 4 MG: 4 TABLET, ORALLY DISINTEGRATING ORAL at 08:51

## 2022-12-17 RX ADMIN — HEPARIN SODIUM 5000 UNITS: 5000 INJECTION INTRAVENOUS; SUBCUTANEOUS at 13:17

## 2022-12-17 RX ADMIN — SIMETHICONE 80 MG: 80 TABLET, CHEWABLE ORAL at 11:27

## 2022-12-17 RX ADMIN — INSULIN GLARGINE 12 UNITS: 100 INJECTION, SOLUTION SUBCUTANEOUS at 22:09

## 2022-12-17 RX ADMIN — MELATONIN TAB 3 MG 3 MG: 3 TAB at 22:06

## 2022-12-17 RX ADMIN — INSULIN LISPRO 1 UNITS: 100 INJECTION, SOLUTION INTRAVENOUS; SUBCUTANEOUS at 08:53

## 2022-12-17 RX ADMIN — ACETAMINOPHEN 975 MG: 325 TABLET, FILM COATED ORAL at 06:26

## 2022-12-17 NOTE — PLAN OF CARE
Problem: Prexisting or High Potential for Compromised Skin Integrity  Goal: Skin integrity is maintained or improved  Description: INTERVENTIONS:  - Identify patients at risk for skin breakdown  - Assess and monitor skin integrity  - Assess and monitor nutrition and hydration status  - Monitor labs   - Assess for incontinence   - Turn and reposition patient  - Assist with mobility/ambulation  - Relieve pressure over bony prominences  - Avoid friction and shearing  - Provide appropriate hygiene as needed including keeping skin clean and dry  - Evaluate need for skin moisturizer/barrier cream  - Collaborate with interdisciplinary team   - Patient/family teaching  - Consider wound care consult   Outcome: Progressing     Problem: Potential for Falls  Goal: Patient will remain free of falls  Description: INTERVENTIONS:  - Educate patient/family on patient safety including physical limitations  - Instruct patient to call for assistance with activity   - Consult OT/PT to assist with strengthening/mobility   - Keep Call bell within reach  - Keep bed low and locked with side rails adjusted as appropriate  - Keep care items and personal belongings within reach  - Initiate and maintain comfort rounds  - Make Fall Risk Sign visible to staff  - Offer Toileting every 2 Hours, in advance of need  - Initiate/Maintain bed/chair alarm  - Obtain necessary fall risk management equipment: non skid footwear  - Apply yellow socks and bracelet for high fall risk patients  - Consider moving patient to room near nurses station  Outcome: Progressing     Problem: PAIN - ADULT  Goal: Verbalizes/displays adequate comfort level or baseline comfort level  Description: Interventions:  - Encourage patient to monitor pain and request assistance  - Assess pain using appropriate pain scale  - Administer analgesics based on type and severity of pain and evaluate response  - Implement non-pharmacological measures as appropriate and evaluate response  - Consider cultural and social influences on pain and pain management  - Notify physician/advanced practitioner if interventions unsuccessful or patient reports new pain  Outcome: Progressing     Problem: INFECTION - ADULT  Goal: Absence or prevention of progression during hospitalization  Description: INTERVENTIONS:  - Assess and monitor for signs and symptoms of infection  - Monitor lab/diagnostic results  - Monitor all insertion sites, i e  indwelling lines, tubes, and drains  - Monitor endotracheal if appropriate and nasal secretions for changes in amount and color  - [place] appropriate cooling/warming therapies per order  - Administer medications as ordered  - Instruct and encourage patient and family to use good hand hygiene technique  - Identify and instruct in appropriate isolation precautions for identified infection/condition  Outcome: Progressing     Problem: SAFETY ADULT  Goal: Patient will remain free of falls  Description: INTERVENTIONS:  - Educate patient/family on patient safety including physical limitations  - Instruct patient to call for assistance with activity   - Consult OT/PT to assist with strengthening/mobility   - Keep Call bell within reach  - Keep bed low and locked with side rails adjusted as appropriate  - Keep care items and personal belongings within reach  - Initiate and maintain comfort rounds  - Make Fall Risk Sign visible to staff  - Offer Toileting every 2 Hours, in advance of need  - Initiate/Maintain bed/chair alarm  - Obtain necessary fall risk management equipment: non skid footwear  - Apply yellow socks and bracelet for high fall risk patients  - Consider moving patient to room near nurses station  Outcome: Progressing  Goal: Maintain or return to baseline ADL function  Description: INTERVENTIONS:  -  Assess patient's ability to carry out ADLs; assess patient's baseline for ADL function and identify physical deficits which impact ability to perform ADLs (bathing, care of mouth/teeth, toileting, grooming, dressing, etc )  - Assess/evaluate cause of self-care deficits   - Assess range of motion  - Assess patient's mobility; develop plan if impaired  - Assess patient's need for assistive devices and provide as appropriate  - Encourage maximum independence but intervene and supervise when necessary  - Involve family in performance of ADLs  - Assess for home care needs following discharge   - Consider OT consult to assist with ADL evaluation and planning for discharge  - Provide patient education as appropriate  Outcome: Progressing  Goal: Maintains/Returns to pre admission functional level  Description: INTERVENTIONS:  - Perform BMAT or MOVE assessment daily    - Set and communicate daily mobility goal to care team and patient/family/caregiver  - Collaborate with rehabilitation services on mobility goals if consulted  - Perform Range of Motion 3 times a day  - Reposition patient every 2 hours    - Dangle patient 3 times a day  - Stand patient 3 times a day  - Ambulate patient 3 times a day  - Out of bed to chair 3 times a day   - Out of bed for meals 3 times a day  - Out of bed for toileting  - Record patient progress and toleration of activity level   Outcome: Progressing     Problem: DISCHARGE PLANNING  Goal: Discharge to home or other facility with appropriate resources  Description: INTERVENTIONS:  - Identify barriers to discharge w/patient and caregiver  - Arrange for needed discharge resources and transportation as appropriate  - Identify discharge learning needs (meds, wound care, etc )  - Arrange for interpretive services to assist at discharge as needed  - Refer to Case Management Department for coordinating discharge planning if the patient needs post-hospital services based on physician/advanced practitioner order or complex needs related to functional status, cognitive ability, or social support system  Outcome: Progressing     Problem: Nutrition/Hydration-ADULT  Goal: Nutrient/Hydration intake appropriate for improving, restoring or maintaining nutritional needs  Description: Monitor and assess patient's nutrition/hydration status for malnutrition  Collaborate with interdisciplinary team and initiate plan and interventions as ordered  Monitor patient's weight and dietary intake as ordered or per policy  Utilize nutrition screening tool and intervene as necessary  Determine patient's food preferences and provide high-protein, high-caloric foods as appropriate       INTERVENTIONS:  - Monitor oral intake, urinary output, labs, and treatment plans  - Assess nutrition and hydration status and recommend course of action  - Evaluate amount of meals eaten  - Assist patient with eating if necessary   - Allow adequate time for meals  - Recommend/ encourage appropriate diets, oral nutritional supplements, and vitamin/mineral supplements  - Order, calculate, and assess calorie counts as needed  - Recommend, monitor, and adjust tube feedings and TPN/PPN based on assessed needs  - Assess need for intravenous fluids  - Provide specific nutrition/hydration education as appropriate  - Include patient/family/caregiver in decisions related to nutrition  Outcome: Progressing     Problem: MOBILITY - ADULT  Goal: Maintain or return to baseline ADL function  Description: INTERVENTIONS:  -  Assess patient's ability to carry out ADLs; assess patient's baseline for ADL function and identify physical deficits which impact ability to perform ADLs (bathing, care of mouth/teeth, toileting, grooming, dressing, etc )  - Assess/evaluate cause of self-care deficits   - Assess range of motion  - Assess patient's mobility; develop plan if impaired  - Assess patient's need for assistive devices and provide as appropriate  - Encourage maximum independence but intervene and supervise when necessary  - Involve family in performance of ADLs  - Assess for home care needs following discharge   - Consider OT consult to assist with ADL evaluation and planning for discharge  - Provide patient education as appropriate  Outcome: Progressing  Goal: Maintains/Returns to pre admission functional level  Description: INTERVENTIONS:  - Perform BMAT or MOVE assessment daily    - Set and communicate daily mobility goal to care team and patient/family/caregiver  - Collaborate with rehabilitation services on mobility goals if consulted  - Perform Range of Motion 3 times a day  - Reposition patient every 2 hours    - Dangle patient 3 times a day  - Stand patient 3 times a day  - Ambulate patient 3 times a day  - Out of bed to chair 3 times a day   - Out of bed for meals 3 times a day  - Out of bed for toileting  - Record patient progress and toleration of activity level   Outcome: Progressing

## 2022-12-17 NOTE — PROGRESS NOTES
PM&R PROGRESS NOTE:  Ophelia Robles 61 y o  male MRN: [id number]  Unit/Bed#: -43 Encounter: [id number]        Rehabilitation Diagnosis: Impairment of mobility, safety and Activities of Daily Living (ADLs) due to Neurologic Conditions:  03 8  Neuromuscular Disorders  Etiology: Critical Illness Myopathy    HPI: Tre Adkins is a 61 y o  male with a medical history of HTN, HLD, GERD, and ventral hernia that [facility] for an elective surgical procedure due to metastatic colon adenocarcinoma by Dr Rodriguez Flank  [facility] abdomen showed transverse colonic apple core lesion and inumberable hepatic metastases  MRI revealed multiple hepatic metastasis with smaller lesions in left lobe and larger lesions in right lobe  Patient completed chemotherapy, colostomy at that time  On 11/7 patient underwent exploratory laparotomy, segment 3 liver resection, ablation, intraoperative ultrasound, and colostomy reversal  This was c/b left upper quadrant hematoma, imaging showed suspected leak from transverse colon anastomosis  On 11/16, patient underwent exp  Lap which revealed LUQ infected hematoma, defect in transverse colon anastomosis with extravasation of succus  Massively dilated cecum requiring resection  He had a right hemicolectomy, left in discontinuity and temporary abdominal closure  On 11/17, re-exploration, partial descending colectomy, primary colonic anastomosis created with diverting loop ileostomy and midline wound VAC placement  He completed course of IV abx for ESBL bacteremia from abdominal abscess  Midline abdominal incision wet to dry dressings and packing to old stoma site  12/6 abdominal incision noted to have yellow drainage  CT abdomen showed abdominal abscess and distended gallbladder  Patient underwent percutaneous cholecystostomy tube insertion and hepatectomy abscess drainage  ID consulted, IV abx for 1 week   Nephrology consulted d/t ALEXY, creatinine baseline 1 2-1 4  On 12/14, patient experienced chest pain with movement  Cardiology evaluated; troponin level 57; chest pain appeared musculoskeletal with no further work up  Patient deemed medically stable and admitted to [facility] on 12/14/2022  SUBJECTIVE: Patient seen and examined in room  High output from ileostomy today, low yesterday, and high the day prior  Tachycardia at times, but is extremely deconditioned and relatively improved  Elevated temperatures without fever  Had 1 unit pRBC on 12/15 and 12/16 for hemoglobin 7 3 and some of this may be related to the transfusion  WBC count did increase today to 18  Given this information in the setting of known abscess/perc radha tube and off antibiotics since 12/12 (last dose of Zosyn), will have ID weigh in on case  Spoke with them about case briefly  ID saw and rec CT A/P, blood cultures, and restarting the Ertapenem at this time  Low threshold to return to acute care especially if unable to participate in therapy  ASSESSMENT: Stable, progressing    PLAN:  1  Hbg 8 0, 1 unit PRBc, recheck CBC 12/17 AM- Hgb up to 9 3  2  Increased leukocytosis- Consult ID, Restarted on Ertapenem, Blood cultures, CT A/P  [place] better for a period of time, but unable to participate in therapy today  3  Decreased intake/output: Encourage fluids  Monitor fluid intake and output, drainage from ileostomy, VICTORINO drain, and cholecystostomy tube  Rehabilitation  • Functional deficits:  Self care, mobility  • Continue current rehabilitation plan of care to maximize function      • Functional update:   o PT: Awaiting evaluation  o OT: ADL- Max A, UB dressing- Mod A, LB dressing- total A; toileting- total assist    • Estimated Discharge: ADD 3 weeks    Pain  • Tylenol, oxycodone    DVT prophylaxis  • Heparin sc    Bladder plan  • Continent, urinal    Bowel plan  • ileostomy      * Malignant neoplasm of transverse colon [facility])  Assessment & Plan  · Transverse colonic apple core lesion and innumerable hepatic metastases  · Colostomy 2/22  · RCW port-a-cath, accessed  · S/p palliative chemo  · Follows with Dr Neymar Earl (palliative)  · Follows hem/onc (Dr Michael Mark)    Sepsis [facility])  Assessment & Plan  · SIRS versus sepsis at this time given his vitals, leukocytosis, and complicated infection history  · Had finished his course of Zosyn on 1212 and ertapenem by 1215  · Consultation to infectious disease, appreciate their recommendations  · Drawing blood cultures x2 restarting his ertapenem and obtaining a CT abdomen and pelvis at this time  · Continued work-up and treatment here on [facility] at this time however if clinically deteriorates or unable to participate in therapy will likely need transfer back to acute care    Acute kidney injury [facility])  Assessment & Plan  · Creatinine baseline 1 2-1 4  · Current creatinine 1 38  · Consider consult nephrology  · Monitor BMP    Bacteremia  Assessment & Plan  · Abdominal abscess- ESBL E Coli  · Zosyn/Ertapenam 7 days (Ertapenem completed 12/15, Zosyn 12/12)  · Contact precautions  · VICTORINO drain- milky drainage  · Monitor CBC    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 liver [facility])  Assessment & Plan  · MRI-multiple hepatic metastasis; smaller lesions in left lobe, larger lesions in right lobe  · 11/7 Exp Lap, resection of hepatic segment 3, resection of transverse colon with reversal of loop colostomy (Dr Laurie Reese)  · 11/16- right hemicolectomy, left discontinuity and temporary abdominal closure device placed  · 11/17- re-exploration, partial descending colectomy, primary colocolonic anastomosis with diverting loop ileostomy, midline VAC placement  · Abdominal incision- yellow drainage; CT showed abdominal abscess and distended gallbladder  · 12/8- IR percutaneous cholecystostomy tube, hepatectomy abscess drain placement  · Monitor incisions, dressings  · Monitor CBC, CMP  · IM consulted for assistance with management  · Follow-up outpatient with hem/onc    Iron deficiency anemia, unspecified  Assessment & Plan  · Hbg 8 0 (previously 7 3)  · Consent obtained  · 12/15-1 unit PRBc  · Monitor CBC  · 12/16- symptomatic- 1 unit PRBc    Obesity (BMI 30-39  9)  Assessment & Plan  · BMI 33 0  · Diet and lifestyle modifications  · Nutrition consult    Benign essential hypertension  Assessment & Plan  · Home: Lisinopril  · Here: Norvasc 5 mg BID  · Adjust medication as needed  · IM consult for assistance with management  · Monitored outpatient by PCP    Type 2 diabetes mellitus without complication, with long-term current use of insulin (HCC)  Assessment & Plan  · HbgA1c 8 0 (9/22)  · Home monitoring with Nilesh  · Home: Lantus 12 units, Humalog 3 units, Januvia  · Here: Humalog 3 units with meals, SSI, Lantus   · Follow with PCP (Dr Michell Nguyễn)        [address] [place] [facility]  Labs, medications, and imaging reviewed  ROS:  Review of Systems   Constitutional: Negative for chills and fever  HENT: Negative for hearing loss and trouble swallowing  Eyes: Negative for photophobia and visual disturbance  Respiratory: Negative for cough and shortness of breath  Cardiovascular: Negative for chest pain and leg swelling  Gastrointestinal: Positive for nausea  Negative for constipation, diarrhea and vomiting  Endocrine: Negative for cold intolerance and heat intolerance  Genitourinary: Negative for dysuria and hematuria  Musculoskeletal: Negative for back pain and neck pain  Skin: Positive for pallor and wound  Negative for rash  Allergic/Immunologic: Positive for food allergies  Negative for environmental allergies  Neurological: Positive for weakness  Negative for dizziness and light-headedness  Hematological: Negative for adenopathy  Does not bruise/bleed easily  Psychiatric/Behavioral: Positive for dysphoric mood  Negative for sleep disturbance          OBJECTIVE:   /70 (BP Location: Left arm)   Pulse 90   Temp 98 9 °F (37 2 °C) (Oral)   Resp 16   Ht 5' 10" (1 778 m)   Wt 104 kg (230 lb)   SpO2 97%   BMI 33 00 kg/m²     Physical Exam  Constitutional:       Appearance: He is obese  He is ill-appearing  HENT:      Head: Normocephalic and atraumatic  Nose: Nose normal  No rhinorrhea  Mouth/Throat:      Mouth: Mucous membranes are moist       Pharynx: Oropharynx is clear  Eyes:      General: No scleral icterus  Cardiovascular:      Rate and Rhythm: Normal rate  Pulses: Normal pulses  Pulmonary:      Effort: Pulmonary effort is normal  No respiratory distress  Breath sounds: Normal breath sounds  Abdominal:      General: Bowel sounds are normal       Palpations: Abdomen is soft  Tenderness: There is abdominal tenderness  Comments: +Ileostomy, + right cholecystostomy drain, + left VICTORINO drain   Musculoskeletal:         General: Normal range of motion  Skin:     General: Skin is dry  Neurological:      Mental Status: He is alert and oriented to person, place, and time  Motor: Weakness present        Comments: Proximal leg weakness>proximal upper limb weakness   Psychiatric:         Mood and Affect: Mood normal          Judgment: Judgment normal           Lab Results   Component Value Date    WBC 18 24 (H) 12/17/2022    HGB 9 3 (L) 12/17/2022    HCT 29 2 (L) 12/17/2022    MCV 85 12/17/2022     12/17/2022     Lab Results   Component Value Date    SODIUM 136 12/17/2022    K 3 7 12/17/2022     12/17/2022    CO2 25 12/17/2022    BUN 14 12/17/2022    CREATININE 1 37 (H) 12/17/2022    GLUC 201 (H) 12/17/2022    CALCIUM 8 4 12/17/2022     Lab Results   Component Value Date    INR 1 10 11/12/2022    INR 1 24 (H) 11/09/2022    INR 1 0 09/26/2022    PROTIME 14 4 11/12/2022    PROTIME 15 8 (H) 11/09/2022    PROTIME 10 6 09/26/2022       Current Facility-Administered Medications:   •  acetaminophen (TYLENOL) tablet 975 mg, 975 mg, Oral, Q8H [facility], GAURANG Keith, 975 mg at 12/17/22 1318  •  amLODIPine (NORVASC) tablet 5 mg, 5 mg, Oral, BID, GAURANG Keith, 5 mg at 12/17/22 3564  •  aspirin chewable tablet 81 mg, 81 mg, Oral, Daily, GAURANG Keith, 81 mg at 12/17/22 0851  •  atorvastatin (LIPITOR) tablet 40 mg, 40 mg, Oral, Daily, GAURANG Keith, 40 mg at 12/17/22 1084  •  calcium carbonate (TUMS) chewable tablet 500 mg, 500 mg, Oral, BID PRN, GAURANG Keith, 500 mg at 12/16/22 4786  •  cholecalciferol (VITAMIN D3) tablet 400 Units, 400 Units, Oral, Daily, GAURANG Keith, 400 Units at 12/17/22 0851  •  ertapenem (INVanz) 1,000 mg in sodium chloride 0 9 % 50 mL IVPB, 1,000 mg, Intravenous, Q24H, Jesse Ernst MD, Stopped at 12/17/22 1343  •  heparin (porcine) subcutaneous injection 5,000 Units, 5,000 Units, Subcutaneous, Q8H [facility], GAURANG Keith, 5,000 Units at 12/17/22 1317  •  insulin glargine (LANTUS) subcutaneous injection 12 Units 0 12 mL, 12 Units, Subcutaneous, HS, GAURANG Keith, 12 Units at 12/16/22 2107  •  insulin lispro (HumaLOG) 100 units/mL subcutaneous injection 1-5 Units, 1-5 Units, Subcutaneous, HS, GAURANG Keith, 1 Units at 12/15/22 2122  •  insulin lispro (HumaLOG) 100 units/mL subcutaneous injection 1-6 Units, 1-6 Units, Subcutaneous, TID AC, 3 Units at 12/17/22 1124 **AND** Fingerstick Glucose (POCT), , , TID AC, GAURANG Keith  •  insulin lispro (HumaLOG) 100 units/mL subcutaneous injection 4 Units, 4 Units, Subcutaneous, TID With Meals, GAURANG Green, 4 Units at 12/17/22 1126  •  iohexol (OMNIPAQUE) 240 MG/ML solution 50 mL, 50 mL, Oral, 90 min pre-procedure, Jesse Ernst MD  •  melatonin tablet 3 mg, 3 mg, Oral, HS, GAURANG Keith, 3 mg at 12/16/22 1957  •  methocarbamol (ROBAXIN) tablet 500 mg, 500 mg, Oral, BID, GAURANG Keith, 500 mg at 12/17/22 0851  •  multivitamin-minerals (CENTRUM) tablet 1 tablet, 1 tablet, Oral, Daily, GAURANG Keith, 1 tablet at 12/17/22 0851  •  ondansetron (ZOFRAN-ODT) dispersible tablet 4 mg, 4 mg, Oral, Q6H PRN, GAURANG Keith, 4 mg at 12/17/22 0851  •  oxyCODONE (ROXICODONE) immediate release tablet 10 mg, 10 mg, Oral, Q6H PRN, GAURANG Keith, 10 mg at 12/16/22 1631  •  oxyCODONE (ROXICODONE) IR tablet 5 mg, 5 mg, Oral, Q6H PRN, GAURANG Keith  •  pantoprazole (PROTONIX) EC tablet 40 mg, 40 mg, Oral, BID AC, GAURANG Keith, 40 mg at 12/17/22 5357  •  simethicone (MYLICON) chewable tablet 80 mg, 80 mg, Oral, 4x Daily (with meals and at bedtime), GAURANG Keith, 80 mg at 12/17/22 1127  •  tamsulosin (FLOMAX) capsule 0 4 mg, 0 4 mg, Oral, Daily With Dinner, GAURANG Yadav, 0 4 mg at 12/16/22 6116    Past Medical History:   Diagnosis Date   • Abdominal pain 03/12/2022   • Acute renal failure ([place] [address] )     [id number] resolved   • Cancer ([place] [address] )    • Diabetes mellitus ([facility] )    • Enteritis 08/23/2016   • Gastroparesis due to DM ([facility] ) 08/23/2016   • GERD (gastroesophageal reflux disease)    • Hernia, ventral 08/04/2016   • Hyperlipidemia    • Hypertension    • Morbid obesity ([facility] ) 04/17/2018   • Postoperative visit 03/02/2022   • SIRS (systemic inflammatory response syndrome) ([facility] ) 03/12/2022   • Snoring    • Stage 3a chronic kidney disease ([facility] ) 02/19/2022       Patient Active Problem List    Diagnosis Date Noted   • Malignant neoplasm of transverse colon ([facility] ) 03/01/2022   • Sepsis ([facility] ) 12/17/2022   • Severe protein-calorie malnutrition ([facility] ) 12/14/2022   • Acute kidney injury ([facility] ) 12/14/2022   • Flash pulmonary edema ([facility] ) 11/12/2022   • MR (mitral regurgitation) 11/12/2022   • Bacteremia 11/12/2022   • ESBL (extended spectrum beta-lactamase) producing bacteria infection 11/12/2022   • Acute respiratory failure with hypoxia ([facility] ) 11/12/2022   • Encephalopathy 11/09/2022   • Cervical radiculopathy 10/26/2022   • Other fatigue 06/15/2022   • Colostomy prolapse ([facility] ) 05/27/2022   • Colon cancer metastasized to liver ([facility] ) 03/12/2022   • 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 liver ([facility] ) 03/02/2022   • Iron deficiency anemia, unspecified 03/01/2022   • Thrombocytosis 02/21/2022   • Hypokalemia 02/19/2022   • Transaminitis 02/19/2022   • Type 2 diabetes mellitus with hyperlipidemia ([facility] ) 02/18/2022   • Colonic mass 02/18/2022   • Microcytic anemia 02/18/2022   • Left ureteral calculus 01/30/2020   • Incarcerated umbilical hernia 03/82/7350   • Testicular hypogonadism 06/19/2017   • Low testosterone 05/30/2017   • Type 2 diabetes mellitus without complication, with long-term current use of insulin ([place] [address] ) 08/23/2016   • Benign essential hypertension 08/23/2016   • Mixed hyperlipidemia 08/23/2016   • Erectile dysfunction 07/11/2016   • Obesity (BMI 30-39 9) 07/11/2016      Charli Call DO  Physical Medicine and [facility]    Total time spent:  45 minutes, with more than 50% spent counseling/coordinating care  Counseling includes discussion with patient re: progress in therapies, functional issues observed by therapy staff, and discussion with patient his/her current medical state/wellbeing  Coordination of patient's care was performed in conjunction with Internal Medicine service to monitor patient's labs, vitals, and management of their comorbidities

## 2022-12-17 NOTE — PROGRESS NOTES
12/17/22 1245   Assessment   Treatment Assessment Attempted twice to see pt today  Pt had low grade fever overnight and spike in WBC  Pt very fatigued and asking to not participate on both attempts  MD is aware  Medical workup onging  Therapy Time missed   Time missed?  Yes   Amount of time missed 90   Reason for time missed Extreme fatigue   Time(s) multiple attempts made two attempts made at [phone], pt requesting to not participate due to fatigue and not feeling well

## 2022-12-17 NOTE — CONSULTS
Consultation - Infectious Disease   Venkata Muñoz 61 y o  male MRN: [id number]  Unit/Bed#: -01 Encounter: [id number]      IMPRESSION & RECOMMENDATIONS:   Impression/Recommendations:  SIRS versus sepsis  WBC, HR  Consider persistent/recurrent intra-abdominal infection in setting of complex history below, recently completed antibiotics  ROS and exam otherwise negative  Recent Flu/RSV/COVID PCR, CXR negative  Rec:  · Restart Ertapenem  · Check blood cultures  · Check CT A/P   · Check CMP now and in AM  · Recheck CBC in AM  · Follow drain outputs closely  · Follow temperatures and hemodynamics closely  · Low threshold to transfer back to acute care if clinically deteriorates    Recent intra-abdominal abscess  In the setting complex surgical history as below   Initially developed 11/2022 companied by ESBL E  coli bacteremia  Status post exploratory laparotomy, right hemicolectomy, temporary abdominal closure 11/16; re-exploration, partial left colectomy, primary ileocolonic anastomosis with proximal diverting loop ileostomy, midline fascial closure on 11/17  More recently developed abscess in hepatectomy bed, collection +/- leak at area of prior colonic anastamosis, 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 via wound   Status post IR drain into perihepatic collection 12/8  Pericolonic area not accessed  Cultures with ESBL E  Coli  Status post 7 days ertapenem after drain placement through 12/15  Consider recurrence as above  Possible acute cholecystitis  Status post percutaneous cholecystostomy tube  Metastatic colon cancer  To liver, possible lung  Status post resection, chemotherapy, more recent hepatic resection and ablation, transverse colon resection  CKD  Baseline Cr 1 4    DM  Anemia  Status post multiple transfusions  The above impression and plan was discussed in detail with the patient, his brother Cory Stewart over the phone, and Dr Tung Wood      Antibiotics:  Off antibiotics #2    Thank you for this consultation  We will follow along with you  HISTORY OF PRESENT ILLNESS:  Reason for Consult: Leukocytosis    HPI: Jaguar Mackenzie is a 61 y o  male with metastatic colon cancer initially diagnosed in March 2022  He underwent resection with loop ileostomy at that time and received postoperative chemotherapy  He continued to have liver metastases and in early November underwent ex lap, partial hepatic resection and ablation, transverse colon resection, and reversal of ileostomy  His postoperative course was complicated by ESBL E  coli bacteremia due to left upper quadrant abscess attributed to anastomotic leak  On 11/16 he underwent exploratory laparotomy, right hemicolectomy, temporary abdominal closure, followed on 11/17 by re-exploration, partial left colectomy, primary ileocolonic anastomosis with proximal diverting loop ileostomy, midline fascial closure  He was readmitted approximately 2 weeks ago with ECF and recurrent intaabdominal abscess  He underwent IR guided drain placement  Cultures again grew ESBL E  coli  Is also a question of cholecystitis and he had a percutaneous cholecystostomy tube placed  He was treated with 7 days of antibiotics post drain placement which finished 12/15  Over the past 24 hours he has developed recurrent fevers and worsening leukocytosis  Other than feeling wiped out he denies any new symptoms  We are asked to comment on further evaluation and management  In performing this consult, I have reviewed prior admission and outpatient visit records in detail  REVIEW OF SYSTEMS:  As per HPI  A complete system-based review of systems is otherwise negative      PAST MEDICAL HISTORY:  Past Medical History:   Diagnosis Date   • Abdominal pain 03/12/2022   • Acute renal failure ([facility] )     [id number] resolved   • Cancer ([facility] )    • Diabetes mellitus ([facility] )    • Enteritis 08/23/2016   • Gastroparesis due to DM ([facility] ) 08/23/2016   • GERD (gastroesophageal reflux disease)    • Hernia, ventral 08/04/2016   • Hyperlipidemia    • Hypertension    • Morbid obesity ([facility] ) 04/17/2018   • Postoperative visit 03/02/2022   • SIRS (systemic inflammatory response syndrome) ([facility] ) 03/12/2022   • Snoring    • Stage 3a chronic kidney disease ([facility] ) 02/19/2022     Past Surgical History:   Procedure Laterality Date   • COLONOSCOPY     • COLOSTOMY N/A 02/20/2022    Procedure: COLOSTOMY LOOP, diverting;  Surgeon: Rachana Parrish MD;  Location: [facility];  Service: General   • ESOPHAGOGASTRODUODENOSCOPY N/A 08/24/2016    Procedure: ESOPHAGOGASTRODUODENOSCOPY (EGD); Surgeon: Jerad Garcia MD;  Location: [facility];   Service:    • EXPLORATORY LAPAROTOMY W/ BOWEL RESECTION N/A 11/16/2022    Procedure: LAPAROTOMY EXPLORATORY W/ BOWEL RESECTION- VAC PLACEMENT;  Surgeon: Chelsey Abdi MD;  Location: [facility];  Service: Surgical Oncology   • EXPLORATORY LAPAROTOMY W/ BOWEL RESECTION N/A 11/17/2022    Procedure: LAPAROTOMY EXPLORATORY W/ BOWEL RESECTION;  Surgeon: Chelsey Abdi MD;  [facility]: [facility];  Service: Surgical Oncology   • ILEOSTOMY N/A 11/17/2022    Procedure: ILEOSTOMY;  Surgeon: Chelsey Abdi MD;  [facility];  Service: Surgical Oncology   • ILEOSTOMY CLOSURE N/A 11/7/2022    Procedure: REVERSAL COLOSTOMY;  Surgeon: Wendy Ramirez MD;  [facility];  Service: Surgical Oncology   • IR CHOLECYSTOSTOMY TUBE CHECK/CHANGE/REPOSITION/REINSERTION/UPSIZE  12/12/2022   • IR CHOLECYSTOSTOMY TUBE PLACEMENT  12/8/2022   • IR DRAINAGE TUBE PLACEMENT  12/8/2022   • IR PORT PLACEMENT  03/10/2022   • KIDNEY STONE SURGERY     • LIVER BIOPSY LAPAROSCOPIC N/A 02/20/2022    Procedure: DIAGNOSTIC LAPAROSCOPIC LIVER BIOPSY, DIVERTING LOOP COLOSTOMY ;  Surgeon: Rachana Parrish MD;  [facility]: [facility];  Service: General   • LIVER LOBECTOMY N/A 11/7/2022    Procedure: SEGMENT 3 LIVER RESECTION, ABLATION, INTRAOPERATIVE U/S OF LIVER, PERITONEAL BIOPSY;  Surgeon: Wendy Ramirez MD; [facility]: [facility];  Service: Surgical Oncology   • RIGHT COLON RESECTION N/A 2022    Procedure: EX LAP, TRANVERSE COLON RESECTION;  Surgeon: Leah Yousif MD;  [facility];  Service: Surgical Oncology   • TONSILLECTOMY     • UMBILICAL HERNIA REPAIR LAPAROSCOPIC N/A 2019    Procedure: LAPAROSCOPIC UMBILICAL HERNIA REPAIR;  Surgeon: Akiko Nunes MD;  [facility]: [facility];  Service: General   • VAC DRESSING APPLICATION N/A     Procedure: APPLICATION VAC DRESSING ABDOMEN/TRUNK;  Surgeon: Nicholas Rodriguez MD;  [facility];  Service: Surgical Oncology       FAMILY HISTORY:  Non-contributory    SOCIAL HISTORY:  Social History     Substance and Sexual Activity   Alcohol Use Never     Social History     Substance and Sexual Activity   Drug Use No     Social History     Tobacco Use   Smoking Status Never   Smokeless Tobacco Never       ALLERGIES:  Allergies   Allergen Reactions   • Shellfish-Derived Products - Food Allergy Anaphylaxis   • Erythromycin GI Intolerance       MEDICATIONS:  All current active medications have been reviewed      PHYSICAL EXAM:  Vitals:  Temp:  [97 5 °F (36 4 °C)-100 3 °F (37 9 °C)] 97 9 °F (36 6 °C)  HR:  [] 100  Resp:  [16-22] 22  BP: (131-159)/(72-83) 154/72  SpO2:  [92 %-96 %] 96 %  Temp (24hrs), Av 3 °F (36 8 °C), Min:97 5 °F (36 4 °C), Max:100 3 °F (37 9 °C)  Current: Temperature: 97 9 °F (36 6 °C)     Physical Exam:  General:  Weak male, in no acute distress  Eyes:  Conjunctive clear with no hemorrhages or effusions  Oropharynx:  No ulcers, no lesions  Neck:  Supple, no lymphadenopathy  Lungs:  Normal respiratory excursion, no accessory muscle use  Cardiac:  Regular rate and rhythm, extremities well perfused  Abdomen:  Soft, non-tender, midline wound clean with granular tissue, radha tube with bloody drainage, LUQ drain with purulent, nonfeculant drainage  Extremities:  No peripheral cyanosis, clubbing; generalized edema  Skin:  No rashes, no ulcers  Neurological:  Moves all four extremities spontaneously, sensation grossly intact    LABS, IMAGING, & OTHER STUDIES:  Lab Results:  I have personally reviewed pertinent labs  Results from last 7 days   Lab Units 12/16/22  1136 12/15/22  0516 12/14/22  0522 12/13/22  0523 12/12/22  0509 12/11/22  0648 12/10/22  1232   POTASSIUM mmol/L  --  3 9 3 7 3 9   < > 3 6 3 9   CHLORIDE mmol/L  --  102 104 105   < > 106 107   CO2 mmol/L  --  26 27 24   < > 21 20*   BUN mg/dL  --  15 16 18   < > 26* 27*   CREATININE mg/dL  --  1 38* 1 43* 1 62*   < > 2 10* 1 94*   EGFR ml/min/1 73sq m  --  55 53 46   < > 33 37   CALCIUM mg/dL  --  8 4 8 8 8 8   < > 8 0* 7 8*   AST U/L 90*  --   --   --   --  96* 122*   ALT U/L 19  --   --   --   --  32 37   ALK PHOS U/L 1,349*  --   --   --   --  994* 961*    < > = values in this interval not displayed  Results from last 7 days   Lab Units 12/17/22  0632 12/16/22  0443 12/15/22  0516   WBC Thousand/uL 18 24* 10 52* 11 23*   HEMOGLOBIN g/dL 9 3* 8 0* 7 3*   PLATELETS Thousands/uL 294 300 321           Imaging Studies:   I have personally reviewed pertinent imaging study reports and images in PACS  CT A/P 12/8 reviewed personally collection in hepatectomy bed, also collection along prior colonic suture line    EKG, Pathology, and Other Studies:   I have personally reviewed pertinent reports

## 2022-12-17 NOTE — ASSESSMENT & PLAN NOTE
· SIRS versus sepsis at this time given his vitals, leukocytosis, and complicated infection history  · Mgmt per ID, IM  · 12/20-IR perisplenic, perigastric drain placement for 2 additional abscesses-  · Perigastric abscess +E  Coli ESBL  · Blood cultures negative

## 2022-12-17 NOTE — PLAN OF CARE
Problem: PAIN - ADULT  Goal: Verbalizes/displays adequate comfort level or baseline comfort level  Description: Interventions:  - Encourage patient to monitor pain and request assistance  - Assess pain using appropriate pain scale  - Administer analgesics based on type and severity of pain and evaluate response  - Implement non-pharmacological measures as appropriate and evaluate response  - Consider cultural and social influences on pain and pain management  - Notify physician/advanced practitioner if interventions unsuccessful or patient reports new pain  Outcome: Progressing     Problem: INFECTION - ADULT  Goal: Absence or prevention of progression during hospitalization  Description: INTERVENTIONS:  - Assess and monitor for signs and symptoms of infection  - Monitor lab/diagnostic results  - Monitor all insertion sites, i e  indwelling lines, tubes, and drains  - Monitor endotracheal if appropriate and nasal secretions for changes in amount and color  - [place] appropriate cooling/warming therapies per order  - Administer medications as ordered  - Instruct and encourage patient and family to use good hand hygiene technique  - Identify and instruct in appropriate isolation precautions for identified infection/condition  Outcome: Progressing

## 2022-12-17 NOTE — NURSING NOTE
Patient refused to participate with physical therapy this morning  He verbalized he felt wiped out  Patient skin color pale and warm to touch  DR Sukhwinder Dickson made aware of patients refusal and recent lab results from this morning  Patient tolerated the medications whole  He verbalized he felt nauseous  PRN Zofran given x 1, no emesis noted

## 2022-12-17 NOTE — CONSULTS
Consult Service: Gastroenterology      PATIENT INFORMATION      Ophelia Robles 61 y o  male MRN: [id number]  Unit/Bed#: [facility] 965-42 Encounter: [id number]  PCP: Eduin Moore MD  Date of Admission:  12/14/2022  Date of Consultation: 12/17/22  Requesting Physician: Mari Nazario, DO       ASSESSMENTS & PLAN   Ophelia Robles is a 61 y o  old male with PMH of metastatic colon cancer to the liver for which he underwent an ex lap with bowel resection and ileostomy, partial hepatectomy complicated by intra-abdominal abscess, patient also with acute cholecystitis status post percutaneous cholecystostomy tube placement    Gastroenterology has been consulted for assistance with management of elevated alkaline phosphatase    Elevated alkaline phosphatase  · Suspect secondary to metastatic liver lesions, possible choledocholithiasis, bone metastasis? · We will fractionate alkaline phosphatase to see if it is coming from the liver or bone  · We will also check a GGT and follow-up on the repeat CT scan with contrast that was ordered by infectious disease  · If work-up is nonrevealing will consider MRI for further evaluation    Colon cancer with metastatic disease  · Patient underwent bowel resection and has a diverting ileostomy  · He also underwent partial hepatectomy  · Currently complicated by intra-abdominal abscess for which he is on ertapenem  · That is post IR drainage of abscess    Acute cholecystitis  · Status post percutaneous cholecystostomy tube placement  · Bloody output noted from cholecystostomy tube    HISTORY OF PRESENT ILLNESS      Ophelia Robles is a 61 y o  male who is originally admitted for wound infection and is being consulted for elevated alkaline phosphatase  Patient currently reports that he does not have any symptoms today  He feels like he is very lethargic  Patient denies abdominal pain, nausea, vomiting, heartburn, dysphagia, constipation, diarrhea, blood in stools    He reports that he ate a full breakfast this morning without any issues  REVIEW OF SYSTEMS     A thorough 12-point review of systems has been conducted  Pertinent positives and negatives are mentioned in the history of present illness  PAST MEDICAL & SURGICAL HISTORY      Past Medical History:   Diagnosis Date   • Abdominal pain 03/12/2022   • Acute renal failure ([place] [address] )     [id number] resolved   • Cancer ([place] [address] )    • Diabetes mellitus ([place] [address] )    • Enteritis 08/23/2016   • Gastroparesis due to DM ([place] [address] ) 08/23/2016   • GERD (gastroesophageal reflux disease)    • Hernia, ventral 08/04/2016   • Hyperlipidemia    • Hypertension    • Morbid obesity ([place] [address] ) 04/17/2018   • Postoperative visit 03/02/2022   • SIRS (systemic inflammatory response syndrome) ([place] [address] ) 03/12/2022   • Snoring    • Stage 3a chronic kidney disease ([place] [address] ) 02/19/2022       Past Surgical History:   Procedure Laterality Date   • COLONOSCOPY     • COLOSTOMY N/A 02/20/2022    Procedure: COLOSTOMY LOOP, diverting;  Surgeon: Namita Lara MD;  [facility]: [facility];  Service: General   • ESOPHAGOGASTRODUODENOSCOPY N/A 08/24/2016    Procedure: ESOPHAGOGASTRODUODENOSCOPY (EGD); Surgeon: Melvin Faulkner MD;  Location: [facility];   Service:    • EXPLORATORY LAPAROTOMY W/ BOWEL RESECTION N/A 11/16/2022    Procedure: LAPAROTOMY EXPLORATORY W/ BOWEL RESECTION- VAC PLACEMENT;  Surgeon: Nadine Bains MD;  Location: [facility];  Service: Surgical Oncology   • EXPLORATORY LAPAROTOMY W/ BOWEL RESECTION N/A 11/17/2022    Procedure: LAPAROTOMY EXPLORATORY W/ BOWEL RESECTION;  Surgeon: Nadine Bains MD;  [facility];  Service: Surgical Oncology   • ILEOSTOMY N/A 11/17/2022    Procedure: Lyndsay Marcelino;  Surgeon: Nadine Bains MD;  [facility];  Service: Surgical Oncology   • ILEOSTOMY CLOSURE N/A 11/7/2022    Procedure: REVERSAL COLOSTOMY;  Surgeon: Suresh West MD;  [facility];  Service: Surgical Oncology   • IR CHOLECYSTOSTOMY TUBE CHECK/CHANGE/REPOSITION/REINSERTION/UPSIZE  12/12/2022   • IR CHOLECYSTOSTOMY TUBE PLACEMENT  12/8/2022   • IR DRAINAGE TUBE PLACEMENT  12/8/2022   • IR PORT PLACEMENT  03/10/2022   • KIDNEY STONE SURGERY     • LIVER BIOPSY LAPAROSCOPIC N/A 02/20/2022    Procedure: DIAGNOSTIC LAPAROSCOPIC LIVER BIOPSY, DIVERTING LOOP COLOSTOMY ;  Surgeon: Akiko Nunes MD;  [facility];  Service: General   • LIVER LOBECTOMY N/A 11/7/2022    Procedure: SEGMENT 3 LIVER RESECTION, ABLATION, INTRAOPERATIVE U/S OF LIVER, PERITONEAL BIOPSY;  Surgeon: Leah Yousif MD;  [facility];  Service: Surgical Oncology   • RIGHT COLON RESECTION N/A 11/7/2022    Procedure: EX LAP, TRANVERSE COLON RESECTION;  Surgeon: Leah Yousif MD;  [facility];  Service: Surgical Oncology   • TONSILLECTOMY     • UMBILICAL HERNIA REPAIR LAPAROSCOPIC N/A 04/26/2019    Procedure: LAPAROSCOPIC UMBILICAL HERNIA REPAIR;  Surgeon: Akiko Nunes MD;  [facility];  Service: General   • VAC DRESSING APPLICATION N/A 19/14/4933    Procedure: APPLICATION VAC DRESSING ABDOMEN/TRUNK;  Surgeon: Nicholas Rodriguez MD;  [facility];  Service: Surgical Oncology         MEDICATIONS & ALLERGIES       Medications:   Prior to Admission medications    Medication Sig Start Date End Date Taking? Authorizing Provider   acetaminophen (TYLENOL) 325 mg tablet Take 3 tablets (975 mg total) by mouth every 8 (eight) hours for 7 days 12/14/22 12/21/22  Pedro Lacy MD   amLODIPine (NORVASC) 5 mg tablet TAKE 1 TABLET BY MOUTH TWICE A DAY 6/30/22   George Jung MD   aspirin 81 MG tablet Take 81 mg by mouth daily      Historical Provider, MD   atorvastatin (LIPITOR) 40 mg tablet TAKE 1 TABLET BY MOUTH EVERY DAY 6/30/22   George Jung MD   calcium carbonate (TUMS) 500 mg chewable tablet Chew 1 tablet (500 mg total) 2 (two) times a day as needed for indigestion or heartburn for up to 7 days 12/14/22 12/21/22  Pedro Lacy MD   Cholecalciferol (VITAMIN D3 PO) Take 400 Units by mouth daily    Historical Provider, MD   Continuous Blood Gluc Sensor (FreeStyle Parrish 14 Day Sensor) MISC Use 1 each every 14 (fourteen) days 3/11/22   GAURANG Estrada   ertapenem 1,000 mg in sodium chloride 0 9 % 50 mL IVPB Inject 1,000 mg into a catheter in a vein every 24 hours for 1 day Do not start before December 15, 2022  12/15/22 12/16/22  Alex Meek MD   insulin glargine (LANTUS) 100 units/mL subcutaneous injection Inject 12 Units under the skin daily at bedtime 12/14/22   Alex Meek MD   insulin lispro (HumaLOG) 100 units/mL injection Inject 3 Units under the skin 3 (three) times a day with meals 12/14/22   Alex Meek MD   Insulin Pen Needle (B-D UF III MINI PEN NEEDLES) 31G X 5 MM MISC Inject under the skin daily 5/13/22   Raymond Frost MD   methocarbamol (ROBAXIN) 500 mg tablet Take 1 tablet (500 mg total) by mouth 2 (two) times a day 10/26/22   Raymond Frost MD   Multiple Vitamins-Minerals (multivitamin with minerals) tablet Take 1 tablet by mouth daily    Historical Provider, MD Barrera, Disp, (HYPODERMIC NEEDLE) 23G X 1-1/2" MISC by Does not apply route once a week 5/10/18   Nataliya Cai PA-C   ondansetron (ZOFRAN) 4 mg tablet Take 4 mg by mouth every 6 (six) hours as needed 1/10/22   Historical Provider, MD   Ostomy Supplies MISC Use in the morning 2/23/22   Raymond Frost MD   oxyCODONE (ROXICODONE) 10 MG TABS Take 1 tablet (10 mg total) by mouth every 6 (six) hours as needed (severe pain) for up to 7 days Max Daily Amount: 40 mg 12/14/22 12/21/22  Alex Meek MD   oxyCODONE (ROXICODONE) 5 immediate release tablet Take 1 tablet (5 mg total) by mouth every 6 (six) hours as needed (moderate pain) for up to 7 days Max Daily Amount: 20 mg 12/14/22 12/21/22  Alex Meek MD   pantoprazole (PROTONIX) 40 mg tablet TAKE 1 TABLET BY MOUTH TWICE A DAY 5/8/22   Raymond Frost MD   sodium chloride, PF, 0 9 % 10 mL by Intracatheter route daily 12/9/22   Jamshid Prieto PA-C   tamsulosin [facility]) 0 4 mg Take 1 capsule (0 4 mg total) by mouth daily with dinner 12/14/22   Leyla Calderon MD       Allergies: Allergies   Allergen Reactions   • Shellfish-Derived Products - Food Allergy Anaphylaxis   • Erythromycin GI Intolerance         SOCIAL HISTORY      Marital Status: /Civil Union    Substance Use History:   Social History     Substance and Sexual Activity   Alcohol Use Never     Social History     Tobacco Use   Smoking Status Never   Smokeless Tobacco Never     Social History     Substance and Sexual Activity   Drug Use No         FAMILY HISTORY      Non-Contributory      PHYSICAL EXAM     Vitals:   Blood Pressure: 148/70 (12/17/22 1359)  Pulse: 90 (12/17/22 1359)  Temperature: 98 9 °F (37 2 °C) (12/17/22 1359)  Temp Source: Oral (12/17/22 1359)  Respirations: 16 (12/17/22 1359)  Height: 5' 10" (177 8 cm) (12/14/22 1527)  Weight - Scale: 104 kg (230 lb) (12/14/22 1527)  SpO2: 97 % (12/17/22 1359)    Physical Exam:   GENERAL: NAD  HEENT:  NC/AT, MMM  CARDIAC:  RRR, +S1/S2, no S3/S4 heard  PULMONARY:  CTA B/L, no wheezing/rales/rhonci, non-labored breathing  ABDOMEN: Bloody output noted from cholecystostomy tube, brown stools in ileostomy  DIGITAL RECTAL EXAM: not indicated   NEUROLOGIC:  Alert/oriented x3     SKIN:  No rashes or erythema       ADDITIONAL DATA     Lab Results:     Results from last 7 days   Lab Units 12/17/22  0632   WBC Thousand/uL 18 24*   HEMOGLOBIN g/dL 9 3*   HEMATOCRIT % 29 2*   PLATELETS Thousands/uL 294   NEUTROS PCT % 81*   LYMPHS PCT % 8*   MONOS PCT % 10   EOS PCT % 0     Results from last 7 days   Lab Units 12/17/22  1300   POTASSIUM mmol/L 3 7   CHLORIDE mmol/L 104   CO2 mmol/L 25   BUN mg/dL 14   CREATININE mg/dL 1 37*   CALCIUM mg/dL 8 4   ALK PHOS U/L 1,185*   ALT U/L 19   AST U/L 86*           Imaging:    XR chest portable    Result Date: 12/14/2022  Narrative: CHEST INDICATION:   Chest pain 5/10, mid sternal, does not radiate  COMPARISON:  12/8/2022 CT EXAM PERFORMED/VIEWS:  XR CHEST PORTABLE Images: 3 FINDINGS: Progressive bilateral opacities suspicious for CHF and small left pleural effusion Right IJ port terminates at cavoatrial junction, unchanged Cardiomediastinal silhouette appears unremarkable  No pneumothorax Osseous structures appear within normal limits for patient age  Impression: Progressive bilateral opacities suspicious for CHF and small left pleural effusion Workstation performed: [id number]     XR chest portable    Result Date: 12/7/2022  Narrative: CHEST INDICATION:   dyspnea on minimal exertion; to r/o atelectasis  COMPARISON:  None EXAM PERFORMED/VIEWS:  XR CHEST PORTABLE FINDINGS:  Right chest port catheter tip in the cavoatrial region  Cardiomediastinal silhouette appears unremarkable  No pneumothorax  Haziness obscuring hemidiaphragm, cardiac silhouette and costophrenic angle  The lungs are otherwise clear  Osseous structures appear within normal limits for patient age  Impression: Mild to moderate left effusion  Left lung base parenchymal process cannot be excluded  Workstation performed: [id number]     XR chest portable    Result Date: 11/18/2022  Narrative: CHEST INDICATION:   hypoxia  COMPARISON:  Chest radiograph and CT chest November 16, 2022  EXAM PERFORMED/VIEWS:  XR CHEST PORTABLE FINDINGS:  Endotracheal tube in satisfactory position 4 cm above the jeremiah  Venous infusion device terminates in region of cavoatrial junction  Nasogastric tube tip and sidehole terminates below the diaphragm  Cardiomediastinal silhouette appears unremarkable  There is increased left pleural effusion and associated consolidation in the left lung base consistent with a large left pleural effusion  Patchy opacity medial right lung base likely represents atelectasis improved since November 16, 2022  Osseous structures appear within normal limits for patient age       Impression: Increasing left pleural effusion with associated atelectasis/consolidation  Lines and tubes in satisfactory position  Findings marked for immediate notification in Epic  Workstation performed: [id number]     CT head wo contrast    Result Date: 11/29/2022  Narrative: CT BRAIN - WITHOUT CONTRAST INDICATION:   Delirium Mental status change, persistent or worsening Increased delirium, AMS  R/O METs  COMPARISON:  MRI dated 11/10/2022  TECHNIQUE:  CT examination of the brain was performed  In addition to axial images, sagittal and coronal 2D reformatted images were created and submitted for interpretation  Radiation dose length product (DLP) for this visit:  901 5 mGy-cm   This examination, like all CT scans performed in the [facility], was performed utilizing techniques to minimize radiation dose exposure, including the use of iterative reconstruction and automated exposure control  IMAGE QUALITY:  Diagnostic  FINDINGS: PARENCHYMA:  No intracranial mass, mass effect or midline shift  No CT signs of acute infarction  No acute parenchymal hemorrhage  VENTRICLES AND EXTRA-AXIAL SPACES:  Normal for the patient's age  VISUALIZED ORBITS AND PARANASAL SINUSES:  Orbits are unremarkable  Small mucous retention cyst in the left maxillary sinus  Stable partially imaged benign-appearing lucent lesion in the left maxilla protruding into the floor of the left maxillary sinus  There is an associated unerupted tooth which favors dentigerous cyst  CALVARIUM AND EXTRACRANIAL SOFT TISSUES:  Normal      Impression: No acute intracranial abnormality  Workstation performed: [id number]     CT chest abdomen pelvis w contrast    Result Date: 12/8/2022  Narrative: CT CHEST, ABDOMEN AND PELVIS WITH IV CONTRAST INDICATION:   Evaluation for EC fistula, evaluation for pneumonia, pleural effusions    COMPARISON:  CT chest/abdomen/pelvis dated 11/21/2022  TECHNIQUE: CT examination of the chest, abdomen and pelvis was performed   Axial, sagittal, and coronal 2D reformatted images were created from the source data and submitted for interpretation  Radiation dose length product (DLP) for this visit:  1475 19 mGy-cm   This examination, like all CT scans performed in the [facility], was performed utilizing techniques to minimize radiation dose exposure, including the use of iterative reconstruction and automated exposure control  IV Contrast:  100 mL of iohexol (OMNIPAQUE) Enteric Contrast: Enteric contrast was not administered  FINDINGS: CHEST LUNGS:  There is bilateral lower lobe airspace consolidation with associated air bronchograms consistent with pneumonia  There is no tracheal or endobronchial lesion  PLEURA:  Small bilateral pleural effusions  HEART/GREAT VESSELS: Heart is unremarkable for patient's age  No thoracic aortic aneurysm  MEDIASTINUM AND NITA:  Unremarkable  CHEST WALL AND LOWER NECK:  Unremarkable  ABDOMEN LIVER/BILIARY TREE:  Again demonstrated are multiple low-attenuation lesions throughout the liver consistent with known metastatic disease, similar to multiple priors  Similar capsular retraction along the inferior right hepatic lobe  Postsurgical changes of prior left segment 3 hepatectomy  There is a mildly enlarged fluid collection adjacent to the hepatectomy site now measuring 8 1 x 7 1 x 8 0 cm, previously 6 8 x 6 6 x 8 2 cm when remeasured in a similar fashion  Volume of air within the collection is also mildly increased  GALLBLADDER:  No calcified gallstones  There is new significant gallbladder distention now measuring up to 13 x 7 cm with associated gallbladder wall thickening/edema  There is a new focal fluid collection contiguous with the gallbladder fossa measuring 6 8 x 5 2 cm  SPLEEN:  Unremarkable  PANCREAS:  Unremarkable  ADRENAL GLANDS:  Unremarkable  KIDNEYS/URETERS:  Unremarkable  No hydronephrosis  STOMACH AND BOWEL:  No evidence for bowel obstruction   The patient is again noted to be status post partial colectomy with diverging loop ileostomy  APPENDIX:  No findings to suggest appendicitis  ABDOMINOPELVIC CAVITY: Previously visualized left-sided drainage catheter has been removed in the interim  There is new small extraluminal paracolic gas with associated complex fluid which appears contiguous with the colonic suture line, best seen on axial images 60-73, series 2 worrisome for postoperative leak  Mild rim-enhancing focal gas and fluid collection in close proximity measuring 4 4 x 2 4 cm (image 67, series 2) is suggestive of developing abscess  Small volume abdominopelvic ascites and diffuse mesenteric edema is similar to prior  No lymphadenopathy  VESSELS:  Unremarkable for patient's age  PELVIS REPRODUCTIVE ORGANS:  Unremarkable for patient's age  URINARY BLADDER:  Unremarkable  ABDOMINAL WALL/INGUINAL REGIONS:  There are new small air locules within the midline abdominal wound which appear to track to the anterior surface of bowel, best seen on sagittal series 602, series 105 worrisome for enterocutaneous fistula  Mild body wall edema  OSSEOUS STRUCTURES:  No acute fracture or destructive osseous lesion  Impression: New findings of small left-sided extraluminal paracolic gas with small associated complex fluid which in close proximity to the colonic suture line; postoperative leak not excluded  Mild rim-enhancing left abdominal focal gas and fluid collection in close proximity measuring 4 4 x 2 4 cm suggestive of developing abscess  Smaller 2 7 x 2 0 cm gas and fluid-containing rim-enhancing fluid collection in the left anterior abdomen (image 66, series 2) also suggestive of abscess  New significant gallbladder distention now measuring up to 13 x 7 cm with associated gallbladder wall thickening/edema  New focal fluid collection contiguous with the gallbladder fossa measuring 6 8 x 5 2 cm   Mildly enlarged fluid collection adjacent to the segment 3 hepatectomy site now measuring 8 1 x 7 1 x 8 0 cm, previously 6 8 x 6 6 x 8 2 cm  Volume of air within the collection is also increased  New small air locules within the midline abdominal wound which appear to track to the anterior surface of bowel worrisome for enterocutaneous fistula  Small volume abdominopelvic ascites and diffuse mesenteric edema, similar to prior  Bilateral lower lobe pneumonia  Small bilateral pleural effusions  The study was marked in [facility] for immediate notification  Workstation performed: [id number]     IR cholecystostomy tube placement    Result Date: 12/9/2022  Narrative: PROCEDURE: Ultrasound-guided percutaneous cholecystostomy catheter placement STAFF: GEM Lund Or: Multiple  COMPLICATIONS: None  MEDICATIONS: 1% lidocaine subcutaneous  Antibiotics: The patient was already receiving antibiotics, with a current dose  INDICATION: Cholecystitis  PROCEDURE: Using ultrasound guidance, a needle was inserted into the gallbladder using a short transhepatic window  Next, a wire was advanced and coiled in the gallbladder  A 10 [place] locking loop drainage catheter was then placed into the gallbladder and secured  using sutures  100 mL of bile was aspirated  The catheter was attached to gravity drainage  FINDINGS: 1  Initial ultrasound showed findings consistent with acute cholecystitis  2   Interval ultrasound showed good positioning of the needle, wire, and catheter within the gallbladder  Impression: Percutaneous cholecystostomy  Workstation performed: [id number]     XR chest portable ICU    Result Date: 11/21/2022  Narrative: CHEST INDICATION:   high PEEP requirement  COMPARISON:  11/19/2022 EXAM PERFORMED/VIEWS:  XR CHEST PORTABLE ICU FINDINGS: Endotracheal tube approximately 3 7 cm above the jeremiah  Enteric tube tip at the proximal stomach, sidehole in the region of the GE junction  Right chest wall port at the cavoatrial junction  Low lung volumes   Central and basilar interstitial changes most compatible with edema, mid left lung linear atelectasis and small to moderate bilateral pleural effusions  Left effusion appears decreased and right increased  No pneumothorax  Heart, mediastinal and hilar structures are within normal limits  No acute osseous or soft tissue pathology  Impression: Evidence of edema and pleural effusions  Low lung volumes with atelectasis  Support tubes and lines as above  Workstation performed: [id number]     XR chest portable ICU    Result Date: 11/19/2022  Narrative: CHEST INDICATION:   f/u effusion  COMPARISON:  11/18/2022 6:31 PM EXAM PERFORMED/VIEWS:  XR CHEST PORTABLE ICU  7:44 AM FINDINGS:  Endotracheal tube in satisfactory position 3 cm above the jeremiah  Orogastric tube extends below the diaphragm but the tip is not imaged  Right chest wall implantable port enters the internal jugular vein and has its tip at the cavoatrial junction  The heart is mildly enlarged  Mediastinum appears unremarkable  Continued increase opacification left hemithorax identified consistent with small-moderate effusion and likely underlying atelectasis and/or pneumonia  Mild subsegmental atelectasis in the medial mid right lung again noted  No pneumothorax is appreciated  Osseous structures appear within normal limits for patient age  Impression: 1  Opacification left mid lower lung zones due to small-moderate pleural effusion and underlying atelectasis and/or pneumonia  Mild subsegmental atelectasis right midlung  2   Lines and tubes as noted  Workstation performed: [id number]     CT pe study w abdomen pelvis w contrast    Result Date: 11/22/2022  Narrative: CT PULMONARY ANGIOGRAM OF THE CHEST AND CT ABDOMEN AND PELVIS WITH INTRAVENOUS CONTRAST INDICATION:   Persistent fevers, rule out abscess, PE  COMPARISON:  None  TECHNIQUE:  CT examination of the chest, abdomen and pelvis was performed    Thin section CT angiographic technique was used in the chest in order to evaluate for pulmonary embolus and coronal 3D MIP postprocessing was performed on the acquisition scanner  Axial, sagittal, and coronal 2D reformatted images were created from the source data and submitted for interpretation  Radiation dose length product (DLP) for this visit:  2219 mGy-cm   This examination, like all CT scans performed in the [facility], was performed utilizing techniques to minimize radiation dose exposure, including the use of iterative reconstruction and automated exposure control  IV Contrast:  98 mL of iohexol (OMNIPAQUE) 50 mL of iohexol (OMNIPAQUE) Enteric Contrast:  Enteric contrast was not administered  FINDINGS: CHEST PULMONARY ARTERIAL TREE:  No pulmonary embolus is seen  LUNGS:  Dependent lower lobe atelectasis is present  There is no tracheal or endobronchial lesion  PLEURA:  Moderate right and small left pleural effusions are present  HEART/AORTA:  Heart is unremarkable for patient's age  No thoracic aortic aneurysm  MEDIASTINUM AND NITA:  Endotracheal tube is present in appropriate position  CHEST WALL AND LOWER NECK:  Right chest wall port catheter is present  ABDOMEN LIVER: Stable hepatic metastatic disease is again identified with associated capsular retraction  Postoperative changes involving the left lobe liver are again identified  6 8 x 5 8 cm postoperative fluid collection is identified with decreasing postoperative gas noted  GALLBLADDER:  No calcified gallstones  No pericholecystic inflammatory change  Excreted contrast is identified within the bladder  SPLEEN:  Unremarkable  PANCREAS:  Unremarkable  ADRENAL GLANDS:  Unremarkable  KIDNEYS/URETERS:  One or more simple renal cyst(s) is noted  Otherwise unremarkable kidneys  No hydronephrosis  STOMACH AND BOWEL:  Nasogastric tube tip lies within the stomach  Patient is again status post partial colectomy  Interval decrease in bowel distention noted  APPENDIX:  No findings to suggest appendicitis   ABDOMINOPELVIC CAVITY:  Interval evacuation of previously identified left upper quadrant hematoma is identified  Postoperative drainage catheter is in place  Previously noted intra-abdominal fluid collections are resolved  Small volume of ascites is present without conclusive evidence of organized collection identified  Small amount of postoperative free air is identified  Drainage catheter is seen extending along the anterior aspect of the left abdominal wall as well  No significant collections are identified about the drainage catheters  Postoperative changes involving the mesentery noted  VESSELS:  Unremarkable for patient's age  PELVIS REPRODUCTIVE ORGANS:  Unremarkable for patient's age  URINARY BLADDER:  Negro catheter is present  ABDOMINAL WALL/INGUINAL REGIONS:  Unremarkable  OSSEOUS STRUCTURES:  No acute fracture or destructive osseous lesion  Impression: No evidence of pulmonary embolus  Moderate right and small left pleural effusions with associated dependent lower lobe atelectasis  Interval evacuation of previously identified intra-abdominal hematoma and previously noted extraluminal collection with placement of drainage catheters  No evidence of residual organized collection identified  Small volume of ascites present  Stable appearance of hepatic metastatic disease as well as postoperative changes involving the liver  Workstation performed: [id number]     US right upper quadrant    Result Date: 12/8/2022  Narrative: RIGHT UPPER QUADRANT ULTRASOUND INDICATION:     Dilated gallbladder on CT with wall thickening  COMPARISON:  CT chest abdomen pelvis from earlier today TECHNIQUE:   Real-time ultrasound of the right upper quadrant was performed with a curvilinear transducer with both volumetric sweeps and still imaging techniques  FINDINGS: PANCREAS:  Visualized portions of the pancreas are within normal limits  AORTA AND IVC:  Visualized portions are normal for patient age  LIVER: Size:  Within normal range    The liver measures 19 2 cm in the midclavicular line  Contour:  Lobulated  Parenchyma:  Heterogeneous  Known metastases are better visualized on the comparison CT  Limited imaging of the main portal vein shows it to be patent and hepatopetal   BILIARY: Gallbladder distention and mild wall thickening with pericholecystic fluid  Gallbladder sludge without calculi  Negative sonographic Campbell's sign  No intrahepatic biliary dilatation  CBD measures 5 0 mm  No choledocholithiasis  KIDNEY: Right kidney measures 10 7 x 5 0 x 5 5 cm  Volume 153 0 mL Kidney within normal limits  ASCITES:   Small quantity of abdominal ascites better visualized on the CT  Impression: Gallbladder wall thickening possibly reactive to the adjacent hepatic disease  Gallbladder pericholecystic fluid is nonspecific in the setting of abdominal ascites  Gallbladder distention is also nonspecific if the patient has not eaten  Negative sonographic Campbell's sign  Workstation performed: [id number]     IR drainage tube placement    Result Date: 12/9/2022  Narrative: PROCEDURE: Ultrasound-guided abscess drainage and catheter placement STAFF: GEM Qiu  CONTRAST: None  NUMBER OF IMAGES: Multiple  COMPLICATIONS: None  MEDICATIONS: 1% lidocaine subcutaneous  Antibiotics: The patient was already receiving antibiotics, with a current dose  INDICATION: Recent segment 3 hepatectomy complicated by abscess  PROCEDURE: Using ultrasound guidance, a needle was inserted into the collection  A wire was advanced and coiled in the collection  Following tract dilation, a 10 Chinese drainage catheter was advanced and secured using sutures  100 mL of pus was removed and sent for  culture and sensitivity  The catheter was attached to gravity drainage  FINDINGS: 1  Initial ultrasound redemonstrated the isoechoic collection within the hepatectomy bed  2   Interval ultrasound showed good positioning of the needle, wire, and catheter within the collection       Impression: Ultrasound-guided abscess drainage catheter placement  Workstation performed: [id number]     US bedside procedure    Result Date: 11/21/2022  Narrative: 1 2 840 [id number] 2 323 [id number] 11    IR cholecystostomy tube check/change/reposition/reinsertion/upsize    Result Date: 12/13/2022  Narrative: Cholecystostomy tube check and change Clinical History: Acute cholecystitis  24 hours has had blood and clots in bag  Contrast: 20 mL of Omnipaque  Fluoro time: 2 7 minutes Number of Images: 6 Conscious sedation time: N/A Technique: The patient was brought to the interventional radiology suite and placed supine on the table  The existing percutaneous catheter was prepped and draped in the usual sterile fashion  After a  view was obtained, contrast was injected into the catheter and multiple images were obtained  Findings: Cholecystostomy tube has been retracted slightly back or from body of the gallbladder  Intervention: The existing catheter was transected and removed over a heavy-duty wire  A new 10 Urdu therapeutic cholecystostomy catheter was inserted over the wire, and the loop formed and locked  The catheter was sutured in place and connected to a gravity bag  Impression: Impression: Slightly retracted cholecystostomy tube, tube exchange over the wire into better position within the body of the gallbladder  Signed, performed, and dictated by GAURANG Ochoa under the supervision of Dr Asha Angel  Workstation performed: [id number]       EKG, Pathology, and Other Studies Reviewed on Admission:   · EKG: Reviewed      Counseling / Coordination of Care Time: 30 total mins spent n consult  Greater than 50% of total time spent on patient counseling and coordination of care  Eliud Fonseca MD   PGY-5,   Gastroenterology         ** Please Note: This note is constructed using a voice recognition dictation system   **

## 2022-12-17 NOTE — QUICK NOTE
Attempted to call Wife to provide medical updates, but unable to reach   Will reach out again later after CT A/P    Perla Cameron DO  Physical Medicine and [facility]

## 2022-12-17 NOTE — PROGRESS NOTES
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  • s/p hepatic resection and reversal of colostomy on 11/7  • Return to the OR 11/16 for right hemicolectomy with partial descending colectomy colocolonic anastomosis with loop ileostomy and mid line abdominal VAC placement  • Possible fistula formation  • Continue local wound care; WC following  • 12/15:  Ileostomy = 500ml, radha tube = 40ml, VICTORINO drain = 55ml; U/O = 650     Acute cholecystitis  • s/p percutaneous tube placement 12/8 with tube check on 12/12  • Currently draining bloody discharge  • IR evaluated 12/14 states perc radha tube drainage may remain bloody for several days  • No further abdominal pain  • LFTs with increased alk phos and AST and GI consulted      Bacteremia/abdominal abscess  • ID now following due to worsening leukocytosis  • Ertapenem resumed  Work-up ordered    • VICTORINO drain currently with thick milky drainage    Hypertension  • On Norvasc 5mg BID  • stable     Diabetes mellitus  • Continue Lantus 12U qhs/Lispro 4U TID  • Continue diabetic diet and QID Accuchecks with SSI     Acute on chronic renal failure  • Stage III; baseline 1 4  • Lisinopril currently on hold  • Stable today at 1 37     Anemia  • Multifactorial due to recent infection, surgery, CKD stage III  • Transfused on 12/15 for hemoglobin 7 3 and 12/16/22 for hgb of 8   • Hgb currently 9 3      Atypical chest pain  • With elevation in troponin/EKG changes  • Cardiology cleared pt for discharge  • Did not recommend any further work up  • On 12/15 had CP = Cards saw and felt was M/S     Situational depression  • Neuropsychology consulted  • Patient not interested in medications at this point in time     Malnutrition  • Recommend dietician consultation to optimize wound healing  • Glucerna makes him nauseated  • Breads etc make him too full  • On 12/15, ordered double protein     Pleural effusion  · CXR on 12/14 showed small left pleural effusion and was suspect for CHF  · ECHO 11/9/22 = LVEF 55%, unable to assess diastolic function, mild TR  · No SOB and sats are stable on RA  · Will watch for now       Discharge date:  Team    The above assessment and plan was reviewed and updated as determined by my evaluation of the patient on 12/17/2022      Labs:   Results from last 7 days   Lab Units 12/17/22  0632 12/16/22  0443   WBC Thousand/uL 18 24* 10 52*   HEMOGLOBIN g/dL 9 3* 8 0*   HEMATOCRIT % 29 2* 24 8*   PLATELETS Thousands/uL 294 300     Results from last 7 days   Lab Units 12/15/22  0516 12/14/22  0522   SODIUM mmol/L 134* 136   POTASSIUM mmol/L 3 9 3 7   CHLORIDE mmol/L 102 104   CO2 mmol/L 26 27   BUN mg/dL 15 16   CREATININE mg/dL 1 38* 1 43*   CALCIUM mg/dL 8 4 8 8             Results from last 7 days   Lab Units 12/17/22  0656 12/17/22  0538 12/16/22  2102   POC GLUCOSE mg/dl 165* 185* 154*       Review of Scheduled Meds:  Current Facility-Administered Medications   Medication Dose Route Frequency Provider Last Rate   • acetaminophen  975 mg Oral Q8H [address] GAURANG Keith     • amLODIPine  5 mg Oral BID GAURANG Keith     • aspirin  81 mg Oral Daily GAURANG Keith     • atorvastatin  40 mg Oral Daily GAURANG Keith     • calcium carbonate  500 mg Oral BID PRN GAURANG Montenegro     • cholecalciferol  400 Units Oral Daily GAURANG Keith     • heparin (porcine)  5,000 Units Subcutaneous Q8H [address] GAURANG Keith     • insulin glargine  12 Units Subcutaneous HS GAURANG Keith     • insulin lispro  1-5 Units Subcutaneous HS GAURANG Keith     • insulin lispro  1-6 Units Subcutaneous TID AC GAURANG Keith     • insulin lispro  4 Units Subcutaneous TID With Meals GAURANG Portillo     • melatonin  3 mg Oral HS GAURANG Jimenez     • methocarbamol  500 mg Oral BID GAURANG Jimenez     • multivitamin-minerals  1 tablet Oral Daily GAURANG Keith     • ondansetron  4 mg Oral Q6H PRN GAURANG Jimenez     • oxyCODONE  10 mg Oral Q6H PRN GAURANG Jimenez     • oxyCODONE  5 mg Oral Q6H PRN GAURANG Keith     • pantoprazole  40 mg Oral BID AC GAURANG Keith     • simethicone  80 mg Oral 4x Daily (with meals and at bedtime) GAURANG Jimenez     • tamsulosin  0 4 mg Oral Daily With Dinner GAURANG Jimenez         Subjective/ HPI: Patient seen and examined  Patients overnight issues or events were reviewed with nursing or staff during rounds or morning huddle session  New or overnight issues include the following:     Pt seen in his room  He reports not feeling well today  He was not able to do therapy  He denies any other complaints  ROS:   A 10 point ROS was performed; negative except as noted above  Imaging:     No orders to display       *Labs /Radiology studies reviewed  *Medications reviewed and reconciled as needed  *Please refer to order section for additional ordered labs studies  *Case discussed with primary attending during morning huddle case rounds    Physical Examination:  Vitals:   Vitals:    12/16/22 1815 12/16/22 2057 12/17/22 0109 12/17/22 0540   BP: 155/83 159/79  154/72   BP Location: Right arm Right arm  Left arm   Pulse: 96 (!) 118  100   Resp: 16 20 22   Temp: 97 5 °F (36 4 °C) 100 3 °F (37 9 °C) 97 9 °F (36 6 °C) 97 9 °F (36 6 °C)   TempSrc: Oral Oral Oral Oral   SpO2:  92%  96%   Weight:       Height:           General Appearance: no distress, conversive  Ill appearing  HEENT:  External ear normal   Nose normal w/o drainage  Mucous membranes are moist  Oropharynx is clear  Conjunctiva clear w/o icterus or redness  Neck:  Supple, normal ROM  Lungs: BBS without crackles/wheeze/rhonchi; respirations unlabored with normal inspiratory/expiratory effort    No retractions noted  On RA  CV: regular rate and rhythm; no rubs/murmurs/gallops, PMI normal   ABD: Abdomen is soft  Bowel sounds all quadrants  Nontender with no distention  VICTORINO draining milky tan fluid; perc radha tube draining bloody fluid    EXT: no edema  Skin: normal turgor, normal texture, no rashes  Psych: affect normal, mood normal  Neuro: AAO      The above physical exam was reviewed and updated as determined by my evaluation of the patient on 12/17/2022  Invasive Devices     Central Venous Catheter Line  Duration           Port A Cath 03/10/22 Right Chest 281 days          Drain  Duration           Ileostomy LUQ 29 days    Abscess Drain Abdomen 8 days    Cholecystostomy Tube 4 days                   VTE Pharmacologic Prophylaxis: Heparin  Code Status: Level 1 - Full Code  Current Length of Stay: 3 day(s)      Total time spent:  30 minutes with more than 50% spent counseling/coordinating care  Counseling includes discussion with patient re: progress  and discussion with patient of his/her current medical state/information  Coordination of patient's care was performed in conjunction with primary service  Time invested included review of patient's labs, vitals, and management of their comorbidities with continued monitoring  In addition, this patient was discussed with medical team including physician and advanced extenders  The care of the patient was extensively discussed and appropriate treatment plan was formulated unique for this patient  Medical decision making for the day was made by supervising physician unless otherwise noted in their attestation statement  ** Please Note:  voice to text software may have been used in the creation of this document   Although proof errors in transcription or interpretation are a potential of such software**

## 2022-12-18 LAB
GLUCOSE SERPL-MCNC: 161 MG/DL (ref 65–140)
GLUCOSE SERPL-MCNC: 176 MG/DL (ref 65–140)
GLUCOSE SERPL-MCNC: 189 MG/DL (ref 65–140)
GLUCOSE SERPL-MCNC: 262 MG/DL (ref 65–140)

## 2022-12-18 RX ADMIN — Medication 1 TABLET: at 08:36

## 2022-12-18 RX ADMIN — SIMETHICONE 80 MG: 80 TABLET, CHEWABLE ORAL at 17:25

## 2022-12-18 RX ADMIN — INSULIN LISPRO 3 UNITS: 100 INJECTION, SOLUTION INTRAVENOUS; SUBCUTANEOUS at 12:23

## 2022-12-18 RX ADMIN — INSULIN LISPRO 1 UNITS: 100 INJECTION, SOLUTION INTRAVENOUS; SUBCUTANEOUS at 08:36

## 2022-12-18 RX ADMIN — OXYCODONE HYDROCHLORIDE 10 MG: 10 TABLET ORAL at 14:49

## 2022-12-18 RX ADMIN — ACETAMINOPHEN 975 MG: 325 TABLET, FILM COATED ORAL at 14:39

## 2022-12-18 RX ADMIN — SIMETHICONE 80 MG: 80 TABLET, CHEWABLE ORAL at 21:32

## 2022-12-18 RX ADMIN — ERTAPENEM SODIUM 1000 MG: 1 INJECTION, POWDER, LYOPHILIZED, FOR SOLUTION INTRAMUSCULAR; INTRAVENOUS at 12:24

## 2022-12-18 RX ADMIN — CHOLECALCIFEROL TAB 10 MCG (400 UNIT) 400 UNITS: 10 TAB at 08:38

## 2022-12-18 RX ADMIN — AMLODIPINE BESYLATE 5 MG: 5 TABLET ORAL at 08:36

## 2022-12-18 RX ADMIN — INSULIN LISPRO 4 UNITS: 100 INJECTION, SOLUTION INTRAVENOUS; SUBCUTANEOUS at 12:23

## 2022-12-18 RX ADMIN — TAMSULOSIN HYDROCHLORIDE 0.4 MG: 0.4 CAPSULE ORAL at 17:26

## 2022-12-18 RX ADMIN — ASPIRIN 81 MG CHEWABLE TABLET 81 MG: 81 TABLET CHEWABLE at 08:36

## 2022-12-18 RX ADMIN — SIMETHICONE 80 MG: 80 TABLET, CHEWABLE ORAL at 12:23

## 2022-12-18 RX ADMIN — AMLODIPINE BESYLATE 5 MG: 5 TABLET ORAL at 17:26

## 2022-12-18 RX ADMIN — PANTOPRAZOLE SODIUM 40 MG: 40 TABLET, DELAYED RELEASE ORAL at 17:30

## 2022-12-18 RX ADMIN — INSULIN LISPRO 1 UNITS: 100 INJECTION, SOLUTION INTRAVENOUS; SUBCUTANEOUS at 21:35

## 2022-12-18 RX ADMIN — SIMETHICONE 80 MG: 80 TABLET, CHEWABLE ORAL at 08:36

## 2022-12-18 RX ADMIN — PANTOPRAZOLE SODIUM 40 MG: 40 TABLET, DELAYED RELEASE ORAL at 06:20

## 2022-12-18 RX ADMIN — HEPARIN SODIUM 5000 UNITS: 5000 INJECTION INTRAVENOUS; SUBCUTANEOUS at 21:34

## 2022-12-18 RX ADMIN — METHOCARBAMOL 500 MG: 500 TABLET ORAL at 17:26

## 2022-12-18 RX ADMIN — HEPARIN SODIUM 5000 UNITS: 5000 INJECTION INTRAVENOUS; SUBCUTANEOUS at 14:39

## 2022-12-18 RX ADMIN — INSULIN GLARGINE 12 UNITS: 100 INJECTION, SOLUTION SUBCUTANEOUS at 21:34

## 2022-12-18 RX ADMIN — INSULIN LISPRO 4 UNITS: 100 INJECTION, SOLUTION INTRAVENOUS; SUBCUTANEOUS at 08:37

## 2022-12-18 RX ADMIN — HEPARIN SODIUM 5000 UNITS: 5000 INJECTION INTRAVENOUS; SUBCUTANEOUS at 06:21

## 2022-12-18 RX ADMIN — ACETAMINOPHEN 975 MG: 325 TABLET, FILM COATED ORAL at 06:20

## 2022-12-18 RX ADMIN — ACETAMINOPHEN 975 MG: 325 TABLET, FILM COATED ORAL at 21:32

## 2022-12-18 RX ADMIN — ATORVASTATIN CALCIUM 40 MG: 40 TABLET, FILM COATED ORAL at 08:36

## 2022-12-18 RX ADMIN — MELATONIN TAB 3 MG 3 MG: 3 TAB at 21:33

## 2022-12-18 RX ADMIN — METHOCARBAMOL 500 MG: 500 TABLET ORAL at 08:36

## 2022-12-18 NOTE — PROGRESS NOTES
12/18/22 1400   Pain Assessment   Pain Assessment Tool 0-10   Pain Score No Pain   Restrictions/Precautions   Precautions Contact/isolation; Fall Risk;Multiple lines;Supervision on toilet/commode  (ileostomy, multiple drains)   Sit to Lying   Type of Assistance Needed Physical assistance   Physical Assistance Level 26%-50%   Comment For LLE and trunk   Sit to Lying CARE Score 3   Lying to Sitting on Side of Bed   Type of Assistance Needed Physical assistance   Physical Assistance Level 25% or less   Comment HOB elevated   Lying to Sitting on Side of Bed CARE Score 3   Sit to Stand   Type of Assistance Needed Physical assistance   Physical Assistance Level Total assistance   Comment Mod x1 with 2nd on stand by -  Pt did perform 1 stand at [address] A level- does best with 1 hand on walker, 1 hand from bed   Sit to Stand CARE Score 1   Therapeutic Interventions   Strengthening Supine Leg press MRE mod resisted 10x2,  SLR aarom (min assist needed) 10x2,  5 sit-stands at about 30 sec each   Assessment   Treatment Assessment 30 min session pt agreed for PT,  pt was in bed so recommended sitting up and perform some standing,  this was limited by lightheadedness again with standing reports from 4 to 6/10   Ed pt to really focus on slight ant lean and push through legs to stand which he did get 1 stand at light min A level  Pt did perform standing in place marching (legs did not buckle) so BP and lightheadedness permitting tomorrow would try walking again with chair follow  Pts HR does rise into 100s with minimal activity, also gets emotional and SOB so needs encouragement and cues for breathing  Overall today pt showed much better progress today vs yesterday but still very weak and deconditioned     Family/Caregiver Present Pts wife, she was pleased to  see standing   Barriers to Discharge Inaccessible home environment   Plan   Progress Progressing toward goals   PT Therapy Minutes   PT Time In 1400   PT Time Out 1430   PT Total Time (minutes) 30   PT Mode of treatment - Individual (minutes) 30   PT Mode of treatment - Concurrent (minutes) 0   PT Mode of treatment - Group (minutes) 0   PT Mode of treatment - Co-treat (minutes) 0   PT Mode of Treatment - Total time(minutes) 30 minutes   PT Cumulative Minutes 220   Therapy Time missed   Time missed?  No

## 2022-12-18 NOTE — PROGRESS NOTES
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  • s/p hepatic resection and reversal of colostomy on 11/7  • Return to the OR 11/16 for right hemicolectomy with partial descending colectomy colocolonic anastomosis with loop ileostomy and mid line abdominal VAC placement  • Possible fistula formation  • Continue local wound care; WC following  • 12/15:  Ileostomy = 500ml, radha tube = 40ml, VICTORINO drain = 55ml; U/O = 650     Acute cholecystitis  • s/p percutaneous tube placement 12/8 with tube check on 12/12  • Currently draining bloody discharge  • IR evaluated 12/14 states perc radha tube drainage may remain bloody for several days  • No further abdominal pain  • GI following due to LFT elevation      Bacteremia/abdominal abscess  • ID now following due to worsening leukocytosis  CBC in AM  • Ertapenem resumed  Follow-up blood cultures  • CT abdomen and pelvis without changes seen    • VICTORINO drain currently with thick milky drainage    Hypertension  • On Norvasc 5mg BID  • stable     Diabetes mellitus  • Continue Lantus 12U qhs/Lispro 4U TID  • Continue diabetic diet and QID Accuchecks with SSI     Acute on chronic renal failure  • Stage III; baseline 1 4  • Lisinopril currently on hold  • Stable today at 1 37     Anemia  • Multifactorial due to recent infection, surgery, CKD stage III  • Transfused on 12/15 for hemoglobin 7 3 and 12/16/22 for hgb of 8   • Hgb currently 9 3      Atypical chest pain  • With elevation in troponin/EKG changes  • Cardiology cleared pt for discharge  • Did not recommend any further work up  • On 12/15 had CP = Cards saw and felt was M/S     Situational depression  • Neuropsychology consulted  • Patient not interested in medications at this point in time     Malnutrition  • Recommend dietician consultation to optimize wound healing  • Glucerna makes him nauseated  • Breads etc make him too full  • On 12/15, ordered double protein     Pleural effusion  · CXR on 12/14 showed small left pleural effusion and was suspect for CHF  · ECHO 11/9/22 = LVEF 55%, unable to assess diastolic function, mild TR  · No SOB and sats are stable on RA  · Will watch for now       Discharge date:  Team    The above assessment and plan was reviewed and updated as determined by my evaluation of the patient on 12/18/2022      Labs:   Results from last 7 days   Lab Units 12/17/22  0632 12/16/22  0443   WBC Thousand/uL 18 24* 10 52*   HEMOGLOBIN g/dL 9 3* 8 0*   HEMATOCRIT % 29 2* 24 8*   PLATELETS Thousands/uL 294 300     Results from last 7 days   Lab Units 12/17/22  1300 12/15/22  0516   SODIUM mmol/L 136 134*   POTASSIUM mmol/L 3 7 3 9   CHLORIDE mmol/L 104 102   CO2 mmol/L 25 26   BUN mg/dL 14 15   CREATININE mg/dL 1 37* 1 38*   CALCIUM mg/dL 8 4 8 4             Results from last 7 days   Lab Units 12/18/22  0633 12/17/22  2050 12/17/22  1834   POC GLUCOSE mg/dl 161* 215* 228*       Review of Scheduled Meds:  Current Facility-Administered Medications   Medication Dose Route Frequency Provider Last Rate   • acetaminophen  975 mg Oral Q8H [address] GAURANG Keith     • amLODIPine  5 mg Oral BID GAURANG Keith     • aspirin  81 mg Oral Daily GAURANG Keith     • atorvastatin  40 mg Oral Daily GAURANG Keith     • calcium carbonate  500 mg Oral BID PRN Maceo Lanes, CRNP     • cholecalciferol  400 Units Oral Daily GAURANG Keith     • ertapenem  1,000 mg Intravenous Q24H Daniel Rees MD Stopped (12/17/22 1343)   • heparin (porcine)  5,000 Units Subcutaneous Q8H [address] GAURANG Keith     • insulin glargine  12 Units Subcutaneous HS GAURANG Keith     • insulin lispro  1-5 Units Subcutaneous HS GAURANG Keith     • insulin lispro  1-6 Units Subcutaneous TID AC GAURANG Keith     • insulin lispro  4 Units Subcutaneous TID With Meals GAURANG Lazcano     • iohexol  50 mL Oral 90 min pre-procedure Fareed Treviño MD     • melatonin  3 mg Oral HS GAURANG Keith     • methocarbamol  500 mg Oral BID GAURANG Jimenez     • multivitamin-minerals  1 tablet Oral Daily GAURANG Keith     • ondansetron  4 mg Oral Q6H PRN GAURANG Jimenez     • oxyCODONE  10 mg Oral Q6H PRN GAURANG Keith     • oxyCODONE  5 mg Oral Q6H PRN GAURANG Keith     • pantoprazole  40 mg Oral BID AC GAURANG Keith     • simethicone  80 mg Oral 4x Daily (with meals and at bedtime) GAURANG Jimenez     • tamsulosin  0 4 mg Oral Daily With Dinner GAURANG Jimenez         Subjective/ HPI: Patient seen and examined  Patients overnight issues or events were reviewed with nursing or staff during rounds or morning huddle session  New or overnight issues include the following:     Pt seen in his room  He reports feeling much better today  He was able to do therapy this AM  He denies any other complaints  ROS:   A 10 point ROS was performed; negative except as noted above  Imaging:     CT abdomen pelvis w contrast   Final Result by Raffi Flores MD (59/17 1859)      Status post cholecystostomy tube placement with decompression of the gallbladder  In addition, a catheter placed adjacent to the cut edge of the liver is increased position with near complete resolution of fluid collection  Loculated collection along the splenic recess of the lesser sac is unchanged in size  Additional perisplenic collection in the hilum and along the inferior pole are unchanged  Collection of fluid and gas in the left upper quadrant adjacent to left kidney also not significantly changed    No extravasated oral contrast             Workstation performed: [id number]             *Labs /Radiology studies reviewed  *Medications reviewed and reconciled as needed  *Please refer to order section for additional ordered labs studies  *Case discussed with primary attending during morning huddle case rounds    Physical Examination:  Vitals:   Vitals:    12/17/22 1717 12/17/22 2122 12/18/22 0456 12/18/22 0836   BP: (!) 177/91 140/78 146/76 134/78   BP Location:  Left arm Left arm    Pulse:  83 86    Resp:  16 17    Temp:  97 9 °F (36 6 °C) (!) 97 4 °F (36 3 °C)    TempSrc:  Oral Oral    SpO2:  93% 100%    Weight:       Height:           General Appearance: no distress, conversive  HEENT:  External ear normal   Nose normal w/o drainage  Mucous membranes are moist  Oropharynx is clear  Conjunctiva clear w/o icterus or redness  Neck:  Supple, normal ROM  Lungs: BBS without crackles/wheeze/rhonchi; respirations unlabored with normal inspiratory/expiratory effort  No retractions noted  On RA  CV: regular rate and rhythm; no rubs/murmurs/gallops, PMI normal   ABD: Abdomen is soft  Bowel sounds all quadrants  Nontender with no distention  VICTORINO draining milky tan fluid; perc radha tube draining bloody fluid    EXT: No edema  Skin: normal turgor, normal texture, no rashes  Psych: affect normal, mood normal  Neuro: AAO      The above physical exam was reviewed and updated as determined by my evaluation of the patient on 12/18/2022  Invasive Devices     Central Venous Catheter Line  Duration           Port A Cath 03/10/22 Right Chest 282 days          Drain  Duration           Ileostomy LUQ 30 days    Abscess Drain Abdomen 9 days    Cholecystostomy Tube 5 days                   VTE Pharmacologic Prophylaxis: Heparin  Code Status: Level 1 - Full Code  Current Length of Stay: 4 day(s)      Total time spent:  30 minutes with more than 50% spent counseling/coordinating care  Counseling includes discussion with patient re: progress  and discussion with patient of his/her current medical state/information   Coordination of patient's care was performed in conjunction with primary service  Time invested included review of patient's labs, vitals, and management of their comorbidities with continued monitoring  In addition, this patient was discussed with medical team including physician and advanced extenders  The care of the patient was extensively discussed and appropriate treatment plan was formulated unique for this patient  Medical decision making for the day was made by supervising physician unless otherwise noted in their attestation statement  ** Please Note:  voice to text software may have been used in the creation of this document   Although proof errors in transcription or interpretation are a potential of such software**

## 2022-12-18 NOTE — PROGRESS NOTES
12/18/22 0939   Pain Assessment   Pain Assessment Tool 0-10   Pain Score No Pain   Restrictions/Precautions   Precautions Fall Risk;Contact/isolation;Multiple lines;Supervision on toilet/commode  (Ileostomy, R cholecystostomy drain, L VICTORINO drain)   Weight Bearing Restrictions No   ROM Restrictions No   Lifestyle   Autonomy "I can't believe how weak I am "   Oral Hygiene   Type of Assistance Needed Supervision;Set-up / clean-up   Physical Assistance Level No physical assistance   Comment chair position, req set-up w/ A to open containers  Oral Hygiene CARE Score 4   Grooming   Able To Wash/Dry Face;Brush/Clean Teeth   Lying to Sitting on Side of Bed   Type of Assistance Needed Physical assistance   Physical Assistance Level 26%-50%   Comment HOB elevated, heavily relies on bed rail  Inc time  Lying to Sitting on Side of Bed CARE Score 3   Sit to Stand   Type of Assistance Needed Physical assistance   Physical Assistance Level Total assistance   Comment Mod Ax1, CGA-Min A of 2nd person  Cues for proper hand placement, inc time  Sit to Stand CARE Score 1   Bed-Chair Transfer   Type of Assistance Needed Physical assistance;Verbal cues; Adaptive equipment;Set-up / clean-up   Physical Assistance Level Total assistance   Comment Mod Ax1, Min-Mod A of 2nd person stand pivot w/ RW req inc time  Add'lly assessed sit pivot 2* feelings of dizziness following stand pivot, Mod Ax2 sit pivot w/ Mod vc's for proper hand placement and body mechanics  Req extended rest breaks to manage fatigue  Chair/Bed-to-Chair Transfer CARE Score 1   Exercise Tools   Theraband Provided w/ red hand sponge to maximize [address] to inc indep w/ ADLs  Completed 3x10 b/l hand squeezes, thumb flexion/ext, lumbrical pinch   Other Exercise Tool 1 While in chair mode, completed yellow theraband ex 3x10 elbow flexion, horizontal shoulder ABD, and unilateral shoulder ABD   Tolerated w/ Min vc's to maximize technique and extended rest breaks to manage fatigue  Cognition   Overall Cognitive Status WFL   Comments req reorientation near end of session, pt asking if it was 2PM    Activity Tolerance   Medical Staff Made Aware notified RN of soft SBP  Seated manual 110, in stance 96 and pt reported lightheadedness  May benefit from b/l compression stockings  Assessment   Treatment Assessment Pt seen for 30 minute OT/PT co-treat session focusing on fxnl xfers and fxnl standing tolerance  Completed stand pivot w/ Mod Ax1 and CGA-Min A of 2nd person, req inc time and pt reported lightheadedness  Subsided following several minutes seated and cues to maximize deep breathing  SBP, manual , while in stance ~20 seconds SBP 96; may benefit from b/l compression stockings w/ plan to discuss w/ med team  Practiced sit pivots, req Mod Ax2 and Mod vc's for sequencing to inc safety and dec fear of falling  Add'lly seen for 30 minutes individual OT focusing on BUE strengthening, positioning in chair mode, and grooming  Time spent rapport building, pt became tearful x3 t/o session 2* difficulty adjusting to medical complications, extended emotional support and encouragement provided  Cont OT POC: inc time OOB, fxnl xfers, ADL retraining, BUE/FMC strengthening, unsupported sitting balance/tolerance, and toileting  Prognosis Good   Problem List Decreased strength;Decreased range of motion;Decreased endurance; Impaired balance;Decreased mobility;Pain   Plan   Treatment/Interventions ADL retraining;Functional transfer training; Therapeutic exercise; Endurance training;Patient/family training;Equipment eval/education   Progress Slow progress, decreased activity tolerance   Recommendation   OT Discharge Recommendation   (pending progress)   OT Therapy Minutes   OT Time In 0930   OT Time Out 1030   OT Total Time (minutes) 60   OT Mode of treatment - Individual (minutes) 30   OT Mode of treatment - Concurrent (minutes) 0   OT Mode of treatment - Group (minutes) 0   OT Mode of treatment - Co-treat (minutes) 30   OT Mode of Treatment - Total time(minutes) 60 minutes   OT Cumulative Minutes 270   Therapy Time missed   Time missed?  No

## 2022-12-18 NOTE — PROGRESS NOTES
PM&R Coverage Progress Note:    Rehabilitation Diagnosis: Critical illness myopathy    ASSESSMENT: Fatigue, loss of appetite      PLAN:    Rehabilitation  • Continue current rehabilitation plan of care to maximize function  • Participating in therapies today, feels a bit better, eating well    Medical issues  • Continues on Ertapenem per ID recs  • CT A/P stable  • Consult IR to comment on ability to, risk/benefit of placing drain for left sided collection  • Ileostomy output appropriate  • Afebrile  • Continue current medical plan of care  Appreciate IM consultants co-management  SUBJECTIVE: Patient seen face to face  No acute issues  Progressing as expected in rehabilitation program  Able to participate today, back on Antibiotics for longer course  Eating well today  CT A/P completed  GI and ID now on board  Patient denies fever, chills, nausea, emesis, cough, shortness of breath, diarrhea, or constipation  Sleep was fine, mood stable, but he remains tearful at times  Pain is controlled  ROS:  A ten point review of systems was completed on 12/18/22 and pertinent positives are listed in subjective section  All other systems reviewed were negative  OBJECTIVE:   BP 96/68   Pulse 86   Temp (!) 97 4 °F (36 3 °C) (Oral)   Resp 17   Ht 5' 10" (1 778 m)   Wt 104 kg (230 lb)   SpO2 100%   BMI 33 00 kg/m²     Physical Exam  Vitals and nursing note reviewed  Constitutional:       General: He is not in acute distress  HENT:      Head: Normocephalic and atraumatic  Right Ear: External ear normal       Left Ear: External ear normal       Nose: Nose normal  No rhinorrhea  Mouth/Throat:      Mouth: Mucous membranes are moist       Pharynx: Oropharynx is clear  Cardiovascular:      Rate and Rhythm: Normal rate  Pulses: Normal pulses  Pulmonary:      Effort: Pulmonary effort is normal  No respiratory distress  Abdominal:      General: There is no distension        Palpations: Abdomen is soft  Comments: Ileostomy stable, perc radha tube with sanguinous drainage, VICTORINO drain with thick tan fluid   Musculoskeletal:      Right lower leg: No edema  Left lower leg: No edema  Skin:     General: Skin is warm and dry  Neurological:      Mental Status: He is alert and oriented to person, place, and time     Psychiatric:         Behavior: Behavior normal       Comments: Mood improved today          Lab Results   Component Value Date    WBC 18 24 (H) 12/17/2022    HGB 9 3 (L) 12/17/2022    HCT 29 2 (L) 12/17/2022    MCV 85 12/17/2022     12/17/2022     Lab Results   Component Value Date    SODIUM 136 12/17/2022    K 3 7 12/17/2022     12/17/2022    CO2 25 12/17/2022    BUN 14 12/17/2022    CREATININE 1 37 (H) 12/17/2022    GLUC 201 (H) 12/17/2022    CALCIUM 8 4 12/17/2022     Lab Results   Component Value Date    INR 1 10 11/12/2022    INR 1 24 (H) 11/09/2022    INR 1 0 09/26/2022    PROTIME 14 4 11/12/2022    PROTIME 15 8 (H) 11/09/2022    PROTIME 10 6 09/26/2022           Current Facility-Administered Medications:   •  acetaminophen (TYLENOL) tablet 975 mg, 975 mg, Oral, Q8H [facility], GAURANG Keith, 975 mg at 12/18/22 0620  •  amLODIPine (NORVASC) tablet 5 mg, 5 mg, Oral, BID, GAURANG Keith, 5 mg at 12/18/22 7585  •  aspirin chewable tablet 81 mg, 81 mg, Oral, Daily, GAURANG Keith, 81 mg at 12/18/22 0836  •  atorvastatin (LIPITOR) tablet 40 mg, 40 mg, Oral, Daily, GAURANG Keith, 40 mg at 12/18/22 0836  •  calcium carbonate (TUMS) chewable tablet 500 mg, 500 mg, Oral, BID PRN, GAURANG Keith, 500 mg at 12/16/22 2641  •  cholecalciferol (VITAMIN D3) tablet 400 Units, 400 Units, Oral, Daily, GAURANG Keith, 400 Units at 12/18/22 9457  •  ertapenem (INVanz) 1,000 mg in sodium chloride 0 9 % 50 mL IVPB, 1,000 mg, Intravenous, Q24H, Beni Samuel MD, Last Rate: 100 mL/hr at 12/18/22 1224, 1,000 mg at 12/18/22 1224  •  heparin (porcine) subcutaneous injection 5,000 Units, 5,000 Units, Subcutaneous, Q8H [facility], GAURANG Keith, 5,000 Units at 12/18/22 2974  •  insulin glargine (LANTUS) subcutaneous injection 12 Units 0 12 mL, 12 Units, Subcutaneous, HS, GAURANG Keith, 12 Units at 12/17/22 2209  •  insulin lispro (HumaLOG) 100 units/mL subcutaneous injection 1-5 Units, 1-5 Units, Subcutaneous, HS, GAURANG Keith, 1 Units at 12/17/22 2210  •  insulin lispro (HumaLOG) 100 units/mL subcutaneous injection 1-6 Units, 1-6 Units, Subcutaneous, TID AC, 3 Units at 12/18/22 1223 **AND** Fingerstick Glucose (POCT), , , TID AC, GAURANG Keith  •  insulin lispro (HumaLOG) 100 units/mL subcutaneous injection 4 Units, 4 Units, Subcutaneous, TID With Meals, GAURANG Pearson, 4 Units at 12/18/22 1223  •  iohexol (OMNIPAQUE) 240 MG/ML solution 50 mL, 50 mL, Oral, 90 min pre-procedure, Sonia Ortez MD  •  melatonin tablet 3 mg, 3 mg, Oral, HS, GAURANG Keith, 3 mg at 12/17/22 2206  •  methocarbamol (ROBAXIN) tablet 500 mg, 500 mg, Oral, BID, GAURANG Keith, 500 mg at 12/18/22 0836  •  multivitamin-minerals (CENTRUM) tablet 1 tablet, 1 tablet, Oral, Daily, GAURANG Keith, 1 tablet at 12/18/22 0836  •  ondansetron (ZOFRAN-ODT) dispersible tablet 4 mg, 4 mg, Oral, Q6H PRN, GAURANG Keith, 4 mg at 12/17/22 5683  •  oxyCODONE (ROXICODONE) immediate release tablet 10 mg, 10 mg, Oral, Q6H PRN, GAURANG Keith, 10 mg at 12/17/22 1718  •  oxyCODONE (ROXICODONE) IR tablet 5 mg, 5 mg, Oral, Q6H PRN, GAURANG Keith  •  pantoprazole (PROTONIX) EC tablet 40 mg, 40 mg, Oral, BID AC, GAURANG Keith, 40 mg at 12/18/22 0620  •  simethicone (MYLICON) chewable tablet 80 mg, 80 mg, Oral, 4x Daily (with meals and at bedtime), GAURANG Keith, 80 mg at 12/18/22 1223  •  tamsulosin (FLOMAX) capsule 0 4 mg, 0 4 mg, Oral, Daily With Dinner, Dago Pollock, GAURANG, 0 4 mg at 12/17/22 1717    Past Medical History:   Diagnosis Date   • Abdominal pain 03/12/2022   • Acute renal failure ([place] [address] )     [id number] resolved   • Cancer ([place] [address] )    • Diabetes mellitus ([place] [address] )    • Enteritis 08/23/2016   • Gastroparesis due to DM ([place] [address] ) 08/23/2016   • GERD (gastroesophageal reflux disease)    • Hernia, ventral 08/04/2016   • Hyperlipidemia    • Hypertension    • Morbid obesity ([place] [address] ) 04/17/2018   • Postoperative visit 03/02/2022   • SIRS (systemic inflammatory response syndrome) ([place] [address] ) 03/12/2022   • Snoring    • Stage 3a chronic kidney disease ([place] [address] ) 02/19/2022       Patient Active Problem List    Diagnosis Date Noted   • Malignant neoplasm of transverse colon ([place] [address] ) 03/01/2022   • Sepsis ([place] [address] ) 12/17/2022   • Severe protein-calorie malnutrition ([place] [address] ) 12/14/2022   • Acute kidney injury ([place] [address] ) 12/14/2022   • Flash pulmonary edema ([place] [address] ) 11/12/2022   • MR (mitral regurgitation) 11/12/2022   • Bacteremia 11/12/2022   • ESBL (extended spectrum beta-lactamase) producing bacteria infection 11/12/2022   • Acute respiratory failure with hypoxia ([place] [address] ) 11/12/2022   • Encephalopathy 11/09/2022   • Cervical radiculopathy 10/26/2022   • Other fatigue 06/15/2022   • Colostomy prolapse ([place] [address] ) 05/27/2022   • Colon cancer metastasized to liver ([place] [address] ) 03/12/2022   • 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 liver ([place] [address] ) 03/02/2022   • Iron deficiency anemia, unspecified 03/01/2022   • Thrombocytosis 02/21/2022   • Hypokalemia 02/19/2022   • Transaminitis 02/19/2022   • Type 2 diabetes mellitus with hyperlipidemia ([place] [address] ) 02/18/2022   • Colonic mass 02/18/2022   • Microcytic anemia 02/18/2022   • Left ureteral calculus 01/30/2020   • Incarcerated umbilical hernia 35/81/2934   • Testicular hypogonadism 06/19/2017   • Low testosterone 05/30/2017   • Type 2 diabetes mellitus without complication, with long-term current use of insulin ([facility] [address] ) 08/23/2016   • Benign essential hypertension 08/23/2016   • Mixed hyperlipidemia 08/23/2016   • Erectile dysfunction 07/11/2016   • Obesity (BMI 30-39 9) 07/11/2016 Veldon Cowden,   Physical Medicine and Chris 12      Total time spent:  20 minutes with more than 50% spent counseling/coordinating care  Counseling includes discussion with patient re: progress and discussion with patient of his/her current medical/functional state/information  Coordination of patient's care was performed in conjunction with consulting services  Time invested included review of patient's labs, vitals, and management of their comorbidities with continued monitoring  The care of the patient was extensively discussed and appropriate treatment plan was formulated unique for this patient

## 2022-12-18 NOTE — PROGRESS NOTES
12/18/22 0930   Restrictions/Precautions   Precautions Fall Risk;Contact/isolation;Multiple lines;Supervision on toilet/commode  (Multiple drains, Ileostomy)   Lying to Sitting on Side of Bed   Type of Assistance Needed Physical assistance   Physical Assistance Level 26%-50%   Comment Mod A - HOB was elevated, relies on bed rails   Lying to Sitting on Side of Bed CARE Score 3   Sit to Stand   Type of Assistance Needed Physical assistance   Physical Assistance Level Total assistance   Comment Mod A to rise with 2nd Min A - standing to RW   Sit to Stand CARE Score 1   Bed-Chair Transfer   Type of Assistance Needed Physical assistance   Physical Assistance Level Total assistance   Comment Mod A x2 stand pivot with RW - and also performed sit pivot (ed on performance because pt with lightheadedness during standing and dec in BP noted   Chair/Bed-to-Chair Transfer CARE Score 1   Walk 10 Feet   Reason if not Attempted Safety concerns   Walk 10 Feet CARE Score 88   Assessment   Treatment Assessment 30 min Co-treat with OT performed due to decline in function yesterday ( focused on getting pt to edge of bed which he was not out all day yesterday), Performed stand pivot with RW which + lightheadedness  Educated pt on sit pivot xfers which performed 2 at Mod Ax 2  Pt showed some improvement today vs yesterday not being able to perform therapy  Will benefit from cont strengthening and out of bed activity  Barriers to Discharge Inaccessible home environment   PT Barriers   Physical Impairment Decreased strength;Decreased endurance; Impaired balance;Decreased mobility; Impaired sensation;Decreased skin integrity;Pain   Plan   Progress Slow progress, decreased activity tolerance   PT Therapy Minutes   PT Time In 0930   PT Time Out 1000   PT Total Time (minutes) 30   PT Mode of treatment - Individual (minutes) 0   PT Mode of treatment - Concurrent (minutes) 0   PT Mode of treatment - Group (minutes) 0   PT Mode of treatment - Co-treat (minutes) 30   PT Mode of Treatment - Total time(minutes) 30 minutes   PT Cumulative Minutes 190   Therapy Time missed   Time missed?  No

## 2022-12-18 NOTE — PLAN OF CARE
Problem: Prexisting or High Potential for Compromised Skin Integrity  Goal: Skin integrity is maintained or improved  Description: INTERVENTIONS:  - Identify patients at risk for skin breakdown  - Assess and monitor skin integrity  - Assess and monitor nutrition and hydration status  - Monitor labs   - Assess for incontinence   - Turn and reposition patient  - Assist with mobility/ambulation  - Relieve pressure over bony prominences  - Avoid friction and shearing  - Provide appropriate hygiene as needed including keeping skin clean and dry  - Evaluate need for skin moisturizer/barrier cream  - Collaborate with interdisciplinary team   - Patient/family teaching  - Consider wound care consult   Outcome: Progressing     Problem: Potential for Falls  Goal: Patient will remain free of falls  Description: INTERVENTIONS:  - Educate patient/family on patient safety including physical limitations  - Instruct patient to call for assistance with activity   - Consult OT/PT to assist with strengthening/mobility   - Keep Call bell within reach  - Keep bed low and locked with side rails adjusted as appropriate  - Keep care items and personal belongings within reach  - Initiate and maintain comfort rounds  - Make Fall Risk Sign visible to staff  - Offer Toileting every  Hours, in advance of need  - Initiate/Maintain alarm  - Obtain necessary fall risk management equipment:   - Apply yellow socks and bracelet for high fall risk patients  - Consider moving patient to room near nurses station  Outcome: Progressing     Problem: PAIN - ADULT  Goal: Verbalizes/displays adequate comfort level or baseline comfort level  Description: Interventions:  - Encourage patient to monitor pain and request assistance  - Assess pain using appropriate pain scale  - Administer analgesics based on type and severity of pain and evaluate response  - Implement non-pharmacological measures as appropriate and evaluate response  - Consider cultural and social influences on pain and pain management  - Notify physician/advanced practitioner if interventions unsuccessful or patient reports new pain  Outcome: Progressing     Problem: INFECTION - ADULT  Goal: Absence or prevention of progression during hospitalization  Description: INTERVENTIONS:  - Assess and monitor for signs and symptoms of infection  - Monitor lab/diagnostic results  - Monitor all insertion sites, i e  indwelling lines, tubes, and drains  - Monitor endotracheal if appropriate and nasal secretions for changes in amount and color  - [place] appropriate cooling/warming therapies per order  - Administer medications as ordered  - Instruct and encourage patient and family to use good hand hygiene technique  - Identify and instruct in appropriate isolation precautions for identified infection/condition  Outcome: Progressing     Problem: SAFETY ADULT  Goal: Patient will remain free of falls  Description: INTERVENTIONS:  - Educate patient/family on patient safety including physical limitations  - Instruct patient to call for assistance with activity   - Consult OT/PT to assist with strengthening/mobility   - Keep Call bell within reach  - Keep bed low and locked with side rails adjusted as appropriate  - Keep care items and personal belongings within reach  - Initiate and maintain comfort rounds  - Make Fall Risk Sign visible to staff  - Offer Toileting every  Hours, in advance of need  - Initiate/Maintain alarm  - Obtain necessary fall risk management equipment:   - Apply yellow socks and bracelet for high fall risk patients  - Consider moving patient to room near nurses station  Outcome: Progressing  Goal: Maintain or return to baseline ADL function  Description: INTERVENTIONS:  -  Assess patient's ability to carry out ADLs; assess patient's baseline for ADL function and identify physical deficits which impact ability to perform ADLs (bathing, care of mouth/teeth, toileting, grooming, dressing, etc )  - Assess/evaluate cause of self-care deficits   - Assess range of motion  - Assess patient's mobility; develop plan if impaired  - Assess patient's need for assistive devices and provide as appropriate  - Encourage maximum independence but intervene and supervise when necessary  - Involve family in performance of ADLs  - Assess for home care needs following discharge   - Consider OT consult to assist with ADL evaluation and planning for discharge  - Provide patient education as appropriate  Outcome: Progressing  Goal: Maintains/Returns to pre admission functional level  Description: INTERVENTIONS:  - Perform BMAT or MOVE assessment daily    - Set and communicate daily mobility goal to care team and patient/family/caregiver  - Collaborate with rehabilitation services on mobility goals if consulted  - Perform Range of Motion  times a day  - Reposition patient every  hours    - Dangle patient times a day  - Stand patient  times a day  - Ambulate patient  times a day  - Out of bed to chair  times a day   - Out of bed for meals  times a day  - Out of bed for toileting  - Record patient progress and toleration of activity level   Outcome: Progressing     Problem: DISCHARGE PLANNING  Goal: Discharge to home or other facility with appropriate resources  Description: INTERVENTIONS:  - Identify barriers to discharge w/patient and caregiver  - Arrange for needed discharge resources and transportation as appropriate  - Identify discharge learning needs (meds, wound care, etc )  - Arrange for interpretive services to assist at discharge as needed  - Refer to Case Management Department for coordinating discharge planning if the patient needs post-hospital services based on physician/advanced practitioner order or complex needs related to functional status, cognitive ability, or social support system  Outcome: Progressing     Problem: Nutrition/Hydration-ADULT  Goal: Nutrient/Hydration intake appropriate for improving, restoring or maintaining nutritional needs  Description: Monitor and assess patient's nutrition/hydration status for malnutrition  Collaborate with interdisciplinary team and initiate plan and interventions as ordered  Monitor patient's weight and dietary intake as ordered or per policy  Utilize nutrition screening tool and intervene as necessary  Determine patient's food preferences and provide high-protein, high-caloric foods as appropriate       INTERVENTIONS:  - Monitor oral intake, urinary output, labs, and treatment plans  - Assess nutrition and hydration status and recommend course of action  - Evaluate amount of meals eaten  - Assist patient with eating if necessary   - Allow adequate time for meals  - Recommend/ encourage appropriate diets, oral nutritional supplements, and vitamin/mineral supplements  - Order, calculate, and assess calorie counts as needed  - Recommend, monitor, and adjust tube feedings and TPN/PPN based on assessed needs  - Assess need for intravenous fluids  - Provide specific nutrition/hydration education as appropriate  - Include patient/family/caregiver in decisions related to nutrition  Outcome: Progressing     Problem: MOBILITY - ADULT  Goal: Maintain or return to baseline ADL function  Description: INTERVENTIONS:  -  Assess patient's ability to carry out ADLs; assess patient's baseline for ADL function and identify physical deficits which impact ability to perform ADLs (bathing, care of mouth/teeth, toileting, grooming, dressing, etc )  - Assess/evaluate cause of self-care deficits   - Assess range of motion  - Assess patient's mobility; develop plan if impaired  - Assess patient's need for assistive devices and provide as appropriate  - Encourage maximum independence but intervene and supervise when necessary  - Involve family in performance of ADLs  - Assess for home care needs following discharge   - Consider OT consult to assist with ADL evaluation and planning for discharge  - Provide patient education as appropriate  Outcome: Progressing  Goal: Maintains/Returns to pre admission functional level  Description: INTERVENTIONS:  - Perform BMAT or MOVE assessment daily    - Set and communicate daily mobility goal to care team and patient/family/caregiver  - Collaborate with rehabilitation services on mobility goals if consulted  - Perform Range of Motion  times a day  - Reposition patient every  hours    - Dangle patient  times a day  - Stand patient  times a day  - Ambulate patient  times a day  - Out of bed to chair  times a day   - Out of bed for meal times a day  - Out of bed for toileting  - Record patient progress and toleration of activity level   Outcome: Progressing

## 2022-12-18 NOTE — NURSING NOTE
Patient not wanting to eat except for a popsicle after having an abdominal CT  Blood sugars 211 at 1554 and 228 at 1834  Contact Demetrio Tucker, ordered to give 4 units standing

## 2022-12-19 LAB
ALBUMIN SERPL BCP-MCNC: 1.7 G/DL (ref 3.5–5)
ALP SERPL-CCNC: 989 U/L (ref 46–116)
ALT SERPL W P-5'-P-CCNC: 19 U/L (ref 12–78)
ANION GAP SERPL CALCULATED.3IONS-SCNC: 7 MMOL/L (ref 4–13)
AST SERPL W P-5'-P-CCNC: 61 U/L (ref 5–45)
BASOPHILS # BLD AUTO: 0.04 THOUSANDS/ÂΜL (ref 0–0.1)
BASOPHILS NFR BLD AUTO: 0 % (ref 0–1)
BILIRUB DIRECT SERPL-MCNC: 1.01 MG/DL (ref 0–0.2)
BILIRUB SERPL-MCNC: 1.37 MG/DL (ref 0.2–1)
BUN SERPL-MCNC: 16 MG/DL (ref 5–25)
CALCIUM SERPL-MCNC: 8.4 MG/DL (ref 8.3–10.1)
CHLORIDE SERPL-SCNC: 105 MMOL/L (ref 96–108)
CO2 SERPL-SCNC: 24 MMOL/L (ref 21–32)
CREAT SERPL-MCNC: 1.19 MG/DL (ref 0.6–1.3)
EOSINOPHIL # BLD AUTO: 0 THOUSAND/ÂΜL (ref 0–0.61)
EOSINOPHIL NFR BLD AUTO: 0 % (ref 0–6)
ERYTHROCYTE [DISTWIDTH] IN BLOOD BY AUTOMATED COUNT: 17.6 % (ref 11.6–15.1)
GFR SERPL CREATININE-BSD FRML MDRD: 66 ML/MIN/1.73SQ M
GLUCOSE P FAST SERPL-MCNC: 144 MG/DL (ref 65–99)
GLUCOSE SERPL-MCNC: 127 MG/DL (ref 65–140)
GLUCOSE SERPL-MCNC: 136 MG/DL (ref 65–140)
GLUCOSE SERPL-MCNC: 144 MG/DL (ref 65–140)
GLUCOSE SERPL-MCNC: 155 MG/DL (ref 65–140)
GLUCOSE SERPL-MCNC: 161 MG/DL (ref 65–140)
HCT VFR BLD AUTO: 27.2 % (ref 36.5–49.3)
HGB BLD-MCNC: 8.7 G/DL (ref 12–17)
IMM GRANULOCYTES # BLD AUTO: 0.13 THOUSAND/UL (ref 0–0.2)
IMM GRANULOCYTES NFR BLD AUTO: 1 % (ref 0–2)
INR PPP: 1.12 (ref 0.84–1.19)
LYMPHOCYTES # BLD AUTO: 1.52 THOUSANDS/ÂΜL (ref 0.6–4.47)
LYMPHOCYTES NFR BLD AUTO: 12 % (ref 14–44)
MCH RBC QN AUTO: 27.5 PG (ref 26.8–34.3)
MCHC RBC AUTO-ENTMCNC: 32 G/DL (ref 31.4–37.4)
MCV RBC AUTO: 86 FL (ref 82–98)
MITOCHONDRIA M2 IGG SER-ACNC: <20 UNITS (ref 0–20)
MONOCYTES # BLD AUTO: 1.3 THOUSAND/ÂΜL (ref 0.17–1.22)
MONOCYTES NFR BLD AUTO: 10 % (ref 4–12)
NEUTROPHILS # BLD AUTO: 9.83 THOUSANDS/ÂΜL (ref 1.85–7.62)
NEUTS SEG NFR BLD AUTO: 77 % (ref 43–75)
NRBC BLD AUTO-RTO: 0 /100 WBCS
PLATELET # BLD AUTO: 332 THOUSANDS/UL (ref 149–390)
PMV BLD AUTO: 9.6 FL (ref 8.9–12.7)
POTASSIUM SERPL-SCNC: 3.5 MMOL/L (ref 3.5–5.3)
PROT SERPL-MCNC: 8.1 G/DL (ref 6.4–8.4)
PROTHROMBIN TIME: 14.7 SECONDS (ref 11.6–14.5)
RBC # BLD AUTO: 3.16 MILLION/UL (ref 3.88–5.62)
SODIUM SERPL-SCNC: 136 MMOL/L (ref 135–147)
WBC # BLD AUTO: 12.82 THOUSAND/UL (ref 4.31–10.16)

## 2022-12-19 RX ORDER — INSULIN LISPRO 100 [IU]/ML
6 INJECTION, SOLUTION INTRAVENOUS; SUBCUTANEOUS
Status: DISCONTINUED | OUTPATIENT
Start: 2022-12-19 | End: 2022-12-20

## 2022-12-19 RX ORDER — INSULIN GLARGINE 100 [IU]/ML
6 INJECTION, SOLUTION SUBCUTANEOUS
Status: COMPLETED | OUTPATIENT
Start: 2022-12-19 | End: 2022-12-19

## 2022-12-19 RX ORDER — POTASSIUM CHLORIDE 20 MEQ/1
40 TABLET, EXTENDED RELEASE ORAL ONCE
Status: COMPLETED | OUTPATIENT
Start: 2022-12-19 | End: 2022-12-19

## 2022-12-19 RX ORDER — INSULIN GLARGINE 100 [IU]/ML
12 INJECTION, SOLUTION SUBCUTANEOUS
Status: DISCONTINUED | OUTPATIENT
Start: 2022-12-20 | End: 2022-12-20

## 2022-12-19 RX ADMIN — INSULIN LISPRO 6 UNITS: 100 INJECTION, SOLUTION INTRAVENOUS; SUBCUTANEOUS at 16:42

## 2022-12-19 RX ADMIN — ATORVASTATIN CALCIUM 40 MG: 40 TABLET, FILM COATED ORAL at 08:16

## 2022-12-19 RX ADMIN — SIMETHICONE 80 MG: 80 TABLET, CHEWABLE ORAL at 13:06

## 2022-12-19 RX ADMIN — METHOCARBAMOL 500 MG: 500 TABLET ORAL at 08:17

## 2022-12-19 RX ADMIN — CALCIUM CARBONATE (ANTACID) CHEW TAB 500 MG 500 MG: 500 CHEW TAB at 17:15

## 2022-12-19 RX ADMIN — ERTAPENEM SODIUM 1000 MG: 1 INJECTION, POWDER, LYOPHILIZED, FOR SOLUTION INTRAMUSCULAR; INTRAVENOUS at 20:05

## 2022-12-19 RX ADMIN — ACETAMINOPHEN 975 MG: 325 TABLET, FILM COATED ORAL at 13:06

## 2022-12-19 RX ADMIN — SIMETHICONE 80 MG: 80 TABLET, CHEWABLE ORAL at 08:17

## 2022-12-19 RX ADMIN — AMLODIPINE BESYLATE 5 MG: 5 TABLET ORAL at 17:09

## 2022-12-19 RX ADMIN — HEPARIN SODIUM 5000 UNITS: 5000 INJECTION INTRAVENOUS; SUBCUTANEOUS at 05:27

## 2022-12-19 RX ADMIN — INSULIN GLARGINE 6 UNITS: 100 INJECTION, SOLUTION SUBCUTANEOUS at 21:18

## 2022-12-19 RX ADMIN — SIMETHICONE 80 MG: 80 TABLET, CHEWABLE ORAL at 21:34

## 2022-12-19 RX ADMIN — AMLODIPINE BESYLATE 5 MG: 5 TABLET ORAL at 08:17

## 2022-12-19 RX ADMIN — TAMSULOSIN HYDROCHLORIDE 0.4 MG: 0.4 CAPSULE ORAL at 16:41

## 2022-12-19 RX ADMIN — HEPARIN SODIUM 5000 UNITS: 5000 INJECTION INTRAVENOUS; SUBCUTANEOUS at 21:16

## 2022-12-19 RX ADMIN — POTASSIUM CHLORIDE 40 MEQ: 1500 TABLET, EXTENDED RELEASE ORAL at 13:06

## 2022-12-19 RX ADMIN — ACETAMINOPHEN 975 MG: 325 TABLET, FILM COATED ORAL at 21:15

## 2022-12-19 RX ADMIN — HEPARIN SODIUM 5000 UNITS: 5000 INJECTION INTRAVENOUS; SUBCUTANEOUS at 14:13

## 2022-12-19 RX ADMIN — INSULIN LISPRO 1 UNITS: 100 INJECTION, SOLUTION INTRAVENOUS; SUBCUTANEOUS at 13:10

## 2022-12-19 RX ADMIN — INSULIN LISPRO 4 UNITS: 100 INJECTION, SOLUTION INTRAVENOUS; SUBCUTANEOUS at 08:19

## 2022-12-19 RX ADMIN — ASPIRIN 81 MG CHEWABLE TABLET 81 MG: 81 TABLET CHEWABLE at 08:17

## 2022-12-19 RX ADMIN — INSULIN LISPRO 6 UNITS: 100 INJECTION, SOLUTION INTRAVENOUS; SUBCUTANEOUS at 13:11

## 2022-12-19 RX ADMIN — PANTOPRAZOLE SODIUM 40 MG: 40 TABLET, DELAYED RELEASE ORAL at 16:41

## 2022-12-19 RX ADMIN — OXYCODONE HYDROCHLORIDE 10 MG: 10 TABLET ORAL at 21:15

## 2022-12-19 RX ADMIN — SIMETHICONE 80 MG: 80 TABLET, CHEWABLE ORAL at 16:41

## 2022-12-19 RX ADMIN — CHOLECALCIFEROL TAB 10 MCG (400 UNIT) 400 UNITS: 10 TAB at 08:19

## 2022-12-19 RX ADMIN — Medication 1 TABLET: at 08:16

## 2022-12-19 RX ADMIN — ACETAMINOPHEN 975 MG: 325 TABLET, FILM COATED ORAL at 05:27

## 2022-12-19 RX ADMIN — PANTOPRAZOLE SODIUM 40 MG: 40 TABLET, DELAYED RELEASE ORAL at 08:17

## 2022-12-19 RX ADMIN — INSULIN LISPRO 1 UNITS: 100 INJECTION, SOLUTION INTRAVENOUS; SUBCUTANEOUS at 16:42

## 2022-12-19 RX ADMIN — MELATONIN TAB 3 MG 3 MG: 3 TAB at 20:09

## 2022-12-19 RX ADMIN — METHOCARBAMOL 500 MG: 500 TABLET ORAL at 17:09

## 2022-12-19 NOTE — PROGRESS NOTES
12/19/22 1000   Pain Assessment   Pain Assessment Tool 0-10   Pain Score No Pain   Restrictions/Precautions   Precautions Contact/isolation; Fall Risk;Multiple lines;Supervision on toilet/commode  (ileostomy, IR drain, VICTORINO drain)   Weight Bearing Restrictions No   ROM Restrictions No   Oral Hygiene   Type of Assistance Needed Set-up / clean-up   Physical Assistance Level No physical assistance   Comment seated in w/c at sink   Oral Hygiene CARE Score 5   Shower/Bathe Self   Type of Assistance Needed Physical assistance   Physical Assistance Level 26%-50%   Comment Pt requires A for rear hygiene while in stance and BLE distal bathing  Educated on [address] sponge  Shower/Bathe Self CARE Score 3   Bathing   Assessed Bath Style Sponge Bath   Able to Gather/Transport No   Able to [employer] Temperature Yes   Able to Wash/Rinse/Dry (body part) Left Arm;Right Arm;L Upper Leg;R Upper Leg;Chest;Abdomen;Perineal Area   Limitations Noted in Balance; Endurance;Strength   Positioning Seated;Standing   Tub/Shower Transfer   Reason Not Assessed Medical   Upper Body Dressing   Type of Assistance Needed Physical assistance   Physical Assistance Level 26%-50%   Comment Able to thread BUEs and pull over head A to manage over trunk  Upper Body Dressing CARE Score 3   Lower Body Dressing   Type of Assistance Needed Physical assistance   Physical Assistance Level Total assistance   Comment Dependent this session due to fatigue  Lower Body Dressing CARE Score 1   Putting On/Taking Off Footwear   Type of Assistance Needed Physical assistance   Physical Assistance Level Total assistance   Putting On/Taking Off Footwear CARE Score 1   Lying to Sitting on Side of Bed   Type of Assistance Needed Physical assistance; Adaptive equipment;Verbal cues   Physical Assistance Level 25% or less   Comment HOB elevated and use of rails     Lying to Sitting on Side of Bed CARE Score 3   Sit to Stand   Type of Assistance Needed Physical assistance Physical Assistance Level 76% or more   Comment max A x 1   Sit to Stand CARE Score 2   Bed-Chair Transfer   Type of Assistance Needed Physical assistance   Physical Assistance Level Total assistance   Comment modA x 1 sit pivot, dependent for SPT  Chair/Bed-to-Chair Transfer CARE Score 1   Cognition   Overall Cognitive Status WFL   Arousal/Participation Responsive; Cooperative   Attention Attends with cues to redirect   Orientation Level Oriented X4   Memory Within functional limits   Following Commands Follows one step commands with increased time or repetition   Assessment   Treatment Assessment Pt participated in skilled OT services with focus on ADL retraining  Pt limited by dec act julia, dec endurance, dec strength, pain, and requires emotional support  Pt expresses feeling down today in regards to potential IR procedure and frustrated with being made NPO in prep  Pt still very motivated despite this to participate to his tolerance  Pt will continue to benefit from skilled OT services with focus on act julia, endurance, standing tolerance/balance as appropriate  Prognosis Fair   Problem List Decreased strength;Decreased range of motion;Decreased endurance; Impaired balance;Decreased mobility;Pain   Plan   Treatment/Interventions ADL retraining;Functional transfer training; Therapeutic exercise; Endurance training;Patient/family training;Equipment eval/education; Bed mobility; Compensatory technique education   Progress Progressing toward goals   Recommendation   OT Discharge Recommendation   (pending progress)   OT Therapy Minutes   OT Time In 1000   OT Time Out 1130   OT Total Time (minutes) 90   OT Mode of treatment - Individual (minutes) 90   OT Mode of treatment - Concurrent (minutes) 0   OT Mode of treatment - Group (minutes) 0   OT Mode of treatment - Co-treat (minutes) 0   OT Mode of Treatment - Total time(minutes) 90 minutes   OT Cumulative Minutes 360   Therapy Time missed   Time missed?  No

## 2022-12-19 NOTE — PROGRESS NOTES
PM&R PROGRESS NOTE:  Dahiana Knutson 61 y o  male MRN: [id number]  Unit/Bed#: -75 Encounter: [id number]        Rehabilitation Diagnosis: Impairment of mobility, safety and Activities of Daily Living (ADLs) due to Neurologic Conditions:  03 8  Neuromuscular Disorders  Etiology: Critical Illness Myopathy    HPI: Deedee Baxter is a 61 y o  male with a medical history of HTN, HLD, GERD, and ventral hernia that presented to [facility] on 11/7 for an elective surgical procedure due to metastatic colon adenocarcinoma by Dr Netta Mckenzie  [facility] showed transverse colonic apple core lesion and inumberable hepatic metastases  MRI revealed multiple hepatic metastasis with smaller lesions in left lobe and larger lesions in right lobe  Patient completed chemotherapy, colostomy at that time  On 11/7 patient underwent exploratory laparotomy, segment 3 liver resection, ablation, intraoperative ultrasound, and colostomy reversal  This was c/b left upper quadrant hematoma, imaging showed suspected leak from transverse colon anastomosis  On 11/16, patient underwent exp  Lap which revealed LUQ infected hematoma, defect in transverse colon anastomosis with extravasation of succus  Massively dilated cecum requiring resection  He had a right hemicolectomy, left in discontinuity and temporary abdominal closure  On 11/17, re-exploration, partial descending colectomy, primary colonic anastomosis created with diverting loop ileostomy and midline wound VAC placement  He completed course of IV abx for ESBL bacteremia from abdominal abscess  Midline abdominal incision wet to dry dressings and packing to old stoma site  12/6 abdominal incision noted to have yellow drainage  CT abdomen showed abdominal abscess and distended gallbladder  Patient underwent percutaneous cholecystostomy tube insertion and hepatectomy abscess drainage  ID consulted, IV abx for 1 week   Nephrology consulted d/t ALEXY, creatinine baseline 1 2-1 4  On 12/14, patient experienced chest pain with movement  Cardiology evaluated; troponin level 57; chest pain appeared musculoskeletal with no further work up  Patient deemed medically stable and admitted to [facility] on 12/14/2022  SUBJECTIVE: Patient seen face to face  Reports abdominal discomfort 2/10, manageable with current pain regimen  He "feels good today", ate all of breakfast  Simethicone is working to control the bloating after meals  No CP, SOB, fever, chills, N/V/D, abdominal pain  ASSESSMENT: Stable, progressing    PLAN:  1   Hbg 8 7, continue to monitor  2  IR 12/20 to evaluate and place drains for perisplenic hilum collection and splenic recess collection, NPO after midnight, hold subcutaneous heparin after midnight  Rehabilitation  • Functional deficits:    • Continue current rehabilitation plan of care to maximize function      • Functional update:   o PT: mobility- mod A, transfers- total assist (mod A x1 with 2nd standby)  o OT: ADL- Max A, UB dressing- Mod A, LB dressing- total A; toileting- total assist    • Estimated Discharge: ADD 3 weeks    Pain  • Tylenol, oxycodone    DVT prophylaxis  • Heparin sc    Bladder plan  • Continent, urinal    Bowel plan  • ileostomy      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)  Assessment & Plan  · MRI-multiple hepatic metastasis; smaller lesions in left lobe, larger lesions in right lobe  · 11/7 Exp Lap, resection of hepatic segment 3, resection of transverse colon with reversal of loop colostomy (Dr Laurie Reese)  · 11/16- right hemicolectomy, left discontinuity and temporary abdominal closure device placed  · 11/17- re-exploration, partial descending colectomy, primary colocolonic anastomosis with diverting loop ileostomy, midline VAC placement  · Abdominal incision- yellow drainage; CT showed abdominal abscess and distended gallbladder  · 12/8- IR percutaneous cholecystostomy tube, hepatectomy abscess drain placement  · Monitor incisions, dressings  · Monitor CBC, CMP  · IM consulted for assistance with management  · Follow-up outpatient with hem/onc    * Malignant neoplasm of transverse colon [facility])  Assessment & Plan  · Transverse colonic apple core lesion and innumerable hepatic metastases  · Colostomy 2/22  · RCW port-a-cath, accessed  · S/p palliative chemo  · Follows with Dr Tila Stone (palliative)  · Follows hem/onc (Dr Robert Allen)    Sepsis [facility])  Assessment & Plan  · SIRS versus sepsis at this time given his vitals, leukocytosis, and complicated infection history  · Had finished his course of Zosyn on 1212 and ertapenem by 1215  · Consultation to infectious disease, appreciate their recommendations  · Drawing blood cultures x2 restarting his ertapenem and obtaining a CT abdomen and pelvis at this time  · Continued work-up and treatment here on [facility] at this time however if clinically deteriorates or unable to participate in therapy will likely need transfer back to acute care    Acute kidney injury [facility])  Assessment & Plan  · Creatinine baseline 1 2-1 4  · Current creatinine 1 38  · Consider consult nephrology  · Monitor BMP    Bacteremia  Assessment & Plan  · Abdominal abscess- ESBL E Coli  · Zosyn/Ertapenam 7 days (Ertapenem completed 12/15, Zosyn 12/12)  · Contact precautions  · VICTORINO drain- milky drainage  · Monitor CBC    Iron deficiency anemia, unspecified  Assessment & Plan  · Hbg 8 7 (previously 9 3, 8 0,7 3)  · Consent obtained  · 12/15-1 unit PRBc  · Monitor CBC  · 12/16- symptomatic- 1 unit PRBc    Obesity (BMI 30-39  9)  Assessment & Plan  · BMI 33 0  · Diet and lifestyle modifications  · Nutrition consult    Benign essential hypertension  Assessment & Plan  · Home: Lisinopril  · Here: Norvasc 5 mg BID  · Adjust medication as needed  · IM consult for assistance with management  · Monitored outpatient by PCP    Type 2 diabetes mellitus without complication, with long-term current use of insulin [facility])  Assessment & Plan  · HbgA1c 8 0 (9/22)  · Home monitoring with Saeed Cavazos  · Home: Lantus 12 units, Humalog 3 units, Januvia  · Here: Humalog 3 units with meals, SSI, Lantus   · Follow with PCP (Dr Reggie Manning)      [address] [facility]  Labs, medications, and imaging reviewed  ROS:  Review of Systems   A 10 point review of systems was negative except for what is noted in the HPI  OBJECTIVE:   /81   Pulse 94   Temp 98 1 °F (36 7 °C) (Oral)   Resp 16   Ht 5' 10" (1 778 m)   Wt 104 kg (230 lb)   SpO2 95%   BMI 33 00 kg/m²     Physical Exam  Constitutional:       Appearance: He is obese  HENT:      Head: Normocephalic and atraumatic  Mouth/Throat:      Mouth: Mucous membranes are moist    Cardiovascular:      Rate and Rhythm: Normal rate and regular rhythm  Pulses: Normal pulses  Heart sounds: Normal heart sounds  Pulmonary:      Effort: Pulmonary effort is normal    Abdominal:      General: Bowel sounds are normal       Palpations: Abdomen is soft  Tenderness: There is abdominal tenderness  Comments: +ileostomy, +right cholecystectomy drain, +left VICTORINO drain   Musculoskeletal:         General: Normal range of motion  Cervical back: Normal range of motion  Skin:     General: Skin is warm and dry  Capillary Refill: Capillary refill takes less than 2 seconds  Neurological:      Mental Status: He is alert and oriented to person, place, and time  Motor: Weakness present     Psychiatric:         Mood and Affect: Mood normal          Judgment: Judgment normal        Lab Results   Component Value Date    WBC 12 82 (H) 12/19/2022    HGB 8 7 (L) 12/19/2022    HCT 27 2 (L) 12/19/2022    MCV 86 12/19/2022     12/19/2022     Lab Results   Component Value Date    SODIUM 136 12/19/2022    K 3 5 12/19/2022     12/19/2022    CO2 24 12/19/2022    BUN 16 12/19/2022    CREATININE 1 19 12/19/2022    GLUC 144 (H) 12/19/2022    CALCIUM 8 4 12/19/2022     Lab Results   Component Value Date    INR 1 12 12/19/2022    INR 1 10 11/12/2022    INR 1 24 (H) 11/09/2022    PROTIME 14 7 (H) 12/19/2022    PROTIME 14 4 11/12/2022    PROTIME 15 8 (H) 11/09/2022       Current Facility-Administered Medications:   •  acetaminophen (TYLENOL) tablet 975 mg, 975 mg, Oral, Q8H [address], GAURAGN Keith, 975 mg at 12/19/22 1306  •  amLODIPine (NORVASC) tablet 5 mg, 5 mg, Oral, BID, GAURANG Keith, 5 mg at 12/19/22 7381  •  aspirin chewable tablet 81 mg, 81 mg, Oral, Daily, GAURANG Keith, 81 mg at 12/19/22 0817  •  atorvastatin (LIPITOR) tablet 40 mg, 40 mg, Oral, Daily, GAURANG Keith, 40 mg at 12/19/22 0816  •  calcium carbonate (TUMS) chewable tablet 500 mg, 500 mg, Oral, BID PRN, GAURANG Keith, 500 mg at 12/16/22 8589  •  cholecalciferol (VITAMIN D3) tablet 400 Units, 400 Units, Oral, Daily, GAURANG Keith, 400 Units at 12/19/22 0819  •  ertapenem (INVanz) 1,000 mg in sodium chloride 0 9 % 50 mL IVPB, 1,000 mg, Intravenous, Q24H, Pillo Ware MD, Last Rate: 100 mL/hr at 12/18/22 1224, 1,000 mg at 12/18/22 1224  •  heparin (porcine) subcutaneous injection 5,000 Units, 5,000 Units, Subcutaneous, Q8H [address], GAURANG Keith, 5,000 Units at 12/19/22 0527  •  insulin glargine (LANTUS) subcutaneous injection 12 Units 0 12 mL, 12 Units, Subcutaneous, HS, GAURANG Keith, 12 Units at 12/18/22 2134  •  insulin lispro (HumaLOG) 100 units/mL subcutaneous injection 1-5 Units, 1-5 Units, Subcutaneous, HS, GAURANG Keith, 1 Units at 12/18/22 2135  •  insulin lispro (HumaLOG) 100 units/mL subcutaneous injection 1-6 Units, 1-6 Units, Subcutaneous, TID AC, 1 Units at 12/19/22 1310 **AND** Fingerstick Glucose (POCT), , , TID AC, GAURANG Keith  •  insulin lispro (HumaLOG) 100 units/mL subcutaneous injection 6 Units, 6 Units, Subcutaneous, TID With Meals, GAURANG Rosa, 6 Units at 12/19/22 1311  •  iohexol (OMNIPAQUE) 240 MG/ML solution 50 mL, 50 mL, Oral, 90 min pre-procedure, Nando Frost MD  •  melatonin tablet 3 mg, 3 mg, Oral, HS, GAURANG Keith, 3 mg at 12/18/22 2133  •  methocarbamol (ROBAXIN) tablet 500 mg, 500 mg, Oral, BID, GAURANG Keith, 500 mg at 12/19/22 0817  •  multivitamin-minerals (CENTRUM) tablet 1 tablet, 1 tablet, Oral, Daily, GAURANG Keith, 1 tablet at 12/19/22 0816  •  ondansetron (ZOFRAN-ODT) dispersible tablet 4 mg, 4 mg, Oral, Q6H PRN, GAURANG Keith, 4 mg at 12/17/22 0851  •  oxyCODONE (ROXICODONE) immediate release tablet 10 mg, 10 mg, Oral, Q6H PRN, GAURANG Keith, 10 mg at 12/18/22 1449  •  oxyCODONE (ROXICODONE) IR tablet 5 mg, 5 mg, Oral, Q6H PRN, GAURANG Keith  •  pantoprazole (PROTONIX) EC tablet 40 mg, 40 mg, Oral, BID AC, GAURANG Keith, 40 mg at 12/19/22 9005  •  simethicone (MYLICON) chewable tablet 80 mg, 80 mg, Oral, 4x Daily (with meals and at bedtime), GAURANG Keith, 80 mg at 12/19/22 1306  •  tamsulosin (FLOMAX) capsule 0 4 mg, 0 4 mg, Oral, Daily With Dinner, GAURANG Canas, 0 4 mg at 12/18/22 1726    Past Medical History:   Diagnosis Date   • Abdominal pain 03/12/2022   • Acute renal failure ([place] [address] )     [id number] resolved   • Cancer ([facility] )    • Diabetes mellitus ([facility] )    • Enteritis 08/23/2016   • Gastroparesis due to DM ([facility] ) 08/23/2016   • GERD (gastroesophageal reflux disease)    • Hernia, ventral 08/04/2016   • Hyperlipidemia    • Hypertension    • Morbid obesity ([facility] ) 04/17/2018   • Postoperative visit 03/02/2022   • SIRS (systemic inflammatory response syndrome) ([facility] ) 03/12/2022   • Snoring    • Stage 3a chronic kidney disease ([facility] ) 02/19/2022       Patient Active Problem List    Diagnosis Date Noted   • 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 liver ([facility] ) 03/02/2022   • Malignant neoplasm of transverse colon ([facility] ) 03/01/2022   • Sepsis ([place] [address] ) 12/17/2022   • Severe protein-calorie malnutrition ([facility] ) 12/14/2022   • Acute kidney injury ([place] [address] ) 12/14/2022   • Flash pulmonary edema ([facility] ) 11/12/2022   • MR (mitral regurgitation) 11/12/2022   • Bacteremia 11/12/2022   • ESBL (extended spectrum beta-lactamase) producing bacteria infection 11/12/2022   • Acute respiratory failure with hypoxia ([facility] ) 11/12/2022   • Encephalopathy 11/09/2022   • Cervical radiculopathy 10/26/2022   • Other fatigue 06/15/2022   • Colostomy prolapse ([facility] ) 05/27/2022   • Colon cancer metastasized to liver ([place] [address] ) 03/12/2022   • Iron deficiency anemia, unspecified 03/01/2022   • Thrombocytosis 02/21/2022   • Hypokalemia 02/19/2022   • Transaminitis 02/19/2022   • Type 2 diabetes mellitus with hyperlipidemia ([place] [address] ) 02/18/2022   • Colonic mass 02/18/2022   • Microcytic anemia 02/18/2022   • Left ureteral calculus 01/30/2020   • Incarcerated umbilical hernia 49/03/2279   • Testicular hypogonadism 06/19/2017   • Low testosterone 05/30/2017   • Type 2 diabetes mellitus without complication, with long-term current use of insulin ([place] [address] ) 08/23/2016   • Benign essential hypertension 08/23/2016   • Mixed hyperlipidemia 08/23/2016   • Erectile dysfunction 07/11/2016   • Obesity (BMI 30-39 9) 07/11/2016      GAURANG Douglass  Physical Medicine and Chris Luu  Total visit time: 30 minutes, with more than 50% spent counseling/coordinating care  Counseling includes discussion with patient re: progress in therapies, functional issues observed by therapy staff, and discussion with patient regarding their current medical state and wellbeing  Coordination of patient's care was performed in conjunction with Internal Medicine service to monitor patient's labs, vitals, and management of their comorbidities

## 2022-12-19 NOTE — PROGRESS NOTES
Progress Note - Infectious Disease   Emilee Mccartney 61 y o  male MRN: [id number]  Unit/Bed#: -01 Encounter: [id number]      Impression/Recommendations:  Sepsis     Evolving 12/17:  WBC, HR  Suspect due to persistent left intra-abdominal infection in setting of complex history below, recently completed antibiotics   ROS and exam otherwise negative   Recent Flu/RSV/COVID PCR, CXR negative  Blood cultures negative  Subjectively improved with reinitiation of antibiotics  Rec:  • Continue ertapenem for now  • Follow temperatures and WBC closely  • IR evaluation for additional drain placement as below  • Supportive care as per the primary service     Recent intra-abdominal abscess     In the setting complex surgical history as below   Initially developed 11/2022 companied by ESBL E  coli bacteremia   Status post exploratory laparotomy, right hemicolectomy, temporary abdominal closure 11/16; re-exploration, partial left colectomy, primary ileocolonic anastomosis with proximal diverting loop ileostomy, midline fascial closure on 11/17   More recently developed abscess in hepatectomy bed and collection +/- leak at area of prior colonic anastamosis, 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   Status post IR drain into perihepatic collection 12/8   Pericolonic area not accessed   Cultures with ESBL E  Coli   Status post 7 days ertapenem after drain placement through 12/15  Repeat CT 12/17 shows hepatic bed collection improved, persistent left intra-abdominal collection  Rec:  • Continue antibiotics as above  • Continue hepatic bed drain  • IR to attempt additional drain placement  • May need more prolonged course of antibiotics - PO doxycycline an eventual option     Possible acute cholecystitis     Status post percutaneous cholecystostomy tube    Alk phos remains markedly elevated, possibly due to drain itself versus known intrahepatic metastases      Metastatic colon cancer   To liver, possible lung   Status post resection, chemotherapy, more recent hepatic resection and ablation, transverse colon resection      CKD   Baseline Cr 1 4     DM      Anemia   Status post multiple transfusions      The above impression and plan was discussed in detail with the patient      Antibiotics:  Ertapenem #3    Subjective:  Patient seen on AM rounds  Tired but overall feeling better  Worked with PT yesterday  Denies fevers, chills, sweats, nausea, vomiting, or diarrhea  24 Hour Events:  No documented fevers, chills, sweats, nausea, vomiting, or diarrhea  Objective:  Vitals:  Temp:  [98 °F (36 7 °C)-98 7 °F (37 1 °C)] 98 °F (36 7 °C)  HR:  [83-94] 90  Resp:  [16-19] 17  BP: (137-157)/(78-85) 157/85  SpO2:  [93 %-98 %] 98 %  Temp (24hrs), Av 3 °F (36 8 °C), Min:98 °F (36 7 °C), Max:98 7 °F (37 1 °C)  Current: Temperature: 98 °F (36 7 °C)    Physical Exam:   General:  No acute distress  Psychiatric:  Awake and alert  Pulmonary:  Normal respiratory excursion without accessory muscle use  Abdomen:  Soft, nontender  Extremities:  No edema  Skin:  No rashes    Lab Results:  I have personally reviewed pertinent labs  Results from last 7 days   Lab Units 22  0536 22  1300 22  1133 22  1136 12/15/22  0516   POTASSIUM mmol/L 3 5 3 7  --   --  3 9   CHLORIDE mmol/L 105 104  --   --  102   CO2 mmol/L 24 25  --   --  26   BUN mg/dL 16 14  --   --  15   CREATININE mg/dL 1 19 1 37*  --   --  1 38*   EGFR ml/min/1 73sq m 66 56  --   --  55   CALCIUM mg/dL 8 4 8 4  --   --  8 4   AST U/L 61* 86* 77*   < >  --    ALT U/L 19 19 17   < >  --    ALK PHOS U/L 989* 1,185* 1,060*   < >  --     < > = values in this interval not displayed       Results from last 7 days   Lab Units 22  0536 22  0632 22  0443   WBC Thousand/uL 12 82* 18 24* 10 52*   HEMOGLOBIN g/dL 8 7* 9 3* 8 0*   PLATELETS Thousands/uL 332 294 300     Results from last 7 days   Lab Units 22  1312 22  1234   BLOOD CULTURE  No Growth at 24 hrs  No Growth at 24 hrs  Imaging Studies:   I have personally reviewed pertinent imaging study reports and images in PACS  EKG, Pathology, and Other Studies:   I have personally reviewed pertinent reports

## 2022-12-19 NOTE — CONSULTS
Interventional Radiology  Consultation 12/19/2022     IR Consult-(For Para, Randa Charm, LP, PICC(for GFR>/=45) use the specific ordersets  Consult performed by: Flynn Acevedo PA-C  Consult ordered by: NANETTE Malone   1965   [id number]      Assessment/Plan:  58-year-old male with complex medical and surgical history due to metastatic colon cancer  Patient is recently status post cholecystostomy drain placement  He is also status post ex lap with transverse colon resection and segment 3 liver resection, colostomy reversal   Then also status post subsequent exploratory laparotomy with bowel resection and ileostomy formation  Surgical course is further complicated by intra-abdominal collections  On recent CT imaging patient now demonstrates a enlarging loculated collection along the splenic recess of the lesser sac and perisplenic collection in the hilum along the inferior pole  1  Perisplenic hilum collection  -Tentative plan for drain placement tomorrow  -N p o  midnight  -Hold anticoagulation prior to procedure  -Imaging reviewed with Dr Harriett Donaldson and Dr Izabel Cervantes    2  Splenic recess collection  -Tentative plan for drain placement tomorrow  -N p o  midnight  -Hold anticoagulation prior to procedure  -Imaging reviewed with Dr Harriett Donaldson and Dr Izabel Cervantes    3  Cholecystostomy drain  -20 mL out over last 24 hours  -Tube to remain in place  -Continue care as ordered    4  Midline abdominal abscess drain  -40 mL out over last 24 hours, purulent drainage  -Tube to remain in place  -Continue care as ordered    Case and imaging reviewed with Dr Harriett Donaldson and Dr Izabel Cervantes  Interdisciplinary team meeting held at patient's bedside with patient's brother Jah Sullivan on phone, Dr Darcey Lennox of surgery, Dr Harriett Donaldson of interventional radiology and Dr Betito Baig of PMR and myself to answer patient questions and  him regarding expectations of procedure and further hospital course       Medical Problems     Problem List     * (Principal) Malignant neoplasm of transverse colon ([facility] [address] )    Type 2 diabetes mellitus without complication, with long-term current use of insulin ([facility] [address] )    Lab Results   Component Value Date    HGBA1C 8 0 (H) 09/26/2022       Recent Labs     12/18/22  1547 12/18/22  2045 12/19/22  0655 12/19/22  1111   POCGLU 176* 189* 127 161*       Blood Sugar Average: Last 72 hrs:  (P) 186 125        Benign essential hypertension    Mixed hyperlipidemia    Erectile dysfunction    Low testosterone    Obesity (BMI 30-39  9)    Testicular hypogonadism    Incarcerated umbilical hernia    Overview Signed 3/26/2019  2:10 PM by Deonte Dennis MD     Added automatically from request for surgery [id number]         Left ureteral calculus    Type 2 diabetes mellitus with hyperlipidemia [facility])    Lab Results   Component Value Date    HGBA1C 8 0 (H) 09/26/2022       Recent Labs     12/18/22  1547 12/18/22  2045 12/19/22  0655 12/19/22  1111   POCGLU 176* 189* 127 161*       Blood Sugar Average: Last 72 hrs:  (P) 186 125        Colonic mass    Microcytic anemia    Hypokalemia    Transaminitis    Thrombocytosis    Iron deficiency anemia, unspecified    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)    Colon cancer metastasized to liver (HCC)    Colostomy prolapse (HCC)    Other fatigue    Cervical radiculopathy    Encephalopathy    Flash pulmonary edema (HCC)    MR (mitral regurgitation)    Bacteremia    ESBL (extended spectrum beta-lactamase) producing bacteria infection    Acute respiratory failure with hypoxia (HCC)    Severe protein-calorie malnutrition ([address] )    Malnutrition Findings:                                 BMI Findings: Body mass index is 33 kg/m²  Acute kidney injury ([address] )    Sepsis ([address] )            Subjective: Today the patient is encountered while he is finishing up physical therapy  Patient reports that he is gradually beginning to feel stronger and regaining some mobility  Overall his appearance is improved from prior encounters  Patient ID: Steffanie Russo is a 61 y o  male  History of Present Illness  19-year-old male with complex medical and surgical history due to metastatic colon cancer  Patient is recently status post cholecystostomy drain placement  He is also status post ex lap with transverse colon resection and segment 3 liver resection, colostomy reversal   Then also status post subsequent exploratory laparotomy with bowel resection and ileostomy formation  Surgical course is further complicated by intra-abdominal collections  On recent CT imaging patient now demonstrates a enlarging loculated collection along the splenic recess of the lesser sac and perisplenic collection in the hilum along the inferior pole  Review of Systems   Constitutional: Positive for diaphoresis and fatigue  Negative for chills and fever  HENT: Negative for sinus pressure, sinus pain, sneezing and sore throat  Gastrointestinal: Positive for abdominal pain (mild general)  Negative for blood in stool and constipation  Genitourinary: Negative for dysuria and flank pain  Musculoskeletal: Negative for arthralgias and myalgias  All other systems reviewed and are negative          Past Medical History:   Diagnosis Date   • Abdominal pain 03/12/2022   • Acute renal failure ([place] [address] )     [id number] resolved   • Cancer ([place] [address] )    • Diabetes mellitus ([place] [address] )    • Enteritis 08/23/2016   • Gastroparesis due to DM ([facility] ) 08/23/2016   • GERD (gastroesophageal reflux disease)    • Hernia, ventral 08/04/2016   • Hyperlipidemia    • Hypertension    • Morbid obesity ([facility] ) 04/17/2018   • Postoperative visit 03/02/2022   • SIRS (systemic inflammatory response syndrome) ([place] [address] ) 03/12/2022   • Snoring    • Stage 3a chronic kidney disease ([place] [address] ) 02/19/2022        Past Surgical History:   Procedure Laterality Date   • COLONOSCOPY     • COLOSTOMY N/A 02/20/2022    Procedure: COLOSTOMY LOOP, diverting;  Surgeon: Carlos Stephen MD;  [facility]: [facility];  Service: General   • ESOPHAGOGASTRODUODENOSCOPY N/A 08/24/2016    Procedure: ESOPHAGOGASTRODUODENOSCOPY (EGD); Surgeon: Chris Borjas MD;  Location: [facility];   Service:    • EXPLORATORY LAPAROTOMY W/ BOWEL RESECTION N/A 11/16/2022    Procedure: LAPAROTOMY EXPLORATORY W/ BOWEL RESECTION- VAC PLACEMENT;  Surgeon: Latosha Holm MD;  Location: [facility];  Service: Surgical Oncology   • EXPLORATORY LAPAROTOMY W/ BOWEL RESECTION N/A 11/17/2022    Procedure: LAPAROTOMY EXPLORATORY W/ BOWEL RESECTION;  Surgeon: Latosha Holm MD;  [facility];  Service: Surgical Oncology   • ILEOSTOMY N/A 11/17/2022    Procedure: ILEOSTOMY;  Surgeon: Latosha Holm MD;  [facility];  Service: Surgical Oncology   • ILEOSTOMY CLOSURE N/A 11/7/2022    Procedure: REVERSAL COLOSTOMY;  Surgeon: Jeremy Fernando MD;  [facility];  Service: Surgical Oncology   • IR CHOLECYSTOSTOMY TUBE CHECK/CHANGE/REPOSITION/REINSERTION/UPSIZE  12/12/2022   • IR CHOLECYSTOSTOMY TUBE PLACEMENT  12/8/2022   • IR DRAINAGE TUBE PLACEMENT  12/8/2022   • IR PORT PLACEMENT  03/10/2022   • KIDNEY STONE SURGERY     • LIVER BIOPSY LAPAROSCOPIC N/A 02/20/2022    Procedure: DIAGNOSTIC LAPAROSCOPIC LIVER BIOPSY, DIVERTING LOOP COLOSTOMY ;  Surgeon: Carlos Stephen MD;  [facility];  Service: General   • LIVER LOBECTOMY N/A 11/7/2022    Procedure: SEGMENT 3 LIVER RESECTION, ABLATION, INTRAOPERATIVE U/S OF LIVER, PERITONEAL BIOPSY;  Surgeon: Jeremy Fernando MD;  [facility];  Service: Surgical Oncology   • RIGHT COLON RESECTION N/A 11/7/2022    Procedure: EX LAP, TRANVERSE COLON RESECTION;  Surgeon: Jeremy Fernando MD;  [facility];  Service: Surgical Oncology   • TONSILLECTOMY     • UMBILICAL HERNIA REPAIR LAPAROSCOPIC N/A 04/26/2019    Procedure: LAPAROSCOPIC UMBILICAL HERNIA REPAIR;  Surgeon: Carlos Stephen MD;  [facility];  Service: General   • VAC DRESSING APPLICATION N/A 31/02/9497    Procedure: APPLICATION VAC DRESSING ABDOMEN/TRUNK;  Surgeon: Adonay Hill MD;  [facility];  Service: Surgical Oncology        Social History     Tobacco Use   Smoking Status Never   Smokeless Tobacco Never        Social History     Substance and Sexual Activity   Alcohol Use Never        Social History     Substance and Sexual Activity   Drug Use No        Allergies   Allergen Reactions   • Shellfish-Derived Products - Food Allergy Anaphylaxis   • Erythromycin GI Intolerance       Current Facility-Administered Medications   Medication Dose Route Frequency Provider Last Rate Last Admin   • acetaminophen (TYLENOL) tablet 975 mg  975 mg Oral Q8H [address] GAURANG Keith   975 mg at 12/19/22 0527   • amLODIPine (NORVASC) tablet 5 mg  5 mg Oral BID GAURANG Keith   5 mg at 12/19/22 5435   • aspirin chewable tablet 81 mg  81 mg Oral Daily GAURANG Keith   81 mg at 12/19/22 0817   • atorvastatin (LIPITOR) tablet 40 mg  40 mg Oral Daily GAURANG Keith   40 mg at 12/19/22 0816   • calcium carbonate (TUMS) chewable tablet 500 mg  500 mg Oral BID PRN GAURANG Coello   500 mg at 12/16/22 0836   • cholecalciferol (VITAMIN D3) tablet 400 Units  400 Units Oral Daily GAURANG Keith   400 Units at 12/19/22 0819   • ertapenem (INVanz) 1,000 mg in sodium chloride 0 9 % 50 mL IVPB  1,000 mg Intravenous Q24H Maryam Barajas  mL/hr at 12/18/22 1224 1,000 mg at 12/18/22 1224   • heparin (porcine) subcutaneous injection 5,000 Units  5,000 Units Subcutaneous Q8H [address] GAURANG Keith   5,000 Units at 12/19/22 0527   • insulin glargine (LANTUS) subcutaneous injection 12 Units 0 12 mL  12 Units Subcutaneous HS GAURANG Keith   12 Units at 12/18/22 2134   • insulin lispro (HumaLOG) 100 units/mL subcutaneous injection 1-5 Units  1-5 Units Subcutaneous HS GAURANG Keith   1 Units at 12/18/22 2135   • insulin lispro (HumaLOG) 100 units/mL subcutaneous injection 1-6 Units  1-6 Units Subcutaneous TID AC GAURANG Keith   3 Units at 12/18/22 1223   • insulin lispro (HumaLOG) 100 units/mL subcutaneous injection 6 Units  6 Units Subcutaneous TID With Meals GAURANG Marcos       • iohexol (OMNIPAQUE) 240 MG/ML solution 50 mL  50 mL Oral 90 min pre-procedure Bernie Tenorio MD       • melatonin tablet 3 mg  3 mg Oral HS GAURANG Keith   3 mg at 12/18/22 2133   • methocarbamol (ROBAXIN) tablet 500 mg  500 mg Oral BID GAURANG Keith   500 mg at 12/19/22 8241   • multivitamin-minerals (CENTRUM) tablet 1 tablet  1 tablet Oral Daily GAURANG Keith   1 tablet at 12/19/22 0816   • ondansetron (ZOFRAN-ODT) dispersible tablet 4 mg  4 mg Oral Q6H PRN GAURANG Rodgers   4 mg at 12/17/22 1798   • oxyCODONE (ROXICODONE) immediate release tablet 10 mg  10 mg Oral Q6H PRN GAURANG Rodgers   10 mg at 12/18/22 1449   • oxyCODONE (ROXICODONE) IR tablet 5 mg  5 mg Oral Q6H PRN GAURANG Keith       • pantoprazole (PROTONIX) EC tablet 40 mg  40 mg Oral BID  GAURANG Keith   40 mg at 12/19/22 0743   • potassium chloride (K-DUR,KLOR-CON) CR tablet 40 mEq  40 mEq Oral Once GAURANG Acevedo       • simethicone (MYLICON) chewable tablet 80 mg  80 mg Oral 4x Daily (with meals and at bedtime) GAURANG Rodgers   80 mg at 12/19/22 0817   • tamsulosin (FLOMAX) capsule 0 4 mg  0 4 mg Oral Daily With [place] GAURANG Ferguson   0 4 mg at 12/18/22 1726          Objective:    Vitals:    12/18/22 1344 12/18/22 2107 12/19/22 0500 12/19/22 0817   BP: 144/72 146/78 137/79 145/81   BP Location: Left arm Left arm Left arm    Pulse: 84 83 94    Resp: 16 19 16    Temp: 98 7 °F (37 1 °C) 98 7 °F (37 1 °C) 98 1 °F (36 7 °C)    TempSrc: Oral Oral Oral    SpO2: 99% 93% 95%    Weight:       Height:            Physical Exam  Vitals and nursing note reviewed  Constitutional:       Appearance: He is well-developed, well-groomed and overweight   He is not toxic-appearing  HENT:      Head: Normocephalic and atraumatic  Right Ear: External ear normal       Left Ear: External ear normal       Nose: Nose normal       Mouth/Throat:      Mouth: Mucous membranes are moist    Eyes:      Conjunctiva/sclera: Conjunctivae normal    Cardiovascular:      Rate and Rhythm: Normal rate and regular rhythm  Pulmonary:      Effort: Pulmonary effort is normal       Breath sounds: Normal breath sounds  Abdominal:      General: The ostomy site is clean  Bowel sounds are normal       Palpations: Abdomen is soft  Tenderness: There is abdominal tenderness (mild general)  Comments: Midline surgical site compatible with recent surgical history  Superior to surgical site is midline abdominal abscess drain  Cholecystostomy tube in place  Left-sided abdominal ileostomy in place  Sites appear well  Drains appear to be functioning well  Skin:     Capillary Refill: Capillary refill takes less than 2 seconds  Neurological:      General: No focal deficit present  Mental Status: He is alert and oriented to person, place, and time  Psychiatric:         Behavior: Behavior is cooperative  No results found for: BNP   Lab Results   Component Value Date    WBC 12 82 (H) 12/19/2022    HGB 8 7 (L) 12/19/2022    HCT 27 2 (L) 12/19/2022    MCV 86 12/19/2022     12/19/2022     Lab Results   Component Value Date    INR 1 12 12/19/2022    INR 1 10 11/12/2022    INR 1 24 (H) 11/09/2022    PROTIME 14 7 (H) 12/19/2022    PROTIME 14 4 11/12/2022    PROTIME 15 8 (H) 11/09/2022     Lab Results   Component Value Date    PTT 37 11/09/2022         I have personally reviewed pertinent imaging and laboratory results  Code Status: Level 1 - Full Code  Advance Directive and Living Will:      Power of :    POLST:        Thank you for allowing me to participate in the care of Raissa Petty   Please don't hesitate to call, text, email, or TigerText with any questions

## 2022-12-19 NOTE — PLAN OF CARE
Problem: Prexisting or High Potential for Compromised Skin Integrity  Goal: Skin integrity is maintained or improved  Description: INTERVENTIONS:  - Identify patients at risk for skin breakdown  - Assess and monitor skin integrity  - Assess and monitor nutrition and hydration status  - Monitor labs   - Assess for incontinence   - Turn and reposition patient  - Assist with mobility/ambulation  - Relieve pressure over bony prominences  - Avoid friction and shearing  - Provide appropriate hygiene as needed including keeping skin clean and dry  - Evaluate need for skin moisturizer/barrier cream  - Collaborate with interdisciplinary team   - Patient/family teaching  - Consider wound care consult   Outcome: Progressing     Problem: Potential for Falls  Goal: Patient will remain free of falls  Description: INTERVENTIONS:  - Educate patient/family on patient safety including physical limitations  - Instruct patient to call for assistance with activity   - Consult OT/PT to assist with strengthening/mobility   - Keep Call bell within reach  - Keep bed low and locked with side rails adjusted as appropriate  - Keep care items and personal belongings within reach  - Initiate and maintain comfort rounds  - Make Fall Risk Sign visible to staff  - Offer Toileting every 2 Hours, in advance of need  - Initiate/Maintain alarm  - Obtain necessary fall risk management equipment:   - Apply yellow socks and bracelet for high fall risk patients  - Consider moving patient to room near nurses station  Outcome: Progressing     Problem: PAIN - ADULT  Goal: Verbalizes/displays adequate comfort level or baseline comfort level  Description: Interventions:  - Encourage patient to monitor pain and request assistance  - Assess pain using appropriate pain scale  - Administer analgesics based on type and severity of pain and evaluate response  - Implement non-pharmacological measures as appropriate and evaluate response  - Consider cultural and social influences on pain and pain management  - Notify physician/advanced practitioner if interventions unsuccessful or patient reports new pain  Outcome: Progressing     Problem: INFECTION - ADULT  Goal: Absence or prevention of progression during hospitalization  Description: INTERVENTIONS:  - Assess and monitor for signs and symptoms of infection  - Monitor lab/diagnostic results  - Monitor all insertion sites, i e  indwelling lines, tubes, and drains  - Monitor endotracheal if appropriate and nasal secretions for changes in amount and color  - [place] appropriate cooling/warming therapies per order  - Administer medications as ordered  - Instruct and encourage patient and family to use good hand hygiene technique  - Identify and instruct in appropriate isolation precautions for identified infection/condition  Outcome: Progressing     Problem: SAFETY ADULT  Goal: Patient will remain free of falls  Description: INTERVENTIONS:  - Educate patient/family on patient safety including physical limitations  - Instruct patient to call for assistance with activity   - Consult OT/PT to assist with strengthening/mobility   - Keep Call bell within reach  - Keep bed low and locked with side rails adjusted as appropriate  - Keep care items and personal belongings within reach  - Initiate and maintain comfort rounds  - Make Fall Risk Sign visible to staff  - Offer Toileting every 2 Hours, in advance of need  - Initiate/Maintain alarm  - Obtain necessary fall risk management equipment:   - Apply yellow socks and bracelet for high fall risk patients  - Consider moving patient to room near nurses station  Outcome: Progressing  Goal: Maintain or return to baseline ADL function  Description: INTERVENTIONS:  -  Assess patient's ability to carry out ADLs; assess patient's baseline for ADL function and identify physical deficits which impact ability to perform ADLs (bathing, care of mouth/teeth, toileting, grooming, dressing, etc )  - Assess/evaluate cause of self-care deficits   - Assess range of motion  - Assess patient's mobility; develop plan if impaired  - Assess patient's need for assistive devices and provide as appropriate  - Encourage maximum independence but intervene and supervise when necessary  - Involve family in performance of ADLs  - Assess for home care needs following discharge   - Consider OT consult to assist with ADL evaluation and planning for discharge  - Provide patient education as appropriate  Outcome: Progressing  Goal: Maintains/Returns to pre admission functional level  Description: INTERVENTIONS:  - Perform BMAT or MOVE assessment daily    - Set and communicate daily mobility goal to care team and patient/family/caregiver  - Collaborate with rehabilitation services on mobility goals if consulted  - Perform Range of Motion 3 times a day  - Reposition patient every 2 hours    - Dangle patient 3 times a day  - Stand patient 3 times a day  - Ambulate patient 3 times a day  - Out of bed to chair 3 times a day   - Out of bed for meals 3 times a day  - Out of bed for toileting  - Record patient progress and toleration of activity level   Outcome: Progressing     Problem: DISCHARGE PLANNING  Goal: Discharge to home or other facility with appropriate resources  Description: INTERVENTIONS:  - Identify barriers to discharge w/patient and caregiver  - Arrange for needed discharge resources and transportation as appropriate  - Identify discharge learning needs (meds, wound care, etc )  - Arrange for interpretive services to assist at discharge as needed  - Refer to Case Management Department for coordinating discharge planning if the patient needs post-hospital services based on physician/advanced practitioner order or complex needs related to functional status, cognitive ability, or social support system  Outcome: Progressing     Problem: Nutrition/Hydration-ADULT  Goal: Nutrient/Hydration intake appropriate for improving, restoring or maintaining nutritional needs  Description: Monitor and assess patient's nutrition/hydration status for malnutrition  Collaborate with interdisciplinary team and initiate plan and interventions as ordered  Monitor patient's weight and dietary intake as ordered or per policy  Utilize nutrition screening tool and intervene as necessary  Determine patient's food preferences and provide high-protein, high-caloric foods as appropriate       INTERVENTIONS:  - Monitor oral intake, urinary output, labs, and treatment plans  - Assess nutrition and hydration status and recommend course of action  - Evaluate amount of meals eaten  - Assist patient with eating if necessary   - Allow adequate time for meals  - Recommend/ encourage appropriate diets, oral nutritional supplements, and vitamin/mineral supplements  - Order, calculate, and assess calorie counts as needed  - Recommend, monitor, and adjust tube feedings and TPN/PPN based on assessed needs  - Assess need for intravenous fluids  - Provide specific nutrition/hydration education as appropriate  - Include patient/family/caregiver in decisions related to nutrition  Outcome: Progressing     Problem: MOBILITY - ADULT  Goal: Maintain or return to baseline ADL function  Description: INTERVENTIONS:  -  Assess patient's ability to carry out ADLs; assess patient's baseline for ADL function and identify physical deficits which impact ability to perform ADLs (bathing, care of mouth/teeth, toileting, grooming, dressing, etc )  - Assess/evaluate cause of self-care deficits   - Assess range of motion  - Assess patient's mobility; develop plan if impaired  - Assess patient's need for assistive devices and provide as appropriate  - Encourage maximum independence but intervene and supervise when necessary  - Involve family in performance of ADLs  - Assess for home care needs following discharge   - Consider OT consult to assist with ADL evaluation and planning for discharge  - Provide patient education as appropriate  Outcome: Progressing  Goal: Maintains/Returns to pre admission functional level  Description: INTERVENTIONS:  - Perform BMAT or MOVE assessment daily    - Set and communicate daily mobility goal to care team and patient/family/caregiver  - Collaborate with rehabilitation services on mobility goals if consulted  - Perform Range of Motion 3 times a day  - Reposition patient every 2 hours    - Dangle patient 3 times a day  - Stand patient 3 times a day  - Ambulate patient 3 times a day  - Out of bed to chair 3 times a day   - Out of bed for meals 3 times a day  - Out of bed for toileting  - Record patient progress and toleration of activity level   Outcome: Progressing

## 2022-12-19 NOTE — PROGRESS NOTES
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  • s/p hepatic resection and reversal of colostomy on 11/7  • Return to the OR 11/16 for right hemicolectomy with partial descending colectomy colocolonic anastomosis with loop ileostomy and mid line abdominal VAC placement  • Continue local wound care; WC following  • 12/18:  Ileostomy = 1400ml, radha tube = 20ml, VICTORINO drain = 40ml; U/O = 450  On 12/17, ileostomy output was 2325ml  • D/w [facility] from [facility]  = if ileostomy is >2L for 2 days in a row then to give IVF  • CT  Abd/pelvis 12/17 = loculated collection along splenic recess --> to go to IR 12/20 to see if can be drained     Acute cholecystitis  • s/p percutaneous tube placement 12/8 with tube check on 12/12  • Currently draining bloody discharge  • IR evaluated 12/14 states perc radha tube drainage may remain bloody for several days  • No further abdominal pain  • GI following due to LFT elevation      Bacteremia/abdominal abscess  • ID now following due to worsening leukocytosis and restarted Ertapenem  • Follow-up blood cultures from 12/17 = NG at 24hrs    • CT abdomen and pelvis as above  • VICTORINO drain currently with thick milky drainage     Hypertension  • On Norvasc 5mg BID  • stable     Diabetes mellitus  • On Lantus 12U qhs/Lispro 4U TID  • Increase Lispro to 6U  • Continue diabetic diet and QID Accuchecks with SSI     Acute on chronic renal failure  • Stage III; baseline 1 4  • Lisinopril currently on hold  • Stable today at 1 19     Anemia  • Multifactorial due to recent infection, surgery, CKD stage III  • Transfused on 12/15 for hemoglobin 7 3 and 12/16/22 for hgb of 8   • Hgb currently 8 7      Atypical chest pain  • With elevation in troponin/EKG changes  • Cardiology cleared pt for discharge  • Did not recommend any further work up  • On 12/15 had CP = Cards saw and felt was M/S     Situational depression  • Neuropsychology consulted  • Patient not interested in medications at this point in time     Malnutrition  • Recommend dietician consultation to optimize wound healing  • Glucerna makes him nauseated  • Breads etc make him too full  • On 12/15, ordered double protein  • Ate well for breakfast and lunch     Pleural effusion  • CXR on 12/14 showed small left pleural effusion and was suspect for CHF  • ECHO 11/9/22 = LVEF 55%, unable to assess diastolic function, mild TR  • No SOB and sats are stable on RA  • Will watch for now        Discharge date:  Team       The above assessment and plan was reviewed and updated as determined by my evaluation of the patient on 12/19/2022      Labs:   Results from last 7 days   Lab Units 12/19/22  0536 12/17/22  0632   WBC Thousand/uL 12 82* 18 24*   HEMOGLOBIN g/dL 8 7* 9 3*   HEMATOCRIT % 27 2* 29 2*   PLATELETS Thousands/uL 332 294     Results from last 7 days   Lab Units 12/19/22  0536 12/17/22  1300   SODIUM mmol/L 136 136   POTASSIUM mmol/L 3 5 3 7   CHLORIDE mmol/L 105 104   CO2 mmol/L 24 25   BUN mg/dL 16 14   CREATININE mg/dL 1 19 1 37*   CALCIUM mg/dL 8 4 8 4         Results from last 7 days   Lab Units 12/19/22  0841   INR  1 12     Results from last 7 days   Lab Units 12/19/22  0655 12/18/22  2045 12/18/22  1547   POC GLUCOSE mg/dl 127 189* 176*       Review of Scheduled Meds:  Current Facility-Administered Medications   Medication Dose Route Frequency Provider Last Rate   • acetaminophen  975 mg Oral Q8H [address] GAURANG Keith     • amLODIPine  5 mg Oral BID GAURANG Keith     • aspirin  81 mg Oral Daily GAURANG Keith     • atorvastatin  40 mg Oral Daily GAURANG Keith     • calcium carbonate  500 mg Oral BID PRN Caroleen Alpers, CRNP     • cholecalciferol  400 Units Oral Daily Caroleen Alpers, GAURANG     • ertapenem  1,000 mg Intravenous Q24H Noé Zarco MD 1,000 mg (12/18/22 1224)   • heparin (porcine)  5,000 Units Subcutaneous Q8H [address] GAURANG Keith     • insulin glargine  12 Units Subcutaneous HS GAURANG Keith     • insulin lispro  1-5 Units Subcutaneous HS GAURANG Keith     • insulin lispro  1-6 Units Subcutaneous TID AC GAURANG Keith     • insulin lispro  4 Units Subcutaneous TID With Meals GAURANG Foreman     • iohexol  50 mL Oral 90 min pre-procedure Noé Zarco MD     • melatonin  3 mg Oral HS GAURANG Keith     • methocarbamol  500 mg Oral BID GAURANG Barrow     • multivitamin-minerals  1 tablet Oral Daily GAURANG Keith     • ondansetron  4 mg Oral Q6H PRN GAURANG Barrow     • oxyCODONE  10 mg Oral Q6H PRN GAURANG Keith     • oxyCODONE  5 mg Oral Q6H PRN GAURANG Keith     • pantoprazole  40 mg Oral BID AC GAURANG Keith     • simethicone  80 mg Oral 4x Daily (with meals and at bedtime) GAURANG Barrow     • tamsulosin  0 4 mg Oral Daily With Dinner GAURANG Barrow         Subjective/ HPI: Patient seen and examined  Patients overnight issues or events were reviewed with nursing or staff during rounds or morning huddle session  New or overnight issues include the following:     No new or overnight issues  Offers no complaints    ROS:   A 10 point ROS was performed; negative except as noted above  Imaging:     CT abdomen pelvis w contrast   Final Result by Anushka Frances MD ([id number])      Status post cholecystostomy tube placement with decompression of the gallbladder  In addition, a catheter placed adjacent to the cut edge of the liver is increased position with near complete resolution of fluid collection  Loculated collection along the splenic recess of the lesser sac is unchanged in size  Additional perisplenic collection in the hilum and along the inferior pole are unchanged  Collection of fluid and gas in the left upper quadrant adjacent to left kidney also not significantly changed  No extravasated oral contrast             Workstation performed: [id number]             *Labs /Radiology studies reviewed  *Medications reviewed and reconciled as needed  *Please refer to order section for additional ordered labs studies  *Case discussed with primary attending during morning huddle case rounds    Physical Examination:  Vitals:   Vitals:    12/18/22 1344 12/18/22 2107 12/19/22 0500 12/19/22 0817   BP: 144/72 146/78 137/79 145/81   BP Location: Left arm Left arm Left arm    Pulse: 84 83 94    Resp: 16 19 16    Temp: 98 7 °F (37 1 °C) 98 7 °F (37 1 °C) 98 1 °F (36 7 °C)    TempSrc: Oral Oral Oral    SpO2: 99% 93% 95%    Weight:       Height:           General Appearance: no distress, conversive  HEENT: PERRLA, conjuctiva normal; oropharynx clear; mucous membranes moist   Neck:  Supple, normal ROM  Lungs: CTA, normal respiratory effort, no retractions, expiratory effort normal  CV: regular rate and rhythm; no rubs/murmurs/gallops, PMI normal   ABD: soft; ND/NT; +BS; VICTORINO draining milky tan and per radha tube bloody drainage  Ostomy bag with mushy yellow stool  EXT: no edema  Skin: normal turgor, normal texture, no rashes  Psych: affect normal, mood normal  Neuro: AAO      The above physical exam was reviewed and updated as determined by my evaluation of the patient on 12/19/2022  Invasive Devices     Central Venous Catheter Line  Duration           Port A Cath 03/10/22 Right Chest 283 days          Drain  Duration           Ileostomy LUQ 31 days    Abscess Drain Abdomen 10 days    Cholecystostomy Tube 6 days                   VTE Pharmacologic Prophylaxis: Heparin  Code Status: Level 1 - Full Code  Current Length of Stay: 5 day(s)      Total time spent:  30 minutes with more than 50% spent counseling/coordinating care   Counseling includes discussion with patient re: progress  and discussion with patient of his/her current medical state/information  Coordination of patient's care was performed in conjunction with primary service  Time invested included review of patient's labs, vitals, and management of their comorbidities with continued monitoring  In addition, this patient was discussed with medical team including physician and advanced extenders  The care of the patient was extensively discussed and appropriate treatment plan was formulated unique for this patient  Medical decision making for the day was made by supervising physician unless otherwise noted in their attestation statement  ** Please Note:  voice to text software may have been used in the creation of this document   Although proof errors in transcription or interpretation are a potential of such software**

## 2022-12-19 NOTE — QUICK NOTE
Since NPO after midnight for IR tomorrow, will decrease Lantus for tonight from 12U to 6U and then back up to 12U 12/20 HS

## 2022-12-19 NOTE — QUICK NOTE
Patient restarted on antibiotics, (Ertapenem)  CTAP of 12/17 revealed loculated collection along splenic recess of lesser sac, near complete resolution of liver collection, perisplenic collection along hilum is unchanged  Will order IR consult to see if this collection can be drained

## 2022-12-19 NOTE — PROGRESS NOTES
12/19/22 1430   Pain Assessment   Pain Assessment Tool 0-10   Pain Score No Pain   Restrictions/Precautions   Precautions Fall Risk;Contact/isolation;Multiple lines;Supervision on toilet/commode  (ileostomy, IR drain, VICTORINO drain)   Weight Bearing Restrictions No   ROM Restrictions No   Cognition   Overall Cognitive Status WFL   Arousal/Participation Responsive; Cooperative   Attention Attends with cues to redirect   Orientation Level Oriented X4   Memory Within functional limits   Following Commands Follows one step commands with increased time or repetition   Sit to Lying   Type of Assistance Needed Physical assistance;Verbal cues; Adaptive equipment   Physical Assistance Level 25% or less   Comment A to trunk, HOB elevated, use of bed rails   Sit to Lying CARE Score 3   Lying to Sitting on Side of Bed   Type of Assistance Needed Physical assistance;Verbal cues; Adaptive equipment   Physical Assistance Level 25% or less   Comment A to trunk, HOB elevated, use of bed rails   Lying to Sitting on Side of Bed CARE Score 3   Sit to Stand   Type of Assistance Needed Physical assistance;Verbal cues; Adaptive equipment   Physical Assistance Level 51%-75%   Comment MODA from WC/lower surfaces, MIN/MODA from EOB   Sit to Stand CARE Score 2   Bed-Chair Transfer   Type of Assistance Needed Physical assistance; Adaptive equipment   Physical Assistance Level 26%-50%   Comment MIN/MODA SPT with RW   Chair/Bed-to-Chair Transfer CARE Score 3   Transfer Bed/Chair/Wheelchair   Adaptive Equipment Roller Walker   Car Transfer   Reason if not Attempted Safety concerns   Car Transfer CARE Score 88   Walk 10 Feet   Type of Assistance Needed Physical assistance;Verbal cues; Adaptive equipment   Physical Assistance Level Total assistance   Comment MODA with RW and WC follow for safety     Walk 10 Feet CARE Score 1   Walk 50 Feet with Two Turns   Reason if not Attempted Medical concerns   Walk 50 Feet with Two Turns CARE Score 88   Walk 150 Feet Reason if not Attempted Medical concerns   Walk 150 Feet CARE Score 88   Walking 10 Feet on Uneven Surfaces   Reason if not Attempted Safety concerns   Walking 10 Feet on Uneven Surfaces CARE Score 88   Ambulation   Primary Mode of Locomotion Prior to Admission Walk   Distance Walked (feet) 17 ft  (12')   Assist Device Roller Walker   Gait Pattern Inconsistant Josefina; Slow Josefina;Decreased foot clearance; Forward Flexion;Narrow LUIS;Step to; Improper weight shift   Limitations Noted In Balance;Device Management; Endurance; Heel Strike;Midline Orientation;Posture; Safety;Strength;Speed;Swing   Provided Assistance with: Balance;Trunk Support   Walk Assist Level Chair Follow; Moderate Assist   Does the patient walk? 2  Yes   Wheel 50 Feet with Two Turns   Reason if not Attempted Activity not applicable   Wheel 50 Feet with Two Turns CARE Score 9   Wheel 150 Feet   Reason if not Attempted Activity not applicable   Wheel 818 Feet CARE Score 9   Wheelchair mobility   Does the patient use a wheelchair? 0  No   Curb or Single Stair   Reason if not Attempted Safety concerns   1 Step (Curb) CARE Score 88   4 Steps   Reason if not Attempted Safety concerns   4 Steps CARE Score 88   12 Steps   Reason if not Attempted Safety concerns   12 Steps CARE Score 88   Picking Up Object   Reason if not Attempted Safety concerns   Picking Up Object CARE Score 88   Therapeutic Interventions   Strengthening seated LAQ with 2# BLE 3 x10 reps   Other SPT to mat table x2 reps  Assessment   Treatment Assessment Pt initially scheduled at 1 pm for therapy but had just gotten lunch and asked to have later session  Pt was able to participate with increased focus on gait, functional transfers and bed mobility  Pt remains limited by general weakness and decreased act tolerance  After 45 mins of PT pt stated that he just could not do anymore and asked to return to bed  He declined any further TE's, even bed side as he felt "shaky" and "weak"   Per pt he may not be able to participate tomorrow as he will be going out to procedure  Per Dr Jessica Ac he may be med hold if necessary  Pt will cont to benefit from cont gait, increased functional transfers, consistent ability to perform STS transfers and increased act tolerance to decrease burden of care  Cont POC as tolerated  Problem List Decreased strength;Decreased range of motion;Decreased endurance; Impaired balance;Decreased mobility;Pain   Barriers to Discharge Inaccessible home environment   PT Barriers   Physical Impairment Decreased strength;Decreased endurance; Impaired balance;Decreased mobility; Impaired sensation;Decreased skin integrity;Pain   Plan   Treatment/Interventions Functional transfer training;LE strengthening/ROM; Therapeutic exercise; Endurance training;Patient/family training;Bed mobility;Gait training   Progress Progressing toward goals   Recommendation   PT Discharge Recommendation Home with home health rehabilitation   Equipment Recommended Walker   PT Therapy Minutes   PT Time In 1430   PT Time Out 1515   PT Total Time (minutes) 45   PT Mode of treatment - Individual (minutes) 45   PT Mode of treatment - Concurrent (minutes) 0   PT Mode of treatment - Group (minutes) 0   PT Mode of treatment - Co-treat (minutes) 0   PT Mode of Treatment - Total time(minutes) 45 minutes   PT Cumulative Minutes 265   Therapy Time missed   Time missed?  No

## 2022-12-20 ENCOUNTER — APPOINTMENT (INPATIENT)
Dept: RADIOLOGY | Facility: HOSPITAL | Age: 57
End: 2022-12-20

## 2022-12-20 LAB
ALBUMIN SERPL BCP-MCNC: 1.7 G/DL (ref 3.5–5)
ALP SERPL-CCNC: 1025 U/L (ref 46–116)
ALT SERPL W P-5'-P-CCNC: 17 U/L (ref 12–78)
AST SERPL W P-5'-P-CCNC: 58 U/L (ref 5–45)
BILIRUB DIRECT SERPL-MCNC: 0.94 MG/DL (ref 0–0.2)
BILIRUB SERPL-MCNC: 1.33 MG/DL (ref 0.2–1)
GLUCOSE SERPL-MCNC: 108 MG/DL (ref 65–140)
GLUCOSE SERPL-MCNC: 126 MG/DL (ref 65–140)
GLUCOSE SERPL-MCNC: 129 MG/DL (ref 65–140)
GLUCOSE SERPL-MCNC: 147 MG/DL (ref 65–140)
GLUCOSE SERPL-MCNC: 148 MG/DL (ref 65–140)
PROT SERPL-MCNC: 7.9 G/DL (ref 6.4–8.4)

## 2022-12-20 PROCEDURE — 0W9G30Z DRAINAGE OF PERITONEAL CAVITY WITH DRAINAGE DEVICE, PERCUTANEOUS APPROACH: ICD-10-PCS | Performed by: RADIOLOGY

## 2022-12-20 RX ORDER — INSULIN GLARGINE 100 [IU]/ML
12 INJECTION, SOLUTION SUBCUTANEOUS
Status: DISCONTINUED | OUTPATIENT
Start: 2022-12-21 | End: 2022-12-28

## 2022-12-20 RX ORDER — DEXTROSE, SODIUM CHLORIDE, AND POTASSIUM CHLORIDE 5; .45; .15 G/100ML; G/100ML; G/100ML
80 INJECTION INTRAVENOUS CONTINUOUS
Status: DISCONTINUED | OUTPATIENT
Start: 2022-12-20 | End: 2022-12-20

## 2022-12-20 RX ORDER — MIDAZOLAM HYDROCHLORIDE 2 MG/2ML
INJECTION, SOLUTION INTRAMUSCULAR; INTRAVENOUS AS NEEDED
Status: COMPLETED | OUTPATIENT
Start: 2022-12-20 | End: 2022-12-20

## 2022-12-20 RX ORDER — FENTANYL CITRATE 50 UG/ML
INJECTION, SOLUTION INTRAMUSCULAR; INTRAVENOUS AS NEEDED
Status: COMPLETED | OUTPATIENT
Start: 2022-12-20 | End: 2022-12-20

## 2022-12-20 RX ORDER — INSULIN LISPRO 100 [IU]/ML
6 INJECTION, SOLUTION INTRAVENOUS; SUBCUTANEOUS
Status: DISCONTINUED | OUTPATIENT
Start: 2022-12-21 | End: 2023-01-10

## 2022-12-20 RX ORDER — INSULIN GLARGINE 100 [IU]/ML
6 INJECTION, SOLUTION SUBCUTANEOUS
Status: COMPLETED | OUTPATIENT
Start: 2022-12-20 | End: 2022-12-20

## 2022-12-20 RX ADMIN — FENTANYL CITRATE 50 MCG: 50 INJECTION INTRAMUSCULAR; INTRAVENOUS at 18:55

## 2022-12-20 RX ADMIN — ACETAMINOPHEN 975 MG: 325 TABLET, FILM COATED ORAL at 22:17

## 2022-12-20 RX ADMIN — MELATONIN TAB 3 MG 3 MG: 3 TAB at 22:17

## 2022-12-20 RX ADMIN — FENTANYL CITRATE 50 MCG: 50 INJECTION INTRAMUSCULAR; INTRAVENOUS at 18:59

## 2022-12-20 RX ADMIN — FENTANYL CITRATE 50 MCG: 50 INJECTION INTRAMUSCULAR; INTRAVENOUS at 19:18

## 2022-12-20 RX ADMIN — SIMETHICONE 80 MG: 80 TABLET, CHEWABLE ORAL at 22:17

## 2022-12-20 RX ADMIN — DEXTROSE, SODIUM CHLORIDE, AND POTASSIUM CHLORIDE 80 ML/HR: 5; .45; .15 INJECTION INTRAVENOUS at 17:48

## 2022-12-20 RX ADMIN — MIDAZOLAM 1 MG: 1 INJECTION INTRAMUSCULAR; INTRAVENOUS at 18:55

## 2022-12-20 RX ADMIN — HEPARIN SODIUM 5000 UNITS: 5000 INJECTION INTRAVENOUS; SUBCUTANEOUS at 22:19

## 2022-12-20 RX ADMIN — AMLODIPINE BESYLATE 5 MG: 5 TABLET ORAL at 20:25

## 2022-12-20 RX ADMIN — MIDAZOLAM 1 MG: 1 INJECTION INTRAMUSCULAR; INTRAVENOUS at 18:59

## 2022-12-20 RX ADMIN — MIDAZOLAM 2 MG: 1 INJECTION INTRAMUSCULAR; INTRAVENOUS at 19:17

## 2022-12-20 RX ADMIN — ERTAPENEM SODIUM 1000 MG: 1 INJECTION, POWDER, LYOPHILIZED, FOR SOLUTION INTRAMUSCULAR; INTRAVENOUS at 20:43

## 2022-12-20 RX ADMIN — INSULIN GLARGINE 6 UNITS: 100 INJECTION, SOLUTION SUBCUTANEOUS at 22:15

## 2022-12-20 RX ADMIN — OXYCODONE HYDROCHLORIDE 10 MG: 10 TABLET ORAL at 22:16

## 2022-12-20 RX ADMIN — METHOCARBAMOL 500 MG: 500 TABLET ORAL at 22:17

## 2022-12-20 NOTE — PROGRESS NOTES
Progress Note - Surgical Oncology  Kasey Dunn 61 y o  male MRN: [id number]  Unit/Bed#: -25 Encounter: [id number]      Objective: Patient states he feels better this morning  Awaiting  IR drain placement today  Patient states started to thicken last night  Denies fevers, no chills  Patient was tolerating a diabetic diet  Feels stronger  No emesis  Continues on ertapenem  600 UA  30 Perc radha  25 epigastric IR drain  1650 ileostomy    Blood pressure 102/60, pulse 94, temperature 98 °F (36 7 °C), temperature source Oral, resp  rate 16, height 5' 10" (1 778 m), weight 104 kg (230 lb), SpO2 95 %  ,Body mass index is 33 kg/m²  Intake/Output Summary (Last 24 hours) at 12/20/2022 0513  Last data filed at 12/19/2022 2139  Gross per 24 hour   Intake 190 ml   Output 2305 ml   Net -2115 ml       Invasive Devices     Central Venous Catheter Line  Duration           Port A Cath 03/10/22 Right Chest 284 days          Drain  Duration           Ileostomy LUQ 32 days    Abscess Drain Abdomen 11 days    Cholecystostomy Tube 7 days                Physical Exam:   Abdomen: Soft, does not appear distended, midline dressing will be changed later this morning, IR drain epigastric region is light tannish in color, ileostomy functioning well air in the bag, brownish fluid along with some soft stool, perc radha  drain with dark bile  Extremities: no edema bilaterally    Lab, Imaging and other studies:  pending  VTE Pharmacologic Prophylaxis: Heparin  VTE Mechanical Prophylaxis: sequential compression device    Assessment:  S/P metastatic colon cancer, segment 3 liver resection, transverse colectomy reversal colostomy 11/7/22  Re exploration right hemicolectomy, partial left hemicolectomy, primary ileocolonic anastomosis, diverting ileostomy 11/16 12/8/22 Perc radha, hepatic drain placement - IR    Plan:  1  NPO for IR drain placement  2  Continue to monitor I/O's  3  Continue SQH for DVT prophylaxis  4   Continue ID recommendations  5  Perc radha to drainage  6  Continue ileostomy care and teaching  7   Continue to work with rehab to gain strength

## 2022-12-20 NOTE — PLAN OF CARE
Problem: Prexisting or High Potential for Compromised Skin Integrity  Goal: Skin integrity is maintained or improved  Description: INTERVENTIONS:  - Identify patients at risk for skin breakdown  - Assess and monitor skin integrity  - Assess and monitor nutrition and hydration status  - Monitor labs   - Assess for incontinence   - Turn and reposition patient  - Assist with mobility/ambulation  - Relieve pressure over bony prominences  - Avoid friction and shearing  - Provide appropriate hygiene as needed including keeping skin clean and dry  - Evaluate need for skin moisturizer/barrier cream  - Collaborate with interdisciplinary team   - Patient/family teaching  - Consider wound care consult   Outcome: Progressing     Problem: Potential for Falls  Goal: Patient will remain free of falls  Description: INTERVENTIONS:  - Educate patient/family on patient safety including physical limitations  - Instruct patient to call for assistance with activity   - Consult OT/PT to assist with strengthening/mobility   - Keep Call bell within reach  - Keep bed low and locked with side rails adjusted as appropriate  - Keep care items and personal belongings within reach  - Initiate and maintain comfort rounds  - Make Fall Risk Sign visible to staff  - Offer Toileting every 3 Hours, in advance of need  - Initiate/Maintain bed alarm  - Obtain necessary fall risk management equipment: bed alarm  - Apply yellow socks and bracelet for high fall risk patients  - Consider moving patient to room near nurses station  Outcome: Progressing     Problem: PAIN - ADULT  Goal: Verbalizes/displays adequate comfort level or baseline comfort level  Description: Interventions:  - Encourage patient to monitor pain and request assistance  - Assess pain using appropriate pain scale  - Administer analgesics based on type and severity of pain and evaluate response  - Implement non-pharmacological measures as appropriate and evaluate response  - Consider cultural and social influences on pain and pain management  - Notify physician/advanced practitioner if interventions unsuccessful or patient reports new pain  Outcome: Progressing     Problem: INFECTION - ADULT  Goal: Absence or prevention of progression during hospitalization  Description: INTERVENTIONS:  - Assess and monitor for signs and symptoms of infection  - Monitor lab/diagnostic results  - Monitor all insertion sites, i e  indwelling lines, tubes, and drains  - Monitor endotracheal if appropriate and nasal secretions for changes in amount and color  - Cashion appropriate cooling/warming therapies per order  - Administer medications as ordered  - Instruct and encourage patient and family to use good hand hygiene technique  - Identify and instruct in appropriate isolation precautions for identified infection/condition  Outcome: Progressing     Problem: SAFETY ADULT  Goal: Patient will remain free of falls  Description: INTERVENTIONS:  - Educate patient/family on patient safety including physical limitations  - Instruct patient to call for assistance with activity   - Consult OT/PT to assist with strengthening/mobility   - Keep Call bell within reach  - Keep bed low and locked with side rails adjusted as appropriate  - Keep care items and personal belongings within reach  - Initiate and maintain comfort rounds  - Make Fall Risk Sign visible to staff  - Offer Toileting every 2 Hours, in advance of need  - Initiate/Maintain bed alarm  - Obtain necessary fall risk management equipment: bed alarm  - Apply yellow socks and bracelet for high fall risk patients  - Consider moving patient to room near nurses station  Outcome: Progressing  Goal: Maintain or return to baseline ADL function  Description: INTERVENTIONS:  -  Assess patient's ability to carry out ADLs; assess patient's baseline for ADL function and identify physical deficits which impact ability to perform ADLs (bathing, care of mouth/teeth, toileting, grooming, dressing, etc )  - Assess/evaluate cause of self-care deficits   - Assess range of motion  - Assess patient's mobility; develop plan if impaired  - Assess patient's need for assistive devices and provide as appropriate  - Encourage maximum independence but intervene and supervise when necessary  - Involve family in performance of ADLs  - Assess for home care needs following discharge   - Consider OT consult to assist with ADL evaluation and planning for discharge  - Provide patient education as appropriate  Outcome: Progressing  Goal: Maintains/Returns to pre admission functional level  Description: INTERVENTIONS:  - Perform BMAT or MOVE assessment daily    - Set and communicate daily mobility goal to care team and patient/family/caregiver  - Collaborate with rehabilitation services on mobility goals if consulted  - Perform Range of Motion 3 times a day  - Reposition patient every 2 hours    - Dangle patient 3 times a day  - Stand patient 3 times a day  - Ambulate patient 3 times a day  - Out of bed to chair 3 times a day   - Out of bed for meals 3 times a day  - Out of bed for toileting  - Record patient progress and toleration of activity level   Outcome: Progressing     Problem: DISCHARGE PLANNING  Goal: Discharge to home or other facility with appropriate resources  Description: INTERVENTIONS:  - Identify barriers to discharge w/patient and caregiver  - Arrange for needed discharge resources and transportation as appropriate  - Identify discharge learning needs (meds, wound care, etc )  - Arrange for interpretive services to assist at discharge as needed  - Refer to Case Management Department for coordinating discharge planning if the patient needs post-hospital services based on physician/advanced practitioner order or complex needs related to functional status, cognitive ability, or social support system  Outcome: Progressing     Problem: Nutrition/Hydration-ADULT  Goal: Nutrient/Hydration intake appropriate for improving, restoring or maintaining nutritional needs  Description: Monitor and assess patient's nutrition/hydration status for malnutrition  Collaborate with interdisciplinary team and initiate plan and interventions as ordered  Monitor patient's weight and dietary intake as ordered or per policy  Utilize nutrition screening tool and intervene as necessary  Determine patient's food preferences and provide high-protein, high-caloric foods as appropriate       INTERVENTIONS:  - Monitor oral intake, urinary output, labs, and treatment plans  - Assess nutrition and hydration status and recommend course of action  - Evaluate amount of meals eaten  - Assist patient with eating if necessary   - Allow adequate time for meals  - Recommend/ encourage appropriate diets, oral nutritional supplements, and vitamin/mineral supplements  - Order, calculate, and assess calorie counts as needed  - Recommend, monitor, and adjust tube feedings and TPN/PPN based on assessed needs  - Assess need for intravenous fluids  - Provide specific nutrition/hydration education as appropriate  - Include patient/family/caregiver in decisions related to nutrition  Outcome: Progressing     Problem: MOBILITY - ADULT  Goal: Maintain or return to baseline ADL function  Description: INTERVENTIONS:  -  Assess patient's ability to carry out ADLs; assess patient's baseline for ADL function and identify physical deficits which impact ability to perform ADLs (bathing, care of mouth/teeth, toileting, grooming, dressing, etc )  - Assess/evaluate cause of self-care deficits   - Assess range of motion  - Assess patient's mobility; develop plan if impaired  - Assess patient's need for assistive devices and provide as appropriate  - Encourage maximum independence but intervene and supervise when necessary  - Involve family in performance of ADLs  - Assess for home care needs following discharge   - Consider OT consult to assist with ADL evaluation and planning for discharge  - Provide patient education as appropriate  Outcome: Progressing  Goal: Maintains/Returns to pre admission functional level  Description: INTERVENTIONS:  - Perform BMAT or MOVE assessment daily    - Set and communicate daily mobility goal to care team and patient/family/caregiver  - Collaborate with rehabilitation services on mobility goals if consulted  - Perform Range of Motion 3 times a day  - Reposition patient every 2 hours    - Dangle patient 3 times a day  - Stand patient 3 times a day  - Ambulate patient 3 times a day  - Out of bed to chair 3 times a day   - Out of bed for meals 3 times a day  - Out of bed for toileting  - Record patient progress and toleration of activity level   Outcome: Progressing

## 2022-12-20 NOTE — PROGRESS NOTES
PM&R PROGRESS NOTE:  Vic Mart 61 y o  male MRN: [id number]  Unit/Bed#: -36 Encounter: [id number]        Rehabilitation Diagnosis: Impairment of mobility, safety and Activities of Daily Living (ADLs) due to Neurologic Conditions:  03 8  Neuromuscular Disorders  Etiology: Critical Illness Myopathy    HPI: Williams Hampton is a 61 y o  male with a medical history of HTN, HLD, GERD, and ventral hernia that presented to [facility] on 11/7 for an elective surgical procedure due to metastatic colon adenocarcinoma by Dr Geovanna Rosen  [facility] abdomen showed transverse colonic apple core lesion and inumberable hepatic metastases  MRI revealed multiple hepatic metastasis with smaller lesions in left lobe and larger lesions in right lobe  Patient completed chemotherapy, colostomy at that time  On 11/7 patient underwent exploratory laparotomy, segment 3 liver resection, ablation, intraoperative ultrasound, and colostomy reversal  This was c/b left upper quadrant hematoma, imaging showed suspected leak from transverse colon anastomosis  On 11/16, patient underwent exp  Lap which revealed LUQ infected hematoma, defect in transverse colon anastomosis with extravasation of succus  Massively dilated cecum requiring resection  He had a right hemicolectomy, left in discontinuity and temporary abdominal closure  On 11/17, re-exploration, partial descending colectomy, primary colonic anastomosis created with diverting loop ileostomy and midline wound VAC placement  He completed course of IV abx for ESBL bacteremia from abdominal abscess  Midline abdominal incision wet to dry dressings and packing to old stoma site  12/6 abdominal incision noted to have yellow drainage  CT abdomen showed abdominal abscess and distended gallbladder  Patient underwent percutaneous cholecystostomy tube insertion and hepatectomy abscess drainage  ID consulted, IV abx for 1 week   Nephrology consulted d/t ALEXY, creatinine baseline 1 2-1 4  On 12/14, patient experienced chest pain with movement  Cardiology evaluated; troponin level 57; chest pain appeared musculoskeletal with no further work up  Patient deemed medically stable and admitted to [facility] on 12/14/2022  SUBJECTIVE: Patient seen and examined at bedside  No acute events overnight however remains very anxious about the procedure today which is affecting his overall mood  He is currently still n p o  and is for his procedure later this afternoon at approximately 1530  His Lantus dose was cut in half due to the sudden we have been periodically checking his sugar through the day to make sure that he does not become hypoglycemic  He was assisting in ostomy bag changes today and fully participating in therapy  We spent a significant mount of time today with general encouragement and ways to overcome some of these mental as well as physical barriers  Appreciate all of the assistance from the consulting teams including wound care, surgery, IR and the infectious disease team   Continue on current course of ertapenem  New labs from today 12/20 show fairly stable and slightly lower AST normal ALT stable but high alkaline phosphatase, stable albumin and total bilirubin  Currently still without a fever  Continue monitoring leukocytosis which was down yesterday to 12 8 from 18 24 the day prior  Patient denies fever, chills, nausea, emesis, cough, shortness of breath, diarrhea, or constipation  Sleep was fine, mood stable  Pain is controlled  Patient denies fever, chills, nausea, emesis, cough, shortness of breath, diarrhea, or constipation  Sleep was impaired due to worrying about procedure, mood stable  Pain is controlled  ASSESSMENT: Stable, progressing    Rehabilitation  • Functional deficits:  Self care and mobility  • Continue current rehabilitation plan of care to maximize function      • Functional update:   o PT: mobility- mod A, transfers- total assist (mod A x1 with 2nd standby)  o OT: ADL- Max A, UB dressing- Mod A, LB dressing- total A; toileting- total assist    • Estimated Discharge: ADD 3 weeks    Pain  • Tylenol, oxycodone    DVT prophylaxis  • Heparin sc    Bladder plan  • Continent, urinal    Bowel plan  • ileostomy      * Malignant neoplasm of transverse colon [facility])  Assessment & Plan  · Transverse colonic apple core lesion and innumerable hepatic metastases  · Colostomy 2/22  · RCW port-a-cath, accessed  · S/p palliative chemo  · Follows with Dr Robert Szymanski (palliative)  · Follows hem/onc (Dr Julian Roland)    Sepsis [facility])  Assessment & Plan  · SIRS versus sepsis at this time given his vitals, leukocytosis, and complicated infection history  · Had finished his course of Zosyn on 1212 and ertapenem by 1215  · Consultation to infectious disease, appreciate their recommendations  · Drawing blood cultures x2 restarting his ertapenem and obtaining a CT abdomen and pelvis at this time  · Currently improved on ertapenem, which was restarted due to increasing leukocytosis  · For IR drain placement x2 for 2 additional abscesses  · Follow-up with blood cultures    Acute kidney injury ([facility] [address] )  Assessment & Plan  · Creatinine baseline 1 2-1 4  · Current creatinine 1 19  · Consider consult nephrology  · Monitor BMP    Bacteremia  Assessment & Plan  · Abdominal abscess- ESBL E Coli  · Zosyn/Ertapenam 7 days (Ertapenem completed 12/15, Zosyn 12/12)  · Contact precautions  · VICTORINO drain- milky drainage  · Due to elevated leukocytosis over this past weekend reconsulted infectious disease and continues on ertapenem    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 liver [facility])  Assessment & Plan  · MRI-multiple hepatic metastasis; smaller lesions in left lobe, larger lesions in right lobe  · 11/7 Exp Lap, resection of hepatic segment 3, resection of transverse colon with reversal of loop colostomy (Dr Brody Haddad)  · 11/16- right hemicolectomy, left discontinuity and temporary abdominal closure device placed  · 11/17- re-exploration, partial descending colectomy, primary colocolonic anastomosis with diverting loop ileostomy, midline VAC placement  · Abdominal incision- yellow drainage; CT showed abdominal abscess and distended gallbladder  · 12/8- IR percutaneous cholecystostomy tube, hepatectomy abscess drain placement  · Monitor incisions, dressings  · Monitor CBC, CMP  · IM consulted for assistance with management  · Follow-up outpatient with hem/onc  · For IR on 12/20 to have additional drains placed for a perisplenic abscess as well as a splenic recess abscess  Antibiotics have been reinitiated earlier this week    Iron deficiency anemia, unspecified  Assessment & Plan  · Hbg 8 7 (previously 9 3, 8 0,7 3)  · Consent obtained  · 12/15-1 unit PRBc  · Monitor CBC  · 12/16- symptomatic- 1 unit PRBc    Obesity (BMI 30-39  9)  Assessment & Plan  · BMI 33 0  · Diet and lifestyle modifications  · Nutrition consult    Benign essential hypertension  Assessment & Plan  · Home: Lisinopril  · Here: Norvasc 5 mg BID  · Adjust medication as needed  · IM consult for assistance with management  · Monitored outpatient by PCP    Type 2 diabetes mellitus without complication, with long-term current use of insulin (HCC)  Assessment & Plan  · HbgA1c 8 0 (9/22)  · Home monitoring with Leonidas Patel  · Home: Lantus 12 units, Humalog 3 units, Januvia  · Here: Humalog 3 units with meals, SSI, Lantus   · Follow with PCP (Dr Omar Clark)      [facility] consultants medical co-management  Labs, medications, and imaging reviewed  ROS:  A 10 point review of systems was negative on 12/20/22 except for what is noted in the HPI  OBJECTIVE:   /68 (BP Location: Left arm)   Pulse 82   Temp 98 4 °F (36 9 °C) (Oral)   Resp 16   Ht 5' 10" (1 778 m)   Wt 104 kg (230 lb)   SpO2 96%   BMI 33 00 kg/m²     Physical Exam  Vitals and nursing note reviewed  Constitutional:       General: He is not in acute distress  Appearance: Normal appearance  He is obese  HENT:      Head: Normocephalic and atraumatic  Right Ear: External ear normal       Left Ear: External ear normal       Nose: Nose normal  No rhinorrhea  Mouth/Throat:      Mouth: Mucous membranes are moist       Pharynx: No posterior oropharyngeal erythema  Eyes:      General: No scleral icterus  Cardiovascular:      Rate and Rhythm: Normal rate and regular rhythm  Pulses: Normal pulses  Heart sounds: Normal heart sounds  Pulmonary:      Effort: Pulmonary effort is normal    Abdominal:      General: Bowel sounds are normal  There is no distension  Palpations: Abdomen is soft  Comments: +ileostomy, +right cholecystectomy drain, +left VICTORINO drain   Musculoskeletal:         General: Normal range of motion  Cervical back: Normal range of motion  Right lower leg: No edema  Left lower leg: No edema  Skin:     General: Skin is warm and dry  Neurological:      Mental Status: He is alert and oriented to person, place, and time  Sensory: No sensory deficit  Motor: Weakness present     Psychiatric:         Mood and Affect: Mood normal          Behavior: Behavior normal        Lab Results   Component Value Date    WBC 12 82 (H) 12/19/2022    HGB 8 7 (L) 12/19/2022    HCT 27 2 (L) 12/19/2022    MCV 86 12/19/2022     12/19/2022     Lab Results   Component Value Date    SODIUM 136 12/19/2022    K 3 5 12/19/2022     12/19/2022    CO2 24 12/19/2022    BUN 16 12/19/2022    CREATININE 1 19 12/19/2022    GLUC 144 (H) 12/19/2022    CALCIUM 8 4 12/19/2022     Lab Results   Component Value Date    INR 1 12 12/19/2022    INR 1 10 11/12/2022    INR 1 24 (H) 11/09/2022    PROTIME 14 7 (H) 12/19/2022    PROTIME 14 4 11/12/2022    PROTIME 15 8 (H) 11/09/2022       Current Facility-Administered Medications:   •  acetaminophen (TYLENOL) tablet 975 mg, 975 mg, Oral, Q8H [address], GAURANG Keith, 975 mg at 12/19/22 2115  • amLODIPine (NORVASC) tablet 5 mg, 5 mg, Oral, BID, GAURANG Keith, 5 mg at 12/19/22 1709  •  aspirin chewable tablet 81 mg, 81 mg, Oral, Daily, GAURANG Keith, 81 mg at 12/19/22 0817  •  atorvastatin (LIPITOR) tablet 40 mg, 40 mg, Oral, Daily, GAURANG Keith, 40 mg at 12/19/22 0816  •  calcium carbonate (TUMS) chewable tablet 500 mg, 500 mg, Oral, BID PRN, GAURANG Keith, 500 mg at 12/19/22 1715  •  cholecalciferol (VITAMIN D3) tablet 400 Units, 400 Units, Oral, Daily, GAURANG Keith, 400 Units at 12/19/22 0819  •  ertapenem (INVanz) 1,000 mg in sodium chloride 0 9 % 50 mL IVPB, 1,000 mg, Intravenous, Q24H, Fareed Treviño MD, Last Rate: 100 mL/hr at 12/19/22 2005, 1,000 mg at 12/19/22 2005  •  heparin (porcine) subcutaneous injection 5,000 Units, 5,000 Units, Subcutaneous, Q8H [facility], GAURANG Keith, 5,000 Units at 12/19/22 2116  •  insulin glargine (LANTUS) subcutaneous injection 12 Units 0 12 mL, 12 Units, Subcutaneous, HS, GAURANG Lazcano  •  insulin lispro (HumaLOG) 100 units/mL subcutaneous injection 1-5 Units, 1-5 Units, Subcutaneous, HS, GAURANG Keith, 1 Units at 12/18/22 2135  •  insulin lispro (HumaLOG) 100 units/mL subcutaneous injection 1-6 Units, 1-6 Units, Subcutaneous, TID AC, 1 Units at 12/19/22 1642 **AND** Fingerstick Glucose (POCT), , , TID AC, GAURANG Keith  •  [START ON 12/21/2022] insulin lispro (HumaLOG) 100 units/mL subcutaneous injection 6 Units, 6 Units, Subcutaneous, TID With Meals, GAURANG Lazcano  •  iohexol (OMNIPAQUE) 240 MG/ML solution 50 mL, 50 mL, Oral, 90 min pre-procedure, Fareed Treviño MD  •  melatonin tablet 3 mg, 3 mg, Oral, HS, GAURANG Keith, 3 mg at 12/19/22 2009  •  methocarbamol (ROBAXIN) tablet 500 mg, 500 mg, Oral, BID, GAURANG Keith, 500 mg at 12/19/22 1709  •  multivitamin-minerals (CENTRUM) tablet 1 tablet, 1 tablet, Oral, Daily, GAURANG Keith, 1 tablet at 12/19/22 0816  •  ondansetron (ZOFRAN-ODT) dispersible tablet 4 mg, 4 mg, Oral, Q6H PRN, GAURANG Keith, 4 mg at 12/17/22 0851  •  oxyCODONE (ROXICODONE) immediate release tablet 10 mg, 10 mg, Oral, Q6H PRN, GAURANG Keith, 10 mg at 12/19/22 2115  •  oxyCODONE (ROXICODONE) IR tablet 5 mg, 5 mg, Oral, Q6H PRN, GAURANG Keith  •  pantoprazole (PROTONIX) EC tablet 40 mg, 40 mg, Oral, BID AC, GAURANG Keith, 40 mg at 12/19/22 1641  •  simethicone (MYLICON) chewable tablet 80 mg, 80 mg, Oral, 4x Daily (with meals and at bedtime), GAURANG Keith, 80 mg at 12/19/22 2134  •  tamsulosin (FLOMAX) capsule 0 4 mg, 0 4 mg, Oral, Daily With Dinner, GAURANG Coello, 0 4 mg at 12/19/22 1641    Past Medical History:   Diagnosis Date   • Abdominal pain 03/12/2022   • Acute renal failure ([place] [address] )     [id number] resolved   • Cancer ([place] [address] )    • Diabetes mellitus ([facility] )    • Enteritis 08/23/2016   • Gastroparesis due to DM ([facility] ) 08/23/2016   • GERD (gastroesophageal reflux disease)    • Hernia, ventral 08/04/2016   • Hyperlipidemia    • Hypertension    • Morbid obesity ([facility] ) 04/17/2018   • Postoperative visit 03/02/2022   • SIRS (systemic inflammatory response syndrome) ([facility] ) 03/12/2022   • Snoring    • Stage 3a chronic kidney disease ([facility] ) 02/19/2022       Patient Active Problem List    Diagnosis Date Noted   • Malignant neoplasm of transverse colon ([facility] ) 03/01/2022   • Sepsis ([facility] ) 12/17/2022   • Severe protein-calorie malnutrition ([facility] ) 12/14/2022   • Acute kidney injury ([facility] ) 12/14/2022   • [facility] ) 11/12/2022   • MR (mitral regurgitation) 11/12/2022   • Bacteremia 11/12/2022   • ESBL (extended spectrum beta-lactamase) producing bacteria infection 11/12/2022   • Acute respiratory failure with hypoxia ([facility] ) 11/12/2022   • Encephalopathy 11/09/2022   • Cervical radiculopathy 10/26/2022   • Other fatigue 06/15/2022   • Colostomy prolapse ([facility] ) 05/27/2022   • Colon cancer metastasized to liver ([facility] ) 03/12/2022   • 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 liver ([facility] ) 03/02/2022   • Iron deficiency anemia, unspecified 03/01/2022   • Thrombocytosis 02/21/2022   • Hypokalemia 02/19/2022   • Transaminitis 02/19/2022   • Type 2 diabetes mellitus with hyperlipidemia ([facility] ) 02/18/2022   • Colonic mass 02/18/2022   • Microcytic anemia 02/18/2022   • Left ureteral calculus 01/30/2020   • Incarcerated umbilical hernia 97/01/8294   • Testicular hypogonadism 06/19/2017   • Low testosterone 05/30/2017   • Type 2 diabetes mellitus without complication, with long-term current use of insulin ([place] [address] ) 08/23/2016   • Benign essential hypertension 08/23/2016   • Mixed hyperlipidemia 08/23/2016   • Erectile dysfunction 07/11/2016   • Obesity (BMI 30-39 9) 07/11/2016      Peter Maldonado DO  Physical Medicine and [address]    Total time spent:  40 minutes, with more than 50% spent counseling/coordinating care  Counseling includes discussion with patient re: progress in therapies, functional issues observed by therapy staff, and discussion with patient his/her current medical state/wellbeing  Coordination of patient's care was performed in conjunction with Internal Medicine service to monitor patient's labs, vitals, and management of their comorbidities  Additional time spent with patient discussing ways to overcome physical mental barriers with regards to getting this procedure in the afternoon as well as fully participating in therapy so that he may have the opportunity to continue progressing with us  He remains highly motivated to work hard and return to the community post discharge  Additional time spent with the multiple coordinating medical specialty teams

## 2022-12-20 NOTE — PCC OCCUPATIONAL THERAPY
Occupational Therapy Weekly Team Note    Pt continues to make gradual progress towards OT goals  Family training has occurred last week with pt's wife in preparation for d/c  Both pt and wife feeling increasingly confident in regards to d/c plan  Pt continues to be limited by dec standing julia/balance, endurance, strength and act julia  He is currently functioning at an overall SUP level with RW for transfers and SUP level for basic self care  Will benefit from continued skilled OT services following POC with focus on above mentioned deficits to increase safety and independence with I/ADL tasks   D/C date: TBD

## 2022-12-20 NOTE — CASE MANAGEMENT
Clinical update faxed via [employer] to Araceli Parks at [facility], [phone], requested an additional 7 days, awaiting determination

## 2022-12-20 NOTE — PROGRESS NOTES
12/20/22 1610   Therapy Time missed   Time missed?  Yes   Amount of time missed 75   Reason for time missed Medical procedure  (pt NPO since midnight and off floor for procedure today)

## 2022-12-20 NOTE — DISCHARGE INSTRUCTIONS
TUBE CARE INSTRUCTIONS    Care after your procedure:    Resume your normal diet  Small sips of flat soda will help with nausea  1  The properly functioning catheter should be forward flushed once (1x) daily with 10ml of normal saline using clean technique  You will be given a prescription for flushes  To flush the tube, clean both connections with alcohol swab  Twist off the drainage bag/ bulb  tubing and twist the saline syringe into the drainage tube and flush  Remove the syringe and twist the drainage bag / bulb tubing tubing back on     2  The drainage bag/bulb may be emptied as necessary  Keep a record of the amount of fluid you drain from your tube  This should be done with clean technique as well  3  A fresh dressing should be applied daily over the tube insertion site  4  As the tube is secured to the skin with only a suture,try not to pull on your tube  Tub baths are not permitted  Showers are permitted if the patient's skin entry site is prevented from getting wet  Similarly, washcloth "baths" are acceptable  Contact Interventional Radiology at [phone] Tabitha PATIENTS: Contact Interventional Radiology at [phone]) Duane Crank PATIENTS: Contact Interventional Radiology at [phone]) if:    1  Leakage or large amounts of liquid around the catheter  2  Fever of 101 degrees lasting several hours without other obvious cause (such as sore throat, flu, etc)  3  Persistent nausea or vomiting  4  Diminished drainage, which may be associated with pressure or pain  Or when the     drainage from your tube is less than 10mls for 48 hours  5  Catheter pulled back or falls out  The following pharmacies carry the flush syringes         [facility]                     [facility]  [address]                         [address] [facility] [place]  Phone [phone]            Phone [phone]   [address]                                [phone]  [address] [facility] [address] DEJESUS                      [address] [place]  Phone [phone]            Phone [phone]                      [place] [place]                                                                                                          [phone]  [facility]  [address]  [place]  Phone [phone]     Procedural Sedation   WHAT YOU NEED TO KNOW:   Procedural sedation is medicine used during procedures to help you feel relaxed and calm  You will remember little to none of the procedure  After sedation you may feel tired, weak, or unsteady on your feet  You may also have trouble concentrating or short-term memory loss  These symptoms should go away in 24 hours or less  DISCHARGE INSTRUCTIONS:     Call 911 or have someone else call for any of the following: You have sudden trouble breathing  You cannot be woken  Contact Interventional Radiology at [phone]   Tabitha PATIENTS: Contact Interventional Radiology at [phone]) Khadijah Rogers PATIENTS: Contact Interventional Radiology at [phone]) if any of the following occur: You have a severe headache or dizziness  Your heart is beating faster than usual     You have a fever or chills  Your skin is itchy, swollen, or you have a rash  You have nausea or are vomiting for more than 8 hours after the procedure  You have questions or concerns about your condition or care  Self-care:   Have someone stay with you for 24 hours   This person can drive you to errands and help you do things around the house  This person can also watch for problems  Rest and do quiet activities for 24 hours  Do not exercise, ride a bike, or play sports  Stand up slowly to prevent dizziness and falls  Take short walks around the house with another person  Slowly return to your usual activities the next day  Do not drive or use dangerous machines or tools for 24 hours  You may injure yourself or others  Examples include a lawnmower, saw, or drill  Do not return to work for 24 hours if you use dangerous machines or tools for work  Do not make important decisions for 24 hours  For example, do not sign important papers or invest money  Drink liquids as directed  Liquids help flush the sedation medicine out of your body  Ask how much liquid to drink each day and which liquids are best for you  Eat small, frequent meals to prevent nausea and vomiting  Start with clear liquids such as juice or broth  If you do not vomit after clear liquids, you can eat your usual foods  Do not drink alcohol or take medicines that make you drowsy  This includes medicines that help you sleep and anxiety medicines  Ask your healthcare provider if it is safe for you to take pain medicine  Follow up with your healthcare provider as directed: Write down your questions so you remember to ask them during your visits  Thoracentesis   WHAT YOU NEED TO KNOW:   A thoracentesis is a procedure to remove extra fluid or air from between your lungs and your inner chest wall  Air or fluid buildup may make it hard for you to breathe  A thoracentesis allows your lungs to expand fully so you can breathe more easily  DISCHARGE INSTRUCTIONS:     Small amount of shoulder pain and bloody sputum is normal after a Thoracentesis  Rest:  Rest when you feel it is needed  Slowly start to do more each day  Return to your daily activities as directed  Resume your normal diet   Small sips of flat soda will help mild nausea  Do not smoke: If you smoke, it is never too late to quit  Ask for information about how to stop smoking if you need help  Contact Interventional Radiology at [phone] Tabitha PATIENTS: Contact Interventional Radiology at [phone]) Shruthi Senior PATIENTS: Contact Interventional Radiology at [phone]) if:   You have a fever  Your puncture site is red, warm, swollen, or draining pus  You have questions or concerns about your procedure, medicine, or care  Seek care immediately or call 911 if:   Severe chest pain with inspiration and shortness of breath    Large amounts of blood in your sputum    Follow up with your healthcare provider as directed

## 2022-12-20 NOTE — PROGRESS NOTES
Progress Note - Infectious Disease   Emilee Mccartney 61 y o  male MRN: [id number]  Unit/Bed#: -01 Encounter: [id number]      Impression/Recommendations:  Sepsis     Evolving 12/17:  WBC, HR   Suspect due to persistent left intra-abdominal infection in setting of complex history below, recently completed antibiotics   ROS and exam otherwise negative   Recent Flu/RSV/COVID PCR, CXR negative   Blood cultures negative  Subjectively improved with reinitiation of antibiotics  Rec:  • Continue ertapenem for now  • Follow temperatures and WBC closely  • IR evaluation for additional drain placement as below  • Supportive care as per the primary service     Intra-abdominal abscess     In the setting complex surgical history as below   Initially developed 11/2022 companied by ESBL E  coli bacteremia   Status post exploratory laparotomy, right hemicolectomy, temporary abdominal closure 11/16; re-exploration, partial left colectomy, primary ileocolonic anastomosis with proximal diverting loop ileostomy, midline fascial closure on 11/17   More recently developed abscess in hepatectomy bed and collection +/- leak at area of prior colonic anastamosis, 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   Status post IR drain into perihepatic collection 12/8   Pericolonic area not accessed   Cultures with ESBL E  Coli   Status post 7 days ertapenem after drain placement through 12/15   Repeat CT 12/17 shows hepatic bed collection improved, persistent left intra-abdominal collection    Rec:  • Continue antibiotics as above  • Continue hepatic bed drain  • IR to attempt additional drain placement  • May need more prolonged course of antibiotics - PO doxycycline an eventual option     Possible acute cholecystitis     Status post percutaneous cholecystostomy tube   Alk phos remains markedly elevated, possibly due to drain itself versus known intrahepatic metastases      Metastatic colon cancer   To liver, possible lung   Status post resection, chemotherapy, more recent hepatic resection and ablation, transverse colon resection      CKD   Baseline Cr 1 4     DM      Anemia   Status post multiple transfusions      The above impression and plan was discussed in detail with the patient      Antibiotics:  Ertapenem #4    Subjective:  Patient seen on AM rounds  Denies fevers, chills, sweats, nausea, vomiting, or diarrhea  24 Hour Events:  No documented fevers, chills, sweats, nausea, vomiting, or diarrhea  Objective:  Vitals:  Temp:  [98 °F (36 7 °C)-98 5 °F (36 9 °C)] 98 5 °F (36 9 °C)  HR:  [85-94] 85  Resp:  [16-18] 18  BP: (102-157)/(60-88) 148/64  SpO2:  [95 %-98 %] 96 %  Temp (24hrs), Av 2 °F (36 8 °C), Min:98 °F (36 7 °C), Max:98 5 °F (36 9 °C)  Current: Temperature: 98 5 °F (36 9 °C)    Physical Exam:   General:  No acute distress  Psychiatric:  Awake and alert  Pulmonary:  Normal respiratory excursion without accessory muscle use  Abdomen:  Soft, nontender  Extremities:  No edema  Skin:  No rashes    Lab Results:  I have personally reviewed pertinent labs  Results from last 7 days   Lab Units 22  0816 22  0536 22  1300 22  1136 12/15/22  0516   POTASSIUM mmol/L  --  3 5 3 7  --  3 9   CHLORIDE mmol/L  --  105 104  --  102   CO2 mmol/L  --  24 25  --  26   BUN mg/dL  --  16 14  --  15   CREATININE mg/dL  --  1 19 1 37*  --  1 38*   EGFR ml/min/1 73sq m  --  66 56  --  55   CALCIUM mg/dL  --  8 4 8 4  --  8 4   AST U/L 58* 61* 86*   < >  --    ALT U/L 17 19 19   < >  --    ALK PHOS U/L 1,025* 989* 1,185*   < >  --     < > = values in this interval not displayed  Results from last 7 days   Lab Units 22  0536 22  0632 22  0443   WBC Thousand/uL 12 82* 18 24* 10 52*   HEMOGLOBIN g/dL 8 7* 9 3* 8 0*   PLATELETS Thousands/uL 332 294 300     Results from last 7 days   Lab Units 22  1312 22  1234   BLOOD CULTURE  No Growth at 48 hrs  No Growth at 48 hrs         Imaging Studies:   I have personally reviewed pertinent imaging study reports and images in PACS  EKG, Pathology, and Other Studies:   I have personally reviewed pertinent reports

## 2022-12-20 NOTE — PROGRESS NOTES
Internal Medicine Progress Note  Patient: Zaria Hartmann  Age/sex: 61 y o  male  Medical Record #: [id number]      ASSESSMENT/PLAN: (Interval History)  Zaria Hartmann is seen and examined and management for following issues:    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  • s/p hepatic resection and reversal of colostomy on 11/7  • Return to the OR 11/16 for right hemicolectomy with partial descending colectomy colocolonic anastomosis with loop ileostomy and mid line abdominal VAC placement  • Continue local wound care; WC following  • 12/19:  Ileostomy = 1650ml, radha tube = 30ml, VICTORINO drain = 25ml; U/O = 1350  • D/w Rere Messina from [facility] 98  = if ileostomy is >2L for 2 days in a row then to give IVF  • CT  Abd/pelvis 12/17 = loculated collection along splenic recess  Has perisplenic hilum collection as well --> to go to IR 12/20 for drain placement in both areas     Acute cholecystitis  • s/p percutaneous tube placement 12/8 with tube check on 12/12  • Currently draining bloody discharge  • IR evaluated 12/14 states perc radha tube drainage may remain bloody for several days  • No further abdominal pain  • GI following due to LFT elevation  • Today, AST/TB/DB/AP about the same      Bacteremia/abdominal abscess  • ID now following due to worsening leukocytosis and restarted Ertapenem    • Follow-up blood cultures from 12/17 = NG at 48hrs    • CT abdomen and pelvis as above  • VICTORINO drain currently with thick milky drainage     Hypertension  • On Norvasc 5mg BID  • stable     Diabetes mellitus  • On Lantus 12U qhs/Lispro 6U TID  • Gave Lispro 6U 12/19 since NPO after midnight  • Back to 12U tonight and skip Lispro dinner since he may not be able to eat much after procedure and will eval early AM 12/21 what dose to give for breakfast   • Continue diabetic diet and QID Accuchecks with SSI     Acute on chronic renal failure  • Stage III; baseline 1 4  • Lisinopril currently on hold  • Stable at 1 19 12/19  • BMP AM     Anemia  • Multifactorial due to recent infection, surgery, CKD stage III  • Transfused on 12/15 for hemoglobin 7 3 and 12/16/22 for hgb of 8   • Hgb currently 8 7      Atypical chest pain  • With elevation in troponin/EKG changes  • Cardiology cleared pt for discharge  • Did not recommend any further work up  • On 12/15 had CP = Cards saw and felt was M/S     Situational depression  • Neuropsychology consulted  • Patient not interested in medications at this point in time     Malnutrition  • Recommend dietician consultation to optimize wound healing  • Glucerna makes him nauseated  • Breads etc make him too full  • On 12/15, ordered double protein  • NPO today for IR     Pleural effusion  • CXR on 12/14 showed small left pleural effusion and was suspect for CHF  • ECHO 11/9/22 = LVEF 55%, unable to assess diastolic function, mild TR  • No SOB and sats are stable on RA  • Will watch for now        Discharge date:  Team       The above assessment and plan was reviewed and updated as determined by my evaluation of the patient on 12/20/2022      Labs:   Results from last 7 days   Lab Units 12/19/22  0536 12/17/22  0632   WBC Thousand/uL 12 82* 18 24*   HEMOGLOBIN g/dL 8 7* 9 3*   HEMATOCRIT % 27 2* 29 2*   PLATELETS Thousands/uL 332 294     Results from last 7 days   Lab Units 12/19/22  0536 12/17/22  1300   SODIUM mmol/L 136 136   POTASSIUM mmol/L 3 5 3 7   CHLORIDE mmol/L 105 104   CO2 mmol/L 24 25   BUN mg/dL 16 14   CREATININE mg/dL 1 19 1 37*   CALCIUM mg/dL 8 4 8 4         Results from last 7 days   Lab Units 12/19/22  0841   INR  1 12     Results from last 7 days   Lab Units 12/20/22  1045 12/20/22  0916 12/20/22  0616   POC GLUCOSE mg/dl 129 126 108       Review of Scheduled Meds:  Current Facility-Administered Medications   Medication Dose Route Frequency Provider Last Rate   • acetaminophen  975 mg Oral Q8H [address] GAURANG Keith     • amLODIPine  5 mg Oral BID GAURANG Keith     • aspirin  81 mg Oral Daily GAURANG Keith     • atorvastatin  40 mg Oral Daily GAURANG Keith     • calcium carbonate  500 mg Oral BID PRN GAURANG Painter     • cholecalciferol  400 Units Oral Daily GAURANG Keith     • ertapenem  1,000 mg Intravenous Q24H Moraima Valdez MD 1,000 mg (12/19/22 2005)   • heparin (porcine)  5,000 Units Subcutaneous Q8H [address] GAURANG Keith     • insulin glargine  12 Units Subcutaneous HS GAURANG Hinton     • insulin lispro  1-5 Units Subcutaneous HS GAURANG Keith     • insulin lispro  1-6 Units Subcutaneous TID AC GAURANG Keith     • insulin lispro  6 Units Subcutaneous TID With Meals GAURANG Hinton     • iohexol  50 mL Oral 90 min pre-procedure Moraima Valdez MD     • melatonin  3 mg Oral HS GAURANG Keith     • methocarbamol  500 mg Oral BID GAURANG Painter     • multivitamin-minerals  1 tablet Oral Daily GAURANG Keith     • ondansetron  4 mg Oral Q6H PRN GAURANG Painter     • oxyCODONE  10 mg Oral Q6H PRN GAURANG Painter     • oxyCODONE  5 mg Oral Q6H PRN GAURANG Keith     • pantoprazole  40 mg Oral BID  GAURANG Keith     • simethicone  80 mg Oral 4x Daily (with meals and at bedtime) GAURANG Painter     • tamsulosin  0 4 mg Oral Daily With Dinner GAURANG Painter         Subjective/ HPI: Patient seen and examined  Patients overnight issues or events were reviewed with nursing or staff during rounds or morning huddle session  New or overnight issues include the following: To go to IR today for drain placement  ROS:   A 10 point ROS was performed; negative except as noted above       *Labs /Radiology studies reviewed  *Medications reviewed and reconciled as needed  *Please refer to order section for additional ordered labs studies  *Case discussed with primary attending during morning huddle case rounds    Physical Examination:  Vitals:   Vitals:    12/19/22 1348 12/19/22 1709 12/19/22 2026 12/20/22 0558   BP: 157/85 147/88 102/60 148/64   BP Location: Left arm  Left arm Left arm   Pulse: 90  94 85   Resp: 17  16 18   Temp: 98 °F (36 7 °C)  98 °F (36 7 °C) 98 5 °F (36 9 °C)   TempSrc: Oral  Oral Oral   SpO2: 98%  95% 96%   Weight:       Height:           General Appearance: no distress, conversive  HEENT:  External ear normal   Nose normal w/o drainage  Mucous membranes are moist  Oropharynx is clear  Conjunctiva clear w/o icterus or redness  Neck:  Supple, normal ROM  Lungs: BBS without crackles/wheeze/rhonchi; respirations unlabored with normal inspiratory/expiratory effort  No retractions noted  On RA  CV: regular rate and rhythm; no rubs/murmurs/gallops, PMI normal   ABD: soft; ND; +BS  Has some mild tenderness midline incision  VICTORINO with milky light tan drainage and perc radha tube with dark bloody drainage  EXT: no edema  Skin: normal turgor, normal texture, no rashes  Psych: affect normal, mood normal  Neuro: AAO      The above physical exam was reviewed and updated as determined by my evaluation of the patient on 12/20/2022  Invasive Devices     Central Venous Catheter Line  Duration           Port A Cath 03/10/22 Right Chest 285 days          Drain  Duration           Ileostomy LUQ 32 days    Abscess Drain Abdomen 11 days    Cholecystostomy Tube 7 days                   VTE Pharmacologic Prophylaxis: Heparin  Code Status: Level 1 - Full Code  Current Length of Stay: 6 day(s)      Total time spent:  30 minutes with more than 50% spent counseling/coordinating care  Counseling includes discussion with patient re: progress  and discussion with patient of his/her current medical state/information  Coordination of patient's care was performed in conjunction with primary service  Time invested included review of patient's labs, vitals, and management of their comorbidities with continued monitoring   In addition, this patient was discussed with medical team including physician and advanced extenders  The care of the patient was extensively discussed and appropriate treatment plan was formulated unique for this patient  Medical decision making for the day was made by supervising physician unless otherwise noted in their attestation statement  ** Please Note:  voice to text software may have been used in the creation of this document   Although proof errors in transcription or interpretation are a potential of such software**

## 2022-12-20 NOTE — PROGRESS NOTES
12/20/22 0830   Pain Assessment   Pain Assessment Tool 0-10   Pain Score No Pain   Assessment   Treatment Assessment Attempted to see pt at scheduled 0830 session  Pt with morning BS of 106 and has been made NPO in preparation to go to IR later this afternoon  Provided pt with emotional support  Offered bedside tx, but pt declined  Encouraged pt to reposition in bed to reduce 2* complications and risk of skin breakdown  Pt declines at this time but was agreeable to offload heels with pillows  Educated pt on concerns of risk of skin breakdown, especially near carolyn protuberences  Pt verbalizes understanding  Pt will continue to benefit from skilled OT services with focus on act julia, endurance, functional txfers, and ADL retraining  OT Therapy Minutes   OT Time In 0830   OT Time Out 0845   OT Total Time (minutes) 15   OT Mode of treatment - Individual (minutes) 15   OT Mode of treatment - Concurrent (minutes) 0   OT Mode of treatment - Group (minutes) 0   OT Mode of treatment - Co-treat (minutes) 0   OT Mode of Treatment - Total time(minutes) 15 minutes   OT Cumulative Minutes 375   Therapy Time missed   Time missed?  No

## 2022-12-20 NOTE — PROGRESS NOTES
Progress Note- Cari Gates 61 y o  male MRN: [id number]    Unit/Bed#: -83 Encounter: [id number]      Assessment and Plan:  Cari Gates is a 61 y o  old male with PMH of metastatic colon cancer to the liver for which he underwent an ex lap with bowel resection and ileostomy, partial hepatectomy complicated by intra-abdominal abscess, patient also with acute cholecystitis status post percutaneous cholecystostomy tube placement     Gastroenterology has been consulted for assistance with management of elevated alkaline phosphatase     Elevated alkaline phosphatase  • Suspect secondary to metastatic liver lesions, but have infiltrative component versus biliary obstruction  However it is reassuring that the bilirubin is downtrending  • Alkaline phosphatase isoenzymes are pending  • GGT elevated, most likely liver cirrhosis  • Antimitochondrial antibody negative  · Patient has worsening bilirubin can consider getting MRCP  However, at this time we will continue to monitor to a conservative approach   · CT abdomen pelvis shows persistent intra-abdominal abscess now undergoing drainage by IR    Colon cancer with metastatic disease  • Patient underwent bowel resection and has a diverting ileostomy  • He also underwent partial hepatectomy  • Currently complicated by intra-abdominal abscess for which he is on ertapenem  He has persistent intra abdominal fluid collection and is going for drain placement by IR today     Acute cholecystitis  • Status post percutaneous cholecystostomy tube placement      Disposition: GI will continue to follow peripherally  ______________________________________________________________________    Subjective:     Patient denies any abdominal pain    He states that he ate a regular diet yesterday without any issues    Medication Administration - last 24 hours from 12/19/2022 1618 to 12/20/2022 1618       Date/Time Order Dose Route Action Action by     12/20/2022 1441 EST acetaminophen (TYLENOL) tablet 975 mg 975 mg Oral Not Given Watkins Late, RN     06/08/0730 0600 EST acetaminophen (TYLENOL) tablet 975 mg 0 mg Oral Hold [facility], RN     12/19/2022 2115 EST acetaminophen (TYLENOL) tablet 975 mg 975 mg Oral Given [facility] Marty, RN     12/20/2022 1341 EST amLODIPine (NORVASC) tablet 5 mg 5 mg Oral Not Given Watkins Late, RN     26/53/1222 1709 EST amLODIPine (NORVASC) tablet 5 mg 5 mg Oral Given Torsten Velasquez, RN     12/20/2022 1341 EST aspirin chewable tablet 81 mg 81 mg Oral Not Given Watkins Late, RN     85/54/0943 1341 EST atorvastatin (LIPITOR) tablet 40 mg 40 mg Oral Not Given Watkins Late, RN     03/23/6161 1341 EST cholecalciferol (VITAMIN D3) tablet 400 Units 400 Units Oral Not Given Watkins Late, RN     05/48/2515 1400 EST heparin (porcine) subcutaneous injection 5,000 Units 0 Units Subcutaneous Hold [facility]     12/20/2022 0600 EST heparin (porcine) subcutaneous injection 5,000 Units 0 Units Subcutaneous Hold [facility]     12/19/2022 2116 EST heparin (porcine) subcutaneous injection 5,000 Units 5,000 Units Subcutaneous Given [address], RN     12/20/2022 1341 EST methocarbamol (ROBAXIN) tablet 500 mg 500 mg Oral Not Given Watkins Late, RN     95/01/0367 1709 EST methocarbamol (ROBAXIN) tablet 500 mg 500 mg Oral Given Torsten Velasquez, RN     12/20/2022 1341 EST multivitamin-minerals (CENTRUM) tablet 1 tablet 1 tablet Oral Not Given Watkins Late, RN     42/38/6437 1559 EST pantoprazole (PROTONIX) EC tablet 40 mg 40 mg Oral Not Given Juarez Devine, RN     12/20/2022 0700 EST pantoprazole (PROTONIX) EC tablet 40 mg 0 mg Oral Hold [facility], RN     12/19/2022 1641 EST pantoprazole (PROTONIX) EC tablet 40 mg 40 mg Oral Given Torsten Marshalling, RN     12/20/2022 1600 EST tamsulosin (FLOMAX) capsule 0 4 mg 0 4 mg Oral Not Given Juarez Devine RN     12/19/2022 1641 EST tamsulosin (FLOMAX) capsule 0 4 mg 0 4 mg Oral Given Ana Laura Dobbins, RN     12/19/2022 1715 EST calcium carbonate (TUMS) chewable tablet 500 mg 500 mg Oral Given Ana Laura Dobbins, RN     12/19/2022 2115 EST oxyCODONE (ROXICODONE) immediate release tablet 10 mg 10 mg Oral Given [address]  [place], RN     12/20/2022 1559 EST insulin lispro (HumaLOG) 100 units/mL subcutaneous injection 1-6 Units 1 Units Subcutaneous Not Given [facility], RN     12/20/2022 1241 EST insulin lispro (HumaLOG) 100 units/mL subcutaneous injection 1-6 Units 1 Units Subcutaneous Not Given Dixie Salt, RN     77/34/5719 7340 EST insulin lispro (HumaLOG) 100 units/mL subcutaneous injection 1-6 Units 1 Units Subcutaneous Not Given [address]  [place], RN     12/19/2022 1642 EST insulin lispro (HumaLOG) 100 units/mL subcutaneous injection 1-6 Units 1 Units Subcutaneous Given Ana Laura Dobbins, RN     12/19/2022 2134 EST insulin lispro (HumaLOG) 100 units/mL subcutaneous injection 1-5 Units 1 Units Subcutaneous Not Given [facility] [address]  [place], RN     12/19/2022 2009 EST melatonin tablet 3 mg 3 mg Oral Given [facility], [facility]     12/20/2022 1344 EST simethicone (MYLICON) chewable tablet 80 mg 80 mg Oral Not Given Dixie Salt, RN     99/51/9268 1341 EST simethicone (MYLICON) chewable tablet 80 mg 80 mg Oral Not Given Dixie Salt, RN     37/02/9915 2134 EST simethicone (MYLICON) chewable tablet 80 mg 80 mg Oral Given [address]  [place], RN     12/19/2022 1641 EST simethicone (MYLICON) chewable tablet 80 mg 80 mg Oral Given Ana Laura Dobbins, RN     12/19/2022 2005 EST ertapenem (INVanz) 1,000 mg in sodium chloride 0 9 % 50 mL IVPB 1,000 mg Intravenous New Bag [facility] Attdannyoe, RN     12/20/2022 1340 EST insulin lispro (HumaLOG) 100 units/mL subcutaneous injection 6 Units 6 Units Subcutaneous Not Given Dixie Salt, RN     86/75/0191 0529 EST insulin lispro (HumaLOG) 100 units/mL subcutaneous injection 6 Units 6 Units Subcutaneous Not Given Dixie Mathew RN     05/39/8880 1642 EST insulin lispro (HumaLOG) 100 units/mL subcutaneous injection 6 Units 6 Units Subcutaneous Given Kristina Torres RN     12/19/2022 2118 EST insulin glargine (LANTUS) subcutaneous injection 6 Units 0 06 mL 6 Units Subcutaneous Given Lyssa Michele RN          Objective:     Vitals: Blood pressure 136/68, pulse 82, temperature 98 4 °F (36 9 °C), temperature source Oral, resp  rate 16, height 5' 10" (1 778 m), weight 104 kg (230 lb), SpO2 96 %  ,Body mass index is 33 kg/m²  Intake/Output Summary (Last 24 hours) at 12/20/2022 1618  Last data filed at 12/20/2022 9549  Gross per 24 hour   Intake 0 ml   Output 2315 ml   Net -2315 ml       Physical Exam:   General Appearance: Awake and alert, in no acute distress  Abdomen: Soft, non-tender, non-distended; bowel sounds normal; no masses or no organomegaly    Invasive Devices     Central Venous Catheter Line  Duration           Port A Cath 03/10/22 Right Chest 285 days          Drain  Duration           Ileostomy LUQ 33 days    Abscess Drain Abdomen 11 days    Cholecystostomy Tube 8 days                Lab Results:  No results displayed because visit has over 200 results  Imaging Studies: I have personally reviewed pertinent imaging studies

## 2022-12-20 NOTE — PROGRESS NOTES
12/20/22 1035   Restrictions/Precautions   Precautions Fall Risk;Contact/isolation;Supervision on toilet/commode   Cognition   Arousal/Participation Cooperative   Subjective   Subjective pt reported feeling happy with therapy progress, but frustrated he is NPO until later today when he needs to have 2 drains placed in IR  As a result, he doens't feel up to doing much today   Transfer Bed/Chair/Wheelchair   Findings boosted pt up in bed with 2nd person, having pt bend his legs and push with his legs as much as possible  Upon lowering blankets, we noticed that the ostomy bag was leaking; notified RN who then came to address the ostomy bag  Before she arrived, worked on sitting upright in bed (used HOB elevated and then pt able to sit forward slighlty from there) to remove his shirt (soiled from ostomy leaking)  worked on repositioning  in bed for him to use urinal as well  Assessment   Treatment Assessment Pt cont to remain very deconditioned due to extensive medical status  Initially we were going to try partial sidelying for pressure relief, but then ostomy bag was leaking  To cont to attempt sessions, despite being NPO for procedure later today  PT Barriers   Physical Impairment Decreased strength;Decreased range of motion;Decreased endurance; Impaired balance;Decreased mobility   Functional Limitation Car transfers; Ramp negotiation;Stair negotiation;Standing;Transfers; Walking; Wheelchair management   Plan   Treatment/Interventions Functional transfer training;LE strengthening/ROM; Therapeutic exercise; Endurance training;Patient/family training;Bed mobility;Gait training;Equipment eval/education   Recommendation   PT Discharge Recommendation Home with home health rehabilitation   PT Therapy Minutes   PT Time In 1035   PT Time Out 1050   PT Total Time (minutes) 15   PT Mode of treatment - Individual (minutes) 15   PT Mode of treatment - Concurrent (minutes) 0   PT Mode of treatment - Group (minutes) 0   PT Mode of treatment - Co-treat (minutes) 0   PT Mode of Treatment - Total time(minutes) 15 minutes   PT Cumulative Minutes 280   Therapy Time missed   Time missed?  No

## 2022-12-20 NOTE — QUICK NOTE
Considering it is 5:45 PM and pt yet to go to IR, likely will be very late eating if not much at all tonight    For this reason, will cut Lantus back to 6U tonight

## 2022-12-21 LAB
ALBUMIN SERPL BCP-MCNC: 1.8 G/DL (ref 3.5–5)
ALP BONE CFR SERPL: 52 % (ref 12–68)
ALP INTEST CFR SERPL: 0 % (ref 0–18)
ALP LIVER CFR SERPL: 48 % (ref 13–88)
ALP SERPL-CCNC: 1093 U/L (ref 46–116)
ALP SERPL-CCNC: 1102 IU/L (ref 44–121)
ALT SERPL W P-5'-P-CCNC: 17 U/L (ref 12–78)
ANION GAP SERPL CALCULATED.3IONS-SCNC: 7 MMOL/L (ref 4–13)
AST SERPL W P-5'-P-CCNC: 57 U/L (ref 5–45)
BASOPHILS # BLD AUTO: 0.04 THOUSANDS/ÂΜL (ref 0–0.1)
BASOPHILS NFR BLD AUTO: 0 % (ref 0–1)
BILIRUB SERPL-MCNC: 1.64 MG/DL (ref 0.2–1)
BUN SERPL-MCNC: 18 MG/DL (ref 5–25)
CALCIUM ALBUM COR SERPL-MCNC: 10.5 MG/DL (ref 8.3–10.1)
CALCIUM SERPL-MCNC: 8.7 MG/DL (ref 8.3–10.1)
CHLORIDE SERPL-SCNC: 108 MMOL/L (ref 96–108)
CO2 SERPL-SCNC: 23 MMOL/L (ref 21–32)
CREAT SERPL-MCNC: 1.28 MG/DL (ref 0.6–1.3)
EOSINOPHIL # BLD AUTO: 0 THOUSAND/ÂΜL (ref 0–0.61)
EOSINOPHIL NFR BLD AUTO: 0 % (ref 0–6)
ERYTHROCYTE [DISTWIDTH] IN BLOOD BY AUTOMATED COUNT: 17.7 % (ref 11.6–15.1)
GFR SERPL CREATININE-BSD FRML MDRD: 61 ML/MIN/1.73SQ M
GLUCOSE P FAST SERPL-MCNC: 226 MG/DL (ref 65–99)
GLUCOSE SERPL-MCNC: 117 MG/DL (ref 65–140)
GLUCOSE SERPL-MCNC: 195 MG/DL (ref 65–140)
GLUCOSE SERPL-MCNC: 206 MG/DL (ref 65–140)
GLUCOSE SERPL-MCNC: 206 MG/DL (ref 65–140)
GLUCOSE SERPL-MCNC: 226 MG/DL (ref 65–140)
GLUCOSE SERPL-MCNC: 252 MG/DL (ref 65–140)
HCT VFR BLD AUTO: 28 % (ref 36.5–49.3)
HGB BLD-MCNC: 8.7 G/DL (ref 12–17)
IMM GRANULOCYTES # BLD AUTO: 0.1 THOUSAND/UL (ref 0–0.2)
IMM GRANULOCYTES NFR BLD AUTO: 1 % (ref 0–2)
LYMPHOCYTES # BLD AUTO: 1.79 THOUSANDS/ÂΜL (ref 0.6–4.47)
LYMPHOCYTES NFR BLD AUTO: 13 % (ref 14–44)
MCH RBC QN AUTO: 26.8 PG (ref 26.8–34.3)
MCHC RBC AUTO-ENTMCNC: 31.1 G/DL (ref 31.4–37.4)
MCV RBC AUTO: 86 FL (ref 82–98)
MONOCYTES # BLD AUTO: 1.28 THOUSAND/ÂΜL (ref 0.17–1.22)
MONOCYTES NFR BLD AUTO: 9 % (ref 4–12)
NEUTROPHILS # BLD AUTO: 10.41 THOUSANDS/ÂΜL (ref 1.85–7.62)
NEUTS SEG NFR BLD AUTO: 77 % (ref 43–75)
NRBC BLD AUTO-RTO: 0 /100 WBCS
PLATELET # BLD AUTO: 376 THOUSANDS/UL (ref 149–390)
PMV BLD AUTO: 9.8 FL (ref 8.9–12.7)
POTASSIUM SERPL-SCNC: 4 MMOL/L (ref 3.5–5.3)
PROT SERPL-MCNC: 8.4 G/DL (ref 6.4–8.4)
RBC # BLD AUTO: 3.25 MILLION/UL (ref 3.88–5.62)
SODIUM SERPL-SCNC: 138 MMOL/L (ref 135–147)
WBC # BLD AUTO: 13.62 THOUSAND/UL (ref 4.31–10.16)

## 2022-12-21 RX ORDER — OXYCODONE HYDROCHLORIDE 5 MG/1
2.5 TABLET ORAL
Status: DISCONTINUED | OUTPATIENT
Start: 2022-12-21 | End: 2023-01-17 | Stop reason: HOSPADM

## 2022-12-21 RX ADMIN — SIMETHICONE 80 MG: 80 TABLET, CHEWABLE ORAL at 08:32

## 2022-12-21 RX ADMIN — HEPARIN SODIUM 5000 UNITS: 5000 INJECTION INTRAVENOUS; SUBCUTANEOUS at 14:01

## 2022-12-21 RX ADMIN — Medication 1 TABLET: at 08:32

## 2022-12-21 RX ADMIN — MELATONIN TAB 3 MG 3 MG: 3 TAB at 19:52

## 2022-12-21 RX ADMIN — INSULIN GLARGINE 12 UNITS: 100 INJECTION, SOLUTION SUBCUTANEOUS at 21:36

## 2022-12-21 RX ADMIN — INSULIN LISPRO 2 UNITS: 100 INJECTION, SOLUTION INTRAVENOUS; SUBCUTANEOUS at 08:31

## 2022-12-21 RX ADMIN — HEPARIN SODIUM 5000 UNITS: 5000 INJECTION INTRAVENOUS; SUBCUTANEOUS at 06:08

## 2022-12-21 RX ADMIN — SIMETHICONE 80 MG: 80 TABLET, CHEWABLE ORAL at 21:36

## 2022-12-21 RX ADMIN — AMLODIPINE BESYLATE 5 MG: 5 TABLET ORAL at 08:36

## 2022-12-21 RX ADMIN — CHOLECALCIFEROL TAB 10 MCG (400 UNIT) 400 UNITS: 10 TAB at 08:34

## 2022-12-21 RX ADMIN — METHOCARBAMOL 500 MG: 500 TABLET ORAL at 17:27

## 2022-12-21 RX ADMIN — PANTOPRAZOLE SODIUM 40 MG: 40 TABLET, DELAYED RELEASE ORAL at 06:07

## 2022-12-21 RX ADMIN — AMLODIPINE BESYLATE 5 MG: 5 TABLET ORAL at 17:27

## 2022-12-21 RX ADMIN — INSULIN LISPRO 3 UNITS: 100 INJECTION, SOLUTION INTRAVENOUS; SUBCUTANEOUS at 11:43

## 2022-12-21 RX ADMIN — ACETAMINOPHEN 975 MG: 325 TABLET, FILM COATED ORAL at 21:35

## 2022-12-21 RX ADMIN — SIMETHICONE 80 MG: 80 TABLET, CHEWABLE ORAL at 17:27

## 2022-12-21 RX ADMIN — INSULIN LISPRO 6 UNITS: 100 INJECTION, SOLUTION INTRAVENOUS; SUBCUTANEOUS at 11:43

## 2022-12-21 RX ADMIN — ASPIRIN 81 MG CHEWABLE TABLET 81 MG: 81 TABLET CHEWABLE at 08:32

## 2022-12-21 RX ADMIN — OXYCODONE HYDROCHLORIDE 10 MG: 10 TABLET ORAL at 06:07

## 2022-12-21 RX ADMIN — ACETAMINOPHEN 975 MG: 325 TABLET, FILM COATED ORAL at 06:07

## 2022-12-21 RX ADMIN — ATORVASTATIN CALCIUM 40 MG: 40 TABLET, FILM COATED ORAL at 08:33

## 2022-12-21 RX ADMIN — INSULIN LISPRO 1 UNITS: 100 INJECTION, SOLUTION INTRAVENOUS; SUBCUTANEOUS at 21:36

## 2022-12-21 RX ADMIN — TAMSULOSIN HYDROCHLORIDE 0.4 MG: 0.4 CAPSULE ORAL at 17:27

## 2022-12-21 RX ADMIN — ERTAPENEM SODIUM 1000 MG: 1 INJECTION, POWDER, LYOPHILIZED, FOR SOLUTION INTRAMUSCULAR; INTRAVENOUS at 18:38

## 2022-12-21 RX ADMIN — OXYCODONE HYDROCHLORIDE 10 MG: 10 TABLET ORAL at 12:14

## 2022-12-21 RX ADMIN — INSULIN LISPRO 6 UNITS: 100 INJECTION, SOLUTION INTRAVENOUS; SUBCUTANEOUS at 17:28

## 2022-12-21 RX ADMIN — SIMETHICONE 80 MG: 80 TABLET, CHEWABLE ORAL at 11:44

## 2022-12-21 RX ADMIN — ACETAMINOPHEN 975 MG: 325 TABLET, FILM COATED ORAL at 14:01

## 2022-12-21 RX ADMIN — HEPARIN SODIUM 5000 UNITS: 5000 INJECTION INTRAVENOUS; SUBCUTANEOUS at 21:35

## 2022-12-21 RX ADMIN — METHOCARBAMOL 500 MG: 500 TABLET ORAL at 08:32

## 2022-12-21 RX ADMIN — PANTOPRAZOLE SODIUM 40 MG: 40 TABLET, DELAYED RELEASE ORAL at 17:27

## 2022-12-21 RX ADMIN — OXYCODONE HYDROCHLORIDE 10 MG: 10 TABLET ORAL at 19:51

## 2022-12-21 NOTE — PLAN OF CARE
Problem: Prexisting or High Potential for Compromised Skin Integrity  Goal: Skin integrity is maintained or improved  Description: INTERVENTIONS:  - Identify patients at risk for skin breakdown  - Assess and monitor skin integrity  - Assess and monitor nutrition and hydration status  - Monitor labs   - Assess for incontinence   - Turn and reposition patient  - Assist with mobility/ambulation  - Relieve pressure over bony prominences  - Avoid friction and shearing  - Provide appropriate hygiene as needed including keeping skin clean and dry  - Evaluate need for skin moisturizer/barrier cream  - Collaborate with interdisciplinary team   - Patient/family teaching  - Consider wound care consult   Outcome: Progressing     Problem: Potential for Falls  Goal: Patient will remain free of falls  Description: INTERVENTIONS:  - Educate patient/family on patient safety including physical limitations  - Instruct patient to call for assistance with activity   - Consult OT/PT to assist with strengthening/mobility   - Keep Call bell within reach  - Keep bed low and locked with side rails adjusted as appropriate  - Keep care items and personal belongings within reach  - Initiate and maintain comfort rounds  - Make Fall Risk Sign visible to staff  - Offer Toileting every 2 Hours, in advance of need  - Initiate/Maintain bed/chair alarm  - Obtain necessary fall risk management equipment: nonskid socks  - Apply yellow socks and bracelet for high fall risk patients  - Consider moving patient to room near nurses station  Outcome: Progressing     Problem: PAIN - ADULT  Goal: Verbalizes/displays adequate comfort level or baseline comfort level  Description: Interventions:  - Encourage patient to monitor pain and request assistance  - Assess pain using appropriate pain scale  - Administer analgesics based on type and severity of pain and evaluate response  - Implement non-pharmacological measures as appropriate and evaluate response  - Consider cultural and social influences on pain and pain management  - Notify physician/advanced practitioner if interventions unsuccessful or patient reports new pain  Outcome: Progressing     Problem: INFECTION - ADULT  Goal: Absence or prevention of progression during hospitalization  Description: INTERVENTIONS:  - Assess and monitor for signs and symptoms of infection  - Monitor lab/diagnostic results  - Monitor all insertion sites, i e  indwelling lines, tubes, and drains  - Monitor endotracheal if appropriate and nasal secretions for changes in amount and color  - [place] appropriate cooling/warming therapies per order  - Administer medications as ordered  - Instruct and encourage patient and family to use good hand hygiene technique  - Identify and instruct in appropriate isolation precautions for identified infection/condition  Outcome: Progressing     Problem: SAFETY ADULT  Goal: Patient will remain free of falls  Description: INTERVENTIONS:  - Educate patient/family on patient safety including physical limitations  - Instruct patient to call for assistance with activity   - Consult OT/PT to assist with strengthening/mobility   - Keep Call bell within reach  - Keep bed low and locked with side rails adjusted as appropriate  - Keep care items and personal belongings within reach  - Initiate and maintain comfort rounds  - Make Fall Risk Sign visible to staff  - Offer Toileting every 2 Hours, in advance of need  - Initiate/Maintain bed  chair alarm  - Obtain necessary fall risk management equipment: nonskid socks  - Apply yellow socks and bracelet for high fall risk patients  - Consider moving patient to room near nurses station  Outcome: Progressing  Goal: Maintain or return to baseline ADL function  Description: INTERVENTIONS:  -  Assess patient's ability to carry out ADLs; assess patient's baseline for ADL function and identify physical deficits which impact ability to perform ADLs (bathing, care of mouth/teeth, toileting, grooming, dressing, etc )  - Assess/evaluate cause of self-care deficits   - Assess range of motion  - Assess patient's mobility; develop plan if impaired  - Assess patient's need for assistive devices and provide as appropriate  - Encourage maximum independence but intervene and supervise when necessary  - Involve family in performance of ADLs  - Assess for home care needs following discharge   - Consider OT consult to assist with ADL evaluation and planning for discharge  - Provide patient education as appropriate  Outcome: Progressing  Goal: Maintains/Returns to pre admission functional level  Description: INTERVENTIONS:  - Perform BMAT or MOVE assessment daily    - Set and communicate daily mobility goal to care team and patient/family/caregiver  - Collaborate with rehabilitation services on mobility goals if consulted  - Perform Range of Motion 3 times a day  - Reposition patient every 2 hours    - Dangle patient 3 times a day  - Stand patient 3 times a day  - Ambulate patient 3 times a day  - Out of bed to chair 3 times a day   - Out of bed for meals 3 times a day  - Out of bed for toileting  - Record patient progress and toleration of activity level   Outcome: Progressing     Problem: DISCHARGE PLANNING  Goal: Discharge to home or other facility with appropriate resources  Description: INTERVENTIONS:  - Identify barriers to discharge w/patient and caregiver  - Arrange for needed discharge resources and transportation as appropriate  - Identify discharge learning needs (meds, wound care, etc )  - Arrange for interpretive services to assist at discharge as needed  - Refer to Case Management Department for coordinating discharge planning if the patient needs post-hospital services based on physician/advanced practitioner order or complex needs related to functional status, cognitive ability, or social support system  Outcome: Progressing     Problem: Nutrition/Hydration-ADULT  Goal: Nutrient/Hydration intake appropriate for improving, restoring or maintaining nutritional needs  Description: Monitor and assess patient's nutrition/hydration status for malnutrition  Collaborate with interdisciplinary team and initiate plan and interventions as ordered  Monitor patient's weight and dietary intake as ordered or per policy  Utilize nutrition screening tool and intervene as necessary  Determine patient's food preferences and provide high-protein, high-caloric foods as appropriate       INTERVENTIONS:  - Monitor oral intake, urinary output, labs, and treatment plans  - Assess nutrition and hydration status and recommend course of action  - Evaluate amount of meals eaten  - Assist patient with eating if necessary   - Allow adequate time for meals  - Recommend/ encourage appropriate diets, oral nutritional supplements, and vitamin/mineral supplements  - Order, calculate, and assess calorie counts as needed  - Recommend, monitor, and adjust tube feedings and TPN/PPN based on assessed needs  - Assess need for intravenous fluids  - Provide specific nutrition/hydration education as appropriate  - Include patient/family/caregiver in decisions related to nutrition  Outcome: Progressing     Problem: MOBILITY - ADULT  Goal: Maintain or return to baseline ADL function  Description: INTERVENTIONS:  -  Assess patient's ability to carry out ADLs; assess patient's baseline for ADL function and identify physical deficits which impact ability to perform ADLs (bathing, care of mouth/teeth, toileting, grooming, dressing, etc )  - Assess/evaluate cause of self-care deficits   - Assess range of motion  - Assess patient's mobility; develop plan if impaired  - Assess patient's need for assistive devices and provide as appropriate  - Encourage maximum independence but intervene and supervise when necessary  - Involve family in performance of ADLs  - Assess for home care needs following discharge   - Consider OT consult to assist with ADL evaluation and planning for discharge  - Provide patient education as appropriate  Outcome: Progressing  Goal: Maintains/Returns to pre admission functional level  Description: INTERVENTIONS:  - Perform BMAT or MOVE assessment daily    - Set and communicate daily mobility goal to care team and patient/family/caregiver  - Collaborate with rehabilitation services on mobility goals if consulted  - Perform Range of Motion 3 times a day  - Reposition patient every 2 hours    - Dangle patient 3 times a day  - Stand patient 3 times a day  - Ambulate patient 3 times a day  - Out of bed to chair 3 times a day   - Out of bed for meals 3 times a day  - Out of bed for toileting  - Record patient progress and toleration of activity level   Outcome: Progressing

## 2022-12-21 NOTE — NURSING NOTE
IR procedure time changed and patient diabetic with being NPO, Dr Melida Gomez from GI notified and new order noted to begin fluids of D5W in 1/2 NSS w/ 20mEq KCl

## 2022-12-21 NOTE — PROGRESS NOTES
Progress Note -Interventional Radiology  Danielaitz Paul 61 y o  male MRN: [id number]  Unit/Bed#: [facility] 490-76 Encounter: [id number]    Assessment/Plan:    44-year-old male with multiple intra-abdominal fluid collections following recent multiple abdominal surgeries due  to metastatic colon cancer  Left Back Perisplenic Abscess Drain  - output 50 mL since placement yesterday, gray purulent  -Site appears well  -Continue tube care as ordered  -Tube check follow-up ordered  -Contact IR with any questions or concerns    Left abdominal abscess drain  - output 20 mL since placement yesterday, purulent  -Site appears well  -Continue tube care as ordered  -Tube check follow-up ordered  -Contact IR with any questions or concerns    Cholecystostomy tube  -Sanguinous and bilious drainage  -Continue tube care as ordered  -Minimal output  -Do not anticipate drain removal until resolution of other infections  -6-week tube check follow-up  -Contact IR with any questions or concerns    Midline abscess drain  -20 mL of purulent output last 24 hours  -Continue tube care as ordered  -Tube check followed up ordered  -Contact IR with any questions or concerns        Medical Problems     Problem List     * (Principal) Malignant neoplasm of transverse colon ([facility] [address] )    Type 2 diabetes mellitus without complication, with long-term current use of insulin ([facility] )    Lab Results   Component Value Date    HGBA1C 8 0 (H) 09/26/2022       Recent Labs     12/20/22  1559 12/20/22  2021 12/21/22  0700 12/21/22  0831   POCGLU 147* 148* 195* 206*       Blood Sugar Average: Last 72 hrs:  (P) 805 [id number]        Benign essential hypertension    Mixed hyperlipidemia    Erectile dysfunction    Low testosterone    Obesity (BMI 30-39  9)    Testicular hypogonadism    Incarcerated umbilical hernia    Overview Signed 3/26/2019  2:10 PM by Augustina Thakkar MD     Added automatically from request for surgery [id number]         Left ureteral calculus    Type 2 diabetes mellitus with hyperlipidemia [facility])    Lab Results   Component Value Date    HGBA1C 8 0 (H) 09/26/2022       Recent Labs     12/20/22  1559 12/20/22 2021 12/21/22  0700 12/21/22  0831   POCGLU 147* 148* 195* 206*       Blood Sugar Average: Last 72 hrs:  (P) 736 [id number]        Colonic mass    Microcytic anemia    Hypokalemia    Transaminitis    Thrombocytosis    Iron deficiency anemia, unspecified    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)    Colon cancer metastasized to liver (HCC)    Colostomy prolapse (HCC)    Other fatigue    Cervical radiculopathy    Encephalopathy    Flash pulmonary edema (HCC)    MR (mitral regurgitation)    Bacteremia    ESBL (extended spectrum beta-lactamase) producing bacteria infection    Acute respiratory failure with hypoxia (HCC)    Severe protein-calorie malnutrition ([address] )    Malnutrition Findings:                                 BMI Findings: Body mass index is 33 kg/m²  Acute kidney injury ([address] )    Sepsis ([address] )             Subjective: Leobardo Camarena is a 61 y o  male who presented with multiple abdominal collections after recent multiple abdominal surgeries secondary to colon cancer  Today the patient reports that he did have some postoperative pain yesterday which was managed with medications provided in the rehab unit  Today the patient continues to look chronically unwell however states that his pain is improved today  He is working with rehab at the time of my assessment  He denies any new fevers or chills overnight or new or worsening abdominal pain       Objective:    Vitals:  /80   Pulse (!) 112   Temp 98 1 °F (36 7 °C) (Oral)   Resp 18   Ht 5' 10" (1 778 m)   Wt 104 kg (230 lb)   SpO2 94%   BMI 33 00 kg/m²   Body mass index is 33 kg/m²    Weight (last 2 days)     None          I/Os:    Intake/Output Summary (Last 24 hours) at 12/21/2022 1039  Last data filed at 12/21/2022 0836  Gross per 24 hour   Intake 448 ml Output 1390 ml   Net -942 ml       Invasive Devices     Central Venous Catheter Line  Duration           Port A Cath 03/10/22 Right Chest 285 days          Drain  Duration           Ileostomy LUQ 33 days    Abscess Drain Abdomen 12 days    Cholecystostomy Tube 8 days    Abscess Drain Abdomen <1 day    Abscess Drain Back <1 day                Physical Exam  Vitals and nursing note reviewed  Constitutional:       General: He is not in acute distress  Appearance: He is well-developed, well-groomed and overweight  He is ill-appearing  HENT:      Head: Normocephalic and atraumatic  Right Ear: External ear normal       Left Ear: External ear normal       Nose: Nose normal    Eyes:      Pupils: Pupils are equal, round, and reactive to light  Cardiovascular:      Rate and Rhythm: Normal rate and regular rhythm  Heart sounds: Normal heart sounds  No murmur heard  No friction rub  No gallop  Pulmonary:      Effort: Pulmonary effort is normal  No respiratory distress  Breath sounds: Normal breath sounds  No stridor  No wheezing or rales  Abdominal:      General: Bowel sounds are normal  There is no distension  Palpations: Abdomen is soft  Tenderness: There is no abdominal tenderness  There is no guarding  Comments: Multiple appliances noted on the abdomen  Right side cholecystostomy tube  Patent and appears to be draining  Site is well-appearing  Midline abdominal surgical site appears to be well-healing  Ostomy site appears to be well and functioning  Midline IR drain site is well-appearing  Left-sided abdominal and left flank drain are also well-appearing  Purulent appearing output noted in all abscess drains   Musculoskeletal:         General: No tenderness  Normal range of motion  Cervical back: Normal range of motion and neck supple  Skin:     General: Skin is warm  Capillary Refill: Capillary refill takes less than 2 seconds     Neurological:      Mental Status: He is alert and oriented to person, place, and time  Psychiatric:         Behavior: Behavior is cooperative                       Lab Results and Cultures:   CBC with diff:   Lab Results   Component Value Date    WBC 13 62 (H) 12/21/2022    HGB 8 7 (L) 12/21/2022    HCT 28 0 (L) 12/21/2022    MCV 86 12/21/2022     12/21/2022    MCH 26 8 12/21/2022    MCHC 31 1 (L) 12/21/2022    RDW 17 7 (H) 12/21/2022    MPV 9 8 12/21/2022    NRBC 0 12/21/2022      BMP/CMP:  Lab Results   Component Value Date     05/02/2017    K 4 0 12/21/2022    K 4 1 09/26/2022     12/21/2022     09/26/2022    CO2 23 12/21/2022    CO2 27 11/16/2022    CO2 21 09/26/2022    BUN 18 12/21/2022    BUN 21 09/26/2022    CREATININE 1 28 12/21/2022    CREATININE 1 18 05/02/2017    GLUCOSE 151 (H) 11/16/2022    GLUCOSE 118 (H) 05/02/2017    CALCIUM 8 7 12/21/2022    CALCIUM 9 5 05/02/2017    AST 57 (H) 12/21/2022    AST 22 09/26/2022    ALT 17 12/21/2022    ALT 21 09/26/2022    ALKPHOS 1,093 (H) 12/21/2022    ALKPHOS 94 04/10/2017    PROT 6 2 04/10/2017    BILITOT 0 6 04/10/2017    EGFR 61 12/21/2022   ,     Coags:   Lab Results   Component Value Date    PTT 37 11/09/2022    INR 1 12 12/19/2022   ,   Results from last 7 days   Lab Units 12/19/22  0841   INR  1 12        Lipid Panel:   Lab Results   Component Value Date    CHOL 91 (L) 04/10/2017     Lab Results   Component Value Date    HDL 51 08/19/2022     Lab Results   Component Value Date    HDL 51 08/19/2022     Lab Results   Component Value Date    LDLCALC 77 08/19/2022     Lab Results   Component Value Date    TRIG 157 (H) 08/19/2022       HgbA1c:   Lab Results   Component Value Date    HGBA1C 8 0 (H) 09/26/2022    HGBA1C 8 4 (H) 08/19/2022    HGBA1C 8 7 (H) 05/27/2022       Blood Culture:   Lab Results   Component Value Date    BLOODCX No Growth at 72 hrs  12/17/2022   ,   Urinalysis:   Lab Results   Component Value Date    COLORU Yellow 12/10/2022    COLORU Yellow 04/10/2017    CLARITYU Extra Turbid 12/10/2022    SPECGRAV 1 025 12/10/2022    SPECGRAV 1 028 04/10/2017    PHUR 5 5 12/10/2022    PHUR 5 5 04/10/2017    LEUKOCYTESUR Negative 12/10/2022    LEUKOCYTESUR Negative 04/10/2017    NITRITE Negative 12/10/2022    NITRITE Negative 04/10/2017    PROTEINUA Trace 04/10/2017    GLUCOSEU 100 (1/10%) (A) 12/10/2022    KETONESU Negative 12/10/2022    KETONESU Negative 04/10/2017    BILIRUBINUR Negative 12/10/2022    BILIRUBINUR Negative 04/10/2017    BLOODU Trace (A) 12/10/2022   ,   Urine Culture:   Lab Results   Component Value Date    URINECX No Growth <1000 cfu/mL 08/23/2016   ,   Wound Culure:  No results found for: WOUNDCULT    VTE Pharmacologic Prophylaxis: Heparin      Thank you for allowing me to participate in the care of Vic Mart  Please don't hesitate to call, text, email, or TigerText with any questions  This text is generated with voice recognition software  There may be translation, syntax, or grammatical errors  If you have any questions, please contact the dictating provider

## 2022-12-21 NOTE — PROGRESS NOTES
Progress Note - Surgical Oncology  Beth Cooley 61 y o  male MRN: [id number]  Unit/Bed#: [facility] 308-80 Encounter: [id number]    Assessment:  61 y o  M with PMHx of metastatic colon cancer s/p exploratory laparotomy, hepatic segment 3 resection, transverse colectomy and reversal of loop colostomy on 38/6, complicated by an anastomotic leak requiring re-exploration, right hemicolectomy 11/16 and subsequent primary ileocolonic anastomosis with diverting loop ileostomy 11/17  Patient was readmitted for acute cholecystitis s/p percutaneous cholecystostomy tube placement on 12/8 and now s/p IR drainage of intra-abdominal fluid collections on 12/20    Afebrile  HTN  Vitals otherwise stable  WBC pending for today    Plan:  Diet as tolerated  Maintain IR drains  Continue Abx per ID - Ertapenem  Maintain perc radha tube  Continue ileostomy care and teaching  Prn analgesia  PT/OT  DVT ppx   Remainder of care per primary team    Subjective/Objective     Subjective: Patient complains of left sided abdominal pain adjacent to where the IR drain was placed yesterday  He ate dinner last night without nausea or vomiting  No fevers or chills  Objective:     Blood pressure 164/93, pulse (!) 112, temperature 98 1 °F (36 7 °C), temperature source Oral, resp  rate 18, height 5' 10" (1 778 m), weight 104 kg (230 lb), SpO2 94 %  ,Body mass index is 33 kg/m²        Intake/Output Summary (Last 24 hours) at 12/21/2022 7838  Last data filed at 12/21/2022 4945  Gross per 24 hour   Intake 248 ml   Output 890 ml   Net -642 ml       Invasive Devices     Central Venous Catheter Line  Duration           Port A Cath 03/10/22 Right Chest 285 days          Drain  Duration           Ileostomy LUQ 33 days    Abscess Drain Abdomen 12 days    Cholecystostomy Tube 8 days    Abscess Drain Abdomen <1 day    Abscess Drain Back <1 day                Physical Exam:   NAD, alert and oriented x3  Normocephalic, atraumatic  Moist mucous membranes   Norm resp effort on room air   Regular rate  Abd soft, mild distention, tenderness in left hemiabdomen  L sided VICTORINO drains with purulent thick drainage  RUQ perc radha tube with serosanguinous drainage   Midline incision with mesalt in palce  + peripheral edema  CN grossly intact   Skin is warm and dry      Lab, Imaging and other studies:  CBC: No results found for: WBC, HGB, HCT, MCV, PLT, ADJUSTEDWBC, MCH, MCHC, RDW, MPV, NRBC, CMP:   Lab Results   Component Value Date    AST 58 (H) 12/20/2022    ALT 17 12/20/2022    ALKPHOS 1,025 (H) 12/20/2022     VTE Pharmacologic Prophylaxis: Heparin  VTE Mechanical Prophylaxis: sequential compression device

## 2022-12-21 NOTE — PROGRESS NOTES
12/21/22 1430   Pain Assessment   Pain Assessment Tool 0-10   Pain Score 7   Pain Location/Orientation Orientation: Left; Location: Back   Pain Onset/Description Onset: Gradual;Frequency: Constant/Continuous   Patient's Stated Pain Goal No pain   Hospital Pain Intervention(s) Relaxation technique;Repositioned   Restrictions/Precautions   Precautions Fall Risk;Contact/isolation;Pain;Supervision on toilet/commode   Weight Bearing Restrictions No   ROM Restrictions No   Cognition   Overall Cognitive Status WFL   Arousal/Participation Cooperative   Attention Attends with cues to redirect   Orientation Level Oriented X4   Memory Within functional limits   Following Commands Follows one step commands with increased time or repetition   Sit to Lying   Type of Assistance Needed Physical assistance;Verbal cues; Adaptive equipment   Physical Assistance Level 51%-75%   Comment A to BLE and trunk 2* increased pain, increased time to complete required   Sit to Lying CARE Score 2   Lying to Sitting on Side of Bed   Type of Assistance Needed Physical assistance;Verbal cues; Adaptive equipment   Physical Assistance Level 26%-50%   Comment A to trunk with bed rails, HOB slightly elevated, increased time 2* pain     Lying to Sitting on Side of Bed CARE Score 3   Sit to Stand   Reason if not Attempted Safety concerns   Sit to Stand CARE Score 88   Bed-Chair Transfer   Reason if not Attempted Safety concerns   Chair/Bed-to-Chair Transfer CARE Score 88   Transfer Bed/Chair/Wheelchair   Adaptive Equipment Roller Walker   Car Transfer   Reason if not Attempted Safety concerns   Car Transfer CARE Score 88   Wheel 50 Feet with Two Turns   Reason if not Attempted Activity not applicable   Wheel 50 Feet with Two Turns CARE Score 9   Wheel 150 Feet   Reason if not Attempted Activity not applicable   Wheel 342 Feet CARE Score 9   Curb or Single Stair   Reason if not Attempted Safety concerns   1 Step (Curb) CARE Score 88   4 Steps   Reason if not Attempted Safety concerns   4 Steps CARE Score 88   12 Steps   Reason if not Attempted Safety concerns   12 Steps CARE Score 88   Picking Up Object   Reason if not Attempted Safety concerns   Picking Up Object CARE Score 88   Therapeutic Interventions   Strengthening seated LAQ, hip flex and ankle DF/PF EOB, 3 x10 reps   Assessment   Treatment Assessment Brief 30 min skilled PT session with increased focus on bed mobility and LE strengthening  Pt limited by increased back pain from recent drain insertion yesterday  Pt agreeable to try and stand but once EOB he felt too weak  Pt required increased A for bed mobility 2* increased pain and decreased ROM  Pt will cont to benefit from  increased act tolerance, increased bed mobility, increased gait and improved endurance  Problem List Decreased strength;Decreased range of motion;Decreased endurance; Impaired balance;Decreased mobility;Pain   Barriers to Discharge Inaccessible home environment;Decreased caregiver support   PT Barriers   Functional Limitation Car transfers; Ramp negotiation;Stair negotiation;Standing;Transfers; Walking; Wheelchair management   Plan   Treatment/Interventions Functional transfer training;LE strengthening/ROM; Therapeutic exercise; Endurance training;Patient/family training;Gait training;Bed mobility   Progress Slow progress, medical status limitations   Recommendation   PT Discharge Recommendation Home with home health rehabilitation   Equipment Recommended Walker   PT Therapy Minutes   PT Time In 1430   PT Time Out 1500   PT Total Time (minutes) 30   PT Mode of treatment - Individual (minutes) 30   PT Mode of treatment - Concurrent (minutes) 0   PT Mode of treatment - Group (minutes) 0   PT Mode of treatment - Co-treat (minutes) 0   PT Mode of Treatment - Total time(minutes) 30 minutes   PT Cumulative Minutes 370   Therapy Time missed   Time missed?  No

## 2022-12-21 NOTE — PROGRESS NOTES
PM&R PROGRESS NOTE:  Romy Weathers 61 y o  male MRN: [id number]  Unit/Bed#: -10 Encounter: [id number]        Rehabilitation Diagnosis: Impairment of mobility, safety and Activities of Daily Living (ADLs) due to Neurologic Conditions:  03 8  Neuromuscular Disorders  Etiology: Critical Illness Myopathy    HPI: Moose Rehman is a 61 y o  male with a medical history of HTN, HLD, GERD, and ventral hernia that presented to [facility] on 11/7 for an elective surgical procedure due to metastatic colon adenocarcinoma by Dr Joselyn Leiva  Patient [facility] to [facility]/on 2/22 after CT abdomen showed transverse colonic apple core lesion and inumberable hepatic metastases  MRI revealed multiple hepatic metastasis with smaller lesions in left lobe and larger lesions in right lobe  Patient completed chemotherapy, colostomy at that time  On 11/7 patient underwent exploratory laparotomy, segment 3 liver resection, ablation, intraoperative ultrasound, and colostomy reversal  This was c/b left upper quadrant hematoma, imaging showed suspected leak from transverse colon anastomosis  On 11/16, patient underwent exp  Lap which revealed LUQ infected hematoma, defect in transverse colon anastomosis with extravasation of succus  Massively dilated cecum requiring resection  He had a right hemicolectomy, left in discontinuity and temporary abdominal closure  On 11/17, re-exploration, partial descending colectomy, primary colonic anastomosis created with diverting loop ileostomy and midline wound VAC placement  He completed course of IV abx for ESBL bacteremia from abdominal abscess  Midline abdominal incision wet to dry dressings and packing to old stoma site  12/6 abdominal incision noted to have yellow drainage  CT abdomen showed abdominal abscess and distended gallbladder  Patient underwent percutaneous cholecystostomy tube insertion and hepatectomy abscess drainage  ID consulted, IV abx for 1 week   Nephrology consulted d/t ALEXY, creatinine baseline 1 2-1 4  On 12/14, patient experienced chest pain with movement  Cardiology evaluated; troponin level 57; chest pain appeared musculoskeletal with no further work up  Patient deemed medically stable and admitted to [facility] on 12/14/2022  SUBJECTIVE: Patient seen face to face  Slept well once pain was controlled  Patient in good spirits, less tearful  "I know I'm going to beat this  I am no longer a victim" Reports pain 10/10; abdominal and back  Increases with movement, does not radiate  Subsides with rest and medication  LBM 12/21  NO CP, SOB, fever, chills, N/V/D, abdominal pain  ASSESSMENT: Stable, progressing    PLAN:  1  Pain- Added breakthrough oxycodone IR 2 5 mg every three hours as needed  2  WBC 13 62 (previous 12 82), afebrile, currently on IV Ertapenem with potential conversion to PO doxycycline per ID; Hbg 8 7 (previous 8 7), continue to monitor  3  AST continues to trend down, ALT stable, bilirubin 1 64 (previous 1 33) with conservative treatment-may need MRCP in future, alkaline phosphatase 1093 (previous 1025) GI recommends alkaline phosphatase isoenzyme EOD next due 12/22  4  Perisplenic VICTORINO drain has gray purulent drainage, left abdominal VICTORINO drain with purulent drainage  Cholecystostomy tube with sanguinous, bilious drainage (6 week tube check), midline VICTORINO drain- purulent drainage  Continue to monitor drainage  Contact IR with concerns  Rehabilitation  Team conference: The team discussed patient's functional status, goals, and barriers  • Functional deficits:    • Continue current rehabilitation plan of care to maximize function  • Functional update:   Physical Therapy Occupational Therapy Speech Therapy   Weight Bearing Status: Full Weight Bearing  Transfers:  Moderate Assistance  Bed Mobility: Moderate Assistance  Amulation Distance (ft): 10 feet  Ambulation: Total Assistance ([address] x1 and CFA)  Discharge Recommendations: Home with:  [address] with[de-identified] First Floor Setup, Family Support, 24 Hour Assisteance, Home Physical Therapy   Eating: Supervision  Grooming: Supervision  Bathing: Moderate Assistance  Bathing: Moderate Assistance  Upper Body Dressing: Moderate Assistance  Lower Body Dressing: Assist of 2  Toileting: Assist of 2  Tub/Shower Transfer:  (N/A at this time, multiple drains/lines)  Toilet Transfer: Moderate Assistance  Cognition: Within Defined Limits  Orientation: Person, Place, Time, Situation               • Estimated Discharge: ADD 3 weeks    Pain  • Tylenol, oxycodone    DVT prophylaxis  • Heparin sc    Bladder plan  • Continent, urinal    Bowel plan  • ileostomy      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)  Assessment & Plan  · MRI-multiple hepatic metastasis; smaller lesions in left lobe, larger lesions in right lobe  · 11/7 Exp Lap, resection of hepatic segment 3, resection of transverse colon with reversal of loop colostomy (Dr Netta Mckenzie)  · 11/16- right hemicolectomy, left discontinuity and temporary abdominal closure device placed  · 11/17- re-exploration, partial descending colectomy, primary colocolonic anastomosis with diverting loop ileostomy, midline VAC placement  · Abdominal incision- yellow drainage; CT showed abdominal abscess and distended gallbladder  · 12/8- IR percutaneous cholecystostomy tube, hepatectomy abscess drain placement  · Monitor incisions, dressings  · Monitor CBC, CMP  · IM consulted for assistance with management  · Follow-up outpatient with hem/onc  · For IR on 12/20 to have additional drains placed for a perisplenic abscess as well as a splenic recess abscess    Antibiotics have been reinitiated earlier this week    * Malignant neoplasm of transverse colon [facility])  Assessment & Plan  · Transverse colonic apple core lesion and innumerable hepatic metastases  · Colostomy 2/22  · RCW port-a-cath, accessed  · S/p palliative chemo  · Follows with Dr Paulette Arreaga (palliative)  · Follows hem/onc (Dr Fletcher Serum)    Sepsis [facility])  Assessment & Plan  · SIRS versus sepsis at this time given his vitals, leukocytosis, and complicated infection history  · Had finished his course of Zosyn on 1212 and ertapenem by 1215  · Consultation to infectious disease, appreciate their recommendations  · Drawing blood cultures x2 restarting his ertapenem and obtaining a CT abdomen and pelvis at this time  · Currently improved on ertapenem, which was restarted due to increasing leukocytosis  · 12/20-IR perisplenic, perigastric drain placement for 2 additional abscesses  · Follow-up with blood cultures    Acute kidney injury ([facility] [address] )  Assessment & Plan  · Creatinine baseline 1 2-1 4  · Current creatinine 1 28  · Consider consult nephrology  · Monitor BMP    Bacteremia  Assessment & Plan  · Abdominal abscess- ESBL E Coli  · Zosyn/Ertapenam 7 days (Ertapenem completed 12/15, Zosyn 12/12)  · Contact precautions  · VICTORINO drain- milky drainage  · Due to elevated leukocytosis over this past weekend reconsulted infectious disease and continues on ertapenem    Iron deficiency anemia, unspecified  Assessment & Plan  · Hbg 8 7 (previously 9 3, 8 0,7 3)  · 12/15-1 unit PRBc  · 12/16- symptomatic- 1 unit PRBc  · Monitor CBC    Obesity (BMI 30-39  9)  Assessment & Plan  · BMI 33 0  · Diet and lifestyle modifications  · Nutrition consult    Benign essential hypertension  Assessment & Plan  · Home: Lisinopril  · Here: Norvasc 5 mg BID  · Adjust medication as needed  · IM consult for assistance with management  · Monitored outpatient by PCP    Type 2 diabetes mellitus without complication, with long-term current use of insulin ([facility])  Assessment & Plan  · HbgA1c 8 0 (9/22)  · Home monitoring with Nathan Fierro  · Home: Lantus 12 units, Humalog 3 units, Januvia  · Here: Humalog 3 units with meals, SSI, Lantus   · Follow with PCP (Dr Darcy Desir)      [address] [facility] medical co-management  Labs, medications, and imaging reviewed       ROS:  Review of Systems   A 10 point review of systems was negative except for what is noted in the HPI      OBJECTIVE:   /80   Pulse (!) 112   Temp 98 1 °F (36 7 °C) (Oral)   Resp 18   Ht 5' 10" (1 778 m)   Wt 104 kg (230 lb)   SpO2 94%   BMI 33 00 kg/m²     Physical Exam     Lab Results   Component Value Date    WBC 13 62 (H) 12/21/2022    HGB 8 7 (L) 12/21/2022    HCT 28 0 (L) 12/21/2022    MCV 86 12/21/2022     12/21/2022     Lab Results   Component Value Date    SODIUM 138 12/21/2022    K 4 0 12/21/2022     12/21/2022    CO2 23 12/21/2022    BUN 18 12/21/2022    CREATININE 1 28 12/21/2022    GLUC 226 (H) 12/21/2022    CALCIUM 8 7 12/21/2022     Lab Results   Component Value Date    INR 1 12 12/19/2022    INR 1 10 11/12/2022    INR 1 24 (H) 11/09/2022    PROTIME 14 7 (H) 12/19/2022    PROTIME 14 4 11/12/2022    PROTIME 15 8 (H) 11/09/2022       Current Facility-Administered Medications:   •  acetaminophen (TYLENOL) tablet 975 mg, 975 mg, Oral, Q8H [address], GAURANG Keith, 975 mg at 12/21/22 0607  •  amLODIPine (NORVASC) tablet 5 mg, 5 mg, Oral, BID, GAURANG Keith, 5 mg at 12/21/22 3514  •  aspirin chewable tablet 81 mg, 81 mg, Oral, Daily, GAURANG Keith, 81 mg at 12/21/22 9869  •  atorvastatin (LIPITOR) tablet 40 mg, 40 mg, Oral, Daily, GAURANG Keith, 40 mg at 12/21/22 2008  •  calcium carbonate (TUMS) chewable tablet 500 mg, 500 mg, Oral, BID PRN, GAURANG Keith, 500 mg at 12/19/22 1715  •  cholecalciferol (VITAMIN D3) tablet 400 Units, 400 Units, Oral, Daily, GAURANG Keith, 400 Units at 12/21/22 0834  •  ertapenem (INVanz) 1,000 mg in sodium chloride 0 9 % 50 mL IVPB, 1,000 mg, Intravenous, Q24H, Oni Garland MD, Stopped at 12/20/22 2221  •  heparin (porcine) subcutaneous injection 5,000 Units, 5,000 Units, Subcutaneous, Q8H [address], Jenelle Martin, GAURANG, 5,000 Units at 12/21/22 0608  •  insulin glargine (LANTUS) subcutaneous injection 12 Units 0 12 mL, 12 Units, Subcutaneous, HS, Chalino Cr, GAURANG  •  insulin lispro (HumaLOG) 100 units/mL subcutaneous injection 1-5 Units, 1-5 Units, Subcutaneous, HS, GAURANG Keith, 1 Units at 12/18/22 2135  •  insulin lispro (HumaLOG) 100 units/mL subcutaneous injection 1-6 Units, 1-6 Units, Subcutaneous, TID AC, 2 Units at 12/21/22 0831 **AND** Fingerstick Glucose (POCT), , , TID AC, GAURANG Keith  •  insulin lispro (HumaLOG) 100 units/mL subcutaneous injection 6 Units, 6 Units, Subcutaneous, TID With Meals, GAURANG Ritter  •  iohexol (OMNIPAQUE) 240 MG/ML solution 50 mL, 50 mL, Oral, 90 min pre-procedure, Hilary Molina MD  •  melatonin tablet 3 mg, 3 mg, Oral, HS, GAURANG Keith, 3 mg at 12/20/22 2217  •  methocarbamol (ROBAXIN) tablet 500 mg, 500 mg, Oral, BID, GAURANG Keith, 500 mg at 12/21/22 3371  •  multivitamin-minerals (CENTRUM) tablet 1 tablet, 1 tablet, Oral, Daily, GAURANG Keith, 1 tablet at 12/21/22 6321  •  ondansetron (ZOFRAN-ODT) dispersible tablet 4 mg, 4 mg, Oral, Q6H PRN, GAURANG Keith, 4 mg at 12/17/22 0851  •  oxyCODONE (ROXICODONE) immediate release tablet 10 mg, 10 mg, Oral, Q6H PRN, GAURANG Keith, 10 mg at 12/21/22 0607  •  oxyCODONE (ROXICODONE) IR tablet 2 5 mg, 2 5 mg, Oral, Q3H PRN, GAURANG Keith  •  oxyCODONE (ROXICODONE) IR tablet 5 mg, 5 mg, Oral, Q6H PRN, GAURANG Keith  •  pantoprazole (PROTONIX) EC tablet 40 mg, 40 mg, Oral, BID AC, GAURANG Keith, 40 mg at 12/21/22 0607  •  simethicone (MYLICON) chewable tablet 80 mg, 80 mg, Oral, 4x Daily (with meals and at bedtime), GAURANG Keith, 80 mg at 12/21/22 0967  •  tamsulosin (FLOMAX) capsule 0 4 mg, 0 4 mg, Oral, Daily With Dinner, GAURANG Robles, 0 4 mg at 12/19/22 1641    Past Medical History:   Diagnosis Date   • Abdominal pain 03/12/2022   • Acute renal failure [facility])     [id number] resolved   • Cancer ([facility] )    • Diabetes mellitus ([place] [address] )    • Enteritis 08/23/2016   • Gastroparesis due to DM ([facility] ) 08/23/2016 • GERD (gastroesophageal reflux disease)    • Hernia, ventral 08/04/2016   • Hyperlipidemia    • Hypertension    • Morbid obesity ([place] [address] ) 04/17/2018   • Postoperative visit 03/02/2022   • SIRS (systemic inflammatory response syndrome) ([place] [address] ) 03/12/2022   • Snoring    • Stage 3a chronic kidney disease ([place] [address] ) 02/19/2022       Patient Active Problem List    Diagnosis Date Noted   • 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 liver ([facility] ) 03/02/2022   • Malignant neoplasm of transverse colon ([place] [address] ) 03/01/2022   • Sepsis ([place] [address] ) 12/17/2022   • Severe protein-calorie malnutrition ([place] [address] ) 12/14/2022   • Acute kidney injury ([place] [address] ) 12/14/2022   • Flash pulmonary edema ([place] [address] ) 11/12/2022   • MR (mitral regurgitation) 11/12/2022   • Bacteremia 11/12/2022   • ESBL (extended spectrum beta-lactamase) producing bacteria infection 11/12/2022   • Acute respiratory failure with hypoxia ([place] [address] ) 11/12/2022   • Encephalopathy 11/09/2022   • Cervical radiculopathy 10/26/2022   • Other fatigue 06/15/2022   • Colostomy prolapse ([place] [address] ) 05/27/2022   • Colon cancer metastasized to liver ([place] [address] ) 03/12/2022   • Iron deficiency anemia, unspecified 03/01/2022   • Thrombocytosis 02/21/2022   • Hypokalemia 02/19/2022   • Transaminitis 02/19/2022   • Type 2 diabetes mellitus with hyperlipidemia ([place] [address] ) 02/18/2022   • Colonic mass 02/18/2022   • Microcytic anemia 02/18/2022   • Left ureteral calculus 01/30/2020   • Incarcerated umbilical hernia 27/02/8997   • Testicular hypogonadism 06/19/2017   • Low testosterone 05/30/2017   • Type 2 diabetes mellitus without complication, with long-term current use of insulin ([place] [address] ) 08/23/2016   • Benign essential hypertension 08/23/2016   • Mixed hyperlipidemia 08/23/2016   • Erectile dysfunction 07/11/2016   • Obesity (BMI 30-39 9) 07/11/2016      GAURANG Canas  Physical Medicine and Chris Luu  Total time spent:  35 minutes, with more than 50% spent counseling/coordinating care  Counseling includes discussion with patient re: progress in therapies, functional issues observed by therapy staff, and discussion with patient his/her current medical state/wellbeing  Coordination of patient's care was performed in conjunction with Internal Medicine service to monitor patient's labs, vitals, and management of their comorbidities  In addition, this patient was discussed by the interdisciplinary team in weekly case conference today  The care of the patient was extensively discussed with all care providers and an appropriate rehabilitation plan was formulated unique for this patient  Barriers were identified preventing progression of therapy and appropriate interventions were discussed with each discipline  Please see the team note for input from all disciplines regarding barriers, intervention, and discharge planning

## 2022-12-21 NOTE — PCC CARE MANAGEMENT
Pt was expected to return home Tuesday but due to abnormal labs pts dc was held  c services are in place through traditional home health and dme has been delivered  Awaiting medical clearance for pt to return home

## 2022-12-21 NOTE — TEAM CONFERENCE
Acute [facility] Conference Note  Date: 12/21/2022   Time: 11:03 AM       Patient Name:  Daisy Mcgee       Medical Record Number: [id number]   YOB: 1965  Sex: Male          Room/Bed:  [facility]4/[facility] 964-01  Payor Info:  Payor: [employer] Will / Plan: [employer] Will / Product Type: HMO Commercial /      Admitting Diagnosis: Critical illness myopathy [G72 81]   Admit Date/Time:  12/14/2022  3:18 PM  Admission Comments: No comment available     Primary Diagnosis:  Malignant neoplasm of transverse colon ([facility] 75 )  Principal Problem: Malignant neoplasm of transverse colon [facility])    Patient Active Problem List    Diagnosis Date Noted   • Sepsis ([facility] ) 12/17/2022   • Severe protein-calorie malnutrition ([facility] ) 12/14/2022   • Acute kidney injury ([place] [address] ) 12/14/2022   • Flash pulmonary edema ([place] [address] ) 11/12/2022   • MR (mitral regurgitation) 11/12/2022   • Bacteremia 11/12/2022   • ESBL (extended spectrum beta-lactamase) producing bacteria infection 11/12/2022   • Acute respiratory failure with hypoxia ([place] [address] ) 11/12/2022   • Encephalopathy 11/09/2022   • Cervical radiculopathy 10/26/2022   • Other fatigue 06/15/2022   • Colostomy prolapse ([place] [address] ) 05/27/2022   • Colon cancer metastasized to liver ([place] [address] ) 03/12/2022   • 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 liver ([place] [address] ) 03/02/2022   • Iron deficiency anemia, unspecified 03/01/2022   • Malignant neoplasm of transverse colon ([facility] ) 03/01/2022   • Thrombocytosis 02/21/2022   • Hypokalemia 02/19/2022   • Transaminitis 02/19/2022   • Type 2 diabetes mellitus with hyperlipidemia ([facility] ) 02/18/2022   • Colonic mass 02/18/2022   • Microcytic anemia 02/18/2022   • Left ureteral calculus 01/30/2020   • Incarcerated umbilical hernia 22/33/5896   • Testicular hypogonadism 06/19/2017   • Low testosterone 05/30/2017   • Type 2 diabetes mellitus without complication, with long-term current use of insulin ([facility] ) 08/23/2016   • Benign essential hypertension 08/23/2016   • Mixed hyperlipidemia 08/23/2016   • Erectile dysfunction 07/11/2016   • Obesity (BMI 30-39 9) 07/11/2016       Physical Therapy:    Weight Bearing Status: Full Weight Bearing  Transfers: Moderate Assistance  Bed Mobility: Moderate Assistance  Amulation Distance (ft): 10 feet  Ambulation: Total Assistance ([address] x1 and CFA)  Discharge Recommendations: Home with:  [address] with[de-identified] First Floor Setup, Family Support, 24 Hour Assisteance, Home Physical Therapy    Pt cont with medical barriers, such as needing procedures for drain placements on 12/20  He has significant deconditioning and dec activity tolerance, which limits his overall ability to fully participate in rehab program  He feels like he is making progress with transfers and mobility, however it is slow progress  He still needs physical A for all mobility secondary to dec strength, dec activity tolerance, dec balance/righting reactions  Currently will need to use WC level for mobility at home pending progress  To cont to work with medical team to address medical status  Barriers include need for physical A for all mobility, ADRIAN,  need for care/assistance with ostomy and drains  He hasn't been able to practice stairs yet, which is another barrier at this time  Occupational Therapy:  Eating: Supervision  Grooming: Supervision  Bathing: Moderate Assistance  Bathing: Moderate Assistance  Upper Body Dressing: Moderate Assistance  Lower Body Dressing: Assist of 2  Toileting: Assist of 2  Tub/Shower Transfer:  (N/A at this time, multiple drains/lines)  Toilet Transfer: Moderate Assistance  Cognition: Within Defined Limits  Orientation: Person, Place, Time, Situation  Discharge Recommendations: Home with:  [address] with[de-identified] Family Support       Pt making slow progress towards OT goals  Pt continues to be limited by dec endurance, dec act julia, dec strength and medical complexity  Pt still requiring up to A x 2 pending levels of fatigue/pain  Can function at modA x 1 pending same   Anticipate  3week(s) length of stay to achieve mostly supervision level goals  Speech Therapy:           No notes on file    Nursing Notes:  Appetite: Fair  Diet Type: Diabetic                           Type of Wound (LDA): Wound                                         Pain Location/Orientation: Location: Abdomen  Pain Score: 10                       Hospital Pain Intervention(s): Medication (See MAR), Repositioned, Emotional support, Rest          No notes on file    Case Management:     Discharge Planning  Living Arrangements: Lives w/ Spouse/significant other, Lives w/ Children  Support Systems: Spouse/significant other, Son, Daughter  Assistance Needed: tbd  Type of Current Residence: Private residence  Current Home Care Services: No  Pt admitted s/p metastatic cancer and has pain and weakness  Pt is participating intermittently and is undergoing medical treatment to assist in medical stability  Clinical update conducted yesterday, determination pending  Pts dc plan is unclear at this time due to the limited progress made in overall functional gain  Following to assist w/dc planning needs         Is the patient actively participating in therapies? no  List any modifications to the treatment plan:     Barriers Interventions   Elevated wbc Iv abx with length of time tbd   pain Pain meds adjusted monitored daily   Endurance, acitivity tolerance Energy conservation education, repositioning   Overall deconditioning, strength 900 minute program   stairs Therapy stair training     Is the patient making expected progress toward goals? no  List any update or changes to goals:     Medical Goals: Patient will be medically stable for discharge to [facility] upon completion of rehab program and Patient will be able to manage medical conditions and comorbid conditions with medications and follow up upon completion of rehab program    Weekly Team Goals:   Rehab Team Goals  ADL Team Goal: Patient will require assist with ADLs with least restrictive device upon completion of rehab program  Bowel/Bladder Team Goal: Patient will require assist with bladder/bowel management with least restrictive device upon completion of rehab program  Transfer Team Goal: Patient will be independent with transfers with least restrictive device upon completion of rehab program  Locomotion Team Goal: Patient will be independent with locomotion with least restrictive device upon completion of rehab program    Discussion: pt presents with the above barriers and is making minimal gains  Pt is on a 900 minute program with limited tolerance due to pain and fatigue  Pt has limited oob and sitting tolerance  Max a adls, max bed mobility, mod for transfer occastionally needed a 2nd person for safety, walking 10 ft mod a with walker  Pt has made gains but has had medical set backs with new drains being placed yesterday  Clinical update conducted yesterday, determination pending  Anticipated Discharge Date:  reteam [facility] Team Members Present: The following team members are supervising care for this patient and were present during this Weekly Team Conference      Physician: Dr Sayda Lowe DO  : PALLAVI Pringle  Registered Nurse: Cordell Claudio RN  Physical Therapist: Sakina Evans DPT  Occupational Therapist: Mary Brandon MS, OTR/L  Speech Therapist:

## 2022-12-21 NOTE — SEDATION DOCUMENTATION
Left abdominal / perigastric abscess drain and left flank/back perisplenic drain placed by Dr Abdoulaye Hoffman  Cultures sent as ordered  Both drains placed to Bulb suction  Discharged back to [address] [place] [address] report to RN

## 2022-12-21 NOTE — PROGRESS NOTES
Progress Note - Infectious Disease   Daisy Mcgee 61 y o  male MRN: [id number]  Unit/Bed#: -01 Encounter: [id number]      Impression/Recommendations:  Sepsis     Evolving 12/17:  WBC, HR   Suspect due to persistent left intra-abdominal infection in setting of complex history below, recently completed antibiotics   ROS and exam otherwise negative   Recent Flu/RSV/COVID PCR, CXR negative   Blood cultures negative   Subjectively improved with reinitiation of antibiotics  Rec:  • Continue ertapenem for now  • Follow temperatures closely  • Recheck CBC in AM  • Drains per IR  • Supportive care as per the primary service     Intra-abdominal abscess     In the setting complex surgical history as below   Initially developed 11/2022 companied by ESBL E  coli bacteremia   Status post exploratory laparotomy, right hemicolectomy, temporary abdominal closure 11/16; re-exploration, partial left colectomy, primary ileocolonic anastomosis with proximal diverting loop ileostomy, midline fascial closure on 11/17   More recently developed abscess in hepatectomy bed and collection +/- leak at area of prior colonic anastamosis, 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   Status post IR drain into perihepatic collection 12/8   Cultures with ESBL E  Coli   Status post 7 days ertapenem after drain placement through 12/15   Repeat CT 12/17 shows hepatic bed collection improved, persistent left intra-abdominal collection  Status post perigastic, perisplenic drains 12/20    Rec:  • Continue antibiotics as above  • Drains per IR  • May need more prolonged course of antibiotics - PO doxycycline an eventual option     Possible acute cholecystitis     Status post percutaneous cholecystostomy tube   Alk phos remains markedly elevated, possibly due to drain itself versus known intrahepatic metastases      Metastatic colon cancer   To liver, possible lung   Status post resection, chemotherapy, more recent hepatic resection and ablation, transverse colon resection      CKD   Baseline Cr 1 4     DM      Anemia   Status post multiple transfusions      The above impression and plan was discussed in detail with the patient and Dr Raissa Shin      Antibiotics:  Ertapenem #5    Subjective:  Patient seen on AM rounds  Tired  Lots of pain from drains  24 Hour Events:  Had 2 additional IR drains placed yesterday evening  No documented fevers, chills, sweats, nausea, vomiting, or diarrhea  Objective:  Vitals:  Temp:  [98 1 °F (36 7 °C)-98 7 °F (37 1 °C)] 98 1 °F (36 7 °C)  HR:  [] 108  Resp:  [15-19] 18  BP: (125-196)/(75-94) 137/80  SpO2:  [94 %-100 %] 94 %  Temp (24hrs), Av 4 °F (36 9 °C), Min:98 1 °F (36 7 °C), Max:98 7 °F (37 1 °C)  Current: Temperature: 98 1 °F (36 7 °C)    Physical Exam:   General:  No acute distress  Psychiatric:  Awake and alert  Pulmonary:  Normal respiratory excursion without accessory muscle use  Abdomen:  Soft, nontender  Extremities:  No edema  Skin:  No rashes    Lab Results:  I have personally reviewed pertinent labs  Results from last 7 days   Lab Units 22  0748 22  0816 22  0536 22  1300   POTASSIUM mmol/L 4 0  --  3 5 3 7   CHLORIDE mmol/L 108  --  105 104   CO2 mmol/L 23  --  24 25   BUN mg/dL 18  --  16 14   CREATININE mg/dL 1 28  --  1 19 1 37*   EGFR ml/min/1 73sq m 61  --  66 56   CALCIUM mg/dL 8 7  --  8 4 8 4   AST U/L 57* 58* 61* 86*   ALT U/L 17 17 19 19   ALK PHOS U/L 1,093* 1,025* 989* 1,185*     Results from last 7 days   Lab Units 22  0724 22  0536 22  0632   WBC Thousand/uL 13 62* 12 82* 18 24*   HEMOGLOBIN g/dL 8 7* 8 7* 9 3*   PLATELETS Thousands/uL 376 332 294     Results from last 7 days   Lab Units 22  19422  1906 22  1312 22  1234   BLOOD CULTURE   --   --  No Growth at 72 hrs  No Growth at 72 hrs     GRAM STAIN RESULT  2+ Polys  No bacteria seen 1+ Gram negative rods*  1+ Gram positive rods*  2+ Polys*  -- --        Imaging Studies:   I have personally reviewed pertinent imaging study reports and images in PACS  EKG, Pathology, and Other Studies:   I have personally reviewed pertinent reports

## 2022-12-21 NOTE — PROGRESS NOTES
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  • s/p hepatic resection and reversal of colostomy on 11/7  • Return to the OR 11/16 for right hemicolectomy with partial descending colectomy colocolonic anastomosis with loop ileostomy and mid line abdominal VAC placement  • Continue local wound care; WC following  • 12/19:  Ileostomy = 1650ml, radha tube = 30ml, VICTORINO drain = 25ml; U/O = 1350     • D/w Rozena Alfredoe from [facility] = if ileostomy is >2L for 2 days in a row then to give IVF  • CT  Abd/pelvis 12/17 = loculated collection along splenic recess  Has perisplenic hilum collection as well --> to IR 12/20 for drain placement in both perigastric and perisplenic area and cx sent from both areas and pending  • Flush drains with 10ml qd; tube check 2 weeks 1/3/23     Acute cholecystitis  • s/p percutaneous tube placement 12/8 with tube check on 12/12  • Currently draining bloody discharge  • IR evaluated 12/14 states perc radha tube drainage may remain bloody for several days  • No further abdominal pain  • GI following due to LFT elevation = felt alkaline phosphatase elevated 2/2 infiltrative disease rather than focal biliary obstruction  AMA was negative  • To follow-up alkaline phosphatase isoenzyme  They recommended continuing to monitor his LFTs  • On 12/20, AST/TB/DB/AP about the same  • Today, TB up to 1 64 from 1 33 so notified GI = if continues to uptrend then he may need MRCP  They rec'd check LFTs QOD unless has fevers or worsening abd pain (will start QOD 12/22)     Bacteremia/abdominal abscess  • ID now following due to worsening leukocytosis and restarted Ertapenem    • Follow-up blood cultures from 12/17 = NG at 72hrs    • CT abdomen and pelvis as above  • VICTORINO drain currently with thick milky drainage     Hypertension  • On Norvasc 5mg BID  • stable     Diabetes mellitus  • On Lantus 12U qhs/Lispro 6U TID  • Gave Lispro 6U 12/19 since NPO after midnight and then again last night since so late returning from IR and eating  • Skipped Lispro this AM since wasn't sure how much he was going to eat last lora and what BS would be this AM  • He ate breakfast well but not lunch since bloated today  • Continue diabetic diet and QID Accuchecks with SSI     Acute on chronic renal failure  • Stage III; baseline 1 4  • Lisinopril currently on hold  • is 1 28 today 12/21  • BMP AM since getting LFTs     Anemia  • Multifactorial due to recent infection, surgery, CKD stage III  • Transfused on 12/15 for hemoglobin 7 3 and 12/16/22 for hgb of 8   • Hgb currently stable at 8 7 again     Atypical chest pain  • With elevation in troponin/EKG changes  • Cardiology cleared pt for discharge  • Did not recommend any further work up  • On 12/15 had CP = Cards saw and felt was M/S     Situational depression  • Neuropsychology consulted  • Patient not interested in medications at this point in time     Malnutrition  • Recommend dietician consultation to optimize wound healing  • Glucerna makes him nauseated  • Breads etc make him too full  • On 12/15, ordered double protein  • NPO today for IR     Pleural effusion  • CXR on 12/14 showed small left pleural effusion and was suspect for CHF  • ECHO 11/9/22 = LVEF 55%, unable to assess diastolic function, mild TR  • No SOB and sats are stable on RA  • Will watch for now        Discharge date:  Team       The above assessment and plan was reviewed and updated as determined by my evaluation of the patient on 12/21/2022      Labs:   Results from last 7 days   Lab Units 12/21/22  0724 12/19/22  0536   WBC Thousand/uL 13 62* 12 82*   HEMOGLOBIN g/dL 8 7* 8 7*   HEMATOCRIT % 28 0* 27 2*   PLATELETS Thousands/uL 376 332     Results from last 7 days   Lab Units 12/21/22  0748 12/19/22  0536   SODIUM mmol/L 138 136   POTASSIUM mmol/L 4 0 3 5   CHLORIDE mmol/L 108 105   CO2 mmol/L 23 24   BUN mg/dL 18 16   CREATININE mg/dL 1 28 1 19   CALCIUM mg/dL 8 7 8 4         Results from last 7 days   Lab Units 12/19/22  0841   INR  1 12     Results from last 7 days   Lab Units 12/21/22  0831 12/21/22  0700 12/20/22 2021   POC GLUCOSE mg/dl 206* 195* 148*       Review of Scheduled Meds:  Current Facility-Administered Medications   Medication Dose Route Frequency Provider Last Rate   • acetaminophen  975 mg Oral Q8H [address] GAURANG Keiht     • amLODIPine  5 mg Oral BID GAURANG Keith     • aspirin  81 mg Oral Daily GAURANG Keith     • atorvastatin  40 mg Oral Daily GAURANG Keith     • calcium carbonate  500 mg Oral BID PRN GAURANG George     • cholecalciferol  400 Units Oral Daily GAURANG Keith     • ertapenem  1,000 mg Intravenous Q24H Ibrahima Rudd MD Stopped (12/20/22 2221)   • heparin (porcine)  5,000 Units Subcutaneous Q8H [address] GAURANG Keith     • insulin glargine  12 Units Subcutaneous HS Gerrianne Buerger Harleman, CRNP     • insulin lispro  1-5 Units Subcutaneous HS GAURANG Keith     • insulin lispro  1-6 Units Subcutaneous TID AC GAURANG Keith     • insulin lispro  6 Units Subcutaneous TID With Meals Gerrianne Buerger Harleman, CRNP     • iohexol  50 mL Oral 90 min pre-procedure Ibrahima Rudd MD     • melatonin  3 mg Oral HS GAURANG Keith     • methocarbamol  500 mg Oral BID GAURANG George     • multivitamin-minerals  1 tablet Oral Daily GAURANG Keith     • ondansetron  4 mg Oral Q6H PRN GAURANG George     • oxyCODONE  10 mg Oral Q6H PRN GAURANG Keith     • oxyCODONE  2 5 mg Oral Q3H PRN GAURANG George     • oxyCODONE  5 mg Oral Q6H PRN GAURANG Keith     • pantoprazole  40 mg Oral BID AC GAURANG Keith     • simethicone  80 mg Oral 4x Daily (with meals and at bedtime) GAURANG George     • tamsulosin  0 4 mg Oral Daily With Dinner GAURANG Borges         Subjective/ HPI: Patient seen and examined  Patients overnight issues or events were reviewed with nursing or staff during rounds or morning huddle session  New or overnight issues include the following:     Had 2 drains placed last evening as planned  ROS:   A 10 point ROS was performed; negative except as noted above  Imaging:     IR drainage tube placement   Final Result by Valeria Ren MD (12/20 2003)      Percutaneous placement of abscess drainage catheters into the perigastric and perisplenic abscesses using 10-French catheters  Plan:       - Catheters to bulb suction drainage    - Flush catheters with 10 mL normal saline daily    - Return in 2 weeks for drain check  Workstation performed: [id number]         CT abdomen pelvis w contrast   Final Result by Zheng Lan MD ([id number])      Status post cholecystostomy tube placement with decompression of the gallbladder  In addition, a catheter placed adjacent to the cut edge of the liver is increased position with near complete resolution of fluid collection  Loculated collection along the splenic recess of the lesser sac is unchanged in size  Additional perisplenic collection in the hilum and along the inferior pole are unchanged  Collection of fluid and gas in the left upper quadrant adjacent to left kidney also not significantly changed    No extravasated oral contrast             Workstation performed: [id number]         IR drainage tube check/change/reposition/reinsertion/upsize    (Results Pending)       *Labs /Radiology studies reviewed  *Medications reviewed and reconciled as needed  *Please refer to order section for additional ordered labs studies  *Case discussed with primary attending during morning huddle case rounds    Physical Examination:  Vitals:   Vitals:    12/20/22 1946 12/20/22 2018 12/21/22 0615 12/21/22 0836   BP: 155/82 125/78 164/93 137/80   BP Location:  Right arm Left arm    Pulse: 100 95 (!) 112    Resp: 16 19 18    Temp:  98 7 °F (37 1 °C) 98 1 °F (36 7 °C)    TempSrc:  Oral Oral    SpO2: 99% 97% 94%    Weight:       Height:           General Appearance: no distress, conversive  HEENT: PERRLA, conjuctiva normal; oropharynx clear; mucous membranes moist   Neck:  Supple, normal ROM  Lungs: CTA, normal respiratory effort, no retractions, expiratory effort normal  CV: regular rate and rhythm; no rubs/murmurs/gallops, PMI normal   ABD: soft; ND/NT; +BS  EXT: no edema  Skin: normal turgor, normal texture, no rashes  Psych: affect normal, mood normal  Neuro: AAO      The above physical exam was reviewed and updated as determined by my evaluation of the patient on 12/21/2022  Invasive Devices     Central Venous Catheter Line  Duration           Port A Cath 03/10/22 Right Chest 286 days          Drain  Duration           Ileostomy LUQ 33 days    Abscess Drain Abdomen 12 days    Cholecystostomy Tube 8 days    Abscess Drain Abdomen <1 day    Abscess Drain Back <1 day                   VTE Pharmacologic Prophylaxis: Heparin  Code Status: Level 1 - Full Code  Current Length of Stay: 7 day(s)      Total time spent:  30 minutes with more than 50% spent counseling/coordinating care  Counseling includes discussion with patient re: progress  and discussion with patient of his/her current medical state/information  Coordination of patient's care was performed in conjunction with primary service  Time invested included review of patient's labs, vitals, and management of their comorbidities with continued monitoring  In addition, this patient was discussed with medical team including physician and advanced extenders  The care of the patient was extensively discussed and appropriate treatment plan was formulated unique for this patient   Medical decision making for the day was made by supervising physician unless otherwise noted in their attestation statement  ** Please Note:  voice to text software may have been used in the creation of this document   Although proof errors in transcription or interpretation are a potential of such software**

## 2022-12-21 NOTE — NURSING NOTE
Patient returned from IR procedure and given a diabetic diet after being NPO for the procedure  Dr Cindy Crook from [address] [place] [address] notified regarding discontinuing fluids since the patient is now eating  Order noted to discontinue fluids

## 2022-12-21 NOTE — PROGRESS NOTES
12/21/22 1240   Pain Assessment   Pain Score 7   Pain Location/Orientation Location: Back  (L flank by new drains)   Effect of Pain on Daily Activities worked on breathign to help in moments of pain; pt was less during session  after pain meds kicked in , per pt report   Hospital Pain Intervention(s) Repositioned;Relaxation technique  (RN had proivded pain meds prior to start of session)   Restrictions/Precautions   Precautions Fall Risk;Supervision on toilet/commode;Contact/isolation   Cognition   Arousal/Participation Cooperative   Subjective   Subjective pt rpeorted feeling very tired today, he didn't sleep well last night, and his eating schedule is off today after being NPO yesterday and he feels bloated with no appetite for lunch  He declined getting OOB to recliner, but opted for bed level for this hour  also talked about d/c planning  Therapeutic Interventions   Flexibility bilat hamstring stretch 2x 60 sec  bilat calf stretch 3x60 sec  AAROM for KTC as needed, 2 sets of 8 each leg (first time with more hip abd)  long sitting hip IR/ER AROM  AROm for ankle pumps, inversion/eversion and ankle circles clockwise/counter clockwise  quad sets, mini SAQ over pillow and bent knee position in bed  Hooklying hip abd into manual resistance 2x6-8 each leg  Rest breaks between  worked on breathing techniques; pt not able to take deep breath at this time duet o pain  incentive spirometer x 10 during session  boosted up in bed at end of session so he was in better position in bed  Other Comments   Comments reviewed will need to perform FT for transfers, walking, mobility, etc  Reviewed RN will also need to perform training for drains, etc  Pt reported he feels okay to change the ostomy bag as hew as doing this prior  Assessment   Treatment Assessment Pt cont to be very fatigued today, partly due to being NPO the majority of yesterday adn having soreness/pain on L flank after new drains put in eysterday   Pt and wife were receptive tot alk about d/c home at [facility] level if the walking with RW doesn't progress as we initially planned  Reivewed that we don't have a dc date yet, but recomending ramp to enter (they are planning on moving 1 car and having a temporary ramp in the garage) andt hen first floor setup ; potentially hosptial bed or couch for sleeping on the first floor  there is a sunken living room, which we discussed we can practice traversing 1 curb step wtih RW via SPT method to work on that one step  Pt cont to have limitations in overall activity tolerance, overalls trength secondary to deconditioning  Encouraged pt to get OOB for 2nd session,e jeff if it is just to recliner chair, as he opted to not get OOB this session  Barriers to Discharge Inaccessible home environment;Decreased caregiver support   PT Barriers   Physical Impairment Decreased strength;Decreased range of motion;Decreased endurance; Impaired balance;Decreased mobility;Pain   Functional Limitation Car transfers; Ramp negotiation;Stair negotiation;Standing;Transfers; Walking; Wheelchair management   Plan   Treatment/Interventions Functional transfer training;LE strengthening/ROM; Therapeutic exercise; Endurance training;Equipment eval/education; Bed mobility;Gait training;Patient/family training   Progress Slow progress, medical status limitations   Recommendation   PT Discharge Recommendation Home with home health rehabilitation   PT Therapy Minutes   PT Time In 1240   PT Time Out 1340   PT Total Time (minutes) 60   PT Mode of treatment - Individual (minutes) 60   PT Mode of treatment - Concurrent (minutes) 0   PT Mode of treatment - Group (minutes) 0   PT Mode of treatment - Co-treat (minutes) 0   PT Mode of Treatment - Total time(minutes) 60 minutes   PT Cumulative Minutes 340   Therapy Time missed   Time missed?  No

## 2022-12-21 NOTE — BRIEF OP NOTE (RAD/CATH)
INTERVENTIONAL RADIOLOGY PROCEDURE NOTE    Date: 12/20/2022    Procedure:   1  Perigastric abscess drain placement  2  Perisplenic abscess drain placement  Procedure Summary     Date:  Room / Location:     Anesthesia Start:  Anesthesia Stop:     Procedure:  Diagnosis:     Scheduled Providers:  Responsible Provider:     Anesthesia Type: Not recorded ASA Status: Not recorded          Preoperative diagnosis:   1  Type 2 diabetes mellitus without complication, with long-term current use of insulin (HCC)    2  Obesity (BMI 30-39 9)    3  Malignant neoplasm of transverse colon ([facility] [address] )    4  Severe protein-calorie malnutrition ([facility] [address] )    5  Bacteremia    6  Alkaline phosphatase elevation    7  Leukocytosis, unspecified type    8  Abdominal wall abscess    9  Intra-abdominal fluid collection         Postoperative diagnosis: Same  Surgeon: Elli Tenorio MD     Assistant: None  No qualified resident was available  Blood loss: 1 mL    Specimens:   1  10 mL purulent fluid aspirate from perigastric abscess sent to lab  2  10 mL purulent fluid aspirate from perisplenic abscess sent to lab  Findings: Successful drain placement into perigastric and perisplenic abscesses  Complications: None immediate      Anesthesia: conscious sedation and local

## 2022-12-21 NOTE — PLAN OF CARE
Problem: Prexisting or High Potential for Compromised Skin Integrity  Goal: Skin integrity is maintained or improved  Description: INTERVENTIONS:  - Identify patients at risk for skin breakdown  - Assess and monitor skin integrity  - Assess and monitor nutrition and hydration status  - Monitor labs   - Assess for incontinence   - Turn and reposition patient  - Assist with mobility/ambulation  - Relieve pressure over bony prominences  - Avoid friction and shearing  - Provide appropriate hygiene as needed including keeping skin clean and dry  - Evaluate need for skin moisturizer/barrier cream  - Collaborate with interdisciplinary team   - Patient/family teaching  - Consider wound care consult   Outcome: Progressing     Problem: Potential for Falls  Goal: Patient will remain free of falls  Description: INTERVENTIONS:  - Educate patient/family on patient safety including physical limitations  - Instruct patient to call for assistance with activity   - Consult OT/PT to assist with strengthening/mobility   - Keep Call bell within reach  - Keep bed low and locked with side rails adjusted as appropriate  - Keep care items and personal belongings within reach  - Initiate and maintain comfort rounds  - Make Fall Risk Sign visible to staff  - Offer Toileting every  Hours, in advance of need  - Initiate/Maintain alarm  - Obtain necessary fall risk management equipment:   - Apply yellow socks and bracelet for high fall risk patients  - Consider moving patient to room near nurses station  Outcome: Progressing     Problem: PAIN - ADULT  Goal: Verbalizes/displays adequate comfort level or baseline comfort level  Description: Interventions:  - Encourage patient to monitor pain and request assistance  - Assess pain using appropriate pain scale  - Administer analgesics based on type and severity of pain and evaluate response  - Implement non-pharmacological measures as appropriate and evaluate response  - Consider cultural and social influences on pain and pain management  - Notify physician/advanced practitioner if interventions unsuccessful or patient reports new pain  Outcome: Progressing     Problem: INFECTION - ADULT  Goal: Absence or prevention of progression during hospitalization  Description: INTERVENTIONS:  - Assess and monitor for signs and symptoms of infection  - Monitor lab/diagnostic results  - Monitor all insertion sites, i e  indwelling lines, tubes, and drains  - Monitor endotracheal if appropriate and nasal secretions for changes in amount and color  - [place] appropriate cooling/warming therapies per order  - Administer medications as ordered  - Instruct and encourage patient and family to use good hand hygiene technique  - Identify and instruct in appropriate isolation precautions for identified infection/condition  Outcome: Progressing     Problem: SAFETY ADULT  Goal: Patient will remain free of falls  Description: INTERVENTIONS:  - Educate patient/family on patient safety including physical limitations  - Instruct patient to call for assistance with activity   - Consult OT/PT to assist with strengthening/mobility   - Keep Call bell within reach  - Keep bed low and locked with side rails adjusted as appropriate  - Keep care items and personal belongings within reach  - Initiate and maintain comfort rounds  - Make Fall Risk Sign visible to staff  - Offer Toileting every  Hours, in advance of need  - Initiate/[place]arm  - Obtain necessary fall risk management equipment:   - Apply yellow socks and bracelet for high fall risk patients  - Consider moving patient to room near nurses station  Outcome: Progressing  Goal: Maintain or return to baseline ADL function  Description: INTERVENTIONS:  -  Assess patient's ability to carry out ADLs; assess patient's baseline for ADL function and identify physical deficits which impact ability to perform ADLs (bathing, care of mouth/teeth, toileting, grooming, dressing, etc )  - Assess/evaluate cause of self-care deficits   - Assess range of motion  - Assess patient's mobility; develop plan if impaired  - Assess patient's need for assistive devices and provide as appropriate  - Encourage maximum independence but intervene and supervise when necessary  - Involve family in performance of ADLs  - Assess for home care needs following discharge   - Consider OT consult to assist with ADL evaluation and planning for discharge  - Provide patient education as appropriate  Outcome: Progressing  Goal: Maintains/Returns to pre admission functional level  Description: INTERVENTIONS:  - Perform BMAT or MOVE assessment daily    - Set and communicate daily mobility goal to care team and patient/family/caregiver  - Collaborate with rehabilitation services on mobility goals if consulted  - Perform Range of Motion  times a day  - Reposition patient every  hours    - Dangle patient  times a day  - Stand patient  times a day  - Ambulate patient  times a day  - Out of bed to chair  times a day   - Out of bed for meals  times a day  - Out of bed for toileting  - Record patient progress and toleration of activity level   Outcome: Progressing     Problem: DISCHARGE PLANNING  Goal: Discharge to home or other facility with appropriate resources  Description: INTERVENTIONS:  - Identify barriers to discharge w/patient and caregiver  - Arrange for needed discharge resources and transportation as appropriate  - Identify discharge learning needs (meds, wound care, etc )  - Arrange for interpretive services to assist at discharge as needed  - Refer to Case Management Department for coordinating discharge planning if the patient needs post-hospital services based on physician/advanced practitioner order or complex needs related to functional status, cognitive ability, or social support system  Outcome: Progressing     Problem: Nutrition/Hydration-ADULT  Goal: Nutrient/Hydration intake appropriate for improving, restoring or maintaining nutritional needs  Description: Monitor and assess patient's nutrition/hydration status for malnutrition  Collaborate with interdisciplinary team and initiate plan and interventions as ordered  Monitor patient's weight and dietary intake as ordered or per policy  Utilize nutrition screening tool and intervene as necessary  Determine patient's food preferences and provide high-protein, high-caloric foods as appropriate       INTERVENTIONS:  - Monitor oral intake, urinary output, labs, and treatment plans  - Assess nutrition and hydration status and recommend course of action  - Evaluate amount of meals eaten  - Assist patient with eating if necessary   - Allow adequate time for meals  - Recommend/ encourage appropriate diets, oral nutritional supplements, and vitamin/mineral supplements  - Order, calculate, and assess calorie counts as needed  - Recommend, monitor, and adjust tube feedings and TPN/PPN based on assessed needs  - Assess need for intravenous fluids  - Provide specific nutrition/hydration education as appropriate  - Include patient/family/caregiver in decisions related to nutrition  Outcome: Progressing     Problem: MOBILITY - ADULT  Goal: Maintain or return to baseline ADL function  Description: INTERVENTIONS:  -  Assess patient's ability to carry out ADLs; assess patient's baseline for ADL function and identify physical deficits which impact ability to perform ADLs (bathing, care of mouth/teeth, toileting, grooming, dressing, etc )  - Assess/evaluate cause of self-care deficits   - Assess range of motion  - Assess patient's mobility; develop plan if impaired  - Assess patient's need for assistive devices and provide as appropriate  - Encourage maximum independence but intervene and supervise when necessary  - Involve family in performance of ADLs  - Assess for home care needs following discharge   - Consider OT consult to assist with ADL evaluation and planning for discharge  - Provide patient education as appropriate  Outcome: Progressing  Goal: Maintains/Returns to pre admission functional level  Description: INTERVENTIONS:  - Perform BMAT or MOVE assessment daily    - Set and communicate daily mobility goal to care team and patient/family/caregiver  - Collaborate with rehabilitation services on mobility goals if consulted  - Perform Range of Motion  times a day  - Reposition patient every  hours    - Dangle patient  times a day  - Stand patient  times a day  - Ambulate patient  times a day  - Out of bed to chair  times a day   - Out of bed for meals  times a day  - Out of bed for toileting  - Record patient progress and toleration of activity level   Outcome: Progressing

## 2022-12-21 NOTE — ASSESSMENT & PLAN NOTE
61 y o  M with PMHx of metastatic colon cancer s/p exploratory laparotomy, hepatic segment 3 resection, transverse colectomy and reversal of loop colostomy on 79/0, complicated by an anastomotic leak requiring re-exploration, right hemicolectomy 11/16 and subsequent primary ileocolonic anastomosis with diverting loop ileostomy 11/17   Patient was readmitted for acute cholecystitis s/p percutaneous cholecystostomy tube placement on 12/8 and now s/p IR drainage of intra-abdominal fluid collections on 12/20

## 2022-12-21 NOTE — ASSESSMENT & PLAN NOTE
61 y o  M with PMHx of metastatic colon cancer s/p exploratory laparotomy, hepatic segment 3 resection, transverse colectomy and reversal of loop colostomy on 43/5, complicated by an anastomotic leak requiring re-exploration, right hemicolectomy 11/16 and subsequent primary ileocolonic anastomosis with diverting loop ileostomy 11/17   Patient was readmitted for acute cholecystitis s/p percutaneous cholecystostomy tube placement on 12/8 and now s/p IR drainage of intra-abdominal fluid collections on 12/20

## 2022-12-21 NOTE — PROGRESS NOTES
[facility] Occupational Therapy Daily Note  Patient Active Problem List   Diagnosis    Type 2 diabetes mellitus without complication, with long-term current use of insulin ([facility] [address] )    Benign essential hypertension    Mixed hyperlipidemia    Erectile dysfunction    Low testosterone    Obesity (BMI 30-39  9)    Testicular hypogonadism    Incarcerated umbilical hernia    Left ureteral calculus    Type 2 diabetes mellitus with hyperlipidemia (HCC)    Colonic mass    Microcytic anemia    Hypokalemia    Transaminitis    Thrombocytosis    Iron deficiency anemia, unspecified    Malignant neoplasm of transverse colon (HCC)    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)    Colon cancer metastasized to liver (HCC)    Colostomy prolapse (HCC)    Other fatigue    Cervical radiculopathy    Encephalopathy    Flash pulmonary edema (HCC)    MR (mitral regurgitation)    Bacteremia    ESBL (extended spectrum beta-lactamase) producing bacteria infection    Acute respiratory failure with hypoxia (HCC)    Severe protein-calorie malnutrition ([facility] [address] )    Acute kidney injury ([facility] [address] )    Sepsis ([facility] [address] )       Past Medical History:   Diagnosis Date    Abdominal pain 03/12/2022    [facility]     [id number] resolved    Cancer ([facility] [address] )     Diabetes mellitus ([facility] [address] )     Enteritis 08/23/2016    Gastroparesis due to DM ([facility] [address] ) 08/23/2016    GERD (gastroesophageal reflux disease)     Hernia, ventral 08/04/2016    Hyperlipidemia     Hypertension     Morbid obesity ([facility] [address] ) 04/17/2018    Postoperative visit 03/02/2022    SIRS (systemic inflammatory response syndrome) ([facility] [address] ) 03/12/2022    Snoring     Stage 3a chronic kidney disease ([facility] [address] ) 02/19/2022     Etiologic Diagnosis: Critical illness myopathy  Restrictions/Precautions  Precautions: Contact/isolation, Fall Risk, Multiple lines, Supervision on toilet/commode (ileostomy, IR drain, VICTORINO drain)  Weight Bearing Restrictions: No  ROM Restrictions: No  ADL Team Goal: Patient will require assist with ADLs with least restrictive device upon completion of rehab program  Occupational Therapy LTG's  Eating Oral care Bathing LB dress UB dress   Eating Goal: 06  Independent - Patient completes the activity by him/herself with no assistance from a helper  Oral Hygiene Goal: 06  Independent - Patient completes the activity by him/herself with no assistance from a helper  Shower/bathe self Goal: 04  Supervision or touching assistance- Omer provides VERBAL CUES or supervision throughout activity  Lower body dressing Goal: 04  Supervision or touching assistance- Omer provides VERBAL CUES or supervision throughout activity  Upper body dressing Goal: 04  Supervision or touching assistance- Omer provides VERBAL CUES or supervision throughout activity  Toileting Toilet txf Func txf IADL Med    Toileting hygiene Goal: 04  Supervision or touching assistance- Omer provides VERBAL CUES or supervision throughout activity  Toilet transfer Goal: 04  Supervision or touching assistance- Omer provides VERBAL CUES or supervision throughout activity  Assist Level: Supervision   OT interventions: Treatment/Interventions: ADL retraining, Functional transfer training, Therapeutic exercise, Endurance training, Patient/family training, Equipment eval/education, Bed mobility, Compensatory technique education  Discharge Plan:  OT Discharge Recommendation:  (pending progress)   DME: Equipment Recommended:  (TBD),  ,       12/21/22 0900   Pain Assessment   Pain Assessment Tool 0-10   Pain Score 10 - Worst Possible Pain   Pain Location/Orientation Orientation: Left; Location: Back; Location: Abdomen   Pain Onset/Description Onset: Ongoing   Hospital Pain Intervention(s) Repositioned; Shower/Bath   Lifestyle   Autonomy "I have had a revelation "   Oral Hygiene   Type of Assistance Needed Set-up / clean-up   Comment increased time to complete  sitting IN WC at sink   Pt unable to complete unsupported sitting in WC  req set up of items, difficulty reaching items 2* pain   Oral Hygiene CARE Score 5   Shower/Bathe Self   Type of Assistance Needed Physical assistance   Physical Assistance Level 26%-50%   Comment Pt bathing UB in sitting  complete perinal bathing in stance  pt req b/l UE support on sink ledge  assistnace for all feet  feet soaked in basin and lotion applied over body  Shower/Bathe Self CARE Score 3   Upper Body Dressing   Type of Assistance Needed Physical assistance   Physical Assistance Level 26%-50%   Comment assist for shirt over trunk  Upper Body Dressing CARE Score 3   Lower Body Dressing   Type of Assistance Needed Physical assistance   Physical Assistance Level Total assistance   Comment completed in stance with RW  Lower Body Dressing CARE Score 1   Putting On/Taking Off Footwear   Type of Assistance Needed Physical assistance   Physical Assistance Level Total assistance   Putting On/Taking Off Footwear CARE Score 1   Sit to Lying   Type of Assistance Needed Physical assistance   Physical Assistance Level 51%-75%   Comment assist b/l LE, assit for tunk management after supine  inc pain today on L side with new drains   Sit to Lying CARE Score 2   Lying to Sitting on Side of Bed   Type of Assistance Needed Physical assistance   Physical Assistance Level 26%-50%   Lying to Sitting on Side of Bed CARE Score 3   Sit to Stand   Type of Assistance Needed Physical assistance   Physical Assistance Level 51%-75%   Sit to Stand CARE Score 2   Bed-Chair Transfer   Type of Assistance Needed Physical assistance   Physical Assistance Level 26%-50%   Comment SPT w/ RW  cues for DBT and pacing   Chair/Bed-to-Chair Transfer CARE Score 3   Additional Activities   Additional Activities Comments SPO2 95%  after stance   pt cont to require cues and education for deep breathing techniques following exertional activities   Assessment   Treatment Assessment Pt participated in skilled OT session focusing on functional ADL retraining, activity tolerance, activity modification, DBT and functional transfers  See above for details on ADL function  Pt continues to demonstrate slow but steady progress with ADLs  Pt demo improved sitting tolerance and OOB activity tolerance  Cont to discuss OOB activities 3x day with meals in addition to therapy times  Pt agreeable for setting small goals     Prognosis Fair   Plan   Progress Progressing toward goals   OT Therapy Minutes   OT Time In 0900   OT Time Out 1030   OT Total Time (minutes) 90   OT Mode of treatment - Individual (minutes) 90   OT Mode of treatment - Concurrent (minutes) 0   OT Mode of treatment - Group (minutes) 0   OT Mode of treatment - Co-treat (minutes) 0   OT Mode of Treatment - Total time(minutes) 90 minutes   OT Cumulative Minutes 465

## 2022-12-21 NOTE — PCC PHYSICAL THERAPY
Patient making progress with skilled PT intervention thus far however still remains with barriers of medical deconditioning, pain, fatigue, LE weakness and imbalance  These deficits negatively impact patient's ability to engage in acute rehab program, however improving since evaluation  Patient has 2 vs 3 ADRIAN home and a FF to second floor  Pt is performing step but needs more strengthen and endurance to meet DC goals  FT needed before DC date and awaiting for team recommend  Pt did reports his getting all his DME for first floor step up  Tentative plan for discharge Monday; family training in progress  HHPT to be set up      1/10/23  Pt remains limited by inconsistency of act tolerance so cont recommendation is for pt to maintain WC level I at home and HH dist amb with RW with wife providing CS  Pt anticipates possible discharge home today with hospital bed, WC and RW, HHPT and HEP

## 2022-12-22 LAB
ALBUMIN SERPL BCP-MCNC: 1.8 G/DL (ref 3.5–5)
ALP SERPL-CCNC: 1096 U/L (ref 46–116)
ALT SERPL W P-5'-P-CCNC: 17 U/L (ref 12–78)
ANION GAP SERPL CALCULATED.3IONS-SCNC: 7 MMOL/L (ref 4–13)
AST SERPL W P-5'-P-CCNC: 56 U/L (ref 5–45)
BACTERIA BLD CULT: NORMAL
BACTERIA BLD CULT: NORMAL
BACTERIA SPEC ANAEROBE CULT: NORMAL
BACTERIA SPEC ANAEROBE CULT: NORMAL
BACTERIA SPEC BFLD CULT: ABNORMAL
BASOPHILS # BLD AUTO: 0.05 THOUSANDS/ÂΜL (ref 0–0.1)
BASOPHILS NFR BLD AUTO: 0 % (ref 0–1)
BILIRUB DIRECT SERPL-MCNC: 0.86 MG/DL (ref 0–0.2)
BILIRUB SERPL-MCNC: 1.35 MG/DL (ref 0.2–1)
BUN SERPL-MCNC: 17 MG/DL (ref 5–25)
CALCIUM SERPL-MCNC: 9 MG/DL (ref 8.3–10.1)
CHLORIDE SERPL-SCNC: 110 MMOL/L (ref 96–108)
CO2 SERPL-SCNC: 23 MMOL/L (ref 21–32)
CREAT SERPL-MCNC: 1.22 MG/DL (ref 0.6–1.3)
EOSINOPHIL # BLD AUTO: 0 THOUSAND/ÂΜL (ref 0–0.61)
EOSINOPHIL NFR BLD AUTO: 0 % (ref 0–6)
ERYTHROCYTE [DISTWIDTH] IN BLOOD BY AUTOMATED COUNT: 17.7 % (ref 11.6–15.1)
GFR SERPL CREATININE-BSD FRML MDRD: 64 ML/MIN/1.73SQ M
GLUCOSE P FAST SERPL-MCNC: 162 MG/DL (ref 65–99)
GLUCOSE SERPL-MCNC: 147 MG/DL (ref 65–140)
GLUCOSE SERPL-MCNC: 151 MG/DL (ref 65–140)
GLUCOSE SERPL-MCNC: 162 MG/DL (ref 65–140)
GLUCOSE SERPL-MCNC: 189 MG/DL (ref 65–140)
GLUCOSE SERPL-MCNC: 191 MG/DL (ref 65–140)
GLUCOSE SERPL-MCNC: 207 MG/DL (ref 65–140)
GRAM STN SPEC: ABNORMAL
GRAM STN SPEC: ABNORMAL
HCT VFR BLD AUTO: 27.9 % (ref 36.5–49.3)
HGB BLD-MCNC: 8.4 G/DL (ref 12–17)
IMM GRANULOCYTES # BLD AUTO: 0.11 THOUSAND/UL (ref 0–0.2)
IMM GRANULOCYTES NFR BLD AUTO: 1 % (ref 0–2)
LYMPHOCYTES # BLD AUTO: 1.68 THOUSANDS/ÂΜL (ref 0.6–4.47)
LYMPHOCYTES NFR BLD AUTO: 11 % (ref 14–44)
MCH RBC QN AUTO: 26.6 PG (ref 26.8–34.3)
MCHC RBC AUTO-ENTMCNC: 30.1 G/DL (ref 31.4–37.4)
MCV RBC AUTO: 88 FL (ref 82–98)
MONOCYTES # BLD AUTO: 1.45 THOUSAND/ÂΜL (ref 0.17–1.22)
MONOCYTES NFR BLD AUTO: 10 % (ref 4–12)
NEUTROPHILS # BLD AUTO: 11.51 THOUSANDS/ÂΜL (ref 1.85–7.62)
NEUTS SEG NFR BLD AUTO: 78 % (ref 43–75)
NRBC BLD AUTO-RTO: 0 /100 WBCS
PLATELET # BLD AUTO: 366 THOUSANDS/UL (ref 149–390)
PMV BLD AUTO: 10.2 FL (ref 8.9–12.7)
POTASSIUM SERPL-SCNC: 4 MMOL/L (ref 3.5–5.3)
PROT SERPL-MCNC: 8.4 G/DL (ref 6.4–8.4)
RBC # BLD AUTO: 3.16 MILLION/UL (ref 3.88–5.62)
SODIUM SERPL-SCNC: 140 MMOL/L (ref 135–147)
WBC # BLD AUTO: 14.8 THOUSAND/UL (ref 4.31–10.16)

## 2022-12-22 RX ADMIN — HEPARIN SODIUM 5000 UNITS: 5000 INJECTION INTRAVENOUS; SUBCUTANEOUS at 21:33

## 2022-12-22 RX ADMIN — AMLODIPINE BESYLATE 5 MG: 5 TABLET ORAL at 08:12

## 2022-12-22 RX ADMIN — MELATONIN TAB 3 MG 3 MG: 3 TAB at 21:28

## 2022-12-22 RX ADMIN — CHOLECALCIFEROL TAB 10 MCG (400 UNIT) 400 UNITS: 10 TAB at 08:13

## 2022-12-22 RX ADMIN — ACETAMINOPHEN 975 MG: 325 TABLET, FILM COATED ORAL at 21:29

## 2022-12-22 RX ADMIN — OXYCODONE HYDROCHLORIDE 10 MG: 10 TABLET ORAL at 06:51

## 2022-12-22 RX ADMIN — ASPIRIN 81 MG CHEWABLE TABLET 81 MG: 81 TABLET CHEWABLE at 08:10

## 2022-12-22 RX ADMIN — HEPARIN SODIUM 5000 UNITS: 5000 INJECTION INTRAVENOUS; SUBCUTANEOUS at 13:59

## 2022-12-22 RX ADMIN — SIMETHICONE 80 MG: 80 TABLET, CHEWABLE ORAL at 21:28

## 2022-12-22 RX ADMIN — SIMETHICONE 80 MG: 80 TABLET, CHEWABLE ORAL at 08:10

## 2022-12-22 RX ADMIN — INSULIN GLARGINE 12 UNITS: 100 INJECTION, SOLUTION SUBCUTANEOUS at 21:31

## 2022-12-22 RX ADMIN — TAMSULOSIN HYDROCHLORIDE 0.4 MG: 0.4 CAPSULE ORAL at 16:02

## 2022-12-22 RX ADMIN — METHOCARBAMOL 500 MG: 500 TABLET ORAL at 17:14

## 2022-12-22 RX ADMIN — ERTAPENEM SODIUM 1000 MG: 1 INJECTION, POWDER, LYOPHILIZED, FOR SOLUTION INTRAMUSCULAR; INTRAVENOUS at 19:19

## 2022-12-22 RX ADMIN — OXYCODONE HYDROCHLORIDE 5 MG: 5 TABLET ORAL at 16:03

## 2022-12-22 RX ADMIN — ACETAMINOPHEN 975 MG: 325 TABLET, FILM COATED ORAL at 13:59

## 2022-12-22 RX ADMIN — INSULIN LISPRO 1 UNITS: 100 INJECTION, SOLUTION INTRAVENOUS; SUBCUTANEOUS at 11:51

## 2022-12-22 RX ADMIN — INSULIN LISPRO 6 UNITS: 100 INJECTION, SOLUTION INTRAVENOUS; SUBCUTANEOUS at 08:13

## 2022-12-22 RX ADMIN — HEPARIN SODIUM 5000 UNITS: 5000 INJECTION INTRAVENOUS; SUBCUTANEOUS at 05:52

## 2022-12-22 RX ADMIN — PANTOPRAZOLE SODIUM 40 MG: 40 TABLET, DELAYED RELEASE ORAL at 16:02

## 2022-12-22 RX ADMIN — OXYCODONE HYDROCHLORIDE 10 MG: 10 TABLET ORAL at 22:08

## 2022-12-22 RX ADMIN — INSULIN LISPRO 1 UNITS: 100 INJECTION, SOLUTION INTRAVENOUS; SUBCUTANEOUS at 21:30

## 2022-12-22 RX ADMIN — ACETAMINOPHEN 975 MG: 325 TABLET, FILM COATED ORAL at 05:51

## 2022-12-22 RX ADMIN — OXYCODONE HYDROCHLORIDE 2.5 MG: 5 TABLET ORAL at 19:09

## 2022-12-22 RX ADMIN — ATORVASTATIN CALCIUM 40 MG: 40 TABLET, FILM COATED ORAL at 08:10

## 2022-12-22 RX ADMIN — AMLODIPINE BESYLATE 5 MG: 5 TABLET ORAL at 17:14

## 2022-12-22 RX ADMIN — PANTOPRAZOLE SODIUM 40 MG: 40 TABLET, DELAYED RELEASE ORAL at 05:51

## 2022-12-22 RX ADMIN — INSULIN LISPRO 6 UNITS: 100 INJECTION, SOLUTION INTRAVENOUS; SUBCUTANEOUS at 17:14

## 2022-12-22 RX ADMIN — INSULIN LISPRO 1 UNITS: 100 INJECTION, SOLUTION INTRAVENOUS; SUBCUTANEOUS at 08:14

## 2022-12-22 RX ADMIN — Medication 1 TABLET: at 08:10

## 2022-12-22 RX ADMIN — METHOCARBAMOL 500 MG: 500 TABLET ORAL at 08:10

## 2022-12-22 RX ADMIN — SIMETHICONE 80 MG: 80 TABLET, CHEWABLE ORAL at 17:14

## 2022-12-22 NOTE — PROGRESS NOTES
Met with pt briefly to initiate evaluation but he reports feeling too tired and sick to participate  Will retry tomorrow  Thank you for this referral     Felipa Barnard, Ph D

## 2022-12-22 NOTE — CASE MANAGEMENT
Cm made two attempts to meet with pt today but he was sleeping  Cm to follow up tomorrow re: team mtg and barriers to dc

## 2022-12-22 NOTE — PROGRESS NOTES
Progress Note - Infectious Disease   Steffanie Russo 61 y o  male MRN: [id number]  Unit/Bed#: -01 Encounter: [id number]      Impression/Recommendations:  Sepsis     Evolving 12/17:  WBC, HR   Suspect due to persistent left intra-abdominal infection in setting of complex history below, recently completed antibiotics   ROS and exam otherwise negative   Recent Flu/RSV/COVID PCR, CXR negative   Blood cultures negative   Subjectively improved with reinitiation of antibiotics  Rec:  • Continue ertapenem for now  • Follow temperatures and WBC closely  • Supportive care as per the primary service     Intra-abdominal abscess     In the setting complex surgical history as below   Initially developed 11/2022 companied by ESBL E  coli bacteremia   Status post exploratory laparotomy, right hemicolectomy, temporary abdominal closure 11/16; re-exploration, partial left colectomy, primary ileocolonic anastomosis with proximal diverting loop ileostomy, midline fascial closure on 11/17   More recently developed abscess in hepatectomy bed and collection +/- leak at area of prior colonic anastamosis, 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   Status post IR drain into perihepatic collection 12/8   Cultures with ESBL E  Coli   Status post 7 days ertapenem after drain placement through 12/15   Repeat CT 12/17 shows hepatic bed collection improved, persistent left intra-abdominal collection  Status post perigastic, perisplenic drains 12/20    Cultures again with ESBL E  coli  Rec:  • Continue antibiotics as above  • Drains per IR  • If continues to improve anticipate change to PO doxycycline in next 24 hours     Possible acute cholecystitis     Status post percutaneous cholecystostomy tube   Alk phos remains markedly elevated, possibly due to drain itself versus known intrahepatic metastases      Metastatic colon cancer   To liver, possible lung   Status post resection, chemotherapy, more recent hepatic resection and ablation, transverse colon resection      CKD   Baseline Cr 1 4     DM      Anemia   Status post multiple transfusions      The above impression and plan was discussed in detail with the patient      Antibiotics:  Ertapenem #6    Subjective:  Patient seen on AM rounds  Pain controlled with oxycodone  Denies fevers, chills, sweats, nausea, vomiting, or diarrhea  24 Hour Events:  No documented fevers, chills, sweats, nausea, vomiting, or diarrhea  Objective:  Vitals:  Temp:  [97 6 °F (36 4 °C)-98 7 °F (37 1 °C)] 98 6 °F (37 °C)  HR:  [104-106] 104  Resp:  [16-20] 18  BP: (127-158)/(75-89) 129/75  SpO2:  [93 %-96 %] 93 %  Temp (24hrs), Av 3 °F (36 8 °C), Min:97 6 °F (36 4 °C), Max:98 7 °F (37 1 °C)  Current: Temperature: 98 6 °F (37 °C)    Physical Exam:   General:  No acute distress  Psychiatric:  Awake and alert  Pulmonary:  Normal respiratory excursion without accessory muscle use  Abdomen:  Soft, nontender  Extremities:  No edema  Skin:  No rashes    Lab Results:  I have personally reviewed pertinent labs  Results from last 7 days   Lab Units 22  0552 22  0748 22  0816 22  0536   POTASSIUM mmol/L 4 0 4 0  --  3 5   CHLORIDE mmol/L 110* 108  --  105   CO2 mmol/L 23 23  --  24   BUN mg/dL 17 18  --  16   CREATININE mg/dL 1 22 1 28  --  1 19   EGFR ml/min/1 73sq m 64 61  --  66   CALCIUM mg/dL 9 0 8 7  --  8 4   AST U/L 56* 57* 58* 61*   ALT U/L 17 17 17 19   ALK PHOS U/L 1,096* 1,093* 1,025* 989*     Results from last 7 days   Lab Units 22  0552 22  0724 22  0536   WBC Thousand/uL 14 80* 13 62* 12 82*   HEMOGLOBIN g/dL 8 4* 8 7* 8 7*   PLATELETS Thousands/uL 366 376 332     Results from last 7 days   Lab Units 22  1944 22  1906 22  1312 22  1234   BLOOD CULTURE   --   --  No Growth After 4 Days  No Growth After 4 Days     GRAM STAIN RESULT  2+ Polys  No bacteria seen 1+ Gram negative rods*  1+ Gram positive rods*  2+ Polys*  --   --    BODY FLUID CULTURE, STERILE  2+ Growth of Escherichia coli ESBL* 2+ Growth of Escherichia coli ESBL*  --   --        Imaging Studies:   I have personally reviewed pertinent imaging study reports and images in PACS  EKG, Pathology, and Other Studies:   I have personally reviewed pertinent reports

## 2022-12-22 NOTE — WOUND OSTOMY CARE
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  PMH: HTN, HLD, Ventral Hernia, GERD  Patient seen sitting OOB in chair  Reports fatigue and tiredness  Stated that pouch was leaking last night and a new pouch was applied  Pouch is well adhered with no signs of leaking  Will continue to follow  Patient refused turning in the chair for sacro-buttocks assessment- states no pain or tenderness noted in area  Findings:  B/L heels are dry intact and ramon with no skin loss or wounds present  Recommend preventative Hydraguard Cream and proper offloading/ repositioning  Midline Incision & Right Abdomen Wound: 2 Areas Measured together  Midline incision irregular in shape with full thickness skin loss  Wound bed is scattered 30% pink granulation tissue, 55% moist yellow slough tissue, and 15% soft black eschar  There are scattered islands of the slough and eschar throughout the granulation tissue  Cannot appreciate full depth related to slough tissue  Nisa-wound is intact  Edges are not rolled, no tunneling or undermining present  Moderate amount of yellow/serosanguineous drainage noted to area  Right abdomen wound is circular in shape with at least full thickness skin loss  Wound bed is 100% dark yellow moist slough tissue- cannot appreciate full depth related to slough tissue covering wound bed  Nisa-wound is intact, hyperpigmented with intact scar tissue at 3 & 9 o'clock  No tunneling or undermining noted  Moderate yellow/ serosanguineous drainage noted  Continue with mesalt  No induration, fluctuance, odor, warmth/temperature differences, redness, or purulence noted to the above noted wounds and skin areas assessed   New dressings applied per orders listed below  Patient tolerated well- no s/s of non-verbal pain or discomfort observed during the encounter  Bedside nurse aware of plan of care  See flow sheets for more detailed assessment findings  Orders listed below and wound care will continue to follow, call or tiger text with questions  Skin Care Plan:  1-Midline Incision and Right Abdomen Wounds: Cleanse wounds with NS and pat dry  Lightly place/ fluff Mesalt Ribbon into Wound bed  Cover with ABDs and secure with minimal tape on skin  Change Daily or PRN soilage or displacement  2-Turn/reposition q2h or when medically stable for pressure re-distribution on skin   3-Elevate heels to offload pressure  4-Moisturize skin daily with skin nourishing cream  5-Ehob cushion in chair when out of bed  6-Preventative Hydraguard to bilateral heels and bilateral sacro-buttocks BID and PRN  WOUNDS:  Wound 11/07/22 Abdomen N/A (Active)   Wound Image   12/22/22 0918   Wound Description Beefy red;Slough 12/22/22 0918   Nisa-wound Assessment Clean;Dry; Intact 12/22/22 0918   Wound Length (cm) 22 cm 12/22/22 0918   Wound Width (cm) 2 5 cm 12/22/22 0918   Wound Depth (cm) 2 cm 12/22/22 0918   Wound Surface Area (cm^2) 55 cm^2 12/22/22 0918   Wound Volume (cm^3) 110 cm^3 12/22/22 0918   Calculated Wound Volume (cm^3) 110 cm^3 12/22/22 0918   Change in Wound Size % 77 53 12/22/22 0918   Tunneling 0 cm 12/22/22 0918   Tunneling in depth located at 0 12/22/22 0918   Undermining 0 12/22/22 0918   Undermining is depth extending from 0 12/22/22 0918   Wound Site Closure Sutures 12/22/22 0918   Drainage Amount Moderate 12/22/22 0918   Drainage Description Serosanguineous 12/22/22 0918   Non-staged Wound Description Full thickness 12/22/22 0918   Treatments Cleansed 12/22/22 0918   Dressing ABD; Mesalt 12/22/22 0918   Wound packed?  Yes 12/22/22 0918   Packing- # removed 1 12/22/22 0918   Packing- # inserted 1 12/22/22 0918   Dressing Changed New 12/22/22 4217 Patient Tolerance Tolerated well 12/22/22 0918   Dressing Status Clean;Dry; Intact 12/22/22 3727       Wound 12/02/22 Back Left;Medial (Active)                Nora Currie RN, BSN

## 2022-12-22 NOTE — PROGRESS NOTES
12/22/22 1230   Pain Assessment   Pain Location/Orientation Orientation: Left; Location: Abdomen; Location: Back   Pain Onset/Description Onset: Ongoing;Frequency: Intermittent   Effect of Pain on Daily Activities pt indicated some pain, but also didn't transfer this session so pain mainly with adjusting position in bed   Pain Rating: FLACC (Activity) - Face 1   Pain Rating: FLACC (Activity) - Legs 0   Pain Rating: FLACC (Activity) - Activity 1   Pain Rating: FLACC (Activity) - Cry 1   Pain Rating: FLACC (Activity) - Consolability 1   Score: FLACC (Activity) 4   Restrictions/Precautions   Precautions Fall Risk;Contact/isolation;Pain   Cognition   Arousal/Participation Cooperative   Subjective   Subjective pt rpeorted feeling tired and very cold; proivded water and hot tea per his request  he was willing to do some BLE therex in bed to see if this made him feel warmer, covered him wtih more blankets   Other Comments   Comments session focused on checking vitals and bed level therapy; KTC, long sitting hip IR/ER, ankle pumps, hip abd in hooklying against manual resistance, heel slides  adjusted position in bed, provided blankets and hot tea per his request   Assessment   Treatment Assessment Pt was still very fatigued from this morning's OT session, where he did more OOB activity  This session ptw as limited by rigors and feeling very cold/shivering and fatigued  He had sletp through lunch time and he didn't have an appetite to eat anything now  BP was 140/80, blood glucose was 207, temperature 98 4 so WNL; RN notified and rpeorted he has had episodes of shivering before  The oncology physician was in to see the pt during session who also reported white count is trending down   Pt cont to have variable functional mobility performance secondary to medical status/deconditioning, etc  PT POC cont to focus on progressing the conditioning aspect secondary to the variability in performance; therefore still planning on WC level as primary mode fo home  Barriers to Discharge Inaccessible home environment;Decreased caregiver support   PT Barriers   Physical Impairment Decreased strength;Decreased range of motion;Decreased endurance; Impaired balance;Decreased mobility;Pain   Functional Limitation Car transfers; Ramp negotiation;Stair negotiation;Standing;Transfers; Walking; Wheelchair management   Plan   Treatment/Interventions Functional transfer training;LE strengthening/ROM; Therapeutic exercise; Endurance training;Patient/family training;Equipment eval/education; Bed mobility;Gait training   Progress Slow progress, medical status limitations   Recommendation   PT Discharge Recommendation Home with home health rehabilitation   PT Therapy Minutes   PT Time In 1230   PT Time Out 1300   PT Total Time (minutes) 30   PT Mode of treatment - Individual (minutes) 30   PT Mode of treatment - Concurrent (minutes) 0   PT Mode of treatment - Group (minutes) 0   PT Mode of treatment - Co-treat (minutes) 0   PT Mode of Treatment - Total time(minutes) 30 minutes   PT Cumulative Minutes 400   Therapy Time missed   Time missed?  No

## 2022-12-22 NOTE — PLAN OF CARE
Problem: Prexisting or High Potential for Compromised Skin Integrity  Goal: Skin integrity is maintained or improved  Description: INTERVENTIONS:  - Identify patients at risk for skin breakdown  - Assess and monitor skin integrity  - Assess and monitor nutrition and hydration status  - Monitor labs   - Assess for incontinence   - Turn and reposition patient  - Assist with mobility/ambulation  - Relieve pressure over bony prominences  - Avoid friction and shearing  - Provide appropriate hygiene as needed including keeping skin clean and dry  - Evaluate need for skin moisturizer/barrier cream  - Collaborate with interdisciplinary team   - Patient/family teaching  - Consider wound care consult   Outcome: Progressing     Problem: Potential for Falls  Goal: Patient will remain free of falls  Description: INTERVENTIONS:  - Educate patient/family on patient safety including physical limitations  - Instruct patient to call for assistance with activity   - Consult OT/PT to assist with strengthening/mobility   - Keep Call bell within reach  - Keep bed low and locked with side rails adjusted as appropriate  - Keep care items and personal belongings within reach  - Initiate and maintain comfort rounds  - Make Fall Risk Sign visible to staff  - Offer Toileting every 2 Hours, in advance of need  - Initiate/Maintain bed  chair alarm  - Obtain necessary fall risk management equipment: nonskid socks  - Apply yellow socks and bracelet for high fall risk patients  - Consider moving patient to room near nurses station  Outcome: Progressing     Problem: PAIN - ADULT  Goal: Verbalizes/displays adequate comfort level or baseline comfort level  Description: Interventions:  - Encourage patient to monitor pain and request assistance  - Assess pain using appropriate pain scale  - Administer analgesics based on type and severity of pain and evaluate response  - Implement non-pharmacological measures as appropriate and evaluate response  - Consider cultural and social influences on pain and pain management  - Notify physician/advanced practitioner if interventions unsuccessful or patient reports new pain  Outcome: Progressing     Problem: INFECTION - ADULT  Goal: Absence or prevention of progression during hospitalization  Description: INTERVENTIONS:  - Assess and monitor for signs and symptoms of infection  - Monitor lab/diagnostic results  - Monitor all insertion sites, i e  indwelling lines, tubes, and drains  - Monitor endotracheal if appropriate and nasal secretions for changes in amount and color  - [place] appropriate cooling/warming therapies per order  - Administer medications as ordered  - Instruct and encourage patient and family to use good hand hygiene technique  - Identify and instruct in appropriate isolation precautions for identified infection/condition  Outcome: Progressing     Problem: SAFETY ADULT  Goal: Patient will remain free of falls  Description: INTERVENTIONS:  - Educate patient/family on patient safety including physical limitations  - Instruct patient to call for assistance with activity   - Consult OT/PT to assist with strengthening/mobility   - Keep Call bell within reach  - Keep bed low and locked with side rails adjusted as appropriate  - Keep care items and personal belongings within reach  - Initiate and maintain comfort rounds  - Make Fall Risk Sign visible to staff  - Offer Toileting every 2 Hours, in advance of need  - Initiate/Maintain bed  chair alarm  - Obtain necessary fall risk management equipment: nonskid socks  - Apply yellow socks and bracelet for high fall risk patients  - Consider moving patient to room near nurses station  Outcome: Progressing  Goal: Maintain or return to baseline ADL function  Description: INTERVENTIONS:  -  Assess patient's ability to carry out ADLs; assess patient's baseline for ADL function and identify physical deficits which impact ability to perform ADLs (bathing, care of mouth/teeth, toileting, grooming, dressing, etc )  - Assess/evaluate cause of self-care deficits   - Assess range of motion  - Assess patient's mobility; develop plan if impaired  - Assess patient's need for assistive devices and provide as appropriate  - Encourage maximum independence but intervene and supervise when necessary  - Involve family in performance of ADLs  - Assess for home care needs following discharge   - Consider OT consult to assist with ADL evaluation and planning for discharge  - Provide patient education as appropriate  Outcome: Progressing  Goal: Maintains/Returns to pre admission functional level  Description: INTERVENTIONS:  - Perform BMAT or MOVE assessment daily    - Set and communicate daily mobility goal to care team and patient/family/caregiver  - Collaborate with rehabilitation services on mobility goals if consulted  - Perform Range of Motion 3 times a day  - Reposition patient every 2 hours    - Dangle patient 3 times a day  - Stand patient 3 times a day  - Ambulate patient 3 times a day  - Out of bed to chair 3 times a day   - Out of bed for meals 3 times a day  - Out of bed for toileting  - Record patient progress and toleration of activity level   Outcome: Progressing     Problem: DISCHARGE PLANNING  Goal: Discharge to home or other facility with appropriate resources  Description: INTERVENTIONS:  - Identify barriers to discharge w/patient and caregiver  - Arrange for needed discharge resources and transportation as appropriate  - Identify discharge learning needs (meds, wound care, etc )  - Arrange for interpretive services to assist at discharge as needed  - Refer to Case Management Department for coordinating discharge planning if the patient needs post-hospital services based on physician/advanced practitioner order or complex needs related to functional status, cognitive ability, or social support system  Outcome: Progressing     Problem: Nutrition/Hydration-ADULT  Goal: Nutrient/Hydration intake appropriate for improving, restoring or maintaining nutritional needs  Description: Monitor and assess patient's nutrition/hydration status for malnutrition  Collaborate with interdisciplinary team and initiate plan and interventions as ordered  Monitor patient's weight and dietary intake as ordered or per policy  Utilize nutrition screening tool and intervene as necessary  Determine patient's food preferences and provide high-protein, high-caloric foods as appropriate       INTERVENTIONS:  - Monitor oral intake, urinary output, labs, and treatment plans  - Assess nutrition and hydration status and recommend course of action  - Evaluate amount of meals eaten  - Assist patient with eating if necessary   - Allow adequate time for meals  - Recommend/ encourage appropriate diets, oral nutritional supplements, and vitamin/mineral supplements  - Order, calculate, and assess calorie counts as needed  - Recommend, monitor, and adjust tube feedings and TPN/PPN based on assessed needs  - Assess need for intravenous fluids  - Provide specific nutrition/hydration education as appropriate  - Include patient/family/caregiver in decisions related to nutrition  Outcome: Progressing     Problem: MOBILITY - ADULT  Goal: Maintain or return to baseline ADL function  Description: INTERVENTIONS:  -  Assess patient's ability to carry out ADLs; assess patient's baseline for ADL function and identify physical deficits which impact ability to perform ADLs (bathing, care of mouth/teeth, toileting, grooming, dressing, etc )  - Assess/evaluate cause of self-care deficits   - Assess range of motion  - Assess patient's mobility; develop plan if impaired  - Assess patient's need for assistive devices and provide as appropriate  - Encourage maximum independence but intervene and supervise when necessary  - Involve family in performance of ADLs  - Assess for home care needs following discharge   - Consider OT consult to assist with ADL evaluation and planning for discharge  - Provide patient education as appropriate  Outcome: Progressing  Goal: Maintains/Returns to pre admission functional level  Description: INTERVENTIONS:  - Perform BMAT or MOVE assessment daily    - Set and communicate daily mobility goal to care team and patient/family/caregiver  - Collaborate with rehabilitation services on mobility goals if consulted  - Perform Range of Motion 3 times a day  - Reposition patient every 2 hours    - Dangle patient 3 times a day  - Stand patient 3 times a day  - Ambulate patient 3 times a day  - Out of bed to chair 3 times a day   - Out of bed for meals 3 times a day  - Out of bed for toileting  - Record patient progress and toleration of activity level   Outcome: Progressing

## 2022-12-22 NOTE — PROGRESS NOTES
12/22/22 1430   Pain Assessment   Pain Assessment Tool 0-10   Pain Score 6   Pain Location/Orientation Orientation: Left;Orientation: Lower; Location: Back  (drain site)   Pain Onset/Description Onset: Ongoing;Frequency: Constant/Continuous   Effect of Pain on Daily Activities increased pain with movement   Restrictions/Precautions   Precautions Fall Risk;Contact/isolation;Pain;Supervision on toilet/commode;Multiple lines   Weight Bearing Restrictions No   ROM Restrictions No   Cognition   Overall Cognitive Status WFL   Arousal/Participation Cooperative   Attention Attends with cues to redirect   Orientation Level Oriented X4   Memory Within functional limits   Following Commands Follows one step commands with increased time or repetition   Sit to Lying   Type of Assistance Needed Physical assistance;Verbal cues; Adaptive equipment   Physical Assistance Level 51%-75%   Comment A to BLE and trunk 2* increased pain, increased time to complete required   Sit to Lying CARE Score 2   Lying to Sitting on Side of Bed   Type of Assistance Needed Physical assistance;Verbal cues; Adaptive equipment   Physical Assistance Level 51%-75%   Comment A to BLE and trunk 2* increased pain, increased time to complete required   Lying to Sitting on Side of Bed CARE Score 2   Sit to Stand   Comment held off 2* weakness   Bed-Chair Transfer   Comment held off 2* weakness   Transfer Bed/Chair/Wheelchair   Adaptive Equipment Roller Walker   Car Transfer   Reason if not Attempted Safety concerns   Car Transfer CARE Score 88   Walk 50 Feet with Two Turns   Reason if not Attempted Medical concerns   Walk 50 Feet with Two Turns CARE Score 88   Walk 150 Feet   Reason if not Attempted Medical concerns   Walk 150 Feet CARE Score 88   Walking 10 Feet on Uneven Surfaces   Reason if not Attempted Safety concerns   Walking 10 Feet on Uneven Surfaces CARE Score 88   Ambulation   Primary Mode of Locomotion Prior to Admission Walk   Assist Device Roller Liz Jasmine   Findings ynable to tolerate this session   Does the patient walk? 2  Yes   Wheel 50 Feet with Two Turns   Reason if not Attempted Activity not applicable   Wheel 50 Feet with Two Turns CARE Score 9   Wheel 150 Feet   Reason if not Attempted Activity not applicable   Wheel 438 Feet CARE Score 9   Curb or Single Stair   Reason if not Attempted Safety concerns   1 Step (Curb) CARE Score 88   4 Steps   Reason if not Attempted Safety concerns   4 Steps CARE Score 88   12 Steps   Reason if not Attempted Safety concerns   12 Steps CARE Score 88   Picking Up Object   Reason if not Attempted Safety concerns   Picking Up Object CARE Score 88   Therapeutic Interventions   Strengthening supine LAQ, hip ABD, heel slides, glute sets, ankle DF/PF and quad sets 3 x10 reps BLE  Other initially seated EOB to change positions from laying in bed over lunch  Pt too "weak" to attempt standing at this time but agreeable to supine TE's  Assessment   Treatment Assessment Pt limited by increased pain and fatigue this session  He did not feel he would tolerate standing at this time but agreeable to sit EOB and perform supine TE's  Pt with increased pain upon attempting to sit EOB requiring increased A at trunk  End of session pt left positioned and needs in place  Advised pt to take pain medication if due as he stated his pain "was creeping up"  Nurse BODØ aware and told pt to ring if he decides he would like pain medication  At this time pt cont to work on increased act tolerance, increased gait, functional transfers and bed mobility to decrease burden of care  Problem List Decreased strength;Decreased range of motion;Decreased endurance; Impaired balance;Decreased mobility;Pain   Barriers to Discharge Inaccessible home environment;Decreased caregiver support   PT Barriers   Functional Limitation Car transfers; Ramp negotiation;Stair negotiation;Standing;Transfers; Walking; Wheelchair management   Plan Treatment/Interventions Functional transfer training;LE strengthening/ROM; Therapeutic exercise; Endurance training;Patient/family training;Gait training;Bed mobility   Progress Slow progress, medical status limitations   Recommendation   PT Discharge Recommendation Home with home health rehabilitation   Equipment Recommended Walker   PT Therapy Minutes   PT Time In 1430   PT Time Out 1530   PT Total Time (minutes) 60   PT Mode of treatment - Individual (minutes) 60   PT Mode of treatment - Concurrent (minutes) 0   PT Mode of treatment - Group (minutes) 0   PT Mode of treatment - Co-treat (minutes) 0   PT Mode of Treatment - Total time(minutes) 60 minutes   PT Cumulative Minutes 460   Therapy Time missed   Time missed?  No

## 2022-12-22 NOTE — PROGRESS NOTES
12/22/22 6208   Pain Assessment   Pain Assessment Tool 0-10   Pain Score 5   Pain Location/Orientation Orientation: Left;Orientation: Lower; Location: Back   Pain Onset/Description Onset: Ongoing;Frequency: Intermittent   Effect of Pain on Daily Activities increased pain with movement   Patient's Stated Pain Goal No pain   Hospital Pain Intervention(s) Repositioned; Emotional support   Restrictions/Precautions   Precautions Fall Risk;Contact/isolation;Pacemaker;Supervision on toilet/commode   Weight Bearing Restrictions No   ROM Restrictions No   Cognition   Overall Cognitive Status WFL   Arousal/Participation Cooperative   Attention Attends with cues to redirect   Orientation Level Oriented X4   Memory Within functional limits   Following Commands Follows one step commands with increased time or repetition   Lying to Sitting on Side of Bed   Type of Assistance Needed Physical assistance;Verbal cues; Adaptive equipment   Physical Assistance Level 26%-50%   Comment A to trunk with bed rails, HOB slightly elevated, increased time 2* pain  Lying to Sitting on Side of Bed CARE Score 3   Sit to Stand   Type of Assistance Needed Physical assistance;Verbal cues; Adaptive equipment   Physical Assistance Level 26%-50%   Comment from EOB, mattress on max inflate   Sit to Stand CARE Score 3   Bed-Chair Transfer   Type of Assistance Needed Physical assistance;Verbal cues; Adaptive equipment   Physical Assistance Level 26%-50%   Comment SPT to recliner with RW   Chair/Bed-to-Chair Transfer CARE Score 3   Transfer Bed/Chair/Wheelchair   Adaptive Equipment Roller Walker   Car Transfer   Reason if not Attempted Safety concerns   Car Transfer CARE Score 88   Walk 50 Feet with Two Turns   Reason if not Attempted Medical concerns   Walk 50 Feet with Two Turns CARE Score 88   Walk 150 Feet   Reason if not Attempted Medical concerns   Walk 150 Feet CARE Score 88   Walking 10 Feet on Uneven Surfaces   Reason if not Attempted Safety concerns   Walking 10 Feet on Uneven Surfaces CARE Score 88   Ambulation   Primary Mode of Locomotion Prior to Admission Walk   Assist Device Roller Beltran Call   Findings not focus of session   Does the patient walk? 2  Yes   Wheel 50 Feet with Two Turns   Reason if not Attempted Activity not applicable   Wheel 50 Feet with Two Turns CARE Score 9   Wheel 150 Feet   Reason if not Attempted Activity not applicable   Wheel 515 Feet CARE Score 9   Wheelchair mobility   Does the patient use a wheelchair? 0  No   Curb or Single Stair   Reason if not Attempted Safety concerns   1 Step (Curb) CARE Score 88   4 Steps   Reason if not Attempted Safety concerns   4 Steps CARE Score 88   12 Steps   Reason if not Attempted Safety concerns   12 Steps CARE Score 88   Picking Up Object   Reason if not Attempted Safety concerns   Picking Up Object CARE Score 88   Assessment   Treatment Assessment Brief 30 min session with increased focus on getting EOB and performing SPT to recliner to allow pt to eat in recliner  Pt required increased time to move 2* increased pain and generalized weakness  Pt left with needs in place in recliner while eating  Cont POC as tolerated with cont focus on act tolerance, increased functional transfers, gait and bed mobility  Problem List Decreased strength;Decreased range of motion;Decreased endurance; Impaired balance;Decreased mobility;Pain   Barriers to Discharge Inaccessible home environment;Decreased caregiver support   PT Barriers   Functional Limitation Car transfers; Ramp negotiation;Stair negotiation;Standing;Transfers; Walking; Wheelchair management   Plan   Treatment/Interventions Functional transfer training;LE strengthening/ROM; Endurance training; Therapeutic exercise;Patient/family training;Bed mobility;Gait training   Progress Slow progress, medical status limitations   Recommendation   PT Discharge Recommendation Home with home health rehabilitation   Equipment Recommended Walker   PT Therapy Minutes   PT Time In 0830   Therapy Time missed   Time missed?  No

## 2022-12-22 NOTE — PROGRESS NOTES
PM&R PROGRESS NOTE:  Cari Gates 61 y o  male MRN: [id number]  Unit/Bed#: -27 Encounter: [id number]        Rehabilitation Diagnosis: Impairment of mobility, safety and Activities of Daily Living (ADLs) due to Neurologic Conditions:  03 8  Neuromuscular Disorders  Etiology: Critical Illness Myopathy    HPI: Kayley Asencio is a 61 y o  male with a medical history of HTN, HLD, GERD, and ventral hernia that [facility] on 11/7 for an elective surgical procedure due to metastatic colon adenocarcinoma by Dr Gurjit Cannon  [facility] abdomen showed transverse colonic apple core lesion and inumberable hepatic metastases  MRI revealed multiple hepatic metastasis with smaller lesions in left lobe and larger lesions in right lobe  Patient completed chemotherapy, colostomy at that time  On 11/7 patient underwent exploratory laparotomy, segment 3 liver resection, ablation, intraoperative ultrasound, and colostomy reversal  This was c/b left upper quadrant hematoma, imaging showed suspected leak from transverse colon anastomosis  On 11/16, patient underwent exp  Lap which revealed LUQ infected hematoma, defect in transverse colon anastomosis with extravasation of succus  Massively dilated cecum requiring resection  He had a right hemicolectomy, left in discontinuity and temporary abdominal closure  On 11/17, re-exploration, partial descending colectomy, primary colonic anastomosis created with diverting loop ileostomy and midline wound VAC placement  He completed course of IV abx for ESBL bacteremia from abdominal abscess  Midline abdominal incision wet to dry dressings and packing to old stoma site  12/6 abdominal incision noted to have yellow drainage  CT abdomen showed abdominal abscess and distended gallbladder  Patient underwent percutaneous cholecystostomy tube insertion and hepatectomy abscess drainage  ID consulted, IV abx for 1 week   Nephrology consulted d/t ALEXY, creatinine baseline 1 2-1 4  On 12/14, patient experienced chest pain with movement  Cardiology evaluated; troponin level 57; chest pain appeared musculoskeletal with no further work up  Patient deemed medically stable and admitted to [facility] on 12/14/2022  SUBJECTIVE: Patient seen face to face  No acute issues overnight  Continues with pain management concerns, discussed utilizing pain medication before pain level is unbearable  Patient is agreeable to request medication when level reaches 5-6/10  Current pain level is 2/10  He feels everything is moving in a positive direction since the two recent drains were placed  No CP, SOB, fever, chills, N/V      ASSESSMENT: Stable, progressing    PLAN:  1  Pain- Added breakthrough oxycodone IR 2 5 mg every three hours as needed  2  WBC 14 80 (previous 13 62, 12 82), afebrile, on Ertapenem; Hbg 8 4 (previous 8 7), continue to monitor  3  AST 56 (previous 57), ALT stable, bilirubin 1 35 (previous 1 64) with conservative treatment  Alkaline phosphatase 1096 (previous 1093, 1025),   4  Perisplenic VICTORINO drain has gray purulent drainage, left abdominal VICTORINO drain with purulent drainage  Cholecystostomy tube with sanguinous, bilious drainage, midline VICTORINO drain- purulent drainage  Continue to monitor drainage  Contact IR with concerns  Rehabilitation    • Functional barriers:  Endurance, pain  • Continue current rehabilitation plan of care to maximize function      • Functional update:   • PT: mobility- mod A, transfer- mod A, ambulation- mod A  • OT: ADL- bathing mod A, dressing- UB mod A, LB max A, toileting- total assist  • Estimated Discharge: ADD 3 weeks    Pain  • Tylenol, oxycodone    DVT prophylaxis  • Heparin sc    Bladder plan  • Continent, urinal    Bowel plan  • ileostomy      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 liver [facility])  Assessment & Plan  · MRI-multiple hepatic metastasis; smaller lesions in left lobe, larger lesions in right lobe  · 11/7 Exp Lap, resection of hepatic segment 3, resection of transverse colon with reversal of loop colostomy (Dr Laurie Reese)  · 11/16- right hemicolectomy, left discontinuity and temporary abdominal closure device placed  · 11/17- re-exploration, partial descending colectomy, primary colocolonic anastomosis with diverting loop ileostomy, midline VAC placement  · Abdominal incision- yellow drainage; CT showed abdominal abscess and distended gallbladder  · 12/8- IR percutaneous cholecystostomy tube, hepatectomy abscess drain placement  · Monitor incisions, dressings  · Monitor CBC, CMP  · IM consulted for assistance with management  · Follow-up outpatient with hem/onc  · For IR on 12/20 to have additional drains placed for a perisplenic abscess as well as a splenic recess abscess  Antibiotics have been reinitiated earlier this week    * Malignant neoplasm of transverse colon [facility])  Assessment & Plan  · Transverse colonic apple core lesion and innumerable hepatic metastases  · Colostomy 2/22  · RCW port-a-cath, accessed  · S/p palliative chemo  · Follows with Dr Lisa Farias (palliative)  · Follows hem/onc (Dr Michael Mark)    Sepsis [facility])  Assessment & Plan  · SIRS versus sepsis at this time given his vitals, leukocytosis, and complicated infection history  · Had finished his course of Zosyn on 1212 and ertapenem by 1215  · Consultation to infectious disease, appreciate their recommendations  · Drawing blood cultures x2 restarting his ertapenem and obtaining a CT abdomen and pelvis at this time  · Currently improved on ertapenem, which was restarted due to increasing leukocytosis  · 12/20-IR perisplenic, perigastric drain placement for 2 additional abscesses-  · Perigastric abscess +E  Coli ESBL  · Follow-up with blood cultures    Acute kidney injury ([facility] [address] )  Assessment & Plan  · Creatinine baseline 1 2-1 4  · Current creatinine 1 28  · Consider consult nephrology  · Monitor BMP    Bacteremia  Assessment & Plan  · Abdominal abscess- ESBL E Coli  · Zosyn/Ertapenam 7 days (Ertapenem completed 12/15, Zosyn 12/12)  · Contact precautions  · VICTORINO drain- milky drainage  · Due to elevated leukocytosis over this past weekend reconsulted infectious disease and continues on ertapenem, potential convert to PO doxycycline    Iron deficiency anemia, unspecified  Assessment & Plan  · Hbg 8 4 (previously 8 7, 9 3, 8 0,7 3)  · 12/15-1 unit PRBc  · 12/16- symptomatic- 1 unit PRBc  · Monitor CBC    Obesity (BMI 30-39  9)  Assessment & Plan  · BMI 33 0  · Diet and lifestyle modifications  · Nutrition consult    Benign essential hypertension  Assessment & Plan  · Home: Lisinopril  · Here: Norvasc 5 mg BID  · Adjust medication as needed  · IM consult for assistance with management  · Monitored outpatient by PCP    Type 2 diabetes mellitus without complication, with long-term current use of insulin (HCC)  Assessment & Plan  · HbgA1c 8 0 (9/22)  · Home monitoring with Nathan Vadim  · Home: Lantus 12 units, Humalog 3 units, Januvia  · Here: Humalog 3 units with meals, SSI, Lantus   · Follow with PCP (Dr Darcy Desir)      [address] consultants medical co-management  Labs, medications, and imaging reviewed  ROS:  Review of Systems   A 10 point review of systems was negative except for what is noted in the HPI  OBJECTIVE:   /75   Pulse (!) 116   Temp 98 6 °F (37 °C) (Oral)   Resp 18   Ht 5' 10" (1 778 m)   Wt 104 kg (230 lb)   SpO2 93%   BMI 33 00 kg/m²     Physical Exam  Constitutional:       Appearance: Normal appearance  He is obese  HENT:      Head: Normocephalic and atraumatic  Mouth/Throat:      Mouth: Mucous membranes are moist    Cardiovascular:      Rate and Rhythm: Regular rhythm  Tachycardia present  Pulses: Normal pulses  Heart sounds: Normal heart sounds  Pulmonary:      Effort: Pulmonary effort is normal       Breath sounds: Normal breath sounds  Abdominal:      Palpations: Abdomen is soft  Tenderness:  There is abdominal tenderness  Comments: +ileostomy, + perc radha tube, + L abscess VICTORINO drain, + perisplenic VICTORINO drain, + perigastric VICTORINO drain   Musculoskeletal:         General: Normal range of motion  Cervical back: Normal range of motion  Skin:     General: Skin is warm and dry  Capillary Refill: Capillary refill takes less than 2 seconds  Neurological:      Mental Status: He is alert and oriented to person, place, and time  Motor: Weakness present     Psychiatric:         Mood and Affect: Mood normal          Judgment: Judgment normal         MMT:   Strength:   Right  Left  Site  Right  Left  Site    4 4  S Ab: Shoulder Abductors  4  4  HF: Hip Flexors    5 5  EF: Elbow Flexors  5  5 KF: Knee Flexors    5  5  EE: Elbow Extensors  5  5  KE: Knee Extensors    5  5  WE: Wrist Extensors  5  5  DR: Dorsi Flexors    5  5  FF: Finger Flexors  4  4  PF: Plantar Flexors    5  5  HI: Hand Intrinsics  5  5  EHL: Extensor Hallucis Longus       Lab Results   Component Value Date    WBC 14 80 (H) 12/22/2022    HGB 8 4 (L) 12/22/2022    HCT 27 9 (L) 12/22/2022    MCV 88 12/22/2022     12/22/2022     Lab Results   Component Value Date    SODIUM 140 12/22/2022    K 4 0 12/22/2022     (H) 12/22/2022    CO2 23 12/22/2022    BUN 17 12/22/2022    CREATININE 1 22 12/22/2022    GLUC 162 (H) 12/22/2022    CALCIUM 9 0 12/22/2022     Lab Results   Component Value Date    INR 1 12 12/19/2022    INR 1 10 11/12/2022    INR 1 24 (H) 11/09/2022    PROTIME 14 7 (H) 12/19/2022    PROTIME 14 4 11/12/2022    PROTIME 15 8 (H) 11/09/2022       Current Facility-Administered Medications:   •  acetaminophen (TYLENOL) tablet 975 mg, 975 mg, Oral, Q8H [facility], GAURANG Keith, 975 mg at 12/22/22 0553  •  amLODIPine (NORVASC) tablet 5 mg, 5 mg, Oral, BID, GAURANG Keith, 5 mg at 12/22/22 3394  •  aspirin chewable tablet 81 mg, 81 mg, Oral, Daily, GAURANG Keith, 81 mg at 12/22/22 0810  •  atorvastatin (LIPITOR) tablet 40 mg, 40 mg, Oral, Daily, GAURANG Keith, 40 mg at 12/22/22 5992  •  calcium carbonate (TUMS) chewable tablet 500 mg, 500 mg, Oral, BID PRN, GAURANG Keith, 500 mg at 12/19/22 1715  •  cholecalciferol (VITAMIN D3) tablet 400 Units, 400 Units, Oral, Daily, GAURANG Keith, 400 Units at 12/22/22 0813  •  ertapenem (INVanz) 1,000 mg in sodium chloride 0 9 % 50 mL IVPB, 1,000 mg, Intravenous, Q24H, Felice Rizzo MD, Stopped at 12/21/22 1934  •  heparin (porcine) subcutaneous injection 5,000 Units, 5,000 Units, Subcutaneous, Q8H [facility], GAURANG Keith, 5,000 Units at 12/22/22 5160  •  insulin glargine (LANTUS) subcutaneous injection 12 Units 0 12 mL, 12 Units, Subcutaneous, HS, GAURANG Bradshaw, 12 Units at 12/21/22 2136  •  insulin lispro (HumaLOG) 100 units/mL subcutaneous injection 1-5 Units, 1-5 Units, Subcutaneous, HS, GAURANG Keith, 1 Units at 12/21/22 2136  •  insulin lispro (HumaLOG) 100 units/mL subcutaneous injection 1-6 Units, 1-6 Units, Subcutaneous, TID AC, 1 Units at 12/22/22 1151 **AND** Fingerstick Glucose (POCT), , , TID AC, GAURANG Keith  •  insulin lispro (HumaLOG) 100 units/mL subcutaneous injection 6 Units, 6 Units, Subcutaneous, TID With Meals, GAURANG Rodriguez, 6 Units at 12/22/22 0813  •  iohexol (OMNIPAQUE) 240 MG/ML solution 50 mL, 50 mL, Oral, 90 min pre-procedure, Felice Rizzo MD  •  melatonin tablet 3 mg, 3 mg, Oral, HS, GAURANG Keith, 3 mg at 12/21/22 1952  •  methocarbamol (ROBAXIN) tablet 500 mg, 500 mg, Oral, BID, GAURANG Keith, 500 mg at 12/22/22 0810  •  multivitamin-minerals (CENTRUM) tablet 1 tablet, 1 tablet, Oral, Daily, GAURANG Keith, 1 tablet at 12/22/22 0810  •  ondansetron (ZOFRAN-ODT) dispersible tablet 4 mg, 4 mg, Oral, Q6H PRN, GAURANG Keith, 4 mg at 12/17/22 0851  •  oxyCODONE (ROXICODONE) immediate release tablet 10 mg, 10 mg, Oral, Q6H PRN, GAURANG Keith, 10 mg at 12/22/22 4839  •  oxyCODONE (ROXICODONE) IR tablet 2 5 mg, 2 5 mg, Oral, Q3H PRN, GAURANG Keith  •  oxyCODONE (ROXICODONE) IR tablet 5 mg, 5 mg, Oral, Q6H PRN, GAURANG Keith  •  pantoprazole (PROTONIX) EC tablet 40 mg, 40 mg, Oral, BID AC, GAURANG Keith, 40 mg at 12/22/22 0551  •  simethicone (MYLICON) chewable tablet 80 mg, 80 mg, Oral, 4x Daily (with meals and at bedtime), GAURANG Keith, 80 mg at 12/22/22 0810  •  tamsulosin (FLOMAX) capsule 0 4 mg, 0 4 mg, Oral, Daily With Dinner, GAURANG Menon, 0 4 mg at 12/21/22 1727    Past Medical History:   Diagnosis Date   • Abdominal pain 03/12/2022   • Acute renal failure ([place] [id number] )     [id number] resolved   • Cancer ([place] [id number] )    • Diabetes mellitus ([place] [id number] )    • Enteritis 08/23/2016   • Gastroparesis due to DM ([place] [id number] ) 08/23/2016   • GERD (gastroesophageal reflux disease)    • Hernia, ventral 08/04/2016   • Hyperlipidemia    • Hypertension    • Morbid obesity ([place] [address] ) 04/17/2018   • Postoperative visit 03/02/2022   • SIRS (systemic inflammatory response syndrome) ([place] [id number] ) 03/12/2022   • Snoring    • Stage 3a chronic kidney disease ([place] [id number] ) 02/19/2022       Patient Active Problem List    Diagnosis Date Noted   • 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 liver ([place] [id number] ) 03/02/2022   • Malignant neoplasm of transverse colon ([place] [id number] ) 03/01/2022   • Sepsis ([place] [address] ) 12/17/2022   • Severe protein-calorie malnutrition ([place] [address] ) 12/14/2022   • Acute kidney injury ([place] [address] ) 12/14/2022   • Flash pulmonary edema ([place] [address] ) 11/12/2022   • MR (mitral regurgitation) 11/12/2022   • Bacteremia 11/12/2022   • ESBL (extended spectrum beta-lactamase) producing bacteria infection 11/12/2022   • Acute respiratory failure with hypoxia ([facility] [facility] ) 11/12/2022   • Encephalopathy 11/09/2022   • Cervical radiculopathy 10/26/2022   • Other fatigue 06/15/2022   • Colostomy prolapse ([place] [address] ) 05/27/2022   • Colon cancer metastasized to liver ([place] [address] ) 03/12/2022   • Iron deficiency anemia, unspecified 03/01/2022   • Thrombocytosis 02/21/2022   • Hypokalemia 02/19/2022   • Transaminitis 02/19/2022   • Type 2 diabetes mellitus with hyperlipidemia ([email or web address] ) 02/18/2022   • Colonic mass 02/18/2022   • Microcytic anemia 02/18/2022   • Left ureteral calculus 01/30/2020   • Incarcerated umbilical hernia 60/60/2970   • Testicular hypogonadism 06/19/2017   • Low testosterone 05/30/2017   • Type 2 diabetes mellitus without complication, with long-term current use of insulin ([facility] ) 08/23/2016   • Benign essential hypertension 08/23/2016   • Mixed hyperlipidemia 08/23/2016   • Erectile dysfunction 07/11/2016   • Obesity (BMI 30-39 9) 07/11/2016      GAURANG Yadav  Physical Medicine and Chris Luu  Total visit time: 30 minutes, with more than 50% spent counseling/coordinating care  Counseling includes discussion with patient re: progress in therapies, functional issues observed by therapy staff, and discussion with patient regarding their current medical state and wellbeing  Coordination of patient's care was performed in conjunction with Internal Medicine service to monitor patient's labs, vitals, and management of their comorbidities

## 2022-12-22 NOTE — PROGRESS NOTES
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  • s/p hepatic resection and reversal of colostomy on 11/7  • Return to the OR 11/16 for right hemicolectomy with partial descending colectomy colocolonic anastomosis with loop ileostomy and mid line abdominal VAC placement  • Continue local wound care; WC following  • 12/21:  Ileostomy = 450ml, radha tube = 15ml, VICTORINO drains = 15-20-20 ml; U/O = 500   the 2 newest JPs are draining seroue to SS and the oldest one is still milky jonas   • D/w Geovanna Armstrong from [facility]  = if ileostomy is >2L for 2 days in a row then to give IVF  • CT  Abd/pelvis 12/17 = loculated collection along splenic recess   Has perisplenic hilum collection as well --> to IR 12/20 for drain placement in both perigastric and perisplenic area and cx sent from both areas = Ecoli  • Flush drains with 10ml qd; tube check 2 weeks 1/3/23     Acute cholecystitis  • s/p percutaneous tube placement 12/8 with tube check on 12/12  • Currently draining bloody discharge  • IR evaluated 12/14 states perc radha tube drainage may remain bloody for several days  • No further abdominal pain  • GI following due to LFT elevation = felt alkaline phosphatase elevated 2/2 infiltrative disease rather than focal biliary obstruction   AMA was negative     • Alkaline phosphatase isoenzyme sent 12/17 = GI aware that has resulted  They recommended continuing to monitor his LFTs  • On 12/21, TB was up to 1 64 from 1 33 so notified GI = if continues to uptrend then he may need MRCP    They rec'd check LFTs QOD unless has fevers or worsening abd pain (will start QOD 12/22)   • Today TB back down to 1 3     Bacteremia/abdominal abscess  • ID now following due to worsening leukocytosis and restarted Ertapenem    • Follow-up blood cultures from 12/17 = NG at 4 days  • CT abdomen and pelvis as above  • Had an episode of shivering shortly after returning from OT which resolved  Was afebrile at the time but HR is up to 123  • Will change VS to q2 hrs x 3 then to q4hrs to make sure not going to spike temp     Hypertension  • On Norvasc 5mg BID  • stable     Diabetes mellitus  • On Lantus 12U qhs/Lispro 6U TID  • Gave Lispro 6U 12/19 since NPO after midnight and then again 12/20 since so late returning from IR and eating  • He ate breakfast very well but not lunch since too full and the lispro was held then  • Continue diabetic diet and QID Accuchecks with SSI     Acute on chronic renal failure  • Stage III; baseline 1 4  • Lisinopril currently on hold  • is 1 22 today 12/21  • stable     Anemia  • Multifactorial due to recent infection, surgery, CKD stage III  • Transfused on 12/15 for hemoglobin 7 3 and 12/16/22 for hgb of 8   • Hgb currently stable at 8 4     Atypical chest pain  • With elevation in troponin/EKG changes  • Cardiology cleared pt for discharge  • Did not recommend any further work up  • On 12/15 had CP = Cards saw and felt was M/S     Situational depression  • Neuropsychology consulted  • Patient not interested in medications at this point in time     Malnutrition  • Recommend dietician consultation to optimize wound healing  • Glucerna makes him nauseated  • Breads etc make him too full  • On 12/15, ordered double protein  • NPO today for IR     Pleural effusion  • CXR on 12/14 showed small left pleural effusion and was suspect for CHF  • ECHO 11/9/22 = LVEF 55%, unable to assess diastolic function, mild TR  • No SOB and sats are stable on RA  • Will watch for now        Discharge date:  Team       The above assessment and plan was reviewed and updated as determined by my evaluation of the patient on 12/22/2022      Labs:   Results from last 7 days   Lab Units 12/22/22  0552 12/21/22  0724   WBC Thousand/uL 14 80* 13 62*   HEMOGLOBIN g/dL 8 4* 8 7*   HEMATOCRIT % 27 9* 28 0*   PLATELETS Thousands/uL 366 376     Results from last 7 days   Lab Units 12/22/22  0552 12/21/22  0748   SODIUM mmol/L 140 138   POTASSIUM mmol/L 4 0 4 0   CHLORIDE mmol/L 110* 108   CO2 mmol/L 23 23   BUN mg/dL 17 18   CREATININE mg/dL 1 22 1 28   CALCIUM mg/dL 9 0 8 7         Results from last 7 days   Lab Units 12/19/22  0841   INR  1 12     Results from last 7 days   Lab Units 12/22/22  1042 12/22/22  0647 12/21/22  2117   POC GLUCOSE mg/dl 189* 151* 206*       Review of Scheduled Meds:  Current Facility-Administered Medications   Medication Dose Route Frequency Provider Last Rate   • acetaminophen  975 mg Oral Q8H [address] GAURANG Keith     • amLODIPine  5 mg Oral BID GAURANG Keith     • aspirin  81 mg Oral Daily GAURANG Keith     • atorvastatin  40 mg Oral Daily GAURANG Keith     • calcium carbonate  500 mg Oral BID PRN GAURANG Medina     • cholecalciferol  400 Units Oral Daily GAURANG Keith     • ertapenem  1,000 mg Intravenous Q24H Amy Alvarado MD Stopped (12/21/22 1934)   • heparin (porcine)  5,000 Units Subcutaneous Q8H [address] GAURANG Keith     • insulin glargine  12 Units Subcutaneous HS GAURANG Arias     • insulin lispro  1-5 Units Subcutaneous HS GAURANG Keith     • insulin lispro  1-6 Units Subcutaneous TID AC GAURANG Keith     • insulin lispro  6 Units Subcutaneous TID With Meals GAURANG Arias     • iohexol  50 mL Oral 90 min pre-procedure Amy Alvarado MD     • melatonin  3 mg Oral HS GAURANG Keith     • methocarbamol  500 mg Oral BID GAURANG Medina     • multivitamin-minerals  1 tablet Oral Daily GAURANG Keith     • ondansetron  4 mg Oral Q6H PRN GAURANG Keith     • oxyCODONE  10 mg Oral Q6H PRN GAURANG Keith     • oxyCODONE  2 5 mg Oral Q3H PRN GAURANG Keith     • oxyCODONE  5 mg Oral Q6H PRN Jame Aiken, GAURANG     • pantoprazole  40 mg Oral BID GAURANG Marinelli     • simethicone  80 mg Oral 4x Daily (with meals and at bedtime) GAURANG Glover     • tamsulosin  0 4 mg Oral Daily With Dinner GAURANG Glover         Subjective/ HPI: Patient seen and examined  Patients overnight issues or events were reviewed with nursing or staff during rounds or morning huddle session  New or overnight issues include the following:     No new or overnight issues  Offers no complaints except he had an episode of shivering earlier today not long after being OOB for therapy  He thinks he just got too cold    ROS:   A 10 point ROS was performed; negative except as noted above  *Labs /Radiology studies reviewed  *Medications reviewed and reconciled as needed  *Please refer to order section for additional ordered labs studies  *Case discussed with primary attending during morning huddle case rounds    Physical Examination:  Vitals:   Vitals:    12/21/22 1727 12/21/22 2019 12/22/22 0558 12/22/22 0812   BP: 127/79 158/89 150/82 129/75   BP Location:  Left arm Right arm    Pulse:  105 104    Resp:  20 18    Temp:  97 6 °F (36 4 °C) 98 6 °F (37 °C)    TempSrc:  Oral Oral    SpO2:  94% 93%    Weight:       Height:           General Appearance: no distress, conversive  HEENT:  External ear normal   Nose normal w/o drainage  Mucous membranes are moist  Oropharynx is clear  Conjunctiva clear w/o icterus or redness  Neck:  Supple, normal ROM  Lungs: BBS without crackles/wheeze/rhonchi; respirations unlabored with normal inspiratory/expiratory effort  No retractions noted  On RA  CV: regular rate and rhythm; no rubs/murmurs/gallops, PMI normal   ABD: Abdomen is soft  Bowel sounds all quadrants  Nontender with no distention      EXT: no edema  Skin: normal turgor, normal texture, no rashes  Psych: affect normal, mood normal  Neuro: AAO      The above physical exam was reviewed and updated as determined by my evaluation of the patient on 12/22/2022  Invasive Devices     Central Venous Catheter Line  Duration           Port A Cath 03/10/22 Right Chest 287 days          Drain  Duration           Ileostomy LUQ 34 days    Abscess Drain Abdomen 13 days    Cholecystostomy Tube 9 days    Abscess Drain Abdomen 1 day    Abscess Drain Back 1 day                   VTE Pharmacologic Prophylaxis: Heparin  Code Status: Level 1 - Full Code  Current Length of Stay: 8 day(s)      Total time spent:  30 minutes with more than 50% spent counseling/coordinating care  Counseling includes discussion with patient re: progress  and discussion with patient of his/her current medical state/information  Coordination of patient's care was performed in conjunction with primary service  Time invested included review of patient's labs, vitals, and management of their comorbidities with continued monitoring  In addition, this patient was discussed with medical team including physician and advanced extenders  The care of the patient was extensively discussed and appropriate treatment plan was formulated unique for this patient  Medical decision making for the day was made by supervising physician unless otherwise noted in their attestation statement  ** Please Note:  voice to text software may have been used in the creation of this document   Although proof errors in transcription or interpretation are a potential of such software**

## 2022-12-22 NOTE — PROGRESS NOTES
Progress Note - Surgical Oncology  [facility] 61 y o  male MRN: [id number]  Unit/Bed#: -61 Encounter: [id number]    Assessment:  61 y o  M with PMHx of metastatic colon cancer s/p exploratory laparotomy, hepatic segment 3 resection, transverse colectomy and reversal of loop colostomy on 96/7, complicated by an anastomotic leak requiring re-exploration, right hemicolectomy 11/16 and subsequent primary ileocolonic anastomosis with diverting loop ileostomy 11/17  Patient was readmitted for acute cholecystitis s/p percutaneous cholecystostomy tube placement on 12/8 and now s/p IR drainage of intra-abdominal fluid collections on 12/20    Afebrile  WBC pending for today, 13 from 12 previously    Plan:  Diet as tolerated  Maintain IR drains   Continue Abx per ID - Ertapenem  Maintain perc radha tube  Continue ileostomy care and teaching  Prn analgesia  PT/OT  DVT ppx   Remainder of care per primary team    Subjective/Objective     Subjective: No acute events overnight  Afebrile, hemodynamically stable  Tolerating diet  Patient endorsing pain around the drain site which seems improved  Objective:     Blood pressure 150/82, pulse 104, temperature 98 6 °F (37 °C), temperature source Oral, resp  rate 18, height 5' 10" (1 778 m), weight 104 kg (230 lb), SpO2 93 %  ,Body mass index is 33 kg/m²        Intake/Output Summary (Last 24 hours) at 12/22/2022 7862  Last data filed at 12/21/2022 2019  Gross per 24 hour   Intake 200 ml   Output 1020 ml   Net -820 ml       Invasive Devices     Central Venous Catheter Line  Duration           Port A Cath 03/10/22 Right Chest 286 days          Drain  Duration           Ileostomy LUQ 34 days    Abscess Drain Abdomen 13 days    Cholecystostomy Tube 9 days    Abscess Drain Abdomen 1 day    Abscess Drain Back 1 day              Physical Exam:  General: No acute distress, alert and oriented  CV: Well perfused, regular rate and rhythm  Lungs: Normal work of breathing, no increased respiratory effort  Abdomen: Soft, tender around drain sites, non-distended  Perc cholecystostomy tube in place, and three IR drains    Extremities: No edema, clubbing or cyanosis  Skin: Warm, dry        Lab, Imaging and other studies:  CBC:   Lab Results   Component Value Date    WBC 13 62 (H) 12/21/2022    HGB 8 7 (L) 12/21/2022    HCT 28 0 (L) 12/21/2022    MCV 86 12/21/2022     12/21/2022    MCH 26 8 12/21/2022    MCHC 31 1 (L) 12/21/2022    RDW 17 7 (H) 12/21/2022    MPV 9 8 12/21/2022    NRBC 0 12/21/2022   , CMP:   Lab Results   Component Value Date    SODIUM 138 12/21/2022    K 4 0 12/21/2022     12/21/2022    CO2 23 12/21/2022    BUN 18 12/21/2022    CREATININE 1 28 12/21/2022    CALCIUM 8 7 12/21/2022    AST 57 (H) 12/21/2022    ALT 17 12/21/2022    ALKPHOS 1,093 (H) 12/21/2022    EGFR 61 12/21/2022     VTE Pharmacologic Prophylaxis: Heparin  VTE Mechanical Prophylaxis: sequential compression device

## 2022-12-22 NOTE — PROGRESS NOTES
[facility] Occupational Therapy Daily Note  Patient Active Problem List   Diagnosis    Type 2 diabetes mellitus without complication, with long-term current use of insulin ([facility] )    Benign essential hypertension    Mixed hyperlipidemia    Erectile dysfunction    Low testosterone    Obesity (BMI 30-39  9)    Testicular hypogonadism    Incarcerated umbilical hernia    Left ureteral calculus    Type 2 diabetes mellitus with hyperlipidemia (HCC)    Colonic mass    Microcytic anemia    Hypokalemia    Transaminitis    Thrombocytosis    Iron deficiency anemia, unspecified    Malignant neoplasm of transverse colon (HCC)    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)    Colon cancer metastasized to liver (HCC)    Colostomy prolapse (HCC)    Other fatigue    Cervical radiculopathy    Encephalopathy    Flash pulmonary edema (HCC)    MR (mitral regurgitation)    Bacteremia    ESBL (extended spectrum beta-lactamase) producing bacteria infection    Acute respiratory failure with hypoxia (HCC)    Severe protein-calorie malnutrition ([facility] )    Acute kidney injury ([facility] )    Sepsis ([facility] )       Past Medical History:   Diagnosis Date    Abdominal pain 03/12/2022    Acute renal failure ([facility] )     [id number] resolved    Cancer ([facility] )     Diabetes mellitus ([facility] )     Enteritis 08/23/2016    Gastroparesis due to DM ([facility] ) 08/23/2016    GERD (gastroesophageal reflux disease)     Hernia, ventral 08/04/2016    Hyperlipidemia     Hypertension     Morbid obesity ([facility] ) 04/17/2018    Postoperative visit 03/02/2022    SIRS (systemic inflammatory response syndrome) ([facility] ) 03/12/2022    Snoring     Stage 3a chronic kidney disease ([facility] ) 02/19/2022     Etiologic Diagnosis: Critical illness myopathy  Restrictions/Precautions  Precautions: Contact/isolation, Fall Risk, Multiple lines, Supervision on toilet/commode (ileostomy, IR drain, VICTORINO drain)  Weight Bearing Restrictions: No  ROM Restrictions: No  ADL Team Goal: Patient will require assist with ADLs with least restrictive device upon completion of rehab program  Occupational Therapy LTG's  Eating Oral care Bathing LB dress UB dress   Eating Goal: 06  Independent - Patient completes the activity by him/herself with no assistance from a helper  Oral Hygiene Goal: 06  Independent - Patient completes the activity by him/herself with no assistance from a helper  Shower/bathe self Goal: 04  Supervision or touching assistance- [place] provides VERBAL CUES or supervision throughout activity  Lower body dressing Goal: 04  Supervision or touching assistance- [place] provides VERBAL CUES or supervision throughout activity  Upper body dressing Goal: 04  Supervision or touching assistance- [place] provides VERBAL CUES or supervision throughout activity  Toileting Toilet txf Func txf IADL Med    Toileting hygiene Goal: 04  Supervision or touching assistance- [place] provides VERBAL CUES or supervision throughout activity  Toilet transfer Goal: 04  Supervision or touching assistance- [place] provides VERBAL CUES or supervision throughout activity  Assist Level: Supervision   OT interventions: Treatment/Interventions: ADL retraining, Functional transfer training, Therapeutic exercise, Endurance training, Patient/family training, Equipment eval/education, Bed mobility, Compensatory technique education  Discharge Plan:  OT Discharge Recommendation:  (pending progress)   DME: Equipment Recommended:  (TBD),  ,      12/22/22 0900   Pain Assessment   Pain Assessment Tool FLACC   Pain Location/Orientation Orientation: Left;Orientation: Lower; Location: Abdomen; Location: Back   Pain Rating: FLACC (Activity) - Face 2   Pain Rating: FLACC (Activity) - Legs 1   Pain Rating: FLACC (Activity) - Activity 1   Pain Rating: FLACC (Activity) - Cry 1   Pain Rating: FLACC (Activity) - Consolability 1   Score: FLACC (Activity) 6   Lifestyle   Autonomy "Maybe its risky, but I want to try "   Oral Hygiene   Type of Assistance Needed Independent   Comment seated at sink in [facility]  pt able to reach items on shelf today  Oral Hygiene CARE Score 6   Grooming   Findings Shaving task sitting at sink in WC  Pt req assistance for thoroughness 2* poor vision  pt did not want to wear glasses to increase success  Upper Body Dressing   Type of Assistance Needed Physical assistance   Physical Assistance Level 26%-50%   Upper Body Dressing CARE Score 3   Sit to Stand   Type of Assistance Needed Physical assistance   Physical Assistance Level 26%-50%   Sit to Stand CARE Score 3   Bed-Chair Transfer   Type of Assistance Needed Physical assistance   Physical Assistance Level 26%-50%   Comment SPT w/ RW  pt able to complete walk to from recliner to sink, and toilet to recliner  Chair/Bed-to-Chair Transfer CARE Score 3   Toileting Hygiene   Type of Assistance Needed Physical assistance   Physical Assistance Level 51%-75%   Comment complete stance at toilet for urination in urinal    Toileting Hygiene CARE Score 2   Assessment   Treatment Assessment Pt participated in skilled OT treatment session with treatment focus on fxnl xfers, standing tolerance, standing balance and OOB tolerance  pt is upright and Out of bed for breakfast meal as discussed in prior sessions  Pt tolerated session well, with improved tolerance for func ambulation in addition to ADL tasks  Pt w/ HR up to 128 following toileting and func ambulation to recliner  4 min to recover to 112bpm  Pt reporting feeling motivated to complete tasks in standing today   Continued plan of care for OT sessions to focus on the following areas:  ADL Retraining , LB Dressing,  LHAE education/training, Functional Transfers, Functional Cognition, Standing tolerance, Standing balance , Fine motor coordination, Gross motor coordination, Fine motor strengthening , Gross motor strengthening , DME training/education, Family training/education, Energy conservation training/education, healthy coping education, Leisure and social pursuits, sitting balance and Core/trunk control/strengthening  next sessions focus on continued OOB tolerance, basic transfers func mob to/from bathroom as able, light unsupported sitting tasks, light UE strengthening     OT Therapy Minutes   OT Time In 0930   OT Time Out 1030   OT Total Time (minutes) 60   OT Mode of treatment - Individual (minutes) 60   OT Mode of treatment - Concurrent (minutes) 0   OT Mode of treatment - Group (minutes) 0   OT Mode of treatment - Co-treat (minutes) 0   OT Mode of Treatment - Total time(minutes) 60 minutes   OT Cumulative Minutes 987

## 2022-12-23 LAB
ABO GROUP BLD: NORMAL
BASOPHILS # BLD AUTO: 0.05 THOUSANDS/ÂΜL (ref 0–0.1)
BASOPHILS NFR BLD AUTO: 0 % (ref 0–1)
BLD GP AB SCN SERPL QL: NEGATIVE
EOSINOPHIL # BLD AUTO: 0 THOUSAND/ÂΜL (ref 0–0.61)
EOSINOPHIL NFR BLD AUTO: 0 % (ref 0–6)
ERYTHROCYTE [DISTWIDTH] IN BLOOD BY AUTOMATED COUNT: 17.7 % (ref 11.6–15.1)
GLUCOSE SERPL-MCNC: 140 MG/DL (ref 65–140)
GLUCOSE SERPL-MCNC: 148 MG/DL (ref 65–140)
GLUCOSE SERPL-MCNC: 176 MG/DL (ref 65–140)
GLUCOSE SERPL-MCNC: 200 MG/DL (ref 65–140)
HCT VFR BLD AUTO: 24.6 % (ref 36.5–49.3)
HCT VFR BLD AUTO: 26.9 % (ref 36.5–49.3)
HGB BLD-MCNC: 7.6 G/DL (ref 12–17)
HGB BLD-MCNC: 8.3 G/DL (ref 12–17)
IMM GRANULOCYTES # BLD AUTO: 0.11 THOUSAND/UL (ref 0–0.2)
IMM GRANULOCYTES NFR BLD AUTO: 1 % (ref 0–2)
LYMPHOCYTES # BLD AUTO: 1.36 THOUSANDS/ÂΜL (ref 0.6–4.47)
LYMPHOCYTES NFR BLD AUTO: 10 % (ref 14–44)
MCH RBC QN AUTO: 27 PG (ref 26.8–34.3)
MCHC RBC AUTO-ENTMCNC: 30.9 G/DL (ref 31.4–37.4)
MCV RBC AUTO: 87 FL (ref 82–98)
MONOCYTES # BLD AUTO: 1.26 THOUSAND/ÂΜL (ref 0.17–1.22)
MONOCYTES NFR BLD AUTO: 10 % (ref 4–12)
NEUTROPHILS # BLD AUTO: 10.44 THOUSANDS/ÂΜL (ref 1.85–7.62)
NEUTS SEG NFR BLD AUTO: 79 % (ref 43–75)
NRBC BLD AUTO-RTO: 0 /100 WBCS
PLATELET # BLD AUTO: 309 THOUSANDS/UL (ref 149–390)
PMV BLD AUTO: 9.6 FL (ref 8.9–12.7)
RBC # BLD AUTO: 2.82 MILLION/UL (ref 3.88–5.62)
RH BLD: POSITIVE
SPECIMEN EXPIRATION DATE: NORMAL
WBC # BLD AUTO: 13.22 THOUSAND/UL (ref 4.31–10.16)

## 2022-12-23 RX ADMIN — OXYCODONE HYDROCHLORIDE 10 MG: 10 TABLET ORAL at 21:16

## 2022-12-23 RX ADMIN — OXYCODONE HYDROCHLORIDE 10 MG: 10 TABLET ORAL at 14:26

## 2022-12-23 RX ADMIN — INSULIN LISPRO 2 UNITS: 100 INJECTION, SOLUTION INTRAVENOUS; SUBCUTANEOUS at 16:58

## 2022-12-23 RX ADMIN — ACETAMINOPHEN 975 MG: 325 TABLET, FILM COATED ORAL at 21:04

## 2022-12-23 RX ADMIN — INSULIN GLARGINE 12 UNITS: 100 INJECTION, SOLUTION SUBCUTANEOUS at 21:05

## 2022-12-23 RX ADMIN — SIMETHICONE 80 MG: 80 TABLET, CHEWABLE ORAL at 08:08

## 2022-12-23 RX ADMIN — AMLODIPINE BESYLATE 5 MG: 5 TABLET ORAL at 08:08

## 2022-12-23 RX ADMIN — PANTOPRAZOLE SODIUM 40 MG: 40 TABLET, DELAYED RELEASE ORAL at 06:27

## 2022-12-23 RX ADMIN — PANTOPRAZOLE SODIUM 40 MG: 40 TABLET, DELAYED RELEASE ORAL at 16:55

## 2022-12-23 RX ADMIN — METHOCARBAMOL 500 MG: 500 TABLET ORAL at 17:00

## 2022-12-23 RX ADMIN — ASPIRIN 81 MG CHEWABLE TABLET 81 MG: 81 TABLET CHEWABLE at 08:08

## 2022-12-23 RX ADMIN — HEPARIN SODIUM 5000 UNITS: 5000 INJECTION INTRAVENOUS; SUBCUTANEOUS at 21:04

## 2022-12-23 RX ADMIN — HEPARIN SODIUM 5000 UNITS: 5000 INJECTION INTRAVENOUS; SUBCUTANEOUS at 06:27

## 2022-12-23 RX ADMIN — AMLODIPINE BESYLATE 5 MG: 5 TABLET ORAL at 17:00

## 2022-12-23 RX ADMIN — ATORVASTATIN CALCIUM 40 MG: 40 TABLET, FILM COATED ORAL at 08:08

## 2022-12-23 RX ADMIN — ACETAMINOPHEN 975 MG: 325 TABLET, FILM COATED ORAL at 06:27

## 2022-12-23 RX ADMIN — INSULIN LISPRO 6 UNITS: 100 INJECTION, SOLUTION INTRAVENOUS; SUBCUTANEOUS at 16:58

## 2022-12-23 RX ADMIN — HEPARIN SODIUM 5000 UNITS: 5000 INJECTION INTRAVENOUS; SUBCUTANEOUS at 14:26

## 2022-12-23 RX ADMIN — ERTAPENEM SODIUM 1000 MG: 1 INJECTION, POWDER, LYOPHILIZED, FOR SOLUTION INTRAMUSCULAR; INTRAVENOUS at 21:04

## 2022-12-23 RX ADMIN — SIMETHICONE 80 MG: 80 TABLET, CHEWABLE ORAL at 21:04

## 2022-12-23 RX ADMIN — ACETAMINOPHEN 975 MG: 325 TABLET, FILM COATED ORAL at 14:26

## 2022-12-23 RX ADMIN — METHOCARBAMOL 500 MG: 500 TABLET ORAL at 08:08

## 2022-12-23 RX ADMIN — TAMSULOSIN HYDROCHLORIDE 0.4 MG: 0.4 CAPSULE ORAL at 16:55

## 2022-12-23 RX ADMIN — SIMETHICONE 80 MG: 80 TABLET, CHEWABLE ORAL at 16:58

## 2022-12-23 RX ADMIN — INSULIN LISPRO 1 UNITS: 100 INJECTION, SOLUTION INTRAVENOUS; SUBCUTANEOUS at 06:40

## 2022-12-23 RX ADMIN — INSULIN LISPRO 6 UNITS: 100 INJECTION, SOLUTION INTRAVENOUS; SUBCUTANEOUS at 08:08

## 2022-12-23 RX ADMIN — MELATONIN TAB 3 MG 3 MG: 3 TAB at 21:05

## 2022-12-23 RX ADMIN — OXYCODONE HYDROCHLORIDE 5 MG: 5 TABLET ORAL at 08:59

## 2022-12-23 RX ADMIN — Medication 1 TABLET: at 08:08

## 2022-12-23 RX ADMIN — CHOLECALCIFEROL TAB 10 MCG (400 UNIT) 400 UNITS: 10 TAB at 08:08

## 2022-12-23 NOTE — QUICK NOTE
GI Quick Note   ------------------------    · Patient's alkaline phosphatase fractionation of alkaline phosphatase shows 48% liver, 52% bone  · He needs more imaging with bone scan and MRCP    I reached out to surgical oncology to discuss the plan with them because the patient has a good rapport with them and would cause him less anxiety if further planning comes from the surgical oncology team  · Surgical oncology resident will discuss the plan with Dr Jose G Buitrago  · If the surgical oncology team decides on further imaging we are happy to review it and see if any interventions are stable for the liver metastasis    We will follow peripherally

## 2022-12-23 NOTE — PLAN OF CARE
Problem: Prexisting or High Potential for Compromised Skin Integrity  Goal: Skin integrity is maintained or improved  Description: INTERVENTIONS:  - Identify patients at risk for skin breakdown  - Assess and monitor skin integrity  - Assess and monitor nutrition and hydration status  - Monitor labs   - Assess for incontinence   - Turn and reposition patient  - Assist with mobility/ambulation  - Relieve pressure over bony prominences  - Avoid friction and shearing  - Provide appropriate hygiene as needed including keeping skin clean and dry  - Evaluate need for skin moisturizer/barrier cream  - Collaborate with interdisciplinary team   - Patient/family teaching  - Consider wound care consult   Outcome: Progressing     Problem: Potential for Falls  Goal: Patient will remain free of falls  Description: INTERVENTIONS:  - Educate patient/family on patient safety including physical limitations  - Instruct patient to call for assistance with activity   - Consult OT/PT to assist with strengthening/mobility   - Keep Call bell within reach  - Keep bed low and locked with side rails adjusted as appropriate  - Keep care items and personal belongings within reach  - Initiate and maintain comfort rounds  - Make Fall Risk Sign visible to staff  - Offer Toileting every  Hours, in advance of need  - Initiate/Maintain alarm  - Obtain necessary fall risk management equipment:   - Apply yellow socks and bracelet for high fall risk patients  - Consider moving patient to room near nurses station  Outcome: Progressing     Problem: PAIN - ADULT  Goal: Verbalizes/displays adequate comfort level or baseline comfort level  Description: Interventions:  - Encourage patient to monitor pain and request assistance  - Assess pain using appropriate pain scale  - Administer analgesics based on type and severity of pain and evaluate response  - Implement non-pharmacological measures as appropriate and evaluate response  - Consider cultural and social influences on pain and pain management  - Notify physician/advanced practitioner if interventions unsuccessful or patient reports new pain  Outcome: Progressing     Problem: INFECTION - ADULT  Goal: Absence or prevention of progression during hospitalization  Description: INTERVENTIONS:  - Assess and monitor for signs and symptoms of infection  - Monitor lab/diagnostic results  - Monitor all insertion sites, i e  indwelling lines, tubes, and drains  - Monitor endotracheal if appropriate and nasal secretions for changes in amount and color  - [place] appropriate cooling/warming therapies per order  - Administer medications as ordered  - Instruct and encourage patient and family to use good hand hygiene technique  - Identify and instruct in appropriate isolation precautions for identified infection/condition  Outcome: Progressing     Problem: SAFETY ADULT  Goal: Patient will remain free of falls  Description: INTERVENTIONS:  - Educate patient/family on patient safety including physical limitations  - Instruct patient to call for assistance with activity   - Consult OT/PT to assist with strengthening/mobility   - Keep Call bell within reach  - Keep bed low and locked with side rails adjusted as appropriate  - Keep care items and personal belongings within reach  - Initiate and maintain comfort rounds  - Make Fall Risk Sign visible to staff  - Offer Toileting every  Hours, in advance of need  - Initiate/Maintain alarm  - Obtain necessary fall risk management equipment:   - Apply yellow socks and bracelet for high fall risk patients  - Consider moving patient to room near nurses station  Outcome: Progressing  Goal: Maintain or return to baseline ADL function  Description: INTERVENTIONS:  -  Assess patient's ability to carry out ADLs; assess patient's baseline for ADL function and identify physical deficits which impact ability to perform ADLs (bathing, care of mouth/teeth, toileting, grooming, dressing, etc )  - Assess/evaluate cause of self-care deficits   - Assess range of motion  - Assess patient's mobility; develop plan if impaired  - Assess patient's need for assistive devices and provide as appropriate  - Encourage maximum independence but intervene and supervise when necessary  - Involve family in performance of ADLs  - Assess for home care needs following discharge   - Consider OT consult to assist with ADL evaluation and planning for discharge  - Provide patient education as appropriate  Outcome: Progressing  Goal: Maintains/Returns to pre admission functional level  Description: INTERVENTIONS:  - Perform BMAT or MOVE assessment daily    - Set and communicate daily mobility goal to care team and patient/family/caregiver  - Collaborate with rehabilitation services on mobility goals if consulted  - Perform Range of Motion  times a day  - Reposition patient every  hours    - Dangle patient  times a day  - Stand patient times a day  - Ambulate patient  times a day  - Out of bed to chair  times a day   - Out of bed for meals  times a day  - Out of bed for toileting  - Record patient progress and toleration of activity level   Outcome: Progressing     Problem: DISCHARGE PLANNING  Goal: Discharge to home or other facility with appropriate resources  Description: INTERVENTIONS:  - Identify barriers to discharge w/patient and caregiver  - Arrange for needed discharge resources and transportation as appropriate  - Identify discharge learning needs (meds, wound care, etc )  - Arrange for interpretive services to assist at discharge as needed  - Refer to Case Management Department for coordinating discharge planning if the patient needs post-hospital services based on physician/advanced practitioner order or complex needs related to functional status, cognitive ability, or social support system  Outcome: Progressing     Problem: Nutrition/Hydration-ADULT  Goal: Nutrient/Hydration intake appropriate for improving, restoring or maintaining nutritional needs  Description: Monitor and assess patient's nutrition/hydration status for malnutrition  Collaborate with interdisciplinary team and initiate plan and interventions as ordered  Monitor patient's weight and dietary intake as ordered or per policy  Utilize nutrition screening tool and intervene as necessary  Determine patient's food preferences and provide high-protein, high-caloric foods as appropriate       INTERVENTIONS:  - Monitor oral intake, urinary output, labs, and treatment plans  - Assess nutrition and hydration status and recommend course of action  - Evaluate amount of meals eaten  - Assist patient with eating if necessary   - Allow adequate time for meals  - Recommend/ encourage appropriate diets, oral nutritional supplements, and vitamin/mineral supplements  - Order, calculate, and assess calorie counts as needed  - Recommend, monitor, and adjust tube feedings and TPN/PPN based on assessed needs  - Assess need for intravenous fluids  - Provide specific nutrition/hydration education as appropriate  - Include patient/family/caregiver in decisions related to nutrition  Outcome: Progressing     Problem: MOBILITY - ADULT  Goal: Maintain or return to baseline ADL function  Description: INTERVENTIONS:  -  Assess patient's ability to carry out ADLs; assess patient's baseline for ADL function and identify physical deficits which impact ability to perform ADLs (bathing, care of mouth/teeth, toileting, grooming, dressing, etc )  - Assess/evaluate cause of self-care deficits   - Assess range of motion  - Assess patient's mobility; develop plan if impaired  - Assess patient's need for assistive devices and provide as appropriate  - Encourage maximum independence but intervene and supervise when necessary  - Involve family in performance of ADLs  - Assess for home care needs following discharge   - Consider OT consult to assist with ADL evaluation and planning for discharge  - Provide patient education as appropriate  Outcome: Progressing  Goal: Maintains/Returns to pre admission functional level  Description: INTERVENTIONS:  - Perform BMAT or MOVE assessment daily    - Set and communicate daily mobility goal to care team and patient/family/caregiver  - Collaborate with rehabilitation services on mobility goals if consulted  - Perform Range of Motion  times a day  - Reposition patient every  hours    - Dangle patient  times a day  - Stand patient  times a day  - Ambulate patient  times a day  - Out of bed to chair  times a day   - Out of bed for meals  times a day  - Out of bed for toileting  - Record patient progress and toleration of activity level   Outcome: Progressing

## 2022-12-23 NOTE — CASE MANAGEMENT
Received email confirmation from claudia at [facility] with approved days, lcd and update due 12/28

## 2022-12-23 NOTE — PROGRESS NOTES
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  • s/p hepatic resection and reversal of colostomy on 11/7  • Return to the OR 11/16 for right hemicolectomy with partial descending colectomy colocolonic anastomosis with loop ileostomy and mid line abdominal VAC placement  • Continue local wound care; WC following  • D/w Shaniqua Joe from [facility]  = if ileostomy is >2L for 2 days in a row then to give IVF  • CT  Abd/pelvis 12/17 = loculated collection along splenic recess   Has perisplenic hilum collection as well --> to IR 12/20 for drain placement in both perigastric and perisplenic area and cx sent from both areas = Ecoli  • Flush drains with 10ml qd; tube check 2 weeks 1/3/23  • Surgery following and monitoring drain output      Acute cholecystitis  • s/p percutaneous tube placement 12/8 with tube check on 12/12  • Currently draining bloody discharge  • IR evaluated 12/14 states perc radha tube drainage may remain bloody for several days  • No further abdominal pain  • GI following due to LFT elevation = felt alkaline phosphatase elevated 2/2 infiltrative disease rather than focal biliary obstruction   AMA was negative     • Alkaline phosphatase isoenzyme sent 12/17 = GI aware that has resulted  They recommended continuing to monitor his LFTs  • TB 1 3 - 1 6 - 1 3  Monitor LFTs every other day  Consider MRCP if total bili trends back up      Bacteremia/abdominal abscess  • ID now following due to worsening leukocytosis and restarted Ertapenem    • Blood cultures negative  • CT abdomen and pelvis as above  • Tmax 100 3 12/22/22   Continue to monitor closely      Hypertension  • On Norvasc 5mg BID  • stable     Diabetes mellitus  • On Lantus 12U qhs/Lispro 6U TID  • Continue diabetic diet and QID Accuchecks with SSI  • No changes today      Acute on chronic renal failure  • Stage III; baseline 1 4  • Lisinopril currently on hold  • Stable     Anemia  • Multifactorial due to recent infection, surgery, CKD stage III  • Transfused on 12/15 for hemoglobin 7 3 and 12/16/22 for hgb of 8   • Hgb currently 7 6  H&H to be repeated  Consider transfusion      Atypical chest pain  • With elevation in troponin/EKG changes  • Cardiology cleared pt for discharge  • Did not recommend any further work up  • On 12/15 had CP = Cards saw and felt was M/S     Situational depression  • Neuropsychology consulted  • Patient not interested in medications at this point in time     Malnutrition  • Recommend dietician consultation to optimize wound healing  • Glucerna makes him nauseated  • Breads etc make him too full  • On 12/15, ordered double protein     Pleural effusion  • CXR on 12/14 showed small left pleural effusion and was suspect for CHF  • ECHO 11/9/22 = LVEF 55%, unable to assess diastolic function, mild TR  • No SOB and sats are stable on RA  • Will watch for now        Discharge date:  Team       The above assessment and plan was reviewed and updated as determined by my evaluation of the patient on 12/23/2022      Labs:   Results from last 7 days   Lab Units 12/23/22  0637 12/22/22  0552   WBC Thousand/uL 13 22* 14 80*   HEMOGLOBIN g/dL 7 6* 8 4*   HEMATOCRIT % 24 6* 27 9*   PLATELETS Thousands/uL 309 366     Results from last 7 days   Lab Units 12/22/22  0552 12/21/22  0748   SODIUM mmol/L 140 138   POTASSIUM mmol/L 4 0 4 0   CHLORIDE mmol/L 110* 108   CO2 mmol/L 23 23   BUN mg/dL 17 18   CREATININE mg/dL 1 22 1 28   CALCIUM mg/dL 9 0 8 7         Results from last 7 days   Lab Units 12/19/22  0841   INR  1 12     Results from last 7 days   Lab Units 12/23/22  0634 12/22/22  2045 12/22/22  1551   POC GLUCOSE mg/dl 176* 191* 147*       Review of Scheduled Meds:  Current Facility-Administered Medications Medication Dose Route Frequency Provider Last Rate   • acetaminophen  975 mg Oral Q8H [facility] GAURANG Keith     • amLODIPine  5 mg Oral BID GAURANG Keith     • aspirin  81 mg Oral Daily GAURANG Keith     • atorvastatin  40 mg Oral Daily GAURANG Keith     • calcium carbonate  500 mg Oral BID PRN GAURANG Peterson     • cholecalciferol  400 Units Oral Daily GAURANG Keith     • ertapenem  1,000 mg Intravenous Q24H Jose Elias Marie MD Stopped (12/22/22 2100)   • heparin (porcine)  5,000 Units Subcutaneous Q8H [facility] GAURANG Keith     • insulin glargine  12 Units Subcutaneous HS GAURNAG Sales     • insulin lispro  1-5 Units Subcutaneous HS GAURANG Keith     • insulin lispro  1-6 Units Subcutaneous TID AC GAURANG Keith     • insulin lispro  6 Units Subcutaneous TID With Meals GAURANG Sales     • iohexol  50 mL Oral 90 min pre-procedure Jose Elias Marie MD     • melatonin  3 mg Oral HS GAURANG Keith     • methocarbamol  500 mg Oral BID GAURANG Peterson     • multivitamin-minerals  1 tablet Oral Daily GAURANG Keith     • ondansetron  4 mg Oral Q6H PRN GAURANG Peterson     • oxyCODONE  10 mg Oral Q6H PRN GAURANG Keith     • oxyCODONE  2 5 mg Oral Q3H PRN GAURANG Peterson     • oxyCODONE  5 mg Oral Q6H PRN GAURANG Keith     • pantoprazole  40 mg Oral BID AC GAURANG Keith     • simethicone  80 mg Oral 4x Daily (with meals and at bedtime) GAURANG Peterson     • tamsulosin  0 4 mg Oral Daily With Dinner GAURANG Peterson         Subjective/ HPI: Patient seen and examined  Patients overnight issues or events were reviewed with nursing or staff during rounds or morning huddle session  New or overnight issues include the following:     Pt seen in his room  He states that he has not had any further chills  He reports abdominal pain which is stable  He denies any other complaints      ROS:   A 10 point ROS was performed; negative except as noted above  *Labs /Radiology studies reviewed  *Medications reviewed and reconciled as needed  *Please refer to order section for additional ordered labs studies  *Case discussed with primary attending during morning huddle case rounds    Physical Examination:  Vitals:   Vitals:    12/22/22 2148 12/22/22 2255 12/23/22 0241 12/23/22 0559   BP: 153/53 132/77 135/81 138/80   BP Location: Right arm Right arm Right arm Left arm   Pulse: (!) 117 99 81 94   Resp: 20 19 17 16   Temp: 100 3 °F (37 9 °C) 98 5 °F (36 9 °C) 97 6 °F (36 4 °C) 98 1 °F (36 7 °C)   TempSrc: Oral Oral Oral Oral   SpO2: 91% 93% 96% 94%   Weight:       Height:           General Appearance: no distress, conversive  HEENT:  External ear normal   Nose normal w/o drainage  Mucous membranes are moist  Oropharynx is clear  Conjunctiva clear w/o icterus or redness  Neck:  Supple, normal ROM  Lungs: BBS without crackles/wheeze/rhonchi; respirations unlabored with normal inspiratory/expiratory effort  No retractions noted  On RA  CV: regular rate and rhythm; no rubs/murmurs/gallops, PMI normal   ABD: Abdomen is soft  Bowel sounds all quadrants  Nontender with no distention  Drains intact  EXT: no edema  Skin: normal turgor, normal texture, no rashes  Psych: affect normal, mood normal  Neuro: AAO      The above physical exam was reviewed and updated as determined by my evaluation of the patient on 12/23/2022  Invasive Devices     Central Venous Catheter Line  Duration           Port A Cath 03/10/22 Right Chest 287 days          Drain  Duration           Ileostomy LUQ 35 days    Abscess Drain Abdomen 14 days    Cholecystostomy Tube 10 days    Abscess Drain Abdomen 2 days    Abscess Drain Back 2 days                   VTE Pharmacologic Prophylaxis: Heparin  Code Status: Level 1 - Full Code  Current Length of Stay: 9 day(s)      Total time spent:  30 minutes with more than 50% spent counseling/coordinating care   Counseling includes discussion with patient re: progress  and discussion with patient of his/her current medical state/information  Coordination of patient's care was performed in conjunction with primary service  Time invested included review of patient's labs, vitals, and management of their comorbidities with continued monitoring  In addition, this patient was discussed with medical team including physician and advanced extenders  The care of the patient was extensively discussed and appropriate treatment plan was formulated unique for this patient  Medical decision making for the day was made by supervising physician unless otherwise noted in their attestation statement  ** Please Note:  voice to text software may have been used in the creation of this document   Although proof errors in transcription or interpretation are a potential of such software**

## 2022-12-23 NOTE — PROGRESS NOTES
12/23/22 0830   Pain Assessment   Pain Assessment Tool 0-10   Pain Score 5   Pain Location/Orientation Orientation: Left; Location: Abdomen; Location: Back   Hospital Pain Intervention(s) Repositioned; Emotional support   Restrictions/Precautions   Precautions Fall Risk;Contact/isolation;Multiple lines;Supervision on toilet/commode  (VICTORINO drain, ileostomy, IR Drain)   Weight Bearing Restrictions No   ROM Restrictions No   Oral Hygiene   Type of Assistance Needed Independent   Physical Assistance Level No physical assistance   Comment seated in w/c at sink   Oral Hygiene CARE Score 6   Sit to Lying   Type of Assistance Needed Physical assistance   Physical Assistance Level 51%-75%   Comment A for trunk and BLEs   Sit to Lying CARE Score 2   Lying to Sitting on Side of Bed   Type of Assistance Needed Physical assistance   Physical Assistance Level 51%-75%   Lying to Sitting on Side of Bed CARE Score 2   Sit to Stand   Type of Assistance Needed Physical assistance   Physical Assistance Level 26%-50%   Comment Max cues for technique  Sit to Stand CARE Score 3   Bed-Chair Transfer   Type of Assistance Needed Physical assistance   Physical Assistance Level 26%-50%   Comment Pending surface height   Chair/Bed-to-Chair Transfer CARE Score 3   Toileting Hygiene   Type of Assistance Needed Physical assistance   Physical Assistance Level 51%-75%   Comment For CM and bladder hygiene while in stance  Toileting Hygiene CARE Score 2   Cognition   Overall Cognitive Status WFL   Arousal/Participation Cooperative   Attention Attends with cues to redirect   Orientation Level Oriented X4   Memory Within functional limits   Following Commands Follows one step commands with increased time or repetition   Assessment   Treatment Assessment Brief session focusing on bed mobility, toileting, grooming, and funcitonal txfers  Pt continues to require emotional support throughout session   Pt remains limited by pain, dec endurance, dec act julia, dec strength  Pt will continue to benefit from skilled OT services following POC with focus on above mentioned deficits to inc safety and independence with I/ADL tasks  Prognosis Fair   Problem List Decreased strength;Decreased range of motion;Decreased endurance; Impaired balance;Decreased mobility;Pain   Plan   Treatment/Interventions ADL retraining;Functional transfer training; Therapeutic exercise; Endurance training;Cognitive reorientation;Patient/family training;Equipment eval/education; Bed mobility; Compensatory technique education   Progress Slow progress, decreased activity tolerance   Recommendation   OT Discharge Recommendation   (pending progress)   OT Therapy Minutes   OT Time In 0830   OT Time Out 0915   OT Total Time (minutes) 45   OT Mode of treatment - Individual (minutes) 45   OT Mode of treatment - Concurrent (minutes) 0   OT Mode of treatment - Group (minutes) 0   OT Mode of treatment - Co-treat (minutes) 0   OT Mode of Treatment - Total time(minutes) 45 minutes   OT Cumulative Minutes 570   Therapy Time missed   Time missed?  No

## 2022-12-23 NOTE — PROGRESS NOTES
Progress Note - Infectious Disease   Mitesh Mercer 61 y o  male MRN: [id number]  Unit/Bed#: -01 Encounter: [id number]      Impression/Recommendations:  Sepsis     Evolving 12/17:  WBC, HR   Suspect due to persistent left intra-abdominal infection in setting of complex history below, recently completed antibiotics   ROS and exam otherwise negative   Recent Flu/RSV/COVID PCR, CXR negative   Blood cultures negative   Improved with reinitiation of antibiotics, additional drain placement  Rec:  • Continue ertapenem for now through the weekend  • Drains as below  • Follow temperatures and WBC closely  • Supportive care as per the primary service     Intra-abdominal abscess     In the setting complex surgical history as below   Initially developed 11/2022 companied by ESBL E  coli bacteremia   Status post exploratory laparotomy, right hemicolectomy, temporary abdominal closure 11/16; re-exploration, partial left colectomy, primary ileocolonic anastomosis with proximal diverting loop ileostomy, midline fascial closure on 11/17   More recently developed abscess in hepatectomy bed and collection +/- leak at area of prior colonic anastamosis, 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   Status post IR drain into perihepatic collection 12/8   Cultures with ESBL E  Coli   Status post 7 days ertapenem after drain placement through 12/15   Repeat CT 12/17 shows hepatic bed collection improved, persistent left intra-abdominal collection   Status post perigastic, perisplenic drains 12/20    Cultures again with ESBL E  coli  Rec:  • Continue antibiotics as above  • Drains per IR  • If continues to improve anticipate change to PO doxycycline iearly next week     Possible acute cholecystitis     Status post percutaneous cholecystostomy tube   Alk phos remains markedly elevated, possibly due to drain itself versus known intrahepatic metastases      Metastatic colon cancer   To liver, possible lung   Status post resection, chemotherapy, more recent hepatic resection and ablation, transverse colon resection      CKD   Baseline Cr 1 4     DM      Anemia   Status post multiple transfusions      The above impression and plan was discussed in detail with the patient  The patient is stable from an ID standpoint  I will reassess the patient on   Please call in the interim with new questions      Antibiotics:  Ertapenem #7    Subjective:  Patient seen on AM rounds  Denies fevers, chills, sweats, nausea, vomiting, or diarrhea  Ambulated to and from bathroom with walker  24 Hour Events:  No documented fevers, chills, sweats, nausea, vomiting, or diarrhea  Objective:  Vitals:  Temp:  [97 6 °F (36 4 °C)-100 3 °F (37 9 °C)] 98 1 °F (36 7 °C)  HR:  [] 94  Resp:  [16-20] 16  BP: (132-153)/(53-83) 138/80  SpO2:  [91 %-96 %] 94 %  Temp (24hrs), Av 7 °F (37 1 °C), Min:97 6 °F (36 4 °C), Max:100 3 °F (37 9 °C)  Current: Temperature: 98 1 °F (36 7 °C)    Physical Exam:   General:  No acute distress  Psychiatric:  Awake and alert  Pulmonary:  Normal respiratory excursion without accessory muscle use  Abdomen:  Soft, nontender  Extremities:  No edema  Skin:  No rashes    Lab Results:  I have personally reviewed pertinent labs    Results from last 7 days   Lab Units 22  0552 22  0748 22  0816 22  0536   POTASSIUM mmol/L 4 0 4 0  --  3 5   CHLORIDE mmol/L 110* 108  --  105   CO2 mmol/L 23 23  --  24   BUN mg/dL 17 18  --  16   CREATININE mg/dL 1 22 1 28  --  1 19   EGFR ml/min/1 73sq m 64 61  --  66   CALCIUM mg/dL 9 0 8 7  --  8 4   AST U/L 56* 57* 58* 61*   ALT U/L 17 17 17 19   ALK PHOS U/L 1,096* 1,093* 1,025* 989*     Results from last 7 days   Lab Units 22  0637 22  0552 22  0724   WBC Thousand/uL 13 22* 14 80* 13 62*   HEMOGLOBIN g/dL 7 6* 8 4* 8 7*   PLATELETS Thousands/uL 309 366 376     Results from last 7 days   Lab Units 22  1944 22  1906 22  1312 12/17/22  1234   BLOOD CULTURE   --   --  No Growth After 5 Days  No Growth After 5 Days  GRAM STAIN RESULT  2+ Polys  No bacteria seen 1+ Gram negative rods*  1+ Gram positive rods*  2+ Polys*  --   --    BODY FLUID CULTURE, STERILE  2+ Growth of Escherichia coli ESBL* 2+ Growth of Escherichia coli ESBL*  2+ Growth of Enterococcus faecalis*  --   --        Imaging Studies:   I have personally reviewed pertinent imaging study reports and images in PACS  EKG, Pathology, and Other Studies:   I have personally reviewed pertinent reports

## 2022-12-23 NOTE — PROGRESS NOTES
Progress Note - Surgical Oncology  Kasey Dunn 61 y o  male MRN: [id number]  Unit/Bed#: -09 Encounter: [id number]    Assessment:  61 y o  M with PMHx of metastatic colon cancer s/p exploratory laparotomy, hepatic segment 3 resection, transverse colectomy and reversal of loop colostomy on 50/3, complicated by an anastomotic leak requiring re-exploration, right hemicolectomy 11/16 and subsequent primary ileocolonic anastomosis with diverting loop ileostomy 11/17  Patient was readmitted for acute cholecystitis s/p percutaneous cholecystostomy tube placement on 12/8 and now s/p IR drainage of intra-abdominal fluid collections on 12/20    Tmax 100 3  VSS  On room air   WBC pending for today    Perc radha tube: 30 cc blood tinged bilious drainage  LUQ VICTORINO drains x 3 with 0,10,0 cc thick purulent drainage   Ileostomy 250 cc formed stool and gas  Plan:  Diet as tolerated  Maintain IR drains  Continue Abx per ID - Ertapenem  Maintain perc radha tube  Consider repeat CT scan in setting of persistent leukocytosis and elevated temps   Continue ileostomy care and teaching  Prn analgesia  PT/OT  DVT ppx   Remainder of care per primary team    Subjective/Objective     Subjective: Patient feels well this morning  He had an episodes around 11 pm of chills with a T max of 100 3  He then slept the rest of the night and states his abdominal pain is improving  He is tolerating a diet without nausea or vomiting  Objective:     Blood pressure 138/80, pulse 94, temperature 98 1 °F (36 7 °C), temperature source Oral, resp  rate 16, height 5' 10" (1 778 m), weight 104 kg (230 lb), SpO2 94 %  ,Body mass index is 33 kg/m²        Intake/Output Summary (Last 24 hours) at 12/23/2022 6122  Last data filed at 12/23/2022 0600  Gross per 24 hour   Intake 20 ml   Output 1058 ml   Net -1038 ml       Invasive Devices     Central Venous Catheter Line  Duration           Port A Cath 03/10/22 Right Chest 287 days          Drain  Duration Ileostomy LUQ 35 days    Abscess Drain Abdomen 14 days    Cholecystostomy Tube 10 days    Abscess Drain Abdomen 2 days    Abscess Drain Back 2 days                Physical Exam:   NAD, alert and oriented x3  Normocephalic, atraumatic  Moist mucous membranes   Norm resp effort on room air   Regular rate  Abd soft, nondistended, mild LUQ tenderness  LUQ VICTORINO drains x 3 with brown/blood tinged thick purulent fluid  Ileostomy is viable with formed stool and gas in bag   Perc radha tube in place with blood tinged bilious drainage  No calf tenderness or peripheral edema  CN grossly intact   Skin is warm and dry        Lab, Imaging and other studies:  CBC: No results found for: WBC, HGB, HCT, MCV, PLT, ADJUSTEDWBC, MCH, MCHC, RDW, MPV, NRBC, CMP:   No results found for: SODIUM, K, CL, CO2, ANIONGAP, BUN, CREATININE, GLUCOSE, CALCIUM, AST, ALT, ALKPHOS, PROT, BILITOT, EGFR  VTE Pharmacologic Prophylaxis: Heparin  VTE Mechanical Prophylaxis: sequential compression device

## 2022-12-23 NOTE — PROGRESS NOTES
12/23/22 1030   Pain Assessment   Pain Assessment Tool 0-10   Pain Score 5   Pain Location/Orientation Orientation: Left; Location: Back; Location: Abdomen   Hospital Pain Intervention(s) Repositioned; Emotional support   Restrictions/Precautions   Precautions Fall Risk;Contact/isolation;Multiple lines;Supervision on toilet/commode  (VICTORINO drain, ileostomy, IR drain)   Weight Bearing Restrictions No   ROM Restrictions No   Sit to Stand   Type of Assistance Needed Physical assistance   Physical Assistance Level 26%-50%   Comment min/modA pending levels of fatigue and surface height  Sit to Stand CARE Score 3   Bed-Chair Transfer   Type of Assistance Needed Physical assistance   Physical Assistance Level 26%-50%   Comment min/modA SPT with RW  Chair/Bed-to-Chair Transfer CARE Score 3   Cognition   Overall Cognitive Status WFL   Arousal/Participation Cooperative   Attention Attends with cues to redirect   Orientation Level Oriented X4   Memory Within functional limits   Following Commands Follows one step commands with increased time or repetition   Additional Activities   Additional Activities Comments Pt engaged in unsupported sitting in w/c to engage in Wii bowling to promote act julia, strength, and leisure pursuits  Pt was able to tolerate activity for 20 minutes at an overall CS level while seated  Activity Tolerance   Activity Tolerance Patient limited by fatigue   Assessment   Treatment Assessment Pt participated in skilled OT services with focus on act julia, endurance, sitting balance and functional txfers  Pt continues to be limited by pain, anxiousness, dec act julia, dec endurance, dec strength  Pt will continue to benefit from skilled OT services with focus on above mentioned deficits to inc safety and independence with I/ADL tasks  Prognosis Fair   Problem List Decreased strength;Decreased range of motion;Decreased endurance; Impaired balance;Decreased mobility;Pain   Plan   Treatment/Interventions Functional transfer training;ADL retraining; Therapeutic exercise; Endurance training;Cognitive reorientation;Patient/family training;Equipment eval/education; Bed mobility; Compensatory technique education   Progress Slow progress, decreased activity tolerance   Recommendation   OT Discharge Recommendation   (pending progress)   OT Therapy Minutes   OT Time In 1030   OT Time Out 1130   OT Total Time (minutes) 60   OT Mode of treatment - Individual (minutes) 60   OT Mode of treatment - Concurrent (minutes) 0   OT Mode of treatment - Group (minutes) 0   OT Mode of treatment - Co-treat (minutes) 0   OT Mode of Treatment - Total time(minutes) 60 minutes   OT Cumulative Minutes 630   Therapy Time missed   Time missed?  No

## 2022-12-23 NOTE — PROGRESS NOTES
12/23/22 1400   Pain Assessment   Pain Assessment Tool 0-10   Pain Score 8   Pain Location/Orientation Orientation: Lower; Location: Back; Location: Abdomen   Pain Onset/Description Onset: Ongoing;Frequency: Constant/Continuous   Effect of Pain on Daily Activities limits act   Patient's Stated Pain Goal No pain   Hospital Pain Intervention(s) Emotional support; Rest   Restrictions/Precautions   Precautions Fall Risk;Contact/isolation;Pain;Supervision on toilet/commode;Multiple lines  (Dillan drain, ileostomy, IR Drain)   Cognition   Overall Cognitive Status WFL   Arousal/Participation Cooperative   Attention Attends with cues to redirect   Orientation Level Oriented X4   Memory Within functional limits   Following Commands Follows one step commands with increased time or repetition   Sit to Lying   Type of Assistance Needed Physical assistance;Verbal cues; Adaptive equipment   Physical Assistance Level 76% or more   Comment pt required [address] for trunk and BLE this session  Sit to Lying CARE Score 2   Lying to Sitting on Side of Bed   Type of Assistance Needed Physical assistance;Verbal cues; Adaptive equipment   Physical Assistance Level 26%-50%   Comment with max inflate on air mattress and use of bed rail   Lying to Sitting on Side of Bed CARE Score 3   Sit to Stand   Type of Assistance Needed Physical assistance;Verbal cues; Adaptive equipment   Physical Assistance Level 26%-50%   Comment stood with use of RW   Sit to Stand CARE Score 3   Bed-Chair Transfer   Comment unable to complete this session   Transfer Bed/Chair/Wheelchair   Adaptive Equipment Roller Walker   Car Transfer   Reason if not Attempted Safety concerns   Car Transfer CARE Score 88   Walk 10 Feet   Reason if not Attempted Safety concerns   Walk 10 Feet CARE Score 88   Walk 50 Feet with Two Turns   Reason if not Attempted Medical concerns   Walk 50 Feet with Two Turns CARE Score 88   Walk 150 Feet   Reason if not Attempted Medical concerns   Walk 150 Feet CARE Score 88   Walking 10 Feet on Uneven Surfaces   Reason if not Attempted Safety concerns   Walking 10 Feet on Uneven Surfaces CARE Score 88   Ambulation   Primary Mode of Locomotion Prior to Admission Walk   Findings (S)  unable to toelrate this session, please trial if able next session   Does the patient walk? 2  Yes   Wheel 50 Feet with Two Turns   Reason if not Attempted Activity not applicable   Wheel 50 Feet with Two Turns CARE Score 9   Wheel 150 Feet   Reason if not Attempted Activity not applicable   Wheel 056 Feet CARE Score 9   Wheelchair mobility   Does the patient use a wheelchair? 0  No   Type of Wheelchair Used 1  Manual   Curb or Single Stair   Reason if not Attempted Safety concerns   1 Step (Curb) CARE Score 88   4 Steps   Reason if not Attempted Safety concerns   4 Steps CARE Score 88   12 Steps   Reason if not Attempted Safety concerns   12 Steps CARE Score 88   Picking Up Object   Reason if not Attempted Safety concerns   Picking Up Object CARE Score 88   Toilet Transfer   Comment sitting EOB pt utilized urinal   Therapeutic Interventions   Strengthening supine active hip ABD, heel slides, quad sets, glute sets and ankle DF/PF 3 x10 reps BLE  Flexibility self hams, calf stretch supine with quad sets  Other Pt seated EOB x5 mins, pt stood to transfer over to recliner but felt his ileostomy bag was full and sat down immediatly  Pt felt the bag might burst if we attempted to transfer  Pt layed down and nursing was called to A with boost/repositioning  Pt's bag emptied and at this time pt was too exhusted to trial sitting up again  Pt c/o chills and nursing made aware, temp 100 3 when taken by PCA  Assessment   Treatment Assessment Pt participated in 60 min session with increased focus on bed mobility, increased act tolerance, increased LE strengthening and relaxation techniques  Pt noted increased anxiety this session with drains and ileostomy bag   Pt given VC's for purse lip breathing and relaxation techniques to slow breathing  Once pt supine pt able to close eyes work on slow breaths and did relax  Resting HR supine in bed was 127 after sitting EOB  Pt did c/o 8/10 pain and mid session his nurse was able to provide pain medication  Pt cont to have increased difficulty with participating at end of day as he feels he exhausts all energy in morning sessions  Pt will cont to benefit from increased act tolerance, increased LE strengthening, increased functional transfers and bed mobility to decrease burden of care  Problem List Decreased strength;Decreased range of motion;Decreased endurance; Impaired balance;Decreased mobility;Pain   Barriers to Discharge Inaccessible home environment;Decreased caregiver support   PT Barriers   Functional Limitation Car transfers; Ramp negotiation;Stair negotiation;Standing;Transfers; Walking; Wheelchair management   Plan   Treatment/Interventions Functional transfer training;LE strengthening/ROM; Therapeutic exercise; Endurance training;Patient/family training;Bed mobility;Gait training   Progress Slow progress, decreased activity tolerance   Recommendation   PT Discharge Recommendation Home with home health rehabilitation   [address]; Wheelchair   PT Therapy Minutes   PT Time In 1400   PT Time Out 1500   PT Total Time (minutes) 60   PT Mode of treatment - Individual (minutes) 60   PT Mode of treatment - Concurrent (minutes) 0   PT Mode of treatment - Group (minutes) 0   PT Mode of treatment - Co-treat (minutes) 0   PT Mode of Treatment - Total time(minutes) 60 minutes   PT Cumulative Minutes 520   Therapy Time missed   Time missed?  No

## 2022-12-23 NOTE — PROGRESS NOTES
PM&R PROGRESS NOTE:  Alexander Hisnon 61 y o  male MRN: [id number]  Unit/Bed#: -05 Encounter: [id number]        Rehabilitation Diagnosis: Impairment of mobility, safety and Activities of Daily Living (ADLs) due to Neurologic Conditions:  03 8  Neuromuscular Disorders  Etiology: Critical Illness Myopathy    HPI: Jm Menard is a 61 y o  male with a medical history of HTN, HLD, GERD, and ventral hernia that presented to [facility] on 11/7 for an elective surgical procedure due to metastatic colon adenocarcinoma by Dr Gabrielle Darnell  [facility] showed transverse colonic apple core lesion and inumberable hepatic metastases  MRI revealed multiple hepatic metastasis with smaller lesions in left lobe and larger lesions in right lobe  Patient completed chemotherapy, colostomy at that time  On 11/7 patient underwent exploratory laparotomy, segment 3 liver resection, ablation, intraoperative ultrasound, and colostomy reversal  This was c/b left upper quadrant hematoma, imaging showed suspected leak from transverse colon anastomosis  On 11/16, patient underwent exp  Lap which revealed LUQ infected hematoma, defect in transverse colon anastomosis with extravasation of succus  Massively dilated cecum requiring resection  He had a right hemicolectomy, left in discontinuity and temporary abdominal closure  On 11/17, re-exploration, partial descending colectomy, primary colonic anastomosis created with diverting loop ileostomy and midline wound VAC placement  He completed course of IV abx for ESBL bacteremia from abdominal abscess  Midline abdominal incision wet to dry dressings and packing to old stoma site  12/6 abdominal incision noted to have yellow drainage  CT abdomen showed abdominal abscess and distended gallbladder  Patient underwent percutaneous cholecystostomy tube insertion and hepatectomy abscess drainage  ID consulted, IV abx for 1 week   Nephrology consulted d/t ALEXY, creatinine baseline 1 2-1 4  On 12/14, patient experienced chest pain with movement  Cardiology evaluated; troponin level 57; chest pain appeared musculoskeletal with no further work up  Patient deemed medically stable and admitted to [facility] on 12/14/2022  SUBJECTIVE: Patient seen face to face  No acute issues overnight  Did have increased pain early in the evening, controlled with prn medications  Patient remains positive and ready to participate in therapy  No CP, SOB, fever, chills, N/V/D, abdominal pain  ASSESSMENT: Stable, progressing    PLAN:  1  Pain- continue to monitor and encourage pain medications to prevent intense pain episodes  2  WBC 13 22 (previous 14 80), Hbg 7 6 (previous 8 4) ordered type and cross and repeat H&H, 8 3  continue to monitor CBC  3  Drainage (24 hr): cholecystostomy tube 20, abscess drain #1 18 mL, abscess drain abdomen #2 30 mL, abscess drain back #3 20 mL  Continue to monitor  Rehabilitation  • Functional barriers:  Endurance, pain  • Continue current rehabilitation plan of care to maximize function      • Functional update:   • PT: mobility- mod A, transfer- mod A, ambulation- mod A  • OT: ADL- bathing mod A, dressing- UB mod A, LB max A, toileting- total assist  • Estimated Discharge: ADD 3 weeks    Pain  • Tylenol, oxycodone    DVT prophylaxis  • Heparin sc    Bladder plan  • Continent, urinal    Bowel plan  • ileostomy    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 liver [facility])  Assessment & Plan  · MRI-multiple hepatic metastasis; smaller lesions in left lobe, larger lesions in right lobe  · 11/7 Exp Lap, resection of hepatic segment 3, resection of transverse colon with reversal of loop colostomy (Dr Jose G Buitrago)  · 11/16- right hemicolectomy, left discontinuity and temporary abdominal closure device placed  · 11/17- re-exploration, partial descending colectomy, primary colocolonic anastomosis with diverting loop ileostomy, midline VAC placement  · Abdominal incision- yellow drainage; CT showed abdominal abscess and distended gallbladder  · 12/8- IR percutaneous cholecystostomy tube, hepatectomy abscess drain placement  · Monitor incisions, dressings  · Monitor CBC, CMP  · IM consulted for assistance with management  · Follow-up outpatient with hem/onc  · For IR on 12/20 to have additional drains placed for a perisplenic abscess as well as a splenic recess abscess  Antibiotics have been reinitiated earlier this week    * Malignant neoplasm of transverse colon [facility])  Assessment & Plan  · Transverse colonic apple core lesion and innumerable hepatic metastases  · Colostomy 2/22  · RCW port-a-cath, accessed  · S/p palliative chemo  · Follows with Dr Nicholas Yates (palliative)  · Follows hem/onc (Dr Anabel Rae)    Sepsis [facility])  Assessment & Plan  · SIRS versus sepsis at this time given his vitals, leukocytosis, and complicated infection history  · Had finished his course of Zosyn on 1212 and ertapenem by 1215  · Consultation to infectious disease, appreciate their recommendations  · Drawing blood cultures x2 restarting his ertapenem and obtaining a CT abdomen and pelvis at this time  · Currently improved on ertapenem, which was restarted due to increasing leukocytosis  · 12/20-IR perisplenic, perigastric drain placement for 2 additional abscesses-  · Perigastric abscess +E  Coli ESBL  · Blood cultures- NG 5 days    Acute kidney injury (HCC)  Assessment & Plan  · Creatinine baseline 1 2-1 4  · Current creatinine 1 28  · Consider consult nephrology  · Monitor BMP    Bacteremia  Assessment & Plan  · Abdominal abscess- ESBL E Coli  · Zosyn/Ertapenam 7 days (Ertapenem completed 12/15, Zosyn 12/12)  · Contact precautions  · VICTORINO drain- milky drainage  · Due to elevated leukocytosis over this past weekend reconsulted infectious disease and continues on ertapenem, potential convert to PO doxycycline    Iron deficiency anemia, unspecified  Assessment & Plan  · Hbg 8 3 (previous 8 4)  · 12/15-1 unit PRBc  · 12/16- symptomatic- 1 unit PRBc  · Monitor CBC    Obesity (BMI 30-39  9)  Assessment & Plan  · BMI 33 0  · Diet and lifestyle modifications  · Nutrition consult    Benign essential hypertension  Assessment & Plan  · Home: Lisinopril  · Here: Norvasc 5 mg BID  · Adjust medication as needed  · IM consult for assistance with management  · Monitored outpatient by PCP    Type 2 diabetes mellitus without complication, with long-term current use of insulin (HCC)  Assessment & Plan  · HbgA1c 8 0 (9/22)  · Home monitoring with Nilesh  · Home: Lantus 12 units, Humalog 3 units, Januvia  · Here: Humalog 3 units with meals, SSI, Lantus   · Follow with PCP (Dr Sandy Reynolds)      [facility] consultants medical co-management  Labs, medications, and imaging  ROS:  Review of Systems   A 10 point review of systems was negative except for what is noted in the HPI  OBJECTIVE:   /82 (BP Location: Left arm)   Pulse 104 Comment: pt just finished PT so HR may be high  Temp (!) 97 4 °F (36 3 °C) (Oral)   Resp 16   Ht 5' 10" (1 778 m)   Wt 104 kg (230 lb)   SpO2 96%   BMI 33 00 kg/m²     Physical Exam  Constitutional:       Appearance: He is obese  HENT:      Head: Normocephalic and atraumatic  Mouth/Throat:      Mouth: Mucous membranes are moist    Cardiovascular:      Rate and Rhythm: Normal rate and regular rhythm  Pulses: Normal pulses  Heart sounds: Normal heart sounds  Pulmonary:      Effort: Pulmonary effort is normal       Breath sounds: Normal breath sounds  Abdominal:      General: Bowel sounds are normal       Palpations: Abdomen is soft  Tenderness: There is abdominal tenderness  Comments: +ileostomy, +cholecystostomy tube, + 2 abdominal abscess drains, + back abscess drain   Musculoskeletal:         General: Normal range of motion  Cervical back: Normal range of motion  Skin:     General: Skin is warm and dry        Capillary Refill: Capillary refill takes less than 2 seconds  Neurological:      Mental Status: He is alert and oriented to person, place, and time  Motor: Weakness present     Psychiatric:         Mood and Affect: Mood normal          Judgment: Judgment normal        Lab Results   Component Value Date    WBC 13 22 (H) 12/23/2022    HGB 8 3 (L) 12/23/2022    HCT 26 9 (L) 12/23/2022    MCV 87 12/23/2022     12/23/2022     Lab Results   Component Value Date    SODIUM 140 12/22/2022    K 4 0 12/22/2022     (H) 12/22/2022    CO2 23 12/22/2022    BUN 17 12/22/2022    CREATININE 1 22 12/22/2022    GLUC 162 (H) 12/22/2022    CALCIUM 9 0 12/22/2022     Lab Results   Component Value Date    INR 1 12 12/19/2022    INR 1 10 11/12/2022    INR 1 24 (H) 11/09/2022    PROTIME 14 7 (H) 12/19/2022    PROTIME 14 4 11/12/2022    PROTIME 15 8 (H) 11/09/2022       Current Facility-Administered Medications:   •  acetaminophen (TYLENOL) tablet 975 mg, 975 mg, Oral, Q8H [address], GAURANG Keith, 975 mg at 12/23/22 0163  •  amLODIPine (NORVASC) tablet 5 mg, 5 mg, Oral, BID, GAURANG Keith, 5 mg at 12/23/22 0808  •  aspirin chewable tablet 81 mg, 81 mg, Oral, Daily, GAURANG Keith, 81 mg at 12/23/22 0808  •  atorvastatin (LIPITOR) tablet 40 mg, 40 mg, Oral, Daily, GAURANG Keith, 40 mg at 12/23/22 0808  •  calcium carbonate (TUMS) chewable tablet 500 mg, 500 mg, Oral, BID PRN, GAURANG Keith, 500 mg at 12/19/22 1715  •  cholecalciferol (VITAMIN D3) tablet 400 Units, 400 Units, Oral, Daily, GAURANG Keith, 400 Units at 12/23/22 0808  •  ertapenem (INVanz) 1,000 mg in sodium chloride 0 9 % 50 mL IVPB, 1,000 mg, Intravenous, Q24H, Pillo Ware MD, Stopped at 12/22/22 2100  •  heparin (porcine) subcutaneous injection 5,000 Units, 5,000 Units, Subcutaneous, Q8H [address], GAURANG Keith, 5,000 Units at 12/23/22 6644  •  insulin glargine (LANTUS) subcutaneous injection 12 Units 0 12 mL, 12 Units, Subcutaneous, , Linda Boateng, GAURANG, 12 Units at 12/22/22 2131  •  insulin lispro (HumaLOG) 100 units/mL subcutaneous injection 1-5 Units, 1-5 Units, Subcutaneous, HS, GAURANG Keith, 1 Units at 12/22/22 2130  •  insulin lispro (HumaLOG) 100 units/mL subcutaneous injection 1-6 Units, 1-6 Units, Subcutaneous, TID AC, 1 Units at 12/23/22 0640 **AND** Fingerstick Glucose (POCT), , , TID AC, GAURANG Keith  •  insulin lispro (HumaLOG) 100 units/mL subcutaneous injection 6 Units, 6 Units, Subcutaneous, TID With Meals, Phyllis Mask, RANNP, 6 Units at 12/23/22 0808  •  iohexol (OMNIPAQUE) 240 MG/ML solution 50 mL, 50 mL, Oral, 90 min pre-procedure, Jose Elias Marie MD  •  melatonin tablet 3 mg, 3 mg, Oral, HS, GAURANG Keith, 3 mg at 12/22/22 2128  •  methocarbamol (ROBAXIN) tablet 500 mg, 500 mg, Oral, BID, GAURANG Keith, 500 mg at 12/23/22 0808  •  multivitamin-minerals (CENTRUM) tablet 1 tablet, 1 tablet, Oral, Daily, GAURANG Keith, 1 tablet at 12/23/22 2265  •  ondansetron (ZOFRAN-ODT) dispersible tablet 4 mg, 4 mg, Oral, Q6H PRN, GAURANG Keith, 4 mg at 12/17/22 7116  •  oxyCODONE (ROXICODONE) immediate release tablet 10 mg, 10 mg, Oral, Q6H PRN, GAURANG Keith, 10 mg at 12/22/22 2208  •  oxyCODONE (ROXICODONE) IR tablet 2 5 mg, 2 5 mg, Oral, Q3H PRN, GAURANG Keith, 2 5 mg at 12/22/22 1909  •  oxyCODONE (ROXICODONE) IR tablet 5 mg, 5 mg, Oral, Q6H PRN, GAURANG Keith, 5 mg at 12/23/22 0859  •  pantoprazole (PROTONIX) EC tablet 40 mg, 40 mg, Oral, BID AC, GAURANG Keith, 40 mg at 12/23/22 0968  •  simethicone (MYLICON) chewable tablet 80 mg, 80 mg, Oral, 4x Daily (with meals and at bedtime), GAURANG Keith, 80 mg at 12/23/22 0808  •  tamsulosin (FLOMAX) capsule 0 4 mg, 0 4 mg, Oral, Daily With GAURANG Louis, 0 4 mg at 12/22/22 8828    Past Medical History:   Diagnosis Date   • Abdominal pain 03/12/2022   • [facility] )     [id number] resolved   • Cancer ([facility] ) • Diabetes mellitus ([place] [address] )    • Enteritis 08/23/2016   • Gastroparesis due to DM ([place] [address] ) 08/23/2016   • GERD (gastroesophageal reflux disease)    • Hernia, ventral 08/04/2016   • Hyperlipidemia    • Hypertension    • Morbid obesity ([place] [address] ) 04/17/2018   • Postoperative visit 03/02/2022   • SIRS (systemic inflammatory response syndrome) ([place] [address] ) 03/12/2022   • Snoring    • Stage 3a chronic kidney disease ([place] [address] ) 02/19/2022       Patient Active Problem List    Diagnosis Date Noted   • 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 liver ([place] [address] ) 03/02/2022   • Malignant neoplasm of transverse colon ([place] [address] ) 03/01/2022   • Sepsis ([place] [address] ) 12/17/2022   • Severe protein-calorie malnutrition ([place] [address] ) 12/14/2022   • Acute kidney injury ([place] [address] ) 12/14/2022   • Flash pulmonary edema ([place] [address] ) 11/12/2022   • MR (mitral regurgitation) 11/12/2022   • Bacteremia 11/12/2022   • ESBL (extended spectrum beta-lactamase) producing bacteria infection 11/12/2022   • Acute respiratory failure with hypoxia ([place] [address] ) 11/12/2022   • Encephalopathy 11/09/2022   • Cervical radiculopathy 10/26/2022   • Other fatigue 06/15/2022   • Colostomy prolapse ([place] [address] ) 05/27/2022   • Colon cancer metastasized to liver ([place] [address] ) 03/12/2022   • Iron deficiency anemia, unspecified 03/01/2022   • Thrombocytosis 02/21/2022   • Hypokalemia 02/19/2022   • Transaminitis 02/19/2022   • Type 2 diabetes mellitus with hyperlipidemia ([place] [address] ) 02/18/2022   • Colonic mass 02/18/2022   • Microcytic anemia 02/18/2022   • Left ureteral calculus 01/30/2020   • Incarcerated umbilical hernia 87/73/6559   • Testicular hypogonadism 06/19/2017   • Low testosterone 05/30/2017   • Type 2 diabetes mellitus without complication, with long-term current use of insulin ([place] [address] ) 08/23/2016   • Benign essential hypertension 08/23/2016   • Mixed hyperlipidemia 08/23/2016   • Erectile dysfunction 07/11/2016   • Obesity (BMI 30-39 9) 07/11/2016      GAURANG Lopez  Physical Medicine and [address]    Total visit time: 30 minutes, with more than 50% spent counseling/coordinating care  Counseling includes discussion with patient re: progress in therapies, functional issues observed by therapy staff, and discussion with patient regarding their current medical state and wellbeing  Coordination of patient's care was performed in conjunction with Internal Medicine service to monitor patient's labs, vitals, and management of their comorbidities

## 2022-12-23 NOTE — NURSING NOTE
Patient feeling very fatigued and diaphoretic with with mild chills  Temp 100 3, routine Tylenol given  Dr Davy Delvalle notified   Patient is currently on q4h vital signs with the next set due at 11pm  Instructed to notify Dr Davy Delvalle with results of 11pm VS

## 2022-12-24 LAB
ALBUMIN SERPL BCP-MCNC: 1.5 G/DL (ref 3.5–5)
ALP SERPL-CCNC: 955 U/L (ref 46–116)
ALT SERPL W P-5'-P-CCNC: 19 U/L (ref 12–78)
AST SERPL W P-5'-P-CCNC: 74 U/L (ref 5–45)
BACTERIA SPEC BFLD CULT: ABNORMAL
BACTERIA SPEC BFLD CULT: ABNORMAL
BASOPHILS # BLD AUTO: 0.04 THOUSANDS/ÂΜL (ref 0–0.1)
BASOPHILS NFR BLD AUTO: 0 % (ref 0–1)
BILIRUB DIRECT SERPL-MCNC: 0.77 MG/DL (ref 0–0.2)
BILIRUB SERPL-MCNC: 1.12 MG/DL (ref 0.2–1)
EOSINOPHIL # BLD AUTO: 0 THOUSAND/ÂΜL (ref 0–0.61)
EOSINOPHIL NFR BLD AUTO: 0 % (ref 0–6)
ERYTHROCYTE [DISTWIDTH] IN BLOOD BY AUTOMATED COUNT: 17.5 % (ref 11.6–15.1)
GLUCOSE SERPL-MCNC: 122 MG/DL (ref 65–140)
GLUCOSE SERPL-MCNC: 122 MG/DL (ref 65–140)
GLUCOSE SERPL-MCNC: 150 MG/DL (ref 65–140)
GLUCOSE SERPL-MCNC: 181 MG/DL (ref 65–140)
GLUCOSE SERPL-MCNC: 187 MG/DL (ref 65–140)
GRAM STN SPEC: ABNORMAL
HCT VFR BLD AUTO: 24.8 % (ref 36.5–49.3)
HGB BLD-MCNC: 7.4 G/DL (ref 12–17)
IMM GRANULOCYTES # BLD AUTO: 0.1 THOUSAND/UL (ref 0–0.2)
IMM GRANULOCYTES NFR BLD AUTO: 1 % (ref 0–2)
LYMPHOCYTES # BLD AUTO: 1.18 THOUSANDS/ÂΜL (ref 0.6–4.47)
LYMPHOCYTES NFR BLD AUTO: 10 % (ref 14–44)
MCH RBC QN AUTO: 26.1 PG (ref 26.8–34.3)
MCHC RBC AUTO-ENTMCNC: 29.8 G/DL (ref 31.4–37.4)
MCV RBC AUTO: 88 FL (ref 82–98)
MONOCYTES # BLD AUTO: 1.21 THOUSAND/ÂΜL (ref 0.17–1.22)
MONOCYTES NFR BLD AUTO: 10 % (ref 4–12)
NEUTROPHILS # BLD AUTO: 9.8 THOUSANDS/ÂΜL (ref 1.85–7.62)
NEUTS SEG NFR BLD AUTO: 79 % (ref 43–75)
NRBC BLD AUTO-RTO: 0 /100 WBCS
PLATELET # BLD AUTO: 365 THOUSANDS/UL (ref 149–390)
PMV BLD AUTO: 10 FL (ref 8.9–12.7)
PROT SERPL-MCNC: 7.7 G/DL (ref 6.4–8.4)
RBC # BLD AUTO: 2.83 MILLION/UL (ref 3.88–5.62)
WBC # BLD AUTO: 12.33 THOUSAND/UL (ref 4.31–10.16)

## 2022-12-24 RX ADMIN — OXYCODONE HYDROCHLORIDE 2.5 MG: 5 TABLET ORAL at 20:17

## 2022-12-24 RX ADMIN — AMLODIPINE BESYLATE 5 MG: 5 TABLET ORAL at 17:28

## 2022-12-24 RX ADMIN — INSULIN LISPRO 1 UNITS: 100 INJECTION, SOLUTION INTRAVENOUS; SUBCUTANEOUS at 13:03

## 2022-12-24 RX ADMIN — METHOCARBAMOL 500 MG: 500 TABLET ORAL at 08:51

## 2022-12-24 RX ADMIN — SIMETHICONE 80 MG: 80 TABLET, CHEWABLE ORAL at 13:18

## 2022-12-24 RX ADMIN — HEPARIN SODIUM 5000 UNITS: 5000 INJECTION INTRAVENOUS; SUBCUTANEOUS at 05:07

## 2022-12-24 RX ADMIN — OXYCODONE HYDROCHLORIDE 5 MG: 5 TABLET ORAL at 13:11

## 2022-12-24 RX ADMIN — OXYCODONE HYDROCHLORIDE 10 MG: 10 TABLET ORAL at 22:08

## 2022-12-24 RX ADMIN — INSULIN LISPRO 6 UNITS: 100 INJECTION, SOLUTION INTRAVENOUS; SUBCUTANEOUS at 09:12

## 2022-12-24 RX ADMIN — AMLODIPINE BESYLATE 5 MG: 5 TABLET ORAL at 08:52

## 2022-12-24 RX ADMIN — INSULIN LISPRO 6 UNITS: 100 INJECTION, SOLUTION INTRAVENOUS; SUBCUTANEOUS at 17:42

## 2022-12-24 RX ADMIN — Medication 1 TABLET: at 08:51

## 2022-12-24 RX ADMIN — ACETAMINOPHEN 975 MG: 325 TABLET, FILM COATED ORAL at 13:10

## 2022-12-24 RX ADMIN — INSULIN LISPRO 6 UNITS: 100 INJECTION, SOLUTION INTRAVENOUS; SUBCUTANEOUS at 13:04

## 2022-12-24 RX ADMIN — ACETAMINOPHEN 975 MG: 325 TABLET, FILM COATED ORAL at 05:07

## 2022-12-24 RX ADMIN — PANTOPRAZOLE SODIUM 40 MG: 40 TABLET, DELAYED RELEASE ORAL at 05:06

## 2022-12-24 RX ADMIN — METHOCARBAMOL 500 MG: 500 TABLET ORAL at 17:29

## 2022-12-24 RX ADMIN — MELATONIN TAB 3 MG 3 MG: 3 TAB at 22:08

## 2022-12-24 RX ADMIN — ATORVASTATIN CALCIUM 40 MG: 40 TABLET, FILM COATED ORAL at 08:51

## 2022-12-24 RX ADMIN — SIMETHICONE 80 MG: 80 TABLET, CHEWABLE ORAL at 08:51

## 2022-12-24 RX ADMIN — INSULIN GLARGINE 12 UNITS: 100 INJECTION, SOLUTION SUBCUTANEOUS at 22:10

## 2022-12-24 RX ADMIN — TAMSULOSIN HYDROCHLORIDE 0.4 MG: 0.4 CAPSULE ORAL at 17:28

## 2022-12-24 RX ADMIN — CHOLECALCIFEROL TAB 10 MCG (400 UNIT) 400 UNITS: 10 TAB at 08:53

## 2022-12-24 RX ADMIN — ASPIRIN 81 MG CHEWABLE TABLET 81 MG: 81 TABLET CHEWABLE at 08:51

## 2022-12-24 RX ADMIN — ACETAMINOPHEN 975 MG: 325 TABLET, FILM COATED ORAL at 22:09

## 2022-12-24 RX ADMIN — ERTAPENEM SODIUM 1000 MG: 1 INJECTION, POWDER, LYOPHILIZED, FOR SOLUTION INTRAMUSCULAR; INTRAVENOUS at 21:08

## 2022-12-24 RX ADMIN — SIMETHICONE 80 MG: 80 TABLET, CHEWABLE ORAL at 22:09

## 2022-12-24 RX ADMIN — SIMETHICONE 80 MG: 80 TABLET, CHEWABLE ORAL at 17:28

## 2022-12-24 RX ADMIN — INSULIN LISPRO 1 UNITS: 100 INJECTION, SOLUTION INTRAVENOUS; SUBCUTANEOUS at 17:41

## 2022-12-24 RX ADMIN — HEPARIN SODIUM 5000 UNITS: 5000 INJECTION INTRAVENOUS; SUBCUTANEOUS at 22:09

## 2022-12-24 RX ADMIN — PANTOPRAZOLE SODIUM 40 MG: 40 TABLET, DELAYED RELEASE ORAL at 17:29

## 2022-12-24 RX ADMIN — HEPARIN SODIUM 5000 UNITS: 5000 INJECTION INTRAVENOUS; SUBCUTANEOUS at 13:13

## 2022-12-24 NOTE — PROGRESS NOTES
Called to check port that was reaccessed yesterday  RN sttes No blood return  Elevated pt's HOB 30 degrees and great blood return obtained  Reported to RN

## 2022-12-24 NOTE — PROGRESS NOTES
[facility] Occupational Therapy Daily Note  Patient Active Problem List   Diagnosis    Type 2 diabetes mellitus without complication, with long-term current use of insulin ([facility] )    Benign essential hypertension    Mixed hyperlipidemia    Erectile dysfunction    Low testosterone    Obesity (BMI 30-39  9)    Testicular hypogonadism    Incarcerated umbilical hernia    Left ureteral calculus    Type 2 diabetes mellitus with hyperlipidemia (HCC)    Colonic mass    Microcytic anemia    Hypokalemia    Transaminitis    Thrombocytosis    Iron deficiency anemia, unspecified    Malignant neoplasm of transverse colon (HCC)    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)    Colon cancer metastasized to liver (HCC)    Colostomy prolapse (HCC)    Other fatigue    Cervical radiculopathy    Encephalopathy    Flash pulmonary edema (HCC)    MR (mitral regurgitation)    Bacteremia    ESBL (extended spectrum beta-lactamase) producing bacteria infection    Acute respiratory failure with hypoxia (HCC)    Severe protein-calorie malnutrition ([address] )    Acute kidney injury ([facility] )    Sepsis ([facility] )       Past Medical History:   Diagnosis Date    Abdominal pain 03/12/2022    Acute renal failure ([facility] )     [id number] resolved    Cancer ([facility] )     Diabetes mellitus ([facility] )     Enteritis 08/23/2016    Gastroparesis due to DM ([facility] ) 08/23/2016    GERD (gastroesophageal reflux disease)     Hernia, ventral 08/04/2016    Hyperlipidemia     Hypertension     Morbid obesity ([facility] ) 04/17/2018    Postoperative visit 03/02/2022    SIRS (systemic inflammatory response syndrome) ([facility] ) 03/12/2022    Snoring     Stage 3a chronic kidney disease ([facility] ) 02/19/2022     Etiologic Diagnosis: Critical illness myopathy  Restrictions/Precautions  Precautions: Fall Risk, Contact/isolation, Multiple lines, Supervision on toilet/commode (VICTORINO drain, ileostomy, IR drain)  Weight Bearing Restrictions: No  ROM Restrictions: No  ADL Team Goal: Patient will require assist with ADLs with least restrictive device upon completion of rehab program  Occupational Therapy LTG's  Eating Oral care Bathing LB dress UB dress   Eating Goal: 06  Independent - Patient completes the activity by him/herself with no assistance from a helper  Oral Hygiene Goal: 06  Independent - Patient completes the activity by him/herself with no assistance from a helper  Shower/bathe self Goal: 04  Supervision or touching assistance- [place] provides VERBAL CUES or supervision throughout activity  Lower body dressing Goal: 04  Supervision or touching assistance- [place] provides VERBAL CUES or supervision throughout activity  Upper body dressing Goal: 04  Supervision or touching assistance- [place] provides VERBAL CUES or supervision throughout activity  Toileting Toilet txf Func txf IADL Med    Toileting hygiene Goal: 04  Supervision or touching assistance- [place] provides VERBAL CUES or supervision throughout activity  Toilet transfer Goal: 04  Supervision or touching assistance- [place] provides VERBAL CUES or supervision throughout activity  Assist Level: Supervision   OT interventions: Treatment/Interventions: Functional transfer training, ADL retraining, Therapeutic exercise, Endurance training, Cognitive reorientation, Patient/family training, Equipment eval/education, Bed mobility, Compensatory technique education  Discharge Plan:  OT Discharge Recommendation: (P)  (Home with family support)   DME: Equipment Recommended:  (TBD),  ,       12/24/22 0930   Pain Assessment   Pain Assessment Tool 0-10   Pain Score No Pain   Lifestyle   Autonomy "its hard to  how I am doing "   Oral Hygiene   Type of Assistance Needed Independent   Comment seated in w/c at sink   Oral Hygiene CARE Score 6   Shower/Bathe Self   Type of Assistance Needed Physical assistance   Physical Assistance Level 26%-50%   Comment Pt compleded sponge bathing sitting in WC at sink  Limited ability to complete full UB bathing 2* UE fatigue   can complete hair washing with increased time  able to compelte james bathing in sitting  assist for all LB  Shower/Bathe Self CARE Score 3   Upper Body Dressing   Type of Assistance Needed Physical assistance   Physical Assistance Level 26%-50%   Upper Body Dressing CARE Score 3   Lower Body Dressing   Type of Assistance Needed Physical assistance   Physical Assistance Level 51%-75%   Comment pt able to doff pants in stance  unable to thread or don over hips   Lower Body Dressing CARE Score 2   Putting On/Taking Off Footwear   Type of Assistance Needed Physical assistance   Physical Assistance Level Total assistance   Putting On/Taking Off Footwear CARE Score 1   Bed-Chair Transfer   Type of Assistance Needed Physical assistance   Physical Assistance Level 26%-50%   Comment SPT w/ RW  WC to bedside  able to walk to sink for oral care tasks  Chair/Bed-to-Chair Transfer CARE Score 3   Cognition   Overall Cognitive Status WFL   Comments pt very tearful throughout session  Pt is emotionally triggered to tears during basic converstaion  Pt reporting increasing difficulty processing everything  reports stress from family who is also processing greif  pt provided with emotional support, and grounding techniques  Additional Activities   Additional Activities Comments 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 [employer] for 40 years  Pt identifies [employer] practices for breath work as very important  Pt upset that he can not stand long enough to complete  educated on activity modification  Pt engages in simple UE breathing practice  cont to focus sessions on lewis [employer] prionciples  Assessment   Treatment Assessment Pt participated in skilled OT session focusing on functional ADL retraining seated upright at the sink, activity tolerance, activity modification, and functional transfers  See above for details on ADL function  Pt continues to demonstrate fluctuations in performance with fatigue and emotional state   Pt easily abandons task, but is able to complete with vc's for technique  Fair carryover at times with sit-stand tech  Pt is frequently tearful throughout entire session, requiring time to manage emotions  Continued plan of care for OT sessions to focus on the following areas:  ADL Retraining , LB Dressing,  LHAE education/training, Functional Transfers, Functional Cognition, Standing tolerance, Standing balance , Fine motor coordination, Gross motor coordination, Fine motor strengthening , Gross motor strengthening , DME training/education, Family training/education, Energy conservation training/education, healthy coping education, Leisure and social pursuits, sitting balance and Core/trunk control/strengthening  next sessions focus on continued OOB tolerance, basic transfers func mob to/from bathroom as able, light unsupported sitting tasks, light UE strengthening     Prognosis Fair   Plan   Progress Slow progress, decreased activity tolerance   Recommendation   OT Discharge Recommendation   (Home with family support)   OT Therapy Minutes   OT Time In 0930   OT Time Out 1030   OT Total Time (minutes) 60   OT Mode of treatment - Individual (minutes) 60   OT Mode of treatment - Concurrent (minutes) 0   OT Mode of treatment - Group (minutes) 0   OT Mode of treatment - Co-treat (minutes) 0   OT Mode of Treatment - Total time(minutes) 60 minutes   OT Cumulative Minutes 690

## 2022-12-24 NOTE — PROGRESS NOTES
12/24/22 1330   Pain Assessment   Pain Assessment Tool 0-10   Pain Score 5   Pain Location/Orientation Orientation: Left; Location: Abdomen  (drains)   Restrictions/Precautions   Precautions Fall Risk;Contact/isolation;Pain;Supervision on toilet/commode  (VICTORINO drain, ileostomoy, IR drain)   Cognition   Overall Cognitive Status WFL   Subjective   Subjective "this morning wiped me out"   Roll Left and Right   Type of Assistance Needed Physical assistance   Physical Assistance Level 51%-75%   Comment needs use of bed rails   Roll Left and Right CARE Score 2   Sit to Lying   Type of Assistance Needed Physical assistance   Physical Assistance Level 76% or more   Comment needs A respositioning   Sit to Lying CARE Score 2   Lying to Sitting on Side of Bed   Type of Assistance Needed Physical assistance   Physical Assistance Level 51%-75%   Comment A with trunk   Lying to Sitting on Side of Bed CARE Score 2   Sit to Stand   Type of Assistance Needed Physical assistance   Physical Assistance Level 26%-50%   Sit to Stand CARE Score 3   Bed-Chair Transfer   Type of Assistance Needed Physical assistance; Adaptive equipment   Physical Assistance Level 26%-50%   Comment with RW   Chair/Bed-to-Chair Transfer CARE Score 3   Walk 10 Feet   Type of Assistance Needed Physical assistance   Physical Assistance Level Total assistance   Comment min/modA with CF with RW   Walk 10 Feet CARE Score 1   Walk 50 Feet with Two Turns   Type of Assistance Needed Physical assistance; Adaptive equipment   Physical Assistance Level Total assistance   Comment min/modA with CF with RW   Walk 50 Feet with Two Turns CARE Score 1   Walk 150 Feet   Reason if not Attempted Safety concerns   Walk 150 Feet CARE Score 88   Walking 10 Feet on Uneven Surfaces   Reason if not Attempted Safety concerns   Walking 10 Feet on Uneven Surfaces CARE Score 88   Ambulation   Distance Walked (feet) 30 ft  (x1; 75'x1)   Assist Device Roller Walker   Gait Pattern Inconsistant Josefina; Slow Josefina;Decreased foot clearance; Improper weight shift   Limitations Noted In Balance; Endurance;Strength   Findings became emotional when finding out on long he walked  use of UEs on RW, no buckling or LOB noted   Toilet Transfer   Comment stood to urinate   Assessment   Treatment Assessment pt emotional and tired from earlier session but agreeable to participate  provided emotional support to patient during session  pt has good motivation to participate but limited due to fatigue, pain and weakness  pt's HR after static standing and tx 121  pt able to improve gait distance this session with rest breaks as needed  pt A back to bed at end of session  cont to work on OOB tolerance, strengthening and functional mobiltiy to improve with tolerance to activity and decrease bruden of care at d/c  Problem List Decreased strength;Decreased range of motion;Decreased endurance; Impaired balance;Decreased mobility;Pain   Barriers to Discharge Inaccessible home environment;Decreased caregiver support   PT Barriers   Functional Limitation Car transfers; Ramp negotiation;Stair negotiation;Standing;Transfers; Walking; Wheelchair management   Plan   Progress Slow progress, decreased activity tolerance   Recommendation   PT Discharge Recommendation Home with home health rehabilitation   Equipment Recommended Walker   PT Therapy Minutes   PT Time In 1330   PT Time Out 1410   PT Total Time (minutes) 40   PT Mode of treatment - Individual (minutes) 40   PT Mode of treatment - Concurrent (minutes) 0   PT Mode of treatment - Group (minutes) 0   PT Mode of treatment - Co-treat (minutes) 0   PT Mode of Treatment - Total time(minutes) 40 minutes   PT Cumulative Minutes 560   Therapy Time missed   Time missed?  No

## 2022-12-24 NOTE — PROGRESS NOTES
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  • s/p hepatic resection and reversal of colostomy on 11/7  • Return to the OR 11/16 for right hemicolectomy with partial descending colectomy colocolonic anastomosis with loop ileostomy and mid line abdominal VAC placement  • Continue local wound care; WC following  • D/w Derotha Bloch from [facility] = if ileostomy is >2L for 2 days in a row then to give IVF  • CT  Abd/pelvis 12/17 = loculated collection along splenic recess   Has perisplenic hilum collection as well --> to IR 12/20 for drain placement in both perigastric and perisplenic area and cx sent from both areas = Ecoli  • Flush drains with 10ml qd; tube check 2 weeks 1/3/23  • Surgery following and monitoring drain output  • Output unchanged     Acute cholecystitis  • s/p percutaneous tube placement 12/8 with tube check on 12/12  • IR evaluated 12/14 states perc radha tube drainage may remain bloody for several days  • No further abdominal pain  • GI following due to LFT elevation = felt alkaline phosphatase elevated 2/2 infiltrative disease rather than focal biliary obstruction   AMA was negative     • Alkaline phosphatase isoenzyme sent 12/17 = GI aware that has resulted  They recommended continuing to monitor his LFTs and defer to surgical oncology for further w/u and plan  • TB fluctuating  • Monitor LFTs Mon/thursdays  • Consider MRCP if total bili trends back up  • Repeat LFT's are pending this am     Bacteremia/abdominal abscess  • ID now following due to worsening leukocytosis and restarted Ertapenem    • Blood cultures negative    • CT abdomen and pelvis as above     Hypertension  • On Norvasc 5mg BID  • stable     Diabetes mellitus  • On Lantus 12U qhs/Lispro 6U TID  • Continue diabetic diet and QID Accuchecks with SSI  • BS stable     Acute on chronic renal failure  • Stage III; baseline 1 4  • Lisinopril currently on hold  • Stable     Anemia  • Multifactorial due to recent infection, surgery, CKD stage III  • Transfused on 12/15 for hemoglobin 7 3 and 12/16/22 for hgb of 8  • Repeat hgb 12/23 8 3  • Cbc pending     Atypical chest pain  • With elevation in troponin/EKG changes  • Cardiology cleared pt for discharge  • Did not recommend any further work up  • On 12/15 had CP = Cards saw and felt was M/S  • No further chest pain     Situational depression  • Neuropsychology consulted  • Patient not interested in medications at this point in time     Malnutrition  • Recommend dietician consultation to optimize wound healing  • Glucerna makes him nauseated  • Breads etc make him too full  • On 12/15, ordered double protein     Pleural effusion  • CXR on 12/14 showed small left pleural effusion and was suspect for CHF  • ECHO 11/9/22 = LVEF 55%, unable to assess diastolic function, mild TR  • asymptomatic  • stable        Discharge date:  Team       The above assessment and plan was reviewed and updated as determined by my evaluation of the patient on 12/24/2022      Labs:   Results from last 7 days   Lab Units 12/23/22  1054 12/23/22  0637 12/22/22  0552   WBC Thousand/uL  --  13 22* 14 80*   HEMOGLOBIN g/dL 8 3* 7 6* 8 4*   HEMATOCRIT % 26 9* 24 6* 27 9*   PLATELETS Thousands/uL  --  309 366     Results from last 7 days   Lab Units 12/22/22  0552 12/21/22  0748   SODIUM mmol/L 140 138   POTASSIUM mmol/L 4 0 4 0   CHLORIDE mmol/L 110* 108   CO2 mmol/L 23 23   BUN mg/dL 17 18   CREATININE mg/dL 1 22 1 28   CALCIUM mg/dL 9 0 8 7         Results from last 7 days   Lab Units 12/19/22  0841   INR  1 12     Results from last 7 days   Lab Units 12/24/22  0625 12/23/22  2117 12/23/22  1612   POC GLUCOSE mg/dl 122 140 200*       Review of Scheduled Meds:  Current Facility-Administered Medications Medication Dose Route Frequency Provider Last Rate   • acetaminophen  975 mg Oral Q8H [address] GAURANG Kieth     • amLODIPine  5 mg Oral BID GAURANG Fernández     • aspirin  81 mg Oral Daily GAURANG Keith     • atorvastatin  40 mg Oral Daily GAURANG Keith     • calcium carbonate  500 mg Oral BID PRN GAURANG Fernández     • cholecalciferol  400 Units Oral Daily GAURANG Keith     • ertapenem  1,000 mg Intravenous Q24H Pillo Ware MD 1,000 mg (12/23/22 2104)   • heparin (porcine)  5,000 Units Subcutaneous Q8H [address] GAURANG Keith     • insulin glargine  12 Units Subcutaneous HS GAURANG Hurley     • insulin lispro  1-5 Units Subcutaneous HS GAURANG Keith     • insulin lispro  1-6 Units Subcutaneous TID AC GAURANG Keith     • insulin lispro  6 Units Subcutaneous TID With Meals GAURANG Hurley     • iohexol  50 mL Oral 90 min pre-procedure Pillo Ware MD     • melatonin  3 mg Oral HS GAURANG Keith     • methocarbamol  500 mg Oral BID GAUARNG Fernández     • multivitamin-minerals  1 tablet Oral Daily GAURANG Keith     • ondansetron  4 mg Oral Q6H PRN GAURANG Fernández     • oxyCODONE  10 mg Oral Q6H PRN GAURANG Keith     • oxyCODONE  2 5 mg Oral Q3H PRN GAURANG Fernández     • oxyCODONE  5 mg Oral Q6H PRN GAURANG Keith     • pantoprazole  40 mg Oral BID AC GAURANG Keith     • simethicone  80 mg Oral 4x Daily (with meals and at bedtime) GAURANG Fernández     • tamsulosin  0 4 mg Oral Daily With Dinner GAURANG Fernández         Subjective/ HPI: Patient seen and examined  Patients overnight issues or events were reviewed with nursing or staff during rounds or morning huddle session  New or overnight issues include the following:     Pt seen in his room  Feels well this am, no complaints overnight  Denies abdominal pain this am     ROS:   A 10 point ROS was performed; negative except as noted above        *Labs /Radiology studies reviewed  *Medications reviewed and reconciled as needed  *Please refer to order section for additional ordered labs studies  *Case discussed with primary attending during morning huddle case rounds    Physical Examination:  Vitals:   Vitals:    12/23/22 1900 12/23/22 2300 12/24/22 0507 12/24/22 0849   BP: 127/74 130/71 141/73 146/80   BP Location: Left arm Left arm Left arm    Pulse: 100 87 98 81   Resp: 18 18 18    Temp: 99 °F (37 2 °C) 98 8 °F (37 1 °C) 98 5 °F (36 9 °C)    TempSrc: Oral Oral Oral    SpO2: 93% 93% 94%    Weight:       Height:           GEN: No apparent distress, interactive  NEURO: Alert and oriented x3  HEENT: Pupils are equal and reactive, EOMI, mucous membranes are moist, face symmetrical  CV: S1 S2 regular, no MRG, no peripheral edema noted  RESP: Lungs are clear bilaterally, no wheezes, rales or rhonchi noted, on room air, respirations easy and non labored  GI: obese, soft non tender, non distended; +BS x4; choley drain with dark brown drainage, the other drains with milky white drainage; ileostomy with loose brown stool   : Voiding without difficulty  MUSC: Moves all extremities; generalized deconditioning  SKIN: pink, warm and dry, normal turgor, no rashes, lesions; abdominal incisions refer to media images, no evidence of infection          The above physical exam was reviewed and updated as determined by my evaluation of the patient on 12/24/2022  Invasive Devices     Central Venous Catheter Line  Duration           Port A Cath 03/10/22 Right Chest 288 days          Drain  Duration           Ileostomy LUQ 36 days    Abscess Drain Abdomen 15 days    Cholecystostomy Tube 11 days    Abscess Drain Abdomen 3 days    Abscess Drain Back 3 days                   VTE Pharmacologic Prophylaxis: Heparin  Code Status: Level 1 - Full Code  Current Length of Stay: 10 day(s)      Total time spent:  30 minutes with more than 50% spent counseling/coordinating care   Counseling includes discussion with patient re: progress  and discussion with patient of his/her current medical state/information  Coordination of patient's care was performed in conjunction with primary service  Time invested included review of patient's labs, vitals, and management of their comorbidities with continued monitoring  In addition, this patient was discussed with medical team including physician and advanced extenders  The care of the patient was extensively discussed and appropriate treatment plan was formulated unique for this patient  Medical decision making for the day was made by supervising physician unless otherwise noted in their attestation statement  ** Please Note:  voice to text software may have been used in the creation of this document   Although proof errors in transcription or interpretation are a potential of such software**

## 2022-12-24 NOTE — PLAN OF CARE
Problem: Prexisting or High Potential for Compromised Skin Integrity  Goal: Skin integrity is maintained or improved  Description: INTERVENTIONS:  - Identify patients at risk for skin breakdown  - Assess and monitor skin integrity  - Assess and monitor nutrition and hydration status  - Monitor labs   - Assess for incontinence   - Turn and reposition patient  - Assist with mobility/ambulation  - Relieve pressure over bony prominences  - Avoid friction and shearing  - Provide appropriate hygiene as needed including keeping skin clean and dry  - Evaluate need for skin moisturizer/barrier cream  - Collaborate with interdisciplinary team   - Patient/family teaching  - Consider wound care consult   Outcome: Progressing     Problem: Potential for Falls  Goal: Patient will remain free of falls  Description: INTERVENTIONS:  - Educate patient/family on patient safety including physical limitations  - Instruct patient to call for assistance with activity   - Consult OT/PT to assist with strengthening/mobility   - Keep Call bell within reach  - Keep bed low and locked with side rails adjusted as appropriate  - Keep care items and personal belongings within reach  - Initiate and maintain comfort rounds  - Make Fall Risk Sign visible to staff  - Offer Toileting every  Hours, in advance of need  - Initiate/Maintainarm  - Obtain necessary fall risk management equipment:  - Apply yellow socks and bracelet for high fall risk patients  - Consider moving patient to room near nurses station  Outcome: Progressing     Problem: PAIN - ADULT  Goal: Verbalizes/displays adequate comfort level or baseline comfort level  Description: Interventions:  - Encourage patient to monitor pain and request assistance  - Assess pain using appropriate pain scale  - Administer analgesics based on type and severity of pain and evaluate response  - Implement non-pharmacological measures as appropriate and evaluate response  - Consider cultural and social influences on pain and pain management  - Notify physician/advanced practitioner if interventions unsuccessful or patient reports new pain  Outcome: Progressing     Problem: INFECTION - ADULT  Goal: Absence or prevention of progression during hospitalization  Description: INTERVENTIONS:  - Assess and monitor for signs and symptoms of infection  - Monitor lab/diagnostic results  - Monitor all insertion sites, i e  indwelling lines, tubes, and drains  - Monitor endotracheal if appropriate and nasal secretions for changes in amount and color  - [place] appropriate cooling/warming therapies per order  - Administer medications as ordered  - Instruct and encourage patient and family to use good hand hygiene technique  - Identify and instruct in appropriate isolation precautions for identified infection/condition  Outcome: Progressing     Problem: SAFETY ADULT  Goal: Patient will remain free of falls  Description: INTERVENTIONS:  - Educate patient/family on patient safety including physical limitations  - Instruct patient to call for assistance with activity   - Consult OT/PT to assist with strengthening/mobility   - Keep Call bell within reach  - Keep bed low and locked with side rails adjusted as appropriate  - Keep care items and personal belongings within reach  - Initiate and maintain comfort rounds  - Make Fall Risk Sign visible to staff  - Offer Toileting every  Hours, in advance of need  - Initiate/Maintain larm  - Obtain necessary fall risk management equipment:  - Apply yellow socks and bracelet for high fall risk patients  - Consider moving patient to room near nurses station  Outcome: Progressing  Goal: Maintain or return to baseline ADL function  Description: INTERVENTIONS:  -  Assess patient's ability to carry out ADLs; assess patient's baseline for ADL function and identify physical deficits which impact ability to perform ADLs (bathing, care of mouth/teeth, toileting, grooming, dressing, etc )  - Assess/evaluate cause of self-care deficits   - Assess range of motion  - Assess patient's mobility; develop plan if impaired  - Assess patient's need for assistive devices and provide as appropriate  - Encourage maximum independence but intervene and supervise when necessary  - Involve family in performance of ADLs  - Assess for home care needs following discharge   - Consider OT consult to assist with ADL evaluation and planning for discharge  - Provide patient education as appropriate  Outcome: Progressing  Goal: Maintains/Returns to pre admission functional level  Description: INTERVENTIONS:  - Perform BMAT or MOVE assessment daily    - Set and communicate daily mobility goal to care team and patient/family/caregiver  - Collaborate with rehabilitation services on mobility goals if consulted  - Perform Range of  - Out of bed for toileting  - Record patient progress and toleration of activity level   Outcome: Progressing     Problem: DISCHARGE PLANNING  Goal: Discharge to home or other facility with appropriate resources  Description: INTERVENTIONS:  - Identify barriers to discharge w/patient and caregiver  - Arrange for needed discharge resources and transportation as appropriate  - Identify discharge learning needs (meds, wound care, etc )  - Arrange for interpretive services to assist at discharge as needed  - Refer to Case Management Department for coordinating discharge planning if the patient needs post-hospital services based on physician/advanced practitioner order or complex needs related to functional status, cognitive ability, or social support system  Outcome: Progressing     Problem: Nutrition/Hydration-ADULT  Goal: Nutrient/Hydration intake appropriate for improving, restoring or maintaining nutritional needs  Description: Monitor and assess patient's nutrition/hydration status for malnutrition  Collaborate with interdisciplinary team and initiate plan and interventions as ordered    Monitor patient's weight and dietary intake as ordered or per policy  Utilize nutrition screening tool and intervene as necessary  Determine patient's food preferences and provide high-protein, high-caloric foods as appropriate       INTERVENTIONS:  - Monitor oral intake, urinary output, labs, and treatment plans  - Assess nutrition and hydration status and recommend course of action  - Evaluate amount of meals eaten  - Assist patient with eating if necessary   - Allow adequate time for meals  - Recommend/ encourage appropriate diets, oral nutritional supplements, and vitamin/mineral supplements  - Order, calculate, and assess calorie counts as needed  - Recommend, monitor, and adjust tube feedings and TPN/PPN based on assessed needs  - Assess need for intravenous fluids  - Provide specific nutrition/hydration education as appropriate  - Include patient/family/caregiver in decisions related to nutrition  Outcome: Progressing     Problem: MOBILITY - ADULT  Goal: Maintain or return to baseline ADL function  Description: INTERVENTIONS:  -  Assess patient's ability to carry out ADLs; assess patient's baseline for ADL function and identify physical deficits which impact ability to perform ADLs (bathing, care of mouth/teeth, toileting, grooming, dressing, etc )  - Assess/evaluate cause of self-care deficits   - Assess range of motion  - Assess patient's mobility; develop plan if impaired  - Assess patient's need for assistive devices and provide as appropriate  - Encourage maximum independence but intervene and supervise when necessary  - Involve family in performance of ADLs  - Assess for home care needs following discharge   - Consider OT consult to assist with ADL evaluation and planning for discharge  - Provide patient education as appropriate  Outcome: Progressing  Goal: Maintains/Returns to pre admission functional level  Description: INTERVENTIONS:  - Perform BMAT or MOVE assessment daily    - Set and communicate daily mobility goal to care team and patient/family/caregiver     - Collaborate with rehabilitation services on mobility goals if consulted  - Perform Range of  - Out of bed for toileting  - Record patient progress and toleration of activity level   Outcome: Progressing

## 2022-12-24 NOTE — PROGRESS NOTES
Progress Note - Surgical Oncology  Jaguar Mackenzie 61 y o  male MRN: [id number]  Unit/Bed#: [facility] 418-72 Encounter: [id number]    Assessment:  61 y o  M with PMHx of metastatic colon cancer s/p exploratory laparotomy, hepatic segment 3 resection, transverse colectomy and reversal of loop colostomy on 60/4, complicated by an anastomotic leak requiring re-exploration, right hemicolectomy 11/16 and subsequent primary ileocolonic anastomosis with diverting loop ileostomy 11/17  Patient was readmitted for acute cholecystitis s/p percutaneous cholecystostomy tube placement on 12/8 and now s/p IR drainage of intra-abdominal fluid collections on 12/20    UOP: 750 cc  Perc radha tube: 10 cc blood tinged bilious drainage  LUQ VICTORINO drains x 3 with 10,15,10 cc thick purulent drainage   Ileostomy 300 cc    Plan:  Diet as tolerated  Maintain IR drains  Continue Abx per ID - Ertapenem  Maintain perc radha tube  Will discuss bone scan and MRCP given atient's alkaline phosphatase fractionation   Prn analgesia  PT/OT  DVT ppx   Remainder of care per primary team    Subjective/Objective     Subjective: No acute events overnight  Afebrile, hemodynamically stable  Tolerating diet  No nausea, or vomiting, fevers or chills  Says he feels improved this morning, denies pain  Objective:     Blood pressure 141/73, pulse 98, temperature 98 5 °F (36 9 °C), temperature source Oral, resp  rate 18, height 5' 10" (1 778 m), weight 104 kg (230 lb), SpO2 94 %  ,Body mass index is 33 kg/m²        Intake/Output Summary (Last 24 hours) at 12/24/2022 0711  Last data filed at 12/24/2022 0530  Gross per 24 hour   Intake 650 ml   Output 1095 ml   Net -445 ml       Invasive Devices     Central Venous Catheter Line  Duration           Port A Cath 03/10/22 Right Chest 288 days          Drain  Duration           Ileostomy LUQ 36 days    Abscess Drain Abdomen 15 days    Cholecystostomy Tube 11 days    Abscess Drain Abdomen 3 days    Abscess Drain Back 3 days Physical Exam:  General: No acute distress, alert and oriented  CV: Well perfused, regular rate and rhythm  Lungs: Normal work of breathing, no increased respiratory effort  Abdomen: Soft, non-tender, non-distended   Ostomy in place, perc radha tube in place, 3x VICTORINO  Extremities: No edema, clubbing or cyanosis  Skin: Warm, dry          Lab, Imaging and other studies:  CBC:   Lab Results   Component Value Date    HGB 8 3 (L) 12/23/2022    HCT 26 9 (L) 12/23/2022   , CMP:   No results found for: SODIUM, K, CL, CO2, ANIONGAP, BUN, CREATININE, GLUCOSE, CALCIUM, AST, ALT, ALKPHOS, PROT, BILITOT, EGFR  VTE Pharmacologic Prophylaxis: Heparin  VTE Mechanical Prophylaxis: sequential compression device

## 2022-12-25 LAB
GLUCOSE SERPL-MCNC: 111 MG/DL (ref 65–140)
GLUCOSE SERPL-MCNC: 152 MG/DL (ref 65–140)
GLUCOSE SERPL-MCNC: 158 MG/DL (ref 65–140)
GLUCOSE SERPL-MCNC: 168 MG/DL (ref 65–140)
GLUCOSE SERPL-MCNC: 95 MG/DL (ref 65–140)

## 2022-12-25 RX ADMIN — SIMETHICONE 80 MG: 80 TABLET, CHEWABLE ORAL at 22:03

## 2022-12-25 RX ADMIN — INSULIN GLARGINE 12 UNITS: 100 INJECTION, SOLUTION SUBCUTANEOUS at 22:02

## 2022-12-25 RX ADMIN — METHOCARBAMOL 500 MG: 500 TABLET ORAL at 07:58

## 2022-12-25 RX ADMIN — OXYCODONE HYDROCHLORIDE 2.5 MG: 5 TABLET ORAL at 19:11

## 2022-12-25 RX ADMIN — PANTOPRAZOLE SODIUM 40 MG: 40 TABLET, DELAYED RELEASE ORAL at 07:49

## 2022-12-25 RX ADMIN — INSULIN LISPRO 6 UNITS: 100 INJECTION, SOLUTION INTRAVENOUS; SUBCUTANEOUS at 07:55

## 2022-12-25 RX ADMIN — METHOCARBAMOL 500 MG: 500 TABLET ORAL at 17:54

## 2022-12-25 RX ADMIN — INSULIN LISPRO 1 UNITS: 100 INJECTION, SOLUTION INTRAVENOUS; SUBCUTANEOUS at 18:00

## 2022-12-25 RX ADMIN — MELATONIN TAB 3 MG 3 MG: 3 TAB at 22:03

## 2022-12-25 RX ADMIN — OXYCODONE HYDROCHLORIDE 10 MG: 10 TABLET ORAL at 22:03

## 2022-12-25 RX ADMIN — HEPARIN SODIUM 5000 UNITS: 5000 INJECTION INTRAVENOUS; SUBCUTANEOUS at 18:01

## 2022-12-25 RX ADMIN — INSULIN LISPRO 1 UNITS: 100 INJECTION, SOLUTION INTRAVENOUS; SUBCUTANEOUS at 07:54

## 2022-12-25 RX ADMIN — SIMETHICONE 80 MG: 80 TABLET, CHEWABLE ORAL at 11:41

## 2022-12-25 RX ADMIN — HEPARIN SODIUM 5000 UNITS: 5000 INJECTION INTRAVENOUS; SUBCUTANEOUS at 22:02

## 2022-12-25 RX ADMIN — ACETAMINOPHEN 975 MG: 325 TABLET, FILM COATED ORAL at 22:03

## 2022-12-25 RX ADMIN — SIMETHICONE 80 MG: 80 TABLET, CHEWABLE ORAL at 17:54

## 2022-12-25 RX ADMIN — Medication 1 TABLET: at 07:56

## 2022-12-25 RX ADMIN — ERTAPENEM SODIUM 1000 MG: 1 INJECTION, POWDER, LYOPHILIZED, FOR SOLUTION INTRAMUSCULAR; INTRAVENOUS at 19:41

## 2022-12-25 RX ADMIN — ASPIRIN 81 MG CHEWABLE TABLET 81 MG: 81 TABLET CHEWABLE at 07:57

## 2022-12-25 RX ADMIN — TAMSULOSIN HYDROCHLORIDE 0.4 MG: 0.4 CAPSULE ORAL at 17:54

## 2022-12-25 RX ADMIN — ACETAMINOPHEN 975 MG: 325 TABLET, FILM COATED ORAL at 14:40

## 2022-12-25 RX ADMIN — INSULIN LISPRO 6 UNITS: 100 INJECTION, SOLUTION INTRAVENOUS; SUBCUTANEOUS at 12:47

## 2022-12-25 RX ADMIN — PANTOPRAZOLE SODIUM 40 MG: 40 TABLET, DELAYED RELEASE ORAL at 17:55

## 2022-12-25 RX ADMIN — CHOLECALCIFEROL TAB 10 MCG (400 UNIT) 400 UNITS: 10 TAB at 08:00

## 2022-12-25 RX ADMIN — AMLODIPINE BESYLATE 5 MG: 5 TABLET ORAL at 08:02

## 2022-12-25 RX ADMIN — ACETAMINOPHEN 975 MG: 325 TABLET, FILM COATED ORAL at 05:44

## 2022-12-25 RX ADMIN — INSULIN LISPRO 6 UNITS: 100 INJECTION, SOLUTION INTRAVENOUS; SUBCUTANEOUS at 18:27

## 2022-12-25 RX ADMIN — HEPARIN SODIUM 5000 UNITS: 5000 INJECTION INTRAVENOUS; SUBCUTANEOUS at 05:45

## 2022-12-25 RX ADMIN — SIMETHICONE 80 MG: 80 TABLET, CHEWABLE ORAL at 07:49

## 2022-12-25 RX ADMIN — ATORVASTATIN CALCIUM 40 MG: 40 TABLET, FILM COATED ORAL at 07:59

## 2022-12-25 NOTE — PROGRESS NOTES
Progress Note - Surgical Oncology  Hillary Burroughs 61 y o  male MRN: [id number]  Unit/Bed#: -02 Encounter: [id number]    Assessment:  61 y o  M with PMHx of metastatic colon cancer s/p exploratory laparotomy, hepatic segment 3 resection, transverse colectomy and reversal of loop colostomy on 35/4, complicated by an anastomotic leak requiring re-exploration, right hemicolectomy 11/16 and subsequent primary ileocolonic anastomosis with diverting loop ileostomy 11/17  Patient was readmitted for acute cholecystitis s/p percutaneous cholecystostomy tube placement on 12/8 and now s/p IR drainage of intra-abdominal fluid collections on 12/20    Afebrile  VSS  On room air   WBC pending for today (trending down yesterday 12 from 13)    Perc radha tube: 0 cc recorded, blood tinged bilious drainage in bag  LUQ VICTORINO drains x 3 with 15,20,10 cc thick purulent drainage   Ileostomy 725cc formed stool and gas  Plan:  Diet as tolerated  Maintain IR drains  Continue Abx per ID - Ertapenem  Maintain perc radha tube  Continue ileostomy care and teaching  Prn analgesia  PT/OT  DVT ppx   Remainder of care per primary team    Subjective/Objective     Subjective: No acute events overnight  He complains of L sided abdominal pain around his drains, controlled with oxycodone  Tolerating diet without nausea/vomiting  No fevers, chills  Objective:     Blood pressure 168/79, pulse 100, temperature 98 2 °F (36 8 °C), temperature source Oral, resp  rate 18, height 5' 10" (1 778 m), weight 104 kg (230 lb), SpO2 94 %  ,Body mass index is 33 kg/m²        Intake/Output Summary (Last 24 hours) at 12/25/2022 0618  Last data filed at 12/25/2022 0357  Gross per 24 hour   Intake 350 ml   Output 1080 ml   Net -730 ml       Invasive Devices     Central Venous Catheter Line  Duration           Port A Cath 03/10/22 Right Chest 289 days          Drain  Duration           Ileostomy LUQ 37 days    Abscess Drain Abdomen 16 days    Cholecystostomy Tube 12 days    Abscess Drain Abdomen 4 days    Abscess Drain Back 4 days                Physical Exam:   NAD, alert and oriented x3  Normocephalic, atraumatic  Moist mucous membranes   Norm resp effort on room air   Regular rate  Abd soft, nondistended, mild LUQ tenderness  L abdominal drains x 3 with purulent drainage  Perc radha tube with serosanguinous drainage   Ileostomy is viable with stool and gas in bag   No calf tenderness or peripheral edema  CN grossly intact   Skin is warm and dry        Lab, Imaging and other studies:  CBC:   Lab Results   Component Value Date    WBC 12 33 (H) 12/24/2022    HGB 7 4 (L) 12/24/2022    HCT 24 8 (L) 12/24/2022    MCV 88 12/24/2022     12/24/2022    MCH 26 1 (L) 12/24/2022    MCHC 29 8 (L) 12/24/2022    RDW 17 5 (H) 12/24/2022    MPV 10 0 12/24/2022    NRBC 0 12/24/2022   , CMP:   Lab Results   Component Value Date    AST 74 (H) 12/24/2022    ALT 19 12/24/2022    ALKPHOS 955 (H) 12/24/2022     VTE Pharmacologic Prophylaxis: Heparin  VTE Mechanical Prophylaxis: sequential compression device

## 2022-12-25 NOTE — PROGRESS NOTES
Internal Medicine Progress Note  Patient: Raissa Petty  Age/sex: 61 y o  male  Medical Record #: [id number]      ASSESSMENT/PLAN: (Interval History)  Raissa Petty is seen and examined and management for following issues:    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  • s/p hepatic resection and reversal of colostomy on 11/7  • Return to the OR 11/16 for right hemicolectomy with partial descending colectomy colocolonic anastomosis with loop ileostomy and mid line abdominal VAC placement  • Continue local wound care; WC following  • Ileostomy output stable  • CT  Abd/pelvis 12/17 = loculated collection along splenic recess   Has perisplenic hilum collection as well --> to IR 12/20 for drain placement in both perigastric and perisplenic area and cx sent from both areas = Ecoli  • Flush drains with 10ml qd; tube check 2 weeks 1/3/23  • Surgery following and monitoring drain output  • Output unchanged     Acute cholecystitis  • s/p percutaneous tube placement 12/8 with tube check on 12/12  • IR evaluated 12/14 states perc radha tube drainage may remain bloody for several days  • GI following due to LFT elevation = felt alkaline phosphatase elevated 2/2 infiltrative disease rather than focal biliary obstruction   AMA was negative     • Alkaline phosphatase isoenzyme sent 12/17 = GI aware that has resulted  They recommended continuing to monitor his LFTs and defer to surgical oncology for further w/u and plan  • TB trending down  • Consider MRCP if total bili trends back up  • cmp in am     Bacteremia/abdominal abscess  • ID following and continuing Ertapenem through the weekend    • Blood cultures negative    • CT abdomen and pelvis as above     Hypertension  • On Norvasc 5mg BID  • stable     Diabetes mellitus  • On Lantus 12U qhs/Lispro 6U TID  • Continue diabetic diet and QID Accuchecks with SSI  • BS stable     Acute on chronic renal failure  • Stage III; baseline 1 4  • Lisinopril currently on hold  • Stable     Anemia  • Multifactorial due to recent infection, surgery, CKD stage III  • Transfused on 12/15 for hemoglobin 7 3 and 12/16/22 for hgb of 8   • hgb 7 4 12/24  • Cbc in am     Atypical chest pain  • With elevation in troponin/EKG changes  • Cardiology cleared pt for discharge  • Did not recommend any further work up  • On 12/15 had CP = Cards saw and felt was M/S  • No further chest pain     Situational depression  • Neuropsychology consulted  • Patient not interested in medications at this point in time     Malnutrition  • Recommend dietician consultation to optimize wound healing  • Glucerna makes him nauseated  • On 12/15, ordered double protein  • He will bring supplement from home     Pleural effusion  • CXR on 12/14 showed small left pleural effusion and was suspect for CHF  • ECHO 11/9/22 = LVEF 55%, unable to assess diastolic function, mild TR  • asymptomatic  • stable        Discharge date:  Team       The above assessment and plan was reviewed and updated as determined by my evaluation of the patient on 12/25/2022      Labs:   Results from last 7 days   Lab Units 12/24/22  1057 12/23/22  1054 12/23/22  0637   WBC Thousand/uL 12 33*  --  13 22*   HEMOGLOBIN g/dL 7 4* 8 3* 7 6*   HEMATOCRIT % 24 8* 26 9* 24 6*   PLATELETS Thousands/uL 365  --  309     Results from last 7 days   Lab Units 12/22/22  0552 12/21/22  0748   SODIUM mmol/L 140 138   POTASSIUM mmol/L 4 0 4 0   CHLORIDE mmol/L 110* 108   CO2 mmol/L 23 23   BUN mg/dL 17 18   CREATININE mg/dL 1 22 1 28   CALCIUM mg/dL 9 0 8 7         Results from last 7 days   Lab Units 12/19/22  0841   INR  1 12     Results from last 7 days   Lab Units 12/25/22  0636 12/24/22  2106 12/24/22  1738   POC GLUCOSE mg/dl 158* 122 181*       Review of Scheduled Meds:  Current Facility-Administered Medications   Medication Dose Route Frequency Provider Last Rate   • acetaminophen  975 mg Oral Q8H [address] GAURANG Keith     • amLODIPine  5 mg Oral BID GAURANG Canas     • aspirin  81 mg Oral Daily GAURANG Keith     • atorvastatin  40 mg Oral Daily GAURANG Keith     • calcium carbonate  500 mg Oral BID PRN GAURANG Canas     • cholecalciferol  400 Units Oral Daily GAURANG Keith     • ertapenem  1,000 mg Intravenous Q24H Nando Frost MD 1,000 mg (12/24/22 2108)   • heparin (porcine)  5,000 Units Subcutaneous Q8H [address] GAURANG Keith     • insulin glargine  12 Units Subcutaneous HS [employer], GAURANG     • insulin lispro  1-5 Units Subcutaneous HS GAURANG Keith     • insulin lispro  1-6 Units Subcutaneous TID AC GAURANG Keith     • insulin lispro  6 Units Subcutaneous TID With Meals GAURANG Joaquin     • iohexol  50 mL Oral 90 min pre-procedure Nando Frost MD     • melatonin  3 mg Oral HS GAURANG Keith     • methocarbamol  500 mg Oral BID GAURANG Canas     • multivitamin-minerals  1 tablet Oral Daily GAURANG Keith     • ondansetron  4 mg Oral Q6H PRN GAURANG Canas     • oxyCODONE  10 mg Oral Q6H PRN GAURANG Canas     • oxyCODONE  2 5 mg Oral Q3H PRN GAURANG Canas     • oxyCODONE  5 mg Oral Q6H PRN GAURANG Keith     • pantoprazole  40 mg Oral BID AC GAURANG Keith     • simethicone  80 mg Oral 4x Daily (with meals and at bedtime) GAURANG Canas     • tamsulosin  0 4 mg Oral Daily With Dinner GAURANG Canas         Subjective/ HPI: Patient seen and examined  Patients overnight issues or events were reviewed with nursing or staff during rounds or morning huddle session  New or overnight issues include the following:     Pt seen in his room  No complaints overnight, states he is still a bit tired this am  Denies any abdominal pain or fever  Looking forward to family visiting today  ROS:   A 10 point ROS was performed; negative except as noted above        *Labs /Radiology studies reviewed  *Medications reviewed and reconciled as needed  *Please refer to order section for additional ordered labs studies  *Case discussed with primary attending during morning huddle case rounds    Physical Examination:  Vitals:   Vitals:    12/24/22 0507 12/24/22 0849 12/24/22 2100 12/25/22 0554   BP: 141/73 146/80 168/79 132/74   BP Location: Left arm  Left arm Right arm   Pulse: 98 81 100 87   Resp: 18  18 20   Temp: 98 5 °F (36 9 °C)  98 2 °F (36 8 °C) 97 6 °F (36 4 °C)   TempSrc: Oral  Oral Oral   SpO2: 94%  94% 96%   Weight:       Height:           GEN: No apparent distress, interactive  NEURO: Alert and oriented x3  HEENT: Pupils are equal and reactive, EOMI, mucous membranes are moist, face symmetrical  CV: S1 S2 regular, no MRG, no peripheral edema noted  RESP: Lungs are clear bilaterally, no wheezes, rales or rhonchi noted, on room air, respirations easy and non labored  GI: obese, soft non tender, non distended; +BS x4; choley drain with dark brown drainage, the other drains with milky white drainage; ileostomy with loose brown stool; outputs reviewed  : Voiding without difficulty  MUSC: Moves all extremities; generalized deconditioning  SKIN: pink, warm and dry, normal turgor, no rashes, lesions; abdominal incisions refer to media images, no evidence of infection          The above physical exam was reviewed and updated as determined by my evaluation of the patient on 12/25/2022  Invasive Devices     Central Venous Catheter Line  Duration           Port A Cath 03/10/22 Right Chest 289 days          Drain  Duration           Ileostomy LUQ 37 days    Abscess Drain Abdomen 16 days    Cholecystostomy Tube 12 days    Abscess Drain Abdomen 4 days    Abscess Drain Back 4 days                   VTE Pharmacologic Prophylaxis: Heparin  Code Status: Level 1 - Full Code  Current Length of Stay: 11 day(s)      Total time spent:  30 minutes with more than 50% spent counseling/coordinating care   Counseling includes discussion with patient re: progress  and discussion with patient of his/her current medical state/information  Coordination of patient's care was performed in conjunction with primary service  Time invested included review of patient's labs, vitals, and management of their comorbidities with continued monitoring  In addition, this patient was discussed with medical team including physician and advanced extenders  The care of the patient was extensively discussed and appropriate treatment plan was formulated unique for this patient  Medical decision making for the day was made by supervising physician unless otherwise noted in their attestation statement  ** Please Note:  voice to text software may have been used in the creation of this document   Although proof errors in transcription or interpretation are a potential of such software**

## 2022-12-25 NOTE — PLAN OF CARE
Problem: Prexisting or High Potential for Compromised Skin Integrity  Goal: Skin integrity is maintained or improved  Description: INTERVENTIONS:  - Identify patients at risk for skin breakdown  - Assess and monitor skin integrity  - Assess and monitor nutrition and hydration status  - Monitor labs   - Assess for incontinence   - Turn and reposition patient  - Assist with mobility/ambulation  - Relieve pressure over bony prominences  - Avoid friction and shearing  - Provide appropriate hygiene as needed including keeping skin clean and dry  - Evaluate need for skin moisturizer/barrier cream  - Collaborate with interdisciplinary team   - Patient/family teaching  - Consider wound care consult   Outcome: Progressing     Problem: Potential for Falls  Goal: Patient will remain free of falls  Description: INTERVENTIONS:  - Educate patient/family on patient safety including physical limitations  - Instruct patient to call for assistance with activity   - Consult OT/PT to assist with strengthening/mobility   - Keep Call bell within reach  - Keep bed low and locked with side rails adjusted as appropriate  - Keep care items and personal belongings within reach  - Initiate and maintain comfort rounds  - Make Fall Risk Sign visible to staff  - Offer Toileting every 2 Hours, in advance of need    - Apply yellow socks and bracelet for high fall risk patients  - Consider moving patient to room near nurses station  Outcome: Progressing     Problem: PAIN - ADULT  Goal: Verbalizes/displays adequate comfort level or baseline comfort level  Description: Interventions:  - Encourage patient to monitor pain and request assistance  - Assess pain using appropriate pain scale  - Administer analgesics based on type and severity of pain and evaluate response  - Implement non-pharmacological measures as appropriate and evaluate response  - Consider cultural and social influences on pain and pain management  - Notify physician/advanced practitioner if interventions unsuccessful or patient reports new pain  Outcome: Progressing     Problem: INFECTION - ADULT  Goal: Absence or prevention of progression during hospitalization  Description: INTERVENTIONS:  - Assess and monitor for signs and symptoms of infection  - Monitor lab/diagnostic results  - Monitor all insertion sites, i e  indwelling lines, tubes, and drains  - Monitor endotracheal if appropriate and nasal secretions for changes in amount and color  - [place] appropriate cooling/warming therapies per order  - Administer medications as ordered  - Instruct and encourage patient and family to use good hand hygiene technique  - Identify and instruct in appropriate isolation precautions for identified infection/condition  Outcome: Progressing     Problem: SAFETY ADULT  Goal: Patient will remain free of falls  Description: INTERVENTIONS:  - Educate patient/family on patient safety including physical limitations  - Instruct patient to call for assistance with activity   - Consult OT/PT to assist with strengthening/mobility   - Keep Call bell within reach  - Keep bed low and locked with side rails adjusted as appropriate  - Keep care items and personal belongings within reach  - Initiate and maintain comfort rounds  - Make Fall Risk Sign visible to staff  - Offer Toileting every 2 Hours, in advance of need    - Apply yellow socks and bracelet for high fall risk patients  - Consider moving patient to room near nurses station  Outcome: Progressing  Goal: Maintain or return to baseline ADL function  Description: INTERVENTIONS:  -  Assess patient's ability to carry out ADLs; assess patient's baseline for ADL function and identify physical deficits which impact ability to perform ADLs (bathing, care of mouth/teeth, toileting, grooming, dressing, etc )  - Assess/evaluate cause of self-care deficits   - Assess range of motion  - Assess patient's mobility; develop plan if impaired  - Assess patient's need for assistive devices and provide as appropriate  - Encourage maximum independence but intervene and supervise when necessary  - Involve family in performance of ADLs  - Assess for home care needs following discharge   - Consider OT consult to assist with ADL evaluation and planning for discharge  - Provide patient education as appropriate  Outcome: Progressing  Goal: Maintains/Returns to pre admission functional level  Description: INTERVENTIONS:  - Perform BMAT or MOVE assessment daily    - Set and communicate daily mobility goal to care team and patient/family/caregiver  - Collaborate with rehabilitation services on mobility goals if consulted    - Reposition patient every 2 hours  - Out of bed to chair 1 times a day   - Out of bed for meals 1 times a day  - Out of bed for toileting  - Record patient progress and toleration of activity level   Outcome: Progressing     Problem: DISCHARGE PLANNING  Goal: Discharge to home or other facility with appropriate resources  Description: INTERVENTIONS:  - Identify barriers to discharge w/patient and caregiver  - Arrange for needed discharge resources and transportation as appropriate  - Identify discharge learning needs (meds, wound care, etc )  - Arrange for interpretive services to assist at discharge as needed  - Refer to Case Management Department for coordinating discharge planning if the patient needs post-hospital services based on physician/advanced practitioner order or complex needs related to functional status, cognitive ability, or social support system  Outcome: Progressing     Problem: Nutrition/Hydration-ADULT  Goal: Nutrient/Hydration intake appropriate for improving, restoring or maintaining nutritional needs  Description: Monitor and assess patient's nutrition/hydration status for malnutrition  Collaborate with interdisciplinary team and initiate plan and interventions as ordered  Monitor patient's weight and dietary intake as ordered or per policy  Utilize nutrition screening tool and intervene as necessary  Determine patient's food preferences and provide high-protein, high-caloric foods as appropriate  INTERVENTIONS:  - Monitor oral intake, urinary output, labs, and treatment plans  - Assess nutrition and hydration status and recommend course of action  - Evaluate amount of meals eaten  - Assist patient with eating if necessary   - Allow adequate time for meals  - Recommend/ encourage appropriate diets, oral nutritional supplements, and vitamin/mineral supplements  - Order, calculate, and assess calorie counts as needed  - Recommend, monitor, and adjust tube feedings and TPN/PPN based on assessed needs  - Assess need for intravenous fluids  - Provide specific nutrition/hydration education as appropriate  - Include patient/family/caregiver in decisions related to nutrition  Outcome: Progressing     Problem: MOBILITY - ADULT  Goal: Maintain or return to baseline ADL function  Description: INTERVENTIONS:  -  Assess patient's ability to carry out ADLs; assess patient's baseline for ADL function and identify physical deficits which impact ability to perform ADLs (bathing, care of mouth/teeth, toileting, grooming, dressing, etc )  - Assess/evaluate cause of self-care deficits   - Assess range of motion  - Assess patient's mobility; develop plan if impaired  - Assess patient's need for assistive devices and provide as appropriate  - Encourage maximum independence but intervene and supervise when necessary  - Involve family in performance of ADLs  - Assess for home care needs following discharge   - Consider OT consult to assist with ADL evaluation and planning for discharge  - Provide patient education as appropriate  Outcome: Progressing  Goal: Maintains/Returns to pre admission functional level  Description: INTERVENTIONS:  - Perform BMAT or MOVE assessment daily    - Set and communicate daily mobility goal to care team and patient/family/caregiver     - Collaborate with rehabilitation services on mobility goals if consulted    - Reposition patient every 2 hours    - Dangle patient 2 times a day    - Out of bed to chair 1 times a day   - Out of bed for meals 1 times a day  - Out of bed for toileting  - Record patient progress and toleration of activity level   Outcome: Progressing

## 2022-12-26 LAB
ALBUMIN SERPL BCP-MCNC: 1.5 G/DL (ref 3.5–5)
ALP SERPL-CCNC: 983 U/L (ref 46–116)
ALT SERPL W P-5'-P-CCNC: 16 U/L (ref 12–78)
ANION GAP SERPL CALCULATED.3IONS-SCNC: 7 MMOL/L (ref 4–13)
AST SERPL W P-5'-P-CCNC: 51 U/L (ref 5–45)
BASOPHILS # BLD AUTO: 0.04 THOUSANDS/ÂΜL (ref 0–0.1)
BASOPHILS NFR BLD AUTO: 0 % (ref 0–1)
BILIRUB SERPL-MCNC: 1.04 MG/DL (ref 0.2–1)
BUN SERPL-MCNC: 25 MG/DL (ref 5–25)
CALCIUM ALBUM COR SERPL-MCNC: 10.3 MG/DL (ref 8.3–10.1)
CALCIUM SERPL-MCNC: 8.3 MG/DL (ref 8.3–10.1)
CHLORIDE SERPL-SCNC: 109 MMOL/L (ref 96–108)
CO2 SERPL-SCNC: 22 MMOL/L (ref 21–32)
CREAT SERPL-MCNC: 1.41 MG/DL (ref 0.6–1.3)
EOSINOPHIL # BLD AUTO: 0 THOUSAND/ÂΜL (ref 0–0.61)
EOSINOPHIL NFR BLD AUTO: 0 % (ref 0–6)
ERYTHROCYTE [DISTWIDTH] IN BLOOD BY AUTOMATED COUNT: 17.4 % (ref 11.6–15.1)
GFR SERPL CREATININE-BSD FRML MDRD: 54 ML/MIN/1.73SQ M
GLUCOSE P FAST SERPL-MCNC: 198 MG/DL (ref 65–99)
GLUCOSE SERPL-MCNC: 124 MG/DL (ref 65–140)
GLUCOSE SERPL-MCNC: 174 MG/DL (ref 65–140)
GLUCOSE SERPL-MCNC: 198 MG/DL (ref 65–140)
GLUCOSE SERPL-MCNC: 206 MG/DL (ref 65–140)
GLUCOSE SERPL-MCNC: 216 MG/DL (ref 65–140)
HCT VFR BLD AUTO: 22.8 % (ref 36.5–49.3)
HGB BLD-MCNC: 7.3 G/DL (ref 12–17)
IMM GRANULOCYTES # BLD AUTO: 0.12 THOUSAND/UL (ref 0–0.2)
IMM GRANULOCYTES NFR BLD AUTO: 1 % (ref 0–2)
LYMPHOCYTES # BLD AUTO: 1.72 THOUSANDS/ÂΜL (ref 0.6–4.47)
LYMPHOCYTES NFR BLD AUTO: 14 % (ref 14–44)
MCH RBC QN AUTO: 26.8 PG (ref 26.8–34.3)
MCHC RBC AUTO-ENTMCNC: 32 G/DL (ref 31.4–37.4)
MCV RBC AUTO: 84 FL (ref 82–98)
MONOCYTES # BLD AUTO: 1.4 THOUSAND/ÂΜL (ref 0.17–1.22)
MONOCYTES NFR BLD AUTO: 11 % (ref 4–12)
NEUTROPHILS # BLD AUTO: 9.02 THOUSANDS/ÂΜL (ref 1.85–7.62)
NEUTS SEG NFR BLD AUTO: 74 % (ref 43–75)
NRBC BLD AUTO-RTO: 0 /100 WBCS
PLATELET # BLD AUTO: 373 THOUSANDS/UL (ref 149–390)
PMV BLD AUTO: 9.8 FL (ref 8.9–12.7)
POTASSIUM SERPL-SCNC: 4.1 MMOL/L (ref 3.5–5.3)
PROT SERPL-MCNC: 8 G/DL (ref 6.4–8.4)
RBC # BLD AUTO: 2.72 MILLION/UL (ref 3.88–5.62)
SODIUM SERPL-SCNC: 138 MMOL/L (ref 135–147)
WBC # BLD AUTO: 12.3 THOUSAND/UL (ref 4.31–10.16)

## 2022-12-26 RX ORDER — SODIUM CHLORIDE 9 MG/ML
100 INJECTION, SOLUTION INTRAVENOUS ONCE
Status: COMPLETED | OUTPATIENT
Start: 2022-12-26 | End: 2022-12-26

## 2022-12-26 RX ADMIN — CHOLECALCIFEROL TAB 10 MCG (400 UNIT) 400 UNITS: 10 TAB at 08:12

## 2022-12-26 RX ADMIN — METHOCARBAMOL 500 MG: 500 TABLET ORAL at 18:08

## 2022-12-26 RX ADMIN — ACETAMINOPHEN 975 MG: 325 TABLET, FILM COATED ORAL at 05:14

## 2022-12-26 RX ADMIN — INSULIN LISPRO 6 UNITS: 100 INJECTION, SOLUTION INTRAVENOUS; SUBCUTANEOUS at 16:31

## 2022-12-26 RX ADMIN — AMLODIPINE BESYLATE 5 MG: 5 TABLET ORAL at 08:10

## 2022-12-26 RX ADMIN — ACETAMINOPHEN 975 MG: 325 TABLET, FILM COATED ORAL at 14:06

## 2022-12-26 RX ADMIN — INSULIN LISPRO 6 UNITS: 100 INJECTION, SOLUTION INTRAVENOUS; SUBCUTANEOUS at 08:13

## 2022-12-26 RX ADMIN — SIMETHICONE 80 MG: 80 TABLET, CHEWABLE ORAL at 21:06

## 2022-12-26 RX ADMIN — PANTOPRAZOLE SODIUM 40 MG: 40 TABLET, DELAYED RELEASE ORAL at 16:31

## 2022-12-26 RX ADMIN — TAMSULOSIN HYDROCHLORIDE 0.4 MG: 0.4 CAPSULE ORAL at 16:31

## 2022-12-26 RX ADMIN — Medication 1 TABLET: at 08:09

## 2022-12-26 RX ADMIN — ACETAMINOPHEN 975 MG: 325 TABLET, FILM COATED ORAL at 21:05

## 2022-12-26 RX ADMIN — PANTOPRAZOLE SODIUM 40 MG: 40 TABLET, DELAYED RELEASE ORAL at 05:14

## 2022-12-26 RX ADMIN — ERTAPENEM SODIUM 1000 MG: 1 INJECTION, POWDER, LYOPHILIZED, FOR SOLUTION INTRAMUSCULAR; INTRAVENOUS at 18:13

## 2022-12-26 RX ADMIN — INSULIN LISPRO 2 UNITS: 100 INJECTION, SOLUTION INTRAVENOUS; SUBCUTANEOUS at 16:31

## 2022-12-26 RX ADMIN — AMLODIPINE BESYLATE 5 MG: 5 TABLET ORAL at 18:08

## 2022-12-26 RX ADMIN — SIMETHICONE 80 MG: 80 TABLET, CHEWABLE ORAL at 16:31

## 2022-12-26 RX ADMIN — HEPARIN SODIUM 5000 UNITS: 5000 INJECTION INTRAVENOUS; SUBCUTANEOUS at 05:14

## 2022-12-26 RX ADMIN — ATORVASTATIN CALCIUM 40 MG: 40 TABLET, FILM COATED ORAL at 08:10

## 2022-12-26 RX ADMIN — HEPARIN SODIUM 5000 UNITS: 5000 INJECTION INTRAVENOUS; SUBCUTANEOUS at 21:07

## 2022-12-26 RX ADMIN — OXYCODONE HYDROCHLORIDE 5 MG: 5 TABLET ORAL at 21:06

## 2022-12-26 RX ADMIN — METHOCARBAMOL 500 MG: 500 TABLET ORAL at 08:10

## 2022-12-26 RX ADMIN — INSULIN LISPRO 2 UNITS: 100 INJECTION, SOLUTION INTRAVENOUS; SUBCUTANEOUS at 11:27

## 2022-12-26 RX ADMIN — SIMETHICONE 80 MG: 80 TABLET, CHEWABLE ORAL at 08:10

## 2022-12-26 RX ADMIN — INSULIN LISPRO 1 UNITS: 100 INJECTION, SOLUTION INTRAVENOUS; SUBCUTANEOUS at 08:12

## 2022-12-26 RX ADMIN — MELATONIN TAB 3 MG 3 MG: 3 TAB at 21:05

## 2022-12-26 RX ADMIN — INSULIN GLARGINE 12 UNITS: 100 INJECTION, SOLUTION SUBCUTANEOUS at 21:06

## 2022-12-26 RX ADMIN — SODIUM CHLORIDE 100 ML/HR: 0.9 INJECTION, SOLUTION INTRAVENOUS at 07:41

## 2022-12-26 RX ADMIN — ASPIRIN 81 MG CHEWABLE TABLET 81 MG: 81 TABLET CHEWABLE at 08:09

## 2022-12-26 NOTE — PROGRESS NOTES
12/26/22 1000   Pain Assessment   Pain Assessment Tool 0-10   Pain Score 4   Pain Location/Orientation Location: Generalized; Location: Abdomen; Location: Back   Hospital Pain Intervention(s) Emotional support   Restrictions/Precautions   Precautions Fall Risk;Contact/isolation;Multiple lines;Supervision on toilet/commode  (VICTORINO drain, ileostomy, IR drain)   Weight Bearing Restrictions No   ROM Restrictions No   Sit to Lying   Type of Assistance Needed Physical assistance   Physical Assistance Level 25% or less   Comment A for repositioning only, was able to control trunk with HOB only slightly elevated with use of rails and was able to complete BLEs up into bed  Sit to Lying CARE Score 3   Sit to Stand   Type of Assistance Needed Physical assistance   Physical Assistance Level 26%-50%   Comment improved carryover of technique this session, min/modA to boost initially from sit to stand   Sit to Stand CARE Score 3   Bed-Chair Transfer   Type of Assistance Needed Physical assistance   Physical Assistance Level 26%-50%   Comment RW   Chair/Bed-to-Chair Transfer CARE Score 3   Exercise Tools   Other Exercise Tool 1 Pt engaged in b/l radha activity with use of 4 # weight moving through all planes 3 x 10 each to increase UB strength, ROM, and endurance for increased independence with ADL tasks and functional transfers  Pt tolerates well with rest breaks to manage fatigue  Cognition   Overall Cognitive Status WFL   Arousal/Participation Cooperative   Attention Attends with cues to redirect   Orientation Level Oriented X4   Memory Within functional limits   Following Commands Follows one step commands with increased time or repetition   Activity Tolerance   Activity Tolerance Patient limited by fatigue;Patient limited by pain   Assessment   Treatment Assessment Pt participated in skilled OT services with focus on strengthening, functional txfers, and bed mobility   Pt continues to be limited by fatigue, dec endurance, dec act julia, dec strength, dec standing julia/bal  Pt will continue to benefit from skilled OT services with focus on above mentioned deficits to inc safety and independence with I/ADL tasks  Prognosis Fair   Problem List Decreased strength;Decreased range of motion;Decreased endurance; Impaired balance;Decreased mobility;Pain   Plan   Treatment/Interventions ADL retraining;Functional transfer training; Therapeutic exercise; Endurance training;Cognitive reorientation;Patient/family training;Equipment eval/education; Bed mobility; Compensatory technique education   Recommendation   OT Discharge Recommendation   (home with family support)   OT Therapy Minutes   OT Time In 1000   OT Time Out 1030   OT Total Time (minutes) 30   OT Mode of treatment - Individual (minutes) 30   OT Mode of treatment - Concurrent (minutes) 0   OT Mode of treatment - Group (minutes) 0   OT Mode of treatment - Co-treat (minutes) 0   OT Mode of Treatment - Total time(minutes) 30 minutes   OT Cumulative Minutes 720   Therapy Time missed   Time missed?  No Burow's Graft Text: The defect edges were debeveled with a #15 scalpel blade.  Given the location of the defect, shape of the defect, the proximity to free margins and the presence of a standing cone deformity a Burow's skin graft was deemed most appropriate. The standing cone was removed and this tissue was then trimmed to the shape of the primary defect. The adipose tissue was also removed until only dermis and epidermis were left.  The skin margins of the secondary defect were undermined to an appropriate distance in all directions utilizing iris scissors.  The secondary defect was closed with interrupted buried subcutaneous sutures.  The skin edges were then re-apposed with running  sutures.  The skin graft was then placed in the primary defect and oriented appropriately.

## 2022-12-26 NOTE — PROGRESS NOTES
12/26/22 0900   Pain Assessment   Pain Assessment Tool FLACC   Pain Rating: FLACC (Rest) - Face 0   Pain Rating: FLACC (Rest) - Legs 0   Pain Rating: FLACC (Rest) - Activity 0   Pain Rating: FLACC (Rest) - Cry 0   Pain Rating: FLACC (Rest) - Consolability 0   Score: FLACC (Rest) 0   Pain Rating: FLACC (Activity) - Face 1   Pain Rating: FLACC (Activity) - Legs 0   Pain Rating: FLACC (Activity) - Activity 0   Pain Rating: FLACC (Activity) - Cry 0   Pain Rating: FLACC (Activity) - Consolability 0   Score: FLACC (Activity) 1   Restrictions/Precautions   Precautions Fall Risk;Contact/isolation;Multiple lines;Supervision on toilet/commode;Pain  (Dillan drain, ileostomy, IR Drain)   Cognition   Overall Cognitive Status WFL   Subjective   Subjective Patient ready to participate in PT session   Lying to Sitting on Side of Bed   Type of Assistance Needed Supervision   Physical Assistance Level No physical assistance   Comment however relied heavily on bed railing and HOB elevated; recommend hospital bed for home   Lying to Sitting on Side of Bed CARE Score 4   Sit to Stand   Type of Assistance Needed Physical assistance   Physical Assistance Level 26%-50%   Comment with fatigue, required boost from chair; improved carryover on hand placement   Sit to Stand CARE Score 3   Bed-Chair Transfer   Type of Assistance Needed Physical assistance   Physical Assistance Level 26%-50%   Comment min/mod A with RW   Chair/Bed-to-Chair Transfer CARE Score 3   Transfer Bed/Chair/Wheelchair   Limitations Noted In Balance; Endurance;LE Strength;Confidence   Adaptive Equipment Roller Walker   Car Transfer   Comment trial upcoming session   Walk 10 Feet   Type of Assistance Needed Physical assistance   Physical Assistance Level 26%-50%   Comment reported dizziness at the end of ambulation needed rest and instruction on PLB; RW   Walk 10 Feet CARE Score 3   Walk 50 Feet with Two Turns   Type of Assistance Needed Physical assistance   Physical Assistance Level 26%-50%   Comment reported dizziness at the end of ambulation needed rest and instruction on PLB; RW   Walk 50 Feet with Two Turns CARE Score 3   Walk 150 Feet   Reason if not Attempted Safety concerns   Walk 150 Feet CARE Score 88   Walking 10 Feet on Uneven Surfaces   Reason if not Attempted Safety concerns   Walking 10 Feet on Uneven Surfaces CARE Score 88   Ambulation   Primary Mode of Locomotion Prior to Admission Walk  (x2)   Distance Walked (feet) 50 ft  (x2 + 15')   Assist Device Roller Walker   Gait Pattern Inconsistant Josefina; Slow Josefina;Decreased foot clearance;Shuffle   Limitations Noted In Balance; Endurance; Heel Strike; Sequencing;Swing;Strength;Speed   Does the patient walk? 2  Yes   Wheelchair mobility   Findings Will likely place w/c goals   Therapeutic Interventions   Strengthening Supine BLE 30x DF/PF, 20x quad sets, 20x glute sets, 8x heel slides  Seated BLE, 10x LAQ, 20x heel/toe rasies   Flexibility BLE heel cord and HS gentle stretch TERT 4 minutes   Assessment   Treatment Assessment Patient continues to make improvements in strength and endurance with skilled PT intervention  He has increased tolerance to participate in sessions with additional therapy sessions following  Related his energy level to a "gas tank" and discussed how to replenish his tank with nutrition and rest  Seated rest and instruction on PLB provided throughout session to allow for increased participation and dec "exhaustion"  He is ambulating more household distances but was limited today by onset of dizziness  Anticipate need to add w/c level goals and focus this for of locomotion for energy conservation, etc  Please practice next session along with at least 1 step  Plan to also speak with wife to provide therapy update and follow up with recommendation on ramping, etc  At this time, patient benefiting from skilled PT intervention to maximize his overall strength, function and mobility     Problem List Decreased strength;Decreased endurance; Impaired balance;Decreased mobility;Obesity; Decreased skin integrity;Orthopedic restrictions;Pain   Barriers to Discharge Inaccessible home environment;Decreased caregiver support   PT Barriers   Functional Limitation Car transfers; Ramp negotiation;Standing;Transfers; Walking;Stair negotiation; Wheelchair management   Plan   Treatment/Interventions Functional transfer training;LE strengthening/ROM; Therapeutic exercise; Endurance training;Patient/family training;Equipment eval/education; Bed mobility; Compensatory technique education;Gait training   Progress Progressing toward goals   PT Therapy Minutes   PT Time In 0900   PT Time Out 1000   PT Total Time (minutes) 60   PT Mode of treatment - Individual (minutes) 60   PT Mode of treatment - Concurrent (minutes) 0   PT Mode of treatment - Group (minutes) 0   PT Mode of treatment - Co-treat (minutes) 0   PT Mode of Treatment - Total time(minutes) 60 minutes   PT Cumulative Minutes 620

## 2022-12-26 NOTE — PROGRESS NOTES
Progress Note - Surgical Oncology  Cari Gates 61 y o  male MRN: [id number]  Unit/Bed#: [facility] 020-69 Encounter: [id number]    Assessment:  61 y o  M with PMHx of metastatic colon cancer s/p exploratory laparotomy, hepatic segment 3 resection, transverse colectomy and reversal of loop colostomy on 28/3, complicated by an anastomotic leak requiring re-exploration, right hemicolectomy 11/16 and subsequent primary ileocolonic anastomosis with diverting loop ileostomy 11/17  Patient was readmitted for acute cholecystitis s/p percutaneous cholecystostomy tube placement on 12/8 and now s/p IR drainage of intra-abdominal fluid collections on 12/20    Afebrile  Hemodynamically stable  On room air  Progressing  WBC 12 3 from 12 3  Hgb 7 3 from 7 4    Perc radha tube: 10 cc recorded, blood tinged bilious drainage in bag  LUQ VICTORINO drains x 3 with 5,5,5 cc thick purulent drainage   Ileostomy 1575cc formed stool and gas  Plan:  Diet as tolerated  Recommend fluid bolus for Cr rise in setting of high ostomy output  Maintain IR drains  Continue Abx per ID - Ertapenem  Maintain perc radha tube  Continue ileostomy care  Prn analgesia  PT/OT  DVT ppx   Remainder of care per primary team    Subjective/Objective     Subjective: No acute events overnight  Afebrile, hemodynamically stable  Tolerating diet  No nausea, or vomiting, fevers or chills  Doing well this morning  Says he was able to sleep overnight  He has been ambulating the halls yesterday with walker and assistance  Objective:     Blood pressure 127/71, pulse 84, temperature 98 2 °F (36 8 °C), temperature source Oral, resp  rate 18, height 5' 10" (1 778 m), weight 104 kg (230 lb), SpO2 98 %  ,Body mass index is 33 kg/m²        Intake/Output Summary (Last 24 hours) at 12/26/2022 0625  Last data filed at 12/26/2022 0546  Gross per 24 hour   Intake 10 ml   Output 1800 ml   Net -1790 ml       Invasive Devices     Central Venous Catheter Line  Duration           Port A Cath 03/10/22 Right Chest 290 days          Drain  Duration           Ileostomy LUQ 38 days    Abscess Drain Abdomen 17 days    Cholecystostomy Tube 13 days    Abscess Drain Abdomen 5 days    Abscess Drain Back 5 days                Physical Exam:  General: No acute distress, alert and oriented  CV: Well perfused, regular rate and rhythm  Lungs: Normal work of breathing, no increased respiratory effort  Abdomen: Soft, non-tender, non-distended  3x abdominal drain, perc radha drain   Ileostomy in place and is pink, patent, productive  Extremities: No edema, clubbing or cyanosis  Skin: Warm, dry          Lab, Imaging and other studies:  CBC:   Lab Results   Component Value Date    WBC 12 30 (H) 12/26/2022    HGB 7 3 (L) 12/26/2022    HCT 22 8 (L) 12/26/2022    MCV 84 12/26/2022     12/26/2022    MCH 26 8 12/26/2022    MCHC 32 0 12/26/2022    RDW 17 4 (H) 12/26/2022    MPV 9 8 12/26/2022    NRBC 0 12/26/2022   , CMP:   Lab Results   Component Value Date    SODIUM 138 12/26/2022    K 4 1 12/26/2022     (H) 12/26/2022    CO2 22 12/26/2022    BUN 25 12/26/2022    CREATININE 1 41 (H) 12/26/2022    CALCIUM 8 3 12/26/2022    AST 51 (H) 12/26/2022    ALT 16 12/26/2022    ALKPHOS 983 (H) 12/26/2022    EGFR 54 12/26/2022     VTE Pharmacologic Prophylaxis: Heparin  VTE Mechanical Prophylaxis: sequential compression device

## 2022-12-26 NOTE — PLAN OF CARE
Problem: Prexisting or High Potential for Compromised Skin Integrity  Goal: Skin integrity is maintained or improved  Description: INTERVENTIONS:  - Identify patients at risk for skin breakdown  - Assess and monitor skin integrity  - Assess and monitor nutrition and hydration status  - Monitor labs   - Assess for incontinence   - Turn and reposition patient  - Assist with mobility/ambulation  - Relieve pressure over bony prominences  - Avoid friction and shearing  - Provide appropriate hygiene as needed including keeping skin clean and dry  - Evaluate need for skin moisturizer/barrier cream  - Collaborate with interdisciplinary team   - Patient/family teaching  - Consider wound care consult   Outcome: Progressing     Problem: Potential for Falls  Goal: Patient will remain free of falls  Description: INTERVENTIONS:  - Educate patient/family on patient safety including physical limitations  - Instruct patient to call for assistance with activity   - Consult OT/PT to assist with strengthening/mobility   - Keep Call bell within reach  - Keep bed low and locked with side rails adjusted as appropriate  - Keep care items and personal belongings within reach  - Initiate and maintain comfort rounds  - Make Fall Risk Sign visible to staff  - Offer Toileting every Hours, in advance of need  - Initiate/Maintain alarm  - Obtain necessary fall risk management equipment:   - Apply yellow socks and bracelet for high fall risk patients  - Consider moving patient to room near nurses station  Outcome: Progressing     Problem: PAIN - ADULT  Goal: Verbalizes/displays adequate comfort level or baseline comfort level  Description: Interventions:  - Encourage patient to monitor pain and request assistance  - Assess pain using appropriate pain scale  - Administer analgesics based on type and severity of pain and evaluate response  - Implement non-pharmacological measures as appropriate and evaluate response  - Consider cultural and social influences on pain and pain management  - Notify physician/advanced practitioner if interventions unsuccessful or patient reports new pain  Outcome: Progressing     Problem: INFECTION - ADULT  Goal: Absence or prevention of progression during hospitalization  Description: INTERVENTIONS:  - Assess and monitor for signs and symptoms of infection  - Monitor lab/diagnostic results  - Monitor all insertion sites, i e  indwelling lines, tubes, and drains  - Monitor endotracheal if appropriate and nasal secretions for changes in amount and color  - Girard appropriate cooling/warming therapies per order  - Administer medications as ordered  - Instruct and encourage patient and family to use good hand hygiene technique  - Identify and instruct in appropriate isolation precautions for identified infection/condition  Outcome: Progressing     Problem: SAFETY ADULT  Goal: Patient will remain free of falls  Description: INTERVENTIONS:  - Educate patient/family on patient safety including physical limitations  - Instruct patient to call for assistance with activity   - Consult OT/PT to assist with strengthening/mobility   - Keep Call bell within reach  - Keep bed low and locked with side rails adjusted as appropriate  - Keep care items and personal belongings within reach  - Initiate and maintain comfort rounds  - Make Fall Risk Sign visible to staff  - Offer Toileting every  Hours, in advance of need  - Initiate/Maintain alarm  - Obtain necessary fall risk management equipment:   - Apply yellow socks and bracelet for high fall risk patients  - Consider moving patient to room near nurses station  Outcome: Progressing  Goal: Maintain or return to baseline ADL function  Description: INTERVENTIONS:  -  Assess patient's ability to carry out ADLs; assess patient's baseline for ADL function and identify physical deficits which impact ability to perform ADLs (bathing, care of mouth/teeth, toileting, grooming, dressing, etc )  - Assess/evaluate cause of self-care deficits   - Assess range of motion  - Assess patient's mobility; develop plan if impaired  - Assess patient's need for assistive devices and provide as appropriate  - Encourage maximum independence but intervene and supervise when necessary  - Involve family in performance of ADLs  - Assess for home care needs following discharge   - Consider OT consult to assist with ADL evaluation and planning for discharge  - Provide patient education as appropriate  Outcome: Progressing  Goal: Maintains/Returns to pre admission functional level  Description: INTERVENTIONS:  - Perform BMAT or MOVE assessment daily    - Set and communicate daily mobility goal to care team and patient/family/caregiver  - Collaborate with rehabilitation services on mobility goals if consulted  - Perform Range of Motion  times a day  - Reposition patient every  hours    - Dangle patient  times a day  - Stand patient  times a day  - Ambulate patient times a day  - Out of bed to chair times a day   - Out of bed for meals  times a day  - Out of bed for toileting  - Record patient progress and toleration of activity level   Outcome: Progressing     Problem: DISCHARGE PLANNING  Goal: Discharge to home or other facility with appropriate resources  Description: INTERVENTIONS:  - Identify barriers to discharge w/patient and caregiver  - Arrange for needed discharge resources and transportation as appropriate  - Identify discharge learning needs (meds, wound care, etc )  - Arrange for interpretive services to assist at discharge as needed  - Refer to Case Management Department for coordinating discharge planning if the patient needs post-hospital services based on physician/advanced practitioner order or complex needs related to functional status, cognitive ability, or social support system  Outcome: Progressing     Problem: Nutrition/Hydration-ADULT  Goal: Nutrient/Hydration intake appropriate for improving, restoring or maintaining nutritional needs  Description: Monitor and assess patient's nutrition/hydration status for malnutrition  Collaborate with interdisciplinary team and initiate plan and interventions as ordered  Monitor patient's weight and dietary intake as ordered or per policy  Utilize nutrition screening tool and intervene as necessary  Determine patient's food preferences and provide high-protein, high-caloric foods as appropriate       INTERVENTIONS:  - Monitor oral intake, urinary output, labs, and treatment plans  - Assess nutrition and hydration status and recommend course of action  - Evaluate amount of meals eaten  - Assist patient with eating if necessary   - Allow adequate time for meals  - Recommend/ encourage appropriate diets, oral nutritional supplements, and vitamin/mineral supplements  - Order, calculate, and assess calorie counts as needed  - Recommend, monitor, and adjust tube feedings and TPN/PPN based on assessed needs  - Assess need for intravenous fluids  - Provide specific nutrition/hydration education as appropriate  - Include patient/family/caregiver in decisions related to nutrition  Outcome: Progressing     Problem: MOBILITY - ADULT  Goal: Maintain or return to baseline ADL function  Description: INTERVENTIONS:  -  Assess patient's ability to carry out ADLs; assess patient's baseline for ADL function and identify physical deficits which impact ability to perform ADLs (bathing, care of mouth/teeth, toileting, grooming, dressing, etc )  - Assess/evaluate cause of self-care deficits   - Assess range of motion  - Assess patient's mobility; develop plan if impaired  - Assess patient's need for assistive devices and provide as appropriate  - Encourage maximum independence but intervene and supervise when necessary  - Involve family in performance of ADLs  - Assess for home care needs following discharge   - Consider OT consult to assist with ADL evaluation and planning for discharge  - Provide patient education as appropriate  Outcome: Progressing  Goal: Maintains/Returns to pre admission functional level  Description: INTERVENTIONS:  - Perform BMAT or MOVE assessment daily    - Set and communicate daily mobility goal to care team and patient/family/caregiver  - Collaborate with rehabilitation services on mobility goals if consulted  - Perform Range of Motion times a day  - Reposition patient every hours    - Dangle patient  times a day  - Stand patienttimes a day  - Ambulate patient times a day  - Out of bed to chair  times a day   - Out of bed for meals  times a day  - Out of bed for toileting  - Record patient progress and toleration of activity level   Outcome: Progressing

## 2022-12-26 NOTE — PLAN OF CARE
Problem: Prexisting or High Potential for Compromised Skin Integrity  Goal: Skin integrity is maintained or improved  Description: INTERVENTIONS:  - Identify patients at risk for skin breakdown  - Assess and monitor skin integrity  - Assess and monitor nutrition and hydration status  - Monitor labs   - Assess for incontinence   - Turn and reposition patient  - Assist with mobility/ambulation  - Relieve pressure over bony prominences  - Avoid friction and shearing  - Provide appropriate hygiene as needed including keeping skin clean and dry  - Evaluate need for skin moisturizer/barrier cream  - Collaborate with interdisciplinary team   - Patient/family teaching  - Consider wound care consult   Outcome: Progressing     Problem: Potential for Falls  Goal: Patient will remain free of falls  Description: INTERVENTIONS:  - Educate patient/family on patient safety including physical limitations  - Instruct patient to call for assistance with activity   - Consult OT/PT to assist with strengthening/mobility   - Keep Call bell within reach  - Keep bed low and locked with side rails adjusted as appropriate  - Keep care items and personal belongings within reach  - Initiate and maintain comfort rounds  - Make Fall Risk Sign visible to staff  - Offer Toileting every  Hours, in advance of need  - Initiate/Maintainlarm  - Obtain necessary fall risk management equipment:   - Apply yellow socks and bracelet for high fall risk patients  - Consider moving patient to room near nurses station  Outcome: Progressing     Problem: PAIN - ADULT  Goal: Verbalizes/displays adequate comfort level or baseline comfort level  Description: Interventions:  - Encourage patient to monitor pain and request assistance  - Assess pain using appropriate pain scale  - Administer analgesics based on type and severity of pain and evaluate response  - Implement non-pharmacological measures as appropriate and evaluate response  - Consider cultural and social influences on pain and pain management  - Notify physician/advanced practitioner if interventions unsuccessful or patient reports new pain  Outcome: Progressing     Problem: INFECTION - ADULT  Goal: Absence or prevention of progression during hospitalization  Description: INTERVENTIONS:  - Assess and monitor for signs and symptoms of infection  - Monitor lab/diagnostic results  - Monitor all insertion sites, i e  indwelling lines, tubes, and drains  - Monitor endotracheal if appropriate and nasal secretions for changes in amount and color  - [place] appropriate cooling/warming therapies per order  - Administer medications as ordered  - Instruct and encourage patient and family to use good hand hygiene technique  - Identify and instruct in appropriate isolation precautions for identified infection/condition  Outcome: Progressing     Problem: SAFETY ADULT  Goal: Patient will remain free of falls  Description: INTERVENTIONS:  - Educate patient/family on patient safety including physical limitations  - Instruct patient to call for assistance with activity   - Consult OT/PT to assist with strengthening/mobility   - Keep Call bell within reach  - Keep bed low and locked with side rails adjusted as appropriate  - Keep care items and personal belongings within reach  - Initiate and maintain comfort rounds  - Make Fall Risk Sign visible to staff  - Offer Toileting every Hours, in advance of need  - Initiate/Maintain alarm  - Obtain necessary fall risk management equipment  - Apply yellow socks and bracelet for high fall risk patients  - Consider moving patient to room near nurses station  Outcome: Progressing  Goal: Maintain or return to baseline ADL function  Description: INTERVENTIONS:  -  Assess patient's ability to carry out ADLs; assess patient's baseline for ADL function and identify physical deficits which impact ability to perform ADLs (bathing, care of mouth/teeth, toileting, grooming, dressing, etc )  - Assess/evaluate cause of self-care deficits   - Assess range of motion  - Assess patient's mobility; develop plan if impaired  - Assess patient's need for assistive devices and provide as appropriate  - Encourage maximum independence but intervene and supervise when necessary  - Involve family in performance of ADLs  - Assess for home care needs following discharge   - Consider OT consult to assist with ADL evaluation and planning for discharge  - Provide patient education as appropriate  Outcome: Progressing  Goal: Maintains/Returns to pre admission functional level  Description: INTERVENTIONS:  - Perform BMAT or MOVE assessment daily    - Set and communicate daily mobility goal to care team and patient/family/caregiver  - Collaborate with rehabilitation services on mobility goals if consulted  - Perform Range of Mot  - Out of bed for toileting  - Record patient progress and toleration of activity level   Outcome: Progressing     Problem: DISCHARGE PLANNING  Goal: Discharge to home or other facility with appropriate resources  Description: INTERVENTIONS:  - Identify barriers to discharge w/patient and caregiver  - Arrange for needed discharge resources and transportation as appropriate  - Identify discharge learning needs (meds, wound care, etc )  - Arrange for interpretive services to assist at discharge as needed  - Refer to Case Management Department for coordinating discharge planning if the patient needs post-hospital services based on physician/advanced practitioner order or complex needs related to functional status, cognitive ability, or social support system  Outcome: Progressing     Problem: Nutrition/Hydration-ADULT  Goal: Nutrient/Hydration intake appropriate for improving, restoring or maintaining nutritional needs  Description: Monitor and assess patient's nutrition/hydration status for malnutrition  Collaborate with interdisciplinary team and initiate plan and interventions as ordered    Monitor patient's weight and dietary intake as ordered or per policy  Utilize nutrition screening tool and intervene as necessary  Determine patient's food preferences and provide high-protein, high-caloric foods as appropriate       INTERVENTIONS:  - Monitor oral intake, urinary output, labs, and treatment plans  - Assess nutrition and hydration status and recommend course of action  - Evaluate amount of meals eaten  - Assist patient with eating if necessary   - Allow adequate time for meals  - Recommend/ encourage appropriate diets, oral nutritional supplements, and vitamin/mineral supplements  - Order, calculate, and assess calorie counts as needed  - Recommend, monitor, and adjust tube feedings and TPN/PPN based on assessed needs  - Assess need for intravenous fluids  - Provide specific nutrition/hydration education as appropriate  - Include patient/family/caregiver in decisions related to nutrition  Outcome: Progressing     Problem: MOBILITY - ADULT  Goal: Maintain or return to baseline ADL function  Description: INTERVENTIONS:  -  Assess patient's ability to carry out ADLs; assess patient's baseline for ADL function and identify physical deficits which impact ability to perform ADLs (bathing, care of mouth/teeth, toileting, grooming, dressing, etc )  - Assess/evaluate cause of self-care deficits   - Assess range of motion  - Assess patient's mobility; develop plan if impaired  - Assess patient's need for assistive devices and provide as appropriate  - Encourage maximum independence but intervene and supervise when necessary  - Involve family in performance of ADLs  - Assess for home care needs following discharge   - Consider OT consult to assist with ADL evaluation and planning for discharge  - Provide patient education as appropriate  Outcome: Progressing  Goal: Maintains/Returns to pre admission functional level  Description: INTERVENTIONS:  - Perform BMAT or MOVE assessment daily    - Set and communicate daily mobility goal to care team and patient/family/caregiver     - Collaborate with rehabilitation services on mobility goals if consulted  - Perform Range of  - Out of bed for toileting  - Record patient progress and toleration of activity level   Outcome: Progressing

## 2022-12-26 NOTE — PROGRESS NOTES
Progress Note - Infectious Disease   Raissa Petty 61 y o  male MRN: [id number]  Unit/Bed#: -01 Encounter: [id number]      Impression/Recommendations:  Sepsis     Evolving 12/17:  WBC, HR   Suspect due to persistent left intra-abdominal infection in setting of complex history below, recently completed antibiotics   ROS and exam otherwise negative   Recent Flu/RSV/COVID PCR, CXR negative   Blood cultures negative   Improved with reinitiation of antibiotics, additional drain placement    Rec:  • Continue antibiotics as below  • Drains as below  • Follow temperatures and WBC closely  • Supportive care as per the primary service     Intra-abdominal abscess     In the setting complex surgical history as below   Initially developed 11/2022 companied by ESBL E  coli bacteremia   Status post exploratory laparotomy, right hemicolectomy, temporary abdominal closure 11/16; re-exploration, partial left colectomy, primary ileocolonic anastomosis with proximal diverting loop ileostomy, midline fascial closure on 11/17   More recently developed abscess in hepatectomy bed and collection +/- leak at area of prior colonic anastamosis, 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   Status post IR drain into perihepatic collection 12/8   Cultures with ESBL E  Coli   Status post 7 days ertapenem after drain placement through 12/15   Repeat CT 12/17 shows hepatic bed collection improved, persistent left intra-abdominal collection   Status post perigastic, perisplenic drains 12/20   Cultures again with ESBL E  coli  Rec:  • Continue ertapenem for 4 more days than transition to PO doxycycline  • Drains per IR - if outputs remain <10 cc, request tube check     Possible acute cholecystitis     Status post percutaneous cholecystostomy tube   Alk phos remains markedly elevated, possibly due to drain itself versus known intrahepatic metastases      Metastatic colon cancer   To liver, possible lung   Status post resection, chemotherapy, more recent hepatic resection and ablation, transverse colon resection      CKD   Baseline Cr 1 4     DM      Anemia   Status post multiple transfusions      The above impression and plan was discussed in detail with the patient      Antibiotics:  Ertapenem #10    Subjective:  Patient seen on AM rounds  Feels hot at night  Pain in LUQ improved but worse at night  Worried about using oxycodone too much  24 Hour Events:  No documented fevers, chills, sweats, nausea, vomiting, or diarrhea  Some intermittent LGTs noted  Objective:  Vitals:  Temp:  [97 7 °F (36 5 °C)-100 1 °F (37 8 °C)] 98 2 °F (36 8 °C)  HR:  [] 94  Resp:  [18-20] 18  BP: (110-132)/(61-82) 113/78  SpO2:  [96 %-98 %] 98 %  Temp (24hrs), Av 7 °F (37 1 °C), Min:97 7 °F (36 5 °C), Max:100 1 °F (37 8 °C)  Current: Temperature: 98 2 °F (36 8 °C)    Physical Exam:   General:  No acute distress  Psychiatric:  Awake and alert  Pulmonary:  Normal respiratory excursion without accessory muscle use  Abdomen:  Soft, nontender  Extremities:  No edema  Skin:  No rashes    Lab Results:  I have personally reviewed pertinent labs    Results from last 7 days   Lab Units 22  0512 22  0827 22  0552 22  0748   POTASSIUM mmol/L 4 1  --  4 0 4 0   CHLORIDE mmol/L 109*  --  110* 108   CO2 mmol/L 22  --  23 23   BUN mg/dL 25  --  17 18   CREATININE mg/dL 1 41*  --  1 22 1 28   EGFR ml/min/1 73sq m 54  --  64 61   CALCIUM mg/dL 8 3  --  9 0 8 7   AST U/L 51* 74* 56* 57*   ALT U/L 16 19 17 17   ALK PHOS U/L 983* 955* 1,096* 1,093*     Results from last 7 days   Lab Units 22  0512 22  1057 22  1054 22  0637   WBC Thousand/uL 12 30* 12 33*  --  13 22*   HEMOGLOBIN g/dL 7 3* 7 4* 8 3* 7 6*   PLATELETS Thousands/uL 373 365  --  309     Results from last 7 days   Lab Units 22  1944 22  1906   GRAM STAIN RESULT  2+ Polys  No bacteria seen 1+ Gram negative rods*  1+ Gram positive rods*  2+ Polys*   BODY FLUID CULTURE, STERILE  2+ Growth of Escherichia coli ESBL* 2+ Growth of Escherichia coli ESBL*  2+ Growth of Enterococcus faecalis*       Imaging Studies:   I have personally reviewed pertinent imaging study reports and images in PACS  EKG, Pathology, and Other Studies:   I have personally reviewed pertinent reports

## 2022-12-26 NOTE — PROGRESS NOTES
Internal Medicine Progress Note  Patient: Alena Carrington  Age/sex: 61 y o  male  Medical Record #: [id number]      ASSESSMENT/PLAN: (Interval History)  Alena Carrington is seen and examined and management for following issues:    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  • s/p hepatic resection and reversal of colostomy on 11/7  • Return to the OR 11/16 for right hemicolectomy with partial descending colectomy colocolonic anastomosis with loop ileostomy and mid line abdominal VAC placement  • Continue local wound care; WC following  • Ileostomy output stable  • CT  Abd/pelvis 12/17 = loculated collection along splenic recess   Has perisplenic hilum collection as well --> to IR 12/20 for drain placement in both perigastric and perisplenic area and cx sent from both areas = Ecoli  • Flush drains with 10ml qd; tube check 2 weeks 1/3/23  • Surgery following and monitoring drain output      Acute cholecystitis  • s/p percutaneous tube placement 12/8 with tube check on 12/12 and 12/14  • GI following due to LFT elevation = felt alkaline phosphatase elevated 2/2 infiltrative disease rather than focal biliary obstruction   AMA was negative     • Alkaline phosphatase isoenzyme sent 12/17 = GI aware that has resulted  They recommended continuing to monitor his LFTs and defer to surgical oncology for further w/u and plan  • LFT seem to be plateuing  • Consider MRCP if total bili trends back up  • Check lft's mondays and thursdays     Bacteremia/abdominal abscess  • ID following and continuing Ertapenem through the weekend    • Await further ID f/u  • Blood cultures negative    • CT abdomen and pelvis as above     Hypertension  • On Norvasc 5mg BID  • stable     Diabetes mellitus  • On Lantus 12U qhs/Lispro 6U TID  • Continue diabetic diet and QID Accuchecks with SSI  • BS stable     Acute on chronic renal failure  • Stage III; baseline 1 2-1 4  • Lisinopril currently on hold  • Will give IVF x 1 liter per surgery reccs     Anemia  • Multifactorial due to recent infection, surgery, CKD stage III  • Transfused on 12/15 for hemoglobin 7 3 and 12/16/22 for hgb of 8   • hgb 7 3 12/26  • No transfusion for now per pt request  • He is agreeable if it drops below 7     Atypical chest pain  • With elevation in troponin/EKG changes  • Cardiology cleared pt for discharge  • Did not recommend any further work up  • On 12/15 had CP = Cards saw and felt was M/S  • No further chest pain     Situational depression  • Neuropsychology consulted  • Patient not interested in medications at this point in time     Malnutrition  • Recommend dietician consultation to optimize wound healing  • Glucerna makes him nauseated  • On 12/15, ordered double protein  • He will bring supplement from home     Pleural effusion  • CXR on 12/14 showed small left pleural effusion and was suspect for CHF  • ECHO 11/9/22 = LVEF 55%, unable to assess diastolic function, mild TR  • asymptomatic  • stable        Discharge date:  Team       The above assessment and plan was reviewed and updated as determined by my evaluation of the patient on 12/26/2022      Labs:   Results from last 7 days   Lab Units 12/26/22  0512 12/24/22  1057   WBC Thousand/uL 12 30* 12 33*   HEMOGLOBIN g/dL 7 3* 7 4*   HEMATOCRIT % 22 8* 24 8*   PLATELETS Thousands/uL 373 365     Results from last 7 days   Lab Units 12/26/22  0512 12/22/22  0552   SODIUM mmol/L 138 140   POTASSIUM mmol/L 4 1 4 0   CHLORIDE mmol/L 109* 110*   CO2 mmol/L 22 23   BUN mg/dL 25 17   CREATININE mg/dL 1 41* 1 22   CALCIUM mg/dL 8 3 9 0             Results from last 7 days   Lab Units 12/26/22  0624 12/25/22  2124 12/25/22  1720   POC GLUCOSE mg/dl 174* 111 168*       Review of Scheduled Meds:  Current Facility-Administered Medications   Medication Dose Route Frequency Provider Last Rate   • acetaminophen  975 mg Oral Q8H [facility] GAURANG Keith     • amLODIPine  5 mg Oral BID GAURANG Keith     • aspirin  81 mg Oral Daily GAURANG Keith     • atorvastatin  40 mg Oral Daily GAURANG Keith     • calcium carbonate  500 mg Oral BID PRN GAURANG Bassett     • cholecalciferol  400 Units Oral Daily GAURANG Keith     • ertapenem  1,000 mg Intravenous Q24H Jesse Ernst MD 1,000 mg (12/25/22 1941)   • heparin (porcine)  5,000 Units Subcutaneous Q8H [facility] GAURANG Keith     • insulin glargine  12 Units Subcutaneous HS GAURANG Green     • insulin lispro  1-5 Units Subcutaneous HS GAURANG Keith     • insulin lispro  1-6 Units Subcutaneous TID AC GAURANG Keith     • insulin lispro  6 Units Subcutaneous TID With Meals GAURANG Green     • iohexol  50 mL Oral 90 min pre-procedure Jesse Ernst MD     • melatonin  3 mg Oral HS GAURANG Keith     • methocarbamol  500 mg Oral BID GAURANG Bassett     • multivitamin-minerals  1 tablet Oral Daily GAURANG Keith     • ondansetron  4 mg Oral Q6H PRN GAURANG Bassett     • oxyCODONE  10 mg Oral Q6H PRN GAURANG Bassett     • oxyCODONE  2 5 mg Oral Q3H PRN GAURANG Bassett     • oxyCODONE  5 mg Oral Q6H PRN GAURANG Keith     • pantoprazole  40 mg Oral BID AC GAURANG Keith     • simethicone  80 mg Oral 4x Daily (with meals and at bedtime) GAURANG Bassett     • tamsulosin  0 4 mg Oral Daily With Dinner GAURANG Bassett         Subjective/ HPI: Patient seen and examined  Patients overnight issues or events were reviewed with nursing or staff during rounds or morning huddle session  New or overnight issues include the following:     Pt seen in therapy  No complaints overnight  Denies any dizziness or lightheadedness at present  Agreeable to IVF but would like to hold off on blood for now  ROS:   A 10 point ROS was performed; negative except as noted above        *Labs /Radiology studies reviewed  *Medications reviewed and reconciled as needed  *Please refer to order section for additional ordered labs studies  *Case discussed with primary attending during morning huddle case rounds    Physical Examination:  Vitals:   Vitals:    12/25/22 1755 12/25/22 2142 12/26/22 0546 12/26/22 0809   BP: 110/82 125/61 127/71 113/78   BP Location:  Left arm Left arm    Pulse:  104 84 94   Resp:  20 18    Temp:  100 1 °F (37 8 °C) 98 2 °F (36 8 °C)    TempSrc:  Oral Oral    SpO2:  96% 98%    Weight:       Height:           GEN: No apparent distress, interactive  NEURO: Alert and oriented x3  HEENT: Pupils are equal and reactive, EOMI, mucous membranes are moist, face symmetrical  CV: S1 S2 regular, no MRG, no peripheral edema noted  RESP: Lungs are clear bilaterally, no wheezes, rales or rhonchi noted, on room air, respirations easy and non labored; poor effort  GI: obese, soft non tender, non distended; +BS x4; choley drain with dark brown drainage, the other drains with milky white drainage; ileostomy with loose brown stool; outputs reviewed and stable  : Voiding without difficulty  MUSC: Moves all extremities; generalized deconditioning  SKIN: pink, warm and dry, normal turgor, no rashes, lesions; abdominal incisions refer to media images, no evidence of infection          The above physical exam was reviewed and updated as determined by my evaluation of the patient on 12/26/2022  Invasive Devices     Central Venous Catheter Line  Duration           Port A Cath 03/10/22 Right Chest 290 days          Drain  Duration           Ileostomy LUQ 38 days    Abscess Drain Abdomen 17 days    Cholecystostomy Tube 13 days    Abscess Drain Abdomen 5 days    Abscess Drain Back 5 days                   VTE Pharmacologic Prophylaxis: Heparin  Code Status: Level 1 - Full Code  Current Length of Stay: 12 day(s)      Total time spent:  30 minutes with more than 50% spent counseling/coordinating care  Counseling includes discussion with patient re: progress  and discussion with patient of his/her current medical state/information  Coordination of patient's care was performed in conjunction with primary service  Time invested included review of patient's labs, vitals, and management of their comorbidities with continued monitoring  In addition, this patient was discussed with medical team including physician and advanced extenders  The care of the patient was extensively discussed and appropriate treatment plan was formulated unique for this patient  Medical decision making for the day was made by supervising physician unless otherwise noted in their attestation statement  ** Please Note:  voice to text software may have been used in the creation of this document   Although proof errors in transcription or interpretation are a potential of such software**

## 2022-12-26 NOTE — PROGRESS NOTES
12/26/22 1330   Pain Assessment   Pain Assessment Tool 0-10   Pain Score No Pain   Restrictions/Precautions   Precautions Fall Risk;Contact/isolation;Multiple lines;Supervision on toilet/commode  (VICTORINO drain, ileostomy, IR drain)   Weight Bearing Restrictions No   ROM Restrictions No   Sit to Lying   Type of Assistance Needed Physical assistance   Physical Assistance Level 76% or more   Comment A for repositioniong and BLEs this session due to fatigue  Use of bedrail and HOB slightly elevated  Sit to Lying CARE Score 2   Sit to Stand   Type of Assistance Needed Physical assistance   Physical Assistance Level 26%-50%   Comment improved carryover of technique this session, min/modA to boost initially from sit to stand   Sit to Stand CARE Score 3   Bed-Chair Transfer   Type of Assistance Needed Physical assistance   Physical Assistance Level 26%-50%   Comment RW   Chair/Bed-to-Chair Transfer CARE Score 3   Functional Standing Tolerance   Time 3 minutes   Activity Static standing at EOB to improve standing tolerance and act julia  Overall CGA while in stance with BUE support on RW  Cognition   Overall Cognitive Status WFL   Arousal/Participation Cooperative   Attention Attends with cues to redirect   Orientation Level Oriented X4   Memory Within functional limits   Following Commands Follows one step commands with increased time or repetition   Activity Tolerance   Activity Tolerance Patient limited by fatigue;Patient limited by pain   Assessment   Treatment Assessment Pt participated in skilled OT services with focus on functional txfers, standing tolerance/balance, and grooming  Pt continues to be limited by dec endurance, dec strength, dec standing balance/tolerance, dec act julia  Pt continues to be motivated to participate to his tolerance  Pt will continue to benefit from skilled OT services with focus on above mentioned deficits to inc safety and independence with I/ADL tasks     Prognosis Fair   Problem List Decreased strength;Decreased range of motion;Decreased endurance; Impaired balance;Decreased mobility;Pain   Plan   Treatment/Interventions ADL retraining;Functional transfer training; Therapeutic exercise; Endurance training;Patient/family training;Bed mobility; Equipment eval/education   Progress Progressing toward goals   Recommendation   OT Discharge Recommendation   (home with family support)   OT Therapy Minutes   OT Time In 1330   OT Time Out 1430   OT Total Time (minutes) 60   OT Mode of treatment - Individual (minutes) 60   OT Mode of treatment - Concurrent (minutes) 0   OT Mode of treatment - Group (minutes) 0   OT Mode of treatment - Co-treat (minutes) 0   OT Mode of Treatment - Total time(minutes) 60 minutes   OT Cumulative Minutes 780   Therapy Time missed   Time missed?  No

## 2022-12-26 NOTE — PROGRESS NOTES
12/26/22 1230   Pain Assessment   Pain Assessment Tool 0-10   Pain Score No Pain   Restrictions/Precautions   Precautions Fall Risk;Contact/isolation;Pain;Multiple lines;Supervision on toilet/commode  (VICTORINO drain, ileostomy, IR drain)   Weight Bearing Restrictions No   ROM Restrictions No   Cognition   Overall Cognitive Status WFL   Arousal/Participation Cooperative   Attention Attends with cues to redirect   Orientation Level Oriented X4   Memory Within functional limits   Following Commands Follows one step commands with increased time or repetition   Lying to Sitting on Side of Bed   Type of Assistance Needed Supervision; Adaptive equipment   Comment CS with heavy reliance on rail, HOB elevated   Lying to Sitting on Side of Bed CARE Score 4   Sit to Stand   Type of Assistance Needed Physical assistance;Verbal cues; Adaptive equipment   Physical Assistance Level 26%-50%   Comment from EOB CGA, from GHANSHYAM Nichols 23 MIN to MODA   Sit to Stand CARE Score 3   Bed-Chair Transfer   Type of Assistance Needed Physical assistance;Verbal cues; Adaptive equipment   Physical Assistance Level 26%-50%   Comment with RW   Chair/Bed-to-Chair Transfer CARE Score 3   Transfer Bed/Chair/Wheelchair   Adaptive Equipment Roller Walker   Car Transfer   Comment (S)  trial next session   Walk 10 Feet   Type of Assistance Needed Physical assistance;Verbal cues; Adaptive equipment   Physical Assistance Level 26%-50%   Comment MARTY with RW   Walk 10 Feet CARE Score 3   Walk 50 Feet with Two Turns   Type of Assistance Needed Physical assistance;Verbal cues; Adaptive equipment   Physical Assistance Level Total assistance   Comment MARTY with CF for distance   Walk 50 Feet with Two Turns CARE Score 1   Walk 150 Feet   Reason if not Attempted Safety concerns   Walk 150 Feet CARE Score 88   Walking 10 Feet on Uneven Surfaces   Reason if not Attempted Safety concerns   Walking 10 Feet on Uneven Surfaces CARE Score 88   Ambulation   Primary Mode of Locomotion Prior to Admission Walk   Distance Walked (feet) 100 ft  (120')   Assist Device Roller Walker   Gait Pattern Inconsistant Josefina; Slow Josefina;Decreased foot clearance;Shuffle   Limitations Noted In Coordination;Device Management; Endurance; Heel Strike;Midline Orientation; Safety;Speed;Strength   Provided Assistance with: Balance   Walk Assist Level Minimum Assist;Chair Follow   Does the patient walk? 2  Yes   Wheel 50 Feet with Two Turns   Reason if not Attempted Activity not applicable   Wheel 50 Feet with Two Turns CARE Score 9   Wheel 150 Feet   Reason if not Attempted Activity not applicable   Wheel 832 Feet CARE Score 9   Wheelchair mobility   Does the patient use a wheelchair? 0  No   Curb or Single Stair   Comment (S)  trial in next few session  Reason if not Attempted Safety concerns   1 Step (Curb) CARE Score 88   4 Steps   Reason if not Attempted Safety concerns   4 Steps CARE Score 88   12 Steps   Reason if not Attempted Safety concerns   12 Steps CARE Score 88   Picking Up Object   Reason if not Attempted Safety concerns   Picking Up Object CARE Score 88   Assessment   Treatment Assessment Pt participated in brief 30 min session with increased focus on gait  Utilization of chair follow for long distance amb this session  Pt noted increased act tolerance and with no c/o pain  Pt set up end of session in recliner for lunch and PCA asked to empty ileostomy bag  Pt will cont to benefit from increased act tolerance, increased gait, increased stair negotiation as able and improved functional transfers to decrease burden of care  Problem List Decreased strength;Decreased range of motion;Decreased endurance; Impaired balance;Decreased mobility;Pain   Barriers to Discharge Inaccessible home environment;Decreased caregiver support   PT Barriers   Functional Limitation Car transfers; Ramp negotiation;Standing;Transfers; Walking;Stair negotiation; Wheelchair management   Plan   Treatment/Interventions Functional transfer training;LE strengthening/ROM; Endurance training; Therapeutic exercise; Bed mobility;Gait training   Progress Progressing toward goals   Recommendation   PT Discharge Recommendation Home with home health rehabilitation   PT Therapy Minutes   PT Time In 1230   PT Time Out 1300   PT Total Time (minutes) 30   PT Mode of treatment - Individual (minutes) 30   PT Mode of treatment - Concurrent (minutes) 0   PT Mode of treatment - Group (minutes) 0   PT Mode of treatment - Co-treat (minutes) 0   PT Mode of Treatment - Total time(minutes) 30 minutes   PT Cumulative Minutes 650   Therapy Time missed   Time missed?  No

## 2022-12-27 PROBLEM — L02.91 ABSCESS: Status: ACTIVE | Noted: 2022-12-27

## 2022-12-27 PROBLEM — N18.9 ACUTE ON CHRONIC KIDNEY FAILURE (HCC): Status: ACTIVE | Noted: 2022-12-14

## 2022-12-27 PROBLEM — R52 ACUTE PAIN: Status: ACTIVE | Noted: 2022-12-27

## 2022-12-27 LAB
ALBUMIN SERPL BCP-MCNC: 1.7 G/DL (ref 3.5–5)
ALP SERPL-CCNC: 1072 U/L (ref 46–116)
ALT SERPL W P-5'-P-CCNC: 18 U/L (ref 12–78)
ANION GAP SERPL CALCULATED.3IONS-SCNC: 9 MMOL/L (ref 4–13)
AST SERPL W P-5'-P-CCNC: 51 U/L (ref 5–45)
BASOPHILS # BLD AUTO: 0.06 THOUSANDS/ÂΜL (ref 0–0.1)
BASOPHILS NFR BLD AUTO: 0 % (ref 0–1)
BILIRUB SERPL-MCNC: 1.04 MG/DL (ref 0.2–1)
BUN SERPL-MCNC: 23 MG/DL (ref 5–25)
CALCIUM ALBUM COR SERPL-MCNC: 10.6 MG/DL (ref 8.3–10.1)
CALCIUM SERPL-MCNC: 8.8 MG/DL (ref 8.3–10.1)
CHLORIDE SERPL-SCNC: 110 MMOL/L (ref 96–108)
CO2 SERPL-SCNC: 17 MMOL/L (ref 21–32)
CREAT SERPL-MCNC: 1.54 MG/DL (ref 0.6–1.3)
EOSINOPHIL # BLD AUTO: 0 THOUSAND/ÂΜL (ref 0–0.61)
EOSINOPHIL NFR BLD AUTO: 0 % (ref 0–6)
ERYTHROCYTE [DISTWIDTH] IN BLOOD BY AUTOMATED COUNT: 17.7 % (ref 11.6–15.1)
GFR SERPL CREATININE-BSD FRML MDRD: 49 ML/MIN/1.73SQ M
GLUCOSE SERPL-MCNC: 117 MG/DL (ref 65–140)
GLUCOSE SERPL-MCNC: 128 MG/DL (ref 65–140)
GLUCOSE SERPL-MCNC: 129 MG/DL (ref 65–140)
GLUCOSE SERPL-MCNC: 169 MG/DL (ref 65–140)
GLUCOSE SERPL-MCNC: 174 MG/DL (ref 65–140)
GLUCOSE SERPL-MCNC: 183 MG/DL (ref 65–140)
HCT VFR BLD AUTO: 24.7 % (ref 36.5–49.3)
HGB BLD-MCNC: 7.7 G/DL (ref 12–17)
IMM GRANULOCYTES # BLD AUTO: 0.19 THOUSAND/UL (ref 0–0.2)
IMM GRANULOCYTES NFR BLD AUTO: 1 % (ref 0–2)
LYMPHOCYTES # BLD AUTO: 1.34 THOUSANDS/ÂΜL (ref 0.6–4.47)
LYMPHOCYTES NFR BLD AUTO: 10 % (ref 14–44)
MCH RBC QN AUTO: 27 PG (ref 26.8–34.3)
MCHC RBC AUTO-ENTMCNC: 31.2 G/DL (ref 31.4–37.4)
MCV RBC AUTO: 87 FL (ref 82–98)
MONOCYTES # BLD AUTO: 1.16 THOUSAND/ÂΜL (ref 0.17–1.22)
MONOCYTES NFR BLD AUTO: 8 % (ref 4–12)
NEUTROPHILS # BLD AUTO: 11.23 THOUSANDS/ÂΜL (ref 1.85–7.62)
NEUTS SEG NFR BLD AUTO: 81 % (ref 43–75)
NRBC BLD AUTO-RTO: 0 /100 WBCS
PLATELET # BLD AUTO: 515 THOUSANDS/UL (ref 149–390)
PMV BLD AUTO: 9.6 FL (ref 8.9–12.7)
POTASSIUM SERPL-SCNC: 3.9 MMOL/L (ref 3.5–5.3)
PROT SERPL-MCNC: 8.8 G/DL (ref 6.4–8.4)
RBC # BLD AUTO: 2.85 MILLION/UL (ref 3.88–5.62)
SODIUM SERPL-SCNC: 136 MMOL/L (ref 135–147)
WBC # BLD AUTO: 13.98 THOUSAND/UL (ref 4.31–10.16)

## 2022-12-27 RX ADMIN — SIMETHICONE 80 MG: 80 TABLET, CHEWABLE ORAL at 14:25

## 2022-12-27 RX ADMIN — HEPARIN SODIUM 5000 UNITS: 5000 INJECTION INTRAVENOUS; SUBCUTANEOUS at 14:25

## 2022-12-27 RX ADMIN — SIMETHICONE 80 MG: 80 TABLET, CHEWABLE ORAL at 08:54

## 2022-12-27 RX ADMIN — OXYCODONE HYDROCHLORIDE 10 MG: 10 TABLET ORAL at 21:15

## 2022-12-27 RX ADMIN — HEPARIN SODIUM 5000 UNITS: 5000 INJECTION INTRAVENOUS; SUBCUTANEOUS at 05:49

## 2022-12-27 RX ADMIN — TAMSULOSIN HYDROCHLORIDE 0.4 MG: 0.4 CAPSULE ORAL at 17:25

## 2022-12-27 RX ADMIN — ASPIRIN 81 MG CHEWABLE TABLET 81 MG: 81 TABLET CHEWABLE at 08:54

## 2022-12-27 RX ADMIN — CHOLECALCIFEROL TAB 10 MCG (400 UNIT) 400 UNITS: 10 TAB at 08:55

## 2022-12-27 RX ADMIN — ACETAMINOPHEN 975 MG: 325 TABLET, FILM COATED ORAL at 21:16

## 2022-12-27 RX ADMIN — PANTOPRAZOLE SODIUM 40 MG: 40 TABLET, DELAYED RELEASE ORAL at 06:11

## 2022-12-27 RX ADMIN — ACETAMINOPHEN 975 MG: 325 TABLET, FILM COATED ORAL at 05:48

## 2022-12-27 RX ADMIN — Medication 1 TABLET: at 08:54

## 2022-12-27 RX ADMIN — INSULIN GLARGINE 12 UNITS: 100 INJECTION, SOLUTION SUBCUTANEOUS at 21:15

## 2022-12-27 RX ADMIN — HEPARIN SODIUM 5000 UNITS: 5000 INJECTION INTRAVENOUS; SUBCUTANEOUS at 21:16

## 2022-12-27 RX ADMIN — MELATONIN TAB 3 MG 3 MG: 3 TAB at 21:16

## 2022-12-27 RX ADMIN — INSULIN LISPRO 1 UNITS: 100 INJECTION, SOLUTION INTRAVENOUS; SUBCUTANEOUS at 14:29

## 2022-12-27 RX ADMIN — SIMETHICONE 80 MG: 80 TABLET, CHEWABLE ORAL at 17:25

## 2022-12-27 RX ADMIN — INSULIN LISPRO 6 UNITS: 100 INJECTION, SOLUTION INTRAVENOUS; SUBCUTANEOUS at 14:29

## 2022-12-27 RX ADMIN — INSULIN LISPRO 6 UNITS: 100 INJECTION, SOLUTION INTRAVENOUS; SUBCUTANEOUS at 17:26

## 2022-12-27 RX ADMIN — CALCIUM CARBONATE (ANTACID) CHEW TAB 500 MG 500 MG: 500 CHEW TAB at 21:16

## 2022-12-27 RX ADMIN — OXYCODONE HYDROCHLORIDE 10 MG: 10 TABLET ORAL at 05:52

## 2022-12-27 RX ADMIN — ATORVASTATIN CALCIUM 40 MG: 40 TABLET, FILM COATED ORAL at 08:54

## 2022-12-27 RX ADMIN — METHOCARBAMOL 500 MG: 500 TABLET ORAL at 17:25

## 2022-12-27 RX ADMIN — METHOCARBAMOL 500 MG: 500 TABLET ORAL at 08:54

## 2022-12-27 RX ADMIN — PANTOPRAZOLE SODIUM 40 MG: 40 TABLET, DELAYED RELEASE ORAL at 17:25

## 2022-12-27 RX ADMIN — SIMETHICONE 80 MG: 80 TABLET, CHEWABLE ORAL at 21:16

## 2022-12-27 RX ADMIN — AMLODIPINE BESYLATE 5 MG: 5 TABLET ORAL at 08:54

## 2022-12-27 RX ADMIN — ERTAPENEM SODIUM 1000 MG: 1 INJECTION, POWDER, LYOPHILIZED, FOR SOLUTION INTRAMUSCULAR; INTRAVENOUS at 19:30

## 2022-12-27 RX ADMIN — AMLODIPINE BESYLATE 5 MG: 5 TABLET ORAL at 17:25

## 2022-12-27 RX ADMIN — ACETAMINOPHEN 975 MG: 325 TABLET, FILM COATED ORAL at 14:25

## 2022-12-27 NOTE — PROGRESS NOTES
Pastoral Care Progress Note    2022  Patient: Emilee Mccartney : 1965  Admission Date & Time: 2022 1518  MRN: [id number] CSN: [id number]        Patient finally welcomed me into the room today if for only a brief visit  Intro'd myself, let him know I'd be glad to serve him in any way possible  He expressed sincere thanks

## 2022-12-27 NOTE — PROGRESS NOTES
Progress Note - Infectious Disease   Norma Tabor 61 y o  male MRN: [id number]  Unit/Bed#: -01 Encounter: [id number]      Impression/Recommendations:  Sepsis     Evolving 12/17:  WBC, HR   Suspect due to persistent left intra-abdominal infection in setting of complex history below, recently completed antibiotics   ROS and exam otherwise negative   Recent Flu/RSV/COVID PCR, CXR negative   Blood cultures negative   Improved with reinitiation of antibiotics, additional drain placement    Rec:  • Continue antibiotics as below  • Drains as below  • Follow temperatures and WBC closely  • Supportive care as per the primary service     Intra-abdominal abscess     In the setting complex surgical history as below   Initially developed 11/2022 companied by ESBL E  coli bacteremia   Status post exploratory laparotomy, right hemicolectomy, temporary abdominal closure 11/16; re-exploration, partial left colectomy, primary ileocolonic anastomosis with proximal diverting loop ileostomy, midline fascial closure on 11/17   More recently developed abscess in hepatectomy bed and collection +/- leak at area of prior colonic anastamosis, 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   Status post IR drain into perihepatic collection 12/8   Cultures with ESBL E  Coli   Status post 7 days ertapenem after drain placement through 12/15   Repeat CT 12/17 shows hepatic bed collection improved, persistent left intra-abdominal collection   Status post perigastic, perisplenic drains 12/20   Cultures again with ESBL E  coli  Rec:  • Continue ertapenem for 3 more days than transition to PO doxycycline  • Drains per IR - if outputs remain <10 cc, request tube check     Possible acute cholecystitis     Status post percutaneous cholecystostomy tube   Alk phos remains markedly elevated, possibly due to drain itself versus known intrahepatic metastases      Metastatic colon cancer   To liver, possible lung   Status post resection, chemotherapy, more recent hepatic resection and ablation, transverse colon resection      CKD   Baseline Cr 1 4     DM      Anemia   Status post multiple transfusions      Antibiotics:  Ertapenem #11    Subjective:  Patient seen on AM rounds  Sleeping and not awakened  24 Hour Events:  No documented fevers, nausea, vomiting, or diarrhea  Described sweats to IM team earlier this AM   Increased in drain output  Objective:  Vitals:  Temp:  [98 °F (36 7 °C)-98 8 °F (37 1 °C)] 98 4 °F (36 9 °C)  HR:  [] 105  Resp:  [16-18] 18  BP: (116-168)/(72-76) 168/73  SpO2:  [96 %-99 %] 98 %  Temp (24hrs), Av 4 °F (36 9 °C), Min:98 °F (36 7 °C), Max:98 8 °F (37 1 °C)  Current: Temperature: 98 4 °F (36 9 °C)    Physical Exam:   General:  No acute distress  Psychiatric:  Sleeping  Pulmonary:  Normal respiratory excursion without accessory muscle use  Abdomen:  Soft, obese  Extremities:  No edema  Skin:  No rashes    Lab Results:  I have personally reviewed pertinent labs    Results from last 7 days   Lab Units 22  0512 22  0827 22  0552 22  0748   POTASSIUM mmol/L 4 1  --  4 0 4 0   CHLORIDE mmol/L 109*  --  110* 108   CO2 mmol/L 22  --  23 23   BUN mg/dL 25  --  17 18   CREATININE mg/dL 1 41*  --  1 22 1 28   EGFR ml/min/1 73sq m 54  --  64 61   CALCIUM mg/dL 8 3  --  9 0 8 7   AST U/L 51* 74* 56* 57*   ALT U/L 16 19 17 17   ALK PHOS U/L 983* 955* 1,096* 1,093*     Results from last 7 days   Lab Units 22  0512 22  1057 22  1054 22  0637   WBC Thousand/uL 12 30* 12 33*  --  13 22*   HEMOGLOBIN g/dL 7 3* 7 4* 8 3* 7 6*   PLATELETS Thousands/uL 373 365  --  309     Results from last 7 days   Lab Units 22  1944 22  1906   GRAM STAIN RESULT  2+ Polys  No bacteria seen 1+ Gram negative rods*  1+ Gram positive rods*  2+ Polys*   BODY FLUID CULTURE, STERILE  2+ Growth of Escherichia coli ESBL* 2+ Growth of Escherichia coli ESBL*  2+ Growth of Enterococcus faecalis* Imaging Studies:   I have personally reviewed pertinent imaging study reports and images in PACS  EKG, Pathology, and Other Studies:   I have personally reviewed pertinent reports

## 2022-12-27 NOTE — ASSESSMENT & PLAN NOTE
· Abdominal abscess- ESBL E Coli - Contact precautions  · Zosyn completed  · Completed Ertapenem course 12/28/22  · Starting Doxycycline 100 mg Q12h 12/30/22 per ID consultants    · Doxycycline stopped, Ertapenem restarted complete 01/07    · 3 drains placed by IR  · Monitor 3 drain output:  · Abscess drain abdomen A: 0 cc   · Abscess drain back: 0-10 cc  · Abscess drain abdomen B: 15 - 20cc    1/3- tube check w/ IR  Tube position check  Patient having some discomfort in left abdomen while supine - per IR tubes in place, bulbs changed, recheck tube placement in 2 weeks  1/4- drain #3 developed sanguineous drainage with flushing, IR evaluated - flushing appropriately  Continue daily flushes on all the drains and monitor output  Reach out to IR if drainage is <10 cc per day for 2 days

## 2022-12-27 NOTE — PROGRESS NOTES
Physical Medicine and Rehabilitation Progress Note   Call Coverage  Enedina Lama 61 y o  male MRN: [id number]  Unit/Bed#: -01 Encounter: [id number]      Assessment & Plan:     Decline in ADLs and mobility: Functional assessment  Toileting hygiene significant assist  Lower body dressing significant assist  Upper body dressing and bathing moderate assist  Transfers moderate to max assist  Ambulation 10 feet moderate assist    PMR On-call focused problem list:  (Additional problems may be listed in primary providers weekly notes)     Abscess  Assessment & Plan  · Abdominal abscess- ESBL E Coli  · Zosyn completed  · Remains on Ertapenem per ID  With plan to transition to PO doxy in a few days  · Contact precautions  · VICTORINO drain- milky drainage - Surg-onc and ID aware and following  · Monitor VICTORINO drain output   • Drains per IR - if outputs remains <10 cc, request tube check - still above mostly     Sepsis ([facility] [address] )  Assessment & Plan  · SIRS versus sepsis at this time given his vitals, leukocytosis, and complicated infection history  · Mgmt per ID, IM  · 12/20-IR perisplenic, perigastric drain placement for 2 additional abscesses-  · Perigastric abscess +E  Coli ESBL  · Blood cultures negative     Acute on chronic kidney failure (HCC)  Assessment & Plan  · Creatinine baseline 1 2-1 4  · Cr up to 1 41   · IVF per IM 12/26  · Monitor BMP    Bacteremia  Assessment & Plan  · Cleared   · Remains on Abx     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 liver [facility])  Assessment & Plan  · MRI-multiple hepatic metastasis; smaller lesions in left lobe, larger lesions in right lobe  · 11/7 Exp Lap, resection of hepatic segment 3, resection of transverse colon with reversal of loop colostomy (Dr Dheeraj Paul)  · 11/16- right hemicolectomy, left discontinuity and temporary abdominal closure device placed  · 11/17- re-exploration, partial descending colectomy, primary colocolonic anastomosis with diverting loop ileostomy, midline VAC placement  · CT showed abdominal abscess and distended gallbladder  · 12/8- IR percutaneous cholecystostomy tube, hepatectomy abscess drain placement  · Monitor incisions, dressings  · Monitor CBC, CMP  · IM consulted for assistance with management  · Follow-up outpatient with hem/onc  ·  IR on 12/20 additional drains placed for a perisplenic abscess as well as a splenic recess abscess  · On abx     Iron deficiency anemia, unspecified  Assessment & Plan  · Hbg down somewhat to 7 3   · 12/15-1 unit PRBc  · 12/16- symptomatic- 1 unit PRBc  · Monitor CBC    Obesity (BMI 30-39  9)  Assessment & Plan  · BMI 33 0  · Diet and lifestyle modifications  · Nutrition consult    Benign essential hypertension  Assessment & Plan  · Home: Lisinopril  · Here: Norvasc 5 mg BID  · Adjust medication as needed  · IM consult for assistance with management  · Monitored outpatient by PCP    Type 2 diabetes mellitus without complication, with long-term current use of insulin (HCC)  Assessment & Plan  · HbgA1c 8 0 (9/22)  · Home monitoring with Loly Schwab  · Home: Lantus 12 units, Humalog 3 units, Januvia  · Here: Humalog 6 units with meals, SSI, Lantus   · Follow with PCP (Dr Hosea Moritz)        * Malignant neoplasm of transverse colon [facility])  Assessment & Plan  · Transverse colonic apple core lesion and innumerable hepatic metastases  · Colostomy 2/22  · RCW port-a-cath, accessed  · S/p palliative chemo  · Follows with Dr Anthony Dunbar (palliative)  · Follows hem/onc (Dr Millie Dobbs)      Other Medical Issues:  • VTE risk heparin and SCDs  • Pain: Oxycodone 2 5, 5, 10 mg as needed      Objective: Allergies per EMR  Diagnostic Studies: Reviewed, no new imaging  IR drainage tube placement   Final Result by Ajay Holguin MD (12/20 2003)      Percutaneous placement of abscess drainage catheters into the perigastric and perisplenic abscesses using 10-French catheters        Plan:       - Catheters to bulb suction drainage    - Flush catheters with 10 mL normal saline daily    - Return in 2 weeks for drain check  Workstation performed: [id number]         CT abdomen pelvis w contrast   Final Result by Anushka Frances MD (18/39 1859)      Status post cholecystostomy tube placement with decompression of the gallbladder  In addition, a catheter placed adjacent to the cut edge of the liver is increased position with near complete resolution of fluid collection  Loculated collection along the splenic recess of the lesser sac is unchanged in size  Additional perisplenic collection in the hilum and along the inferior pole are unchanged  Collection of fluid and gas in the left upper quadrant adjacent to left kidney also not significantly changed    No extravasated oral contrast             Workstation performed: [id number]         IR drainage tube check/change/reposition/reinsertion/upsize    (Results Pending)     See above as well    Laboratory: Labs reviewed  Results from last 7 days   Lab Units 12/26/22  0512 12/24/22  1057 12/23/22  1054 12/23/22  0637   HEMOGLOBIN g/dL 7 3* 7 4* 8 3* 7 6*   HEMATOCRIT % 22 8* 24 8* 26 9* 24 6*   WBC Thousand/uL 12 30* 12 33*  --  13 22*     Results from last 7 days   Lab Units 12/26/22  0512 12/24/22  0827 12/22/22  0552 12/21/22  0748   BUN mg/dL 25  --  17 18   SODIUM mmol/L 138  --  140 138   POTASSIUM mmol/L 4 1  --  4 0 4 0   CHLORIDE mmol/L 109*  --  110* 108   CREATININE mg/dL 1 41*  --  1 22 1 28   AST U/L 51* 74* 56* 57*   ALT U/L 16 19 17 17            Drug regimen reviewed, all potential adverse effects identified and addressed:    Scheduled Meds:  Current Facility-Administered Medications   Medication Dose Route Frequency Provider Last Rate   • acetaminophen  975 mg Oral Q8H [address] GAURANG Keith     • amLODIPine  5 mg Oral BID GAURANG Keith     • aspirin  81 mg Oral Daily GAURANG Keith     • atorvastatin  40 mg Oral Daily GAURANG Keith     • calcium carbonate  500 mg Oral BID PRN GAURANG Zamora     • cholecalciferol  400 Units Oral Daily GAURANG Keith     • ertapenem  1,000 mg Intravenous Q24H Wai Vegas MD 1,000 mg (12/26/22 1813)   • heparin (porcine)  5,000 Units Subcutaneous Q8H [address] GAURANG Keith     • insulin glargine  12 Units Subcutaneous HS Georgeana Sever Harleman, CRNP     • insulin lispro  1-5 Units Subcutaneous HS GAURANG Keith     • insulin lispro  1-6 Units Subcutaneous TID AC GAURANG Keith     • insulin lispro  6 Units Subcutaneous TID With Meals Georgeana Sever Harleman, CRNP     • iohexol  50 mL Oral 90 min pre-procedure Wai Vegas MD     • melatonin  3 mg Oral HS GAURANG Keith     • methocarbamol  500 mg Oral BID GAURANG Zamora     • multivitamin-minerals  1 tablet Oral Daily GAURANG Keith     • ondansetron  4 mg Oral Q6H PRN GAURANG Zamora     • oxyCODONE  10 mg Oral Q6H PRN GAURANG Zamora     • oxyCODONE  2 5 mg Oral Q3H PRN GAURANG Zamora     • oxyCODONE  5 mg Oral Q6H PRN GAURANG Keith     • pantoprazole  40 mg Oral BID AC GAURANG Keith     • simethicone  80 mg Oral 4x Daily (with meals and at bedtime) GAURANG Zamora     • tamsulosin  0 4 mg Oral Daily With Dinner GAURANG Zamora         Chief Complaints:  L tube site pain     Subjective:     Patient notes some pain at all of his tube sites but most pain at his left upper abdominal site which has been stable last few days  Patient denies increased fever, chills, sweats, nausea  He notes occasional lightheadedness  He denies constipation, or other new complaints  ROS: A 10 point ROS was performed; negative except as noted above         Physical Exam:  12/26/22 1334 98 °F (36 7 °C) 88 16 116/72 -- 96 % None (Room air)     Vitals above reviewed on date of encounter      GEN:  Sitting in NAD   HEENT/NECK: MMM  CARDIAC: Regular rate rhythm, no murmers, no rubs, no gallops  LUNGS:  clear to auscultation, no wheezes, rales, or rhonchi  ABDOMEN: Abdomin soft, mildly distended, LUQ VICTORINO drains draining purulent d/c without redness around tubes, R perc radha tube without signs of infection around tube, Abdominal site stable overall; ileostomy with stool in bag  EXTREMITIES/SKIN:  no calf edema, no calf tenderness to palpation and  Buttock skin intact  NEURO:   MENTAL STATUS:  Appropriate wakefulness and interaction   PSYCH:  Affect:  Euthymic       ** Please Note: Fluency Direct voice to text software may have been used in the creation of this document  **    I personally performed the required components and examined the patient myself in person on 12/26/22

## 2022-12-27 NOTE — PROGRESS NOTES
PM&R PROGRESS NOTE:  Daisy Mcgee 61 y o  male MRN: [id number]  Unit/Bed#: [facility] 122-10 Encounter: [id number]        Rehabilitation Diagnosis: Impairment of mobility, safety, Activities of Daily Living (ADLs), and cognitive/communication skills due to Neurologic Conditions:  03 8  Neuromuscular Disorders Critical Illness Myopathy    SUBJECTIVE: Patient seen face to face  Had a bad night as his left abdomen was in pain  Better this morning  Feeling he is doing well in therapies  Denies chest pain or SOB currently  ASSESSMENT: Stable, progressing      PLAN:    Rehabilitation  • Functional deficits:  Decreased endurance, strength, impaired balance, impaired mobility/self care  • Continue current rehabilitation plan of care to maximize function  • Estimated Discharge: TBD    DVT prophylaxis  • Managed on SQ Heparin    Bladder plan  • Continent    Bowel plan  • Continent      Abscess  Assessment & Plan  · Abdominal abscess- ESBL E Coli  · Zosyn completed  · Remains on Ertapenem per ID  With plan to transition to PO doxy in a few days  · Contact precautions  · VICTORINO drain- milky drainage - Surg-onc and ID aware and following  · Monitor VICTORINO drain output   • Drains per IR - if outputs remains <10 cc, request tube check - still above mostly     Sepsis ([facility] [address] )  Assessment & Plan  · SIRS versus sepsis at this time given his vitals, leukocytosis, and complicated infection history  · Mgmt per ID, IM  · 12/20-IR perisplenic, perigastric drain placement for 2 additional abscesses-  · Perigastric abscess +E  Coli ESBL  · Blood cultures negative     Acute on chronic kidney failure (HCC)  Assessment & Plan  · Creatinine baseline 1 2-1 4  · Cr up to 1 41   · IVF per IM 12/26  · Monitor BMP    Bacteremia  Assessment & Plan  · Cleared   · Remains on Abx     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 liver [facility])  Assessment & Plan  · MRI-multiple hepatic metastasis; smaller lesions in left lobe, larger lesions in right lobe  · 11/7 Exp Lap, resection of hepatic segment 3, resection of transverse colon with reversal of loop colostomy (Dr Dheeraj Paul)  · 11/16- right hemicolectomy, left discontinuity and temporary abdominal closure device placed  · 11/17- re-exploration, partial descending colectomy, primary colocolonic anastomosis with diverting loop ileostomy, midline VAC placement  · CT showed abdominal abscess and distended gallbladder  · 12/8- IR percutaneous cholecystostomy tube, hepatectomy abscess drain placement  · Monitor incisions, dressings  · Monitor CBC, CMP  · IM consulted for assistance with management  · Follow-up outpatient with hem/onc  ·  IR on 12/20 additional drains placed for a perisplenic abscess as well as a splenic recess abscess  · On abx     Iron deficiency anemia, unspecified  Assessment & Plan  · Hbg down somewhat to 7 3   · 12/15-1 unit PRBc  · 12/16- symptomatic- 1 unit PRBc  · Monitor CBC    Obesity (BMI 30-39  9)  Assessment & Plan  · BMI 33 0  · Diet and lifestyle modifications  · Nutrition consult    Benign essential hypertension  Assessment & Plan  · Home: Lisinopril  · Here: Norvasc 5 mg BID  · Adjust medication as needed  · IM consult for assistance with management  · Monitored outpatient by PCP    Type 2 diabetes mellitus without complication, with long-term current use of insulin (HCC)  Assessment & Plan  · HbgA1c 8 0 (9/22)  · Home monitoring with Nilesh  · Home: Lantus 12 units, Humalog 3 units, Januvia  · Here: Humalog 6 units with meals, SSI, Lantus   · Follow with PCP (Dr Dorothy Villegas)        * Malignant neoplasm of transverse colon [facility])  Assessment & Plan  · Transverse colonic apple core lesion and innumerable hepatic metastases  · Colostomy 2/22  · RCW port-a-cath, accessed  · S/p palliative chemo  · Follows with Dr Kwaku Adame (palliative)  · Follows hem/onc (Dr Brandin Weems)        Appreciate IM consultants medical co-management  Labs, medications, and imaging personally reviewed        ROS:  A ten point review of systems was completed on 12/27/22 and pertinent positives are listed in subjective section  All other systems reviewed were negative  OBJECTIVE:   /73 (BP Location: Left arm)   Pulse 105   Temp 98 4 °F (36 9 °C) (Oral)   Resp 18   Ht 5' 10" (1 778 m)   Wt 104 kg (230 lb)   SpO2 98%   BMI 33 00 kg/m²     Physical Exam  Vitals and nursing note reviewed  Constitutional:       General: He is not in acute distress  HENT:      Head: Normocephalic and atraumatic  Nose: Nose normal       Mouth/Throat:      Mouth: Mucous membranes are moist    Eyes:      Conjunctiva/sclera: Conjunctivae normal    Cardiovascular:      Rate and Rhythm: Normal rate and regular rhythm  Pulses: Normal pulses  Pulmonary:      Effort: Pulmonary effort is normal       Breath sounds: Normal breath sounds  No wheezing or rales  Abdominal:      General: Bowel sounds are normal  There is no distension  Palpations: Abdomen is soft  Tenderness: There is no abdominal tenderness  Musculoskeletal:         General: No swelling  Cervical back: Neck supple  Skin:     General: Skin is warm  Comments: Perc radha tube: brown-bilious fluid  3 VICTORINO drains milky drainage  +Ostomy with brown colored stool   Neurological:      Mental Status: He is alert and oriented to person, place, and time  Motor: Weakness present     Psychiatric:         Mood and Affect: Mood normal           Lab Results   Component Value Date    WBC 12 30 (H) 12/26/2022    HGB 7 3 (L) 12/26/2022    HCT 22 8 (L) 12/26/2022    MCV 84 12/26/2022     12/26/2022     Lab Results   Component Value Date    SODIUM 138 12/26/2022    K 4 1 12/26/2022     (H) 12/26/2022    CO2 22 12/26/2022    BUN 25 12/26/2022    CREATININE 1 41 (H) 12/26/2022    GLUC 198 (H) 12/26/2022    CALCIUM 8 3 12/26/2022     Lab Results   Component Value Date    INR 1 12 12/19/2022    INR 1 10 11/12/2022    INR 1 24 (H) 11/09/2022    PROTIME 14 7 (H) 12/19/2022    PROTIME 14 4 11/12/2022    PROTIME 15 8 (H) 11/09/2022           Current Facility-Administered Medications:   •  acetaminophen (TYLENOL) tablet 975 mg, 975 mg, Oral, Q8H [address], GAURANG Keith, 975 mg at 12/27/22 0548  •  amLODIPine (NORVASC) tablet 5 mg, 5 mg, Oral, BID, GAURANG Keith, 5 mg at 12/27/22 1484  •  aspirin chewable tablet 81 mg, 81 mg, Oral, Daily, GAURANG Keith, 81 mg at 12/27/22 0854  •  atorvastatin (LIPITOR) tablet 40 mg, 40 mg, Oral, Daily, GAURANG Keith, 40 mg at 12/27/22 0854  •  calcium carbonate (TUMS) chewable tablet 500 mg, 500 mg, Oral, BID PRN, GAURANG Keith, 500 mg at 12/19/22 1715  •  cholecalciferol (VITAMIN D3) tablet 400 Units, 400 Units, Oral, Daily, GAURANG Keith, 400 Units at 12/27/22 0855  •  ertapenem (INVanz) 1,000 mg in sodium chloride 0 9 % 50 mL IVPB, 1,000 mg, Intravenous, Q24H, Oni Garland MD, Last Rate: 100 mL/hr at 12/26/22 1813, 1,000 mg at 12/26/22 1813  •  heparin (porcine) subcutaneous injection 5,000 Units, 5,000 Units, Subcutaneous, Q8H [address], GAURANG Keith, 5,000 Units at 12/27/22 0549  •  insulin glargine (LANTUS) subcutaneous injection 12 Units 0 12 mL, 12 Units, Subcutaneous, HS, GAURANG Graf, 12 Units at 12/26/22 2106  •  insulin lispro (HumaLOG) 100 units/mL subcutaneous injection 1-5 Units, 1-5 Units, Subcutaneous, HS, GAURANG Keith, 1 Units at 12/22/22 2130  •  insulin lispro (HumaLOG) 100 units/mL subcutaneous injection 1-6 Units, 1-6 Units, Subcutaneous, TID AC, 2 Units at 12/26/22 1631 **AND** Fingerstick Glucose (POCT), , , TID AC, GAURANG Keith  •  insulin lispro (HumaLOG) 100 units/mL subcutaneous injection 6 Units, 6 Units, Subcutaneous, TID With Meals, GAURANG Solorio, 6 Units at 12/26/22 1631  •  iohexol (OMNIPAQUE) 240 MG/ML solution 50 mL, 50 mL, Oral, 90 min pre-procedure, Oni Garland MD  •  melatonin tablet 3 mg, 3 mg, Oral, HS, GAURANG Keith, 3 mg at 12/26/22 2105  •  methocarbamol (ROBAXIN) tablet 500 mg, 500 mg, Oral, BID, GAURANG Keith, 500 mg at 12/27/22 0854  •  multivitamin-minerals (CENTRUM) tablet 1 tablet, 1 tablet, Oral, Daily, GAURANG Keith, 1 tablet at 12/27/22 0854  •  ondansetron (ZOFRAN-ODT) dispersible tablet 4 mg, 4 mg, Oral, Q6H PRN, GAURANG Keith, 4 mg at 12/17/22 0851  •  oxyCODONE (ROXICODONE) immediate release tablet 10 mg, 10 mg, Oral, Q6H PRN, GAURANG Keith, 10 mg at 12/27/22 8955  •  oxyCODONE (ROXICODONE) IR tablet 2 5 mg, 2 5 mg, Oral, Q3H PRN, GAURANG Keith, 2 5 mg at 12/25/22 1911  •  oxyCODONE (ROXICODONE) IR tablet 5 mg, 5 mg, Oral, Q6H PRN, GAURANG Keith, 5 mg at 12/26/22 2106  •  pantoprazole (PROTONIX) EC tablet 40 mg, 40 mg, Oral, BID AC, GAURANG Keith, 40 mg at 12/27/22 0841  •  simethicone (MYLICON) chewable tablet 80 mg, 80 mg, Oral, 4x Daily (with meals and at bedtime), GAURANG Keith, 80 mg at 12/27/22 0854  •  tamsulosin (FLOMAX) capsule 0 4 mg, 0 4 mg, Oral, Daily With Dinner, GAURANG Menon, 0 4 mg at 12/26/22 1631    Past Medical History:   Diagnosis Date   • Abdominal pain 03/12/2022   • Acute renal failure ([facility] )     [id number] resolved   • Cancer ([facility] [address] )    • Diabetes mellitus ([facility] )    • Enteritis 08/23/2016   • Gastroparesis due to DM ([facility] ) 08/23/2016   • GERD (gastroesophageal reflux disease)    • Hernia, ventral 08/04/2016   • Hyperlipidemia    • Hypertension    • Morbid obesity ([facility] ) 04/17/2018   • Postoperative visit 03/02/2022   • SIRS (systemic inflammatory response syndrome) ([facility] ) 03/12/2022   • Snoring    • Stage 3a chronic kidney disease ([facility] ) 02/19/2022       Patient Active Problem List    Diagnosis Date Noted   • Abscess 12/27/2022   • Acute pain 12/27/2022   • Sepsis ([facility] ) 12/17/2022   • Severe protein-calorie malnutrition ([facility] ) 12/14/2022   • Acute on chronic kidney failure ([facility] ) 12/14/2022   • Flash pulmonary edema ([place] [address] ) 11/12/2022   • MR (mitral regurgitation) 11/12/2022   • Bacteremia 11/12/2022   • ESBL (extended spectrum beta-lactamase) producing bacteria infection 11/12/2022   • Acute respiratory failure with hypoxia ([facility] ) 11/12/2022   • Encephalopathy 11/09/2022   • Cervical radiculopathy 10/26/2022   • Other fatigue 06/15/2022   • Colostomy prolapse ([facility] [address] ) 05/27/2022   • Colon cancer metastasized to liver ([facility] ) 03/12/2022   • 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 liver ([facility] ) 03/02/2022   • Iron deficiency anemia, unspecified 03/01/2022   • Malignant neoplasm of transverse colon ([facility] ) 03/01/2022   • Thrombocytosis 02/21/2022   • Hypokalemia 02/19/2022   • Transaminitis 02/19/2022   • Type 2 diabetes mellitus with hyperlipidemia ([facility] ) 02/18/2022   • Colonic mass 02/18/2022   • Microcytic anemia 02/18/2022   • Left ureteral calculus 01/30/2020   • Incarcerated umbilical hernia 82/66/6007   • Testicular hypogonadism 06/19/2017   • Low testosterone 05/30/2017   • Type 2 diabetes mellitus without complication, with long-term current use of insulin ([place] [address] ) 08/23/2016   • Benign essential hypertension 08/23/2016   • Mixed hyperlipidemia 08/23/2016   • Erectile dysfunction 07/11/2016   • Obesity (BMI 30-39 9) 07/11/2016          Rj Tian MD    Total time spent:  45 minutes with more than 50% spent counseling/coordinating care  Counseling includes discussion with patient re: progress and discussion with patient of his/her current medical/functional state/information  Coordination of patient's care was performed in conjunction with consulting services  Time invested included review of patient's labs, vitals, and management of their comorbidities with continued monitoring  The care of the patient was extensively discussed and appropriate treatment plan was formulated unique for this patient  ** Please Note:  voice to text software may have been used in the creation of this document   Although proof errors in transcription or interpretation are a potential of such software**

## 2022-12-27 NOTE — PROGRESS NOTES
[facility] Occupational Therapy Daily Note  Patient Active Problem List   Diagnosis   • Type 2 diabetes mellitus without complication, with long-term current use of insulin ([place] [address] )   • Benign essential hypertension   • Mixed hyperlipidemia   • Erectile dysfunction   • Low testosterone   • Obesity (BMI 30-39  9)   • Testicular hypogonadism   • Incarcerated umbilical hernia   • Left ureteral calculus   • Type 2 diabetes mellitus with hyperlipidemia (HCC)   • Colonic mass   • Microcytic anemia   • Hypokalemia   • Transaminitis   • Thrombocytosis   • Iron deficiency anemia, unspecified   • Malignant neoplasm of transverse colon (HCC)   • 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 liver [facility])   • Colon cancer metastasized to liver [facility])   • Colostomy prolapse (HCC)   • Other fatigue   • Cervical radiculopathy   • Encephalopathy   • Flash pulmonary edema (HCC)   • MR (mitral regurgitation)   • Bacteremia   • ESBL (extended spectrum beta-lactamase) producing bacteria infection   • Acute respiratory failure with hypoxia (HCC)   • Severe protein-calorie malnutrition (HCC)   • Acute on chronic kidney failure (HCC)   • Sepsis ([place] [address] )   • Abscess   • Acute pain       Past Medical History:   Diagnosis Date   • Abdominal pain 03/12/2022   • Acute renal failure ([place] [address] )     [id number] resolved   • Cancer ([place] [address] )    • Diabetes mellitus ([place] [address] )    • Enteritis 08/23/2016   • Gastroparesis due to DM ([place] [address] ) 08/23/2016   • GERD (gastroesophageal reflux disease)    • Hernia, ventral 08/04/2016   • Hyperlipidemia    • Hypertension    • Morbid obesity ([place] [address] ) 04/17/2018   • Postoperative visit 03/02/2022   • SIRS (systemic inflammatory response syndrome) ([place] [address] ) 03/12/2022   • Snoring    • Stage 3a chronic kidney disease ([place] [address] ) 02/19/2022     Etiologic Diagnosis: Critical illness myopathy  Restrictions/Precautions  Precautions: Fall Risk, Contact/isolation, Multiple lines, Supervision on toilet/commode (VICTORINO drain, ileostomy, IR drain)  Weight Bearing Restrictions: No  ROM Restrictions: No  ADL Team Goal: Patient will require assist with ADLs with least restrictive device upon completion of rehab program  Occupational Therapy LTG's  Eating Oral care Bathing LB dress UB dress   Eating Goal: 06  Independent - Patient completes the activity by him/herself with no assistance from a helper  Oral Hygiene Goal: 06  Independent - Patient completes the activity by him/herself with no assistance from a helper  Shower/bathe self Goal: 04  Supervision or touching assistance- [place] provides VERBAL CUES or supervision throughout activity  Lower body dressing Goal: 04  Supervision or touching assistance- [place] provides VERBAL CUES or supervision throughout activity  Upper body dressing Goal: 04  Supervision or touching assistance- [place]umann provides VERBAL CUES or supervision throughout activity  Toileting Toilet txf Func txf IADL Med    Toileting hygiene Goal: 04  Supervision or touching assistance- [place]umann provides VERBAL CUES or supervision throughout activity  Toilet transfer Goal: 04  Supervision or touching assistance- Trumann provides VERBAL CUES or supervision throughout activity  Assist Level: Supervision   OT interventions: Treatment/Interventions: ADL retraining, Functional transfer training, Therapeutic exercise, Endurance training, Patient/family training, Bed mobility, Equipment eval/education  Discharge Plan:  OT Discharge Recommendation:  (home with family support)   DME: Equipment Recommended:  (TBD),  ,       12/27/22 1230   Pain Assessment   Pain Assessment Tool 0-10   Pain Score 5   Pain Location/Orientation Location: Abdomen; Location: Generalized   Lifestyle   Autonomy "I want to dance with my wife "   Shower/Bathe Self   Type of Assistance Needed Physical assistance   Physical Assistance Level 26%-50%   Comment Pt compleded sponge bathing sitting in WC at sink  pt able to complete b/l UE release off sink to complete james bathing  assist for buttocks bathing   feet soaked in basin  Shower/Bathe Self CARE Score 3   Upper Body Dressing   Type of Assistance Needed Set-up / clean-up   Upper Body Dressing CARE Score 5   Lower Body Dressing   Type of Assistance Needed Physical assistance   Physical Assistance Level 51%-75%   Comment stance to don pants over hips able to complete button on pants with increased time  Lower Body Dressing CARE Score 2   Putting On/Taking Off Footwear   Type of Assistance Needed Physical assistance   Physical Assistance Level Total assistance   Putting On/Taking Off Footwear CARE Score 1   Sit to Lying   Type of Assistance Needed Physical assistance   Physical Assistance Level 51%-75%   Sit to Lying CARE Score 2   Sit to Stand   Type of Assistance Needed Physical assistance   Physical Assistance Level 25% or less   Sit to Stand CARE Score 3   Bed-Chair Transfer   Type of Assistance Needed Physical assistance   Physical Assistance Level 25% or less   Chair/Bed-to-Chair Transfer CARE Score 3   William 77 pt engages in ostomy bag emptying at toilet  Pt seated  some assist for fine motor components 2* decreased sensation  Pt reports confidence with previous use of ostomy care  Assessment   Treatment Assessment Pt participated in skilled OT treatment session with treatment focus on ADL re-training and fxnl xfers  Pt tolerated session well, with good motivation today despite feeling more pain this morning  Pt reporting feeling happy that he was able to complete some tasks in stance with both hands  Pt reporting today that he wants to be able to dance with his wife on new years lora  Pt cont to engage in discussion about d/c planning and ability to modify tasks to complete on the first floor     Plan   Progress Progressing toward goals   OT Therapy Minutes   OT Time In 1230   OT Time Out 1345   OT Total Time (minutes) 75   OT Mode of treatment - Individual (minutes) 75   OT Mode of treatment - Concurrent (minutes) 0   OT Mode of treatment - Group (minutes) 0   OT Mode of treatment - Co-treat (minutes) 0   OT Mode of Treatment - Total time(minutes) 75 minutes   OT Cumulative Minutes 855

## 2022-12-27 NOTE — PLAN OF CARE
Problem: Prexisting or High Potential for Compromised Skin Integrity  Goal: Skin integrity is maintained or improved  Description: INTERVENTIONS:  - Identify patients at risk for skin breakdown  - Assess and monitor skin integrity  - Assess and monitor nutrition and hydration status  - Monitor labs   - Assess for incontinence   - Turn and reposition patient  - Assist with mobility/ambulation  - Relieve pressure over bony prominences  - Avoid friction and shearing  - Provide appropriate hygiene as needed including keeping skin clean and dry  - Evaluate need for skin moisturizer/barrier cream  - Collaborate with interdisciplinary team   - Patient/family teaching  - Consider wound care consult   Outcome: Progressing     Problem: Potential for Falls  Goal: Patient will remain free of falls  Description: INTERVENTIONS:  - Educate patient/family on patient safety including physical limitations  - Instruct patient to call for assistance with activity   - Consult OT/PT to assist with strengthening/mobility   - Keep Call bell within reach  - Keep bed low and locked with side rails adjusted as appropriate  - Keep care items and personal belongings within reach  - Initiate and maintain comfort rounds  - Make Fall Risk Sign visible to staff  - Offer Toileting every  Hours, in advance of need  - Initiate/Maintain alarm  - Obtain necessary fall risk management equipment:   - Apply yellow socks and bracelet for high fall risk patients  - Consider moving patient to room near nurses station  Outcome: Progressing     Problem: PAIN - ADULT  Goal: Verbalizes/displays adequate comfort level or baseline comfort level  Description: Interventions:  - Encourage patient to monitor pain and request assistance  - Assess pain using appropriate pain scale  - Administer analgesics based on type and severity of pain and evaluate response  - Implement non-pharmacological measures as appropriate and evaluate response  - Consider cultural and social influences on pain and pain management  - Notify physician/advanced practitioner if interventions unsuccessful or patient reports new pain  Outcome: Progressing     Problem: INFECTION - ADULT  Goal: Absence or prevention of progression during hospitalization  Description: INTERVENTIONS:  - Assess and monitor for signs and symptoms of infection  - Monitor lab/diagnostic results  - Monitor all insertion sites, i e  indwelling lines, tubes, and drains  - Monitor endotracheal if appropriate and nasal secretions for changes in amount and color  - [place] appropriate cooling/warming therapies per order  - Administer medications as ordered  - Instruct and encourage patient and family to use good hand hygiene technique  - Identify and instruct in appropriate isolation precautions for identified infection/condition  Outcome: Progressing     Problem: SAFETY ADULT  Goal: Patient will remain free of falls  Description: INTERVENTIONS:  - Educate patient/family on patient safety including physical limitations  - Instruct patient to call for assistance with activity   - Consult OT/PT to assist with strengthening/mobility   - Keep Call bell within reach  - Keep bed low and locked with side rails adjusted as appropriate  - Keep care items and personal belongings within reach  - Initiate and maintain comfort rounds  - Make Fall Risk Sign visible to staff  - Offer Toileting every  Hours, in advance of need  - Initiate/Maintain alarm  - Obtain necessary fall risk management equipment:   - Apply yellow socks and bracelet for high fall risk patients  - Consider moving patient to room near nurses station  Outcome: Progressing  Goal: Maintain or return to baseline ADL function  Description: INTERVENTIONS:  -  Assess patient's ability to carry out ADLs; assess patient's baseline for ADL function and identify physical deficits which impact ability to perform ADLs (bathing, care of mouth/teeth, toileting, grooming, dressing, etc )  - Assess/evaluate cause of self-care deficits   - Assess range of motion  - Assess patient's mobility; develop plan if impaired  - Assess patient's need for assistive devices and provide as appropriate  - Encourage maximum independence but intervene and supervise when necessary  - Involve family in performance of ADLs  - Assess for home care needs following discharge   - Consider OT consult to assist with ADL evaluation and planning for discharge  - Provide patient education as appropriate  Outcome: Progressing  Goal: Maintains/Returns to pre admission functional level  Description: INTERVENTIONS:  - Perform BMAT or MOVE assessment daily    - Set and communicate daily mobility goal to care team and patient/family/caregiver  - Collaborate with rehabilitation services on mobility goals if consulted  - Perform Range of Motion  times a day  - Reposition patient every  hours    - Dangle patient  times a day  - Stand patient  times a day  - Ambulate patient  times a day  - Out of bed to chair  times a day   - Out of bed for meals  times a day  - Out of bed for toileting  - Record patient progress and toleration of activity level   Outcome: Progressing     Problem: DISCHARGE PLANNING  Goal: Discharge to home or other facility with appropriate resources  Description: INTERVENTIONS:  - Identify barriers to discharge w/patient and caregiver  - Arrange for needed discharge resources and transportation as appropriate  - Identify discharge learning needs (meds, wound care, etc )  - Arrange for interpretive services to assist at discharge as needed  - Refer to Case Management Department for coordinating discharge planning if the patient needs post-hospital services based on physician/advanced practitioner order or complex needs related to functional status, cognitive ability, or social support system  Outcome: Progressing     Problem: Nutrition/Hydration-ADULT  Goal: Nutrient/Hydration intake appropriate for improving, restoring or maintaining nutritional needs  Description: Monitor and assess patient's nutrition/hydration status for malnutrition  Collaborate with interdisciplinary team and initiate plan and interventions as ordered  Monitor patient's weight and dietary intake as ordered or per policy  Utilize nutrition screening tool and intervene as necessary  Determine patient's food preferences and provide high-protein, high-caloric foods as appropriate       INTERVENTIONS:  - Monitor oral intake, urinary output, labs, and treatment plans  - Assess nutrition and hydration status and recommend course of action  - Evaluate amount of meals eaten  - Assist patient with eating if necessary   - Allow adequate time for meals  - Recommend/ encourage appropriate diets, oral nutritional supplements, and vitamin/mineral supplements  - Order, calculate, and assess calorie counts as needed  - Recommend, monitor, and adjust tube feedings and TPN/PPN based on assessed needs  - Assess need for intravenous fluids  - Provide specific nutrition/hydration education as appropriate  - Include patient/family/caregiver in decisions related to nutrition  Outcome: Progressing     Problem: MOBILITY - ADULT  Goal: Maintain or return to baseline ADL function  Description: INTERVENTIONS:  -  Assess patient's ability to carry out ADLs; assess patient's baseline for ADL function and identify physical deficits which impact ability to perform ADLs (bathing, care of mouth/teeth, toileting, grooming, dressing, etc )  - Assess/evaluate cause of self-care deficits   - Assess range of motion  - Assess patient's mobility; develop plan if impaired  - Assess patient's need for assistive devices and provide as appropriate  - Encourage maximum independence but intervene and supervise when necessary  - Involve family in performance of ADLs  - Assess for home care needs following discharge   - Consider OT consult to assist with ADL evaluation and planning for discharge  - Provide patient education as appropriate  Outcome: Progressing  Goal: Maintains/Returns to pre admission functional level  Description: INTERVENTIONS:  - Perform BMAT or MOVE assessment daily    - Set and communicate daily mobility goal to care team and patient/family/caregiver  - Collaborate with rehabilitation services on mobility goals if consulted  - Perform Range of Motion  times a day  - Reposition patient every  hours    - Dangle patient  times a day  - Stand patient  times a day  - Ambulate patient  times a day  - Out of bed to chair  times a day   - Out of bed for meal times a day  - Out of bed for toileting  - Record patient progress and toleration of activity level   Outcome: Progressing

## 2022-12-27 NOTE — PROGRESS NOTES
12/27/22 0930   Therapy Time missed   Time missed?  Yes   Amount of time missed 30   Reason for time missed   (pain, fatigue)   Time(s) multiple attempts made 0930

## 2022-12-27 NOTE — PROGRESS NOTES
Progress Note - Surgical Oncology   [facility] 61 y o  male MRN: [id number]  Unit/Bed#: -22 Encounter: [id number]    Assessment:  60yoM w PMHx of metastatic colon cancer s/p exploratory laparotomy, hepatic segment 3 resection, transverse colectomy and reversal of loop colostomy on 23/6, complicated by an anastomotic leak requiring re-exploration, right hemicolectomy 11/16 and subsequent primary ileocolonic anastomosis with diverting loop ileostomy 11/17  Patient was readmitted for acute cholecystitis s/p percutaneous cholecystostomy tube placement on 12/8 and now s/p IR drainage of intra-abdominal fluid collections on 12/20    Vitals stable, afebrile  Labs pending      Ileostomy 825 stool  Perc radha tube 5cc bilious appearing  LUQ VICTORINO drains x 3: 30cc total, 20/5/5  *IR drain labeled "3" appears most cloudy/somewhat purulent     Plan:  - CCD2  - drains x3, monitor output and character  - per radha tube, monitor output and character  - ilestomy, monitor output and character  - on ertapenem, per ID  - SQH, ASA81  - SSI, control of blood sugars  - encourage ambulation, work w PT  - incentive spirometry  - pain control  - care per primary    Subjective/Objective   Subjective:   Patient reports continued pain w left sided drains, worse at night after ambulation, otherwise tolerating diet without nausea or emesis, having ileostomy function, ambulating/working w PT  Objective:     Blood pressure 168/73, pulse 105, temperature 98 4 °F (36 9 °C), temperature source Oral, resp  rate 18, height 5' 10" (1 778 m), weight 104 kg (230 lb), SpO2 98 %  ,Body mass index is 33 kg/m²        Intake/Output Summary (Last 24 hours) at 12/27/2022 9233  Last data filed at 12/27/2022 0548  Gross per 24 hour   Intake 490 ml   Output 1760 ml   Net -1270 ml       Invasive Devices     Central Venous Catheter Line  Duration           Port A Cath 03/10/22 Right Chest 291 days          Drain  Duration           Ileostomy LUQ 39 days    Abscess Drain Abdomen 18 days    Cholecystostomy Tube 14 days    Abscess Drain Abdomen 6 days    Abscess Drain Back 6 days                Physical Exam:  General: NAD  Skin: Warm, dry, anicteric  HEENT: Normocephalic, atraumatic  CV: RRR, no m/r/g  Pulm: CTA b/l, no inc WOB  Abd: Soft, ND, minimally tender, perc radha as above, ilestomy as above, drains x3 on the L as above  MSK: Symmetric, no edema, no tenderness, no deformity  Neuro: AOx3, GCS 15    Lab, Imaging and other studies:I have personally reviewed pertinent lab results      VTE Pharmacologic Prophylaxis: Sequential compression device (Venodyne)  and Heparin  VTE Mechanical Prophylaxis: sequential compression device

## 2022-12-27 NOTE — PROGRESS NOTES
12/27/22 1120   Pain Assessment   Pain Assessment Tool FLACC   Pain Rating: FLACC (Rest) - Face 0   Pain Rating: FLACC (Rest) - Legs 0   Pain Rating: FLACC (Rest) - Activity 0   Pain Rating: FLACC (Rest) - Cry 0   Pain Rating: FLACC (Rest) - Consolability 0   Score: FLACC (Rest) 0   Pain Rating: FLACC (Activity) - Face 1   Pain Rating: FLACC (Activity) - Legs 0   Pain Rating: FLACC (Activity) - Activity 0   Pain Rating: FLACC (Activity) - Cry 0   Pain Rating: FLACC (Activity) - Consolability 0   Score: FLACC (Activity) 1   Restrictions/Precautions   Precautions Fall Risk;Contact/isolation;Multiple lines;Supervision on toilet/commode  (VICTORINO drain, ileostomy, IR drain)   General   Change In Medical/Functional Status Patient's ostomy bag needed emptying/changing at the end of session  RN transitioning- notified Heather Foster to update his nurse on this need   Subjective   Subjective "thank you for circling back to me"   Lying to Sitting on Side of Bed   Type of Assistance Needed Supervision; Adaptive equipment   Physical Assistance Level No physical assistance   Comment S with increased time and reliance on bed rails and HOB being elevated   Lying to Sitting on Side of Bed CARE Score 4   Sit to Stand   Type of Assistance Needed Physical assistance   Physical Assistance Level 25% or less   Comment needed light boost on first stand to don pants- at this point he reported dizziness and needed seated rest break with instruction provided on PLB; CGA on second stand with no onset of dizziness   Sit to Stand CARE Score 3   Bed-Chair Transfer   Type of Assistance Needed Physical assistance   Physical Assistance Level 25% or less   Comment Ada with RW   Chair/Bed-to-Chair Transfer CARE Score 3   Transfer Bed/Chair/Wheelchair   Limitations Noted In Endurance;LE Strength;UE Strength;Balance;Confidence   Adaptive Equipment Roller Walker   Findings Patient declining AM PT 2* pain and fatigue but was eager to particiapte this PM  Agreeable to save stamina for PM sessions and was agreeable to transfer OOB, take short walk, and sit OOB in chair until OT session  He reported some dizziness with first stand but recovered with seated rest  he was pleased to wash his face OOB and wants to get washed with OT this PM- she was made aware  Additionally when OOB, noted fullness in ostomy bag- PCA made aware to pass along to new nurse to change  Walk 10 Feet   Type of Assistance Needed Physical assistance   Physical Assistance Level 25% or less   Comment Ada with RW   Walk 10 Feet CARE Score 3   Ambulation   Primary Mode of Locomotion Prior to Admission Walk   Distance Walked (feet) 20 ft   Assist Device Roller Walker   Gait Pattern Inconsistant Josefina;Decreased foot clearance   Limitations Noted In Endurance; Sequencing   Findings "I need to walk every day"  Performed short walk outside room then back onto recliner chair   Does the patient walk? 2  Yes   Assessment   Treatment Assessment Patient making progress with skilled PT intervention thusfar however still remains with barriers of medical deconditioning, pain, fatigue, LE weakness and imbalance  These deficits negatively impact patient's ability to engage in acute rehab program, however improving since evaluation  These deficits also negatively impact his ability to perform funcitonal mobility, requrining min to mod A depending on fatigue level and time of day  It also impacts his ability to perform stairs  Patient has 2 vs 3 ADRIAN home and a FF to second floor  He is to trial steps later this PM during PT session if appropriate  Last week, PT spoke with wife and encouraged ramping and a first floor set up  Family will need to be brought in for FT from a therapy and nursing perspective  Anticipate patient would functionally benefit from continued skilled PT intervention additional 10-14 days with a plan for a first floor and assist at home     PT Therapy Minutes   PT Time In 1120   PT Time Out 1150   PT Total Time (minutes) 30   PT Mode of treatment - Individual (minutes) 30   PT Mode of treatment - Concurrent (minutes) 0   PT Mode of treatment - Group (minutes) 0   PT Mode of treatment - Co-treat (minutes) 0   PT Mode of Treatment - Total time(minutes) 30 minutes   PT Cumulative Minutes 680

## 2022-12-27 NOTE — ASSESSMENT & PLAN NOTE
· Managed on Scheduled Tylenol 975 mg [id number]  · Managed on Robaxin 500 mg BId  · Oxycodone IR PRN  · Managed well

## 2022-12-27 NOTE — PROGRESS NOTES
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  • s/p hepatic resection and reversal of colostomy on 11/7  • Return to the OR 11/16 for right hemicolectomy with partial descending colectomy colocolonic anastomosis with loop ileostomy and mid line abdominal VAC placement  • Continue local wound care; WC following  • Ileostomy output stable  • CT  Abd/pelvis 12/17 = loculated collection along splenic recess   Has perisplenic hilum collection as well --> to IR 12/20 for drain placement in both perigastric and perisplenic area and cx sent from both areas = Ecoli  • Flush drains with 10ml qd; tube check 2 weeks 1/3/23  • Surgery following and monitoring drain output  • Surgical oncology following     Acute cholecystitis  • s/p percutaneous tube placement 12/8 with tube check on 12/12 and 12/14  • GI following due to LFT elevation = felt alkaline phosphatase elevated 2/2 infiltrative disease rather than focal biliary obstruction   AMA was negative     • Alkaline phosphatase isoenzyme sent 12/17 = GI aware that has resulted  They recommended continuing to monitor his LFTs and defer to surgical oncology for further w/u and plan  • LFT seem to be plateuing  • Consider MRCP if total bili trends back up  • Monitor biweekly or as needed     Bacteremia/abdominal abscess  • ID following and continuing Ertapenem thru 12/30 then switch to doxycycline  • Blood cultures negative    • CT abdomen and pelvis as above  • Ongoing night sweats     Hypertension  • On Norvasc 5mg BID  • stable     Diabetes mellitus  • On Lantus 12U qhs/Lispro 6U TID  • Continue diabetic diet and QID Accuchecks with SSI  • BS stable no changes today     Acute on chronic renal failure  • Stage III; baseline 1 2-1 4  • Lisinopril currently on hold  • S/p 1 liter IVF 12/26     Anemia  • Multifactorial due to recent infection, surgery, CKD stage III  • Transfused on 12/15 for hemoglobin 7 3 and 12/16/22 for hgb of 8   • hgb 7 3 12/26  • No transfusion for now per pt request  • He is agreeable if it drops below 7  • Repeat labs in am     Atypical chest pain  • With elevation in troponin/EKG changes  • Cardiology cleared pt for discharge  • Did not recommend any further work up  • No further chest pain     Situational depression  • Neuropsychology consulted  • Patient not interested in medications at this point in time     Malnutrition  • Recommend dietician consultation to optimize wound healing  • Glucerna makes him nauseated  • On 12/15, ordered double protein  • He will bring supplement from home     Pleural effusion  • CXR on 12/14 showed small left pleural effusion and was suspect for CHF  • ECHO 11/9/22 = LVEF 55%, unable to assess diastolic function, mild TR    L sided pleuritic pain  · Right at drain site  · No evidence of infection  · Worse with deep breath  · Will repeat Cxray        Discharge date:  Team       The above assessment and plan was reviewed and updated as determined by my evaluation of the patient on 12/27/2022      Labs:   Results from last 7 days   Lab Units 12/26/22  0512 12/24/22  1057   WBC Thousand/uL 12 30* 12 33*   HEMOGLOBIN g/dL 7 3* 7 4*   HEMATOCRIT % 22 8* 24 8*   PLATELETS Thousands/uL 373 365     Results from last 7 days   Lab Units 12/26/22  0512 12/22/22  0552   SODIUM mmol/L 138 140   POTASSIUM mmol/L 4 1 4 0   CHLORIDE mmol/L 109* 110*   CO2 mmol/L 22 23   BUN mg/dL 25 17   CREATININE mg/dL 1 41* 1 22   CALCIUM mg/dL 8 3 9 0             Results from last 7 days   Lab Units 12/27/22  0552 12/26/22  2056 12/26/22  1550   POC GLUCOSE mg/dl 128 124 206*       Review of Scheduled Meds:  Current Facility-Administered Medications   Medication Dose Route Frequency Provider Last Rate   • acetaminophen  975 mg Oral Q8H [facility] GAURANG Keith     • amLODIPine  5 mg Oral BID GAURANG Alford     • aspirin  81 mg Oral Daily GAURANG Keith     • atorvastatin  40 mg Oral Daily GAURANG Keith     • calcium carbonate  500 mg Oral BID PRN GAURANG Alford     • cholecalciferol  400 Units Oral Daily GAURANG Keith     • ertapenem  1,000 mg Intravenous Q24H Felice Rizzo MD 1,000 mg (12/26/22 1813)   • heparin (porcine)  5,000 Units Subcutaneous Q8H [facility] GAURANG Keith     • insulin glargine  12 Units Subcutaneous HS GAURANG Bradshaw     • insulin lispro  1-5 Units Subcutaneous HS GAURANG Keith     • insulin lispro  1-6 Units Subcutaneous TID AC GAURANG Keith     • insulin lispro  6 Units Subcutaneous TID With Meals GAURANG Bradshaw     • iohexol  50 mL Oral 90 min pre-procedure Felice Rizzo MD     • melatonin  3 mg Oral HS GAURANG Keith     • methocarbamol  500 mg Oral BID GAURANG Alford     • multivitamin-minerals  1 tablet Oral Daily GAURANG Keith     • ondansetron  4 mg Oral Q6H PRN GAURANG Alford     • oxyCODONE  10 mg Oral Q6H PRN GAURANG Keith     • oxyCODONE  2 5 mg Oral Q3H PRN GAURANG Alford     • oxyCODONE  5 mg Oral Q6H PRN GAURANG Keith     • pantoprazole  40 mg Oral BID AC GAURANG Keith     • simethicone  80 mg Oral 4x Daily (with meals and at bedtime) GAURANG Alford     • tamsulosin  0 4 mg Oral Daily With Dinner GAURANG Alford         Subjective/ HPI: Patient seen and examined  Patients overnight issues or events were reviewed with nursing or staff during rounds or morning huddle session  New or overnight issues include the following:     Pt seen in bed  Did not have a good night, on and off sweating and states he missed his oxycodone and got behind on his pain  He points to the pain directly on his drain site   There is no evidence of infection, he states the pain is with deep breaths      ROS:   A 10 point ROS was performed; negative except as noted above  *Labs /Radiology studies reviewed  *Medications reviewed and reconciled as needed  *Please refer to order section for additional ordered labs studies  *Case discussed with primary attending during morning huddle case rounds    Physical Examination:  Vitals:   Vitals:    12/26/22 1334 12/26/22 1700 12/26/22 2002 12/27/22 0548   BP: 116/72 122/76 140/72 168/73   BP Location: Right arm  Left arm Left arm   Pulse: 88 98 98 105   Resp: 16  17 18   Temp: 98 °F (36 7 °C)  98 8 °F (37 1 °C) 98 4 °F (36 9 °C)   TempSrc: Oral  Oral Oral   SpO2: 96%  99% 98%   Weight:       Height:           GEN: No apparent distress, interactive, fatigued this am  NEURO: Alert and oriented x3  HEENT: Pupils are equal and reactive, EOMI, mucous membranes are moist, face symmetrical  CV: S1 S2 regular, no MRG, no peripheral edema noted  RESP: Lungs are clear bilaterally, no wheezes, rales or rhonchi noted, on room air, respirations easy and non labored; poor effort  GI: obese, soft non tender, non distended; +BS x4; choley drain with bilious drainage, the other drains with milky white drainage; ileostomy with loose brown stool; outputs reviewed and stable  : Voiding without difficulty  MUSC: Moves all extremities; generalized deconditioning  SKIN: pale, warm and dry, normal turgor, no rashes, lesions; abdominal incisions refer to media images, no evidence of infection          The above physical exam was reviewed and updated as determined by my evaluation of the patient on 12/27/2022      Invasive Devices     Central Venous Catheter Line  Duration           Port A Cath 03/10/22 Right Chest 291 days          Drain  Duration           Ileostomy LUQ 39 days    Abscess Drain Abdomen 18 days    Cholecystostomy Tube 14 days    Abscess Drain Abdomen 6 days    Abscess Drain Back 6 days                   VTE Pharmacologic Prophylaxis: Heparin  Code Status: Level 1 - Full Code  Current Length of Stay: 13 day(s)      Total time spent:  30 minutes with more than 50% spent counseling/coordinating care  Counseling includes discussion with patient re: progress  and discussion with patient of his/her current medical state/information  Coordination of patient's care was performed in conjunction with primary service  Time invested included review of patient's labs, vitals, and management of their comorbidities with continued monitoring  In addition, this patient was discussed with medical team including physician and advanced extenders  The care of the patient was extensively discussed and appropriate treatment plan was formulated unique for this patient  Medical decision making for the day was made by supervising physician unless otherwise noted in their attestation statement  ** Please Note:  voice to text software may have been used in the creation of this document   Although proof errors in transcription or interpretation are a potential of such software**

## 2022-12-27 NOTE — PROGRESS NOTES
12/27/22 1430   Pain Assessment   Pain Assessment Tool 0-10   Pain Score 3   Pain Location/Orientation Location: Generalized   Pain Onset/Description Onset: Ongoing   Patient's Stated Pain Goal No pain   Hospital Pain Intervention(s) Emotional support   Restrictions/Precautions   Precautions Fall Risk;Contact/isolation;Multiple lines;Pain;Supervision on toilet/commode  (VICTORINO drain, ileostomy, IR drain)   Weight Bearing Restrictions No   ROM Restrictions No   Cognition   Overall Cognitive Status WFL   Arousal/Participation Cooperative   Attention Attends with cues to redirect   Orientation Level Oriented X4   Memory Within functional limits   Following Commands Follows one step commands with increased time or repetition   Sit to Lying   Type of Assistance Needed Physical assistance;Verbal cues; Adaptive equipment   Physical Assistance Level 26%-50%   Comment A to BLE , pt able to bridge to readjust in bed  Sit to Lying CARE Score 3   Lying to Sitting on Side of Bed   Type of Assistance Needed Supervision; Adaptive equipment   Comment with incraased time, bed rail and HOB elevated  Lying to Sitting on Side of Bed CARE Score 4   Sit to Stand   Type of Assistance Needed Physical assistance;Verbal cues; Adaptive equipment   Physical Assistance Level 25% or less   Comment increased time to get edge of chair, VC's for hand placement at times  Sit to Stand CARE Score 3   Bed-Chair Transfer   Type of Assistance Needed Physical assistance;Verbal cues; Adaptive equipment   Physical Assistance Level 25% or less   Comment MARTY with RW   Chair/Bed-to-Chair Transfer CARE Score 3   Transfer Bed/Chair/Wheelchair   Adaptive Equipment Roller Walker   Car Transfer   Comment (S)  trial upcoming session, pt limited by fatigue   Reason if not Attempted Safety concerns   Car Transfer CARE Score 88   Walk 150 Feet   Reason if not Attempted Safety concerns   Walk 150 Feet CARE Score 88   Walking 10 Feet on Uneven Surfaces   Reason if not Attempted Safety concerns   Walking 10 Feet on Uneven Surfaces CARE Score 88   Ambulation   Primary Mode of Locomotion Prior to Admission Walk   Distance Walked (feet) 5 ft  (to bed)   Assist Device Roller Walker   Does the patient walk? 2  Yes   Wheel 50 Feet with Two Turns   Reason if not Attempted Activity not applicable   Wheel 50 Feet with Two Turns CARE Score 9   Wheel 150 Feet   Reason if not Attempted Activity not applicable   Wheel 981 Feet CARE Score 9   Curb or Single Stair   Style negotiated Single stair   Type of Assistance Needed Physical assistance;Verbal cues; Adaptive equipment   Physical Assistance Level Total assistance   Comment MODA x1, CGA/MARTY of second person for safety  1 Step (Curb) CARE Score 1   4 Steps   Reason if not Attempted Safety concerns   4 Steps CARE Score 88   12 Steps   Reason if not Attempted Safety concerns   12 Steps CARE Score 88   Stairs   Type Stairs   # of Steps 2   Weight Bearing Precautions Fall Risk   Assist Devices Bilateral Rail   Findings retro descend on 6"  steps   Picking Up Object   Reason if not Attempted Safety concerns   Picking Up Object CARE Score 88   Assessment   Treatment Assessment 60 min skilled PT session with increased focus on gait, stair negotiation and increased act tolerance  Pt able to perform x2 steps this session with A x2 for safety  Pt c/o increased dizziness once seated from steps  Seated BP taken 128/78, standing 95/65  2* cont c/o lightheadedness/dizziness distance amb held off  Once back in room pt able to amb 5' with RW and MARTY to bed  With increased time to position himself back further in bed pt required less A to bring BLE into bed  Once supine pt able to bridge and scoot over in bed  A was still needed for boost in bed as pt wanted to sit up and eat  Pt will cont to benefit from increased bed mobility, increased act tolerance, increased HH dist amb and stair negotiation to enter home  Cont POC as tolerated     Problem List Decreased strength;Decreased range of motion;Decreased endurance; Impaired balance;Decreased mobility;Pain   Barriers to Discharge Inaccessible home environment;Decreased caregiver support   PT Barriers   Functional Limitation Car transfers; Ramp negotiation;Standing;Transfers; Walking;Stair negotiation; Wheelchair management   Plan   Treatment/Interventions Functional transfer training;LE strengthening/ROM; Therapeutic exercise; Endurance training;Patient/family training;Bed mobility;Gait training   Progress Progressing toward goals   Recommendation   PT Discharge Recommendation Home with home health rehabilitation   Equipment Recommended Walker; Wheelchair  (hospital bed)   PT Therapy Minutes   PT Time In 1430   PT Time Out 1530   PT Total Time (minutes) 60   PT Mode of treatment - Individual (minutes) 60   PT Mode of treatment - Concurrent (minutes) 0   PT Mode of treatment - Group (minutes) 0   PT Mode of treatment - Co-treat (minutes) 0   PT Mode of Treatment - Total time(minutes) 60 minutes   PT Cumulative Minutes 740   Therapy Time missed   Time missed?  No

## 2022-12-28 LAB
ALBUMIN SERPL BCP-MCNC: 1.5 G/DL (ref 3.5–5)
ALP SERPL-CCNC: 952 U/L (ref 46–116)
ALT SERPL W P-5'-P-CCNC: 15 U/L (ref 12–78)
ANION GAP SERPL CALCULATED.3IONS-SCNC: 6 MMOL/L (ref 4–13)
AST SERPL W P-5'-P-CCNC: 46 U/L (ref 5–45)
BASOPHILS # BLD AUTO: 0.03 THOUSANDS/ÂΜL (ref 0–0.1)
BASOPHILS NFR BLD AUTO: 0 % (ref 0–1)
BILIRUB SERPL-MCNC: 0.88 MG/DL (ref 0.2–1)
BUN SERPL-MCNC: 21 MG/DL (ref 5–25)
CALCIUM ALBUM COR SERPL-MCNC: 10.9 MG/DL (ref 8.3–10.1)
CALCIUM SERPL-MCNC: 8.9 MG/DL (ref 8.3–10.1)
CHLORIDE SERPL-SCNC: 112 MMOL/L (ref 96–108)
CO2 SERPL-SCNC: 21 MMOL/L (ref 21–32)
CREAT SERPL-MCNC: 1.21 MG/DL (ref 0.6–1.3)
EOSINOPHIL # BLD AUTO: 0 THOUSAND/ÂΜL (ref 0–0.61)
EOSINOPHIL NFR BLD AUTO: 0 % (ref 0–6)
ERYTHROCYTE [DISTWIDTH] IN BLOOD BY AUTOMATED COUNT: 17.8 % (ref 11.6–15.1)
GFR SERPL CREATININE-BSD FRML MDRD: 65 ML/MIN/1.73SQ M
GLUCOSE P FAST SERPL-MCNC: 119 MG/DL (ref 65–99)
GLUCOSE SERPL-MCNC: 111 MG/DL (ref 65–140)
GLUCOSE SERPL-MCNC: 119 MG/DL (ref 65–140)
GLUCOSE SERPL-MCNC: 139 MG/DL (ref 65–140)
GLUCOSE SERPL-MCNC: 160 MG/DL (ref 65–140)
GLUCOSE SERPL-MCNC: 163 MG/DL (ref 65–140)
HCT VFR BLD AUTO: 23.8 % (ref 36.5–49.3)
HGB BLD-MCNC: 7.1 G/DL (ref 12–17)
IMM GRANULOCYTES # BLD AUTO: 0.18 THOUSAND/UL (ref 0–0.2)
IMM GRANULOCYTES NFR BLD AUTO: 2 % (ref 0–2)
LYMPHOCYTES # BLD AUTO: 1.96 THOUSANDS/ÂΜL (ref 0.6–4.47)
LYMPHOCYTES NFR BLD AUTO: 17 % (ref 14–44)
MCH RBC QN AUTO: 26.3 PG (ref 26.8–34.3)
MCHC RBC AUTO-ENTMCNC: 29.8 G/DL (ref 31.4–37.4)
MCV RBC AUTO: 88 FL (ref 82–98)
MONOCYTES # BLD AUTO: 1.3 THOUSAND/ÂΜL (ref 0.17–1.22)
MONOCYTES NFR BLD AUTO: 11 % (ref 4–12)
NEUTROPHILS # BLD AUTO: 8.44 THOUSANDS/ÂΜL (ref 1.85–7.62)
NEUTS SEG NFR BLD AUTO: 70 % (ref 43–75)
NRBC BLD AUTO-RTO: 0 /100 WBCS
PLATELET # BLD AUTO: 476 THOUSANDS/UL (ref 149–390)
PMV BLD AUTO: 10.2 FL (ref 8.9–12.7)
POTASSIUM SERPL-SCNC: 3.8 MMOL/L (ref 3.5–5.3)
PROT SERPL-MCNC: 8.3 G/DL (ref 6.4–8.4)
RBC # BLD AUTO: 2.7 MILLION/UL (ref 3.88–5.62)
SODIUM SERPL-SCNC: 139 MMOL/L (ref 135–147)
WBC # BLD AUTO: 11.91 THOUSAND/UL (ref 4.31–10.16)

## 2022-12-28 RX ORDER — INSULIN GLARGINE 100 [IU]/ML
6 INJECTION, SOLUTION SUBCUTANEOUS
Status: DISCONTINUED | OUTPATIENT
Start: 2022-12-28 | End: 2022-12-31

## 2022-12-28 RX ORDER — SODIUM CHLORIDE 9 MG/ML
75 INJECTION, SOLUTION INTRAVENOUS CONTINUOUS
Status: DISCONTINUED | OUTPATIENT
Start: 2022-12-28 | End: 2022-12-28

## 2022-12-28 RX ADMIN — INSULIN LISPRO 6 UNITS: 100 INJECTION, SOLUTION INTRAVENOUS; SUBCUTANEOUS at 11:52

## 2022-12-28 RX ADMIN — PANTOPRAZOLE SODIUM 40 MG: 40 TABLET, DELAYED RELEASE ORAL at 05:28

## 2022-12-28 RX ADMIN — INSULIN LISPRO 1 UNITS: 100 INJECTION, SOLUTION INTRAVENOUS; SUBCUTANEOUS at 11:52

## 2022-12-28 RX ADMIN — INSULIN LISPRO 6 UNITS: 100 INJECTION, SOLUTION INTRAVENOUS; SUBCUTANEOUS at 07:52

## 2022-12-28 RX ADMIN — METHOCARBAMOL 500 MG: 500 TABLET ORAL at 17:23

## 2022-12-28 RX ADMIN — CALCIUM CARBONATE (ANTACID) CHEW TAB 500 MG 500 MG: 500 CHEW TAB at 15:50

## 2022-12-28 RX ADMIN — ATORVASTATIN CALCIUM 40 MG: 40 TABLET, FILM COATED ORAL at 07:53

## 2022-12-28 RX ADMIN — AMLODIPINE BESYLATE 5 MG: 5 TABLET ORAL at 17:23

## 2022-12-28 RX ADMIN — SIMETHICONE 80 MG: 80 TABLET, CHEWABLE ORAL at 21:56

## 2022-12-28 RX ADMIN — METHOCARBAMOL 500 MG: 500 TABLET ORAL at 07:52

## 2022-12-28 RX ADMIN — SIMETHICONE 80 MG: 80 TABLET, CHEWABLE ORAL at 11:58

## 2022-12-28 RX ADMIN — TAMSULOSIN HYDROCHLORIDE 0.4 MG: 0.4 CAPSULE ORAL at 15:50

## 2022-12-28 RX ADMIN — SIMETHICONE 80 MG: 80 TABLET, CHEWABLE ORAL at 07:52

## 2022-12-28 RX ADMIN — ASPIRIN 81 MG CHEWABLE TABLET 81 MG: 81 TABLET CHEWABLE at 07:52

## 2022-12-28 RX ADMIN — Medication 1 TABLET: at 07:52

## 2022-12-28 RX ADMIN — ACETAMINOPHEN 975 MG: 325 TABLET, FILM COATED ORAL at 21:55

## 2022-12-28 RX ADMIN — SODIUM CHLORIDE 75 ML/HR: 0.9 INJECTION, SOLUTION INTRAVENOUS at 05:25

## 2022-12-28 RX ADMIN — PANTOPRAZOLE SODIUM 40 MG: 40 TABLET, DELAYED RELEASE ORAL at 07:53

## 2022-12-28 RX ADMIN — PANTOPRAZOLE SODIUM 40 MG: 40 TABLET, DELAYED RELEASE ORAL at 15:50

## 2022-12-28 RX ADMIN — ERTAPENEM SODIUM 1000 MG: 1 INJECTION, POWDER, LYOPHILIZED, FOR SOLUTION INTRAMUSCULAR; INTRAVENOUS at 20:13

## 2022-12-28 RX ADMIN — INSULIN LISPRO 1 UNITS: 100 INJECTION, SOLUTION INTRAVENOUS; SUBCUTANEOUS at 17:23

## 2022-12-28 RX ADMIN — AMLODIPINE BESYLATE 5 MG: 5 TABLET ORAL at 07:59

## 2022-12-28 RX ADMIN — SIMETHICONE 80 MG: 80 TABLET, CHEWABLE ORAL at 17:23

## 2022-12-28 RX ADMIN — HEPARIN SODIUM 5000 UNITS: 5000 INJECTION INTRAVENOUS; SUBCUTANEOUS at 21:56

## 2022-12-28 RX ADMIN — MELATONIN TAB 3 MG 3 MG: 3 TAB at 21:56

## 2022-12-28 RX ADMIN — ACETAMINOPHEN 975 MG: 325 TABLET, FILM COATED ORAL at 05:28

## 2022-12-28 RX ADMIN — ONDANSETRON 4 MG: 4 TABLET, ORALLY DISINTEGRATING ORAL at 13:33

## 2022-12-28 RX ADMIN — HEPARIN SODIUM 5000 UNITS: 5000 INJECTION INTRAVENOUS; SUBCUTANEOUS at 05:28

## 2022-12-28 RX ADMIN — INSULIN GLARGINE 6 UNITS: 100 INJECTION, SOLUTION SUBCUTANEOUS at 21:56

## 2022-12-28 RX ADMIN — OXYCODONE HYDROCHLORIDE 10 MG: 10 TABLET ORAL at 21:55

## 2022-12-28 RX ADMIN — HEPARIN SODIUM 5000 UNITS: 5000 INJECTION INTRAVENOUS; SUBCUTANEOUS at 13:19

## 2022-12-28 RX ADMIN — CHOLECALCIFEROL TAB 10 MCG (400 UNIT) 400 UNITS: 10 TAB at 07:58

## 2022-12-28 NOTE — PCC NURSING
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 -s/p hepatic resection and reversal of colostomy on 11/7, s/p right hemicolectomy with partial descending colectomy colocolonic anastomosis with loop ileostomy and mid line abdominal VAC placement 11/16, Continue local wound care; WC following, CT  Abd/pelvis 12/17 = loculated collection along splenic recess  Has perisplenic hilum collection as well --> to IR 12/20 for drain placement in both perigastric and perisplenic area and cx sent from both areas = Ecoli, Flush drains with 10ml qd, 1/1/23 VICTORINO drainage = 0ml, S-O following and Dr Gabrielle Darnell was in to see pt 1/2/23, Tube check (incl perc alvina tube) done today this AM  Acute cholecystitis - s/p percutaneous tube placement 12/8 with tube check on 12/12, 12/14, GI saw due to alk phos elevation = felt 2/2 infiltrative disease rather than focal biliary obstruction  AMA was negative, Alkaline phosphatase isoenzyme sent 12/17 (48% liver/52% bone) = GI  recommended continuing to monitor his LFTs and defer to surgical oncology for further w/u and plan  Last 3 APs were [phone]  Will recheck 1/3, TB improving = to consider MRCP if total bili trends back up but was normal 12/30, Alvina tube = 20 ml 1/1/23  Drainage still has dark blood in it but seems to slowly becoming less, Perc alvina tube check done today 1/3, Did discuss alk phos levels with Dr Jaspal Bagley 1/1/23 = stable but he suggested a cholangiogram be done with the tube check which was relayed to Dr Anel Snyder over the weekend --> wasn't done today = d/w GI and they said do at next tube check in 2 weeks but will drop a note  They also wanted a GGT now  Bacteremia/abdominal abscess - ID following , s/p Ertapenem was switched to Doxycycline per ID as of 12/30, Blood cultures negative, CT abdomen and pelvis as above, + nausea since starting Doxy on 12/30    Lasts 1-2 hrs and was affecting appetite pt said, On 1/2/23, changed Zofran prn to be given scheduled pre-Doxy = was not effective however, Dr Beal Current changed back to Ertapenem today  Hypertension - On Norvasc 5mg BID, stable  Diabetes mellitus - On Lantus 10U qhs/Lispro 6U TID, Continue DM diet and QID Accuchecks/SSI, Increased Lantus to 10U qhs from 6U to start 12/31/22, Eating/meal completion is erratic and therefore BSs difficult, May have to consider stopping Lispro WM and increasing coverage scale instead  Acute on chronic renal failure - Stage III; baseline 1 2-1 4, Lisinopril currently on hold, s/p NSS IVF with improvement of creat =  back down to 1 2 12/30, Stable at 1 3 now  Anemia - Multifactorial due to recent infection, surgery, CKD stage III - Transfused on 12/15, 12/16/22 and 12/30, Hemoglobin 1/2/23 is stable at 8 1  Atypical chest pain - With elevation in troponin/EKG changes on acute side, Cardiology saw then and cleared pt for discharge, Did not recommend any further work up, No further chest pain  Situational depression - Neuropsychology consulted, Patient not interested in medications at this point in time  Malnutrition - Glucerna makes him nauseated, On 12/15, ordered double protein  Pleural effusion - CXR on 12/14 showed small left pleural effusion and was suspect for CHF, ECHO 11/9/22 = LVEF 55%, unable to assess diastolic function, mild TR  L sided pleuritic pain - located near drain site and per patient only occurs at night, No evidence of infection  1/11Pt awaiting discharge  Teaching reviewed w pt/family  Pt developed fever/chills  Blood cultures sent  IV ABx resumed IVF initiated    This week, we will continue to encourage independence with ADL's  We will continue to monitor labs and vital signs  We will continue to educate pt/family about repositioning to prevent skin breakdown  We will continue to monitor for constipation and medicate as ordered  We will continue to increase safety awareness with transfers and keep patient free from falls   Will continue to provide pt/family teaching with diabetic teaching  We will monitor incision(wound) for healing and s/s of healing  We will continue to monitor for adequate pain control  We will continue to teach pt/ family on care of ileostomy and drainage bags

## 2022-12-28 NOTE — PROGRESS NOTES
Progress Note - Infectious Disease   Juarez Ramos 61 y o  male MRN: [id number]  Unit/Bed#: -01 Encounter: [id number]      Impression/Recommendations:  Sepsis     Evolving 12/17:  WBC, HR   Suspect due to persistent left intra-abdominal infection in setting of complex history below, recently completed antibiotics   ROS and exam otherwise negative   Recent Flu/RSV/COVID PCR, CXR negative   Blood cultures negative   Improved with reinitiation of antibiotics, additional drain placement    Rec:  • Continue antibiotics as below  • Drains as below  • [address] [place] [address] Street  • Supportive care as per the primary service     Intra-abdominal abscess     In the setting complex surgical history as below   Initially developed 11/2022 companied by ESBL E  coli bacteremia   Status post exploratory laparotomy, right hemicolectomy, temporary abdominal closure 11/16; re-exploration, partial left colectomy, primary ileocolonic anastomosis with proximal diverting loop ileostomy, midline fascial closure on 11/17   More recently developed abscess in hepatectomy bed and collection +/- leak at area of prior colonic anastamosis, 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   Status post IR drain into perihepatic collection 12/8   Cultures with ESBL E  Coli   Status post 7 days ertapenem after drain placement through 12/15   Repeat CT 12/17 shows hepatic bed collection improved, persistent left intra-abdominal collection   Status post perigastic, perisplenic drains 12/20   Cultures again with ESBL E  coli  Rec:  • Continue ertapenem for 2 more days than transition to PO doxycycline  • Drains per IR - if outputs remain <10 cc, request tube check     Possible acute cholecystitis     Status post percutaneous cholecystostomy tube   Alk phos remains markedly elevated, possibly due to drain itself versus known intrahepatic metastases      Metastatic colon cancer   To liver, possible lung   Status post resection, chemotherapy, more recent hepatic resection and ablation, transverse colon resection      CKD   Baseline Cr 1 4     DM      Anemia   Status post multiple transfusions      Antibiotics:  Ertapenem #12    Subjective:  Patient seen on AM rounds  Feeling good today  Slept well and motivated to work with PT     24 Hour Events:  No documented fevers, chills, sweats, nausea, vomiting, or diarrhea  Objective:  Vitals:  Temp:  [97 8 °F (36 6 °C)-98 1 °F (36 7 °C)] 98 1 °F (36 7 °C)  HR:  [81-95] 85  Resp:  [17-18] 18  BP: (128-149)/(57-78) 131/73  SpO2:  [96 %-97 %] 96 %  Temp (24hrs), Av 9 °F (36 6 °C), Min:97 8 °F (36 6 °C), Max:98 1 °F (36 7 °C)  Current: Temperature: 98 1 °F (36 7 °C)    Physical Exam:   General:  No acute distress  Psychiatric:  Awake and alert  Pulmonary:  Normal respiratory excursion without accessory muscle use  Abdomen:  Soft, nontender  Extremities:  No edema  Skin:  No rashes    Lab Results:  I have personally reviewed pertinent labs  Results from last 7 days   Lab Units 22  0526 22  1522 22  0512   POTASSIUM mmol/L 3 8 3 9 4 1   CHLORIDE mmol/L 112* 110* 109*   CO2 mmol/L 21 17* 22   BUN mg/dL 21 23 25   CREATININE mg/dL 1 21 1 54* 1 41*   EGFR ml/min/1 73sq m 65 49 54   CALCIUM mg/dL 8 9 8 8 8 3   AST U/L 46* 51* 51*   ALT U/L 15 18 16   ALK PHOS U/L 952* 1,072* 983*     Results from last 7 days   Lab Units 22  0526 22  1522 22  0512   WBC Thousand/uL 11 91* 13 98* 12 30*   HEMOGLOBIN g/dL 7 1* 7 7* 7 3*   PLATELETS Thousands/uL 476* 515* 373           Imaging Studies:   I have personally reviewed pertinent imaging study reports and images in PACS  EKG, Pathology, and Other Studies:   I have personally reviewed pertinent reports

## 2022-12-28 NOTE — PROGRESS NOTES
PM&R PROGRESS NOTE:  Luis Po 61 y o  male MRN: [id number]  Unit/Bed#: -41 Encounter: [id number]        Rehabilitation Diagnosis: Impairment of mobility, safety, Activities of Daily Living (ADLs), and cognitive/communication skills due to Neurologic Conditions:  03 8  Neuromuscular Disorders Critical Illness Myopathy    SUBJECTIVE: Patient seen this afternoon and again in therapies  Feeling more encouraged about his bed mobility improving today  Target discharge date was discussed - patient with some anxiety became emotional, and shortly after discussion had 1 episode of vomiting  Continues to feel hot and having night sweats, cold during day  Left sided deep abdominal pain only in evenings/ovenight - may be position of tubing  Discussed with patient that tube check likely this wee for 2 drains draining much less now  Output from tubes:   Perc Alvina tube: 55cc  Abscess drain abdomen A: 0 cc  Abscess drain back: 18 cc yesterday, today 0 cc  Abscess drain abdomen B: 20cc      ASSESSMENT: Stable, progressing      PLAN:    Rehabilitation  • Functional deficits:  Decreased endurance, strength, impaired balance, impaired mobility/self care  • Continue current rehabilitation plan of care to maximize function  I personally attended, reviewed, and discussed medical and functional updates in team conference today  Please refer to advance care planning note for details  • Expected Discharge:  ELOS 10-12 days    Target date 1/9/23 with home care  o Education for wife  o Minimize tube  o Wound care education      Currently function:   Physical Therapy Occupational Therapy Speech Therapy   Weight Bearing Status: Full Weight Bearing  Transfers: Minimal Assistance  Bed Mobility: Moderate Assistance (S OOB using features of hospital bed; assist BLE to transfer backt to bed)  Amulation Distance (ft): 50 feet  Ambulation: Moderate Assistance  Assistive Device for Ambulation: Roller Walker  Number of Stairs: 2  Assistive Device for Stairs: Bilateral Hand Rails  Stair Assistance: Total Assistance (mod Ax2; drop in BP)  Discharge Recommendations: Home with:  [address] with[de-identified] First Floor Setup, Family Support, 24 Hour Assisteance, Home Physical Therapy   Eating: Supervision  Grooming: Supervision  Bathing: Moderate Assistance  Bathing: Moderate Assistance  Upper Body Dressing: Moderate Assistance  Lower Body Dressing: Assist of 2  Toileting: Assist of 2  Tub/Shower Transfer:  (N/A at this time, multiple drains/lines)  Toilet Transfer: Moderate Assistance  Cognition: Within Defined Limits  Orientation: Person, Place, Time, Situation                   DVT prophylaxis  • Managed on SQ Heparin    Bladder plan  • Continent    Bowel plan  • Continent ostomy output      * Malignant neoplasm of transverse colon (HCC)  Assessment & Plan  · Transverse colonic apple core lesion and innumerable hepatic metastases  · Colostomy 2/22  · RCW port-a-cath, accessed  · S/p palliative chemo  · Follows with Dr Kruse Pod (palliative)  · Follows hem/onc (Dr Tiffanie Gomez)    Abscess  Assessment & Plan  · Abdominal abscess- ESBL E Coli  · Zosyn completed  · Remains on Ertapenem per ID  With plan to transition to PO doxy in a few days  · Contact precautions  · Monitor 3 drain output:  · Abscess drain abdomen A: 0 cc  · Abscess drain back: 18 cc yesterday, today 0 cc    · Abscess drain abdomen B: 20cc    TUBE CHECK in AM     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 liver [facility])  Assessment & Plan  · MRI-multiple hepatic metastasis; smaller lesions in left lobe, larger lesions in right lobe  · 11/7 Exp Lap, resection of hepatic segment 3, resection of transverse colon with reversal of loop colostomy (Dr Petrona Foss)  · 11/16- right hemicolectomy, left discontinuity and temporary abdominal closure device placed  · 11/17- re-exploration, partial descending colectomy, primary colocolonic anastomosis with diverting loop ileostomy, midline VAC placement  · CT showed abdominal abscess and distended gallbladder  · 12/8- IR percutaneous cholecystostomy tube, hepatectomy abscess drain placement  · Monitor incisions, dressings  · Monitor CBC, CMP  · IM consulted for assistance with management  · Follow-up outpatient with hem/onc  ·  IR on 12/20 additional drains placed for a perisplenic abscess as well as a splenic recess abscess  · On abx     Sepsis ([address] )  Assessment & Plan  · SIRS versus sepsis at this time given his vitals, leukocytosis, and complicated infection history  · Mgmt per ID, IM  · 12/20-IR perisplenic, perigastric drain placement for 2 additional abscesses-  · Perigastric abscess +E  Coli ESBL  · Blood cultures negative     Acute pain  Assessment & Plan  Managed on Scheduled Tylenol 975 mg [id number]  Managed on Robaxin 500 mg BId  Oxycodone IR PRN  Moderately managed     Acute on chronic kidney failure (HCC)  Assessment & Plan  · Creatinine baseline 1 2-1 4  · Cr up to 1 41   · IVF per IM 12/26  · Monitor BMP    Bacteremia  Assessment & Plan  · Cleared   · Remains on Abx     Iron deficiency anemia, unspecified  Assessment & Plan  · Hbg = 7 1   · Type and Screen in AM   May require transfusion if patient consents   · 12/15-1 unit PRBc  · 12/16- symptomatic- 1 unit PRBc  · Monitor CBC    Obesity (BMI 30-39  9)  Assessment & Plan  · BMI 33 0  · Diet and lifestyle modifications  · Nutrition consult    Benign essential hypertension  Assessment & Plan  · Home: Lisinopril  · Here: Norvasc 5 mg BID  · Adjust medication as needed  · IM consult for assistance with management  · Monitored outpatient by PCP    Type 2 diabetes mellitus without complication, with long-term current use of insulin ([facility])  Assessment & Plan  · HbgA1c 8 0 (9/22)  · Home monitoring with Lynda Garay  · Home: Lantus 12 units, Humalog 3 units, Januvia  · Here: Humalog 6 units with meals, SSI, Lantus   · Follow with PCP (Dr Noemy Ayala)            [address] consultants medical co-management  Labs, medications, and imaging personally reviewed  ROS:  A ten point review of systems was completed on 12/28/22 and pertinent positives are listed in subjective section  All other systems reviewed were negative  OBJECTIVE:   /85 (BP Location: Right arm)   Pulse (!) 108   Temp 97 9 °F (36 6 °C) (Oral)   Resp 20   Ht 5' 10" (1 778 m)   Wt 104 kg (230 lb)   SpO2 95%   BMI 33 00 kg/m²     Physical Exam  Vitals and nursing note reviewed  Constitutional:       General: He is not in acute distress  HENT:      Head: Normocephalic and atraumatic  Nose: Nose normal       Mouth/Throat:      Mouth: Mucous membranes are moist    Eyes:      Conjunctiva/sclera: Conjunctivae normal    Cardiovascular:      Rate and Rhythm: Normal rate and regular rhythm  Pulses: Normal pulses  Pulmonary:      Effort: Pulmonary effort is normal       Breath sounds: Normal breath sounds  No wheezing or rales  Abdominal:      General: Bowel sounds are normal  There is no distension  Palpations: Abdomen is soft  Tenderness: There is no abdominal tenderness  Musculoskeletal:         General: No swelling  Cervical back: Neck supple  Skin:     General: Skin is warm  Comments: Large abdominal incision healing  3 drains   1 perc radha tube  +ileostomy   Neurological:      Mental Status: He is alert and oriented to person, place, and time  Motor: Weakness (decreased endurance and proximal weakness) present     Psychiatric:      Comments: Emotional/mild anxiety          Lab Results   Component Value Date    WBC 11 91 (H) 12/28/2022    HGB 7 1 (L) 12/28/2022    HCT 23 8 (L) 12/28/2022    MCV 88 12/28/2022     (H) 12/28/2022     Lab Results   Component Value Date    SODIUM 139 12/28/2022    K 3 8 12/28/2022     (H) 12/28/2022    CO2 21 12/28/2022    BUN 21 12/28/2022    CREATININE 1 21 12/28/2022    GLUC 119 12/28/2022    CALCIUM 8 9 12/28/2022     Lab Results Component Value Date    INR 1 12 12/19/2022    INR 1 10 11/12/2022    INR 1 24 (H) 11/09/2022    PROTIME 14 7 (H) 12/19/2022    PROTIME 14 4 11/12/2022    PROTIME 15 8 (H) 11/09/2022           Current Facility-Administered Medications:   •  acetaminophen (TYLENOL) tablet 975 mg, 975 mg, Oral, Q8H [address], GAURANG Keith, 975 mg at 12/28/22 6931  •  amLODIPine (NORVASC) tablet 5 mg, 5 mg, Oral, BID, GAURANG Keith, 5 mg at 12/28/22 1723  •  aspirin chewable tablet 81 mg, 81 mg, Oral, Daily, GAURANG Keith, 81 mg at 12/28/22 9933  •  atorvastatin (LIPITOR) tablet 40 mg, 40 mg, Oral, Daily, GAURANG Keith, 40 mg at 12/28/22 0753  •  calcium carbonate (TUMS) chewable tablet 500 mg, 500 mg, Oral, BID PRN, GAURANG Keith, 500 mg at 12/28/22 1550  •  cholecalciferol (VITAMIN D3) tablet 400 Units, 400 Units, Oral, Daily, GAURANG Keith, 400 Units at 12/28/22 0758  •  ertapenem (INVanz) 1,000 mg in sodium chloride 0 9 % 50 mL IVPB, 1,000 mg, Intravenous, Q24H, Uma Valentino MD, Last Rate: 100 mL/hr at 12/28/22 2013, 1,000 mg at 12/28/22 2013  •  heparin (porcine) subcutaneous injection 5,000 Units, 5,000 Units, Subcutaneous, Q8H [address], GAURANG Keith, 5,000 Units at 12/28/22 1319  •  insulin glargine (LANTUS) subcutaneous injection 12 Units 0 12 mL, 12 Units, Subcutaneous, HS, GAURANG Diggs, 12 Units at 12/27/22 2115  •  insulin lispro (HumaLOG) 100 units/mL subcutaneous injection 1-5 Units, 1-5 Units, Subcutaneous, HS, GAURANG Keith, 1 Units at 12/22/22 2130  •  insulin lispro (HumaLOG) 100 units/mL subcutaneous injection 1-6 Units, 1-6 Units, Subcutaneous, TID AC, 1 Units at 12/28/22 1723 **AND** Fingerstick Glucose (POCT), , , TID AC, GAURANG Keith  •  insulin lispro (HumaLOG) 100 units/mL subcutaneous injection 6 Units, 6 Units, Subcutaneous, TID With Meals, GAURANG Diggs, 6 Units at 12/28/22 1152  •  iohexol (OMNIPAQUE) 240 MG/ML solution 50 mL, 50 mL, Oral, 90 min pre-procedure, Coco Baer MD  •  melatonin tablet 3 mg, 3 mg, Oral, HS, GAURANG Keith, 3 mg at 12/27/22 2116  •  methocarbamol (ROBAXIN) tablet 500 mg, 500 mg, Oral, BID, GAURANG Keith, 500 mg at 12/28/22 1723  •  multivitamin-minerals (CENTRUM) tablet 1 tablet, 1 tablet, Oral, Daily, GAURANG Keith, 1 tablet at 12/28/22 6703  •  ondansetron (ZOFRAN-ODT) dispersible tablet 4 mg, 4 mg, Oral, Q6H PRN, GAURANG Keith, 4 mg at 12/28/22 1333  •  oxyCODONE (ROXICODONE) immediate release tablet 10 mg, 10 mg, Oral, Q6H PRN, GAURANG Keith, 10 mg at 12/27/22 2115  •  oxyCODONE (ROXICODONE) IR tablet 2 5 mg, 2 5 mg, Oral, Q3H PRN, GAURANG Keith, 2 5 mg at 12/25/22 1911  •  oxyCODONE (ROXICODONE) IR tablet 5 mg, 5 mg, Oral, Q6H PRN, GAURANG Keith, 5 mg at 12/26/22 2106  •  pantoprazole (PROTONIX) EC tablet 40 mg, 40 mg, Oral, BID AC, GAURANG Keith, 40 mg at 12/28/22 1550  •  simethicone (MYLICON) chewable tablet 80 mg, 80 mg, Oral, 4x Daily (with meals and at bedtime), GAURANG Keith, 80 mg at 12/28/22 1723  •  tamsulosin (FLOMAX) capsule 0 4 mg, 0 4 mg, Oral, Daily With Dinner, GAURANG Zambrano, 0 4 mg at 12/28/22 1550    Past Medical History:   Diagnosis Date   • Abdominal pain 03/12/2022   • Acute renal failure ([facility] )     [id number] resolved   • Cancer ([facility] )    • Diabetes mellitus ([facility] )    • Enteritis 08/23/2016   • Gastroparesis due to DM ([place] [address] ) 08/23/2016   • GERD (gastroesophageal reflux disease)    • Hernia, ventral 08/04/2016   • Hyperlipidemia    • Hypertension    • Morbid obesity ([place] [address] ) 04/17/2018   • Postoperative visit 03/02/2022   • SIRS (systemic inflammatory response syndrome) ([place] [address] ) 03/12/2022   • Snoring    • Stage 3a chronic kidney disease ([place] [address] ) 02/19/2022       Patient Active Problem List    Diagnosis Date Noted   • Malignant neoplasm of transverse colon ([place] [id number] ) 03/01/2022   • Abscess 12/27/2022   • 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 liver ([place] [address] ) 03/02/2022   • Sepsis ([place] [address] ) 12/17/2022   • Acute pain 12/27/2022   • Severe protein-calorie malnutrition ([place] [address] ) 12/14/2022   • Acute on chronic kidney failure ([facility] ) 12/14/2022   • Flash pulmonary edema ([place] [address] ) 11/12/2022   • MR (mitral regurgitation) 11/12/2022   • Bacteremia 11/12/2022   • ESBL (extended spectrum beta-lactamase) producing bacteria infection 11/12/2022   • Acute respiratory failure with hypoxia ([place] [address] ) 11/12/2022   • Encephalopathy 11/09/2022   • Cervical radiculopathy 10/26/2022   • Other fatigue 06/15/2022   • Colostomy prolapse ([place] [address] ) 05/27/2022   • Colon cancer metastasized to liver ([place] [address] ) 03/12/2022   • Iron deficiency anemia, unspecified 03/01/2022   • Thrombocytosis 02/21/2022   • Hypokalemia 02/19/2022   • Transaminitis 02/19/2022   • Type 2 diabetes mellitus with hyperlipidemia ([place] [address] ) 02/18/2022   • Colonic mass 02/18/2022   • Microcytic anemia 02/18/2022   • Left ureteral calculus 01/30/2020   • Incarcerated umbilical hernia 60/91/5522   • Testicular hypogonadism 06/19/2017   • Low testosterone 05/30/2017   • Type 2 diabetes mellitus without complication, with long-term current use of insulin ([place] [address] ) 08/23/2016   • Benign essential hypertension 08/23/2016   • Mixed hyperlipidemia 08/23/2016   • Erectile dysfunction 07/11/2016   • Obesity (BMI 30-39 9) 07/11/2016          Chris Camejo MD    Total time spent:  45 minutes with more than 50% spent counseling/coordinating care  Counseling includes discussion with patient re: progress and discussion with patient of his/her current medical/functional state/information  Coordination of patient's care was performed in conjunction with consulting services  Time invested included review of patient's labs, vitals, and management of their comorbidities with continued monitoring  The care of the patient was extensively discussed and appropriate treatment plan was formulated unique for this patient       Personally updated his wife today over the phone      ** Please Note:  voice to text software may have been used in the creation of this document   Although proof errors in transcription or interpretation are a potential of such software**

## 2022-12-28 NOTE — PROGRESS NOTES
12/28/22 1250   Pain Assessment   Pain Assessment Tool 0-10   Pain Score No Pain   Restrictions/Precautions   Precautions Fall Risk;Contact/isolation;Multiple lines;Supervision on toilet/commode;Pain  (Dillan drain, ileostomy, IR Drain)   Subjective   Subjective "I'm going to need a minute"   Sit to Lying   Type of Assistance Needed Supervision; Adaptive equipment   Physical Assistance Level No physical assistance   Comment heavy use of bed railing and HOB slightly elevated   Sit to Lying CARE Score 4   Lying to Sitting on Side of Bed   Type of Assistance Needed Supervision; Adaptive equipment   Physical Assistance Level No physical assistance   Comment heavy use of bed railing and HOB slightly elevated   Lying to Sitting on Side of Bed CARE Score 4   Sit to Stand   Type of Assistance Needed Physical assistance   Physical Assistance Level 26%-50%   Comment overall CGA but needed mod A on 1 occurance to boost 2* fatigue; 1x lateral LOB but able to mostly self correct   Sit to Stand CARE Score 3   Bed-Chair Transfer   Type of Assistance Needed Physical assistance   Physical Assistance Level 25% or less   Comment Ada SPT with RW   Chair/Bed-to-Chair Transfer CARE Score 3   Transfer Bed/Chair/Wheelchair   Limitations Noted In Endurance;LE Strength;Balance   Adaptive Equipment Roller Walker   Car Transfer   Comment (S)  trial upcoming sessions   Walk 10 Feet   Type of Assistance Needed Physical assistance   Physical Assistance Level 26%-50%   Comment RW   Walk 10 Feet CARE Score 3   Ambulation   Primary Mode of Locomotion Prior to Admission Walk   Distance Walked (feet) 10 ft  (+ 15)   Assist Device Roller Walker   Gait Pattern Shuffle;Slow Josefina;Decreased foot clearance   Limitations Noted In Endurance; Heel Strike;Speed;Swing;Strength   Provided Assistance with: Balance   Findings 1x walk from bed to sink in bathroom however needed seated 2* dizziness and tachycardia feeling SOB (patient admittedly anxious regarding d/c planning discussion had earlier in session with PT and Dr Perry Oneil)  Patient performed PLB and reported cessation of symptoms  Later in session, patient ambulated 13' with RW and reported a severity of 5/10 in regards to dizziness and needed to sit  He also reported need for increased reliance of UE using walker and needed to sit from weakness in legs   Does the patient walk? 2  Yes   Wheelchair mobility   Findings W/C goals placed this session- notifed him that PT will place order for w/c for > HH distances, when fatigued, for appointments and for community mobility  Patient agreeable  Will confirm with wife  Patient will then need to (I) purchase RW   Toilet Transfer   Findings Patient emptied ostomy bag at the start of session (500 mL) and was able to self manage  Stood to urinate at the end of session 375 mL- charted   Therapeutic Interventions   Other Took patient outside for 10 minutes of PLB outside in fresh air and to boost mood   Other Comments   Comments /67 sitting  bpm; Standing 110/61 127 bpm   Assessment   Treatment Assessment Patient engaged in PT treatment session focused on discharge planning  At the start of session, Dr Perry Oneil present  Along with PT, patient made aware of [facility] set for 1/9/22  Patient stated feeling "emotionally confused" with fear being his number one emotion  Following conversation, patient feeling more comfortable with this goal  He did however throw up lunch likely 2* being worked up, etc; able to continue participation post activity and RN present, aware, and provided Zofran  Call placed to wife who was additionally made aware of this plan  She is going to come in next MWF (1/2,4,6th) from 9:30-12:00 for RN, PT and OT family training  Discussion held with him (will update wife) that therapy will place order for hospital bed, BSC and w/c  He would (I) be responsible for purchasing a RW   End of this week and weekend will be focused on stair training as patient has 2 ADRIAN with single sided railing as this is one of his biggest barriers at this time  Additional goals are to focus LE strength, activity tolerance and ambulation  Problem List Decreased strength;Decreased endurance;Decreased mobility; Impaired balance;Orthopedic restrictions;Pain   Barriers to Discharge Inaccessible home environment;Decreased caregiver support   PT Barriers   Functional Limitation Car transfers; Ramp negotiation;Stair negotiation;Standing;Transfers; Walking   Plan   Treatment/Interventions Functional transfer training;LE strengthening/ROM; Therapeutic exercise; Endurance training;Cognitive reorientation;Patient/family training;Gait training;Bed mobility; Equipment eval/education; Compensatory technique education   Progress Progressing toward goals   Recommendation   PT Discharge Recommendation Home with home health rehabilitation   PT Therapy Minutes   PT Time In 1250   PT Time Out 1430   PT Total Time (minutes) 100   PT Mode of treatment - Individual (minutes) 100   PT Mode of treatment - Concurrent (minutes) 0   PT Mode of treatment - Group (minutes) 0   PT Mode of treatment - Co-treat (minutes) 0   PT Mode of Treatment - Total time(minutes) 100 minutes   PT Cumulative Minutes 133

## 2022-12-28 NOTE — PROGRESS NOTES
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  • s/p hepatic resection and reversal of colostomy on 11/7  • s/p right hemicolectomy with partial descending colectomy colocolonic anastomosis with loop ileostomy and mid line abdominal VAC placement 11/16  • Continue local wound care; WC following  • CT  Abd/pelvis 12/17 = loculated collection along splenic recess   Has perisplenic hilum collection as well --> to IR 12/20 for drain placement in both perigastric and perisplenic area and cx sent from both areas = Ecoli  • Flush drains with 10ml qd; tube check 2 weeks 1/3/23  • 12/27 no drainage from any JPs but today there is milky tan drainage in the oldest bulb as before  • S-O following      Acute cholecystitis  • s/p percutaneous tube placement 12/8 with tube check on 12/12, 12/14  • GI saw due to alk phos elevation = felt 2/2 infiltrative disease rather than focal biliary obstruction   AMA was negative     • Alkaline phosphatase isoenzyme sent 12/17 = GI aware results  They recommended continuing to monitor his LFTs and defer to surgical oncology for further w/u and plan  • LFT improving  • Consider MRCP if total bili trends back up  • Monitor biweekly or as needed  • Alvina tube = 10ml 12/27     Bacteremia/abdominal abscess  • ID following and continuing Ertapenem thru 12/30 then switch to doxycycline  • Blood cultures negative    • CT abdomen and pelvis as above  • Has ongoing night sweats     Hypertension  • On Norvasc 5mg BID  • stable     Diabetes mellitus  • On Lantus 12U qhs/Lispro 6U TID  • Continue DM diet and QID Accuchecks/SSI  • BS stable no changes today     Acute on chronic renal failure  • Stage III; baseline 1 2-1 4  • Lisinopril currently on hold  • On NSS IVF with improvement of creat = will stop for now since creat back down to 1 2     Anemia  • Multifactorial due to recent infection, surgery, CKD stage III  • Transfused on 12/15 for hemoglobin 7 3 and 12/16/22 for hgb of 8   • No transfusion for now per pt request but agreeable if it drops below 7  • Today is 7 1  • Will recheck in AM and likely need to transfuse     Atypical chest pain  • With elevation in troponin/EKG changes  • Cardiology cleared pt for discharge  • Did not recommend any further work up  • No further chest pain     Situational depression  • Neuropsychology consulted  • Patient not interested in medications at this point in time     Malnutrition  • Recommend dietician consultation to optimize wound healing  • Glucerna makes him nauseated  • On 12/15, ordered double protein  • He will bring supplement from home     Pleural effusion  • CXR on 12/14 showed small left pleural effusion and was suspect for CHF  • ECHO 11/9/22 = LVEF 55%, unable to assess diastolic function, mild TR     L sided pleuritic pain  • located near drain site and says only occurs at night he says  • No evidence of infection        Discharge date:  1/9/23       The above assessment and plan was reviewed and updated as determined by my evaluation of the patient on 12/28/2022      Labs:   Results from last 7 days   Lab Units 12/28/22  0526 12/27/22  1522   WBC Thousand/uL 11 91* 13 98*   HEMOGLOBIN g/dL 7 1* 7 7*   HEMATOCRIT % 23 8* 24 7*   PLATELETS Thousands/uL 476* 515*     Results from last 7 days   Lab Units 12/28/22  0526 12/27/22  1522   SODIUM mmol/L 139 136   POTASSIUM mmol/L 3 8 3 9   CHLORIDE mmol/L 112* 110*   CO2 mmol/L 21 17*   BUN mg/dL 21 23   CREATININE mg/dL 1 21 1 54*   CALCIUM mg/dL 8 9 8 8             Results from last 7 days   Lab Units 12/28/22  1041 12/28/22  0642 12/27/22  2105   POC GLUCOSE mg/dl 160* 111 117       Review of Scheduled Meds:  Current Facility-Administered Medications   Medication Dose Route Frequency Provider Last Rate   • acetaminophen  975 mg Oral Q8H [facility] GAURANG Keith     • amLODIPine  5 mg Oral BID GAURANG Glover     • aspirin  81 mg Oral Daily GAURANG Keith     • atorvastatin  40 mg Oral Daily GAURANG Keith     • calcium carbonate  500 mg Oral BID PRN GAURANG Glover     • cholecalciferol  400 Units Oral Daily GAURANG Keith     • ertapenem  1,000 mg Intravenous Q24H Ivett Delgadillo MD Stopped (12/27/22 2120)   • heparin (porcine)  5,000 Units Subcutaneous Q8H [facility] GAURANG Keith     • insulin glargine  12 Units Subcutaneous HS GAURANG Zurita     • insulin lispro  1-5 Units Subcutaneous HS GAURANG Keith     • insulin lispro  1-6 Units Subcutaneous TID AC GAURANG Keith     • insulin lispro  6 Units Subcutaneous TID With Meals GAURANG Zurita     • iohexol  50 mL Oral 90 min pre-procedure Ivett Delgadillo MD     • melatonin  3 mg Oral HS GAURANG Keith     • methocarbamol  500 mg Oral BID GAURANG Glover     • multivitamin-minerals  1 tablet Oral Daily GAURANG Keith     • ondansetron  4 mg Oral Q6H PRN GUARANG Glover     • oxyCODONE  10 mg Oral Q6H PRN GAURANG Keith     • oxyCODONE  2 5 mg Oral Q3H PRN GAURANG Glover     • oxyCODONE  5 mg Oral Q6H PRN GAURANG Keith     • pantoprazole  40 mg Oral BID AC GAURANG Keith     • simethicone  80 mg Oral 4x Daily (with meals and at bedtime) GAURANG Glover     • sodium chloride  75 mL/hr Intravenous Continuous GAURANG Barillas 75 mL/hr (12/28/22 0525)   • tamsulosin  0 4 mg Oral Daily With Dinner GAURANG Glover         Subjective/ HPI: Patient seen and examined  Patients overnight issues or events were reviewed with nursing or staff during rounds or morning huddle session  New or overnight issues include the following:     Elevated creat improving    Hemoglobin is 7 1 today    ROS:   A 10 point ROS was performed; negative except as noted above        *Labs /Radiology studies reviewed  *Medications reviewed and reconciled as needed  *Please refer to order section for additional ordered labs studies  *Case discussed with primary attending during morning huddle case rounds    Physical Examination:  Vitals:   Vitals:    12/27/22 1725 12/27/22 2006 12/28/22 0608 12/28/22 0759   BP: 137/78 149/77 128/74 131/73   BP Location:  Left arm Left arm Left arm   Pulse:  95 84 85   Resp:  18 17 18   Temp:  97 9 °F (36 6 °C) 98 1 °F (36 7 °C)    TempSrc:  Oral Oral    SpO2:  97% 96%    Weight:       Height:           General Appearance: no distress, conversive  HEENT: PERRLA, conjuctiva normal; oropharynx clear; mucous membranes moist   Neck:  Supple, normal ROM  Lungs: CTA, normal respiratory effort, no retractions, expiratory effort normal  CV: regular rate and rhythm; no rubs/murmurs/gallops, PMI normal   ABD: soft; ND/NT; +BS  EXT: no edema  Skin: normal turgor, normal texture, no rashes  Psych: affect normal, mood normal  Neuro: AAO      The above physical exam was reviewed and updated as determined by my evaluation of the patient on 12/28/2022  Invasive Devices     Central Venous Catheter Line  Duration           Port A Cath 03/10/22 Right Chest 293 days          Drain  Duration           Ileostomy LUQ 40 days    Abscess Drain Abdomen 19 days    Cholecystostomy Tube 15 days    Abscess Drain Abdomen 7 days    Abscess Drain Back 7 days                   VTE Pharmacologic Prophylaxis: Heparin  Code Status: Level 1 - Full Code  Current Length of Stay: 14 day(s)      Total time spent:  30 minutes with more than 50% spent counseling/coordinating care  Counseling includes discussion with patient re: progress  and discussion with patient of his/her current medical state/information  Coordination of patient's care was performed in conjunction with primary service   Time invested included review of patient's labs, vitals, and management of their comorbidities with continued monitoring  In addition, this patient was discussed with medical team including physician and advanced extenders  The care of the patient was extensively discussed and appropriate treatment plan was formulated unique for this patient  Medical decision making for the day was made by supervising physician unless otherwise noted in their attestation statement  ** Please Note:  voice to text software may have been used in the creation of this document   Although proof errors in transcription or interpretation are a potential of such software**

## 2022-12-28 NOTE — TEAM CONFERENCE
Acute [facility] Conference Note  Date: 12/28/2022   Time: 10:59 AM       Patient Name:  Raissa Petty       Medical Record Number: [id number]   YOB: 1965  Sex: Male          Room/Bed:  [facility]4/[facility] 964-01  Payor Info:  Payor: Krystal Kaiser / Plan: Krystal Kaiser / Product Type: HMO Commercial /      Admitting Diagnosis: Critical illness myopathy [G72 81]   Admit Date/Time:  12/14/2022  3:18 PM  Admission Comments: No comment available     Primary Diagnosis:  Malignant neoplasm of transverse colon ([facility] )  Principal Problem: Malignant neoplasm of transverse colon [facility])    Patient Active Problem List    Diagnosis Date Noted   • Abscess 12/27/2022   • Acute pain 12/27/2022   • Sepsis ([facility] ) 12/17/2022   • Severe protein-calorie malnutrition ([facility] ) 12/14/2022   • Acute on chronic kidney failure ([facility] ) 12/14/2022   • Flash pulmonary edema ([facility] ) 11/12/2022   • MR (mitral regurgitation) 11/12/2022   • Bacteremia 11/12/2022   • ESBL (extended spectrum beta-lactamase) producing bacteria infection 11/12/2022   • Acute respiratory failure with hypoxia ([facility] ) 11/12/2022   • Encephalopathy 11/09/2022   • Cervical radiculopathy 10/26/2022   • Other fatigue 06/15/2022   • Colostomy prolapse ([facility] ) 05/27/2022   • Colon cancer metastasized to liver ([facility] ) 03/12/2022   • 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 liver ([facility] ) 03/02/2022   • Iron deficiency anemia, unspecified 03/01/2022   • Malignant neoplasm of transverse colon ([facility] ) 03/01/2022   • Thrombocytosis 02/21/2022   • Hypokalemia 02/19/2022   • Transaminitis 02/19/2022   • Type 2 diabetes mellitus with hyperlipidemia ([facility] ) 02/18/2022   • Colonic mass 02/18/2022   • Microcytic anemia 02/18/2022   • Left ureteral calculus 01/30/2020   • Incarcerated umbilical hernia 67/83/3079   • Testicular hypogonadism 06/19/2017   • Low testosterone 05/30/2017   • Type 2 diabetes mellitus without complication, with long-term current use of insulin ([facility] ) 08/23/2016   • Benign essential hypertension 08/23/2016   • Mixed hyperlipidemia 08/23/2016   • Erectile dysfunction 07/11/2016   • Obesity (BMI 30-39 9) 07/11/2016       Physical Therapy:    Weight Bearing Status: Full Weight Bearing  Transfers: Minimal Assistance  Bed Mobility: Moderate Assistance (S OOB using features of hospital bed; assist BLE to transfer backt to bed)  Amulation Distance (ft): 50 feet  Ambulation: Moderate Assistance  Assistive Device for Ambulation: Roller Walker  Number of Stairs: 2  Assistive Device for Stairs: Bilateral Hand Rails  Stair Assistance: Total Assistance (mod Ax2; drop in BP)  Discharge Recommendations: Home with:  [address] with[de-identified] First Floor Setup, Family Support, 24 Hour Assisteance, Home Physical Therapy    Patient making progress with skilled PT intervention thusfar however still remains with barriers of medical deconditioning, pain, fatigue, LE weakness and imbalance  These deficits negatively impact patient's ability to engage in acute rehab program, however improving since evaluation  These deficits also negatively impact his ability to perform funcitonal mobility, requrining min to mod A depending on fatigue level and time of day  It also impacts his ability to perform stairs  Patient has 2 vs 3 ADRIAN home and a FF to second floor  Patient able to complete 2 steps 12/27/22 at moderate Ax2 but was limited by symptomatic drop in BP with dizziness ( 128/78 116 bpm seated; 95/65 125 bpm standing)  Last week, PT spoke with wife and encouraged ramping and a first floor set up  Family will need to be brought in for FT from a therapy and nursing perspective  Anticipate patient would functionally benefit from continued skilled PT intervention additional 10-14 days with a plan for a first floor and assist at home  Occupational Therapy:  Eating: Supervision  Grooming: Supervision  Bathing: Moderate Assistance  Bathing: Moderate Assistance  Upper Body Dressing:  Moderate Assistance  Lower Body Dressing: Assist of 2  Toileting: Assist of 2  Tub/Shower Transfer:  (N/A at this time, multiple drains/lines)  Toilet Transfer: Moderate Assistance  Cognition: Within Defined Limits  Orientation: Person, Place, Time, Situation  Discharge Recommendations: Home with:  [address] with[de-identified] Family Support       Occupational Therapy Weekly Team Note    Pt is making slow but steady progress with skilled occupational therapy intervention and is progressing toward long term goals for ADL, IADL's, and functional transfers/mobility  Pts long term goals for ADLs are supervision with [address] [place] [address]  Pt continues to present with impairments in activity tolerance, endurance, standing balance/tolerance, sitting balance/tolerance, UE strength, UE ROM, FMC, and memory  Occupational performance remains limited by fatigue, SOB, LOVETT, pain, risk for falls, and multiple lines   Family training/education will be required prior to D/C  Pt will continue to benefit from skilled acute rehab OT services to address above mentioned barriers and maximize functional independence in baseline areas of occupation to meet established treatment goals with overall decreased burden of care  Plan of care to continue to focus on ADL Retraining , LB Dressing, UB dressing, Medication management , Functional Transfers, Functional Cognition, Functional Attention, Standing tolerance, Standing balance , DME training/education, Family training/education, Energy conservation training/education, healthy coping education, Leisure and social pursuits, sitting balance, and Core/trunk control/strengthening   Goals for the upcoming week are: continue to monitor basic vitals during ADLs and functional transfers, basic ADL participation , and Out of bed tolerance  Anticipate Re-team at this time  Ana Nogueira                   Speech Therapy:           No notes on file    Nursing Notes:  Appetite: Good  Diet Type: Diabetic                      Diet Patient/Family Education Complete: Yes    Type of Wound (LDA): Wound                    Type of Wound Patient/Family Education: Yes  Bladder: Continent     Bladder Patient/Family Education: Yes  Bowel: Ostomy     Bowel Patient/Family Education: Yes  Pain Location/Orientation: Location: Generalized  Pain Score: 0                       Hospital Pain Intervention(s): Medication (See MAR), Rest  Pain Patient/Family Education: No (ongoing)  Medication Management/Safety  Injectable: Insulin (ongoing)  Safe Administration: Yes  Medication Patient/Family Education Complete: Yes (ongoing)    Patient diagnosed transverse colon cancer with metastasis to liver s/p hepatic resection and reversal of colostomy on 11/7 11/16: Patient returned to the OR for right hemicolectomy with partial descending colectomy colocolonic anastomosis with loop ileostomy and mid line abdominal VAC placement  local wound care is to be  continued  Ileostomy output is stable  12/17: patient had CT Abd/pelvis done and results showed loculated collection along splenic recess and perisplenic hilum collection  12/20 patient sent to IR for drain placement in both perigastric and perisplenic area and cx sent from both areas = Ecoli  Flush drains with 10ml qd;  1/3/23:tubes to be checked  Surgery and Surgical oncology is following and monitoring drain output  12/8:Patient also has Acute cholecystitis s/p percutaneous and tube placement  done with tube check on  and GI following due to LFT   Using oxycodone prn pain  IV ABX resumed- ID following  12/12-12/14 tubes  checked and elevation = felt alkaline phosphatase elevated 2/2 infiltrative disease rather than focal biliary obstruction  Akiko Blight AMA was negative  Alkaline phosphatase isoenzyme sent 12/17 = GI aware that has resulted  They recommended continuing to monitor his LFTs and defer to surgical oncology for further w/u and plan  Patients's LFT seem to be plateuing  Consider MRCP if total bili trends back up    He is to be Monitor biweekly or as needed  Bacteremia/abdominal abscess ID following and continuing Ertapenem thru 12/30 then switch to doxycycline  Blood cultures negative   CT abdomen and pelvis as above He has ongoing night sweats  Patient is Hypertensive nn Norvasc 5mg BID and is stable  Patient has Diabetes mellitus and on Lantus 12U qhs/Lispro 6U TID  He is to continue diabetic diet and QID Accuchecks with SSI  BS stable no changes today  Acute on chronic renal failure,Stage III; baseline 1 2-1  4  Lisinopril currently on hold, S/p 1 liter IVF 12/26  Patients Anemia is Multifactorial due to recent infection, surgery, CKD stage III  He wasTransfused on 12/15 for hemoglobin 7 3 and 12/16/22 for hgb of 8 12/26: hgb 7 3 No transfusion for now per pt request He is agreeable if it drops below 7  Repeat labs in am Patient has Atypical chest pain with elevation in troponin/EKG changes  Cardiology cleared pt for discharge and did not recommend further work up  No further chest pain  Patient had Situational depression  Neuropsychology consulted and Patient not interested in medications at this point in time  Patient has malnutrition and dietician consultation to optimize wound healing  Glucerna makes him nauseated  On 12/15, ordered double protein  He will bring supplement from home  Pleural effusion: CXR on 12/14 showed small left pleural effusion and was suspect for CHF  ECHO 11/9/22 = LVEF 55%, unable to assess diastolic function, mild TR  L sided pleuritic pain, right at drain site with no evidence of infection  It is worse with deep breath  May need repeat Cxray  This week, we will encourage independence with ADL's  We will monitor labs and vital signs  We will educate pt/family about repositioning to prevent skin breakdown  We will monitor for constipation and medicate as ordered  We will increase safety awareness with transfers and keep patient free from falls  Will provide pt/family teaching with diabetic teaching   We will monitor incision(wound) for healing and s/s of healing  We will monitor for adequate pain control  We will teach pt/ family on care of ileostomy and drainage bags  Case Management:     Discharge Planning  Living Arrangements: Lives w/ Spouse/significant other, Lives w/ Children  Support Systems: Spouse/significant other, Son, Daughter  Assistance Needed: tbd  Type of Current Residence: Private residence  Current Home Care Services: No  Pt has been able to improve functionally and therapy has had discussions about pt returning home at w/c level  Team to discuss dc date once medical stability is known  Following to assist w/dc planning needs  Is the patient actively participating in therapies? yes  List any modifications to the treatment plan:     Barriers Interventions   Drain mgmt, abx, ostomy, port  Family education/training, [facility] services   pain Medications, repositioning   Weakness, balance, overall mobility Therapy exercises, use of AD   Activity tolerance, stairs Energy conservation education, therapy stair training         Is the patient making expected progress toward goals?  yes  List any update or changes to goals:     Medical Goals: Patient will be medically stable for discharge to [facility] upon completion of rehab program and Patient will be able to manage medical conditions and comorbid conditions with medications and follow up upon completion of rehab program    Weekly Team Goals:   Rehab Team Goals  ADL Team Goal: Patient will require assist with ADLs with least restrictive device upon completion of rehab program  Bowel/Bladder Team Goal: Patient will require assist with bladder/bowel management with least restrictive device upon completion of rehab program  Transfer Team Goal: Patient will be independent with transfers with least restrictive device upon completion of rehab program  Locomotion Team Goal: Patient will be independent with locomotion with least restrictive device upon completion of rehab program    Discussion: pt presents with the above barriers and is able to navigate the bed independently as of today  Pt will require a hospital bed for repositioning and use of rails to be independent  Overall adls min to mod a  Team anticipates another week with return home  Wife to be brought in for training and education  Recommendations are for [facility] for rn pt and ot services  Anticipated Discharge Date:  1/9/2023  [facility] Team Members Present: The following team members are supervising care for this patient and were present during this Weekly Team Conference      Physician: Dr Perry Oneil MD  : PALLAVI Vazquez  Registered Nurse: Ange Resendiz, RN, BSN, CRRN  Physical Therapist: Napoleon Prabhakar DPT  Occupational Therapist: Anitha Gordon MS, OTR/L  Speech Therapist:

## 2022-12-28 NOTE — PROGRESS NOTES
[facility] Occupational Therapy Daily Note  Patient Active Problem List   Diagnosis    Type 2 diabetes mellitus without complication, with long-term current use of insulin ([facility] [address] )    Benign essential hypertension    Mixed hyperlipidemia    Erectile dysfunction    Low testosterone    Obesity (BMI 30-39  9)    Testicular hypogonadism    Incarcerated umbilical hernia    Left ureteral calculus    Type 2 diabetes mellitus with hyperlipidemia (HCC)    Colonic mass    Microcytic anemia    Hypokalemia    Transaminitis    Thrombocytosis    Iron deficiency anemia, unspecified    Malignant neoplasm of transverse colon (HCC)    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)    Colon cancer metastasized to liver [facility])    Colostomy prolapse (HCC)    Other fatigue    Cervical radiculopathy    Encephalopathy    Flash pulmonary edema (HCC)    MR (mitral regurgitation)    Bacteremia    ESBL (extended spectrum beta-lactamase) producing bacteria infection    Acute respiratory failure with hypoxia (HCC)    Severe protein-calorie malnutrition ([facility] [address] )    Acute on chronic kidney failure (HCC)    Sepsis ([facility] [address] )    Abscess    Acute pain       Past Medical History:   Diagnosis Date    Abdominal pain 03/12/2022    Acute renal failure ([facility] [address] )     [id number] resolved    Cancer ([facility] [address] )     Diabetes mellitus ([facility] [address] )     Enteritis 08/23/2016    Gastroparesis due to DM ([facility] [address] ) 08/23/2016    GERD (gastroesophageal reflux disease)     Hernia, ventral 08/04/2016    Hyperlipidemia     Hypertension     Morbid obesity ([facility] [address] ) 04/17/2018    Postoperative visit 03/02/2022    SIRS (systemic inflammatory response syndrome) ([facility] [address] ) 03/12/2022    Snoring     Stage 3a chronic kidney disease ([facility] [address] ) 02/19/2022     Etiologic Diagnosis: Critical illness myopathy  Restrictions/Precautions  Precautions: Fall Risk, Contact/isolation, Multiple lines, Supervision on toilet/commode (VICTORINO drain, ileostomy, IR drain)  Weight Bearing Restrictions: No  ROM Restrictions: No  ADL Team Goal: Patient will require assist with ADLs with least restrictive device upon completion of rehab program  Occupational Therapy LTG's  Eating Oral care Bathing LB dress UB dress   Eating Goal: 06  Independent - Patient completes the activity by him/herself with no assistance from a helper  Oral Hygiene Goal: 06  Independent - Patient completes the activity by him/herself with no assistance from a helper  Shower/bathe self Goal: 04  Supervision or touching assistance- [place] provides VERBAL CUES or supervision throughout activity  Lower body dressing Goal: 04  Supervision or touching assistance- [place] provides VERBAL CUES or supervision throughout activity  Upper body dressing Goal: 04  Supervision or touching assistance- [place] provides VERBAL CUES or supervision throughout activity  Toileting Toilet txf Func txf IADL Med    Toileting hygiene Goal: 04  Supervision or touching assistance- [place] provides VERBAL CUES or supervision throughout activity  Toilet transfer Goal: 04  Supervision or touching assistance- [place] provides VERBAL CUES or supervision throughout activity  Assist Level: Supervision   OT interventions: Treatment/Interventions: ADL retraining, Functional transfer training, Therapeutic exercise, Endurance training, Patient/family training, Bed mobility, Equipment eval/education  Discharge Plan:  OT Discharge Recommendation:  (home with family support)   DME: Equipment Recommended:  (TBD),  ,       12/28/22 0900   Pain Assessment   Pain Assessment Tool 0-10   Pain Score 10 - Worst Possible Pain   Lifestyle   Autonomy "I cant believe I just did that "   Upper Body Dressing   Type of Assistance Needed Set-up / clean-up   Comment seated EOB   Upper Body Dressing CARE Score 5   Lower Body Dressing   Type of Assistance Needed Physical assistance   Physical Assistance Level 26%-50%   Comment assist for threading after 3x attempts at EOB  almost able to place feet into pants   improved abilty to perform clothing management in stance with unilateral UE relase  IMproved awareness of lines during task  Lower Body Dressing CARE Score 3   Sit to Lying   Type of Assistance Needed Incidental touching   Comment HOB elevated, no use of bedrails  Pt able to lift feet into bed  Pt extremely emotional with achomplishment of placing feet into bed  Sit to Lying CARE Score 4   Lying to Sitting on Side of Bed   Type of Assistance Needed Supervision   Comment HOB elevated   Lying to Sitting on Side of Bed CARE Score 4   Sit to Stand   Type of Assistance Needed Physical assistance   Physical Assistance Level 26%-50%   Comment MIN VC's at times for hand placement and technique  assist at times for controlled decent to seat despite UE support on chair arms  Sit to Stand CARE Score 3   Bed-Chair Transfer   Type of Assistance Needed Physical assistance   Physical Assistance Level 25% or less   Comment CGA w/ RW  assist for IV line management today  Chair/Bed-to-Chair Transfer CARE Score 3   Toileting Hygiene   Comment pt engages in ostomy bag emptying at EOB  assist for holding canister  Functional Standing Tolerance   Activity pt engages in table top LE stretching routine along with [employer] sequence for mini squats  Pt req UE support on table top  Pt reporting that he feels so tight and wishes he could do more floor activites to stretch but cant because of the drains  Cognition   Comments today pt feeling empowered in session  feeling like he is having a good day following a good night of sleep  Pt is happy with his progress however still reports "I am truly seeing now how far I have to go " pt eluding to realizing he may not be able to return to his work again, and that is difficult to cope with  Assessment   Treatment Assessment Pt participated in skilled OT treatment session with treatment focus on ADL re-training, fxnl xfers, standing tolerance and standing balance   Pt tolerated session well, with improvements in standing balance and confidence, able to complete some activities in standing today  Pt continues to require skilled acute rehab OT services to increase overall functional independence and safety w/ I/ADLs and functional transfers  Continue with plan for discharge as return home with increased family support,  Continued plan of care for OT sessions to focus on the following areas:  ADL Retraining , LB Dressing, Medication management , Functional Transfers, Functional Cognition, Functional Attention, Standing tolerance, Standing balance , Fine motor strengthening , Gross motor strengthening , DME training/education, Family training/education, Energy conservation training/education, healthy coping education, Leisure and social pursuits, sitting balance and Core/trunk control/strengthening      Prognosis Fair   Plan   Progress Progressing toward goals   OT Therapy Minutes   OT Time In 0900   OT Time Out 1030   OT Total Time (minutes) 90   OT Mode of treatment - Individual (minutes) 90   OT Mode of treatment - Concurrent (minutes) 0   OT Mode of treatment - Group (minutes) 0   OT Mode of treatment - Co-treat (minutes) 0   OT Mode of Treatment - Total time(minutes) 90 minutes   OT Cumulative Minutes 625

## 2022-12-28 NOTE — PLAN OF CARE
Problem: Prexisting or High Potential for Compromised Skin Integrity  Goal: Skin integrity is maintained or improved  Description: INTERVENTIONS:  - Identify patients at risk for skin breakdown  - Assess and monitor skin integrity  - Assess and monitor nutrition and hydration status  - Monitor labs   - Assess for incontinence   - Turn and reposition patient  - Assist with mobility/ambulation  - Relieve pressure over bony prominences  - Avoid friction and shearing  - Provide appropriate hygiene as needed including keeping skin clean and dry  - Evaluate need for skin moisturizer/barrier cream  - Collaborate with interdisciplinary team   - Patient/family teaching  - Consider wound care consult   Outcome: Progressing     Problem: Potential for Falls  Goal: Patient will remain free of falls  Description: INTERVENTIONS:  - Educate patient/family on patient safety including physical limitations  - Instruct patient to call for assistance with activity   - Consult OT/PT to assist with strengthening/mobility   - Keep Call bell within reach  - Keep bed low and locked with side rails adjusted as appropriate  - Keep care items and personal belongings within reach  - Initiate and maintain comfort rounds  - Make Fall Risk Sign visible to staff  - Offer Toileting every 2 Hours, in advance of need  - Initiate/Maintain bed/chair alarm  - Obtain necessary fall risk management equipment: nonskid socks  - Apply yellow socks and bracelet for high fall risk patients  - Consider moving patient to room near nurses station  Outcome: Progressing     Problem: PAIN - ADULT  Goal: Verbalizes/displays adequate comfort level or baseline comfort level  Description: Interventions:  - Encourage patient to monitor pain and request assistance  - Assess pain using appropriate pain scale  - Administer analgesics based on type and severity of pain and evaluate response  - Implement non-pharmacological measures as appropriate and evaluate response  - Consider cultural and social influences on pain and pain management  - Notify physician/advanced practitioner if interventions unsuccessful or patient reports new pain  Outcome: Progressing     Problem: INFECTION - ADULT  Goal: Absence or prevention of progression during hospitalization  Description: INTERVENTIONS:  - Assess and monitor for signs and symptoms of infection  - Monitor lab/diagnostic results  - Monitor all insertion sites, i e  indwelling lines, tubes, and drains  - Monitor endotracheal if appropriate and nasal secretions for changes in amount and color  - [place] appropriate cooling/warming therapies per order  - Administer medications as ordered  - Instruct and encourage patient and family to use good hand hygiene technique  - Identify and instruct in appropriate isolation precautions for identified infection/condition  Outcome: Progressing     Problem: SAFETY ADULT  Goal: Patient will remain free of falls  Description: INTERVENTIONS:  - Educate patient/family on patient safety including physical limitations  - Instruct patient to call for assistance with activity   - Consult OT/PT to assist with strengthening/mobility   - Keep Call bell within reach  - Keep bed low and locked with side rails adjusted as appropriate  - Keep care items and personal belongings within reach  - Initiate and maintain comfort rounds  - Make Fall Risk Sign visible to staff  - Offer Toileting every 2 Hours, in advance of need  - Initiate/Maintain bed  chair alarm  - Obtain necessary fall risk management equipment: nonskid socks  - Apply yellow socks and bracelet for high fall risk patients  - Consider moving patient to room near nurses station  Outcome: Progressing  Goal: Maintain or return to baseline ADL function  Description: INTERVENTIONS:  -  Assess patient's ability to carry out ADLs; assess patient's baseline for ADL function and identify physical deficits which impact ability to perform ADLs (bathing, care of mouth/teeth, toileting, grooming, dressing, etc )  - Assess/evaluate cause of self-care deficits   - Assess range of motion  - Assess patient's mobility; develop plan if impaired  - Assess patient's need for assistive devices and provide as appropriate  - Encourage maximum independence but intervene and supervise when necessary  - Involve family in performance of ADLs  - Assess for home care needs following discharge   - Consider OT consult to assist with ADL evaluation and planning for discharge  - Provide patient education as appropriate  Outcome: Progressing  Goal: Maintains/Returns to pre admission functional level  Description: INTERVENTIONS:  - Perform BMAT or MOVE assessment daily    - Set and communicate daily mobility goal to care team and patient/family/caregiver  - Collaborate with rehabilitation services on mobility goals if consulted  - Perform Range of Motion 3 times a day  - Reposition patient every 2 hours    - Dangle patient 3 times a day  - Stand patient 3 times a day  - Ambulate patient 3 times a day  - Out of bed to chair 3 times a day   - Out of bed for meals 3 times a day  - Out of bed for toileting  - Record patient progress and toleration of activity level   Outcome: Progressing     Problem: DISCHARGE PLANNING  Goal: Discharge to home or other facility with appropriate resources  Description: INTERVENTIONS:  - Identify barriers to discharge w/patient and caregiver  - Arrange for needed discharge resources and transportation as appropriate  - Identify discharge learning needs (meds, wound care, etc )  - Arrange for interpretive services to assist at discharge as needed  - Refer to Case Management Department for coordinating discharge planning if the patient needs post-hospital services based on physician/advanced practitioner order or complex needs related to functional status, cognitive ability, or social support system  Outcome: Progressing     Problem: Nutrition/Hydration-ADULT  Goal: Nutrient/Hydration intake appropriate for improving, restoring or maintaining nutritional needs  Description: Monitor and assess patient's nutrition/hydration status for malnutrition  Collaborate with interdisciplinary team and initiate plan and interventions as ordered  Monitor patient's weight and dietary intake as ordered or per policy  Utilize nutrition screening tool and intervene as necessary  Determine patient's food preferences and provide high-protein, high-caloric foods as appropriate       INTERVENTIONS:  - Monitor oral intake, urinary output, labs, and treatment plans  - Assess nutrition and hydration status and recommend course of action  - Evaluate amount of meals eaten  - Assist patient with eating if necessary   - Allow adequate time for meals  - Recommend/ encourage appropriate diets, oral nutritional supplements, and vitamin/mineral supplements  - Order, calculate, and assess calorie counts as needed  - Recommend, monitor, and adjust tube feedings and TPN/PPN based on assessed needs  - Assess need for intravenous fluids  - Provide specific nutrition/hydration education as appropriate  - Include patient/family/caregiver in decisions related to nutrition  Outcome: Progressing     Problem: MOBILITY - ADULT  Goal: Maintain or return to baseline ADL function  Description: INTERVENTIONS:  -  Assess patient's ability to carry out ADLs; assess patient's baseline for ADL function and identify physical deficits which impact ability to perform ADLs (bathing, care of mouth/teeth, toileting, grooming, dressing, etc )  - Assess/evaluate cause of self-care deficits   - Assess range of motion  - Assess patient's mobility; develop plan if impaired  - Assess patient's need for assistive devices and provide as appropriate  - Encourage maximum independence but intervene and supervise when necessary  - Involve family in performance of ADLs  - Assess for home care needs following discharge   - Consider OT consult to assist with ADL evaluation and planning for discharge  - Provide patient education as appropriate  Outcome: Progressing  Goal: Maintains/Returns to pre admission functional level  Description: INTERVENTIONS:  - Perform BMAT or MOVE assessment daily    - Set and communicate daily mobility goal to care team and patient/family/caregiver  - Collaborate with rehabilitation services on mobility goals if consulted  - Perform Range of Motion 3 times a day  - Reposition patient every 2 hours    - Dangle patient 3 times a day  - Stand patient 3 times a day  - Ambulate patient 3 times a day  - Out of bed to chair 3 times a day   - Out of bed for meals 3 times a day  - Out of bed for toileting  - Record patient progress and toleration of activity level   Outcome: Progressing

## 2022-12-28 NOTE — PLAN OF CARE
Problem: Prexisting or High Potential for Compromised Skin Integrity  Goal: Skin integrity is maintained or improved  Description: INTERVENTIONS:  - Identify patients at risk for skin breakdown  - Assess and monitor skin integrity  - Assess and monitor nutrition and hydration status  - Monitor labs   - Assess for incontinence   - Turn and reposition patient  - Assist with mobility/ambulation  - Relieve pressure over bony prominences  - Avoid friction and shearing  - Provide appropriate hygiene as needed including keeping skin clean and dry  - Evaluate need for skin moisturizer/barrier cream  - Collaborate with interdisciplinary team   - Patient/family teaching  - Consider wound care consult   Outcome: Progressing     Problem: Potential for Falls  Goal: Patient will remain free of falls  Description: INTERVENTIONS:  - Educate patient/family on patient safety including physical limitations  - Instruct patient to call for assistance with activity   - Consult OT/PT to assist with strengthening/mobility   - Keep Call bell within reach  - Keep bed low and locked with side rails adjusted as appropriate  - Keep care items and personal belongings within reach  - Initiate and maintain comfort rounds  - Make Fall Risk Sign visible to staff  - Offer Toileting every 2 Hours, in advance of need  - Initiate/Maintain bed alarm  - Obtain necessary fall risk management equipment: bed alarm  - Apply yellow socks and bracelet for high fall risk patients  - Consider moving patient to room near nurses station  Outcome: Progressing     Problem: PAIN - ADULT  Goal: Verbalizes/displays adequate comfort level or baseline comfort level  Description: Interventions:  - Encourage patient to monitor pain and request assistance  - Assess pain using appropriate pain scale  - Administer analgesics based on type and severity of pain and evaluate response  - Implement non-pharmacological measures as appropriate and evaluate response  - Consider cultural and social influences on pain and pain management  - Notify physician/advanced practitioner if interventions unsuccessful or patient reports new pain  Outcome: Progressing     Problem: INFECTION - ADULT  Goal: Absence or prevention of progression during hospitalization  Description: INTERVENTIONS:  - Assess and monitor for signs and symptoms of infection  - Monitor lab/diagnostic results  - Monitor all insertion sites, i e  indwelling lines, tubes, and drains  - Monitor endotracheal if appropriate and nasal secretions for changes in amount and color  - [place] appropriate cooling/warming therapies per order  - Administer medications as ordered  - Instruct and encourage patient and family to use good hand hygiene technique  - Identify and instruct in appropriate isolation precautions for identified infection/condition  Outcome: Progressing     Problem: SAFETY ADULT  Goal: Patient will remain free of falls  Description: INTERVENTIONS:  - Educate patient/family on patient safety including physical limitations  - Instruct patient to call for assistance with activity   - Consult OT/PT to assist with strengthening/mobility   - Keep Call bell within reach  - Keep bed low and locked with side rails adjusted as appropriate  - Keep care items and personal belongings within reach  - Initiate and maintain comfort rounds  - Make Fall Risk Sign visible to staff  - Offer Toileting every 2 Hours, in advance of need  - Initiate/Maintain bed alarm  - Obtain necessary fall risk management equipment: bed alarm  - Apply yellow socks and bracelet for high fall risk patients  - Consider moving patient to room near nurses station  Outcome: Progressing  Goal: Maintain or return to baseline ADL function  Description: INTERVENTIONS:  -  Assess patient's ability to carry out ADLs; assess patient's baseline for ADL function and identify physical deficits which impact ability to perform ADLs (bathing, care of mouth/teeth, toileting, grooming, dressing, etc )  - Assess/evaluate cause of self-care deficits   - Assess range of motion  - Assess patient's mobility; develop plan if impaired  - Assess patient's need for assistive devices and provide as appropriate  - Encourage maximum independence but intervene and supervise when necessary  - Involve family in performance of ADLs  - Assess for home care needs following discharge   - Consider OT consult to assist with ADL evaluation and planning for discharge  - Provide patient education as appropriate  Outcome: Progressing  Goal: Maintains/Returns to pre admission functional level  Description: INTERVENTIONS:  - Perform BMAT or MOVE assessment daily    - Set and communicate daily mobility goal to care team and patient/family/caregiver  - Collaborate with rehabilitation services on mobility goals if consulted  - Perform Range of Motion 3 times a day  - Reposition patient every 2 hours    - Dangle patient 3 times a day  - Stand patient 3 times a day  - Ambulate patient 3 times a day  - Out of bed to chair 3 times a day   - Out of bed for meals 3 times a day  - Out of bed for toileting  - Record patient progress and toleration of activity level   Outcome: Progressing     Problem: DISCHARGE PLANNING  Goal: Discharge to home or other facility with appropriate resources  Description: INTERVENTIONS:  - Identify barriers to discharge w/patient and caregiver  - Arrange for needed discharge resources and transportation as appropriate  - Identify discharge learning needs (meds, wound care, etc )  - Arrange for interpretive services to assist at discharge as needed  - Refer to Case Management Department for coordinating discharge planning if the patient needs post-hospital services based on physician/advanced practitioner order or complex needs related to functional status, cognitive ability, or social support system  Outcome: Progressing     Problem: Nutrition/Hydration-ADULT  Goal: Nutrient/Hydration intake appropriate for improving, restoring or maintaining nutritional needs  Description: Monitor and assess patient's nutrition/hydration status for malnutrition  Collaborate with interdisciplinary team and initiate plan and interventions as ordered  Monitor patient's weight and dietary intake as ordered or per policy  Utilize nutrition screening tool and intervene as necessary  Determine patient's food preferences and provide high-protein, high-caloric foods as appropriate       INTERVENTIONS:  - Monitor oral intake, urinary output, labs, and treatment plans  - Assess nutrition and hydration status and recommend course of action  - Evaluate amount of meals eaten  - Assist patient with eating if necessary   - Allow adequate time for meals  - Recommend/ encourage appropriate diets, oral nutritional supplements, and vitamin/mineral supplements  - Order, calculate, and assess calorie counts as needed  - Recommend, monitor, and adjust tube feedings and TPN/PPN based on assessed needs  - Assess need for intravenous fluids  - Provide specific nutrition/hydration education as appropriate  - Include patient/family/caregiver in decisions related to nutrition  Outcome: Progressing     Problem: MOBILITY - ADULT  Goal: Maintain or return to baseline ADL function  Description: INTERVENTIONS:  -  Assess patient's ability to carry out ADLs; assess patient's baseline for ADL function and identify physical deficits which impact ability to perform ADLs (bathing, care of mouth/teeth, toileting, grooming, dressing, etc )  - Assess/evaluate cause of self-care deficits   - Assess range of motion  - Assess patient's mobility; develop plan if impaired  - Assess patient's need for assistive devices and provide as appropriate  - Encourage maximum independence but intervene and supervise when necessary  - Involve family in performance of ADLs  - Assess for home care needs following discharge   - Consider OT consult to assist with ADL evaluation and planning for discharge  - Provide patient education as appropriate  Outcome: Progressing  Goal: Maintains/Returns to pre admission functional level  Description: INTERVENTIONS:  - Perform BMAT or MOVE assessment daily    - Set and communicate daily mobility goal to care team and patient/family/caregiver  - Collaborate with rehabilitation services on mobility goals if consulted  - Perform Range of Motion 3 times a day  - Reposition patient every 2 hours    - Dangle patient 3 times a day  - Stand patient 3 times a day  - Ambulate patient 3 times a day  - Out of bed to chair 3 times a day   - Out of bed for meals 3 times a day  - Out of bed for toileting  - Record patient progress and toleration of activity level   Outcome: Progressing

## 2022-12-28 NOTE — PROGRESS NOTES
NEUROPSYCHOLOGY INITIAL CONSULT  NOTE  Cari Gates 61 y o  :1965 male MRN: [id number]  DOS:22  Unit/Bed#: -01 Encounter: [id number]      Requested by (Physician/Service): Merna Wilburn MD  Reason for Consultation: Evaluate and treat impact of mood and coping on progress in rehabilitation  HPI: Cari Gates is a 61 y o  male with metastatic colonic adenocarcinoma, recently discharged from prolonged surgical admission culminating in partial colectomy with diverting ileostomy c/b intra-abdominal abscess  Pt returned 22 from [facility]) due to concerns for ability to manage patient  male with a medical history of hypertension, hyperlipidemia, ventral hernia, GERD, presented to [address] [place] [address] on 22 for an elective surgical procedure due to metastatic colon adenocarcinoma by Dr Gurjit Cannon  CT of abdomen revealed transverse colonic apple core lesion and innumerable hepatic metastases  MRI revealed multiple hepatic metastasis with smaller lesions in left lobe with larger lesions on the right lobe  On , patient underwent exploratory laparotomy, segment 3 liver resection, ablation, intraoperative ultrasound of liver  This was complicated by a left upper quadrant hematoma, imaging showed suspected leak from transverse colon anastomosis  On  pt went to OR revealed left upper quadrant infected hematoma, defect in the transverse colon anastomosis with extravasation of succus  Massively dilated cecum requiring resection  He had a right hemicolectomy, left in discontinuity and temporary abdominal closure device was placed  On , he underwent re-exploration, partial descending colectomy, primary colonic anastomosis created with diverting loop ileostomy and midline wound VAC placement  He completed a prolonged course of IV antibiotics for ESBL bacteremia due to intra-abdominal abscess  On 12/3, patient was discharged to [facility] for rehab  But was transferred back to hospital on 12/4 due to abdominal pain  He is unable to return to SNF due to concerns for pain management and management of acute medical needs  Per Surgery, patient is to continue with wet-to-dry dressings to midline abdominal incision, and packing to old stoma site  Patient has been observed off of antibiotics, with close monitoring of leukocytosis  On 12/6, patient was with noted increased creatinine level, and was administered 1L IV fluid bolus, with noted improvement  He then developed tachycardia, as well as increased abdominal pain, requiring additional IV fluid bolus  Abdominal incision was with noted yellow drainage  CT chest/abdomen/pelvis showed abdominal abscess and distended gallbladder  ID was consulted, and patient was continued on IV antibiotics (plan for 7 day course through 12/15)  Patient was taken to IR and underwent percutaneous cholecystostomy tube insertion and hepatectomy abscess drainage  Nephrology was consulted re: ALEXY, and initiated patient on Sodium Bicarb gtt as well as IV Albumin to assist with intravascular volume shifts, which as since been discontinued       On 12/14, patient with complaints of midline/left chestwall pain, right below IR abscess drain  Lasted for a few seconds and then resolved - suspected related to drain placement, however cardiac workup completed  Trop level was 57 and CXR showed progressive bilateral opacities suspicious for CHF and small left pleural effusion  Cardiology was consulted, with chest pain appearing musculoskeletal, and no further cardiac workup is warranted  Additionally, no diuresis or further intervention suggested for CXR findings      The patient was evaluated by the Rehabilitation team and deemed an appropriate candidate for comprehensive inpatient rehabilitation and admitted to the [facility] on 12/14/2022  3:18 PM with functional deficits: impaired mobility, self care    Rehabilitation goals are to achieve a modified independent level with mobility and self care  Prognosis is good  ELOS is 10-14 days  Estimated discharge is home       HISTORY:     Patient Active Problem List    Diagnosis Date Noted   • Abscess 12/27/2022   • Acute pain 12/27/2022   • Sepsis [facility] ) 12/17/2022   • Severe protein-calorie malnutrition ([facility] ) 12/14/2022   • Acute on chronic kidney failure ([facility] ) 12/14/2022   • Flash pulmonary edema ([facility] ) 11/12/2022   • MR (mitral regurgitation) 11/12/2022   • Bacteremia 11/12/2022   • ESBL (extended spectrum beta-lactamase) producing bacteria infection 11/12/2022   • Acute respiratory failure with hypoxia ([facility] ) 11/12/2022   • Encephalopathy 11/09/2022   • Cervical radiculopathy 10/26/2022   • Other fatigue 06/15/2022   • Colostomy prolapse ([facility] ) 05/27/2022   • Colon cancer metastasized to liver ([facility] ) 03/12/2022   • 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 liver ([facility] ) 03/02/2022   • Iron deficiency anemia, unspecified 03/01/2022   • Malignant neoplasm of transverse colon ([facility] ) 03/01/2022   • Thrombocytosis 02/21/2022   • Hypokalemia 02/19/2022   • Transaminitis 02/19/2022   • Type 2 diabetes mellitus with hyperlipidemia ([facility] ) 02/18/2022   • Colonic mass 02/18/2022   • Microcytic anemia 02/18/2022   • Left ureteral calculus 01/30/2020   • Incarcerated umbilical hernia 21/69/8308   • Testicular hypogonadism 06/19/2017   • Low testosterone 05/30/2017   • Type 2 diabetes mellitus without complication, with long-term current use of insulin ([facility] ) 08/23/2016   • Benign essential hypertension 08/23/2016   • Mixed hyperlipidemia 08/23/2016   • Erectile dysfunction 07/11/2016   • Obesity (BMI 30-39 9) 07/11/2016       Body mass index is 33 kg/m²      Past Medical History:     Past Medical History:   Diagnosis Date   • Abdominal pain 03/12/2022   • Acute renal failure ([place] [address] )     [id number] resolved   • Cancer ([place] [address] )    • Diabetes mellitus ([place] [address] )    • Enteritis 08/23/2016   • Gastroparesis due to DM ([place] [address] ) 08/23/2016   • GERD (gastroesophageal reflux disease)    • Hernia, ventral 08/04/2016   • Hyperlipidemia    • Hypertension    • Morbid obesity ([facility] ) 04/17/2018   • Postoperative visit 03/02/2022   • SIRS (systemic inflammatory response syndrome) ([facility] ) 03/12/2022   • Snoring    • Stage 3a chronic kidney disease ([facility] ) 02/19/2022        Past Surgical History:     Past Surgical History:   Procedure Laterality Date   • COLONOSCOPY     • COLOSTOMY N/A 02/20/2022    Procedure: COLOSTOMY LOOP, diverting;  Surgeon: Silvina Jenkins MD;  Location: [facility];  Service: General   • ESOPHAGOGASTRODUODENOSCOPY N/A 08/24/2016    Procedure: ESOPHAGOGASTRODUODENOSCOPY (EGD); Surgeon: Alexander Contreras MD;  Location: [facility];   Service:    • EXPLORATORY LAPAROTOMY W/ BOWEL RESECTION N/A 11/16/2022    Procedure: LAPAROTOMY EXPLORATORY W/ BOWEL RESECTION- VAC PLACEMENT;  Surgeon: Vimal Riojas MD;  Location: [facility];  Service: Surgical Oncology   • EXPLORATORY LAPAROTOMY W/ BOWEL RESECTION N/A 11/17/2022    Procedure: LAPAROTOMY EXPLORATORY W/ BOWEL RESECTION;  Surgeon: Vimal Riojas MD;  [facility]: [facility];  Service: Surgical Oncology   • ILEOSTOMY N/A 11/17/2022    Procedure: ILEOSTOMY;  Surgeon: Vimal Riojas MD;  [facility]: [facility];  Service: Surgical Oncology   • ILEOSTOMY CLOSURE N/A 11/7/2022    Procedure: REVERSAL COLOSTOMY;  Surgeon: Vimal Oneil MD;  [facility];  Service: Surgical Oncology   • IR CHOLECYSTOSTOMY TUBE CHECK/CHANGE/REPOSITION/REINSERTION/UPSIZE  12/12/2022   • IR CHOLECYSTOSTOMY TUBE PLACEMENT  12/8/2022   • IR DRAINAGE TUBE PLACEMENT  12/8/2022   • IR DRAINAGE TUBE PLACEMENT  12/20/2022   • IR PORT PLACEMENT  03/10/2022   • KIDNEY STONE SURGERY     • LIVER BIOPSY LAPAROSCOPIC N/A 02/20/2022    Procedure: DIAGNOSTIC LAPAROSCOPIC LIVER BIOPSY, DIVERTING LOOP COLOSTOMY ;  Surgeon: Silvina Jenkins MD;  Location: [facility];  Service: General   • LIVER LOBECTOMY N/A 11/7/2022    Procedure: SEGMENT 3 LIVER RESECTION, ABLATION, INTRAOPERATIVE U/S OF LIVER, PERITONEAL BIOPSY;  Surgeon: Madeline Walker MD;  [facility]: [facility];  Service: Surgical Oncology   • RIGHT COLON RESECTION N/A 11/7/2022    Procedure: EX LAP, TRANVERSE COLON RESECTION;  Surgeon: Madeline Walker MD;  [facility];  Service: Surgical Oncology   • TONSILLECTOMY     • UMBILICAL HERNIA REPAIR LAPAROSCOPIC N/A 04/26/2019    Procedure: LAPAROSCOPIC UMBILICAL HERNIA REPAIR;  Surgeon: Alejandro Lee MD;  [facility]: [facility];  Service: General   • VAC DRESSING APPLICATION N/A 24/71/8579    Procedure: APPLICATION VAC DRESSING ABDOMEN/TRUNK;  Surgeon: Abilio Rizo MD;  [facility];  Service: Surgical Oncology         Allergies: Allergies   Allergen Reactions   • Shellfish-Derived Products - Food Allergy Anaphylaxis   • Erythromycin GI Intolerance         Social History:    Social History     Socioeconomic History   • Marital status: /Civil Union     Spouse name: None   • Number of children: None   • Years of education: None   • Highest education level: None   Occupational History   • Occupation: ACTOR     Employer: [employer]   Tobacco Use   • Smoking status: Never   • Smokeless tobacco: Never   Vaping Use   • Vaping Use: Never used   Substance and Sexual Activity   • Alcohol use: Never   • Drug use: No   • Sexual activity: Yes     Partners: Female   Other Topics Concern   • None   Social History Narrative   • None     Social Determinants of Health     Financial Resource Strain: Not on file   Food Insecurity: No Food Insecurity   • Worried About Running Out of Food in the Last Year: Never true   • Ran Out of Food in the Last Year: Never true   Transportation Needs: No Transportation Needs   • Lack of Transportation (Medical): No   • Lack of Transportation (Non-Medical):  No   Physical Activity: Insufficiently Active   • Days of Exercise per Week: 5 days   • Minutes of Exercise per Session: 10 min   Stress: No Stress Concern Present   • Feeling of Stress : Not at all   Social Connections: Not on file   Intimate Partner Violence: Not on file   Housing Stability: Low Risk    • Unable to Pay for Housing in the Last Year: No   • Number of Places Lived in the Last Year: 1   • Unstable Housing in the Last Year: No        Family History:    Family History   Problem Relation Age of Onset   • Diabetes Mother         mellitus   • Lung cancer Mother    • Coronary artery disease Father        Medications:     Current Facility-Administered Medications:   •  acetaminophen (TYLENOL) tablet 975 mg, 975 mg, Oral, Q8H [address], GAURANG Keith, 975 mg at 12/28/22 2058  •  amLODIPine (NORVASC) tablet 5 mg, 5 mg, Oral, BID, GAURANG Keith, 5 mg at 12/28/22 1723  •  aspirin chewable tablet 81 mg, 81 mg, Oral, Daily, GAURANG Keith, 81 mg at 12/28/22 2166  •  atorvastatin (LIPITOR) tablet 40 mg, 40 mg, Oral, Daily, GAURANG Keith, 40 mg at 12/28/22 0753  •  calcium carbonate (TUMS) chewable tablet 500 mg, 500 mg, Oral, BID PRN, GAURANG Keith, 500 mg at 12/28/22 1550  •  cholecalciferol (VITAMIN D3) tablet 400 Units, 400 Units, Oral, Daily, GAURANG Keith, 400 Units at 12/28/22 0758  •  ertapenem (INVanz) 1,000 mg in sodium chloride 0 9 % 50 mL IVPB, 1,000 mg, Intravenous, Q24H, Faiza Villasenor MD, Stopped at 12/27/22 2120  •  heparin (porcine) subcutaneous injection 5,000 Units, 5,000 Units, Subcutaneous, Q8H [address], GAURANG Keith, 5,000 Units at 12/28/22 1319  •  insulin glargine (LANTUS) subcutaneous injection 12 Units 0 12 mL, 12 Units, Subcutaneous, HS, GAURANG Todd, 12 Units at 12/27/22 2115  •  insulin lispro (HumaLOG) 100 units/mL subcutaneous injection 1-5 Units, 1-5 Units, Subcutaneous, HS, GAURANG Keith, 1 Units at 12/22/22 2130  •  insulin lispro (HumaLOG) 100 units/mL subcutaneous injection 1-6 Units, 1-6 Units, Subcutaneous, TID AC, 1 Units at 12/28/22 1723 **AND** Fingerstick Glucose (POCT), , , TID Jenelle HAYES CRNP  •  insulin lispro (HumaLOG) 100 units/mL subcutaneous injection 6 Units, 6 Units, Subcutaneous, TID With Meals, GAURANG Pride, 6 Units at 12/28/22 1152  •  iohexol (OMNIPAQUE) 240 MG/ML solution 50 mL, 50 mL, Oral, 90 min pre-procedure, Wai Vegas MD  •  melatonin tablet 3 mg, 3 mg, Oral, HS, GAURANG Keith, 3 mg at 12/27/22 2116  •  methocarbamol (ROBAXIN) tablet 500 mg, 500 mg, Oral, BID, GAURANG Keith, 500 mg at 12/28/22 1723  •  multivitamin-minerals (CENTRUM) tablet 1 tablet, 1 tablet, Oral, Daily, GAURANG Keith, 1 tablet at 12/28/22 7373  •  ondansetron (ZOFRAN-ODT) dispersible tablet 4 mg, 4 mg, Oral, Q6H PRN, GAURANG Keith, 4 mg at 12/28/22 1333  •  oxyCODONE (ROXICODONE) immediate release tablet 10 mg, 10 mg, Oral, Q6H PRN, GAURANG Keith, 10 mg at 12/27/22 2115  •  oxyCODONE (ROXICODONE) IR tablet 2 5 mg, 2 5 mg, Oral, Q3H PRN, GAURANG Keith, 2 5 mg at 12/25/22 1911  •  oxyCODONE (ROXICODONE) IR tablet 5 mg, 5 mg, Oral, Q6H PRN, GAURANG Keith, 5 mg at 12/26/22 2106  •  pantoprazole (PROTONIX) EC tablet 40 mg, 40 mg, Oral, BID Jenelle HAYES CRNP, 40 mg at 12/28/22 1550  •  simethicone (MYLICON) chewable tablet 80 mg, 80 mg, Oral, 4x Daily (with meals and at bedtime), GAURANG Keith, 80 mg at 12/28/22 1723  •  tamsulosin (FLOMAX) capsule 0 4 mg, 0 4 mg, Oral, Daily With Dinner, Jyoti Wesley, CRNP, 0 4 mg at 12/28/22 1550      _________________________________________________________________________________    ASSESSMENT:   Sarah Posadas is an engaging and pleasant 61year old  male 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  He has maintained stable employment with [employer] working on [employer] the [employer]" in "[facility] "  The trade offs for this stable employment are that he commutes to [employer] from the [email or web address] 6 days per week    Pt loves his job and career and very much wants to recover so that he can return to this work  He does recognize the physical and energy demands of singing and dancing on a [place] show are a sharp contrast to his current functional status and he is trying to remain present focused to avoid feeling overwhelmed and distressed  He describes a supportive and close family and states he draws his strength and hope from his family and children  He feels very upset about the extent of complications from his surgery, as well as the potential progression of the cancer  He struggles with bouts of extreme anxiety and fears about his future and responded well to interventions helping him to stay present focused with smaller present oriented goals  Pt  maintained good eye contact and engaged appropriately throughout the interview  He was seen in his room at bedside, presented as pleasant,  cooperative and established rapport without difficulty  Mood ranged from positive to tearful and was appropriate to context  No evidence of euphoria or emotional lability is present  He reports decreased appetite but pushes himself to eat so regain his strength  No evidence of poor boundaries was present  Pt  denies incidents of losing time or flash backs  No pressured speech, repetitive or perseverative behavior is present  No obsessive-compulsive or associated rituals  Pt's conversational speech was fluent and articulate with no evidence of word finding difficulty  Pt exhibits signs of both depression and anxiety as he confronts this major change in his health and functioning  He may benefit by medications to help him improve mood and anxiety  Supportive psychotherapy may also help pt process emotions, improve mood and implement new coping strategies through  CBT and DBT      DIAGNOSIS:  Adjustment Disorder with Anxiety and Depression      RECOMMENDATIONS:   I will follow Mr Emilee Rico during his stay to provide the following interventions:    · Supportive psychotherapy, utilizing CBT and mindfulness strategies  · DBT distress tolerance techniques  to improve coping and mood  · Meditation and relaxation training          Thank you for the opportunity to participate in Mr Sommer's care  Wash Kun Esquivel, Ph D   Licensed Psychologist

## 2022-12-28 NOTE — CASE MANAGEMENT
Clinical update faxed to claudia at [facility] via [employer], [phone], awaiting determination

## 2022-12-29 PROBLEM — D72.829 LEUKOCYTOSIS: Status: ACTIVE | Noted: 2022-12-29

## 2022-12-29 LAB
GLUCOSE SERPL-MCNC: 140 MG/DL (ref 65–140)
GLUCOSE SERPL-MCNC: 157 MG/DL (ref 65–140)
GLUCOSE SERPL-MCNC: 176 MG/DL (ref 65–140)
GLUCOSE SERPL-MCNC: 187 MG/DL (ref 65–140)
GLUCOSE SERPL-MCNC: 205 MG/DL (ref 65–140)

## 2022-12-29 RX ADMIN — TAMSULOSIN HYDROCHLORIDE 0.4 MG: 0.4 CAPSULE ORAL at 16:46

## 2022-12-29 RX ADMIN — PANTOPRAZOLE SODIUM 40 MG: 40 TABLET, DELAYED RELEASE ORAL at 06:37

## 2022-12-29 RX ADMIN — PANTOPRAZOLE SODIUM 40 MG: 40 TABLET, DELAYED RELEASE ORAL at 16:46

## 2022-12-29 RX ADMIN — ATORVASTATIN CALCIUM 40 MG: 40 TABLET, FILM COATED ORAL at 08:02

## 2022-12-29 RX ADMIN — INSULIN LISPRO 1 UNITS: 100 INJECTION, SOLUTION INTRAVENOUS; SUBCUTANEOUS at 11:32

## 2022-12-29 RX ADMIN — AMLODIPINE BESYLATE 5 MG: 5 TABLET ORAL at 18:54

## 2022-12-29 RX ADMIN — ACETAMINOPHEN 975 MG: 325 TABLET, FILM COATED ORAL at 13:51

## 2022-12-29 RX ADMIN — METHOCARBAMOL 500 MG: 500 TABLET ORAL at 08:02

## 2022-12-29 RX ADMIN — CHOLECALCIFEROL TAB 10 MCG (400 UNIT) 400 UNITS: 10 TAB at 08:03

## 2022-12-29 RX ADMIN — Medication 1 TABLET: at 08:02

## 2022-12-29 RX ADMIN — ALTEPLASE 2 MG: 2.2 INJECTION, POWDER, LYOPHILIZED, FOR SOLUTION INTRAVENOUS at 16:05

## 2022-12-29 RX ADMIN — INSULIN LISPRO 6 UNITS: 100 INJECTION, SOLUTION INTRAVENOUS; SUBCUTANEOUS at 11:33

## 2022-12-29 RX ADMIN — METHOCARBAMOL 500 MG: 500 TABLET ORAL at 18:53

## 2022-12-29 RX ADMIN — INSULIN LISPRO 6 UNITS: 100 INJECTION, SOLUTION INTRAVENOUS; SUBCUTANEOUS at 07:59

## 2022-12-29 RX ADMIN — ASPIRIN 81 MG CHEWABLE TABLET 81 MG: 81 TABLET CHEWABLE at 08:02

## 2022-12-29 RX ADMIN — HEPARIN SODIUM 5000 UNITS: 5000 INJECTION INTRAVENOUS; SUBCUTANEOUS at 21:44

## 2022-12-29 RX ADMIN — ALTEPLASE 2 MG: 2.2 INJECTION, POWDER, LYOPHILIZED, FOR SOLUTION INTRAVENOUS at 10:46

## 2022-12-29 RX ADMIN — SIMETHICONE 80 MG: 80 TABLET, CHEWABLE ORAL at 11:34

## 2022-12-29 RX ADMIN — SIMETHICONE 80 MG: 80 TABLET, CHEWABLE ORAL at 16:46

## 2022-12-29 RX ADMIN — SIMETHICONE 80 MG: 80 TABLET, CHEWABLE ORAL at 21:41

## 2022-12-29 RX ADMIN — AMLODIPINE BESYLATE 5 MG: 5 TABLET ORAL at 08:06

## 2022-12-29 RX ADMIN — INSULIN LISPRO 1 UNITS: 100 INJECTION, SOLUTION INTRAVENOUS; SUBCUTANEOUS at 21:42

## 2022-12-29 RX ADMIN — INSULIN GLARGINE 6 UNITS: 100 INJECTION, SOLUTION SUBCUTANEOUS at 21:41

## 2022-12-29 RX ADMIN — HEPARIN SODIUM 5000 UNITS: 5000 INJECTION INTRAVENOUS; SUBCUTANEOUS at 13:51

## 2022-12-29 RX ADMIN — ONDANSETRON 4 MG: 4 TABLET, ORALLY DISINTEGRATING ORAL at 21:43

## 2022-12-29 RX ADMIN — ACETAMINOPHEN 975 MG: 325 TABLET, FILM COATED ORAL at 21:40

## 2022-12-29 RX ADMIN — MELATONIN TAB 3 MG 3 MG: 3 TAB at 21:40

## 2022-12-29 RX ADMIN — OXYCODONE HYDROCHLORIDE 10 MG: 10 TABLET ORAL at 21:48

## 2022-12-29 RX ADMIN — ACETAMINOPHEN 975 MG: 325 TABLET, FILM COATED ORAL at 06:37

## 2022-12-29 RX ADMIN — SIMETHICONE 80 MG: 80 TABLET, CHEWABLE ORAL at 07:59

## 2022-12-29 RX ADMIN — HEPARIN SODIUM 5000 UNITS: 5000 INJECTION INTRAVENOUS; SUBCUTANEOUS at 06:37

## 2022-12-29 RX ADMIN — INSULIN LISPRO 2 UNITS: 100 INJECTION, SOLUTION INTRAVENOUS; SUBCUTANEOUS at 16:56

## 2022-12-29 NOTE — TELEMEDICINE
e-Consult (IPC)  - Interventional Radiology  Drenda Po 61 y o  male MRN: [id number]  Unit/Bed#: -60 Encounter: [id number]          Interventional Radiology has been consulted to evaluate Drenda Po    We were consulted by PMR concerning this patient with multiple abscess drains for drain placement evaluation and potential removal due to decreased output  IR Consult-(For Para, Thora, LP, PICC(for GFR>/=45) use the specific ordersets  Consult performed by: Quinton Winn PA-C  Consult ordered by: Charbel Gallardo MD        12/29/22    Assessment/Recommendation:   74-year-old male currently undergoing rehab secondary to complicated surgical history relating to metastatic colon cancer  Also status post multiple drain placements by interventional radiology for intra-abdominal collections and abscesses    1  Decreased abscess drain output  -Patient currently with 3 abscess drains and cholecystostomy tube  -Outputs of drains reviewed  -Left abdominal and left flank drain placed on 12/20 are reported to have less than 10 cc of output per day over 48 hours  -Abdominal abscess drain placed on 12/8 continues with moderate output  -Will plan for tube check and potential removal of appropriate drains  -Please reach out to IR with any questions or concerns    5-10 minutes, >50% of the total time devoted to medical consultative verbal/EMR discussion between providers  Written report will be generated in the EMR  Thank you for allowing Interventional Radiology to participate in the care of Drenda Po  Please don't hesitate to call or TigerText us with any questions       Quinton Winn PA-C

## 2022-12-29 NOTE — PROGRESS NOTES
12/29/22 1401   Pain Assessment   Pain Assessment Tool 0-10   Pain Score No Pain   Restrictions/Precautions   Precautions Fall Risk;Pain;Multiple lines;Supervision on toilet/commode  (VICTORINO drain, ileostomy, IR drain)   Weight Bearing Restrictions No   ROM Restrictions No   Sit to Stand   Type of Assistance Needed Incidental touching; Adaptive equipment;Verbal cues   Physical Assistance Level No physical assistance   Comment Pt requires VC's for hand placement as initially he attempts to place both hands on walker  Pt able to compelte at a CGA level with inc time and cues  Sit to Stand CARE Score 4   Bed-Chair Transfer   Type of Assistance Needed Physical assistance   Physical Assistance Level 26%-50%   Comment min/modA with RW   Chair/Bed-to-Chair Transfer CARE Score 3   Toileting Hygiene   Type of Assistance Needed Physical assistance   Physical Assistance Level 51%-75%   Comment Pt does not feel confident at this time to attempt in stance, agreeable to complete urinal use while in bed at an overall CS level  If in stance continues to require modA  Toileting Hygiene CARE Score 2   Exercise Tools   Other Exercise Tool 1 UB strengthening completed while seated with use of 3# dowel bar moving through all planes compelting 3 x 10 each to increase UB strength and endurance for increased independence with ADL tasks and functional transfers  Cognition   Overall Cognitive Status WFL   Arousal/Participation Cooperative   Attention Attends with cues to redirect   Orientation Level Oriented X4   Memory Within functional limits   Following Commands Follows one step commands with increased time or repetition   Activity Tolerance   Activity Tolerance Patient limited by fatigue;Patient limited by pain   Assessment   Treatment Assessment Pt participated in skilled OT services with focus on toileting, strengthening, and functional txfers   Pt reports feeling more fatigued at this time and "emotionally drained " Pt continues to require emotional support/encouragement  Pt remains limited by dec act julia, dec strength, dec standing balance/julia  Pt will continue to benefit from skilled OT services with focus on LB dressing/LHAE training, act julia, endurance, strengthening  Prognosis Fair   Problem List Decreased strength;Decreased endurance;Decreased mobility; Impaired balance;Orthopedic restrictions;Pain   Plan   Treatment/Interventions ADL retraining;Functional transfer training; Therapeutic exercise; Endurance training;Patient/family training;Equipment eval/education; Bed mobility; Compensatory technique education   Progress Progressing toward goals   Recommendation   OT Discharge Recommendation Home with home health rehabilitation   OT Therapy Minutes   OT Time In 1400   OT Time Out 1430   OT Total Time (minutes) 30   OT Mode of treatment - Individual (minutes) 30   OT Mode of treatment - Concurrent (minutes) 0   OT Mode of treatment - Group (minutes) 0   OT Mode of treatment - Co-treat (minutes) 0   OT Mode of Treatment - Total time(minutes) 30 minutes   OT Cumulative Minutes 6897   Therapy Time missed   Time missed?  No

## 2022-12-29 NOTE — PROGRESS NOTES
12/29/22 1430   Pain Assessment   Pain Assessment Tool 0-10   Pain Score No Pain   Restrictions/Precautions   Precautions Fall Risk;Multiple lines;Contact/isolation;Pain;Supervision on toilet/commode  (VICTORINO drain, ileostomy, IR drain)   Weight Bearing Restrictions No   ROM Restrictions No   Cognition   Overall Cognitive Status WFL   Arousal/Participation Cooperative   Attention Attends with cues to redirect   Orientation Level Oriented X4   Memory Within functional limits   Following Commands Follows one step commands with increased time or repetition   Sit to Stand   Type of Assistance Needed Physical assistance;Verbal cues; Adaptive equipment   Physical Assistance Level 25% or less   Comment MARTY this session with RW, inc time for hand placement   Sit to Stand CARE Score 3   Bed-Chair Transfer   Type of Assistance Needed Physical assistance; Incidental touching; Adaptive equipment   Physical Assistance Level 25% or less   Comment MIN/CGA with RW SPT, bed<>WC   Chair/Bed-to-Chair Transfer CARE Score 3   Transfer Bed/Chair/Wheelchair   Adaptive Equipment Roller Walker   Ambulation   Primary Mode of Locomotion Prior to Admission Walk   Findings not focus of session   Does the patient walk? 2  Yes   Wheel 50 Feet with Two Turns   Comment (S)  trial next session   Wheelchair mobility   Does the patient use a wheelchair? 1  Yes   Type of Wheelchair Used 1  Manual   Curb or Single Stair   Style negotiated Curb   Type of Assistance Needed Physical assistance;Verbal cues; Adaptive equipment   Physical Assistance Level Total assistance   Comment MIN/MODA x1 with CS of second person for safety on 6" curb step with RW    1 Step (Curb) CARE Score 1   4 Steps   Type of Assistance Needed Physical assistance;Verbal cues; Adaptive equipment   Physical Assistance Level 26%-50%   Comment (S)  BHR's on 6"  steps   Pt needs to trial RHR with B hands next session   4 Steps CARE Score 3   12 Steps   Reason if not Attempted Safety concerns   12 Steps CARE Score 88   Stairs   Type Stairs;Curb   # of Steps 4   Weight Bearing Precautions Fall Risk   Assist Devices Bilateral Rail   Findings retro descend on 6" steps   Picking Up Object   Comment (S)  can trial in upcoming sessions   Reason if not Attempted Safety concerns   Picking Up Object CARE Score 88   Therapeutic Interventions   Strengthening seated LAQ, hip flex, ankle DF/PF 3x10/2 x15 reps  x30 x2 glute sets   Flexibility BLE heel cord and HS gentle stretch TERT 4 minutes   Other (S)  Discussed providing measurements of steps and inside of 1/2 bath as pt is expected to maintain on 1st level of home  Educated on use of WC to step down into living area; pt can stand from [facility] to  to avoid step and turn to sit in [facility] when getting out of living area  Please trial this method with pt in upcoming sessions  Assessment   Treatment Assessment 60 min skilled PT session with cont focus on stair management and LE strengthening  Pt was able to amb up/down 4 steps with BHR's but to enter home will only have single rail on R  Discussed trialing single rail in next session, sideways with B hands on R rail  Pt in agreement ans given visual explanation  Pt asked to talk to wife for measurements of step heights and of 1/2 bath on first floor but replied "it will never happen"  Will need to follow up with wife during FT next week  Pt will cont to work on stair management to enter home, single curb step, improved act tolerance and HH dist amb  Cont POC as tolerated  Problem List Decreased strength;Decreased endurance;Decreased mobility; Impaired balance;Orthopedic restrictions;Pain   Barriers to Discharge Inaccessible home environment;Decreased caregiver support   PT Barriers   Functional Limitation Car transfers; Ramp negotiation;Stair negotiation;Standing;Transfers; Walking   Plan   Treatment/Interventions Functional transfer training;LE strengthening/ROM; Therapeutic exercise; Endurance training;Patient/family training;Bed mobility;Gait training   Progress Progressing toward goals   Recommendation   PT Discharge Recommendation Home with home health rehabilitation   Equipment Recommended Wheelchair;Walker  (hospital bed)   PT Therapy Minutes   PT Time In 1435   PT Time Out 1535   PT Total Time (minutes) 60   PT Mode of treatment - Individual (minutes) 60   PT Mode of treatment - Concurrent (minutes) 0   PT Mode of treatment - Group (minutes) 0   PT Mode of treatment - Co-treat (minutes) 0   PT Mode of Treatment - Total time(minutes) 60 minutes   PT Cumulative Minutes 930   Therapy Time missed   Time missed?  No

## 2022-12-29 NOTE — ASSESSMENT & PLAN NOTE
· WBC 10 52 (previous 8 26)  · 01/11-repeat CT abd/pelvis w/ contrast  · Febrile- blood cultures sent, Ertapenem restarted  · Monitor temps

## 2022-12-29 NOTE — PROGRESS NOTES
Progress Note - Infectious Disease   Dana Mata 61 y o  male MRN: [id number]  Unit/Bed#: -01 Encounter: [id number]      Impression/Recommendations:  Sepsis     Evolving 12/17:  WBC, HR   Suspect due to persistent left intra-abdominal infection in setting of complex history below, recently completed antibiotics   ROS and exam otherwise negative   Recent Flu/RSV/COVID PCR, CXR negative   Blood cultures negative   Improved with reinitiation of antibiotics, additional drain placement    Rec:  • Continue antibiotics as below  • Drains as below  • [address] [place] [address] Street  • Supportive care as per the primary service     Intra-abdominal abscess     In the setting complex surgical history as below   Initially developed 11/2022 companied by ESBL E  coli bacteremia   Status post exploratory laparotomy, right hemicolectomy, temporary abdominal closure 11/16; re-exploration, partial left colectomy, primary ileocolonic anastomosis with proximal diverting loop ileostomy, midline fascial closure on 11/17   More recently developed abscess in hepatectomy bed and collection +/- leak at area of prior colonic anastamosis, 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   Status post IR drain into perihepatic collection 12/8   Cultures with ESBL E  Coli   Status post 7 days ertapenem after drain placement through 12/15   Repeat CT 12/17 shows hepatic bed collection improved, persistent left intra-abdominal collection   Status post perigastic, perisplenic drains 12/20   Cultures again with ESBL E  coli  Rec:  • Continue ertapenem for 1 more days than transition to PO doxycycline  • Drains per IR - if outputs remain <10 cc, request tube check     Possible acute cholecystitis     Status post percutaneous cholecystostomy tube   Alk phos remains markedly elevated, possibly due to drain itself versus known intrahepatic metastases      Metastatic colon cancer   To liver, possible lung   Status post resection, chemotherapy, more recent hepatic resection and ablation, transverse colon resection      CKD   Baseline Cr 1 4     DM      Anemia   Status post multiple transfusions      Antibiotics:  Ertapenem #13    Subjective:  Patient seen on AM rounds  Denies fevers, chills, sweats, nausea, vomiting, or diarrhea  24 Hour Events:  No documented fevers, chills, sweats, nausea, vomiting, or diarrhea  Objective:  Vitals:  Temp:  [97 5 °F (36 4 °C)-98 4 °F (36 9 °C)] 97 5 °F (36 4 °C)  HR:  [] 75  Resp:  [18-20] 18  BP: (110-147)/(61-85) 128/74  SpO2:  [95 %-98 %] 98 %  Temp (24hrs), Av °F (36 7 °C), Min:97 5 °F (36 4 °C), Max:98 4 °F (36 9 °C)  Current: Temperature: 97 5 °F (36 4 °C)    Physical Exam:   General:  No acute distress  Psychiatric:  Awake and alert  Pulmonary:  Normal respiratory excursion without accessory muscle use  Abdomen:  Soft, nontender, drain #1 with purulent d/c, drain #2 with serosanguinous  Extremities:  No edema  Skin:  No rashes    Lab Results:  I have personally reviewed pertinent labs  Results from last 7 days   Lab Units 22  1522 22  0512   POTASSIUM mmol/L 3 8 3 9 4 1   CHLORIDE mmol/L 112* 110* 109*   CO2 mmol/L 21 17* 22   BUN mg/dL 21 23 25   CREATININE mg/dL 1 21 1 54* 1 41*   EGFR ml/min/1 73sq m 65 49 54   CALCIUM mg/dL 8 9 8 8 8 3   AST U/L 46* 51* 51*   ALT U/L 15 18 16   ALK PHOS U/L 952* 1,072* 983*     Results from last 7 days   Lab Units 22  1522 22  0512   WBC Thousand/uL 11 91* 13 98* 12 30*   HEMOGLOBIN g/dL 7 1* 7 7* 7 3*   PLATELETS Thousands/uL 476* 515* 373           Imaging Studies:   I have personally reviewed pertinent imaging study reports and images in PACS  EKG, Pathology, and Other Studies:   I have personally reviewed pertinent reports

## 2022-12-29 NOTE — PROGRESS NOTES
[facility] Occupational Therapy Daily Note  Patient Active Problem List   Diagnosis    Type 2 diabetes mellitus without complication, with long-term current use of insulin ([facility] [address] )    Benign essential hypertension    Mixed hyperlipidemia    Erectile dysfunction    Low testosterone    Obesity (BMI 30-39  9)    Testicular hypogonadism    Incarcerated umbilical hernia    Left ureteral calculus    Type 2 diabetes mellitus with hyperlipidemia (HCC)    Colonic mass    Microcytic anemia    Hypokalemia    Transaminitis    Thrombocytosis    Iron deficiency anemia, unspecified    Malignant neoplasm of transverse colon (HCC)    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)    Colon cancer metastasized to liver [facility])    Colostomy prolapse (HCC)    Other fatigue    Cervical radiculopathy    Encephalopathy    Flash pulmonary edema (HCC)    MR (mitral regurgitation)    Bacteremia    ESBL (extended spectrum beta-lactamase) producing bacteria infection    Acute respiratory failure with hypoxia (HCC)    Severe protein-calorie malnutrition ([facility] [address] )    Acute on chronic kidney failure (HCC)    Sepsis ([facility] [address] )    Abscess    Acute pain       Past Medical History:   Diagnosis Date    Abdominal pain 03/12/2022    Ac[facility] )     [id number] resolved    Cancer ([facility] [address] )     Diabetes mellitus ([facility] [address] )     Enteritis 08/23/2016    Gastroparesis due to DM ([facility] [address] ) 08/23/2016    GERD (gastroesophageal reflux disease)     Hernia, ventral 08/04/2016    Hyperlipidemia     Hypertension     Morbid obesity ([facility] [address] ) 04/17/2018    Postoperative visit 03/02/2022    SIRS (systemic inflammatory response syndrome) ([facility] ) 03/12/2022    Snoring     Stage 3a chronic kidney disease ([facility] ) 02/19/2022     Etiologic Diagnosis: Critical illness myopathy  Restrictions/Precautions  Precautions: Fall Risk, Contact/isolation, Multiple lines, Supervision on toilet/commode (VICTORINO drain, ileostomy, IR drain)  Weight Bearing Restrictions: No  ROM Restrictions: No  ADL Team Goal: Patient will require assist with ADLs with least restrictive device upon completion of rehab program  Occupational Therapy LTG's  Eating Oral care Bathing LB dress UB dress   Eating Goal: 06  Independent - Patient completes the activity by him/herself with no assistance from a helper  Oral Hygiene Goal: 06  Independent - Patient completes the activity by him/herself with no assistance from a helper  Shower/bathe self Goal: 04  Supervision or touching assistance- [place] provides VERBAL CUES or supervision throughout activity  Lower body dressing Goal: 04  Supervision or touching assistance- [place] provides VERBAL CUES or supervision throughout activity  Upper body dressing Goal: 04  Supervision or touching assistance- [place] provides VERBAL CUES or supervision throughout activity  Toileting Toilet txf Func txf IADL Med    Toileting hygiene Goal: 04  Supervision or touching assistance- [place] provides VERBAL CUES or supervision throughout activity  Toilet transfer Goal: 04  Supervision or touching assistance- [place] provides VERBAL CUES or supervision throughout activity  Assist Level: Supervision   OT interventions: Treatment/Interventions: ADL retraining, Functional transfer training, Therapeutic exercise, Endurance training, Patient/family training, Bed mobility, Equipment eval/education  Discharge Plan:  OT Discharge Recommendation: (P)  (home with family support and home OT)   DME: Equipment Recommended: (P)  (RW, WC, SHower chair/tub bench(if applicable)),  ,       48/43/66 [id number]   Pain Assessment   Pain Assessment Tool 0-10   Pain Score No Pain   Lifestyle   Autonomy "Im glad I can laugh about some of these things now "   Oral Hygiene   Type of Assistance Needed Independent   Comment seated at sink   Oral Hygiene CARE Score 6   Shower/Bathe Self   Type of Assistance Needed Physical assistance   Physical Assistance Level 26%-50%   Comment assist for buttocks in stance  james bathing in sitting     Shower/Bathe Self CARE Score 3   Upper Body Dressing   Type of Assistance Needed Set-up / clean-up   Comment assist for line management   Upper Body Dressing CARE Score 5   Lower Body Dressing   Type of Assistance Needed Incidental touching   Comment pt does engage in edu for lHAE for reahcer use to don shorts  Pt receptive, but req VC's for technique  Still cont to demo difficulty with socks on  in stance able to don shorts over hips   Lower Body Dressing CARE Score 4   Sit to Lying   Type of Assistance Needed Set-up / clean-up   Comment HOB elevated  Discussed pot need for hospital bed use  Pt wants to try bed mob from a regular bed next week to determine need  Sit to Lying CARE Score 5   Lying to Sitting on Side of Bed   Type of Assistance Needed Supervision   Comment HOB elevated   Lying to Sitting on Side of Bed CARE Score 4   Sit to Stand   Type of Assistance Needed Verbal cues; Incidental touching   Comment again VC's for hand technique from pushing from chair arms  Sit to Stand CARE Score 4   Bed-Chair Transfer   Type of Assistance Needed Physical assistance   Physical Assistance Level 76% or more   Comment Today during func mob from bathroom back to bed Pt has 1x major LOB unable to correct  leaning against bathroom door, req assist for correction  Pt actually remained calm and able to finish walk to bed  Chair/Bed-to-Chair Transfer CARE Score 2   Toileting Hygiene   Comment Pt engages in seated ostomy care  assist for holding canister  Next time focus on potentially sitting on toilet dependeing comfort  Pt does demo diffculty managing canister and bag components with UE neropathy  Pt able to complete urianl use in sitting with set up  Additional Activities   Additional Activities Comments seated unsupported sitting EOB with lateral rotation     Assessment   Treatment Assessment Pt participated in skilled OT session focusing on functional ADL retraining with seated sponge bath at sink, activity tolerance, activity modification, use of LHAE, and functional transfers  See above for details on ADL function  Pt continues to demonstrate improvements in overall ability to complete LB dressing with introduction to LHAE improving ability to complete tasks in stance  pt does require increased time to process new learning, cont to recommend gentle introduction to LHAE to inc independence with LB dressing     Prognosis Fair   Plan   Progress Progressing toward goals   Recommendation   OT Discharge Recommendation   (home with family support and home OT)   Equipment Recommended   (RW, WC, SHower chair/tub bench(if applicable))   OT Therapy Minutes   OT Time In 0930   OT Time Out 1030   OT Total Time (minutes) 60   OT Mode of treatment - Individual (minutes) 60   OT Mode of treatment - Concurrent (minutes) 0   OT Mode of treatment - Group (minutes) 0   OT Mode of treatment - Co-treat (minutes) 0   OT Mode of Treatment - Total time(minutes) 60 minutes   OT Cumulative Minutes 1005

## 2022-12-29 NOTE — PROGRESS NOTES
12/29/22 0830   Pain Assessment   Pain Assessment Tool 0-10   Pain Score No Pain   Restrictions/Precautions   Precautions Fall Risk;Pain;Multiple lines;Supervision on toilet/commode  ([place] drain, ileostomy, IR Drain)   Weight Bearing Restrictions No   ROM Restrictions No   Cognition   Overall Cognitive Status WFL   Arousal/Participation Cooperative   Attention Attends with cues to redirect   Orientation Level Oriented X4   Memory Within functional limits   Following Commands Follows one step commands with increased time or repetition   Sit to Lying   Type of Assistance Needed Supervision; Adaptive equipment   Comment VC's for positioning prior to laying down  Sit to Lying CARE Score 4   Lying to Sitting on Side of Bed   Type of Assistance Needed Supervision; Adaptive equipment   Lying to Sitting on Side of Bed CARE Score 4   Sit to Stand   Type of Assistance Needed Incidental touching;Physical assistance;Verbal cues; Adaptive equipment   Comment CS/CGA from EOB, MARTY from [facility] as pt fatigues  Sit to Stand CARE Score -   Bed-Chair Transfer   Type of Assistance Needed Incidental touching;Physical assistance; Adaptive equipment   Physical Assistance Level 25% or less   Comment MIN/CGA with RW SPT, bed<>WC   Chair/Bed-to-Chair Transfer CARE Score 3   Transfer Bed/Chair/Wheelchair   Adaptive Equipment Roller Walker   Car Transfer   Type of Assistance Needed Physical assistance;Verbal cues; Adaptive equipment   Physical Assistance Level 26%-50%   Comment MODA to stand from seat, otherwise MARTY  Car Transfer CARE Score 3   Ambulation   Primary Mode of Locomotion Prior to Admission Walk   Findings not focus of session   Wheelchair mobility   Findings not focus on session   Picking Up Object   Comment can trial in upcoming sessions   Reason if not Attempted Safety concerns   Picking Up Object CARE Score 88   Assessment   Treatment Assessment Brief 30 min session with increased focus on functional transfers and car transfer  Pt able to SPT well this session only requiring CGA to [facility] no overt LOB seen  Pt required MODA from car with cushion placed to add height  Discussed setting up car transfer next week as wife will be here for FT  Cont POC as tolerated with cont focus on stair training and general endurance training  Problem List Decreased strength;Decreased endurance;Decreased mobility; Impaired balance;Orthopedic restrictions;Pain   Barriers to Discharge Inaccessible home environment;Decreased caregiver support   PT Barriers   Functional Limitation Car transfers; Ramp negotiation;Stair negotiation;Standing;Transfers; Walking   Plan   Treatment/Interventions Functional transfer training;LE strengthening/ROM; Therapeutic exercise; Endurance training;Patient/family training;Bed mobility;Gait training   Progress Progressing toward goals   Recommendation   PT Discharge Recommendation Home with home health rehabilitation   Equipment Recommended Wheelchair;Walker  (hospital)   PT Therapy Minutes   PT Time In 0830   PT Time Out 0900   PT Total Time (minutes) 30   PT Mode of treatment - Individual (minutes) 30   PT Mode of treatment - Concurrent (minutes) 0   PT Mode of treatment - Group (minutes) 0   PT Mode of treatment - Co-treat (minutes) 0   PT Mode of Treatment - Total time(minutes) 30 minutes   PT Cumulative Minutes 870   Therapy Time missed   Time missed?  No

## 2022-12-29 NOTE — PROGRESS NOTES
PM&R PROGRESS NOTE:  Eleni Nuñez 61 y o  male MRN: [id number]  Unit/Bed#: -97 Encounter: [id number]        Rehabilitation Diagnosis: Impairment of mobility, safety, Activities of Daily Living (ADLs), and cognitive/communication skills due to Neurologic Conditions:  03 8  Neuromuscular Disorders Critical Illness Myopathy    SUBJECTIVE: Patient seen face to face today in his room  Feeling less anxious today about his target discharge date  Patient reports the same left sided lower abdominal pain while lying supine  He is hopeful he will be going home with less tubes in his abdomen  Relayed tube check order will be placed since 2 or his current abscess drains have minimal output  Patient with overall improved PO intake  Progressing in rehabilitation with less physical assistance  ASSESSMENT: Stable, progressing      PLAN:    Rehabilitation  • Functional deficits:  Decreased endurance, strength, impaired balance, impaired mobility/self care  • Continue current rehabilitation plan of care to maximize function  • Expected Discharge:  ELOS 10-12 days  Target date 1/9/23 with home care  o Education for wife and family on tube management  o Wound care education      Currently function:   Physical Therapy Occupational Therapy Speech Therapy   Weight Bearing Status: Full Weight Bearing  Transfers: Minimal Assistance  Bed Mobility: Moderate Assistance (S OOB using features of hospital bed; assist BLE to transfer backt to bed)  Amulation Distance (ft): 50 feet  Ambulation: Moderate Assistance  Assistive Device for Ambulation: Roller Walker  Number of Stairs: 2  Assistive Device for Stairs: Bilateral Hand Rails  Stair Assistance: Total Assistance (mod Ax2; drop in BP)  Discharge Recommendations: Home with:  [address] with[de-identified] First Floor Setup, Family Support, 24 Hour Assisteance, Home Physical Therapy   Eating: Supervision  Grooming: Supervision  Bathing: Moderate Assistance  Bathing:  Moderate Assistance  Upper Body Dressing: Moderate Assistance  Lower Body Dressing: Assist of 2  Toileting: Assist of 2  Tub/Shower Transfer:  (N/A at this time, multiple drains/lines)  Toilet Transfer: Moderate Assistance  Cognition: Within Defined Limits  Orientation: Person, Place, Time, Situation                   DVT prophylaxis  • Managed on SQ Heparin    Bladder plan  • Continent    Bowel plan  • Continent ostomy output      * Malignant neoplasm of transverse colon (HCC)  Assessment & Plan  · Transverse colonic apple core lesion and innumerable hepatic metastases  · S/p Colostomy placement 2/22  · RCW port-a-cath, accessed  · S/p palliative chemo  · Follows with   [facility] (palliative)  · Follows hem/onc (Dr Tiffanie Gomez)    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 liver [facility])  Assessment & Plan  · MRI-multiple hepatic metastasis; smaller lesions in left lobe, larger lesions in right lobe  · 11/7 Exp Lap, resection of hepatic segment 3, resection of transverse colon with reversal of loop colostomy (Dr Petrona Foss)  · 11/16- right hemicolectomy, left discontinuity and temporary abdominal closure device placed  · 11/17- re-exploration, partial descending colectomy, primary colocolonic anastomosis with diverting loop ileostomy, midline VAC placement  · CT showed abdominal abscess and distended gallbladder  · 12/8- IR percutaneous cholecystostomy tube, hepatectomy abscess drain placement  ·  IR on 12/20 additional drains placed for a perisplenic abscess as well as a splenic recess abscess  · Total of 3 drains in place  Abscess  Assessment & Plan  · Abdominal abscess- ESBL E Coli  · Zosyn completed  · Remains on Ertapenem per ID  With plan to transition to PO doxy tomorrow  · Contact precautions  · 3 drains placed by IR  · Monitor 3 drain output:  · Abscess drain abdomen A: 0 cc   · Abscess drain back: 0-10 cc  · Abscess drain abdomen B: 15 - 20cc    TUBE CHECK ordered for IR    Patient also having some discomfort in left abdomen while supine - position check and consider removal of those not draining  Sepsis ([address] )  Assessment & Plan  · SIRS versus sepsis at this time given his vitals, leukocytosis, and complicated infection history  · Mgmt per ID, IM  · 12/20-IR perisplenic, perigastric drain placement for 2 additional abscesses-  · Perigastric abscess +E  Coli ESBL  · Blood cultures negative     Leukocytosis  Assessment & Plan  WBC trending down 11 91K    Acute pain  Assessment & Plan  Managed on Scheduled Tylenol 975 mg [id number]  Managed on Robaxin 500 mg BId  Oxycodone IR PRN  Moderately managed     Acute on chronic kidney failure (HCC)  Assessment & Plan  · Creatinine baseline 1 2-1 4  · Cr = 1 21  · Monitor BMP    Bacteremia  Assessment & Plan  · Remains on Abx     Iron deficiency anemia, unspecified  Assessment & Plan  · Hbg = 7 1   · Type and Screen in AM   May require transfusion if patient consents   · 12/15-1 unit PRBc  · 12/16- symptomatic- 1 unit PRBc  · Monitor CBC    Obesity (BMI 30-39  9)  Assessment & Plan  · BMI 33 0    Benign essential hypertension  Assessment & Plan  · Home: Norvasc 5 mg BID  · Here: Norvasc 5 mg BID  · Adjust medication as needed    Type 2 diabetes mellitus without complication, with long-term current use of insulin ([facility])  Assessment & Plan  · HbgA1c 8 0 (9/22)  · Home monitoring with Saeed Cavazos  · Home: Lantus 12 units, Humalog 3 units with meals, Januvia  · Here: Lantus 6 units,  Humalog 6 units with meals, SSI, Lantus   · Follow with PCP (Dr Reggie Manning)          [facility] consultants medical co-management  Labs, medications, and imaging personally reviewed  ROS:  A ten point review of systems was completed on 12/29/22 and pertinent positives are listed in subjective section  All other systems reviewed were negative         OBJECTIVE:   /74   Pulse 75   Temp 97 5 °F (36 4 °C) (Oral)   Resp 18   Ht 5' 10" (1 778 m)   Wt 104 kg (230 lb)   SpO2 98%   BMI 33 00 kg/m²     Physical Exam  Vitals and nursing note reviewed  Constitutional:       General: He is not in acute distress  HENT:      Head: Normocephalic and atraumatic  Nose: Nose normal       Mouth/Throat:      Mouth: Mucous membranes are moist    Eyes:      Conjunctiva/sclera: Conjunctivae normal    Cardiovascular:      Rate and Rhythm: Normal rate and regular rhythm  Pulses: Normal pulses  Pulmonary:      Effort: Pulmonary effort is normal       Breath sounds: Normal breath sounds  No wheezing or rales  Abdominal:      General: Bowel sounds are normal  There is no distension  Palpations: Abdomen is soft  Tenderness: There is abdominal tenderness (left side while supine)  Musculoskeletal:         General: No swelling  Cervical back: Neck supple  Skin:     General: Skin is warm  Comments: Abdominal incisional dehiscence with slough - see media photo below  3 VICTOIRNO drains securely in place (1 abdomen and 1 back is draining minimally)  Perc Alvina tube in place with brownish bilious fluid draining   Neurological:      Mental Status: He is alert and oriented to person, place, and time  Motor: Weakness (proximal musculature and core weakness) present     Psychiatric:         Mood and Affect: Mood normal                 Lab Results   Component Value Date    WBC 11 91 (H) 12/28/2022    HGB 7 1 (L) 12/28/2022    HCT 23 8 (L) 12/28/2022    MCV 88 12/28/2022     (H) 12/28/2022     Lab Results   Component Value Date    SODIUM 139 12/28/2022    K 3 8 12/28/2022     (H) 12/28/2022    CO2 21 12/28/2022    BUN 21 12/28/2022    CREATININE 1 21 12/28/2022    GLUC 119 12/28/2022    CALCIUM 8 9 12/28/2022     Lab Results   Component Value Date    INR 1 12 12/19/2022    INR 1 10 11/12/2022    INR 1 24 (H) 11/09/2022    PROTIME 14 7 (H) 12/19/2022    PROTIME 14 4 11/12/2022    PROTIME 15 8 (H) 11/09/2022           Current Facility-Administered Medications:   •  acetaminophen (TYLENOL) tablet 975 mg, 975 mg, Oral, Q8H [address], GAURANG Keith, 975 mg at 12/29/22 6663  •  amLODIPine (NORVASC) tablet 5 mg, 5 mg, Oral, BID, GAURANG Keith, 5 mg at 12/29/22 0806  •  aspirin chewable tablet 81 mg, 81 mg, Oral, Daily, GAURANG Keith, 81 mg at 12/29/22 0802  •  atorvastatin (LIPITOR) tablet 40 mg, 40 mg, Oral, Daily, GAURANG Keith, 40 mg at 12/29/22 0802  •  calcium carbonate (TUMS) chewable tablet 500 mg, 500 mg, Oral, BID PRN, GAURANG Keith, 500 mg at 12/28/22 1550  •  cholecalciferol (VITAMIN D3) tablet 400 Units, 400 Units, Oral, Daily, GAURANG Keith, 400 Units at 12/29/22 0803  •  collagenase (SANTYL) ointment, , Topical, Daily, Johnna Lee MD  •  ertapenem Vernel Isha) 1,000 mg in sodium chloride 0 9 % 50 mL IVPB, 1,000 mg, Intravenous, Q24H, Oni Garland MD, Stopped at 12/28/22 2100  •  heparin (porcine) subcutaneous injection 5,000 Units, 5,000 Units, Subcutaneous, Q8H [address], GAURANG Keith, 5,000 Units at 12/29/22 7365  •  insulin glargine (LANTUS) subcutaneous injection 6 Units 0 06 mL, 6 Units, Subcutaneous, HS, GAURANG Graf, 6 Units at 12/28/22 2156  •  insulin lispro (HumaLOG) 100 units/mL subcutaneous injection 1-5 Units, 1-5 Units, Subcutaneous, HS, GAURANG Keith, 1 Units at 12/22/22 2130  •  insulin lispro (HumaLOG) 100 units/mL subcutaneous injection 1-6 Units, 1-6 Units, Subcutaneous, TID AC, 1 Units at 12/29/22 1132 **AND** Fingerstick Glucose (POCT), , , TID AC, GAURANG Keith  •  insulin lispro (HumaLOG) 100 units/mL subcutaneous injection 6 Units, 6 Units, Subcutaneous, TID With Meals, GAURANG Graf, 6 Units at 12/29/22 1133  •  iohexol (OMNIPAQUE) 240 MG/ML solution 50 mL, 50 mL, Oral, 90 min pre-procedure, Oni Garland MD  •  melatonin tablet 3 mg, 3 mg, Oral, HS, GAURANG Keith, 3 mg at 12/28/22 2156  •  methocarbamol (ROBAXIN) tablet 500 mg, 500 mg, Oral, BID, GAURANG Keith, 500 mg at 12/29/22 4375  • multivitamin-minerals (CENTRUM) tablet 1 tablet, 1 tablet, Oral, Daily, GAURANG Keith, 1 tablet at 12/29/22 0802  •  ondansetron (ZOFRAN-ODT) dispersible tablet 4 mg, 4 mg, Oral, Q6H PRN, GAURANG Keith, 4 mg at 12/28/22 1333  •  oxyCODONE (ROXICODONE) immediate release tablet 10 mg, 10 mg, Oral, Q6H PRN, GAURANG Keith, 10 mg at 12/28/22 2155  •  oxyCODONE (ROXICODONE) IR tablet 2 5 mg, 2 5 mg, Oral, Q3H PRN, GAURANG Keith, 2 5 mg at 12/25/22 1911  •  oxyCODONE (ROXICODONE) IR tablet 5 mg, 5 mg, Oral, Q6H PRN, GAURANG Keith, 5 mg at 12/26/22 2106  •  pantoprazole (PROTONIX) EC tablet 40 mg, 40 mg, Oral, BID AC, GAURANG Keith, 40 mg at 12/29/22 7919  •  simethicone (MYLICON) chewable tablet 80 mg, 80 mg, Oral, 4x Daily (with meals and at bedtime), GAURANG Keith, 80 mg at 12/29/22 1134  •  tamsulosin (FLOMAX) capsule 0 4 mg, 0 4 mg, Oral, Daily With Dinner, [place] Later, GAURANG, 0 4 mg at 12/28/22 1550    Past Medical History:   Diagnosis Date   • Abdominal pain 03/12/2022   • Acute renal failure ([place] [address] )     [id number] resolved   • Cancer ([place] [address] )    • Diabetes mellitus ([place] [address] )    • Enteritis 08/23/2016   • Gastroparesis due to DM ([address] ) 08/23/2016   • GERD (gastroesophageal reflux disease)    • Hernia, ventral 08/04/2016   • Hyperlipidemia    • Hypertension    • Morbid obesity ([address] ) 04/17/2018   • Postoperative visit 03/02/2022   • SIRS (systemic inflammatory response syndrome) ([place] [address] ) 03/12/2022   • Snoring    • Stage 3a chronic kidney disease ([facility] ) 02/19/2022       Patient Active Problem List    Diagnosis Date Noted   • Malignant neoplasm of transverse colon ([address] ) 03/01/2022   • 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 liver ([address] ) 03/02/2022   • Abscess 12/27/2022   • Sepsis ([address] ) 12/17/2022   • Leukocytosis 12/29/2022   • Acute pain 12/27/2022   • Severe protein-calorie malnutrition ([facility] ) 12/14/2022   • Acute on chronic kidney failure ([address] ) 12/14/2022   • Flash pulmonary edema ([address] ) 11/12/2022   • MR (mitral regurgitation) 11/12/2022   • Bacteremia 11/12/2022   • ESBL (extended spectrum beta-lactamase) producing bacteria infection 11/12/2022   • Acute respiratory failure with hypoxia ([facility] ) 11/12/2022   • Encephalopathy 11/09/2022   • Cervical radiculopathy 10/26/2022   • Other fatigue 06/15/2022   • Colostomy prolapse ([address] ) 05/27/2022   • Colon cancer metastasized to liver ([facility] ) 03/12/2022   • Iron deficiency anemia, unspecified 03/01/2022   • Thrombocytosis 02/21/2022   • Hypokalemia 02/19/2022   • Transaminitis 02/19/2022   • Type 2 diabetes mellitus with hyperlipidemia ([facility] ) 02/18/2022   • Colonic mass 02/18/2022   • Microcytic anemia 02/18/2022   • Left ureteral calculus 01/30/2020   • Incarcerated umbilical hernia 08/09/3423   • Testicular hypogonadism 06/19/2017   • Low testosterone 05/30/2017   • Type 2 diabetes mellitus without complication, with long-term current use of insulin ([facility] ) 08/23/2016   • Benign essential hypertension 08/23/2016   • Mixed hyperlipidemia 08/23/2016   • Erectile dysfunction 07/11/2016   • Obesity (BMI 30-39 9) 07/11/2016          Zeenat Ny MD    Total time spent:  35 minutes with more than 50% spent counseling/coordinating care  Counseling includes discussion with patient re: progress and discussion with patient of his/her current medical/functional state/information  Coordination of patient's care was performed in conjunction with consulting services  Time invested included review of patient's labs, vitals, and management of their comorbidities with continued monitoring  The care of the patient was extensively discussed and appropriate treatment plan was formulated unique for this patient  ** Please Note:  voice to text software may have been used in the creation of this document   Although proof errors in transcription or interpretation are a potential of such software**

## 2022-12-29 NOTE — PROGRESS NOTES
Internal Medicine Progress Note  Patient: Sana Zaragoza  Age/sex: 61 y o  male  Medical Record #: [id number]      ASSESSMENT/PLAN: (Interval History)  Sana Zaragoza is seen and examined and management for following issues:    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  • s/p hepatic resection and reversal of colostomy on 11/7  • s/p right hemicolectomy with partial descending colectomy colocolonic anastomosis with loop ileostomy and mid line abdominal VAC placement 11/16  • Continue local wound care; WC following  • CT  Abd/pelvis 12/17 = loculated collection along splenic recess   Has perisplenic hilum collection as well --> to IR 12/20 for drain placement in both perigastric and perisplenic area and cx sent from both areas = Ecoli  • Flush drains with 10ml qd; tube check 2 weeks 1/3/23  • 12/28 drainage = 15/2/10 ml  • S-O following      Acute cholecystitis  • s/p percutaneous tube placement 12/8 with tube check on 12/12, 12/14  • GI saw due to alk phos elevation = felt 2/2 infiltrative disease rather than focal biliary obstruction   AMA was negative     • Alkaline phosphatase isoenzyme sent 12/17 (48% liver/52% bone) = GI  recommended continuing to monitor his LFTs and defer to surgical oncology for further w/u and plan  • TB improving = to consider MRCP if total bili trends back up  • Alvina tube = 45 ml 12/28     Bacteremia/abdominal abscess  • ID following and continuing Ertapenem thru 12/30 then switch to doxycycline  • Blood cultures negative    • CT abdomen and pelvis as above  • Has ongoing night sweats     Hypertension  • On Norvasc 5mg BID  • stable     Diabetes mellitus  • On Lantus 12U qhs/Lispro 6U TID  • Continue DM diet and QID Accuchecks/SSI  • BS stable no changes today     Acute on chronic renal failure  • Stage III; baseline 1 2-1 4  • Lisinopril currently on hold  • s/p NSS IVF with improvement of creat =  back down to 1 2 12/28/22     Anemia  • Multifactorial due to recent infection, surgery, CKD stage III  • Transfused on 12/15 for hemoglobin 7 3 and 12/16/22 for hgb of 8   • No transfusion for now per pt request but agreeable if it drops below 7  • On 12/28 was 7 1  • Recheck today is pending     Atypical chest pain  • With elevation in troponin/EKG changes  • Cardiology cleared pt for discharge  • Did not recommend any further work up  • No further chest pain     Situational depression  • Neuropsychology consulted  • Patient not interested in medications at this point in time     Malnutrition  • Recommend dietician consultation to optimize wound healing  • Glucerna makes him nauseated  • On 12/15, ordered double protein     Pleural effusion  • CXR on 12/14 showed small left pleural effusion and was suspect for CHF  • ECHO 11/9/22 = LVEF 55%, unable to assess diastolic function, mild TR     L sided pleuritic pain  • located near drain site and says only occurs at night he says  • No evidence of infection        Discharge date:  1/9/23       The above assessment and plan was reviewed and updated as determined by my evaluation of the patient on 12/29/2022      Labs:   Results from last 7 days   Lab Units 12/28/22  0526 12/27/22  1522   WBC Thousand/uL 11 91* 13 98*   HEMOGLOBIN g/dL 7 1* 7 7*   HEMATOCRIT % 23 8* 24 7*   PLATELETS Thousands/uL 476* 515*     Results from last 7 days   Lab Units 12/28/22  0526 12/27/22  1522   SODIUM mmol/L 139 136   POTASSIUM mmol/L 3 8 3 9   CHLORIDE mmol/L 112* 110*   CO2 mmol/L 21 17*   BUN mg/dL 21 23   CREATININE mg/dL 1 21 1 54*   CALCIUM mg/dL 8 9 8 8             Results from last 7 days   Lab Units 12/29/22  0641 12/29/22  0203 12/28/22  2100   POC GLUCOSE mg/dl 140 157* 139       Review of Scheduled Meds:  Current Facility-Administered Medications   Medication Dose Route Frequency Provider Last Rate   • acetaminophen  975 mg Oral Q8H [address] GAURANG Keith     • alteplase  2 mg Intracatheter Once Karyna Larkin MD     • amLODIPine  5 mg Oral BID GAURANG Montenegro     • aspirin  81 mg Oral Daily GAURANG Keith     • atorvastatin  40 mg Oral Daily GAURANG Keith     • calcium carbonate  500 mg Oral BID PRN GAURANG Montenegro     • cholecalciferol  400 Units Oral Daily GAURANG Keith     • ertapenem  1,000 mg Intravenous Q24H Krish Castillo MD Stopped (12/28/22 2100)   • heparin (porcine)  5,000 Units Subcutaneous Q8H [address] GAURANG Keith     • insulin glargine  6 Units Subcutaneous HS GAURANG Portillo     • insulin lispro  1-5 Units Subcutaneous HS GAURANG Keith     • insulin lispro  1-6 Units Subcutaneous TID AC GAURANG Keith     • insulin lispro  6 Units Subcutaneous TID With Meals GAURANG Portillo     • iohexol  50 mL Oral 90 min pre-procedure Krish Castillo MD     • melatonin  3 mg Oral HS GAURANG Keith     • methocarbamol  500 mg Oral BID GAURANG Montenegro     • multivitamin-minerals  1 tablet Oral Daily GAURANG Keith     • ondansetron  4 mg Oral Q6H PRN GAURANG Montenegro     • oxyCODONE  10 mg Oral Q6H PRN GAURANG Montenegro     • oxyCODONE  2 5 mg Oral Q3H PRN GAURANG Montenegro     • oxyCODONE  5 mg Oral Q6H PRN GAURANG Keith     • pantoprazole  40 mg Oral BID AC GAURANG Keith     • simethicone  80 mg Oral 4x Daily (with meals and at bedtime) GAURANG Montenegro     • tamsulosin  0 4 mg Oral Daily With Dinner GAURANG Montenegro         Subjective/ HPI: Patient seen and examined  Patients overnight issues or events were reviewed with nursing or staff during rounds or morning huddle session  New or overnight issues include the following:     No new or overnight issues  Offers no complaints    ROS:   A 10 point ROS was performed; negative except as noted above         *Labs /Radiology studies reviewed  *Medications reviewed and reconciled as needed  *Please refer to order section for additional ordered labs studies  *Case discussed with primary attending during morning huddle case rounds    Physical Examination:  Vitals:   Vitals:    12/28/22 1707 12/28/22 2102 12/29/22 0636 12/29/22 0806   BP: 147/77 146/85 138/76 128/74   BP Location: Left leg Right arm Left arm    Pulse: 105 (!) 108 75    Resp:  20 18    Temp:  97 9 °F (36 6 °C) 97 5 °F (36 4 °C)    TempSrc:  Oral Oral    SpO2:  95% 98%    Weight:       Height:           General Appearance: no distress, conversive  HEENT:  External ear normal   Nose normal w/o drainage  Mucous membranes are moist  Oropharynx is clear  Conjunctiva clear w/o icterus or redness  Neck:  Supple, normal ROM  Lungs: BBS without crackles/wheeze/rhonchi; respirations unlabored with normal inspiratory/expiratory effort  No retractions noted  On RA  CV: regular rate and rhythm; no rubs/murmurs/gallops, PMI normal   ABD: Abdomen is soft  Bowel sounds all quadrants  Nontender with no distention  JPs draining = one is sl cloudy yellow, one milky tan (as always) and one is SS  Perc radha tube dark red drainage  EXT: no edema  Skin: normal turgor, normal texture, no rashes  Psych: affect normal, mood normal  Neuro: AAO      The above physical exam was reviewed and updated as determined by my evaluation of the patient on 12/29/2022  Invasive Devices     Central Venous Catheter Line  Duration           Port A Cath 03/10/22 Right Chest 293 days          Drain  Duration           Ileostomy LUQ 41 days    Abscess Drain Abdomen 20 days    Cholecystostomy Tube 16 days    Abscess Drain Abdomen 8 days    Abscess Drain Back 8 days                   VTE Pharmacologic Prophylaxis: Heparin  Code Status: Level 1 - Full Code  Current Length of Stay: 15 day(s)      Total time spent:  30 minutes with more than 50% spent counseling/coordinating care  Counseling includes discussion with patient re: progress  and discussion with patient of his/her current medical state/information  Coordination of patient's care was performed in conjunction with primary service  Time invested included review of patient's labs, vitals, and management of their comorbidities with continued monitoring  In addition, this patient was discussed with medical team including physician and advanced extenders  The care of the patient was extensively discussed and appropriate treatment plan was formulated unique for this patient  Medical decision making for the day was made by supervising physician unless otherwise noted in their attestation statement  ** Please Note:  voice to text software may have been used in the creation of this document   Although proof errors in transcription or interpretation are a potential of such software**

## 2022-12-29 NOTE — WOUND OSTOMY CARE
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  PMH: HTN, HLD, Ventral Hernia, GERD  Patient seen laying on p-500 specialty mattress  Patient reports tiredness and fatigue on assessment today  Refused sacro-buttocks assessment and reports no pain or wounds in area  Patient reports pouch was recently changed and states confidence in performing pouch changes at home  Findings:  B/L heels are dry intact and ramon with no skin loss or wounds present  Recommend preventative Hydraguard Cream and proper offloading/ repositioning  1  Midline Incision and Right abdomen Wound: right abdomen wound is 100% eschar/scab that is well adhered and dry  No longer needs mesalt packing to area  Midline incision wound has decreased in length, witdth, and depth  Still significant full thickness depth  Wound bed with stable in size islands of tan/yellow moist looking slough tissue  Other aspects of wound bed are pink granulation tissue  Nisa-wound is intact and fragile with no rolled edges, tunneling or undermining noted  Small amount of serosanguinous/yellow drainage noted  Will switch to Santyl to assist with debridement of slough  If slough does not assist with debridement, patient will likely require sharps debridement of area  Attending made aware of findings  No induration, fluctuance, odor, warmth/temperature differences, redness, or purulence noted to the above noted wounds and skin areas assessed  New dressings applied per orders listed below  Patient tolerated well- no s/s of non-verbal pain or discomfort observed during the encounter  Bedside nurse aware of plan of care  See flow sheets for more detailed assessment findings        Orders listed below and wound care will continue to follow, call or tiger text with questions  Skin Care Plan:  1-Midline Incision and Right Abdomen Wounds: Cleanse wounds with NS and pat dry  Apply Santyl to slough tissue on wound bed nickel thick  Cover with ABD and secure with minimal tape  Change daily or PRN soilage or displacement  2-Turn/reposition q2h or when medically stable for pressure re-distribution on skin   3-Elevate heels to offload pressure  4-Moisturize skin daily with skin nourishing cream  5-Ehob cushion in chair when out of bed  6-Preventative Hydraguard to bilateral heels and bilateral sacro-buttocks BID and PRN      WOUNDS:  Wound 11/07/22 Abdomen N/A (Active)   Wound Image   12/29/22 0820   Wound Description Granulation tissue; Yellow;Slough 12/29/22 0820   Nisa-wound Assessment Fragile; Intact 12/29/22 0820   Wound Length (cm) 19 cm 12/29/22 0820   Wound Width (cm) 2 cm 12/29/22 0820   Wound Depth (cm) 1 2 cm 12/29/22 0820   Wound Surface Area (cm^2) 38 cm^2 12/29/22 0820   Wound Volume (cm^3) 45 6 cm^3 12/29/22 0820   Calculated Wound Volume (cm^3) 45 6 cm^3 12/29/22 0820   Change in Wound Size % 90 69 12/29/22 0820   Tunneling 0 cm 12/29/22 0820   Tunneling in depth located at 0 12/29/22 0820   Undermining 0 12/29/22 0820   Undermining is depth extending from 0 12/29/22 0820   Wound Site Closure DONNA 12/29/22 0820   Drainage Amount Small 12/29/22 0820   Drainage Description Serosanguineous; Yellow 12/29/22 0820   Non-staged Wound Description Full thickness 12/29/22 0820   Treatments Cleansed;Site care;Irrigation with NSS 12/29/22 0820   Dressing Mesalt;ABD 12/29/22 0820   Wound packed? Yes 12/29/22 0820   Packing- # removed 1 12/29/22 0820   Packing- # inserted 1 12/29/22 0820   Dressing Changed Changed 12/29/22 0820   Patient Tolerance Tolerated well 12/29/22 0820   Dressing Status Clean;Dry; Intact 12/29/22 0820                Paxton Flor RN, BSN

## 2022-12-30 PROBLEM — R74.8 ELEVATED ALKALINE PHOSPHATASE LEVEL: Status: ACTIVE | Noted: 2022-12-30

## 2022-12-30 PROBLEM — T81.31XA DEHISCENCE OF INCISION: Status: ACTIVE | Noted: 2022-12-30

## 2022-12-30 LAB
ABO GROUP BLD: NORMAL
ALBUMIN SERPL BCP-MCNC: 1.6 G/DL (ref 3.5–5)
ALP SERPL-CCNC: 1066 U/L (ref 46–116)
ALT SERPL W P-5'-P-CCNC: 16 U/L (ref 12–78)
ANION GAP SERPL CALCULATED.3IONS-SCNC: 6 MMOL/L (ref 4–13)
ANISOCYTOSIS BLD QL SMEAR: PRESENT
AST SERPL W P-5'-P-CCNC: 46 U/L (ref 5–45)
BASOPHILS # BLD MANUAL: 0 THOUSAND/UL (ref 0–0.1)
BASOPHILS NFR MAR MANUAL: 0 % (ref 0–1)
BILIRUB SERPL-MCNC: 0.75 MG/DL (ref 0.2–1)
BLD GP AB SCN SERPL QL: NEGATIVE
BUN SERPL-MCNC: 20 MG/DL (ref 5–25)
CALCIUM ALBUM COR SERPL-MCNC: 10.9 MG/DL (ref 8.3–10.1)
CALCIUM SERPL-MCNC: 9 MG/DL (ref 8.3–10.1)
CHLORIDE SERPL-SCNC: 112 MMOL/L (ref 96–108)
CO2 SERPL-SCNC: 21 MMOL/L (ref 21–32)
CREAT SERPL-MCNC: 1.23 MG/DL (ref 0.6–1.3)
DACRYOCYTES BLD QL SMEAR: PRESENT
EOSINOPHIL # BLD MANUAL: 0.2 THOUSAND/UL (ref 0–0.4)
EOSINOPHIL NFR BLD MANUAL: 2 % (ref 0–6)
ERYTHROCYTE [DISTWIDTH] IN BLOOD BY AUTOMATED COUNT: 17.7 % (ref 11.6–15.1)
GFR SERPL CREATININE-BSD FRML MDRD: 64 ML/MIN/1.73SQ M
GLUCOSE SERPL-MCNC: 149 MG/DL (ref 65–140)
GLUCOSE SERPL-MCNC: 150 MG/DL (ref 65–140)
GLUCOSE SERPL-MCNC: 158 MG/DL (ref 65–140)
GLUCOSE SERPL-MCNC: 171 MG/DL (ref 65–140)
GLUCOSE SERPL-MCNC: 188 MG/DL (ref 65–140)
HCT VFR BLD AUTO: 23.2 % (ref 36.5–49.3)
HGB BLD-MCNC: 7 G/DL (ref 12–17)
LYMPHOCYTES # BLD AUTO: 0.51 THOUSAND/UL (ref 0.6–4.47)
LYMPHOCYTES # BLD AUTO: 5 % (ref 14–44)
MACROCYTES BLD QL AUTO: PRESENT
MCH RBC QN AUTO: 26.1 PG (ref 26.8–34.3)
MCHC RBC AUTO-ENTMCNC: 29.3 G/DL (ref 31.4–37.4)
MCV RBC AUTO: 89 FL (ref 82–98)
MONOCYTES # BLD AUTO: 0.61 THOUSAND/UL (ref 0–1.22)
MONOCYTES NFR BLD: 6 % (ref 4–12)
NEUTROPHILS # BLD MANUAL: 8.83 THOUSAND/UL (ref 1.85–7.62)
NEUTS SEG NFR BLD AUTO: 87 % (ref 43–75)
PLATELET # BLD AUTO: 501 THOUSANDS/UL (ref 149–390)
PLATELET BLD QL SMEAR: ABNORMAL
PMV BLD AUTO: 9.7 FL (ref 8.9–12.7)
POIKILOCYTOSIS BLD QL SMEAR: PRESENT
POTASSIUM SERPL-SCNC: 4.1 MMOL/L (ref 3.5–5.3)
PROT SERPL-MCNC: 8 G/DL (ref 6.4–8.4)
RBC # BLD AUTO: 2.61 MILLION/UL (ref 3.88–5.62)
RBC MORPH BLD: PRESENT
RH BLD: POSITIVE
SODIUM SERPL-SCNC: 139 MMOL/L (ref 135–147)
SPECIMEN EXPIRATION DATE: NORMAL
TARGETS BLD QL SMEAR: PRESENT
WBC # BLD AUTO: 10.15 THOUSAND/UL (ref 4.31–10.16)

## 2022-12-30 PROCEDURE — 0HD7XZZ EXTRACTION OF ABDOMEN SKIN, EXTERNAL APPROACH: ICD-10-PCS | Performed by: SURGERY

## 2022-12-30 RX ORDER — DOXYCYCLINE HYCLATE 100 MG/1
100 CAPSULE ORAL EVERY 12 HOURS SCHEDULED
Status: DISCONTINUED | OUTPATIENT
Start: 2022-12-30 | End: 2023-01-03

## 2022-12-30 RX ADMIN — DOXYCYCLINE 100 MG: 100 CAPSULE ORAL at 12:24

## 2022-12-30 RX ADMIN — SIMETHICONE 80 MG: 80 TABLET, CHEWABLE ORAL at 16:50

## 2022-12-30 RX ADMIN — TAMSULOSIN HYDROCHLORIDE 0.4 MG: 0.4 CAPSULE ORAL at 16:50

## 2022-12-30 RX ADMIN — INSULIN LISPRO 1 UNITS: 100 INJECTION, SOLUTION INTRAVENOUS; SUBCUTANEOUS at 07:32

## 2022-12-30 RX ADMIN — CHOLECALCIFEROL TAB 10 MCG (400 UNIT) 400 UNITS: 10 TAB at 08:35

## 2022-12-30 RX ADMIN — PANTOPRAZOLE SODIUM 40 MG: 40 TABLET, DELAYED RELEASE ORAL at 16:50

## 2022-12-30 RX ADMIN — Medication 1 TABLET: at 08:33

## 2022-12-30 RX ADMIN — DOXYCYCLINE 100 MG: 100 CAPSULE ORAL at 21:12

## 2022-12-30 RX ADMIN — METHOCARBAMOL 500 MG: 500 TABLET ORAL at 17:52

## 2022-12-30 RX ADMIN — PANTOPRAZOLE SODIUM 40 MG: 40 TABLET, DELAYED RELEASE ORAL at 06:28

## 2022-12-30 RX ADMIN — OXYCODONE HYDROCHLORIDE 10 MG: 10 TABLET ORAL at 20:43

## 2022-12-30 RX ADMIN — INSULIN LISPRO 1 UNITS: 100 INJECTION, SOLUTION INTRAVENOUS; SUBCUTANEOUS at 16:40

## 2022-12-30 RX ADMIN — METHOCARBAMOL 500 MG: 500 TABLET ORAL at 08:33

## 2022-12-30 RX ADMIN — ACETAMINOPHEN 975 MG: 325 TABLET, FILM COATED ORAL at 14:00

## 2022-12-30 RX ADMIN — ACETAMINOPHEN 975 MG: 325 TABLET, FILM COATED ORAL at 20:45

## 2022-12-30 RX ADMIN — INSULIN LISPRO 6 UNITS: 100 INJECTION, SOLUTION INTRAVENOUS; SUBCUTANEOUS at 12:27

## 2022-12-30 RX ADMIN — ASPIRIN 81 MG CHEWABLE TABLET 81 MG: 81 TABLET CHEWABLE at 08:33

## 2022-12-30 RX ADMIN — INSULIN GLARGINE 6 UNITS: 100 INJECTION, SOLUTION SUBCUTANEOUS at 22:06

## 2022-12-30 RX ADMIN — INSULIN LISPRO 6 UNITS: 100 INJECTION, SOLUTION INTRAVENOUS; SUBCUTANEOUS at 07:33

## 2022-12-30 RX ADMIN — SIMETHICONE 80 MG: 80 TABLET, CHEWABLE ORAL at 08:34

## 2022-12-30 RX ADMIN — ONDANSETRON 4 MG: 4 TABLET, ORALLY DISINTEGRATING ORAL at 19:40

## 2022-12-30 RX ADMIN — MELATONIN TAB 3 MG 3 MG: 3 TAB at 20:46

## 2022-12-30 RX ADMIN — HEPARIN SODIUM 5000 UNITS: 5000 INJECTION INTRAVENOUS; SUBCUTANEOUS at 20:44

## 2022-12-30 RX ADMIN — INSULIN LISPRO 6 UNITS: 100 INJECTION, SOLUTION INTRAVENOUS; SUBCUTANEOUS at 16:40

## 2022-12-30 RX ADMIN — SIMETHICONE 80 MG: 80 TABLET, CHEWABLE ORAL at 20:43

## 2022-12-30 RX ADMIN — AMLODIPINE BESYLATE 5 MG: 5 TABLET ORAL at 17:52

## 2022-12-30 RX ADMIN — HEPARIN SODIUM 5000 UNITS: 5000 INJECTION INTRAVENOUS; SUBCUTANEOUS at 13:42

## 2022-12-30 RX ADMIN — ATORVASTATIN CALCIUM 40 MG: 40 TABLET, FILM COATED ORAL at 08:33

## 2022-12-30 RX ADMIN — SIMETHICONE 80 MG: 80 TABLET, CHEWABLE ORAL at 14:00

## 2022-12-30 RX ADMIN — HEPARIN SODIUM 5000 UNITS: 5000 INJECTION INTRAVENOUS; SUBCUTANEOUS at 06:28

## 2022-12-30 RX ADMIN — ACETAMINOPHEN 975 MG: 325 TABLET, FILM COATED ORAL at 06:28

## 2022-12-30 RX ADMIN — AMLODIPINE BESYLATE 5 MG: 5 TABLET ORAL at 08:33

## 2022-12-30 RX ADMIN — INSULIN LISPRO 1 UNITS: 100 INJECTION, SOLUTION INTRAVENOUS; SUBCUTANEOUS at 12:26

## 2022-12-30 RX ADMIN — COLLAGENASE SANTYL: 250 OINTMENT TOPICAL at 10:30

## 2022-12-30 RX ADMIN — INSULIN LISPRO 1 UNITS: 100 INJECTION, SOLUTION INTRAVENOUS; SUBCUTANEOUS at 22:05

## 2022-12-30 NOTE — ASSESSMENT & PLAN NOTE
1125, (previous 1025)  Hepatic mets  Follow level  ? bone involvement - no evidence of ROM limitations or pain complaints on survey    Follow up with Surg Onc

## 2022-12-30 NOTE — PROGRESS NOTES
12/30/22 1401   Assessment   Treatment Assessment Pt seen at bedside  Upon entering pt very emotional  Time spent providing pt with emotional support and encouragement  Pt reports "it feels like I'm losing myself in all of this " Education provided on stages of grief  Discussed the impact of psychological health  The goal of this education session was to provide patient with the tools to identify the presence of these signs and symptoms within him self and to have the resources to improve overall coping skills; therefore increasing his overall quality of life  This process of identifying, acknowledging and addressing any depression and anxiety feelings will also facilitate improvement in functional mobility and in the patient's rehab experience  Pt will continue to benefit from skilled OT services following POC with focus on ADL retraining, standing julia/balance, endurance, act julia, strengthening to inc safety and independence with I/ADL tasks  Recommendation   OT Discharge Recommendation Home with home health rehabilitation   OT Therapy Minutes   OT Time In 1400   OT Time Out 1415   OT Total Time (minutes) 15   OT Mode of treatment - Individual (minutes) 15   OT Mode of treatment - Concurrent (minutes) 0   OT Mode of treatment - Group (minutes) 0   OT Mode of treatment - Co-treat (minutes) 0   OT Mode of Treatment - Total time(minutes) 15 minutes   OT Cumulative Minutes 1125   Therapy Time missed   Time missed?  No

## 2022-12-30 NOTE — PROGRESS NOTES
Internal Medicine Progress Note  Patient: Leobardo Camarena  Age/sex: 61 y o  male  Medical Record #: [id number]      ASSESSMENT/PLAN: (Interval History)  Leobardo Camarena is seen and examined and management for following issues:    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  • s/p hepatic resection and reversal of colostomy on 11/7  • s/p right hemicolectomy with partial descending colectomy colocolonic anastomosis with loop ileostomy and mid line abdominal VAC placement 11/16  • Continue local wound care; WC following  • CT  Abd/pelvis 12/17 = loculated collection along splenic recess   Has perisplenic hilum collection as well --> to IR 12/20 for drain placement in both perigastric and perisplenic area and cx sent from both areas = Ecoli  • Flush drains with 10ml qd; tube check 2 weeks 1/3/23 but likely sooner since not draining much = IR was consulted  • 12/28 drainage = 15/2/10 ml and no drainage recorded 12/29  • S-O following      Acute cholecystitis  • s/p percutaneous tube placement 12/8 with tube check on 12/12, 12/14  • GI saw due to alk phos elevation = felt 2/2 infiltrative disease rather than focal biliary obstruction   AMA was negative     • Alkaline phosphatase isoenzyme sent 12/17 (48% liver/52% bone) = GI  recommended continuing to monitor his LFTs and defer to surgical oncology for further w/u and plan  • TB improving = to consider MRCP if total bili trends back up  • Alvina tube = 45 ml 12/28; no drainage recorded 12/29  Drainage is dark bloody     Bacteremia/abdominal abscess  • ID following   • s/p Ertapenem and now to switch to doxycycline per ID as of 12/30  • Blood cultures negative    • CT abdomen and pelvis as above  • Has ongoing night sweats     Hypertension  • On Norvasc 5mg BID  • stable     Diabetes mellitus  • On Lantus 12U qhs/Lispro 6U TID  • Continue DM diet and QID Accuchecks/SSI  • BS stable no changes today     Acute on chronic renal failure  • Stage III; baseline 1 2-1 4  • Lisinopril currently on hold  • s/p NSS IVF with improvement of creat =  back down to 1 2 today 12/30     Anemia  • Multifactorial due to recent infection, surgery, CKD stage III  • Transfused on 12/15 for hemoglobin 7 3 and 12/16/22 for hgb of 8   • No transfusion for now per pt request but agreeable if it drops below 7  • On 12/28 was 7 1  • Recheck today is shows hemoglobin down to 7 = to transfuse one unit PRBCs  Pt agreeable      Atypical chest pain  • With elevation in troponin/EKG changes  • Cardiology cleared pt for discharge  • Did not recommend any further work up  • No further chest pain     Situational depression  • Neuropsychology consulted  • Patient not interested in medications at this point in time     Malnutrition  • Recommend dietician consultation to optimize wound healing  • Glucerna makes him nauseated  • On 12/15, ordered double protein     Pleural effusion  • CXR on 12/14 showed small left pleural effusion and was suspect for CHF  • ECHO 11/9/22 = LVEF 55%, unable to assess diastolic function, mild TR     L sided pleuritic pain  • located near drain site and says only occurs at night he says  • No evidence of infection        Discharge date:  1/9/23    The above assessment and plan was reviewed and updated as determined by my evaluation of the patient on 12/30/2022      Labs:   Results from last 7 days   Lab Units 12/30/22  0639 12/28/22  0526   WBC Thousand/uL 10 15 11 91*   HEMOGLOBIN g/dL 7 0* 7 1*   HEMATOCRIT % 23 2* 23 8*   PLATELETS Thousands/uL 501* 476*     Results from last 7 days   Lab Units 12/30/22  0639 12/28/22  0526   SODIUM mmol/L 139 139   POTASSIUM mmol/L 4 1 3 8   CHLORIDE mmol/L 112* 112*   CO2 mmol/L 21 21   BUN mg/dL 20 21   CREATININE mg/dL 1 23 1 21   CALCIUM mg/dL 9 0 8 9             Results from last 7 days   Lab Units 12/30/22  1055 12/30/22  0652 12/29/22  2106   POC GLUCOSE mg/dl 188* 150* 187*       Review of Scheduled Meds:  Current Facility-Administered Medications   Medication Dose Route Frequency Provider Last Rate   • acetaminophen  975 mg Oral Q8H [address] GAURANG Keith     • amLODIPine  5 mg Oral BID GAURANG Keith     • aspirin  81 mg Oral Daily GAURANG Keith     • atorvastatin  40 mg Oral Daily GAURANG Keith     • calcium carbonate  500 mg Oral BID PRN GAURANG Medina     • cholecalciferol  400 Units Oral Daily GAURANG Medina     • collagenase   Topical Daily Jeana Garnett MD     • ertapenem  1,000 mg Intravenous Q24H Amy Alvarado MD Stopped (12/28/22 2100)   • heparin (porcine)  5,000 Units Subcutaneous Q8H [address] GAURANG Keith     • insulin glargine  6 Units Subcutaneous HS GAURANG Arias     • insulin lispro  1-5 Units Subcutaneous HS GAURANG Keith     • insulin lispro  1-6 Units Subcutaneous TID AC GAURANG Keith     • insulin lispro  6 Units Subcutaneous TID With Meals GAURANG Arias     • iohexol  50 mL Oral 90 min pre-procedure Amy Alvarado MD     • melatonin  3 mg Oral HS GAURANG Keith     • methocarbamol  500 mg Oral BID GAURANG Medina     • multivitamin-minerals  1 tablet Oral Daily GAURANG Keith     • ondansetron  4 mg Oral Q6H PRN GAURANG Medina     • oxyCODONE  10 mg Oral Q6H PRN GAURANG Medina     • oxyCODONE  2 5 mg Oral Q3H PRN GAURANG Medina     • oxyCODONE  5 mg Oral Q6H PRN GAURANG Keith     • pantoprazole  40 mg Oral BID AC GAURANG Keith     • simethicone  80 mg Oral 4x Daily (with meals and at bedtime) GAURANG Medina     • tamsulosin  0 4 mg Oral Daily With Dinner GAURANG Medina         Subjective/ HPI: Patient seen and examined  Patients overnight issues or events were reviewed with nursing or staff during rounds or morning huddle session  New or overnight issues include the following: For tube check today  Will need to be transfused today for hemoglobin of 7 0    Alk phos is preliminarily >1000 but final results pending    ROS:   A 10 point ROS was performed; negative except as noted above  *Labs /Radiology studies reviewed  *Medications reviewed and reconciled as needed  *Please refer to order section for additional ordered labs studies  *Case discussed with primary attending during morning huddle case rounds    Physical Examination:  Vitals:   Vitals:    12/29/22 1620 12/29/22 1854 12/29/22 2056 12/30/22 0826   BP: 133/73 126/82 144/72 118/65   BP Location: Left arm  Left arm Left arm   Pulse: 85  99 86   Resp: 18  18 20   Temp: 98 °F (36 7 °C)  98 5 °F (36 9 °C)    TempSrc: Oral  Oral    SpO2: 96%  96% 99%   Weight:       Height:           General Appearance: no distress, conversive  HEENT: PERRLA, conjuctiva normal; oropharynx clear; mucous membranes moist   Neck:  Supple, normal ROM  Lungs: CTA, normal respiratory effort, no retractions, expiratory effort normal  CV: regular rate and rhythm; no rubs/murmurs/gallops, PMI normal   ABD: soft; ND/NT; +BS  Per radha tube = dark bloody drainage  Oldest VICTORINO still with milky tan drainage; the other 2 with serous to SS drainage that is minimal  EXT: no edema  Skin: normal turgor, normal texture, no rashes  Psych: affect normal, mood normal  Neuro: AAO     The above physical exam was reviewed and updated as determined by my evaluation of the patient on 12/30/2022  Invasive Devices     Central Venous Catheter Line  Duration           Port A Cath 03/10/22 Right Chest 295 days          Drain  Duration           Ileostomy LUQ 42 days    Abscess Drain Abdomen 21 days    Cholecystostomy Tube 17 days    Abscess Drain Abdomen 9 days    Abscess Drain Back 9 days                   VTE Pharmacologic Prophylaxis: Heparin  Code Status: Level 1 - Full Code  Current Length of Stay: 16 day(s)      Total time spent:  30 minutes with more than 50% spent counseling/coordinating care   Counseling includes discussion with patient re: progress  and discussion with patient of his/her current medical state/information  Coordination of patient's care was performed in conjunction with primary service  Time invested included review of patient's labs, vitals, and management of their comorbidities with continued monitoring  In addition, this patient was discussed with medical team including physician and advanced extenders  The care of the patient was extensively discussed and appropriate treatment plan was formulated unique for this patient  Medical decision making for the day was made by supervising physician unless otherwise noted in their attestation statement  ** Please Note:  voice to text software may have been used in the creation of this document   Although proof errors in transcription or interpretation are a potential of such software**

## 2022-12-30 NOTE — PROGRESS NOTES
12/30/22 1430   Pain Assessment   Pain Assessment Tool 0-10   Pain Score 7   Pain Location/Orientation Location: Abdomen   Assessment   Treatment Assessment pt refusing skilled PT d/t  very emotional and conot crying at times   Spoke with OT about pt process and program   pt seem very  depression and anxiety at time with nsging aware  Spoke with nsging about pt session and will cont to monitor pt outcome   PT Therapy Minutes   PT Time In 1430   PT Time Out 1530   PT Total Time (minutes) 60   PT Mode of treatment - Individual (minutes) 0   PT Mode of treatment - Concurrent (minutes) 0   PT Mode of treatment - Group (minutes) 0   PT Mode of treatment - Co-treat (minutes) 0   PT Mode of Treatment - Total time(minutes) 0 minutes   PT Cumulative Minutes 960   Therapy Time missed   Time missed? Yes   Amount of time missed 60   Reason for time missed Illness; Extreme fatigue   Time(s) multiple attempts made x3

## 2022-12-30 NOTE — PROGRESS NOTES
PM&R PROGRESS NOTE:  Jaguar Mackenzie 61 y o  male MRN: [id number]  Unit/Bed#: -02 Encounter: [id number]        Rehabilitation Diagnosis: Impairment of mobility, safety, Activities of Daily Living (ADLs), and cognitive/communication skills due to Neurologic Conditions:  03 8  Neuromuscular Disorders Critical Illness Myopathy    SUBJECTIVE: Patient seen face to face  No acute issues  Feeling weak and cold  Discussed blood transfusion with patient with his agreement  Patient hopeful tube check can occur today and hopeful he can get a tube taken out or two  Abdominal discomfort about the same at night  He is progressing - did 5 stairs yesterday! ASSESSMENT: Stable, progressing      PLAN:    Rehabilitation  • Functional deficits:  Decreased endurance, strength, impaired balance, impaired mobility/self care  • Continue current rehabilitation plan of care to maximize function  • Expected Discharge:  ELOS 10-12 days  Target date 1/9/23 with home care  o Education for wife and family on tube management  o Wound care education      Currently function:   Physical Therapy Occupational Therapy Speech Therapy   Weight Bearing Status: Full Weight Bearing  Transfers: Minimal Assistance  Bed Mobility: Moderate Assistance (S OOB using features of hospital bed; assist BLE to transfer backt to bed)  Amulation Distance (ft): 50 feet  Ambulation: Moderate Assistance  Assistive Device for Ambulation: Roller Walker  Number of Stairs: 2  Assistive Device for Stairs: Bilateral Hand Rails  Stair Assistance: Total Assistance (mod Ax2; drop in BP)  Discharge Recommendations: Home with:  [address] with[de-identified] First Floor Setup, Family Support, 24 Hour Assisteance, Home Physical Therapy   Eating: Supervision  Grooming: Supervision  Bathing: Moderate Assistance  Bathing: Moderate Assistance  Upper Body Dressing:  Moderate Assistance  Lower Body Dressing: Assist of 2  Toileting: Assist of 2  Tub/Shower Transfer:  (N/A at this time, multiple drains/lines)  Toilet Transfer: Moderate Assistance  Cognition: Within Defined Limits  Orientation: Person, Place, Time, Situation                   DVT prophylaxis  • Managed on SQ Heparin    Bladder plan  • Continent    Bowel plan  • Continent ostomy output      * Malignant neoplasm of transverse colon (HCC)  Assessment & Plan  · Transverse colonic apple core lesion and innumerable hepatic metastases  · S/p Colostomy placement 2/22  · RCW port-a-cath, accessed  · S/p palliative chemo  · Follows with Dr Neymar Earl (palliative)  · Follows hem/onc (Dr Michael Mark)    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 liver [facility])  Assessment & Plan  · MRI-multiple hepatic metastasis; smaller lesions in left lobe, larger lesions in right lobe  · 11/7 Exp Lap, resection of hepatic segment 3, resection of transverse colon with reversal of loop colostomy (Dr Laurie Reese)  · 11/16- right hemicolectomy, left discontinuity and temporary abdominal closure device placed  · 11/17- re-exploration, partial descending colectomy, primary colocolonic anastomosis with diverting loop ileostomy, midline VAC placement  · CT showed abdominal abscess and distended gallbladder  · 12/8- IR percutaneous cholecystostomy tube, hepatectomy abscess drain placement  ·  IR on 12/20 additional drains placed for a perisplenic abscess as well as a splenic recess abscess  · Total of 3 drains in place  Abscess  Assessment & Plan  · Abdominal abscess- ESBL E Coli - Contact precautions  · Zosyn completed  · Completed Ertapenum course 12/28/22  · Starting Doxycycline 100 mg Q12h 12/30/22 per ID consultants      · 3 drains placed by IR  · Monitor 3 drain output:  · Abscess drain abdomen A: 0 cc   · Abscess drain back: 0-10 cc  · Abscess drain abdomen B: 15 - 20cc    TUBE CHECK ordered for IR  Patient also having some discomfort in left abdomen while supine - position check and consider removal of those not draining       Sepsis ([facility] [address] )  Assessment & Plan  · SIRS versus sepsis at this time given his vitals, leukocytosis, and complicated infection history  · Mgmt per ID, IM  · 12/20-IR perisplenic, perigastric drain placement for 2 additional abscesses-  · Perigastric abscess +E  Coli ESBL  · Blood cultures negative     Elevated alkaline phosphatase level  Assessment & Plan  >1000 - awaiting finalized result  Hepatic mets  Follow level  ?bone involvement - no evidence of ROM limitations or pain complaints on survey  Follow up with Surg Onc    Dehiscence of incision  Assessment & Plan  Improving  Wound Care following and surgical oncology following   Slough present  Santyl ordered 12/29/22    [address] as follows:   Skin Care Plan:  1-Midline Incision and Right Abdomen Wounds: Cleanse wounds with NS and pat dry  Apply Santyl to slough tissue on wound bed nickel thick  Cover with ABD and secure with minimal tape  Change daily or PRN soilage or displacement  2-Turn/reposition q2h or when medically stable for pressure re-distribution on skin   3-Elevate heels to offload pressure  4-Moisturize skin daily with skin nourishing cream  5-Ehob cushion in chair when out of bed  6-Preventative Hydraguard to bilateral heels and bilateral sacro-buttocks BID and PRN          Leukocytosis  Assessment & Plan  WBC trending down 10 15K    Acute pain  Assessment & Plan  Managed on Scheduled Tylenol 975 mg [id number]  Managed on Robaxin 500 mg BId  Oxycodone IR PRN  Moderately managed     Acute on chronic kidney failure (HCC)  Assessment & Plan  · Creatinine baseline 1 2-1 4  · Cr = 1 23  · Monitor BMP    Bacteremia  Assessment & Plan  · Remains on Abx     Iron deficiency anemia, unspecified  Assessment & Plan  · Hbg = 7 0  · Type and Screen completed  · Order placed for 1 unit PRBC today   · Recheck CBC in AM  · 12/15-1 unit PRBc  · 12/16- symptomatic- 1 unit PRBc  · Monitor CBC    Obesity (BMI 30-39  9)  Assessment & Plan  · BMI 33 0    Benign essential hypertension  Assessment & Plan  · Home: Norvasc 5 mg BID  · Here: Norvasc 5 mg BID  · Adjust medication as needed    Type 2 diabetes mellitus without complication, with long-term current use of insulin ([facility])  Assessment & Plan  · HbgA1c 8 0 (9/22)  · Home monitoring with Zebedee Loge  · Home: Lantus 12 units, Humalog 3 units with meals, Januvia  · Here: Lantus 6 units,  Humalog 6 units with meals, SSI, Lantus   · Follow with PCP (Dr Michell Nguyễn)          [address] [facility] consultants medical co-management  Labs, medications, and imaging personally reviewed  ROS:  A ten point review of systems was completed on 12/30/22 and pertinent positives are listed in subjective section  All other systems reviewed were negative  OBJECTIVE:   /76   Pulse 100   Temp 98 9 °F (37 2 °C) (Oral)   Resp 18   Ht 5' 10" (1 778 m)   Wt 104 kg (230 lb)   SpO2 99%   BMI 33 00 kg/m²     Physical Exam  Vitals and nursing note reviewed  Constitutional:       General: He is not in acute distress  HENT:      Head: Normocephalic and atraumatic  Nose: Nose normal       Mouth/Throat:      Mouth: Mucous membranes are moist    Eyes:      Conjunctiva/sclera: Conjunctivae normal    Cardiovascular:      Rate and Rhythm: Normal rate and regular rhythm  Pulses: Normal pulses  Pulmonary:      Effort: Pulmonary effort is normal       Breath sounds: Normal breath sounds  No wheezing or rales  Abdominal:      General: Bowel sounds are normal  There is no distension  Palpations: Abdomen is soft  Tenderness: There is abdominal tenderness (left side while supine)  Musculoskeletal:         General: No swelling  Cervical back: Neck supple  Skin:     General: Skin is warm  Comments: Abdominal incisional dehiscence improved with santyl    VICTORINO drains in place 3 total  (1 abdomen and 1 back is draining minimally)    Perc Alvina tube in place with brownish bilious fluid draining   Neurological:      Mental Status: He is alert and oriented to person, place, and time  Motor: Weakness (proximal musculature and core weakness) present     Psychiatric:         Mood and Affect: Mood normal                       Lab Results   Component Value Date    WBC 10 15 12/30/2022    HGB 7 0 (L) 12/30/2022    HCT 23 2 (L) 12/30/2022    MCV 89 12/30/2022     (H) 12/30/2022     Lab Results   Component Value Date    SODIUM 139 12/30/2022    K 4 1 12/30/2022     (H) 12/30/2022    CO2 21 12/30/2022    BUN 20 12/30/2022    CREATININE 1 23 12/30/2022    GLUC 149 (H) 12/30/2022    CALCIUM 9 0 12/30/2022     Lab Results   Component Value Date    INR 1 12 12/19/2022    INR 1 10 11/12/2022    INR 1 24 (H) 11/09/2022    PROTIME 14 7 (H) 12/19/2022    PROTIME 14 4 11/12/2022    PROTIME 15 8 (H) 11/09/2022           Current Facility-Administered Medications:   •  acetaminophen (TYLENOL) tablet 975 mg, 975 mg, Oral, Q8H [address], GAURANG Keith, 975 mg at 12/30/22 1400  •  amLODIPine (NORVASC) tablet 5 mg, 5 mg, Oral, BID, GAURANG Keith, 5 mg at 12/30/22 8619  •  aspirin chewable tablet 81 mg, 81 mg, Oral, Daily, GAURANG Keith, 81 mg at 12/30/22 6633  •  atorvastatin (LIPITOR) tablet 40 mg, 40 mg, Oral, Daily, GAURANG Keith, 40 mg at 12/30/22 3669  •  calcium carbonate (TUMS) chewable tablet 500 mg, 500 mg, Oral, BID PRN, GAURANG Keith, 500 mg at 12/28/22 1550  •  cholecalciferol (VITAMIN D3) tablet 400 Units, 400 Units, Oral, Daily, GAURANG Keith, 400 Units at 12/30/22 4643  •  collagenase (SANTYL) ointment, , Topical, Daily, Eduardo Cagle MD, Given at 12/30/22 1030  •  doxycycline hyclate (VIBRAMYCIN) capsule 100 mg, 100 mg, Oral, Q12H [address], Eduardo Cagle MD, 100 mg at 12/30/22 1224  •  heparin (porcine) subcutaneous injection 5,000 Units, 5,000 Units, Subcutaneous, Q8H [address], GAURANG Keith, 5,000 Units at 12/30/22 1342  •  insulin glargine (LANTUS) subcutaneous injection 6 Units 0 06 mL, 6 Units, Subcutaneous, , Reny Walker [place], [address], 6 Units at 12/29/22 2141  •  insulin lispro (HumaLOG) 100 units/mL subcutaneous injection 1-5 Units, 1-5 Units, Subcutaneous, HS, GAURANG Keith, 1 Units at 12/29/22 2142  •  insulin lispro (HumaLOG) 100 units/mL subcutaneous injection 1-6 Units, 1-6 Units, Subcutaneous, TID AC, 1 Units at 12/30/22 1226 **AND** Fingerstick Glucose (POCT), , , TID AC, GAURANG Keith  •  insulin lispro (HumaLOG) 100 units/mL subcutaneous injection 6 Units, 6 Units, Subcutaneous, TID With Meals, Shiva Ishan, GAURANG, 6 Units at 12/30/22 1227  •  iohexol (OMNIPAQUE) 240 MG/ML solution 50 mL, 50 mL, Oral, 90 min pre-procedure, Marilynn Taylor MD  •  melatonin tablet 3 mg, 3 mg, Oral, HS, GAURANG Keith, 3 mg at 12/29/22 2140  •  methocarbamol (ROBAXIN) tablet 500 mg, 500 mg, Oral, BID, GAURANG Keith, 500 mg at 12/30/22 1649  •  multivitamin-minerals (CENTRUM) tablet 1 tablet, 1 tablet, Oral, Daily, GAURANG Keith, 1 tablet at 12/30/22 5827  •  ondansetron (ZOFRAN-ODT) dispersible tablet 4 mg, 4 mg, Oral, Q6H PRN, GAURANG Keith, 4 mg at 12/29/22 2143  •  oxyCODONE (ROXICODONE) immediate release tablet 10 mg, 10 mg, Oral, Q6H PRN, GAURANG Keith, 10 mg at 12/29/22 2148  •  oxyCODONE (ROXICODONE) IR tablet 2 5 mg, 2 5 mg, Oral, Q3H PRN, GAURANG Keith, 2 5 mg at 12/25/22 1911  •  oxyCODONE (ROXICODONE) IR tablet 5 mg, 5 mg, Oral, Q6H PRN, GAURANG Keith, 5 mg at 12/26/22 2106  •  pantoprazole (PROTONIX) EC tablet 40 mg, 40 mg, Oral, BID AC, GAURANG Keith, 40 mg at 12/30/22 8642  •  simethicone (MYLICON) chewable tablet 80 mg, 80 mg, Oral, 4x Daily (with meals and at bedtime), GAURANG Keith, 80 mg at 12/30/22 1400  •  tamsulosin (FLOMAX) capsule 0 4 mg, 0 4 mg, Oral, Daily With Dinner, GAURANG Lopez, 0 4 mg at 12/29/22 1646    Past Medical History:   Diagnosis Date   • Abdominal pain 03/12/2022   • Acute renal failure ([facility] )     [id number] resolved   • [facility])    • Diabetes mellitus ([place] [address] )    • Enteritis 08/23/2016   • Gastroparesis due to DM ([facility] ) 08/23/2016   • GERD (gastroesophageal reflux disease)    • Hernia, ventral 08/04/2016   • Hyperlipidemia    • Hypertension    • Morbid obesity ([facility] ) 04/17/2018   • Postoperative visit 03/02/2022   • SIRS (systemic inflammatory response syndrome) ([place] [address] ) 03/12/2022   • Snoring    • Stage 3a chronic kidney disease ([place] [address] ) 02/19/2022       Patient Active Problem List    Diagnosis Date Noted   • Malignant neoplasm of transverse colon ([place] [address] ) 03/01/2022   • 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 liver ([place] [address] ) 03/02/2022   • Abscess 12/27/2022   • Sepsis ([place] [address] ) 12/17/2022   • Dehiscence of incision 12/30/2022   • Elevated alkaline phosphatase level 12/30/2022   • Leukocytosis 12/29/2022   • Acute pain 12/27/2022   • Severe protein-calorie malnutrition ([place] [address] ) 12/14/2022   • Acute on chronic kidney failure ([place] [address] ) 12/14/2022   • Flash pulmonary edema ([place] [address] ) 11/12/2022   • MR (mitral regurgitation) 11/12/2022   • Bacteremia 11/12/2022   • ESBL (extended spectrum beta-lactamase) producing bacteria infection 11/12/2022   • Acute respiratory failure with hypoxia ([place] [address] ) 11/12/2022   • Encephalopathy 11/09/2022   • Cervical radiculopathy 10/26/2022   • Other fatigue 06/15/2022   • Colostomy prolapse ([place] [address] ) 05/27/2022   • Colon cancer metastasized to liver ([place] [address] ) 03/12/2022   • Iron deficiency anemia, unspecified 03/01/2022   • Thrombocytosis 02/21/2022   • Hypokalemia 02/19/2022   • Transaminitis 02/19/2022   • Type 2 diabetes mellitus with hyperlipidemia ([facility]  ) 02/18/2022   • Colonic mass 02/18/2022   • Microcytic anemia 02/18/2022   • Left ureteral calculus 01/30/2020   • Incarcerated umbilical hernia 00/87/5183   • Testicular hypogonadism 06/19/2017   • Low testosterone 05/30/2017   • Type 2 diabetes mellitus without complication, with long-term current use of insulin ([facility] ) 08/23/2016   • Benign essential hypertension 08/23/2016   • Mixed hyperlipidemia 08/23/2016   • Erectile dysfunction 07/11/2016   • Obesity (BMI 30-39 9) 07/11/2016          Jeremy Anne MD    Total time spent:  35 minutes with more than 50% spent counseling/coordinating care  Counseling includes discussion with patient re: progress and discussion with patient of his/her current medical/functional state/information  Coordination of patient's care was performed in conjunction with consulting services  Time invested included review of patient's labs, vitals, and management of their comorbidities with continued monitoring  The care of the patient was extensively discussed and appropriate treatment plan was formulated unique for this patient  ** Please Note:  voice to text software may have been used in the creation of this document   Although proof errors in transcription or interpretation are a potential of such software**

## 2022-12-30 NOTE — PROGRESS NOTES
12/30/22 0830   Pain Assessment   Pain Assessment Tool 0-10   Pain Score No Pain   Restrictions/Precautions   Precautions Fall Risk;Multiple lines;Contact/isolation;Pain;Supervision on toilet/commode  (VICTORINO drain, ileostomy, IR drain)   Weight Bearing Restrictions No   ROM Restrictions No   Oral Hygiene   Type of Assistance Needed Independent   Physical Assistance Level No physical assistance   Comment seated in w/c at sink   Oral Hygiene CARE Score 6   Shower/Bathe Self   Type of Assistance Needed Physical assistance   Physical Assistance Level 25% or less   Comment A for rear hygiene only  Able to complete all other portions with inc time  Shower/Bathe Self CARE Score 3   Bathing   Assessed Bath Style Sponge Bath   Able to Gather/Transport No   Able to [employer] Temperature Yes   Able to Wash/Rinse/Dry (body part) Left Arm;Right Arm;L Upper Leg;R Upper Leg;L Lower Leg/Foot;R Lower Leg/Foot;Chest;Abdomen;Perineal Area   Limitations Noted in Balance; Endurance;Strength   Positioning Seated;Standing   Tub/Shower Transfer   Reason Not Assessed Medical   Upper Body Dressing   Type of Assistance Needed Supervision   Physical Assistance Level No physical assistance   Comment Inc time for all line mgmt and fasteners  Able to manage all portions with inc time and complete donning and doffing of pullover shirt  Upper Body Dressing CARE Score 4   Lower Body Dressing   Type of Assistance Needed Incidental touching   Physical Assistance Level No physical assistance   Comment CGA while in stance for CM up/down over hips  G carryover of use of reacher to initiate LE threading  Lower Body Dressing CARE Score 4   Putting On/Taking Off Footwear   Type of Assistance Needed Supervision; Adaptive equipment;Verbal cues   Physical Assistance Level No physical assistance   Comment Initiated Jorge Justin training for footwear mgmt  Pt receptive to all education and carries over well   Was able to don/doff socks with increased time after initial demo  Putting On/Taking Off Footwear CARE Score 4   Lying to Sitting on Side of Bed   Type of Assistance Needed Supervision   Physical Assistance Level No physical assistance   Comment HOB slightly elevated and use of rails  Lying to Sitting on Side of Bed CARE Score 4   Sit to Stand   Type of Assistance Needed Physical assistance   Physical Assistance Level 25% or less   Comment CGA/Ada with increased time pending levels of fatigue  Sit to Stand CARE Score 3   Bed-Chair Transfer   Type of Assistance Needed Physical assistance   Physical Assistance Level 25% or less   Comment fluctuates between min/CGA with RW  Chair/Bed-to-Chair Transfer CARE Score 3   Toileting Hygiene   Type of Assistance Needed Supervision   Physical Assistance Level No physical assistance   Comment Utilizing urinal while seated  Toileting Hygiene CARE Score 4   Cognition   Overall Cognitive Status WFL   Arousal/Participation Cooperative   Attention Attends with cues to redirect   Orientation Level Oriented X4   Memory Within functional limits   Following Commands Follows one step commands with increased time or repetition   Activity Tolerance   Activity Tolerance Patient limited by fatigue   Assessment   Treatment Assessment Pt participated in skilled OT services with focus on ADL retraining  Pt continues to be limited by dec act julia, dec endurance, dec strength, dec standing balance/tolerance  Pt making gradual progress towards achieving OT goals  Demos improved motivation to self initiate tasks this session  Pt will continue to benefit from skilled OT services with focus on above mentioned deficits to inc safety and independence with I/ADL tasks  Session terminated early as nurse present to reaccess cath  Will f/u in afternoon to pt's tolerance  Prognosis Fair   Problem List Decreased strength;Decreased endurance;Decreased mobility; Impaired balance;Orthopedic restrictions;Pain   Plan   Treatment/Interventions ADL retraining;Functional transfer training; Therapeutic exercise; Endurance training;Cognitive reorientation;Patient/family training;Equipment eval/education; Bed mobility; Compensatory technique education   Progress Progressing toward goals   Recommendation   OT Discharge Recommendation Home with home health rehabilitation   OT Therapy Minutes   OT Time In 0830   OT Time Out 0945   OT Total Time (minutes) 75   OT Mode of treatment - Individual (minutes) 75   OT Mode of treatment - Concurrent (minutes) 0   OT Mode of treatment - Group (minutes) 0   OT Mode of treatment - Co-treat (minutes) 0   OT Mode of Treatment - Total time(minutes) 75 minutes   OT Cumulative Minutes 1110   Therapy Time missed   Time missed?  No

## 2022-12-30 NOTE — PROGRESS NOTES
12/30/22 1230   Pain Assessment   Pain Assessment Tool 0-10   Pain Score 7   Pain Location/Orientation Location: Abdomen   Restrictions/Precautions   Precautions Fall Risk;Multiple lines;Contact/isolation;Pain;Supervision on toilet/commode  (VICTORINO drain, ileostomy, IR drain)   General   Change In Medical/Functional Status Patient's ostomy bag needed emptying/changing at the end of session  and RNing aware Mei   Subjective   Subjective pt agreeable to perform skilled PT   Roll Left and Right   Type of Assistance Needed Physical assistance   Physical Assistance Level 51%-75%   Roll Left and Right CARE Score 2   Sit to Lying   Type of Assistance Needed Set-up / clean-up   Sit to Lying CARE Score 5   Lying to Sitting on Side of Bed   Type of Assistance Needed Supervision   Lying to Sitting on Side of Bed CARE Score 4   Sit to Stand   Type of Assistance Needed Physical assistance   Physical Assistance Level 25% or less   Sit to Stand CARE Score 3   Bed-Chair Transfer   Type of Assistance Needed Physical assistance   Physical Assistance Level 25% or less   Comment using RW   Chair/Bed-to-Chair Transfer CARE Score 3   Transfer Bed/Chair/Wheelchair   Adaptive Equipment Roller Walker   Walk 10 Feet   Type of Assistance Needed Physical assistance   Physical Assistance Level 26%-50%   Comment RW   Walk 10 Feet CARE Score 3   Ambulation   Primary Mode of Locomotion Prior to Admission Walk   Distance Walked (feet) 15 ft   Assist Device Roller Walker   Does the patient walk? 2  Yes   Wheel 50 Feet with Two Turns   Type of Assistance Needed Supervision   Comment using BL UE   Wheel 50 Feet with Two Turns CARE Score 4   Wheelchair mobility   Does the patient use a wheelchair? 1  Yes   Type of Wheelchair Used 1  Manual   Method Right upper extremity; Left upper extremity   Distance Level Surface (feet) 100 ft   4 Steps   Comment (S)  BHR's on 6"  steps   Pt needs to trial RHR with B hands next session   Picking Up Object   Type of Assistance Needed Physical assistance   Physical Assistance Level 25% or less   Comment RW for support and reacher for marker   Picking Up Object CARE Score 3   Therapeutic Interventions   Strengthening seated LAQ AP marching x20 reps   Flexibility BLE heel cor stretch   Assessment   Treatment Assessment pt overall very emotional crying in session and time spent providing pt with emotional support and encouragement  Pt able to SHARE [facility] propl 100 feet 2 turns and perform some thex ex's and standing functional activitiy with reacher but needs RW support   Pt return to his room and need bag empty with nsing aware  Pt return to bed lvl with all needs in reach   Cont POC and monitor SxS to meet DC goals   Barriers to Discharge Inaccessible home environment;Decreased caregiver support   Plan   Progress Progressing toward goals   Recommendation   PT Discharge Recommendation Home with home health rehabilitation   PT Therapy Minutes   PT Time In 1230   PT Time Out 1300   PT Total Time (minutes) 30   PT Mode of treatment - Individual (minutes) 30   PT Mode of treatment - Concurrent (minutes) 0   PT Mode of treatment - Group (minutes) 0   PT Mode of treatment - Co-treat (minutes) 0   PT Mode of Treatment - Total time(minutes) 30 minutes   PT Cumulative Minutes 960   Therapy Time missed   Time missed?  No

## 2022-12-30 NOTE — NURSING NOTE
Right chest wall portacath unable to be accessed after two doses of cathflo administered  Patient due for last dose of IV antibiotic, refusing peripheral vascular access at this time  Labs scheduled for today rescheduled for the am  Sam Krause made aware with instruction to call IV team in the am, port reaccess due 12/30/22  Dr Justin Alberts also made aware of antibiotic dose being held  Patient also due for dinner time dose of 6 units standing, patient not having a great appetite, Sam Krause made aware with instruction to hold the 6 units and to give the coverage according to the patient blood sugar

## 2022-12-30 NOTE — PLAN OF CARE
Problem: Prexisting or High Potential for Compromised Skin Integrity  Goal: Skin integrity is maintained or improved  Description: INTERVENTIONS:  - Identify patients at risk for skin breakdown  - Assess and monitor skin integrity  - Assess and monitor nutrition and hydration status  - Monitor labs   - Assess for incontinence   - Turn and reposition patient  - Assist with mobility/ambulation  - Relieve pressure over bony prominences  - Avoid friction and shearing  - Provide appropriate hygiene as needed including keeping skin clean and dry  - Evaluate need for skin moisturizer/barrier cream  - Collaborate with interdisciplinary team   - Patient/family teaching  - Consider wound care consult   Outcome: Progressing     Problem: Potential for Falls  Goal: Patient will remain free of falls  Description: INTERVENTIONS:  - Educate patient/family on patient safety including physical limitations  - Instruct patient to call for assistance with activity   - Consult OT/PT to assist with strengthening/mobility   - Keep Call bell within reach  - Keep bed low and locked with side rails adjusted as appropriate  - Keep care items and personal belongings within reach  - Initiate and maintain comfort rounds  - Make Fall Risk Sign visible to staff  - Offer Toileting every  Hours, in advance of need  - Initiate/Maintain alarm  - Obtain necessary fall risk management equipment:   - Apply yellow socks and bracelet for high fall risk patients  - Consider moving patient to room near nurses station  Outcome: Progressing     Problem: PAIN - ADULT  Goal: Verbalizes/displays adequate comfort level or baseline comfort level  Description: Interventions:  - Encourage patient to monitor pain and request assistance  - Assess pain using appropriate pain scale  - Administer analgesics based on type and severity of pain and evaluate response  - Implement non-pharmacological measures as appropriate and evaluate response  - Consider cultural and social influences on pain and pain management  - Notify physician/advanced practitioner if interventions unsuccessful or patient reports new pain  Outcome: Progressing     Problem: INFECTION - ADULT  Goal: Absence or prevention of progression during hospitalization  Description: INTERVENTIONS:  - Assess and monitor for signs and symptoms of infection  - Monitor lab/diagnostic results  - Monitor all insertion sites, i e  indwelling lines, tubes, and drains  - Monitor endotracheal if appropriate and nasal secretions for changes in amount and color  - [place] appropriate cooling/warming therapies per order  - Administer medications as ordered  - Instruct and encourage patient and family to use good hand hygiene technique  - Identify and instruct in appropriate isolation precautions for identified infection/condition  Outcome: Progressing     Problem: SAFETY ADULT  Goal: Patient will remain free of falls  Description: INTERVENTIONS:  - Educate patient/family on patient safety including physical limitations  - Instruct patient to call for assistance with activity   - Consult OT/PT to assist with strengthening/mobility   - Keep Call bell within reach  - Keep bed low and locked with side rails adjusted as appropriate  - Keep care items and personal belongings within reach  - Initiate and maintain comfort rounds  - Make Fall Risk Sign visible to staff  - Offer Toileting every  Hours, in advance of need  - Initiate/Maintain alarm  - Obtain necessary fall risk management equipment:   - Apply yellow socks and bracelet for high fall risk patients  - Consider moving patient to room near nurses station  Outcome: Progressing  Goal: Maintain or return to baseline ADL function  Description: INTERVENTIONS:  -  Assess patient's ability to carry out ADLs; assess patient's baseline for ADL function and identify physical deficits which impact ability to perform ADLs (bathing, care of mouth/teeth, toileting, grooming, dressing, etc )  - Assess/evaluate cause of self-care deficits   - Assess range of motion  - Assess patient's mobility; develop plan if impaired  - Assess patient's need for assistive devices and provide as appropriate  - Encourage maximum independence but intervene and supervise when necessary  - Involve family in performance of ADLs  - Assess for home care needs following discharge   - Consider OT consult to assist with ADL evaluation and planning for discharge  - Provide patient education as appropriate  Outcome: Progressing  Goal: Maintains/Returns to pre admission functional level  Description: INTERVENTIONS:  - Perform BMAT or MOVE assessment daily    - Set and communicate daily mobility goal to care team and patient/family/caregiver  - Collaborate with rehabilitation services on mobility goals if consulted  - Perform Range of Motion  times a day  - Reposition patient every  hours    - Dangle patient  times a day  - Stand patient  times a day  - Ambulate patient  times a day  - Out of bed to chair  times a day   - Out of bed for meals  times a day  - Out of bed for toileting  - Record patient progress and toleration of activity level   Outcome: Progressing     Problem: DISCHARGE PLANNING  Goal: Discharge to home or other facility with appropriate resources  Description: INTERVENTIONS:  - Identify barriers to discharge w/patient and caregiver  - Arrange for needed discharge resources and transportation as appropriate  - Identify discharge learning needs (meds, wound care, etc )  - Arrange for interpretive services to assist at discharge as needed  - Refer to Case Management Department for coordinating discharge planning if the patient needs post-hospital services based on physician/advanced practitioner order or complex needs related to functional status, cognitive ability, or social support system  Outcome: Progressing     Problem: Nutrition/Hydration-ADULT  Goal: Nutrient/Hydration intake appropriate for improving, restoring or maintaining nutritional needs  Description: Monitor and assess patient's nutrition/hydration status for malnutrition  Collaborate with interdisciplinary team and initiate plan and interventions as ordered  Monitor patient's weight and dietary intake as ordered or per policy  Utilize nutrition screening tool and intervene as necessary  Determine patient's food preferences and provide high-protein, high-caloric foods as appropriate       INTERVENTIONS:  - Monitor oral intake, urinary output, labs, and treatment plans  - Assess nutrition and hydration status and recommend course of action  - Evaluate amount of meals eaten  - Assist patient with eating if necessary   - Allow adequate time for meals  - Recommend/ encourage appropriate diets, oral nutritional supplements, and vitamin/mineral supplements  - Order, calculate, and assess calorie counts as needed  - Recommend, monitor, and adjust tube feedings and TPN/PPN based on assessed needs  - Assess need for intravenous fluids  - Provide specific nutrition/hydration education as appropriate  - Include patient/family/caregiver in decisions related to nutrition  Outcome: Progressing     Problem: MOBILITY - ADULT  Goal: Maintain or return to baseline ADL function  Description: INTERVENTIONS:  -  Assess patient's ability to carry out ADLs; assess patient's baseline for ADL function and identify physical deficits which impact ability to perform ADLs (bathing, care of mouth/teeth, toileting, grooming, dressing, etc )  - Assess/evaluate cause of self-care deficits   - Assess range of motion  - Assess patient's mobility; develop plan if impaired  - Assess patient's need for assistive devices and provide as appropriate  - Encourage maximum independence but intervene and supervise when necessary  - Involve family in performance of ADLs  - Assess for home care needs following discharge   - Consider OT consult to assist with ADL evaluation and planning for discharge  - Provide patient education as appropriate  Outcome: Progressing  Goal: Maintains/Returns to pre admission functional level  Description: INTERVENTIONS:  - Perform BMAT or MOVE assessment daily    - Set and communicate daily mobility goal to care team and patient/family/caregiver  - Collaborate with rehabilitation services on mobility goals if consulted  - Perform Range of Motion  times a day  - Reposition patient every  hours    - Dangle patient  times a day  - Stand patient  times a day  - Ambulate patient  times a day  - Out of bed to chair  times a day   - Out of bed for meals times a day  - Out of bed for toileting  - Record patient progress and toleration of activity level   Outcome: Progressing

## 2022-12-30 NOTE — PLAN OF CARE
Problem: Prexisting or High Potential for Compromised Skin Integrity  Goal: Skin integrity is maintained or improved  Description: INTERVENTIONS:  - Identify patients at risk for skin breakdown  - Assess and monitor skin integrity  - Assess and monitor nutrition and hydration status  - Monitor labs   - Assess for incontinence   - Turn and reposition patient  - Assist with mobility/ambulation  - Relieve pressure over bony prominences  - Avoid friction and shearing  - Provide appropriate hygiene as needed including keeping skin clean and dry  - Evaluate need for skin moisturizer/barrier cream  - Collaborate with interdisciplinary team   - Patient/family teaching  - Consider wound care consult   12/30/2022 1125 by Theresa Almeida RN  Outcome: Progressing  12/30/2022 1113 by Theresa Almeida RN  Outcome: Progressing     Problem: Potential for Falls  Goal: Patient will remain free of falls  Description: INTERVENTIONS:  - Educate patient/family on patient safety including physical limitations  - Instruct patient to call for assistance with activity   - Consult OT/PT to assist with strengthening/mobility   - Keep Call bell within reach  - Keep bed low and locked with side rails adjusted as appropriate  - Keep care items and personal belongings within reach  - Initiate and maintain comfort rounds  - Make Fall Risk Sign visible to staff  - Offer Toileting every Hours, in advance of need  - Initiate/Maintain larm  - Obtain necessary fall risk management equipment:  - Apply yellow socks and bracelet for high fall risk patients  - Consider moving patient to room near nurses station  Outcome: Progressing     Problem: PAIN - ADULT  Goal: Verbalizes/displays adequate comfort level or baseline comfort level  Description: Interventions:  - Encourage patient to monitor pain and request assistance  - Assess pain using appropriate pain scale  - Administer analgesics based on type and severity of pain and evaluate response  - Implement non-pharmacological measures as appropriate and evaluate response  - Consider cultural and social influences on pain and pain management  - Notify physician/advanced practitioner if interventions unsuccessful or patient reports new pain  12/30/2022 1125 by Leanna Salazar RN  Outcome: Progressing  12/30/2022 1113 by Leanna Salazar RN  Outcome: Progressing     Problem: INFECTION - ADULT  Goal: Absence or prevention of progression during hospitalization  Description: INTERVENTIONS:  - Assess and monitor for signs and symptoms of infection  - Monitor lab/diagnostic results  - Monitor all insertion sites, i e  indwelling lines, tubes, and drains  - Monitor endotracheal if appropriate and nasal secretions for changes in amount and color  - [place] appropriate cooling/warming therapies per order  - Administer medications as ordered  - Instruct and encourage patient and family to use good hand hygiene technique  - Identify and instruct in appropriate isolation precautions for identified infection/condition  12/30/2022 1125 by Leanna Salazar RN  Outcome: Progressing  12/30/2022 1113 by Leanna Salazar RN  Outcome: Progressing     Problem: SAFETY ADULT  Goal: Patient will remain free of falls  Description: INTERVENTIONS:  - Educate patient/family on patient safety including physical limitations  - Instruct patient to call for assistance with activity   - Consult OT/PT to assist with strengthening/mobility   - Keep Call bell within reach  - Keep bed low and locked with side rails adjusted as appropriate  - Keep care items and personal belongings within reach  - Initiate and maintain comfort rounds  - Make Fall Risk Sign visible to staff  - Offer Toileting every ours, in advance of need  - Initiate/Maintainlarm  - Obtain necessary fall risk management equipment:  - Apply yellow socks and bracelet for high fall risk patients  - Consider moving patient to room near nurses station  12/30/2022 1125 by Leanna Salazar RN  Outcome: Progressing  12/30/2022 1113 by Issac Stallings RN  Outcome: Progressing  Goal: Maintain or return to baseline ADL function  Description: INTERVENTIONS:  -  Assess patient's ability to carry out ADLs; assess patient's baseline for ADL function and identify physical deficits which impact ability to perform ADLs (bathing, care of mouth/teeth, toileting, grooming, dressing, etc )  - Assess/evaluate cause of self-care deficits   - Assess range of motion  - Assess patient's mobility; develop plan if impaired  - Assess patient's need for assistive devices and provide as appropriate  - Encourage maximum independence but intervene and supervise when necessary  - Involve family in performance of ADLs  - Assess for home care needs following discharge   - Consider OT consult to assist with ADL evaluation and planning for discharge  - Provide patient education as appropriate  12/30/2022 1125 by Issac Stallings RN  Outcome: Progressing  12/30/2022 1113 by Issac Stallings RN  Outcome: Progressing  Goal: Maintains/Returns to pre admission functional level  Description: INTERVENTIONS:  - Perform BMAT or MOVE assessment daily    - Set and communicate daily mobility goal to care team and patient/family/caregiver     - Collaborate with rehabilitation services on mobility goals if consulted  - Perform Range of   - Reposition patie  - Dangle patien  - St  - Ambulate patient   - Out of bed to   -  - Out of bed for toileting  - Record patient progress and toleration of activity level   12/30/2022 1125 by Issac Stallings RN  Outcome: Progressing  12/30/2022 1113 by Issac Stallings RN  Outcome: Progressing     Problem: DISCHARGE PLANNING  Goal: Discharge to home or other facility with appropriate resources  Description: INTERVENTIONS:  - Identify barriers to discharge w/patient and caregiver  - Arrange for needed discharge resources and transportation as appropriate  - Identify discharge learning needs (meds, wound care, etc )  - Arrange for interpretive services to assist at discharge as needed  - Refer to Case Management Department for coordinating discharge planning if the patient needs post-hospital services based on physician/advanced practitioner order or complex needs related to functional status, cognitive ability, or social support system  12/30/2022 1125 by Mari Etienne RN  Outcome: Progressing  12/30/2022 1113 by Mari Etienne RN  Outcome: Progressing     Problem: Nutrition/Hydration-ADULT  Goal: Nutrient/Hydration intake appropriate for improving, restoring or maintaining nutritional needs  Description: Monitor and assess patient's nutrition/hydration status for malnutrition  Collaborate with interdisciplinary team and initiate plan and interventions as ordered  Monitor patient's weight and dietary intake as ordered or per policy  Utilize nutrition screening tool and intervene as necessary  Determine patient's food preferences and provide high-protein, high-caloric foods as appropriate       INTERVENTIONS:  - Monitor oral intake, urinary output, labs, and treatment plans  - Assess nutrition and hydration status and recommend course of action  - Evaluate amount of meals eaten  - Assist patient with eating if necessary   - Allow adequate time for meals  - Recommend/ encourage appropriate diets, oral nutritional supplements, and vitamin/mineral supplements  - Order, calculate, and assess calorie counts as needed  - Recommend, monitor, and adjust tube feedings and TPN/PPN based on assessed needs  - Assess need for intravenous fluids  - Provide specific nutrition/hydration education as appropriate  - Include patient/family/caregiver in decisions related to nutrition  12/30/2022 1125 by Mari Etienne RN  Outcome: Progressing  12/30/2022 1113 by Mari Etienne RN  Outcome: Progressing     Problem: MOBILITY - ADULT  Goal: Maintain or return to baseline ADL function  Description: INTERVENTIONS:  -  Assess patient's ability to carry out ADLs; assess patient's baseline for ADL function and identify physical deficits which impact ability to perform ADLs (bathing, care of mouth/teeth, toileting, grooming, dressing, etc )  - Assess/evaluate cause of self-care deficits   - Assess range of motion  - Assess patient's mobility; develop plan if impaired  - Assess patient's need for assistive devices and provide as appropriate  - Encourage maximum independence but intervene and supervise when necessary  - Involve family in performance of ADLs  - Assess for home care needs following discharge   - Consider OT consult to assist with ADL evaluation and planning for discharge  - Provide patient education as appropriate  2022 1125 by Loistine Apgar, RN  Outcome: Progressing  2022 1113 by Loistine Apgar, RN  Outcome: Progressing  Goal: Maintains/Returns to pre admission functional level  Description: INTERVENTIONS:  - Perform BMAT or MOVE assessment daily    - Set and communicate daily mobility goal to care team and patient/family/caregiver     - Collaborate with rehabilitation services on mobility goals if consulted  - Perform Range of Mo  - Reposition jj  - D  - Ambulate patien  - Out of bed to john  - Out of bed for mildred  - Out of bed for toileting  - Record patient progress and toleration of activity level   2022 1125 by Loistine Apgar, RN  Outcome: Progressing  2022 1113 by Loistine Apgar, RN  Outcome: Progressing

## 2022-12-30 NOTE — QUICK NOTE
Midline dressing removed at bedside with Quincy Dorantes RN  Slough present at superior region of wound, Mechanically debrided with gauze  Wound care recommending Santyl to slough covered areas  Will discuss with Dr Valentina Oviedo  No signs of infection, erythema, or active drainage  Replaced with [place] packing and reinforced with ABD and secured with paper tape  Plan:  -Continue local wound care per primary team, daily dressing changes      Obey Bullard

## 2022-12-30 NOTE — PROGRESS NOTES
Progress Note - Infectious Disease   Beth Cooley 61 y o  male MRN: [id number]  Unit/Bed#: -01 Encounter: [id number]      Impression/Recommendations:  Sepsis     Evolving 12/17:  WBC, HR   Suspect due to persistent left intra-abdominal infection in setting of complex history below, recently completed antibiotics   ROS and exam otherwise negative   Recent Flu/RSV/COVID PCR, CXR negative   Blood cultures negative   Improved with reinitiation of antibiotics, additional drain placement    Rec:  • Continue antibiotics as below  • Drains as below  • [address] [place] [address] Street  • Supportive care as per the primary service     Intra-abdominal abscess     In the setting complex surgical history as below   Initially developed 11/2022 companied by ESBL E  coli bacteremia   Status post exploratory laparotomy, right hemicolectomy, temporary abdominal closure 11/16; re-exploration, partial left colectomy, primary ileocolonic anastomosis with proximal diverting loop ileostomy, midline fascial closure on 11/17   More recently developed abscess in hepatectomy bed and collection +/- leak at area of prior colonic anastamosis, 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   Status post IR drain into perihepatic collection 12/8   Cultures with ESBL E  Coli   Status post 7 days ertapenem after drain placement through 12/15   Repeat CT 12/17 shows hepatic bed collection improved, persistent left intra-abdominal collection   Status post perigastic, perisplenic drains 12/20   Cultures again with ESBL E  coli  Rec:  • Change antibiotics to PO doxycycline to continue through weekend with plan for 28 days total  • If unable to tolerate doxycycline from a GI standpoint, change back to ertapenem  • Drains check per IR     Possible acute cholecystitis     Status post percutaneous cholecystostomy tube   Alk phos remains markedly elevated, possibly due to drain itself versus known intrahepatic metastases      Metastatic colon cancer   To liver, possible lung   Status post resection, chemotherapy, more recent hepatic resection and ablation, transverse colon resection      CKD   Baseline Cr 1 4     DM      Anemia   Status post multiple transfusions      I will reassess the patient on   Please call in the interim with new questions  Antibiotics:  Ertapenem #14    Subjective:  Patient seen on AM rounds  Doing great  Slept well  "Killing it" with PT  Good spirits  24 Hour Events:  No documented fevers, chills, sweats, nausea, vomiting, or diarrhea  Objective:  Vitals:  Temp:  [98 °F (36 7 °C)-98 5 °F (36 9 °C)] 98 5 °F (36 9 °C)  HR:  [85-99] 86  Resp:  [18-20] 20  BP: (118-144)/(65-82) 118/65  SpO2:  [96 %-99 %] 99 %  Temp (24hrs), Av 3 °F (36 8 °C), Min:98 °F (36 7 °C), Max:98 5 °F (36 9 °C)  Current: Temperature: 98 5 °F (36 9 °C)    Physical Exam:   General:  No acute distress  Psychiatric:  Awake and alert  Pulmonary:  Normal respiratory excursion without accessory muscle use  Abdomen:  Soft, nontender  Extremities:  No edema  Skin:  No rashes    Lab Results:  I have personally reviewed pertinent labs  Results from last 7 days   Lab Units 22  0639 22  0522  1522   POTASSIUM mmol/L 4 1 3 8 3 9   CHLORIDE mmol/L 112* 112* 110*   CO2 mmol/L 21 21 17*   BUN mg/dL 20 21 23   CREATININE mg/dL 1 23 1 21 1 54*   EGFR ml/min/1 73sq m 64 65 49   CALCIUM mg/dL 9 0 8 9 8 8   AST U/L 46* 46* 51*   ALT U/L 16 15 18   ALK PHOS U/L >1,000* 952* 1,072*     Results from last 7 days   Lab Units 22  0639 22  0522  1522   WBC Thousand/uL 10 15 11 91* 13 98*   HEMOGLOBIN g/dL 7 0* 7 1* 7 7*   PLATELETS Thousands/uL 501* 476* 515*           Imaging Studies:   I have personally reviewed pertinent imaging study reports and images in PACS  EKG, Pathology, and Other Studies:   I have personally reviewed pertinent reports

## 2022-12-30 NOTE — ASSESSMENT & PLAN NOTE
Improving  Wound Care following and surgical oncology following   [facility] present  Surgical oncology- no signs of infection, recommend Santyl, ABD, paper tape     [address] as follows:   Skin Care Plan:  1-Midline Incision and Right Abdomen Wounds: Cleanse wounds with NS and pat dry  Apply Santyl to slough tissue on wound bed nickel thick  Cover with ABD and secure with minimal tape  Change daily or PRN soilage or displacement  2-Turn/reposition q2h or when medically stable for pressure re-distribution on skin   3-Elevate heels to offload pressure  4-Moisturize skin daily with skin nourishing cream  5-Ehob cushion in chair when out of bed  6-Preventative Hydraguard to bilateral heels and bilateral sacro-buttocks BID and PRN

## 2022-12-30 NOTE — PLAN OF CARE
Problem: Prexisting or High Potential for Compromised Skin Integrity  Goal: Skin integrity is maintained or improved  Description: INTERVENTIONS:  - Identify patients at risk for skin breakdown  - Assess and monitor skin integrity  - Assess and monitor nutrition and hydration status  - Monitor labs   - Assess for incontinence   - Turn and reposition patient  - Assist with mobility/ambulation  - Relieve pressure over bony prominences  - Avoid friction and shearing  - Provide appropriate hygiene as needed including keeping skin clean and dry  - Evaluate need for skin moisturizer/barrier cream  - Collaborate with interdisciplinary team   - Patient/family teaching  - Consider wound care consult   Outcome: Progressing     Problem: Potential for Falls  Goal: Patient will remain free of falls  Description: INTERVENTIONS:  - Educate patient/family on patient safety including physical limitations  - Instruct patient to call for assistance with activity   - Consult OT/PT to assist with strengthening/mobility   - Keep Call bell within reach  - Keep bed low and locked with side rails adjusted as appropriate  - Keep care items and personal belongings within reach  - Initiate and maintain comfort rounds  - Make Fall Risk Sign visible to staff  - Offer Toileting every  Hours, in advance of need  - Initiate/Maintain arm  - Obtain necessary fall risk management equipment:  - Apply yellow socks and bracelet for high fall risk patients  - Consider moving patient to room near nurses station  Outcome: Progressing     Problem: PAIN - ADULT  Goal: Verbalizes/displays adequate comfort level or baseline comfort level  Description: Interventions:  - Encourage patient to monitor pain and request assistance  - Assess pain using appropriate pain scale  - Administer analgesics based on type and severity of pain and evaluate response  - Implement non-pharmacological measures as appropriate and evaluate response  - Consider cultural and social influences on pain and pain management  - Notify physician/advanced practitioner if interventions unsuccessful or patient reports new pain  Outcome: Progressing     Problem: INFECTION - ADULT  Goal: Absence or prevention of progression during hospitalization  Description: INTERVENTIONS:  - Assess and monitor for signs and symptoms of infection  - Monitor lab/diagnostic results  - Monitor all insertion sites, i e  indwelling lines, tubes, and drains  - Monitor endotracheal if appropriate and nasal secretions for changes in amount and color  - [place] appropriate cooling/warming therapies per order  - Administer medications as ordered  - Instruct and encourage patient and family to use good hand hygiene technique  - Identify and instruct in appropriate isolation precautions for identified infection/condition  Outcome: Progressing     Problem: SAFETY ADULT  Goal: Patient will remain free of falls  Description: INTERVENTIONS:  - Educate patient/family on patient safety including physical limitations  - Instruct patient to call for assistance with activity   - Consult OT/PT to assist with strengthening/mobility   - Keep Call bell within reach  - Keep bed low and locked with side rails adjusted as appropriate  - Keep care items and personal belongings within reach  - Initiate and maintain comfort rounds  - Make Fall Risk Sign visible to staff  - Offer Toileting everyHours, in advance of need  - Initiate/Maintainlarm  - Obtain necessary fall risk management equipment:  - Apply yellow socks and bracelet for high fall risk patients  - Consider moving patient to room near nurses station  Outcome: Progressing  Goal: Maintain or return to baseline ADL function  Description: INTERVENTIONS:  -  Assess patient's ability to carry out ADLs; assess patient's baseline for ADL function and identify physical deficits which impact ability to perform ADLs (bathing, care of mouth/teeth, toileting, grooming, dressing, etc )  - Assess/evaluate cause of self-care deficits   - Assess range of motion  - Assess patient's mobility; develop plan if impaired  - Assess patient's need for assistive devices and provide as appropriate  - Encourage maximum independence but intervene and supervise when necessary  - Involve family in performance of ADLs  - Assess for home care needs following discharge   - Consider OT consult to assist with ADL evaluation and planning for discharge  - Provide patient education as appropriate  Outcome: Progressing  Goal: Maintains/Returns to pre admission functional level  Description: INTERVENTIONS:  - Perform BMAT or MOVE assessment daily    - Set and communicate daily mobility goal to care team and patient/family/caregiver  - Collaborate with rehabilitation services on mobility goals if consulted  - Perform Range of Mo  - Out of bed for toileting  - Record patient progress and toleration of activity level   Outcome: Progressing     Problem: DISCHARGE PLANNING  Goal: Discharge to home or other facility with appropriate resources  Description: INTERVENTIONS:  - Identify barriers to discharge w/patient and caregiver  - Arrange for needed discharge resources and transportation as appropriate  - Identify discharge learning needs (meds, wound care, etc )  - Arrange for interpretive services to assist at discharge as needed  - Refer to Case Management Department for coordinating discharge planning if the patient needs post-hospital services based on physician/advanced practitioner order or complex needs related to functional status, cognitive ability, or social support system  Outcome: Progressing     Problem: Nutrition/Hydration-ADULT  Goal: Nutrient/Hydration intake appropriate for improving, restoring or maintaining nutritional needs  Description: Monitor and assess patient's nutrition/hydration status for malnutrition  Collaborate with interdisciplinary team and initiate plan and interventions as ordered    Monitor patient's weight and dietary intake as ordered or per policy  Utilize nutrition screening tool and intervene as necessary  Determine patient's food preferences and provide high-protein, high-caloric foods as appropriate       INTERVENTIONS:  - Monitor oral intake, urinary output, labs, and treatment plans  - Assess nutrition and hydration status and recommend course of action  - Evaluate amount of meals eaten  - Assist patient with eating if necessary   - Allow adequate time for meals  - Recommend/ encourage appropriate diets, oral nutritional supplements, and vitamin/mineral supplements  - Order, calculate, and assess calorie counts as needed  - Recommend, monitor, and adjust tube feedings and TPN/PPN based on assessed needs  - Assess need for intravenous fluids  - Provide specific nutrition/hydration education as appropriate  - Include patient/family/caregiver in decisions related to nutrition  Outcome: Progressing     Problem: MOBILITY - ADULT  Goal: Maintain or return to baseline ADL function  Description: INTERVENTIONS:  -  Assess patient's ability to carry out ADLs; assess patient's baseline for ADL function and identify physical deficits which impact ability to perform ADLs (bathing, care of mouth/teeth, toileting, grooming, dressing, etc )  - Assess/evaluate cause of self-care deficits   - Assess range of motion  - Assess patient's mobility; develop plan if impaired  - Assess patient's need for assistive devices and provide as appropriate  - Encourage maximum independence but intervene and supervise when necessary  - Involve family in performance of ADLs  - Assess for home care needs following discharge   - Consider OT consult to assist with ADL evaluation and planning for discharge  - Provide patient education as appropriate  Outcome: Progressing  Goal: Maintains/Returns to pre admission functional level  Description: INTERVENTIONS:  - Perform BMAT or MOVE assessment daily    - Set and communicate daily mobility goal to care team and patient/family/caregiver     - Collaborate with rehabilitation services on mobility goals if consulted  - Perform Ran  - Out of bed for toileting  - Record patient progress and toleration of activity level   Outcome: Progressing

## 2022-12-31 LAB
ABO GROUP BLD BPU: NORMAL
BASOPHILS # BLD AUTO: 0.06 THOUSANDS/ÂΜL (ref 0–0.1)
BASOPHILS NFR BLD AUTO: 1 % (ref 0–1)
BPU ID: NORMAL
CROSSMATCH: NORMAL
EOSINOPHIL # BLD AUTO: 0 THOUSAND/ÂΜL (ref 0–0.61)
EOSINOPHIL NFR BLD AUTO: 0 % (ref 0–6)
ERYTHROCYTE [DISTWIDTH] IN BLOOD BY AUTOMATED COUNT: 18.6 % (ref 11.6–15.1)
GLUCOSE SERPL-MCNC: 162 MG/DL (ref 65–140)
GLUCOSE SERPL-MCNC: 177 MG/DL (ref 65–140)
GLUCOSE SERPL-MCNC: 187 MG/DL (ref 65–140)
GLUCOSE SERPL-MCNC: 188 MG/DL (ref 65–140)
HCT VFR BLD AUTO: 27.5 % (ref 36.5–49.3)
HGB BLD-MCNC: 8 G/DL (ref 12–17)
IMM GRANULOCYTES # BLD AUTO: 0.26 THOUSAND/UL (ref 0–0.2)
IMM GRANULOCYTES NFR BLD AUTO: 2 % (ref 0–2)
LYMPHOCYTES # BLD AUTO: 2.02 THOUSANDS/ÂΜL (ref 0.6–4.47)
LYMPHOCYTES NFR BLD AUTO: 18 % (ref 14–44)
MCH RBC QN AUTO: 26.1 PG (ref 26.8–34.3)
MCHC RBC AUTO-ENTMCNC: 29.1 G/DL (ref 31.4–37.4)
MCV RBC AUTO: 90 FL (ref 82–98)
MONOCYTES # BLD AUTO: 1.23 THOUSAND/ÂΜL (ref 0.17–1.22)
MONOCYTES NFR BLD AUTO: 11 % (ref 4–12)
NEUTROPHILS # BLD AUTO: 7.63 THOUSANDS/ÂΜL (ref 1.85–7.62)
NEUTS SEG NFR BLD AUTO: 68 % (ref 43–75)
NRBC BLD AUTO-RTO: 0 /100 WBCS
PLATELET # BLD AUTO: 600 THOUSANDS/UL (ref 149–390)
PMV BLD AUTO: 10.2 FL (ref 8.9–12.7)
RBC # BLD AUTO: 3.07 MILLION/UL (ref 3.88–5.62)
UNIT DISPENSE STATUS: NORMAL
UNIT PRODUCT CODE: NORMAL
UNIT PRODUCT VOLUME: 350 ML
UNIT RH: NORMAL
WBC # BLD AUTO: 10.91 THOUSAND/UL (ref 4.31–10.16)

## 2022-12-31 RX ORDER — INSULIN GLARGINE 100 [IU]/ML
10 INJECTION, SOLUTION SUBCUTANEOUS
Status: DISCONTINUED | OUTPATIENT
Start: 2022-12-31 | End: 2023-01-02

## 2022-12-31 RX ADMIN — ONDANSETRON 4 MG: 4 TABLET, ORALLY DISINTEGRATING ORAL at 08:58

## 2022-12-31 RX ADMIN — INSULIN LISPRO 6 UNITS: 100 INJECTION, SOLUTION INTRAVENOUS; SUBCUTANEOUS at 17:18

## 2022-12-31 RX ADMIN — CHOLECALCIFEROL TAB 10 MCG (400 UNIT) 400 UNITS: 10 TAB at 09:42

## 2022-12-31 RX ADMIN — PANTOPRAZOLE SODIUM 40 MG: 40 TABLET, DELAYED RELEASE ORAL at 05:00

## 2022-12-31 RX ADMIN — ACETAMINOPHEN 975 MG: 325 TABLET, FILM COATED ORAL at 13:48

## 2022-12-31 RX ADMIN — AMLODIPINE BESYLATE 5 MG: 5 TABLET ORAL at 09:40

## 2022-12-31 RX ADMIN — SIMETHICONE 80 MG: 80 TABLET, CHEWABLE ORAL at 08:58

## 2022-12-31 RX ADMIN — INSULIN LISPRO 1 UNITS: 100 INJECTION, SOLUTION INTRAVENOUS; SUBCUTANEOUS at 21:42

## 2022-12-31 RX ADMIN — INSULIN GLARGINE 10 UNITS: 100 INJECTION, SOLUTION SUBCUTANEOUS at 21:42

## 2022-12-31 RX ADMIN — OXYCODONE HYDROCHLORIDE 5 MG: 5 TABLET ORAL at 09:41

## 2022-12-31 RX ADMIN — MELATONIN TAB 3 MG 3 MG: 3 TAB at 22:04

## 2022-12-31 RX ADMIN — ASPIRIN 81 MG CHEWABLE TABLET 81 MG: 81 TABLET CHEWABLE at 09:40

## 2022-12-31 RX ADMIN — Medication 1 TABLET: at 09:40

## 2022-12-31 RX ADMIN — SIMETHICONE 80 MG: 80 TABLET, CHEWABLE ORAL at 13:48

## 2022-12-31 RX ADMIN — HEPARIN SODIUM 5000 UNITS: 5000 INJECTION INTRAVENOUS; SUBCUTANEOUS at 21:41

## 2022-12-31 RX ADMIN — METHOCARBAMOL 500 MG: 500 TABLET ORAL at 18:14

## 2022-12-31 RX ADMIN — ACETAMINOPHEN 975 MG: 325 TABLET, FILM COATED ORAL at 21:41

## 2022-12-31 RX ADMIN — METHOCARBAMOL 500 MG: 500 TABLET ORAL at 09:40

## 2022-12-31 RX ADMIN — HEPARIN SODIUM 5000 UNITS: 5000 INJECTION INTRAVENOUS; SUBCUTANEOUS at 05:00

## 2022-12-31 RX ADMIN — INSULIN LISPRO 1 UNITS: 100 INJECTION, SOLUTION INTRAVENOUS; SUBCUTANEOUS at 08:48

## 2022-12-31 RX ADMIN — PANTOPRAZOLE SODIUM 40 MG: 40 TABLET, DELAYED RELEASE ORAL at 15:52

## 2022-12-31 RX ADMIN — OXYCODONE HYDROCHLORIDE 10 MG: 10 TABLET ORAL at 22:04

## 2022-12-31 RX ADMIN — ATORVASTATIN CALCIUM 40 MG: 40 TABLET, FILM COATED ORAL at 09:40

## 2022-12-31 RX ADMIN — TAMSULOSIN HYDROCHLORIDE 0.4 MG: 0.4 CAPSULE ORAL at 18:14

## 2022-12-31 RX ADMIN — SIMETHICONE 80 MG: 80 TABLET, CHEWABLE ORAL at 21:41

## 2022-12-31 RX ADMIN — HEPARIN SODIUM 5000 UNITS: 5000 INJECTION INTRAVENOUS; SUBCUTANEOUS at 13:48

## 2022-12-31 RX ADMIN — INSULIN LISPRO 6 UNITS: 100 INJECTION, SOLUTION INTRAVENOUS; SUBCUTANEOUS at 08:48

## 2022-12-31 RX ADMIN — INSULIN LISPRO 1 UNITS: 100 INJECTION, SOLUTION INTRAVENOUS; SUBCUTANEOUS at 17:17

## 2022-12-31 RX ADMIN — AMLODIPINE BESYLATE 5 MG: 5 TABLET ORAL at 18:14

## 2022-12-31 RX ADMIN — DOXYCYCLINE 100 MG: 100 CAPSULE ORAL at 09:40

## 2022-12-31 RX ADMIN — ACETAMINOPHEN 975 MG: 325 TABLET, FILM COATED ORAL at 05:00

## 2022-12-31 RX ADMIN — DOXYCYCLINE 100 MG: 100 CAPSULE ORAL at 21:41

## 2022-12-31 RX ADMIN — SIMETHICONE 80 MG: 80 TABLET, CHEWABLE ORAL at 18:14

## 2022-12-31 RX ADMIN — COLLAGENASE SANTYL: 250 OINTMENT TOPICAL at 15:51

## 2022-12-31 NOTE — PLAN OF CARE
Problem: Prexisting or High Potential for Compromised Skin Integrity  Goal: Skin integrity is maintained or improved  Description: INTERVENTIONS:  - Identify patients at risk for skin breakdown  - Assess and monitor skin integrity  - Assess and monitor nutrition and hydration status  - Monitor labs   - Assess for incontinence   - Turn and reposition patient  - Assist with mobility/ambulation  - Relieve pressure over bony prominences  - Avoid friction and shearing  - Provide appropriate hygiene as needed including keeping skin clean and dry  - Evaluate need for skin moisturizer/barrier cream  - Collaborate with interdisciplinary team   - Patient/family teaching  - Consider wound care consult   Outcome: Progressing     Problem: Potential for Falls  Goal: Patient will remain free of falls  Description: INTERVENTIONS:  - Educate patient/family on patient safety including physical limitations  - Instruct patient to call for assistance with activity   - Consult OT/PT to assist with strengthening/mobility   - Keep Call bell within reach  - Keep bed low and locked with side rails adjusted as appropriate  - Keep care items and personal belongings within reach  - Initiate and maintain comfort rounds  - Make Fall Risk Sign visible to staff  - Offer Toileting every  Hours, in advance of need  - Initiate/Maintain alarm  - Obtain necessary fall risk management equipment:   - Apply yellow socks and bracelet for high fall risk patients  - Consider moving patient to room near nurses station  Outcome: Progressing     Problem: PAIN - ADULT  Goal: Verbalizes/displays adequate comfort level or baseline comfort level  Description: Interventions:  - Encourage patient to monitor pain and request assistance  - Assess pain using appropriate pain scale  - Administer analgesics based on type and severity of pain and evaluate response  - Implement non-pharmacological measures as appropriate and evaluate response  - Consider cultural and social influences on pain and pain management  - Notify physician/advanced practitioner if interventions unsuccessful or patient reports new pain  Outcome: Progressing     Problem: INFECTION - ADULT  Goal: Absence or prevention of progression during hospitalization  Description: INTERVENTIONS:  - Assess and monitor for signs and symptoms of infection  - Monitor lab/diagnostic results  - Monitor all insertion sites, i e  indwelling lines, tubes, and drains  - Monitor endotracheal if appropriate and nasal secretions for changes in amount and color  - [place] appropriate cooling/warming therapies per order  - Administer medications as ordered  - Instruct and encourage patient and family to use good hand hygiene technique  - Identify and instruct in appropriate isolation precautions for identified infection/condition  Outcome: Progressing     Problem: SAFETY ADULT  Goal: Patient will remain free of falls  Description: INTERVENTIONS:  - Educate patient/family on patient safety including physical limitations  - Instruct patient to call for assistance with activity   - Consult OT/PT to assist with strengthening/mobility   - Keep Call bell within reach  - Keep bed low and locked with side rails adjusted as appropriate  - Keep care items and personal belongings within reach  - Initiate and maintain comfort rounds  - Make Fall Risk Sign visible to staff  - Offer Toileting every  Hours, in advance of need  - Initiate/Maintainalarm  - Obtain necessary fall risk management equipment:   - Apply yellow socks and bracelet for high fall risk patients  - Consider moving patient to room near nurses station  Outcome: Progressing  Goal: Maintain or return to baseline ADL function  Description: INTERVENTIONS:  -  Assess patient's ability to carry out ADLs; assess patient's baseline for ADL function and identify physical deficits which impact ability to perform ADLs (bathing, care of mouth/teeth, toileting, grooming, dressing, etc )  - Assess/evaluate cause of self-care deficits   - Assess range of motion  - Assess patient's mobility; develop plan if impaired  - Assess patient's need for assistive devices and provide as appropriate  - Encourage maximum independence but intervene and supervise when necessary  - Involve family in performance of ADLs  - Assess for home care needs following discharge   - Consider OT consult to assist with ADL evaluation and planning for discharge  - Provide patient education as appropriate  Outcome: Progressing  Goal: Maintains/Returns to pre admission functional level  Description: INTERVENTIONS:  - Perform BMAT or MOVE assessment daily    - Set and communicate daily mobility goal to care team and patient/family/caregiver  - Collaborate with rehabilitation services on mobility goals if consulted  - Perform Range of Motion  times a day  - Reposition patient every  hours    - Dangle patient  times a day  - Stand patient  times a day  - Ambulate patient  times a day  - Out of bed to chair  times a day   - Out of bed for meals  times a day  - Out of bed for toileting  - Record patient progress and toleration of activity level   Outcome: Progressing     Problem: DISCHARGE PLANNING  Goal: Discharge to home or other facility with appropriate resources  Description: INTERVENTIONS:  - Identify barriers to discharge w/patient and caregiver  - Arrange for needed discharge resources and transportation as appropriate  - Identify discharge learning needs (meds, wound care, etc )  - Arrange for interpretive services to assist at discharge as needed  - Refer to Case Management Department for coordinating discharge planning if the patient needs post-hospital services based on physician/advanced practitioner order or complex needs related to functional status, cognitive ability, or social support system  Outcome: Progressing     Problem: Nutrition/Hydration-ADULT  Goal: Nutrient/Hydration intake appropriate for improving, restoring or maintaining nutritional needs  Description: Monitor and assess patient's nutrition/hydration status for malnutrition  Collaborate with interdisciplinary team and initiate plan and interventions as ordered  Monitor patient's weight and dietary intake as ordered or per policy  Utilize nutrition screening tool and intervene as necessary  Determine patient's food preferences and provide high-protein, high-caloric foods as appropriate       INTERVENTIONS:  - Monitor oral intake, urinary output, labs, and treatment plans  - Assess nutrition and hydration status and recommend course of action  - Evaluate amount of meals eaten  - Assist patient with eating if necessary   - Allow adequate time for meals  - Recommend/ encourage appropriate diets, oral nutritional supplements, and vitamin/mineral supplements  - Order, calculate, and assess calorie counts as needed  - Recommend, monitor, and adjust tube feedings and TPN/PPN based on assessed needs  - Assess need for intravenous fluids  - Provide specific nutrition/hydration education as appropriate  - Include patient/family/caregiver in decisions related to nutrition  Outcome: Progressing     Problem: MOBILITY - ADULT  Goal: Maintain or return to baseline ADL function  Description: INTERVENTIONS:  -  Assess patient's ability to carry out ADLs; assess patient's baseline for ADL function and identify physical deficits which impact ability to perform ADLs (bathing, care of mouth/teeth, toileting, grooming, dressing, etc )  - Assess/evaluate cause of self-care deficits   - Assess range of motion  - Assess patient's mobility; develop plan if impaired  - Assess patient's need for assistive devices and provide as appropriate  - Encourage maximum independence but intervene and supervise when necessary  - Involve family in performance of ADLs  - Assess for home care needs following discharge   - Consider OT consult to assist with ADL evaluation and planning for discharge  - Provide patient education as appropriate  Outcome: Progressing  Goal: Maintains/Returns to pre admission functional level  Description: INTERVENTIONS:  - Perform BMAT or MOVE assessment daily    - Set and communicate daily mobility goal to care team and patient/family/caregiver  - Collaborate with rehabilitation services on mobility goals if consulted  - Perform Range of Motion  times a day  - Reposition patient every hours    - Dangle patient  times a day  - Stand patient  times a day  - Ambulate patient  times a day  - Out of bed to chair  times a day   - Out of bed for meals  times a day  - Out of bed for toileting  - Record patient progress and toleration of activity level   Outcome: Progressing

## 2022-12-31 NOTE — PROGRESS NOTES
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  • s/p hepatic resection and reversal of colostomy on 11/7  • s/p right hemicolectomy with partial descending colectomy colocolonic anastomosis with loop ileostomy and mid line abdominal VAC placement 11/16  • Continue local wound care; WC following  • CT  Abd/pelvis 12/17 = loculated collection along splenic recess   Has perisplenic hilum collection as well --> to IR 12/20 for drain placement in both perigastric and perisplenic area and cx sent from both areas = Ecoli  • Flush drains with 10ml qd; tube check was to try to do 12/30 but per IR, hemoglobin was too low so now on for 1/3 AM  • 12/30 VICTORINO drainage = 10/5/5 ml   • S-O following      Acute cholecystitis  • s/p percutaneous tube placement 12/8 with tube check on 12/12, 12/14  • GI saw due to alk phos elevation = felt 2/2 infiltrative disease rather than focal biliary obstruction   AMA was negative     • Alkaline phosphatase isoenzyme sent 12/17 (48% liver/52% bone) = GI  recommended continuing to monitor his LFTs and defer to surgical oncology for further w/u and plan  Last 3 APs were [phone]  Will recheck 1/3  • TB improving = to consider MRCP if total bili trends back up but was normal 12/30  • Alvina tube = 10 ml 12/30  Drainage is dark bloody as always     Bacteremia/abdominal abscess  • ID following   • s/p Ertapenem and now to switch to doxycycline per ID as of 12/30  • Blood cultures negative    • CT abdomen and pelvis as above  • Has ongoing night sweats     Hypertension  • On Norvasc 5mg BID  • stable     Diabetes mellitus  • On Lantus 12U qhs/Lispro 6U TID  • Continue DM diet and QID Accuchecks/SSI  • Increase Lantus to 10U qhs to start 12/31/22     Acute on chronic renal failure  • Stage III; baseline 1 2-1 4  • Lisinopril currently on hold  • s/p NSS IVF with improvement of creat =  back down to 1 2 12/30     Anemia  • Multifactorial due to recent infection, surgery, CKD stage III  • Transfused on 12/15 for hemoglobin 7 3 and 12/16/22 for hgb of 8   • No transfusion for now per pt request but agreeable if it drops below 7  • On 12/28 was 7 1  • Hemoglobin down to 7 on 12/30 and transfuse one unit PRBCs  • Today hemoglobin is 8  • Will recheck CBC 1/2/23     Atypical chest pain  • With elevation in troponin/EKG changes  • Cardiology cleared pt for discharge  • Did not recommend any further work up  • No further chest pain     Situational depression  • Neuropsychology consulted  • Patient not interested in medications at this point in time     Malnutrition  • Recommend dietician consultation to optimize wound healing  • Glucerna makes him nauseated  • On 12/15, ordered double protein     Pleural effusion  • CXR on 12/14 showed small left pleural effusion and was suspect for CHF  • ECHO 11/9/22 = LVEF 55%, unable to assess diastolic function, mild TR     L sided pleuritic pain  • located near drain site and says only occurs at night he says  • No evidence of infection        Discharge date:  1/9/23    The above assessment and plan was reviewed and updated as determined by my evaluation of the patient on 12/31/2022      Labs:   Results from last 7 days   Lab Units 12/31/22  0458 12/30/22  0639   WBC Thousand/uL 10 91* 10 15   HEMOGLOBIN g/dL 8 0* 7 0*   HEMATOCRIT % 27 5* 23 2*   PLATELETS Thousands/uL 600* 501*     Results from last 7 days   Lab Units 12/30/22  0639 12/28/22  0526   SODIUM mmol/L 139 139   POTASSIUM mmol/L 4 1 3 8   CHLORIDE mmol/L 112* 112*   CO2 mmol/L 21 21   BUN mg/dL 20 21   CREATININE mg/dL 1 23 1 21   CALCIUM mg/dL 9 0 8 9             Results from last 7 days   Lab Units 12/31/22  1042 12/31/22  0600 12/30/22  2031   POC GLUCOSE mg/dl 188* 177* 158*       Review of Scheduled Meds:  Current Facility-Administered Medications   Medication Dose Route Frequency Provider Last Rate   • acetaminophen  975 mg Oral Q8H [facility] GAURANG Keith     • amLODIPine  5 mg Oral BID GAURANG Keith     • aspirin  81 mg Oral Daily GAURANG Keith     • atorvastatin  40 mg Oral Daily GAURANG Keith     • calcium carbonate  500 mg Oral BID PRN GAURANG Whaley     • cholecalciferol  400 Units Oral Daily GAURANG Whaley     • collagenase   Topical Daily Sundar Bobo MD     • doxycycline hyclate  100 mg Oral Q12H [facility] [place], MD     • heparin (porcine)  5,000 Units Subcutaneous Q8H [facility] GAURANG Keith     • insulin glargine  6 Units Subcutaneous HS GAURANG Brooke     • insulin lispro  1-5 Units Subcutaneous HS GAURANG Keith     • insulin lispro  1-6 Units Subcutaneous TID  GAURANG Keith     • insulin lispro  6 Units Subcutaneous TID With Meals GAURANG Brooke     • iohexol  50 mL Oral 90 min pre-procedure Jesenia Borrego MD     • melatonin  3 mg Oral HS GAURANG Keith     • methocarbamol  500 mg Oral BID GAURANG Whaley     • multivitamin-minerals  1 tablet Oral Daily GAURANG Keith     • ondansetron  4 mg Oral Q6H PRN GAURANG Whaley     • oxyCODONE  10 mg Oral Q6H PRN GAURANG Whaley     • oxyCODONE  2 5 mg Oral Q3H PRN GAURANG Whaley     • oxyCODONE  5 mg Oral Q6H PRN GAURANG Keith     • pantoprazole  40 mg Oral BID  GAURANG Keith     • simethicone  80 mg Oral 4x Daily (with meals and at bedtime) GAURANG Whaley     • tamsulosin  0 4 mg Oral Daily With Dinner GAURANG Whaley         Subjective/ HPI: Patient seen and examined  Patients overnight issues or events were reviewed with nursing or staff during rounds or morning huddle session  New or overnight issues include the following:     No new or overnight issues    IR unable to do tube check 12/30 since hemoglobin was low = now scheduled for 1/2 in AM    ROS:   A 10 point ROS was performed; negative except as noted above  *Labs /Radiology studies reviewed  *Medications reviewed and reconciled as needed  *Please refer to order section for additional ordered labs studies  *Case discussed with primary attending during morning huddle case rounds    Physical Examination:  Vitals:   Vitals:    12/30/22 1912 12/30/22 2047 12/31/22 0504 12/31/22 0925   BP: 146/78 152/77 160/84 129/83   BP Location:  Left arm  Right arm   Pulse: 100 94 90 87   Resp: 16 20 18    Temp: 98 2 °F (36 8 °C) 98 9 °F (37 2 °C) 99 4 °F (37 4 °C)    TempSrc:  Oral Oral    SpO2:  99% 97%    Weight:       Height:           General Appearance: no distress, conversive  HEENT: PERRLA, conjuctiva normal; oropharynx clear; mucous membranes moist   Neck:  Supple, normal ROM  Lungs: BBS without crackles/wheeze/rhonchi; respirations unlabored with normal inspiratory/expiratory effort  No retractions noted  On RA  CV: regular rate and rhythm; no rubs/murmurs/gallops, PMI normal   ABD: Abdomen is soft  Bowel sounds all quadrants  Nontender except over midline incision; no distention  3 VICTORINO drains in place  Per radha tube with dark bloody drainage as before  EXT: no edema  Skin: normal turgor, normal texture, no rashes  Psych: affect normal, mood normal  Neuro: AAO      The above physical exam was reviewed and updated as determined by my evaluation of the patient on 12/31/2022      Invasive Devices     Central Venous Catheter Line  Duration           Port A Cath 03/10/22 Right Chest 296 days          Drain  Duration           Ileostomy LUQ 43 days    Abscess Drain Abdomen 22 days    Cholecystostomy Tube 18 days    Abscess Drain Abdomen 10 days    Abscess Drain Back 10 days                   VTE Pharmacologic Prophylaxis: Heparin  Code Status: Level 1 - Full Code  Current Length of Stay: 17 day(s)      Total time spent:  30 minutes with more than 50% spent counseling/coordinating care  Counseling includes discussion with patient re: progress  and discussion with patient of his/her current medical state/information  Coordination of patient's care was performed in conjunction with primary service  Time invested included review of patient's labs, vitals, and management of their comorbidities with continued monitoring  In addition, this patient was discussed with medical team including physician and advanced extenders  The care of the patient was extensively discussed and appropriate treatment plan was formulated unique for this patient  Medical decision making for the day was made by supervising physician unless otherwise noted in their attestation statement  ** Please Note:  voice to text software may have been used in the creation of this document   Although proof errors in transcription or interpretation are a potential of such software**

## 2022-12-31 NOTE — PROGRESS NOTES
12/31/22 7095   Pain Assessment   Pain Assessment Tool 0-10   Pain Score 5   Pain Location/Orientation Location: Generalized   Restrictions/Precautions   Precautions Fall Risk;Pain;Contact/isolation;Multiple lines;Supervision on toilet/commode  (VICTORINO drain, ileostomy, IR drain)   Subjective   Subjective pt agreeable to perform skilled PT and pt much better today then yesterday   Lying to Sitting on Side of Bed   Type of Assistance Needed Supervision   Comment HOB slightly elevated and use of rails   Lying to Sitting on Side of Bed CARE Score 4   Sit to Stand   Type of Assistance Needed Incidental touching   Comment RW   Sit to Stand CARE Score 4   Bed-Chair Transfer   Type of Assistance Needed Incidental touching   Comment RW   Chair/Bed-to-Chair Transfer CARE Score 4   Transfer Bed/Chair/Wheelchair   Adaptive Equipment Roller Walker   Walk 10 Feet   Type of Assistance Needed Physical assistance   Physical Assistance Level 25% or less   Comment RW   Walk 10 Feet CARE Score 3   Walk 150 Feet   Comment limited by fatigue   Walking 10 Feet on Uneven Surfaces   Reason if not Attempted Safety concerns   Walking 10 Feet on Uneven Surfaces CARE Score 88   Ambulation   Primary Mode of Locomotion Prior to Admission Walk   Distance Walked (feet) 15 ft   Assist Device Roller Walker   Does the patient walk? 2  Yes   Wheel 50 Feet with Two Turns   Type of Assistance Needed Supervision   Wheel 50 Feet with Two Turns CARE Score 4   Wheelchair mobility   Does the patient use a wheelchair? 1  Yes   Type of Wheelchair Used 1  Manual   Method Right upper extremity; Left upper extremity   Distance Level Surface (feet) 120 ft   Therapeutic Interventions   Strengthening seated LAQ AP marching x10 -15   Flexibility HS manual   Balance standing static   Other Comments   Comments see OT note   Assessment   Treatment Assessment pt engaged in skilled PT focus on OOB STS/SPT with RW and walking 15 feet with RW Caleb to [address] [facility] [place] if inc fatigue    Pt will cont to need skilled PT and inc UE strengthening and core strengtheninig like abdominal bracing   pt also perform 1 # dowel sitting on mat with ball toss, pt overall progressing a little better then yesterday session   Pt will cont toward DC goals and pt back in recliner with all needs in reach   Barriers to Discharge Inaccessible home environment;Decreased caregiver support   Plan   Progress Progressing toward goals   Recommendation   PT Discharge Recommendation Home with home health rehabilitation   PT Therapy Minutes   PT Time In 0930   PT Time Out 1010   PT Total Time (minutes) 40   PT Mode of treatment - Individual (minutes) 40   PT Mode of treatment - Concurrent (minutes) 0   PT Mode of treatment - Group (minutes) 0   PT Mode of treatment - Co-treat (minutes) 0   PT Mode of Treatment - Total time(minutes) 40 minutes   PT Cumulative Minutes 1000   Therapy Time missed   Time missed?  No

## 2022-12-31 NOTE — PLAN OF CARE
Reviewed    Problem: Prexisting or High Potential for Compromised Skin Integrity  Goal: Skin integrity is maintained or improved  Description: INTERVENTIONS:  - Identify patients at risk for skin breakdown  - Assess and monitor skin integrity  - Assess and monitor nutrition and hydration status  - Monitor labs   - Assess for incontinence   - Turn and reposition patient  - Assist with mobility/ambulation  - Relieve pressure over bony prominences  - Avoid friction and shearing  - Provide appropriate hygiene as needed including keeping skin clean and dry  - Evaluate need for skin moisturizer/barrier cream  - Collaborate with interdisciplinary team   - Patient/family teaching  - Consider wound care consult   Outcome: Progressing     Problem: Potential for Falls  Goal: Patient will remain free of falls  Description: INTERVENTIONS:  - Educate patient/family on patient safety including physical limitations  - Instruct patient to call for assistance with activity   - Consult OT/PT to assist with strengthening/mobility   - Keep Call bell within reach  - Keep bed low and locked with side rails adjusted as appropriate  - Keep care items and personal belongings within reach  - Initiate and maintain comfort rounds  - Make Fall Risk Sign visible to staff  - Offer Toileting every 4 Hours, in advance of need  - Apply yellow socks and bracelet for high fall risk patients  - Consider moving patient to room near nurses station  Outcome: Progressing     Problem: PAIN - ADULT  Goal: Verbalizes/displays adequate comfort level or baseline comfort level  Description: Interventions:  - Encourage patient to monitor pain and request assistance  - Assess pain using appropriate pain scale  - Administer analgesics based on type and severity of pain and evaluate response  - Implement non-pharmacological measures as appropriate and evaluate response  - Consider cultural and social influences on pain and pain management  - Notify physician/advanced practitioner if interventions unsuccessful or patient reports new pain  Outcome: Progressing     Problem: INFECTION - ADULT  Goal: Absence or prevention of progression during hospitalization  Description: INTERVENTIONS:  - Assess and monitor for signs and symptoms of infection  - Monitor lab/diagnostic results  - Monitor all insertion sites, i e  indwelling lines, tubes, and drains  - Monitor endotracheal if appropriate and nasal secretions for changes in amount and color  - [place] appropriate cooling/warming therapies per order  - Administer medications as ordered  - Instruct and encourage patient and family to use good hand hygiene technique  - Identify and instruct in appropriate isolation precautions for identified infection/condition  Outcome: Progressing     Problem: SAFETY ADULT  Goal: Patient will remain free of falls  Description: INTERVENTIONS:  - Educate patient/family on patient safety including physical limitations  - Instruct patient to call for assistance with activity   - Consult OT/PT to assist with strengthening/mobility   - Keep Call bell within reach  - Keep bed low and locked with side rails adjusted as appropriate  - Keep care items and personal belongings within reach  - Initiate and maintain comfort rounds  - Make Fall Risk Sign visible to staff  - Offer Toileting every 4 Hours, in advance of need  - Apply yellow socks and bracelet for high fall risk patients  - Consider moving patient to room near nurses station  Outcome: Progressing  Goal: Maintain or return to baseline ADL function  Description: INTERVENTIONS:  -  Assess patient's ability to carry out ADLs; assess patient's baseline for ADL function and identify physical deficits which impact ability to perform ADLs (bathing, care of mouth/teeth, toileting, grooming, dressing, etc )  - Assess/evaluate cause of self-care deficits   - Assess range of motion  - Assess patient's mobility; develop plan if impaired  - Assess patient's need for assistive devices and provide as appropriate  - Encourage maximum independence but intervene and supervise when necessary  - Involve family in performance of ADLs  - Assess for home care needs following discharge   - Consider OT consult to assist with ADL evaluation and planning for discharge  - Provide patient education as appropriate  Outcome: Progressing  Goal: Maintains/Returns to pre admission functional level  Description: INTERVENTIONS:  - Set and communicate daily mobility goal to care team and patient/family/caregiver  - Collaborate with rehabilitation services on mobility goals if consulted  - Out of bed for toileting  - Record patient progress and toleration of activity level   Outcome: Progressing     Problem: DISCHARGE PLANNING  Goal: Discharge to home or other facility with appropriate resources  Description: INTERVENTIONS:  - Identify barriers to discharge w/patient and caregiver  - Arrange for needed discharge resources and transportation as appropriate  - Identify discharge learning needs (meds, wound care, etc )  - Arrange for interpretive services to assist at discharge as needed  - Refer to Case Management Department for coordinating discharge planning if the patient needs post-hospital services based on physician/advanced practitioner order or complex needs related to functional status, cognitive ability, or social support system  Outcome: Progressing     Problem: Nutrition/Hydration-ADULT  Goal: Nutrient/Hydration intake appropriate for improving, restoring or maintaining nutritional needs  Description: Monitor and assess patient's nutrition/hydration status for malnutrition  Collaborate with interdisciplinary team and initiate plan and interventions as ordered  Monitor patient's weight and dietary intake as ordered or per policy  Utilize nutrition screening tool and intervene as necessary  Determine patient's food preferences and provide high-protein, high-caloric foods as appropriate  INTERVENTIONS:  - Monitor oral intake, urinary output, labs, and treatment plans  - Assess nutrition and hydration status and recommend course of action  - Evaluate amount of meals eaten  - Assist patient with eating if necessary   - Allow adequate time for meals  - Recommend/ encourage appropriate diets, oral nutritional supplements, and vitamin/mineral supplements  - Order, calculate, and assess calorie counts as needed  - Recommend, monitor, and adjust tube feedings and TPN/PPN based on assessed needs  - Assess need for intravenous fluids  - Provide specific nutrition/hydration education as appropriate  - Include patient/family/caregiver in decisions related to nutrition  Outcome: Progressing     Problem: MOBILITY - ADULT  Goal: Maintain or return to baseline ADL function  Description: INTERVENTIONS:  -  Assess patient's ability to carry out ADLs; assess patient's baseline for ADL function and identify physical deficits which impact ability to perform ADLs (bathing, care of mouth/teeth, toileting, grooming, dressing, etc )  - Assess/evaluate cause of self-care deficits   - Assess range of motion  - Assess patient's mobility; develop plan if impaired  - Assess patient's need for assistive devices and provide as appropriate  - Encourage maximum independence but intervene and supervise when necessary  - Involve family in performance of ADLs  - Assess for home care needs following discharge   - Consider OT consult to assist with ADL evaluation and planning for discharge  - Provide patient education as appropriate  Outcome: Progressing  Goal: Maintains/Returns to pre admission functional level  Description: INTERVENTIONS:  - Set and communicate daily mobility goal to care team and patient/family/caregiver     - Collaborate with rehabilitation services on mobility goals if consulted  - Out of bed for toileting  - Record patient progress and toleration of activity level   Outcome: Progressing

## 2023-01-01 LAB
GLUCOSE SERPL-MCNC: 116 MG/DL (ref 65–140)
GLUCOSE SERPL-MCNC: 129 MG/DL (ref 65–140)
GLUCOSE SERPL-MCNC: 139 MG/DL (ref 65–140)
GLUCOSE SERPL-MCNC: 210 MG/DL (ref 65–140)

## 2023-01-01 RX ADMIN — PANTOPRAZOLE SODIUM 40 MG: 40 TABLET, DELAYED RELEASE ORAL at 16:18

## 2023-01-01 RX ADMIN — Medication 1 TABLET: at 08:43

## 2023-01-01 RX ADMIN — CALCIUM CARBONATE (ANTACID) CHEW TAB 500 MG 500 MG: 500 CHEW TAB at 18:18

## 2023-01-01 RX ADMIN — SIMETHICONE 80 MG: 80 TABLET, CHEWABLE ORAL at 17:39

## 2023-01-01 RX ADMIN — AMLODIPINE BESYLATE 5 MG: 5 TABLET ORAL at 08:43

## 2023-01-01 RX ADMIN — ATORVASTATIN CALCIUM 40 MG: 40 TABLET, FILM COATED ORAL at 08:43

## 2023-01-01 RX ADMIN — ACETAMINOPHEN 975 MG: 325 TABLET, FILM COATED ORAL at 06:37

## 2023-01-01 RX ADMIN — INSULIN GLARGINE 10 UNITS: 100 INJECTION, SOLUTION SUBCUTANEOUS at 21:57

## 2023-01-01 RX ADMIN — METHOCARBAMOL 500 MG: 500 TABLET ORAL at 08:43

## 2023-01-01 RX ADMIN — DOXYCYCLINE 100 MG: 100 CAPSULE ORAL at 08:42

## 2023-01-01 RX ADMIN — ACETAMINOPHEN 975 MG: 325 TABLET, FILM COATED ORAL at 13:03

## 2023-01-01 RX ADMIN — ACETAMINOPHEN 975 MG: 325 TABLET, FILM COATED ORAL at 21:56

## 2023-01-01 RX ADMIN — ONDANSETRON 4 MG: 4 TABLET, ORALLY DISINTEGRATING ORAL at 18:21

## 2023-01-01 RX ADMIN — CHOLECALCIFEROL TAB 10 MCG (400 UNIT) 400 UNITS: 10 TAB at 08:43

## 2023-01-01 RX ADMIN — AMLODIPINE BESYLATE 5 MG: 5 TABLET ORAL at 17:39

## 2023-01-01 RX ADMIN — SIMETHICONE 80 MG: 80 TABLET, CHEWABLE ORAL at 12:12

## 2023-01-01 RX ADMIN — HEPARIN SODIUM 5000 UNITS: 5000 INJECTION INTRAVENOUS; SUBCUTANEOUS at 13:02

## 2023-01-01 RX ADMIN — INSULIN LISPRO 6 UNITS: 100 INJECTION, SOLUTION INTRAVENOUS; SUBCUTANEOUS at 08:44

## 2023-01-01 RX ADMIN — TAMSULOSIN HYDROCHLORIDE 0.4 MG: 0.4 CAPSULE ORAL at 16:18

## 2023-01-01 RX ADMIN — PANTOPRAZOLE SODIUM 40 MG: 40 TABLET, DELAYED RELEASE ORAL at 06:37

## 2023-01-01 RX ADMIN — METHOCARBAMOL 500 MG: 500 TABLET ORAL at 17:39

## 2023-01-01 RX ADMIN — HEPARIN SODIUM 5000 UNITS: 5000 INJECTION INTRAVENOUS; SUBCUTANEOUS at 06:37

## 2023-01-01 RX ADMIN — ONDANSETRON 4 MG: 4 TABLET, ORALLY DISINTEGRATING ORAL at 08:49

## 2023-01-01 RX ADMIN — SIMETHICONE 80 MG: 80 TABLET, CHEWABLE ORAL at 08:43

## 2023-01-01 RX ADMIN — INSULIN LISPRO 6 UNITS: 100 INJECTION, SOLUTION INTRAVENOUS; SUBCUTANEOUS at 17:40

## 2023-01-01 RX ADMIN — INSULIN LISPRO 2 UNITS: 100 INJECTION, SOLUTION INTRAVENOUS; SUBCUTANEOUS at 13:03

## 2023-01-01 RX ADMIN — ASPIRIN 81 MG CHEWABLE TABLET 81 MG: 81 TABLET CHEWABLE at 08:42

## 2023-01-01 RX ADMIN — MELATONIN TAB 3 MG 3 MG: 3 TAB at 20:28

## 2023-01-01 RX ADMIN — COLLAGENASE SANTYL: 250 OINTMENT TOPICAL at 08:46

## 2023-01-01 RX ADMIN — DOXYCYCLINE 100 MG: 100 CAPSULE ORAL at 20:28

## 2023-01-01 RX ADMIN — SIMETHICONE 80 MG: 80 TABLET, CHEWABLE ORAL at 21:57

## 2023-01-01 RX ADMIN — OXYCODONE HYDROCHLORIDE 10 MG: 10 TABLET ORAL at 20:32

## 2023-01-01 RX ADMIN — HEPARIN SODIUM 5000 UNITS: 5000 INJECTION INTRAVENOUS; SUBCUTANEOUS at 21:58

## 2023-01-01 NOTE — PROGRESS NOTES
01/01/23 1000   Pain Assessment   Pain Assessment Tool 0-10   Pain Score No Pain   Restrictions/Precautions   Precautions Fall Risk;Contact/isolation;Pain;Multiple lines;Supervision on toilet/commode  ([place] drain, ileostomy, IR Drain)   Weight Bearing Restrictions No   ROM Restrictions No   Cognition   Overall Cognitive Status WFL   Arousal/Participation Cooperative   Attention Attends with cues to redirect   Orientation Level Oriented X4   Memory Within functional limits   Following Commands Follows one step commands with increased time or repetition   Sit to Lying   Type of Assistance Needed Supervision   Sit to Lying CARE Score 4   Lying to Sitting on Side of Bed   Type of Assistance Needed Supervision   Lying to Sitting on Side of Bed CARE Score 4   Sit to Stand   Type of Assistance Needed Supervision   Comment CS with RW   Sit to Stand CARE Score 4   Bed-Chair Transfer   Type of Assistance Needed Incidental touching; Adaptive equipment   Comment CGA with RW   Chair/Bed-to-Chair Transfer CARE Score 4   Transfer Bed/Chair/Wheelchair   Adaptive Equipment Roller Walker   Ambulation   Primary Mode of Locomotion Prior to Admission Walk   Distance Walked (feet) 30 ft   Assist Device Other  (along R rail for strengthening only )   Gait Pattern Slow Josefina;Narrow LUIS;Shuffle   Limitations Noted In Heel Strike; Endurance;Speed;Strength   Provided Assistance with: Balance   Walk Assist Level Minimum Assist   Does the patient walk? 2  Yes   Wheel 50 Feet with Two Turns   Type of Assistance Needed Supervision   Wheel 50 Feet with Two Turns CARE Score 4   Wheel 150 Feet   Type of Assistance Needed Supervision   Wheel 150 Feet CARE Score 4   Wheelchair mobility   Does the patient use a wheelchair? 1  Yes   Type of Wheelchair Used 1  Manual   Method Right upper extremity; Left upper extremity   Assistance Provided For Locking Brakes; Remove Leg Rest;Replace Leg Rest;Remove armrests;Replace armrests   Distance Level Surface (feet) 150 ft  (x2)   Curb or Single Stair   Style negotiated Curb   Type of Assistance Needed Physical assistance;Verbal cues; Adaptive equipment   Physical Assistance Level 25% or less   Comment 6" curb step x2 reps with RW    1 Step (Curb) CARE Score 3   Stairs   Type Curb   # of Steps 2   Weight Bearing Precautions Fall Risk   Assist Devices Roller Marykandis Bell   Findings per pt after discussion with wife, front entrance would be best option as garage steps have no rails   Therapeutic Interventions   Balance static standing no UE support ~30 sec   Neuromuscular Re-Education 30' along R rail for strengtheing and endurance  Pt educated on cont use of RW at home  Assessment   Treatment Assessment Brief 30 min session with increased focus on stair negotiation, balance and strengthening  Pt noted improved ability to tolerate curb steps this session as he only required [facility] and was able to perform 2  with no rest breaks  Noted improved balance and endurance this session as well  Pt will cont to work on increased I with RW for [facility] dist  and use of WC as needed  Would cont to benefit from balance and endurance training  Problem List Decreased strength;Decreased endurance;Decreased mobility; Impaired balance;Orthopedic restrictions;Pain   Barriers to Discharge Inaccessible home environment;Decreased caregiver support   PT Barriers   Functional Limitation Car transfers; Ramp negotiation;Stair negotiation;Standing;Transfers; Walking   Plan   Treatment/Interventions Functional transfer training;LE strengthening/ROM; Therapeutic exercise; Endurance training;Patient/family training;Gait training   Progress Progressing toward goals   Recommendation   PT Discharge Recommendation Home with home health rehabilitation   Equipment Recommended Wheelchair;Walker  (hospital bed?)   PT Therapy Minutes   PT Time In 1000   PT Time Out 1030   PT Total Time (minutes) 30   PT Mode of treatment - Individual (minutes) 30   PT Mode of treatment - Concurrent (minutes) 0   PT Mode of treatment - Group (minutes) 0   PT Mode of treatment - Co-treat (minutes) 0   PT Mode of Treatment - Total time(minutes) 30 minutes   PT Cumulative Minutes 1030   Therapy Time missed   Time missed?  No

## 2023-01-01 NOTE — PROGRESS NOTES
Physical Medicine and Rehabilitation Progress Note  Osvaldo Grey 61 y o  male MRN: [id number]  Unit/Bed#: [facility] 964-01 Encounter: [id number]    Chief Complaint:  Nausea with doxycycline    Interval History:   No acute events overnight  IR tube check not performed and moved to Tuesday  Denies any new lightheadedness, dizziness, CP, SOB, fevers, chills, vomiting  Continues with pain near drain sight unchanged  Ostomy functioning  Proud of himself as he was able to walk by just holding handrail with therapies  Assessment/Plan - Continue plan of care as per primary attending, unless otherwise stated below:      · Transverse colon cancer with liver mets:  · S/p palliative chemo  · S/p hepatic resection and reversel of colostomy on 11/7  · S/p right hemicolectomy with partial descending colectomy colocolonic anastamosis with loop ileostomy and midline abdominal VAC 11/16, re-exploration 11/17  · Has port-a-cath  · Has had some dehiscence managed with local care with surg onc and wound care  · Acute cholecystitis, Abdominal Abscesses, Elevated alk phos  · Has 3 drains  Monitoring output  · Has perc radha tube  · Transitioned to doxycycline form ertapenem, but now with nausea  · ID to see in AM  Maybe just take Zofran if QTc acceptable  · Plan for tube check on 1/3  · HTN  · T2DM  · Labile/fluctant  IM adjusting insulins as appropriate  · CKD (BL 1 2-1 4)  · Recheck BMP on 1/3  · Anemia  · Repeat CBC 1/3  · Adjustment disorder with Depressed Mood  · Malnutrition/Obesity  · Dehiscence of incision  · Acute Pain  · DVT PPx: SQH, SCDs    Jana Blackwell MD  Physical Medicine and Rehabilitation      Review of Systems: A 10 point review of systems was negative except for what is noted in the HPI          Current Facility-Administered Medications:   •  acetaminophen (TYLENOL) tablet 975 mg, 975 mg, Oral, Q8H Jenelle ORTEGA CRNP, 975 mg at 01/01/23 1417  •  amLODIPine (NORVASC) tablet 5 mg, 5 mg, Oral, BID, Jenelle GAURANG Martin, 5 mg at 01/01/23 5187  •  aspirin chewable tablet 81 mg, 81 mg, Oral, Daily, GAURANG Keith, 81 mg at 01/01/23 0769  •  atorvastatin (LIPITOR) tablet 40 mg, 40 mg, Oral, Daily, GAURANG Keith, 40 mg at 01/01/23 0843  •  calcium carbonate (TUMS) chewable tablet 500 mg, 500 mg, Oral, BID PRN, GAURANG Keith, 500 mg at 12/28/22 1550  •  cholecalciferol (VITAMIN D3) tablet 400 Units, 400 Units, Oral, Daily, GAURANG Keith, 400 Units at 01/01/23 0843  •  collagenase (SANTYL) ointment, , Topical, Daily, Zeenat Ny MD, Given at 01/01/23 9538  •  doxycycline hyclate (VIBRAMYCIN) capsule 100 mg, 100 mg, Oral, Q12H [facility], Zeenat Ny MD, 100 mg at 01/01/23 6226  •  heparin (porcine) subcutaneous injection 5,000 Units, 5,000 Units, Subcutaneous, Q8H [facility], GAURANG Keith, 5,000 Units at 01/01/23 7301  •  insulin glargine (LANTUS) subcutaneous injection 10 Units 0 1 mL, 10 Units, Subcutaneous, HS, GAURANG Cardenas, 10 Units at 12/31/22 2142  •  insulin lispro (HumaLOG) 100 units/mL subcutaneous injection 1-5 Units, 1-5 Units, Subcutaneous, HS, GAURANG Keith, 1 Units at 12/31/22 2142  •  insulin lispro (HumaLOG) 100 units/mL subcutaneous injection 1-6 Units, 1-6 Units, Subcutaneous, TID AC, 1 Units at 12/31/22 1717 **AND** Fingerstick Glucose (POCT), , , TID AC, GAURANG Keith  •  insulin lispro (HumaLOG) 100 units/mL subcutaneous injection 6 Units, 6 Units, Subcutaneous, TID With Meals, GAURANG Cardenas, 6 Units at 01/01/23 0844  •  iohexol (OMNIPAQUE) 240 MG/ML solution 50 mL, 50 mL, Oral, 90 min pre-procedure, Diane Lamb MD  •  melatonin tablet 3 mg, 3 mg, Oral, HS, GAURANG Keith, 3 mg at 12/31/22 2204  •  methocarbamol (ROBAXIN) tablet 500 mg, 500 mg, Oral, BID, GAURANG Keith, 500 mg at 01/01/23 0843  •  multivitamin-minerals (CENTRUM) tablet 1 tablet, 1 tablet, Oral, Daily, GAURANG Keith, 1 tablet at 01/01/23 0843  • ondansetron (ZOFRAN-ODT) dispersible tablet 4 mg, 4 mg, Oral, Q6H PRN, GAURANG Keith, 4 mg at 01/01/23 0849  •  oxyCODONE (ROXICODONE) immediate release tablet 10 mg, 10 mg, Oral, Q6H PRN, GAURANG Keith, 10 mg at 12/31/22 2204  •  oxyCODONE (ROXICODONE) IR tablet 2 5 mg, 2 5 mg, Oral, Q3H PRN, GAURANG Keith, 2 5 mg at 12/25/22 1911  •  oxyCODONE (ROXICODONE) IR tablet 5 mg, 5 mg, Oral, Q6H PRN, GAURANG Keith, 5 mg at 12/31/22 0941  •  pantoprazole (PROTONIX) EC tablet 40 mg, 40 mg, Oral, BID AC, GAURANG Keith, 40 mg at 01/01/23 3188  •  simethicone (MYLICON) chewable tablet 80 mg, 80 mg, Oral, 4x Daily (with meals and at bedtime), GAURANG Keith, 80 mg at 01/01/23 0843  •  tamsulosin (FLOMAX) capsule 0 4 mg, 0 4 mg, Oral, Daily With Dinner, GAURANG Painter, 0 4 mg at 12/31/22 1814    Physical Exam:  Temp:  [98 °F (36 7 °C)-98 9 °F (37 2 °C)] 98 °F (36 7 °C)  HR:  [] 73  Resp:  [16-19] 18  BP: (128-167)/(74-85) 145/75  SpO2:  [97 %] 97 %      Gen: No acute distress  HEENT: Moist mucus membranes, Normocephalic/Atraumatic  Cardiovascular: Regular rate, rhythm, S1/S2  Distal pulses palpable  Heme/Extr: No edema/clubbing/cyanosis  Pulmonary: Non-labored breathing  Lungs CTAB  : No rivero  GI: Soft, non-tender, non-distended  BS+  Drains present, perc radha tube presents  Dressing C/D/I  Neuro: AAOx3,  Speech is intact  Appropriate to questioning  Psych: Normal mood and affect       Laboratory:  Labs reviewed  Results from last 7 days   Lab Units 12/31/22  0458 12/30/22  0639 12/28/22  0526   HEMOGLOBIN g/dL 8 0* 7 0* 7 1*   HEMATOCRIT % 27 5* 23 2* 23 8*   WBC Thousand/uL 10 91* 10 15 11 91*     Results from last 7 days   Lab Units 12/30/22  0639 12/28/22  0526 12/27/22  1522   BUN mg/dL 20 21 23   SODIUM mmol/L 139 139 136   POTASSIUM mmol/L 4 1 3 8 3 9   CHLORIDE mmol/L 112* 112* 110*   CREATININE mg/dL 1 23 1 21 1 54*   AST U/L 46* 46* 51*   ALT U/L 16 15 18 Diagnostic Studies: Reviewed, no new imaging   IR drainage tube placement   Final Result by Krystal rBiggs MD (12/20 2003)      Percutaneous placement of abscess drainage catheters into the perigastric and perisplenic abscesses using 10-[place] catheters  Plan:       - Catheters to bulb suction drainage    - Flush catheters with 10 mL normal saline daily    - Return in 2 weeks for drain check  Workstation performed: [id number]         CT abdomen pelvis w contrast   Final Result by Tadeo Navarrete MD (23/90 6909)      Status post cholecystostomy tube placement with decompression of the gallbladder  In addition, a catheter placed adjacent to the cut edge of the liver is increased position with near complete resolution of fluid collection  Loculated collection along the splenic recess of the lesser sac is unchanged in size  Additional perisplenic collection in the hilum and along the inferior pole are unchanged  Collection of fluid and gas in the left upper quadrant adjacent to left kidney also not significantly changed  No extravasated oral contrast             Workstation performed: [id number]         IR drainage tube check/change/reposition/reinsertion/upsize    (Results Pending)   IR cholecystostomy tube check and/or removal    (Results Pending)   IR drainage tube check and/or removal    (Results Pending)         ** Please Note: Fluency Direct voice to text software may have been used in the creation of this document   **

## 2023-01-01 NOTE — PLAN OF CARE
Problem: Prexisting or High Potential for Compromised Skin Integrity  Goal: Skin integrity is maintained or improved  Description: INTERVENTIONS:  - Identify patients at risk for skin breakdown  - Assess and monitor skin integrity  - Assess and monitor nutrition and hydration status  - Monitor labs   - Assess for incontinence   - Turn and reposition patient  - Assist with mobility/ambulation  - Relieve pressure over bony prominences  - Avoid friction and shearing  - Provide appropriate hygiene as needed including keeping skin clean and dry  - Evaluate need for skin moisturizer/barrier cream  - Collaborate with interdisciplinary team   - Patient/family teaching  - Consider wound care consult   Outcome: Progressing     Problem: Potential for Falls  Goal: Patient will remain free of falls  Description: INTERVENTIONS:  - Educate patient/family on patient safety including physical limitations  - Instruct patient to call for assistance with activity   - Consult OT/PT to assist with strengthening/mobility   - Keep Call bell within reach  - Keep bed low and locked with side rails adjusted as appropriate  - Keep care items and personal belongings within reach  - Initiate and maintain comfort rounds  - Make Fall Risk Sign visible to staff  - Offer Toileting every 2 Hours, in advance of need  - Initiate/Maintain bed/chair alarm  - Obtain necessary fall risk management equipment: non skid footwear  - Apply yellow socks and bracelet for high fall risk patients  - Consider moving patient to room near nurses station  Outcome: Progressing     Problem: PAIN - ADULT  Goal: Verbalizes/displays adequate comfort level or baseline comfort level  Description: Interventions:  - Encourage patient to monitor pain and request assistance  - Assess pain using appropriate pain scale  - Administer analgesics based on type and severity of pain and evaluate response  - Implement non-pharmacological measures as appropriate and evaluate response  - Consider cultural and social influences on pain and pain management  - Notify physician/advanced practitioner if interventions unsuccessful or patient reports new pain  Outcome: Progressing     Problem: INFECTION - ADULT  Goal: Absence or prevention of progression during hospitalization  Description: INTERVENTIONS:  - Assess and monitor for signs and symptoms of infection  - Monitor lab/diagnostic results  - Monitor all insertion sites, i e  indwelling lines, tubes, and drains  - Monitor endotracheal if appropriate and nasal secretions for changes in amount and color  - [place] appropriate cooling/warming therapies per order  - Administer medications as ordered  - Instruct and encourage patient and family to use good hand hygiene technique  - Identify and instruct in appropriate isolation precautions for identified infection/condition  Outcome: Progressing     Problem: SAFETY ADULT  Goal: Patient will remain free of falls  Description: INTERVENTIONS:  - Educate patient/family on patient safety including physical limitations  - Instruct patient to call for assistance with activity   - Consult OT/PT to assist with strengthening/mobility   - Keep Call bell within reach  - Keep bed low and locked with side rails adjusted as appropriate  - Keep care items and personal belongings within reach  - Initiate and maintain comfort rounds  - Make Fall Risk Sign visible to staff  - Offer Toileting every 2 Hours, in advance of need  - Initiate/Maintain bed/chair alarm  - Obtain necessary fall risk management equipment: non skid footwear  - Apply yellow socks and bracelet for high fall risk patients  - Consider moving patient to room near nurses station  Outcome: Progressing  Goal: Maintain or return to baseline ADL function  Description: INTERVENTIONS:  -  Assess patient's ability to carry out ADLs; assess patient's baseline for ADL function and identify physical deficits which impact ability to perform ADLs (bathing, care of mouth/teeth, toileting, grooming, dressing, etc )  - Assess/evaluate cause of self-care deficits   - Assess range of motion  - Assess patient's mobility; develop plan if impaired  - Assess patient's need for assistive devices and provide as appropriate  - Encourage maximum independence but intervene and supervise when necessary  - Involve family in performance of ADLs  - Assess for home care needs following discharge   - Consider OT consult to assist with ADL evaluation and planning for discharge  - Provide patient education as appropriate  Outcome: Progressing  Goal: Maintains/Returns to pre admission functional level  Description: INTERVENTIONS:  - Perform BMAT or MOVE assessment daily    - Set and communicate daily mobility goal to care team and patient/family/caregiver  - Collaborate with rehabilitation services on mobility goals if consulted  - Perform Range of Motion 3 times a day  - Reposition patient every 2 hours    - Dangle patient 3 times a day  - Stand patient 3 times a day  - Ambulate patient 3 times a day  - Out of bed to chair 3 times a day   - Out of bed for meals 3 times a day  - Out of bed for toileting  - Record patient progress and toleration of activity level   Outcome: Progressing     Problem: DISCHARGE PLANNING  Goal: Discharge to home or other facility with appropriate resources  Description: INTERVENTIONS:  - Identify barriers to discharge w/patient and caregiver  - Arrange for needed discharge resources and transportation as appropriate  - Identify discharge learning needs (meds, wound care, etc )  - Arrange for interpretive services to assist at discharge as needed  - Refer to Case Management Department for coordinating discharge planning if the patient needs post-hospital services based on physician/advanced practitioner order or complex needs related to functional status, cognitive ability, or social support system  Outcome: Progressing     Problem: Nutrition/Hydration-ADULT  Goal: Nutrient/Hydration intake appropriate for improving, restoring or maintaining nutritional needs  Description: Monitor and assess patient's nutrition/hydration status for malnutrition  Collaborate with interdisciplinary team and initiate plan and interventions as ordered  Monitor patient's weight and dietary intake as ordered or per policy  Utilize nutrition screening tool and intervene as necessary  Determine patient's food preferences and provide high-protein, high-caloric foods as appropriate       INTERVENTIONS:  - Monitor oral intake, urinary output, labs, and treatment plans  - Assess nutrition and hydration status and recommend course of action  - Evaluate amount of meals eaten  - Assist patient with eating if necessary   - Allow adequate time for meals  - Recommend/ encourage appropriate diets, oral nutritional supplements, and vitamin/mineral supplements  - Order, calculate, and assess calorie counts as needed  - Recommend, monitor, and adjust tube feedings and TPN/PPN based on assessed needs  - Assess need for intravenous fluids  - Provide specific nutrition/hydration education as appropriate  - Include patient/family/caregiver in decisions related to nutrition  Outcome: Progressing     Problem: MOBILITY - ADULT  Goal: Maintain or return to baseline ADL function  Description: INTERVENTIONS:  -  Assess patient's ability to carry out ADLs; assess patient's baseline for ADL function and identify physical deficits which impact ability to perform ADLs (bathing, care of mouth/teeth, toileting, grooming, dressing, etc )  - Assess/evaluate cause of self-care deficits   - Assess range of motion  - Assess patient's mobility; develop plan if impaired  - Assess patient's need for assistive devices and provide as appropriate  - Encourage maximum independence but intervene and supervise when necessary  - Involve family in performance of ADLs  - Assess for home care needs following discharge   - Consider OT consult to assist with ADL evaluation and planning for discharge  - Provide patient education as appropriate  Outcome: Progressing  Goal: Maintains/Returns to pre admission functional level  Description: INTERVENTIONS:  - Perform BMAT or MOVE assessment daily    - Set and communicate daily mobility goal to care team and patient/family/caregiver  - Collaborate with rehabilitation services on mobility goals if consulted  - Perform Range of Motion 3 times a day  - Reposition patient every 2 hours    - Dangle patient 3 times a day  - Stand patient 3 times a day  - Ambulate patient 3 times a day  - Out of bed to chair 3 times a day   - Out of bed for meals 3 times a day  - Out of bed for toileting  - Record patient progress and toleration of activity level   Outcome: Progressing

## 2023-01-01 NOTE — PROGRESS NOTES
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  • s/p hepatic resection and reversal of colostomy on 11/7  • s/p right hemicolectomy with partial descending colectomy colocolonic anastomosis with loop ileostomy and mid line abdominal VAC placement 11/16  • Continue local wound care; WC following  • CT  Abd/pelvis 12/17 = loculated collection along splenic recess   Has perisplenic hilum collection as well --> to IR 12/20 for drain placement in both perigastric and perisplenic area and cx sent from both areas = Ecoli  • Flush drains with 10ml qd  • 12/30 VICTORINO drainage = 10/5/5 ml   • S-O following   • Tube check (incl perc alvina tube) for 1/3 AM     Acute cholecystitis  • s/p percutaneous tube placement 12/8 with tube check on 12/12, 12/14  • GI saw due to alk phos elevation = felt 2/2 infiltrative disease rather than focal biliary obstruction   AMA was negative     • Alkaline phosphatase isoenzyme sent 12/17 (48% liver/52% bone) = GI  recommended continuing to monitor his LFTs and defer to surgical oncology for further w/u and plan  Last 3 APs were [phone]  Will recheck 1/3  • TB improving = to consider MRCP if total bili trends back up but was normal 12/30  • Alvina tube = 10 ml 12/30   Drainage is dark bloody as always  • For perc alvina tube check 1/3  Did discuss alk phos levels with Dr Kiran Gabriel today = stable but he suggested a cholangiogram be done with the tube check  I did TT Dr David Durham today who is on for IR so the message gets to them for 1/3     Bacteremia/abdominal abscess  • ID following   • s/p Ertapenem and now to switch to doxycycline per ID as of 12/30  • Blood cultures negative  • CT abdomen and pelvis as above  • Says nausea since starting Doxy on 12/30    Lasts 1-2 hrs and affects appeitie for lunch  Notified ID and they will reassess 1/2/23      Hypertension  • On Norvasc 5mg BID  • stable     Diabetes mellitus  • On Lantus 10U qhs/Lispro 6U TID  • Continue DM diet and QID Accuchecks/SSI  • Increased Lantus to 10U qhs from 6U to start 12/31/22  • Eating/meal completion is erratic and therefore BSs difficult  • May have to consider stopping Lispro WM and increasing coverage scale instead     Acute on chronic renal failure  • Stage III; baseline 1 2-1 4  • Lisinopril currently on hold  • s/p NSS IVF with improvement of creat =  back down to 1 2 12/30  • BMP 1/3     Anemia  • Multifactorial due to recent infection, surgery, CKD stage III  • Transfused on 12/15 for hemoglobin 7 3 and 12/16/22 for hgb of 8   • No transfusion for now per pt request but agreeable if it drops below 7  • Hemoglobin down to 7 on 12/30 and transfuse one unit PRBCs  with repeat hemoglobin 8  • Will recheck CBC 1/2/23     Atypical chest pain  • With elevation in troponin/EKG changes  • Cardiology cleared pt for discharge  • Did not recommend any further work up  • No further chest pain     Situational depression  • Neuropsychology consulted  • Patient not interested in medications at this point in time     Malnutrition  • Recommend dietician consultation to optimize wound healing  • Glucerna makes him nauseated  • On 12/15, ordered double protein     Pleural effusion  • CXR on 12/14 showed small left pleural effusion and was suspect for CHF  • ECHO 11/9/22 = LVEF 55%, unable to assess diastolic function, mild TR     L sided pleuritic pain  • located near drain site and says only occurs at night he says  • No evidence of infection        Discharge date:  1/9/23       The above assessment and plan was reviewed and updated as determined by my evaluation of the patient on 1/1/2023      Labs:   Results from last 7 days   Lab Units 12/31/22  0458 12/30/22  0639   WBC Thousand/uL 10 91* 10 15   HEMOGLOBIN g/dL 8 0* 7 0*   HEMATOCRIT % 27 5* 23 2*   PLATELETS Thousands/uL 600* 501*     Results from last 7 days   Lab Units 12/30/22  0639 12/28/22  0526   SODIUM mmol/L 139 139   POTASSIUM mmol/L 4 1 3 8   CHLORIDE mmol/L 112* 112*   CO2 mmol/L 21 21   BUN mg/dL 20 21   CREATININE mg/dL 1 23 1 21   CALCIUM mg/dL 9 0 8 9             Results from last 7 days   Lab Units 01/01/23  1058 01/01/23  0636 12/31/22  2047   POC GLUCOSE mg/dl 210* 129 162*       Review of Scheduled Meds:  Current Facility-Administered Medications   Medication Dose Route Frequency Provider Last Rate   • acetaminophen  975 mg Oral Q8H [address] GAURANG Keith     • amLODIPine  5 mg Oral BID GAURANG Keith     • aspirin  81 mg Oral Daily GAURANG Keith     • atorvastatin  40 mg Oral Daily GAURANG Keith     • calcium carbonate  500 mg Oral BID PRN GAURANG Triplett     • cholecalciferol  400 Units Oral Daily GAURANG Keith     • collagenase   Topical Daily Johnna Lee MD     • doxycycline hyclate  100 mg Oral Q12H Herminio Garay MD     • heparin (porcine)  5,000 Units Subcutaneous Q8H [address] GAURANG Keith     • insulin glargine  10 Units Subcutaneous HS GAURANG Graf     • insulin lispro  1-5 Units Subcutaneous HS GAURANG Keith     • insulin lispro  1-6 Units Subcutaneous TID AC GAURANG Keith     • insulin lispro  6 Units Subcutaneous TID With Meals GAURANG Graf     • iohexol  50 mL Oral 90 min pre-procedure Oni Garland MD     • melatonin  3 mg Oral HS GAURANG Keith     • methocarbamol  500 mg Oral BID GAURANG Triplett     • multivitamin-minerals  1 tablet Oral Daily GAURANG Keith     • ondansetron  4 mg Oral Q6H PRN GAURANG Triplett     • oxyCODONE  10 mg Oral Q6H PRN GAURANG Keith     • oxyCODONE  2 5 mg Oral Q3H PRN GAURANG Keith     • oxyCODONE  5 mg Oral Q6H PRN GAURANG Keith     • pantoprazole  40 mg Oral BID AC Jenelle Martin, GAURANG     • simethicone  80 mg Oral 4x Daily (with meals and at bedtime) GAURANG Jimenez     • tamsulosin  0 4 mg Oral Daily With Dinner GAURANG Jimenez         Subjective/ HPI: Patient seen and examined  Patients overnight issues or events were reviewed with nursing or staff during rounds or morning huddle session  New or overnight issues include the following:     No new or overnight issues  Offers no complaints    ROS:   A 10 point ROS was performed; negative except as noted above  Imaging:     IR drainage tube placement   Final Result by Elli Tenorio MD (12/20 2003)      Percutaneous placement of abscess drainage catheters into the perigastric and perisplenic abscesses using 10-French catheters  Plan:       - Catheters to bulb suction drainage    - Flush catheters with 10 mL normal saline daily    - Return in 2 weeks for drain check  Workstation performed: [id number]         CT abdomen pelvis w contrast   Final Result by Raffi Flores MD (57/06 0319)      Status post cholecystostomy tube placement with decompression of the gallbladder  In addition, a catheter placed adjacent to the cut edge of the liver is increased position with near complete resolution of fluid collection  Loculated collection along the splenic recess of the lesser sac is unchanged in size  Additional perisplenic collection in the hilum and along the inferior pole are unchanged  Collection of fluid and gas in the left upper quadrant adjacent to left kidney also not significantly changed    No extravasated oral contrast             Workstation performed: [id number]         IR drainage tube check/change/reposition/reinsertion/upsize    (Results Pending)   IR cholecystostomy tube check and/or removal    (Results Pending)   IR drainage tube check and/or removal    (Results Pending)       *Labs /Radiology studies reviewed  *Medications reviewed and reconciled as needed  *Please refer to order section for additional ordered labs studies  *Case discussed with primary attending during morning huddle case rounds    Physical Examination:  Vitals:   Vitals:    12/31/22 1340 12/31/22 1812 12/31/22 2129 01/01/23 0643   BP: 148/82 128/74 167/85 145/75   BP Location: Right arm Left arm Left arm Right arm   Pulse: 92 96 101 73   Resp: 16  19 18   Temp: 98 9 °F (37 2 °C)  98 9 °F (37 2 °C) 98 °F (36 7 °C)   TempSrc: Oral  Oral Oral   SpO2: 97%  97% 97%   Weight:       Height:           General Appearance: no distress, conversive  HEENT: PERRLA, conjuctiva normal; oropharynx clear; mucous membranes moist   Neck:  Supple, normal ROM  Lungs: CTA, normal respiratory effort, no retractions, expiratory effort normal  CV: regular rate and rhythm; no rubs/murmurs/gallops, PMI normal   ABD: soft; ND/NT; +BS  EXT: no edema  Skin: normal turgor, normal texture, no rashes  Psych: affect normal, mood normal  Neuro: AAO      The above physical exam was reviewed and updated as determined by my evaluation of the patient on 1/1/2023  Invasive Devices     Central Venous Catheter Line  Duration           Port A Cath 03/10/22 Right Chest 297 days          Drain  Duration           Ileostomy LUQ 44 days    Abscess Drain Abdomen 23 days    Cholecystostomy Tube 19 days    Abscess Drain Abdomen 11 days    Abscess Drain Back 11 days                   VTE Pharmacologic Prophylaxis: Heparin  Code Status: Level 1 - Full Code  Current Length of Stay: 18 day(s)      Total time spent:  30 minutes with more than 50% spent counseling/coordinating care  Counseling includes discussion with patient re: progress  and discussion with patient of his/her current medical state/information  Coordination of patient's care was performed in conjunction with primary service  Time invested included review of patient's labs, vitals, and management of their comorbidities with continued monitoring   In addition, this patient was discussed with medical team including physician and advanced extenders  The care of the patient was extensively discussed and appropriate treatment plan was formulated unique for this patient  Medical decision making for the day was made by supervising physician unless otherwise noted in their attestation statement  ** Please Note:  voice to text software may have been used in the creation of this document   Although proof errors in transcription or interpretation are a potential of such software**

## 2023-01-02 LAB
BASOPHILS # BLD AUTO: 0.06 THOUSANDS/ÂΜL (ref 0–0.1)
BASOPHILS NFR BLD AUTO: 1 % (ref 0–1)
EOSINOPHIL # BLD AUTO: 0.19 THOUSAND/ÂΜL (ref 0–0.61)
EOSINOPHIL NFR BLD AUTO: 2 % (ref 0–6)
ERYTHROCYTE [DISTWIDTH] IN BLOOD BY AUTOMATED COUNT: 18.1 % (ref 11.6–15.1)
GLUCOSE SERPL-MCNC: 109 MG/DL (ref 65–140)
GLUCOSE SERPL-MCNC: 122 MG/DL (ref 65–140)
GLUCOSE SERPL-MCNC: 127 MG/DL (ref 65–140)
GLUCOSE SERPL-MCNC: 142 MG/DL (ref 65–140)
HCT VFR BLD AUTO: 26.9 % (ref 36.5–49.3)
HGB BLD-MCNC: 8.1 G/DL (ref 12–17)
IMM GRANULOCYTES # BLD AUTO: 0.31 THOUSAND/UL (ref 0–0.2)
IMM GRANULOCYTES NFR BLD AUTO: 2 % (ref 0–2)
LYMPHOCYTES # BLD AUTO: 2.16 THOUSANDS/ÂΜL (ref 0.6–4.47)
LYMPHOCYTES NFR BLD AUTO: 17 % (ref 14–44)
MCH RBC QN AUTO: 25.8 PG (ref 26.8–34.3)
MCHC RBC AUTO-ENTMCNC: 30.1 G/DL (ref 31.4–37.4)
MCV RBC AUTO: 86 FL (ref 82–98)
MONOCYTES # BLD AUTO: 1.28 THOUSAND/ÂΜL (ref 0.17–1.22)
MONOCYTES NFR BLD AUTO: 10 % (ref 4–12)
NEUTROPHILS # BLD AUTO: 8.67 THOUSANDS/ÂΜL (ref 1.85–7.62)
NEUTS SEG NFR BLD AUTO: 68 % (ref 43–75)
NRBC BLD AUTO-RTO: 0 /100 WBCS
PLATELET # BLD AUTO: 620 THOUSANDS/UL (ref 149–390)
PMV BLD AUTO: 10 FL (ref 8.9–12.7)
RBC # BLD AUTO: 3.14 MILLION/UL (ref 3.88–5.62)
WBC # BLD AUTO: 12.67 THOUSAND/UL (ref 4.31–10.16)

## 2023-01-02 RX ORDER — ONDANSETRON 4 MG/1
4 TABLET, ORALLY DISINTEGRATING ORAL 2 TIMES DAILY
Status: DISCONTINUED | OUTPATIENT
Start: 2023-01-02 | End: 2023-01-17 | Stop reason: HOSPADM

## 2023-01-02 RX ORDER — INSULIN GLARGINE 100 [IU]/ML
10 INJECTION, SOLUTION SUBCUTANEOUS
Status: DISCONTINUED | OUTPATIENT
Start: 2023-01-03 | End: 2023-01-11

## 2023-01-02 RX ADMIN — SIMETHICONE 80 MG: 80 TABLET, CHEWABLE ORAL at 17:04

## 2023-01-02 RX ADMIN — HEPARIN SODIUM 5000 UNITS: 5000 INJECTION INTRAVENOUS; SUBCUTANEOUS at 21:02

## 2023-01-02 RX ADMIN — AMLODIPINE BESYLATE 5 MG: 5 TABLET ORAL at 17:04

## 2023-01-02 RX ADMIN — ONDANSETRON 4 MG: 4 TABLET, ORALLY DISINTEGRATING ORAL at 12:21

## 2023-01-02 RX ADMIN — HEPARIN SODIUM 5000 UNITS: 5000 INJECTION INTRAVENOUS; SUBCUTANEOUS at 05:53

## 2023-01-02 RX ADMIN — CALCIUM CARBONATE (ANTACID) CHEW TAB 500 MG 500 MG: 500 CHEW TAB at 19:57

## 2023-01-02 RX ADMIN — DOXYCYCLINE 100 MG: 100 CAPSULE ORAL at 21:39

## 2023-01-02 RX ADMIN — ONDANSETRON 4 MG: 4 TABLET, ORALLY DISINTEGRATING ORAL at 15:33

## 2023-01-02 RX ADMIN — MELATONIN TAB 3 MG 3 MG: 3 TAB at 21:02

## 2023-01-02 RX ADMIN — SIMETHICONE 80 MG: 80 TABLET, CHEWABLE ORAL at 13:14

## 2023-01-02 RX ADMIN — ACETAMINOPHEN 975 MG: 325 TABLET, FILM COATED ORAL at 05:53

## 2023-01-02 RX ADMIN — METHOCARBAMOL 500 MG: 500 TABLET ORAL at 17:04

## 2023-01-02 RX ADMIN — PANTOPRAZOLE SODIUM 40 MG: 40 TABLET, DELAYED RELEASE ORAL at 15:33

## 2023-01-02 RX ADMIN — DOXYCYCLINE 100 MG: 100 CAPSULE ORAL at 13:13

## 2023-01-02 RX ADMIN — TAMSULOSIN HYDROCHLORIDE 0.4 MG: 0.4 CAPSULE ORAL at 15:33

## 2023-01-02 RX ADMIN — ACETAMINOPHEN 975 MG: 325 TABLET, FILM COATED ORAL at 21:02

## 2023-01-02 RX ADMIN — ATORVASTATIN CALCIUM 40 MG: 40 TABLET, FILM COATED ORAL at 13:12

## 2023-01-02 RX ADMIN — ONDANSETRON 4 MG: 4 TABLET, ORALLY DISINTEGRATING ORAL at 19:56

## 2023-01-02 RX ADMIN — ACETAMINOPHEN 975 MG: 325 TABLET, FILM COATED ORAL at 13:10

## 2023-01-02 RX ADMIN — ASPIRIN 81 MG CHEWABLE TABLET 81 MG: 81 TABLET CHEWABLE at 13:12

## 2023-01-02 RX ADMIN — COLLAGENASE SANTYL: 250 OINTMENT TOPICAL at 10:00

## 2023-01-02 RX ADMIN — PANTOPRAZOLE SODIUM 40 MG: 40 TABLET, DELAYED RELEASE ORAL at 06:02

## 2023-01-02 RX ADMIN — SIMETHICONE 80 MG: 80 TABLET, CHEWABLE ORAL at 21:03

## 2023-01-02 RX ADMIN — INSULIN LISPRO 6 UNITS: 100 INJECTION, SOLUTION INTRAVENOUS; SUBCUTANEOUS at 11:15

## 2023-01-02 RX ADMIN — HEPARIN SODIUM 5000 UNITS: 5000 INJECTION INTRAVENOUS; SUBCUTANEOUS at 13:10

## 2023-01-02 RX ADMIN — OXYCODONE HYDROCHLORIDE 10 MG: 10 TABLET ORAL at 21:03

## 2023-01-02 NOTE — PROGRESS NOTES
Progress Note - Surgical Oncology   Roslyn Harper 61 y o  male MRN: [id number]  Unit/Bed#: -42 Encounter: [id number]  01/02/23  10:07 AM      Subjective/Objective   No chief complaint on file  Subjective: No complaints    Objective: Awaiting tube check    Blood pressure 151/84, pulse 94, temperature 98 3 °F (36 8 °C), temperature source Oral, resp  rate 18, height 5' 10" (1 778 m), weight 104 kg (230 lb), SpO2 97 %  ,Body mass index is 33 kg/m²  Intake/Output Summary (Last 24 hours) at 1/2/2023 1007  Last data filed at 1/2/2023 5898  Gross per 24 hour   Intake 10 ml   Output 1000 ml   Net -990 ml       Invasive Devices     Central Venous Catheter Line  Duration           Port A Cath 03/10/22 Right Chest 297 days          Drain  Duration           Ileostomy LUQ 45 days    Abscess Drain Abdomen 24 days    Cholecystostomy Tube 20 days    Abscess Drain Abdomen 12 days    Abscess Drain Back 12 days                Physical Exam:   Head and neck: is normocephalic  Neck is supple without adenopathy  Sclerae are anicteric  Mucous membranes are moist  No JVD  Abdomen: Soft, nontender, nondistended, and without masses  ostomy is viable  Drains are purulent on the left  Cholecystostomy drain is bilious with small amount of blood  Incision with good granulation tissue at the base    Extremities: Without cyanosis, clubbing, or edema, symmetric  Neuro: Grossly nonfocal  Skin is warm and anicteric  Psych: Patient is pleasant and talkative              Lab Results:  Lab Results   Component Value Date    WBC 12 67 (H) 01/02/2023    HGB 8 1 (L) 01/02/2023    HCT 26 9 (L) 01/02/2023    MCV 86 01/02/2023     (H) 01/02/2023     Lab Results   Component Value Date    GLUCOSE 151 (H) 11/16/2022    CALCIUM 9 0 12/30/2022     05/02/2017    K 4 1 12/30/2022    CO2 21 12/30/2022     (H) 12/30/2022    BUN 20 12/30/2022    CREATININE 1 23 12/30/2022     No results found for: AFP  Lab Results   Component Value Date    CALCIUM 9 0 12/30/2022    PHOS 3 9 12/11/2022     Lab Results   Component Value Date    CEA 69 2 (H) 08/05/2022             Assessment:  39-year-old male with metastatic colon cancer, status post segment 3 liver resection and transverse colectomy with reversal of ostomy complicated by leak requiring resection and loop ileostomy and IR drainage    Plan:  Diet as tolerated  Tube check by IR tomorrow  Antibiotics as per ID  All other care as per primary service    Merary Dotson MD  10:07 AM

## 2023-01-02 NOTE — QUICK NOTE
PMR Quick Note    No acute events overnight  Last BM 1/1  Seen by ID this AM - they are continuing doxycycline for now  Possibly total 28 days  Monitor nausea  Drain check IR tomorrow  Seen by Dr Jose G Buitrago  Continue current regimen  Noted to have some purulent material in his abscess drain  CBC with WBC up to 12 67  ID and IM monitoring  Continue current regimen  Afebrile and hemodynamically stable  Also on scheduled Tylenol though  Has occasional chills  IM to reach out to ID re: nausea with doxy  Would like to see how drain check goes tomorrow  If really not tolerating doxycycline may go back to ertapenem  Hgb is 8 1 and stable  Would make sure ID ok with this prior to drain check tomorrow       Frankey Beagle, MD  Physical Medicine and Rehabilitation

## 2023-01-02 NOTE — PROGRESS NOTES
01/02/23 1110   Pain Assessment   Pain Assessment Tool FLACC   Pain Rating: FLACC (Rest) - Face 0   Pain Rating: FLACC (Rest) - Legs 0   Pain Rating: FLACC (Rest) - Activity 0   Pain Rating: FLACC (Rest) - Cry 0   Pain Rating: FLACC (Rest) - Consolability 0   Score: FLACC (Rest) 0   Pain Rating: FLACC (Activity) - Face 0   Pain Rating: FLACC (Activity) - Legs 0   Pain Rating: FLACC (Activity) - Activity 0   Pain Rating: FLACC (Activity) - Cry 0   Pain Rating: FLACC (Activity) - Consolability 0   Score: FLACC (Activity) 0   Restrictions/Precautions   Precautions Fall Risk;Contact/isolation;Multiple lines  (VICTORINO drain, ileostomy, IR drain)   Cognition   Overall Cognitive Status WFL   Subjective   Subjective "I have about half of a tank left of gas in me"   Sit to Stand   Type of Assistance Needed Incidental touching   Physical Assistance Level No physical assistance   Sit to Stand CARE Score 4   Bed-Chair Transfer   Type of Assistance Needed Incidental touching   Physical Assistance Level No physical assistance   Chair/Bed-to-Chair Transfer CARE Score 4   Transfer Bed/Chair/Wheelchair   Adaptive Equipment Roller Walker   Car Transfer   Comment Will trial personal vehicle Wednesday during FT session   Walk 10 Feet   Type of Assistance Needed Incidental touching   Physical Assistance Level 25% or less   Comment CGA with RW   Walk 10 Feet CARE Score 3   Ambulation   Primary Mode of Locomotion Prior to Admission Walk   Distance Walked (feet) 30 ft  (x2)   Assist Device Roller Walker   Gait Pattern Inconsistant Josefina; Slow Josefina;Decreased foot clearance;Shuffle   Limitations Noted In Heel Strike; Sequencing;Speed;Strength;Swing   Does the patient walk? 2  Yes   Wheelchair mobility   Findings Reviewed w/c parts management with patient and wife   Will practice foot rests and closing chair up for entry into car next FT session   Curb or Single Stair   Style negotiated Curb   Type of Assistance Needed Incidental touching; Adaptive equipment   Physical Assistance Level No physical assistance   Comment CGA with RW on 6" curb step   1 Step (Curb) CARE Score 4   Assessment   Treatment Assessment Patient engaged in PT treatment session focused on family training  Present for family training was patient's spouse, Lona Sandy present for the duration of the session and will be returning Wednesday and Friday around the same time  Initiated session with discussion of equipment needs  PT to place order for w/c and hospital bed; family encouraged to (I) purchase a standard walker with wheels (wife took a picture and confirmed she will do this; encouraged to bring in for fitting prior to discharge)  Verbally reviewed with wife and patients the parts of the w/c including removable arm rests, cushion and anti tippers  Verbally reviewed features of the hospital bed  Will place to CM this week for ordering  Additionally, patient taking part in the following aspects of functional mobility: STS, SPT, short distance ambulation and curb step negotiation  At this time, patient needing CGA to [place] with a RW  Wife aware that there are times when he needs more assistance and can ambulate farther/dec distances depending on fatigue levels  For this reason, recommending use of w/c to increase patient's (I) and safety due to these levels of fluctuation  She demonstrated good understanding of guarding techniques for the above and will be able to review this again on Wednesday  PT plan for Wednesday will be to take patient outside to perform a transfer into his personal car; wife in agreement  Will provide patient with HEP and set patient up with HHPT services for continued strengthening and progression of functional mobility (I) within his own home  Planning for Monday discharge pending medical progress     Recommendation   PT Discharge Recommendation Home with home health rehabilitation   Equipment Recommended (S)  Wheelchair  ([id number])   Wheelchair Package Recommended Lightweight   Change or Add item to [employer]? Yes, Change Item   What would you like to CHANGE? Longer Armrest Length (Full Length); Wider Seat Width; Longer Seat Depth (Tall-18 inch)  (NO BRAKE EXTENDERS)   W/C Seat Width 18 inch   PT Therapy Minutes   PT Time In 1110   PT Time Out 1210   PT Total Time (minutes) 60   PT Mode of treatment - Individual (minutes) 60   PT Mode of treatment - Concurrent (minutes) 0   PT Mode of treatment - Group (minutes) 0   PT Mode of treatment - Co-treat (minutes) 0   PT Mode of Treatment - Total time(minutes) 60 minutes   PT Cumulative Minutes 1090

## 2023-01-02 NOTE — PLAN OF CARE
Problem: Prexisting or High Potential for Compromised Skin Integrity  Goal: Skin integrity is maintained or improved  Description: INTERVENTIONS:  - Identify patients at risk for skin breakdown  - Assess and monitor skin integrity  - Assess and monitor nutrition and hydration status  - Monitor labs   - Assess for incontinence   - Turn and reposition patient  - Assist with mobility/ambulation  - Relieve pressure over bony prominences  - Avoid friction and shearing  - Provide appropriate hygiene as needed including keeping skin clean and dry  - Evaluate need for skin moisturizer/barrier cream  - Collaborate with interdisciplinary team   - Patient/family teaching  - Consider wound care consult   Outcome: Progressing     Problem: Potential for Falls  Goal: Patient will remain free of falls  Description: INTERVENTIONS:  - Educate patient/family on patient safety including physical limitations  - Instruct patient to call for assistance with activity   - Consult OT/PT to assist with strengthening/mobility   - Keep Call bell within reach  - Keep bed low and locked with side rails adjusted as appropriate  - Keep care items and personal belongings within reach  - Initiate and maintain comfort rounds  - Make Fall Risk Sign visible to staff  - Offer Toileting every Hours, in advance of need  - Initiate/Maintain alarm  - Obtain necessary fall risk management equipment:   - Apply yellow socks and bracelet for high fall risk patients  - Consider moving patient to room near nurses station  Outcome: Progressing     Problem: PAIN - ADULT  Goal: Verbalizes/displays adequate comfort level or baseline comfort level  Description: Interventions:  - Encourage patient to monitor pain and request assistance  - Assess pain using appropriate pain scale  - Administer analgesics based on type and severity of pain and evaluate response  - Implement non-pharmacological measures as appropriate and evaluate response  - Consider cultural and social influences on pain and pain management  - Notify physician/advanced practitioner if interventions unsuccessful or patient reports new pain  Outcome: Progressing     Problem: INFECTION - ADULT  Goal: Absence or prevention of progression during hospitalization  Description: INTERVENTIONS:  - Assess and monitor for signs and symptoms of infection  - Monitor lab/diagnostic results  - Monitor all insertion sites, i e  indwelling lines, tubes, and drains  - Monitor endotracheal if appropriate and nasal secretions for changes in amount and color  - [place] appropriate cooling/warming therapies per order  - Administer medications as ordered  - Instruct and encourage patient and family to use good hand hygiene technique  - Identify and instruct in appropriate isolation precautions for identified infection/condition  Outcome: Progressing     Problem: SAFETY ADULT  Goal: Patient will remain free of falls  Description: INTERVENTIONS:  - Educate patient/family on patient safety including physical limitations  - Instruct patient to call for assistance with activity   - Consult OT/PT to assist with strengthening/mobility   - Keep Call bell within reach  - Keep bed low and locked with side rails adjusted as appropriate  - Keep care items and personal belongings within reach  - Initiate and maintain comfort rounds  - Make Fall Risk Sign visible to staff  - Offer Toileting every Hours, in advance of need  - Initiate/Maintainlarm  - Obtain necessary fall risk management equipment:  - Apply yellow socks and bracelet for high fall risk patients  - Consider moving patient to room near nurses station  Outcome: Progressing  Goal: Maintain or return to baseline ADL function  Description: INTERVENTIONS:  -  Assess patient's ability to carry out ADLs; assess patient's baseline for ADL function and identify physical deficits which impact ability to perform ADLs (bathing, care of mouth/teeth, toileting, grooming, dressing, etc )  - Assess/evaluate cause of self-care deficits   - Assess range of motion  - Assess patient's mobility; develop plan if impaired  - Assess patient's need for assistive devices and provide as appropriate  - Encourage maximum independence but intervene and supervise when necessary  - Involve family in performance of ADLs  - Assess for home care needs following discharge   - Consider OT consult to assist with ADL evaluation and planning for discharge  - Provide patient education as appropriate  Outcome: Progressing  Goal: Maintains/Returns to pre admission functional level  Description: INTERVENTIONS:  - Perform BMAT or MOVE assessment daily    - Set and communicate daily mobility goal to care team and patient/family/caregiver  - Collaborate with rehabilitation services on mobility goals if consulted  - Perform Range of   - Repo  -   -  - Out of bed fo  - Out of bed for toileting  - Record patient progress and toleration of activity level   Outcome: Progressing     Problem: DISCHARGE PLANNING  Goal: Discharge to home or other facility with appropriate resources  Description: INTERVENTIONS:  - Identify barriers to discharge w/patient and caregiver  - Arrange for needed discharge resources and transportation as appropriate  - Identify discharge learning needs (meds, wound care, etc )  - Arrange for interpretive services to assist at discharge as needed  - Refer to Case Management Department for coordinating discharge planning if the patient needs post-hospital services based on physician/advanced practitioner order or complex needs related to functional status, cognitive ability, or social support system  Outcome: Progressing     Problem: Nutrition/Hydration-ADULT  Goal: Nutrient/Hydration intake appropriate for improving, restoring or maintaining nutritional needs  Description: Monitor and assess patient's nutrition/hydration status for malnutrition   Collaborate with interdisciplinary team and initiate plan and interventions as ordered  Monitor patient's weight and dietary intake as ordered or per policy  Utilize nutrition screening tool and intervene as necessary  Determine patient's food preferences and provide high-protein, high-caloric foods as appropriate  INTERVENTIONS:  - Monitor oral intake, urinary output, labs, and treatment plans  - Assess nutrition and hydration status and recommend course of action  - Evaluate amount of meals eaten  - Assist patient with eating if necessary   - Allow adequate time for meals  - Recommend/ encourage appropriate diets, oral nutritional supplements, and vitamin/mineral supplements  - Order, calculate, and assess calorie counts as needed  - Recommend, monitor, and adjust tube feedings and TPN/PPN based on assessed needs  - Assess need for intravenous fluids  - Provide specific nutrition/hydration education as appropriate  - Include patient/family/caregiver in decisions related to nutrition  Outcome: Progressing     Problem: MOBILITY - ADULT  Goal: Maintain or return to baseline ADL function  Description: INTERVENTIONS:  -  Assess patient's ability to carry out ADLs; assess patient's baseline for ADL function and identify physical deficits which impact ability to perform ADLs (bathing, care of mouth/teeth, toileting, grooming, dressing, etc )  - Assess/evaluate cause of self-care deficits   - Assess range of motion  - Assess patient's mobility; develop plan if impaired  - Assess patient's need for assistive devices and provide as appropriate  - Encourage maximum independence but intervene and supervise when necessary  - Involve family in performance of ADLs  - Assess for home care needs following discharge   - Consider OT consult to assist with ADL evaluation and planning for discharge  - Provide patient education as appropriate  Outcome: Progressing  Goal: Maintains/Returns to pre admission functional level  Description: INTERVENTIONS:  - Perform BMAT or MOVE assessment daily  - Set and communicate daily mobility goal to care team and patient/family/caregiver     - Collaborate with rehabilitation services on mobility goals if consulted  - Perform Range of  - Out of b  - Out  - Out of bed for toileting  - Record patient progress and toleration of activity level   Outcome: Progressing

## 2023-01-02 NOTE — PROGRESS NOTES
01/02/23 1000   Pain Assessment   Pain Assessment Tool 0-10   Pain Score No Pain  (3/10 pain with activity at drain insertion site, resolved with repositioning)   Restrictions/Precautions   Precautions Fall Risk;Contact/isolation;Multiple lines  (VICTORINO drain, ileostomy, IR drain)   Weight Bearing Restrictions No   ROM Restrictions No   Shower/Bathe Self   Type of Assistance Needed Physical assistance   Physical Assistance Level 25% or less   Comment Ada for thoroughness of rear hygiene and for balance while in stance  Able to complete all other portions while seated in w/c at sink  Pt and wife decided seated at kitchen sink would be most appropriate area for ADL- SBing at this time  Shower/Bathe Self CARE Score 3   Bathing   Assessed Bath Style Sponge Bath   Anticipated D/C Bath Style Sponge Bath   Able to [place] No   Able to [employer] Temperature Yes   Able to Wash/Rinse/Dry (body part) Left Arm;Right Arm;L Upper Leg;R Upper Leg;L Lower Leg/Foot;R Lower Leg/Foot;Chest;Abdomen;Perineal Area   Limitations Noted in Balance; Endurance;Timeliness;Strength   Positioning Seated;Standing   Tub/Shower Transfer   Reason Not Assessed Medical   Upper Body Dressing   Type of Assistance Needed Supervision   Physical Assistance Level No physical assistance   Comment Inc time for all line mgmt, A warranted today for time sake but pt has demonstrated ability to complete fasteners/line mgmt with inc time previously  Upper Body Dressing CARE Score 4   Lower Body Dressing   Type of Assistance Needed Verbal cues; Incidental touching   Physical Assistance Level No physical assistance   Comment Able to don/doff pants with inc time without use of reacher this session, CGA while in stance and cues for line mgmt  Lower Body Dressing CARE Score 4   Putting On/Taking Off Footwear   Type of Assistance Needed Supervision; Adaptive equipment   Physical Assistance Level No physical assistance   Comment LHAE   Putting On/Taking Off Footwear CARE Score 4   Sit to Stand   Type of Assistance Needed Incidental touching; Adaptive equipment   Physical Assistance Level No physical assistance   Comment Inc time and cues for proper technique, continues to be limited by fatigue with lower height surfaces  Sit to Stand CARE Score 4   Bed-Chair Transfer   Type of Assistance Needed Incidental touching; Adaptive equipment   Physical Assistance Level No physical assistance   Comment CGA w RW   Chair/Bed-to-Chair Transfer CARE Score 4   Cognition   Overall Cognitive Status WFL   Arousal/Participation Cooperative   Attention Attends with cues to redirect   Orientation Level Oriented X4   Memory Within functional limits   Following Commands Follows one step commands with increased time or repetition   Activity Tolerance   Activity Tolerance Patient limited by fatigue   Assessment   Treatment Assessment Pt participated in skilled OT services with focus on family training with pt's wife focusing on functional txfers and ADL retraining  Pt continues to fluctuate based on levels of fatigue/pain  He continues to require inc time for all task completion  He reports intermittent feelings of dizziness which pass quickly with deep breathing and inc time with position changes  He remains limited by dec act julia, dec endurance, dec strength  Pt will continue to benefit from skilled OT services with focus on standing julia/balance, functional txfers, act julia, endurance  Ongoing training with pt's wife to occur Wednesday and Friday of this week  OT Family training done with: Pt's wife Vika  Assessment of family training Very supportive wife who is receptive to recommendations and very engaged throughout with G questions  Prognosis Fair   Problem List Decreased strength;Decreased endurance;Decreased mobility; Impaired balance;Orthopedic restrictions;Pain   Plan   Treatment/Interventions ADL retraining;Functional transfer training; Therapeutic exercise; Endurance training;Cognitive reorientation;Patient/family training;Equipment eval/education; Bed mobility; Compensatory technique education   Progress Progressing toward goals   Recommendation   OT Discharge Recommendation Home with home health rehabilitation   OT Therapy Minutes   OT Time In 1000   OT Time Out 1108   OT Total Time (minutes) 68   OT Mode of treatment - Individual (minutes) 68   OT Mode of treatment - Concurrent (minutes) 0   OT Mode of treatment - Group (minutes) 0   OT Mode of treatment - Co-treat (minutes) 0   OT Mode of Treatment - Total time(minutes) 68 minutes   OT Cumulative Minutes 1193   Therapy Time missed   Time missed?  No

## 2023-01-02 NOTE — NURSING NOTE
RN reviewed ostomy care w/ pt and wife  Reports he had a similar bag in the past and feels comfortable  RN also demonstrated wound care to ABD  Wife felt okay with this - will need to reinforce teaching  RN did not review Drains beyond demonstrating how to disconnect bulbs  Wife wanted to, but RN encouraged to focus on one task at a time and not to focus on drains yet as drain situation may change after tomorrow

## 2023-01-02 NOTE — PLAN OF CARE
Problem: Prexisting or High Potential for Compromised Skin Integrity  Goal: Skin integrity is maintained or improved  Description: INTERVENTIONS:  - Identify patients at risk for skin breakdown  - Assess and monitor skin integrity  - Assess and monitor nutrition and hydration status  - Monitor labs   - Assess for incontinence   - Turn and reposition patient  - Assist with mobility/ambulation  - Relieve pressure over bony prominences  - Avoid friction and shearing  - Provide appropriate hygiene as needed including keeping skin clean and dry  - Evaluate need for skin moisturizer/barrier cream  - Collaborate with interdisciplinary team   - Patient/family teaching  - Consider wound care consult   Outcome: Progressing     Problem: Potential for Falls  Goal: Patient will remain free of falls  Description: INTERVENTIONS:  - Educate patient/family on patient safety including physical limitations  - Instruct patient to call for assistance with activity   - Consult OT/PT to assist with strengthening/mobility   - Keep Call bell within reach  - Keep bed low and locked with side rails adjusted as appropriate  - Keep care items and personal belongings within reach  - Initiate and maintain comfort rounds  - Make Fall Risk Sign visible to staff  - Offer Toileting every Hours, in advance of need  - Initiate/Maintain arm  - Obtain necessary fall risk management equipment:  - Apply yellow socks and bracelet for high fall risk patients  - Consider moving patient to room near nurses station  Outcome: Progressing     Problem: PAIN - ADULT  Goal: Verbalizes/displays adequate comfort level or baseline comfort level  Description: Interventions:  - Encourage patient to monitor pain and request assistance  - Assess pain using appropriate pain scale  - Administer analgesics based on type and severity of pain and evaluate response  - Implement non-pharmacological measures as appropriate and evaluate response  - Consider cultural and social influences on pain and pain management  - Notify physician/advanced practitioner if interventions unsuccessful or patient reports new pain  Outcome: Progressing     Problem: INFECTION - ADULT  Goal: Absence or prevention of progression during hospitalization  Description: INTERVENTIONS:  - Assess and monitor for signs and symptoms of infection  - Monitor lab/diagnostic results  - Monitor all insertion sites, i e  indwelling lines, tubes, and drains  - Monitor endotracheal if appropriate and nasal secretions for changes in amount and color  - [place] appropriate cooling/warming therapies per order  - Administer medications as ordered  - Instruct and encourage patient and family to use good hand hygiene technique  - Identify and instruct in appropriate isolation precautions for identified infection/condition  Outcome: Progressing     Problem: SAFETY ADULT  Goal: Patient will remain free of falls  Description: INTERVENTIONS:  - Educate patient/family on patient safety including physical limitations  - Instruct patient to call for assistance with activity   - Consult OT/PT to assist with strengthening/mobility   - Keep Call bell within reach  - Keep bed low and locked with side rails adjusted as appropriate  - Keep care items and personal belongings within reach  - Initiate and maintain comfort rounds  - Make Fall Risk Sign visible to staff  - Offer Toileting every Hours, in advance of need  - Initiate/Maintain alarm  - Obtain necessary fall risk management equipment:   - Apply yellow socks and bracelet for high fall risk patients  - Consider moving patient to room near nurses station  Outcome: Progressing  Goal: Maintain or return to baseline ADL function  Description: INTERVENTIONS:  -  Assess patient's ability to carry out ADLs; assess patient's baseline for ADL function and identify physical deficits which impact ability to perform ADLs (bathing, care of mouth/teeth, toileting, grooming, dressing, etc )  - Assess/evaluate cause of self-care deficits   - Assess range of motion  - Assess patient's mobility; develop plan if impaired  - Assess patient's need for assistive devices and provide as appropriate  - Encourage maximum independence but intervene and supervise when necessary  - Involve family in performance of ADLs  - Assess for home care needs following discharge   - Consider OT consult to assist with ADL evaluation and planning for discharge  - Provide patient education as appropriate  Outcome: Progressing  Goal: Maintains/Returns to pre admission functional level  Description: INTERVENTIONS:  - Perform BMAT or MOVE assessment daily    - Set and communicate daily mobility goal to care team and patient/family/caregiver  - Collaborate with rehabilitation services on mobility goals if consulted  - Perform Range of [place] day  - Repos  - [place]  - Stand p  - Ambulate p  - Out of bed   - Out  - Out of bed for toileting  - Record patient progress and toleration of activity level   Outcome: Progressing     Problem: DISCHARGE PLANNING  Goal: Discharge to home or other facility with appropriate resources  Description: INTERVENTIONS:  - Identify barriers to discharge w/patient and caregiver  - Arrange for needed discharge resources and transportation as appropriate  - Identify discharge learning needs (meds, wound care, etc )  - Arrange for interpretive services to assist at discharge as needed  - Refer to Case Management Department for coordinating discharge planning if the patient needs post-hospital services based on physician/advanced practitioner order or complex needs related to functional status, cognitive ability, or social support system  Outcome: Progressing     Problem: Nutrition/Hydration-ADULT  Goal: Nutrient/Hydration intake appropriate for improving, restoring or maintaining nutritional needs  Description: Monitor and assess patient's nutrition/hydration status for malnutrition   Collaborate with interdisciplinary team and initiate plan and interventions as ordered  Monitor patient's weight and dietary intake as ordered or per policy  Utilize nutrition screening tool and intervene as necessary  Determine patient's food preferences and provide high-protein, high-caloric foods as appropriate       INTERVENTIONS:  - Monitor oral intake, urinary output, labs, and treatment plans  - Assess nutrition and hydration status and recommend course of action  - Evaluate amount of meals eaten  - Assist patient with eating if necessary   - Allow adequate time for meals  - Recommend/ encourage appropriate diets, oral nutritional supplements, and vitamin/mineral supplements  - Order, calculate, and assess calorie counts as needed  - Recommend, monitor, and adjust tube feedings and TPN/PPN based on assessed needs  - Assess need for intravenous fluids  - Provide specific nutrition/hydration education as appropriate  - Include patient/family/caregiver in decisions related to nutrition  Outcome: Progressing     Problem: MOBILITY - ADULT  Goal: Maintain or return to baseline ADL function  Description: INTERVENTIONS:  -  Assess patient's ability to carry out ADLs; assess patient's baseline for ADL function and identify physical deficits which impact ability to perform ADLs (bathing, care of mouth/teeth, toileting, grooming, dressing, etc )  - Assess/evaluate cause of self-care deficits   - Assess range of motion  - Assess patient's mobility; develop plan if impaired  - Assess patient's need for assistive devices and provide as appropriate  - Encourage maximum independence but intervene and supervise when necessary  - Involve family in performance of ADLs  - Assess for home care needs following discharge   - Consider OT consult to assist with ADL evaluation and planning for discharge  - Provide patient education as appropriate  Outcome: Progressing  Goal: Maintains/Returns to pre admission functional level  Description: INTERVENTIONS:  - Perform BMAT or MOVE assessment daily    - Set and communicate daily mobility goal to care team and patient/family/caregiver  - Collaborate with rehabilitation services on mobility goals if consulted  - Perform Range es a day  - Repositionrs    -  day    - Out of bed   - Out of bed   - Out of bed for toileting  - Record patient progress and toleration of activity level   Outcome: Progressing

## 2023-01-02 NOTE — PROGRESS NOTES
Progress Note - Infectious Disease   Dahiana Ast 61 y o  male MRN: [id number]  Unit/Bed#: -01 Encounter: [id number]      Impression/Recommendations:  Sepsis     Evolving 12/17:  WBC, HR   Suspect due to persistent left intra-abdominal infection in setting of complex history below, recently completed antibiotics   ROS and exam otherwise negative   Recent Flu/RSV/COVID PCR, CXR negative   Blood cultures negative   Improved with reinitiation of antibiotics, additional drain placement  Rec:  • Continue antibiotics as below  • Drains as below  • [address] [place] [address] Street  • Supportive care as per the primary service     Intra-abdominal abscess     In the setting complex surgical history as below   Initially developed 11/2022 accompanied by ESBL E  coli bacteremia   Status post exploratory laparotomy, right hemicolectomy, temporary abdominal closure 11/16; re-exploration, partial left colectomy, primary ileocolonic anastomosis with proximal diverting loop ileostomy, midline fascial closure on 11/17   More recently developed abscess in hepatectomy bed, also collection +/- leak at area of prior colonic anastamosis, 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   Status post IR drain into perihepatic collection 12/8   Cultures with ESBL E  Coli   Status post 7 days ertapenem after drain placement through 12/15   Developed recurrent sepsis and repeat CT 12/17 shows hepatic bed collection improved, persistent left intra-abdominal collection   Status post perigastic, perisplenic drains 12/20   Cultures again with ESBL E  Coli    Status post 14 days IV antibiotics, now on doxycycline  Rec:  • Continue PO doxycycline for now with possible plan for 28 days total  • Drain check per IR pending     Possible acute cholecystitis     Status post percutaneous cholecystostomy tube   Alk phos remains markedly elevated, possibly due to drain itself versus known intrahepatic metastases      Metastatic colon cancer   To liver, possible lung   Status post resection, chemotherapy, more recent hepatic resection and ablation, transverse colon resection      CKD   Baseline Cr 1 4       DM      Anemia   Status post multiple transfusions      Antibiotics:  Doxycycline #  Antibiotics #17    Subjective:  Patient seen on AM rounds  Sleeping and not awakened  No events noted overnight  24 Hour Events:  No documented fevers, chills, sweats, nausea, vomiting, or diarrhea  Objective:  Vitals:  Temp:  [98 2 °F (36 8 °C)-99 4 °F (37 4 °C)] 98 3 °F (36 8 °C)  HR:  [] 94  Resp:  [17-18] 18  BP: (148-151)/(76-84) 151/84  SpO2:  [96 %-98 %] 97 %  Temp (24hrs), Av 6 °F (37 °C), Min:98 2 °F (36 8 °C), Max:99 4 °F (37 4 °C)  Current: Temperature: 98 3 °F (36 8 °C)    Physical Exam:   General:  No acute distress  Psychiatric:  Awake and alert  Pulmonary:  Normal respiratory excursion without accessory muscle use  Abdomen:  Soft, nontender  Extremities:  No edema  Skin:  No rashes    Lab Results:  I have personally reviewed pertinent labs  Results from last 7 days   Lab Units 22  0639 22  0526 22  1522   POTASSIUM mmol/L 4 1 3 8 3 9   CHLORIDE mmol/L 112* 112* 110*   CO2 mmol/L 21 21 17*   BUN mg/dL 20 21 23   CREATININE mg/dL 1 23 1 21 1 54*   EGFR ml/min/1 73sq m 64 65 49   CALCIUM mg/dL 9 0 8 9 8 8   AST U/L 46* 46* 51*   ALT U/L 16 15 18   ALK PHOS U/L 1,066* 952* 1,072*     Results from last 7 days   Lab Units 23  0602 22  0458 22  0639   WBC Thousand/uL 12 67* 10 91* 10 15   HEMOGLOBIN g/dL 8 1* 8 0* 7 0*   PLATELETS Thousands/uL 620* 600* 501*           Imaging Studies:   I have personally reviewed pertinent imaging study reports and images in PACS  EKG, Pathology, and Other Studies:   I have personally reviewed pertinent reports

## 2023-01-02 NOTE — PROGRESS NOTES
01/02/23 1340   Pain Assessment   Pain Assessment Tool 0-10   Pain Score No Pain   Restrictions/Precautions   Precautions Fall Risk;Contact/isolation;Multiple lines  (VICTORINO drain, ileostomy, IR drain)   Weight Bearing Restrictions No   ROM Restrictions No   Grooming   Findings Schedule did not permit for hair washing in AM session and pt specifically requests to complete this session  Pt was able to complete grooming at an overall Supervision level with inc time while seated  Sit to Lying   Type of Assistance Needed Physical assistance   Physical Assistance Level 25% or less   Comment HOB elevated and use of bed rails, required Ada for BLEs due to fatigue  Sit to Lying CARE Score 3   Sit to Stand   Type of Assistance Needed Incidental touching;Verbal cues   Physical Assistance Level No physical assistance   Sit to Stand CARE Score 4   Bed-Chair Transfer   Type of Assistance Needed Incidental touching; Adaptive equipment;Verbal cues   Physical Assistance Level No physical assistance   Comment CGA with RW   Chair/Bed-to-Chair Transfer CARE Score 4   Cognition   Overall Cognitive Status WFL   Arousal/Participation Cooperative   Attention Attends with cues to redirect   Orientation Level Oriented X4   Memory Within functional limits   Following Commands Follows one step commands with increased time or repetition   Activity Tolerance   Activity Tolerance Patient limited by fatigue   Assessment   Treatment Assessment Pt participated in skilled OT services with focus on bed mobility, grooming, and functional txfers  Pt more fatigued this afternoon post FT and therapies  Pt agreeable to light session at this time  Pt continues to be limited by dec act julia  Pt does require emotional support this session but was redirectable to task with inc time and distraction  Pt will continue to benefit from skilled OT services with focus on act julia, endurance, strengthening, and standing julia/balance     Prognosis Fair   Problem List Decreased strength;Decreased endurance;Decreased mobility; Impaired balance;Orthopedic restrictions;Pain   Plan   Treatment/Interventions ADL retraining;Functional transfer training; Therapeutic exercise; Endurance training;Patient/family training;Equipment eval/education; Bed mobility; Compensatory technique education   Progress Progressing toward goals   Recommendation   OT Discharge Recommendation Home with home health rehabilitation   OT Therapy Minutes   OT Time In 1340   OT Time Out 1410   OT Total Time (minutes) 30   OT Mode of treatment - Individual (minutes) 30   OT Mode of treatment - Concurrent (minutes) 0   OT Mode of treatment - Group (minutes) 0   OT Mode of treatment - Co-treat (minutes) 0   OT Mode of Treatment - Total time(minutes) 30 minutes   OT Cumulative Minutes 7507   Therapy Time missed   Time missed?  No

## 2023-01-02 NOTE — PROGRESS NOTES
Internal Medicine Progress Note  Patient: Emilee Mccartney  Age/sex: 61 y o  male  Medical Record #: [id number]      ASSESSMENT/PLAN: (Interval History)  Emilee Mccartney is seen and examined and management for following issues:    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  • s/p hepatic resection and reversal of colostomy on 11/7  • s/p right hemicolectomy with partial descending colectomy colocolonic anastomosis with loop ileostomy and mid line abdominal VAC placement 11/16  • Continue local wound care; WC following  • CT  Abd/pelvis 12/17 = loculated collection along splenic recess   Has perisplenic hilum collection as well --> to IR 12/20 for drain placement in both perigastric and perisplenic area and cx sent from both areas = Ecoli  • Flush drains with 10ml qd  • 1/1/23 VICTORINO drainage = 10/10/10 ml   • S-O following and Dr Marilin Camejo was in to see pt 1/2/23  • Tube check (incl perc alvina tube) for 1/3 AM     Acute cholecystitis  • s/p percutaneous tube placement 12/8 with tube check on 12/12, 12/14  • GI saw due to alk phos elevation = felt 2/2 infiltrative disease rather than focal biliary obstruction   AMA was negative     • Alkaline phosphatase isoenzyme sent 12/17 (48% liver/52% bone) = GI  recommended continuing to monitor his LFTs and defer to surgical oncology for further w/u and plan   Last 3 APs were [phone]  Kimberly Arias recheck 1/3  • TB improving = to consider MRCP if total bili trends back up but was normal 12/30  • Alvina tube = 20 ml 1/1/23  Yenny Brannon still has dark blood in it but seems to slowly becoming less  • For perc alvina tube check 1/3  • Did discuss alk phos levels with Dr Oliva Velazquez 1/1/23 = stable but he suggested a cholangiogram be done with the tube check  I did TT Dr True Stroud who was on for IR so the message gets to them for 1/3     Bacteremia/abdominal abscess  • ID following   • s/p Ertapenem and now to switch to doxycycline per ID as of 12/30  • Blood cultures negative    • CT abdomen and pelvis as above  • Says nausea since starting Doxy on 12/30  Lasts 1-2 hrs and isnt able to eat lunch  D/W Dr Sharyle Opal and reassess 1/3/23 after tube check  • Has been using Zofran prn but will schedule it for 7a/7p so he gets it a few hrs before the Doxy dose that is being given at 9a/9p     Hypertension  • On Norvasc 5mg BID  • stable     Diabetes mellitus  • On Lantus 10U qhs/Lispro 6U TID  • Continue DM diet and QID Accuchecks/SSI  • Increased Lantus to 10U qhs from 6U to start 12/31/22  • Eating/meal completion is erratic and therefore BSs difficult     • May have to consider stopping Lispro WM and increasing coverage scale instead     Acute on chronic renal failure  • Stage III; baseline 1 2-1 4  • Lisinopril currently on hold  • s/p NSS IVF with improvement of creat =  back down to 1 2 12/30  • BMP 1/3     Anemia  • Multifactorial due to recent infection, surgery, CKD stage III  • Transfused on 12/15 for hemoglobin 7 3 and 12/16/22 for hgb of 8   • No transfusion for now per pt request but agreeable if it drops below 7  • Hemoglobin down to 7 on 12/30 and transfuse one unit PRBCs  with repeat hemoglobin 8  • Hemoglobin today is stable at 8 1     Atypical chest pain  • With elevation in troponin/EKG changes on acute side  • Cardiology saw then and cleared pt for discharge  • Did not recommend any further work up  • No further chest pain     Situational depression  • Neuropsychology consulted  • Patient not interested in medications at this point in time     Malnutrition  • Recommend dietician consultation to optimize wound healing  • Glucerna makes him nauseated  • On 12/15, ordered double protein     Pleural effusion  • CXR on 12/14 showed small left pleural effusion and was suspect for CHF  • ECHO 11/9/22 = LVEF 55%, unable to assess diastolic function, mild TR     L sided pleuritic pain  • located near drain site and per patient only occurs at night  • No evidence of infection        Discharge date:  1/9/23          The above assessment and plan was reviewed and updated as determined by my evaluation of the patient on 1/2/2023      Labs:   Results from last 7 days   Lab Units 01/02/23  0602 12/31/22  0458   WBC Thousand/uL 12 67* 10 91*   HEMOGLOBIN g/dL 8 1* 8 0*   HEMATOCRIT % 26 9* 27 5*   PLATELETS Thousands/uL 620* 600*     Results from last 7 days   Lab Units 12/30/22  0639 12/28/22  0526   SODIUM mmol/L 139 139   POTASSIUM mmol/L 4 1 3 8   CHLORIDE mmol/L 112* 112*   CO2 mmol/L 21 21   BUN mg/dL 20 21   CREATININE mg/dL 1 23 1 21   CALCIUM mg/dL 9 0 8 9             Results from last 7 days   Lab Units 01/02/23  0609 01/01/23  2030 01/01/23  1534   POC GLUCOSE mg/dl 109 139 116       Review of Scheduled Meds:  Current Facility-Administered Medications   Medication Dose Route Frequency Provider Last Rate   • acetaminophen  975 mg Oral Q8H [address] GAURANG Keith     • amLODIPine  5 mg Oral BID GAURANG Keith     • aspirin  81 mg Oral Daily GAURANG Keith     • atorvastatin  40 mg Oral Daily GAURANG Keith     • calcium carbonate  500 mg Oral BID PRN GAURANG Canas     • cholecalciferol  400 Units Oral Daily GAURANG Keith     • collagenase   Topical Daily Ankit Hampton MD     • doxycycline hyclate  100 mg Oral Q12H [address] Ankit Hampton MD     • heparin (porcine)  5,000 Units Subcutaneous Q8H [address] GAURANG Keith     • insulin glargine  10 Units Subcutaneous HS GAURANG Joaquin     • insulin lispro  1-5 Units Subcutaneous HS GAURANG Keith     • insulin lispro  1-6 Units Subcutaneous TID AC GAURANG Keith     • insulin lispro  6 Units Subcutaneous TID With Meals GAURANG Joaquin     • iohexol  50 mL Oral 90 min pre-procedure Nando Frost MD     • melatonin  3 mg Oral HS GAURANG Keith     • methocarbamol  500 mg Oral BID GAURANG Canas     • multivitamin-minerals  1 tablet Oral Daily GAURANG Keith     • ondansetron 4 mg Oral Q6H PRN GAURANG Borges     • oxyCODONE  10 mg Oral Q6H PRN GAURANG Borges     • oxyCODONE  2 5 mg Oral Q3H PRN GAURANG Borges     • oxyCODONE  5 mg Oral Q6H PRN GAURANG Keith     • pantoprazole  40 mg Oral BID AC GAURANG Keith     • simethicone  80 mg Oral 4x Daily (with meals and at bedtime) GAURANG Borges     • tamsulosin  0 4 mg Oral Daily With Dinner GAURANG Borges         Subjective/ HPI: Patient seen and examined  Patients overnight issues or events were reviewed with nursing or staff during rounds or morning huddle session  New or overnight issues include the following:     No new or overnight issues  Offers no complaints    ROS:   A 10 point ROS was performed; negative except as noted above  *Labs /Radiology studies reviewed  *Medications reviewed and reconciled as needed  *Please refer to order section for additional ordered labs studies  *Case discussed with primary attending during morning huddle case rounds    Physical Examination:  Vitals:   Vitals:    01/01/23 0643 01/01/23 1352 01/01/23 2039 01/02/23 0555   BP: 145/75 148/76 149/78 151/84   BP Location: Right arm Left arm Left arm Right arm   Pulse: 73 90 (!) 108 94   Resp: 18 17 18 18   Temp: 98 °F (36 7 °C) 98 2 °F (36 8 °C) 99 4 °F (37 4 °C) 98 3 °F (36 8 °C)   TempSrc: Oral Oral Oral Oral   SpO2: 97% 96% 98% 97%   Weight:       Height:           General Appearance: no distress, conversive  HEENT:  External ear normal   Nose normal w/o drainage  Mucous membranes are moist  Oropharynx is clear  Conjunctiva clear w/o icterus or redness  Neck:  Supple, normal ROM  Lungs: BBS without crackles/wheeze/rhonchi; respirations unlabored with normal inspiratory/expiratory effort  No retractions noted  On RA  CV: regular rate and rhythm; no rubs/murmurs/gallops, PMI normal   ABD: Abdomen is soft  Bowel sounds all quadrants  Nontender with no distention      EXT: no edema  Skin: normal turgor, normal texture, no rashes  Psych: affect normal, mood normal  Neuro: AAO      The above physical exam was reviewed and updated as determined by my evaluation of the patient on 1/2/2023  Invasive Devices     Central Venous Catheter Line  Duration           Port A Cath 03/10/22 Right Chest 297 days          Drain  Duration           Ileostomy LUQ 45 days    Abscess Drain Abdomen 24 days    Cholecystostomy Tube 20 days    Abscess Drain Abdomen 12 days    Abscess Drain Back 12 days                   VTE Pharmacologic Prophylaxis: Heparin  Code Status: Level 1 - Full Code  Current Length of Stay: 19 day(s)      Total time spent:  30 minutes with more than 50% spent counseling/coordinating care  Counseling includes discussion with patient re: progress  and discussion with patient of his/her current medical state/information  Coordination of patient's care was performed in conjunction with primary service  Time invested included review of patient's labs, vitals, and management of their comorbidities with continued monitoring  In addition, this patient was discussed with medical team including physician and advanced extenders  The care of the patient was extensively discussed and appropriate treatment plan was formulated unique for this patient  Medical decision making for the day was made by supervising physician unless otherwise noted in their attestation statement  ** Please Note:  voice to text software may have been used in the creation of this document   Although proof errors in transcription or interpretation are a potential of such software**

## 2023-01-02 NOTE — PLAN OF CARE
Problem: Prexisting or High Potential for Compromised Skin Integrity  Goal: Skin integrity is maintained or improved  Description: INTERVENTIONS:  - Identify patients at risk for skin breakdown  - Assess and monitor skin integrity  - Assess and monitor nutrition and hydration status  - Monitor labs   - Assess for incontinence   - Turn and reposition patient  - Assist with mobility/ambulation  - Relieve pressure over bony prominences  - Avoid friction and shearing  - Provide appropriate hygiene as needed including keeping skin clean and dry  - Evaluate need for skin moisturizer/barrier cream  - Collaborate with interdisciplinary team   - Patient/family teaching  - Consider wound care consult   Outcome: Progressing     Problem: Potential for Falls  Goal: Patient will remain free of falls  Description: INTERVENTIONS:  - Educate patient/family on patient safety including physical limitations  - Instruct patient to call for assistance with activity   - Consult OT/PT to assist with strengthening/mobility   - Keep Call bell within reach  - Keep bed low and locked with side rails adjusted as appropriate  - Keep care items and personal belongings within reach  - Initiate and maintain comfort rounds  - Make Fall Risk Sign visible to staff  - Offer Toileting every 2 Hours, in advance of need  - Initiate/Maintain bed alarm  - Apply yellow socks and bracelet for high fall risk patients  - Consider moving patient to room near nurses station  Outcome: Progressing     Problem: PAIN - ADULT  Goal: Verbalizes/displays adequate comfort level or baseline comfort level  Description: Interventions:  - Encourage patient to monitor pain and request assistance  - Assess pain using appropriate pain scale  - Administer analgesics based on type and severity of pain and evaluate response  - Implement non-pharmacological measures as appropriate and evaluate response  - Consider cultural and social influences on pain and pain management  - Notify physician/advanced practitioner if interventions unsuccessful or patient reports new pain  Outcome: Progressing     Problem: INFECTION - ADULT  Goal: Absence or prevention of progression during hospitalization  Description: INTERVENTIONS:  - Assess and monitor for signs and symptoms of infection  - Monitor lab/diagnostic results  - Monitor all insertion sites, i e  indwelling lines, tubes, and drains  - Monitor endotracheal if appropriate and nasal secretions for changes in amount and color  - [place] appropriate cooling/warming therapies per order  - Administer medications as ordered  - Instruct and encourage patient and family to use good hand hygiene technique  - Identify and instruct in appropriate isolation precautions for identified infection/condition  Outcome: Progressing

## 2023-01-03 ENCOUNTER — APPOINTMENT (INPATIENT)
Dept: RADIOLOGY | Facility: HOSPITAL | Age: 58
End: 2023-01-03

## 2023-01-03 LAB
ALBUMIN SERPL BCP-MCNC: 1.6 G/DL (ref 3.5–5)
ALP SERPL-CCNC: 1315 U/L (ref 46–116)
ALT SERPL W P-5'-P-CCNC: 14 U/L (ref 12–78)
ANION GAP SERPL CALCULATED.3IONS-SCNC: 9 MMOL/L (ref 4–13)
AST SERPL W P-5'-P-CCNC: 54 U/L (ref 5–45)
ATRIAL RATE: 100 BPM
BILIRUB SERPL-MCNC: 0.88 MG/DL (ref 0.2–1)
BUN SERPL-MCNC: 20 MG/DL (ref 5–25)
CALCIUM ALBUM COR SERPL-MCNC: 10.4 MG/DL (ref 8.3–10.1)
CALCIUM SERPL-MCNC: 8.5 MG/DL (ref 8.3–10.1)
CHLORIDE SERPL-SCNC: 108 MMOL/L (ref 96–108)
CO2 SERPL-SCNC: 19 MMOL/L (ref 21–32)
CREAT SERPL-MCNC: 1.3 MG/DL (ref 0.6–1.3)
GFR SERPL CREATININE-BSD FRML MDRD: 60 ML/MIN/1.73SQ M
GGT SERPL-CCNC: 1992 U/L (ref 5–85)
GLUCOSE P FAST SERPL-MCNC: 139 MG/DL (ref 65–99)
GLUCOSE SERPL-MCNC: 128 MG/DL (ref 65–140)
GLUCOSE SERPL-MCNC: 139 MG/DL (ref 65–140)
GLUCOSE SERPL-MCNC: 147 MG/DL (ref 65–140)
GLUCOSE SERPL-MCNC: 226 MG/DL (ref 65–140)
GLUCOSE SERPL-MCNC: 230 MG/DL (ref 65–140)
P AXIS: 29 DEGREES
POTASSIUM SERPL-SCNC: 4.1 MMOL/L (ref 3.5–5.3)
PR INTERVAL: 164 MS
PROT SERPL-MCNC: 8.1 G/DL (ref 6.4–8.4)
QRS AXIS: 31 DEGREES
QRSD INTERVAL: 134 MS
QT INTERVAL: 372 MS
QTC INTERVAL: 479 MS
SODIUM SERPL-SCNC: 136 MMOL/L (ref 135–147)
T WAVE AXIS: 6 DEGREES
VENTRICULAR RATE: 100 BPM

## 2023-01-03 PROCEDURE — BW111ZZ FLUOROSCOPY OF ABDOMEN AND PELVIS USING LOW OSMOLAR CONTRAST: ICD-10-PCS | Performed by: RADIOLOGY

## 2023-01-03 RX ADMIN — ONDANSETRON 4 MG: 4 TABLET, ORALLY DISINTEGRATING ORAL at 06:25

## 2023-01-03 RX ADMIN — SIMETHICONE 80 MG: 80 TABLET, CHEWABLE ORAL at 18:32

## 2023-01-03 RX ADMIN — ACETAMINOPHEN 975 MG: 325 TABLET, FILM COATED ORAL at 21:24

## 2023-01-03 RX ADMIN — TAMSULOSIN HYDROCHLORIDE 0.4 MG: 0.4 CAPSULE ORAL at 18:38

## 2023-01-03 RX ADMIN — HEPARIN SODIUM 5000 UNITS: 5000 INJECTION INTRAVENOUS; SUBCUTANEOUS at 06:26

## 2023-01-03 RX ADMIN — ATORVASTATIN CALCIUM 40 MG: 40 TABLET, FILM COATED ORAL at 08:15

## 2023-01-03 RX ADMIN — INSULIN LISPRO 6 UNITS: 100 INJECTION, SOLUTION INTRAVENOUS; SUBCUTANEOUS at 18:07

## 2023-01-03 RX ADMIN — ONDANSETRON 4 MG: 4 TABLET, ORALLY DISINTEGRATING ORAL at 18:32

## 2023-01-03 RX ADMIN — Medication 1 TABLET: at 08:15

## 2023-01-03 RX ADMIN — ERTAPENEM SODIUM 1000 MG: 1 INJECTION, POWDER, LYOPHILIZED, FOR SOLUTION INTRAMUSCULAR; INTRAVENOUS at 21:14

## 2023-01-03 RX ADMIN — OXYCODONE HYDROCHLORIDE 5 MG: 5 TABLET ORAL at 21:31

## 2023-01-03 RX ADMIN — ONDANSETRON 4 MG: 4 TABLET, ORALLY DISINTEGRATING ORAL at 21:07

## 2023-01-03 RX ADMIN — ACETAMINOPHEN 975 MG: 325 TABLET, FILM COATED ORAL at 06:25

## 2023-01-03 RX ADMIN — AMLODIPINE BESYLATE 5 MG: 5 TABLET ORAL at 18:44

## 2023-01-03 RX ADMIN — DOXYCYCLINE 100 MG: 100 CAPSULE ORAL at 08:14

## 2023-01-03 RX ADMIN — INSULIN GLARGINE 10 UNITS: 100 INJECTION, SOLUTION SUBCUTANEOUS at 21:20

## 2023-01-03 RX ADMIN — SIMETHICONE 80 MG: 80 TABLET, CHEWABLE ORAL at 08:14

## 2023-01-03 RX ADMIN — PANTOPRAZOLE SODIUM 40 MG: 40 TABLET, DELAYED RELEASE ORAL at 18:31

## 2023-01-03 RX ADMIN — HEPARIN SODIUM 5000 UNITS: 5000 INJECTION INTRAVENOUS; SUBCUTANEOUS at 14:04

## 2023-01-03 RX ADMIN — CHOLECALCIFEROL TAB 10 MCG (400 UNIT) 400 UNITS: 10 TAB at 08:14

## 2023-01-03 RX ADMIN — COLLAGENASE SANTYL: 250 OINTMENT TOPICAL at 08:39

## 2023-01-03 RX ADMIN — CALCIUM CARBONATE (ANTACID) CHEW TAB 500 MG 500 MG: 500 CHEW TAB at 14:06

## 2023-01-03 RX ADMIN — AMLODIPINE BESYLATE 5 MG: 5 TABLET ORAL at 08:15

## 2023-01-03 RX ADMIN — PANTOPRAZOLE SODIUM 40 MG: 40 TABLET, DELAYED RELEASE ORAL at 06:26

## 2023-01-03 RX ADMIN — IOHEXOL 8 ML: 240 INJECTION, SOLUTION INTRATHECAL; INTRAVASCULAR; INTRAVENOUS; ORAL at 09:25

## 2023-01-03 RX ADMIN — ASPIRIN 81 MG CHEWABLE TABLET 81 MG: 81 TABLET CHEWABLE at 08:15

## 2023-01-03 RX ADMIN — HEPARIN SODIUM 5000 UNITS: 5000 INJECTION INTRAVENOUS; SUBCUTANEOUS at 21:19

## 2023-01-03 RX ADMIN — INSULIN LISPRO 6 UNITS: 100 INJECTION, SOLUTION INTRAVENOUS; SUBCUTANEOUS at 11:36

## 2023-01-03 RX ADMIN — INSULIN LISPRO 3 UNITS: 100 INJECTION, SOLUTION INTRAVENOUS; SUBCUTANEOUS at 11:37

## 2023-01-03 RX ADMIN — INSULIN LISPRO 2 UNITS: 100 INJECTION, SOLUTION INTRAVENOUS; SUBCUTANEOUS at 18:08

## 2023-01-03 RX ADMIN — SIMETHICONE 80 MG: 80 TABLET, CHEWABLE ORAL at 11:36

## 2023-01-03 RX ADMIN — MELATONIN TAB 3 MG 3 MG: 3 TAB at 21:20

## 2023-01-03 RX ADMIN — INSULIN LISPRO 3 UNITS: 100 INJECTION, SOLUTION INTRAVENOUS; SUBCUTANEOUS at 08:29

## 2023-01-03 RX ADMIN — SIMETHICONE 80 MG: 80 TABLET, CHEWABLE ORAL at 21:20

## 2023-01-03 RX ADMIN — ACETAMINOPHEN 975 MG: 325 TABLET, FILM COATED ORAL at 14:03

## 2023-01-03 RX ADMIN — METHOCARBAMOL 500 MG: 500 TABLET ORAL at 08:15

## 2023-01-03 RX ADMIN — METHOCARBAMOL 500 MG: 500 TABLET ORAL at 18:32

## 2023-01-03 NOTE — PROGRESS NOTES
01/03/23 1030   Pain Assessment   Pain Assessment Tool 0-10   Pain Score No Pain   Restrictions/Precautions   Precautions Fall Risk;Contact/isolation;Multiple lines  (VICTORINO drain, ileostomy, IR drain)   Weight Bearing Restrictions No   ROM Restrictions No   Eating   Type of Assistance Needed Set-up / clean-up   Physical Assistance Level No physical assistance   Eating CARE Score 5   Oral Hygiene   Type of Assistance Needed Independent   Physical Assistance Level No physical assistance   Comment seated in w/c at sink   Oral Hygiene CARE Score 6   Shower/Bathe Self   Type of Assistance Needed Incidental touching; Adaptive equipment   Physical Assistance Level No physical assistance   Comment Pt demos inc ability to complete distal LE bathing via cross leg technique  Shower/Bathe Self CARE Score 4   Bathing   Assessed Bath Style Sponge Bath   Anticipated D/C Bath Style Sponge Bath   Able to [place] No   Able to [employer] Temperature Yes   Able to Wash/Rinse/Dry (body part) Left Arm;Right Arm;L Upper Leg;R Upper Leg;L Lower Leg/Foot;R Lower Leg/Foot;Chest;Abdomen;Perineal Area; Buttocks   Limitations Noted in Balance; Endurance;Strength   Positioning Seated;Standing   Tub/Shower Transfer   Reason Not Assessed Medical   Upper Body Dressing   Type of Assistance Needed Supervision   Physical Assistance Level No physical assistance   Comment Pt able to complete all line mgmt, fasteners, and donning/doffing of [place] shirt  Upper Body Dressing CARE Score 4   Lower Body Dressing   Type of Assistance Needed Incidental touching   Physical Assistance Level No physical assistance   Lower Body Dressing CARE Score 4   Putting On/Taking Off Footwear   Type of Assistance Needed Supervision; Adaptive equipment   Physical Assistance Level No physical assistance   Comment Able to doff socks via cross leg technique and no need for LHAE   Continues to be limited with donning and would benefit from sock aide- however, pt states wife can complete this if need and can primarily use slip on slippers at time of d/c  Putting On/Taking Off Footwear CARE Score 4   Sit to Lying   Type of Assistance Needed Incidental touching; Adaptive equipment   Physical Assistance Level No physical assistance   Comment Pt is able to complete at a CGA level with use of bed rails and functions  Sit to Lying CARE Score 4   Lying to Sitting on Side of Bed   Type of Assistance Needed Supervision; Adaptive equipment   Physical Assistance Level No physical assistance   Comment with HOB slightly elevated and use of rails  Lying to Sitting on Side of Bed CARE Score 4   Sit to Stand   Type of Assistance Needed Incidental touching; Adaptive equipment   Physical Assistance Level No physical assistance   Sit to Stand CARE Score 4   Bed-Chair Transfer   Type of Assistance Needed Incidental touching; Adaptive equipment   Physical Assistance Level No physical assistance   Comment CGA with RW  Chair/Bed-to-Chair Transfer CARE Score 4   Toileting Hygiene   Type of Assistance Needed Set-up / clean-up   Physical Assistance Level No physical assistance   Comment Pt demos inc ability to manage ostomy supplies, opening, and cleansing with set up/clean up  Completed emptying of ostomy into canister  Toileting Hygiene CARE Score 5   Exercise Tools   Other Exercise Tool 1 Gross grasp and hook grasp completing 3 x 10 each with red therapy sponge to promote FMC/strength for inc independence with ADL tasks and fastener mgmt     Cognition   Overall Cognitive Status WFL   Arousal/Participation Cooperative   Attention Attends with cues to redirect   Orientation Level Oriented X4   Memory Within functional limits   Following Commands Follows one step commands with increased time or repetition   Activity Tolerance   Activity Tolerance Patient limited by fatigue   Assessment   Treatment Assessment Pt participated in skilled OT services with focus on ADL retraining, bed mobility, and hand strengthening  Pt continues to be limited by dec act julia, dec endurance, dec strength, dec standing julia  Pt making G progress with ostomy mgmt, ADL tasks and txfers  Progressing to mostly CGA/CS with RW  Pt will continue to benefit from skilled OT services with focus on above mentioned deficits to inc safety and independence with I/ADL tasks  Prognosis Fair   Problem List Decreased strength;Decreased endurance;Decreased mobility; Impaired balance;Orthopedic restrictions;Pain   Plan   Treatment/Interventions ADL retraining;Functional transfer training; Therapeutic exercise; Endurance training;Patient/family training;Equipment eval/education; Bed mobility; Compensatory technique education   Progress Progressing toward goals   Recommendation   OT Discharge Recommendation Home with home health rehabilitation   OT Therapy Minutes   OT Time In 1030   OT Time Out 1130   OT Total Time (minutes) 60   OT Mode of treatment - Individual (minutes) 60   OT Mode of treatment - Concurrent (minutes) 0   OT Mode of treatment - Group (minutes) 0   OT Mode of treatment - Co-treat (minutes) 0   OT Mode of Treatment - Total time(minutes) 60 minutes   OT Cumulative Minutes 1362   Therapy Time missed   Time missed?  No

## 2023-01-03 NOTE — PLAN OF CARE
Problem: Prexisting or High Potential for Compromised Skin Integrity  Goal: Skin integrity is maintained or improved  Description: INTERVENTIONS:  - Identify patients at risk for skin breakdown  - Assess and monitor skin integrity  - Assess and monitor nutrition and hydration status  - Monitor labs   - Assess for incontinence   - Turn and reposition patient  - Assist with mobility/ambulation  - Relieve pressure over bony prominences  - Avoid friction and shearing  - Provide appropriate hygiene as needed including keeping skin clean and dry  - Evaluate need for skin moisturizer/barrier cream  - Collaborate with interdisciplinary team   - Patient/family teaching  - Consider wound care consult   Outcome: Progressing     Problem: Potential for Falls  Goal: Patient will remain free of falls  Description: INTERVENTIONS:  - Educate patient/family on patient safety including physical limitations  - Instruct patient to call for assistance with activity   - Consult OT/PT to assist with strengthening/mobility   - Keep Call bell within reach  - Keep bed low and locked with side rails adjusted as appropriate  - Keep care items and personal belongings within reach  - Initiate and maintain comfort rounds  - Make Fall Risk Sign visible to staff  - Offer Toileting every 2 Hours, in advance of need  - Initiate/Maintain bed alarm  - Apply yellow socks and bracelet for high fall risk patients  - Consider moving patient to room near nurses station  Outcome: Progressing     Problem: PAIN - ADULT  Goal: Verbalizes/displays adequate comfort level or baseline comfort level  Description: Interventions:  - Encourage patient to monitor pain and request assistance  - Assess pain using appropriate pain scale  - Administer analgesics based on type and severity of pain and evaluate response  - Implement non-pharmacological measures as appropriate and evaluate response  - Consider cultural and social influences on pain and pain management  - Notify physician/advanced practitioner if interventions unsuccessful or patient reports new pain  Outcome: Progressing     Problem: INFECTION - ADULT  Goal: Absence or prevention of progression during hospitalization  Description: INTERVENTIONS:  - Assess and monitor for signs and symptoms of infection  - Monitor lab/diagnostic results  - Monitor all insertion sites, i e  indwelling lines, tubes, and drains  - Monitor endotracheal if appropriate and nasal secretions for changes in amount and color  - [place] appropriate cooling/warming therapies per order  - Administer medications as ordered  - Instruct and encourage patient and family to use good hand hygiene technique  - Identify and instruct in appropriate isolation precautions for identified infection/condition  Outcome: Progressing     Problem: SAFETY ADULT  Goal: Patient will remain free of falls  Description: INTERVENTIONS:  - Educate patient/family on patient safety including physical limitations  - Instruct patient to call for assistance with activity   - Consult OT/PT to assist with strengthening/mobility   - Keep Call bell within reach  - Keep bed low and locked with side rails adjusted as appropriate  - Keep care items and personal belongings within reach  - Initiate and maintain comfort rounds  - Make Fall Risk Sign visible to staff  - Offer Toileting every 2 Hours, in advance of need  - Initiate/Maintain bed alarm  - Apply yellow socks and bracelet for high fall risk patients  - Consider moving patient to room near nurses station  Outcome: Progressing  Goal: Maintain or return to baseline ADL function  Description: INTERVENTIONS:  -  Assess patient's ability to carry out ADLs; assess patient's baseline for ADL function and identify physical deficits which impact ability to perform ADLs (bathing, care of mouth/teeth, toileting, grooming, dressing, etc )  - Assess/evaluate cause of self-care deficits   - Assess range of motion  - Assess patient's mobility; develop plan if impaired  - Assess patient's need for assistive devices and provide as appropriate  - Encourage maximum independence but intervene and supervise when necessary  - Involve family in performance of ADLs  - Assess for home care needs following discharge   - Consider OT consult to assist with ADL evaluation and planning for discharge  - Provide patient education as appropriate  Outcome: Progressing  Goal: Maintains/Returns to pre admission functional level  Description: INTERVENTIONS:  - Perform BMAT or MOVE assessment daily    - Set and communicate daily mobility goal to care team and patient/family/caregiver  - Collaborate with rehabilitation services on mobility goals if consulted  - Perform Range of Motion 4 times a day  - Reposition patient every 2 hours    - Dangle patient 4 times a day  - Stand patient 4 times a day  - Ambulate patient 4 times a day  - Out of bed to chair 4 times a day   - Out of bed for meals 4 times a day  - Out of bed for toileting  - Record patient progress and toleration of activity level   Outcome: Progressing

## 2023-01-03 NOTE — PROGRESS NOTES
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  • s/p hepatic resection and reversal of colostomy on 11/7  • s/p right hemicolectomy with partial descending colectomy colocolonic anastomosis with loop ileostomy and mid line abdominal VAC placement 11/16  • Continue local wound care; WC following  • CT  Abd/pelvis 12/17 = loculated collection along splenic recess   Has perisplenic hilum collection as well --> to IR 12/20 for drain placement in both perigastric and perisplenic area and cx sent from both areas = Ecoli  • Flush drains with 10ml qd  • 1/1/23 VICTORINO drainage = 0ml  • S-O following and Dr Marybeth Couch was in to see pt 1/2/23  • Tube check (incl perc alvina tube) done today this AM     Acute cholecystitis  • s/p percutaneous tube placement 12/8 with tube check on 12/12, 12/14  • GI saw due to alk phos elevation = felt 2/2 infiltrative disease rather than focal biliary obstruction   AMA was negative     • Alkaline phosphatase isoenzyme sent 12/17 (48% liver/52% bone) = GI  recommended continuing to monitor his LFTs and defer to surgical oncology for further w/u and plan   Last 3 APs were [phone]  Forrestine Redder recheck 1/3  • TB improving = to consider MRCP if total bili trends back up but was normal 12/30  • Alvina tube = 20 ml 1/1/23  Lanette Barrientos still has dark blood in it but seems to slowly becoming less  • Perc alvina tube check done today 1/3     • Did discuss alk phos levels with Dr Brionna Petit 1/1/23 = stable but he suggested a cholangiogram be done with the tube check which was relayed to Dr Jimbo Zhang over the weekend --> wasn't done today = d/w GI and they said do at next tube check in 2 weeks but will drop a note    They also wanted a GGT now     Bacteremia/abdominal abscess  • ID following   • s/p Ertapenem was switched to Doxycycline per ID as of 12/30  • Blood cultures negative  • CT abdomen and pelvis as above  • + nausea since starting Doxy on 12/30   Lasts 1-2 hrs and was affecting appetite pt said  • On 1/2/23, changed Zofran prn to be given scheduled pre-Doxy = was not effective however  • Dr Jozef Artis changed back to Ertapenem today     Hypertension  • On Norvasc 5mg BID  • stable     Diabetes mellitus  • On Lantus 10U qhs/Lispro 6U TID  • Continue DM diet and QID Accuchecks/SSI  • Increased Lantus to 10U qhs from 6U to start 12/31/22  • Eating/meal completion is erratic and therefore BSs difficult  • May have to consider stopping Lispro WM and increasing coverage scale instead     Acute on chronic renal failure  • Stage III; baseline 1 2-1 4  • Lisinopril currently on hold  • s/p NSS IVF with improvement of creat =  back down to 1 2 12/30  • Stable at 1 3 now     Anemia  • Multifactorial due to recent infection, surgery, CKD stage III  • Transfused on 12/15, 12/16/22 and 12/30  • Hemoglobin 1/2/23 is stable at 8 1     Atypical chest pain  • With elevation in troponin/EKG changes on acute side  • Cardiology saw then and cleared pt for discharge  • Did not recommend any further work up  • No further chest pain     Situational depression  • Neuropsychology consulted  • Patient not interested in medications at this point in time     Malnutrition  • Glucerna makes him nauseated  • On 12/15, ordered double protein     Pleural effusion  • CXR on 12/14 showed small left pleural effusion and was suspect for CHF  • ECHO 11/9/22 = LVEF 55%, unable to assess diastolic function, mild TR     L sided pleuritic pain  • located near drain site and per patient only occurs at night  • No evidence of infection        Discharge date:  1/9/23          The above assessment and plan was reviewed and updated as determined by my evaluation of the patient on 1/3/2023      Labs:   Results from last 7 days Lab Units 01/02/23  0602 12/31/22  0458   WBC Thousand/uL 12 67* 10 91*   HEMOGLOBIN g/dL 8 1* 8 0*   HEMATOCRIT % 26 9* 27 5*   PLATELETS Thousands/uL 620* 600*     Results from last 7 days   Lab Units 01/03/23  0635 12/30/22  0639   SODIUM mmol/L 136 139   POTASSIUM mmol/L 4 1 4 1   CHLORIDE mmol/L 108 112*   CO2 mmol/L 19* 21   BUN mg/dL 20 20   CREATININE mg/dL 1 30 1 23   CALCIUM mg/dL 8 5 9 0             Results from last 7 days   Lab Units 01/03/23  0638 01/02/23 2002 01/02/23  1514   POC GLUCOSE mg/dl 128 122 127       Review of Scheduled Meds:  Current Facility-Administered Medications   Medication Dose Route Frequency Provider Last Rate   • acetaminophen  975 mg Oral Q8H [address] GAURANG Keith     • amLODIPine  5 mg Oral BID GAURANG Keith     • aspirin  81 mg Oral Daily GAURANG Keith     • atorvastatin  40 mg Oral Daily GAURANG Keith     • calcium carbonate  500 mg Oral BID PRN GAURANG Jeffers     • cholecalciferol  400 Units Oral Daily GAURANG Keith     • collagenase   Topical Daily Zeenat Ny MD     • doxycycline hyclate  100 mg Oral Q12H [address] Zeenat Ny MD     • heparin (porcine)  5,000 Units Subcutaneous Q8H [address] GAURANG Keith     • insulin glargine  10 Units Subcutaneous HS GAURANG Cardenas     • insulin lispro  1-5 Units Subcutaneous HS GAURANG Keith     • insulin lispro  1-6 Units Subcutaneous TID AC GAURANG Keith     • insulin lispro  6 Units Subcutaneous TID With Meals GAURANG Cardenas     • iohexol  50 mL Intravenous Once in imaging Cesar Ford MD     • melatonin  3 mg Oral HS GAURANG Keith     • methocarbamol  500 mg Oral BID GAURANG Jeffers     • multivitamin-minerals  1 tablet Oral Daily GAURANG Keith     • ondansetron  4 mg Oral Q6H PRN GAURANG Keith     • ondansetron  4 mg Oral BID [place], CRNP     • oxyCODONE  10 mg Oral Q6H PRN GAURANG Jeffers     • oxyCODONE  2 5 mg Oral Q3H PRN GAURANG Tobar     • oxyCODONE  5 mg Oral Q6H PRN GAURANG Keith     • pantoprazole  40 mg Oral BID AC GAURANG Keith     • simethicone  80 mg Oral 4x Daily (with meals and at bedtime) GAURANG Tobar     • tamsulosin  0 4 mg Oral Daily With Dinner GAURANG Tobar         Subjective/ HPI: Patient seen and examined  Patients overnight issues or events were reviewed with nursing or staff during rounds or morning huddle session  New or overnight issues include the following: To IR today for tube check = tubes to stay in and recheck 2 weeks  Offers no complaints    ROS:   A 10 point ROS was performed; negative except as noted above  *Labs /Radiology studies reviewed  *Medications reviewed and reconciled as needed  *Please refer to order section for additional ordered labs studies  *Case discussed with primary attending during morning huddle case rounds    Physical Examination:  Vitals:   Vitals:    01/02/23 1338 01/02/23 1704 01/02/23 1957 01/03/23 0500   BP: 124/79 151/85 151/83 142/78   BP Location: Left arm Left arm Left arm Left arm   Pulse: (!) 107  (!) 109 100   Resp: 18   17   Temp: 97 9 °F (36 6 °C)  99 9 °F (37 7 °C) 99 °F (37 2 °C)   TempSrc: Oral  Oral Oral   SpO2: 95%  94% 96%   Weight:       Height:           General Appearance: no distress, conversive  HEENT: PERRLA, conjuctiva normal; oropharynx clear; mucous membranes moist   Neck:  Supple, normal ROM  Lungs: CTA, normal respiratory effort, no retractions, expiratory effort normal  CV: regular rate and rhythm; no rubs/murmurs/gallops, PMI normal   ABD: soft; ND/NT; +BS  EXT: no edema  Skin: normal turgor, normal texture, no rashes  Psych: affect normal, mood normal  Neuro: AAO      The above physical exam was reviewed and updated as determined by my evaluation of the patient on 1/3/2023      Invasive Devices     Central Venous Catheter Line  Duration           Port A Cath 03/10/22 Right Chest 298 days          Drain  Duration           Ileostomy LUQ 46 days    Abscess Drain Abdomen 25 days    Cholecystostomy Tube 21 days    Abscess Drain Abdomen 13 days    Abscess Drain Back 13 days                   VTE Pharmacologic Prophylaxis: Heparin  Code Status: Level 1 - Full Code  Current Length of Stay: 20 day(s)      Total time spent:  30 minutes with more than 50% spent counseling/coordinating care  Counseling includes discussion with patient re: progress  and discussion with patient of his/her current medical state/information  Coordination of patient's care was performed in conjunction with primary service  Time invested included review of patient's labs, vitals, and management of their comorbidities with continued monitoring  In addition, this patient was discussed with medical team including physician and advanced extenders  The care of the patient was extensively discussed and appropriate treatment plan was formulated unique for this patient  Medical decision making for the day was made by supervising physician unless otherwise noted in their attestation statement  ** Please Note:  voice to text software may have been used in the creation of this document   Although proof errors in transcription or interpretation are a potential of such software**

## 2023-01-03 NOTE — QUICK NOTE
Surgical Oncology Quick Note    Patient seen and examined at bedside  Old dressing removed and photo uploaded to chart  Would with fibrinous exudate covering superior portion  Inferior portion healing well  No signs of infection  Mesalt placed back onto superior portion of the wound and then covered with an ABD pad and secured with tape  Patient tolerated without pain              Camden Mart MD  General Surgery   01/03/23

## 2023-01-03 NOTE — PLAN OF CARE
Problem: Prexisting or High Potential for Compromised Skin Integrity  Goal: Skin integrity is maintained or improved  Description: INTERVENTIONS:  - Identify patients at risk for skin breakdown  - Assess and monitor skin integrity  - Assess and monitor nutrition and hydration status  - Monitor labs   - Assess for incontinence   - Turn and reposition patient  - Assist with mobility/ambulation  - Relieve pressure over bony prominences  - Avoid friction and shearing  - Provide appropriate hygiene as needed including keeping skin clean and dry  - Evaluate need for skin moisturizer/barrier cream  - Collaborate with interdisciplinary team   - Patient/family teaching  - Consider wound care consult   Outcome: Progressing     Problem: Potential for Falls  Goal: Patient will remain free of falls  Description: INTERVENTIONS:  - Educate patient/family on patient safety including physical limitations  - Instruct patient to call for assistance with activity   - Consult OT/PT to assist with strengthening/mobility   - Keep Call bell within reach  - Keep bed low and locked with side rails adjusted as appropriate  - Keep care items and personal belongings within reach  - Initiate and maintain comfort rounds  - Make Fall Risk Sign visible to staff  - Offer Toileting every 2 Hours, in advance of need  - Initiate/Maintain bed/chair alarm  - Obtain necessary fall risk management equipment: non skid footwear  - Apply yellow socks and bracelet for high fall risk patients  - Consider moving patient to room near nurses station  Outcome: Progressing     Problem: PAIN - ADULT  Goal: Verbalizes/displays adequate comfort level or baseline comfort level  Description: Interventions:  - Encourage patient to monitor pain and request assistance  - Assess pain using appropriate pain scale  - Administer analgesics based on type and severity of pain and evaluate response  - Implement non-pharmacological measures as appropriate and evaluate response  - Consider cultural and social influences on pain and pain management  - Notify physician/advanced practitioner if interventions unsuccessful or patient reports new pain  Outcome: Progressing     Problem: INFECTION - ADULT  Goal: Absence or prevention of progression during hospitalization  Description: INTERVENTIONS:  - Assess and monitor for signs and symptoms of infection  - Monitor lab/diagnostic results  - Monitor all insertion sites, i e  indwelling lines, tubes, and drains  - Monitor endotracheal if appropriate and nasal secretions for changes in amount and color  - [place] appropriate cooling/warming therapies per order  - Administer medications as ordered  - Instruct and encourage patient and family to use good hand hygiene technique  - Identify and instruct in appropriate isolation precautions for identified infection/condition  Outcome: Progressing     Problem: SAFETY ADULT  Goal: Patient will remain free of falls  Description: INTERVENTIONS:  - Educate patient/family on patient safety including physical limitations  - Instruct patient to call for assistance with activity   - Consult OT/PT to assist with strengthening/mobility   - Keep Call bell within reach  - Keep bed low and locked with side rails adjusted as appropriate  - Keep care items and personal belongings within reach  - Initiate and maintain comfort rounds  - Make Fall Risk Sign visible to staff  - Offer Toileting every 2 Hours, in advance of need  - Initiate/Maintain bed/chair alarm  - Obtain necessary fall risk management equipment: non skid footwear  - Apply yellow socks and bracelet for high fall risk patients  - Consider moving patient to room near nurses station  Outcome: Progressing  Goal: Maintain or return to baseline ADL function  Description: INTERVENTIONS:  -  Assess patient's ability to carry out ADLs; assess patient's baseline for ADL function and identify physical deficits which impact ability to perform ADLs (bathing, care of mouth/teeth, toileting, grooming, dressing, etc )  - Assess/evaluate cause of self-care deficits   - Assess range of motion  - Assess patient's mobility; develop plan if impaired  - Assess patient's need for assistive devices and provide as appropriate  - Encourage maximum independence but intervene and supervise when necessary  - Involve family in performance of ADLs  - Assess for home care needs following discharge   - Consider OT consult to assist with ADL evaluation and planning for discharge  - Provide patient education as appropriate  Outcome: Progressing  Goal: Maintains/Returns to pre admission functional level  Description: INTERVENTIONS:  - Perform BMAT or MOVE assessment daily    - Set and communicate daily mobility goal to care team and patient/family/caregiver  - Collaborate with rehabilitation services on mobility goals if consulted  - Perform Range of Motion 3 times a day  - Reposition patient every 2 hours    - Dangle patient 3 times a day  - Stand patient 3 times a day  - Ambulate patient 3 times a day  - Out of bed to chair 3 times a day   - Out of bed for meals 3 times a day  - Out of bed for toileting  - Record patient progress and toleration of activity level   Outcome: Progressing     Problem: DISCHARGE PLANNING  Goal: Discharge to home or other facility with appropriate resources  Description: INTERVENTIONS:  - Identify barriers to discharge w/patient and caregiver  - Arrange for needed discharge resources and transportation as appropriate  - Identify discharge learning needs (meds, wound care, etc )  - Arrange for interpretive services to assist at discharge as needed  - Refer to Case Management Department for coordinating discharge planning if the patient needs post-hospital services based on physician/advanced practitioner order or complex needs related to functional status, cognitive ability, or social support system  Outcome: Progressing     Problem: Nutrition/Hydration-ADULT  Goal: Nutrient/Hydration intake appropriate for improving, restoring or maintaining nutritional needs  Description: Monitor and assess patient's nutrition/hydration status for malnutrition  Collaborate with interdisciplinary team and initiate plan and interventions as ordered  Monitor patient's weight and dietary intake as ordered or per policy  Utilize nutrition screening tool and intervene as necessary  Determine patient's food preferences and provide high-protein, high-caloric foods as appropriate       INTERVENTIONS:  - Monitor oral intake, urinary output, labs, and treatment plans  - Assess nutrition and hydration status and recommend course of action  - Evaluate amount of meals eaten  - Assist patient with eating if necessary   - Allow adequate time for meals  - Recommend/ encourage appropriate diets, oral nutritional supplements, and vitamin/mineral supplements  - Order, calculate, and assess calorie counts as needed  - Recommend, monitor, and adjust tube feedings and TPN/PPN based on assessed needs  - Assess need for intravenous fluids  - Provide specific nutrition/hydration education as appropriate  - Include patient/family/caregiver in decisions related to nutrition  Outcome: Progressing     Problem: MOBILITY - ADULT  Goal: Maintain or return to baseline ADL function  Description: INTERVENTIONS:  -  Assess patient's ability to carry out ADLs; assess patient's baseline for ADL function and identify physical deficits which impact ability to perform ADLs (bathing, care of mouth/teeth, toileting, grooming, dressing, etc )  - Assess/evaluate cause of self-care deficits   - Assess range of motion  - Assess patient's mobility; develop plan if impaired  - Assess patient's need for assistive devices and provide as appropriate  - Encourage maximum independence but intervene and supervise when necessary  - Involve family in performance of ADLs  - Assess for home care needs following discharge   - Consider OT consult to assist with ADL evaluation and planning for discharge  - Provide patient education as appropriate  Outcome: Progressing  Goal: Maintains/Returns to pre admission functional level  Description: INTERVENTIONS:  - Perform BMAT or MOVE assessment daily    - Set and communicate daily mobility goal to care team and patient/family/caregiver  - Collaborate with rehabilitation services on mobility goals if consulted  - Perform Range of Motion 3 times a day  - Reposition patient every 2 hours    - Dangle patient 3 times a day  - Stand patient 3 times a day  - Ambulate patient 3 times a day  - Out of bed to chair 3 times a day   - Out of bed for meals 3 times a day  - Out of bed for toileting  - Record patient progress and toleration of activity level   Outcome: Progressing

## 2023-01-03 NOTE — SEDATION DOCUMENTATION
Abdominal and back abscess drainage tubes checked by Dr Mills Monday  Keep tubes in place, will check again in 2 weeks  Continue flushing each with 10cc daily  Report given to bedside RN

## 2023-01-03 NOTE — PROGRESS NOTES
PM&R PROGRESS NOTE:  Osvaldo Grey 61 y o  male MRN: [id number]  Unit/Bed#: -85 Encounter: [id number]        Rehabilitation Diagnosis: Impairment of mobility, safety and Activities of Daily Living (ADLs) due to Neurologic Conditions:  03 8  Neuromuscular Disorders    HPI: Edson Ahumada is a 61 y o  male with a medical history of HTN, HLD, GERD, and ventral hernia that presented to [facility] on 11/7 for an elective surgical procedure d/t metastatic colon adenocarcinoma by Dr Brynn Chaney  Patient presented to hem/onc 2/22 after CT abdomen showed transverse colonic apple core lesion and innumerable hepatic metastases  MRI revealed multiple hepatic metastasis with smaller lesions in left lobe and larger lesions in right lobe  Patient completed chemotherapy, colostomy at that time  On 11/7 patient underwent exploratory laparotomy, segment 3 liver resection, ablation, intraoperative ultrasound, and colostomy reversal  This was c/b left upper quadrant hematoma, imaging showed suspected leak from transverse colon anastomosis  On 11/16, patient underwent exp lap which revealed LUQ infected hematoma, defect in transverse colon anastomosis with extravasation of succus  Massively dilated cecum requiring resection  He had a right hemicolectomy, left in discontinuity and temporary abdominal closure  On 11/17, re-exploration, partial descending colectomy, primary colonic anastomosis created with diverting loop ileostomy and midline wound VAC placement  He completed course of IV abx for ESBL bacteremia from abdominal abscess  Midline abdominal incision wet to dry dressings and packing to old stoma site  12/6 abdominal incision noted to have yellow drainage  CT abdomen showed abdominal abscess and distended gallbladder  Patient underwent percutaneous cholecystostomy tube insertion and hepatectomy abscess drainage  ID consulted, IV abx for 1 week  Nephrology consulted d/t ALEXY, creatinine baseline 1 2-1 4   On 12/14 patient experienced chest pain, appeared musculoskeletal with no further work-up  Patient deemed medically stable and admitted to [facility] on 12/14/2022  SUBJECTIVE: Patient seen face to face  No acute issues  Reports nausea from doxycyline, decreased appetite  ID consulted for recommendations  Progressing as expected in rehabilitation  No CP, SOB, fever, chills, vomiting  ASSESSMENT: Stable, progressing    PLAN:  1  IR for tube check- per IR tubes stay in place, bulb changed, recheck in 2 weeks  2  Alk phosphatase- 1315, GI recommendations appreciated  Awaiting Gamma GT results  3  Surgical oncology seen patient at beside- visualized incision- no signs of infection- Mesalt placed back onto superior portion of the wound/covered with an ABD pad  Wound care to see patient tomorrow  4  Nausea- ID d/c doxycycline d/t nausea, restarted ertapenem for 1 week, d/c prior to discharge home  Rehabilitation  • Functional deficits:  Decreased foot clearance, shuffle gait  • Continue current rehabilitation plan of care to maximize function      • Functional update:   o PT: transfers- incidental touching, ambulation- incidental touching- RW 30' x2  o OT: ADL- min A for balance, hygiene, dressing UB- supervision, LB dressing incidental touching    • Estimated Discharge: ADD 01/09    Pain  • Tylenol, oxycodone    DVT prophylaxis  • heparin    Bladder plan  • Continent    Bowel plan  • Continent      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 liver [facility])  Assessment & Plan  · MRI-multiple hepatic metastasis; smaller lesions in left lobe, larger lesions in right lobe  · 11/7 Exp Lap, resection of hepatic segment 3, resection of transverse colon with reversal of loop colostomy (Dr Gabrielle Darnell)  · 11/16- right hemicolectomy, left discontinuity and temporary abdominal closure device placed  · 11/17- re-exploration, partial descending colectomy, primary colocolonic anastomosis with diverting loop ileostomy, midline VAC placement  · CT showed abdominal abscess and distended gallbladder  · 12/8- IR percutaneous cholecystostomy tube, hepatectomy abscess drain placement  ·  IR on 12/20 additional drains placed for a perisplenic abscess as well as a splenic recess abscess  · Total of 3 drains in place  * Malignant neoplasm of transverse colon [facility])  Assessment & Plan  · Transverse colonic apple core lesion and innumerable hepatic metastases  · S/p Colostomy placement 2/22  · RCW port-a-cath, accessed  · S/p palliative chemo  · Follows with Dr Taco Willoughby (palliative)  · Follows hem/onc (Dr Michel Mendoza)    Elevated alkaline phosphatase level  Assessment & Plan  1315, (previous 1066)  Hepatic mets  Follow level  ?bone involvement - no evidence of ROM limitations or pain complaints on survey  Follow up with Surg Onc    Dehiscence of incision  Assessment & Plan  Improving  Wound Care following and surgical oncology following   Slough present  Santyl ordered 12/29/22    [address] as follows:   Skin Care Plan:  1-Midline Incision and Right Abdomen Wounds: Cleanse wounds with NS and pat dry  Apply Santyl to slough tissue on wound bed nickel thick  Cover with ABD and secure with minimal tape  Change daily or PRN soilage or displacement  2-Turn/reposition q2h or when medically stable for pressure re-distribution on skin   3-Elevate heels to offload pressure  4-Moisturize skin daily with skin nourishing cream  5-Ehob cushion in chair when out of bed  6-Preventative Hydraguard to bilateral heels and bilateral sacro-buttocks BID and PRN          Leukocytosis  Assessment & Plan  WBC 12 67 (previous 10 15)    Acute pain  Assessment & Plan  Managed on Scheduled Tylenol 975 mg [id number]  Managed on Robaxin 500 mg BId  Oxycodone IR PRN  Moderately managed     Abscess  Assessment & Plan  · Abdominal abscess- ESBL E Coli - Contact precautions  · Zosyn completed  · Completed Ertapenum course 12/28/22  · Starting Doxycycline 100 mg Q12h 12/30/22 per ID consultants      · 3 drains placed by IR  · Monitor 3 drain output:  · Abscess drain abdomen A: 0 cc   · Abscess drain back: 0-10 cc  · Abscess drain abdomen B: 15 - 20cc    1/3- tube check w/ IR  Tube position check  Patient having some discomfort in left abdomen while supine - per IR tubes in place, bulbs changed, recheck tube placement in 2 weeks  Sepsis ([address] )  Assessment & Plan  · SIRS versus sepsis at this time given his vitals, leukocytosis, and complicated infection history  · Mgmt per ID, IM  · 12/20-IR perisplenic, perigastric drain placement for 2 additional abscesses-  · Perigastric abscess +E  Coli ESBL  · Blood cultures negative     Acute on chronic kidney failure [facility])  Assessment & Plan  · Creatinine baseline 1 2-1 4  · Cr = 1 3  · Monitor BMP    Bacteremia  Assessment & Plan  · Remains on Abx     Iron deficiency anemia, unspecified  Assessment & Plan  · Hbg = 8 1  · 12/15-1 unit PRBc  · 12/16- symptomatic- 1 unit PRBc  · 12/28- 1 unit PRBc  · Monitor CBC    Obesity (BMI 30-39  9)  Assessment & Plan  · BMI 33 0    Benign essential hypertension  Assessment & Plan  · Home: Norvasc 5 mg BID  · Here: Norvasc 5 mg BID  · Adjust medication as needed    Type 2 diabetes mellitus without complication, with long-term current use of insulin (HCC)  Assessment & Plan  · HbgA1c 8 0 (9/22)  · Home monitoring with Nilesh  · Home: Lantus 12 units, Humalog 3 units with meals, Januvia  · Here: Lantus 6 units,  Humalog 6 units with meals, SSI, Lantus   · Follow with PCP (Dr Jordan)        [address] [facility] consultants medical co-management  Labs, medications, and imaging personally reviewed  ROS:  A ten point review of systems was completed on 01/03/23 and pertinent positives are listed in subjective section  All other systems reviewed were negative     Review of Systems     OBJECTIVE:   /77 (BP Location: Left arm)   Pulse 99   Temp 97 5 °F (36 4 °C) (Oral)   Resp 18   Ht 5' 10" (1 778 m)   Wt 104 kg (230 lb) SpO2 99%   BMI 33 00 kg/m²     Physical Exam  Constitutional:       Appearance: He is obese  HENT:      Head: Normocephalic and atraumatic  Mouth/Throat:      Mouth: Mucous membranes are moist    Cardiovascular:      Rate and Rhythm: Regular rhythm  Tachycardia present  Pulses: Normal pulses  Heart sounds: Normal heart sounds  Pulmonary:      Effort: Pulmonary effort is normal       Breath sounds: Normal breath sounds  Abdominal:      Palpations: Abdomen is soft  Tenderness: There is abdominal tenderness  Musculoskeletal:         General: Normal range of motion  Skin:     General: Skin is warm and dry  Capillary Refill: Capillary refill takes less than 2 seconds  Neurological:      Mental Status: He is alert     Psychiatric:         Mood and Affect: Mood normal           Lab Results   Component Value Date    WBC 12 67 (H) 01/02/2023    HGB 8 1 (L) 01/02/2023    HCT 26 9 (L) 01/02/2023    MCV 86 01/02/2023     (H) 01/02/2023     Lab Results   Component Value Date    SODIUM 136 01/03/2023    K 4 1 01/03/2023     01/03/2023    CO2 19 (L) 01/03/2023    BUN 20 01/03/2023    CREATININE 1 30 01/03/2023    GLUC 139 01/03/2023    CALCIUM 8 5 01/03/2023     Lab Results   Component Value Date    INR 1 12 12/19/2022    INR 1 10 11/12/2022    INR 1 24 (H) 11/09/2022    PROTIME 14 7 (H) 12/19/2022    PROTIME 14 4 11/12/2022    PROTIME 15 8 (H) 11/09/2022       Current Facility-Administered Medications:   •  acetaminophen (TYLENOL) tablet 975 mg, 975 mg, Oral, Q8H [address], GAURANG Keith, 975 mg at 01/03/23 1403  •  amLODIPine (NORVASC) tablet 5 mg, 5 mg, Oral, BID, GAURANG Keith, 5 mg at 01/03/23 0815  •  aspirin chewable tablet 81 mg, 81 mg, Oral, Daily, GAURANG Keith, 81 mg at 01/03/23 0815  •  atorvastatin (LIPITOR) tablet 40 mg, 40 mg, Oral, Daily, GAURANG Keith, 40 mg at 01/03/23 0815  •  calcium carbonate (TUMS) chewable tablet 500 mg, 500 mg, Oral, BID PRN, Aloma Lav, CRNP, 500 mg at 01/03/23 1406  •  cholecalciferol (VITAMIN D3) tablet 400 Units, 400 Units, Oral, Daily, GAURANG Keith, 400 Units at 01/03/23 1290  •  collagenase (SANTYL) ointment, , Topical, Daily, Zeenat Ny MD, Given at 01/03/23 8843  •  ertapenem (INVanz) 1,000 mg in sodium chloride 0 9 % 50 mL IVPB, 1,000 mg, Intravenous, Q24H, Diane Lamb MD  •  heparin (porcine) subcutaneous injection 5,000 Units, 5,000 Units, Subcutaneous, Q8H [address], GAURANG Keith, 5,000 Units at 01/03/23 1404  •  insulin glargine (LANTUS) subcutaneous injection 10 Units 0 1 mL, 10 Units, Subcutaneous, HS, GAURANG Cardenas  •  insulin lispro (HumaLOG) 100 units/mL subcutaneous injection 1-5 Units, 1-5 Units, Subcutaneous, HS, GAURANG Keith, 1 Units at 12/31/22 2142  •  insulin lispro (HumaLOG) 100 units/mL subcutaneous injection 1-6 Units, 1-6 Units, Subcutaneous, TID AC, 3 Units at 01/03/23 1137 **AND** Fingerstick Glucose (POCT), , , TID AC, GAURANG Keith  •  insulin lispro (HumaLOG) 100 units/mL subcutaneous injection 6 Units, 6 Units, Subcutaneous, TID With Meals, GAURANG Rubio, 6 Units at 01/03/23 1136  •  iohexol (OMNIPAQUE) 350 MG/ML injection (SINGLE-DOSE) 50 mL, 50 mL, Intravenous, Once in imaging, Cesar Ford MD  •  melatonin tablet 3 mg, 3 mg, Oral, HS, GAURANG Keith, 3 mg at 01/02/23 2102  •  methocarbamol (ROBAXIN) tablet 500 mg, 500 mg, Oral, BID, GAURANG Keith, 500 mg at 01/03/23 0815  •  multivitamin-minerals (CENTRUM) tablet 1 tablet, 1 tablet, Oral, Daily, GAURANG Keith, 1 tablet at 01/03/23 0815  •  ondansetron (ZOFRAN-ODT) dispersible tablet 4 mg, 4 mg, Oral, Q6H PRN, GAURANG Keith, 4 mg at 01/02/23 1533  •  ondansetron (ZOFRAN-ODT) dispersible tablet 4 mg, 4 mg, Oral, BID, GAURANG Cardenas, 4 mg at 01/03/23 8684  •  oxyCODONE (ROXICODONE) immediate release tablet 10 mg, 10 mg, Oral, Q6H PRN, Jenelle GAURANG Martin, 10 mg at 01/02/23 2103  •  oxyCODONE (ROXICODONE) IR tablet 2 5 mg, 2 5 mg, Oral, Q3H PRN, GAURANG Keith, 2 5 mg at 12/25/22 1911  •  oxyCODONE (ROXICODONE) IR tablet 5 mg, 5 mg, Oral, Q6H PRN, GAURANG Keith, 5 mg at 12/31/22 0941  •  pantoprazole (PROTONIX) EC tablet 40 mg, 40 mg, Oral, BID AC, GAURANG Keith, 40 mg at 01/03/23 3357  •  simethicone (MYLICON) chewable tablet 80 mg, 80 mg, Oral, 4x Daily (with meals and at bedtime), GAURANG Keith, 80 mg at 01/03/23 1136  •  tamsulosin (FLOMAX) capsule 0 4 mg, 0 4 mg, Oral, Daily With Dinner, Percilla Coil, RANNP, 0 4 mg at 01/02/23 1533    Past Medical History:   Diagnosis Date   • Abdominal pain 03/12/2022   • Acute renal failure ([place] [address] )     [id number] resolved   • Cancer ([place] [address] )    • Diabetes mellitus ([place] [address] )    • Enteritis 08/23/2016   • Gastroparesis due to DM ([place] [address] ) 08/23/2016   • GERD (gastroesophageal reflux disease)    • Hernia, ventral 08/04/2016   • Hyperlipidemia    • Hypertension    • Morbid obesity ([place] [address] ) 04/17/2018   • Postoperative visit 03/02/2022   • SIRS (systemic inflammatory response syndrome) ([place] [address] ) 03/12/2022   • Snoring    • Stage 3a chronic kidney disease ([place] [address] ) 02/19/2022       Patient Active Problem List    Diagnosis Date Noted   • 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 liver ([place] [address] ) 03/02/2022   • Malignant neoplasm of transverse colon ([place] [address] ) 03/01/2022   • Dehiscence of incision 12/30/2022   • Elevated alkaline phosphatase level 12/30/2022   • Leukocytosis 12/29/2022   • Abscess 12/27/2022   • Acute pain 12/27/2022   • Sepsis [facility] ) 12/17/2022   • Severe protein-calorie malnutrition ([facility] ) 12/14/2022   • Acute on chronic kidney failure ([facility] ) 12/14/2022   • Flash pulmonary edema ([facility] ) 11/12/2022   • MR (mitral regurgitation) 11/12/2022   • Bacteremia 11/12/2022   • ESBL (extended spectrum beta-lactamase) producing bacteria infection 11/12/2022   • Acute respiratory failure with hypoxia ([facility] ) 11/12/2022   • Encephalopathy 11/09/2022   • Cervical radiculopathy 10/26/2022   • Other fatigue 06/15/2022   • Colostomy prolapse ([facility] ) 05/27/2022   • Colon cancer metastasized to liver ([facility] ) 03/12/2022   • Iron deficiency anemia, unspecified 03/01/2022   • Thrombocytosis 02/21/2022   • Hypokalemia 02/19/2022   • Transaminitis 02/19/2022   • Type 2 diabetes mellitus with hyperlipidemia [facility] ) 02/18/2022   • Colonic mass 02/18/2022   • Microcytic anemia 02/18/2022   • Left ureteral calculus 01/30/2020   • Incarcerated umbilical hernia 46/14/3841   • Testicular hypogonadism 06/19/2017   • Low testosterone 05/30/2017   • Type 2 diabetes mellitus without complication, with long-term current use of insulin ([facility] ) 08/23/2016   • Benign essential hypertension 08/23/2016   • Mixed hyperlipidemia 08/23/2016   • Erectile dysfunction 07/11/2016   • Obesity (BMI 30-39 9) 07/11/2016      Alex Patelutant, GAURANG  Physical Medicine and Chris Luu    Total visit time: 25 minutes, with more than 50% spent counseling/coordinating care  Counseling includes discussion with patient re: progress in therapies, functional issues observed by therapy staff, and discussion with patient regarding their current medical state and wellbeing  Coordination of patient's care was performed in conjunction with Internal Medicine service to monitor patient's labs, vitals, and management of their comorbidities

## 2023-01-03 NOTE — PROGRESS NOTES
Progress Note - Infectious Disease   Lorri Luciano 61 y o  male MRN: [id number]  Unit/Bed#: -01 Encounter: [id number]      Impression/Recommendations:  Sepsis     Evolving 12/17:  WBC, HR   Suspect due to persistent left intra-abdominal infection in setting of complex history below, recently completed antibiotics   ROS and exam otherwise negative   Recent Flu/RSV/COVID PCR, CXR negative   Blood cultures negative   Improved with reinitiation of antibiotics, additional drain placement  Rec:  • Continue antibiotics as below  • Drains as below  • Follow temperatures closely  • Recheck CBC in Thursday  • Supportive care as per the primary service     Intra-abdominal abscess     In the setting complex surgical history as below   Initially developed 11/2022 accompanied by ESBL E  coli bacteremia   Status post exploratory laparotomy, right hemicolectomy, temporary abdominal closure 11/16; re-exploration, partial left colectomy, primary ileocolonic anastomosis with proximal diverting loop ileostomy, midline fascial closure on 11/17   More recently developed abscess in hepatectomy bed, also collection +/- leak at area of prior colonic anastamosis, 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   Status post IR drain into perihepatic collection 12/8   Cultures with ESBL E  Coli   Status post 7 days ertapenem after drain placement through 12/15   Developed recurrent sepsis and repeat CT 12/17 shows hepatic bed collection improved, persistent left intra-abdominal collection   Status post perigastic, perisplenic drains 12/20   Cultures again with ESBL E  Coli  Status post 14 days IV antibiotics, now on doxycycline complicated by nausea, anorexia    Rec:  • Change antibiotics back to ertapenem given nausea, decreased oral intake on doxycycline  • Continue drains per IR  • Anticipate ertapenem for 1 more week which can be discontinued prior to discharge home     Possible acute cholecystitis     Status post percutaneous cholecystostomy tube   Alk phos remains markedly elevated, possibly due to drain itself versus known intrahepatic metastases      Metastatic colon cancer   To liver, possible lung   Status post resection, chemotherapy, more recent hepatic resection and ablation, transverse colon resection      CKD   Baseline Cr 1 4       DM      Anemia   Status post multiple transfusions  The above plan was discussed in detail with the patient as well as Sofía Lagunasr with IM and Dr Schultz Po      Antibiotics:  Doxycycline #5  Antibiotics #17    Subjective:  Patient seen on AM rounds  Just back from IR tube check  Discouraged that drains remain in place  Reports worsening nausea since initiation of doxycycline  Poor appetite since then  Only eating 1 meal per day  24 Hour Events:  No documented fevers, chills, sweats, nausea, vomiting, or diarrhea  Went to [email or web address] today  No note available  Objective:  Vitals:  Temp:  [97 9 °F (36 6 °C)-99 9 °F (37 7 °C)] 99 °F (37 2 °C)  HR:  [100-109] 100  Resp:  [17-18] 17  BP: (124-151)/(78-85) 142/78  SpO2:  [94 %-96 %] 96 %  Temp (24hrs), Av 9 °F (37 2 °C), Min:97 9 °F (36 6 °C), Max:99 9 °F (37 7 °C)  Current: Temperature: 99 °F (37 2 °C)    Physical Exam:   General:  No acute distress  Psychiatric:  Awake and alert  Pulmonary:  Normal respiratory excursion without accessory muscle use  Abdomen:  Soft, nontender  Extremities:  No edema  Skin:  No rashes    Lab Results:  I have personally reviewed pertinent labs    Results from last 7 days   Lab Units 23  0635 22  0639 22  0526   POTASSIUM mmol/L 4 1 4 1 3 8   CHLORIDE mmol/L 108 112* 112*   CO2 mmol/L 19* 21 21   BUN mg/dL 20 20 21   CREATININE mg/dL 1 30 1 23 1 21   EGFR ml/min/1 73sq m 60 64 65   CALCIUM mg/dL 8 5 9 0 8 9   AST U/L 54* 46* 46*   ALT U/L 14 16 15   ALK PHOS U/L 1,315* 1,066* 952*     Results from last 7 days   Lab Units 23  0602 22  0458 22  0639   WBC Thousand/uL 12 67* 10 91* 10 15   HEMOGLOBIN g/dL 8 1* 8 0* 7 0*   PLATELETS Thousands/uL 620* 600* 501*           Imaging Studies:   I have personally reviewed pertinent imaging study reports and images in PACS  EKG, Pathology, and Other Studies:   I have personally reviewed pertinent reports

## 2023-01-03 NOTE — PROGRESS NOTES
01/03/23 0900   Cognition   Orientation Level Oriented X4   Therapy Time missed   Time missed?  Yes   Amount of time missed 60   Reason for time missed Medical procedure  (Off the floor at IR)

## 2023-01-03 NOTE — PROGRESS NOTES
01/03/23 1430   Pain Assessment   Pain Assessment Tool 0-10   Pain Score No Pain   Restrictions/Precautions   Precautions Fall Risk;Contact/isolation;Multiple lines  (VICTORINO drain, ileostomy, IR drain)   Subjective   Subjective pt agreeable to perform skilled PT   Lying to Sitting on Side of Bed   Type of Assistance Needed Supervision   Lying to Sitting on Side of Bed CARE Score 4   Sit to Stand   Type of Assistance Needed Incidental touching   Sit to Stand CARE Score 4   Bed-Chair Transfer   Type of Assistance Needed Incidental touching   Comment RW   Chair/Bed-to-Chair Transfer CARE Score 4   Transfer Bed/Chair/Wheelchair   Adaptive Equipment Roller Walker   Walk 10 Feet   Type of Assistance Needed Incidental touching; Adaptive equipment   Comment RW   Walk 10 Feet CARE Score 4   Walk 50 Feet with Two Turns   Comment limited fatigue and    Reason if not Attempted Safety concerns   Walk 50 Feet with Two Turns CARE Score 88   Walk 150 Feet   Reason if not Attempted Safety concerns   Walk 150 Feet CARE Score 88   Walking 10 Feet on Uneven Surfaces   Reason if not Attempted Safety concerns   Walking 10 Feet on Uneven Surfaces CARE Score 88   Ambulation   Primary Mode of Locomotion Prior to Admission Walk   Distance Walked (feet) 15 ft  (x2)   Assist Device Roller Walker   Does the patient walk? 2  Yes   Wheel 50 Feet with Two Turns   Type of Assistance Needed Supervision   Wheel 50 Feet with Two Turns CARE Score 4   Wheel 150 Feet   Type of Assistance Needed Supervision   Wheel 150 Feet CARE Score 4   Wheelchair mobility   Does the patient use a wheelchair? 1  Yes   Type of Wheelchair Used 1  Manual   Method Right upper extremity; Left upper extremity   Assessment   Treatment Assessment pt engaged in skilled PT limited with thex ex;s for LE performed LAQ AP marching 10-15 reps and STS x2 sets x5 reps and HR up to 146 , pt rest  cont to monitor HR during PT session see vitals and pt limited functional mobility d/t fatigue and weakness thru-out his body   Pt however , is improving a little each day and alayna form Eval day   Pt will cont to need skilled PT to meet DC goals  Pt in recliner with all needs in reach , Cont POC   Barriers to Discharge Inaccessible home environment   Plan   Progress Progressing toward goals   Recommendation   PT Discharge Recommendation Home with home health rehabilitation   PT Therapy Minutes   PT Time In 1430   PT Time Out 1530   PT Total Time (minutes) 60   PT Mode of treatment - Individual (minutes) 60   PT Mode of treatment - Concurrent (minutes) 0   PT Mode of treatment - Group (minutes) 0   PT Mode of treatment - Co-treat (minutes) 0   PT Mode of Treatment - Total time(minutes) 60 minutes   PT Cumulative Minutes 1150   Therapy Time missed   Time missed?  No

## 2023-01-04 LAB
GLUCOSE SERPL-MCNC: 137 MG/DL (ref 65–140)
GLUCOSE SERPL-MCNC: 165 MG/DL (ref 65–140)
GLUCOSE SERPL-MCNC: 169 MG/DL (ref 65–140)
GLUCOSE SERPL-MCNC: 174 MG/DL (ref 65–140)

## 2023-01-04 RX ADMIN — CHOLECALCIFEROL TAB 10 MCG (400 UNIT) 400 UNITS: 10 TAB at 08:30

## 2023-01-04 RX ADMIN — SIMETHICONE 80 MG: 80 TABLET, CHEWABLE ORAL at 11:58

## 2023-01-04 RX ADMIN — INSULIN LISPRO 1 UNITS: 100 INJECTION, SOLUTION INTRAVENOUS; SUBCUTANEOUS at 12:08

## 2023-01-04 RX ADMIN — ERTAPENEM SODIUM 1000 MG: 1 INJECTION, POWDER, LYOPHILIZED, FOR SOLUTION INTRAMUSCULAR; INTRAVENOUS at 20:26

## 2023-01-04 RX ADMIN — HEPARIN SODIUM 5000 UNITS: 5000 INJECTION INTRAVENOUS; SUBCUTANEOUS at 21:51

## 2023-01-04 RX ADMIN — METHOCARBAMOL 500 MG: 500 TABLET ORAL at 08:29

## 2023-01-04 RX ADMIN — ONDANSETRON 4 MG: 4 TABLET, ORALLY DISINTEGRATING ORAL at 20:43

## 2023-01-04 RX ADMIN — INSULIN LISPRO 1 UNITS: 100 INJECTION, SOLUTION INTRAVENOUS; SUBCUTANEOUS at 07:50

## 2023-01-04 RX ADMIN — SIMETHICONE 80 MG: 80 TABLET, CHEWABLE ORAL at 17:14

## 2023-01-04 RX ADMIN — HEPARIN SODIUM 5000 UNITS: 5000 INJECTION INTRAVENOUS; SUBCUTANEOUS at 13:56

## 2023-01-04 RX ADMIN — INSULIN LISPRO 6 UNITS: 100 INJECTION, SOLUTION INTRAVENOUS; SUBCUTANEOUS at 12:08

## 2023-01-04 RX ADMIN — INSULIN GLARGINE 10 UNITS: 100 INJECTION, SOLUTION SUBCUTANEOUS at 21:50

## 2023-01-04 RX ADMIN — METHOCARBAMOL 500 MG: 500 TABLET ORAL at 17:15

## 2023-01-04 RX ADMIN — COLLAGENASE SANTYL: 250 OINTMENT TOPICAL at 08:29

## 2023-01-04 RX ADMIN — ACETAMINOPHEN 975 MG: 325 TABLET, FILM COATED ORAL at 13:56

## 2023-01-04 RX ADMIN — SIMETHICONE 80 MG: 80 TABLET, CHEWABLE ORAL at 21:51

## 2023-01-04 RX ADMIN — INSULIN LISPRO 1 UNITS: 100 INJECTION, SOLUTION INTRAVENOUS; SUBCUTANEOUS at 21:49

## 2023-01-04 RX ADMIN — INSULIN LISPRO 6 UNITS: 100 INJECTION, SOLUTION INTRAVENOUS; SUBCUTANEOUS at 17:51

## 2023-01-04 RX ADMIN — INSULIN LISPRO 6 UNITS: 100 INJECTION, SOLUTION INTRAVENOUS; SUBCUTANEOUS at 07:50

## 2023-01-04 RX ADMIN — TAMSULOSIN HYDROCHLORIDE 0.4 MG: 0.4 CAPSULE ORAL at 17:15

## 2023-01-04 RX ADMIN — ACETAMINOPHEN 975 MG: 325 TABLET, FILM COATED ORAL at 06:32

## 2023-01-04 RX ADMIN — ATORVASTATIN CALCIUM 40 MG: 40 TABLET, FILM COATED ORAL at 08:29

## 2023-01-04 RX ADMIN — PANTOPRAZOLE SODIUM 40 MG: 40 TABLET, DELAYED RELEASE ORAL at 06:32

## 2023-01-04 RX ADMIN — AMLODIPINE BESYLATE 5 MG: 5 TABLET ORAL at 17:15

## 2023-01-04 RX ADMIN — MELATONIN TAB 3 MG 3 MG: 3 TAB at 20:27

## 2023-01-04 RX ADMIN — PANTOPRAZOLE SODIUM 40 MG: 40 TABLET, DELAYED RELEASE ORAL at 17:15

## 2023-01-04 RX ADMIN — ASPIRIN 81 MG CHEWABLE TABLET 81 MG: 81 TABLET CHEWABLE at 08:28

## 2023-01-04 RX ADMIN — HEPARIN SODIUM 5000 UNITS: 5000 INJECTION INTRAVENOUS; SUBCUTANEOUS at 06:32

## 2023-01-04 RX ADMIN — ACETAMINOPHEN 975 MG: 325 TABLET, FILM COATED ORAL at 21:50

## 2023-01-04 RX ADMIN — SIMETHICONE 80 MG: 80 TABLET, CHEWABLE ORAL at 07:51

## 2023-01-04 RX ADMIN — ONDANSETRON 4 MG: 4 TABLET, ORALLY DISINTEGRATING ORAL at 06:32

## 2023-01-04 RX ADMIN — Medication 1 TABLET: at 08:28

## 2023-01-04 NOTE — PROGRESS NOTES
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  • s/p hepatic resection and reversal of colostomy on 11/7  • s/p right hemicolectomy with partial descending colectomy colocolonic anastomosis with loop ileostomy and mid line abdominal VAC placement 11/16  • Continue local wound care; WC following  • CT  Abd/pelvis 12/17 = loculated collection along splenic recess   Has perisplenic hilum collection as well --> to IR 12/20 for drain placement in both perigastric and perisplenic area and cx sent from both areas = Ecoli  • Flush drains with 10ml qd; drained 45/20/10 on 1/3  • S-O following and Dr Anusha Bliss was in to see pt 1/2/23  • Tube check (incl perc alvina tube) done 1/3 = unchanged and recheck 2 weeks @1/17  • There is bloody drainage in VICTORINO #3 today and IR to see pt     Acute cholecystitis  • s/p percutaneous tube placement 12/8 with tube check on 12/12, 12/14  • GI saw due to alk phos elevation = felt 2/2 infiltrative disease rather than focal biliary obstruction   AMA was negative     • Alkaline phosphatase isoenzyme sent 12/17 (48% liver/52% bone) = GI  recommended continuing to monitor his LFTs and defer to surgical oncology for further w/u and plan   Last 3 APs were [phone]  Francoise Mccann recheck 1/3  • TB improving = to consider MRCP if total bili trends back up but was normal 12/30  • Alvina tube = 20 ml 1/3/23     • Drainage still has dark blood in it but seems to slowly becoming less  • Perc alvina tube check done 1/3     • Per GI, do cholangiogram with next tube check --> wasn't done 1/3 and GI aware  • GI wrote note 1/4 = aware labs     Bacteremia/abdominal abscess  • ID following   • s/p Ertapenem was switched to Doxycycline per ID as of 12/30  • Blood cultures negative    • CT abdomen and pelvis as above  • + nausea since starting Doxy on 12/30   Lasts 1-2 hrs and was affecting appetite pt said   • Changed back to Ertapenem 1/3/23 since was having nausea with the Doxy     Hypertension  • On Norvasc 5mg BID  • stable     Diabetes mellitus  • On Lantus 10U qhs/Lispro 6U TID  • Continue DM diet and QID Accuchecks/SSI  • Increased Lantus to 10U qhs from 6U to start 12/31/22  • Eating/meal completion is erratic and therefore BSs difficult  • May have to consider stopping Lispro WM and increasing coverage scale instead if no improvement     Acute on chronic renal failure  • Stage III; baseline 1 2-1 4  • Lisinopril currently on hold  • s/p NSS IVF with improvement of creat =  back down to 1 2 12/30  • Stable at 1 3      Anemia  • Multifactorial due to recent infection, surgery, CKD stage III  • Transfused on 12/15, 12/16/22 and 12/30  • Hemoglobin 1/2/23 stable at 8 1     Atypical chest pain  • With elevation in troponin/EKG changes on acute side  • Cardiology saw then and cleared pt for discharge  • Did not recommend any further work up  • No further chest pain     Situational depression  • Neuropsychology consulted  • Patient not interested in medications at this point in time     Malnutrition  • Glucerna makes him nauseated  • On 12/15, ordered double protein     Pleural effusion  • CXR on 12/14 showed small left pleural effusion and was suspect for CHF  • ECHO 11/9/22 = LVEF 55%, unable to assess diastolic function, mild TR     L sided pleuritic pain  • located near drain site and per patient only occurs at night  • No evidence of infection        Discharge date:  1/9/23       The above assessment and plan was reviewed and updated as determined by my evaluation of the patient on 1/4/2023      Labs:   Results from last 7 days   Lab Units 01/02/23  0602 12/31/22  0458   WBC Thousand/uL 12 67* 10 91*   HEMOGLOBIN g/dL 8 1* 8 0*   HEMATOCRIT % 26 9* 27 5*   PLATELETS Thousands/uL 620* 600* Results from last 7 days   Lab Units 01/03/23  0635 12/30/22  0639   SODIUM mmol/L 136 139   POTASSIUM mmol/L 4 1 4 1   CHLORIDE mmol/L 108 112*   CO2 mmol/L 19* 21   BUN mg/dL 20 20   CREATININE mg/dL 1 30 1 23   CALCIUM mg/dL 8 5 9 0             Results from last 7 days   Lab Units 01/04/23  0626 01/03/23  2057 01/03/23  1604   POC GLUCOSE mg/dl 165* 147* 226*       Review of Scheduled Meds:  Current Facility-Administered Medications   Medication Dose Route Frequency Provider Last Rate   • acetaminophen  975 mg Oral Q8H [address] GAURANG Keith     • amLODIPine  5 mg Oral BID GAURANG Keith     • aspirin  81 mg Oral Daily GAURANG Keith     • atorvastatin  40 mg Oral Daily GAURANG Keith     • calcium carbonate  500 mg Oral BID PRN GAURANG Painter     • cholecalciferol  400 Units Oral Daily GAURANG Keith     • collagenase   Topical Daily Aleks Purdy MD     • ertapenem  1,000 mg Intravenous Q24H Moraima Valdez MD 1,000 mg (01/03/23 2114)   • heparin (porcine)  5,000 Units Subcutaneous Q8H [address] GAURANG Keith     • insulin glargine  10 Units Subcutaneous HS GAURANG Hinton     • insulin lispro  1-5 Units Subcutaneous HS GAURANG Keith     • insulin lispro  1-6 Units Subcutaneous TID AC GAURANG Keith     • insulin lispro  6 Units Subcutaneous TID With Meals GAURANG Hinton     • iohexol  50 mL Intravenous Once in imaging Jessica Mata MD     • melatonin  3 mg Oral HS GAURANG Keith     • methocarbamol  500 mg Oral BID GAURANG Painter     • multivitamin-minerals  1 tablet Oral Daily GAURANG Keith     • ondansetron  4 mg Oral Q6H PRN GAURANG Keith     • ondansetron  4 mg Oral BID Michaelyn Spatz, CRNP     • oxyCODONE  10 mg Oral Q6H PRN GAURANG Painter     • oxyCODONE  2 5 mg Oral Q3H PRN GAURANG Keith     • oxyCODONE  5 mg Oral Q6H PRN GAURANG Keith     • pantoprazole  40 mg Oral BID ABIGAIL Almanzar GAURANG Martin     • simethicone  80 mg Oral 4x Daily (with meals and at bedtime) Dago PitmanGAURANG     • tamsulosin  0 4 mg Oral Daily With Dinner Dago PitmanGAURANG bowling         Subjective/ HPI: Patient seen and examined  Patients overnight issues or events were reviewed with nursing or staff during rounds or morning huddle session  New or overnight issues include the following:     No new or overnight issues  Offers no complaints    ROS:   A 10 point ROS was performed; negative except as noted above  *Labs /Radiology studies reviewed  *Medications reviewed and reconciled as needed  *Please refer to order section for additional ordered labs studies  *Case discussed with primary attending during morning huddle case rounds    Physical Examination:  Vitals:   Vitals:    01/03/23 1439 01/03/23 1844 01/03/23 2045 01/04/23 0829   BP: 130/70 154/85 143/78 104/70   BP Location: Left arm  Left arm    Pulse: (!) 110  105    Resp:   16    Temp:   99 4 °F (37 4 °C)    TempSrc:   Oral    SpO2:   98%    Weight:       Height:           General Appearance: no distress, conversive  HEENT:  External ear normal   Nose normal w/o drainage  Mucous membranes are moist  Oropharynx is clear  Conjunctiva clear w/o icterus or redness  Neck:  Supple, normal ROM  Lungs: BBS without crackles/wheeze/rhonchi; respirations unlabored with normal inspiratory/expiratory effort  No retractions noted  On RA  CV: regular rate and rhythm; no rubs/murmurs/gallops, PMI normal   ABD: Abdomen is soft  Bowel sounds all quadrants  Nontender with no distention  EXT: no edema  Skin: normal turgor, normal texture, no rashes  Psych: affect normal, mood normal  Neuro: AAO      The above physical exam was reviewed and updated as determined by my evaluation of the patient on 1/4/2023      Invasive Devices     Central Venous Catheter Line  Duration           Port A Cath 03/10/22 Right Chest 299 days          Drain  Duration           Ileostomy LUQ 47 days Abscess Drain Abdomen 26 days    Cholecystostomy Tube 22 days    Abscess Drain Abdomen 14 days    Abscess Drain Back 14 days                   VTE Pharmacologic Prophylaxis: Heparin  Code Status: Level 1 - Full Code  Current Length of Stay: 21 day(s)      Total time spent:  30 minutes with more than 50% spent counseling/coordinating care  Counseling includes discussion with patient re: progress  and discussion with patient of his/her current medical state/information  Coordination of patient's care was performed in conjunction with primary service  Time invested included review of patient's labs, vitals, and management of their comorbidities with continued monitoring  In addition, this patient was discussed with medical team including physician and advanced extenders  The care of the patient was extensively discussed and appropriate treatment plan was formulated unique for this patient  Medical decision making for the day was made by supervising physician unless otherwise noted in their attestation statement  ** Please Note:  voice to text software may have been used in the creation of this document   Although proof errors in transcription or interpretation are a potential of such software**

## 2023-01-04 NOTE — PROGRESS NOTES
01/04/23 0830   Pain Assessment   Pain Assessment Tool FLACC   Pain Rating: FLACC (Rest) - Face 0   Pain Rating: FLACC (Rest) - Legs 0   Pain Rating: FLACC (Rest) - Activity 0   Pain Rating: FLACC (Rest) - Cry 0   Pain Rating: FLACC (Rest) - Consolability 0   Score: FLACC (Rest) 0   Pain Rating: FLACC (Activity) - Face 1   Pain Rating: FLACC (Activity) - Legs 0   Pain Rating: FLACC (Activity) - Activity 0   Pain Rating: FLACC (Activity) - Cry 0   Pain Rating: FLACC (Activity) - Consolability 0   Score: FLACC (Activity) 1   Restrictions/Precautions   Precautions Fall Risk;Contact/isolation;Multiple lines;Supervision on toilet/commode  (VICTORINO drain, ileostomy, IR drain)   Subjective   Subjective "My gas tank is getting empty"   Sit to Lying   Type of Assistance Needed Supervision   Physical Assistance Level No physical assistance   Sit to Lying CARE Score 4   Lying to Sitting on Side of Bed   Type of Assistance Needed Supervision   Physical Assistance Level No physical assistance   Lying to Sitting on Side of Bed CARE Score 4   Sit to Stand   Type of Assistance Needed Supervision   Physical Assistance Level No physical assistance   Comment CS with RW   Sit to Stand CARE Score 4   Bed-Chair Transfer   Type of Assistance Needed Incidental touching   Physical Assistance Level No physical assistance   Comment CGA/CS with RW   Chair/Bed-to-Chair Transfer CARE Score 4   Transfer Bed/Chair/Wheelchair   Limitations Noted In Endurance;LE Strength   Adaptive Equipment Roller Walker   Findings Repeated STS for donning/doffing pants and sweater to go outside   Car Transfer   Type of Assistance Needed Supervision   Physical Assistance Level No physical assistance   Comment CS with RW into patient's personal vehicle performed during FT today   Car Transfer CARE Score 4   Walk 10 Feet   Type of Assistance Needed Supervision; Adaptive equipment   Physical Assistance Level No physical assistance   Comment CS with RW   Walk 10 Feet CARE Score 4   Walk 150 Feet   Reason if not Attempted Safety concerns   Walk 150 Feet CARE Score 88   Walking 10 Feet on Uneven Surfaces   Reason if not Attempted Safety concerns   Walking 10 Feet on Uneven Surfaces CARE Score 88   Ambulation   Primary Mode of Locomotion Prior to Admission Walk   Distance Walked (feet) 30 ft   Assist Device Roller Walker   Gait Pattern Slow Josefina;Decreased foot clearance   Limitations Noted In Heel Strike;Strength;Speed;Swing   Does the patient walk? 2  Yes   Wheel 50 Feet with Two Turns   Type of Assistance Needed Supervision   Physical Assistance Level No physical assistance   Wheel 50 Feet with Two Turns CARE Score 4   Wheel 150 Feet   Type of Assistance Needed Supervision   Physical Assistance Level No physical assistance   Wheel 150 Feet CARE Score 4   Wheelchair mobility   Does the patient use a wheelchair? 1  Yes   Type of Wheelchair Used 1  Manual   Method Right upper extremity; Left upper extremity   Distance Level Surface (feet) 150 ft   Findings Reviewed wheelchair parts management with wife including folding/unfolding to place into car, donning/doffing footrests, and arm rest management  Therapeutic Interventions   Strengthening Supine therex, performed BLE 1 set to tolerance of each in preparation of HEP for home  DF/PF, quad sets, gluteal sets, transverse ABD contraction in hooklying, posterior pelvic tilting in hooklying, heel slides, hooklying hip ADD, hooklying hip ABD against blue TB (need to cute for home  Verbally reviewed and PT demonstrated progression to eventual clamshells and bridging  Assessment   Treatment Assessment Patient engaged in PT treatment session focused on LE strengthening and family training  Patient very emotional at the start of session but was able to be redirected and agreeable to participate in PT session  Patient scheduled for 90 minutes of PT and therefore, rest breaks provided in length as needed   Started the session on PT gym mat for review over supine therex for HEP  Patient with L>RLE weakness and was able to tolerate a variable amount of repetitions based on the exercise chosen  Will need to review seated therex and provide patient with therabands prior to discharge  Second half of session focused on car transfer with wife in her personal car  Patient able to complete with CS and use of RW  Reviewed w/c parts management with wife who verbalized and demonstrated good understanding  Patient can be CS however family recommended to provide CGA/Ada with RW until consistently 100% CS  At this time, discharge scheduled for Monday  DME ordered  HEP to be provided  HHPT to be set up  Problem List Decreased strength;Decreased endurance; Impaired balance;Decreased mobility; Decreased skin integrity   PT Barriers   Functional Limitation Stair negotiation   Plan   Treatment/Interventions Functional transfer training;LE strengthening/ROM; Elevations; Therapeutic exercise; Endurance training;Patient/family training;Equipment eval/education; Bed mobility;Gait training   Progress Progressing toward goals   Recommendation   PT Discharge Recommendation Home with home health rehabilitation   PT Therapy Minutes   PT Time In 0830   PT Time Out 1010   PT Total Time (minutes) 100   PT Mode of treatment - Individual (minutes) 100   PT Mode of treatment - Concurrent (minutes) 0   PT Mode of treatment - Group (minutes) 0   PT Mode of treatment - Co-treat (minutes) 0   PT Mode of Treatment - Total time(minutes) 100 minutes   PT Cumulative Minutes 1250

## 2023-01-04 NOTE — QUICK NOTE
Labs reviewed - alk phos persistently elevated, GGT likewise elevated  Alk phos isoenzymes from 12/17/22 suggest 48% liver fraction and 52% bone fraction  Bilirubin remains normal, 0 88 on most recent check of T bili  IR performed tube check on 1/3/23, patent  Next tube check in 2 weeks, at that time recommend performing cholangiogram  No additional recommendations  GI to sign off at this time, please call us back if additional questions or concerns

## 2023-01-04 NOTE — PROGRESS NOTES
Progress Note - Infectious Disease   [facility] 61 y o  male MRN: [id number]  Unit/Bed#: -01 Encounter: [id number]      Impression/Recommendations:  Sepsis     Evolving 12/17:  WBC, HR   Suspect due to persistent left intra-abdominal infection in setting of complex history below, recently completed antibiotics   ROS and exam otherwise negative   Recent Flu/RSV/COVID PCR, CXR negative   Blood cultures negative   Improved with reinitiation of antibiotics, additional drain placement  Rec:  • Continue antibiotics as below  • Drains as below  • Follow temperatures closely  • Recheck CBC in Thursday  • Supportive care as per the primary service     Intra-abdominal abscess     In the setting complex surgical history as below   Initially developed 11/2022 accompanied by ESBL E  coli bacteremia   Status post exploratory laparotomy, right hemicolectomy, temporary abdominal closure 11/16; re-exploration, partial left colectomy, primary ileocolonic anastomosis with proximal diverting loop ileostomy, midline fascial closure on 11/17   More recently developed abscess in hepatectomy bed, also collection +/- leak at area of prior colonic anastamosis, 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   Status post IR drain into perihepatic collection 12/8   Cultures with ESBL E  Coli   Status post 7 days ertapenem after drain placement through 12/15   Developed recurrent sepsis and repeat CT 12/17 shows hepatic bed collection improved, persistent left intra-abdominal collection   Status post perigastic, perisplenic drains 12/20   Cultures again with ESBL E  Coli   Status post 14 days IV antibiotics, now on doxycycline complicated by nausea, anorexia  Improved with change back to Ertapenem    Rec:  • Continue ertapenem for 3 more days (21 day total)  • Continue drains per IR     Possible acute cholecystitis     Status post percutaneous cholecystostomy tube   Alk phos remains markedly elevated, possibly due to drain itself versus known intrahepatic metastases      Metastatic colon cancer   To liver, possible lung   Status post resection, chemotherapy, more recent hepatic resection and ablation, transverse colon resection      CKD   Baseline Cr 1 4       DM      Anemia   Status post multiple transfusions      Antibiotics:  Ertapenem  Antibiotics #18    Subjective:  Patient seen on AM rounds  Denies fevers, chills, sweats, nausea, vomiting, or diarrhea  Appetite improved with change to Ertapenem  24 Hour Events:  No documented fevers, chills, sweats, nausea, vomiting, or diarrhea  Objective:  Vitals:  Temp:  [97 5 °F (36 4 °C)-99 4 °F (37 4 °C)] 99 4 °F (37 4 °C)  HR:  [] 105  Resp:  [16-18] 16  BP: (104-154)/(62-85) 104/70  SpO2:  [98 %-99 %] 98 %  Temp (24hrs), Av 5 °F (36 9 °C), Min:97 5 °F (36 4 °C), Max:99 4 °F (37 4 °C)  Current: Temperature: 99 4 °F (37 4 °C)    Physical Exam:   General:  No acute distress  Psychiatric:  Awake and alert  Pulmonary:  Normal respiratory excursion without accessory muscle use  Abdomen:  Soft, nontender, drains remain purulent  Extremities:  No edema  Skin:  No rashes    Lab Results:  I have personally reviewed pertinent labs  Results from last 7 days   Lab Units 23  0635 22  0639   POTASSIUM mmol/L 4 1 4 1   CHLORIDE mmol/L 108 112*   CO2 mmol/L 19* 21   BUN mg/dL 20 20   CREATININE mg/dL 1 30 1 23   EGFR ml/min/1 73sq m 60 64   CALCIUM mg/dL 8 5 9 0   AST U/L 54* 46*   ALT U/L 14 16   ALK PHOS U/L 1,315* 1,066*     Results from last 7 days   Lab Units 23  0602 22  0458 22  0639   WBC Thousand/uL 12 67* 10 91* 10 15   HEMOGLOBIN g/dL 8 1* 8 0* 7 0*   PLATELETS Thousands/uL 620* 600* 501*           Imaging Studies:   I have personally reviewed pertinent imaging study reports and images in PACS  EKG, Pathology, and Other Studies:   I have personally reviewed pertinent reports

## 2023-01-04 NOTE — PROGRESS NOTES
01/04/23 1310   Assessment   Treatment Assessment In earlier attempt pt asked to defer session at 1230 to allow for rest  Attempted at 1310 but pt continued to refuse due to extreme fatigue and concerns about IR tube with inc redness noted  Pt does report nursing/physicians are aware  Schedule does not permit for any reattempts at this time  Therapy Time missed   Time missed?  Yes   Amount of time missed 80   Reason for time missed Extreme fatigue  (pt refusal)   Time(s) multiple attempts made 1100, 1120, 1310

## 2023-01-04 NOTE — PROGRESS NOTES
PM&R PROGRESS NOTE:  Norma Tabor 61 y o  male MRN: [id number]  Unit/Bed#: -02 Encounter: [id number]        Rehabilitation Diagnosis: Impairment of mobility, safety and Activities of Daily Living (ADLs) due to Neurologic Conditions:  03 8  Neuromuscular Disorders    HPI: Junior Austin is a 61 y o  male with a medical history of HTN, HLD, GERD, and ventral hernia that presented to [facility] on 11/7 for an elective surgical procedure d/t metastatic colon adenocarcinoma by Dr Ledy Ferguson  Patient presented to hem/onc 2/22 after CT abdomen showed transverse colonic apple core lesion and innumerable hepatic metastases  MRI revealed multiple hepatic metastasis with smaller lesions in left lobe and larger lesions in right lobe  Patient completed chemotherapy, colostomy at that time  On 11/7 patient underwent exploratory laparotomy, segment 3 liver resection, ablation, intraoperative ultrasound, and colostomy reversal  This was c/b left upper quadrant hematoma, imaging showed suspected leak from transverse colon anastomosis  On 11/16, patient underwent exp lap which revealed LUQ infected hematoma, defect in transverse colon anastomosis with extravasation of succus  Massively dilated cecum requiring resection  He had a right hemicolectomy, left in discontinuity and temporary abdominal closure  On 11/17, re-exploration, partial descending colectomy, primary colonic anastomosis created with diverting loop ileostomy and midline wound VAC placement  He completed course of IV abx for ESBL bacteremia from abdominal abscess  Midline abdominal incision wet to dry dressings and packing to old stoma site  12/6 abdominal incision noted to have yellow drainage  CT abdomen showed abdominal abscess and distended gallbladder  Patient underwent percutaneous cholecystostomy tube insertion and hepatectomy abscess drainage  ID consulted, IV abx for 1 week  Nephrology consulted d/t ALEXY, creatinine baseline 1 2-1 4   On 12/14 patient experienced chest pain, appeared musculoskeletal with no further work-up  Patient deemed medically stable and admitted to [facility] on 12/14/2022  SUBJECTIVE: Patient seen face to face  No acute issues  Patient reports nausea improved with change in antibiotic  Did vomit dinner yesterday  Appetite improved, able to eat breakfast  Abdomen remains tender, pain well controlled with current regimen  No CP, SOB, fever, chills, N/V, abdominal pain  Tube #3 new sanguineous drainage when flushed  IR made aware, awaiting evaluation  ASSESSMENT: Stable, progressing    PLAN:  1  Surgical oncology aware of increased alkaline phosphatase and Gamma GT, okay with cholangiogram with 2 week tube check  2  Surgical oncology agreeable to Santyl to incision-superior aspect slough, ABD, paper tape  3  Nausea improved, continue to monitor appetite/meal completions  4  Bacteremia- Ertapenem for 3 more days per ID    Rehabilitation    Team conference: The team discussed patient's functional status, goals, and barriers  • Functional deficits:  Decreased foot clearance, shuffle gait  • Continue current rehabilitation plan of care to maximize function      • Functional update:   Physical Therapy Occupational Therapy Speech Therapy   Weight Bearing Status: Full Weight Bearing  Transfers: Incidental Touching  Bed Mobility: Supervision  Amulation Distance (ft): 30 feet  Ambulation: Incidental Touching  Assistive Device for Ambulation: Roller Walker  Wheelchair Mobility Distance: 50 ft  Wheelchair Mobility: Supervision  Number of Stairs: 2  Assistive Device for Stairs: Bilateral Hand Rails  Stair Assistance: Minimal Assistance  Discharge Recommendations: Home with:  [address] with[de-identified] Family Support, First Floor Setup, Home Physical Therapy, 24 Hour Assisteance, 24 Hour Supervision   Eating: Independent  Grooming: Supervision  Bathing: Incidental Touching  Bathing: Incidental Touching  Upper Body Dressing: Supervision  Lower Body Dressing: Incidental Touching  Toileting: Incidental Touching  Tub/Shower Transfer:  (N/A at this time, multiple drains/lines)  Toilet Transfer: Incidental Touching  Cognition: Within Defined Limits  Orientation: Person, Place, Time, Situation               · Estimated Discharge: ADD 01/09 with home PT, OT, RN    Pain  • Tylenol, oxycodone    DVT prophylaxis  • heparin    Bladder plan  • Continent    Bowel plan  • Continent      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 liver ([facility] [address] )  Assessment & Plan  · MRI-multiple hepatic metastasis; smaller lesions in left lobe, larger lesions in right lobe  · 11/7 Exp Lap, resection of hepatic segment 3, resection of transverse colon with reversal of loop colostomy (Dr Geovanna Rosen)  · 11/16- right hemicolectomy, left discontinuity and temporary abdominal closure device placed  · 11/17- re-exploration, partial descending colectomy, primary colocolonic anastomosis with diverting loop ileostomy, midline VAC placement  · CT showed abdominal abscess and distended gallbladder  · 12/8- IR percutaneous cholecystostomy tube, hepatectomy abscess drain placement  ·  IR on 12/20 additional drains placed for a perisplenic abscess as well as a splenic recess abscess  · Total of 3 drains in place  * Malignant neoplasm of transverse colon [facility])  Assessment & Plan  · Transverse colonic apple core lesion and innumerable hepatic metastases  · S/p Colostomy placement 2/22  · RCW port-a-cath, accessed  · S/p palliative chemo  · Follows with Dr Felice Duke (palliative)  · Follows hem/onc (Dr Rubens Ballesteros)    Elevated alkaline phosphatase level  Assessment & Plan  1315, (previous 1066)  Hepatic mets  Follow level  ?bone involvement - no evidence of ROM limitations or pain complaints on survey    Follow up with Surg Onc    Dehiscence of incision  Assessment & Plan  Improving  Wound Care following and surgical oncology following   Slough present  [place] ordered 12/29/22  Surgical oncology- no signs of infection, recommend [place], ABD, paper tape     [address] as follows:   Skin Care Plan:  1-Midline Incision and Right Abdomen Wounds: Cleanse wounds with NS and pat dry  Apply Santyl to slough tissue on wound bed nickel thick  Cover with ABD and secure with minimal tape  Change daily or PRN soilage or displacement  2-Turn/reposition q2h or when medically stable for pressure re-distribution on skin   3-Elevate heels to offload pressure  4-Moisturize skin daily with skin nourishing cream  5-Ehob cushion in chair when out of bed  6-Preventative Hydraguard to bilateral heels and bilateral sacro-buttocks BID and PRN          Leukocytosis  Assessment & Plan  WBC 12 67 (previous 10 15)    Acute pain  Assessment & Plan  Managed on Scheduled Tylenol 975 mg [id number]  Managed on Robaxin 500 mg BId  Oxycodone IR PRN  Moderately managed     Abscess  Assessment & Plan  · Abdominal abscess- ESBL E Coli - Contact precautions  · Zosyn completed  · Completed Ertapenum course 12/28/22  · Starting Doxycycline 100 mg Q12h 12/30/22 per ID consultants      · 3 drains placed by IR  · Monitor 3 drain output:  · Abscess drain abdomen A: 0 cc   · Abscess drain back: 0-10 cc  · Abscess drain abdomen B: 15 - 20cc    1/3- tube check w/ IR  Tube position check  Patient having some discomfort in left abdomen while supine - per IR tubes in place, bulbs changed, recheck tube placement in 2 weeks  Sepsis ([address] )  Assessment & Plan  · SIRS versus sepsis at this time given his vitals, leukocytosis, and complicated infection history  · Mgmt per ID, IM  · 12/20-IR perisplenic, perigastric drain placement for 2 additional abscesses-  · Perigastric abscess +E  Coli ESBL  · Blood cultures negative     Acute on chronic kidney failure (HCC)  Assessment & Plan  · Creatinine baseline 1 2-1 4  · Cr = 1 3  · Monitor BMP    Bacteremia  Assessment & Plan  · Remains on Abx     Iron deficiency anemia, unspecified  Assessment & Plan  · Hbg = 8 1  · 12/15-1 unit PRBc  · 12/16- symptomatic- 1 unit PRBc  · 12/28- 1 unit PRBc  · Monitor CBC    Obesity (BMI 30-39  9)  Assessment & Plan  · BMI 33 0    Benign essential hypertension  Assessment & Plan  · Home: Norvasc 5 mg BID  · Here: Norvasc 5 mg BID  · Adjust medication as needed    Type 2 diabetes mellitus without complication, with long-term current use of insulin (HCC)  Assessment & Plan  · HbgA1c 8 0 (9/22)  · Home monitoring with Master Villanueva  · Home: Lantus 12 units, Humalog 3 units with meals, Januvia  · Here: Lantus 6 units,  Humalog 6 units with meals, SSI, Lantus   · Follow with PCP (Dr Zaheer Couch)        [address] consultants medical co-management  Labs, medications, and imaging reviewed  ROS:  A ten point review of systems was completed on 01/04/23 and pertinent positives are listed in subjective section  All other systems reviewed were negative  Review of Systems   Constitutional: Negative for chills and fever  HENT: Negative for congestion and sore throat  Respiratory: Negative for cough and shortness of breath  Cardiovascular: Negative for chest pain and palpitations  Gastrointestinal: Negative for abdominal pain, constipation, diarrhea and nausea  Genitourinary: Negative for difficulty urinating and dysuria  Musculoskeletal: Positive for back pain  Negative for arthralgias  Skin: Negative for color change and rash  Neurological: Negative for dizziness and headaches  All other systems reviewed and are negative  OBJECTIVE:   /70   Pulse 105   Temp 99 4 °F (37 4 °C) (Oral)   Resp 16   Ht 5' 10" (1 778 m)   Wt 104 kg (230 lb)   SpO2 98%   BMI 33 00 kg/m²     Physical Exam  Constitutional:       Appearance: Normal appearance  HENT:      Head: Normocephalic and atraumatic  Mouth/Throat:      Mouth: Mucous membranes are moist    Cardiovascular:      Rate and Rhythm: Normal rate and regular rhythm  Pulses: Normal pulses        Heart sounds: Normal heart sounds  Pulmonary:      Effort: Pulmonary effort is normal       Breath sounds: Normal breath sounds  Abdominal:      General: Bowel sounds are normal       Palpations: Abdomen is soft  Tenderness: There is abdominal tenderness  Musculoskeletal:         General: Normal range of motion  Cervical back: Normal range of motion  Right lower leg: Edema present  Left lower leg: Edema present  Skin:     General: Skin is warm and dry  Capillary Refill: Capillary refill takes less than 2 seconds  Neurological:      Mental Status: He is alert and oriented to person, place, and time     Psychiatric:         Mood and Affect: Mood normal          Judgment: Judgment normal         Lab Results   Component Value Date    WBC 12 67 (H) 01/02/2023    HGB 8 1 (L) 01/02/2023    HCT 26 9 (L) 01/02/2023    MCV 86 01/02/2023     (H) 01/02/2023     Lab Results   Component Value Date    SODIUM 136 01/03/2023    K 4 1 01/03/2023     01/03/2023    CO2 19 (L) 01/03/2023    BUN 20 01/03/2023    CREATININE 1 30 01/03/2023    GLUC 139 01/03/2023    CALCIUM 8 5 01/03/2023     Lab Results   Component Value Date    INR 1 12 12/19/2022    INR 1 10 11/12/2022    INR 1 24 (H) 11/09/2022    PROTIME 14 7 (H) 12/19/2022    PROTIME 14 4 11/12/2022    PROTIME 15 8 (H) 11/09/2022       Current Facility-Administered Medications:   •  acetaminophen (TYLENOL) tablet 975 mg, 975 mg, Oral, Q8H [address], GAURANG Keith, 975 mg at 01/04/23 6346  •  amLODIPine (NORVASC) tablet 5 mg, 5 mg, Oral, BID, GAURANG Keith, 5 mg at 01/03/23 1844  •  aspirin chewable tablet 81 mg, 81 mg, Oral, Daily, GAURANG Keith, 81 mg at 01/04/23 2577  •  atorvastatin (LIPITOR) tablet 40 mg, 40 mg, Oral, Daily, GAURANG Keith, 40 mg at 01/04/23 0140  •  calcium carbonate (TUMS) chewable tablet 500 mg, 500 mg, Oral, BID PRN, GAURANG Keith, 500 mg at 01/03/23 1406  •  cholecalciferol (VITAMIN D3) tablet 400 Units, 400 Units, Oral, Daily, GAURANG Keith, 400 Units at 01/04/23 0830  •  collagenase (SANTYL) ointment, , Topical, Daily, Chris Camejo MD, Given at 01/04/23 7222  •  ertapenem (INVanz) 1,000 mg in sodium chloride 0 9 % 50 mL IVPB, 1,000 mg, Intravenous, Q24H, Sandrita Ballesteros MD, Last Rate: 100 mL/hr at 01/03/23 2114, 1,000 mg at 01/03/23 2114  •  heparin (porcine) subcutaneous injection 5,000 Units, 5,000 Units, Subcutaneous, Q8H [address], GAURANG Keith, 5,000 Units at 01/04/23 4731  •  insulin glargine (LANTUS) subcutaneous injection 10 Units 0 1 mL, 10 Units, Subcutaneous, HS, GAURANG Cool, 10 Units at 01/03/23 2120  •  insulin lispro (HumaLOG) 100 units/mL subcutaneous injection 1-5 Units, 1-5 Units, Subcutaneous, HS, GAURANG Keith, 1 Units at 12/31/22 2142  •  insulin lispro (HumaLOG) 100 units/mL subcutaneous injection 1-6 Units, 1-6 Units, Subcutaneous, TID AC, 1 Units at 01/04/23 1208 **AND** Fingerstick Glucose (POCT), , , TID AC, GAURANG Keith  •  insulin lispro (HumaLOG) 100 units/mL subcutaneous injection 6 Units, 6 Units, Subcutaneous, TID With Meals, GAURANG Barroso, 6 Units at 01/04/23 1208  •  iohexol (OMNIPAQUE) 350 MG/ML injection (SINGLE-DOSE) 50 mL, 50 mL, Intravenous, Once in imaging, Sal Fuchs MD  •  melatonin tablet 3 mg, 3 mg, Oral, HS, GAURANG Keith, 3 mg at 01/03/23 2120  •  methocarbamol (ROBAXIN) tablet 500 mg, 500 mg, Oral, BID, GAURANG Keith, 500 mg at 01/04/23 1907  •  multivitamin-minerals (CENTRUM) tablet 1 tablet, 1 tablet, Oral, Daily, GAURANG Keith, 1 tablet at 01/04/23 4401  •  ondansetron (ZOFRAN-ODT) dispersible tablet 4 mg, 4 mg, Oral, Q6H PRN, GAURANG Keith, 4 mg at 01/03/23 2107  •  ondansetron (ZOFRAN-ODT) dispersible tablet 4 mg, 4 mg, Oral, BID, GAURANG Cool, 4 mg at 01/04/23 3373  •  oxyCODONE (ROXICODONE) immediate release tablet 10 mg, 10 mg, Oral, Q6H PRN, GAURANG Keith, 10 mg at 01/02/23 2103  •  oxyCODONE (ROXICODONE) IR tablet 2 5 mg, 2 5 mg, Oral, Q3H PRN, GAURANG Keith, 2 5 mg at 12/25/22 1911  •  oxyCODONE (ROXICODONE) IR tablet 5 mg, 5 mg, Oral, Q6H PRN, GAURANG Keith, 5 mg at 01/03/23 2131  •  pantoprazole (PROTONIX) EC tablet 40 mg, 40 mg, Oral, BID AC, GAURANG Keith, 40 mg at 01/04/23 0450  •  simethicone (MYLICON) chewable tablet 80 mg, 80 mg, Oral, 4x Daily (with meals and at bedtime), GAURANG Keith, 80 mg at 01/04/23 1158  •  tamsulosin (FLOMAX) capsule 0 4 mg, 0 4 mg, Oral, Daily With Dinner, GAURANG [place], 0 4 mg at 01/03/23 3733    Past Medical History:   Diagnosis Date   • Abdominal pain 03/12/2022   • Acute renal failure ([place] [address] )     [id number] resolved   • Cancer ([place] [address] )    • Diabetes mellitus ([place] [address] )    • Enteritis 08/23/2016   • Gastroparesis due to DM ([place] [address] ) 08/23/2016   • GERD (gastroesophageal reflux disease)    • Hernia, ventral 08/04/2016   • Hyperlipidemia    • Hypertension    • Morbid obesity ([place] [address] ) 04/17/2018   • Postoperative visit 03/02/2022   • SIRS (systemic inflammatory response syndrome) ([place] [address] ) 03/12/2022   • Snoring    • Stage 3a chronic kidney disease ([place] [address] ) 02/19/2022       Patient Active Problem List    Diagnosis Date Noted   • 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 liver ([place] [address] ) 03/02/2022   • Malignant neoplasm of transverse colon ([place] [address] ) 03/01/2022   • Dehiscence of incision 12/30/2022   • Elevated alkaline phosphatase level 12/30/2022   • Leukocytosis 12/29/2022   • Abscess 12/27/2022   • Acute pain 12/27/2022   • Sepsis ([facility] ) 12/17/2022   • Severe protein-calorie malnutrition ([facility] ) 12/14/2022   • Acute on chronic kidney failure ([facility] ) 12/14/2022   • Flash pulmonary edema ([facility] ) 11/12/2022   • MR (mitral regurgitation) 11/12/2022   • Bacteremia 11/12/2022   • ESBL (extended spectrum beta-lactamase) producing bacteria infection 11/12/2022   • Acute respiratory failure with hypoxia ([facility] ) 11/12/2022   • Encephalopathy 11/09/2022   • Cervical radiculopathy 10/26/2022   • Other fatigue 06/15/2022   • Colostomy prolapse ([facility] [address] ) 05/27/2022   • Colon cancer metastasized to liver ([facility] ) 03/12/2022   • Iron deficiency anemia, unspecified 03/01/2022   • Thrombocytosis 02/21/2022   • Hypokalemia 02/19/2022   • Transaminitis 02/19/2022   • Type 2 diabetes mellitus with hyperlipidemia ([facility] [address] ) 02/18/2022   • Colonic mass 02/18/2022   • Microcytic anemia 02/18/2022   • Left ureteral calculus 01/30/2020   • Incarcerated umbilical hernia 33/18/2833   • Testicular hypogonadism 06/19/2017   • Low testosterone 05/30/2017   • Type 2 diabetes mellitus without complication, with long-term current use of insulin ([facility] ) 08/23/2016   • Benign essential hypertension 08/23/2016   • Mixed hyperlipidemia 08/23/2016   • Erectile dysfunction 07/11/2016   • Obesity (BMI 30-39 9) 07/11/2016      GAURANG Jimenez  Physical Medicine and Chris Luu    Total time spent:  35 minutes, with more than 50% spent counseling/coordinating care  Counseling includes discussion with patient re: progress in therapies, functional issues observed by therapy staff, and discussion with patient his/her current medical state/wellbeing  Coordination of patient's care was performed in conjunction with Internal Medicine service to monitor patient's labs, vitals, and management of their comorbidities  In addition, this patient was discussed by the interdisciplinary team in weekly case conference today  The care of the patient was extensively discussed with all care providers and an appropriate rehabilitation plan was formulated unique for this patient  Barriers were identified preventing progression of therapy and appropriate interventions were discussed with each discipline  Please see the team note for input from all disciplines regarding barriers, intervention, and discharge planning

## 2023-01-04 NOTE — CASE MANAGEMENT
Clinical update faxed to claudia at [facility] via epic, [phone], requesting coverage through 1/8 with expected dc 1/9  Awaiting determination  Met w/pt and reviewed team update and confirmed dc for Monday 1/9  Pt did not dispute  Cm reviewed recommendations for contd [facility] services and pt is requesting [facility]  Cm explained referral process and another opiton may be needed if they are having any staffing issues  Pt understood  Pt confirmed his wife has been in for training and it has been going well  Cm reviewed dme order for pt and his wife would be contacted re: any delivery or copayment issues

## 2023-01-04 NOTE — TEAM CONFERENCE
Acute [facility] Conference Note  Date: 1/4/2023   Time: 10:50 AM       Patient Name:  Sana Zaragoza       Medical Record Number: [id number]   YOB: 1965  Sex: Male          Room/Bed:  [facility]4/[facility] 964-01  Payor Info:  Payor: Blayne Watters / Plan: Blayne Watters / Product Type: HMO Commercial /      Admitting Diagnosis: Critical illness myopathy [G72 81]   Admit Date/Time:  12/14/2022  3:18 PM  Admission Comments: No comment available     Primary Diagnosis:  Malignant neoplasm of transverse colon ([facility] )  Principal Problem: Malignant neoplasm of transverse colon [facility])    Patient Active Problem List    Diagnosis Date Noted   • Dehiscence of incision 12/30/2022   • Elevated alkaline phosphatase level 12/30/2022   • Leukocytosis 12/29/2022   • Abscess 12/27/2022   • Acute pain 12/27/2022   • Sepsis ([facility] ) 12/17/2022   • Severe protein-calorie malnutrition ([facility] ) 12/14/2022   • Acute on chronic kidney failure ([facility] ) 12/14/2022   • Flash pulmonary edema ([facility] ) 11/12/2022   • MR (mitral regurgitation) 11/12/2022   • Bacteremia 11/12/2022   • ESBL (extended spectrum beta-lactamase) producing bacteria infection 11/12/2022   • Acute respiratory failure with hypoxia ([facility] ) 11/12/2022   • Encephalopathy 11/09/2022   • Cervical radiculopathy 10/26/2022   • Other fatigue 06/15/2022   • Colostomy prolapse ([facility] ) 05/27/2022   • Colon cancer metastasized to liver ([facility] ) 03/12/2022   • 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 liver ([facility] ) 03/02/2022   • Iron deficiency anemia, unspecified 03/01/2022   • Malignant neoplasm of transverse colon ([facility] ) 03/01/2022   • Thrombocytosis 02/21/2022   • Hypokalemia 02/19/2022   • Transaminitis 02/19/2022   • Type 2 diabetes mellitus with hyperlipidemia ([facility] ) 02/18/2022   • Colonic mass 02/18/2022   • Microcytic anemia 02/18/2022   • Left ureteral calculus 01/30/2020   • Incarcerated umbilical hernia 80/75/7716   • Testicular hypogonadism 06/19/2017   • Low testosterone 05/30/2017   • Type 2 diabetes mellitus without complication, with long-term current use of insulin ([facility] ) 08/23/2016   • Benign essential hypertension 08/23/2016   • Mixed hyperlipidemia 08/23/2016   • Erectile dysfunction 07/11/2016   • Obesity (BMI 30-39 9) 07/11/2016       Physical Therapy:    Weight Bearing Status: Full Weight Bearing  Transfers: Incidental Touching  Bed Mobility: Supervision  Amulation Distance (ft): 30 feet  Ambulation: Incidental Touching  Assistive Device for Ambulation: Roller Walker  Wheelchair Mobility Distance: 50 ft  Wheelchair Mobility: Supervision  Number of Stairs: 2  Assistive Device for Stairs: Bilateral Hand Rails  Stair Assistance: Minimal Assistance  Discharge Recommendations: Home with:  [address] with[de-identified] Family Support, First Floor Setup, Home Physical Therapy, 24 Hour Assisteance, 24 Hour Supervision        Patient making progress with skilled PT intervention thus far however still remains with barriers of medical deconditioning, pain, fatigue, LE weakness and imbalance  These deficits negatively impact patient's ability to engage in acute rehab program, however improving since evaluation  Patient has 2 vs 3 ADRIAN home and a FF to second floor  Pt is performing step but needs more strengthen and endurance to meet DC goals  FT needed before DC date and awaiting for team recommend  Pt did reports his getting all his DME for first floor step up  Tentative plan for discharge Monday; family training in progress  HHPT to be set up         Occupational Therapy:  Eating: Independent  Grooming: Supervision  Bathing: Incidental Touching  Bathing: Incidental Touching  Upper Body Dressing: Supervision  Lower Body Dressing: Incidental Touching  Toileting: Incidental Touching  Tub/Shower Transfer:  (N/A at this time, multiple drains/lines)  Toilet Transfer: Incidental Touching  Cognition: Within Defined Limits  Orientation: Person, Place, Time, Situation  Discharge Recommendations: Home with:  [address] with[de-identified] Family Support, Home Occupational Therapy       Occupational Therapy Weekly Team Note    Pt continues to make gradual progress towards OT goals  Family training has occurred this week with pt's wife in preparation for d/c  Both pt and wife feeling increasingly confident in regards to d/c plan  Pt continues to be limited by dec standing julia/balance, endurance, strength and act julia  He is currently functioning at an overall CGA level with RW  Will benefit from continued skilled OT services following POC with focus on above mentioned deficits to increase safety and independence with I/ADL tasks  Tentative d/c set for 1/9 home with home ot  Speech Therapy:           No notes on file    Nursing Notes:  Appetite: Fair  Diet Type: Diabetic                      Diet Patient/Family Education Complete: Yes    Type of Wound (LDA): Wound                    Type of Wound Patient/Family Education: Yes  Bladder: Continent     Bladder Patient/Family Education: Yes  Bowel: Ostomy  Ostomy Type: Ileostomy  Bowel Patient/Family Education: Yes  Pain Location/Orientation: Orientation: Bilateral, Location: Abdomen  Pain Score: 0                       Hospital Pain Intervention(s): Medication (See MAR), Repositioned  Pain Patient/Family Education: Yes  Medication Management/Safety  Injectable: Insulin  Safe Administration: Yes (on going)  Medication Patient/Family Education Complete: No (on going)    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 -s/p hepatic resection and reversal of colostomy on 11/7, s/p right hemicolectomy with partial descending colectomy colocolonic anastomosis with loop ileostomy and mid line abdominal VAC placement 11/16, Continue local wound care; WC following, CT  Abd/pelvis 12/17 = loculated collection along splenic recess    Has perisplenic hilum collection as well --> to IR 12/20 for drain placement in both perigastric and perisplenic area and cx sent from both areas = Ecoli, Flush drains with 10ml qd, 1/1/23 VICTORINO drainage = 0ml, S-O following and Dr Ledy Ferguson was in to see pt 1/2/23, Tube check (incl perc alvina tube) done today this AM  Acute cholecystitis - s/p percutaneous tube placement 12/8 with tube check on 12/12, 12/14, GI saw due to alk phos elevation = felt 2/2 infiltrative disease rather than focal biliary obstruction  AMA was negative, Alkaline phosphatase isoenzyme sent 12/17 (48% liver/52% bone) = GI  recommended continuing to monitor his LFTs and defer to surgical oncology for further w/u and plan  Last 3 APs were [phone]  Will recheck 1/3, TB improving = to consider MRCP if total bili trends back up but was normal 12/30, Alvina tube = 20 ml 1/1/23  Drainage still has dark blood in it but seems to slowly becoming less, Perc alvina tube check done today 1/3, Did discuss alk phos levels with Dr Tisha Parsons 1/1/23 = stable but he suggested a cholangiogram be done with the tube check which was relayed to Dr Jamey Deluna over the weekend --> wasn't done today = d/w GI and they said do at next tube check in 2 weeks but will drop a note  They also wanted a GGT now  Bacteremia/abdominal abscess - ID following , s/p Ertapenem was switched to Doxycycline per ID as of 12/30, Blood cultures negative, CT abdomen and pelvis as above, + nausea since starting Doxy on 12/30  Lasts 1-2 hrs and was affecting appetite pt said, On 1/2/23, changed Zofran prn to be given scheduled pre-Doxy = was not effective however, Dr Pato Moreno changed back to Ertapenem today  Hypertension - On Norvasc 5mg BID, stable  Diabetes mellitus - On Lantus 10U qhs/Lispro 6U TID, Continue DM diet and QID Accuchecks/SSI, Increased Lantus to 10U qhs from 6U to start 12/31/22, Eating/meal completion is erratic and therefore BSs difficult, May have to consider stopping Lispro WM and increasing coverage scale instead   Acute on chronic renal failure - Stage III; baseline 1 2-1 4, Lisinopril currently on hold, s/p NSS IVF with improvement of creat =  back down to 1 2 12/30, Stable at 1 3 now  Anemia - Multifactorial due to recent infection, surgery, CKD stage III - Transfused on 12/15, 12/16/22 and 12/30, Hemoglobin 1/2/23 is stable at 8 1  Atypical chest pain - With elevation in troponin/EKG changes on acute side, Cardiology saw then and cleared pt for discharge, Did not recommend any further work up, No further chest pain  Situational depression - Neuropsychology consulted, Patient not interested in medications at this point in time  Malnutrition - Glucerna makes him nauseated, On 12/15, ordered double protein  Pleural effusion - CXR on 12/14 showed small left pleural effusion and was suspect for CHF, ECHO 11/9/22 = LVEF 55%, unable to assess diastolic function, mild TR  L sided pleuritic pain - located near drain site and per patient only occurs at night, No evidence of infection  This week, we will continue to encourage independence with ADL's  We will continue to monitor labs and vital signs  We will continue to educate pt/family about repositioning to prevent skin breakdown  We will continue to monitor for constipation and medicate as ordered  We will continue to increase safety awareness with transfers and keep patient free from falls  Will continue to provide pt/family teaching with diabetic teaching  We will monitor incision(wound) for healing and s/s of healing  We will continue to monitor for adequate pain control  We will continue to teach pt/ family on care of ileostomy and drainage bags  Case Management:     Discharge Planning  Living Arrangements: Lives w/ Spouse/significant other, Lives w/ Children  Support Systems: Spouse/significant other, Son, Daughter  Assistance Needed: tbd  Type of Current Residence: Private residence  Current Home Care Services: No  Pt continues to make gains functionally but medical issues continue to plague pt  A dc date is scheduled for 1/9, and pt has expressed concern about the date   Family training has been initiated and is ongoing this week  Due to the amount of drains pt will require hhc services on dc  Is the patient actively participating in therapies? yes  List any modifications to the treatment plan:     Barriers Interventions   Oral meds converted back to iv due to nausea Following for potential home  need   Drains wound care Family/pt training for dc to home   Energy, fatigue, activity tolerance Energy conservation education             Is the patient making expected progress toward goals? yes  List any update or changes to goals:     Medical Goals: Patient will be medically stable for discharge to [facility] [place] upon completion of rehab program and Patient will be able to manage medical conditions and comorbid conditions with medications and follow up upon completion of rehab program    Weekly Team Goals:   Rehab Team Goals  ADL Team Goal: Patient will require assist with ADLs with least restrictive device upon completion of rehab program  Bowel/Bladder Team Goal: Patient will require assist with bladder/bowel management with least restrictive device upon completion of rehab program  Transfer Team Goal: Patient will be independent with transfers with least restrictive device upon completion of rehab program  Locomotion Team Goal: Patient will be independent with locomotion with least restrictive device upon completion of rehab program    Discussion: pt presents with the above barriers and is continuing with the program  p tis supervision with bed mobility  Close supervision with stand pivots depending on fatigue  Contact guard/min a with curb/step  Pt will return home w/spouse with contd hhc services  Pt will require a w/c and hospital bed  Overall adls are min to supervision depending on energy level  Abigail Dior for RN PT and OT    Anticipated Discharge Date:  1/9/2023  [facility] Team Members Present: The following team members are supervising care for this patient and were present during this Weekly Team Conference      Physician: Dr Toni Rodriguez MD  : Malgorzata Redding MSW  Registered Nurse: Arnaud Rojas RN  Physical Therapist: Caitlin Hutchison DPT  Occupational Therapist: Lulu Luevano MS, OTR/L  Speech Therapist:

## 2023-01-04 NOTE — WOUND OSTOMY CARE
Progress Note - Wound   Norma Tabor 61 y o  male MRN: [id number]  Unit/Bed#: [facility] [place] 075-26 Encounter: [id number]        Assessment:   Patient seen today for wound care follow up visit and family teaching with patient's wife to prep patient for discharge  Patient and wife went over ostomy bag change previous day and are confident in ostomy care as patient had ostomy prior to admission  1  Midline abdominal incision-  Full thickness wound with 90% well adhered slough tissue, 5% pink granular tissue, and 5% skin  Demonstrated applying santyl, nickel thick, to wound bed, after cleansing wound with normal saline, then covering with ABD pad  Patient and wife verbalized understanding and all questions answered during teaching session  Supplies left at bedside and patient and wife plan to have visiting nurses post discharge  No induration, fluctuance, odor, warmth/temperature differences, redness, or purulence noted to the above noted wounds and skin areas assessed  New dressings applied per orders listed below  Patient tolerated well- no s/s of non-verbal pain or discomfort observed during the encounter  Bedside nurse aware of plan of care  See flow sheets for more detailed assessment findings  Wound care will continue to follow  Skin Care Plan:  1-Midline Incision and Right Abdomen Wounds: Cleanse wounds with NS and pat dry  Apply Santyl to slough tissue on wound bed nickel thick  Cover with ABD and secure with minimal tape  Change daily or PRN soilage or displacement  2-Turn/reposition q2h or when medically stable for pressure re-distribution on skin   3-Elevate heels to offload pressure  4-Moisturize skin daily with skin nourishing cream  5-Ehob cushion in chair when out of bed  6-Preventative Hydraguard to bilateral heels and bilateral sacro-buttocks BID and PRN    Ostomy Care:  1  Peel back pouch using push-pull method, may use non-alcohol adhesive remover(Purple and white package)    2  Use warm water only to cleanse skin around the stoma (james-stomal skin)  3  Make sure all adhesive residue is removed and skin is dry and not oily  4  Measure stoma size using measuring guide and trace correct measurements onto back of pouch  5  Then mold the backing of pouch out to correct shape/size  6  Use 3M no sting barrier film to prep the skin around the stoma  7  Place pouch over stoma and onto skin  8  Use warmth of hand to apply gentle pressure to help backing of pouch to adhere well to skin  9  Empty pouch when 1/3 full  10  Change pouch every 4-5 days or if signs of leaking  11  Will continue to follow patient while an inpatient for Ostomy teaching/education  Wound 11/07/22 Abdomen N/A (Active)   Wound Image   01/04/23 1023   Wound Description Slough; Epithelialization;Granulation tissue;Fragile 01/04/23 1023   James-wound Assessment Scar Tissue 01/04/23 1023   Wound Length (cm) 17 cm 01/04/23 1023   Wound Width (cm) 2 cm 01/04/23 1023   Wound Depth (cm) 0 8 cm 01/04/23 1023   Wound Surface Area (cm^2) 34 cm^2 01/04/23 1023   Wound Volume (cm^3) 27 2 cm^3 01/04/23 1023   Calculated Wound Volume (cm^3) 27 2 cm^3 01/04/23 1023   Change in Wound Size % 94 44 01/04/23 1023   Tunneling 0 cm 01/04/23 1023   Tunneling in depth located at 0 01/04/23 1023   Undermining 0 9 01/04/23 1023   Undermining is depth extending from 11-1 01/04/23 1023   Wound Site Closure DONNA 01/04/23 1023   Drainage Amount Small 01/04/23 1023   Drainage Description Serosanguineous 01/04/23 1023   Non-staged Wound Description Full thickness 01/04/23 1023   Treatments Cleansed;Irrigation with NSS 01/04/23 1023   Dressing Enzymatic debrider;ABD 01/04/23 1023   Wound packed? No 01/04/23 1023   Packing- # removed 0 01/04/23 1023   Packing- # inserted 0 01/04/23 1023   Dressing Changed New 01/04/23 1023   Patient Tolerance Tolerated well 01/04/23 1023   Dressing Status Clean;Dry; Intact 01/04/23 [phone] Ja Otoole, RN, Ceci Casarez

## 2023-01-04 NOTE — PROGRESS NOTES
01/04/23 1120   Assessment   Treatment Assessment Attempted to see pt at scheduled 11AM session, however, pt's wife was not present at that time for FT as she briefly stepped off the unit  Pt was very lethargic at that time and requested to defer any tx but was receptive to me touching base with his wife when she was present  Checked back at 11:20 and both pt and wife were present at that time  Offered continued FT, however, they both confirm feeling confident in d/c plan post Monday's FT and have no OT concerns at this time  Report largest concern was nursing needs and car txfer but per pt and wife report they felt "the car txfer went really good " They state they do not feel they need any further training in prep for d/c  Notified both Supervising OTR/L and PT  Will f/u in afternoon as tolerated for treatment session as pt had received lunch at this time  OT Family training done with: Wife Vika present  OT Therapy Minutes   OT Time In 1120   OT Time Out 1130   OT Total Time (minutes) 10   OT Mode of treatment - Individual (minutes) 10   OT Mode of treatment - Concurrent (minutes) 0   OT Mode of treatment - Group (minutes) 0   OT Mode of treatment - Co-treat (minutes) 0   OT Mode of Treatment - Total time(minutes) 10 minutes   OT Cumulative Minutes 0252   Therapy Time missed   Time missed?  No

## 2023-01-04 NOTE — PLAN OF CARE
Problem: Prexisting or High Potential for Compromised Skin Integrity  Goal: Skin integrity is maintained or improved  Description: INTERVENTIONS:  - Identify patients at risk for skin breakdown  - Assess and monitor skin integrity  - Assess and monitor nutrition and hydration status  - Monitor labs   - Assess for incontinence   - Turn and reposition patient  - Assist with mobility/ambulation  - Relieve pressure over bony prominences  - Avoid friction and shearing  - Provide appropriate hygiene as needed including keeping skin clean and dry  - Evaluate need for skin moisturizer/barrier cream  - Collaborate with interdisciplinary team   - Patient/family teaching  - Consider wound care consult   Outcome: Progressing     Problem: Potential for Falls  Goal: Patient will remain free of falls  Description: INTERVENTIONS:  - Educate patient/family on patient safety including physical limitations  - Instruct patient to call for assistance with activity   - Consult OT/PT to assist with strengthening/mobility   - Keep Call bell within reach  - Keep bed low and locked with side rails adjusted as appropriate  - Keep care items and personal belongings within reach  - Initiate and maintain comfort rounds  - Make Fall Risk Sign visible to staff  - Offer Toileting every 2 Hours, in advance of need  - Initiate/Maintain bed  chair alarm  - Obtain necessary fall risk management equipment: nonskid socks  - Apply yellow socks and bracelet for high fall risk patients  - Consider moving patient to room near nurses station  Outcome: Progressing     Problem: PAIN - ADULT  Goal: Verbalizes/displays adequate comfort level or baseline comfort level  Description: Interventions:  - Encourage patient to monitor pain and request assistance  - Assess pain using appropriate pain scale  - Administer analgesics based on type and severity of pain and evaluate response  - Implement non-pharmacological measures as appropriate and evaluate response  - Consider cultural and social influences on pain and pain management  - Notify physician/advanced practitioner if interventions unsuccessful or patient reports new pain  Outcome: Progressing     Problem: INFECTION - ADULT  Goal: Absence or prevention of progression during hospitalization  Description: INTERVENTIONS:  - Assess and monitor for signs and symptoms of infection  - Monitor lab/diagnostic results  - Monitor all insertion sites, i e  indwelling lines, tubes, and drains  - Monitor endotracheal if appropriate and nasal secretions for changes in amount and color  - [place] appropriate cooling/warming therapies per order  - Administer medications as ordered  - Instruct and encourage patient and family to use good hand hygiene technique  - Identify and instruct in appropriate isolation precautions for identified infection/condition  Outcome: Progressing     Problem: SAFETY ADULT  Goal: Patient will remain free of falls  Description: INTERVENTIONS:  - Educate patient/family on patient safety including physical limitations  - Instruct patient to call for assistance with activity   - Consult OT/PT to assist with strengthening/mobility   - Keep Call bell within reach  - Keep bed low and locked with side rails adjusted as appropriate  - Keep care items and personal belongings within reach  - Initiate and maintain comfort rounds  - Make Fall Risk Sign visible to staff  - Offer Toileting every 2 Hours, in advance of need  - Initiate/Maintain bed  chair alarm  - Obtain necessary fall risk management equipment: nonskid socks  - Apply yellow socks and bracelet for high fall risk patients  - Consider moving patient to room near nurses station  Outcome: Progressing  Goal: Maintain or return to baseline ADL function  Description: INTERVENTIONS:  -  Assess patient's ability to carry out ADLs; assess patient's baseline for ADL function and identify physical deficits which impact ability to perform ADLs (bathing, care of mouth/teeth, toileting, grooming, dressing, etc )  - Assess/evaluate cause of self-care deficits   - Assess range of motion  - Assess patient's mobility; develop plan if impaired  - Assess patient's need for assistive devices and provide as appropriate  - Encourage maximum independence but intervene and supervise when necessary  - Involve family in performance of ADLs  - Assess for home care needs following discharge   - Consider OT consult to assist with ADL evaluation and planning for discharge  - Provide patient education as appropriate  Outcome: Progressing  Goal: Maintains/Returns to pre admission functional level  Description: INTERVENTIONS:  - Perform BMAT or MOVE assessment daily    - Set and communicate daily mobility goal to care team and patient/family/caregiver  - Collaborate with rehabilitation services on mobility goals if consulted  - Perform Range of Motion 3 times a day  - Reposition patient every 2 hours    - Dangle patient 3 times a day  - Stand patient 3 times a day  - Ambulate patient 3 times a day  - Out of bed to chair 3 times a day   - Out of bed for meals 3 times a day  - Out of bed for toileting  - Record patient progress and toleration of activity level   Outcome: Progressing     Problem: DISCHARGE PLANNING  Goal: Discharge to home or other facility with appropriate resources  Description: INTERVENTIONS:  - Identify barriers to discharge w/patient and caregiver  - Arrange for needed discharge resources and transportation as appropriate  - Identify discharge learning needs (meds, wound care, etc )  - Arrange for interpretive services to assist at discharge as needed  - Refer to Case Management Department for coordinating discharge planning if the patient needs post-hospital services based on physician/advanced practitioner order or complex needs related to functional status, cognitive ability, or social support system  Outcome: Progressing     Problem: Nutrition/Hydration-ADULT  Goal: Nutrient/Hydration intake appropriate for improving, restoring or maintaining nutritional needs  Description: Monitor and assess patient's nutrition/hydration status for malnutrition  Collaborate with interdisciplinary team and initiate plan and interventions as ordered  Monitor patient's weight and dietary intake as ordered or per policy  Utilize nutrition screening tool and intervene as necessary  Determine patient's food preferences and provide high-protein, high-caloric foods as appropriate       INTERVENTIONS:  - Monitor oral intake, urinary output, labs, and treatment plans  - Assess nutrition and hydration status and recommend course of action  - Evaluate amount of meals eaten  - Assist patient with eating if necessary   - Allow adequate time for meals  - Recommend/ encourage appropriate diets, oral nutritional supplements, and vitamin/mineral supplements  - Order, calculate, and assess calorie counts as needed  - Recommend, monitor, and adjust tube feedings and TPN/PPN based on assessed needs  - Assess need for intravenous fluids  - Provide specific nutrition/hydration education as appropriate  - Include patient/family/caregiver in decisions related to nutrition  Outcome: Progressing     Problem: MOBILITY - ADULT  Goal: Maintain or return to baseline ADL function  Description: INTERVENTIONS:  -  Assess patient's ability to carry out ADLs; assess patient's baseline for ADL function and identify physical deficits which impact ability to perform ADLs (bathing, care of mouth/teeth, toileting, grooming, dressing, etc )  - Assess/evaluate cause of self-care deficits   - Assess range of motion  - Assess patient's mobility; develop plan if impaired  - Assess patient's need for assistive devices and provide as appropriate  - Encourage maximum independence but intervene and supervise when necessary  - Involve family in performance of ADLs  - Assess for home care needs following discharge   - Consider OT consult to assist with ADL evaluation and planning for discharge  - Provide patient education as appropriate  Outcome: Progressing  Goal: Maintains/Returns to pre admission functional level  Description: INTERVENTIONS:  - Perform BMAT or MOVE assessment daily    - Set and communicate daily mobility goal to care team and patient/family/caregiver  - Collaborate with rehabilitation services on mobility goals if consulted  - Perform Range of Motion 3 times a day  - Reposition patient every 2 hours    - Dangle patient 3 times a day  - Stand patient 3 times a day  - Ambulate patient 3 times a day  - Out of bed to chair 3 times a day   - Out of bed for meals 3 times a day  - Out of bed for toileting  - Record patient progress and toleration of activity level   Outcome: Progressing

## 2023-01-05 LAB
GLUCOSE SERPL-MCNC: 134 MG/DL (ref 65–140)
GLUCOSE SERPL-MCNC: 149 MG/DL (ref 65–140)
GLUCOSE SERPL-MCNC: 160 MG/DL (ref 65–140)
GLUCOSE SERPL-MCNC: 229 MG/DL (ref 65–140)

## 2023-01-05 RX ADMIN — COLLAGENASE SANTYL 1 APPLICATION: 250 OINTMENT TOPICAL at 08:25

## 2023-01-05 RX ADMIN — HEPARIN SODIUM 5000 UNITS: 5000 INJECTION INTRAVENOUS; SUBCUTANEOUS at 07:00

## 2023-01-05 RX ADMIN — AMLODIPINE BESYLATE 5 MG: 5 TABLET ORAL at 17:41

## 2023-01-05 RX ADMIN — INSULIN GLARGINE 10 UNITS: 100 INJECTION, SOLUTION SUBCUTANEOUS at 21:43

## 2023-01-05 RX ADMIN — INSULIN LISPRO 1 UNITS: 100 INJECTION, SOLUTION INTRAVENOUS; SUBCUTANEOUS at 17:43

## 2023-01-05 RX ADMIN — AMLODIPINE BESYLATE 5 MG: 5 TABLET ORAL at 08:27

## 2023-01-05 RX ADMIN — HEPARIN SODIUM 5000 UNITS: 5000 INJECTION INTRAVENOUS; SUBCUTANEOUS at 14:40

## 2023-01-05 RX ADMIN — PANTOPRAZOLE SODIUM 40 MG: 40 TABLET, DELAYED RELEASE ORAL at 07:00

## 2023-01-05 RX ADMIN — INSULIN LISPRO 2 UNITS: 100 INJECTION, SOLUTION INTRAVENOUS; SUBCUTANEOUS at 11:42

## 2023-01-05 RX ADMIN — MELATONIN TAB 3 MG 3 MG: 3 TAB at 19:36

## 2023-01-05 RX ADMIN — HEPARIN SODIUM 5000 UNITS: 5000 INJECTION INTRAVENOUS; SUBCUTANEOUS at 21:45

## 2023-01-05 RX ADMIN — SIMETHICONE 80 MG: 80 TABLET, CHEWABLE ORAL at 17:41

## 2023-01-05 RX ADMIN — Medication 1 TABLET: at 08:24

## 2023-01-05 RX ADMIN — CHOLECALCIFEROL TAB 10 MCG (400 UNIT) 400 UNITS: 10 TAB at 08:25

## 2023-01-05 RX ADMIN — ACETAMINOPHEN 975 MG: 325 TABLET, FILM COATED ORAL at 07:00

## 2023-01-05 RX ADMIN — INSULIN LISPRO 6 UNITS: 100 INJECTION, SOLUTION INTRAVENOUS; SUBCUTANEOUS at 07:05

## 2023-01-05 RX ADMIN — ACETAMINOPHEN 975 MG: 325 TABLET, FILM COATED ORAL at 21:46

## 2023-01-05 RX ADMIN — ATORVASTATIN CALCIUM 40 MG: 40 TABLET, FILM COATED ORAL at 08:24

## 2023-01-05 RX ADMIN — PANTOPRAZOLE SODIUM 40 MG: 40 TABLET, DELAYED RELEASE ORAL at 17:41

## 2023-01-05 RX ADMIN — SIMETHICONE 80 MG: 80 TABLET, CHEWABLE ORAL at 21:45

## 2023-01-05 RX ADMIN — ACETAMINOPHEN 975 MG: 325 TABLET, FILM COATED ORAL at 14:40

## 2023-01-05 RX ADMIN — OXYCODONE HYDROCHLORIDE 10 MG: 10 TABLET ORAL at 21:44

## 2023-01-05 RX ADMIN — ERTAPENEM SODIUM 1000 MG: 1 INJECTION, POWDER, LYOPHILIZED, FOR SOLUTION INTRAMUSCULAR; INTRAVENOUS at 21:42

## 2023-01-05 RX ADMIN — ASPIRIN 81 MG CHEWABLE TABLET 81 MG: 81 TABLET CHEWABLE at 08:24

## 2023-01-05 RX ADMIN — METHOCARBAMOL 500 MG: 500 TABLET ORAL at 17:41

## 2023-01-05 RX ADMIN — SIMETHICONE 80 MG: 80 TABLET, CHEWABLE ORAL at 08:24

## 2023-01-05 RX ADMIN — METHOCARBAMOL 500 MG: 500 TABLET ORAL at 08:24

## 2023-01-05 RX ADMIN — TAMSULOSIN HYDROCHLORIDE 0.4 MG: 0.4 CAPSULE ORAL at 17:41

## 2023-01-05 RX ADMIN — SIMETHICONE 80 MG: 80 TABLET, CHEWABLE ORAL at 11:39

## 2023-01-05 RX ADMIN — ONDANSETRON 4 MG: 4 TABLET, ORALLY DISINTEGRATING ORAL at 19:36

## 2023-01-05 RX ADMIN — INSULIN LISPRO 6 UNITS: 100 INJECTION, SOLUTION INTRAVENOUS; SUBCUTANEOUS at 17:43

## 2023-01-05 RX ADMIN — ONDANSETRON 4 MG: 4 TABLET, ORALLY DISINTEGRATING ORAL at 07:00

## 2023-01-05 NOTE — CASE MANAGEMENT
dme order placed with [facility] via [employer] as they are the par provider for pts insurance, [place]  Lightweight 18x18 wheelchair and semi electric hospital bed withf 4 half rails ordered  Awaiting confirmation of the order

## 2023-01-05 NOTE — PROGRESS NOTES
PM&R PROGRESS NOTE:  Luis Po 61 y o  male MRN: [id number]  Unit/Bed#: -30 Encounter: [id number]        Rehabilitation Diagnosis: Impairment of mobility, safety and Activities of Daily Living (ADLs) due to Neurologic Conditions:  03 8  Neuromuscular Disorders    HPI: Clinton Thomas is a 61 y o  male with a medical history of HTN, HLD, GERD, and ventral hernia that presented to [facility] on 11/7 for an elective surgical procedure d/t metastatic colon adenocarcinoma by Dr Melida Gomez  Patient presented to hem/onc 2/22 after CT abdomen showed transverse colonic apple core lesion and innumerable hepatic metastases  MRI revealed multiple hepatic metastasis with smaller lesions in left lobe and larger lesions in right lobe  Patient completed chemotherapy, colostomy at that time  On 11/7 patient underwent exploratory laparotomy, segment 3 liver resection, ablation, intraoperative ultrasound, and colostomy reversal  This was c/b left upper quadrant hematoma, imaging showed suspected leak from transverse colon anastomosis  On 11/16, patient underwent exp lap which revealed LUQ infected hematoma, defect in transverse colon anastomosis with extravasation of succus  Massively dilated cecum requiring resection  He had a right hemicolectomy, left in discontinuity and temporary abdominal closure  On 11/17, re-exploration, partial descending colectomy, primary colonic anastomosis created with diverting loop ileostomy and midline wound VAC placement  He completed course of IV abx for ESBL bacteremia from abdominal abscess  Midline abdominal incision wet to dry dressings and packing to old stoma site  12/6 abdominal incision noted to have yellow drainage  CT abdomen showed abdominal abscess and distended gallbladder  Patient underwent percutaneous cholecystostomy tube insertion and hepatectomy abscess drainage  ID consulted, IV abx for 1 week  Nephrology consulted d/t ALEXY, creatinine baseline 1 2-1 4   On 12/14 patient experienced chest pain, appeared musculoskeletal with no further work-up  Patient deemed medically stable and admitted to [facility] on 12/14/2022  SUBJECTIVE: Patient seen face to face  Reports feeling nauseous with decreased appetite  Nausea has improved since doxycycline stopped, but not resolved  Patient is taking prn zofran and scheduled simethicone with little improvement  Patient to trialing different food options with therapy to see if he can tolerate without nausea  Pain continuing to improve with current regimen  VICTORINO tube #3 had sanguineous drainage 01/04, awaiting IR evaluation  No CP, SOB, fever, chills, V    ASSESSMENT: Stable, progressing    PLAN:  1  Nausea; improving but continues with decreased appetite  2  IR evaluated tube #3: per IR flushing appropriately and the serosanguineous fluid can occur after a tube check  Continue daily flushes on all the drains and monitor output  Reach out to IR if drainage is <10 cc per day for 2 days  3  CBC/BMP Friday am lab draw  Rehabilitation    • Functional deficits:  Decreased foot clearance, shuffle gait  • Continue current rehabilitation plan of care to maximize function      • Functional update:   PT: mobility- supervision, transfers- incidental touching, ambulation- incidental touching, RW 30'  OT: ADL- bathing/dressing: UB supervision, LB incidental touching, toileting- incidental touching    · Estimated Discharge: ADD 01/09 with home PT, OT, RN    Pain  • Tylenol, oxycodone    DVT prophylaxis  • heparin    Bladder plan  • Continent    Bowel plan  • Ileostomy     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 liver [facility])  Assessment & Plan  · MRI-multiple hepatic metastasis; smaller lesions in left lobe, larger lesions in right lobe  · 11/7 Exp Lap, resection of hepatic segment 3, resection of transverse colon with reversal of loop colostomy (Dr Underwood November)  · 11/16- right hemicolectomy, left discontinuity and temporary abdominal closure device placed  · 11/17- re-exploration, partial descending colectomy, primary colocolonic anastomosis with diverting loop ileostomy, midline VAC placement  · CT showed abdominal abscess and distended gallbladder  · 12/8- IR percutaneous cholecystostomy tube, hepatectomy abscess drain placement  ·  IR on 12/20 additional drains placed for a perisplenic abscess as well as a splenic recess abscess  · Total of 3 drains in place  * Malignant neoplasm of transverse colon [facility])  Assessment & Plan  · Transverse colonic apple core lesion and innumerable hepatic metastases  · S/p Colostomy placement 2/22  · RCW port-a-cath, accessed  · S/p palliative chemo  · Follows with Dr Daniel Nunez (palliative)  · Follows hem/onc (Dr Lonzell Romberg)    Elevated alkaline phosphatase level  Assessment & Plan  1315, (previous 1066)  Hepatic mets  Follow level  ?bone involvement - no evidence of ROM limitations or pain complaints on survey  Follow up with Surg Onc    Dehiscence of incision  Assessment & Plan  Improving  Wound Care following and surgical oncology following   [place] present  [place] ordered 12/29/22  Surgical oncology- no signs of infection, recommend Mes[place], ABD, paper tape     [address] as follows:   Skin Care Plan:  1-Midline Incision and Right Abdomen Wounds: Cleanse wounds with NS and pat dry  Apply Santyl to slough tissue on wound bed nickel thick  Cover with ABD and secure with minimal tape  Change daily or PRN soilage or displacement  2-Turn/reposition q2h or when medically stable for pressure re-distribution on skin   3-Elevate heels to offload pressure  4-Moisturize skin daily with skin nourishing cream  5-Ehob cushion in chair when out of bed  6-Preventative Hydraguard to bilateral heels and bilateral sacro-buttocks BID and PRN          Leukocytosis  Assessment & Plan  WBC 12 67 (previous 10 15)    Acute pain  Assessment & Plan  Managed on Scheduled Tylenol 975 mg [id number]  Managed on Robaxin 500 mg BId  Oxycodone IR PRN  Moderately managed     Abscess  Assessment & Plan  · Abdominal abscess- ESBL E Coli - Contact precautions  · Zosyn completed  · Completed Ertapenem course 12/28/22  · Starting Doxycycline 100 mg Q12h 12/30/22 per ID consultants    · Doxycycline stopped, Ertapenem restarted complete 01/07    · 3 drains placed by IR  · Monitor 3 drain output:  · Abscess drain abdomen A: 0 cc   · Abscess drain back: 0-10 cc  · Abscess drain abdomen B: 15 - 20cc    1/3- tube check w/ IR  Tube position check  Patient having some discomfort in left abdomen while supine - per IR tubes in place, bulbs changed, recheck tube placement in 2 weeks  Sepsis ([facility] [address] )  Assessment & Plan  · SIRS versus sepsis at this time given his vitals, leukocytosis, and complicated infection history  · Mgmt per ID, IM  · 12/20-IR perisplenic, perigastric drain placement for 2 additional abscesses-  · Perigastric abscess +E  Coli ESBL  · Blood cultures negative     Acute on chronic kidney failure [facility])  Assessment & Plan  · Creatinine baseline 1 2-1 4  · Cr = 1 3  · Monitor BMP    Bacteremia  Assessment & Plan  · Remains on Abx     Iron deficiency anemia, unspecified  Assessment & Plan  · Hbg = 8 1  · 12/15-1 unit PRBc  · 12/16- symptomatic- 1 unit PRBc  · 12/28- 1 unit PRBc  · Monitor CBC    Obesity (BMI 30-39  9)  Assessment & Plan  · BMI 33 0    Benign essential hypertension  Assessment & Plan  · Home: Norvasc 5 mg BID  · Here: Norvasc 5 mg BID  · Adjust medication as needed    Type 2 diabetes mellitus without complication, with long-term current use of insulin ([facility])  Assessment & Plan  · HbgA1c 8 0 (9/22)  · Home monitoring with Nilesh  · Home: Lantus 12 units, Humalog 3 units with meals, Januvia  · Here: Lantus 6 units,  Humalog 6 units with meals, SSI, Lantus   · Follow with PCP (Dr Virginie Baker)      [address] consultants medical co-management  Labs, medications, and imaging reviewed       ROS:  Review of Systems   Constitutional: Positive for appetite change  Negative for chills and fatigue  HENT: Negative for congestion and sore throat  Respiratory: Negative for cough and shortness of breath  Cardiovascular: Negative for chest pain and palpitations  Gastrointestinal: Positive for nausea  Negative for vomiting  Genitourinary: Negative for difficulty urinating and dysuria  Musculoskeletal: Positive for back pain  Negative for arthralgias  Neurological: Negative for dizziness and headaches  OBJECTIVE:   /87 (BP Location: Right arm)   Pulse 91   Temp 98 °F (36 7 °C) (Oral)   Resp 16   Ht 5' 10" (1 778 m)   Wt 104 kg (230 lb)   SpO2 98%   BMI 33 00 kg/m²     Physical Exam  Constitutional:       Appearance: He is obese  HENT:      Head: Normocephalic and atraumatic  Mouth/Throat:      Mouth: Mucous membranes are moist    Cardiovascular:      Rate and Rhythm: Normal rate and regular rhythm  Pulses: Normal pulses  Heart sounds: Normal heart sounds  Pulmonary:      Effort: Pulmonary effort is normal       Breath sounds: Normal breath sounds  Abdominal:      Palpations: Abdomen is soft  Tenderness: There is abdominal tenderness  Comments: + ileostomy, 3 VICTORINO drains   Skin:     General: Skin is warm and dry  Neurological:      Mental Status: He is alert and oriented to person, place, and time     Psychiatric:         Mood and Affect: Mood normal         Lab Results   Component Value Date    WBC 12 67 (H) 01/02/2023    HGB 8 1 (L) 01/02/2023    HCT 26 9 (L) 01/02/2023    MCV 86 01/02/2023     (H) 01/02/2023     Lab Results   Component Value Date    SODIUM 136 01/03/2023    K 4 1 01/03/2023     01/03/2023    CO2 19 (L) 01/03/2023    BUN 20 01/03/2023    CREATININE 1 30 01/03/2023    GLUC 139 01/03/2023    CALCIUM 8 5 01/03/2023     Lab Results   Component Value Date    INR 1 12 12/19/2022    INR 1 10 11/12/2022    INR 1 24 (H) 11/09/2022    PROTIME 14 7 (H) 12/19/2022    PROTIME 14 4 11/12/2022 PROTIME 15 8 (H) 11/09/2022       Current Facility-Administered Medications:   •  acetaminophen (TYLENOL) tablet 975 mg, 975 mg, Oral, Q8H [address], GAURANG Keith, 975 mg at 01/05/23 0700  •  amLODIPine (NORVASC) tablet 5 mg, 5 mg, Oral, BID, GAURANG Keith, 5 mg at 01/05/23 0827  •  aspirin chewable tablet 81 mg, 81 mg, Oral, Daily, GAURANG Keith, 81 mg at 01/05/23 2797  •  atorvastatin (LIPITOR) tablet 40 mg, 40 mg, Oral, Daily, GAURANG Keith, 40 mg at 01/05/23 5899  •  calcium carbonate (TUMS) chewable tablet 500 mg, 500 mg, Oral, BID PRN, GAURANG Keith, 500 mg at 01/03/23 1406  •  cholecalciferol (VITAMIN D3) tablet 400 Units, 400 Units, Oral, Daily, GAURANG Keith, 400 Units at 01/05/23 0825  •  collagenase (SANTYL) ointment, , Topical, Daily, Mateo Hawkins MD, 1 application at 23/19/92 0825  •  ertapenem (INVanz) 1,000 mg in sodium chloride 0 9 % 50 mL IVPB, 1,000 mg, Intravenous, Q24H, Ivett Delgadillo MD, Stopped at 01/04/23 2138  •  heparin (porcine) subcutaneous injection 5,000 Units, 5,000 Units, Subcutaneous, Q8H [address], GAURANG Keith, 5,000 Units at 01/05/23 0700  •  insulin glargine (LANTUS) subcutaneous injection 10 Units 0 1 mL, 10 Units, Subcutaneous, HS, GAURANG Zurita, 10 Units at 01/04/23 2150  •  insulin lispro (HumaLOG) 100 units/mL subcutaneous injection 1-5 Units, 1-5 Units, Subcutaneous, HS, GAURANG Keith, 1 Units at 01/04/23 2149  •  insulin lispro (HumaLOG) 100 units/mL subcutaneous injection 1-6 Units, 1-6 Units, Subcutaneous, TID AC, 2 Units at 01/05/23 1142 **AND** Fingerstick Glucose (POCT), , , TID AC, GAURANG Keith  •  insulin lispro (HumaLOG) 100 units/mL subcutaneous injection 6 Units, 6 Units, Subcutaneous, TID With Meals, GAURANG George, 6 Units at 01/05/23 0705  •  iohexol (OMNIPAQUE) 350 MG/ML injection (SINGLE-DOSE) 50 mL, 50 mL, Intravenous, Once in imaging, Joey Borden MD  •  melatonin tablet 3 mg, 3 mg, Oral, HS, GAURANG Keith, 3 mg at 01/04/23 2027  •  methocarbamol (ROBAXIN) tablet 500 mg, 500 mg, Oral, BID, GAURANG Keith, 500 mg at 01/05/23 0933  •  multivitamin-minerals (CENTRUM) tablet 1 tablet, 1 tablet, Oral, Daily, GAURANG Keith, 1 tablet at 01/05/23 4017  •  ondansetron (ZOFRAN-ODT) dispersible tablet 4 mg, 4 mg, Oral, Q6H PRN, GAURANG Keith, 4 mg at 01/03/23 2107  •  ondansetron (ZOFRAN-ODT) dispersible tablet 4 mg, 4 mg, Oral, BID, GAURANG Marcos, 4 mg at 01/05/23 0700  •  oxyCODONE (ROXICODONE) immediate release tablet 10 mg, 10 mg, Oral, Q6H PRN, GAURANG Keith, 10 mg at 01/02/23 2103  •  oxyCODONE (ROXICODONE) IR tablet 2 5 mg, 2 5 mg, Oral, Q3H PRN, GAURANG Keith, 2 5 mg at 12/25/22 1911  •  oxyCODONE (ROXICODONE) IR tablet 5 mg, 5 mg, Oral, Q6H PRN, GAURANG Keith, 5 mg at 01/03/23 2131  •  pantoprazole (PROTONIX) EC tablet 40 mg, 40 mg, Oral, BID AC, GAURANG Keiht, 40 mg at 01/05/23 0700  •  simethicone (MYLICON) chewable tablet 80 mg, 80 mg, Oral, 4x Daily (with meals and at bedtime), GAURANG Keith, 80 mg at 01/05/23 1139  •  tamsulosin (FLOMAX) capsule 0 4 mg, 0 4 mg, Oral, Daily With Dinner, Evelia Cheek, GAUARNG, 0 4 mg at 01/04/23 1715    Past Medical History:   Diagnosis Date   • Abdominal pain 03/12/2022   • Acute renal failure ([facility] [place] [address] )     [id number] resolved   • Cancer ([place] [address] )    • Diabetes mellitus ([place] [address] )    • Enteritis 08/23/2016   • Gastroparesis due to DM ([place] [address] ) 08/23/2016   • GERD (gastroesophageal reflux disease)    • Hernia, ventral 08/04/2016   • Hyperlipidemia    • Hypertension    • Morbid obesity ([place] [address] ) 04/17/2018   • Postoperative visit 03/02/2022   • SIRS (systemic inflammatory response syndrome) ([place] [address] ) 03/12/2022   • Snoring    • Stage 3a chronic kidney disease ([place] [address] ) 02/19/2022       Patient Active Problem List    Diagnosis Date Noted   • 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 liver ([place] [address] ) 03/02/2022   • Malignant neoplasm of transverse colon ([place] [address] ) 03/01/2022   • Dehiscence of incision 12/30/2022   • Elevated alkaline phosphatase level 12/30/2022   • Leukocytosis 12/29/2022   • Abscess 12/27/2022   • Acute pain 12/27/2022   • Sepsis ([place] [address] ) 12/17/2022   • Severe protein-calorie malnutrition ([place] [address] ) 12/14/2022   • Acute on chronic kidney failure ([place] [address] ) 12/14/2022   • Flash pulmonary edema ([place] [address] ) 11/12/2022   • MR (mitral regurgitation) 11/12/2022   • Bacteremia 11/12/2022   • ESBL (extended spectrum beta-lactamase) producing bacteria infection 11/12/2022   • Acute respiratory failure with hypoxia ([place] [address] ) 11/12/2022   • Encephalopathy 11/09/2022   • Cervical radiculopathy 10/26/2022   • Other fatigue 06/15/2022   • Colostomy prolapse ([place] [address] ) 05/27/2022   • Colon cancer metastasized to liver ([place] [address] ) 03/12/2022   • Iron deficiency anemia, unspecified 03/01/2022   • Thrombocytosis 02/21/2022   • Hypokalemia 02/19/2022   • Transaminitis 02/19/2022   • Type 2 diabetes mellitus with hyperlipidemia ([place] [address] ) 02/18/2022   • Colonic mass 02/18/2022   • Microcytic anemia 02/18/2022   • Left ureteral calculus 01/30/2020   • Incarcerated umbilical hernia 58/26/0529   • Testicular hypogonadism 06/19/2017   • Low testosterone 05/30/2017   • Type 2 diabetes mellitus without complication, with long-term current use of insulin ([place] [address] ) 08/23/2016   • Benign essential hypertension 08/23/2016   • Mixed hyperlipidemia 08/23/2016   • Erectile dysfunction 07/11/2016   • Obesity (BMI 30-39 9) 07/11/2016      GAURANG Fernández  Physical Medicine and Chris Luu    Total visit time: 25 minutes, with more than 50% spent counseling/coordinating care  Counseling includes discussion with patient re: progress in therapies, functional issues observed by therapy staff, and discussion with patient regarding their current medical state and wellbeing   Coordination of patient's care was performed in conjunction with Internal Medicine service to monitor patient's labs, vitals, and management of their comorbidities

## 2023-01-05 NOTE — QUICK NOTE
Primary team reached out regarding serosanguinous drainage from drain #3 starting yesterday, 1/4 after tube check on 1/3, the day prior  Bedside evaluation of the drain  Patient reports that all 3 were just flushed by the nurse with no pain or leakage from around the drain insertion site  Left-sided drain insertion sites appear in good condition with no swelling, erythema, leaking, purulent drainage at insertion site  Drain#3 noted to have small volume of serosanguinous fluid  Flushes easily with no pain or leakage noted  -Please continue daily flushes of all drains as ordered  -Reach out to IR if drain output is <10cc per day for 2 days   Otherwise, can plan for repeat tube check in 2 weeks from last tube check 1/3/2022  -Please reach out to IR with any further questions/concerns

## 2023-01-05 NOTE — PROGRESS NOTES
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  • s/p hepatic resection and reversal of colostomy on 11/7  • s/p right hemicolectomy with partial descending colectomy colocolonic anastomosis with loop ileostomy and mid line abdominal VAC placement 11/16  • Continue local wound care; WC following  • CT  Abd/pelvis 12/17 = loculated collection along splenic recess   Has perisplenic hilum collection as well --> to IR 12/20 for drain placement in both perigastric and perisplenic area and cx sent from both areas = Ecoli  • Flush drains with 10ml qd; drained 10/10/10 on 1/4  • S-O following/Dr Peyton Eli was in to see pt 1/2/23  • Tube check (incl perc alvina tube) done 1/3 = unchanged with still some fluid around each drain so kept in  Next tube check 2 weeks @1/17  • Bloody drainage in VICTORINO #3 and IR to see pt     Acute cholecystitis  • s/p percutaneous tube placement 12/8 with tube check on 12/12, 12/14  • GI saw due to alk phos elevation = felt 2/2 infiltrative disease rather than focal biliary obstruction   AMA was negative     • Alkaline phosphatase isoenzyme sent 12/17 (48% liver/52% bone) = GI  recommended continuing to monitor his LFTs and defer to surgical oncology for further w/u and plan   Last 3 APs were [phone]  Lata Ruiz recheck 1/3    • TB improving = to consider MRCP if total bili trends back up but was normal 12/30  • Alvina tube = 20 ml 1/3/23     • Drainage still has dark blood in it but seems to slowly becoming less  • Perc alvina tube check done 1/3     • Per GI, do cholangiogram with next tube check --> wasn't done 1/3 and GI aware  • GI wrote note 1/4 = aware labs incl GGT of 1992 (previously 7365 on 12/17)     Bacteremia/abdominal abscess  • ID following   • s/p Ertapenem was switched to Doxycycline per ID as of 12/30  • Blood cultures negative  • CT abdomen and pelvis as above  • + nausea since starting Doxy on 12/30   Lasts 1-2 hrs and was affecting appetite pt said   • Changed back to Ertapenem 1/3/23 since was having nausea with the Doxy     Hypertension  • On Norvasc 5mg BID  • Usually gets doses but held AM 1/4 for SBP 1/4     Diabetes mellitus  • On Lantus 10U qhs/Lispro 6U TID  • Continue DM diet and QID Accuchecks/SSI  • Eating/meal completion is erratic and therefore BSs difficult  • May have to consider stopping Lispro WM and increasing coverage scale instead if no improvement but no changes for today     Acute on chronic renal failure  • Stage III; baseline 1 2-1 4  • Lisinopril currently on hold  • s/p NSS IVF with improvement of creat =  back down to 1 2 12/30  • Stable at 1 3   • CMP 1/6     Anemia  • Multifactorial due to recent infection, surgery, CKD stage III  • Transfused on 12/15, 12/16/22 and 12/30  • Hemoglobin 1/2/23 stable at 8 1     Atypical chest pain  • With elevation in troponin/EKG changes on acute side  • Cardiology saw then and cleared pt for discharge  • Did not recommend any further work up  • No further chest pain     Situational depression  • Neuropsychology consulted  • Patient not interested in medications at this point in time     Malnutrition  • Glucerna makes him nauseated  • On 12/15, ordered double protein     Pleural effusion  • CXR on 12/14 showed small left pleural effusion and was suspect for CHF  • ECHO 11/9/22 = LVEF 55%, unable to assess diastolic function, mild TR     L sided pleuritic pain  • located near drain site and per patient only occurs at night  • No evidence of infection        Discharge date:  1/9/23       The above assessment and plan was reviewed and updated as determined by my evaluation of the patient on 1/5/2023      Labs:   Results from last 7 days   Lab Units 01/02/23  0602 12/31/22  0458   WBC Thousand/uL 12 67* 10 91*   HEMOGLOBIN g/dL 8 1* 8 0*   HEMATOCRIT % 26 9* 27 5*   PLATELETS Thousands/uL 620* 600*     Results from last 7 days   Lab Units 01/03/23  0635 12/30/22  0639   SODIUM mmol/L 136 139   POTASSIUM mmol/L 4 1 4 1   CHLORIDE mmol/L 108 112*   CO2 mmol/L 19* 21   BUN mg/dL 20 20   CREATININE mg/dL 1 30 1 23   CALCIUM mg/dL 8 5 9 0             Results from last 7 days   Lab Units 01/05/23  1055 01/05/23  0652 01/04/23  2051   POC GLUCOSE mg/dl 229* 134 174*       Review of Scheduled Meds:  Current Facility-Administered Medications   Medication Dose Route Frequency Provider Last Rate   • acetaminophen  975 mg Oral Q8H [facility] GAURANG Keith     • amLODIPine  5 mg Oral BID GAURANG Keith     • aspirin  81 mg Oral Daily GAURANG Keith     • atorvastatin  40 mg Oral Daily GAURANG Keith     • calcium carbonate  500 mg Oral BID PRN GAURANG Robles     • cholecalciferol  400 Units Oral Daily GAURANG Keith     • collagenase   Topical Daily Ady Browne MD     • ertapenem  1,000 mg Intravenous Q24H Hilary Molina MD Stopped (01/04/23 2138)   • heparin (porcine)  5,000 Units Subcutaneous Q8H [facility] GAURANG Keith     • insulin glargine  10 Units Subcutaneous HS GAURANG Ritter     • insulin lispro  1-5 Units Subcutaneous HS GAURANG Keith     • insulin lispro  1-6 Units Subcutaneous TID AC GAURANG Keith     • insulin lispro  6 Units Subcutaneous TID With Meals GAURANG Ritter     • iohexol  50 mL Intravenous Once in imaging Navin Jauregui MD     • melatonin  3 mg Oral HS GAURANG Keith     • methocarbamol  500 mg Oral BID GAURANG Robles     • multivitamin-minerals  1 tablet Oral Daily GAURANG Keith     • ondansetron  4 mg Oral Q6H PRN GAURANG Keith     • ondansetron  4 mg Oral BID GAURANG Deras     • oxyCODONE  10 mg Oral Q6H PRN GAURANG Keith     • oxyCODONE  2 5 mg Oral Q3H PRN [place] Later, CRNP     • oxyCODONE  5 mg Oral Q6H PRN GAURANG Keith     • pantoprazole  40 mg Oral BID AC GAURANG Keith     • simethicone  80 mg Oral 4x Daily (with meals and at bedtime) [place] Later, GAURANG     • tamsulosin  0 4 mg Oral Daily With Dinner [place] Later, GAURANG         Subjective/ HPI: Patient seen and examined  Patients overnight issues or events were reviewed with nursing or staff during rounds or morning huddle session  New or overnight issues include the following:     No new or overnight issues  Offers no complaints    ROS:   A 10 point ROS was performed; negative except as noted above  Imaging:     IR drainage tube check and/or removal   Final Result by Sal Fuchs MD (01/04 2781)   Impression:   1  Drain check demonstrated all 3 drains to be patent  No fistula to the bowel  There are still small collections around each drain and therefore the drains were left in place  PLAN: Tubes to bag drainage  Return in 2 weeks for tube checks  Workstation performed: [id number]         IR drainage tube placement   Final Result by Alana Sewell MD (12/20 2003)      Percutaneous placement of abscess drainage catheters into the perigastric and perisplenic abscesses using 10-Spanish catheters  Plan:       - Catheters to bulb suction drainage    - Flush catheters with 10 mL normal saline daily    - Return in 2 weeks for drain check  Workstation performed: [id number]         CT abdomen pelvis w contrast   Final Result by Aide Choi MD (43/16 [id number])      Status post cholecystostomy tube placement with decompression of the gallbladder  In addition, a catheter placed adjacent to the cut edge of the liver is increased position with near complete resolution of fluid collection  Loculated collection along the splenic recess of the lesser sac is unchanged in size    Additional perisplenic collection in the hilum and along the inferior pole are unchanged  Collection of fluid and gas in the left upper quadrant adjacent to left kidney also not significantly changed  No extravasated oral contrast             Workstation performed: [id number]         IR drainage tube check/change/reposition/reinsertion/upsize    (Results Pending)   IR cholecystostomy tube check and/or removal    (Results Pending)       *Labs /Radiology studies reviewed  *Medications reviewed and reconciled as needed  *Please refer to order section for additional ordered labs studies  *Case discussed with primary attending during morning huddle case rounds    Physical Examination:  Vitals:   Vitals:    01/04/23 1410 01/04/23 2106 01/05/23 0700 01/05/23 0827   BP: 142/83 144/82 147/85 136/82   BP Location: Right arm Right arm Left leg    Pulse: 100 98 86    Resp: 18 18 18    Temp: 98 5 °F (36 9 °C) 99 2 °F (37 3 °C) 98 6 °F (37 °C)    TempSrc: Oral Oral Oral    SpO2: 98% 99% 97%    Weight:       Height:           General Appearance: no distress, conversive  HEENT: PERRLA, conjuctiva normal; oropharynx clear; mucous membranes moist   Neck:  Supple, normal ROM  Lungs: CTA, normal respiratory effort, no retractions, expiratory effort normal  CV: regular rate and rhythm; no rubs/murmurs/gallops, PMI normal   ABD: soft; ND/NT; +BS  EXT: no edema  Skin: normal turgor, normal texture, no rashes  Psych: affect normal, mood normal  Neuro: AAO      The above physical exam was reviewed and updated as determined by my evaluation of the patient on 1/5/2023      Invasive Devices     Central Venous Catheter Line  Duration           Port A Cath 03/10/22 Right Chest 301 days          Drain  Duration           Ileostomy LUQ 48 days    Abscess Drain Abdomen 27 days    Cholecystostomy Tube 23 days    Abscess Drain Abdomen 15 days    Abscess Drain Back 15 days                   VTE Pharmacologic Prophylaxis: Heparin  Code Status: Level 1 - Full Code  Current Length of Stay: 22 day(s)      Total time spent:  30 minutes with more than 50% spent counseling/coordinating care  Counseling includes discussion with patient re: progress  and discussion with patient of his/her current medical state/information  Coordination of patient's care was performed in conjunction with primary service  Time invested included review of patient's labs, vitals, and management of their comorbidities with continued monitoring  In addition, this patient was discussed with medical team including physician and advanced extenders  The care of the patient was extensively discussed and appropriate treatment plan was formulated unique for this patient  Medical decision making for the day was made by supervising physician unless otherwise noted in their attestation statement  ** Please Note:  voice to text software may have been used in the creation of this document   Although proof errors in transcription or interpretation are a potential of such software**

## 2023-01-05 NOTE — PROGRESS NOTES
01/05/23 0930   Pain Assessment   Pain Assessment Tool FLACC   Pain Rating: FLACC (Rest) - Face 0   Pain Rating: FLACC (Rest) - Legs 0   Pain Rating: FLACC (Rest) - Activity 0   Pain Rating: FLACC (Rest) - Cry 0   Pain Rating: FLACC (Rest) - Consolability 0   Score: FLACC (Rest) 0   Pain Rating: FLACC (Activity) - Face 1   Pain Rating: FLACC (Activity) - Legs 0   Pain Rating: FLACC (Activity) - Activity 0   Pain Rating: FLACC (Activity) - Cry 0   Pain Rating: FLACC (Activity) - Consolability 0   Score: FLACC (Activity) 1   Restrictions/Precautions   Precautions Fall Risk;Contact/isolation;Multiple lines;Supervision on toilet/commode  (Dillan drain, ileostomy, IR Drain)   Weight Bearing Restrictions No   ROM Restrictions No   Cognition   Overall Cognitive Status WFL   Subjective   Subjective "I just don't feel good  I have such low enegry cause I'm not able to take in enough calories"   Sit to Lying   Type of Assistance Needed Supervision; Adaptive equipment   Physical Assistance Level No physical assistance   Sit to Lying CARE Score 4   Lying to Sitting on Side of Bed   Type of Assistance Needed Supervision; Adaptive equipment   Physical Assistance Level No physical assistance   Lying to Sitting on Side of Bed CARE Score 4   Sit to Stand   Type of Assistance Needed Supervision; Adaptive equipment   Physical Assistance Level No physical assistance   Comment CS with RW   Sit to Stand CARE Score 4   Bed-Chair Transfer   Type of Assistance Needed Supervision; Adaptive equipment   Physical Assistance Level No physical assistance   Comment CS with RW   Chair/Bed-to-Chair Transfer CARE Score 4   Transfer Bed/Chair/Wheelchair   Limitations Noted In Endurance   Walk 10 Feet   Type of Assistance Needed Supervision; Adaptive equipment   Physical Assistance Level No physical assistance   Walk 10 Feet CARE Score 4   Walk 150 Feet   Reason if not Attempted Safety concerns   Walk 150 Feet CARE Score 88   Walking 10 Feet on Uneven Surfaces   Reason if not Attempted Safety concerns   Walking 10 Feet on Uneven Surfaces CARE Score 88   Ambulation   Primary Mode of Locomotion Prior to Admission Walk   Distance Walked (feet) 20 ft   Assist Device Roller Walker   Gait Pattern Slow Josefina;Decreased foot clearance   Limitations Noted In Heel Strike; Sequencing;Speed;Strength;Swing   Does the patient walk? 2  Yes   Wheel 50 Feet with Two Turns   Type of Assistance Needed Supervision   Physical Assistance Level No physical assistance   Wheel 50 Feet with Two Turns CARE Score 4   Wheel 150 Feet   Type of Assistance Needed Supervision   Physical Assistance Level No physical assistance   Wheel 150 Feet CARE Score 4   Wheelchair mobility   Does the patient use a wheelchair? 1  Yes   Type of Wheelchair Used 1  Manual   Method Right upper extremity; Left upper extremity   Assistance Provided For Remove Leg Rest;Replace Leg Rest   Distance Level Surface (feet) 150 ft  (x2)   Findings verbal reminder to lock brakes when not moving   Curb or Single Stair   Style negotiated Curb   Type of Assistance Needed Incidental touching; Adaptive equipment   Physical Assistance Level No physical assistance   Comment CGA with RW on 6" curb step performed 2x   1 Step (Curb) CARE Score 4   Stairs   Type Curb   # of Steps 2   Weight Bearing Precautions Fall Risk   Assist Devices Roller Walker   Toilet Transfer   Findings Patient emptied ostomy bag at the start ofs ession (300 mL) charted in appropriate section   Therapeutic Interventions   Strengthening 5x BLE: heel raises, toe raises, LAQ, hip flexion  Verbally reviewed remainder of exercises on the HEP which were performed in previous sessions  Patient provided with handout and was encouraged to begin in room and at time of discharge   Provided patient also with yellow, green and black TB at home for progression   Balance 20x unsupported standing ball toss; 60' ambulation with unilateral railing   Assessment   Treatment Assessment Patient fatigued this session but was willing to participate  He reported and appeared to have low energy and needed increased rest breaks  Despite this, patient was able to mobilize at the same level of assistance, CS to CGA with use of a RW  He was provided with an HEP to initiate completion during rest breaks and then again at home  He will discharge home Monday with HHPT services  DME order placed for w/c and hospital    Problem List Decreased strength;Decreased endurance; Impaired balance;Decreased skin integrity;Orthopedic restrictions;Decreased mobility   Plan   Treatment/Interventions Functional transfer training;LE strengthening/ROM; Elevations; Therapeutic exercise; Endurance training;Cognitive reorientation;Equipment eval/education; Bed mobility;Gait training;Patient/family training; Compensatory technique education   Progress Progressing toward goals   Recommendation   PT Discharge Recommendation Home with home health rehabilitation   PT Therapy Minutes   PT Time In 0930   PT Time Out 1100   PT Total Time (minutes) 90   PT Mode of treatment - Individual (minutes) 90   PT Mode of treatment - Concurrent (minutes) 0   PT Mode of treatment - Group (minutes) 0   PT Mode of treatment - Co-treat (minutes) 0   PT Mode of Treatment - Total time(minutes) 90 minutes   PT Cumulative Minutes 1340

## 2023-01-05 NOTE — PROGRESS NOTES
Progress Note - Infectious Disease   Lorri Luciano 61 y o  male MRN: [id number]  Unit/Bed#: -01 Encounter: [id number]      Impression/Recommendations:  Sepsis     Evolving 12/17:  WBC, HR   Suspect due to persistent left intra-abdominal infection in setting of complex history below, recently completed antibiotics   ROS and exam otherwise negative   Recent Flu/RSV/COVID PCR, CXR negative   Blood cultures negative   Improved with reinitiation of antibiotics, additional drain placement  Rec:  • Continue antibiotics as below  • Drains as below  • Follow temperatures closely  • Recheck CBC in Thursday  • Supportive care as per the primary service     Intra-abdominal abscess     In the setting complex surgical history as below   Initially developed 11/2022 accompanied by ESBL E  coli bacteremia   Status post exploratory laparotomy, right hemicolectomy, temporary abdominal closure 11/16; re-exploration, partial left colectomy, primary ileocolonic anastomosis with proximal diverting loop ileostomy, midline fascial closure on 11/17   More recently developed abscess in hepatectomy bed, also collection +/- leak at area of prior colonic anastamosis, 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   Status post IR drain into perihepatic collection 12/8   Cultures with ESBL E  Coli   Status post 7 days ertapenem after drain placement through 12/15   Developed recurrent sepsis and repeat CT 12/17 shows hepatic bed collection improved, persistent left intra-abdominal collection   Status post perigastic, perisplenic drains 12/20   Cultures again with ESBL E  Coli   Status post 14 days IV antibiotics, now on doxycycline complicated by nausea, anorexia  Improved with change back to Ertapenem    Rec:  • Continue ertapenem for 2 more days (21 day total)  • Continue drains per IR     Possible acute cholecystitis     Status post percutaneous cholecystostomy tube   Alk phos remains markedly elevated, possibly due to drain itself versus known intrahepatic metastases      Metastatic colon cancer   To liver, possible lung   Status post resection, chemotherapy, more recent hepatic resection and ablation, transverse colon resection      CKD   Baseline Cr 1 4       DM      Anemia   Status post multiple transfusions      Antibiotics:  Ertapenem  Antibiotics #19    Subjective:  Patient seen on PM rounds  Patient napping, not awakened  24 Hour Events:  No documented fevers, chills, sweats, nausea, vomiting, or diarrhea  Objective:  Vitals:  Temp:  [98 °F (36 7 °C)-99 2 °F (37 3 °C)] 98 °F (36 7 °C)  HR:  [86-98] 91  Resp:  [16-18] 16  BP: (136-150)/(82-87) 150/87  SpO2:  [97 %-99 %] 98 %  Temp (24hrs), Av 6 °F (37 °C), Min:98 °F (36 7 °C), Max:99 2 °F (37 3 °C)  Current: Temperature: 98 °F (36 7 °C)    Physical Exam:   General:  No acute distress  Psychiatric:  Sleeping  Pulmonary:  Normal respiratory excursion without accessory muscle use  Abdomen:  Soft, nontender  Extremities:  No edema  Skin:  No rashes    Lab Results:  I have personally reviewed pertinent labs  Results from last 7 days   Lab Units 23  0635 22  0639   POTASSIUM mmol/L 4 1 4 1   CHLORIDE mmol/L 108 112*   CO2 mmol/L 19* 21   BUN mg/dL 20 20   CREATININE mg/dL 1 30 1 23   EGFR ml/min/1 73sq m 60 64   CALCIUM mg/dL 8 5 9 0   AST U/L 54* 46*   ALT U/L 14 16   ALK PHOS U/L 1,315* 1,066*     Results from last 7 days   Lab Units 23  0602 22  0458 22  0639   WBC Thousand/uL 12 67* 10 91* 10 15   HEMOGLOBIN g/dL 8 1* 8 0* 7 0*   PLATELETS Thousands/uL 620* 600* 501*           Imaging Studies:   I have personally reviewed pertinent imaging study reports and images in PACS  EKG, Pathology, and Other Studies:   I have personally reviewed pertinent reports

## 2023-01-05 NOTE — PLAN OF CARE
Problem: Prexisting or High Potential for Compromised Skin Integrity  Goal: Skin integrity is maintained or improved  Description: INTERVENTIONS:  - Identify patients at risk for skin breakdown  - Assess and monitor skin integrity  - Assess and monitor nutrition and hydration status  - Monitor labs   - Assess for incontinence   - Turn and reposition patient  - Assist with mobility/ambulation  - Relieve pressure over bony prominences  - Avoid friction and shearing  - Provide appropriate hygiene as needed including keeping skin clean and dry  - Evaluate need for skin moisturizer/barrier cream  - Collaborate with interdisciplinary team   - Patient/family teaching  - Consider wound care consult   Outcome: Progressing     Problem: Potential for Falls  Goal: Patient will remain free of falls  Description: INTERVENTIONS:  - Educate patient/family on patient safety including physical limitations  - Instruct patient to call for assistance with activity   - Consult OT/PT to assist with strengthening/mobility   - Keep Call bell within reach  - Keep bed low and locked with side rails adjusted as appropriate  - Keep care items and personal belongings within reach  - Initiate and maintain comfort rounds  - Make Fall Risk Sign visible to staff  - Offer Toileting every 2 Hours, in advance of need  - Initiate/Maintain bed/chair alarm  - Obtain necessary fall risk management equipment: nonskid socks  - Apply yellow socks and bracelet for high fall risk patients  - Consider moving patient to room near nurses station  Outcome: Progressing     Problem: PAIN - ADULT  Goal: Verbalizes/displays adequate comfort level or baseline comfort level  Description: Interventions:  - Encourage patient to monitor pain and request assistance  - Assess pain using appropriate pain scale  - Administer analgesics based on type and severity of pain and evaluate response  - Implement non-pharmacological measures as appropriate and evaluate response  - Consider cultural and social influences on pain and pain management  - Notify physician/advanced practitioner if interventions unsuccessful or patient reports new pain  Outcome: Progressing     Problem: INFECTION - ADULT  Goal: Absence or prevention of progression during hospitalization  Description: INTERVENTIONS:  - Assess and monitor for signs and symptoms of infection  - Monitor lab/diagnostic results  - Monitor all insertion sites, i e  indwelling lines, tubes, and drains  - Monitor endotracheal if appropriate and nasal secretions for changes in amount and color  - [place] appropriate cooling/warming therapies per order  - Administer medications as ordered  - Instruct and encourage patient and family to use good hand hygiene technique  - Identify and instruct in appropriate isolation precautions for identified infection/condition  Outcome: Progressing     Problem: SAFETY ADULT  Goal: Patient will remain free of falls  Description: INTERVENTIONS:  - Educate patient/family on patient safety including physical limitations  - Instruct patient to call for assistance with activity   - Consult OT/PT to assist with strengthening/mobility   - Keep Call bell within reach  - Keep bed low and locked with side rails adjusted as appropriate  - Keep care items and personal belongings within reach  - Initiate and maintain comfort rounds  - Make Fall Risk Sign visible to staff  - Offer Toileting every 2 Hours, in advance of need  - Initiate/Maintain bed/chair alarm  - Obtain necessary fall risk management equipment: nonskid socks  - Apply yellow socks and bracelet for high fall risk patients  - Consider moving patient to room near nurses station  Outcome: Progressing  Goal: Maintain or return to baseline ADL function  Description: INTERVENTIONS:  -  Assess patient's ability to carry out ADLs; assess patient's baseline for ADL function and identify physical deficits which impact ability to perform ADLs (bathing, care of mouth/teeth, toileting, grooming, dressing, etc )  - Assess/evaluate cause of self-care deficits   - Assess range of motion  - Assess patient's mobility; develop plan if impaired  - Assess patient's need for assistive devices and provide as appropriate  - Encourage maximum independence but intervene and supervise when necessary  - Involve family in performance of ADLs  - Assess for home care needs following discharge   - Consider OT consult to assist with ADL evaluation and planning for discharge  - Provide patient education as appropriate  Outcome: Progressing  Goal: Maintains/Returns to pre admission functional level  Description: INTERVENTIONS:  - Perform BMAT or MOVE assessment daily    - Set and communicate daily mobility goal to care team and patient/family/caregiver  - Collaborate with rehabilitation services on mobility goals if consulted  - Perform Range of Motion 3 times a day  - Reposition patient every 2 hours    - Dangle patient 3 times a day  - Stand patient 3 times a day  - Ambulate patient 3 times a day  - Out of bed to chair 3 times a day   - Out of bed for meals 3 times a day  - Out of bed for toileting  - Record patient progress and toleration of activity level   Outcome: Progressing     Problem: DISCHARGE PLANNING  Goal: Discharge to home or other facility with appropriate resources  Description: INTERVENTIONS:  - Identify barriers to discharge w/patient and caregiver  - Arrange for needed discharge resources and transportation as appropriate  - Identify discharge learning needs (meds, wound care, etc )  - Arrange for interpretive services to assist at discharge as needed  - Refer to Case Management Department for coordinating discharge planning if the patient needs post-hospital services based on physician/advanced practitioner order or complex needs related to functional status, cognitive ability, or social support system  Outcome: Progressing     Problem: MOBILITY - ADULT  Goal: Maintain or return to baseline ADL function  Description: INTERVENTIONS:  -  Assess patient's ability to carry out ADLs; assess patient's baseline for ADL function and identify physical deficits which impact ability to perform ADLs (bathing, care of mouth/teeth, toileting, grooming, dressing, etc )  - Assess/evaluate cause of self-care deficits   - Assess range of motion  - Assess patient's mobility; develop plan if impaired  - Assess patient's need for assistive devices and provide as appropriate  - Encourage maximum independence but intervene and supervise when necessary  - Involve family in performance of ADLs  - Assess for home care needs following discharge   - Consider OT consult to assist with ADL evaluation and planning for discharge  - Provide patient education as appropriate  Outcome: Progressing  Goal: Maintains/Returns to pre admission functional level  Description: INTERVENTIONS:  - Perform BMAT or MOVE assessment daily    - Set and communicate daily mobility goal to care team and patient/family/caregiver  - Collaborate with rehabilitation services on mobility goals if consulted  - Perform Range of Motion 3 times a day  - Reposition patient every 2 hours  - Dangle patient 3 times a day  - Stand patient 3 times a day  - Ambulate patient 3 times a day  - Out of bed to chair 3 times a day   - Out of bed for meals 3 times a day  - Out of bed for toileting  - Record patient progress and toleration of activity level   Outcome: Progressing     Problem: Nutrition/Hydration-ADULT  Goal: Nutrient/Hydration intake appropriate for improving, restoring or maintaining nutritional needs  Description: Monitor and assess patient's nutrition/hydration status for malnutrition  Collaborate with interdisciplinary team and initiate plan and interventions as ordered  Monitor patient's weight and dietary intake as ordered or per policy  Utilize nutrition screening tool and intervene as necessary   Determine patient's food preferences and provide high-protein, high-caloric foods as appropriate       INTERVENTIONS:  - Monitor oral intake, urinary output, labs, and treatment plans  - Assess nutrition and hydration status and recommend course of action  - Evaluate amount of meals eaten  - Assist patient with eating if necessary   - Allow adequate time for meals  - Recommend/ encourage appropriate diets, oral nutritional supplements, and vitamin/mineral supplements  - Order, calculate, and assess calorie counts as needed  - Recommend, monitor, and adjust tube feedings and TPN/PPN based on assessed needs  - Assess need for intravenous fluids  - Provide specific nutrition/hydration education as appropriate  - Include patient/family/caregiver in decisions related to nutrition  Outcome: Progressing

## 2023-01-05 NOTE — PLAN OF CARE
Problem: Prexisting or High Potential for Compromised Skin Integrity  Goal: Skin integrity is maintained or improved  Description: INTERVENTIONS:  - Identify patients at risk for skin breakdown  - Assess and monitor skin integrity  - Assess and monitor nutrition and hydration status  - Monitor labs   - Assess for incontinence   - Turn and reposition patient  - Assist with mobility/ambulation  - Relieve pressure over bony prominences  - Avoid friction and shearing  - Provide appropriate hygiene as needed including keeping skin clean and dry  - Evaluate need for skin moisturizer/barrier cream  - Collaborate with interdisciplinary team   - Patient/family teaching  - Consider wound care consult   Outcome: Progressing     Problem: Potential for Falls  Goal: Patient will remain free of falls  Description: INTERVENTIONS:  - Educate patient/family on patient safety including physical limitations  - Instruct patient to call for assistance with activity   - Consult OT/PT to assist with strengthening/mobility   - Keep Call bell within reach  - Keep bed low and locked with side rails adjusted as appropriate  - Keep care items and personal belongings within reach  - Initiate and maintain comfort rounds  - Make Fall Risk Sign visible to staff  - Offer Toileting every 2 Hours, in advance of need  - Initiate/Maintain bed alarm  - Obtain necessary fall risk management equipment: bed alarm  - Apply yellow socks and bracelet for high fall risk patients  - Consider moving patient to room near nurses station  Outcome: Progressing     Problem: PAIN - ADULT  Goal: Verbalizes/displays adequate comfort level or baseline comfort level  Description: Interventions:  - Encourage patient to monitor pain and request assistance  - Assess pain using appropriate pain scale  - Administer analgesics based on type and severity of pain and evaluate response  - Implement non-pharmacological measures as appropriate and evaluate response  - Consider cultural and social influences on pain and pain management  - Notify physician/advanced practitioner if interventions unsuccessful or patient reports new pain  Outcome: Progressing     Problem: INFECTION - ADULT  Goal: Absence or prevention of progression during hospitalization  Description: INTERVENTIONS:  - Assess and monitor for signs and symptoms of infection  - Monitor lab/diagnostic results  - Monitor all insertion sites, i e  indwelling lines, tubes, and drains  - Monitor endotracheal if appropriate and nasal secretions for changes in amount and color  - [place] appropriate cooling/warming therapies per order  - Administer medications as ordered  - Instruct and encourage patient and family to use good hand hygiene technique  - Identify and instruct in appropriate isolation precautions for identified infection/condition  Outcome: Progressing     Problem: SAFETY ADULT  Goal: Patient will remain free of falls  Description: INTERVENTIONS:  - Educate patient/family on patient safety including physical limitations  - Instruct patient to call for assistance with activity   - Consult OT/PT to assist with strengthening/mobility   - Keep Call bell within reach  - Keep bed low and locked with side rails adjusted as appropriate  - Keep care items and personal belongings within reach  - Initiate and maintain comfort rounds  - Make Fall Risk Sign visible to staff  - Offer Toileting every 2 Hours, in advance of need  - Initiate/Maintain bed alarm  - Obtain necessary fall risk management equipment: bed alarm  - Apply yellow socks and bracelet for high fall risk patients  - Consider moving patient to room near nurses station  Outcome: Progressing  Goal: Maintain or return to baseline ADL function  Description: INTERVENTIONS:  -  Assess patient's ability to carry out ADLs; assess patient's baseline for ADL function and identify physical deficits which impact ability to perform ADLs (bathing, care of mouth/teeth, toileting, grooming, dressing, etc )  - Assess/evaluate cause of self-care deficits   - Assess range of motion  - Assess patient's mobility; develop plan if impaired  - Assess patient's need for assistive devices and provide as appropriate  - Encourage maximum independence but intervene and supervise when necessary  - Involve family in performance of ADLs  - Assess for home care needs following discharge   - Consider OT consult to assist with ADL evaluation and planning for discharge  - Provide patient education as appropriate  Outcome: Progressing  Goal: Maintains/Returns to pre admission functional level  Description: INTERVENTIONS:  - Perform BMAT or MOVE assessment daily    - Set and communicate daily mobility goal to care team and patient/family/caregiver  - Collaborate with rehabilitation services on mobility goals if consulted  - Perform Range of Motion 3 times a day  - Reposition patient every 2 hours    - Dangle patient 3 times a day  - Stand patient 3 times a day  - Ambulate patient 3 times a day  - Out of bed to chair 3 times a day   - Out of bed for meals 3 times a day  - Out of bed for toileting  - Record patient progress and toleration of activity level   Outcome: Progressing     Problem: DISCHARGE PLANNING  Goal: Discharge to home or other facility with appropriate resources  Description: INTERVENTIONS:  - Identify barriers to discharge w/patient and caregiver  - Arrange for needed discharge resources and transportation as appropriate  - Identify discharge learning needs (meds, wound care, etc )  - Arrange for interpretive services to assist at discharge as needed  - Refer to Case Management Department for coordinating discharge planning if the patient needs post-hospital services based on physician/advanced practitioner order or complex needs related to functional status, cognitive ability, or social support system  Outcome: Progressing     Problem: Nutrition/Hydration-ADULT  Goal: Nutrient/Hydration intake appropriate for improving, restoring or maintaining nutritional needs  Description: Monitor and assess patient's nutrition/hydration status for malnutrition  Collaborate with interdisciplinary team and initiate plan and interventions as ordered  Monitor patient's weight and dietary intake as ordered or per policy  Utilize nutrition screening tool and intervene as necessary  Determine patient's food preferences and provide high-protein, high-caloric foods as appropriate       INTERVENTIONS:  - Monitor oral intake, urinary output, labs, and treatment plans  - Assess nutrition and hydration status and recommend course of action  - Evaluate amount of meals eaten  - Assist patient with eating if necessary   - Allow adequate time for meals  - Recommend/ encourage appropriate diets, oral nutritional supplements, and vitamin/mineral supplements  - Order, calculate, and assess calorie counts as needed  - Recommend, monitor, and adjust tube feedings and TPN/PPN based on assessed needs  - Assess need for intravenous fluids  - Provide specific nutrition/hydration education as appropriate  - Include patient/family/caregiver in decisions related to nutrition  Outcome: Progressing     Problem: MOBILITY - ADULT  Goal: Maintain or return to baseline ADL function  Description: INTERVENTIONS:  -  Assess patient's ability to carry out ADLs; assess patient's baseline for ADL function and identify physical deficits which impact ability to perform ADLs (bathing, care of mouth/teeth, toileting, grooming, dressing, etc )  - Assess/evaluate cause of self-care deficits   - Assess range of motion  - Assess patient's mobility; develop plan if impaired  - Assess patient's need for assistive devices and provide as appropriate  - Encourage maximum independence but intervene and supervise when necessary  - Involve family in performance of ADLs  - Assess for home care needs following discharge   - Consider OT consult to assist with ADL evaluation and planning for discharge  - Provide patient education as appropriate  Outcome: Progressing  Goal: Maintains/Returns to pre admission functional level  Description: INTERVENTIONS:  - Perform BMAT or MOVE assessment daily    - Set and communicate daily mobility goal to care team and patient/family/caregiver  - Collaborate with rehabilitation services on mobility goals if consulted  - Perform Range of Motion 3 times a day  - Reposition patient every 2 hours    - Dangle patient 3 times a day  - Stand patient 3 times a day  - Ambulate patient 3 times a day  - Out of bed to chair 3 times a day   - Out of bed for meals 3 times a day  - Out of bed for toileting  - Record patient progress and toleration of activity level   Outcome: Progressing

## 2023-01-05 NOTE — PROGRESS NOTES
[facility] Occupational Therapy Daily Note  Patient Active Problem List   Diagnosis    Type 2 diabetes mellitus without complication, with long-term current use of insulin ([facility] )    Benign essential hypertension    Mixed hyperlipidemia    Erectile dysfunction    Low testosterone    Obesity (BMI 30-39  9)    Testicular hypogonadism    Incarcerated umbilical hernia    Left ureteral calculus    Type 2 diabetes mellitus with hyperlipidemia (HCC)    Colonic mass    Microcytic anemia    Hypokalemia    Transaminitis    Thrombocytosis    Iron deficiency anemia, unspecified    Malignant neoplasm of transverse colon (HCC)    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)    Colon cancer metastasized to liver (HCC)    Colostomy prolapse (HCC)    Other fatigue    Cervical radiculopathy    Encephalopathy    Flash pulmonary edema (HCC)    MR (mitral regurgitation)    Bacteremia    ESBL (extended spectrum beta-lactamase) producing bacteria infection    Acute respiratory failure with hypoxia (HCC)    Severe protein-calorie malnutrition (HCC)    Acute on chronic kidney failure (HCC)    Sepsis (HCC)    Abscess    Acute pain    Leukocytosis    Dehiscence of incision    Elevated alkaline phosphatase level       Past Medical History:   Diagnosis Date    Abdominal pain 03/12/2022    Acute renal failure ([facility] )     [id number] resolved    Cancer ([facility] )     Diabetes mellitus ([facility] )     Enteritis 08/23/2016    Gastroparesis due to DM ([facility] ) 08/23/2016    GERD (gastroesophageal reflux disease)     Hernia, ventral 08/04/2016    Hyperlipidemia     Hypertension     Morbid obesity ([facility] ) 04/17/2018    Postoperative visit 03/02/2022    SIRS (systemic inflammatory response syndrome) ([facility] ) 03/12/2022    Snoring     Stage 3a chronic kidney disease ([facility] ) 02/19/2022     Etiologic Diagnosis: Critical illness myopathy  Restrictions/Precautions  Precautions: Fall Risk, Contact/isolation, Multiple lines (VICTORINO drain, ileostomy, IR drain)  Weight Bearing Restrictions: No  ROM Restrictions: No  ADL Team Goal: Patient will require assist with ADLs with least restrictive device upon completion of rehab program  Occupational Therapy LTG's  Eating Oral care Bathing LB dress UB dress   Eating Goal: 06  Independent - Patient completes the activity by him/herself with no assistance from a helper  Oral Hygiene Goal: 06  Independent - Patient completes the activity by him/herself with no assistance from a helper  Shower/bathe self Goal: 04  Supervision or touching assistance- [place] provides VERBAL CUES or supervision throughout activity  Lower body dressing Goal: 04  Supervision or touching assistance- [place] provides VERBAL CUES or supervision throughout activity  Upper body dressing Goal: 04  Supervision or touching assistance- [place] provides VERBAL CUES or supervision throughout activity  Toileting Toilet txf Func txf IADL Med    Toileting hygiene Goal: 04  Supervision or touching assistance- [place] provides VERBAL CUES or supervision throughout activity  Toilet transfer Goal: 04  Supervision or touching assistance- [place] provides VERBAL CUES or supervision throughout activity       Assist Level: Supervision   OT interventions: Treatment/Interventions: ADL retraining, Functional transfer training, Therapeutic exercise, Endurance training, Patient/family training, Equipment eval/education, Bed mobility, Compensatory technique education  Discharge Plan:  OT Discharge Recommendation: Home with home health rehabilitation   DME: Equipment Recommended:  (RW, WC, SHower chair/tub bench(if applicable)),  ,       94/64/52 1230   Pain Assessment   Pain Assessment Tool FLACC   Pain Rating: FLACC (Activity) - Face 1   Pain Rating: FLACC (Activity) - Legs 0   Pain Rating: FLACC (Activity) - Activity 0   Pain Rating: FLACC (Activity) - Cry 0   Pain Rating: FLACC (Activity) - Consolability 0   Score: FLACC (Activity) 1   Lifestyle   Autonomy "I am so burned out from all of this "   Sit to Stand Type of Assistance Needed Supervision   Sit to Stand CARE Score 4   Bed-Chair Transfer   Type of Assistance Needed Supervision   Comment RW  appeared to complete with ease  Chair/Bed-to-Chair Transfer CARE Score 4   Toileting Hygiene   Type of Assistance Needed Set-up / clean-up   Comment pt complete ileostomy care sitting EOB  req set up of tools  Toileting Hygiene CARE Score 5   Toilet Transfer   Type of Assistance Needed Incidental touching   Toilet Transfer CARE Score 4   Meal Prep   Meal Preparation pt engaging in discussions about food preferences in light of reduced appetite  Clarissa Thornton present and assisting in discussion about foods to provide most nutritional value and comfort to increase intake  Discussed grazing with food and setting goals for eating a sandwhich in an hour, or space items out  Pt identifies peanut butter and jelly sandwich as an option  Pt engages in meal prep with preparation of sandwhich  Pt reports enjoying making the sandwhich  inc time to manage small containers 2* dec sensation, but utilized other methods to get them open  pt eating 1/4 sandwhich with goal for another 1/4 in the next hour  Functional Standing Tolerance   Time 2 min   Activity toe and heel lifts in stance w/ RW  Cognition   Comments today pt reporting feeling burned out, "ready to go home " Pt reports feeling discouraged with reduced appetite and feeling nauseated  cont to provide pt an outlet to discuss fears, goals, and feelings  Pt enages in motivational interviewing with focus on reflecion of goals obtained  discussed support systems in place  discussed healing at home with comforts of his family and firneds  Additional Activities   Additional Activities Comments discussed arranging visiting times for family and friends  Pt receptive and states that he has limited visitors while he is here  pt fearful of running out of energy with visitors     Assessment   Treatment Assessment pt somewhat reluctant to participate but willing once he got moving  pt cont to make progress despite reporting feeling not well  cont to focus sessions on activity tolerance, ADLs, OOB tolerance, transfers and standing balance/tolerance     Prognosis Fair   Plan   Progress Progressing toward goals   OT Therapy Minutes   OT Time In 1230   OT Time Out 1400   OT Total Time (minutes) 90   OT Mode of treatment - Individual (minutes) 90   OT Mode of treatment - Concurrent (minutes) 0   OT Mode of treatment - Group (minutes) 0   OT Mode of treatment - Co-treat (minutes) 0   OT Mode of Treatment - Total time(minutes) 90 minutes   OT Cumulative Minutes 8638

## 2023-01-06 LAB
ALBUMIN SERPL BCP-MCNC: 1.6 G/DL (ref 3.5–5)
ALP SERPL-CCNC: 1025 U/L (ref 46–116)
ALT SERPL W P-5'-P-CCNC: 12 U/L (ref 12–78)
ANION GAP SERPL CALCULATED.3IONS-SCNC: 8 MMOL/L (ref 4–13)
AST SERPL W P-5'-P-CCNC: 43 U/L (ref 5–45)
BASOPHILS # BLD AUTO: 0.07 THOUSANDS/ÂΜL (ref 0–0.1)
BASOPHILS NFR BLD AUTO: 1 % (ref 0–1)
BILIRUB SERPL-MCNC: 0.68 MG/DL (ref 0.2–1)
BUN SERPL-MCNC: 20 MG/DL (ref 5–25)
CALCIUM ALBUM COR SERPL-MCNC: 10.6 MG/DL (ref 8.3–10.1)
CALCIUM SERPL-MCNC: 8.7 MG/DL (ref 8.3–10.1)
CHLORIDE SERPL-SCNC: 107 MMOL/L (ref 96–108)
CO2 SERPL-SCNC: 20 MMOL/L (ref 21–32)
CREAT SERPL-MCNC: 1.13 MG/DL (ref 0.6–1.3)
DME PARACHUTE DELIVERY DATE REQUESTED: NORMAL
DME PARACHUTE DELIVERY NOTE: NORMAL
DME PARACHUTE DELIVERY NOTE: NORMAL
DME PARACHUTE ITEM DESCRIPTION: NORMAL
DME PARACHUTE ORDER STATUS: NORMAL
DME PARACHUTE SUPPLIER NAME: NORMAL
DME PARACHUTE SUPPLIER PHONE: NORMAL
EOSINOPHIL # BLD AUTO: 0 THOUSAND/ÂΜL (ref 0–0.61)
EOSINOPHIL NFR BLD AUTO: 0 % (ref 0–6)
ERYTHROCYTE [DISTWIDTH] IN BLOOD BY AUTOMATED COUNT: 17.7 % (ref 11.6–15.1)
GFR SERPL CREATININE-BSD FRML MDRD: 71 ML/MIN/1.73SQ M
GLUCOSE P FAST SERPL-MCNC: 123 MG/DL (ref 65–99)
GLUCOSE SERPL-MCNC: 121 MG/DL (ref 65–140)
GLUCOSE SERPL-MCNC: 123 MG/DL (ref 65–140)
GLUCOSE SERPL-MCNC: 126 MG/DL (ref 65–140)
GLUCOSE SERPL-MCNC: 136 MG/DL (ref 65–140)
GLUCOSE SERPL-MCNC: 154 MG/DL (ref 65–140)
GLUCOSE SERPL-MCNC: 195 MG/DL (ref 65–140)
HCT VFR BLD AUTO: 26.1 % (ref 36.5–49.3)
HGB BLD-MCNC: 8.2 G/DL (ref 12–17)
IMM GRANULOCYTES # BLD AUTO: 0.22 THOUSAND/UL (ref 0–0.2)
IMM GRANULOCYTES NFR BLD AUTO: 2 % (ref 0–2)
LYMPHOCYTES # BLD AUTO: 2.05 THOUSANDS/ÂΜL (ref 0.6–4.47)
LYMPHOCYTES NFR BLD AUTO: 17 % (ref 14–44)
MCH RBC QN AUTO: 26.4 PG (ref 26.8–34.3)
MCHC RBC AUTO-ENTMCNC: 31.4 G/DL (ref 31.4–37.4)
MCV RBC AUTO: 84 FL (ref 82–98)
MONOCYTES # BLD AUTO: 1.39 THOUSAND/ÂΜL (ref 0.17–1.22)
MONOCYTES NFR BLD AUTO: 11 % (ref 4–12)
NEUTROPHILS # BLD AUTO: 8.68 THOUSANDS/ÂΜL (ref 1.85–7.62)
NEUTS SEG NFR BLD AUTO: 69 % (ref 43–75)
NRBC BLD AUTO-RTO: 0 /100 WBCS
PLATELET # BLD AUTO: 480 THOUSANDS/UL (ref 149–390)
PMV BLD AUTO: 9.9 FL (ref 8.9–12.7)
POTASSIUM SERPL-SCNC: 3.7 MMOL/L (ref 3.5–5.3)
PROT SERPL-MCNC: 8.4 G/DL (ref 6.4–8.4)
RBC # BLD AUTO: 3.11 MILLION/UL (ref 3.88–5.62)
SODIUM SERPL-SCNC: 135 MMOL/L (ref 135–147)
WBC # BLD AUTO: 12.41 THOUSAND/UL (ref 4.31–10.16)

## 2023-01-06 RX ADMIN — PANTOPRAZOLE SODIUM 40 MG: 40 TABLET, DELAYED RELEASE ORAL at 06:23

## 2023-01-06 RX ADMIN — Medication 1 TABLET: at 10:38

## 2023-01-06 RX ADMIN — ACETAMINOPHEN 975 MG: 325 TABLET, FILM COATED ORAL at 13:55

## 2023-01-06 RX ADMIN — MELATONIN TAB 3 MG 3 MG: 3 TAB at 21:34

## 2023-01-06 RX ADMIN — HEPARIN SODIUM 5000 UNITS: 5000 INJECTION INTRAVENOUS; SUBCUTANEOUS at 13:55

## 2023-01-06 RX ADMIN — CALCIUM CARBONATE (ANTACID) CHEW TAB 500 MG 500 MG: 500 CHEW TAB at 14:02

## 2023-01-06 RX ADMIN — CHOLECALCIFEROL TAB 10 MCG (400 UNIT) 400 UNITS: 10 TAB at 10:38

## 2023-01-06 RX ADMIN — ONDANSETRON 4 MG: 4 TABLET, ORALLY DISINTEGRATING ORAL at 14:02

## 2023-01-06 RX ADMIN — ONDANSETRON 4 MG: 4 TABLET, ORALLY DISINTEGRATING ORAL at 06:23

## 2023-01-06 RX ADMIN — INSULIN LISPRO 1 UNITS: 100 INJECTION, SOLUTION INTRAVENOUS; SUBCUTANEOUS at 16:49

## 2023-01-06 RX ADMIN — SIMETHICONE 80 MG: 80 TABLET, CHEWABLE ORAL at 10:37

## 2023-01-06 RX ADMIN — HEPARIN SODIUM 5000 UNITS: 5000 INJECTION INTRAVENOUS; SUBCUTANEOUS at 21:33

## 2023-01-06 RX ADMIN — OXYCODONE HYDROCHLORIDE 5 MG: 5 TABLET ORAL at 18:32

## 2023-01-06 RX ADMIN — METHOCARBAMOL 500 MG: 500 TABLET ORAL at 10:37

## 2023-01-06 RX ADMIN — ATORVASTATIN CALCIUM 40 MG: 40 TABLET, FILM COATED ORAL at 10:37

## 2023-01-06 RX ADMIN — TAMSULOSIN HYDROCHLORIDE 0.4 MG: 0.4 CAPSULE ORAL at 16:48

## 2023-01-06 RX ADMIN — INSULIN LISPRO 6 UNITS: 100 INJECTION, SOLUTION INTRAVENOUS; SUBCUTANEOUS at 09:31

## 2023-01-06 RX ADMIN — HEPARIN SODIUM 5000 UNITS: 5000 INJECTION INTRAVENOUS; SUBCUTANEOUS at 06:21

## 2023-01-06 RX ADMIN — INSULIN GLARGINE 10 UNITS: 100 INJECTION, SOLUTION SUBCUTANEOUS at 21:34

## 2023-01-06 RX ADMIN — PANTOPRAZOLE SODIUM 40 MG: 40 TABLET, DELAYED RELEASE ORAL at 16:48

## 2023-01-06 RX ADMIN — INSULIN LISPRO 1 UNITS: 100 INJECTION, SOLUTION INTRAVENOUS; SUBCUTANEOUS at 12:42

## 2023-01-06 RX ADMIN — ERTAPENEM SODIUM 1000 MG: 1 INJECTION, POWDER, LYOPHILIZED, FOR SOLUTION INTRAMUSCULAR; INTRAVENOUS at 21:33

## 2023-01-06 RX ADMIN — ACETAMINOPHEN 975 MG: 325 TABLET, FILM COATED ORAL at 06:21

## 2023-01-06 RX ADMIN — AMLODIPINE BESYLATE 5 MG: 5 TABLET ORAL at 10:37

## 2023-01-06 RX ADMIN — ACETAMINOPHEN 975 MG: 325 TABLET, FILM COATED ORAL at 21:34

## 2023-01-06 RX ADMIN — SIMETHICONE 80 MG: 80 TABLET, CHEWABLE ORAL at 21:33

## 2023-01-06 RX ADMIN — AMLODIPINE BESYLATE 5 MG: 5 TABLET ORAL at 18:25

## 2023-01-06 RX ADMIN — METHOCARBAMOL 500 MG: 500 TABLET ORAL at 18:24

## 2023-01-06 RX ADMIN — SIMETHICONE 80 MG: 80 TABLET, CHEWABLE ORAL at 18:24

## 2023-01-06 RX ADMIN — ASPIRIN 81 MG CHEWABLE TABLET 81 MG: 81 TABLET CHEWABLE at 10:37

## 2023-01-06 NOTE — PROGRESS NOTES
Progress Note - Infectious Disease   Enedina Lama 61 y o  male MRN: [id number]  Unit/Bed#: -01 Encounter: [id number]      Impression/Recommendations:  Sepsis     Evolving 12/17:  WBC, HR   Suspect due to persistent left intra-abdominal infection in setting of complex history below, recently completed antibiotics   ROS and exam otherwise negative   Recent Flu/RSV/COVID PCR, CXR negative   Blood cultures negative   Improved with reinitiation of antibiotics, additional drain placement  Rec:  • Continue antibiotics as below  • Drains as below  • Follow temperatures closely  • Recheck CBC in Thursday  • Supportive care as per the primary service     Intra-abdominal abscess     In the setting complex surgical history as below   Initially developed 11/2022 accompanied by ESBL E  coli bacteremia   Status post exploratory laparotomy, right hemicolectomy, temporary abdominal closure 11/16; re-exploration, partial left colectomy, primary ileocolonic anastomosis with proximal diverting loop ileostomy, midline fascial closure on 11/17   More recently developed abscess in hepatectomy bed, also collection +/- leak at area of prior colonic anastamosis, 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   Status post IR drain into perihepatic collection 12/8   Cultures with ESBL E  Coli   Status post 7 days ertapenem after drain placement through 12/15   Developed recurrent sepsis and repeat CT 12/17 shows hepatic bed collection improved, persistent left intra-abdominal collection   Status post perigastic, perisplenic drains 12/20   Cultures again with ESBL E  Coli   Status post 14 days IV antibiotics, now on doxycycline complicated by nausea, anorexia   Improved with change back to Ertapenem    Rec:  • Discontinue ertapenem tomorrow to complete 21 day total antibiotics  • Continue drains per IR     Possible acute cholecystitis     Status post percutaneous cholecystostomy tube   Alk phos remains markedly elevated, possibly due to drain itself versus known intrahepatic metastases      Metastatic colon cancer   To liver, possible lung   Status post resection, chemotherapy, more recent hepatic resection and ablation, transverse colon resection      CKD   Baseline Cr 1 4       DM      Anemia   Status post multiple transfusions  The patient is stable from an ID standpoint  I will reassess the patient on   Please call in the interim with new questions      Antibiotics:  Ertapenem  Antibiotics #20    Subjective:  Patient seen on AM rounds  Doing well  Ate breakfast   Denies fevers, chills, sweats, nausea, vomiting, or diarrhea  Pain from drains improved  24 Hour Events:  No documented fevers, chills, sweats, nausea, vomiting, or diarrhea  Objective:  Vitals:  Temp:  [98 °F (36 7 °C)-98 6 °F (37 °C)] 98 °F (36 7 °C)  HR:  [78-93] 78  Resp:  [16-17] 16  BP: (126-136)/(79-86) 131/80  SpO2:  [98 %-99 %] 99 %  Temp (24hrs), Av 3 °F (36 8 °C), Min:98 °F (36 7 °C), Max:98 6 °F (37 °C)  Current: Temperature: 98 °F (36 7 °C)    Physical Exam:   General:  No acute distress  Psychiatric:  Awake and alert  Pulmonary:  Normal respiratory excursion without accessory muscle use  Abdomen:  Soft, nontender  Extremities:  No edema  Skin:  No rashes    Lab Results:  I have personally reviewed pertinent labs  Results from last 7 days   Lab Units 23  0618 23  0635   POTASSIUM mmol/L 3 7 4 1   CHLORIDE mmol/L 107 108   CO2 mmol/L 20* 19*   BUN mg/dL 20 20   CREATININE mg/dL 1 13 1 30   EGFR ml/min/1 73sq m 71 60   CALCIUM mg/dL 8 7 8 5   AST U/L 43 54*   ALT U/L 12 14   ALK PHOS U/L 1,025* 1,315*     Results from last 7 days   Lab Units 23  0618 23  0602 22  0458   WBC Thousand/uL 12 41* 12 67* 10 91*   HEMOGLOBIN g/dL 8 2* 8 1* 8 0*   PLATELETS Thousands/uL 480* 620* 600*           Imaging Studies:   I have personally reviewed pertinent imaging study reports and images in PACS      EKG, Pathology, and Other Studies:   I have personally reviewed pertinent reports

## 2023-01-06 NOTE — PROGRESS NOTES
01/06/23 1400   Pain Assessment   Pain Assessment Tool 0-10   Pain Score No Pain   Restrictions/Precautions   Precautions Fall Risk;Contact/isolation;Multiple lines;Supervision on toilet/commode  (Dillan drain, ileostomy, IR Drain)   Weight Bearing Restrictions No   ROM Restrictions No   Cognition   Overall Cognitive Status WFL   Arousal/Participation Cooperative   Attention Attends with cues to redirect   Orientation Level Oriented X4   Memory Within functional limits   Following Commands Follows one step commands with increased time or repetition   Sit to Lying   Type of Assistance Needed Physical assistance;Verbal cues; Adaptive equipment   Physical Assistance Level 26%-50%   Comment pt fatigued end pf session and required to BLE  Sit to Lying CARE Score 3   Lying to Sitting on Side of Bed   Type of Assistance Needed Supervision   Lying to Sitting on Side of Bed CARE Score 4   Sit to Stand   Type of Assistance Needed Supervision; Adaptive equipment   Comment CS with RW   Sit to Stand CARE Score 4   Bed-Chair Transfer   Type of Assistance Needed Supervision; Adaptive equipment   Comment CS with RW   Chair/Bed-to-Chair Transfer CARE Score 4   Transfer Bed/Chair/Wheelchair   Adaptive Equipment Roller Walker   Walk 10 Feet   Type of Assistance Needed Supervision; Adaptive equipment   Comment CS with RW   Walk 10 Feet CARE Score 4   Walk 150 Feet   Reason if not Attempted Safety concerns   Walk 150 Feet CARE Score 88   Walking 10 Feet on Uneven Surfaces   Reason if not Attempted Safety concerns   Walking 10 Feet on Uneven Surfaces CARE Score 88   Ambulation   Primary Mode of Locomotion Prior to Admission Walk   Distance Walked (feet) 40 ft  (15')   Assist Device Roller Walker   Gait Pattern Slow Josefina; Forward Flexion;Narrow LUIS; Improper weight shift   Limitations Noted In Heel Strike; Sequencing;Speed;Strength;Swing   Provided Assistance with: Balance   Walk Assist Level Close Supervision   Does the patient walk? 2   Yes Wheel 50 Feet with Two Turns   Type of Assistance Needed Supervision   Wheel 50 Feet with Two Turns CARE Score 4   Wheel 150 Feet   Type of Assistance Needed Supervision   Wheel 150 Feet CARE Score 4   Wheelchair mobility   Does the patient use a wheelchair? 1  Yes   Type of Wheelchair Used 1  Manual   Method Right upper extremity; Left upper extremity   Assistance Provided For Remove Leg Rest;Replace Leg Rest;Remove armrests;Replace armrests   Distance Level Surface (feet) 150 ft   Curb or Single Stair   Style negotiated Curb   Type of Assistance Needed Incidental touching; Adaptive equipment;Verbal cues   Comment CGA with RW over 6" curb step x2 reps  VC's to get closer to edge prior to putting RW down on ground  1 Step (Curb) CARE Score 4   12 Steps   Reason if not Attempted Safety concerns   12 Steps CARE Score 88   Stairs   Type Curb   # of Steps 2   Weight Bearing Precautions Fall Risk   Assist Devices Roller Walker   Toilet Transfer   Comment stood with CGA to urinate  Therapeutic Interventions   Other 119/75 seated to 102/67 standing, pt symptomatic  Equipment Use   NuStep L1 x5 min start of session as pt's BP initiall dropped  Assessment   Treatment Assessment Pt participated in skilled PT session with increased focus on gait, increased functional transfers, increased endurance training and stair management  Pt required increased rest breaks and emotional support this session as he stated he felt very "weak" and initially "nauseous"  Upon entering room pt's nurse Sylvia Coon was providing pt with medication for nausea  Towards end of session pt felt he would not be able to tolerate any more as he was just too fatigued to cont  Discussed the cont fluctuation and how he will need to be aware of when he is able and not able to perform tasks at home  Pt will cont to work on increased functional transfers, [place] dist amb, stair management and WC mobility for anticipated discharge pending HHPT services 1/9  Problem List Decreased strength;Decreased endurance; Impaired balance;Decreased skin integrity;Orthopedic restrictions;Decreased mobility   Barriers to Discharge Inaccessible home environment   PT Barriers   Functional Limitation Stair negotiation   Plan   Treatment/Interventions Functional transfer training;LE strengthening/ROM; Therapeutic exercise; Endurance training;Gait training   Progress Progressing toward goals   Recommendation   PT Discharge Recommendation Home with home health rehabilitation   Equipment Recommended Wheelchair   PT Therapy Minutes   PT Time In 1400   PT Time Out 1515   PT Total Time (minutes) 75   PT Mode of treatment - Individual (minutes) 75   PT Mode of treatment - Concurrent (minutes) 0   PT Mode of treatment - Group (minutes) 0   PT Mode of treatment - Co-treat (minutes) 0   PT Mode of Treatment - Total time(minutes) 75 minutes   PT Cumulative Minutes 1415   Therapy Time missed   Time missed?  Yes   Amount of time missed 15   Reason for time missed Extreme fatigue

## 2023-01-06 NOTE — QUICK NOTE
Evaluated wound at bedside  Wound base with soft moist slough present  No signs of erythema, drainage, infection presents  No induration or fluctuance      Plan:   -Continue local wound care per recommendations  -Dressings to be changed by nursing as scheduled    Britany Jiang

## 2023-01-06 NOTE — PROGRESS NOTES
PM&R PROGRESS NOTE:  Emilee Mccartney 61 y o  male MRN: [id number]  Unit/Bed#: -05 Encounter: [id number]        Rehabilitation Diagnosis: Impairment of mobility, safety and Activities of Daily Living (ADLs) due to Neurologic Conditions:  03 8  Neuromuscular Disorders    HPI: Gary Braga is a 61 y o  male with a medical history of HTN, HLD, GERD, and ventral hernia that presented to [facility] on 11/7 for an elective surgical procedure d/t metastatic colon adenocarcinoma by Dr Marilin Camejo  Patient presented to hem/onc 2/22 after CT abdomen showed transverse colonic apple core lesion and innumerable hepatic metastases  MRI revealed multiple hepatic metastasis with smaller lesions in left lobe and larger lesions in right lobe  Patient completed chemotherapy, colostomy at that time  On 11/7 patient underwent exploratory laparotomy, segment 3 liver resection, ablation, intraoperative ultrasound, and colostomy reversal  This was c/b left upper quadrant hematoma, imaging showed suspected leak from transverse colon anastomosis  On 11/16, patient underwent exp lap which revealed LUQ infected hematoma, defect in transverse colon anastomosis with extravasation of succus  Massively dilated cecum requiring resection  He had a right hemicolectomy, left in discontinuity and temporary abdominal closure  On 11/17, re-exploration, partial descending colectomy, primary colonic anastomosis created with diverting loop ileostomy and midline wound VAC placement  He completed course of IV abx for ESBL bacteremia from abdominal abscess  Midline abdominal incision wet to dry dressings and packing to old stoma site  12/6 abdominal incision noted to have yellow drainage  CT abdomen showed abdominal abscess and distended gallbladder  Patient underwent percutaneous cholecystostomy tube insertion and hepatectomy abscess drainage  ID consulted, IV abx for 1 week  Nephrology consulted d/t ALEXY, creatinine baseline 1 2-1 4   On 12/14 patient experienced chest pain, appeared musculoskeletal with no further work-up  Patient deemed medically stable and admitted to [facility] on 12/14/2022  SUBJECTIVE: Patient seen face to face  Reports intermittent nausea, currently denies nausea  Appetite today improved  All drains flushed and emptied this am  IR seen patient 01/05, continue with flushes and follow-up in 2 weeks for tube check  No CP, SOB, fever, chills, N/V, abdominal pain  ASSESSMENT: Stable, progressing    PLAN:  1  Nausea subsided in am, appetite improved  Continue to encourage protein, fluids  Nausea returned in afternoon, encouraged patient to take simethicone and zofran  Monitor nausea  2  Drains- continue to monitor output and consult IR with concerns  3  Labs stable- continue with current rehabilitation program      Rehabilitation    • Functional deficits:  Decreased foot clearance, shuffle gait  • Continue current rehabilitation plan of care to maximize function      • Functional update:   · PT: mobility- supervision, transfers- incidental touching, ambulation- incidental touching 30' RW, idental touching, w/c mobility- supervision 150' x2  · OT: ADL- supervision, LB dressing- incidental touching, toileting- incidental touching   · Estimated Discharge: ADD 01/09 with home PT, OT, RN    Pain  • Tylenol, oxycodone    DVT prophylaxis  • heparin    Bladder plan  • Continent    Bowel plan  • Ileostomy     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 liver [facility])  Assessment & Plan  · MRI-multiple hepatic metastasis; smaller lesions in left lobe, larger lesions in right lobe  · 11/7 Exp Lap, resection of hepatic segment 3, resection of transverse colon with reversal of loop colostomy (Dr Geovanna Rosen)  · 11/16- right hemicolectomy, left discontinuity and temporary abdominal closure device placed  · 11/17- re-exploration, partial descending colectomy, primary colocolonic anastomosis with diverting loop ileostomy, midline VAC placement  · CT showed abdominal abscess and distended gallbladder  · 12/8- IR percutaneous cholecystostomy tube, hepatectomy abscess drain placement  ·  IR on 12/20 additional drains placed for a perisplenic abscess as well as a splenic recess abscess  · Total of 3 drains in place  * Malignant neoplasm of transverse colon [facility])  Assessment & Plan  · Transverse colonic apple core lesion and innumerable hepatic metastases  · S/p Colostomy placement 2/22  · RCW port-a-cath, accessed  · S/p palliative chemo  · Follows with Dr Eliud Jade (palliative)  · Follows hem/onc (Dr Ben Scott)    Elevated alkaline phosphatase level  Assessment & Plan  1025, (previous 1315)  Hepatic mets  Follow level  ?bone involvement - no evidence of ROM limitations or pain complaints on survey  Follow up with Surg Onc    Dehiscence of incision  Assessment & Plan  Improving  Wound Care following and surgical oncology following   Slough present  [place] ordered 12/29/22  Surgical oncology- no signs of infection, recommend Mesalt, ABD, paper tape     [address] as follows:   Skin Care Plan:  1-Midline Incision and Right Abdomen Wounds: Cleanse wounds with NS and pat dry  Apply Santyl to slough tissue on wound bed nickel thick  Cover with ABD and secure with minimal tape  Change daily or PRN soilage or displacement  2-Turn/reposition q2h or when medically stable for pressure re-distribution on skin   3-Elevate heels to offload pressure  4-Moisturize skin daily with skin nourishing cream  5-Ehob cushion in chair when out of bed  6-Preventative Hydraguard to bilateral heels and bilateral sacro-buttocks BID and PRN          Leukocytosis  Assessment & Plan  WBC 12 41 (previous 12 67)    Acute pain  Assessment & Plan  Managed on Scheduled Tylenol 975 mg [id number]  Managed on Robaxin 500 mg BId  Oxycodone IR PRN  Moderately managed     Abscess  Assessment & Plan  · Abdominal abscess- ESBL E Coli - Contact precautions  · Zosyn completed  · Completed Ertapenem course 12/28/22  · Starting Doxycycline 100 mg Q12h 12/30/22 per ID consultants    · Doxycycline stopped, Ertapenem restarted complete 01/07    · 3 drains placed by IR  · Monitor 3 drain output:  · Abscess drain abdomen A: 0 cc   · Abscess drain back: 0-10 cc  · Abscess drain abdomen B: 15 - 20cc    1/3- tube check w/ IR  Tube position check  Patient having some discomfort in left abdomen while supine - per IR tubes in place, bulbs changed, recheck tube placement in 2 weeks  1/4- drain #3 developed sanguineous drainage with flushing, IR evaluated - flushing appropriately  Continue daily flushes on all the drains and monitor output  Reach out to IR if drainage is <10 cc per day for 2 days  Sepsis ([address] )  Assessment & Plan  · SIRS versus sepsis at this time given his vitals, leukocytosis, and complicated infection history  · Mgmt per ID, IM  · 12/20-IR perisplenic, perigastric drain placement for 2 additional abscesses-  · Perigastric abscess +E  Coli ESBL  · Blood cultures negative     Acute on chronic kidney failure [facility])  Assessment & Plan  · Creatinine baseline 1 2-1 4  · Cr = 1 3  · Monitor BMP    Bacteremia  Assessment & Plan  · Remains on Abx     Iron deficiency anemia, unspecified  Assessment & Plan  · Hbg = 8 1  · 12/15-1 unit PRBc  · 12/16- symptomatic- 1 unit PRBc  · 12/28- 1 unit PRBc  · Monitor CBC    Obesity (BMI 30-39  9)  Assessment & Plan  · BMI 33 0    Benign essential hypertension  Assessment & Plan  · Home: Norvasc 5 mg BID  · Here: Norvasc 5 mg BID  · Adjust medication as needed    Type 2 diabetes mellitus without complication, with long-term current use of insulin (HCC)  Assessment & Plan  · HbgA1c 8 0 (9/22)  · Home monitoring with [place]  · Home: Lantus 12 units, Humalog 3 units with meals, Januvia  · Here: Lantus 6 units,  Humalog 6 units with meals, SSI, Lantus   · Follow with PCP (Dr Christiano Tavares)      [address] consultants medical co-management  Labs, medications, and imaging reviewed  ROS:  Review of Systems   A 10 point review of systems was negative except for what is noted in the HPI  OBJECTIVE:   /80 (BP Location: Left arm)   Pulse 78   Temp 98 °F (36 7 °C) (Oral)   Resp 16   Ht 5' 10" (1 778 m)   Wt 104 kg (230 lb)   SpO2 99%   BMI 33 00 kg/m²     Physical Exam  Constitutional:       Appearance: Normal appearance  HENT:      Head: Normocephalic and atraumatic  Mouth/Throat:      Mouth: Mucous membranes are moist    Cardiovascular:      Rate and Rhythm: Normal rate and regular rhythm  Pulses: Normal pulses  Heart sounds: Normal heart sounds  Pulmonary:      Effort: Pulmonary effort is normal       Breath sounds: Normal breath sounds  Abdominal:      General: Bowel sounds are normal       Palpations: Abdomen is soft  Comments: +ileostomy, + 3 VICTORINO abscess drains, + per chol   Musculoskeletal:         General: Normal range of motion  Cervical back: Normal range of motion  Skin:     General: Skin is warm and dry  Capillary Refill: Capillary refill takes less than 2 seconds  Neurological:      Mental Status: He is alert and oriented to person, place, and time     Psychiatric:         Mood and Affect: Mood normal          Judgment: Judgment normal         Lab Results   Component Value Date    WBC 12 41 (H) 01/06/2023    HGB 8 2 (L) 01/06/2023    HCT 26 1 (L) 01/06/2023    MCV 84 01/06/2023     (H) 01/06/2023     Lab Results   Component Value Date    SODIUM 135 01/06/2023    K 3 7 01/06/2023     01/06/2023    CO2 20 (L) 01/06/2023    BUN 20 01/06/2023    CREATININE 1 13 01/06/2023    GLUC 123 01/06/2023    CALCIUM 8 7 01/06/2023     Lab Results   Component Value Date    INR 1 12 12/19/2022    INR 1 10 11/12/2022    INR 1 24 (H) 11/09/2022    PROTIME 14 7 (H) 12/19/2022    PROTIME 14 4 11/12/2022    PROTIME 15 8 (H) 11/09/2022       Current Facility-Administered Medications:   •  acetaminophen (TYLENOL) tablet 975 mg, 975 mg, Oral, Q8H [address], GAURANG Keith, 975 mg at 01/06/23 6310  •  amLODIPine (NORVASC) tablet 5 mg, 5 mg, Oral, BID, GAURANG Keith, 5 mg at 01/06/23 1037  •  aspirin chewable tablet 81 mg, 81 mg, Oral, Daily, GAURANG Keith, 81 mg at 01/06/23 1037  •  atorvastatin (LIPITOR) tablet 40 mg, 40 mg, Oral, Daily, GAURANG Keith, 40 mg at 01/06/23 1037  •  calcium carbonate (TUMS) chewable tablet 500 mg, 500 mg, Oral, BID PRN, GAURANG Keith, 500 mg at 01/03/23 1406  •  cholecalciferol (VITAMIN D3) tablet 400 Units, 400 Units, Oral, Daily, GAURANG Keith, 400 Units at 01/06/23 1038  •  collagenase (SANTYL) ointment, , Topical, Daily, Jeana Garnett MD, 1 application at 61/11/57 0825  •  ertapenem (INVanz) 1,000 mg in sodium chloride 0 9 % 50 mL IVPB, 1,000 mg, Intravenous, Q24H, Amy Alvarado MD, Last Rate: 100 mL/hr at 01/05/23 2142, 1,000 mg at 01/05/23 2142  •  heparin (porcine) subcutaneous injection 5,000 Units, 5,000 Units, Subcutaneous, Q8H [address], GAURANG Keith, 5,000 Units at 01/06/23 4993  •  insulin glargine (LANTUS) subcutaneous injection 10 Units 0 1 mL, 10 Units, Subcutaneous, HS, GAURANG Arias, 10 Units at 01/05/23 2143  •  insulin lispro (HumaLOG) 100 units/mL subcutaneous injection 1-5 Units, 1-5 Units, Subcutaneous, HS, GAURANG Keith, 1 Units at 01/04/23 2149  •  insulin lispro (HumaLOG) 100 units/mL subcutaneous injection 1-6 Units, 1-6 Units, Subcutaneous, TID AC, 1 Units at 01/05/23 1743 **AND** Fingerstick Glucose (POCT), , , TID AC, GAURANG Keith  •  insulin lispro (HumaLOG) 100 units/mL subcutaneous injection 6 Units, 6 Units, Subcutaneous, TID With Meals, GAURANG Simpson, 6 Units at 01/06/23 0931  •  iohexol (OMNIPAQUE) 350 MG/ML injection (SINGLE-DOSE) 50 mL, 50 mL, Intravenous, Once in imaging, Louie Martinez MD  •  melatonin tablet 3 mg, 3 mg, Oral, HS, GAURANG Keith, 3 mg at 01/05/23 1936  •  methocarbamol (ROBAXIN) tablet 500 mg, 500 mg, Oral, BID, GAURANG Keith, 500 mg at 01/06/23 1037  •  multivitamin-minerals (CENTRUM) tablet 1 tablet, 1 tablet, Oral, Daily, GAURANG Keith, 1 tablet at 01/06/23 1038  •  ondansetron (ZOFRAN-ODT) dispersible tablet 4 mg, 4 mg, Oral, Q6H PRN, GAURANG Keith, 4 mg at 01/03/23 2107  •  ondansetron (ZOFRAN-ODT) dispersible tablet 4 mg, 4 mg, Oral, BID, GAURANG Hanna, 4 mg at 01/06/23 3002  •  oxyCODONE (ROXICODONE) immediate release tablet 10 mg, 10 mg, Oral, Q6H PRN, GAURANG Keith, 10 mg at 01/05/23 2144  •  oxyCODONE (ROXICODONE) IR tablet 2 5 mg, 2 5 mg, Oral, Q3H PRN, GAURANG Keith, 2 5 mg at 12/25/22 1911  •  oxyCODONE (ROXICODONE) IR tablet 5 mg, 5 mg, Oral, Q6H PRN, GAURANG Keith, 5 mg at 01/03/23 2131  •  pantoprazole (PROTONIX) EC tablet 40 mg, 40 mg, Oral, BID AC, GAURANG Keith, 40 mg at 01/06/23 9065  •  simethicone (MYLICON) chewable tablet 80 mg, 80 mg, Oral, 4x Daily (with meals and at bedtime), GAURANG Keith, 80 mg at 01/06/23 1037  •  tamsulosin (FLOMAX) capsule 0 4 mg, 0 4 mg, Oral, Daily With Dinner, GAURANG Burton, 0 4 mg at 01/05/23 1741    Past Medical History:   Diagnosis Date   • Abdominal pain 03/12/2022   • Acute renal failure ([facility] )     [id number] resolved   • Cancer ([facility] )    • Diabetes mellitus ([facility] )    • Enteritis 08/23/2016   • Gastroparesis due to DM ([place] [address] ) 08/23/2016   • GERD (gastroesophageal reflux disease)    • Hernia, ventral 08/04/2016   • Hyperlipidemia    • Hypertension    • Morbid obesity ([place] [address] ) 04/17/2018   • Postoperative visit 03/02/2022   • SIRS (systemic inflammatory response syndrome) ([place] [address] ) 03/12/2022   • Snoring    • Stage 3a chronic kidney disease ([place] [address] ) 02/19/2022       Patient Active Problem List    Diagnosis Date Noted   • 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 liver ([place] [address] ) 03/02/2022   • Malignant neoplasm of transverse colon ([place] [address] ) 03/01/2022   • Dehiscence of incision 12/30/2022   • Elevated alkaline phosphatase level 12/30/2022   • Leukocytosis 12/29/2022   • Abscess 12/27/2022   • Acute pain 12/27/2022   • Sepsis ([facility] ) 12/17/2022   • Severe protein-calorie malnutrition ([facility] ) 12/14/2022   • Acute on chronic kidney failure ([facility] ) 12/14/2022   • Flash pulmonary edema ([facility] ) 11/12/2022   • MR (mitral regurgitation) 11/12/2022   • Bacteremia 11/12/2022   • ESBL (extended spectrum beta-lactamase) producing bacteria infection 11/12/2022   • Acute respiratory failure with hypoxia ([facility] ) 11/12/2022   • Encephalopathy 11/09/2022   • Cervical radiculopathy 10/26/2022   • Other fatigue 06/15/2022   • Colostomy prolapse ([facility] ) 05/27/2022   • Colon cancer metastasized to liver ([address] ) 03/12/2022   • Iron deficiency anemia, unspecified 03/01/2022   • Thrombocytosis 02/21/2022   • Hypokalemia 02/19/2022   • Transaminitis 02/19/2022   • Type 2 diabetes mellitus with hyperlipidemia ([facility] ) 02/18/2022   • Colonic mass 02/18/2022   • Microcytic anemia 02/18/2022   • Left ureteral calculus 01/30/2020   • Incarcerated umbilical hernia 65/25/9586   • Testicular hypogonadism 06/19/2017   • Low testosterone 05/30/2017   • Type 2 diabetes mellitus without complication, with long-term current use of insulin ([address] ) 08/23/2016   • Benign essential hypertension 08/23/2016   • Mixed hyperlipidemia 08/23/2016   • Erectile dysfunction 07/11/2016   • Obesity (BMI 30-39 9) 07/11/2016      GAURANG Bassett  Physical Medicine and Chris Luu    Total visit time: 35 minutes, with more than 50% spent counseling/coordinating care  Counseling includes discussion with patient re: progress in therapies, functional issues observed by therapy staff, and discussion with patient regarding their current medical state and wellbeing  Coordination of patient's care was performed in conjunction with Internal Medicine service to monitor patient's labs, vitals, and management of their comorbidities

## 2023-01-06 NOTE — PROGRESS NOTES
[facility] Occupational Therapy Daily Note  Patient Active Problem List   Diagnosis    Type 2 diabetes mellitus without complication, with long-term current use of insulin ([facility] )    Benign essential hypertension    Mixed hyperlipidemia    Erectile dysfunction    Low testosterone    Obesity (BMI 30-39  9)    Testicular hypogonadism    Incarcerated umbilical hernia    Left ureteral calculus    Type 2 diabetes mellitus with hyperlipidemia (HCC)    Colonic mass    Microcytic anemia    Hypokalemia    Transaminitis    Thrombocytosis    Iron deficiency anemia, unspecified    Malignant neoplasm of transverse colon (HCC)    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)    Colon cancer metastasized to liver (HCC)    Colostomy prolapse (HCC)    Other fatigue    Cervical radiculopathy    Encephalopathy    Flash pulmonary edema (HCC)    MR (mitral regurgitation)    Bacteremia    ESBL (extended spectrum beta-lactamase) producing bacteria infection    Acute respiratory failure with hypoxia (HCC)    Severe protein-calorie malnutrition (HCC)    Acute on chronic kidney failure (HCC)    Sepsis (HCC)    Abscess    Acute pain    Leukocytosis    Dehiscence of incision    Elevated alkaline phosphatase level       Past Medical History:   Diagnosis Date    Abdominal pain 03/12/2022    Acute renal failure ([facility] )     [id number] resolved    Cancer ([place] [address] )     Diabetes mellitus ([facility] )     Enteritis 08/23/2016    Gastroparesis due to DM ([facility] ) 08/23/2016    GERD (gastroesophageal reflux disease)     Hernia, ventral 08/04/2016    Hyperlipidemia     Hypertension     Morbid obesity ([facility] ) 04/17/2018    Postoperative visit 03/02/2022    SIRS (systemic inflammatory response syndrome) ([facility] ) 03/12/2022    Snoring     Stage 3a chronic kidney disease ([facility] ) 02/19/2022     Etiologic Diagnosis: Critical illness myopathy  Restrictions/Precautions  Precautions: Fall Risk, Contact/isolation, Multiple lines (VICTORINO drain, ileostomy, IR drain)  Weight Bearing Restrictions: No  ROM Restrictions: No  ADL Team Goal: Patient will require assist with ADLs with least restrictive device upon completion of rehab program  Occupational Therapy LTG's  Eating Oral care Bathing LB dress UB dress   Eating Goal: 06  Independent - Patient completes the activity by him/herself with no assistance from a helper  Oral Hygiene Goal: 06  Independent - Patient completes the activity by him/herself with no assistance from a helper  Shower/bathe self Goal: 04  Supervision or touching assistance- [place] provides VERBAL CUES or supervision throughout activity  Lower body dressing Goal: 04  Supervision or touching assistance- [place] provides VERBAL CUES or supervision throughout activity  Upper body dressing Goal: 04  Supervision or touching assistance- [place] provides VERBAL CUES or supervision throughout activity  Toileting Toilet txf Func txf IADL Med    Toileting hygiene Goal: 04  Supervision or touching assistance- [place] provides VERBAL CUES or supervision throughout activity  Toilet transfer Goal: 04  Supervision or touching assistance- [place] provides VERBAL CUES or supervision throughout activity  Assist Level: Supervision   OT interventions: Treatment/Interventions: ADL retraining, Functional transfer training, Therapeutic exercise, Endurance training, Patient/family training, Equipment eval/education, Bed mobility, Compensatory technique education  Discharge Plan:  OT Discharge Recommendation: Home with home health rehabilitation   DME: Equipment Recommended:  (RW, WC, SHower chair/tub bench(if applicable)),  ,       65/18/06 0900   Pain Assessment   Pain Assessment Tool 0-10   Pain Score No Pain   Lifestyle   Autonomy "no colon cancer getting Pumba down "   Eating   Type of Assistance Needed Independent   Comment pt reporting being able to eat his breakfast today     Eating CARE Score 6   Oral Hygiene   Type of Assistance Needed Independent   Comment seated in w/c at sink   Oral Hygiene CARE Score 6   Shower/Bathe Self   Type of Assistance Needed Supervision   Comment Pt demonstrates ability to complete distal LE bathing via cross leg technique   Shower/Bathe Self CARE Score 4   Upper Body Dressing   Type of Assistance Needed Supervision   Upper Body Dressing CARE Score 4   Lower Body Dressing   Type of Assistance Needed Incidental touching   Lower Body Dressing CARE Score 4   Lying to Sitting on Side of Bed   Type of Assistance Needed Supervision   Lying to Sitting on Side of Bed CARE Score 4   Sit to Stand   Type of Assistance Needed Supervision   Sit to Stand CARE Score 4   Bed-Chair Transfer   Type of Assistance Needed Supervision   Chair/Bed-to-Chair Transfer CARE Score 4   Toileting Hygiene   Type of Assistance Needed Set-up / clean-up   Comment pt complete ileostomy care sitting EOB  req set up of [address] [facility] Score 5   Cognition   Overall Cognitive Status WFL   Activity Tolerance   Activity Tolerance Patient tolerated treatment well   Assessment   Treatment Assessment Pt participated in skilled OT session focusing on functional ADL retraining, activity tolerance, activity modification, use of AE, and functional transfers  See above for details on ADL function  Pt continues to demonstrate ability to complete tasks pending on fatigue  Pt slightly upset after phone call about limited [facility] services in his area, pt feeling defeated  Pt did require increased assist 2* increased pain/soreness in neck  Pt did report he was able to sleep well after taking pain medications  Pt continues to require skilled acute rehab OT services to increase overall functional independence and safety w/ I/ADLs and functional transfers  Continue with plan for discharge as home OT upon return home with increased family support   Continued plan of care for OT sessions to focus on the following areas:  ADL Retraining , LB Dressing, UB dressing,  LHAE education/training, Functional Transfers, Standing tolerance, Standing balance , Fine motor strengthening , Gross motor strengthening , DME training/education, Family training/education, Energy conservation training/education, healthy coping education, Leisure and social pursuits and Core/trunk control/strengthening      OT Therapy Minutes   OT Time In 0900   OT Time Out 1040   OT Total Time (minutes) 100   OT Mode of treatment - Individual (minutes) 100   OT Mode of treatment - Concurrent (minutes) 0   OT Mode of treatment - Group (minutes) 0   OT Mode of treatment - Co-treat (minutes) 0   OT Mode of Treatment - Total time(minutes) 100 minutes   OT Cumulative Minutes 8214

## 2023-01-06 NOTE — QUICK NOTE
Lispro with meals held lunch 1/5 and today lunch and dinner since did not eat    Continue to hold Lispro if pt does not eat his and will consider stopping it Monday and adding Metformin

## 2023-01-06 NOTE — CASE MANAGEMENT
Caregivers [facility] and [facility] were unable to accept the patient due to availability  Additional referrals sent to [facility] and [address]  I also notified agencies that if the patient was able to be accepted with a delayed start of care we would hold his DC to prevent a lapse in services  I reviewed this with the patient as well as the option to send a referral to [facility] [place], however due to a poor prior experience he was adamant against it  Will not send to [facility]  Update 1004: [facility] declined service- not in network with patient's insurance  Update 1306: All referrals declined care either due to being out of network or not having availability  6 additional referrals sent  [address] has communicated that the equipment ordered was unavailable (gel overlay and lightweight wheelchair) and may not be able to get for 30 days

## 2023-01-06 NOTE — PROGRESS NOTES
Progress Note - Surgical Oncology   [place] Zeb 61 y o  male MRN: [id number]  Unit/Bed#: -60 Encounter: [id number]  01/06/23  8:51 AM      Subjective/Objective   No chief complaint on file  Subjective: No complaints except for some nausea    Objective: Drain check results noted    Blood pressure 131/80, pulse 78, temperature 98 °F (36 7 °C), temperature source Oral, resp  rate 16, height 5' 10" (1 778 m), weight 104 kg (230 lb), SpO2 99 %  ,Body mass index is 33 kg/m²  Intake/Output Summary (Last 24 hours) at 1/6/2023 0851  Last data filed at 1/6/2023 0641  Gross per 24 hour   Intake 120 ml   Output 1297 ml   Net -1177 ml       Invasive Devices     Central Venous Catheter Line  Duration           Port A Cath 03/10/22 Right Chest 301 days          Drain  Duration           Ileostomy LUQ 49 days    Abscess Drain Abdomen 28 days    Cholecystostomy Tube 24 days    Abscess Drain Abdomen 16 days    Abscess Drain Back 16 days                Physical Exam:   Head and neck: is normocephalic  Neck is supple without adenopathy  Sclerae are anicteric  Mucous membranes are moist    Abdomen: Soft, nontender, nondistended, and without masses  Drains in place  Purulent from the left side  Incision with yellow exudate at the base    Extremities: Without cyanosis, clubbing, or edema, symmetric  Neuro: Grossly nonfocal  Skin is warm and anicteric  Psych: Patient is pleasant and talkative              Lab Results:  Lab Results   Component Value Date    WBC 12 41 (H) 01/06/2023    HGB 8 2 (L) 01/06/2023    HCT 26 1 (L) 01/06/2023    MCV 84 01/06/2023     (H) 01/06/2023     Lab Results   Component Value Date    GLUCOSE 151 (H) 11/16/2022    CALCIUM 8 7 01/06/2023     05/02/2017    K 3 7 01/06/2023    CO2 20 (L) 01/06/2023     01/06/2023    BUN 20 01/06/2023    CREATININE 1 13 01/06/2023     No results found for: AFP  Lab Results   Component Value Date    CALCIUM 8 7 01/06/2023    PHOS 3 9 12/11/2022     Lab Results   Component Value Date    CEA 69 2 (H) 08/05/2022           Assessment:  50-year-old male status post segment 3 liver resection with transverse colectomy followed by right colectomy for a leak and drainage of multiple abscesses/hematoma    Plan:  Diet as tolerated  Continue local wound care  Continue all drains as per IR  Antibiotics as per ID  All other care as per primary service    Santiago Field MD  8:51 AM

## 2023-01-06 NOTE — PLAN OF CARE
Problem: Prexisting or High Potential for Compromised Skin Integrity  Goal: Skin integrity is maintained or improved  Description: INTERVENTIONS:  - Identify patients at risk for skin breakdown  - Assess and monitor skin integrity  - Assess and monitor nutrition and hydration status  - Monitor labs   - Assess for incontinence   - Turn and reposition patient  - Assist with mobility/ambulation  - Relieve pressure over bony prominences  - Avoid friction and shearing  - Provide appropriate hygiene as needed including keeping skin clean and dry  - Evaluate need for skin moisturizer/barrier cream  - Collaborate with interdisciplinary team   - Patient/family teaching  - Consider wound care consult   Outcome: Progressing     Problem: Potential for Falls  Goal: Patient will remain free of falls  Description: INTERVENTIONS:  - Educate patient/family on patient safety including physical limitations  - Instruct patient to call for assistance with activity   - Consult OT/PT to assist with strengthening/mobility   - Keep Call bell within reach  - Keep bed low and locked with side rails adjusted as appropriate  - Keep care items and personal belongings within reach  - Initiate and maintain comfort rounds  - Make Fall Risk Sign visible to staff  - Offer Toileting every 2 Hours, in advance of need  - Initiate/Maintain bed alarm  - Obtain necessary fall risk management equipment: bed alarm  - Apply yellow socks and bracelet for high fall risk patients  - Consider moving patient to room near nurses station  Outcome: Progressing     Problem: PAIN - ADULT  Goal: Verbalizes/displays adequate comfort level or baseline comfort level  Description: Interventions:  - Encourage patient to monitor pain and request assistance  - Assess pain using appropriate pain scale  - Administer analgesics based on type and severity of pain and evaluate response  - Implement non-pharmacological measures as appropriate and evaluate response  - Consider cultural and social influences on pain and pain management  - Notify physician/advanced practitioner if interventions unsuccessful or patient reports new pain  Outcome: Progressing     Problem: INFECTION - ADULT  Goal: Absence or prevention of progression during hospitalization  Description: INTERVENTIONS:  - Assess and monitor for signs and symptoms of infection  - Monitor lab/diagnostic results  - Monitor all insertion sites, i e  indwelling lines, tubes, and drains  - Monitor endotracheal if appropriate and nasal secretions for changes in amount and color  - [place] appropriate cooling/warming therapies per order  - Administer medications as ordered  - Instruct and encourage patient and family to use good hand hygiene technique  - Identify and instruct in appropriate isolation precautions for identified infection/condition  Outcome: Progressing     Problem: SAFETY ADULT  Goal: Patient will remain free of falls  Description: INTERVENTIONS:  - Educate patient/family on patient safety including physical limitations  - Instruct patient to call for assistance with activity   - Consult OT/PT to assist with strengthening/mobility   - Keep Call bell within reach  - Keep bed low and locked with side rails adjusted as appropriate  - Keep care items and personal belongings within reach  - Initiate and maintain comfort rounds  - Make Fall Risk Sign visible to staff  - Offer Toileting every 2 Hours, in advance of need  - Initiate/Maintain bed alarm  - Obtain necessary fall risk management equipment: bed alarm  - Apply yellow socks and bracelet for high fall risk patients  - Consider moving patient to room near nurses station  Outcome: Progressing  Goal: Maintain or return to baseline ADL function  Description: INTERVENTIONS:  -  Assess patient's ability to carry out ADLs; assess patient's baseline for ADL function and identify physical deficits which impact ability to perform ADLs (bathing, care of mouth/teeth, toileting, grooming, dressing, etc )  - Assess/evaluate cause of self-care deficits   - Assess range of motion  - Assess patient's mobility; develop plan if impaired  - Assess patient's need for assistive devices and provide as appropriate  - Encourage maximum independence but intervene and supervise when necessary  - Involve family in performance of ADLs  - Assess for home care needs following discharge   - Consider OT consult to assist with ADL evaluation and planning for discharge  - Provide patient education as appropriate  Outcome: Progressing  Goal: Maintains/Returns to pre admission functional level  Description: INTERVENTIONS:  - Perform BMAT or MOVE assessment daily    - Set and communicate daily mobility goal to care team and patient/family/caregiver  - Collaborate with rehabilitation services on mobility goals if consulted  - Perform Range of Motion 3 times a day  - Reposition patient every 2 hours    - Dangle patient 3 times a day  - Stand patient 3 times a day  - Ambulate patient 3 times a day  - Out of bed to chair 3 times a day   - Out of bed for meals 3 times a day  - Out of bed for toileting  - Record patient progress and toleration of activity level   Outcome: Progressing     Problem: DISCHARGE PLANNING  Goal: Discharge to home or other facility with appropriate resources  Description: INTERVENTIONS:  - Identify barriers to discharge w/patient and caregiver  - Arrange for needed discharge resources and transportation as appropriate  - Identify discharge learning needs (meds, wound care, etc )  - Arrange for interpretive services to assist at discharge as needed  - Refer to Case Management Department for coordinating discharge planning if the patient needs post-hospital services based on physician/advanced practitioner order or complex needs related to functional status, cognitive ability, or social support system  Outcome: Progressing     Problem: Nutrition/Hydration-ADULT  Goal: Nutrient/Hydration intake appropriate for improving, restoring or maintaining nutritional needs  Description: Monitor and assess patient's nutrition/hydration status for malnutrition  Collaborate with interdisciplinary team and initiate plan and interventions as ordered  Monitor patient's weight and dietary intake as ordered or per policy  Utilize nutrition screening tool and intervene as necessary  Determine patient's food preferences and provide high-protein, high-caloric foods as appropriate       INTERVENTIONS:  - Monitor oral intake, urinary output, labs, and treatment plans  - Assess nutrition and hydration status and recommend course of action  - Evaluate amount of meals eaten  - Assist patient with eating if necessary   - Allow adequate time for meals  - Recommend/ encourage appropriate diets, oral nutritional supplements, and vitamin/mineral supplements  - Order, calculate, and assess calorie counts as needed  - Recommend, monitor, and adjust tube feedings and TPN/PPN based on assessed needs  - Assess need for intravenous fluids  - Provide specific nutrition/hydration education as appropriate  - Include patient/family/caregiver in decisions related to nutrition  Outcome: Progressing     Problem: MOBILITY - ADULT  Goal: Maintain or return to baseline ADL function  Description: INTERVENTIONS:  -  Assess patient's ability to carry out ADLs; assess patient's baseline for ADL function and identify physical deficits which impact ability to perform ADLs (bathing, care of mouth/teeth, toileting, grooming, dressing, etc )  - Assess/evaluate cause of self-care deficits   - Assess range of motion  - Assess patient's mobility; develop plan if impaired  - Assess patient's need for assistive devices and provide as appropriate  - Encourage maximum independence but intervene and supervise when necessary  - Involve family in performance of ADLs  - Assess for home care needs following discharge   - Consider OT consult to assist with ADL evaluation and planning for discharge  - Provide patient education as appropriate  Outcome: Progressing  Goal: Maintains/Returns to pre admission functional level  Description: INTERVENTIONS:  - Perform BMAT or MOVE assessment daily    - Set and communicate daily mobility goal to care team and patient/family/caregiver  - Collaborate with rehabilitation services on mobility goals if consulted  - Perform Range of Motion 3 times a day  - Reposition patient every 2 hours    - Dangle patient 3 times a day  - Stand patient 3 times a day  - Ambulate patient 3 times a day  - Out of bed to chair 3 times a day   - Out of bed for meals 3 times a day  - Out of bed for toileting  - Record patient progress and toleration of activity level   Outcome: Progressing

## 2023-01-06 NOTE — PROGRESS NOTES
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  • s/p hepatic resection and reversal of colostomy on 11/7  • s/p right hemicolectomy with partial descending colectomy colocolonic anastomosis with loop ileostomy and mid line abdominal VAC placement 11/16  • Continue local wound care; WC following  • CT  Abd/pelvis 12/17 = loculated collection along splenic recess   Has perisplenic hilum collection as well --> to IR 12/20 for drain placement in both perigastric and perisplenic area and cx sent from both areas = Ecoli  • Flush drains with 10ml qd; drained 15/20/17 on 1/5  • S-O following/Dr Gurjit Cannon was in to see pt 1/2/23  • Tube check (incl perc alvina tube) done 1/3 = unchanged with still some fluid around each drain so kept in  Next tube check 2 weeks @1/17  • Bloody drainage in VICTORINO #3 and IR in to see pt and felt 2/2 tube manipulation when he went for his tube check     Acute cholecystitis  • s/p percutaneous tube placement 12/8 with tube check on 12/12, 12/14  • GI saw due to alk phos elevation = felt 2/2 infiltrative disease rather than focal biliary obstruction   AMA was negative     • Alkaline phosphatase isoenzyme sent 12/17 (48% liver/52% bone) = GI  recommended continuing to monitor his LFTs and defer to surgical oncology for further w/u and plan   Last 3 APs were [phone]  Anusha Rideau recheck 1/3    • TB improving = to consider MRCP if total bili trends back up but was normal 12/30  • Alvina tube = 10 ml 1/5/23     • Drainage still has dark blood in it but seems to slowly becoming less     • Per GI, do cholangiogram with next tube check --> wasn't done 1/3 and GI aware  • GI wrote note 1/4 = aware labs incl GGT of 1992 (previously 5730 on 12/17)     Bacteremia/abdominal abscess  • ID following   • s/p Ertapenem was switched to Doxycycline per ID as of 12/30  • Blood cultures negative  • CT abdomen and pelvis as above  • + nausea since starting Doxy on 12/30   Lasted 1-2 hrs and was affecting appetite pt said   • Changed back to Ertapenem 1/3/23 since was having nausea with the Doxy     Hypertension  • On Norvasc 5mg BID  • Usually gets doses but held AM 1/4 for SBP 1/4     Diabetes mellitus  • Home:  Glargine 35U qam/Glyburide-Metformin 56110 qam/Januvia 100mg qam  • Here:  Lantus 10U qhs/Lispro 6U TID  • Has a DEXCOM meter at home  • Continue DM diet and QID Accuchecks/SSI  • Eating/meal completion is erratic and therefore BSs difficult  • May consider adding back some Metformin this weekend     Acute on chronic renal failure  • Stage III; baseline 1 2-1 4  • Lisinopril currently on hold  • s/p NSS IVF with improvement of creat =  back down to 1 2 12/30  • Stable at 1 3   • CMP 1/6     Anemia  • Multifactorial due to recent infection, surgery, CKD stage III  • Transfused on 12/15, 12/16/22 and 12/30  • Hemoglobin 1/2/23 stable at 8 1     Atypical chest pain  • With elevation in troponin/EKG changes on acute side  • Cardiology saw then and cleared pt for discharge  • Did not recommend any further work up  • No further chest pain     Situational depression  • Neuropsychology consulted  • Patient not interested in medications at this point in time     Malnutrition  • Glucerna makes him nauseated  • On 12/15, ordered double protein     Pleural effusion  • CXR on 12/14 showed small left pleural effusion and was suspect for CHF  • ECHO 11/9/22 = LVEF 55%, unable to assess diastolic function, mild TR     L sided pleuritic pain  • located near drain site and per patient only occurs at night  • No evidence of infection        Discharge date:  1/9/23          The above assessment and plan was reviewed and updated as determined by my evaluation of the patient on 1/6/2023      Labs: Results from last 7 days   Lab Units 01/06/23  0618 01/02/23  0602   WBC Thousand/uL 12 41* 12 67*   HEMOGLOBIN g/dL 8 2* 8 1*   HEMATOCRIT % 26 1* 26 9*   PLATELETS Thousands/uL 480* 620*     Results from last 7 days   Lab Units 01/06/23  0618 01/03/23  0635   SODIUM mmol/L 135 136   POTASSIUM mmol/L 3 7 4 1   CHLORIDE mmol/L 107 108   CO2 mmol/L 20* 19*   BUN mg/dL 20 20   CREATININE mg/dL 1 13 1 30   CALCIUM mg/dL 8 7 8 5             Results from last 7 days   Lab Units 01/06/23  0616 01/05/23  2022 01/05/23  1551   POC GLUCOSE mg/dl 121 149* 160*       Review of Scheduled Meds:  Current Facility-Administered Medications   Medication Dose Route Frequency Provider Last Rate   • acetaminophen  975 mg Oral Q8H [address] GAURANG Keith     • amLODIPine  5 mg Oral BID GAURANG Keith     • aspirin  81 mg Oral Daily GAURANG Keith     • atorvastatin  40 mg Oral Daily GAURANG Keith     • calcium carbonate  500 mg Oral BID PRN GAURANG Coello     • cholecalciferol  400 Units Oral Daily GAURANG Keith     • collagenase   Topical Daily Mariela Powell MD     • ertapenem  1,000 mg Intravenous Q24H Maryam Barajas MD 1,000 mg (01/05/23 2142)   • heparin (porcine)  5,000 Units Subcutaneous Q8H [address] GAURANG Keith     • insulin glargine  10 Units Subcutaneous HS GAURANG Mcfarland     • insulin lispro  1-5 Units Subcutaneous HS GAURANG eKith     • insulin lispro  1-6 Units Subcutaneous TID AC GAURANG Keith     • insulin lispro  6 Units Subcutaneous TID With Meals GAURANG Mcfarland     • iohexol  50 mL Intravenous Once in imaging Royce Guerra MD     • melatonin  3 mg Oral HS GAURANG Keith     • methocarbamol  500 mg Oral BID GAURANG Coello     • multivitamin-minerals  1 tablet Oral Daily GAURANG Keith     • ondansetron  4 mg Oral Q6H PRN GAURANG Keith     • ondansetron  4 mg Oral BID GAURANG Mosley     • oxyCODONE  10 mg Oral Q6H PRN Dago Frederick, RANNP     • oxyCODONE  2 5 mg Oral Q3H PRN Dago Frederick, CRNP     • oxyCODONE  5 mg Oral Q6H PRN GAURANG Keith     • pantoprazole  40 mg Oral BID AC GAURANG Keith     • simethicone  80 mg Oral 4x Daily (with meals and at bedtime) Dago Frederick, CRNP     • tamsulosin  0 4 mg Oral Daily With Dinner Dago FrederickGAURANG bowling         Subjective/ HPI: Patient seen and examined  Patients overnight issues or events were reviewed with nursing or staff during rounds or morning huddle session  New or overnight issues include the following:     No new or overnight issues  Ate well for breakfast, then had OT  Thereafter c/o nausea, bloating and weakness  VS/BS were normal   He had refused the Zofran and Simethicone but agreeable now  ROS:   A 10 point ROS was performed; negative except as noted above  Imaging:     IR drainage tube check and/or removal   Final Result by Bayron Hurst MD (01/04 1631)   Impression:   1  Drain check demonstrated all 3 drains to be patent  No fistula to the bowel  There are still small collections around each drain and therefore the drains were left in place  PLAN: Tubes to bag drainage  Return in 2 weeks for tube checks  Workstation performed: [id number]         IR drainage tube placement   Final Result by Freda Holder MD (12/20 2003)      Percutaneous placement of abscess drainage catheters into the perigastric and perisplenic abscesses using 10-Tajik catheters  Plan:       - Catheters to bulb suction drainage    - Flush catheters with 10 mL normal saline daily    - Return in 2 weeks for drain check  Workstation performed: [id number]         CT abdomen pelvis w contrast   Final Result by Colette Medina MD (64/82 3657)      Status post cholecystostomy tube placement with decompression of the gallbladder    In addition, a catheter placed adjacent to the cut edge of the liver is increased position with near complete resolution of fluid collection  Loculated collection along the splenic recess of the lesser sac is unchanged in size  Additional perisplenic collection in the hilum and along the inferior pole are unchanged  Collection of fluid and gas in the left upper quadrant adjacent to left kidney also not significantly changed  No extravasated oral contrast             Workstation performed: [id number]         IR drainage tube check/change/reposition/reinsertion/upsize    (Results Pending)   IR cholecystostomy tube check and/or removal    (Results Pending)       *Labs /Radiology studies reviewed  *Medications reviewed and reconciled as needed  *Please refer to order section for additional ordered labs studies  *Case discussed with primary attending during morning huddle case rounds    Physical Examination:  Vitals:   Vitals:    01/05/23 1410 01/05/23 1741 01/05/23 2101 01/06/23 0644   BP: 150/87 126/86 136/79 131/80   BP Location: Right arm  Left arm Left arm   Pulse: 91  93 78   Resp: 16  17 16   Temp: 98 °F (36 7 °C)  98 6 °F (37 °C) 98 °F (36 7 °C)   TempSrc: Oral  Oral Oral   SpO2: 98%  98% 99%   Weight:       Height:           General Appearance: no distress, conversive  HEENT:  External ear normal   Nose normal w/o drainage  Mucous membranes are moist  Oropharynx is clear  Conjunctiva clear w/o icterus or redness  Neck:  Supple, normal ROM  Lungs: BBS without crackles/wheeze/rhonchi; respirations unlabored with normal inspiratory/expiratory effort  No retractions noted  On RA  CV: regular rate and rhythm; no rubs/murmurs/gallops, PMI normal   ABD: Abdomen is soft  Bowel sounds all quadrants  Nontender with no distention  Drain labeled "back" is still with small amt bloody drained and the other 2 with tan purulent    Perc radha tube with dark bloody drainage  EXT: no edema  Skin: normal turgor, normal texture, no rashes  Psych: affect normal, mood normal  Neuro: AAO      The above physical exam was reviewed and updated as determined by my evaluation of the patient on 1/6/2023  Invasive Devices     Central Venous Catheter Line  Duration           Port A Cath 03/10/22 Right Chest 301 days          Drain  Duration           Ileostomy LUQ 49 days    Abscess Drain Abdomen 28 days    Cholecystostomy Tube 24 days    Abscess Drain Abdomen 16 days    Abscess Drain Back 16 days                   VTE Pharmacologic Prophylaxis: Heparin  Code Status: Level 1 - Full Code  Current Length of Stay: 23 day(s)      Total time spent:  30 minutes with more than 50% spent counseling/coordinating care  Counseling includes discussion with patient re: progress  and discussion with patient of his/her current medical state/information  Coordination of patient's care was performed in conjunction with primary service  Time invested included review of patient's labs, vitals, and management of their comorbidities with continued monitoring  In addition, this patient was discussed with medical team including physician and advanced extenders  The care of the patient was extensively discussed and appropriate treatment plan was formulated unique for this patient  Medical decision making for the day was made by supervising physician unless otherwise noted in their attestation statement  ** Please Note:  voice to text software may have been used in the creation of this document   Although proof errors in transcription or interpretation are a potential of such software**

## 2023-01-07 LAB
GLUCOSE SERPL-MCNC: 104 MG/DL (ref 65–140)
GLUCOSE SERPL-MCNC: 106 MG/DL (ref 65–140)
GLUCOSE SERPL-MCNC: 175 MG/DL (ref 65–140)
GLUCOSE SERPL-MCNC: 98 MG/DL (ref 65–140)

## 2023-01-07 RX ADMIN — PANTOPRAZOLE SODIUM 40 MG: 40 TABLET, DELAYED RELEASE ORAL at 06:37

## 2023-01-07 RX ADMIN — ERTAPENEM SODIUM 1000 MG: 1 INJECTION, POWDER, LYOPHILIZED, FOR SOLUTION INTRAMUSCULAR; INTRAVENOUS at 21:41

## 2023-01-07 RX ADMIN — INSULIN GLARGINE 10 UNITS: 100 INJECTION, SOLUTION SUBCUTANEOUS at 21:41

## 2023-01-07 RX ADMIN — ACETAMINOPHEN 975 MG: 325 TABLET, FILM COATED ORAL at 06:37

## 2023-01-07 RX ADMIN — ACETAMINOPHEN 975 MG: 325 TABLET, FILM COATED ORAL at 13:10

## 2023-01-07 RX ADMIN — AMLODIPINE BESYLATE 5 MG: 5 TABLET ORAL at 10:12

## 2023-01-07 RX ADMIN — COLLAGENASE SANTYL: 250 OINTMENT TOPICAL at 10:14

## 2023-01-07 RX ADMIN — ONDANSETRON 4 MG: 4 TABLET, ORALLY DISINTEGRATING ORAL at 20:00

## 2023-01-07 RX ADMIN — CHOLECALCIFEROL TAB 10 MCG (400 UNIT) 400 UNITS: 10 TAB at 10:13

## 2023-01-07 RX ADMIN — OXYCODONE HYDROCHLORIDE 5 MG: 5 TABLET ORAL at 16:05

## 2023-01-07 RX ADMIN — METHOCARBAMOL 500 MG: 500 TABLET ORAL at 10:11

## 2023-01-07 RX ADMIN — INSULIN LISPRO 1 UNITS: 100 INJECTION, SOLUTION INTRAVENOUS; SUBCUTANEOUS at 13:11

## 2023-01-07 RX ADMIN — HEPARIN SODIUM 5000 UNITS: 5000 INJECTION INTRAVENOUS; SUBCUTANEOUS at 06:37

## 2023-01-07 RX ADMIN — HEPARIN SODIUM 5000 UNITS: 5000 INJECTION INTRAVENOUS; SUBCUTANEOUS at 21:41

## 2023-01-07 RX ADMIN — SIMETHICONE 80 MG: 80 TABLET, CHEWABLE ORAL at 10:12

## 2023-01-07 RX ADMIN — OXYCODONE HYDROCHLORIDE 5 MG: 5 TABLET ORAL at 02:46

## 2023-01-07 RX ADMIN — INSULIN LISPRO 6 UNITS: 100 INJECTION, SOLUTION INTRAVENOUS; SUBCUTANEOUS at 17:28

## 2023-01-07 RX ADMIN — SIMETHICONE 80 MG: 80 TABLET, CHEWABLE ORAL at 21:41

## 2023-01-07 RX ADMIN — Medication 1 TABLET: at 10:11

## 2023-01-07 RX ADMIN — MELATONIN TAB 3 MG 3 MG: 3 TAB at 21:42

## 2023-01-07 RX ADMIN — HEPARIN SODIUM 5000 UNITS: 5000 INJECTION INTRAVENOUS; SUBCUTANEOUS at 13:11

## 2023-01-07 RX ADMIN — ASPIRIN 81 MG CHEWABLE TABLET 81 MG: 81 TABLET CHEWABLE at 10:11

## 2023-01-07 RX ADMIN — SIMETHICONE 80 MG: 80 TABLET, CHEWABLE ORAL at 13:10

## 2023-01-07 RX ADMIN — OXYCODONE HYDROCHLORIDE 10 MG: 10 TABLET ORAL at 21:56

## 2023-01-07 RX ADMIN — ATORVASTATIN CALCIUM 40 MG: 40 TABLET, FILM COATED ORAL at 10:12

## 2023-01-07 RX ADMIN — TAMSULOSIN HYDROCHLORIDE 0.4 MG: 0.4 CAPSULE ORAL at 16:05

## 2023-01-07 RX ADMIN — ACETAMINOPHEN 975 MG: 325 TABLET, FILM COATED ORAL at 21:41

## 2023-01-07 RX ADMIN — METHOCARBAMOL 500 MG: 500 TABLET ORAL at 17:27

## 2023-01-07 RX ADMIN — SIMETHICONE 80 MG: 80 TABLET, CHEWABLE ORAL at 17:26

## 2023-01-07 RX ADMIN — INSULIN LISPRO 6 UNITS: 100 INJECTION, SOLUTION INTRAVENOUS; SUBCUTANEOUS at 10:08

## 2023-01-07 RX ADMIN — PANTOPRAZOLE SODIUM 40 MG: 40 TABLET, DELAYED RELEASE ORAL at 16:05

## 2023-01-07 RX ADMIN — INSULIN LISPRO 6 UNITS: 100 INJECTION, SOLUTION INTRAVENOUS; SUBCUTANEOUS at 13:12

## 2023-01-07 RX ADMIN — AMLODIPINE BESYLATE 5 MG: 5 TABLET ORAL at 17:27

## 2023-01-07 NOTE — PROGRESS NOTES
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  • s/p hepatic resection and reversal of colostomy on 11/7  • s/p right hemicolectomy with partial descending colectomy colocolonic anastomosis with loop ileostomy and mid line abdominal VAC placement 11/16  • Continue local wound care; WC following  • CT  Abd/pelvis 12/17 = loculated collection along splenic recess   Has perisplenic hilum collection as well --> to IR 12/20 for drain placement in both perigastric and perisplenic area and cx sent from both areas = Ecoli  • Flush drains with 10ml qd; drained 15/20/17 on 1/5  • S-O following/Dr Aman Hannah was in to see pt 1/2/23  • Tube check (incl perc alvina tube) done 1/3 = unchanged with still some fluid around each drain so kept in  Next tube check 2 weeks @1/17  • Bloody drainage in VICTORINO #3 and IR in to see pt and felt 2/2 tube manipulation when he went for his tube check     Acute cholecystitis  • s/p percutaneous tube placement 12/8 with tube check on 12/12, 12/14  • GI saw due to alk phos elevation = felt 2/2 infiltrative disease rather than focal biliary obstruction   AMA was negative     • Alkaline phosphatase isoenzyme sent 12/17 (48% liver/52% bone) = GI  recommended continuing to monitor his LFTs and defer to surgical oncology for further w/u and plan   Last 3 APs were [phone]  Hair Reyes recheck 1/3    • TB improving = to consider MRCP if total bili trends back up but was normal 12/30  • Alvina tube = 10 ml 1/5/23     • Drainage still has dark blood in it but seems to slowly becoming less     • Per GI, do cholangiogram with next tube check --> wasn't done 1/3 and GI aware  • GI wrote note 1/4 = aware labs incl GGT of 1992 (previously 5972 on 12/17)     Bacteremia/abdominal abscess  • ID following   • s/p Ertapenem was switched to Doxycycline per ID as of 12/30  • Blood cultures negative  • CT abdomen and pelvis as above  • + nausea since starting Doxy on 12/30   Lasted 1-2 hrs and was affecting appetite pt said   • Changed back to Ertapenem 1/3/23 since was having nausea with the Doxy - to be completed today     Hypertension  • On Norvasc 5mg BID  • Usually gets doses but held AM 1/4 for SBP 1/4     Diabetes mellitus  • Home:  Glargine 35U qam/Glyburide-Metformin 52887 qam/Januvia 100mg qam  • Here:  Lantus 10U qhs/Lispro 6U TID  Lispro to be held if pt does not eat  Consider metformin Monday    • Has a DEXCOM meter at home  • Continue DM diet and QID Accuchecks/SSI  • Eating/meal completion is erratic and therefore BSs difficult       Acute on chronic renal failure  • Stage III; baseline 1 2-1 4  • Lisinopril currently on hold  • s/p NSS IVF with improvement of creat =  back down to 1 2 12/30  • Stable at 1 1     Anemia  • Multifactorial due to recent infection, surgery, CKD stage III  • Transfused on 12/15, 12/16/22 and 12/30  • Hemoglobin 1/2/23 stable at 8 1     Atypical chest pain  • With elevation in troponin/EKG changes on acute side  • Cardiology saw then and cleared pt for discharge  • Did not recommend any further work up  • No further chest pain     Situational depression  • Neuropsychology consulted  • Patient not interested in medications at this point in time     Malnutrition  • Glucerna makes him nauseated  • On 12/15, ordered double protein     Pleural effusion  • CXR on 12/14 showed small left pleural effusion and was suspect for CHF  • ECHO 11/9/22 = LVEF 55%, unable to assess diastolic function, mild TR     L sided pleuritic pain  • located near drain site and per patient only occurs at night  • No evidence of infection        Discharge date:  1/9/23          The above assessment and plan was reviewed and updated as determined by my evaluation of the patient on 1/7/2023      Labs:   Results from last 7 days   Lab Units 01/06/23  0618 01/02/23  0602   WBC Thousand/uL 12 41* 12 67*   HEMOGLOBIN g/dL 8 2* 8 1*   HEMATOCRIT % 26 1* 26 9*   PLATELETS Thousands/uL 480* 620*     Results from last 7 days   Lab Units 01/06/23  0618 01/03/23  0635   SODIUM mmol/L 135 136   POTASSIUM mmol/L 3 7 4 1   CHLORIDE mmol/L 107 108   CO2 mmol/L 20* 19*   BUN mg/dL 20 20   CREATININE mg/dL 1 13 1 30   CALCIUM mg/dL 8 7 8 5             Results from last 7 days   Lab Units 01/07/23  0639 01/06/23  2132 01/06/23  1606   POC GLUCOSE mg/dl 98 136 154*       Review of Scheduled Meds:  Current Facility-Administered Medications   Medication Dose Route Frequency Provider Last Rate   • acetaminophen  975 mg Oral Q8H [address] GAURANG Keith     • amLODIPine  5 mg Oral BID GAURANG Keith     • aspirin  81 mg Oral Daily GAURANG Keith     • atorvastatin  40 mg Oral Daily GAURANG Keith     • calcium carbonate  500 mg Oral BID PRN GAURANG Barrow     • cholecalciferol  400 Units Oral Daily GAURANG Keith     • collagenase   Topical Daily Isaias Zaragoza MD     • ertapenem  1,000 mg Intravenous Q24H Noé Zarco MD 1,000 mg (01/06/23 2133)   • heparin (porcine)  5,000 Units Subcutaneous Q8H [address] GAURANG Keith     • insulin glargine  10 Units Subcutaneous HS GAURANG Foreman     • insulin lispro  1-5 Units Subcutaneous HS GAURANG Keith     • insulin lispro  1-6 Units Subcutaneous TID AC GAURANG Keith     • insulin lispro  6 Units Subcutaneous TID With Meals GAURANG Foreman     • iohexol  50 mL Intravenous Once in imaging Giorgi Dobbs MD     • melatonin  3 mg Oral HS GAURANG Keith     • methocarbamol  500 mg Oral BID GAURANG Barrow     • multivitamin-minerals  1 tablet Oral Daily GAURANG Keith     • ondansetron  4 mg Oral Q6H PRN GAURANG Keith     • ondansetron  4 mg Oral BID GAURANG Jo     • oxyCODONE  10 mg Oral Q6H PRN GAURANG Glover     • oxyCODONE  2 5 mg Oral Q3H PRN GAURANG Glover     • oxyCODONE  5 mg Oral Q6H PRN GAURANG Keith     • pantoprazole  40 mg Oral BID AC GAURANG Keith     • simethicone  80 mg Oral 4x Daily (with meals and at bedtime) GAURANG Glover     • tamsulosin  0 4 mg Oral Daily With Dinner GAURANG Glover         Subjective/ HPI: Patient seen and examined  Patients overnight issues or events were reviewed with nursing or staff during rounds or morning huddle session  New or overnight issues include the following:     Pt seen in his room  He is doing well  He denies any other complaints  ROS:   A 10 point ROS was performed; negative except as noted above  Imaging:     IR drainage tube check and/or removal   Final Result by Joey Borden MD (01/04 6665)   Impression:   1  Drain check demonstrated all 3 drains to be patent  No fistula to the bowel  There are still small collections around each drain and therefore the drains were left in place  PLAN: Tubes to bag drainage  Return in 2 weeks for tube checks  Workstation performed: [id number]         IR drainage tube placement   Final Result by Cookie Tran MD (12/20 2003)      Percutaneous placement of abscess drainage catheters into the perigastric and perisplenic abscesses using 10-Macedonian catheters  Plan:       - Catheters to bulb suction drainage    - Flush catheters with 10 mL normal saline daily    - Return in 2 weeks for drain check  Workstation performed: [id number]         CT abdomen pelvis w contrast   Final Result by Alex Gonzalez MD (14/48 4589)      Status post cholecystostomy tube placement with decompression of the gallbladder  In addition, a catheter placed adjacent to the cut edge of the liver is increased position with near complete resolution of fluid collection        Loculated collection along the splenic recess of the lesser sac is unchanged in size  Additional perisplenic collection in the hilum and along the inferior pole are unchanged  Collection of fluid and gas in the left upper quadrant adjacent to left kidney also not significantly changed  No extravasated oral contrast             Workstation performed: [id number]         IR drainage tube check/change/reposition/reinsertion/upsize    (Results Pending)   IR cholecystostomy tube check and/or removal    (Results Pending)       *Labs /Radiology studies reviewed  *Medications reviewed and reconciled as needed  *Please refer to order section for additional ordered labs studies  *Case discussed with primary attending during morning huddle case rounds    Physical Examination:  Vitals:   Vitals:    01/06/23 1825 01/06/23 2140 01/07/23 0630 01/07/23 1012   BP: 122/78 133/77 140/81 126/72   BP Location:  Left arm Left arm    Pulse:  89 80    Resp:  18 16    Temp:  98 2 °F (36 8 °C) 98 2 °F (36 8 °C)    TempSrc:  Oral Oral    SpO2:  98% 97%    Weight:       Height:           General Appearance: no distress, conversive  HEENT:  External ear normal   Nose normal w/o drainage  Mucous membranes are moist  Oropharynx is clear  Conjunctiva clear w/o icterus or redness  Neck:  Supple, normal ROM  Lungs: BBS without crackles/wheeze/rhonchi; respirations unlabored with normal inspiratory/expiratory effort  No retractions noted  On RA  CV: regular rate and rhythm; no rubs/murmurs/gallops, PMI normal   ABD: Abdomen is soft  Bowel sounds all quadrants  Nontender with no distention  Drain labeled "back" is still with small amt bloody drained and the other 2 with tan purulent  Perc radha tube with dark bloody drainage  EXT: no edema  Skin: normal turgor, normal texture, no rashes  Psych: affect normal, mood normal  Neuro: AAO      The above physical exam was reviewed and updated as determined by my evaluation of the patient on 1/7/2023      Invasive Devices     Central Venous Catheter Line  Duration Port A Cath 03/10/22 Right Chest 303 days          Drain  Duration           Ileostomy LUQ 50 days    Abscess Drain Abdomen 29 days    Cholecystostomy Tube 25 days    Abscess Drain Abdomen 17 days    Abscess Drain Back 17 days                   VTE Pharmacologic Prophylaxis: Heparin  Code Status: Level 1 - Full Code  Current Length of Stay: 24 day(s)      Total time spent:  30 minutes with more than 50% spent counseling/coordinating care  Counseling includes discussion with patient re: progress  and discussion with patient of his/her current medical state/information  Coordination of patient's care was performed in conjunction with primary service  Time invested included review of patient's labs, vitals, and management of their comorbidities with continued monitoring  In addition, this patient was discussed with medical team including physician and advanced extenders  The care of the patient was extensively discussed and appropriate treatment plan was formulated unique for this patient  Medical decision making for the day was made by supervising physician unless otherwise noted in their attestation statement  ** Please Note:  voice to text software may have been used in the creation of this document   Although proof errors in transcription or interpretation are a potential of such software**

## 2023-01-07 NOTE — PLAN OF CARE
Problem: Prexisting or High Potential for Compromised Skin Integrity  Goal: Skin integrity is maintained or improved  Description: INTERVENTIONS:  - Identify patients at risk for skin breakdown  - Assess and monitor skin integrity  - Assess and monitor nutrition and hydration status  - Monitor labs   - Assess for incontinence   - Turn and reposition patient  - Assist with mobility/ambulation  - Relieve pressure over bony prominences  - Avoid friction and shearing  - Provide appropriate hygiene as needed including keeping skin clean and dry  - Evaluate need for skin moisturizer/barrier cream  - Collaborate with interdisciplinary team   - Patient/family teaching  - Consider wound care consult   Outcome: Progressing     Problem: SAFETY ADULT  Goal: Patient will remain free of falls  Description: INTERVENTIONS:  - Educate patient/family on patient safety including physical limitations  - Instruct patient to call for assistance with activity   - Consult OT/PT to assist with strengthening/mobility   - Keep Call bell within reach  - Keep bed low and locked with side rails adjusted as appropriate  - Keep care items and personal belongings within reach  - Initiate and maintain comfort rounds  - Make Fall Risk Sign visible to staff  - Offer Toileting every 2 Hours, in advance of need  - Initiate/Maintain alarm  - Obtain necessary fall risk management equipment:   - Apply yellow socks and bracelet for high fall risk patients  - Consider moving patient to room near nurses station  Outcome: Progressing

## 2023-01-07 NOTE — PROGRESS NOTES
01/07/23 1400   Pain Assessment   Pain Assessment Tool 0-10   Pain Score No Pain   Restrictions/Precautions   Precautions Fall Risk;Contact/isolation;Multiple lines  (VICTORINO drain, ileostomy, IR drain)   Weight Bearing Restrictions No   Cognition   Arousal/Participation Cooperative   Subjective   Subjective Pt seen layin supine in bed, reporting being very tired and cold but agreeable to participate  Sit to Lying   Type of Assistance Needed Supervision   Sit to Lying CARE Score 4   Lying to Sitting on Side of Bed   Type of Assistance Needed Supervision   Lying to Sitting on Side of Bed CARE Score 4   Sit to Stand   Type of Assistance Needed Supervision   Sit to Stand CARE Score 4   Bed-Chair Transfer   Type of Assistance Needed Supervision; Adaptive equipment   Chair/Bed-to-Chair Transfer CARE Score 4   Car Transfer   Comment Declined car tx today but agreeable to participate tomorrow   Walk 10 Feet   Type of Assistance Needed Supervision; Adaptive equipment   Walk 10 Feet CARE Score 4   Walk 50 Feet with Two Turns   Type of Assistance Needed Supervision; Adaptive equipment   Walk 50 Feet with Two Turns CARE Score 4   Walk 150 Feet   Reason if not Attempted Safety concerns   Walk 150 Feet CARE Score 88   Ambulation   Primary Mode of Locomotion Prior to Admission Walk   Distance Walked (feet) 70 ft   Assist Device Roller Walker   Gait Pattern Antalgic; Slow Josefina; Step through; Forward Flexion   Limitations Noted In Balance; Endurance;Speed;Strength   Provided Assistance with: Direction   Walk Assist Level Close Supervision   Findings Pt able to ambulate up to 70ft with RW but primarily limited by fatigue and weakness  Pt performed fwd/bwd ambulation, about 20ft ea direction, with unilateral HR with CGA and decreased gait speed   Does the patient walk? 2  Yes   Wheelchair mobility   Does the patient use a wheelchair? 1   Yes   Curb or Single Stair   Style negotiated Curb   Type of Assistance Needed Incidental touching;Supervision; Adaptive equipment   1 Step (Curb) CARE Score 4   Stairs   Type Curb   # of Steps 2   Weight Bearing Precautions Fall Risk   Assist Devices Roller Walker   Findings Negotiated 6" x2 curb step with RW with CS   Picking Up Object   Type of Assistance Needed Supervision; Adaptive equipment   Comment with reacher and RW   Picking Up Object CARE Score 4   Assessment   Treatment Assessment PT session focusing on endurance with transfers, ambulation, curb negotiation, and standing balance with p/u objects with reacher at a CS level  Pt able to ambulate up to 70ft with RW and ambulate about 20ft fwd and bwd with unilateral HR with CGA-CS  Pt able to perform supine <> sit with supervision after having difficulty performing task yesterday due to fatigue  Discharge was planned for 1/9 but likely delayed at this time until 1/10-1/11 due to need for equipment and [facility] services  Pt is progressing towards goals to promote safe d/c home with [facility]  Problem List Decreased strength;Decreased endurance; Impaired balance;Decreased mobility   Barriers to Discharge Inaccessible home environment;Decreased caregiver support   PT Barriers   Physical Impairment Decreased strength;Decreased endurance; Impaired balance;Decreased mobility   Functional Limitation Car transfers; Ramp negotiation;Stair negotiation;Standing;Transfers; Walking; Wheelchair management   Plan   Treatment/Interventions Functional transfer training;LE strengthening/ROM; Elevations; Therapeutic exercise; Endurance training;Patient/family training;Bed mobility;Gait training   Progress Progressing toward goals   Recommendation   PT Discharge Recommendation Home with home health rehabilitation   PT Therapy Minutes   PT Time In 1400   PT Time Out 1450   PT Total Time (minutes) 50   PT Mode of treatment - Individual (minutes) 50   PT Mode of treatment - Concurrent (minutes) 0   PT Mode of treatment - Group (minutes) 0   PT Mode of treatment - Co-treat (minutes) 0   PT Mode of Treatment - Total time(minutes) 50 minutes   PT Cumulative Minutes 1505   Therapy Time missed   Time missed?  No

## 2023-01-07 NOTE — PROGRESS NOTES
PM&R Coverage Progress Note:    Rehab Diagnosis: Impairment of mobility, safety and Activities of Daily Living (ADLs) due to Neurologic Conditions:  03 8  Neuromuscular Disorders    ASSESSMENT: Fatigue, pain, difficulty coping, limited appetite      PLAN:    Rehabilitation  • Continue current rehabilitation plan of care to maximize function  • Making some overall improvements with eating compared to weeks prior  • Pain in abdomen more at night and with bed mobility, otherwise well controlled    Medical issues  • Reviewed drain outpt  • Last day of Ertapenem, will need to monitor vitals and CBC once off  • Continue current medical plan of care  Appreciate IM consultants co-management  SUBJECTIVE: Patient seen face to face  Progressing as expected in rehabilitation program pending discharge early next week  He has been making improvements but endorses chronic pain issues, fatigue, and limited appetite, however overall appetite is improved  Pain related to drains especially with bed mobility  Nervous about discharge, but also looking forward to it if equipment needed can be obtained  Patient denies fever, chills, nausea, emesis, cough, shortness of breath, diarrhea, or constipation  Mood stable  Pain is controlled  ROS:  A ten point review of systems was completed on 01/07/23 and pertinent positives are listed in subjective section  All other systems reviewed were negative  OBJECTIVE:   /72 (BP Location: Right arm)   Pulse 91   Temp 98 6 °F (37 °C) (Oral)   Resp 18   Ht 5' 10" (1 778 m)   Wt 104 kg (230 lb)   SpO2 96%   BMI 33 00 kg/m²     Physical Exam  Vitals reviewed  Constitutional:       General: He is not in acute distress  Appearance: He is ill-appearing  HENT:      Head: Normocephalic and atraumatic  Right Ear: External ear normal       Left Ear: External ear normal       Nose: Nose normal  No rhinorrhea        Mouth/Throat:      Mouth: Mucous membranes are moist  Pharynx: Oropharynx is clear  Eyes:      General: No scleral icterus  Cardiovascular:      Rate and Rhythm: Normal rate  Pulses: Normal pulses  Pulmonary:      Effort: Pulmonary effort is normal  No respiratory distress  Abdominal:      General: There is no distension  Palpations: Abdomen is soft  Comments: Ostomy, midline abdominal wound covered, perc radha tube, drains   Musculoskeletal:      Right lower leg: No edema  Left lower leg: No edema  Skin:     General: Skin is warm  Neurological:      Mental Status: He is alert and oriented to person, place, and time     Psychiatric:         Mood and Affect: Mood normal          Behavior: Behavior normal           Lab Results   Component Value Date    WBC 12 41 (H) 01/06/2023    HGB 8 2 (L) 01/06/2023    HCT 26 1 (L) 01/06/2023    MCV 84 01/06/2023     (H) 01/06/2023     Lab Results   Component Value Date    SODIUM 135 01/06/2023    K 3 7 01/06/2023     01/06/2023    CO2 20 (L) 01/06/2023    BUN 20 01/06/2023    CREATININE 1 13 01/06/2023    GLUC 123 01/06/2023    CALCIUM 8 7 01/06/2023     Lab Results   Component Value Date    INR 1 12 12/19/2022    INR 1 10 11/12/2022    INR 1 24 (H) 11/09/2022    PROTIME 14 7 (H) 12/19/2022    PROTIME 14 4 11/12/2022    PROTIME 15 8 (H) 11/09/2022           Current Facility-Administered Medications:   •  acetaminophen (TYLENOL) tablet 975 mg, 975 mg, Oral, Q8H [address], GAURANG Keith, 975 mg at 01/07/23 1310  •  amLODIPine (NORVASC) tablet 5 mg, 5 mg, Oral, BID, GAURANG Keith, 5 mg at 01/07/23 1012  •  aspirin chewable tablet 81 mg, 81 mg, Oral, Daily, GAURANG Keith, 81 mg at 01/07/23 1011  •  atorvastatin (LIPITOR) tablet 40 mg, 40 mg, Oral, Daily, GAURANG Keith, 40 mg at 01/07/23 1012  •  calcium carbonate (TUMS) chewable tablet 500 mg, 500 mg, Oral, BID PRN, GAURANG Keith, 500 mg at 01/06/23 1402  •  cholecalciferol (VITAMIN D3) tablet 400 Units, 400 Units, Oral, Daily, GAURANG Keith, 400 Units at 01/07/23 1013  •  collagenase (SANTYL) ointment, , Topical, Daily, Jeana Garnett MD, Given at 01/07/23 1014  •  ertapenem (INVanz) 1,000 mg in sodium chloride 0 9 % 50 mL IVPB, 1,000 mg, Intravenous, Q24H, Amy Alvarado MD, Last Rate: 100 mL/hr at 01/06/23 2133, 1,000 mg at 01/06/23 2133  •  heparin (porcine) subcutaneous injection 5,000 Units, 5,000 Units, Subcutaneous, Q8H [address], GAURANG Keith, 5,000 Units at 01/07/23 1311  •  insulin glargine (LANTUS) subcutaneous injection 10 Units 0 1 mL, 10 Units, Subcutaneous, HS, GAURANG Arias, 10 Units at 01/06/23 2134  •  insulin lispro (HumaLOG) 100 units/mL subcutaneous injection 1-5 Units, 1-5 Units, Subcutaneous, HS, GAURANG Keith, 1 Units at 01/04/23 2149  •  insulin lispro (HumaLOG) 100 units/mL subcutaneous injection 1-6 Units, 1-6 Units, Subcutaneous, TID AC, 1 Units at 01/07/23 1311 **AND** Fingerstick Glucose (POCT), , , TID AC, GAURANG Keith  •  insulin lispro (HumaLOG) 100 units/mL subcutaneous injection 6 Units, 6 Units, Subcutaneous, TID With Meals, GAURANG Simpson, 6 Units at 01/07/23 1312  •  iohexol (OMNIPAQUE) 350 MG/ML injection (SINGLE-DOSE) 50 mL, 50 mL, Intravenous, Once in imaging, Louie Martinez MD  •  melatonin tablet 3 mg, 3 mg, Oral, HS, GAURANG Keith, 3 mg at 01/06/23 2134  •  methocarbamol (ROBAXIN) tablet 500 mg, 500 mg, Oral, BID, GAURANG Keith, 500 mg at 01/07/23 1011  •  multivitamin-minerals (CENTRUM) tablet 1 tablet, 1 tablet, Oral, Daily, GAURANG Keith, 1 tablet at 01/07/23 1011  •  ondansetron (ZOFRAN-ODT) dispersible tablet 4 mg, 4 mg, Oral, Q6H PRN, GAURANG Keith, 4 mg at 01/06/23 1402  •  ondansetron (ZOFRAN-ODT) dispersible tablet 4 mg, 4 mg, Oral, BID, GAURANG Arias, 4 mg at 01/06/23 4060  •  oxyCODONE (ROXICODONE) immediate release tablet 10 mg, 10 mg, Oral, Q6H PRN, GAURANG Keith, 10 mg at 01/05/23 2144  •  oxyCODONE (ROXICODONE) IR tablet 2 5 mg, 2 5 mg, Oral, Q3H PRN, GAURANG Keith, 2 5 mg at 12/25/22 1911  •  oxyCODONE (ROXICODONE) IR tablet 5 mg, 5 mg, Oral, Q6H PRN, GAURANG Keith, 5 mg at 01/07/23 0246  •  pantoprazole (PROTONIX) EC tablet 40 mg, 40 mg, Oral, BID AC, GAURANG Keith, 40 mg at 01/07/23 9863  •  simethicone (MYLICON) chewable tablet 80 mg, 80 mg, Oral, 4x Daily (with meals and at bedtime), GAURANG Keith, 80 mg at 01/07/23 1310  •  tamsulosin (FLOMAX) capsule 0 4 mg, 0 4 mg, Oral, Daily With Dinner, GAURANG Guerrero, 0 4 mg at 01/06/23 1648    Past Medical History:   Diagnosis Date   • Abdominal pain 03/12/2022   • Acute renal failure ([place] [address] )     [id number] resolved   • Cancer ([place] [address] )    • Diabetes mellitus ([place] [address] )    • Enteritis 08/23/2016   • Gastroparesis due to DM ([facility] ) 08/23/2016   • GERD (gastroesophageal reflux disease)    • Hernia, ventral 08/04/2016   • Hyperlipidemia    • Hypertension    • Morbid obesity ([place] [address] ) 04/17/2018   • Postoperative visit 03/02/2022   • SIRS (systemic inflammatory response syndrome) ([place] [address] ) 03/12/2022   • Snoring    • Stage 3a chronic kidney disease ([place] [address] ) 02/19/2022       Patient Active Problem List    Diagnosis Date Noted   • Malignant neoplasm of transverse colon ([facility] ) 03/01/2022   • Dehiscence of incision 12/30/2022   • Elevated alkaline phosphatase level 12/30/2022   • Leukocytosis 12/29/2022   • Abscess 12/27/2022   • Acute pain 12/27/2022   • Sepsis ([facility] ) 12/17/2022   • Severe protein-calorie malnutrition ([facility] ) 12/14/2022   • Acute on chronic kidney failure ([facility] ) 12/14/2022   • Flash pulmonary edema ([facility] ) 11/12/2022   • MR (mitral regurgitation) 11/12/2022   • Bacteremia 11/12/2022   • ESBL (extended spectrum beta-lactamase) producing bacteria infection 11/12/2022   • Acute respiratory failure with hypoxia ([facility] ) 11/12/2022   • Encephalopathy 11/09/2022   • Cervical radiculopathy 10/26/2022   • Other fatigue 06/15/2022   • Colostomy prolapse ([place] [address] ) 05/27/2022   • Colon cancer metastasized to liver ([place] [address] ) 03/12/2022   • 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 liver ([place] [address] ) 03/02/2022   • Iron deficiency anemia, unspecified 03/01/2022   • Thrombocytosis 02/21/2022   • Hypokalemia 02/19/2022   • Transaminitis 02/19/2022   • Type 2 diabetes mellitus with hyperlipidemia ([place] [address] ) 02/18/2022   • Colonic mass 02/18/2022   • Microcytic anemia 02/18/2022   • Left ureteral calculus 01/30/2020   • Incarcerated umbilical hernia 82/33/0124   • Testicular hypogonadism 06/19/2017   • Low testosterone 05/30/2017   • Type 2 diabetes mellitus without complication, with long-term current use of insulin ([place] [address] ) 08/23/2016   • Benign essential hypertension 08/23/2016   • Mixed hyperlipidemia 08/23/2016   • Erectile dysfunction 07/11/2016   • Obesity (BMI 30-39 9) 07/11/2016        Rufina Rivera,   Physical Medicine and Chris Luu      Total time spent:  20 minutes with more than 50% spent counseling/coordinating care  Counseling includes discussion with patient re: progress and discussion with patient of his/her current medical/functional state/information  Coordination of patient's care was performed in conjunction with consulting services  Time invested included review of patient's labs, vitals, and management of their comorbidities with continued monitoring  The care of the patient was extensively discussed and appropriate treatment plan was formulated unique for this patient  ** Please Note:  voice to text software may have been used in the creation of this document   Although proof errors in transcription or interpretation are a potential of such software**

## 2023-01-07 NOTE — PROGRESS NOTES
01/07/23 1100   Pain Assessment   Pain Assessment Tool 0-10   Pain Score No Pain   Restrictions/Precautions   Precautions Fall Risk;Contact/isolation;Multiple lines  (VICTORINO drain, ileostomy, IR drain)   Weight Bearing Restrictions No   ROM Restrictions No   Cognition   Arousal/Participation Cooperative   Subjective   Subjective Pt tearful due to hearing via phone from his wife that his stay is being extended to Thursday instead of Monday  Pt agreeable to continue participating in PT  Lying to Sitting on Side of Bed   Type of Assistance Needed Supervision   Lying to Sitting on Side of Bed CARE Score 4   Sit to Stand   Type of Assistance Needed Supervision   Sit to Stand CARE Score 4   Bed-Chair Transfer   Type of Assistance Needed Supervision; Adaptive equipment   Chair/Bed-to-Chair Transfer CARE Score 4   Transfer Bed/Chair/Wheelchair   Sit to Stand Supervision   Stand to Sit Supervision   Supine to Sit Supervision   Car Transfer   Comment Pt declined performing car tx today, agreeable tomorrow   Walk 10 Feet   Type of Assistance Needed Supervision; Adaptive equipment   Comment RW   Walk 10 Feet CARE Score 4   Walk 50 Feet with Two Turns   Type of Assistance Needed Supervision; Adaptive equipment   Comment RW   Walk 50 Feet with Two Turns CARE Score 4   Walk 150 Feet   Reason if not Attempted Safety concerns   Walk 150 Feet CARE Score 88   Ambulation   Primary Mode of Locomotion Prior to Admission Walk   Distance Walked (feet) 70 ft   Assist Device Roller Walker   Gait Pattern Slow Josefina; Forward Flexion; Step through   Limitations Noted In Endurance;Speed;Strength   Provided Assistance with: Direction   Walk Assist Level Close Supervision   Does the patient walk? 2  Yes   Wheelchair mobility   Does the patient use a wheelchair? 1  Yes   Curb or Single Stair   Style negotiated Curb   Type of Assistance Needed Incidental touching;Supervision; Adaptive equipment   1 Step (Curb) CARE Score 4   Stairs   Type Curb   # of Steps 2   Weight Bearing Precautions Fall Risk   Assist Devices [place]   Findings Negotiated 6" x2 curb step with RW with [facility]   Assessment   Treatment Assessment Pt agreeable to participate in PT session although tearful after learning that his d/c may be delayed to Tuesday or Wednesday due to lack of equipment and finding [facility] services  Pt overall demonstrating ability to perform household distances and negotiate curb step at  level  Pt able to ambulate up to 70ft and negotiate 2 curb steps with [facility]-  Pt has no LOB but fatigues quickly due to endurance deficits  Pt is continuing to progress towards reaching goals to ensure safe d/c home with [facility] next week  Problem List Decreased strength;Decreased endurance; Impaired balance;Decreased mobility; Decreased skin integrity;Pain   Barriers to Discharge Inaccessible home environment;Decreased caregiver support   PT Barriers   Physical Impairment Decreased strength;Decreased endurance; Impaired balance;Decreased mobility   Functional Limitation Car transfers;Stair negotiation;Standing;Transfers; Walking;Ramp negotiation; Wheelchair management   Plan   Treatment/Interventions Functional transfer training;LE strengthening/ROM; Elevations; Therapeutic exercise; Endurance training;Patient/family training;Bed mobility;Gait training   Progress Progressing toward goals   PT Therapy Minutes   PT Time In 1100   PT Time Out 1140   PT Total Time (minutes) 40   PT Mode of treatment - Individual (minutes) 40   PT Mode of treatment - Concurrent (minutes) 0   PT Mode of treatment - Group (minutes) 0   PT Mode of treatment - Co-treat (minutes) 0   PT Mode of Treatment - Total time(minutes) 40 minutes   PT Cumulative Minutes 1455   Therapy Time missed   Time missed?  No

## 2023-01-07 NOTE — PROGRESS NOTES
01/07/23 1000   Pain Assessment   Pain Assessment Tool 0-10   Pain Score No Pain   Restrictions/Precautions   Precautions Fall Risk;Contact/isolation;Multiple lines  (VICTORINO drain, ileostomy, IR drain)   Weight Bearing Restrictions No   ROM Restrictions No   Exercise Tools   Exercise Tools Yes   Theraband Educated pt on UE strengthening HEP utilizing yellow theraband, and providing print out of exercises  Educated pt on completing [id number] each exercise, 1x/day, 5days/week  Pt completed [id number] each exercise with min vc's provided to maximize technique, and with pt required rest breaks to manage fatigue and emotional support during breaks to assist with emotional lability  Focus of HEP was on increasing UE strength/endurance for increased safety and independence during fxl transfers  Cognition   Overall Cognitive Status WFL   Arousal/Participation Alert; Cooperative   Attention Attends with cues to redirect   Orientation Level Oriented X4   Memory Within functional limits   Following Commands Follows one step commands with increased time or repetition   Activity Tolerance   Activity Tolerance   (pt tearful on and off t/o session, requiring breaks for emotional support and redirection)   Assessment   Treatment Assessment Pt seen for 60min skilled OT session focused on initiating UE strengthening HEP w/theraband, for increased safety and independence during ADLs/IADLs and decreased caregiver burden  See detailed descriptions of fxl performance above  Pt tolerated session but required occasional rest breaks to manage emotional lability stemming from anxiety regarding upcoming D/C home as well as diagnosis and future surgery  RAMON provided extensive emotional support and gentle redirection back to task, pt receptive   Pt would benefit from continued skilled OT focused on ADL retraining, LHAE education/training, Functional Transfers, Standing tolerance, Standing balance , Fine motor strengthening , Gross motor strengthening , DME training/education, Family training/education, Energy conservation training/education, healthy coping education, Leisure and social pursuits and Core/trunk control/strengthening  Prognosis Fair   Problem List Decreased strength;Decreased endurance; Impaired balance;Decreased mobility   Barriers to Discharge Inaccessible home environment;Decreased caregiver support   Plan   Treatment/Interventions ADL retraining;Functional transfer training; Therapeutic exercise; Endurance training;Patient/family training;Equipment eval/education; Compensatory technique education   Progress Progressing toward goals   Recommendation   OT Discharge Recommendation Home with home health rehabilitation   OT Therapy Minutes   OT Time In 1000   OT Time Out 1100   OT Total Time (minutes) 60   OT Mode of treatment - Individual (minutes) 60   OT Mode of treatment - Concurrent (minutes) 0   OT Mode of treatment - Group (minutes) 0   OT Mode of treatment - Co-treat (minutes) 0   OT Mode of Treatment - Total time(minutes) 60 minutes   OT Cumulative Minutes 6748   Therapy Time missed   Time missed?  No

## 2023-01-08 LAB
GLUCOSE SERPL-MCNC: 123 MG/DL (ref 65–140)
GLUCOSE SERPL-MCNC: 151 MG/DL (ref 65–140)
GLUCOSE SERPL-MCNC: 159 MG/DL (ref 65–140)
GLUCOSE SERPL-MCNC: 171 MG/DL (ref 65–140)

## 2023-01-08 RX ADMIN — ATORVASTATIN CALCIUM 40 MG: 40 TABLET, FILM COATED ORAL at 08:21

## 2023-01-08 RX ADMIN — CHOLECALCIFEROL TAB 10 MCG (400 UNIT) 400 UNITS: 10 TAB at 08:23

## 2023-01-08 RX ADMIN — ACETAMINOPHEN 975 MG: 325 TABLET, FILM COATED ORAL at 13:29

## 2023-01-08 RX ADMIN — SIMETHICONE 80 MG: 80 TABLET, CHEWABLE ORAL at 08:21

## 2023-01-08 RX ADMIN — TAMSULOSIN HYDROCHLORIDE 0.4 MG: 0.4 CAPSULE ORAL at 17:02

## 2023-01-08 RX ADMIN — SIMETHICONE 80 MG: 80 TABLET, CHEWABLE ORAL at 12:25

## 2023-01-08 RX ADMIN — INSULIN LISPRO 6 UNITS: 100 INJECTION, SOLUTION INTRAVENOUS; SUBCUTANEOUS at 17:05

## 2023-01-08 RX ADMIN — INSULIN GLARGINE 10 UNITS: 100 INJECTION, SOLUTION SUBCUTANEOUS at 21:36

## 2023-01-08 RX ADMIN — METHOCARBAMOL 500 MG: 500 TABLET ORAL at 17:02

## 2023-01-08 RX ADMIN — AMLODIPINE BESYLATE 5 MG: 5 TABLET ORAL at 08:21

## 2023-01-08 RX ADMIN — OXYCODONE HYDROCHLORIDE 10 MG: 10 TABLET ORAL at 21:35

## 2023-01-08 RX ADMIN — INSULIN LISPRO 1 UNITS: 100 INJECTION, SOLUTION INTRAVENOUS; SUBCUTANEOUS at 21:41

## 2023-01-08 RX ADMIN — COLLAGENASE SANTYL: 250 OINTMENT TOPICAL at 08:22

## 2023-01-08 RX ADMIN — ACETAMINOPHEN 975 MG: 325 TABLET, FILM COATED ORAL at 21:35

## 2023-01-08 RX ADMIN — SIMETHICONE 80 MG: 80 TABLET, CHEWABLE ORAL at 17:02

## 2023-01-08 RX ADMIN — Medication 1 TABLET: at 08:21

## 2023-01-08 RX ADMIN — INSULIN LISPRO 6 UNITS: 100 INJECTION, SOLUTION INTRAVENOUS; SUBCUTANEOUS at 13:28

## 2023-01-08 RX ADMIN — ACETAMINOPHEN 975 MG: 325 TABLET, FILM COATED ORAL at 05:49

## 2023-01-08 RX ADMIN — OXYCODONE HYDROCHLORIDE 5 MG: 5 TABLET ORAL at 13:31

## 2023-01-08 RX ADMIN — INSULIN LISPRO 1 UNITS: 100 INJECTION, SOLUTION INTRAVENOUS; SUBCUTANEOUS at 17:05

## 2023-01-08 RX ADMIN — PANTOPRAZOLE SODIUM 40 MG: 40 TABLET, DELAYED RELEASE ORAL at 17:02

## 2023-01-08 RX ADMIN — MELATONIN TAB 3 MG 3 MG: 3 TAB at 21:35

## 2023-01-08 RX ADMIN — AMLODIPINE BESYLATE 5 MG: 5 TABLET ORAL at 17:02

## 2023-01-08 RX ADMIN — SIMETHICONE 80 MG: 80 TABLET, CHEWABLE ORAL at 21:35

## 2023-01-08 RX ADMIN — ASPIRIN 81 MG CHEWABLE TABLET 81 MG: 81 TABLET CHEWABLE at 08:21

## 2023-01-08 RX ADMIN — HEPARIN SODIUM 5000 UNITS: 5000 INJECTION INTRAVENOUS; SUBCUTANEOUS at 21:36

## 2023-01-08 RX ADMIN — INSULIN LISPRO 1 UNITS: 100 INJECTION, SOLUTION INTRAVENOUS; SUBCUTANEOUS at 13:28

## 2023-01-08 RX ADMIN — PANTOPRAZOLE SODIUM 40 MG: 40 TABLET, DELAYED RELEASE ORAL at 06:03

## 2023-01-08 RX ADMIN — HEPARIN SODIUM 5000 UNITS: 5000 INJECTION INTRAVENOUS; SUBCUTANEOUS at 05:49

## 2023-01-08 RX ADMIN — HEPARIN SODIUM 5000 UNITS: 5000 INJECTION INTRAVENOUS; SUBCUTANEOUS at 13:29

## 2023-01-08 RX ADMIN — METHOCARBAMOL 500 MG: 500 TABLET ORAL at 08:21

## 2023-01-08 RX ADMIN — INSULIN LISPRO 6 UNITS: 100 INJECTION, SOLUTION INTRAVENOUS; SUBCUTANEOUS at 08:24

## 2023-01-08 NOTE — PLAN OF CARE
Problem: Prexisting or High Potential for Compromised Skin Integrity  Goal: Skin integrity is maintained or improved  Description: INTERVENTIONS:  - Identify patients at risk for skin breakdown  - Assess and monitor skin integrity  - Assess and monitor nutrition and hydration status  - Monitor labs   - Assess for incontinence   - Turn and reposition patient  - Assist with mobility/ambulation  - Relieve pressure over bony prominences  - Avoid friction and shearing  - Provide appropriate hygiene as needed including keeping skin clean and dry  - Evaluate need for skin moisturizer/barrier cream  - Collaborate with interdisciplinary team   - Patient/family teaching  - Consider wound care consult   Outcome: Progressing     Problem: Potential for Falls  Goal: Patient will remain free of falls  Description: INTERVENTIONS:  - Educate patient/family on patient safety including physical limitations  - Instruct patient to call for assistance with activity   - Consult OT/PT to assist with strengthening/mobility   - Keep Call bell within reach  - Keep bed low and locked with side rails adjusted as appropriate  - Keep care items and personal belongings within reach  - Initiate and maintain comfort rounds  - Make Fall Risk Sign visible to staff  - Offer Toileting every 2-4 Hours, in advance of need  - Initiate/Maintain bed/chair alarm  - Obtain necessary fall risk management equipment: nonskid footwear   - Apply yellow socks and bracelet for high fall risk patients  - Consider moving patient to room near nurses station  Outcome: Progressing     Problem: PAIN - ADULT  Goal: Verbalizes/displays adequate comfort level or baseline comfort level  Description: Interventions:  - Encourage patient to monitor pain and request assistance  - Assess pain using appropriate pain scale  - Administer analgesics based on type and severity of pain and evaluate response  - Implement non-pharmacological measures as appropriate and evaluate response  - Consider cultural and social influences on pain and pain management  - Notify physician/advanced practitioner if interventions unsuccessful or patient reports new pain  Outcome: Progressing     Problem: INFECTION - ADULT  Goal: Absence or prevention of progression during hospitalization  Description: INTERVENTIONS:  - Assess and monitor for signs and symptoms of infection  - Monitor lab/diagnostic results  - Monitor all insertion sites, i e  indwelling lines, tubes, and drains  - Monitor endotracheal if appropriate and nasal secretions for changes in amount and color  - [place] appropriate cooling/warming therapies per order  - Administer medications as ordered  - Instruct and encourage patient and family to use good hand hygiene technique  - Identify and instruct in appropriate isolation precautions for identified infection/condition  Outcome: Progressing     Problem: SAFETY ADULT  Goal: Patient will remain free of falls  Description: INTERVENTIONS:  - Educate patient/family on patient safety including physical limitations  - Instruct patient to call for assistance with activity   - Consult OT/PT to assist with strengthening/mobility   - Keep Call bell within reach  - Keep bed low and locked with side rails adjusted as appropriate  - Keep care items and personal belongings within reach  - Initiate and maintain comfort rounds  - Make Fall Risk Sign visible to staff  - Offer Toileting every 2-4 Hours, in advance of need  - Initiate/Maintain bed/chair alarm  - Obtain necessary fall risk management equipment: nonskid footwear  - Apply yellow socks and bracelet for high fall risk patients  - Consider moving patient to room near nurses station  Outcome: Progressing  Goal: Maintain or return to baseline ADL function  Description: INTERVENTIONS:  -  Assess patient's ability to carry out ADLs; assess patient's baseline for ADL function and identify physical deficits which impact ability to perform ADLs (bathing, care of mouth/teeth, toileting, grooming, dressing, etc )  - Assess/evaluate cause of self-care deficits   - Assess range of motion  - Assess patient's mobility; develop plan if impaired  - Assess patient's need for assistive devices and provide as appropriate  - Encourage maximum independence but intervene and supervise when necessary  - Involve family in performance of ADLs  - Assess for home care needs following discharge   - Consider OT consult to assist with ADL evaluation and planning for discharge  - Provide patient education as appropriate  Outcome: Progressing  Goal: Maintains/Returns to pre admission functional level  Description: INTERVENTIONS:  - Perform BMAT or MOVE assessment daily    - Set and communicate daily mobility goal to care team and patient/family/caregiver  - Collaborate with rehabilitation services on mobility goals if consulted  - Perform Range of Motion 3 times a day  - Reposition patient every 2 hours    - Dangle patient 3 times a day  - Stand patient 3 times a day  - Ambulate patient 3 times a day  - Out of bed to chair 3 times a day   - Out of bed for meals 3 times a day  - Out of bed for toileting  - Record patient progress and toleration of activity level   Outcome: Progressing     Problem: DISCHARGE PLANNING  Goal: Discharge to home or other facility with appropriate resources  Description: INTERVENTIONS:  - Identify barriers to discharge w/patient and caregiver  - Arrange for needed discharge resources and transportation as appropriate  - Identify discharge learning needs (meds, wound care, etc )  - Arrange for interpretive services to assist at discharge as needed  - Refer to Case Management Department for coordinating discharge planning if the patient needs post-hospital services based on physician/advanced practitioner order or complex needs related to functional status, cognitive ability, or social support system  Outcome: Progressing     Problem: Nutrition/Hydration-ADULT  Goal: Nutrient/Hydration intake appropriate for improving, restoring or maintaining nutritional needs  Description: Monitor and assess patient's nutrition/hydration status for malnutrition  Collaborate with interdisciplinary team and initiate plan and interventions as ordered  Monitor patient's weight and dietary intake as ordered or per policy  Utilize nutrition screening tool and intervene as necessary  Determine patient's food preferences and provide high-protein, high-caloric foods as appropriate       INTERVENTIONS:  - Monitor oral intake, urinary output, labs, and treatment plans  - Assess nutrition and hydration status and recommend course of action  - Evaluate amount of meals eaten  - Assist patient with eating if necessary   - Allow adequate time for meals  - Recommend/ encourage appropriate diets, oral nutritional supplements, and vitamin/mineral supplements  - Order, calculate, and assess calorie counts as needed  - Recommend, monitor, and adjust tube feedings and TPN/PPN based on assessed needs  - Assess need for intravenous fluids  - Provide specific nutrition/hydration education as appropriate  - Include patient/family/caregiver in decisions related to nutrition  Outcome: Progressing     Problem: MOBILITY - ADULT  Goal: Maintain or return to baseline ADL function  Description: INTERVENTIONS:  -  Assess patient's ability to carry out ADLs; assess patient's baseline for ADL function and identify physical deficits which impact ability to perform ADLs (bathing, care of mouth/teeth, toileting, grooming, dressing, etc )  - Assess/evaluate cause of self-care deficits   - Assess range of motion  - Assess patient's mobility; develop plan if impaired  - Assess patient's need for assistive devices and provide as appropriate  - Encourage maximum independence but intervene and supervise when necessary  - Involve family in performance of ADLs  - Assess for home care needs following discharge   - Consider OT consult to assist with ADL evaluation and planning for discharge  - Provide patient education as appropriate  Outcome: Progressing  Goal: Maintains/Returns to pre admission functional level  Description: INTERVENTIONS:  - Perform BMAT or MOVE assessment daily    - Set and communicate daily mobility goal to care team and patient/family/caregiver  - Collaborate with rehabilitation services on mobility goals if consulted  - Perform Range of Motion 3 times a day  - Reposition patient every 2 hours    - Dangle patient 3 times a day  - Stand patient 3 times a day  - Ambulate patient 3 times a day  - Out of bed to chair 3 times a day   - Out of bed for meals 3 times a day  - Out of bed for toileting  - Record patient progress and toleration of activity level   Outcome: Progressing

## 2023-01-08 NOTE — PROGRESS NOTES
[facility] Occupational Therapy Daily Note  Patient Active Problem List   Diagnosis    Type 2 diabetes mellitus without complication, with long-term current use of insulin ([facility] )    Benign essential hypertension    Mixed hyperlipidemia    Erectile dysfunction    Low testosterone    Obesity (BMI 30-39  9)    Testicular hypogonadism    Incarcerated umbilical hernia    Left ureteral calculus    Type 2 diabetes mellitus with hyperlipidemia (HCC)    Colonic mass    Microcytic anemia    Hypokalemia    Transaminitis    Thrombocytosis    Iron deficiency anemia, unspecified    Malignant neoplasm of transverse colon (HCC)    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)    Colon cancer metastasized to liver (HCC)    Colostomy prolapse (HCC)    Other fatigue    Cervical radiculopathy    Encephalopathy    Flash pulmonary edema (HCC)    MR (mitral regurgitation)    Bacteremia    ESBL (extended spectrum beta-lactamase) producing bacteria infection    Acute respiratory failure with hypoxia (HCC)    Severe protein-calorie malnutrition (HCC)    Acute on chronic kidney failure (HCC)    Sepsis (HCC)    Abscess    Acute pain    Leukocytosis    Dehiscence of incision    Elevated alkaline phosphatase level       Past Medical History:   Diagnosis Date    Abdominal pain 03/12/2022    Acute renal failure ([facility] )     [id number] resolved    Cancer ([place] [address] )     Diabetes mellitus ([facility] [address] )     Enteritis 08/23/2016    Gastroparesis due to DM ([facility] ) 08/23/2016    GERD (gastroesophageal reflux disease)     Hernia, ventral 08/04/2016    Hyperlipidemia     Hypertension     Morbid obesity ([facility] ) 04/17/2018    Postoperative visit 03/02/2022    SIRS (systemic inflammatory response syndrome) ([facility] ) 03/12/2022    Snoring     Stage 3a chronic kidney disease ([facility] ) 02/19/2022     Etiologic Diagnosis: Critical illness myopathy  Restrictions/Precautions  Precautions: Fall Risk, Contact/isolation, Multiple lines (VICTORINO drain, ileostomy, IR drain)  Weight Bearing Restrictions: No  ROM Restrictions: No  ADL Team Goal: Patient will require assist with ADLs with least restrictive device upon completion of rehab program  Occupational Therapy LTG's  Eating Oral care Bathing LB dress UB dress   Eating Goal: 06  Independent - Patient completes the activity by him/herself with no assistance from a helper  Oral Hygiene Goal: 06  Independent - Patient completes the activity by him/herself with no assistance from a helper  Shower/bathe self Goal: 04  Supervision or touching assistance- [place] provides VERBAL CUES or supervision throughout activity  Lower body dressing Goal: 04  Supervision or touching assistance- [place] provides VERBAL CUES or supervision throughout activity  Upper body dressing Goal: 04  Supervision or touching assistance- [place] provides VERBAL CUES or supervision throughout activity  Toileting Toilet txf Func txf IADL Med    Toileting hygiene Goal: 04  Supervision or touching assistance- [place] provides VERBAL CUES or supervision throughout activity  Toilet transfer Goal: 04  Supervision or touching assistance- [place] provides VERBAL CUES or supervision throughout activity       Assist Level: Supervision   OT interventions: Treatment/Interventions: ADL retraining, Functional transfer training, Therapeutic exercise, Endurance training, Patient/family training, Equipment eval/education, Compensatory technique education  Discharge Plan:  OT Discharge Recommendation: Home with home health rehabilitation   DME: Equipment Recommended:  (RW, WC, SHower chair/tub bench(if applicable)),  ,       93/64/48 1030   Pain Assessment   Pain Assessment Tool 0-10   Pain Score No Pain   Lifestyle   Autonomy "they woke me up in the middle of the night to change my bag and all the wounds "   Oral Hygiene   Type of Assistance Needed Independent   Oral Hygiene CARE Score 6   Grooming   Findings hair washed at sink   Shower/Bathe Self   Type of Assistance Needed Supervision   Comment edu for line management with use of paper tape anchors when bathing so drains do not dangle  Shower/Bathe Self CARE Score 4   Upper Body Dressing   Type of Assistance Needed Supervision   Upper Body Dressing CARE Score 4   Lower Body Dressing   Type of Assistance Needed Supervision   Comment cross leg tech and use of LH reacher  cont to seek assistance at times when met with resistance from socks  pt encourged to utilize varying methods to complete  Lower Body Dressing CARE Score 4   Putting On/Taking Off Footwear   Type of Assistance Needed Set-up / clean-up; Adaptive equipment   Putting On/Taking Off Footwear CARE Score 5   Lying to Sitting on Side of Bed   Type of Assistance Needed Supervision   Lying to Sitting on Side of Bed CARE Score 4   Sit to Stand   Type of Assistance Needed Supervision   Sit to Stand CARE Score 4   Bed-Chair Transfer   Type of Assistance Needed Supervision   Comment RW   Chair/Bed-to-Chair Transfer CARE Score 4   Toileting Hygiene   Type of Assistance Needed Physical assistance   Physical Assistance Level 25% or less   Comment completes standing urinal use with grab bar  Toileting Hygiene CARE Score 3   Assessment   Treatment Assessment skilled OT session focusing on ADL participation  Pt demo improved abilty to complete tasks with set up and assitance for managing lines  COnt to req increased time to manage fatigue     Prognosis Fair   Plan   Progress Progressing toward goals   OT Therapy Minutes   OT Time In 1045   OT Time Out 1145   OT Total Time (minutes) 60   OT Mode of treatment - Individual (minutes) 60   OT Mode of treatment - Concurrent (minutes) 0   OT Mode of treatment - Group (minutes) 0   OT Mode of treatment - Co-treat (minutes) 0   OT Mode of Treatment - Total time(minutes) 60 minutes   OT Cumulative Minutes 2421

## 2023-01-08 NOTE — PROGRESS NOTES
01/08/23 0830   Pain Assessment   Pain Assessment Tool 0-10   Pain Score 4   Pain Location/Orientation Location: Back;Orientation: Lower   Pain Onset/Description Frequency: Intermittent   Effect of Pain on Daily Activities TOLERATED SESSION; IMPROVED WITH STRETCHING   Restrictions/Precautions   Precautions Contact/isolation; Fall Risk;Multiple lines  (VICTORINO drain, ileostomy, IR drain)   Cognition   Overall Cognitive Status WFL   Comments Patient highly emotional this session after finding out he weighed 189 4 lbs which is appx a 100 lb weight loss since diagnosis  Difficult to redirect to task, needed time throughout session   Subjective   Subjective "they woke me up to change my ostomy bag last night cause it was leaking  I barely got any sleep  I am so tired   Sit to Lying   Type of Assistance Needed Supervision; Adaptive equipment   Sit to Lying CARE Score 4   Lying to Sitting on Side of Bed   Type of Assistance Needed Supervision; Adaptive equipment   Lying to Sitting on Side of Bed CARE Score 4   Sit to Stand   Type of Assistance Needed Supervision   Physical Assistance Level No physical assistance   Comment CS with RW   Sit to Stand CARE Score 4   Bed-Chair Transfer   Type of Assistance Needed Supervision   Physical Assistance Level No physical assistance   Comment CS with RW for SPT   Chair/Bed-to-Chair Transfer CARE Score 4   Transfer Bed/Chair/Wheelchair   Limitations Noted In Endurance   Adaptive Equipment Roller Walker   Walk 10 Feet   Type of Assistance Needed Supervision; Adaptive equipment   Physical Assistance Level No physical assistance   Comment needed to sit 2* dizziness   Walk 10 Feet CARE Score 4   Ambulation   Primary Mode of Locomotion Prior to Admission Walk   Distance Walked (feet) 10 ft   Assist Device Roller Walker   Gait Pattern Decreased foot clearance   Limitations Noted In Balance; Endurance;Speed;Strength;Swing   Does the patient walk? 2   Yes   Wheel 50 Feet with Two Turns   Type of Assistance Needed Supervision   Physical Assistance Level No physical assistance   Wheel 50 Feet with Two Turns CARE Score 4   Wheel 150 Feet   Type of Assistance Needed Supervision   Physical Assistance Level No physical assistance   Wheel 150 Feet CARE Score 4   Therapeutic Interventions   Flexibility BLE heel cord and HS gentle stretch TERT 4 minutes; RLE piriformis stretch TERT 2 minutes   Equipment Use   NuStep L1 x 5 minutes BLE   Assessment   Treatment Assessment Patient did not sleep well over night but was willing to participate in PT session with some encouragement  He was down this session about not sleeping due to a leaking ostomy bag, his delayed discharge due to equipment and home care service issues, and his weight loss  PCA asked PT to obtain weight on standing scale which was performed mid session, this derailed the plan for the session as patient very tearful and needing supportive talk and listening  He states loosing appx 100 lbs since his diagnosis which was very overwhelming for him  Later PT session to attempt more focus on strength and balance  HEP previously provided  Discharge to occur when services are able to be set up     Problem List Decreased strength;Decreased endurance;Decreased skin integrity;Orthopedic restrictions;Pain;Decreased mobility   Recommendation   PT Discharge Recommendation Home with home health rehabilitation   PT Therapy Minutes   PT Time In 0830   PT Time Out 0940   PT Total Time (minutes) 70   PT Mode of treatment - Individual (minutes) 70   PT Mode of treatment - Concurrent (minutes) 0   PT Mode of treatment - Group (minutes) 0   PT Mode of treatment - Co-treat (minutes) 0   PT Mode of Treatment - Total time(minutes) 70 minutes   PT Cumulative Minutes 9337

## 2023-01-08 NOTE — PROGRESS NOTES
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  • s/p hepatic resection and reversal of colostomy on 11/7  • s/p right hemicolectomy with partial descending colectomy colocolonic anastomosis with loop ileostomy and mid line abdominal VAC placement 11/16  • Continue local wound care; WC following  • CT  Abd/pelvis 12/17 = loculated collection along splenic recess   Has perisplenic hilum collection as well --> to IR 12/20 for drain placement in both perigastric and perisplenic area and cx sent from both areas = Ecoli  • Flush drains with 10ml qd; drained 15/20/17 on 1/5  • S-O following/Dr Netta Mckenzie was in to see pt 1/2/23  • Tube check (incl perc alvina tube) done 1/3 = unchanged with still some fluid around each drain so kept in  Next tube check 2 weeks @1/17  • Bloody drainage in VICTORINO #3 and IR in to see pt and felt 2/2 tube manipulation when he went for his tube check     Acute cholecystitis  • s/p percutaneous tube placement 12/8 with tube check on 12/12, 12/14  • GI saw due to alk phos elevation = felt 2/2 infiltrative disease rather than focal biliary obstruction   AMA was negative     • Alkaline phosphatase isoenzyme sent 12/17 (48% liver/52% bone) = GI  recommended continuing to monitor his LFTs and defer to surgical oncology for further w/u and plan   Last 3 APs were [phone]  Ida To recheck 1/3    • TB improving = to consider MRCP if total bili trends back up but was normal 12/30  • Alvina tube = 10 ml 1/5/23     • Drainage still has dark blood in it but seems to slowly becoming less     • Per GI, do cholangiogram with next tube check --> wasn't done 1/3 and GI aware  • GI wrote note 1/4 = aware labs incl GGT of 1992 (previously 9838 on 12/17)     Bacteremia/abdominal abscess  • ID following   • s/p Ertapenem was switched to Doxycycline per ID as of 12/30  • Blood cultures negative  • CT abdomen and pelvis as above  • + nausea since starting Doxy on 12/30   Lasted 1-2 hrs and was affecting appetite pt said   • Ertapenem completed      Hypertension  • On Norvasc 5mg BID  • Usually gets doses but held AM 1/4 for SBP 1/4     Diabetes mellitus  • Home:  Glargine 35U qam/Glyburide-Metformin 96314 qam/Januvia 100mg qam  • Here:  Lantus 10U qhs/Lispro 6U TID  Lispro to be held if pt does not eat  Consider metformin Monday  • Has a DEXCOM meter at home  • Continue DM diet and QID Accuchecks/SSI  • Eating/meal completion is erratic and therefore BSs difficult       Acute on chronic renal failure  • Stage III; baseline 1 2-1 4  • Lisinopril currently on hold  • s/p NSS IVF with improvement of creat =  back down to 1 2 12/30  • Stable at 1 1     Anemia  • Multifactorial due to recent infection, surgery, CKD stage III  • Transfused on 12/15, 12/16/22 and 12/30  • Hemoglobin 1/2/23 stable at 8 1     Atypical chest pain  • With elevation in troponin/EKG changes on acute side  • Cardiology saw then and cleared pt for discharge  • Did not recommend any further work up  • No further chest pain     Situational depression  • Neuropsychology consulted  • Patient not interested in medications at this point in time     Malnutrition  • Glucerna makes him nauseated  • On 12/15, ordered double protein     Pleural effusion  • CXR on 12/14 showed small left pleural effusion and was suspect for CHF  • ECHO 11/9/22 = LVEF 55%, unable to assess diastolic function, mild TR     L sided pleuritic pain  • located near drain site and per patient only occurs at night  • No evidence of infection        Discharge date:  1/9/23          The above assessment and plan was reviewed and updated as determined by my evaluation of the patient on 1/8/2023      Labs:   Results from last 7 days   Lab Units 01/06/23  8945 01/02/23  0602   WBC Thousand/uL 12 41* 12 67*   HEMOGLOBIN g/dL 8 2* 8 1*   HEMATOCRIT % 26 1* 26 9*   PLATELETS Thousands/uL 480* 620*     Results from last 7 days   Lab Units 01/06/23  0618 01/03/23  0635   SODIUM mmol/L 135 136   POTASSIUM mmol/L 3 7 4 1   CHLORIDE mmol/L 107 108   CO2 mmol/L 20* 19*   BUN mg/dL 20 20   CREATININE mg/dL 1 13 1 30   CALCIUM mg/dL 8 7 8 5             Results from last 7 days   Lab Units 01/08/23  0628 01/07/23  2111 01/07/23  1552   POC GLUCOSE mg/dl 123 106 104       Review of Scheduled Meds:  Current Facility-Administered Medications   Medication Dose Route Frequency Provider Last Rate   • acetaminophen  975 mg Oral Q8H [facility] GAURANG Keith     • amLODIPine  5 mg Oral BID GAURANG Keith     • aspirin  81 mg Oral Daily GAURANG Keith     • atorvastatin  40 mg Oral Daily GAURANG Keith     • calcium carbonate  500 mg Oral BID PRN GAURANG Lanier     • cholecalciferol  400 Units Oral Daily GAURANG Keith     • collagenase   Topical Daily Lula Alvarez MD     • heparin (porcine)  5,000 Units Subcutaneous Q8H [facility] GAURANG Keith     • insulin glargine  10 Units Subcutaneous HS KortneyGAURANG Young     • insulin lispro  1-5 Units Subcutaneous HS GAURANG Keith     • insulin lispro  1-6 Units Subcutaneous TID AC GAURANG Keith     • insulin lispro  6 Units Subcutaneous TID With Meals GAURANG Diggs     • iohexol  50 mL Intravenous Once in imaging Janine Kwong MD     • melatonin  3 mg Oral HS GAURANG Keith     • methocarbamol  500 mg Oral BID GAURANG Lanier     • multivitamin-minerals  1 tablet Oral Daily GAURANG Keith     • ondansetron  4 mg Oral Q6H PRN GAURANG Keith     • ondansetron  4 mg Oral BID GAURANG Henderson     • oxyCODONE  10 mg Oral Q6H PRN GAURANG Keith     • oxyCODONE  2 5 mg Oral Q3H PRN GAURANG Keith     • oxyCODONE  5 mg Oral Q6H PRN GAURANG Lanier • pantoprazole  40 mg Oral BID AC GAURANG Keith     • simethicone  80 mg Oral 4x Daily (with meals and at bedtime) GAURANG Alford     • tamsulosin  0 4 mg Oral Daily With Dinner GAURANG Alford         Subjective/ HPI: Patient seen and examined  Patients overnight issues or events were reviewed with nursing or staff during rounds or morning huddle session  New or overnight issues include the following:     Pt seen in his room  He states that he is doing well and denies complaints  ROS:   A 10 point ROS was performed; negative except as noted above  Imaging:     IR drainage tube check and/or removal   Final Result by Ulises Hilario MD (01/04 4711)   Impression:   1  Drain check demonstrated all 3 drains to be patent  No fistula to the bowel  There are still small collections around each drain and therefore the drains were left in place  PLAN: Tubes to bag drainage  Return in 2 weeks for tube checks  Workstation performed: [id number]         IR drainage tube placement   Final Result by Maki Bush MD (12/20 2003)      Percutaneous placement of abscess drainage catheters into the perigastric and perisplenic abscesses using 10-Thai catheters  Plan:       - Catheters to bulb suction drainage    - Flush catheters with 10 mL normal saline daily    - Return in 2 weeks for drain check  Workstation performed: [id number]         CT abdomen pelvis w contrast   Final Result by Mukesh Serrano MD (99/67 [id number])      Status post cholecystostomy tube placement with decompression of the gallbladder  In addition, a catheter placed adjacent to the cut edge of the liver is increased position with near complete resolution of fluid collection  Loculated collection along the splenic recess of the lesser sac is unchanged in size  Additional perisplenic collection in the hilum and along the inferior pole are unchanged        Collection of fluid and gas in the left upper quadrant adjacent to left kidney also not significantly changed  No extravasated oral contrast             Workstation performed: [id number]         IR drainage tube check/change/reposition/reinsertion/upsize    (Results Pending)   IR cholecystostomy tube check and/or removal    (Results Pending)       *Labs /Radiology studies reviewed  *Medications reviewed and reconciled as needed  *Please refer to order section for additional ordered labs studies  *Case discussed with primary attending during morning huddle case rounds    Physical Examination:  Vitals:   Vitals:    01/07/23 1727 01/07/23 2038 01/08/23 0600 01/08/23 0821   BP: 116/72 140/79 131/84 116/74   BP Location:  Right arm Right arm    Pulse:  96 75    Resp:  18 16    Temp:  99 3 °F (37 4 °C) 97 7 °F (36 5 °C)    TempSrc:  Oral Oral    SpO2:  98% 99%    Weight:       Height:           General Appearance: no distress, conversive  HEENT:  External ear normal   Nose normal w/o drainage  Mucous membranes are moist  Oropharynx is clear  Conjunctiva clear w/o icterus or redness  Neck:  Supple, normal ROM  Lungs: BBS without crackles/wheeze/rhonchi; respirations unlabored with normal inspiratory/expiratory effort  No retractions noted  On RA  CV: regular rate and rhythm; no rubs/murmurs/gallops, PMI normal   ABD: Abdomen is soft  Bowel sounds all quadrants  Nontender with no distention  Drain labeled "back" is still with small amt bloody drained and the other 2 with tan purulent  Perc ardha tube with dark bloody drainage  EXT: No edema  Skin: normal turgor, normal texture, no rashes  Psych: affect normal, mood normal  Neuro: AAO      The above physical exam was reviewed and updated as determined by my evaluation of the patient on 1/8/2023      Invasive Devices     Central Venous Catheter Line  Duration           Port A Cath 03/10/22 Right Chest 303 days          Drain  Duration           Ileostomy LUQ 51 days    Abscess Drain Abdomen 30 days Cholecystostomy Tube 26 days    Abscess Drain Abdomen 18 days    Abscess Drain Back 18 days                   VTE Pharmacologic Prophylaxis: Heparin  Code Status: Level 1 - Full Code  Current Length of Stay: 25 day(s)      Total time spent:  30 minutes with more than 50% spent counseling/coordinating care  Counseling includes discussion with patient re: progress  and discussion with patient of his/her current medical state/information  Coordination of patient's care was performed in conjunction with primary service  Time invested included review of patient's labs, vitals, and management of their comorbidities with continued monitoring  In addition, this patient was discussed with medical team including physician and advanced extenders  The care of the patient was extensively discussed and appropriate treatment plan was formulated unique for this patient  Medical decision making for the day was made by supervising physician unless otherwise noted in their attestation statement  ** Please Note:  voice to text software may have been used in the creation of this document   Although proof errors in transcription or interpretation are a potential of such software**

## 2023-01-08 NOTE — PROGRESS NOTES
01/08/23 1230   Pain Assessment   Pain Assessment Tool 0-10   Pain Score 5   Pain Location/Orientation Location: Abdomen   Restrictions/Precautions   Precautions Fall Risk;Contact/isolation;Pain;Multiple lines  (VICTORINO drain, ileostomy, IR drain)   Subjective   Subjective pt agreeable to perform skilled PT   Roll Left and Right   Type of Assistance Needed Physical assistance   Physical Assistance Level 25% or less   Roll Left and Right CARE Score 3   Sit to Lying   Type of Assistance Needed Supervision   Sit to Lying CARE Score 4   Lying to Sitting on Side of Bed   Type of Assistance Needed Supervision   Lying to Sitting on Side of Bed CARE Score 4   Sit to Stand   Type of Assistance Needed Supervision   Sit to Stand CARE Score 4   Bed-Chair Transfer   Type of Assistance Needed Supervision   Comment RW   Chair/Bed-to-Chair Transfer CARE Score 4   Transfer Bed/Chair/Wheelchair   Adaptive Equipment Roller Walker   Walk 10 Feet   Type of Assistance Needed Supervision; Adaptive equipment   Comment RW   Walk 10 Feet CARE Score 4   Walk 150 Feet   Reason if not Attempted Safety concerns   Walk 150 Feet CARE Score 88   Walking 10 Feet on Uneven Surfaces   Reason if not Attempted Safety concerns   Walking 10 Feet on Uneven Surfaces CARE Score 88   Ambulation   Primary Mode of Locomotion Prior to Admission Walk   Distance Walked (feet) 15 ft  (x2)   Assist Device Roller Walker   Does the patient walk? 2  Yes   Wheel 50 Feet with Two Turns   Type of Assistance Needed Supervision   Wheel 50 Feet with Two Turns CARE Score 4   Wheel 150 Feet   Type of Assistance Needed Supervision   Wheel 150 Feet CARE Score 4   Wheelchair mobility   Does the patient use a wheelchair? 1  Yes   Type of Wheelchair Used 1  Manual   Method Right upper extremity; Left upper extremity   Distance Level Surface (feet) 150 ft   Curb or Single Stair   Style negotiated Single stair   Type of Assistance Needed Incidental touching   1 Step (Curb) CARE Score 4 4 Steps   Type of Assistance Needed Incidental touching   Comment BHR 6 inch  steps   4 Steps CARE Score 4   Picking Up Object   Type of Assistance Needed Supervision; Adaptive equipment   Comment w reacher for markers   Picking Up Object CARE Score 4   Therapeutic Interventions   Strengthening LAQ AP marching 10-15 reps STS x5   Flexibility BLE heel stretch manual   Assessment   Treatment Assessment pt engaged in skilled PT focus on bed mobility S lvl overall and xfers improving with S lvl with RW , pt S progress indep lvl WC propl   however , pt cont to be limited during ambulation with RW d/t deconditioning and  -140 at times with inc distance , Pt good at 10-15 feet before HR inc but pt needs to be encouraged to inc gait distance if possible with WC  follow , Pt cont to try improving distance but pain in abdmonial or fatigue dec activities  Pt will cont toward DC goals and awaiting DME's for home DC   Barriers to Discharge Inaccessible home environment;Decreased caregiver support   Plan   Progress Progressing toward goals   Recommendation   PT Discharge Recommendation Home with home health rehabilitation   PT Therapy Minutes   PT Time In 1230   PT Time Out 1330   PT Total Time (minutes) 60   PT Mode of treatment - Individual (minutes) 60   PT Mode of treatment - Concurrent (minutes) 0   PT Mode of treatment - Group (minutes) 0   PT Mode of treatment - Co-treat (minutes) 0   PT Mode of Treatment - Total time(minutes) 60 minutes   PT Cumulative Minutes 1635   Therapy Time missed   Time missed?  No

## 2023-01-09 ENCOUNTER — TELEPHONE (OUTPATIENT)
Dept: HEMATOLOGY ONCOLOGY | Facility: CLINIC | Age: 58
End: 2023-01-09

## 2023-01-09 PROBLEM — K63.89 COLONIC MASS: Status: RESOLVED | Noted: 2022-02-18 | Resolved: 2023-01-09

## 2023-01-09 PROBLEM — E87.6 HYPOKALEMIA: Status: RESOLVED | Noted: 2022-02-19 | Resolved: 2023-01-09

## 2023-01-09 LAB
ALBUMIN SERPL BCP-MCNC: 1.7 G/DL (ref 3.5–5)
ALP SERPL-CCNC: 1120 U/L (ref 46–116)
ALT SERPL W P-5'-P-CCNC: 13 U/L (ref 12–78)
ANION GAP SERPL CALCULATED.3IONS-SCNC: 7 MMOL/L (ref 4–13)
AST SERPL W P-5'-P-CCNC: 45 U/L (ref 5–45)
BASOPHILS # BLD AUTO: 0.07 THOUSANDS/ÂΜL (ref 0–0.1)
BASOPHILS NFR BLD AUTO: 1 % (ref 0–1)
BILIRUB SERPL-MCNC: 0.64 MG/DL (ref 0.2–1)
BUN SERPL-MCNC: 24 MG/DL (ref 5–25)
CALCIUM ALBUM COR SERPL-MCNC: 10.3 MG/DL (ref 8.3–10.1)
CALCIUM SERPL-MCNC: 8.5 MG/DL (ref 8.3–10.1)
CHLORIDE SERPL-SCNC: 109 MMOL/L (ref 96–108)
CO2 SERPL-SCNC: 20 MMOL/L (ref 21–32)
CREAT SERPL-MCNC: 1.32 MG/DL (ref 0.6–1.3)
DME PARACHUTE DELIVERY DATE REQUESTED: NORMAL
DME PARACHUTE DELIVERY DATE REQUESTED: NORMAL
DME PARACHUTE ITEM DESCRIPTION: NORMAL
DME PARACHUTE ORDER STATUS: NORMAL
DME PARACHUTE ORDER STATUS: NORMAL
DME PARACHUTE SUPPLIER NAME: NORMAL
DME PARACHUTE SUPPLIER NAME: NORMAL
DME PARACHUTE SUPPLIER PHONE: NORMAL
DME PARACHUTE SUPPLIER PHONE: NORMAL
EOSINOPHIL # BLD AUTO: 0 THOUSAND/ÂΜL (ref 0–0.61)
EOSINOPHIL NFR BLD AUTO: 0 % (ref 0–6)
ERYTHROCYTE [DISTWIDTH] IN BLOOD BY AUTOMATED COUNT: 17.9 % (ref 11.6–15.1)
GFR SERPL CREATININE-BSD FRML MDRD: 59 ML/MIN/1.73SQ M
GLUCOSE P FAST SERPL-MCNC: 138 MG/DL (ref 65–99)
GLUCOSE SERPL-MCNC: 130 MG/DL (ref 65–140)
GLUCOSE SERPL-MCNC: 133 MG/DL (ref 65–140)
GLUCOSE SERPL-MCNC: 138 MG/DL (ref 65–140)
GLUCOSE SERPL-MCNC: 138 MG/DL (ref 65–140)
GLUCOSE SERPL-MCNC: 216 MG/DL (ref 65–140)
HCT VFR BLD AUTO: 25.9 % (ref 36.5–49.3)
HGB BLD-MCNC: 8 G/DL (ref 12–17)
IMM GRANULOCYTES # BLD AUTO: 0.23 THOUSAND/UL (ref 0–0.2)
IMM GRANULOCYTES NFR BLD AUTO: 2 % (ref 0–2)
LYMPHOCYTES # BLD AUTO: 2.12 THOUSANDS/ÂΜL (ref 0.6–4.47)
LYMPHOCYTES NFR BLD AUTO: 16 % (ref 14–44)
MCH RBC QN AUTO: 25.9 PG (ref 26.8–34.3)
MCHC RBC AUTO-ENTMCNC: 30.9 G/DL (ref 31.4–37.4)
MCV RBC AUTO: 84 FL (ref 82–98)
MONOCYTES # BLD AUTO: 1.4 THOUSAND/ÂΜL (ref 0.17–1.22)
MONOCYTES NFR BLD AUTO: 11 % (ref 4–12)
NEUTROPHILS # BLD AUTO: 9.52 THOUSANDS/ÂΜL (ref 1.85–7.62)
NEUTS SEG NFR BLD AUTO: 70 % (ref 43–75)
NRBC BLD AUTO-RTO: 0 /100 WBCS
PLATELET # BLD AUTO: 430 THOUSANDS/UL (ref 149–390)
PMV BLD AUTO: 10.2 FL (ref 8.9–12.7)
POTASSIUM SERPL-SCNC: 4 MMOL/L (ref 3.5–5.3)
PROT SERPL-MCNC: 7.9 G/DL (ref 6.4–8.4)
RBC # BLD AUTO: 3.09 MILLION/UL (ref 3.88–5.62)
SODIUM SERPL-SCNC: 136 MMOL/L (ref 135–147)
WBC # BLD AUTO: 13.34 THOUSAND/UL (ref 4.31–10.16)

## 2023-01-09 PROCEDURE — 0HD7XZZ EXTRACTION OF ABDOMEN SKIN, EXTERNAL APPROACH: ICD-10-PCS | Performed by: SURGERY

## 2023-01-09 RX ADMIN — SIMETHICONE 80 MG: 80 TABLET, CHEWABLE ORAL at 22:23

## 2023-01-09 RX ADMIN — ONDANSETRON 4 MG: 4 TABLET, ORALLY DISINTEGRATING ORAL at 22:23

## 2023-01-09 RX ADMIN — OXYCODONE HYDROCHLORIDE 10 MG: 10 TABLET ORAL at 23:58

## 2023-01-09 RX ADMIN — ACETAMINOPHEN 975 MG: 325 TABLET, FILM COATED ORAL at 05:31

## 2023-01-09 RX ADMIN — ASPIRIN 81 MG CHEWABLE TABLET 81 MG: 81 TABLET CHEWABLE at 08:00

## 2023-01-09 RX ADMIN — INSULIN LISPRO 6 UNITS: 100 INJECTION, SOLUTION INTRAVENOUS; SUBCUTANEOUS at 17:23

## 2023-01-09 RX ADMIN — PANTOPRAZOLE SODIUM 40 MG: 40 TABLET, DELAYED RELEASE ORAL at 06:13

## 2023-01-09 RX ADMIN — CHOLECALCIFEROL TAB 10 MCG (400 UNIT) 400 UNITS: 10 TAB at 08:00

## 2023-01-09 RX ADMIN — ACETAMINOPHEN 975 MG: 325 TABLET, FILM COATED ORAL at 22:23

## 2023-01-09 RX ADMIN — COLLAGENASE SANTYL: 250 OINTMENT TOPICAL at 12:57

## 2023-01-09 RX ADMIN — METHOCARBAMOL 500 MG: 500 TABLET ORAL at 17:21

## 2023-01-09 RX ADMIN — Medication 1 TABLET: at 08:00

## 2023-01-09 RX ADMIN — SIMETHICONE 80 MG: 80 TABLET, CHEWABLE ORAL at 17:21

## 2023-01-09 RX ADMIN — HEPARIN SODIUM 5000 UNITS: 5000 INJECTION INTRAVENOUS; SUBCUTANEOUS at 22:23

## 2023-01-09 RX ADMIN — TAMSULOSIN HYDROCHLORIDE 0.4 MG: 0.4 CAPSULE ORAL at 17:21

## 2023-01-09 RX ADMIN — INSULIN LISPRO 2 UNITS: 100 INJECTION, SOLUTION INTRAVENOUS; SUBCUTANEOUS at 12:56

## 2023-01-09 RX ADMIN — SIMETHICONE 80 MG: 80 TABLET, CHEWABLE ORAL at 12:56

## 2023-01-09 RX ADMIN — ACETAMINOPHEN 975 MG: 325 TABLET, FILM COATED ORAL at 14:54

## 2023-01-09 RX ADMIN — HEPARIN SODIUM 5000 UNITS: 5000 INJECTION INTRAVENOUS; SUBCUTANEOUS at 05:31

## 2023-01-09 RX ADMIN — OXYCODONE HYDROCHLORIDE 10 MG: 10 TABLET ORAL at 17:27

## 2023-01-09 RX ADMIN — METHOCARBAMOL 500 MG: 500 TABLET ORAL at 08:00

## 2023-01-09 RX ADMIN — PANTOPRAZOLE SODIUM 40 MG: 40 TABLET, DELAYED RELEASE ORAL at 17:21

## 2023-01-09 RX ADMIN — INSULIN LISPRO 6 UNITS: 100 INJECTION, SOLUTION INTRAVENOUS; SUBCUTANEOUS at 07:57

## 2023-01-09 RX ADMIN — SIMETHICONE 80 MG: 80 TABLET, CHEWABLE ORAL at 07:58

## 2023-01-09 RX ADMIN — ATORVASTATIN CALCIUM 40 MG: 40 TABLET, FILM COATED ORAL at 08:00

## 2023-01-09 RX ADMIN — MELATONIN TAB 3 MG 3 MG: 3 TAB at 22:23

## 2023-01-09 RX ADMIN — HEPARIN SODIUM 5000 UNITS: 5000 INJECTION INTRAVENOUS; SUBCUTANEOUS at 14:54

## 2023-01-09 RX ADMIN — AMLODIPINE BESYLATE 5 MG: 5 TABLET ORAL at 08:00

## 2023-01-09 RX ADMIN — INSULIN GLARGINE 10 UNITS: 100 INJECTION, SOLUTION SUBCUTANEOUS at 22:23

## 2023-01-09 RX ADMIN — AMLODIPINE BESYLATE 5 MG: 5 TABLET ORAL at 17:21

## 2023-01-09 RX ADMIN — INSULIN LISPRO 6 UNITS: 100 INJECTION, SOLUTION INTRAVENOUS; SUBCUTANEOUS at 12:56

## 2023-01-09 NOTE — PROGRESS NOTES
PM&R PROGRESS NOTE:  Hillary Burroughs 61 y o  male MRN: [id number]  Unit/Bed#: -30 Encounter: [id number]        Rehabilitation Diagnosis: Impairment of mobility, safety and Activities of Daily Living (ADLs) due to Neurologic Conditions:  03 8  Neuromuscular Disorders    HPI: Ana M Jimenez is a 61 y o  male with a medical history of HTN, HLD, GERD, and ventral hernia that presented to [facility] on 11/7 for an elective surgical procedure d/t metastatic colon adenocarcinoma by Dr Underwood November  Patient presented to hem/onc 2/22 after CT abdomen showed transverse colonic apple core lesion and innumerable hepatic metastases  MRI revealed multiple hepatic metastasis with smaller lesions in left lobe and larger lesions in right lobe  Patient completed chemotherapy, colostomy at that time  On 11/7 patient underwent exploratory laparotomy, segment 3 liver resection, ablation, intraoperative ultrasound, and colostomy reversal  This was c/b left upper quadrant hematoma, imaging showed suspected leak from transverse colon anastomosis  On 11/16, patient underwent exp lap which revealed LUQ infected hematoma, defect in transverse colon anastomosis with extravasation of succus  Massively dilated cecum requiring resection  He had a right hemicolectomy, left in discontinuity and temporary abdominal closure  On 11/17, re-exploration, partial descending colectomy, primary colonic anastomosis created with diverting loop ileostomy and midline wound VAC placement  He completed course of IV abx for ESBL bacteremia from abdominal abscess  Midline abdominal incision wet to dry dressings and packing to old stoma site  12/6 abdominal incision noted to have yellow drainage  CT abdomen showed abdominal abscess and distended gallbladder  Patient underwent percutaneous cholecystostomy tube insertion and hepatectomy abscess drainage  ID consulted, IV abx for 1 week  Nephrology consulted d/t ALEXY, creatinine baseline 1 2-1 4   On 12/14 patient experienced chest pain, appeared musculoskeletal with no further work-up  Patient deemed medically stable and admitted to [facility] on 12/14/2022  SUBJECTIVE: Patient seen face to face  Patient in good spirits  Reports pain adequate controlled on current regimen  Appetite improved  Denies nausea, vomiting, dizziness  Progressive as expected in rehabilitation  No CP, SOB, fever, chills, N/V, abdominal pain  ASSESSMENT: Stable, progressing    PLAN:  1  Nausea improved, continue current diet and medication regimen  2  Pain well controlled, no changes  3  VICTORINO drains with minimal drainage, continue to monitor  Rehabilitation  • Functional deficits:  Decreased foot clearance, shuffle gait  • Continue current rehabilitation plan of care to maximize function  • Functional update:   · PT: mobility-supervision-min A, transfers- supervision, ambulation- supervision 250' RW, w/c mobility- supervision 150'  · OT:  ADL- supervision, toilet hygiene min A   · Estimated Discharge: TBD with home PT, OT, RN    Pain  • Tylenol, oxycodone    DVT prophylaxis  • Heparin sc    Bladder plan  • Continent    Bowel plan  • Ileostomy     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 liver [facility])  Assessment & Plan  · MRI-multiple hepatic metastasis; smaller lesions in left lobe, larger lesions in right lobe  · 11/7 Exp Lap, resection of hepatic segment 3, resection of transverse colon with reversal of loop colostomy (Dr Brody Haddad)  · 11/16- right hemicolectomy, left discontinuity and temporary abdominal closure device placed  · 11/17- re-exploration, partial descending colectomy, primary colocolonic anastomosis with diverting loop ileostomy, midline VAC placement  · CT showed abdominal abscess and distended gallbladder  · 12/8- IR percutaneous cholecystostomy tube, hepatectomy abscess drain placement  ·  IR on 12/20 additional drains placed for a perisplenic abscess as well as a splenic recess abscess  · Total of 3 drains in place  * Malignant neoplasm of transverse colon [facility])  Assessment & Plan  · Transverse colonic apple core lesion and innumerable hepatic metastases  · S/p Colostomy placement 2/22  · RCW port-a-cath, accessed  · S/p palliative chemo  · Follows with Dr Felipe Dozier (palliative)  · Follows hem/onc (Dr Ac Siegel)    Elevated alkaline phosphatase level  Assessment & Plan  1025, (previous 1315)  Hepatic mets  Follow level  ?bone involvement - no evidence of ROM limitations or pain complaints on survey  Follow up with Surg Onc    Dehiscence of incision  Assessment & Plan  Improving  Wound Care following and surgical oncology following   Slough present  [place] ordered 12/29/22  Surgical oncology- no signs of infection, recommend [place], ABD, paper tape     [address] as follows:   Skin Care Plan:  1-Midline Incision and Right Abdomen Wounds: Cleanse wounds with NS and pat dry  Apply Santyl to slough tissue on wound bed nickel thick  Cover with ABD and secure with minimal tape  Change daily or PRN soilage or displacement  2-Turn/reposition q2h or when medically stable for pressure re-distribution on skin   3-Elevate heels to offload pressure  4-Moisturize skin daily with skin nourishing cream  5-Ehob cushion in chair when out of bed  6-Preventative Hydraguard to bilateral heels and bilateral sacro-buttocks BID and PRN          Leukocytosis  Assessment & Plan  WBC 13 34 (previous 12 41)    Acute pain  Assessment & Plan  Managed on Scheduled Tylenol 975 mg [id number]  Managed on Robaxin 500 mg BId  Oxycodone IR PRN  Moderately managed     Abscess  Assessment & Plan  · Abdominal abscess- ESBL E Coli - Contact precautions  · Zosyn completed  · Completed Ertapenem course 12/28/22  · Starting Doxycycline 100 mg Q12h 12/30/22 per ID consultants    · Doxycycline stopped, Ertapenem restarted complete 01/07    · 3 drains placed by IR  · Monitor 3 drain output:  · Abscess drain abdomen A: 0 cc   · Abscess drain back: 0-10 cc  · Abscess drain abdomen B: 15 - 20cc    1/3- tube check w/ IR  Tube position check  Patient having some discomfort in left abdomen while supine - per IR tubes in place, bulbs changed, recheck tube placement in 2 weeks  1/4- drain #3 developed sanguineous drainage with flushing, IR evaluated - flushing appropriately  Continue daily flushes on all the drains and monitor output  Reach out to IR if drainage is <10 cc per day for 2 days  Sepsis ([address] )  Assessment & Plan  · SIRS versus sepsis at this time given his vitals, leukocytosis, and complicated infection history  · Mgmt per ID, IM  · 12/20-IR perisplenic, perigastric drain placement for 2 additional abscesses-  · Perigastric abscess +E  Coli ESBL  · Blood cultures negative     Acute on chronic kidney failure [facility])  Assessment & Plan  · Creatinine baseline 1 2-1 4  · Cr = 1 3  · Monitor BMP    Bacteremia  Assessment & Plan  · Remains on Abx     Iron deficiency anemia, unspecified  Assessment & Plan  · Hbg = 8 0  · 12/15-1 unit PRBc  · 12/16- symptomatic- 1 unit PRBc  · 12/28- 1 unit PRBc  · Monitor CBC    Obesity (BMI 30-39  9)  Assessment & Plan  · BMI 33 0    Benign essential hypertension  Assessment & Plan  · Home: Norvasc 5 mg BID  · Here: Norvasc 5 mg BID  · Adjust medication as needed    Type 2 diabetes mellitus without complication, with long-term current use of insulin (HCC)  Assessment & Plan  · HbgA1c 8 0 (9/22)  · Home monitoring with Bret Son  · Home: Lantus 12 units, Humalog 3 units with meals, Januvia  · Here: Lantus 10 units,  Humalog 6 units with meals, SSI   · Follow with PCP (Dr Christiano Tavares)      [address] consultants medical co-management  Labs, medications, and imaging reviewed  ROS:  Review of Systems   A 10 point review of systems was negative except for what is noted in the HPI      OBJECTIVE:   /76 (BP Location: Right arm)   Pulse 80   Temp 97 5 °F (36 4 °C) (Oral)   Resp 16   Ht 5' 10" (1 778 m)   Wt 85 9 kg (189 lb 6 4 oz)   SpO2 98% BMI 27 18 kg/m²     Physical Exam  Constitutional:       Appearance: Normal appearance  HENT:      Head: Normocephalic and atraumatic  Mouth/Throat:      Mouth: Mucous membranes are moist    Cardiovascular:      Rate and Rhythm: Normal rate and regular rhythm  Pulses: Normal pulses  Heart sounds: Normal heart sounds  Pulmonary:      Effort: Pulmonary effort is normal       Breath sounds: Normal breath sounds  Abdominal:      General: Bowel sounds are normal       Palpations: Abdomen is soft  Comments: +ileostomy, + 3 VICTORINO drains, + perc  radha   Musculoskeletal:         General: Normal range of motion  Cervical back: Normal range of motion  Skin:     General: Skin is warm and dry  Capillary Refill: Capillary refill takes less than 2 seconds  Comments: Abdominal incision- superior slough   Neurological:      Mental Status: He is alert and oriented to person, place, and time     Psychiatric:         Mood and Affect: Mood normal          Judgment: Judgment normal         Lab Results   Component Value Date    WBC 13 34 (H) 01/09/2023    HGB 8 0 (L) 01/09/2023    HCT 25 9 (L) 01/09/2023    MCV 84 01/09/2023     (H) 01/09/2023     Lab Results   Component Value Date    SODIUM 136 01/09/2023    K 4 0 01/09/2023     (H) 01/09/2023    CO2 20 (L) 01/09/2023    BUN 24 01/09/2023    CREATININE 1 32 (H) 01/09/2023    GLUC 138 01/09/2023    CALCIUM 8 5 01/09/2023     Lab Results   Component Value Date    INR 1 12 12/19/2022    INR 1 10 11/12/2022    INR 1 24 (H) 11/09/2022    PROTIME 14 7 (H) 12/19/2022    PROTIME 14 4 11/12/2022    PROTIME 15 8 (H) 11/09/2022       Current Facility-Administered Medications:   •  acetaminophen (TYLENOL) tablet 975 mg, 975 mg, Oral, Q8H [address], GAURANG Keith, 975 mg at 01/09/23 0531  •  amLODIPine (NORVASC) tablet 5 mg, 5 mg, Oral, BID, GAURANG Keith, 5 mg at 01/09/23 0800  •  aspirin chewable tablet 81 mg, 81 mg, Oral, Daily, Jenelle GAURANG Martin, 81 mg at 01/09/23 0800  •  atorvastatin (LIPITOR) tablet 40 mg, 40 mg, Oral, Daily, GAURANG Keith, 40 mg at 01/09/23 0800  •  calcium carbonate (TUMS) chewable tablet 500 mg, 500 mg, Oral, BID PRN, GAURANG Keith, 500 mg at 01/06/23 1402  •  cholecalciferol (VITAMIN D3) tablet 400 Units, 400 Units, Oral, Daily, GAURANG Keith, 400 Units at 01/09/23 0800  •  collagenase (SANTYL) ointment, , Topical, Daily, Иван Coon MD, Given by Other at 01/08/23 1527  •  heparin (porcine) subcutaneous injection 5,000 Units, 5,000 Units, Subcutaneous, Q8H [address], GAURANG Keith, 5,000 Units at 01/09/23 0531  •  insulin glargine (LANTUS) subcutaneous injection 10 Units 0 1 mL, 10 Units, Subcutaneous, HS, GAURANG Todd, 10 Units at 01/08/23 2136  •  insulin lispro (HumaLOG) 100 units/mL subcutaneous injection 1-5 Units, 1-5 Units, Subcutaneous, HS, GAURANG Keith, 1 Units at 01/08/23 2141  •  insulin lispro (HumaLOG) 100 units/mL subcutaneous injection 1-6 Units, 1-6 Units, Subcutaneous, TID AC, 1 Units at 01/08/23 1705 **AND** Fingerstick Glucose (POCT), , , TID AC, GAURANG Keith  •  insulin lispro (HumaLOG) 100 units/mL subcutaneous injection 6 Units, 6 Units, Subcutaneous, TID With Meals, GAURANG Carpenter, 6 Units at 01/09/23 0757  •  iohexol (OMNIPAQUE) 350 MG/ML injection (SINGLE-DOSE) 50 mL, 50 mL, Intravenous, Once in imaging, Kraig Lucas MD  •  melatonin tablet 3 mg, 3 mg, Oral, HS, GAURANG Keith, 3 mg at 01/08/23 2135  •  methocarbamol (ROBAXIN) tablet 500 mg, 500 mg, Oral, BID, GAURANG Keith, 500 mg at 01/09/23 0800  •  multivitamin-minerals (CENTRUM) tablet 1 tablet, 1 tablet, Oral, Daily, GAURANG Keith, 1 tablet at 01/09/23 0800  •  ondansetron (ZOFRAN-ODT) dispersible tablet 4 mg, 4 mg, Oral, Q6H PRN, GAURANG Keith, 4 mg at 01/06/23 1402  •  ondansetron (ZOFRAN-ODT) dispersible tablet 4 mg, 4 mg, Oral, BID, [place] GAURANG Moura, 4 mg at 01/07/23 2000  •  oxyCODONE (ROXICODONE) immediate release tablet 10 mg, 10 mg, Oral, Q6H PRN, GAURANG Keith, 10 mg at 01/08/23 2135  •  oxyCODONE (ROXICODONE) IR tablet 2 5 mg, 2 5 mg, Oral, Q3H PRN, GAURANG Keith, 2 5 mg at 12/25/22 1911  •  oxyCODONE (ROXICODONE) IR tablet 5 mg, 5 mg, Oral, Q6H PRN, GAURANG Keith, 5 mg at 01/08/23 1331  •  pantoprazole (PROTONIX) EC tablet 40 mg, 40 mg, Oral, BID AC, GAURANG Keith, 40 mg at 01/09/23 7739  •  simethicone (MYLICON) chewable tablet 80 mg, 80 mg, Oral, 4x Daily (with meals and at bedtime), GAURANG Keith, 80 mg at 01/09/23 0758  •  tamsulosin (FLOMAX) capsule 0 4 mg, 0 4 mg, Oral, Daily With Dinner, Caroleen Alpers, CRNP, 0 4 mg at 01/08/23 1702    Past Medical History:   Diagnosis Date   • Abdominal pain 03/12/2022   • Acute renal failure ([place] [address] )     [id number] resolved   • Cancer ([place] [address] )    • Diabetes mellitus ([place] [address] )    • Enteritis 08/23/2016   • Gastroparesis due to DM ([place] [address] ) 08/23/2016   • GERD (gastroesophageal reflux disease)    • Hernia, ventral 08/04/2016   • Hyperlipidemia    • Hypertension    • Morbid obesity ([place] [address] ) 04/17/2018   • Postoperative visit 03/02/2022   • SIRS (systemic inflammatory response syndrome) ([place] [address] ) 03/12/2022   • Snoring    • Stage 3a chronic kidney disease ([place] [address] ) 02/19/2022       Patient Active Problem List    Diagnosis Date Noted   • 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 liver ([place] [address] ) 03/02/2022   • Malignant neoplasm of transverse colon ([place] [address] ) 03/01/2022   • Dehiscence of incision 12/30/2022   • Elevated alkaline phosphatase level 12/30/2022   • Leukocytosis 12/29/2022   • Abscess 12/27/2022   • Acute pain 12/27/2022   • Sepsis ([facility] ) 12/17/2022   • Severe protein-calorie malnutrition ([facility] ) 12/14/2022   • Acute on chronic kidney failure ([facility] ) 12/14/2022   • Flash pulmonary edema ([facility] ) 11/12/2022   • MR (mitral regurgitation) 11/12/2022   • Bacteremia 11/12/2022   • ESBL (extended spectrum beta-lactamase) producing bacteria infection 11/12/2022   • Acute respiratory failure with hypoxia ([place] [address] ) 11/12/2022   • Encephalopathy 11/09/2022   • Cervical radiculopathy 10/26/2022   • Other fatigue 06/15/2022   • Colostomy prolapse ([place] [address] ) 05/27/2022   • Colon cancer metastasized to liver ([place] [address] ) 03/12/2022   • Iron deficiency anemia, unspecified 03/01/2022   • Thrombocytosis 02/21/2022   • Hypokalemia 02/19/2022   • Transaminitis 02/19/2022   • Type 2 diabetes mellitus with hyperlipidemia ([place] [address] ) 02/18/2022   • Colonic mass 02/18/2022   • Microcytic anemia 02/18/2022   • Left ureteral calculus 01/30/2020   • Incarcerated umbilical hernia 62/68/5179   • Testicular hypogonadism 06/19/2017   • Low testosterone 05/30/2017   • Type 2 diabetes mellitus without complication, with long-term current use of insulin ([place] [address] ) 08/23/2016   • Benign essential hypertension 08/23/2016   • Mixed hyperlipidemia 08/23/2016   • Erectile dysfunction 07/11/2016   • Obesity (BMI 30-39 9) 07/11/2016      GAURANG Alford  Physical Medicine and Chris Luu    Total visit time: 25 minutes, with more than 50% spent counseling/coordinating care  Counseling includes discussion with patient re: progress in therapies, functional issues observed by therapy staff, and discussion with patient regarding their current medical state and wellbeing  Coordination of patient's care was performed in conjunction with Internal Medicine service to monitor patient's labs, vitals, and management of their comorbidities

## 2023-01-09 NOTE — TELEPHONE ENCOUNTER
Scheduling Appointment SEND TO [facility]    Who Is Calling to Schedule Patient    Doctor Dr John Romero   Location [facility]   Date and Time 02/16 at 10:00am    Reason for scheduling appointment kristian consult      Patient verbalized understanding    yes

## 2023-01-09 NOTE — PROGRESS NOTES
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  • s/p hepatic resection and reversal of colostomy on 11/7  • s/p right hemicolectomy with partial descending colectomy colocolonic anastomosis with loop ileostomy and mid line abdominal VAC placement 11/16  • Continue local wound care; WC following  • CT  Abd/pelvis 12/17 = loculated collection along splenic recess   Has perisplenic hilum collection as well --> to IR 12/20 for drain placement in both perigastric and perisplenic area and cx sent from both areas = Ecoli  • Flush drains with 10ml qd; no drainage recorded on 1/8  • S-O following/Dr Izabel Cervantes was in to see pt 1/2/23  • Tube check (incl perc alvina tube) done 1/3 = unchanged with still some fluid around each drain so kept in   Next tube check 2 weeks @1/17  • Bloody drainage in VICTORINO #3 and IR in to see pt and felt 2/2 tube manipulation when he went for his tube check     Acute cholecystitis  • s/p percutaneous tube placement 12/8 with tube check on 12/12, 12/14  • GI saw due to alk phos elevation = felt 2/2 infiltrative disease rather than focal biliary obstruction   AMA was negative     • Alkaline phosphatase isoenzyme sent 12/17 (48% liver/52% bone) = GI  recommended continuing to monitor his LFTs and defer to surgical oncology for further w/u and plan   Last 3 APs were [phone]  Andrew Alvarez recheck 1/3    • TB improving = to consider MRCP if total bili trends back up but was normal 12/30  • Alvina tube = 10 ml 1/8/23     • Drainage still has dark blood in it but seems to slowly becoming less     • Per GI, do cholangiogram with next tube check --> wasn't done 1/3 and GI aware  • GI wrote note 1/4 = aware labs incl GGT of 1992 (previously 3182 on 12/17)     Bacteremia/abdominal abscess  • ID following   • s/p Ertapenem was switched to Doxycycline per ID as of 12/30 but had to be switched back 2/2 nausea  • Ertapenem completed       Hypertension  • On Norvasc 5mg BID  • stable     Diabetes mellitus  • Home:  Glargine 35U qam/Glyburide-Metformin 5-500 qam/Januvia 100mg qam  • Here:  Lantus 10U qhs/Lispro 6U TID  • Lispro to be held if pt does not eat  • Has a DEXCOM meter at home  • Continue DM diet and QID Accuchecks/SSI  • Eating/meal completion is erratic and therefore BSs difficult       Acute on chronic renal failure  • Stage III; baseline 1 2-1 4  • Lisinopril currently on hold  • s/p NSS IVF with improvement of creat =  back down to 1 2 12/30  • Stable at 1 32 today 1/9/23     Anemia  • Multifactorial due to recent infection, surgery, CKD stage III  • Transfused on 12/15, 12/16/22 and 12/30  • Hemoglobin stable at 8 0 on 1/9/23     Atypical chest pain  • With elevation in troponin/EKG changes on acute side  • Cardiology saw then and cleared pt for discharge  • Did not recommend any further work up  • No further chest pain     Situational depression  • Neuropsychology consulted  • Patient not interested in medications at this point in time     Malnutrition  • Glucerna makes him nauseated  • On 12/15, ordered double protein     Pleural effusion  • CXR on 12/14 showed small left pleural effusion and was suspect for CHF  • ECHO 11/9/22 = LVEF 55%, unable to assess diastolic function, mild TR     L sided pleuritic pain  • located near drain site and per patient only occurs at night  • No evidence of infection        Discharge date:  1/9/23          The above assessment and plan was reviewed and updated as determined by my evaluation of the patient on 1/9/2023      Labs:   Results from last 7 days   Lab Units 01/09/23  0533 01/06/23  0618   WBC Thousand/uL 13 34* 12 41*   HEMOGLOBIN g/dL 8 0* 8 2*   HEMATOCRIT % 25 9* 26 1*   PLATELETS Thousands/uL 430* 480*     Results from last 7 days Lab Units 01/09/23  0533 01/06/23  0618   SODIUM mmol/L 136 135   POTASSIUM mmol/L 4 0 3 7   CHLORIDE mmol/L 109* 107   CO2 mmol/L 20* 20*   BUN mg/dL 24 20   CREATININE mg/dL 1 32* 1 13   CALCIUM mg/dL 8 5 8 7             Results from last 7 days   Lab Units 01/09/23  1035 01/09/23  0625 01/08/23  2111   POC GLUCOSE mg/dl 216* 133 159*       Review of Scheduled Meds:  Current Facility-Administered Medications   Medication Dose Route Frequency Provider Last Rate   • acetaminophen  975 mg Oral Q8H [address] GAURANG Keith     • amLODIPine  5 mg Oral BID GAURANG Keith     • aspirin  81 mg Oral Daily GAURANG Keith     • atorvastatin  40 mg Oral Daily GAURANG Keith     • calcium carbonate  500 mg Oral BID PRN GAURANG Barrow     • cholecalciferol  400 Units Oral Daily GAURANG Keith     • collagenase   Topical Daily Isaias Zaragoza MD     • heparin (porcine)  5,000 Units Subcutaneous Q8H [address] GAURANG Keith     • insulin glargine  10 Units Subcutaneous HS GAURANG Foreman     • insulin lispro  1-5 Units Subcutaneous HS GAURANG Keith     • insulin lispro  1-6 Units Subcutaneous TID AC GAURANG Keith     • insulin lispro  6 Units Subcutaneous TID With Meals GAURANG Foreman     • iohexol  50 mL Intravenous Once in imaging Giorgi Dobbs MD     • melatonin  3 mg Oral HS GAURANG Keith     • methocarbamol  500 mg Oral BID GAURANG Barrow     • multivitamin-minerals  1 tablet Oral Daily GAURANG Keith     • ondansetron  4 mg Oral Q6H PRN GAURANG Keith     • ondansetron  4 mg Oral BID GAURANG Jo     • oxyCODONE  10 mg Oral Q6H PRN GAURANG Barrow     • oxyCODONE  2 5 mg Oral Q3H PRN GAURANG Barrow     • oxyCODONE  5 mg Oral Q6H PRN GAURANG Keith     • pantoprazole  40 mg Oral BID AC GAURANG Keith     • simethicone  80 mg Oral 4x Daily (with meals and at bedtime) GAURANG Barrow     • tamsulosin 0 4 mg Oral Daily With Dinner GAURANG Keith         Subjective/ HPI: Patient seen and examined  Patients overnight issues or events were reviewed with nursing or staff during rounds or morning huddle session  New or overnight issues include the following:     No new or overnight issues  Offers no complaints  Had a very good day today  Did well in therapy  Eating well    ROS:   A 10 point ROS was performed; negative except as noted above  *Labs /Radiology studies reviewed  *Medications reviewed and reconciled as needed  *Please refer to order section for additional ordered labs studies  *Case discussed with primary attending during morning huddle case rounds    Physical Examination:  Vitals:   Vitals:    01/08/23 1305 01/08/23 2100 01/09/23 0530 01/09/23 0759   BP: 126/76 141/81 143/82 112/76   BP Location: Left arm Right arm Left arm Right arm   Pulse: 83 101 70 80   Resp: 18 16 16    Temp: 98 1 °F (36 7 °C) 99 3 °F (37 4 °C) 97 5 °F (36 4 °C)    TempSrc: Oral Oral Oral    SpO2: 97% 98% 98%    Weight:       Height:           General Appearance: no distress, conversive  HEENT:  External ear normal   Nose normal w/o drainage  Mucous membranes are moist  Oropharynx is clear  Conjunctiva clear w/o icterus or redness  Neck:  Supple, normal ROM  Lungs: BBS without crackles/wheeze/rhonchi; respirations unlabored with normal inspiratory/expiratory effort  No retractions noted  On RA  CV: regular rate and rhythm; no rubs/murmurs/gallops, PMI normal   ABD: Abdomen is soft  Bowel sounds all quadrants  Nontender with no distention  EXT: no edema  Skin: normal turgor, normal texture, no rashes  Psych: affect normal, mood normal  Neuro: AAO      The above physical exam was reviewed and updated as determined by my evaluation of the patient on 1/9/2023      Invasive Devices     Central Venous Catheter Line  Duration           Port A Cath 03/10/22 Right Chest 304 days          Drain  Duration Ileostomy LUQ 52 days    Abscess Drain Abdomen 31 days    Cholecystostomy Tube 27 days    Abscess Drain Abdomen 19 days    Abscess Drain Back 19 days                   VTE Pharmacologic Prophylaxis: Heparin  Code Status: Level 1 - Full Code  Current Length of Stay: 26 day(s)      Total time spent:  30 minutes with more than 50% spent counseling/coordinating care  Counseling includes discussion with patient re: progress  and discussion with patient of his/her current medical state/information  Coordination of patient's care was performed in conjunction with primary service  Time invested included review of patient's labs, vitals, and management of their comorbidities with continued monitoring  In addition, this patient was discussed with medical team including physician and advanced extenders  The care of the patient was extensively discussed and appropriate treatment plan was formulated unique for this patient  Medical decision making for the day was made by supervising physician unless otherwise noted in their attestation statement  ** Please Note:  voice to text software may have been used in the creation of this document   Although proof errors in transcription or interpretation are a potential of such software**

## 2023-01-09 NOTE — QUICK NOTE
Evaluated midline incision at bedside  Wound base with wet, soft, liquid slough present  Mechanically debrided with 4x4 gauze wipe  Slough removed with healthy bleeding tissue below  Plan:  -Continue local wound care [place] [facility] [place]  -Dressings to be changed by nursing as scheduled    -Cleared for d/c once equipment delivered to home    Fayne Lower

## 2023-01-09 NOTE — PLAN OF CARE
Problem: Prexisting or High Potential for Compromised Skin Integrity  Goal: Skin integrity is maintained or improved  Description: INTERVENTIONS:  - Identify patients at risk for skin breakdown  - Assess and monitor skin integrity  - Assess and monitor nutrition and hydration status  - Monitor labs   - Assess for incontinence   - Turn and reposition patient  - Assist with mobility/ambulation  - Relieve pressure over bony prominences  - Avoid friction and shearing  - Provide appropriate hygiene as needed including keeping skin clean and dry  - Evaluate need for skin moisturizer/barrier cream  - Collaborate with interdisciplinary team   - Patient/family teaching  - Consider wound care consult   Outcome: Progressing     Problem: SAFETY ADULT  Goal: Patient will remain free of falls  Description: INTERVENTIONS:  - Educate patient/family on patient safety including physical limitations  - Instruct patient to call for assistance with activity   - Consult OT/PT to assist with strengthening/mobility   - Keep Call bell within reach  - Keep bed low and locked with side rails adjusted as appropriate  - Keep care items and personal belongings within reach  - Initiate and maintain comfort rounds  - Make Fall Risk Sign visible to staff  - Offer Toileting every 2-4 Hours, in advance of need  - Initiate/Maintain bed/chair alarm  - Obtain necessary fall risk management equipment: nonskid footwear  - Apply yellow socks and bracelet for high fall risk patients  - Consider moving patient to room near nurses station  Outcome: Progressing

## 2023-01-09 NOTE — DISCHARGE INSTR - AVS FIRST PAGE
DISCHARGE INSTRUCTIONS: Catie Tovar 64 22    Bring these instructions with you to your Outpatient Physician appointments so they can order and follow-up any additional lab work or imaging recommended at time of discharge  Resume follow-up with all your prior providers that you have established care prior to this hospitalization  Discuss with primary care physician (PCP) if you have additional questions  It  is you or your caregivers responsibility to obtain follow-up MEDICATION REFILLS  As indicated through your Primary Care Physician (PCP) and other outpatient specialty provider(s) after discharge  Please follow-up with your PCP as soon as possible after discharge to set-up follow-up management and when appropriate refills  You remain a fall and injury risk which could be severe  - Your risk of fall has decreased however since admission to acute rehab  Caregiver training has been completed with our staff  - Appropriate supervision +/- assistance as instructed during your rehab course is recommended to decrease risk of fall and injury  - Continue skilled therapy as discussed after discharge to further decrease this risk    If you (or your health care proxy) have any questions or concerns regarding your acute rehabilitation stay including issues with medications, rehabilitation, and follow-up plan, please call:   [facility] in [facility] at [phone] or [phone]  Should you develop fevers, chills, new weakness, changes in sensation, difficulty speaking, facial weakness, confusion, shortness of breath, chest pain, or other concerning symptoms please call 911 and/or obtain transportation to nearest ER immediately  Should you develop worsening pain, swelling, or drainage notify your surgeon right away or obtain transportation to nearest ER for evaluation      *  PHYSICIANS to see:  Please see your doctors listed in the follow up providers section of your discharge paperwork, and take the discharge paperwork with you to your appointments  Home health has been ordered for you: [facility] [phone]  Your home health agency should reach out to you or your family soon to arrange follow-up  If you are unable to get in touch with home health you may contact your  at [phone]  [place]        LAB WORK recommended after discharge: Follow-up lab work at discretion of your outpatient physicians to be determined at time of your future appointments  Recommend the following labs be drawn by home health nursing (orders has been placed prior to discharge):    CBC and BMP to monitor hemoglobin, white blood cell count, electrolytes and kidney function  on 01/19/2023 with results to Dr Angle Hamilton    These labs must be interpreted by your outpatient primary care physician (PCP) and/or other outpatient specialists as discussed  It is your (or your caregiver's) responsibility to ensure these labs are drawn and followed up by the appropriate outpatient providers  Contact these providers after discharge to review labs and adjust management as indicated and to order any additional labs  Your acute rehab physicians will not be able to follow-up labs once discharged from Acute Rehab setting      IMAGING to follow-up:  Follow-up imaging as discussed with your recent physicians or at discretion of your outpatient physicians to be determined at time of your appointments  Please obtain a Chest x-ray on 01/30/2023, results to Dr Jordan and Dr Hansel Owen and ISSUES to follow-up:    12/20- Interventional Radiology for additional drains placed for a perisplenic abscess as well as a splenic recess abscess  Total of 3 VICTORINO drains, a percutaneous cholecystectomy tube and an ileostomy are in place  Interventional radiology tube check follow-up on 01/24/2023 at 11:15 am    2  Elevated Alkaline Phosphatase level: Last lab value 1125, follow-up with Surgical Oncology    Check under the "DISCHARGE PROVIDER" section of these [address] for provider contact information and specific issues as well  SKIN CARE INSTRUCTIONS to follow:  Monitor incision(s) for increased redness, swelling, pain/tenderness, discharge/pus and promptly notify your surgeon should these develop  - Should you develop significant pain, swelling, or drainage obtain transportation to nearest emergency room for immediate evaluation if unable to reach your surgeon promptly     [address] [place] [address] to follow:  Home health nursing will assist you with wound management  You may contact them with issues as well once this service is set-up  If you are unable to get in touch with home health you may contact your  at [phone]  Wound care of midline abdominal incision at this time is :  Midline Incision and Right Abdomen Wounds: Cleanse wounds with NS and pat dry  Apply Santyl to slough tissue on wound bed nickel thick  Cover with ABD and secure with minimal tape  Change daily or PRN soilage or displacement  Primary management of your wound(s) is [address] [place] [address] [facility] [place] and Surgical Oncology  You have a follow-up appointment with Dr Thomas Donohue  (Surgical Oncology) on 02/16 at 10:00 am    Please contact [address] [place] [address] service with follow-up issues/concerns  Ensure appropriate wound care to optimize chances for healing and decrease risks of complications  Your wound(s) remains at risk for worsening and infection     - Should you develop significant pain, fever, chills, sweats, changes in strength, sensation, color, or swelling obtain transportation to nearest emergency room for immediate evaluation if unable to reach your surgeon promptly     Driving restrictions: You are recommended against driving until cleared by an outpatient physician  Alcohol restrictions:   You are recommended to not drink alcohol at this time unless cleared by an outpatient physician  Combining alcohol with your current medications can increase your risk of injury which could be severe  Smoking restrictions: You are recommended to not smoke nicotine  Smoking increases your risk of heart attack, stroke, emphysema/COPD, and lung cancer  MEDICATIONS:  Please see a full list of your medications outlined in the After Visit Summary that is attached to these Discharge Instructions  Please note changes may have been made to your medications please refer to your discharge paperwork for your current medications and take this list with you to all your doctors appointments for your doctors to review  Please do not resume a home medication unless the medication reconciliation sheet indicates to do so, please do not assume that a medication that you were given a prescription for is the same as a medication you have at home based on both medications having the same name as dosages and frequency may have changed  Unless specifically noted in your medication list provided to you in your discharge paper work do not resume prior vitamins, minerals, or supplements you may have been taking prior to your hospitalization unless instructed by an outpatient physician in the future  Discuss with your primary care at next visit if applicable  This medication will need to be managed by your outpatient physician(s) after discharge  Follow-up with the appropriate provider as soon as possible to ensure appropriate use  Aspirin instructions  TAKE aspirin daily unless instructed otherwise by a doctor  This medication will need to be managed by your outpatient physician(s) after discharge  Follow-up with the appropriate provider as soon as possible to ensure appropriate use  This is a blood thinner    It is being recommended for use by you (or your family) to decrease the risk of clotting which can be severely disabling and even life-threatening  Even when provided as recommended it can cause severe disabling and even life-threatening bleeding  Too much or too little of this blood thinner further increases your risk of this medication causing serious and even life-threatening complications such as severe bleeding, clots, strokes, and death  With that said, at this time based on best available evidence and consensus agreement, your physicians recommend you take aspirin based on your overall risks and benefits in your specific medical situation  If you (or your family/caregiver) notice black stools, bloody stools, vomit blood, develop new weakness, slurred speech, confusion or have any other concerning symptoms call 911 or obtain transportation to nearest emergency room immediately  Sedating Medications with increased risk of complications: These medication(s) were started prior to your acute rehabilitation course as recommended by your prior physicians  It was continued during your acute rehabilitation course  This(these) medication(s) will need to be managed by your outpatient physician(s) after discharge  Follow-up with the appropriate provider as soon as possible to ensure appropriate use  Your goal will be to wean off of opiate medications and then onto acetaminophen only and then off of acetaminophen as well if possible  There are risks associated with opioid medications, including dependence, addiction and tolerance  The patient understands and agrees to use these medications only as prescribed  Potential side effects of the medications include, but are not limited to, constipation, drowsiness, addiction, impaired judgment and risk of fatal overdose if not taken as prescribed  You should not drive after taking this medication  The patient was warned against driving while taking sedation medications  Sharing medications is a felony   At this point in time, the patient is showing no signs of addiction, abuse, diversion or suicidal ideation  Oxycodone (opiate) pain medication has been used to help your acute pain  You tolerated this medication adequately during your recent hospital stay  There are risks associated with opioid medications  They can increase your risk of falling, injury, and breathing difficulties which can be severe and even life-threatening  Note it is advisable to limit use of opiate pain medications as they can become habit forming and lead to addiction  Other potential side effects of the medication include, but are not limited to, constipation, drowsiness, impaired judgment and risk of fatal overdose if not taken as prescribed  You should not drive while taking this medication  The patient was warned against driving while taking sedation medications  Sharing medications is a felony  At this point in time, the patient is showing no signs of addiction, abuse, diversion or suicidal ideation  The patient (you/or relevant caregiver) understands and agrees to use this medication only as prescribed  Methocarbamol (Robaxin) pain/muscle spasm medication has been used to help your acute pain and spasms  You tolerated this medication adequately during your recent hospital stay  - Do not take with alcohol (or marijuana/cannabis) while on this medication as this can cause increased confusion, breathing problems, falls, and severe injury  You will be given a very limited supply of both opiate pain and muscle relaxant medications both of which can increase your risk of fall and severe and even life-threatening injury  Taking both medications together further increase your risk of fall and even life-threatening injury as well as respiratory depression (slowing and stopping of breathing)  Physicians at times will carefully use these medications in combination but this must be done with close oversight    You have been carefully monitored during your rehab course on these medications without signs of increased confusion, respiratory depression, or worsening balance  Nevertheless, this risk remains  Should you develop confusion, difficulty staying awake, imbalance or other concerning symptoms do NOT take these medications and notify your physician right away or obtain transportation to nearest emergency room  You have been discussed risks and benefits of these medications and at this time have agreed to risks associated with these medications  MEDICAL MANAGEMENT AT HOME specific to you:    Diabetes Management:  Apply the [facility] monitor and check your blood sugars 4 times daily before meals and at bedtime and record them to provide to your doctors, contact your family doctor as soon as possible for blood sugars higher than 220 or lower than 100  Follow-up with PCP/family doctor regularly to ensure blood sugars remain adequately controlled  Monitor for signs or symptoms of low blood sugar (hypoglycemia)- such as sweating, trembling, feeling hungry, worried, more tired (More severe signs of hypoglycemia could be trouble walking, confusion, passing out)  - If present obtain blood sugar    If blood sugar less than 70 take quick source of sugar such as:  - at least half cup of juice   - 4-5 saltine crackers  - 1 tablespoon of sugar or honey  - 3-4 glucose tablets  (Avoid foods that you have history of allergy)    Recheck blood sugar within 30 minutes  If still low repeat providing additional quick sources of sugar  If still not improving or symptoms worsening/not improving seek medical attention right away or obtain medical transport/call 911  *Humalog a fast acting insulin is prescribed for meals, please do NOT administer if patient does not eat      Hypertension Management:  Only take the medications prescribed for you at time of discharge - overly high or low blood pressure increases your risk for health complications    Follow-up with PCP/family doctor regularly to ensure blood pressure remains adequately controlled  Please check your blood pressure prior to taking your blood pressure medications and keep a log that you will bring with you to your follow-up doctors' appointments  >Please contact your family doctor or cardiologist immediately for a blood pressure below 100/50 and do not take your blood pressure medications until speaking with them  >Please contact your family doctor or cardiologist as soon as possible for blood pressure greater than 160/100  Hypotension (Low blood pressure) Management:  Only take the medications prescribed for you at time of discharge - overly high or low blood pressure increases your risk for health complications  Please check your blood pressure prior to taking your blood pressure medications and keep a log that you will bring with you to your follow-up doctors' appointments  >Please contact your family doctor or cardiologist immediately for a blood pressure below 95/45 and do not take your blood pressure medications until speaking with them  >Please contact your family doctor or cardiologist as soon as possible for blood pressure greater than 160/100  >If your lightheadedness is more than usual or you have different symptoms such as increased shortness of breath, chest pain, confusion, difficulty staying awake, or other concerning signs or symptoms obtain transport to nearest emergency room as soon as possible  Anemia management:  Follow-up with primary care physician at next visit  Continue to monitor blood counts intermittently  Follow-up management at discretion of PCP   >If you develop increased lightheadedness, shortness of breath, chest pain, confusion, difficulty staying awake, or other concerning signs or symptoms obtain transport to nearest emergency room as soon as possible  Acetaminophen (Tylenol) Dosing Warning:     You may have up to 3000 mg of acetaminophen (Tylenol) from all sources spread out over a 24 hours period  Do not have more than that, as this can increase your risk of liver injury which can be serious  NSAID Warning:  Please avoid NSAID (including but not limited to advil, aleve, motrin, naproxen, ibuprofen, mobic, meloxicam, diclofenac etc) medications as NSAID medications may increase your risk of bleeding (which can be life-threatening), stroke, worsening kidney disease, heart attack, developing/worsening GI ulcers, worsening heart failure, worsening liver (cirrhosis) disease, potentially delay bone healing  Please note a summary of your hospital stay with relevant information for your doctors will try to be sent to them  Please confirm with your doctors at your follow up visits that they have received this summary and have them contact [address] [place] [address] if they have not received them along with any other medical records they may require       Cristel Hernandez Phone Number:  [phone]

## 2023-01-09 NOTE — PROGRESS NOTES
Progress Note - Infectious Disease   Dahiana Ast 61 y o  male MRN: [id number]  Unit/Bed#: -01 Encounter: [id number]      Impression/Recommendations:  Sepsis     Evolving 12/17:  WBC, HR   Suspect due to persistent left intra-abdominal infection in setting of complex history below, recently completed antibiotics   ROS and exam otherwise negative   Recent Flu/RSV/COVID PCR, CXR negative   Blood cultures negative   Improved with reinitiation of antibiotics, additional drain placement  Rec:  • Continue to follow closely off antibiotics as below  • Drains as below  • Follow temperatures and WBC closely  • Supportive care as per the primary service     Intra-abdominal abscess     In the setting complex surgical history as below   Initially developed 11/2022 accompanied by ESBL E  coli bacteremia   Status post exploratory laparotomy, right hemicolectomy, temporary abdominal closure 11/16; re-exploration, partial left colectomy, primary ileocolonic anastomosis with proximal diverting loop ileostomy, midline fascial closure on 11/17   More recently developed abscess in hepatectomy bed, also collection +/- leak at area of prior colonic anastamosis, 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   Status post IR drain into perihepatic collection 12/8   Cultures with ESBL E  Coli   Status post 7 days ertapenem after drain placement through 12/15   Developed recurrent sepsis and repeat CT 12/17 shows hepatic bed collection improved, persistent left intra-abdominal collection   Status post perigastic, perisplenic drains 12/20   Cultures again with ESBL E  Coli   Status post 21 days total antibiotics  Rec:  • Continue to follow closely off antibiotics  • Continue drains per IR     Possible acute cholecystitis     Status post percutaneous cholecystostomy tube   Alk phos remains markedly elevated, possibly due to drain itself versus known intrahepatic metastases      Metastatic colon cancer   To liver, possible lung   Status post resection, chemotherapy, more recent hepatic resection and ablation, transverse colon resection      CKD   Baseline Cr 1 4       DM      Anemia   Status post multiple transfusions      The patient is stable from an ID standpoint      Antibiotics:  Off antibiotics #2    Subjective:  Patient seen on AM rounds  Denies fevers, chills, sweats, nausea, vomiting, or diarrhea  Feels good  24 Hour Events:  No documented fevers, chills, sweats, nausea, vomiting, or diarrhea  Drain output minimal     Objective:  Vitals:  Temp:  [97 5 °F (36 4 °C)-99 3 °F (37 4 °C)] 97 5 °F (36 4 °C)  HR:  [] 80  Resp:  [16-18] 16  BP: (112-143)/(76-82) 112/76  SpO2:  [97 %-98 %] 98 %  Temp (24hrs), Av 3 °F (36 8 °C), Min:97 5 °F (36 4 °C), Max:99 3 °F (37 4 °C)  Current: Temperature: 97 5 °F (36 4 °C)    Physical Exam:   General:  No acute distress  Psychiatric:  Awake and alert  Pulmonary:  Normal respiratory excursion without accessory muscle use  Abdomen:  Soft, nontender  Extremities:  No edema  Skin:  No rashes    Lab Results:  I have personally reviewed pertinent labs  Results from last 7 days   Lab Units 23  0523  0618 23  0635   POTASSIUM mmol/L 4 0 3 7 4 1   CHLORIDE mmol/L 109* 107 108   CO2 mmol/L 20* 20* 19*   BUN mg/dL 24 20 20   CREATININE mg/dL 1 32* 1 13 1 30   EGFR ml/min/1 73sq m 59 71 60   CALCIUM mg/dL 8 5 8 7 8 5   AST U/L 45 43 54*   ALT U/L 13 12 14   ALK PHOS U/L 1,120* 1,025* 1,315*     Results from last 7 days   Lab Units 23  0533 23  0618   WBC Thousand/uL 13 34* 12 41*   HEMOGLOBIN g/dL 8 0* 8 2*   PLATELETS Thousands/uL 430* 480*           Imaging Studies:   I have personally reviewed pertinent imaging study reports and images in PACS  EKG, Pathology, and Other Studies:   I have personally reviewed pertinent reports

## 2023-01-09 NOTE — PROGRESS NOTES
01/09/23 1300   Pain Assessment   Pain Assessment Tool 0-10   Pain Score No Pain   Restrictions/Precautions   Precautions Fall Risk;Contact/isolation;Multiple lines  (VICTORINO drain, ileostomy, IR drain)   Weight Bearing Restrictions No   ROM Restrictions No   Lifestyle   Autonomy "I'm exhausted from being emotional"   Eating   Type of Assistance Needed Independent   Physical Assistance Level No physical assistance   Comment seated upright in bed   Eating CARE Score 6   Sit to Stand   Type of Assistance Needed Supervision   Physical Assistance Level No physical assistance   Comment CS with RW   Sit to Stand CARE Score 4   Bed-Chair Transfer   Type of Assistance Needed Supervision   Physical Assistance Level No physical assistance   Comment CS with RW   Chair/Bed-to-Chair Transfer CARE Score 4   Toileting Hygiene   Comment pt uses urinal IND seated upright in bed; assist to empty urinal   Functional Standing Tolerance   Time 1-2 minutes with support from RW   Activity pt engages in standing tolerance activities focusing on inc stand julia/bal with support from RW using resistive clothes pins at raised window shade  pt was instructed to place clothes pins on window shade using either R or L UE to inc UE strength, ROM and overall endurance/activity tolerance  pt noted to fatigue rather quickly during task with pt stating, "I don't usually lift my arms over head, that made me tired"  pt completed x2 trials at overall CS level with prolonged rest break provided to manage fatigue  of note, pt reported feeling "chilled" from standing at window sill, and so returned to bed at end of session with blankets donned  Exercise Tools   Theraband reviewed previously provided theraband UE HEP with pt demo'ing great recall of exercises, form and sets/reps  inc time to complete, rest breaks as needed to manage fatigue  HEP completed in order to inc/maintain strength and endurance for ADLs/transfers     Cognition   Overall Cognitive Status [facility]   Arousal/Participation Cooperative   Attention Attends with cues to redirect   Orientation Level Oriented X4   Memory Within functional limits   Following Commands Follows one step commands with increased time or repetition   Additional Activities   Additional Activities Comments pt reports receiving a phone call regarding his hospital bed in which it will be delivered to his home tomorrow--informed JIMENA Coto who reports at this time is unable to secure a [facility] agency  Activity Tolerance   Activity Tolerance Patient tolerated treatment well   Assessment   Treatment Assessment pt engages in 90 minute skilled OT session focusing on func transfers, standing julia/bal, review of UE HEP and overall activity tolerance  see above for full func details  at start of session pt reports receiving phone call from [facility] [employer] regarding hospital bed which is now scheduled for delivery to his house tomorrow--informed Cristel Nicolas [phone]  pt reports having a good but tiring day as he reports walking "250 steps" in PT this AM; continues to demo slow and steady progress toward OT goals including overall activity tolerance, improving strength and endurance for ADLs/transfers  pt remains emotional but only at end of session, otherwise appeared to be in good spirits with a light hearted/sarcastic demeanor this session  pt remains motivated however also remains significantly limited by impaired strength, endurance, activity tolerance, pain, warranting continued skilled care to focus on ADL retraining, func transfers, standing julia/bal, UE strengthening/endurance, in order to decrease burden of care at d/c  Prognosis Fair   Problem List Decreased strength;Decreased endurance;Decreased skin integrity;Orthopedic restrictions;Pain;Decreased mobility   Barriers to Discharge Inaccessible home environment;Decreased caregiver support   Plan   Treatment/Interventions ADL retraining;Functional transfer training; Therapeutic exercise; Endurance training;Patient/family training;Equipment eval/education; Compensatory technique education   OT Therapy Minutes   OT Time In 1300   OT Time Out 1430   OT Total Time (minutes) 90   OT Mode of treatment - Individual (minutes) 90   OT Mode of treatment - Concurrent (minutes) 0   OT Mode of treatment - Group (minutes) 0   OT Mode of treatment - Co-treat (minutes) 0   OT Mode of Treatment - Total time(minutes) 90 minutes   OT Cumulative Minutes 5909   Therapy Time missed   Time missed?  No

## 2023-01-09 NOTE — PROGRESS NOTES
01/09/23 1000   Pain Assessment   Pain Assessment Tool 0-10   Pain Score No Pain   Restrictions/Precautions   Precautions Fall Risk;Contact/isolation;Multiple lines  (VICTORINO drain, ileostomy, IR drain)   Subjective   Subjective pt agreeable to perform skilled PT   Roll Left and Right   Type of Assistance Needed Supervision   Roll Left and Right CARE Score 4   Sit to Lying   Type of Assistance Needed Supervision   Sit to Lying CARE Score 4   Lying to Sitting on Side of Bed   Type of Assistance Needed Supervision   Lying to Sitting on Side of Bed CARE Score 4   Sit to Stand   Type of Assistance Needed Supervision   Sit to Stand CARE Score 4   Bed-Chair Transfer   Type of Assistance Needed Supervision   Chair/Bed-to-Chair Transfer CARE Score 4   Transfer Bed/Chair/Wheelchair   Adaptive Equipment Roller Walker   Car Transfer   Type of Assistance Needed Supervision   Car Transfer CARE Score 4   Walk 10 Feet   Type of Assistance Needed Supervision; Adaptive equipment   Walk 10 Feet CARE Score 4   Walk 50 Feet with Two Turns   Type of Assistance Needed Supervision; Adaptive equipment   Walk 50 Feet with Two Turns CARE Score 4   Walk 150 Feet   Type of Assistance Needed Supervision; Adaptive equipment   Walk 150 Feet CARE Score 4   Walking 10 Feet on Uneven Surfaces   Type of Assistance Needed Incidental touching   Comment RW floor mat   Walking 10 Feet on Uneven Surfaces CARE Score 4   Ambulation   Primary Mode of Locomotion Prior to Admission Walk   Distance Walked (feet) 250 ft   Assist Device Roller Walker   Does the patient walk? 2  Yes   Wheel 50 Feet with Two Turns   Type of Assistance Needed Independent   Wheel 50 Feet with Two Turns CARE Score 6   Wheel 150 Feet   Type of Assistance Needed Independent   Wheel 150 Feet CARE Score 6   Wheelchair mobility   Does the patient use a wheelchair? 1  Yes   Type of Wheelchair Used 1  Manual   Method Right upper extremity; Left upper extremity   Picking Up Object   Type of Assistance Needed Supervision; Adaptive equipment   Comment with reacher   Picking Up Object CARE Score 4   Therapeutic Interventions   Strengthening LAQ AP marching 10-15 reps STS x5   Flexibility BLE heel stretch manual   Assessment   Treatment Assessment pt focus  on carescore today with in gait distance with RW up to 250 feet at S lvl , pt more indep lvl with WC propl and management , Pt cont to need more strengthening and condtiioning to meet DC goals  Pt BP WFL see vitals and pt body weight 188 lb and needs to be weight daily   Pt return to his room with all needs in reach in bed   Cont POC   Barriers to Discharge Inaccessible home environment;Decreased caregiver support   Plan   Progress Progressing toward goals   Recommendation   PT Discharge Recommendation Home with home health rehabilitation   PT Therapy Minutes   PT Time In 1000   PT Time Out 1130   PT Total Time (minutes) 90   PT Mode of treatment - Individual (minutes) 90   PT Mode of treatment - Concurrent (minutes) 0   PT Mode of treatment - Group (minutes) 0   PT Mode of treatment - Co-treat (minutes) 0   PT Mode of Treatment - Total time(minutes) 90 minutes   PT Cumulative Minutes 1725   Therapy Time missed   Time missed?  No

## 2023-01-09 NOTE — CASE MANAGEMENT
Search continues for a [facility] company to provide rn pt and ot services  Referrals made to [facility], [facility], and [facility] stated they are not par with pts insurance  Awaiting determination from the other two [facility] agenices  [facility] and [facility] are unable to accept the referral  Cm phoned [facility] as no determination had yet been received at 1500  I was told contact would be made with Jose at their [email or web address] who would reach out to CM with a determination  CM stressed the need for a call back as pt is trying to get back home tomorrow  dme order placed with edo via [employer] , items are scheduled to be delivered tomorrow

## 2023-01-10 LAB
ANION GAP SERPL CALCULATED.3IONS-SCNC: 6 MMOL/L (ref 4–13)
BASOPHILS # BLD AUTO: 0.07 THOUSANDS/ÂΜL (ref 0–0.1)
BASOPHILS NFR BLD AUTO: 0 % (ref 0–1)
BUN SERPL-MCNC: 25 MG/DL (ref 5–25)
CALCIUM SERPL-MCNC: 8.5 MG/DL (ref 8.3–10.1)
CHLORIDE SERPL-SCNC: 108 MMOL/L (ref 96–108)
CO2 SERPL-SCNC: 20 MMOL/L (ref 21–32)
CREAT SERPL-MCNC: 1.44 MG/DL (ref 0.6–1.3)
DME PARACHUTE DELIVERY DATE ACTUAL: NORMAL
DME PARACHUTE DELIVERY DATE EXPECTED: NORMAL
DME PARACHUTE DELIVERY DATE REQUESTED: NORMAL
DME PARACHUTE DELIVERY NOTE: NORMAL
DME PARACHUTE ITEM DESCRIPTION: NORMAL
DME PARACHUTE ORDER STATUS: NORMAL
DME PARACHUTE SUPPLIER NAME: NORMAL
DME PARACHUTE SUPPLIER PHONE: NORMAL
EOSINOPHIL # BLD AUTO: 0 THOUSAND/ÂΜL (ref 0–0.61)
EOSINOPHIL NFR BLD AUTO: 0 % (ref 0–6)
ERYTHROCYTE [DISTWIDTH] IN BLOOD BY AUTOMATED COUNT: 17.7 % (ref 11.6–15.1)
GFR SERPL CREATININE-BSD FRML MDRD: 53 ML/MIN/1.73SQ M
GLUCOSE SERPL-MCNC: 101 MG/DL (ref 65–140)
GLUCOSE SERPL-MCNC: 128 MG/DL (ref 65–140)
GLUCOSE SERPL-MCNC: 164 MG/DL (ref 65–140)
GLUCOSE SERPL-MCNC: 335 MG/DL (ref 65–140)
GLUCOSE SERPL-MCNC: 345 MG/DL (ref 65–140)
HCT VFR BLD AUTO: 25.2 % (ref 36.5–49.3)
HGB BLD-MCNC: 7.9 G/DL (ref 12–17)
IMM GRANULOCYTES # BLD AUTO: 0.27 THOUSAND/UL (ref 0–0.2)
IMM GRANULOCYTES NFR BLD AUTO: 2 % (ref 0–2)
LYMPHOCYTES # BLD AUTO: 1.44 THOUSANDS/ÂΜL (ref 0.6–4.47)
LYMPHOCYTES NFR BLD AUTO: 9 % (ref 14–44)
MCH RBC QN AUTO: 26.2 PG (ref 26.8–34.3)
MCHC RBC AUTO-ENTMCNC: 31.3 G/DL (ref 31.4–37.4)
MCV RBC AUTO: 84 FL (ref 82–98)
MONOCYTES # BLD AUTO: 1.77 THOUSAND/ÂΜL (ref 0.17–1.22)
MONOCYTES NFR BLD AUTO: 11 % (ref 4–12)
NEUTROPHILS # BLD AUTO: 13.11 THOUSANDS/ÂΜL (ref 1.85–7.62)
NEUTS SEG NFR BLD AUTO: 78 % (ref 43–75)
NRBC BLD AUTO-RTO: 0 /100 WBCS
PLATELET # BLD AUTO: 383 THOUSANDS/UL (ref 149–390)
PMV BLD AUTO: 9.6 FL (ref 8.9–12.7)
POTASSIUM SERPL-SCNC: 4.5 MMOL/L (ref 3.5–5.3)
RBC # BLD AUTO: 3.01 MILLION/UL (ref 3.88–5.62)
SODIUM SERPL-SCNC: 134 MMOL/L (ref 135–147)
WBC # BLD AUTO: 16.66 THOUSAND/UL (ref 4.31–10.16)

## 2023-01-10 RX ORDER — ACETAMINOPHEN 325 MG/1
325 TABLET ORAL EVERY 4 HOURS PRN
Status: DISCONTINUED | OUTPATIENT
Start: 2023-01-10 | End: 2023-01-17 | Stop reason: HOSPADM

## 2023-01-10 RX ORDER — SODIUM CHLORIDE 9 MG/ML
75 INJECTION, SOLUTION INTRAVENOUS CONTINUOUS
Status: DISCONTINUED | OUTPATIENT
Start: 2023-01-10 | End: 2023-01-13

## 2023-01-10 RX ORDER — INSULIN LISPRO 100 [IU]/ML
4 INJECTION, SOLUTION INTRAVENOUS; SUBCUTANEOUS
Status: DISCONTINUED | OUTPATIENT
Start: 2023-01-10 | End: 2023-01-17 | Stop reason: HOSPADM

## 2023-01-10 RX ADMIN — ATORVASTATIN CALCIUM 40 MG: 40 TABLET, FILM COATED ORAL at 08:09

## 2023-01-10 RX ADMIN — ACETAMINOPHEN 975 MG: 325 TABLET, FILM COATED ORAL at 20:27

## 2023-01-10 RX ADMIN — INSULIN GLARGINE 10 UNITS: 100 INJECTION, SOLUTION SUBCUTANEOUS at 21:41

## 2023-01-10 RX ADMIN — CHOLECALCIFEROL TAB 10 MCG (400 UNIT) 400 UNITS: 10 TAB at 08:10

## 2023-01-10 RX ADMIN — INSULIN LISPRO 1 UNITS: 100 INJECTION, SOLUTION INTRAVENOUS; SUBCUTANEOUS at 21:55

## 2023-01-10 RX ADMIN — INSULIN LISPRO 4 UNITS: 100 INJECTION, SOLUTION INTRAVENOUS; SUBCUTANEOUS at 12:28

## 2023-01-10 RX ADMIN — MELATONIN TAB 3 MG 3 MG: 3 TAB at 21:34

## 2023-01-10 RX ADMIN — ACETAMINOPHEN 975 MG: 325 TABLET, FILM COATED ORAL at 06:35

## 2023-01-10 RX ADMIN — ONDANSETRON 4 MG: 4 TABLET, ORALLY DISINTEGRATING ORAL at 20:26

## 2023-01-10 RX ADMIN — METHOCARBAMOL 500 MG: 500 TABLET ORAL at 08:09

## 2023-01-10 RX ADMIN — AMLODIPINE BESYLATE 5 MG: 5 TABLET ORAL at 08:09

## 2023-01-10 RX ADMIN — METHOCARBAMOL 500 MG: 500 TABLET ORAL at 17:43

## 2023-01-10 RX ADMIN — SIMETHICONE 80 MG: 80 TABLET, CHEWABLE ORAL at 21:34

## 2023-01-10 RX ADMIN — PANTOPRAZOLE SODIUM 40 MG: 40 TABLET, DELAYED RELEASE ORAL at 06:35

## 2023-01-10 RX ADMIN — SIMETHICONE 80 MG: 80 TABLET, CHEWABLE ORAL at 17:43

## 2023-01-10 RX ADMIN — SODIUM CHLORIDE 75 ML/HR: 0.9 INJECTION, SOLUTION INTRAVENOUS at 13:00

## 2023-01-10 RX ADMIN — HEPARIN SODIUM 5000 UNITS: 5000 INJECTION INTRAVENOUS; SUBCUTANEOUS at 21:34

## 2023-01-10 RX ADMIN — INSULIN LISPRO 5 UNITS: 100 INJECTION, SOLUTION INTRAVENOUS; SUBCUTANEOUS at 11:37

## 2023-01-10 RX ADMIN — ERTAPENEM SODIUM 1000 MG: 1 INJECTION, POWDER, LYOPHILIZED, FOR SOLUTION INTRAMUSCULAR; INTRAVENOUS at 22:48

## 2023-01-10 RX ADMIN — HEPARIN SODIUM 5000 UNITS: 5000 INJECTION INTRAVENOUS; SUBCUTANEOUS at 06:36

## 2023-01-10 RX ADMIN — Medication 1 TABLET: at 08:08

## 2023-01-10 RX ADMIN — TAMSULOSIN HYDROCHLORIDE 0.4 MG: 0.4 CAPSULE ORAL at 17:43

## 2023-01-10 RX ADMIN — ACETAMINOPHEN 975 MG: 325 TABLET, FILM COATED ORAL at 13:24

## 2023-01-10 RX ADMIN — COLLAGENASE SANTYL: 250 OINTMENT TOPICAL at 08:11

## 2023-01-10 RX ADMIN — PANTOPRAZOLE SODIUM 40 MG: 40 TABLET, DELAYED RELEASE ORAL at 17:43

## 2023-01-10 RX ADMIN — SIMETHICONE 80 MG: 80 TABLET, CHEWABLE ORAL at 12:27

## 2023-01-10 RX ADMIN — AMLODIPINE BESYLATE 5 MG: 5 TABLET ORAL at 17:43

## 2023-01-10 RX ADMIN — ASPIRIN 81 MG CHEWABLE TABLET 81 MG: 81 TABLET CHEWABLE at 08:09

## 2023-01-10 RX ADMIN — HEPARIN SODIUM 5000 UNITS: 5000 INJECTION INTRAVENOUS; SUBCUTANEOUS at 13:24

## 2023-01-10 RX ADMIN — OXYCODONE HYDROCHLORIDE 10 MG: 10 TABLET ORAL at 21:34

## 2023-01-10 NOTE — PROGRESS NOTES
01/10/23 1300   Pain Assessment   Pain Assessment Tool 0-10   Pain Score No Pain   Restrictions/Precautions   Precautions Fall Risk;Contact/isolation;Multiple lines  (VICTORINO drain, ileostomy, IR drain)   Weight Bearing Restrictions No   ROM Restrictions No   Lifestyle   Autonomy "I am just so tired of all of this"   Exercise Tools   Exercise Tools Yes   Hand Gripper pt engages in B/L hand strengthening using 3# digiflex focusing on isolated finger strengthening for self care, leisure tasks  pt tolerates well with rest breaks as needed to managa fatigue  20 reps on each digit bilaterally, completed  Other Exercise Tool 1 UE Strengthening completed using 3# dowel bar, 3x10 for chest press, shoulder flexion to 90 degrees and elbow flexion/ext  strengthening completed in order to inc strength and endurance for ADLs/transfers  rest breaks as needed to manage fatigue  Other Exercise Tool 2 pt further engages in UE strengthening using red flexbar, 3x10 for forearm pronation/supination and wrist flexion/ext  pt tolerates well, reporting much interest in this exercise tool stating, "I really like this, I can actually feel this working my arms"  rest breaks as needed to manage fatigue  Cognition   Overall Cognitive Status WFL   Arousal/Participation Cooperative   Attention Attends with cues to redirect   Orientation Level Oriented X4   Memory Within functional limits   Following Commands Follows one step commands with increased time or repetition   Activity Tolerance   Activity Tolerance Treatment limited secondary to medical complications (Comment); Patient limited by fatigue   Assessment   Treatment Assessment pt engages in 90 minute skilled OT Session focusing on UE strengthening and providing emotional support  see above for full func details  pt with anticipated d/c today, however due to inc in WBC will not be d/c'ing today  pt in bed hooked up to IV, agreeable to bedside treat but no OOB treats at this time   pt continues to be emotional at times, however reports frustrated by this as he is "exhausted from being emotional"  recommend continued skilled care to focus on ADL retraining, func transfers, standing julia/bal, UE Strengthening, activity tolerance, in order to decrease burden of care at d/c  Prognosis Fair   Problem List Decreased strength;Decreased endurance;Decreased skin integrity;Orthopedic restrictions;Pain;Decreased mobility   Barriers to Discharge Decreased caregiver support   Plan   Treatment/Interventions ADL retraining;Functional transfer training; Therapeutic exercise; Endurance training;Cognitive reorientation;Patient/family training;Equipment eval/education; Compensatory technique education   OT Therapy Minutes   OT Time In 1300   OT Time Out 1430   OT Total Time (minutes) 90   OT Mode of treatment - Individual (minutes) 90   OT Mode of treatment - Concurrent (minutes) 0   OT Mode of treatment - Group (minutes) 0   OT Mode of treatment - Co-treat (minutes) 0   OT Mode of Treatment - Total time(minutes) 90 minutes   OT Cumulative Minutes 4536   Therapy Time missed   Time missed?  No

## 2023-01-10 NOTE — PROGRESS NOTES
PM&R Progress Note:    Since yesterday evening patient with chills  No other new complaints  Very low grad temp 99 3 at 11PM, but NO FEVERS  If clinically remains stable, ok to discharge HOME today, pending [address] arrangements  Equipment to be delivered to home additionally today      Aida Luo MD  PM&R

## 2023-01-10 NOTE — PLAN OF CARE
Problem: Prexisting or High Potential for Compromised Skin Integrity  Goal: Skin integrity is maintained or improved  Description: INTERVENTIONS:  - Identify patients at risk for skin breakdown  - Assess and monitor skin integrity  - Assess and monitor nutrition and hydration status  - Monitor labs   - Assess for incontinence   - Turn and reposition patient  - Assist with mobility/ambulation  - Relieve pressure over bony prominences  - Avoid friction and shearing  - Provide appropriate hygiene as needed including keeping skin clean and dry  - Evaluate need for skin moisturizer/barrier cream  - Collaborate with interdisciplinary team   - Patient/family teaching  - Consider wound care consult   Outcome: Progressing     Problem: Potential for Falls  Goal: Patient will remain free of falls  Description: INTERVENTIONS:  - Educate patient/family on patient safety including physical limitations  - Instruct patient to call for assistance with activity   - Consult OT/PT to assist with strengthening/mobility   - Keep Call bell within reach  - Keep bed low and locked with side rails adjusted as appropriate  - Keep care items and personal belongings within reach  - Initiate and maintain comfort rounds  - Make Fall Risk Sign visible to staff  - Offer Toileting every 2-4 Hours, in advance of need  - Initiate/Maintain bed/chair alarm  - Obtain necessary fall risk management equipment: nonskid footwear   - Apply yellow socks and bracelet for high fall risk patients  - Consider moving patient to room near nurses station  Outcome: Progressing     Problem: PAIN - ADULT  Goal: Verbalizes/displays adequate comfort level or baseline comfort level  Description: Interventions:  - Encourage patient to monitor pain and request assistance  - Assess pain using appropriate pain scale  - Administer analgesics based on type and severity of pain and evaluate response  - Implement non-pharmacological measures as appropriate and evaluate response  - Consider cultural and social influences on pain and pain management  - Notify physician/advanced practitioner if interventions unsuccessful or patient reports new pain  Outcome: Progressing     Problem: INFECTION - ADULT  Goal: Absence or prevention of progression during hospitalization  Description: INTERVENTIONS:  - Assess and monitor for signs and symptoms of infection  - Monitor lab/diagnostic results  - Monitor all insertion sites, i e  indwelling lines, tubes, and drains  - Monitor endotracheal if appropriate and nasal secretions for changes in amount and color  - Winside appropriate cooling/warming therapies per order  - Administer medications as ordered  - Instruct and encourage patient and family to use good hand hygiene technique  - Identify and instruct in appropriate isolation precautions for identified infection/condition  Outcome: Progressing     Problem: SAFETY ADULT  Goal: Patient will remain free of falls  Description: INTERVENTIONS:  - Educate patient/family on patient safety including physical limitations  - Instruct patient to call for assistance with activity   - Consult OT/PT to assist with strengthening/mobility   - Keep Call bell within reach  - Keep bed low and locked with side rails adjusted as appropriate  - Keep care items and personal belongings within reach  - Initiate and maintain comfort rounds  - Make Fall Risk Sign visible to staff  - Offer Toileting every 2-4 Hours, in advance of need  - Initiate/Maintain bed/chair alarm  - Obtain necessary fall risk management equipment: nonskid footwear  - Apply yellow socks and bracelet for high fall risk patients  - Consider moving patient to room near nurses station  Outcome: Progressing  Goal: Maintain or return to baseline ADL function  Description: INTERVENTIONS:  -  Assess patient's ability to carry out ADLs; assess patient's baseline for ADL function and identify physical deficits which impact ability to perform ADLs (bathing, care of mouth/teeth, toileting, grooming, dressing, etc )  - Assess/evaluate cause of self-care deficits   - Assess range of motion  - Assess patient's mobility; develop plan if impaired  - Assess patient's need for assistive devices and provide as appropriate  - Encourage maximum independence but intervene and supervise when necessary  - Involve family in performance of ADLs  - Assess for home care needs following discharge   - Consider OT consult to assist with ADL evaluation and planning for discharge  - Provide patient education as appropriate  Outcome: Progressing     Problem: DISCHARGE PLANNING  Goal: Discharge to home or other facility with appropriate resources  Description: INTERVENTIONS:  - Identify barriers to discharge w/patient and caregiver  - Arrange for needed discharge resources and transportation as appropriate  - Identify discharge learning needs (meds, wound care, etc )  - Arrange for interpretive services to assist at discharge as needed  - Refer to Case Management Department for coordinating discharge planning if the patient needs post-hospital services based on physician/advanced practitioner order or complex needs related to functional status, cognitive ability, or social support system  Outcome: Progressing     Problem: Nutrition/Hydration-ADULT  Goal: Nutrient/Hydration intake appropriate for improving, restoring or maintaining nutritional needs  Description: Monitor and assess patient's nutrition/hydration status for malnutrition  Collaborate with interdisciplinary team and initiate plan and interventions as ordered  Monitor patient's weight and dietary intake as ordered or per policy  Utilize nutrition screening tool and intervene as necessary  Determine patient's food preferences and provide high-protein, high-caloric foods as appropriate       INTERVENTIONS:  - Monitor oral intake, urinary output, labs, and treatment plans  - Assess nutrition and hydration status and recommend course of action  - Evaluate amount of meals eaten  - Assist patient with eating if necessary   - Allow adequate time for meals  - Recommend/ encourage appropriate diets, oral nutritional supplements, and vitamin/mineral supplements  - Order, calculate, and assess calorie counts as needed  - Recommend, monitor, and adjust tube feedings and TPN/PPN based on assessed needs  - Assess need for intravenous fluids  - Provide specific nutrition/hydration education as appropriate  - Include patient/family/caregiver in decisions related to nutrition  Outcome: Progressing     Problem: MOBILITY - ADULT  Goal: Maintain or return to baseline ADL function  Description: INTERVENTIONS:  -  Assess patient's ability to carry out ADLs; assess patient's baseline for ADL function and identify physical deficits which impact ability to perform ADLs (bathing, care of mouth/teeth, toileting, grooming, dressing, etc )  - Assess/evaluate cause of self-care deficits   - Assess range of motion  - Assess patient's mobility; develop plan if impaired  - Assess patient's need for assistive devices and provide as appropriate  - Encourage maximum independence but intervene and supervise when necessary  - Involve family in performance of ADLs  - Assess for home care needs following discharge   - Consider OT consult to assist with ADL evaluation and planning for discharge  - Provide patient education as appropriate  Outcome: Progressing  Goal: Maintains/Returns to pre admission functional level  Description: INTERVENTIONS:  - Perform BMAT or MOVE assessment daily    - Set and communicate daily mobility goal to care team and patient/family/caregiver  - Collaborate with rehabilitation services on mobility goals if consulted  - Perform Range of Motion 3 times a day  - Reposition patient every 2 hours    - Dangle patient 3 times a day  - Stand patient 3 times a day  - Ambulate patient 3 times a day  - Out of bed to chair 3 times a day   - Out of bed for meals 3 times a day  - Out of bed for toileting  - Record patient progress and toleration of activity level   Outcome: Progressing

## 2023-01-10 NOTE — PROGRESS NOTES
01/10/23 1000   Pain Assessment   Pain Assessment Tool 0-10   Pain Score No Pain   Restrictions/Precautions   Precautions Fall Risk;Contact/isolation;Multiple lines  (VICTORINO drain, ileostomy, IR drain)   Weight Bearing Restrictions No   ROM Restrictions No   Cognition   Overall Cognitive Status WFL   Arousal/Participation Cooperative   Attention Attends with cues to redirect   Orientation Level Oriented X4   Memory Within functional limits   Following Commands Follows one step commands with increased time or repetition   Sit to Lying   Type of Assistance Needed Supervision; Adaptive equipment   Comment use of bed rails   Sit to Lying CARE Score 4   Lying to Sitting on Side of Bed   Type of Assistance Needed Supervision; Adaptive equipment   Comment use of bed rails   Lying to Sitting on Side of Bed CARE Score 4   Sit to Stand   Type of Assistance Needed Supervision; Adaptive equipment   Comment CS with RW   Sit to Stand CARE Score 4   Bed-Chair Transfer   Type of Assistance Needed Supervision; Adaptive equipment   Comment CS with RW   Chair/Bed-to-Chair Transfer CARE Score 4   Transfer Bed/Chair/Wheelchair   Adaptive Equipment Roller Walker   Car Transfer   Type of Assistance Needed Supervision; Adaptive equipment   Car Transfer CARE Score 4   Walk 10 Feet   Type of Assistance Needed Supervision; Adaptive equipment   Comment CS with RW   Walk 10 Feet CARE Score 4   Ambulation   Primary Mode of Locomotion Prior to Admission Walk   Distance Walked (feet) 150 ft  (for strengtheing and conditioning )   Assist Device Roller Walker   Gait Pattern Shuffle   Limitations Noted In Balance; Endurance; Safety;Speed   Provided Assistance with: Direction   Walk Assist Level Close Supervision   Does the patient walk? 2   Yes   Wheel 50 Feet with Two Turns   Type of Assistance Needed Independent   Wheel 50 Feet with Two Turns CARE Score 6   Wheel 150 Feet   Type of Assistance Needed Independent   Wheel 150 Feet CARE Score 6   Wheelchair mobility   Does the patient use a wheelchair? 1  Yes   Type of Wheelchair Used 1  Manual   Method Right upper extremity; Left upper extremity   Assistance Provided For Remove Leg Rest;Replace Leg Rest;Remove armrests;Replace armrests   Distance Level Surface (feet) 150 ft   Curb or Single Stair   Style negotiated Curb   Type of Assistance Needed Supervision; Incidental touching; Adaptive equipment   Comment CS/CGA with RW, VC's to get closer to step prior to lifting RW    1 Step (Curb) CARE Score 4   4 Steps   Type of Assistance Needed Incidental touching; Adaptive equipment   Physical Assistance Level No physical assistance   Comment CGA with BHR's for strengthening on 6"  steps   4 Steps CARE Score 4   12 Steps   Reason if not Attempted Safety concerns   12 Steps CARE Score 88   Stairs   Type Stairs   # of Steps 10   Weight Bearing Precautions Fall Risk   Assist Devices Bilateral Rail;Roller Walker   Picking Up Object   Type of Assistance Needed Supervision; Adaptive equipment   Comment reacher and RW   Picking Up Object CARE Score 4   Therapeutic Interventions   Neuromuscular Re-Education amb up to 150' with RW for strengthening and coordination  remains inconsistent and not recommended to [facility] more then [facility] dist in home with CS and RW  Other A with bathing upper body prior to leaving room  Assessment   Treatment Assessment Pt participated in skilled PT session with increased focus on HH dist amb, stair management, functional transfers and safety awareness  Pt remains limited by inconsistency of act tolerance so cont recommendation is for pt to maintain WC level I at home and HH dist amb with RW with wife providing CS  Pt anticipates possible discharge home today with hospital bed, WC and RW, HHPT and HEP     Problem List Decreased strength;Decreased endurance;Decreased skin integrity;Orthopedic restrictions;Pain;Decreased mobility   Barriers to Discharge Decreased caregiver support   PT Barriers   Functional Limitation Car transfers; Ramp negotiation;Stair negotiation;Standing;Transfers; Walking; Wheelchair management   Plan   Treatment/Interventions Functional transfer training;LE strengthening/ROM; Therapeutic exercise; Endurance training;Gait training   Progress Progressing toward goals   Recommendation   PT Discharge Recommendation Home with home health rehabilitation   Equipment Recommended Wheelchair;Walker  (hospital bed)   PT Therapy Minutes   PT Time In 1000   PT Time Out 1130   PT Total Time (minutes) 90   PT Mode of treatment - Individual (minutes) 90   PT Mode of treatment - Concurrent (minutes) 0   PT Mode of treatment - Group (minutes) 0   PT Mode of treatment - Co-treat (minutes) 0   PT Mode of Treatment - Total time(minutes) 90 minutes   PT Cumulative Minutes 1815   Therapy Time missed   Time missed?  No

## 2023-01-10 NOTE — CASE MANAGEMENT
[facility] accepted pt and soc will be 1/11  Info placed on dc instructions  Team made aware    pt is expected to return home today

## 2023-01-10 NOTE — PROGRESS NOTES
PM&R PROGRESS NOTE:  Stephania Range 61 y o  male MRN: [id number]  Unit/Bed#: -10 Encounter: [id number]        Rehabilitation Diagnosis: Impairment of mobility, safety and Activities of Daily Living (ADLs) due to Neurologic Conditions:  03 8  Neuromuscular Disorders    HPI: Rosaura Wall is a 61 y o  male with a medical history of HTN, HLD, GERD, and ventral hernia that presented to [facility] on 11/7 for an elective surgical procedure d/t metastatic colon adenocarcinoma by Dr Mark Anthony Hanley  Patient presented to hem/onc 2/22 after CT abdomen showed transverse colonic apple core lesion and innumerable hepatic metastases  MRI revealed multiple hepatic metastasis with smaller lesions in left lobe and larger lesions in right lobe  Patient completed chemotherapy, colostomy at that time  On 11/7 patient underwent exploratory laparotomy, segment 3 liver resection, ablation, intraoperative ultrasound, and colostomy reversal  This was c/b left upper quadrant hematoma, imaging showed suspected leak from transverse colon anastomosis  On 11/16, patient underwent exp lap which revealed LUQ infected hematoma, defect in transverse colon anastomosis with extravasation of succus  Massively dilated cecum requiring resection  He had a right hemicolectomy, left in discontinuity and temporary abdominal closure  On 11/17, re-exploration, partial descending colectomy, primary colonic anastomosis created with diverting loop ileostomy and midline wound VAC placement  He completed course of IV abx for ESBL bacteremia from abdominal abscess  Midline abdominal incision wet to dry dressings and packing to old stoma site  12/6 abdominal incision noted to have yellow drainage  CT abdomen showed abdominal abscess and distended gallbladder  Patient underwent percutaneous cholecystostomy tube insertion and hepatectomy abscess drainage  ID consulted, IV abx for 1 week  Nephrology consulted d/t ALEXY, creatinine baseline 1 2-1 4   On 12/14 patient experienced chest pain, appeared musculoskeletal with no further work-up  Patient deemed medically stable and admitted to [facility] on 12/14/2022  SUBJECTIVE: Patient seen face to face  Patient reports chills overnight and yesterday afternoon  He feels it is because he perspires  Patient has been afebrile, normotensive, intermittent tachycardia  Denies nausea, appetite improved  Pain is adequate controlled  Progressing as expected in rehabilitation  No SOB, CP, fever, N/V, abdominal pain  ASSESSMENT: Stable, progressing    PLAN:  1  Chills- Leukocytosis 16 66 (previous 13 34)  ID aware, CT abdomen and pelvis with contrast ordered for 01/11  2  Creatinine 1 44, IVF NSS 75 mL for hydration  3  Nausea and appetite improved, continue with current regimen  4  Pain- well controlled, no changes  5  Continue to monitor incision for signs of infection  6  Monitor VICTORINO drains change in color and increased/decreased drainage  Rehabilitation  • Functional deficits:  Decreased foot clearance, shuffle gait  • Continue current rehabilitation plan of care to maximize function      • Functional update:   · PT: mobility-supervision-min A, transfers- supervision, ambulation- supervision 250' RW, w/c mobility- supervision 150'  · OT:  ADL- supervision, toilet hygiene min A   · Estimated Discharge: TBD with home PT, OT, RN    Pain  • Tylenol, oxycodone    DVT prophylaxis  • Heparin sc    Bladder plan  • Continent    Bowel plan  • Ileostomy     * Malignant neoplasm of transverse colon (HCC)  Assessment & Plan  · Transverse colonic apple core lesion and innumerable hepatic metastases  · S/p Colostomy placement 2/22  · RCW port-a-cath, accessed  · S/p palliative chemo  · Follows with Dr Jenifer Ruby (palliative)  · Follows hem/onc (Dr Tiffanie Gomez)    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 liver [facility])  Assessment & Plan  · MRI-multiple hepatic metastasis; smaller lesions in left lobe, larger lesions in right lobe  · 11/7 Exp Lap, resection of hepatic segment 3, resection of transverse colon with reversal of loop colostomy (Dr Geovanna Rosen)  · 11/16- right hemicolectomy, left discontinuity and temporary abdominal closure device placed  · 11/17- re-exploration, partial descending colectomy, primary colocolonic anastomosis with diverting loop ileostomy, midline VAC placement  · CT showed abdominal abscess and distended gallbladder  · 12/8- IR percutaneous cholecystostomy tube, hepatectomy abscess drain placement  ·  IR on 12/20 additional drains placed for a perisplenic abscess as well as a splenic recess abscess  · Total of 3 drains in place  Elevated alkaline phosphatase level  Assessment & Plan  1125, (previous 1025, 1315)  Hepatic mets  Follow level  ?bone involvement - no evidence of ROM limitations or pain complaints on survey  Follow up with Surg Onc    Dehiscence of incision  Assessment & Plan  Improving  Wound Care following and surgical oncology following   [facility] present  Surgical oncology- no signs of infection, recommend Santyl, ABD, paper tape     [address] as follows:   Skin Care Plan:  1-Midline Incision and Right Abdomen Wounds: Cleanse wounds with NS and pat dry  Apply Santyl to slough tissue on wound bed nickel thick  Cover with ABD and secure with minimal tape  Change daily or PRN soilage or displacement  2-Turn/reposition q2h or when medically stable for pressure re-distribution on skin   3-Elevate heels to offload pressure  4-Moisturize skin daily with skin nourishing cream  5-Ehob cushion in chair when out of bed  6-Preventative Hydraguard to bilateral heels and bilateral sacro-buttocks BID and PRN          Leukocytosis  Assessment & Plan  · WBC 16 66 (previous 13 34, 12 41)  · ID recommend- repeat CT abd/pelvis w/ contrast  · Afebrile  · Monitor temps      Acute pain  Assessment & Plan  · Managed on Scheduled Tylenol 975 mg [id number]  · Managed on Robaxin 500 mg BId  · Oxycodone IR PRN  · Managed well    Abscess  Assessment & Plan  · Abdominal abscess- ESBL E Coli - Contact precautions  · Zosyn completed  · Completed Ertapenem course 12/28/22  · Starting Doxycycline 100 mg Q12h 12/30/22 per ID consultants    · Doxycycline stopped, Ertapenem restarted complete 01/07    · 3 drains placed by IR  · Monitor 3 drain output:  · Abscess drain abdomen A: 0 cc   · Abscess drain back: 0-10 cc  · Abscess drain abdomen B: 15 - 20cc    1/3- tube check w/ IR  Tube position check  Patient having some discomfort in left abdomen while supine - per IR tubes in place, bulbs changed, recheck tube placement in 2 weeks  1/4- drain #3 developed sanguineous drainage with flushing, IR evaluated - flushing appropriately  Continue daily flushes on all the drains and monitor output  Reach out to IR if drainage is <10 cc per day for 2 days  Sepsis ([address] )  Assessment & Plan  · SIRS versus sepsis at this time given his vitals, leukocytosis, and complicated infection history  · Mgmt per ID, IM  · 12/20-IR perisplenic, perigastric drain placement for 2 additional abscesses-  · Perigastric abscess +E  Coli ESBL  · Blood cultures negative     Acute on chronic kidney failure [facility])  Assessment & Plan  · Creatinine baseline 1 2-1 4  · Cr = 1 44  · Monitor BMP    Bacteremia  Assessment & Plan  · Abx completed 01/07  · Monitor CBC    Iron deficiency anemia, unspecified  Assessment & Plan  · Hbg = 7 9 (previous 8 0)  · 12/15-1 unit PRBc  · 12/16- symptomatic- 1 unit PRBc  · 12/28- 1 unit PRBc  · Monitor CBC    Obesity (BMI 30-39  9)  Assessment & Plan  · BMI 33 0  · Current BMI 26 98  · Nutrition consult      Benign essential hypertension  Assessment & Plan  · Home: Norvasc 5 mg BID  · Here: Norvasc 5 mg BID  · Adjust medication as needed    Type 2 diabetes mellitus without complication, with long-term current use of insulin ([facility])  Assessment & Plan  · HbgA1c 8 0 (9/22)  · Home monitoring with [facility]  · Home: Lantus 12 units, Glyburide, Januvia, Metformin  · Here: Lantus 10 units,  Humalog 4 units with meals, SSI   · Follow with PCP (Dr Omar Clark)      [address] consultants medical co-management  Labs, medications, and imaging reviewed  ROS:  Review of Systems   A 10 point review of systems was negative except for what is noted in the HPI  OBJECTIVE:   /58 (BP Location: Right arm)   Pulse (!) 110   Temp 98 7 °F (37 1 °C) (Oral)   Resp 18   Ht 5' 10" (1 778 m)   Wt 85 3 kg (188 lb)   SpO2 98%   BMI 26 98 kg/m²     Physical Exam  Constitutional:       Appearance: Normal appearance  HENT:      Head: Normocephalic and atraumatic  Mouth/Throat:      Mouth: Mucous membranes are moist    Cardiovascular:      Rate and Rhythm: Normal rate and regular rhythm  Pulses: Normal pulses  Heart sounds: Normal heart sounds  Pulmonary:      Effort: Pulmonary effort is normal       Breath sounds: Normal breath sounds  Abdominal:      General: Bowel sounds are normal       Palpations: Abdomen is soft  Tenderness: There is abdominal tenderness  Musculoskeletal:         General: Normal range of motion  Cervical back: Normal range of motion  Skin:     General: Skin is warm and dry  Capillary Refill: Capillary refill takes less than 2 seconds  Neurological:      Mental Status: He is alert and oriented to person, place, and time     Psychiatric:         Mood and Affect: Mood normal          Judgment: Judgment normal         Lab Results   Component Value Date    WBC 16 66 (H) 01/10/2023    HGB 7 9 (L) 01/10/2023    HCT 25 2 (L) 01/10/2023    MCV 84 01/10/2023     01/10/2023     Lab Results   Component Value Date    SODIUM 134 (L) 01/10/2023    K 4 5 01/10/2023     01/10/2023    CO2 20 (L) 01/10/2023    BUN 25 01/10/2023    CREATININE 1 44 (H) 01/10/2023    GLUC 345 (H) 01/10/2023    CALCIUM 8 5 01/10/2023     Lab Results   Component Value Date    INR 1 12 12/19/2022    INR 1 10 11/12/2022    INR 1 24 (H) 11/09/2022    PROTIME 14 7 (H) 12/19/2022    PROTIME 14 4 11/12/2022    PROTIME 15 8 (H) 11/09/2022       Current Facility-Administered Medications:   •  acetaminophen (TYLENOL) tablet 975 mg, 975 mg, Oral, Q8H [address], GAURANG Keith, 975 mg at 01/10/23 1324  •  amLODIPine (NORVASC) tablet 5 mg, 5 mg, Oral, BID, GAURANG Keith, 5 mg at 01/10/23 0809  •  aspirin chewable tablet 81 mg, 81 mg, Oral, Daily, GAURANG Keith, 81 mg at 01/10/23 0809  •  atorvastatin (LIPITOR) tablet 40 mg, 40 mg, Oral, Daily, GAURANG Keith, 40 mg at 01/10/23 0809  •  calcium carbonate (TUMS) chewable tablet 500 mg, 500 mg, Oral, BID PRN, GAURANG Keith, 500 mg at 01/06/23 1402  •  cholecalciferol (VITAMIN D3) tablet 400 Units, 400 Units, Oral, Daily, GAURANG Keith, 400 Units at 01/10/23 0810  •  collagenase (SANTYL) ointment, , Topical, Daily, Lazaro Goodwin MD, Given at 01/10/23 9584  •  heparin (porcine) subcutaneous injection 5,000 Units, 5,000 Units, Subcutaneous, Q8H [address], GAURANG Keith, 5,000 Units at 01/10/23 1324  •  insulin glargine (LANTUS) subcutaneous injection 10 Units 0 1 mL, 10 Units, Subcutaneous, HS, GAURANG Sales, 10 Units at 01/09/23 2223  •  insulin lispro (HumaLOG) 100 units/mL subcutaneous injection 1-5 Units, 1-5 Units, Subcutaneous, HS, GAURANG Keith, 1 Units at 01/08/23 2141  •  insulin lispro (HumaLOG) 100 units/mL subcutaneous injection 1-6 Units, 1-6 Units, Subcutaneous, TID AC, 5 Units at 01/10/23 1137 **AND** Fingerstick Glucose (POCT), , , TID AC, GAURANG Keith  •  insulin lispro (HumaLOG) 100 units/mL subcutaneous injection 4 Units, 4 Units, Subcutaneous, TID With Meals, Phyllis Mask, GAURANG, 4 Units at 01/10/23 1228  •  iohexol (OMNIPAQUE) 350 MG/ML injection (SINGLE-DOSE) 50 mL, 50 mL, Intravenous, Once in imaging, Negro Ge MD  •  melatonin tablet 3 mg, 3 mg, Oral, HS, GAURANG Keith, 3 mg at 01/09/23 2223  • methocarbamol (ROBAXIN) tablet 500 mg, 500 mg, Oral, BID, GAURANG Keith, 500 mg at 01/10/23 0809  •  multivitamin-minerals (CENTRUM) tablet 1 tablet, 1 tablet, Oral, Daily, GAURANG Keith, 1 tablet at 01/10/23 0808  •  ondansetron (ZOFRAN-ODT) dispersible tablet 4 mg, 4 mg, Oral, Q6H PRN, GAURANG Keith, 4 mg at 01/06/23 1402  •  ondansetron (ZOFRAN-ODT) dispersible tablet 4 mg, 4 mg, Oral, BID, Gerrianne Buerger Harleman, CRNP, 4 mg at 01/09/23 2223  •  oxyCODONE (ROXICODONE) immediate release tablet 10 mg, 10 mg, Oral, Q6H PRN, GAURANG Keith, 10 mg at 01/09/23 2358  •  oxyCODONE (ROXICODONE) IR tablet 2 5 mg, 2 5 mg, Oral, Q3H PRN, GAURANG Keith, 2 5 mg at 12/25/22 1911  •  oxyCODONE (ROXICODONE) IR tablet 5 mg, 5 mg, Oral, Q6H PRN, GAURANG Keith, 5 mg at 01/08/23 1331  •  pantoprazole (PROTONIX) EC tablet 40 mg, 40 mg, Oral, BID AC, GAURANG Keith, 40 mg at 01/10/23 8323  •  simethicone (MYLICON) chewable tablet 80 mg, 80 mg, Oral, 4x Daily (with meals and at bedtime), GAURANG Keith, 80 mg at 01/10/23 1227  •  sodium chloride 0 9 % infusion, 75 mL/hr, Intravenous, Continuous, Gerrianne Buerger Harleman, CRNP, Last Rate: 75 mL/hr at 01/10/23 1300, 75 mL/hr at 01/10/23 1300  •  tamsulosin (FLOMAX) capsule 0 4 mg, 0 4 mg, Oral, Daily With Dinner, GAURANG George, 0 4 mg at 01/09/23 1721    Past Medical History:   Diagnosis Date   • Abdominal pain 03/12/2022   • Acute renal failure ([place] [address] )     [id number] resolved   • Cancer ([place] [address] )    • Diabetes mellitus ([place] [address] )    • Enteritis 08/23/2016   • Gastroparesis due to DM ([place] [address] ) 08/23/2016   • GERD (gastroesophageal reflux disease)    • Hernia, ventral 08/04/2016   • Hyperlipidemia    • Hypertension    • Morbid obesity ([place] [address] ) 04/17/2018   • Postoperative visit 03/02/2022   • SIRS (systemic inflammatory response syndrome) ([place] [address] ) 03/12/2022   • Snoring    • Stage 3a chronic kidney disease ([place] [address] ) 02/19/2022       Patient Active Problem List Diagnosis Date Noted   • Malignant neoplasm of transverse colon ([facility] ) 03/01/2022   • 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 liver ([place] [address] ) 03/02/2022   • Dehiscence of incision 12/30/2022   • Elevated alkaline phosphatase level 12/30/2022   • Leukocytosis 12/29/2022   • Abscess 12/27/2022   • Acute pain 12/27/2022   • Sepsis ([place] [address] ) 12/17/2022   • Severe protein-calorie malnutrition ([place] [address] ) 12/14/2022   • Acute on chronic kidney failure ([facility] ) 12/14/2022   • Flash pulmonary edema ([place] [address] ) 11/12/2022   • MR (mitral regurgitation) 11/12/2022   • Bacteremia 11/12/2022   • ESBL (extended spectrum beta-lactamase) producing bacteria infection 11/12/2022   • Acute respiratory failure with hypoxia ([place] [address] ) 11/12/2022   • Encephalopathy 11/09/2022   • Cervical radiculopathy 10/26/2022   • Other fatigue 06/15/2022   • Colostomy prolapse ([place] [address] ) 05/27/2022   • Colon cancer metastasized to liver ([place] [address] ) 03/12/2022   • Iron deficiency anemia, unspecified 03/01/2022   • Thrombocytosis 02/21/2022   • Transaminitis 02/19/2022   • Type 2 diabetes mellitus with hyperlipidemia ([place] [address] ) 02/18/2022   • Microcytic anemia 02/18/2022   • Left ureteral calculus 01/30/2020   • Incarcerated umbilical hernia 21/46/4455   • Testicular hypogonadism 06/19/2017   • Low testosterone 05/30/2017   • Type 2 diabetes mellitus without complication, with long-term current use of insulin ([place] [address] ) 08/23/2016   • Benign essential hypertension 08/23/2016   • Mixed hyperlipidemia 08/23/2016   • Erectile dysfunction 07/11/2016   • Obesity (BMI 30-39 9) 07/11/2016      GAURANG Lopez  Physical Medicine and Chris Luu    Total visit time: 30 minutes, with more than 50% spent counseling/coordinating care  Counseling includes discussion with patient re: progress in therapies, functional issues observed by therapy staff, and discussion with patient regarding their current medical state and wellbeing   Coordination of patient's care was performed in conjunction with Internal Medicine service to monitor patient's labs, vitals, and management of their comorbidities

## 2023-01-10 NOTE — PROGRESS NOTES
Progress Note - Infectious Disease   Drenda Po 61 y o  male MRN: [id number]  Unit/Bed#: -01 Encounter: [id number]      Impression/Recommendations:  Sepsis     Evolving 12/17:  WBC, HR   Suspect due to persistent left intra-abdominal infection in setting of complex history below, recently completed antibiotics   ROS and exam otherwise negative   Recent Flu/RSV/COVID PCR, CXR negative   Blood cultures negative   Improved with reinitiation of antibiotics, additional drain placement  Recurrent sweats, WBC in setting of D/C of antibiotics, decreased drain output  Consider ongoing +/- increased intraabdominal collections  Rec:  • Continue to follow closely off antibiotics as below  • Follow drain output as below  • Follow temperatures closely  • Recheck CBC in AM  • Check repeat CT A/P as below  • Supportive care as per the primary service  • If clinically deteriorates, restart ertapenem 1g IV Q24     Intra-abdominal abscess     In the setting complex surgical history as below   Initially developed 11/2022 accompanied by ESBL E  coli bacteremia   Status post exploratory laparotomy, right hemicolectomy, temporary abdominal closure 11/16; re-exploration, partial left colectomy, primary ileocolonic anastomosis with proximal diverting loop ileostomy, midline fascial closure on 11/17   More recently developed abscess in hepatectomy bed, also collection +/- leak at area of prior colonic anastamosis, 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   Status post IR drain into perihepatic collection 12/8   Cultures with ESBL E  Coli   Status post 7 days ertapenem after drain placement through 12/15   Developed recurrent sepsis and repeat CT 12/17 shows hepatic bed collection improved, persistent left intra-abdominal collection   Status post perigastic, perisplenic drains 12/20   Cultures again with ESBL E  Coli   Status post 21 days total antibiotics    Recurrent WBC as above in setting of decreased drain output  Rec:  • Continue to follow closely off antibiotics  • Continue drains per IR  • Check repeat CT A/P with contrast     Possible acute cholecystitis     Status post percutaneous cholecystostomy tube   Alk phos remains markedly elevated, possibly due to drain itself versus known intrahepatic metastases      Metastatic colon cancer   To liver, possible lung   Status post resection, chemotherapy, more recent hepatic resection and ablation, transverse colon resection      CKD   Baseline Cr 1 4       DM      Anemia   Status post multiple transfusions      The above plan was discussed in detail with Salo Hernandez with IM      Antibiotics:  Off antibiotics #3    Subjective:  Patient seen on AM rounds  Feels good and eager to go home  Has sweats overnight  24 Hour Events:  Chills and sweats noted overnight  WBC sent later this AM after my exam and resulted at 16  Minimal drain output noted over past 5 days  Objective:  Vitals:  Temp:  [97 7 °F (36 5 °C)-99 3 °F (37 4 °C)] 98 7 °F (37 1 °C)  HR:  [102-110] 110  Resp:  [18-20] 18  BP: (138)/(58-78) 138/58  SpO2:  [96 %-99 %] 98 %  Temp (24hrs), Av 6 °F (37 °C), Min:97 7 °F (36 5 °C), Max:99 3 °F (37 4 °C)  Current: Temperature: 98 7 °F (37 1 °C)    Physical Exam:   General:  No acute distress  Psychiatric:  Awake and alert  Pulmonary:  Normal respiratory excursion without accessory muscle use  Abdomen:  Soft, midline VICTORINO with milky fluid, left JPs x2 with serosanguinous fluid  Extremities:  No edema  Skin:  No rashes    Lab Results:  I have personally reviewed pertinent labs    Results from last 7 days   Lab Units 01/10/23  0959 23  0533 23  0618   POTASSIUM mmol/L 4 5 4 0 3 7   CHLORIDE mmol/L 108 109* 107   CO2 mmol/L 20* 20* 20*   BUN mg/dL 25 24 20   CREATININE mg/dL 1 44* 1 32* 1 13   EGFR ml/min/1 73sq m 53 59 71   CALCIUM mg/dL 8 5 8 5 8 7   AST U/L  --  45 43   ALT U/L  --  13 12   ALK PHOS U/L  --  1,120* 1,025*     Results from last 7 days Lab Units 01/10/23  0959 01/09/23  0533 01/06/23  0618   WBC Thousand/uL 16 66* 13 34* 12 41*   HEMOGLOBIN g/dL 7 9* 8 0* 8 2*   PLATELETS Thousands/uL 383 430* 480*           Imaging Studies:   I have personally reviewed pertinent imaging study reports and images in PACS  EKG, Pathology, and Other Studies:   I have personally reviewed pertinent reports

## 2023-01-10 NOTE — CASE MANAGEMENT
pts dc cancelled due to abnormal labs  [facility] home health made aware via nick Gonzales faxed clinical update to claudia at [facility] via [employer] [phone], remarking no coorespondence since 12/20/2022  Awaiting determination and clearance for dc to home

## 2023-01-10 NOTE — PROGRESS NOTES
Internal Medicine Progress Note  Patient: Yas Lu  Age/sex: 61 y o  male  Medical Record #: [id number]      ASSESSMENT/PLAN: (Interval History)  Yas Lu is seen and examined and management for following issues:    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  • s/p hepatic resection and reversal of colostomy on 11/7  • s/p right hemicolectomy with partial descending colectomy colocolonic anastomosis with loop ileostomy and mid line abdominal VAC placement 11/16  • Continue local wound care; WC following  • CT  Abd/pelvis 12/17 = loculated collection along splenic recess   Has perisplenic hilum collection as well --> to IR 12/20 for drain placement in both perigastric and perisplenic area and cx sent from both areas = Ecoli  • Flush drains with 10ml qd; drainage 5/20/20 on 1/9  • S-O following/Dr Aman Hannah was in to see pt 1/2/23  • Tube check (incl perc alvina tube) done 1/3 = unchanged with still some fluid around each drain so kept in   Next tube check 2 weeks @1/17  • Bloody drainage in VICTORINO #3 and IR in to see pt and felt 2/2 tube manipulation when he went for his tube check     Acute cholecystitis  • s/p percutaneous tube placement 12/8 with tube check on 12/12, 12/14  • GI saw due to alk phos elevation = felt 2/2 infiltrative disease rather than focal biliary obstruction   AMA was negative     • Alkaline phosphatase isoenzyme sent 12/17 (48% liver/52% bone) = GI  recommended continuing to monitor his LFTs and defer to surgical oncology for further w/u and plan      • TB improving = to consider MRCP if total bili trends back up but was normal 12/30  • Alvina tube = 70 ml 1/9/23       • Per GI, do cholangiogram with next tube check --> wasn't done 1/3 and GI aware  • GI wrote note 1/4 = aware labs incl GGT of 1992 (previously 1619 on 12/17)     Bacteremia/abdominal abscess  • ID following   • s/p Ertapenem was switched to Doxycycline per ID as of 12/30 but had to be switched back 2/2 nausea  • Ertapenem completed    • Had chills yesterday afternoon and this AM  • Today WBC up to 16 = D/W Dr Mortimer Banister and to get CT abd/pelvis with PO/IV contrast 1/11/23 but hydrate today with IVF first since creat is 1 4  • CBC AM     Hypertension  • On Norvasc 5mg BID  • stable     Diabetes mellitus  • Home:  Glargine 35U qam/Glyburide-Metformin 5-500 qam/Januvia 100mg qam  • Here:  Lantus 10U qhs/Lispro 6U TID  • Lispro to be held if pt does not eat  • Has a DEXCOM meter at home  • Continue DM diet and QID Accuchecks/SSI  • Eating/meal completion is erratic and therefore BSs difficult   He has however done better in last 3 days and has gotten Lispro WM 6 U  • Will reduce Lispro WM to 4U to let him have a slightly higher BS and more room in case doesn't eat much at home     Acute on chronic renal failure  • Stage III; baseline 1 2-1 4  • Lisinopril currently on hold  • s/p NSS IVF with improvement of creat =  back down to 1 2 12/30  • was 1 32 on 1/9/23, now 1 4  • To get IVF now since for CT with contrast 1/11/23     Anemia  • Multifactorial due to recent infection, surgery, CKD stage III  • Transfused on 12/15, 12/16/22 and 12/30  • Hemoglobin stable at 8 0 on 1/9/23     Atypical chest pain  • With elevation in troponin/EKG changes on acute side  • Cardiology saw then and cleared pt for discharge  • Did not recommend any further work up  • No further chest pain     Situational depression  • Neuropsychology consulted  • Patient not interested in medications at this point in time     Malnutrition  • Glucerna makes him nauseated  • On 12/15, ordered double protein     Pleural effusion  • CXR on 12/14 showed small left pleural effusion and was suspect for CHF  • ECHO 11/9/22 = LVEF 55%, unable to assess diastolic function, mild TR     L sided pleuritic pain  • located near drain site and per patient only occurs at night  • No evidence of infection        Discharge date:  1/9/23       The above assessment and plan was reviewed and updated as determined by my evaluation of the patient on 1/10/2023      Labs:   Results from last 7 days   Lab Units 01/10/23  0959 01/09/23  0533   WBC Thousand/uL 16 66* 13 34*   HEMOGLOBIN g/dL 7 9* 8 0*   HEMATOCRIT % 25 2* 25 9*   PLATELETS Thousands/uL 383 430*     Results from last 7 days   Lab Units 01/10/23  0959 01/09/23  0533   SODIUM mmol/L 134* 136   POTASSIUM mmol/L 4 5 4 0   CHLORIDE mmol/L 108 109*   CO2 mmol/L 20* 20*   BUN mg/dL 25 24   CREATININE mg/dL 1 44* 1 32*   CALCIUM mg/dL 8 5 8 5             Results from last 7 days   Lab Units 01/10/23  1109 01/10/23  0630 01/09/23  2114   POC GLUCOSE mg/dl 335* 128 130       Review of Scheduled Meds:  Current Facility-Administered Medications   Medication Dose Route Frequency Provider Last Rate   • acetaminophen  975 mg Oral Q8H [facility] GAURANG Keith     • amLODIPine  5 mg Oral BID GAURANG Keith     • aspirin  81 mg Oral Daily GAURANG Keith     • atorvastatin  40 mg Oral Daily GAURANG Keith     • calcium carbonate  500 mg Oral BID PRN GAURANG Menon     • cholecalciferol  400 Units Oral Daily GAURANG Keith     • collagenase   Topical Daily Hali Santos MD     • heparin (porcine)  5,000 Units Subcutaneous Q8H [facility] GAURANG Keith     • insulin glargine  10 Units Subcutaneous HS GAURANG Quintero     • insulin lispro  1-5 Units Subcutaneous HS GAURANG Keith     • insulin lispro  1-6 Units Subcutaneous TID AC GAURANG Keith     • insulin lispro  4 Units Subcutaneous TID With Meals GAURANG Quintero     • iohexol  50 mL Intravenous Once in imaging Alana Hill MD     • melatonin  3 mg Oral HS GAURANG Keith     • methocarbamol  500 mg Oral BID GAURANG Menon     • multivitamin-minerals  1 tablet Oral Daily GAURANG Keith     • ondansetron  4 mg Oral Q6H PRN GAURANG Keith     • ondansetron  4 mg Oral BID GAURANG Oglesby     • oxyCODONE 10 mg Oral Q6H PRN Maceo Lanes, CRNP     • oxyCODONE  2 5 mg Oral Q3H PRN Maceo Lanes, CRNP     • oxyCODONE  5 mg Oral Q6H PRN GAURANG Keith     • pantoprazole  40 mg Oral BID AC GAURANG Keith     • simethicone  80 mg Oral 4x Daily (with meals and at bedtime) Maceo Lanes, CRNP     • tamsulosin  0 4 mg Oral Daily With Dinner Maceo Lanes, CRNP         Subjective/ HPI: Patient seen and examined  Patients overnight issues or events were reviewed with nursing or staff during rounds or morning huddle session  New or overnight issues include the following:     Had sweating episode this AM but no fever  Offers no complaints  Ate well this AM     ROS:   A 10 point ROS was performed; negative except as noted above  *Labs /Radiology studies reviewed  *Medications reviewed and reconciled as needed  *Please refer to order section for additional ordered labs studies  *Case discussed with primary attending during morning huddle case rounds    Physical Examination:  Vitals:   Vitals:    01/09/23 1430 01/09/23 1457 01/09/23 2100 01/10/23 0643   BP:  138/78 138/58    BP Location:  Left arm Right arm    Pulse: (!) 109  102 (!) 110   Resp: 20  18 18   Temp: 97 7 °F (36 5 °C)  99 3 °F (37 4 °C) 98 7 °F (37 1 °C)   TempSrc: Oral  Oral Oral   SpO2: 96%  99% 98%   Weight:       Height:           General Appearance: no distress, conversive  HEENT: PERRLA, conjuctiva normal; oropharynx clear; mucous membranes moist   Neck:  Supple, normal ROM  Lungs: CTA, normal respiratory effort, no retractions, expiratory effort normal  CV: regular rate and rhythm; no rubs/murmurs/gallops, PMI normal   ABD: soft; ND/NT; +BS  Per radha tube is mostly bilious with slight blood    VICTORINO drains as documented in flowsheet   EXT: no edema  Skin: normal turgor, normal texture, no rashes  Psych: affect normal, mood normal  Neuro: AAO     The above physical exam was reviewed and updated as determined by my evaluation of the patient on 1/10/2023  Invasive Devices     Central Venous Catheter Line  Duration           Port A Cath 03/10/22 Right Chest 306 days          Drain  Duration           Ileostomy LUQ 53 days    Abscess Drain Abdomen 32 days    Cholecystostomy Tube 28 days    Abscess Drain Abdomen 20 days    Abscess Drain Back 20 days                   VTE Pharmacologic Prophylaxis: Heparin  Code Status: Level 1 - Full Code  Current Length of Stay: 27 day(s)      Total time spent:  30 minutes with more than 50% spent counseling/coordinating care  Counseling includes discussion with patient re: progress  and discussion with patient of his/her current medical state/information  Coordination of patient's care was performed in conjunction with primary service  Time invested included review of patient's labs, vitals, and management of their comorbidities with continued monitoring  In addition, this patient was discussed with medical team including physician and advanced extenders  The care of the patient was extensively discussed and appropriate treatment plan was formulated unique for this patient  Medical decision making for the day was made by supervising physician unless otherwise noted in their attestation statement  ** Please Note:  voice to text software may have been used in the creation of this document   Although proof errors in transcription or interpretation are a potential of such software**

## 2023-01-11 ENCOUNTER — APPOINTMENT (INPATIENT)
Dept: RADIOLOGY | Facility: HOSPITAL | Age: 58
End: 2023-01-11

## 2023-01-11 LAB
ANION GAP SERPL CALCULATED.3IONS-SCNC: 8 MMOL/L (ref 4–13)
BASOPHILS # BLD AUTO: 0.07 THOUSANDS/ÂΜL (ref 0–0.1)
BASOPHILS NFR BLD AUTO: 0 % (ref 0–1)
BUN SERPL-MCNC: 20 MG/DL (ref 5–25)
CALCIUM SERPL-MCNC: 8.7 MG/DL (ref 8.3–10.1)
CHLORIDE SERPL-SCNC: 110 MMOL/L (ref 96–108)
CO2 SERPL-SCNC: 20 MMOL/L (ref 21–32)
CREAT SERPL-MCNC: 1.16 MG/DL (ref 0.6–1.3)
EOSINOPHIL # BLD AUTO: 0 THOUSAND/ÂΜL (ref 0–0.61)
EOSINOPHIL NFR BLD AUTO: 0 % (ref 0–6)
ERYTHROCYTE [DISTWIDTH] IN BLOOD BY AUTOMATED COUNT: 17.7 % (ref 11.6–15.1)
GFR SERPL CREATININE-BSD FRML MDRD: 69 ML/MIN/1.73SQ M
GLUCOSE SERPL-MCNC: 120 MG/DL (ref 65–140)
GLUCOSE SERPL-MCNC: 153 MG/DL (ref 65–140)
GLUCOSE SERPL-MCNC: 159 MG/DL (ref 65–140)
GLUCOSE SERPL-MCNC: 159 MG/DL (ref 65–140)
GLUCOSE SERPL-MCNC: 193 MG/DL (ref 65–140)
HCT VFR BLD AUTO: 25.4 % (ref 36.5–49.3)
HGB BLD-MCNC: 7.9 G/DL (ref 12–17)
IMM GRANULOCYTES # BLD AUTO: 0.26 THOUSAND/UL (ref 0–0.2)
IMM GRANULOCYTES NFR BLD AUTO: 2 % (ref 0–2)
LYMPHOCYTES # BLD AUTO: 2.11 THOUSANDS/ÂΜL (ref 0.6–4.47)
LYMPHOCYTES NFR BLD AUTO: 12 % (ref 14–44)
MCH RBC QN AUTO: 26 PG (ref 26.8–34.3)
MCHC RBC AUTO-ENTMCNC: 31.1 G/DL (ref 31.4–37.4)
MCV RBC AUTO: 84 FL (ref 82–98)
MONOCYTES # BLD AUTO: 1.89 THOUSAND/ÂΜL (ref 0.17–1.22)
MONOCYTES NFR BLD AUTO: 11 % (ref 4–12)
NEUTROPHILS # BLD AUTO: 12.74 THOUSANDS/ÂΜL (ref 1.85–7.62)
NEUTS SEG NFR BLD AUTO: 75 % (ref 43–75)
NRBC BLD AUTO-RTO: 0 /100 WBCS
PLATELET # BLD AUTO: 388 THOUSANDS/UL (ref 149–390)
PMV BLD AUTO: 9.8 FL (ref 8.9–12.7)
POTASSIUM SERPL-SCNC: 4.4 MMOL/L (ref 3.5–5.3)
RBC # BLD AUTO: 3.04 MILLION/UL (ref 3.88–5.62)
SODIUM SERPL-SCNC: 138 MMOL/L (ref 135–147)
WBC # BLD AUTO: 17.07 THOUSAND/UL (ref 4.31–10.16)

## 2023-01-11 RX ORDER — ONDANSETRON 2 MG/ML
4 INJECTION INTRAMUSCULAR; INTRAVENOUS ONCE
Status: COMPLETED | OUTPATIENT
Start: 2023-01-11 | End: 2023-01-11

## 2023-01-11 RX ORDER — INSULIN GLARGINE 100 [IU]/ML
10 INJECTION, SOLUTION SUBCUTANEOUS
Status: DISCONTINUED | OUTPATIENT
Start: 2023-01-12 | End: 2023-01-17 | Stop reason: HOSPADM

## 2023-01-11 RX ADMIN — ONDANSETRON 4 MG: 4 TABLET, ORALLY DISINTEGRATING ORAL at 19:37

## 2023-01-11 RX ADMIN — ERTAPENEM SODIUM 1000 MG: 1 INJECTION, POWDER, LYOPHILIZED, FOR SOLUTION INTRAMUSCULAR; INTRAVENOUS at 21:45

## 2023-01-11 RX ADMIN — PANTOPRAZOLE SODIUM 40 MG: 40 TABLET, DELAYED RELEASE ORAL at 05:56

## 2023-01-11 RX ADMIN — SIMETHICONE 80 MG: 80 TABLET, CHEWABLE ORAL at 21:46

## 2023-01-11 RX ADMIN — INSULIN LISPRO 2 UNITS: 100 INJECTION, SOLUTION INTRAVENOUS; SUBCUTANEOUS at 11:32

## 2023-01-11 RX ADMIN — AMLODIPINE BESYLATE 5 MG: 5 TABLET ORAL at 17:49

## 2023-01-11 RX ADMIN — COLLAGENASE SANTYL 1 APPLICATION: 250 OINTMENT TOPICAL at 08:23

## 2023-01-11 RX ADMIN — OXYCODONE HYDROCHLORIDE 5 MG: 5 TABLET ORAL at 17:58

## 2023-01-11 RX ADMIN — ACETAMINOPHEN 975 MG: 325 TABLET, FILM COATED ORAL at 21:46

## 2023-01-11 RX ADMIN — Medication 1 TABLET: at 08:17

## 2023-01-11 RX ADMIN — IOHEXOL 90 ML: 350 INJECTION, SOLUTION INTRAVENOUS at 14:01

## 2023-01-11 RX ADMIN — ASPIRIN 81 MG CHEWABLE TABLET 81 MG: 81 TABLET CHEWABLE at 08:17

## 2023-01-11 RX ADMIN — AMLODIPINE BESYLATE 5 MG: 5 TABLET ORAL at 08:17

## 2023-01-11 RX ADMIN — MELATONIN TAB 3 MG 3 MG: 3 TAB at 19:37

## 2023-01-11 RX ADMIN — ONDANSETRON 4 MG: 2 INJECTION INTRAMUSCULAR; INTRAVENOUS at 17:54

## 2023-01-11 RX ADMIN — ATORVASTATIN CALCIUM 40 MG: 40 TABLET, FILM COATED ORAL at 08:17

## 2023-01-11 RX ADMIN — INSULIN LISPRO 4 UNITS: 100 INJECTION, SOLUTION INTRAVENOUS; SUBCUTANEOUS at 08:21

## 2023-01-11 RX ADMIN — SIMETHICONE 80 MG: 80 TABLET, CHEWABLE ORAL at 08:16

## 2023-01-11 RX ADMIN — CHOLECALCIFEROL TAB 10 MCG (400 UNIT) 400 UNITS: 10 TAB at 08:20

## 2023-01-11 RX ADMIN — METHOCARBAMOL 500 MG: 500 TABLET ORAL at 08:17

## 2023-01-11 RX ADMIN — HEPARIN SODIUM 5000 UNITS: 5000 INJECTION INTRAVENOUS; SUBCUTANEOUS at 21:45

## 2023-01-11 RX ADMIN — HEPARIN SODIUM 5000 UNITS: 5000 INJECTION INTRAVENOUS; SUBCUTANEOUS at 17:49

## 2023-01-11 RX ADMIN — SODIUM CHLORIDE 75 ML/HR: 0.9 INJECTION, SOLUTION INTRAVENOUS at 02:47

## 2023-01-11 RX ADMIN — OXYCODONE HYDROCHLORIDE 10 MG: 10 TABLET ORAL at 23:52

## 2023-01-11 RX ADMIN — INSULIN LISPRO 1 UNITS: 100 INJECTION, SOLUTION INTRAVENOUS; SUBCUTANEOUS at 18:01

## 2023-01-11 RX ADMIN — METHOCARBAMOL 500 MG: 500 TABLET ORAL at 17:49

## 2023-01-11 RX ADMIN — ONDANSETRON 4 MG: 4 TABLET, ORALLY DISINTEGRATING ORAL at 15:03

## 2023-01-11 RX ADMIN — ACETAMINOPHEN 975 MG: 325 TABLET, FILM COATED ORAL at 05:57

## 2023-01-11 RX ADMIN — INSULIN LISPRO 1 UNITS: 100 INJECTION, SOLUTION INTRAVENOUS; SUBCUTANEOUS at 08:20

## 2023-01-11 RX ADMIN — HEPARIN SODIUM 5000 UNITS: 5000 INJECTION INTRAVENOUS; SUBCUTANEOUS at 05:57

## 2023-01-11 NOTE — TEAM CONFERENCE
Acute [facility] Conference Note  Date: 1/11/2023   Time: 10:45 AM       Patient Name:  Cari Gates       Medical Record Number: [id number]   YOB: 1965  Sex: Male          Room/Bed:  [facility] 964/[facility] 198-52  Payor Info:  Payor: Patricia Kings / Plan: Patricia Russo / Product Type: HMO Commercial /      Admitting Diagnosis: Critical illness myopathy [G72 81]   Admit Date/Time:  12/14/2022  3:18 PM  Admission Comments: No comment available     Primary Diagnosis:  Malignant neoplasm of transverse colon ([facility] [address] )  Principal Problem: Malignant neoplasm of transverse colon [facility])    Patient Active Problem List    Diagnosis Date Noted   • Dehiscence of incision 12/30/2022   • Elevated alkaline phosphatase level 12/30/2022   • Leukocytosis 12/29/2022   • Abscess 12/27/2022   • Acute pain 12/27/2022   • Sepsis ([facility] [address] ) 12/17/2022   • Severe protein-calorie malnutrition ([facility] [address] ) 12/14/2022   • Acute on chronic kidney failure ([facility] [address] ) 12/14/2022   • Flash pulmonary edema ([facility] [address] ) 11/12/2022   • MR (mitral regurgitation) 11/12/2022   • Bacteremia 11/12/2022   • ESBL (extended spectrum beta-lactamase) producing bacteria infection 11/12/2022   • Acute respiratory failure with hypoxia ([facility] [address] ) 11/12/2022   • Encephalopathy 11/09/2022   • Cervical radiculopathy 10/26/2022   • Other fatigue 06/15/2022   • Colostomy prolapse ([facility] [address] ) 05/27/2022   • Colon cancer metastasized to liver ([facility] [address] ) 03/12/2022   • 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 liver ([facility] [address] ) 03/02/2022   • Iron deficiency anemia, unspecified 03/01/2022   • Malignant neoplasm of transverse colon ([facility] [address] ) 03/01/2022   • Thrombocytosis 02/21/2022   • Transaminitis 02/19/2022   • Type 2 diabetes mellitus with hyperlipidemia ([facility] [address] ) 02/18/2022   • Microcytic anemia 02/18/2022   • Left ureteral calculus 01/30/2020   • Incarcerated umbilical hernia 59/45/2343   • Testicular hypogonadism 06/19/2017   • Low testosterone 05/30/2017   • Type 2 diabetes mellitus without complication, with long-term current use of insulin ([facility] [address] ) 08/23/2016   • Benign essential hypertension 08/23/2016   • Mixed hyperlipidemia 08/23/2016   • Erectile dysfunction 07/11/2016   • Obesity (BMI 30-39 9) 07/11/2016       Physical Therapy:    Weight Bearing Status: Full Weight Bearing  Transfers: Supervision  Bed Mobility: Supervision  Amulation Distance (ft): 150 feet  Ambulation: Supervision, Incidental Touching  Assistive Device for Ambulation: Roller Walker  Wheelchair Mobility Distance: 50 ft  Wheelchair Mobility: Supervision  Number of Stairs: 10  Assistive Device for Stairs: Bilateral [employer] (RW on curb step)  Stair Assistance: Incidental Touching  Discharge Recommendations: Home with:  [address] with[de-identified] Family Support, First Floor Setup, Home Physical Therapy        Patient making progress with skilled PT intervention thus far however still remains with barriers of medical deconditioning, pain, fatigue, LE weakness and imbalance  These deficits negatively impact patient's ability to engage in acute rehab program, however improving since evaluation  Patient has 2 vs 3 ADRIAN home and a FF to second floor  Pt is performing step but needs more strengthen and endurance to meet DC goals  FT needed before DC date and awaiting for team recommend  Pt did reports his getting all his DME for first floor step up  Tentative plan for discharge Monday; family training in progress  HHPT to be set up      1/10/23  Pt remains limited by inconsistency of act tolerance so cont recommendation is for pt to maintain WC level I at home and HH dist amb with RW with wife providing CS  Pt anticipates possible discharge home today with hospital bed, WC and RW, HHPT and HEP         Occupational Therapy:  Eating: Independent  Grooming: Independent  Bathing: Supervision  Bathing: Supervision  Upper Body Dressing: Supervision  Lower Body Dressing: Supervision  Toileting: Supervision  Tub/Shower Transfer:  (N/A at this time, multiple drains/lines)  Toilet Transfer: Supervision  Cognition: Within Defined Limits  Orientation: Person, Place, Time, Situation  Discharge Recommendations: Home with:  [address] with[de-identified] Family Support, Home Occupational Therapy       Occupational Therapy Weekly Team Note    Pt continues to make gradual progress towards OT goals  Family training has occurred last week with pt's wife in preparation for d/c  Both pt and wife feeling increasingly confident in regards to d/c plan  Pt continues to be limited by dec standing julia/balance, endurance, strength and act julia  He is currently functioning at an overall SUP level with RW for transfers and SUP level for basic self care  Will benefit from continued skilled OT services following POC with focus on above mentioned deficits to increase safety and independence with I/ADL tasks  D/C date: TBD        Speech Therapy:           No notes on file    Nursing Notes:  Appetite: Fair  Diet Type: Diabetic                      Diet Patient/Family Education Complete: Yes    Type of Wound (LDA):  Wound                    Type of Wound Patient/Family Education: Yes  Bladder: Continent     Bladder Patient/Family Education: Yes  Bowel: Ostomy  Ostomy Type: Ileostomy  Bowel Patient/Family Education: Yes  Pain Location/Orientation: Orientation: Left, Orientation: Lower, Location: Back  Pain Score: 0                       Hospital Pain Intervention(s): Medication (See MAR), Repositioned  Pain Patient/Family Education: Yes  Medication Management/Safety  Injectable: Insulin (heparin)  Safe Administration: Yes  Medication Patient/Family Education Complete: Yes (ongoing)    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 -s/p hepatic resection and reversal of colostomy on 11/7, s/p right hemicolectomy with partial descending colectomy colocolonic anastomosis with loop ileostomy and mid line abdominal VAC placement 11/16, Continue local wound care; WC following, CT  Abd/pelvis 12/17 = loculated collection along splenic es  Has perisplenic hilum collection as well --> to IR 12/20 for drain placement in both perigastric and perisplenic area and cx sent from both areas = Ecoli, Flush drains with 10ml qd, 1/1/23 VICTORINO drainage = 0ml, S-O following and Dr Ledy Ferguson was in to see pt 1/2/23, Tube check (incl perc alvina tube) done today this AM  Acute cholecystitis - s/p percutaneous tube placement 12/8 with tube check on 12/12, 12/14, GI saw due to alk phos elevation = felt 2/2 infiltrative disease rather than focal biliary obstruction  AMA was negative, Alkaline phosphatase isoenzyme sent 12/17 (48% liver/52% bone) = GI  recommended continuing to monitor his LFTs and defer to surgical oncology for further w/u and plan  Last 3 APs were [phone]  Will recheck 1/3, TB improving = to consider MRCP if total bili trends back up but was normal 12/30, Alvina tube = 20 ml 1/1/23  Drainage still has dark blood in it but seems to slowly becoming less, Perc alvina tube check done today 1/3, Did discuss alk phos levels with Dr Tisha Parsons 1/1/23 = stable but he suggested a cholangiogram be done with the tube check which was relayed to Dr Jamey Deluna over the weekend --> wasn't done today = d/w GI and they said do at next tube check in 2 weeks but will drop a note  They also wanted a GGT now  Bacteremia/abdominal abscess - ID following , s/p Ertapenem was switched to Doxycycline per ID as of 12/30, Blood cultures negative, CT abdomen and pelvis as above, + nausea since starting Doxy on 12/30  Lasts 1-2 hrs and was affecting appetite pt said, On 1/2/23, changed Zofran prn to be given scheduled pre-Doxy = was not effective however, Dr Pato Moreno changed back to Ertapenem today  Hypertension - On Norvasc 5mg BID, stable   Diabetes mellitus - On Lantus 10U qhs/Lispro 6U TID, Continue DM diet and QID Accuchecks/SSI, Increased Lantus to 10U qhs from 6U to start 12/31/22, Eating/meal completion is erratic and therefore BSs difficult, May have to consider stopping Lispro WM and increasing coverage scale instead  Acute on chronic renal failure - Stage III; baseline 1 2-1 4, Lisinopril currently on hold, s/p NSS IVF with improvement of creat =  back down to 1 2 12/30, Stable at 1 3 now  Anemia - Multifactorial due to recent infection, surgery, CKD stage III - Transfused on 12/15, 12/16/22 and 12/30, Hemoglobin 1/2/23 is stable at 8 1  Atypical chest pain - With elevation in troponin/EKG changes on acute side, Cardiology saw then and cleared pt for discharge, Did not recommend any further work up, No further chest pain  Situational depression - Neuropsychology consulted, Patient not interested in medications at this point in time  Malnutrition - Glucerna makes him nauseated, On 12/15, ordered double protein  Pleural effusion - CXR on 12/14 showed small left pleural effusion and was suspect for CHF, ECHO 11/9/22 = LVEF 55%, unable to assess diastolic function, mild TR  L sided pleuritic pain - located near drain site and per patient only occurs at night, No evidence of infection  1/11Pt awaiting discharge  Teaching reviewed w pt/family  Pt developed fever/chills  Blood cultures sent  IV ABx resumed IVF initiated    This week, we will continue to encourage independence with ADL's  We will continue to monitor labs and vital signs  We will continue to educate pt/family about repositioning to prevent skin breakdown  We will continue to monitor for constipation and medicate as ordered  We will continue to increase safety awareness with transfers and keep patient free from falls  Will continue to provide pt/family teaching with diabetic teaching  We will monitor incision(wound) for healing and s/s of healing  We will continue to monitor for adequate pain control  We will continue to teach pt/ family on care of ileostomy and drainage bags              Case Management:     Discharge Planning  Living Arrangements: Lives w/ Spouse/significant other, Lives w/ Children  Support Systems: Spouse/significant other, Son, Daughter  Assistance Needed: tbd  Type of Current Residence: Private residence  [address]: No  Pt was expected to return home Tuesday but due to abnormal labs pts dc was held  [facility] services are in place through traditional home health and dme has been delivered  Awaiting medical clearance for pt to return home  Is the patient actively participating in therapies? yes  List any modifications to the treatment plan:     Barriers Interventions   Recurrent sepsis On iv abx which will be needed for home                     Is the patient making expected progress toward goals? no  List any update or changes to goals:     Medical Goals: Patient will be medically stable for discharge to [facility] upon completion of rehab program and Patient will be able to manage medical conditions and comorbid conditions with medications and follow up upon completion of rehab program    Weekly Team Goals:   Rehab Team Goals  ADL Team Goal: Patient will require assist with ADLs with least restrictive device upon completion of rehab program  Bowel/Bladder Team Goal: Patient will require assist with bladder/bowel management with least restrictive device upon completion of rehab program  Transfer Team Goal: Patient will be independent with transfers with least restrictive device upon completion of rehab program  Locomotion Team Goal: Patient will be independent with locomotion with least restrictive device upon completion of rehab program    Discussion: pt was expected to return home yesterday and spiked a fever and elevated white count and creatnin  Pt now back on iv abx and will need on dc  Pt is for a ct scan today due to sepsis, depending on results pt may need to return to acute care  If not plan is for pt to return home this week with contd [facility] services  Pt is overall close supervision to hu depending on activity tolerance   Contact guard with transfers and mobility min a on stairs  c services in place    Anticipated Discharge Date:  tbd  [facility] Team Members Present: The following team members are supervising care for this patient and were present during this Weekly Team Conference      Physician: Dr Clarinda Dubin, MD  : PALLAVI Rae  Registered Nurse: Clint Noriega RN, BSN, CRRN  Physical Therapist: SRAVANI HernandezT  Occupational Therapist: James Bruno MS, OTR/L  Speech Therapist:

## 2023-01-11 NOTE — PROGRESS NOTES
01/11/23 1400   Pain Assessment   Pain Assessment Tool 0-10   Assessment   Treatment Assessment Pt out to CT scan this afternoon, 3 attempts made to see pt but he was not back yet  Therapy Time missed   Time missed?  Yes   Amount of time missed 90   Reason for time missed Medical procedure   Time(s) multiple attempts made 3

## 2023-01-11 NOTE — PROGRESS NOTES
Internal Medicine Progress Note  Patient: Luis Wayne  Age/sex: 61 y o  male  Medical Record #: [id number]      ASSESSMENT/PLAN: (Interval History)  Luis Wayne is seen and examined and management for following issues:    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  • s/p hepatic resection and reversal of colostomy on 11/7  • s/p right hemicolectomy with partial descending colectomy colocolonic anastomosis with loop ileostomy and mid line abdominal VAC placement 11/16  • WC following  • CT  Abd/pelvis 12/17 = loculated collection along splenic recess   Has perisplenic hilum collection as well --> to IR 12/20 for drain placement in both perigastric and perisplenic area and cx sent from both areas = Ecoli  • Flush drains with 10ml qd; drainage 35/0/20 on 1/10  • ileostomy 1/10 = 400ml  • S-O following/Dr Melida Gomez was in to see pt 1/2/23  • Tube check (incl perc alvina tube) done 1/3 = unchanged with still some fluid around each drain so kept in   Next tube check 2 weeks @1/17  • Bloody drainage in VICTORINO #3 and IR in to see pt and felt 2/2 tube manipulation when he went for his tube check = resolved  • On 1/10/23, WBC jumped, febrile to 102 4 --> for CT A/P today  BCs sent last evening and Ertapenem was restarted     Acute cholecystitis  • s/p percutaneous tube placement 12/8 with tube check on 12/12, 12/14  • GI saw due to alk phos elevation = felt 2/2 infiltrative disease rather than focal biliary obstruction   AMA was negative     • Alkaline phosphatase isoenzyme sent 12/17 (48% liver/52% bone) = GI  recommended continuing to monitor his LFTs and defer to surgical oncology for further w/u and plan      • TB normalized  • Alvina tube = 0 ml 1/10/23       • Per GI, do cholangiogram with next tube check --> wasn't done 1/3 and GI aware  • GI wrote note 1/4 = aware labs incl GGT of 1992 (previously 9102 on 12/17)     Bacteremia/abdominal abscess  • ID following   • s/p Ertapenem was switched to Doxycycline per ID as of 12/30 but had to be switched back 2/2 nausea  • Ertapenem completed    • Had chills 1/9 afternoon and AM 1/10 with WBC up to 16 = D/W Dr Jackeline Mccallum --> get CT abd/pelvis with PO/IV contrast 1/11/23   • Started IVF 1/10/23 since creat was 1 4 and to get dye  • WBC up to 17 today     Hypertension  • On Norvasc 5mg BID  • stable     Diabetes mellitus  • Home:  Glargine 35U qam/Glyburide-Metformin 5-500 qam/Januvia 100mg qam  • Here:  Lantus 10U qhs/Lispro 4U TID  • Lispro to be held if pt does not eat  • Has a DEXCOM meter at home  • Continue DM diet and QID Accuchecks/SSI     Acute on chronic renal failure  • Stage III; baseline 1 2-1 4  • Lisinopril currently on hold  • Creat down now from 1 4 to 1 1 with getting IVF     Anemia  • Multifactorial due to recent infection, surgery, CKD stage III  • Transfused on 12/15, 12/16/22 and 12/30  • Hemoglobin stable at 7 9 today 1/11/23      Atypical chest pain  • With elevation in troponin/EKG changes on acute side  • Cardiology saw then and cleared pt for discharge  • Did not recommend any further work up  • No further chest pain     Situational depression  • Neuropsychology consulted  • Patient not interested in medications at this point in time     Malnutrition  • Glucerna makes him nauseated  • On 12/15, ordered double protein     Pleural effusion  • CXR on 12/14 showed small left pleural effusion and was suspect for CHF  • ECHO 11/9/22 = LVEF 55%, unable to assess diastolic function, mild TR     L sided pleuritic pain  • located near drain site and per patient only occurs at night        Discharge date:  TBA       The above assessment and plan was reviewed and updated as determined by my evaluation of the patient on 1/11/2023      Labs:   Results from last 7 days   Lab Units 01/11/23  0555 01/10/23  0959   WBC Thousand/uL 17 07* 16 66*   HEMOGLOBIN g/dL 7 9* 7 9*   HEMATOCRIT % 25 4* 25 2*   PLATELETS Thousands/uL 388 383     Results from last 7 days   Lab Units 01/11/23  0555 01/10/23  0959   SODIUM mmol/L 138 134*   POTASSIUM mmol/L 4 4 4 5   CHLORIDE mmol/L 110* 108   CO2 mmol/L 20* 20*   BUN mg/dL 20 25   CREATININE mg/dL 1 16 1 44*   CALCIUM mg/dL 8 7 8 5             Results from last 7 days   Lab Units 01/11/23  0646 01/10/23  2139 01/10/23  1611   POC GLUCOSE mg/dl 159* 164* 101       Review of Scheduled Meds:  Current Facility-Administered Medications   Medication Dose Route Frequency Provider Last Rate   • acetaminophen  325 mg Oral Q4H PRN Cheyanne Seo MD     • acetaminophen  975 mg Oral Q8H [facility] GAURANG Keith     • amLODIPine  5 mg Oral BID GAURANG Keith     • aspirin  81 mg Oral Daily GAURANG Keith     • atorvastatin  40 mg Oral Daily GAURANG Keith     • calcium carbonate  500 mg Oral BID PRN GAURANG Rodgers     • cholecalciferol  400 Units Oral Daily GAURANG Rodgers     • collagenase   Topical Daily Cheyanne Seo MD     • ertapenem  1,000 mg Intravenous Q24H Damaris Cornejo MD 1,000 mg (01/10/23 2248)   • heparin (porcine)  5,000 Units Subcutaneous Q8H [facility] GAURANG Keith     • insulin glargine  10 Units Subcutaneous HS GAURANG Marcos     • insulin lispro  1-5 Units Subcutaneous HS GAURANG Keith     • insulin lispro  1-6 Units Subcutaneous TID AC GAURANG Keith     • insulin lispro  4 Units Subcutaneous TID With Meals GAURANG Acevedo     • iohexol  35 mL Oral 90 min pre-procedure GAURANG Marcos     • iohexol  50 mL Intravenous Once in imaging Jamaal Trent MD     • melatonin  3 mg Oral HS GAURANG Keith     • methocarbamol  500 mg Oral BID GAURANG Rodgers     • multivitamin-minerals  1 tablet Oral Daily GAURANG Keith     • ondansetron  4 mg Oral Q6H PRN GAURANG Keith     • ondansetron  4 mg Oral BID GAURANG Acevedo     • oxyCODONE  10 mg Oral Q6H PRN GAURANG Rodgers     • oxyCODONE  2 5 mg Oral Q3H PRN GAURANG Rodgers • oxyCODONE  5 mg Oral Q6H PRN GAURANG Keith     • pantoprazole  40 mg Oral BID AC GAURANG Keith     • simethicone  80 mg Oral 4x Daily (with meals and at bedtime) GAURANG Perdomo     • sodium chloride  75 mL/hr Intravenous Continuous GAURANG Pearson 75 mL/hr (01/11/23 0247)   • tamsulosin  0 4 mg Oral Daily With Dinner GAURANG Perdomo         Subjective/ HPI: Patient seen and examined  Patients overnight issues or events were reviewed with nursing or staff during rounds or morning huddle session  New or overnight issues include the following:     Spiked a fever to 102 4 last evening = BCs sent and Ertapenem restarted  For CT A/P today    ROS:   A 10 point ROS was performed; negative except as noted above  *Labs /Radiology studies reviewed  *Medications reviewed and reconciled as needed  *Please refer to order section for additional ordered labs studies  *Case discussed with primary attending during morning huddle case rounds    Physical Examination:  Vitals:   Vitals:    01/11/23 0300 01/11/23 0557 01/11/23 0817 01/11/23 0900   BP: 126/74 136/80 134/82 134/78   BP Location: Left arm Left arm  Left arm   Pulse: 74 99  88   Resp: 17 20  19   Temp: 97 5 °F (36 4 °C) 98 3 °F (36 8 °C)  98 5 °F (36 9 °C)   TempSrc: Oral Oral  Oral   SpO2: 100% 96%  98%   Weight:       Height:           General Appearance: no distress, conversive  HEENT:  External ear normal   Nose normal w/o drainage  Mucous membranes are moist  Oropharynx is clear  Conjunctiva clear w/o icterus or redness  Neck:  Supple, normal ROM  Lungs: BBS without crackles/wheeze/rhonchi; respirations unlabored with normal inspiratory/expiratory effort  No retractions noted  On RA  CV: regular rate and rhythm; no rubs/murmurs/gallops, PMI normal   ABD: Abdomen is soft  Bowel sounds all quadrants  Nontender with no distention      EXT: no edema  Skin: normal turgor, normal texture, no rashes  Psych: affect normal, mood normal  Neuro: AAO      The above physical exam was reviewed and updated as determined by my evaluation of the patient on 1/11/2023  Invasive Devices     Central Venous Catheter Line  Duration           Port A Cath 03/10/22 Right Chest 306 days          Drain  Duration           Ileostomy LUQ 54 days    Abscess Drain Abdomen 33 days    Cholecystostomy Tube 29 days    Abscess Drain Abdomen 21 days    Abscess Drain Back 21 days                   VTE Pharmacologic Prophylaxis: Heparin  Code Status: Level 1 - Full Code  Current Length of Stay: 28 day(s)      Total time spent:  30 minutes with more than 50% spent counseling/coordinating care  Counseling includes discussion with patient re: progress  and discussion with patient of his/her current medical state/information  Coordination of patient's care was performed in conjunction with primary service  Time invested included review of patient's labs, vitals, and management of their comorbidities with continued monitoring  In addition, this patient was discussed with medical team including physician and advanced extenders  The care of the patient was extensively discussed and appropriate treatment plan was formulated unique for this patient  Medical decision making for the day was made by supervising physician unless otherwise noted in their attestation statement  ** Please Note:  voice to text software may have been used in the creation of this document   Although proof errors in transcription or interpretation are a potential of such software**

## 2023-01-11 NOTE — PROGRESS NOTES
PM&R PROGRESS NOTE:  Vic Mart 61 y o  male MRN: [id number]  Unit/Bed#: -49 Encounter: [id number]        Rehabilitation Diagnosis: Impairment of mobility, safety and Activities of Daily Living (ADLs) due to Neurologic Conditions:  03 8  Neuromuscular Disorders    HPI: Williams Hampton is a 61 y o  male with a medical history of HTN, HLD, GERD, and ventral hernia that presented to [facility] on 11/7 for an elective surgical procedure d/t metastatic colon adenocarcinoma by Dr Geovanna Rosen  Patient presented to hem/onc 2/22 after CT abdomen showed transverse colonic apple core lesion and innumerable hepatic metastases  MRI revealed multiple hepatic metastasis with smaller lesions in left lobe and larger lesions in right lobe  Patient completed chemotherapy, colostomy at that time  On 11/7 patient underwent exploratory laparotomy, segment 3 liver resection, ablation, intraoperative ultrasound, and colostomy reversal  This was c/b left upper quadrant hematoma, imaging showed suspected leak from transverse colon anastomosis  On 11/16, patient underwent exp lap which revealed LUQ infected hematoma, defect in transverse colon anastomosis with extravasation of succus  Massively dilated cecum requiring resection  He had a right hemicolectomy, left in discontinuity and temporary abdominal closure  On 11/17, re-exploration, partial descending colectomy, primary colonic anastomosis created with diverting loop ileostomy and midline wound VAC placement  He completed course of IV abx for ESBL bacteremia from abdominal abscess  Midline abdominal incision wet to dry dressings and packing to old stoma site  12/6 abdominal incision noted to have yellow drainage  CT abdomen showed abdominal abscess and distended gallbladder  Patient underwent percutaneous cholecystostomy tube insertion and hepatectomy abscess drainage  ID consulted, IV abx for 1 week  Nephrology consulted d/t ALEXY, creatinine baseline 1 2-1 4   On 12/14 patient experienced chest pain, appeared musculoskeletal with no further work-up  Patient deemed medically stable and admitted to [facility] on 12/14/2022  SUBJECTIVE: Patient seen face to face  Patient reports interrupted sleep and chills overnight  Pain is well controlled, reports tolerable discomfort in abdomen  Patient febrile overnight, covering attending d/w ID, blood cultures and Ertapenem restarted  Continue IV fluids  CT a/p today  VICTORINO drains with minimal output and per chol drainage green  No CP, SOB, N/V, abdominal pain  ASSESSMENT: Stable, progressing    PLAN:  1  Leukocytosis- 17 07 (previous 16 66)  Ertapenem restarted  CT A/P- Interval worsening left hepatic postsurgical site fluid collection now measuring 4 2 x 3 3 cm, correlate with output  Moderate to large left effusion with adjacent atelectasis  Tumor involvement involving the right hepatic lobe appears slightly worsened  Surgical Oncology recommends IR evaluation of drains and thoracentesis  2  Creatinine 1 16 (previous 1 44), continue NSS 75 mL/hr   3  Pain- continue current regimen  4  Nausea post CT, patient encouraged to eat small amounts, Zofran scheduled and prn    Rehabilitation  Team conference: The team discussed patient's functional status, goals, and barriers  • Functional deficits:  Decreased foot clearance, shuffle gait  • Continue current rehabilitation plan of care to maximize function      • Functional update:   Physical Therapy Occupational Therapy Speech Therapy   Weight Bearing Status: Full Weight Bearing  Transfers: Supervision  Bed Mobility: Supervision  Amulation Distance (ft): 150 feet  Ambulation: Supervision, Incidental Touching  Assistive Device for Ambulation: Roller Walker  Wheelchair Mobility Distance: 50 ft  Wheelchair Mobility: Supervision  Number of Stairs: 10  Assistive Device for Stairs: Bilateral [employer] (RW on curb step)  Stair Assistance: Incidental Touching  Discharge Recommendations: Home with:  DC Home with[de-identified] Family Support, First Floor Setup, Home Physical Therapy   Eating: Independent  Grooming: Independent  Bathing: Supervision  Bathing: Supervision  Upper Body Dressing: Supervision  Lower Body Dressing: Supervision  Toileting: Supervision  Tub/Shower Transfer:  (N/A at this time, multiple drains/lines)  Toilet Transfer: Supervision  Cognition: Within Defined Limits  Orientation: Person, Place, Time, Situation               · Estimated Discharge: TBD with home PT, OT, RN    Pain  • Tylenol, oxycodone    DVT prophylaxis  • Heparin sc    Bladder plan  • Continent    Bowel plan  • Ileostomy     * Malignant neoplasm of transverse colon (HCC)  Assessment & Plan  · Transverse colonic apple core lesion and innumerable hepatic metastases  · S/p Colostomy placement 2/22  · RCW port-a-cath, accessed  · S/p palliative chemo  · Follows with Dr Ryanne Davis (palliative)  · Follows hem/onc (Dr Ben Scott)    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 liver [facility])  Assessment & Plan  · MRI-multiple hepatic metastasis; smaller lesions in left lobe, larger lesions in right lobe  · 11/7 Exp Lap, resection of hepatic segment 3, resection of transverse colon with reversal of loop colostomy (Dr Marybeth Couch)  · 11/16- right hemicolectomy, left discontinuity and temporary abdominal closure device placed  · 11/17- re-exploration, partial descending colectomy, primary colocolonic anastomosis with diverting loop ileostomy, midline VAC placement  · CT showed abdominal abscess and distended gallbladder  · 12/8- IR percutaneous cholecystostomy tube, hepatectomy abscess drain placement  ·  IR on 12/20 additional drains placed for a perisplenic abscess as well as a splenic recess abscess  · Total of 3 drains in place  Elevated alkaline phosphatase level  Assessment & Plan  1125, (previous 1025, 1315)  Hepatic mets  Follow level  ?bone involvement - no evidence of ROM limitations or pain complaints on survey    Follow up with Surg Onc    Dehiscence of incision  Assessment & Plan  Improving  Wound Care following and surgical oncology following   [facility] present  Surgical oncology- no signs of infection, recommend Santyl, ABD, paper tape     [facility] as follows:   Skin Care Plan:  1-Midline Incision and Right Abdomen Wounds: Cleanse wounds with NS and pat dry  Apply Santyl to slough tissue on wound bed nickel thick  Cover with ABD and secure with minimal tape  Change daily or PRN soilage or displacement  2-Turn/reposition q2h or when medically stable for pressure re-distribution on skin   3-Elevate heels to offload pressure  4-Moisturize skin daily with skin nourishing cream  5-Ehob cushion in chair when out of bed  6-Preventative Hydraguard to bilateral heels and bilateral sacro-buttocks BID and PRN          Leukocytosis  Assessment & Plan  · WBC 17 07 (previous 16 66, 13 34, 12 41)  · 01/11-repeat CT abd/pelvis w/ contrast  · Febrile- blood cultures sent, Ertapenem restarted  · Monitor temps      Acute pain  Assessment & Plan  · Managed on Scheduled Tylenol 975 mg [id number]  · Managed on Robaxin 500 mg BId  · Oxycodone IR PRN  · Managed well    Abscess  Assessment & Plan  · Abdominal abscess- ESBL E Coli - Contact precautions  · Zosyn completed  · Completed Ertapenem course 12/28/22  · Starting Doxycycline 100 mg Q12h 12/30/22 per ID consultants    · Doxycycline stopped, Ertapenem restarted complete 01/07    · 3 drains placed by IR  · Monitor 3 drain output:  · Abscess drain abdomen A: 0 cc   · Abscess drain back: 0-10 cc  · Abscess drain abdomen B: 15 - 20cc    1/3- tube check w/ IR  Tube position check  Patient having some discomfort in left abdomen while supine - per IR tubes in place, bulbs changed, recheck tube placement in 2 weeks  1/4- drain #3 developed sanguineous drainage with flushing, IR evaluated - flushing appropriately  Continue daily flushes on all the drains and monitor output   Reach out to IR if drainage is <10 cc per day for 2 days  Sepsis ([address] )  Assessment & Plan  · SIRS versus sepsis at this time given his vitals, leukocytosis, and complicated infection history  · Mgmt per ID, IM  · 12/20-IR perisplenic, perigastric drain placement for 2 additional abscesses-  · Perigastric abscess +E  Coli ESBL  · Blood cultures negative     Acute on chronic kidney failure [facility])  Assessment & Plan  · Creatinine baseline 1 2-1 4  · Cr = 1 16  · Monitor BMP    Bacteremia  Assessment & Plan  · Abx completed 01/07  · Monitor CBC    Iron deficiency anemia, unspecified  Assessment & Plan  · Hbg = 7 9 (previous 8 0)  · 12/15-1 unit PRBc  · 12/16- symptomatic- 1 unit PRBc  · 12/28- 1 unit PRBc  · Monitor CBC    Obesity (BMI 30-39  9)  Assessment & Plan  · BMI 33 0  · Current BMI 26 98  · Nutrition consult      Benign essential hypertension  Assessment & Plan  · Home: Norvasc 5 mg BID  · Here: Norvasc 5 mg BID  · Adjust medication as needed    Type 2 diabetes mellitus without complication, with long-term current use of insulin (HCC)  Assessment & Plan  · HbgA1c 8 0 (9/22)  · Home monitoring with N[place]h  · Home: Lantus 12 units, Glyburide, Januvia, Metformin  · Here: Lantus 10 units,  Humalog 4 units with meals, SSI   · Follow with PCP (Dr Sandy Reynolds)      [facility] consultants medical co-management  Labs, medications, and imaging reviewed  ROS:  Review of Systems   A 10 point review of systems was negative except for what is noted in the HPI  OBJECTIVE:   /80 (BP Location: Left arm)   Pulse 99   Temp 98 3 °F (36 8 °C) (Oral)   Resp 20   Ht 5' 10" (1 778 m)   Wt 85 3 kg (188 lb)   SpO2 96%   BMI 26 98 kg/m²     Physical Exam  Constitutional:       Appearance: Normal appearance  HENT:      Head: Normocephalic and atraumatic  Nose: Nose normal       Mouth/Throat:      Mouth: Mucous membranes are moist    Eyes:      Extraocular Movements: Extraocular movements intact        Pupils: Pupils are equal, round, and reactive to light  Cardiovascular:      Rate and Rhythm: Normal rate and regular rhythm  Pulses: Normal pulses  Heart sounds: Normal heart sounds  Pulmonary:      Effort: Pulmonary effort is normal       Breath sounds: Normal breath sounds  Abdominal:      Palpations: Abdomen is soft  Tenderness: There is abdominal tenderness  Musculoskeletal:         General: Normal range of motion  Cervical back: Normal range of motion  Skin:     General: Skin is warm and dry  Capillary Refill: Capillary refill takes less than 2 seconds  Neurological:      Mental Status: He is alert and oriented to person, place, and time     Psychiatric:         Mood and Affect: Mood normal          Judgment: Judgment normal         Lab Results   Component Value Date    WBC 17 07 (H) 01/11/2023    HGB 7 9 (L) 01/11/2023    HCT 25 4 (L) 01/11/2023    MCV 84 01/11/2023     01/11/2023     Lab Results   Component Value Date    SODIUM 138 01/11/2023    K 4 4 01/11/2023     (H) 01/11/2023    CO2 20 (L) 01/11/2023    BUN 20 01/11/2023    CREATININE 1 16 01/11/2023    GLUC 153 (H) 01/11/2023    CALCIUM 8 7 01/11/2023     Lab Results   Component Value Date    INR 1 12 12/19/2022    INR 1 10 11/12/2022    INR 1 24 (H) 11/09/2022    PROTIME 14 7 (H) 12/19/2022    PROTIME 14 4 11/12/2022    PROTIME 15 8 (H) 11/09/2022       Current Facility-Administered Medications:   •  acetaminophen (TYLENOL) tablet 325 mg, 325 mg, Oral, Q4H PRN, Morgan Alonzo MD  •  acetaminophen (TYLENOL) tablet 975 mg, 975 mg, Oral, Q8H [address], GAURANG Keith, 975 mg at 01/11/23 0557  •  amLODIPine (NORVASC) tablet 5 mg, 5 mg, Oral, BID, GAURANG Keith, 5 mg at 01/10/23 1743  •  aspirin chewable tablet 81 mg, 81 mg, Oral, Daily, GAURANG Keith, 81 mg at 01/10/23 0809  •  atorvastatin (LIPITOR) tablet 40 mg, 40 mg, Oral, Daily, GAURANG Keith, 40 mg at 01/10/23 0809  •  calcium carbonate (TUMS) chewable tablet 500 mg, 500 mg, Oral, BID PRN, GAURANG Keith, 500 mg at 01/06/23 1402  •  cholecalciferol (VITAMIN D3) tablet 400 Units, 400 Units, Oral, Daily, GAURANG Keith, 400 Units at 01/10/23 0810  •  collagenase (SANTYL) ointment, , Topical, Daily, Aleks Purdy MD, Given at 01/10/23 1396  •  ertapenem (INVanz) 1,000 mg in sodium chloride 0 9 % 50 mL IVPB, 1,000 mg, Intravenous, Q24H, Grant Romero MD, Last Rate: 100 mL/hr at 01/10/23 2248, 1,000 mg at 01/10/23 2248  •  heparin (porcine) subcutaneous injection 5,000 Units, 5,000 Units, Subcutaneous, Q8H [address], GAURANG Keith, 5,000 Units at 01/11/23 0557  •  insulin glargine (LANTUS) subcutaneous injection 10 Units 0 1 mL, 10 Units, Subcutaneous, HS, GAURANG Hinton, 10 Units at 01/10/23 2141  •  insulin lispro (HumaLOG) 100 units/mL subcutaneous injection 1-5 Units, 1-5 Units, Subcutaneous, HS, GAURANG Keith, 1 Units at 01/10/23 2155  •  insulin lispro (HumaLOG) 100 units/mL subcutaneous injection 1-6 Units, 1-6 Units, Subcutaneous, TID AC, 5 Units at 01/10/23 1137 **AND** Fingerstick Glucose (POCT), , , TID AC, GAURANG Keith  •  insulin lispro (HumaLOG) 100 units/mL subcutaneous injection 4 Units, 4 Units, Subcutaneous, TID With Meals, Michaelyn Spatz, CRNP, 4 Units at 01/10/23 1228  •  iohexol (OMNIPAQUE) 240 MG/ML solution 35 mL, 35 mL, Oral, 90 min pre-procedure, GAURANG Hinton  •  iohexol (OMNIPAQUE) 350 MG/ML injection (SINGLE-DOSE) 50 mL, 50 mL, Intravenous, Once in imaging, Jessica Mata MD  •  melatonin tablet 3 mg, 3 mg, Oral, HS, GAURANG Keith, 3 mg at 01/10/23 2134  •  methocarbamol (ROBAXIN) tablet 500 mg, 500 mg, Oral, BID, GAURANG Keith, 500 mg at 01/10/23 1743  •  multivitamin-minerals (CENTRUM) tablet 1 tablet, 1 tablet, Oral, Daily, GAURANG Keith, 1 tablet at 01/10/23 0808  •  ondansetron (ZOFRAN-ODT) dispersible tablet 4 mg, 4 mg, Oral, Q6H PRN, GAURANG Keith, 4 mg at 01/06/23 1402  •  ondansetron (ZOFRAN-ODT) dispersible tablet 4 mg, 4 mg, Oral, BID, GAURANG Arias, 4 mg at 01/10/23 2026  •  oxyCODONE (ROXICODONE) immediate release tablet 10 mg, 10 mg, Oral, Q6H PRN, GAURANG Keith, 10 mg at 01/10/23 2134  •  oxyCODONE (ROXICODONE) IR tablet 2 5 mg, 2 5 mg, Oral, Q3H PRN, GAURANG Keith, 2 5 mg at 12/25/22 1911  •  oxyCODONE (ROXICODONE) IR tablet 5 mg, 5 mg, Oral, Q6H PRN, GAURANG Keith, 5 mg at 01/08/23 1331  •  pantoprazole (PROTONIX) EC tablet 40 mg, 40 mg, Oral, BID AC, GAURANG Keith, 40 mg at 01/11/23 0556  •  simethicone (MYLICON) chewable tablet 80 mg, 80 mg, Oral, 4x Daily (with meals and at bedtime), GAURANG Keith, 80 mg at 01/10/23 2134  •  sodium chloride 0 9 % infusion, 75 mL/hr, Intravenous, Continuous, GAURANG Arias, Last Rate: 75 mL/hr at 01/11/23 0247, 75 mL/hr at 01/11/23 0247  •  tamsulosin (FLOMAX) capsule 0 4 mg, 0 4 mg, Oral, Daily With Dinner, GAURANG Medina, 0 4 mg at 01/10/23 1743    Past Medical History:   Diagnosis Date   • Abdominal pain 03/12/2022   • Acute renal failure ([facility] )     [id number] resolved   • Cancer ([facility] )    • Diabetes mellitus ([facility] )    • Enteritis 08/23/2016   • Gastroparesis due to DM ([facility] ) 08/23/2016   • GERD (gastroesophageal reflux disease)    • Hernia, ventral 08/04/2016   • Hyperlipidemia    • Hypertension    • Morbid obesity ([facility] ) 04/17/2018   • Postoperative visit 03/02/2022   • SIRS (systemic inflammatory response syndrome) ([facility] ) 03/12/2022   • Snoring    • Stage 3a chronic kidney disease ([facility] ) 02/19/2022       Patient Active Problem List    Diagnosis Date Noted   • Malignant neoplasm of transverse colon ([facility] [place] ) 03/01/2022   • 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 liver ([place] [address] ) 03/02/2022   • Dehiscence of incision 12/30/2022   • Elevated alkaline phosphatase level 12/30/2022   • Leukocytosis 12/29/2022   • Abscess 12/27/2022   • Acute pain 12/27/2022   • Sepsis ([place] [address] ) 12/17/2022   • Severe protein-calorie malnutrition ([facility] ) 12/14/2022   • Acute on chronic kidney failure ([facility] ) 12/14/2022   • Flash pulmonary edema ([facility]  ) 11/12/2022   • MR (mitral regurgitation) 11/12/2022   • Bacteremia 11/12/2022   • ESBL (extended spectrum beta-lactamase) producing bacteria infection 11/12/2022   • Acute respiratory failure with hypoxia ([place] [address] ) 11/12/2022   • Encephalopathy 11/09/2022   • Cervical radiculopathy 10/26/2022   • Other fatigue 06/15/2022   • Colostomy prolapse ([place] [address] ) 05/27/2022   • Colon cancer metastasized to liver ([place] [address] ) 03/12/2022   • Iron deficiency anemia, unspecified 03/01/2022   • Thrombocytosis 02/21/2022   • Transaminitis 02/19/2022   • Type 2 diabetes mellitus with hyperlipidemia ([place] [address] ) 02/18/2022   • Microcytic anemia 02/18/2022   • Left ureteral calculus 01/30/2020   • Incarcerated umbilical hernia 43/98/0057   • Testicular hypogonadism 06/19/2017   • Low testosterone 05/30/2017   • Type 2 diabetes mellitus without complication, with long-term current use of insulin ([place] [address] ) 08/23/2016   • Benign essential hypertension 08/23/2016   • Mixed hyperlipidemia 08/23/2016   • Erectile dysfunction 07/11/2016   • Obesity (BMI 30-39 9) 07/11/2016      GAURANG Bassett  Physical Medicine and Chris Luu    Total time spent:  35 minutes, with more than 50% spent counseling/coordinating care  Counseling includes discussion with patient re: progress in therapies, functional issues observed by therapy staff, and discussion with patient his/her current medical state/wellbeing  Coordination of patient's care was performed in conjunction with Internal Medicine service to monitor patient's labs, vitals, and management of their comorbidities  In addition, this patient was discussed by the interdisciplinary team in weekly case conference today   The care of the patient was extensively discussed with all care providers and an appropriate rehabilitation plan was formulated unique for this patient  Barriers were identified preventing progression of therapy and appropriate interventions were discussed with each discipline  Please see the team note for input from all disciplines regarding barriers, intervention, and discharge planning

## 2023-01-11 NOTE — PLAN OF CARE
Problem: Prexisting or High Potential for Compromised Skin Integrity  Goal: Skin integrity is maintained or improved  Description: INTERVENTIONS:  - Identify patients at risk for skin breakdown  - Assess and monitor skin integrity  - Assess and monitor nutrition and hydration status  - Monitor labs   - Assess for incontinence   - Turn and reposition patient  - Assist with mobility/ambulation  - Relieve pressure over bony prominences  - Avoid friction and shearing  - Provide appropriate hygiene as needed including keeping skin clean and dry  - Evaluate need for skin moisturizer/barrier cream  - Collaborate with interdisciplinary team   - Patient/family teaching  - Consider wound care consult   Outcome: Progressing     Problem: Potential for Falls  Goal: Patient will remain free of falls  Description: INTERVENTIONS:  - Educate patient/family on patient safety including physical limitations  - Instruct patient to call for assistance with activity   - Consult OT/PT to assist with strengthening/mobility   - Keep Call bell within reach  - Keep bed low and locked with side rails adjusted as appropriate  - Keep care items and personal belongings within reach  - Initiate and maintain comfort rounds  - Make Fall Risk Sign visible to staff  - Offer Toileting every 2-4 Hours, in advance of need  - Initiate/Maintain bed/chair alarm  - Obtain necessary fall risk management equipment: nonskid footwear   - Apply yellow socks and bracelet for high fall risk patients  - Consider moving patient to room near nurses station  Outcome: Progressing     Problem: PAIN - ADULT  Goal: Verbalizes/displays adequate comfort level or baseline comfort level  Description: Interventions:  - Encourage patient to monitor pain and request assistance  - Assess pain using appropriate pain scale  - Administer analgesics based on type and severity of pain and evaluate response  - Implement non-pharmacological measures as appropriate and evaluate response  - Consider cultural and social influences on pain and pain management  - Notify physician/advanced practitioner if interventions unsuccessful or patient reports new pain  Outcome: Progressing     Problem: INFECTION - ADULT  Goal: Absence or prevention of progression during hospitalization  Description: INTERVENTIONS:  - Assess and monitor for signs and symptoms of infection  - Monitor lab/diagnostic results  - Monitor all insertion sites, i e  indwelling lines, tubes, and drains  - Monitor endotracheal if appropriate and nasal secretions for changes in amount and color  - Sutton appropriate cooling/warming therapies per order  - Administer medications as ordered  - Instruct and encourage patient and family to use good hand hygiene technique  - Identify and instruct in appropriate isolation precautions for identified infection/condition  Outcome: Progressing     Problem: SAFETY ADULT  Goal: Patient will remain free of falls  Description: INTERVENTIONS:  - Educate patient/family on patient safety including physical limitations  - Instruct patient to call for assistance with activity   - Consult OT/PT to assist with strengthening/mobility   - Keep Call bell within reach  - Keep bed low and locked with side rails adjusted as appropriate  - Keep care items and personal belongings within reach  - Initiate and maintain comfort rounds  - Make Fall Risk Sign visible to staff  - Offer Toileting every 2-4 Hours, in advance of need  - Initiate/Maintain bed/chair alarm  - Obtain necessary fall risk management equipment: nonskid footwear  - Apply yellow socks and bracelet for high fall risk patients  - Consider moving patient to room near nurses station  Outcome: Progressing  Goal: Maintain or return to baseline ADL function  Description: INTERVENTIONS:  -  Assess patient's ability to carry out ADLs; assess patient's baseline for ADL function and identify physical deficits which impact ability to perform ADLs (bathing, care of mouth/teeth, toileting, grooming, dressing, etc )  - Assess/evaluate cause of self-care deficits   - Assess range of motion  - Assess patient's mobility; develop plan if impaired  - Assess patient's need for assistive devices and provide as appropriate  - Encourage maximum independence but intervene and supervise when necessary  - Involve family in performance of ADLs  - Assess for home care needs following discharge   - Consider OT consult to assist with ADL evaluation and planning for discharge  - Provide patient education as appropriate  Outcome: Progressing  Goal: Maintains/Returns to pre admission functional level  Description: INTERVENTIONS:  - Perform BMAT or MOVE assessment daily    - Set and communicate daily mobility goal to care team and patient/family/caregiver  - Collaborate with rehabilitation services on mobility goals if consulted  - Perform Range of Motion 3 times a day  - Reposition patient every 2 hours    - Dangle patient 3 times a day  - Stand patient 3 times a day  - Ambulate patient 3 times a day  - Out of bed to chair 3 times a day   - Out of bed for meals 3 times a day  - Out of bed for toileting  - Record patient progress and toleration of activity level   Outcome: Progressing     Problem: DISCHARGE PLANNING  Goal: Discharge to home or other facility with appropriate resources  Description: INTERVENTIONS:  - Identify barriers to discharge w/patient and caregiver  - Arrange for needed discharge resources and transportation as appropriate  - Identify discharge learning needs (meds, wound care, etc )  - Arrange for interpretive services to assist at discharge as needed  - Refer to Case Management Department for coordinating discharge planning if the patient needs post-hospital services based on physician/advanced practitioner order or complex needs related to functional status, cognitive ability, or social support system  Outcome: Progressing     Problem: Nutrition/Hydration-ADULT  Goal: Nutrient/Hydration intake appropriate for improving, restoring or maintaining nutritional needs  Description: Monitor and assess patient's nutrition/hydration status for malnutrition  Collaborate with interdisciplinary team and initiate plan and interventions as ordered  Monitor patient's weight and dietary intake as ordered or per policy  Utilize nutrition screening tool and intervene as necessary  Determine patient's food preferences and provide high-protein, high-caloric foods as appropriate       INTERVENTIONS:  - Monitor oral intake, urinary output, labs, and treatment plans  - Assess nutrition and hydration status and recommend course of action  - Evaluate amount of meals eaten  - Assist patient with eating if necessary   - Allow adequate time for meals  - Recommend/ encourage appropriate diets, oral nutritional supplements, and vitamin/mineral supplements  - Order, calculate, and assess calorie counts as needed  - Recommend, monitor, and adjust tube feedings and TPN/PPN based on assessed needs  - Assess need for intravenous fluids  - Provide specific nutrition/hydration education as appropriate  - Include patient/family/caregiver in decisions related to nutrition  Outcome: Progressing     Problem: MOBILITY - ADULT  Goal: Maintain or return to baseline ADL function  Description: INTERVENTIONS:  -  Assess patient's ability to carry out ADLs; assess patient's baseline for ADL function and identify physical deficits which impact ability to perform ADLs (bathing, care of mouth/teeth, toileting, grooming, dressing, etc )  - Assess/evaluate cause of self-care deficits   - Assess range of motion  - Assess patient's mobility; develop plan if impaired  - Assess patient's need for assistive devices and provide as appropriate  - Encourage maximum independence but intervene and supervise when necessary  - Involve family in performance of ADLs  - Assess for home care needs following discharge   - Consider OT consult to assist with ADL evaluation and planning for discharge  - Provide patient education as appropriate  Outcome: Progressing  Goal: Maintains/Returns to pre admission functional level  Description: INTERVENTIONS:  - Perform BMAT or MOVE assessment daily    - Set and communicate daily mobility goal to care team and patient/family/caregiver  - Collaborate with rehabilitation services on mobility goals if consulted  - Perform Range of Motion 3 times a day  - Reposition patient every 2 hours    - Dangle patient 3 times a day  - Stand patient 3 times a day  - Ambulate patient 3 times a day  - Out of bed to chair 3 times a day   - Out of bed for meals 3 times a day  - Out of bed for toileting  - Record patient progress and toleration of activity level   Outcome: Progressing

## 2023-01-11 NOTE — PROGRESS NOTES
Progress Note - Infectious Disease   Daisy Mcgee 61 y o  male MRN: [id number]  Unit/Bed#: -01 Encounter: [id number]      Impression/Recommendations:  Sepsis     Evolving 12/17:  WBC, HR   Suspect due to persistent left intra-abdominal infection in setting of complex history below, recently completed antibiotics   ROS and exam otherwise negative   Recent Flu/RSV/COVID PCR, CXR negative   Blood cultures negative   Improved with reinitiation of antibiotics, additional drain placement  Recurrent sweats, WBC in setting of D/C of antibiotics, decreased drain output  Consider ongoing +/- increased intraabdominal collections, need for drain adjustment  Rec:  • Continue ertapenem for now  • Check CT A/P   • Follow drain output as below  • Follow temperatures closely  • Recheck CBC in AM  • Supportive care as per the primary service  • If fever, WBC can be attributed to need for drain readjustment, can likely D/C antibiotics soon     Intra-abdominal abscess     In the setting complex surgical history as below   Initially developed 11/2022 accompanied by ESBL E  coli bacteremia   Status post exploratory laparotomy, right hemicolectomy, temporary abdominal closure 11/16; re-exploration, partial left colectomy, primary ileocolonic anastomosis with proximal diverting loop ileostomy, midline fascial closure on 11/17   More recently developed abscess in hepatectomy bed, also collection +/- leak at area of prior colonic anastamosis, 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   Status post IR drain into perihepatic collection 12/8   Cultures with ESBL E  Coli   Status post 7 days ertapenem after drain placement through 12/15   Developed recurrent sepsis and repeat CT 12/17 shows hepatic bed collection improved, persistent left intra-abdominal collection   Status post perigastic, perisplenic drains 12/20   Cultures again with ESBL E  Coli   Status post 21 days total antibiotics    Recurrent WBC as above in setting of decreased drain output  Rec:  • Continue to follow closely off antibiotics  • Check repeat CT A/P with contrast  • Likely needs drain check     Possible acute cholecystitis     Status post percutaneous cholecystostomy tube   Alk phos remains markedly elevated, possibly due to drain itself versus known intrahepatic metastases      Metastatic colon cancer   To liver, possible lung   Status post resection, chemotherapy, more recent hepatic resection and ablation, transverse colon resection      CKD   Baseline Cr 1 4       DM      Anemia   Status post multiple transfusions      The above plan was discussed in detail with Dr Juliann Thapa will assume care       Antibiotics:  Ertapenem #1    Subjective:  Patient seen on AM rounds  Doing OK  Minimal VICTORINO output    24 Hour Events:  High fever overnight  Ertapenem restarted  WBC up today  CT A/P pending later this AM     Objective:  Vitals:  Temp:  [97 5 °F (36 4 °C)-102 4 °F (39 1 °C)] 98 5 °F (36 9 °C)  HR:  [] 88  Resp:  [17-20] 19  BP: (119-158)/(73-82) 134/78  SpO2:  [95 %-100 %] 98 %  Temp (24hrs), Av 1 °F (37 3 °C), Min:97 5 °F (36 4 °C), Max:102 4 °F (39 1 °C)  Current: Temperature: 98 5 °F (36 9 °C)    Physical Exam:   General:  No acute distress  Psychiatric:  Awake and alert  Pulmonary:  Normal respiratory excursion without accessory muscle use  Abdomen:  Soft, nontender  Extremities:  No edema  Skin:  No rashes    Lab Results:  I have personally reviewed pertinent labs    Results from last 7 days   Lab Units 23  0555 01/10/23  0959 23  0533 23  0618   POTASSIUM mmol/L 4 4 4 5 4 0 3 7   CHLORIDE mmol/L 110* 108 109* 107   CO2 mmol/L 20* 20* 20* 20*   BUN mg/dL 20 25 24 20   CREATININE mg/dL 1 16 1 44* 1 32* 1 13   EGFR ml/min/1 73sq m 69 53 59 71   CALCIUM mg/dL 8 7 8 5 8 5 8 7   AST U/L  --   --  45 43   ALT U/L  --   --  13 12   ALK PHOS U/L  --   --  1,120* 1,025*     Results from last 7 days   Lab Units 01/11/23  0555 01/10/23  0959 01/09/23  0533   WBC Thousand/uL 17 07* 16 66* 13 34*   HEMOGLOBIN g/dL 7 9* 7 9* 8 0*   PLATELETS Thousands/uL 388 383 430*     Results from last 7 days   Lab Units 01/10/23  2125   BLOOD CULTURE  Received in Microbiology Lab  Culture in Progress  Received in Microbiology Lab  Culture in Progress  Imaging Studies:   I have personally reviewed pertinent imaging study reports and images in PACS  EKG, Pathology, and Other Studies:   I have personally reviewed pertinent reports

## 2023-01-11 NOTE — PROGRESS NOTES
Progress Note - Surgical Oncology   Dana Mata 61 y o  male MRN: [id number]  Unit/Bed#: -75 Encounter: [id number]  01/11/23  6:40 PM      Subjective/Objective   No chief complaint on file  Subjective: No Complaints except for nausea after CT    Objective: Febrile  CT obtained  Blood pressure 148/82, pulse (!) 109, temperature 99 1 °F (37 3 °C), temperature source Oral, resp  rate 18, height 5' 10" (1 778 m), weight 85 3 kg (188 lb), SpO2 98 %  ,Body mass index is 26 98 kg/m²  Intake/Output Summary (Last 24 hours) at 1/11/2023 1840  Last data filed at 1/11/2023 1106  Gross per 24 hour   Intake 1133 75 ml   Output 1255 ml   Net -121 25 ml       Invasive Devices     Central Venous Catheter Line  Duration           Port A Cath 03/10/22 Right Chest 307 days          Drain  Duration           Ileostomy LUQ 55 days    Abscess Drain Abdomen 33 days    Cholecystostomy Tube 30 days    Abscess Drain Abdomen 21 days    Abscess Drain Back 21 days                Physical Exam:   Head and neck: is normocephalic  Neck is supple without adenopathy  Sclerae are anicteric  Mucous membranes are moist   Abdomen: Soft, nontender, nondistended, and without masses    Drains are purulent  Extremities: Without cyanosis, clubbing, or edema, symmetric  Neuro: Grossly nonfocal  Skin is warm and anicteric  Psych: Patient is pleasant and talkative              Lab Results:  Lab Results   Component Value Date    WBC 17 07 (H) 01/11/2023    HGB 7 9 (L) 01/11/2023    HCT 25 4 (L) 01/11/2023    MCV 84 01/11/2023     01/11/2023     Lab Results   Component Value Date    GLUCOSE 151 (H) 11/16/2022    CALCIUM 8 7 01/11/2023     05/02/2017    K 4 4 01/11/2023    CO2 20 (L) 01/11/2023     (H) 01/11/2023    BUN 20 01/11/2023    CREATININE 1 16 01/11/2023     No results found for: AFP  Lab Results   Component Value Date    CALCIUM 8 7 01/11/2023    PHOS 3 9 12/11/2022     Lab Results   Component Value Date    CEA 69  2 (H) 08/05/2022       I personally reviewed his CT which shows slightly enlarging perihepatic collection near segment 3        Assessment:  69-year-old male status post segment 3 liver resection, colon resection and takedown of colostomy followed by right hemicolectomy and loop ileostomy    Plan:  Continue IR drainage  IR evaluation of the drains given the CT findings    Discussed with the primary service  Continue aggressive rehab  Continue with local wound care to the incision    Wendy Ramirez MD  6:40 PM

## 2023-01-11 NOTE — WOUND OSTOMY CARE
Progress Note - Wound   Bob Mcginnis 61 y o  male MRN: [id number]  Unit/Bed#: -25 Encounter: [id number]        Assessment:   Patient is seen for wound care follow-up  62 yo male that was admitted to [address] [place] [address] for rehab post malignant neoplasm of transverse colon with ileostomy formation  PMH: HTN, HLD, Ventral Hernia, GERD      Patient seen laying on p-500 specialty mattress  Patient reports tiredness and fatigue on assessment today  Refused sacro-buttocks assessment and reports no pain or wounds in area  Patient reports that pouch change was performed recently  Findings:  B/L heels are dry intact and ramon with no skin loss or wounds present  Recommend preventative Hydraguard Cream and proper offloading/ repositioning  B/L heels are dry intact and ramon with no skin loss or wounds present  Recommend preventative Hydraguard Cream and proper offloading/ repositioning  1  Midline Incision and Right abdomen Wound: right abdomen wound remains 100% eschar/scab that is well adhered and dry  Midline Incision is irregular in shape and has decreased in size  Wound bed remains full thickness skin loss with an 80% moist appearing yellow slough wound bed - slough appears to begin to be lifting off  20% of beefy red and pink new epitheliale tissue  May benefit from debridement to expedite process  Nisa-wound is fragile and pink in color  Scant serosanguinous drainage noted from area  Continue with santyl  Santyle applied and area covered with abd  No induration, fluctuance, odor, warmth/temperature differences, redness, or purulence noted to the above noted wounds and skin areas assessed  New dressings applied per orders listed below  Patient tolerated well- no s/s of non-verbal pain or discomfort observed during the encounter  Bedside nurse aware of plan of care  See flow sheets for more detailed assessment findings        Orders listed below and wound care will continue to follow, call or tiger text with questions  Skin Care Plan:  1-Midline Incision and Right Abdomen Wounds: Cleanse wounds with NS and pat dry  Apply Santyl to slough tissue on wound bed nickel thick  Cover with ABD and secure with minimal tape  Change daily or PRN soilage or displacement  2-Turn/reposition q2h or when medically stable for pressure re-distribution on skin   3-Elevate heels to offload pressure  4-Moisturize skin daily with skin nourishing cream  5-Ehob cushion in chair when out of bed  6-Preventative Hydraguard to bilateral heels and bilateral sacro-buttocks BID and PRN       Continue with ostomy care from previous notes  WOUNDS:  Wound 11/07/22 Abdomen N/A (Active)   Wound Image   01/11/23 1118   Wound Description Brown;Slough; Yellow;Epithelialization 01/11/23 1118   Nisa-wound Assessment McConnell 01/11/23 1118   Wound Length (cm) 15 cm 01/11/23 1118   Wound Width (cm) 2 7 cm 01/11/23 1118   Wound Depth (cm) 0 5 cm 01/11/23 1118   Wound Surface Area (cm^2) 40 5 cm^2 01/11/23 1118   Wound Volume (cm^3) 20 25 cm^3 01/11/23 1118   Calculated Wound Volume (cm^3) 20 25 cm^3 01/11/23 1118   Change in Wound Size % 95 86 01/11/23 1118   Tunneling 0 cm 01/11/23 1118   Tunneling in depth located at 0 01/11/23 1118   Undermining 0 01/11/23 1118   Undermining is depth extending from 0 01/11/23 1118   Wound Site Closure DONNA 01/11/23 1118   Drainage Amount Scant 01/11/23 1118   Drainage Description Serosanguineous 01/11/23 1118   Non-staged Wound Description Full thickness 01/11/23 1118   Treatments Cleansed;Site care 01/11/23 1118   Dressing Enzymatic debrider;ABD 01/11/23 1118   Wound packed? No 01/11/23 1118   Packing- # removed 0 01/11/23 1118   Packing- # inserted 0 01/11/23 1118   Dressing Changed Changed 01/11/23 1118   Patient Tolerance Tolerated well 01/11/23 1118   Dressing Status Clean;Dry; Intact 01/11/23 1118                Jorge Rose RN, BSN

## 2023-01-11 NOTE — QUICK NOTE
PMR On Call    Contacted by nursing, patient tachy to 112 with temp of 102 4  His WBC was elevated today and CTAP is pending to eval for ongoing or increased intra-abdominal collections  I discussed with ID and nursing - will order blood cultures now, and initiate ertapenem once blood cultures are drawn  CBC ordered for the AM  Continue IVF       Conor Vo MD  Physical Medicine and Rehabilitation

## 2023-01-12 ENCOUNTER — APPOINTMENT (INPATIENT)
Dept: RADIOLOGY | Facility: HOSPITAL | Age: 58
End: 2023-01-12

## 2023-01-12 ENCOUNTER — TELEPHONE (OUTPATIENT)
Dept: OTHER | Facility: OTHER | Age: 58
End: 2023-01-12

## 2023-01-12 LAB
ANION GAP SERPL CALCULATED.3IONS-SCNC: 9 MMOL/L (ref 4–13)
APPEARANCE FLD: NORMAL
BASOPHILS # BLD AUTO: 0.03 THOUSANDS/ÂΜL (ref 0–0.1)
BASOPHILS NFR BLD AUTO: 0 % (ref 0–1)
BUN SERPL-MCNC: 16 MG/DL (ref 5–25)
CALCIUM SERPL-MCNC: 8.5 MG/DL (ref 8.3–10.1)
CHLORIDE SERPL-SCNC: 107 MMOL/L (ref 96–108)
CO2 SERPL-SCNC: 20 MMOL/L (ref 21–32)
COLOR FLD: YELLOW
CREAT SERPL-MCNC: 1 MG/DL (ref 0.6–1.3)
EOSINOPHIL # BLD AUTO: 0 THOUSAND/ÂΜL (ref 0–0.61)
EOSINOPHIL NFR BLD AUTO: 0 % (ref 0–6)
ERYTHROCYTE [DISTWIDTH] IN BLOOD BY AUTOMATED COUNT: 17.7 % (ref 11.6–15.1)
GFR SERPL CREATININE-BSD FRML MDRD: 82 ML/MIN/1.73SQ M
GLUCOSE FLD-MCNC: 123 MG/DL
GLUCOSE P FAST SERPL-MCNC: 127 MG/DL (ref 65–99)
GLUCOSE SERPL-MCNC: 115 MG/DL (ref 65–140)
GLUCOSE SERPL-MCNC: 127 MG/DL (ref 65–140)
GLUCOSE SERPL-MCNC: 134 MG/DL (ref 65–140)
GLUCOSE SERPL-MCNC: 138 MG/DL (ref 65–140)
GLUCOSE SERPL-MCNC: 180 MG/DL (ref 65–140)
HCT VFR BLD AUTO: 23.5 % (ref 36.5–49.3)
HGB BLD-MCNC: 7.3 G/DL (ref 12–17)
IMM GRANULOCYTES # BLD AUTO: 0.18 THOUSAND/UL (ref 0–0.2)
IMM GRANULOCYTES NFR BLD AUTO: 1 % (ref 0–2)
LDH FLD L TO P-CCNC: 284 U/L
LYMPHOCYTES # BLD AUTO: 1.87 THOUSANDS/ÂΜL (ref 0.6–4.47)
LYMPHOCYTES NFR BLD AUTO: 15 % (ref 14–44)
MCH RBC QN AUTO: 26.4 PG (ref 26.8–34.3)
MCHC RBC AUTO-ENTMCNC: 31.1 G/DL (ref 31.4–37.4)
MCV RBC AUTO: 85 FL (ref 82–98)
MONOCYTES # BLD AUTO: 1.63 THOUSAND/ÂΜL (ref 0.17–1.22)
MONOCYTES NFR BLD AUTO: 13 % (ref 4–12)
NEUTROPHILS # BLD AUTO: 8.93 THOUSANDS/ÂΜL (ref 1.85–7.62)
NEUTS SEG NFR BLD AUTO: 71 % (ref 43–75)
NRBC BLD AUTO-RTO: 0 /100 WBCS
PH BODY FLUID: 7.5
PLATELET # BLD AUTO: 343 THOUSANDS/UL (ref 149–390)
PMV BLD AUTO: 10.7 FL (ref 8.9–12.7)
POTASSIUM SERPL-SCNC: 3.9 MMOL/L (ref 3.5–5.3)
PROT FLD-MCNC: 5.6 G/DL
RBC # BLD AUTO: 2.77 MILLION/UL (ref 3.88–5.62)
SITE: NORMAL
SODIUM SERPL-SCNC: 136 MMOL/L (ref 135–147)
WBC # BLD AUTO: 12.64 THOUSAND/UL (ref 4.31–10.16)
WBC # FLD MANUAL: 970 /UL

## 2023-01-12 PROCEDURE — BW111ZZ FLUOROSCOPY OF ABDOMEN AND PELVIS USING LOW OSMOLAR CONTRAST: ICD-10-PCS | Performed by: RADIOLOGY

## 2023-01-12 PROCEDURE — 0W9B3ZZ DRAINAGE OF LEFT PLEURAL CAVITY, PERCUTANEOUS APPROACH: ICD-10-PCS | Performed by: RADIOLOGY

## 2023-01-12 PROCEDURE — 0W2GX0Z CHANGE DRAINAGE DEVICE IN PERITONEAL CAVITY, EXTERNAL APPROACH: ICD-10-PCS | Performed by: RADIOLOGY

## 2023-01-12 RX ORDER — LIDOCAINE HYDROCHLORIDE 10 MG/ML
INJECTION, SOLUTION EPIDURAL; INFILTRATION; INTRACAUDAL; PERINEURAL AS NEEDED
Status: COMPLETED | OUTPATIENT
Start: 2023-01-12 | End: 2023-01-12

## 2023-01-12 RX ORDER — FENTANYL CITRATE 50 UG/ML
INJECTION, SOLUTION INTRAMUSCULAR; INTRAVENOUS AS NEEDED
Status: COMPLETED | OUTPATIENT
Start: 2023-01-12 | End: 2023-01-12

## 2023-01-12 RX ORDER — MIDAZOLAM HYDROCHLORIDE 2 MG/2ML
INJECTION, SOLUTION INTRAMUSCULAR; INTRAVENOUS AS NEEDED
Status: COMPLETED | OUTPATIENT
Start: 2023-01-12 | End: 2023-01-12

## 2023-01-12 RX ADMIN — METHOCARBAMOL 500 MG: 500 TABLET ORAL at 09:41

## 2023-01-12 RX ADMIN — AMLODIPINE BESYLATE 5 MG: 5 TABLET ORAL at 09:41

## 2023-01-12 RX ADMIN — ATORVASTATIN CALCIUM 40 MG: 40 TABLET, FILM COATED ORAL at 09:41

## 2023-01-12 RX ADMIN — HEPARIN SODIUM 5000 UNITS: 5000 INJECTION INTRAVENOUS; SUBCUTANEOUS at 20:48

## 2023-01-12 RX ADMIN — INSULIN LISPRO 1 UNITS: 100 INJECTION, SOLUTION INTRAVENOUS; SUBCUTANEOUS at 21:38

## 2023-01-12 RX ADMIN — INSULIN GLARGINE 10 UNITS: 100 INJECTION, SOLUTION SUBCUTANEOUS at 21:37

## 2023-01-12 RX ADMIN — IOHEXOL 20 ML: 300 INJECTION, SOLUTION INTRAVENOUS at 16:23

## 2023-01-12 RX ADMIN — MIDAZOLAM 1 MG: 1 INJECTION INTRAMUSCULAR; INTRAVENOUS at 16:14

## 2023-01-12 RX ADMIN — ACETAMINOPHEN 975 MG: 325 TABLET, FILM COATED ORAL at 13:15

## 2023-01-12 RX ADMIN — FENTANYL CITRATE 50 MCG: 50 INJECTION INTRAMUSCULAR; INTRAVENOUS at 15:55

## 2023-01-12 RX ADMIN — MELATONIN TAB 3 MG 3 MG: 3 TAB at 21:02

## 2023-01-12 RX ADMIN — COLLAGENASE SANTYL: 250 OINTMENT TOPICAL at 10:48

## 2023-01-12 RX ADMIN — ONDANSETRON 4 MG: 4 TABLET, ORALLY DISINTEGRATING ORAL at 20:28

## 2023-01-12 RX ADMIN — FENTANYL CITRATE 50 MCG: 50 INJECTION INTRAMUSCULAR; INTRAVENOUS at 14:51

## 2023-01-12 RX ADMIN — ACETAMINOPHEN 975 MG: 325 TABLET, FILM COATED ORAL at 21:41

## 2023-01-12 RX ADMIN — PANTOPRAZOLE SODIUM 40 MG: 40 TABLET, DELAYED RELEASE ORAL at 04:54

## 2023-01-12 RX ADMIN — ERTAPENEM SODIUM 1000 MG: 1 INJECTION, POWDER, LYOPHILIZED, FOR SOLUTION INTRAMUSCULAR; INTRAVENOUS at 20:28

## 2023-01-12 RX ADMIN — LIDOCAINE HYDROCHLORIDE 10 ML: 10 INJECTION, SOLUTION EPIDURAL; INFILTRATION; INTRACAUDAL; PERINEURAL at 14:56

## 2023-01-12 RX ADMIN — HEPARIN SODIUM 5000 UNITS: 5000 INJECTION INTRAVENOUS; SUBCUTANEOUS at 13:15

## 2023-01-12 RX ADMIN — MIDAZOLAM 1 MG: 1 INJECTION INTRAMUSCULAR; INTRAVENOUS at 16:07

## 2023-01-12 RX ADMIN — OXYCODONE HYDROCHLORIDE 10 MG: 10 TABLET ORAL at 23:27

## 2023-01-12 RX ADMIN — MELATONIN TAB 3 MG 3 MG: 3 TAB at 20:28

## 2023-01-12 RX ADMIN — ACETAMINOPHEN 975 MG: 325 TABLET, FILM COATED ORAL at 04:54

## 2023-01-12 NOTE — PROGRESS NOTES
PM&R PROGRESS NOTE:  Beth Cooley 61 y o  male MRN: [id number]  Unit/Bed#: -72 Encounter: [id number]        Rehabilitation Diagnosis: Impairment of mobility, safety and Activities of Daily Living (ADLs) due to Neurologic Conditions:  03 8  Neuromuscular Disorders    HPI: Jacinta Landry is a 61 y o  male with a medical history of HTN, HLD, GERD, and ventral hernia that presented to [facility] on 11/7 for an elective surgical procedure d/t metastatic colon adenocarcinoma by Dr Wilson Seat  Patient presented to hem/onc 2/22 after CT abdomen showed transverse colonic apple core lesion and innumerable hepatic metastases  MRI revealed multiple hepatic metastasis with smaller lesions in left lobe and larger lesions in right lobe  Patient completed chemotherapy, colostomy at that time  On 11/7 patient underwent exploratory laparotomy, segment 3 liver resection, ablation, intraoperative ultrasound, and colostomy reversal  This was c/b left upper quadrant hematoma, imaging showed suspected leak from transverse colon anastomosis  On 11/16, patient underwent exp lap which revealed LUQ infected hematoma, defect in transverse colon anastomosis with extravasation of succus  Massively dilated cecum requiring resection  He had a right hemicolectomy, left in discontinuity and temporary abdominal closure  On 11/17, re-exploration, partial descending colectomy, primary colonic anastomosis created with diverting loop ileostomy and midline wound VAC placement  He completed course of IV abx for ESBL bacteremia from abdominal abscess  Midline abdominal incision wet to dry dressings and packing to old stoma site  12/6 abdominal incision noted to have yellow drainage  CT abdomen showed abdominal abscess and distended gallbladder  Patient underwent percutaneous cholecystostomy tube insertion and hepatectomy abscess drainage  ID consulted, IV abx for 1 week  Nephrology consulted d/t ALEXY, creatinine baseline 1 2-1 4   On 12/14 patient experienced chest pain, appeared musculoskeletal with no further work-up  Patient deemed medically stable and admitted to [facility] on 12/14/2022  SUBJECTIVE: Patient seen face to face  Patient seen post IR procedure, sitting up eating snack  Reports feeling groggy  Denies nausea  Patient aware of procedures performed in IR  Denies pain  No CP, SOB, fever, chills, N/V, abdominal pain  IR procedure:  1  Thoracentesis: left pleural effusion 300 cc clear fluid removed and sent for analysis  2  Tube check: tube 2 exchanged with larger tube, 40 mL purulent drainage removed  All drains to be evaluated at 2 week tube check  ASSESSMENT: Stable, progressing    PLAN:  1  Leukocytosis- 12 64 (previous 17 07) On Ertapenem  2  Creatinine 1 00, NSS 75 mL/hr    3  Nausea- denies nausea, eating snacks/dinner    Rehabilitation  • Functional deficits:  Decreased foot clearance, shuffle gait  • Continue current rehabilitation plan of care to maximize function      • Functional update:   • PT: bed mobility- supervision, transfer-supervision, ambulation- supervision, RW, 150'  • OT:ADL- bathing- UB min A, LB max A     · Estimated Discharge: TBD with home PT, OT, RN    Pain  • Tylenol, oxycodone    DVT prophylaxis  • Heparin sc    Bladder plan  • Continent    Bowel plan  • Ileostomy     * Malignant neoplasm of transverse colon (HCC)  Assessment & Plan  · Transverse colonic apple core lesion and innumerable hepatic metastases  · S/p Colostomy placement 2/22  · RCW port-a-cath, accessed  · S/p palliative chemo  · Follows with Dr Jaz Valle (palliative)  · Follows hem/onc (Dr Yuli Cook)    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 liver [facility])  Assessment & Plan  · MRI-multiple hepatic metastasis; smaller lesions in left lobe, larger lesions in right lobe  · 11/7 Exp Lap, resection of hepatic segment 3, resection of transverse colon with reversal of loop colostomy (Dr Marilin Camejo)  · 11/16- right hemicolectomy, left discontinuity and temporary abdominal closure device placed  · 11/17- re-exploration, partial descending colectomy, primary colocolonic anastomosis with diverting loop ileostomy, midline VAC placement  · CT showed abdominal abscess and distended gallbladder  · 12/8- IR percutaneous cholecystostomy tube, hepatectomy abscess drain placement  ·  IR on 12/20 additional drains placed for a perisplenic abscess as well as a splenic recess abscess  · Total of 3 drains in place  Elevated alkaline phosphatase level  Assessment & Plan  1125, (previous 1025, 1315)  Hepatic mets  Follow level  ?bone involvement - no evidence of ROM limitations or pain complaints on survey  Follow up with Surg Onc    Dehiscence of incision  Assessment & Plan  Improving  Wound Care following and surgical oncology following   [facility] present  Surgical oncology- no signs of infection, recommend Santyl, ABD, paper tape     [address] as follows:   Skin Care Plan:  1-Midline Incision and Right Abdomen Wounds: Cleanse wounds with NS and pat dry  Apply Santyl to slough tissue on wound bed nickel thick  Cover with ABD and secure with minimal tape  Change daily or PRN soilage or displacement  2-Turn/reposition q2h or when medically stable for pressure re-distribution on skin   3-Elevate heels to offload pressure  4-Moisturize skin daily with skin nourishing cream  5-Ehob cushion in chair when out of bed  6-Preventative Hydraguard to bilateral heels and bilateral sacro-buttocks BID and PRN          Leukocytosis  Assessment & Plan  · WBC 17 07 (previous 16 66, 13 34, 12 41)  · 01/11-repeat CT abd/pelvis w/ contrast  · Febrile- blood cultures sent, Ertapenem restarted  · Monitor temps      Acute pain  Assessment & Plan  · Managed on Scheduled Tylenol 975 mg Y3hsada  · Managed on Robaxin 500 mg BId  · Oxycodone IR PRN  · Managed well    Abscess  Assessment & Plan  · Abdominal abscess- ESBL E Coli - Contact precautions  · Zosyn completed  · Completed Ertapenem course 12/28/22  · Starting Doxycycline 100 mg Q12h 12/30/22 per ID consultants    · Doxycycline stopped, Ertapenem restarted complete 01/07    · 3 drains placed by IR  · Monitor 3 drain output:  · Abscess drain abdomen A: 0 cc   · Abscess drain back: 0-10 cc  · Abscess drain abdomen B: 15 - 20cc    1/3- tube check w/ IR  Tube position check  Patient having some discomfort in left abdomen while supine - per IR tubes in place, bulbs changed, recheck tube placement in 2 weeks  1/4- drain #3 developed sanguineous drainage with flushing, IR evaluated - flushing appropriately  Continue daily flushes on all the drains and monitor output  Reach out to IR if drainage is <10 cc per day for 2 days  Sepsis ([address] )  Assessment & Plan  · SIRS versus sepsis at this time given his vitals, leukocytosis, and complicated infection history  · Mgmt per ID, IM  · 12/20-IR perisplenic, perigastric drain placement for 2 additional abscesses-  · Perigastric abscess +E  Coli ESBL  · Blood cultures negative     Acute on chronic kidney failure [facility])  Assessment & Plan  · Creatinine baseline 1 2-1 4  · Cr = 1 16  · Monitor BMP    Bacteremia  Assessment & Plan  · Abx completed 01/07  · Monitor CBC    Iron deficiency anemia, unspecified  Assessment & Plan  · Hbg = 7 9 (previous 8 0)  · 12/15-1 unit PRBc  · 12/16- symptomatic- 1 unit PRBc  · 12/28- 1 unit PRBc  · Monitor CBC    Obesity (BMI 30-39  9)  Assessment & Plan  · BMI 33 0  · Current BMI 26 98  · Nutrition consult      Benign essential hypertension  Assessment & Plan  · Home: Norvasc 5 mg BID  · Here: Norvasc 5 mg BID  · Adjust medication as needed    Type 2 diabetes mellitus without complication, with long-term current use of insulin (HCC)  Assessment & Plan  · HbgA1c 8 0 (9/22)  · Home monitoring with Nilesh  · Home: Lantus 12 units, Glyburide, Januvia, Metformin  · Here: Lantus 10 units,  Humalog 4 units with meals, SSI   · Follow with PCP (Dr Hosea Moritz)      Appreciate IM consultants medical co-management  Labs, medications, and imaging reviewed       ROS:  Review of Systems       OBJECTIVE:   /75 (BP Location: Left arm)   Pulse 67   Temp 97 6 °F (36 4 °C) (Oral)   Resp 17   Ht 5' 10" (1 778 m)   Wt 85 3 kg (188 lb)   SpO2 98%   BMI 26 98 kg/m²     Physical Exam   Lab Results   Component Value Date    WBC 12 64 (H) 01/12/2023    HGB 7 3 (L) 01/12/2023    HCT 23 5 (L) 01/12/2023    MCV 85 01/12/2023     01/12/2023     Lab Results   Component Value Date    SODIUM 136 01/12/2023    K 3 9 01/12/2023     01/12/2023    CO2 20 (L) 01/12/2023    BUN 16 01/12/2023    CREATININE 1 00 01/12/2023    GLUC 127 01/12/2023    CALCIUM 8 5 01/12/2023     Lab Results   Component Value Date    INR 1 12 12/19/2022    INR 1 10 11/12/2022    INR 1 24 (H) 11/09/2022    PROTIME 14 7 (H) 12/19/2022    PROTIME 14 4 11/12/2022    PROTIME 15 8 (H) 11/09/2022       Current Facility-Administered Medications:   •  acetaminophen (TYLENOL) tablet 325 mg, 325 mg, Oral, Q4H PRN, Mariela Powell MD  •  acetaminophen (TYLENOL) tablet 975 mg, 975 mg, Oral, Q8H [facility], GAURANG Keith, 975 mg at 01/12/23 0454  •  amLODIPine (NORVASC) tablet 5 mg, 5 mg, Oral, BID, GAURANG Keith, 5 mg at 01/11/23 1749  •  aspirin chewable tablet 81 mg, 81 mg, Oral, Daily, GAURANG Keith, 81 mg at 01/11/23 0817  •  atorvastatin (LIPITOR) tablet 40 mg, 40 mg, Oral, Daily, GAURANG Keith, 40 mg at 01/11/23 0817  •  calcium carbonate (TUMS) chewable tablet 500 mg, 500 mg, Oral, BID PRN, GAURANG Keith, 500 mg at 01/06/23 1402  •  cholecalciferol (VITAMIN D3) tablet 400 Units, 400 Units, Oral, Daily, GAURANG Keith, 400 Units at 01/11/23 0820  •  collagenase (SANTYL) ointment, , Topical, Daily, Mariela Powell MD, 1 application at 63/44/44 8141  •  ertapenem (INVanz) 1,000 mg in sodium chloride 0 9 % 50 mL IVPB, 1,000 mg, Intravenous, Q24H, Pascal Kawasaki, MD, Last Rate: 100 mL/hr at 01/11/23 2145, 1,000 mg at 01/11/23 2145  •  heparin (porcine) subcutaneous injection 5,000 Units, 5,000 Units, Subcutaneous, Q8H [address], GAURANG Keith, 5,000 Units at 01/11/23 2145  •  insulin glargine (LANTUS) subcutaneous injection 10 Units 0 1 mL, 10 Units, Subcutaneous, HS, GAURANG Graf  •  insulin lispro (HumaLOG) 100 units/mL subcutaneous injection 1-5 Units, 1-5 Units, Subcutaneous, HS, GAURANG Keith, 1 Units at 01/10/23 2155  •  insulin lispro (HumaLOG) 100 units/mL subcutaneous injection 1-6 Units, 1-6 Units, Subcutaneous, TID AC, 1 Units at 01/11/23 1801 **AND** Fingerstick Glucose (POCT), , , TID AC, GAURANG Keith  •  insulin lispro (HumaLOG) 100 units/mL subcutaneous injection 4 Units, 4 Units, Subcutaneous, TID With Meals, GAURANG Solorio, 4 Units at 01/11/23 9061  •  iohexol (OMNIPAQUE) 240 MG/ML solution 35 mL, 35 mL, Oral, 90 min pre-procedure, GAURANG Graf  •  melatonin tablet 3 mg, 3 mg, Oral, HS, GAURANG Keith, 3 mg at 01/11/23 1937  •  methocarbamol (ROBAXIN) tablet 500 mg, 500 mg, Oral, BID, GAURANG Keith, 500 mg at 01/11/23 1749  •  multivitamin-minerals (CENTRUM) tablet 1 tablet, 1 tablet, Oral, Daily, GAURANG Keith, 1 tablet at 01/11/23 0817  •  ondansetron (ZOFRAN-ODT) dispersible tablet 4 mg, 4 mg, Oral, Q6H PRN, GAURANG Keith, 4 mg at 01/11/23 1503  •  ondansetron (ZOFRAN-ODT) dispersible tablet 4 mg, 4 mg, Oral, BID, GAURANG Graf, 4 mg at 01/11/23 1937  •  oxyCODONE (ROXICODONE) immediate release tablet 10 mg, 10 mg, Oral, Q6H PRN, GAURANG Keith, 10 mg at 01/11/23 2352  •  oxyCODONE (ROXICODONE) IR tablet 2 5 mg, 2 5 mg, Oral, Q3H PRN, GAURANG Keith, 2 5 mg at 12/25/22 1911  •  oxyCODONE (ROXICODONE) IR tablet 5 mg, 5 mg, Oral, Q6H PRN, GAURANG Keith, 5 mg at 01/11/23 1758  •  pantoprazole (PROTONIX) EC tablet 40 mg, 40 mg, Oral, BID AC, GAURANG Keith, 40 mg at 01/12/23 0479  •  simethicone (MYLICON) chewable tablet 80 mg, 80 mg, Oral, 4x Daily (with meals and at bedtime), GAURANG Keith, 80 mg at 01/11/23 2146  •  sodium chloride 0 9 % infusion, 75 mL/hr, Intravenous, Continuous, Floyce GAURANG Ely, Last Rate: 75 mL/hr at 01/11/23 0247, 75 mL/hr at 01/11/23 0247  •  tamsulosin (FLOMAX) capsule 0 4 mg, 0 4 mg, Oral, Daily With Dinner, GAURANG Coello, 0 4 mg at 01/10/23 1743    Past Medical History:   Diagnosis Date   • Abdominal pain 03/12/2022   • Acute renal failure ([place] [address] )     [id number] resolved   • Cancer ([place] [address] )    • Diabetes mellitus ([place] [address] )    • Enteritis 08/23/2016   • Gastroparesis due to DM ([place] [address] ) 08/23/2016   • GERD (gastroesophageal reflux disease)    • Hernia, ventral 08/04/2016   • Hyperlipidemia    • Hypertension    • Morbid obesity ([place] [address] ) 04/17/2018   • Postoperative visit 03/02/2022   • SIRS (systemic inflammatory response syndrome) ([place] [address] ) 03/12/2022   • Snoring    • Stage 3a chronic kidney disease ([place] [address] ) 02/19/2022       Patient Active Problem List    Diagnosis Date Noted   • Malignant neoplasm of transverse colon ([place] [address] ) 03/01/2022   • 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 liver ([place] [address] ) 03/02/2022   • Dehiscence of incision 12/30/2022   • Elevated alkaline phosphatase level 12/30/2022   • Leukocytosis 12/29/2022   • Abscess 12/27/2022   • Acute pain 12/27/2022   • Sepsis ([place] [address] ) 12/17/2022   • Severe protein-calorie malnutrition ([place] [address] ) 12/14/2022   • Acute on chronic kidney failure ([place] [address] ) 12/14/2022   • Flash pulmonary edema ([place] [address] ) 11/12/2022   • MR (mitral regurgitation) 11/12/2022   • Bacteremia 11/12/2022   • ESBL (extended spectrum beta-lactamase) producing bacteria infection 11/12/2022   • Acute respiratory failure with hypoxia ([facility] ) 11/12/2022   • Encephalopathy 11/09/2022   • Cervical radiculopathy 10/26/2022   • Other fatigue 06/15/2022   • Colostomy prolapse ([facility] ) 05/27/2022   • Colon cancer metastasized to liver ([place] [address] ) 03/12/2022   • Iron deficiency anemia, unspecified 03/01/2022   • Thrombocytosis 02/21/2022   • Transaminitis 02/19/2022   • Type 2 diabetes mellitus with hyperlipidemia ([facility] ) 02/18/2022   • Microcytic anemia 02/18/2022   • Left ureteral calculus 01/30/2020   • Incarcerated umbilical hernia 86/90/2688   • Testicular hypogonadism 06/19/2017   • Low testosterone 05/30/2017   • Type 2 diabetes mellitus without complication, with long-term current use of insulin ([facility] ) 08/23/2016   • Benign essential hypertension 08/23/2016   • Mixed hyperlipidemia 08/23/2016   • Erectile dysfunction 07/11/2016   • Obesity (BMI 30-39 9) 07/11/2016      GAURANG Lopez  Physical Medicine and Chris Luu    Total visit time: 25 minutes, with more than 50% spent counseling/coordinating care  Counseling includes discussion with patient re: progress in therapies, functional issues observed by therapy staff, and discussion with patient regarding their current medical state and wellbeing  Coordination of patient's care was performed in conjunction with Internal Medicine service to monitor patient's labs, vitals, and management of their comorbidities

## 2023-01-12 NOTE — PROGRESS NOTES
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  • s/p hepatic resection and reversal of colostomy on 11/7  • s/p right hemicolectomy with partial descending colectomy colocolonic anastomosis with loop ileostomy and mid line abdominal VAC placement 11/16  • WC following  • CT  Abd/pelvis 12/17 = loculated collection along splenic recess   Has perisplenic hilum collection as well --> to IR 12/20 for drain placement in both perigastric and perisplenic area and cx sent from both areas = Ecoli  • Flush drains with 10ml qd; no output recorded for 1/10  • ileostomy 1/10 = 550 ml  • S-O following/Dr Geovanna Rosen   • Tube check (incl perc alvina tube) done 1/3 = unchanged with still some fluid around each drain so kept in     • Bloody drainage in VICTORINO #3 and IR in to see pt and felt 2/2 tube manipulation when he went for his tube check = resolved  • On 1/10/23, WBC jumped, febrile to 102 4 --> Ertapenem restarted  CT A/P showed a slightly enlarging perihepatic collection near segment 3 --> for tube check/upsizing today 1/12/23  • BCs 1/10 = NG x 24 hrs     Acute cholecystitis  • s/p percutaneous tube placement 12/8 with tube check on 12/12, 12/14  • GI saw due to alk phos elevation = felt 2/2 infiltrative disease rather than focal biliary obstruction   AMA was negative     • Alkaline phosphatase isoenzyme sent 12/17 (48% liver/52% bone) = GI  recommended continuing to monitor his LFTs and defer to surgical oncology for further w/u and plan      • TB normalized  • GI wrote note 1/4 = aware labs incl GGT of 1992 (previously 5031 on 12/17)  • Alvina tube = no output recorded for 1/11/23       • Per GI, do cholangiogram with next tube check since wasn't done 1/3 but d/w Dr Geovanna Rosen and he did not want done with today's tube check 1/12/23     Bacteremia/abdominal abscess  • ID following   • s/p Ertapenem was switched to Doxycycline per ID as of 12/30 but had to be switched back 2/2 nausea  • Ertapenem restarted 1/10/23  • Started IVF 1/10/23 since creat was 1 4 and to get dye  • WBC up to 17 on 1/11, now down to 12 6     Hypertension  • On Norvasc 5mg BID  • stable     Diabetes mellitus  • Home:  Glargine 35U qam/Glyburide-Metformin 5-500 qam/Januvia 100mg qam  • Here:  Lantus 10U qhs/Lispro 4U TID  • Lispro to be held if pt does not eat  • Has a DEXCOM meter at home  • Continue DM diet and QID Accuchecks/SSI     Acute on chronic renal failure  • Stage III; baseline 1 2-1 4  • Lisinopril currently on hold  • Creat down now from 1 4 to 1 0 with getting IVF      Anemia  • Multifactorial due to recent infection, surgery, CKD stage III  • Transfused on 12/15, 12/16/22 and 12/30  • Hemoglobin 7 3 today 1/12/23 = drop is likely dilutional related from getting IVF     Atypical chest pain  • With elevation in troponin/EKG changes on acute side  • Cardiology saw then and cleared pt for discharge  • Did not recommend any further work up  • No further chest pain     Situational depression  • Neuropsychology consulted  • Patient not interested in medications at this point in time     Malnutrition  • Glucerna makes him nauseated  • On 12/15, ordered double protein     Pleural effusion  • CXR on 12/14 showed small left pleural effusion and was suspect for CHF  • ECHO 11/9/22 = LVEF 55%, unable to assess diastolic function, mild TR     L sided pleuritic pain  • located near drain site and per patient only occurs at night        Discharge date:  TBA       The above assessment and plan was reviewed and updated as determined by my evaluation of the patient on 1/12/2023      Labs:   Results from last 7 days   Lab Units 01/12/23  0452 01/11/23  0555   WBC Thousand/uL 12 64* 17 07*   HEMOGLOBIN g/dL 7 3* 7 9*   HEMATOCRIT % 23 5* 25 4* PLATELETS Thousands/uL 343 388     Results from last 7 days   Lab Units 01/12/23  0452 01/11/23  0555   SODIUM mmol/L 136 138   POTASSIUM mmol/L 3 9 4 4   CHLORIDE mmol/L 107 110*   CO2 mmol/L 20* 20*   BUN mg/dL 16 20   CREATININE mg/dL 1 00 1 16   CALCIUM mg/dL 8 5 8 7             Results from last 7 days   Lab Units 01/12/23  1046 01/12/23  0656 01/11/23  2144   POC GLUCOSE mg/dl 138 115 120       Review of Scheduled Meds:  Current Facility-Administered Medications   Medication Dose Route Frequency Provider Last Rate   • acetaminophen  325 mg Oral Q4H PRN Ronni Clark MD     • acetaminophen  975 mg Oral Q8H [address] GAURANG Keith     • amLODIPine  5 mg Oral BID GAURANG Keith     • aspirin  81 mg Oral Daily GAURANG Keith     • atorvastatin  40 mg Oral Daily GAURANG Keith     • calcium carbonate  500 mg Oral BID PRN GAURANG Jimenez     • cholecalciferol  400 Units Oral Daily GAURANG Keith     • collagenase   Topical Daily Ronni Clark MD     • ertapenem  1,000 mg Intravenous Q24H Tucker Gardiner MD 1,000 mg (01/11/23 2145)   • heparin (porcine)  5,000 Units Subcutaneous Q8H [address] GAURANG Keith     • insulin glargine  10 Units Subcutaneous HS GAURANG Lazcano     • insulin lispro  1-5 Units Subcutaneous HS GAURANG Keith     • insulin lispro  1-6 Units Subcutaneous TID AC GAURANG Keith     • insulin lispro  4 Units Subcutaneous TID With Meals GAURANG Lazcano     • iohexol  35 mL Oral 90 min pre-procedure GAURANG Gee     • melatonin  3 mg Oral HS GAURANG Keith     • methocarbamol  500 mg Oral BID GAURANG Jimenez     • multivitamin-minerals  1 tablet Oral Daily GAURANG Keith     • ondansetron  4 mg Oral Q6H PRN GAURANG Keith     • ondansetron  4 mg Oral BID GAURANG Gee     • oxyCODONE  10 mg Oral Q6H PRN GAURANG Jimenez     • oxyCODONE  2 5 mg Oral Q3H PRN GAURANG Jimenez • oxyCODONE  5 mg Oral Q6H PRN GAURANG Keith     • pantoprazole  40 mg Oral BID AC GAURANG Keith     • simethicone  80 mg Oral 4x Daily (with meals and at bedtime) Caroleen Alpers, CRNP     • sodium chloride  75 mL/hr Intravenous Continuous Lafaye Najjar, CRNP 75 mL/hr (01/11/23 0247)   • tamsulosin  0 4 mg Oral Daily With Dinner Caroleen Alpers, CRNP         Subjective/ HPI: Patient seen and examined  Patients overnight issues or events were reviewed with nursing or staff during rounds or morning huddle session  New or overnight issues include the following:     No new or overnight issues  Offers no complaints    ROS:   A 10 point ROS was performed; negative except as noted above  Imaging:     CT abdomen pelvis w contrast   Final Result by Stephany Heller MD (01/11 1812)      1  Moderate to large left effusion with adjacent atelectasis  2  Interval worsening left hepatic postsurgical site fluid collection now measuring 4 2 x 3 2 cm  Correlate with output  3  Resolution of subtle splenic collection with catheter in place  Bilobed thick walled fluid collection is smaller inferior to this         4  Percutaneous cholecystostomy tube in place with persistent surrounding inflammatory changes  Unchanged subhepatic collection  5  Tumor involvement involving the right hepatic lobe appears slightly worsened  Workstation performed: [id number]         IR drainage tube check and/or removal   Final Result by Armen Leiva MD (01/04 1631)   Impression:   1  Drain check demonstrated all 3 drains to be patent  No fistula to the bowel  There are still small collections around each drain and therefore the drains were left in place  PLAN: Tubes to bag drainage  Return in 2 weeks for tube checks          Workstation performed: [id number]         IR drainage tube placement   Final Result by Ricarda Nuñez MD (12/20 2003)      Percutaneous placement of abscess drainage catheters into the perigastric and perisplenic abscesses using 10-French catheters  Plan:       - Catheters to bulb suction drainage    - Flush catheters with 10 mL normal saline daily    - Return in 2 weeks for drain check  Workstation performed: [id number]         CT abdomen pelvis w contrast   Final Result by Clay Baltazar MD (65/27 1149)      Status post cholecystostomy tube placement with decompression of the gallbladder  In addition, a catheter placed adjacent to the cut edge of the liver is increased position with near complete resolution of fluid collection  Loculated collection along the splenic recess of the lesser sac is unchanged in size  Additional perisplenic collection in the hilum and along the inferior pole are unchanged  Collection of fluid and gas in the left upper quadrant adjacent to left kidney also not significantly changed    No extravasated oral contrast             Workstation performed: [id number]         IR drainage tube check/change/reposition/reinsertion/upsize    (Results Pending)   IR cholecystostomy tube check and/or removal    (Results Pending)   IR IN-Patient Thoracentesis    (Results Pending)   IR drainage tube check/change/reposition/reinsertion/upsize    (Results Pending)       *Labs /Radiology studies reviewed  *Medications reviewed and reconciled as needed  *Please refer to order section for additional ordered labs studies  *Case discussed with primary attending during morning huddle case rounds    Physical Examination:  Vitals:   Vitals:    01/11/23 2226 01/12/23 0134 01/12/23 0450 01/12/23 0936   BP: 134/72 117/71 133/75 118/72   BP Location: Left arm Left arm Left arm Right arm   Pulse: 103 68 67 66   Resp: 16 17 17 16   Temp: 98 6 °F (37 °C) 98 7 °F (37 1 °C) 97 6 °F (36 4 °C) 97 5 °F (36 4 °C)   TempSrc: Oral Oral Oral Oral   SpO2: 96% 97% 98%    Weight:       Height:           General Appearance: no distress, conversive  HEENT: PERRLA, conjuctiva normal; oropharynx clear; mucous membranes moist   Neck:  Supple, normal ROM  Lungs: CTA, normal respiratory effort, no retractions, expiratory effort normal  CV: regular rate and rhythm; no rubs/murmurs/gallops, PMI normal   ABD: soft; ND/NT; +BS  EXT: no edema  Skin: normal turgor, normal texture, no rashes  Psych: affect normal, mood normal  Neuro: AAO      The above physical exam was reviewed and updated as determined by my evaluation of the patient on 1/12/2023  Invasive Devices     Central Venous Catheter Line  Duration           Port A Cath 03/10/22 Right Chest 308 days          Drain  Duration           Ileostomy LUQ 55 days    Abscess Drain Abdomen 34 days    Cholecystostomy Tube 30 days    Abscess Drain Abdomen 22 days    Abscess Drain Back 22 days                   VTE Pharmacologic Prophylaxis: Heparin  Code Status: Level 1 - Full Code  Current Length of Stay: 29 day(s)      Total time spent:  30 minutes with more than 50% spent counseling/coordinating care  Counseling includes discussion with patient re: progress  and discussion with patient of his/her current medical state/information  Coordination of patient's care was performed in conjunction with primary service  Time invested included review of patient's labs, vitals, and management of their comorbidities with continued monitoring  In addition, this patient was discussed with medical team including physician and advanced extenders  The care of the patient was extensively discussed and appropriate treatment plan was formulated unique for this patient  Medical decision making for the day was made by supervising physician unless otherwise noted in their attestation statement  ** Please Note:  voice to text software may have been used in the creation of this document   Although proof errors in transcription or interpretation are a potential of such software**

## 2023-01-12 NOTE — TELEMEDICINE
e-Consult (IPC)  - Interventional Radiology  Enedina Lama 61 y o  male MRN: [id number]  Unit/Bed#: [facility] 456-36 Encounter: [id number]          Interventional Radiology has been consulted to evaluate Enedina Lama    We were consulted by Surgery concerning this patient with multiple drains  Inpatient Consult to IR  Consult performed by: [facility], GAURANG  Consult ordered by: Yury Godwin MD        01/12/23    Assessment/Recommendation:   Thejunaid Sires, 62y male who is s/p 3 segment liver resection with transverse colectomy followed by a right colectomy for a leak and drainage of multiple abscesses/hematoma  Patient is s/p Alvina tube placement 12/8/22 and hepatecomy bed abscess drain, abdominal abscess drain placement 12/20/22  Alvina tube reposition on 12/12/22 and drain check x 3 on 1/3/23  Patient remains with 3 drains  Last recorded output was on 1/10/23 with approximately 20/30 ml output for each drain  All three drains are 10 2 Fr  Patient also with a left pleural effusion  IR planning for a left thoracentesis this afternoon  Will plan for drain check/change/repo x 3 this afternoon  Plan -   Drain check x 3 this afternoon - timing to be determined by team availability  Patient does not need to be NPO for this procedure       21-30 minutes, >50% of the total time devoted to medical consultative verbal/EMR discussion between providers  Written report will be generated in the EMR  Thank you for allowing Interventional Radiology to participate in the care of Enedina Lama  Please don't hesitate to call or TigerText us with any questions       [facility]

## 2023-01-12 NOTE — PROGRESS NOTES
Progress Note - Infectious Disease   Norma Tabor 61 y o  male MRN: [id number]  Unit/Bed#: -01 Encounter: [id number]      Impression/Plan:    Sepsis     Evolving 12/17:  WBC, HR   Suspect due to persistent left intra-abdominal infection in setting of complex history below   ROS and exam otherwise negative   Recent Flu/RSV/COVID PCR, CXR negative   Patient with noted decreased drain output  Repeat CT AP obtained 1/11 showed a moderate to large left pleural effusion, worsening left hepatic postsurgical fluid collection, resolution of splenic collection with catheter in place  Status post left thoracentesis and drain upsized today  Rec:  • Continue Ertapenem for now  • Follow drain output   • Follow-up pleural fluid culture  • Follow temperatures closely  • Recheck CBC in AM  • Supportive care as per the primary service     Intra-abdominal abscess     In the setting complex surgical history as below   Initially developed 11/2022 accompanied by ESBL E  coli bacteremia   Status post exploratory laparotomy, right hemicolectomy, temporary abdominal closure 11/16; re-exploration, partial left colectomy, primary ileocolonic anastomosis with proximal diverting loop ileostomy, midline fascial closure on 11/17   More recently developed abscess in hepatectomy bed, also collection +/- leak at area of prior colonic anastamosis, 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   Status post IR drain into perihepatic collection 12/8   Cultures with ESBL E  Coli   Status post 7 days ertapenem after drain placement through 12/15   Developed recurrent sepsis and repeat CT 12/17 shows hepatic bed collection improved, persistent left intra-abdominal collection   Status post perigastic, perisplenic drains 12/20   Cultures again with ESBL E  Coli   Status post 21 days total antibiotics, completed 1/7   Recurrent fever and leukocytosis as above 1/10 in setting of decreased drain output   CT AP obtained 1/11 showed a moderate to large left pleural effusion, worsening left hepatic postsurgical fluid collection, resolution of splenic collection with catheter in place  Status post left thoracentesis and drain upsized today  Rec:  • Continue ertapenem as above   • Drain management per surgery and IR  • Anticipate additional 5 days of antibiotics after drain upsizing, final plan pending clinical course     Possible acute cholecystitis     Status post percutaneous cholecystostomy tube   Alk phos remains markedly elevated, possibly due to drain itself versus known intrahepatic metastases      Metastatic colon cancer   To liver, possible lung   Status post resection, chemotherapy, more recent hepatic resection and ablation, transverse colon resection      CKD   Baseline Cr 1 4       DM      Anemia   Status post multiple transfusions  Above management plan discussed with the patient at bedside  ID will follow    Antibiotics:  Ertapenem restart day 3    Subjective:  Patient is feeling tired  Fever curve and white blood cell count improving after antibiotic restart  He has mild abdominal pain  Plan for IR drain check today  Objective:  Vitals:  Temp:  [97 5 °F (36 4 °C)-100 1 °F (37 8 °C)] 97 5 °F (36 4 °C)  HR:  [] 75  Resp:  [16-18] 16  BP: (117-136)/(67-81) 126/81  SpO2:  [93 %-99 %] 95 %  Temp (24hrs), Av 3 °F (36 8 °C), Min:97 5 °F (36 4 °C), Max:100 1 °F (37 8 °C)  Current: Temperature: 97 5 °F (36 4 °C)    Physical Exam:   General Appearance:   Debilitated appearing but nontoxic, no acute distress  Throat: Oropharynx moist without lesions  Lungs:   Clear to auscultation bilaterally; no wheezes, rhonchi or rales; respirations unlabored   Heart:  RRR; no murmur, rub or gallop   Abdomen:    3 abdominal drains, right upper quadrant drain with purulent output   Extremities: No clubbing, cyanosis or edema   Skin: No new rashes or lesions  No draining wounds noted  Labs:    All pertinent labs and imaging studies were personally reviewed  Results from last 7 days   Lab Units 01/12/23  0452 01/11/23  0555 01/10/23  0959   WBC Thousand/uL 12 64* 17 07* 16 66*   HEMOGLOBIN g/dL 7 3* 7 9* 7 9*   PLATELETS Thousands/uL 343 388 383     Results from last 7 days   Lab Units 01/12/23  0452 01/11/23  0555 01/10/23  0959 01/09/23  0533 01/06/23  0618   SODIUM mmol/L 136 138 134* 136 135   POTASSIUM mmol/L 3 9 4 4 4 5 4 0 3 7   CHLORIDE mmol/L 107 110* 108 109* 107   CO2 mmol/L 20* 20* 20* 20* 20*   BUN mg/dL 16 20 25 24 20   CREATININE mg/dL 1 00 1 16 1 44* 1 32* 1 13   EGFR ml/min/1 73sq m 82 69 53 59 71   CALCIUM mg/dL 8 5 8 7 8 5 8 5 8 7   AST U/L  --   --   --  45 43   ALT U/L  --   --   --  13 12   ALK PHOS U/L  --   --   --  1,120* 1,025*                       Micro:  Results from last 7 days   Lab Units 01/10/23  2125   BLOOD CULTURE  No Growth at 24 hrs  No Growth at 24 hrs  Imaging:  Pertinent imaging in PACS personally reviewed

## 2023-01-12 NOTE — BRIEF OP NOTE (RAD/CATH)
INTERVENTIONAL RADIOLOGY PROCEDURE NOTE    Date: 1/12/2023    Procedure: IR THORACENTESIS     Preoperative diagnosis:   1  Type 2 diabetes mellitus without complication, with long-term current use of insulin ([facility] )    2  Colonic mass    3  Obesity (BMI 30-39 9)    4  Malignant neoplasm of transverse colon ([facility] )    5  Severe protein-calorie malnutrition ([facility] )    6  Bacteremia    7  Alkaline phosphatase elevation    8  Leukocytosis, unspecified type    9  Abdominal wall abscess    10  Intra-abdominal fluid collection    11  Abscess    12  Cholecystitis    13  Postprocedural intraabdominal abscess       Postoperative diagnosis: Same  Surgeon: Pablo Penaloza MD     Assistant: None  No qualified resident was available  Blood loss: minimal    Specimens: 300 cc of left pleural effusion  Findings: Successful US guided left thoracentesis with 300 cc of clear fluid removed and sent for analysis  Complications: None immediate      Anesthesia: local and IV Fentanyl

## 2023-01-12 NOTE — PROGRESS NOTES
PM&R Progress Note:     Subjective: Patient seen post procedure  Sleepy, but pain is improved  Eating pretzels currently  Update provided to patient's wife Lindsey Gonzales on the phone and answered questions  Discharge home TBD and more clarification tomorrow  Assessment/Plan:  Patient stable, currently afebrile  Rehabilitation plan:  ? Current Function: Meeting his goals of supervision level in both PT and OT  Occasionally requires Min A due to decreased endurance  ? Patient is participating  Today was able to do bed level therapies mostly as he was NPO  ? Dispo: home pending finalized plan for antibiotics and clinical stability and arrangement for home care RN to come out next day        Medical Plan:  Sepsis/Intra-abdominal abscess:   ? Patient s/p IR tube checks and thoracentesis today  ? Tube #2 exchanged  ? 300 cc clear fluid from thoracentesis today from left pleural space  ?  CBC/diff in AM   ? Case management did confirm IV Ertapenem can be ordered for home through the home care company if needed - will await determination from [address] [place], [place] [address]keiko Rivas MD  PM&R

## 2023-01-12 NOTE — PLAN OF CARE
Problem: Prexisting or High Potential for Compromised Skin Integrity  Goal: Skin integrity is maintained or improved  Description: INTERVENTIONS:  - Identify patients at risk for skin breakdown  - Assess and monitor skin integrity  - Assess and monitor nutrition and hydration status  - Monitor labs   - Assess for incontinence   - Turn and reposition patient  - Assist with mobility/ambulation  - Relieve pressure over bony prominences  - Avoid friction and shearing  - Provide appropriate hygiene as needed including keeping skin clean and dry  - Evaluate need for skin moisturizer/barrier cream  - Collaborate with interdisciplinary team   - Patient/family teaching  - Consider wound care consult   Outcome: Progressing     Problem: Potential for Falls  Goal: Patient will remain free of falls  Description: INTERVENTIONS:  - Educate patient/family on patient safety including physical limitations  - Instruct patient to call for assistance with activity   - Consult OT/PT to assist with strengthening/mobility   - Keep Call bell within reach  - Keep bed low and locked with side rails adjusted as appropriate  - Keep care items and personal belongings within reach  - Initiate and maintain comfort rounds  - Make Fall Risk Sign visible to staff  - Offer Toileting every 2-4 Hours, in advance of need  - Initiate/Maintain bed/chair alarm  - Obtain necessary fall risk management equipment: nonskid footwear   - Apply yellow socks and bracelet for high fall risk patients  - Consider moving patient to room near nurses station  Outcome: Progressing     Problem: PAIN - ADULT  Goal: Verbalizes/displays adequate comfort level or baseline comfort level  Description: Interventions:  - Encourage patient to monitor pain and request assistance  - Assess pain using appropriate pain scale  - Administer analgesics based on type and severity of pain and evaluate response  - Implement non-pharmacological measures as appropriate and evaluate response  - Consider cultural and social influences on pain and pain management  - Notify physician/advanced practitioner if interventions unsuccessful or patient reports new pain  Outcome: Progressing     Problem: INFECTION - ADULT  Goal: Absence or prevention of progression during hospitalization  Description: INTERVENTIONS:  - Assess and monitor for signs and symptoms of infection  - Monitor lab/diagnostic results  - Monitor all insertion sites, i e  indwelling lines, tubes, and drains  - Monitor endotracheal if appropriate and nasal secretions for changes in amount and color  - [place] appropriate cooling/warming therapies per order  - Administer medications as ordered  - Instruct and encourage patient and family to use good hand hygiene technique  - Identify and instruct in appropriate isolation precautions for identified infection/condition  Outcome: Progressing     Problem: SAFETY ADULT  Goal: Patient will remain free of falls  Description: INTERVENTIONS:  - Educate patient/family on patient safety including physical limitations  - Instruct patient to call for assistance with activity   - Consult OT/PT to assist with strengthening/mobility   - Keep Call bell within reach  - Keep bed low and locked with side rails adjusted as appropriate  - Keep care items and personal belongings within reach  - Initiate and maintain comfort rounds  - Make Fall Risk Sign visible to staff  - Offer Toileting every 2-4 Hours, in advance of need  - Initiate/Maintain bed/chair alarm  - Obtain necessary fall risk management equipment: nonskid footwear  - Apply yellow socks and bracelet for high fall risk patients  - Consider moving patient to room near nurses station  Outcome: Progressing  Goal: Maintain or return to baseline ADL function  Description: INTERVENTIONS:  -  Assess patient's ability to carry out ADLs; assess patient's baseline for ADL function and identify physical deficits which impact ability to perform ADLs (bathing, care of mouth/teeth, toileting, grooming, dressing, etc )  - Assess/evaluate cause of self-care deficits   - Assess range of motion  - Assess patient's mobility; develop plan if impaired  - Assess patient's need for assistive devices and provide as appropriate  - Encourage maximum independence but intervene and supervise when necessary  - Involve family in performance of ADLs  - Assess for home care needs following discharge   - Consider OT consult to assist with ADL evaluation and planning for discharge  - Provide patient education as appropriate  Outcome: Progressing  Goal: Maintains/Returns to pre admission functional level  Description: INTERVENTIONS:  - Perform BMAT or MOVE assessment daily    - Set and communicate daily mobility goal to care team and patient/family/caregiver  - Collaborate with rehabilitation services on mobility goals if consulted  - Perform Range of Motion 3 times a day  - Reposition patient every 2 hours    - Dangle patient 3 times a day  - Stand patient 3 times a day  - Ambulate patient 3 times a day  - Out of bed to chair 3 times a day   - Out of bed for meals 3 times a day  - Out of bed for toileting  - Record patient progress and toleration of activity level   Outcome: Progressing     Problem: DISCHARGE PLANNING  Goal: Discharge to home or other facility with appropriate resources  Description: INTERVENTIONS:  - Identify barriers to discharge w/patient and caregiver  - Arrange for needed discharge resources and transportation as appropriate  - Identify discharge learning needs (meds, wound care, etc )  - Arrange for interpretive services to assist at discharge as needed  - Refer to Case Management Department for coordinating discharge planning if the patient needs post-hospital services based on physician/advanced practitioner order or complex needs related to functional status, cognitive ability, or social support system  Outcome: Progressing     Problem: Nutrition/Hydration-ADULT  Goal: Nutrient/Hydration intake appropriate for improving, restoring or maintaining nutritional needs  Description: Monitor and assess patient's nutrition/hydration status for malnutrition  Collaborate with interdisciplinary team and initiate plan and interventions as ordered  Monitor patient's weight and dietary intake as ordered or per policy  Utilize nutrition screening tool and intervene as necessary  Determine patient's food preferences and provide high-protein, high-caloric foods as appropriate       INTERVENTIONS:  - Monitor oral intake, urinary output, labs, and treatment plans  - Assess nutrition and hydration status and recommend course of action  - Evaluate amount of meals eaten  - Assist patient with eating if necessary   - Allow adequate time for meals  - Recommend/ encourage appropriate diets, oral nutritional supplements, and vitamin/mineral supplements  - Order, calculate, and assess calorie counts as needed  - Recommend, monitor, and adjust tube feedings and TPN/PPN based on assessed needs  - Assess need for intravenous fluids  - Provide specific nutrition/hydration education as appropriate  - Include patient/family/caregiver in decisions related to nutrition  Outcome: Progressing     Problem: MOBILITY - ADULT  Goal: Maintain or return to baseline ADL function  Description: INTERVENTIONS:  -  Assess patient's ability to carry out ADLs; assess patient's baseline for ADL function and identify physical deficits which impact ability to perform ADLs (bathing, care of mouth/teeth, toileting, grooming, dressing, etc )  - Assess/evaluate cause of self-care deficits   - Assess range of motion  - Assess patient's mobility; develop plan if impaired  - Assess patient's need for assistive devices and provide as appropriate  - Encourage maximum independence but intervene and supervise when necessary  - Involve family in performance of ADLs  - Assess for home care needs following discharge   - Consider OT consult to assist with ADL evaluation and planning for discharge  - Provide patient education as appropriate  Outcome: Progressing  Goal: Maintains/Returns to pre admission functional level  Description: INTERVENTIONS:  - Perform BMAT or MOVE assessment daily    - Set and communicate daily mobility goal to care team and patient/family/caregiver  - Collaborate with rehabilitation services on mobility goals if consulted  - Perform Range of Motion 3 times a day  - Reposition patient every 2 hours    - Dangle patient 3 times a day  - Stand patient 3 times a day  - Ambulate patient 3 times a day  - Out of bed to chair 3 times a day   - Out of bed for meals 3 times a day  - Out of bed for toileting  - Record patient progress and toleration of activity level   Outcome: Progressing

## 2023-01-12 NOTE — PROGRESS NOTES
01/12/23 1222   Pain Assessment   Pain Assessment Tool 0-10   Pain Location/Orientation Location: Generalized   Restrictions/Precautions   Precautions Contact/isolation;Multiple lines;Supervision on toilet/commode   Subjective   Subjective pt agreeable to perform skilled IN BED SUPNE  d/t weakness and fatigue IR today and NPO   Roll Left and Right   Type of Assistance Needed Supervision   Roll Left and Right CARE Score 4   Therapeutic Interventions   Strengthening supine Hip add/abd, SLR AP QS gluts , abdominal bracing genlty and reps 10-20 reps 2-3 sets to his tolerance   Assessment   Treatment Assessment Pt focus on thex ex's and LE strengthening and cont to required skilled PT services to increase overall functional independence and safety w/ indpe lvl  and functional transfers  Continue with plan for discharge as return home with increased family support  Cont POC to meet DC goals   Barriers to Discharge Decreased caregiver support   Plan   Progress Slow progress, decreased activity tolerance   Recommendation   PT Discharge Recommendation Home with home health rehabilitation   PT Therapy Minutes   PT Time In 1222   PT Time Out 1300   PT Total Time (minutes) 38   PT Mode of treatment - Individual (minutes) 38   PT Mode of treatment - Concurrent (minutes) 0   PT Mode of treatment - Group (minutes) 0   PT Mode of treatment - Co-treat (minutes) 0   PT Mode of Treatment - Total time(minutes) 38 minutes   PT Cumulative Minutes 1853   Therapy Time missed   Time missed?  No

## 2023-01-12 NOTE — CASE MANAGEMENT
Received call from Kanwal Ortiz at [facility] confirming pt had been approved through the [facility]  [facility] confirmed she received the fax on 1/10 and was inquiring if pt went home  Cm provided verbal update and then faxed through epic to [facility] previous therapy notes and today's medical notes  Awaiting determination

## 2023-01-12 NOTE — PROGRESS NOTES
Progress Note - Surgical Oncology  Beth Cooley 61 y o  male MRN: [id number]  Unit/Bed#: -01 Encounter: [id number]      Assessment:  61 y o  male status post segment 3 liver resection with transverse colectomy followed by right colectomy for a leak and drainage of multiple abscesses/hematoma     Tmax 100 1, HDS on room air      Plan:  - NPO for IR procedure  - IR consulted for thoracentesis and drain check/upsizing  - Diet per primary following procedure  - Antibiotics per ID      Subjective: No acute events overnight  Afebrile, hemodynamically stable  Nauseous, no emesis  Objective:   Temp:  [97 6 °F (36 4 °C)-100 1 °F (37 8 °C)] 97 6 °F (36 4 °C)  HR:  [] 67  Resp:  [16-19] 17  BP: (117-155)/(71-82) 133/75    Physical Exam:  General: No acute distress, alert and oriented  CV: Well perfused, regular rate and rhythm  Lungs: Normal work of breathing, no increased respiratory effort  Abdomen: Soft, non-tender, non-distended  Drains in place  Extremities: No edema, clubbing or cyanosis  Skin: Warm, dry      I/O       01/10 0701  01/11 0700 01/11 0701  01/12 0700    P  O  120 300    I V  (mL/kg) 1033 8 (12 1)     Total Intake(mL/kg) 1153 8 (13 5) 300 (3 5)    Urine (mL/kg/hr) 400 (0 2) 350 (0 2)    Emesis/NG output 0     Drains 55     Stool 400 950    Total Output 855 1300    Net +298 8 -1000          Unmeasured Stool Occurrence  2 x          Lab, Imaging and other studies: I have personally reviewed pertinent reports    , CBC with diff: No results found for: WBC, HGB, HCT, MCV, PLT, ADJUSTEDWBC, MCH, MCHC, RDW, MPV, NRBC, BMP/CMP: No results found for: SODIUM, K, CL, CO2, ANIONGAP, BUN, CREATININE, GLUCOSE, CALCIUM, AST, ALT, ALKPHOS, PROT, BILITOT, EGFR        Edil Eason MD  1/12/2023 6:10 AM

## 2023-01-12 NOTE — PROGRESS NOTES
01/12/23 1430   Assessment   Treatment Assessment pt at IR at this time for Medical procedure;and will resume next session   Therapy Time missed   Time missed?  Yes   Amount of time missed 60   Reason for time missed Medical procedure

## 2023-01-12 NOTE — MALNUTRITION/BMI
This medical record reflects one or more clinical indicators suggestive of malnutrition     Malnutrition Findings:   Adult Malnutrition type: Acute illness  Adult Degree of Malnutrition: Other severe protein calorie malnutrition  Malnutrition Characteristics: Weight loss, Fat loss       360 Statement: Related to catabolic illness and surgery as evidenced by 18% weight loss over the past month and moderate depletion of body fat (Orbitals) treated with diet (patient dislikes oral nutrition supplements)        See Nutrition note dated 1/12/23 for additional details  Completed nutrition assessment is viewable in the nutrition documentation

## 2023-01-12 NOTE — TELEPHONE ENCOUNTER
Patient's brother called in asking for a call back from Dr Freddy Sanz regarding his brother's admission and status

## 2023-01-12 NOTE — PROGRESS NOTES
01/12/23 0830   Pain Assessment   Pain Assessment Tool 0-10   Pain Score No Pain   Restrictions/Precautions   Precautions Fall Risk;Contact/isolation;Multiple lines;Supervision on toilet/commode  (Dillan drain, ileostomy, IR Drain)   Weight Bearing Restrictions No   ROM Restrictions No   Lifestyle   Autonomy "I am so tired"   Eating   Comment NPO w/ upcoming procedure   Shower/Bathe Self   Type of Assistance Needed Physical assistance   Physical Assistance Level 25% or less   Comment Bed level d/t extensive fatigue and overall feeling unwell, although pt reports feeling better compard to yesterday  Set up-Caleb  HOB elevated pt washes face and UB w/ extra time, as well as top half of legs  Lying flat, pt washes groin, assist to wash buttocks thoroughly when pt rolls onto R side  CHG wash utilized where appropriate  Shower/Bathe Self CARE Score 3   Upper Body Dressing   Type of Assistance Needed Physical assistance   Physical Assistance Level 25% or less   Comment Caleb to fully doff [place] shirt w/ HOB elevated in bed, use of bed rails to pull self forward  RN disconnected IV during UB dressing  Assist to don gown d/t upcoming procedure  Upper Body Dressing CARE Score 3   Lower Body Dressing   Type of Assistance Needed Physical assistance   Physical Assistance Level 76% or more   Comment bed level d/t extensive fatigue and overall feeling unwell  Pt reports feeling too tired and weak to attempt sitting EOB at this time  MaxA overall to don/doff elastic pants via bridging   pt able to partially assist w/ CM over hips   Lower Body Dressing CARE Score 2   Lying to Sitting on Side of Bed   Comment d/t extensive fatigue and feeling unwell   Reason if not Attempted Refused to perform   Lying to Sitting on Side of Bed CARE Score 7   Sit to Stand   Comment d/t extensive fatigue and feeling unwell   Reason if not Attempted Refused to perform   Sit to Stand CARE Score 7   Cognition   Overall Cognitive Status [facility] Arousal/Participation Cooperative   Attention Attends with cues to redirect   Orientation Level Oriented X4   Memory Within functional limits   Following Commands Follows one step commands with increased time or repetition   Comments fatigued and flat throughout session   Activity Tolerance   Activity Tolerance Patient limited by fatigue;Treatment limited secondary to medical complications (Comment)   Medical Staff Made Aware RN Lorrie notified of ileostomy bag had been leaking, noted dry drainage that was cleaned by pt duirng ADL  Not currently leaking, RN reports will return as soon as therapy is over to assist pt  Assessment   Treatment Assessment OT session focusing on basic ADLs  D/t extensive fatigue and feeling unwell, as well as NPO w/ upcoming procedure, ADLs completed bed level at this time  Pt currently is not a medhold and agreeable to bed level ADLs  D/t modified task completion and fatigue pt required additional assist today compared to recent ADLs  Anticipate w/ ample time and improved energy level pt would perform at typical sup-Caleb  Session dc'd following ADL d/t pt fatigue and feeling unwell, pt wishing to rest at this time and would like to reattempt therapy following procedure if medically cleared to do so  Pt states, "I had to refuse yesterday and I don't want to get in the habit of that "   Problem List Decreased strength;Decreased range of motion;Decreased endurance; Impaired balance;Decreased mobility; Decreased skin integrity   Plan   Treatment/Interventions ADL retraining;Functional transfer training; Therapeutic exercise; Endurance training;Patient/family training;Equipment eval/education; Compensatory technique education;Continued evaluation;Spoke to nursing   Progress Slow progress, decreased activity tolerance   OT Therapy Minutes   OT Time In 0830   OT Time Out 0925   OT Total Time (minutes) 55   OT Mode of treatment - Individual (minutes) 55   OT Mode of treatment - Concurrent (minutes) 0   OT Mode of treatment - Group (minutes) 0   OT Mode of treatment - Co-treat (minutes) 0   OT Mode of Treatment - Total time(minutes) 55 minutes   OT Cumulative Minutes 1838   Therapy Time missed   Time missed?  Yes   Amount of time missed 35   Reason for time missed Extreme fatigue   Time(s) multiple attempts made   (OT to reattempt this day as schedule permits)

## 2023-01-12 NOTE — SEDATION DOCUMENTATION
Abdominal Drains checked x 3  The #2 drain was exchanged with 40 ml thick purulent drainage aspirated  VSS  Denies new pain at this time  Pain was 7 during tube exchange  Transported back to [place] [id number] via stretcher

## 2023-01-13 LAB
BASOPHILS # BLD AUTO: 0.03 THOUSANDS/ÂΜL (ref 0–0.1)
BASOPHILS NFR BLD AUTO: 0 % (ref 0–1)
DME PARACHUTE DELIVERY DATE ACTUAL: NORMAL
DME PARACHUTE DELIVERY DATE REQUESTED: NORMAL
DME PARACHUTE ITEM DESCRIPTION: NORMAL
DME PARACHUTE ORDER STATUS: NORMAL
DME PARACHUTE SUPPLIER NAME: NORMAL
DME PARACHUTE SUPPLIER PHONE: NORMAL
EOSINOPHIL # BLD AUTO: 0 THOUSAND/ÂΜL (ref 0–0.61)
EOSINOPHIL NFR BLD AUTO: 0 % (ref 0–6)
ERYTHROCYTE [DISTWIDTH] IN BLOOD BY AUTOMATED COUNT: 17.5 % (ref 11.6–15.1)
GLUCOSE SERPL-MCNC: 113 MG/DL (ref 65–140)
GLUCOSE SERPL-MCNC: 153 MG/DL (ref 65–140)
GLUCOSE SERPL-MCNC: 166 MG/DL (ref 65–140)
GLUCOSE SERPL-MCNC: 222 MG/DL (ref 65–140)
HCT VFR BLD AUTO: 23.7 % (ref 36.5–49.3)
HGB BLD-MCNC: 7.4 G/DL (ref 12–17)
IMM GRANULOCYTES # BLD AUTO: 0.12 THOUSAND/UL (ref 0–0.2)
IMM GRANULOCYTES NFR BLD AUTO: 1 % (ref 0–2)
LYMPHOCYTES # BLD AUTO: 1.51 THOUSANDS/ÂΜL (ref 0.6–4.47)
LYMPHOCYTES NFR BLD AUTO: 14 % (ref 14–44)
MCH RBC QN AUTO: 26.2 PG (ref 26.8–34.3)
MCHC RBC AUTO-ENTMCNC: 31.2 G/DL (ref 31.4–37.4)
MCV RBC AUTO: 84 FL (ref 82–98)
MONOCYTES # BLD AUTO: 1.09 THOUSAND/ÂΜL (ref 0.17–1.22)
MONOCYTES NFR BLD AUTO: 10 % (ref 4–12)
NEUTROPHILS # BLD AUTO: 8.19 THOUSANDS/ÂΜL (ref 1.85–7.62)
NEUTS SEG NFR BLD AUTO: 75 % (ref 43–75)
NRBC BLD AUTO-RTO: 0 /100 WBCS
PLATELET # BLD AUTO: 362 THOUSANDS/UL (ref 149–390)
PMV BLD AUTO: 10.4 FL (ref 8.9–12.7)
RBC # BLD AUTO: 2.82 MILLION/UL (ref 3.88–5.62)
WBC # BLD AUTO: 10.94 THOUSAND/UL (ref 4.31–10.16)

## 2023-01-13 RX ADMIN — SODIUM CHLORIDE 75 ML/HR: 0.9 INJECTION, SOLUTION INTRAVENOUS at 10:00

## 2023-01-13 RX ADMIN — HEPARIN SODIUM 5000 UNITS: 5000 INJECTION INTRAVENOUS; SUBCUTANEOUS at 05:14

## 2023-01-13 RX ADMIN — METHOCARBAMOL 500 MG: 500 TABLET ORAL at 08:23

## 2023-01-13 RX ADMIN — ACETAMINOPHEN 975 MG: 325 TABLET, FILM COATED ORAL at 14:18

## 2023-01-13 RX ADMIN — INSULIN LISPRO 1 UNITS: 100 INJECTION, SOLUTION INTRAVENOUS; SUBCUTANEOUS at 22:08

## 2023-01-13 RX ADMIN — TAMSULOSIN HYDROCHLORIDE 0.4 MG: 0.4 CAPSULE ORAL at 18:22

## 2023-01-13 RX ADMIN — PANTOPRAZOLE SODIUM 40 MG: 40 TABLET, DELAYED RELEASE ORAL at 18:22

## 2023-01-13 RX ADMIN — ACETAMINOPHEN 975 MG: 325 TABLET, FILM COATED ORAL at 22:10

## 2023-01-13 RX ADMIN — INSULIN LISPRO 4 UNITS: 100 INJECTION, SOLUTION INTRAVENOUS; SUBCUTANEOUS at 18:19

## 2023-01-13 RX ADMIN — ACETAMINOPHEN 975 MG: 325 TABLET, FILM COATED ORAL at 05:13

## 2023-01-13 RX ADMIN — INSULIN LISPRO 2 UNITS: 100 INJECTION, SOLUTION INTRAVENOUS; SUBCUTANEOUS at 11:38

## 2023-01-13 RX ADMIN — INSULIN LISPRO 4 UNITS: 100 INJECTION, SOLUTION INTRAVENOUS; SUBCUTANEOUS at 08:25

## 2023-01-13 RX ADMIN — SIMETHICONE 80 MG: 80 TABLET, CHEWABLE ORAL at 22:06

## 2023-01-13 RX ADMIN — METHOCARBAMOL 500 MG: 500 TABLET ORAL at 18:18

## 2023-01-13 RX ADMIN — HEPARIN SODIUM 5000 UNITS: 5000 INJECTION INTRAVENOUS; SUBCUTANEOUS at 14:18

## 2023-01-13 RX ADMIN — INSULIN GLARGINE 10 UNITS: 100 INJECTION, SOLUTION SUBCUTANEOUS at 22:07

## 2023-01-13 RX ADMIN — Medication 1 TABLET: at 08:23

## 2023-01-13 RX ADMIN — SIMETHICONE 80 MG: 80 TABLET, CHEWABLE ORAL at 18:17

## 2023-01-13 RX ADMIN — OXYCODONE HYDROCHLORIDE 5 MG: 5 TABLET ORAL at 09:54

## 2023-01-13 RX ADMIN — MELATONIN TAB 3 MG 3 MG: 3 TAB at 22:06

## 2023-01-13 RX ADMIN — SIMETHICONE 80 MG: 80 TABLET, CHEWABLE ORAL at 11:38

## 2023-01-13 RX ADMIN — ATORVASTATIN CALCIUM 40 MG: 40 TABLET, FILM COATED ORAL at 08:23

## 2023-01-13 RX ADMIN — ERTAPENEM SODIUM 1000 MG: 1 INJECTION, POWDER, LYOPHILIZED, FOR SOLUTION INTRAMUSCULAR; INTRAVENOUS at 22:30

## 2023-01-13 RX ADMIN — CHOLECALCIFEROL TAB 10 MCG (400 UNIT) 400 UNITS: 10 TAB at 08:24

## 2023-01-13 RX ADMIN — ASPIRIN 81 MG CHEWABLE TABLET 81 MG: 81 TABLET CHEWABLE at 08:23

## 2023-01-13 RX ADMIN — INSULIN LISPRO 1 UNITS: 100 INJECTION, SOLUTION INTRAVENOUS; SUBCUTANEOUS at 08:25

## 2023-01-13 RX ADMIN — SIMETHICONE 80 MG: 80 TABLET, CHEWABLE ORAL at 08:24

## 2023-01-13 RX ADMIN — AMLODIPINE BESYLATE 5 MG: 5 TABLET ORAL at 08:24

## 2023-01-13 RX ADMIN — COLLAGENASE SANTYL: 250 OINTMENT TOPICAL at 08:27

## 2023-01-13 RX ADMIN — INSULIN LISPRO 1 UNITS: 100 INJECTION, SOLUTION INTRAVENOUS; SUBCUTANEOUS at 18:19

## 2023-01-13 RX ADMIN — OXYCODONE HYDROCHLORIDE 10 MG: 10 TABLET ORAL at 22:10

## 2023-01-13 RX ADMIN — AMLODIPINE BESYLATE 5 MG: 5 TABLET ORAL at 18:17

## 2023-01-13 RX ADMIN — HEPARIN SODIUM 5000 UNITS: 5000 INJECTION INTRAVENOUS; SUBCUTANEOUS at 22:06

## 2023-01-13 RX ADMIN — PANTOPRAZOLE SODIUM 40 MG: 40 TABLET, DELAYED RELEASE ORAL at 06:09

## 2023-01-13 RX ADMIN — OXYCODONE HYDROCHLORIDE 10 MG: 10 TABLET ORAL at 14:24

## 2023-01-13 NOTE — PROGRESS NOTES
01/11/23 0830   Assessment   Treatment Assessment 90 minutes missed this AM due to pt NPO and awaiting CT Scan  will attempt to see patient as able; pt is 900 minutes  Therapy Time missed   Time missed?  Yes   Amount of time missed 90   Reason for time missed Medical procedure Patient : Jun Brink Age: 78 year old Sex: male   MRN: [id number] Encounter Date: 1/16/2020    Y03/Y03  History     Chief Complaint   Patient presents with   • Abnormal Lab Results     History may be limited by patient current status of reported confusion by [place] and family.    HPI     1/16/2020  12:55 PM Jun Brink is a 78 year old male with history of Parkinson's Disease, PE on Warfarin who presents via EMS to the Emergency Department for Hyponatremia today. Denies fever,chills, chest pain, SOB, abd pain, nausea, emesis, diarrhea, back pain, urinary sx, focal weakness or numbness, headache, dizziness or lightheadedness.     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 Follows up with Dr. Norris.On 12/31/19, patient was seen over at Dr. Smooth Bob for mental status changes, was confused and angry, and was urged to come to the ED but patient refused and agreed to try Sertraline 50 mg a day. They faxed information to the [facility].On 1/10/20-Dr. Omer there is a telephone note recommending blood work including BMP,CBC and a UA. He recommended the patient to decrease Carbidopa/Levodopa to 1 tablet TID.Today 1/16/20, patient was seen by Dr. Omer for abnormal lab results from 1/15/20 with a Sodium of 123 and an UA with mild WBC,Blood 1+, 3+Leukocyte Esterase trace.Today, Dr. Omer spoke to the ED regarding patient presenting for medical treatment. He recommends hospitalization for correction of Sodium. Previous history of Kidney stones.      PCP: Smooth Bob MD    Allergies   Allergen Reactions   • Bee Sting SWELLING   • Clindamycin RASH     rash   • Penicillin G HIVES     hives   • Sulfa Antibiotics RASH     rash       Current Discharge Medication List      Prior to Admission Medications    Details   carbidopa-levodopa (SINEMET)  MG per tablet  TAKE 1 TABLET BY MOUTH THREE TIMES A DAY  Qty: 90 tablet, Refills: 2      desmopressin (DDAVP NASAL) 0.01 % nasal spray Spray 1 spray in each nostril nightly.  Qty: 5 mL, Refills: 6      DISPENSE Patient is to take 6mg, INR recheck 4wks on 02/05/2020. Please fax INR result to [phone]  Qty: 1 each, Refills: 0      DISPENSE Patient is to take 6mg daily, INR rechek 2wks 12/11/19. Please fax INR results to [phone]  Qty: 1 each, Refills: 0      acetaminophen (TYLENOL) 325 MG tablet TAKE 2 TABLETS (625MG) BY MOUTH EVERY FOUR HOURS AS NEEDED FOR PAIN FOR FEVER *MAX DOSE OF 4000MG OF APAP PER 24 HOURS FROM ALL SOURCES*  Qty: 60 tablet, Refills: 5      latanoprost (XALATAN) 0.005 % ophthalmic solution INSTILL 1 DROP IN BOTH EYES AT BEDTIME FOR GLAUCOMA  Qty: 2.5 mL, Refills: 11      Multiple Vitamin (THEREMS) Tab TAKE 2 TABLETS BY MOUTH TWICE A DAY  Qty: 122 tablet, Refills: 11      warfarin (COUMADIN) 6 MG tablet Or as directed by MD .      lisinopril (ZESTRIL) 2.5 MG tablet Take 2.5 mg by mouth daily.      trazodone (DESYREL) 150 MG tablet Take 1 tablet by mouth nightly.  Qty: 30 tablet, Refills: 5      tamsulosin (FLOMAX) 0.4 MG Cap Take 2 capsules by mouth every evening.  Qty: 60 capsule, Refills: 11      potassium chloride (K-TAB) 20 MEQ ER tablet TAKE 1 TABLET BY MOUTH DAILY -SUPPLEMENT  Qty: 30 tablet, Refills: 11             Current Discharge Medication List          Past Medical History:   Diagnosis Date   • Apnea     on CPAP @5   • Gastroesophageal reflux disease    • History of pulmonary embolism     8-12-19 submassive after stroke, 2011 car ride, 2005   • History of spontaneous intraparenchymal intracranial hemorrhage associated with hypertension 07/15/2019    on warfarin   1.6 cm R thalamic   • Insomnia, unspecified    • Kidney stones     passed on own   • Lupus anticoagulant positive     single value possibly on heparin drip marisol 8-13-19 at 1607 90 day f/up pending   • Open angle with borderline  findings, low risk    • Other and unspecified hyperlipidemia    • Other diseases of lung, not elsewhere classified     Blood Clots   • Reflux esophagitis    • Sleep apnea        Past Surgical History:   Procedure Laterality Date   • COLONOSCOPY DIAGNOSTIC      Colonoscopy   • COLONOSCOPY DIAGNOSTIC  2012    [facility] recall    • CYSTOSCOPY,INSERT URETERAL STENT  5/3/13    Dr. Rosas[facility]   • CYSTOURETHROSCOPY /LITHOTRIPSY  5/3/13   • PAST SURGICAL HISTORY      surgery for apnea       Family History   Problem Relation Age of Onset   • Ophthalmology Mother         glaucoma   • Ophthalmology Father         glaucoma   • Parkinsonism Brother    • Dementia/Alzheimers Sister    • Genitourinary Neg Hx        Social History     Tobacco Use   • Smoking status: Former Smoker     Last attempt to quit: 10/1/1979     Years since quittin.3   • Smokeless tobacco: Never Used   • Tobacco comment: many years ago   Substance Use Topics   • Alcohol use: Yes     Alcohol/week: 0.8 standard drinks     Types: 1 Standard drinks or equivalent per week     Comment: social   • Drug use: No       Review of Systems   Constitutional: Negative for chills, diaphoresis, fatigue, fever and unexpected weight change.   HENT: Negative for ear pain and sore throat.    Eyes: Negative for pain and visual disturbance.   Respiratory: Negative for cough and shortness of breath.    Cardiovascular: Negative for chest pain.   Gastrointestinal: Negative for abdominal pain, constipation, diarrhea, nausea and vomiting.   Genitourinary: Negative for difficulty urinating and dysuria.   Musculoskeletal: Negative for neck pain and neck stiffness.   Skin: Negative for color change and rash.   Neurological: Positive for tremors (baseline, hx of Parkinson's). Negative for dizziness, light-headedness and headaches.       Physical Exam     ED Triage Vitals [20 1201]   ED Triage Vitals Group      Temp 98.4 °F (36.9 °C)      Pulse 82      Resp 18       /84      SpO2 95 %      EtCO2 mmHg       Height 5' 7\" (1.702 m)      Weight       Weight Scale Used       BMI (Calculated)       IBW/kg (Calculated) 66.1       Physical Exam   Constitutional: He is oriented to person, place, and time. Vital signs are normal. He appears well-developed and well-nourished.  Non-toxic appearance. He does not appear ill. No distress.   HENT:   Head: Normocephalic and atraumatic.   Eyes: Pupils are equal, round, and reactive to light. Conjunctivae and EOM are normal. No scleral icterus.   Neck: Normal range of motion. Neck supple.   Cardiovascular: Normal rate, regular rhythm, S1 normal, S2 normal, normal heart sounds and intact distal pulses.   No murmur heard.  Pulses:       Radial pulses are 2+ on the right side and 2+ on the left side.        Dorsalis pedis pulses are 2+ on the right side and 2+ on the left side.   Pulmonary/Chest: Effort normal and breath sounds normal. No respiratory distress. He has no wheezes.   Abdominal: Soft. Normal appearance and bowel sounds are normal. He exhibits no distension and no mass. There is no tenderness. There is no guarding. Musculoskeletal: Normal range of motion.         General: No edema.     Neurological: He is alert and oriented to person, place, and time. He displays tremor. He exhibits normal muscle tone. GCS eye subscore is 4. GCS verbal subscore is 5. GCS motor subscore is 6.   Mild tremors of the lateral hands with holding and extending position in front of him.  Face symmetric.  Moves all extremities.  Nl sensation throughout all extremities.    No aphasia, no dysarthria.   Normal visual fields to confrontation.   Skin: Skin is warm and dry. No rash noted. He is not diaphoretic.   Cap refill is less than 2 seconds.    Psychiatric: He has a normal mood and affect. His speech is normal and behavior is normal.   Nursing note and vitals reviewed.      ED Course     Procedures    Lab Results     Results for orders placed or performed  during the hospital encounter of 01/16/20   COMPREHENSIVE METABOLIC PANEL   Result Value Ref Range    Sodium 134 (L) 135 - 145 mmol/L    Potassium 4.5 3.4 - 5.1 mmol/L    Chloride 101 98 - 107 mmol/L    Carbon Dioxide 28 21 - 32 mmol/L    Anion Gap 10 10 - 20 mmol/L    Glucose 85 65 - 99 mg/dL    BUN 20 6 - 20 mg/dL    Creatinine 0.75 0.67 - 1.17 mg/dL    GFR Estimate,  >90     GFR Estimate, Non African American 87     BUN/Creatinine Ratio 27 (H) 7 - 25    CALCIUM 9.0 8.4 - 10.2 mg/dL    TOTAL BILIRUBIN 0.3 0.2 - 1.0 mg/dL    AST/SGOT 16 <38 Units/L    ALT/SGPT 18 <64 Units/L    ALK PHOSPHATASE 64 45 - 117 Units/L    TOTAL PROTEIN 7.3 6.4 - 8.2 g/dL    Albumin 3.6 3.6 - 5.1 g/dL    GLOBULIN 3.7 2.0 - 4.0 g/dL    A/G Ratio, Serum 1.0 1.0 - 2.4   Magnesium   Result Value Ref Range    MAGNESIUM 2.5 (H) 1.7 - 2.4 mg/dL   Prothrombin Time   Result Value Ref Range    PROTIME 25.2 (H) 9.7 - 11.8 sec    INR 2.5    Chem 8 Panel - Point of Care   Result Value Ref Range    Sodium  135 - 145 mmol/L    Potassium POC 4.9 3.4 - 5.1 mmol/L    Chloride POC 99 98 - 107 mmol/L    CALCIUM IONIZED-POC 1.15 1.15 - 1.29 mmol/L    CO2 Total 29 (H) 19 - 24 mmol/L    GLUCOSE POC 90 70 - 99 mg/dL    BUN POC 20 6 - 20 mg/dL    HEMATOCRIT POC 48.0 39.0 - 51.0 %    Hemoglobin POC 16.3 13.0 - 17.0 g/dL    ANION GAP POC 13 mmol/L    Creatinine POC 0.80 0.67 - 1.17 mg/dL    Estimated GFR  (POC) >90     Estimated GFR Non- (POC) 85    CBC & Auto Differential   Result Value Ref Range    WBC 8.2 4.2 - 11.0 K/mcL    RBC 5.04 4.50 - 5.90 mil/mcL    HGB 14.9 13.0 - 17.0 g/dL    HCT 47.3 39.0 - 51.0 %    MCV 93.8 78.0 - 100.0 fl    MCH 29.6 26.0 - 34.0 pg    MCHC 31.5 (L) 32.0 - 36.5 g/dL    RDW-CV 14.5 11.0 - 15.0 %     140 - 450 K/mcL    NRBC 0 0 /100 WBC    DIFF TYPE AUTOMATED DIFFERENTIAL     Neutrophil 67 %    LYMPH 17 %    MONO 14 %    EOSIN 1 %    BASO 1 %    Percent Immature Granuloctyes  0 %    Absolute Neutrophil 5.5 1.8 - 7.7 K/mcL    Absolute Lymph 1.4 1.0 - 4.0 K/mcL    Absolute Mono 1.1 (H) 0.3 - 0.9 K/mcL    Absolute Eos 0.0 (L) 0.1 - 0.5 K/mcL    Absolute Baso 0.1 0.0 - 0.3 K/mcL    Absolute Immature Granulocytes 0.0 0 - 0.2 K/mcl   URINALYSIS & REFLEX MICRO WITH CULTURE IF INDICATED   Result Value Ref Range    COLOR YELLOW YELLOW    APPEARANCE CLOUDY     GLUCOSE(URINE) NEGATIVE NEGATIVE mg/dL    BILIRUBIN NEGATIVE NEGATIVE    KETONES TRACE (A) NEGATIVE mg/dL    SPECIFIC GRAVITY 1.021 1.005 - 1.030    BLOOD NEGATIVE NEGATIVE    pH 8.0 (H) 5.0 - 7.0 Units    PROTEIN(URINE) NEGATIVE NEGATIVE mg/dL    UROBILINOGEN 0.2 0.0 - 1.0 mg/dL    NITRITE NEGATIVE NEGATIVE    LEUKOCYTE ESTERASE MODERATE (A) NEGATIVE    SPECIMEN TYPE URINE CLEAN CATCH     RBC 3 to 5 0 - 2 /hpf    WBC 6 to 10 0 - 5 /hpf    BACTERIA MODERATE (A) NONE SEEN /hpf    Amorphous Material PRESENT        Radiology Results     Imaging Results          CT HEAD WO CONTRAST (Final result)  Result time 01/16/20 18:06:33    Final result                 Impression:    IMPRESSION:     1. No acute intracranial process, bleed or mass.  2. Atrophy and chronic microvascular ischemic type white matter changes.  3. Small area of lacunar change in the left thalamus at site of previous  intrasplenic hemorrhage on the left..               Narrative:    CT HEAD WO CONTRAST    INDICATION:  confusion, change in behavior, hx of intraparenchymal  hemorrhage in July of 2019    COMPARISON:  CT brain dated July 2019.    FINDINGS:   The ventricles and cortical sulci are enlarged consistent with some  atrophic changes. Additionally there are low-attenuation changes in the  periventricular white matter and centrum semiovale consistent with some  chronic white matter microvascular ischemic type white matter changes.  There is no mass or midline shift or mass effect. No intracranial  hemorrhage is seen.    There is no posterior fossa mass or mass effect.  Craniocervical junction is  normal.    There are no abnormal extra-axial masses or abnormal fluid collections.    Gray-white interface the brain parenchyma is preserved. There is no focal  gyral swelling or geographic brain swelling identified to indicate brain  edema.    There is a small lucency identified in the left thalamus at site of  previous intraparenchymal hemorrhage seen on prior CT.    The visualized paranasal sinuses demonstrate opacification of a few left  ethmoid air cells with enlargement of the sinus air cells raising question  of polypoid disease. There is also minimal mucosal thickening inferiorly in  the frontal sinuses. Sphenoid sinuses and maxillary sinuses as well as the  mastoid air cells are normal.    The calvarial vault is intact.                                ED Medication Orders (From admission, onward)    Ordered Start     Status Ordering Provider    01/16/20 1856 01/16/20 1857  cefTRIAXone (ROCEPHIN) syringe 1,000 mg  ONCE      Last MAR action:  Given BRYSON ARNOLD  Vitals  Vitals:    01/16/20 2030 01/16/20 2100 01/16/20 2110 01/16/20 2113   BP: 176/82  161/88 155/81   Pulse: 68 69 72 69   Resp: 16 16 16 16   Temp:       TempSrc:       SpO2: 95% 94% 96% 94%   Height:           ED Course  Jun Brink is a 78 year old male with history of PE on Warfarin, and intraparenchymal hemorrhage who presents for Hyponatremia. Vital signs were WNL. On initial examination, patient appears well and in no acute distress, lungs are clear to auscultation. We have discussed the plan for basic blood work and UA. All questions were answered at this time. Patient has expressed understanding and agreement of this plan.     2:00 PM Spoke to SHEYLA Patino over at [facility] in regards to patient's presentation and current lab studies. INR is within normal limits, WBC of 8.2 with HGB of 14.9, Magnesium is slightly elevated, 2.5. Na is 134. She notes lab work was done 1/13/20 and faxed to the  doctors office, with response today, 1/16/20. She notes that his change in behaviors started one month ago. She states that Jun currently makes his own decisions, but daughter Jenny is involved and they will likely move forward with her as POA. No known head trauma.    3:24 PM Patient's nurse informed me that the patient would like something to eat. Nurse reports that patient has not provided an urine sample and reported that he has not urinated since yesterday.    3:45 PM Patient's nurse informed me that the patient urinated in bed. Post-void residual volume is 260 cc's.    3:50 PM Patient's nurse informed me that patient was not aware that he urinated in bed. Nurse further reports the patient does not want a catheter or a straight cath. Patient has hx of kidney stones, will order CT Abdomen/Pelvis scan.    4:02 PM Patient's nurse informed me that after talking to the patient's daughter over the phone, who reported patient has had acute confusion since July of last year as well as urinary incontinence. Further evaluation of urology note indicates that patient has had ongoing incontinence. Will cancel CT kidney stones for now.    4:07 PM Discussed current ED findings with the patient. Patient reports that he has had urinary incontinence for awhile and currently denies new or worsening sx. He has previously followed up with Urologist, Dr. Ybarra. Patient denies previous history of self-catheterization and reports he does not like catheters. He verbalizes, \" If I cannot pee, how would you get urine from me\". Discussed the need for obtaining an urine sample by using a temporary urinary catheter for a straight cath. Patient is adamantly declining a urinary catheter or attempt at obtaining urine. Discussed that I will explain his presentation with Dr. Omer. If he requires further care beyond the ED, a urine sample will be collected at some point early in the stay as to keep him safe and truly evaluate for UTI. The  patient verbalizes, \"well if the doctor needs one I guess I will do it but I cannot urinate so I don't know how they are going to get urine from me\" All questions addressed. .    4:15 PM Paging Dr. Myrna Omer of Neurology.    4:19 PM I spoke with Dr. Perrin on call for Dr. Omer, regarding the patient's presentation and the ED work up. Discussed previous visits with Dr. Omer. Dr. Perrin states that he will discuss this with Dr. Omer and someone will call back.     4:29 PM I spoke with Dr. Omer regarding the patient's presentation and the ED work up. Dr. Omer would like patient to be hospitalized. Dr. Omer requests CT Head. He is aware that a urine has not been collected as patient is refusing a straight cath, but that there was concern on the urine dip from the facility. This will be followed.    4:31 PM Paged Hospitalist.    4:36 PM I spoke with  regarding the patient's presentation, and the ED work up. Dr. Griffiths agrees with the plan of care. He is aware that a urine has not been collected as patient is refusing a straight cath, but that there was concern on the urine dip from the facility. This will be followed.    4:40 PM Discussed the plan of care with the patient. Patient understands and agrees with the plan.     Patient care signed out to Carlitos Thakkar PA-C at the end of my shift. Sign-out included relevant details of history, physical, and work-up to date. Will follow up on urinalysis if provided and head CT prior to patient going to the floor.      [facility]  Critical Care time spent on this patient outside of billable procedures: No.     4:39 PM Does this patient meet Severe Sepsis criteria by CMS SEP-1 definition? No.    4:39 PM Does this patient meet Septic Shock criteria by CMS SEP-1 definition? No.     Clinical Impression:  ED Diagnoses        Final diagnoses    Confusion          Urinary incontinence, unspecified type                  Pt to be admitted to Dr. Griffiths  in fair condition.       I have reviewed the information recorded by the scribe for accuracy and agree with its contents.    _______________________________________________  Fe James, acting as a scribe for Yasmin Fowler PA-C  Dictation #[id number]  Scribe: Fe James.     Attending Physician: Dr.Christopher ALDEN Ray MD  Dictation # [id number]         Yasmin Fowler PA-C  01/16/20 5909

## 2023-01-13 NOTE — NURSING NOTE
Pt slept soundly through the night without c/o  Abdominal dressings C/D/I  Right radha  t-tube output 50cc  Dillan drains #2- 20 cc output milky drainage, Dillan drain # 3 and 4 no drainage/output noted  Oncology surgery resident to assess  Instructed to monitor

## 2023-01-13 NOTE — PLAN OF CARE
Problem: Prexisting or High Potential for Compromised Skin Integrity  Goal: Skin integrity is maintained or improved  Description: INTERVENTIONS:  - Identify patients at risk for skin breakdown  - Assess and monitor skin integrity  - Assess and monitor nutrition and hydration status  - Monitor labs   - Assess for incontinence   - Turn and reposition patient  - Assist with mobility/ambulation  - Relieve pressure over bony prominences  - Avoid friction and shearing  - Provide appropriate hygiene as needed including keeping skin clean and dry  - Evaluate need for skin moisturizer/barrier cream  - Collaborate with interdisciplinary team   - Patient/family teaching  - Consider wound care consult   Outcome: Progressing     Problem: Potential for Falls  Goal: Patient will remain free of falls  Description: INTERVENTIONS:  - Educate patient/family on patient safety including physical limitations  - Instruct patient to call for assistance with activity   - Consult OT/PT to assist with strengthening/mobility   - Keep Call bell within reach  - Keep bed low and locked with side rails adjusted as appropriate  - Keep care items and personal belongings within reach  - Initiate and maintain comfort rounds  - Make Fall Risk Sign visible to staff  - Offer Toileting every 2-4 Hours, in advance of need  - Initiate/Maintain bed/chair alarm  - Obtain necessary fall risk management equipment: nonskid footwear   - Apply yellow socks and bracelet for high fall risk patients  - Consider moving patient to room near nurses station  Outcome: Progressing     Problem: PAIN - ADULT  Goal: Verbalizes/displays adequate comfort level or baseline comfort level  Description: Interventions:  - Encourage patient to monitor pain and request assistance  - Assess pain using appropriate pain scale  - Administer analgesics based on type and severity of pain and evaluate response  - Implement non-pharmacological measures as appropriate and evaluate response  - Consider cultural and social influences on pain and pain management  - Notify physician/advanced practitioner if interventions unsuccessful or patient reports new pain  Outcome: Progressing     Problem: INFECTION - ADULT  Goal: Absence or prevention of progression during hospitalization  Description: INTERVENTIONS:  - Assess and monitor for signs and symptoms of infection  - Monitor lab/diagnostic results  - Monitor all insertion sites, i e  indwelling lines, tubes, and drains  - Monitor endotracheal if appropriate and nasal secretions for changes in amount and color  - [place] appropriate cooling/warming therapies per order  - Administer medications as ordered  - Instruct and encourage patient and family to use good hand hygiene technique  - Identify and instruct in appropriate isolation precautions for identified infection/condition  Outcome: Progressing     Problem: SAFETY ADULT  Goal: Patient will remain free of falls  Description: INTERVENTIONS:  - Educate patient/family on patient safety including physical limitations  - Instruct patient to call for assistance with activity   - Consult OT/PT to assist with strengthening/mobility   - Keep Call bell within reach  - Keep bed low and locked with side rails adjusted as appropriate  - Keep care items and personal belongings within reach  - Initiate and maintain comfort rounds  - Make Fall Risk Sign visible to staff  - Offer Toileting every 2-4 Hours, in advance of need  - Initiate/Maintain bed/chair alarm  - Obtain necessary fall risk management equipment: nonskid footwear  - Apply yellow socks and bracelet for high fall risk patients  - Consider moving patient to room near nurses station  Outcome: Progressing  Goal: Maintain or return to baseline ADL function  Description: INTERVENTIONS:  -  Assess patient's ability to carry out ADLs; assess patient's baseline for ADL function and identify physical deficits which impact ability to perform ADLs (bathing, care of mouth/teeth, toileting, grooming, dressing, etc )  - Assess/evaluate cause of self-care deficits   - Assess range of motion  - Assess patient's mobility; develop plan if impaired  - Assess patient's need for assistive devices and provide as appropriate  - Encourage maximum independence but intervene and supervise when necessary  - Involve family in performance of ADLs  - Assess for home care needs following discharge   - Consider OT consult to assist with ADL evaluation and planning for discharge  - Provide patient education as appropriate  Outcome: Progressing  Goal: Maintains/Returns to pre admission functional level  Description: INTERVENTIONS:  - Perform BMAT or MOVE assessment daily    - Set and communicate daily mobility goal to care team and patient/family/caregiver  - Collaborate with rehabilitation services on mobility goals if consulted  - Out of bed for toileting  - Record patient progress and toleration of activity level   Outcome: Progressing     Problem: DISCHARGE PLANNING  Goal: Discharge to home or other facility with appropriate resources  Description: INTERVENTIONS:  - Identify barriers to discharge w/patient and caregiver  - Arrange for needed discharge resources and transportation as appropriate  - Identify discharge learning needs (meds, wound care, etc )  - Arrange for interpretive services to assist at discharge as needed  - Refer to Case Management Department for coordinating discharge planning if the patient needs post-hospital services based on physician/advanced practitioner order or complex needs related to functional status, cognitive ability, or social support system  Outcome: Progressing     Problem: Nutrition/Hydration-ADULT  Goal: Nutrient/Hydration intake appropriate for improving, restoring or maintaining nutritional needs  Description: Monitor and assess patient's nutrition/hydration status for malnutrition   Collaborate with interdisciplinary team and initiate plan and interventions as ordered  Monitor patient's weight and dietary intake as ordered or per policy  Utilize nutrition screening tool and intervene as necessary  Determine patient's food preferences and provide high-protein, high-caloric foods as appropriate       INTERVENTIONS:  - Monitor oral intake, urinary output, labs, and treatment plans  - Assess nutrition and hydration status and recommend course of action  - Evaluate amount of meals eaten  - Assist patient with eating if necessary   - Allow adequate time for meals  - Recommend/ encourage appropriate diets, oral nutritional supplements, and vitamin/mineral supplements  - Order, calculate, and assess calorie counts as needed  - Recommend, monitor, and adjust tube feedings and TPN/PPN based on assessed needs  - Assess need for intravenous fluids  - Provide specific nutrition/hydration education as appropriate  - Include patient/family/caregiver in decisions related to nutrition  Outcome: Progressing     Problem: MOBILITY - ADULT  Goal: Maintain or return to baseline ADL function  Description: INTERVENTIONS:  -  Assess patient's ability to carry out ADLs; assess patient's baseline for ADL function and identify physical deficits which impact ability to perform ADLs (bathing, care of mouth/teeth, toileting, grooming, dressing, etc )  - Assess/evaluate cause of self-care deficits   - Assess range of motion  - Assess patient's mobility; develop plan if impaired  - Assess patient's need for assistive devices and provide as appropriate  - Encourage maximum independence but intervene and supervise when necessary  - Involve family in performance of ADLs  - Assess for home care needs following discharge   - Consider OT consult to assist with ADL evaluation and planning for discharge  - Provide patient education as appropriate  Outcome: Progressing  Goal: Maintains/Returns to pre admission functional level  Description: INTERVENTIONS:  - Perform BMAT or MOVE assessment daily    - Set and communicate daily mobility goal to care team and patient/family/caregiver  - Collaborate with rehabilitation services on mobility goals if consulted  - Perform Range of Motion 3 times a day  - Reposition patient every 2 hours    - Dangle patient 3 times a day  - Stand patient 3 times a day  - Ambulate patient 3 times a day  - Out of bed to chair 3 times a day   - Out of bed for meals 3 times a day  - Out of bed for toileting  - Record patient progress and toleration of activity level   Outcome: Progressing

## 2023-01-13 NOTE — PROGRESS NOTES
Progress Note - Infectious Disease   Romy Weathers 61 y o  male MRN: [id number]  Unit/Bed#: -01 Encounter: [id number]      Impression/Plan:    Sepsis     Evolving 12/17:  WBC, HR   Suspect due to persistent left intra-abdominal infection in setting of complex history below   ROS and exam otherwise negative   Recent Flu/RSV/COVID PCR, CXR negative   Patient with noted decreased drain output  Repeat CT AP obtained 1/11 showed a moderate to large left pleural effusion, worsening left hepatic postsurgical fluid collection, resolution of splenic collection with catheter in place  Status post left thoracentesis and drain upsized 1/12/23  Fever curve and leukocytosis improving  Rec:  • Continue Ertapenem for now  • Follow drain output   • Follow-up pleural fluid culture  • Follow temperatures closely  • Recheck CBC in AM  • Supportive care as per the primary service     Intra-abdominal abscess     In the setting complex surgical history as below   Initially developed 11/2022 accompanied by ESBL E  coli bacteremia   Status post exploratory laparotomy, right hemicolectomy, temporary abdominal closure 11/16; re-exploration, partial left colectomy, primary ileocolonic anastomosis with proximal diverting loop ileostomy, midline fascial closure on 11/17   More recently developed abscess in hepatectomy bed, also collection +/- leak at area of prior colonic anastamosis, 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   Status post IR drain into perihepatic collection 12/8   Cultures with ESBL E  Coli   Status post 7 days ertapenem after drain placement through 12/15   Developed recurrent sepsis and repeat CT 12/17 shows hepatic bed collection improved, persistent left intra-abdominal collection   Status post perigastic, perisplenic drains 12/20   Cultures again with ESBL E  Coli   Status post 21 days total antibiotics, completed 1/7   Recurrent fever and leukocytosis as above 1/10 in setting of decreased drain output   CT AP obtained  showed a moderate to large left pleural effusion, worsening left hepatic postsurgical fluid collection, resolution of splenic collection with catheter in place  Status post left thoracentesis and drain upsized 23  Pleural fluid exudative, likely reactive due to underlying intra-abdominal processes  Rec:  • Continue ertapenem as above   • Drain management per surgery and IR  • Anticipate additional 5 days of antibiotics after drain upsizing through , final plan pending clinical course     Possible acute cholecystitis     Status post percutaneous cholecystostomy tube   Alk phos remains markedly elevated, possibly due to drain itself versus known intrahepatic metastases      Metastatic colon cancer   To liver, possible lung   Status post resection, chemotherapy, more recent hepatic resection and ablation, transverse colon resection      CKD   Baseline Cr 1 4       DM      Anemia   Status post multiple transfusions  Above management plan discussed with the patient at bedside  ID will see again 2023  Please call on-call provider with any questions  Antibiotics:  Ertapenem restart day 4    Subjective:  Patient states he is feeling okay today  Continues to have fatigue, as well as pain at the left thoracentesis site and around the intra-abdominal drain  Fever curve and leukocytosis improving  Objective:  Vitals:  Temp:  [97 4 °F (36 3 °C)-99 6 °F (37 6 °C)] 99 6 °F (37 6 °C)  HR:  [] 100  Resp:  [16-18] 18  BP: (105-140)/(62-79) 140/69  SpO2:  [97 %-98 %] 97 %  Temp (24hrs), Av 6 °F (37 °C), Min:97 4 °F (36 3 °C), Max:99 6 °F (37 6 °C)  Current: Temperature: 99 6 °F (37 6 °C)    Physical Exam:   General Appearance:   Debilitated appearing but nontoxic, no acute distress  Throat: Oropharynx moist without lesions      Lungs:   Clear to auscultation bilaterally; no wheezes, rhonchi or rales; respirations unlabored   Heart:  RRR; no murmur, rub or gallop   Abdomen:    3 abdominal drains, right upper quadrant drain with purulent output   Extremities: No clubbing, cyanosis or edema   Skin: No new rashes or lesions  No draining wounds noted  Labs: All pertinent labs and imaging studies were personally reviewed  Results from last 7 days   Lab Units 01/13/23  0530 01/12/23  0452 01/11/23  0555   WBC Thousand/uL 10 94* 12 64* 17 07*   HEMOGLOBIN g/dL 7 4* 7 3* 7 9*   PLATELETS Thousands/uL 362 343 388     Results from last 7 days   Lab Units 01/12/23  0452 01/11/23  0555 01/10/23  0959 01/09/23  0533   SODIUM mmol/L 136 138 134* 136   POTASSIUM mmol/L 3 9 4 4 4 5 4 0   CHLORIDE mmol/L 107 110* 108 109*   CO2 mmol/L 20* 20* 20* 20*   BUN mg/dL 16 20 25 24   CREATININE mg/dL 1 00 1 16 1 44* 1 32*   EGFR ml/min/1 73sq m 82 69 53 59   CALCIUM mg/dL 8 5 8 7 8 5 8 5   AST U/L  --   --   --  45   ALT U/L  --   --   --  13   ALK PHOS U/L  --   --   --  1,120*                       Micro:  Results from last 7 days   Lab Units 01/12/23  1512 01/10/23  2125   BLOOD CULTURE   --  No Growth at 48 hrs  No Growth at 48 hrs  GRAM STAIN RESULT  4+ Polys  No bacteria seen  --    BODY FLUID CULTURE, STERILE  No growth  --        Imaging:  Pertinent imaging in PACS personally reviewed

## 2023-01-13 NOTE — CASE MANAGEMENT
Received fax confirmation of contd stay coverage from claudia valdez at [facility] and update due 1/18

## 2023-01-13 NOTE — PROGRESS NOTES
01/13/23 0700   Pain Assessment   Pain Assessment Tool 0-10   Pain Score 6   Pain Location/Orientation Location: Generalized   Pain Onset/Description Onset: Ongoing;Frequency: Constant/Continuous   Hospital Pain Intervention(s) Repositioned; Emotional support; Rest  (pt reports that he will hold off on pain med until before PT session later this morning)   Restrictions/Precautions   Precautions Fall Risk;Contact/isolation;Multiple lines;Supervision on toilet/commode  (VICTORINO drain, ileo, IR)   Weight Bearing Restrictions No   ROM Restrictions No   Eating   Type of Assistance Needed Independent   Physical Assistance Level No physical assistance   Comment seated upright in bed for breakfast meal at end of OT session   Eating CARE Score 6   Shower/Bathe Self   Type of Assistance Needed Physical assistance;Verbal cues   Physical Assistance Level 26%-50%   Comment Pt engaged in sponge bath bed-level 2* c/o max fatigue and significant pain, post procedure yesterday  Pt able to wash 7/10 parts  Seated upright with HOB elevated, pt able with increased time to wash UB, james area, and upper legs  Required A to wash B/L lower legs/feet 2* pain with dynamic reaching and max fatigue  While pt in sidelying, A to wash rear  Clorhexidine wash utilized where appropriate  Shower/Bathe Self CARE Score 3   Bathing   Assessed Bath Style Sponge Bath   Anticipated D/C Bath Style Sponge Bath   Able to Gather/Transport No   Able to [employer] Temperature No   Able to Wash/Rinse/Dry (body part) Left Arm;Right Arm;L Upper Leg;R Upper Leg;Chest;Abdomen;Perineal Area   Limitations Noted in Balance; Endurance;ROM;Strength;Timeliness   Positioning Seated;Supine   Tub/Shower Transfer   Reason Not Assessed Sponge Bath;Medical   Upper Body Dressing   Type of Assistance Needed Physical assistance   Physical Assistance Level 25% or less   Comment assist to manage down back and manage IV line   Upper Body Dressing CARE Score 3   Lower Body Dressing Type of Assistance Needed Physical assistance;Verbal cues   Physical Assistance Level 76% or more   Comment completed bed-level, 2* extreme fatigue and feeling unwell after procedure yesterday  MaxA to doff/don elastic-waist hospital pants via bridging (total A to thread LEs 2* abdominal pain with dynamic reaching, modA to complete CM over hips)   Lower Body Dressing CARE Score 2   Putting On/Taking Off Footwear   Type of Assistance Needed Physical assistance   Physical Assistance Level Total assistance   Comment 2* max fatigue and pain, pt unable to tolerate sitting EOB to manage footwear, and unable to complete dynamic reach toward feet with HOB elevated 2* abdominal pain with reaching, requiring total A today to manage socks on/off   Putting On/Taking Off Footwear CARE Score 1   Dressing/Undressing Clothing   Remove UB Clothes   (hospital gown)   [place] Lo UB Clothes   (hospital gown)   Remove LB Clothes Pants;Socks   Don LB Clothes Pants;Socks   Limitations Noted In Balance; Endurance;Strength;ROM; Timeliness   Positioning In Bed   Roll Left and Right   Type of Assistance Needed Supervision; Adaptive equipment   Physical Assistance Level No physical assistance   Comment bed rails w/increased time to manage lines and pain w/movement   Roll Left and Right CARE Score 4   Lying to Sitting on Side of Bed   Reason if not Attempted Refused to perform   Lying to Sitting on Side of Bed CARE Score 7   Sit to Stand   Reason if not Attempted Refused to perform   Sit to Stand CARE Score 7   Cognition   Overall Cognitive Status [facility]   Arousal/Participation Cooperative   Attention Attends with cues to redirect   Orientation Level Oriented X4   Memory Within functional limits   Following Commands Follows one step commands with increased time or repetition   Activity Tolerance   Activity Tolerance Patient limited by fatigue;Patient limited by pain   Assessment   Treatment Assessment Pt seen for 90min skilled OT session focused on ADL skills retraining, bed mobility, and providing emotional support, for increased independence w/ADLs/IADLs and decreased caregiver burden  See detailed descriptions of fxl performance above  Pt reported max fatigue, generally feeling unwell, and increased back/abdominal pain since procedure yesterday, so ADL session was completed bed-level with increased time to complete all components  Pt continues to demo emotional lability regarding medical status and requires frequent emotional support and encouragement, as well as gentle redirection, pt receptive  Pt would benefit from continued skilled OT focused on ADL retraining, LHAE education/training, Functional Transfers, Standing tolerance, Standing balance , Fine motor strengthening , Gross motor strengthening , DME training/education, Family training/education, Energy conservation training/education, healthy coping education, Leisure and social pursuits and Core/trunk control/strengthening  Prognosis Fair   Problem List Decreased strength;Decreased range of motion;Decreased endurance; Impaired balance;Decreased mobility;Pain;Decreased skin integrity   Barriers to Discharge Decreased caregiver support   Plan   Treatment/Interventions ADL retraining;Functional transfer training; Therapeutic exercise; Endurance training;Patient/family training;Equipment eval/education; Bed mobility; Compensatory technique education   Progress Slow progress, decreased activity tolerance   Recommendation   OT Discharge Recommendation Home with home health rehabilitation   OT Therapy Minutes   OT Time In 0700   OT Time Out 0830   OT Total Time (minutes) 90   OT Mode of treatment - Individual (minutes) 90   OT Mode of treatment - Concurrent (minutes) 0   OT Mode of treatment - Group (minutes) 0   OT Mode of treatment - Co-treat (minutes) 0   OT Mode of Treatment - Total time(minutes) 90 minutes   OT Cumulative Minutes 1928   Therapy Time missed   Time missed?  No

## 2023-01-13 NOTE — PROGRESS NOTES
Progress Note - Surgical Oncology   Enedina Lama 61 y o  male MRN: [id number]  Unit/Bed#: [facility] 435-67 Encounter: [id number]    Assessment:  61 y o  male status post segment 3 liver resection with transverse colectomy followed by right colectomy for a leak and drainage of multiple abscesses/hematoma    Drain exchange as well as left thoracentesis with IR yesterday 1/12    Vital stable, afebrile  Blood cultures NG x48 hours    UOP 1025  Left thoracentesis 300 cc  Drains total 60 cc  Ileostomy 975    Plan:  -CCD2 diet  -Percutaneous cholecystostomy tube, monitor output and character  -Left-sided drains x3, monitor output and character  -Ileostomy, monitor output and character  -Pain control  -Encourage ambulation  -H  -Care per ARC    Subjective/Objective   Subjective:   Patient reports minimal abdominal pain today, well controlled with pain regimen, did feel full and bloated with barium yesterday so did not eat that much, otherwise denies any nausea or emesis, continues to have ileostomy function, voiding without issues  Objective:     Blood pressure 132/74, pulse 68, temperature 98 9 °F (37 2 °C), temperature source Oral, resp  rate 16, height 5' 10" (1 778 m), weight 85 3 kg (188 lb), SpO2 97 %  ,Body mass index is 26 98 kg/m²        Intake/Output Summary (Last 24 hours) at 1/13/2023 0610  Last data filed at 1/13/2023 0501  Gross per 24 hour   Intake 900 ml   Output 2360 ml   Net -1460 ml       Invasive Devices     Central Venous Catheter Line  Duration           Port A Cath 03/10/22 Right Chest 308 days          Drain  Duration           Ileostomy LUQ 56 days    Cholecystostomy Tube 31 days    Abscess Drain Abdomen 23 days    Abscess Drain Back 23 days    Abscess Drain Abdomen <1 day                Physical Exam:   General: NAD  Skin: Warm, dry, anicteric  HEENT: Normocephalic, atraumatic  CV: RRR, no m/r/g  Pulm: CTA b/l, no inc WOB  Abd: Soft, ND/NT, drains as above, midline wound with dressing present, ileostomy with stool output  MSK: Symmetric, no edema, no tenderness, no deformity  Neuro: AOx3, GCS 15    Lab, Imaging and other studies:I have personally reviewed pertinent lab results    , CBC: No results found for: WBC, HGB, HCT, MCV, PLT, ADJUSTEDWBC, MCH, MCHC, RDW, MPV, NRBC, CMP: No results found for: SODIUM, K, CL, CO2, ANIONGAP, BUN, CREATININE, GLUCOSE, CALCIUM, AST, ALT, ALKPHOS, PROT, BILITOT, EGFR  VTE Pharmacologic Prophylaxis: Sequential compression device (Venodyne)  and Heparin  VTE Mechanical Prophylaxis: sequential compression device

## 2023-01-13 NOTE — PROGRESS NOTES
01/13/23 1030   Pain Assessment   Pain Assessment Tool 0-10   Pain Score 7   Pain Location/Orientation Location: Generalized   Restrictions/Precautions   Precautions Contact/isolation;Multiple lines;Supervision on toilet/commode   Subjective   Subjective pt agreeable to perform skilled PT   Roll Left and Right   Type of Assistance Needed Supervision   Roll Left and Right CARE Score 4   Sit to Lying   Type of Assistance Needed Supervision   Sit to Lying CARE Score 4   Lying to Sitting on Side of Bed   Type of Assistance Needed Supervision   Lying to Sitting on Side of Bed CARE Score 4   Sit to Stand   Type of Assistance Needed Supervision   Sit to Stand CARE Score 4   Bed-Chair Transfer   Type of Assistance Needed Supervision   Comment CS RW   Chair/Bed-to-Chair Transfer CARE Score 4   Transfer Bed/Chair/Wheelchair   Adaptive Equipment Roller Walker   Walk 10 Feet   Type of Assistance Needed Supervision; Adaptive equipment   Comment RW   Walk 10 Feet CARE Score 4   Walk 50 Feet with Two Turns   Type of Assistance Needed Supervision; Adaptive equipment   Comment RW   Walk 50 Feet with Two Turns CARE Score 4   Ambulation   Primary Mode of Locomotion Prior to Admission Walk   Distance Walked (feet) 50 ft   Does the patient walk? 2  Yes   Wheel 50 Feet with Two Turns   Type of Assistance Needed Independent   Wheel 50 Feet with Two Turns CARE Score 6   Wheel 150 Feet   Type of Assistance Needed Independent   Wheel 150 Feet CARE Score 6   Wheelchair mobility   Does the patient use a wheelchair? 1  Yes   Type of Wheelchair Used 1   Manual   Method Left upper extremity   Distance Level Surface (feet) 150 ft   Therapeutic Interventions   Strengthening LAQ AP marching 20 reps gluts 20 reps ball adduction ball , STS x5   Flexibility HS and calfs stretch   Balance standing balance 3 min limited support form RW   Assessment   Treatment Assessment Pt abdminal area in pain d/t tomorrow in IR , pt also able to perform sit to stand to RW Caleb and overall Caleb today d/t pain lvl   Pt able to perform thex ex's for strengthening and ROM   Pt will cont to need skilled PT to meet DC goals back in bed supine with all needs in reach  Cont POC work on more functional mobility and perform carescore over then weekend for possible DC on Mon/Tues/ next week  Barriers to Discharge Decreased caregiver support   Plan   Progress Slow progress, decreased activity tolerance   Recommendation   PT Discharge Recommendation Home with home health rehabilitation   PT Therapy Minutes   PT Time In 1030   PT Time Out 1139   PT Total Time (minutes) 69   PT Mode of treatment - Individual (minutes) 69   PT Mode of treatment - Concurrent (minutes) 0   PT Mode of treatment - Group (minutes) 0   PT Mode of treatment - Co-treat (minutes) 0   PT Mode of Treatment - Total time(minutes) 69 minutes   PT Cumulative Minutes 1922   Therapy Time missed   Time missed?  No

## 2023-01-13 NOTE — PROGRESS NOTES
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  • s/p hepatic resection and reversal of colostomy on 11/7  • s/p right hemicolectomy with partial descending colectomy colocolonic anastomosis with loop ileostomy and mid line abdominal VAC placement 11/16  • WC following  • CT  Abd/pelvis 12/17 = loculated collection along splenic recess   Has perisplenic hilum collection as well --> to IR 12/20 for drain placement in both perigastric and perisplenic area and cx sent from both areas = Ecoli  • Flush drains with 10ml qd; no output recorded for 1/10  • ileostomy 1/10 = 550 ml  • S-O following/Dr Thomas Donohue   • Tube check (incl perc alvina tube) done 1/3 = unchanged with still some fluid around each drain so kept in     • Bloody drainage in VICTORINO #3 and IR in to see pt and felt 2/2 tube manipulation when he went for his tube check = resolved  • On 1/10/23, WBC jumped, febrile to 102 4 --> Ertapenem restarted  CT A/P showed a slightly enlarging perihepatic collection near segment 3 --> #2 drain upsized 1/12/23 and drained 40ml purulent fluid  • Next tube check for @1/26/23 with potential to remove left JPs #3 and #4  • BCs 1/10 = NG x 48 hrs     Acute cholecystitis  • s/p percutaneous tube placement 12/8 with tube check on 12/12, 12/14  • GI saw due to alk phos elevation = felt 2/2 infiltrative disease rather than focal biliary obstruction   AMA was negative     • Alkaline phosphatase isoenzyme sent 12/17 (48% liver/52% bone) = GI  recommended continuing to monitor his LFTs and defer to surgical oncology for further w/u and plan      • TB normalized  • GI wrote note 1/4 = aware labs incl GGT of 1992 (previously 4798 on 12/17)  • Alvina tube = no output recorded for 1/11/23       • Per GI, they wanted cholangiogram with next tube check since wasn't done 1/3 but d/w Dr Marilin Camejo and he did not want done with tube check 1/12/23      Bacteremia/abdominal abscess  • ID following   • s/p Ertapenem was switched to Doxycycline per ID as of 12/30 but had to be switched back 2/2 nausea  • Ertapenem restarted 1/10/23  • WBC up to 17 on 1/11, now down to 10 9     Hypertension  • On Norvasc 5mg BID  • Dose held PM 1/12     Diabetes mellitus  • Home:  Glargine 35U qam/Glyburide-Metformin 5-500 qam/Januvia 100mg qam  • Here:  Lantus 10U qhs/Lispro 4U TID     • If he does not eat = hold Lispro WM dose and just cover with SSI  • Today for lunch since 113, held the Lispro WM  • Has a DEXCOM meter at home  • Continue DM diet and QID Accuchecks/SSI     Acute on chronic renal failure  • Stage III; baseline 1 2-1 4  • Lisinopril currently on hold  • Creat down now from 1 4 to 1 0 with getting IVF   • Will stop IVF     Anemia  • Multifactorial due to recent infection, surgery, CKD stage III  • Transfused on 12/15, 12/16/22 and 12/30  • Hemoglobin 7 4 today 1/13/23 = drop is likely dilutional related from getting IVF = consider transfusion, will d/w Dr Grant Gaitan     Atypical chest pain  • With elevation in troponin/EKG changes on acute side  • Cardiology saw then and cleared pt for discharge  • Did not recommend any further work up  • No further chest pain     Situational depression  • Neuropsychology consulted  • Patient not interested in medications at this point in time     Malnutrition  • Glucerna makes him nauseated  • On 12/15, ordered double protein  • Meal completion is still erratic     Left pleural effusion  • CXR on 12/14 showed small left pleural effusion and was suspect for CHF  • ECHO 11/9/22 = LVEF 55%, unable to assess diastolic function, mild TR  • CT on 1/10 showed mod-lg left pl effusion and had thoracentesis 1/2  • Cyto/labs pending from pleural fluid        Discharge date:  TBA    The above assessment and plan was reviewed and updated as determined by my evaluation of the patient on 1/13/2023      Labs:   Results from last 7 days   Lab Units 01/13/23  0530 01/12/23  0452   WBC Thousand/uL 10 94* 12 64*   HEMOGLOBIN g/dL 7 4* 7 3*   HEMATOCRIT % 23 7* 23 5*   PLATELETS Thousands/uL 362 343     Results from last 7 days   Lab Units 01/12/23  0452 01/11/23  0555   SODIUM mmol/L 136 138   POTASSIUM mmol/L 3 9 4 4   CHLORIDE mmol/L 107 110*   CO2 mmol/L 20* 20*   BUN mg/dL 16 20   CREATININE mg/dL 1 00 1 16   CALCIUM mg/dL 8 5 8 7             Results from last 7 days   Lab Units 01/13/23  1030 01/12/23  2053 01/12/23  1649   POC GLUCOSE mg/dl 222* 180* 134       Review of Scheduled Meds:  Current Facility-Administered Medications   Medication Dose Route Frequency Provider Last Rate   • acetaminophen  325 mg Oral Q4H PRN Jenae Crook MD     • acetaminophen  975 mg Oral Q8H [facility] GAURANG Keith     • amLODIPine  5 mg Oral BID GAURANG Keith     • aspirin  81 mg Oral Daily GAURANG Keith     • atorvastatin  40 mg Oral Daily GAURANG Keith     • calcium carbonate  500 mg Oral BID PRN GAURANG Douglass     • cholecalciferol  400 Units Oral Daily GAURANG Keith     • collagenase   Topical Daily Jenae Crook MD     • ertapenem  1,000 mg Intravenous Q24H Cal Sweeney MD 1,000 mg (01/12/23 2028)   • heparin (porcine)  5,000 Units Subcutaneous Q8H [facility] GAURANG Keith     • insulin glargine  10 Units Subcutaneous HS GAURANG Winston     • insulin lispro  1-5 Units Subcutaneous HS GAURANG Keith     • insulin lispro  1-6 Units Subcutaneous TID AC GAURANG Keith     • insulin lispro  4 Units Subcutaneous TID With Meals GAURANG Winston     • iohexol  35 mL Oral 90 min pre-procedure GAURANG Cee     • melatonin  3 mg Oral HS GAURANG Keith     • methocarbamol  500 mg Oral BID GAURANG Douglass     • multivitamin-minerals  1 tablet Oral Daily Celia Toscano, CRNP     • ondansetron  4 mg Oral Q6H PRN Mal Enriquez, GAURANG     • ondansetron  4 mg Oral BID GAURANG Cardona     • oxyCODONE  10 mg Oral Q6H PRN Mal Enriquez, GAURANG     • oxyCODONE  2 5 mg Oral Q3H PRN Mal Enriquez, CRNP     • oxyCODONE  5 mg Oral Q6H PRN GAURANG Keith     • pantoprazole  40 mg Oral BID AC GAURANG Keith     • simethicone  80 mg Oral 4x Daily (with meals and at bedtime) GAURANG Burton     • sodium chloride  75 mL/hr Intravenous Continuous GAURANG Cardona 75 mL/hr (01/13/23 1000)   • tamsulosin  0 4 mg Oral Daily With Dinner GAURANG Burton         Subjective/ HPI: Patient seen and examined  Patients overnight issues or events were reviewed with nursing or staff during rounds or morning huddle session  New or overnight issues include the following:     No new or overnight issues  Offers no complaints    ROS:   A 10 point ROS was performed; negative except as noted above  Imaging:     IR drainage tube check/change/reposition/reinsertion/upsize   Final Result by Liz Braden MD (01/12 1646)   Impression:   1  Drain check demonstrated all 3 drains to be patent  No fistula to the bowel  There are still small collections around each drain and therefore the drains were left in place except the midline drain #2 was replaced and upsized and did drain    approximately 40cc of purulent fluid  PLAN: Tubes to bag drainage  Return in 2 weeks for tube checks and possible removal of left sided tubes 3 and 4  Workstation performed: [id number]         IR IN-Patient Thoracentesis   Final Result by Liz Braden MD (01/12 7994)   Impression:   1  Successful ultrasound-guided left thoracentesis yielding 300 cc of clear pleural fluid  Workstation performed: [id number]         CT abdomen pelvis w contrast   Final Result by Jen Cassidy MD (01/11 0374)      1  Moderate to large left effusion with adjacent atelectasis  2  Interval worsening left hepatic postsurgical site fluid collection now measuring 4 2 x 3 2 cm  Correlate with output  3  Resolution of subtle splenic collection with catheter in place  Bilobed thick walled fluid collection is smaller inferior to this         4  Percutaneous cholecystostomy tube in place with persistent surrounding inflammatory changes  Unchanged subhepatic collection  5  Tumor involvement involving the right hepatic lobe appears slightly worsened  Workstation performed: [id number]         IR drainage tube check and/or removal   Final Result by Cesar Ford MD (01/04 2461)   Impression:   1  Drain check demonstrated all 3 drains to be patent  No fistula to the bowel  There are still small collections around each drain and therefore the drains were left in place  PLAN: Tubes to bag drainage  Return in 2 weeks for tube checks  Workstation performed: [id number]         IR drainage tube placement   Final Result by Lukas Doyle MD (12/20 2003)      Percutaneous placement of abscess drainage catheters into the perigastric and perisplenic abscesses using 10-[place] citizen catheters  Plan:       - Catheters to bulb suction drainage    - Flush catheters with 10 mL normal saline daily    - Return in 2 weeks for drain check  Workstation performed: [id number]         CT abdomen pelvis w contrast   Final Result by Kaleigh Tavares MD (54/21 1169)      Status post cholecystostomy tube placement with decompression of the gallbladder  In addition, a catheter placed adjacent to the cut edge of the liver is increased position with near complete resolution of fluid collection  Loculated collection along the splenic recess of the lesser sac is unchanged in size  Additional perisplenic collection in the hilum and along the inferior pole are unchanged        Collection of fluid and gas in the left upper quadrant adjacent to left kidney also not significantly changed  No extravasated oral contrast             Workstation performed: [id number]         IR drainage tube check/change/reposition/reinsertion/upsize    (Results Pending)   IR cholecystostomy tube check and/or removal    (Results Pending)       *Labs /Radiology studies reviewed  *Medications reviewed and reconciled as needed  *Please refer to order section for additional ordered labs studies  *Case discussed with primary attending during morning huddle case rounds    Physical Examination:  Vitals:   Vitals:    01/12/23 2141 01/13/23 0146 01/13/23 0513 01/13/23 0824   BP: 134/79 126/73 132/74 126/71   BP Location: Left arm Left arm Left arm    Pulse: 84 74 68    Resp: 16 17 16    Temp: 98 6 °F (37 °C) 98 4 °F (36 9 °C) 98 9 °F (37 2 °C)    TempSrc: Oral Oral Oral    SpO2: 97% 98% 97%    Weight:       Height:           General Appearance: no distress, conversive  HEENT:  External ear normal   Nose normal w/o drainage  Mucous membranes are moist  Oropharynx is clear  Conjunctiva clear w/o icterus or redness  Neck:  Supple, normal ROM  Lungs: BBS without crackles/wheeze/rhonchi; respirations unlabored with normal inspiratory/expiratory effort  No retractions noted  On RA  CV: regular rate and rhythm; no rubs/murmurs/gallops, PMI normal   ABD: Abdomen is soft  Bowel sounds all quadrants  Nontender with no distention  EXT: no edema  Skin: normal turgor, normal texture, no rashes  Psych: affect normal, mood normal  Neuro: AAO      The above physical exam was reviewed and updated as determined by my evaluation of the patient on 1/13/2023      Invasive Devices     Central Venous Catheter Line  Duration           Port A Cath 03/10/22 Right Chest 308 days          Drain  Duration           Ileostomy LUQ 56 days    Cholecystostomy Tube 31 days    Abscess Drain Abdomen 23 days    Abscess Drain Back 23 days    Abscess Drain Abdomen <1 day                   VTE Pharmacologic Prophylaxis: Heparin  Code Status: Level 1 - Full Code  Current Length of Stay: 30 day(s)      Total time spent:  30 minutes with more than 50% spent counseling/coordinating care  Counseling includes discussion with patient re: progress  and discussion with patient of his/her current medical state/information  Coordination of patient's care was performed in conjunction with primary service  Time invested included review of patient's labs, vitals, and management of their comorbidities with continued monitoring  In addition, this patient was discussed with medical team including physician and advanced extenders  The care of the patient was extensively discussed and appropriate treatment plan was formulated unique for this patient  Medical decision making for the day was made by supervising physician unless otherwise noted in their attestation statement  ** Please Note:  voice to text software may have been used in the creation of this document   Although proof errors in transcription or interpretation are a potential of such software**

## 2023-01-14 LAB
ABO GROUP BLD: NORMAL
ANION GAP SERPL CALCULATED.3IONS-SCNC: 7 MMOL/L (ref 4–13)
BASOPHILS # BLD AUTO: 0.04 THOUSANDS/ÂΜL (ref 0–0.1)
BASOPHILS NFR BLD AUTO: 1 % (ref 0–1)
BLD GP AB SCN SERPL QL: NEGATIVE
BUN SERPL-MCNC: 14 MG/DL (ref 5–25)
CALCIUM SERPL-MCNC: 8.4 MG/DL (ref 8.3–10.1)
CHLORIDE SERPL-SCNC: 111 MMOL/L (ref 96–108)
CO2 SERPL-SCNC: 21 MMOL/L (ref 21–32)
CREAT SERPL-MCNC: 1.09 MG/DL (ref 0.6–1.3)
EOSINOPHIL # BLD AUTO: 0 THOUSAND/ÂΜL (ref 0–0.61)
EOSINOPHIL NFR BLD AUTO: 0 % (ref 0–6)
ERYTHROCYTE [DISTWIDTH] IN BLOOD BY AUTOMATED COUNT: 17.5 % (ref 11.6–15.1)
GFR SERPL CREATININE-BSD FRML MDRD: 74 ML/MIN/1.73SQ M
GLUCOSE P FAST SERPL-MCNC: 118 MG/DL (ref 65–99)
GLUCOSE SERPL-MCNC: 118 MG/DL (ref 65–140)
GLUCOSE SERPL-MCNC: 119 MG/DL (ref 65–140)
GLUCOSE SERPL-MCNC: 160 MG/DL (ref 65–140)
GLUCOSE SERPL-MCNC: 180 MG/DL (ref 65–140)
GLUCOSE SERPL-MCNC: 227 MG/DL (ref 65–140)
HCT VFR BLD AUTO: 23.5 % (ref 36.5–49.3)
HGB BLD-MCNC: 7.3 G/DL (ref 12–17)
IMM GRANULOCYTES # BLD AUTO: 0.11 THOUSAND/UL (ref 0–0.2)
IMM GRANULOCYTES NFR BLD AUTO: 1 % (ref 0–2)
LYMPHOCYTES # BLD AUTO: 1.64 THOUSANDS/ÂΜL (ref 0.6–4.47)
LYMPHOCYTES NFR BLD AUTO: 20 % (ref 14–44)
MCH RBC QN AUTO: 26 PG (ref 26.8–34.3)
MCHC RBC AUTO-ENTMCNC: 31.1 G/DL (ref 31.4–37.4)
MCV RBC AUTO: 84 FL (ref 82–98)
MONOCYTES # BLD AUTO: 1.03 THOUSAND/ÂΜL (ref 0.17–1.22)
MONOCYTES NFR BLD AUTO: 13 % (ref 4–12)
NEUTROPHILS # BLD AUTO: 5.44 THOUSANDS/ÂΜL (ref 1.85–7.62)
NEUTS SEG NFR BLD AUTO: 65 % (ref 43–75)
NRBC BLD AUTO-RTO: 0 /100 WBCS
PLATELET # BLD AUTO: 322 THOUSANDS/UL (ref 149–390)
PMV BLD AUTO: 9.7 FL (ref 8.9–12.7)
POTASSIUM SERPL-SCNC: 3.6 MMOL/L (ref 3.5–5.3)
RBC # BLD AUTO: 2.81 MILLION/UL (ref 3.88–5.62)
RH BLD: POSITIVE
SODIUM SERPL-SCNC: 139 MMOL/L (ref 135–147)
SPECIMEN EXPIRATION DATE: NORMAL
WBC # BLD AUTO: 8.26 THOUSAND/UL (ref 4.31–10.16)

## 2023-01-14 RX ADMIN — INSULIN LISPRO 1 UNITS: 100 INJECTION, SOLUTION INTRAVENOUS; SUBCUTANEOUS at 21:17

## 2023-01-14 RX ADMIN — INSULIN GLARGINE 10 UNITS: 100 INJECTION, SOLUTION SUBCUTANEOUS at 21:14

## 2023-01-14 RX ADMIN — PANTOPRAZOLE SODIUM 40 MG: 40 TABLET, DELAYED RELEASE ORAL at 06:12

## 2023-01-14 RX ADMIN — ACETAMINOPHEN 975 MG: 325 TABLET, FILM COATED ORAL at 16:05

## 2023-01-14 RX ADMIN — COLLAGENASE SANTYL 1 APPLICATION: 250 OINTMENT TOPICAL at 06:30

## 2023-01-14 RX ADMIN — METHOCARBAMOL 500 MG: 500 TABLET ORAL at 08:30

## 2023-01-14 RX ADMIN — SIMETHICONE 80 MG: 80 TABLET, CHEWABLE ORAL at 08:30

## 2023-01-14 RX ADMIN — AMLODIPINE BESYLATE 5 MG: 5 TABLET ORAL at 18:05

## 2023-01-14 RX ADMIN — INSULIN LISPRO 4 UNITS: 100 INJECTION, SOLUTION INTRAVENOUS; SUBCUTANEOUS at 16:06

## 2023-01-14 RX ADMIN — Medication 1 TABLET: at 08:30

## 2023-01-14 RX ADMIN — ASPIRIN 81 MG CHEWABLE TABLET 81 MG: 81 TABLET CHEWABLE at 08:30

## 2023-01-14 RX ADMIN — MELATONIN TAB 3 MG 3 MG: 3 TAB at 21:13

## 2023-01-14 RX ADMIN — PANTOPRAZOLE SODIUM 40 MG: 40 TABLET, DELAYED RELEASE ORAL at 16:04

## 2023-01-14 RX ADMIN — METHOCARBAMOL 500 MG: 500 TABLET ORAL at 18:05

## 2023-01-14 RX ADMIN — INSULIN LISPRO 1 UNITS: 100 INJECTION, SOLUTION INTRAVENOUS; SUBCUTANEOUS at 16:06

## 2023-01-14 RX ADMIN — OXYCODONE HYDROCHLORIDE 10 MG: 10 TABLET ORAL at 21:11

## 2023-01-14 RX ADMIN — INSULIN LISPRO 4 UNITS: 100 INJECTION, SOLUTION INTRAVENOUS; SUBCUTANEOUS at 11:19

## 2023-01-14 RX ADMIN — OXYCODONE HYDROCHLORIDE 5 MG: 5 TABLET ORAL at 04:13

## 2023-01-14 RX ADMIN — SIMETHICONE 80 MG: 80 TABLET, CHEWABLE ORAL at 11:48

## 2023-01-14 RX ADMIN — INSULIN LISPRO 2 UNITS: 100 INJECTION, SOLUTION INTRAVENOUS; SUBCUTANEOUS at 11:18

## 2023-01-14 RX ADMIN — ONDANSETRON 4 MG: 4 TABLET, ORALLY DISINTEGRATING ORAL at 06:12

## 2023-01-14 RX ADMIN — HEPARIN SODIUM 5000 UNITS: 5000 INJECTION INTRAVENOUS; SUBCUTANEOUS at 21:15

## 2023-01-14 RX ADMIN — INSULIN LISPRO 4 UNITS: 100 INJECTION, SOLUTION INTRAVENOUS; SUBCUTANEOUS at 08:33

## 2023-01-14 RX ADMIN — SIMETHICONE 80 MG: 80 TABLET, CHEWABLE ORAL at 18:05

## 2023-01-14 RX ADMIN — ERTAPENEM SODIUM 1000 MG: 1 INJECTION, POWDER, LYOPHILIZED, FOR SOLUTION INTRAMUSCULAR; INTRAVENOUS at 21:22

## 2023-01-14 RX ADMIN — CHOLECALCIFEROL TAB 10 MCG (400 UNIT) 400 UNITS: 10 TAB at 08:32

## 2023-01-14 RX ADMIN — AMLODIPINE BESYLATE 5 MG: 5 TABLET ORAL at 08:30

## 2023-01-14 RX ADMIN — ATORVASTATIN CALCIUM 40 MG: 40 TABLET, FILM COATED ORAL at 08:30

## 2023-01-14 RX ADMIN — HEPARIN SODIUM 5000 UNITS: 5000 INJECTION INTRAVENOUS; SUBCUTANEOUS at 06:12

## 2023-01-14 RX ADMIN — HEPARIN SODIUM 5000 UNITS: 5000 INJECTION INTRAVENOUS; SUBCUTANEOUS at 16:04

## 2023-01-14 RX ADMIN — TAMSULOSIN HYDROCHLORIDE 0.4 MG: 0.4 CAPSULE ORAL at 16:04

## 2023-01-14 RX ADMIN — ACETAMINOPHEN 975 MG: 325 TABLET, FILM COATED ORAL at 21:12

## 2023-01-14 RX ADMIN — ACETAMINOPHEN 975 MG: 325 TABLET, FILM COATED ORAL at 06:12

## 2023-01-14 NOTE — PROGRESS NOTES
PM&R Progress Note:     Subjective: Patient seen briefly today after therapies  Tried to do what he could but was feeling overall tired and with some mild abdominal pain and left thorax pain  Took a dose of Oxy IR today around 4PM with some improvement  Eating better today  Discharge targeted now for next week  Objective: No fevers  Drain #2 = 35 cc output today  Other drains 0-5 cc today       Assessment/Plan:  Patient stable, currently afebrile  Rehabilitation plan:  ? Current Function: Meeting his goals of supervision level in both PT and OT  With recent medical set-back requires Min A   Goals supervision - Mod I   ? Patient is participating and tolerance is variable today  ? Dispo: Probable discharge home next week - will await culture results, finalized ID plan, and clinical status       Medical Plan:  Sepsis/Intra-abdominal abscess:   ? Patient s/p IR tube checks and thoracentesis 1/12/23   ? Tube #2 exchanged  ? 300 cc clear fluid from thoracentesis today from left pleural space  ? CBC/diff trending down  - repeat in AM  ? Afebrile today - ok to do VS per shift again  ? Continue Ertapenum 5 days post new drain (1/17/23)  ?  Case management did confirm IV Ertapenem can be ordered for home through the home care company if needed      Dragan Townsend MD  PM&R

## 2023-01-14 NOTE — PROGRESS NOTES
01/14/23 1430   Restrictions/Precautions   Precautions Fall Risk;Contact/isolation;Multiple lines;Supervision on toilet/commode  (VICTORINO drain, ileostomy)   Weight Bearing Restrictions No   ROM Restrictions No   Sit to Lying   Type of Assistance Needed Physical assistance   Physical Assistance Level 25% or less   Comment Pt tired at end of session, needed assitance for LLE   Sit to Lying CARE Score 3   Lying to Sitting on Side of Bed   Type of Assistance Needed Supervision   Comment HOB already slightly elevated   Lying to Sitting on Side of Bed CARE Score 4   Sit to Stand   Type of Assistance Needed Supervision   Sit to Stand CARE Score 4   Bed-Chair Transfer   Type of Assistance Needed Supervision   Comment RW   Chair/Bed-to-Chair Transfer CARE Score 4   Walk 10 Feet   Type of Assistance Needed Incidental touching   Comment CG during turn  using RW otherwise close S   Walk 10 Feet CARE Score 4   Walk 150 Feet   Reason if not Attempted Safety concerns   Walk 150 Feet CARE Score 88   Ambulation   Primary Mode of Locomotion Prior to Admission Walk   Distance Walked (feet) 20 ft  (x3)   Assist Device Roller Walker   Walk Assist Level Contact Guard   Findings did not challenge longer distance, performed short distances t/o session-  Pt appeared unsteady 1 time when turning- pt stated i am just re adjusting my arms   Does the patient walk? 2   Yes   Curb or Single Stair   Style negotiated Curb   Type of Assistance Needed Incidental touching   Comment CG for general safety  up/down 6inch curb step with RW   1 Step (Curb) CARE Score 4   Stairs   Type Stairs   # of Steps 2   Weight Bearing Precautions Fall Risk   Assist Devices Bilateral Rail   Findings Performed short walk to the steps then performed 2 steps  descend forward (lacked eccentric control) but no buckle ,  then short walk back to [facility]   Therapeutic Interventions   Strengthening Seated LAQ 6 reps x3 with 2#,  heel raises MRE at top of knee Mod resisted,  Hip abd MRE min resisted,  Hip ext MRE under thigh Mod resisted, Hip add isometric   Flexibility bilat gastroc stretch 2min ea   Balance Pt stood from [facility] by bedside without walker infront (steady when unsupported) pt pleased with being able to stand without walker but educated will need walker for safety   Assessment   Treatment Assessment 60 min session focused on functional mobility including bed mobility/ short distance ambulation, curb style step as this is house barrier,  stairs which pt appeared fatigued so only did 2, and finished session with seated LE strengthening  Ed pt on self pacing because he seems to run through ex fairly quick and then is exhausted  Ed pt about staying motivated at home to perform walking and ther ex to continue progression  Pt continues to show progress but is still highly limited with physical activity from deconditioning  PT Barriers   Physical Impairment Decreased strength;Decreased endurance; Impaired balance;Decreased mobility;Pain   Recommendation   PT Discharge Recommendation Home with home health rehabilitation   PT Therapy Minutes   PT Time In 1430   PT Time Out 1530   PT Total Time (minutes) 60   PT Mode of treatment - Individual (minutes) 60   PT Mode of treatment - Concurrent (minutes) 0   PT Mode of treatment - Group (minutes) 0   PT Mode of treatment - Co-treat (minutes) 0   PT Mode of Treatment - Total time(minutes) 60 minutes   PT Cumulative Minutes 1942   Therapy Time missed   Time missed?  No

## 2023-01-14 NOTE — PROGRESS NOTES
01/14/23 0830   Pain Assessment   Pain Assessment Tool 0-10   Pain Score 4   Pain Location/Orientation Orientation: Bilateral;Location: Abdomen; Location: Generalized; Location: Chest   Pain Onset/Description Onset: Ongoing;Frequency: Constant/Continuous; Descriptor: [address] Pain Intervention(s) Repositioned; Emotional support; Rest   Restrictions/Precautions   Precautions Fall Risk;Contact/isolation;Multiple lines;Supervision on toilet/commode  (VICTORINO drain, ileostomy)   Weight Bearing Restrictions No   ROM Restrictions No   Oral Hygiene   Type of Assistance Needed Independent   Physical Assistance Level No physical assistance   Comment seated in w/c at sink to brush teeth   Oral Hygiene CARE Score 6   Grooming   Able To Initiate Tasks;Comb/Brush Hair;Wash/Dry Face;Brush/Clean Teeth;Wash/Dry Hands   Findings Pt indep for grooming tasks while seated in w/c at sink 2* abdominal pain and generalized fatigue  Shower/Bathe Self   Type of Assistance Needed Physical assistance   Physical Assistance Level 25% or less   Comment Pt engaged in sponge bath while seated in w/c at sink 2* fatigue and abdominal pain, able to wash 8/10 parts  Seated with Sup and with increased time, pt washed UB, and B/L upper legs  Required A to wash B/L lower legs/feet 2* pain w/dynamic fxl reaching  CGA in stance while pt washed james/rear, no LOB  Clorhexidine wash utilized where appropriate around drain sites  Shower/Bathe Self CARE Score 3   Bathing   Assessed Bath Style Sponge Bath   Anticipated D/C Bath Style Sponge Bath   Able to Challenge Games No   Able to [employer] Temperature Yes   Able to Wash/Rinse/Dry (body part) Left Arm;Right Arm;R Upper Leg;L Upper Leg;Chest;Abdomen;Perineal Area; Buttocks   Limitations Noted in Balance; Endurance;ROM;Strength;Timeliness   Positioning Seated;Standing   Tub/Shower Transfer   Reason Not Assessed Sponge Bath;Medical   Upper Body Dressing   Type of Assistance Needed Physical assistance Physical Assistance Level 25% or less   Comment A to manage in back and to manage IV line   Upper Body Dressing CARE Score 3   Lower Body Dressing   Type of Assistance Needed Physical assistance   Physical Assistance Level 76% or more   Comment total A to thread LEs through hospital pants while seated 2* decreased fxl reach 2* abdominal pain  Pt also required A for CM up/down over hips in stance   Lower Body Dressing CARE Score 2   Putting On/Taking Off Footwear   Type of Assistance Needed Physical assistance   Physical Assistance Level Total assistance   Comment pt unable to complete dynamic reach toward feet or xleg technique 2* abdominal pain, requiring A to manage socks on/off   Putting On/Taking Off Footwear CARE Score 1   Dressing/Undressing Clothing   Remove UB Clothes   (hospital gown)   Deyanne Prude UB Clothes   (hospital gown)   Remove LB Clothes Pants;Socks   Don LB Clothes Pants;Socks   Limitations Noted In Balance; Endurance;Strength;ROM; Timeliness   Positioning Supported Sit;Standing   Sit to Lying   Type of Assistance Needed Physical assistance   Physical Assistance Level 25% or less   Comment A for LLE 2* fatigue   Sit to Lying CARE Score 3   Lying to Sitting on Side of Bed   Type of Assistance Needed Supervision   Physical Assistance Level No physical assistance   Lying to Sitting on Side of Bed CARE Score 4   Sit to Stand   Type of Assistance Needed Supervision; Adaptive equipment   Physical Assistance Level No physical assistance   Comment CS w/RW   Sit to Stand CARE Score 4   Bed-Chair Transfer   Type of Assistance Needed Supervision; Adaptive equipment   Physical Assistance Level No physical assistance   Comment CS SPTs w/RW   Chair/Bed-to-Chair Transfer CARE Score 4   Cognition   Overall Cognitive Status WFL   Arousal/Participation Alert; Cooperative   Attention Attends with cues to redirect   Orientation Level Oriented X4   Memory Within functional limits   Following Commands Follows one step commands with increased time or repetition   Activity Tolerance   Activity Tolerance Patient limited by fatigue;Patient limited by pain   Medical Staff Made Aware nsg present and aware of pt pain/fatigue level   Assessment   Treatment Assessment Pt seen for 90min skilled OT session focused on ADL skills retraining, bed mobility, and providing emotional support, for increased independence w/ADLs/IADLs and decreased caregiver burden  See detailed descriptions of fxl performance above  Pt reported slightly improved fatigue and pain as compared with yesterday morning's ADL session, able to complete ADL OOB at sink today which is an improvement over bed-level ADL that occurred yesterday  Pt continues to demo emotional lability regarding medical status and requires frequent emotional support and encouragement, as well as gentle redirection, pt continues to be receptive  Pt would benefit from continued skilled OT focused on ADL retraining, LHAE education/training, Functional Transfers, Standing tolerance, Standing balance , Fine motor strengthening , Gross motor strengthening , DME training/education, Family training/education, Energy conservation training/education, healthy coping education, Leisure and social pursuits and Core/trunk control/strengthening  Prognosis Fair   Problem List Decreased strength;Decreased range of motion;Decreased endurance; Impaired balance;Decreased mobility;Pain;Decreased skin integrity   Barriers to Discharge Decreased caregiver support   Plan   Treatment/Interventions ADL retraining;Functional transfer training; Therapeutic exercise; Endurance training;Patient/family training;Equipment eval/education; Bed mobility; Compensatory technique education;Spoke to nursing   Progress Slow progress, decreased activity tolerance   Recommendation   OT Discharge Recommendation Home with home health rehabilitation   OT Therapy Minutes   OT Time In 0830   OT Time Out 1000   OT Total Time (minutes) 90   OT Mode of treatment - Individual (minutes) 90   OT Mode of treatment - Concurrent (minutes) 0   OT Mode of treatment - Group (minutes) 0   OT Mode of treatment - Co-treat (minutes) 0   OT Mode of Treatment - Total time(minutes) 90 minutes   OT Cumulative Minutes 1903   Therapy Time missed   Time missed?  No

## 2023-01-14 NOTE — PROGRESS NOTES
01/14/23 1230   Restrictions/Precautions   Precautions Fall Risk;Contact/isolation;Multiple lines  (VICTORINO drain, ileostomy)   Lying to Sitting on Side of Bed   Type of Assistance Needed Supervision   Lying to Sitting on Side of Bed CARE Score 4   Sit to Stand   Type of Assistance Needed Supervision   Comment CS   Sit to Stand CARE Score 4   Bed-Chair Transfer   Type of Assistance Needed Supervision   Comment CS with RW   Chair/Bed-to-Chair Transfer CARE Score 4   Walk 10 Feet   Type of Assistance Needed Supervision   Comment RW   Walk 10 Feet CARE Score 4   Walk 50 Feet with Two Turns   Type of Assistance Needed Supervision   Comment RW   Walk 50 Feet with Two Turns CARE Score 4   Walk 150 Feet   Reason if not Attempted Safety concerns   Walk 150 Feet CARE Score 88   Ambulation   Primary Mode of Locomotion Prior to Admission Walk   Distance Walked (feet) 75 ft  (x2)   Assist Device Roller Walker   Walk Assist Level Chair Follow;Close Supervision   Findings Pt able to amb this session with slightly further distance than previous days - limited by fatigue   Does the patient walk? 2  Yes   Therapeutic Interventions   Balance standing at bedside unsupported to use urinal  (no LOB when unsupported)   Assessment   Treatment Assessment 30 min session started with pt in bed,  needed assistance to empty ileostomy, then once getting out of bed pt needed to use urinal, this session pt amb 2 bouts of 75 feet which is progress from previous days  Pt fatigues quickly and requires rest breaks between bouts    Cont skilled PT toward LTGs   Barriers to Discharge Decreased caregiver support   Plan   Progress Slow progress, decreased activity tolerance   PT Therapy Minutes   PT Time In 1230   PT Time Out 1300   PT Total Time (minutes) 30   PT Mode of treatment - Individual (minutes) 30   PT Mode of treatment - Concurrent (minutes) 0   PT Mode of treatment - Group (minutes) 0   PT Mode of treatment - Co-treat (minutes) 0   PT Mode of Treatment - Total time(minutes) 30 minutes   PT Cumulative Minutes 1952   Therapy Time missed   Time missed?  No

## 2023-01-14 NOTE — PLAN OF CARE
Problem: Prexisting or High Potential for Compromised Skin Integrity  Goal: Skin integrity is maintained or improved  Description: INTERVENTIONS:  - Identify patients at risk for skin breakdown  - Assess and monitor skin integrity  - Assess and monitor nutrition and hydration status  - Monitor labs   - Assess for incontinence   - Turn and reposition patient  - Assist with mobility/ambulation  - Relieve pressure over bony prominences  - Avoid friction and shearing  - Provide appropriate hygiene as needed including keeping skin clean and dry  - Evaluate need for skin moisturizer/barrier cream  - Collaborate with interdisciplinary team   - Patient/family teaching  - Consider wound care consult   Outcome: Progressing     Problem: Potential for Falls  Goal: Patient will remain free of falls  Description: INTERVENTIONS:  - Educate patient/family on patient safety including physical limitations  - Instruct patient to call for assistance with activity   - Consult OT/PT to assist with strengthening/mobility   - Keep Call bell within reach  - Keep bed low and locked with side rails adjusted as appropriate  - Keep care items and personal belongings within reach  - Initiate and maintain comfort rounds  - Make Fall Risk Sign visible to staff  - Offer Toileting every 2-4 Hours, in advance of need  - Initiate/Maintain bed/chair alarm  - Obtain necessary fall risk management equipment: nonskid footwear   - Apply yellow socks and bracelet for high fall risk patients  - Consider moving patient to room near nurses station  Outcome: Progressing     Problem: PAIN - ADULT  Goal: Verbalizes/displays adequate comfort level or baseline comfort level  Description: Interventions:  - Encourage patient to monitor pain and request assistance  - Assess pain using appropriate pain scale  - Administer analgesics based on type and severity of pain and evaluate response  - Implement non-pharmacological measures as appropriate and evaluate response  - Consider cultural and social influences on pain and pain management  - Notify physician/advanced practitioner if interventions unsuccessful or patient reports new pain  Outcome: Progressing     Problem: INFECTION - ADULT  Goal: Absence or prevention of progression during hospitalization  Description: INTERVENTIONS:  - Assess and monitor for signs and symptoms of infection  - Monitor lab/diagnostic results  - Monitor all insertion sites, i e  indwelling lines, tubes, and drains  - Monitor endotracheal if appropriate and nasal secretions for changes in amount and color  - [place] appropriate cooling/warming therapies per order  - Administer medications as ordered  - Instruct and encourage patient and family to use good hand hygiene technique  - Identify and instruct in appropriate isolation precautions for identified infection/condition  Outcome: Progressing     Problem: SAFETY ADULT  Goal: Patient will remain free of falls  Description: INTERVENTIONS:  - Educate patient/family on patient safety including physical limitations  - Instruct patient to call for assistance with activity   - Consult OT/PT to assist with strengthening/mobility   - Keep Call bell within reach  - Keep bed low and locked with side rails adjusted as appropriate  - Keep care items and personal belongings within reach  - Initiate and maintain comfort rounds  - Make Fall Risk Sign visible to staff  - Offer Toileting every 2-4 Hours, in advance of need  - Initiate/Maintain bed/chair alarm  - Obtain necessary fall risk management equipment: nonskid footwear  - Apply yellow socks and bracelet for high fall risk patients  - Consider moving patient to room near nurses station  Outcome: Progressing  Goal: Maintain or return to baseline ADL function  Description: INTERVENTIONS:  -  Assess patient's ability to carry out ADLs; assess patient's baseline for ADL function and identify physical deficits which impact ability to perform ADLs (bathing, care of mouth/teeth, toileting, grooming, dressing, etc )  - Assess/evaluate cause of self-care deficits   - Assess range of motion  - Assess patient's mobility; develop plan if impaired  - Assess patient's need for assistive devices and provide as appropriate  - Encourage maximum independence but intervene and supervise when necessary  - Involve family in performance of ADLs  - Assess for home care needs following discharge   - Consider OT consult to assist with ADL evaluation and planning for discharge  - Provide patient education as appropriate  Outcome: Progressing  Goal: Maintains/Returns to pre admission functional level  Description: INTERVENTIONS:  - Perform BMAT or MOVE assessment daily    - Set and communicate daily mobility goal to care team and patient/family/caregiver  - Collaborate with rehabilitation services on mobility goals if consulted  - Perform Range of Motion  times a day  - Reposition patient every  hours    - Dangle patient  times a day  - Stand patient  times a day  - Ambulate patient  times a day  - Out of bed to chair  times a day   - Out of bed for meals  times a day  - Out of bed for toileting  - Record patient progress and toleration of activity level   Outcome: Progressing     Problem: DISCHARGE PLANNING  Goal: Discharge to home or other facility with appropriate resources  Description: INTERVENTIONS:  - Identify barriers to discharge w/patient and caregiver  - Arrange for needed discharge resources and transportation as appropriate  - Identify discharge learning needs (meds, wound care, etc )  - Arrange for interpretive services to assist at discharge as needed  - Refer to Case Management Department for coordinating discharge planning if the patient needs post-hospital services based on physician/advanced practitioner order or complex needs related to functional status, cognitive ability, or social support system  Outcome: Progressing     Problem: Nutrition/Hydration-ADULT  Goal: Nutrient/Hydration intake appropriate for improving, restoring or maintaining nutritional needs  Description: Monitor and assess patient's nutrition/hydration status for malnutrition  Collaborate with interdisciplinary team and initiate plan and interventions as ordered  Monitor patient's weight and dietary intake as ordered or per policy  Utilize nutrition screening tool and intervene as necessary  Determine patient's food preferences and provide high-protein, high-caloric foods as appropriate       INTERVENTIONS:  - Monitor oral intake, urinary output, labs, and treatment plans  - Assess nutrition and hydration status and recommend course of action  - Evaluate amount of meals eaten  - Assist patient with eating if necessary   - Allow adequate time for meals  - Recommend/ encourage appropriate diets, oral nutritional supplements, and vitamin/mineral supplements  - Order, calculate, and assess calorie counts as needed  - Recommend, monitor, and adjust tube feedings and TPN/PPN based on assessed needs  - Assess need for intravenous fluids  - Provide specific nutrition/hydration education as appropriate  - Include patient/family/caregiver in decisions related to nutrition  Outcome: Progressing     Problem: MOBILITY - ADULT  Goal: Maintain or return to baseline ADL function  Description: INTERVENTIONS:  -  Assess patient's ability to carry out ADLs; assess patient's baseline for ADL function and identify physical deficits which impact ability to perform ADLs (bathing, care of mouth/teeth, toileting, grooming, dressing, etc )  - Assess/evaluate cause of self-care deficits   - Assess range of motion  - Assess patient's mobility; develop plan if impaired  - Assess patient's need for assistive devices and provide as appropriate  - Encourage maximum independence but intervene and supervise when necessary  - Involve family in performance of ADLs  - Assess for home care needs following discharge   - Consider OT consult to assist with ADL evaluation and planning for discharge  - Provide patient education as appropriate  Outcome: Progressing  Goal: Maintains/Returns to pre admission functional level  Description: INTERVENTIONS:  - Perform BMAT or MOVE assessment daily    - Set and communicate daily mobility goal to care team and patient/family/caregiver  - Collaborate with rehabilitation services on mobility goals if consulted  - Perform Range of Motion 3 times a day  - Reposition patient every 2 hours    - Dangle patient 3 times a day  - Stand patient 3 times a day  - Ambulate patient 3 times a day  - Out of bed to chair 3 times a day   - Out of bed for meals 3 times a day  - Out of bed for toileting  - Record patient progress and toleration of activity level   Outcome: Progressing

## 2023-01-14 NOTE — PROGRESS NOTES
PM&R Progress Note:     Subjective: Patient seen face-to-face today in physical therapy  Doing better and making better attempts at proving his endurance again  Feeling tired but able to do the therapy  Denies increased pain in abdomen  Pain at his thoracentesis site has also improved  Eating much better today  No fevers overnight  No chills subjectively  Objective:   /76 (BP Location: Left arm)   Pulse 72   Temp 98 3 °F (36 8 °C) (Oral)   Resp 16   Ht 5' 10" (1 778 m)   Wt 85 3 kg (188 lb)   SpO2 95%   BMI 26 98 kg/m²   Drain #2 = 15 cc output today  Other drains 5-10 cc today       Assessment/Plan:  Patient stable, currently afebrile  Rehabilitation plan:  ? Current Function: With recent medical set-back requires Min A   Goals supervision - Mod I   ? Patient is participating and tolerance has improved today of bed   ? Dispo: Probable discharge home next week - will await culture results, finalized ID plan, and clinical status       Medical Plan:  Sepsis/Intra-abdominal abscess:   ? Patient s/p IR tube checks and thoracentesis 1/12/23   ? Tube #2 exchanged  ? 300 cc clear fluid from thoracentesis today from left pleural space  ? CBC/diff trending down   ? Afebrile   ? Continue Ertapenum 5 days post new drain (1/17/23) infectious disease services following  ? Blood cultures from 1/10/2023 no growth at 72 hours  ?  Case management did confirm IV Ertapenem can be ordered for home through the home care company if needed    Attempted to call patient's wife to provide medical and functional update -  left message on Alberto Cox MD  PM&R

## 2023-01-14 NOTE — PROGRESS NOTES
Ethan Tenorio reached out to have patient's port re accessed  Port re accessed at [phone] per order

## 2023-01-14 NOTE — PROGRESS NOTES
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  • s/p hepatic resection and reversal of colostomy on 11/7  • s/p right hemicolectomy with partial descending colectomy colocolonic anastomosis with loop ileostomy and mid line abdominal VAC placement 11/16  • WC following  • CT  Abd/pelvis 12/17 = loculated collection along splenic recess   Has perisplenic hilum collection as well --> to IR 12/20 for drain placement in both perigastric and perisplenic area and cx sent from both areas = Ecoli  • Flush drains with 10ml qd;   • S-O following/Dr Marilin Camejo   • Tube check (incl perc radha tube) done 1/3 = unchanged with still some fluid around each drain so kept in     • On 1/10/23, WBC jumped, febrile to 102 4 --> Ertapenem restarted  CT A/P showed a slightly enlarging perihepatic collection near segment 3 --> #2 drain upsized 1/12/23 and drained 40ml purulent fluid  • Next tube check for @1/26/23 with potential to remove left JPs #3 and #4  • BCs 1/10 = NG x 48 hrs     Acute cholecystitis  • s/p percutaneous tube placement 12/8 with tube check on 12/12, 12/14  • GI saw due to alk phos elevation = felt 2/2 infiltrative disease rather than focal biliary obstruction   AMA was negative     • Alkaline phosphatase isoenzyme sent 12/17 (48% liver/52% bone) = GI  recommended continuing to monitor his LFTs and defer to surgical oncology for further w/u and plan      • TB normalized  • GI wrote note 1/4 = aware labs incl GGT of 1992 (previously 4002 on 12/17)     • Per GI, they wanted cholangiogram with next tube check since wasn't done 1/3 but d/w Dr Marilin Camejo and he did not want done with tube check 1/12/23      Bacteremia/abdominal abscess  • ID following   • s/p Ertapenem was switched to Doxycycline per ID as of 12/30 but had to be switched back 2/2 nausea  • Ertapenem restarted 1/10/23 needs 5 days additional after manipulation of drains through 1/17  • Wbc normalized     Hypertension  • On Norvasc 5mg BID  • stable     Diabetes mellitus  • Home:  Glargine 35U qam/Glyburide-Metformin 5-500 qam/Januvia 100mg qam  • Here:  Lantus 10U qhs/Lispro 4U TID  • If he does not eat = hold Lispro WM dose and just cover with SSI  • Has a DEXCOM meter at home  • Continue DM diet and QID Accuchecks/SSI  • stable     Acute on chronic renal failure  • Stage III; baseline 1 2-1 4  • Lisinopril currently on hold  • Creat improved s/p IVF       Anemia  • Multifactorial due to recent infection, surgery, CKD stage III  • Transfused on 12/15, 12/16/22 and 12/30  • Hemoglobin 7 3 today he is not interested in transfusion     Atypical chest pain  • With elevation in troponin/EKG changes on acute side  • Cardiology saw then and cleared pt for discharge  • Did not recommend any further work up  • No further chest pain     Situational depression  • Neuropsychology consulted  • Patient not interested in medications at this point in time     Malnutrition  • Glucerna makes him nauseated  • On 12/15, ordered double protein  • Meal completion is still erratic     Left pleural effusion  • CXR on 12/14 showed small left pleural effusion and was suspect for CHF  • ECHO 11/9/22 = LVEF 55%, unable to assess diastolic function, mild TR  • CT on 1/10 showed mod-lg left pl effusion and had thoracentesis 1/2  • Cyto/labs pending from pleural fluid        Discharge date:  TBA    The above assessment and plan was reviewed and updated as determined by my evaluation of the patient on 1/14/2023      Labs:   Results from last 7 days   Lab Units 01/14/23  0621 01/13/23  0530   WBC Thousand/uL 8 26 10 94*   HEMOGLOBIN g/dL 7 3* 7 4*   HEMATOCRIT % 23 5* 23 7*   PLATELETS Thousands/uL 322 362     Results from last 7 days   Lab Units 01/14/23  0621 01/12/23  0452 SODIUM mmol/L 139 136   POTASSIUM mmol/L 3 6 3 9   CHLORIDE mmol/L 111* 107   CO2 mmol/L 21 20*   BUN mg/dL 14 16   CREATININE mg/dL 1 09 1 00   CALCIUM mg/dL 8 4 8 5             Results from last 7 days   Lab Units 01/14/23  0622 01/13/23 2058 01/13/23  1623   POC GLUCOSE mg/dl 119 166* 153*       Review of Scheduled Meds:  Current Facility-Administered Medications   Medication Dose Route Frequency Provider Last Rate   • acetaminophen  325 mg Oral Q4H PRN Leslie Cooley MD     • acetaminophen  975 mg Oral Q8H [address] GAURANG Keith     • amLODIPine  5 mg Oral BID GAURANG Keith     • aspirin  81 mg Oral Daily GAURANG Keith     • atorvastatin  40 mg Oral Daily GAURANG Keith     • calcium carbonate  500 mg Oral BID PRN Caroleen Alpers, CRNP     • cholecalciferol  400 Units Oral Daily Caroleen Alpers, CRNP     • collagenase   Topical Daily Leslie Cooley MD     • ertapenem  1,000 mg Intravenous Q24H Gabriel Galarza MD 1,000 mg (01/13/23 2230)   • heparin (porcine)  5,000 Units Subcutaneous Q8H [address] GAURANG Keith     • insulin glargine  10 Units Subcutaneous HS GAURANG Emanuel     • insulin lispro  1-5 Units Subcutaneous HS GAURANG Keith     • insulin lispro  1-6 Units Subcutaneous TID AC GAURANG Keith     • insulin lispro  4 Units Subcutaneous TID With Meals Lafaye Najjar, CRNP     • iohexol  35 mL Oral 90 min pre-procedure Lafaye Najjar, CRNP     • melatonin  3 mg Oral HS GAURANG Keith     • methocarbamol  500 mg Oral BID Caroleen Alpers, CRNP     • multivitamin-minerals  1 tablet Oral Daily GAURANG Keith     • ondansetron  4 mg Oral Q6H PRN GAURANG Keith     • ondansetron  4 mg Oral BID Lafaye Najjar, CRNP     • oxyCODONE  10 mg Oral Q6H PRN Caroleen Alpers, CRNP     • oxyCODONE  2 5 mg Oral Q3H PRN Caroleen Alpers, CRNP     • oxyCODONE  5 mg Oral Q6H PRN GAURANG Keith     • pantoprazole  40 mg Oral BID ABIGAIL Almanzar GAURANG Martin     • simethicone  80 mg Oral 4x Daily (with meals and at bedtime) GAURANG Jimenez     • tamsulosin  0 4 mg Oral Daily With Dinner GAURANG Jimenez         Subjective/ HPI: Patient seen and examined  Patients overnight issues or events were reviewed with nursing or staff during rounds or morning huddle session  New or overnight issues include the following:     No new or overnight issues  Offers no complaints  States he had the best sleep in awhile overnight, he feels better taking the pain medication a little more often  Denies any abdominal pain overnight  Drains were all flushed this am without any issues  Anxious for dc next wk     ROS:   A 10 point ROS was performed; negative except as noted above  Imaging:     IR drainage tube check/change/reposition/reinsertion/upsize   Final Result by Monika Monge MD (01/12 1646)   Impression:   1  Drain check demonstrated all 3 drains to be patent  No fistula to the bowel  There are still small collections around each drain and therefore the drains were left in place except the midline drain #2 was replaced and upsized and did drain    approximately 40cc of purulent fluid  PLAN: Tubes to bag drainage  Return in 2 weeks for tube checks and possible removal of left sided tubes 3 and 4  Workstation performed: [id number]         IR IN-Patient Thoracentesis   Final Result by Monika Monge MD (01/12 1643)   Impression:   1  Successful ultrasound-guided left thoracentesis yielding 300 cc of clear pleural fluid  Workstation performed: [id number]         CT abdomen pelvis w contrast   Final Result by Erick Estrada MD (01/11 7574)      1  Moderate to large left effusion with adjacent atelectasis  2  Interval worsening left hepatic postsurgical site fluid collection now measuring 4 2 x 3 2 cm  Correlate with output  3  Resolution of subtle splenic collection with catheter in place    Bilobed thick walled fluid collection is smaller inferior to this         4  Percutaneous cholecystostomy tube in place with persistent surrounding inflammatory changes  Unchanged subhepatic collection  5  Tumor involvement involving the right hepatic lobe appears slightly worsened  Workstation performed: [id number]         IR drainage tube check and/or removal   Final Result by Uche Willson MD (01/04 1631)   Impression:   1  Drain check demonstrated all 3 drains to be patent  No fistula to the bowel  There are still small collections around each drain and therefore the drains were left in place  PLAN: Tubes to bag drainage  Return in 2 weeks for tube checks  Workstation performed: [id number]         IR drainage tube placement   Final Result by Valeria Ren MD (12/20 2003)      Percutaneous placement of abscess drainage catheters into the perigastric and perisplenic abscesses using 10-L[place]ian catheters  Plan:       - Catheters to bulb suction drainage    - Flush catheters with 10 mL normal saline daily    - Return in 2 weeks for drain check  Workstation performed: [id number]         CT abdomen pelvis w contrast   Final Result by Zheng Lan MD (88/21 2099)      Status post cholecystostomy tube placement with decompression of the gallbladder  In addition, a catheter placed adjacent to the cut edge of the liver is increased position with near complete resolution of fluid collection  Loculated collection along the splenic recess of the lesser sac is unchanged in size  Additional perisplenic collection in the hilum and along the inferior pole are unchanged  Collection of fluid and gas in the left upper quadrant adjacent to left kidney also not significantly changed    No extravasated oral contrast             Workstation performed: [id number]         IR drainage tube check/change/reposition/reinsertion/upsize    (Results Pending)   IR cholecystostomy tube check and/or removal    (Results Pending)       *Labs /Radiology studies reviewed  *Medications reviewed and reconciled as needed  *Please refer to order section for additional ordered labs studies  *Case discussed with primary attending during morning huddle case rounds    Physical Examination:  Vitals:   Vitals:    01/13/23 0824 01/13/23 1424 01/13/23 2040 01/14/23 0554   BP: 126/71 140/69 136/75 129/73   BP Location:  Left arm Left arm Left arm   Pulse:  100 79 70   Resp:  18 20 18   Temp:  99 6 °F (37 6 °C) 98 6 °F (37 °C) 98 3 °F (36 8 °C)   TempSrc:  Oral Oral Oral   SpO2:  97% 99% 94%   Weight:       Height:           GEN: No apparent distress, interactive; frail  NEURO: Alert and oriented x3  HEENT: Pupils are equal and reactive, EOMI, mucous membranes are moist, face symmetrical  CV: S1 S2 regular, no MRG, no peripheral edema noted  RESP: Lungs are clear bilaterally, no wheezes, rales or rhonchi noted, on room air, respirations easy and non labored  GI: Flat, soft non tender, non distended; +BS x4; ileostomy LUQ draining soft brown stool; cholecystectomy tube with bilious drainage present; VICTORINO drain x 3 all with purulent drainage noted  : Voiding without difficulty  MUSC: Moves all extremities  SKIN: pink, warm and dry, normal turgor, abdominal dressing intact; refer to media images          The above physical exam was reviewed and updated as determined by my evaluation of the patient on 1/14/2023  Invasive Devices     Central Venous Catheter Line  Duration           Port A Cath 03/10/22 Right Chest 309 days          Drain  Duration           Ileostomy LUQ 57 days    Cholecystostomy Tube 32 days    Abscess Drain Abdomen 24 days    Abscess Drain Back 24 days    Abscess Drain Abdomen 1 day                   VTE Pharmacologic Prophylaxis: Heparin  Code Status: Level 1 - Full Code  Current Length of Stay: 31 day(s)      Total time spent:  30 minutes with more than 50% spent counseling/coordinating care   Counseling includes discussion with patient re: progress  and discussion with patient of his/her current medical state/information  Coordination of patient's care was performed in conjunction with primary service  Time invested included review of patient's labs, vitals, and management of their comorbidities with continued monitoring  In addition, this patient was discussed with medical team including physician and advanced extenders  The care of the patient was extensively discussed and appropriate treatment plan was formulated unique for this patient  Medical decision making for the day was made by supervising physician unless otherwise noted in their attestation statement  ** Please Note:  voice to text software may have been used in the creation of this document   Although proof errors in transcription or interpretation are a potential of such software**

## 2023-01-15 LAB
BACTERIA SPEC BFLD CULT: NO GROWTH
GLUCOSE SERPL-MCNC: 127 MG/DL (ref 65–140)
GLUCOSE SERPL-MCNC: 175 MG/DL (ref 65–140)
GLUCOSE SERPL-MCNC: 193 MG/DL (ref 65–140)
GLUCOSE SERPL-MCNC: 97 MG/DL (ref 65–140)
GRAM STN SPEC: NORMAL
GRAM STN SPEC: NORMAL

## 2023-01-15 RX ADMIN — SIMETHICONE 80 MG: 80 TABLET, CHEWABLE ORAL at 12:27

## 2023-01-15 RX ADMIN — INSULIN LISPRO 1 UNITS: 100 INJECTION, SOLUTION INTRAVENOUS; SUBCUTANEOUS at 12:26

## 2023-01-15 RX ADMIN — HEPARIN SODIUM 5000 UNITS: 5000 INJECTION INTRAVENOUS; SUBCUTANEOUS at 14:11

## 2023-01-15 RX ADMIN — SIMETHICONE 80 MG: 80 TABLET, CHEWABLE ORAL at 21:12

## 2023-01-15 RX ADMIN — Medication 1 TABLET: at 08:07

## 2023-01-15 RX ADMIN — INSULIN GLARGINE 10 UNITS: 100 INJECTION, SOLUTION SUBCUTANEOUS at 21:13

## 2023-01-15 RX ADMIN — PANTOPRAZOLE SODIUM 40 MG: 40 TABLET, DELAYED RELEASE ORAL at 06:05

## 2023-01-15 RX ADMIN — OXYCODONE HYDROCHLORIDE 10 MG: 10 TABLET ORAL at 21:12

## 2023-01-15 RX ADMIN — PANTOPRAZOLE SODIUM 40 MG: 40 TABLET, DELAYED RELEASE ORAL at 18:01

## 2023-01-15 RX ADMIN — OXYCODONE HYDROCHLORIDE 5 MG: 5 TABLET ORAL at 12:54

## 2023-01-15 RX ADMIN — ERTAPENEM SODIUM 1000 MG: 1 INJECTION, POWDER, LYOPHILIZED, FOR SOLUTION INTRAMUSCULAR; INTRAVENOUS at 21:29

## 2023-01-15 RX ADMIN — AMLODIPINE BESYLATE 5 MG: 5 TABLET ORAL at 17:57

## 2023-01-15 RX ADMIN — INSULIN LISPRO 4 UNITS: 100 INJECTION, SOLUTION INTRAVENOUS; SUBCUTANEOUS at 12:26

## 2023-01-15 RX ADMIN — INSULIN LISPRO 4 UNITS: 100 INJECTION, SOLUTION INTRAVENOUS; SUBCUTANEOUS at 08:29

## 2023-01-15 RX ADMIN — CHOLECALCIFEROL TAB 10 MCG (400 UNIT) 400 UNITS: 10 TAB at 08:08

## 2023-01-15 RX ADMIN — SIMETHICONE 80 MG: 80 TABLET, CHEWABLE ORAL at 08:07

## 2023-01-15 RX ADMIN — HEPARIN SODIUM 5000 UNITS: 5000 INJECTION INTRAVENOUS; SUBCUTANEOUS at 05:06

## 2023-01-15 RX ADMIN — INSULIN LISPRO 1 UNITS: 100 INJECTION, SOLUTION INTRAVENOUS; SUBCUTANEOUS at 21:14

## 2023-01-15 RX ADMIN — ACETAMINOPHEN 975 MG: 325 TABLET, FILM COATED ORAL at 05:05

## 2023-01-15 RX ADMIN — SIMETHICONE 80 MG: 80 TABLET, CHEWABLE ORAL at 17:57

## 2023-01-15 RX ADMIN — AMLODIPINE BESYLATE 5 MG: 5 TABLET ORAL at 08:07

## 2023-01-15 RX ADMIN — HEPARIN SODIUM 5000 UNITS: 5000 INJECTION INTRAVENOUS; SUBCUTANEOUS at 21:12

## 2023-01-15 RX ADMIN — ASPIRIN 81 MG CHEWABLE TABLET 81 MG: 81 TABLET CHEWABLE at 08:07

## 2023-01-15 RX ADMIN — ACETAMINOPHEN 975 MG: 325 TABLET, FILM COATED ORAL at 14:11

## 2023-01-15 RX ADMIN — METHOCARBAMOL 500 MG: 500 TABLET ORAL at 08:07

## 2023-01-15 RX ADMIN — ATORVASTATIN CALCIUM 40 MG: 40 TABLET, FILM COATED ORAL at 08:07

## 2023-01-15 RX ADMIN — COLLAGENASE SANTYL: 250 OINTMENT TOPICAL at 14:13

## 2023-01-15 RX ADMIN — TAMSULOSIN HYDROCHLORIDE 0.4 MG: 0.4 CAPSULE ORAL at 18:01

## 2023-01-15 RX ADMIN — MELATONIN TAB 3 MG 3 MG: 3 TAB at 21:39

## 2023-01-15 RX ADMIN — ACETAMINOPHEN 975 MG: 325 TABLET, FILM COATED ORAL at 21:12

## 2023-01-15 RX ADMIN — INSULIN LISPRO 4 UNITS: 100 INJECTION, SOLUTION INTRAVENOUS; SUBCUTANEOUS at 18:05

## 2023-01-15 RX ADMIN — OXYCODONE HYDROCHLORIDE 5 MG: 5 TABLET ORAL at 05:05

## 2023-01-15 RX ADMIN — METHOCARBAMOL 500 MG: 500 TABLET ORAL at 17:57

## 2023-01-15 NOTE — PROGRESS NOTES
PM&R Progress Note:     Subjective: Patient seen in PT today  Feeling well overall and encouraged as he was able to stand for the first time without using any assistive device  Hopeful that he can get home this week  Requesting to speak to palliative service in order for symptom management at home  Discussed blood transfusion with patient and his hemoglobin is 7 3 -patient is agreeable  Denies fevers or chills  Has been afebrile  Overall pain in abdomen and from thoracentesis site is improved    Objective:   /84 (BP Location: Right arm)   Pulse 78   Temp 98 7 °F (37 1 °C) (Oral)   Resp 20   Ht 5' 10" (1 778 m)   Wt 85 2 kg (187 lb 13 3 oz)   SpO2 99%   BMI 26 95 kg/m²   Drain #2 = 15 cc output today  Other drains 5-10 cc today       Assessment/Plan:  Patient stable, currently afebrile  Rehabilitation plan:  ? Current Function: With recent medical set-back requires Min A   Goals supervision - Mod I   ? Progressing in rehabilitation program  ? Dispo: Probable discharge home this week with home care     Medical Plan:    Acute on chronic anemia:   · Patient agreeable to transfusion  Transfuse 1 unit PRBC today  Already consented prior  Metastatic colon cancer to liver:   · Patient requesting to speak to palliative service regarding home symptom management -consult placed    Cholecystitis:   · Perc Alvina tube stable    Sepsis/Intra-abdominal abscess:   ? Patient s/p IR tube checks and thoracentesis 1/12/23   ? Tube #2 exchanged  ? 300 cc clear fluid from thoracentesis today from left pleural space  ? CBC/diff trending down  -recheck in a m   ? Afebrile   ? Continue Ertapenum 5 days post new drain (1/17/23) infectious disease services following  ? Blood cultures from 1/10/2023 no growth at 72 hours  ? Pleural fluid culture with no growth  ?  Case management did confirm IV Ertapenem can be ordered for home through the home care company if needed    Attempted to call patient's wife to provide medical and functional update -  left message on machine 1/14/2023    Marimar Ramírez MD  PM&R

## 2023-01-15 NOTE — PROGRESS NOTES
01/15/23 1230   Pain Assessment   Pain Assessment Tool 0-10   Pain Score 8   Pain Location/Orientation Location: Abdomen   Restrictions/Precautions   Precautions Fall Risk;Contact/isolation;Multiple lines;Supervision on toilet/commode   Subjective   Subjective pt agreeable to perform skilled PT   Roll Left and Right   Type of Assistance Needed Supervision   Roll Left and Right CARE Score 4   Sit to Lying   Type of Assistance Needed Supervision   Sit to Lying CARE Score 4   Lying to Sitting on Side of Bed   Type of Assistance Needed Supervision   Lying to Sitting on Side of Bed CARE Score 4   Sit to Stand   Type of Assistance Needed Supervision   Sit to Stand CARE Score 4   Bed-Chair Transfer   Type of Assistance Needed Supervision   Comment rw   Chair/Bed-to-Chair Transfer CARE Score 4   Transfer Bed/Chair/Wheelchair   Adaptive Equipment Roller Walker   Walk 10 Feet   Type of Assistance Needed Incidental touching; Adaptive equipment   Comment RW   Walk 10 Feet CARE Score 4   Ambulation   Primary Mode of Locomotion Prior to Admission Walk   Distance Walked (feet) 20 ft   Assist Device Roller Walker   Does the patient walk? 2  Yes   Equipment Use   NuStep LVL 1 FOR 10 MIN   Assessment   Treatment Assessment Focus on bed mobility , with short walks with RW 20 feet and pt perform Nu Step for 10 min lvl 2 LE only progressing better limited this session for 30 min and pt c/o being fatigue and a little tired  Pt back to bed and all needs in reach  Cont POC this PM session   Barriers to Discharge Decreased caregiver support   Recommendation   PT Discharge Recommendation Home with home health rehabilitation   PT Therapy Minutes   PT Time In 1230   PT Time Out 1310   PT Total Time (minutes) 40   PT Mode of treatment - Individual (minutes) 30   PT Mode of treatment - Concurrent (minutes) 10   PT Mode of treatment - Group (minutes) 0   PT Mode of treatment - Co-treat (minutes) 0   PT Mode of Treatment - Total time(minutes) 40 minutes   PT Cumulative Minutes 1952   Therapy Time missed   Time missed?  No

## 2023-01-15 NOTE — PROGRESS NOTES
01/15/23 1430   Pain Assessment   Pain Assessment Tool 0-10   Pain Score 5   Pain Location/Orientation Location: Abdomen   Restrictions/Precautions   Precautions Fall Risk;Contact/isolation;Multiple lines;Supervision on toilet/commode   Subjective   Subjective Pt reports being tried but agreeable to perform skilled PT   Roll Left and Right   Type of Assistance Needed Supervision   Roll Left and Right CARE Score 4   Sit to Lying   Type of Assistance Needed Supervision   Sit to Lying CARE Score 4   Lying to Sitting on Side of Bed   Type of Assistance Needed Supervision   Lying to Sitting on Side of Bed CARE Score 4   Sit to Stand   Type of Assistance Needed Supervision   Sit to Stand CARE Score 4   Bed-Chair Transfer   Type of Assistance Needed Supervision   Chair/Bed-to-Chair Transfer CARE Score 4   Transfer Bed/Chair/Wheelchair   Adaptive Equipment Roller Walker   Car Transfer   Type of Assistance Needed Supervision; Adaptive equipment   Comment RW SPT   Car Transfer CARE Score 4   Walk 10 Feet   Type of Assistance Needed Supervision; Adaptive equipment   Comment RW unsteady LE   Walk 10 Feet CARE Score 4   Walk 50 Feet with Two Turns   Type of Assistance Needed Supervision; Adaptive equipment   Comment RW   Walk 50 Feet with Two Turns CARE Score 4   Walking 10 Feet on Uneven Surfaces   Type of Assistance Needed Incidental touching; Adaptive equipment   Comment floor mat RW   Walking 10 Feet on Uneven Surfaces CARE Score 4   Ambulation   Primary Mode of Locomotion Prior to Admission Walk   Distance Walked (feet) 50 ft   Assist Device Roller Walker   Does the patient walk? 2  Yes   Wheel 50 Feet with Two Turns   Type of Assistance Needed Independent   Wheel 50 Feet with Two Turns CARE Score 6   Wheel 150 Feet   Type of Assistance Needed Independent   Wheel 150 Feet CARE Score 6   Wheelchair mobility   Does the patient use a wheelchair? 1  Yes   Type of Wheelchair Used 1  Manual   Method Right upper extremity; Left upper extremity   Picking Up Object   Type of Assistance Needed Supervision; Adaptive equipment   Comment reacher marker with RW   Picking Up Object CARE Score 4   Therapeutic Interventions   Strengthening LAQ AP marching 20 reps STS x5   Flexibility HS and Calfs manual stretch   Balance standing with reacher   Equipment Use   NuStep lvl 1 for 10 min   Assessment   Treatment Assessment Pt a little better this PM session but c/o being tired able to ambulate with RW longer S lvl and inc strengthening condition  Pt also perform thex ex's for LE strengthening  Pt will cont to need skilled PT and pt supine with all needs in reach   Cont POC   Barriers to Discharge Decreased caregiver support   Plan   Progress Slow progress, decreased activity tolerance   Recommendation   PT Discharge Recommendation Home with home health rehabilitation   PT Therapy Minutes   PT Time In 1430   PT Time Out 1530   PT Total Time (minutes) 60   PT Mode of treatment - Individual (minutes) 60   PT Mode of treatment - Concurrent (minutes) 0   PT Mode of treatment - Group (minutes) 0   PT Mode of treatment - Co-treat (minutes) 0   PT Mode of Treatment - Total time(minutes) 60 minutes   PT Cumulative Minutes 1952   Therapy Time missed   Time missed?  No

## 2023-01-15 NOTE — NUTRITION
01/15/23 1302   Recommendations/Interventions   Recommendations to Provider calorie count results: 1/13 consumed 1294kcal (60% estimated energy needs), 56g protein (65% estimated protein needs)  1/14 consumed 1912kcal (90% estimated energy needs), 63g protein (75% estimated protein needs)   D/C'd calorie count - pt meeting 60-90% estimated needs

## 2023-01-15 NOTE — PROGRESS NOTES
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  • s/p hepatic resection and reversal of colostomy on 11/7  • s/p right hemicolectomy with partial descending colectomy colocolonic anastomosis with loop ileostomy and mid line abdominal VAC placement 11/16  • WC following  • CT  Abd/pelvis 12/17 = loculated collection along splenic recess   Has perisplenic hilum collection as well --> to IR 12/20 for drain placement in both perigastric and perisplenic area and cx sent from both areas = Ecoli  • Flush drains with 10ml qd;   • S-O following/Dr Peyton Eli   • Tube check (incl perc radha tube) done 1/3 = unchanged with still some fluid around each drain so kept in     • On 1/10/23, WBC jumped, febrile to 102 4 --> Ertapenem restarted  CT A/P showed a slightly enlarging perihepatic collection near segment 3 --> #2 drain upsized 1/12/23 and drained 40ml purulent fluid  • Next tube check for @1/26/23 with potential to remove left JPs #3 and #4  • BCs 1/10 = NG      Acute cholecystitis  • s/p percutaneous tube placement 12/8 with tube check on 12/12, 12/14  • GI saw due to alk phos elevation = felt 2/2 infiltrative disease rather than focal biliary obstruction   AMA was negative     • Alkaline phosphatase isoenzyme sent 12/17 (48% liver/52% bone) = GI  recommended continuing to monitor his LFTs and defer to surgical oncology for further w/u and plan      • TB normalized  • GI wrote note 1/4 = aware labs incl GGT of 1992 (previously 2891 on 12/17)     • Per GI, they wanted cholangiogram with next tube check since wasn't done 1/3 but d/w Dr Peyton Eli and he did not want done with tube check 1/12/23      Bacteremia/abdominal abscess  • ID following   • s/p Ertapenem was switched to Doxycycline per ID as of 12/30 but had to be switched back 2/2 nausea  • Ertapenem restarted 1/10/23 needs 5 days additional after manipulation of drains through 1/17  • Wbc normal/no leukocytosis     Hypertension  • On Norvasc 5mg BID  • stable     Diabetes mellitus  • Home:  Glargine 35U qam/Glyburide-Metformin 5-500 qam/Januvia 100mg qam  • Here:  Lantus 10U qhs/Lispro 4U TID  • If he does not eat = hold Lispro WM dose and just cover with SSI  • Has a DEXCOM meter at home  • Continue DM diet and QID Accuchecks/SSI  • stable     Acute on chronic renal failure  • Stage III; baseline 1 2-1 4  • Lisinopril currently on hold  • Creat improved s/p IVF       Anemia  • Multifactorial due to recent infection, surgery, CKD stage III  • Transfused on 12/15, 12/16/22 and 12/30  • Hemoglobin 7 3 today he is not interested in transfusion     Atypical chest pain  • With elevation in troponin/EKG changes on acute side  • Cardiology saw then and cleared pt for discharge  • Did not recommend any further work up  • No further chest pain     Situational depression  • Neuropsychology consulted  • Patient not interested in medications at this point in time     Malnutrition  • Does not like supplements  • Currently on calorie count     Left pleural effusion  • CXR on 12/14 showed small left pleural effusion and was suspect for CHF  • ECHO 11/9/22 = LVEF 55%, unable to assess diastolic function, mild TR  • CT on 1/10 showed mod-lg left pl effusion and had thoracentesis 1/2  • Cyto/labs pending from pleural fluid        Discharge date:  TBA    The above assessment and plan was reviewed and updated as determined by my evaluation of the patient on 1/15/2023      Labs:   Results from last 7 days   Lab Units 01/14/23  0621 01/13/23  0530   WBC Thousand/uL 8 26 10 94*   HEMOGLOBIN g/dL 7 3* 7 4*   HEMATOCRIT % 23 5* 23 7*   PLATELETS Thousands/uL 322 362     Results from last 7 days   Lab Units 01/14/23  0621 01/12/23  0452   SODIUM mmol/L 139 136   POTASSIUM mmol/L 3 6 3 9   CHLORIDE mmol/L 111* 107   CO2 mmol/L 21 20*   BUN mg/dL 14 16   CREATININE mg/dL 1 09 1 00   CALCIUM mg/dL 8 4 8 5             Results from last 7 days   Lab Units 01/15/23  0624 01/14/23 2104 01/14/23  1550   POC GLUCOSE mg/dl 97 180* 160*       Review of Scheduled Meds:  Current Facility-Administered Medications   Medication Dose Route Frequency Provider Last Rate   • acetaminophen  325 mg Oral Q4H PRN Mateo Hawkins MD     • acetaminophen  975 mg Oral Q8H [facility] GAURANG Keith     • amLODIPine  5 mg Oral BID GAURANG Keith     • aspirin  81 mg Oral Daily GAURANG Keith     • atorvastatin  40 mg Oral Daily GAURANG Keith     • calcium carbonate  500 mg Oral BID PRN GAURANG Glover     • cholecalciferol  400 Units Oral Daily GAURANG Glover     • collagenase   Topical Daily Mateo Hawkins MD     • ertapenem  1,000 mg Intravenous Q24H Kaleb Zaragoza MD 1,000 mg (01/14/23 2122)   • heparin (porcine)  5,000 Units Subcutaneous Q8H [facility] GAURANG Keith     • insulin glargine  10 Units Subcutaneous HS GAURANG George     • insulin lispro  1-5 Units Subcutaneous HS GAURANG Keith     • insulin lispro  1-6 Units Subcutaneous TID AC GAURANG Keith     • insulin lispro  4 Units Subcutaneous TID With Meals GAURANG George     • iohexol  35 mL Oral 90 min pre-procedure GAURANG George     • melatonin  3 mg Oral HS GAURANG Keith     • methocarbamol  500 mg Oral BID GAURANG Glover     • multivitamin-minerals  1 tablet Oral Daily GAURANG Keith     • ondansetron  4 mg Oral Q6H PRN GAURANG Keith     • ondansetron  4 mg Oral BID GAURANG eGorge     • oxyCODONE  10 mg Oral Q6H PRN GAURANG Glover     • oxyCODONE  2 5 mg Oral Q3H PRN GAURANG Glover     • oxyCODONE  5 mg Oral Q6H PRN GAURANG Keith     • pantoprazole  40 mg Oral BID AC GAURANG Keith     • simethicone  80 mg Oral 4x Daily (with meals and at bedtime) GAURANG Lopez     • tamsulosin  0 4 mg Oral Daily With Dinner GAURANG Lopez         Subjective/ HPI: Patient seen and examined  Patients overnight issues or events were reviewed with nursing or staff during rounds or morning huddle session  New or overnight issues include the following:     No new or overnight issues  States that he slept well again and did well in therapy yesterday, today he is a little sore  ROS:   A 10 point ROS was performed; negative except as noted above  Imaging:     IR drainage tube check/change/reposition/reinsertion/upsize   Final Result by Jerry Mccauley MD (01/12 1646)   Impression:   1  Drain check demonstrated all 3 drains to be patent  No fistula to the bowel  There are still small collections around each drain and therefore the drains were left in place except the midline drain #2 was replaced and upsized and did drain    approximately 40cc of purulent fluid  PLAN: Tubes to bag drainage  Return in 2 weeks for tube checks and possible removal of left sided tubes 3 and 4  Workstation performed: [id number]         IR IN-Patient Thoracentesis   Final Result by Jerry Mccauley MD (01/12 5282)   Impression:   1  Successful ultrasound-guided left thoracentesis yielding 300 cc of clear pleural fluid  Workstation performed: [id number]         CT abdomen pelvis w contrast   Final Result by Miquel Hammans, MD (01/11 9065)      1  Moderate to large left effusion with adjacent atelectasis  2  Interval worsening left hepatic postsurgical site fluid collection now measuring 4 2 x 3 2 cm  Correlate with output  3  Resolution of subtle splenic collection with catheter in place  Bilobed thick walled fluid collection is smaller inferior to this         4  Percutaneous cholecystostomy tube in place with persistent surrounding inflammatory changes  Unchanged subhepatic collection  5  Tumor involvement involving the right hepatic lobe appears slightly worsened  Workstation performed: [id number]         IR drainage tube check and/or removal   Final Result by Jessica Mata MD (01/04 1631)   Impression:   1  Drain check demonstrated all 3 drains to be patent  No fistula to the bowel  There are still small collections around each drain and therefore the drains were left in place  PLAN: Tubes to bag drainage  Return in 2 weeks for tube checks  Workstation performed: [id number]         IR drainage tube placement   Final Result by Davion Terry MD (12/20 2003)      Percutaneous placement of abscess drainage catheters into the perigastric and perisplenic abscesses using 10-French catheters  Plan:       - Catheters to bulb suction drainage    - Flush catheters with 10 mL normal saline daily    - Return in 2 weeks for drain check  Workstation performed: [id number]         CT abdomen pelvis w contrast   Final Result by Arlette Miranda MD (55/00 6473)      Status post cholecystostomy tube placement with decompression of the gallbladder  In addition, a catheter placed adjacent to the cut edge of the liver is increased position with near complete resolution of fluid collection  Loculated collection along the splenic recess of the lesser sac is unchanged in size  Additional perisplenic collection in the hilum and along the inferior pole are unchanged  Collection of fluid and gas in the left upper quadrant adjacent to left kidney also not significantly changed    No extravasated oral contrast             Workstation performed: [id number]         IR drainage tube check/change/reposition/reinsertion/upsize    (Results Pending)   IR cholecystostomy tube check and/or removal    (Results Pending)       *Labs /Radiology studies reviewed  *Medications reviewed and reconciled as needed  *Please refer to order section for additional ordered labs studies  *Case discussed with primary attending during morning huddle case rounds    Physical Examination:  Vitals:   Vitals:    01/14/23 1329 01/14/23 2212 01/15/23 0505 01/15/23 0600   BP: 126/76 123/82 137/83    BP Location: Left arm Right arm Right arm    Pulse: 72 77 82    Resp: 16 18 18    Temp: 98 3 °F (36 8 °C) 97 8 °F (36 6 °C) 98 8 °F (37 1 °C)    TempSrc: Oral Oral Oral    SpO2: 95% 97% 97%    Weight:    85 2 kg (187 lb 13 3 oz)   Height:           GEN: No apparent distress, interactive; frail  NEURO: Alert and oriented x3  HEENT: Pupils are equal and reactive, EOMI, mucous membranes are moist, face symmetrical  CV: S1 S2 regular, no MRG, no peripheral edema noted  RESP: Lungs are clear bilaterally, no wheezes, rales or rhonchi noted, on room air, respirations easy and non labored  GI: Flat, soft non tender, non distended; +BS x4; ileostomy LUQ draining soft brown stool; cholecystectomy tube with bilious drainage present; VICTORINO drain x 3 all with purulent drainage noted, no redness around tube sites  : Voiding without difficulty  MUSC: Moves all extremities; generalized deconditioning  SKIN: pink, warm and dry, normal turgor, abdominal dressing intact; refer to media images          The above physical exam was reviewed and updated as determined by my evaluation of the patient on 1/15/2023  Invasive Devices     Central Venous Catheter Line  Duration           Port A Cath 03/10/22 Right Chest 310 days          Drain  Duration           Ileostomy LUQ 58 days    Cholecystostomy Tube 33 days    Abscess Drain Abdomen 25 days    Abscess Drain Back 25 days    Abscess Drain Abdomen 2 days                   VTE Pharmacologic Prophylaxis: Heparin  Code Status: Level 1 - Full Code  Current Length of Stay: 32 day(s)      Total time spent:  30 minutes with more than 50% spent counseling/coordinating care  Counseling includes discussion with patient re: progress  and discussion with patient of his/her current medical state/information  Coordination of patient's care was performed in conjunction with primary service  Time invested included review of patient's labs, vitals, and management of their comorbidities with continued monitoring  In addition, this patient was discussed with medical team including physician and advanced extenders  The care of the patient was extensively discussed and appropriate treatment plan was formulated unique for this patient  Medical decision making for the day was made by supervising physician unless otherwise noted in their attestation statement  ** Please Note:  voice to text software may have been used in the creation of this document   Although proof errors in transcription or interpretation are a potential of such software**

## 2023-01-15 NOTE — PLAN OF CARE
Problem: Prexisting or High Potential for Compromised Skin Integrity  Goal: Skin integrity is maintained or improved  Description: INTERVENTIONS:  - Identify patients at risk for skin breakdown  - Assess and monitor skin integrity  - Assess and monitor nutrition and hydration status  - Monitor labs   - Assess for incontinence   - Turn and reposition patient  - Assist with mobility/ambulation  - Relieve pressure over bony prominences  - Avoid friction and shearing  - Provide appropriate hygiene as needed including keeping skin clean and dry  - Evaluate need for skin moisturizer/barrier cream  - Collaborate with interdisciplinary team   - Patient/family teaching  - Consider wound care consult   Outcome: Progressing     Problem: Potential for Falls  Goal: Patient will remain free of falls  Description: INTERVENTIONS:  - Educate patient/family on patient safety including physical limitations  - Instruct patient to call for assistance with activity   - Consult OT/PT to assist with strengthening/mobility   - Keep Call bell within reach  - Keep bed low and locked with side rails adjusted as appropriate  - Keep care items and personal belongings within reach  - Initiate and maintain comfort rounds  - Make Fall Risk Sign visible to staff  - Offer Toileting every 2-4 Hours, in advance of need  - Initiate/Maintain bed/chair alarm  - Obtain necessary fall risk management equipment: nonskid footwear   - Apply yellow socks and bracelet for high fall risk patients  - Consider moving patient to room near nurses station  Outcome: Progressing     Problem: PAIN - ADULT  Goal: Verbalizes/displays adequate comfort level or baseline comfort level  Description: Interventions:  - Encourage patient to monitor pain and request assistance  - Assess pain using appropriate pain scale  - Administer analgesics based on type and severity of pain and evaluate response  - Implement non-pharmacological measures as appropriate and evaluate response  - Consider cultural and social influences on pain and pain management  - Notify physician/advanced practitioner if interventions unsuccessful or patient reports new pain  Outcome: Progressing     Problem: INFECTION - ADULT  Goal: Absence or prevention of progression during hospitalization  Description: INTERVENTIONS:  - Assess and monitor for signs and symptoms of infection  - Monitor lab/diagnostic results  - Monitor all insertion sites, i e  indwelling lines, tubes, and drains  - Monitor endotracheal if appropriate and nasal secretions for changes in amount and color  - Nimitz appropriate cooling/warming therapies per order  - Administer medications as ordered  - Instruct and encourage patient and family to use good hand hygiene technique  - Identify and instruct in appropriate isolation precautions for identified infection/condition  Outcome: Progressing     Problem: SAFETY ADULT  Goal: Patient will remain free of falls  Description: INTERVENTIONS:  - Educate patient/family on patient safety including physical limitations  - Instruct patient to call for assistance with activity   - Consult OT/PT to assist with strengthening/mobility   - Keep Call bell within reach  - Keep bed low and locked with side rails adjusted as appropriate  - Keep care items and personal belongings within reach  - Initiate and maintain comfort rounds  - Make Fall Risk Sign visible to staff  - Offer Toileting every 2-4 Hours, in advance of need  - Initiate/Maintain bed/chair alarm  - Obtain necessary fall risk management equipment: nonskid footwear  - Apply yellow socks and bracelet for high fall risk patients  - Consider moving patient to room near nurses station  Outcome: Progressing  Goal: Maintain or return to baseline ADL function  Description: INTERVENTIONS:  -  Assess patient's ability to carry out ADLs; assess patient's baseline for ADL function and identify physical deficits which impact ability to perform ADLs (bathing, care of mouth/teeth, toileting, grooming, dressing, etc )  - Assess/evaluate cause of self-care deficits   - Assess range of motion  - Assess patient's mobility; develop plan if impaired  - Assess patient's need for assistive devices and provide as appropriate  - Encourage maximum independence but intervene and supervise when necessary  - Involve family in performance of ADLs  - Assess for home care needs following discharge   - Consider OT consult to assist with ADL evaluation and planning for discharge  - Provide patient education as appropriate  Outcome: Progressing  Goal: Maintains/Returns to pre admission functional level  Description: INTERVENTIONS:  - Perform BMAT or MOVE assessment daily    - Set and communicate daily mobility goal to care team and patient/family/caregiver  - Collaborate with rehabilitation services on mobility goals if consulted  - Perform Range of Motion 3 times a day  - Reposition patient every 2 hours    - Dangle patient 3 times a day  - Stand patient 3 times a day  - Ambulate patient 3 times a day  - Out of bed to chair 3 times a day   - Out of bed for meals 3 times a day  - Out of bed for toileting  - Record patient progress and toleration of activity level   Outcome: Progressing     Problem: DISCHARGE PLANNING  Goal: Discharge to home or other facility with appropriate resources  Description: INTERVENTIONS:  - Identify barriers to discharge w/patient and caregiver  - Arrange for needed discharge resources and transportation as appropriate  - Identify discharge learning needs (meds, wound care, etc )  - Arrange for interpretive services to assist at discharge as needed  - Refer to Case Management Department for coordinating discharge planning if the patient needs post-hospital services based on physician/advanced practitioner order or complex needs related to functional status, cognitive ability, or social support system  Outcome: Progressing     Problem: Nutrition/Hydration-ADULT  Goal: Nutrient/Hydration intake appropriate for improving, restoring or maintaining nutritional needs  Description: Monitor and assess patient's nutrition/hydration status for malnutrition  Collaborate with interdisciplinary team and initiate plan and interventions as ordered  Monitor patient's weight and dietary intake as ordered or per policy  Utilize nutrition screening tool and intervene as necessary  Determine patient's food preferences and provide high-protein, high-caloric foods as appropriate       INTERVENTIONS:  - Monitor oral intake, urinary output, labs, and treatment plans  - Assess nutrition and hydration status and recommend course of action  - Evaluate amount of meals eaten  - Assist patient with eating if necessary   - Allow adequate time for meals  - Recommend/ encourage appropriate diets, oral nutritional supplements, and vitamin/mineral supplements  - Order, calculate, and assess calorie counts as needed  - Recommend, monitor, and adjust tube feedings and TPN/PPN based on assessed needs  - Assess need for intravenous fluids  - Provide specific nutrition/hydration education as appropriate  - Include patient/family/caregiver in decisions related to nutrition  Outcome: Progressing     Problem: MOBILITY - ADULT  Goal: Maintain or return to baseline ADL function  Description: INTERVENTIONS:  -  Assess patient's ability to carry out ADLs; assess patient's baseline for ADL function and identify physical deficits which impact ability to perform ADLs (bathing, care of mouth/teeth, toileting, grooming, dressing, etc )  - Assess/evaluate cause of self-care deficits   - Assess range of motion  - Assess patient's mobility; develop plan if impaired  - Assess patient's need for assistive devices and provide as appropriate  - Encourage maximum independence but intervene and supervise when necessary  - Involve family in performance of ADLs  - Assess for home care needs following discharge   - Consider OT consult to assist with ADL evaluation and planning for discharge  - Provide patient education as appropriate  Outcome: Progressing  Goal: Maintains/Returns to pre admission functional level  Description: INTERVENTIONS:  - Perform BMAT or MOVE assessment daily    - Set and communicate daily mobility goal to care team and patient/family/caregiver  - Collaborate with rehabilitation services on mobility goals if consulted  - Perform Range of Motion 3 times a day  - Reposition patient every 2 hours    - Dangle patient 3 times a day  - Stand patient 3 times a day  - Ambulate patient 3 times a day  - Out of bed to chair 3 times a day   - Out of bed for meals 3 times a day  - Out of bed for toileting  - Record patient progress and toleration of activity level   Outcome: Progressing

## 2023-01-15 NOTE — PROGRESS NOTES
01/15/23 0830   Pain Assessment   Pain Assessment Tool 0-10   Pain Score 4   Pain Location/Orientation Location: Abdomen   Restrictions/Precautions   Precautions Fall Risk;Contact/isolation;Multiple lines;Supervision on toilet/commode  (Dillan drain, ileostomy, IR Drain)   Weight Bearing Restrictions No   ROM Restrictions No   Lifestyle   Autonomy "Today is great so far"   Oral Hygiene   Type of Assistance Needed Independent   Physical Assistance Level No physical assistance   Comment seated in w/c at sink   Oral Hygiene CARE Score 6   Shower/Bathe Self   Type of Assistance Needed Physical assistance   Physical Assistance Level 25% or less   Comment SB at sink  Assist for lower legs and feet  pt continues to require WC for bathing to promote IND and overall safety  Shower/Bathe Self CARE Score 3   Upper Body Dressing   Type of Assistance Needed Physical assistance   Physical Assistance Level 25% or less   Comment Assist over trunk and partial assist w/ drain management   Upper Body Dressing CARE Score 3   Lower Body Dressing   Type of Assistance Needed Physical assistance   Physical Assistance Level 51%-75%   Comment Assist to thread BLE, patial assist in stance for CM over hips  Lower Body Dressing CARE Score 2   Putting On/Taking Off Footwear   Type of Assistance Needed Physical assistance   Physical Assistance Level Total assistance   Putting On/Taking Off Footwear CARE Score 1   Sit to Stand   Type of Assistance Needed Supervision   Physical Assistance Level No physical assistance   Comment CS to RW   Sit to Stand CARE Score 4   Bed-Chair Transfer   Type of Assistance Needed Supervision   Physical Assistance Level No physical assistance   Comment CS w/ RW   Chair/Bed-to-Chair Transfer CARE Score 4   Toileting Hygiene   Type of Assistance Needed Physical assistance   Physical Assistance Level 51%-75%   Comment Pt uses urinal seated in WC  Ileostomy emptied w/ assist to wipe opening     Frederick Ferris 83 Score 2   Exercise Tools   Theraband reviewed previously provided theraband UE HEP with pt demo'ing great recall of exercises, form and sets/reps  inc time to complete, rest breaks as needed to manage fatigue  HEP completed in order to inc/maintain strength and endurance for ADLs/transfers  Cognition   Overall Cognitive Status WFL   Arousal/Participation Alert; Cooperative   Attention Attends with cues to redirect   Orientation Level Oriented X4   Memory Within functional limits   Following Commands Follows one step commands with increased time or repetition   Comments Improved affect, pt more talkative today, Did not require redirection or additional emotional support this date  Pt talkative and telling jokes at end of session  Activity Tolerance   Activity Tolerance Patient limited by fatigue   Assessment   Treatment Assessment OT session focusing on basic ADLs, fxnl transfers, UE TE  Pt w/ improved affect and participation this date compared to recent OT sessions  Reports feeling the best this morning that he has lately  Despite recent progress pt continues to require skilled hands on assist in order to maintain his safety  OT to continue to treat and follow established POC  Problem List Decreased strength;Decreased range of motion; Impaired balance;Decreased endurance;Decreased mobility; Decreased coordination; Impaired sensation;Decreased skin integrity;Pain   Plan   Treatment/Interventions ADL retraining;Functional transfer training; Therapeutic exercise; Endurance training;Patient/family training;Equipment eval/education; Compensatory technique education;Continued evaluation   Progress Slow progress, decreased activity tolerance   OT Therapy Minutes   OT Time In 0830   OT Time Out 1000   OT Total Time (minutes) 90   OT Mode of treatment - Individual (minutes) 90   OT Mode of treatment - Concurrent (minutes) 0   OT Mode of treatment - Group (minutes) 0   OT Mode of treatment - Co-treat (minutes) 0   OT Mode of Treatment - Total time(minutes) 90 minutes   OT Cumulative Minutes 1993   Therapy Time missed   Time missed?  No

## 2023-01-16 LAB
ABO GROUP BLD BPU: NORMAL
ALBUMIN SERPL BCP-MCNC: 1.5 G/DL (ref 3.5–5)
ALP SERPL-CCNC: 1033 U/L (ref 46–116)
ALT SERPL W P-5'-P-CCNC: 12 U/L (ref 12–78)
ANION GAP SERPL CALCULATED.3IONS-SCNC: 6 MMOL/L (ref 4–13)
AST SERPL W P-5'-P-CCNC: 43 U/L (ref 5–45)
BACTERIA BLD CULT: NORMAL
BACTERIA BLD CULT: NORMAL
BASOPHILS # BLD AUTO: 0.03 THOUSANDS/ÂΜL (ref 0–0.1)
BASOPHILS NFR BLD AUTO: 0 % (ref 0–1)
BILIRUB SERPL-MCNC: 0.69 MG/DL (ref 0.2–1)
BPU ID: NORMAL
BUN SERPL-MCNC: 15 MG/DL (ref 5–25)
CALCIUM ALBUM COR SERPL-MCNC: 10.5 MG/DL (ref 8.3–10.1)
CALCIUM SERPL-MCNC: 8.5 MG/DL (ref 8.3–10.1)
CHLORIDE SERPL-SCNC: 109 MMOL/L (ref 96–108)
CO2 SERPL-SCNC: 20 MMOL/L (ref 21–32)
CREAT SERPL-MCNC: 1.12 MG/DL (ref 0.6–1.3)
CROSSMATCH: NORMAL
EOSINOPHIL # BLD AUTO: 0 THOUSAND/ÂΜL (ref 0–0.61)
EOSINOPHIL NFR BLD AUTO: 0 % (ref 0–6)
ERYTHROCYTE [DISTWIDTH] IN BLOOD BY AUTOMATED COUNT: 17.2 % (ref 11.6–15.1)
GFR SERPL CREATININE-BSD FRML MDRD: 72 ML/MIN/1.73SQ M
GLUCOSE SERPL-MCNC: 120 MG/DL (ref 65–140)
GLUCOSE SERPL-MCNC: 149 MG/DL (ref 65–140)
GLUCOSE SERPL-MCNC: 153 MG/DL (ref 65–140)
GLUCOSE SERPL-MCNC: 223 MG/DL (ref 65–140)
GLUCOSE SERPL-MCNC: 95 MG/DL (ref 65–140)
HCT VFR BLD AUTO: 28 % (ref 36.5–49.3)
HGB BLD-MCNC: 8.9 G/DL (ref 12–17)
HISTIOCYTES NFR FLD: 10 %
IMM GRANULOCYTES # BLD AUTO: 0.1 THOUSAND/UL (ref 0–0.2)
IMM GRANULOCYTES NFR BLD AUTO: 1 % (ref 0–2)
LYMPHOCYTES # BLD AUTO: 1.56 THOUSANDS/ÂΜL (ref 0.6–4.47)
LYMPHOCYTES NFR BLD AUTO: 15 % (ref 14–44)
LYMPHOCYTES NFR BLD AUTO: 64 %
MCH RBC QN AUTO: 26.5 PG (ref 26.8–34.3)
MCHC RBC AUTO-ENTMCNC: 31.8 G/DL (ref 31.4–37.4)
MCV RBC AUTO: 83 FL (ref 82–98)
MONOCYTES # BLD AUTO: 1.17 THOUSAND/ÂΜL (ref 0.17–1.22)
MONOCYTES NFR BLD AUTO: 11 % (ref 4–12)
MONOCYTES NFR BLD AUTO: 15 %
NEUTROPHILS # BLD AUTO: 7.66 THOUSANDS/ÂΜL (ref 1.85–7.62)
NEUTS SEG NFR BLD AUTO: 11 %
NEUTS SEG NFR BLD AUTO: 73 % (ref 43–75)
NRBC BLD AUTO-RTO: 0 /100 WBCS
PATHOLOGIST INTERPRETATION: NORMAL
PATHOLOGY REVIEW: YES
PLATELET # BLD AUTO: 350 THOUSANDS/UL (ref 149–390)
PMV BLD AUTO: 9.3 FL (ref 8.9–12.7)
POTASSIUM SERPL-SCNC: 3.6 MMOL/L (ref 3.5–5.3)
PROT SERPL-MCNC: 7.7 G/DL (ref 6.4–8.4)
RBC # BLD AUTO: 3.36 MILLION/UL (ref 3.88–5.62)
SODIUM SERPL-SCNC: 135 MMOL/L (ref 135–147)
TOTAL CELLS COUNTED SPEC: 100
UNIT DISPENSE STATUS: NORMAL
UNIT PRODUCT CODE: NORMAL
UNIT PRODUCT VOLUME: 350 ML
UNIT RH: NORMAL
WBC # BLD AUTO: 10.52 THOUSAND/UL (ref 4.31–10.16)

## 2023-01-16 RX ORDER — INSULIN LISPRO 100 [IU]/ML
4 INJECTION, SOLUTION INTRAVENOUS; SUBCUTANEOUS
Qty: 15 ML | Refills: 0 | Status: CANCELLED | OUTPATIENT
Start: 2023-01-16

## 2023-01-16 RX ORDER — ACETAMINOPHEN 325 MG/1
325 TABLET ORAL EVERY 4 HOURS PRN
Refills: 0 | Status: ON HOLD
Start: 2023-01-16

## 2023-01-16 RX ORDER — PEN NEEDLE, DIABETIC 32GX 5/32"
NEEDLE, DISPOSABLE MISCELLANEOUS
Qty: 100 EACH | Refills: 0 | Status: ON HOLD | OUTPATIENT
Start: 2023-01-16

## 2023-01-16 RX ORDER — OXYCODONE HYDROCHLORIDE 10 MG/1
TABLET ORAL
Qty: 40 TABLET | Refills: 0 | Status: SHIPPED | OUTPATIENT
Start: 2023-01-16 | End: 2023-01-20 | Stop reason: SDUPTHER

## 2023-01-16 RX ORDER — PEN NEEDLE, DIABETIC 32GX 5/32"
NEEDLE, DISPOSABLE MISCELLANEOUS
Qty: 100 EACH | Refills: 0 | Status: CANCELLED | OUTPATIENT
Start: 2023-01-16

## 2023-01-16 RX ORDER — SODIUM CHLORIDE 9 MG/ML
10 INJECTION INTRAVENOUS DAILY
Qty: 300 ML | Refills: 0 | Status: SHIPPED | OUTPATIENT
Start: 2023-01-16 | End: 2023-01-20 | Stop reason: SDUPTHER

## 2023-01-16 RX ORDER — INSULIN LISPRO 100 [IU]/ML
4 INJECTION, SOLUTION INTRAVENOUS; SUBCUTANEOUS
Qty: 15 ML | Refills: 0 | Status: SHIPPED | OUTPATIENT
Start: 2023-01-16 | End: 2023-01-20 | Stop reason: SDUPTHER

## 2023-01-16 RX ORDER — INSULIN GLARGINE 100 [IU]/ML
10 INJECTION, SOLUTION SUBCUTANEOUS
Qty: 45 ML | Refills: 0 | Status: CANCELLED | OUTPATIENT
Start: 2023-01-16

## 2023-01-16 RX ORDER — DULOXETIN HYDROCHLORIDE 30 MG/1
30 CAPSULE, DELAYED RELEASE ORAL DAILY
Status: DISCONTINUED | OUTPATIENT
Start: 2023-01-16 | End: 2023-01-17 | Stop reason: HOSPADM

## 2023-01-16 RX ORDER — INSULIN GLARGINE 100 [IU]/ML
10 INJECTION, SOLUTION SUBCUTANEOUS
Qty: 15 ML | Refills: 0 | Status: ON HOLD | OUTPATIENT
Start: 2023-01-16

## 2023-01-16 RX ORDER — SIMETHICONE 80 MG
80 TABLET,CHEWABLE ORAL
Qty: 30 TABLET | Refills: 0 | Status: SHIPPED | OUTPATIENT
Start: 2023-01-16 | End: 2023-01-20 | Stop reason: SDUPTHER

## 2023-01-16 RX ORDER — ONDANSETRON 4 MG/1
4 TABLET, ORALLY DISINTEGRATING ORAL 2 TIMES DAILY
Qty: 20 TABLET | Refills: 0 | Status: CANCELLED | OUTPATIENT
Start: 2023-01-16

## 2023-01-16 RX ORDER — DULOXETIN HYDROCHLORIDE 30 MG/1
30 CAPSULE, DELAYED RELEASE ORAL DAILY
Qty: 30 CAPSULE | Refills: 0 | Status: ON HOLD | OUTPATIENT
Start: 2023-01-17

## 2023-01-16 RX ORDER — ONDANSETRON 4 MG/1
4 TABLET, ORALLY DISINTEGRATING ORAL EVERY 6 HOURS PRN
Qty: 20 TABLET | Refills: 0 | Status: ON HOLD | OUTPATIENT
Start: 2023-01-16

## 2023-01-16 RX ADMIN — MELATONIN TAB 3 MG 3 MG: 3 TAB at 21:26

## 2023-01-16 RX ADMIN — HEPARIN SODIUM 5000 UNITS: 5000 INJECTION INTRAVENOUS; SUBCUTANEOUS at 21:27

## 2023-01-16 RX ADMIN — ACETAMINOPHEN 975 MG: 325 TABLET, FILM COATED ORAL at 13:38

## 2023-01-16 RX ADMIN — ASPIRIN 81 MG CHEWABLE TABLET 81 MG: 81 TABLET CHEWABLE at 08:51

## 2023-01-16 RX ADMIN — CHOLECALCIFEROL TAB 10 MCG (400 UNIT) 400 UNITS: 10 TAB at 08:52

## 2023-01-16 RX ADMIN — AMLODIPINE BESYLATE 5 MG: 5 TABLET ORAL at 08:51

## 2023-01-16 RX ADMIN — ACETAMINOPHEN 975 MG: 325 TABLET, FILM COATED ORAL at 06:12

## 2023-01-16 RX ADMIN — INSULIN LISPRO 4 UNITS: 100 INJECTION, SOLUTION INTRAVENOUS; SUBCUTANEOUS at 12:23

## 2023-01-16 RX ADMIN — SIMETHICONE 80 MG: 80 TABLET, CHEWABLE ORAL at 12:21

## 2023-01-16 RX ADMIN — ATORVASTATIN CALCIUM 40 MG: 40 TABLET, FILM COATED ORAL at 08:51

## 2023-01-16 RX ADMIN — PANTOPRAZOLE SODIUM 40 MG: 40 TABLET, DELAYED RELEASE ORAL at 06:13

## 2023-01-16 RX ADMIN — INSULIN LISPRO 2 UNITS: 100 INJECTION, SOLUTION INTRAVENOUS; SUBCUTANEOUS at 12:23

## 2023-01-16 RX ADMIN — TAMSULOSIN HYDROCHLORIDE 0.4 MG: 0.4 CAPSULE ORAL at 17:31

## 2023-01-16 RX ADMIN — METHOCARBAMOL 500 MG: 500 TABLET ORAL at 17:31

## 2023-01-16 RX ADMIN — HEPARIN SODIUM 5000 UNITS: 5000 INJECTION INTRAVENOUS; SUBCUTANEOUS at 06:12

## 2023-01-16 RX ADMIN — INSULIN LISPRO 4 UNITS: 100 INJECTION, SOLUTION INTRAVENOUS; SUBCUTANEOUS at 17:32

## 2023-01-16 RX ADMIN — SIMETHICONE 80 MG: 80 TABLET, CHEWABLE ORAL at 21:26

## 2023-01-16 RX ADMIN — AMLODIPINE BESYLATE 5 MG: 5 TABLET ORAL at 17:31

## 2023-01-16 RX ADMIN — PANTOPRAZOLE SODIUM 40 MG: 40 TABLET, DELAYED RELEASE ORAL at 17:37

## 2023-01-16 RX ADMIN — ERTAPENEM SODIUM 1000 MG: 1 INJECTION, POWDER, LYOPHILIZED, FOR SOLUTION INTRAMUSCULAR; INTRAVENOUS at 21:26

## 2023-01-16 RX ADMIN — HEPARIN SODIUM 5000 UNITS: 5000 INJECTION INTRAVENOUS; SUBCUTANEOUS at 13:39

## 2023-01-16 RX ADMIN — INSULIN GLARGINE 10 UNITS: 100 INJECTION, SOLUTION SUBCUTANEOUS at 21:27

## 2023-01-16 RX ADMIN — Medication 1 TABLET: at 08:51

## 2023-01-16 RX ADMIN — COLLAGENASE SANTYL: 250 OINTMENT TOPICAL at 11:47

## 2023-01-16 RX ADMIN — SIMETHICONE 80 MG: 80 TABLET, CHEWABLE ORAL at 08:51

## 2023-01-16 RX ADMIN — OXYCODONE HYDROCHLORIDE 5 MG: 5 TABLET ORAL at 03:30

## 2023-01-16 RX ADMIN — SIMETHICONE 80 MG: 80 TABLET, CHEWABLE ORAL at 17:31

## 2023-01-16 RX ADMIN — ACETAMINOPHEN 975 MG: 325 TABLET, FILM COATED ORAL at 21:26

## 2023-01-16 RX ADMIN — OXYCODONE HYDROCHLORIDE 10 MG: 10 TABLET ORAL at 21:26

## 2023-01-16 RX ADMIN — DULOXETINE 30 MG: 30 CAPSULE, DELAYED RELEASE ORAL at 12:21

## 2023-01-16 RX ADMIN — INSULIN LISPRO 4 UNITS: 100 INJECTION, SOLUTION INTRAVENOUS; SUBCUTANEOUS at 08:58

## 2023-01-16 RX ADMIN — METHOCARBAMOL 500 MG: 500 TABLET ORAL at 08:51

## 2023-01-16 NOTE — CONSULTS
Consultation - Palliative and Supportive Care   Kasey Dunn 61 y o  male [id number]    Patient Active Problem List   Diagnosis   • Type 2 diabetes mellitus without complication, with long-term current use of insulin (HCC)   • Benign essential hypertension   • Mixed hyperlipidemia   • Erectile dysfunction   • Low testosterone   • Obesity (BMI 30-39  9)   • Testicular hypogonadism   • Incarcerated umbilical hernia   • Left ureteral calculus   • Type 2 diabetes mellitus with hyperlipidemia (HCC)   • Microcytic anemia   • Transaminitis   • Thrombocytosis   • Iron deficiency anemia, unspecified   • Malignant neoplasm of transverse colon (HCC)   • 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)   • Colon cancer metastasized to liver [facility])   • Colostomy prolapse (HCC)   • Other fatigue   • Cervical radiculopathy   • Encephalopathy   • Flash pulmonary edema (HCC)   • MR (mitral regurgitation)   • Bacteremia   • ESBL (extended spectrum beta-lactamase) producing bacteria infection   • Acute respiratory failure with hypoxia (HCC)   • Severe protein-calorie malnutrition (HCC)   • Acute on chronic kidney failure (HCC)   • Sepsis (HCC)   • Abscess   • Acute pain   • Leukocytosis   • Dehiscence of incision   • Elevated alkaline phosphatase level     Active issues specifically addressed today include:  Metastatic colon cancer status post liver resection and multiple operative interventions now in acute rehab for 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  Neuropathic pain syndrome  Depression due to adjustment disorder  Existential distress  Palliative care patient    Plan:  1  Symptom management -we will trial Cymbalta 30 mg p o  daily to assist for both his depression and his neuropathic pain  Also asked social work team to follow-up to provide mental health counseling    He will need close follow-up with palliative care team after discharge and will ensure that he is seen by both provider and social worker at his visits  2  Goals -continue full cares with no limits  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  He does realize that this is a goal that will be very hard to attain and he is setting smaller goals to help get him the strength to get him there  3  Psychosocial support -prolong time was spent with patient providing supportive listening  All questions and concerns were addressed to his satisfaction       4  PSC [id number] will closely follow  Patient is scheduled to see my colleague at [facility] on the 18[facility], pending discharge time we may need to change his appointment  The patient want me to keep it at this time  Code Status: Full- Level 1   Decisional apparatus:  Patient is competent on my exam today  If competence is lost, patient's substitute decision maker would default to spouse by [place]  Advance Directive / Living Will / POLST: No     I have reviewed the patient's controlled substance dispensing history in the Prescription Drug Monitoring Program in compliance with the [facility] regulations before prescribing any controlled substances  We appreciate the invitation to be involved in this patient's care  We will follow  Please do not hesitate to reach our on call provider through our clinic answering service at  should you have acute symptom control concerns  Ezequiel Trevino DO  Palliative and Supportive Care  Clinic/Answering Service: [phone]  You can find me on [employer]!        IDENTIFICATION:  Inpatient consult to Palliative Care  Consult performed by: Ezequiel Trevino DO  Consult ordered by: Johnna Lee MD        Physician Requesting Consult: Johnna Lee MD  Reason for Consult / Principal Problem: Patient request  Hx and PE limited by: None    HISTORY OF PRESENT ILLNESS:       Alena Carrington is a 61 y o  male who we were initially consulted on during his acute stay in the hospital   Patient was admitted early November for exploratory laparotomy and resection of hepatic mass with known diagnosis of adenocarcinoma of the colon  Patient had complications resulting in several operative interventions  Patient ultimately stabilized and was able to be discharged to acute rehab on December 14  Patient has been doing well with his therapies and is currently working with occupational therapy  He tells me that he has been able to stand and walk with no assist devices and this he feels he is making steps for improvement  He actually requested palliative care consultation as he wanted to have a team that could follow him closely even after discharge  He is looking forward to discharge home in the next 48 hours, but is extremely nervous especially as he has several drains  His main complaint right now is neuropathic pain described as pins-and-needles in his feet  He states that he was tried on gabapentin early on but its been since discontinued  He has been trying to use therapies to improve his pain  He is very tearful during our discussion as he feels that even though he is making progress forward it slow and he is unsure if he is able to ever meet his goal   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  Discussed initiation of SNRI therapy to help with both adjustment and depression related to medical illness but also to assist with the neuropathic pain syndrome he is experiencing  Also discussed having social work support and he would very much appreciate this  Finally he requested that a member of our team is present at his discharge meeting so more people can hear and understand the plan of care moving forward  He plans to follow with palliative care as an [facility] the [facility]  Review of Systems   Constitutional: Positive for weight loss  Neurological: Positive for paresthesias and weakness  Psychiatric/Behavioral: Positive for depression  The patient is nervous/anxious  All other systems reviewed and are negative  Past Medical History:   Diagnosis Date   • Abdominal pain 03/12/2022   • Acute renal failure ([place] [address] )     [id number] resolved   • Cancer ([place] [address] )    • Diabetes mellitus ([place] [address] )    • Enteritis 08/23/2016   • Gastroparesis due to DM ([place] [address] ) 08/23/2016   • GERD (gastroesophageal reflux disease)    • Hernia, ventral 08/04/2016   • Hyperlipidemia    • Hypertension    • Morbid obesity ([place] [address] ) 04/17/2018   • Postoperative visit 03/02/2022   • SIRS (systemic inflammatory response syndrome) ([place] [address] ) 03/12/2022   • Snoring    • Stage 3a chronic kidney disease ([place] [address] ) 02/19/2022     Past Surgical History:   Procedure Laterality Date   • COLONOSCOPY     • COLOSTOMY N/A 02/20/2022    Procedure: COLOSTOMY LOOP, diverting;  Surgeon: Hanh Rios MD;  [facility];  Service: General   • ESOPHAGOGASTRODUODENOSCOPY N/A 08/24/2016    Procedure: ESOPHAGOGASTRODUODENOSCOPY (EGD); Surgeon: Kendrick Duran MD;  [facility];   Service:    • EXPLORATORY LAPAROTOMY W/ BOWEL RESECTION N/A 11/16/2022    Procedure: LAPAROTOMY EXPLORATORY W/ BOWEL RESECTION- VAC PLACEMENT;  Surgeon: Tina Leroy MD;  [facility];  Service: Surgical Oncology   • EXPLORATORY LAPAROTOMY W/ BOWEL RESECTION N/A 11/17/2022    Procedure: LAPAROTOMY EXPLORATORY W/ BOWEL RESECTION;  Surgeon: Tina Leroy MD;  [facility];  Service: Surgical Oncology   • ILEOSTOMY N/A 11/17/2022    Procedure: Theora Flow;  Surgeon: Tina Leroy MD;  [facility];  Service: Surgical Oncology   • ILEOSTOMY CLOSURE N/A 11/7/2022    Procedure: REVERSAL COLOSTOMY;  Surgeon: Dk Aldridge MD;  [facility];  Service: Surgical Oncology   • IR CHOLECYSTOSTOMY TUBE CHECK/CHANGE/REPOSITION/REINSERTION/UPSIZE  12/12/2022   • IR CHOLECYSTOSTOMY TUBE PLACEMENT  12/8/2022   • IR DRAINAGE TUBE CHECK AND/OR REMOVAL  1/3/2023 • IR DRAINAGE TUBE CHECK/CHANGE/REPOSITION/REINSERTION/UPSIZE  1/12/2023   • IR DRAINAGE TUBE PLACEMENT  12/8/2022   • IR DRAINAGE TUBE PLACEMENT  12/20/2022   • IR PORT PLACEMENT  03/10/2022   • IR THORACENTESIS  1/12/2023   • KIDNEY STONE SURGERY     • LIVER BIOPSY LAPAROSCOPIC N/A 02/20/2022    Procedure: DIAGNOSTIC LAPAROSCOPIC LIVER BIOPSY, DIVERTING LOOP COLOSTOMY ;  Surgeon: Rachana Parrish MD;  [facility];  Service: General   • LIVER LOBECTOMY N/A 11/7/2022    Procedure: SEGMENT 3 LIVER RESECTION, ABLATION, INTRAOPERATIVE U/S OF LIVER, PERITONEAL BIOPSY;  Surgeon: Wendy Ramirez MD;  [facility];  Service: Surgical Oncology   • RIGHT COLON RESECTION N/A 11/7/2022    Procedure: EX LAP, TRANVERSE COLON RESECTION;  Surgeon: Wendy Ramirez MD;  [facility];  Service: Surgical Oncology   • TONSILLECTOMY     • UMBILICAL HERNIA REPAIR LAPAROSCOPIC N/A 04/26/2019    Procedure: LAPAROSCOPIC UMBILICAL HERNIA REPAIR;  Surgeon: Rachana Parrish MD;  [facility];  Service: General   • VAC DRESSING APPLICATION N/A 98/08/2842    Procedure: APPLICATION VAC DRESSING ABDOMEN/TRUNK;  Surgeon: Chelsey Abdi MD;  [facility];  Service: Surgical Oncology     Social History     Socioeconomic History   • Marital status: /Civil Union     Spouse name: Not on file   • Number of children: Not on file   • Years of education: Not on file   • Highest education level: Not on file   Occupational History   • Occupation: ACTOR     Employer: [employer]   Tobacco Use   • Smoking status: Never   • Smokeless tobacco: Never   Vaping Use   • Vaping Use: Never used   Substance and Sexual Activity   • Alcohol use: Never   • Drug use: No   • Sexual activity: Yes     Partners: Female   Other Topics Concern   • Not on file   Social History Narrative   • Not on file     Social Determinants of Health     Financial Resource Strain: Not on file   Food Insecurity: No Food Insecurity   • Worried About Running Out of Food in the Last Year: Never true   • Ran Out of Food in the Last Year: Never true   Transportation Needs: No Transportation Needs   • Lack of Transportation (Medical): No   • Lack of Transportation (Non-Medical):  No   Physical Activity: Insufficiently Active   • Days of Exercise per Week: 5 days   • Minutes of Exercise per Session: 10 min   Stress: No Stress Concern Present   • Feeling of Stress : Not at all   Social Connections: Not on file   Intimate Partner Violence: Not on file   Housing Stability: Low Risk    • Unable to Pay for Housing in the Last Year: No   • Number of Places Lived in the Last Year: 1   • Unstable Housing in the Last Year: No     Family History   Problem Relation Age of Onset   • Diabetes Mother         mellitus   • Lung cancer Mother    • Coronary artery disease Father        MEDICATIONS / ALLERGIES:    all current active meds have been reviewed and current meds:   Current Facility-Administered Medications   Medication Dose Route Frequency   • acetaminophen (TYLENOL) tablet 325 mg  325 mg Oral Q4H PRN   • acetaminophen (TYLENOL) tablet 975 mg  975 mg Oral Q8H [address]   • amLODIPine (NORVASC) tablet 5 mg  5 mg Oral BID   • aspirin chewable tablet 81 mg  81 mg Oral Daily   • atorvastatin (LIPITOR) tablet 40 mg  40 mg Oral Daily   • calcium carbonate (TUMS) chewable tablet 500 mg  500 mg Oral BID PRN   • cholecalciferol (VITAMIN D3) tablet 400 Units  400 Units Oral Daily   • collagenase (SANTYL) ointment   Topical Daily   • DULoxetine (CYMBALTA) delayed release capsule 30 mg  30 mg Oral Daily   • ertapenem (INVanz) 1,000 mg in sodium chloride 0 9 % 50 mL IVPB  1,000 mg Intravenous Q24H   • heparin (porcine) subcutaneous injection 5,000 Units  5,000 Units Subcutaneous Q8H [address]   • insulin glargine (LANTUS) subcutaneous injection 10 Units 0 1 mL  10 Units Subcutaneous HS   • insulin lispro (HumaLOG) 100 units/mL subcutaneous injection 1-5 Units  1-5 Units Subcutaneous HS   • insulin lispro (HumaLOG) 100 units/mL subcutaneous injection 1-6 Units  1-6 Units Subcutaneous TID AC   • insulin lispro (HumaLOG) 100 units/mL subcutaneous injection 4 Units  4 Units Subcutaneous TID With Meals   • iohexol (OMNIPAQUE) 240 MG/ML solution 35 mL  35 mL Oral 90 min pre-procedure   • melatonin tablet 3 mg  3 mg Oral HS   • methocarbamol (ROBAXIN) tablet 500 mg  500 mg Oral BID   • multivitamin-minerals (CENTRUM) tablet 1 tablet  1 tablet Oral Daily   • ondansetron (ZOFRAN-ODT) dispersible tablet 4 mg  4 mg Oral Q6H PRN   • ondansetron (ZOFRAN-ODT) dispersible tablet 4 mg  4 mg Oral BID   • oxyCODONE (ROXICODONE) immediate release tablet 10 mg  10 mg Oral Q6H PRN   • oxyCODONE (ROXICODONE) IR tablet 2 5 mg  2 5 mg Oral Q3H PRN   • oxyCODONE (ROXICODONE) IR tablet 5 mg  5 mg Oral Q6H PRN   • pantoprazole (PROTONIX) EC tablet 40 mg  40 mg Oral BID AC   • simethicone (MYLICON) chewable tablet 80 mg  80 mg Oral 4x Daily (with meals and at bedtime)   • tamsulosin (FLOMAX) capsule 0 4 mg  0 4 mg Oral Daily With Dinner       Allergies   Allergen Reactions   • Shellfish-Derived Products - Food Allergy Anaphylaxis   • Erythromycin GI Intolerance       OBJECTIVE:    Physical Exam  Physical Exam  Vitals and nursing note reviewed  Constitutional:       General: He is not in acute distress  Appearance: He is ill-appearing  HENT:      Head: Normocephalic and atraumatic  Right Ear: External ear normal       Left Ear: External ear normal       Nose: Nose normal    Eyes:      General: No scleral icterus  Right eye: No discharge  Left eye: No discharge  Cardiovascular:      Rate and Rhythm: Normal rate and regular rhythm  Pulmonary:      Effort: Pulmonary effort is normal  No respiratory distress  Breath sounds: Normal breath sounds  Abdominal:      General: Bowel sounds are normal  There is no distension  Palpations: Abdomen is soft        Comments: 2 VICTORINO drains in place, one PERC radha in place   Skin:     General: Skin is warm and dry  Coloration: Skin is pale  Neurological:      Mental Status: He is alert and oriented to person, place, and time  Mental status is at baseline  Psychiatric:      Comments: Depressed mood         Lab Results: I have personally reviewed pertinent labs  , CBC: No results found for: WBC, HGB, HCT, MCV, PLT, ADJUSTEDWBC, MCH, MCHC, RDW, MPV, NRBC, CMP: No results found for: SODIUM, K, CL, CO2, ANIONGAP, BUN, CREATININE, GLUCOSE, CALCIUM, AST, ALT, ALKPHOS, PROT, BILITOT, EGFR, PT/PTT:No results found for: PT, PTT  Imaging Studies: Reviewed  EKG, Pathology, and Other Studies: Reviewed    Counseling / Coordination of Care    Total floor / unit time spent today 75+ minutes  Greater than 50% of total time was spent with the patient and / or family counseling and / or coordination of care  A description of the counseling / coordination of care: Chart review, medication review, medication adjustments, prolonged time spent providing supportive listening, discussion with rehab team     Portions of this document may have been created using dictation software and as such some "sound alike" terms may have been generated by the system  Do not hesitate to contact me with any questions or clarifications

## 2023-01-16 NOTE — PROGRESS NOTES
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  • s/p hepatic resection and reversal of colostomy on 11/7  • s/p right hemicolectomy with partial descending colectomy colocolonic anastomosis with loop ileostomy and mid line abdominal VAC placement 11/16  • CT  Abd/pelvis 12/17 = loculated collection along splenic recess   Has perisplenic hilum collection as well --> to IR 12/20 for drain placement in both perigastric and perisplenic area and cx sent from both areas = Ecoli  • Tube check (incl perc radha tube) 1/3 = unchanged with still some fluid around each drain so kept in     • On 1/10/23, WBC jumped, febrile to 102 4 --> Ertapenem restarted  BC NG 1/10  • CT A/P showed a slightly enlarging perihepatic collection near segment 3 --> #2 drain upsized 1/12/23 and drained 40ml purulent fluid  • Next tube check for @1/26/23 with potential to remove left JPs #3 and #4  • S-O and WC following     Acute cholecystitis  • s/p percutaneous tube placement 12/8 with tube check on 12/12, 12/14  • GI saw due to alk phos elevation = felt 2/2 infiltrative disease rather than focal biliary obstruction   AMA was negative     • Alkaline phosphatase isoenzyme sent 12/17 (48% liver/52% bone) = GI  recommended continuing to monitor his LFTs and defer to surgical oncology for further w/u and plan     • TB normalized  • GI wrote note 1/4 = aware labs incl GGT of 1992 (previously 5661 on 12/17)     • Per GI, they wanted cholangiogram with next tube check since wasn't done 1/3 but d/w Dr Melida Gomez and he did not want done with tube check 1/12/23      Bacteremia/abdominal abscess  • ID following   • s/p Ertapenem was switched to Doxycycline per ID as of 12/30 but had to be switched back 2/2 nausea  • Ertapenem restarted 1/10/23 needs 5 days additional after manipulation of drains through 1/17     Hypertension  • Stable on Norvasc 5mg BID  • No changes todaay     Diabetes mellitus  • Home:  Glargine 35U qam/Glyburide-Metformin 5-500 qam/Januvia 100mg qam  • Here:  Lantus 10U qhs/Lispro 4U TID  • If he does not eat = hold Lispro WM dose and just cover with SSI  • Has a DEXCOM meter at home  • Continue DM diet and QID Accuchecks/SSI  • Stable and has been eating well recently     Acute on chronic renal failure  • Stage III; baseline 1 2-1 4  • Lisinopril currently on hold  • Creat improved s/p IVF     Anemia  • Multifactorial due to recent infection, surgery, CKD stage III  • Transfused on 12/15, 12/16, 12/30, 1/15  • Feels better today after yesterdays transfusion     Atypical chest pain  • With elevation in troponin/EKG changes on acute side  • Cardiology saw and did not recommend any further work up  • No further chest pain     Situational depression  • Neuropsychology consulted  • Cymbalta started today 1/16 per Palliative care     Malnutrition  • Does not like supplements  • Germán ct 1/13 = 1294 calories     Left pleural effusion  • CXR on 12/14 showed small left pleural effusion and was suspect for CHF  • ECHO 11/9/22 = LVEF 55%, unable to assess diastolic function, mild TR  • CT on 1/10 showed mod-lg left pl effusion and had thoracentesis 1/2 for 300ml  • Cyto from pleural fluid was negative for malignancy; cx = NG        Discharge date:  TBA       The above assessment and plan was reviewed and updated as determined by my evaluation of the patient on 1/16/2023      Labs:   Results from last 7 days   Lab Units 01/16/23  1143 01/14/23  0621   WBC Thousand/uL 10 52* 8 26   HEMOGLOBIN g/dL 8 9* 7 3*   HEMATOCRIT % 28 0* 23 5*   PLATELETS Thousands/uL 350 322     Results from last 7 days   Lab Units 01/16/23  1143 01/14/23  0621   SODIUM mmol/L 135 139   POTASSIUM mmol/L 3 6 3 6   CHLORIDE mmol/L 109* 111*   CO2 mmol/L 20* 21   BUN mg/dL 15 14   CREATININE mg/dL 1 12 1 09   CALCIUM mg/dL 8 5 8 4             Results from last 7 days   Lab Units 01/16/23  1533 01/16/23  1021 01/16/23  0613   POC GLUCOSE mg/dl 149* 223* 95       Review of Scheduled Meds:  Current Facility-Administered Medications   Medication Dose Route Frequency Provider Last Rate   • acetaminophen  325 mg Oral Q4H PRN Иван Coon MD     • acetaminophen  975 mg Oral Q8H [address] GAURANG Keith     • amLODIPine  5 mg Oral BID GAURANG Keith     • aspirin  81 mg Oral Daily GAURANG Keith     • atorvastatin  40 mg Oral Daily GAURANG Keith     • calcium carbonate  500 mg Oral BID PRN GAURANG Jang     • cholecalciferol  400 Units Oral Daily GAURANG Jang     • collagenase   Topical Daily Иван Coon MD     • DULoxetine  30 mg Oral Daily Katerin Crawford DO     • ertapenem  1,000 mg Intravenous Q24H Vj Zarate MD 1,000 mg (01/15/23 2129)   • heparin (porcine)  5,000 Units Subcutaneous Q8H [address] GAURANG Keith     • insulin glargine  10 Units Subcutaneous HS GAURANG Carpenter     • insulin lispro  1-5 Units Subcutaneous HS GAURANG Keith     • insulin lispro  1-6 Units Subcutaneous TID AC GAURANG Keith     • insulin lispro  4 Units Subcutaneous TID With Meals GAURANG Carpenter     • iohexol  35 mL Oral 90 min pre-procedure GAURANG Carpenter     • melatonin  3 mg Oral HS GAURANG Keith     • methocarbamol  500 mg Oral BID GAURANG Jang     • multivitamin-minerals  1 tablet Oral Daily GAURANG Keith     • ondansetron  4 mg Oral Q6H PRN GAURANG Keith     • ondansetron  4 mg Oral BID GAURANG Carpenter     • oxyCODONE  10 mg Oral Q6H PRN GAURANG Jang     • oxyCODONE  2 5 mg Oral Q3H PRN GAURANG Jang     • oxyCODONE  5 mg Oral Q6H PRN GAURANG Keith     • pantoprazole  40 mg Oral BID AC GAURANG Keith     • simethicone  80 mg Oral 4x Daily (with meals and at bedtime) GAURANG Burton     • tamsulosin  0 4 mg Oral Daily With Dinner GAURANG Burton         Subjective/ HPI: Patient seen and examined  Patients overnight issues or events were reviewed with nursing or staff during rounds or morning huddle session  New or overnight issues include the following:     No new or overnight issues  Offers no complaints    ROS:   A 10 point ROS was performed; negative except as noted above  *Labs /Radiology studies reviewed  *Medications reviewed and reconciled as needed  *Please refer to order section for additional ordered labs studies  *Case discussed with primary attending during morning huddle case rounds    Physical Examination:  Vitals:   Vitals:    01/15/23 2126 01/16/23 0548 01/16/23 0851 01/16/23 1415   BP: 139/86 142/82 136/80 127/78   BP Location: Left arm Left arm  Left arm   Pulse: 84 72  (!) 110   Resp: 16 16  17   Temp: 98 1 °F (36 7 °C) 97 9 °F (36 6 °C)  97 6 °F (36 4 °C)   TempSrc: Oral Oral  Oral   SpO2: 100% 97%  99%   Weight:       Height:           General Appearance: no distress, conversive  HEENT:  External ear normal   Nose normal w/o drainage  Mucous membranes are moist  Oropharynx is clear  Conjunctiva clear w/o icterus or redness  Neck:  Supple, normal ROM  Lungs: BBS without crackles/wheeze/rhonchi; respirations unlabored with normal inspiratory/expiratory effort  No retractions noted  On RA  CV: regular rate and rhythm; no rubs/murmurs/gallops, PMI normal   ABD: Abdomen is soft  Bowel sounds all quadrants  Nontender with no distention  EXT: no edema  Skin: normal turgor, normal texture, no rashes  Psych: affect normal, mood normal  Neuro: AAO      The above physical exam was reviewed and updated as determined by my evaluation of the patient on 1/16/2023      Invasive Devices     Central Venous Catheter Line  Duration           Port A Cath 03/10/22 Right Chest 312 days          Drain Duration           Ileostomy LUQ 60 days    Cholecystostomy Tube 35 days    Abscess Drain Abdomen 26 days    Abscess Drain Back 26 days    Abscess Drain Abdomen 3 days                   VTE Pharmacologic Prophylaxis: Heparin  Code Status: Level 1 - Full Code  Current Length of Stay: 33 day(s)      Total time spent:  30 minutes with more than 50% spent counseling/coordinating care  Counseling includes discussion with patient re: progress  and discussion with patient of his/her current medical state/information  Coordination of patient's care was performed in conjunction with primary service  Time invested included review of patient's labs, vitals, and management of their comorbidities with continued monitoring  In addition, this patient was discussed with medical team including physician and advanced extenders  The care of the patient was extensively discussed and appropriate treatment plan was formulated unique for this patient  Medical decision making for the day was made by supervising physician unless otherwise noted in their attestation statement  ** Please Note:  voice to text software may have been used in the creation of this document   Although proof errors in transcription or interpretation are a potential of such software**

## 2023-01-16 NOTE — PROGRESS NOTES
01/16/23 0930   Pain Assessment   Pain Assessment Tool 0-10   Pain Score 7   Pain Location/Orientation   (upper drain site)   Restrictions/Precautions   Precautions Fall Risk;Contact/isolation;Multiple lines;Supervision on toilet/commode   Weight Bearing Restrictions No   ROM Restrictions No   Lifestyle   Autonomy "I feel a lot better than last week"   Sit to Stand   Type of Assistance Needed Supervision   Physical Assistance Level No physical assistance   Comment RW   Sit to Stand CARE Score 4   Bed-Chair Transfer   Type of Assistance Needed Supervision   Physical Assistance Level No physical assistance   Comment RW   Chair/Bed-to-Chair Transfer CARE Score 4   Cognition   Overall Cognitive Status WFL   Arousal/Participation Alert; Cooperative   Attention Attends with cues to redirect   Orientation Level Oriented X4   Memory Within functional limits   Following Commands Follows one step commands with increased time or repetition   Activity Tolerance   Activity Tolerance Patient tolerated treatment well   Assessment   Treatment Assessment pt engages in brief 30 minute skilled OT session focusing on STS and func transfers with RW  session briefly paused due to arrival of palliative care services present  plan to continue OT ADL session when conversation finished  continue OT POC  Prognosis Fair   Problem List Decreased strength;Decreased range of motion; Impaired balance;Decreased endurance;Decreased mobility; Decreased coordination; Impaired sensation;Decreased skin integrity;Pain   Barriers to Discharge Decreased caregiver support   Plan   Treatment/Interventions ADL retraining;Functional transfer training; Therapeutic exercise; Endurance training;Patient/family training;Equipment eval/education; Compensatory technique education   OT Therapy Minutes   OT Time In 0930   OT Time Out 1000   OT Total Time (minutes) 30   OT Mode of treatment - Individual (minutes) 30   OT Mode of treatment - Concurrent (minutes) 0   OT Mode of treatment - Group (minutes) 0   OT Mode of treatment - Co-treat (minutes) 0   OT Mode of Treatment - Total time(minutes) 30 minutes   OT Cumulative Minutes 1928   Therapy Time missed   Time missed?  No

## 2023-01-16 NOTE — CASE MANAGEMENT
Patient confirmed that the hospital bed was delivered and stated that he's ready for DC home  Physicians came in the room during discussion, will follow up with him later to further discuss DC tomorrow  Notified [id number] that he will be returning home tomorrow for [facility]  Update: [facility] confirmed [facility] for 1/18 or 1/19

## 2023-01-16 NOTE — PROGRESS NOTES
01/16/23 1400   Pain Assessment   Pain Assessment Tool 0-10   Pain Score No Pain   Restrictions/Precautions   Precautions Fall Risk;Contact/isolation;Supervision on toilet/commode   Weight Bearing Restrictions No   ROM Restrictions No   General   Change In Medical/Functional Status Patient to discharge tomorrow   Cognition   Overall Cognitive Status WFL   Subjective   Subjective "I'm going tomorrow!"   Sit to Lying   Type of Assistance Needed Supervision   Physical Assistance Level No physical assistance   Sit to Lying CARE Score 4   Lying to Sitting on Side of Bed   Type of Assistance Needed Supervision   Lying to Sitting on Side of Bed CARE Score 4   Sit to Stand   Type of Assistance Needed Supervision; Adaptive equipment   Physical Assistance Level No physical assistance   Sit to Stand CARE Score 4   Bed-Chair Transfer   Type of Assistance Needed Supervision; Adaptive equipment   Physical Assistance Level No physical assistance   Chair/Bed-to-Chair Transfer CARE Score 4   Transfer Bed/Chair/Wheelchair   Limitations Noted In Endurance;LE Strength   Adaptive Equipment Roller Walker   Findings Limited today by onset of dizziness at times with upright mobility   Car Transfer   Type of Assistance Needed Supervision; Adaptive equipment   Physical Assistance Level No physical assistance   Car Transfer CARE Score 4   Walk 10 Feet   Type of Assistance Needed Supervision; Adaptive equipment   Physical Assistance Level No physical assistance   Comment RW   Walk 10 Feet CARE Score 4   Walk 50 Feet with Two Turns   Type of Assistance Needed Supervision; Adaptive equipment   Physical Assistance Level No physical assistance   Comment RW   Walk 50 Feet with Two Turns CARE Score 4   Walk 150 Feet   Reason if not Attempted Safety concerns   Walk 150 Feet CARE Score 88   Ambulation   Primary Mode of Locomotion Prior to Admission Walk   Distance Walked (feet) 50 ft  (RW)   Assist Device Roller Walker   Gait Pattern Shuffle Limitations Noted In Balance; Endurance; Heel Strike;Speed;Strength;Swing   Does the patient walk? 2  Yes   Wheel 50 Feet with Two Turns   Type of Assistance Needed Independent   Physical Assistance Level No physical assistance   Wheel 50 Feet with Two Turns CARE Score 6   Wheel 150 Feet   Type of Assistance Needed Independent   Physical Assistance Level No physical assistance   Wheel 150 Feet CARE Score 6   Wheelchair mobility   Does the patient use a wheelchair? 1  Yes   Type of Wheelchair Used 1  Manual   Method Right upper extremity; Left upper extremity   Curb or Single Stair   Style negotiated Curb   Type of Assistance Needed Supervision; Adaptive equipment   Physical Assistance Level No physical assistance   Comment CS with RW   1 Step (Curb) CARE Score 4   4 Steps   Type of Assistance Needed Incidental touching   Physical Assistance Level No physical assistance   Comment 6" trianer steps with BL handrails; limited by dizziness   4 Steps CARE Score 4   12 Steps   Reason if not Attempted Safety concerns   12 Steps CARE Score 88   Stairs   Type Stairs   # of Steps 4   Weight Bearing Precautions Fall Risk   Assist Devices Bilateral Rail   Toilet Transfer   Findings patient empried ileostomy bag at the end of session with set up provided by therapist   Therapeutic Interventions   Balance 50' ambulation with light HHA and mod A for balance- not recommended to perform at home, continue with RW until more consistent; advised to progress with HHPT   Assessment   Treatment Assessment Patient made good progress towards his goals with skilled PT intervention  Patient is set for discharge home tomorrow  He was previously provided with HEP and is recommended to follow up with HHPT services  He is to use a w/c for distances and within home when needing to be (I)  He is to have S for walking with a RW and CGA on curb steps to enter     PT Therapy Minutes   PT Time In 1400   PT Time Out 1525   PT Total Time (minutes) 85   PT Mode of treatment - Individual (minutes) 85   PT Mode of treatment - Concurrent (minutes) 0   PT Mode of treatment - Group (minutes) 0   PT Mode of treatment - Co-treat (minutes) 0   PT Mode of Treatment - Total time(minutes) 85 minutes   PT Cumulative Minutes 2037

## 2023-01-16 NOTE — PROGRESS NOTES
PM&R PROGRESS NOTE:  Denisse Benavides 61 y o  male MRN: [id number]  Unit/Bed#: -32 Encounter: [id number]        Rehabilitation Diagnosis: Impairment of mobility, safety and Activities of Daily Living (ADLs) due to Neurologic Conditions:  03 8  Neuromuscular Disorders    HPI: Ashli Martin is a 61 y o  male with a medical history of HTN, HLD, GERD, and ventral hernia that presented to [facility] on 11/7 for an elective surgical procedure d/t metastatic colon adenocarcinoma by Dr Laurie Reese  Patient presented to hem/onc 2/22 after CT abdomen showed transverse colonic apple core lesion and innumerable hepatic metastases  MRI revealed multiple hepatic metastasis with smaller lesions in left lobe and larger lesions in right lobe  Patient completed chemotherapy, colostomy at that time  On 11/7 patient underwent exploratory laparotomy, segment 3 liver resection, ablation, intraoperative ultrasound, and colostomy reversal  This was c/b left upper quadrant hematoma, imaging showed suspected leak from transverse colon anastomosis  On 11/16, patient underwent exp lap which revealed LUQ infected hematoma, defect in transverse colon anastomosis with extravasation of succus  Massively dilated cecum requiring resection  He had a right hemicolectomy, left in discontinuity and temporary abdominal closure  On 11/17, re-exploration, partial descending colectomy, primary colonic anastomosis created with diverting loop ileostomy and midline wound VAC placement  He completed course of IV abx for ESBL bacteremia from abdominal abscess  Midline abdominal incision wet to dry dressings and packing to old stoma site  12/6 abdominal incision noted to have yellow drainage  CT abdomen showed abdominal abscess and distended gallbladder  Patient underwent percutaneous cholecystostomy tube insertion and hepatectomy abscess drainage  ID consulted, IV abx for 1 week  Nephrology consulted d/t ALEXY, creatinine baseline 1 2-1 4   On 12/14 patient experienced chest pain, appeared musculoskeletal with no further work-up  Patient deemed medically stable and admitted to [facility] on 12/14/2022  SUBJECTIVE: Patient seen face to face  No acute issues overnight  Patient in good spirits, looking to going home tomorrow  No complaints at this time  Slept well, appetite is good, has been constantly eating all meals without nausea  No CP, SOB, fever, chills, N/V, abdominal pain  ASSESSMENT: Stable, progressing    PLAN:  1  Continue current rehabilitaiton program    2  Nausea subsided, appetite improved, continue with current diet  3  Leukocytosis 10 52 (previous 8 26)  4  Discharge home 01/17 with home RN, PT, OT    Rehabilitation  • Functional deficits:  Decreased foot clearance, shuffle gait  • Continue current rehabilitation plan of care to maximize function      • Functional update:   • PT: mobility- independent, transfer-supervision, ambulation- supervision 48' RW  • OT: ADL- bathing and dressing- min A, LB mod A- max A, footwear- total A; toileting- set-up    · Estimated Discharge: 01/17 with home PT, OT, RN    Pain  • Tylenol, oxycodone    DVT prophylaxis  • Heparin sc    Bladder plan  • Continent    Bowel plan  • Ileostomy     * Malignant neoplasm of transverse colon (HCC)  Assessment & Plan  · Transverse colonic apple core lesion and innumerable hepatic metastases  · S/p Colostomy placement 2/22  · RCW port-a-cath, accessed  · S/p palliative chemo  · Follows with Dr Raymondo Aschoff (palliative)  · Follows hem/onc (Dr Justine Little)    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 liver [facility])  Assessment & Plan  · MRI-multiple hepatic metastasis; smaller lesions in left lobe, larger lesions in right lobe  · 11/7 Exp Lap, resection of hepatic segment 3, resection of transverse colon with reversal of loop colostomy (Dr Netta Mckenzie)  · 11/16- right hemicolectomy, left discontinuity and temporary abdominal closure device placed  · 11/17- re-exploration, partial descending colectomy, primary colocolonic anastomosis with diverting loop ileostomy, midline VAC placement  · CT showed abdominal abscess and distended gallbladder  · 12/8- IR percutaneous cholecystostomy tube, hepatectomy abscess drain placement  ·  IR on 12/20 additional drains placed for a perisplenic abscess as well as a splenic recess abscess  · Total of 3 drains in place  Elevated alkaline phosphatase level  Assessment & Plan  1125, (previous 1025)  Hepatic mets  Follow level  ? bone involvement - no evidence of ROM limitations or pain complaints on survey  Follow up with Surg Onc    Dehiscence of incision  Assessment & Plan  Improving  Wound Care following and surgical oncology following   [facility] present  Surgical oncology- no signs of infection, recommend Santyl, ABD, paper tape     [address] as follows:   Skin Care Plan:  1-Midline Incision and Right Abdomen Wounds: Cleanse wounds with NS and pat dry  Apply Santyl to slough tissue on wound bed nickel thick  Cover with ABD and secure with minimal tape  Change daily or PRN soilage or displacement  2-Turn/reposition q2h or when medically stable for pressure re-distribution on skin   3-Elevate heels to offload pressure  4-Moisturize skin daily with skin nourishing cream  5-Ehob cushion in chair when out of bed  6-Preventative Hydraguard to bilateral heels and bilateral sacro-buttocks BID and PRN          Leukocytosis  Assessment & Plan  · WBC 10 52 (previous 8 26)  · 01/11-repeat CT abd/pelvis w/ contrast  · Febrile- blood cultures sent, Ertapenem restarted  · Monitor temps      Acute pain  Assessment & Plan  · Managed on Scheduled Tylenol 975 mg [id number]  · Managed on Robaxin 500 mg BId  · Oxycodone IR PRN  · Managed well    Abscess  Assessment & Plan  · Abdominal abscess- ESBL E Coli - Contact precautions  · Zosyn completed  · Completed Ertapenem course 12/28/22  · Starting Doxycycline 100 mg Q12h 12/30/22 per ID consultants    · Doxycycline stopped, Ertapenem restarted complete 01/07    · 3 drains placed by IR  · Monitor 3 drain output:  · Abscess drain abdomen A: 0 cc   · Abscess drain back: 0-10 cc  · Abscess drain abdomen B: 15 - 20cc    1/3- tube check w/ IR  Tube position check  Patient having some discomfort in left abdomen while supine - per IR tubes in place, bulbs changed, recheck tube placement in 2 weeks  1/4- drain #3 developed sanguineous drainage with flushing, IR evaluated - flushing appropriately  Continue daily flushes on all the drains and monitor output  Reach out to IR if drainage is <10 cc per day for 2 days  Sepsis ([address] )  Assessment & Plan  · SIRS versus sepsis at this time given his vitals, leukocytosis, and complicated infection history  · Mgmt per ID, IM  · 12/20-IR perisplenic, perigastric drain placement for 2 additional abscesses-  · Perigastric abscess +E  Coli ESBL  · Blood cultures negative     Acute on chronic kidney failure [facility])  Assessment & Plan  · Creatinine baseline 1 2-1 4  · Cr = 1 12  · Monitor BMP    Bacteremia  Assessment & Plan  · Abx completed 01/16  · Monitor CBC    Iron deficiency anemia, unspecified  Assessment & Plan  · Hbg = 7 9 (previous 8 0)  · 12/15-1 unit PRBc  · 12/16- symptomatic- 1 unit PRBc  · 12/28- 1 unit PRBc  · Monitor CBC    Obesity (BMI 30-39  9)  Assessment & Plan  · BMI 33 0  · Current BMI 26 98  · Nutrition consult      Benign essential hypertension  Assessment & Plan  · Home: Norvasc 5 mg BID  · Here: Norvasc 5 mg BID  · Adjust medication as needed    Type 2 diabetes mellitus without complication, with long-term current use of insulin (HCC)  Assessment & Plan  · HbgA1c 8 0 (9/22)  · Home monitoring with [place]  · Home: Lantus 12 units, Glyburide, Januvia, Metformin  · Here: Lantus 10 units,  Humalog 4 units with meals, SSI   · Follow with PCP (Dr Sandy Reynolds)      [address] consultants medical co-management  Labs, medications, and imaging reviewed       ROS:  Review of Systems   A 10 point review of systems was negative except for what is noted in the HPI  OBJECTIVE:   /80   Pulse 72   Temp 97 9 °F (36 6 °C) (Oral)   Resp 16   Ht 5' 10" (1 778 m)   Wt 85 2 kg (187 lb 13 3 oz)   SpO2 97%   BMI 26 95 kg/m²     Physical Exam  Constitutional:       Appearance: Normal appearance  HENT:      Head: Normocephalic and atraumatic  Mouth/Throat:      Mouth: Mucous membranes are moist    Cardiovascular:      Rate and Rhythm: Normal rate and regular rhythm  Pulses: Normal pulses  Heart sounds: Normal heart sounds  Pulmonary:      Effort: Pulmonary effort is normal       Breath sounds: Normal breath sounds  Abdominal:      General: Bowel sounds are normal       Palpations: Abdomen is soft  Musculoskeletal:         General: Normal range of motion  Skin:     General: Skin is warm and dry  Capillary Refill: Capillary refill takes less than 2 seconds  Neurological:      Mental Status: He is alert and oriented to person, place, and time     Psychiatric:         Mood and Affect: Mood normal          Judgment: Judgment normal           Lab Results   Component Value Date    WBC 10 52 (H) 01/16/2023    HGB 8 9 (L) 01/16/2023    HCT 28 0 (L) 01/16/2023    MCV 83 01/16/2023     01/16/2023     Lab Results   Component Value Date    SODIUM 135 01/16/2023    K 3 6 01/16/2023     (H) 01/16/2023    CO2 20 (L) 01/16/2023    BUN 15 01/16/2023    CREATININE 1 12 01/16/2023    GLUC 153 (H) 01/16/2023    CALCIUM 8 5 01/16/2023     Lab Results   Component Value Date    INR 1 12 12/19/2022    INR 1 10 11/12/2022    INR 1 24 (H) 11/09/2022    PROTIME 14 7 (H) 12/19/2022    PROTIME 14 4 11/12/2022    PROTIME 15 8 (H) 11/09/2022       Current Facility-Administered Medications:   •  acetaminophen (TYLENOL) tablet 325 mg, 325 mg, Oral, Q4H PRN, Cheyanne Seo MD  •  acetaminophen (TYLENOL) tablet 975 mg, 975 mg, Oral, Q8H [facility], GAURANG Keith, 975 mg at 01/16/23 1338  •  amLODIPine (NORVASC) tablet 5 mg, 5 mg, Oral, BID, GAURANG Keith, 5 mg at 01/16/23 8593  •  aspirin chewable tablet 81 mg, 81 mg, Oral, Daily, GAURANG Keith, 81 mg at 01/16/23 0851  •  atorvastatin (LIPITOR) tablet 40 mg, 40 mg, Oral, Daily, GAURANG Keith, 40 mg at 01/16/23 0851  •  calcium carbonate (TUMS) chewable tablet 500 mg, 500 mg, Oral, BID PRN, GAURANG Keith, 500 mg at 01/06/23 1402  •  cholecalciferol (VITAMIN D3) tablet 400 Units, 400 Units, Oral, Daily, GAURANG Keith, 400 Units at 01/16/23 3708  •  collagenase (SANTYL) ointment, , Topical, Daily, Cheyanne Seo MD, Given by Other at 01/16/23 1147  •  DULoxetine (CYMBALTA) delayed release capsule 30 mg, 30 mg, Oral, Daily, Katerin Kothari DO, 30 mg at 01/16/23 1221  •  ertapenem (INVanz) 1,000 mg in sodium chloride 0 9 % 50 mL IVPB, 1,000 mg, Intravenous, Q24H, Lynnette Russell MD, Last Rate: 100 mL/hr at 01/15/23 2129, 1,000 mg at 01/15/23 2129  •  heparin (porcine) subcutaneous injection 5,000 Units, 5,000 Units, Subcutaneous, Q8H [address], GAURANG Keith, 5,000 Units at 01/16/23 1339  •  insulin glargine (LANTUS) subcutaneous injection 10 Units 0 1 mL, 10 Units, Subcutaneous, HS, GAURANG Marcos, 10 Units at 01/15/23 2113  •  insulin lispro (HumaLOG) 100 units/mL subcutaneous injection 1-5 Units, 1-5 Units, Subcutaneous, HS, GAURANG Keith, 1 Units at 01/15/23 2114  •  insulin lispro (HumaLOG) 100 units/mL subcutaneous injection 1-6 Units, 1-6 Units, Subcutaneous, TID AC, 2 Units at 01/16/23 1223 **AND** Fingerstick Glucose (POCT), , , TID AC, GAURANG Keith  •  insulin lispro (HumaLOG) 100 units/mL subcutaneous injection 4 Units, 4 Units, Subcutaneous, TID With Meals, GAURANG Acevedo, 4 Units at 01/16/23 1223  •  iohexol (OMNIPAQUE) 240 MG/ML solution 35 mL, 35 mL, Oral, 90 min pre-procedure, GAURANG Marcos  •  melatonin tablet 3 mg, 3 mg, Oral, HS, GAURANG Keith, 3 mg at 01/15/23 2139  •  methocarbamol (ROBAXIN) tablet 500 mg, 500 mg, Oral, BID, GAURANG Keith, 500 mg at 01/16/23 0851  •  multivitamin-minerals (CENTRUM) tablet 1 tablet, 1 tablet, Oral, Daily, GAURANG Keith, 1 tablet at 01/16/23 0851  •  ondansetron (ZOFRAN-ODT) dispersible tablet 4 mg, 4 mg, Oral, Q6H PRN, GAURANG Keith, 4 mg at 01/11/23 1503  •  ondansetron (ZOFRAN-ODT) dispersible tablet 4 mg, 4 mg, Oral, BID, GAURANG Mcfarland, 4 mg at 01/14/23 5247  •  oxyCODONE (ROXICODONE) immediate release tablet 10 mg, 10 mg, Oral, Q6H PRN, GAURANG Keith, 10 mg at 01/15/23 2112  •  oxyCODONE (ROXICODONE) IR tablet 2 5 mg, 2 5 mg, Oral, Q3H PRN, GAURANG Keith, 2 5 mg at 12/25/22 1911  •  oxyCODONE (ROXICODONE) IR tablet 5 mg, 5 mg, Oral, Q6H PRN, GAURANG Keith, 5 mg at 01/16/23 0330  •  pantoprazole (PROTONIX) EC tablet 40 mg, 40 mg, Oral, BID AC, GAURANG Keith, 40 mg at 01/16/23 8388  •  simethicone (MYLICON) chewable tablet 80 mg, 80 mg, Oral, 4x Daily (with meals and at bedtime), GAURANG Keith, 80 mg at 01/16/23 1221  •  tamsulosin (FLOMAX) capsule 0 4 mg, 0 4 mg, Oral, Daily With Dinner, GAURANG Coello, 0 4 mg at 01/15/23 1801    Past Medical History:   Diagnosis Date   • Abdominal pain 03/12/2022   • [facility] ([facility] )     [id number] resolved   • Cancer ([place] [address] )    • Diabetes mellitus ([place] [address] )    • Enteritis 08/23/2016   • Gastroparesis due to DM ([place] [address] ) 08/23/2016   • GERD (gastroesophageal reflux disease)    • Hernia, ventral 08/04/2016   • Hyperlipidemia    • Hypertension    • Morbid obesity ([place] [address] ) 04/17/2018   • Postoperative visit 03/02/2022   • SIRS (systemic inflammatory response syndrome) ([place] [address] ) 03/12/2022   • Snoring    • Stage 3a chronic kidney disease ([place] [address] ) 02/19/2022       Patient Active Problem List    Diagnosis Date Noted   • Malignant neoplasm of transverse colon ([place] [address] ) 03/01/2022   • 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 liver ([facility] ) 03/02/2022   • Dehiscence of incision 12/30/2022   • Elevated alkaline phosphatase level 12/30/2022   • Leukocytosis 12/29/2022   • Abscess 12/27/2022   • Acute pain 12/27/2022   • Sepsis ([place] [address] ) 12/17/2022   • Severe protein-calorie malnutrition ([place] [address] ) 12/14/2022   • Acute on chronic kidney failure ([place] [address] ) 12/14/2022   • Flash pulmonary edema ([place] [address] ) 11/12/2022   • MR (mitral regurgitation) 11/12/2022   • Bacteremia 11/12/2022   • ESBL (extended spectrum beta-lactamase) producing bacteria infection 11/12/2022   • Acute respiratory failure with hypoxia ([place] [address] ) 11/12/2022   • Encephalopathy 11/09/2022   • Cervical radiculopathy 10/26/2022   • Other fatigue 06/15/2022   • Colostomy prolapse ([place] [address] ) 05/27/2022   • Colon cancer metastasized to liver ([place] [address] ) 03/12/2022   • Iron deficiency anemia, unspecified 03/01/2022   • Thrombocytosis 02/21/2022   • Transaminitis 02/19/2022   • Type 2 diabetes mellitus with hyperlipidemia ([place] [address] ) 02/18/2022   • Microcytic anemia 02/18/2022   • Left ureteral calculus 01/30/2020   • Incarcerated umbilical hernia 65/53/1744   • Testicular hypogonadism 06/19/2017   • Low testosterone 05/30/2017   • Type 2 diabetes mellitus without complication, with long-term current use of insulin ([place] [address] ) 08/23/2016   • Benign essential hypertension 08/23/2016   • Mixed hyperlipidemia 08/23/2016   • Erectile dysfunction 07/11/2016   • Obesity (BMI 30-39 9) 07/11/2016      GAURANG Lanier  Physical Medicine and Chris Luu    Total visit time: 35 minutes, with more than 50% spent counseling/coordinating care  Counseling includes discussion with patient re: progress in therapies, functional issues observed by therapy staff, and discussion with patient regarding their current medical state and wellbeing   Coordination of patient's care was performed in conjunction with Internal Medicine service to monitor patient's labs, vitals, and management of their comorbidities

## 2023-01-16 NOTE — PROGRESS NOTES
01/16/23 1020   Pain Assessment   Pain Assessment Tool 0-10   Pain Score 7   Pain Location/Orientation   (upper left drain site)   Restrictions/Precautions   Precautions Fall Risk;Contact/isolation;Multiple lines;Supervision on toilet/commode   Weight Bearing Restrictions No   ROM Restrictions No   Lifestyle   Autonomy "I had a really good conversation, thank you for giving me the time"   Oral Hygiene   Type of Assistance Needed Independent   Physical Assistance Level No physical assistance   Comment seated in w/c at sink   Oral Hygiene CARE Score 6   Shower/Bathe Self   Type of Assistance Needed Physical assistance   Physical Assistance Level 25% or less   Comment sponge bathing routine at sink bathing all parts requiring assist for lower legs/feet  Shower/Bathe Self CARE Score 3   Upper Body Dressing   Type of Assistance Needed Physical assistance   Physical Assistance Level 25% or less   Comment assist to manage drains and attach to clothing   Upper Body Dressing CARE Score 3   Lower Body Dressing   Type of Assistance Needed Physical assistance   Physical Assistance Level 51%-75%   Comment assist to thread over feet, stands with supervision for CM   Lower Body Dressing CARE Score 2   Putting On/Taking Off Footwear   Type of Assistance Needed Physical assistance   Physical Assistance Level Total assistance   Comment total assist for donning footwear   Putting On/Taking Off Footwear CARE Score 1   Sit to Stand   Type of Assistance Needed Supervision   Physical Assistance Level No physical assistance   Comment RW   Sit to Stand CARE Score 4   Toileting Hygiene   Type of Assistance Needed Set-up / clean-up   Physical Assistance Level No physical assistance   Comment pt demo's good carryover of emptying of ostomy with set up of items needed   Toileting Hygiene CARE Score 5   Cognition   Overall Cognitive Status [facility]   Arousal/Participation Alert; Cooperative   Attention Attends with cues to redirect   Orientation Level Oriented X4   Memory Within functional limits   Following Commands Follows one step commands with increased time or repetition   Activity Tolerance   Activity Tolerance Patient tolerated treatment well   Assessment   Treatment Assessment pt engages in remaining 55 minute skilled OT session focusing on finishing sponge bathing routine, func transfers and standing for self care tasks  pt with no significant change in CARE scores, continues to require assist for managing drain sites--edu provided to "bathe upper body, dress upper body (including pinning drains to shirt); bathe lower body, dress lower body"  pt returned to bed reporting, "I think I'm going to take a nap"  recommend continued skilled care to focus on ADL retraining, func transfers, standing julia/bal, UE Strengthening/endurance, in order to decrease burden of care at d/c  Prognosis Fair   Problem List Decreased strength;Decreased range of motion; Impaired balance;Decreased endurance;Decreased mobility; Decreased coordination; Impaired sensation;Decreased skin integrity;Pain   Barriers to Discharge Decreased caregiver support   Plan   Treatment/Interventions ADL retraining;Functional transfer training; Therapeutic exercise; Endurance training;Patient/family training;Equipment eval/education; Compensatory technique education   OT Therapy Minutes   OT Time In 1020   OT Time Out 1115   OT Total Time (minutes) 55   OT Mode of treatment - Individual (minutes) 55   OT Mode of treatment - Concurrent (minutes) 0   OT Mode of treatment - Group (minutes) 0   OT Mode of treatment - Co-treat (minutes) 0   OT Mode of Treatment - Total time(minutes) 55 minutes   OT Cumulative Minutes 1983   Therapy Time missed   Time missed?  No

## 2023-01-16 NOTE — SOCIAL WORK
LSW was requested to meet with patient to provide emotional support  Patient describes feelings of loss, anger, grief, and difficulty with adjustment  Time was spent processing these emotions and discussing how it would be appropriate to continue these conversations with the outpatient [facility] team       Patient has both physical and emotional goals following rehabilitation  Patient is able to identify that change is difficult but that he wants to put in the work specifically for his wife and children  I have spent 30 minutes with Patient  today in which greater than 50% of this time was spent in counseling/coordination of care regarding supportive listening       Palliative  will follow-up as requested by patient, family, and primary team   Please contact with any specific requests

## 2023-01-16 NOTE — PROGRESS NOTES
Progress Note - Infectious Disease   Sam Shipleyudent 61 y o  male MRN: [id number]  Unit/Bed#: -01 Encounter: [id number]      Impression/Plan:    Sepsis     Evolving 12/17:  WBC, HR   Suspect due to persistent left intra-abdominal infection in setting of complex history below   ROS and exam otherwise negative   Recent Flu/RSV/COVID PCR, CXR negative   Patient with noted decreased drain output  Repeat CT AP obtained 1/11 showed a moderate to large left pleural effusion, worsening left hepatic postsurgical fluid collection, resolution of splenic collection with catheter in place  Status post left thoracentesis and drain upsized 1/12/23  Fever curve and leukocytosis improving  Rec:  • Antibiotic plan as below  • Follow drain output, drain check scheduled 1/26/23  • Follow temperatures closely  • Recheck CBC in AM  • Supportive care as per the primary service     Intra-abdominal abscess     In the setting complex surgical history as below   Initially developed 11/2022 accompanied by ESBL E  coli bacteremia   Status post exploratory laparotomy, right hemicolectomy, temporary abdominal closure 11/16; re-exploration, partial left colectomy, primary ileocolonic anastomosis with proximal diverting loop ileostomy, midline fascial closure on 11/17   More recently developed abscess in hepatectomy bed, also collection +/- leak at area of prior colonic anastamosis, 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   Status post IR drain into perihepatic collection 12/8   Cultures with ESBL E  Coli   Status post 7 days ertapenem after drain placement through 12/15   Developed recurrent sepsis and repeat CT 12/17 shows hepatic bed collection improved, persistent left intra-abdominal collection   Status post perigastic, perisplenic drains 12/20   Cultures again with ESBL E  Coli   Status post 21 days total antibiotics, completed 1/7   Recurrent fever and leukocytosis as above 1/10 in setting of decreased drain output   CT AP obtained  showed a moderate to large left pleural effusion, worsening left hepatic postsurgical fluid collection, resolution of splenic collection with catheter in place  Status post left thoracentesis and drain upsized 23  Pleural fluid exudative, likely reactive due to underlying intra-abdominal processes  Fever and leukocytosis improved with drain upsize and antibiotics  Rec:  • Complete ertapenem today, which is additional 5 days of antibiotics after drain upsizing  • Drain management per surgery and IR, drain check scheduled 2023     Possible acute cholecystitis     Status post percutaneous cholecystostomy tube   Alk phos remains markedly elevated, possibly due to drain itself versus known intrahepatic metastases      Metastatic colon cancer   To liver, possible lung   Status post resection, chemotherapy, more recent hepatic resection and ablation, transverse colon resection      CKD   Baseline Cr 1 4       DM      Anemia   Status post multiple transfusions  Above management plan discussed with the patient at bedside and surgical team   ID will follow    Antibiotics:  Ertapenem restart day 8    Subjective:  Patient states he is feeling better  Potential plan for discharge tomorrow  He is having some discomfort around the abdominal drains, but overall pain is improving  He denies fevers or chills  Night sweats are improving  Objective:  Vitals:  Temp:  [97 6 °F (36 4 °C)-98 1 °F (36 7 °C)] 97 6 °F (36 4 °C)  HR:  [] 110  Resp:  [16-17] 17  BP: (127-142)/(78-86) 127/78  SpO2:  [97 %-100 %] 99 %  Temp (24hrs), Av 9 °F (36 6 °C), Min:97 6 °F (36 4 °C), Max:98 1 °F (36 7 °C)  Current: Temperature: 97 6 °F (36 4 °C)    Physical Exam:   General Appearance:   Debilitated appearing but nontoxic, no acute distress  Throat: Oropharynx moist without lesions      Lungs:   Clear to auscultation bilaterally; no wheezes, rhonchi or rales; respirations unlabored   Heart:  RRR; no murmur, rub or gallop   Abdomen:    3 abdominal drains in place   Extremities: No clubbing, cyanosis or edema   Skin: No new rashes or lesions  No draining wounds noted  Labs: All pertinent labs and imaging studies were personally reviewed  Results from last 7 days   Lab Units 01/16/23  1143 01/14/23  0621 01/13/23  0530   WBC Thousand/uL 10 52* 8 26 10 94*   HEMOGLOBIN g/dL 8 9* 7 3* 7 4*   PLATELETS Thousands/uL 350 322 362     Results from last 7 days   Lab Units 01/16/23  1143 01/14/23  0621 01/12/23  0452   SODIUM mmol/L 135 139 136   POTASSIUM mmol/L 3 6 3 6 3 9   CHLORIDE mmol/L 109* 111* 107   CO2 mmol/L 20* 21 20*   BUN mg/dL 15 14 16   CREATININE mg/dL 1 12 1 09 1 00   EGFR ml/min/1 73sq m 72 74 82   CALCIUM mg/dL 8 5 8 4 8 5   AST U/L 43  --   --    ALT U/L 12  --   --    ALK PHOS U/L 1,033*  --   --                        Micro:  Results from last 7 days   Lab Units 01/12/23  1512 01/10/23  2125   BLOOD CULTURE   --  No Growth After 5 Days  No Growth After 5 Days  GRAM STAIN RESULT  4+ Polys  No bacteria seen  --    BODY FLUID CULTURE, STERILE  No growth  --        Imaging:  Pertinent imaging in PACS personally reviewed

## 2023-01-16 NOTE — PROGRESS NOTES
Progress Note - Surgical Oncology  Denisse Benavides 61 y o  male MRN: [id number]  Unit/Bed#: [facility] 604-42 Encounter: [id number]      Assessment:  61 y o  male  status post segment 3 liver resection with transverse colectomy followed by right colectomy for a leak and drainage of multiple abscesses/hematoma  Afebrile, hemodynamically stable     1u pRBC transfused on 1/15  Plan:  - CCD2 diet, supplements, encourage nutrition  -Percutaneous cholecystostomy tube, monitor output and character  - pain control  - Abx per ID, follow up Recs  - Continue drains, tube check 2 weeks from prior study  - Remainder of care per primary        Subjective: No acute events overnight  Afebrile, hemodynamically stable  Tolerating diet  No nausea, or vomiting, fevers or chills  Objective:   Temp:  [97 9 °F (36 6 °C)-98 9 °F (37 2 °C)] 97 9 °F (36 6 °C)  HR:  [72-91] 72  Resp:  [16-20] 16  BP: (119-142)/(69-86) 142/82    Physical Exam:  General: No acute distress, alert and oriented  CV: Well perfused, regular rate and rhythm  Lungs: Normal work of breathing, no increased respiratory effort  Abdomen: Soft, non-tender, non-distended  Drains in place, midline wound with kerlix and santyl      Extremities: No edema, clubbing or cyanosis  Skin: Warm, dry      I/O       01/14 0701  01/15 0700 01/15 0701  01/16 0700 01/16 0701  01/17 0700    P  O   360     I V  (mL/kg)  30 (0 4)     Blood  361 7     NG/GT 0 10     Total Intake(mL/kg) 0 (0) 761 7 (8 9)     Urine (mL/kg/hr) 775 (0 4) 300 (0 1)     Emesis/NG output 20 23     Drains 25 42 5     Stool 925 750     Total Output 1745 1115 5     Net -7008 -665 8                  Lab, Imaging and other studies: I have personally reviewed pertinent reports    , CBC with diff: No results found for: WBC, HGB, HCT, MCV, PLT, ADJUSTEDWBC, MCH, MCHC, RDW, MPV, NRBC, BMP/CMP: No results found for: SODIUM, K, CL, CO2, ANIONGAP, BUN, CREATININE, GLUCOSE, CALCIUM, AST, ALT, ALKPHOS, PROT, BILITOT, Chris Barton MD  1/16/2023 7:31 AM

## 2023-01-17 ENCOUNTER — TRANSITIONAL CARE MANAGEMENT (OUTPATIENT)
Dept: INTERNAL MEDICINE CLINIC | Facility: CLINIC | Age: 58
End: 2023-01-17

## 2023-01-17 VITALS
SYSTOLIC BLOOD PRESSURE: 124 MMHG | RESPIRATION RATE: 20 BRPM | OXYGEN SATURATION: 98 % | HEIGHT: 70 IN | WEIGHT: 187.83 LBS | TEMPERATURE: 98.6 F | BODY MASS INDEX: 26.89 KG/M2 | HEART RATE: 85 BPM | DIASTOLIC BLOOD PRESSURE: 80 MMHG

## 2023-01-17 LAB
GLUCOSE SERPL-MCNC: 109 MG/DL (ref 65–140)
GLUCOSE SERPL-MCNC: 135 MG/DL (ref 65–140)

## 2023-01-17 RX ADMIN — Medication 1 TABLET: at 08:22

## 2023-01-17 RX ADMIN — ACETAMINOPHEN 975 MG: 325 TABLET, FILM COATED ORAL at 06:15

## 2023-01-17 RX ADMIN — AMLODIPINE BESYLATE 5 MG: 5 TABLET ORAL at 08:22

## 2023-01-17 RX ADMIN — ATORVASTATIN CALCIUM 40 MG: 40 TABLET, FILM COATED ORAL at 08:22

## 2023-01-17 RX ADMIN — PANTOPRAZOLE SODIUM 40 MG: 40 TABLET, DELAYED RELEASE ORAL at 06:15

## 2023-01-17 RX ADMIN — ASPIRIN 81 MG CHEWABLE TABLET 81 MG: 81 TABLET CHEWABLE at 08:22

## 2023-01-17 RX ADMIN — METHOCARBAMOL 500 MG: 500 TABLET ORAL at 08:21

## 2023-01-17 RX ADMIN — INSULIN LISPRO 4 UNITS: 100 INJECTION, SOLUTION INTRAVENOUS; SUBCUTANEOUS at 08:22

## 2023-01-17 RX ADMIN — HEPARIN SODIUM 5000 UNITS: 5000 INJECTION INTRAVENOUS; SUBCUTANEOUS at 06:15

## 2023-01-17 RX ADMIN — CHOLECALCIFEROL TAB 10 MCG (400 UNIT) 400 UNITS: 10 TAB at 08:21

## 2023-01-17 RX ADMIN — COLLAGENASE SANTYL 1 APPLICATION: 250 OINTMENT TOPICAL at 09:44

## 2023-01-17 RX ADMIN — SIMETHICONE 80 MG: 80 TABLET, CHEWABLE ORAL at 08:22

## 2023-01-17 RX ADMIN — DULOXETINE 30 MG: 30 CAPSULE, DELAYED RELEASE ORAL at 08:21

## 2023-01-17 NOTE — NURSING NOTE
Discharging to personal home today and ambulating with minimal assistance using a rolling walker  Vital signs within normal limits and denies complaints of pain  Reinforced education of site care for illeostomy as well as 3 VICTORINO drains  After visit summary printed and nurse reviewed discharge instructions with wife and patient  Medication send to local community pharmacy  All questions and concerns addressed  Patient was escorted off the floor via wheelchair with wife,PCA and personal belongings

## 2023-01-17 NOTE — PLAN OF CARE
Problem: Prexisting or High Potential for Compromised Skin Integrity  Goal: Skin integrity is maintained or improved  Description: INTERVENTIONS:  - Identify patients at risk for skin breakdown  - Assess and monitor skin integrity  - Assess and monitor nutrition and hydration status  - Monitor labs   - Assess for incontinence   - Turn and reposition patient  - Assist with mobility/ambulation  - Relieve pressure over bony prominences  - Avoid friction and shearing  - Provide appropriate hygiene as needed including keeping skin clean and dry  - Evaluate need for skin moisturizer/barrier cream  - Collaborate with interdisciplinary team   - Patient/family teaching  - Consider wound care consult   Outcome: Progressing     Problem: Potential for Falls  Goal: Patient will remain free of falls  Description: INTERVENTIONS:  - Educate patient/family on patient safety including physical limitations  - Instruct patient to call for assistance with activity   - Consult OT/PT to assist with strengthening/mobility   - Keep Call bell within reach  - Keep bed low and locked with side rails adjusted as appropriate  - Keep care items and personal belongings within reach  - Initiate and maintain comfort rounds  - Make Fall Risk Sign visible to staff  - Offer Toileting every 2-4 Hours, in advance of need  - Initiate/Maintain bed/chair alarm  - Obtain necessary fall risk management equipment: nonskid footwear   - Apply yellow socks and bracelet for high fall risk patients  - Consider moving patient to room near nurses station  Outcome: Progressing     Problem: PAIN - ADULT  Goal: Verbalizes/displays adequate comfort level or baseline comfort level  Description: Interventions:  - Encourage patient to monitor pain and request assistance  - Assess pain using appropriate pain scale  - Administer analgesics based on type and severity of pain and evaluate response  - Implement non-pharmacological measures as appropriate and evaluate response  - Consider cultural and social influences on pain and pain management  - Notify physician/advanced practitioner if interventions unsuccessful or patient reports new pain  Outcome: Progressing     Problem: INFECTION - ADULT  Goal: Absence or prevention of progression during hospitalization  Description: INTERVENTIONS:  - Assess and monitor for signs and symptoms of infection  - Monitor lab/diagnostic results  - Monitor all insertion sites, i e  indwelling lines, tubes, and drains  - Monitor endotracheal if appropriate and nasal secretions for changes in amount and color  - [place] appropriate cooling/warming therapies per order  - Administer medications as ordered  - Instruct and encourage patient and family to use good hand hygiene technique  - Identify and instruct in appropriate isolation precautions for identified infection/condition  Outcome: Progressing     Problem: SAFETY ADULT  Goal: Patient will remain free of falls  Description: INTERVENTIONS:  - Educate patient/family on patient safety including physical limitations  - Instruct patient to call for assistance with activity   - Consult OT/PT to assist with strengthening/mobility   - Keep Call bell within reach  - Keep bed low and locked with side rails adjusted as appropriate  - Keep care items and personal belongings within reach  - Initiate and maintain comfort rounds  - Make Fall Risk Sign visible to staff  - Offer Toileting every 2-4 Hours, in advance of need  - Initiate/Maintain bed/chair alarm  - Obtain necessary fall risk management equipment: nonskid footwear  - Apply yellow socks and bracelet for high fall risk patients  - Consider moving patient to room near nurses station  Outcome: Progressing  Goal: Maintain or return to baseline ADL function  Description: INTERVENTIONS:  -  Assess patient's ability to carry out ADLs; assess patient's baseline for ADL function and identify physical deficits which impact ability to perform ADLs (bathing, care of mouth/teeth, toileting, grooming, dressing, etc )  - Assess/evaluate cause of self-care deficits   - Assess range of motion  - Assess patient's mobility; develop plan if impaired  - Assess patient's need for assistive devices and provide as appropriate  - Encourage maximum independence but intervene and supervise when necessary  - Involve family in performance of ADLs  - Assess for home care needs following discharge   - Consider OT consult to assist with ADL evaluation and planning for discharge  - Provide patient education as appropriate  Outcome: Progressing  Goal: Maintains/Returns to pre admission functional level  Description: INTERVENTIONS:  - Perform BMAT or MOVE assessment daily    - Set and communicate daily mobility goal to care team and patient/family/caregiver  - Collaborate with rehabilitation services on mobility goals if consulted  - Perform Range of Motion 3 times a day  - Reposition patient every 2 hours    - Dangle patient 3 times a day  - Stand patient 3 times a day  - Ambulate patient 3 times a day  - Out of bed to chair 3 times a day   - Out of bed for meals 3 times a day  - Out of bed for toileting  - Record patient progress and toleration of activity level   Outcome: Progressing     Problem: DISCHARGE PLANNING  Goal: Discharge to home or other facility with appropriate resources  Description: INTERVENTIONS:  - Identify barriers to discharge w/patient and caregiver  - Arrange for needed discharge resources and transportation as appropriate  - Identify discharge learning needs (meds, wound care, etc )  - Arrange for interpretive services to assist at discharge as needed  - Refer to Case Management Department for coordinating discharge planning if the patient needs post-hospital services based on physician/advanced practitioner order or complex needs related to functional status, cognitive ability, or social support system  Outcome: Progressing     Problem: Nutrition/Hydration-ADULT  Goal: Nutrient/Hydration intake appropriate for improving, restoring or maintaining nutritional needs  Description: Monitor and assess patient's nutrition/hydration status for malnutrition  Collaborate with interdisciplinary team and initiate plan and interventions as ordered  Monitor patient's weight and dietary intake as ordered or per policy  Utilize nutrition screening tool and intervene as necessary  Determine patient's food preferences and provide high-protein, high-caloric foods as appropriate       INTERVENTIONS:  - Monitor oral intake, urinary output, labs, and treatment plans  - Assess nutrition and hydration status and recommend course of action  - Evaluate amount of meals eaten  - Assist patient with eating if necessary   - Allow adequate time for meals  - Recommend/ encourage appropriate diets, oral nutritional supplements, and vitamin/mineral supplements  - Order, calculate, and assess calorie counts as needed  - Recommend, monitor, and adjust tube feedings and TPN/PPN based on assessed needs  - Assess need for intravenous fluids  - Provide specific nutrition/hydration education as appropriate  - Include patient/family/caregiver in decisions related to nutrition  Outcome: Progressing     Problem: MOBILITY - ADULT  Goal: Maintain or return to baseline ADL function  Description: INTERVENTIONS:  -  Assess patient's ability to carry out ADLs; assess patient's baseline for ADL function and identify physical deficits which impact ability to perform ADLs (bathing, care of mouth/teeth, toileting, grooming, dressing, etc )  - Assess/evaluate cause of self-care deficits   - Assess range of motion  - Assess patient's mobility; develop plan if impaired  - Assess patient's need for assistive devices and provide as appropriate  - Encourage maximum independence but intervene and supervise when necessary  - Involve family in performance of ADLs  - Assess for home care needs following discharge   - Consider OT consult to assist with ADL evaluation and planning for discharge  - Provide patient education as appropriate  Outcome: Progressing  Goal: Maintains/Returns to pre admission functional level  Description: INTERVENTIONS:  - Perform BMAT or MOVE assessment daily    - Set and communicate daily mobility goal to care team and patient/family/caregiver  - Collaborate with rehabilitation services on mobility goals if consulted  - Perform Range of Motion 3 times a day  - Reposition patient every 2 hours    - Dangle patient 3 times a day  - Stand patient 3 times a day  - Ambulate patient 3 times a day  - Out of bed to chair 3 times a day   - Out of bed for meals 3 times a day  - Out of bed for toileting  - Record patient progress and toleration of activity level   Outcome: Progressing

## 2023-01-17 NOTE — DISCHARGE SUMMARY
Discharge Summary - PMR   Jaguar Mackenzie 61 y o  male MRN: [id number]  Unit/Bed#: -71 Encounter: [id number]    Admission Date: 12/14/2022     Discharge Date: 01/17/2023    Etiologic/Rehabilitation Diagnosis: Impairment of mobility, safety and Activities of Daily Living (ADLs) due to Neurologic Conditions:  03 8  Neuromuscular Disorders   Etiologic Dx: Critical Illness Myopathy  Date of Onset: 11/7/2022     Date of surgery:      11/7/2022 Exploratory Laparotomy, resection of hepatic segment 3, resection of transverse colon with reversal of loop colostomy  11/16/2022 Right hemicolectomy, left in discontinuity and temporary abdominal closure device placed  11/17/2022 Re-exploration, partial descending colectomy, primary colocolonic anastomosis created with diverting loop ileostomy, midline wound VAC placement  12/8/2022 IR Percutaneous Cholecystostomy Tube Placement, Hepatectomy Abscess Drain Placement  12/12/2022 IR Tube Check    HPI: Jaguar Mackenzie is a 61 y o  male with a medical history of hypertension, hyperlipidemia, ventral hernia, GERD that presented to [address] [place] [address] on 11/7 for an elective surgical procedure due to metastatic colon adenocarcinoma by Dr Jay Aiken  Patient presented to hem/onc on 2/22 when CT of abdomen revealed transverse colonic apple core lesion and innumerable hepatic metastases  MRI revealed multiple hepatic metastasis with smaller lesions in left lobe with larger lesions on the right lobe  On 11/7, patient underwent exploratory laparotomy, segment 3 liver resection, ablation, intraoperative ultrasound of liver  This was complicated by a left upper quadrant hematoma, imaging showed suspected leak from transverse colon anastomosis  On 11/16 went to OR revealed left upper quadrant infected hematoma, defect in the transverse colon anastomosis with extravasation of succus  Massively dilated cecum requiring resection   He had a right hemicolectomy, left in discontinuity and temporary abdominal closure device was placed  On 11/17, he underwent re-exploration, partial descending colectomy, primary colonic anastomosis created with diverting loop ileostomy and midline wound VAC placement  He completed a prolonged course of IV antibiotics for ESBL bacteremia due to intra-abdominal abscess  On 12/3, patient was discharged to SNF for rehab  Patient was transferred back to hospital on 12/4 due to abdominal pain  He is unable to return to SNF due to concerns for pain management and management of acute medical needs  Per Surgery, patient is to continue with wet-to-dry dressings to midline abdominal incision, and packing to old stoma site  Patient has been observed off of antibiotics, with close monitoring of leukocytosis  On 12/6, patient was with noted increased creatinine level, and was administered 1L IV fluid bolus, with noted improvement  He then developed tachycardia, as well as increased abdominal pain, requiring additional IV fluid bolus  Abdominal incision was with noted yellow drainage  CT chest/abdomen/pelvis showed abdominal abscess and distended gallbladder  ID was consulted, and patient was continued on IV antibiotics (plan for 7 day course through 12/15)  Patient was taken to IR and underwent percutaneous cholecystostomy tube insertion and hepatectomy abscess drainage  Nephrology was consulted re: ALEXY, and initiated patient on Sodium Bicarb gtt as well as IV Albumin to assist with intravascular volume shifts, which as since been discontinued  On 12/14, patient with complaints of midline/left chestwall pain, right below IR abscess drain  Lasted for a few seconds and then resolved - suspected related to drain placement, however cardiac workup completed   Trop level was 57 and CXR showed progressive bilateral opacities suspicious for CHF and small left pleural effusion  Cardiology was consulted, with chest pain appearing musculoskeletal, and no further cardiac workup is warranted  Additionally, no diuresis or further intervention suggested for CXR findings  The patient was evaluated by the Rehabilitation team and deemed an appropriate candidate for comprehensive inpatient rehabilitation and admitted to the [facility] on 12/14/2022  3:18 PM    Procedures Performed During [facility] Admission: None    Acute Rehabilitation Center Course: Patient participated in a comprehensive interdisciplinary inpatient rehabilitation program which included involvment of MD, therapies (PT, OT, and/or SLP), RN, CM, SW, dietary, and psychology services  He was able to be advanced to a modified independent level of assist and considered safe for discharge home  Please see below for patient's day to day management of rehabilitation needs  Please refer to Internal Medicine notes during [facility] stay for day to day management of patient's medical co-morbidities       * Malignant neoplasm of transverse colon [facility])  Assessment & Plan  · Transverse colonic apple core lesion and innumerable hepatic metastases  · S/p Colostomy placement 2/22  · RCW port-a-cath, accessed  · S/p palliative chemo  · Follows with Dr Octavio Downs (palliative)  · Follows hem/onc (Dr Treva Arizmendi)    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 liver [facility])  Assessment & Plan  · MRI-multiple hepatic metastasis; smaller lesions in left lobe, larger lesions in right lobe  · 11/7 Exp Lap, resection of hepatic segment 3, resection of transverse colon with reversal of loop colostomy (Dr Mark Anthony Hanley)  · 11/16- right hemicolectomy, left discontinuity and temporary abdominal closure device placed  · 11/17- re-exploration, partial descending colectomy, primary colocolonic anastomosis with diverting loop ileostomy, midline VAC placement  · CT showed abdominal abscess and distended gallbladder  · 12/8- IR percutaneous cholecystostomy tube, hepatectomy abscess drain placement  ·  IR on 12/20 additional drains placed for a perisplenic abscess as well as a splenic recess abscess  · Total of 3 drains in place  Elevated alkaline phosphatase level  Assessment & Plan  1125, (previous 1025)  Hepatic mets  Follow level  ? bone involvement - no evidence of ROM limitations or pain complaints on survey  Follow up with Surg Onc    Dehiscence of incision  Assessment & Plan  Improving  Wound Care following and surgical oncology following   [facility] present  Surgical oncology- no signs of infection, recommend Santyl, ABD, paper tape     [address] as follows:   Skin Care Plan:  1-Midline Incision and Right Abdomen Wounds: Cleanse wounds with NS and pat dry  Apply Santyl to slough tissue on wound bed nickel thick  Cover with ABD and secure with minimal tape  Change daily or PRN soilage or displacement  2-Turn/reposition q2h or when medically stable for pressure re-distribution on skin   3-Elevate heels to offload pressure  4-Moisturize skin daily with skin nourishing cream  5-Ehob cushion in chair when out of bed  6-Preventative Hydraguard to bilateral heels and bilateral sacro-buttocks BID and PRN          Leukocytosis  Assessment & Plan  · WBC 10 52 (previous 8 26)  · 01/11-repeat CT abd/pelvis w/ contrast  · Febrile- blood cultures sent, Ertapenem restarted  · Monitor temps      Acute pain  Assessment & Plan  · Managed on Scheduled Tylenol 975 mg [id number]  · Managed on Robaxin 500 mg BId  · Oxycodone IR PRN  · Managed well    Abscess  Assessment & Plan  · Abdominal abscess- ESBL E Coli - Contact precautions  · Zosyn completed  · Completed Ertapenem course 12/28/22  · Starting Doxycycline 100 mg Q12h 12/30/22 per ID consultants    · Doxycycline stopped, Ertapenem restarted complete 01/07    · 3 drains placed by IR  · Monitor 3 drain output:  · Abscess drain abdomen A: 0 cc   · Abscess drain back: 0-10 cc  · Abscess drain abdomen B: 15 - 20cc    1/3- tube check w/ IR  Tube position check   Patient having some discomfort in left abdomen while supine - per IR tubes in place, bulbs changed, recheck tube placement in 2 weeks  1/4- drain #3 developed sanguineous drainage with flushing, IR evaluated - flushing appropriately  Continue daily flushes on all the drains and monitor output  Reach out to IR if drainage is <10 cc per day for 2 days  Sepsis ([address] )  Assessment & Plan  · SIRS versus sepsis at this time given his vitals, leukocytosis, and complicated infection history  · Mgmt per ID, IM  · 12/20-IR perisplenic, perigastric drain placement for 2 additional abscesses-  · Perigastric abscess +E  Coli ESBL  · Blood cultures negative     Acute on chronic kidney failure [facility])  Assessment & Plan  · Creatinine baseline 1 2-1 4  · Cr = 1 12  · Monitor BMP    Bacteremia  Assessment & Plan  · Abx completed 01/16  · Monitor CBC    Iron deficiency anemia, unspecified  Assessment & Plan  · Hbg = 7 9 (previous 8 0)  · 12/15-1 unit PRBc  · 12/16- symptomatic- 1 unit PRBc  · 12/28- 1 unit PRBc  · Monitor CBC    Obesity (BMI 30-39  9)  Assessment & Plan  · BMI 33 0  · Current BMI 26 98  · Nutrition consult      Benign essential hypertension  Assessment & Plan  · Home: Norvasc 5 mg BID  · Here: Norvasc 5 mg BID  · Adjust medication as needed    Type 2 diabetes mellitus without complication, with long-term current use of insulin (HCC)  Assessment & Plan  · HbgA1c 8 0 (9/22)  · Home monitoring with [facility]  · Home: Lantus 12 units, Glyburide, Januvia, Metformin  · Here: Lantus 10 units,  Humalog 4 units with meals, SSI   · Follow with PCP (Dr Courtney Medina)        /80   Pulse 85   Temp 98 6 °F (37 °C) (Oral)   Resp 20   Ht 5' 10" (1 778 m)   Wt 85 2 kg (187 lb 13 3 oz)   SpO2 98%   BMI 26 95 kg/m²     Discharge Physical Examination:    Gen: No acute distress, Well-nourished, well-appearing  HEENT: Moist mucus membranes, Normocephalic/Atraumatic  Cardiovascular: Regular rate, rhythm, S1/S2  Distal pulses palpable  Heme/Extr: No edema/clubbing/cyanosis  Pulmonary: Non-labored breathing   Lungs CTAB  : continent, voiding  GI: Soft, tender, non-distended  + ileostomy, + 3 VICTORINO drains, + per radha drain  MSK: ROM is WFL in all extremities  No effusions or deformities  Bulk is symmetric  See below for MMT scores  Integumentary: Skin is warm, dry  No rashes or ulcers  Neuro: AAOx3, CN 2-12 intact  Sensation intact to light touch throughout  Speech is intact  Appropriate to questioning  MMT:   Strength:   Right  Left  Site  Right  Left  Site    5 5  S Ab: Shoulder Abductors  4+  4+  HF: Hip Flexors    5 5  EF: Elbow Flexors  5  5 KF: Knee Flexors    5  5  EE: Elbow Extensors  5  5  KE: Knee Extensors    5  5  WE: Wrist Extensors  5  5  DR: Dorsi Flexors    5  5  FF: Finger Flexors  5  5  PF: Plantar Flexors    5  5  HI: Hand Intrinsics  5  5  EHL: Extensor Hallucis Longus   Psych: Normal mood and affect  Significant Findings, Care, Treatment and Services Provided: Acute comprehensive interdisciplinary inpatient rehabilitation including PT, OT, SLP, RN, CM, SW, dietary, psychology, etc     Complications:   32/19- Hbg 7 3,  1 unit PRBc  12/16- Hbg 8 0 symptomatic- 1 unit PRBc  12/17- Alk phos elevated, GGT elevated with LFT remaining normal, 2/2 biliary cause versus bone versus other organs that harbor alk phos versus medication induced  12/20- IR for additional drain placement for perisplenic abscess and splenic recess abscess  Perigastric abscess +E  Coli ESBL, blood culture negative  12/28- Hbg 7 1, 1 unit PRBc  12/30- Hbg 7 0, 1 unit PRBc  1/3- tube check w/ IR, patient with abdominal discomfort, bulbs replaced  1/4- VICTORINO drain developed sanguineous drainage w/ flushing  IR evaluated   Next tube check [facility]24/[facility]   1/7 Ertapenem complete  1/09- experienced chills, low grade temp 99 3, but NO fevers  01/10- temp 102 4, WBC 16 66  1/11- WBC 17 07, repeat CT A/P- mod-lg effusion with adjacent atelectasis; ertapenem restarted  01/12-left thoracentesis 300 cc of clear fluid removed and sent for analysis and tube check- tube #2 exchanged with larger tube, 40 mL purulent drainage removed  01/15- Hbg 7 3, 1 unit PRBc, WBC 8 26  01/16- antibiotics completed, WBC 10 52    Functional Status Upon Admission to ARC:  Physical therapy: mobility- Moderate assist, transfers- Maximal assist, ambulation- Mod A RW 8'  Occupational therapy: ADL- Maximal assistance    Functional Status Upon Discharge from [facility]C:  Physical therapy: bed mobility- independent, transfers-supervision, ambulation- modified independent - supervision ', stairs- 4, incidental touching  Occupational therapy: ADL-min A, dressing UB-min A, LB mod-max A, footwear- total A    Discharge Diagnosis: Impairment of mobility, safety and Activities of Daily Living (ADLs) due to Neurologic Conditions:  03 8  Neuromuscular Disorders    Discharge Medications:   See after visit summary for reconciled discharge medications provided to patient and family  Condition at Discharge: good     Discharge instructions/Information to patient and family:   See after visit summary for information provided to patient and family  Provisions for Follow-Up Care:  See after visit summary for information related to follow-up care and any pertinent home health orders  Future Appointments   Date Time Provider Torie Li   2/2/2023  2:30 PM Jared Rodriguez MD MED AS MON Med Spc   2/16/2023  1:00 PM Lorena Freed MD [facility] SO [facility]Onc   2/23/2023  2:40 PM Errol Patel MD HEM ONC STR Practice-Onc       Disposition: Home    Planned Readmission: No    Discharge Statement   I spent 70 minutes discharging the patient  This time was spent on the day of discharge  I had direct contact with the patient on the day of discharge  Greater than 50% of the total time was spent examining patient, answering all patient questions, arranging and discussing plan of care with patient as well as directly providing post-discharge instructions   Additional time then spent on discharge activities  Discharge Medications:  See after visit summary for reconciled discharge medications provided to patient and family        Facility Administered Medications Prior to Discharge:    Current Facility-Administered Medications   Medication Dose Route Frequency Provider Last Rate   • acetaminophen  325 mg Oral Q4H PRN Darlene Forte MD     • acetaminophen  975 mg Oral Q8H [address] GAURANG Keith     • amLODIPine  5 mg Oral BID GAURANG Yadav     • aspirin  81 mg Oral Daily GAURANG Keith     • atorvastatin  40 mg Oral Daily GAURANG Keith     • calcium carbonate  500 mg Oral BID PRN GAURANG Yadav     • cholecalciferol  400 Units Oral Daily GAURANG Yadav     • collagenase   Topical Daily Darlene Forte MD     • DULoxetine  30 mg Oral Daily Remus Begun [place],      • ertapenem  1,000 mg Intravenous Q24H Willy Justin MD 1,000 mg (01/15/23 2129)   • heparin (porcine)  5,000 Units Subcutaneous Q8H [address] GAURANG Keith     • insulin glargine  10 Units Subcutaneous HS GAURANG Alatorre     • insulin lispro  1-5 Units Subcutaneous HS GAURANG Keith     • insulin lispro  1-6 Units Subcutaneous TID AC GAURANG Keith     • insulin lispro  4 Units Subcutaneous TID With Meals GAURANG Alatorre     • iohexol  35 mL Oral 90 min pre-procedure GAURANG Alatorre     • melatonin  3 mg Oral HS GAURANG Keith     • methocarbamol  500 mg Oral BID GAURANG Yadav     • multivitamin-minerals  1 tablet Oral Daily GAURANG Keith     • ondansetron  4 mg Oral Q6H PRN GAURANG Yadav     • ondansetron  4 mg Oral BID GAURANG Alatorre     • oxyCODONE  10 mg Oral Q6H PRN GAURANG Yadav     • oxyCODONE  2 5 mg Oral Q3H PRN GAURANG Yadav     • oxyCODONE  5 mg Oral Q6H PRN GAURANG Keith     • pantoprazole  40 mg Oral BID AC GAURANG Keith     • simethicone  80 mg Oral 4x Daily (with meals and at bedtime) GAURANG Costa     • tamsulosin  0 4 mg Oral Daily With Dinner GAURANG Costa, [address] [place] [address]

## 2023-01-17 NOTE — PROGRESS NOTES
Progress Note - Infectious Disease   Raissa Petty 61 y o  male MRN: [id number]  Unit/Bed#: -01 Encounter: [id number]      Impression/Recommendations:  Sepsis     Evolving 12/17:  WBC, HR   Suspect due to persistent left intra-abdominal infection in setting of complex history below   ROS and exam otherwise negative   Recent Flu/RSV/COVID PCR, blood cultures CXR negative   Recurrent fever, WBC 1/11 likeyl due to persisent perihepatic collection  Status post drain upsizing 1/12, additional short course of antibiotics  Consider additional inflammatory response to large left pleural effusion, status post thoracentesis of simple fluid  Now improved  Rec:  • Continue to follow closely off antibitoics  • Follow temperatures closely  • Supportive care as per the primary service     Intra-abdominal abscess     In the setting complex surgical history as below   Initially developed 11/2022 accompanied by ESBL E  coli bacteremia   Status post exploratory laparotomy, right hemicolectomy, temporary abdominal closure 11/16; re-exploration, partial left colectomy, primary ileocolonic anastomosis with proximal diverting loop ileostomy, midline fascial closure on 11/17   More recently developed abscess in hepatectomy bed, also collection +/- leak at area of prior colonic anastamosis, 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   Status post IR drain into perihepatic collection 12/8   Cultures with ESBL E  Coli   Status post 7 days ertapenem after drain placement through 12/15   Developed recurrent sepsis and repeat CT 12/17 shows hepatic bed collection improved, persistent left intra-abdominal collection   Status post perigastic, perisplenic drains 12/20   Cultures again with ESBL E  Coli   Status post 21 days total antibiotics, completed 1/7   Recurrent fever, WBC as above 1/10 in setting of decreased drain output, worsening left hepatic collection  Status post drain upsizing 1/12/23      Rec:  • Continue to follow closely off antibiotics  • Drain management per surgery and IR, drain check scheduled 2023     Possible acute cholecystitis     Status post percutaneous cholecystostomy tube   Alk phos remains markedly elevated, possibly due to drain itself versus known intrahepatic metastases      Metastatic colon cancer     To liver, possible lung   Status post resection, chemotherapy, more recent hepatic resection and ablation, transverse colon resection      CKD     Baseline Cr 1 4       DM      Anemia     Status post multiple transfusions  Pleural effusion  Status post thoracentesis of simple fluid  Likely reactive  The above plan was discussed in detail with the patient, his wife at the bedside, Tyrese Calvillo and Torie Crawford, and Neptali Arteaga  The patient is stable from an ID standpoint for D/C home      Antibiotics:  Off antibiotics #1    Subjective:  Patient seen on AM rounds  Denies fevers, chills, sweats, nausea, vomiting, or diarrhea  24 Hour Events:  No documented fevers, chills, sweats, nausea, vomiting, or diarrhea  Ready for D/C home today  Objective:  Vitals:  Temp:  [97 6 °F (36 4 °C)-98 6 °F (37 °C)] 98 6 °F (37 °C)  HR:  [] 85  Resp:  [17-20] 20  BP: (110-151)/(78-90) 124/80  SpO2:  [98 %-99 %] 98 %  Temp (24hrs), Av 1 °F (36 7 °C), Min:97 6 °F (36 4 °C), Max:98 6 °F (37 °C)  Current: Temperature: 98 6 °F (37 °C)    Physical Exam:   General:  No acute distress  Psychiatric:  Awake and alert  Pulmonary:  Normal respiratory excursion without accessory muscle use  Abdomen:  Soft, nontender  Extremities:  No edema  Skin:  No rashes    Lab Results:  I have personally reviewed pertinent labs    Results from last 7 days   Lab Units 23  1143 23  0621 23  0452   POTASSIUM mmol/L 3 6 3 6 3 9   CHLORIDE mmol/L 109* 111* 107   CO2 mmol/L 20* 21 20*   BUN mg/dL 15 14 16   CREATININE mg/dL 1 12 1 09 1 00   EGFR ml/min/1 73sq m 72 74 82   CALCIUM mg/dL 8 5 8 4 8 5   AST U/L 43  --   -- ALT U/L 12  --   --    ALK PHOS U/L 1,033*  --   --      Results from last 7 days   Lab Units 01/16/23  1143 01/14/23  0621 01/13/23  0530   WBC Thousand/uL 10 52* 8 26 10 94*   HEMOGLOBIN g/dL 8 9* 7 3* 7 4*   PLATELETS Thousands/uL 350 322 362     Results from last 7 days   Lab Units 01/12/23  1512 01/10/23  2125   BLOOD CULTURE   --  No Growth After 5 Days  No Growth After 5 Days  GRAM STAIN RESULT  4+ Polys  No bacteria seen  --    BODY FLUID CULTURE, STERILE  No growth  --        Imaging Studies:   I have personally reviewed pertinent imaging study reports and images in PACS  EKG, Pathology, and Other Studies:   I have personally reviewed pertinent reports

## 2023-01-17 NOTE — PROGRESS NOTES
Progress Note - General Surgery   Daisy Mcgee 61 y o  male MRN: [id number]  Unit/Bed#: [facility] 414-40 Encounter: [id number]    Assessment:  61 y o  M with PMHx of metastatic colon cancer s/p exploratory laparotomy, hepatic segment 3 resection, transverse colectomy and reversal of loop colostomy on 10/1, complicated by an anastomotic leak requiring re-exploration, right hemicolectomy 11/16 and subsequent primary ileocolonic anastomosis with diverting loop ileostomy 11/17  Patient was readmitted for acute cholecystitis s/p percutaneous cholecystostomy tube placement on 12/8, IR drainage of intra-abdominal fluid collections on 12/20, with upsizing/repositioning of drains on 1/3 and 1/12     Afebrile  VSS  On room air      Perc radha tube: 0 cc recorded, blood tinged drainage in bag  LUQ VICTORINO drains x 3 with 1,5,2 5 cc thick cloudy drainage   Ileostomy 325 cc formed stool and gas  Plan:  Diet as tolerated + Ensure  Maintain drains, monitor output   Abx course completed yesterday per ID  Continue local wound care to midline wound   OOB/Ambulate  PT/OT  Anticipate d/c home today per [facility]C      Subjective/Objective     Subjective: No acute events overnight  Tolerating diet without nausea/vomiting  No fevers, chills  Objective:     Blood pressure 110/80, pulse 98, temperature 98 1 °F (36 7 °C), temperature source Axillary, resp  rate 20, height 5' 10" (1 778 m), weight 85 2 kg (187 lb 13 3 oz), SpO2 99 %  ,Body mass index is 26 95 kg/m²        Intake/Output Summary (Last 24 hours) at 1/17/2023 0539  Last data filed at 1/16/2023 2143  Gross per 24 hour   Intake 10 ml   Output 776 5 ml   Net -766 5 ml       Invasive Devices     Central Venous Catheter Line  Duration           Port A Cath 03/10/22 Right Chest 312 days          Drain  Duration           Ileostomy LUQ 60 days    Cholecystostomy Tube 35 days    Abscess Drain Abdomen 27 days    Abscess Drain Back 27 days    Abscess Drain Abdomen 4 days                Physical Exam:   NAD, alert and oriented x3  Normocephalic, atraumatic  Moist mucous membranes   Norm resp effort on room air   Regular rate  Abd soft, NT/ND, ileostomy is pink and viable with liquid stool and gas in bag   LUQ drains x 3 with thick cloudy drainage (minimal)  Perc radha in place with minimal blood tinged drainage   Midline wound with pink healthy granulation tissue at wound base     No calf tenderness or peripheral edema  CN grossly intact   Skin is warm and dry      Lab, Imaging and other studies:  CBC:   Lab Results   Component Value Date    WBC 10 52 (H) 01/16/2023    HGB 8 9 (L) 01/16/2023    HCT 28 0 (L) 01/16/2023    MCV 83 01/16/2023     01/16/2023    MCH 26 5 (L) 01/16/2023    MCHC 31 8 01/16/2023    RDW 17 2 (H) 01/16/2023    MPV 9 3 01/16/2023    NRBC 0 01/16/2023   , CMP:   Lab Results   Component Value Date    SODIUM 135 01/16/2023    K 3 6 01/16/2023     (H) 01/16/2023    CO2 20 (L) 01/16/2023    BUN 15 01/16/2023    CREATININE 1 12 01/16/2023    CALCIUM 8 5 01/16/2023    AST 43 01/16/2023    ALT 12 01/16/2023    ALKPHOS 1,033 (H) 01/16/2023    EGFR 72 01/16/2023     VTE Pharmacologic Prophylaxis: Sequential compression device (Venodyne)   VTE Mechanical Prophylaxis: sequential compression device

## 2023-01-17 NOTE — PLAN OF CARE
Problem: Prexisting or High Potential for Compromised Skin Integrity  Goal: Skin integrity is maintained or improved  Description: INTERVENTIONS:  - Identify patients at risk for skin breakdown  - Assess and monitor skin integrity  - Assess and monitor nutrition and hydration status  - Monitor labs   - Assess for incontinence   - Turn and reposition patient  - Assist with mobility/ambulation  - Relieve pressure over bony prominences  - Avoid friction and shearing  - Provide appropriate hygiene as needed including keeping skin clean and dry  - Evaluate need for skin moisturizer/barrier cream  - Collaborate with interdisciplinary team   - Patient/family teaching  - Consider wound care consult   Outcome: Progressing     Problem: Potential for Falls  Goal: Patient will remain free of falls  Description: INTERVENTIONS:  - Educate patient/family on patient safety including physical limitations  - Instruct patient to call for assistance with activity   - Consult OT/PT to assist with strengthening/mobility   - Keep Call bell within reach  - Keep bed low and locked with side rails adjusted as appropriate  - Keep care items and personal belongings within reach  - Initiate and maintain comfort rounds  - Make Fall Risk Sign visible to staff  - Offer Toileting every 2-4 Hours, in advance of need  - Initiate/Maintain bed/chair alarm  - Obtain necessary fall risk management equipment: nonskid footwear   - Apply yellow socks and bracelet for high fall risk patients  - Consider moving patient to room near nurses station  Outcome: Progressing     Problem: PAIN - ADULT  Goal: Verbalizes/displays adequate comfort level or baseline comfort level  Description: Interventions:  - Encourage patient to monitor pain and request assistance  - Assess pain using appropriate pain scale  - Administer analgesics based on type and severity of pain and evaluate response  - Implement non-pharmacological measures as appropriate and evaluate response  - Consider cultural and social influences on pain and pain management  - Notify physician/advanced practitioner if interventions unsuccessful or patient reports new pain  Outcome: Progressing     Problem: INFECTION - ADULT  Goal: Absence or prevention of progression during hospitalization  Description: INTERVENTIONS:  - Assess and monitor for signs and symptoms of infection  - Monitor lab/diagnostic results  - Monitor all insertion sites, i e  indwelling lines, tubes, and drains  - Monitor endotracheal if appropriate and nasal secretions for changes in amount and color  - Eagle appropriate cooling/warming therapies per order  - Administer medications as ordered  - Instruct and encourage patient and family to use good hand hygiene technique  - Identify and instruct in appropriate isolation precautions for identified infection/condition  Outcome: Progressing     Problem: SAFETY ADULT  Goal: Patient will remain free of falls  Description: INTERVENTIONS:  - Educate patient/family on patient safety including physical limitations  - Instruct patient to call for assistance with activity   - Consult OT/PT to assist with strengthening/mobility   - Keep Call bell within reach  - Keep bed low and locked with side rails adjusted as appropriate  - Keep care items and personal belongings within reach  - Initiate and maintain comfort rounds  - Make Fall Risk Sign visible to staff  - Offer Toileting every 2-4 Hours, in advance of need  - Initiate/Maintain bed/chair alarm  - Obtain necessary fall risk management equipment: nonskid footwear  - Apply yellow socks and bracelet for high fall risk patients  - Consider moving patient to room near nurses station  Outcome: Progressing  Goal: Maintain or return to baseline ADL function  Description: INTERVENTIONS:  -  Assess patient's ability to carry out ADLs; assess patient's baseline for ADL function and identify physical deficits which impact ability to perform ADLs (bathing, care of mouth/teeth, toileting, grooming, dressing, etc )  - Assess/evaluate cause of self-care deficits   - Assess range of motion  - Assess patient's mobility; develop plan if impaired  - Assess patient's need for assistive devices and provide as appropriate  - Encourage maximum independence but intervene and supervise when necessary  - Involve family in performance of ADLs  - Assess for home care needs following discharge   - Consider OT consult to assist with ADL evaluation and planning for discharge  - Provide patient education as appropriate  Outcome: Progressing  Goal: Maintains/Returns to pre admission functional level  Description: INTERVENTIONS:  - Perform BMAT or MOVE assessment daily    - Set and communicate daily mobility goal to care team and patient/family/caregiver  - Collaborate with rehabilitation services on mobility goals if consulted  - Perform Range of Motion 3 times a day  - Reposition patient every 2 hours    - Dangle patient 3 times a day  - Stand patient 3 times a day  - Ambulate patient 3 times a day  - Out of bed to chair 3 times a day   - Out of bed for meals 3 times a day  - Out of bed for toileting  - Record patient progress and toleration of activity level   Outcome: Progressing     Problem: DISCHARGE PLANNING  Goal: Discharge to home or other facility with appropriate resources  Description: INTERVENTIONS:  - Identify barriers to discharge w/patient and caregiver  - Arrange for needed discharge resources and transportation as appropriate  - Identify discharge learning needs (meds, wound care, etc )  - Arrange for interpretive services to assist at discharge as needed  - Refer to Case Management Department for coordinating discharge planning if the patient needs post-hospital services based on physician/advanced practitioner order or complex needs related to functional status, cognitive ability, or social support system  Outcome: Progressing     Problem: Nutrition/Hydration-ADULT  Goal: Nutrient/Hydration intake appropriate for improving, restoring or maintaining nutritional needs  Description: Monitor and assess patient's nutrition/hydration status for malnutrition  Collaborate with interdisciplinary team and initiate plan and interventions as ordered  Monitor patient's weight and dietary intake as ordered or per policy  Utilize nutrition screening tool and intervene as necessary  Determine patient's food preferences and provide high-protein, high-caloric foods as appropriate       INTERVENTIONS:  - Monitor oral intake, urinary output, labs, and treatment plans  - Assess nutrition and hydration status and recommend course of action  - Evaluate amount of meals eaten  - Assist patient with eating if necessary   - Allow adequate time for meals  - Recommend/ encourage appropriate diets, oral nutritional supplements, and vitamin/mineral supplements  - Order, calculate, and assess calorie counts as needed  - Recommend, monitor, and adjust tube feedings and TPN/PPN based on assessed needs  - Assess need for intravenous fluids  - Provide specific nutrition/hydration education as appropriate  - Include patient/family/caregiver in decisions related to nutrition  Outcome: Progressing     Problem: MOBILITY - ADULT  Goal: Maintain or return to baseline ADL function  Description: INTERVENTIONS:  -  Assess patient's ability to carry out ADLs; assess patient's baseline for ADL function and identify physical deficits which impact ability to perform ADLs (bathing, care of mouth/teeth, toileting, grooming, dressing, etc )  - Assess/evaluate cause of self-care deficits   - Assess range of motion  - Assess patient's mobility; develop plan if impaired  - Assess patient's need for assistive devices and provide as appropriate  - Encourage maximum independence but intervene and supervise when necessary  - Involve family in performance of ADLs  - Assess for home care needs following discharge   - Consider OT consult to assist with ADL evaluation and planning for discharge  - Provide patient education as appropriate  Outcome: Progressing  Goal: Maintains/Returns to pre admission functional level  Description: INTERVENTIONS:  - Perform BMAT or MOVE assessment daily    - Set and communicate daily mobility goal to care team and patient/family/caregiver  - Collaborate with rehabilitation services on mobility goals if consulted  - Perform Range of Motion 3 times a day  - Reposition patient every 2 hours    - Dangle patient 3 times a day  - Stand patient 3 times a day  - Ambulate patient 3 times a day  - Out of bed to chair 3 times a day   - Out of bed for meals 3 times a day  - Out of bed for toileting  - Record patient progress and toleration of activity level   Outcome: Progressing

## 2023-01-17 NOTE — PROGRESS NOTES
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  • s/p hepatic resection and reversal of colostomy on 11/7  • s/p right hemicolectomy with partial descending colectomy colocolonic anastomosis with loop ileostomy and mid line abdominal VAC placement 11/16  • CT  Abd/pelvis 12/17 = loculated collection along splenic recess   Has perisplenic hilum collection as well --> to IR 12/20 for drain placement in both perigastric and perisplenic area and cx sent from both areas = Ecoli  • Tube check (incl perc radha tube) 1/3 = unchanged with still some fluid around each drain so kept in     • On 1/10/23, WBC jumped, febrile to 102 4 --> Ertapenem restarted  BC NG 1/10  • CT A/P showed a slightly enlarging perihepatic collection near segment 3 --> #2 drain upsized 1/12/23 and drained 40ml purulent fluid  • Next tube check for @1/26/23 with potential to remove left JPs #3 and #4  • S-O and WC following  • VICTORINO output = 1/5/2 5 last 24 hrs     Acute cholecystitis  • s/p percutaneous tube placement 12/8 with tube check on 12/12, 12/14  • GI saw due to alk phos elevation = felt 2/2 infiltrative disease rather than focal biliary obstruction   AMA was negative     • Alkaline phosphatase isoenzyme sent 12/17 (48% liver/52% bone) = GI  recommended continuing to monitor his LFTs and defer to surgical oncology for further w/u and plan  • TB normalized  • GI wrote note 1/4 = aware labs incl GGT of 1992 (previously 4387 on 12/17)     • Per GI, they wanted cholangiogram with next tube check since wasn't done 1/3 but d/w Dr Shanita Rojas and he did not want done with tube check 1/12/23    • Per radha tube w/o measurable drainage last 24 hrs and 23 ml on 1/16     Bacteremia/abdominal abscess  • ID has been following   • s/p Ertapenem was switched to Doxycycline per ID as of 12/30 but had to be switched back 2/2 nausea  • Ertapenem restarted 1/10/23 through 1/16  • Is not going home on any antibiotic per ID     Hypertension  • Stable on Norvasc 5mg BID  • No changes todaay     Diabetes mellitus  • Home:  Glargine 35U qam/Glyburide-Metformin 5-500 qam/Januvia 100mg qam  • Here:  Lantus 10U qhs/Lispro 4U TID  • For home, if he does not eat = hold Lispro WM dose   • Has a DEXCOM meter and a regular meter at home  • Continue DM diet and QID Accuchecks/SSI  • Stable and has been eating well recently     Acute on chronic renal failure  • Stage III; baseline 1 2-1 4  • Lisinopril currently on hold  • Creat improved s/p IVF     Anemia  • Multifactorial due to recent infection, surgery, CKD stage III  • Transfused on 12/15, 12/16, 12/30, 1/15  • Feels better today after yesterdays transfusion     Atypical chest pain  • With elevation in troponin/EKG changes on acute side  • Cardiology saw and did not recommend any further work up  • No further chest pain     Situational depression  • Neuropsychology consulted  • Cymbalta started today 1/16 per Palliative care     Malnutrition  • Does not like supplements  • Germ[place] ct 1/13 = 1294 calories     Left pleural effusion  • CXR on 12/14 showed small left pleural effusion and was suspect for CHF  • ECHO 11/9/22 = LVEF 55%, unable to assess diastolic function, mild TR  • CT on 1/10 showed mod-lg left pl effusion and had thoracentesis 1/12 for 300ml  • Cyto from pleural fluid was negative for malignancy; cx = NG  • Do CXR PA/LAT week of 1/30/23 as OP        Discharge date:  TBA       The above assessment and plan was reviewed and updated as determined by my evaluation of the patient on 1/17/2023      Labs:   Results from last 7 days   Lab Units 01/16/23  1143 01/14/23  0621   WBC Thousand/uL 10 52* 8 26   HEMOGLOBIN g/dL 8 9* 7 3*   HEMATOCRIT % 28 0* 23 5*   PLATELETS Thousands/uL 350 322 Results from last 7 days   Lab Units 01/16/23  1143 01/14/23  0621   SODIUM mmol/L 135 139   POTASSIUM mmol/L 3 6 3 6   CHLORIDE mmol/L 109* 111*   CO2 mmol/L 20* 21   BUN mg/dL 15 14   CREATININE mg/dL 1 12 1 09   CALCIUM mg/dL 8 5 8 4             Results from last 7 days   Lab Units 01/17/23  1041 01/17/23  0626 01/16/23  1936   POC GLUCOSE mg/dl 135 109 120       Review of Scheduled Meds:  Current Facility-Administered Medications   Medication Dose Route Frequency Provider Last Rate   • acetaminophen  325 mg Oral Q4H PRN Isaias Zaragoza MD     • acetaminophen  975 mg Oral Q8H [address] GAURANG Keith     • amLODIPine  5 mg Oral BID GAURANG Keith     • aspirin  81 mg Oral Daily GAURANG Keith     • atorvastatin  40 mg Oral Daily GAURANG Keith     • calcium carbonate  500 mg Oral BID PRN GAURANG Barrow     • cholecalciferol  400 Units Oral Daily GAURANG Keith     • collagenase   Topical Daily Isaias Zaragoza MD     • DULoxetine  30 mg Oral Daily Katerin Kothari, DO     • heparin (porcine)  5,000 Units Subcutaneous Q8H [address] GAURANG Keith     • insulin glargine  10 Units Subcutaneous HS GAURANG Foreman     • insulin lispro  1-5 Units Subcutaneous HS GAURANG Keith     • insulin lispro  1-6 Units Subcutaneous TID AC GAURANG Keith     • insulin lispro  4 Units Subcutaneous TID With Meals GAURANG Jo     • iohexol  35 mL Oral 90 min pre-procedure GAURANG Jo     • melatonin  3 mg Oral HS GAURANG Keith     • methocarbamol  500 mg Oral BID GAURANG Barrow     • multivitamin-minerals  1 tablet Oral Daily GAURANG Keith     • ondansetron  4 mg Oral Q6H PRN GAURANG Keith     • ondansetron  4 mg Oral BID GAURANG Jo     • oxyCODONE  10 mg Oral Q6H PRN GAURANG Barrow     • oxyCODONE  2 5 mg Oral Q3H PRN Jacqualine Adjutant, CRNP     • oxyCODONE  5 mg Oral Q6H PRN Jacqualine Adjutant, CRNP     • pantoprazole  40 mg Oral BID AC GAURANG Keith     • simethicone  80 mg Oral 4x Daily (with meals and at bedtime) Maceo Lanes, CRNP     • tamsulosin  0 4 mg Oral Daily With Dinner Maceo Lanes, CRNP         Subjective/ HPI: Patient seen and examined  Patients overnight issues or events were reviewed with nursing or staff during rounds or morning huddle session  New or overnight issues include the following:     No new or overnight issues  Offers no complaints  Very happy to be going home today    ROS:   A 10 point ROS was performed; negative except as noted above  *Labs /Radiology studies reviewed  *Medications reviewed and reconciled as needed  *Please refer to order section for additional ordered labs studies  *Case discussed with primary attending during morning huddle case rounds    Physical Examination:  Vitals:   Vitals:    01/16/23 1415 01/16/23 2129 01/17/23 0629 01/17/23 0822   BP: 127/78 110/80 151/90 124/80   BP Location: Left arm Left arm Left arm    Pulse: (!) 110 98 85    Resp: 17 20 20    Temp: 97 6 °F (36 4 °C) 98 1 °F (36 7 °C) 98 6 °F (37 °C)    TempSrc: Oral Axillary Oral    SpO2: 99% 99% 98%    Weight:       Height:           General Appearance: no distress, conversive  HEENT: PERRLA, conjuctiva normal; oropharynx clear; mucous membranes moist   Neck:  Supple, normal ROM  Lungs: CTA with dec BS RLL, normal respiratory effort, no retractions, expiratory effort normal   On RA  CV: regular rate and rhythm; no rubs/murmurs/gallops, PMI normal   ABD: soft; ND/NT; +BS  Very tiny amt SS drainage in drains #3 and #4 with milky tan drainage in #2  Midline abd incision has some slough present as before --> no drainage  EXT: no edema  Skin: normal turgor, normal texture, no rashes  Psych: affect normal, mood normal  Neuro: AAO      The above physical exam was reviewed and updated as determined by my evaluation of the patient on 1/17/2023      Invasive Devices     Central Venous Catheter Line Duration           Port A Cath 03/10/22 Right Chest 313 days          Drain  Duration           Ileostomy LUQ 60 days    Cholecystostomy Tube 35 days    Abscess Drain Abdomen 27 days    Abscess Drain Back 27 days    Abscess Drain Abdomen 4 days                   VTE Pharmacologic Prophylaxis: Heparin  Code Status: Level 1 - Full Code  Current Length of Stay: 34 day(s)      Total time spent:  30 minutes with more than 50% spent counseling/coordinating care  Counseling includes discussion with patient re: progress  and discussion with patient of his/her current medical state/information  Coordination of patient's care was performed in conjunction with primary service  Time invested included review of patient's labs, vitals, and management of their comorbidities with continued monitoring  In addition, this patient was discussed with medical team including physician and advanced extenders  The care of the patient was extensively discussed and appropriate treatment plan was formulated unique for this patient  Medical decision making for the day was made by supervising physician unless otherwise noted in their attestation statement  ** Please Note:  voice to text software may have been used in the creation of this document   Although proof errors in transcription or interpretation are a potential of such software**

## 2023-01-17 NOTE — PROGRESS NOTES
Progress note - Palliative and Supportive Care   Beth Cooley 61 y o  male [id number]    Patient Active Problem List   Diagnosis   • Type 2 diabetes mellitus without complication, with long-term current use of insulin (HCC)   • Benign essential hypertension   • Mixed hyperlipidemia   • Erectile dysfunction   • Low testosterone   • Obesity (BMI 30-39  9)   • Testicular hypogonadism   • Incarcerated umbilical hernia   • Left ureteral calculus   • Type 2 diabetes mellitus with hyperlipidemia (HCC)   • Microcytic anemia   • Transaminitis   • Thrombocytosis   • Iron deficiency anemia, unspecified   • Malignant neoplasm of transverse colon (HCC)   • 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)   • Colon cancer metastasized to liver [facility])   • Colostomy prolapse (HCC)   • Other fatigue   • Cervical radiculopathy   • Encephalopathy   • Flash pulmonary edema (HCC)   • MR (mitral regurgitation)   • Bacteremia   • ESBL (extended spectrum beta-lactamase) producing bacteria infection   • Acute respiratory failure with hypoxia (HCC)   • Severe protein-calorie malnutrition (HCC)   • Acute on chronic kidney failure (HCC)   • Sepsis (HCC)   • Abscess   • Acute pain   • Leukocytosis   • Dehiscence of incision   • Elevated alkaline phosphatase level     Active issues specifically addressed today include:   Metastatic colon cancer status post liver resection and multiple operative interventions now in acute rehab for 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  Neuropathic pain syndrome  Depression due to adjustment disorder  Existential distress  Palliative care patient    Plan:  1  Symptom management - continue cymbalta   -     2  Goals - Full cares  Discharge home today  Will arrange outpatient palliative follow up for ongoing support    -    3  Psychosocial support- Time spent providing supportive listening    -    4   PSC follow-up- Outpatient follow-up being scheduled       Code Status: Full - Level 1   Decisional apparatus:  Patient is competent on my exam today  If competence is lost, patient's substitute decision maker would default to spouse by [place]  Advance Directive / Living Will / POLST:  no    Interval history:       Patient seen with ARC team for discharge instructions  He is excited to go home and is looking forward to continued improvement  MEDICATIONS / ALLERGIES:     all current active meds have been reviewed    Allergies   Allergen Reactions   • Shellfish-Derived Products - Food Allergy Anaphylaxis   • Erythromycin GI Intolerance       OBJECTIVE:    Physical Exam  Physical Exam  Vitals and nursing note reviewed  Constitutional:       General: He is not in acute distress  Appearance: He is ill-appearing  HENT:      Head: Normocephalic and atraumatic  Right Ear: External ear normal       Left Ear: External ear normal       Nose: Nose normal    Eyes:      General: No scleral icterus  Right eye: No discharge  Left eye: No discharge  Cardiovascular:      Rate and Rhythm: Normal rate and regular rhythm  Pulmonary:      Effort: Pulmonary effort is normal  No respiratory distress  Breath sounds: Normal breath sounds  Abdominal:      General: Bowel sounds are normal  There is no distension  Palpations: Abdomen is soft  Skin:     General: Skin is warm and dry  Coloration: Skin is pale  Neurological:      Mental Status: He is alert and oriented to person, place, and time  Psychiatric:         Mood and Affect: Mood normal          Behavior: Behavior normal            Lab Results: I have personally reviewed pertinent labs  , CBC: No results found for: WBC, HGB, HCT, MCV, PLT, ADJUSTEDWBC, MCH, MCHC, RDW, MPV, NRBC, CMP: No results found for: SODIUM, K, CL, CO2, ANIONGAP, BUN, CREATININE, GLUCOSE, CALCIUM, AST, ALT, ALKPHOS, PROT, BILITOT, EGFR, PT/PTT:No results found for: PT, PTT  Imaging Studies: reviewed  EKG, Pathology, and Other Studies: reviewed    Counseling / Coordination of Care    Total floor / unit time spent today 25+ minutes  Greater than 50% of total time was spent with the patient and / or family counseling and / or coordination of care  A description of the counseling / coordination of care: chart review, medication review, discharge planning and follow up  Portions of this document may have been created using dictation software and as such some "sound alike" terms may have been generated by the system  Do not hesitate to contact me with any questions or clarifications

## 2023-01-17 NOTE — PLAN OF CARE
Problem: Prexisting or High Potential for Compromised Skin Integrity  Goal: Skin integrity is maintained or improved  Description: INTERVENTIONS:  - Identify patients at risk for skin breakdown  - Assess and monitor skin integrity  - Assess and monitor nutrition and hydration status  - Monitor labs   - Assess for incontinence   - Turn and reposition patient  - Assist with mobility/ambulation  - Relieve pressure over bony prominences  - Avoid friction and shearing  - Provide appropriate hygiene as needed including keeping skin clean and dry  - Evaluate need for skin moisturizer/barrier cream  - Collaborate with interdisciplinary team   - Patient/family teaching  - Consider wound care consult   1/17/2023 1032 by Tammy Carr LPN  Outcome: Adequate for Discharge  1/17/2023 1031 by Tammy Carr LPN  Outcome: Progressing     Problem: Potential for Falls  Goal: Patient will remain free of falls  Description: INTERVENTIONS:  - Educate patient/family on patient safety including physical limitations  - Instruct patient to call for assistance with activity   - Consult OT/PT to assist with strengthening/mobility   - Keep Call bell within reach  - Keep bed low and locked with side rails adjusted as appropriate  - Keep care items and personal belongings within reach  - Initiate and maintain comfort rounds  - Make Fall Risk Sign visible to staff  - Offer Toileting every 2-4 Hours, in advance of need  - Initiate/Maintain bed/chair alarm  - Obtain necessary fall risk management equipment: nonskid footwear   - Apply yellow socks and bracelet for high fall risk patients  - Consider moving patient to room near nurses station  1/17/2023 1032 by Tammy Carr LPN  Outcome: Adequate for Discharge  1/17/2023 1031 by Tammy Carr LPN  Outcome: Progressing     Problem: PAIN - ADULT  Goal: Verbalizes/displays adequate comfort level or baseline comfort level  Description: Interventions:  - Encourage patient to monitor pain and request assistance  - Assess pain using appropriate pain scale  - Administer analgesics based on type and severity of pain and evaluate response  - Implement non-pharmacological measures as appropriate and evaluate response  - Consider cultural and social influences on pain and pain management  - Notify physician/advanced practitioner if interventions unsuccessful or patient reports new pain  1/17/2023 1032 by Sharron Tinoco LPN  Outcome: Adequate for Discharge  1/17/2023 1031 by Sharron Tinoco LPN  Outcome: Progressing     Problem: INFECTION - ADULT  Goal: Absence or prevention of progression during hospitalization  Description: INTERVENTIONS:  - Assess and monitor for signs and symptoms of infection  - Monitor lab/diagnostic results  - Monitor all insertion sites, i e  indwelling lines, tubes, and drains  - Monitor endotracheal if appropriate and nasal secretions for changes in amount and color  - [place] appropriate cooling/warming therapies per order  - Administer medications as ordered  - Instruct and encourage patient and family to use good hand hygiene technique  - Identify and instruct in appropriate isolation precautions for identified infection/condition  1/17/2023 1032 by Sharron Tinoco LPN  Outcome: Adequate for Discharge  1/17/2023 1031 by Sharron Tinoco LPN  Outcome: Progressing     Problem: SAFETY ADULT  Goal: Patient will remain free of falls  Description: INTERVENTIONS:  - Educate patient/family on patient safety including physical limitations  - Instruct patient to call for assistance with activity   - Consult OT/PT to assist with strengthening/mobility   - Keep Call bell within reach  - Keep bed low and locked with side rails adjusted as appropriate  - Keep care items and personal belongings within reach  - Initiate and maintain comfort rounds  - Make Fall Risk Sign visible to staff  - Offer Toileting every 2-4 Hours, in advance of need  - Initiate/Maintain bed/chair alarm  - Obtain necessary fall risk management equipment: nonskid footwear  - Apply yellow socks and bracelet for high fall risk patients  - Consider moving patient to room near nurses station  1/17/2023 1032 by Madie Elias LPN  Outcome: Adequate for Discharge  1/17/2023 1031 by Madie Elias LPN  Outcome: Progressing  Goal: Maintain or return to baseline ADL function  Description: INTERVENTIONS:  -  Assess patient's ability to carry out ADLs; assess patient's baseline for ADL function and identify physical deficits which impact ability to perform ADLs (bathing, care of mouth/teeth, toileting, grooming, dressing, etc )  - Assess/evaluate cause of self-care deficits   - Assess range of motion  - Assess patient's mobility; develop plan if impaired  - Assess patient's need for assistive devices and provide as appropriate  - Encourage maximum independence but intervene and supervise when necessary  - Involve family in performance of ADLs  - Assess for home care needs following discharge   - Consider OT consult to assist with ADL evaluation and planning for discharge  - Provide patient education as appropriate  1/17/2023 1032 by Madie Elias LPN  Outcome: Adequate for Discharge  1/17/2023 1031 by Madie Elias LPN  Outcome: Progressing  Goal: Maintains/Returns to pre admission functional level  Description: INTERVENTIONS:  - Perform BMAT or MOVE assessment daily    - Set and communicate daily mobility goal to care team and patient/family/caregiver  - Collaborate with rehabilitation services on mobility goals if consulted  - Perform Range of Motion 3 times a day  - Reposition patient every 2 hours    - Dangle patient 3 times a day  - Stand patient 3 times a day  - Ambulate patient 3 times a day  - Out of bed to chair 3 times a day   - Out of bed for meals 3 times a day  - Out of bed for toileting  - Record patient progress and toleration of activity level   1/17/2023 1032 by Madie Elias LPN  Outcome: Adequate for Discharge  1/17/2023 1031 by Refugio Ferreira LPN  Outcome: Progressing     Problem: DISCHARGE PLANNING  Goal: Discharge to home or other facility with appropriate resources  Description: INTERVENTIONS:  - Identify barriers to discharge w/patient and caregiver  - Arrange for needed discharge resources and transportation as appropriate  - Identify discharge learning needs (meds, wound care, etc )  - Arrange for interpretive services to assist at discharge as needed  - Refer to Case Management Department for coordinating discharge planning if the patient needs post-hospital services based on physician/advanced practitioner order or complex needs related to functional status, cognitive ability, or social support system  1/17/2023 1032 by Refugio Ferreira LPN  Outcome: Adequate for Discharge  1/17/2023 1031 by Refugio Ferreira LPN  Outcome: Progressing     Problem: Nutrition/Hydration-ADULT  Goal: Nutrient/Hydration intake appropriate for improving, restoring or maintaining nutritional needs  Description: Monitor and assess patient's nutrition/hydration status for malnutrition  Collaborate with interdisciplinary team and initiate plan and interventions as ordered  Monitor patient's weight and dietary intake as ordered or per policy  Utilize nutrition screening tool and intervene as necessary  Determine patient's food preferences and provide high-protein, high-caloric foods as appropriate       INTERVENTIONS:  - Monitor oral intake, urinary output, labs, and treatment plans  - Assess nutrition and hydration status and recommend course of action  - Evaluate amount of meals eaten  - Assist patient with eating if necessary   - Allow adequate time for meals  - Recommend/ encourage appropriate diets, oral nutritional supplements, and vitamin/mineral supplements  - Order, calculate, and assess calorie counts as needed  - Recommend, monitor, and adjust tube feedings and TPN/PPN based on assessed needs  - Assess need for intravenous fluids  - Provide specific nutrition/hydration education as appropriate  - Include patient/family/caregiver in decisions related to nutrition  1/17/2023 1032 by Jaspal Melendez LPN  Outcome: Adequate for Discharge  1/17/2023 1031 by Jaspal Melendez LPN  Outcome: Progressing     Problem: MOBILITY - ADULT  Goal: Maintain or return to baseline ADL function  Description: INTERVENTIONS:  -  Assess patient's ability to carry out ADLs; assess patient's baseline for ADL function and identify physical deficits which impact ability to perform ADLs (bathing, care of mouth/teeth, toileting, grooming, dressing, etc )  - Assess/evaluate cause of self-care deficits   - Assess range of motion  - Assess patient's mobility; develop plan if impaired  - Assess patient's need for assistive devices and provide as appropriate  - Encourage maximum independence but intervene and supervise when necessary  - Involve family in performance of ADLs  - Assess for home care needs following discharge   - Consider OT consult to assist with ADL evaluation and planning for discharge  - Provide patient education as appropriate  1/17/2023 1032 by Jaspal Melendez LPN  Outcome: Adequate for Discharge  1/17/2023 1031 by Jaspal Melendez LPN  Outcome: Progressing  Goal: Maintains/Returns to pre admission functional level  Description: INTERVENTIONS:  - Perform BMAT or MOVE assessment daily    - Set and communicate daily mobility goal to care team and patient/family/caregiver  - Collaborate with rehabilitation services on mobility goals if consulted  - Perform Range of Motion 3 times a day  - Reposition patient every 2 hours    - Dangle patient 3 times a day  - Stand patient 3 times a day  - Ambulate patient 3 times a day  - Out of bed to chair 3 times a day   - Out of bed for meals 3 times a day  - Out of bed for toileting  - Record patient progress and toleration of activity level   1/17/2023 1032 by Jaspal Melendez LPN  Outcome: Adequate for Discharge  1/17/2023 1031 by Liza Ahumada, LPN  Outcome: Progressing

## 2023-01-18 NOTE — PROGRESS NOTES
01/18/23 1519   Hello, [Guardian’s Name / Patient’s Calvin Lopez, this is [Caller Calvin Lopez from [facility], and our clinical care team wanted to check on you / your child after your recent visit to the hospital  It will only take 3-5 minutes  Is this a good time? Discharge Call Type/ Specific Diagnosis: General Call   Call Complete   Attempted Number of Calls 1   Discharge phone call complete?  Left Message

## 2023-01-19 NOTE — CASE MANAGEMENT
Team dc summary - pt made gains and was finally medically stable to return home  Spouse present for multiple training sessions and for dc instructions  [facility] will continue to provide rn, pt and ot serivces  Pt received a lightweight 18x18 w/c and a semi electric hospital bed with 4 half rails through [facility] prior to dc

## 2023-01-20 ENCOUNTER — TELEPHONE (OUTPATIENT)
Dept: HEMATOLOGY ONCOLOGY | Facility: CLINIC | Age: 58
End: 2023-01-20

## 2023-01-20 ENCOUNTER — PATIENT OUTREACH (OUTPATIENT)
Dept: CASE MANAGEMENT | Facility: HOSPITAL | Age: 58
End: 2023-01-20

## 2023-01-20 ENCOUNTER — PATIENT OUTREACH (OUTPATIENT)
Dept: HEMATOLOGY ONCOLOGY | Facility: CLINIC | Age: 58
End: 2023-01-20

## 2023-01-20 ENCOUNTER — TELEPHONE (OUTPATIENT)
Dept: INTERNAL MEDICINE CLINIC | Facility: CLINIC | Age: 58
End: 2023-01-20

## 2023-01-20 DIAGNOSIS — C78.7 COLON CANCER METASTASIZED TO LIVER (HCC): Primary | ICD-10-CM

## 2023-01-20 DIAGNOSIS — C18.9 COLON CANCER METASTASIZED TO LIVER (HCC): Primary | ICD-10-CM

## 2023-01-20 LAB
BASOPHILS # BLD AUTO: 0.1 X10E3/UL (ref 0–0.2)
BASOPHILS NFR BLD AUTO: 0 %
EOSINOPHIL # BLD AUTO: 0 X10E3/UL (ref 0–0.4)
EOSINOPHIL NFR BLD AUTO: 0 %
ERYTHROCYTE [DISTWIDTH] IN BLOOD BY AUTOMATED COUNT: 17.4 % (ref 11.6–15.4)
HCT VFR BLD AUTO: 29.4 % (ref 37.5–51)
HGB BLD-MCNC: 9.6 G/DL (ref 13–17.7)
IMM GRANULOCYTES # BLD: 0.3 X10E3/UL (ref 0–0.1)
IMM GRANULOCYTES NFR BLD: 1 %
LYMPHOCYTES # BLD AUTO: 1.8 X10E3/UL (ref 0.7–3.1)
LYMPHOCYTES NFR BLD AUTO: 9 %
MCH RBC QN AUTO: 27 PG (ref 26.6–33)
MCHC RBC AUTO-ENTMCNC: 32.7 G/DL (ref 31.5–35.7)
MCV RBC AUTO: 83 FL (ref 79–97)
MONOCYTES # BLD AUTO: 2.3 X10E3/UL (ref 0.1–0.9)
MONOCYTES NFR BLD AUTO: 11 %
NEUTROPHILS # BLD AUTO: 15.9 X10E3/UL (ref 1.4–7)
NEUTROPHILS NFR BLD AUTO: 79 %
PLATELET # BLD AUTO: 584 X10E3/UL (ref 150–450)
RBC # BLD AUTO: 3.55 X10E6/UL (ref 4.14–5.8)
WBC # BLD AUTO: 20.3 X10E3/UL (ref 3.4–10.8)

## 2023-01-20 RX ORDER — ATORVASTATIN CALCIUM 40 MG/1
40 TABLET, FILM COATED ORAL DAILY
Qty: 30 TABLET | Refills: 0 | Status: ON HOLD | OUTPATIENT
Start: 2023-01-20

## 2023-01-20 RX ORDER — OXYCODONE HYDROCHLORIDE 10 MG/1
10 TABLET ORAL 2 TIMES DAILY PRN
Qty: 40 TABLET | Refills: 0 | Status: SHIPPED | OUTPATIENT
Start: 2023-01-20 | End: 2023-01-30 | Stop reason: SDUPTHER

## 2023-01-20 RX ORDER — SODIUM CHLORIDE 9 MG/ML
10 INJECTION INTRAVENOUS DAILY
Qty: 300 ML | Refills: 0 | Status: ON HOLD | OUTPATIENT
Start: 2023-01-20

## 2023-01-20 RX ORDER — SIMETHICONE 80 MG
80 TABLET,CHEWABLE ORAL
Qty: 120 TABLET | Refills: 0 | Status: ON HOLD | OUTPATIENT
Start: 2023-01-20

## 2023-01-20 RX ORDER — PANTOPRAZOLE SODIUM 40 MG/1
40 TABLET, DELAYED RELEASE ORAL 2 TIMES DAILY
Qty: 60 TABLET | Refills: 0 | Status: ON HOLD | OUTPATIENT
Start: 2023-01-20

## 2023-01-20 RX ORDER — AMLODIPINE BESYLATE 5 MG/1
5 TABLET ORAL 2 TIMES DAILY
Qty: 60 TABLET | Refills: 0 | Status: SHIPPED | OUTPATIENT
Start: 2023-01-20 | End: 2023-02-02 | Stop reason: SDUPTHER

## 2023-01-20 RX ORDER — INSULIN LISPRO 100 [IU]/ML
4 INJECTION, SOLUTION INTRAVENOUS; SUBCUTANEOUS
Qty: 15 ML | Refills: 0 | Status: ON HOLD | OUTPATIENT
Start: 2023-01-20

## 2023-01-20 NOTE — TELEPHONE ENCOUNTER
Returned telephone call to patient in regards to patient not undergoing treatment PO or IV chemotherapy at this time  Reviewed if patient is having fevers and or discharge from any incisions or any s/s of infection PCP and or Dr Bret Kelly office should be notified  Patient agreed and hung up the phone within seconds of reviewing recommendations   Was not able to review that a message was sent to Dr Michael Addison and also Frankie Curry PA-C

## 2023-01-20 NOTE — TELEPHONE ENCOUNTER
Received vm from patient, "Hi, this is Danielle Loth  I'm a patient of [facility]  I just heard from my primary care physician and I've done a CBC yesterday  She told me to call [facility] immediately  My white blood count is extremely high  Can you please return my call, [phone]?"    Patient is not on treatment at this time  Patient is scheduled to see Dr Juan Lopez on 2/23/23  My chart message to patient to see if patient is having any s/s at this time

## 2023-01-20 NOTE — PROGRESS NOTES
Message left by patient regarding recent blood work (1/19/2023) which showed a very elevated WBC value of 20 3  I sent a high priority message to [facility] Dr Annetta Kc nurse  Called the patient to inform him of same and that [facility] will discuss with Dr Ida Freed   He thanked me for the call

## 2023-01-20 NOTE — PROGRESS NOTES
01/20/23 1009   Hello, [Guardian’s Name / Patient’s Kristin Hillman, this is [Caller Kristin Hillman from [facility], and our clinical care team wanted to check on you / your child after your recent visit to the hospital  It will only take 3-5 minutes  Is this a good time? Discharge Call Type/ Specific Diagnosis: General Call   Call Complete   Attempted Number of Calls 2   Discharge phone call complete?  Left Message

## 2023-01-20 NOTE — PROGRESS NOTES
Jignesh a message from pt late yesterday asking for a call back, called him this morning and we spoke briefly about his lengthy hospital and rehab stay  He says that he is so happy to be back home but finds himself very debilitated  He is focused on his nutrition and trying to rebuild his strength now, a visiting nurse was out to see him yesterday and he will have home PT and OT starting next week  For the time being, he is asking for a handicap placard for his wife's car  He shared that he had to go out for labs once so far and had a fall which required EMS to come and help him get back up  I did advise him to ask the home health RN if they can draw his labs, he thinks this may be an issue with his insurance but says that he will definitely look into this so that he can possibly eliminate trips out of the house for the time being  MSW completed portion A of the handicap placard form [id number], PA Georgia Vasquez did sign this for pt today as well  Called pt back to let him know that I have this for him, however he has to sign the bottom before it can be submitted  He asked that I leave it at the  and he will have his wife stop by to grab it most likely next week  I encouraged him to call me if he needs help with anything else and otherwise I hope to see him in the office soon

## 2023-01-20 NOTE — QUICK NOTE
Patient called missing some medications and needing prescriptions sent to pharmacy  Orders placed and confirmed with pharmacy      Iram Charles MD

## 2023-01-25 NOTE — PHYSICAL THERAPY NOTE
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  He presented to rehab S/p 11/7/2022 Exploratory Laparotomy, 11/16/2022 Right hemicolectomy,11/17/2022 Re-exploration, partial descending colectomy, primary colocolonic anastomosis created with diverting loop ileostomy, midline wound VAC placement,12/8/2022 IR Percutaneous Cholecystostomy Tube Placement, Hepatectomy Abscess Drain Placement  He presented with deficits which included fatigue requiring frequent rest breaks and decreased activity tolerance, decreased endurance, decreased LE strength, decreased distal sensation / peripheral neuropathy, poor sitting / standing tolerance, high BP and fall risk  He responded well to therapeutic activity and exercise, neuro re-ed and transfer, gait and stair training  He was able to progress to a S level using a RW, CGA on stairs, and (I) level with bed and w/c mobility  PT ordered hospital bed and w/c which was delivered to him during stay  Patient's family able to purchase a RW (I) on their own  Family training was performed; HEP provided; HHPT recommended and set up for patient at time of d/c by JIMENA Durant PT, DPT

## 2023-01-30 ENCOUNTER — TELEPHONE (OUTPATIENT)
Dept: HEMATOLOGY ONCOLOGY | Facility: CLINIC | Age: 58
End: 2023-01-30

## 2023-01-30 ENCOUNTER — TELEMEDICINE (OUTPATIENT)
Dept: PALLIATIVE MEDICINE | Facility: CLINIC | Age: 58
End: 2023-01-30

## 2023-01-30 DIAGNOSIS — C78.7 COLON CANCER METASTASIZED TO LIVER (HCC): ICD-10-CM

## 2023-01-30 DIAGNOSIS — R11.0 NAUSEA: ICD-10-CM

## 2023-01-30 DIAGNOSIS — Z71.89 COUNSELING AND COORDINATION OF CARE: Primary | ICD-10-CM

## 2023-01-30 DIAGNOSIS — C18.9 COLON CANCER METASTASIZED TO LIVER (HCC): ICD-10-CM

## 2023-01-30 DIAGNOSIS — F11.90 OPIOID USE: ICD-10-CM

## 2023-01-30 DIAGNOSIS — C18.4 MALIGNANT NEOPLASM OF TRANSVERSE COLON (HCC): Primary | ICD-10-CM

## 2023-01-30 DIAGNOSIS — G89.3 CANCER RELATED PAIN: ICD-10-CM

## 2023-01-30 RX ORDER — NALOXONE HYDROCHLORIDE 4 MG/.1ML
SPRAY NASAL
Qty: 1 EACH | Refills: 1 | Status: ON HOLD | OUTPATIENT
Start: 2023-01-30

## 2023-01-30 RX ORDER — OXYCODONE HYDROCHLORIDE 10 MG/1
10 TABLET ORAL EVERY 6 HOURS PRN
Qty: 90 TABLET | Refills: 0 | Status: ON HOLD | OUTPATIENT
Start: 2023-01-30

## 2023-01-30 NOTE — TELEPHONE ENCOUNTER
Received message from palliative care, Called patient he is experiencing pain around drains  He is still declining any fevers at this time  Patient stated that there is yellow/green drainage, patient stated that the wound does not have a lot of output at this time  Ridge Pardo RN with Dr Anusha Bliss that patient is to have CBCD done on 1/31/23 with Home Health nurse and if not able to patient has a drain check, possible to go to lab on 1/31/23  Patient appreciative and concerned

## 2023-01-30 NOTE — PROGRESS NOTES
Outpatient Follow-Up - Palliative and Supportive Care   Romy Weathers 61 y o  male [id number]    Assessment & Plan  Problem List Items Addressed This Visit    None      Medications adjusted this encounter:  Requested Prescriptions      No prescriptions requested or ordered in this encounter     No orders of the defined types were placed in this encounter  There are no discontinued medications  Romy Weathers was seen today for symptoms and planning cares related to above illnesses  I have reviewed the patient's controlled substance dispensing history in the Prescription Drug Monitoring Program in compliance with the [facility] regulations before prescribing any controlled substances  They are invited to continue to follow with us  If there are questions or concerns, please contact us through our clinic/answering service 24 hours a day, seven days a week  GAURANG Acosta  [facility] Palliative and Supportive Care        Visit Information    Accompanied By: {Palliative Care Visit Info; Accompanied by:[id number]}    Source of History: {Palliative Care Visit Info; Source of history:64428}    History Limitations: {Palliative Care Visit Info; History limitation:61578}    Contacts:{Palliative Care Visit Info; Contacts:25615}    Chief Complaint  No chief complaint on file  History of Present Illness      Romy Weathers is a 61 y o  male who presents in follow up of symptoms related to ***    Pertinent issues include: {Palliative Care Follow Up reasons:69319}    Recent hospital admission to [facility] 12/14/2022 to 01/17/2023  Copied from discharge summary:  Discharge Summary - iDna Cruz 61 y o  male MRN: [id number]  Unit/Bed#: -83 Encounter: [id number]     Admission Date: 12/14/2022      Discharge Date: 01/17/2023     Etiologic/Rehabilitation Diagnosis: Impairment of mobility, safety and Activities of Daily Living (ADLs) due to Neurologic Conditions: 03 8  Neuromuscular Disorders   Etiologic Dx: Critical Illness Myopathy  Date of Onset: 11/7/2022     Date of surgery:      11/7/2022 Exploratory Laparotomy, resection of hepatic segment 3, resection of transverse colon with reversal of loop colostomy  11/16/2022 Right hemicolectomy, left in discontinuity and temporary abdominal closure device placed  11/17/2022 Re-exploration, partial descending colectomy, primary colocolonic anastomosis created with diverting loop ileostomy, midline wound VAC placement  12/8/2022 IR Percutaneous Cholecystostomy Tube Placement, Hepatectomy Abscess Drain Placement  12/12/2022 IR Tube Check     HPI: iVc Mart is a 61 y o  male with a medical history of hypertension, hyperlipidemia, ventral hernia, GERD that presented to [address] [place] [address] on 11/7 for an elective surgical procedure due to metastatic colon adenocarcinoma by Dr Geovanna Rosen  Patient presented to hem/onc on 2/22 when CT of abdomen revealed transverse colonic apple core lesion and innumerable hepatic metastases  MRI revealed multiple hepatic metastasis with smaller lesions in left lobe with larger lesions on the right lobe  On 11/7, patient underwent exploratory laparotomy, segment 3 liver resection, ablation, intraoperative ultrasound of liver  This was complicated by a left upper quadrant hematoma, imaging showed suspected leak from transverse colon anastomosis  On 11/16 went to OR revealed left upper quadrant infected hematoma, defect in the transverse colon anastomosis with extravasation of succus  Massively dilated cecum requiring resection   He had a right hemicolectomy, left in discontinuity and temporary abdominal closure device was placed  On 11/17, he underwent re-exploration, partial descending colectomy, primary colonic anastomosis created with diverting loop ileostomy and midline wound VAC placement  He completed a prolonged course of IV antibiotics for ESBL bacteremia due to intra-abdominal abscess  On 12/3, patient was discharged to SNF for rehab  Patient was transferred back to hospital on 12/4 due to abdominal pain  He is unable to return to SNF due to concerns for pain management and management of acute medical needs  Per Surgery, patient is to continue with wet-to-dry dressings to midline abdominal incision, and packing to old stoma site  Patient has been observed off of antibiotics, with close monitoring of leukocytosis  On 12/6, patient was with noted increased creatinine level, and was administered 1L IV fluid bolus, with noted improvement  He then developed tachycardia, as well as increased abdominal pain, requiring additional IV fluid bolus  Abdominal incision was with noted yellow drainage  CT chest/abdomen/pelvis showed abdominal abscess and distended gallbladder  ID was consulted, and patient was continued on IV antibiotics (plan for 7 day course through 12/15)  Patient was taken to IR and underwent percutaneous cholecystostomy tube insertion and hepatectomy abscess drainage  Nephrology was consulted re: ALEXY, and initiated patient on Sodium Bicarb gtt as well as IV Albumin to assist with intravascular volume shifts, which as since been discontinued  On 12/14, patient with complaints of midline/left chestwall pain, right below IR abscess drain  Lasted for a few seconds and then resolved - suspected related to drain placement, however cardiac workup completed  Trop level was 57 and CXR showed progressive bilateral opacities suspicious for CHF and small left pleural effusion  Cardiology was consulted, with chest pain appearing musculoskeletal, and no further cardiac workup is warranted   Additionally, no diuresis or further intervention suggested for CXR findings  The patient was evaluated by the Rehabilitation team and deemed an appropriate candidate for comprehensive inpatient rehabilitation and admitted to the [facility] on 12/14/2022  3:18 PM     Procedures Performed During ARC Admission: None     Acute Rehabilitation Center Course: Patient participated in a comprehensive interdisciplinary inpatient rehabilitation program which included involvment of MD, therapies (PT, OT, and/or SLP), RN, CM, SW, dietary, and psychology services  He was able to be advanced to a modified independent level of assist and considered safe for discharge home  Past medical, surgical, social, and family histories are reviewed and pertinent updates are made  ROS      Vital Signs    There were no vitals taken for this visit  Physical Exam and Objective Data  Physical Exam      Radiology and Laboratory:  CT abdomen pelvis w contrast  Status: Final result     PACS Images     Show images for CT abdomen pelvis w contrast  Study Result    Narrative & Impression   CT ABDOMEN AND PELVIS WITH IV CONTRAST     INDICATION:   Sepsis  sepsis'      COMPARISON:  Comparison is made to prior studies, the most recent is dated December 8, 2022            TECHNIQUE:  CT examination of the abdomen and pelvis was performed  Axial, sagittal, and coronal 2D reformatted images were created from the source data and submitted for interpretation      Radiation dose length product (DLP) for this visit:  1807 57 mGy-cm     This examination, like all CT scans performed in the [facility], was performed utilizing techniques to minimize radiation dose exposure, including the use of   iterative reconstruction and automated exposure control      IV Contrast:  100 mL of iohexol (OMNIPAQUE)  Enteric Contrast:  Enteric contrast was not administered      FINDINGS:     ABDOMEN     LOWER CHEST:  Bilateral pleural effusions, slightly larger on the right when compared to prior study, with underlying compressive atelectasis      LIVER/BILIARY TREE:  Patient is status post segment 3 segmentectomy with a drainage catheter at the resected liver edge and marked decrease in the size of fluid collection      When compared to the prior study, the overall tumor burden is probably unchanged although more obvious on the current study due to difference in timing relative to the contrast bolus      GALLBLADDER:  Interval placement of a cholecystostomy catheter which is positioned in the gallbladder lumen  The gallbladder is decompressed      SPLEEN:  Perisplenic collections are unchanged      PANCREAS:  Unremarkable      ADRENAL GLANDS:  Unremarkable      KIDNEYS/URETERS:  No hydronephrosis      STOMACH AND BOWEL:  Postsurgical changes with left upper quadrant diverting ileostomy and what appears to be a no extraluminal contrast identified  Long Miller's pouch      APPENDIX:  No findings to suggest appendicitis      ABDOMINOPELVIC CAVITY:  Interval placement of a drainage catheter within the gallbladder and a fluid collection along the hepatic resection site with collapse of both gallbladder and the fluid collection  A loculated collection along the splenic recess   of the lesser sac (2, 22) measures 6 8 x 4 1 cm, previously 0 9 x 4 2 cm when measured in a similar fashion      Rim-enhancing collections in the left upper quadrant adjacent to the kidney which contain fluid and a small amount of gas is similar in size  There are no new or enlarging collections      VESSELS:  Unremarkable for patient's age      PELVIS     REPRODUCTIVE ORGANS:  Unremarkable for patient's age      URINARY BLADDER:  Unremarkable      ABDOMINAL WALL/INGUINAL REGIONS:  Moderate body wall edema      OSSEOUS STRUCTURES:  No destructive osseous lesions      IMPRESSION:     Status post cholecystostomy tube placement with decompression of the gallbladder  In addition, a catheter placed adjacent to the cut edge of the liver is increased position with near complete resolution of fluid collection      Loculated collection along the splenic recess of the lesser sac is unchanged in size    Additional perisplenic collection in the hilum and along the inferior pole are unchanged      Collection of fluid and gas in the left upper quadrant adjacent to left kidney also not significantly changed  No extravasated oral contrast            Workstation performed: [id number]     CT abdomen pelvis w contrast  Status: Final result     PACS Images     Show images for CT abdomen pelvis w contrast  Study Result    Narrative & Impression   CT ABDOMEN AND PELVIS WITH IV CONTRAST     INDICATION:   Abdominal abscess/infection suspected  elevated WBC, known abscesses      COMPARISON:  Compared with 12/17/2022     TECHNIQUE:  CT examination of the abdomen and pelvis was performed  Axial, sagittal, and coronal 2D reformatted images were created from the source data and submitted for interpretation      Radiation dose length product (DLP) for this visit:  819 29 mGy-cm   This examination, like all CT scans performed in the [facility], was performed utilizing techniques to minimize radiation dose exposure, including the use of iterative   reconstruction and automated exposure control      IV Contrast:  90 mL of iohexol (OMNIPAQUE)  Enteric Contrast:  Enteric contrast was not administered      FINDINGS:     ABDOMEN     LOWER CHEST:  Trace right effusion  Moderate to large left effusion with adjacent atelectasis      LIVER/BILIARY TREE:  Previous partial left hepatic lobe resection with drainage catheter in the surgical bed demonstrates worsening fluid collection now measuring 4 2 x 3 2 cm  Diffuse tumor infiltration involving the right hepatic lobe predominantly   appears worsened  No biliary ductal dilatation  Portal vein and branches are patent      GALLBLADDER:  Percutaneous cholecystostomy tube looped in the gallbladder  Subhepatic fluid collection measuring 3 cm medial to the gallbladder is unchanged      SPLEEN:  Spleen measures 15 8 cm    Endometrial drainage catheter in the subhepatic collection with resolution of the collection with small inferior thick-walled fluid pockets measuring 3 7 cm and 2 4 cm are smaller      PANCREAS:  Unremarkable      ADRENAL GLANDS:  Unremarkable      KIDNEYS/URETERS:  2 cm right renal cyst  No hydronephrosis      STOMACH AND BOWEL:  Subtotal colectomy with sutures in the proximal sigmoid colon      APPENDIX:  No findings to suggest appendicitis      ABDOMINOPELVIC CAVITY:  No ascites  No pneumoperitoneum  No lymphadenopathy      VESSELS:  Unremarkable for patient's age      PELVIS     REPRODUCTIVE ORGANS:  Unremarkable for patient's age      URINARY BLADDER:  Unremarkable      ABDOMINAL WALL/INGUINAL REGIONS:  Left posterior thigh intramuscular lipoma      OSSEOUS STRUCTURES:  No acute fracture or destructive osseous lesion      IMPRESSION:     1  Moderate to large left effusion with adjacent atelectasis      2  Interval worsening left hepatic postsurgical site fluid collection now measuring 4 2 x 3 2 cm  Correlate with output      3  Resolution of subtle splenic collection with catheter in place  Bilobed thick walled fluid collection is smaller inferior to this        4  Percutaneous cholecystostomy tube in place with persistent surrounding inflammatory changes  Unchanged subhepatic collection      5  Tumor involvement involving the right hepatic lobe appears slightly worsened      Workstation performed: [id number]   IR IN-Patient Thoracentesis  Status: Final result     Physicians    Panel Physicians Referring Physician Case Authorizing Physician   MD Shiva Quesada CRNP      PACS Images    [facility] images for IR IN-Patient Thoracentesis    Study Result    Narrative & Impression   Ultrasound-guided thoracentesis     Clinical History: Shortness of breath      Technique: The patient was brought to the interventional radiology area and placed in the sitting position on the side of a stretcher  The left chest was then examined with ultrasound and the skin was marked      The skin was then prepped, and draped in usual sterile fashion  Lidocaine was administered to the skin and a small skin incision was made  Under direct ultrasound guidance, a 5 [employer] multisidehole catheter was advanced into the pleural space  300   cc of clear pleural fluid was aspirated        The patient tolerated the procedure well and suffered no complications      IMPRESSION:  Impression:  1  Successful ultrasound-guided left thoracentesis yielding 300 cc of clear pleural fluid                 Workstation performed: [id number]         *** minutes was spent face to face with {Patient and family:00989} with greater than 50% of the time spent in counseling or coordination of care including discussions of {COUNSELING REASONS FOR TIME BASED DOCUMENTATION:03445}  All of the patient's or agent's questions were answered during this discussion      This note was not shared with the patient due to {Reason why note was not shared:[id number]}

## 2023-01-30 NOTE — PROGRESS NOTES
Outpatient Virtual Regular Visit - Follow-up with Palliative and [address] 61 y o  male [id number]    Intake:    Reason for virtual visit is patient request this morning for feeling weak  After connecting through [employer], the patient was identified by name and date of birth  Rere Sommer or their agent was informed that this was a telemedicine visit and that the visit is being conducted through the [address] Now platform  He agrees to proceed     My office door was closed  No one else was in the room  They acknowledged consent and understanding of privacy and security of the video platform  The patient or agent has agreed to participate and understands they can discontinue the visit at any time  Assessment & Plan  Problem List Items Addressed This Visit        Digestive    Colon cancer metastasized to liver [facility])    Malignant neoplasm of transverse colon ([address] ) - Primary   Other Visit Diagnoses     Cancer related pain        Nausea              Medications adjusted this encounter:  Requested Prescriptions      No prescriptions requested or ordered in this encounter     No orders of the defined types were placed in this encounter  There are no discontinued medications  Juarez Richard was seen today for symptoms and planning cares related to above illnesses  I have reviewed the patient's controlled substance dispensing history in the Prescription Drug Monitoring Program in compliance with the [facility] regulations before prescribing any controlled substances  They are invited to continue to follow with us  If there are questions or concerns, please contact us through our clinic/answering service 24 hours a day, seven days a week      GAURANG Henderson  [facility]  [phone]        Visit Information    Accompanied By: Family member    Source of History: Self, Spouse    History Limitations: None      Chief Complaint  No chief complaint on file  Recent hospital admission to [facility] 12/14/2022 to 01/17/2023  Copied from discharge summary:  Discharge Summary - Bia Montes De Oca 61 y o  male MRN: [id number]  Unit/Bed#: -02 Encounter: [id number]     Admission Date: 12/14/2022      Discharge Date: 01/17/2023     Etiologic/Rehabilitation Diagnosis: Impairment of mobility, safety and Activities of Daily Living (ADLs) due to Neurologic Conditions:  03 8  Neuromuscular Disorders   Etiologic Dx: Critical Illness Myopathy  Date of Onset: 11/7/2022     Date of surgery:      11/7/2022 Exploratory Laparotomy, resection of hepatic segment 3, resection of transverse colon with reversal of loop colostomy  11/16/2022 Right hemicolectomy, left in discontinuity and temporary abdominal closure device placed  11/17/2022 Re-exploration, partial descending colectomy, primary colocolonic anastomosis created with diverting loop ileostomy, midline wound VAC placement  12/8/2022 IR Percutaneous Cholecystostomy Tube Placement, Hepatectomy Abscess Drain Placement  12/12/2022 IR Tube Check     HPI: Enedina Lama is a 61 y o  male with a medical history of hypertension, hyperlipidemia, ventral hernia, GERD that presented to [address] [place] [address] on 11/7 for an elective surgical procedure due to metastatic colon adenocarcinoma by Dr Dheeraj Paul  Patient presented to hem/onc on 2/22 when CT of abdomen revealed transverse colonic apple core lesion and innumerable hepatic metastases  MRI revealed multiple hepatic metastasis with smaller lesions in left lobe with larger lesions on the right lobe  On 11/7, patient underwent exploratory laparotomy, segment 3 liver resection, ablation, intraoperative ultrasound of liver  This was complicated by a left upper quadrant hematoma, imaging showed suspected leak from transverse colon anastomosis   On 11/16 went to OR revealed left upper quadrant infected hematoma, defect in the transverse colon anastomosis with extravasation of succus  Massively dilated cecum requiring resection  He had a right hemicolectomy, left in discontinuity and temporary abdominal closure device was placed  On 11/17, he underwent re-exploration, partial descending colectomy, primary colonic anastomosis created with diverting loop ileostomy and midline wound VAC placement  He completed a prolonged course of IV antibiotics for ESBL bacteremia due to intra-abdominal abscess  On 12/3, patient was discharged to SNF for rehab  Patient was transferred back to hospital on 12/4 due to abdominal pain  He is unable to return to SNF due to concerns for pain management and management of acute medical needs  Per Surgery, patient is to continue with wet-to-dry dressings to midline abdominal incision, and packing to old stoma site  Patient has been observed off of antibiotics, with close monitoring of leukocytosis  On 12/6, patient was with noted increased creatinine level, and was administered 1L IV fluid bolus, with noted improvement  He then developed tachycardia, as well as increased abdominal pain, requiring additional IV fluid bolus  Abdominal incision was with noted yellow drainage  CT chest/abdomen/pelvis showed abdominal abscess and distended gallbladder  ID was consulted, and patient was continued on IV antibiotics (plan for 7 day course through 12/15)  Patient was taken to IR and underwent percutaneous cholecystostomy tube insertion and hepatectomy abscess drainage  Nephrology was consulted re: ALEXY, and initiated patient on Sodium Bicarb gtt as well as IV Albumin to assist with intravascular volume shifts, which as since been discontinued  On 12/14, patient with complaints of midline/left chestwall pain, right below IR abscess drain  Lasted for a few seconds and then resolved - suspected related to drain placement, however cardiac workup completed   Trop level was 57 and CXR showed progressive bilateral opacities suspicious for CHF and small left pleural effusion  Cardiology was consulted, with chest pain appearing musculoskeletal, and no further cardiac workup is warranted  Additionally, no diuresis or further intervention suggested for CXR findings  The patient was evaluated by the Rehabilitation team and deemed an appropriate candidate for comprehensive inpatient rehabilitation and admitted to the [facility] on 12/14/2022  3:18 PM     Procedures Performed During ARC Admission: None     [address] Course: Patient participated in a comprehensive interdisciplinary inpatient rehabilitation program which included involvment of MD, therapies (PT, OT, and/or SLP), RN, CM, SW, dietary, and psychology services  He was able to be advanced to a modified independent level of assist and considered safe for discharge home  Patient requested virtual visit today due to increased weakness and fatigue  He     Past medical, surgical, social, and family histories are reviewed and pertinent updates are made  History of Present Illness      Sam Leslie is a 61 y o  male who presents in follow up of symptoms related to malignant neoplasm of transverse colon, with metastasis to liver  Pertinent issues include: symptom management, pain, neoplasm related, nausea, fatigue  Patient called the office this morning, requesting a virtual visit due to increased weakness and fatigue  He states he has home PT which started on 01/27/2023 and will be 2 times per week due to insurance  He states he had done better at [facility]  Appetite is decreased due to food not tasting the same, he was tolerating [employer] nutritional supplements prior to cancer related surgery  Denies nausea or vomiting  He reports he ha good days and bad days, a good day is ambulating with his walker and sitting at the table, his goal is to ambulate with less assistance and to get out of the house    He complains of abdominal pain and back pain, he is taking Oxycodone 10 mg at bedtime and Tylenol 2-3 times per day  Oxycodone is effective in the evening, sleeping well, Tylenol mild effect during the day  Colostomy active  Past Medical History:   Diagnosis Date   • Abdominal pain 03/12/2022   • Acute renal failure ([place] [address] )     [id number] resolved   • Cancer ([place] [address] )    • Diabetes mellitus ([place] [address] )    • Enteritis 08/23/2016   • Gastroparesis due to DM ([place] [address] ) 08/23/2016   • GERD (gastroesophageal reflux disease)    • Hernia, ventral 08/04/2016   • Hyperlipidemia    • Hypertension    • Morbid obesity ([place] [address] ) 04/17/2018   • Postoperative visit 03/02/2022   • SIRS (systemic inflammatory response syndrome) ([place] [address] ) 03/12/2022   • Snoring    • Stage 3a chronic kidney disease ([place] [address] ) 02/19/2022     Past Surgical History:   Procedure Laterality Date   • COLONOSCOPY     • COLOSTOMY N/A 02/20/2022    Procedure: COLOSTOMY LOOP, diverting;  Surgeon: Haresh Bruno MD;  Location: [facility];  Service: General   • ESOPHAGOGASTRODUODENOSCOPY N/A 08/24/2016    Procedure: ESOPHAGOGASTRODUODENOSCOPY (EGD); Surgeon: Amos Andrea MD;  Location: [facility];   Service:    • EXPLORATORY LAPAROTOMY W/ BOWEL RESECTION N/A 11/16/2022    Procedure: LAPAROTOMY EXPLORATORY W/ BOWEL RESECTION- VAC PLACEMENT;  Surgeon: Addison Wagoner MD;  Location: [facility];  Service: Surgical Oncology   • EXPLORATORY LAPAROTOMY W/ BOWEL RESECTION N/A 11/17/2022    Procedure: LAPAROTOMY EXPLORATORY W/ BOWEL RESECTION;  Surgeon: Addison Wagoner MD;  [facility];  Service: Surgical Oncology   • ILEOSTOMY N/A 11/17/2022    Procedure: Helen Neth;  Surgeon: Addison Wagoner MD;  [facility];  Service: Surgical Oncology   • ILEOSTOMY CLOSURE N/A 11/7/2022    Procedure: REVERSAL COLOSTOMY;  Surgeon: Theodore Navarrete MD;  [facility];  Service: Surgical Oncology   • IR CHOLECYSTOSTOMY TUBE CHECK/CHANGE/REPOSITION/REINSERTION/UPSIZE  12/12/2022   • IR CHOLECYSTOSTOMY TUBE PLACEMENT  12/8/2022   • IR DRAINAGE TUBE CHECK AND/OR REMOVAL  1/3/2023   • IR DRAINAGE TUBE CHECK/CHANGE/REPOSITION/REINSERTION/UPSIZE  1/12/2023   • IR DRAINAGE TUBE PLACEMENT  12/8/2022   • IR DRAINAGE TUBE PLACEMENT  12/20/2022   • IR PORT PLACEMENT  03/10/2022   • IR THORACENTESIS  1/12/2023   • KIDNEY STONE SURGERY     • LIVER BIOPSY LAPAROSCOPIC N/A 02/20/2022    Procedure: DIAGNOSTIC LAPAROSCOPIC LIVER BIOPSY, DIVERTING LOOP COLOSTOMY ;  Surgeon: Namita Lara MD;  [facility]: [facility];  Service: General   • LIVER LOBECTOMY N/A 11/7/2022    Procedure: SEGMENT 3 LIVER RESECTION, ABLATION, INTRAOPERATIVE U/S OF LIVER, PERITONEAL BIOPSY;  Surgeon: Suresh West MD;  Location: [facility];  Service: Surgical Oncology   • RIGHT COLON RESECTION N/A 11/7/2022    Procedure: EX LAP, TRANVERSE COLON RESECTION;  Surgeon: Suresh West MD;  [facility];  Service: Surgical Oncology   • TONSILLECTOMY     • UMBILICAL HERNIA REPAIR LAPAROSCOPIC N/A 04/26/2019    Procedure: LAPAROSCOPIC UMBILICAL HERNIA REPAIR;  Surgeon: Namita Lara MD;  [facility];  Service: General   • VAC DRESSING APPLICATION N/A 49/83/0764    Procedure: APPLICATION VAC DRESSING ABDOMEN/TRUNK;  Surgeon: Nadine Bains MD;  [facility];  Service: Surgical Oncology     Current Outpatient Medications   Medication Sig Dispense Refill   • acetaminophen (TYLENOL) 325 mg tablet Take 1 tablet (325 mg total) by mouth every 4 (four) hours as needed for mild pain  0   • amLODIPine (NORVASC) 5 mg tablet Take 1 tablet (5 mg total) by mouth 2 (two) times a day 60 tablet 0   • aspirin 81 MG tablet Take 81 mg by mouth daily       • atorvastatin (LIPITOR) 40 mg tablet Take 1 tablet (40 mg total) by mouth daily 30 tablet 0   • Cholecalciferol (VITAMIN D3 PO) Take 400 Units by mouth daily     • collagenase (SANTYL) ointment Apply topically daily To the abdominal incision area 90 g 0   • Continuous Blood Gluc Sensor (FreeStyle Parrish 14 Day Sensor) MISC Use 1 each every 14 (fourteen) days 2 each 6   • DULoxetine (CYMBALTA) 30 mg delayed release capsule Take 1 capsule (30 mg total) by mouth daily Do not start before January 17, 2023  30 capsule 0   • Insulin Glargine Solostar (Lantus SoloStar) 100 UNIT/ML SOPN Inject 0 1 mL (10 Units total) under the skin daily at bedtime 15 mL 0   • insulin lispro (HumaLOG KwikPen) 100 units/mL injection pen Inject 4 Units under the skin 3 (three) times a day with meals 15 mL 0   • Insulin Pen Needle (B-D UF III MINI PEN NEEDLES) 31G X 5 MM MISC Inject under the skin daily 100 each 3   • Insulin Pen Needle (BD Pen Needle Jessica 2nd Gen) 32G X 4 MM MISC For use with insulin pen  Pharmacy may dispense brand covered by insurance  100 each 0   • Insulin Pen Needle (BD Pen Needle Jessica 2nd Gen) 32G X 4 MM MISC For use with insulin pen  Pharmacy may dispense brand covered by insurance   100 each 0   • methocarbamol (ROBAXIN) 500 mg tablet Take 1 tablet (500 mg total) by mouth 2 (two) times a day 20 tablet 0   • Multiple Vitamins-Minerals (multivitamin with minerals) tablet Take 1 tablet by mouth daily     • Needle, Disp, (HYPODERMIC NEEDLE) 23G X 1-1/2" MISC by Does not apply route once a week 10 each 6   • ondansetron (ZOFRAN-ODT) 4 mg disintegrating tablet Take 1 tablet (4 mg total) by mouth every 6 (six) hours as needed (nausea,vomiting) 20 tablet 0   • Ostomy Supplies MISC Use in the morning 100 each 3   • oxyCODONE (ROXICODONE) 10 MG TABS Take 1 tablet (10 mg total) by mouth 2 (two) times a day as needed for severe pain Max Daily Amount: 20 mg 40 tablet 0   • pantoprazole (PROTONIX) 40 mg tablet Take 1 tablet (40 mg total) by mouth 2 (two) times a day 60 tablet 0   • simethicone (MYLICON) 80 mg chewable tablet Chew 1 tablet (80 mg total) 4 (four) times a day (with meals and at bedtime) 120 tablet 0   • sodium chloride, PF, 0 9 % 10 mL by Intracatheter route daily 300 mL 0   • tamsulosin (FLOMAX) 0 4 mg Take 1 capsule (0 4 mg total) by mouth daily with dinner  0     No current facility-administered medications for this visit  Allergies   Allergen Reactions   • Shellfish-Derived Products - Food Allergy Anaphylaxis   • Erythromycin GI Intolerance       Review of Systems   Constitutional: Positive for appetite change and fatigue  Respiratory: Negative for shortness of breath  Cardiovascular: Negative for chest pain  Gastrointestinal: Positive for abdominal pain  Negative for constipation, diarrhea, nausea and vomiting  Musculoskeletal: Positive for back pain  Neurological: Positive for weakness  Video Exam  There were no vitals filed for this visit  Physical Exam  Constitutional:       Comments: Chronically ill appearing, appears weak and fatigued   Pulmonary:      Effort: Pulmonary effort is normal    Neurological:      Mental Status: He is oriented to person, place, and time  Radiology and Laboratory:  CT abdomen pelvis w contrast  Status: Final result     PACS Images     Show images for CT abdomen pelvis w contrast  Study Result    Narrative & Impression   CT ABDOMEN AND PELVIS WITH IV CONTRAST     INDICATION:   Sepsis  sepsis'      COMPARISON:  Comparison is made to prior studies, the most recent is dated December 8, 2022            TECHNIQUE:  CT examination of the abdomen and pelvis was performed  Axial, sagittal, and coronal 2D reformatted images were created from the source data and submitted for interpretation      Radiation dose length product (DLP) for this visit:  1807 57 mGy-cm     This examination, like all CT scans performed in the [facility], was performed utilizing techniques to minimize radiation dose exposure, including the use of   iterative reconstruction and automated exposure control      IV Contrast:  100 mL of iohexol (OMNIPAQUE)  Enteric Contrast:  Enteric contrast was not administered      FINDINGS:     ABDOMEN     LOWER CHEST:  Bilateral pleural effusions, slightly larger on the right when compared to prior study, with underlying compressive atelectasis      LIVER/BILIARY TREE:  Patient is status post segment 3 segmentectomy with a drainage catheter at the resected liver edge and marked decrease in the size of fluid collection      When compared to the prior study, the overall tumor burden is probably unchanged although more obvious on the current study due to difference in timing relative to the contrast bolus      GALLBLADDER:  Interval placement of a cholecystostomy catheter which is positioned in the gallbladder lumen  The gallbladder is decompressed      SPLEEN:  Perisplenic collections are unchanged      PANCREAS:  Unremarkable      ADRENAL GLANDS:  Unremarkable      KIDNEYS/URETERS:  No hydronephrosis      STOMACH AND BOWEL:  Postsurgical changes with left upper quadrant diverting ileostomy and what appears to be a no extraluminal contrast identified  Long Miller's pouch      APPENDIX:  No findings to suggest appendicitis      ABDOMINOPELVIC CAVITY:  Interval placement of a drainage catheter within the gallbladder and a fluid collection along the hepatic resection site with collapse of both gallbladder and the fluid collection  A loculated collection along the splenic recess   of the lesser sac (2, 22) measures 6 8 x 4 1 cm, previously 0 9 x 4 2 cm when measured in a similar fashion      Rim-enhancing collections in the left upper quadrant adjacent to the kidney which contain fluid and a small amount of gas is similar in size  There are no new or enlarging collections      VESSELS:  Unremarkable for patient's age      PELVIS     REPRODUCTIVE ORGANS:  Unremarkable for patient's age      URINARY BLADDER:  Unremarkable      ABDOMINAL WALL/INGUINAL REGIONS:  Moderate body wall edema      OSSEOUS STRUCTURES:  No destructive osseous lesions      IMPRESSION:     Status post cholecystostomy tube placement with decompression of the gallbladder    In addition, a catheter placed adjacent to the cut edge of the liver is increased position with near complete resolution of fluid collection      Loculated collection along the splenic recess of the lesser sac is unchanged in size  Additional perisplenic collection in the hilum and along the inferior pole are unchanged      Collection of fluid and gas in the left upper quadrant adjacent to left kidney also not significantly changed  No extravasated oral contrast            Workstation performed: [id number]     CT abdomen pelvis w contrast  Status: Final result     PACS Images     Show images for CT abdomen pelvis w contrast  Study Result    Narrative & Impression   CT ABDOMEN AND PELVIS WITH IV CONTRAST     INDICATION:   Abdominal abscess/infection suspected  elevated WBC, known abscesses      COMPARISON:  Compared with 12/17/2022     TECHNIQUE:  CT examination of the abdomen and pelvis was performed  Axial, sagittal, and coronal 2D reformatted images were created from the source data and submitted for interpretation      Radiation dose length product (DLP) for this visit:  819 29 mGy-cm   This examination, like all CT scans performed in the [facility], was performed utilizing techniques to minimize radiation dose exposure, including the use of iterative   reconstruction and automated exposure control      IV Contrast:  90 mL of iohexol (OMNIPAQUE)  Enteric Contrast:  Enteric contrast was not administered      FINDINGS:     ABDOMEN     LOWER CHEST:  Trace right effusion  Moderate to large left effusion with adjacent atelectasis      LIVER/BILIARY TREE:  Previous partial left hepatic lobe resection with drainage catheter in the surgical bed demonstrates worsening fluid collection now measuring 4 2 x 3 2 cm  Diffuse tumor infiltration involving the right hepatic lobe predominantly   appears worsened  No biliary ductal dilatation    Portal vein and branches are patent      GALLBLADDER:  Percutaneous cholecystostomy tube looped in the gallbladder  Subhepatic fluid collection measuring 3 cm medial to the gallbladder is unchanged      SPLEEN:  Spleen measures 15 8 cm  Endometrial drainage catheter in the subhepatic collection with resolution of the collection with small inferior thick-walled fluid pockets measuring 3 7 cm and 2 4 cm are smaller      PANCREAS:  Unremarkable      ADRENAL GLANDS:  Unremarkable      KIDNEYS/URETERS:  2 cm right renal cyst  No hydronephrosis      STOMACH AND BOWEL:  Subtotal colectomy with sutures in the proximal sigmoid colon      APPENDIX:  No findings to suggest appendicitis      ABDOMINOPELVIC CAVITY:  No ascites  No pneumoperitoneum  No lymphadenopathy      VESSELS:  Unremarkable for patient's age      PELVIS     REPRODUCTIVE ORGANS:  Unremarkable for patient's age      URINARY BLADDER:  Unremarkable      ABDOMINAL WALL/INGUINAL REGIONS:  Left posterior thigh intramuscular lipoma      OSSEOUS STRUCTURES:  No acute fracture or destructive osseous lesion      IMPRESSION:     1  Moderate to large left effusion with adjacent atelectasis      2  Interval worsening left hepatic postsurgical site fluid collection now measuring 4 2 x 3 2 cm  Correlate with output      3  Resolution of subtle splenic collection with catheter in place  Bilobed thick walled fluid collection is smaller inferior to this        4  Percutaneous cholecystostomy tube in place with persistent surrounding inflammatory changes  Unchanged subhepatic collection      5   Tumor involvement involving the right hepatic lobe appears slightly worsened      Workstation performed: [id number]   IR IN-Patient Thoracentesis  Status: Final result     Physicians    Panel Physicians Referring Physician Case Authorizing Physician   MD Leobardo Rico CRNP      PACS Images    [facility] images for IR IN-Patient Thoracentesis    Study Result    Narrative & Impression   Ultrasound-guided thoracentesis     Clinical History: Shortness of breath      Technique: The patient was brought to the interventional radiology area and placed in the sitting position on the side of a stretcher  The left chest was then examined with ultrasound and the skin was marked      The skin was then prepped, and draped in usual sterile fashion  Lidocaine was administered to the skin and a small skin incision was made  Under direct ultrasound guidance, a 5 [employer] Yueh multisidehole catheter was advanced into the pleural space  300   cc of clear pleural fluid was aspirated        The patient tolerated the procedure well and suffered no complications      IMPRESSION:  Impression:  1  Successful ultrasound-guided left thoracentesis yielding 300 cc of clear pleural fluid                 Workstation performed: [id number]             I spent 27+ minutes was spent during this virtual visit by Robert, face to face with Bob Mcginnis and his spouse with greater than 50% of the time spent in counseling or coordination of care including discussions of supportive listening and symptom management   All of the patient's or agent's questions were answered during this discussion  Encounter provider GAURANG Smith, located at:  [address] [facility]  [address]  [phone]    Recent Visits  No visits were found meeting these conditions  Showing recent visits within past 7 days and meeting all other requirements  Future Appointments  No visits were found meeting these conditions  Showing future appointments within next 150 days and meeting all other requirements       VIRTUAL VISIT DISCLAIMER    Elie Alberts or their agent has consented to an online visit or consultation    They understands that the online visit is based solely on information provided by the patient or agent, and that, in the absence of a face-to-face physical evaluation by the physician, the diagnosis they receive is both limited and provisional in terms of accuracy and completeness  This is not intended to replace a full medical face-to-face evaluation by the physician  Disha Sommer or their agent understands and accepts these terms  The patient or their agent was informed this is a billable service

## 2023-01-30 NOTE — PROGRESS NOTES
Palliative Supportive Care  met with patient/family to continue to provide emotional support and guidance  Updated biopsychosocial information relevant to support: Patient reported that he has been very tired, sleeping well  Reports when he awake, he feels rested for about 2 hours  Patient reported pain in abdomen and back where the drain is  Patient reported that he does take pain medication before bed because his pain is at a 8 or 9 and is able to get some relief  Patient reported that he takes 3 tylenol 1-2 x a day to help with the pain  Discussion with CRNP and patient regarding taking his oxycodone during the day if needed  Patient reported no nausea/vomiting  Reported that he recently started home PT but that his insurance is only paying for 2 x a week and patient feels like he lost some of the progress he made while at the [facility]  Patient reported that his appetite is improving, ensure/boost drinks are too heavy for him to tolerate  Patient reported that on a good day, he is able to walk with a walker, transfer to seat and then sit at the table  Patient reported that yesterday he was able to watch a move and watch the game  Patient would like to be able to walk with less assistance and be able to get out of the house easier for appointments  Patient reported that they didn't expect him to loss his strength so quickly  Identified areas of need include: ongoing support  Resources provided: none  Areas that need future follow-up include: emotional support    I have spent 25 minutes with Patient and family today in which greater than 50% of this time was spent in counseling/coordination of care regarding Patient and family education      Palliative  will follow-up as requested by patient, family, and primary team   Please contact with any specific requests

## 2023-01-31 ENCOUNTER — TELEPHONE (OUTPATIENT)
Dept: INTERNAL MEDICINE CLINIC | Facility: CLINIC | Age: 58
End: 2023-01-31

## 2023-01-31 ENCOUNTER — HOSPITAL ENCOUNTER (OUTPATIENT)
Dept: NON INVASIVE DIAGNOSTICS | Facility: HOSPITAL | Age: 58
Discharge: HOME | End: 2023-01-31
Attending: STUDENT IN AN ORGANIZED HEALTH CARE EDUCATION/TRAINING PROGRAM

## 2023-01-31 ENCOUNTER — HOSPITAL ENCOUNTER (OUTPATIENT)
Dept: NON INVASIVE DIAGNOSTICS | Facility: HOSPITAL | Age: 58
Discharge: HOME | End: 2023-01-31
Attending: RADIOLOGY

## 2023-01-31 DIAGNOSIS — K81.9 CHOLECYSTITIS: ICD-10-CM

## 2023-01-31 DIAGNOSIS — C18.9 COLON CANCER METASTASIZED TO LIVER (HCC): Primary | ICD-10-CM

## 2023-01-31 DIAGNOSIS — C78.7 COLON CANCER METASTASIZED TO LIVER (HCC): Primary | ICD-10-CM

## 2023-01-31 DIAGNOSIS — R18.8 INTRA-ABDOMINAL FLUID COLLECTION: ICD-10-CM

## 2023-01-31 RX ADMIN — IOHEXOL 18 ML: 350 INJECTION, SOLUTION INTRAVENOUS at 16:31

## 2023-01-31 NOTE — SEDATION DOCUMENTATION
WIFE BROUGHT TO PROCEDURE AREA  DISCUSSED FINDINGS  NO QUESTIONS OR CONCERNS ABOUT POST PROCEDURE CARE

## 2023-01-31 NOTE — BRIEF OP NOTE (RAD/CATH)
INTERVENTIONAL RADIOLOGY PROCEDURE NOTE    Date: 1/31/2023    Procedure:   Procedure Summary     Date: 01/31/23 Room / Location: [address] [place] [address] Cardiac Cath Lab    Anesthesia Start:  Anesthesia Stop:     Procedure: IR DRAINAGE TUBE CHECK/CHANGE/REPOSITION/REINSERTION/UPSIZE Diagnosis:       Intra-abdominal fluid collection      (2 week drain f/u)    Scheduled Providers: Doron Woodard MD Responsible Provider:     Anesthesia Type: Not recorded ASA Status: Not recorded          Preoperative diagnosis:   1  Intra-abdominal fluid collection         Postoperative diagnosis: Same  Surgeon: Doron Woodard MD     Assistant: None  No qualified resident was available  Blood loss: Minimal    Specimens: None    Findings:  Cholecystostomy tube check demonstrated appropriate positioning of the cholecystostomy tube; therefore, this tube was left in place  Midline abdominal drainage catheter check demonstrated significant residual abscess cavity  Patient and his wife also reported significant output from this drain  Therefore, this drain was left in place  Checks of the 2 left lateral drainage catheters demonstrated no significant residual abscess cavities; therefore these tube drains were cut and removed  Plan:  Abscess drain check in 2 weeks  Cholecystostomy tube maintenance change in 2 weeks  Complications: None immediate      Anesthesia: none

## 2023-01-31 NOTE — TELEPHONE ENCOUNTER
Patient has a TCM  appointment  on 2/2/2023, but would like to make it an virtual ,because he's weak & housebound

## 2023-02-01 ENCOUNTER — TELEPHONE (OUTPATIENT)
Dept: HEMATOLOGY ONCOLOGY | Facility: CLINIC | Age: 58
End: 2023-02-01

## 2023-02-01 NOTE — TELEPHONE ENCOUNTER
Call Transfer   Reason for patient call? Patients brother Daiana Godinez calling to speak with Terry Weiss about patients care    Daiana Godinez is not on [employer]  No information was disclosed to Daiana Godinez  If someone other than patient is calling, are they listed on the communication consent? No   Patient's primary oncologist? Dr Dheeraj Paul    Person/Department that the call was transferred to? Time that call was transferred? Terry Doing  @ 2:34PM   Informed patient that the message will be forwarded to the team and someone will get back to them as soon as possible  Did you relay this information to the patient?  Yes

## 2023-02-02 ENCOUNTER — TELEMEDICINE (OUTPATIENT)
Dept: INTERNAL MEDICINE CLINIC | Facility: CLINIC | Age: 58
End: 2023-02-02

## 2023-02-02 ENCOUNTER — TELEMEDICINE (OUTPATIENT)
Dept: SURGICAL ONCOLOGY | Facility: CLINIC | Age: 58
End: 2023-02-02

## 2023-02-02 ENCOUNTER — HOSPITAL ENCOUNTER (EMERGENCY)
Facility: HOSPITAL | Age: 58
Discharge: HOME | End: 2023-02-02
Attending: EMERGENCY MEDICINE

## 2023-02-02 VITALS
RESPIRATION RATE: 17 BRPM | DIASTOLIC BLOOD PRESSURE: 62 MMHG | OXYGEN SATURATION: 98 % | HEART RATE: 110 BPM | SYSTOLIC BLOOD PRESSURE: 100 MMHG | TEMPERATURE: 97.6 F

## 2023-02-02 DIAGNOSIS — E78.2 MIXED HYPERLIPIDEMIA: ICD-10-CM

## 2023-02-02 DIAGNOSIS — E11.9 TYPE 2 DIABETES MELLITUS WITHOUT COMPLICATION, WITH LONG-TERM CURRENT USE OF INSULIN (HCC): Primary | ICD-10-CM

## 2023-02-02 DIAGNOSIS — S30.0XXA CONTUSION OF COCCYX, INITIAL ENCOUNTER: Primary | ICD-10-CM

## 2023-02-02 DIAGNOSIS — C18.9 COLON CANCER METASTASIZED TO LIVER (HCC): ICD-10-CM

## 2023-02-02 DIAGNOSIS — Z79.4 TYPE 2 DIABETES MELLITUS WITHOUT COMPLICATION, WITH LONG-TERM CURRENT USE OF INSULIN (HCC): Primary | ICD-10-CM

## 2023-02-02 DIAGNOSIS — N18.31 ACUTE RENAL FAILURE SUPERIMPOSED ON STAGE 3A CHRONIC KIDNEY DISEASE, UNSPECIFIED ACUTE RENAL FAILURE TYPE (HCC): ICD-10-CM

## 2023-02-02 DIAGNOSIS — I10 BENIGN ESSENTIAL HYPERTENSION: ICD-10-CM

## 2023-02-02 DIAGNOSIS — C78.7 COLON CANCER METASTASIZED TO LIVER (HCC): ICD-10-CM

## 2023-02-02 DIAGNOSIS — C18.4 MALIGNANT NEOPLASM OF TRANSVERSE COLON (HCC): Primary | ICD-10-CM

## 2023-02-02 DIAGNOSIS — N39.0 URINARY TRACT INFECTION WITHOUT HEMATURIA, SITE UNSPECIFIED: Primary | ICD-10-CM

## 2023-02-02 DIAGNOSIS — T81.31XD DEHISCENCE OF OPERATIVE WOUND, SUBSEQUENT ENCOUNTER: ICD-10-CM

## 2023-02-02 DIAGNOSIS — N17.9 ACUTE RENAL FAILURE SUPERIMPOSED ON STAGE 3A CHRONIC KIDNEY DISEASE, UNSPECIFIED ACUTE RENAL FAILURE TYPE (HCC): ICD-10-CM

## 2023-02-02 PROBLEM — J81.0 FLASH PULMONARY EDEMA (HCC): Status: RESOLVED | Noted: 2022-11-12 | Resolved: 2023-02-02

## 2023-02-02 PROBLEM — R78.81 BACTEREMIA: Status: RESOLVED | Noted: 2022-11-12 | Resolved: 2023-02-02

## 2023-02-02 PROBLEM — J96.01 ACUTE RESPIRATORY FAILURE WITH HYPOXIA (HCC): Status: RESOLVED | Noted: 2022-11-12 | Resolved: 2023-02-02

## 2023-02-02 RX ORDER — AMPICILLIN 500 MG/1
500 CAPSULE ORAL 4 TIMES DAILY
Qty: 28 CAPSULE | Refills: 0 | Status: ON HOLD | OUTPATIENT
Start: 2023-02-02 | End: 2023-02-09

## 2023-02-02 RX ORDER — AMLODIPINE BESYLATE 2.5 MG/1
2.5 TABLET ORAL DAILY
Qty: 90 TABLET | Refills: 1 | Status: ON HOLD | OUTPATIENT
Start: 2023-02-02

## 2023-02-02 NOTE — PROGRESS NOTES
Virtual Brief Visit    Patient is located in the following state in which I hold an active license [place]      Assessment/Plan: Patient was seen as a virtual visit today because he fell when he was getting out of bed  He was taken to the emergency room  The patient states he is actually feeling better  His energy level is improving  He is sleeping better and his appetite is good  2 of his drains have been removed and he is not having any fevers or chills  He continues with local wound care, but states the wound does appear moist   I will plan on seeing him again in 2 weeks  He should also consider starting antibiotics for his urinary tract infection based on the urine that his visiting nurse obtained  Problem List Items Addressed This Visit        Digestive    Malignant neoplasm of transverse colon [facility]) - Primary    Colon cancer metastasized to liver [facility])       Recent Visits  No visits were found meeting these conditions  Showing recent visits within past 7 days and meeting all other requirements  Future Appointments  No visits were found meeting these conditions    Showing future appointments within next 150 days and meeting all other requirements         Visit Time    Visit Start Time: 11:40  Visit Stop Time: 11:48  Total Visit Duration: 8 minutes

## 2023-02-02 NOTE — ED PROVIDER NOTES
History  Chief Complaint   Patient presents with   • Fall     Pt BIBA d/t reports of a fall onto his bottom after losing his balance putting his shoes on and sliding off the bed onto his bottom  Pt reports pain to his buttocks and lower back  62-year-old male, presents with pain in tailbone area after falling out of bed and landed on his buttocks  Patient reports chronic weakness after hospitalization and surgeries for colon cancer  Patient states he was getting out of bed, lost his balance and landed directly on his buttocks earlier today  Denies any head injury or loss of consciousness  Patient states he was having pain in his tailbone area which has been improving  Denies any other injury  History provided by:  Patient   used: No    Fall      Prior to Admission Medications   Prescriptions Last Dose Informant Patient Reported? Taking? Cholecalciferol (VITAMIN D3 PO)   Yes No   Sig: Take 400 Units by mouth daily   Continuous Blood Gluc Sensor (FreeStyle Parrish 14 Day Sensor) MISC   No No   Sig: Use 1 each every 14 (fourteen) days   DULoxetine (CYMBALTA) 30 mg delayed release capsule   No No   Sig: Take 1 capsule (30 mg total) by mouth daily Do not start before January 17, 2023  Insulin Glargine Solostar (Lantus SoloStar) 100 UNIT/ML SOPN   No No   Sig: Inject 0 1 mL (10 Units total) under the skin daily at bedtime   Insulin Pen Needle (B-D UF III MINI PEN NEEDLES) 31G X 5 MM MISC   No No   Sig: Inject under the skin daily   Insulin Pen Needle (BD Pen Needle Jessica 2nd Gen) 32G X 4 MM MISC   No No   Sig: For use with insulin pen  Pharmacy may dispense brand covered by insurance  Insulin Pen Needle (BD Pen Needle Jessica 2nd Gen) 32G X 4 MM MISC   No No   Sig: For use with insulin pen  Pharmacy may dispense brand covered by insurance     Multiple Vitamins-Minerals (multivitamin with minerals) tablet   Yes No   Sig: Take 1 tablet by mouth daily   Needle, Disp, (HYPODERMIC NEEDLE) 23G X 1-1/2" MISC   No No   Sig: by Does not apply route once a week   Ostomy Supplies MISC   No No   Sig: Use in the morning   acetaminophen (TYLENOL) 325 mg tablet   No No   Sig: Take 1 tablet (325 mg total) by mouth every 4 (four) hours as needed for mild pain   amLODIPine (NORVASC) 5 mg tablet   No No   Sig: Take 1 tablet (5 mg total) by mouth 2 (two) times a day   aspirin 81 MG tablet   Yes No   Sig: Take 81 mg by mouth daily  atorvastatin (LIPITOR) 40 mg tablet   No No   Sig: Take 1 tablet (40 mg total) by mouth daily   collagenase (SANTYL) ointment   No No   Sig: Apply topically daily To the abdominal incision area   insulin lispro (HumaLOG KwikPen) 100 units/mL injection pen   No No   Sig: Inject 4 Units under the skin 3 (three) times a day with meals   methocarbamol (ROBAXIN) 500 mg tablet   No No   Sig: Take 1 tablet (500 mg total) by mouth 2 (two) times a day   naloxone (NARCAN) 4 mg/0 1 mL nasal spray   No No   Sig: Administer 1 spray into a nostril  If no response after 2-3 minutes, give another dose in the other nostril using a new spray     ondansetron (ZOFRAN-ODT) 4 mg disintegrating tablet   No No   Sig: Take 1 tablet (4 mg total) by mouth every 6 (six) hours as needed (nausea,vomiting)   oxyCODONE (ROXICODONE) 10 MG TABS   No No   Sig: Take 1 tablet (10 mg total) by mouth every 6 (six) hours as needed for severe pain Max Daily Amount: 40 mg   pantoprazole (PROTONIX) 40 mg tablet   No No   Sig: Take 1 tablet (40 mg total) by mouth 2 (two) times a day   simethicone (MYLICON) 80 mg chewable tablet   No No   Sig: Chew 1 tablet (80 mg total) 4 (four) times a day (with meals and at bedtime)   sodium chloride, PF, 0 9 %   No No   Sig: 10 mL by Intracatheter route daily   tamsulosin (FLOMAX) 0 4 mg   No No   Sig: Take 1 capsule (0 4 mg total) by mouth daily with dinner      Facility-Administered Medications: None       Past Medical History:   Diagnosis Date   • Abdominal pain 03/12/2022   • Acute renal failure [facility])     [id number] resolved   • Cancer ([place] [address] )    • Diabetes mellitus ([place] [address] )    • Enteritis 08/23/2016   • Gastroparesis due to DM ([place] [address] ) 08/23/2016   • GERD (gastroesophageal reflux disease)    • Hernia, ventral 08/04/2016   • Hyperlipidemia    • Hypertension    • Morbid obesity ([place] [address] ) 04/17/2018   • Postoperative visit 03/02/2022   • SIRS (systemic inflammatory response syndrome) ([place] [address] ) 03/12/2022   • Snoring    • Stage 3a chronic kidney disease ([place] [address] ) 02/19/2022       Past Surgical History:   Procedure Laterality Date   • COLONOSCOPY     • COLOSTOMY N/A 02/20/2022    Procedure: COLOSTOMY LOOP, diverting;  Surgeon: Alejandro Lee MD;  Location: [facility];  Service: General   • ESOPHAGOGASTRODUODENOSCOPY N/A 08/24/2016    Procedure: ESOPHAGOGASTRODUODENOSCOPY (EGD); Surgeon: Jenna Araya MD;  Location: [facility];   Service:    • EXPLORATORY LAPAROTOMY W/ BOWEL RESECTION N/A 11/16/2022    Procedure: LAPAROTOMY EXPLORATORY W/ BOWEL RESECTION- VAC PLACEMENT;  Surgeon: Abilio Rizo MD;  [facility]: [facility];  Service: Surgical Oncology   • EXPLORATORY LAPAROTOMY W/ BOWEL RESECTION N/A 11/17/2022    Procedure: LAPAROTOMY EXPLORATORY W/ BOWEL RESECTION;  Surgeon: Abilio Rizo MD;  [facility];  Service: Surgical Oncology   • ILEOSTOMY N/A 11/17/2022    Procedure: ILEOSTOMY;  Surgeon: Abilio Rizo MD;  [facility];  Service: Surgical Oncology   • ILEOSTOMY CLOSURE N/A 11/7/2022    Procedure: REVERSAL COLOSTOMY;  Surgeon: Madeline Walker MD;  [facility];  Service: Surgical Oncology   • IR CHOLECYSTOSTOMY TUBE CHECK/CHANGE/REPOSITION/REINSERTION/UPSIZE  12/12/2022   • IR CHOLECYSTOSTOMY TUBE PLACEMENT  12/8/2022   • IR DRAINAGE TUBE CHECK AND/OR REMOVAL  1/3/2023   • IR DRAINAGE TUBE CHECK/CHANGE/REPOSITION/REINSERTION/UPSIZE  1/12/2023   • IR DRAINAGE TUBE CHECK/CHANGE/REPOSITION/REINSERTION/UPSIZE  1/31/2023   • IR DRAINAGE TUBE PLACEMENT  12/8/2022   • IR DRAINAGE TUBE PLACEMENT  12/20/2022   • IR PORT PLACEMENT  03/10/2022   • IR THORACENTESIS  1/12/2023   • KIDNEY STONE SURGERY     • LIVER BIOPSY LAPAROSCOPIC N/A 02/20/2022    Procedure: DIAGNOSTIC LAPAROSCOPIC LIVER BIOPSY, DIVERTING LOOP COLOSTOMY ;  Surgeon: Srikanth Barton MD;  [facility];  Service: General   • LIVER LOBECTOMY N/A 11/7/2022    Procedure: SEGMENT 3 LIVER RESECTION, ABLATION, INTRAOPERATIVE U/S OF LIVER, PERITONEAL BIOPSY;  Surgeon: Merary Dotson MD;  [facility];  Service: Surgical Oncology   • RIGHT COLON RESECTION N/A 11/7/2022    Procedure: EX LAP, TRANVERSE COLON RESECTION;  Surgeon: Merary Dotson MD;  [facility];  Service: Surgical Oncology   • TONSILLECTOMY     • UMBILICAL HERNIA REPAIR LAPAROSCOPIC N/A 04/26/2019    Procedure: LAPAROSCOPIC UMBILICAL HERNIA REPAIR;  Surgeon: Srikanth Barton MD;  [facility];  Service: General   • VAC DRESSING APPLICATION N/A 67/93/4905    Procedure: APPLICATION VAC DRESSING ABDOMEN/TRUNK;  Surgeon: Edwin Bobo MD;  [facility];  Service: Surgical Oncology       Family History   Problem Relation Age of Onset   • Diabetes Mother         mellitus   • Lung cancer Mother    • Coronary artery disease Father      I have reviewed and agree with the history as documented  E-Cigarette/Vaping   • E-Cigarette Use Never User      E-Cigarette/Vaping Substances   • Nicotine No    • THC No    • CBD No    • Flavoring No    • Other No    • Unknown No      Social History     Tobacco Use   • Smoking status: Never   • Smokeless tobacco: Never   Vaping Use   • Vaping Use: Never used   Substance Use Topics   • Alcohol use: Never   • Drug use: No       Review of Systems   Constitutional: Positive for fatigue  Negative for fever  Respiratory: Negative  Cardiovascular: Negative  Gastrointestinal: Negative  Neurological: Negative  Physical Exam  Physical Exam  Vitals and nursing note reviewed     Constitutional: General: He is not in acute distress  HENT:      Head: Normocephalic and atraumatic  Cardiovascular:      Rate and Rhythm: Regular rhythm  Tachycardia present  Pulmonary:      Effort: Pulmonary effort is normal       Breath sounds: Normal breath sounds  Abdominal:      Comments: Colostomy, 2 wound drains in place  Healing abdominal wounds, no surrounding erythema  Abdomen soft, nondistended, nontender   Musculoskeletal:         General: Normal range of motion  Comments: Tenderness to coccyx, no swelling or deformity, no lumbar spine tenderness  Skin:     General: Skin is warm and dry  Neurological:      General: No focal deficit present  Mental Status: He is alert and oriented to person, place, and time  Comments: 5 out of 5 strength bilateral lower extremities         Vital Signs  ED Triage Vitals   Temperature Pulse Respirations Blood Pressure SpO2   02/02/23 1202 02/02/23 1105 02/02/23 1105 02/02/23 1105 02/02/23 1105   97 6 °F (36 4 °C) (!) 110 17 100/62 98 %      Temp Source Heart Rate Source Patient Position - Orthostatic VS BP Location FiO2 (%)   02/02/23 1202 02/02/23 1105 02/02/23 1105 02/02/23 1105 --   Oral Monitor Lying Left arm       Pain Score       02/02/23 1202       2           Vitals:    02/02/23 1105   BP: 100/62   Pulse: (!) 110   Patient Position - Orthostatic VS: Lying         Visual Acuity      ED Medications  Medications - No data to display    Diagnostic Studies  Results Reviewed     None                 No orders to display              Procedures  Procedures         ED Course                                             Medical Decision Making  50-year-old male, presents with fall on buttocks  Differential diagnosis includes coccyx fracture, contusion, among other diagnoses  On exam, patient is able to sit up without difficulty, has tenderness to coccyx area, no swelling or deformity, no lumbar spine tenderness    Discussed with patient wife diagnosis of coccyx contusion versus fracture, no change in management, offered to do x-ray to which patient declines at this time  States his pain is improving  Patient with multiple chronic medical issues including generalized weakness from recent hospitalization, has been in inpatient rehab  Patient feels well to go home at this time, has follow-up scheduled with his doctors  Instructed to follow-up or return for any new concerning symptoms  Contusion of coccyx, initial encounter: acute illness or injury  Amount and/or Complexity of Data Reviewed  External Data Reviewed: notes  Disposition  Final diagnoses:   Contusion of coccyx, initial encounter     Time reflects when diagnosis was documented in both MDM as applicable and the Disposition within this note     Time User Action Codes Description Comment    2/2/2023 12:10 PM Mar[address]  [S30  0XXA] Contusion of coccyx, initial encounter       ED Disposition     ED Disposition   Discharge    Condition   Stable    Date/Time   Thu Feb 2, 2023 12:10 PM    Comment   Carmella Sommer discharge to home/self care  Follow-up Information     Follow up With Specialties Details Why Dia Diallo MD Internal Medicine Schedule an appointment as soon as possible for a visit   [address]  [phone]            Patient's Medications   Discharge Prescriptions    No medications on file       No discharge procedures on file      PDMP Review       Value Time User    PDMP Reviewed  Yes 1/9/2023  2:31 PM Neptali Menon           ED Provider  Electronically Signed by           Saman Toleod MD  02/02/23 3020

## 2023-02-02 NOTE — PROGRESS NOTES
Virtual Regular Visit    Verification of patient location:    Patient is located in the following state in which I hold an active license [place]      Assessment/Plan:    Problem List Items Addressed This Visit        Digestive    Colon cancer metastasized to liver [facility])       Endocrine    Type 2 diabetes mellitus without complication, with long-term current use of insulin ([facility] [address] ) - Primary       Cardiovascular and Mediastinum    Benign essential hypertension       Genitourinary    Acute on chronic kidney failure ([facility] [address] )       Other    Dehiscence of incision            Reason for visit is No chief complaint on file  Encounter provider Bethany Garnett MD    Provider located at [address] [place] [employer] [address]      Recent Visits  Date Type Provider Dept   01/31/23 Telephone Leonel Leslie [address] recent visits within past 7 days and meeting all other requirements  Today's Visits  Date Type Provider Dept   02/02/23 Telemedicine Bethany Garnett  [address] today's visits and meeting all other requirements  Future Appointments  No visits were found meeting these conditions  Showing future appointments within next 150 days and meeting all other requirements       The patient was identified by name and date of birth  Julianomurtaza Adolfo was informed that this is a telemedicine visit and that the visit is being conducted through the [employer] platform  He agrees to proceed     My office door was closed  No one else was in the room  He acknowledged consent and understanding of privacy and security of the video platform  The patient has agreed to participate and understands they can discontinue the visit at any time  Patient is aware this is a billable service       Geni Brewster is a 61 y o  male who has metastatic colon cancer and after the surgery had dehiscence of the wound and sepsis  He has not been doing well  He has home health nurses come twice a week for wound care and physical therapy twice a week for ambulatory dysfunction  His blood pressure has been running low and his blood glucose levels have been running low as well as he is not eating well  Today he fell and after that was taken to the ER but they sent him home  He also had a follow-up appointment with the surgeon today  Ana Nogueira He has a positive urine culture for which he was given ampicillin today  HPI  Follow-up        Past Medical History:   Diagnosis Date   • Abdominal pain 03/12/2022   • Acute renal failure ([place] [address] )     [id number] resolved   • Acute respiratory failure with hypoxia ([place] [address] ) 11/12/2022   • Bacteremia 11/12/2022   • Cancer ([place] [address] )    • Diabetes mellitus ([place] [address] )    • Enteritis 08/23/2016   • Flash pulmonary edema ([place] [address] ) 11/12/2022   • Gastroparesis due to DM ([place] [address] ) 08/23/2016   • GERD (gastroesophageal reflux disease)    • Hernia, ventral 08/04/2016   • Hyperlipidemia    • Hypertension    • Morbid obesity ([place] [address] ) 04/17/2018   • Postoperative visit 03/02/2022   • SIRS (systemic inflammatory response syndrome) ([place] [address] ) 03/12/2022   • Snoring    • Stage 3a chronic kidney disease ([place] [address] ) 02/19/2022       Past Surgical History:   Procedure Laterality Date   • COLONOSCOPY     • COLOSTOMY N/A 02/20/2022    Procedure: COLOSTOMY LOOP, diverting;  Surgeon: Jesenia Roberts MD;  Location: [facility];  Service: General   • ESOPHAGOGASTRODUODENOSCOPY N/A 08/24/2016    Procedure: ESOPHAGOGASTRODUODENOSCOPY (EGD); Surgeon: Fay Arroyo MD;  Location: [facility];   Service:    • EXPLORATORY LAPAROTOMY W/ BOWEL RESECTION N/A 11/16/2022    Procedure: LAPAROTOMY EXPLORATORY W/ BOWEL RESECTION- VAC PLACEMENT;  Surgeon: Thomas Walters MD;  Location:  MAIN OR;  Service: Surgical Oncology   • EXPLORATORY LAPAROTOMY W/ BOWEL RESECTION N/A 11/17/2022    Procedure: LAPAROTOMY EXPLORATORY W/ BOWEL RESECTION;  Surgeon: Thomas Walters MD; [facility]: [facility];  Service: Surgical Oncology   • ILEOSTOMY N/A 11/17/2022    Procedure: ILEOSTOMY;  Surgeon: Mel Dailey MD;  [facility];  Service: Surgical Oncology   • ILEOSTOMY CLOSURE N/A 11/7/2022    Procedure: REVERSAL COLOSTOMY;  Surgeon: Lukasz Marina MD;  [facility];  Service: Surgical Oncology   • IR CHOLECYSTOSTOMY TUBE CHECK/CHANGE/REPOSITION/REINSERTION/UPSIZE  12/12/2022   • IR CHOLECYSTOSTOMY TUBE PLACEMENT  12/8/2022   • IR DRAINAGE TUBE CHECK AND/OR REMOVAL  1/3/2023   • IR DRAINAGE TUBE CHECK/CHANGE/REPOSITION/REINSERTION/UPSIZE  1/12/2023   • IR DRAINAGE TUBE CHECK/CHANGE/REPOSITION/REINSERTION/UPSIZE  1/31/2023   • IR DRAINAGE TUBE PLACEMENT  12/8/2022   • IR DRAINAGE TUBE PLACEMENT  12/20/2022   • IR PORT PLACEMENT  03/10/2022   • IR THORACENTESIS  1/12/2023   • KIDNEY STONE SURGERY     • LIVER BIOPSY LAPAROSCOPIC N/A 02/20/2022    Procedure: DIAGNOSTIC LAPAROSCOPIC LIVER BIOPSY, DIVERTING LOOP COLOSTOMY ;  Surgeon: Willem Salomon MD;  [facility];  Service: General   • LIVER LOBECTOMY N/A 11/7/2022    Procedure: SEGMENT 3 LIVER RESECTION, ABLATION, INTRAOPERATIVE U/S OF LIVER, PERITONEAL BIOPSY;  Surgeon: Lukasz Marina MD;  [facility];  Service: Surgical Oncology   • RIGHT COLON RESECTION N/A 11/7/2022    Procedure: EX LAP, TRANVERSE COLON RESECTION;  Surgeon: Lukasz Marina MD;  [facility];  Service: Surgical Oncology   • TONSILLECTOMY     • UMBILICAL HERNIA REPAIR LAPAROSCOPIC N/A 04/26/2019    Procedure: LAPAROSCOPIC UMBILICAL HERNIA REPAIR;  Surgeon: Willem Salomon MD;  [facility];  Service: General   • VAC DRESSING APPLICATION N/A 65/60/4995    Procedure: APPLICATION VAC DRESSING ABDOMEN/TRUNK;  Surgeon: Mel Dailey MD;  [facility];  Service: Surgical Oncology       Current Outpatient Medications   Medication Sig Dispense Refill   • acetaminophen (TYLENOL) 325 mg tablet Take 1 tablet (325 mg total) by mouth every 4 (four) hours as needed for mild pain  0   • amLODIPine (NORVASC) 5 mg tablet Take 1 tablet (5 mg total) by mouth 2 (two) times a day 60 tablet 0   • ampicillin (PRINCIPEN) 500 mg capsule Take 1 capsule (500 mg total) by mouth 4 (four) times a day for 7 days 28 capsule 0   • aspirin 81 MG tablet Take 81 mg by mouth daily  • atorvastatin (LIPITOR) 40 mg tablet Take 1 tablet (40 mg total) by mouth daily 30 tablet 0   • Cholecalciferol (VITAMIN D3 PO) Take 400 Units by mouth daily     • collagenase (SANTYL) ointment Apply topically daily To the abdominal incision area 90 g 0   • Continuous Blood Gluc Sensor (UnicaStyle Parrish 14 Day Sensor) MISC Use 1 each every 14 (fourteen) days 2 each 6   • DULoxetine (CYMBALTA) 30 mg delayed release capsule Take 1 capsule (30 mg total) by mouth daily Do not start before January 17, 2023  30 capsule 0   • Insulin Glargine Solostar (Lantus SoloStar) 100 UNIT/ML SOPN Inject 0 1 mL (10 Units total) under the skin daily at bedtime 15 mL 0   • insulin lispro (HumaLOG KwikPen) 100 units/mL injection pen Inject 4 Units under the skin 3 (three) times a day with meals 15 mL 0   • Insulin Pen Needle (B-D UF III MINI PEN NEEDLES) 31G X 5 MM MISC Inject under the skin daily 100 each 3   • Insulin Pen Needle (BD Pen Needle Jessica 2nd Gen) 32G X 4 MM MISC For use with insulin pen  Pharmacy may dispense brand covered by insurance  100 each 0   • Insulin Pen Needle (BD Pen Needle Jessica 2nd Gen) 32G X 4 MM MISC For use with insulin pen  Pharmacy may dispense brand covered by insurance  100 each 0   • methocarbamol (ROBAXIN) 500 mg tablet Take 1 tablet (500 mg total) by mouth 2 (two) times a day 20 tablet 0   • Multiple Vitamins-Minerals (multivitamin with minerals) tablet Take 1 tablet by mouth daily     • naloxone (NARCAN) 4 mg/0 1 mL nasal spray Administer 1 spray into a nostril  If no response after 2-3 minutes, give another dose in the other nostril using a new spray   1 each 1   • Needle, Disp, (HYPODERMIC NEEDLE) 23G X 1-1/2" MISC by Does not apply route once a week 10 each 6   • ondansetron (ZOFRAN-ODT) 4 mg disintegrating tablet Take 1 tablet (4 mg total) by mouth every 6 (six) hours as needed (nausea,vomiting) 20 tablet 0   • Ostomy Supplies MISC Use in the morning 100 each 3   • oxyCODONE (ROXICODONE) 10 MG TABS Take 1 tablet (10 mg total) by mouth every 6 (six) hours as needed for severe pain Max Daily Amount: 40 mg 90 tablet 0   • pantoprazole (PROTONIX) 40 mg tablet Take 1 tablet (40 mg total) by mouth 2 (two) times a day 60 tablet 0   • simethicone (MYLICON) 80 mg chewable tablet Chew 1 tablet (80 mg total) 4 (four) times a day (with meals and at bedtime) 120 tablet 0   • sodium chloride, PF, 0 9 % 10 mL by Intracatheter route daily 300 mL 0   • tamsulosin (FLOMAX) 0 4 mg Take 1 capsule (0 4 mg total) by mouth daily with dinner  0     No current facility-administered medications for this visit  Allergies   Allergen Reactions   • Shellfish-Derived Products - Food Allergy Anaphylaxis   • Erythromycin GI Intolerance       Review of Systems   Constitutional: Positive for activity change, appetite change, chills and fatigue  Negative for diaphoresis and fever  HENT: Negative for congestion, ear discharge, ear pain, hearing loss, postnasal drip, rhinorrhea, sinus pressure, sinus pain, sneezing, sore throat and voice change  Eyes: Negative for pain, discharge, redness and visual disturbance  Respiratory: Negative for cough, chest tightness, shortness of breath and wheezing  Cardiovascular: Negative for chest pain, palpitations and leg swelling  Gastrointestinal: Negative for abdominal distention, abdominal pain, blood in stool, constipation, diarrhea, nausea and vomiting  Endocrine: Negative for cold intolerance, heat intolerance, polydipsia, polyphagia and polyuria  Genitourinary: Negative for dysuria, flank pain, frequency, hematuria and urgency     Musculoskeletal: Positive for arthralgias, gait problem and myalgias  Negative for back pain, joint swelling, neck pain and neck stiffness  Skin: Negative for rash  Neurological: Positive for weakness  Negative for dizziness, tremors, syncope, facial asymmetry, speech difficulty, light-headedness, numbness and headaches  Hematological: Does not bruise/bleed easily  Psychiatric/Behavioral: Negative for behavioral problems, confusion and sleep disturbance  The patient is not nervous/anxious  Video Exam    There were no vitals filed for this visit  Physical Exam  Constitutional:       Appearance: He is ill-appearing  HENT:      Head: Normocephalic  Eyes:      Conjunctiva/sclera: Conjunctivae normal    Pulmonary:      Effort: Pulmonary effort is normal    Neurological:      Mental Status: He is alert and oriented to person, place, and time  Psychiatric:         Behavior: Behavior normal      He was advised to decrease the dose of amlodipine to 2 5 mg daily and it was sent to the pharmacy as his blood pressure has been running low and it could cause dizziness and falls  His blood glucose levels have been also running low  He was told to decrease the dose of Lantus to 8 units daily  He was told to keep himself well-hydrated and take care of the wound        I spent 20 minutes with patient today in which greater than 50% of the time was spent in counseling/coordination of care regarding Urinary tract infection, wound infection, type 2 diabetes, hypertension, ambulatory dysfunction

## 2023-02-03 DIAGNOSIS — C18.9 COLON CANCER METASTASIZED TO LIVER (HCC): Primary | ICD-10-CM

## 2023-02-03 DIAGNOSIS — C18.4 MALIGNANT NEOPLASM OF TRANSVERSE COLON (HCC): Primary | ICD-10-CM

## 2023-02-03 DIAGNOSIS — C78.7 COLON CANCER METASTASIZED TO LIVER (HCC): Primary | ICD-10-CM

## 2023-02-03 DIAGNOSIS — C18.9 COLON CANCER METASTASIZED TO LIVER (HCC): ICD-10-CM

## 2023-02-03 DIAGNOSIS — C78.7 COLON CANCER METASTASIZED TO LIVER (HCC): ICD-10-CM

## 2023-02-03 DIAGNOSIS — N30.00 ACUTE CYSTITIS WITHOUT HEMATURIA: ICD-10-CM

## 2023-02-03 RX ORDER — SULFAMETHOXAZOLE AND TRIMETHOPRIM 800; 160 MG/1; MG/1
1 TABLET ORAL EVERY 12 HOURS SCHEDULED
Qty: 20 TABLET | Refills: 0 | Status: ON HOLD | OUTPATIENT
Start: 2023-02-03 | End: 2023-02-13

## 2023-02-05 ENCOUNTER — TELEPHONE (OUTPATIENT)
Dept: OTHER | Facility: OTHER | Age: 58
End: 2023-02-05

## 2023-02-05 DIAGNOSIS — N39.0 URINARY TRACT INFECTION WITH HEMATURIA, SITE UNSPECIFIED: Primary | ICD-10-CM

## 2023-02-05 DIAGNOSIS — R31.9 URINARY TRACT INFECTION WITH HEMATURIA, SITE UNSPECIFIED: Primary | ICD-10-CM

## 2023-02-05 RX ORDER — AMOXICILLIN 500 MG/1
500 TABLET, FILM COATED ORAL 2 TIMES DAILY
Qty: 14 TABLET | Refills: 1 | Status: ON HOLD | OUTPATIENT
Start: 2023-02-05 | End: 2023-02-12

## 2023-02-05 NOTE — TELEPHONE ENCOUNTER
Patient called to advise that Bactrim is causing stomach problems for him, today he woke up vomiting  He states the medication Dr Laurie Reese originally wanted for him (Ampicillin) is available at [facility] on [address] in [facility] in 250 or 500mg  He would like to know if it could be switched       On call paged

## 2023-02-07 ENCOUNTER — APPOINTMENT (EMERGENCY)
Dept: CT IMAGING | Facility: HOSPITAL | Age: 58
End: 2023-02-07

## 2023-02-07 ENCOUNTER — TELEPHONE (OUTPATIENT)
Dept: INTERNAL MEDICINE CLINIC | Facility: CLINIC | Age: 58
End: 2023-02-07

## 2023-02-07 ENCOUNTER — PATIENT OUTREACH (OUTPATIENT)
Dept: CASE MANAGEMENT | Facility: HOSPITAL | Age: 58
End: 2023-02-07

## 2023-02-07 ENCOUNTER — TELEPHONE (OUTPATIENT)
Dept: HEMATOLOGY ONCOLOGY | Facility: CLINIC | Age: 58
End: 2023-02-07

## 2023-02-07 ENCOUNTER — TELEPHONE (OUTPATIENT)
Dept: SURGICAL ONCOLOGY | Facility: CLINIC | Age: 58
End: 2023-02-07

## 2023-02-07 ENCOUNTER — HOSPITAL ENCOUNTER (INPATIENT)
Facility: HOSPITAL | Age: 58
LOS: 17 days | End: 2023-02-24
Attending: EMERGENCY MEDICINE | Admitting: INTERNAL MEDICINE

## 2023-02-07 DIAGNOSIS — T14.8XXA WOUND INFECTION: ICD-10-CM

## 2023-02-07 DIAGNOSIS — C18.9 COLON CANCER METASTASIZED TO LIVER (HCC): ICD-10-CM

## 2023-02-07 DIAGNOSIS — N18.31 ACUTE RENAL FAILURE SUPERIMPOSED ON STAGE 3A CHRONIC KIDNEY DISEASE, UNSPECIFIED ACUTE RENAL FAILURE TYPE (HCC): ICD-10-CM

## 2023-02-07 DIAGNOSIS — E11.9 TYPE 2 DIABETES MELLITUS WITHOUT COMPLICATION, WITH LONG-TERM CURRENT USE OF INSULIN (HCC): ICD-10-CM

## 2023-02-07 DIAGNOSIS — A41.9 SEPSIS (HCC): Primary | ICD-10-CM

## 2023-02-07 DIAGNOSIS — A41.9 SEPSIS, DUE TO UNSPECIFIED ORGANISM, UNSPECIFIED WHETHER ACUTE ORGAN DYSFUNCTION PRESENT (HCC): ICD-10-CM

## 2023-02-07 DIAGNOSIS — N17.9 ACUTE KIDNEY INJURY (HCC): ICD-10-CM

## 2023-02-07 DIAGNOSIS — L08.9 WOUND INFECTION: ICD-10-CM

## 2023-02-07 DIAGNOSIS — N17.9 ACUTE RENAL FAILURE SUPERIMPOSED ON STAGE 3A CHRONIC KIDNEY DISEASE, UNSPECIFIED ACUTE RENAL FAILURE TYPE (HCC): ICD-10-CM

## 2023-02-07 DIAGNOSIS — K94.09 COLOSTOMY PROLAPSE (HCC): ICD-10-CM

## 2023-02-07 DIAGNOSIS — C22.8 METASTASIS FROM MALIGNANT NEOPLASM OF LIVER (HCC): ICD-10-CM

## 2023-02-07 DIAGNOSIS — C78.7 COLON CANCER METASTASIZED TO LIVER (HCC): ICD-10-CM

## 2023-02-07 DIAGNOSIS — C79.9 METASTASIS FROM MALIGNANT NEOPLASM OF LIVER (HCC): ICD-10-CM

## 2023-02-07 DIAGNOSIS — Z79.4 TYPE 2 DIABETES MELLITUS WITHOUT COMPLICATION, WITH LONG-TERM CURRENT USE OF INSULIN (HCC): ICD-10-CM

## 2023-02-07 PROBLEM — E87.1 HYPONATREMIA: Status: ACTIVE | Noted: 2023-02-07

## 2023-02-07 PROBLEM — E87.5 HYPERKALEMIA: Status: ACTIVE | Noted: 2023-02-07

## 2023-02-07 PROBLEM — R93.89 ABNORMAL CT SCAN: Status: ACTIVE | Noted: 2023-02-07

## 2023-02-07 LAB
ALBUMIN SERPL BCP-MCNC: 1.7 G/DL (ref 3.5–5)
ALP SERPL-CCNC: 1300 U/L (ref 46–116)
ALT SERPL W P-5'-P-CCNC: 26 U/L (ref 12–78)
ANION GAP SERPL CALCULATED.3IONS-SCNC: 15 MMOL/L (ref 4–13)
ANION GAP SERPL CALCULATED.3IONS-SCNC: 16 MMOL/L (ref 4–13)
AST SERPL W P-5'-P-CCNC: 62 U/L (ref 5–45)
ATRIAL RATE: 115 BPM
BACTERIA UR QL AUTO: ABNORMAL /HPF
BILIRUB SERPL-MCNC: 0.84 MG/DL (ref 0.2–1)
BILIRUB UR QL STRIP: NEGATIVE
BUN SERPL-MCNC: 60 MG/DL (ref 5–25)
BUN SERPL-MCNC: 69 MG/DL (ref 5–25)
CALCIUM ALBUM COR SERPL-MCNC: 11.2 MG/DL (ref 8.3–10.1)
CALCIUM SERPL-MCNC: 8.8 MG/DL (ref 8.3–10.1)
CALCIUM SERPL-MCNC: 9.4 MG/DL (ref 8.3–10.1)
CHLORIDE SERPL-SCNC: 101 MMOL/L (ref 96–108)
CHLORIDE SERPL-SCNC: 98 MMOL/L (ref 96–108)
CLARITY UR: CLEAR
CO2 SERPL-SCNC: 15 MMOL/L (ref 21–32)
CO2 SERPL-SCNC: 16 MMOL/L (ref 21–32)
COLOR UR: ABNORMAL
CREAT SERPL-MCNC: 2.2 MG/DL (ref 0.6–1.3)
CREAT SERPL-MCNC: 2.52 MG/DL (ref 0.6–1.3)
ERYTHROCYTE [DISTWIDTH] IN BLOOD BY AUTOMATED COUNT: 17.9 % (ref 11.6–15.1)
FLUAV RNA RESP QL NAA+PROBE: NEGATIVE
FLUBV RNA RESP QL NAA+PROBE: NEGATIVE
GFR SERPL CREATININE-BSD FRML MDRD: 27 ML/MIN/1.73SQ M
GFR SERPL CREATININE-BSD FRML MDRD: 31 ML/MIN/1.73SQ M
GLUCOSE SERPL-MCNC: 179 MG/DL (ref 65–140)
GLUCOSE SERPL-MCNC: 222 MG/DL (ref 65–140)
GLUCOSE SERPL-MCNC: 298 MG/DL (ref 65–140)
GLUCOSE UR STRIP-MCNC: NEGATIVE MG/DL
HCT VFR BLD AUTO: 32.2 % (ref 36.5–49.3)
HGB BLD-MCNC: 9.8 G/DL (ref 12–17)
HGB UR QL STRIP.AUTO: NEGATIVE
INR PPP: 1.32 (ref 0.84–1.19)
KETONES UR STRIP-MCNC: NEGATIVE MG/DL
LACTATE SERPL-SCNC: 1.2 MMOL/L (ref 0.5–2)
LACTATE SERPL-SCNC: 2.4 MMOL/L (ref 0.5–2)
LEUKOCYTE ESTERASE UR QL STRIP: NEGATIVE
MCH RBC QN AUTO: 26 PG (ref 26.8–34.3)
MCHC RBC AUTO-ENTMCNC: 30.4 G/DL (ref 31.4–37.4)
MCV RBC AUTO: 85 FL (ref 82–98)
NITRITE UR QL STRIP: NEGATIVE
NON-SQ EPI CELLS URNS QL MICRO: ABNORMAL /HPF
P AXIS: 63 DEGREES
PH UR STRIP.AUTO: 5 [PH]
PLATELET # BLD AUTO: 669 THOUSANDS/UL (ref 149–390)
PLATELET BLD QL SMEAR: ABNORMAL
PMV BLD AUTO: 9.2 FL (ref 8.9–12.7)
POTASSIUM SERPL-SCNC: 5.1 MMOL/L (ref 3.5–5.3)
POTASSIUM SERPL-SCNC: 5.9 MMOL/L (ref 3.5–5.3)
PR INTERVAL: 156 MS
PROCALCITONIN SERPL-MCNC: 1.07 NG/ML
PROT SERPL-MCNC: 9.8 G/DL (ref 6.4–8.4)
PROT UR STRIP-MCNC: ABNORMAL MG/DL
PROTHROMBIN TIME: 16.1 SECONDS (ref 11.6–14.5)
QRS AXIS: 224 DEGREES
QRSD INTERVAL: 128 MS
QT INTERVAL: 348 MS
QTC INTERVAL: 481 MS
RBC # BLD AUTO: 3.77 MILLION/UL (ref 3.88–5.62)
RBC #/AREA URNS AUTO: ABNORMAL /HPF
RSV RNA RESP QL NAA+PROBE: NEGATIVE
SARS-COV-2 RNA RESP QL NAA+PROBE: NEGATIVE
SODIUM SERPL-SCNC: 129 MMOL/L (ref 135–147)
SODIUM SERPL-SCNC: 132 MMOL/L (ref 135–147)
SP GR UR STRIP.AUTO: 1.01 (ref 1–1.03)
T WAVE AXIS: 29 DEGREES
TARGETS BLD QL SMEAR: PRESENT
UROBILINOGEN UR STRIP-ACNC: <2 MG/DL
VENTRICULAR RATE: 115 BPM
WBC # BLD AUTO: 28 THOUSAND/UL (ref 4.31–10.16)
WBC #/AREA URNS AUTO: ABNORMAL /HPF

## 2023-02-07 RX ORDER — SIMETHICONE 80 MG
80 TABLET,CHEWABLE ORAL
Status: DISCONTINUED | OUTPATIENT
Start: 2023-02-07 | End: 2023-02-24 | Stop reason: HOSPADM

## 2023-02-07 RX ORDER — INSULIN GLARGINE 100 [IU]/ML
8 INJECTION, SOLUTION SUBCUTANEOUS
Status: DISCONTINUED | OUTPATIENT
Start: 2023-02-07 | End: 2023-02-07

## 2023-02-07 RX ORDER — OXYCODONE HYDROCHLORIDE 10 MG/1
10 TABLET ORAL EVERY 6 HOURS PRN
Status: DISCONTINUED | OUTPATIENT
Start: 2023-02-07 | End: 2023-02-08

## 2023-02-07 RX ORDER — ATORVASTATIN CALCIUM 40 MG/1
40 TABLET, FILM COATED ORAL DAILY
Status: DISCONTINUED | OUTPATIENT
Start: 2023-02-08 | End: 2023-02-24 | Stop reason: HOSPADM

## 2023-02-07 RX ORDER — INSULIN LISPRO 100 [IU]/ML
1-6 INJECTION, SOLUTION INTRAVENOUS; SUBCUTANEOUS
Status: DISCONTINUED | OUTPATIENT
Start: 2023-02-07 | End: 2023-02-24 | Stop reason: HOSPADM

## 2023-02-07 RX ORDER — INSULIN LISPRO 100 [IU]/ML
1-6 INJECTION, SOLUTION INTRAVENOUS; SUBCUTANEOUS
Status: DISCONTINUED | OUTPATIENT
Start: 2023-02-08 | End: 2023-02-24 | Stop reason: HOSPADM

## 2023-02-07 RX ORDER — ONDANSETRON 2 MG/ML
4 INJECTION INTRAMUSCULAR; INTRAVENOUS ONCE
Status: COMPLETED | OUTPATIENT
Start: 2023-02-07 | End: 2023-02-07

## 2023-02-07 RX ORDER — INSULIN LISPRO 100 [IU]/ML
4 INJECTION, SOLUTION INTRAVENOUS; SUBCUTANEOUS
Status: DISCONTINUED | OUTPATIENT
Start: 2023-02-08 | End: 2023-02-24 | Stop reason: HOSPADM

## 2023-02-07 RX ORDER — SODIUM CHLORIDE, SODIUM GLUCONATE, SODIUM ACETATE, POTASSIUM CHLORIDE, MAGNESIUM CHLORIDE, SODIUM PHOSPHATE, DIBASIC, AND POTASSIUM PHOSPHATE .53; .5; .37; .037; .03; .012; .00082 G/100ML; G/100ML; G/100ML; G/100ML; G/100ML; G/100ML; G/100ML
75 INJECTION, SOLUTION INTRAVENOUS CONTINUOUS
Status: DISCONTINUED | OUTPATIENT
Start: 2023-02-07 | End: 2023-02-11

## 2023-02-07 RX ORDER — METHOCARBAMOL 500 MG/1
500 TABLET, FILM COATED ORAL 2 TIMES DAILY PRN
Status: DISCONTINUED | OUTPATIENT
Start: 2023-02-07 | End: 2023-02-24 | Stop reason: HOSPADM

## 2023-02-07 RX ORDER — PANTOPRAZOLE SODIUM 40 MG/1
40 TABLET, DELAYED RELEASE ORAL 2 TIMES DAILY
Status: DISCONTINUED | OUTPATIENT
Start: 2023-02-07 | End: 2023-02-24 | Stop reason: HOSPADM

## 2023-02-07 RX ORDER — INSULIN GLARGINE 100 [IU]/ML
5 INJECTION, SOLUTION SUBCUTANEOUS
Status: DISCONTINUED | OUTPATIENT
Start: 2023-02-08 | End: 2023-02-24 | Stop reason: HOSPADM

## 2023-02-07 RX ORDER — OXYCODONE HYDROCHLORIDE 5 MG/1
5 TABLET ORAL EVERY 6 HOURS PRN
Status: DISCONTINUED | OUTPATIENT
Start: 2023-02-07 | End: 2023-02-08

## 2023-02-07 RX ORDER — SODIUM CHLORIDE, SODIUM GLUCONATE, SODIUM ACETATE, POTASSIUM CHLORIDE, MAGNESIUM CHLORIDE, SODIUM PHOSPHATE, DIBASIC, AND POTASSIUM PHOSPHATE .53; .5; .37; .037; .03; .012; .00082 G/100ML; G/100ML; G/100ML; G/100ML; G/100ML; G/100ML; G/100ML
1000 INJECTION, SOLUTION INTRAVENOUS ONCE
Status: COMPLETED | OUTPATIENT
Start: 2023-02-07 | End: 2023-02-07

## 2023-02-07 RX ORDER — SODIUM CHLORIDE 9 MG/ML
3 INJECTION INTRAVENOUS ONCE
Status: COMPLETED | OUTPATIENT
Start: 2023-02-07 | End: 2023-02-07

## 2023-02-07 RX ORDER — AMLODIPINE BESYLATE 2.5 MG/1
2.5 TABLET ORAL DAILY
Status: DISCONTINUED | OUTPATIENT
Start: 2023-02-08 | End: 2023-02-24 | Stop reason: HOSPADM

## 2023-02-07 RX ORDER — ASPIRIN 81 MG/1
81 TABLET, CHEWABLE ORAL DAILY
Status: DISCONTINUED | OUTPATIENT
Start: 2023-02-08 | End: 2023-02-24 | Stop reason: HOSPADM

## 2023-02-07 RX ORDER — HEPARIN SODIUM 5000 [USP'U]/ML
5000 INJECTION, SOLUTION INTRAVENOUS; SUBCUTANEOUS EVERY 8 HOURS SCHEDULED
Status: DISCONTINUED | OUTPATIENT
Start: 2023-02-07 | End: 2023-02-24 | Stop reason: HOSPADM

## 2023-02-07 RX ORDER — DULOXETIN HYDROCHLORIDE 30 MG/1
30 CAPSULE, DELAYED RELEASE ORAL DAILY
Status: DISCONTINUED | OUTPATIENT
Start: 2023-02-08 | End: 2023-02-24 | Stop reason: HOSPADM

## 2023-02-07 RX ORDER — TAMSULOSIN HYDROCHLORIDE 0.4 MG/1
0.4 CAPSULE ORAL
Status: DISCONTINUED | OUTPATIENT
Start: 2023-02-08 | End: 2023-02-24 | Stop reason: HOSPADM

## 2023-02-07 RX ORDER — PROMETHAZINE HYDROCHLORIDE 25 MG/ML
25 INJECTION, SOLUTION INTRAMUSCULAR; INTRAVENOUS ONCE
Status: COMPLETED | OUTPATIENT
Start: 2023-02-07 | End: 2023-02-07

## 2023-02-07 RX ADMIN — PIPERACILLIN AND TAZOBACTAM 3.38 G: 36; 4.5 INJECTION, POWDER, FOR SOLUTION INTRAVENOUS at 17:39

## 2023-02-07 RX ADMIN — INSULIN LISPRO 1 UNITS: 100 INJECTION, SOLUTION INTRAVENOUS; SUBCUTANEOUS at 23:35

## 2023-02-07 RX ADMIN — SIMETHICONE 80 MG: 80 TABLET, CHEWABLE ORAL at 21:18

## 2023-02-07 RX ADMIN — METHOCARBAMOL 500 MG: 500 TABLET ORAL at 21:18

## 2023-02-07 RX ADMIN — PIPERACILLIN AND TAZOBACTAM 2.25 G: 2; .25 INJECTION, POWDER, LYOPHILIZED, FOR SOLUTION INTRAVENOUS at 23:52

## 2023-02-07 RX ADMIN — PANTOPRAZOLE SODIUM 40 MG: 40 TABLET, DELAYED RELEASE ORAL at 20:11

## 2023-02-07 RX ADMIN — OXYCODONE HYDROCHLORIDE 10 MG: 10 TABLET ORAL at 20:11

## 2023-02-07 RX ADMIN — PROMETHAZINE HYDROCHLORIDE 25 MG: 25 INJECTION INTRAMUSCULAR; INTRAVENOUS at 21:17

## 2023-02-07 RX ADMIN — SODIUM CHLORIDE, SODIUM GLUCONATE, SODIUM ACETATE, POTASSIUM CHLORIDE, MAGNESIUM CHLORIDE, SODIUM PHOSPHATE, DIBASIC, AND POTASSIUM PHOSPHATE 1000 ML: .53; .5; .37; .037; .03; .012; .00082 INJECTION, SOLUTION INTRAVENOUS at 17:54

## 2023-02-07 RX ADMIN — HEPARIN SODIUM 5000 UNITS: 5000 INJECTION INTRAVENOUS; SUBCUTANEOUS at 23:34

## 2023-02-07 RX ADMIN — SODIUM CHLORIDE, SODIUM GLUCONATE, SODIUM ACETATE, POTASSIUM CHLORIDE, MAGNESIUM CHLORIDE, SODIUM PHOSPHATE, DIBASIC, AND POTASSIUM PHOSPHATE 75 ML/HR: .53; .5; .37; .037; .03; .012; .00082 INJECTION, SOLUTION INTRAVENOUS at 20:11

## 2023-02-07 RX ADMIN — SODIUM CHLORIDE, SODIUM GLUCONATE, SODIUM ACETATE, POTASSIUM CHLORIDE, MAGNESIUM CHLORIDE, SODIUM PHOSPHATE, DIBASIC, AND POTASSIUM PHOSPHATE 1000 ML: .53; .5; .37; .037; .03; .012; .00082 INJECTION, SOLUTION INTRAVENOUS at 15:30

## 2023-02-07 RX ADMIN — INSULIN GLARGINE 8 UNITS: 100 INJECTION, SOLUTION SUBCUTANEOUS at 21:19

## 2023-02-07 RX ADMIN — SODIUM CHLORIDE 3 ML: 9 INJECTION, SOLUTION INTRAVENOUS at 15:30

## 2023-02-07 RX ADMIN — SODIUM CHLORIDE, SODIUM GLUCONATE, SODIUM ACETATE, POTASSIUM CHLORIDE, MAGNESIUM CHLORIDE, SODIUM PHOSPHATE, DIBASIC, AND POTASSIUM PHOSPHATE 1000 ML: .53; .5; .37; .037; .03; .012; .00082 INJECTION, SOLUTION INTRAVENOUS at 16:44

## 2023-02-07 RX ADMIN — ONDANSETRON 4 MG: 2 INJECTION INTRAMUSCULAR; INTRAVENOUS at 15:41

## 2023-02-07 NOTE — TELEPHONE ENCOUNTER
Spoke to patient regarding his concerns  Patient is complaining of trouble breathing, upset stomach from antibiotic, and foul smell from abdominal incision  Informed patient he should go to the emergency room because he is having trouble breathing  They can also assess the abdominal incision and provide IV abx if he is admitted

## 2023-02-07 NOTE — TELEPHONE ENCOUNTER
CALL RETURN FORM   Reason for patient call? Patient states Dr Thomas Donohue gave him Amoxicillin and he is very nauseous and has been throwing up all weekend along  Patient's primary oncologist?  Dr Thomas Donohue    Name of person the patient was calling for? Dr Naomi Araya team   Any additional information to add, if applicable? N/A   Informed patient that the message will be forwarded to the team and someone will get back to them as soon as possible    Did you relay this information to the patient?  Yes

## 2023-02-07 NOTE — ED PROVIDER NOTES
History  Chief Complaint   Patient presents with   • Abdominal Pain     Pt has a wound to abdomen after surgery for colon ca  Vomiting since Sunday  Discharged from hospital 3 weeks ago  Also reports generalized weakness and thinks he has a uti  Madison Andrea is a 61 y o  who presents here today with his spouse with reports of a general decline over the last few days  He was released from rehab a few weeks ago and was doing okay and now at this point has been progressively decompensating  Reports generalized weakness  Reports foul-smelling odor from his midline abdominal wound  Reports feeling short of breath as well  Patient has a history of metastatic colonic adenocarcinoma, recently discharged from prolonged surgical admission culminating in partial colectomy with diverting ileostomy c/b intra-abdominal abscesswho presented to [address] [place] [address] on 11/7 for an elective surgical procedure due to metastatic colon adenocarcinoma by Dr Eyal Farrar  Patient presented to hem/onc on 2/22 when CT of abdomen revealed transverse colonic apple core lesion and innumerable hepatic metastases  MRI revealed multiple hepatic metastasis with smaller lesions in left lobe with larger lesions on the right lobe  On 11/7, patient underwent exploratory laparotomy, segment 3 liver resection, ablation, intraoperative ultrasound of liver  This was complicated by a left upper quadrant hematoma, imaging showed suspected leak from transverse colon anastomosis  On 11/16 went to OR revealed left upper quadrant infected hematoma, defect in the transverse colon anastomosis with extravasation of succus  Massively dilated cecum requiring resection  He had a right hemicolectomy, left in discontinuity and temporary abdominal closure device was placed   On 11/17, he underwent re-exploration, partial descending colectomy, primary colonic anastomosis created with diverting loop ileostomy and midline wound VAC placement  He completed a prolonged course of IV antibiotics for ESBL bacteremia due to intra-abdominal abscess  On 12/3, patient was discharged to SNF for rehab  Patient was transferred back to hospital on 12/4 due to abdominal pain  Abdominal incision was with noted yellow drainage  CT chest/abdomen/pelvis showed abdominal abscess and distended gallbladder  ID was consulted, and patient was continued on IV antibiotics (plan for 7 day course through 12/15)  Patient was taken to IR and underwent percutaneous cholecystostomy tube insertion and hepatectomy abscess drainage  Prior to Admission Medications   Prescriptions Last Dose Informant Patient Reported? Taking? Cholecalciferol (VITAMIN D3 PO)   Yes No   Sig: Take 400 Units by mouth daily   Continuous Blood Gluc Sensor (FreeStyle Parrish 14 Day Sensor) MISC   No No   Sig: Use 1 each every 14 (fourteen) days   DULoxetine (CYMBALTA) 30 mg delayed release capsule   No No   Sig: Take 1 capsule (30 mg total) by mouth daily Do not start before January 17, 2023  Insulin Glargine Solostar (Lantus SoloStar) 100 UNIT/ML SOPN   No No   Sig: Inject 0 1 mL (10 Units total) under the skin daily at bedtime   Patient taking differently: Inject 8 Units under the skin daily at bedtime   Insulin Pen Needle (B-D UF III MINI PEN NEEDLES) 31G X 5 MM MISC   No No   Sig: Inject under the skin daily   Insulin Pen Needle (BD Pen Needle Jessica 2nd Gen) 32G X 4 MM MISC   No No   Sig: For use with insulin pen  Pharmacy may dispense brand covered by insurance  Insulin Pen Needle (BD Pen Needle Jessica 2nd Gen) 32G X 4 MM MISC   No No   Sig: For use with insulin pen  Pharmacy may dispense brand covered by insurance     Multiple Vitamins-Minerals (multivitamin with minerals) tablet   Yes No   Sig: Take 1 tablet by mouth daily   Needle, Disp, (HYPODERMIC NEEDLE) 23G X 1-1/2" MISC   No No   Sig: by Does not apply route once a week   Ostomy Supplies MISC   No No   Sig: Use in the morning acetaminophen (TYLENOL) 325 mg tablet   No No   Sig: Take 1 tablet (325 mg total) by mouth every 4 (four) hours as needed for mild pain   amLODIPine (NORVASC) 2 5 mg tablet   No No   Sig: Take 1 tablet (2 5 mg total) by mouth daily   amoxicillin (AMOXIL) 500 MG tablet   No No   Sig: Take 1 tablet (500 mg total) by mouth 2 (two) times a day for 7 days   ampicillin (PRINCIPEN) 500 mg capsule   No No   Sig: Take 1 capsule (500 mg total) by mouth 4 (four) times a day for 7 days   aspirin 81 MG tablet   Yes No   Sig: Take 81 mg by mouth daily  atorvastatin (LIPITOR) 40 mg tablet   No No   Sig: Take 1 tablet (40 mg total) by mouth daily   collagenase (SANTYL) ointment   No No   Sig: Apply topically daily To the abdominal incision area   insulin lispro (HumaLOG KwikPen) 100 units/mL injection pen   No No   Sig: Inject 4 Units under the skin 3 (three) times a day with meals   methocarbamol (ROBAXIN) 500 mg tablet   No No   Sig: Take 1 tablet (500 mg total) by mouth 2 (two) times a day   naloxone (NARCAN) 4 mg/0 1 mL nasal spray   No No   Sig: Administer 1 spray into a nostril  If no response after 2-3 minutes, give another dose in the other nostril using a new spray     ondansetron (ZOFRAN-ODT) 4 mg disintegrating tablet   No No   Sig: Take 1 tablet (4 mg total) by mouth every 6 (six) hours as needed (nausea,vomiting)   oxyCODONE (ROXICODONE) 10 MG TABS   No No   Sig: Take 1 tablet (10 mg total) by mouth every 6 (six) hours as needed for severe pain Max Daily Amount: 40 mg   pantoprazole (PROTONIX) 40 mg tablet   No No   Sig: Take 1 tablet (40 mg total) by mouth 2 (two) times a day   simethicone (MYLICON) 80 mg chewable tablet   No No   Sig: Chew 1 tablet (80 mg total) 4 (four) times a day (with meals and at bedtime)   sodium chloride, PF, 0 9 %   No No   Sig: 10 mL by Intracatheter route daily   sulfamethoxazole-trimethoprim (BACTRIM DS) 800-160 mg per tablet   No No   Sig: Take 1 tablet by mouth every 12 (twelve) hours for 10 days   tamsulosin (FLOMAX) 0 4 mg   No No   Sig: Take 1 capsule (0 4 mg total) by mouth daily with dinner      Facility-Administered Medications: None       Past Medical History:   Diagnosis Date   • Abdominal pain 03/12/2022   • Acute renal failure ([place] [address] )     [id number] resolved   • Acute respiratory failure with hypoxia ([place] [address] ) 11/12/2022   • Bacteremia 11/12/2022   • Cancer ([place] [address] )    • Diabetes mellitus ([place] [address] )    • Enteritis 08/23/2016   • Flash pulmonary edema ([place] [address] ) 11/12/2022   • Gastroparesis due to DM ([place] [address] ) 08/23/2016   • GERD (gastroesophageal reflux disease)    • Hernia, ventral 08/04/2016   • Hyperlipidemia    • Hypertension    • Morbid obesity ([place] [address] ) 04/17/2018   • Postoperative visit 03/02/2022   • SIRS (systemic inflammatory response syndrome) ([place] [address] ) 03/12/2022   • Snoring    • Stage 3a chronic kidney disease ([place] [address] ) 02/19/2022       Past Surgical History:   Procedure Laterality Date   • COLONOSCOPY     • COLOSTOMY N/A 02/20/2022    Procedure: COLOSTOMY LOOP, diverting;  Surgeon: Gregoria Rowell MD;  [facility];  Service: General   • ESOPHAGOGASTRODUODENOSCOPY N/A 08/24/2016    Procedure: ESOPHAGOGASTRODUODENOSCOPY (EGD); Surgeon: Marci Maldonado MD;  [facility];   Service:    • EXPLORATORY LAPAROTOMY W/ BOWEL RESECTION N/A 11/16/2022    Procedure: LAPAROTOMY EXPLORATORY W/ BOWEL RESECTION- VAC PLACEMENT;  Surgeon: Estuardo Rodriguez MD;  [facility];  Service: Surgical Oncology   • EXPLORATORY LAPAROTOMY W/ BOWEL RESECTION N/A 11/17/2022    Procedure: LAPAROTOMY EXPLORATORY W/ BOWEL RESECTION;  Surgeon: Estuardo Rodriguez MD;  [facility];  Service: Surgical Oncology   • ILEOSTOMY N/A 11/17/2022    Procedure: [facility];  Surgeon: Estuardo Rodriguez MD;  [facility];  Service: Surgical Oncology   • ILEOSTOMY CLOSURE N/A 11/7/2022    Procedure: REVERSAL COLOSTOMY;  Surgeon: Taylor Cerna MD;  [facility];  Service: Surgical Oncology   • IR CHOLECYSTOSTOMY TUBE CHECK/CHANGE/REPOSITION/REINSERTION/UPSIZE  12/12/2022   • IR CHOLECYSTOSTOMY TUBE PLACEMENT  12/8/2022   • IR DRAINAGE TUBE CHECK AND/OR REMOVAL  1/3/2023   • IR DRAINAGE TUBE CHECK/CHANGE/REPOSITION/REINSERTION/UPSIZE  1/12/2023   • IR DRAINAGE TUBE CHECK/CHANGE/REPOSITION/REINSERTION/UPSIZE  1/31/2023   • IR DRAINAGE TUBE PLACEMENT  12/8/2022   • IR DRAINAGE TUBE PLACEMENT  12/20/2022   • IR PORT PLACEMENT  03/10/2022   • IR THORACENTESIS  1/12/2023   • KIDNEY STONE SURGERY     • LIVER BIOPSY LAPAROSCOPIC N/A 02/20/2022    Procedure: DIAGNOSTIC LAPAROSCOPIC LIVER BIOPSY, DIVERTING LOOP COLOSTOMY ;  Surgeon: Brandy Tam MD;  [facility];  Service: General   • LIVER LOBECTOMY N/A 11/7/2022    Procedure: SEGMENT 3 LIVER RESECTION, ABLATION, INTRAOPERATIVE U/S OF LIVER, PERITONEAL BIOPSY;  Surgeon: Soren Brooke MD;  [facility];  Service: Surgical Oncology   • RIGHT COLON RESECTION N/A 11/7/2022    Procedure: EX LAP, TRANVERSE COLON RESECTION;  Surgeon: Soren Brooke MD;  [facility];  Service: Surgical Oncology   • TONSILLECTOMY     • UMBILICAL HERNIA REPAIR LAPAROSCOPIC N/A 04/26/2019    Procedure: LAPAROSCOPIC UMBILICAL HERNIA REPAIR;  Surgeon: Brandy Tam MD;  [facility];  Service: General   • VAC DRESSING APPLICATION N/A 73/45/2623    Procedure: APPLICATION VAC DRESSING ABDOMEN/TRUNK;  Surgeon: Danni Thomason MD;  [facility];  Service: Surgical Oncology       Family History   Problem Relation Age of Onset   • Diabetes Mother         mellitus   • Lung cancer Mother    • Coronary artery disease Father      I have reviewed and agree with the history as documented      E-Cigarette/Vaping   • E-Cigarette Use Never User      E-Cigarette/Vaping Substances   • Nicotine No    • THC No    • CBD No    • Flavoring No    • Other No    • Unknown No      Social History     Tobacco Use   • Smoking status: Never   • Smokeless tobacco: Never   Vaping Use   • Vaping Use: Never used   Substance Use Topics   • Alcohol use: Never   • Drug use: No       Review of Systems   Constitutional: Positive for chills and fatigue  Negative for fever  HENT: Negative for ear pain and sore throat  Eyes: Negative for pain and visual disturbance  Respiratory: Negative for cough and shortness of breath  Cardiovascular: Negative for chest pain and palpitations  Gastrointestinal: Negative for abdominal pain and vomiting  Genitourinary: Negative for dysuria and hematuria  Musculoskeletal: Negative for arthralgias and back pain  Skin: Positive for wound  Negative for color change and rash  Neurological: Positive for weakness  Negative for seizures and syncope  All other systems reviewed and are negative  Physical Exam  Physical Exam  Vitals and nursing note reviewed  Constitutional:       General: He is not in acute distress  Appearance: He is well-developed  HENT:      Head: Normocephalic and atraumatic  Eyes:      General:         Right eye: No discharge  Left eye: No discharge  Conjunctiva/sclera: Conjunctivae normal    Cardiovascular:      Rate and Rhythm: Normal rate  Pulmonary:      Effort: Pulmonary effort is normal  No respiratory distress  Abdominal:      General: There is no distension  Tenderness: There is no guarding  Comments: Midline abdominal wound with purulent drainage noted and foul odor   Musculoskeletal:         General: No deformity  Cervical back: Normal range of motion and neck supple  Skin:     General: Skin is warm and dry  Comments: There is a large wound over the anterior abdominal wall which was foul-smelling and purulent   Neurological:      Mental Status: He is alert and oriented to person, place, and time        Coordination: Coordination normal          Vital Signs  ED Triage Vitals   Temperature Pulse Respirations Blood Pressure SpO2   02/07/23 1415 02/07/23 1415 02/07/23 1415 02/07/23 1415 02/07/23 1415   98 9 °F (37 2 °C) (!) 122 20 111/69 100 %      Temp Source Heart Rate Source Patient Position - Orthostatic VS BP Location FiO2 (%)   02/07/23 1415 02/07/23 1630 02/07/23 1543 02/07/23 1630 --   Tympanic Monitor Sitting Left arm       Pain Score       --                  Vitals:    02/07/23 1800 02/07/23 1830 02/07/23 1856 02/07/23 2015   BP: 144/86 144/84 144/81 123/70   Pulse: 94 99 102 (!) 116   Patient Position - Orthostatic VS: Sitting Lying Lying Lying         Visual Acuity      ED Medications  Medications   multi-electrolyte (PLASMALYTE-A/ISOLYTE-S PH 7 4) IV solution (75 mL/hr Intravenous New Bag 2/7/23 2011)   amLODIPine (NORVASC) tablet 2 5 mg (has no administration in time range)   aspirin chewable tablet 81 mg (has no administration in time range)   atorvastatin (LIPITOR) tablet 40 mg (has no administration in time range)   DULoxetine (CYMBALTA) delayed release capsule 30 mg (has no administration in time range)   insulin glargine (LANTUS) subcutaneous injection 8 Units 0 08 mL (8 Units Subcutaneous Given 2/7/23 2119)   insulin lispro (HumaLOG) 100 units/mL subcutaneous injection 4 Units (has no administration in time range)   methocarbamol (ROBAXIN) tablet 500 mg (500 mg Oral Given 2/7/23 2118)   oxyCODONE (ROXICODONE) immediate release tablet 10 mg (10 mg Oral Given 2/7/23 2011)   oxyCODONE (ROXICODONE) IR tablet 5 mg (has no administration in time range)   pantoprazole (PROTONIX) EC tablet 40 mg (40 mg Oral Given 2/7/23 2011)   simethicone (MYLICON) chewable tablet 80 mg (80 mg Oral Given 2/7/23 2118)   tamsulosin (FLOMAX) capsule 0 4 mg (has no administration in time range)   piperacillin-tazobactam (ZOSYN) 2 25 g in sodium chloride 0 9 % 50 mL IVPB (has no administration in time range)   sodium chloride (PF) 0 9 % injection 3 mL (3 mL Intravenous Given 2/7/23 1530)   multi-electrolyte (PLASMALYTE-A/ISOLYTE-S PH 7 4) IV solution 1,000 mL (0 mL Intravenous Stopped 2/7/23 2471) Followed by   multi-electrolyte (PLASMALYTE-A/ISOLYTE-S PH 7 4) IV solution 1,000 mL (0 mL Intravenous Stopped 2/7/23 1753)     Followed by   multi-electrolyte (PLASMALYTE-A/ISOLYTE-S PH 7 4) IV solution 1,000 mL (0 mL Intravenous Stopped 2/7/23 1855)   ondansetron (ZOFRAN) injection 4 mg (4 mg Intravenous Given 2/7/23 1541)   piperacillin-tazobactam (ZOSYN) 3 375 g in sodium chloride 0 9 % 100 mL IVPB (0 g Intravenous Stopped 2/7/23 1753)   promethazine (PHENERGAN) injection 25 mg (25 mg Intramuscular Given 2/7/23 2117)       Diagnostic Studies  Results Reviewed     Procedure Component Value Units Date/Time    Blood culture #1 [[id number]] Collected: 02/07/23 1530    Lab Status: Preliminary result Specimen: Blood from Arm, Left Updated: 02/07/23 2101     Blood Culture Received in Microbiology Lab  Culture in Progress  Blood culture #2 [[id number]] Collected: 02/07/23 1530    Lab Status: Preliminary result Specimen: Blood from Hand, Right Updated: 02/07/23 2101     Blood Culture Received in Microbiology Lab  Culture in Progress      Basic metabolic panel [[id number]]  (Abnormal) Collected: 02/07/23 2014    Lab Status: Final result Specimen: Blood from Arm, Right Updated: 02/07/23 2033     Sodium 132 mmol/L      Potassium 5 1 mmol/L      Chloride 101 mmol/L      CO2 16 mmol/L      ANION GAP 15 mmol/L      BUN 60 mg/dL      Creatinine 2 20 mg/dL      Glucose 222 mg/dL      Calcium 8 8 mg/dL      eGFR 31 ml/min/1 73sq m     Narrative:      [place] guidelines for Chronic Kidney Disease (CKD):   •  Stage 1 with normal or high GFR (GFR > 90 mL/min/1 73 square meters)  •  Stage 2 Mild CKD (GFR = 60-89 mL/min/1 73 square meters)  •  Stage 3A Moderate CKD (GFR = 45-59 mL/min/1 73 square meters)  •  Stage 3B Moderate CKD (GFR = 30-44 mL/min/1 73 square meters)  •  Stage 4 Severe CKD (GFR = 15-29 mL/min/1 73 square meters)  •  Stage 5 End Stage CKD (GFR <15 mL/min/1 73 square meters)  Note: GFR calculation is accurate only with a steady state creatinine    Urine Microscopic [[id number]]  (Abnormal) Collected: 02/07/23 1805    Lab Status: Final result Specimen: Urine, Clean Catch Updated: 02/07/23 1839     RBC, UA 1-2 /hpf      WBC, UA 4-10 /hpf      Epithelial Cells None Seen /hpf      Bacteria, UA None Seen /hpf     Lactic acid 2 Hours [[id number]]  (Normal) Collected: 02/07/23 1759    Lab Status: Final result Specimen: Blood from Arm, Left Updated: 02/07/23 1829     LACTIC ACID 1 2 mmol/L     Narrative:      Result may be elevated if tourniquet was used during collection  UA w Reflex to Microscopic w Reflex to Culture [[id number]]  (Abnormal) Collected: 02/07/23 1805    Lab Status: Final result Specimen: Urine, Clean Catch Updated: 02/07/23 1814     Color, UA Light Yellow     Clarity, UA Clear     Specific Gravity, UA 1 012     pH, UA 5 0     Leukocytes, UA Negative     Nitrite, UA Negative     Protein, UA 30 (1+) mg/dl      Glucose, UA Negative mg/dl      Ketones, UA Negative mg/dl      Urobilinogen, UA <2 0 mg/dl      Bilirubin, UA Negative     Occult Blood, UA Negative    Wound culture and Gram stain [[id number]] Collected: 02/07/23 1759    Lab Status:  In process Specimen: Wound from Abdominal Updated: 02/07/23 1802    Manual Differential(PHLEBS Do Not Order) [[id number]]  (Abnormal) Collected: 02/07/23 1530    Lab Status: Final result Specimen: Blood from Arm, Left Updated: 02/07/23 1727     Total Counted --     Target Cells Present     Platelet Estimate Increased    FLU/RSV/COVID - if FLU/RSV clinically relevant [[id number]]  (Normal) Collected: 02/07/23 1530    Lab Status: Final result Specimen: Nares from Nose Updated: 02/07/23 1619     SARS-CoV-2 Negative     INFLUENZA A PCR Negative     INFLUENZA B PCR Negative     RSV PCR Negative    Narrative:      FOR PEDIATRIC PATIENTS - copy/paste COVID Guidelines URL to browser: [email or web address]    SARS-CoV-2 assay is a Nucleic Acid Amplification assay intended for the  qualitative detection of nucleic acid from SARS-CoV-2 in nasopharyngeal  swabs  Results are for the presumptive identification of SARS-CoV-2 RNA  Positive results are indicative of infection with SARS-CoV-2, the virus  causing COVID-19, but do not rule out bacterial infection or co-infection  with other viruses  Laboratories within the [place] and its  territories are required to report all positive results to the appropriate  public health authorities  Negative results do not preclude SARS-CoV-2  infection and should not be used as the sole basis for treatment or other  patient management decisions  Negative results must be combined with  clinical observations, patient history, and epidemiological information  This test has not been FDA cleared or approved  This test has been authorized by FDA under an Emergency Use Authorization  (EUA)  This test is only authorized for the duration of time the  declaration that circumstances exist justifying the authorization of the  emergency use of an in vitro diagnostic tests for detection of SARS-CoV-2  virus and/or diagnosis of COVID-19 infection under section 564(b)(1) of  the Act, 21 U  S C  602WCZ-0(O)(1), unless the authorization is terminated  or revoked sooner  The test has been validated but independent review by FDA  and CLIA is pending  Test performed using [employer] GeneXpert: This RT-PCR assay targets N2,  a region unique to SARS-CoV-2  A conserved region in the E-gene was chosen  for pan-Sarbecovirus detection which includes SARS-CoV-2  According to CMS-2020-01-R, this platform meets the definition of high-throughput technology      Comprehensive metabolic panel [[id number]]  (Abnormal) Collected: 02/07/23 1530    Lab Status: Final result Specimen: Blood from Arm, Left Updated: 02/07/23 1613     Sodium 129 mmol/L      Potassium 5 9 mmol/L      Chloride 98 mmol/L      CO2 15 mmol/L      ANION GAP 16 mmol/L      BUN 69 mg/dL      Creatinine 2 52 mg/dL      Glucose 298 mg/dL      Calcium 9 4 mg/dL      Corrected Calcium 11 2 mg/dL      AST 62 U/L      ALT 26 U/L      Alkaline Phosphatase 1,300 U/L      Total Protein 9 8 g/dL      Albumin 1 7 g/dL      Total Bilirubin 0 84 mg/dL      eGFR 27 ml/min/1 73sq m     Narrative:      [place] guidelines for Chronic Kidney Disease (CKD):   •  Stage 1 with normal or high GFR (GFR > 90 mL/min/1 73 square meters)  •  Stage 2 Mild CKD (GFR = 60-89 mL/min/1 73 square meters)  •  Stage 3A Moderate CKD (GFR = 45-59 mL/min/1 73 square meters)  •  Stage 3B Moderate CKD (GFR = 30-44 mL/min/1 73 square meters)  •  Stage 4 Severe CKD (GFR = 15-29 mL/min/1 73 square meters)  •  Stage 5 End Stage CKD (GFR <15 mL/min/1 73 square meters)  Note: GFR calculation is accurate only with a steady state creatinine    Lactic acid [[id number]]  (Abnormal) Collected: 02/07/23 1530    Lab Status: Final result Specimen: Blood from Arm, Left Updated: 02/07/23 1611     LACTIC ACID 2 4 mmol/L     Narrative:      Result may be elevated if tourniquet was used during collection      Procalcitonin [[id number]]  (Abnormal) Collected: 02/07/23 1530    Lab Status: Final result Specimen: Blood from Arm, Left Updated: 02/07/23 1604     Procalcitonin 1 07 ng/ml     Protime-INR [[id number]]  (Abnormal) Collected: 02/07/23 1530    Lab Status: Final result Specimen: Blood from Arm, Left Updated: 02/07/23 1553     Protime 16 1 seconds      INR 1 32    CBC and differential [[id number]]  (Abnormal) Collected: 02/07/23 1530    Lab Status: Final result Specimen: Blood from Arm, Left Updated: 02/07/23 1541     WBC 28 00 Thousand/uL      RBC 3 77 Million/uL      Hemoglobin 9 8 g/dL      Hematocrit 32 2 %      MCV 85 fL      MCH 26 0 pg      MCHC 30 4 g/dL      RDW 17 9 %      MPV 9 2 fL      Platelets 138 Thousands/uL     Narrative: This is an appended report  These results have been appended to a previously verified report  CT chest abdomen pelvis wo contrast   Final Result by Vineet Wilcox MD (02/07 1845)         1  Interval decrease in bilateral pleural effusions with development of multiple small bilateral lung nodules concerning for metastatic lung disease  2   Fluid collection at the level of the left lobe of the liver is smaller with resolution of the other perisplenic fluid collections and improvement in collections in the left retroperitoneum inferior to the spleen  The study was marked in [facility] for immediate notification  Workstation performed: [id number]                    Procedures  Procedures         ED Course                                             Medical Decision Making  Patient with elevated white blood cell count elevated lactic acid  Will need to bring him for antibiotic therapy  Additionally acute kidney injury    Acute kidney injury [facility]): acute illness or injury  Wound infection: acute illness or injury  Amount and/or Complexity of Data Reviewed  Labs: ordered  Radiology: ordered  Risk  Prescription drug management  Decision regarding hospitalization  Disposition  Final diagnoses:   Wound infection - Abdominal wall   Acute kidney injury [facility])     Time reflects when diagnosis was documented in both MDM as applicable and the Disposition within this note     Time User Action Codes Description Comment    2/7/2023  7:56 PM Maya Herrera  8XXA,  L08 9] Wound infection     2/7/2023  7:57 PM Eduardo Lopez 18  8XXA,  L08 9] Wound infection Abdominal wall    2/7/2023  7:57 PM Douglas Bae Add [N17 9] Acute kidney injury ([facility] [address] )     2/7/2023  8:39 PM Sharyn Fernández Add [A41 9] Sepsis, due to unspecified organism, unspecified whether acute organ dysfunction present [facility])       ED Disposition     ED Disposition   Admit Condition   Stable    Date/Time   Tue Feb 7, 2023  7:58 PM    Comment   Case was discussed with Trent Casarez and the patient's admission status was agreed to be Admission Status: inpatient status to the service of Dr Chani Diaz   Follow-up Information    None         Patient's Medications   Discharge Prescriptions    No medications on file       No discharge procedures on file      PDMP Review       Value Time User    PDMP Reviewed  Yes 2/7/2023  7:44 PM Emiliana Treviño PA-C          ED Provider  Electronically Signed by           Meagan December, [address]  02/07/23 212

## 2023-02-07 NOTE — TELEPHONE ENCOUNTER
ABELARDO :  HARVINDERM that in a half hour he is going to the ED    Having a hard time breathing, some of his stitches look like they are coming out, new meds are making him vomit

## 2023-02-07 NOTE — PROGRESS NOTES
Message received from pt asking for information, called him back this morning  He explained that he is looking for the [facility] number and feels that he needs to apply for MA, as he feels that his insurance right now is not enough coverage  He asked that I text the number to him and says that he has been struggling with nausea and vomiting while taking oral antibiotics, he feels SOB and may end up back at the ED today  He has reached out to his physician and is waiting on a return call  MSW did text him the [facility] phone number and also a link to apply for MA benefits online  I did share with him too that if he is denied for MA I am happy to send him the hospital financial assist application as well  He agreed to keep me posted  I will check in with him after his Med Onc appt later this month, he has my direct number to reach out as needed in the interim

## 2023-02-08 ENCOUNTER — APPOINTMENT (INPATIENT)
Dept: ULTRASOUND IMAGING | Facility: HOSPITAL | Age: 58
End: 2023-02-08

## 2023-02-08 LAB
ALBUMIN SERPL BCP-MCNC: 1.3 G/DL (ref 3.5–5)
ALP SERPL-CCNC: 1043 U/L (ref 46–116)
ALT SERPL W P-5'-P-CCNC: 20 U/L (ref 12–78)
ANION GAP SERPL CALCULATED.3IONS-SCNC: 11 MMOL/L (ref 4–13)
AST SERPL W P-5'-P-CCNC: 61 U/L (ref 5–45)
BASOPHILS # BLD AUTO: 0.06 THOUSANDS/ÂΜL (ref 0–0.1)
BASOPHILS NFR BLD AUTO: 0 % (ref 0–1)
BILIRUB SERPL-MCNC: 0.75 MG/DL (ref 0.2–1)
BUN SERPL-MCNC: 53 MG/DL (ref 5–25)
CALCIUM ALBUM COR SERPL-MCNC: 10.8 MG/DL (ref 8.3–10.1)
CALCIUM SERPL-MCNC: 8.6 MG/DL (ref 8.3–10.1)
CHLORIDE SERPL-SCNC: 102 MMOL/L (ref 96–108)
CO2 SERPL-SCNC: 21 MMOL/L (ref 21–32)
CREAT SERPL-MCNC: 1.93 MG/DL (ref 0.6–1.3)
EOSINOPHIL # BLD AUTO: 0 THOUSAND/ÂΜL (ref 0–0.61)
EOSINOPHIL NFR BLD AUTO: 0 % (ref 0–6)
ERYTHROCYTE [DISTWIDTH] IN BLOOD BY AUTOMATED COUNT: 17.9 % (ref 11.6–15.1)
GFR SERPL CREATININE-BSD FRML MDRD: 37 ML/MIN/1.73SQ M
GLUCOSE SERPL-MCNC: 123 MG/DL (ref 65–140)
GLUCOSE SERPL-MCNC: 124 MG/DL (ref 65–140)
GLUCOSE SERPL-MCNC: 147 MG/DL (ref 65–140)
GLUCOSE SERPL-MCNC: 159 MG/DL (ref 65–140)
GLUCOSE SERPL-MCNC: 162 MG/DL (ref 65–140)
HCT VFR BLD AUTO: 25.7 % (ref 36.5–49.3)
HGB BLD-MCNC: 8 G/DL (ref 12–17)
IMM GRANULOCYTES # BLD AUTO: 0.27 THOUSAND/UL (ref 0–0.2)
IMM GRANULOCYTES NFR BLD AUTO: 1 % (ref 0–2)
LYMPHOCYTES # BLD AUTO: 1.86 THOUSANDS/ÂΜL (ref 0.6–4.47)
LYMPHOCYTES NFR BLD AUTO: 9 % (ref 14–44)
MCH RBC QN AUTO: 25.8 PG (ref 26.8–34.3)
MCHC RBC AUTO-ENTMCNC: 31.1 G/DL (ref 31.4–37.4)
MCV RBC AUTO: 83 FL (ref 82–98)
MONOCYTES # BLD AUTO: 1.61 THOUSAND/ÂΜL (ref 0.17–1.22)
MONOCYTES NFR BLD AUTO: 7 % (ref 4–12)
NEUTROPHILS # BLD AUTO: 17.95 THOUSANDS/ÂΜL (ref 1.85–7.62)
NEUTS SEG NFR BLD AUTO: 83 % (ref 43–75)
NRBC BLD AUTO-RTO: 0 /100 WBCS
PLATELET # BLD AUTO: 448 THOUSANDS/UL (ref 149–390)
PMV BLD AUTO: 9.2 FL (ref 8.9–12.7)
POTASSIUM SERPL-SCNC: 4.8 MMOL/L (ref 3.5–5.3)
PROCALCITONIN SERPL-MCNC: 1.37 NG/ML
PROT SERPL-MCNC: 7.8 G/DL (ref 6.4–8.4)
RBC # BLD AUTO: 3.1 MILLION/UL (ref 3.88–5.62)
SODIUM SERPL-SCNC: 134 MMOL/L (ref 135–147)
WBC # BLD AUTO: 21.75 THOUSAND/UL (ref 4.31–10.16)

## 2023-02-08 PROCEDURE — 0J9830Z DRAINAGE OF ABDOMEN SUBCUTANEOUS TISSUE AND FASCIA WITH DRAINAGE DEVICE, PERCUTANEOUS APPROACH: ICD-10-PCS | Performed by: SURGERY

## 2023-02-08 RX ORDER — OXYCODONE HYDROCHLORIDE 5 MG/1
5 TABLET ORAL EVERY 4 HOURS PRN
Status: DISCONTINUED | OUTPATIENT
Start: 2023-02-08 | End: 2023-02-24 | Stop reason: HOSPADM

## 2023-02-08 RX ORDER — HYDROMORPHONE HCL IN WATER/PF 6 MG/30 ML
0.2 PATIENT CONTROLLED ANALGESIA SYRINGE INTRAVENOUS EVERY 4 HOURS PRN
Status: DISCONTINUED | OUTPATIENT
Start: 2023-02-08 | End: 2023-02-22

## 2023-02-08 RX ORDER — ONDANSETRON 2 MG/ML
4 INJECTION INTRAMUSCULAR; INTRAVENOUS EVERY 4 HOURS PRN
Status: DISCONTINUED | OUTPATIENT
Start: 2023-02-08 | End: 2023-02-24 | Stop reason: HOSPADM

## 2023-02-08 RX ORDER — ACETAMINOPHEN 325 MG/1
650 TABLET ORAL EVERY 4 HOURS PRN
Status: DISCONTINUED | OUTPATIENT
Start: 2023-02-08 | End: 2023-02-10

## 2023-02-08 RX ORDER — LANOLIN ALCOHOL/MO/W.PET/CERES
3 CREAM (GRAM) TOPICAL ONCE
Status: COMPLETED | OUTPATIENT
Start: 2023-02-08 | End: 2023-02-08

## 2023-02-08 RX ADMIN — PIPERACILLIN AND TAZOBACTAM 2.25 G: 2; .25 INJECTION, POWDER, LYOPHILIZED, FOR SOLUTION INTRAVENOUS at 05:49

## 2023-02-08 RX ADMIN — AMLODIPINE BESYLATE 2.5 MG: 2.5 TABLET ORAL at 09:14

## 2023-02-08 RX ADMIN — SIMETHICONE 80 MG: 80 TABLET, CHEWABLE ORAL at 22:47

## 2023-02-08 RX ADMIN — SODIUM CHLORIDE, SODIUM GLUCONATE, SODIUM ACETATE, POTASSIUM CHLORIDE, MAGNESIUM CHLORIDE, SODIUM PHOSPHATE, DIBASIC, AND POTASSIUM PHOSPHATE 75 ML/HR: .53; .5; .37; .037; .03; .012; .00082 INJECTION, SOLUTION INTRAVENOUS at 23:49

## 2023-02-08 RX ADMIN — HEPARIN SODIUM 5000 UNITS: 5000 INJECTION INTRAVENOUS; SUBCUTANEOUS at 05:49

## 2023-02-08 RX ADMIN — INSULIN LISPRO 1 UNITS: 100 INJECTION, SOLUTION INTRAVENOUS; SUBCUTANEOUS at 22:47

## 2023-02-08 RX ADMIN — ASPIRIN 81 MG 81 MG: 81 TABLET ORAL at 09:14

## 2023-02-08 RX ADMIN — PANTOPRAZOLE SODIUM 40 MG: 40 TABLET, DELAYED RELEASE ORAL at 17:17

## 2023-02-08 RX ADMIN — OXYCODONE HYDROCHLORIDE 5 MG: 5 TABLET ORAL at 20:09

## 2023-02-08 RX ADMIN — SIMETHICONE 80 MG: 80 TABLET, CHEWABLE ORAL at 17:17

## 2023-02-08 RX ADMIN — PANTOPRAZOLE SODIUM 40 MG: 40 TABLET, DELAYED RELEASE ORAL at 09:14

## 2023-02-08 RX ADMIN — ATORVASTATIN CALCIUM 40 MG: 40 TABLET, FILM COATED ORAL at 09:14

## 2023-02-08 RX ADMIN — HEPARIN SODIUM 5000 UNITS: 5000 INJECTION INTRAVENOUS; SUBCUTANEOUS at 22:47

## 2023-02-08 RX ADMIN — TAMSULOSIN HYDROCHLORIDE 0.4 MG: 0.4 CAPSULE ORAL at 17:17

## 2023-02-08 RX ADMIN — Medication 3 MG: at 22:48

## 2023-02-08 RX ADMIN — INSULIN GLARGINE 5 UNITS: 100 INJECTION, SOLUTION SUBCUTANEOUS at 22:47

## 2023-02-08 RX ADMIN — ONDANSETRON 4 MG: 2 INJECTION INTRAMUSCULAR; INTRAVENOUS at 09:22

## 2023-02-08 RX ADMIN — SIMETHICONE 80 MG: 80 TABLET, CHEWABLE ORAL at 09:21

## 2023-02-08 RX ADMIN — INSULIN LISPRO 4 UNITS: 100 INJECTION, SOLUTION INTRAVENOUS; SUBCUTANEOUS at 17:17

## 2023-02-08 RX ADMIN — DULOXETINE HYDROCHLORIDE 30 MG: 30 CAPSULE, DELAYED RELEASE ORAL at 09:14

## 2023-02-08 RX ADMIN — ONDANSETRON 4 MG: 2 INJECTION INTRAMUSCULAR; INTRAVENOUS at 17:35

## 2023-02-08 RX ADMIN — MEROPENEM 1000 MG: 1 INJECTION, POWDER, FOR SOLUTION INTRAVENOUS at 22:47

## 2023-02-08 RX ADMIN — MEROPENEM 1000 MG: 1 INJECTION, POWDER, FOR SOLUTION INTRAVENOUS at 11:18

## 2023-02-08 RX ADMIN — INSULIN LISPRO 1 UNITS: 100 INJECTION, SOLUTION INTRAVENOUS; SUBCUTANEOUS at 17:16

## 2023-02-08 RX ADMIN — OXYCODONE HYDROCHLORIDE 5 MG: 5 TABLET ORAL at 09:22

## 2023-02-08 NOTE — PLAN OF CARE
Problem: PAIN - ADULT  Goal: Verbalizes/displays adequate comfort level or baseline comfort level  Description: Interventions:  - Encourage patient to monitor pain and request assistance  - Assess pain using appropriate pain scale  - Administer analgesics based on type and severity of pain and evaluate response  - Implement non-pharmacological measures as appropriate and evaluate response  - Consider cultural and social influences on pain and pain management  - Notify physician/advanced practitioner if interventions unsuccessful or patient reports new pain  Outcome: Progressing     Problem: INFECTION - ADULT  Goal: Absence or prevention of progression during hospitalization  Description: INTERVENTIONS:  - Assess and monitor for signs and symptoms of infection  - Monitor lab/diagnostic results  - Monitor all insertion sites, i e  indwelling lines, tubes, and drains  - Monitor endotracheal if appropriate and nasal secretions for changes in amount and color  - [place] appropriate cooling/warming therapies per order  - Administer medications as ordered  - Instruct and encourage patient and family to use good hand hygiene technique  - Identify and instruct in appropriate isolation precautions for identified infection/condition  Outcome: Progressing  Goal: Absence of fever/infection during neutropenic period  Description: INTERVENTIONS:  - Monitor WBC    Outcome: Progressing     Problem: SAFETY ADULT  Goal: Patient will remain free of falls  Description: INTERVENTIONS:  - Educate patient/family on patient safety including physical limitations  - Instruct patient to call for assistance with activity   - Consult OT/PT to assist with strengthening/mobility   - Keep Call bell within reach  - Keep bed low and locked with side rails adjusted as appropriate  - Keep care items and personal belongings within reach  - Initiate and maintain comfort rounds  - Make Fall Risk Sign visible to staff  - Offer Toileting every  Hours, in advance of need  - Initiate/Maintain alarm  - Obtain necessary fall risk management equipment:  - Apply yellow socks and bracelet for high fall risk patients  - Consider moving patient to room near nurses station  Outcome: Progressing  Goal: Maintain or return to baseline ADL function  Description: INTERVENTIONS:  -  Assess patient's ability to carry out ADLs; assess patient's baseline for ADL function and identify physical deficits which impact ability to perform ADLs (bathing, care of mouth/teeth, toileting, grooming, dressing, etc )  - Assess/evaluate cause of self-care deficits   - Assess range of motion  - Assess patient's mobility; develop plan if impaired  - Assess patient's need for assistive devices and provide as appropriate  - Encourage maximum independence but intervene and supervise when necessary  - Involve family in performance of ADLs  - Assess for home care needs following discharge   - Consider OT consult to assist with ADL evaluation and planning for discharge  - Provide patient education as appropriate  Outcome: Progressing  Goal: Maintains/Returns to pre admission functional level  Description: INTERVENTIONS:  - Perform BMAT or MOVE assessment daily    - Set and communicate daily mobility goal to care team and patient/family/caregiver  - Collaborate with rehabilitation services on mobility goals if consulted  - Perform Range of Motion  times a day  - Reposition patient every  hours    - Dangle patient  times a day  - Stand patient  times a day  - Ambulate patient  times a day  - Out of bed to chair times a day   - Out of bed for meals  times a day  - Out of bed for toileting  - Record patient progress and toleration of activity level   Outcome: Progressing     Problem: DISCHARGE PLANNING  Goal: Discharge to home or other facility with appropriate resources  Description: INTERVENTIONS:  - Identify barriers to discharge w/patient and caregiver  - Arrange for needed discharge resources and transportation as appropriate  - Identify discharge learning needs (meds, wound care, etc )  - Arrange for interpretive services to assist at discharge as needed  - Refer to Case Management Department for coordinating discharge planning if the patient needs post-hospital services based on physician/advanced practitioner order or complex needs related to functional status, cognitive ability, or social support system  Outcome: Progressing     Problem: Knowledge Deficit  Goal: Patient/family/caregiver demonstrates understanding of disease process, treatment plan, medications, and discharge instructions  Description: Complete learning assessment and assess knowledge base    Interventions:  - Provide teaching at level of understanding  - Provide teaching via preferred learning methods  Outcome: Progressing     Problem: SKIN/TISSUE INTEGRITY - ADULT  Goal: Skin Integrity remains intact(Skin Breakdown Prevention)  Description: Assess:  -Perform Michael assessment every   -Clean and moisturize skin every   -Inspect skin when repositioning, toileting, and assisting with ADLS  -Assess under medical devices such as  every   -Assess extremities for adequate circulation and sensation     Bed Management:  -Have minimal linens on bed & keep smooth, unwrinkled  -Change linens as needed when moist or perspiring  -Avoid sitting or lying in one position for more than  hours while in bed  -Keep HOB at degrees     Toileting:  -Offer bedside commode  -Assess for incontinence every   -Use incontinent care products after each incontinent episode such as     Activity:  -Mobilize patient times a day  -Encourage activity and walks on unit  -Encourage or provide ROM exercises   -Turn and reposition patient every  Hours  -Use appropriate equipment to lift or move patient in bed  -Instruct/ Assist with weight shifting every  when out of bed in chair  -Consider limitation of chair time  hour intervals    Skin Care:  -Avoid use of baby powder, tape, friction and shearing, hot water or constrictive clothing  -Relieve pressure over bony prominences using   -Do not massage red bony areas    Next Steps:  -Teach patient strategies to minimize risks such as    -Consider consults to  interdisciplinary teams such as  Outcome: Progressing  Goal: Incision(s), wounds(s) or drain site(s) healing without S/S of infection  Description: INTERVENTIONS  - Assess and document dressing, incision, wound bed, drain sites and surrounding tissue  - Provide patient and family education  - Perform skin care/dressing changes every   Outcome: Progressing  Goal: Pressure injury heals and does not worsen  Description: Interventions:  - Implement low air loss mattress or specialty surface (Criteria met)  - Apply silicone foam dressing  - Instruct/assist with weight shifting every  minutes when in chair   - Limit chair time to  hour intervals  - Use special pressure reducing interventions such as  when in chair   - Apply fecal or urinary incontinence containment device   - Perform passive or active ROM every   - Turn and reposition patient & offload bony prominences every hours   - Utilize friction reducing device or surface for transfers   - Consider consults to  interdisciplinary teams such as   - Use incontinent care products after each incontinent episode such as   - Consider nutrition services referral as needed  Outcome: Progressing     Problem: Prexisting or High Potential for Compromised Skin Integrity  Goal: Skin integrity is maintained or improved  Description: INTERVENTIONS:  - Identify patients at risk for skin breakdown  - Assess and monitor skin integrity  - Assess and monitor nutrition and hydration status  - Monitor labs   - Assess for incontinence   - Turn and reposition patient  - Assist with mobility/ambulation  - Relieve pressure over bony prominences  - Avoid friction and shearing  - Provide appropriate hygiene as needed including keeping skin clean and dry  - Evaluate need for skin moisturizer/barrier cream  - Collaborate with interdisciplinary team   - Patient/family teaching  - Consider wound care consult   Outcome: Progressing     Problem: MOBILITY - ADULT  Goal: Maintain or return to baseline ADL function  Description: INTERVENTIONS:  -  Assess patient's ability to carry out ADLs; assess patient's baseline for ADL function and identify physical deficits which impact ability to perform ADLs (bathing, care of mouth/teeth, toileting, grooming, dressing, etc )  - Assess/evaluate cause of self-care deficits   - Assess range of motion  - Assess patient's mobility; develop plan if impaired  - Assess patient's need for assistive devices and provide as appropriate  - Encourage maximum independence but intervene and supervise when necessary  - Involve family in performance of ADLs  - Assess for home care needs following discharge   - Consider OT consult to assist with ADL evaluation and planning for discharge  - Provide patient education as appropriate  Outcome: Progressing  Goal: Maintains/Returns to pre admission functional level  Description: INTERVENTIONS:  - Perform BMAT or MOVE assessment daily    - Set and communicate daily mobility goal to care team and patient/family/caregiver  - Collaborate with rehabilitation services on mobility goals if consulted  - Perform Range of Motion  times a day  - Reposition patient every  hours    - Dangle patient  times a day  - Stand patient  times a day  - Ambulate patient  times a day  - Out of bed to chair  times a day   - Out of bed for meals times a day  - Out of bed for toileting  - Record patient progress and toleration of activity level   Outcome: Progressing

## 2023-02-08 NOTE — PLAN OF CARE
Problem: PAIN - ADULT  Goal: Verbalizes/displays adequate comfort level or baseline comfort level  Description: Interventions:  - Encourage patient to monitor pain and request assistance  - Assess pain using appropriate pain scale  - Administer analgesics based on type and severity of pain and evaluate response  - Implement non-pharmacological measures as appropriate and evaluate response  - Consider cultural and social influences on pain and pain management  - Notify physician/advanced practitioner if interventions unsuccessful or patient reports new pain  Outcome: Progressing     Problem: INFECTION - ADULT  Goal: Absence or prevention of progression during hospitalization  Description: INTERVENTIONS:  - Assess and monitor for signs and symptoms of infection  - Monitor lab/diagnostic results  - Monitor all insertion sites, i e  indwelling lines, tubes, and drains  - Monitor endotracheal if appropriate and nasal secretions for changes in amount and color  - [place] appropriate cooling/warming therapies per order  - Administer medications as ordered  - Instruct and encourage patient and family to use good hand hygiene technique  - Identify and instruct in appropriate isolation precautions for identified infection/condition  Outcome: Progressing  Goal: Absence of fever/infection during neutropenic period  Description: INTERVENTIONS:  - Monitor WBC    Outcome: Progressing     Problem: SAFETY ADULT  Goal: Patient will remain free of falls  Description: INTERVENTIONS:  - Educate patient/family on patient safety including physical limitations  - Instruct patient to call for assistance with activity   - Consult OT/PT to assist with strengthening/mobility   - Keep Call bell within reach  - Keep bed low and locked with side rails adjusted as appropriate  - Keep care items and personal belongings within reach  - Initiate and maintain comfort rounds  - Make Fall Risk Sign visible to staff  - Offer Toileting every  Hours, in advance of need  - Initiate/Maintain alarm  - Obtain necessary fall risk management equipment:   - Apply yellow socks and bracelet for high fall risk patients  - Consider moving patient to room near nurses station  Outcome: Progressing  Goal: Maintain or return to baseline ADL function  Description: INTERVENTIONS:  -  Assess patient's ability to carry out ADLs; assess patient's baseline for ADL function and identify physical deficits which impact ability to perform ADLs (bathing, care of mouth/teeth, toileting, grooming, dressing, etc )  - Assess/evaluate cause of self-care deficits   - Assess range of motion  - Assess patient's mobility; develop plan if impaired  - Assess patient's need for assistive devices and provide as appropriate  - Encourage maximum independence but intervene and supervise when necessary  - Involve family in performance of ADLs  - Assess for home care needs following discharge   - Consider OT consult to assist with ADL evaluation and planning for discharge  - Provide patient education as appropriate  Outcome: Progressing  Goal: Maintains/Returns to pre admission functional level  Description: INTERVENTIONS:  - Perform BMAT or MOVE assessment daily    - Set and communicate daily mobility goal to care team and patient/family/caregiver  - Collaborate with rehabilitation services on mobility goals if consulted  - Perform Range of Motion  times a day  - Reposition patient every  hours    - Dangle patient  times a day  - Stand patient  times a day  - Ambulate patient  times a day  - Out of bed to chair  times a day   - Out of bed for meals  times a day  - Out of bed for toileting  - Record patient progress and toleration of activity level   Outcome: Progressing     Problem: DISCHARGE PLANNING  Goal: Discharge to home or other facility with appropriate resources  Description: INTERVENTIONS:  - Identify barriers to discharge w/patient and caregiver  - Arrange for needed discharge resources and transportation as appropriate  - Identify discharge learning needs (meds, wound care, etc )  - Arrange for interpretive services to assist at discharge as needed  - Refer to Case Management Department for coordinating discharge planning if the patient needs post-hospital services based on physician/advanced practitioner order or complex needs related to functional status, cognitive ability, or social support system  Outcome: Progressing     Problem: Knowledge Deficit  Goal: Patient/family/caregiver demonstrates understanding of disease process, treatment plan, medications, and discharge instructions  Description: Complete learning assessment and assess knowledge base    Interventions:  - Provide teaching at level of understanding  - Provide teaching via preferred learning methods  Outcome: Progressing     Problem: SKIN/TISSUE INTEGRITY - ADULT  Goal: Skin Integrity remains intact(Skin Breakdown Prevention)  Description: Assess:  -Perform Michael assessment every   -Clean and moisturize skin every   -Inspect skin when repositioning, toileting, and assisting with ADLS  -Assess under medical devices such as  every  -Assess extremities for adequate circulation and sensation     Bed Management:  -Have minimal linens on bed & keep smooth, unwrinkled  -Change linens as needed when moist or perspiring  -Avoid sitting or lying in one position for more than hours while in bed  -Keep HOB at degrees     Toileting:  -Offer bedside commode  -Assess for incontinence every   -Use incontinent care products after each incontinent episode such as     Activity:  -Mobilize patient  times a day  -Encourage activity and walks on unit  -Encourage or provide ROM exercises   -Turn and reposition patient every  Hours  -Use appropriate equipment to lift or move patient in bed  -Instruct/ Assist with weight shifting every  when out of bed in chair  -Consider limitation of chair time  hour intervals    Skin Care:  -Avoid use of baby powder, tape, friction and shearing, hot water or constrictive clothing  -Relieve pressure over bony prominences using   -Do not massage red bony areas    Next Steps:  -Teach patient strategies to minimize risks such as    -Consider consults to  interdisciplinary teams such as   Outcome: Progressing  Goal: Incision(s), wounds(s) or drain site(s) healing without S/S of infection  Description: INTERVENTIONS  - Assess and document dressing, incision, wound bed, drain sites and surrounding tissue  - Provide patient and family education  - Perform skin care/dressing changes every   Outcome: Progressing  Goal: Pressure injury heals and does not worsen  Description: Interventions:  - Implement low air loss mattress or specialty surface (Criteria met)  - Apply silicone foam dressing  - Instruct/assist with weight shifting every  minutes when in chair   - Limit chair time to  hour intervals  - Use special pressure reducing interventions such as  when in chair   - Apply fecal or urinary incontinence containment device   - Perform passive or active ROM every   - Turn and reposition patient & offload bony prominences every  hours   - Utilize friction reducing device or surface for transfers   - Consider consults to  interdisciplinary teams such as   - Use incontinent care products after each incontinent episode such as   - Consider nutrition services referral as needed  Outcome: Progressing

## 2023-02-08 NOTE — PROCEDURES
Bedside Excisional debridement - General Surgery   Moiz Steinberg 61 y o  male MRN: [id number]  Unit/Bed#: -01 Encounter: [id number]      Assessment:   Midline surgical incision wound with purulent drainage and slough at base of wound  Surrounding erythema  Procedure  A time-out was performed prior to the initiation of the bedside debridement, and patient agreeable to procedure  Patient positioning: Supine in bed  Local anesthetic used (type and volume): None  Adequate analgesia in the area to be debrided was verified  1  Technique used: Sharp  2  Instruments used: 15 blade scalpel and Metzenbaum scissors  3  Description of tissue removed: Necrotic, slough tissue from base of wound  4  Appearance and size of wound: 10x3x0 5  Wound base pink with white/gray slough, necrotic tissue  5  Depth of the debridement (depth, layer exposed, appearance of underlying tissue): Subcutaneous, debrided down to wound base with minimal bleeding, base pink and healthy tissue with minimal necrotic tissue remaining  6  Dressing used (packing and external dressing): 4 x 4 wet-to-dry dressings with ABD pad on top  Patient tolerated the procedure well, and adequate hemostasis was achieved at the completion of the procedure  Upon completion of the bedside procedure, the patient was hemodynamically stable, and vitals were WNL           Jeremy Salgado PA-C  2/8/2023

## 2023-02-08 NOTE — CONSULTS
Consultation - Infectious Disease   Jessica Ferreira 61 y o  male MRN: [id number]  Unit/Bed#: -01 Encounter: [id number]      IMPRESSION & RECOMMENDATIONS:   1  Systemic inflammatory response syndrome  Leukocytosis and tachycardia  Present on admission  Possible sepsis without a well-defined source  The patient CT chest abdomen and pelvis does not reveal any new source of sepsis  Urinalysis nondiagnostic for UTI  The abdominal wound does not appear to be overtly infected  Unclear significance of the extremely high alkaline phosphatase level with the total bilirubin normal and the remainder of the transaminases not proportionally high  Consideration for the possibility of bacteremia   -Discontinue Zosyn  -We will treat with meropenem 1 g IV every 12 hours for now awaiting additional data  -Follow-up blood cultures  -Surgical evaluation  -Check right upper quadrant ultrasound  -Recheck CBC with differential, CMP, procalcitonin level  -Supportive care    2  Intra-abdominal abscesses  Status post IR drainage with drains remaining in place but with improved findings on repeat CT scan  Patient completed several weeks of intravenous antibiotics  No evidence of a new abscess   -Antibiotics as above for now  -Consult surgery  -Serial exams    3  ESBL E  coli bacteremia/abscess formation  Status post prolonged course of intravenous antibiotics with clearance of the bacteremia  Abscess is improved as above  -Antibiotics as above for now  -Follow-up blood cultures    4  Abdominal wound  No overt cellulitis or purulence seen although there is some exudate  Unclear significance of the superficial swab for culture with polymicrobial Gram stain  -Local wound care  -Serial abdominal exams  -Consult surgery for possible debridement    5  Metastatic colon cancer  Status post extensive surgical resection with reexploration in the setting of complication    6  Acute kidney injury    Suspect secondary to prerenal issues  No other clear source appreciated  Renal function improving with IV fluid resuscitation   -Recheck BMP  -Volume management  -Dose adjust the antibiotics as needed    Have discussed the above management plan in detail with the primary service    Extensive review of the medical records in epic including review of the notes, radiographs, and laboratory results     HISTORY OF PRESENT ILLNESS:  Reason for Consult: Sepsis  HPI: Annamarie Iniguez is a 61y o  year old male with diabetes mellitus, chronic kidney disease, gastroparesis, and metastatic colon cancer who is status post a prolonged hospitalization at [place] [address] in [place] after undergoing exploratory laparotomy, transverse colon resection, and segmental resection of the liver, ablation, peritoneal biopsy, reversal of colostomy, and takedown of the splenic flexure, with complications including ESBL E  coli bacteremia, intra-abdominal abscesses, requiring reexploration x2 with intervention, acute cholecystitis status post percutaneous cholecystotomy tube, and multiple other intra-abdominal drain placement who we are asked to assist with management of possible new onset sepsis  Patient had previously been hospitalized for more than 2 months and treated with several courses of antibiotics including most recently a 3-week course of intravenous ertapenem followed by doxycycline  He was eventually discharged home and was initially in acute rehab  He had been discharged from acute rehab 3 weeks prior to this admission he initially was doing reasonably well  However he began having failure to thrive, including global weakness, poor appetite, and difficulty with ambulation  He was diagnosed with a possible UTI and started on Bactrim but was intolerant of the Bactrim after 2 days and therefore was transition to oral amoxicillin  However he did not tolerate the oral amoxicillin    He apparently fell out of bed and was seen in the emergency department on 2/2/2023  His symptoms continue to deteriorate and there was a concern about his drainage in the VICTORINO drain as well as from the wound and therefore he was brought to the emergency department for further evaluation  In the emergency department the patient was afebrile but was found to have a brisk leukocytosis, acute kidney injury, and tachycardia  He had blood cultures obtained and was started on Zosyn and admitted for further management  CT of the chest abdomen pelvis does not reveal any pneumonia and reveals evidence of improving collections  No other new abscess was found  Urinalysis did not reveal any pyuria or bacteriuria  He denies any increased cough or shortness of breath, denies any sore throat rhinorrhea or nasal congestion, denies any dysuria or hematuria, denies any new rash or skin lesions  He does have some abdominal pain  He is not having any nausea vomiting or diarrhea  He has had good ostomy output  REVIEW OF SYSTEMS:  A complete review of systems is negative other than that noted in the HPI      PAST MEDICAL HISTORY:  Past Medical History:   Diagnosis Date   • Abdominal pain 03/12/2022   • Acute renal failure ([place] [id number] )     [id number] resolved   • Acute respiratory failure with hypoxia ([facility] ) 11/12/2022   • Bacteremia 11/12/2022   • Cancer ([facility] )    • Diabetes mellitus ([facility] )    • Enteritis 08/23/2016   • Flash pulmonary edema ([facility] ) 11/12/2022   • Gastroparesis due to DM ([facility] ) 08/23/2016   • GERD (gastroesophageal reflux disease)    • Hernia, ventral 08/04/2016   • Hyperlipidemia    • Hypertension    • Morbid obesity ([facility] ) 04/17/2018   • Postoperative visit 03/02/2022   • SIRS (systemic inflammatory response syndrome) ([facility] ) 03/12/2022   • Snoring    • Stage 3a chronic kidney disease ([facility] ) 02/19/2022     Past Surgical History:   Procedure Laterality Date   • COLONOSCOPY     • COLOSTOMY N/A 02/20/2022    Procedure: COLOSTOMY LOOP, diverting;  Surgeon: Nicki Fong MD;  Location: [facility];  Service: General   • ESOPHAGOGASTRODUODENOSCOPY N/A 08/24/2016    Procedure: ESOPHAGOGASTRODUODENOSCOPY (EGD); Surgeon: Rock Simon MD;  Location: [facility];   Service:    • EXPLORATORY LAPAROTOMY W/ BOWEL RESECTION N/A 11/16/2022    Procedure: LAPAROTOMY EXPLORATORY W/ BOWEL RESECTION- VAC PLACEMENT;  Surgeon: Raegan Olmos MD;  [facility]: [facility];  Service: Surgical Oncology   • EXPLORATORY LAPAROTOMY W/ BOWEL RESECTION N/A 11/17/2022    Procedure: LAPAROTOMY EXPLORATORY W/ BOWEL RESECTION;  Surgeon: Raegan Olmos MD;  [facility];  Service: Surgical Oncology   • ILEOSTOMY N/A 11/17/2022    Procedure: ILEOSTOMY;  Surgeon: Raegan Olmos MD;  [facility];  Service: Surgical Oncology   • ILEOSTOMY CLOSURE N/A 11/7/2022    Procedure: REVERSAL COLOSTOMY;  Surgeon: Ankur Negron MD;  [facility];  Service: Surgical Oncology   • IR CHOLECYSTOSTOMY TUBE CHECK/CHANGE/REPOSITION/REINSERTION/UPSIZE  12/12/2022   • IR CHOLECYSTOSTOMY TUBE PLACEMENT  12/8/2022   • IR DRAINAGE TUBE CHECK AND/OR REMOVAL  1/3/2023   • IR DRAINAGE TUBE CHECK/CHANGE/REPOSITION/REINSERTION/UPSIZE  1/12/2023   • IR DRAINAGE TUBE CHECK/CHANGE/REPOSITION/REINSERTION/UPSIZE  1/31/2023   • IR DRAINAGE TUBE PLACEMENT  12/8/2022   • IR DRAINAGE TUBE PLACEMENT  12/20/2022   • IR PORT PLACEMENT  03/10/2022   • IR THORACENTESIS  1/12/2023   • KIDNEY STONE SURGERY     • LIVER BIOPSY LAPAROSCOPIC N/A 02/20/2022    Procedure: DIAGNOSTIC LAPAROSCOPIC LIVER BIOPSY, DIVERTING LOOP COLOSTOMY ;  Surgeon: Veronica Betancourt MD;  [facility];  Service: General   • LIVER LOBECTOMY N/A 11/7/2022    Procedure: SEGMENT 3 LIVER RESECTION, ABLATION, INTRAOPERATIVE U/S OF LIVER, PERITONEAL BIOPSY;  Surgeon: Ankur Negron MD;  [facility];  Service: Surgical Oncology   • RIGHT COLON RESECTION N/A 11/7/2022    Procedure: EX LAP, TRANVERSE COLON RESECTION;  Surgeon: Ankur Negron MD;  [facility]; Service: Surgical Oncology   • TONSILLECTOMY     • UMBILICAL HERNIA REPAIR LAPAROSCOPIC N/A 2019    Procedure: LAPAROSCOPIC UMBILICAL HERNIA REPAIR;  Surgeon: Joe Bradley MD;  [facility];  Service: General   • VAC DRESSING APPLICATION N/A     Procedure: APPLICATION VAC DRESSING ABDOMEN/TRUNK;  Surgeon: Ayanna Smalls MD;  [facility];  Service: Surgical Oncology       FAMILY HISTORY:  Non-contributory    SOCIAL HISTORY:  Social History   Social History     Substance and Sexual Activity   Alcohol Use Never     Social History     Substance and Sexual Activity   Drug Use No     Social History     Tobacco Use   Smoking Status Never   Smokeless Tobacco Never       ALLERGIES:  Allergies   Allergen Reactions   • Shellfish-Derived Products - Food Allergy Anaphylaxis   • Erythromycin GI Intolerance       MEDICATIONS:  All current active medications have been reviewed    Antibiotics: Zosyn 2    PHYSICAL EXAM:  Temp:  [97 6 °F (36 4 °C)-98 9 °F (37 2 °C)] 98 1 °F (36 7 °C)  HR:  [] 96  Resp:  [14-28] 18  BP: (111-152)/(69-90) 131/83  SpO2:  [97 %-100 %] 98 %  Temp (24hrs), Av 3 °F (36 8 °C), Min:97 6 °F (36 4 °C), Max:98 9 °F (37 2 °C)  Current: Temperature: 98 1 °F (36 7 °C)    Intake/Output Summary (Last 24 hours) at 2023 1000  Last data filed at 2023 0555  Gross per 24 hour   Intake --   Output 880 ml   Net -880 ml       General Appearance:  Appearing well, nontoxic, and in no distress   Head:  Normocephalic, without obvious abnormality, atraumatic   Eyes:  Conjunctiva pink and sclera anicteric, both eyes   Nose: Nares normal, mucosa normal, no drainage   Throat: Oropharynx moist without lesions   Neck: Supple, symmetrical, no adenopathy, no tenderness/mass/nodules   Back:   Symmetric, no curvature, ROM normal, no CVA tenderness   Lungs:   Clear to auscultation bilaterally, respirations unlabored   Chest Wall:  No tenderness or deformity   Heart:  RRR; no murmur, rub or gallop   Abdomen:   Soft, non-tender, non-distended, positive bowel sounds  Wound without purulence or surrounding erythema  Some exudate noted  Ostomy with good output  Right-sided biliary drain in place  VICTORINO drain in place with turbid output  Extremities: No cyanosis, clubbing or edema   Skin: No rashes or lesions  No draining wounds noted  Lymph nodes: Cervical, supraclavicular nodes normal   Neurologic: Alert and oriented times 3, extremity strength 5/5 and symmetric       LABS, IMAGING, & OTHER STUDIES:  Lab Results:  I have personally reviewed pertinent labs  Results from last 7 days   Lab Units 02/08/23  0337 02/07/23  1530   WBC Thousand/uL 21 75* 28 00*   HEMOGLOBIN g/dL 8 0* 9 8*   PLATELETS Thousands/uL 448* 669*     Results from last 7 days   Lab Units 02/08/23  0337 02/07/23 2014 02/07/23  1530   SODIUM mmol/L 134* 132* 129*   POTASSIUM mmol/L 4 8 5 1 5 9*   CHLORIDE mmol/L 102 101 98   CO2 mmol/L 21 16* 15*   BUN mg/dL 53* 60* 69*   CREATININE mg/dL 1 93* 2 20* 2 52*   EGFR ml/min/1 73sq m 37 31 27   CALCIUM mg/dL 8 6 8 8 9 4   AST U/L 61*  --  62*   ALT U/L 20  --  26   ALK PHOS U/L 1,043*  --  1,300*     Results from last 7 days   Lab Units 02/07/23  1759 02/07/23  1530   BLOOD CULTURE   --  Received in Microbiology Lab  Culture in Progress  Received in Microbiology Lab  Culture in Progress  GRAM STAIN RESULT  3+ Gram positive rods*  1+ Gram positive cocci in chains*  Rare Disintegrating polys*  --      Results from last 7 days   Lab Units 02/08/23  0337 02/07/23  1530   PROCALCITONIN ng/ml 1 37* 1 07*                   Imaging Studies:     CT chest abdomen pelvis-no pneumonia  Decreased size of the small perihepatic collection  No new abscesses      Images personally reviewed by me in PACS

## 2023-02-08 NOTE — ASSESSMENT & PLAN NOTE
· CT shows interval decrease in bilateral pleural effusions, however multiple small bilateral lung nodules are seen which are concerning for possible metastatic lung disease  · Patient reports having prior known lung nodules  · Patient does have some complaints of SOB, however is oxygenating appropriately on room air; continue to monitor oxygenation  · Outpatient f/u

## 2023-02-08 NOTE — PHYSICAL THERAPY NOTE
PHYSICAL THERAPY EVALUATION  NAME:  Marta Ahumada  DATE: 02/08/23    AGE:   61 y o  Mrn:   [id number]  ADMIT DX:  Abdominal pain [R10 9]  Wound infection [T14  8XXA, L08 9]  Acute kidney injury ([address] ) [N17 9]  Sepsis, due to unspecified organism, unspecified whether acute organ dysfunction present [facility]) [A41 9]  Problem List:   Patient Active Problem List   Diagnosis    Type 2 diabetes mellitus without complication, with long-term current use of insulin ([address] )    Benign essential hypertension    Mixed hyperlipidemia    Erectile dysfunction    Low testosterone    Obesity (BMI 30-39  9)    Testicular hypogonadism    Incarcerated umbilical hernia    Left ureteral calculus    Type 2 diabetes mellitus with hyperlipidemia (HCC)    Microcytic anemia    Hypokalemia    Transaminitis    Thrombocytosis    Iron deficiency anemia, unspecified    Malignant neoplasm of transverse colon (HCC)    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)    Colon cancer metastasized to liver [facility])    Colostomy prolapse (HCC)    Other fatigue    Cervical radiculopathy    Encephalopathy    MR (mitral regurgitation)    ESBL (extended spectrum beta-lactamase) producing bacteria infection    Severe protein-calorie malnutrition (HCC)    Acute on chronic kidney failure (HCC)    Sepsis (HCC)    Abscess    Acute pain    Leukocytosis    Dehiscence of incision    Elevated alkaline phosphatase level    Abnormal CT scan    Hyperkalemia    Hyponatremia       Past Medical History  Past Medical History:   Diagnosis Date    Abdominal pain 03/12/2022    Acute renal failure ([address] )     [id number] resolved    Acute respiratory failure with hypoxia ([address] ) 11/12/2022    Bacteremia 11/12/2022    Cancer ([facility] )     Diabetes mellitus ([address] )     Enteritis 08/23/2016    Flash pulmonary edema ([address] ) 11/12/2022    Gastroparesis due to DM ([address] ) 08/23/2016    GERD (gastroesophageal reflux disease)     Hernia, ventral 08/04/2016    Hyperlipidemia     Hypertension     Morbid obesity ([facility] ) 04/17/2018    Postoperative visit 03/02/2022    SIRS (systemic inflammatory response syndrome) ([facility] ) 03/12/2022    Snoring     Stage 3a chronic kidney disease ([facility] ) 02/19/2022       Past Surgical History  Past Surgical History:   Procedure Laterality Date    COLONOSCOPY      COLOSTOMY N/A 02/20/2022    Procedure: COLOSTOMY LOOP, diverting;  Surgeon: Luz Maria Kang MD;  Location: [facility];  Service: General    ESOPHAGOGASTRODUODENOSCOPY N/A 08/24/2016    Procedure: ESOPHAGOGASTRODUODENOSCOPY (EGD); Surgeon: Ten Teixeira MD;  Location: [facility];   Service:     EXPLORATORY LAPAROTOMY W/ BOWEL RESECTION N/A 11/16/2022    Procedure: LAPAROTOMY EXPLORATORY W/ BOWEL RESECTION- VAC PLACEMENT;  Surgeon: Sanford Cook MD;  Location: [facility];  Service: Surgical Oncology    EXPLORATORY LAPAROTOMY W/ BOWEL RESECTION N/A 11/17/2022    Procedure: LAPAROTOMY EXPLORATORY W/ BOWEL RESECTION;  Surgeon: Sanford Cook MD;  [facility]: [facility];  Service: Surgical Oncology    ILEOSTOMY N/A 11/17/2022    Procedure: ILEOSTOMY;  Surgeon: Sanfodr Cook MD;  Location: [facility];  Service: Surgical Oncology    ILEOSTOMY CLOSURE N/A 11/7/2022    Procedure: REVERSAL COLOSTOMY;  Surgeon: Chastity Caban MD;  [facility];  Service: Surgical Oncology    IR CHOLECYSTOSTOMY TUBE CHECK/CHANGE/REPOSITION/REINSERTION/UPSIZE  12/12/2022    IR CHOLECYSTOSTOMY TUBE PLACEMENT  12/8/2022    IR DRAINAGE TUBE CHECK AND/OR REMOVAL  1/3/2023    IR DRAINAGE TUBE CHECK/CHANGE/REPOSITION/REINSERTION/UPSIZE  1/12/2023    IR DRAINAGE TUBE CHECK/CHANGE/REPOSITION/REINSERTION/UPSIZE  1/31/2023    IR DRAINAGE TUBE PLACEMENT  12/8/2022    IR DRAINAGE TUBE PLACEMENT  12/20/2022    IR PORT PLACEMENT  03/10/2022    IR THORACENTESIS  1/12/2023    KIDNEY STONE SURGERY      LIVER BIOPSY LAPAROSCOPIC N/A 02/20/2022    Procedure: DIAGNOSTIC LAPAROSCOPIC LIVER BIOPSY, DIVERTING LOOP COLOSTOMY ;  Surgeon: Luz Maria Kang MD;  [facility];  Service: General    LIVER LOBECTOMY N/A 11/7/2022    Procedure: SEGMENT 3 LIVER RESECTION, ABLATION, INTRAOPERATIVE U/S OF LIVER, PERITONEAL BIOPSY;  Surgeon: Reji Mendez MD;  [facility]: [facility];  Service: Surgical Oncology    RIGHT COLON RESECTION N/A 11/7/2022    Procedure: EX LAP, TRANVERSE COLON RESECTION;  Surgeon: Reji Mendez MD;  [facility];  Service: Surgical Oncology    TONSILLECTOMY      UMBILICAL HERNIA REPAIR LAPAROSCOPIC N/A 04/26/2019    Procedure: LAPAROSCOPIC UMBILICAL HERNIA REPAIR;  Surgeon: Eladia Randle MD;  [facility]: [facility];  Service: General    VAC DRESSING APPLICATION N/A 68/52/1658    Procedure: APPLICATION VAC DRESSING ABDOMEN/TRUNK;  Surgeon: Rainer Whitaker MD;  [facility]: [facility];  Service: Surgical Oncology       Length Of Stay: 1  Performed at least 2 patient identifiers during session: Name and Birthday     02/08/23 1029   PT Last Visit   PT Visit Date 02/08/23   Note Type   Note type Evaluation   Pain Assessment   Pain Assessment Tool 0-10   Pain Score 5   Pain Location/Orientation Orientation: Mid;Location: Abdomen   Pain Radiating Towards across abdomen radiates to the back   Patient's Stated Pain Goal No pain   Hospital Pain Intervention(s) Repositioned; Ambulation/increased activity; Emotional support   Restrictions/Precautions   Weight Bearing Precautions Per Order No   Braces or Orthoses Other (Comment)  (none reported)   Other Precautions Chair Alarm; Bed Alarm; Fall Risk;Pain  (abdominal drains x2  abdominal precautions)   Home Living   Type of [address] [place] [address] Multi-level;Stairs to enter without rails; Performs ADLs on one level; Able to live on main level with bedroom/bathroom;1/2 bath on main level  (3 ADRIAN)   Bathroom Shower/Tub   (sponge bathing at baseline)   [employer]   (pt reports use of urinal at baseline - states he does not access the bathroom)   Bathroom Equipment Other (Comment)  (none reported)   Home Equipment Lia Whiting; Wheelchair-manual;Hospital bed   Additional Comments pt reports use of RW at baseline   Prior Function   Level of [place] Needs assistance with ADLs; Needs assistance with IADLS   Lives With Spouse; Family   Receives Help From Family   IADLs Family/Friend/Other provides transportation; Family/Friend/Other provides meals; Family/Friend/Other provides medication management   Falls in the last 6 months 1 to 4  (2 falls in the last week)   Vocational On disability   General   Family/Caregiver Present No   Cognition   Overall Cognitive Status WFL   Arousal/Participation Cooperative   Attention Within functional limits   Orientation Level Oriented X4   Memory Within functional limits   Following Commands Follows all commands and directions without difficulty   Comments Pt agreeable to PT evaluation   Subjective   Subjective "I was needing more help at home to do everything"   RLE Assessment   RLE Assessment   (grossly 3+/5)   LLE Assessment   LLE Assessment   (grossly 3+/5)   Coordination   Sensation   (pt reports history of neuropathy to B hands)   Bed Mobility   Supine to Sit 4  Minimal assistance   Additional items Assist x 2;HOB elevated; Increased time required; Bedrails   Sit to Supine   (not tested as pt seated in bedside chair at end of session)   Additional Comments Vitals at end of session BP: 128/83, HR: 101bpm, SPo2: 97% on RA   Transfers   Sit to Stand 4  Minimal assistance   Additional items Assist x 2; Increased time required;Verbal cues   Stand to Sit 4  Minimal assistance   Additional items Assist x 2; Increased time required;Verbal cues   Stand pivot 4  Minimal assistance   Additional items Assist x 2; Increased time required;Verbal cues   Additional Comments RW used during transfers  Ambulation/Elevation   Gait pattern Improper Weight shift;Decreased foot clearance; Short stride; Step to;Decreased heel strike   Gait Assistance 4  Minimal assist   Additional items Assist x 2;Verbal cues   Assistive Device Rolling walker   Distance 3'  (to bedside chair)   Stair Management Assistance Not tested   Balance   Static Sitting Fair +   Dynamic Sitting Fair   Static Standing Poor +   Dynamic Standing Poor   Ambulatory Poor   Endurance Deficit   Endurance Deficit Yes   Endurance Deficit Description decreased activity tolerance   Activity Tolerance   Activity Tolerance Patient limited by fatigue   Medical Staff Made Aware Co treatment with NITIN Watkins secondary to complex medical condition of pt, possible A of 2 required to achieve and maintain transitional movements, requiring the need of skilled therapeutic intervention of 2 therapists to achieve delivery of services  Nurse Made Aware DAKSHA Dao confirmed pt appropriate for PT session   Assessment   Prognosis Fair   Problem List Decreased strength;Decreased endurance; Impaired balance;Decreased mobility; Decreased safety awareness;Pain   Assessment Pt is 61 y o  male seen for high-complexity PT evaluation on 2/8/2023 s/p admit to [facility] on 2/7/2023 w/ Sepsis ([facility] )  PT was consulted to assess pt's functional mobility and d/c needs  Order placed for PT eval and tx, w/ up w/ A order  PTA, pt was living with family in a multi-level home with first floor set up and 3 ADRIAN  Pt reports assist required at baseline with ADLs, IADLs, and reports use of RW at baseline for mobility  At time of eval, patient required minAx2 for sup > sit, STS, and SPT with RW and able to take 3 steps to bedside chair minAx2  Upon evaluation, pt presenting with impaired functional mobility d/t decreased strength, decreased endurance, impaired balance, decreased mobility, decreased safety awareness, pain and activity intolerance  Pertinent PMHx and current co-morbidities affecting pt's physical performance at time of assessment include: acute on chronic kidney failure, colon cancer metastisized to liver, benign essential hypertension, sepsis   Personal factors affecting pt at time of eval include: inaccessible home environment, lives in multi story house, ambulating w/ assistive device, stairs to enter home, inability to ambulate household distances, inability to navigate level surfaces w/o external assistance, positive fall history and unable to perform physical activity  The following objective measures performed on IE also reveal limitations: AM-PAC 6-Clicks: 38/99  Pt's clinical presentation is currently unstable/unpredictable seen in pt's presentation of abnormal lab value(s), need for two person min assist w/ all phases of mobility when usually mobilizing independently, tolerance to only 3 feet of ambulation, abdominal pain impacting overall mobility status and ongoing medical assessment  Overall, pt's rehab potential and prognosis to return to PLOF is fair as impacted by objective findings, warranting pt to receive further skilled PT interventions to address identified impairments, activity limitation(s), and participation restriction(s)  Goal for patient is to return home  Pt to benefit from continued PT tx to address deficits as defined above and maximize level of functional independent mobility and consistency in order for pt to incresae safety and independence with functional mobility  From PT/mobility standpoint, recommendation at time of d/c would be post acute rehabilitation services pending progress in order to facilitate return to PLOF     Barriers to Discharge Inaccessible home environment   Goals   Patient Goals to feel stronger   STG Expiration Date 02/18/23   Short Term Goal #1 In 10 days: Perform all bed mobility tasks modified independent to decrease caregiver burden, Perform all transfers modified independent to improve independence, Ambulate > 50 ft  with RW modified independent w/o LOB and w/ normalized gait pattern 100% of the time, Increase all balance 1/2 grade to decrease risk for falls and PT to see and establish goals for stairs when appropriate   PT Treatment Day 0 Plan   Treatment/Interventions Functional transfer training; Therapeutic exercise; Endurance training;Patient/family training;Bed mobility;Gait training;Spoke to nursing;Continued evaluation;OT   PT Frequency 3-5x/wk   Recommendation   PT Discharge Recommendation Post acute rehabilitation services   Equipment Recommended [facility] Recommended Wheeled walker   AM-PAC Basic Mobility Inpatient   Turning in Flat Bed Without Bedrails 3   Lying on Back to Sitting on Edge of Flat Bed Without Bedrails 2   Moving Bed to Chair 2   Standing Up From Chair Using Arms 2   Walk in Room 2   Climb 3-5 Stairs With Railing 1   Basic Mobility Inpatient Raw Score 12   Basic Mobility Standardized Score 32 23   Highest Level Of Mobility   JH-HLM Goal 4: Move to chair/commode   JH-HLM Achieved 4: Move to chair/commode   End of Consult   Patient Position at End of Consult Bed/Chair alarm activated; All needs within reach; Bedside chair   Time In: 1005  Time Out: 1029  Total Evaluation Minutes: 24    Silva Cormier, PT

## 2023-02-08 NOTE — ASSESSMENT & PLAN NOTE
Lab Results   Component Value Date    HGBA1C 8 0 (H) 09/26/2022     ·   · Continue home Lantus 8U qHS and humalog 4U TID w/meals   · 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  · Hypoglycemia protocol  · Diabetic diet

## 2023-02-08 NOTE — PROGRESS NOTES
Occupational Therapy Discharge Summary       Pt has made good Progress in Occupational Therapy  Pt MET long term goals  Pt progressed to supervision  level for functional transfers with RW and W/C  supervision level for ADL function with use of RW  Pt discharged home with spouse and with family support  OT recommending use of walker and wheelchair   and pt/family received and/or purchased prior to d/c for home use  Family training was completed prior to d/c to maximize independence and safety with ADL/IADL function at home environment  Pt safe to d/c home at current level of function and continues to present with deficits of weakness, impaired balance, decreased endurance and decreased activity tolerance  Recommending Intermittent Supervision for ADLS and daily activities at time of D/C  Pt does require further OT needs with recommendations for HOME OT at time of D/C to focus on self care

## 2023-02-08 NOTE — ASSESSMENT & PLAN NOTE
Echo reviewed July 2019, EF 30-35%, with grade 3 diastolic dysfunction.  Reports compliance with Lasix 20 mg p.o. daily at home.  Denies dietary indiscretion.  Received Lasix 80 mg IV x1 in the ED.  Continue Lasix 40 mg IV b.i.d. x2 doses.  Monitor urine output and respiratory status.     · Follows w/ outpatient [facility] onc and palliative care  · Extensive abdominal surgical history (11/7 ex lap w/ section 3 liver resection, and development of left upper quadrant hematoma and suspected leak from transverse colon anastomosis complication; 39/52 right hemicolectomy; 11/17 re-exploration w/partial descending colectomy; 12/8 IR percutaneous cholecystostomy tube and hepatectomy abscess drainage; 12/20 IR drains placed for perisplenic abscess)  · Appears to have chronic elevation of alk phos in setting of liver mets  · See other issues as above  · Patient does not wish to follow with [facility] oncology anymore as it is too far of a drive; would like to establish with [facility] oncology (already follows with Dr Maggie Santana)  · Continue outpatient f/u

## 2023-02-08 NOTE — UTILIZATION REVIEW
NOTIFICATION OF INPATIENT ADMISSION   AUTHORIZATION REQUEST   SERVICING FACILITY:   [address] [place] [address]  Tax ID: [id number]  NPI: [id number] ATTENDING PROVIDER:  Attending Name and NPI#: Mukul Delvalle [[id number]]  Address: [address] [place] [address]  Phone: [phone]     ADMISSION INFORMATION:  Place of Service: [place] [address]  Place of Service Code: 21  Inpatient Admission Date/Time: 2/7/23  7:59 PM  Discharge Date/Time: No discharge date for patient encounter  Admitting Diagnosis Code/Description:  Abdominal pain [R10 9]  Wound infection [T14  8XXA, L08 9]  Acute kidney injury ([facility] ) [N17 9]  Sepsis, due to unspecified organism, unspecified whether acute organ dysfunction present [facility]) [A41 9]     UTILIZATION REVIEW CONTACT:  Car Iraheta Utilization   Network Utilization Review Department  Phone: [phone]  Fax [phone]  Email: [email or web address]  Contact for approvals/pending authorizations, clinical reviews, and discharge  PHYSICIAN ADVISORY SERVICES:  Medical Necessity Denial & Jdxu-oa-Yjbd Review  Phone: [phone]  Fax: [phone]  Email: [email or web address]

## 2023-02-08 NOTE — CONSULTS
Consult- General Surgery   Dwayne Hodges 61 y o  male MRN: [id number]  Unit/Bed#: -01 Encounter: [id number]      Assessment/Plan     Dwayne Hodges is a 61 y o  male with midline surgical wound infection  VSS, WBC 21 75 from 28  Hemoglobin 8 from 9 8 from 8 9  Baseline CKD, creatinine 1 9 from 2 2  CTAP: 1  Interval decrease in bilateral pleural effusions with development of multiple small bilateral lung nodules concerning for metastatic lung disease  2   Fluid collection at the level of the left lobe of the liver is smaller with resolution of the other perisplenic fluid collections and improvement in collections in the left retroperitoneum inferior to the spleen  Plan:  • Bedside debridement today for midline surgical wound  • Wet-to-dry dressing changes daily  • Okay to continue regular diet  • As needed pain medication and antiemetics  • Encourage ambulation 3 times daily  • DVT prophylaxis: Heparin  • Continue home medications as prescribed  • Remainder of care per primary team -Slim  • General surgery will continue to follow  Tiger text with any questions or concerns  History of Present Illness     HPI:  Dwayne Hodges is a 61 y o  male who presents with increasing fatigue, weakness status post discharge from rehab facility after prolonged hospitalization and extensive abdominal surgeries secondary to colon cancer and postoperative complications  He reports increase in fatigue and weakness after being discharged from rehab  He also reports increasing purulent discharge and foul smell from his midline surgical wound  The patient denies CP, SOB, palpitations, headache, fever, chills, unintentional weight loss, night sweats, nausea, vomiting, constipation, diarrhea  Review of Systems   Constitutional: Positive for fatigue  Negative for chills and fever  HENT: Negative for ear pain and sore throat  Eyes: Negative for pain and visual disturbance     Respiratory: Negative for cough and shortness of breath  Cardiovascular: Negative for chest pain and palpitations  Gastrointestinal: Positive for abdominal pain (surgical incision)  Negative for vomiting  Genitourinary: Negative for dysuria and hematuria  Musculoskeletal: Negative for arthralgias and back pain  Skin: Positive for wound (surgical incision with increasing purulent discharge and foul smell)  Negative for color change and rash  Neurological: Positive for weakness  Negative for seizures and syncope  All other systems reviewed and are negative  Historical Information   Past Medical History:   Diagnosis Date   • Abdominal pain 03/12/2022   • Acute renal failure ([place] [address] )     [id number] resolved   • Acute respiratory failure with hypoxia ([place] [address] ) 11/12/2022   • Bacteremia 11/12/2022   • Cancer ([place] [address] )    • Diabetes mellitus ([place] [address] )    • Enteritis 08/23/2016   • Flash pulmonary edema ([place] [address] ) 11/12/2022   • Gastroparesis due to DM ([place] [address] ) 08/23/2016   • GERD (gastroesophageal reflux disease)    • Hernia, ventral 08/04/2016   • Hyperlipidemia    • Hypertension    • Morbid obesity ([place] [address] ) 04/17/2018   • Postoperative visit 03/02/2022   • SIRS (systemic inflammatory response syndrome) ([place] [address] ) 03/12/2022   • Snoring    • Stage 3a chronic kidney disease ([place] [address] ) 02/19/2022     Past Surgical History:   Procedure Laterality Date   • COLONOSCOPY     • COLOSTOMY N/A 02/20/2022    Procedure: COLOSTOMY LOOP, diverting;  Surgeon: Merline Dunnings, MD;  [facility];  Service: General   • ESOPHAGOGASTRODUODENOSCOPY N/A 08/24/2016    Procedure: ESOPHAGOGASTRODUODENOSCOPY (EGD); Surgeon: Satish Pinto MD;  [facility];   Service:    • EXPLORATORY LAPAROTOMY W/ BOWEL RESECTION N/A 11/16/2022    Procedure: LAPAROTOMY EXPLORATORY W/ BOWEL RESECTION- VAC PLACEMENT;  Surgeon: Rock Marixa MD;  Location:  MAIN OR;  Service: Surgical Oncology   • EXPLORATORY LAPAROTOMY W/ BOWEL RESECTION N/A 11/17/2022    Procedure: Jin Maurer EXPLORATORY W/ BOWEL RESECTION;  Surgeon: Richa Hickey MD;  [facility];  Service: Surgical Oncology   • ILEOSTOMY N/A 11/17/2022    Procedure: ILEOSTOMY;  Surgeon: Richa Hickey MD;  [facility];  Service: Surgical Oncology   • ILEOSTOMY CLOSURE N/A 11/7/2022    Procedure: REVERSAL COLOSTOMY;  Surgeon: Wes Madera MD;  [facility];  Service: Surgical Oncology   • IR CHOLECYSTOSTOMY TUBE CHECK/CHANGE/REPOSITION/REINSERTION/UPSIZE  12/12/2022   • IR CHOLECYSTOSTOMY TUBE PLACEMENT  12/8/2022   • IR DRAINAGE TUBE CHECK AND/OR REMOVAL  1/3/2023   • IR DRAINAGE TUBE CHECK/CHANGE/REPOSITION/REINSERTION/UPSIZE  1/12/2023   • IR DRAINAGE TUBE CHECK/CHANGE/REPOSITION/REINSERTION/UPSIZE  1/31/2023   • IR DRAINAGE TUBE PLACEMENT  12/8/2022   • IR DRAINAGE TUBE PLACEMENT  12/20/2022   • IR PORT PLACEMENT  03/10/2022   • IR THORACENTESIS  1/12/2023   • KIDNEY STONE SURGERY     • LIVER BIOPSY LAPAROSCOPIC N/A 02/20/2022    Procedure: DIAGNOSTIC LAPAROSCOPIC LIVER BIOPSY, DIVERTING LOOP COLOSTOMY ;  Surgeon: Valerie Moya MD;  [facility];  Service: General   • LIVER LOBECTOMY N/A 11/7/2022    Procedure: SEGMENT 3 LIVER RESECTION, ABLATION, INTRAOPERATIVE U/S OF LIVER, PERITONEAL BIOPSY;  Surgeon: Wes Madera MD;  [facility];  Service: Surgical Oncology   • RIGHT COLON RESECTION N/A 11/7/2022    Procedure: EX LAP, TRANVERSE COLON RESECTION;  Surgeon: Wes Madera MD;  [facility];  Service: Surgical Oncology   • TONSILLECTOMY     • UMBILICAL HERNIA REPAIR LAPAROSCOPIC N/A 04/26/2019    Procedure: LAPAROSCOPIC UMBILICAL HERNIA REPAIR;  Surgeon: Valerie Moya MD;  [facility];  Service: General   • VAC DRESSING APPLICATION N/A 41/13/8577    Procedure: APPLICATION VAC DRESSING ABDOMEN/TRUNK;  Surgeon: Richa Hickey MD;  [facility];  Service: Surgical Oncology     Social History   Social History     Substance and Sexual Activity   Alcohol Use Never     Social History     Substance and Sexual Activity   Drug Use No     Social History     Tobacco Use   Smoking Status Never   Smokeless Tobacco Never     Family History: non-contributory    Meds/Allergies   all medications and allergies reviewed  Allergies   Allergen Reactions   • Shellfish-Derived Products - Food Allergy Anaphylaxis   • Erythromycin GI Intolerance       Objective   First Vitals:   Blood Pressure: 111/69 (02/07/23 1415)  Pulse: (!) 122 (02/07/23 1415)  Temperature: 98 9 °F (37 2 °C) (02/07/23 1415)  Temp Source: Tympanic (02/07/23 1415)  Respirations: 20 (02/07/23 1415)  Height: 5' 10" (177 8 cm) (02/07/23 2223)  Weight - Scale: 88 5 kg (195 lb) (02/07/23 2223)  SpO2: 100 % (02/07/23 1415)    Current Vitals:   Blood Pressure: 128/83 (02/08/23 1008)  Pulse: 101 (02/08/23 1008)  Temperature: 98 1 °F (36 7 °C) (02/08/23 0731)  Temp Source: Oral (02/07/23 2223)  Respirations: 18 (02/08/23 0731)  Height: 5' 10" (177 8 cm) (02/07/23 2223)  Weight - Scale: 88 5 kg (195 lb) (02/07/23 2223)  SpO2: 97 % (02/08/23 1008)      Intake/Output Summary (Last 24 hours) at 2/8/2023 1115  Last data filed at 2/8/2023 0555  Gross per 24 hour   Intake --   Output 880 ml   Net -880 ml       Invasive Devices     Central Venous Catheter Line  Duration           Port A Cath 03/10/22 Right Chest 335 days          Peripheral Intravenous Line  Duration           Peripheral IV 02/07/23 Left;Proximal;Ventral (anterior) Forearm <1 day          Drain  Duration           Ileostomy LUQ 82 days    Cholecystostomy Tube 57 days    Abscess Drain Abdomen 26 days                Physical Exam  Vitals reviewed  Constitutional:       General: He is not in acute distress  Appearance: Normal appearance  He is not ill-appearing or toxic-appearing  HENT:      Head: Normocephalic and atraumatic        Right Ear: External ear normal       Left Ear: External ear normal       Nose: Nose normal       Mouth/Throat:      Mouth: Mucous membranes are moist    Eyes:      General: No scleral icterus  Right eye: No discharge  Left eye: No discharge  Conjunctiva/sclera: Conjunctivae normal    Cardiovascular:      Rate and Rhythm: Normal rate and regular rhythm  Pulmonary:      Effort: Pulmonary effort is normal  No respiratory distress  Abdominal:      General: There is no distension  Palpations: Abdomen is soft  Tenderness: There is abdominal tenderness (midline incision with purulent drainage and slough at base of wound  )  There is no guarding  Musculoskeletal:         General: Normal range of motion  Cervical back: Normal range of motion  Skin:     General: Skin is warm  Coloration: Skin is not jaundiced  Neurological:      General: No focal deficit present  Mental Status: He is alert and oriented to person, place, and time  Psychiatric:         Mood and Affect: Mood normal          Behavior: Behavior normal          Thought Content: Thought content normal          Judgment: Judgment normal          Lab Results: I have personally reviewed pertinent lab results  Recent Results (from the past 36 hour(s))   ECG 12 lead    Collection Time: 02/07/23  3:08 PM   Result Value Ref Range    Ventricular Rate 115 BPM    Atrial Rate 115 BPM    NJ Interval 156 ms    QRSD Interval 128 ms    QT Interval 348 ms    QTC Interval 481 ms    P Corn 63 degrees    QRS Axis 224 degrees    T Wave Axis 29 degrees   Blood culture #1    Collection Time: 02/07/23  3:30 PM    Specimen: Arm, Left; Blood   Result Value Ref Range    Blood Culture Received in Microbiology Lab  Culture in Progress  Blood culture #2    Collection Time: 02/07/23  3:30 PM    Specimen: Hand, Right; Blood   Result Value Ref Range    Blood Culture Received in Microbiology Lab  Culture in Progress      Lactic acid    Collection Time: 02/07/23  3:30 PM   Result Value Ref Range    LACTIC ACID 2 4 (HH) 0 5 - 2 0 mmol/L   Procalcitonin    Collection Time: 02/07/23  3:30 PM   Result Value Ref Range    Procalcitonin 1 07 (H) <=0 25 ng/ml   CBC and differential    Collection Time: 02/07/23  3:30 PM   Result Value Ref Range    WBC 28 00 (H) 4 31 - 10 16 Thousand/uL    RBC 3 77 (L) 3 88 - 5 62 Million/uL    Hemoglobin 9 8 (L) 12 0 - 17 0 g/dL    Hematocrit 32 2 (L) 36 5 - 49 3 %    MCV 85 82 - 98 fL    MCH 26 0 (L) 26 8 - 34 3 pg    MCHC 30 4 (L) 31 4 - 37 4 g/dL    RDW 17 9 (H) 11 6 - 15 1 %    MPV 9 2 8 9 - 12 7 fL    Platelets 227 (H) 999 - 390 Thousands/uL   Comprehensive metabolic panel    Collection Time: 02/07/23  3:30 PM   Result Value Ref Range    Sodium 129 (L) 135 - 147 mmol/L    Potassium 5 9 (H) 3 5 - 5 3 mmol/L    Chloride 98 96 - 108 mmol/L    CO2 15 (L) 21 - 32 mmol/L    ANION GAP 16 (H) 4 - 13 mmol/L    BUN 69 (H) 5 - 25 mg/dL    Creatinine 2 52 (H) 0 60 - 1 30 mg/dL    Glucose 298 (H) 65 - 140 mg/dL    Calcium 9 4 8 3 - 10 1 mg/dL    Corrected Calcium 11 2 (H) 8 3 - 10 1 mg/dL    AST 62 (H) 5 - 45 U/L    ALT 26 12 - 78 U/L    Alkaline Phosphatase 1,300 (H) 46 - 116 U/L    Total Protein 9 8 (H) 6 4 - 8 4 g/dL    Albumin 1 7 (L) 3 5 - 5 0 g/dL    Total Bilirubin 0 84 0 20 - 1 00 mg/dL    eGFR 27 ml/min/1 73sq m   Protime-INR    Collection Time: 02/07/23  3:30 PM   Result Value Ref Range    Protime 16 1 (H) 11 6 - 14 5 seconds    INR 1 32 (H) 0 84 - 1 19   FLU/RSV/COVID - if FLU/RSV clinically relevant    Collection Time: 02/07/23  3:30 PM    Specimen: Nose; Nares   Result Value Ref Range    SARS-CoV-2 Negative Negative    INFLUENZA A PCR Negative Negative    INFLUENZA B PCR Negative Negative    RSV PCR Negative Negative   Manual Differential(PHLEBS Do Not Order)    Collection Time: 02/07/23  3:30 PM   Result Value Ref Range    Total Counted      Target Cells Present     Platelet Estimate Increased (A) Adequate   Wound culture and Gram stain    Collection Time: 02/07/23  5:59 PM    Specimen: Abdominal; Wound   Result Value Ref Range    Gram Stain Result 3+ Gram positive rods (A)     Gram Stain Result 1+ Gram positive cocci in chains (A)     Gram Stain Result Rare Disintegrating polys (A)    Lactic acid 2 Hours    Collection Time: 02/07/23  5:59 PM   Result Value Ref Range    LACTIC ACID 1 2 0 5 - 2 0 mmol/L   UA w Reflex to Microscopic w Reflex to Culture    Collection Time: 02/07/23  6:05 PM    Specimen: Urine, Clean Catch   Result Value Ref Range    Color, UA Light Yellow     Clarity, UA Clear     Specific [place], UA 1 012 1 003 - 1 030    pH, UA 5 0 4 5, 5 0, 5 5, 6 0, 6 5, 7 0, 7 5, 8 0    Leukocytes, UA Negative Negative    Nitrite, UA Negative Negative    Protein, UA 30 (1+) (A) Negative mg/dl    Glucose, UA Negative Negative mg/dl    Ketones, UA Negative Negative mg/dl    Urobilinogen, UA <2 0 <2 0 mg/dl mg/dl    Bilirubin, UA Negative Negative    Occult Blood, UA Negative Negative   Urine Microscopic    Collection Time: 02/07/23  6:05 PM   Result Value Ref Range    RBC, UA 1-2 None Seen, 1-2 /hpf    WBC, UA 4-10 (A) None Seen, 1-2 /hpf    Epithelial Cells None Seen None Seen, Occasional /hpf    Bacteria, UA None Seen None Seen, Occasional /hpf   Basic metabolic panel    Collection Time: 02/07/23  8:14 PM   Result Value Ref Range    Sodium 132 (L) 135 - 147 mmol/L    Potassium 5 1 3 5 - 5 3 mmol/L    Chloride 101 96 - 108 mmol/L    CO2 16 (L) 21 - 32 mmol/L    ANION GAP 15 (H) 4 - 13 mmol/L    BUN 60 (H) 5 - 25 mg/dL    Creatinine 2 20 (H) 0 60 - 1 30 mg/dL    Glucose 222 (H) 65 - 140 mg/dL    Calcium 8 8 8 3 - 10 1 mg/dL    eGFR 31 ml/min/1 73sq m   Fingerstick Glucose (POCT)    Collection Time: 02/07/23 10:21 PM   Result Value Ref Range    POC Glucose 179 (H) 65 - 140 mg/dl   Procalcitonin, Next Day AM Collection    Collection Time: 02/08/23  3:37 AM   Result Value Ref Range    Procalcitonin 1 37 (H) <=0 25 ng/ml   Comprehensive metabolic panel    Collection Time: 02/08/23  3:37 AM   Result Value Ref Range    Sodium 134 (L) 135 - 147 mmol/L Potassium 4 8 3 5 - 5 3 mmol/L    Chloride 102 96 - 108 mmol/L    CO2 21 21 - 32 mmol/L    ANION GAP 11 4 - 13 mmol/L    BUN 53 (H) 5 - 25 mg/dL    Creatinine 1 93 (H) 0 60 - 1 30 mg/dL    Glucose 124 65 - 140 mg/dL    Calcium 8 6 8 3 - 10 1 mg/dL    Corrected Calcium 10 8 (H) 8 3 - 10 1 mg/dL    AST 61 (H) 5 - 45 U/L    ALT 20 12 - 78 U/L    Alkaline Phosphatase 1,043 (H) 46 - 116 U/L    Total Protein 7 8 6 4 - 8 4 g/dL    Albumin 1 3 (L) 3 5 - 5 0 g/dL    Total Bilirubin 0 75 0 20 - 1 00 mg/dL    eGFR 37 ml/min/1 73sq m   CBC and differential    Collection Time: 02/08/23  3:37 AM   Result Value Ref Range    WBC 21 75 (H) 4 31 - 10 16 Thousand/uL    RBC 3 10 (L) 3 88 - 5 62 Million/uL    Hemoglobin 8 0 (L) 12 0 - 17 0 g/dL    Hematocrit 25 7 (L) 36 5 - 49 3 %    MCV 83 82 - 98 fL    MCH 25 8 (L) 26 8 - 34 3 pg    MCHC 31 1 (L) 31 4 - 37 4 g/dL    RDW 17 9 (H) 11 6 - 15 1 %    MPV 9 2 8 9 - 12 7 fL    Platelets 772 (H) 453 - 390 Thousands/uL    nRBC 0 /100 WBCs    Neutrophils Relative 83 (H) 43 - 75 %    Immat GRANS % 1 0 - 2 %    Lymphocytes Relative 9 (L) 14 - 44 %    Monocytes Relative 7 4 - 12 %    Eosinophils Relative 0 0 - 6 %    Basophils Relative 0 0 - 1 %    Neutrophils Absolute 17 95 (H) 1 85 - 7 62 Thousands/µL    Immature Grans Absolute 0 27 (H) 0 00 - 0 20 Thousand/uL    Lymphocytes Absolute 1 86 0 60 - 4 47 Thousands/µL    Monocytes Absolute 1 61 (H) 0 17 - 1 22 Thousand/µL    Eosinophils Absolute 0 00 0 00 - 0 61 Thousand/µL    Basophils Absolute 0 06 0 00 - 0 10 Thousands/µL   Fingerstick Glucose (POCT)    Collection Time: 02/08/23  7:51 AM   Result Value Ref Range    POC Glucose 123 65 - 140 mg/dl     Imaging: I have personally reviewed pertinent reports  CT chest abdomen pelvis wo contrast    Result Date: 2/7/2023  Impression: 1  Interval decrease in bilateral pleural effusions with development of multiple small bilateral lung nodules concerning for metastatic lung disease   2   Fluid collection at the level of the left lobe of the liver is smaller with resolution of the other perisplenic fluid collections and improvement in collections in the left retroperitoneum inferior to the spleen  The study was marked in [facility] for immediate notification  Workstation performed: [id number]       EKG, Pathology, and Other Studies: I have personally reviewed pertinent reports

## 2023-02-08 NOTE — OCCUPATIONAL THERAPY NOTE
Occupational Therapy Evaluation        Patient Name: Felton SANTIAGO Date: 2/8/2023 02/08/23 1005   OT Last Visit   OT Visit Date 02/08/23   Note Type   Note type Evaluation   Pain Assessment   Pain Assessment Tool 0-10   Pain Score 5   Pain Location/Orientation Orientation: Mid;Location: Abdomen   Pain Radiating Towards across abdomen radiates to the back   Restrictions/Precautions   Weight Bearing Precautions Per Order No   Braces or Orthoses Other (Comment)  (None st basline)   Other Precautions Chair Alarm; Bed Alarm; Fall Risk;Pain   Home Living   Type of [address] [place] [address] Multi-level;Stairs to enter without rails; Performs ADLs on one level; Able to live on main level with bedroom/bathroom;1/2 bath on main level  (3 ADRIAN)   Bathroom Shower/Tub Tub/shower unit  (sponge bathing)   Bathroom Toilet   (not currently using bathroom)   Bathroom Equipment Other (Comment)  (none at baseline)   [facility]; Wheelchair-manual   Additional Comments ambulatory with RW   Prior Function   Level of [place] Needs assistance with ADLs; Needs assistance with IADLS   Lives With Spouse; Family   Receives Help From Family   IADLs Family/Friend/Other provides transportation; Family/Friend/Other provides meals; Family/Friend/Other provides medication management   Falls in the last 6 months 1 to 4  (2 falls in the last 3 weeks)   Vocational On disability   Lifestyle   Autonomy Patient reported that upon completeing Acute Rehab at the [facility], he was ambulatory with a walker  Had 2 falls in the last 3 weeks at home and reported that he has deteriorated  Patient lives in a multi level house, 1st floor set up with 3 ADRIAN     Reciprocal Relationships Supportive Family   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[facility]   Family/Caregiver Present No   ADL   Where Assessed Other (Comment)   Equipment Provided   (Performance levels based on overall functioning demonstrated during this session, using simulated self care tasks )   Eating Assistance 6  Modified independent   Grooming Assistance 5  Supervision/Setup   UB Bathing Assistance 3  Moderate Assistance   LB Bathing Assistance 2  Maximal Assistance  (dependent for lower leg and foot)   UB Dressing Assistance 4  Minimal Assistance   LB Dressing Assistance 2  Maximal Assistance  (dependent for lower leg and foot)   Toileting Assistance  3  Moderate Assistance   Functional Assistance 3  Moderate Assistance   Bed Mobility   Supine to Sit 4  Minimal assistance   Additional items Assist x 2;HOB elevated; Bedrails; Increased time required;Verbal cues;LE management   Transfers   Sit to Stand 4  Minimal assistance   Additional items Assist x 2; Increased time required;Verbal cues   Stand to Sit 4  Minimal assistance   Additional items Assist x 2; Increased time required;Verbal cues   Functional Mobility   Functional Mobility 3  Moderate assistance   Additional Comments assist of 2 with RW   Additional items Rolling walker   Balance   Static Sitting Fair +   Dynamic Sitting Fair   Static Standing Poor +   Dynamic Standing Poor   Activity Tolerance   Activity Tolerance Patient limited by fatigue;Patient limited by pain   RUE Assessment   RUE Assessment X  (AROM grossly WFL, strength deficit L>R)   RUE Strength   RUE Overall Strength Deficits  (3+/5)   LUE Assessment   LUE Assessment X  (AROM grossly WFL, strength deficit L>R)   LUE Strength   LUE Overall Strength Deficits  (3/5)   Hand Function   Gross Motor Coordination Functional   Fine Motor Coordination Impaired   Hand Function Comments neuropathy to both hands and feet   Sensation   Light Touch Partial deficits in the RUE;Partial deficits in the LUE   Vision-Basic Assessment   Current Vision Wears glasses for distance only   Psychosocial   Psychosocial (WDL) WDL   Cognition   Overall Cognitive Status WFL   Arousal/Participation Alert; Responsive; Cooperative   Attention Within functional limits   Orientation Level Oriented X4   Memory Within functional limits   Following Commands Follows all commands and directions without difficulty   Assessment   Limitation Decreased ADL status   Prognosis Good   Assessment Patient is a 61 y o  male seen for OT evaluation s/p admit to [address] [facility] on 2/7/2023 w/Sepsis [facility])  Commorbidities affecting patient's functional performance at time of assessment include: Colon Ca metastasized to liver, Abnormal CT Scan, Acute on Chronic kidney failure, Type 2 DM,Hyponatremia, hyperkalemia, hypertension  Orders placed for OT evaluation and treatment  Performed at least two patient identifiers during session including name and wristband  Prior to admission, Patient reported that upon completeing Acute Rehab at the [facility], he was ambulatory with a walker  Had 2 falls in the last 3 weeks at home and reported that he has deteriorated in all areas of mobility  Patient lives in a multi level house, 1st floor set up with 3 ADRIAN  Personal factors affecting patient at time of initial evaluation include: steps to enter, decreased initiation and engagement, difficulty performing ADLs and difficulty performing IADLs  Upon evaluation, patient requires minimal  assist for UB ADLs, maximal assist for LB ADLs  Occupational performance is affected by the following deficits: decreased functional use of BUEs, decreased muscle strength, degenerative arthritic joint changes, impaired fine motor coordination, dynamic sit/ stand balance deficit with poor standing tolerance time for self care and functional mobility, decreased activity tolerance, increased pain and postural control and postural alignment deficit, requiring external assistance to complete transitional movements    Patient to benefit from continued Occupational Therapy treatment while in the hospital to address deficits as defined above and maximize level of functional independence with ADLs and functional mobility  Occupational Performance areas to address include: bathing/ shower, dressing, toilet hygiene, transfer to all surfaces, functional mobility, emergency response, health maintenance, IADLs: safety procedures and Leisure Participation  From OT standpoint, recommendation at time of d/c would be Short Term Rehab  Plan   Treatment Interventions ADL retraining;Functional transfer training;UE strengthening/ROM; Endurance training;Patient/family training; Compensatory technique education;Continued evaluation; Energy conservation; Activityengagement   Goal Expiration Date 02/22/23   OT Frequency 3-5x/wk   Recommendation   OT Discharge Recommendation Post acute rehabilitation services   AM-PAC Daily Activity Inpatient   Lower Body Dressing 2   Bathing 2   Toileting 2   Upper Body Dressing 3   Grooming 3   Eating 4   Daily Activity Raw Score 16   Daily Activity Standardized Score (Calc for Raw Score >=11) 35 96   AM-PAC Applied Cognition Inpatient   Following a Speech/Presentation 4   Understanding Ordinary Conversation 4   Taking Medications 4   Remembering Where Things Are Placed or Put Away 4   Remembering List of 4-5 Errands 4   Taking Care of Complicated Tasks 4   Applied Cognition Raw Score 24   Applied Cognition Standardized Score 62 21   Barthel Index   Feeding 10   Bathing 0   Grooming Score 0   Dressing Score 5   Bladder Score 10   Bowels Score 0   Toilet Use Score 0   Transfers (Bed/Chair) Score 5   Mobility (Level Surface) Score 0   Stairs Score 0   Barthel Index Score 30               1 - Patient will verbalize and demonstrate use of energy conservation/ deep breathing technique and work simplification skills during functional activity with no verbal cues  2 - Patient will verbalize and demonstrate good body mechanics and joint protection techniques during  ADLs/ IADLs with no verbal cues      3 - Patient will increase OOB/ sitting tolerance to 2-4 hours per day for increased participation in self care and leisure tasks with no s/s of exertion  4 - Patient will increase standing tolerance time to 5  minutes with unilateral UE support to complete sink level ADLs@ mod I level  5 - Patient will increase sitting tolerance at edge of bed to 20 minutes to complete UB ADLs @ set up assist level  6 - Patient will transfer bed to Chair / toilet at Set up assist level with AD as indicated  7 - Patient will complete UB ADLs with set up assist      8 - Patient will complete LB ADLs with min assist with the use of adaptive equipment       9 - Patient will complete toileting hygiene with set up assist/ supervision for thoroughness    10 - Patient/ Family  will demonstrate competency with UE Home Exercise Program

## 2023-02-08 NOTE — ASSESSMENT & PLAN NOTE
Lab Results   Component Value Date    EGFR 27 02/07/2023    EGFR 72 01/16/2023    EGFR 74 01/14/2023    CREATININE 2 52 (H) 02/07/2023    CREATININE 1 12 01/16/2023    CREATININE 1 09 01/14/2023     · Creatinine 2 5 to; baseline 1 0, improved to 1 93  · Suspecting possible multifactorial cause in setting of dehydration and sepsis  · Avoid nephrotoxic agents  · Continue with IV fluids  · AM BMP

## 2023-02-08 NOTE — H&P
[address] 1965, 61 y o  male MRN: [id number]  Unit/Bed#: ED 28 Encounter: [id number]  Primary Care Provider: Aliyah Mccoy MD   Date and time admitted to hospital: 2/7/2023  3:01 PM    * Sepsis [facility])  Assessment & Plan  Patient with extensive abdominal surgical history, reports was recently discharged from rehab after prolonged hospitalization several weeks ago  Since then he has had increasing fatigue and generalized weakness, which has been particularly worse over the past several days  Over this timeframe he is also noted the development of foul-smelling drainage and increasing pain from his chronic abdominal wound  Otherwise he reports some mild SOB, and is without other symptoms/complaint at this time      /70   Pulse (!) 116   Temp 98 7 °F (37 1 °C) (Oral)   Resp 22   SpO2 97%     · As evidenced by tachycardia, tachypnea, and leukocytosis  · In setting of purulent abdominal infection  · Procal 1 07  · Lactic intially 2 4; cleared after IVF in ED   · Hx of colon CA w/ extensive abd surgical hx as below   · Reports general decline over the past few days, weakness, abd pain, and foul-smelling odor from chronic midline abdominal wound  · Patient has several abdominal drains in place, with CT showing improvement in the fluid collections at the left liver lobe, perisplenic area, and the left retroperitoneum inferior to the spleen  · Otherwise does not note new fluid collection  · ED gave full 30cc/kg IVF bolus + IV zosyn  · Continue IV zosyn and gentle IVF hydration overnight   · Trend WBC; follow-up with pending infectious labs and cultures  · ID consulted; recommendations appreciated  · Patient had concerns about RUQ drain possibly coming loose; there is currently one-stitch in place that appears slightly loose; further eval in AM for possible resuturing in place     Colon cancer metastasized to liver [facility])  Assessment & Plan  · Follows w/ outpatient [facility] onc and palliative care  · Extensive abdominal surgical history (11/7 ex lap w/ section 3 liver resection, and development of left upper quadrant hematoma and suspected leak from transverse colon anastomosis complication; 07/99 right hemicolectomy; 11/17 re-exploration w/partial descending colectomy; 12/8 IR percutaneous cholecystostomy tube and hepatectomy abscess drainage; 12/20 IR drains placed for perisplenic abscess)  · Appears to have chronic elevation of alk phos in setting of liver mets  · See other issues as above  · Patient does not wish to follow with [place] oncology anymore as it is too far of a drive; [facility] (already follows with Dr Dasia Abebe)  · Continue outpatient f/u    Abnormal CT scan  Assessment & Plan  · CT shows interval decrease in bilateral pleural effusions, however multiple small bilateral lung nodules are seen which are concerning for possible metastatic lung disease  · Patient reports having prior known lung nodules  · Patient does have some complaints of SOB, however is oxygenating appropriately on room air; continue to monitor oxygenation  · Outpatient f/u    Acute on chronic kidney failure [facility])  Assessment & Plan  Lab Results   Component Value Date    EGFR 27 02/07/2023    EGFR 72 01/16/2023    EGFR 74 01/14/2023    CREATININE 2 52 (H) 02/07/2023    CREATININE 1 12 01/16/2023    CREATININE 1 09 01/14/2023     · Creatinine 2 5 to; baseline 1 0  · Suspecting possible multifactorial cause in setting of dehydration and sepsis  · Avoid nephrotoxic agents  · IVF hydration overnight  · AM BMP    Type 2 diabetes mellitus without complication, with long-term current use of insulin ([facility] [address] )  Assessment & Plan    Lab Results   Component Value Date    HGBA1C 8 0 (H) 09/26/2022     ·   · Continue home Lantus 8U qHS and humalog 4U TID w/meals   · 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  · Hypoglycemia protocol  · Diabetic diet Hyponatremia  Assessment & Plan  · Sodium 129 on ED presentation  · Improved to 132 after 3L IVF in ED  · Suspected to be in the setting of dehydration  · AM BMP recheck     Hyperkalemia  Assessment & Plan  · Potassium 5 9 on ED presentation  · 5 1 after 3L IVF bolus  · Suspected be in setting of ALEXY  · AM BMP recheck ordered     Benign essential hypertension  Assessment & Plan  · /81  · Continue prehospital HTN medications  · Monitor BP per unit protocol    VTE Pharmacologic Prophylaxis: VTE Score: 6 High Risk (Score >/= 5) - Pharmacological DVT Prophylaxis Ordered: heparin  Sequential Compression Devices Ordered  Code Status: Prior   Discussion with family: Updated  (wife) via phone  Anticipated Length of Stay: Patient will be admitted on an inpatient basis with an anticipated length of stay of greater than 2 midnights secondary to SEPSIS and ALEXY  Total Time for Visit, including Counseling / Coordination of Care: 90 minutes Greater than 50% of this total time spent on direct patient counseling and coordination of care  Chief Complaint: decline in health state    History of Present Illness:  Catracho Ledesma is a 61 y o  male with a PMH of colon CA w/extensive abd surgical hx, T2DM, CKD, HTN, and HLD who presents with decline in health state  Patient with extensive abdominal surgical history, reports was recently discharged from rehab after prolonged hospitalization several weeks ago  Since then he has had increasing fatigue and generalized weakness, which has been particularly worse over the past several days  Over this timeframe he is also noted the development of foul-smelling drainage and increasing pain from his chronic abdominal wound  Otherwise he reports some mild SOB, and is without other symptoms/complaint at this time  All questions answered at the bedside and the best my ability      Review of Systems:  Review of Systems   Constitutional: Positive for appetite change (decreased), chills and fatigue  Negative for activity change, diaphoresis and fever  HENT: Negative for congestion, ear pain, nosebleeds and trouble swallowing  Eyes: Negative for pain and visual disturbance  Respiratory: Negative for apnea, cough, chest tightness, shortness of breath and wheezing  Cardiovascular: Negative for chest pain, palpitations and leg swelling  Gastrointestinal: Positive for abdominal pain  Negative for abdominal distention, blood in stool, constipation, diarrhea, nausea and vomiting  Endocrine: Negative for cold intolerance, heat intolerance and polyuria  Genitourinary: Negative for difficulty urinating, dysuria, flank pain and hematuria  Musculoskeletal: Negative for arthralgias, neck pain and neck stiffness  Skin: Positive for wound  Negative for color change and rash  Neurological: Positive for weakness (Subjective generalized )  Negative for dizziness, tremors, syncope, light-headedness, numbness and headaches  Hematological: Negative for adenopathy  All other systems reviewed and are negative        Past Medical and Surgical History:   Past Medical History:   Diagnosis Date   • Abdominal pain 03/12/2022   • Acute renal failure ([place] [address] )     [id number] resolved   • Acute respiratory failure with hypoxia ([place] [address] ) 11/12/2022   • Bacteremia 11/12/2022   • Cancer ([place] [address] )    • Diabetes mellitus ([place] [address] )    • Enteritis 08/23/2016   • Flash pulmonary edema ([place] [address] ) 11/12/2022   • Gastroparesis due to DM ([place] [address] ) 08/23/2016   • GERD (gastroesophageal reflux disease)    • Hernia, ventral 08/04/2016   • Hyperlipidemia    • Hypertension    • Morbid obesity ([place] [address] ) 04/17/2018   • Postoperative visit 03/02/2022   • SIRS (systemic inflammatory response syndrome) ([place] [address] ) 03/12/2022   • Snoring    • Stage 3a chronic kidney disease ([place] [address] ) 02/19/2022       Past Surgical History:   Procedure Laterality Date   • COLONOSCOPY     • COLOSTOMY N/A 02/20/2022    Procedure: COLOSTOMY LOOP, diverting;  Surgeon: Brandee Jauregui MD;  Location: [facility];  Service: General   • ESOPHAGOGASTRODUODENOSCOPY N/A 08/24/2016    Procedure: ESOPHAGOGASTRODUODENOSCOPY (EGD); Surgeon: Toi Carter MD;  Location: [facility];   Service:    • EXPLORATORY LAPAROTOMY W/ BOWEL RESECTION N/A 11/16/2022    Procedure: LAPAROTOMY EXPLORATORY W/ BOWEL RESECTION- VAC PLACEMENT;  Surgeon: Alexis Gamez MD;  Location: [facility];  Service: Surgical Oncology   • EXPLORATORY LAPAROTOMY W/ BOWEL RESECTION N/A 11/17/2022    Procedure: LAPAROTOMY EXPLORATORY W/ BOWEL RESECTION;  Surgeon: Alexis Gamez MD;  Location: [facility];  Service: Surgical Oncology   • ILEOSTOMY N/A 11/17/2022    Procedure: ILEOSTOMY;  Surgeon: Alexis Gamez MD;  [facility]: [facility];  Service: Surgical Oncology   • ILEOSTOMY CLOSURE N/A 11/7/2022    Procedure: REVERSAL COLOSTOMY;  Surgeon: Lyle Cisneros MD;  Location: [facility];  Service: Surgical Oncology   • IR CHOLECYSTOSTOMY TUBE CHECK/CHANGE/REPOSITION/REINSERTION/UPSIZE  12/12/2022   • IR CHOLECYSTOSTOMY TUBE PLACEMENT  12/8/2022   • IR DRAINAGE TUBE CHECK AND/OR REMOVAL  1/3/2023   • IR DRAINAGE TUBE CHECK/CHANGE/REPOSITION/REINSERTION/UPSIZE  1/12/2023   • IR DRAINAGE TUBE CHECK/CHANGE/REPOSITION/REINSERTION/UPSIZE  1/31/2023   • IR DRAINAGE TUBE PLACEMENT  12/8/2022   • IR DRAINAGE TUBE PLACEMENT  12/20/2022   • IR PORT PLACEMENT  03/10/2022   • IR THORACENTESIS  1/12/2023   • KIDNEY STONE SURGERY     • LIVER BIOPSY LAPAROSCOPIC N/A 02/20/2022    Procedure: DIAGNOSTIC LAPAROSCOPIC LIVER BIOPSY, DIVERTING LOOP COLOSTOMY ;  Surgeon: Brandee Jauregui MD;  [facility]: [facility];  Service: General   • LIVER LOBECTOMY N/A 11/7/2022    Procedure: SEGMENT 3 LIVER RESECTION, ABLATION, INTRAOPERATIVE U/S OF LIVER, PERITONEAL BIOPSY;  Surgeon: Lyle Cisneros MD;  Location: [facility];  Service: Surgical Oncology   • RIGHT COLON RESECTION N/A 11/7/2022    Procedure: EX LAP, TRANVERSE COLON RESECTION;  Surgeon: Emelyn Parrish Catherine Raygoza MD;  Location: [facility];  Service: Surgical Oncology   • TONSILLECTOMY     • UMBILICAL HERNIA REPAIR LAPAROSCOPIC N/A 04/26/2019    Procedure: LAPAROSCOPIC UMBILICAL HERNIA REPAIR;  Surgeon: Henrik Morse MD;  Location: [facility];  Service: General   • VAC DRESSING APPLICATION N/A 53/28/6387    Procedure: APPLICATION VAC DRESSING ABDOMEN/TRUNK;  Surgeon: Rishi Mendez MD;  Location: [facility];  Service: Surgical Oncology       Meds/Allergies:  Prior to Admission medications    Medication Sig Start Date End Date Taking? Authorizing Provider   acetaminophen (TYLENOL) 325 mg tablet Take 1 tablet (325 mg total) by mouth every 4 (four) hours as needed for mild pain 1/16/23   GAURANG Keith   amLODIPine (NORVASC) 2 5 mg tablet Take 1 tablet (2 5 mg total) by mouth daily 2/2/23   Boom Patel MD   amoxicillin (AMOXIL) 500 MG tablet Take 1 tablet (500 mg total) by mouth 2 (two) times a day for 7 days 2/5/23 2/12/23  Nia Almaraz MD   ampicillin (PRINCIPEN) 500 mg capsule Take 1 capsule (500 mg total) by mouth 4 (four) times a day for 7 days 2/2/23 2/9/23  Nia Almaraz MD   aspirin 81 MG tablet Take 81 mg by mouth daily      Historical Provider, MD   atorvastatin (LIPITOR) 40 mg tablet Take 1 tablet (40 mg total) by mouth daily 1/20/23   Alan Cobos MD   Cholecalciferol (VITAMIN D3 PO) Take 400 Units by mouth daily    Historical Provider, MD   collagenase (SANTYL) ointment Apply topically daily To the abdominal incision area 1/20/23   Alan Cobos MD   Continuous Blood Gluc Sensor (FreeStyle Parrish 14 Day Sensor) MISC Use 1 each every 14 (fourteen) days 3/11/22   GAURANG Lala   DULoxetine (CYMBALTA) 30 mg delayed release capsule Take 1 capsule (30 mg total) by mouth daily Do not start before January 17, 2023 1/17/23   GAURANG Keith   Insulin Glargine Solostar (Lantus SoloStar) 100 UNIT/ML SOPN Inject 0 1 mL (10 Units total) under the skin daily at bedtime  Patient taking differently: Inject 8 Units under the skin daily at bedtime 1/16/23   GAURANG Keith   insulin lispro (HumaLOG KwikPen) 100 units/mL injection pen Inject 4 Units under the skin 3 (three) times a day with meals 1/20/23   Alan Cobos MD   Insulin Pen Needle (B-D UF III MINI PEN NEEDLES) 31G X 5 MM MISC Inject under the skin daily 5/13/22   Boom Patel MD   Insulin Pen Needle (BD Pen Needle Jessica 2nd Gen) 32G X 4 MM MISC For use with insulin pen  Pharmacy may dispense brand covered by insurance  1/16/23   GAURANG Keith   Insulin Pen Needle (BD Pen Needle Jessica 2nd Gen) 32G X 4 MM MISC For use with insulin pen  Pharmacy may dispense brand covered by insurance  1/16/23   GAURANG Keith   methocarbamol (ROBAXIN) 500 mg tablet Take 1 tablet (500 mg total) by mouth 2 (two) times a day 10/26/22   Boom Patel MD   Multiple Vitamins-Minerals (multivitamin with minerals) tablet Take 1 tablet by mouth daily    Historical Provider, MD   naloxone (NARCAN) 4 mg/0 1 mL nasal spray Administer 1 spray into a nostril  If no response after 2-3 minutes, give another dose in the other nostril using a new spray   1/30/23   GAURANG Tee   Needle, Disp, (HYPODERMIC NEEDLE) 23G X 1-1/2" MISC by Does not apply route once a week 5/10/18   Nataliya Cai PA-C   ondansetron (ZOFRAN-ODT) 4 mg disintegrating tablet Take 1 tablet (4 mg total) by mouth every 6 (six) hours as needed (nausea,vomiting) 1/16/23   GAURANG Metz   Ostomy Supplies MISC Use in the morning 2/23/22   Boom Patel MD   oxyCODONE (ROXICODONE) 10 MG TABS Take 1 tablet (10 mg total) by mouth every 6 (six) hours as needed for severe pain Max Daily Amount: 40 mg 1/30/23   GAURANG Tee   pantoprazole (PROTONIX) 40 mg tablet Take 1 tablet (40 mg total) by mouth 2 (two) times a day 1/20/23   Alan Cobos MD   simethicone (MYLICON) 80 mg chewable tablet Chew 1 tablet (80 mg total) 4 (four) times a day (with meals and at bedtime) 1/20/23   Miley Hastings MD   sodium chloride, PF, 0 9 % 10 mL by Intracatheter route daily 1/20/23   Miley Hastings MD   sulfamethoxazole-trimethoprim (BACTRIM DS) 800-160 mg per tablet Take 1 tablet by mouth every 12 (twelve) hours for 10 days 2/3/23 2/13/23  Rylan Holley MD   tamsulosin [facility]) 0 4 mg Take 1 capsule (0 4 mg total) by mouth daily with dinner 12/14/22   Nhung Lilly MD     I have reviewed home medications with patient personally  Allergies: Allergies   Allergen Reactions   • Shellfish-Derived Products - Food Allergy Anaphylaxis   • Erythromycin GI Intolerance       Social History:  Marital Status: /Civil Union   Occupation: N/A  Patient Pre-hospital Living Situation: Home  Patient Pre-hospital Level of Mobility: walks  Patient Pre-hospital Diet Restrictions: Diabetic  Substance Use History:   Social History     Substance and Sexual Activity   Alcohol Use Never     Social History     Tobacco Use   Smoking Status Never   Smokeless Tobacco Never     Social History     Substance and Sexual Activity   Drug Use No       Family History:  Family History   Problem Relation Age of Onset   • Diabetes Mother         mellitus   • Lung cancer Mother    • Coronary artery disease Father        Physical Exam:     Vitals:   Blood Pressure: 123/70 (02/07/23 2015)  Pulse: (!) 116 (02/07/23 2015)  Temperature: 98 7 °F (37 1 °C) (02/07/23 1800)  Temp Source: Oral (02/07/23 1800)  Respirations: 22 (02/07/23 2015)  SpO2: 97 % (02/07/23 2015)    Physical Exam  Vitals and nursing note reviewed  Constitutional:       General: He is not in acute distress  Appearance: Normal appearance  He is ill-appearing (Chronic)  HENT:      Head: Normocephalic and atraumatic        Right Ear: External ear normal       Left Ear: External ear normal       Nose: Nose normal       Mouth/Throat:      Mouth: Mucous membranes are moist    Eyes:      Pupils: Pupils are equal, round, and reactive to light  Cardiovascular:      Rate and Rhythm: Normal rate and regular rhythm  Pulses: Normal pulses  Heart sounds: Normal heart sounds  No murmur heard  Pulmonary:      Effort: Pulmonary effort is normal  No respiratory distress  Breath sounds: Normal breath sounds  No wheezing or rales  Chest:      Chest wall: No tenderness  Abdominal:      General: Bowel sounds are normal  There is no distension  Palpations: Abdomen is soft  There is no mass  Tenderness: There is abdominal tenderness (generalized)  There is no guarding  Comments: +Midline abdominal wound with foul-smelling purulent drainage    +Abdominal drains and ostomy bag in place, all appear to be draining appropriately   Musculoskeletal:         General: No swelling or tenderness  Cervical back: Normal range of motion and neck supple  No rigidity or tenderness  Right lower leg: No edema  Left lower leg: No edema  Skin:     General: Skin is warm and dry  Capillary Refill: Capillary refill takes less than 2 seconds  Findings: No lesion or rash  Neurological:      General: No focal deficit present  Mental Status: He is alert     Psychiatric:         Mood and Affect: Mood normal           Additional Data:     Lab Results:  Results from last 7 days   Lab Units 02/07/23  1530   WBC Thousand/uL 28 00*   HEMOGLOBIN g/dL 9 8*   HEMATOCRIT % 32 2*   PLATELETS Thousands/uL 669*     Results from last 7 days   Lab Units 02/07/23 2014 02/07/23  1530   SODIUM mmol/L 132* 129*   POTASSIUM mmol/L 5 1 5 9*   CHLORIDE mmol/L 101 98   CO2 mmol/L 16* 15*   BUN mg/dL 60* 69*   CREATININE mg/dL 2 20* 2 52*   ANION GAP mmol/L 15* 16*   CALCIUM mg/dL 8 8 9 4   ALBUMIN g/dL  --  1 7*   TOTAL BILIRUBIN mg/dL  --  0 84   ALK PHOS U/L  --  1,300*   ALT U/L  --  26   AST U/L  --  62*   GLUCOSE RANDOM mg/dL 222* 298*     Results from last 7 days   Lab Units 02/07/23  1530   INR  1 32* Results from last 7 days   Lab Units 02/07/23  1759 02/07/23  1530   LACTIC ACID mmol/L 1 2 2 4*   PROCALCITONIN ng/ml  --  1 07*       Lines/Drains:  Invasive Devices     Central Venous Catheter Line  Duration           Port A Cath 03/10/22 Right Chest 334 days          Peripheral Intravenous Line  Duration           Peripheral IV 02/07/23 Left;Proximal;Ventral (anterior) Forearm <1 day          Drain  Duration           Ileostomy LUQ 82 days    Cholecystostomy Tube 57 days    Abscess Drain Abdomen 26 days                Central Line:  Goal for removal: IR port in place           Imaging: Reviewed radiology reports from this admission including: CT chest/abd/pelvis  CT chest abdomen pelvis wo contrast   Final Result by Oskar Hernandez MD (02/07 1845)         1  Interval decrease in bilateral pleural effusions with development of multiple small bilateral lung nodules concerning for metastatic lung disease  2   Fluid collection at the level of the left lobe of the liver is smaller with resolution of the other perisplenic fluid collections and improvement in collections in the left retroperitoneum inferior to the spleen  The study was marked in [facility] for immediate notification  Workstation performed: [id number]             EKG and Other Studies Reviewed on Admission:   · EKG: EKG reviewed   ** Please Note: This note has been constructed using a voice recognition system   **

## 2023-02-08 NOTE — ASSESSMENT & PLAN NOTE
Patient with extensive abdominal surgical history, reports was recently discharged from rehab after prolonged hospitalization several weeks ago  Since then he has had increasing fatigue and generalized weakness, which has been particularly worse over the past several days  Over this timeframe he is also noted the development of foul-smelling drainage and increasing pain from his chronic abdominal wound  Otherwise he reports some mild SOB, and is without other symptoms/complaint at this time    · As evidenced by tachycardia, tachypnea, and leukocytosis  · In setting of purulent abdominal infection  · Procal 1 07  · Lactic intially 2 4; cleared after IVF in ED   · Hx of colon CA w/ extensive abd surgical hx as below   · Patient has several abdominal drains in place, with CT showing improvement in the fluid collections at the left liver lobe, perisplenic area, and the left retroperitoneum inferior to the spleen  · ED gave full 30cc/kg IVF bolus + IV zosyn  · Started on IV meropenem per ID  · Trend WBC; follow-up with pending infectious labs and cultures  · ID consulted; recommendations appreciated

## 2023-02-08 NOTE — ASSESSMENT & PLAN NOTE
Follows w/ outpatient [facility] onc and palliative care   Extensive abdominal surgical history (11/7 ex lap w/ section 3 liver resection, and development of left upper quadrant hematoma and suspected leak from transverse colon anastomosis complication; 10/51 right hemicolectomy; 11/17 re-exploration w/partial descending colectomy; 12/8 IR percutaneous cholecystostomy tube and hepatectomy abscess drainage; 12/20 IR drains placed for perisplenic abscess)  · Patient does not wish to follow with [facility] oncology anymore as it is too far of a drive; would like to establish with [place] [id number] oncology (already follows with Dr Raffi Bowens)  · Continue outpatient f/u

## 2023-02-08 NOTE — UTILIZATION REVIEW
Initial Clinical Review    Admission: Date/Time/Statement:   Admission Orders (From admission, onward)     Ordered        02/07/23 [address]  Once                      Orders Placed This Encounter   Procedures   • INPATIENT ADMISSION     Standing Status:   Standing     Number of Occurrences:   1     Order Specific Question:   Level of Care     Answer:   Med Surg [16]     Order Specific Question:   Estimated length of stay     Answer:   More than 2 Midnights     Order Specific Question:   Certification     Answer:   I certify that inpatient services are medically necessary for this patient for a duration of greater than two midnights  See H&P and MD Progress Notes for additional information about the patient's course of treatment  ED Arrival Information     Expected   -    Arrival   2/7/2023 14:06    Acuity   Urgent            Means of arrival   Wheelchair    Escorted by   Spouse    Service   Hospitalist    Admission type   Emergency            Arrival complaint   WEAKNESS (CANCER PT)           Chief Complaint   Patient presents with   • Abdominal Pain     Pt has a wound to abdomen after surgery for colon ca  Vomiting since Sunday  Discharged from hospital 3 weeks ago  Also reports generalized weakness and thinks he has a uti  Initial Presentation: 61 y o  male presented to ED from home as inpatient admission for sepsis  He was released from rehab a few weeks ago and was doing okay and now at this point has been progressively decompensating  Reports generalized weakness  Reports foul-smelling odor from his midline abdominal wound    Reports feeling short of breath as well  Patient has a history of metastatic colonic adenocarcinoma, recently discharged from prolonged surgical admission culminating in partial colectomy with diverting ileostomy c/b intra-abdominal abscesswho presented to [address] [place] [address] on 11/7 for an elective surgical procedure due to metastatic colon adenocarcinoma by Dr Chantal Alegria  Patient presented to hem/onc on 2/22 when CT of abdomen revealed transverse colonic apple core lesion and innumerable hepatic metastases  MRI revealed multiple hepatic metastasis with smaller lesions in left lobe with larger lesions on the right lobe  On 11/7, patient underwent exploratory laparotomy, segment 3 liver resection, ablation, intraoperative ultrasound of liver  This was complicated by a left upper quadrant hematoma, imaging showed suspected leak from transverse colon anastomosis  On 11/16 went to OR revealed left upper quadrant infected hematoma, defect in the transverse colon anastomosis with extravasation of succus  Massively dilated cecum requiring resection  He had a right hemicolectomy, left in discontinuity and temporary abdominal closure device was placed  On 11/17, he underwent re-exploration, partial descending colectomy, primary colonic anastomosis created with diverting loop ileostomy and midline wound VAC placement  He completed a prolonged course of IV antibiotics for ESBL bacteremia due to intra-abdominal abscess  On 12/3, patient was discharged to SNF for rehab  Patient was transferred back to hospital on 12/4 due to abdominal pain  Abdominal incision was with noted yellow drainage  CT chest/abdomen/pelvis showed abdominal abscess and distended gallbladder  ID was consulted, and patient was continued on IV antibiotics (plan for 7 day course through 12/15)  Patient was taken to IR and underwent percutaneous cholecystostomy tube insertion and hepatectomy abscess drainage  On exam There is a large wound over the anterior abdominal wall which was foul-smelling and purulent   With elevated white blood cell count elevated lactic acid  Will need antibiotic therapy  Additionally acute kidney injury          Plan consult ID, Pastoral care and surgery   IVF IV antibiotics and supportive care    Date: 02-08-23 bedside debridement done continue IVF and supportive care     General Surgery consult 1   Interval decrease in bilateral pleural effusions with development of multiple small bilateral lung nodules concerning for metastatic lung disease  2   Fluid collection at the level of the left lobe of the liver is smaller with resolution of the other perisplenic fluid collections and improvement in collections in the left retroperitoneum inferior to the spleen  Plan:  • Bedside debridement today for midline surgical wound  Description of tissue removed: Necrotic, slough tissue from base of wound  Appearance and size of wound: 10x3x0 5  Wound base pink with white/gray slough, necrotic tissue  Depth of the debridement (depth, layer exposed, appearance of underlying tissue): Subcutaneous, debrided down to wound base with minimal bleeding, base pink and healthy tissue with minimal necrotic tissue remaining  • Wet-to-dry dressing changes daily  • Okay to continue regular diet  • As needed pain medication and antiemetics  • Encourage ambulation 3 times daily  • DVT prophylaxis: Heparin  • Continue home medications as prescribed          ID Consult:  1  Systemic inflammatory response syndrome  Leukocytosis and tachycardia  Present on admission  Possible sepsis without a well-defined source  The patient CT chest abdomen and pelvis does not reveal any new source of sepsis  Urinalysis nondiagnostic for UTI  The abdominal wound does not appear to be overtly infected  Unclear significance of the extremely high alkaline phosphatase level with the total bilirubin normal and the remainder of the transaminases not proportionally high    Consideration for the possibility of bacteremia   -Discontinue Zosyn  -We will treat with meropenem 1 g IV every 12 hours for now awaiting additional data  -Follow-up blood cultures  -Surgical evaluation  -Check right upper quadrant ultrasound  -Recheck CBC with differential, CMP, procalcitonin level  -Supportive care      ED Triage Vitals Temperature Pulse Respirations Blood Pressure SpO2   02/07/23 1415 02/07/23 1415 02/07/23 1415 02/07/23 1415 02/07/23 1415   98 9 °F (37 2 °C) (!) 122 20 111/69 100 %      Temp Source Heart Rate Source Patient Position - Orthostatic VS BP Location FiO2 (%)   02/07/23 1415 02/07/23 1630 02/07/23 1543 02/07/23 1630 --   Tympanic Monitor Sitting Left arm       Pain Score       02/07/23 2223       No Pain          Wt Readings from Last 1 Encounters:   02/07/23 88 5 kg (195 lb)     Additional Vital Signs:     Date/Time Temp Pulse Resp BP MAP (mmHg) SpO2 O2 Device Patient Position - Orthostatic VS   02/08/23 10:08:39 -- 101 -- 128/83 98 97 % -- --   02/08/23 07:31:24 98 1 °F (36 7 °C) 96 18 131/83 99 98 % -- --   02/07/23 22:23:41 97 6 °F (36 4 °C) 97 14 118/73 88 98 % -- --   02/07/23 2015 -- 116 Abnormal  22 123/70 91 97 % None (Room air) Lying   02/07/23 1856 -- 102 24 Abnormal  144/81 -- 99 % None (Room air) Lying   02/07/23 1830 -- 99 17 144/84 109 99 % -- Lying   02/07/23 1800 98 7 °F (37 1 °C) 94 21 144/86 109 99 % None (Room air) Sitting   02/07/23 1730 -- 93 21 152/90 115 99 % -- Sitting   02/07/23 1700 -- 96 18 145/86 110 100 % None (Room air) Sitting   02/07/23 1630 -- 98 20 149/84 111 100 % -- Lying   02/07/23 1543 -- 102 28 Abnormal  129/82 101 100 % None (Room air) Sitting       Pertinent Labs/Diagnostic Test Results:   CT chest abdomen pelvis wo contrast    (02/07 1845)      1  Interval decrease in bilateral pleural effusions with development of multiple small bilateral lung nodules concerning for metastatic lung disease  2   Fluid collection at the level of the left lobe of the liver is smaller with resolution of the other perisplenic fluid collections and improvement in collections in the left retroperitoneum inferior to the spleen  The study was marked in [facility] for immediate notification     US right upper quadrant    (Results Pending)     Results from last 7 days   Lab Units 02/07/23  4121 SARS-COV-2  Negative     Results from last 7 days   Lab Units 02/08/23  0337 02/07/23  1530   WBC Thousand/uL 21 75* 28 00*   HEMOGLOBIN g/dL 8 0* 9 8*   HEMATOCRIT % 25 7* 32 2*   PLATELETS Thousands/uL 448* 669*   NEUTROS ABS Thousands/µL 17 95*  --          Results from last 7 days   Lab Units 02/08/23  0337 02/07/23 2014 02/07/23  1530   SODIUM mmol/L 134* 132* 129*   POTASSIUM mmol/L 4 8 5 1 5 9*   CHLORIDE mmol/L 102 101 98   CO2 mmol/L 21 16* 15*   ANION GAP mmol/L 11 15* 16*   BUN mg/dL 53* 60* 69*   CREATININE mg/dL 1 93* 2 20* 2 52*   EGFR ml/min/1 73sq m 37 31 27   CALCIUM mg/dL 8 6 8 8 9 4     Results from last 7 days   Lab Units 02/08/23  0337 02/07/23  1530   AST U/L 61* 62*   ALT U/L 20 26   ALK PHOS U/L 1,043* 1,300*   TOTAL PROTEIN g/dL 7 8 9 8*   ALBUMIN g/dL 1 3* 1 7*   TOTAL BILIRUBIN mg/dL 0 75 0 84     Results from last 7 days   Lab Units 02/08/23  1112 02/08/23  0751 02/07/23  2221   POC GLUCOSE mg/dl 147* 123 179*     Results from last 7 days   Lab Units 02/08/23  0337 02/07/23 2014 02/07/23  1530   GLUCOSE RANDOM mg/dL 124 222* 298*     Results from last 7 days   Lab Units 02/07/23  1530   PROTIME seconds 16 1*   INR  1 32*         Results from last 7 days   Lab Units 02/08/23  0337 02/07/23  1530   PROCALCITONIN ng/ml 1 37* 1 07*     Results from last 7 days   Lab Units 02/07/23  1759 02/07/23  1530   LACTIC ACID mmol/L 1 2 2 4*       Results from last 7 days   Lab Units 02/07/23  1805   CLARITY UA  Clear   COLOR UA  Light Yellow   SPEC GRAV UA  1 012   PH UA  5 0   GLUCOSE UA mg/dl Negative   KETONES UA mg/dl Negative   BLOOD UA  Negative   PROTEIN UA mg/dl 30 (1+)*   NITRITE UA  Negative   BILIRUBIN UA  Negative   UROBILINOGEN UA (BE) mg/dl <2 0   LEUKOCYTES UA  Negative   WBC UA /hpf 4-10*   RBC UA /hpf 1-2   BACTERIA UA /hpf None Seen   EPITHELIAL CELLS WET PREP /hpf None Seen     Results from last 7 days   Lab Units 02/07/23  1530   INFLUENZA A PCR  Negative   INFLUENZA B PCR Negative   RSV PCR  Negative         Results from last 7 days   Lab Units 02/07/23  1759 02/07/23  1530   BLOOD CULTURE   --  Received in Microbiology Lab  Culture in Progress  Received in Microbiology Lab  Culture in Progress     GRAM STAIN RESULT  3+ Gram positive rods*  1+ Gram positive cocci in chains*  Rare Disintegrating polys*  --    WOUND CULTURE  Culture too young- will reincubate  --                ED Treatment:   Medication Administration from 02/07/2023 1406 to 02/07/2023 2213       Date/Time Order Dose Route Action     02/07/2023 1530 EST sodium chloride (PF) 0 9 % injection 3 mL 3 mL Intravenous Given     02/07/2023 1530 EST multi-electrolyte (PLASMALYTE-A/ISOLYTE-S PH 7 4) IV solution 1,000 mL 1,000 mL Intravenous New Bag     02/07/2023 1644 EST multi-electrolyte (PLASMALYTE-A/ISOLYTE-S PH 7 4) IV solution 1,000 mL 1,000 mL Intravenous New Bag     02/07/2023 1754 EST multi-electrolyte (PLASMALYTE-A/ISOLYTE-S PH 7 4) IV solution 1,000 mL 1,000 mL Intravenous New Bag     02/07/2023 1541 EST ondansetron (ZOFRAN) injection 4 mg 4 mg Intravenous Given     02/07/2023 1739 EST piperacillin-tazobactam (ZOSYN) 3 375 g in sodium chloride 0 9 % 100 mL IVPB 3 375 g Intravenous New Bag     02/07/2023 2011 EST multi-electrolyte (PLASMALYTE-A/ISOLYTE-S PH 7 4) IV solution 75 mL/hr Intravenous New Bag     02/07/2023 2119 EST insulin glargine (LANTUS) subcutaneous injection 8 Units 0 08 mL 8 Units Subcutaneous Given     02/07/2023 2118 EST methocarbamol (ROBAXIN) tablet 500 mg 500 mg Oral Given     02/07/2023 2011 EST oxyCODONE (ROXICODONE) immediate release tablet 10 mg 10 mg Oral Given     02/07/2023 2011 EST pantoprazole (PROTONIX) EC tablet 40 mg 40 mg Oral Given     02/07/2023 2118 EST simethicone (MYLICON) chewable tablet 80 mg 80 mg Oral Given     02/07/2023 2117 EST promethazine (PHENERGAN) injection 25 mg 25 mg Intramuscular Given        Past Medical History:   Diagnosis Date   • Abdominal pain 03/12/2022 • Acute renal failure ([place] [address] )     [id number] resolved   • Acute respiratory failure with hypoxia ([place] [address] ) 11/12/2022   • Bacteremia 11/12/2022   • Cancer ([place] [address] )    • Diabetes mellitus ([place] [address] )    • Enteritis 08/23/2016   • Flash pulmonary edema ([place] [address] ) 11/12/2022   • Gastroparesis due to DM ([place] [address] ) 08/23/2016   • GERD (gastroesophageal reflux disease)    • Hernia, ventral 08/04/2016   • Hyperlipidemia    • Hypertension    • Morbid obesity ([place] [address] ) 04/17/2018   • Postoperative visit 03/02/2022   • SIRS (systemic inflammatory response syndrome) ([place] [address] ) 03/12/2022   • Snoring    • Stage 3a chronic kidney disease ([place] [address] ) 02/19/2022     Present on Admission:  • Acute on chronic kidney failure ([place] [address] )  • Colon cancer metastasized to liver ([place] [address] )  • Sepsis ([place] [address] )  • Benign essential hypertension      Admitting Diagnosis: Abdominal pain [R10 9]  Wound infection [T14  8XXA, L08 9]  Acute kidney injury ([place] [address] ) [N17 9]  Sepsis, due to unspecified organism, unspecified whether acute organ dysfunction present ([place] [address] ) [A41 9]  Age/Sex: 61 y o  male     Admission Orders:  Continuous cardio-pulmonary and pulse ox  Blood sugars ac and hs  PT/OT  SCD    Scheduled Medications:  amLODIPine, 2 5 mg, Oral, Daily  aspirin, 81 mg, Oral, Daily  atorvastatin, 40 mg, Oral, Daily  DULoxetine, 30 mg, Oral, Daily  heparin (porcine), 5,000 Units, Subcutaneous, Q8H [facility]  insulin glargine, 5 Units, Subcutaneous, HS  insulin lispro, 1-6 Units, Subcutaneous, TID AC  insulin lispro, 1-6 Units, Subcutaneous, HS  insulin lispro, 4 Units, Subcutaneous, TID With Meals  meropenem, 1,000 mg, Intravenous, Q12H  pantoprazole, 40 mg, Oral, BID  simethicone, 80 mg, Oral, 4x Daily (with meals and at bedtime)  tamsulosin, 0 4 mg, Oral, Daily With Dinner      Continuous IV Infusions:  multi-electrolyte, 75 mL/hr, Intravenous, Continuous      PRN Meds:  acetaminophen, 650 mg, Oral, Q4H PRN  HYDROmorphone, 0 2 mg, Intravenous, Q4H PRN  influenza vaccine, 0 5 mL, Intramuscular, Prior to discharge  methocarbamol, 500 mg, Oral, BID PRN  ondansetron, 4 mg, Intravenous, Q4H PRN  oxyCODONE, 5 mg, Oral, Q4H PRN  oxyCODONE, 2 5 mg, Oral, Q4H PRN  pneumococcal 13-valent conjugate vaccine, 0 5 mL, Intramuscular, Prior to discharge        IP CONSULT TO INFECTIOUS DISEASES  IP CONSULT TO PASTORAL CARE  IP CONSULT TO ACUTE CARE SURGERY    Network Utilization Review Department  ATTENTION: Please call with any questions or concerns to [phone] and carefully listen to the prompts so that you are directed to the right person  All voicemails are confidential   Bruna Jean all requests for admission clinical reviews, approved or denied determinations and any other requests to dedicated fax number below belonging to the campus where the patient is receiving treatment   List of dedicated fax numbers for the Facilities:  [address] DENIALS (Administrative/Medical Necessity) [phone]   [address] (Maternity/NICU/Pediatrics) [phone]   [address] [phone]   [facility] [phone]   [address] [place] [address] Nupur Xiang [facility] [phone]   [address] [place] Janee Lawler [place] [address] 815 [facility] [phone]

## 2023-02-08 NOTE — WOUND OSTOMY CARE
Progress Note - Wound   Rd Quintanilla 61 y o  male MRN: [id number]  Unit/Bed#: -01 Encounter: [id number]      Assessment:   Patient admitted due to sepsis  History of cancer, GERD, HLD, HTN, CKD, diabetes  Wound care consulted for mid line abdominal wound- general Surgery consulted as well  Patient agreeable to assessment, alert and oriented x4, continent of bladder, LUQ colostomy with pouch intact and no signs of leaking/washout, is an assist of 1 to turn for assessment, is an assist with care  General Surgery PA at bedside for debridement of midline abdominal surgical wound- General surgery will be following for midline wound  1  Bilateral sacrum, buttock, hips, elbows, and heels- skin is dry, intact, blanchable  2  LUQ ileostomy- Pouch is dry, intact, no signs of leaking  Patient states pouch was changed 2/6  Has moderate amount of liquid brown stool  Stoma assessed through pouch and appears budded and pink in color  3  Mid line abdominal wound- Wound is oval in shape, full-thickness, approx  40% yellow adhered moist slough and 60% beefy red and pink tissue, with small amount of sanguineous drainage during assessment  Tunnel of approx  1 cm noted at 6 o'clock  Nisa-wound is dry, intact, no redness  Educated patient on importance of frequent offloading of pressure via turning, repositioning, and weight-shifting  Verbalized understanding of plan of care  Wound care will sign off at this time, please re-consult if needed  Will place skin care recommendations as orders  Skin care Plan:  1-Protect sacrum w/Allevyn foam, bryan with P, change q3d and PRN, check skin q-shift  2-Turn/reposition q2h for pressure re-distribution on skin   3-Elevate heels to offload pressure  4-Moisturize skin daily with skin nourishing cream  5-Ehob cushion in chair when out of bed  6-Hydraguard to bilateral heels BID and PRN  7-Ileostomy: Change pouch every 3-4 days or if signs of leaking   Empty pouch when 1/3 full  Wound 11/07/22 Abdomen N/A (Active)   Wound Image   02/08/23 1259   Wound Description Beefy red;Yellow;Slough 02/08/23 1259   Nisa-wound Assessment Dry; Intact 02/08/23 1259   Wound Length (cm) 10 cm 02/08/23 1259   Wound Width (cm) 3 cm 02/08/23 1259   Wound Depth (cm) 0 5 cm 02/08/23 1259   Wound Surface Area (cm^2) 30 cm^2 02/08/23 1259   Wound Volume (cm^3) 15 cm^3 02/08/23 1259   Calculated Wound Volume (cm^3) 15 cm^3 02/08/23 1259   Change in Wound Size % 96 94 02/08/23 1259   Tunneling 1 cm 02/08/23 1259   Tunneling in depth located at 6 02/08/23 1259   Undermining 0 02/08/23 1259   Drainage Amount Small 02/08/23 1259   Drainage Description Sanguineous 02/08/23 1259   Non-staged Wound Description Full thickness 02/08/23 1259   Treatments Cleansed;Irrigation with NSS;Site care 02/08/23 1259   Dressing Other (Comment);ABD 02/08/23 1259   Wound packed? No 02/08/23 1259   Dressing Changed Changed 02/08/23 1259   Patient Tolerance Tolerated well 02/08/23 1259   Dressing Status Clean;Dry; Intact 02/08/23 1259     Wound Care will sign off  Call or Tigertext with any questions    Chely Foss BSN RN CWON  Wound and Ostomy care

## 2023-02-08 NOTE — PROGRESS NOTES
[address] [facility]  Progress Note - Cloteal Morton 1965, 61 y o  male MRN: [id number]  Unit/Bed#: -01 Encounter: [id number]  Primary Care Provider: Homer Sierra MD   Date and time admitted to hospital: 2/7/2023  3:01 PM    Hyponatremia  Assessment & Plan  · Sodium 129 on ED presentation, improving with IV fluids  · Suspected to be in the setting of dehydration  · AM BMP recheck     Hyperkalemia  Assessment & Plan  Potassium 5 9 on ED presentation, resolved  · Suspected be in setting of ALEXY  · AM BMP recheck ordered     Abnormal CT scan  Assessment & Plan  · CT shows interval decrease in bilateral pleural effusions, however multiple small bilateral lung nodules are seen which are concerning for possible metastatic lung disease  · Patient reports having prior known lung nodules  · Patient does have some complaints of SOB, however is oxygenating appropriately on room air; continue to monitor oxygenation  · Outpatient f/u    Acute on chronic kidney failure [facility])  Assessment & Plan  Lab Results   Component Value Date    EGFR 27 02/07/2023    EGFR 72 01/16/2023    EGFR 74 01/14/2023    CREATININE 2 52 (H) 02/07/2023    CREATININE 1 12 01/16/2023    CREATININE 1 09 01/14/2023     · Creatinine 2 5 to; baseline 1 0, improved to 1 93  · Suspecting possible multifactorial cause in setting of dehydration and sepsis  · Avoid nephrotoxic agents  · Continue with IV fluids  · AM BMP    Colon cancer metastasized to liver [facility])  Assessment & Plan  Follows w/ outpatient [facility] onc and palliative care   Extensive abdominal surgical history (11/7 ex lap w/ section 3 liver resection, and development of left upper quadrant hematoma and suspected leak from transverse colon anastomosis complication; 04/28 right hemicolectomy; 11/17 re-exploration w/partial descending colectomy; 12/8 IR percutaneous cholecystostomy tube and hepatectomy abscess drainage; 12/20 IR drains placed for perisplenic abscess)  · Patient does not wish to follow with [place] oncology anymore as it is too far of a drive; would like to establish with [facility] (already follows with Dr Ulises Hale)  · Continue outpatient f/u    Benign essential hypertension  Assessment & Plan  · BP stable  · Continue prehospital HTN medications  · Monitor BP per unit protocol    Type 2 diabetes mellitus without complication, with long-term current use of insulin ([address] )  Assessment & Plan    Lab Results   Component Value Date    HGBA1C 8 0 (H) 09/26/2022     ·   · Continue home Lantus 8U qHS and humalog 4U TID w/meals   · 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  · Hypoglycemia protocol  · Diabetic diet     * Sepsis [facility])  Assessment & Plan  Patient with extensive abdominal surgical history, reports was recently discharged from rehab after prolonged hospitalization several weeks ago  Since then he has had increasing fatigue and generalized weakness, which has been particularly worse over the past several days  Over this timeframe he is also noted the development of foul-smelling drainage and increasing pain from his chronic abdominal wound  Otherwise he reports some mild SOB, and is without other symptoms/complaint at this time    · As evidenced by tachycardia, tachypnea, and leukocytosis  · In setting of purulent abdominal infection  · Procal 1 07  · Lactic intially 2 4; cleared after IVF in ED   · Hx of colon CA w/ extensive abd surgical hx as below   · Patient has several abdominal drains in place, with CT showing improvement in the fluid collections at the left liver lobe, perisplenic area, and the left retroperitoneum inferior to the spleen  · ED gave full 30cc/kg IVF bolus + IV zosyn  · Started on IV meropenem per ID  · Trend WBC; follow-up with pending infectious labs and cultures  · ID consulted; recommendations appreciated            VTE Pharmacologic Prophylaxis: VTE Score: 6 Moderate Risk (Score 3-4) - Pharmacological DVT Prophylaxis Ordered: heparin  Patient Centered Rounds: I performed bedside rounds with nursing staff today  Discussions with Specialists or Other Care Team Provider: JERED Gonzales    Education and Discussions with Family / Patient: Attempted to update  (wife) via phone  Left voicemail  Time Spent for Care: 60 minutes  More than 50% of total time spent on counseling and coordination of care as described above  Current Length of Stay: 1 day(s)  Current Patient Status: Inpatient   Certification Statement: The patient will continue to require additional inpatient hospital stay due to sepsis  Discharge Plan: Anticipate discharge in 24-48 hrs to discharge location to be determined pending rehab evaluations  Code Status: Level 1 - Full Code    Subjective:   Patient reports ongoing nausea  Intermittent abdominal pain, rightly improving  Nuys fevers or chills  Continuous discharge from midline wound  Objective:     Vitals:   Temp (24hrs), Av 3 °F (36 8 °C), Min:97 6 °F (36 4 °C), Max:98 9 °F (37 2 °C)    Temp:  [97 6 °F (36 4 °C)-98 9 °F (37 2 °C)] 98 1 °F (36 7 °C)  HR:  [] 101  Resp:  [14-28] 18  BP: (111-152)/(69-90) 128/83  SpO2:  [97 %-100 %] 97 %  Body mass index is 27 98 kg/m²  Input and Output Summary (last 24 hours): Intake/Output Summary (Last 24 hours) at 2023 1102  Last data filed at 2023 0555  Gross per 24 hour   Intake --   Output 880 ml   Net -880 ml       Physical Exam:   Physical Exam  Vitals reviewed  Constitutional:       General: He is not in acute distress  Appearance: He is well-developed  He is not diaphoretic  HENT:      Head: Normocephalic and atraumatic  Mouth/Throat:      Pharynx: No oropharyngeal exudate  Eyes:      General: No scleral icterus  Extraocular Movements: Extraocular movements intact  Conjunctiva/sclera: Conjunctivae normal    Neck:      Vascular: No JVD  Trachea: No tracheal deviation     Cardiovascular:      Rate and Rhythm: Normal rate and regular rhythm  Heart sounds: No murmur heard  No friction rub  No gallop  Pulmonary:      Effort: Pulmonary effort is normal  No respiratory distress  Breath sounds: No stridor  No wheezing  Abdominal:      General: There is no distension  Palpations: Abdomen is soft  There is no mass  Tenderness: There is abdominal tenderness  There is no right CVA tenderness, left CVA tenderness, guarding or rebound  Comments: Generalized abdominal TTP  Midline wound with some purulent drainage noted  2 drains in place   Musculoskeletal:         General: Tenderness present  Normal range of motion  Right lower leg: No edema  Left lower leg: No edema  Skin:     General: Skin is warm and dry  Coloration: Skin is pale  Findings: No erythema  Neurological:      Mental Status: He is alert and oriented to person, place, and time  Psychiatric:         Behavior: Behavior normal          Thought Content: Thought content normal       Review of Systems   Constitutional: Positive for fatigue  Negative for activity change, appetite change and fever  HENT: Negative for congestion, postnasal drip, rhinorrhea and sinus pain  Eyes: Negative for photophobia, pain and discharge  Respiratory: Negative for apnea, cough, chest tightness, shortness of breath and wheezing  Cardiovascular: Negative for chest pain and leg swelling  Gastrointestinal: Negative for abdominal distention, abdominal pain, constipation, diarrhea, nausea and vomiting  Endocrine: Negative for polyuria  Genitourinary: Negative for decreased urine volume, difficulty urinating, dysuria, hematuria and urgency  Musculoskeletal: Negative for back pain, myalgias and neck pain  Skin: Positive for pallor and wound  Negative for rash  Neurological: Positive for weakness  Negative for dizziness, tremors, light-headedness and headaches  Hematological: Does not bruise/bleed easily  Psychiatric/Behavioral: Negative for agitation, behavioral problems and suicidal ideas  The patient is not nervous/anxious and is not hyperactive  Additional Data:     Labs:  Results from last 7 days   Lab Units 02/08/23  0337   WBC Thousand/uL 21 75*   HEMOGLOBIN g/dL 8 0*   HEMATOCRIT % 25 7*   PLATELETS Thousands/uL 448*   NEUTROS PCT % 83*   LYMPHS PCT % 9*   MONOS PCT % 7   EOS PCT % 0     Results from last 7 days   Lab Units 02/08/23  0337   SODIUM mmol/L 134*   POTASSIUM mmol/L 4 8   CHLORIDE mmol/L 102   CO2 mmol/L 21   BUN mg/dL 53*   CREATININE mg/dL 1 93*   ANION GAP mmol/L 11   CALCIUM mg/dL 8 6   ALBUMIN g/dL 1 3*   TOTAL BILIRUBIN mg/dL 0 75   ALK PHOS U/L 1,043*   ALT U/L 20   AST U/L 61*   GLUCOSE RANDOM mg/dL 124     Results from last 7 days   Lab Units 02/07/23  1530   INR  1 32*     Results from last 7 days   Lab Units 02/08/23  0751 02/07/23  2221   POC GLUCOSE mg/dl 123 179*         Results from last 7 days   Lab Units 02/08/23  0337 02/07/23  1759 02/07/23  1530   LACTIC ACID mmol/L  --  1 2 2 4*   PROCALCITONIN ng/ml 1 37*  --  1 07*       Lines/Drains:  Invasive Devices     Central Venous Catheter Line  Duration           Port A Cath 03/10/22 Right Chest 335 days          Peripheral Intravenous Line  Duration           Peripheral IV 02/07/23 Left;Proximal;Ventral (anterior) Forearm <1 day          Drain  Duration           Ileostomy LUQ 82 days    Cholecystostomy Tube 57 days    Abscess Drain Abdomen 26 days                Central Line:  Goal for removal: N/A - Chronic PICC             Imaging: No pertinent imaging reviewed  Recent Cultures (last 7 days):   Results from last 7 days   Lab Units 02/07/23  1759 02/07/23  1530   BLOOD CULTURE   --  Received in Microbiology Lab  Culture in Progress  Received in Microbiology Lab  Culture in Progress     GRAM STAIN RESULT  3+ Gram positive rods*  1+ Gram positive cocci in chains*  Rare Disintegrating polys*  --        Last 24 Hours Medication List:   Current Facility-Administered Medications   Medication Dose Route Frequency Provider Last Rate   • acetaminophen  650 mg Oral Q4H PRN Nelia Kenziepriscilla Thaoya, DO     • amLODIPine  2 5 mg Oral Daily [facility]     • aspirin  81 mg Oral Daily [place], [place]     • atorvastatin  40 mg Oral Daily [place], [place]     • DULoxetine  30 mg Oral Daily [place], [place]     • heparin (porcine)  5,000 Units Subcutaneous [facility] Leena NANETTE Jimenes     • HYDROmorphone  0 2 mg Intravenous Q4H PRN Nelia Kenzie Keeshaya, DO     • influenza vaccine  0 5 mL Intramuscular Prior to discharge Lupe Cotto MD     • insulin glargine  5 Units Subcutaneous HS Kiara Wang PA-C     • insulin lispro  1-6 Units Subcutaneous TID AC Seth Pastor [facility]NETTE     • insulin lispro  1-6 Units Subcutaneous HS Leenajuan miguel Jimenes PA-C     • insulin lispro  4 Units Subcutaneous TID With Meals Leena Jimenes PA-C     • meropenem  1,000 mg Intravenous Q12H Corinne Jacobson MD     • methocarbamol  500 mg Oral BID PRN Elena NANETTE Jimenes     • multi-electrolyte  75 mL/hr Intravenous Continuous [place], [place] 75 mL/hr (02/07/23 2011)   • ondansetron  4 mg Intravenous Q4H PRN Nelia Kenziepriscilla Thaoya, DO     • oxyCODONE  5 mg Oral Q4H PRN Nelia Kenziepriscilla Thaoya, DO     • oxyCODONE  2 5 mg Oral Q4H PRN Nelia Kenziepriscilla Thaoya, DO     • pantoprazole  40 mg Oral BID Leena Jimenes PA-C     • pneumococcal 13-valent conjugate vaccine  0 5 mL Intramuscular Prior to discharge Lupe Cotto MD     • simethicone  80 mg Oral 4x Daily (with meals and at bedtime) Leena Jimenes PA-C     • tamsulosin  0 4 mg Oral Daily With [address] [facility]          Today, Patient Was Seen By: Hallie Busby DO    **Please Note: This note may have been constructed using a voice recognition system  **

## 2023-02-08 NOTE — ASSESSMENT & PLAN NOTE
Patient with extensive abdominal surgical history, reports was recently discharged from rehab after prolonged hospitalization several weeks ago  Since then he has had increasing fatigue and generalized weakness, which has been particularly worse over the past several days  Over this timeframe he is also noted the development of foul-smelling drainage and increasing pain from his chronic abdominal wound  Otherwise he reports some mild SOB, and is without other symptoms/complaint at this time      /81 (BP Location: Left arm)   Pulse 102   Temp 98 7 °F (37 1 °C) (Oral)   Resp (!) 24   SpO2 99%     · As evidenced by tachycardia, tachypnea, and leukocytosis  · In setting of purulent abdominal infection  · Hx of colon CA w/ extensive abd surgical hx as below   · Recent discharge from rehab several weeks ago  · Reports general decline over the past few days, weakness, and foul-smelling odor from chronic midline abdominal wound  · Patient has several abdominal drains in place, with CT showing improvement in the fluid collections at the left liver lobe, perisplenic area, and the left retroperitoneum inferior to the spleen  · Otherwise does not note new fluid collection  · ED gave full 30cc/kg IVF bolus + IV zosyn  · Continue IV zosyn and gentle IVF hydration overnight   · Trend WBC; follow-up with pending infectious labs and cultures

## 2023-02-08 NOTE — ASSESSMENT & PLAN NOTE
Potassium 5 9 on ED presentation, resolved     · Suspected be in setting of ALEXY  · AM BMP recheck ordered

## 2023-02-08 NOTE — PLAN OF CARE
Problem: PHYSICAL THERAPY ADULT  Goal: Performs mobility at highest level of function for planned discharge setting  See evaluation for individualized goals  Description: Treatment/Interventions: Functional transfer training, Therapeutic exercise, Endurance training, Patient/family training, Bed mobility, Gait training, Spoke to nursing, Continued evaluation, OT  Equipment Recommended: Kathleen Moyer       See flowsheet documentation for full assessment, interventions and recommendations  Note: Prognosis: Fair  Problem List: Decreased strength, Decreased endurance, Impaired balance, Decreased mobility, Decreased safety awareness, Pain  Assessment: Pt is 61 y o  male seen for high-complexity PT evaluation on 2/8/2023 s/p admit to [id number] [facility] on 2/7/2023 w/ Sepsis ([facility] [address] )  PT was consulted to assess pt's functional mobility and d/c needs  Order placed for PT eval and tx, w/ up w/ A order  PTA, pt was living with family in a multi-level home with first floor set up and 3 ADRIAN  Pt reports assist required at baseline with ADLs, IADLs, and reports use of RW at baseline for mobility  At time of eval, patient required minAx2 for sup > sit, STS, and SPT with RW and able to take 3 steps to bedside chair minAx2  Upon evaluation, pt presenting with impaired functional mobility d/t decreased strength, decreased endurance, impaired balance, decreased mobility, decreased safety awareness, pain and activity intolerance  Pertinent PMHx and current co-morbidities affecting pt's physical performance at time of assessment include: acute on chronic kidney failure, colon cancer metastisized to liver, benign essential hypertension, sepsis   Personal factors affecting pt at time of eval include: inaccessible home environment, lives in multi story house, ambulating w/ assistive device, stairs to enter home, inability to ambulate household distances, inability to navigate level surfaces w/o external assistance, positive fall history and unable to perform physical activity  The following objective measures performed on IE also reveal limitations: AM-PAC 6-Clicks: 20/76  Pt's clinical presentation is currently unstable/unpredictable seen in pt's presentation of abnormal lab value(s), need for two person min assist w/ all phases of mobility when usually mobilizing independently, tolerance to only 3 feet of ambulation, abdominal pain impacting overall mobility status and ongoing medical assessment  Overall, pt's rehab potential and prognosis to return to PLOF is fair as impacted by objective findings, warranting pt to receive further skilled PT interventions to address identified impairments, activity limitation(s), and participation restriction(s)  Goal for patient is to return home  Pt to benefit from continued PT tx to address deficits as defined above and maximize level of functional independent mobility and consistency in order for pt to incresae safety and independence with functional mobility  From PT/mobility standpoint, recommendation at time of d/c would be post acute rehabilitation services pending progress in order to facilitate return to PLOF  Barriers to Discharge: Inaccessible home environment     PT Discharge Recommendation: Post acute rehabilitation services    See flowsheet documentation for full assessment     Jagdeep Santos; PT, DPT

## 2023-02-08 NOTE — ASSESSMENT & PLAN NOTE
Lab Results   Component Value Date    EGFR 27 02/07/2023    EGFR 72 01/16/2023    EGFR 74 01/14/2023    CREATININE 2 52 (H) 02/07/2023    CREATININE 1 12 01/16/2023    CREATININE 1 09 01/14/2023     · Creatinine 2 5 to; baseline 1 0  · Suspecting possible multifactorial cause in setting of dehydration and sepsis  · Avoid nephrotoxic agents  · IVF hydration overnight  · AM BMP

## 2023-02-08 NOTE — CONSULTS
attempted to visit patient per consult  Patient unavailable upon attempts  Spiritual care will remain available to support

## 2023-02-08 NOTE — PLAN OF CARE
Problem: OCCUPATIONAL THERAPY ADULT  Goal: Performs self-care activities at highest level of function for planned discharge setting  See evaluation for individualized goals  Description: Treatment Interventions: ADL retraining, Functional transfer training, UE strengthening/ROM, Endurance training, Patient/family training, Compensatory technique education, Continued evaluation, Energy conservation, Activityengagement          See flowsheet documentation for full assessment, interventions and recommendations  Note: Limitation: Decreased ADL status  Prognosis: Good  Assessment: Patient is a 61 y o  male seen for OT evaluation s/p admit to [id number] [facility] on 2/7/2023 w/Sepsis [facility])  Commorbidities affecting patient's functional performance at time of assessment include: Colon Ca metastasized to liver, Abnormal CT Scan, Acute on Chronic kidney failure, Type 2 DM,Hyponatremia, hyperkalemia, hypertension  Orders placed for OT evaluation and treatment  Performed at least two patient identifiers during session including name and wristband  Prior to admission, Patient reported that upon completeing Acute Rehab at the [facility], he was ambulatory with a walker  Had 2 falls in the last 3 weeks at home and reported that he has deteriorated in all areas of mobility  Patient lives in a multi level house, 1st floor set up with 3 ADRIAN  Personal factors affecting patient at time of initial evaluation include: steps to enter, decreased initiation and engagement, difficulty performing ADLs and difficulty performing IADLs  Upon evaluation, patient requires minimal  assist for UB ADLs, maximal assist for LB ADLs    Occupational performance is affected by the following deficits: decreased functional use of BUEs, decreased muscle strength, degenerative arthritic joint changes, impaired fine motor coordination, dynamic sit/ stand balance deficit with poor standing tolerance time for self care and functional mobility, decreased activity tolerance, increased pain and postural control and postural alignment deficit, requiring external assistance to complete transitional movements  Patient to benefit from continued Occupational Therapy treatment while in the hospital to address deficits as defined above and maximize level of functional independence with ADLs and functional mobility  Occupational Performance areas to address include: bathing/ shower, dressing, toilet hygiene, transfer to all surfaces, functional mobility, emergency response, health maintenance, IADLs: safety procedures and Leisure Participation  From OT standpoint, recommendation at time of d/c would be Short Term Rehab       OT Discharge Recommendation: Post acute rehabilitation services

## 2023-02-08 NOTE — ASSESSMENT & PLAN NOTE
· Sodium 129 on ED presentation, improving with IV fluids  · Suspected to be in the setting of dehydration  · AM BMP recheck

## 2023-02-08 NOTE — ASSESSMENT & PLAN NOTE
Lab Results   Component Value Date    HGBA1C 8 0 (H) 09/26/2022     ·   · Continue home Lantus 8U qHS  · 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  · Hypoglycemia protocol  · Diabetic diet

## 2023-02-09 PROBLEM — N20.0 NEPHROLITHIASIS: Status: ACTIVE | Noted: 2023-02-09

## 2023-02-09 LAB
ALBUMIN SERPL BCP-MCNC: 1.3 G/DL (ref 3.5–5)
ALP SERPL-CCNC: 925 U/L (ref 46–116)
ALT SERPL W P-5'-P-CCNC: 17 U/L (ref 12–78)
ANION GAP SERPL CALCULATED.3IONS-SCNC: 10 MMOL/L (ref 4–13)
AST SERPL W P-5'-P-CCNC: 59 U/L (ref 5–45)
BASOPHILS # BLD AUTO: 0.04 THOUSANDS/ÂΜL (ref 0–0.1)
BASOPHILS NFR BLD AUTO: 0 % (ref 0–1)
BILIRUB DIRECT SERPL-MCNC: 0.48 MG/DL (ref 0–0.2)
BILIRUB SERPL-MCNC: 0.68 MG/DL (ref 0.2–1)
BUN SERPL-MCNC: 33 MG/DL (ref 5–25)
CALCIUM SERPL-MCNC: 8.4 MG/DL (ref 8.3–10.1)
CHLORIDE SERPL-SCNC: 103 MMOL/L (ref 96–108)
CO2 SERPL-SCNC: 21 MMOL/L (ref 21–32)
CREAT SERPL-MCNC: 1.47 MG/DL (ref 0.6–1.3)
EOSINOPHIL # BLD AUTO: 0 THOUSAND/ÂΜL (ref 0–0.61)
EOSINOPHIL NFR BLD AUTO: 0 % (ref 0–6)
ERYTHROCYTE [DISTWIDTH] IN BLOOD BY AUTOMATED COUNT: 17.9 % (ref 11.6–15.1)
GFR SERPL CREATININE-BSD FRML MDRD: 51 ML/MIN/1.73SQ M
GLUCOSE SERPL-MCNC: 107 MG/DL (ref 65–140)
GLUCOSE SERPL-MCNC: 118 MG/DL (ref 65–140)
GLUCOSE SERPL-MCNC: 166 MG/DL (ref 65–140)
GLUCOSE SERPL-MCNC: 192 MG/DL (ref 65–140)
GLUCOSE SERPL-MCNC: 96 MG/DL (ref 65–140)
HCT VFR BLD AUTO: 24.9 % (ref 36.5–49.3)
HGB BLD-MCNC: 7.5 G/DL (ref 12–17)
IMM GRANULOCYTES # BLD AUTO: 0.22 THOUSAND/UL (ref 0–0.2)
IMM GRANULOCYTES NFR BLD AUTO: 1 % (ref 0–2)
LYMPHOCYTES # BLD AUTO: 1.43 THOUSANDS/ÂΜL (ref 0.6–4.47)
LYMPHOCYTES NFR BLD AUTO: 7 % (ref 14–44)
MCH RBC QN AUTO: 25.6 PG (ref 26.8–34.3)
MCHC RBC AUTO-ENTMCNC: 30.1 G/DL (ref 31.4–37.4)
MCV RBC AUTO: 85 FL (ref 82–98)
MONOCYTES # BLD AUTO: 1.45 THOUSAND/ÂΜL (ref 0.17–1.22)
MONOCYTES NFR BLD AUTO: 8 % (ref 4–12)
NEUTROPHILS # BLD AUTO: 16.25 THOUSANDS/ÂΜL (ref 1.85–7.62)
NEUTS SEG NFR BLD AUTO: 84 % (ref 43–75)
NRBC BLD AUTO-RTO: 0 /100 WBCS
PLATELET # BLD AUTO: 366 THOUSANDS/UL (ref 149–390)
PMV BLD AUTO: 9.1 FL (ref 8.9–12.7)
POTASSIUM SERPL-SCNC: 4.4 MMOL/L (ref 3.5–5.3)
PROT SERPL-MCNC: 7.4 G/DL (ref 6.4–8.4)
RBC # BLD AUTO: 2.93 MILLION/UL (ref 3.88–5.62)
SODIUM SERPL-SCNC: 134 MMOL/L (ref 135–147)
WBC # BLD AUTO: 19.39 THOUSAND/UL (ref 4.31–10.16)

## 2023-02-09 RX ORDER — LANOLIN ALCOHOL/MO/W.PET/CERES
6 CREAM (GRAM) TOPICAL
Status: DISCONTINUED | OUTPATIENT
Start: 2023-02-09 | End: 2023-02-24 | Stop reason: HOSPADM

## 2023-02-09 RX ORDER — SODIUM CHLORIDE 9 MG/ML
10 INJECTION INTRAVENOUS DAILY
Status: DISCONTINUED | OUTPATIENT
Start: 2023-02-09 | End: 2023-02-24 | Stop reason: HOSPADM

## 2023-02-09 RX ADMIN — PANTOPRAZOLE SODIUM 40 MG: 40 TABLET, DELAYED RELEASE ORAL at 17:48

## 2023-02-09 RX ADMIN — OXYCODONE HYDROCHLORIDE 5 MG: 5 TABLET ORAL at 22:31

## 2023-02-09 RX ADMIN — INSULIN LISPRO 4 UNITS: 100 INJECTION, SOLUTION INTRAVENOUS; SUBCUTANEOUS at 17:49

## 2023-02-09 RX ADMIN — MEROPENEM 1000 MG: 1 INJECTION, POWDER, FOR SOLUTION INTRAVENOUS at 10:24

## 2023-02-09 RX ADMIN — ATORVASTATIN CALCIUM 40 MG: 40 TABLET, FILM COATED ORAL at 08:35

## 2023-02-09 RX ADMIN — HEPARIN SODIUM 5000 UNITS: 5000 INJECTION INTRAVENOUS; SUBCUTANEOUS at 06:35

## 2023-02-09 RX ADMIN — SODIUM CHLORIDE, PRESERVATIVE FREE 10 ML: 5 INJECTION INTRAVENOUS at 17:51

## 2023-02-09 RX ADMIN — MEROPENEM 1000 MG: 1 INJECTION, POWDER, FOR SOLUTION INTRAVENOUS at 17:48

## 2023-02-09 RX ADMIN — INSULIN LISPRO 4 UNITS: 100 INJECTION, SOLUTION INTRAVENOUS; SUBCUTANEOUS at 11:36

## 2023-02-09 RX ADMIN — AMLODIPINE BESYLATE 2.5 MG: 2.5 TABLET ORAL at 08:35

## 2023-02-09 RX ADMIN — SIMETHICONE 80 MG: 80 TABLET, CHEWABLE ORAL at 17:48

## 2023-02-09 RX ADMIN — INSULIN LISPRO 2 UNITS: 100 INJECTION, SOLUTION INTRAVENOUS; SUBCUTANEOUS at 11:36

## 2023-02-09 RX ADMIN — Medication 6 MG: at 22:36

## 2023-02-09 RX ADMIN — HEPARIN SODIUM 5000 UNITS: 5000 INJECTION INTRAVENOUS; SUBCUTANEOUS at 14:00

## 2023-02-09 RX ADMIN — SIMETHICONE 80 MG: 80 TABLET, CHEWABLE ORAL at 22:35

## 2023-02-09 RX ADMIN — INSULIN LISPRO 4 UNITS: 100 INJECTION, SOLUTION INTRAVENOUS; SUBCUTANEOUS at 08:35

## 2023-02-09 RX ADMIN — SIMETHICONE 80 MG: 80 TABLET, CHEWABLE ORAL at 08:35

## 2023-02-09 RX ADMIN — ACETAMINOPHEN 650 MG: 325 TABLET, FILM COATED ORAL at 22:59

## 2023-02-09 RX ADMIN — SODIUM CHLORIDE, SODIUM GLUCONATE, SODIUM ACETATE, POTASSIUM CHLORIDE, MAGNESIUM CHLORIDE, SODIUM PHOSPHATE, DIBASIC, AND POTASSIUM PHOSPHATE 100 ML/HR: .53; .5; .37; .037; .03; .012; .00082 INJECTION, SOLUTION INTRAVENOUS at 14:01

## 2023-02-09 RX ADMIN — PANTOPRAZOLE SODIUM 40 MG: 40 TABLET, DELAYED RELEASE ORAL at 08:35

## 2023-02-09 RX ADMIN — TAMSULOSIN HYDROCHLORIDE 0.4 MG: 0.4 CAPSULE ORAL at 17:48

## 2023-02-09 RX ADMIN — SIMETHICONE 80 MG: 80 TABLET, CHEWABLE ORAL at 11:36

## 2023-02-09 RX ADMIN — INSULIN GLARGINE 5 UNITS: 100 INJECTION, SOLUTION SUBCUTANEOUS at 22:35

## 2023-02-09 RX ADMIN — DULOXETINE HYDROCHLORIDE 30 MG: 30 CAPSULE, DELAYED RELEASE ORAL at 08:35

## 2023-02-09 RX ADMIN — HEPARIN SODIUM 5000 UNITS: 5000 INJECTION INTRAVENOUS; SUBCUTANEOUS at 22:35

## 2023-02-09 RX ADMIN — ASPIRIN 81 MG 81 MG: 81 TABLET ORAL at 08:35

## 2023-02-09 RX ADMIN — INSULIN LISPRO 1 UNITS: 100 INJECTION, SOLUTION INTRAVENOUS; SUBCUTANEOUS at 22:35

## 2023-02-09 NOTE — ASSESSMENT & PLAN NOTE
RUQ US revealed 7 mm nonobstructing right intrarenal calculus  No hydronephrosis noted on CT a/p  Asymptomatic at this time     · Continue w/ IV fluids  · flomax  · Strain urine  · Intake and output

## 2023-02-09 NOTE — ASSESSMENT & PLAN NOTE
Lab Results   Component Value Date    EGFR 51 02/09/2023    EGFR 37 02/08/2023    EGFR 31 02/07/2023    CREATININE 1 47 (H) 02/09/2023    CREATININE 1 93 (H) 02/08/2023    CREATININE 2 20 (H) 02/07/2023     · Creatinine 2 5 to; baseline 1 0, improved to 1 47 today  · Suspecting possible multifactorial cause in setting of dehydration and sepsis  · Avoid nephrotoxic agents  · Continue with IV fluids  · AM BMP

## 2023-02-09 NOTE — PLAN OF CARE
Problem: PHYSICAL THERAPY ADULT  Goal: Performs mobility at highest level of function for planned discharge setting  See evaluation for individualized goals  Description: Treatment/Interventions: Functional transfer training, LE strengthening/ROM, Therapeutic exercise, Endurance training, Patient/family training, Bed mobility, Gait training, Spoke to nursing, OT, Family  Equipment Recommended: Jennifer Tate       See flowsheet documentation for full assessment, interventions and recommendations  2/9/2023 1216 by Jim Cohn PT  Outcome: Progressing  Note: Prognosis: Good  Problem List: Decreased strength, Decreased endurance, Impaired balance, Decreased mobility, Decreased skin integrity, Pain  Assessment: Chart reviewed  Patient was received supine in bed in NAD and agreeable to PT session  Today's PT treatment session consisted of therapeutic activity for facilitation of transitional movements and safe performance of correct technique for bed mobility and sit to stand transfers, therapeutic exercise to increase lower extremity muscle strength, and gait training to promote safe and functional ambulation on level surfaces  In comparison to the previous session the patient has made progress as evident by ability to perform all functional mobility with assist of one  Pt was also able to ambulate an increased distance with use of RW  When ambulating pt exhibits decreased farhan, decreased stride length, and decreased heel strike  Pt's ambulation distance was limited secondary to complaints of fatigue and dizziness, BP post ambulation 121/83  Pt reported a resolve in symptoms with a seated rest break  Pt tolerated all therapeutic exercise well without complaints  Overall, patient tolerated today's session well and continues to be making progress towards achieving his STG's  Patient's prognosis for achieving their STG's is good as evident by pt's motivation and good family support   PT intervention continues to be appropriate as the patient continues to be limited by pain, decreased lower extremity strength, impaired balance, decreased endurance, gait deviations, and decreased functional mobility  Continue to recommend STR  PT to continue to see patient in order to address the deficits listed above and provide interventions consistent with the POC in order to achieve STG's and optimize the patient's independence with functional mobility  Barriers to Discharge: Inaccessible home environment     PT Discharge Recommendation: Post acute rehabilitation services    See flowsheet documentation for full assessment

## 2023-02-09 NOTE — PROGRESS NOTES
Progress Note - Infectious Disease   Sandy Sepulveda 61 y o  male MRN: [id number]  Unit/Bed#: -01 Encounter: [id number]      Impression/Plan:  1  Systemic inflammatory response syndrome  Leukocytosis and tachycardia  Present on admission  Possible sepsis without a well-defined source  The patient CT chest abdomen and pelvis does not reveal any new source of sepsis  Urinalysis nondiagnostic for UTI  The abdominal wound has been debrided  Unclear significance of the extremely high alkaline phosphatase level with the total bilirubin normal and the remainder of the transaminases not proportionally high  Consideration for the possibility of bacteremia  Right upper quadrant ultrasound without biliary ductal dilatation  -Increase meropenem to 1 g IV every 8 hours with improved renal function  -Follow-up blood cultures and wound cultures and adjust antibiotics as needed  -Surgery follow-up  -Recheck CBC with differential, CMP, procalcitonin level  -Supportive care     2  Intra-abdominal abscesses  Status post IR drainage with drains remaining in place but with improved findings on repeat CT scan  Patient completed several weeks of intravenous antibiotics  No evidence of a new abscess   -Antibiotics as above for now  -Surgery follow-up  -Serial exams     3  ESBL E  coli bacteremia/abscess formation  Status post prolonged course of intravenous antibiotics with clearance of the bacteremia  Abscess is improved as above  -Antibiotics as above for now  -Follow-up blood cultures     4  Abdominal wound  No overt cellulitis or purulence seen although there is some exudate  Possible mild wound infection that is status post debridement by surgery   -Local wound care  -Serial abdominal exams  -Surgery follow-up  -Follow-up wound cultures and adjust antibiotics as needed     5  Metastatic colon cancer  Status post extensive surgical resection with reexploration in the setting of complication     6    Acute kidney injury  Suspect secondary to prerenal issues  No other clear source appreciated  Renal function improving with IV fluid resuscitation   -Recheck BMP  -Volume management  -Dose adjust the antibiotics as needed    Have discussed the above antibiotic management plan with the primary service who agrees    Antibiotics:  Meropenem 2  Antibiotics 3    Subjective:  Patient has no fever, chills, sweats; no nausea, vomiting, diarrhea; no cough, shortness of breath; no increased pain  No new symptoms  He underwent debridement of the wound yesterday and overall feels better  Objective:  Vitals:  Temp:  [98 1 °F (36 7 °C)-98 6 °F (37 °C)] 98 1 °F (36 7 °C)  HR:  [] 102  Resp:  [18] 18  BP: (121-135)/(78-84) 121/83  SpO2:  [97 %-99 %] 97 %  Temp (24hrs), Av 3 °F (36 8 °C), Min:98 1 °F (36 7 °C), Max:98 6 °F (37 °C)  Current: Temperature: 98 1 °F (36 7 °C)    Physical Exam:   General Appearance:  Alert, interactive, nontoxic, no acute distress  Throat: Oropharynx moist without lesions  Lungs:   Clear to auscultation bilaterally; no wheezes, rhonchi or rales; respirations unlabored   Heart:  RRR; no murmur, rub or gallop   Abdomen:   Soft, non-tender, non-distended, positive bowel sounds  Biliary drain in place with output  VICTORINO drain in place  Ostomy with good output  Wound dressed with a dry dressing in place   Extremities: No clubbing, cyanosis or edema   Skin: No new rashes or lesions  No draining wounds noted         Labs, Imaging, & Other studies:   All pertinent labs and imaging studies were personally reviewed  Results from last 7 days   Lab Units 23  0635 23  1530   WBC Thousand/uL 19 39* 21 75* 28 00*   HEMOGLOBIN g/dL 7 5* 8 0* 9 8*   PLATELETS Thousands/uL 366 448* 669*     Results from last 7 days   Lab Units 23  0635 23  0337 23  1530   SODIUM mmol/L 134* 134* 132* 129*   POTASSIUM mmol/L 4 4 4 8 5 1 5 9*   CHLORIDE mmol/L 103 102 101 98   CO2 mmol/L 21 21 16* 15*   BUN mg/dL 33* 53* 60* 69*   CREATININE mg/dL 1 47* 1 93* 2 20* 2 52*   EGFR ml/min/1 73sq m 51 37 31 27   CALCIUM mg/dL 8 4 8 6 8 8 9 4   AST U/L 59* 61*  --  62*   ALT U/L 17 20  --  26   ALK PHOS U/L 925* 1,043*  --  1,300*     Results from last 7 days   Lab Units 02/07/23  1759 02/07/23  1530   BLOOD CULTURE   --  No Growth at 24 hrs  No Growth at 24 hrs  GRAM STAIN RESULT  3+ Gram positive rods*  1+ Gram positive cocci in chains*  Rare Disintegrating polys*  --    WOUND CULTURE  3+ Growth of Staphylococcus aureus*  3+ Growth of Non lactose fermenting gram negative jordi*  3+ Growth of  --      Results from last 7 days   Lab Units 02/08/23  0337 02/07/23  1530   PROCALCITONIN ng/ml 1 37* 1 07*       Right upper quadrant ultrasound-decompressed gallbladder with a cholecystotomy drain in place    No reported biliary ductal dilatation    Images personally reviewed and interpreted by me in PACS

## 2023-02-09 NOTE — ASSESSMENT & PLAN NOTE
Follows w/ outpatient [facility] onc and palliative care   Extensive abdominal surgical history (11/7 ex lap w/ section 3 liver resection, and development of left upper quadrant hematoma and suspected leak from transverse colon anastomosis complication; 09/23 right hemicolectomy; 11/17 re-exploration w/partial descending colectomy; 12/8 IR percutaneous cholecystostomy tube and hepatectomy abscess drainage; 12/20 IR drains placed for perisplenic abscess)  · Patient does not wish to follow with [place]ology anymore as it is too far of a drive; would like to establish with [facility] oncology (already follows with Dr Gem Sarmiento)  · Continue outpatient f/u

## 2023-02-09 NOTE — PLAN OF CARE
Problem: PAIN - ADULT  Goal: Verbalizes/displays adequate comfort level or baseline comfort level  Description: Interventions:  - Encourage patient to monitor pain and request assistance  - Assess pain using appropriate pain scale  - Administer analgesics based on type and severity of pain and evaluate response  - Implement non-pharmacological measures as appropriate and evaluate response  - Consider cultural and social influences on pain and pain management  - Notify physician/advanced practitioner if interventions unsuccessful or patient reports new pain  Outcome: Progressing     Problem: INFECTION - ADULT  Goal: Absence or prevention of progression during hospitalization  Description: INTERVENTIONS:  - Assess and monitor for signs and symptoms of infection  - Monitor lab/diagnostic results  - Monitor all insertion sites, i e  indwelling lines, tubes, and drains  - Monitor endotracheal if appropriate and nasal secretions for changes in amount and color  - [place] appropriate cooling/warming therapies per order  - Administer medications as ordered  - Instruct and encourage patient and family to use good hand hygiene technique  - Identify and instruct in appropriate isolation precautions for identified infection/condition  Outcome: Progressing  Goal: Absence of fever/infection during neutropenic period  Description: INTERVENTIONS:  - Monitor WBC    Outcome: Progressing     Problem: SAFETY ADULT  Goal: Patient will remain free of falls  Description: INTERVENTIONS:  - Educate patient/family on patient safety including physical limitations  - Instruct patient to call for assistance with activity   - Consult OT/PT to assist with strengthening/mobility   - Keep Call bell within reach  - Keep bed low and locked with side rails adjusted as appropriate  - Keep care items and personal belongings within reach  - Initiate and maintain comfort rounds  - Make Fall Risk Sign visible to staff  - Offer Toileting every  Hours, in advance of need  - Initiate/Maintain alarm  - Obtain necessary fall risk management equipment:   - Apply yellow socks and bracelet for high fall risk patients  - Consider moving patient to room near nurses station  Outcome: Progressing  Goal: Maintain or return to baseline ADL function  Description: INTERVENTIONS:  -  Assess patient's ability to carry out ADLs; assess patient's baseline for ADL function and identify physical deficits which impact ability to perform ADLs (bathing, care of mouth/teeth, toileting, grooming, dressing, etc )  - Assess/evaluate cause of self-care deficits   - Assess range of motion  - Assess patient's mobility; develop plan if impaired  - Assess patient's need for assistive devices and provide as appropriate  - Encourage maximum independence but intervene and supervise when necessary  - Involve family in performance of ADLs  - Assess for home care needs following discharge   - Consider OT consult to assist with ADL evaluation and planning for discharge  - Provide patient education as appropriate  Outcome: Progressing  Goal: Maintains/Returns to pre admission functional level  Description: INTERVENTIONS:  - Perform BMAT or MOVE assessment daily    - Set and communicate daily mobility goal to care team and patient/family/caregiver  - Collaborate with rehabilitation services on mobility goals if consulted  - Perform Range of Motion  times a day  - Reposition patient every  hours    - Dangle patient  times a day  - Stand patient  times a day  - Ambulate patient  times a day  - Out of bed to chair  times a day   - Out of bed for meals  times a day  - Out of bed for toileting  - Record patient progress and toleration of activity level   Outcome: Progressing     Problem: DISCHARGE PLANNING  Goal: Discharge to home or other facility with appropriate resources  Description: INTERVENTIONS:  - Identify barriers to discharge w/patient and caregiver  - Arrange for needed discharge resources and transportation as appropriate  - Identify discharge learning needs (meds, wound care, etc )  - Arrange for interpretive services to assist at discharge as needed  - Refer to Case Management Department for coordinating discharge planning if the patient needs post-hospital services based on physician/advanced practitioner order or complex needs related to functional status, cognitive ability, or social support system  Outcome: Progressing     Problem: Knowledge Deficit  Goal: Patient/family/caregiver demonstrates understanding of disease process, treatment plan, medications, and discharge instructions  Description: Complete learning assessment and assess knowledge base    Interventions:  - Provide teaching at level of understanding  - Provide teaching via preferred learning methods  Outcome: Progressing     Problem: SKIN/TISSUE INTEGRITY - ADULT  Goal: Skin Integrity remains intact(Skin Breakdown Prevention)  Description: Assess:  -Perform Michael assessment every   -Clean and moisturize skin every   -Inspect skin when repositioning, toileting, and assisting with ADLS  -Assess under medical devices such as  every   -Assess extremities for adequate circulation and sensation     Bed Management:  -Have minimal linens on bed & keep smooth, unwrinkled  -Change linens as needed when moist or perspiring  -Avoid sitting or lying in one position for more than  hours while in bed  -Keep HOB at degrees     Toileting:  -Offer bedside commode  -Assess for incontinence every   -Use incontinent care products after each incontinent episode such as     Activity:  -Mobilize patient  times a day  -Encourage activity and walks on unit  -Encourage or provide ROM exercises   -Turn and reposition patient every  Hours  -Use appropriate equipment to lift or move patient in bed  -Instruct/ Assist with weight shifting every  when out of bed in chair  -Consider limitation of chair time  hour intervals    Skin Care:  -Avoid use of baby powder, tape, friction and shearing, hot water or constrictive clothing  -Relieve pressure over bony prominences using   -Do not massage red bony areas    Next Steps:  -Teach patient strategies to minimize risks such as    -Consider consults to  interdisciplinary teams such as   Outcome: Progressing  Goal: Incision(s), wounds(s) or drain site(s) healing without S/S of infection  Description: INTERVENTIONS  - Assess and document dressing, incision, wound bed, drain sites and surrounding tissue  - Provide patient and family education  - Perform skin care/dressing changes every   Outcome: Progressing  Goal: Pressure injury heals and does not worsen  Description: Interventions:  - Implement low air loss mattress or specialty surface (Criteria met)  - Apply silicone foam dressing  - Instruct/assist with weight shifting every  minutes when in chair   - Limit chair time to  hour intervals  - Use special pressure reducing interventions such as  when in chair   - Apply fecal or urinary incontinence containment device   - Perform passive or active ROM every   - Turn and reposition patient & offload bony prominences every  hours   - Utilize friction reducing device or surface for transfers   - Consider consults to  interdisciplinary teams such as   - Use incontinent care products after each incontinent episode such as   - Consider nutrition services referral as needed  Outcome: Progressing     Problem: Prexisting or High Potential for Compromised Skin Integrity  Goal: Skin integrity is maintained or improved  Description: INTERVENTIONS:  - Identify patients at risk for skin breakdown  - Assess and monitor skin integrity  - Assess and monitor nutrition and hydration status  - Monitor labs   - Assess for incontinence   - Turn and reposition patient  - Assist with mobility/ambulation  - Relieve pressure over bony prominences  - Avoid friction and shearing  - Provide appropriate hygiene as needed including keeping skin clean and dry  - Evaluate need for skin moisturizer/barrier cream  - Collaborate with interdisciplinary team   - Patient/family teaching  - Consider wound care consult   Outcome: Progressing     Problem: MOBILITY - ADULT  Goal: Maintain or return to baseline ADL function  Description: INTERVENTIONS:  -  Assess patient's ability to carry out ADLs; assess patient's baseline for ADL function and identify physical deficits which impact ability to perform ADLs (bathing, care of mouth/teeth, toileting, grooming, dressing, etc )  - Assess/evaluate cause of self-care deficits   - Assess range of motion  - Assess patient's mobility; develop plan if impaired  - Assess patient's need for assistive devices and provide as appropriate  - Encourage maximum independence but intervene and supervise when necessary  - Involve family in performance of ADLs  - Assess for home care needs following discharge   - Consider OT consult to assist with ADL evaluation and planning for discharge  - Provide patient education as appropriate  Outcome: Progressing  Goal: Maintains/Returns to pre admission functional level  Description: INTERVENTIONS:  - Perform BMAT or MOVE assessment daily    - Set and communicate daily mobility goal to care team and patient/family/caregiver  - Collaborate with rehabilitation services on mobility goals if consulted  - Perform Range of Motion  times a day  - Reposition patient every  hours    - Dangle patient  times a day  - Stand patient  times a day  - Ambulate patient  times a day  - Out of bed to chair  times a day   - Out of bed for meals times a day  - Out of bed for toileting  - Record patient progress and toleration of activity level   Outcome: Progressing

## 2023-02-09 NOTE — PROGRESS NOTES
[address] [facility]  Progress Note - Severo Herr 1965, 61 y o  male MRN: [id number]  Unit/Bed#: -Shawn Encounter: [id number]  Primary Care Provider: Aliyah Mccoy MD   Date and time admitted to hospital: 2/7/2023  3:01 PM    Nephrolithiasis  Lungodora Arnold 148 revealed 7 mm nonobstructing right intrarenal calculus  No hydronephrosis noted on CT a/p  Asymptomatic at this time  · Continue w/ IV fluids  · flomax  · Strain urine  · Intake and output    Hyponatremia  Assessment & Plan  · Sodium 129 on ED presentation, improving with IV fluids  · Suspected to be in the setting of dehydration  · AM BMP recheck     Hyperkalemia  Assessment & Plan  Potassium 5 9 on ED presentation, resolved  · Suspected be in setting of ALEXY  · AM BMP recheck ordered     Abnormal CT scan  Assessment & Plan  · CT shows interval decrease in bilateral pleural effusions, however multiple small bilateral lung nodules are seen which are concerning for possible metastatic lung disease  · Patient reports having prior known lung nodules  · Patient does have some complaints of SOB, however is oxygenating appropriately on room air; continue to monitor oxygenation  · Outpatient f/u    Elevated alkaline phosphatase level  Assessment & Plan  Chronically elevated likely in setting of known hepatic metastasis  · RUQ US Decompressed gallbladder around a cholecystostomy tube, limiting evaluation  Hepatomegaly  Heterogeneous echotexture of the liver in this patient with known metastatic disease  7 mm nonobstructing right intrarenal calculus    · Monitor with am CMP    Acute on chronic kidney failure [facility])  Assessment & Plan  Lab Results   Component Value Date    EGFR 51 02/09/2023    EGFR 37 02/08/2023    EGFR 31 02/07/2023    CREATININE 1 47 (H) 02/09/2023    CREATININE 1 93 (H) 02/08/2023    CREATININE 2 20 (H) 02/07/2023     · Creatinine 2 5 to; baseline 1 0, improved to 1 47 today  · Suspecting possible multifactorial cause in setting of dehydration and sepsis  · Avoid nephrotoxic agents  · Continue with IV fluids  · AM BMP    Colon cancer metastasized to liver [facility])  Assessment & Plan  Follows w/ outpatient [facility] onc and palliative care  Extensive abdominal surgical history (11/7 ex lap w/ section 3 liver resection, and development of left upper quadrant hematoma and suspected leak from transverse colon anastomosis complication; 78/66 right hemicolectomy; 11/17 re-exploration w/partial descending colectomy; 12/8 IR percutaneous cholecystostomy tube and hepatectomy abscess drainage; 12/20 IR drains placed for perisplenic abscess)  · Patient does not wish to follow with [place] oncology anymore as it is too far of a drive; would like to establish with [facility] oncology (already follows with Dr Johanna Velásquez)  · Continue outpatient f/u    Benign essential hypertension  Assessment & Plan  BP stable  · Continue prehospital HTN medications  · Monitor BP per unit protocol    Type 2 diabetes mellitus without complication, with long-term current use of insulin (HCC)  Assessment & Plan    Lab Results   Component Value Date    HGBA1C 8 0 (H) 09/26/2022     ·   ·  home Lantus 8U qHS and humalog 4U TID w/meals   · Dose reduce to 5U qhs + 4 U TID   · 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  · Hypoglycemia protocol  · Diabetic diet     * Sepsis [facility])  Assessment & Plan  Patient with extensive abdominal surgical history, reports was recently discharged from rehab after prolonged hospitalization several weeks ago  Since then he has had increasing fatigue and generalized weakness, which has been particularly worse over the past several days  Over this timeframe he is also noted the development of foul-smelling drainage and increasing pain from his chronic abdominal wound  Otherwise he reports some mild SOB, and is without other symptoms/complaint at this time    · As evidenced by tachycardia, tachypnea, and leukocytosis  · In setting of purulent abdominal infection  · Procal 1 07  · Lactic intially 2 4; cleared after IVF in ED   · Hx of colon CA w/ extensive abd surgical hx as below   · Patient has several abdominal drains in place, with CT showing improvement in the fluid collections at the left liver lobe, perisplenic area, and the left retroperitoneum inferior to the spleen  · ED gave full 30cc/kg IVF bolus + IV zosyn  · Started on IV meropenem per ID  · Trend WBC; follow-up with pending infectious labs and cultures  · ID consulted; recommendations appreciated  · S/p bedside midline wound debridement + wet dressing 2/8 with general surgery          VTE Pharmacologic Prophylaxis: VTE Score: 6 Moderate Risk (Score 3-4) - Pharmacological DVT Prophylaxis Ordered: heparin  Patient Centered Rounds: I performed bedside rounds with nursing staff today  Discussions with Specialists or Other Care Team Provider: JERED HESS    Education and Discussions with Family / Patient: Updated  (wife) at bedside  Time Spent for Care: 45 minutes  More than 50% of total time spent on counseling and coordination of care as described above  Current Length of Stay: 2 day(s)  Current Patient Status: Inpatient   Certification Statement: The patient will continue to require additional inpatient hospital stay due to IV antibioitics  Discharge Plan: Anticipate discharge in 24-48 hrs to discharge location to be determined pending rehab evaluations  Code Status: Level 1 - Full Code    Subjective:   Pt states he is feeling much better, nausea resolved  No abdominal pain  Wife at bedside  Review of Systems   Constitutional: Negative for activity change, appetite change, fatigue and fever  HENT: Negative for congestion, postnasal drip, rhinorrhea and sinus pain  Eyes: Negative for photophobia, pain and discharge  Respiratory: Negative for apnea, cough, chest tightness, shortness of breath and wheezing      Cardiovascular: Negative for chest pain and leg swelling  Gastrointestinal: Negative for abdominal distention, abdominal pain, constipation, diarrhea, nausea and vomiting  Endocrine: Negative for polyuria  Genitourinary: Negative for decreased urine volume, difficulty urinating, dysuria, hematuria and urgency  Musculoskeletal: Negative for back pain, myalgias and neck pain  Skin: Negative for pallor, rash and wound  Neurological: Positive for weakness  Negative for dizziness, tremors, light-headedness and headaches  Hematological: Does not bruise/bleed easily  Psychiatric/Behavioral: Negative for agitation, behavioral problems and suicidal ideas  The patient is not nervous/anxious and is not hyperactive  Objective:     Vitals:   Temp (24hrs), Av 3 °F (36 8 °C), Min:98 1 °F (36 7 °C), Max:98 6 °F (37 °C)    Temp:  [98 1 °F (36 7 °C)-98 6 °F (37 °C)] 98 1 °F (36 7 °C)  HR:  [] 77  Resp:  [18] 18  BP: (124-135)/(78-84) 135/84  SpO2:  [97 %-99 %] 97 %  Body mass index is 27 98 kg/m²  Input and Output Summary (last 24 hours): Intake/Output Summary (Last 24 hours) at 2023 0827  Last data filed at 2023 7606  Gross per 24 hour   Intake 540 ml   Output 2805 ml   Net -2265 ml       Physical Exam:   Physical Exam  Vitals reviewed  Constitutional:       General: He is not in acute distress  Appearance: He is well-developed  He is not diaphoretic  HENT:      Head: Normocephalic and atraumatic  Mouth/Throat:      Pharynx: No oropharyngeal exudate  Eyes:      General: No scleral icterus  Extraocular Movements: Extraocular movements intact  Conjunctiva/sclera: Conjunctivae normal    Neck:      Vascular: No JVD  Trachea: No tracheal deviation  Cardiovascular:      Rate and Rhythm: Normal rate and regular rhythm  Heart sounds: No murmur heard  No friction rub  No gallop  Pulmonary:      Effort: Pulmonary effort is normal  No respiratory distress  Breath sounds: No stridor   No wheezing  Abdominal:      General: There is no distension  Palpations: There is no mass  Tenderness: There is no abdominal tenderness  There is no right CVA tenderness or left CVA tenderness  Comments: midline wound present, no purulent discharge noted  Drain in place   Musculoskeletal:         General: No tenderness  Normal range of motion  Right lower leg: No edema  Left lower leg: No edema  Skin:     General: Skin is warm and dry  Coloration: Skin is not pale  Findings: No erythema  Neurological:      Mental Status: He is alert and oriented to person, place, and time  Psychiatric:         Behavior: Behavior normal          Thought Content:  Thought content normal           Additional Data:     Labs:  Results from last 7 days   Lab Units 02/09/23  0635   WBC Thousand/uL 19 39*   HEMOGLOBIN g/dL 7 5*   HEMATOCRIT % 24 9*   PLATELETS Thousands/uL 366   NEUTROS PCT % 84*   LYMPHS PCT % 7*   MONOS PCT % 8   EOS PCT % 0     Results from last 7 days   Lab Units 02/09/23  0635 02/08/23  0337   SODIUM mmol/L 134* 134*   POTASSIUM mmol/L 4 4 4 8   CHLORIDE mmol/L 103 102   CO2 mmol/L 21 21   BUN mg/dL 33* 53*   CREATININE mg/dL 1 47* 1 93*   ANION GAP mmol/L 10 11   CALCIUM mg/dL 8 4 8 6   ALBUMIN g/dL  --  1 3*   TOTAL BILIRUBIN mg/dL  --  0 75   ALK PHOS U/L  --  1,043*   ALT U/L  --  20   AST U/L  --  61*   GLUCOSE RANDOM mg/dL 107 124     Results from last 7 days   Lab Units 02/07/23  1530   INR  1 32*     Results from last 7 days   Lab Units 02/09/23  0734 02/08/23  2114 02/08/23  1715 02/08/23  1112 02/08/23  0751 02/07/23  2221   POC GLUCOSE mg/dl 96 159* 162* 147* 123 179*         Results from last 7 days   Lab Units 02/08/23  0337 02/07/23  1759 02/07/23  1530   LACTIC ACID mmol/L  --  1 2 2 4*   PROCALCITONIN ng/ml 1 37*  --  1 07*       Lines/Drains:  Invasive Devices     Central Venous Catheter Line  Duration           Port A Cath 03/10/22 Right Chest 335 days Peripheral Intravenous Line  Duration           Peripheral IV 02/07/23 Left;Proximal;Ventral (anterior) Forearm 1 day          Drain  Duration           Ileostomy LUQ 83 days    Cholecystostomy Tube 58 days    Abscess Drain Abdomen 27 days                Central Line:  Goal for removal: N/A - Chronic PICC             Imaging: No pertinent imaging reviewed  Recent Cultures (last 7 days):   Results from last 7 days   Lab Units 02/07/23  1759 02/07/23  1530   BLOOD CULTURE   --  No Growth at 24 hrs  No Growth at 24 hrs     GRAM STAIN RESULT  3+ Gram positive rods*  1+ Gram positive cocci in chains*  Rare Disintegrating polys*  --    WOUND CULTURE  Culture too young- will reincubate  --        Last 24 Hours Medication List:   Current Facility-Administered Medications   Medication Dose Route Frequency Provider Last Rate   • acetaminophen  650 mg Oral Q4H PRN Nelia Ambrosio DO     • amLODIPine  2 5 mg Oral Daily Robbie Fleming PA-C     • aspirin  81 mg Oral Daily [place], [place]     • atorvastatin  40 mg Oral Daily [place]     • DULoxetine  30 mg Oral Daily Linette Alejo     • heparin (porcine)  5,000 Units Subcutaneous [facility] Tamara sanchez PA-C     • HYDROmorphone  0 2 mg Intravenous Q4H PRN Nelia Ambrosio DO     • influenza vaccine  0 5 mL Intramuscular Prior to discharge Gene Steiner MD     • insulin glargine  5 Units Subcutaneous HS Kiara Wang PA-C     • insulin lispro  1-6 Units Subcutaneous TID AC Tamara sanchez PA-C     • insulin lispro  1-6 Units Subcutaneous HS Tamara sanchez PA-C     • insulin lispro  4 Units Subcutaneous TID With Meals Robbie Fleming PA-C     • meropenem  1,000 mg Intravenous Q12H Rm Jones MD 1,000 mg (02/08/23 9947)   • methocarbamol  500 mg Oral BID PRN Robbie Fleming PA-C     • multi-electrolyte  75 mL/hr Intravenous Continuous Tamara sanchez PA-C 75 mL/hr (02/08/23 6538)   • ondansetron  4 mg Intravenous Q4H PRN Nelia Ambrosio DO     • oxyCODONE  5 mg Oral Q4H PRN Nelia Ambrosoi DO     • oxyCODONE  2 5 mg Oral Q4H PRN Nelia Ambrosio DO     • pantoprazole  40 mg Oral BID Leena Jimenes PA-C     • pneumococcal 13-valent conjugate vaccine  0 5 mL Intramuscular Prior to discharge Lupe Cotto MD     • simethicone  80 mg Oral 4x Daily (with meals and at bedtime) Leena Jimenes PA-C     • tamsulosin  0 4 mg Oral Daily [facility] [address] [facility]          Today, Patient Was Seen By: Hallie Busby DO    **Please Note: This note may have been constructed using a voice recognition system  **

## 2023-02-09 NOTE — ASSESSMENT & PLAN NOTE
Chronically elevated likely in setting of known hepatic metastasis  · RUQ US Decompressed gallbladder around a cholecystostomy tube, limiting evaluation  Hepatomegaly  Heterogeneous echotexture of the liver in this patient with known metastatic disease  7 mm nonobstructing right intrarenal calculus    · Monitor with am CMP

## 2023-02-09 NOTE — ASSESSMENT & PLAN NOTE
Patient with extensive abdominal surgical history, reports was recently discharged from rehab after prolonged hospitalization several weeks ago  Since then he has had increasing fatigue and generalized weakness, which has been particularly worse over the past several days  Over this timeframe he is also noted the development of foul-smelling drainage and increasing pain from his chronic abdominal wound  Otherwise he reports some mild SOB, and is without other symptoms/complaint at this time    · As evidenced by tachycardia, tachypnea, and leukocytosis  · In setting of purulent abdominal infection  · Procal 1 07  · Lactic intially 2 4; cleared after IVF in ED   · Hx of colon CA w/ extensive abd surgical hx as below   · Patient has several abdominal drains in place, with CT showing improvement in the fluid collections at the left liver lobe, perisplenic area, and the left retroperitoneum inferior to the spleen  · ED gave full 30cc/kg IVF bolus + IV zosyn  · Started on IV meropenem per ID  · Trend WBC; follow-up with pending infectious labs and cultures  · ID consulted; recommendations appreciated  · S/p bedside midline wound debridement + wet dressing 2/8 with general surgery

## 2023-02-09 NOTE — ASSESSMENT & PLAN NOTE
Lab Results   Component Value Date    HGBA1C 8 0 (H) 09/26/2022     ·   ·  home Lantus 8U qHS and humalog 4U TID w/meals   · Dose reduce to 5U qhs + 4 U TID   · 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  · Hypoglycemia protocol  · Diabetic diet

## 2023-02-09 NOTE — PLAN OF CARE
Problem: PAIN - ADULT  Goal: Verbalizes/displays adequate comfort level or baseline comfort level  Description: Interventions:  - Encourage patient to monitor pain and request assistance  - Assess pain using appropriate pain scale  - Administer analgesics based on type and severity of pain and evaluate response  - Implement non-pharmacological measures as appropriate and evaluate response  - Consider cultural and social influences on pain and pain management  - Notify physician/advanced practitioner if interventions unsuccessful or patient reports new pain  Outcome: Progressing     Problem: INFECTION - ADULT  Goal: Absence or prevention of progression during hospitalization  Description: INTERVENTIONS:  - Assess and monitor for signs and symptoms of infection  - Monitor lab/diagnostic results  - Monitor all insertion sites, i e  indwelling lines, tubes, and drains  - Monitor endotracheal if appropriate and nasal secretions for changes in amount and color  - [place] appropriate cooling/warming therapies per order  - Administer medications as ordered  - Instruct and encourage patient and family to use good hand hygiene technique  - Identify and instruct in appropriate isolation precautions for identified infection/condition  Outcome: Progressing  Goal: Absence of fever/infection during neutropenic period  Description: INTERVENTIONS:  - Monitor WBC    Outcome: Progressing     Problem: SAFETY ADULT  Goal: Patient will remain free of falls  Description: INTERVENTIONS:  - Educate patient/family on patient safety including physical limitations  - Instruct patient to call for assistance with activity   - Consult OT/PT to assist with strengthening/mobility   - Keep Call bell within reach  - Keep bed low and locked with side rails adjusted as appropriate  - Keep care items and personal belongings within reach  - Initiate and maintain comfort rounds  - Make Fall Risk Sign visible to staff  - Offer Toileting every 2 Hours, in advance of need  - Initiate/Maintain bed alarm  - Obtain necessary fall risk management equipment:   - Apply yellow socks and bracelet for high fall risk patients  - Consider moving patient to room near nurses station  Outcome: Progressing  Goal: Maintain or return to baseline ADL function  Description: INTERVENTIONS:  -  Assess patient's ability to carry out ADLs; assess patient's baseline for ADL function and identify physical deficits which impact ability to perform ADLs (bathing, care of mouth/teeth, toileting, grooming, dressing, etc )  - Assess/evaluate cause of self-care deficits   - Assess range of motion  - Assess patient's mobility; develop plan if impaired  - Assess patient's need for assistive devices and provide as appropriate  - Encourage maximum independence but intervene and supervise when necessary  - Involve family in performance of ADLs  - Assess for home care needs following discharge   - Consider OT consult to assist with ADL evaluation and planning for discharge  - Provide patient education as appropriate  Outcome: Progressing  Goal: Maintains/Returns to pre admission functional level  Description: INTERVENTIONS:  - Perform BMAT or MOVE assessment daily    - Set and communicate daily mobility goal to care team and patient/family/caregiver  - Collaborate with rehabilitation services on mobility goals if consulted  - Perform Range of Motion 3 times a day  - Reposition patient every 2 hours    - Dangle patient 3 times a day  - Stand patient 3 times a day  - Ambulate patient 3 times a day  - Out of bed to chair 3 times a day   - Out of bed for meals 3 times a day  - Out of bed for toileting  - Record patient progress and toleration of activity level   Outcome: Progressing     Problem: DISCHARGE PLANNING  Goal: Discharge to home or other facility with appropriate resources  Description: INTERVENTIONS:  - Identify barriers to discharge w/patient and caregiver  - Arrange for needed discharge resources and transportation as appropriate  - Identify discharge learning needs (meds, wound care, etc )  - Arrange for interpretive services to assist at discharge as needed  - Refer to Case Management Department for coordinating discharge planning if the patient needs post-hospital services based on physician/advanced practitioner order or complex needs related to functional status, cognitive ability, or social support system  Outcome: Progressing     Problem: Knowledge Deficit  Goal: Patient/family/caregiver demonstrates understanding of disease process, treatment plan, medications, and discharge instructions  Description: Complete learning assessment and assess knowledge base    Interventions:  - Provide teaching at level of understanding  - Provide teaching via preferred learning methods  Outcome: Progressing     Problem: SKIN/TISSUE INTEGRITY - ADULT  Goal: Skin Integrity remains intact(Skin Breakdown Prevention)  Description: Assess:  -Perform Michael assessment every   -Clean and moisturize skin every   -Inspect skin when repositioning, toileting, and assisting with ADLS  -Assess under medical devices such as  every   -Assess extremities for adequate circulation and sensation     Bed Management:  -Have minimal linens on bed & keep smooth, unwrinkled  -Change linens as needed when moist or perspiring  -Avoid sitting or lying in one position for more than  hours while in bed  -Keep HOB at degrees     Toileting:  -Offer bedside commode  -Assess for incontinence every   -Use incontinent care products after each incontinent episode such as     Activity:  -Mobilize patient  times a day  -Encourage activity and walks on unit  -Encourage or provide ROM exercises   -Turn and reposition patient every  Hours  -Use appropriate equipment to lift or move patient in bed  -Instruct/ Assist with weight shifting every  when out of bed in chair  -Consider limitation of chair time  hour intervals    Skin Care:  -Avoid use of baby powder, tape, friction and shearing, hot water or constrictive clothing  -Relieve pressure over bony prominences using   -Do not massage red bony areas    Next Steps:  -Teach patient strategies to minimize risks such as    -Consider consults to  interdisciplinary teams such as   Outcome: Progressing  Goal: Incision(s), wounds(s) or drain site(s) healing without S/S of infection  Description: INTERVENTIONS  - Assess and document dressing, incision, wound bed, drain sites and surrounding tissue  - Provide patient and family education  - Perform skin care/dressing changes every   Outcome: Progressing  Goal: Pressure injury heals and does not worsen  Description: Interventions:  - Implement low air loss mattress or specialty surface (Criteria met)  - Apply silicone foam dressing  - Instruct/assist with weight shifting every  minutes when in chair   - Limit chair time to  hour intervals  - Use special pressure reducing interventions such as  when in chair   - Apply fecal or urinary incontinence containment device   - Perform passive or active ROM every   - Turn and reposition patient & offload bony prominences every  hours   - Utilize friction reducing device or surface for transfers   - Consider consults to  interdisciplinary teams such as   - Use incontinent care products after each incontinent episode such as   - Consider nutrition services referral as needed  Outcome: Progressing     Problem: Prexisting or High Potential for Compromised Skin Integrity  Goal: Skin integrity is maintained or improved  Description: INTERVENTIONS:  - Identify patients at risk for skin breakdown  - Assess and monitor skin integrity  - Assess and monitor nutrition and hydration status  - Monitor labs   - Assess for incontinence   - Turn and reposition patient  - Assist with mobility/ambulation  - Relieve pressure over bony prominences  - Avoid friction and shearing  - Provide appropriate hygiene as needed including keeping skin clean and dry  - Evaluate need for skin moisturizer/barrier cream  - Collaborate with interdisciplinary team   - Patient/family teaching  - Consider wound care consult   Outcome: Progressing     Problem: MOBILITY - ADULT  Goal: Maintain or return to baseline ADL function  Description: INTERVENTIONS:  -  Assess patient's ability to carry out ADLs; assess patient's baseline for ADL function and identify physical deficits which impact ability to perform ADLs (bathing, care of mouth/teeth, toileting, grooming, dressing, etc )  - Assess/evaluate cause of self-care deficits   - Assess range of motion  - Assess patient's mobility; develop plan if impaired  - Assess patient's need for assistive devices and provide as appropriate  - Encourage maximum independence but intervene and supervise when necessary  - Involve family in performance of ADLs  - Assess for home care needs following discharge   - Consider OT consult to assist with ADL evaluation and planning for discharge  - Provide patient education as appropriate  Outcome: Progressing  Goal: Maintains/Returns to pre admission functional level  Description: INTERVENTIONS:  - Perform BMAT or MOVE assessment daily    - Set and communicate daily mobility goal to care team and patient/family/caregiver  - Collaborate with rehabilitation services on mobility goals if consulted  - Perform Range of Motion 3 times a day  - Reposition patient every 2 hours    - Dangle patient 3 times a day  - Stand patient 3 times a day  - Ambulate patient 3 times a day  - Out of bed to chair 3 times a day   - Out of bed for meals 3 times a day  - Out of bed for toileting  - Record patient progress and toleration of activity level   Outcome: Progressing

## 2023-02-09 NOTE — PHYSICAL THERAPY NOTE
Physical Therapy Treatment Note    Patient Name: Eloise Mane    Diagnosis: Abdominal pain [R10 9]  Wound infection [T14  8XXA, L08 9]  Acute kidney injury ([facility] [address] ) [N17 9]  Sepsis, due to unspecified organism, unspecified whether acute organ dysfunction present ([facility] ) [A41 9]     02/09/23 1053   PT Last Visit   PT Visit Date 02/09/23   Note Type   Note Type Treatment   Pain Assessment   Pain Assessment Tool 0-10   Pain Score 3   Pain Location/Orientation Location: Abdomen   Hospital Pain Intervention(s) Repositioned; Ambulation/increased activity  (RN made aware)   Restrictions/Precautions   Weight Bearing Precautions Per Order No   Other Precautions Chair Alarm; Bed Alarm;Multiple lines; Fall Risk;Pain   General   Chart Reviewed Yes   Response to Previous Treatment Patient with no complaints from previous session  Family/Caregiver Present Yes   Cognition   Overall Cognitive Status WFL   Arousal/Participation Alert; Responsive; Cooperative   Attention Within functional limits   Orientation Level Oriented X4   Memory Within functional limits   Following Commands Follows all commands and directions without difficulty   Comments Pt agreeable to PT  Subjective   Subjective "I'm tired, but I want to try and get up "   Bed Mobility   Supine to Sit 4  Minimal assistance   Additional items Assist x 1;HOB elevated; Bedrails; Increased time required;Verbal cues;LE management   Transfers   Sit to Stand 4  Minimal assistance   Additional items Assist x 1; Increased time required;Verbal cues   Stand to Sit 4  Minimal assistance   Additional items Assist x 1; Armrests; Increased time required;Verbal cues   Ambulation/Elevation   Gait pattern Decreased toe off;Decreased heel strike;Decreased hip extension; Excessively slow; Short stride; Shuffling   Gait Assistance 4  Minimal assist   Additional items Assist x 1;Verbal cues   Assistive Device Rolling walker   Distance 10 feet   Ambulation/Elevation Additional Comments Pt's ambulation distance limited secondary to complaints of fatigue and dizziness, BP post ambulation 121/83  Balance   Static Sitting Fair +   Dynamic Sitting Fair   Static Standing Fair -   Dynamic Standing Poor +   Ambulatory Poor +   Endurance Deficit   Endurance Deficit Yes   Endurance Deficit Description decreased activity tolerance   Activity Tolerance   Activity Tolerance Patient limited by fatigue   Nurse Made Aware Discussed case with RN [facility]   Exercises   Heelslides Sitting;10 reps;AROM; Bilateral  (long sitting)   Hip Flexion Sitting;20 reps;AROM; Bilateral   Hip Abduction Sitting;20 reps;AROM; Bilateral  (long sitting)   Hip Adduction Sitting;20 reps;AROM; Bilateral   Knee AROM Long Arc Quad Sitting;20 reps;AROM; Bilateral   Ankle Pumps Sitting;20 reps;AROM; Bilateral   Assessment   Prognosis Good   Problem List Decreased strength;Decreased endurance; Impaired balance;Decreased mobility; Decreased skin integrity;Pain   Assessment Chart reviewed  Patient was received supine in bed in NAD and agreeable to PT session  Today's PT treatment session consisted of therapeutic activity for facilitation of transitional movements and safe performance of correct technique for bed mobility and sit to stand transfers, therapeutic exercise to increase lower extremity muscle strength, and gait training to promote safe and functional ambulation on level surfaces  In comparison to the previous session the patient has made progress as evident by ability to perform all functional mobility with assist of one  Pt was also able to ambulate an increased distance with use of RW  When ambulating pt exhibits decreased farhan, decreased stride length, and decreased heel strike  Pt's ambulation distance was limited secondary to complaints of fatigue and dizziness, BP post ambulation 121/83  Pt reported a resolve in symptoms with a seated rest break  Pt tolerated all therapeutic exercise well without complaints   Overall, patient tolerated today's session well and continues to be making progress towards achieving his STG's  Patient's prognosis for achieving their STG's is good as evident by pt's motivation and good family support  PT intervention continues to be appropriate as the patient continues to be limited by pain, decreased lower extremity strength, impaired balance, decreased endurance, gait deviations, and decreased functional mobility  Continue to recommend STR  PT to continue to see patient in order to address the deficits listed above and provide interventions consistent with the POC in order to achieve STG's and optimize the patient's independence with functional mobility  Barriers to Discharge Inaccessible home environment   Goals   STG Expiration Date 02/18/23   PT Treatment Day 1   Plan   Treatment/Interventions Functional transfer training;LE strengthening/ROM; Therapeutic exercise; Endurance training;Patient/family training;Bed mobility;Gait training;Spoke to nursing;OT;Family   Progress Progressing toward goals   PT Frequency 3-5x/wk   Recommendation   PT Discharge Recommendation Post acute rehabilitation services   AM-PAC Basic Mobility Inpatient   Turning in Flat Bed Without Bedrails 3   Lying on Back to Sitting on Edge of Flat Bed Without Bedrails 3   Moving Bed to Chair 3   Standing Up From Chair Using Arms 3   Walk in Room 2   Climb 3-5 Stairs With Railing 2   Basic Mobility Inpatient Raw Score 16   Basic Mobility Standardized Score 38 32   Highest Level Of Mobility   JH-HLM Goal 5: Stand one or more mins   JH-HLM Achieved 6: Walk 10 steps or more   Education   Education Provided Mobility training;Home exercise program;Assistive device   Patient Reinforcement needed;Demonstrates acceptance/verbal understanding   End of Consult   Patient Position at End of Consult Bedside chair;Bed/Chair alarm activated; All needs within reach  (RN aware)     Margarette Bumpers, PT, DPT    Time of PT treatment session: [id number]  25 minutes

## 2023-02-10 ENCOUNTER — APPOINTMENT (OUTPATIENT)
Dept: NON INVASIVE DIAGNOSTICS | Facility: HOSPITAL | Age: 58
End: 2023-02-10
Attending: RADIOLOGY

## 2023-02-10 LAB
ANION GAP SERPL CALCULATED.3IONS-SCNC: 10 MMOL/L (ref 4–13)
BASOPHILS # BLD AUTO: 0.03 THOUSANDS/ÂΜL (ref 0–0.1)
BASOPHILS NFR BLD AUTO: 0 % (ref 0–1)
BUN SERPL-MCNC: 24 MG/DL (ref 5–25)
CALCIUM SERPL-MCNC: 8.4 MG/DL (ref 8.3–10.1)
CHLORIDE SERPL-SCNC: 101 MMOL/L (ref 96–108)
CO2 SERPL-SCNC: 21 MMOL/L (ref 21–32)
CREAT SERPL-MCNC: 1.17 MG/DL (ref 0.6–1.3)
EOSINOPHIL # BLD AUTO: 0 THOUSAND/ÂΜL (ref 0–0.61)
EOSINOPHIL NFR BLD AUTO: 0 % (ref 0–6)
ERYTHROCYTE [DISTWIDTH] IN BLOOD BY AUTOMATED COUNT: 17.6 % (ref 11.6–15.1)
GFR SERPL CREATININE-BSD FRML MDRD: 68 ML/MIN/1.73SQ M
GLUCOSE SERPL-MCNC: 127 MG/DL (ref 65–140)
GLUCOSE SERPL-MCNC: 131 MG/DL (ref 65–140)
GLUCOSE SERPL-MCNC: 137 MG/DL (ref 65–140)
GLUCOSE SERPL-MCNC: 146 MG/DL (ref 65–140)
GLUCOSE SERPL-MCNC: 158 MG/DL (ref 65–140)
GLUCOSE SERPL-MCNC: 158 MG/DL (ref 65–140)
HCT VFR BLD AUTO: 26.3 % (ref 36.5–49.3)
HGB BLD-MCNC: 8.1 G/DL (ref 12–17)
IMM GRANULOCYTES # BLD AUTO: 0.2 THOUSAND/UL (ref 0–0.2)
IMM GRANULOCYTES NFR BLD AUTO: 1 % (ref 0–2)
LACTATE SERPL-SCNC: 0.7 MMOL/L (ref 0.5–2)
LYMPHOCYTES # BLD AUTO: 0.69 THOUSANDS/ÂΜL (ref 0.6–4.47)
LYMPHOCYTES NFR BLD AUTO: 5 % (ref 14–44)
MCH RBC QN AUTO: 26.1 PG (ref 26.8–34.3)
MCHC RBC AUTO-ENTMCNC: 30.8 G/DL (ref 31.4–37.4)
MCV RBC AUTO: 85 FL (ref 82–98)
MONOCYTES # BLD AUTO: 0.86 THOUSAND/ÂΜL (ref 0.17–1.22)
MONOCYTES NFR BLD AUTO: 6 % (ref 4–12)
NEUTROPHILS # BLD AUTO: 13.03 THOUSANDS/ÂΜL (ref 1.85–7.62)
NEUTS SEG NFR BLD AUTO: 88 % (ref 43–75)
NRBC BLD AUTO-RTO: 0 /100 WBCS
PLATELET # BLD AUTO: 375 THOUSANDS/UL (ref 149–390)
PMV BLD AUTO: 9.2 FL (ref 8.9–12.7)
POTASSIUM SERPL-SCNC: 4.3 MMOL/L (ref 3.5–5.3)
RBC # BLD AUTO: 3.1 MILLION/UL (ref 3.88–5.62)
SODIUM SERPL-SCNC: 132 MMOL/L (ref 135–147)
WBC # BLD AUTO: 14.81 THOUSAND/UL (ref 4.31–10.16)

## 2023-02-10 RX ORDER — ACETAMINOPHEN 325 MG/1
650 TABLET ORAL EVERY 4 HOURS PRN
Status: DISCONTINUED | OUTPATIENT
Start: 2023-02-10 | End: 2023-02-24 | Stop reason: HOSPADM

## 2023-02-10 RX ADMIN — OXYCODONE HYDROCHLORIDE 5 MG: 5 TABLET ORAL at 21:41

## 2023-02-10 RX ADMIN — VANCOMYCIN HYDROCHLORIDE 750 MG: 750 INJECTION, SOLUTION INTRAVENOUS at 12:30

## 2023-02-10 RX ADMIN — DULOXETINE HYDROCHLORIDE 30 MG: 30 CAPSULE, DELAYED RELEASE ORAL at 08:23

## 2023-02-10 RX ADMIN — INSULIN LISPRO 4 UNITS: 100 INJECTION, SOLUTION INTRAVENOUS; SUBCUTANEOUS at 11:51

## 2023-02-10 RX ADMIN — ONDANSETRON 4 MG: 2 INJECTION INTRAMUSCULAR; INTRAVENOUS at 21:41

## 2023-02-10 RX ADMIN — PANTOPRAZOLE SODIUM 40 MG: 40 TABLET, DELAYED RELEASE ORAL at 17:17

## 2023-02-10 RX ADMIN — Medication 6 MG: at 21:57

## 2023-02-10 RX ADMIN — INSULIN GLARGINE 5 UNITS: 100 INJECTION, SOLUTION SUBCUTANEOUS at 21:42

## 2023-02-10 RX ADMIN — SODIUM CHLORIDE, SODIUM GLUCONATE, SODIUM ACETATE, POTASSIUM CHLORIDE, MAGNESIUM CHLORIDE, SODIUM PHOSPHATE, DIBASIC, AND POTASSIUM PHOSPHATE 100 ML/HR: .53; .5; .37; .037; .03; .012; .00082 INJECTION, SOLUTION INTRAVENOUS at 01:43

## 2023-02-10 RX ADMIN — TAMSULOSIN HYDROCHLORIDE 0.4 MG: 0.4 CAPSULE ORAL at 17:17

## 2023-02-10 RX ADMIN — ACETAMINOPHEN 650 MG: 325 TABLET, FILM COATED ORAL at 21:41

## 2023-02-10 RX ADMIN — ONDANSETRON 4 MG: 2 INJECTION INTRAMUSCULAR; INTRAVENOUS at 14:26

## 2023-02-10 RX ADMIN — ATORVASTATIN CALCIUM 40 MG: 40 TABLET, FILM COATED ORAL at 08:23

## 2023-02-10 RX ADMIN — INSULIN LISPRO 4 UNITS: 100 INJECTION, SOLUTION INTRAVENOUS; SUBCUTANEOUS at 08:22

## 2023-02-10 RX ADMIN — ONDANSETRON 4 MG: 2 INJECTION INTRAMUSCULAR; INTRAVENOUS at 09:43

## 2023-02-10 RX ADMIN — INSULIN LISPRO 1 UNITS: 100 INJECTION, SOLUTION INTRAVENOUS; SUBCUTANEOUS at 21:45

## 2023-02-10 RX ADMIN — INSULIN LISPRO 4 UNITS: 100 INJECTION, SOLUTION INTRAVENOUS; SUBCUTANEOUS at 17:16

## 2023-02-10 RX ADMIN — SIMETHICONE 80 MG: 80 TABLET, CHEWABLE ORAL at 17:15

## 2023-02-10 RX ADMIN — VANCOMYCIN HYDROCHLORIDE 1250 MG: 5 INJECTION, POWDER, LYOPHILIZED, FOR SOLUTION INTRAVENOUS at 09:54

## 2023-02-10 RX ADMIN — SODIUM CHLORIDE, PRESERVATIVE FREE 10 ML: 5 INJECTION INTRAVENOUS at 08:26

## 2023-02-10 RX ADMIN — ASPIRIN 81 MG 81 MG: 81 TABLET ORAL at 08:23

## 2023-02-10 RX ADMIN — AMLODIPINE BESYLATE 2.5 MG: 2.5 TABLET ORAL at 08:23

## 2023-02-10 RX ADMIN — HEPARIN SODIUM 5000 UNITS: 5000 INJECTION INTRAVENOUS; SUBCUTANEOUS at 14:12

## 2023-02-10 RX ADMIN — SIMETHICONE 80 MG: 80 TABLET, CHEWABLE ORAL at 08:23

## 2023-02-10 RX ADMIN — ERTAPENEM SODIUM 1000 MG: 1 INJECTION, POWDER, LYOPHILIZED, FOR SOLUTION INTRAMUSCULAR; INTRAVENOUS at 11:48

## 2023-02-10 RX ADMIN — HEPARIN SODIUM 5000 UNITS: 5000 INJECTION INTRAVENOUS; SUBCUTANEOUS at 05:10

## 2023-02-10 RX ADMIN — INSULIN LISPRO 1 UNITS: 100 INJECTION, SOLUTION INTRAVENOUS; SUBCUTANEOUS at 11:51

## 2023-02-10 RX ADMIN — COLLAGENASE SANTYL: 250 OINTMENT TOPICAL at 14:15

## 2023-02-10 RX ADMIN — SIMETHICONE 80 MG: 80 TABLET, CHEWABLE ORAL at 21:41

## 2023-02-10 RX ADMIN — VANCOMYCIN HYDROCHLORIDE 750 MG: 750 INJECTION, SOLUTION INTRAVENOUS at 21:58

## 2023-02-10 RX ADMIN — PANTOPRAZOLE SODIUM 40 MG: 40 TABLET, DELAYED RELEASE ORAL at 08:23

## 2023-02-10 RX ADMIN — HEPARIN SODIUM 5000 UNITS: 5000 INJECTION INTRAVENOUS; SUBCUTANEOUS at 21:42

## 2023-02-10 RX ADMIN — MEROPENEM 1000 MG: 1 INJECTION, POWDER, FOR SOLUTION INTRAVENOUS at 02:30

## 2023-02-10 NOTE — CONSULTS
[facility] Electronic Health Record Consult  IR has been consulted to evaluate the patient, determine the appropriate procedure, and whether or not a procedure can and should be performed regarding the care of [address] [place] [address]  We were consulted by hospitalist service concerning Kay Largo, and to possibly perform a tube check if medically appropriate for the patient  The patient is aware that a specialty consultation is taking place, and agrees to the IR Consult on their behalf  Assessment/Plan:   60 yo male with history of cholecystostomy tube placement as well as abscess catheters  Decreased output is communicated by the referring team   Catheter will be evaluated in IR  I spent 20 minutes in medical consultative time evaluating the medical record and imaging of [address] [place] [address]  Thank you for allowing [facility] to participate in the care of [address] [place] [address]  Please don't hesitate to call or TigerText us with any questions       Sravanthi Carrion, DO

## 2023-02-10 NOTE — ASSESSMENT & PLAN NOTE
Lab Results   Component Value Date    EGFR 68 02/10/2023    EGFR 51 02/09/2023    EGFR 37 02/08/2023    CREATININE 1 17 02/10/2023    CREATININE 1 47 (H) 02/09/2023    CREATININE 1 93 (H) 02/08/2023     · Creatinine 2 5 to; baseline 1 0, improved to 1 17 today  · Suspecting possible multifactorial cause in setting of dehydration and sepsis  · Avoid nephrotoxic agents  · Continue with IV fluids  · AM BMP

## 2023-02-10 NOTE — PROGRESS NOTES
[address] [facility]  Progress Note - Bevely Guardian 1965, 61 y o  male MRN: [id number]  Unit/Bed#: -Shawn Encounter: [id number]  Primary Care Provider: Goyo Lucas MD   Date and time admitted to hospital: 2/7/2023  3:01 PM    Nephrolithiasis  [address] revealed 7 mm nonobstructing right intrarenal calculus  No hydronephrosis noted on CT a/p  Asymptomatic at this time  · Continue w/ IV fluids  · flomax  · Strain urine  · Intake and output    Hyponatremia  Assessment & Plan  · Sodium 129 on ED presentation, improving with IV fluids  · Suspected to be in the setting of dehydration  · AM BMP recheck     Hyperkalemia  Assessment & Plan  Potassium 5 9 on ED presentation, resolved  · Suspected be in setting of ALEXY  · AM BMP recheck ordered     Abnormal CT scan  Assessment & Plan  · CT shows interval decrease in bilateral pleural effusions, however multiple small bilateral lung nodules are seen which are concerning for possible metastatic lung disease  · Patient reports having prior known lung nodules  · Patient does have some complaints of SOB, however is oxygenating appropriately on room air; continue to monitor oxygenation  · Outpatient f/u    Elevated alkaline phosphatase level  Assessment & Plan  Chronically elevated likely in setting of known hepatic metastasis  · RUQ US Decompressed gallbladder around a cholecystostomy tube, limiting evaluation  Hepatomegaly  Heterogeneous echotexture of the liver in this patient with known metastatic disease  7 mm nonobstructing right intrarenal calculus    · Monitor with am CMP    Acute on chronic kidney failure [facility])  Assessment & Plan  Lab Results   Component Value Date    EGFR 68 02/10/2023    EGFR 51 02/09/2023    EGFR 37 02/08/2023    CREATININE 1 17 02/10/2023    CREATININE 1 47 (H) 02/09/2023    CREATININE 1 93 (H) 02/08/2023     · Creatinine 2 5 to; baseline 1 0, improved to 1 17 today  · Suspecting possible multifactorial cause in setting of dehydration and sepsis  · Avoid nephrotoxic agents  · Continue with IV fluids  · AM BMP    Colon cancer metastasized to liver [facility])  Assessment & Plan  Follows w/ outpatient [facility] onc and palliative care  Extensive abdominal surgical history (11/7 ex lap w/ section 3 liver resection, and development of left upper quadrant hematoma and suspected leak from transverse colon anastomosis complication; 56/34 right hemicolectomy; 11/17 re-exploration w/partial descending colectomy; 12/8 IR percutaneous cholecystostomy tube and hepatectomy abscess drainage; 12/20 IR drains placed for perisplenic abscess)  · Patient does not wish to follow with [place] oncology anymore as it is too far of a drive; would like to establish with [place] oncology (already follows with Dr Al Carrizales)  · Continue outpatient f/u    Benign essential hypertension  Assessment & Plan  BP stable  · Continue prehospital HTN medications  · Monitor BP per unit protocol    Type 2 diabetes mellitus without complication, with long-term current use of insulin (HCC)  Assessment & Plan    Lab Results   Component Value Date    HGBA1C 8 0 (H) 09/26/2022     ·   ·  home Lantus 8U qHS and humalog 4U TID w/meals   · Dose reduce to 5U qhs + 4 U TID   · 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  · Hypoglycemia protocol  · Diabetic diet     * Sepsis [facility])  Assessment & Plan  Patient with extensive abdominal surgical history, reports was recently discharged from rehab after prolonged hospitalization several weeks ago  Since then he has had increasing fatigue and generalized weakness, which has been particularly worse over the past several days  Over this timeframe he is also noted the development of foul-smelling drainage and increasing pain from his chronic abdominal wound  Otherwise he reports some mild SOB, and is without other symptoms/complaint at this time    · As evidenced by tachycardia, tachypnea, and leukocytosis  · In setting of purulent abdominal infection  · Procal 1 07  · Lactic intially 2 4; cleared after IVF in ED   · Hx of colon CA w/ extensive abd surgical hx as below   · Patient has several abdominal drains in place, with CT showing improvement in the fluid collections at the left liver lobe, perisplenic area, and the left retroperitoneum inferior to the spleen  · ED gave full 30cc/kg IVF bolus + IV zosyn  · Started on IV meropenem per ID, transitioned to IV ertapenem and IV vancomycin based on wound cultures  · Trend WBC; follow-up with pending infectious labs and cultures  · ID consulted; recommendations appreciated   · S/p bedside midline wound debridement + wet dressing  with general surgery        VTE Pharmacologic Prophylaxis: VTE Score: 6 Moderate Risk (Score 3-4) - Pharmacological DVT Prophylaxis Ordered: heparin  Patient Centered Rounds: I performed bedside rounds with nursing staff today  Discussions with Specialists or Other Care Team Provider: JIMENA    Education and Discussions with Family / Patient: Patient declined call to   Time Spent for Care: 60 minutes  More than 50% of total time spent on counseling and coordination of care as described above  Current Length of Stay: 3 day(s)  Current Patient Status: Inpatient   Certification Statement: The patient will continue to require additional inpatient hospital stay due to sepsis, on IV antibiotics  Discharge Plan: Anticipate discharge in 24-48 hrs to discharge location to be determined pending rehab evaluations  Code Status: Level 1 - Full Code    Subjective:   Pt notes fevers and chills overnight  Endorses some nausea and vomiting  Denies belly pain  Good urinary output      Objective:     Vitals:   Temp (24hrs), Av 3 °F (37 4 °C), Min:97 2 °F (36 2 °C), Max:101 °F (38 3 °C)    Temp:  [97 2 °F (36 2 °C)-101 °F (38 3 °C)] 98 4 °F (36 9 °C)  HR:  [] 119  Resp:  [16-18] 18  BP: (124-134)/(79-82) 134/82  SpO2:  [96 %-99 %] 96 %  Body mass index is 27 98 kg/m²  Input and Output Summary (last 24 hours): Intake/Output Summary (Last 24 hours) at 2/10/2023 1325  Last data filed at 2/10/2023 1148  Gross per 24 hour   Intake 3757 5 ml   Output 1966 ml   Net 1791 5 ml       Physical Exam:   Physical Exam  Vitals reviewed  Constitutional:       General: He is not in acute distress  Appearance: He is well-developed  He is not diaphoretic  HENT:      Head: Normocephalic and atraumatic  Mouth/Throat:      Pharynx: No oropharyngeal exudate  Eyes:      General: No scleral icterus  Extraocular Movements: Extraocular movements intact  Conjunctiva/sclera: Conjunctivae normal    Neck:      Vascular: No JVD  Trachea: No tracheal deviation  Cardiovascular:      Rate and Rhythm: Normal rate and regular rhythm  Heart sounds: No murmur heard  No friction rub  No gallop  Pulmonary:      Effort: Pulmonary effort is normal  No respiratory distress  Breath sounds: No stridor  No wheezing  Abdominal:      General: There is no distension  Palpations: There is no mass  Tenderness: There is no abdominal tenderness  There is no right CVA tenderness or left CVA tenderness  Comments: Midline wound noted  Biliary drain loose     Musculoskeletal:         General: No tenderness  Normal range of motion  Right lower leg: No edema  Left lower leg: No edema  Skin:     General: Skin is warm and dry  Coloration: Skin is not pale  Findings: No erythema  Neurological:      Mental Status: He is alert and oriented to person, place, and time  Psychiatric:         Behavior: Behavior normal          Thought Content: Thought content normal       Review of Systems   Constitutional: Positive for chills and fever  Negative for activity change, appetite change and fatigue  HENT: Negative for congestion, postnasal drip, rhinorrhea and sinus pain  Eyes: Negative for photophobia, pain and discharge     Respiratory: Negative for apnea, cough, chest tightness, shortness of breath and wheezing  Cardiovascular: Negative for chest pain and leg swelling  Gastrointestinal: Positive for nausea  Negative for abdominal distention, abdominal pain, constipation, diarrhea and vomiting  Endocrine: Negative for polyuria  Genitourinary: Negative for decreased urine volume, difficulty urinating, dysuria, hematuria and urgency  Musculoskeletal: Negative for back pain, myalgias and neck pain  Skin: Negative for pallor, rash and wound  Neurological: Positive for weakness  Negative for dizziness, tremors, light-headedness and headaches  Hematological: Does not bruise/bleed easily  Psychiatric/Behavioral: Negative for agitation, behavioral problems and suicidal ideas  The patient is not nervous/anxious and is not hyperactive            Additional Data:     Labs:  Results from last 7 days   Lab Units 02/10/23  0511   WBC Thousand/uL 14 81*   HEMOGLOBIN g/dL 8 1*   HEMATOCRIT % 26 3*   PLATELETS Thousands/uL 375   NEUTROS PCT % 88*   LYMPHS PCT % 5*   MONOS PCT % 6   EOS PCT % 0     Results from last 7 days   Lab Units 02/10/23  0511 02/09/23  0635   SODIUM mmol/L 132* 134*   POTASSIUM mmol/L 4 3 4 4   CHLORIDE mmol/L 101 103   CO2 mmol/L 21 21   BUN mg/dL 24 33*   CREATININE mg/dL 1 17 1 47*   ANION GAP mmol/L 10 10   CALCIUM mg/dL 8 4 8 4   ALBUMIN g/dL  --  1 3*   TOTAL BILIRUBIN mg/dL  --  0 68   ALK PHOS U/L  --  925*   ALT U/L  --  17   AST U/L  --  59*   GLUCOSE RANDOM mg/dL 131 107     Results from last 7 days   Lab Units 02/07/23  1530   INR  1 32*     Results from last 7 days   Lab Units 02/10/23  1149 02/10/23  0801 02/09/23 2011 02/09/23  1552 02/09/23  1108 02/09/23  0734 02/08/23  2114 02/08/23  1715 02/08/23  1112 02/08/23  0751 02/07/23  2221   POC GLUCOSE mg/dl 158* 137 166* 118 192* 96 159* 162* 147* 123 179*         Results from last 7 days   Lab Units 02/10/23  0955 02/08/23  0337 02/07/23  1759 02/07/23  1530 LACTIC ACID mmol/L 0 7  --  1 2 2 4*   PROCALCITONIN ng/ml  --  1 37*  --  1 07*       Lines/Drains:  Invasive Devices     Central Venous Catheter Line  Duration           Port A Cath 03/10/22 Right Chest 337 days          Peripheral Intravenous Line  Duration           Peripheral IV 02/07/23 Left;Proximal;Ventral (anterior) Forearm 2 days          Drain  Duration           Ileostomy LUQ 84 days    Cholecystostomy Tube 59 days    Abscess Drain Abdomen 28 days                Central Line:  Goal for removal: N/A - Chronic PICC             Imaging: No pertinent imaging reviewed  Recent Cultures (last 7 days):   Results from last 7 days   Lab Units 02/07/23  1759 02/07/23  1530   BLOOD CULTURE   --  No Growth at 48 hrs  No Growth at 48 hrs     GRAM STAIN RESULT  3+ Gram positive rods*  1+ Gram positive cocci in chains*  Rare Disintegrating polys*  --    WOUND CULTURE  3+ Growth of Escherichia coli ESBL*  3+ Growth of Staphylococcus aureus*  3+ Growth of  --        Last 24 Hours Medication List:   Current Facility-Administered Medications   Medication Dose Route Frequency Provider Last Rate   • acetaminophen  650 mg Oral Q4H PRN Nelia Ambrosio DO     • [facility]  2 [facility]     • aspirin  81 mg Oral Daily [place], [place]     • atorvastatin  40 mg Oral Daily [place]     • DULoxetine  30 mg Oral Daily [place]     • ertapenem  1,000 mg Intravenous Q24H Ermias Parrish MD 1,000 mg (02/10/23 1148)   • heparin (porcine)  5,000 Units Subcutaneous [facility] NANETTE sanchez     • HYDROmorphone  0 2 mg Intravenous Q4H PRN Nelia Ambrosio DO     • influenza vaccine  0 5 mL Intramuscular Prior to discharge Alber Overton MD     • insulin glargine  5 Units Subcutaneous HS Kiara Wang PA-C     • insulin lispro  1-6 Units Subcutaneous TID AC Evans sanchez PA-C     • insulin lispro  1-6 Units Subcutaneous HS Ori Trinidad PA-C     • insulin lispro  4 Units Subcutaneous TID With Meals Princess De Leon PA-C     • melatonin  6 mg Oral HS PRN Sindy Dumas PA-C     • methocarbamol  500 mg Oral BID PRN Princess De Leon PA-C     • multi-electrolyte  100 mL/hr Intravenous Continuous Nelia Kenzie Saraiya,  mL/hr (02/10/23 0143)   • ondansetron  4 mg Intravenous Q4H PRN Nelia Kenzie Saraiya, DO     • oxyCODONE  5 mg Oral Q4H PRN Nelia Kenzie Saraiya, DO     • oxyCODONE  2 5 mg Oral Q4H PRN Nelia Kenzie Saraiya, DO     • pantoprazole  40 mg Oral BID Mallory Vazquez PA-C     • pneumococcal 13-valent conjugate vaccine  0 5 mL Intramuscular Prior to discharge Vidal Harden MD     • simethicone  80 mg Oral 4x Daily (with meals and at bedtime) Princess De Leon PA-C     • sodium chloride (PF)  10 mL Intravenous Daily Nelia Kenzie Saraiya, DO     • tamsulosin  0 4 mg Oral Daily With [address] [facility]     • vancomycin  750 mg Intravenous Once Brittney Walker MD     • vancomycin  750 mg Intravenous Q12H Brittney Walker MD          Today, Patient Was Seen By: Juan Wells DO    **Please Note: This note may have been constructed using a voice recognition system  **

## 2023-02-10 NOTE — ASSESSMENT & PLAN NOTE
Follows w/ outpatient [facility] onc and palliative care   Extensive abdominal surgical history (11/7 ex lap w/ section 3 liver resection, and development of left upper quadrant hematoma and suspected leak from transverse colon anastomosis complication; 52/70 right hemicolectomy; 11/17 re-exploration w/partial descending colectomy; 12/8 IR percutaneous cholecystostomy tube and hepatectomy abscess drainage; 12/20 IR drains placed for perisplenic abscess)  · Patient does not wish to follow with [place] oncology anymore as it is too far of a drive; would like to establish with [facility] (already follows with Dr Sarah Connor)  · Continue outpatient f/u

## 2023-02-10 NOTE — PLAN OF CARE
Problem: PAIN - ADULT  Goal: Verbalizes/displays adequate comfort level or baseline comfort level  Description: Interventions:  - Encourage patient to monitor pain and request assistance  - Assess pain using appropriate pain scale  - Administer analgesics based on type and severity of pain and evaluate response  - Implement non-pharmacological measures as appropriate and evaluate response  - Consider cultural and social influences on pain and pain management  - Notify physician/advanced practitioner if interventions unsuccessful or patient reports new pain  Outcome: Progressing     Problem: INFECTION - ADULT  Goal: Absence or prevention of progression during hospitalization  Description: INTERVENTIONS:  - Assess and monitor for signs and symptoms of infection  - Monitor lab/diagnostic results  - Monitor all insertion sites, i e  indwelling lines, tubes, and drains  - Monitor endotracheal if appropriate and nasal secretions for changes in amount and color  - Ewing appropriate cooling/warming therapies per order  - Administer medications as ordered  - Instruct and encourage patient and family to use good hand hygiene technique  - Identify and instruct in appropriate isolation precautions for identified infection/condition  Outcome: Progressing  Goal: Absence of fever/infection during neutropenic period  Description: INTERVENTIONS:  - Monitor WBC    Outcome: Progressing     Problem: SAFETY ADULT  Goal: Patient will remain free of falls  Description: INTERVENTIONS:  - Educate patient/family on patient safety including physical limitations  - Instruct patient to call for assistance with activity   - Consult OT/PT to assist with strengthening/mobility   - Keep Call bell within reach  - Keep bed low and locked with side rails adjusted as appropriate  - Keep care items and personal belongings within reach  - Initiate and maintain comfort rounds  - Make Fall Risk Sign visible to staff  - Offer Toileting every  Hours, in advance of need  - Initiate/Maintain alarm  - Obtain necessary fall risk management equipment:   - Apply yellow socks and bracelet for high fall risk patients  - Consider moving patient to room near nurses station  Outcome: Progressing  Goal: Maintain or return to baseline ADL function  Description: INTERVENTIONS:  -  Assess patient's ability to carry out ADLs; assess patient's baseline for ADL function and identify physical deficits which impact ability to perform ADLs (bathing, care of mouth/teeth, toileting, grooming, dressing, etc )  - Assess/evaluate cause of self-care deficits   - Assess range of motion  - Assess patient's mobility; develop plan if impaired  - Assess patient's need for assistive devices and provide as appropriate  - Encourage maximum independence but intervene and supervise when necessary  - Involve family in performance of ADLs  - Assess for home care needs following discharge   - Consider OT consult to assist with ADL evaluation and planning for discharge  - Provide patient education as appropriate  Outcome: Progressing  Goal: Maintains/Returns to pre admission functional level  Description: INTERVENTIONS:  - Perform BMAT or MOVE assessment daily    - Set and communicate daily mobility goal to care team and patient/family/caregiver  - Collaborate with rehabilitation services on mobility goals if consulted  - Perform Range of Motion  times a day  - Reposition patient every  hours    - Dangle patient  times a day  - Stand patient  times a day  - Ambulate patient  times a day  - Out of bed to chair  times a day   - Out of bed for meals  times a day  - Out of bed for toileting  - Record patient progress and toleration of activity level   Outcome: Progressing     Problem: DISCHARGE PLANNING  Goal: Discharge to home or other facility with appropriate resources  Description: INTERVENTIONS:  - Identify barriers to discharge w/patient and caregiver  - Arrange for needed discharge resources and transportation as appropriate  - Identify discharge learning needs (meds, wound care, etc )  - Arrange for interpretive services to assist at discharge as needed  - Refer to Case Management Department for coordinating discharge planning if the patient needs post-hospital services based on physician/advanced practitioner order or complex needs related to functional status, cognitive ability, or social support system  Outcome: Progressing     Problem: Knowledge Deficit  Goal: Patient/family/caregiver demonstrates understanding of disease process, treatment plan, medications, and discharge instructions  Description: Complete learning assessment and assess knowledge base    Interventions:  - Provide teaching at level of understanding  - Provide teaching via preferred learning methods  Outcome: Progressing     Problem: SKIN/TISSUE INTEGRITY - ADULT  Goal: Skin Integrity remains intact(Skin Breakdown Prevention)  Description: Assess:  -Perform Michael assessment every   -Clean and moisturize skin every   -Inspect skin when repositioning, toileting, and assisting with ADLS  -Assess under medical devices such as  every   -Assess extremities for adequate circulation and sensation     Bed Management:  -Have minimal linens on bed & keep smooth, unwrinkled  -Change linens as needed when moist or perspiring  -Avoid sitting or lying in one position for more than  hours while in bed  -Keep HOB at degrees     Toileting:  -Offer bedside commode  -Assess for incontinence every   -Use incontinent care products after each incontinent episode such as     Activity:  -Mobilize patient  times a day  -Encourage activity and walks on unit  -Encourage or provide ROM exercises   -Turn and reposition patient every  Hours  -Use appropriate equipment to lift or move patient in bed  -Instruct/ Assist with weight shifting every  when out of bed in chair  -Consider limitation of chair time  hour intervals    Skin Care:  -Avoid use of baby powder, tape, friction and shearing, hot water or constrictive clothing  -Relieve pressure over bony prominences using   -Do not massage red bony areas    Next Steps:  -Teach patient strategies to minimize risks such as    -Consider consults to  interdisciplinary teams such as   Outcome: Progressing  Goal: Incision(s), wounds(s) or drain site(s) healing without S/S of infection  Description: INTERVENTIONS  - Assess and document dressing, incision, wound bed, drain sites and surrounding tissue  - Provide patient and family education  - Perform skin care/dressing changes every   Outcome: Progressing  Goal: Pressure injury heals and does not worsen  Description: Interventions:  - Implement low air loss mattress or specialty surface (Criteria met)  - Apply silicone foam dressing  - Instruct/assist with weight shifting every  minutes when in chair   - Limit chair time to  hour intervals  - Use special pressure reducing interventions such as  when in chair   - Apply fecal or urinary incontinence containment device   - Perform passive or active ROM every   - Turn and reposition patient & offload bony prominences every hours   - Utilize friction reducing device or surface for transfers   - Consider consults to  interdisciplinary teams such as  - Use incontinent care products after each incontinent episode such as   - Consider nutrition services referral as needed  Outcome: Progressing     Problem: Prexisting or High Potential for Compromised Skin Integrity  Goal: Skin integrity is maintained or improved  Description: INTERVENTIONS:  - Identify patients at risk for skin breakdown  - Assess and monitor skin integrity  - Assess and monitor nutrition and hydration status  - Monitor labs   - Assess for incontinence   - Turn and reposition patient  - Assist with mobility/ambulation  - Relieve pressure over bony prominences  - Avoid friction and shearing  - Provide appropriate hygiene as needed including keeping skin clean and dry  - Evaluate need for skin moisturizer/barrier cream  - Collaborate with interdisciplinary team   - Patient/family teaching  - Consider wound care consult   Outcome: Progressing     Problem: MOBILITY - ADULT  Goal: Maintain or return to baseline ADL function  Description: INTERVENTIONS:  -  Assess patient's ability to carry out ADLs; assess patient's baseline for ADL function and identify physical deficits which impact ability to perform ADLs (bathing, care of mouth/teeth, toileting, grooming, dressing, etc )  - Assess/evaluate cause of self-care deficits   - Assess range of motion  - Assess patient's mobility; develop plan if impaired  - Assess patient's need for assistive devices and provide as appropriate  - Encourage maximum independence but intervene and supervise when necessary  - Involve family in performance of ADLs  - Assess for home care needs following discharge   - Consider OT consult to assist with ADL evaluation and planning for discharge  - Provide patient education as appropriate  Outcome: Progressing  Goal: Maintains/Returns to pre admission functional level  Description: INTERVENTIONS:  - Perform BMAT or MOVE assessment daily    - Set and communicate daily mobility goal to care team and patient/family/caregiver  - Collaborate with rehabilitation services on mobility goals if consulted  - Perform Range of Motion  times a day  - Reposition patient every  hours    - Dangle patient  times a day  - Stand patient  times a day  - Ambulate patient  times a day  - Out of bed to chair  times a day   - Out of bed for meals  times a day  - Out of bed for toileting  - Record patient progress and toleration of activity level   Outcome: Progressing

## 2023-02-10 NOTE — PROGRESS NOTES
Gabriela Shore is a 61 y o  male who is currently ordered Vancomycin IV with management by the Pharmacy Consult service  Relevant clinical data and objective / subjective history reviewed  Vancomycin Assessment:  Indication and Goal AUC/Trough: Other, Intra-abdominal sepsis with possible wound infection, -600, trough >10  Clinical Status:  new start  Micro:     Renal Function:  SCr: 1 17 mg/dL  CrCl: 76 6 mL/min  Renal replacement: Not on dialysis  Days of Therapy: 1  Current Dose: 1250mg IV q12h  Vancomycin Plan:  New Dosinmg IV loading dose x1, then 750mg IV q12h   Estimated AUC: 411 mcg*hr/mL  Estimated Trough: 13 6 mcg/mL  Next Level: 23 level AM draw    Renal Function Monitoring: Daily BMP and UOP  Pharmacy will continue to follow closely for s/sx of nephrotoxicity, infusion reactions and appropriateness of therapy  BMP and CBC will be ordered per protocol  We will continue to follow the patient’s culture results and clinical progress daily      [facility], Pharmacist

## 2023-02-10 NOTE — ASSESSMENT & PLAN NOTE
Patient with extensive abdominal surgical history, reports was recently discharged from rehab after prolonged hospitalization several weeks ago  Since then he has had increasing fatigue and generalized weakness, which has been particularly worse over the past several days  Over this timeframe he is also noted the development of foul-smelling drainage and increasing pain from his chronic abdominal wound  Otherwise he reports some mild SOB, and is without other symptoms/complaint at this time    · As evidenced by tachycardia, tachypnea, and leukocytosis  · In setting of purulent abdominal infection  · Procal 1 07  · Lactic intially 2 4; cleared after IVF in ED   · Hx of colon CA w/ extensive abd surgical hx as below   · Patient has several abdominal drains in place, with CT showing improvement in the fluid collections at the left liver lobe, perisplenic area, and the left retroperitoneum inferior to the spleen  · ED gave full 30cc/kg IVF bolus + IV zosyn  · Started on IV meropenem per ID, transitioned to IV ertapenem and IV vancomycin based on wound cultures  · Trend WBC; follow-up with pending infectious labs and cultures  · ID consulted; recommendations appreciated   · S/p bedside midline wound debridement + wet dressing 2/8 with general surgery

## 2023-02-10 NOTE — PROGRESS NOTES
Progress Note - Infectious Disease   Demetris Li 61 y o  male MRN: [id number]  Unit/Bed#: -01 Encounter: [id number]      Impression/Plan:  1   Systemic inflammatory response syndrome   Leukocytosis and tachycardia   Present on admission   Possible sepsis without a well-defined source   The patient CT chest abdomen and pelvis does not reveal any new source of sepsis   Urinalysis nondiagnostic for UTI   The abdominal wound has been debrided   Unclear significance of the extremely high alkaline phosphatase level with the total bilirubin normal and the remainder of the transaminases not proportionally high   Consideration for the possibility of bacteremia  Right upper quadrant ultrasound without biliary ductal dilatation    Wound culture with ESBL and possible MRSA and now with recurrent fever possibly related to the biliary drain that may have been pulled out since yesterday  -Discontinue meropenem  -Begin ertapenem 1 g IV every 24 hours  -Add vancomycin 1 25 g IV every 12 hours  -Consult pharmacy for vancomycin trough management  -Follow-up blood cultures and wound cultures and adjust antibiotics as needed  -Surgery follow-up  -Recheck CBC with differential, CMP, procalcitonin level  -Source control measures as below  -Supportive care     2   Intra-abdominal abscesses   Status post IR drainage with drains remaining in place but with improved findings on repeat CT scan   Patient completed several weeks of intravenous antibiotics   No evidence of a new abscess   -Antibiotics as above for now  -Surgery follow-up  -Serial exams  -Reconsult interventional radiology for possible biliary drain revision     3   ESBL E  coli bacteremia/abscess formation   Status post prolonged course of intravenous antibiotics with clearance of the bacteremia   Abscess is improved as above  -Antibiotics as above for now  -Follow-up blood cultures     4   Abdominal wound   No overt cellulitis or purulence seen although there is some exudate  Possible mild wound infection that is status post debridement by surgery  ESBL and possible MRSA  -Local wound care  -Serial abdominal exams  -Surgery follow-up  -Follow-up wound cultures and adjust antibiotics as needed     5   Metastatic colon cancer   Status post extensive surgical resection with reexploration in the setting of complication     6   Acute kidney injury   Suspect secondary to prerenal issues   No other clear source appreciated   Renal function improving with IV fluid resuscitation   -Recheck BMP  -Volume management  -Dose adjust the antibiotics as needed    Discussed the above management plan with the primary service about addressing the possibly pulled out biliary drain and they will consult interventional radiology    Antibiotics:  Meropenem 3  Antibiotics 4    Subjective:  Patient started having fever over the last 24 hours again; no nausea, vomiting, diarrhea; no cough, shortness of breath; no increased pain  No new symptoms  He is concerned that his biliary drain is pulled out  Objective:  Vitals:  Temp:  [97 2 °F (36 2 °C)-101 °F (38 3 °C)] 98 4 °F (36 9 °C)  HR:  [] 119  Resp:  [16-18] 18  BP: (121-134)/(79-83) 134/82  SpO2:  [96 %-99 %] 96 %  Temp (24hrs), Av 3 °F (37 4 °C), Min:97 2 °F (36 2 °C), Max:101 °F (38 3 °C)  Current: Temperature: 98 4 °F (36 9 °C)    Physical Exam:   General Appearance:  Alert, interactive, nontoxic, no acute distress  Throat: Oropharynx moist without lesions  Lungs:   Clear to auscultation bilaterally; no wheezes, rhonchi or rales; respirations unlabored   Heart:  RRR; no murmur, rub or gallop   Abdomen:   Soft, non-tender, non-distended, positive bowel sounds  Right-sided biliary drain pulled out a bit  Ostomy with output  VICTORINO drain in place with output  Wound dressed with a dry dressing in place  Extremities: No clubbing, cyanosis or edema   Skin: No new rashes or lesions  No draining wounds noted         Labs, Imaging, & Other studies:   All pertinent labs and imaging studies were personally reviewed  Results from last 7 days   Lab Units 02/10/23  0511 02/09/23  0635 02/08/23  0337   WBC Thousand/uL 14 81* 19 39* 21 75*   HEMOGLOBIN g/dL 8 1* 7 5* 8 0*   PLATELETS Thousands/uL 375 366 448*     Results from last 7 days   Lab Units 02/10/23  0511 02/09/23  0635 02/08/23  0337 02/07/23 2014 02/07/23  1530   SODIUM mmol/L 132* 134* 134*   < > 129*   POTASSIUM mmol/L 4 3 4 4 4 8   < > 5 9*   CHLORIDE mmol/L 101 103 102   < > 98   CO2 mmol/L 21 21 21   < > 15*   BUN mg/dL 24 33* 53*   < > 69*   CREATININE mg/dL 1 17 1 47* 1 93*   < > 2 52*   EGFR ml/min/1 73sq m 68 51 37   < > 27   CALCIUM mg/dL 8 4 8 4 8 6   < > 9 4   AST U/L  --  59* 61*  --  62*   ALT U/L  --  17 20  --  26   ALK PHOS U/L  --  925* 1,043*  --  1,300*    < > = values in this interval not displayed  Results from last 7 days   Lab Units 02/07/23  1759 02/07/23  1530   BLOOD CULTURE   --  No Growth at 48 hrs  No Growth at 48 hrs     GRAM STAIN RESULT  3+ Gram positive rods*  1+ Gram positive cocci in chains*  Rare Disintegrating polys*  --    WOUND CULTURE  3+ Growth of Escherichia coli ESBL*  3+ Growth of Staphylococcus aureus*  3+ Growth of  --      Results from last 7 days   Lab Units 02/08/23  0337 02/07/23  1530   PROCALCITONIN ng/ml 1 37* 1 07*

## 2023-02-10 NOTE — SEDATION DOCUMENTATION
Images taken of abdominal drain and choley tube with contrast for Tamela Service NP to review  Left abdominal drain tube is leaking and tender at site but aspirating purulent drainage  Stitch was removed and replaced with stat lock, to return next week for tubes checked

## 2023-02-10 NOTE — PLAN OF CARE
Problem: PAIN - ADULT  Goal: Verbalizes/displays adequate comfort level or baseline comfort level  Description: Interventions:  - Encourage patient to monitor pain and request assistance  - Assess pain using appropriate pain scale  - Administer analgesics based on type and severity of pain and evaluate response  - Implement non-pharmacological measures as appropriate and evaluate response  - Consider cultural and social influences on pain and pain management  - Notify physician/advanced practitioner if interventions unsuccessful or patient reports new pain  Outcome: Progressing     Problem: INFECTION - ADULT  Goal: Absence or prevention of progression during hospitalization  Description: INTERVENTIONS:  - Assess and monitor for signs and symptoms of infection  - Monitor lab/diagnostic results  - Monitor all insertion sites, i e  indwelling lines, tubes, and drains  - Monitor endotracheal if appropriate and nasal secretions for changes in amount and color  - [place] appropriate cooling/warming therapies per order  - Administer medications as ordered  - Instruct and encourage patient and family to use good hand hygiene technique  - Identify and instruct in appropriate isolation precautions for identified infection/condition  Outcome: Progressing  Goal: Absence of fever/infection during neutropenic period  Description: INTERVENTIONS:  - Monitor WBC    Outcome: Progressing     Problem: SAFETY ADULT  Goal: Patient will remain free of falls  Description: INTERVENTIONS:  - Educate patient/family on patient safety including physical limitations  - Instruct patient to call for assistance with activity   - Consult OT/PT to assist with strengthening/mobility   - Keep Call bell within reach  - Keep bed low and locked with side rails adjusted as appropriate  - Keep care items and personal belongings within reach  - Initiate and maintain comfort rounds  - Make Fall Risk Sign visible to staff  - Offer Toileting every  Hours, in advance of need  - Initiate/Maintain alarm  - Obtain necessary fall risk management equipment:   - Apply yellow socks and bracelet for high fall risk patients  - Consider moving patient to room near nurses station  Outcome: Progressing  Goal: Maintain or return to baseline ADL function  Description: INTERVENTIONS:  -  Assess patient's ability to carry out ADLs; assess patient's baseline for ADL function and identify physical deficits which impact ability to perform ADLs (bathing, care of mouth/teeth, toileting, grooming, dressing, etc )  - Assess/evaluate cause of self-care deficits   - Assess range of motion  - Assess patient's mobility; develop plan if impaired  - Assess patient's need for assistive devices and provide as appropriate  - Encourage maximum independence but intervene and supervise when necessary  - Involve family in performance of ADLs  - Assess for home care needs following discharge   - Consider OT consult to assist with ADL evaluation and planning for discharge  - Provide patient education as appropriate  Outcome: Progressing  Goal: Maintains/Returns to pre admission functional level  Description: INTERVENTIONS:  - Perform BMAT or MOVE assessment daily    - Set and communicate daily mobility goal to care team and patient/family/caregiver  - Collaborate with rehabilitation services on mobility goals if consulted  - Perform Range of Motion  times a day  - Reposition patient every  hours    - Dangle patient  times a day  - Stand patient  times a day  - Ambulate patient  times a day  - Out of bed to chair  times a day   - Out of bed for meals  times a day  - Out of bed for toileting  - Record patient progress and toleration of activity level   Outcome: Progressing     Problem: DISCHARGE PLANNING  Goal: Discharge to home or other facility with appropriate resources  Description: INTERVENTIONS:  - Identify barriers to discharge w/patient and caregiver  - Arrange for needed discharge resources and transportation as appropriate  - Identify discharge learning needs (meds, wound care, etc )  - Arrange for interpretive services to assist at discharge as needed  - Refer to Case Management Department for coordinating discharge planning if the patient needs post-hospital services based on physician/advanced practitioner order or complex needs related to functional status, cognitive ability, or social support system  Outcome: Progressing     Problem: Knowledge Deficit  Goal: Patient/family/caregiver demonstrates understanding of disease process, treatment plan, medications, and discharge instructions  Description: Complete learning assessment and assess knowledge base    Interventions:  - Provide teaching at level of understanding  - Provide teaching via preferred learning methods  Outcome: Progressing     Problem: SKIN/TISSUE INTEGRITY - ADULT  Goal: Skin Integrity remains intact(Skin Breakdown Prevention)  Description: Assess:  -Perform Michael assessment every   -Clean and moisturize skin every   -Inspect skin when repositioning, toileting, and assisting with ADLS  -Assess under medical devices such as  every   -Assess extremities for adequate circulation and sensation     Bed Management:  -Have minimal linens on bed & keep smooth, unwrinkled  -Change linens as needed when moist or perspiring  -Avoid sitting or lying in one position for more than  hours while in bed  -Keep HOB at degrees     Toileting:  -Offer bedside commode  -Assess for incontinence every   -Use incontinent care products after each incontinent episode such as     Activity:  -Mobilize patient times a day  -Encourage activity and walks on unit  -Encourage or provide ROM exercises   -Turn and reposition patient every  Hours  -Use appropriate equipment to lift or move patient in bed  -Instruct/ Assist with weight shifting every  when out of bed in chair  -Consider limitation of chair time  hour intervals    Skin Care:  -Avoid use of baby powder, tape, friction and shearing, hot water or constrictive clothing  -Relieve pressure over bony prominences using   -Do not massage red bony areas    Next Steps:  -Teach patient strategies to minimize risks such as    -Consider consults to  interdisciplinary teams such as   Outcome: Progressing  Goal: Incision(s), wounds(s) or drain site(s) healing without S/S of infection  Description: INTERVENTIONS  - Assess and document dressing, incision, wound bed, drain sites and surrounding tissue  - Provide patient and family education  - Perform skin care/dressing changes every   Outcome: Progressing  Goal: Pressure injury heals and does not worsen  Description: Interventions:  - Implement low air loss mattress or specialty surface (Criteria met)  - Apply silicone foam dressing  - Instruct/assist with weight shifting every  minutes when in chair   - Limit chair time to  hour intervals  - Use special pressure reducing interventions such as  when in chair   - Apply fecal or urinary incontinence containment device   - Perform passive or active ROM every   - Turn and reposition patient & offload bony prominences every  hours   - Utilize friction reducing device or surface for transfers   - Consider consults to  interdisciplinary teams such as   - Use incontinent care products after each incontinent episode such as   - Consider nutrition services referral as needed  Outcome: Progressing     Problem: Prexisting or High Potential for Compromised Skin Integrity  Goal: Skin integrity is maintained or improved  Description: INTERVENTIONS:  - Identify patients at risk for skin breakdown  - Assess and monitor skin integrity  - Assess and monitor nutrition and hydration status  - Monitor labs   - Assess for incontinence   - Turn and reposition patient  - Assist with mobility/ambulation  - Relieve pressure over bony prominences  - Avoid friction and shearing  - Provide appropriate hygiene as needed including keeping skin clean and dry  - Evaluate need for skin moisturizer/barrier cream  - Collaborate with interdisciplinary team   - Patient/family teaching  - Consider wound care consult   Outcome: Progressing     Problem: MOBILITY - ADULT  Goal: Maintain or return to baseline ADL function  Description: INTERVENTIONS:  -  Assess patient's ability to carry out ADLs; assess patient's baseline for ADL function and identify physical deficits which impact ability to perform ADLs (bathing, care of mouth/teeth, toileting, grooming, dressing, etc )  - Assess/evaluate cause of self-care deficits   - Assess range of motion  - Assess patient's mobility; develop plan if impaired  - Assess patient's need for assistive devices and provide as appropriate  - Encourage maximum independence but intervene and supervise when necessary  - Involve family in performance of ADLs  - Assess for home care needs following discharge   - Consider OT consult to assist with ADL evaluation and planning for discharge  - Provide patient education as appropriate  Outcome: Progressing  Goal: Maintains/Returns to pre admission functional level  Description: INTERVENTIONS:  - Perform BMAT or MOVE assessment daily    - Set and communicate daily mobility goal to care team and patient/family/caregiver  - Collaborate with rehabilitation services on mobility goals if consulted  - Perform Range of Motion  times a day  - Reposition patient every  hours    - Dangle patient  times a day  - Stand patient  times a day  - Ambulate patient  times a day  - Out of bed to chair  times a day   - Out of bed for meals  times a day  - Out of bed for toileting  - Record patient progress and toleration of activity level   Outcome: Progressing

## 2023-02-10 NOTE — PHYSICAL THERAPY NOTE
Physical Therapy Cancellation Note    Chart review performed  Attempted to see patient for physical therapy treatment session  Pt off the floor  PT to continue to follow and treat as appropriate       02/10/23 1030   PT Last Visit   PT Visit Date 02/10/23   Note Type   Note Type Treatment   Cancel Reasons Patient off floor/test       Lula Rodriguez, PT, DPT

## 2023-02-10 NOTE — BRIEF OP NOTE (RAD/CATH)
thIR DRAINAGE TUBE CHECK/CHANGE/REPOSITION/REINSERTION/UPSIZE Procedure Note    PATIENT NAME: Lauryn Young  : 1965  MRN: [id number]    Pre-op Diagnosis:   1  Sepsis ([address] )    2  Wound infection    3  Acute kidney injury ([facility] [address] )    4  Sepsis, due to unspecified organism, unspecified whether acute organ dysfunction present ([facility] [address] )    5  Acute renal failure superimposed on stage 3a chronic kidney disease, unspecified acute renal failure type ([facility] [address] )    6  Colostomy prolapse ([facility] [address] )    7  Colon cancer metastasized to liver ([facility] [address] )    8  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)      Post-op Diagnosis:   1  Sepsis ([facility] [address] )    2  Wound infection    3  Acute kidney injury ([facility] [address] )    4  Sepsis, due to unspecified organism, unspecified whether acute organ dysfunction present ([facility] [address] )    5  Acute renal failure superimposed on stage 3a chronic kidney disease, unspecified acute renal failure type ([facility] [address] )    6  Colostomy prolapse ([facility] [address] )    7  Colon cancer metastasized to liver ([facility] [address] )    8  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 liver [facility])        Surgeon:   Dahiana Womack, Neptali Redding  Assistants:     No qualified resident was available, Resident is only observing    Estimated Blood Loss: none  Findings: left chest wall 12 fr drain suture removed, was imbedded into skin and causing discomfort, skin excoriated  Replaced with a stat lock  Abscess has decreased, drain remains in place  Flush with 5 ml NSS daily and record output  Will recheck in one week  Alvina tube checked, remains in appropriate position  Small amount of contrast thru the cystic duct  Will continue alvina tube to drainage bag  Flush daily with 10 ml NSS and record output  Will recheck in one week      Specimens: none    Complications:  None immediate    Anesthesia: none    [address]     Date: 2/10/2023  Time: 11:18 AM

## 2023-02-10 NOTE — QUICK NOTE
Confirmed with hospitalist service that it is the cholecystostomy tube that they would like evaluated today because of decreased output

## 2023-02-11 LAB
ANION GAP SERPL CALCULATED.3IONS-SCNC: 12 MMOL/L (ref 4–13)
BACTERIA WND AEROBE CULT: ABNORMAL
BASOPHILS # BLD AUTO: 0.01 THOUSANDS/ÂΜL (ref 0–0.1)
BASOPHILS NFR BLD AUTO: 0 % (ref 0–1)
BUN SERPL-MCNC: 12 MG/DL (ref 5–25)
CALCIUM SERPL-MCNC: 7.1 MG/DL (ref 8.3–10.1)
CHLORIDE SERPL-SCNC: 97 MMOL/L (ref 96–108)
CO2 SERPL-SCNC: 22 MMOL/L (ref 21–32)
CREAT SERPL-MCNC: 0.84 MG/DL (ref 0.6–1.3)
EOSINOPHIL # BLD AUTO: 0 THOUSAND/ÂΜL (ref 0–0.61)
EOSINOPHIL NFR BLD AUTO: 0 % (ref 0–6)
ERYTHROCYTE [DISTWIDTH] IN BLOOD BY AUTOMATED COUNT: 17.5 % (ref 11.6–15.1)
GFR SERPL CREATININE-BSD FRML MDRD: 96 ML/MIN/1.73SQ M
GLUCOSE SERPL-MCNC: 109 MG/DL (ref 65–140)
GLUCOSE SERPL-MCNC: 119 MG/DL (ref 65–140)
GLUCOSE SERPL-MCNC: 123 MG/DL (ref 65–140)
GLUCOSE SERPL-MCNC: 144 MG/DL (ref 65–140)
GLUCOSE SERPL-MCNC: 165 MG/DL (ref 65–140)
GRAM STN SPEC: ABNORMAL
HCT VFR BLD AUTO: 23.9 % (ref 36.5–49.3)
HGB BLD-MCNC: 7.4 G/DL (ref 12–17)
IMM GRANULOCYTES # BLD AUTO: 0.21 THOUSAND/UL (ref 0–0.2)
IMM GRANULOCYTES NFR BLD AUTO: 3 % (ref 0–2)
LYMPHOCYTES # BLD AUTO: 0.78 THOUSANDS/ÂΜL (ref 0.6–4.47)
LYMPHOCYTES NFR BLD AUTO: 10 % (ref 14–44)
MCH RBC QN AUTO: 26 PG (ref 26.8–34.3)
MCHC RBC AUTO-ENTMCNC: 31 G/DL (ref 31.4–37.4)
MCV RBC AUTO: 84 FL (ref 82–98)
MONOCYTES # BLD AUTO: 0.79 THOUSAND/ÂΜL (ref 0.17–1.22)
MONOCYTES NFR BLD AUTO: 10 % (ref 4–12)
NEUTROPHILS # BLD AUTO: 6.31 THOUSANDS/ÂΜL (ref 1.85–7.62)
NEUTS SEG NFR BLD AUTO: 77 % (ref 43–75)
NRBC BLD AUTO-RTO: 0 /100 WBCS
PLATELET # BLD AUTO: 273 THOUSANDS/UL (ref 149–390)
PMV BLD AUTO: 9 FL (ref 8.9–12.7)
POTASSIUM SERPL-SCNC: 4.1 MMOL/L (ref 3.5–5.3)
PROCALCITONIN SERPL-MCNC: 0.61 NG/ML
RBC # BLD AUTO: 2.85 MILLION/UL (ref 3.88–5.62)
SODIUM SERPL-SCNC: 131 MMOL/L (ref 135–147)
WBC # BLD AUTO: 8.1 THOUSAND/UL (ref 4.31–10.16)

## 2023-02-11 RX ORDER — VANCOMYCIN HYDROCHLORIDE 1 G/200ML
1000 INJECTION, SOLUTION INTRAVENOUS EVERY 12 HOURS
Status: DISCONTINUED | OUTPATIENT
Start: 2023-02-11 | End: 2023-02-12

## 2023-02-11 RX ADMIN — INSULIN GLARGINE 5 UNITS: 100 INJECTION, SOLUTION SUBCUTANEOUS at 21:29

## 2023-02-11 RX ADMIN — VANCOMYCIN HYDROCHLORIDE 1000 MG: 1 INJECTION, SOLUTION INTRAVENOUS at 10:30

## 2023-02-11 RX ADMIN — INSULIN LISPRO 1 UNITS: 100 INJECTION, SOLUTION INTRAVENOUS; SUBCUTANEOUS at 11:46

## 2023-02-11 RX ADMIN — ATORVASTATIN CALCIUM 40 MG: 40 TABLET, FILM COATED ORAL at 09:48

## 2023-02-11 RX ADMIN — ONDANSETRON 4 MG: 2 INJECTION INTRAMUSCULAR; INTRAVENOUS at 21:29

## 2023-02-11 RX ADMIN — PANTOPRAZOLE SODIUM 40 MG: 40 TABLET, DELAYED RELEASE ORAL at 18:41

## 2023-02-11 RX ADMIN — HEPARIN SODIUM 5000 UNITS: 5000 INJECTION INTRAVENOUS; SUBCUTANEOUS at 15:00

## 2023-02-11 RX ADMIN — ASPIRIN 81 MG 81 MG: 81 TABLET ORAL at 09:48

## 2023-02-11 RX ADMIN — HEPARIN SODIUM 5000 UNITS: 5000 INJECTION INTRAVENOUS; SUBCUTANEOUS at 05:12

## 2023-02-11 RX ADMIN — SODIUM CHLORIDE, PRESERVATIVE FREE 10 ML: 5 INJECTION INTRAVENOUS at 09:51

## 2023-02-11 RX ADMIN — AMLODIPINE BESYLATE 2.5 MG: 2.5 TABLET ORAL at 09:48

## 2023-02-11 RX ADMIN — ONDANSETRON 4 MG: 2 INJECTION INTRAMUSCULAR; INTRAVENOUS at 15:24

## 2023-02-11 RX ADMIN — INSULIN LISPRO 4 UNITS: 100 INJECTION, SOLUTION INTRAVENOUS; SUBCUTANEOUS at 07:50

## 2023-02-11 RX ADMIN — INSULIN LISPRO 4 UNITS: 100 INJECTION, SOLUTION INTRAVENOUS; SUBCUTANEOUS at 11:46

## 2023-02-11 RX ADMIN — SIMETHICONE 80 MG: 80 TABLET, CHEWABLE ORAL at 09:52

## 2023-02-11 RX ADMIN — HYDROMORPHONE HYDROCHLORIDE 0.2 MG: 0.2 INJECTION, SOLUTION INTRAMUSCULAR; INTRAVENOUS; SUBCUTANEOUS at 22:24

## 2023-02-11 RX ADMIN — ERTAPENEM SODIUM 1000 MG: 1 INJECTION, POWDER, LYOPHILIZED, FOR SOLUTION INTRAMUSCULAR; INTRAVENOUS at 09:50

## 2023-02-11 RX ADMIN — Medication 6 MG: at 22:24

## 2023-02-11 RX ADMIN — INSULIN LISPRO 4 UNITS: 100 INJECTION, SOLUTION INTRAVENOUS; SUBCUTANEOUS at 16:15

## 2023-02-11 RX ADMIN — SIMETHICONE 80 MG: 80 TABLET, CHEWABLE ORAL at 18:41

## 2023-02-11 RX ADMIN — SIMETHICONE 80 MG: 80 TABLET, CHEWABLE ORAL at 21:29

## 2023-02-11 RX ADMIN — COLLAGENASE SANTYL: 250 OINTMENT TOPICAL at 09:48

## 2023-02-11 RX ADMIN — SIMETHICONE 80 MG: 80 TABLET, CHEWABLE ORAL at 15:00

## 2023-02-11 RX ADMIN — PANTOPRAZOLE SODIUM 40 MG: 40 TABLET, DELAYED RELEASE ORAL at 09:48

## 2023-02-11 RX ADMIN — VANCOMYCIN HYDROCHLORIDE 1000 MG: 1 INJECTION, SOLUTION INTRAVENOUS at 22:24

## 2023-02-11 RX ADMIN — DULOXETINE HYDROCHLORIDE 30 MG: 30 CAPSULE, DELAYED RELEASE ORAL at 09:48

## 2023-02-11 RX ADMIN — TAMSULOSIN HYDROCHLORIDE 0.4 MG: 0.4 CAPSULE ORAL at 18:41

## 2023-02-11 RX ADMIN — OXYCODONE HYDROCHLORIDE 5 MG: 5 TABLET ORAL at 21:28

## 2023-02-11 RX ADMIN — HYDROMORPHONE HYDROCHLORIDE 0.2 MG: 0.2 INJECTION, SOLUTION INTRAMUSCULAR; INTRAVENOUS; SUBCUTANEOUS at 00:11

## 2023-02-11 NOTE — ASSESSMENT & PLAN NOTE
Follows w/ outpatient [facility] onc and palliative care   Extensive abdominal surgical history (11/7 ex lap w/ section 3 liver resection, and development of left upper quadrant hematoma and suspected leak from transverse colon anastomosis complication; 62/83 right hemicolectomy; 11/17 re-exploration w/partial descending colectomy; 12/8 IR percutaneous cholecystostomy tube and hepatectomy abscess drainage; 12/20 IR drains placed for perisplenic abscess)  · Patient does not wish to follow with [facility] oncology anymore as it is too far of a drive; would like to establish with [place] [id number] oncology (already follows with Dr Ciro Falk)  · Continue outpatient f/u

## 2023-02-11 NOTE — PLAN OF CARE
Problem: PAIN - ADULT  Goal: Verbalizes/displays adequate comfort level or baseline comfort level  Description: Interventions:  - Encourage patient to monitor pain and request assistance  - Assess pain using appropriate pain scale  - Administer analgesics based on type and severity of pain and evaluate response  - Implement non-pharmacological measures as appropriate and evaluate response  - Consider cultural and social influences on pain and pain management  - Notify physician/advanced practitioner if interventions unsuccessful or patient reports new pain  Outcome: Progressing     Problem: INFECTION - ADULT  Goal: Absence or prevention of progression during hospitalization  Description: INTERVENTIONS:  - Assess and monitor for signs and symptoms of infection  - Monitor lab/diagnostic results  - Monitor all insertion sites, i e  indwelling lines, tubes, and drains  - Monitor endotracheal if appropriate and nasal secretions for changes in amount and color  - [place] appropriate cooling/warming therapies per order  - Administer medications as ordered  - Instruct and encourage patient and family to use good hand hygiene technique  - Identify and instruct in appropriate isolation precautions for identified infection/condition  Outcome: Progressing  Goal: Absence of fever/infection during neutropenic period  Description: INTERVENTIONS:  - Monitor WBC    Outcome: Progressing     Problem: SAFETY ADULT  Goal: Patient will remain free of falls  Description: INTERVENTIONS:  - Educate patient/family on patient safety including physical limitations  - Instruct patient to call for assistance with activity   - Consult OT/PT to assist with strengthening/mobility   - Keep Call bell within reach  - Keep bed low and locked with side rails adjusted as appropriate  - Keep care items and personal belongings within reach  - Initiate and maintain comfort rounds  - Make Fall Risk Sign visible to staff  - Offer Toileting every  Hours, in advance of need  - Initiate/Maintain alarm  - Obtain necessary fall risk management equipment:   - Apply yellow socks and bracelet for high fall risk patients  - Consider moving patient to room near nurses station  Outcome: Progressing  Goal: Maintain or return to baseline ADL function  Description: INTERVENTIONS:  -  Assess patient's ability to carry out ADLs; assess patient's baseline for ADL function and identify physical deficits which impact ability to perform ADLs (bathing, care of mouth/teeth, toileting, grooming, dressing, etc )  - Assess/evaluate cause of self-care deficits   - Assess range of motion  - Assess patient's mobility; develop plan if impaired  - Assess patient's need for assistive devices and provide as appropriate  - Encourage maximum independence but intervene and supervise when necessary  - Involve family in performance of ADLs  - Assess for home care needs following discharge   - Consider OT consult to assist with ADL evaluation and planning for discharge  - Provide patient education as appropriate  Outcome: Progressing  Goal: Maintains/Returns to pre admission functional level  Description: INTERVENTIONS:  - Perform BMAT or MOVE assessment daily    - Set and communicate daily mobility goal to care team and patient/family/caregiver  - Collaborate with rehabilitation services on mobility goals if consulted  - Perform Range of Motion  times a day  - Reposition patient every  hours    - Dangle patient  times a day  - Stand patient  times a day  - Ambulate patient  times a day  - Out of bed to chair times a day   - Out of bed for meals  times a day  - Out of bed for toileting  - Record patient progress and toleration of activity level   Outcome: Progressing     Problem: DISCHARGE PLANNING  Goal: Discharge to home or other facility with appropriate resources  Description: INTERVENTIONS:  - Identify barriers to discharge w/patient and caregiver  - Arrange for needed discharge resources and transportation as appropriate  - Identify discharge learning needs (meds, wound care, etc )  - Arrange for interpretive services to assist at discharge as needed  - Refer to Case Management Department for coordinating discharge planning if the patient needs post-hospital services based on physician/advanced practitioner order or complex needs related to functional status, cognitive ability, or social support system  Outcome: Progressing     Problem: Knowledge Deficit  Goal: Patient/family/caregiver demonstrates understanding of disease process, treatment plan, medications, and discharge instructions  Description: Complete learning assessment and assess knowledge base    Interventions:  - Provide teaching at level of understanding  - Provide teaching via preferred learning methods  Outcome: Progressing     Problem: SKIN/TISSUE INTEGRITY - ADULT  Goal: Skin Integrity remains intact(Skin Breakdown Prevention)  Description: Assess:  -Perform Michael assessment every   -Clean and moisturize skin every   -Inspect skin when repositioning, toileting, and assisting with ADLS  -Assess under medical devices such as  every   -Assess extremities for adequate circulation and sensation     Bed Management:  -Have minimal linens on bed & keep smooth, unwrinkled  -Change linens as needed when moist or perspiring  -Avoid sitting or lying in one position for more than  hours while in bed  -Keep HOB at degrees     Toileting:  -Offer bedside commode  -Assess for incontinence every   -Use incontinent care products after each incontinent episode such as     Activity:  -Mobilize patient times a day  -Encourage activity and walks on unit  -Encourage or provide ROM exercises   -Turn and reposition patient every  Hours  -Use appropriate equipment to lift or move patient in bed  -Instruct/ Assist with weight shifting every  when out of bed in chair  -Consider limitation of chair time  hour intervals    Skin Care:  -Avoid use of baby powder, tape, friction and shearing, hot water or constrictive clothing  -Relieve pressure over bony prominences using   -Do not massage red bony areas    Next Steps:  -Teach patient strategies to minimize risks such as    -Consider consults to  interdisciplinary teams such as   Outcome: Progressing  Goal: Incision(s), wounds(s) or drain site(s) healing without S/S of infection  Description: INTERVENTIONS  - Assess and document dressing, incision, wound bed, drain sites and surrounding tissue  - Provide patient and family education  - Perform skin care/dressing changes every   Outcome: Progressing  Goal: Pressure injury heals and does not worsen  Description: Interventions:  - Implement low air loss mattress or specialty surface (Criteria met)  - Apply silicone foam dressing  - Instruct/assist with weight shifting every  minutes when in chair   - Limit chair time to  hour intervals  - Use special pressure reducing interventions such as  when in chair   - Apply fecal or urinary incontinence containment device   - Perform passive or active ROM every   - Turn and reposition patient & offload bony prominences every  hours   - Utilize friction reducing device or surface for transfers   - Consider consults to  interdisciplinary teams such as   - Use incontinent care products after each incontinent episode such as   - Consider nutrition services referral as needed  Outcome: Progressing     Problem: Prexisting or High Potential for Compromised Skin Integrity  Goal: Skin integrity is maintained or improved  Description: INTERVENTIONS:  - Identify patients at risk for skin breakdown  - Assess and monitor skin integrity  - Assess and monitor nutrition and hydration status  - Monitor labs   - Assess for incontinence   - Turn and reposition patient  - Assist with mobility/ambulation  - Relieve pressure over bony prominences  - Avoid friction and shearing  - Provide appropriate hygiene as needed including keeping skin clean and dry  - Evaluate need for skin moisturizer/barrier cream  - Collaborate with interdisciplinary team   - Patient/family teaching  - Consider wound care consult   Outcome: Progressing     Problem: MOBILITY - ADULT  Goal: Maintain or return to baseline ADL function  Description: INTERVENTIONS:  -  Assess patient's ability to carry out ADLs; assess patient's baseline for ADL function and identify physical deficits which impact ability to perform ADLs (bathing, care of mouth/teeth, toileting, grooming, dressing, etc )  - Assess/evaluate cause of self-care deficits   - Assess range of motion  - Assess patient's mobility; develop plan if impaired  - Assess patient's need for assistive devices and provide as appropriate  - Encourage maximum independence but intervene and supervise when necessary  - Involve family in performance of ADLs  - Assess for home care needs following discharge   - Consider OT consult to assist with ADL evaluation and planning for discharge  - Provide patient education as appropriate  Outcome: Progressing  Goal: Maintains/Returns to pre admission functional level  Description: INTERVENTIONS:  - Perform BMAT or MOVE assessment daily    - Set and communicate daily mobility goal to care team and patient/family/caregiver  - Collaborate with rehabilitation services on mobility goals if consulted  - Perform Range of Motion  times a day  - Reposition patient every  hours    - Dangle patient  times a day  - Stand patient times a day  - Ambulate patient times a day  - Out of bed to chair  times a day   - Out of bed for meals  times a day  - Out of bed for toileting  - Record patient progress and toleration of activity level   Outcome: Progressing

## 2023-02-11 NOTE — ASSESSMENT & PLAN NOTE
RUQ US revealed 7 mm nonobstructing right intrarenal calculus  No hydronephrosis noted on CT a/p  Asymptomatic at this time     · S/p  IV fluids  · flomax  · Strain urine  · Intake and output

## 2023-02-11 NOTE — CASE MANAGEMENT
Case Management Assessment & Discharge Planning Note    Patient name Catracho Ledesma  Location /-06 MRN [id number]  : 1965 Date 2023       Current Admission Date: 2023  Current Admission Diagnosis:Sepsis [facility])   Patient Active Problem List    Diagnosis Date Noted   • Nephrolithiasis 2023   • Abnormal CT scan 2023   • Hyperkalemia 2023   • Hyponatremia 2023   • Dehiscence of incision 2022   • Elevated alkaline phosphatase level 2022   • Leukocytosis 2022   • Abscess 2022   • Acute pain 2022   • Sepsis ([address] ) 2022   • Severe protein-calorie malnutrition ([address] ) 2022   • Acute on chronic kidney failure ([address] ) 2022   • MR (mitral regurgitation) 2022   • ESBL (extended spectrum beta-lactamase) producing bacteria infection 2022   • Encephalopathy 2022   • Cervical radiculopathy 10/26/2022   • Other fatigue 06/15/2022   • Colostomy prolapse ([address] ) 2022   • Colon cancer metastasized to liver ([facility] ) 2022   • 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 liver ([facility] ) 2022   • Iron deficiency anemia, unspecified 2022   • Malignant neoplasm of transverse colon ([facility] ) 2022   • Thrombocytosis 2022   • Hypokalemia 2022   • Transaminitis 2022   • Type 2 diabetes mellitus with hyperlipidemia ([address] ) 2022   • Microcytic anemia 2022   • Left ureteral calculus 2020   • Incarcerated umbilical hernia    • Testicular hypogonadism 2017   • Low testosterone 2017   • Type 2 diabetes mellitus without complication, with long-term current use of insulin ([facility] ) 2016   • Benign essential hypertension 2016   • Mixed hyperlipidemia 2016   • Erectile dysfunction 2016   • Obesity (BMI 30-39 9) 2016      LOS (days): 4  Geometric Mean LOS (GMLOS) (days):   Days to GMLOS:     OBJECTIVE:    Risk of Unplanned Readmission Score: 42 41 Current admission status: Inpatient       Preferred Pharmacy:   [facility]/pharmacy #[id number]- [facility] [place], [place] - [address] [facility] [place] [address]  Phone: [phone] Fax: 209 [address] [facility] [address] [address]  Phone: [phone] Fax: 1305 [address] [place], [place] - [address]  [address] [facility] [address]  Phone: [phone] Fax: [phone]    Primary Care Provider: James Youngblood MD    Primary Insurance: CHERELLE  Secondary Insurance:     ASSESSMENT:  Luis Felipe Roy Proxies    There are no active Health Care Proxies on file  Advance Directives  Does patient have a [address]?: No  Does patient have Advance Directives?: No              Patient Information  Admitted from[de-identified] Home  Mental Status: Alert  Assessment information provided by[de-identified] Spouse  Primary Caregiver: Spouse  Caregiver's Name[de-identified] Roxane Denis Relationship to Patient[de-identified] Family Member  Caregiver's Telephone Number[de-identified] [phone]  Support Systems: Spouse/significant other, Children  Home entry access options   Select all that apply : Stairs  Number of steps to enter home : 2  Type of Current Residence: [address] [place] home  Upon entering residence, is there a bedroom on the main floor (no further steps)?: No  A bedroom is located on the following floor levels of residence (select all that apply):: 2nd Floor  Upon entering residence, is there a bathroom on the main floor (no further steps)?: Yes  Number of steps to 2nd floor from main floor: One Flight  In the last 12 months, was there a time when you were not able to pay the mortgage or rent on time?: No  In the last 12 months, how many places have you lived?: 1  In the last 12 months, was there a time when you did not have a steady place to sleep or slept in a shelter (including now)?: No  Living Arrangements: Lives w/ Spouse/significant other    Activities of Daily Living Prior to Admission  Functional Status: Assistance  Completes ADLs independently?: No  Level of ADL dependence: Assistance  Ambulates independently?: Yes  Does patient use assisted devices?: Yes  Assisted Devices (DME) used: Jennifer Lea, Wheelchair  Does patient currently own DME?: Yes  What DME does the patient currently own?: Jennifer Lea, Wheelchair  Does patient have a history of Outpatient Therapy (PT/OT)?: No  Does the patient have a history of Short-Term Rehab?: Yes ([facility])  Does patient have a history of HHC?: Yes (Traditional)         Patient Information Continued  Income Source: SSI/SSD  Does patient have prescription coverage?: Yes  Within the past 12 months, you worried that your food would run out before you got the money to buy more : Never true  Within the past 12 months, the food you bought just didn't last and you didn't have money to get more : Never true  Does patient receive dialysis treatments?: No  Does patient have a history of substance abuse?: No  Does patient have a history of Mental Health Diagnosis?: No         Means of Transportation  Means of Transport to Appts[de-identified] Family transport  In the past 12 months, has lack of transportation kept you from medical appointments or from getting medications?: No  In the past 12 months, has lack of transportation kept you from meetings, work, or from getting things needed for daily living?: No        DISCHARGE DETAILS:    Discharge planning discussed with[de-identified] wife  Freedom of Choice: Yes                   Contacts  Patient Contacts: Марина Link (spouse)  Relationship to Patient[de-identified] Family  Contact Method: Phone  Phone Number: [phone]  Reason/Outcome: Continuity of Care, Emergency Contact, Discharge Planning                   Would you like to participate in our [address] service program?  : No - Declined     CM reviewed d/c planning process including the following: identifying help at home, patient preference for d/c planning needs, Discharge Lounge, [facility] Meds to Bed program, availability of treatment team to discuss questions or concerns patient and/or family may have regarding understanding medications and recognizing signs and symptoms once discharged  CM also encouraged patient to follow up with all recommended appointments after discharge  Patient advised of importance for patient and family to participate in managing patient’s medical well being  Patient/caregiver received discharge checklist   Content reviewed  Patient/caregiver encouraged to participate in discharge plan of care prior to discharge home

## 2023-02-11 NOTE — ASSESSMENT & PLAN NOTE
· Sodium 129 on ED presentation, resolved  · Suspected to be in the setting of dehydration  · AM BMP recheck

## 2023-02-11 NOTE — ASSESSMENT & PLAN NOTE
Patient with extensive abdominal surgical history, reports was recently discharged from rehab after prolonged hospitalization several weeks ago  Since then he has had increasing fatigue and generalized weakness, which has been particularly worse over the past several days  Over this timeframe he is also noted the development of foul-smelling drainage and increasing pain from his chronic abdominal wound  Otherwise he reports some mild SOB, and is without other symptoms/complaint at this time    · As evidenced by tachycardia, tachypnea, and leukocytosis  · In setting of purulent abdominal infection  · Procal 1 07  · Lactic intially 2 4; cleared after IVF in ED   · Hx of colon CA w/ extensive abd surgical hx as below   · Patient has several abdominal drains in place, with CT showing improvement in the fluid collections at the left liver lobe, perisplenic area, and the left retroperitoneum inferior to the spleen  · ED gave full 30cc/kg IVF bolus + IV zosyn  · Started on IV meropenem per ID, transitioned to IV ertapenem and IV vancomycin based on wound cultures- now growing ESBL and MRSA  · Trend WBC; follow-up with pending infectious labs and cultures  · ID consulted; recommendations appreciated   · S/p bedside midline wound debridement + wet dressing 2/8 with general surgery

## 2023-02-11 NOTE — ASSESSMENT & PLAN NOTE
Lab Results   Component Value Date    EGFR 96 02/11/2023    EGFR 68 02/10/2023    EGFR 51 02/09/2023    CREATININE 0 84 02/11/2023    CREATININE 1 17 02/10/2023    CREATININE 1 47 (H) 02/09/2023     · Creatinine 2 5 to; baseline 1 0, improved to 0 84 today  · Suspecting possible multifactorial cause in setting of dehydration and sepsis  · Avoid nephrotoxic agents  · Continue with IV fluids  · AM BMP

## 2023-02-11 NOTE — PROGRESS NOTES
[address] [facility]  Progress Note - Darrion Grant 1965, 61 y o  male MRN: [id number]  Unit/Bed#: -01 Encounter: [id number]  Primary Care Provider: Elisa Flores MD   Date and time admitted to hospital: 2/7/2023  3:01 PM    Nephrolithiasis  [address] revealed 7 mm nonobstructing right intrarenal calculus  No hydronephrosis noted on CT a/p  Asymptomatic at this time  · S/p  IV fluids  · flomax  · Strain urine  · Intake and output    Hyponatremia  Assessment & Plan  · Sodium 129 on ED presentation, resolved  · Suspected to be in the setting of dehydration  · AM BMP recheck     Hyperkalemia  Assessment & Plan  Potassium 5 9 on ED presentation, resolved  · Suspected be in setting of ALEXY  · AM BMP recheck ordered     Abnormal CT scan  Assessment & Plan  · CT shows interval decrease in bilateral pleural effusions, however multiple small bilateral lung nodules are seen which are concerning for possible metastatic lung disease  · Patient reports having prior known lung nodules  · Patient does have some complaints of SOB, however is oxygenating appropriately on room air; continue to monitor oxygenation  · Outpatient f/u    Elevated alkaline phosphatase level  Assessment & Plan  Chronically elevated likely in setting of known hepatic metastasis  · RUQ US Decompressed gallbladder around a cholecystostomy tube, limiting evaluation  Hepatomegaly  Heterogeneous echotexture of the liver in this patient with known metastatic disease  7 mm nonobstructing right intrarenal calculus    · Monitor with am CMP    Acute on chronic kidney failure [facility])  Assessment & Plan  Lab Results   Component Value Date    EGFR 96 02/11/2023    EGFR 68 02/10/2023    EGFR 51 02/09/2023    CREATININE 0 84 02/11/2023    CREATININE 1 17 02/10/2023    CREATININE 1 47 (H) 02/09/2023     · Creatinine 2 5 to; baseline 1 0, improved to 0 84 today  · Suspecting possible multifactorial cause in setting of dehydration and sepsis  · Avoid nephrotoxic agents  · Continue with IV fluids  · AM BMP    Colon cancer metastasized to liver [facility])  Assessment & Plan  Follows w/ outpatient [facility] onc and palliative care  Extensive abdominal surgical history (11/7 ex lap w/ section 3 liver resection, and development of left upper quadrant hematoma and suspected leak from transverse colon anastomosis complication; 68/51 right hemicolectomy; 11/17 re-exploration w/partial descending colectomy; 12/8 IR percutaneous cholecystostomy tube and hepatectomy abscess drainage; 12/20 IR drains placed for perisplenic abscess)  · Patient does not wish to follow with [place] oncology anymore as it is too far of a drive; would like to establish with [facility] oncology (already follows with Dr Royce Sewell)  · Continue outpatient f/u    Benign essential hypertension  Assessment & Plan  BP stable  · Continue prehospital HTN medications  · Monitor BP per unit protocol    Type 2 diabetes mellitus without complication, with long-term current use of insulin (HCC)  Assessment & Plan    Lab Results   Component Value Date    HGBA1C 8 0 (H) 09/26/2022     ·   ·  home Lantus 8U qHS and humalog 4U TID w/meals   · Dose reduce to 5U qhs + 4 U TID   · 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  · Hypoglycemia protocol  · Diabetic diet     * Sepsis [facility])  Assessment & Plan  Patient with extensive abdominal surgical history, reports was recently discharged from rehab after prolonged hospitalization several weeks ago  Since then he has had increasing fatigue and generalized weakness, which has been particularly worse over the past several days  Over this timeframe he is also noted the development of foul-smelling drainage and increasing pain from his chronic abdominal wound  Otherwise he reports some mild SOB, and is without other symptoms/complaint at this time    · As evidenced by tachycardia, tachypnea, and leukocytosis  · In setting of purulent abdominal infection  · Procal 1 07  · Lactic intially 2 4; cleared after IVF in ED   · Hx of colon CA w/ extensive abd surgical hx as below   · Patient has several abdominal drains in place, with CT showing improvement in the fluid collections at the left liver lobe, perisplenic area, and the left retroperitoneum inferior to the spleen  · ED gave full 30cc/kg IVF bolus + IV zosyn  · Started on IV meropenem per ID, transitioned to IV ertapenem and IV vancomycin based on wound cultures- now growing ESBL and MRSA  · Trend WBC; follow-up with pending infectious labs and cultures  · ID consulted; recommendations appreciated   · S/p bedside midline wound debridement + wet dressing 2/8 with general surgery            VTE Pharmacologic Prophylaxis: VTE Score: 6 Moderate Risk (Score 3-4) - Pharmacological DVT Prophylaxis Ordered: heparin  Patient Centered Rounds: I performed bedside rounds with nursing staff today  Discussions with Specialists or Other Care Team Provider: JIMENA, ID    Education and Discussions with Family / Patient: Pt  Time Spent for Care: 60 minutes  More than 50% of total time spent on counseling and coordination of care as described above  Current Length of Stay: 4 day(s)  Current Patient Status: Inpatient   Certification Statement: The patient will continue to require additional inpatient hospital stay due to IV antibiotics  Discharge Plan: Anticipate discharge in 24-48 hrs to home with home services  Code Status: Level 1 - Full Code    Subjective:   Seen and examined at bedside  Upon exam he nervous about MRSA infection  Clark that he is on the appropriate medication labs improving  Patient verbalized understanding  Review of Systems   Constitutional: Positive for fatigue  Negative for activity change, appetite change and fever  HENT: Negative for congestion, postnasal drip, rhinorrhea and sinus pain  Eyes: Negative for photophobia, pain and discharge     Respiratory: Negative for apnea, cough, chest tightness, shortness of breath and wheezing  Cardiovascular: Negative for chest pain and leg swelling  Gastrointestinal: Negative for abdominal distention, abdominal pain, constipation, diarrhea, nausea and vomiting  Endocrine: Negative for polyuria  Genitourinary: Negative for decreased urine volume, difficulty urinating, dysuria, hematuria and urgency  Musculoskeletal: Negative for back pain, myalgias and neck pain  Skin: Negative for pallor, rash and wound  Neurological: Positive for weakness  Negative for dizziness, tremors, light-headedness and headaches  Hematological: Does not bruise/bleed easily  Psychiatric/Behavioral: Negative for agitation, behavioral problems and suicidal ideas  The patient is not nervous/anxious and is not hyperactive  Objective:     Vitals:   Temp (24hrs), Av 1 °F (36 7 °C), Min:97 5 °F (36 4 °C), Max:98 4 °F (36 9 °C)    Temp:  [97 5 °F (36 4 °C)-98 4 °F (36 9 °C)] 97 5 °F (36 4 °C)  HR:  [] 82  Resp:  [16-18] 18  BP: (131-140)/(82-89) 140/89  SpO2:  [94 %-100 %] 100 %  Body mass index is 27 98 kg/m²  Input and Output Summary (last 24 hours): Intake/Output Summary (Last 24 hours) at 2023 0835  Last data filed at 2023 0100  Gross per 24 hour   Intake 245 ml   Output 2216 ml   Net -1971 ml       Physical Exam:   Physical Exam  Vitals reviewed  Constitutional:       General: He is not in acute distress  Appearance: He is well-developed  He is not diaphoretic  HENT:      Head: Normocephalic and atraumatic  Mouth/Throat:      Pharynx: No oropharyngeal exudate  Eyes:      General: No scleral icterus  Extraocular Movements: Extraocular movements intact  Conjunctiva/sclera: Conjunctivae normal    Neck:      Vascular: No JVD  Trachea: No tracheal deviation  Cardiovascular:      Rate and Rhythm: Normal rate and regular rhythm  Heart sounds: No murmur heard  No friction rub  No gallop     Pulmonary:      Effort: Pulmonary effort is normal  No respiratory distress  Breath sounds: No stridor  No wheezing  Abdominal:      General: There is no distension  Palpations: There is no mass  Tenderness: There is no abdominal tenderness  There is no right CVA tenderness or left CVA tenderness  Comments: Midline wound noted  Drains in place   Musculoskeletal:         General: No tenderness  Normal range of motion  Right lower leg: No edema  Left lower leg: No edema  Skin:     General: Skin is warm and dry  Coloration: Skin is pale  Findings: No erythema  Neurological:      Mental Status: He is alert and oriented to person, place, and time  Psychiatric:         Behavior: Behavior normal          Thought Content: Thought content normal           Additional Data:     Labs:  Results from last 7 days   Lab Units 02/11/23  0553   WBC Thousand/uL 8 10   HEMOGLOBIN g/dL 7 4*   HEMATOCRIT % 23 9*   PLATELETS Thousands/uL 273   NEUTROS PCT % 77*   LYMPHS PCT % 10*   MONOS PCT % 10   EOS PCT % 0     Results from last 7 days   Lab Units 02/11/23  0512 02/10/23  0511 02/09/23  0635   SODIUM mmol/L 131*   < > 134*   POTASSIUM mmol/L 4 1   < > 4 4   CHLORIDE mmol/L 97   < > 103   CO2 mmol/L 22   < > 21   BUN mg/dL 12   < > 33*   CREATININE mg/dL 0 84   < > 1 47*   ANION GAP mmol/L 12   < > 10   CALCIUM mg/dL 7 1*   < > 8 4   ALBUMIN g/dL  --   --  1 3*   TOTAL BILIRUBIN mg/dL  --   --  0 68   ALK PHOS U/L  --   --  925*   ALT U/L  --   --  17   AST U/L  --   --  59*   GLUCOSE RANDOM mg/dL 119   < > 107    < > = values in this interval not displayed       Results from last 7 days   Lab Units 02/07/23  1530   INR  1 32*     Results from last 7 days   Lab Units 02/11/23  0713 02/10/23  2253 02/10/23  2040 02/10/23  1608 02/10/23  1149 02/10/23  0801 02/09/23 2011 02/09/23  1552 02/09/23  1108 02/09/23  0734 02/08/23  2114 02/08/23  1715   POC GLUCOSE mg/dl 123 146* 158* 127 158* 137 166* 118 192* 96 159* 162*         Results from last 7 days   Lab Units 02/11/23  0512 02/10/23  0955 02/08/23  0337 02/07/23  1759 02/07/23  1530   LACTIC ACID mmol/L  --  0 7  --  1 2 2 4*   PROCALCITONIN ng/ml 0 61*  --  1 37*  --  1 07*       Lines/Drains:  Invasive Devices     Central Venous Catheter Line  Duration           Port A Cath 03/10/22 Right Chest 337 days          Peripheral Intravenous Line  Duration           Peripheral IV 02/07/23 Left;Proximal;Ventral (anterior) Forearm 3 days          Drain  Duration           Ileostomy LUQ 85 days    Cholecystostomy Tube 60 days    Abscess Drain Abdomen 29 days                Central Line:  Goal for removal: N/A - Chronic PICC             Imaging: No pertinent imaging reviewed  Recent Cultures (last 7 days):   Results from last 7 days   Lab Units 02/07/23  1759 02/07/23  1530   BLOOD CULTURE   --  No Growth at 72 hrs  No Growth at 72 hrs     GRAM STAIN RESULT  3+ Gram positive rods*  1+ Gram positive cocci in chains*  Rare Disintegrating polys*  --    WOUND CULTURE  3+ Growth of Escherichia coli ESBL*  3+ Growth of Methicillin Resistant Staphylococcus aureus*  3+ Growth of  --        Last 24 Hours Medication List:   Current Facility-Administered Medications   Medication Dose Route Frequency Provider Last Rate   • acetaminophen  650 mg Oral Q4H PRN Georgina Ziegler PA-C     • amLODIPine  2 5 mg Oral Daily Georgina Ziegler PA-C     • aspirin  81 mg Oral Daily Georgene Curling [place], [place]     • atorvastatin  40 mg Oral Daily Georgene Curling [place], [place]     • collagenase   Topical Daily Annabelle Lopez PA-C     • DULoxetine  30 mg Oral Daily Georgene Curling [place], [place]     • ertapenem  1,000 mg Intravenous Q24H Laine Marrero MD 1,000 mg (02/10/23 1148)   • heparin (porcine)  5,000 Units Subcutaneous [facility] [place]gene [place]ling [place]     • HYDROmorphone  0 2 mg Intravenous Q4H PRN Nelia Ambrosio DO     • influenza vaccine  0 5 mL Intramuscular Prior to discharge Ramon Farrell MD     • insulin glargine  5 Units Subcutaneous HS Kiara Wang PA-C     • insulin lispro  1-6 Units Subcutaneous TID AC Mahad Matos [place]     • insulin lispro  1-6 Units Subcutaneous HS Mahad Matos [place]TE     • insulin lispro  4 Units Subcutaneous TID With Meals Low Benson PA-C     • melatonin  6 mg Oral HS PRN Robyn Cartwright PA-C     • methocarbamol  500 mg Oral BID PRN Low Benson PA-C     • ondansetron  4 mg Intravenous Q4H PRN Nelia Kenzie Saraiya, DO     • oxyCODONE  5 mg Oral Q4H PRN Nelia Kenzie Saraiya, DO     • oxyCODONE  2 5 mg Oral Q4H PRN Nelia Kenzie Saraiya, DO     • pantoprazole  40 mg Oral BID Low Benson PA-C     • pneumococcal 13-valent conjugate vaccine  0 5 mL Intramuscular Prior to discharge Anisha Lomeli MD     • simethicone  80 mg Oral 4x Daily (with meals and at bedtime) Low Benson PA-C     • sodium chloride (PF)  10 mL Intravenous Daily Nelia Kenzie Saraiya, DO     • tamsulosin  0 4 mg Oral Daily [facility] [address] [facility]     • vancomycin  750 mg Intravenous Q12H Bhavya Woodard  mg (02/10/23 1988)        Today, Patient Was Seen By: Paul Solorio DO    **Please Note: This note may have been constructed using a voice recognition system  **

## 2023-02-11 NOTE — PLAN OF CARE
Problem: PAIN - ADULT  Goal: Verbalizes/displays adequate comfort level or baseline comfort level  Description: Interventions:  - Encourage patient to monitor pain and request assistance  - Assess pain using appropriate pain scale  - Administer analgesics based on type and severity of pain and evaluate response  - Implement non-pharmacological measures as appropriate and evaluate response  - Consider cultural and social influences on pain and pain management  - Notify physician/advanced practitioner if interventions unsuccessful or patient reports new pain  Outcome: Progressing     Problem: INFECTION - ADULT  Goal: Absence or prevention of progression during hospitalization  Description: INTERVENTIONS:  - Assess and monitor for signs and symptoms of infection  - Monitor lab/diagnostic results  - Monitor all insertion sites, i e  indwelling lines, tubes, and drains  - Monitor endotracheal if appropriate and nasal secretions for changes in amount and color  - [place] appropriate cooling/warming therapies per order  - Administer medications as ordered  - Instruct and encourage patient and family to use good hand hygiene technique  - Identify and instruct in appropriate isolation precautions for identified infection/condition  Outcome: Progressing  Goal: Absence of fever/infection during neutropenic period  Description: INTERVENTIONS:  - Monitor WBC    Outcome: Progressing     Problem: SAFETY ADULT  Goal: Patient will remain free of falls  Description: INTERVENTIONS:  - Educate patient/family on patient safety including physical limitations  - Instruct patient to call for assistance with activity   - Consult OT/PT to assist with strengthening/mobility   - Keep Call bell within reach  - Keep bed low and locked with side rails adjusted as appropriate  - Keep care items and personal belongings within reach  - Initiate and maintain comfort rounds  - Make Fall Risk Sign visible to staff  - Offer Toileting every  Hours, in advance of need  - Initiate/Maintain alarm  - Obtain necessary fall risk management equipment:   - Apply yellow socks and bracelet for high fall risk patients  - Consider moving patient to room near nurses station  Outcome: Progressing  Goal: Maintain or return to baseline ADL function  Description: INTERVENTIONS:  -  Assess patient's ability to carry out ADLs; assess patient's baseline for ADL function and identify physical deficits which impact ability to perform ADLs (bathing, care of mouth/teeth, toileting, grooming, dressing, etc )  - Assess/evaluate cause of self-care deficits   - Assess range of motion  - Assess patient's mobility; develop plan if impaired  - Assess patient's need for assistive devices and provide as appropriate  - Encourage maximum independence but intervene and supervise when necessary  - Involve family in performance of ADLs  - Assess for home care needs following discharge   - Consider OT consult to assist with ADL evaluation and planning for discharge  - Provide patient education as appropriate  Outcome: Progressing  Goal: Maintains/Returns to pre admission functional level  Description: INTERVENTIONS:  - Perform BMAT or MOVE assessment daily    - Set and communicate daily mobility goal to care team and patient/family/caregiver  - Collaborate with rehabilitation services on mobility goals if consulted  - Perform Range of Motion  times a day  - Reposition patient every  hours    - Dangle patient  times a day  - Stand patient  times a day  - Ambulate patient  times a day  - Out of bed to chair  times a day   - Out of bed for meals  times a day  - Out of bed for toileting  - Record patient progress and toleration of activity level   Outcome: Progressing     Problem: DISCHARGE PLANNING  Goal: Discharge to home or other facility with appropriate resources  Description: INTERVENTIONS:  - Identify barriers to discharge w/patient and caregiver  - Arrange for needed discharge resources and transportation as appropriate  - Identify discharge learning needs (meds, wound care, etc )  - Arrange for interpretive services to assist at discharge as needed  - Refer to Case Management Department for coordinating discharge planning if the patient needs post-hospital services based on physician/advanced practitioner order or complex needs related to functional status, cognitive ability, or social support system  Outcome: Progressing     Problem: Knowledge Deficit  Goal: Patient/family/caregiver demonstrates understanding of disease process, treatment plan, medications, and discharge instructions  Description: Complete learning assessment and assess knowledge base    Interventions:  - Provide teaching at level of understanding  - Provide teaching via preferred learning methods  Outcome: Progressing     Problem: SKIN/TISSUE INTEGRITY - ADULT  Goal: Skin Integrity remains intact(Skin Breakdown Prevention)  Description: Assess:  -Perform Michael assessment every   -Clean and moisturize skin every   -Inspect skin when repositioning, toileting, and assisting with ADLS  -Assess under medical devices such as  every   -Assess extremities for adequate circulation and sensation     Bed Management:  -Have minimal linens on bed & keep smooth, unwrinkled  -Change linens as needed when moist or perspiring  -Avoid sitting or lying in one position for more than  hours while in bed  -Keep HOB at degrees     Toileting:  -Offer bedside commode  -Assess for incontinence every   -Use incontinent care products after each incontinent episode such as     Activity:  -Mobilize patient  times a day  -Encourage activity and walks on unit  -Encourage or provide ROM exercises   -Turn and reposition patient every  Hours  -Use appropriate equipment to lift or move patient in bed  -Instruct/ Assist with weight shifting every  when out of bed in chair  -Consider limitation of chair time  hour intervals    Skin Care:  -Avoid use of baby powder, tape, friction and shearing, hot water or constrictive clothing  -Relieve pressure over bony prominences using   -Do not massage red bony areas    Next Steps:  -Teach patient strategies to minimize risks such as    -Consider consults to  interdisciplinary teams such as   Outcome: Progressing  Goal: Incision(s), wounds(s) or drain site(s) healing without S/S of infection  Description: INTERVENTIONS  - Assess and document dressing, incision, wound bed, drain sites and surrounding tissue  - Provide patient and family education  - Perform skin care/dressing changes every   Outcome: Progressing  Goal: Pressure injury heals and does not worsen  Description: Interventions:  - Implement low air loss mattress or specialty surface (Criteria met)  - Apply silicone foam dressing  - Instruct/assist with weight shifting every  minutes when in chair   - Limit chair time to  hour intervals  - Use special pressure reducing interventions such as  when in chair   - Apply fecal or urinary incontinence containment device   - Perform passive or active ROM every   - Turn and reposition patient & offload bony prominences every hours   - Utilize friction reducing device or surface for transfers   - Consider consults to  interdisciplinary teams such as   - Use incontinent care products after each incontinent episode such as   - Consider nutrition services referral as needed  Outcome: Progressing     Problem: Prexisting or High Potential for Compromised Skin Integrity  Goal: Skin integrity is maintained or improved  Description: INTERVENTIONS:  - Identify patients at risk for skin breakdown  - Assess and monitor skin integrity  - Assess and monitor nutrition and hydration status  - Monitor labs   - Assess for incontinence   - Turn and reposition patient  - Assist with mobility/ambulation  - Relieve pressure over bony prominences  - Avoid friction and shearing  - Provide appropriate hygiene as needed including keeping skin clean and dry  - Evaluate need for skin moisturizer/barrier cream  - Collaborate with interdisciplinary team   - Patient/family teaching  - Consider wound care consult   Outcome: Progressing     Problem: MOBILITY - ADULT  Goal: Maintain or return to baseline ADL function  Description: INTERVENTIONS:  -  Assess patient's ability to carry out ADLs; assess patient's baseline for ADL function and identify physical deficits which impact ability to perform ADLs (bathing, care of mouth/teeth, toileting, grooming, dressing, etc )  - Assess/evaluate cause of self-care deficits   - Assess range of motion  - Assess patient's mobility; develop plan if impaired  - Assess patient's need for assistive devices and provide as appropriate  - Encourage maximum independence but intervene and supervise when necessary  - Involve family in performance of ADLs  - Assess for home care needs following discharge   - Consider OT consult to assist with ADL evaluation and planning for discharge  - Provide patient education as appropriate  Outcome: Progressing  Goal: Maintains/Returns to pre admission functional level  Description: INTERVENTIONS:  - Perform BMAT or MOVE assessment daily    - Set and communicate daily mobility goal to care team and patient/family/caregiver  - Collaborate with rehabilitation services on mobility goals if consulted  - Perform Range of Motion  times a day  - Reposition patient every  hours    - Dangle patient  times a day  - Stand patient  times a day  - Ambulate patient  times a day  - Out of bed to chair  times a day   - Out of bed for meals  times a day  - Out of bed for toileting  - Record patient progress and toleration of activity level   Outcome: Progressing

## 2023-02-11 NOTE — PROGRESS NOTES
Rd Quintanilla is a 61 y o  male who is currently ordered Vancomycin IV with management by the Pharmacy Consult service  Relevant clinical data and objective / subjective history reviewed  Vancomycin Assessment:  Indication and Goal AUC/Trough: Other, Intra-abdominal sepsis with possible wound infection, -600, trough >10  Clinical Status: stable  Micro:     Renal Function:  SCr: 0 84 mg/dL  CrCl: 107 4 mL/min  Renal replacement: Not on dialysis  Days of Therapy: 2  Current Dose: 750mg q12h  Vancomycin Plan:  New Dosinmg IV q12h  Estimated AUC: 445 mcg*hr/mL  Estimated Trough: 13 8 mcg/mL  Next Level: 23 with AM labs  Renal Function Monitoring: Daily BMP and [place] will continue to follow closely for s/sx of nephrotoxicity, infusion reactions and appropriateness of therapy  BMP and CBC will be ordered per protocol  We will continue to follow the patient’s culture results and clinical progress daily      Gerardo Solo, Pharmacist

## 2023-02-12 LAB
ALBUMIN SERPL BCP-MCNC: 1.2 G/DL (ref 3.5–5)
ALP SERPL-CCNC: 843 U/L (ref 46–116)
ALT SERPL W P-5'-P-CCNC: 14 U/L (ref 12–78)
ANION GAP SERPL CALCULATED.3IONS-SCNC: 8 MMOL/L (ref 4–13)
AST SERPL W P-5'-P-CCNC: 49 U/L (ref 5–45)
BACTERIA BLD CULT: NORMAL
BACTERIA BLD CULT: NORMAL
BILIRUB DIRECT SERPL-MCNC: 0.35 MG/DL (ref 0–0.2)
BILIRUB SERPL-MCNC: 0.53 MG/DL (ref 0.2–1)
BUN SERPL-MCNC: 15 MG/DL (ref 5–25)
CALCIUM SERPL-MCNC: 7.9 MG/DL (ref 8.3–10.1)
CHLORIDE SERPL-SCNC: 97 MMOL/L (ref 96–108)
CO2 SERPL-SCNC: 25 MMOL/L (ref 21–32)
CREAT SERPL-MCNC: 1.08 MG/DL (ref 0.6–1.3)
ERYTHROCYTE [DISTWIDTH] IN BLOOD BY AUTOMATED COUNT: 17.4 % (ref 11.6–15.1)
GFR SERPL CREATININE-BSD FRML MDRD: 75 ML/MIN/1.73SQ M
GLUCOSE SERPL-MCNC: 125 MG/DL (ref 65–140)
GLUCOSE SERPL-MCNC: 212 MG/DL (ref 65–140)
GLUCOSE SERPL-MCNC: 303 MG/DL (ref 65–140)
GLUCOSE SERPL-MCNC: 96 MG/DL (ref 65–140)
HCT VFR BLD AUTO: 23.8 % (ref 36.5–49.3)
HGB BLD-MCNC: 7.2 G/DL (ref 12–17)
MCH RBC QN AUTO: 25.2 PG (ref 26.8–34.3)
MCHC RBC AUTO-ENTMCNC: 30.3 G/DL (ref 31.4–37.4)
MCV RBC AUTO: 83 FL (ref 82–98)
PLATELET # BLD AUTO: 304 THOUSANDS/UL (ref 149–390)
PMV BLD AUTO: 9.5 FL (ref 8.9–12.7)
POTASSIUM SERPL-SCNC: 3.8 MMOL/L (ref 3.5–5.3)
PROT SERPL-MCNC: 6.8 G/DL (ref 6.4–8.4)
RBC # BLD AUTO: 2.86 MILLION/UL (ref 3.88–5.62)
SODIUM SERPL-SCNC: 130 MMOL/L (ref 135–147)
VANCOMYCIN SERPL-MCNC: 22.3 UG/ML (ref 10–20)
WBC # BLD AUTO: 9.12 THOUSAND/UL (ref 4.31–10.16)

## 2023-02-12 RX ADMIN — AMLODIPINE BESYLATE 2.5 MG: 2.5 TABLET ORAL at 10:00

## 2023-02-12 RX ADMIN — HEPARIN SODIUM 5000 UNITS: 5000 INJECTION INTRAVENOUS; SUBCUTANEOUS at 22:17

## 2023-02-12 RX ADMIN — DULOXETINE HYDROCHLORIDE 30 MG: 30 CAPSULE, DELAYED RELEASE ORAL at 10:00

## 2023-02-12 RX ADMIN — HYDROMORPHONE HYDROCHLORIDE 0.2 MG: 0.2 INJECTION, SOLUTION INTRAMUSCULAR; INTRAVENOUS; SUBCUTANEOUS at 23:36

## 2023-02-12 RX ADMIN — INSULIN LISPRO 4 UNITS: 100 INJECTION, SOLUTION INTRAVENOUS; SUBCUTANEOUS at 16:38

## 2023-02-12 RX ADMIN — VANCOMYCIN HYDROCHLORIDE 1000 MG: 1 INJECTION, SOLUTION INTRAVENOUS at 11:00

## 2023-02-12 RX ADMIN — HEPARIN SODIUM 5000 UNITS: 5000 INJECTION INTRAVENOUS; SUBCUTANEOUS at 14:54

## 2023-02-12 RX ADMIN — INSULIN GLARGINE 5 UNITS: 100 INJECTION, SOLUTION SUBCUTANEOUS at 22:19

## 2023-02-12 RX ADMIN — SIMETHICONE 80 MG: 80 TABLET, CHEWABLE ORAL at 10:00

## 2023-02-12 RX ADMIN — SIMETHICONE 80 MG: 80 TABLET, CHEWABLE ORAL at 12:54

## 2023-02-12 RX ADMIN — ATORVASTATIN CALCIUM 40 MG: 40 TABLET, FILM COATED ORAL at 10:00

## 2023-02-12 RX ADMIN — PANTOPRAZOLE SODIUM 40 MG: 40 TABLET, DELAYED RELEASE ORAL at 18:35

## 2023-02-12 RX ADMIN — INSULIN LISPRO 4 UNITS: 100 INJECTION, SOLUTION INTRAVENOUS; SUBCUTANEOUS at 08:15

## 2023-02-12 RX ADMIN — SIMETHICONE 80 MG: 80 TABLET, CHEWABLE ORAL at 16:39

## 2023-02-12 RX ADMIN — SODIUM CHLORIDE, PRESERVATIVE FREE 10 ML: 5 INJECTION INTRAVENOUS at 10:00

## 2023-02-12 RX ADMIN — INSULIN LISPRO 5 UNITS: 100 INJECTION, SOLUTION INTRAVENOUS; SUBCUTANEOUS at 11:33

## 2023-02-12 RX ADMIN — ASPIRIN 81 MG 81 MG: 81 TABLET ORAL at 10:00

## 2023-02-12 RX ADMIN — INSULIN LISPRO 4 UNITS: 100 INJECTION, SOLUTION INTRAVENOUS; SUBCUTANEOUS at 11:32

## 2023-02-12 RX ADMIN — VANCOMYCIN HYDROCHLORIDE 1250 MG: 5 INJECTION, POWDER, LYOPHILIZED, FOR SOLUTION INTRAVENOUS at 22:25

## 2023-02-12 RX ADMIN — SIMETHICONE 80 MG: 80 TABLET, CHEWABLE ORAL at 22:17

## 2023-02-12 RX ADMIN — PANTOPRAZOLE SODIUM 40 MG: 40 TABLET, DELAYED RELEASE ORAL at 10:00

## 2023-02-12 RX ADMIN — ERTAPENEM SODIUM 1000 MG: 1 INJECTION, POWDER, LYOPHILIZED, FOR SOLUTION INTRAMUSCULAR; INTRAVENOUS at 10:29

## 2023-02-12 RX ADMIN — COLLAGENASE SANTYL: 250 OINTMENT TOPICAL at 10:00

## 2023-02-12 RX ADMIN — OXYCODONE HYDROCHLORIDE 5 MG: 5 TABLET ORAL at 22:24

## 2023-02-12 RX ADMIN — TAMSULOSIN HYDROCHLORIDE 0.4 MG: 0.4 CAPSULE ORAL at 18:36

## 2023-02-12 NOTE — PLAN OF CARE
Problem: PAIN - ADULT  Goal: Verbalizes/displays adequate comfort level or baseline comfort level  Description: Interventions:  - Encourage patient to monitor pain and request assistance  - Assess pain using appropriate pain scale  - Administer analgesics based on type and severity of pain and evaluate response  - Implement non-pharmacological measures as appropriate and evaluate response  - Consider cultural and social influences on pain and pain management  - Notify physician/advanced practitioner if interventions unsuccessful or patient reports new pain  Outcome: Progressing     Problem: INFECTION - ADULT  Goal: Absence or prevention of progression during hospitalization  Description: INTERVENTIONS:  - Assess and monitor for signs and symptoms of infection  - Monitor lab/diagnostic results  - Monitor all insertion sites, i e  indwelling lines, tubes, and drains  - Monitor endotracheal if appropriate and nasal secretions for changes in amount and color  - [place] appropriate cooling/warming therapies per order  - Administer medications as ordered  - Instruct and encourage patient and family to use good hand hygiene technique  - Identify and instruct in appropriate isolation precautions for identified infection/condition  Outcome: Progressing  Goal: Absence of fever/infection during neutropenic period  Description: INTERVENTIONS:  - Monitor WBC    Outcome: Progressing     Problem: SAFETY ADULT  Goal: Patient will remain free of falls  Description: INTERVENTIONS:  - Educate patient/family on patient safety including physical limitations  - Instruct patient to call for assistance with activity   - Consult OT/PT to assist with strengthening/mobility   - Keep Call bell within reach  - Keep bed low and locked with side rails adjusted as appropriate  - Keep care items and personal belongings within reach  - Initiate and maintain comfort rounds  - Make Fall Risk Sign visible to staff  - Offer Toileting every  Hours, in advance of need  - Initiate/Maintain alarm  - Obtain necessary fall risk management equipment:   - Apply yellow socks and bracelet for high fall risk patients  - Consider moving patient to room near nurses station  Outcome: Progressing  Goal: Maintain or return to baseline ADL function  Description: INTERVENTIONS:  -  Assess patient's ability to carry out ADLs; assess patient's baseline for ADL function and identify physical deficits which impact ability to perform ADLs (bathing, care of mouth/teeth, toileting, grooming, dressing, etc )  - Assess/evaluate cause of self-care deficits   - Assess range of motion  - Assess patient's mobility; develop plan if impaired  - Assess patient's need for assistive devices and provide as appropriate  - Encourage maximum independence but intervene and supervise when necessary  - Involve family in performance of ADLs  - Assess for home care needs following discharge   - Consider OT consult to assist with ADL evaluation and planning for discharge  - Provide patient education as appropriate  Outcome: Progressing  Goal: Maintains/Returns to pre admission functional level  Description: INTERVENTIONS:  - Perform BMAT or MOVE assessment daily    - Set and communicate daily mobility goal to care team and patient/family/caregiver  - Collaborate with rehabilitation services on mobility goals if consulted  - Perform Range of Motion  times a day  - Reposition patient every  hours    - Dangle patient  times a day  - Stand patient  times a day  - Ambulate patient  times a day  - Out of bed to chair  times a day   - Out of bed for meals  times a day  - Out of bed for toileting  - Record patient progress and toleration of activity level   Outcome: Progressing     Problem: DISCHARGE PLANNING  Goal: Discharge to home or other facility with appropriate resources  Description: INTERVENTIONS:  - Identify barriers to discharge w/patient and caregiver  - Arrange for needed discharge resources and transportation as appropriate  - Identify discharge learning needs (meds, wound care, etc )  - Arrange for interpretive services to assist at discharge as needed  - Refer to Case Management Department for coordinating discharge planning if the patient needs post-hospital services based on physician/advanced practitioner order or complex needs related to functional status, cognitive ability, or social support system  Outcome: Progressing     Problem: Knowledge Deficit  Goal: Patient/family/caregiver demonstrates understanding of disease process, treatment plan, medications, and discharge instructions  Description: Complete learning assessment and assess knowledge base    Interventions:  - Provide teaching at level of understanding  - Provide teaching via preferred learning methods  Outcome: Progressing     Problem: SKIN/TISSUE INTEGRITY - ADULT  Goal: Skin Integrity remains intact(Skin Breakdown Prevention)  Description: Assess:  -Perform Michael assessment every   -Clean and moisturize skin every   -Inspect skin when repositioning, toileting, and assisting with ADLS  -Assess under medical devices such as every   -Assess extremities for adequate circulation and sensation     Bed Management:  -Have minimal linens on bed & keep smooth, unwrinkled  -Change linens as needed when moist or perspiring  -Avoid sitting or lying in one position for more than  hours while in bed  -Keep HOB at degrees     Toileting:  -Offer bedside commode  -Assess for incontinence every   -Use incontinent care products after each incontinent episode such as     Activity:  -Mobilize patient  times a day  -Encourage activity and walks on unit  -Encourage or provide ROM exercises   -Turn and reposition patient every  Hours  -Use appropriate equipment to lift or move patient in bed  -Instruct/ Assist with weight shifting every  when out of bed in chair  -Consider limitation of chair time  hour intervals    Skin Care:  -Avoid use of baby powder, tape, friction and shearing, hot water or constrictive clothing  -Relieve pressure over bony prominences using   -Do not massage red bony areas    Next Steps:  -Teach patient strategies to minimize risks such as    -Consider consults to  interdisciplinary teams such as   Outcome: Progressing  Goal: Incision(s), wounds(s) or drain site(s) healing without S/S of infection  Description: INTERVENTIONS  - Assess and document dressing, incision, wound bed, drain sites and surrounding tissue  - Provide patient and family education  - Perform skin care/dressing changes every  Outcome: Progressing  Goal: Pressure injury heals and does not worsen  Description: Interventions:  - Implement low air loss mattress or specialty surface (Criteria met)  - Apply silicone foam dressing  - Instruct/assist with weight shifting every  minutes when in chair   - Limit chair time to  hour intervals  - Use special pressure reducing interventions such as  when in chair   - Apply fecal or urinary incontinence containment device   - Perform passive or active ROM every   - Turn and reposition patient & offload bony prominences every  hours   - Utilize friction reducing device or surface for transfers   - Consider consults to  interdisciplinary teams such as   - Use incontinent care products after each incontinent episode such as   - Consider nutrition services referral as needed  Outcome: Progressing     Problem: Prexisting or High Potential for Compromised Skin Integrity  Goal: Skin integrity is maintained or improved  Description: INTERVENTIONS:  - Identify patients at risk for skin breakdown  - Assess and monitor skin integrity  - Assess and monitor nutrition and hydration status  - Monitor labs   - Assess for incontinence   - Turn and reposition patient  - Assist with mobility/ambulation  - Relieve pressure over bony prominences  - Avoid friction and shearing  - Provide appropriate hygiene as needed including keeping skin clean and dry  - Evaluate need for skin moisturizer/barrier cream  - Collaborate with interdisciplinary team   - Patient/family teaching  - Consider wound care consult   Outcome: Progressing     Problem: MOBILITY - ADULT  Goal: Maintain or return to baseline ADL function  Description: INTERVENTIONS:  -  Assess patient's ability to carry out ADLs; assess patient's baseline for ADL function and identify physical deficits which impact ability to perform ADLs (bathing, care of mouth/teeth, toileting, grooming, dressing, etc )  - Assess/evaluate cause of self-care deficits   - Assess range of motion  - Assess patient's mobility; develop plan if impaired  - Assess patient's need for assistive devices and provide as appropriate  - Encourage maximum independence but intervene and supervise when necessary  - Involve family in performance of ADLs  - Assess for home care needs following discharge   - Consider OT consult to assist with ADL evaluation and planning for discharge  - Provide patient education as appropriate  Outcome: Progressing  Goal: Maintains/Returns to pre admission functional level  Description: INTERVENTIONS:  - Perform BMAT or MOVE assessment daily    - Set and communicate daily mobility goal to care team and patient/family/caregiver  - Collaborate with rehabilitation services on mobility goals if consulted  - Perform Range of Motion times a day  - Reposition patient every  hours    - Dangle patient  times a day  - Stand patient  times a day  - Ambulate patient  times a day  - Out of bed to chair  times a day   - Out of bed for meals  times a day  - Out of bed for toileting  - Record patient progress and toleration of activity level   Outcome: Progressing

## 2023-02-12 NOTE — PROGRESS NOTES
Progress Note - Infectious Disease   Catracho Ledesma 61 y o  male MRN: [id number]  Unit/Bed#: -01 Encounter: [id number]      Impression/Plan:  1   Systemic inflammatory response syndrome   Leukocytosis and tachycardia   Present on admission   Possible sepsis without a well-defined source   The patient CT chest abdomen and pelvis does not reveal any new source of sepsis   Urinalysis nondiagnostic for UTI   The abdominal wound has been debrided   Unclear significance of the extremely high alkaline phosphatase level with the total bilirubin normal and the remainder of the transaminases not proportionally high   Consideration for the possibility of bacteremia   Right upper quadrant ultrasound without biliary ductal dilatation   Wound culture with ESBL and MRSA  No recurrence of the fever and the white cell count has come down substantially  -Continue vancomycin and ertapenem for now  -Pharmacy follow-up for vancomycin dose management  -Surgery follow-up  -Recheck CBC with differential, CMP, procalcitonin level  -Source control measures as below  -Supportive care     2   Intra-abdominal abscesses   Status post IR drainage with drains remaining in place but with improved findings on repeat CT scan   Patient completed several weeks of intravenous antibiotics   No evidence of a new abscess   -Antibiotics as above for now  -Surgery follow-up  -Serial exams  -Reconsult interventional radiology for possible biliary drain revision     3   ESBL E  coli bacteremia/abscess formation   Status post prolonged course of intravenous antibiotics with clearance of the bacteremia   Abscess is improved as above  -Antibiotics as above for now  -Follow-up blood cultures     4   Abdominal wound   No overt cellulitis or purulence seen although there is some exudate   Possible mild wound infection that is status post debridement by surgery   ESBL and possible MRSA    -Local wound care  -Serial abdominal exams  -Surgery follow-up  -Antibiotics as above     5   Metastatic colon cancer   Status post extensive surgical resection with reexploration in the setting of complication     6   Acute kidney injury   Suspect secondary to prerenal issues   No other clear source appreciated   Renal function improving with IV fluid resuscitation   -Recheck BMP  -Volume management  -Dose adjust the antibiotics as needed    Discussed the above antibiotic management plan in detail with the primary service who agree with the plan    Antibiotics:  Ertapenem 3  Vancomycin 3  Antibiotics 6    Subjective:  Patient has no fever, chills, sweats; no nausea, vomiting, diarrhea; no cough, shortness of breath; no increased pain  No new symptoms  Objective:  Vitals:  Temp:  [97 5 °F (36 4 °C)-98 5 °F (36 9 °C)] 97 5 °F (36 4 °C)  HR:  [] 86  Resp:  [20] 20  BP: (115-118)/(76-79) 117/76  SpO2:  [96 %-98 %] 98 %  Temp (24hrs), Av 1 °F (36 7 °C), Min:97 5 °F (36 4 °C), Max:98 5 °F (36 9 °C)  Current: Temperature: 97 5 °F (36 4 °C)    Physical Exam:   General Appearance:  Alert, interactive, nontoxic, no acute distress  Throat: Oropharynx moist without lesions  Lungs:   Clear to auscultation bilaterally; no wheezes, rhonchi or rales; respirations unlabored   Heart:  RRR; no murmur, rub or gallop   Abdomen:   Soft, non-tender, non-distended, positive bowel sounds  Right upper quadrant biliary drain in place  VICTORINO drain in place  Ostomy with good output  Midline abdominal wound dressed  Extremities: No clubbing, cyanosis or edema   Skin: No new rashes or lesions  No draining wounds noted         Labs, Imaging, & Other studies:   All pertinent labs and imaging studies were personally reviewed  Results from last 7 days   Lab Units 23  0459 23  0553 02/10/23  0511   WBC Thousand/uL 9 12 8 10 14 81*   HEMOGLOBIN g/dL 7 2* 7 4* 8 1*   PLATELETS Thousands/uL 304 273 375     Results from last 7 days   Lab Units 23  0512 02/10/23  6568 02/09/23  6139 02/08/23  0337 02/07/23 2014 02/07/23  1530   SODIUM mmol/L 131* 132* 134* 134*   < > 129*   POTASSIUM mmol/L 4 1 4 3 4 4 4 8   < > 5 9*   CHLORIDE mmol/L 97 101 103 102   < > 98   CO2 mmol/L 22 21 21 21   < > 15*   BUN mg/dL 12 24 33* 53*   < > 69*   CREATININE mg/dL 0 84 1 17 1 47* 1 93*   < > 2 52*   EGFR ml/min/1 73sq m 96 68 51 37   < > 27   CALCIUM mg/dL 7 1* 8 4 8 4 8 6   < > 9 4   AST U/L  --   --  59* 61*  --  62*   ALT U/L  --   --  17 20  --  26   ALK PHOS U/L  --   --  925* 1,043*  --  1,300*    < > = values in this interval not displayed  Results from last 7 days   Lab Units 02/07/23  1759 02/07/23  1530   BLOOD CULTURE   --  No Growth After 4 Days  No Growth After 4 Days     GRAM STAIN RESULT  3+ Gram positive rods*  1+ Gram positive cocci in chains*  Rare Disintegrating polys*  --    WOUND CULTURE  3+ Growth of Escherichia coli ESBL*  3+ Growth of Methicillin Resistant Staphylococcus aureus*  3+ Growth of  --      Results from last 7 days   Lab Units 02/11/23  0512 02/08/23  0337 02/07/23  1530   PROCALCITONIN ng/ml 0 61* 1 37* 1 07*

## 2023-02-12 NOTE — ASSESSMENT & PLAN NOTE
· Sodium 129 on ED presentation, improving  · Suspected to be in the setting of dehydration  · AM BMP recheck

## 2023-02-12 NOTE — PLAN OF CARE
Problem: PAIN - ADULT  Goal: Verbalizes/displays adequate comfort level or baseline comfort level  Description: Interventions:  - Encourage patient to monitor pain and request assistance  - Assess pain using appropriate pain scale  - Administer analgesics based on type and severity of pain and evaluate response  - Implement non-pharmacological measures as appropriate and evaluate response  - Consider cultural and social influences on pain and pain management  - Notify physician/advanced practitioner if interventions unsuccessful or patient reports new pain  Outcome: Progressing     Problem: INFECTION - ADULT  Goal: Absence or prevention of progression during hospitalization  Description: INTERVENTIONS:  - Assess and monitor for signs and symptoms of infection  - Monitor lab/diagnostic results  - Monitor all insertion sites, i e  indwelling lines, tubes, and drains  - Monitor endotracheal if appropriate and nasal secretions for changes in amount and color  - [place] appropriate cooling/warming therapies per order  - Administer medications as ordered  - Instruct and encourage patient and family to use good hand hygiene technique  - Identify and instruct in appropriate isolation precautions for identified infection/condition  Outcome: Progressing  Goal: Absence of fever/infection during neutropenic period  Description: INTERVENTIONS:  - Monitor WBC    Outcome: Progressing     Problem: SAFETY ADULT  Goal: Patient will remain free of falls  Description: INTERVENTIONS:  - Educate patient/family on patient safety including physical limitations  - Instruct patient to call for assistance with activity   - Consult OT/PT to assist with strengthening/mobility   - Keep Call bell within reach  - Keep bed low and locked with side rails adjusted as appropriate  - Keep care items and personal belongings within reach  - Initiate and maintain comfort rounds  - Make Fall Risk Sign visible to staff  - Offer Toileting every  Hours, in advance of need  - Initiate/Maintain alarm  - Obtain necessary fall risk management equipment:   - Apply yellow socks and bracelet for high fall risk patients  - Consider moving patient to room near nurses station  Outcome: Progressing  Goal: Maintain or return to baseline ADL function  Description: INTERVENTIONS:  -  Assess patient's ability to carry out ADLs; assess patient's baseline for ADL function and identify physical deficits which impact ability to perform ADLs (bathing, care of mouth/teeth, toileting, grooming, dressing, etc )  - Assess/evaluate cause of self-care deficits   - Assess range of motion  - Assess patient's mobility; develop plan if impaired  - Assess patient's need for assistive devices and provide as appropriate  - Encourage maximum independence but intervene and supervise when necessary  - Involve family in performance of ADLs  - Assess for home care needs following discharge   - Consider OT consult to assist with ADL evaluation and planning for discharge  - Provide patient education as appropriate  Outcome: Progressing  Goal: Maintains/Returns to pre admission functional level  Description: INTERVENTIONS:  - Perform BMAT or MOVE assessment daily    - Set and communicate daily mobility goal to care team and patient/family/caregiver  - Collaborate with rehabilitation services on mobility goals if consulted  - Perform Range of Motion  times a day  - Reposition patient every  hours    - Dangle patient  times a day  - Stand patient  times a day  - Ambulate patient  times a day  - Out of bed to chair  times a day   - Out of bed for meals  times a day  - Out of bed for toileting  - Record patient progress and toleration of activity level   Outcome: Progressing     Problem: DISCHARGE PLANNING  Goal: Discharge to home or other facility with appropriate resources  Description: INTERVENTIONS:  - Identify barriers to discharge w/patient and caregiver  - Arrange for needed discharge resources and transportation as appropriate  - Identify discharge learning needs (meds, wound care, etc )  - Arrange for interpretive services to assist at discharge as needed  - Refer to Case Management Department for coordinating discharge planning if the patient needs post-hospital services based on physician/advanced practitioner order or complex needs related to functional status, cognitive ability, or social support system  Outcome: Progressing     Problem: Knowledge Deficit  Goal: Patient/family/caregiver demonstrates understanding of disease process, treatment plan, medications, and discharge instructions  Description: Complete learning assessment and assess knowledge base    Interventions:  - Provide teaching at level of understanding  - Provide teaching via preferred learning methods  Outcome: Progressing     Problem: SKIN/TISSUE INTEGRITY - ADULT  Goal: Skin Integrity remains intact(Skin Breakdown Prevention)  Description: Assess:  -Perform Michael assessment every   -Clean and moisturize skin every   -Inspect skin when repositioning, toileting, and assisting with ADLS  -Assess under medical devices such as  every   -Assess extremities for adequate circulation and sensation     Bed Management:  -Have minimal linens on bed & keep smooth, unwrinkled  -Change linens as needed when moist or perspiring  -Avoid sitting or lying in one position for more than  hours while in bed  -Keep HOB at degrees     Toileting:  -Offer bedside commode  -Assess for incontinence every   -Use incontinent care products after each incontinent episode such as     Activity:  -Mobilize patient  times a day  -Encourage activity and walks on unit  -Encourage or provide ROM exercises   -Turn and reposition patient every  Hours  -Use appropriate equipment to lift or move patient in bed  -Instruct/ Assist with weight shifting every  when out of bed in chair  -Consider limitation of chair time  hour intervals    Skin Care:  -Avoid use of baby powder, tape, friction and shearing, hot water or constrictive clothing  -Relieve pressure over bony prominences using   -Do not massage red bony areas    Next Steps:  -Teach patient strategies to minimize risks such as    -Consider consults to  interdisciplinary teams such as   Outcome: Progressing  Goal: Incision(s), wounds(s) or drain site(s) healing without S/S of infection  Description: INTERVENTIONS  - Assess and document dressing, incision, wound bed, drain sites and surrounding tissue  - Provide patient and family education  - Perform skin care/dressing changes every   Outcome: Progressing  Goal: Pressure injury heals and does not worsen  Description: Interventions:  - Implement low air loss mattress or specialty surface (Criteria met)  - Apply silicone foam dressing  - Instruct/assist with weight shifting every  minutes when in chair   - Limit chair time to  hour intervals  - Use special pressure reducing interventions such as  when in chair   - Apply fecal or urinary incontinence containment device   - Perform passive or active ROM every   - Turn and reposition patient & offload bony prominences every  hours   - Utilize friction reducing device or surface for transfers   - Consider consults to  interdisciplinary teams such as   - Use incontinent care products after each incontinent episode such as   - Consider nutrition services referral as needed  Outcome: Progressing     Problem: Prexisting or High Potential for Compromised Skin Integrity  Goal: Skin integrity is maintained or improved  Description: INTERVENTIONS:  - Identify patients at risk for skin breakdown  - Assess and monitor skin integrity  - Assess and monitor nutrition and hydration status  - Monitor labs   - Assess for incontinence   - Turn and reposition patient  - Assist with mobility/ambulation  - Relieve pressure over bony prominences  - Avoid friction and shearing  - Provide appropriate hygiene as needed including keeping skin clean and dry  - Evaluate need for skin moisturizer/barrier cream  - Collaborate with interdisciplinary team   - Patient/family teaching  - Consider wound care consult   Outcome: Progressing     Problem: MOBILITY - ADULT  Goal: Maintain or return to baseline ADL function  Description: INTERVENTIONS:  -  Assess patient's ability to carry out ADLs; assess patient's baseline for ADL function and identify physical deficits which impact ability to perform ADLs (bathing, care of mouth/teeth, toileting, grooming, dressing, etc )  - Assess/evaluate cause of self-care deficits   - Assess range of motion  - Assess patient's mobility; develop plan if impaired  - Assess patient's need for assistive devices and provide as appropriate  - Encourage maximum independence but intervene and supervise when necessary  - Involve family in performance of ADLs  - Assess for home care needs following discharge   - Consider OT consult to assist with ADL evaluation and planning for discharge  - Provide patient education as appropriate  Outcome: Progressing  Goal: Maintains/Returns to pre admission functional level  Description: INTERVENTIONS:  - Perform BMAT or MOVE assessment daily    - Set and communicate daily mobility goal to care team and patient/family/caregiver  - Collaborate with rehabilitation services on mobility goals if consulted  - Perform Range of Motion  times a day  - Reposition patient every  hours    - Dangle patient  times a day  - Stand patient  times a day  - Ambulate patient  times a day  - Out of bed to chair  times a day   - Out of bed for meals  times a day  - Out of bed for toileting  - Record patient progress and toleration of activity level   Outcome: Progressing     Problem: PAIN - ADULT  Goal: Verbalizes/displays adequate comfort level or baseline comfort level  Description: Interventions:  - Encourage patient to monitor pain and request assistance  - Assess pain using appropriate pain scale  - Administer analgesics based on type and severity of pain and evaluate response  - Implement non-pharmacological measures as appropriate and evaluate response  - Consider cultural and social influences on pain and pain management  - Notify physician/advanced practitioner if interventions unsuccessful or patient reports new pain  Outcome: Progressing     Problem: INFECTION - ADULT  Goal: Absence or prevention of progression during hospitalization  Description: INTERVENTIONS:  - Assess and monitor for signs and symptoms of infection  - Monitor lab/diagnostic results  - Monitor all insertion sites, i e  indwelling lines, tubes, and drains  - Monitor endotracheal if appropriate and nasal secretions for changes in amount and color  - [place] appropriate cooling/warming therapies per order  - Administer medications as ordered  - Instruct and encourage patient and family to use good hand hygiene technique  - Identify and instruct in appropriate isolation precautions for identified infection/condition  Outcome: Progressing  Goal: Absence of fever/infection during neutropenic period  Description: INTERVENTIONS:  - Monitor WBC    Outcome: Progressing     Problem: SAFETY ADULT  Goal: Patient will remain free of falls  Description: INTERVENTIONS:  - Educate patient/family on patient safety including physical limitations  - Instruct patient to call for assistance with activity   - Consult OT/PT to assist with strengthening/mobility   - Keep Call bell within reach  - Keep bed low and locked with side rails adjusted as appropriate  - Keep care items and personal belongings within reach  - Initiate and maintain comfort rounds  - Make Fall Risk Sign visible to staff  - Offer Toileting every  Hours, in advance of need  - Initiate/Maintain alarm  - Obtain necessary fall risk management equipment:   - Apply yellow socks and bracelet for high fall risk patients  - Consider moving patient to room near nurses station  Outcome: Progressing  Goal: Maintain or return to baseline ADL function  Description: INTERVENTIONS:  -  Assess patient's ability to carry out ADLs; assess patient's baseline for ADL function and identify physical deficits which impact ability to perform ADLs (bathing, care of mouth/teeth, toileting, grooming, dressing, etc )  - Assess/evaluate cause of self-care deficits   - Assess range of motion  - Assess patient's mobility; develop plan if impaired  - Assess patient's need for assistive devices and provide as appropriate  - Encourage maximum independence but intervene and supervise when necessary  - Involve family in performance of ADLs  - Assess for home care needs following discharge   - Consider OT consult to assist with ADL evaluation and planning for discharge  - Provide patient education as appropriate  Outcome: Progressing  Goal: Maintains/Returns to pre admission functional level  Description: INTERVENTIONS:  - Perform BMAT or MOVE assessment daily    - Set and communicate daily mobility goal to care team and patient/family/caregiver  - Collaborate with rehabilitation services on mobility goals if consulted  - Perform Range of Motion  times a day  - Reposition patient every  hours    - Dangle patient  times a day  - Stand patient  times a day  - Ambulate patient  times a day  - Out of bed to chair  times a day   - Out of bed for meals  times a day  - Out of bed for toileting  - Record patient progress and toleration of activity level   Outcome: Progressing     Problem: DISCHARGE PLANNING  Goal: Discharge to home or other facility with appropriate resources  Description: INTERVENTIONS:  - Identify barriers to discharge w/patient and caregiver  - Arrange for needed discharge resources and transportation as appropriate  - Identify discharge learning needs (meds, wound care, etc )  - Arrange for interpretive services to assist at discharge as needed  - Refer to Case Management Department for coordinating discharge planning if the patient needs post-hospital services based on physician/advanced practitioner order or complex needs related to functional status, cognitive ability, or social support system  Outcome: Progressing     Problem: Knowledge Deficit  Goal: Patient/family/caregiver demonstrates understanding of disease process, treatment plan, medications, and discharge instructions  Description: Complete learning assessment and assess knowledge base    Interventions:  - Provide teaching at level of understanding  - Provide teaching via preferred learning methods  Outcome: Progressing     Problem: SKIN/TISSUE INTEGRITY - ADULT  Goal: Skin Integrity remains intact(Skin Breakdown Prevention)  Description: Assess:  -Perform Michael assessment every   -Clean and moisturize skin every   -Inspect skin when repositioning, toileting, and assisting with ADLS  -Assess under medical devices such as  every   -Assess extremities for adequate circulation and sensation     Bed Management:  -Have minimal linens on bed & keep smooth, unwrinkled  -Change linens as needed when moist or perspiring  -Avoid sitting or lying in one position for more than  hours while in bed  -Keep HOB at degrees     Toileting:  -Offer bedside commode  -Assess for incontinence every   -Use incontinent care products after each incontinent episode such as     Activity:  -Mobilize patient  times a day  -Encourage activity and walks on unit  -Encourage or provide ROM exercises   -Turn and reposition patient every  Hours  -Use appropriate equipment to lift or move patient in bed  -Instruct/ Assist with weight shifting every  when out of bed in chair  -Consider limitation of chair time  hour intervals    Skin Care:  -Avoid use of baby powder, tape, friction and shearing, hot water or constrictive clothing  -Relieve pressure over bony prominences using   -Do not massage red bony areas    Next Steps:  -Teach patient strategies to minimize risks such as    -Consider consults to  interdisciplinary teams such as   Outcome: Progressing  Goal: Incision(s), wounds(s) or drain site(s) healing without S/S of infection  Description: INTERVENTIONS  - Assess and document dressing, incision, wound bed, drain sites and surrounding tissue  - Provide patient and family education  - Perform skin care/dressing changes every   Outcome: Progressing  Goal: Pressure injury heals and does not worsen  Description: Interventions:  - Implement low air loss mattress or specialty surface (Criteria met)  - Apply silicone foam dressing  - Instruct/assist with weight shifting every  minutes when in chair   - Limit chair time to  hour intervals  - Use special pressure reducing interventions such as  when in chair   - Apply fecal or urinary incontinence containment device   - Perform passive or active ROM every   - Turn and reposition patient & offload bony prominences every  hours   - Utilize friction reducing device or surface for transfers   - Consider consults to  interdisciplinary teams such as   - Use incontinent care products after each incontinent episode such as   - Consider nutrition services referral as needed  Outcome: Progressing     Problem: Prexisting or High Potential for Compromised Skin Integrity  Goal: Skin integrity is maintained or improved  Description: INTERVENTIONS:  - Identify patients at risk for skin breakdown  - Assess and monitor skin integrity  - Assess and monitor nutrition and hydration status  - Monitor labs   - Assess for incontinence   - Turn and reposition patient  - Assist with mobility/ambulation  - Relieve pressure over bony prominences  - Avoid friction and shearing  - Provide appropriate hygiene as needed including keeping skin clean and dry  - Evaluate need for skin moisturizer/barrier cream  - Collaborate with interdisciplinary team   - Patient/family teaching  - Consider wound care consult   Outcome: Progressing     Problem: MOBILITY - ADULT  Goal: Maintain or return to baseline ADL function  Description: INTERVENTIONS:  -  Assess patient's ability to carry out ADLs; assess patient's baseline for ADL function and identify physical deficits which impact ability to perform ADLs (bathing, care of mouth/teeth, toileting, grooming, dressing, etc )  - Assess/evaluate cause of self-care deficits   - Assess range of motion  - Assess patient's mobility; develop plan if impaired  - Assess patient's need for assistive devices and provide as appropriate  - Encourage maximum independence but intervene and supervise when necessary  - Involve family in performance of ADLs  - Assess for home care needs following discharge   - Consider OT consult to assist with ADL evaluation and planning for discharge  - Provide patient education as appropriate  Outcome: Progressing  Goal: Maintains/Returns to pre admission functional level  Description: INTERVENTIONS:  - Perform BMAT or MOVE assessment daily    - Set and communicate daily mobility goal to care team and patient/family/caregiver  - Collaborate with rehabilitation services on mobility goals if consulted  - Perform Range of Motion  times a day  - Reposition patient every  hours    - Dangle patient  times a day  - Stand patient  times a day  - Ambulate patient  times a day  - Out of bed to chair  times a day   - Out of bed for meals  times a day  - Out of bed for toileting  - Record patient progress and toleration of activity level   Outcome: Progressing

## 2023-02-12 NOTE — PROGRESS NOTES
Dwayne Hodges is a 61 y o  male who is currently ordered Vancomycin IV with management by the Pharmacy Consult service  Relevant clinical data and objective / subjective history reviewed  Vancomycin Assessment:  Indication and Goal AUC/Trough: Other, Intra-abdominal sepsis with possible wound infection, -600, trough >10  Clinical Status: stable  Micro:     Renal Function:  SCr: 1 08 mg/dL  CrCl: 83 5 mL/min  Renal replacement: Not on dialysis  Days of Therapy: 3  Current Dose: 100mg IV q12h  Vancomycin Plan:  New Dosinmg q24h  Estimated AUC: 490 mcg*hr/mL  Estimated Trough: 14 5 mcg/mL  Next Level: 23 with AM labs  Renal Function Monitoring: Daily BMP and [place] will continue to follow closely for s/sx of nephrotoxicity, infusion reactions and appropriateness of therapy  BMP and CBC will be ordered per protocol  We will continue to follow the patient’s culture results and clinical progress daily      Brian Morris, Pharmacist

## 2023-02-12 NOTE — ASSESSMENT & PLAN NOTE
Lab Results   Component Value Date    EGFR 96 02/11/2023    EGFR 68 02/10/2023    EGFR 51 02/09/2023    CREATININE 0 84 02/11/2023    CREATININE 1 17 02/10/2023    CREATININE 1 47 (H) 02/09/2023     · Creatinine 2 5 to; baseline 1 0, improved   · Suspecting possible multifactorial cause in setting of dehydration and sepsis  · Avoid nephrotoxic agents  · Continue with IV fluids  · AM BMP

## 2023-02-12 NOTE — PROGRESS NOTES
[address] [facility]  Progress Note - Bevely Guardian 1965, 61 y o  male MRN: [id number]  Unit/Bed#: -01 Encounter: [id number]  Primary Care Provider: Goyo Lucas MD   Date and time admitted to hospital: 2/7/2023  3:01 PM    Nephrolithiasis  Lungodora Arnold 148 revealed 7 mm nonobstructing right intrarenal calculus  No hydronephrosis noted on CT a/p  Asymptomatic at this time  · S/p  IV fluids  · flomax  · Strain urine  · Intake and output    Hyponatremia  Assessment & Plan  · Sodium 129 on ED presentation, improving  · Suspected to be in the setting of dehydration  · AM BMP recheck     Hyperkalemia  Assessment & Plan  Potassium 5 9 on ED presentation, resolved  · Suspected be in setting of ALEXY  · AM BMP recheck ordered     Abnormal CT scan  Assessment & Plan  · CT shows interval decrease in bilateral pleural effusions, however multiple small bilateral lung nodules are seen which are concerning for possible metastatic lung disease  · Patient reports having prior known lung nodules  · Patient does have some complaints of SOB, however is oxygenating appropriately on room air; continue to monitor oxygenation  · Outpatient f/u    Elevated alkaline phosphatase level  Assessment & Plan  Chronically elevated likely in setting of known hepatic metastasis  · RUQ US Decompressed gallbladder around a cholecystostomy tube, limiting evaluation  Hepatomegaly  Heterogeneous echotexture of the liver in this patient with known metastatic disease  7 mm nonobstructing right intrarenal calculus    · Monitor with am CMP    Acute on chronic kidney failure [facility])  Assessment & Plan  Lab Results   Component Value Date    EGFR 96 02/11/2023    EGFR 68 02/10/2023    EGFR 51 02/09/2023    CREATININE 0 84 02/11/2023    CREATININE 1 17 02/10/2023    CREATININE 1 47 (H) 02/09/2023     · Creatinine 2 5 to; baseline 1 0, improved   · Suspecting possible multifactorial cause in setting of dehydration and sepsis  · Avoid nephrotoxic agents  · Continue with IV fluids  · AM BMP    Colon cancer metastasized to liver [facility])  Assessment & Plan  Follows w/ outpatient [facility] onc and palliative care  Extensive abdominal surgical history (11/7 ex lap w/ section 3 liver resection, and development of left upper quadrant hematoma and suspected leak from transverse colon anastomosis complication; 89/66 right hemicolectomy; 11/17 re-exploration w/partial descending colectomy; 12/8 IR percutaneous cholecystostomy tube and hepatectomy abscess drainage; 12/20 IR drains placed for perisplenic abscess)  · Patient does not wish to follow with [facility] oncology anymore as it is too far of a drive; would like to establish with [facility] oncology (already follows with Dr Ruthie Quan)  · Continue outpatient f/u    Benign essential hypertension  Assessment & Plan  BP stable  · Continue prehospital HTN medications  · Monitor BP per unit protocol    Type 2 diabetes mellitus without complication, with long-term current use of insulin (HCC)  Assessment & Plan    Lab Results   Component Value Date    HGBA1C 8 0 (H) 09/26/2022     ·  home Lantus 8U qHS and humalog 4U TID w/meals   · Dose reduce to 5U qhs + 4 U TID   · 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  · Hypoglycemia protocol  · Diabetic diet     * Sepsis [facility])  Assessment & Plan  Patient with extensive abdominal surgical history, reports was recently discharged from rehab after prolonged hospitalization several weeks ago  Since then he has had increasing fatigue and generalized weakness, which has been particularly worse over the past several days  Over this timeframe he is also noted the development of foul-smelling drainage and increasing pain from his chronic abdominal wound  Otherwise he reports some mild SOB, and is without other symptoms/complaint at this time    · As evidenced by tachycardia, tachypnea, and leukocytosis  · In setting of purulent abdominal infection  · Procal 1 07  · Lactic intially 2 4; cleared after IVF in ED   · Hx of colon CA w/ extensive abd surgical hx as below   · Patient has several abdominal drains in place, with CT showing improvement in the fluid collections at the left liver lobe, perisplenic area, and the left retroperitoneum inferior to the spleen  · ED gave full 30cc/kg IVF bolus + IV zosyn  · Started on IV meropenem per ID, transitioned to IV ertapenem and IV vancomycin based on wound cultures- now growing ESBL and MRSA  · Trend WBC; follow-up with pending infectious labs and cultures  · ID consulted; recommendations appreciated   · S/p bedside midline wound debridement + wet dressing 2/8 with general surgery          VTE Pharmacologic Prophylaxis: VTE Score: 6 Moderate Risk (Score 3-4) - Pharmacological DVT Prophylaxis Ordered: heparin  Patient Centered Rounds: I performed bedside rounds with nursing staff today  Discussions with Specialists or Other Care Team Provider: JERED HESS      Education and Discussions with Family / Patient: Attempted to update  (wife) via phone  Left voicemail  Time Spent for Care: 45 minutes  More than 50% of total time spent on counseling and coordination of care as described above  Current Length of Stay: 5 day(s)  Current Patient Status: Inpatient   Certification Statement: The patient will continue to require additional inpatient hospital stay due to IV antibiotics  Discharge Plan: Anticipate discharge in 24-48 hrs to home with home services  Code Status: Level 1 - Full Code    Subjective:   Pt reports improvement in nausea and vomiting  Denies chest pain, shortness of breath, fevers or chills  Review of Systems   Constitutional: Negative for activity change, appetite change, fatigue and fever  HENT: Negative for congestion, postnasal drip, rhinorrhea and sinus pain  Eyes: Negative for photophobia, pain and discharge  Respiratory: Negative for apnea, cough, chest tightness, shortness of breath and wheezing  Cardiovascular: Negative for chest pain and leg swelling  Gastrointestinal: Negative for abdominal distention, abdominal pain, constipation, diarrhea, nausea and vomiting  Endocrine: Negative for polyuria  Genitourinary: Negative for decreased urine volume, difficulty urinating, dysuria, hematuria and urgency  Musculoskeletal: Negative for back pain, myalgias and neck pain  Skin: Negative for pallor, rash and wound  Neurological: Negative for dizziness, tremors, weakness, light-headedness and headaches  Hematological: Does not bruise/bleed easily  Psychiatric/Behavioral: Negative for agitation, behavioral problems and suicidal ideas  The patient is not nervous/anxious and is not hyperactive  Objective:     Vitals:   Temp (24hrs), Av 5 °F (36 9 °C), Min:98 4 °F (36 9 °C), Max:98 5 °F (36 9 °C)    Temp:  [98 4 °F (36 9 °C)-98 5 °F (36 9 °C)] 98 5 °F (36 9 °C)  HR:  [] 100  Resp:  [20] 20  BP: (115-118)/(78-79) 115/78  SpO2:  [96 %-98 %] 96 %  Body mass index is 27 98 kg/m²  Input and Output Summary (last 24 hours): Intake/Output Summary (Last 24 hours) at 2023 0734  Last data filed at 2023 0409  Gross per 24 hour   Intake 780 ml   Output 1250 ml   Net -470 ml       Physical Exam:   Physical Exam  Vitals reviewed  Constitutional:       General: He is not in acute distress  Appearance: He is well-developed  He is not diaphoretic  HENT:      Head: Normocephalic and atraumatic  Mouth/Throat:      Pharynx: No oropharyngeal exudate  Eyes:      General: No scleral icterus  Extraocular Movements: Extraocular movements intact  Conjunctiva/sclera: Conjunctivae normal    Neck:      Vascular: No JVD  Trachea: No tracheal deviation  Cardiovascular:      Rate and Rhythm: Normal rate and regular rhythm  Heart sounds: No murmur heard  No friction rub  No gallop  Pulmonary:      Effort: Pulmonary effort is normal  No respiratory distress  Breath sounds: No stridor  No wheezing  Abdominal:      General: There is no distension  Palpations: There is no mass  Tenderness: There is no abdominal tenderness  There is no right CVA tenderness or left CVA tenderness  Comments: Midline wound, dressing dry  Musculoskeletal:         General: No tenderness  Normal range of motion  Right lower leg: No edema  Left lower leg: No edema  Skin:     General: Skin is warm and dry  Coloration: Skin is pale  Findings: No erythema  Neurological:      Mental Status: He is alert and oriented to person, place, and time  Psychiatric:         Behavior: Behavior normal          Thought Content: Thought content normal           Additional Data:     Labs:  Results from last 7 days   Lab Units 02/12/23  0459 02/11/23  0553   WBC Thousand/uL 9 12 8 10   HEMOGLOBIN g/dL 7 2* 7 4*   HEMATOCRIT % 23 8* 23 9*   PLATELETS Thousands/uL 304 273   NEUTROS PCT %  --  77*   LYMPHS PCT %  --  10*   MONOS PCT %  --  10   EOS PCT %  --  0     Results from last 7 days   Lab Units 02/11/23  0512 02/10/23  0511 02/09/23  0635   SODIUM mmol/L 131*   < > 134*   POTASSIUM mmol/L 4 1   < > 4 4   CHLORIDE mmol/L 97   < > 103   CO2 mmol/L 22   < > 21   BUN mg/dL 12   < > 33*   CREATININE mg/dL 0 84   < > 1 47*   ANION GAP mmol/L 12   < > 10   CALCIUM mg/dL 7 1*   < > 8 4   ALBUMIN g/dL  --   --  1 3*   TOTAL BILIRUBIN mg/dL  --   --  0 68   ALK PHOS U/L  --   --  925*   ALT U/L  --   --  17   AST U/L  --   --  59*   GLUCOSE RANDOM mg/dL 119   < > 107    < > = values in this interval not displayed       Results from last 7 days   Lab Units 02/07/23  1530   INR  1 32*     Results from last 7 days   Lab Units 02/11/23  2108 02/11/23  1612 02/11/23  1115 02/11/23  0713 02/10/23  2253 02/10/23  2040 02/10/23  1608 02/10/23  1149 02/10/23  0801 02/09/23  2011 02/09/23  1552 02/09/23  1108   POC GLUCOSE mg/dl 144* 109 165* 123 146* 158* 127 158* 137 166* 118 192* Results from last 7 days   Lab Units 02/11/23  0512 02/10/23  0955 02/08/23  0337 02/07/23  1759 02/07/23  1530   LACTIC ACID mmol/L  --  0 7  --  1 2 2 4*   PROCALCITONIN ng/ml 0 61*  --  1 37*  --  1 07*       Lines/Drains:  Invasive Devices     Central Venous Catheter Line  Duration           Port A Cath 03/10/22 Right Chest 338 days          Peripheral Intravenous Line  Duration           Peripheral IV 02/07/23 Left;Proximal;Ventral (anterior) Forearm 4 days          Drain  Duration           Ileostomy LUQ 86 days    Cholecystostomy Tube 61 days    Abscess Drain Abdomen 30 days                Central Line:  Goal for removal: N/A - Chronic PICC             Imaging: No pertinent imaging reviewed  Recent Cultures (last 7 days):   Results from last 7 days   Lab Units 02/07/23  1759 02/07/23  1530   BLOOD CULTURE   --  No Growth After 4 Days  No Growth After 4 Days     GRAM STAIN RESULT  3+ Gram positive rods*  1+ Gram positive cocci in chains*  Rare Disintegrating polys*  --    WOUND CULTURE  3+ Growth of Escherichia coli ESBL*  3+ Growth of Methicillin Resistant Staphylococcus aureus*  3+ Growth of  --        Last 24 Hours Medication List:   Current Facility-Administered Medications   Medication Dose Route Frequency Provider Last Rate   • acetaminophen  650 mg Oral Q4H PRN Angely Blakely PA-C     • amLODIPine  2 5 mg Oral Daily Angely Blakely PA-C     • aspirin  81 mg Oral Daily [place], [place]     • atorvastatin  40 mg Oral Daily [place]     • collagenase   Topical Daily Annabelle Lopez PA-C     • DULoxetine  30 mg Oral Daily [place], [place]     • ertapenem  1,000 mg Intravenous Q24H Venetta Mohs, MD 1,000 mg (02/11/23 0950)   • heparin (porcine)  5,000 Units Subcutaneous [facility] s[facility]     • HYDROmorphone  0 2 mg Intravenous Q4H PRN Nelia Ambrosio DO     • influenza vaccine  0 5 mL Intramuscular Prior to discharge Monty Ramirez MD     • insulin glargine  5 Units Subcutaneous HS Kiara Wang PA-C     • insulin lispro  1-6 Units Subcutaneous TID AC Fredi sanchez PA-C     • insulin lispro  1-6 Units Subcutaneous HS Fredi sanchez PA-C     • insulin lispro  4 Units Subcutaneous TID With Meals Ld Rodriguez PA-C     • melatonin  6 mg Oral HS PRN Patricia Rosario PA-C     • methocarbamol  500 mg Oral BID PRN Ld Rodriguez PA-C     • ondansetron  4 mg Intravenous Q4H PRN Nelia Kenzie Saraiya, DO     • oxyCODONE  5 mg Oral Q4H PRN Nelia Kenzie Saraiya, DO     • oxyCODONE  2 5 mg Oral Q4H PRN Nelia Kenzie Saraiya, DO     • pantoprazole  40 mg Oral BID Ld Rodriguez PA-C     • pneumococcal 13-valent conjugate vaccine  0 5 mL Intramuscular Prior to discharge Genie Washburn MD     • simethicone  80 mg Oral 4x Daily (with meals and at bedtime) Ld Rodriguez PA-C     • sodium chloride (PF)  10 mL Intravenous Daily Nelia Kenzie Saraiya, DO     • tamsulosin  0 4 mg Oral Daily [facility] [address] [facility]     • vancomycin  1,000 mg Intravenous Q12H Nelia Kenzie Saraiya, DO 1,000 mg (02/11/23 2224)        Today, Patient Was Seen By: Dexter Kendall DO    **Please Note: This note may have been constructed using a voice recognition system  **

## 2023-02-12 NOTE — ASSESSMENT & PLAN NOTE
Lab Results   Component Value Date    HGBA1C 8 0 (H) 09/26/2022     ·  home Lantus 8U qHS and humalog 4U TID w/meals   · Dose reduce to 5U qhs + 4 U TID   · 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  · Hypoglycemia protocol  · Diabetic diet

## 2023-02-12 NOTE — ASSESSMENT & PLAN NOTE
Follows w/ outpatient [facility] onc and palliative care   Extensive abdominal surgical history (11/7 ex lap w/ section 3 liver resection, and development of left upper quadrant hematoma and suspected leak from transverse colon anastomosis complication; 46/36 right hemicolectomy; 11/17 re-exploration w/partial descending colectomy; 12/8 IR percutaneous cholecystostomy tube and hepatectomy abscess drainage; 12/20 IR drains placed for perisplenic abscess)  · Patient does not wish to follow with [place] oncology anymore as it is too far of a drive; would like to establish with [facility] oncology (already follows with Dr Mónica Gill)  · Continue outpatient f/u

## 2023-02-13 LAB
ANION GAP SERPL CALCULATED.3IONS-SCNC: 9 MMOL/L (ref 4–13)
BUN SERPL-MCNC: 20 MG/DL (ref 5–25)
CALCIUM SERPL-MCNC: 7.9 MG/DL (ref 8.3–10.1)
CHLORIDE SERPL-SCNC: 99 MMOL/L (ref 96–108)
CO2 SERPL-SCNC: 25 MMOL/L (ref 21–32)
CREAT SERPL-MCNC: 1.25 MG/DL (ref 0.6–1.3)
ERYTHROCYTE [DISTWIDTH] IN BLOOD BY AUTOMATED COUNT: 17.2 % (ref 11.6–15.1)
FERRITIN SERPL-MCNC: 2911 NG/ML (ref 8–388)
FOLATE SERPL-MCNC: 7.7 NG/ML (ref 3.1–17.5)
GFR SERPL CREATININE-BSD FRML MDRD: 63 ML/MIN/1.73SQ M
GLUCOSE SERPL-MCNC: 121 MG/DL (ref 65–140)
GLUCOSE SERPL-MCNC: 121 MG/DL (ref 65–140)
GLUCOSE SERPL-MCNC: 147 MG/DL (ref 65–140)
GLUCOSE SERPL-MCNC: 154 MG/DL (ref 65–140)
GLUCOSE SERPL-MCNC: 196 MG/DL (ref 65–140)
HCT VFR BLD AUTO: 23.4 % (ref 36.5–49.3)
HCT VFR BLD AUTO: 24.7 % (ref 36.5–49.3)
HGB BLD-MCNC: 7.1 G/DL (ref 12–17)
HGB BLD-MCNC: 7.4 G/DL (ref 12–17)
IRON SATN MFR SERPL: 31 % (ref 20–50)
IRON SERPL-MCNC: 30 UG/DL (ref 65–175)
MCH RBC QN AUTO: 25.1 PG (ref 26.8–34.3)
MCHC RBC AUTO-ENTMCNC: 30.3 G/DL (ref 31.4–37.4)
MCV RBC AUTO: 83 FL (ref 82–98)
PLATELET # BLD AUTO: 316 THOUSANDS/UL (ref 149–390)
PMV BLD AUTO: 9.9 FL (ref 8.9–12.7)
POTASSIUM SERPL-SCNC: 3.9 MMOL/L (ref 3.5–5.3)
PROCALCITONIN SERPL-MCNC: 0.75 NG/ML
RBC # BLD AUTO: 2.83 MILLION/UL (ref 3.88–5.62)
SODIUM SERPL-SCNC: 133 MMOL/L (ref 135–147)
TIBC SERPL-MCNC: 96 UG/DL (ref 250–450)
VIT B12 SERPL-MCNC: 1238 PG/ML (ref 100–900)
WBC # BLD AUTO: 9.71 THOUSAND/UL (ref 4.31–10.16)

## 2023-02-13 RX ORDER — VANCOMYCIN HYDROCHLORIDE 1 G/200ML
1000 INJECTION, SOLUTION INTRAVENOUS EVERY 24 HOURS
Status: COMPLETED | OUTPATIENT
Start: 2023-02-13 | End: 2023-02-16

## 2023-02-13 RX ORDER — SODIUM CHLORIDE, SODIUM GLUCONATE, SODIUM ACETATE, POTASSIUM CHLORIDE, MAGNESIUM CHLORIDE, SODIUM PHOSPHATE, DIBASIC, AND POTASSIUM PHOSPHATE .53; .5; .37; .037; .03; .012; .00082 G/100ML; G/100ML; G/100ML; G/100ML; G/100ML; G/100ML; G/100ML
75 INJECTION, SOLUTION INTRAVENOUS CONTINUOUS
Status: DISPENSED | OUTPATIENT
Start: 2023-02-13 | End: 2023-02-14

## 2023-02-13 RX ADMIN — INSULIN LISPRO 2 UNITS: 100 INJECTION, SOLUTION INTRAVENOUS; SUBCUTANEOUS at 12:01

## 2023-02-13 RX ADMIN — ERTAPENEM SODIUM 1000 MG: 1 INJECTION, POWDER, LYOPHILIZED, FOR SOLUTION INTRAMUSCULAR; INTRAVENOUS at 08:46

## 2023-02-13 RX ADMIN — HYDROMORPHONE HYDROCHLORIDE 0.2 MG: 0.2 INJECTION, SOLUTION INTRAMUSCULAR; INTRAVENOUS; SUBCUTANEOUS at 23:24

## 2023-02-13 RX ADMIN — HEPARIN SODIUM 5000 UNITS: 5000 INJECTION INTRAVENOUS; SUBCUTANEOUS at 22:19

## 2023-02-13 RX ADMIN — INSULIN GLARGINE 5 UNITS: 100 INJECTION, SOLUTION SUBCUTANEOUS at 22:19

## 2023-02-13 RX ADMIN — HEPARIN SODIUM 5000 UNITS: 5000 INJECTION INTRAVENOUS; SUBCUTANEOUS at 06:53

## 2023-02-13 RX ADMIN — TAMSULOSIN HYDROCHLORIDE 0.4 MG: 0.4 CAPSULE ORAL at 16:49

## 2023-02-13 RX ADMIN — DULOXETINE HYDROCHLORIDE 30 MG: 30 CAPSULE, DELAYED RELEASE ORAL at 08:28

## 2023-02-13 RX ADMIN — SODIUM CHLORIDE, PRESERVATIVE FREE 10 ML: 5 INJECTION INTRAVENOUS at 08:46

## 2023-02-13 RX ADMIN — INSULIN LISPRO 4 UNITS: 100 INJECTION, SOLUTION INTRAVENOUS; SUBCUTANEOUS at 12:01

## 2023-02-13 RX ADMIN — INSULIN LISPRO 1 UNITS: 100 INJECTION, SOLUTION INTRAVENOUS; SUBCUTANEOUS at 22:18

## 2023-02-13 RX ADMIN — SODIUM CHLORIDE, SODIUM GLUCONATE, SODIUM ACETATE, POTASSIUM CHLORIDE, MAGNESIUM CHLORIDE, SODIUM PHOSPHATE, DIBASIC, AND POTASSIUM PHOSPHATE 75 ML/HR: .53; .5; .37; .037; .03; .012; .00082 INJECTION, SOLUTION INTRAVENOUS at 22:20

## 2023-02-13 RX ADMIN — PANTOPRAZOLE SODIUM 40 MG: 40 TABLET, DELAYED RELEASE ORAL at 16:49

## 2023-02-13 RX ADMIN — VANCOMYCIN HYDROCHLORIDE 1000 MG: 1 INJECTION, SOLUTION INTRAVENOUS at 22:19

## 2023-02-13 RX ADMIN — SODIUM CHLORIDE, SODIUM GLUCONATE, SODIUM ACETATE, POTASSIUM CHLORIDE, MAGNESIUM CHLORIDE, SODIUM PHOSPHATE, DIBASIC, AND POTASSIUM PHOSPHATE 75 ML/HR: .53; .5; .37; .037; .03; .012; .00082 INJECTION, SOLUTION INTRAVENOUS at 08:28

## 2023-02-13 RX ADMIN — SIMETHICONE 80 MG: 80 TABLET, CHEWABLE ORAL at 12:01

## 2023-02-13 RX ADMIN — SIMETHICONE 80 MG: 80 TABLET, CHEWABLE ORAL at 16:49

## 2023-02-13 RX ADMIN — INSULIN LISPRO 4 UNITS: 100 INJECTION, SOLUTION INTRAVENOUS; SUBCUTANEOUS at 08:32

## 2023-02-13 RX ADMIN — AMLODIPINE BESYLATE 2.5 MG: 2.5 TABLET ORAL at 08:28

## 2023-02-13 RX ADMIN — HEPARIN SODIUM 5000 UNITS: 5000 INJECTION INTRAVENOUS; SUBCUTANEOUS at 13:22

## 2023-02-13 RX ADMIN — ATORVASTATIN CALCIUM 40 MG: 40 TABLET, FILM COATED ORAL at 08:28

## 2023-02-13 RX ADMIN — OXYCODONE HYDROCHLORIDE 5 MG: 5 TABLET ORAL at 22:19

## 2023-02-13 RX ADMIN — SIMETHICONE 80 MG: 80 TABLET, CHEWABLE ORAL at 08:28

## 2023-02-13 RX ADMIN — ASPIRIN 81 MG 81 MG: 81 TABLET ORAL at 08:28

## 2023-02-13 RX ADMIN — PANTOPRAZOLE SODIUM 40 MG: 40 TABLET, DELAYED RELEASE ORAL at 08:28

## 2023-02-13 RX ADMIN — SIMETHICONE 80 MG: 80 TABLET, CHEWABLE ORAL at 22:19

## 2023-02-13 RX ADMIN — INSULIN LISPRO 4 UNITS: 100 INJECTION, SOLUTION INTRAVENOUS; SUBCUTANEOUS at 17:36

## 2023-02-13 RX ADMIN — COLLAGENASE SANTYL: 250 OINTMENT TOPICAL at 08:28

## 2023-02-13 NOTE — PROGRESS NOTES
Progress Note - Infectious Disease   Rudene Record 61 y o  male MRN: [id number]  Unit/Bed#: -01 Encounter: [id number]      Impression/Plan:  1   Systemic inflammatory response syndrome   Leukocytosis and tachycardia   Present on admission   Possible sepsis without a well-defined source   The patient CT chest abdomen and pelvis does not reveal any new source of sepsis   Urinalysis nondiagnostic for UTI   The abdominal wound has been debrided   Unclear significance of the extremely high alkaline phosphatase level with the total bilirubin normal and the remainder of the transaminases not proportionally high   Consideration for the possibility of bacteremia   Right upper quadrant ultrasound without biliary ductal dilatation   Wound culture with ESBL and MRSA   No recurrence of the fever and the white cell count has come down substantially  -Continue vancomycin and ertapenem for now  -Pharmacy follow-up for vancomycin dose management  -Surgery follow-up  -Recheck CBC with differential, CMP, procalcitonin level  -Source control measures as below  -Supportive care     2   Intra-abdominal abscesses   Status post IR drainage with drains remaining in place but with improved findings on repeat CT scan   Patient completed several weeks of intravenous antibiotics   No evidence of a new abscess   -Antibiotics as above for now  -Surgery follow-up  -Serial exams     3   ESBL E  coli bacteremia/abscess formation   Status post prolonged course of intravenous antibiotics with clearance of the bacteremia   Abscess is improved as above  -Antibiotics as above for now  -Follow-up blood cultures     4   Abdominal wound   No overt cellulitis or purulence seen although there is some exudate   Possible mild wound infection that is status post debridement by surgery   ESBL and possible MRSA    -Local wound care  -Serial abdominal exams  -Surgery follow-up  -Antibiotics as above     5   Metastatic colon cancer   Status post extensive surgical resection with reexploration in the setting of complication     6   Acute kidney injury   Suspect secondary to prerenal issues   No other clear source appreciated   Renal function improving and now stabilized with IV fluid resuscitation   -Recheck BMP  -Volume management  -Dose adjust the antibiotics as needed    Discussed the above management plan in detail with the primary service  They agree with the antibiotic plan    I spent 50 minutes on the unit floor of which 30 minutes was in counseling/coordination of care as outlined in my note including a prolonged discussion with the patient and his wife as far as the meaning of MRSA colonization and infection and risk of transmission and prevalence in the community    Antibiotics:  Ertapenem 4  Vancomycin 4  Antibiotics 7    Subjective:  Patient has no fever, chills, sweats; no nausea, vomiting, diarrhea; no cough, shortness of breath; no pain  No new symptoms  Objective:  Vitals:  Temp:  [98 3 °F (36 8 °C)-98 4 °F (36 9 °C)] 98 3 °F (36 8 °C)  HR:  [69-97] 69  Resp:  [16-18] 18  BP: (110-117)/(74-77) 110/75  SpO2:  [96 %-99 %] 98 %  Temp (24hrs), Av 4 °F (36 9 °C), Min:98 3 °F (36 8 °C), Max:98 4 °F (36 9 °C)  Current: Temperature: 98 3 °F (36 8 °C)    Physical Exam:   General Appearance:  Alert, interactive, nontoxic, no acute distress  Throat: Oropharynx moist without lesions  Lungs:   Clear to auscultation bilaterally; no wheezes, rhonchi or rales; respirations unlabored   Heart:  RRR; no murmur, rub or gallop   Abdomen:   Soft, non-tender, non-distended, positive bowel sounds  Ostomy with output  Wound dressed with a dry dressing in place  Right upper quadrant biliary drain in place  VICTORINO drain in place  Extremities: No clubbing, cyanosis or edema   Skin: No new rashes or lesions  No draining wounds noted         Labs, Imaging, & Other studies:   All pertinent labs and imaging studies were personally reviewed  Results from last 7 days Lab Units 02/13/23  0505 02/12/23  0459 02/11/23  0553   WBC Thousand/uL 9 71 9 12 8 10   HEMOGLOBIN g/dL 7 1* 7 2* 7 4*   PLATELETS Thousands/uL 316 304 273     Results from last 7 days   Lab Units 02/13/23  0505 02/12/23  1015 02/11/23  0512 02/10/23  0511 02/09/23  0635 02/08/23  0337   SODIUM mmol/L 133* 130* 131*   < > 134* 134*   POTASSIUM mmol/L 3 9 3 8 4 1   < > 4 4 4 8   CHLORIDE mmol/L 99 97 97   < > 103 102   CO2 mmol/L 25 25 22   < > 21 21   BUN mg/dL 20 15 12   < > 33* 53*   CREATININE mg/dL 1 25 1 08 0 84   < > 1 47* 1 93*   EGFR ml/min/1 73sq m 63 75 96   < > 51 37   CALCIUM mg/dL 7 9* 7 9* 7 1*   < > 8 4 8 6   AST U/L  --  49*  --   --  59* 61*   ALT U/L  --  14  --   --  17 20   ALK PHOS U/L  --  843*  --   --  925* 1,043*    < > = values in this interval not displayed  Results from last 7 days   Lab Units 02/07/23  1759 02/07/23  1530   BLOOD CULTURE   --  No Growth After 5 Days  No Growth After 5 Days     GRAM STAIN RESULT  3+ Gram positive rods*  1+ Gram positive cocci in chains*  Rare Disintegrating polys*  --    WOUND CULTURE  3+ Growth of Escherichia coli ESBL*  3+ Growth of Methicillin Resistant Staphylococcus aureus*  3+ Growth of  --      Results from last 7 days   Lab Units 02/13/23  0505 02/11/23  0512 02/08/23  0337 02/07/23  1530   PROCALCITONIN ng/ml 0 75* 0 61* 1 37* 1 07*

## 2023-02-13 NOTE — PROGRESS NOTES
[address]  Progress Note - Sagrario Norman 1965, 61 y o  male MRN: [id number]  Unit/Bed#: -01 Encounter: [id number]  Primary Care Provider: Barbi Veras MD   Date and time admitted to hospital: 2/7/2023  3:01 PM    Nephrolithiasis  [address]ung[address] revealed 7 mm nonobstructing right intrarenal calculus  No hydronephrosis noted on CT a/p  Asymptomatic at this time  · S/p  IV fluids  · flomax  · Strain urine  · Intake and output    Hyponatremia  Assessment & Plan  · Sodium 129 on ED presentation, improving 133  · Suspected to be in the setting of dehydration  · AM BMP recheck     Hyperkalemia  Assessment & Plan  Potassium 5 9 on ED presentation, resolved  · Suspected be in setting of ALEXY  · AM BMP recheck ordered     Abnormal CT scan  Assessment & Plan  · CT shows interval decrease in bilateral pleural effusions, however multiple small bilateral lung nodules are seen which are concerning for possible metastatic lung disease  · Patient reports having prior known lung nodules  · Patient does have some complaints of SOB, however is oxygenating appropriately on room air; continue to monitor oxygenation  · Outpatient f/u    Elevated alkaline phosphatase level  Assessment & Plan  Chronically elevated likely in setting of known hepatic metastasis  · RUQ US Decompressed gallbladder around a cholecystostomy tube, limiting evaluation  Hepatomegaly  Heterogeneous echotexture of the liver in this patient with known metastatic disease  7 mm nonobstructing right intrarenal calculus    · Monitor with am CMP    Acute on chronic kidney failure [facility])  Assessment & Plan  Lab Results   Component Value Date    EGFR 63 02/13/2023    EGFR 75 02/12/2023    EGFR 96 02/11/2023    CREATININE 1 25 02/13/2023    CREATININE 1 08 02/12/2023    CREATININE 0 84 02/11/2023     · Creatinine 2 5 to; baseline 1 0, improved   · Suspecting possible multifactorial cause in setting of dehydration and sepsis  · Avoid nephrotoxic agents  · Continue with IV fluids  · AM BMP    Colon cancer metastasized to liver [facility])  Assessment & Plan  Follows w/ outpatient [facility] onc and palliative care  Extensive abdominal surgical history (11/7 ex lap w/ section 3 liver resection, and development of left upper quadrant hematoma and suspected leak from transverse colon anastomosis complication; 41/40 right hemicolectomy; 11/17 re-exploration w/partial descending colectomy; 12/8 IR percutaneous cholecystostomy tube and hepatectomy abscess drainage; 12/20 IR drains placed for perisplenic abscess)  · Patient does not wish to follow with [place] oncology anymore as it is too far of a drive; would like to establish with [facility] oncology (already follows with Dr Ruthie Quan)  · Continue outpatient f/u    Benign essential hypertension  Assessment & Plan  BP stable  · Continue prehospital HTN medications  · Monitor BP per unit protocol    Type 2 diabetes mellitus without complication, with long-term current use of insulin (HCC)  Assessment & Plan    Lab Results   Component Value Date    HGBA1C 8 0 (H) 09/26/2022     ·  home Lantus 8U qHS and humalog 4U TID w/meals   · Dose reduce to 5U qhs + 4 U TID   · 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  · Hypoglycemia protocol  · Diabetic diet     * Sepsis [facility])  Assessment & Plan  Patient with extensive abdominal surgical history, reports was recently discharged from rehab after prolonged hospitalization several weeks ago  Since then he has had increasing fatigue and generalized weakness, which has been particularly worse over the past several days  Over this timeframe he is also noted the development of foul-smelling drainage and increasing pain from his chronic abdominal wound  Otherwise he reports some mild SOB, and is without other symptoms/complaint at this time    · As evidenced by tachycardia, tachypnea, and leukocytosis  · In setting of purulent abdominal infection  · Procal downtrending  · Lactic intially 2 4; cleared after IVF in ED   · Hx of colon CA w/ extensive abd surgical hx as below   · Patient has several abdominal drains in place, with CT showing improvement in the fluid collections at the left liver lobe, perisplenic area, and the left retroperitoneum inferior to the spleen  · Started on IV meropenem per ID, transitioned to IV ertapenem and IV vancomycin based on wound cultures- now growing ESBL and MRSA  · Trend WBC; follow-up with pending infectious labs and cultures  · ID consulted; recommendations appreciated   · S/p bedside midline wound debridement + wet dressing 2/8 with general surgery            VTE Pharmacologic Prophylaxis: VTE Score: 6 High Risk (Score >/= 5) - Pharmacological DVT Prophylaxis Ordered: heparin  Sequential Compression Devices Ordered  Patient Centered Rounds: I performed bedside rounds with nursing staff today  Discussions with Specialists or Other Care Team Provider: JERED HESS    Education and Discussions with Family / Patient: Updated  (wife) at bedside  Time Spent for Care: 60 minutes  More than 50% of total time spent on counseling and coordination of care as described above  Current Length of Stay: 6 day(s)  Current Patient Status: Inpatient   Certification Statement: The patient will continue to require additional inpatient hospital stay due to IV antibiotics  Discharge Plan: Anticipate discharge in 24-48 hrs to rehab facility  Code Status: Level 1 - Full Code    Subjective:   Seen and examined at bedside  Notes improvement in appetite  No further episodes of nausea or vomiting  Denies fevers or chills  Wife Present at bedside  Review of Systems   Constitutional: Negative for activity change, appetite change, fatigue and fever  HENT: Negative for congestion, postnasal drip, rhinorrhea and sinus pain  Eyes: Negative for photophobia, pain and discharge     Respiratory: Negative for apnea, cough, chest tightness, shortness of breath and wheezing  Cardiovascular: Negative for chest pain and leg swelling  Gastrointestinal: Negative for abdominal distention, abdominal pain, constipation, diarrhea, nausea and vomiting  Endocrine: Negative for polyuria  Genitourinary: Negative for decreased urine volume, difficulty urinating, dysuria, hematuria and urgency  Musculoskeletal: Negative for back pain, myalgias and neck pain  Skin: Negative for pallor, rash and wound  Neurological: Positive for weakness  Negative for dizziness, tremors, light-headedness and headaches  Hematological: Does not bruise/bleed easily  Psychiatric/Behavioral: Negative for agitation, behavioral problems and suicidal ideas  The patient is not nervous/anxious and is not hyperactive  Objective:     Vitals:   Temp (24hrs), Av 4 °F (36 9 °C), Min:98 3 °F (36 8 °C), Max:98 4 °F (36 9 °C)    Temp:  [98 3 °F (36 8 °C)-98 4 °F (36 9 °C)] 98 3 °F (36 8 °C)  HR:  [69-97] 69  Resp:  [16-18] 18  BP: (110-117)/(74-77) 110/75  SpO2:  [95 %-99 %] 98 %  Body mass index is 27 98 kg/m²  Input and Output Summary (last 24 hours): Intake/Output Summary (Last 24 hours) at 2023 0818  Last data filed at 2023 0001  Gross per 24 hour   Intake 10 ml   Output 925 ml   Net -915 ml       Physical Exam:   Physical Exam  Vitals reviewed  Constitutional:       General: He is not in acute distress  Appearance: He is well-developed  He is not diaphoretic  HENT:      Head: Normocephalic and atraumatic  Mouth/Throat:      Pharynx: No oropharyngeal exudate  Eyes:      General: No scleral icterus  Extraocular Movements: Extraocular movements intact  Conjunctiva/sclera: Conjunctivae normal    Neck:      Vascular: No JVD  Trachea: No tracheal deviation  Cardiovascular:      Rate and Rhythm: Normal rate and regular rhythm  Heart sounds: No murmur heard  No friction rub  No gallop     Pulmonary:      Effort: Pulmonary effort is normal  No respiratory distress  Breath sounds: No stridor  No wheezing  Abdominal:      General: There is no distension  Palpations: Abdomen is soft  There is no mass  Tenderness: There is no abdominal tenderness  There is no right CVA tenderness or left CVA tenderness  Comments: biliary drain in place   Musculoskeletal:         General: No tenderness  Right lower leg: No edema  Left lower leg: No edema  Comments: Generalized weakness   Skin:     General: Skin is warm and dry  Coloration: Skin is pale  Findings: No erythema  Neurological:      Mental Status: He is alert and oriented to person, place, and time  Psychiatric:         Behavior: Behavior normal          Thought Content: Thought content normal           Additional Data:     Labs:  Results from last 7 days   Lab Units 02/13/23  0505 02/12/23  0459 02/11/23  0553   WBC Thousand/uL 9 71   < > 8 10   HEMOGLOBIN g/dL 7 1*   < > 7 4*   HEMATOCRIT % 23 4*   < > 23 9*   PLATELETS Thousands/uL 316   < > 273   NEUTROS PCT %  --   --  77*   LYMPHS PCT %  --   --  10*   MONOS PCT %  --   --  10   EOS PCT %  --   --  0    < > = values in this interval not displayed       Results from last 7 days   Lab Units 02/13/23  0505 02/12/23  1015   SODIUM mmol/L 133* 130*   POTASSIUM mmol/L 3 9 3 8   CHLORIDE mmol/L 99 97   CO2 mmol/L 25 25   BUN mg/dL 20 15   CREATININE mg/dL 1 25 1 08   ANION GAP mmol/L 9 8   CALCIUM mg/dL 7 9* 7 9*   ALBUMIN g/dL  --  1 2*   TOTAL BILIRUBIN mg/dL  --  0 53   ALK PHOS U/L  --  843*   ALT U/L  --  14   AST U/L  --  49*   GLUCOSE RANDOM mg/dL 121 212*     Results from last 7 days   Lab Units 02/07/23  1530   INR  1 32*     Results from last 7 days   Lab Units 02/13/23  0746 02/12/23  2117 02/12/23  1112 02/12/23  0810 02/11/23  2108 02/11/23  1612 02/11/23  1115 02/11/23  0713 02/10/23  2253 02/10/23  2040 02/10/23  1608 02/10/23  1149   POC GLUCOSE mg/dl 121 96 303* 125 144* 109 165* 123 146* 158* 127 158*         Results from last 7 days   Lab Units 02/13/23  0505 02/11/23  0512 02/10/23  0955 02/08/23  0337 02/07/23  1759 02/07/23  1530   LACTIC ACID mmol/L  --   --  0 7  --  1 2 2 4*   PROCALCITONIN ng/ml 0 75* 0 61*  --  1 37*  --  1 07*       Lines/Drains:  Invasive Devices     Central Venous Catheter Line  Duration           Port A Cath 03/10/22 Right Chest 339 days          Peripheral Intravenous Line  Duration           Peripheral IV 02/07/23 Left;Proximal;Ventral (anterior) Forearm 5 days          Drain  Duration           Ileostomy LUQ 87 days    Cholecystostomy Tube 62 days    Abscess Drain Abdomen 31 days                Central Line:  Goal for removal: N/A - Chronic PICC             Imaging: No pertinent imaging reviewed  Recent Cultures (last 7 days):   Results from last 7 days   Lab Units 02/07/23  1759 02/07/23  1530   BLOOD CULTURE   --  No Growth After 5 Days  No Growth After 5 Days     GRAM STAIN RESULT  3+ Gram positive rods*  1+ Gram positive cocci in chains*  Rare Disintegrating polys*  --    WOUND CULTURE  3+ Growth of Escherichia coli ESBL*  3+ Growth of Methicillin Resistant Staphylococcus aureus*  3+ Growth of  --        Last 24 Hours Medication List:   Current Facility-Administered Medications   Medication Dose Route Frequency Provider Last Rate   • acetaminophen  650 mg Oral Q4H PRN Tayo Toro PA-C     • amLODIPine  2 5 mg Oral Daily Tayo Toro PA-C     • aspirin  81 mg Oral Daily Taty [place], [place]     • atorvastatin  40 mg Oral Daily Taty [place], [place]     • collagenase   Topical Daily Annabellereyna Lopez PA-C     • DULoxetine  30 mg Oral Daily Taty [place], [place]     • ertapenem  1,000 mg Intravenous Q24H Peace Monge MD 1,000 mg (02/12/23 1029)   • heparin (porcine)  5,000 Units Subcutaneous [facility] Taty [facility] NANETTE sanchez     • HYDROmorphone  0 2 mg Intravenous Q4H PRN Nelia Ambrosio DO     • influenza vaccine  0 5 mL Intramuscular Prior to discharge Sherice Grubbs MD     • insulin glargine  5 Units Subcutaneous HS Kiara Wang PA-C     • insulin lispro  1-6 Units Subcutaneous TID AC Sharon sanchez PA-C     • insulin lispro  1-6 Units Subcutaneous HS Princess [place], NANETTE     • insulin lispro  4 Units Subcutaneous TID With Meals Addis  lAf De Leon PA-C     • melatonin  6 mg Oral HS PRN Marychuy Perez PA-C     • methocarbamol  500 mg Oral BID PRN Addis  Alf De [place] PA-C     • multi-electrolyte  75 mL/hr Intravenous Continuous Nelia [facility] Saraiya, DO     • ondansetron  4 mg Intravenous Q4H PRN Nelia [facility] Rohanaiya, DO     • oxyCODONE  5 mg Oral Q4H PRN [facility], [facility]     • oxyCODONE  2 5 mg Oral Q4H PRN Nelia [facility] Rohanaiya, DO     • pantoprazole  40 mg Oral BID Sharon Vazquez PA-C     • pneumococcal 13-valent conjugate vaccine  0 5 mL Intramuscular Prior to discharge Sherice Grubbs MD     • simethicone  80 mg Oral 4x Daily (with meals and at bedtime) Princess [place] PA-C     • sodium chloride (PF)  10 mL Intravenous Daily [facility] Keesh[facility], DO     • tamsulosin  0 4 mg Oral Daily [facility] [address] [facility]     • vancomycin  1,000 mg Intravenous Q24H Nelia Demarco DO          Today, Patient Was Seen By: Ian Chadwick DO    **Please Note: This note may have been constructed using a voice recognition system  **

## 2023-02-13 NOTE — ASSESSMENT & PLAN NOTE
Patient with extensive abdominal surgical history, reports was recently discharged from rehab after prolonged hospitalization several weeks ago  Since then he has had increasing fatigue and generalized weakness, which has been particularly worse over the past several days  Over this timeframe he is also noted the development of foul-smelling drainage and increasing pain from his chronic abdominal wound  Otherwise he reports some mild SOB, and is without other symptoms/complaint at this time    · As evidenced by tachycardia, tachypnea, and leukocytosis  · In setting of purulent abdominal infection  · Procal downtrending  · Lactic intially 2 4; cleared after IVF in ED   · Hx of colon CA w/ extensive abd surgical hx as below   · Patient has several abdominal drains in place, with CT showing improvement in the fluid collections at the left liver lobe, perisplenic area, and the left retroperitoneum inferior to the spleen  · Started on IV meropenem per ID, transitioned to IV ertapenem and IV vancomycin based on wound cultures- now growing ESBL and MRSA  · Trend WBC; follow-up with pending infectious labs and cultures  · ID consulted; recommendations appreciated   · S/p bedside midline wound debridement + wet dressing 2/8 with general surgery

## 2023-02-13 NOTE — ASSESSMENT & PLAN NOTE
Lab Results   Component Value Date    EGFR 63 02/13/2023    EGFR 75 02/12/2023    EGFR 96 02/11/2023    CREATININE 1 25 02/13/2023    CREATININE 1 08 02/12/2023    CREATININE 0 84 02/11/2023     · Creatinine 2 5 to; baseline 1 0, improved   · Suspecting possible multifactorial cause in setting of dehydration and sepsis  · Avoid nephrotoxic agents  · Continue with IV fluids  · AM BMP

## 2023-02-13 NOTE — PROGRESS NOTES
Satish Qiu is a 61 y o  male who is currently ordered Vancomycin IV with management by the Pharmacy Consult service  Relevant clinical data and objective / subjective history reviewed  Vancomycin Assessment:  Indication and Goal AUC/Trough: Other, Intra-abdominal sepsis with possible wound infection, -600, trough >10  Clinical Status: stable  Micro:   No new result  Renal Function:  SCr: 1 25 mg/dL  CrCl: 71 7 mL/min  Renal replacement: Not on dialysis  Days of Therapy: 4  Current Dose: 1250mg IV q24h  Vancomycin Plan:  New Dosinmg IV q24h  Estimated AUC: 460 mcg*hr/mL  Estimated Trough: 14 1 mcg/mL  Next Level: 23 with AM labs  Renal Function Monitoring: Daily BMP and [place] will continue to follow closely for s/sx of nephrotoxicity, infusion reactions and appropriateness of therapy  BMP and CBC will be ordered per protocol  We will continue to follow the patient’s culture results and clinical progress daily      Ej Hargrove, Pharmacist

## 2023-02-13 NOTE — PLAN OF CARE
Problem: PAIN - ADULT  Goal: Verbalizes/displays adequate comfort level or baseline comfort level  Description: Interventions:  - Encourage patient to monitor pain and request assistance  - Assess pain using appropriate pain scale  - Administer analgesics based on type and severity of pain and evaluate response  - Implement non-pharmacological measures as appropriate and evaluate response  - Consider cultural and social influences on pain and pain management  - Notify physician/advanced practitioner if interventions unsuccessful or patient reports new pain  Outcome: Progressing     Problem: INFECTION - ADULT  Goal: Absence or prevention of progression during hospitalization  Description: INTERVENTIONS:  - Assess and monitor for signs and symptoms of infection  - Monitor lab/diagnostic results  - Monitor all insertion sites, i e  indwelling lines, tubes, and drains  - Monitor endotracheal if appropriate and nasal secretions for changes in amount and color  - [place] appropriate cooling/warming therapies per order  - Administer medications as ordered  - Instruct and encourage patient and family to use good hand hygiene technique  - Identify and instruct in appropriate isolation precautions for identified infection/condition  Outcome: Progressing  Goal: Absence of fever/infection during neutropenic period  Description: INTERVENTIONS:  - Monitor WBC    Outcome: Progressing     Problem: SAFETY ADULT  Goal: Patient will remain free of falls  Description: INTERVENTIONS:  - Educate patient/family on patient safety including physical limitations  - Instruct patient to call for assistance with activity   - Consult OT/PT to assist with strengthening/mobility   - Keep Call bell within reach  - Keep bed low and locked with side rails adjusted as appropriate  - Keep care items and personal belongings within reach  - Initiate and maintain comfort rounds  - Make Fall Risk Sign visible to staff  - Offer Toileting every 2 Hours, in advance of need  - Initiate/Maintain bed alarm  - Obtain necessary fall risk management equipment:   - Apply yellow socks and bracelet for high fall risk patients  - Consider moving patient to room near nurses station  Outcome: Progressing  Goal: Maintain or return to baseline ADL function  Description: INTERVENTIONS:  -  Assess patient's ability to carry out ADLs; assess patient's baseline for ADL function and identify physical deficits which impact ability to perform ADLs (bathing, care of mouth/teeth, toileting, grooming, dressing, etc )  - Assess/evaluate cause of self-care deficits   - Assess range of motion  - Assess patient's mobility; develop plan if impaired  - Assess patient's need for assistive devices and provide as appropriate  - Encourage maximum independence but intervene and supervise when necessary  - Involve family in performance of ADLs  - Assess for home care needs following discharge   - Consider OT consult to assist with ADL evaluation and planning for discharge  - Provide patient education as appropriate  Outcome: Progressing  Goal: Maintains/Returns to pre admission functional level  Description: INTERVENTIONS:  - Perform BMAT or MOVE assessment daily    - Set and communicate daily mobility goal to care team and patient/family/caregiver  - Collaborate with rehabilitation services on mobility goals if consulted  - Perform Range of Motion 3 times a day  - Reposition patient every 2 hours    - Dangle patient 3 times a day  - Stand patient 3 times a day  - Ambulate patient 3 times a day  - Out of bed to chair 3 times a day   - Out of bed for meals 3 times a day  - Out of bed for toileting  - Record patient progress and toleration of activity level   Outcome: Progressing     Problem: DISCHARGE PLANNING  Goal: Discharge to home or other facility with appropriate resources  Description: INTERVENTIONS:  - Identify barriers to discharge w/patient and caregiver  - Arrange for needed discharge resources and transportation as appropriate  - Identify discharge learning needs (meds, wound care, etc )  - Arrange for interpretive services to assist at discharge as needed  - Refer to Case Management Department for coordinating discharge planning if the patient needs post-hospital services based on physician/advanced practitioner order or complex needs related to functional status, cognitive ability, or social support system  Outcome: Progressing     Problem: Knowledge Deficit  Goal: Patient/family/caregiver demonstrates understanding of disease process, treatment plan, medications, and discharge instructions  Description: Complete learning assessment and assess knowledge base    Interventions:  - Provide teaching at level of understanding  - Provide teaching via preferred learning methods  Outcome: Progressing     Problem: SKIN/TISSUE INTEGRITY - ADULT  Goal: Skin Integrity remains intact(Skin Breakdown Prevention)  Description: Assess:  -Perform Michael assessment every   -Clean and moisturize skin every   -Inspect skin when repositioning, toileting, and assisting with ADLS  -Assess under medical devices such as  every   -Assess extremities for adequate circulation and sensation     Bed Management:  -Have minimal linens on bed & keep smooth, unwrinkled  -Change linens as needed when moist or perspiring  -Avoid sitting or lying in one position for more than  hours while in bed  -Keep HOB at degrees     Toileting:  -Offer bedside commode  -Assess for incontinence every   -Use incontinent care products after each incontinent episode such as     Activity:  -Mobilize patient  times a day  -Encourage activity and walks on unit  -Encourage or provide ROM exercises   -Turn and reposition patient every  Hours  -Use appropriate equipment to lift or move patient in bed  -Instruct/ Assist with weight shifting every  when out of bed in chair  -Consider limitation of chair time  hour intervals    Skin Care:  -Avoid use of baby powder, tape, friction and shearing, hot water or constrictive clothing  -Relieve pressure over bony prominences using   -Do not massage red bony areas    Next Steps:  -Teach patient strategies to minimize risks such as    -Consider consults to  interdisciplinary teams such as   Outcome: Progressing  Goal: Incision(s), wounds(s) or drain site(s) healing without S/S of infection  Description: INTERVENTIONS  - Assess and document dressing, incision, wound bed, drain sites and surrounding tissue  - Provide patient and family education  - Perform skin care/dressing changes every   Outcome: Progressing  Goal: Pressure injury heals and does not worsen  Description: Interventions:  - Implement low air loss mattress or specialty surface (Criteria met)  - Apply silicone foam dressing  - Instruct/assist with weight shifting every  minutes when in chair   - Limit chair time to  hour intervals  - Use special pressure reducing interventions such as  when in chair   - Apply fecal or urinary incontinence containment device   - Perform passive or active ROM every   - Turn and reposition patient & offload bony prominences every  hours   - Utilize friction reducing device or surface for transfers   - Consider consults to  interdisciplinary teams such as   - Use incontinent care products after each incontinent episode such as   - Consider nutrition services referral as needed  Outcome: Progressing     Problem: Prexisting or High Potential for Compromised Skin Integrity  Goal: Skin integrity is maintained or improved  Description: INTERVENTIONS:  - Identify patients at risk for skin breakdown  - Assess and monitor skin integrity  - Assess and monitor nutrition and hydration status  - Monitor labs   - Assess for incontinence   - Turn and reposition patient  - Assist with mobility/ambulation  - Relieve pressure over bony prominences  - Avoid friction and shearing  - Provide appropriate hygiene as needed including keeping skin clean and dry  - Evaluate need for skin moisturizer/barrier cream  - Collaborate with interdisciplinary team   - Patient/family teaching  - Consider wound care consult   Outcome: Progressing     Problem: MOBILITY - ADULT  Goal: Maintain or return to baseline ADL function  Description: INTERVENTIONS:  -  Assess patient's ability to carry out ADLs; assess patient's baseline for ADL function and identify physical deficits which impact ability to perform ADLs (bathing, care of mouth/teeth, toileting, grooming, dressing, etc )  - Assess/evaluate cause of self-care deficits   - Assess range of motion  - Assess patient's mobility; develop plan if impaired  - Assess patient's need for assistive devices and provide as appropriate  - Encourage maximum independence but intervene and supervise when necessary  - Involve family in performance of ADLs  - Assess for home care needs following discharge   - Consider OT consult to assist with ADL evaluation and planning for discharge  - Provide patient education as appropriate  Outcome: Progressing  Goal: Maintains/Returns to pre admission functional level  Description: INTERVENTIONS:  - Perform BMAT or MOVE assessment daily    - Set and communicate daily mobility goal to care team and patient/family/caregiver  - Collaborate with rehabilitation services on mobility goals if consulted  - Perform Range of Motion 3 times a day  - Reposition patient every 2 hours    - Dangle patient 3 times a day  - Stand patient 3 times a day  - Ambulate patient 3 times a day  - Out of bed to chair 3 times a day   - Out of bed for meals 3 times a day  - Out of bed for toileting  - Record patient progress and toleration of activity level   Outcome: Progressing

## 2023-02-13 NOTE — CASE MANAGEMENT
Case Management Discharge Planning Note    Patient name Karlee Moreau  Location /-91 MRN [id number]  : 1965 Date 2023       Current Admission Date: 2023  Current Admission Diagnosis:Sepsis [facility])   Patient Active Problem List    Diagnosis Date Noted   • Nephrolithiasis 2023   • Abnormal CT scan 2023   • Hyperkalemia 2023   • Hyponatremia 2023   • Dehiscence of incision 2022   • Elevated alkaline phosphatase level 2022   • Leukocytosis 2022   • Abscess 2022   • Acute pain 2022   • Sepsis ([facility] [address] ) 2022   • Severe protein-calorie malnutrition ([facility] [address] ) 2022   • Acute on chronic kidney failure ([facility] [address] ) 2022   • MR (mitral regurgitation) 2022   • ESBL (extended spectrum beta-lactamase) producing bacteria infection 2022   • Encephalopathy 2022   • Cervical radiculopathy 10/26/2022   • Other fatigue 06/15/2022   • Colostomy prolapse ([facility] [address] ) 2022   • Colon cancer metastasized to liver ([facility] [address] ) 2022   • 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 liver ([facility] [address] ) 2022   • Iron deficiency anemia, unspecified 2022   • Malignant neoplasm of transverse colon ([facility] [address] ) 2022   • Thrombocytosis 2022   • Hypokalemia 2022   • Transaminitis 2022   • Type 2 diabetes mellitus with hyperlipidemia ([address] ) 2022   • Microcytic anemia 2022   • Left ureteral calculus 2020   • Incarcerated umbilical hernia    • Testicular hypogonadism 2017   • Low testosterone 2017   • Type 2 diabetes mellitus without complication, with long-term current use of insulin ([facility] [address] ) 2016   • Benign essential hypertension 2016   • Mixed hyperlipidemia 2016   • Erectile dysfunction 2016   • Obesity (BMI 30-39 9) 2016      LOS (days): 6  Geometric Mean LOS (GMLOS) (days):   Days to GMLOS:     OBJECTIVE:  Risk of Unplanned Readmission Score: 43 34      Current admission status: Inpatient   Preferred Pharmacy:   [facility] - [address] [facility] [place] [address]  Phone: [phone] Fax: [address] [facility] [address] [address] [address]  Phone: [phone] Fax: [address] [place] [address]  Phone: [phone] Fax: [phone]    Primary Care Provider: Boom Patel MD    Primary Insurance: [employer]  Secondary Insurance:     DISCHARGE DETAILS:  Patient reviewed during Interdisciplinary Rounds with [facility] today  Anticipated d/c in 24hrs  Rehab rec for STR  [facility] referral placed today  CM will follow to obtain patient/family preference

## 2023-02-13 NOTE — ASSESSMENT & PLAN NOTE
Follows w/ outpatient [facility] onc and palliative care   Extensive abdominal surgical history (11/7 ex lap w/ section 3 liver resection, and development of left upper quadrant hematoma and suspected leak from transverse colon anastomosis complication; 54/02 right hemicolectomy; 11/17 re-exploration w/partial descending colectomy; 12/8 IR percutaneous cholecystostomy tube and hepatectomy abscess drainage; 12/20 IR drains placed for perisplenic abscess)  · Patient does not wish to follow with [place] oncology anymore as it is too far of a drive; would like to establish with [facility] oncology (already follows with Dr Mónica Gill)  · Continue outpatient f/u

## 2023-02-13 NOTE — ASSESSMENT & PLAN NOTE
· Sodium 129 on ED presentation, improving 133  · Suspected to be in the setting of dehydration  · AM BMP recheck

## 2023-02-13 NOTE — PLAN OF CARE
Problem: PAIN - ADULT  Goal: Verbalizes/displays adequate comfort level or baseline comfort level  Description: Interventions:  - Encourage patient to monitor pain and request assistance  - Assess pain using appropriate pain scale  - Administer analgesics based on type and severity of pain and evaluate response  - Implement non-pharmacological measures as appropriate and evaluate response  - Consider cultural and social influences on pain and pain management  - Notify physician/advanced practitioner if interventions unsuccessful or patient reports new pain  Outcome: Progressing     Problem: INFECTION - ADULT  Goal: Absence or prevention of progression during hospitalization  Description: INTERVENTIONS:  - Assess and monitor for signs and symptoms of infection  - Monitor lab/diagnostic results  - Monitor all insertion sites, i e  indwelling lines, tubes, and drains  - Monitor endotracheal if appropriate and nasal secretions for changes in amount and color  - [place] appropriate cooling/warming therapies per order  - Administer medications as ordered  - Instruct and encourage patient and family to use good hand hygiene technique  - Identify and instruct in appropriate isolation precautions for identified infection/condition  Outcome: Progressing  Goal: Absence of fever/infection during neutropenic period  Description: INTERVENTIONS:  - Monitor WBC    Outcome: Progressing     Problem: SAFETY ADULT  Goal: Patient will remain free of falls  Description: INTERVENTIONS:  - Educate patient/family on patient safety including physical limitations  - Instruct patient to call for assistance with activity   - Consult OT/PT to assist with strengthening/mobility   - Keep Call bell within reach  - Keep bed low and locked with side rails adjusted as appropriate  - Keep care items and personal belongings within reach  - Initiate and maintain comfort rounds  - Make Fall Risk Sign visible to staff  - Offer Toileting every 2 Hours, in advance of need  - Initiate/Maintain BED alarm  - Obtain necessary fall risk management equipment  - Apply yellow socks and bracelet for high fall risk patients  - Consider moving patient to room near nurses station  Outcome: Progressing  Goal: Maintain or return to baseline ADL function  Description: INTERVENTIONS:  -  Assess patient's ability to carry out ADLs; assess patient's baseline for ADL function and identify physical deficits which impact ability to perform ADLs (bathing, care of mouth/teeth, toileting, grooming, dressing, etc )  - Assess/evaluate cause of self-care deficits   - Assess range of motion  - Assess patient's mobility; develop plan if impaired  - Assess patient's need for assistive devices and provide as appropriate  - Encourage maximum independence but intervene and supervise when necessary  - Involve family in performance of ADLs  - Assess for home care needs following discharge   - Consider OT consult to assist with ADL evaluation and planning for discharge  - Provide patient education as appropriate  Outcome: Progressing  Goal: Maintains/Returns to pre admission functional level  Description: INTERVENTIONS:  - Perform BMAT or MOVE assessment daily    - Set and communicate daily mobility goal to care team and patient/family/caregiver  - Collaborate with rehabilitation services on mobility goals if consulted  - Perform Range of Motion 3 times a day  - Reposition patient every 2 hours    - Dangle patient 3 times a day  - Stand patient 3 times a day  - Ambulate patient 3 times a day  - Out of bed to chair 3 times a day   - Out of bed for meals 3 times a day  - Out of bed for toileting  - Record patient progress and toleration of activity level   Outcome: Progressing     Problem: DISCHARGE PLANNING  Goal: Discharge to home or other facility with appropriate resources  Description: INTERVENTIONS:  - Identify barriers to discharge w/patient and caregiver  - Arrange for needed discharge resources and transportation as appropriate  - Identify discharge learning needs (meds, wound care, etc )  - Arrange for interpretive services to assist at discharge as needed  - Refer to Case Management Department for coordinating discharge planning if the patient needs post-hospital services based on physician/advanced practitioner order or complex needs related to functional status, cognitive ability, or social support system  Outcome: Progressing     Problem: Knowledge Deficit  Goal: Patient/family/caregiver demonstrates understanding of disease process, treatment plan, medications, and discharge instructions  Description: Complete learning assessment and assess knowledge base    Interventions:  - Provide teaching at level of understanding  - Provide teaching via preferred learning methods  Outcome: Progressing     Problem: SKIN/TISSUE INTEGRITY - ADULT  Goal: Skin Integrity remains intact(Skin Breakdown Prevention)  Description: Assess:  -Perform Michael assessment  -Clean and moisturize skin  -Inspect skin when repositioning, toileting, and assisting with ADLS  -Assess under medical devices  -Assess extremities for adequate circulation and sensation     Bed Management:  -Have minimal linens on bed & keep smooth, unwrinkled  -Change linens as needed when moist or perspiring  -Avoid sitting or lying in one position for more than 2 hours while in bed  -Keep HOB at 30 degrees     Toileting:  -Offer bedside commode  -Assess for incontinence   -Use incontinent care products after each incontinent episode     Activity:  -Mobilize patient 3 times a day  -Encourage activity and walks on unit  -Encourage or provide ROM exercises   -Turn and reposition patient every 2 Hours  -Use appropriate equipment to lift or move patient in bed  -Instruct/ Assist with weight shifting every 2 when out of bed in chair  -Consider limitation of chair time 2 hour intervals    Skin Care:  -Avoid use of baby powder, tape, friction and shearing, hot water or constrictive clothing  -Relieve pressure over bony prominences   -Do not massage red bony areas    Next Steps:  -Teach patient strategies to minimize risks    -Consider consults to  interdisciplinary teams   Outcome: Progressing  Goal: Incision(s), wounds(s) or drain site(s) healing without S/S of infection  Description: INTERVENTIONS  - Assess and document dressing, incision, wound bed, drain sites and surrounding tissue  - Provide patient and family education  - Perform skin care/dressing changes   Outcome: Progressing  Goal: Pressure injury heals and does not worsen  Description: Interventions:  - Implement low air loss mattress or specialty surface (Criteria met)  - Apply silicone foam dressing  - Instruct/assist with weight shifting every 120 minutes when in chair   - Limit chair time to 2 hour intervals  - Use special pressure reducing interventions when in chair   - Apply fecal or urinary incontinence containment device   - Perform passive or active ROM   - Turn and reposition patient & offload bony prominences   - Consider consults to  interdisciplinary teams  - Use incontinent care products after each incontinent episode   - Consider nutrition services referral as needed  Outcome: Progressing     Problem: Prexisting or High Potential for Compromised Skin Integrity  Goal: Skin integrity is maintained or improved  Description: INTERVENTIONS:  - Identify patients at risk for skin breakdown  - Assess and monitor skin integrity  - Assess and monitor nutrition and hydration status  - Monitor labs   - Assess for incontinence   - Turn and reposition patient  - Assist with mobility/ambulation  - Relieve pressure over bony prominences  - Avoid friction and shearing  - Provide appropriate hygiene as needed including keeping skin clean and dry  - Evaluate need for skin moisturizer/barrier cream  - Collaborate with interdisciplinary team   - Patient/family teaching  - Consider wound care consult Outcome: Progressing     Problem: MOBILITY - ADULT  Goal: Maintain or return to baseline ADL function  Description: INTERVENTIONS:  -  Assess patient's ability to carry out ADLs; assess patient's baseline for ADL function and identify physical deficits which impact ability to perform ADLs (bathing, care of mouth/teeth, toileting, grooming, dressing, etc )  - Assess/evaluate cause of self-care deficits   - Assess range of motion  - Assess patient's mobility; develop plan if impaired  - Assess patient's need for assistive devices and provide as appropriate  - Encourage maximum independence but intervene and supervise when necessary  - Involve family in performance of ADLs  - Assess for home care needs following discharge   - Consider OT consult to assist with ADL evaluation and planning for discharge  - Provide patient education as appropriate  Outcome: Progressing  Goal: Maintains/Returns to pre admission functional level  Description: INTERVENTIONS:  - Perform BMAT or MOVE assessment daily    - Set and communicate daily mobility goal to care team and patient/family/caregiver  - Collaborate with rehabilitation services on mobility goals if consulted  - Perform Range of Motion 3 times a day  - Reposition patient every 2 hours    - Dangle patient 3 times a day  - Stand patient 3 times a day  - Ambulate patient 3 times a day  - Out of bed to chair 3 times a day   - Out of bed for meals 3 times a day  - Out of bed for toileting  - Record patient progress and toleration of activity level   Outcome: Progressing

## 2023-02-14 LAB
ANION GAP SERPL CALCULATED.3IONS-SCNC: 10 MMOL/L (ref 4–13)
BUN SERPL-MCNC: 21 MG/DL (ref 5–25)
CALCIUM SERPL-MCNC: 7.7 MG/DL (ref 8.3–10.1)
CHLORIDE SERPL-SCNC: 99 MMOL/L (ref 96–108)
CO2 SERPL-SCNC: 23 MMOL/L (ref 21–32)
CREAT SERPL-MCNC: 1.41 MG/DL (ref 0.6–1.3)
ERYTHROCYTE [DISTWIDTH] IN BLOOD BY AUTOMATED COUNT: 17.3 % (ref 11.6–15.1)
GFR SERPL CREATININE-BSD FRML MDRD: 54 ML/MIN/1.73SQ M
GLUCOSE SERPL-MCNC: 101 MG/DL (ref 65–140)
GLUCOSE SERPL-MCNC: 110 MG/DL (ref 65–140)
GLUCOSE SERPL-MCNC: 121 MG/DL (ref 65–140)
GLUCOSE SERPL-MCNC: 157 MG/DL (ref 65–140)
GLUCOSE SERPL-MCNC: 164 MG/DL (ref 65–140)
HCT VFR BLD AUTO: 23.2 % (ref 36.5–49.3)
HGB BLD-MCNC: 7.1 G/DL (ref 12–17)
MCH RBC QN AUTO: 25.6 PG (ref 26.8–34.3)
MCHC RBC AUTO-ENTMCNC: 30.6 G/DL (ref 31.4–37.4)
MCV RBC AUTO: 84 FL (ref 82–98)
PLATELET # BLD AUTO: 325 THOUSANDS/UL (ref 149–390)
PMV BLD AUTO: 10 FL (ref 8.9–12.7)
POTASSIUM SERPL-SCNC: 3.9 MMOL/L (ref 3.5–5.3)
RBC # BLD AUTO: 2.77 MILLION/UL (ref 3.88–5.62)
SODIUM SERPL-SCNC: 132 MMOL/L (ref 135–147)
WBC # BLD AUTO: 11.12 THOUSAND/UL (ref 4.31–10.16)

## 2023-02-14 RX ORDER — SODIUM CHLORIDE 9 MG/ML
75 INJECTION, SOLUTION INTRAVENOUS CONTINUOUS
Status: DISCONTINUED | OUTPATIENT
Start: 2023-02-14 | End: 2023-02-20

## 2023-02-14 RX ORDER — SODIUM CHLORIDE, SODIUM GLUCONATE, SODIUM ACETATE, POTASSIUM CHLORIDE, MAGNESIUM CHLORIDE, SODIUM PHOSPHATE, DIBASIC, AND POTASSIUM PHOSPHATE .53; .5; .37; .037; .03; .012; .00082 G/100ML; G/100ML; G/100ML; G/100ML; G/100ML; G/100ML; G/100ML
75 INJECTION, SOLUTION INTRAVENOUS CONTINUOUS
Status: DISCONTINUED | OUTPATIENT
Start: 2023-02-14 | End: 2023-02-14

## 2023-02-14 RX ADMIN — INSULIN LISPRO 1 UNITS: 100 INJECTION, SOLUTION INTRAVENOUS; SUBCUTANEOUS at 17:00

## 2023-02-14 RX ADMIN — SIMETHICONE 80 MG: 80 TABLET, CHEWABLE ORAL at 08:59

## 2023-02-14 RX ADMIN — INSULIN LISPRO 4 UNITS: 100 INJECTION, SOLUTION INTRAVENOUS; SUBCUTANEOUS at 17:30

## 2023-02-14 RX ADMIN — DULOXETINE HYDROCHLORIDE 30 MG: 30 CAPSULE, DELAYED RELEASE ORAL at 08:59

## 2023-02-14 RX ADMIN — SIMETHICONE 80 MG: 80 TABLET, CHEWABLE ORAL at 21:22

## 2023-02-14 RX ADMIN — SIMETHICONE 80 MG: 80 TABLET, CHEWABLE ORAL at 12:18

## 2023-02-14 RX ADMIN — INSULIN LISPRO 1 UNITS: 100 INJECTION, SOLUTION INTRAVENOUS; SUBCUTANEOUS at 12:18

## 2023-02-14 RX ADMIN — ASPIRIN 81 MG 81 MG: 81 TABLET ORAL at 08:59

## 2023-02-14 RX ADMIN — COLLAGENASE SANTYL 1 APPLICATION.: 250 OINTMENT TOPICAL at 08:58

## 2023-02-14 RX ADMIN — HEPARIN SODIUM 5000 UNITS: 5000 INJECTION INTRAVENOUS; SUBCUTANEOUS at 21:22

## 2023-02-14 RX ADMIN — PANTOPRAZOLE SODIUM 40 MG: 40 TABLET, DELAYED RELEASE ORAL at 17:59

## 2023-02-14 RX ADMIN — ERTAPENEM SODIUM 1000 MG: 1 INJECTION, POWDER, LYOPHILIZED, FOR SOLUTION INTRAMUSCULAR; INTRAVENOUS at 08:59

## 2023-02-14 RX ADMIN — SODIUM CHLORIDE, PRESERVATIVE FREE 10 ML: 5 INJECTION INTRAVENOUS at 08:59

## 2023-02-14 RX ADMIN — TAMSULOSIN HYDROCHLORIDE 0.4 MG: 0.4 CAPSULE ORAL at 17:00

## 2023-02-14 RX ADMIN — HEPARIN SODIUM 5000 UNITS: 5000 INJECTION INTRAVENOUS; SUBCUTANEOUS at 05:24

## 2023-02-14 RX ADMIN — SODIUM CHLORIDE 75 ML/HR: 0.9 INJECTION, SOLUTION INTRAVENOUS at 22:57

## 2023-02-14 RX ADMIN — INSULIN LISPRO 4 UNITS: 100 INJECTION, SOLUTION INTRAVENOUS; SUBCUTANEOUS at 12:18

## 2023-02-14 RX ADMIN — ATORVASTATIN CALCIUM 40 MG: 40 TABLET, FILM COATED ORAL at 08:59

## 2023-02-14 RX ADMIN — OXYCODONE HYDROCHLORIDE 5 MG: 5 TABLET ORAL at 22:15

## 2023-02-14 RX ADMIN — HYDROMORPHONE HYDROCHLORIDE 0.2 MG: 0.2 INJECTION, SOLUTION INTRAMUSCULAR; INTRAVENOUS; SUBCUTANEOUS at 22:54

## 2023-02-14 RX ADMIN — SIMETHICONE 80 MG: 80 TABLET, CHEWABLE ORAL at 17:30

## 2023-02-14 RX ADMIN — VANCOMYCIN HYDROCHLORIDE 1000 MG: 1 INJECTION, SOLUTION INTRAVENOUS at 21:24

## 2023-02-14 RX ADMIN — INSULIN GLARGINE 5 UNITS: 100 INJECTION, SOLUTION SUBCUTANEOUS at 21:22

## 2023-02-14 RX ADMIN — SODIUM CHLORIDE 75 ML/HR: 0.9 INJECTION, SOLUTION INTRAVENOUS at 14:11

## 2023-02-14 RX ADMIN — PANTOPRAZOLE SODIUM 40 MG: 40 TABLET, DELAYED RELEASE ORAL at 08:59

## 2023-02-14 NOTE — PLAN OF CARE
Problem: PHYSICAL THERAPY ADULT  Goal: Performs mobility at highest level of function for planned discharge setting  See evaluation for individualized goals  Description: Treatment/Interventions: Functional transfer training, LE strengthening/ROM, Therapeutic exercise, Endurance training, Patient/family training, Bed mobility, Gait training, Spoke to nursing, OT, Family  Equipment Recommended: Alessandra Socks       See flowsheet documentation for full assessment, interventions and recommendations  Outcome: Progressing  Note: Prognosis: Good  Problem List: Decreased strength, Decreased endurance, Impaired balance, Decreased mobility  Assessment: Pt seen for PT treatment session this date, consisting of ther act focused on transfer training and gt training on level surfaces to improve pt safety in household environment  Since previous session, pt has made fair progress in terms of activity tolerance and assist required for mobility  This date, patient greeted supine in bed reporting 0/10 pain and agreed to PT session including out of bed transfer to chair  Patient required supervision for sup > sit with HOB elevated and use of bedrails; patient did report dizziness upon initially sitting up that subsided within ~3-4 minutes, BP monitored as documented in flow sheet  Patient required Lafrances Liner for STS to RW and progressed to ambulation 5' and SPT with RW minAx1 + SBA second person to manage lines  Further mobility deferred due to patient reporting overall fatigue and "I just feel weak " Pertinent barriers during this session include fatigue  Current goals and POC remain appropriate, pt continues to have rehab potential and is making good progress towards STGs   Pt prognosis for achieving goals is good, pending pt progress with hospitalization/medical status improvements, and indicated by orientation, ability to follow directions, motivation, supportive family/caregivers, previous response to intervention and responsive to cues/strategies  Pt limited d/t fear of falling and fatigue  PT recommends post acute rehabilitation services upon discharge  Pt continues to be functioning below baseline level, and remains limited 2* factors listed above  PT will continue to see pt during current hospitalization in order to address the deficits listed above and provide interventions consistent w/ POC in effort to achieve STGs  Barriers to Discharge: Inaccessible home environment     PT Discharge Recommendation: Post acute rehabilitation services    See flowsheet documentation for full assessment     Ori Dobbins; PT, DPT

## 2023-02-14 NOTE — ASSESSMENT & PLAN NOTE
Lab Results   Component Value Date    EGFR 54 02/14/2023    EGFR 63 02/13/2023    EGFR 75 02/12/2023    CREATININE 1 41 (H) 02/14/2023    CREATININE 1 25 02/13/2023    CREATININE 1 08 02/12/2023     · Creatinine 2 5 to; baseline 1 0, improved  However noted slight increase  · Suspecting possible multifactorial cause in setting of dehydration and sepsis   Also likely Vancomycin contributing  · Avoid nephrotoxic agents

## 2023-02-14 NOTE — PLAN OF CARE
Problem: PAIN - ADULT  Goal: Verbalizes/displays adequate comfort level or baseline comfort level  Description: Interventions:  - Encourage patient to monitor pain and request assistance  - Assess pain using appropriate pain scale  - Administer analgesics based on type and severity of pain and evaluate response  - Implement non-pharmacological measures as appropriate and evaluate response  - Consider cultural and social influences on pain and pain management  - Notify physician/advanced practitioner if interventions unsuccessful or patient reports new pain  Outcome: Progressing     Problem: INFECTION - ADULT  Goal: Absence or prevention of progression during hospitalization  Description: INTERVENTIONS:  - Assess and monitor for signs and symptoms of infection  - Monitor lab/diagnostic results  - Monitor all insertion sites, i e  indwelling lines, tubes, and drains  - Monitor endotracheal if appropriate and nasal secretions for changes in amount and color  - [place] appropriate cooling/warming therapies per order  - Administer medications as ordered  - Instruct and encourage patient and family to use good hand hygiene technique  - Identify and instruct in appropriate isolation precautions for identified infection/condition  Outcome: Progressing  Goal: Absence of fever/infection during neutropenic period  Description: INTERVENTIONS:  - Monitor WBC    Outcome: Progressing     Problem: SAFETY ADULT  Goal: Patient will remain free of falls  Description: INTERVENTIONS:  - Educate patient/family on patient safety including physical limitations  - Instruct patient to call for assistance with activity   - Consult OT/PT to assist with strengthening/mobility   - Keep Call bell within reach  - Keep bed low and locked with side rails adjusted as appropriate  - Keep care items and personal belongings within reach  - Initiate and maintain comfort rounds  - Make Fall Risk Sign visible to staff  - Offer Toileting every 2 Hours, in advance of need  - Initiate/Maintain bed alarm  - Obtain necessary fall risk management equipment  - Apply yellow socks and bracelet for high fall risk patients  - Consider moving patient to room near nurses station  Outcome: Progressing  Goal: Maintain or return to baseline ADL function  Description: INTERVENTIONS:  -  Assess patient's ability to carry out ADLs; assess patient's baseline for ADL function and identify physical deficits which impact ability to perform ADLs (bathing, care of mouth/teeth, toileting, grooming, dressing, etc )  - Assess/evaluate cause of self-care deficits   - Assess range of motion  - Assess patient's mobility; develop plan if impaired  - Assess patient's need for assistive devices and provide as appropriate  - Encourage maximum independence but intervene and supervise when necessary  - Involve family in performance of ADLs  - Assess for home care needs following discharge   - Consider OT consult to assist with ADL evaluation and planning for discharge  - Provide patient education as appropriate  Outcome: Progressing  Goal: Maintains/Returns to pre admission functional level  Description: INTERVENTIONS:  - Perform BMAT or MOVE assessment daily    - Set and communicate daily mobility goal to care team and patient/family/caregiver  - Collaborate with rehabilitation services on mobility goals if consulted  - Perform Range of Motion 3 times a day  - Reposition patient every 2 hours    - Dangle patient 3 times a day  - Stand patient 3 times a day  - Ambulate patient 3 times a day  - Out of bed to chair 3 times a day   - Out of bed for meals 3 times a day  - Out of bed for toileting  - Record patient progress and toleration of activity level   Outcome: Progressing     Problem: DISCHARGE PLANNING  Goal: Discharge to home or other facility with appropriate resources  Description: INTERVENTIONS:  - Identify barriers to discharge w/patient and caregiver  - Arrange for needed discharge resources and transportation as appropriate  - Identify discharge learning needs (meds, wound care, etc )  - Arrange for interpretive services to assist at discharge as needed  - Refer to Case Management Department for coordinating discharge planning if the patient needs post-hospital services based on physician/advanced practitioner order or complex needs related to functional status, cognitive ability, or social support system  Outcome: Progressing     Problem: Knowledge Deficit  Goal: Patient/family/caregiver demonstrates understanding of disease process, treatment plan, medications, and discharge instructions  Description: Complete learning assessment and assess knowledge base    Interventions:  - Provide teaching at level of understanding  - Provide teaching via preferred learning methods  Outcome: Progressing     Problem: SKIN/TISSUE INTEGRITY - ADULT  Goal: Skin Integrity remains intact(Skin Breakdown Prevention)  Description: Assess:  -Perform Michael assessment   -Clean and moisturize skin   -Inspect skin when repositioning, toileting, and assisting with ADLS  -Assess under medical devices  -Assess extremities for adequate circulation and sensation     Bed Management:  -Have minimal linens on bed & keep smooth, unwrinkled  -Change linens as needed when moist or perspiring  -Avoid sitting or lying in one position for more than 2  hours while in bed  -Keep HOB at 30 degrees     Toileting:  -Offer bedside commode  -Assess for incontinence   -Use incontinent care products after each incontinent episode     Activity:  -Mobilize patient 3 times a day  -Encourage activity and walks on unit  -Encourage or provide ROM exercises   -Turn and reposition patient every 2 Hours  -Use appropriate equipment to lift or move patient in bed  -Instruct/ Assist with weight shifting  when out of bed in chair  -Consider limitation of chair time 2 hour intervals    Skin Care:  -Avoid use of baby powder, tape, friction and shearing, hot water or constrictive clothing  -Relieve pressure over bony prominences   -Do not massage red bony areas    Next Steps:  -Teach patient strategies to minimize risks    -Consider consults to  interdisciplinary teams   Outcome: Progressing  Goal: Incision(s), wounds(s) or drain site(s) healing without S/S of infection  Description: INTERVENTIONS  - Assess and document dressing, incision, wound bed, drain sites and surrounding tissue  - Provide patient and family education  - Perform skin care/dressing changes   Outcome: Progressing  Goal: Pressure injury heals and does not worsen  Description: Interventions:  - Implement low air loss mattress or specialty surface (Criteria met)  - Apply silicone foam dressing  - Instruct/assist with weight shifting every 120 minutes when in chair   - Limit chair time to 2 hour intervals  - Use special pressure reducing interventions when in chair   - Apply fecal or urinary incontinence containment device   - Perform passive or active ROM   - Turn and reposition patient & offload bony prominences   - Utilize friction reducing device or surface for transfers   - Consider consults to  interdisciplinary teams  - Use incontinent care products after each incontinent episode   - Consider nutrition services referral as needed  Outcome: Progressing     Problem: Prexisting or High Potential for Compromised Skin Integrity  Goal: Skin integrity is maintained or improved  Description: INTERVENTIONS:  - Identify patients at risk for skin breakdown  - Assess and monitor skin integrity  - Assess and monitor nutrition and hydration status  - Monitor labs   - Assess for incontinence   - Turn and reposition patient  - Assist with mobility/ambulation  - Relieve pressure over bony prominences  - Avoid friction and shearing  - Provide appropriate hygiene as needed including keeping skin clean and dry  - Evaluate need for skin moisturizer/barrier cream  - Collaborate with interdisciplinary team   - Patient/family teaching  - Consider wound care consult   Outcome: Progressing     Problem: MOBILITY - ADULT  Goal: Maintain or return to baseline ADL function  Description: INTERVENTIONS:  -  Assess patient's ability to carry out ADLs; assess patient's baseline for ADL function and identify physical deficits which impact ability to perform ADLs (bathing, care of mouth/teeth, toileting, grooming, dressing, etc )  - Assess/evaluate cause of self-care deficits   - Assess range of motion  - Assess patient's mobility; develop plan if impaired  - Assess patient's need for assistive devices and provide as appropriate  - Encourage maximum independence but intervene and supervise when necessary  - Involve family in performance of ADLs  - Assess for home care needs following discharge   - Consider OT consult to assist with ADL evaluation and planning for discharge  - Provide patient education as appropriate  Outcome: Progressing  Goal: Maintains/Returns to pre admission functional level  Description: INTERVENTIONS:  - Perform BMAT or MOVE assessment daily    - Set and communicate daily mobility goal to care team and patient/family/caregiver  - Collaborate with rehabilitation services on mobility goals if consulted  - Perform Range of Motion 3 times a day  - Reposition patient every 2 hours    - Dangle patient 3 times a day  - Stand patient 3 times a day  - Ambulate patient 3 times a day  - Out of bed to chair 3 times a day   - Out of bed for meals 3 times a day  - Out of bed for toileting  - Record patient progress and toleration of activity level   Outcome: Progressing

## 2023-02-14 NOTE — [facility] ADMISSION
Referral received for [facility]  Pending review with [facility] physician

## 2023-02-14 NOTE — PROGRESS NOTES
[facility]  Progress Note - Rudene Record 1965, 61 y o  male MRN: [id number]  Unit/Bed#: -Shawn Encounter: [id number]  Primary Care Provider: Enedina Pickard MD   Date and time admitted to hospital: 2/7/2023  3:01 PM    * Sepsis [facility])  Assessment & Plan  Noted the development of foul-smelling drainage and increasing pain from his chronic abdominal wound  · As evidenced by tachycardia, tachypnea, and leukocytosis  · In setting of purulent abdominal infection   · Hx of colon CA w/ extensive abd surgical hx as below   · Patient has several abdominal drains in place, with CT showing improvement in the fluid collections at the left liver lobe, perisplenic area, and the left retroperitoneum inferior to the spleen  · Completed IV Ertapenem, now on IV Vancomycin to complete through 2/16 based on wound cultures - now growing ESBL and MRSA  · Trend WBC; follow-up with pending infectious labs and cultures  · ID consulted; recommendations appreciated   · S/p bedside midline wound debridement + wet dressing 2/8 with general surgery      Nephrolithiasis  Assessment & Plan  RUQ US revealed 7 mm nonobstructing right intrarenal calculus  No hydronephrosis noted on CT a/p  Asymptomatic at this time     · Flomax  · Strain urine  · Intake and output    Hyponatremia  Assessment & Plan  · Sodium 129 on ED presentation, improving  · Suspected to be in the setting of dehydration/poor oral intake, likely SIADH in the setting of malignancy also contributing  · AM BMP recheck     Abnormal CT scan  Assessment & Plan  CT shows interval decrease in bilateral pleural effusions, however multiple small bilateral lung nodules are seen which are concerning for possible metastatic lung disease  · Patient reports having prior known lung nodules  · Oxygenating appropriately on room air; continue to monitor oxygenation  · Outpatient f/u    Acute on chronic kidney failure [facility])  Assessment & Plan  Lab Results   Component Value Date    EGFR 54 02/14/2023    EGFR 63 02/13/2023    EGFR 75 02/12/2023    CREATININE 1 41 (H) 02/14/2023    CREATININE 1 25 02/13/2023    CREATININE 1 08 02/12/2023     · Creatinine 2 5 to; baseline 1 0, improved  However noted slight increase  · Suspecting possible multifactorial cause in setting of dehydration and sepsis  Also likely Vancomycin contributing  · Avoid nephrotoxic agents    Colon cancer metastasized to liver [facility])  Assessment & Plan  Follows w/ outpatient [facility] onc and palliative care  Extensive abdominal surgical history (11/7 ex lap w/ section 3 liver resection, and development of left upper quadrant hematoma and suspected leak from transverse colon anastomosis complication; 45/36 right hemicolectomy; 11/17 re-exploration w/partial descending colectomy; 12/8 IR percutaneous cholecystostomy tube and hepatectomy abscess drainage; 12/20 IR drains placed for perisplenic abscess)  · Patient does not wish to follow with [place] oncology anymore as it is too far of a drive; would like to establish with [place] oncology (already follows with Dr Gordo Rangel)  · Continue outpatient f/u    Benign essential hypertension  Assessment & Plan  BP stable  · Continue prehospital HTN medications  · Monitor BP per unit protocol    Type 2 diabetes mellitus without complication, with long-term current use of insulin (HCC)  Assessment & Plan    Lab Results   Component Value Date    HGBA1C 8 0 (H) 09/26/2022     · Home Lantus 8U qHS and humalog 4U TID w/meals   · Dose reduce to 5U qhs + 4 U TID   · 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  · Hypoglycemia protocol  · Diabetic diet       VTE Pharmacologic Prophylaxis: VTE Score: 6 High Risk (Score >/= 5) - Pharmacological DVT Prophylaxis Ordered: heparin  Sequential Compression Devices Ordered  Patient Centered Rounds: I performed bedside rounds with nursing staff today    Discussions with Specialists or Other Care Team Provider: IDJIMENA    Education and Discussions with Family / Patient: will call wife for updates  Total Time Spent on Date of Encounter in care of patient: 55 minutes This time was spent on one or more of the following: performing physical exam; counseling and coordination of care; obtaining or reviewing history; documenting in the medical record; reviewing/ordering tests, medications or procedures; communicating with other healthcare professionals and discussing with patient's family/caregivers  Current Length of Stay: 7 day(s)  Current Patient Status: Inpatient   Certification Statement: The patient will continue to require additional inpatient hospital stay due to sepsis, IV antibiotics  Discharge Plan: Anticipate discharge in 48-72 hrs to rehab facility  Code Status: Level 1 - Full Code    Subjective:   Reports feeling weak, appetite is also not that good  States he feels he is getting weaker and weaker and would like to go to [facility] once ready for discharge  No other complaints endorsed at this time  Objective:     Vitals:   Temp (24hrs), Av °F (36 7 °C), Min:97 3 °F (36 3 °C), Max:98 2 °F (36 8 °C)    Temp:  [97 3 °F (36 3 °C)-98 2 °F (36 8 °C)] 98 2 °F (36 8 °C)  HR:  [73-93] 88  Resp:  [16-18] 17  BP: (101-112)/(69-75) 107/70  SpO2:  [95 %-99 %] 99 %  Body mass index is 27 98 kg/m²  Input and Output Summary (last 24 hours): Intake/Output Summary (Last 24 hours) at 2023 1250  Last data filed at 2023 1000  Gross per 24 hour   Intake 1190 ml   Output 1352 5 ml   Net -162 5 ml       Physical Exam:   Physical Exam  Vitals and nursing note reviewed  Constitutional:       General: He is not in acute distress  Appearance: He is well-developed  He is ill-appearing (chronically)  He is not toxic-appearing  HENT:      Head: Normocephalic and atraumatic  Nose: Nose normal       Mouth/Throat:      Mouth: Mucous membranes are moist       Pharynx: Oropharynx is clear     Eyes:      Conjunctiva/sclera: Conjunctivae normal       Pupils: Pupils are equal, round, and reactive to light  Neck:      Vascular: No JVD  Cardiovascular:      Rate and Rhythm: Normal rate and regular rhythm  Pulses: Normal pulses  Pulmonary:      Effort: Pulmonary effort is normal  No respiratory distress  Breath sounds: Normal breath sounds  Abdominal:      General: Bowel sounds are normal       Palpations: Abdomen is soft  Tenderness: There is no abdominal tenderness  There is no guarding  Comments: (+) drains with minimal purulent discharge   Musculoskeletal:         General: No swelling, tenderness or deformity  Normal range of motion  Cervical back: Normal range of motion and neck supple  No rigidity  Lymphadenopathy:      Cervical: No cervical adenopathy  Skin:     General: Skin is warm and dry  Findings: No erythema  Neurological:      General: No focal deficit present  Mental Status: He is alert and oriented to person, place, and time  Mental status is at baseline  Motor: No abnormal muscle tone  Psychiatric:         Mood and Affect: Mood normal          Thought Content: Thought content normal          Additional Data:     Labs:  Results from last 7 days   Lab Units 02/14/23  0519 02/12/23  0459 02/11/23  0553   WBC Thousand/uL 11 12*   < > 8 10   HEMOGLOBIN g/dL 7 1*   < > 7 4*   HEMATOCRIT % 23 2*   < > 23 9*   PLATELETS Thousands/uL 325   < > 273   NEUTROS PCT %  --   --  77*   LYMPHS PCT %  --   --  10*   MONOS PCT %  --   --  10   EOS PCT %  --   --  0    < > = values in this interval not displayed       Results from last 7 days   Lab Units 02/14/23  0519 02/13/23  0505 02/12/23  1015   SODIUM mmol/L 132*   < > 130*   POTASSIUM mmol/L 3 9   < > 3 8   CHLORIDE mmol/L 99   < > 97   CO2 mmol/L 23   < > 25   BUN mg/dL 21   < > 15   CREATININE mg/dL 1 41*   < > 1 08   ANION GAP mmol/L 10   < > 8   CALCIUM mg/dL 7 7*   < > 7 9*   ALBUMIN g/dL  --   --  1 2*   TOTAL BILIRUBIN mg/dL  --   --  0 53   ALK PHOS U/L  -- --  843*   ALT U/L  --   --  14   AST U/L  --   --  49*   GLUCOSE RANDOM mg/dL 101   < > 212*    < > = values in this interval not displayed  Results from last 7 days   Lab Units 02/07/23  1530   INR  1 32*     Results from last 7 days   Lab Units 02/14/23  1149 02/14/23  0821 02/13/23  2107 02/13/23  1726 02/13/23  1119 02/13/23  0746 02/12/23  2117 02/12/23  1112 02/12/23  0810 02/11/23  2108 02/11/23  1612 02/11/23  1115   POC GLUCOSE mg/dl 164* 110 154* 147* 196* 121 96 303* 125 144* 109 165*         Results from last 7 days   Lab Units 02/13/23  0505 02/11/23  0512 02/10/23  0955 02/08/23  0337 02/07/23  1759 02/07/23  1530   LACTIC ACID mmol/L  --   --  0 7  --  1 2 2 4*   PROCALCITONIN ng/ml 0 75* 0 61*  --  1 37*  --  1 07*       Lines/Drains:  Invasive Devices     Central Venous Catheter Line  Duration           Port A Cath 03/10/22 Right Chest 341 days          Drain  Duration           Ileostomy LUQ 88 days    Cholecystostomy Tube 63 days    Abscess Drain Abdomen 32 days                Central Line:  Goal for removal: N/A - Chronic PICC             Imaging: Reviewed radiology reports from this admission including: chest CT scan, abdominal/pelvic CT and ultrasound(s)    Recent Cultures (last 7 days):   Results from last 7 days   Lab Units 02/07/23  1759 02/07/23  1530   BLOOD CULTURE   --  No Growth After 5 Days  No Growth After 5 Days     GRAM STAIN RESULT  3+ Gram positive rods*  1+ Gram positive cocci in chains*  Rare Disintegrating polys*  --    WOUND CULTURE  3+ Growth of Escherichia coli ESBL*  3+ Growth of Methicillin Resistant Staphylococcus aureus*  3+ Growth of  --        Last 24 Hours Medication List:   Current Facility-Administered Medications   Medication Dose Route Frequency Provider Last Rate   • acetaminophen  650 mg Oral Q4H PRN Ld Rodriguez PA-C     • amLODIPine  2 5 mg Oral Daily Ld Rodriguez PA-C     • aspirin  81 mg Oral Daily Ld Rodriguez PA-C     • atorvastatin  40 mg Oral Daily Ana Maria Henry PA-C     • collagenase   Topical Daily John Banda PA-C     • DULoxetine  30 mg Oral Daily [facility] [place]     • heparin (porcine)  5,000 Units Subcutaneous [place], [place]     • HYDROmorphone  0 2 mg Intravenous Q4H PRN Nelia Kenzie Saraiya, DO     • influenza vaccine  0 5 mL Intramuscular Prior to discharge Lino Simmons MD     • insulin glargine  5 Units Subcutaneous HS Kiara Wang PA-C     • insulin lispro  1-6 Units Subcutaneous TID AC Jonatania Georgette [place]     • insulin lispro  1-6 Units Subcutaneous HS Ana Maria Henry PA-C     • insulin lispro  4 Units Subcutaneous TID With Meals Ana Maria Henry PA-C     • melatonin  6 mg Oral HS PRN Melissa Kumari PA-C     • methocarbamol  500 mg Oral BID PRN Ana Maria Henry PA-C     • ondansetron  4 mg Intravenous Q4H PRN Nelia Kenzie Saraiya, DO     • oxyCODONE  5 mg Oral Q4H PRN Nelia Kenzie Saraiya, DO     • oxyCODONE  2 5 mg Oral Q4H PRN Nelia Kenzie Saraiya, DO     • pantoprazole  40 mg Oral BID Ana Maria Henry PA-C     • pneumococcal 13-valent conjugate vaccine  0 5 mL Intramuscular Prior to discharge Lino Simmons MD     • simethicone  80 mg Oral 4x Daily (with meals and at bedtime) Ana Maria Henry PA-C     • sodium chloride (PF)  10 mL Intravenous Daily Nelia Kenzie Saraiya, DO     • sodium chloride  75 mL/hr Intravenous Continuous Diane Jenkins MD     • tamsulosin  0 4 mg Oral Daily With [address] [facility]     • vancomycin  1,000 mg Intravenous Q24H Nelia Kenzie Saraiya, DO 1,000 mg (02/13/23 2219)        Today, Patient Was Seen By: Otilio Duverney, MD    **Please Note: This note may have been constructed using a voice recognition system  **

## 2023-02-14 NOTE — PLAN OF CARE
Problem: OCCUPATIONAL THERAPY ADULT  Goal: Performs self-care activities at highest level of function for planned discharge setting  See evaluation for individualized goals  Description: Treatment Interventions: ADL retraining, Functional transfer training, Endurance training, Patient/family training, Compensatory technique education          See flowsheet documentation for full assessment, interventions and recommendations  Note: Limitation: Decreased ADL status  Prognosis: Good  Assessment: Patient participated in Skilled OT session this date with interventions consisting of ADL re training with the use of correct body mechnaics, therapeutic exercise to: increase functional use of BUEs, increase BUE muscle strength  and  therapeutic activities to: increase activity tolerance   Patient agreeable to OT treatment session, upon arrival patient was found supine in bed and in no apparent distress  Patient completed bed mobility with min assist and functional transfers with min assist of 2  While seated at EOB, pt required sup-min assist for UB ADLs and mod-total assist for LB ADLs  While seated at EOB, pt completed 1 set x 15 reps of BUE exercises  In comparison to previous session, patient with improvements in sitting tolerance and endurance  Patient requiring one step directives and frequent rest periods  Patient continues to be functioning below baseline level, occupational performance remains limited secondary to factors listed above and increased risk for falls and injury  From OT standpoint, recommendation at time of d/c would be Post acute rehabilitation services  Patient to benefit from continued Occupational Therapy treatment while in the hospital to address deficits as defined above and maximize level of functional independence with ADLs and functional mobility       OT Discharge Recommendation: Post acute rehabilitation services     Niurka ALBERTO

## 2023-02-14 NOTE — PROGRESS NOTES
Sebastian Spain is a 61 y o  male who is currently ordered Vancomycin IV with management by the Pharmacy Consult service  Relevant clinical data and objective / subjective history reviewed  Vancomycin Assessment:  Indication and Goal AUC/Trough: Other, Intra-abdominal sepsis with possible wound infection, -600, trough >10  Clinical Status: stable, ID following  Micro:       Renal Function:  SCr: 1 41 mg/dL  CrCl: 53 mL/min  Renal replacement: Not on dialysis  Days of Therapy: 5  Current Dose: 1000mg IV q24h  Vancomycin Plan:  New Dosinmg IV q24h  Estimated AUC: 487 mcg*hr/mL  Estimated Trough: 15 4 mcg/mL  Next Level:  AM draw  Renal Function Monitoring: Daily BMP and [place] will continue to follow closely for s/sx of nephrotoxicity, infusion reactions and appropriateness of therapy  BMP and CBC will be ordered per protocol  We will continue to follow the patient’s culture results and clinical progress daily      Tao Cox, Pharmacist

## 2023-02-14 NOTE — ASSESSMENT & PLAN NOTE
Follows w/ outpatient [facility] onc and palliative care   Extensive abdominal surgical history (11/7 ex lap w/ section 3 liver resection, and development of left upper quadrant hematoma and suspected leak from transverse colon anastomosis complication; 25/99 right hemicolectomy; 11/17 re-exploration w/partial descending colectomy; 12/8 IR percutaneous cholecystostomy tube and hepatectomy abscess drainage; 12/20 IR drains placed for perisplenic abscess)  · Patient does not wish to follow with [place] oncology anymore as it is too far of a drive; would like to establish with [place] oncology (already follows with Dr Jair Thorpe)  · Continue outpatient f/u

## 2023-02-14 NOTE — ASSESSMENT & PLAN NOTE
RUQ US revealed 7 mm nonobstructing right intrarenal calculus  No hydronephrosis noted on CT a/p  Asymptomatic at this time     · Flomax  · Strain urine  · Intake and output

## 2023-02-14 NOTE — PROGRESS NOTES
Progress Note - Infectious Disease   Severo Herr 61 y o  male MRN: [id number]  Unit/Bed#: -01 Encounter: [id number]      Impression/Plan:  1   Systemic inflammatory response syndrome   Leukocytosis and tachycardia   Present on admission   Possible sepsis without a well-defined source   The patient CT chest abdomen and pelvis does not reveal any new source of sepsis   Urinalysis nondiagnostic for UTI   The abdominal wound has been debrided   Unclear significance of the extremely high alkaline phosphatase level with the total bilirubin normal and the remainder of the transaminases not proportionally high   Consideration for the possibility of bacteremia   Right upper quadrant ultrasound without biliary ductal dilatation   Wound culture with ESBL and MRSA   No recurrence of the fever and the white cell count has come down substantially  -Continue vancomycin through 2/16/2023 to complete 7 days of treatment  -Pharmacy follow-up for vancomycin dose management  -Discontinue ertapenem after last dose today as the patient has received more than a week of ESBL coverage  -Surgery follow-up  -Recheck CBC with differential, CMP, procalcitonin level  -Source control measures as below  -Supportive care     2   Intra-abdominal abscesses   Status post IR drainage with drains remaining in place but with improved findings on repeat CT scan   Patient completed several weeks of intravenous antibiotics   No evidence of a new abscess   -Antibiotics as above for now  -Surgery follow-up  -Serial exams     3   ESBL E  coli bacteremia/abscess formation   Status post prolonged course of intravenous antibiotics with clearance of the bacteremia   Abscess is improved as above  -Antibiotics as above for now  -Follow-up blood cultures     4   Abdominal wound   No overt cellulitis or purulence seen although there is some exudate   Possible mild wound infection that is status post debridement by surgery   ESBL and MRSA    -Local wound care  -Serial abdominal exams  -Surgery follow-up  -Antibiotics as above     5   Metastatic colon cancer   Status post extensive surgical resection with reexploration in the setting of complication     6   Acute kidney injury   Suspect secondary to prerenal issues   No other clear source appreciated   Renal function improving and now stabilized  modest worsening since yesterday  -Recheck BMP  -Volume management  -Dose adjust the antibiotics as needed    Have discussed the above antibiotic management plan with the primary service and they agree with the plan    Antibiotics:  Ertapenem 5  Vancomycin 5  Antibiotics 8    Subjective:  Patient has no fever, chills, sweats; no nausea, vomiting, diarrhea; no cough, shortness of breath; no increased pain  No new symptoms  He is now sitting in a chair and seems in better spirits    Objective:  Vitals:  Temp:  [97 3 °F (36 3 °C)-98 2 °F (36 8 °C)] 98 2 °F (36 8 °C)  HR:  [73-93] 88  Resp:  [16-18] 17  BP: (101-112)/(69-75) 107/70  SpO2:  [95 %-98 %] 95 %  Temp (24hrs), Av °F (36 7 °C), Min:97 3 °F (36 3 °C), Max:98 2 °F (36 8 °C)  Current: Temperature: 98 2 °F (36 8 °C)    Physical Exam:   General Appearance:  Alert, interactive, nontoxic, no acute distress  Throat: Oropharynx moist without lesions  Lungs:   Clear to auscultation bilaterally; no wheezes, rhonchi or rales; respirations unlabored   Heart:  RRR; no murmur, rub or gallop   Abdomen:   Soft, non-tender, non-distended, positive bowel sounds  Ostomy with good output  Right-sided biliary drain with output  VICTORINO drain in place  Midline incision dressed with a dry dressing in place   Extremities: No clubbing, cyanosis or edema   Skin: No new rashes or lesions  No draining wounds noted         Labs, Imaging, & Other studies:   All pertinent labs and imaging studies were personally reviewed  Results from last 7 days   Lab Units 23  0519 23  1827 23  0505 23  0459   WBC Thousand/uL 11 12* --  9 71 9 12   HEMOGLOBIN g/dL 7 1* 7 4* 7 1* 7 2*   PLATELETS Thousands/uL 325  --  316 304     Results from last 7 days   Lab Units 02/14/23  0519 02/13/23  0505 02/12/23  1015 02/10/23  0511 02/09/23  0635 02/08/23  0337   SODIUM mmol/L 132* 133* 130*   < > 134* 134*   POTASSIUM mmol/L 3 9 3 9 3 8   < > 4 4 4 8   CHLORIDE mmol/L 99 99 97   < > 103 102   CO2 mmol/L 23 25 25   < > 21 21   BUN mg/dL 21 20 15   < > 33* 53*   CREATININE mg/dL 1 41* 1 25 1 08   < > 1 47* 1 93*   EGFR ml/min/1 73sq m 54 63 75   < > 51 37   CALCIUM mg/dL 7 7* 7 9* 7 9*   < > 8 4 8 6   AST U/L  --   --  49*  --  59* 61*   ALT U/L  --   --  14  --  17 20   ALK PHOS U/L  --   --  843*  --  925* 1,043*    < > = values in this interval not displayed  Results from last 7 days   Lab Units 02/07/23  1759 02/07/23  1530   BLOOD CULTURE   --  No Growth After 5 Days  No Growth After 5 Days     GRAM STAIN RESULT  3+ Gram positive rods*  1+ Gram positive cocci in chains*  Rare Disintegrating polys*  --    WOUND CULTURE  3+ Growth of Escherichia coli ESBL*  3+ Growth of Methicillin Resistant Staphylococcus aureus*  3+ Growth of  --      Results from last 7 days   Lab Units 02/13/23  0505 02/11/23  0512 02/08/23  0337 02/07/23  1530   PROCALCITONIN ng/ml 0 75* 0 61* 1 37* 1 07*         Results from last 7 days   Lab Units 02/13/23  0505   FERRITIN ng/mL 2,911*

## 2023-02-14 NOTE — UTILIZATION REVIEW
Continued Stay Review    Date: 2/14                          Current Patient Class: Inpatient   Current Level of Care: MEd/surg    HPI:57 y o  male initially admitted on 2/7     Assessment/Plan:PT/OT Recommending post acute rehab  Awaiting acceptance  Has generalized weakness with biliary drain in place  Pale  Creat mildly elevated  IV fluids at 75 ml/hr  Recheck BMP, CBC  ID consult: Continue vanco IV through 2/16  Recheck CBC with diff, cmp, procal   S/p IR drainage with drains remaining in place       Vital Signs:   /Time Temp Pulse Resp BP MAP (mmHg) SpO2 O2 Device   02/14/23 0859 -- -- -- -- -- 99 % None (Room air)   02/14/23 08:17:15 -- 88 -- 107/70 82 95 % --   02/14/23 08:09:14 -- 93 -- 108/70 83 96 % --   02/14/23 08:03:58 98 2 °F (36 8 °C) 77 -- 101/69 80 96 % --   02/14/23 08:01:33 98 2 °F (36 8 °C) 75 17 112/74 87 97 % --   02/14/23 0100 -- -- -- -- -- 98 % None (Room air)         Pertinent Labs/Diagnostic Results:   Results from last 7 days   Lab Units 02/07/23  1530   SARS-COV-2  Negative     Results from last 7 days   Lab Units 02/14/23  0519 02/13/23  1827 02/13/23  0505 02/12/23  0459 02/11/23  0553 02/10/23  0511 02/09/23  0635   WBC Thousand/uL 11 12*  --  9 71 9 12 8 10 14 81* 19 39*   HEMOGLOBIN g/dL 7 1* 7 4* 7 1* 7 2* 7 4* 8 1* 7 5*   HEMATOCRIT % 23 2* 24 7* 23 4* 23 8* 23 9* 26 3* 24 9*   PLATELETS Thousands/uL 325  --  316 304 273 375 366   NEUTROS ABS Thousands/µL  --   --   --   --  6 31 13 03* 16 25*         Results from last 7 days   Lab Units 02/14/23  0519 02/13/23  0505 02/12/23  1015 02/11/23  0512 02/10/23  0511   SODIUM mmol/L 132* 133* 130* 131* 132*   POTASSIUM mmol/L 3 9 3 9 3 8 4 1 4 3   CHLORIDE mmol/L 99 99 97 97 101   CO2 mmol/L 23 25 25 22 21   ANION GAP mmol/L 10 9 8 12 10   BUN mg/dL 21 20 15 12 24   CREATININE mg/dL 1 41* 1 25 1 08 0 84 1 17   EGFR ml/min/1 73sq m 54 63 75 96 68   CALCIUM mg/dL 7 7* 7 9* 7 9* 7 1* 8 4     Results from last 7 days   Lab Units 02/12/23  1015 02/09/23  0635 02/08/23  0337 02/07/23  1530   AST U/L 49* 59* 61* 62*   ALT U/L 14 17 20 26   ALK PHOS U/L 843* 925* 1,043* 1,300*   TOTAL PROTEIN g/dL 6 8 7 4 7 8 9 8*   ALBUMIN g/dL 1 2* 1 3* 1 3* 1 7*   TOTAL BILIRUBIN mg/dL 0 53 0 68 0 75 0 84   BILIRUBIN DIRECT mg/dL 0 35* 0 48*  --   --      Results from last 7 days   Lab Units 02/14/23  1149 02/14/23  0821 02/13/23  2107 02/13/23  1726 02/13/23  1119 02/13/23  0746 02/12/23  2117 02/12/23  1112 02/12/23  0810 02/11/23  2108 02/11/23  1612 02/11/23  1115   POC GLUCOSE mg/dl 164* 110 154* 147* 196* 121 96 303* 125 144* 109 165*     Results from last 7 days   Lab Units 02/14/23  0519 02/13/23  0505 02/12/23  1015 02/11/23  0512 02/10/23  0511 02/09/23  0635 02/08/23  0337 02/07/23 2014 02/07/23  1530   GLUCOSE RANDOM mg/dL 101 121 212* 119 131 107 124 222* 298*         Results from last 7 days   Lab Units 02/07/23  1530   PROTIME seconds 16 1*   INR  1 32*         Results from last 7 days   Lab Units 02/13/23  0505 02/11/23  0512 02/08/23  0337 02/07/23  1530   PROCALCITONIN ng/ml 0 75* 0 61* 1 37* 1 07*     Results from last 7 days   Lab Units 02/10/23  0955 02/07/23  1759 02/07/23  1530   LACTIC ACID mmol/L 0 7 1 2 2 4*         Results from last 7 days   Lab Units 02/13/23  0505   FERRITIN ng/mL 2,911*       Results from last 7 days   Lab Units 02/07/23  1805   CLARITY UA  Clear   COLOR UA  Light Yellow   SPEC GRAV UA  1 012   PH UA  5 0   GLUCOSE UA mg/dl Negative   KETONES UA mg/dl Negative   BLOOD UA  Negative   PROTEIN UA mg/dl 30 (1+)*   NITRITE UA  Negative   BILIRUBIN UA  Negative   UROBILINOGEN UA (BE) mg/dl <2 0   LEUKOCYTES UA  Negative   WBC UA /hpf 4-10*   RBC UA /hpf 1-2   BACTERIA UA /hpf None Seen   EPITHELIAL CELLS WET PREP /hpf None Seen     Results from last 7 days   Lab Units 02/07/23  1530   INFLUENZA A PCR  Negative   INFLUENZA B PCR  Negative   RSV PCR  Negative       Results from last 7 days   Lab Units 02/07/23  5424 02/07/23  1530   BLOOD CULTURE   --  No Growth After 5 Days  No Growth After 5 Days  GRAM STAIN RESULT  3+ Gram positive rods*  1+ Gram positive cocci in chains*  Rare Disintegrating polys*  --    WOUND CULTURE  3+ Growth of Escherichia coli ESBL*  3+ Growth of Methicillin Resistant Staphylococcus aureus*  3+ Growth of  --          Medications:   Scheduled Medications:  amLODIPine, 2 5 mg, Oral, Daily  aspirin, 81 mg, Oral, Daily  atorvastatin, 40 mg, Oral, Daily  collagenase, , Topical, Daily  DULoxetine, 30 mg, Oral, Daily  heparin (porcine), 5,000 Units, Subcutaneous, Q8H SHANNON  insulin glargine, 5 Units, Subcutaneous, HS  insulin lispro, 1-6 Units, Subcutaneous, TID AC  insulin lispro, 1-6 Units, Subcutaneous, HS  insulin lispro, 4 Units, Subcutaneous, TID With Meals  pantoprazole, 40 mg, Oral, BID  simethicone, 80 mg, Oral, 4x Daily (with meals and at bedtime)  sodium chloride (PF), 10 mL, Intravenous, Daily  tamsulosin, 0 4 mg, Oral, Daily With Dinner  vancomycin, 1,000 mg, Intravenous, Q24H      Continuous IV Infusions:  sodium chloride, 75 mL/hr, Intravenous, Continuous      PRN Meds:  acetaminophen, 650 mg, Oral, Q4H PRN  HYDROmorphone, 0 2 mg, Intravenous, Q4H PRN  influenza vaccine, 0 5 mL, Intramuscular, Prior to discharge  melatonin, 6 mg, Oral, HS PRN  methocarbamol, 500 mg, Oral, BID PRN  ondansetron, 4 mg, Intravenous, Q4H PRN  oxyCODONE, 5 mg, Oral, Q4H PRN  oxyCODONE, 2 5 mg, Oral, Q4H PRN  pneumococcal 13-valent conjugate vaccine, 0 5 mL, Intramuscular, Prior to discharge        Discharge Plan: D    Network Utilization Review Department  ATTENTION: Please call with any questions or concerns to [phone] and carefully listen to the prompts so that you are directed to the right person   All voicemails are confidential   Farrel Bodily all requests for admission clinical reviews, approved or denied determinations and any other requests to dedicated fax number below belonging to the campus where the patient is receiving treatment   List of dedicated fax numbers for the Facilities:  [address] [place] [address] DENIALS (Administrative/Medical Necessity) [phone]   [address] [place] [address] (Maternity/NICU/Pediatrics) [phone]   [address] Eric[address]into [phone]   Sachi Tyler 77 [phone]   [address] [address] [facility] [phone]   [address] [facility] Janee Pete [facility] 134 815 [facility] [phone]

## 2023-02-14 NOTE — PHYSICAL THERAPY NOTE
PHYSICAL THERAPY TREATMENT  NAME:  Celena Herrera  DATE: 02/14/23    AGE:   61 y o  Mrn:   [id number]  ADMIT DX:  Abdominal pain [R10 9]  Wound infection [T14  8XXA, L08 9]  Acute kidney injury ([facility] ) [N17 9]  Sepsis, due to unspecified organism, unspecified whether acute organ dysfunction present [facility]) [A41 9]  Problem List:   Patient Active Problem List   Diagnosis    Type 2 diabetes mellitus without complication, with long-term current use of insulin ([facility] )    Benign essential hypertension    Mixed hyperlipidemia    Erectile dysfunction    Low testosterone    Obesity (BMI 30-39  9)    Testicular hypogonadism    Incarcerated umbilical hernia    Left ureteral calculus    Type 2 diabetes mellitus with hyperlipidemia (HCC)    Microcytic anemia    Hypokalemia    Transaminitis    Thrombocytosis    Iron deficiency anemia, unspecified    Malignant neoplasm of transverse colon (HCC)    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)    Colon cancer metastasized to liver [facility])    Colostomy prolapse (HCC)    Other fatigue    Cervical radiculopathy    Encephalopathy    MR (mitral regurgitation)    ESBL (extended spectrum beta-lactamase) producing bacteria infection    Severe protein-calorie malnutrition (HCC)    Acute on chronic kidney failure (HCC)    Sepsis (HCC)    Abscess    Acute pain    Leukocytosis    Dehiscence of incision    Elevated alkaline phosphatase level    Abnormal CT scan    Hyperkalemia    Hyponatremia    Nephrolithiasis       Past Medical History  Past Medical History:   Diagnosis Date    Abdominal pain 03/12/2022    Acute renal failure ([facility] )     [id number] resolved    Acute respiratory failure with hypoxia ([facility] ) 11/12/2022    Bacteremia 11/12/2022    Cancer ([facility] )     Diabetes mellitus ([facility] )     Enteritis 08/23/2016    Flash pulmonary edema ([facility] ) 11/12/2022    Gastroparesis due to DM ([facility] ) 08/23/2016    GERD (gastroesophageal reflux disease)     Hernia, ventral 08/04/2016    Hyperlipidemia Hypertension     Morbid obesity ([facility] ) 04/17/2018    Postoperative visit 03/02/2022    SIRS (systemic inflammatory response syndrome) ([facility] ) 03/12/2022    Snoring     Stage 3a chronic kidney disease ([facility] ) 02/19/2022       Past Surgical History  Past Surgical History:   Procedure Laterality Date    COLONOSCOPY      COLOSTOMY N/A 02/20/2022    Procedure: COLOSTOMY LOOP, diverting;  Surgeon: John Owens MD;  Location: [facility];  Service: General    ESOPHAGOGASTRODUODENOSCOPY N/A 08/24/2016    Procedure: ESOPHAGOGASTRODUODENOSCOPY (EGD); Surgeon: Rody Fountain MD;  Location: [facility];   Service:     EXPLORATORY LAPAROTOMY W/ BOWEL RESECTION N/A 11/16/2022    Procedure: LAPAROTOMY EXPLORATORY W/ BOWEL RESECTION- VAC PLACEMENT;  Surgeon: Tai Phelan MD;  Location: [facility];  Service: Surgical Oncology    EXPLORATORY LAPAROTOMY W/ BOWEL RESECTION N/A 11/17/2022    Procedure: LAPAROTOMY EXPLORATORY W/ BOWEL RESECTION;  Surgeon: Tai Phelan MD;  [facility];  Service: Surgical Oncology    ILEOSTOMY N/A 11/17/2022    Procedure: ILEOSTOMY;  Surgeon: Tai Phelan MD;  [facility]: [facility];  Service: Surgical Oncology    ILEOSTOMY CLOSURE N/A 11/7/2022    Procedure: REVERSAL COLOSTOMY;  Surgeon: Juan Antonio Davis MD;  [facility];  Service: Surgical Oncology    IR CHOLECYSTOSTOMY TUBE CHECK/CHANGE/REPOSITION/REINSERTION/UPSIZE  12/12/2022    IR CHOLECYSTOSTOMY TUBE PLACEMENT  12/8/2022    IR DRAINAGE TUBE CHECK AND/OR REMOVAL  1/3/2023    IR DRAINAGE TUBE CHECK/CHANGE/REPOSITION/REINSERTION/UPSIZE  1/12/2023    IR DRAINAGE TUBE CHECK/CHANGE/REPOSITION/REINSERTION/UPSIZE  1/31/2023    IR DRAINAGE TUBE CHECK/CHANGE/REPOSITION/REINSERTION/UPSIZE  2/10/2023    IR DRAINAGE TUBE PLACEMENT  12/8/2022    IR DRAINAGE TUBE PLACEMENT  12/20/2022    IR PORT PLACEMENT  03/10/2022    IR THORACENTESIS  1/12/2023    KIDNEY STONE SURGERY      LIVER BIOPSY LAPAROSCOPIC N/A 02/20/2022    Procedure: DIAGNOSTIC LAPAROSCOPIC LIVER BIOPSY, DIVERTING LOOP COLOSTOMY ;  Surgeon: Tonya Ibarra MD;  [facility];  Service: General    LIVER LOBECTOMY N/A 11/7/2022    Procedure: SEGMENT 3 LIVER RESECTION, ABLATION, INTRAOPERATIVE U/S OF LIVER, PERITONEAL BIOPSY;  Surgeon: Sueellen Habermann, MD;  [facility]: [facility];  Service: Surgical Oncology    RIGHT COLON RESECTION N/A 11/7/2022    Procedure: EX LAP, TRANVERSE COLON RESECTION;  Surgeon: Sueellen Habermann, MD;  [facility];  Service: Surgical Oncology    TONSILLECTOMY      UMBILICAL HERNIA REPAIR LAPAROSCOPIC N/A 04/26/2019    Procedure: LAPAROSCOPIC UMBILICAL HERNIA REPAIR;  Surgeon: Tonya Ibarra MD;  [facility];  Service: General    VAC DRESSING APPLICATION N/A 40/39/8252    Procedure: APPLICATION VAC DRESSING ABDOMEN/TRUNK;  Surgeon: Ariadne Wisdom MD;  [facility];  Service: Surgical Oncology       Length Of Stay: 7  Performed at least 2 patient identifiers during session: Name and Birthday       02/14/23 0757   PT Last Visit   PT Visit Date 02/14/23   Note Type   Note Type Treatment   Pain Assessment   Pain Assessment Tool 0-10   Pain Score No Pain   Restrictions/Precautions   Weight Bearing Precautions Per Order No   Other Precautions Contact/isolation; Chair Alarm; Bed Alarm;Multiple lines; Fall Risk;Pain  (VICTORINO drain, colostomy)   General   Chart Reviewed Yes   Response to Previous Treatment Patient with no complaints from previous session  Family/Caregiver Present No   Cognition   Overall Cognitive Status WFL   Arousal/Participation Alert; Cooperative   Attention Within functional limits   Orientation Level Oriented X4   Memory Within functional limits   Following Commands Follows all commands and directions without difficulty   Comments Pt agreeable to PT session   Subjective   Subjective "I just feel weak"   Bed Mobility   Supine to Sit 5  Supervision   Additional items Assist x 1;HOB elevated; Bedrails; Increased time required;Verbal cues Sit to Supine   (not tested as pt seated in bedside chair at end of session)   Additional Comments BP supine 112/74, BP sitting /69, BP post ambulation 108/70  HR 75-93bpm throughout and SPo2 95-97% on RA  Pt reported dizziness upon initially sitting EOB and after transfer to chair that subsided within 3-4 minutes   Transfers   Sit to Stand 3  Moderate assistance   Additional items Assist x 1; Increased time required;Verbal cues   Stand to Sit 4  Minimal assistance   Additional items Assist x 1; Increased time required;Verbal cues   Stand pivot 4  Minimal assistance   Additional items Increased time required;Verbal cues   Additional Comments RW used during transfers   Ambulation/Elevation   Gait pattern Improper Weight shift;Decreased foot clearance;Shuffling; Step to;Decreased heel strike;Decreased hip extension   Gait Assistance 4  Minimal assist   Additional items Assist x 2;Verbal cues  (second person to assist with line management)   Assistive Device Rolling walker   Distance 5'   Stair Management Assistance Not tested   Ambulation/Elevation Additional Comments Ambulation distance limited by pt fatigue   Balance   Static Sitting Fair +   Dynamic Sitting Fair   Static Standing Fair -   Dynamic Standing Fair -   Ambulatory Fair -   Endurance Deficit   Endurance Deficit Yes   Endurance Deficit Description decreased activity tolerance   Activity Tolerance   Activity Tolerance Patient limited by fatigue   Medical Staff Made Aware Discussed with    Nurse Made Aware DAKSHA Correa confirmed pt appropraite for PT session and made aware of session outcomes   Assessment   Prognosis Good   Problem List Decreased strength;Decreased endurance; Impaired balance;Decreased mobility   Assessment Pt seen for PT treatment session this date, consisting of ther act focused on transfer training and gt training on level surfaces to improve pt safety in household environment   Since previous session, pt has made fair progress in terms of activity tolerance and assist required for mobility  This date, patient greeted supine in bed reporting 0/10 pain and agreed to PT session including out of bed transfer to chair  Patient required supervision for sup > sit with HOB elevated and use of bedrails; patient did report dizziness upon initially sitting up that subsided within ~3-4 minutes, BP monitored as documented in flow sheet  Patient required Charline [place] for STS to RW and progressed to ambulation 5' and SPT with RW minAx1 + SBA second person to manage lines  Further mobility deferred due to patient reporting overall fatigue and "I just feel weak " Pertinent barriers during this session include fatigue  Current goals and POC remain appropriate, pt continues to have rehab potential and is making good progress towards STGs  Pt prognosis for achieving goals is good, pending pt progress with hospitalization/medical status improvements, and indicated by orientation, ability to follow directions, motivation, supportive family/caregivers, previous response to intervention and responsive to cues/strategies  Pt limited d/t fear of falling and fatigue  PT recommends post acute rehabilitation services upon discharge  Pt continues to be functioning below baseline level, and remains limited 2* factors listed above  PT will continue to see pt during current hospitalization in order to address the deficits listed above and provide interventions consistent w/ POC in effort to achieve STGs  Barriers to Discharge Inaccessible home environment   Goals   Patient Goals to go to rehab   STG Expiration Date 02/18/23   Short Term Goal #1 Patients goals remain appropraite  Continue with plan of care   PT Treatment Day 2   Plan   Treatment/Interventions Functional transfer training; Therapeutic exercise; Endurance training;Patient/family training;Bed mobility;Gait training;Spoke to nursing;Continued evaluation   Progress Progressing toward goals   PT Frequency 3-5x/wk   Recommendation   PT Discharge Recommendation Post acute rehabilitation services   Equipment Recommended [facility] Recommended Wheeled walker   AM-PAC Basic Mobility Inpatient   Turning in Flat Bed Without Bedrails 3   Lying on Back to Sitting on Edge of Flat Bed Without Bedrails 3   Moving Bed to Chair 3   Standing Up From Chair Using Arms 2   Walk in Room 3   Climb 3-5 Stairs With Railing 2   Basic Mobility Inpatient Raw Score 16   Basic Mobility Standardized Score 38 32   Highest Level Of Mobility   JH-HLM Goal 5: Stand one or more mins   -HLM Achieved 4: Move to chair/commode   Education   Education Provided Mobility training   Patient Reinforcement needed   End of Consult   Patient Position at End of Consult Bedside chair;Bed/Chair alarm activated; All needs within reach   Time In: Kirt Amaro  Time Out: 0820  Total Treatment Minutes: [address] [place], [place]

## 2023-02-14 NOTE — PLAN OF CARE
Problem: PAIN - ADULT  Goal: Verbalizes/displays adequate comfort level or baseline comfort level  Description: Interventions:  - Encourage patient to monitor pain and request assistance  - Assess pain using appropriate pain scale  - Administer analgesics based on type and severity of pain and evaluate response  - Implement non-pharmacological measures as appropriate and evaluate response  - Consider cultural and social influences on pain and pain management  - Notify physician/advanced practitioner if interventions unsuccessful or patient reports new pain  Outcome: Progressing     Problem: INFECTION - ADULT  Goal: Absence or prevention of progression during hospitalization  Description: INTERVENTIONS:  - Assess and monitor for signs and symptoms of infection  - Monitor lab/diagnostic results  - Monitor all insertion sites, i e  indwelling lines, tubes, and drains  - Monitor endotracheal if appropriate and nasal secretions for changes in amount and color  - Dow appropriate cooling/warming therapies per order  - Administer medications as ordered  - Instruct and encourage patient and family to use good hand hygiene technique  - Identify and instruct in appropriate isolation precautions for identified infection/condition  Outcome: Progressing  Goal: Absence of fever/infection during neutropenic period  Description: INTERVENTIONS:  - Monitor WBC    Outcome: Progressing     Problem: SAFETY ADULT  Goal: Patient will remain free of falls  Description: INTERVENTIONS:  - Educate patient/family on patient safety including physical limitations  - Instruct patient to call for assistance with activity   - Consult OT/PT to assist with strengthening/mobility   - Keep Call bell within reach  - Keep bed low and locked with side rails adjusted as appropriate  - Keep care items and personal belongings within reach  - Initiate and maintain comfort rounds  - Make Fall Risk Sign visible to staff  - Offer Toileting every 2 Hours, in advance of need  - Initiate/Maintain alarm  - Obtain necessary fall risk management equipment:   - Apply yellow socks and bracelet for high fall risk patients  - Consider moving patient to room near nurses station  Outcome: Progressing  Goal: Maintain or return to baseline ADL function  Description: INTERVENTIONS:  -  Assess patient's ability to carry out ADLs; assess patient's baseline for ADL function and identify physical deficits which impact ability to perform ADLs (bathing, care of mouth/teeth, toileting, grooming, dressing, etc )  - Assess/evaluate cause of self-care deficits   - Assess range of motion  - Assess patient's mobility; develop plan if impaired  - Assess patient's need for assistive devices and provide as appropriate  - Encourage maximum independence but intervene and supervise when necessary  - Involve family in performance of ADLs  - Assess for home care needs following discharge   - Consider OT consult to assist with ADL evaluation and planning for discharge  - Provide patient education as appropriate  Outcome: Progressing  Goal: Maintains/Returns to pre admission functional level  Description: INTERVENTIONS:  - Perform BMAT or MOVE assessment daily    - Set and communicate daily mobility goal to care team and patient/family/caregiver  - Collaborate with rehabilitation services on mobility goals if consulted  - Perform Range of Motion 2 times a day  - Reposition patient every 2 hours    - Dangle patient 2 times a day  - Stand patient 2 times a day  - Ambulate patient 2 times a day  - Out of bed to chair 2 times a day   - Out of bed for meals 2 times a day  - Out of bed for toileting  - Record patient progress and toleration of activity level   Outcome: Progressing     Problem: DISCHARGE PLANNING  Goal: Discharge to home or other facility with appropriate resources  Description: INTERVENTIONS:  - Identify barriers to discharge w/patient and caregiver  - Arrange for needed discharge resources and transportation as appropriate  - Identify discharge learning needs (meds, wound care, etc )  - Arrange for interpretive services to assist at discharge as needed  - Refer to Case Management Department for coordinating discharge planning if the patient needs post-hospital services based on physician/advanced practitioner order or complex needs related to functional status, cognitive ability, or social support system  Outcome: Progressing     Problem: Knowledge Deficit  Goal: Patient/family/caregiver demonstrates understanding of disease process, treatment plan, medications, and discharge instructions  Description: Complete learning assessment and assess knowledge base    Interventions:  - Provide teaching at level of understanding  - Provide teaching via preferred learning methods  Outcome: Progressing     Problem: SKIN/TISSUE INTEGRITY - ADULT  Goal: Skin Integrity remains intact(Skin Breakdown Prevention)  Description: Assess:  -Perform Michael assessment every shift  -Clean and moisturize skin every shift and PRN  -Inspect skin when repositioning, toileting, and assisting with ADLS  -Assess under medical devices  -Assess extremities for adequate circulation and sensation     Bed Management:  -Have minimal linens on bed & keep smooth, unwrinkled  -Change linens as needed when moist or perspiring  -Avoid sitting or lying in one position for more than 2 hours while in bed  -Keep HOB at 30 degrees     Toileting:  -Offer bedside commode  -Assess for incontinence every shift and q 2 hours  -Use incontinent care products after each incontinent episode    Activity:  -Mobilize patient 2 times a day  -Encourage activity and walks on unit  -Encourage or provide ROM exercises   -Turn and reposition patient every 2 Hours  -Use appropriate equipment to lift or move patient in bed  -Instruct/ Assist with weight shifting every 2 hours when out of bed in chair  -Consider limitation of chair time 2 hour intervals    Skin Care:  -Avoid use of baby powder, tape, friction and shearing, hot water or constrictive clothing  -Relieve pressure over bony prominences using foam wedge  -Do not massage red bony areas    Next Steps:  -Teach patient strategies to minimize risks   -Consider consults to  interdisciplinary teams  Outcome: Progressing  Goal: Incision(s), wounds(s) or drain site(s) healing without S/S of infection  Description: INTERVENTIONS  - Assess and document dressing, incision, wound bed, drain sites and surrounding tissue  - Provide patient and family education  - Perform skin care/dressing changes every shift and per wound care orders  Outcome: Progressing  Goal: Pressure injury heals and does not worsen  Description: Interventions:  - Implement low air loss mattress or specialty surface (Criteria met)  - Apply silicone foam dressing  - Instruct/assist with weight shifting every 30 minutes when in chair   - Limit chair time to 2 hour intervals  - Use special pressure reducing interventions such as foam wedge when in chair   - Apply fecal or urinary incontinence containment device   - Perform passive or active ROM  - Turn and reposition patient & offload bony prominences every 2 hours   - Utilize friction reducing device or surface for transfers   - Consider consults to  interdisciplinary teams  - Use incontinent care products after each incontinent episode  - Consider nutrition services referral as needed  Outcome: Progressing     Problem: Prexisting or High Potential for Compromised Skin Integrity  Goal: Skin integrity is maintained or improved  Description: INTERVENTIONS:  - Identify patients at risk for skin breakdown  - Assess and monitor skin integrity  - Assess and monitor nutrition and hydration status  - Monitor labs   - Assess for incontinence   - Turn and reposition patient  - Assist with mobility/ambulation  - Relieve pressure over bony prominences  - Avoid friction and shearing  - Provide appropriate hygiene as needed including keeping skin clean and dry  - Evaluate need for skin moisturizer/barrier cream  - Collaborate with interdisciplinary team   - Patient/family teaching  - Consider wound care consult   Outcome: Progressing     Problem: MOBILITY - ADULT  Goal: Maintain or return to baseline ADL function  Description: INTERVENTIONS:  -  Assess patient's ability to carry out ADLs; assess patient's baseline for ADL function and identify physical deficits which impact ability to perform ADLs (bathing, care of mouth/teeth, toileting, grooming, dressing, etc )  - Assess/evaluate cause of self-care deficits   - Assess range of motion  - Assess patient's mobility; develop plan if impaired  - Assess patient's need for assistive devices and provide as appropriate  - Encourage maximum independence but intervene and supervise when necessary  - Involve family in performance of ADLs  - Assess for home care needs following discharge   - Consider OT consult to assist with ADL evaluation and planning for discharge  - Provide patient education as appropriate  Outcome: Progressing  Goal: Maintains/Returns to pre admission functional level  Description: INTERVENTIONS:  - Perform BMAT or MOVE assessment daily    - Set and communicate daily mobility goal to care team and patient/family/caregiver  - Collaborate with rehabilitation services on mobility goals if consulted  - Perform Range of Motion 2 times a day  - Reposition patient every 2 hours    - Dangle patient 2 times a day  - Stand patient 2 times a day  - Ambulate patient 2 times a day  - Out of bed to chair 2 times a day   - Out of bed for meals 2 times a day  - Out of bed for toileting  - Record patient progress and toleration of activity level   Outcome: Progressing

## 2023-02-14 NOTE — ASSESSMENT & PLAN NOTE
Noted the development of foul-smelling drainage and increasing pain from his chronic abdominal wound  · As evidenced by tachycardia, tachypnea, and leukocytosis  · In setting of purulent abdominal infection   · Hx of colon CA w/ extensive abd surgical hx as below   · Patient has several abdominal drains in place, with CT showing improvement in the fluid collections at the left liver lobe, perisplenic area, and the left retroperitoneum inferior to the spleen  · Completed IV Ertapenem, now on IV Vancomycin to complete through 2/16 based on wound cultures - now growing ESBL and MRSA  · Trend WBC; follow-up with pending infectious labs and cultures  · ID consulted; recommendations appreciated   · S/p bedside midline wound debridement + wet dressing 2/8 with general surgery

## 2023-02-14 NOTE — OCCUPATIONAL THERAPY NOTE
Occupational Therapy Treatment Note     Patient Name: Neeraj Hair  LZNTD'A Date: 2/14/2023  Problem List  Principal Problem:    Sepsis [facility])  Active Problems:    Type 2 diabetes mellitus without complication, with long-term current use of insulin (HCC)    Benign essential hypertension    Colon cancer metastasized to liver [facility])    Acute on chronic kidney failure (HCC)    Elevated alkaline phosphatase level    Abnormal CT scan    Hyperkalemia    Hyponatremia    Nephrolithiasis        02/14/23 1257   OT Last Visit   OT Visit Date 02/14/23   Note Type   Note Type Treatment   Pain Assessment   Pain Assessment Tool 0-10   Pain Score 2   Pain Location/Orientation Location: Abdomen   Pain Onset/Description Onset: Ongoing;Frequency: Constant/Continuous; Descriptor: [facility] Pain Intervention(s) Ambulation/increased activity;Repositioned;Medication (See MAR)   Restrictions/Precautions   Weight Bearing Precautions Per Order No   Other Precautions Contact/isolation; Bed Alarm; Chair Alarm;Multiple lines; Fall Risk;Pain  (VICTORINO Drain; Colostomy)   Lifestyle   Autonomy At baseline, pt is independent with ADLs, ambulatory with RW, and lives with wife   Reciprocal Relationships Supportive Family   Service to Others [place] [employer] in the [address] [place] [address] 5  Supervision/Setup   Grooming Deficit Setup;Verbal cueing; Increased time to complete   Grooming Comments Pt combed hair   UB Bathing Assistance 5  Supervision/Setup   UB Bathing Deficit Setup;Verbal cueing;Supervision/safety; Increased time to complete   LB Bathing Assistance 3  Moderate Assistance   LB Bathing Deficit Setup;Verbal cueing;Supervision/safety; Increased time to complete; Buttocks;Right lower leg including foot; Left lower leg including foot   UB Dressing Assistance 4  Minimal Assistance   UB Dressing Deficit Setup;Verbal cueing;Supervision/safety; Increased time to complete;Pull around back; Fasteners   UB Dressing Comments Pt [facility] gown   LB Dressing Assistance 1  Total Assistance   LB Dressing Deficit Don/doff R sock; Don/doff L sock   LB Dressing Comments Pt doffed/donned socks only   Bed Mobility   Supine to Sit 4  Minimal assistance   Additional items Assist x 1;HOB elevated; Bedrails; Increased time required;Verbal cues;LE management   Sit to Supine 4  Minimal assistance   Additional items Assist x 1;HOB elevated; Bedrails; Increased time required;Verbal cues;LE management   Additional Comments Pt sat EOB ~30 minutes  Pt reported (+) dizziness initially but symptoms resolved with increased seated rest  BP seated at EOB: 106/71   Transfers   Sit to Stand 4  Minimal assistance   Additional items Assist x 2;Bedrails; Increased time required   Stand to Sit 4  Minimal assistance   Additional items Assist x 2; Increased time required   Functional Mobility   Functional Mobility 4  Minimal assistance  (Assist of 2)   Additional Comments Pt ambulated short distance towards HOB  Pt limited by fatigue  Additional items Rolling walker   Therapeutic Exercise - ROM   UE-ROM Yes  (to increase strength and endurance to improve independence with ADLs)   ROM- Right Upper Extremities   R Shoulder AROM; Flexion; Extension;Horizontal ABduction  (Horizontal Adduction; Protraction/Retraction)   R Elbow AROM;Elbow flexion;Elbow extension   R Position Seated;Against gravity   R Weight/Reps/Sets 1 set x 15 reps each   ROM - Left Upper Extremities    L Shoulder AROM; Flexion; Extension;Horizontal ABduction  (Horizontal Adduction; Protraction/Retraction)   L Elbow AROM;Elbow extension;Elbow flexion   L Position Seated;Against gravity   L Weight/Reps/Sets 1 set x 15 reps each   Cognition   Overall Cognitive Status WFL   Arousal/Participation Alert; Responsive; Cooperative   Attention Within functional limits   Orientation Level Oriented X4   Memory Within functional limits   Following Commands Follows all commands and directions without difficulty   Comments Pt agreeable to OT session   Activity Tolerance   Activity Tolerance Patient limited by fatigue   Assessment   Assessment Patient participated in Skilled OT session this date with interventions consisting of ADL re training with the use of correct body mechnaics, therapeutic exercise to: increase functional use of BUEs, increase BUE muscle strength  and  therapeutic activities to: increase activity tolerance   Patient agreeable to OT treatment session, upon arrival patient was found supine in bed and in no apparent distress  Patient completed bed mobility with min assist and functional transfers with min assist of 2  While seated at EOB, pt required sup-min assist for UB ADLs and mod-total assist for LB ADLs  While seated at EOB, pt completed 1 set x 15 reps of BUE exercises  In comparison to previous session, patient with improvements in sitting tolerance and endurance  Patient requiring one step directives and frequent rest periods  Patient continues to be functioning below baseline level, occupational performance remains limited secondary to factors listed above and increased risk for falls and injury  From OT standpoint, recommendation at time of d/c would be Post acute rehabilitation services  Patient to benefit from continued Occupational Therapy treatment while in the hospital to address deficits as defined above and maximize level of functional independence with ADLs and functional mobility  Plan   Treatment Interventions ADL retraining;Functional transfer training; Endurance training;Patient/family training; Compensatory technique education   Goal Expiration Date 02/22/23   OT Treatment Day 1   OT Frequency 3-5x/wk   Recommendation   OT Discharge Recommendation Post acute rehabilitation services   Additional Comments  The patient's raw score on the AM-PAC Daily Activity Inpatient Short Form is 16  A raw score of less than 19 suggests the patient may benefit from discharge to post-acute rehabilitation services   Please refer to the recommendation of the Occupational Therapist for safe discharge planning  AM-PAC Daily Activity Inpatient   Lower Body Dressing 2   Bathing 2   Toileting 2   Upper Body Dressing 3   Grooming 3   Eating 4   Daily Activity Raw Score 16   Daily Activity Standardized Score (Calc for Raw Score >=11) 35 96   AM-PAC Applied Cognition Inpatient   Following a Speech/Presentation 4   Understanding Ordinary Conversation 4   Taking Medications 4   Remembering Where Things Are Placed or Put Away 4   Remembering List of 4-5 Errands 4   Taking Care of Complicated Tasks 4   Applied Cognition Raw Score 24   Applied Cognition Standardized Score 62 21   Modified [place] Scale   Modified [place] Scale 4   End of Consult   Patient Position at End of Consult Supine; All needs within reach   Nurse Communication Nurse aware of consult     Layla ALBERTO

## 2023-02-14 NOTE — ASSESSMENT & PLAN NOTE
CT shows interval decrease in bilateral pleural effusions, however multiple small bilateral lung nodules are seen which are concerning for possible metastatic lung disease  · Patient reports having prior known lung nodules  · Oxygenating appropriately on room air; continue to monitor oxygenation  · Outpatient f/u

## 2023-02-15 LAB
ALBUMIN SERPL BCP-MCNC: 2 G/DL (ref 3.5–5)
ALP SERPL-CCNC: 723 U/L (ref 34–104)
ALT SERPL W P-5'-P-CCNC: 9 U/L (ref 7–52)
ANION GAP SERPL CALCULATED.3IONS-SCNC: 7 MMOL/L (ref 4–13)
AST SERPL W P-5'-P-CCNC: 31 U/L (ref 13–39)
BASOPHILS # BLD AUTO: 0.03 THOUSANDS/ÂΜL (ref 0–0.1)
BASOPHILS NFR BLD AUTO: 0 % (ref 0–1)
BILIRUB SERPL-MCNC: 0.6 MG/DL (ref 0.2–1)
BUN SERPL-MCNC: 22 MG/DL (ref 5–25)
CALCIUM ALBUM COR SERPL-MCNC: 9.5 MG/DL (ref 8.3–10.1)
CALCIUM SERPL-MCNC: 7.9 MG/DL (ref 8.4–10.2)
CHLORIDE SERPL-SCNC: 103 MMOL/L (ref 96–108)
CO2 SERPL-SCNC: 21 MMOL/L (ref 21–32)
CREAT SERPL-MCNC: 1.21 MG/DL (ref 0.6–1.3)
EOSINOPHIL # BLD AUTO: 0 THOUSAND/ÂΜL (ref 0–0.61)
EOSINOPHIL NFR BLD AUTO: 0 % (ref 0–6)
ERYTHROCYTE [DISTWIDTH] IN BLOOD BY AUTOMATED COUNT: 17.4 % (ref 11.6–15.1)
GFR SERPL CREATININE-BSD FRML MDRD: 65 ML/MIN/1.73SQ M
GLUCOSE SERPL-MCNC: 120 MG/DL (ref 65–140)
GLUCOSE SERPL-MCNC: 131 MG/DL (ref 65–140)
GLUCOSE SERPL-MCNC: 155 MG/DL (ref 65–140)
GLUCOSE SERPL-MCNC: 172 MG/DL (ref 65–140)
GLUCOSE SERPL-MCNC: 220 MG/DL (ref 65–140)
HCT VFR BLD AUTO: 24.6 % (ref 36.5–49.3)
HGB BLD-MCNC: 7.5 G/DL (ref 12–17)
IMM GRANULOCYTES # BLD AUTO: 0.13 THOUSAND/UL (ref 0–0.2)
IMM GRANULOCYTES NFR BLD AUTO: 1 % (ref 0–2)
LYMPHOCYTES # BLD AUTO: 1.43 THOUSANDS/ÂΜL (ref 0.6–4.47)
LYMPHOCYTES NFR BLD AUTO: 12 % (ref 14–44)
MCH RBC QN AUTO: 25.3 PG (ref 26.8–34.3)
MCHC RBC AUTO-ENTMCNC: 30.5 G/DL (ref 31.4–37.4)
MCV RBC AUTO: 83 FL (ref 82–98)
MONOCYTES # BLD AUTO: 1.29 THOUSAND/ÂΜL (ref 0.17–1.22)
MONOCYTES NFR BLD AUTO: 11 % (ref 4–12)
NEUTROPHILS # BLD AUTO: 8.81 THOUSANDS/ÂΜL (ref 1.85–7.62)
NEUTS SEG NFR BLD AUTO: 76 % (ref 43–75)
NRBC BLD AUTO-RTO: 0 /100 WBCS
PLATELET # BLD AUTO: 351 THOUSANDS/UL (ref 149–390)
PMV BLD AUTO: 9.6 FL (ref 8.9–12.7)
POTASSIUM SERPL-SCNC: 4.1 MMOL/L (ref 3.5–5.3)
PROCALCITONIN SERPL-MCNC: 0.46 NG/ML
PROT SERPL-MCNC: 6.6 G/DL (ref 6.4–8.4)
RBC # BLD AUTO: 2.97 MILLION/UL (ref 3.88–5.62)
SODIUM SERPL-SCNC: 131 MMOL/L (ref 135–147)
WBC # BLD AUTO: 11.69 THOUSAND/UL (ref 4.31–10.16)

## 2023-02-15 RX ADMIN — HEPARIN SODIUM 5000 UNITS: 5000 INJECTION INTRAVENOUS; SUBCUTANEOUS at 14:57

## 2023-02-15 RX ADMIN — SODIUM CHLORIDE 75 ML/HR: 0.9 INJECTION, SOLUTION INTRAVENOUS at 23:03

## 2023-02-15 RX ADMIN — OXYCODONE HYDROCHLORIDE 5 MG: 5 TABLET ORAL at 21:21

## 2023-02-15 RX ADMIN — INSULIN GLARGINE 5 UNITS: 100 INJECTION, SOLUTION SUBCUTANEOUS at 21:21

## 2023-02-15 RX ADMIN — SIMETHICONE 80 MG: 80 TABLET, CHEWABLE ORAL at 18:00

## 2023-02-15 RX ADMIN — PANTOPRAZOLE SODIUM 40 MG: 40 TABLET, DELAYED RELEASE ORAL at 10:00

## 2023-02-15 RX ADMIN — SIMETHICONE 80 MG: 80 TABLET, CHEWABLE ORAL at 10:00

## 2023-02-15 RX ADMIN — SODIUM CHLORIDE, PRESERVATIVE FREE 10 ML: 5 INJECTION INTRAVENOUS at 10:30

## 2023-02-15 RX ADMIN — ATORVASTATIN CALCIUM 40 MG: 40 TABLET, FILM COATED ORAL at 10:00

## 2023-02-15 RX ADMIN — HYDROMORPHONE HYDROCHLORIDE 0.2 MG: 0.2 INJECTION, SOLUTION INTRAMUSCULAR; INTRAVENOUS; SUBCUTANEOUS at 23:02

## 2023-02-15 RX ADMIN — TAMSULOSIN HYDROCHLORIDE 0.4 MG: 0.4 CAPSULE ORAL at 18:00

## 2023-02-15 RX ADMIN — DULOXETINE HYDROCHLORIDE 30 MG: 30 CAPSULE, DELAYED RELEASE ORAL at 10:00

## 2023-02-15 RX ADMIN — COLLAGENASE SANTYL: 250 OINTMENT TOPICAL at 10:25

## 2023-02-15 RX ADMIN — INSULIN LISPRO 2 UNITS: 100 INJECTION, SOLUTION INTRAVENOUS; SUBCUTANEOUS at 12:06

## 2023-02-15 RX ADMIN — INSULIN LISPRO 4 UNITS: 100 INJECTION, SOLUTION INTRAVENOUS; SUBCUTANEOUS at 18:00

## 2023-02-15 RX ADMIN — INSULIN LISPRO 1 UNITS: 100 INJECTION, SOLUTION INTRAVENOUS; SUBCUTANEOUS at 21:22

## 2023-02-15 RX ADMIN — PANTOPRAZOLE SODIUM 40 MG: 40 TABLET, DELAYED RELEASE ORAL at 18:00

## 2023-02-15 RX ADMIN — SIMETHICONE 80 MG: 80 TABLET, CHEWABLE ORAL at 21:21

## 2023-02-15 RX ADMIN — SODIUM CHLORIDE 75 ML/HR: 0.9 INJECTION, SOLUTION INTRAVENOUS at 10:31

## 2023-02-15 RX ADMIN — INSULIN LISPRO 4 UNITS: 100 INJECTION, SOLUTION INTRAVENOUS; SUBCUTANEOUS at 12:05

## 2023-02-15 RX ADMIN — SIMETHICONE 80 MG: 80 TABLET, CHEWABLE ORAL at 12:05

## 2023-02-15 RX ADMIN — VANCOMYCIN HYDROCHLORIDE 1000 MG: 1 INJECTION, SOLUTION INTRAVENOUS at 21:21

## 2023-02-15 RX ADMIN — ASPIRIN 81 MG 81 MG: 81 TABLET ORAL at 10:00

## 2023-02-15 RX ADMIN — HEPARIN SODIUM 5000 UNITS: 5000 INJECTION INTRAVENOUS; SUBCUTANEOUS at 05:09

## 2023-02-15 NOTE — PROGRESS NOTES
[address] [facility]  Progress Note - [place] 1965, 61 y o  male MRN: [id number]  Unit/Bed#: -Shawn Encounter: [id number]  Primary Care Provider: Homer Sierra MD   Date and time admitted to hospital: 2/7/2023  3:01 PM    * Sepsis [facility])  Assessment & Plan  Noted the development of foul-smelling drainage and increasing pain from his chronic abdominal wound  · As evidenced by tachycardia, tachypnea, and leukocytosis  · In setting of purulent abdominal infection   · Hx of colon CA w/ extensive abd surgical hx as below   · Patient has several abdominal drains in place, with CT showing improvement in the fluid collections at the left liver lobe, perisplenic area, and the left retroperitoneum inferior to the spleen  · Completed IV Ertapenem, now on IV Vancomycin to complete through 2/16 based on wound cultures - now growing ESBL and MRSA  · Trend WBC; follow-up with pending infectious labs and cultures  · ID consulted; recommendations appreciated   · S/p bedside midline wound debridement + wet dressing 2/8 with general surgery      Nephrolithiasis  Assessment & Plan  RUQ US revealed 7 mm nonobstructing right intrarenal calculus  No hydronephrosis noted on CT a/p  Asymptomatic at this time     · Flomax  · Strain urine  · Intake and output    Hyponatremia  Assessment & Plan  · Sodium 129 on ED presentation, improving  · Suspected to be in the setting of dehydration/poor oral intake, likely SIADH in the setting of malignancy also contributing  · AM BMP recheck     Abnormal CT scan  Assessment & Plan  CT shows interval decrease in bilateral pleural effusions, however multiple small bilateral lung nodules are seen which are concerning for possible metastatic lung disease  · Patient reports having prior known lung nodules  · Oxygenating appropriately on room air; continue to monitor oxygenation  · Outpatient f/u    Acute on chronic kidney failure [facility])  Assessment & Plan  Lab Results   Component Value Date    EGFR 54 02/14/2023    EGFR 63 02/13/2023    EGFR 75 02/12/2023    CREATININE 1 41 (H) 02/14/2023    CREATININE 1 25 02/13/2023    CREATININE 1 08 02/12/2023     · Creatinine 2 5 to; baseline 1 0, improved  However noted slight increase  · Suspecting possible multifactorial cause in setting of dehydration and sepsis  Also likely Vancomycin contributing  · Avoid nephrotoxic agents    Colon cancer metastasized to liver [facility])  Assessment & Plan  Follows w/ outpatient [facility] onc and palliative care  Extensive abdominal surgical history (11/7 ex lap w/ section 3 liver resection, and development of left upper quadrant hematoma and suspected leak from transverse colon anastomosis complication; 79/94 right hemicolectomy; 11/17 re-exploration w/partial descending colectomy; 12/8 IR percutaneous cholecystostomy tube and hepatectomy abscess drainage; 12/20 IR drains placed for perisplenic abscess)  · Patient does not wish to follow with [facility] oncology anymore as it is too far of a drive; would like to establish with [facility] oncology (already follows with Dr Les Jung)  · Continue outpatient f/u    Benign essential hypertension  Assessment & Plan  BP stable  · Continue prehospital HTN medications  · Monitor BP per unit protocol    Type 2 diabetes mellitus without complication, with long-term current use of insulin (HCC)  Assessment & Plan    Lab Results   Component Value Date    HGBA1C 8 0 (H) 09/26/2022     · Home Lantus 8U qHS and humalog 4U TID w/meals   · Dose reduce to 5U qhs + 4 U TID   · 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  · Hypoglycemia protocol  · Diabetic diet       VTE Pharmacologic Prophylaxis: VTE Score: 6 High Risk (Score >/= 5) - Pharmacological DVT Prophylaxis Ordered: heparin  Sequential Compression Devices Ordered  Patient Centered Rounds: I performed bedside rounds with nursing staff today    Discussions with Specialists or Other Care Team Provider: case management, ID    Education and Discussions with Family / Patient: Updated  (wife) via phone  Total Time Spent on Date of Encounter in care of patient: 45 minutes This time was spent on one or more of the following: performing physical exam; counseling and coordination of care; obtaining or reviewing history; documenting in the medical record; reviewing/ordering tests, medications or procedures; communicating with other healthcare professionals and discussing with patient's family/caregivers  Current Length of Stay: 8 day(s)  Current Patient Status: Inpatient   Certification Statement: The patient will continue to require additional inpatient hospital stay due to IV antibiotics, pending placement  Discharge Plan: likely in the next 48-72 hours    Code Status: Level 1 - Full Code    Subjective:   No fevers/chill overnight  No other complaints endorsed at this time  Prefers to go to [facility] once discharged  Objective:     Vitals:   Temp (24hrs), Av 1 °F (36 7 °C), Min:97 8 °F (36 6 °C), Max:98 5 °F (36 9 °C)    Temp:  [97 8 °F (36 6 °C)-98 5 °F (36 9 °C)] 97 8 °F (36 6 °C)  HR:  [72-91] 85  Resp:  [18] 18  BP: ()/(64-99) 114/99  SpO2:  [98 %-100 %] 99 %  Body mass index is 27 98 kg/m²  Input and Output Summary (last 24 hours): Intake/Output Summary (Last 24 hours) at 2/15/2023 1337  Last data filed at 2023 2254  Gross per 24 hour   Intake 1000 ml   Output 210 ml   Net 790 ml       Physical Exam:   Physical Exam  Vitals and nursing note reviewed  Constitutional:       General: He is not in acute distress  Appearance: He is well-developed  He is ill-appearing (chronically)  He is not toxic-appearing  HENT:      Head: Normocephalic and atraumatic  Nose: Nose normal       Mouth/Throat:      Mouth: Mucous membranes are moist       Pharynx: Oropharynx is clear  Eyes:      Conjunctiva/sclera: Conjunctivae normal       Pupils: Pupils are equal, round, and reactive to light  Neck:      Vascular: No JVD  Cardiovascular:      Rate and Rhythm: Normal rate and regular rhythm  Pulses: Normal pulses  Pulmonary:      Effort: Pulmonary effort is normal  No respiratory distress  Breath sounds: Normal breath sounds  Abdominal:      General: Bowel sounds are normal       Palpations: Abdomen is soft  Tenderness: There is no abdominal tenderness  There is no guarding  Comments: (+) VICTORINO drains x2   Musculoskeletal:         General: No swelling, tenderness or deformity  Normal range of motion  Cervical back: Normal range of motion and neck supple  No rigidity  Lymphadenopathy:      Cervical: No cervical adenopathy  Skin:     General: Skin is warm and dry  Findings: No erythema  Neurological:      General: No focal deficit present  Mental Status: He is alert and oriented to person, place, and time  Mental status is at baseline  Motor: No abnormal muscle tone  Psychiatric:         Mood and Affect: Mood normal          Thought Content: Thought content normal          Additional Data:     Labs:  Results from last 7 days   Lab Units 02/14/23  0519 02/12/23  0459 02/11/23  0553   WBC Thousand/uL 11 12*   < > 8 10   HEMOGLOBIN g/dL 7 1*   < > 7 4*   HEMATOCRIT % 23 2*   < > 23 9*   PLATELETS Thousands/uL 325   < > 273   NEUTROS PCT %  --   --  77*   LYMPHS PCT %  --   --  10*   MONOS PCT %  --   --  10   EOS PCT %  --   --  0    < > = values in this interval not displayed       Results from last 7 days   Lab Units 02/14/23  0519 02/13/23  0505 02/12/23  1015   SODIUM mmol/L 132*   < > 130*   POTASSIUM mmol/L 3 9   < > 3 8   CHLORIDE mmol/L 99   < > 97   CO2 mmol/L 23   < > 25   BUN mg/dL 21   < > 15   CREATININE mg/dL 1 41*   < > 1 08   ANION GAP mmol/L 10   < > 8   CALCIUM mg/dL 7 7*   < > 7 9*   ALBUMIN g/dL  --   --  1 2*   TOTAL BILIRUBIN mg/dL  --   --  0 53   ALK PHOS U/L  --   --  843*   ALT U/L  --   --  14   AST U/L  --   --  49*   GLUCOSE RANDOM mg/dL 101   < > 212*    < > = values in this interval not displayed  Results from last 7 days   Lab Units 02/15/23  1114 02/15/23  0721 02/14/23  2058 02/14/23  1609 02/14/23  1149 02/14/23  0821 02/13/23  2107 02/13/23  1726 02/13/23  1119 02/13/23  0746 02/12/23  2117 02/12/23  1112   POC GLUCOSE mg/dl 220* 120 121 157* 164* 110 154* 147* 196* 121 96 303*         Results from last 7 days   Lab Units 02/13/23  0505 02/11/23  0512 02/10/23  0955   LACTIC ACID mmol/L  --   --  0 7   PROCALCITONIN ng/ml 0 75* 0 61*  --        Lines/Drains:  Invasive Devices     Central Venous Catheter Line  Duration           Port A Cath 03/10/22 Right Chest 342 days          Drain  Duration           Ileostomy LUQ 89 days    Cholecystostomy Tube 64 days    Abscess Drain Abdomen 33 days                Central Line:  Goal for removal: N/A - Chronic PICC             Imaging: No pertinent imaging reviewed      Recent Cultures (last 7 days):         Last 24 Hours Medication List:   Current Facility-Administered Medications   Medication Dose Route Frequency Provider Last Rate   • acetaminophen  650 mg Oral Q4H PRN Ldstone Rodriguez PA-C     • amLODIPine  2 5 mg Oral Daily [facility]     • aspirin  81 mg Oral Daily [place]     • atorvastatin  40 mg Oral Daily [place]     • collagenase   Topical Daily Luann Lopez PA-C     • DULoxetine  30 mg Oral Daily [place]     • heparin (porcine)  5,000 Units Subcutaneous [facility]     • HYDROmorphone  0 2 mg Intravenous Q4H PRN Nelia Ambrosio DO     • influenza vaccine  0 5 mL Intramuscular Prior to discharge Genie Washburn MD     • insulin glargine  5 Units Subcutaneous HS Kiara Wang PA-C     • insulin lispro  1-6 Units Subcutaneous TID [facility]     • insulin lispro  1-6 Units Subcutaneous HS [facility]     • insulin lispro  4 Units Subcutaneous TID With Meals Ld Rodriguez PA-C     • melatonin  6 mg Oral HS PRN Jiles Osgood, PA-C     • methocarbamol  500 mg Oral BID PRN Vivienne Correa PA-C     • ondansetron  4 mg Intravenous Q4H PRN Nelia Kenzie Saraiya, DO     • oxyCODONE  5 mg Oral Q4H PRN Nelia Kenzie Saraiya, DO     • oxyCODONE  2 5 mg Oral Q4H PRN Nelia Kenzie Saraiya, DO     • pantoprazole  40 mg Oral BID Vivienne Correa PA-C     • pneumococcal 13-valent conjugate vaccine  0 5 mL Intramuscular Prior to discharge Louie Lucas MD     • simethicone  80 mg Oral 4x Daily (with meals and at bedtime) Vivienne Correa PA-C     • sodium chloride (PF)  10 mL Intravenous Daily Nelia Kenzie Saraiya, DO     • sodium chloride  75 mL/hr Intravenous Continuous Vincenzo Howard MD 75 mL/hr (02/15/23 1031)   • tamsulosin  0 4 mg Oral Daily With [address] [facility]     • vancomycin  1,000 mg Intravenous Q24H Nelia Kenzie Saraiya, DO 1,000 mg (02/14/23 2124)        Today, Patient Was Seen By: Vincenzo Howard MD    **Please Note: This note may have been constructed using a voice recognition system  **

## 2023-02-15 NOTE — CASE MANAGEMENT
Case Management Discharge Planning Note    Patient name Rudene Record  Location /-51 MRN [id number]  : 1965 Date 2/15/2023       Current Admission Date: 2023  Current Admission Diagnosis:Sepsis [facility])   Patient Active Problem List    Diagnosis Date Noted   • Nephrolithiasis 2023   • Abnormal CT scan 2023   • Hyperkalemia 2023   • Hyponatremia 2023   • Dehiscence of incision 2022   • Elevated alkaline phosphatase level 2022   • Leukocytosis 2022   • Abscess 2022   • Acute pain 2022   • Sepsis ([facility] ) 2022   • Severe protein-calorie malnutrition ([facility] ) 2022   • Acute on chronic kidney failure ([facility] ) 2022   • MR (mitral regurgitation) 2022   • ESBL (extended spectrum beta-lactamase) producing bacteria infection 2022   • Encephalopathy 2022   • Cervical radiculopathy 10/26/2022   • Other fatigue 06/15/2022   • Colostomy prolapse ([facility] ) 2022   • Colon cancer metastasized to liver ([facility] ) 2022   • 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 [facility] 2022   • Iron deficiency anemia, unspecified 2022   • Malignant neoplasm of transverse colon ([facility] ) 2022   • Thrombocytosis 2022   • Hypokalemia 2022   • Transaminitis 2022   • Type 2 diabetes mellitus with hyperlipidemia ([address] ) 2022   • Microcytic anemia 2022   • Left ureteral calculus 2020   • Incarcerated umbilical hernia    • Testicular hypogonadism 2017   • Low testosterone 2017   • Type 2 diabetes mellitus without complication, with long-term current use of insulin ([facility] ) 2016   • Benign essential hypertension 2016   • Mixed hyperlipidemia 2016   • Erectile dysfunction 2016   • Obesity (BMI 30-39 9) 2016      LOS (days): 8  Geometric Mean LOS (GMLOS) (days):   Days to GMLOS:     OBJECTIVE:  Risk of Unplanned Readmission Score: 43 58      Current admission status: Inpatient   Preferred Pharmacy:   [facility] - [address] [facility] [place] [address]  Phone: [phone] Fax: 209 [address] - e [address]tw[address] [facility] [address] [address]  Phone: [phone] Fax: [address] [place], [place] - [address] [place] [address]  Phone: [phone] Fax: [phone]    Primary Care Provider: Goyo Lucas MD    Primary Insurance: "[employer]NA  Secondary Insurance:     DISCHARGE DETAILS:    Discharge planning discussed with[de-identified] Patient at bedside  Freedom of Choice: Yes  Comments - Freedom of Choice: DCP reviewed  Patient preference is [facility] placement [facility] or [facility]  [facility] declined due to medical complexity  [facility] pending additional review, however they have no beds available through Friday and cannot secure through auth process  Patient concerned about returning to prior STR placement and was concerned it was [facility]  CM review records, patient had previously discharged to [facility] which he did not like  Patient agreeable to d/c to [facility] for [facility] placement  ARC initiates own [facility]    CM contacted family/caregiver?: Yes  Were Treatment Team discharge recommendations reviewed with patient/caregiver?: Yes (As it pertains to case management role and d/c planning process )  Did patient/caregiver verbalize understanding of patient care needs?: N/A- going to facility  Were patient/caregiver advised of the risks associated with not following Treatment Team discharge recommendations?: Yes (As it pertains to case management role and d/c planning process )    Contacts  Patient Contacts: Marya Solders (spouse)  Relationship to Patient[de-identified] Family  Contact Method: Phone  Phone Number: [phone]  Reason/Outcome: Continuity of Care, Discharge [address]         Is the patient interested in [address] at discharge?: No    DME Referral Provided  Referral made for DME?: No    Other Referral/Resources/Interventions Provided:  Interventions: Acute Rehab  Referral Comments: [facility] reserved in [address] for acute rehab      Would you like to participate in our [address] service program?  : No - Declined    Treatment Team Recommendation: Acute Rehab  Discharge Destination Plan[de-identified] Acute Rehab  Transport at Discharge : [facility]     Number/Name of Dispatcher: [phone] (Date): 02/16/23  ETA of Transport (Time): [address] Name, [address] : [address] [facility],  [facility], [address]  [address]  Receiving Facility/Agency Phone Number: Phone: [phone]  Facility/Agency Fax Number: Fax: [phone]

## 2023-02-15 NOTE — PROGRESS NOTES
Satish Qiu is a 61 y o  male who is currently ordered Vancomycin IV with management by the Pharmacy Consult service  Relevant clinical data and objective / subjective history reviewed  Vancomycin Assessment:  Indication and Goal AUC/Trough: Other, Intra-abdominal sepsis with possible wound infection, -600, trough >10  Clinical Status: stable  Micro:   No new results  Renal Function:  SCr: 1 41 mg/dL  CrCl: 63 6 mL/min  Renal replacement: Not on dialysis  Days of Therapy: 6  Current Dose: 1000mg IV q24h  Vancomycin Plan:  New Dosing: per ID continue through 2/16/23 to complete 7 day course  Estimated AUC: 477 mcg*hr/mL  Estimated Trough: 15 mcg/mL  Next Level: complete 7 day course on 2/16/23  Renal Function Monitoring: Daily ELIANE and [place] will continue to follow closely for s/sx of nephrotoxicity, infusion reactions and appropriateness of therapy  BMP and CBC will be ordered per protocol  We will continue to follow the patient’s culture results and clinical progress daily      Ej Hargrove, Pharmacist

## 2023-02-15 NOTE — PROGRESS NOTES
Progress Note - Infectious Disease   Gene Lynn 61 y o  male MRN: [id number]  Unit/Bed#: -01 Encounter: [id number]      Impression/Plan:  1   Systemic inflammatory response syndrome   Leukocytosis and tachycardia   Present on admission   Possible sepsis without a well-defined source   The patient CT chest abdomen and pelvis does not reveal any new source of sepsis   Urinalysis nondiagnostic for UTI   The abdominal wound has been debrided   Unclear significance of the extremely high alkaline phosphatase level with the total bilirubin normal and the remainder of the transaminases not proportionally high   Consideration for the possibility of bacteremia   Right upper quadrant ultrasound without biliary ductal dilatation   Wound culture with ESBL and MRSA   No recurrence of the fever and the white cell count has come down substantially  Patient completed more than a week of treatment for the ESBL and now remains on treatment for the MRSA  -Continue vancomycin through tomorrow to complete 7 days of treatment  -Pharmacy follow-up for vancomycin dose management  -No additional ertapenem  -Surgery follow-up  -Recheck CBC with differential, CMP, procalcitonin level  -Source control measures as below  -Supportive care     2   Intra-abdominal abscesses   Status post IR drainage with drains remaining in place but with improved findings on repeat CT scan   Patient completed several weeks of intravenous antibiotics   No evidence of a new abscess   -Antibiotics as above for now  -Surgery follow-up  -Serial exams     3   ESBL E  coli bacteremia/abscess formation   Status post prolonged course of intravenous antibiotics with clearance of the bacteremia   Abscess is improved as above  -No directed antibiotics for this problem  -Drain management     4   Abdominal wound   No overt cellulitis or purulence seen although there is some exudate   Possible mild wound infection that is status post debridement by surgery   ESBL and MRSA  -Local wound care  -Serial abdominal exams  -Surgery follow-up  -Antibiotics as above     5   Metastatic colon cancer   Status post extensive surgical resection with reexploration in the setting of complication     6   Acute kidney injury   Suspect secondary to prerenal issues   No other clear source appreciated   Renal function improving and now stabilized  Mildly worsened renal function but no repeat labs today  -Recheck BMP  -Volume management  -Dose adjust the antibiotics as needed    Discussed the above antibiotic management plan in detail with the primary service and they agree with the plan    Antibiotics:  Vancomycin 6  Antibiotics 9    Subjective:  Patient has no fever, chills, sweats; no nausea, vomiting, diarrhea; no cough, shortness of breath; no pain  No new symptoms  He is very tired this morning  Objective:  Vitals:  Temp:  [98 1 °F (36 7 °C)-98 5 °F (36 9 °C)] 98 1 °F (36 7 °C)  HR:  [72-96] 87  Resp:  [18] 18  BP: ()/(64-76) 100/64  SpO2:  [96 %-99 %] 99 %  Temp (24hrs), Av 2 °F (36 8 °C), Min:98 1 °F (36 7 °C), Max:98 5 °F (36 9 °C)  Current: Temperature: 98 1 °F (36 7 °C)    Physical Exam:   General Appearance:  Alert, interactive, nontoxic, no acute distress  Throat: Oropharynx moist without lesions  Lungs:   Clear to auscultation bilaterally; no wheezes, rhonchi or rales; respirations unlabored   Heart:  RRR; no murmur, rub or gallop   Abdomen:   Soft, non-tender, non-distended, positive bowel sounds  Ostomy with output  Right-sided biliary drain in place with output  Left-sided VICTORINO drain in place  Midline wound dressed with a dry dressing in place  Extremities: No clubbing, cyanosis or edema   Skin: No new rashes or lesions  No draining wounds noted         Labs, Imaging, & Other studies:   All pertinent labs and imaging studies were personally reviewed  Results from last 7 days   Lab Units 23  0519 23  1827 23  0505 23  0459   WBC Thousand/uL 11 12*  --  9 71 9 12   HEMOGLOBIN g/dL 7 1* 7 4* 7 1* 7 2*   PLATELETS Thousands/uL 325  --  316 304     Results from last 7 days   Lab Units 02/14/23  0519 02/13/23  0505 02/12/23  1015 02/10/23  0511 02/09/23  0635   SODIUM mmol/L 132* 133* 130*   < > 134*   POTASSIUM mmol/L 3 9 3 9 3 8   < > 4 4   CHLORIDE mmol/L 99 99 97   < > 103   CO2 mmol/L 23 25 25   < > 21   BUN mg/dL 21 20 15   < > 33*   CREATININE mg/dL 1 41* 1 25 1 08   < > 1 47*   EGFR ml/min/1 73sq m 54 63 75   < > 51   CALCIUM mg/dL 7 7* 7 9* 7 9*   < > 8 4   AST U/L  --   --  49*  --  59*   ALT U/L  --   --  14  --  17   ALK PHOS U/L  --   --  843*  --  925*    < > = values in this interval not displayed           Results from last 7 days   Lab Units 02/13/23  0505 02/11/23  0512   PROCALCITONIN ng/ml 0 75* 0 61*         Results from last 7 days   Lab Units 02/13/23  0505   FERRITIN ng/mL 2,911*

## 2023-02-15 NOTE — PLAN OF CARE
Problem: PAIN - ADULT  Goal: Verbalizes/displays adequate comfort level or baseline comfort level  Description: Interventions:  - Encourage patient to monitor pain and request assistance  - Assess pain using appropriate pain scale  - Administer analgesics based on type and severity of pain and evaluate response  - Implement non-pharmacological measures as appropriate and evaluate response  - Consider cultural and social influences on pain and pain management  - Notify physician/advanced practitioner if interventions unsuccessful or patient reports new pain  Outcome: Progressing     Problem: INFECTION - ADULT  Goal: Absence or prevention of progression during hospitalization  Description: INTERVENTIONS:  - Assess and monitor for signs and symptoms of infection  - Monitor lab/diagnostic results  - Monitor all insertion sites, i e  indwelling lines, tubes, and drains  - Monitor endotracheal if appropriate and nasal secretions for changes in amount and color  - [place] appropriate cooling/warming therapies per order  - Administer medications as ordered  - Instruct and encourage patient and family to use good hand hygiene technique  - Identify and instruct in appropriate isolation precautions for identified infection/condition  Outcome: Progressing  Goal: Absence of fever/infection during neutropenic period  Description: INTERVENTIONS:  - Monitor WBC    Outcome: Progressing     Problem: SAFETY ADULT  Goal: Patient will remain free of falls  Description: INTERVENTIONS:  - Educate patient/family on patient safety including physical limitations  - Instruct patient to call for assistance with activity   - Consult OT/PT to assist with strengthening/mobility   - Keep Call bell within reach  - Keep bed low and locked with side rails adjusted as appropriate  - Keep care items and personal belongings within reach  - Initiate and maintain comfort rounds  - Make Fall Risk Sign visible to staff  - Offer Toileting every  Hours, in advance of need  - Initiate/Maintain alarm  - Obtain necessary fall risk management equipment:   - Apply yellow socks and bracelet for high fall risk patients  - Consider moving patient to room near nurses station  Outcome: Progressing  Goal: Maintain or return to baseline ADL function  Description: INTERVENTIONS:  -  Assess patient's ability to carry out ADLs; assess patient's baseline for ADL function and identify physical deficits which impact ability to perform ADLs (bathing, care of mouth/teeth, toileting, grooming, dressing, etc )  - Assess/evaluate cause of self-care deficits   - Assess range of motion  - Assess patient's mobility; develop plan if impaired  - Assess patient's need for assistive devices and provide as appropriate  - Encourage maximum independence but intervene and supervise when necessary  - Involve family in performance of ADLs  - Assess for home care needs following discharge   - Consider OT consult to assist with ADL evaluation and planning for discharge  - Provide patient education as appropriate  Outcome: Progressing  Goal: Maintains/Returns to pre admission functional level  Description: INTERVENTIONS:  - Perform BMAT or MOVE assessment daily    - Set and communicate daily mobility goal to care team and patient/family/caregiver  - Collaborate with rehabilitation services on mobility goals if consulted  - Perform Range of Motion  times a day  - Reposition patient every  hours    - Dangle patient  times a day  - Stand patient  times a day  - Ambulate patient  times a day  - Out of bed to chair  times a day   - Out of bed for meals  times a day  - Out of bed for toileting  - Record patient progress and toleration of activity level   Outcome: Progressing     Problem: DISCHARGE PLANNING  Goal: Discharge to home or other facility with appropriate resources  Description: INTERVENTIONS:  - Identify barriers to discharge w/patient and caregiver  - Arrange for needed discharge resources and transportation as appropriate  - Identify discharge learning needs (meds, wound care, etc )  - Arrange for interpretive services to assist at discharge as needed  - Refer to Case Management Department for coordinating discharge planning if the patient needs post-hospital services based on physician/advanced practitioner order or complex needs related to functional status, cognitive ability, or social support system  Outcome: Progressing     Problem: Knowledge Deficit  Goal: Patient/family/caregiver demonstrates understanding of disease process, treatment plan, medications, and discharge instructions  Description: Complete learning assessment and assess knowledge base    Interventions:  - Provide teaching at level of understanding  - Provide teaching via preferred learning methods  Outcome: Progressing     Problem: SKIN/TISSUE INTEGRITY - ADULT  Goal: Skin Integrity remains intact(Skin Breakdown Prevention)  Description: Assess:  -Perform Michael assessment every   -Clean and moisturize skin every   -Inspect skin when repositioning, toileting, and assisting with ADLS  -Assess under medical devices such as  every   -Assess extremities for adequate circulation and sensation     Bed Management:  -Have minimal linens on bed & keep smooth, unwrinkled  -Change linens as needed when moist or perspiring  -Avoid sitting or lying in one position for more than  hours while in bed  -Keep HOB at degrees     Toileting:  -Offer bedside commode  -Assess for incontinence every   -Use incontinent care products after each incontinent episode such as     Activity:  -Mobilize patient  times a day  -Encourage activity and walks on unit  -Encourage or provide ROM exercises   -Turn and reposition patient every  Hours  -Use appropriate equipment to lift or move patient in bed  -Instruct/ Assist with weight shifting every  when out of bed in chair  -Consider limitation of chair time  hour intervals    Skin Care:  -Avoid use of baby powder, tape, friction and shearing, hot water or constrictive clothing  -Relieve pressure over bony prominences using   -Do not massage red bony areas    Next Steps:  -Teach patient strategies to minimize risks such as    -Consider consults to  interdisciplinary teams such as   Outcome: Progressing  Goal: Incision(s), wounds(s) or drain site(s) healing without S/S of infection  Description: INTERVENTIONS  - Assess and document dressing, incision, wound bed, drain sites and surrounding tissue  - Provide patient and family education  - Perform skin care/dressing changes every   Outcome: Progressing  Goal: Pressure injury heals and does not worsen  Description: Interventions:  - Implement low air loss mattress or specialty surface (Criteria met)  - Apply silicone foam dressing  - Instruct/assist with weight shifting every  minutes when in chair   - Limit chair time to  hour intervals  - Use special pressure reducing interventions such as  when in chair   - Apply fecal or urinary incontinence containment device   - Perform passive or active ROM every   - Turn and reposition patient & offload bony prominences every  hours   - Utilize friction reducing device or surface for transfers   - Consider consults to  interdisciplinary teams such as   - Use incontinent care products after each incontinent episode such as   - Consider nutrition services referral as needed  Outcome: Progressing     Problem: Prexisting or High Potential for Compromised Skin Integrity  Goal: Skin integrity is maintained or improved  Description: INTERVENTIONS:  - Identify patients at risk for skin breakdown  - Assess and monitor skin integrity  - Assess and monitor nutrition and hydration status  - Monitor labs   - Assess for incontinence   - Turn and reposition patient  - Assist with mobility/ambulation  - Relieve pressure over bony prominences  - Avoid friction and shearing  - Provide appropriate hygiene as needed including keeping skin clean and dry  - Evaluate need for skin moisturizer/barrier cream  - Collaborate with interdisciplinary team   - Patient/family teaching  - Consider wound care consult   Outcome: Progressing     Problem: MOBILITY - ADULT  Goal: Maintain or return to baseline ADL function  Description: INTERVENTIONS:  -  Assess patient's ability to carry out ADLs; assess patient's baseline for ADL function and identify physical deficits which impact ability to perform ADLs (bathing, care of mouth/teeth, toileting, grooming, dressing, etc )  - Assess/evaluate cause of self-care deficits   - Assess range of motion  - Assess patient's mobility; develop plan if impaired  - Assess patient's need for assistive devices and provide as appropriate  - Encourage maximum independence but intervene and supervise when necessary  - Involve family in performance of ADLs  - Assess for home care needs following discharge   - Consider OT consult to assist with ADL evaluation and planning for discharge  - Provide patient education as appropriate  Outcome: Progressing  Goal: Maintains/Returns to pre admission functional level  Description: INTERVENTIONS:  - Perform BMAT or MOVE assessment daily    - Set and communicate daily mobility goal to care team and patient/family/caregiver  - Collaborate with rehabilitation services on mobility goals if consulted  - Perform Range of Motion  times a day  - Reposition patient every  hours    - Dangle patient  times a day  - Stand patient  times a day  - Ambulate patient  times a day  - Out of bed to chair  times a day   - Out of bed for meals  times a day  - Out of bed for toileting  - Record patient progress and toleration of activity level   Outcome: Progressing

## 2023-02-15 NOTE — [facility] ADMISSION
After review with [facility] physicians, patient is denied for formerly [facility] due to being too medically complex for that [facility] location  Physician for [facility] AND [facility] would like a consult placed with Dr Reynaldo Casiano (PM&R) because he is familiar with this patient  CM made aware of same  [facility] is continuing to follow with no determination at this time

## 2023-02-15 NOTE — ASSESSMENT & PLAN NOTE
Follows w/ outpatient [facility] onc and palliative care   Extensive abdominal surgical history (11/7 ex lap w/ section 3 liver resection, and development of left upper quadrant hematoma and suspected leak from transverse colon anastomosis complication; 15/34 right hemicolectomy; 11/17 re-exploration w/partial descending colectomy; 12/8 IR percutaneous cholecystostomy tube and hepatectomy abscess drainage; 12/20 IR drains placed for perisplenic abscess)  · Patient does not wish to follow with [facility] oncology anymore as it is too far of a drive; would like to establish with [facility] oncology (already follows with Dr Vasquez Salinas)  · Continue outpatient f/u

## 2023-02-15 NOTE — ASSESSMENT & PLAN NOTE
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

## 2023-02-15 NOTE — PLAN OF CARE
Problem: PAIN - ADULT  Goal: Verbalizes/displays adequate comfort level or baseline comfort level  Description: Interventions:  - Encourage patient to monitor pain and request assistance  - Assess pain using appropriate pain scale  - Administer analgesics based on type and severity of pain and evaluate response  - Implement non-pharmacological measures as appropriate and evaluate response  - Consider cultural and social influences on pain and pain management  - Notify physician/advanced practitioner if interventions unsuccessful or patient reports new pain  Outcome: Progressing     Problem: INFECTION - ADULT  Goal: Absence or prevention of progression during hospitalization  Description: INTERVENTIONS:  - Assess and monitor for signs and symptoms of infection  - Monitor lab/diagnostic results  - Monitor all insertion sites, i e  indwelling lines, tubes, and drains  - Monitor endotracheal if appropriate and nasal secretions for changes in amount and color  - [place] appropriate cooling/warming therapies per order  - Administer medications as ordered  - Instruct and encourage patient and family to use good hand hygiene technique  - Identify and instruct in appropriate isolation precautions for identified infection/condition  Outcome: Progressing  Goal: Absence of fever/infection during neutropenic period  Description: INTERVENTIONS:  - Monitor WBC    Outcome: Progressing     Problem: SAFETY ADULT  Goal: Patient will remain free of falls  Description: INTERVENTIONS:  - Educate patient/family on patient safety including physical limitations  - Instruct patient to call for assistance with activity   - Consult OT/PT to assist with strengthening/mobility   - Keep Call bell within reach  - Keep bed low and locked with side rails adjusted as appropriate  - Keep care items and personal belongings within reach  - Initiate and maintain comfort rounds  - Make Fall Risk Sign visible to staff  - Offer Toileting every  Hours, in advance of need  - Initiate/Maintain alarm  - Obtain necessary fall risk management equipment:   - Apply yellow socks and bracelet for high fall risk patients  - Consider moving patient to room near nurses station  Outcome: Progressing  Goal: Maintain or return to baseline ADL function  Description: INTERVENTIONS:  -  Assess patient's ability to carry out ADLs; assess patient's baseline for ADL function and identify physical deficits which impact ability to perform ADLs (bathing, care of mouth/teeth, toileting, grooming, dressing, etc )  - Assess/evaluate cause of self-care deficits   - Assess range of motion  - Assess patient's mobility; develop plan if impaired  - Assess patient's need for assistive devices and provide as appropriate  - Encourage maximum independence but intervene and supervise when necessary  - Involve family in performance of ADLs  - Assess for home care needs following discharge   - Consider OT consult to assist with ADL evaluation and planning for discharge  - Provide patient education as appropriate  Outcome: Progressing  Goal: Maintains/Returns to pre admission functional level  Description: INTERVENTIONS:  - Perform BMAT or MOVE assessment daily    - Set and communicate daily mobility goal to care team and patient/family/caregiver  - Collaborate with rehabilitation services on mobility goals if consulted  - Perform Range of Motion  times a day  - Reposition patient every  hours    - Dangle patient  times a day  - Stand patient  times a day  - Ambulate patient  times a day  - Out of bed to chair  times a day   - Out of bed for meals  times a day  - Out of bed for toileting  - Record patient progress and toleration of activity level   Outcome: Progressing     Problem: DISCHARGE PLANNING  Goal: Discharge to home or other facility with appropriate resources  Description: INTERVENTIONS:  - Identify barriers to discharge w/patient and caregiver  - Arrange for needed discharge resources and transportation as appropriate  - Identify discharge learning needs (meds, wound care, etc )  - Arrange for interpretive services to assist at discharge as needed  - Refer to Case Management Department for coordinating discharge planning if the patient needs post-hospital services based on physician/advanced practitioner order or complex needs related to functional status, cognitive ability, or social support system  Outcome: Progressing     Problem: Knowledge Deficit  Goal: Patient/family/caregiver demonstrates understanding of disease process, treatment plan, medications, and discharge instructions  Description: Complete learning assessment and assess knowledge base    Interventions:  - Provide teaching at level of understanding  - Provide teaching via preferred learning methods  Outcome: Progressing     Problem: SKIN/TISSUE INTEGRITY - ADULT  Goal: Skin Integrity remains intact(Skin Breakdown Prevention)  Description: Assess:  -Perform Michael assessment every   -Clean and moisturize skin every   -Inspect skin when repositioning, toileting, and assisting with ADLS  -Assess under medical devices such as  every   -Assess extremities for adequate circulation and sensation     Bed Management:  -Have minimal linens on bed & keep smooth, unwrinkled  -Change linens as needed when moist or perspiring  -Avoid sitting or lying in one position for more than  hours while in bed  -Keep HOB at degrees     Toileting:  -Offer bedside commode  -Assess for incontinence every   -Use incontinent care products after each incontinent episode such as     Activity:  -Mobilize patient  times a day  -Encourage activity and walks on unit  -Encourage or provide ROM exercises   -Turn and reposition patient every  Hours  -Use appropriate equipment to lift or move patient in bed  -Instruct/ Assist with weight shifting every  when out of bed in chair  -Consider limitation of chair time  hour intervals    Skin Care:  -Avoid use of baby powder, tape, friction and shearing, hot water or constrictive clothing  -Relieve pressure over bony prominences using   -Do not massage red bony areas    Next Steps:  -Teach patient strategies to minimize risks such as    -Consider consults to  interdisciplinary teams such as   Outcome: Progressing  Goal: Incision(s), wounds(s) or drain site(s) healing without S/S of infection  Description: INTERVENTIONS  - Assess and document dressing, incision, wound bed, drain sites and surrounding tissue  - Provide patient and family education  - Perform skin care/dressing changes every   Outcome: Progressing  Goal: Pressure injury heals and does not worsen  Description: Interventions:  - Implement low air loss mattress or specialty surface (Criteria met)  - Apply silicone foam dressing  - Instruct/assist with weight shifting every  minutes when in chair   - Limit chair time to  hour intervals  - Use special pressure reducing interventions such as  when in chair   - Apply fecal or urinary incontinence containment device   - Perform passive or active ROM every   - Turn and reposition patient & offload bony prominences every  hours   - Utilize friction reducing device or surface for transfers   - Consider consults to  interdisciplinary teams such as   - Use incontinent care products after each incontinent episode such as   - Consider nutrition services referral as needed  Outcome: Progressing     Problem: Prexisting or High Potential for Compromised Skin Integrity  Goal: Skin integrity is maintained or improved  Description: INTERVENTIONS:  - Identify patients at risk for skin breakdown  - Assess and monitor skin integrity  - Assess and monitor nutrition and hydration status  - Monitor labs   - Assess for incontinence   - Turn and reposition patient  - Assist with mobility/ambulation  - Relieve pressure over bony prominences  - Avoid friction and shearing  - Provide appropriate hygiene as needed including keeping skin clean and dry  - Evaluate need for skin moisturizer/barrier cream  - Collaborate with interdisciplinary team   - Patient/family teaching  - Consider wound care consult   Outcome: Progressing     Problem: MOBILITY - ADULT  Goal: Maintain or return to baseline ADL function  Description: INTERVENTIONS:  -  Assess patient's ability to carry out ADLs; assess patient's baseline for ADL function and identify physical deficits which impact ability to perform ADLs (bathing, care of mouth/teeth, toileting, grooming, dressing, etc )  - Assess/evaluate cause of self-care deficits   - Assess range of motion  - Assess patient's mobility; develop plan if impaired  - Assess patient's need for assistive devices and provide as appropriate  - Encourage maximum independence but intervene and supervise when necessary  - Involve family in performance of ADLs  - Assess for home care needs following discharge   - Consider OT consult to assist with ADL evaluation and planning for discharge  - Provide patient education as appropriate  Outcome: Progressing  Goal: Maintains/Returns to pre admission functional level  Description: INTERVENTIONS:  - Perform BMAT or MOVE assessment daily    - Set and communicate daily mobility goal to care team and patient/family/caregiver  - Collaborate with rehabilitation services on mobility goals if consulted  - Perform Range of Motion  times a day  - Reposition patient every  hours    - Dangle patient  times a day  - Stand patient  times a day  - Ambulate patie times a day  - Out of bed to chair  times a day   - Out of bed for meals  times a day  - Out of bed for toileting  - Record patient progress and toleration of activity level   Outcome: Progressing

## 2023-02-16 ENCOUNTER — APPOINTMENT (OUTPATIENT)
Dept: NON INVASIVE DIAGNOSTICS | Facility: HOSPITAL | Age: 58
End: 2023-02-16
Attending: RADIOLOGY

## 2023-02-16 PROBLEM — D64.9 ANEMIA: Status: ACTIVE | Noted: 2022-02-18

## 2023-02-16 LAB
ABO GROUP BLD: NORMAL
ANION GAP SERPL CALCULATED.3IONS-SCNC: 8 MMOL/L (ref 4–13)
BASOPHILS # BLD AUTO: 0.05 THOUSANDS/ÂΜL (ref 0–0.1)
BASOPHILS NFR BLD AUTO: 1 % (ref 0–1)
BLD GP AB SCN SERPL QL: NEGATIVE
BUN SERPL-MCNC: 19 MG/DL (ref 5–25)
CALCIUM SERPL-MCNC: 7.8 MG/DL (ref 8.3–10.1)
CHLORIDE SERPL-SCNC: 103 MMOL/L (ref 96–108)
CO2 SERPL-SCNC: 22 MMOL/L (ref 21–32)
CREAT SERPL-MCNC: 1.34 MG/DL (ref 0.6–1.3)
EOSINOPHIL # BLD AUTO: 0 THOUSAND/ÂΜL (ref 0–0.61)
EOSINOPHIL NFR BLD AUTO: 0 % (ref 0–6)
ERYTHROCYTE [DISTWIDTH] IN BLOOD BY AUTOMATED COUNT: 17.4 % (ref 11.6–15.1)
GFR SERPL CREATININE-BSD FRML MDRD: 57 ML/MIN/1.73SQ M
GLUCOSE SERPL-MCNC: 104 MG/DL (ref 65–140)
GLUCOSE SERPL-MCNC: 125 MG/DL (ref 65–140)
GLUCOSE SERPL-MCNC: 157 MG/DL (ref 65–140)
GLUCOSE SERPL-MCNC: 169 MG/DL (ref 65–140)
GLUCOSE SERPL-MCNC: 230 MG/DL (ref 65–140)
HCT VFR BLD AUTO: 16.9 % (ref 36.5–49.3)
HCT VFR BLD AUTO: 23.2 % (ref 36.5–49.3)
HGB BLD-MCNC: 6.8 G/DL (ref 12–17)
HGB BLD-MCNC: 8.1 G/DL (ref 12–17)
IMM GRANULOCYTES # BLD AUTO: 0.16 THOUSAND/UL (ref 0–0.2)
IMM GRANULOCYTES NFR BLD AUTO: 2 % (ref 0–2)
LYMPHOCYTES # BLD AUTO: 1.83 THOUSANDS/ÂΜL (ref 0.6–4.47)
LYMPHOCYTES NFR BLD AUTO: 18 % (ref 14–44)
MCH RBC QN AUTO: 24.6 PG (ref 26.8–34.3)
MCHC RBC AUTO-ENTMCNC: 29.3 G/DL (ref 31.4–37.4)
MCV RBC AUTO: 84 FL (ref 82–98)
MONOCYTES # BLD AUTO: 1.2 THOUSAND/ÂΜL (ref 0.17–1.22)
MONOCYTES NFR BLD AUTO: 12 % (ref 4–12)
NEUTROPHILS # BLD AUTO: 7.18 THOUSANDS/ÂΜL (ref 1.85–7.62)
NEUTS SEG NFR BLD AUTO: 67 % (ref 43–75)
NRBC BLD AUTO-RTO: 0 /100 WBCS
PLATELET # BLD AUTO: 345 THOUSANDS/UL (ref 149–390)
PMV BLD AUTO: 9.6 FL (ref 8.9–12.7)
POTASSIUM SERPL-SCNC: 4 MMOL/L (ref 3.5–5.3)
PROCALCITONIN SERPL-MCNC: 0.51 NG/ML
RBC # BLD AUTO: 2.76 MILLION/UL (ref 3.88–5.62)
RH BLD: POSITIVE
SODIUM SERPL-SCNC: 133 MMOL/L (ref 135–147)
SPECIMEN EXPIRATION DATE: NORMAL
WBC # BLD AUTO: 10.42 THOUSAND/UL (ref 4.31–10.16)

## 2023-02-16 PROCEDURE — 30233N1 TRANSFUSION OF NONAUTOLOGOUS RED BLOOD CELLS INTO PERIPHERAL VEIN, PERCUTANEOUS APPROACH: ICD-10-PCS | Performed by: INTERNAL MEDICINE

## 2023-02-16 RX ADMIN — HYDROMORPHONE HYDROCHLORIDE 0.2 MG: 0.2 INJECTION, SOLUTION INTRAMUSCULAR; INTRAVENOUS; SUBCUTANEOUS at 23:21

## 2023-02-16 RX ADMIN — INSULIN LISPRO 1 UNITS: 100 INJECTION, SOLUTION INTRAVENOUS; SUBCUTANEOUS at 18:18

## 2023-02-16 RX ADMIN — ASPIRIN 81 MG 81 MG: 81 TABLET ORAL at 09:49

## 2023-02-16 RX ADMIN — OXYCODONE HYDROCHLORIDE 5 MG: 5 TABLET ORAL at 21:49

## 2023-02-16 RX ADMIN — TAMSULOSIN HYDROCHLORIDE 0.4 MG: 0.4 CAPSULE ORAL at 18:18

## 2023-02-16 RX ADMIN — SIMETHICONE 80 MG: 80 TABLET, CHEWABLE ORAL at 12:11

## 2023-02-16 RX ADMIN — INSULIN GLARGINE 5 UNITS: 100 INJECTION, SOLUTION SUBCUTANEOUS at 21:29

## 2023-02-16 RX ADMIN — INSULIN LISPRO 1 UNITS: 100 INJECTION, SOLUTION INTRAVENOUS; SUBCUTANEOUS at 21:31

## 2023-02-16 RX ADMIN — AMLODIPINE BESYLATE 2.5 MG: 2.5 TABLET ORAL at 09:49

## 2023-02-16 RX ADMIN — PANTOPRAZOLE SODIUM 40 MG: 40 TABLET, DELAYED RELEASE ORAL at 09:49

## 2023-02-16 RX ADMIN — INSULIN LISPRO 4 UNITS: 100 INJECTION, SOLUTION INTRAVENOUS; SUBCUTANEOUS at 12:11

## 2023-02-16 RX ADMIN — SIMETHICONE 80 MG: 80 TABLET, CHEWABLE ORAL at 09:49

## 2023-02-16 RX ADMIN — COLLAGENASE SANTYL: 250 OINTMENT TOPICAL at 09:56

## 2023-02-16 RX ADMIN — DULOXETINE HYDROCHLORIDE 30 MG: 30 CAPSULE, DELAYED RELEASE ORAL at 09:49

## 2023-02-16 RX ADMIN — PANTOPRAZOLE SODIUM 40 MG: 40 TABLET, DELAYED RELEASE ORAL at 18:18

## 2023-02-16 RX ADMIN — INSULIN LISPRO 3 UNITS: 100 INJECTION, SOLUTION INTRAVENOUS; SUBCUTANEOUS at 12:11

## 2023-02-16 RX ADMIN — ATORVASTATIN CALCIUM 40 MG: 40 TABLET, FILM COATED ORAL at 09:49

## 2023-02-16 RX ADMIN — INSULIN LISPRO 4 UNITS: 100 INJECTION, SOLUTION INTRAVENOUS; SUBCUTANEOUS at 09:49

## 2023-02-16 RX ADMIN — SIMETHICONE 80 MG: 80 TABLET, CHEWABLE ORAL at 21:29

## 2023-02-16 RX ADMIN — INSULIN LISPRO 4 UNITS: 100 INJECTION, SOLUTION INTRAVENOUS; SUBCUTANEOUS at 18:18

## 2023-02-16 RX ADMIN — VANCOMYCIN HYDROCHLORIDE 1000 MG: 1 INJECTION, SOLUTION INTRAVENOUS at 21:49

## 2023-02-16 RX ADMIN — IOHEXOL 20 ML: 350 INJECTION, SOLUTION INTRAVENOUS at 15:28

## 2023-02-16 RX ADMIN — SIMETHICONE 80 MG: 80 TABLET, CHEWABLE ORAL at 18:18

## 2023-02-16 RX ADMIN — HEPARIN SODIUM 5000 UNITS: 5000 INJECTION INTRAVENOUS; SUBCUTANEOUS at 05:30

## 2023-02-16 RX ADMIN — SODIUM CHLORIDE, PRESERVATIVE FREE 10 ML: 5 INJECTION INTRAVENOUS at 09:49

## 2023-02-16 NOTE — ASSESSMENT & PLAN NOTE
Noted the development of foul-smelling drainage and increasing pain from his chronic abdominal wound  · As evidenced by tachycardia, tachypnea, and leukocytosis  · In setting of purulent abdominal infection   · Hx of colon CA w/ extensive abd surgical hx as below   · Patient has several abdominal drains in place, with CT showing improvement in the fluid collections at the left liver lobe, perisplenic area, and the left retroperitoneum inferior to the spleen  · Completed IV Ertapenem  · IV Vancomycin to complete through 2/16 based on wound cultures - now growing ESBL and MRSA  · Trend WBC; follow-up with pending infectious labs and cultures  · ID consulted; recommendations appreciated   · S/p bedside midline wound debridement + wet dressing 2/8 with general surgery

## 2023-02-16 NOTE — ASSESSMENT & PLAN NOTE
RUQ US revealed 7 mm nonobstructing right intrarenal calculus  No hydronephrosis noted on CT a/p  Asymptomatic at this time     · Flomax  · Intake and output

## 2023-02-16 NOTE — PROGRESS NOTES
Madison Andrea is a 61 y o  male who is currently ordered Vancomycin IV with management by the Pharmacy Consult service  Relevant clinical data and objective / subjective history reviewed  Vancomycin Assessment:  Indication and Goal AUC/Trough: Other, Intra-abdominal sepsis with possible wound infection, -600, trough >10  Clinical Status: stable  Micro:     Renal Function:  SCr: 1 34 mg/dL  CrCl: 66 9 mL/min  Renal replacement: Not on dialysis  Days of Therapy: 7  Current Dose: 1000mg IV q24h  Vancomycin Plan:  New Dosinmg IV today, per ID continue through 23 to complete 7 day course   Estimated AUC: 458 mcg*hr/mL  Estimated Trough: 14 3 mcg/mL  Renal Function Monitoring: Daily BMP and [place] will continue to follow closely for s/sx of nephrotoxicity, infusion reactions and appropriateness of therapy  BMP and CBC will be ordered per protocol  We will continue to follow the patient’s culture results and clinical progress daily      Jason Iniguez, Pharmacist

## 2023-02-16 NOTE — ASSESSMENT & PLAN NOTE
Lab Results   Component Value Date    EGFR 57 02/16/2023    EGFR 65 02/15/2023    EGFR 54 02/14/2023    CREATININE 1 34 (H) 02/16/2023    CREATININE 1 21 02/15/2023    CREATININE 1 41 (H) 02/14/2023     · Creatinine 2 5 to; baseline 1 0, improved  · Suspecting possible multifactorial cause in setting of dehydration and sepsis   Also likely Vancomycin contributing  · Avoid nephrotoxic agents

## 2023-02-16 NOTE — CONSULTS
PHYSICAL MEDICINE AND REHABILITATION INTERPROFESSIONAL CONSULT NOTE  Eloise Mane 61 y o  male MRN: [id number]  Unit/Bed#: -01 Encounter: [id number]    Requested by (Physician/Service): Kimi Leach*  Reason for Consultation:  Assessment of rehabilitation needs    Assessment:  Rehabilitation Diagnosis:   • Debility due to sepsis, ALEXY  • Impaired mobility and self care  • Impaired cognition     Recommendations:  Rehabilitation Plan:  • Continue PT/OT (SLP) while on acute care  • An interprofessional consultation was completed after talking to the primary medicine team in addition to an in-depth review of the patient's chart as below  Mr Marilee Crowley was previously discharged from the [facility] at a relative modified independent level for basic mobility but still requiring some assistance for activities of daily living especially regarding lower body ADLs and toileting  He is admitted to the hospital for continued work-up of fever and leukocytosis which has improved  At this time he is not at the functional baseline he was previously post rehab discharge  Once medically cleared he may be a candidate for an acute inpatient rehabilitation program for a shorter stay in order to improve on his endurance and functional mobility  However at this time his hemoglobin is low at 6 8 and continues his medical management  Once medically cleared he may have progressed further in therapies with an improvement in his overall endurance which may change the decision regarding his overall disposition plan however I anticipate that he will still have therapy and ongoing medical needs to warrant acute inpatient rehabilitation  His overall case is fairly complex and would require daily physician and nursing oversight until he is able to return home with his family    • I called Mr Marilee Crowley directly and discussed his case including his course from the home setting leading up to his hospitalization, the activity that he has been doing well in his room, directing additional exercises and expressing that he would benefit from an acute inpatient rehabilitation program pending availability of beds and medical stability per his primary team   Until a bed is available for him and he is approved officially we can watch his WBC count and fever curve while finishing his antibiotics today in addition monitoring his kidney function  • Covid-19 Testing: [facility] inpatient rehabilitation units require testing within 48 hours of all potential admissions at this time  *Re-testing is NOT required for patients recovering from COVID-19 infection if isolation has been discontinued per CDC criteria  Medical Co-morbidities Plan:  Sepsis  Nephrolithiasis  Metastatic colon cancer to the liver  Elevated alkaline phosphatase  Mid abdomen incision with dehiscence  Ostomy formation and management  Multiple abscesses status post drain placement with IR  Acute kidney injury on chronic kidney disease  Iron deficiency anemia, likely multifactorial obesity current hemoglobin of 6 8  Type 2 diabetes  Hyponatremia  Hypertension  Hyperkalemia now resolved  Depression/anxiety related to medical condition  GERD  Bowel plan: Ostomy  Bladder plan: Voiding  DVT ppx: Heparin      Thank you for this consultation  Do not hesitate to contact service with further questions  Jose Me, DO  PM&R        Total time spent:  90 minutes with more than 50% spent counseling/coordinating care  Counseling includes extended discussion with patient, his internal medicine team, the rehab medical director  re: history, exam, function, mood, rehabilitation management plan, medical co-morbidities plan, and disposition options  Additional time spent with thorough chart review in EMR, reviewing recent medications, labs, imaging, and management plan of primary service    I personally called the patient to discuss his overall care, his course at home between his last rehabilitation stay and this current hospitalization, his activity level while in the acute care setting now and discussing the potential for rehabilitation  All questions were answered      History of Present Illness:  Jessica Ferreira is a 61 y o  male with  has a past medical history of Abdominal pain (03/12/2022), Acute renal failure ([facility] ), Acute respiratory failure with hypoxia ([facility] ) (11/12/2022), Bacteremia (11/12/2022), Cancer ([facility] [facility] ), Diabetes mellitus ([facility] ), Enteritis (08/23/2016), Flash pulmonary edema ([facility] ) (11/12/2022), Gastroparesis due to DM ([facility] ) (08/23/2016), GERD (gastroesophageal reflux disease), Hernia, ventral (08/04/2016), Hyperlipidemia, Hypertension, Morbid obesity ([facility] ) (04/17/2018), Postoperative visit (03/02/2022), SIRS (systemic inflammatory response syndrome) ([facility] ) (03/12/2022), Snoring, and Stage 3a chronic kidney disease ([facility] ) (02/19/2022)     Patient presented to the [address] [place] [address] on 2/7 for increasing fatigue, weakness that has been happening over the last several days prior to admission  He also noted in this timeframe that he was developing a foul-smelling drainage and increasing pain from his abdominal wound  He was placed on antibiotic therapy and followed by the infectious disease team   His abdominal wound grew ESBL and MRSA and is due to complete his vancomycin on 2/16 of note Mr Denisha Brandt is known to the physical medicine and rehabilitation service for a prior hospitalization at [facility] with stay at the [address] [place] [address] after a long and complicated course from December 14, 2022 through January 17, 2023 for critical illness myopathy  That course is summarized below  Mr Denisha Brandt [facility] for an elective surgical procedure due to metastatic colon adenocarcinoma with Dr Renetta Jose    He underwent an exploratory laparotomy at that time including a segment 3 liver resection, ablation, intraoperative ultrasound of the liver   His course was complicated by left upper quadrant hematoma with suspected leak from the transverse colon anastomosis and went back to the OR on 11/16 revealing a left upper quadrant infected hematoma, defect in the transverse colon anastomosis with extravasation  Additionally had a massively dilated cecum requiring resection and had a right hemicolectomy  This was left in discontinuity and temporary abdominal closure was placed  On the following day 11/17 he underwent reexploration with partial descending colectomy, primary colonic anastomosis created with diverting loop ileostomy and midline wound VAC placement  He completed a prolonged course of IV antibiotics and completed them while on the acute rehab unit however required restarting of the ertapenem for period of time due to concerns for incomplete the cleared infection  On 12 3 he was discharged to a skilled nursing facility in [place] however they immediately transferred him back due to the complexity of his case  On 12/6, patient was with noted increased creatinine level, and was administered 1L IV fluid bolus, with noted improvement  He then developed tachycardia, as well as increased abdominal pain, requiring additional IV fluid bolus  Abdominal incision was with noted yellow drainage  CT chest/abdomen/pelvis showed abdominal abscess and distended gallbladder  ID was consulted, and patient was continued on IV antibiotics (plan for 7 day course through 12/15)  Patient was taken to IR and underwent percutaneous cholecystostomy tube insertion and hepatectomy abscess drainage  Nephrology was consulted re: ALEXY, and initiated patient on Sodium Bicarb gtt as well as IV Albumin to assist with intravascular volume shifts, which as since been discontinued  On 12/14, patient with complaints of midline/left chestwall pain, right below IR abscess drain   Lasted for a few seconds and then resolved - suspected related to drain placement, however cardiac workup completed  Trop level was 57 and CXR showed progressive bilateral opacities suspicious for CHF and small left pleural effusion  Cardiology was consulted, with chest pain appearing musculoskeletal, and no further cardiac workup is warranted  Additionally, no diuresis or further intervention suggested for CXR findings  The patient was evaluated by the Rehabilitation team and deemed an appropriate candidate for comprehensive inpatient rehabilitation and admitted to the [facility] on 12/14/2022      While on the rehabilitation unit functional status improved despite the requirement for additional consultation with surgery, gastroenterology, IR, infectious disease for ongoing symptoms including fevers  His ertapenem was restarted after these fevers developed within 3 days of completion of the initial course  Additionally IR needed to place an additional 2 sets of drains for abscesses found during his course with tube checks a total of 3 drains were placed overall in addition to the ostomy and the percutaneous cholecystostomy tube that was placed on 12/8 previously  He was eventually able to improve from an overall moderate to maximum assist for all activities modified independent to supervision for mobility ambulating 150 feet at a modified independent level and only requiring minimal assistance for upper body ADLs and mod to max assist for lower body ADLs  Review of Systems: 10 point ROS negative except for what is noted in HPI    Function:  Prior level of function and living situation:  Physical therapy: bed mobility- independent, transfers-supervision, ambulation- modified independent - supervision ', stairs- 4, incidental touching  Occupational therapy: ADL-min A, dressing UB-min A, LB mod-max A, footwear- total A    The living area: can live on one level, bedroom on 2nd floor and bathroom on 2nd floor; 1/2 bath on main level and able to accommodate hospital bed in study    There are 2 steps to enter the home     The patient will not have 24 hour supervision/physical assistance available upon discharge  Patient's spouse and family is supportive, able to assist as needed  Current level of function:  Physical Therapy: Supervision for bed mobility, transfers min to mod assist, ambulation min assist x2 with rolling walker for 5 feet limited by fatigue and line management  Occupational Therapy: Supervision to min assist for upper body ADLs and grooming, total assist for lower body ADLs    Physical Exam:  /84   Pulse 80   Temp 98 2 °F (36 8 °C)   Resp 18   Ht 5' 10" (1 778 m)   Wt 88 5 kg (195 lb)   SpO2 97%   BMI 27 98 kg/m²        Intake/Output Summary (Last 24 hours) at 2/16/2023 0949  Last data filed at 2/16/2023 6977  Gross per 24 hour   Intake --   Output 1530 ml   Net -1530 ml       Body mass index is 27 98 kg/m²  Social History:    Social History     Socioeconomic History   • Marital status: /Civil Union     Spouse name: None   • Number of children: None   • Years of education: None   • Highest education level: None   Occupational History   • Occupation: ACTOR     Employer: [employer]   Tobacco Use   • Smoking status: Never   • Smokeless tobacco: Never   Vaping Use   • Vaping Use: Never used   Substance and Sexual Activity   • Alcohol use: Never   • Drug use: No   • Sexual activity: Yes     Partners: Female   Other Topics Concern   • None   Social History Narrative   • None     Social Determinants of Health     Financial Resource Strain: Not on file   Food Insecurity: No Food Insecurity   • Worried About Running Out of Food in the Last Year: Never true   • Ran Out of Food in the Last Year: Never true   Transportation Needs: No Transportation Needs   • Lack of Transportation (Medical): No   • Lack of Transportation (Non-Medical):  No   Physical Activity: Insufficiently Active   • Days of Exercise per Week: 5 days   • Minutes of Exercise per Session: 10 min   Stress: No Stress Concern Present   • Feeling of Stress : Not at all   Social Connections: Not on file   Intimate Partner Violence: Not on file   Housing Stability: Low Risk    • Unable to Pay for Housing in the Last Year: No   • Number of Places Lived in the Last Year: 1   • Unstable Housing in the Last Year: No        Family History:    Family History   Problem Relation Age of Onset   • Diabetes Mother         mellitus   • Lung cancer Mother    • Coronary artery disease Father          Medications:     Current Facility-Administered Medications:   •  acetaminophen (TYLENOL) tablet 650 mg, 650 mg, Oral, Q4H PRN, Betsy Dunn PA-C, 650 mg at 02/10/23 2141  •  amLODIPine (NORVASC) tablet 2 5 mg, 2 5 mg, Oral, Daily, Betsy Dunn PA-C, 2 5 mg at 02/13/23 5313  •  aspirin chewable tablet 81 mg, 81 mg, Oral, Daily, Betsy Dunn PA-C, 81 mg at 02/15/23 1000  •  atorvastatin (LIPITOR) tablet 40 mg, 40 mg, Oral, Daily, Betsy Dunn PA-C, 40 mg at 02/15/23 1000  •  collagenase (SANTYL) ointment, , Topical, Daily, Ruth Benitez PA-C, Given at 02/15/23 1025  •  DULoxetine (CYMBALTA) delayed release capsule 30 mg, 30 mg, Oral, Daily, Betsy Dunn PA-C, 30 mg at 02/15/23 1000  •  heparin (porcine) subcutaneous injection 5,000 Units, 5,000 Units, Subcutaneous, Q8H [address], Betsy Dunn PA-C, 5,000 Units at 02/16/23 0530  •  HYDROmorphone HCl (DILAUDID) injection 0 2 mg, 0 2 mg, Intravenous, Q4H PRN, Nelia Ambrosio DO, 0 2 mg at 02/15/23 2302  •  influenza vaccine, recombinant, quadrivalent (FLUBLOK) IM injection 0 5 mL, 0 5 mL, Intramuscular, Prior to discharge, Earnest Tillman MD  •  insulin glargine (LANTUS) subcutaneous injection 5 Units 0 05 mL, 5 Units, Subcutaneous, HS, Kiara Wang PA-C, 5 Units at 02/15/23 2121  •  insulin lispro (HumaLOG) 100 units/mL subcutaneous injection 1-6 Units, 1-6 Units, Subcutaneous, TID AC, 2 Units at 02/15/23 1206 **AND** Fingerstick Glucose (POCT), , , 4x Daily AC and at bedtime, [address], PA-C  •  insulin lispro (HumaLOG) 100 units/mL subcutaneous injection 1-6 Units, 1-6 Units, Subcutaneous, HS, [address], PA-C, 1 Units at 02/15/23 2122  •  insulin lispro (HumaLOG) 100 units/mL subcutaneous injection 4 Units, 4 Units, Subcutaneous, TID With Meals, [address], PA-C, 4 Units at 02/15/23 1800  •  melatonin tablet 6 mg, 6 mg, Oral, HS PRN, Willodean Kin, PA-C, 6 mg at 02/11/23 2224  •  methocarbamol (ROBAXIN) tablet 500 mg, 500 mg, Oral, BID PRN, [address], PA-C, 500 mg at 02/07/23 2118  •  ondansetron [facility]) injection 4 mg, 4 mg, Intravenous, Q4H PRN, Nelia Kenzie Saraiya, DO, 4 mg at 02/11/23 2129  •  oxyCODONE (ROXICODONE) IR tablet 5 mg, 5 mg, Oral, Q4H PRN, Nelia Kenzie Saraiya, DO, 5 mg at 02/15/23 2121  •  oxyCODONE (ROXICODONE) split tablet 2 5 mg, 2 5 mg, Oral, Q4H PRN, Nelia Kenzie Saraiya, DO  •  pantoprazole (PROTONIX) EC tablet 40 mg, 40 mg, Oral, BID, [address], PA-C, 40 mg at 02/15/23 1800  •  pneumococcal 13-valent conjugate vaccine (PREVNAR-13) IM injection 0 5 mL, 0 5 mL, Intramuscular, Prior to discharge, Monty Ramirez MD  •  simethicone (MYLICON) chewable tablet 80 mg, 80 mg, Oral, 4x Daily (with meals and at bedtime), [address], PA-C, 80 mg at 02/15/23 2121  •  sodium chloride (PF) 0 9 % injection 10 mL, 10 mL, Intravenous, Daily, Nelia Kenzie Saraiya, DO, 10 mL at 02/15/23 1030  •  sodium chloride 0 9 % infusion, 75 mL/hr, Intravenous, Continuous, Diane Starr MD, Last Rate: 75 mL/hr at 02/15/23 2303, 75 mL/hr at 02/15/23 2303  •  tamsulosin (FLOMAX) capsule 0 4 mg, 0 4 mg, Oral, Daily With Dinner, [address], PA-C, 0 4 mg at 02/15/23 1800  •  vancomycin (VANCOCIN) IVPB (premix in dextrose) 1,000 mg 200 mL, 1,000 mg, Intravenous, Q24H, Nelia Ambrosio DO, Last Rate: 200 mL/hr at 02/15/23 2121, 1,000 mg at 02/15/23 2121    Past Medical History:     Past Medical History:   Diagnosis Date   • Abdominal pain 03/12/2022   • Acute renal failure ([place] [address] )     [id number] resolved   • Acute respiratory failure with hypoxia ([place] [address] ) 11/12/2022   • Bacteremia 11/12/2022   • Cancer ([place] [address] )    • Diabetes mellitus ([place] [address] )    • Enteritis 08/23/2016   • Flash pulmonary edema ([place] [address] ) 11/12/2022   • Gastroparesis due to DM ([place] [address] ) 08/23/2016   • GERD (gastroesophageal reflux disease)    • Hernia, ventral 08/04/2016   • Hyperlipidemia    • Hypertension    • Morbid obesity ([place] [address] ) 04/17/2018   • Postoperative visit 03/02/2022   • SIRS (systemic inflammatory response syndrome) ([place] [address] ) 03/12/2022   • Snoring    • Stage 3a chronic kidney disease ([place] [address] ) 02/19/2022        Past Surgical History:     Past Surgical History:   Procedure Laterality Date   • COLONOSCOPY     • COLOSTOMY N/A 02/20/2022    Procedure: COLOSTOMY LOOP, diverting;  Surgeon: Zully Jamil MD;  Location: [facility];  Service: General   • ESOPHAGOGASTRODUODENOSCOPY N/A 08/24/2016    Procedure: ESOPHAGOGASTRODUODENOSCOPY (EGD); Surgeon: Luz Stallings MD;  Location: [facility];   Service:    • EXPLORATORY LAPAROTOMY W/ BOWEL RESECTION N/A 11/16/2022    Procedure: LAPAROTOMY EXPLORATORY W/ BOWEL RESECTION- VAC PLACEMENT;  Surgeon: Dary Zavala MD;  Location: [facility];  Service: Surgical Oncology   • EXPLORATORY LAPAROTOMY W/ BOWEL RESECTION N/A 11/17/2022    Procedure: LAPAROTOMY EXPLORATORY W/ BOWEL RESECTION;  Surgeon: Dary Zavala MD;  [facility];  Service: Surgical Oncology   • ILEOSTOMY N/A 11/17/2022    Procedure: Prashant Busing;  Surgeon: Dary Zavala MD;  [facility];  Service: Surgical Oncology   • ILEOSTOMY CLOSURE N/A 11/7/2022    Procedure: REVERSAL COLOSTOMY;  Surgeon: Demetrio Ibarra MD;  [facility];  Service: Surgical Oncology   • IR CHOLECYSTOSTOMY TUBE CHECK/CHANGE/REPOSITION/REINSERTION/UPSIZE  12/12/2022   • IR CHOLECYSTOSTOMY TUBE PLACEMENT  12/8/2022   • IR DRAINAGE TUBE CHECK AND/OR REMOVAL  1/3/2023   • IR DRAINAGE TUBE CHECK/CHANGE/REPOSITION/REINSERTION/UPSIZE  1/12/2023   • IR DRAINAGE TUBE CHECK/CHANGE/REPOSITION/REINSERTION/UPSIZE  1/31/2023   • IR DRAINAGE TUBE CHECK/CHANGE/REPOSITION/REINSERTION/UPSIZE  2/10/2023   • IR DRAINAGE TUBE PLACEMENT  12/8/2022   • IR DRAINAGE TUBE PLACEMENT  12/20/2022   • IR PORT PLACEMENT  03/10/2022   • IR THORACENTESIS  1/12/2023   • KIDNEY STONE SURGERY     • LIVER BIOPSY LAPAROSCOPIC N/A 02/20/2022    Procedure: DIAGNOSTIC LAPAROSCOPIC LIVER BIOPSY, DIVERTING LOOP COLOSTOMY ;  Surgeon: Luz Maria Kang MD;  [facility];  Service: General   • LIVER LOBECTOMY N/A 11/7/2022    Procedure: SEGMENT 3 LIVER RESECTION, ABLATION, INTRAOPERATIVE U/S OF LIVER, PERITONEAL BIOPSY;  Surgeon: Chastity Caban MD;  [facility];  Service: Surgical Oncology   • RIGHT COLON RESECTION N/A 11/7/2022    Procedure: EX LAP, TRANVERSE COLON RESECTION;  Surgeon: Chastity Caban MD;  [facility];  Service: Surgical Oncology   • TONSILLECTOMY     • UMBILICAL HERNIA REPAIR LAPAROSCOPIC N/A 04/26/2019    Procedure: LAPAROSCOPIC UMBILICAL HERNIA REPAIR;  Surgeon: Luz Maria Kang MD;  [facility];  Service: General   • VAC DRESSING APPLICATION N/A 28/66/3784    Procedure: APPLICATION VAC DRESSING ABDOMEN/TRUNK;  Surgeon: Sanford Cook MD;  [facility];  Service: Surgical Oncology         Allergies:      Allergies   Allergen Reactions   • Shellfish-Derived Products - Food Allergy Anaphylaxis   • Erythromycin GI Intolerance           LABORATORY RESULTS:      Lab Results   Component Value Date    HGB 6 8 (LL) 02/16/2023    HGB 13 6 07/21/2017    HCT 23 2 (L) 02/16/2023    HCT 39 3 07/21/2017    WBC 10 42 (H) 02/16/2023    WBC 10 8 07/21/2017     Lab Results   Component Value Date    BUN 19 02/16/2023    BUN 21 09/26/2022     05/02/2017    K 4 0 02/16/2023    K 4 1 09/26/2022     02/16/2023     09/26/2022    GLUCOSE 151 (H) 11/16/2022    GLUCOSE 118 (H) 05/02/2017 CREATININE 1 34 (H) 02/16/2023    CREATININE 1 18 05/02/2017     Lab Results   Component Value Date    PROTIME 16 1 (H) 02/07/2023    INR 1 32 (H) 02/07/2023        DIAGNOSTIC STUDIES: Reviewed  CT chest abdomen pelvis wo contrast    Result Date: 2/7/2023  Impression: 1  Interval decrease in bilateral pleural effusions with development of multiple small bilateral lung nodules concerning for metastatic lung disease  2   Fluid collection at the level of the left lobe of the liver is smaller with resolution of the other perisplenic fluid collections and improvement in collections in the left retroperitoneum inferior to the spleen  The study was marked in [facility] for immediate notification  Workstation performed: [id number]     US right upper quadrant    Result Date: 2/8/2023  Impression: Decompressed gallbladder around a cholecystostomy tube, limiting evaluation  Hepatomegaly  Heterogeneous echotexture of the liver in this patient with known metastatic disease  7 mm nonobstructing right intrarenal calculus  Workstation performed: [id number]     IR drainage tube check/change/reposition/reinsertion/upsize    Result Date: 2/10/2023  Impression: Impression: Cholecystostomy is identified within the gallbladder however small bowel opacifies prior to good opacification of the cystic and common ducts raising question of a choleenteric fistula  Abscess catheter in good position however output is persistent   Follow-up tube check in approximately one week Workstation performed: [id number]     Bert Ley DO  [facility]

## 2023-02-16 NOTE — PROGRESS NOTES
[address] [facility]  Progress Note - Lillie Kaur 1965, 61 y o  male MRN: [id number]  Unit/Bed#: -Shawn Encounter: [id number]  Primary Care Provider: Maria L Ambriz MD   Date and time admitted to hospital: 2/7/2023  3:01 PM    * Sepsis [facility])  Assessment & Plan  Noted the development of foul-smelling drainage and increasing pain from his chronic abdominal wound  · As evidenced by tachycardia, tachypnea, and leukocytosis  · In setting of purulent abdominal infection   · Hx of colon CA w/ extensive abd surgical hx as below   · Patient has several abdominal drains in place, with CT showing improvement in the fluid collections at the left liver lobe, perisplenic area, and the left retroperitoneum inferior to the spleen  · Completed IV Ertapenem  · IV Vancomycin to complete through 2/16 based on wound cultures - now growing ESBL and MRSA  · Trend WBC; follow-up with pending infectious labs and cultures  · ID consulted; recommendations appreciated   · S/p bedside midline wound debridement + wet dressing 2/8 with general surgery    Nephrolithiasis  Assessment & Plan  RUQ US revealed 7 mm nonobstructing right intrarenal calculus  No hydronephrosis noted on CT a/p  Asymptomatic at this time     · Flomax  · Intake and output    Abnormal CT scan  Assessment & Plan  CT shows interval decrease in bilateral pleural effusions, however multiple small bilateral lung nodules are seen which are concerning for possible metastatic lung disease  · Patient reports having prior known lung nodules  · Oxygenating appropriately on room air; continue to monitor oxygenation  · Outpatient f/u    Acute on chronic kidney failure [facility])  Assessment & Plan  Lab Results   Component Value Date    EGFR 57 02/16/2023    EGFR 65 02/15/2023    EGFR 54 02/14/2023    CREATININE 1 34 (H) 02/16/2023    CREATININE 1 21 02/15/2023    CREATININE 1 41 (H) 02/14/2023     · Creatinine 2 5 to; baseline 1 0, improved  · Suspecting possible multifactorial cause in setting of dehydration and sepsis  Also likely Vancomycin contributing  · Avoid nephrotoxic agents    Colon cancer metastasized to liver [facility])  Assessment & Plan  Follows w/ outpatient [facility] onc and palliative care  Extensive abdominal surgical history (11/7 ex lap w/ section 3 liver resection, and development of left upper quadrant hematoma and suspected leak from transverse colon anastomosis complication; 59/94 right hemicolectomy; 11/17 re-exploration w/partial descending colectomy; 12/8 IR percutaneous cholecystostomy tube and hepatectomy abscess drainage; 12/20 IR drains placed for perisplenic abscess)  · Patient does not wish to follow with [facility] oncology anymore as it is too far of a drive; would like to establish with [facility] oncology (already follows with Dr Johanna Velásquez)  · Continue outpatient f/u    Anemia  Assessment & Plan  Recent Labs     02/14/23  0519 02/15/23  1412 02/16/23  0530   HGB 7 1* 7 5* 6 8*     · With noted hgb of 6 8 today  · No overt bleeding noted  · Transfuse today, consent obtained and in chart  · Recheck HH, transfuse if hgb <7    Benign essential hypertension  Assessment & Plan  BP stable  · Continue prehospital HTN medications  · Monitor BP per unit protocol    Type 2 diabetes mellitus without complication, with long-term current use of insulin (HCC)  Assessment & Plan    Lab Results   Component Value Date    HGBA1C 8 0 (H) 09/26/2022     · Home Lantus 8U qHS and humalog 4U TID w/meals   · Dose reduce to 5U qhs + 4 U TID   · 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  · Hypoglycemia protocol  · Diabetic diet       VTE Pharmacologic Prophylaxis: VTE Score: 6 High Risk (Score >/= 5) - Pharmacological DVT Prophylaxis Ordered: heparin  Sequential Compression Devices Ordered  Patient Centered Rounds: I performed bedside rounds with nursing staff today    Discussions with Specialists or Other Care Team Provider: ID, PMR    Education and Discussions with Family / Patient: will call wife for updates  Total Time Spent on Date of Encounter in care of patient: 45 minutes This time was spent on one or more of the following: performing physical exam; counseling and coordination of care; obtaining or reviewing history; documenting in the medical record; reviewing/ordering tests, medications or procedures; communicating with other healthcare professionals and discussing with patient's family/caregivers  Current Length of Stay: 9 day(s)  Current Patient Status: Inpatient   Certification Statement: The patient will continue to require additional inpatient hospital stay due to Requiring blood transfusion  Discharge Plan: Anticipate discharge in 24-48 hrs to rehab facility  Code Status: Level 1 - Full Code    Subjective:   Denies any pain today  Just overall feels weak  Really would prefer to go to [facility]  No other complaints endorsed  Objective:     Vitals:   Temp (24hrs), Av 4 °F (36 9 °C), Min:98 2 °F (36 8 °C), Max:98 8 °F (37 1 °C)    Temp:  [98 2 °F (36 8 °C)-98 8 °F (37 1 °C)] 98 2 °F (36 8 °C)  HR:  [80-98] 80  Resp:  [18] 18  BP: (118-126)/(83-94) 126/84  SpO2:  [93 %-100 %] 97 %  Body mass index is 27 98 kg/m²  Input and Output Summary (last 24 hours): Intake/Output Summary (Last 24 hours) at 2023 1255  Last data filed at 2023 5762  Gross per 24 hour   Intake --   Output 1530 ml   Net -1530 ml       Physical Exam:   Physical Exam  Vitals and nursing note reviewed  Constitutional:       General: He is not in acute distress  Appearance: He is well-developed  He is ill-appearing (chronically)  He is not toxic-appearing  HENT:      Head: Normocephalic and atraumatic  Nose: Nose normal       Mouth/Throat:      Mouth: Mucous membranes are moist       Pharynx: Oropharynx is clear  Eyes:      Conjunctiva/sclera: Conjunctivae normal       Pupils: Pupils are equal, round, and reactive to light  Neck:      Vascular: No JVD  Cardiovascular:      Rate and Rhythm: Normal rate and regular rhythm  Pulses: Normal pulses  Pulmonary:      Effort: Pulmonary effort is normal  No respiratory distress  Breath sounds: Normal breath sounds  Abdominal:      General: Bowel sounds are normal       Palpations: Abdomen is soft  Tenderness: There is no abdominal tenderness  There is no guarding  Comments: (+) VICTORINO drains x2, (+) colostomy   Musculoskeletal:         General: No swelling, tenderness or deformity  Normal range of motion  Cervical back: Normal range of motion and neck supple  No rigidity  Lymphadenopathy:      Cervical: No cervical adenopathy  Skin:     General: Skin is warm and dry  Findings: No erythema  Neurological:      General: No focal deficit present  Mental Status: He is alert and oriented to person, place, and time  Mental status is at baseline  Motor: No abnormal muscle tone  Psychiatric:         Mood and Affect: Mood normal          Thought Content:  Thought content normal          Additional Data:     Labs:  Results from last 7 days   Lab Units 02/16/23  0530   WBC Thousand/uL 10 42*   HEMOGLOBIN g/dL 6 8*   HEMATOCRIT % 23 2*   PLATELETS Thousands/uL 345   NEUTROS PCT % 67   LYMPHS PCT % 18   MONOS PCT % 12   EOS PCT % 0     Results from last 7 days   Lab Units 02/16/23  0530 02/15/23  1412   SODIUM mmol/L 133* 131*   POTASSIUM mmol/L 4 0 4 1   CHLORIDE mmol/L 103 103   CO2 mmol/L 22 21   BUN mg/dL 19 22   CREATININE mg/dL 1 34* 1 21   ANION GAP mmol/L 8 7   CALCIUM mg/dL 7 8* 7 9*   ALBUMIN g/dL  --  2 0*   TOTAL BILIRUBIN mg/dL  --  0 60   ALK PHOS U/L  --  723*   ALT U/L  --  9   AST U/L  --  31   GLUCOSE RANDOM mg/dL 104 172*         Results from last 7 days   Lab Units 02/16/23  1122 02/16/23  0739 02/15/23  2116 02/15/23  1759 02/15/23  1114 02/15/23  0721 02/14/23  2058 02/14/23  1609 02/14/23  1149 02/14/23  0821 02/13/23  2107 02/13/23  1726   POC GLUCOSE mg/dl 230* 125 155* 131 220* 120 121 157* 164* 110 154* 147*         Results from last 7 days   Lab Units 02/16/23  0530 02/15/23  1412 02/13/23  0505 02/11/23  0512 02/10/23  0955   LACTIC ACID mmol/L  --   --   --   --  0 7   PROCALCITONIN ng/ml 0 51* 0 46* 0 75* 0 61*  --        Lines/Drains:  Invasive Devices     Central Venous Catheter Line  Duration           Port A Cath 03/10/22 Right Chest 343 days          Drain  Duration           Ileostomy LUQ 90 days    Cholecystostomy Tube 65 days    Abscess Drain Abdomen 34 days                Central Line:  Goal for removal: N/A - Chronic PICC             Imaging: No pertinent imaging reviewed      Recent Cultures (last 7 days):         Last 24 Hours Medication List:   Current Facility-Administered Medications   Medication Dose Route Frequency Provider Last Rate   • acetaminophen  650 mg Oral Q4H PRN Yrn Headley PA-C     • amLODIPine  2 5 mg Oral Daily Yrn Headley PA-C     • aspirin  81 mg Oral Daily Yrn Headley [place]     • atorvastatin  40 mg Oral Daily Yrn Headley [place]     • collagenase   Topical Daily Jos Eleanor Lopez PA-C     • DULoxetine  30 mg Oral Daily Yrn Headley [place]     • heparin (porcine)  5,000 Units Subcutaneous [facility] Yrn Headley PA-C     • HYDROmorphone  0 2 mg Intravenous Q4H PRN Nelia Kenzie Saraiya, DO     • influenza vaccine  0 5 mL Intramuscular Prior to discharge Ana María Barahona MD     • insulin glargine  5 Units Subcutaneous HS Kiara Wang PA-C     • insulin lispro  1-6 Units Subcutaneous TID ABIGAIL sanchez PA-C     • insulin lispro  1-6 Units Subcutaneous HS Yrn Headley PA-C     • insulin lispro  4 Units Subcutaneous TID With Meals Yrn Headley PA-C     • melatonin  6 mg Oral HS PRN Camden Garcia PA-C     • methocarbamol  500 mg Oral BID PRN Yrn Headley PA-C     • ondansetron  4 mg Intravenous Q4H PRN Nelia Kenzie Saraiya, DO     • oxyCODONE  5 mg Oral Q4H PRN Nelia Kenzie Saraiya, DO     • oxyCODONE  2 5 mg Oral Q4H PRN Neliapaulette Ambrosio DO     • pantoprazole  40 mg Oral BID Leena Jimenes PA-C     • pneumococcal 13-valent conjugate vaccine  0 5 mL Intramuscular Prior to discharge Lupe Cotto MD     • simethicone  80 mg Oral 4x Daily (with meals and at bedtime) Leena Jimenes PA-C     • sodium chloride (PF)  10 mL Intravenous Daily Neliapaulette Ambrosio DO     • sodium chloride  75 mL/hr Intravenous Continuous Alex Harris MD 75 mL/hr (02/15/23 2303)   • tamsulosin  0 4 mg [facility] [address] [facility]     • vancomycin  1,000 mg Intravenous Q24H Neliapaulette Ambrosio DO 1,000 mg (02/15/23 2121)        Today, Patient Was Seen By: Alex Harris MD    **Please Note: This note may have been constructed using a voice recognition system  **

## 2023-02-16 NOTE — PLAN OF CARE
Problem: PAIN - ADULT  Goal: Verbalizes/displays adequate comfort level or baseline comfort level  Description: Interventions:  - Encourage patient to monitor pain and request assistance  - Assess pain using appropriate pain scale  - Administer analgesics based on type and severity of pain and evaluate response  - Implement non-pharmacological measures as appropriate and evaluate response  - Consider cultural and social influences on pain and pain management  - Notify physician/advanced practitioner if interventions unsuccessful or patient reports new pain  Outcome: Progressing     Problem: INFECTION - ADULT  Goal: Absence or prevention of progression during hospitalization  Description: INTERVENTIONS:  - Assess and monitor for signs and symptoms of infection  - Monitor lab/diagnostic results  - Monitor all insertion sites, i e  indwelling lines, tubes, and drains  - Monitor endotracheal if appropriate and nasal secretions for changes in amount and color  - [place] appropriate cooling/warming therapies per order  - Administer medications as ordered  - Instruct and encourage patient and family to use good hand hygiene technique  - Identify and instruct in appropriate isolation precautions for identified infection/condition  Outcome: Progressing  Goal: Absence of fever/infection during neutropenic period  Description: INTERVENTIONS:  - Monitor WBC    Outcome: Progressing     Problem: SAFETY ADULT  Goal: Patient will remain free of falls  Description: INTERVENTIONS:  - Educate patient/family on patient safety including physical limitations  - Instruct patient to call for assistance with activity   - Consult OT/PT to assist with strengthening/mobility   - Keep Call bell within reach  - Keep bed low and locked with side rails adjusted as appropriate  - Keep care items and personal belongings within reach  - Initiate and maintain comfort rounds  - Make Fall Risk Sign visible to staff  - Offer Toileting every  Hours, in advance of need  - Initiate/Maintain alarm  - Obtain necessary fall risk management equipment:   - Apply yellow socks and bracelet for high fall risk patients  - Consider moving patient to room near nurses station  Outcome: Progressing  Goal: Maintain or return to baseline ADL function  Description: INTERVENTIONS:  -  Assess patient's ability to carry out ADLs; assess patient's baseline for ADL function and identify physical deficits which impact ability to perform ADLs (bathing, care of mouth/teeth, toileting, grooming, dressing, etc )  - Assess/evaluate cause of self-care deficits   - Assess range of motion  - Assess patient's mobility; develop plan if impaired  - Assess patient's need for assistive devices and provide as appropriate  - Encourage maximum independence but intervene and supervise when necessary  - Involve family in performance of ADLs  - Assess for home care needs following discharge   - Consider OT consult to assist with ADL evaluation and planning for discharge  - Provide patient education as appropriate  Outcome: Progressing  Goal: Maintains/Returns to pre admission functional level  Description: INTERVENTIONS:  - Perform BMAT or MOVE assessment daily    - Set and communicate daily mobility goal to care team and patient/family/caregiver  - Collaborate with rehabilitation services on mobility goals if consulted  - Perform Range of Motion  times a day  - Reposition patient every  hours    - Dangle patient  times a day  - Stand patient  times a day  - Ambulate patient  times a day  - Out of bed to chair  times a day   - Out of bed for meals  times a day  - Out of bed for toileting  - Record patient progress and toleration of activity level   Outcome: Progressing     Problem: DISCHARGE PLANNING  Goal: Discharge to home or other facility with appropriate resources  Description: INTERVENTIONS:  - Identify barriers to discharge w/patient and caregiver  - Arrange for needed discharge resources and transportation as appropriate  - Identify discharge learning needs (meds, wound care, etc )  - Arrange for interpretive services to assist at discharge as needed  - Refer to Case Management Department for coordinating discharge planning if the patient needs post-hospital services based on physician/advanced practitioner order or complex needs related to functional status, cognitive ability, or social support system  Outcome: Progressing     Problem: Knowledge Deficit  Goal: Patient/family/caregiver demonstrates understanding of disease process, treatment plan, medications, and discharge instructions  Description: Complete learning assessment and assess knowledge base    Interventions:  - Provide teaching at level of understanding  - Provide teaching via preferred learning methods  Outcome: Progressing     Problem: SKIN/TISSUE INTEGRITY - ADULT  Goal: Skin Integrity remains intact(Skin Breakdown Prevention)  Description: Assess:  -Perform Michael assessment every   -Clean and moisturize skin every   -Inspect skin when repositioning, toileting, and assisting with ADLS  -Assess under medical devices such as  every   -Assess extremities for adequate circulation and sensation     Bed Management:  -Have minimal linens on bed & keep smooth, unwrinkled  -Change linens as needed when moist or perspiring  -Avoid sitting or lying in one position for more than  hours while in bed  -Keep HOB at degrees     Toileting:  -Offer bedside commode  -Assess for incontinence every   -Use incontinent care products after each incontinent episode such as     Activity:  -Mobilize patient  times a day  -Encourage activity and walks on unit  -Encourage or provide ROM exercises   -Turn and reposition patient every  Hours  -Use appropriate equipment to lift or move patient in bed  -Instruct/ Assist with weight shifting every  when out of bed in chair  -Consider limitation of chair time  hour intervals    Skin Care:  -Avoid use of baby powder, tape, friction and shearing, hot water or constrictive clothing  -Relieve pressure over bony prominences using   -Do not massage red bony areas    Next Steps:  -Teach patient strategies to minimize risks such as    -Consider consults to  interdisciplinary teams such as   Outcome: Progressing  Goal: Incision(s), wounds(s) or drain site(s) healing without S/S of infection  Description: INTERVENTIONS  - Assess and document dressing, incision, wound bed, drain sites and surrounding tissue  - Provide patient and family education  - Perform skin care/dressing changes every   Outcome: Progressing  Goal: Pressure injury heals and does not worsen  Description: Interventions:  - Implement low air loss mattress or specialty surface (Criteria met)  - Apply silicone foam dressing  - Instruct/assist with weight shifting every  minutes when in chair   - Limit chair time to  hour intervals  - Use special pressure reducing interventions such as  when in chair   - Apply fecal or urinary incontinence containment device   - Perform passive or active ROM every   - Turn and reposition patient & offload bony prominences every  hours   - Utilize friction reducing device or surface for transfers   - Consider consults to  interdisciplinary teams such as   - Use incontinent care products after each incontinent episode such as   - Consider nutrition services referral as needed  Outcome: Progressing     Problem: Prexisting or High Potential for Compromised Skin Integrity  Goal: Skin integrity is maintained or improved  Description: INTERVENTIONS:  - Identify patients at risk for skin breakdown  - Assess and monitor skin integrity  - Assess and monitor nutrition and hydration status  - Monitor labs   - Assess for incontinence   - Turn and reposition patient  - Assist with mobility/ambulation  - Relieve pressure over bony prominences  - Avoid friction and shearing  - Provide appropriate hygiene as needed including keeping skin clean and dry  - Evaluate need for skin moisturizer/barrier cream  - Collaborate with interdisciplinary team   - Patient/family teaching  - Consider wound care consult   Outcome: Progressing     Problem: MOBILITY - ADULT  Goal: Maintain or return to baseline ADL function  Description: INTERVENTIONS:  -  Assess patient's ability to carry out ADLs; assess patient's baseline for ADL function and identify physical deficits which impact ability to perform ADLs (bathing, care of mouth/teeth, toileting, grooming, dressing, etc )  - Assess/evaluate cause of self-care deficits   - Assess range of motion  - Assess patient's mobility; develop plan if impaired  - Assess patient's need for assistive devices and provide as appropriate  - Encourage maximum independence but intervene and supervise when necessary  - Involve family in performance of ADLs  - Assess for home care needs following discharge   - Consider OT consult to assist with ADL evaluation and planning for discharge  - Provide patient education as appropriate  Outcome: Progressing  Goal: Maintains/Returns to pre admission functional level  Description: INTERVENTIONS:  - Perform BMAT or MOVE assessment daily    - Set and communicate daily mobility goal to care team and patient/family/caregiver  - Collaborate with rehabilitation services on mobility goals if consulted  - Perform Range of Motion  times a day  - Reposition patient every  hours    - Dangle patient  times a day  - Stand patient  times a day  - Ambulate patient  times a day  - Out of bed to chair  times a day   - Out of bed for meals  times a day  - Out of bed for toileting  - Record patient progress and toleration of activity level   Outcome: Progressing

## 2023-02-16 NOTE — PLAN OF CARE
Problem: PAIN - ADULT  Goal: Verbalizes/displays adequate comfort level or baseline comfort level  Description: Interventions:  - Encourage patient to monitor pain and request assistance  - Assess pain using appropriate pain scale  - Administer analgesics based on type and severity of pain and evaluate response  - Implement non-pharmacological measures as appropriate and evaluate response  - Consider cultural and social influences on pain and pain management  - Notify physician/advanced practitioner if interventions unsuccessful or patient reports new pain  Outcome: Progressing     Problem: INFECTION - ADULT  Goal: Absence or prevention of progression during hospitalization  Description: INTERVENTIONS:  - Assess and monitor for signs and symptoms of infection  - Monitor lab/diagnostic results  - Monitor all insertion sites, i e  indwelling lines, tubes, and drains  - Monitor endotracheal if appropriate and nasal secretions for changes in amount and color  - [place] appropriate cooling/warming therapies per order  - Administer medications as ordered  - Instruct and encourage patient and family to use good hand hygiene technique  - Identify and instruct in appropriate isolation precautions for identified infection/condition  Outcome: Progressing  Goal: Absence of fever/infection during neutropenic period  Description: INTERVENTIONS:  - Monitor WBC    Outcome: Progressing     Problem: SAFETY ADULT  Goal: Patient will remain free of falls  Description: INTERVENTIONS:  - Educate patient/family on patient safety including physical limitations  - Instruct patient to call for assistance with activity   - Consult OT/PT to assist with strengthening/mobility   - Keep Call bell within reach  - Keep bed low and locked with side rails adjusted as appropriate  - Keep care items and personal belongings within reach  - Initiate and maintain comfort rounds  - Make Fall Risk Sign visible to staff  - Offer Toileting every  Hours, in advance of need  - Initiate/Maintain alarm  - Obtain necessary fall risk management equipment:   - Apply yellow socks and bracelet for high fall risk patients  - Consider moving patient to room near nurses station  Outcome: Progressing  Goal: Maintain or return to baseline ADL function  Description: INTERVENTIONS:  -  Assess patient's ability to carry out ADLs; assess patient's baseline for ADL function and identify physical deficits which impact ability to perform ADLs (bathing, care of mouth/teeth, toileting, grooming, dressing, etc )  - Assess/evaluate cause of self-care deficits   - Assess range of motion  - Assess patient's mobility; develop plan if impaired  - Assess patient's need for assistive devices and provide as appropriate  - Encourage maximum independence but intervene and supervise when necessary  - Involve family in performance of ADLs  - Assess for home care needs following discharge   - Consider OT consult to assist with ADL evaluation and planning for discharge  - Provide patient education as appropriate  Outcome: Progressing  Goal: Maintains/Returns to pre admission functional level  Description: INTERVENTIONS:  - Perform BMAT or MOVE assessment daily    - Set and communicate daily mobility goal to care team and patient/family/caregiver  - Collaborate with rehabilitation services on mobility goals if consulted  - Perform Range of Motion  times a day  - Reposition patient every  hours    - Dangle patient  times a day  - Stand patient  times a day  - Ambulate patient  times a day  - Out of bed to chair  times a day   - Out of bed for meals  times a day  - Out of bed for toileting  - Record patient progress and toleration of activity level   Outcome: Progressing     Problem: DISCHARGE PLANNING  Goal: Discharge to home or other facility with appropriate resources  Description: INTERVENTIONS:  - Identify barriers to discharge w/patient and caregiver  - Arrange for needed discharge resources and transportation as appropriate  - Identify discharge learning needs (meds, wound care, etc )  - Arrange for interpretive services to assist at discharge as needed  - Refer to Case Management Department for coordinating discharge planning if the patient needs post-hospital services based on physician/advanced practitioner order or complex needs related to functional status, cognitive ability, or social support system  Outcome: Progressing     Problem: Knowledge Deficit  Goal: Patient/family/caregiver demonstrates understanding of disease process, treatment plan, medications, and discharge instructions  Description: Complete learning assessment and assess knowledge base    Interventions:  - Provide teaching at level of understanding  - Provide teaching via preferred learning methods  Outcome: Progressing     Problem: SKIN/TISSUE INTEGRITY - ADULT  Goal: Skin Integrity remains intact(Skin Breakdown Prevention)  Description: Assess:  -Perform Michael assessment every   -Clean and moisturize skin every   -Inspect skin when repositioning, toileting, and assisting with ADLS  -Assess under medical devices such as  every   -Assess extremities for adequate circulation and sensation     Bed Management:  -Have minimal linens on bed & keep smooth, unwrinkled  -Change linens as needed when moist or perspiring  -Avoid sitting or lying in one position for more than  hours while in bed  -Keep HOB at degrees     Toileting:  -Offer bedside commode  -Assess for incontinence every   -Use incontinent care products after each incontinent episode such as     Activity:  -Mobilize patient  times a day  -Encourage activity and walks on unit  -Encourage or provide ROM exercises   -Turn and reposition patient every  Hours  -Use appropriate equipment to lift or move patient in bed  -Instruct/ Assist with weight shifting every  when out of bed in chair  -Consider limitation of chair time  hour intervals    Skin Care:  -Avoid use of baby powder, tape, friction and shearing, hot water or constrictive clothing  -Relieve pressure over bony prominences using   -Do not massage red bony areas    Next Steps:  -Teach patient strategies to minimize risks such as    -Consider consults to  interdisciplinary teams such as   Outcome: Progressing  Goal: Incision(s), wounds(s) or drain site(s) healing without S/S of infection  Description: INTERVENTIONS  - Assess and document dressing, incision, wound bed, drain sites and surrounding tissue  - Provide patient and family education  - Perform skin care/dressing changes every   Outcome: Progressing  Goal: Pressure injury heals and does not worsen  Description: Interventions:  - Implement low air loss mattress or specialty surface (Criteria met)  - Apply silicone foam dressing  - Instruct/assist with weight shifting every  minutes when in chair   - Limit chair time to  hour intervals  - Use special pressure reducing interventions such as  when in chair   - Apply fecal or urinary incontinence containment device   - Perform passive or active ROM every   - Turn and reposition patient & offload bony prominences every  hours   - Utilize friction reducing device or surface for transfers   - Consider consults to  interdisciplinary teams such as   - Use incontinent care products after each incontinent episode such as   - Consider nutrition services referral as needed  Outcome: Progressing     Problem: Prexisting or High Potential for Compromised Skin Integrity  Goal: Skin integrity is maintained or improved  Description: INTERVENTIONS:  - Identify patients at risk for skin breakdown  - Assess and monitor skin integrity  - Assess and monitor nutrition and hydration status  - Monitor labs   - Assess for incontinence   - Turn and reposition patient  - Assist with mobility/ambulation  - Relieve pressure over bony prominences  - Avoid friction and shearing  - Provide appropriate hygiene as needed including keeping skin clean and dry  - Evaluate need for skin moisturizer/barrier cream  - Collaborate with interdisciplinary team   - Patient/family teaching  - Consider wound care consult   Outcome: Progressing     Problem: MOBILITY - ADULT  Goal: Maintain or return to baseline ADL function  Description: INTERVENTIONS:  -  Assess patient's ability to carry out ADLs; assess patient's baseline for ADL function and identify physical deficits which impact ability to perform ADLs (bathing, care of mouth/teeth, toileting, grooming, dressing, etc )  - Assess/evaluate cause of self-care deficits   - Assess range of motion  - Assess patient's mobility; develop plan if impaired  - Assess patient's need for assistive devices and provide as appropriate  - Encourage maximum independence but intervene and supervise when necessary  - Involve family in performance of ADLs  - Assess for home care needs following discharge   - Consider OT consult to assist with ADL evaluation and planning for discharge  - Provide patient education as appropriate  Outcome: Progressing  Goal: Maintains/Returns to pre admission functional level  Description: INTERVENTIONS:  - Perform BMAT or MOVE assessment daily    - Set and communicate daily mobility goal to care team and patient/family/caregiver  - Collaborate with rehabilitation services on mobility goals if consulted  - Perform Range of Motion  times a day  - Reposition patient every  hours    - Dangle patient  times a day  - Stand patient  times a day  - Ambulate patient  times a day  - Out of bed to chair  times a day   - Out of bed for meals  times a day  - Out of bed for toileting  - Record patient progress and toleration of activity level   Outcome: Progressing

## 2023-02-16 NOTE — BRIEF OP NOTE (RAD/CATH)
INTERVENTIONAL RADIOLOGY PROCEDURE NOTE    Date: 2/16/2023    Procedure:   1  Midline abscess drain check  2  Cholecystostomy tube check  Procedure Summary     Date:  Room / Location:     Anesthesia Start:  Anesthesia Stop:     Procedure:  Diagnosis:     Scheduled Providers:  Responsible Provider:     Anesthesia Type: Not recorded ASA Status: Not recorded          Preoperative diagnosis:   1  Sepsis ([facility] )    2  Wound infection    3  Acute kidney injury ([facility] )    4  Sepsis, due to unspecified organism, unspecified whether acute organ dysfunction present ([facility] )    5  Acute renal failure superimposed on stage 3a chronic kidney disease, unspecified acute renal failure type ([facility] )    6  Colostomy prolapse ([facility] )    7  Colon cancer metastasized to liver ([facility] )    8  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)         Postoperative diagnosis: Same  Surgeon: Tatiana El MD     Assistant: None  No qualified resident was available  Blood loss: None    Specimens: None     Findings:   1  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

## 2023-02-16 NOTE — ASSESSMENT & PLAN NOTE
Follows w/ outpatient [facility] onc and palliative care   Extensive abdominal surgical history (11/7 ex lap w/ section 3 liver resection, and development of left upper quadrant hematoma and suspected leak from transverse colon anastomosis complication; 73/27 right hemicolectomy; 11/17 re-exploration w/partial descending colectomy; 12/8 IR percutaneous cholecystostomy tube and hepatectomy abscess drainage; 12/20 IR drains placed for perisplenic abscess)  · Patient does not wish to follow with [facility] oncology anymore as it is too far of a drive; would like to establish with [facility] oncology (already follows with Dr Gem Sarmiento)  · Continue outpatient f/u

## 2023-02-16 NOTE — SEDATION DOCUMENTATION
Injected 5 ml contrast to abscess drainage tube  Able to aspirate all 5 ml contrast plus green, brown drainage

## 2023-02-16 NOTE — ASSESSMENT & PLAN NOTE
Recent Labs     02/14/23  0519 02/15/23  1412 02/16/23  0530   HGB 7 1* 7 5* 6 8*     · With noted hgb of 6 8 today  · No overt bleeding noted  · Transfuse today, consent obtained and in chart  · Recheck HH, transfuse if hgb <7

## 2023-02-16 NOTE — PROGRESS NOTES
Progress Note - Infectious Disease   Sagrario Norman 61 y o  male MRN: [id number]  Unit/Bed#: -01 Encounter: [id number]      Impression/Plan:  1   Systemic inflammatory response syndrome   Leukocytosis and tachycardia   Present on admission   Possible sepsis without a well-defined source   The patient CT chest abdomen and pelvis does not reveal any new source of sepsis   Urinalysis nondiagnostic for UTI   The abdominal wound has been debrided   Unclear significance of the extremely high alkaline phosphatase level with the total bilirubin normal and the remainder of the transaminases not proportionally high   Consideration for the possibility of bacteremia   Right upper quadrant ultrasound without biliary ductal dilatation   Wound culture with ESBL and MRSA   No recurrence of the fever and the white cell count has come down substantially  Patient completed more than a week of treatment for the ESBL and now remains on treatment for the MRSA  -Discontinue vancomycin after last dose today  -Pharmacy follow-up for vancomycin dose management  -No additional ertapenem  -Surgery follow-up  -Source control measures as below  -Supportive care     2   Intra-abdominal abscesses   Status post IR drainage with drains remaining in place but with improved findings on repeat CT scan   Patient completed several weeks of intravenous antibiotics   No evidence of a new abscess   -Antibiotics as above for now  -Surgery follow-up  -Serial exams     3   ESBL E  coli bacteremia/abscess formation   Status post prolonged course of intravenous antibiotics with clearance of the bacteremia  Abscess is improved as above  -No directed antibiotics for this problem  -Drain management     4   Abdominal wound   No overt cellulitis or purulence seen although there is some exudate   Possible mild wound infection that is status post debridement by surgery   ESBL and MRSA    -Local wound care  -Serial abdominal exams  -Surgery follow-up  -Antibiotics as above     5   Metastatic colon cancer   Status post extensive surgical resection with reexploration in the setting of complication     6   Acute kidney injury   Suspect secondary to prerenal issues   No other clear source appreciated   Renal function improving and now stabilized  Renal function waxing and waning  -Recheck BMP  -Volume management  -Dose adjust the antibiotics as needed    Discussed the above antibiotic management plan with the primary service and they agree    Antibiotics:  Vancomycin 7  Antibiotics 10    Subjective:  Patient has no fever, chills, sweats; no nausea, vomiting, diarrhea; no cough, shortness of breath; no pain  No new symptoms  He seems in better spirits  Objective:  Vitals:  Temp:  [97 8 °F (36 6 °C)-98 8 °F (37 1 °C)] 98 2 °F (36 8 °C)  HR:  [84-98] 98  Resp:  [18] 18  BP: ()/(64-99) 122/84  SpO2:  [93 %-100 %] 100 %  Temp (24hrs), Av 3 °F (36 8 °C), Min:97 8 °F (36 6 °C), Max:98 8 °F (37 1 °C)  Current: Temperature: 98 2 °F (36 8 °C)    Physical Exam:   General Appearance:  Alert, interactive, nontoxic, no acute distress  Throat: Oropharynx moist without lesions  Lungs:   Clear to auscultation bilaterally; no wheezes, rhonchi or rales; respirations unlabored   Heart:  RRR; no murmur, rub or gallop   Abdomen:   Soft, non-tender, non-distended, positive bowel sounds  Ostomy with output  Right-sided biliary drain in place  VICTORINO drain in place  Wound dressed with a dry dressing in place  Extremities: No clubbing, cyanosis or edema   Skin: No new rashes or lesions  No draining wounds noted         Labs, Imaging, & Other studies:   All pertinent labs and imaging studies were personally reviewed  Results from last 7 days   Lab Units 23  0530 02/15/23  1412 23  0519   WBC Thousand/uL 10 42* 11 69* 11 12*   HEMOGLOBIN g/dL 6 8* 7 5* 7 1*   PLATELETS Thousands/uL 345 351 325     Results from last 7 days   Lab Units 23  0530 02/15/23  1412 02/14/23  0519 02/13/23  0505 02/12/23  1015   SODIUM mmol/L 133* 131* 132*   < > 130*   POTASSIUM mmol/L 4 0 4 1 3 9   < > 3 8   CHLORIDE mmol/L 103 103 99   < > 97   CO2 mmol/L 22 21 23   < > 25   BUN mg/dL 19 22 21   < > 15   CREATININE mg/dL 1 34* 1 21 1 41*   < > 1 08   EGFR ml/min/1 73sq m 57 65 54   < > 75   CALCIUM mg/dL 7 8* 7 9* 7 7*   < > 7 9*   AST U/L  --  31  --   --  49*   ALT U/L  --  9  --   --  14   ALK PHOS U/L  --  723*  --   --  843*    < > = values in this interval not displayed           Results from last 7 days   Lab Units 02/16/23  0530 02/15/23  1412 02/13/23  0505 02/11/23  0512   PROCALCITONIN ng/ml 0 51* 0 46* 0 75* 0 61*         Results from last 7 days   Lab Units 02/13/23  0505   FERRITIN ng/mL 2,911*

## 2023-02-16 NOTE — PHYSICAL THERAPY NOTE
Physical Therapy Cancellation Note       02/16/23 0729   PT Last Visit   PT Visit Date 02/16/23   Note Type   Note type Cancelled Session   Cancel Reasons Medical status   Additional Comments Chart review performed  At this time, PT treatment session cancelled secondary to critically low hgb level 6 8  PT will follow and provide PT interventions as appropriate           Erasmo Gibson, PT

## 2023-02-16 NOTE — SEDATION DOCUMENTATION
Perc radha tube injected with 8 ml contrast   Able to aspirate all the contrast as well as green, brown colored drainage

## 2023-02-17 LAB
ABO GROUP BLD BPU: NORMAL
ANION GAP SERPL CALCULATED.3IONS-SCNC: 9 MMOL/L (ref 4–13)
BPU ID: NORMAL
BUN SERPL-MCNC: 15 MG/DL (ref 5–25)
CALCIUM SERPL-MCNC: 7.5 MG/DL (ref 8.3–10.1)
CHLORIDE SERPL-SCNC: 107 MMOL/L (ref 96–108)
CO2 SERPL-SCNC: 21 MMOL/L (ref 21–32)
CREAT SERPL-MCNC: 1.27 MG/DL (ref 0.6–1.3)
CROSSMATCH: NORMAL
ERYTHROCYTE [DISTWIDTH] IN BLOOD BY AUTOMATED COUNT: 16.8 % (ref 11.6–15.1)
GFR SERPL CREATININE-BSD FRML MDRD: 61 ML/MIN/1.73SQ M
GLUCOSE SERPL-MCNC: 112 MG/DL (ref 65–140)
GLUCOSE SERPL-MCNC: 138 MG/DL (ref 65–140)
GLUCOSE SERPL-MCNC: 167 MG/DL (ref 65–140)
GLUCOSE SERPL-MCNC: 222 MG/DL (ref 65–140)
GLUCOSE SERPL-MCNC: 227 MG/DL (ref 65–140)
HCT VFR BLD AUTO: 25.7 % (ref 36.5–49.3)
HGB BLD-MCNC: 8.1 G/DL (ref 12–17)
MCH RBC QN AUTO: 26.4 PG (ref 26.8–34.3)
MCHC RBC AUTO-ENTMCNC: 31.5 G/DL (ref 31.4–37.4)
MCV RBC AUTO: 84 FL (ref 82–98)
PLATELET # BLD AUTO: 345 THOUSANDS/UL (ref 149–390)
PMV BLD AUTO: 9.7 FL (ref 8.9–12.7)
POTASSIUM SERPL-SCNC: 3.8 MMOL/L (ref 3.5–5.3)
RBC # BLD AUTO: 3.07 MILLION/UL (ref 3.88–5.62)
SODIUM SERPL-SCNC: 137 MMOL/L (ref 135–147)
UNIT DISPENSE STATUS: NORMAL
UNIT PRODUCT CODE: NORMAL
UNIT PRODUCT VOLUME: 350 ML
UNIT RH: NORMAL
WBC # BLD AUTO: 10.08 THOUSAND/UL (ref 4.31–10.16)

## 2023-02-17 RX ADMIN — INSULIN GLARGINE 5 UNITS: 100 INJECTION, SOLUTION SUBCUTANEOUS at 22:05

## 2023-02-17 RX ADMIN — PANTOPRAZOLE SODIUM 40 MG: 40 TABLET, DELAYED RELEASE ORAL at 09:21

## 2023-02-17 RX ADMIN — SIMETHICONE 80 MG: 80 TABLET, CHEWABLE ORAL at 09:21

## 2023-02-17 RX ADMIN — INSULIN LISPRO 4 UNITS: 100 INJECTION, SOLUTION INTRAVENOUS; SUBCUTANEOUS at 16:30

## 2023-02-17 RX ADMIN — SODIUM CHLORIDE, PRESERVATIVE FREE 10 ML: 5 INJECTION INTRAVENOUS at 09:25

## 2023-02-17 RX ADMIN — INSULIN LISPRO 4 UNITS: 100 INJECTION, SOLUTION INTRAVENOUS; SUBCUTANEOUS at 12:23

## 2023-02-17 RX ADMIN — INSULIN LISPRO 4 UNITS: 100 INJECTION, SOLUTION INTRAVENOUS; SUBCUTANEOUS at 09:24

## 2023-02-17 RX ADMIN — ASPIRIN 81 MG 81 MG: 81 TABLET ORAL at 09:23

## 2023-02-17 RX ADMIN — SODIUM CHLORIDE 75 ML/HR: 0.9 INJECTION, SOLUTION INTRAVENOUS at 20:26

## 2023-02-17 RX ADMIN — SIMETHICONE 80 MG: 80 TABLET, CHEWABLE ORAL at 12:23

## 2023-02-17 RX ADMIN — HYDROMORPHONE HYDROCHLORIDE 0.2 MG: 0.2 INJECTION, SOLUTION INTRAMUSCULAR; INTRAVENOUS; SUBCUTANEOUS at 23:27

## 2023-02-17 RX ADMIN — OXYCODONE HYDROCHLORIDE 5 MG: 5 TABLET ORAL at 22:05

## 2023-02-17 RX ADMIN — TAMSULOSIN HYDROCHLORIDE 0.4 MG: 0.4 CAPSULE ORAL at 16:32

## 2023-02-17 RX ADMIN — SODIUM CHLORIDE 75 ML/HR: 0.9 INJECTION, SOLUTION INTRAVENOUS at 05:53

## 2023-02-17 RX ADMIN — INSULIN LISPRO 1 UNITS: 100 INJECTION, SOLUTION INTRAVENOUS; SUBCUTANEOUS at 09:24

## 2023-02-17 RX ADMIN — DULOXETINE HYDROCHLORIDE 30 MG: 30 CAPSULE, DELAYED RELEASE ORAL at 09:20

## 2023-02-17 RX ADMIN — SIMETHICONE 80 MG: 80 TABLET, CHEWABLE ORAL at 22:05

## 2023-02-17 RX ADMIN — INSULIN LISPRO 2 UNITS: 100 INJECTION, SOLUTION INTRAVENOUS; SUBCUTANEOUS at 12:23

## 2023-02-17 RX ADMIN — SIMETHICONE 80 MG: 80 TABLET, CHEWABLE ORAL at 16:32

## 2023-02-17 RX ADMIN — AMLODIPINE BESYLATE 2.5 MG: 2.5 TABLET ORAL at 09:20

## 2023-02-17 RX ADMIN — COLLAGENASE SANTYL: 250 OINTMENT TOPICAL at 09:24

## 2023-02-17 RX ADMIN — PANTOPRAZOLE SODIUM 40 MG: 40 TABLET, DELAYED RELEASE ORAL at 17:32

## 2023-02-17 RX ADMIN — ATORVASTATIN CALCIUM 40 MG: 40 TABLET, FILM COATED ORAL at 09:21

## 2023-02-17 RX ADMIN — INSULIN LISPRO 2 UNITS: 100 INJECTION, SOLUTION INTRAVENOUS; SUBCUTANEOUS at 16:30

## 2023-02-17 NOTE — UTILIZATION REVIEW
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  Catheter kept in place to bag gravity drainage  Midline abscess drain check showed no residual abscess cavity, however, there remains purulent output  Drain kept in place to bulb suction drainage  Vital Signs:   Date/Time Temp Pulse Resp BP MAP (mmHg) SpO2 O2 Device Patient Position - Orthostatic VS   02/17/23 11:15:44 97 5 °F (36 4 °C) 104 -- 122/82 95 96 % -- --   02/17/23 08:04:51 -- 105 -- 123/82 96 97 % -- --   02/17/23 07:51:18 -- 81 -- 130/79 96 97 % -- --   02/17/23 0700 -- 74 -- 138/77 -- 97 % -- --   02/16/23 21:20:34 99 2 °F (37 3 °C) 83 18 130/78 95 99 % None (Room air) Lying   02/16/23 18:41:39 98 5 °F (36 9 °C) 88 -- 126/76 93 100 % -- --   02/16/23 17:01:23 98 2 °F (36 8 °C) 82 -- 122/74 90 98 % -- --       Pertinent Labs/Diagnostic Results:       Results from last 7 days   Lab Units 02/17/23  0601 02/16/23  2148 02/16/23  0530 02/15/23  1412 02/14/23  0519 02/12/23  0459 02/11/23  0553   WBC Thousand/uL 10 08  --  10 42* 11 69* 11 12*   < > 8 10   HEMOGLOBIN g/dL 8 1* 8 1* 6 8* 7 5* 7 1*   < > 7 4*   HEMATOCRIT % 25 7* 16 9* 23 2* 24 6* 23 2*   < > 23 9*   PLATELETS Thousands/uL 345  --  345 351 325   < > 273   NEUTROS ABS Thousands/µL  --   --  7 18 8 81*  --   --  6 31    < > = values in this interval not displayed           Results from last 7 days   Lab Units 02/17/23  0601 02/16/23  0530 02/15/23  1412 02/14/23  0519 02/13/23  0505   SODIUM mmol/L 137 133* 131* 132* 133*   POTASSIUM mmol/L 3 8 4 0 4 1 3 9 3 9   CHLORIDE mmol/L 107 103 103 99 99   CO2 mmol/L 21 22 21 23 25   ANION GAP mmol/L 9 8 7 10 9   BUN mg/dL 15 19 22 21 20   CREATININE mg/dL 1 27 1 34* 1 21 1 41* 1 25   EGFR ml/min/1 73sq m 61 57 65 54 63   CALCIUM mg/dL 7 5* 7 8* 7 9* 7 7* 7 9*     Results from last 7 days   Lab Units 02/15/23  1412 02/12/23  1015   AST U/L 31 49*   ALT U/L 9 14   ALK PHOS U/L 723* 843*   TOTAL PROTEIN g/dL 6 6 6 8   ALBUMIN g/dL 2 0* 1 2*   TOTAL BILIRUBIN mg/dL 0 60 0 53   BILIRUBIN DIRECT mg/dL  --  0 35*     Results from last 7 days   Lab Units 02/17/23  1113 02/17/23  0918 02/16/23  2039 02/16/23  1812 02/16/23  1122 02/16/23  0739 02/15/23  2116 02/15/23  1759 02/15/23  1114 02/15/23  0721 02/14/23  2058 02/14/23  1609   POC GLUCOSE mg/dl 222* 167* 169* 157* 230* 125 155* 131 220* 120 121 157*     Results from last 7 days   Lab Units 02/17/23  0601 02/16/23  0530 02/15/23  1412 02/14/23  0519 02/13/23  0505 02/12/23  1015 02/11/23  0512   GLUCOSE RANDOM mg/dL 112 104 172* 101 121 212* 119             Results from last 7 days   Lab Units 02/16/23  0530 02/15/23  1412 02/13/23  0505 02/11/23  0512   PROCALCITONIN ng/ml 0 51* 0 46* 0 75* 0 61*       Results from last 7 days   Lab Units 02/13/23  0505   FERRITIN ng/mL 2,911*         Medications:   Scheduled Medications:  amLODIPine, 2 5 mg, Oral, Daily  aspirin, 81 mg, Oral, Daily  atorvastatin, 40 mg, Oral, Daily  collagenase, , Topical, Daily  DULoxetine, 30 mg, Oral, Daily  heparin (porcine), 5,000 Units, Subcutaneous, Q8H SHANNON  insulin glargine, 5 Units, Subcutaneous, HS  insulin lispro, 1-6 Units, Subcutaneous, TID AC  insulin lispro, 1-6 Units, Subcutaneous, HS  insulin lispro, 4 Units, Subcutaneous, TID With Meals  pantoprazole, 40 mg, Oral, BID  simethicone, 80 mg, Oral, 4x Daily (with meals and at bedtime)  sodium chloride (PF), 10 mL, Intravenous, Daily  tamsulosin, 0 4 mg, Oral, Daily With Dinner      Continuous IV Infusions:  sodium chloride, 75 mL/hr, Intravenous, Continuous      PRN Meds:  acetaminophen, 650 mg, Oral, Q4H PRN  HYDROmorphone, 0 2 mg, Intravenous, Q4H PRN  influenza vaccine, 0 5 mL, Intramuscular, Prior to discharge  melatonin, 6 mg, Oral, HS PRN  methocarbamol, 500 mg, Oral, BID PRN  ondansetron, 4 mg, Intravenous, Q4H PRN  oxyCODONE, 5 mg, Oral, Q4H PRN  oxyCODONE, 2 5 mg, Oral, Q4H PRN  pneumococcal 13-valent conjugate vaccine, 0 5 mL, Intramuscular, Prior to discharge        Discharge Plan: D    Network Utilization Review Department  ATTENTION: Please call with any questions or concerns to [phone] and carefully listen to the prompts so that you are directed to the right person  All voicemails are confidential   Harlene Pair all requests for admission clinical reviews, approved or denied determinations and any other requests to dedicated fax number below belonging to the campus where the patient is receiving treatment   List of dedicated fax numbers for the Facilities:  [address] DENIALS (Administrative/Medical Necessity) [phone]   [address] (Maternity/NICU/Pediatrics) [phone]   [address] [phone]   [facility]  [phone]   [address] [address] Medical [address] [place] [address] Shailesh Rosales 28 [phone]   [facility] [place] [address] [facility] [phone]

## 2023-02-17 NOTE — CASE MANAGEMENT
[facility] [address] received request for authorization from Care Manager    Authorization request for: [address] Name:[facility]   JPZ:[id number]  Facility MD: Gabriella Lal     NPI: [id number]  Authorization initiated by contacting insurance: [phone]  Pending Reference #:[id number]  Clinicals submitted via fax#[phone] Attn: Carroll Ramon

## 2023-02-17 NOTE — PLAN OF CARE
Problem: PHYSICAL THERAPY ADULT  Goal: Performs mobility at highest level of function for planned discharge setting  See evaluation for individualized goals  Description: Treatment/Interventions: Functional transfer training, LE strengthening/ROM, Therapeutic exercise, Endurance training, Patient/family training, Bed mobility, Gait training, Spoke to nursing, OT, Family  Equipment Recommended: Sydney Whatley       See flowsheet documentation for full assessment, interventions and recommendations  Outcome: Progressing  Note: Prognosis: Good  Problem List: Decreased strength, Decreased endurance, Impaired balance, Decreased mobility  Assessment: Pt seen for PT treatment session this date with interventions consisting of gait training w/ emphasis on improving pt's ability to ambulate level surfaces x 30' with min A provided by therapist with RW, Therapeutic exercise consisting of: BLE exercises performed seated in recliner and supine and therapeutic activity consisting of training: bed mobility, supine<>sit transfers, sit<>stand transfers and vc and tactile cues for static standing posture faciliation  Pt agreeable to PT treatment session upon arrival, pt found supine in bed w/ HOB elevated, in no apparent distress and responsive  In comparison to previous session, pt with improvements in activity tolerance, endurance and mobility  Post session: pt returned back to recliner, chair alarm engaged, all needs in reach and RN notified of session findings/recommendations  Continue to recommend post acute rehabilitation services at time of d/c in order to maximize pt's functional independence and safety w/ mobility  Pt continues to be functioning below baseline level, and remains limited 2* factors listed above and including continued need for medical management and monitoring, decreased strength and balance resulting in an increased risk for falls, decreased endurance and activity tolerance limiting overall mobility   PT will continue to see pt during current hospitalization in order to address the deficits listed above and provide interventions consistent w/ POC in effort to achieve STGs  Barriers to Discharge: Inaccessible home environment     PT Discharge Recommendation: Post acute rehabilitation services    See flowsheet documentation for full assessment

## 2023-02-17 NOTE — ASSESSMENT & PLAN NOTE
Lab Results   Component Value Date    EGFR 61 02/17/2023    EGFR 57 02/16/2023    EGFR 65 02/15/2023    CREATININE 1 27 02/17/2023    CREATININE 1 34 (H) 02/16/2023    CREATININE 1 21 02/15/2023     · POA with creatinine 2 52; baseline 1 0, improved  · Suspecting possible multifactorial cause in setting of dehydration and sepsis   Also likely Vancomycin contributing  · Avoid nephrotoxic agents

## 2023-02-17 NOTE — PROGRESS NOTES
Progress Note - Infectious Disease   Lillie Kaur 61 y o  male MRN: [id number]  Unit/Bed#: -01 Encounter: [id number]      Impression/Plan:  1   Systemic inflammatory response syndrome   Leukocytosis and tachycardia   Present on admission   Possible sepsis without a well-defined source   The patient CT chest abdomen and pelvis does not reveal any new source of sepsis   Urinalysis nondiagnostic for UTI   The abdominal wound has been debrided   Unclear significance of the extremely high alkaline phosphatase level with the total bilirubin normal and the remainder of the transaminases not proportionally high   Consideration for the possibility of bacteremia   Right upper quadrant ultrasound without biliary ductal dilatation   Wound culture with ESBL and MRSA   No recurrence of the fever and the white cell count has come down substantially   Patient completed more than a week of treatment for the ESBL and now remains on treatment for the MRSA   -No additional antibiotics for now  -Surgery follow-up  -Source control measures as below  -Supportive care     2   Intra-abdominal abscesses   Status post IR drainage with drains remaining in place but with improved findings on repeat CT scan   Patient completed several weeks of intravenous antibiotics   No evidence of a new abscess   -No additional antibiotics for now  -Surgery follow-up  -Serial exams     3   ESBL E  coli bacteremia/abscess formation   Status post prolonged course of intravenous antibiotics with clearance of the bacteremia  Abscess is improved as above  -No directed antibiotics for this problem  -Drain management     4   Abdominal wound   No overt cellulitis or purulence seen although there is some exudate   Possible mild wound infection that is status post debridement by surgery   ESBL and MRSA    -Local wound care  -Serial abdominal exams  -Surgery follow-up  -Antibiotics as above     5   Metastatic colon cancer   Status post extensive surgical resection with reexploration in the setting of complication     6   Acute kidney injury   Suspect secondary to prerenal issues   No other clear source appreciated   Renal function improving and now stabilized  Renal function waxing and waning  -Recheck BMP  -Volume management    Discussed the above management plan with the primary service and they agree with the plan    We will see the patient again 2023 if not discharged  Please call for questions    Antibiotics:  Status post 10 days of antibiotic  Off antibiotics 1    Subjective:  Patient has no fever, chills, sweats; no nausea, vomiting, diarrhea; no cough, shortness of breath; no pain  No new symptoms  Objective:  Vitals:  Temp:  [97 8 °F (36 6 °C)-99 2 °F (37 3 °C)] 99 2 °F (37 3 °C)  HR:  [] 105  Resp:  [17-19] 18  BP: (121-138)/(72-82) 123/82  SpO2:  [97 %-100 %] 97 %  Temp (24hrs), Av 3 °F (36 8 °C), Min:97 8 °F (36 6 °C), Max:99 2 °F (37 3 °C)  Current: Temperature: 99 2 °F (37 3 °C)    Physical Exam:   General Appearance:  Alert, interactive, nontoxic, no acute distress  Throat: Oropharynx moist without lesions  Lungs:   Clear to auscultation bilaterally; no wheezes, rhonchi or rales; respirations unlabored   Heart:  Tachycardic; no murmur, rub or gallop   Abdomen:   Soft, non-tender, non-distended, positive bowel sounds  Ostomy with output  Right-sided biliary drain in place  VICTORINO drain with output  Incision dressed with a dry dressing in place  Extremities: No clubbing, cyanosis or edema   Skin: No new rashes or lesions  No draining wounds noted         Labs, Imaging, & Other studies:   All pertinent labs and imaging studies were personally reviewed  Results from last 7 days   Lab Units 23  0601 23  2148 23  0530 02/15/23  1412   WBC Thousand/uL 10 08  --  10 42* 11 69*   HEMOGLOBIN g/dL 8 1* 8 1* 6 8* 7 5*   PLATELETS Thousands/uL 345  --  345 351     Results from last 7 days   Lab Units 23  0601 02/16/23  0530 02/15/23  1412 02/13/23  0505 02/12/23  1015   SODIUM mmol/L 137 133* 131*   < > 130*   POTASSIUM mmol/L 3 8 4 0 4 1   < > 3 8   CHLORIDE mmol/L 107 103 103   < > 97   CO2 mmol/L 21 22 21   < > 25   BUN mg/dL 15 19 22   < > 15   CREATININE mg/dL 1 27 1 34* 1 21   < > 1 08   EGFR ml/min/1 73sq m 61 57 65   < > 75   CALCIUM mg/dL 7 5* 7 8* 7 9*   < > 7 9*   AST U/L  --   --  31  --  49*   ALT U/L  --   --  9  --  14   ALK PHOS U/L  --   --  723*  --  843*    < > = values in this interval not displayed           Results from last 7 days   Lab Units 02/16/23  0530 02/15/23  1412 02/13/23  0505 02/11/23  0512   PROCALCITONIN ng/ml 0 51* 0 46* 0 75* 0 61*         Results from last 7 days   Lab Units 02/13/23  0505   FERRITIN ng/mL 2,911*

## 2023-02-17 NOTE — PHYSICAL THERAPY NOTE
Physical Therapy Treatment Note    Patient's Name: Tamera Rizzo    Admitting Diagnosis  Abdominal pain [R10 9]  Wound infection [T14  8XXA, L08 9]  Acute kidney injury ([facility] [address] ) [N17 9]  Sepsis, due to unspecified organism, unspecified whether acute organ dysfunction present [facility]) [A41 9]    Problem List  Patient Active Problem List   Diagnosis    Type 2 diabetes mellitus without complication, with long-term current use of insulin ([facility] [address] )    Benign essential hypertension    Mixed hyperlipidemia    Erectile dysfunction    Low testosterone    Obesity (BMI 30-39  9)    Testicular hypogonadism    Incarcerated umbilical hernia    Left ureteral calculus    Type 2 diabetes mellitus with hyperlipidemia (HCC)    Anemia    Hypokalemia    Transaminitis    Thrombocytosis    Iron deficiency anemia, unspecified    Malignant neoplasm of transverse colon (HCC)    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)    Colon cancer metastasized to liver [facility])    Colostomy prolapse (HCC)    Other fatigue    Cervical radiculopathy    Encephalopathy    MR (mitral regurgitation)    ESBL (extended spectrum beta-lactamase) producing bacteria infection    Severe protein-calorie malnutrition (HCC)    Acute on chronic kidney failure (HCC)    Sepsis (HCC)    Abscess    Acute pain    Leukocytosis    Dehiscence of incision    Elevated alkaline phosphatase level    Abnormal CT scan    Hyperkalemia    Hyponatremia    Nephrolithiasis        02/17/23 0748   PT Last Visit   PT Visit Date 02/17/23   Note Type   Note Type Treatment   Pain Assessment   Pain Assessment Tool 0-10   Pain Score No Pain   Restrictions/Precautions   Weight Bearing Precautions Per Order No   Braces or Orthoses Other (Comment)  (none reported)   Other Precautions Contact/isolation; Chair Alarm; Bed Alarm; Fall Risk;Multiple lines   General   Chart Reviewed Yes   Response to Previous Treatment Patient with no complaints from previous session     Family/Caregiver Present No   Cognition Overall Cognitive Status [facility]   Arousal/Participation Alert; Responsive; Cooperative   Attention Within functional limits   Orientation Level Oriented X4   Memory Within functional limits   Following Commands Follows all commands and directions without difficulty   Comments Pt agreeable to PT   Bed Mobility   Supine to Sit 4  Minimal assistance   Additional items Assist x 1;HOB elevated; Bedrails; Increased time required;Verbal cues   Additional Comments Pt received at start of session supine in bed with HOB elevated  Pt reported increased lightheadedness with transition to seated at EOB requiring 2 min seated rest break to recover  Following session, pt remained in recliner with needs met, alarm activated and call bell within reach   Transfers   Sit to Stand 4  Minimal assistance   Additional items Assist x 1; Armrests; Increased time required;Verbal cues   Stand to Sit 4  Minimal assistance   Additional items Assist x 1; Armrests; Increased time required;Verbal cues   Additional Comments with use of RW, VCs for hand placements   Ambulation/Elevation   Gait pattern Decreased foot clearance; Inconsistent farhan; Short stride;Decreased heel strike   Gait Assistance 4  Minimal assist   Additional items Assist x 1;Verbal cues   Assistive Device Rolling walker   Distance 30'   Stair Management Assistance Not tested   Ambulation/Elevation Additional Comments Ambulation distance limiting due to fatigue, SOB and elevated HR   Balance   Static Sitting Fair +   Dynamic Sitting Fair   Static Standing Fair -   Dynamic Standing Fair -   Ambulatory Fair -   Endurance Deficit   Endurance Deficit Yes   Endurance Deficit Description decreased activity tolerance   Activity Tolerance   Activity Tolerance Patient limited by fatigue   Nurse Made Aware RN Maryan   Exercises   Hip Flexion Sitting;20 reps;AROM; Bilateral   Hip Abduction Sitting;20 reps;AROM; Bilateral   Knee AROM Long Arc Quad Sitting;20 reps;AROM; Bilateral   Ankle Pumps Supine;20 reps;AROM; Bilateral   Balance training  Standing with BUE support on RW heel raises x20   Assessment   Prognosis Good   Problem List Decreased strength;Decreased endurance; Impaired balance;Decreased mobility   Assessment Pt seen for PT treatment session this date with interventions consisting of gait training w/ emphasis on improving pt's ability to ambulate level surfaces x 30' with min A provided by therapist with RW, Therapeutic exercise consisting of: BLE exercises performed seated in recliner and supine and therapeutic activity consisting of training: bed mobility, supine<>sit transfers, sit<>stand transfers and vc and tactile cues for static standing posture faciliation  Pt agreeable to PT treatment session upon arrival, pt found supine in bed w/ HOB elevated, in no apparent distress and responsive  In comparison to previous session, pt with improvements in activity tolerance, endurance and mobility  Post session: pt returned back to recliner, chair alarm engaged, all needs in reach and RN notified of session findings/recommendations  Continue to recommend post acute rehabilitation services at time of d/c in order to maximize pt's functional independence and safety w/ mobility  Pt continues to be functioning below baseline level, and remains limited 2* factors listed above and including continued need for medical management and monitoring, decreased strength and balance resulting in an increased risk for falls, decreased endurance and activity tolerance limiting overall mobility  PT will continue to see pt during current hospitalization in order to address the deficits listed above and provide interventions consistent w/ POC in effort to achieve STGs  Barriers to Discharge Inaccessible home environment   Goals   STG Expiration Date 02/27/23   PT Treatment Day 3   Plan   Treatment/Interventions Functional transfer training;LE strengthening/ROM;ADL retraining; Therapeutic exercise; Endurance training;Patient/family training;Bed mobility;Gait training; Compensatory technique education;Spoke to nursing;OT   Progress Progressing toward goals   PT Frequency 3-5x/wk   Recommendation   PT Discharge Recommendation Post acute rehabilitation services   AM-PAC Basic Mobility Inpatient   Turning in Flat Bed Without Bedrails 3   Lying on Back to Sitting on Edge of Flat Bed Without Bedrails 3   Moving Bed to Chair 3   Standing Up From Chair Using Arms 3   Walk in Room 3   Climb 3-5 Stairs With Railing 1   Basic Mobility Inpatient Raw Score 16   Basic Mobility Standardized Score 38 32   Highest Level Of Mobility   JH-HLM Goal 5: Stand one or more mins   JH-HLM Achieved 7: Walk 25 feet or more   Education   Education Provided Mobility training;Assistive device   Patient Reinforcement needed   End of Consult   Patient Position at End of Consult Bedside chair;Bed/Chair alarm activated; All needs within reach       Andre Stinson PT    Time In: 07:48  Time Out: 08:28  Total Time: 40 mins

## 2023-02-17 NOTE — PLAN OF CARE
Problem: PAIN - ADULT  Goal: Verbalizes/displays adequate comfort level or baseline comfort level  Description: Interventions:  - Encourage patient to monitor pain and request assistance  - Assess pain using appropriate pain scale  - Administer analgesics based on type and severity of pain and evaluate response  - Implement non-pharmacological measures as appropriate and evaluate response  - Consider cultural and social influences on pain and pain management  - Notify physician/advanced practitioner if interventions unsuccessful or patient reports new pain  Outcome: Progressing     Problem: INFECTION - ADULT  Goal: Absence or prevention of progression during hospitalization  Description: INTERVENTIONS:  - Assess and monitor for signs and symptoms of infection  - Monitor lab/diagnostic results  - Monitor all insertion sites, i e  indwelling lines, tubes, and drains  - Monitor endotracheal if appropriate and nasal secretions for changes in amount and color  - [place] appropriate cooling/warming therapies per order  - Administer medications as ordered  - Instruct and encourage patient and family to use good hand hygiene technique  - Identify and instruct in appropriate isolation precautions for identified infection/condition  Outcome: Progressing  Goal: Absence of fever/infection during neutropenic period  Description: INTERVENTIONS:  - Monitor WBC    Outcome: Progressing     Problem: SAFETY ADULT  Goal: Patient will remain free of falls  Description: INTERVENTIONS:  - Educate patient/family on patient safety including physical limitations  - Instruct patient to call for assistance with activity   - Consult OT/PT to assist with strengthening/mobility   - Keep Call bell within reach  - Keep bed low and locked with side rails adjusted as appropriate  - Keep care items and personal belongings within reach  - Initiate and maintain comfort rounds  - Make Fall Risk Sign visible to staff  - Offer Toileting every 2 Hours, in advance of need  - Initiate/Maintain bed alarm  - Obtain necessary fall risk management equipment: call bell within reach  - Apply yellow socks and bracelet for high fall risk patients  - Consider moving patient to room near nurses station  Outcome: Progressing  Goal: Maintain or return to baseline ADL function  Description: INTERVENTIONS:  -  Assess patient's ability to carry out ADLs; assess patient's baseline for ADL function and identify physical deficits which impact ability to perform ADLs (bathing, care of mouth/teeth, toileting, grooming, dressing, etc )  - Assess/evaluate cause of self-care deficits   - Assess range of motion  - Assess patient's mobility; develop plan if impaired  - Assess patient's need for assistive devices and provide as appropriate  - Encourage maximum independence but intervene and supervise when necessary  - Involve family in performance of ADLs  - Assess for home care needs following discharge   - Consider OT consult to assist with ADL evaluation and planning for discharge  - Provide patient education as appropriate  Outcome: Progressing  Goal: Maintains/Returns to pre admission functional level  Description: INTERVENTIONS:  - Perform BMAT or MOVE assessment daily    - Set and communicate daily mobility goal to care team and patient/family/caregiver  - Collaborate with rehabilitation services on mobility goals if consulted  - Perform Range of Motion 3 times a day  - Reposition patient every 2 hours    - Dangle patient 3 times a day  - Stand patient 3 times a day  - Ambulate patient 3 times a day  - Out of bed to chair 3 times a day   - Out of bed for meals 3 times a day  - Out of bed for toileting  - Record patient progress and toleration of activity level   Outcome: Progressing     Problem: DISCHARGE PLANNING  Goal: Discharge to home or other facility with appropriate resources  Description: INTERVENTIONS:  - Identify barriers to discharge w/patient and caregiver  - Arrange for needed discharge resources and transportation as appropriate  - Identify discharge learning needs (meds, wound care, etc )  - Arrange for interpretive services to assist at discharge as needed  - Refer to Case Management Department for coordinating discharge planning if the patient needs post-hospital services based on physician/advanced practitioner order or complex needs related to functional status, cognitive ability, or social support system  Outcome: Progressing     Problem: Knowledge Deficit  Goal: Patient/family/caregiver demonstrates understanding of disease process, treatment plan, medications, and discharge instructions  Description: Complete learning assessment and assess knowledge base    Interventions:  - Provide teaching at level of understanding  - Provide teaching via preferred learning methods  Outcome: Progressing     Problem: SKIN/TISSUE INTEGRITY - ADULT  Goal: Skin Integrity remains intact(Skin Breakdown Prevention)  Description: Assess:  -Perform Michael assessment every shift  -Clean and moisturize skin every shift  -Inspect skin when repositioning, toileting, and assisting with ADLS    Bed Management:  -Have minimal linens on bed & keep smooth, unwrinkled  -Change linens as needed when moist or perspiring  -Avoid sitting or lying in one position for more than 2 hours while in bed  -Keep HOB at 30 degrees     Goal: Incision(s), wounds(s) or drain site(s) healing without S/S of infection  Description: INTERVENTIONS  - Assess and document dressing, incision, wound bed, drain sites and surrounding tissue  - Provide patient and family education  - Perform skin care/dressing changes every shift  Outcome: Progressing  Goal: Pressure injury heals and does not worsen  Description: Interventions:  - Implement low air loss mattress or specialty surface (Criteria met)  - Apply silicone foam dressing  - Instruct/assist with weight shifting every 120 minutes when in chair   - Limit chair time to 2 hour intervals  Problem: Prexisting or High Potential for Compromised Skin Integrity  Goal: Skin integrity is maintained or improved  Description: INTERVENTIONS:  - Identify patients at risk for skin breakdown  - Assess and monitor skin integrity  - Assess and monitor nutrition and hydration status  - Monitor labs   - Assess for incontinence   - Turn and reposition patient  - Assist with mobility/ambulation  - Relieve pressure over bony prominences  - Avoid friction and shearing  - Provide appropriate hygiene as needed including keeping skin clean and dry  - Evaluate need for skin moisturizer/barrier cream  - Collaborate with interdisciplinary team   - Patient/family teaching  - Consider wound care consult   Outcome: Progressing     Problem: MOBILITY - ADULT  Goal: Maintain or return to baseline ADL function  Description: INTERVENTIONS:  -  Assess patient's ability to carry out ADLs; assess patient's baseline for ADL function and identify physical deficits which impact ability to perform ADLs (bathing, care of mouth/teeth, toileting, grooming, dressing, etc )  - Assess/evaluate cause of self-care deficits   - Assess range of motion  - Assess patient's mobility; develop plan if impaired  - Assess patient's need for assistive devices and provide as appropriate  - Encourage maximum independence but intervene and supervise when necessary  - Involve family in performance of ADLs  - Assess for home care needs following discharge   - Consider OT consult to assist with ADL evaluation and planning for discharge  - Provide patient education as appropriate  Outcome: Progressing  Goal: Maintains/Returns to pre admission functional level  Description: INTERVENTIONS:  - Perform BMAT or MOVE assessment daily    - Set and communicate daily mobility goal to care team and patient/family/caregiver  - Collaborate with rehabilitation services on mobility goals if consulted  - Perform Range of Motion 3 times a day    - Reposition patient every 2 hours    - Dangle patient 3 times a day  - Stand patient 3 times a day  - Ambulate patient 3 times a day  - Out of bed to chair 3 times a day   - Out of bed for meals 3 times a day  - Out of bed for toileting  - Record patient progress and toleration of activity level   Outcome: Progressing

## 2023-02-17 NOTE — ASSESSMENT & PLAN NOTE
Recent Labs     02/16/23  0530 02/16/23  2148 02/17/23  0601   HGB 6 8* 8 1* 8 1*     · 2/16 received BT 1u PRBC  · No overt bleeding noted  · Monitor CBC, transfuse as needed

## 2023-02-17 NOTE — ASSESSMENT & PLAN NOTE
Noted the development of foul-smelling drainage and increasing pain from his chronic abdominal wound  · As evidenced by tachycardia, tachypnea, and leukocytosis  · In setting of purulent abdominal infection   · Hx of colon CA w/ extensive abd surgical hx as below   · Patient has several abdominal drains in place, with CT showing improvement in the fluid collections at the left liver lobe, perisplenic area, and the left retroperitoneum inferior to the spleen  · Completed IV Ertapenem and IV Vancomycin  · ID consulted; recommendations appreciated   · S/p bedside midline wound debridement + wet dressing 2/8 with general surgery  · S/p IR tube check 2/17/2023, drains kept in place   Discussed with IR, follow up in 1 month  · Awaiting placement

## 2023-02-17 NOTE — OCCUPATIONAL THERAPY NOTE
Occupational Therapy Progress Note     Patient Name: Annamarie WHITNEY Date: 2/17/2023      Problem List  Principal Problem:    Sepsis [facility])  Active Problems:    Type 2 diabetes mellitus without complication, with long-term current use of insulin (HCC)    Benign essential hypertension    Anemia    Colon cancer metastasized to liver [facility])    Acute on chronic kidney failure (HCC)    Elevated alkaline phosphatase level    Abnormal CT scan    Hyperkalemia    Hyponatremia    Nephrolithiasis        02/17/23 1420   OT Last Visit   OT Visit Date 02/17/23   Note Type   Note Type Treatment   Pain Assessment   Pain Assessment Tool 0-10   Pain Score No Pain   Restrictions/Precautions   Weight Bearing Precautions Per Order No   Other Precautions Contact/isolation;Multiple lines; Fall Risk   ADL   Eating Assistance 5  Supervision/Setup   Eating Deficit Setup   Grooming Assistance 5  Supervision/Setup   Grooming Deficit Setup;Supervision/safety; Increased time to complete; Teeth care  (while seated at sink)   UB Bathing Assistance 4  Minimal Assistance   UB Bathing Deficit Setup;Supervision/safety; Increased time to complete  (while seated)   LB Bathing Assistance 3  Moderate Assistance   LB Bathing Deficit Setup;Supervision/safety; Increased time to complete   UB Dressing Assistance 4  Minimal Assistance   UB Dressing Deficit Setup;Supervision/safety; Increased time to complete   LB Dressing Assistance 3  Moderate Assistance   LB Dressing Deficit Setup;Supervision/safety; Increased time to complete; Don/doff R sock; Don/doff L sock  (using crossover technique)   Bed Mobility   Sit to Supine 5  Supervision   Additional items Assist x 1;HOB elevated; Bedrails; Increased time required   Transfers   Sit to Stand 4  Minimal assistance   Additional items Assist x 1; Armrests; Increased time required  (with RW)   Stand to Sit 4  Minimal assistance   Additional items Assist x 1; Armrests; Increased time required   Stand pivot 4  Minimal assistance   Additional items Assist x 1; Increased time required;Verbal cues  (RW)   Functional Mobility   Functional Mobility 4  Minimal assistance   Additional Comments assist x1   Additional items Rolling walker   Subjective   Subjective Pt agreeable to therapy session   Cognition   Overall Cognitive Status [facility]   Arousal/Participation Alert; Cooperative   Attention Within functional limits   Orientation Level Oriented X4   Memory Within functional limits   Following Commands Follows all commands and directions without difficulty   Activity Tolerance   Activity Tolerance Patient limited by fatigue   Medical Staff Made Aware Maryan RN, PCA   Assessment   Assessment Pt seen this date for skilled OT session focused on ADLs, functional transfers and mobility, safety education  The patient was received seated in bed side chair, NAD, PIV access, reva  He participated in functional mobility and transfers, functional ambulation in room, and grooming ADL at the sink  Pt required Minimal Assistance with RW for mobility and transfers  Pt completed grooming ADL while seated at the sink 2/2 limited activity tolerance  At end of session the patient was located supine in bed with call bell in reach and all needs met  Overall the patient remains below his functional baseline, and is primarily limited at this time due to decreased activity tolerance, generalized deconditioning, and mildly impaired balance  Pt is highly motivated to regain functional independence, and participated well throughout the session  OT will continue to follow while acute to address POC  At this time, recommend acute inpatient rehab upon d/c    Plan   Treatment Interventions ADL retraining;Functional transfer training;UE strengthening/ROM; Endurance training   Goal Expiration Date 02/22/23   OT Treatment Day 2   OT Frequency 3-5x/wk   Recommendation   OT Discharge Recommendation Post acute rehabilitation services   Additional Comments  The patient's raw score on the AM-[facility] Daily Activity Inpatient Short Form is 16  A raw score of less than 19 suggests the patient may benefit from discharge to post-acute rehabilitation services  Please refer to the recommendation of the Occupational Therapist for safe discharge planning     AM-PAC Daily Activity Inpatient   Lower Body Dressing 2   Bathing 2   Toileting 2   Upper Body Dressing 3   Grooming 3   Eating 4   Daily Activity Raw Score 16   Daily Activity Standardized Score (Calc for Raw Score >=11) 35 96   AM-[facility] Applied Cognition Inpatient   Following a Speech/Presentation 4   Understanding Ordinary Conversation 4   Taking Medications 4   Remembering Where Things Are Placed or Put Away 4   Remembering List of 4-5 Errands 4   Taking Care of Complicated Tasks 4   Applied Cognition Raw Score 24   Applied Cognition Standardized Score 62 21       Otelia Inch, OTR/L

## 2023-02-17 NOTE — PROGRESS NOTES
[address], [place]  Progress Note - Demetris Li 1965, 61 y o  male MRN: [id number]  Unit/Bed#: -01 Encounter: [id number]  Primary Care Provider: Amos Segundo MD   Date and time admitted to hospital: 2/7/2023  3:01 PM    * Sepsis [facility])  Assessment & Plan  Noted the development of foul-smelling drainage and increasing pain from his chronic abdominal wound  · As evidenced by tachycardia, tachypnea, and leukocytosis  · In setting of purulent abdominal infection   · Hx of colon CA w/ extensive abd surgical hx as below   · Patient has several abdominal drains in place, with CT showing improvement in the fluid collections at the left liver lobe, perisplenic area, and the left retroperitoneum inferior to the spleen  · Completed IV Ertapenem and IV Vancomycin  · ID consulted; recommendations appreciated   · S/p bedside midline wound debridement + wet dressing 2/8 with general surgery  · S/p IR tube check 2/17/2023, drains kept in place  Discussed with IR, follow up in 1 month  · Awaiting placement    Nephrolithiasis  Assessment & Plan  RUQ US revealed 7 mm nonobstructing right intrarenal calculus  No hydronephrosis noted on CT a/p  Asymptomatic at this time     · Flomax  · Intake and output    Abnormal CT scan  Assessment & Plan  CT shows interval decrease in bilateral pleural effusions, however multiple small bilateral lung nodules are seen which are concerning for possible metastatic lung disease  · Patient reports having prior known lung nodules  · Oxygenating appropriately on room air; continue to monitor oxygenation  · Outpatient f/u    Acute on chronic kidney failure [facility])  Assessment & Plan  Lab Results   Component Value Date    EGFR 61 02/17/2023    EGFR 57 02/16/2023    EGFR 65 02/15/2023    CREATININE 1 27 02/17/2023    CREATININE 1 34 (H) 02/16/2023    CREATININE 1 21 02/15/2023     · POA with creatinine 2 52; baseline 1 0, improved  · Suspecting possible multifactorial cause in setting of dehydration and sepsis  Also likely Vancomycin contributing  · Avoid nephrotoxic agents    Colon cancer metastasized to liver [facility])  Assessment & Plan  Follows w/ outpatient [facility] onc and palliative care  Extensive abdominal surgical history (11/7 ex lap w/ section 3 liver resection, and development of left upper quadrant hematoma and suspected leak from transverse colon anastomosis complication; 53/45 right hemicolectomy; 11/17 re-exploration w/partial descending colectomy; 12/8 IR percutaneous cholecystostomy tube and hepatectomy abscess drainage; 12/20 IR drains placed for perisplenic abscess)  · Patient does not wish to follow with [place] oncology anymore as it is too far of a drive; would like to establish with [facility] oncology (already follows with Dr Al Carrizales)  · Continue outpatient f/u    Anemia  Assessment & Plan  Recent Labs     02/16/23  0530 02/16/23  2148 02/17/23  0601   HGB 6 8* 8 1* 8 1*     · 2/16 received BT 1u PRBC  · No overt bleeding noted  · Monitor CBC, transfuse as needed    Benign essential hypertension  Assessment & Plan  BP stable  · Continue prehospital HTN medications  · Monitor BP per unit protocol    Type 2 diabetes mellitus without complication, with long-term current use of insulin (HCC)  Assessment & Plan    Lab Results   Component Value Date    HGBA1C 8 0 (H) 09/26/2022     · Home Lantus 8U qHS and humalog 4U TID w/meals   · Dose reduce to 5U qhs + 4 U TID   · 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  · Hypoglycemia protocol  · Diabetic diet         VTE Pharmacologic Prophylaxis: VTE Score: 6 High Risk (Score >/= 5) - Pharmacological DVT Prophylaxis Ordered: heparin  Sequential Compression Devices Ordered  Patient Centered Rounds: I performed bedside rounds with nursing staff today  Discussions with Specialists or Other Care Team Provider: Case management, ID    Education and Discussions with Family / Patient: Will call wife       Total Time Spent on Date of Encounter in care of patient: 45 minutes This time was spent on one or more of the following: performing physical exam; counseling and coordination of care; obtaining or reviewing history; documenting in the medical record; reviewing/ordering tests, medications or procedures; communicating with other healthcare professionals and discussing with patient's family/caregivers  Current Length of Stay: 10 day(s)  Current Patient Status: Inpatient   Certification Statement: The patient will continue to require additional inpatient hospital stay due to Awaiting placement  Discharge Plan: Stable for discharge once with placement    Code Status: Level 1 - Full Code    Subjective:   Reports he is feeling fine today  Although still generally weak  No chest pain or shortness of breath  No other complaints endorsed at this time  Objective:     Vitals:   Temp (24hrs), Av 2 °F (36 8 °C), Min:97 5 °F (36 4 °C), Max:99 2 °F (37 3 °C)    Temp:  [97 5 °F (36 4 °C)-99 2 °F (37 3 °C)] 97 5 °F (36 4 °C)  HR:  [] 104  Resp:  [17-18] 18  BP: (121-138)/(73-82) 122/82  SpO2:  [96 %-100 %] 96 %  Body mass index is 27 98 kg/m²  Input and Output Summary (last 24 hours): Intake/Output Summary (Last 24 hours) at 2023 1601  Last data filed at 2023 1300  Gross per 24 hour   Intake 482 5 ml   Output 1650 ml   Net -1167 5 ml       Physical Exam:   Physical Exam  Vitals and nursing note reviewed  Constitutional:       General: He is not in acute distress  Appearance: He is well-developed  He is ill-appearing (chronically)  He is not toxic-appearing  HENT:      Head: Normocephalic and atraumatic  Nose: Nose normal       Mouth/Throat:      Mouth: Mucous membranes are moist       Pharynx: Oropharynx is clear  Eyes:      Conjunctiva/sclera: Conjunctivae normal       Pupils: Pupils are equal, round, and reactive to light  Neck:      Vascular: No JVD  Cardiovascular:      Rate and Rhythm: Normal rate and regular rhythm  Pulses: Normal pulses  Pulmonary:      Effort: Pulmonary effort is normal  No respiratory distress  Breath sounds: Normal breath sounds  Abdominal:      General: Bowel sounds are normal       Palpations: Abdomen is soft  Tenderness: There is no abdominal tenderness  There is no guarding  Comments: (+) drains x2, (+) colostomy   Musculoskeletal:         General: No swelling, tenderness or deformity  Normal range of motion  Cervical back: Normal range of motion and neck supple  No rigidity  Lymphadenopathy:      Cervical: No cervical adenopathy  Skin:     General: Skin is warm and dry  Findings: No erythema  Neurological:      General: No focal deficit present  Mental Status: He is alert and oriented to person, place, and time  Mental status is at baseline  Motor: No abnormal muscle tone  Psychiatric:         Mood and Affect: Mood normal          Thought Content: Thought content normal          Additional Data:     Labs:  Results from last 7 days   Lab Units 02/17/23  0601 02/16/23  2148 02/16/23  0530   WBC Thousand/uL 10 08  --  10 42*   HEMOGLOBIN g/dL 8 1*   < > 6 8*   HEMATOCRIT % 25 7*   < > 23 2*   PLATELETS Thousands/uL 345  --  345   NEUTROS PCT %  --   --  67   LYMPHS PCT %  --   --  18   MONOS PCT %  --   --  12   EOS PCT %  --   --  0    < > = values in this interval not displayed  Results from last 7 days   Lab Units 02/17/23  0601 02/16/23  0530 02/15/23  1412   SODIUM mmol/L 137   < > 131*   POTASSIUM mmol/L 3 8   < > 4 1   CHLORIDE mmol/L 107   < > 103   CO2 mmol/L 21   < > 21   BUN mg/dL 15   < > 22   CREATININE mg/dL 1 27   < > 1 21   ANION GAP mmol/L 9   < > 7   CALCIUM mg/dL 7 5*   < > 7 9*   ALBUMIN g/dL  --   --  2 0*   TOTAL BILIRUBIN mg/dL  --   --  0 60   ALK PHOS U/L  --   --  723*   ALT U/L  --   --  9   AST U/L  --   --  31   GLUCOSE RANDOM mg/dL 112   < > 172*    < > = values in this interval not displayed           Results from last 7 days   Lab Units 02/17/23  1113 02/17/23  0918 02/16/23  2039 02/16/23  1812 02/16/23  1122 02/16/23  0739 02/15/23  2116 02/15/23  1759 02/15/23  1114 02/15/23  0721 02/14/23  2058 02/14/23  1609   POC GLUCOSE mg/dl 222* 167* 169* 157* 230* 125 155* 131 220* 120 121 157*         Results from last 7 days   Lab Units 02/16/23  0530 02/15/23  1412 02/13/23  0505 02/11/23  0512   PROCALCITONIN ng/ml 0 51* 0 46* 0 75* 0 61*       Lines/Drains:  Invasive Devices     Central Venous Catheter Line  Duration           Port A Cath 03/10/22 Right Chest 344 days          Drain  Duration           Ileostomy LUQ 92 days    Cholecystostomy Tube 67 days    Abscess Drain Abdomen 35 days                Central Line:  Goal for removal: N/A - Chronic PICC             Imaging: No pertinent imaging reviewed      Recent Cultures (last 7 days):         Last 24 Hours Medication List:   Current Facility-Administered Medications   Medication Dose Route Frequency Provider Last Rate   • acetaminophen  650 mg Oral Q4H PRN Ld Rodriguez PA-C     • amLODIPine  2 5 mg Oral Daily Ldstone Rodriguez PA-C     • aspirin  81 mg Oral Daily [place]     • atorvastatin  40 mg Oral Daily [place]     • collagenase   Topical Daily Luann Lopez PA-C     • DULoxetine  30 mg Oral Daily [place], [place]     • heparin (porcine)  5,000 Units Subcutaneous [facility] Fredi Nelson BarnardNANETTE     • HYDROmorphone  0 2 mg Intravenous Q4H PRN Nelia Ambrosio DO     • influenza vaccine  0 5 mL Intramuscular Prior to discharge Genie Washburn MD     • insulin glargine  5 Units Subcutaneous HS Kiara Wang PA-C     • insulin lispro  1-6 Units Subcutaneous TID AC Ld Rodriguez PA-C     • insulin lispro  1-6 Units Subcutaneous HS Ld Rodriguez PA-C     • insulin lispro  4 Units Subcutaneous TID With Meals Ld Rodriguez PA-C     • melatonin  6 mg Oral HS PRN Patricia Rosario PA-C     • methocarbamol  500 mg Oral BID PRN Marlyce Leslie Nola sanchez PA-C     • ondansetron  4 mg Intravenous Q4H PRN Nelia Kenzie Saraiya, DO     • oxyCODONE  5 mg Oral Q4H PRN Nelia Kenzie Saraiya, DO     • oxyCODONE  2 5 mg Oral Q4H PRN Nelia Kenzie Saraiya, DO     • pantoprazole  40 mg Oral BID Demetrio Ledesma PA-C     • pneumococcal 13-valent conjugate vaccine  0 5 mL Intramuscular Prior to discharge Sirisha Kearns MD     • simethicone  80 mg Oral 4x Daily (with meals and at bedtime) Demetrio Ledesma PA-C     • sodium chloride (PF)  10 mL Intravenous Daily Nelia Kenzie Saraiya, DO     • sodium chloride  75 mL/hr Intravenous Continuous Burt Barry MD 75 mL/hr (02/17/23 9838)   • tamsulosin  0 4 mg Oral Daily [facility] [address] [facility]          Today, Patient Was Seen By: Burt Barry MD    **Please Note: This note may have been constructed using a voice recognition system  **

## 2023-02-17 NOTE — PLAN OF CARE
Problem: OCCUPATIONAL THERAPY ADULT  Goal: Performs self-care activities at highest level of function for planned discharge setting  See evaluation for individualized goals  Description: Treatment Interventions: ADL retraining, Functional transfer training, UE strengthening/ROM, Endurance training        See flowsheet documentation for full assessment, interventions and recommendations  Outcome: Progressing  Note: Limitation: Decreased ADL status  Prognosis: Good  Assessment: Pt seen this date for skilled OT session focused on ADLs, functional transfers and mobility, safety education  The patient was received seated in bed side chair, NAD, PIV access, reva  He participated in functional mobility and transfers, functional ambulation in room, and grooming ADL at the sink  Pt required Minimal Assistance with RW for mobility and transfers  Pt completed grooming ADL while seated at the sink 2/2 limited activity tolerance  At end of session the patient was located supine in bed with call bell in reach and all needs met  Overall the patient remains below his functional baseline, and is primarily limited at this time due to decreased activity tolerance, generalized deconditioning, and mildly impaired balance  Pt is highly motivated to regain functional independence, and participated well throughout the session  OT will continue to follow while acute to address POC   At this time, recommend acute inpatient rehab upon d/c      OT Discharge Recommendation: Post acute rehabilitation services        Viraj Liriano OTR/L

## 2023-02-17 NOTE — PLAN OF CARE
Problem: PAIN - ADULT  Goal: Verbalizes/displays adequate comfort level or baseline comfort level  Description: Interventions:  - Encourage patient to monitor pain and request assistance  - Assess pain using appropriate pain scale  - Administer analgesics based on type and severity of pain and evaluate response  - Implement non-pharmacological measures as appropriate and evaluate response  - Consider cultural and social influences on pain and pain management  - Notify physician/advanced practitioner if interventions unsuccessful or patient reports new pain  Outcome: Progressing     Problem: INFECTION - ADULT  Goal: Absence or prevention of progression during hospitalization  Description: INTERVENTIONS:  - Assess and monitor for signs and symptoms of infection  - Monitor lab/diagnostic results  - Monitor all insertion sites, i e  indwelling lines, tubes, and drains  - Monitor endotracheal if appropriate and nasal secretions for changes in amount and color  - [place] appropriate cooling/warming therapies per order  - Administer medications as ordered  - Instruct and encourage patient and family to use good hand hygiene technique  - Identify and instruct in appropriate isolation precautions for identified infection/condition  Outcome: Progressing  Goal: Absence of fever/infection during neutropenic period  Description: INTERVENTIONS:  - Monitor WBC    Outcome: Progressing     Problem: SAFETY ADULT  Goal: Patient will remain free of falls  Description: INTERVENTIONS:  - Educate patient/family on patient safety including physical limitations  - Instruct patient to call for assistance with activity   - Consult OT/PT to assist with strengthening/mobility   - Keep Call bell within reach  - Keep bed low and locked with side rails adjusted as appropriate  - Keep care items and personal belongings within reach  - Initiate and maintain comfort rounds  - Make Fall Risk Sign visible to staff  - Offer Toileting every  Hours, in advance of need  - Initiate/Maintain alarm  - Obtain necessary fall risk management equipment:   - Apply yellow socks and bracelet for high fall risk patients  - Consider moving patient to room near nurses station  Outcome: Progressing  Goal: Maintain or return to baseline ADL function  Description: INTERVENTIONS:  -  Assess patient's ability to carry out ADLs; assess patient's baseline for ADL function and identify physical deficits which impact ability to perform ADLs (bathing, care of mouth/teeth, toileting, grooming, dressing, etc )  - Assess/evaluate cause of self-care deficits   - Assess range of motion  - Assess patient's mobility; develop plan if impaired  - Assess patient's need for assistive devices and provide as appropriate  - Encourage maximum independence but intervene and supervise when necessary  - Involve family in performance of ADLs  - Assess for home care needs following discharge   - Consider OT consult to assist with ADL evaluation and planning for discharge  - Provide patient education as appropriate  Outcome: Progressing  Goal: Maintains/Returns to pre admission functional level  Description: INTERVENTIONS:  - Perform BMAT or MOVE assessment daily    - Set and communicate daily mobility goal to care team and patient/family/caregiver  - Collaborate with rehabilitation services on mobility goals if consulted  - Perform Range of Motion  times a day  - Reposition patient every  hours    - Dangle patient  times a day  - Stand patient  times a day  - Ambulate patient  times a day  - Out of bed to chair  times a day   - Out of bed for meals  times a day  - Out of bed for toileting  - Record patient progress and toleration of activity level   Outcome: Progressing     Problem: DISCHARGE PLANNING  Goal: Discharge to home or other facility with appropriate resources  Description: INTERVENTIONS:  - Identify barriers to discharge w/patient and caregiver  - Arrange for needed discharge resources and transportation as appropriate  - Identify discharge learning needs (meds, wound care, etc )  - Arrange for interpretive services to assist at discharge as needed  - Refer to Case Management Department for coordinating discharge planning if the patient needs post-hospital services based on physician/advanced practitioner order or complex needs related to functional status, cognitive ability, or social support system  Outcome: Progressing     Problem: Knowledge Deficit  Goal: Patient/family/caregiver demonstrates understanding of disease process, treatment plan, medications, and discharge instructions  Description: Complete learning assessment and assess knowledge base    Interventions:  - Provide teaching at level of understanding  - Provide teaching via preferred learning methods  Outcome: Progressing     Problem: SKIN/TISSUE INTEGRITY - ADULT  Goal: Skin Integrity remains intact(Skin Breakdown Prevention)  Description: Assess:  -Perform Michael assessment every   -Clean and moisturize skin every   -Inspect skin when repositioning, toileting, and assisting with ADLS  -Assess under medical devices such as  every   -Assess extremities for adequate circulation and sensation     Bed Management:  -Have minimal linens on bed & keep smooth, unwrinkled  -Change linens as needed when moist or perspiring  -Avoid sitting or lying in one position for more than  hours while in bed  -Keep HOB at degrees     Toileting:  -Offer bedside commode  -Assess for incontinence every   -Use incontinent care products after each incontinent episode such as     Activity:  -Mobilize patient  times a day  -Encourage activity and walks on unit  -Encourage or provide ROM exercises   -Turn and reposition patient every  Hours  -Use appropriate equipment to lift or move patient in bed  -Instruct/ Assist with weight shifting every  when out of bed in chair  -Consider limitation of chair time  hour intervals    Skin Care:  -Avoid use of baby powder, tape, friction and shearing, hot water or constrictive clothing  -Relieve pressure over bony prominences using   -Do not massage red bony areas    Next Steps:  -Teach patient strategies to minimize risks such as    -Consider consults to  interdisciplinary teams such as   Outcome: Progressing  Goal: Incision(s), wounds(s) or drain site(s) healing without S/S of infection  Description: INTERVENTIONS  - Assess and document dressing, incision, wound bed, drain sites and surrounding tissue  - Provide patient and family education  - Perform skin care/dressing changes every   Outcome: Progressing  Goal: Pressure injury heals and does not worsen  Description: Interventions:  - Implement low air loss mattress or specialty surface (Criteria met)  - Apply silicone foam dressing  - Instruct/assist with weight shifting every  minutes when in chair   - Limit chair time to  hour intervals  - Use special pressure reducing interventions such as  when in chair   - Apply fecal or urinary incontinence containment device   - Perform passive or active ROM every   - Turn and reposition patient & offload bony prominences every  hours   - Utilize friction reducing device or surface for transfers   - Consider consults to  interdisciplinary teams such as   - Use incontinent care products after each incontinent episode such as   - Consider nutrition services referral as needed  Outcome: Progressing     Problem: Prexisting or High Potential for Compromised Skin Integrity  Goal: Skin integrity is maintained or improved  Description: INTERVENTIONS:  - Identify patients at risk for skin breakdown  - Assess and monitor skin integrity  - Assess and monitor nutrition and hydration status  - Monitor labs   - Assess for incontinence   - Turn and reposition patient  - Assist with mobility/ambulation  - Relieve pressure over bony prominences  - Avoid friction and shearing  - Provide appropriate hygiene as needed including keeping skin clean and dry  - Evaluate need for skin moisturizer/barrier cream  - Collaborate with interdisciplinary team   - Patient/family teaching  - Consider wound care consult   Outcome: Progressing     Problem: MOBILITY - ADULT  Goal: Maintain or return to baseline ADL function  Description: INTERVENTIONS:  -  Assess patient's ability to carry out ADLs; assess patient's baseline for ADL function and identify physical deficits which impact ability to perform ADLs (bathing, care of mouth/teeth, toileting, grooming, dressing, etc )  - Assess/evaluate cause of self-care deficits   - Assess range of motion  - Assess patient's mobility; develop plan if impaired  - Assess patient's need for assistive devices and provide as appropriate  - Encourage maximum independence but intervene and supervise when necessary  - Involve family in performance of ADLs  - Assess for home care needs following discharge   - Consider OT consult to assist with ADL evaluation and planning for discharge  - Provide patient education as appropriate  Outcome: Progressing  Goal: Maintains/Returns to pre admission functional level  Description: INTERVENTIONS:  - Perform BMAT or MOVE assessment daily    - Set and communicate daily mobility goal to care team and patient/family/caregiver  - Collaborate with rehabilitation services on mobility goals if consulted  - Perform Range of Motion  times a day  - Reposition patient every  hours  - Dangle patient  times a day  - Stand patient  times a day  - Ambulate patient times a day  - Out of bed to chair  times a day   - Out of bed for meals  times a day  - Out of bed for toileting  - Record patient progress and toleration of activity level   Outcome: Progressing     Problem: Nutrition/Hydration-ADULT  Goal: Nutrient/Hydration intake appropriate for improving, restoring or maintaining nutritional needs  Description: Monitor and assess patient's nutrition/hydration status for malnutrition   Collaborate with interdisciplinary team and initiate plan and interventions as ordered  Monitor patient's weight and dietary intake as ordered or per policy  Utilize nutrition screening tool and intervene as necessary  Determine patient's food preferences and provide high-protein, high-caloric foods as appropriate       INTERVENTIONS:  - Monitor oral intake, urinary output, labs, and treatment plans  - Assess nutrition and hydration status and recommend course of action  - Evaluate amount of meals eaten  - Assist patient with eating if necessary   - Allow adequate time for meals  - Recommend/ encourage appropriate diets, oral nutritional supplements, and vitamin/mineral supplements  - Order, calculate, and assess calorie counts as needed  - Recommend, monitor, and adjust tube feedings and TPN/PPN based on assessed needs  - Assess need for intravenous fluids  - Provide specific nutrition/hydration education as appropriate  - Include patient/family/caregiver in decisions related to nutrition  Outcome: Progressing

## 2023-02-17 NOTE — ASSESSMENT & PLAN NOTE
Follows w/ outpatient [facility] onc and palliative care   Extensive abdominal surgical history (11/7 ex lap w/ section 3 liver resection, and development of left upper quadrant hematoma and suspected leak from transverse colon anastomosis complication; 59/76 right hemicolectomy; 11/17 re-exploration w/partial descending colectomy; 12/8 IR percutaneous cholecystostomy tube and hepatectomy abscess drainage; 12/20 IR drains placed for perisplenic abscess)  · Patient does not wish to follow with [facility] oncology anymore as it is too far of a drive; would like to establish with [facility] oncology (already follows with Dr Johanna Velásquez)  · Continue outpatient f/u

## 2023-02-18 LAB
ANION GAP SERPL CALCULATED.3IONS-SCNC: 10 MMOL/L (ref 4–13)
BUN SERPL-MCNC: 16 MG/DL (ref 5–25)
CALCIUM SERPL-MCNC: 7.7 MG/DL (ref 8.3–10.1)
CHLORIDE SERPL-SCNC: 107 MMOL/L (ref 96–108)
CO2 SERPL-SCNC: 21 MMOL/L (ref 21–32)
CREAT SERPL-MCNC: 1.38 MG/DL (ref 0.6–1.3)
ERYTHROCYTE [DISTWIDTH] IN BLOOD BY AUTOMATED COUNT: 17.1 % (ref 11.6–15.1)
GFR SERPL CREATININE-BSD FRML MDRD: 55 ML/MIN/1.73SQ M
GLUCOSE SERPL-MCNC: 132 MG/DL (ref 65–140)
GLUCOSE SERPL-MCNC: 149 MG/DL (ref 65–140)
GLUCOSE SERPL-MCNC: 151 MG/DL (ref 65–140)
GLUCOSE SERPL-MCNC: 165 MG/DL (ref 65–140)
GLUCOSE SERPL-MCNC: 347 MG/DL (ref 65–140)
HCT VFR BLD AUTO: 25.4 % (ref 36.5–49.3)
HGB BLD-MCNC: 7.8 G/DL (ref 12–17)
MCH RBC QN AUTO: 25.9 PG (ref 26.8–34.3)
MCHC RBC AUTO-ENTMCNC: 30.7 G/DL (ref 31.4–37.4)
MCV RBC AUTO: 84 FL (ref 82–98)
PLATELET # BLD AUTO: 341 THOUSANDS/UL (ref 149–390)
PMV BLD AUTO: 10.3 FL (ref 8.9–12.7)
POTASSIUM SERPL-SCNC: 3.6 MMOL/L (ref 3.5–5.3)
RBC # BLD AUTO: 3.01 MILLION/UL (ref 3.88–5.62)
SODIUM SERPL-SCNC: 138 MMOL/L (ref 135–147)
WBC # BLD AUTO: 10.04 THOUSAND/UL (ref 4.31–10.16)

## 2023-02-18 RX ADMIN — AMLODIPINE BESYLATE 2.5 MG: 2.5 TABLET ORAL at 09:08

## 2023-02-18 RX ADMIN — SODIUM CHLORIDE, PRESERVATIVE FREE 10 ML: 5 INJECTION INTRAVENOUS at 09:00

## 2023-02-18 RX ADMIN — INSULIN LISPRO 4 UNITS: 100 INJECTION, SOLUTION INTRAVENOUS; SUBCUTANEOUS at 12:47

## 2023-02-18 RX ADMIN — DULOXETINE HYDROCHLORIDE 30 MG: 30 CAPSULE, DELAYED RELEASE ORAL at 09:08

## 2023-02-18 RX ADMIN — ASPIRIN 81 MG 81 MG: 81 TABLET ORAL at 09:08

## 2023-02-18 RX ADMIN — PANTOPRAZOLE SODIUM 40 MG: 40 TABLET, DELAYED RELEASE ORAL at 17:04

## 2023-02-18 RX ADMIN — INSULIN LISPRO 5 UNITS: 100 INJECTION, SOLUTION INTRAVENOUS; SUBCUTANEOUS at 17:04

## 2023-02-18 RX ADMIN — INSULIN LISPRO 1 UNITS: 100 INJECTION, SOLUTION INTRAVENOUS; SUBCUTANEOUS at 22:16

## 2023-02-18 RX ADMIN — TAMSULOSIN HYDROCHLORIDE 0.4 MG: 0.4 CAPSULE ORAL at 17:04

## 2023-02-18 RX ADMIN — HYDROMORPHONE HYDROCHLORIDE 0.2 MG: 0.2 INJECTION, SOLUTION INTRAMUSCULAR; INTRAVENOUS; SUBCUTANEOUS at 23:37

## 2023-02-18 RX ADMIN — PANTOPRAZOLE SODIUM 40 MG: 40 TABLET, DELAYED RELEASE ORAL at 09:08

## 2023-02-18 RX ADMIN — INSULIN LISPRO 4 UNITS: 100 INJECTION, SOLUTION INTRAVENOUS; SUBCUTANEOUS at 17:04

## 2023-02-18 RX ADMIN — SIMETHICONE 80 MG: 80 TABLET, CHEWABLE ORAL at 12:46

## 2023-02-18 RX ADMIN — SODIUM CHLORIDE 75 ML/HR: 0.9 INJECTION, SOLUTION INTRAVENOUS at 09:24

## 2023-02-18 RX ADMIN — ATORVASTATIN CALCIUM 40 MG: 40 TABLET, FILM COATED ORAL at 09:08

## 2023-02-18 RX ADMIN — COLLAGENASE SANTYL: 250 OINTMENT TOPICAL at 09:00

## 2023-02-18 RX ADMIN — INSULIN LISPRO 4 UNITS: 100 INJECTION, SOLUTION INTRAVENOUS; SUBCUTANEOUS at 08:09

## 2023-02-18 RX ADMIN — INSULIN GLARGINE 5 UNITS: 100 INJECTION, SOLUTION SUBCUTANEOUS at 22:16

## 2023-02-18 RX ADMIN — SIMETHICONE 80 MG: 80 TABLET, CHEWABLE ORAL at 17:04

## 2023-02-18 RX ADMIN — INSULIN LISPRO 1 UNITS: 100 INJECTION, SOLUTION INTRAVENOUS; SUBCUTANEOUS at 08:09

## 2023-02-18 RX ADMIN — SIMETHICONE 80 MG: 80 TABLET, CHEWABLE ORAL at 08:00

## 2023-02-18 RX ADMIN — OXYCODONE HYDROCHLORIDE 5 MG: 5 TABLET ORAL at 22:15

## 2023-02-18 RX ADMIN — SIMETHICONE 80 MG: 80 TABLET, CHEWABLE ORAL at 22:15

## 2023-02-18 RX ADMIN — SODIUM CHLORIDE 75 ML/HR: 0.9 INJECTION, SOLUTION INTRAVENOUS at 21:07

## 2023-02-18 NOTE — CASE MANAGEMENT
Case Management Discharge Planning Note    Patient name Karlee Moreau  Location /-00 MRN [id number]  : 1965 Date 2023       Current Admission Date: 2023  Current Admission Diagnosis:Sepsis [facility])   Patient Active Problem List    Diagnosis Date Noted   • Nephrolithiasis 2023   • Abnormal CT scan 2023   • Hyperkalemia 2023   • Hyponatremia 2023   • Dehiscence of incision 2022   • Elevated alkaline phosphatase level 2022   • Leukocytosis 2022   • Abscess 2022   • Acute pain 2022   • Sepsis ([address] ) 2022   • Severe protein-calorie malnutrition ([address] ) 2022   • Acute on chronic kidney failure ([address] ) 2022   • MR (mitral regurgitation) 2022   • ESBL (extended spectrum beta-lactamase) producing bacteria infection 2022   • Encephalopathy 2022   • Cervical radiculopathy 10/26/2022   • Other fatigue 06/15/2022   • Colostomy prolapse ([address] ) 2022   • Colon cancer metastasized to liver ([facility] ) 2022   • 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 liver ([facility] ) 2022   • Iron deficiency anemia, unspecified 2022   • 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   • Thrombocytosis 2022   • Hypokalemia 2022   • Transaminitis 2022   • Type 2 diabetes mellitus with hyperlipidemia ([address] ) 2022   • Anemia 2022   • Left ureteral calculus 2020   • Incarcerated umbilical hernia    • Testicular hypogonadism 2017   • Low testosterone 2017   • Type 2 diabetes mellitus without complication, with long-term current use of insulin ([facility] ) 2016   • Benign essential hypertension 2016   • Mixed hyperlipidemia 2016   • Erectile dysfunction 2016   • Obesity (BMI 30-39 9) 2016      LOS (days): 10  Geometric Mean LOS (GMLOS) (days):   Days to GMLOS:     OBJECTIVE:  Risk of Unplanned Readmission Score: 44 14      Current admission status: Inpatient   Preferred Pharmacy:   [id number]- [facility] ANJELICA COBOS - [address] [facility] [place] [address]  Phone: [phone] Fax: [address] [facility] [address] [facility] [address] [address]  Phone: [phone] Fax: [address] [place] [address]  Phone: [phone] Fax: [phone]    Primary Care Provider: Lawyer Eduardo MD    Primary Insurance: [employer]  Secondary Insurance:     DISCHARGE DETAILS:  Patient medically cleared for d/c   CM sent update via TT, AIDIN, and by phone to Silvio RABAGO  [facility] regarding admission  [facility] has accepted and is patient's primary preference as he was there recently  Tresea Stabs initiated by CM D/C Support team   [facility] unable to hold bed due to auth  If bed is not available at time of auth, patient can go to [facility]  Patient agreeable to current plan  CM will continue to follow for d/c planning needs

## 2023-02-18 NOTE — PLAN OF CARE
Problem: PAIN - ADULT  Goal: Verbalizes/displays adequate comfort level or baseline comfort level  Description: Interventions:  - Encourage patient to monitor pain and request assistance  - Assess pain using appropriate pain scale  - Administer analgesics based on type and severity of pain and evaluate response  - Implement non-pharmacological measures as appropriate and evaluate response  - Consider cultural and social influences on pain and pain management  - Notify physician/advanced practitioner if interventions unsuccessful or patient reports new pain  2/18/2023 1652 by Dipika Alvarenga RN  Outcome: Progressing  2/18/2023 1651 by Dipika Alvarenga RN  Outcome: Progressing     Problem: INFECTION - ADULT  Goal: Absence or prevention of progression during hospitalization  Description: INTERVENTIONS:  - Assess and monitor for signs and symptoms of infection  - Monitor lab/diagnostic results  - Monitor all insertion sites, i e  indwelling lines, tubes, and drains  - Monitor endotracheal if appropriate and nasal secretions for changes in amount and color  - [place] appropriate cooling/warming therapies per order  - Administer medications as ordered  - Instruct and encourage patient and family to use good hand hygiene technique  - Identify and instruct in appropriate isolation precautions for identified infection/condition  2/18/2023 1652 by Dipika Alvarenga RN  Outcome: Progressing  2/18/2023 1651 by Dipika Alvarenga RN  Outcome: Progressing  Goal: Absence of fever/infection during neutropenic period  Description: INTERVENTIONS:  - Monitor WBC    2/18/2023 1652 by Dipika Alvarenga RN  Outcome: Progressing  2/18/2023 1651 by Dipkia Alvarenga RN  Outcome: Progressing     Problem: SAFETY ADULT  Goal: Patient will remain free of falls  Description: INTERVENTIONS:  - Educate patient/family on patient safety including physical limitations  - Instruct patient to call for assistance with activity   - Consult OT/PT to assist with strengthening/mobility   - Keep Call bell within reach  - Keep bed low and locked with side rails adjusted as appropriate  - Keep care items and personal belongings within reach  - Initiate and maintain comfort rounds  - Make Fall Risk Sign visible to staff  - Offer Toileting every  Hours, in advance of need  - Initiate/Maintain alarm  - Obtain necessary fall risk management equipment:   - Apply yellow socks and bracelet for high fall risk patients  - Consider moving patient to room near nurses station  2/18/2023 1652 by Naeem Lord RN  Outcome: Progressing  2/18/2023 1651 by Naeem Lord RN  Outcome: Progressing  Goal: Maintain or return to baseline ADL function  Description: INTERVENTIONS:  -  Assess patient's ability to carry out ADLs; assess patient's baseline for ADL function and identify physical deficits which impact ability to perform ADLs (bathing, care of mouth/teeth, toileting, grooming, dressing, etc )  - Assess/evaluate cause of self-care deficits   - Assess range of motion  - Assess patient's mobility; develop plan if impaired  - Assess patient's need for assistive devices and provide as appropriate  - Encourage maximum independence but intervene and supervise when necessary  - Involve family in performance of ADLs  - Assess for home care needs following discharge   - Consider OT consult to assist with ADL evaluation and planning for discharge  - Provide patient education as appropriate  2/18/2023 1652 by Naeem Lord RN  Outcome: Progressing  2/18/2023 1651 by Naeem Lord RN  Outcome: Progressing  Goal: Maintains/Returns to pre admission functional level  Description: INTERVENTIONS:  - Perform BMAT or MOVE assessment daily    - Set and communicate daily mobility goal to care team and patient/family/caregiver  - Collaborate with rehabilitation services on mobility goals if consulted  - Perform Range of Motion  times a day    - Reposition patient every  hours  - Dangle patient  times a day  - Stand patient  times a day  - Ambulate patient  times a day  - Out of bed to chair  times a day   - Out of bed for meals  times a day  - Out of bed for toileting  - Record patient progress and toleration of activity level   2/18/2023 1652 by Anusha Corado RN  Outcome: Progressing  2/18/2023 1651 by Anusha Corado RN  Outcome: Progressing     Problem: DISCHARGE PLANNING  Goal: Discharge to home or other facility with appropriate resources  Description: INTERVENTIONS:  - Identify barriers to discharge w/patient and caregiver  - Arrange for needed discharge resources and transportation as appropriate  - Identify discharge learning needs (meds, wound care, etc )  - Arrange for interpretive services to assist at discharge as needed  - Refer to Case Management Department for coordinating discharge planning if the patient needs post-hospital services based on physician/advanced practitioner order or complex needs related to functional status, cognitive ability, or social support system  2/18/2023 1652 by Anusha Corado RN  Outcome: Progressing  2/18/2023 1651 by Anusha Corado RN  Outcome: Progressing     Problem: Knowledge Deficit  Goal: Patient/family/caregiver demonstrates understanding of disease process, treatment plan, medications, and discharge instructions  Description: Complete learning assessment and assess knowledge base    Interventions:  - Provide teaching at level of understanding  - Provide teaching via preferred learning methods  2/18/2023 1652 by Anusha Corado RN  Outcome: Progressing  2/18/2023 1651 by Anusha Corado RN  Outcome: Progressing     Problem: SKIN/TISSUE INTEGRITY - ADULT  Goal: Skin Integrity remains intact(Skin Breakdown Prevention)  Description: Assess:  -Perform Michael assessment every   -Clean and moisturize skin every   -Inspect skin when repositioning, toileting, and assisting with ADLS  -Assess under medical devices such as  every   -Assess extremities for adequate circulation and sensation     Bed Management:  -Have minimal linens on bed & keep smooth, unwrinkled  -Change linens as needed when moist or perspiring  -Avoid sitting or lying in one position for more than  hours while in bed  -Keep HOB at degrees     Toileting:  -Offer bedside commode  -Assess for incontinence every   -Use incontinent care products after each incontinent episode such as     Activity:  -Mobilize patient times a day  -Encourage activity and walks on unit  -Encourage or provide ROM exercises   -Turn and reposition patient every  Hours  -Use appropriate equipment to lift or move patient in bed  -Instruct/ Assist with weight shifting every  when out of bed in chair  -Consider limitation of chair time  hour intervals    Skin Care:  -Avoid use of baby powder, tape, friction and shearing, hot water or constrictive clothing  -Relieve pressure over bony prominences using   -Do not massage red bony areas    Next Steps:  -Teach patient strategies to minimize risks such as    -Consider consults to  interdisciplinary teams such as   2/18/2023 1652 by Liam Moya RN  Outcome: Progressing  2/18/2023 1651 by Liam Moya RN  Outcome: Progressing  Goal: Incision(s), wounds(s) or drain site(s) healing without S/S of infection  Description: INTERVENTIONS  - Assess and document dressing, incision, wound bed, drain sites and surrounding tissue  - Provide patient and family education  - Perform skin care/dressing changes every   2/18/2023 1652 by Liam Moya RN  Outcome: Progressing  2/18/2023 1651 by Liam Moya RN  Outcome: Progressing  Goal: Pressure injury heals and does not worsen  Description: Interventions:  - Implement low air loss mattress or specialty surface (Criteria met)  - Apply silicone foam dressing  - Instruct/assist with weight shifting every  minutes when in chair   - Limit chair time to  hour intervals  - Use special pressure reducing interventions such as  when in chair   - Apply fecal or urinary incontinence containment device   - Perform passive or active ROM every   - Turn and reposition patient & offload bony prominences every  hours   - Utilize friction reducing device or surface for transfers   - Consider consults to  interdisciplinary teams such as   - Use incontinent care products after each incontinent episode such as   - Consider nutrition services referral as needed  2/18/2023 1652 by Mark Oh RN  Outcome: Progressing  2/18/2023 1651 by Mark Oh RN  Outcome: Progressing     Problem: Prexisting or High Potential for Compromised Skin Integrity  Goal: Skin integrity is maintained or improved  Description: INTERVENTIONS:  - Identify patients at risk for skin breakdown  - Assess and monitor skin integrity  - Assess and monitor nutrition and hydration status  - Monitor labs   - Assess for incontinence   - Turn and reposition patient  - Assist with mobility/ambulation  - Relieve pressure over bony prominences  - Avoid friction and shearing  - Provide appropriate hygiene as needed including keeping skin clean and dry  - Evaluate need for skin moisturizer/barrier cream  - Collaborate with interdisciplinary team   - Patient/family teaching  - Consider wound care consult   2/18/2023 1652 by Mark Oh RN  Outcome: Progressing  2/18/2023 1651 by Mark Oh RN  Outcome: Progressing     Problem: MOBILITY - ADULT  Goal: Maintain or return to baseline ADL function  Description: INTERVENTIONS:  -  Assess patient's ability to carry out ADLs; assess patient's baseline for ADL function and identify physical deficits which impact ability to perform ADLs (bathing, care of mouth/teeth, toileting, grooming, dressing, etc )  - Assess/evaluate cause of self-care deficits   - Assess range of motion  - Assess patient's mobility; develop plan if impaired  - Assess patient's need for assistive devices and provide as appropriate  - Encourage maximum independence but intervene and supervise when necessary  - Involve family in performance of ADLs  - Assess for home care needs following discharge   - Consider OT consult to assist with ADL evaluation and planning for discharge  - Provide patient education as appropriate  2/18/2023 1652 by Stephanie Colvin RN  Outcome: Progressing  2/18/2023 1651 by Stephanie Colvin RN  Outcome: Progressing  Goal: Maintains/Returns to pre admission functional level  Description: INTERVENTIONS:  - Perform BMAT or MOVE assessment daily    - Set and communicate daily mobility goal to care team and patient/family/caregiver  - Collaborate with rehabilitation services on mobility goals if consulted  - Perform Range of Motion  times a day  - Reposition patient every  hours  - Dangle patient  times a day  - Stand patient  times a day  - Ambulate patient  times a day  - Out of bed to chair  times a day   - Out of bed for meals  times a day  - Out of bed for toileting  - Record patient progress and toleration of activity level   2/18/2023 1652 by Stephanie Colvin RN  Outcome: Progressing  2/18/2023 1651 by Stephanie Colvin RN  Outcome: Progressing     Problem: Nutrition/Hydration-ADULT  Goal: Nutrient/Hydration intake appropriate for improving, restoring or maintaining nutritional needs  Description: Monitor and assess patient's nutrition/hydration status for malnutrition  Collaborate with interdisciplinary team and initiate plan and interventions as ordered  Monitor patient's weight and dietary intake as ordered or per policy  Utilize nutrition screening tool and intervene as necessary  Determine patient's food preferences and provide high-protein, high-caloric foods as appropriate       INTERVENTIONS:  - Monitor oral intake, urinary output, labs, and treatment plans  - Assess nutrition and hydration status and recommend course of action  - Evaluate amount of meals eaten  - Assist patient with eating if necessary   - Allow adequate time for meals  - Recommend/ encourage appropriate diets, oral nutritional supplements, and vitamin/mineral supplements  - Order, calculate, and assess calorie counts as needed  - Recommend, monitor, and adjust tube feedings and TPN/PPN based on assessed needs  - Assess need for intravenous fluids  - Provide specific nutrition/hydration education as appropriate  - Include patient/family/caregiver in decisions related to nutrition  2/18/2023 1652 by Mary Landa RN  Outcome: Progressing  2/18/2023 1651 by Mary Landa RN  Outcome: Progressing

## 2023-02-18 NOTE — ASSESSMENT & PLAN NOTE
Recent Labs     02/16/23  0530 02/16/23  2148 02/17/23  0601   HGB 6 8* 8 1* 8 1*     · 2/16 received BT 1u PRBC  · No overt bleeding noted  · Monitor CBC, transfuse as needed  · Awaiting CBC this morning

## 2023-02-18 NOTE — PROGRESS NOTES
Patient alert and oriented x 4  Pleasant to staff and cooperative with care  Patient slept during shift  PRN oxycodone and dilaudid administered to patient per request  Patients out put measured and recorded  Able to verbalize needs and obey commands with limitations  Patient tolerated medication  Expressed feeling "better" emotionally yesterday  Support system present  No deviation in assessment finding noted when compared to previous documentation  Will continue to monitor

## 2023-02-18 NOTE — ASSESSMENT & PLAN NOTE
Lab Results   Component Value Date    EGFR 61 02/17/2023    EGFR 57 02/16/2023    EGFR 65 02/15/2023    CREATININE 1 27 02/17/2023    CREATININE 1 34 (H) 02/16/2023    CREATININE 1 21 02/15/2023     · POA with creatinine 2 52; baseline 1 0, improved  · Suspecting possible multifactorial cause in setting of dehydration and sepsis   Also likely Vancomycin contributing  · Avoid nephrotoxic agents  · Follow-up BMP this morning

## 2023-02-18 NOTE — PLAN OF CARE
Problem: PAIN - ADULT  Goal: Verbalizes/displays adequate comfort level or baseline comfort level  Description: Interventions:  - Encourage patient to monitor pain and request assistance  - Assess pain using appropriate pain scale  - Administer analgesics based on type and severity of pain and evaluate response  - Implement non-pharmacological measures as appropriate and evaluate response  - Consider cultural and social influences on pain and pain management  - Notify physician/advanced practitioner if interventions unsuccessful or patient reports new pain  Outcome: Progressing     Problem: INFECTION - ADULT  Goal: Absence or prevention of progression during hospitalization  Description: INTERVENTIONS:  - Assess and monitor for signs and symptoms of infection  - Monitor lab/diagnostic results  - Monitor all insertion sites, i e  indwelling lines, tubes, and drains  - Monitor endotracheal if appropriate and nasal secretions for changes in amount and color  - [place] appropriate cooling/warming therapies per order  - Administer medications as ordered  - Instruct and encourage patient and family to use good hand hygiene technique  - Identify and instruct in appropriate isolation precautions for identified infection/condition  Outcome: Progressing  Goal: Absence of fever/infection during neutropenic period  Description: INTERVENTIONS:  - Monitor WBC    Outcome: Progressing     Problem: SAFETY ADULT  Goal: Patient will remain free of falls  Description: INTERVENTIONS:  - Educate patient/family on patient safety including physical limitations  - Instruct patient to call for assistance with activity   - Consult OT/PT to assist with strengthening/mobility   - Keep Call bell within reach  - Keep bed low and locked with side rails adjusted as appropriate  - Keep care items and personal belongings within reach  - Initiate and maintain comfort rounds  - Make Fall Risk Sign visible to staff  - Offer Toileting every  Hours, in advance of need  - Initiate/Maintain alarm  - Obtain necessary fall risk management equipment:   - Apply yellow socks and bracelet for high fall risk patients  - Consider moving patient to room near nurses station  Outcome: Progressing  Goal: Maintain or return to baseline ADL function  Description: INTERVENTIONS:  -  Assess patient's ability to carry out ADLs; assess patient's baseline for ADL function and identify physical deficits which impact ability to perform ADLs (bathing, care of mouth/teeth, toileting, grooming, dressing, etc )  - Assess/evaluate cause of self-care deficits   - Assess range of motion  - Assess patient's mobility; develop plan if impaired  - Assess patient's need for assistive devices and provide as appropriate  - Encourage maximum independence but intervene and supervise when necessary  - Involve family in performance of ADLs  - Assess for home care needs following discharge   - Consider OT consult to assist with ADL evaluation and planning for discharge  - Provide patient education as appropriate  Outcome: Progressing  Goal: Maintains/Returns to pre admission functional level  Description: INTERVENTIONS:  - Perform BMAT or MOVE assessment daily    - Set and communicate daily mobility goal to care team and patient/family/caregiver  - Collaborate with rehabilitation services on mobility goals if consulted  - Perform Range of Motion  times a day  - Reposition patient every  hours    - Dangle patient  times a day  - Stand patient  times a day  - Ambulate patient  times a day  - Out of bed to chair  times a day   - Out of bed for meals  times a day  - Out of bed for toileting  - Record patient progress and toleration of activity level   Outcome: Progressing     Problem: DISCHARGE PLANNING  Goal: Discharge to home or other facility with appropriate resources  Description: INTERVENTIONS:  - Identify barriers to discharge w/patient and caregiver  - Arrange for needed discharge resources and transportation as appropriate  - Identify discharge learning needs (meds, wound care, etc )  - Arrange for interpretive services to assist at discharge as needed  - Refer to Case Management Department for coordinating discharge planning if the patient needs post-hospital services based on physician/advanced practitioner order or complex needs related to functional status, cognitive ability, or social support system  Outcome: Progressing     Problem: Knowledge Deficit  Goal: Patient/family/caregiver demonstrates understanding of disease process, treatment plan, medications, and discharge instructions  Description: Complete learning assessment and assess knowledge base    Interventions:  - Provide teaching at level of understanding  - Provide teaching via preferred learning methods  Outcome: Progressing     Problem: SKIN/TISSUE INTEGRITY - ADULT  Goal: Skin Integrity remains intact(Skin Breakdown Prevention)  Description: Assess:  -Perform Michael assessment every   -Clean and moisturize skin every   -Inspect skin when repositioning, toileting, and assisting with ADLS  -Assess under medical devices such as  every   -Assess extremities for adequate circulation and sensation     Bed Management:  -Have minimal linens on bed & keep smooth, unwrinkled  -Change linens as needed when moist or perspiring  -Avoid sitting or lying in one position for more than  hours while in bed  -Keep HOB at degrees     Toileting:  -Offer bedside commode  -Assess for incontinence every   -Use incontinent care products after each incontinent episode such as     Activity:  -Mobilize patient  times a day  -Encourage activity and walks on unit  -Encourage or provide ROM exercises   -Turn and reposition patient every  Hours  -Use appropriate equipment to lift or move patient in bed  -Instruct/ Assist with weight shifting every  when out of bed in chair  -Consider limitation of chair time  hour intervals    Skin Care:  -Avoid use of baby powder, tape, friction and shearing, hot water or constrictive clothing  -Relieve pressure over bony prominences using   -Do not massage red bony areas    Next Steps:  -Teach patient strategies to minimize risks such as    -Consider consults to  interdisciplinary teams such as   Outcome: Progressing  Goal: Incision(s), wounds(s) or drain site(s) healing without S/S of infection  Description: INTERVENTIONS  - Assess and document dressing, incision, wound bed, drain sites and surrounding tissue  - Provide patient and family education  - Perform skin care/dressing changes every   Outcome: Progressing  Goal: Pressure injury heals and does not worsen  Description: Interventions:  - Implement low air loss mattress or specialty surface (Criteria met)  - Apply silicone foam dressing  - Instruct/assist with weight shifting every  minutes when in chair   - Limit chair time to  hour intervals  - Use special pressure reducing interventions such as  when in chair   - Apply fecal or urinary incontinence containment device   - Perform passive or active ROM every   - Turn and reposition patient & offload bony prominences every  hours   - Utilize friction reducing device or surface for transfers   - Consider consults to  interdisciplinary teams such as   - Use incontinent care products after each incontinent episode such as   - Consider nutrition services referral as needed  Outcome: Progressing     Problem: Prexisting or High Potential for Compromised Skin Integrity  Goal: Skin integrity is maintained or improved  Description: INTERVENTIONS:  - Identify patients at risk for skin breakdown  - Assess and monitor skin integrity  - Assess and monitor nutrition and hydration status  - Monitor labs   - Assess for incontinence   - Turn and reposition patient  - Assist with mobility/ambulation  - Relieve pressure over bony prominences  - Avoid friction and shearing  - Provide appropriate hygiene as needed including keeping skin clean and dry  - Evaluate need for skin moisturizer/barrier cream  - Collaborate with interdisciplinary team   - Patient/family teaching  - Consider wound care consult   Outcome: Progressing     Problem: MOBILITY - ADULT  Goal: Maintain or return to baseline ADL function  Description: INTERVENTIONS:  -  Assess patient's ability to carry out ADLs; assess patient's baseline for ADL function and identify physical deficits which impact ability to perform ADLs (bathing, care of mouth/teeth, toileting, grooming, dressing, etc )  - Assess/evaluate cause of self-care deficits   - Assess range of motion  - Assess patient's mobility; develop plan if impaired  - Assess patient's need for assistive devices and provide as appropriate  - Encourage maximum independence but intervene and supervise when necessary  - Involve family in performance of ADLs  - Assess for home care needs following discharge   - Consider OT consult to assist with ADL evaluation and planning for discharge  - Provide patient education as appropriate  Outcome: Progressing  Goal: Maintains/Returns to pre admission functional level  Description: INTERVENTIONS:  - Perform BMAT or MOVE assessment daily    - Set and communicate daily mobility goal to care team and patient/family/caregiver  - Collaborate with rehabilitation services on mobility goals if consulted  - Perform Range of Motion  times a day  - Reposition patient every  hours  - Dangle patient  times a day  - Stand patient  times a day  - Ambulate patient  times a day  - Out of bed to chair  times a day   - Out of bed for meals  times a day  - Out of bed for toileting  - Record patient progress and toleration of activity level   Outcome: Progressing     Problem: Nutrition/Hydration-ADULT  Goal: Nutrient/Hydration intake appropriate for improving, restoring or maintaining nutritional needs  Description: Monitor and assess patient's nutrition/hydration status for malnutrition   Collaborate with interdisciplinary team and initiate plan and interventions as ordered  Monitor patient's weight and dietary intake as ordered or per policy  Utilize nutrition screening tool and intervene as necessary  Determine patient's food preferences and provide high-protein, high-caloric foods as appropriate       INTERVENTIONS:  - Monitor oral intake, urinary output, labs, and treatment plans  - Assess nutrition and hydration status and recommend course of action  - Evaluate amount of meals eaten  - Assist patient with eating if necessary   - Allow adequate time for meals  - Recommend/ encourage appropriate diets, oral nutritional supplements, and vitamin/mineral supplements  - Order, calculate, and assess calorie counts as needed  - Recommend, monitor, and adjust tube feedings and TPN/PPN based on assessed needs  - Assess need for intravenous fluids  - Provide specific nutrition/hydration education as appropriate  - Include patient/family/caregiver in decisions related to nutrition  Outcome: Progressing

## 2023-02-18 NOTE — PLAN OF CARE
Problem: PAIN - ADULT  Goal: Verbalizes/displays adequate comfort level or baseline comfort level  Description: Interventions:  - Encourage patient to monitor pain and request assistance  - Assess pain using appropriate pain scale  - Administer analgesics based on type and severity of pain and evaluate response  - Implement non-pharmacological measures as appropriate and evaluate response  - Consider cultural and social influences on pain and pain management  - Notify physician/advanced practitioner if interventions unsuccessful or patient reports new pain  2/18/2023 1655 by Aiyana Moore RN  Outcome: Progressing  2/18/2023 1652 by Aiyana Moore RN  Outcome: Progressing  2/18/2023 1651 by Aiyana Moore RN  Outcome: Progressing     Problem: INFECTION - ADULT  Goal: Absence or prevention of progression during hospitalization  Description: INTERVENTIONS:  - Assess and monitor for signs and symptoms of infection  - Monitor lab/diagnostic results  - Monitor all insertion sites, i e  indwelling lines, tubes, and drains  - Monitor endotracheal if appropriate and nasal secretions for changes in amount and color  - [place] appropriate cooling/warming therapies per order  - Administer medications as ordered  - Instruct and encourage patient and family to use good hand hygiene technique  - Identify and instruct in appropriate isolation precautions for identified infection/condition  2/18/2023 1655 by Aiyana Moore RN  Outcome: Progressing  2/18/2023 [address] by Aiyana Moore RN  Outcome: Progressing  2/18/2023 1651 by Aiyana Moore RN  Outcome: Progressing  Goal: Absence of fever/infection during neutropenic period  Description: INTERVENTIONS:  - Monitor WBC    2/18/2023 1655 by Aiyana Moore RN  Outcome: Progressing  2/18/2023 [address] by Aiyana Moore RN  Outcome: Progressing  2/18/2023 1651 by Aiyana Moore RN  Outcome: Progressing     Problem: SAFETY ADULT  Goal: Patient will remain free of falls  Description: INTERVENTIONS:  - Educate patient/family on patient safety including physical limitations  - Instruct patient to call for assistance with activity   - Consult OT/PT to assist with strengthening/mobility   - Keep Call bell within reach  - Keep bed low and locked with side rails adjusted as appropriate  - Keep care items and personal belongings within reach  - Initiate and maintain comfort rounds  - Make Fall Risk Sign visible to staff  - Offer Toileting every  Hours, in advance of need  - Initiate/Maintain alarm  - Obtain necessary fall risk management equipment:   - Apply yellow socks and bracelet for high fall risk patients  - Consider moving patient to room near nurses station  2/18/2023 1655 by Harley Dalton RN  Outcome: Progressing  2/18/2023 1652 by Harley Dalton RN  Outcome: Progressing  2/18/2023 1651 by Harley Dalton RN  Outcome: Progressing  Goal: Maintain or return to baseline ADL function  Description: INTERVENTIONS:  -  Assess patient's ability to carry out ADLs; assess patient's baseline for ADL function and identify physical deficits which impact ability to perform ADLs (bathing, care of mouth/teeth, toileting, grooming, dressing, etc )  - Assess/evaluate cause of self-care deficits   - Assess range of motion  - Assess patient's mobility; develop plan if impaired  - Assess patient's need for assistive devices and provide as appropriate  - Encourage maximum independence but intervene and supervise when necessary  - Involve family in performance of ADLs  - Assess for home care needs following discharge   - Consider OT consult to assist with ADL evaluation and planning for discharge  - Provide patient education as appropriate  2/18/2023 1655 by Harley Dalton RN  Outcome: Progressing  2/18/2023 [address] by Harlye Dalton RN  Outcome: Progressing  2/18/2023 1651 by Harley Dalton RN  Outcome: Progressing  Goal: Maintains/Returns to pre admission functional level  Description: INTERVENTIONS:  - Perform BMAT or MOVE assessment daily    - Set and communicate daily mobility goal to care team and patient/family/caregiver  - Collaborate with rehabilitation services on mobility goals if consulted  - Perform Range of Motion  times a day  - Reposition patient every  hours  - Dangle patient  times a day  - Stand patient  times a day  - Ambulate patient  times a day  - Out of bed to chair  times a day   - Out of bed for meals  times a day  - Out of bed for toileting  - Record patient progress and toleration of activity level   2/18/2023 1655 by Gricelda Sheriff RN  Outcome: Progressing  2/18/2023 1652 by Grieclda Sheriff RN  Outcome: Progressing  2/18/2023 1651 by Gricelda Sheriff RN  Outcome: Progressing     Problem: DISCHARGE PLANNING  Goal: Discharge to home or other facility with appropriate resources  Description: INTERVENTIONS:  - Identify barriers to discharge w/patient and caregiver  - Arrange for needed discharge resources and transportation as appropriate  - Identify discharge learning needs (meds, wound care, etc )  - Arrange for interpretive services to assist at discharge as needed  - Refer to Case Management Department for coordinating discharge planning if the patient needs post-hospital services based on physician/advanced practitioner order or complex needs related to functional status, cognitive ability, or social support system  2/18/2023 1655 by Gricelda Sheriff RN  Outcome: Progressing  2/18/2023 [address] [facility] by Gricelda Sheriff RN  Outcome: Progressing  2/18/2023 1651 by Gricelda Sheriff RN  Outcome: Progressing     Problem: Knowledge Deficit  Goal: Patient/family/caregiver demonstrates understanding of disease process, treatment plan, medications, and discharge instructions  Description: Complete learning assessment and assess knowledge base    Interventions:  - Provide teaching at level of understanding  - Provide teaching via preferred learning methods  2/18/2023 1655 by Stephanie Colvin RN  Outcome: Progressing  2/18/2023 1652 by Stephanie Colvin RN  Outcome: Progressing  2/18/2023 1651 by Stephanie Colvin RN  Outcome: Progressing     Problem: SKIN/TISSUE INTEGRITY - ADULT  Goal: Skin Integrity remains intact(Skin Breakdown Prevention)  Description: Assess:  -Perform Michael assessment every   -Clean and moisturize skin every   -Inspect skin when repositioning, toileting, and assisting with ADLS  -Assess under medical devices such as  every   -Assess extremities for adequate circulation and sensation     Bed Management:  -Have minimal linens on bed & keep smooth, unwrinkled  -Change linens as needed when moist or perspiring  -Avoid sitting or lying in one position for more than  hours while in bed  -Keep HOB at degrees     Toileting:  -Offer bedside commode  -Assess for incontinence every   -Use incontinent care products after each incontinent episode such as     Activity:  -Mobilize patient  times a day  -Encourage activity and walks on unit  -Encourage or provide ROM exercises   -Turn and reposition patient every  Hours  -Use appropriate equipment to lift or move patient in bed  -Instruct/ Assist with weight shifting every  when out of bed in chair  -Consider limitation of chair time  hour intervals    Skin Care:  -Avoid use of baby powder, tape, friction and shearing, hot water or constrictive clothing  -Relieve pressure over bony prominences using   -Do not massage red bony areas    Next Steps:  -Teach patient strategies to minimize risks such as    -Consider consults to  interdisciplinary teams such as   2/18/2023 1655 by Stephanie Colvin RN  Outcome: Progressing  2/18/2023 1652 by Stephanie Colvin RN  Outcome: Progressing  2/18/2023 1651 by Stephanie Colvin RN  Outcome: Progressing  Goal: Incision(s), wounds(s) or drain site(s) healing without S/S of infection  Description: INTERVENTIONS  - Assess and document dressing, incision, wound bed, drain sites and surrounding tissue  - Provide patient and family education  - Perform skin care/dressing changes every   2/18/2023 1655 by Leticia Rodriguez RN  Outcome: Progressing  2/18/2023 1652 by Leticia Rodriguez RN  Outcome: Progressing  2/18/2023 1651 by Leticia Rodriguez RN  Outcome: Progressing  Goal: Pressure injury heals and does not worsen  Description: Interventions:  - Implement low air loss mattress or specialty surface (Criteria met)  - Apply silicone foam dressing  - Instruct/assist with weight shifting every  minutes when in chair   - Limit chair time to  hour intervals  - Use special pressure reducing interventions such as  when in chair   - Apply fecal or urinary incontinence containment device   - Perform passive or active ROM every   - Turn and reposition patient & offload bony prominences every  hours   - Utilize friction reducing device or surface for transfers   - Consider consults to  interdisciplinary teams such as   - Use incontinent care products after each incontinent episode such as   - Consider nutrition services referral as needed  2/18/2023 1655 by Leitcia Rodriguez RN  Outcome: Progressing  2/18/2023 1652 by Leticia Rodriguez RN  Outcome: Progressing  2/18/2023 1651 by Leticia Rodriguez RN  Outcome: Progressing     Problem: Prexisting or High Potential for Compromised Skin Integrity  Goal: Skin integrity is maintained or improved  Description: INTERVENTIONS:  - Identify patients at risk for skin breakdown  - Assess and monitor skin integrity  - Assess and monitor nutrition and hydration status  - Monitor labs   - Assess for incontinence   - Turn and reposition patient  - Assist with mobility/ambulation  - Relieve pressure over bony prominences  - Avoid friction and shearing  - Provide appropriate hygiene as needed including keeping skin clean and dry  - Evaluate need for skin moisturizer/barrier cream  - Collaborate with interdisciplinary team   - Patient/family teaching  - Consider wound care consult   2/18/2023 1655 by Aiyana Moore RN  Outcome: Progressing  2/18/2023 1652 by Aiyana Moore RN  Outcome: Progressing  2/18/2023 1651 by Aiyana Moore RN  Outcome: Progressing     Problem: MOBILITY - ADULT  Goal: Maintain or return to baseline ADL function  Description: INTERVENTIONS:  -  Assess patient's ability to carry out ADLs; assess patient's baseline for ADL function and identify physical deficits which impact ability to perform ADLs (bathing, care of mouth/teeth, toileting, grooming, dressing, etc )  - Assess/evaluate cause of self-care deficits   - Assess range of motion  - Assess patient's mobility; develop plan if impaired  - Assess patient's need for assistive devices and provide as appropriate  - Encourage maximum independence but intervene and supervise when necessary  - Involve family in performance of ADLs  - Assess for home care needs following discharge   - Consider OT consult to assist with ADL evaluation and planning for discharge  - Provide patient education as appropriate  2/18/2023 1655 by Aiyana Moore RN  Outcome: Progressing  2/18/2023 1652 by Aiyana Moore RN  Outcome: Progressing  2/18/2023 1651 by Aiyana Moore RN  Outcome: Progressing  Goal: Maintains/Returns to pre admission functional level  Description: INTERVENTIONS:  - Perform BMAT or MOVE assessment daily    - Set and communicate daily mobility goal to care team and patient/family/caregiver  - Collaborate with rehabilitation services on mobility goals if consulted  - Perform Range of Motion  times a day  - Reposition patient every  hours    - Dangle patient  times a day  - Stand patient  times a day  - Ambulate patient  times a day  - Out of bed to chair times a day   - Out of bed for meals  times a day  - Out of bed for toileting  - Record patient progress and toleration of activity level   2/18/2023 1655 by Leticia Rodriguez RN  Outcome: Progressing  2/18/2023 1652 by Leticia Rodriguez RN  Outcome: Progressing  2/18/2023 1651 by Leticia Rodriguez RN  Outcome: Progressing     Problem: Nutrition/Hydration-ADULT  Goal: Nutrient/Hydration intake appropriate for improving, restoring or maintaining nutritional needs  Description: Monitor and assess patient's nutrition/hydration status for malnutrition  Collaborate with interdisciplinary team and initiate plan and interventions as ordered  Monitor patient's weight and dietary intake as ordered or per policy  Utilize nutrition screening tool and intervene as necessary  Determine patient's food preferences and provide high-protein, high-caloric foods as appropriate       INTERVENTIONS:  - Monitor oral intake, urinary output, labs, and treatment plans  - Assess nutrition and hydration status and recommend course of action  - Evaluate amount of meals eaten  - Assist patient with eating if necessary   - Allow adequate time for meals  - Recommend/ encourage appropriate diets, oral nutritional supplements, and vitamin/mineral supplements  - Order, calculate, and assess calorie counts as needed  - Recommend, monitor, and adjust tube feedings and TPN/PPN based on assessed needs  - Assess need for intravenous fluids  - Provide specific nutrition/hydration education as appropriate  - Include patient/family/caregiver in decisions related to nutrition  2/18/2023 1655 by Leticia Rodriguez RN  Outcome: Progressing  2/18/2023 1652 by Leticia Rodriguez RN  Outcome: Progressing  2/18/2023 1651 by Leticia Rodriguez RN  Outcome: Progressing

## 2023-02-18 NOTE — CASE MANAGEMENT
Case Management Discharge Planning Note    Patient name Tayla Camacho /-93 MRN [id number]  : 1965 Date 2023       Current Admission Date: 2023  Current Admission Diagnosis:Sepsis [facility])   Patient Active Problem List    Diagnosis Date Noted   • Nephrolithiasis 2023   • Abnormal CT scan 2023   • Hyperkalemia 2023   • Hyponatremia 2023   • Dehiscence of incision 2022   • Elevated alkaline phosphatase level 2022   • Leukocytosis 2022   • Abscess 2022   • Acute pain 2022   • Sepsis ([address] ) 2022   • Severe protein-calorie malnutrition ([address] ) 2022   • Acute on chronic kidney failure ([address] ) 2022   • MR (mitral regurgitation) 2022   • ESBL (extended spectrum beta-lactamase) producing bacteria infection 2022   • Encephalopathy 2022   • Cervical radiculopathy 10/26/2022   • Other fatigue 06/15/2022   • Colostomy prolapse ([address] ) 2022   • Colon cancer metastasized to liver ([facility] ) 2022   • 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 liver ([facility] ) 2022   • Iron deficiency anemia, unspecified 2022   • Malignant neoplasm of transverse colon ([facility] ) 2022   • Thrombocytosis 2022   • Hypokalemia 2022   • Transaminitis 2022   • Type 2 diabetes mellitus with hyperlipidemia ([address] ) 2022   • Anemia 2022   • Left ureteral calculus 2020   • Incarcerated umbilical hernia    • Testicular hypogonadism 2017   • Low testosterone 2017   • Type 2 diabetes mellitus without complication, with long-term current use of insulin ([facility] [address] [facility] ) 2016   • Benign essential hypertension 2016   • Mixed hyperlipidemia 2016   • Erectile dysfunction 2016   • Obesity (BMI 30-39 9) 2016      LOS (days): 11  Geometric Mean LOS (GMLOS) (days):   Days to GMLOS:     OBJECTIVE:  Risk of Unplanned Readmission Score: 44 42         Current admission status: Inpatient   Preferred Pharmacy:   [facility]/pharmacy #[id number]- [facility] ANJELICA COBOS - [address] [facility] [place] [address]  Phone: [phone] Fax: 209 [address], [address] - [address] [facility] [address] [address]  Phone: [phone] Fax: [address], [address] - [address] [facility]  [address] [facility]  [address] [facility]  [address] [facility] [address]  Phone: [phone] Fax: [phone]    Primary Care Provider: Homer Sierra MD    Primary Insurance: [employer]  Secondary Insurance:     DISCHARGE DETAILS:    Other Referral/Resources/Interventions Provided:  Interventions: Acute Rehab  Referral Comments: CM sent a TT to the  DC Support team requesting an update on auth  No response from insurance company yet, Jenae Hines is still pending at this time  CM sent a TT to the weekend liaison from [address] [place] [address] who reported that [facility] has no beds this weekend  CM to follow up on Monday

## 2023-02-18 NOTE — PROGRESS NOTES
[address] [facility]  Progress Note - Jessica Ferreira 1965, 61 y o  male MRN: [id number]  Unit/Bed#: -Shawn Encounter: [id number]  Primary Care Provider: Khanh Estrada MD   Date and time admitted to hospital: 2/7/2023  3:01 PM    * Sepsis [facility])  Assessment & Plan  Noted the development of foul-smelling drainage and increasing pain from his chronic abdominal wound  · As evidenced by tachycardia, tachypnea, and leukocytosis  · In setting of purulent abdominal infection   · Hx of colon CA w/ extensive abd surgical hx as below   · Patient has several abdominal drains in place, with CT showing improvement in the fluid collections at the left liver lobe, perisplenic area, and the left retroperitoneum inferior to the spleen  · Completed IV Ertapenem and IV Vancomycin  · ID consulted; recommendations appreciated   · S/p bedside midline wound debridement + wet dressing 2/8 with general surgery  · S/p IR tube check 2/17/2023, drains kept in place  Discussed with IR, follow up in 1 month  · Awaiting placement    Nephrolithiasis  Assessment & Plan  RUQ US revealed 7 mm nonobstructing right intrarenal calculus  No hydronephrosis noted on CT a/p  Asymptomatic at this time     · Flomax  · Intake and output    Abnormal CT scan  Assessment & Plan  CT shows interval decrease in bilateral pleural effusions, however multiple small bilateral lung nodules are seen which are concerning for possible metastatic lung disease  · Patient reports having prior known lung nodules  · Oxygenating appropriately on room air; continue to monitor oxygenation  · Outpatient f/u    Acute on chronic kidney failure [facility])  Assessment & Plan  Lab Results   Component Value Date    EGFR 61 02/17/2023    EGFR 57 02/16/2023    EGFR 65 02/15/2023    CREATININE 1 27 02/17/2023    CREATININE 1 34 (H) 02/16/2023    CREATININE 1 21 02/15/2023     · POA with creatinine 2 52; baseline 1 0, improved  · Suspecting possible multifactorial cause in setting of dehydration and sepsis  Also likely Vancomycin contributing  · Avoid nephrotoxic agents  · Follow-up BMP this morning    Colon cancer metastasized to liver [facility])  Assessment & Plan  Follows w/ outpatient [facility] onc and palliative care  Extensive abdominal surgical history (11/7 ex lap w/ section 3 liver resection, and development of left upper quadrant hematoma and suspected leak from transverse colon anastomosis complication; 82/14 right hemicolectomy; 11/17 re-exploration w/partial descending colectomy; 12/8 IR percutaneous cholecystostomy tube and hepatectomy abscess drainage; 12/20 IR drains placed for perisplenic abscess)  · Patient does not wish to follow with [facility] oncology anymore as it is too far of a drive; would like to establish with [facility] oncology (already follows with Dr Maggie Santana)  · Continue outpatient f/u    Anemia  Assessment & Plan  Recent Labs     02/16/23  0530 02/16/23  2148 02/17/23  0601   HGB 6 8* 8 1* 8 1*     · 2/16 received BT 1u PRBC  · No overt bleeding noted  · Monitor CBC, transfuse as needed  · Awaiting CBC this morning    Benign essential hypertension  Assessment & Plan  BP stable  · Continue prehospital HTN medications  · Monitor BP per unit protocol    Type 2 diabetes mellitus without complication, with long-term current use of insulin ([facility] [address] )  Assessment & Plan    Lab Results   Component Value Date    HGBA1C 8 0 (H) 09/26/2022     · Home Lantus 8U qHS and humalog 4U TID w/meals   · Dose reduce to 5U qhs + 4 U TID   · 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  · Hypoglycemia protocol  · Diabetic diet         VTE Pharmacologic Prophylaxis: VTE Score: 6 High Risk (Score >/= 5) - Pharmacological DVT Prophylaxis Ordered: heparin  Sequential Compression Devices Ordered  Patient Centered Rounds: I performed bedside rounds with nursing staff today    Discussions with Specialists or Other Care Team Provider: case management    Education and Discussions with Family / Patient: Patient declined call to   Total Time Spent on Date of Encounter in care of patient: 45 minutes This time was spent on one or more of the following: performing physical exam; counseling and coordination of care; obtaining or reviewing history; documenting in the medical record; reviewing/ordering tests, medications or procedures; communicating with other healthcare professionals and discussing with patient's family/caregivers  Current Length of Stay: 11 day(s)  Current Patient Status: Inpatient   Certification Statement: The patient will continue to require additional inpatient hospital stay due to Pending placement  Discharge Plan: Once with placement    Code Status: Level 1 - Full Code    Subjective:   Reports feeling good and that this is the best that he has felt since  Looking forward to going to rehab and get stronger  Objective:     Vitals:   Temp (24hrs), Av 1 °F (36 7 °C), Min:97 5 °F (36 4 °C), Max:98 9 °F (37 2 °C)    Temp:  [97 5 °F (36 4 °C)-98 9 °F (37 2 °C)] 98 9 °F (37 2 °C)  HR:  [] 96  Resp:  [17-18] 18  BP: (122-136)/(77-84) 135/82  SpO2:  [96 %-99 %] 97 %  Body mass index is 27 98 kg/m²  Input and Output Summary (last 24 hours): Intake/Output Summary (Last 24 hours) at 2023 9888  Last data filed at 2023 0501  Gross per 24 hour   Intake 10 ml   Output 2165 ml   Net -2155 ml       Physical Exam:   Physical Exam  Vitals and nursing note reviewed  Constitutional:       General: He is not in acute distress  Appearance: He is well-developed  He is ill-appearing (chronically)  He is not toxic-appearing  HENT:      Head: Normocephalic and atraumatic  Nose: Nose normal       Mouth/Throat:      Mouth: Mucous membranes are moist       Pharynx: Oropharynx is clear  Eyes:      Conjunctiva/sclera: Conjunctivae normal       Pupils: Pupils are equal, round, and reactive to light  Neck:      Vascular: No JVD     Cardiovascular:      Rate and Rhythm: Normal rate and regular rhythm  Pulses: Normal pulses  Pulmonary:      Effort: Pulmonary effort is normal  No respiratory distress  Breath sounds: Normal breath sounds  Abdominal:      General: Bowel sounds are normal       Palpations: Abdomen is soft  Tenderness: There is no abdominal tenderness  There is no guarding  Comments: (+) drains x2, (+) colostomy   Musculoskeletal:         General: No swelling, tenderness or deformity  Normal range of motion  Cervical back: Normal range of motion and neck supple  No rigidity  Lymphadenopathy:      Cervical: No cervical adenopathy  Skin:     General: Skin is warm and dry  Findings: No erythema  Neurological:      General: No focal deficit present  Mental Status: He is alert and oriented to person, place, and time  Mental status is at baseline  Motor: No abnormal muscle tone  Psychiatric:         Mood and Affect: Mood normal          Thought Content: Thought content normal          Additional Data:     Labs:  Results from last 7 days   Lab Units 02/17/23  0601 02/16/23  2148 02/16/23  0530   WBC Thousand/uL 10 08  --  10 42*   HEMOGLOBIN g/dL 8 1*   < > 6 8*   HEMATOCRIT % 25 7*   < > 23 2*   PLATELETS Thousands/uL 345  --  345   NEUTROS PCT %  --   --  67   LYMPHS PCT %  --   --  18   MONOS PCT %  --   --  12   EOS PCT %  --   --  0    < > = values in this interval not displayed       Results from last 7 days   Lab Units 02/17/23  0601 02/16/23  0530 02/15/23  1412   SODIUM mmol/L 137   < > 131*   POTASSIUM mmol/L 3 8   < > 4 1   CHLORIDE mmol/L 107   < > 103   CO2 mmol/L 21   < > 21   BUN mg/dL 15   < > 22   CREATININE mg/dL 1 27   < > 1 21   ANION GAP mmol/L 9   < > 7   CALCIUM mg/dL 7 5*   < > 7 9*   ALBUMIN g/dL  --   --  2 0*   TOTAL BILIRUBIN mg/dL  --   --  0 60   ALK PHOS U/L  --   --  723*   ALT U/L  --   --  9   AST U/L  --   --  31   GLUCOSE RANDOM mg/dL 112   < > 172*    < > = values in this interval not displayed  Results from last 7 days   Lab Units 02/18/23  0809 02/17/23  2047 02/17/23  1621 02/17/23  1113 02/17/23  0918 02/16/23  2039 02/16/23  1812 02/16/23  1122 02/16/23  0739 02/15/23  2116 02/15/23  1759 02/15/23  1114   POC GLUCOSE mg/dl 151* 138 227* 222* 167* 169* 157* 230* 125 155* 131 220*         Results from last 7 days   Lab Units 02/16/23  0530 02/15/23  1412 02/13/23  0505   PROCALCITONIN ng/ml 0 51* 0 46* 0 75*       Lines/Drains:  Invasive Devices     Central Venous Catheter Line  Duration           Port A Cath 03/10/22 Right Chest 344 days          Drain  Duration           Ileostomy LUQ 92 days    Cholecystostomy Tube 67 days    Abscess Drain Abdomen 36 days                Central Line:  Goal for removal: N/A - Chronic PICC             Imaging: No pertinent imaging reviewed      Recent Cultures (last 7 days):         Last 24 Hours Medication List:   Current Facility-Administered Medications   Medication Dose Route Frequency Provider Last Rate   • acetaminophen  650 mg Oral Q4H PRN Iman Deshpande PA-C     • amLODIPine  2 5 [facility]     • aspirin  81 mg Oral Daily [place], [place]     • atorvastatin  40 mg Oral Daily [place]     • collagenase   Topical Daily Kell Paddy Lopez PA-C     • DULoxetine  30 mg Oral Daily [place], [place]     • heparin (porcine)  5,000 [facility]cutaneous [facility]     • HYDROmorphone  0 2 mg Intravenous Q4H PRN Nelia Ambrosio DO     • influenza vaccine  0 5 mL Intramuscular Prior to discharge Norma Zarate MD     • insulin glargine  5 Units Subcutaneous HS Kiara Wang PA-C     • insulin lispro  1-6 Units Subcutaneous TID AC Iman Deshpande PA-C     • insulin lispro  1-6 Units Subcutaneous HS Iman Deshpande PA-C     • insulin lispro  4 Units Subcutaneous TID With Meals Iman Deshpande PA-C     • melatonin  6 mg Oral HS PRN [place] PA-C     • methocarbamol  500 mg Oral BID PRN Vivienne Correa PA-C     • ondansetron  4 mg Intravenous Q4H PRN Nelia Kenzie Saraiya, DO     • oxyCODONE  5 mg Oral Q4H PRN Nelia Kenzie Saraiya, DO     • oxyCODONE  2 5 mg Oral Q4H PRN Nelia Kenzie Saraiya, DO     • pantoprazole  40 mg Oral BID Vivienne Correa PA-C     • pneumococcal 13-valent conjugate vaccine  0 5 mL Intramuscular Prior to discharge Louie Lucas MD     • simethicone  80 mg Oral 4x Daily (with meals and at bedtime) Vivienne Correa PA-C     • sodium chloride (PF)  10 mL Intravenous Daily Nelia Kenzie Saraiya, DO     • sodium chloride  75 mL/hr Intravenous Continuous Vincenzo Howard MD 75 mL/hr (02/17/23 2026)   • tamsulosin  0 4 mg Oral Daily With [address] [facility]          Today, Patient Was Seen By: Vincenzo Howard MD    **Please Note: This note may have been constructed using a voice recognition system  **

## 2023-02-18 NOTE — PLAN OF CARE
Problem: PAIN - ADULT  Goal: Verbalizes/displays adequate comfort level or baseline comfort level  Description: Interventions:  - Encourage patient to monitor pain and request assistance  - Assess pain using appropriate pain scale  - Administer analgesics based on type and severity of pain and evaluate response  - Implement non-pharmacological measures as appropriate and evaluate response  - Consider cultural and social influences on pain and pain management  - Notify physician/advanced practitioner if interventions unsuccessful or patient reports new pain  Outcome: Progressing     Problem: INFECTION - ADULT  Goal: Absence or prevention of progression during hospitalization  Description: INTERVENTIONS:  - Assess and monitor for signs and symptoms of infection  - Monitor lab/diagnostic results  - Monitor all insertion sites, i e  indwelling lines, tubes, and drains  - Monitor endotracheal if appropriate and nasal secretions for changes in amount and color  - [place] appropriate cooling/warming therapies per order  - Administer medications as ordered  - Instruct and encourage patient and family to use good hand hygiene technique  - Identify and instruct in appropriate isolation precautions for identified infection/condition  Outcome: Progressing  Goal: Absence of fever/infection during neutropenic period  Description: INTERVENTIONS:  - Monitor WBC    Outcome: Progressing     Problem: SAFETY ADULT  Goal: Patient will remain free of falls  Description: INTERVENTIONS:  - Educate patient/family on patient safety including physical limitations  - Instruct patient to call for assistance with activity   - Consult OT/PT to assist with strengthening/mobility   - Keep Call bell within reach  - Keep bed low and locked with side rails adjusted as appropriate  - Keep care items and personal belongings within reach  - Initiate and maintain comfort rounds  - Make Fall Risk Sign visible to staff  - Offer Toileting every 2 Hours, in advance of need  - Initiate/Maintain bed alarm  - Obtain necessary fall risk management equipment: call bell within reach  - Apply yellow socks and bracelet for high fall risk patients  - Consider moving patient to room near nurses station  Outcome: Progressing  Goal: Maintain or return to baseline ADL function  Description: INTERVENTIONS:  -  Assess patient's ability to carry out ADLs; assess patient's baseline for ADL function and identify physical deficits which impact ability to perform ADLs (bathing, care of mouth/teeth, toileting, grooming, dressing, etc )  - Assess/evaluate cause of self-care deficits   - Assess range of motion  - Assess patient's mobility; develop plan if impaired  - Assess patient's need for assistive devices and provide as appropriate  - Encourage maximum independence but intervene and supervise when necessary  - Involve family in performance of ADLs  - Assess for home care needs following discharge   - Consider OT consult to assist with ADL evaluation and planning for discharge  - Provide patient education as appropriate  Outcome: Progressing  Goal: Maintains/Returns to pre admission functional level  Description: INTERVENTIONS:  - Perform BMAT or MOVE assessment daily    - Set and communicate daily mobility goal to care team and patient/family/caregiver  - Collaborate with rehabilitation services on mobility goals if consulted  - Perform Range of Motion 3 times a day  - Reposition patient every 2 hours    - Dangle patient 3 times a day  - Stand patient 3 times a day  - Ambulate patient 3 times a day  - Out of bed to chair 3 times a day   - Out of bed for meals 3 times a day  - Out of bed for toileting  - Record patient progress and toleration of activity level   Outcome: Progressing     Problem: DISCHARGE PLANNING  Goal: Discharge to home or other facility with appropriate resources  Description: INTERVENTIONS:  - Identify barriers to discharge w/patient and caregiver  - Arrange for needed discharge resources and transportation as appropriate  - Identify discharge learning needs (meds, wound care, etc )  - Arrange for interpretive services to assist at discharge as needed  - Refer to Case Management Department for coordinating discharge planning if the patient needs post-hospital services based on physician/advanced practitioner order or complex needs related to functional status, cognitive ability, or social support system  Outcome: Progressing     Problem: Knowledge Deficit  Goal: Patient/family/caregiver demonstrates understanding of disease process, treatment plan, medications, and discharge instructions  Description: Complete learning assessment and assess knowledge base    Interventions:  - Provide teaching at level of understanding  - Provide teaching via preferred learning methods  Outcome: Progressing     Problem: SKIN/TISSUE INTEGRITY - ADULT  Goal: Skin Integrity remains intact(Skin Breakdown Prevention)  Description: Assess:  -Perform Michael assessment every shift  -Clean and moisturize skin every shift  -Inspect skin when repositioning, toileting, and assisting with ADLS    Bed Management:  -Have minimal linens on bed & keep smooth, unwrinkled  -Change linens as needed when moist or perspiring  -Avoid sitting or lying in one position for more than 2 hours while in bed  -Keep HOB at 30 degrees     Toileting:  -Offer bedside commode  -Assess for incontinence every shift    Activity:  -Mobilize patient 2 times a day  -Encourage activity and walks on unit  -Encourage or provide ROM exercises   -Turn and reposition patient every 2 Hours  -Use appropriate equipment to lift or move patient in bed  Skin Care:  -Avoid use of baby powder, tape, friction and shearing, hot water or constrictive clothing  -Relieve pressure over bony prominences using pillows  -Do not massage red bony areas    Outcome: Progressing  Goal: Incision(s), wounds(s) or drain site(s) healing without S/S of infection  Description: INTERVENTIONS  - Assess and document dressing, incision, wound bed, drain sites and surrounding tissue  - Provide patient and family education  - Perform skin care/dressing changes every shift  Outcome: Progressing  Goal: Pressure injury heals and does not worsen  Description: Interventions:  - Implement low air loss mattress or specialty surface (Criteria met)  - Apply silicone foam dressing  - Instruct/assist with weight shifting every 120 minutes when in chair   - Limit chair time to 2 hour intervals   Problem: Prexisting or High Potential for Compromised Skin Integrity  Goal: Skin integrity is maintained or improved  Description: INTERVENTIONS:  - Identify patients at risk for skin breakdown  - Assess and monitor skin integrity  - Assess and monitor nutrition and hydration status  - Monitor labs   - Assess for incontinence   - Turn and reposition patient  - Assist with mobility/ambulation  - Relieve pressure over bony prominences  - Avoid friction and shearing  - Provide appropriate hygiene as needed including keeping skin clean and dry  - Evaluate need for skin moisturizer/barrier cream  - Collaborate with interdisciplinary team   - Patient/family teaching  - Consider wound care consult   Outcome: Progressing     Problem: MOBILITY - ADULT  Goal: Maintain or return to baseline ADL function  Description: INTERVENTIONS:  -  Assess patient's ability to carry out ADLs; assess patient's baseline for ADL function and identify physical deficits which impact ability to perform ADLs (bathing, care of mouth/teeth, toileting, grooming, dressing, etc )  - Assess/evaluate cause of self-care deficits   - Assess range of motion  - Assess patient's mobility; develop plan if impaired  - Assess patient's need for assistive devices and provide as appropriate  - Encourage maximum independence but intervene and supervise when necessary  - Involve family in performance of ADLs  - Assess for home care needs following discharge   - Consider OT consult to assist with ADL evaluation and planning for discharge  - Provide patient education as appropriate  Outcome: Progressing  Goal: Maintains/Returns to pre admission functional level  Description: INTERVENTIONS:  - Perform BMAT or MOVE assessment daily    - Set and communicate daily mobility goal to care team and patient/family/caregiver  - Collaborate with rehabilitation services on mobility goals if consulted  - Perform Range of Motion 3 times a day  - Reposition patient every 2 hours  - Dangle patient 3 times a day  - Stand patient 3 times a day  - Ambulate patient 3 times a day  - Out of bed to chair 3 times a day   - Out of bed for meals 3 times a day  - Out of bed for toileting  - Record patient progress and toleration of activity level   Outcome: Progressing     Problem: Nutrition/Hydration-ADULT  Goal: Nutrient/Hydration intake appropriate for improving, restoring or maintaining nutritional needs  Description: Monitor and assess patient's nutrition/hydration status for malnutrition  Collaborate with interdisciplinary team and initiate plan and interventions as ordered  Monitor patient's weight and dietary intake as ordered or per policy  Utilize nutrition screening tool and intervene as necessary  Determine patient's food preferences and provide high-protein, high-caloric foods as appropriate       INTERVENTIONS:  - Monitor oral intake, urinary output, labs, and treatment plans  - Assess nutrition and hydration status and recommend course of action  - Evaluate amount of meals eaten  - Assist patient with eating if necessary   - Allow adequate time for meals  - Recommend/ encourage appropriate diets, oral nutritional supplements, and vitamin/mineral supplements  - Order, calculate, and assess calorie counts as needed  - Recommend, monitor, and adjust tube feedings and TPN/PPN based on assessed needs  - Assess need for intravenous fluids  - Provide specific nutrition/hydration education as appropriate  - Include patient/family/caregiver in decisions related to nutrition  Outcome: Progressing

## 2023-02-18 NOTE — ASSESSMENT & PLAN NOTE
Follows w/ outpatient [facility] onc and palliative care   Extensive abdominal surgical history (11/7 ex lap w/ section 3 liver resection, and development of left upper quadrant hematoma and suspected leak from transverse colon anastomosis complication; 83/32 right hemicolectomy; 11/17 re-exploration w/partial descending colectomy; 12/8 IR percutaneous cholecystostomy tube and hepatectomy abscess drainage; 12/20 IR drains placed for perisplenic abscess)  · Patient does not wish to follow with [place] oncology anymore as it is too far of a drive; would like to establish with [place] oncology (already follows with Dr Sanjiv Cedeno)  · Continue outpatient f/u

## 2023-02-19 PROBLEM — E87.1 HYPONATREMIA: Status: RESOLVED | Noted: 2023-02-07 | Resolved: 2023-02-19

## 2023-02-19 PROBLEM — E87.5 HYPERKALEMIA: Status: RESOLVED | Noted: 2023-02-07 | Resolved: 2023-02-19

## 2023-02-19 LAB
ANION GAP SERPL CALCULATED.3IONS-SCNC: 11 MMOL/L (ref 4–13)
BUN SERPL-MCNC: 12 MG/DL (ref 5–25)
CALCIUM SERPL-MCNC: 7.7 MG/DL (ref 8.3–10.1)
CHLORIDE SERPL-SCNC: 105 MMOL/L (ref 96–108)
CO2 SERPL-SCNC: 19 MMOL/L (ref 21–32)
CREAT SERPL-MCNC: 1.35 MG/DL (ref 0.6–1.3)
ERYTHROCYTE [DISTWIDTH] IN BLOOD BY AUTOMATED COUNT: 16.8 % (ref 11.6–15.1)
GFR SERPL CREATININE-BSD FRML MDRD: 57 ML/MIN/1.73SQ M
GLUCOSE SERPL-MCNC: 101 MG/DL (ref 65–140)
GLUCOSE SERPL-MCNC: 119 MG/DL (ref 65–140)
GLUCOSE SERPL-MCNC: 132 MG/DL (ref 65–140)
GLUCOSE SERPL-MCNC: 140 MG/DL (ref 65–140)
GLUCOSE SERPL-MCNC: 169 MG/DL (ref 65–140)
HCT VFR BLD AUTO: 27 % (ref 36.5–49.3)
HGB BLD-MCNC: 8.3 G/DL (ref 12–17)
MCH RBC QN AUTO: 26 PG (ref 26.8–34.3)
MCHC RBC AUTO-ENTMCNC: 30.7 G/DL (ref 31.4–37.4)
MCV RBC AUTO: 85 FL (ref 82–98)
PLATELET # BLD AUTO: 335 THOUSANDS/UL (ref 149–390)
PMV BLD AUTO: 9.2 FL (ref 8.9–12.7)
POTASSIUM SERPL-SCNC: 3.7 MMOL/L (ref 3.5–5.3)
RBC # BLD AUTO: 3.19 MILLION/UL (ref 3.88–5.62)
SODIUM SERPL-SCNC: 135 MMOL/L (ref 135–147)
WBC # BLD AUTO: 12.03 THOUSAND/UL (ref 4.31–10.16)

## 2023-02-19 RX ADMIN — ASPIRIN 81 MG 81 MG: 81 TABLET ORAL at 09:08

## 2023-02-19 RX ADMIN — SIMETHICONE 80 MG: 80 TABLET, CHEWABLE ORAL at 17:39

## 2023-02-19 RX ADMIN — INSULIN LISPRO 4 UNITS: 100 INJECTION, SOLUTION INTRAVENOUS; SUBCUTANEOUS at 12:43

## 2023-02-19 RX ADMIN — INSULIN GLARGINE 5 UNITS: 100 INJECTION, SOLUTION SUBCUTANEOUS at 22:04

## 2023-02-19 RX ADMIN — HYDROMORPHONE HYDROCHLORIDE 0.2 MG: 0.2 INJECTION, SOLUTION INTRAMUSCULAR; INTRAVENOUS; SUBCUTANEOUS at 22:57

## 2023-02-19 RX ADMIN — COLLAGENASE SANTYL: 250 OINTMENT TOPICAL at 09:08

## 2023-02-19 RX ADMIN — SIMETHICONE 80 MG: 80 TABLET, CHEWABLE ORAL at 12:40

## 2023-02-19 RX ADMIN — SIMETHICONE 80 MG: 80 TABLET, CHEWABLE ORAL at 22:04

## 2023-02-19 RX ADMIN — INSULIN LISPRO 1 UNITS: 100 INJECTION, SOLUTION INTRAVENOUS; SUBCUTANEOUS at 22:05

## 2023-02-19 RX ADMIN — INSULIN LISPRO 4 UNITS: 100 INJECTION, SOLUTION INTRAVENOUS; SUBCUTANEOUS at 17:42

## 2023-02-19 RX ADMIN — SODIUM CHLORIDE 75 ML/HR: 0.9 INJECTION, SOLUTION INTRAVENOUS at 22:59

## 2023-02-19 RX ADMIN — SODIUM CHLORIDE 75 ML/HR: 0.9 INJECTION, SOLUTION INTRAVENOUS at 11:05

## 2023-02-19 RX ADMIN — SODIUM CHLORIDE, PRESERVATIVE FREE 10 ML: 5 INJECTION INTRAVENOUS at 09:10

## 2023-02-19 RX ADMIN — OXYCODONE HYDROCHLORIDE 5 MG: 5 TABLET ORAL at 22:04

## 2023-02-19 RX ADMIN — ATORVASTATIN CALCIUM 40 MG: 40 TABLET, FILM COATED ORAL at 09:08

## 2023-02-19 RX ADMIN — PANTOPRAZOLE SODIUM 40 MG: 40 TABLET, DELAYED RELEASE ORAL at 17:39

## 2023-02-19 RX ADMIN — SIMETHICONE 80 MG: 80 TABLET, CHEWABLE ORAL at 09:19

## 2023-02-19 RX ADMIN — PANTOPRAZOLE SODIUM 40 MG: 40 TABLET, DELAYED RELEASE ORAL at 09:08

## 2023-02-19 RX ADMIN — TAMSULOSIN HYDROCHLORIDE 0.4 MG: 0.4 CAPSULE ORAL at 17:39

## 2023-02-19 RX ADMIN — ACETAMINOPHEN 650 MG: 325 TABLET, FILM COATED ORAL at 23:04

## 2023-02-19 RX ADMIN — INSULIN LISPRO 4 UNITS: 100 INJECTION, SOLUTION INTRAVENOUS; SUBCUTANEOUS at 09:10

## 2023-02-19 RX ADMIN — AMLODIPINE BESYLATE 2.5 MG: 2.5 TABLET ORAL at 09:08

## 2023-02-19 RX ADMIN — DULOXETINE HYDROCHLORIDE 30 MG: 30 CAPSULE, DELAYED RELEASE ORAL at 09:08

## 2023-02-19 NOTE — ASSESSMENT & PLAN NOTE
Follows w/ outpatient [facility] onc and palliative care   Extensive abdominal surgical history (11/7 ex lap w/ section 3 liver resection, and development of left upper quadrant hematoma and suspected leak from transverse colon anastomosis complication; 49/95 right hemicolectomy; 11/17 re-exploration w/partial descending colectomy; 12/8 IR percutaneous cholecystostomy tube and hepatectomy abscess drainage; 12/20 IR drains placed for perisplenic abscess)  · Patient does not wish to follow with [place] oncology anymore as it is too far of a drive; would like to establish with [facility] oncology (already follows with Dr Dasia Abebe)  · Continue outpatient f/u

## 2023-02-19 NOTE — PLAN OF CARE
Problem: PHYSICAL THERAPY ADULT  Goal: Performs mobility at highest level of function for planned discharge setting  See evaluation for individualized goals  Description: Treatment/Interventions: Functional transfer training, LE strengthening/ROM, Therapeutic exercise, Endurance training, Patient/family training, Bed mobility, Gait training, Spoke to nursing, OT, Family  Equipment Recommended: Shyla Hernandez       See flowsheet documentation for full assessment, interventions and recommendations  Outcome: Progressing  Note: Prognosis: Good  Problem List: Decreased strength, Decreased endurance, Impaired balance, Decreased mobility, Decreased safety awareness, Pain  Assessment: Pt seen for PT treatment session this date with interventions consisting of gait training w/ emphasis on improving pt's ability to ambulate level surfaces x 38' with min A provided by therapist with RW, Therapeutic exercise consisting of: AROM 20 reps B LE in sitting position and therapeutic activity consisting of training: sit<>stand transfers  Pt agreeable to PT treatment session upon arrival, pt found seated OOB in recliner, in no apparent distress  In comparison to previous session, pt with improvements in activity tolerance, mobility, gradually improving steadiness  Post session: pt returned back to recliner, all needs in reach and RN notified of session findings/recommendations  Continue to recommend post acute rehabilitation services at time of d/c in order to maximize pt's functional independence and safety w/ mobility  Pt continues to be functioning below baseline level, and remains limited 2* factors listed above and including decreased strength, decreased balance, decline in functional mobility requiring A x1, decreased endurance  PT will continue to see pt during current hospitalization in order to address the deficits listed above and provide interventions consistent w/ POC in effort to achieve STGs    Barriers to Discharge: Inaccessible home environment, Other (Comment) (decline in functional baseline)     PT Discharge Recommendation: Post acute rehabilitation services    See flowsheet documentation for full assessment

## 2023-02-19 NOTE — ASSESSMENT & PLAN NOTE
Lab Results   Component Value Date    EGFR 57 02/19/2023    EGFR 55 02/18/2023    EGFR 61 02/17/2023    CREATININE 1 35 (H) 02/19/2023    CREATININE 1 38 (H) 02/18/2023    CREATININE 1 27 02/17/2023     · POA with creatinine 2 52; baseline 1 0, improved  · Suspecting possible multifactorial cause in setting of dehydration and sepsis   Also likely Vancomycin contributing  · Avoid nephrotoxic agents  · Steadily downtrending

## 2023-02-19 NOTE — PHYSICAL THERAPY NOTE
PT Treatment Note     02/19/23 1302   PT Last Visit   PT Visit Date 02/19/23   Note Type   Note Type Treatment   Pain Assessment   Pain Assessment Tool 0-10   Pain Score No Pain   Pain Location/Orientation Location: Abdomen  ("soreness")   Restrictions/Precautions   Weight Bearing Precautions Per Order No   Braces or Orthoses   (none at baseline)   Other Precautions Contact/isolation; Bed Alarm; Fall Risk;Pain   General   Chart Reviewed Yes   Response to Previous Treatment Patient with no complaints from previous session  Family/Caregiver Present No   Cognition   Overall Cognitive Status WFL   Arousal/Participation Alert; Cooperative   Attention Within functional limits   Orientation Level Oriented X4   Memory Within functional limits   Following Commands Follows all commands and directions without difficulty   Subjective   Subjective "I am slowly getting stronger"   Bed Mobility   Additional Comments Pt  recieved at start of session seated in recliner all needs in reach  Transfers   Sit to Stand 4  Minimal assistance   Additional items Assist x 1; Increased time required;Verbal cues   Stand to Sit 4  Minimal assistance   Additional items Assist x 1; Increased time required;Verbal cues   Additional Comments Uses of RW during transfers  Pt  demonstrated appropriate positioning within RW and did not require cues for hand placement  Unsteady with turns, per pt  he is walking with "less shuffling"   Ambulation/Elevation   Gait pattern Decreased foot clearance; Improper Weight shift; Short stride;Decreased hip extension;Decreased heel strike   Gait Assistance 4  Minimal assist   Additional items Assist x 1;Verbal cues   Assistive Device Rolling walker   Distance 38'   Stair Management Assistance Not tested   Ambulation/Elevation Additional Comments Pt  did note fatigue with ambulation, denied dizziness this date   Balance   Static Sitting Fair +   Dynamic Sitting Fair   Static Standing [facility] Fair -   Endurance Deficit   Endurance Deficit Yes   Endurance Deficit Description decreased activity tolerance   Activity Tolerance   Activity Tolerance Patient limited by fatigue   Nurse Made Aware RN ok to see   Exercises   Hip Flexion Sitting;20 reps;Bilateral   Hip Abduction Sitting;20 reps;Bilateral  (resisted manual)   Hip Adduction Sitting;20 reps;Bilateral  (with pillow)   Knee AROM Long Arc Quad Sitting;20 reps;Bilateral   Ankle Pumps Sitting;20 reps;Bilateral  (AP and then heel raises seated)   Assessment   Prognosis Good   Problem List Decreased strength;Decreased endurance; Impaired balance;Decreased mobility; Decreased safety awareness;Pain   Assessment Pt seen for PT treatment session this date with interventions consisting of gait training w/ emphasis on improving pt's ability to ambulate level surfaces x 38' with min A provided by therapist with RW, Therapeutic exercise consisting of: AROM 20 reps B LE in sitting position and therapeutic activity consisting of training: sit<>stand transfers  Pt agreeable to PT treatment session upon arrival, pt found seated OOB in recliner, in no apparent distress  In comparison to previous session, pt with improvements in activity tolerance, mobility, gradually improving steadiness  Post session: pt returned back to recliner, all needs in reach and RN notified of session findings/recommendations  Continue to recommend post acute rehabilitation services at time of d/c in order to maximize pt's functional independence and safety w/ mobility  Pt continues to be functioning below baseline level, and remains limited 2* factors listed above and including decreased strength, decreased balance, decline in functional mobility requiring A x1, decreased endurance  PT will continue to see pt during current hospitalization in order to address the deficits listed above and provide interventions consistent w/ POC in effort to achieve STGs     Barriers to Discharge Inaccessible home environment; Other (Comment)  (decline in functional baseline)   Goals   Patient Goals to go to rehab to be independent   STG Expiration Date 02/27/23   Short Term Goal #1 In 10 days: Perform all bed mobility tasks modified independent to decrease caregiver burden, Perform all transfers modified independent to improve independence, Ambulate > 50 ft  with RW modified independent w/o LOB and w/ normalized gait pattern 100% of the time, Increase all balance 1/2 grade to decrease risk for falls and PT to see and establish goals for stairs when appropriate   PT Treatment Day 4   Plan   Treatment/Interventions Functional transfer training;LE strengthening/ROM; Therapeutic exercise; Endurance training;Bed mobility;Gait training;Equipment eval/education;Patient/family training;Spoke to nursing   Progress Progressing toward goals   PT Frequency 3-5x/wk   Recommendation   PT Discharge Recommendation Post acute rehabilitation services   Equipment Recommended Walker   AM-PAC Basic Mobility Inpatient   Turning in Flat Bed Without Bedrails 3   Lying on Back to Sitting on Edge of Flat Bed Without Bedrails 3   Moving Bed to Chair 3   Standing Up From Chair Using Arms 3   Walk in Room 3   Climb 3-5 Stairs With Railing 1   Basic Mobility Inpatient Raw Score 16   Basic Mobility Standardized Score 38 32   Highest Level Of Mobility   JH-HLM Goal 5: Stand one or more mins   JH-HLM Achieved 7: Walk 25 feet or more   Education   Education Provided Mobility training;Home exercise program;Assistive device   Patient Reinforcement needed   End of Consult   Patient Position at End of Consult Bed/Chair alarm activated; All needs within reach      [facility]

## 2023-02-19 NOTE — ASSESSMENT & PLAN NOTE
Recent Labs     02/17/23  0601 02/18/23  0858 02/19/23  0623   HGB 8 1* 7 8* 8 3*     · 2/16 received BT 1u PRBC  · No overt bleeding noted  · Monitor CBC, transfuse as needed  · Hemoglobin stable

## 2023-02-19 NOTE — PROGRESS NOTES
[address] [facility]  Progress Note - Nolvia Chaney 1965, 61 y o  male MRN: [id number]  Unit/Bed#: -01 Encounter: [id number]  Primary Care Provider: Tyler Mcdaniel MD   Date and time admitted to hospital: 2/7/2023  3:01 PM    * Sepsis [facility])  Assessment & Plan  Noted the development of foul-smelling drainage and increasing pain from his chronic abdominal wound  · As evidenced by tachycardia, tachypnea, and leukocytosis  · In setting of purulent abdominal infection   · Hx of colon CA w/ extensive abd surgical hx as below   · Patient has several abdominal drains in place, with CT showing improvement in the fluid collections at the left liver lobe, perisplenic area, and the left retroperitoneum inferior to the spleen  · Completed IV Ertapenem and IV Vancomycin  · ID consulted; recommendations appreciated   · S/p bedside midline wound debridement + wet dressing 2/8 with general surgery  · S/p IR tube check 2/17/2023, drains kept in place  Discussed with IR, follow up in 1 month  · Awaiting placement    Nephrolithiasis  Assessment & Plan  RUQ US revealed 7 mm nonobstructing right intrarenal calculus  No hydronephrosis noted on CT a/p  Asymptomatic at this time     · Flomax  · Intake and output    Abnormal CT scan  Assessment & Plan  CT shows interval decrease in bilateral pleural effusions, however multiple small bilateral lung nodules are seen which are concerning for possible metastatic lung disease  · Patient reports having prior known lung nodules  · Oxygenating appropriately on room air; continue to monitor oxygenation  · Outpatient f/u    Acute on chronic kidney failure [facility])  Assessment & Plan  Lab Results   Component Value Date    EGFR 57 02/19/2023    EGFR 55 02/18/2023    EGFR 61 02/17/2023    CREATININE 1 35 (H) 02/19/2023    CREATININE 1 38 (H) 02/18/2023    CREATININE 1 27 02/17/2023     · POA with creatinine 2 52; baseline 1 0, improved  · Suspecting possible multifactorial cause in setting of dehydration and sepsis  Also likely Vancomycin contributing  · Avoid nephrotoxic agents  · Steadily downtrending    Colon cancer metastasized to liver [facility])  Assessment & Plan  Follows w/ outpatient [facility] onc and palliative care  Extensive abdominal surgical history (11/7 ex lap w/ section 3 liver resection, and development of left upper quadrant hematoma and suspected leak from transverse colon anastomosis complication; 83/23 right hemicolectomy; 11/17 re-exploration w/partial descending colectomy; 12/8 IR percutaneous cholecystostomy tube and hepatectomy abscess drainage; 12/20 IR drains placed for perisplenic abscess)  · Patient does not wish to follow with [facility] oncology anymore as it is too far of a drive; would like to establish with [facility] oncology (already follows with Dr Les Jung)  · Continue outpatient f/u    Anemia  Assessment & Plan  Recent Labs     02/17/23  0601 02/18/23  0858 02/19/23  0623   HGB 8 1* 7 8* 8 3*     · 2/16 received BT 1u PRBC  · No overt bleeding noted  · Monitor CBC, transfuse as needed  · Hemoglobin stable    Benign essential hypertension  Assessment & Plan  BP stable  · Continue prehospital HTN medications  · Monitor BP per unit protocol    Type 2 diabetes mellitus without complication, with long-term current use of insulin (HCC)  Assessment & Plan    Lab Results   Component Value Date    HGBA1C 8 0 (H) 09/26/2022     · Home Lantus 8U qHS and humalog 4U TID w/meals   · Dose reduce to 5U qhs + 4 U TID   · 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  · Hypoglycemia protocol  · Diabetic diet         VTE Pharmacologic Prophylaxis: VTE Score: 6 High Risk (Score >/= 5) - Pharmacological DVT Prophylaxis Ordered: heparin  Sequential Compression Devices Ordered  Patient Centered Rounds: I performed bedside rounds with nursing staff today    Discussions with Specialists or Other Care Team Provider: case management    Education and Discussions with Family / Patient: Patient declined call to   Total Time Spent on Date of Encounter in care of patient: 45 minutes This time was spent on one or more of the following: performing physical exam; counseling and coordination of care; obtaining or reviewing history; documenting in the medical record; reviewing/ordering tests, medications or procedures; communicating with other healthcare professionals and discussing with patient's family/caregivers  Current Length of Stay: 12 day(s)  Current Patient Status: Inpatient   Certification Statement: The patient will continue to require additional inpatient hospital stay due to pending rehab  Discharge Plan: once with placement to rehab    Code Status: Level 1 - Full Code    Subjective:   Feels great this morning  Appetite has really improved  Will get up and out of bed this morning  Encouraged and eager to go to rehab  Objective:     Vitals:   Temp (24hrs), Av 5 °F (36 9 °C), Min:98 1 °F (36 7 °C), Max:98 9 °F (37 2 °C)    Temp:  [98 1 °F (36 7 °C)-98 9 °F (37 2 °C)] 98 1 °F (36 7 °C)  HR:  [70-96] 70  Resp:  [18] 18  BP: (130-148)/(72-88) 144/88  SpO2:  [96 %-98 %] 97 %  Body mass index is 27 98 kg/m²  Input and Output Summary (last 24 hours): Intake/Output Summary (Last 24 hours) at 2023 0735  Last data filed at 2023 0601  Gross per 24 hour   Intake 490 ml   Output 5120 ml   Net -4630 ml       Physical Exam:   Physical Exam  Vitals and nursing note reviewed  Constitutional:       General: He is not in acute distress  Appearance: He is well-developed  He is ill-appearing (chronically)  He is not toxic-appearing  HENT:      Head: Normocephalic and atraumatic  Nose: Nose normal       Mouth/Throat:      Mouth: Mucous membranes are moist       Pharynx: Oropharynx is clear  Eyes:      Conjunctiva/sclera: Conjunctivae normal       Pupils: Pupils are equal, round, and reactive to light  Neck:      Vascular: No JVD     Cardiovascular:      Rate and Rhythm: Normal rate and regular rhythm  Pulses: Normal pulses  Pulmonary:      Effort: Pulmonary effort is normal  No respiratory distress  Breath sounds: Normal breath sounds  Abdominal:      General: Bowel sounds are normal       Palpations: Abdomen is soft  Tenderness: There is no abdominal tenderness  There is no guarding  Comments: (+) drains x2, (+) colostomy   Musculoskeletal:         General: No swelling, tenderness or deformity  Normal range of motion  Cervical back: Normal range of motion and neck supple  No rigidity  Lymphadenopathy:      Cervical: No cervical adenopathy  Skin:     General: Skin is warm and dry  Findings: No erythema  Neurological:      General: No focal deficit present  Mental Status: He is alert and oriented to person, place, and time  Mental status is at baseline  Motor: No abnormal muscle tone  Psychiatric:         Mood and Affect: Mood normal          Thought Content: Thought content normal          Additional Data:     Labs:  Results from last 7 days   Lab Units 02/19/23  0623 02/16/23  2148 02/16/23  0530   WBC Thousand/uL 12 03*   < > 10 42*   HEMOGLOBIN g/dL 8 3*   < > 6 8*   HEMATOCRIT % 27 0*   < > 23 2*   PLATELETS Thousands/uL 335   < > 345   NEUTROS PCT %  --   --  67   LYMPHS PCT %  --   --  18   MONOS PCT %  --   --  12   EOS PCT %  --   --  0    < > = values in this interval not displayed       Results from last 7 days   Lab Units 02/19/23  0623 02/16/23  0530 02/15/23  1412   SODIUM mmol/L 135   < > 131*   POTASSIUM mmol/L 3 7   < > 4 1   CHLORIDE mmol/L 105   < > 103   CO2 mmol/L 19*   < > 21   BUN mg/dL 12   < > 22   CREATININE mg/dL 1 35*   < > 1 21   ANION GAP mmol/L 11   < > 7   CALCIUM mg/dL 7 7*   < > 7 9*   ALBUMIN g/dL  --   --  2 0*   TOTAL BILIRUBIN mg/dL  --   --  0 60   ALK PHOS U/L  --   --  723*   ALT U/L  --   --  9   AST U/L  --   --  31   GLUCOSE RANDOM mg/dL 101   < > 172*    < > = values in this interval not displayed  Results from last 7 days   Lab Units 02/19/23  0715 02/18/23  2052 02/18/23  1700 02/18/23  1246 02/18/23  0809 02/17/23  2047 02/17/23  1621 02/17/23  1113 02/17/23  0918 02/16/23  2039 02/16/23  1812 02/16/23  1122   POC GLUCOSE mg/dl 119 165* 347* 149* 151* 138 227* 222* 167* 169* 157* 230*         Results from last 7 days   Lab Units 02/16/23  0530 02/15/23  1412 02/13/23  0505   PROCALCITONIN ng/ml 0 51* 0 46* 0 75*       Lines/Drains:  Invasive Devices     Central Venous Catheter Line  Duration           Port A Cath 03/10/22 Right Chest 345 days          Drain  Duration           Ileostomy LUQ 93 days    Cholecystostomy Tube 68 days    Abscess Drain Abdomen 37 days                Central Line:  Goal for removal: N/A - Chronic PICC             Imaging: No pertinent imaging reviewed      Recent Cultures (last 7 days):         Last 24 Hours Medication List:   Current Facility-Administered Medications   Medication Dose Route Frequency Provider Last Rate   • acetaminophen  650 mg Oral Q4H PRN Nahum Pepe PA-C     • amLODIPine  2 5 mg Oral Daily Nahum Pepe PA-C     • aspirin  81 mg Oral Daily Nahum Pepe [place]     • atorvastatin  40 mg Oral Daily Nahum Pepe [place]     • collagenase   Topical Daily Irene Lopez PA-C     • DULoxetine  30 mg Oral Daily Nahum Pepe [place]     • heparin (porcine)  5,000 Units Subcutaneous [facility] Yuliya Navarro [place]     • HYDROmorphone  0 2 mg Intravenous Q4H PRN Nelia Ambrosio DO     • influenza vaccine  0 5 mL Intramuscular Prior to discharge Kasandra Wolf MD     • insulin glargine  5 Units Subcutaneous HS Kiara Wang PA-C     • insulin lispro  1-6 Units Subcutaneous TID AC Nahum Pepe PA-C     • insulin lispro  1-6 Units Subcutaneous HS Nahum Pepe PA-C     • insulin lispro  4 Units Subcutaneous TID With Meals Nahum Pepe PA-C     • melatonin  6 mg Oral HS PRN Shanta Brown PA-C     • methocarbamol  500 mg Oral BID PRN Christianne Ibarra PA-C     • ondansetron  4 mg Intravenous Q4H PRN Nelia Kenzie Saraiya, DO     • oxyCODONE  5 mg Oral Q4H PRN Nelia Kenzie Saraiya, DO     • oxyCODONE  2 5 mg Oral Q4H PRN Nelia Kenzie Saraiya, DO     • pantoprazole  40 mg Oral BID Christianne Ibarra PA-C     • pneumococcal 13-valent conjugate vaccine  0 5 mL Intramuscular Prior to discharge Shena Johnson MD     • simethicone  80 mg Oral 4x Daily (with meals and at bedtime) Christianne Ibarra PA-C     • sodium chloride (PF)  10 mL Intravenous Daily Nelia Kenzie Saraiya, DO     • sodium chloride  75 mL/hr Intravenous Continuous Lakshmi Rogers MD 75 mL/hr (02/18/23 2107)   • tamsulosin  0 4 mg Oral Daily With [address] [facility]          Today, Patient Was Seen By: Lakshmi Rogers MD    **Please Note: This note may have been constructed using a voice recognition system  **

## 2023-02-19 NOTE — PLAN OF CARE
Problem: PAIN - ADULT  Goal: Verbalizes/displays adequate comfort level or baseline comfort level  Description: Interventions:  - Encourage patient to monitor pain and request assistance  - Assess pain using appropriate pain scale  - Administer analgesics based on type and severity of pain and evaluate response  - Implement non-pharmacological measures as appropriate and evaluate response  - Consider cultural and social influences on pain and pain management  - Notify physician/advanced practitioner if interventions unsuccessful or patient reports new pain  Outcome: Progressing     Problem: INFECTION - ADULT  Goal: Absence or prevention of progression during hospitalization  Description: INTERVENTIONS:  - Assess and monitor for signs and symptoms of infection  - Monitor lab/diagnostic results  - Monitor all insertion sites, i e  indwelling lines, tubes, and drains  - Monitor endotracheal if appropriate and nasal secretions for changes in amount and color  - [place] appropriate cooling/warming therapies per order  - Administer medications as ordered  - Instruct and encourage patient and family to use good hand hygiene technique  - Identify and instruct in appropriate isolation precautions for identified infection/condition  Outcome: Progressing  Goal: Absence of fever/infection during neutropenic period  Description: INTERVENTIONS:  - Monitor WBC    Outcome: Progressing     Problem: SAFETY ADULT  Goal: Patient will remain free of falls  Description: INTERVENTIONS:  - Educate patient/family on patient safety including physical limitations  - Instruct patient to call for assistance with activity   - Consult OT/PT to assist with strengthening/mobility   - Keep Call bell within reach  - Keep bed low and locked with side rails adjusted as appropriate  - Keep care items and personal belongings within reach  - Initiate and maintain comfort rounds  - Make Fall Risk Sign visible to staff  - Offer Toileting every  Hours, in advance of need  - Initiate/Maintain alarm  - Obtain necessary fall risk management equipment:   - Apply yellow socks and bracelet for high fall risk patients  - Consider moving patient to room near nurses station  Outcome: Progressing  Goal: Maintain or return to baseline ADL function  Description: INTERVENTIONS:  -  Assess patient's ability to carry out ADLs; assess patient's baseline for ADL function and identify physical deficits which impact ability to perform ADLs (bathing, care of mouth/teeth, toileting, grooming, dressing, etc )  - Assess/evaluate cause of self-care deficits   - Assess range of motion  - Assess patient's mobility; develop plan if impaired  - Assess patient's need for assistive devices and provide as appropriate  - Encourage maximum independence but intervene and supervise when necessary  - Involve family in performance of ADLs  - Assess for home care needs following discharge   - Consider OT consult to assist with ADL evaluation and planning for discharge  - Provide patient education as appropriate  Outcome: Progressing  Goal: Maintains/Returns to pre admission functional level  Description: INTERVENTIONS:  - Perform BMAT or MOVE assessment daily    - Set and communicate daily mobility goal to care team and patient/family/caregiver  - Collaborate with rehabilitation services on mobility goals if consulted  - Perform Range of Motion  times a day  - Reposition patient every  hours    - Dangle patient  times a day  - Stand patient  times a day  - Ambulate patient  times a day  - Out of bed to chair times a day   - Out of bed for meals  times a day  - Out of bed for toileting  - Record patient progress and toleration of activity level   Outcome: Progressing

## 2023-02-20 LAB
ANION GAP SERPL CALCULATED.3IONS-SCNC: 10 MMOL/L (ref 4–13)
BUN SERPL-MCNC: 12 MG/DL (ref 5–25)
CALCIUM SERPL-MCNC: 7.5 MG/DL (ref 8.3–10.1)
CHLORIDE SERPL-SCNC: 106 MMOL/L (ref 96–108)
CO2 SERPL-SCNC: 20 MMOL/L (ref 21–32)
CREAT SERPL-MCNC: 1.38 MG/DL (ref 0.6–1.3)
ERYTHROCYTE [DISTWIDTH] IN BLOOD BY AUTOMATED COUNT: 16.8 % (ref 11.6–15.1)
GFR SERPL CREATININE-BSD FRML MDRD: 55 ML/MIN/1.73SQ M
GLUCOSE SERPL-MCNC: 110 MG/DL (ref 65–140)
GLUCOSE SERPL-MCNC: 127 MG/DL (ref 65–140)
GLUCOSE SERPL-MCNC: 163 MG/DL (ref 65–140)
GLUCOSE SERPL-MCNC: 167 MG/DL (ref 65–140)
GLUCOSE SERPL-MCNC: 200 MG/DL (ref 65–140)
HCT VFR BLD AUTO: 23.9 % (ref 36.5–49.3)
HCT VFR BLD AUTO: 27.5 % (ref 36.5–49.3)
HGB BLD-MCNC: 7.3 G/DL (ref 12–17)
HGB BLD-MCNC: 8.4 G/DL (ref 12–17)
MCH RBC QN AUTO: 25.9 PG (ref 26.8–34.3)
MCHC RBC AUTO-ENTMCNC: 30.5 G/DL (ref 31.4–37.4)
MCV RBC AUTO: 85 FL (ref 82–98)
PLATELET # BLD AUTO: 303 THOUSANDS/UL (ref 149–390)
PMV BLD AUTO: 9.5 FL (ref 8.9–12.7)
POTASSIUM SERPL-SCNC: 3.4 MMOL/L (ref 3.5–5.3)
RBC # BLD AUTO: 2.82 MILLION/UL (ref 3.88–5.62)
SODIUM SERPL-SCNC: 136 MMOL/L (ref 135–147)
WBC # BLD AUTO: 10.23 THOUSAND/UL (ref 4.31–10.16)

## 2023-02-20 RX ORDER — POTASSIUM CHLORIDE 20 MEQ/1
40 TABLET, EXTENDED RELEASE ORAL ONCE
Status: COMPLETED | OUTPATIENT
Start: 2023-02-20 | End: 2023-02-20

## 2023-02-20 RX ADMIN — PANTOPRAZOLE SODIUM 40 MG: 40 TABLET, DELAYED RELEASE ORAL at 17:49

## 2023-02-20 RX ADMIN — SIMETHICONE 80 MG: 80 TABLET, CHEWABLE ORAL at 22:42

## 2023-02-20 RX ADMIN — SIMETHICONE 80 MG: 80 TABLET, CHEWABLE ORAL at 17:49

## 2023-02-20 RX ADMIN — INSULIN LISPRO 1 UNITS: 100 INJECTION, SOLUTION INTRAVENOUS; SUBCUTANEOUS at 22:43

## 2023-02-20 RX ADMIN — PANTOPRAZOLE SODIUM 40 MG: 40 TABLET, DELAYED RELEASE ORAL at 10:02

## 2023-02-20 RX ADMIN — OXYCODONE HYDROCHLORIDE 5 MG: 5 TABLET ORAL at 22:41

## 2023-02-20 RX ADMIN — INSULIN LISPRO 1 UNITS: 100 INJECTION, SOLUTION INTRAVENOUS; SUBCUTANEOUS at 17:53

## 2023-02-20 RX ADMIN — COLLAGENASE SANTYL: 250 OINTMENT TOPICAL at 10:04

## 2023-02-20 RX ADMIN — SIMETHICONE 80 MG: 80 TABLET, CHEWABLE ORAL at 11:41

## 2023-02-20 RX ADMIN — AMLODIPINE BESYLATE 2.5 MG: 2.5 TABLET ORAL at 10:02

## 2023-02-20 RX ADMIN — SODIUM CHLORIDE, PRESERVATIVE FREE 10 ML: 5 INJECTION INTRAVENOUS at 10:04

## 2023-02-20 RX ADMIN — TAMSULOSIN HYDROCHLORIDE 0.4 MG: 0.4 CAPSULE ORAL at 17:49

## 2023-02-20 RX ADMIN — INSULIN LISPRO 2 UNITS: 100 INJECTION, SOLUTION INTRAVENOUS; SUBCUTANEOUS at 11:42

## 2023-02-20 RX ADMIN — DULOXETINE HYDROCHLORIDE 30 MG: 30 CAPSULE, DELAYED RELEASE ORAL at 10:02

## 2023-02-20 RX ADMIN — INSULIN LISPRO 4 UNITS: 100 INJECTION, SOLUTION INTRAVENOUS; SUBCUTANEOUS at 11:42

## 2023-02-20 RX ADMIN — ASPIRIN 81 MG 81 MG: 81 TABLET ORAL at 10:02

## 2023-02-20 RX ADMIN — POTASSIUM CHLORIDE 40 MEQ: 1500 TABLET, EXTENDED RELEASE ORAL at 10:02

## 2023-02-20 RX ADMIN — SIMETHICONE 80 MG: 80 TABLET, CHEWABLE ORAL at 10:14

## 2023-02-20 RX ADMIN — ATORVASTATIN CALCIUM 40 MG: 40 TABLET, FILM COATED ORAL at 10:02

## 2023-02-20 RX ADMIN — INSULIN LISPRO 4 UNITS: 100 INJECTION, SOLUTION INTRAVENOUS; SUBCUTANEOUS at 17:53

## 2023-02-20 RX ADMIN — INSULIN GLARGINE 5 UNITS: 100 INJECTION, SOLUTION SUBCUTANEOUS at 22:43

## 2023-02-20 NOTE — PROGRESS NOTES
[address]  Progress Note - Demetris Li 1965, 61 y o  male MRN: [id number]  Unit/Bed#: -Shawn Encounter: [id number]  Primary Care Provider: Amos Segundo MD   Date and time admitted to hospital: 2/7/2023  3:01 PM    * Sepsis [facility])  Assessment & Plan  Noted the development of foul-smelling drainage and increasing pain from his chronic abdominal wound  · As evidenced by tachycardia, tachypnea, and leukocytosis  · In setting of purulent abdominal infection   · Hx of colon CA w/ extensive abd surgical hx as below   · Patient has several abdominal drains in place, with CT showing improvement in the fluid collections at the left liver lobe, perisplenic area, and the left retroperitoneum inferior to the spleen  · Completed IV Ertapenem and IV Vancomycin  · ID consulted; recommendations appreciated   · S/p bedside midline wound debridement + wet dressing 2/8 with general surgery  · S/p IR tube check 2/17/2023, drains kept in place  Discussed with IR, follow up in 1 month  · Awaiting placement    Abnormal CT scan  Assessment & Plan  CT shows interval decrease in bilateral pleural effusions, however multiple small bilateral lung nodules are seen which are concerning for possible metastatic lung disease  · Patient reports having prior known lung nodules  · Oxygenating appropriately on room air; continue to monitor oxygenation  · Outpatient f/u    Acute on chronic kidney failure [facility])  Assessment & Plan  Lab Results   Component Value Date    EGFR 55 02/20/2023    EGFR 57 02/19/2023    EGFR 55 02/18/2023    CREATININE 1 38 (H) 02/20/2023    CREATININE 1 35 (H) 02/19/2023    CREATININE 1 38 (H) 02/18/2023     · POA with creatinine 2 52; baseline 1-1 3  · Suspecting possible multifactorial cause in setting of dehydration and sepsis   Also likely Vancomycin contributing  · Avoid nephrotoxic agents  · Steadily downtrending    Colon cancer metastasized to liver [facility])  Assessment & Plan  Follows w/ outpatient [facility] onc and palliative care  Extensive abdominal surgical history (11/7 ex lap w/ section 3 liver resection, and development of left upper quadrant hematoma and suspected leak from transverse colon anastomosis complication; 00/55 right hemicolectomy; 11/17 re-exploration w/partial descending colectomy; 12/8 IR percutaneous cholecystostomy tube and hepatectomy abscess drainage; 12/20 IR drains placed for perisplenic abscess)  · Patient does not wish to follow with [place] oncology anymore as it is too far of a drive; would like to establish with [place] oncology (already follows with Dr Gordo Rangel)  · Continue outpatient f/u    Anemia  Assessment & Plan  Recent Labs     02/19/23  0623 02/20/23  0521 02/20/23  1420   HGB 8 3* 7 3* 8 4*     · 2/16 received BT 1u PRBC  · No overt bleeding noted  · Monitor CBC, transfuse as needed  · Repeat hemoglobin this afternoon stable    Benign essential hypertension  Assessment & Plan  BP stable  · Continue prehospital HTN medications  · Monitor BP per unit protocol    Type 2 diabetes mellitus without complication, with long-term current use of insulin (HCC)  Assessment & Plan    Lab Results   Component Value Date    HGBA1C 8 0 (H) 09/26/2022     · Home Lantus 8U qHS and humalog 4U TID w/meals   · Dose reduce to 5U qhs + 4 U TID   · 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  · Hypoglycemia protocol  · Diabetic diet         VTE Pharmacologic Prophylaxis: VTE Score: 6 High Risk (Score >/= 5) - Pharmacological DVT Prophylaxis Ordered: heparin  Sequential Compression Devices Ordered  Patient Centered Rounds: I performed bedside rounds with nursing staff today  Discussions with Specialists or Other Care Team Provider: Case management, ID    Education and Discussions with Family / Patient: Patient declined call to        Total Time Spent on Date of Encounter in care of patient: 45 minutes This time was spent on one or more of the following: performing physical exam; counseling and coordination of care; obtaining or reviewing history; documenting in the medical record; reviewing/ordering tests, medications or procedures; communicating with other healthcare professionals and discussing with patient's family/caregivers  Current Length of Stay: 13 day(s)  Current Patient Status: Inpatient   Certification Statement: The patient will continue to require additional inpatient hospital stay due to Pending placement  Discharge Plan: Once with placement    Code Status: Level 1 - Full Code    Subjective:   Denies chest pain or shortness of breath  Able to ambulate with PT yesterday  No other complaints at this time  Very eager to go to rehab  Objective:     Vitals:   Temp (24hrs), Av 3 °F (36 8 °C), Min:96 7 °F (35 9 °C), Max:99 7 °F (37 6 °C)    Temp:  [96 7 °F (35 9 °C)-99 7 °F (37 6 °C)] 98 6 °F (37 °C)  HR:  [61-91] 80  Resp:  [17-18] 18  BP: (127-144)/(69-87) 137/84  SpO2:  [97 %-100 %] 99 %  Body mass index is 27 98 kg/m²  Input and Output Summary (last 24 hours): Intake/Output Summary (Last 24 hours) at 2023 1621  Last data filed at 2023 0530  Gross per 24 hour   Intake 1020 ml   Output 1725 ml   Net -705 ml       Physical Exam:   Physical Exam  Vitals and nursing note reviewed  Constitutional:       General: He is not in acute distress  Appearance: He is well-developed  He is ill-appearing (chronically)  He is not toxic-appearing  HENT:      Head: Normocephalic and atraumatic  Nose: Nose normal       Mouth/Throat:      Mouth: Mucous membranes are moist       Pharynx: Oropharynx is clear  Eyes:      Conjunctiva/sclera: Conjunctivae normal       Pupils: Pupils are equal, round, and reactive to light  Neck:      Vascular: No JVD  Cardiovascular:      Rate and Rhythm: Normal rate and regular rhythm  Pulses: Normal pulses  Pulmonary:      Effort: Pulmonary effort is normal  No respiratory distress        Breath sounds: Normal breath sounds  Abdominal:      General: Bowel sounds are normal       Palpations: Abdomen is soft  Tenderness: There is no abdominal tenderness  There is no guarding  Comments: (+) drains x2, (+) colostomy   Musculoskeletal:         General: No swelling, tenderness or deformity  Normal range of motion  Cervical back: Normal range of motion and neck supple  No rigidity  Lymphadenopathy:      Cervical: No cervical adenopathy  Skin:     General: Skin is warm and dry  Findings: No erythema  Neurological:      General: No focal deficit present  Mental Status: He is alert and oriented to person, place, and time  Mental status is at baseline  Motor: No abnormal muscle tone  Psychiatric:         Mood and Affect: Mood normal          Thought Content: Thought content normal          Additional Data:     Labs:  Results from last 7 days   Lab Units 02/20/23  1420 02/20/23  0521 02/16/23  2148 02/16/23  0530   WBC Thousand/uL  --  10 23*   < > 10 42*   HEMOGLOBIN g/dL 8 4* 7 3*   < > 6 8*   HEMATOCRIT % 27 5* 23 9*   < > 23 2*   PLATELETS Thousands/uL  --  303   < > 345   NEUTROS PCT %  --   --   --  67   LYMPHS PCT %  --   --   --  18   MONOS PCT %  --   --   --  12   EOS PCT %  --   --   --  0    < > = values in this interval not displayed  Results from last 7 days   Lab Units 02/20/23  0521 02/16/23  0530 02/15/23  1412   SODIUM mmol/L 136   < > 131*   POTASSIUM mmol/L 3 4*   < > 4 1   CHLORIDE mmol/L 106   < > 103   CO2 mmol/L 20*   < > 21   BUN mg/dL 12   < > 22   CREATININE mg/dL 1 38*   < > 1 21   ANION GAP mmol/L 10   < > 7   CALCIUM mg/dL 7 5*   < > 7 9*   ALBUMIN g/dL  --   --  2 0*   TOTAL BILIRUBIN mg/dL  --   --  0 60   ALK PHOS U/L  --   --  723*   ALT U/L  --   --  9   AST U/L  --   --  31   GLUCOSE RANDOM mg/dL 127   < > 172*    < > = values in this interval not displayed           Results from last 7 days   Lab Units 02/20/23  1610 02/20/23  1102 02/20/23  0720 02/19/23  2153 02/19/23  1736 02/19/23  1124 02/19/23  0715 02/18/23  2052 02/18/23  1700 02/18/23  1246 02/18/23  0809 02/17/23 2047   POC GLUCOSE mg/dl 167* 200* 110 169* 132 140 119 165* 347* 149* 151* 138         Results from last 7 days   Lab Units 02/16/23  0530 02/15/23  1412   PROCALCITONIN ng/ml 0 51* 0 46*       Lines/Drains:  Invasive Devices     Central Venous Catheter Line  Duration           Port A Cath 03/10/22 Right Chest 347 days          Drain  Duration           Ileostomy LUQ 95 days    Cholecystostomy Tube 70 days    Abscess Drain Abdomen 39 days                Central Line:  Goal for removal: N/A - Chronic PICC             Imaging: No pertinent imaging reviewed      Recent Cultures (last 7 days):         Last 24 Hours Medication List:   Current Facility-Administered Medications   Medication Dose Route Frequency Provider Last Rate   • acetaminophen  650 mg Oral Q4H PRN Veronika Hameed PA-C     • amLODIPine  2 5 mg Oral Daily Veronika Hameed PA-C     • aspirin  81 mg Oral Daily [place], [place]     • atorvastatin  40 mg Oral Daily [place], [place]     • collagenase   Topical Daily Sabas Lopez PA-C     • DULoxetine  30 mg Oral Daily [place], [place]     • heparin (porcine)  5,000 Units Subcutaneous [facility] Jelly Adrian MartyNANETTE     • HYDROmorphone  0 2 mg Intravenous Q4H PRN Neliapaulette Ambrosio, DO     • influenza vaccine  0 5 mL Intramuscular Prior to discharge Joanna Hart MD     • insulin glargine  5 Units Subcutaneous HS Kiara Wang PA-C     • insulin lispro  1-6 Units Subcutaneous TID AC Jelly Adrian [place] NANETTE sanchez     • insulin lispro  1-6 Units Subcutaneous HS Veronika Hameed PA-C     • insulin lispro  4 Units Subcutaneous TID With Meals Veronika Hameed PA-C     • melatonin  6 mg Oral HS PRN [place] Nina PA-C     • methocarbamol  500 mg Oral BID PRN Veronika Hameed PA-C     • ondansetron  4 mg Intravenous Q4H PRN Neliapaulette Ambrosio, DO     • oxyCODONE  5 mg Oral Q4H PRN Nelia Kenzie Ambrosio DO     • oxyCODONE  2 5 mg Oral Q4H PRN Neliapaulette Ambrosio DO     • pantoprazole  40 mg Oral BID Tiffany Joe PA-C     • pneumococcal 13-valent conjugate vaccine  0 5 mL Intramuscular Prior to discharge Jalyn Perez MD     • simethicone  80 mg Oral 4x Daily (with meals and at bedtime) Tiffany Joe PA-C     • sodium chloride (PF)  10 mL Intravenous Daily Nelia Ambrosio DO     • tamsulosin  0 4 mg Oral Daily With [address] R[address] PA-C          Today, Patient Was Seen By: Lanette Maldonado MD    **Please Note: This note may have been constructed using a voice recognition system  **

## 2023-02-20 NOTE — ASSESSMENT & PLAN NOTE
Follows w/ outpatient [facility] onc and palliative care   Extensive abdominal surgical history (11/7 ex lap w/ section 3 liver resection, and development of left upper quadrant hematoma and suspected leak from transverse colon anastomosis complication; 77/79 right hemicolectomy; 11/17 re-exploration w/partial descending colectomy; 12/8 IR percutaneous cholecystostomy tube and hepatectomy abscess drainage; 12/20 IR drains placed for perisplenic abscess)  · Patient does not wish to follow with [address] oncology anymore as it is too far of a drive; would like to establish with [facility] oncology (already follows with Dr Moon Figueroa)  · Continue outpatient f/u

## 2023-02-20 NOTE — ASSESSMENT & PLAN NOTE
Lab Results   Component Value Date    EGFR 55 02/20/2023    EGFR 57 02/19/2023    EGFR 55 02/18/2023    CREATININE 1 38 (H) 02/20/2023    CREATININE 1 35 (H) 02/19/2023    CREATININE 1 38 (H) 02/18/2023     · POA with creatinine 2 52; baseline 1-1 3  · Suspecting possible multifactorial cause in setting of dehydration and sepsis   Also likely Vancomycin contributing  · Avoid nephrotoxic agents  · Steadily downtrending

## 2023-02-20 NOTE — ASSESSMENT & PLAN NOTE
Recent Labs     02/19/23  0623 02/20/23  0521 02/20/23  1420   HGB 8 3* 7 3* 8 4*     · 2/16 received BT 1u PRBC  · No overt bleeding noted  · Monitor CBC, transfuse as needed  · Repeat hemoglobin this afternoon stable

## 2023-02-20 NOTE — PLAN OF CARE
Problem: OCCUPATIONAL THERAPY ADULT  Goal: Performs self-care activities at highest level of function for planned discharge setting  See evaluation for individualized goals  Description: Treatment Interventions: ADL retraining, Functional transfer training, UE strengthening/ROM, Endurance training, Patient/family training          See flowsheet documentation for full assessment, interventions and recommendations  Outcome: Progressing  Note: Limitation: Decreased ADL status  Prognosis: Good  Assessment: Patient participated in Skilled OT session this date with interventions consisting of ADL re training with the use of correct body mechnaics,  therapeutic activities to: increase activity tolerance and increase dynamic sit/ stand balance during functional activity   Patient agreeable to OT treatment session, upon arrival patient was found supine in bed and in no apparent distress  Patient completed bed mobility with supervision and functional transfers with min assist  While seated in recliner, pt required sup-min assist for UB ADLs and min-mod assist for LB ADLs  Pt completed 1 set x 20 reps of BUE exercises  In comparison to previous session, patient with improvements in endurance and balance  Patient requiring one step directives and frequent rest periods  Patient continues to be functioning below baseline level, occupational performance remains limited secondary to factors listed above and increased risk for falls and injury  From OT standpoint, recommendation at time of d/c would be Post acute rehabilitation services  Patient to benefit from continued Occupational Therapy treatment while in the hospital to address deficits as defined above and maximize level of functional independence with ADLs and functional mobility       OT Discharge Recommendation: Post acute rehabilitation services     Donis ALBERTO, OTR/L

## 2023-02-20 NOTE — PROGRESS NOTES
PHYSICAL MEDICINE AND REHABILITATION   PREADMISSION ASSESSMENT     Projected [facility] Diagnoses:  Impairment of mobility, safety and Activities of Daily Living (ADLs) due to Debility:  16  Debility (Non-cardiac/Non-pulmonary)  Etiologic: debility secondary to sepsis  Date of Onset: 2/7/23   Date of surgery: n/a    PATIENT INFORMATION  Name: Rd Quintanilla Phone #: [phone] (home)   Address: [address] [place] [address]  YOB: 1965 Age: 61 y o  SS#   Marital Status: /Civil Union  Ethnicity: White  Employment Status: on disability  Extended Emergency Contact Information  Primary Emergency Contact: Vika Fuentes  Address: [address]  Mobile Phone: [phone]  Relation: Spouse  Secondary Emergency Contact: Laayannadickson Roy  Mobile Phone: [phone]  Relation: Brother  Advance Directive: Level 1 Full Code (no ACP docs)    INSURANCE/COVERAGE:     Primary Payor: Sarah Cid / Plan: Sarah Cid / Product Type: HMO Commercial /  #[id number] Secondary Payer: Self Pay   Payer Contact: Francia Graf Payer Contact:   Contact Phone: [phone] [facility]  [id number] Fax: [phone] Contact Phone:     Authorization #: [id number]  Coverage Dates: 2/21/23-2/28/23  LCD: 2/28/23 Fax updates to [phone]  MEDICARE #:    Medicare Days:   Medical Record #: [id number]    REFERRAL SOURCE:    Referring provider: Robert Tomas DO  Referring facility: [address] [place] [address]  Room: /-01  PCP: Ana Senior MD PCP phone number: [phone]    MEDICAL INFORMATION  HPI:   This is a 58-year-old male with a PMH of colon CA w/extensive abdominal surgery (11/7 ex lap w/ section 3 liver resection, and development of left upper quadrant hematoma and suspected leak from transverse colon anastomosis complication; 93/92 right hemicolectomy; 11/17 re-exploration w/partial descending colectomy; 12/8 IR percutaneous cholecystostomy tube and hepatectomy abscess drainage; 12/20 IR drains placed for perisplenic abscess), T2DM, CKD, HTN, and HLD who presented to [address] [place] [address] [facility] ER on 2/7/23 with increasing fatigue and generalized weakness as well as development of foul-smelling drainage and increasing pain from his chronic abdominal wound  Also with c/o mild SOB  Patient was discharged from [facility] on 1/17 after a prolonged hospitalization  During current hospitalization, he was found to be septic in the setting of abdominal infection  Abdominal wound was debrided at bedside on 2/8 and local wound care with wet dressing has been ordered for same  Wound cultures showed ESBL and MRSA  He completed a course of Ertapenem and Vancomycin  Patient also found to have ALEXY, hyperkalemia, hyponatremia, elevated alkaline phosphatase level-Chronically elevated likely in setting of known hepatic metastasis and RUQ US revealed a 7 mm nonobstructing right intrarenal calculus, however, no hydronephrosis was noted on CT a/p  Patient currently has several abdominal drains in place since last hospitalization  CT of chest, abdomen and pelvis showed improvement in the fluid collections at the left liver lobe, perisplenic area, and the left retroperitoneum inferior to the spleen  He is s/p IR tube check and drains were kept in place  Discussed with IR and plan is to f/u in 1 month  CT also showed interval decrease in bilateral pleural effusions, however multiple small bilateral lung nodules are seen which are concerning for possible metastatic lung disease  Patient reports having prior known lung nodules  On 2/16, patient hgb dropped to 6 8  He was transfused 1 unit PRBC and repeat hgb was 8 1  No overt bleeding monitored and hgb has remained stable  On 2/22, his white count was elevated and patient became febrile  Blood cultures x 2 were drawn and showed *** Per ID, low threshold for CT abdomen      Patient worked with PT and OT and recommended for rehab following his hospital stay  He has demonstrated that he can tolerate three hours of therapy, five days a week and is now medically cleared for discharge to [facility]  Past Medical History:   Past Surgical History: Allergies:     Past Medical History:   Diagnosis Date   • Abdominal pain 03/12/2022   • Acute renal failure ([place] [address] )     [id number] resolved   • Acute respiratory failure with hypoxia ([place] [address] ) 11/12/2022   • Bacteremia 11/12/2022   • Cancer ([place] [address] )    • Diabetes mellitus ([place] [address] )    • Enteritis 08/23/2016   • Flash pulmonary edema ([place] [address] ) 11/12/2022   • Gastroparesis due to DM ([place] [address] ) 08/23/2016   • GERD (gastroesophageal reflux disease)    • Hernia, ventral 08/04/2016   • Hyperlipidemia    • Hypertension    • Morbid obesity ([place] [address] ) 04/17/2018   • Postoperative visit 03/02/2022   • SIRS (systemic inflammatory response syndrome) ([place] [address] ) 03/12/2022   • Snoring    • Stage 3a chronic kidney disease ([place] [address] ) 02/19/2022    Past Surgical History:   Procedure Laterality Date   • COLONOSCOPY     • COLOSTOMY N/A 02/20/2022    Procedure: COLOSTOMY LOOP, diverting;  Surgeon: Henrik Morse MD;  Location: [facility];  Service: General   • ESOPHAGOGASTRODUODENOSCOPY N/A 08/24/2016    Procedure: ESOPHAGOGASTRODUODENOSCOPY (EGD); Surgeon: Clive Lopez MD;  Location: [facility];   Service:    • EXPLORATORY LAPAROTOMY W/ BOWEL RESECTION N/A 11/16/2022    Procedure: LAPAROTOMY EXPLORATORY W/ BOWEL RESECTION- VAC PLACEMENT;  Surgeon: Rishi Mendez MD;  Location: [facility];  Service: Surgical Oncology   • EXPLORATORY LAPAROTOMY W/ BOWEL RESECTION N/A 11/17/2022    Procedure: LAPAROTOMY EXPLORATORY W/ BOWEL RESECTION;  Surgeon: Rishi Mendez MD;  [facility]: [facility];  Service: Surgical Oncology   • ILEOSTOMY N/A 11/17/2022    Procedure: Shea Rad;  Surgeon: Rishi Mendez MD;  [facility];  Service: Surgical Oncology   • ILEOSTOMY CLOSURE N/A 11/7/2022    Procedure: REVERSAL COLOSTOMY;  Surgeon: Matthew Kay MD;  [facility];  Service: Surgical Oncology   • IR CHOLECYSTOSTOMY TUBE CHECK/CHANGE/REPOSITION/REINSERTION/UPSIZE  12/12/2022   • IR CHOLECYSTOSTOMY TUBE PLACEMENT  12/8/2022   • IR DRAINAGE TUBE CHECK AND/OR REMOVAL  1/3/2023   • IR DRAINAGE TUBE CHECK/CHANGE/REPOSITION/REINSERTION/UPSIZE  1/12/2023   • IR DRAINAGE TUBE CHECK/CHANGE/REPOSITION/REINSERTION/UPSIZE  1/31/2023   • IR DRAINAGE TUBE CHECK/CHANGE/REPOSITION/REINSERTION/UPSIZE  2/10/2023   • IR DRAINAGE TUBE CHECK/CHANGE/REPOSITION/REINSERTION/UPSIZE  2/16/2023   • IR DRAINAGE TUBE PLACEMENT  12/8/2022   • IR DRAINAGE TUBE PLACEMENT  12/20/2022   • IR PORT PLACEMENT  03/10/2022   • IR THORACENTESIS  1/12/2023   • KIDNEY STONE SURGERY     • LIVER BIOPSY LAPAROSCOPIC N/A 02/20/2022    Procedure: DIAGNOSTIC LAPAROSCOPIC LIVER BIOPSY, DIVERTING LOOP COLOSTOMY ;  Surgeon: Nan Oh MD;  [facility];  Service: General   • LIVER LOBECTOMY N/A 11/7/2022    Procedure: SEGMENT 3 LIVER RESECTION, ABLATION, INTRAOPERATIVE U/S OF LIVER, PERITONEAL BIOPSY;  Surgeon: Matthew Kay MD;  [facility];  Service: Surgical Oncology   • RIGHT COLON RESECTION N/A 11/7/2022    Procedure: EX LAP, TRANVERSE COLON RESECTION;  Surgeon: Matthew Kay MD;  [facility];  Service: Surgical Oncology   • TONSILLECTOMY     • UMBILICAL HERNIA REPAIR LAPAROSCOPIC N/A 04/26/2019    Procedure: LAPAROSCOPIC UMBILICAL HERNIA REPAIR;  Surgeon: Nan Oh MD;  [facility];  Service: General   • VAC DRESSING APPLICATION N/A 73/95/5533    Procedure: APPLICATION VAC DRESSING ABDOMEN/TRUNK;  Surgeon: Adrianna Galarza MD;  [facility];  Service: Surgical Oncology     Allergies   Allergen Reactions   • Shellfish-Derived Products - Food Allergy Anaphylaxis   • Erythromycin GI Intolerance         Medical/functional conditions requiring inpatient rehabilitation:   Sepsis  Chronic abdominal wound with foul smelling drainage s/p bedside debridement  Intra-abdominal abscesses  ESBL and E coli bacteremia/abscess formation  ALEXY  Nephrolithiasis  Bilateral pleural effusions  Elevated alkaline phosphatase level  Hypokalemia  Hyponatremia  Colon cancer with mets to liver  Anemia  HTN  Type 2 DM  Hyperlipidemia    Risk for medical/clinical complications: risk for falls and injury related to falls, risk for skin breakdown, risk for sepsis, risk for further complications with abdominal wound, risk for electrolyte imbalance, risk for anemia, risk for DVT/PE/CVA, risk for complications with drains    Comorbidities/Surgeries in the last 100 days:   11/7/22-ex lap w/ section 3 liver resection, and development of left upper quadrant hematoma and suspected leak from transverse colon anastomosis complication  73/08/91-QOXPG hemicolectomy  11/17/2224-an-cdtpiugxeag w/partial descending colectomy  12/8/22-IR percutaneous cholecystostomy tube and hepatectomy abscess drainage  12/20/22-IR drains placed for perisplenic abscess  2/8/23-bedside debridement of abdominal wound  Chronic abdominal wound  Colon cancer with mets to liver  HTN  Type 2 DM  Hyperlipidemia  H/o flash pulmonary edema  GERD  Gastroparesis  Stage 3 kidney disease  H/o bacteremia    CURRENT VITAL SIGNS:   Temp:  [97 4 °F (36 3 °C)-100 2 °F (37 9 °C)] 97 4 °F (36 3 °C)  HR:  [] 99  Resp:  [20] 20  BP: (111-147)/(76-91) 111/76   Intake/Output Summary (Last 24 hours) at 2/22/2023 1141  Last data filed at 2/22/2023 0501  Gross per 24 hour   Intake --   Output 1590 ml   Net -1590 ml        LABORATORY RESULTS:      Lab Results   Component Value Date    HGB 8 0 (L) 02/22/2023    HGB 13 6 07/21/2017    HCT 25 6 (L) 02/22/2023    HCT 39 3 07/21/2017    WBC 15 26 (H) 02/22/2023    WBC 10 8 07/21/2017     Lab Results   Component Value Date    BUN 15 02/22/2023    BUN 21 09/26/2022     05/02/2017    K 3 8 02/22/2023    K 4 1 09/26/2022     02/22/2023     09/26/2022    GLUCOSE 151 (H) 11/16/2022    GLUCOSE 118 (H) 05/02/2017    CREATININE 1 45 (H) 02/22/2023    CREATININE 1 18 05/02/2017     Lab Results   Component Value Date    PROTIME 16 1 (H) 02/07/2023    INR 1 32 (H) 02/07/2023        DIAGNOSTIC STUDIES:  CT chest abdomen pelvis wo contrast    Result Date: 2/7/2023  Impression: 1  Interval decrease in bilateral pleural effusions with development of multiple small bilateral lung nodules concerning for metastatic lung disease  2   Fluid collection at the level of the left lobe of the liver is smaller with resolution of the other perisplenic fluid collections and improvement in collections in the left retroperitoneum inferior to the spleen  The study was marked in [facility] for immediate notification  Workstation performed: [id number]     US right upper quadrant    Result Date: 2/8/2023  Impression: Decompressed gallbladder around a cholecystostomy tube, limiting evaluation  Hepatomegaly  Heterogeneous echotexture of the liver in this patient with known metastatic disease  7 mm nonobstructing right intrarenal calculus  Workstation performed: [id number]     IR drainage tube check/change/reposition/reinsertion/upsize    Result Date: 2/10/2023  Impression: Impression: Cholecystostomy is identified within the gallbladder however small bowel opacifies prior to good opacification of the cystic and common ducts raising question of a choleenteric fistula  Abscess catheter in good position however output is persistent   Follow-up tube check in approximately one week Workstation performed: [id number]       PRECAUTIONS/SPECIAL NEEDS:  Isolation Precautions:  Contact, Blood Sugar Management: as per MD orders, Pain Management, Dietary Restrictions: regular diet with nutrition supplements, Visually Impaired, Language Preference: English and fall precautions, patient has cholecystostomy tube, abdominal abscess drain and ileostomy       MEDICATIONS:     Current Facility-Administered Medications:   •  acetaminophen (TYLENOL) tablet 650 mg, 650 mg, Oral, Q4H PRN, Delta Mode, PA-C, 650 mg at 02/21/23 2334  •  amLODIPine (NORVASC) tablet 2 5 mg, 2 5 mg, Oral, Daily, Delta Mode, PA-C, 2 5 mg at 02/22/23 2462  •  aspirin chewable tablet 81 mg, 81 mg, Oral, Daily, Delta Mode, PA-C, 81 mg at 02/22/23 8864  •  atorvastatin (LIPITOR) tablet 40 mg, 40 mg, Oral, Daily, Delta Mode, PA-C, 40 mg at 02/22/23 4690  •  collagenase (SANTYL) ointment, , Topical, Daily, Chick Fusi, PA-C, Given at 02/22/23 0932  •  DULoxetine (CYMBALTA) delayed release capsule 30 mg, 30 mg, Oral, Daily, Delta Mode, PA-C, 30 mg at 02/22/23 3537  •  heparin (porcine) subcutaneous injection 5,000 Units, 5,000 Units, Subcutaneous, Q8H [facility], Delta Mode, PA-C, 5,000 Units at 02/16/23 0530  •  HYDROmorphone HCl (DILAUDID) injection 0 2 mg, 0 2 mg, Intravenous, Q4H PRN, Nelia Ambrosio DO, 0 2 mg at 02/21/23 2334  •  influenza vaccine, recombinant, quadrivalent (FLUBLOK) IM injection 0 5 mL, 0 5 mL, Intramuscular, Prior to discharge, Dipesh Vanegas MD  •  insulin glargine (LANTUS) subcutaneous injection 5 Units 0 05 mL, 5 Units, Subcutaneous, HS, NADIYA GarcesC, 5 Units at 02/21/23 2229  •  insulin lispro (HumaLOG) 100 units/mL subcutaneous injection 1-6 Units, 1-6 Units, Subcutaneous, TID AC, 1 Units at 02/20/23 1753 **AND** Fingerstick Glucose (POCT), , , 4x Daily AC and at bedtime, Delta Mode, PA-C  •  insulin lispro (HumaLOG) 100 units/mL subcutaneous injection 1-6 Units, 1-6 Units, Subcutaneous, HS, Delta Mode, PA-C, 1 Units at 02/20/23 2243  •  insulin lispro (HumaLOG) 100 units/mL subcutaneous injection 4 Units, 4 Units, Subcutaneous, TID With Meals, Delta Mode, PA-C, 4 Units at 02/22/23 4475  •  melatonin tablet 6 mg, 6 mg, Oral, HS PRN, Abimael Mulligan, PA-C, 6 mg at 02/21/23 2334  •  methocarbamol (ROBAXIN) tablet 500 mg, 500 mg, Oral, BID PRN, Delta Mode, PA-C, 500 mg at 02/07/23 2118  •  multi-electrolyte (PLASMALYTE-A/ISOLYTE-S PH 7 4) IV solution, 100 mL/hr, Intravenous, Continuous, Nelia Kenzie Saraiya, DO  •  ondansetron (ZOFRAN) injection 4 mg, 4 mg, Intravenous, Q4H PRN, Nelia Kenzie Saraiya, DO, 4 mg at 02/11/23 2129  •  oxyCODONE (ROXICODONE) IR tablet 5 mg, 5 mg, Oral, Q4H PRN, Nelia Kenzie Saraiya, DO, 5 mg at 02/21/23 2229  •  oxyCODONE (ROXICODONE) split tablet 2 5 mg, 2 5 mg, Oral, Q4H PRN, Nelia Kenzie Saraiya, DO  •  pantoprazole (PROTONIX) EC tablet 40 mg, 40 mg, Oral, BID, Bran Blanco PA-C, 40 mg at 02/22/23 8618  •  pneumococcal 13-valent conjugate vaccine (PREVNAR-13) IM injection 0 5 mL, 0 5 mL, Intramuscular, Prior to discharge, Mary Perez MD  •  simethicone (MYLICON) chewable tablet 80 mg, 80 mg, Oral, 4x Daily (with meals and at bedtime), Bran Blanco PA-C, 80 mg at 02/22/23 3158  •  sodium chloride (PF) 0 9 % injection 10 mL, 10 mL, Intravenous, Daily, [facility] Amb[facility]io, DO, 10 mL at 02/22/23 0932  •  tamsulosin (FLOMAX) capsule 0 4 mg, 0 4 mg, Oral, Daily With Dinner, Bran Blanco PA-C, 0 4 mg at 02/21/23 1734    SKIN INTEGRITY:   Chronic abdominal wound with abscess requiring flushing with NS, amber thick Santyl on slough and 4x4 moistened with NS covered with abd  Ileostomy  cholecystostomy tube  abdominal abscess drain     PRIOR LEVEL OF FUNCTION:  He lives in a(n) single family home  [employer] [facility] is  and lives with their family  Self Care: Needed some help, Indoor Mobility: ambulated with a RW following discharge from rehab on 1/17/23 and Cognition: Independent    FALLS IN THE LAST 6 MONTHS: 2    HOME ENVIRONMENT:  The living area: Patient lives in a multi-level home  He is able to live on the main level where there is a 1/2 bath and bedroom  He was sponge bathing prior to be admitted to the hospital and was not using the bathroom  Patient has an ileostomy  There are 3 steps to enter the home      The patient will not have 24 hour supervision/physical assistance available upon discharge  PREVIOUS DME:  Equipment in home (previous DME): Manual Wheelchair and walker    FUNCTIONAL STATUS:***  Physical Therapy Occupational Therapy Speech Therapy   02/22/23 0745    PT Last Visit   PT Visit Date 02/22/23   Note Type   Note Type Treatment for insurance authorization   Pain Assessment   Pain Assessment Tool 0-10   Pain Score No Pain   Restrictions/Precautions   Weight Bearing Precautions Per Order No   Braces or Orthoses Other (Comment)  (none reported)   Other Precautions Contact/isolation; Fall Risk   General   Chart Reviewed Yes   Response to Previous Treatment Patient with no complaints from previous session  Family/Caregiver Present No   Cognition   Overall Cognitive Status WFL   Arousal/Participation Alert; Cooperative   Attention Within functional limits   Orientation Level Oriented X4   Memory Within functional limits   Following Commands Follows all commands and directions without difficulty   Comments Pt agreeable to PT   Bed Mobility   Supine to Sit 5  Supervision   Additional items Assist x 1;HOB elevated; Bedrails; Increased time required;Verbal cues   Additional Comments Pt received at start of session supine in bed with HOB elevated  Following session, pt remained in recliner with needs met, alarm activated and call bell within reach   Transfers   Sit to Stand 4  Minimal assistance   Additional items Assist x 1; Armrests; Increased time required;Verbal cues   Stand to Sit 4  Minimal assistance   Additional items Assist x 1; Armrests; Increased time required;Verbal cues   Additional Comments with use of RW, VCs for hand placements   Ambulation/Elevation   Gait pattern Decreased foot clearance; Inconsistent farhan; Short stride;Decreased heel strike   Gait Assistance 4  Minimal assist   Additional items Assist x 1;Verbal cues   Assistive Device Rolling walker   Distance 80'   Stair Management Assistance Not tested Balance   Static Sitting Fair +   Dynamic Sitting Fair   Static Standing Fair -   Dynamic Standing Fair -   Ambulatory Fair -   Endurance Deficit   Endurance Deficit Yes   Endurance Deficit Description decreased activity tolerance   Activity Tolerance   Activity Tolerance Patient limited by fatigue   Nurse Made Aware RN [place]   Exercises   Heelslides Supine;20 reps;AROM; Bilateral   Hip Flexion Sitting;10 reps;AROM; Bilateral   Knee AROM Long Arc Quad Sitting;10 reps;AROM; Bilateral   Ankle Pumps Sitting;20 reps;AROM; Bilateral   Assessment   Prognosis Good   Problem List Decreased strength;Decreased endurance; Impaired balance;Decreased mobility; Decreased safety awareness;Pain   Assessment Pt seen for PT treatment session this date with interventions consisting of gait training w/ emphasis on improving pt's ability to ambulate level surfaces x 80' with min A provided by therapist with RW, Therapeutic exercise consisting of: BLE exercises performed supine and seated and therapeutic activity consisting of training: bed mobility, supine<>sit transfers, sit<>stand transfers and vc and tactile cues for static standing posture faciliation  Pt agreeable to PT treatment session upon arrival, pt found supine in bed w/ HOB elevated, in no apparent distress and responsive  In comparison to previous session, pt with improvements in LE strength, balance and mobility  Post session: pt returned back to recliner, chair alarm engaged, all needs in reach and RN notified of session findings/recommendations  Continue to recommend post acute rehabilitation services at time of d/c in order to maximize pt's functional independence and safety w/ mobility   Pt continues to be functioning below baseline level, and remains limited 2* factors listed above and including continued need for medical management and monitoring, decreased strength and balance resulting in an increased risk for falls, decreased endurance and activity tolerance limiting mobility  PT will continue to see pt during current hospitalization in order to address the deficits listed above and provide interventions consistent w/ POC in effort to achieve STGs     02/20/23 1333    OT Last Visit   OT Visit Date 02/20/23   Note Type   Note Type Treatment for insurance authorization   Pain Assessment   Pain Assessment Tool 0-10   Pain Score No Pain   Restrictions/Precautions   Weight Bearing Precautions Per Order No   Other Precautions Contact/isolation; Chair Alarm; Bed Alarm;Multiple lines; Fall Risk   Lifestyle   Autonomy At baseline, pt is independent with ADLs, ambulatory with RW, and lives with wife   Reciprocal Relationships Supportive Family   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   ADL   Grooming Assistance 5  Supervision/Setup   Grooming Deficit Setup;Verbal cueing; Increased time to complete   UB Bathing Assistance 5  Supervision/Setup   UB Bathing Deficit Setup;Verbal cueing;Supervision/safety; Increased time to complete   LB Bathing Assistance 4  Minimal Assistance   LB Bathing Deficit Setup;Steadying;Verbal cueing;Supervision/safety; Increased time to complete;Right lower leg including foot; Left lower leg including foot   UB Dressing Assistance 4  Minimal Assistance   UB Dressing Deficit Setup;Verbal cueing;Supervision/safety; Increased time to complete;Pull around back; Fasteners   UB Dressing Comments Pt donned [facility] gown   LB Dressing Assistance 3  Moderate Assistance   LB Dressing Deficit Setup;Steadying;Verbal cueing;Supervision/safety; Increased time to complete; Don/doff R sock; Don/doff L sock   Toileting Comments Pt required assistance from RN to empty/manage colostomy bag   Bed Mobility   Supine to Sit 5  Supervision   Additional items Assist x 1;HOB elevated; Bedrails; Increased time required;Verbal cues   Sit to Supine    (DNT: pt seated OOB in recliner at end of session)   Additional Comments Pt denied lightheaded/dizziness with transitional movements   Transfers   Sit to Stand 4  Minimal assistance   Additional items Assist x 1;Bedrails; Increased time required   Stand to Sit 4  Minimal assistance   Additional items Assist x 1; Armrests; Increased time required   Functional Mobility   Functional Mobility 4  Minimal assistance   Additional Comments Pt ambulated short distance to recliner with no overt SOB  Pt grossly unsteady   Additional items Rolling walker   Therapeutic Exercise - ROM   UE-ROM Yes  (to increase strength and endurance to improve independence with ADLs)   ROM- Right Upper Extremities   R Shoulder AROM; Flexion; Extension   R Elbow AROM;Elbow extension;Elbow flexion   R Position Seated;Against gravity   R Weight/Reps/Sets 1 set x 20 reps each   ROM - Left Upper Extremities    L Shoulder AROM; Flexion; Extension   L Elbow AROM;Elbow flexion;Elbow extension   L Position Seated;Against gravity   L Weight/Reps/Sets 1 set x 20 reps each   Cognition   Overall Cognitive Status WFL   Arousal/Participation Alert; Cooperative   Attention Within functional limits   Orientation Level Oriented X4   Memory Within functional limits   Following Commands Follows all commands and directions without difficulty   Comments Pt agreeable to OT session   Activity Tolerance   Activity Tolerance Patient limited by fatigue   Assessment   Assessment Patient participated in Skilled OT session this date with interventions consisting of ADL re training with the use of correct body mechnaics,  therapeutic activities to: increase activity tolerance and increase dynamic sit/ stand balance during functional activity   Patient agreeable to OT treatment session, upon arrival patient was found supine in bed and in no apparent distress  Patient completed bed mobility with supervision and functional transfers with min assist  While seated in recliner, pt required sup-min assist for UB ADLs and min-mod assist for LB ADLs   Pt completed 1 set x 20 reps of BUE exercises  In comparison to previous session, patient with improvements in endurance and balance  Patient requiring one step directives and frequent rest periods  Patient continues to be functioning below baseline level, occupational performance remains limited secondary to factors listed above and increased risk for falls and injury  From OT standpoint, recommendation at time of d/c would be Post acute rehabilitation services  Patient to benefit from continued Occupational Therapy treatment while in the hospital to address deficits as defined above and maximize level of functional independence with ADLs and functional mobility  CARE SCORES:  Self Care:  Eating: {[facility] Pre Admission Screenin}  Oral hygiene: {[facility] Pre Admission Screenin}  Toilet hygiene: {[facility] Pre Admission Screenin}  Shower/bathing self: {[facility] Pre Admission Screenin}  Upper body dressing: {ARC Pre Admission Screenin}  Lower body dressing: {ARC Pre Admission Screenin}  Putting on/taking off footwear: {ARC Pre Admission Screenin}  Transfers:  Roll left and right: 09: Not applicable  Sit to lyin: Not applicable  Lying to sitting on side of bed: 04: Supervision or touching  assistance  Sit to stand: 03: Partial/moderate assistance  Chair/bed to chair transfer: 03: Partial/moderate assistance  Toilet transfer: 09: Not applicable  Mobility:  Walk 10 ft: 03: Partial/moderate assistance  Walk 50 ft with two turns: 03: Partial/moderate assistance  Walk 199VS: 09: Not applicable    CURRENT GAP IN FUNCTION  Prior to Admission: Functional Status: Left ARC on  He was able to progress to a S level using a RW, CGA on stairs, and (I) level with bed and w/c mobility  Min A for UB ADLs, Mod-Max LB ADLs, total assistance for footwear    Patient has ileostomy    Estimated length of stay: 10 to 14 days    Anticipated Post-Discharge Disposition/Treatment  Disposition: Return to previous home/apartment  Outpatient Services: Physical Therapy (PT) and Occupational Therapy (OT)    BARRIERS TO DISCHARGE  Home Accessibility and Caregiver Accessibility; Decreased ADL status; Decreased strength;Decreased endurance; Impaired balance;Decreased mobility; Decreased safety awareness;    INTERVENTIONS FOR DISCHARGE  ADL retraining;Functional transfer training; Endurance training;Patient/family training; Compensatory technique education;LE strengthening/ROM; Therapeutic exercise; Bed mobility;Gait training;Equipment eval/education    REQUIRED THERAPY:  Patient will require PT and OT 90 minutes each per day, five days per week to achieve rehab goals  REQUIRED FUNCTIONAL AND MEDICAL MANAGEMENT FOR INPATIENT REHABILITATION:  Skin:  Patient requires close monitoring of skin for any breakdown secondary to decreased mobility  He also requires close monitoring of drain and tube sites and abdominal wound  , Pain Management: Overall pain is moderately controlled, Deep Vein Thrombosis (DVT) Prophylaxis:  heparin and SCD's while in bed, Diabetes Management: continue sliding scale insulin, patient to do finger sticks as ordered, SLIM to continue to manage diabetes, and other medical conditions, PT and OT interventions    Any labs, consults, imaging and medication adjustments patient may need while on ARC    RECOMMENDED LEVEL OF CARE: ***

## 2023-02-20 NOTE — OCCUPATIONAL THERAPY NOTE
Occupational Therapy Treatment Note     Patient Name: Savage Holloway  XRLWP'P Date: 2/20/2023  Problem List  Principal Problem:    Sepsis [facility])  Active Problems:    Type 2 diabetes mellitus without complication, with long-term current use of insulin (HCC)    Benign essential hypertension    Anemia    Colon cancer metastasized to liver [facility])    Acute on chronic kidney failure (HCC)    Elevated alkaline phosphatase level    Abnormal CT scan    Nephrolithiasis        02/20/23 1333   OT Last Visit   OT Visit Date 02/20/23   Note Type   Note Type Treatment for insurance authorization   Pain Assessment   Pain Assessment Tool 0-10   Pain Score No Pain   Restrictions/Precautions   Weight Bearing Precautions Per Order No   Other Precautions Contact/isolation; Chair Alarm; Bed Alarm;Multiple lines; Fall Risk   Lifestyle   Autonomy At baseline, pt is independent with ADLs, ambulatory with RW, and lives with wife   Reciprocal Relationships Supportive Family   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   ADL   Grooming Assistance 5  Supervision/Setup   Grooming Deficit Setup;Verbal cueing; Increased time to complete   UB Bathing Assistance 5  Supervision/Setup   UB Bathing Deficit Setup;Verbal cueing;Supervision/safety; Increased time to complete   LB Bathing Assistance 4  Minimal Assistance   LB Bathing Deficit Setup;Steadying;Verbal cueing;Supervision/safety; Increased time to complete;Right lower leg including foot; Left lower leg including foot   UB Dressing Assistance 4  Minimal Assistance   UB Dressing Deficit Setup;Verbal cueing;Supervision/safety; Increased time to complete;Pull around back; Fasteners   UB Dressing Comments Pt [facility] gown   LB Dressing Assistance 3  Moderate Assistance   LB Dressing Deficit Setup;Steadying;Verbal cueing;Supervision/safety; Increased time to complete; Don/doff R sock; Don/doff L sock   Toileting Comments Pt required assistance from RN to empty/manage colostomy bag   Bed Mobility   Supine to Sit 5  Supervision   Additional items Assist x 1;HOB elevated; Bedrails; Increased time required;Verbal cues   Sit to Supine   (DNT: pt seated OOB in recliner at end of session)   Additional Comments Pt denied lightheaded/dizziness with transitional movements   Transfers   Sit to Stand 4  Minimal assistance   Additional items Assist x 1;Bedrails; Increased time required   Stand to Sit 4  Minimal assistance   Additional items Assist x 1; Armrests; Increased time required   Functional Mobility   Functional Mobility 4  Minimal assistance   Additional Comments Pt ambulated short distance to recliner with no overt SOB  Pt grossly unsteady   Additional items Rolling walker   Therapeutic Exercise - ROM   UE-ROM Yes  (to increase strength and endurance to improve independence with ADLs)   ROM- Right Upper Extremities   R Shoulder AROM; Flexion; Extension   R Elbow AROM;Elbow extension;Elbow flexion   R Position Seated;Against gravity   R Weight/Reps/Sets 1 set x 20 reps each   ROM - Left Upper Extremities    L Shoulder AROM; Flexion; Extension   L Elbow AROM;Elbow flexion;Elbow extension   L Position Seated;Against gravity   L Weight/Reps/Sets 1 set x 20 reps each   Cognition   Overall Cognitive Status WFL   Arousal/Participation Alert; Cooperative   Attention Within functional limits   Orientation Level Oriented X4   Memory Within functional limits   Following Commands Follows all commands and directions without difficulty   Comments Pt agreeable to OT session   Activity Tolerance   Activity Tolerance Patient limited by fatigue   Assessment   Assessment Patient participated in Skilled OT session this date with interventions consisting of ADL re training with the use of correct body mechnaics,  therapeutic activities to: increase activity tolerance and increase dynamic sit/ stand balance during functional activity    Patient agreeable to OT treatment session, upon arrival patient was found supine in bed and in no apparent distress  Patient completed bed mobility with supervision and functional transfers with min assist  While seated in recliner, pt required sup-min assist for UB ADLs and min-mod assist for LB ADLs  Pt completed 1 set x 20 reps of BUE exercises  In comparison to previous session, patient with improvements in endurance and balance  Patient requiring one step directives and frequent rest periods  Patient continues to be functioning below baseline level, occupational performance remains limited secondary to factors listed above and increased risk for falls and injury  From OT standpoint, recommendation at time of d/c would be Post acute rehabilitation services  Patient to benefit from continued Occupational Therapy treatment while in the hospital to address deficits as defined above and maximize level of functional independence with ADLs and functional mobility  Plan   Treatment Interventions ADL retraining;Functional transfer training;UE strengthening/ROM; Endurance training;Patient/family training   Goal Expiration Date 02/22/23   OT Treatment Day 3   OT Frequency 3-5x/wk   Recommendation   OT Discharge Recommendation Post acute rehabilitation services   Additional Comments  The patient's raw score on the AM-PAC Daily Activity Inpatient Short Form is 17  A raw score of less than 19 suggests the patient may benefit from discharge to post-acute rehabilitation services  Please refer to the recommendation of the Occupational Therapist for safe discharge planning     AM-PAC Daily Activity Inpatient   Lower Body Dressing 2   Bathing 3   Toileting 2   Upper Body Dressing 3   Grooming 3   Eating 4   Daily Activity Raw Score 17   Daily Activity Standardized Score (Calc for Raw Score >=11) 37 26   AM-[facility] Applied Cognition Inpatient   Following a Speech/Presentation 4   Understanding Ordinary Conversation 4   Taking Medications 4   Remembering Where Things Are Placed or Put Away 4 Remembering List of 4-5 Errands 4   Taking Care of Complicated Tasks 4   Applied Cognition Raw Score 24   Applied Cognition Standardized Score 62 21   Modified [place] Scale   Modified [place] Scale 4   End of Consult   Patient Position at End of Consult Bedside chair; All needs within reach   Nurse Communication Nurse aware of consult     Dai Abdalla OTD, OTR/L

## 2023-02-20 NOTE — PLAN OF CARE
Problem: PAIN - ADULT  Goal: Verbalizes/displays adequate comfort level or baseline comfort level  Description: Interventions:  - Encourage patient to monitor pain and request assistance  - Assess pain using appropriate pain scale  - Administer analgesics based on type and severity of pain and evaluate response  - Implement non-pharmacological measures as appropriate and evaluate response  - Consider cultural and social influences on pain and pain management  - Notify physician/advanced practitioner if interventions unsuccessful or patient reports new pain  2/19/2023 2246 by Leon Brown  Outcome: Progressing  2/19/2023 2246 by Leon Brown  Outcome: Progressing     Problem: INFECTION - ADULT  Goal: Absence or prevention of progression during hospitalization  Description: INTERVENTIONS:  - Assess and monitor for signs and symptoms of infection  - Monitor lab/diagnostic results  - Monitor all insertion sites, i e  indwelling lines, tubes, and drains  - Monitor endotracheal if appropriate and nasal secretions for changes in amount and color  - [place] appropriate cooling/warming therapies per order  - Administer medications as ordered  - Instruct and encourage patient and family to use good hand hygiene technique  - Identify and instruct in appropriate isolation precautions for identified infection/condition  2/19/2023 2246 by Leon Brown  Outcome: Progressing  2/19/2023 2246 by Leon Brown  Outcome: Progressing  Goal: Absence of fever/infection during neutropenic period  Description: INTERVENTIONS:  - Monitor WBC    2/19/2023 2246 by Leon Brown  Outcome: Progressing  2/19/2023 2246 by Leon Brown  Outcome: Progressing     Problem: SAFETY ADULT  Goal: Patient will remain free of falls  Description: INTERVENTIONS:  - Educate patient/family on patient safety including physical limitations  - Instruct patient to call for assistance with activity   - Consult OT/PT to assist with strengthening/mobility   - Keep Call bell within reach  - Keep bed low and locked with side rails adjusted as appropriate  - Keep care items and personal belongings within reach  - Initiate and maintain comfort rounds  - Make Fall Risk Sign visible to staff  - Offer Toileting every 2 Hours, in advance of need  - Initiate/Maintain bedalarm  - Obtain necessary fall risk management equipment:   - Apply yellow socks and bracelet for high fall risk patients  - Consider moving patient to room near nurses station  2/19/2023 2246 by Jhonathan Cantu  Outcome: Progressing  2/19/2023 2246 by Jhonathan Cantu  Outcome: Progressing  Goal: Maintain or return to baseline ADL function  Description: INTERVENTIONS:  -  Assess patient's ability to carry out ADLs; assess patient's baseline for ADL function and identify physical deficits which impact ability to perform ADLs (bathing, care of mouth/teeth, toileting, grooming, dressing, etc )  - Assess/evaluate cause of self-care deficits   - Assess range of motion  - Assess patient's mobility; develop plan if impaired  - Assess patient's need for assistive devices and provide as appropriate  - Encourage maximum independence but intervene and supervise when necessary  - Involve family in performance of ADLs  - Assess for home care needs following discharge   - Consider OT consult to assist with ADL evaluation and planning for discharge  - Provide patient education as appropriate  2/19/2023 2246 by Jhonathan Cantu  Outcome: Progressing  2/19/2023 2246 by Jhonathan Cantu  Outcome: Progressing  Goal: Maintains/Returns to pre admission functional level  Description: INTERVENTIONS:  - Perform BMAT or MOVE assessment daily    - Set and communicate daily mobility goal to care team and patient/family/caregiver  - Collaborate with rehabilitation services on mobility goals if consulted  - Perform Range of Motion 3 times a day  - Reposition patient every 2 hours    - Dangle patient 3 times a day  - Stand patient 3 times a day  - Ambulate patient 3 times a day  - Out of bed to chair 3 times a day   - Out of bed for meals 3 times a day  - Out of bed for toileting  - Record patient progress and toleration of activity level   2/19/2023 2246 by Krystal Sauceda  Outcome: Progressing  2/19/2023 2246 by Krystal Sauceda  Outcome: Progressing     Problem: DISCHARGE PLANNING  Goal: Discharge to home or other facility with appropriate resources  Description: INTERVENTIONS:  - Identify barriers to discharge w/patient and caregiver  - Arrange for needed discharge resources and transportation as appropriate  - Identify discharge learning needs (meds, wound care, etc )  - Arrange for interpretive services to assist at discharge as needed  - Refer to Case Management Department for coordinating discharge planning if the patient needs post-hospital services based on physician/advanced practitioner order or complex needs related to functional status, cognitive ability, or social support system  2/19/2023 2246 by Krystal Sauceda  Outcome: Progressing  2/19/2023 2246 by Krystal Sauceda  Outcome: Progressing     Problem: Knowledge Deficit  Goal: Patient/family/caregiver demonstrates understanding of disease process, treatment plan, medications, and discharge instructions  Description: Complete learning assessment and assess knowledge base    Interventions:  - Provide teaching at level of understanding  - Provide teaching via preferred learning methods  2/19/2023 2246 by Krystal Sauceda  Outcome: Progressing  2/19/2023 2246 by Krystal Sauceda  Outcome: Progressing     Problem: SKIN/TISSUE INTEGRITY - ADULT  Goal: Skin Integrity remains intact(Skin Breakdown Prevention)  Description: Assess:  -Perform Michael assessment   -Clean and moisturize skin   -Inspect skin when repositioning, toileting, and assisting with ADLS  -Assess under medical devices  -Assess extremities for adequate circulation and sensation     Bed Management:  -Have minimal linens on bed & keep smooth, unwrinkled  -Change linens as needed when moist or perspiring  -Avoid sitting or lying in one position for more than 2 hours while in bed  -Keep HOB at [address]:  -Offer bedside commode  -Assess for incontinence   -Use incontinent care products after each incontinent episode     Activity:  -Mobilize patient 3 times a day  -Encourage activity and walks on unit  -Encourage or provide ROM exercises   -Turn and reposition patient every 2 Hours  -Use appropriate equipment to lift or move patient in bed  -Instruct/ Assist with weight shifting every 30min when out of bed in chair  -Consider limitation of chair time 2 hour intervals    Skin Care:  -Avoid use of baby powder, tape, friction and shearing, hot water or constrictive clothing  -Relieve pressure over bony prominences   -Do not massage red bony areas    Next Steps:  -Teach patient strategies to minimize risks   -Consider consults to  interdisciplinary teams   2/19/2023 2246 by Jimenez Rivera  Outcome: Progressing  2/19/2023 2246 by Jimenez Rivera  Outcome: Progressing  Goal: Incision(s), wounds(s) or drain site(s) healing without S/S of infection  Description: INTERVENTIONS  - Assess and document dressing, incision, wound bed, drain sites and surrounding tissue  - Provide patient and family education  - Perform skin care/dressing changes   2/19/2023 2246 by Jimenez Rivera  Outcome: Progressing  2/19/2023 2246 by Jimenez Rivera  Outcome: Progressing  Goal: Pressure injury heals and does not worsen  Description: Interventions:  - Implement low air loss mattress or specialty surface (Criteria met)  - Apply silicone foam dressing  - Instruct/assist with weight shifting every 30 minutes when in chair   - Limit chair time to 2 hour intervals  - Use special pressure reducing interventions    - Apply fecal or urinary incontinence containment device   - Perform passive or active ROM every 30min  - Turn and reposition patient & offload bony prominences every 2 hours   - Utilize friction reducing device or surface for transfers   - Consider consults to  interdisciplinary teams   - Use incontinent care products after each incontinent episode  - Consider nutrition services referral as needed  2/19/2023 2246 by Ralf Estrada  Outcome: Progressing  2/19/2023 2246 by Ralf Estrada  Outcome: Progressing     Problem: Prexisting or High Potential for Compromised Skin Integrity  Goal: Skin integrity is maintained or improved  Description: INTERVENTIONS:  - Identify patients at risk for skin breakdown  - Assess and monitor skin integrity  - Assess and monitor nutrition and hydration status  - Monitor labs   - Assess for incontinence   - Turn and reposition patient  - Assist with mobility/ambulation  - Relieve pressure over bony prominences  - Avoid friction and shearing  - Provide appropriate hygiene as needed including keeping skin clean and dry  - Evaluate need for skin moisturizer/barrier cream  - Collaborate with interdisciplinary team   - Patient/family teaching  - Consider wound care consult   2/19/2023 2246 by Ralf Estrada  Outcome: Progressing  2/19/2023 2246 by Ralf Estrada  Outcome: Progressing     Problem: MOBILITY - ADULT  Goal: Maintain or return to baseline ADL function  Description: INTERVENTIONS:  -  Assess patient's ability to carry out ADLs; assess patient's baseline for ADL function and identify physical deficits which impact ability to perform ADLs (bathing, care of mouth/teeth, toileting, grooming, dressing, etc )  - Assess/evaluate cause of self-care deficits   - Assess range of motion  - Assess patient's mobility; develop plan if impaired  - Assess patient's need for assistive devices and provide as appropriate  - Encourage maximum independence but intervene and supervise when necessary  - Involve family in performance of ADLs  - Assess for home care needs following discharge   - Consider OT consult to assist with ADL evaluation and planning for discharge  - Provide patient education as appropriate  2/19/2023 2246 by Teofilo Rangel  Outcome: Progressing  2/19/2023 2246 by Teofilo Rangel  Outcome: Progressing  Goal: Maintains/Returns to pre admission functional level  Description: INTERVENTIONS:  - Perform BMAT or MOVE assessment daily    - Set and communicate daily mobility goal to care team and patient/family/caregiver  - Collaborate with rehabilitation services on mobility goals if consulted  - Perform Range of Motion 3 times a day  - Reposition patient every 2 hours  - Dangle patient 3 times a day  - Stand patient 3 times a day  - Ambulate patient 3 times a day  - Out of bed to chair 3 times a day   - Out of bed for meals 3 times a day  - Out of bed for toileting  - Record patient progress and toleration of activity level   2/19/2023 2246 by Teofilo Rangel  Outcome: Progressing  2/19/2023 2246 by Teofilo Rangel  Outcome: Progressing     Problem: Nutrition/Hydration-ADULT  Goal: Nutrient/Hydration intake appropriate for improving, restoring or maintaining nutritional needs  Description: Monitor and assess patient's nutrition/hydration status for malnutrition  Collaborate with interdisciplinary team and initiate plan and interventions as ordered  Monitor patient's weight and dietary intake as ordered or per policy  Utilize nutrition screening tool and intervene as necessary  Determine patient's food preferences and provide high-protein, high-caloric foods as appropriate       INTERVENTIONS:  - Monitor oral intake, urinary output, labs, and treatment plans  - Assess nutrition and hydration status and recommend course of action  - Evaluate amount of meals eaten  - Assist patient with eating if necessary   - Allow adequate time for meals  - Recommend/ encourage appropriate diets, oral nutritional supplements, and vitamin/mineral supplements  - Order, calculate, and assess calorie counts as needed  - Recommend, monitor, and adjust tube feedings and TPN/PPN based on assessed needs  - Assess need for intravenous fluids  - Provide specific nutrition/hydration education as appropriate  - Include patient/family/caregiver in decisions related to nutrition  2/19/2023 2246 by Jony Damian  Outcome: Progressing  2/19/2023 2246 by Jony Damian  Outcome: Progressing

## 2023-02-20 NOTE — PLAN OF CARE
Problem: PAIN - ADULT  Goal: Verbalizes/displays adequate comfort level or baseline comfort level  Description: Interventions:  - Encourage patient to monitor pain and request assistance  - Assess pain using appropriate pain scale  - Administer analgesics based on type and severity of pain and evaluate response  - Implement non-pharmacological measures as appropriate and evaluate response  - Consider cultural and social influences on pain and pain management  - Notify physician/advanced practitioner if interventions unsuccessful or patient reports new pain  Outcome: Progressing     Problem: INFECTION - ADULT  Goal: Absence or prevention of progression during hospitalization  Description: INTERVENTIONS:  - Assess and monitor for signs and symptoms of infection  - Monitor lab/diagnostic results  - Monitor all insertion sites, i e  indwelling lines, tubes, and drains  - Monitor endotracheal if appropriate and nasal secretions for changes in amount and color  - [place] appropriate cooling/warming therapies per order  - Administer medications as ordered  - Instruct and encourage patient and family to use good hand hygiene technique  - Identify and instruct in appropriate isolation precautions for identified infection/condition  Outcome: Progressing  Goal: Absence of fever/infection during neutropenic  Description: INTERVENTIONS:  - Monitor WBC    Outcome: Progressing

## 2023-02-20 NOTE — PROGRESS NOTES
Spiritual Care Progress Note    2023  Patient: Catracho Ledesma : 1965  Admission Date & Time: 2023 1501  MRN: [id number] [facility]: [id number]     followed-up with patient from previous encounter   facilitated conversation about patient's understanding of his progress, relational concerns, and understanding of mortality   supported patient in naming emotions, normalized patient's experience, and facilitated cathartic release of emotions via storytelling  Patient identified feelings of grief, anger, and hope for his progress and noted his life would be "permanently changed" and that "cancer may get me in the end " Currently non-observant, patient identifies as Mandaeism  Patient thanked  for providing support and listening  Spiritual care will remain available               Chaplaincy Interventions Utilized:   Empowerment: Encouraged focus on present, Normalized experience of patient/family, and Provided grief counseling    Exploration: Explored hope, Explored emotional needs & resources, Facilitated story telling, and Identified, evaluated & reinforced appropriate coping strategies    Collaboration: Advocated for patient/family and Facilitated respect for spiritual/cultural practice during hospitalization    Relationship Building: Cultivated a relationship of care and support and Listened empathically      Chaplaincy Outcomes Achieved:  Catharsis, Debriefed/defused experience, Emotional resources utilized, Expressed gratitude, Identified meaningful connections, Progressed toward reconciliation, and Tearfully processed emotions      Spiritual Coping Strategies Utilized:   Spiritual meaning     23 1400   Clinical Encounter Type   Visited With Patient   Routine Visit Follow-up   Referral From Departmental follow-up

## 2023-02-20 NOTE — PROGRESS NOTES
Progress Note - Infectious Disease   Judy Cervantes 61 y o  male MRN: [id number]  Unit/Bed#: -01 Encounter: [id number]      Impression/Plan:  1   Systemic inflammatory response syndrome   Leukocytosis and tachycardia   Present on admission   Possible sepsis without a well-defined source   The patient CT chest abdomen and pelvis does not reveal any new source of sepsis   Urinalysis nondiagnostic for UTI   The abdominal wound has been debrided   Unclear significance of the extremely high alkaline phosphatase level with the total bilirubin normal and the remainder of the transaminases not proportionally high   Consideration for the possibility of bacteremia   Right upper quadrant ultrasound without biliary ductal dilatation   Wound culture with ESBL and MRSA   No recurrence of the fever and the white cell count has come down substantially   Patient completed more than a week of treatment for the ESBL and MRSA  Patient is now been stable off all antibiotics for more than 3 days   -No additional antibiotics for now  -Surgery follow-up  -Source control measures as below  -Supportive care     2   Intra-abdominal abscesses   Status post IR drainage with drains remaining in place but with improved findings on repeat CT scan   Patient completed several weeks of intravenous antibiotics   No evidence of a new abscess   -No additional antibiotics for now  -Surgery follow-up  -Serial exams     3   ESBL E  coli bacteremia/abscess formation   Status post prolonged course of intravenous antibiotics with clearance of the bacteremia  Abscess is improved as above  -No directed antibiotics for this problem  -Drain management     4   Abdominal wound   No overt cellulitis or purulence seen although there is some exudate   Possible mild wound infection that is status post debridement by surgery   ESBL and MRSA    Status post more than a week of IV antibiotics  -Local wound care  -Serial abdominal exams  -Surgery follow-up  -Antibiotics as above     5   Metastatic colon cancer   Status post extensive surgical resection with reexploration in the setting of complication     6   Acute kidney injury   Suspect secondary to prerenal issues   No other clear source appreciated   Renal function improving and now stabilized   Renal function waxing and waning but is relatively stable  -Volume management    Discussed the above management plan with the primary service and they agree with the plan  No active infectious disease issues  We will sign off  Please call if questions  Antibiotics:  Status post 10 days of antibiotics  Off antibiotics 4    Subjective:  Patient has no fever, chills, sweats; no nausea, vomiting, diarrhea; no cough, shortness of breath; no pain  No new symptoms  Objective:  Vitals:  Temp:  [96 7 °F (35 9 °C)-99 7 °F (37 6 °C)] 96 7 °F (35 9 °C)  HR:  [61-91] 61  Resp:  [17-18] 18  BP: (127-144)/(69-87) 137/82  SpO2:  [97 %-100 %] 98 %  Temp (24hrs), Av 2 °F (36 8 °C), Min:96 7 °F (35 9 °C), Max:99 7 °F (37 6 °C)  Current: Temperature: (!) 96 7 °F (35 9 °C)    Physical Exam:   General Appearance:  Alert, interactive, nontoxic, no acute distress  Throat: Oropharynx moist without lesions  Lungs:   Clear to auscultation bilaterally; no wheezes, rhonchi or rales; respirations unlabored   Heart:  RRR; no murmur, rub or gallop   Abdomen:   Soft, non-tender, non-distended, positive bowel sounds  Ostomy with output  Right-sided biliary drain in place  VICTORINO drain in place  Midline incision with a dry dressing in place  Extremities: No clubbing, cyanosis or edema   Skin: No new rashes or lesions  No draining wounds noted         Labs, Imaging, & Other studies:   All pertinent labs and imaging studies were personally reviewed  Results from last 7 days   Lab Units 23  1420 23  0521 23  0623 23  0858   WBC Thousand/uL  --  10 23* 12 03* 10 04   HEMOGLOBIN g/dL 8 4* 7 3* 8 3* 7 8* PLATELETS Thousands/uL  --  303 335 341     Results from last 7 days   Lab Units 02/20/23  0521 02/19/23  0623 02/18/23  0858 02/16/23  0530 02/15/23  1412   SODIUM mmol/L 136 135 138   < > 131*   POTASSIUM mmol/L 3 4* 3 7 3 6   < > 4 1   CHLORIDE mmol/L 106 105 107   < > 103   CO2 mmol/L 20* 19* 21   < > 21   BUN mg/dL 12 12 16   < > 22   CREATININE mg/dL 1 38* 1 35* 1 38*   < > 1 21   EGFR ml/min/1 73sq m 55 57 55   < > 65   CALCIUM mg/dL 7 5* 7 7* 7 7*   < > 7 9*   AST U/L  --   --   --   --  31   ALT U/L  --   --   --   --  9   ALK PHOS U/L  --   --   --   --  723*    < > = values in this interval not displayed           Results from last 7 days   Lab Units 02/16/23  0530 02/15/23  1412   PROCALCITONIN ng/ml 0 51* 0 46*

## 2023-02-21 LAB
ANION GAP SERPL CALCULATED.3IONS-SCNC: 9 MMOL/L (ref 4–13)
BUN SERPL-MCNC: 13 MG/DL (ref 5–25)
CALCIUM SERPL-MCNC: 7.5 MG/DL (ref 8.3–10.1)
CHLORIDE SERPL-SCNC: 105 MMOL/L (ref 96–108)
CO2 SERPL-SCNC: 21 MMOL/L (ref 21–32)
CREAT SERPL-MCNC: 1.33 MG/DL (ref 0.6–1.3)
ERYTHROCYTE [DISTWIDTH] IN BLOOD BY AUTOMATED COUNT: 16.6 % (ref 11.6–15.1)
GFR SERPL CREATININE-BSD FRML MDRD: 58 ML/MIN/1.73SQ M
GLUCOSE SERPL-MCNC: 112 MG/DL (ref 65–140)
GLUCOSE SERPL-MCNC: 125 MG/DL (ref 65–140)
GLUCOSE SERPL-MCNC: 147 MG/DL (ref 65–140)
GLUCOSE SERPL-MCNC: 81 MG/DL (ref 65–140)
GLUCOSE SERPL-MCNC: 94 MG/DL (ref 65–140)
HCT VFR BLD AUTO: 24.4 % (ref 36.5–49.3)
HGB BLD-MCNC: 7.5 G/DL (ref 12–17)
LACTATE SERPL-SCNC: 1.3 MMOL/L (ref 0.5–2)
MCH RBC QN AUTO: 25.8 PG (ref 26.8–34.3)
MCHC RBC AUTO-ENTMCNC: 30.7 G/DL (ref 31.4–37.4)
MCV RBC AUTO: 84 FL (ref 82–98)
PLATELET # BLD AUTO: 305 THOUSANDS/UL (ref 149–390)
PMV BLD AUTO: 9.7 FL (ref 8.9–12.7)
POTASSIUM SERPL-SCNC: 3.8 MMOL/L (ref 3.5–5.3)
RBC # BLD AUTO: 2.91 MILLION/UL (ref 3.88–5.62)
SODIUM SERPL-SCNC: 135 MMOL/L (ref 135–147)
WBC # BLD AUTO: 11.26 THOUSAND/UL (ref 4.31–10.16)

## 2023-02-21 RX ADMIN — ASPIRIN 81 MG 81 MG: 81 TABLET ORAL at 09:33

## 2023-02-21 RX ADMIN — INSULIN LISPRO 4 UNITS: 100 INJECTION, SOLUTION INTRAVENOUS; SUBCUTANEOUS at 12:21

## 2023-02-21 RX ADMIN — SIMETHICONE 80 MG: 80 TABLET, CHEWABLE ORAL at 17:34

## 2023-02-21 RX ADMIN — INSULIN LISPRO 4 UNITS: 100 INJECTION, SOLUTION INTRAVENOUS; SUBCUTANEOUS at 17:34

## 2023-02-21 RX ADMIN — INSULIN GLARGINE 5 UNITS: 100 INJECTION, SOLUTION SUBCUTANEOUS at 22:29

## 2023-02-21 RX ADMIN — SIMETHICONE 80 MG: 80 TABLET, CHEWABLE ORAL at 09:37

## 2023-02-21 RX ADMIN — ATORVASTATIN CALCIUM 40 MG: 40 TABLET, FILM COATED ORAL at 09:33

## 2023-02-21 RX ADMIN — SIMETHICONE 80 MG: 80 TABLET, CHEWABLE ORAL at 12:21

## 2023-02-21 RX ADMIN — SIMETHICONE 80 MG: 80 TABLET, CHEWABLE ORAL at 22:29

## 2023-02-21 RX ADMIN — OXYCODONE HYDROCHLORIDE 5 MG: 5 TABLET ORAL at 22:29

## 2023-02-21 RX ADMIN — PANTOPRAZOLE SODIUM 40 MG: 40 TABLET, DELAYED RELEASE ORAL at 17:34

## 2023-02-21 RX ADMIN — TAMSULOSIN HYDROCHLORIDE 0.4 MG: 0.4 CAPSULE ORAL at 17:34

## 2023-02-21 RX ADMIN — AMLODIPINE BESYLATE 2.5 MG: 2.5 TABLET ORAL at 09:33

## 2023-02-21 RX ADMIN — COLLAGENASE SANTYL: 250 OINTMENT TOPICAL at 09:33

## 2023-02-21 RX ADMIN — Medication 6 MG: at 23:34

## 2023-02-21 RX ADMIN — HYDROMORPHONE HYDROCHLORIDE 0.2 MG: 0.2 INJECTION, SOLUTION INTRAMUSCULAR; INTRAVENOUS; SUBCUTANEOUS at 00:23

## 2023-02-21 RX ADMIN — INSULIN LISPRO 4 UNITS: 100 INJECTION, SOLUTION INTRAVENOUS; SUBCUTANEOUS at 09:38

## 2023-02-21 RX ADMIN — ACETAMINOPHEN 650 MG: 325 TABLET, FILM COATED ORAL at 00:28

## 2023-02-21 RX ADMIN — PANTOPRAZOLE SODIUM 40 MG: 40 TABLET, DELAYED RELEASE ORAL at 09:33

## 2023-02-21 RX ADMIN — DULOXETINE HYDROCHLORIDE 30 MG: 30 CAPSULE, DELAYED RELEASE ORAL at 09:33

## 2023-02-21 RX ADMIN — HYDROMORPHONE HYDROCHLORIDE 0.2 MG: 0.2 INJECTION, SOLUTION INTRAMUSCULAR; INTRAVENOUS; SUBCUTANEOUS at 23:34

## 2023-02-21 RX ADMIN — ACETAMINOPHEN 650 MG: 325 TABLET, FILM COATED ORAL at 23:34

## 2023-02-21 RX ADMIN — SODIUM CHLORIDE, PRESERVATIVE FREE 10 ML: 5 INJECTION INTRAVENOUS at 09:39

## 2023-02-21 NOTE — UTILIZATION REVIEW
Continued Stay Review    Date: 2/20                          Current Patient Class: INpatient   Current Level of Care: Med/surg    HPI:57 y o  male initially admitted on 2/7     Assessment/Plan: Has completed iv ertapenem, iv vanco   S/p IR tub check 2/17 , drains kept in place  Continue PT/OT  + drains x 2 and colostomy in place  Case management working on safe dc placement for patient  Vital Signs:   Time Temp Pulse Resp BP MAP (mmHg) SpO2 O2 Device Patient Position - Orthostatic VS   02/20/23 23:11:46 100 7 °F (38 2 °C) Abnormal  95 17 141/83 102 98 % -- --   02/20/23 2049 -- -- -- -- -- 99 % None (Room air) --   02/20/23 1517 98 6 °F (37 °C) 80 18 137/84 99 99 % -- Sitting   02/20/23 1116 -- -- -- -- -- 98 % -- --   02/20/23 07:18:51 96 7 °F (35 9 °C) Abnormal  61 18 137/82 100 100 % --          Pertinent Labs/Diagnostic Results:       Results from last 7 days   Lab Units 02/21/23  0607 02/20/23  1420 02/20/23  0521 02/19/23  0623 02/18/23  0858 02/16/23  2148 02/16/23  0530 02/15/23  1412   WBC Thousand/uL 11 26*  --  10 23* 12 03* 10 04   < > 10 42* 11 69*   HEMOGLOBIN g/dL 7 5* 8 4* 7 3* 8 3* 7 8*   < > 6 8* 7 5*   HEMATOCRIT % 24 4* 27 5* 23 9* 27 0* 25 4*   < > 23 2* 24 6*   PLATELETS Thousands/uL 305  --  303 335 341   < > 345 351   NEUTROS ABS Thousands/µL  --   --   --   --   --   --  7 18 8 81*    < > = values in this interval not displayed           Results from last 7 days   Lab Units 02/21/23  0607 02/20/23  0521 02/19/23  0623 02/18/23  0858 02/17/23  0601   SODIUM mmol/L 135 136 135 138 137   POTASSIUM mmol/L 3 8 3 4* 3 7 3 6 3 8   CHLORIDE mmol/L 105 106 105 107 107   CO2 mmol/L 21 20* 19* 21 21   ANION GAP mmol/L 9 10 11 10 9   BUN mg/dL 13 12 12 16 15   CREATININE mg/dL 1 33* 1 38* 1 35* 1 38* 1 27   EGFR ml/min/1 73sq m 58 55 57 55 61   CALCIUM mg/dL 7 5* 7 5* 7 7* 7 7* 7 5*     Results from last 7 days   Lab Units 02/15/23  1412   AST U/L 31   ALT U/L 9   ALK PHOS U/L 723*   TOTAL PROTEIN g/dL 6 6   ALBUMIN g/dL 2 0*   TOTAL BILIRUBIN mg/dL 0 60     Results from last 7 days   Lab Units 02/21/23  0733 02/20/23  2052 02/20/23  1610 02/20/23  1102 02/20/23  0720 02/19/23  2153 02/19/23  1736 02/19/23  1124 02/19/23  0715 02/18/23  2052 02/18/23  1700 02/18/23  1246   POC GLUCOSE mg/dl 81 163* 167* 200* 110 169* 132 140 119 165* 347* 149*     Results from last 7 days   Lab Units 02/21/23  0607 02/20/23  0521 02/19/23  0623 02/18/23  0858 02/17/23  0601 02/16/23  0530 02/15/23  1412   GLUCOSE RANDOM mg/dL 94 127 101 132 112 104 172*         Results from last 7 days   Lab Units 02/16/23  0530 02/15/23  1412   PROCALCITONIN ng/ml 0 51* 0 46*     Results from last 7 days   Lab Units 02/21/23  1022   LACTIC ACID mmol/L 1 3       Medications:   Scheduled Medications:  amLODIPine, 2 5 mg, Oral, Daily  aspirin, 81 mg, Oral, Daily  atorvastatin, 40 mg, Oral, Daily  collagenase, , Topical, Daily  DULoxetine, 30 mg, Oral, Daily  heparin (porcine), 5,000 Units, Subcutaneous, Q8H SHANNON  insulin glargine, 5 Units, Subcutaneous, HS  insulin lispro, 1-6 Units, Subcutaneous, TID AC  insulin lispro, 1-6 Units, Subcutaneous, HS  insulin lispro, 4 Units, Subcutaneous, TID With Meals  pantoprazole, 40 mg, Oral, BID  simethicone, 80 mg, Oral, 4x Daily (with meals and at bedtime)  sodium chloride (PF), 10 mL, Intravenous, Daily  tamsulosin, 0 4 mg, Oral, Daily With Dinner      Continuous IV Infusions:     PRN Meds:  acetaminophen, 650 mg, Oral, Q4H PRN  HYDROmorphone, 0 2 mg, Intravenous, Q4H PRN  influenza vaccine, 0 5 mL, Intramuscular, Prior to discharge  melatonin, 6 mg, Oral, HS PRN  methocarbamol, 500 mg, Oral, BID PRN  ondansetron, 4 mg, Intravenous, Q4H PRN  oxyCODONE, 5 mg, Oral, Q4H PRN  oxyCODONE, 2 5 mg, Oral, Q4H PRN  pneumococcal 13-valent conjugate vaccine, 0 5 mL, Intramuscular, Prior to discharge        Discharge Plan: TBD    Network Utilization Review Department  ATTENTION: Please call with any questions or concerns to [phone] and carefully listen to the prompts so that you are directed to the right person  All voicemails are confidential   Audrey Martin all requests for admission clinical reviews, approved or denied determinations and any other requests to dedicated fax number below belonging to the campus where the patient is receiving treatment   List of dedicated fax numbers for the Facilities:  [address] DENIALS (Administrative/Medical Necessity) [phone]   [address] (Maternity/NICU/Pediatrics) [phone]    Ericka Pinto [phone]   [facility] 77 [phone]   [address] [place] [address] Shailesh Rosales 28 [phone]   1550 [facility] [place] 134 815 [facility] [phone]

## 2023-02-21 NOTE — PLAN OF CARE
Problem: PAIN - ADULT  Goal: Verbalizes/displays adequate comfort level or baseline comfort level  Description: Interventions:  - Encourage patient to monitor pain and request assistance  - Assess pain using appropriate pain scale  - Administer analgesics based on type and severity of pain and evaluate response  - Implement non-pharmacological measures as appropriate and evaluate response  - Consider cultural and social influences on pain and pain management  - Notify physician/advanced practitioner if interventions unsuccessful or patient reports new pain  Outcome: Progressing     Problem: INFECTION - ADULT  Goal: Absence or prevention of progression during hospitalization  Description: INTERVENTIONS:  - Assess and monitor for signs and symptoms of infection  - Monitor lab/diagnostic results  - Monitor all insertion sites, i e  indwelling lines, tubes, and drains  - Monitor endotracheal if appropriate and nasal secretions for changes in amount and color  - [place] appropriate cooling/warming therapies per order  - Administer medications as ordered  - Instruct and encourage patient and family to use good hand hygiene technique  - Identify and instruct in appropriate isolation precautions for identified infection/condition  Outcome: Progressing  Goal: Absence of fever/infection during neutropenic period  Description: INTERVENTIONS:  - Monitor WBC    Outcome: Progressing     Problem: SAFETY ADULT  Goal: Patient will remain free of falls  Description: INTERVENTIONS:  - Educate patient/family on patient safety including physical limitations  - Instruct patient to call for assistance with activity   - Consult OT/PT to assist with strengthening/mobility   - Keep Call bell within reach  - Keep bed low and locked with side rails adjusted as appropriate  - Keep care items and personal belongings within reach  - Initiate and maintain comfort rounds  - Make Fall Risk Sign visible to staff  - Offer Toileting every  Hours, in advance of need  - Initiate/Maintain alarm  - Obtain necessary fall risk management equipment:   - Apply yellow socks and bracelet for high fall risk patients  - Consider moving patient to room near nurses station  Outcome: Progressing  Goal: Maintain or return to baseline ADL function  Description: INTERVENTIONS:  -  Assess patient's ability to carry out ADLs; assess patient's baseline for ADL function and identify physical deficits which impact ability to perform ADLs (bathing, care of mouth/teeth, toileting, grooming, dressing, etc )  - Assess/evaluate cause of self-care deficits   - Assess range of motion  - Assess patient's mobility; develop plan if impaired  - Assess patient's need for assistive devices and provide as appropriate  - Encourage maximum independence but intervene and supervise when necessary  - Involve family in performance of ADLs  - Assess for home care needs following discharge   - Consider OT consult to assist with ADL evaluation and planning for discharge  - Provide patient education as appropriate  Outcome: Progressing  Goal: Maintains/Returns to pre admission functional level  Description: INTERVENTIONS:  - Perform BMAT or MOVE assessment daily    - Set and communicate daily mobility goal to care team and patient/family/caregiver  - Collaborate with rehabilitation services on mobility goals if consulted  - Perform Range of Motion  times a day  - Reposition patient every hours    - Dangle patient  times a day  - Stand patient  times a day  - Ambulate patient  times a day  - Out of bed to chair  times a day   - Out of bed for meals  times a day  - Out of bed for toileting  - Record patient progress and toleration of activity level   Outcome: Progressing     Problem: DISCHARGE PLANNING  Goal: Discharge to home or other facility with appropriate resources  Description: INTERVENTIONS:  - Identify barriers to discharge w/patient and caregiver  - Arrange for needed discharge resources and transportation as appropriate  - Identify discharge learning needs (meds, wound care, etc )  - Arrange for interpretive services to assist at discharge as needed  - Refer to Case Management Department for coordinating discharge planning if the patient needs post-hospital services based on physician/advanced practitioner order or complex needs related to functional status, cognitive ability, or social support system  Outcome: Progressing     Problem: SKIN/TISSUE INTEGRITY - ADULT  Goal: Skin Integrity remains intact(Skin Breakdown Prevention)  Description: Assess:  -Perform Michael assessment every  -Clean and moisturize skin every   -Inspect skin when repositioning, toileting, and assisting with ADLS  -Assess under medical devices such as  every   -Assess extremities for adequate circulation and sensation     Bed Management:  -Have minimal linens on bed & keep smooth, unwrinkled  -Change linens as needed when moist or perspiring  -Avoid sitting or lying in one position for more than  hours while in bed  -Keep HOB at degrees     Toileting:  -Offer bedside commode  -Assess for incontinence every   -Use incontinent care products after each incontinent episode such as     Activity:  -Mobilize patient  times a day  -Encourage activity and walks on unit  -Encourage or provide ROM exercises   -Turn and reposition patient every  Hours  -Use appropriate equipment to lift or move patient in bed  -Instruct/ Assist with weight shifting every  when out of bed in chair  -Consider limitation of chair time  hour intervals    Skin Care:  -Avoid use of baby powder, tape, friction and shearing, hot water or constrictive clothing  -Relieve pressure over bony prominences using   -Do not massage red bony areas    Next Steps:  -Teach patient strategies to minimize risks such as    -Consider consults to  interdisciplinary teams such as   Outcome: Progressing  Goal: Incision(s), wounds(s) or drain site(s) healing without S/S of infection  Description: INTERVENTIONS  - Assess and document dressing, incision, wound bed, drain sites and surrounding tissue  - Provide patient and family education  - Perform skin care/dressing changes every   Outcome: Progressing  Goal: Pressure injury heals and does not worsen  Description: Interventions:  - Implement low air loss mattress or specialty surface (Criteria met)  - Apply silicone foam dressing  - Instruct/assist with weight shifting every  minutes when in chair   - Limit chair time to  hour intervals  - Use special pressure reducing interventions such as  when in chair   - Apply fecal or urinary incontinence containment device   - Perform passive or active ROM every   - Turn and reposition patient & offload bony prominences every  hours   - Utilize friction reducing device or surface for transfers   - Consider consults to  interdisciplinary teams such as   - Use incontinent care products after each incontinent episode such as   - Consider nutrition services referral as needed  Outcome: Progressing     Problem: Knowledge Deficit  Goal: Patient/family/caregiver demonstrates understanding of disease process, treatment plan, medications, and discharge instructions  Description: Complete learning assessment and assess knowledge base    Interventions:  - Provide teaching at level of understanding  - Provide teaching via preferred learning methods  Outcome: Progressing     Problem: Prexisting or High Potential for Compromised Skin Integrity  Goal: Skin integrity is maintained or improved  Description: INTERVENTIONS:  - Identify patients at risk for skin breakdown  - Assess and monitor skin integrity  - Assess and monitor nutrition and hydration status  - Monitor labs   - Assess for incontinence   - Turn and reposition patient  - Assist with mobility/ambulation  - Relieve pressure over bony prominences  - Avoid friction and shearing  - Provide appropriate hygiene as needed including keeping skin clean and dry  - Evaluate need for skin moisturizer/barrier cream  - Collaborate with interdisciplinary team   - Patient/family teaching  - Consider wound care consult   Outcome: Progressing     Problem: MOBILITY - ADULT  Goal: Maintain or return to baseline ADL function  Description: INTERVENTIONS:  -  Assess patient's ability to carry out ADLs; assess patient's baseline for ADL function and identify physical deficits which impact ability to perform ADLs (bathing, care of mouth/teeth, toileting, grooming, dressing, etc )  - Assess/evaluate cause of self-care deficits   - Assess range of motion  - Assess patient's mobility; develop plan if impaired  - Assess patient's need for assistive devices and provide as appropriate  - Encourage maximum independence but intervene and supervise when necessary  - Involve family in performance of ADLs  - Assess for home care needs following discharge   - Consider OT consult to assist with ADL evaluation and planning for discharge  - Provide patient education as appropriate  Outcome: Progressing  Goal: Maintains/Returns to pre admission functional level  Description: INTERVENTIONS:  - Perform BMAT or MOVE assessment daily    - Set and communicate daily mobility goal to care team and patient/family/caregiver  - Collaborate with rehabilitation services on mobility goals if consulted  - Perform Range of Motion  times a day  - Reposition patient every  hours  - Dangle patient  times a day  - Stand patient  times a day  - Ambulate patient  times a day  - Out of bed to chair  times a day   - Out of bed for meals  times a day  - Out of bed for toileting  - Record patient progress and toleration of activity level   Outcome: Progressing     Problem: Nutrition/Hydration-ADULT  Goal: Nutrient/Hydration intake appropriate for improving, restoring or maintaining nutritional needs  Description: Monitor and assess patient's nutrition/hydration status for malnutrition   Collaborate with interdisciplinary team and initiate plan and interventions as ordered  Monitor patient's weight and dietary intake as ordered or per policy  Utilize nutrition screening tool and intervene as necessary  Determine patient's food preferences and provide high-protein, high-caloric foods as appropriate       INTERVENTIONS:  - Monitor oral intake, urinary output, labs, and treatment plans  - Assess nutrition and hydration status and recommend course of action  - Evaluate amount of meals eaten  - Assist patient with eating if necessary   - Allow adequate time for meals  - Recommend/ encourage appropriate diets, oral nutritional supplements, and vitamin/mineral supplements  - Order, calculate, and assess calorie counts as needed  - Recommend, monitor, and adjust tube feedings and TPN/PPN based on assessed needs  - Assess need for intravenous fluids  - Provide specific nutrition/hydration education as appropriate  - Include patient/family/caregiver in decisions related to nutrition  Outcome: Progressing

## 2023-02-21 NOTE — ASSESSMENT & PLAN NOTE
Lab Results   Component Value Date    EGFR 58 02/21/2023    EGFR 55 02/20/2023    EGFR 57 02/19/2023    CREATININE 1 33 (H) 02/21/2023    CREATININE 1 38 (H) 02/20/2023    CREATININE 1 35 (H) 02/19/2023     · POA with creatinine 2 52; baseline 1-1 3  · Suspecting possible multifactorial cause in setting of dehydration and sepsis   Also likely Vancomycin contributing  · Avoid nephrotoxic agents  · Steadily downtrending

## 2023-02-21 NOTE — CASE MANAGEMENT
Tried to contact Darshana yoo ([phone], [phone], & [phone]) to check status of pt's auth (submitted 2/17)  Was not able to get incontact with a direct  representative, only automated messages  Contacted a rep  Hudson Orosco [phone]) from a previous authorization to try and help  Voicemail left for [employer]  CM notified

## 2023-02-21 NOTE — ASSESSMENT & PLAN NOTE
Noted the development of foul-smelling drainage and increasing pain from his chronic abdominal wound  · As evidenced by tachycardia, tachypnea, and leukocytosis  · In setting of purulent abdominal infection   · Hx of colon CA w/ extensive abd surgical hx as below   · Patient has several abdominal drains in place, with CT showing improvement in the fluid collections at the left liver lobe, perisplenic area, and the left retroperitoneum inferior to the spleen  · Completed IV Ertapenem and IV Vancomycin  · ID consulted; recommendations appreciated   · S/p bedside midline wound debridement + wet dressing 2/8 with general surgery  · S/p IR tube check 2/17/2023, drains kept in place   Discussed with IR, follow up in 1 month  · If pt has recurrent fevers with order blood cultures and repeat CT a/p per ID  · Currently continue to monitor off abx  · Awaiting placement

## 2023-02-21 NOTE — CASE MANAGEMENT
Case Management Discharge Planning Note    Patient name Marta Ahumada  Location /-82 MRN [id number]  : 1965 Date 2023       Current Admission Date: 2023  Current Admission Diagnosis:Sepsis [facility])   Patient Active Problem List    Diagnosis Date Noted   • Nephrolithiasis 2023   • Abnormal CT scan 2023   • Dehiscence of incision 2022   • Elevated alkaline phosphatase level 2022   • Leukocytosis 2022   • Abscess 2022   • Acute pain 2022   • Sepsis ([address] ) 2022   • Severe protein-calorie malnutrition ([address] ) 2022   • Acute on chronic kidney failure ([address] ) 2022   • MR (mitral regurgitation) 2022   • ESBL (extended spectrum beta-lactamase) producing bacteria infection 2022   • Encephalopathy 2022   • Cervical radiculopathy 10/26/2022   • Other fatigue 06/15/2022   • Colostomy prolapse ([address] ) 2022   • Colon cancer metastasized to liver ([facility] ) 2022   • 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 liver ([facility] ) 2022   • Iron deficiency anemia, unspecified 2022   • Malignant neoplasm of transverse colon ([address] ) 2022   • Thrombocytosis 2022   • Hypokalemia 2022   • Transaminitis 2022   • Type 2 diabetes mellitus with hyperlipidemia ([address] ) 2022   • Anemia 2022   • Left ureteral calculus 2020   • Incarcerated umbilical hernia    • Testicular hypogonadism 2017   • Low testosterone 2017   • Type 2 diabetes mellitus without complication, with long-term current use of insulin ([address] ) 2016   • Benign essential hypertension 2016   • Mixed hyperlipidemia 2016   • Erectile dysfunction 2016   • Obesity (BMI 30-39 9) 2016      LOS (days): 14  Geometric Mean LOS (GMLOS) (days):   Days to GMLOS:     OBJECTIVE:  Risk of Unplanned Readmission Score: 47 42         Current admission status: Inpatient   Preferred Pharmacy:   [id number] - [facility] [place]JEL[place]BOS - [address] [facility] [place] [address]  Phone: [phone] Fax: [address] [facility] [address] [address] [address]  Phone: [phone] Fax: 3228 [address] [address]  Phone: [phone] Fax: [phone]    Primary Care Provider: Homer Sierra MD    Primary Insurance: [facility]  Secondary Insurance:     [address] [place] [address] Number: [id number]

## 2023-02-21 NOTE — PLAN OF CARE
Problem: PAIN - ADULT  Goal: Verbalizes/displays adequate comfort level or baseline comfort level  Description: Interventions:  - Encourage patient to monitor pain and request assistance  - Assess pain using appropriate pain scale  - Administer analgesics based on type and severity of pain and evaluate response  - Implement non-pharmacological measures as appropriate and evaluate response  - Consider cultural and social influences on pain and pain management  - Notify physician/advanced practitioner if interventions unsuccessful or patient reports new pain  Outcome: Progressing     Problem: INFECTION - ADULT  Goal: Absence or prevention of progression during hospitalization  Description: INTERVENTIONS:  - Assess and monitor for signs and symptoms of infection  - Monitor lab/diagnostic results  - Monitor all insertion sites, i e  indwelling lines, tubes, and drains  - Monitor endotracheal if appropriate and nasal secretions for changes in amount and color  - Rombauer appropriate cooling/warming therapies per order  - Administer medications as ordered  - Instruct and encourage patient and family to use good hand hygiene technique  - Identify and instruct in appropriate isolation precautions for identified infection/condition  Outcome: Progressing  Goal: Absence of fever/infection during neutropenic period  Description: INTERVENTIONS:  - Monitor WBC    Outcome: Progressing     Problem: SAFETY ADULT  Goal: Patient will remain free of falls  Description: INTERVENTIONS:  - Educate patient/family on patient safety including physical limitations  - Instruct patient to call for assistance with activity   - Consult OT/PT to assist with strengthening/mobility   - Keep Call bell within reach  - Keep bed low and locked with side rails adjusted as appropriate  - Keep care items and personal belongings within reach  - Initiate and maintain comfort rounds  - Make Fall Risk Sign visible to staff  - Offer Toileting every  Hours, in advance of need  - Initiate/Maintain alarm  - Obtain necessary fall risk management equipment:   - Apply yellow socks and bracelet for high fall risk patients  - Consider moving patient to room near nurses station  Outcome: Progressing  Goal: Maintain or return to baseline ADL function  Description: INTERVENTIONS:  -  Assess patient's ability to carry out ADLs; assess patient's baseline for ADL function and identify physical deficits which impact ability to perform ADLs (bathing, care of mouth/teeth, toileting, grooming, dressing, etc )  - Assess/evaluate cause of self-care deficits   - Assess range of motion  - Assess patient's mobility; develop plan if impaired  - Assess patient's need for assistive devices and provide as appropriate  - Encourage maximum independence but intervene and supervise when necessary  - Involve family in performance of ADLs  - Assess for home care needs following discharge   - Consider OT consult to assist with ADL evaluation and planning for discharge  - Provide patient education as appropriate  Outcome: Progressing  Goal: Maintains/Returns to pre admission functional level  Description: INTERVENTIONS:  - Perform BMAT or MOVE assessment daily    - Set and communicate daily mobility goal to care team and patient/family/caregiver  - Collaborate with rehabilitation services on mobility goals if consulted  - Perform Range of Motion  times a day  - Reposition patient every  hours    - Dangle patient  times a day  - Stand patient  times a day  - Ambulate patient  times a day  - Out of bed to chair  times a day   - Out of bed for meals  times a day  - Out of bed for toileting  - Record patient progress and toleration of activity level   Outcome: Progressing     Problem: DISCHARGE PLANNING  Goal: Discharge to home or other facility with appropriate resources  Description: INTERVENTIONS:  - Identify barriers to discharge w/patient and caregiver  - Arrange for needed discharge resources and transportation as appropriate  - Identify discharge learning needs (meds, wound care, etc )  - Arrange for interpretive services to assist at discharge as needed  - Refer to Case Management Department for coordinating discharge planning if the patient needs post-hospital services based on physician/advanced practitioner order or complex needs related to functional status, cognitive ability, or social support system  Outcome: Progressing     Problem: Knowledge Deficit  Goal: Patient/family/caregiver demonstrates understanding of disease process, treatment plan, medications, and discharge instructions  Description: Complete learning assessment and assess knowledge base    Interventions:  - Provide teaching at level of understanding  - Provide teaching via preferred learning methods  Outcome: Progressing     Problem: SKIN/TISSUE INTEGRITY - ADULT  Goal: Skin Integrity remains intact(Skin Breakdown Prevention)  Description: Assess:  -Perform Michael assessment every   -Clean and moisturize skin every   -Inspect skin when repositioning, toileting, and assisting with ADLS  -Assess under medical devices such as  every   -Assess extremities for adequate circulation and sensation     Bed Management:  -Have minimal linens on bed & keep smooth, unwrinkled  -Change linens as needed when moist or perspiring  -Avoid sitting or lying in one position for more than  hours while in bed  -Keep HOB at degrees     Toileting:  -Offer bedside commode  -Assess for incontinence every   -Use incontinent care products after each incontinent episode such as     Activity:  -Mobilize patient  times a day  -Encourage activity and walks on unit  -Encourage or provide ROM exercises   -Turn and reposition patient every  Hours  -Use appropriate equipment to lift or move patient in bed  -Instruct/ Assist with weight shifting every  when out of bed in chair  -Consider limitation of chair time  hour intervals    Skin Care:  -Avoid use of baby powder, tape, friction and shearing, hot water or constrictive clothing  -Relieve pressure over bony prominences using   -Do not massage red bony areas    Next Steps:  -Teach patient strategies to minimize risks such as    -Consider consults to  interdisciplinary teams such as   Outcome: Progressing  Goal: Incision(s), wounds(s) or drain site(s) healing without S/S of infection  Description: INTERVENTIONS  - Assess and document dressing, incision, wound bed, drain sites and surrounding tissue  - Provide patient and family education  - Perform skin care/dressing changes every   Outcome: Progressing  Goal: Pressure injury heals and does not worsen  Description: Interventions:  - Implement low air loss mattress or specialty surface (Criteria met)  - Apply silicone foam dressing  - Instruct/assist with weight shifting every  minutes when in chair   - Limit chair time to  hour intervals  - Use special pressure reducing interventions such as  when in chair   - Apply fecal or urinary incontinence containment device   - Perform passive or active ROM every   - Turn and reposition patient & offload bony prominences every  hours   - Utilize friction reducing device or surface for transfers   - Consider consults to  interdisciplinary teams such as   - Use incontinent care products after each incontinent episode such as   - Consider nutrition services referral as needed  Outcome: Progressing     Problem: Prexisting or High Potential for Compromised Skin Integrity  Goal: Skin integrity is maintained or improved  Description: INTERVENTIONS:  - Identify patients at risk for skin breakdown  - Assess and monitor skin integrity  - Assess and monitor nutrition and hydration status  - Monitor labs   - Assess for incontinence   - Turn and reposition patient  - Assist with mobility/ambulation  - Relieve pressure over bony prominences  - Avoid friction and shearing  - Provide appropriate hygiene as needed including keeping skin clean and dry  - Evaluate need for skin moisturizer/barrier cream  - Collaborate with interdisciplinary team   - Patient/family teaching  - Consider wound care consult   Outcome: Progressing     Problem: MOBILITY - ADULT  Goal: Maintain or return to baseline ADL function  Description: INTERVENTIONS:  -  Assess patient's ability to carry out ADLs; assess patient's baseline for ADL function and identify physical deficits which impact ability to perform ADLs (bathing, care of mouth/teeth, toileting, grooming, dressing, etc )  - Assess/evaluate cause of self-care deficits   - Assess range of motion  - Assess patient's mobility; develop plan if impaired  - Assess patient's need for assistive devices and provide as appropriate  - Encourage maximum independence but intervene and supervise when necessary  - Involve family in performance of ADLs  - Assess for home care needs following discharge   - Consider OT consult to assist with ADL evaluation and planning for discharge  - Provide patient education as appropriate  Outcome: Progressing  Goal: Maintains/Returns to pre admission functional level  Description: INTERVENTIONS:  - Perform BMAT or MOVE assessment daily    - Set and communicate daily mobility goal to care team and patient/family/caregiver  - Collaborate with rehabilitation services on mobility goals if consulted  - Perform Range of Motion  times a day  - Reposition patient every  hours  - Dangle patient  times a day  - Stand patient  times a day  - Ambulate patient  times a day  - Out of bed to chair  times a day   - Out of bed for meals  times a day  - Out of bed for toileting  - Record patient progress and toleration of activity level   Outcome: Progressing     Problem: Nutrition/Hydration-ADULT  Goal: Nutrient/Hydration intake appropriate for improving, restoring or maintaining nutritional needs  Description: Monitor and assess patient's nutrition/hydration status for malnutrition   Collaborate with interdisciplinary team and initiate plan and interventions as ordered  Monitor patient's weight and dietary intake as ordered or per policy  Utilize nutrition screening tool and intervene as necessary  Determine patient's food preferences and provide high-protein, high-caloric foods as appropriate       INTERVENTIONS:  - Monitor oral intake, urinary output, labs, and treatment plans  - Assess nutrition and hydration status and recommend course of action  - Evaluate amount of meals eaten  - Assist patient with eating if necessary   - Allow adequate time for meals  - Recommend/ encourage appropriate diets, oral nutritional supplements, and vitamin/mineral supplements  - Order, calculate, and assess calorie counts as needed  - Recommend, monitor, and adjust tube feedings and TPN/PPN based on assessed needs  - Assess need for intravenous fluids  - Provide specific nutrition/hydration education as appropriate  - Include patient/family/caregiver in decisions related to nutrition  Outcome: Progressing

## 2023-02-21 NOTE — ASSESSMENT & PLAN NOTE
Recent Labs     02/20/23  0521 02/20/23  1420 02/21/23  0607   HGB 7 3* 8 4* 7 5*     · 2/16 received BT 1u PRBC  · No overt bleeding noted  · Monitor CBC, transfuse as needed  · Repeat hemoglobin this afternoon stable

## 2023-02-21 NOTE — ASSESSMENT & PLAN NOTE
Follows w/ outpatient [facility] onc and palliative care   Extensive abdominal surgical history (11/7 ex lap w/ section 3 liver resection, and development of left upper quadrant hematoma and suspected leak from transverse colon anastomosis complication; 54/57 right hemicolectomy; 11/17 re-exploration w/partial descending colectomy; 12/8 IR percutaneous cholecystostomy tube and hepatectomy abscess drainage; 12/20 IR drains placed for perisplenic abscess)  · Patient does not wish [place] [facility] [place] as it [place] too [place] of a drive; would like to establish with [facility] oncology (already follows with Dr Sanjiv Cedeno)  · Continue outpatient f/u

## 2023-02-21 NOTE — PROGRESS NOTES
[address] [facility], [place]  Progress Note - Severo Herr 1965, 61 y o  male MRN: [id number]  Unit/Bed#: -01 Encounter: [id number]  Primary Care Provider: Aliyah Mccoy MD   Date and time admitted to hospital: 2/7/2023  3:01 PM    Nephrolithiasis  Lungodora Arnold 148 revealed 7 mm nonobstructing right intrarenal calculus  No hydronephrosis noted on CT a/p  Asymptomatic at this time  · Flomax  · Intake and output    Abnormal CT scan  Assessment & Plan  CT shows interval decrease in bilateral pleural effusions, however multiple small bilateral lung nodules are seen which are concerning for possible metastatic lung disease  · Patient reports having prior known lung nodules  · Oxygenating appropriately on room air; continue to monitor oxygenation  · Outpatient f/u    Elevated alkaline phosphatase level  Assessment & Plan  Chronically elevated likely in setting of known hepatic metastasis  · RUQ US Decompressed gallbladder around a cholecystostomy tube, limiting evaluation  Hepatomegaly  Heterogeneous echotexture of the liver in this patient with known metastatic disease  7 mm nonobstructing right intrarenal calculus  · Monitor with am CMP    Acute on chronic kidney failure [facility])  Assessment & Plan  Lab Results   Component Value Date    EGFR 58 02/21/2023    EGFR 55 02/20/2023    EGFR 57 02/19/2023    CREATININE 1 33 (H) 02/21/2023    CREATININE 1 38 (H) 02/20/2023    CREATININE 1 35 (H) 02/19/2023     · POA with creatinine 2 52; baseline 1-1 3  · Suspecting possible multifactorial cause in setting of dehydration and sepsis  Also likely Vancomycin contributing  · Avoid nephrotoxic agents  · Steadily downtrending    Colon cancer metastasized to liver [facility])  Assessment & Plan  Follows w/ outpatient [facility] onc and palliative care   Extensive abdominal surgical history (11/7 ex lap w/ section 3 liver resection, and development of left upper quadrant hematoma and suspected leak from transverse colon anastomosis complication; 59/89 right hemicolectomy; 11/17 re-exploration w/partial descending colectomy; 12/8 IR percutaneous cholecystostomy tube and hepatectomy abscess drainage; 12/20 IR drains placed for perisplenic abscess)  · Patient does not wish to follow with [place] oncology anymore as it is too far of a drive; would like to establish with [facility] oncology (already follows with Dr Florentino Morataya)  · Continue outpatient f/u    Anemia  Assessment & Plan  Recent Labs     02/20/23  0521 02/20/23  1420 02/21/23  0607   HGB 7 3* 8 4* 7 5*     · 2/16 received BT 1u PRBC  · No overt bleeding noted  · Monitor CBC, transfuse as needed  · Repeat hemoglobin this afternoon stable    Benign essential hypertension  Assessment & Plan  BP stable  · Continue prehospital HTN medications  · Monitor BP per unit protocol    Type 2 diabetes mellitus without complication, with long-term current use of insulin (HCC)  Assessment & Plan    Lab Results   Component Value Date    HGBA1C 8 0 (H) 09/26/2022     · Home Lantus 8U qHS and humalog 4U TID w/meals   · Dose reduce to 5U qhs + 4 U TID   · 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  · Hypoglycemia protocol  · Diabetic diet     * Sepsis ([facility] [address] )  Assessment & Plan  Noted the development of foul-smelling drainage and increasing pain from his chronic abdominal wound  · As evidenced by tachycardia, tachypnea, and leukocytosis  · In setting of purulent abdominal infection   · Hx of colon CA w/ extensive abd surgical hx as below   · Patient has several abdominal drains in place, with CT showing improvement in the fluid collections at the left liver lobe, perisplenic area, and the left retroperitoneum inferior to the spleen  · Completed IV Ertapenem and IV Vancomycin  · ID consulted; recommendations appreciated   · S/p bedside midline wound debridement + wet dressing 2/8 with general surgery  · S/p IR tube check 2/17/2023, drains kept in place   Discussed with IR, follow up in 1 month  · If pt has recurrent fevers with order blood cultures and repeat CT a/p per ID  · Currently continue to monitor off abx  · Awaiting placement          VTE Pharmacologic Prophylaxis: VTE Score: 6 Moderate Risk (Score 3-4) - Pharmacological DVT Prophylaxis Ordered: heparin  Patient Centered Rounds: I performed bedside rounds with nursing staff today  Discussions with Specialists or Other Care Team Provider: JIMENA    Education and Discussions with Family / Patient: Patient declined call to   Total Time Spent on Date of Encounter in care of patient: 55 minutes This time was spent on one or more of the following: performing physical exam; counseling and coordination of care; obtaining or reviewing history; documenting in the medical record; reviewing/ordering tests, medications or procedures; communicating with other healthcare professionals and discussing with patient's family/caregivers  Current Length of Stay: 14 day(s)  Current Patient Status: Inpatient   Certification Statement: The patient will continue to require additional inpatient hospital stay due to rehab placement  Discharge Plan: Anticipate discharge later today or tomorrow to rehab facility  Code Status: Level 1 - Full Code    Subjective:   Pt has no acute complaints  Denies fevers or chills overnight  Where he had a temperature but did not feel like he had a fever  Denies nausea, vomiting, abdominal pain, weakness, headache, dizziness  Review systems negative  Patient states he continues to feel better every day, feeling much better compared to last week  Objective:     Vitals:   Temp (24hrs), Av 8 °F (37 1 °C), Min:96 2 °F (35 7 °C), Max:100 7 °F (38 2 °C)    Temp:  [96 2 °F (35 7 °C)-100 7 °F (38 2 °C)] 96 2 °F (35 7 °C)  HR:  [67-95] 67  Resp:  [17-18] 17  BP: (136-141)/(83-84) 136/84  SpO2:  [97 %-99 %] 98 %  Body mass index is 27 98 kg/m²  Input and Output Summary (last 24 hours):      Intake/Output Summary (Last 24 hours) at 2/21/2023 1257  Last data filed at 2/21/2023 1001  Gross per 24 hour   Intake 210 ml   Output 1795 ml   Net -1585 ml       Physical Exam:   Physical Exam  Vitals reviewed  Constitutional:       General: He is not in acute distress  Appearance: He is well-developed  He is not diaphoretic  HENT:      Head: Normocephalic and atraumatic  Mouth/Throat:      Pharynx: No oropharyngeal exudate  Eyes:      General: No scleral icterus  Extraocular Movements: Extraocular movements intact  Conjunctiva/sclera: Conjunctivae normal    Neck:      Vascular: No JVD  Trachea: No tracheal deviation  Cardiovascular:      Rate and Rhythm: Normal rate and regular rhythm  Heart sounds: No murmur heard  No friction rub  No gallop  Pulmonary:      Effort: Pulmonary effort is normal  No respiratory distress  Breath sounds: No stridor  No wheezing  Abdominal:      General: There is no distension  Palpations: Abdomen is soft  There is no mass  Tenderness: There is no abdominal tenderness  There is no right CVA tenderness or left CVA tenderness  Musculoskeletal:         General: No tenderness  Normal range of motion  Right lower leg: No edema  Left lower leg: No edema  Skin:     General: Skin is warm and dry  Coloration: Skin is pale  Findings: No erythema  Neurological:      Mental Status: He is alert and oriented to person, place, and time  Psychiatric:         Behavior: Behavior normal          Thought Content: Thought content normal           Additional Data:     Labs:  Results from last 7 days   Lab Units 02/21/23  0607 02/16/23  2148 02/16/23  0530   WBC Thousand/uL 11 26*   < > 10 42*   HEMOGLOBIN g/dL 7 5*   < > 6 8*   HEMATOCRIT % 24 4*   < > 23 2*   PLATELETS Thousands/uL 305   < > 345   NEUTROS PCT %  --   --  67   LYMPHS PCT %  --   --  18   MONOS PCT %  --   --  12   EOS PCT %  --   --  0    < > = values in this interval not displayed  Results from last 7 days   Lab Units 02/21/23  0607 02/16/23  0530 02/15/23  1412   SODIUM mmol/L 135   < > 131*   POTASSIUM mmol/L 3 8   < > 4 1   CHLORIDE mmol/L 105   < > 103   CO2 mmol/L 21   < > 21   BUN mg/dL 13   < > 22   CREATININE mg/dL 1 33*   < > 1 21   ANION GAP mmol/L 9   < > 7   CALCIUM mg/dL 7 5*   < > 7 9*   ALBUMIN g/dL  --   --  2 0*   TOTAL BILIRUBIN mg/dL  --   --  0 60   ALK PHOS U/L  --   --  723*   ALT U/L  --   --  9   AST U/L  --   --  31   GLUCOSE RANDOM mg/dL 94   < > 172*    < > = values in this interval not displayed  Results from last 7 days   Lab Units 02/21/23  1123 02/21/23  0733 02/20/23  2052 02/20/23  1610 02/20/23  1102 02/20/23  0720 02/19/23  2153 02/19/23  1736 02/19/23  1124 02/19/23  0715 02/18/23  2052 02/18/23  1700   POC GLUCOSE mg/dl 112 81 163* 167* 200* 110 169* 132 140 119 165* 347*         Results from last 7 days   Lab Units 02/21/23  1022 02/16/23  0530 02/15/23  1412   LACTIC ACID mmol/L 1 3  --   --    PROCALCITONIN ng/ml  --  0 51* 0 46*       Lines/Drains:  Invasive Devices     Central Venous Catheter Line  Duration           Port A Cath 03/10/22 Right Chest 348 days          Drain  Duration           Ileostomy LUQ 95 days    Cholecystostomy Tube 70 days    Abscess Drain Abdomen 39 days                Central Line:  Goal for removal: N/A - Chronic PICC             Imaging: No pertinent imaging reviewed      Recent Cultures (last 7 days):         Last 24 Hours Medication List:   Current Facility-Administered Medications   Medication Dose Route Frequency Provider Last Rate   • acetaminophen  650 mg Oral Q4H PRN Nahum Pepe PA-C     • amLODIPine  2 5 mg Oral Daily Nahum [place], [place]     • aspirin  81 mg Oral Daily Nahum [place], [place]     • atorvastatin  40 mg Oral Daily Yuliya Navarro [place], [place]     • collagenase   Topical Daily Annabellevirginia Lopez PA-C     • DULoxetine  30 mg Oral Daily Yuliya Navarro [place] daniel, NANETTE     • heparin (porcine)  5,000 Units Subcutaneous [facility] Jen Marina [place] NANETTE sanchez     • HYDROmorphone  0 2 mg Intravenous Q4H PRN Nelia Kenzie Saraiya, DO     • influenza vaccine  0 5 mL Intramuscular Prior to discharge Jabari Tucker MD     • insulin glargine  5 Units Subcutaneous HS Kiara Wang PA-C     • insulin lispro  1-6 Units Subcutaneous TID AC Jen sanchez PA-C     • insulin lispro  1-6 Units Subcutaneous HS Valerio Currie PA-C     • insulin lispro  4 Units Subcutaneous TID With Meals Valerio Currie PA-C     • melatonin  6 mg Oral HS PRN Snehal Fuentes PA-C     • methocarbamol  500 mg Oral BID PRN Valerio Currie PA-C     • ondansetron  4 mg Intravenous Q4H PRN Nelia Kenzie Saraiya, DO     • oxyCODONE  5 mg Oral Q4H PRN Nelia Kenzie Saraiya, DO     • oxyCODONE  2 5 mg Oral Q4H PRN Nelia Carrie [facility] Saraiya, DO     • pantoprazole  40 mg Oral BID Valerio Currie PA-C     • pneumococcal 13-valent conjugate vaccine  0 5 mL Intramuscular Prior to discharge Jabari Tucker MD     • simethicone  80 mg Oral 4x Daily (with meals and at bedtime) Valerio Currie PA-C     • sodium chloride (PF)  10 mL Intravenous Daily Nelia Kenzie Saraiya, DO     • tamsulosin  0 4 mg Oral Daily [facility] [address] [facility]          Today, Patient Was Seen By: Robert Tomas DO    **Please Note: This note may have been constructed using a voice recognition system  **

## 2023-02-21 NOTE — PROGRESS NOTES
Progress Note - Infectious Disease   Madison Andrea 61 y o  male MRN: [id number]  Unit/Bed#: -01 Encounter: [id number]      Impression/Plan:  1   Systemic inflammatory response syndrome   Leukocytosis and tachycardia   Present on admission   Possible sepsis without a well-defined source   The patient CT chest abdomen and pelvis does not reveal any new source of sepsis   Urinalysis nondiagnostic for UTI   The abdominal wound has been debrided   Unclear significance of the extremely high alkaline phosphatase level with the total bilirubin normal and the remainder of the transaminases not proportionally high   Consideration for the possibility of bacteremia   Right upper quadrant ultrasound without biliary ductal dilatation   Wound culture with ESBL and MRSA   No recurrence of the fever and the white cell count has come down substantially   Patient completed more than a week of treatment for the ESBL and MRSA  Patient is now been stable off all antibiotics for more than 4 days  He is now noted to have a low-grade fever yesterday   -No additional antibiotics for now  -Surgery follow-up  -Source control measures as below  -Supportive care  -If fever recurs, would repeat blood cultures x2 sets and have low threshold to repeat CT abdomen pelvis     2   Intra-abdominal abscesses   Status post IR drainage with drains remaining in place but with improved findings on repeat CT scan   Patient completed several weeks of intravenous antibiotics   No evidence of a new abscess   -No additional antibiotics for now  -Surgery follow-up  -Serial exams     3   ESBL E  coli bacteremia/abscess formation   Status post prolonged course of intravenous antibiotics with clearance of the bacteremia   Abscess is improved as above  -No directed antibiotics for this problem  -Drain management     4   Abdominal wound   No overt cellulitis or purulence seen although there is some exudate   Possible mild wound infection that is status post debridement by surgery   ESBL and MRSA  Status post more than a week of IV antibiotics  -Local wound care  -Serial abdominal exams  -Surgery follow-up  -Antibiotics as above     5   Metastatic colon cancer   Status post extensive surgical resection with reexploration in the setting of complication     6   Acute kidney injury   Suspect secondary to prerenal issues   No other clear source appreciated   Renal function improving and now stabilized   Renal function waxing and waning but is relatively stable  -Volume management    Discussed the above management plan with the primary service and they agree with the plan    Antibiotics:  Status post post 10 days of antibiotics  Off antibiotics 5    Subjective:  Patient with low-grade fever yesterday; no nausea, vomiting, diarrhea; no cough, shortness of breath; no pain  No new symptoms  Objective:  Vitals:  Temp:  [97 8 °F (36 6 °C)-100 7 °F (38 2 °C)] 97 8 °F (36 6 °C)  HR:  [80-95] 81  Resp:  [17-18] 17  BP: (137-141)/(83-84) 139/84  SpO2:  [97 %-99 %] 97 %  Temp (24hrs), Av 4 °F (37 4 °C), Min:97 8 °F (36 6 °C), Max:100 7 °F (38 2 °C)  Current: Temperature: 97 8 °F (36 6 °C)    Physical Exam:   General Appearance:  Alert, interactive, nontoxic, no acute distress  Throat: Oropharynx moist without lesions  Lungs:   Clear to auscultation bilaterally; no wheezes, rhonchi or rales; respirations unlabored   Heart:  RRR; no murmur, rub or gallop   Abdomen:   Soft, non-tender, non-distended, positive bowel sounds  Ostomy with output  Right-sided biliary drain in place  VICTORINO drain in place  Midline wound dressed with a dry dressing in place  Extremities: No clubbing, cyanosis or edema   Skin: No new rashes or lesions  No draining wounds noted         Labs, Imaging, & Other studies:   All pertinent labs and imaging studies were personally reviewed  Results from last 7 days   Lab Units 23  0607 23  1420 23  0521 23  0623   WBC Thousand/uL 11 26*  --  10 23* 12 03*   HEMOGLOBIN g/dL 7 5* 8 4* 7 3* 8 3*   PLATELETS Thousands/uL 305  --  303 335     Results from last 7 days   Lab Units 02/21/23  0607 02/20/23  0521 02/19/23  0623 02/16/23  0530 02/15/23  1412   SODIUM mmol/L 135 136 135   < > 131*   POTASSIUM mmol/L 3 8 3 4* 3 7   < > 4 1   CHLORIDE mmol/L 105 106 105   < > 103   CO2 mmol/L 21 20* 19*   < > 21   BUN mg/dL 13 12 12   < > 22   CREATININE mg/dL 1 33* 1 38* 1 35*   < > 1 21   EGFR ml/min/1 73sq m 58 55 57   < > 65   CALCIUM mg/dL 7 5* 7 5* 7 7*   < > 7 9*   AST U/L  --   --   --   --  31   ALT U/L  --   --   --   --  9   ALK PHOS U/L  --   --   --   --  723*    < > = values in this interval not displayed           Results from last 7 days   Lab Units 02/16/23  0530 02/15/23  1412   PROCALCITONIN ng/ml 0 51* 0 46*

## 2023-02-21 NOTE — CASE MANAGEMENT
Case Management Discharge Planning Note    Patient name Para Hector  Location /-31 MRN [id number]  : 1965 Date 2023       Current Admission Date: 2023  Current Admission Diagnosis:Sepsis [facility])   Patient Active Problem List    Diagnosis Date Noted   • Nephrolithiasis 2023   • Abnormal CT scan 2023   • Dehiscence of incision 2022   • Elevated alkaline phosphatase level 2022   • Leukocytosis 2022   • Abscess 2022   • Acute pain 2022   • Sepsis ([facility] [address] ) 2022   • Severe protein-calorie malnutrition ([facility] [address] ) 2022   • Acute on chronic kidney failure ([facility] [address] ) 2022   • MR (mitral regurgitation) 2022   • ESBL (extended spectrum beta-lactamase) producing bacteria infection 2022   • Encephalopathy 2022   • Cervical radiculopathy 10/26/2022   • Other fatigue 06/15/2022   • Colostomy prolapse ([facility] [address] ) 2022   • Colon cancer metastasized to liver ([facility] [address] ) 2022   • 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 liver ([facility] [address] ) 2022   • Iron deficiency anemia, unspecified 2022   • Malignant neoplasm of transverse colon ([facility] [address] ) 2022   • Thrombocytosis 2022   • Hypokalemia 2022   • Transaminitis 2022   • Type 2 diabetes mellitus with hyperlipidemia ([address] ) 2022   • Anemia 2022   • Left ureteral calculus 2020   • Incarcerated umbilical hernia    • Testicular hypogonadism 2017   • Low testosterone 2017   • Type 2 diabetes mellitus without complication, with long-term current use of insulin ([facility] [address] ) 2016   • Benign essential hypertension 2016   • Mixed hyperlipidemia 2016   • Erectile dysfunction 2016   • Obesity (BMI 30-39 9) 2016      LOS (days): 14  Geometric Mean LOS (GMLOS) (days):   Days to GMLOS:     OBJECTIVE:  Risk of Unplanned Readmission Score: 47 5      Current admission status: Inpatient   Preferred Pharmacy:   [facility]/pharmacy #[id number] - [place], [place] - [address] [facility] [place] [address]  Phone: [phone] Fax: [address] [place] [address] [place] [facility] [address] [address]  Phone: [phone] Fax: [address] [address]  Phone: [phone] Fax: [phone]    Primary Care Provider: Ahsan Angel MD    Primary Insurance: [employer]  Secondary Insurance:     DISCHARGE DETAILS:    Accepting Facility Name, [facility] : [address], [facility], [address] [place] obtained  1:30p transport requested via [employer]   (BLS)  PCS in unit binder  CM will update all parties once confirmed

## 2023-02-21 NOTE — CASE MANAGEMENT
Gama Boone has received approved authorization from insurance:     Faxed in by Ins       Authorization received for: Acute Rehab  Facility: [facility]   Authorization #: [id number]  Start of Care: 02/21/2023  Next Review Date: 02/28/2023  Submit next review to: Fax  [phone]  Care Manager notified: Brenton Mendoza

## 2023-02-21 NOTE — CASE MANAGEMENT
Case Management Progress Note    Patient name Judy Cervantes  Location MS Graham/-70 MRN [id number]  : 1965 Date 2023       LOS (days): 14  Geometric Mean LOS (GMLOS) (days):   Days to GMLOS:        OBJECTIVE:        Current admission status: Inpatient  Preferred Pharmacy:   [id number]- [facility] [place] - [address] [facility] [place] [address]  Phone: [phone] Fax: [address] [place] [address] [place] [facility] [place] [address] [address] [facility] [address] [address]  Phone: [phone] Fax: [address] [place] [address]  Phone: [phone] Fax: [phone]    Primary Care Provider: Zan Arenas MD    Primary Insurance: [employer]  Secondary Insurance:     PROGRESS NOTE:    CM called regarding authorization escalation  Call made to [facility] regarding pending Reference [id number]  Adelia Trevino stating that she is unable to location patient using name, , SSN, or via the pending reference #  Adelia Trevino stating that CM should call [phone] to request to build a case via medical management line  CM called at [phone] and s/w Emily Graham stating that the auth # was actually [id number] and that authorization was approved  CM asked for the authorization information verbally, Emily Graham stating that she is unable to give the authorization information verbally and would have to fax to this CM  CM requested information faxed to [phone] and to [phone]  Lata bella  ARC made aware  CM waiting for fax with authorization information  CM texted [address] regarding follow-up to obtain authorization information  Primary CM to follow      GIACOMO De La Torre, RIZWANW, ACGEM, [facility]  23 2:58 PM

## 2023-02-22 ENCOUNTER — TELEPHONE (OUTPATIENT)
Dept: HEMATOLOGY ONCOLOGY | Facility: CLINIC | Age: 58
End: 2023-02-22

## 2023-02-22 ENCOUNTER — APPOINTMENT (INPATIENT)
Dept: CT IMAGING | Facility: HOSPITAL | Age: 58
End: 2023-02-22

## 2023-02-22 LAB
ANION GAP SERPL CALCULATED.3IONS-SCNC: 10 MMOL/L (ref 4–13)
BUN SERPL-MCNC: 15 MG/DL (ref 5–25)
CALCIUM SERPL-MCNC: 7.7 MG/DL (ref 8.3–10.1)
CHLORIDE SERPL-SCNC: 102 MMOL/L (ref 96–108)
CO2 SERPL-SCNC: 20 MMOL/L (ref 21–32)
CREAT SERPL-MCNC: 1.45 MG/DL (ref 0.6–1.3)
ERYTHROCYTE [DISTWIDTH] IN BLOOD BY AUTOMATED COUNT: 16.8 % (ref 11.6–15.1)
GFR SERPL CREATININE-BSD FRML MDRD: 52 ML/MIN/1.73SQ M
GLUCOSE SERPL-MCNC: 120 MG/DL (ref 65–140)
GLUCOSE SERPL-MCNC: 122 MG/DL (ref 65–140)
GLUCOSE SERPL-MCNC: 188 MG/DL (ref 65–140)
GLUCOSE SERPL-MCNC: 194 MG/DL (ref 65–140)
GLUCOSE SERPL-MCNC: 199 MG/DL (ref 65–140)
HCT VFR BLD AUTO: 25.6 % (ref 36.5–49.3)
HGB BLD-MCNC: 8 G/DL (ref 12–17)
MCH RBC QN AUTO: 25.7 PG (ref 26.8–34.3)
MCHC RBC AUTO-ENTMCNC: 31.3 G/DL (ref 31.4–37.4)
MCV RBC AUTO: 82 FL (ref 82–98)
PLATELET # BLD AUTO: 362 THOUSANDS/UL (ref 149–390)
PMV BLD AUTO: 10.3 FL (ref 8.9–12.7)
POTASSIUM SERPL-SCNC: 3.8 MMOL/L (ref 3.5–5.3)
RBC # BLD AUTO: 3.11 MILLION/UL (ref 3.88–5.62)
SODIUM SERPL-SCNC: 132 MMOL/L (ref 135–147)
WBC # BLD AUTO: 15.26 THOUSAND/UL (ref 4.31–10.16)

## 2023-02-22 RX ORDER — SODIUM CHLORIDE, SODIUM GLUCONATE, SODIUM ACETATE, POTASSIUM CHLORIDE, MAGNESIUM CHLORIDE, SODIUM PHOSPHATE, DIBASIC, AND POTASSIUM PHOSPHATE .53; .5; .37; .037; .03; .012; .00082 G/100ML; G/100ML; G/100ML; G/100ML; G/100ML; G/100ML; G/100ML
100 INJECTION, SOLUTION INTRAVENOUS CONTINUOUS
Status: DISPENSED | OUTPATIENT
Start: 2023-02-22 | End: 2023-02-24

## 2023-02-22 RX ORDER — HYDROMORPHONE HCL IN WATER/PF 6 MG/30 ML
0.2 PATIENT CONTROLLED ANALGESIA SYRINGE INTRAVENOUS ONCE
Status: COMPLETED | OUTPATIENT
Start: 2023-02-22 | End: 2023-02-22

## 2023-02-22 RX ADMIN — INSULIN LISPRO 2 UNITS: 100 INJECTION, SOLUTION INTRAVENOUS; SUBCUTANEOUS at 21:07

## 2023-02-22 RX ADMIN — SODIUM CHLORIDE, SODIUM GLUCONATE, SODIUM ACETATE, POTASSIUM CHLORIDE, MAGNESIUM CHLORIDE, SODIUM PHOSPHATE, DIBASIC, AND POTASSIUM PHOSPHATE 100 ML/HR: .53; .5; .37; .037; .03; .012; .00082 INJECTION, SOLUTION INTRAVENOUS at 12:56

## 2023-02-22 RX ADMIN — INSULIN LISPRO 4 UNITS: 100 INJECTION, SOLUTION INTRAVENOUS; SUBCUTANEOUS at 09:54

## 2023-02-22 RX ADMIN — INSULIN LISPRO 4 UNITS: 100 INJECTION, SOLUTION INTRAVENOUS; SUBCUTANEOUS at 18:16

## 2023-02-22 RX ADMIN — SODIUM CHLORIDE, PRESERVATIVE FREE 10 ML: 5 INJECTION INTRAVENOUS at 09:32

## 2023-02-22 RX ADMIN — ATORVASTATIN CALCIUM 40 MG: 40 TABLET, FILM COATED ORAL at 09:31

## 2023-02-22 RX ADMIN — SIMETHICONE 80 MG: 80 TABLET, CHEWABLE ORAL at 18:15

## 2023-02-22 RX ADMIN — HYDROMORPHONE HYDROCHLORIDE 0.2 MG: 0.2 INJECTION, SOLUTION INTRAMUSCULAR; INTRAVENOUS; SUBCUTANEOUS at 23:08

## 2023-02-22 RX ADMIN — ASPIRIN 81 MG 81 MG: 81 TABLET ORAL at 09:31

## 2023-02-22 RX ADMIN — SIMETHICONE 80 MG: 80 TABLET, CHEWABLE ORAL at 12:56

## 2023-02-22 RX ADMIN — INSULIN GLARGINE 5 UNITS: 100 INJECTION, SOLUTION SUBCUTANEOUS at 21:06

## 2023-02-22 RX ADMIN — INSULIN LISPRO 1 UNITS: 100 INJECTION, SOLUTION INTRAVENOUS; SUBCUTANEOUS at 18:16

## 2023-02-22 RX ADMIN — COLLAGENASE SANTYL: 250 OINTMENT TOPICAL at 09:32

## 2023-02-22 RX ADMIN — SIMETHICONE 80 MG: 80 TABLET, CHEWABLE ORAL at 09:31

## 2023-02-22 RX ADMIN — INSULIN LISPRO 2 UNITS: 100 INJECTION, SOLUTION INTRAVENOUS; SUBCUTANEOUS at 12:57

## 2023-02-22 RX ADMIN — OXYCODONE HYDROCHLORIDE 5 MG: 5 TABLET ORAL at 21:05

## 2023-02-22 RX ADMIN — AMLODIPINE BESYLATE 2.5 MG: 2.5 TABLET ORAL at 09:31

## 2023-02-22 RX ADMIN — PANTOPRAZOLE SODIUM 40 MG: 40 TABLET, DELAYED RELEASE ORAL at 18:15

## 2023-02-22 RX ADMIN — DULOXETINE HYDROCHLORIDE 30 MG: 30 CAPSULE, DELAYED RELEASE ORAL at 09:31

## 2023-02-22 RX ADMIN — TAMSULOSIN HYDROCHLORIDE 0.4 MG: 0.4 CAPSULE ORAL at 18:15

## 2023-02-22 RX ADMIN — INSULIN LISPRO 4 UNITS: 100 INJECTION, SOLUTION INTRAVENOUS; SUBCUTANEOUS at 12:56

## 2023-02-22 RX ADMIN — PANTOPRAZOLE SODIUM 40 MG: 40 TABLET, DELAYED RELEASE ORAL at 09:31

## 2023-02-22 RX ADMIN — IOHEXOL 100 ML: 350 INJECTION, SOLUTION INTRAVENOUS at 10:25

## 2023-02-22 RX ADMIN — SIMETHICONE 80 MG: 80 TABLET, CHEWABLE ORAL at 21:06

## 2023-02-22 NOTE — PLAN OF CARE
Problem: PHYSICAL THERAPY ADULT  Goal: Performs mobility at highest level of function for planned discharge setting  See evaluation for individualized goals  Description: Treatment/Interventions: Functional transfer training, LE strengthening/ROM, Therapeutic exercise, Endurance training, Patient/family training, Bed mobility, Gait training, Spoke to nursing, OT, Family  Equipment Recommended: Richie Terrazas       See flowsheet documentation for full assessment, interventions and recommendations  Outcome: Progressing  Note: Prognosis: Good  Problem List: Decreased strength, Decreased endurance, Impaired balance, Decreased mobility, Decreased safety awareness, Pain  Assessment: Pt seen for PT treatment session this date with interventions consisting of gait training w/ emphasis on improving pt's ability to ambulate level surfaces x 80' with min A provided by therapist with RW, Therapeutic exercise consisting of: BLE exercises performed supine and seated and therapeutic activity consisting of training: bed mobility, supine<>sit transfers, sit<>stand transfers and vc and tactile cues for static standing posture faciliation  Pt agreeable to PT treatment session upon arrival, pt found supine in bed w/ HOB elevated, in no apparent distress and responsive  In comparison to previous session, pt with improvements in LE strength, balance and mobility  Post session: pt returned back to recliner, chair alarm engaged, all needs in reach and RN notified of session findings/recommendations  Continue to recommend post acute rehabilitation services at time of d/c in order to maximize pt's functional independence and safety w/ mobility  Pt continues to be functioning below baseline level, and remains limited 2* factors listed above and including continued need for medical management and monitoring, decreased strength and balance resulting in an increased risk for falls, decreased endurance and activity tolerance limiting mobility   PT will continue to see pt during current hospitalization in order to address the deficits listed above and provide interventions consistent w/ POC in effort to achieve STGs  Barriers to Discharge: Inaccessible home environment, Other (Comment) (decline in mobility status)     PT Discharge Recommendation: Post acute rehabilitation services    See flowsheet documentation for full assessment

## 2023-02-22 NOTE — PHYSICAL THERAPY NOTE
Physical Therapy Treatment Note    Patient's Name: Marta Ahumada    Admitting Diagnosis  Abdominal pain [R10 9]  Wound infection [T14  8XXA, L08 9]  Acute kidney injury ([facility] [address] ) [N17 9]  Sepsis, due to unspecified organism, unspecified whether acute organ dysfunction present [facility]) [A41 9]    Problem List  Patient Active Problem List   Diagnosis    Type 2 diabetes mellitus without complication, with long-term current use of insulin ([facility] [address] )    Benign essential hypertension    Mixed hyperlipidemia    Erectile dysfunction    Low testosterone    Obesity (BMI 30-39  9)    Testicular hypogonadism    Incarcerated umbilical hernia    Left ureteral calculus    Type 2 diabetes mellitus with hyperlipidemia (HCC)    Anemia    Hypokalemia    Transaminitis    Thrombocytosis    Iron deficiency anemia, unspecified    Malignant neoplasm of transverse colon (HCC)    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)    Colon cancer metastasized to liver [facility])    Colostomy prolapse (HCC)    Other fatigue    Cervical radiculopathy    Encephalopathy    MR (mitral regurgitation)    ESBL (extended spectrum beta-lactamase) producing bacteria infection    Severe protein-calorie malnutrition (HCC)    Acute on chronic kidney failure (HCC)    Sepsis (HCC)    Abscess    Acute pain    Leukocytosis    Dehiscence of incision    Elevated alkaline phosphatase level    Abnormal CT scan    Nephrolithiasis        02/22/23 0745   PT Last Visit   PT Visit Date 02/22/23   Note Type   Note Type Treatment for insurance authorization   Pain Assessment   Pain Assessment Tool 0-10   Pain Score No Pain   Restrictions/Precautions   Weight Bearing Precautions Per Order No   Braces or Orthoses Other (Comment)  (none reported)   Other Precautions Contact/isolation; Fall Risk   General   Chart Reviewed Yes   Response to Previous Treatment Patient with no complaints from previous session     Family/Caregiver Present No   Cognition   Overall Cognitive Status [facility] Arousal/Participation Alert; Cooperative   Attention Within functional limits   Orientation Level Oriented X4   Memory Within functional limits   Following Commands Follows all commands and directions without difficulty   Comments Pt agreeable to PT   Bed Mobility   Supine to Sit 5  Supervision   Additional items Assist x 1;HOB elevated; Bedrails; Increased time required;Verbal cues   Additional Comments Pt received at start of session supine in bed with HOB elevated  Following session, pt remained in recliner with needs met, alarm activated and call bell within reach   Transfers   Sit to Stand 4  Minimal assistance   Additional items Assist x 1; Armrests; Increased time required;Verbal cues   Stand to Sit 4  Minimal assistance   Additional items Assist x 1; Armrests; Increased time required;Verbal cues   Additional Comments with use of RW, VCs for hand placements   Ambulation/Elevation   Gait pattern Decreased foot clearance; Inconsistent farhan; Short stride;Decreased heel strike   Gait Assistance 4  Minimal assist   Additional items Assist x 1;Verbal cues   Assistive Device Rolling walker   Distance 80'   Stair Management Assistance Not tested   Balance   Static Sitting Fair +   Dynamic Sitting Fair   Static Standing Fair -   Dynamic Standing Fair -   Ambulatory Fair -   Endurance Deficit   Endurance Deficit Yes   Endurance Deficit Description decreased activity tolerance   Activity Tolerance   Activity Tolerance Patient limited by fatigue   Nurse Made Aware RN [place]   Exercises   Heelslides Supine;20 reps;AROM; Bilateral   Hip Flexion Sitting;10 reps;AROM; Bilateral   Knee AROM Long Arc Quad Sitting;10 reps;AROM; Bilateral   Ankle Pumps Sitting;20 reps;AROM; Bilateral   Assessment   Prognosis Good   Problem List Decreased strength;Decreased endurance; Impaired balance;Decreased mobility; Decreased safety awareness;Pain   Assessment Pt seen for PT treatment session this date with interventions consisting of gait training w/ emphasis on improving pt's ability to ambulate level surfaces x 80' with min A provided by therapist with RW, Therapeutic exercise consisting of: BLE exercises performed supine and seated and therapeutic activity consisting of training: bed mobility, supine<>sit transfers, sit<>stand transfers and vc and tactile cues for static standing posture faciliation  Pt agreeable to PT treatment session upon arrival, pt found supine in bed w/ HOB elevated, in no apparent distress and responsive  In comparison to previous session, pt with improvements in LE strength, balance and mobility  Post session: pt returned back to recliner, chair alarm engaged, all needs in reach and RN notified of session findings/recommendations  Continue to recommend post acute rehabilitation services at time of d/c in order to maximize pt's functional independence and safety w/ mobility  Pt continues to be functioning below baseline level, and remains limited 2* factors listed above and including continued need for medical management and monitoring, decreased strength and balance resulting in an increased risk for falls, decreased endurance and activity tolerance limiting mobility  PT will continue to see pt during current hospitalization in order to address the deficits listed above and provide interventions consistent w/ POC in effort to achieve STGs  Barriers to Discharge Inaccessible home environment; Other (Comment)  (decline in mobility status)   Goals   Patient Goals To keep getting stronger   STG Expiration Date 02/27/23   PT Treatment Day 5   Plan   Treatment/Interventions Functional transfer training;LE strengthening/ROM;ADL retraining; Therapeutic exercise; Endurance training;Bed mobility;Gait training; Compensatory technique education;Spoke to nursing;OT   Progress Progressing toward goals   PT Frequency 3-5x/wk   Recommendation   PT Discharge Recommendation Post acute rehabilitation services   AM-PAC Basic Mobility Inpatient   Turning in Flat Bed Without Bedrails 3   Lying on Back to Sitting on Edge of Flat Bed Without Bedrails 3   Moving Bed to Chair 3   Standing Up From Chair Using Arms 3   Walk in Room 3   Climb 3-5 Stairs With Railing 1   Basic Mobility Inpatient Raw Score 16   Basic Mobility Standardized Score 38 32   Highest Level Of Mobility   -HLM Goal 5: Stand one or more mins   JH-HLM Achieved 7: Walk 25 feet or more   Education   Education Provided Mobility training;Assistive device   Patient Reinforcement needed   End of Consult   Patient Position at End of Consult Bedside chair; All needs within reach       Blease Hamman, PT    Time In: 07:45  Time Out: 08:17  Total Time: 32 mins

## 2023-02-22 NOTE — ASSESSMENT & PLAN NOTE
Lab Results   Component Value Date    EGFR 52 02/22/2023    EGFR 58 02/21/2023    EGFR 55 02/20/2023    CREATININE 1 45 (H) 02/22/2023    CREATININE 1 33 (H) 02/21/2023    CREATININE 1 38 (H) 02/20/2023     · POA with creatinine 2 52; baseline 1-1 3  · Suspecting possible multifactorial cause in setting of dehydration and sepsis   Also likely Vancomycin contributing  · Continue with gentle IV fluids today given slight elevation in Cr  · Avoid nephrotoxic agents  · Steadily downtrending

## 2023-02-22 NOTE — PROGRESS NOTES
[facility]  Progress Note - Jessica Ferreira 1965, 61 y o  male MRN: [id number]  Unit/Bed#: -Shawn Encounter: [id number]  Primary Care Provider: Khanh Estrada MD   Date and time admitted to hospital: 2/7/2023  3:01 PM    Nephrolithiasis  [address] revealed 7 mm nonobstructing right intrarenal calculus  No hydronephrosis noted on CT a/p  Asymptomatic at this time  · Flomax  · Intake and output    Abnormal CT scan  Assessment & Plan  CT shows interval decrease in bilateral pleural effusions, however multiple small bilateral lung nodules are seen which are concerning for possible metastatic lung disease  · Patient reports having prior known lung nodules  · Oxygenating appropriately on room air; continue to monitor oxygenation  · Outpatient f/u    Elevated alkaline phosphatase level  Assessment & Plan  Chronically elevated likely in setting of known hepatic metastasis  · RUQ US Decompressed gallbladder around a cholecystostomy tube, limiting evaluation  Hepatomegaly  Heterogeneous echotexture of the liver in this patient with known metastatic disease  7 mm nonobstructing right intrarenal calculus  · Monitor with am CMP    Acute on chronic kidney failure [facility])  Assessment & Plan  Lab Results   Component Value Date    EGFR 52 02/22/2023    EGFR 58 02/21/2023    EGFR 55 02/20/2023    CREATININE 1 45 (H) 02/22/2023    CREATININE 1 33 (H) 02/21/2023    CREATININE 1 38 (H) 02/20/2023     · POA with creatinine 2 52; baseline 1-1 3  · Suspecting possible multifactorial cause in setting of dehydration and sepsis  Also likely Vancomycin contributing  · Continue with gentle IV fluids today given slight elevation in Cr  · Avoid nephrotoxic agents  · Steadily downtrending    Colon cancer metastasized to liver [facility])  Assessment & Plan  Follows w/ outpatient [facility] onc and palliative care   Extensive abdominal surgical history (11/7 ex lap w/ section 3 liver resection, and development of left upper quadrant hematoma and suspected leak from transverse colon anastomosis complication; 79/10 right hemicolectomy; 11/17 re-exploration w/partial descending colectomy; 12/8 IR percutaneous cholecystostomy tube and hepatectomy abscess drainage; 12/20 IR drains placed for perisplenic abscess)  · Patient does not wish to follow with [place] oncology anymore as it is too far of a drive; would like to establish with [place] [facility] (already follows with Dr Chetna Dixon)  · Continue outpatient f/u    Anemia  Assessment & Plan  Recent Labs     02/20/23  1420 02/21/23  0607 02/22/23  0458   HGB 8 4* 7 5* 8 0*     · 2/16 received BT 1u PRBC  · No overt bleeding noted  · Monitor CBC, transfuse as needed  · Repeat hemoglobin this afternoon stable    Benign essential hypertension  Assessment & Plan  BP stable  · Continue prehospital HTN medications  · Monitor BP per unit protocol    Type 2 diabetes mellitus without complication, with long-term current use of insulin (HCC)  Assessment & Plan    Lab Results   Component Value Date    HGBA1C 8 0 (H) 09/26/2022     · Home Lantus 8U qHS and humalog 4U TID w/meals   · Dose reduce to 5U qhs + 4 U TID   · 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   · Hypoglycemia protocol  · Diabetic diet     * Sepsis ([address] )  Assessment & Plan  Noted the development of foul-smelling drainage and increasing pain from his chronic abdominal wound  · As evidenced by tachycardia, tachypnea, and leukocytosis  · In setting of purulent abdominal infection   · Hx of colon CA w/ extensive abd surgical hx as below   · Patient has several abdominal drains in place, with CT showing improvement in the fluid collections at the left liver lobe, perisplenic area, and the left retroperitoneum inferior to the spleen  · Completed IV Ertapenem and IV Vancomycin  · ID consulted; recommendations appreciated   · S/p bedside midline wound debridement + wet dressing 2/8 with general surgery  · S/p IR tube check 2/17/2023, drains kept in place  Discussed with IR, follow up in 1 month  · Given ongoing low grade fever and worsening leukocytosis, ordered repeat blood cultures and repeat CT a/p  · Currently continue to monitor off abx  · Awaiting placement          VTE Pharmacologic Prophylaxis: VTE Score: 6 Moderate Risk (Score 3-4) - Pharmacological DVT Prophylaxis Ordered: heparin  Patient Centered Rounds: I performed bedside rounds with nursing staff today  Discussions with Specialists or Other Care Team Provider: JIMENA    Education and Discussions with Family / Patient: Patient declined call to   Total Time Spent on Date of Encounter in care of patient: 65 minutes This time was spent on one or more of the following: performing physical exam; counseling and coordination of care; obtaining or reviewing history; documenting in the medical record; reviewing/ordering tests, medications or procedures; communicating with other healthcare professionals and discussing with patient's family/caregivers  Current Length of Stay: 15 day(s)  Current Patient Status: Inpatient   Certification Statement: The patient will continue to require additional inpatient hospital stay due to worsening leukocytosis  Discharge Plan: Anticipate discharge in 24-48 hrs to rehab facility  Code Status: Level 1 - Full Code    Subjective:   Patient states he is a little upset he cannot go to [facility] but understands that he needs further work-up given his worsening white count and low-grade fevers  Denies fevers or chills, nausea, vomiting  Feels little bloated but has no abdominal pain  Denies creased output from colostomy bag  All other review systems negative      Objective:     Vitals:   Temp (24hrs), Av 7 °F (37 1 °C), Min:97 4 °F (36 3 °C), Max:100 2 °F (37 9 °C)    Temp:  [97 4 °F (36 3 °C)-100 2 °F (37 9 °C)] 97 4 °F (36 3 °C)  HR:  [] 99  Resp:  [20] 20  BP: (111-147)/(76-91) 111/76  SpO2:  [95 %-99 %] 99 %  Body mass index is 27 98 kg/m²  Input and Output Summary (last 24 hours): Intake/Output Summary (Last 24 hours) at 2/22/2023 1332  Last data filed at 2/22/2023 0501  Gross per 24 hour   Intake --   Output 1590 ml   Net -1590 ml       Physical Exam:   Physical Exam  Vitals reviewed  Constitutional:       General: He is not in acute distress  Appearance: He is well-developed  He is not diaphoretic  HENT:      Head: Normocephalic and atraumatic  Mouth/Throat:      Pharynx: No oropharyngeal exudate  Eyes:      General: No scleral icterus  Extraocular Movements: Extraocular movements intact  Conjunctiva/sclera: Conjunctivae normal    Neck:      Vascular: No JVD  Trachea: No tracheal deviation  Cardiovascular:      Rate and Rhythm: Normal rate and regular rhythm  Heart sounds: No murmur heard  No friction rub  No gallop  Pulmonary:      Effort: Pulmonary effort is normal  No respiratory distress  Breath sounds: No stridor  No wheezing  Abdominal:      General: There is no distension  Palpations: Abdomen is soft  There is no mass  Tenderness: There is no abdominal tenderness  There is no right CVA tenderness or left CVA tenderness  Comments: Ostomy bag in place   Musculoskeletal:         General: No tenderness  Normal range of motion  Right lower leg: No edema  Left lower leg: No edema  Skin:     General: Skin is warm and dry  Coloration: Skin is pale  Findings: No erythema  Neurological:      Mental Status: He is alert and oriented to person, place, and time  Psychiatric:         Behavior: Behavior normal          Thought Content:  Thought content normal           Additional Data:     Labs:  Results from last 7 days   Lab Units 02/22/23  0458 02/16/23  2148 02/16/23  0530   WBC Thousand/uL 15 26*   < > 10 42*   HEMOGLOBIN g/dL 8 0*   < > 6 8*   HEMATOCRIT % 25 6*   < > 23 2*   PLATELETS Thousands/uL 362   < > 345   NEUTROS PCT %  --   --  67   LYMPHS PCT %  --   --  18   MONOS PCT %  --   --  12   EOS PCT %  --   --  0    < > = values in this interval not displayed  Results from last 7 days   Lab Units 02/22/23  0458 02/16/23  0530 02/15/23  1412   SODIUM mmol/L 132*   < > 131*   POTASSIUM mmol/L 3 8   < > 4 1   CHLORIDE mmol/L 102   < > 103   CO2 mmol/L 20*   < > 21   BUN mg/dL 15   < > 22   CREATININE mg/dL 1 45*   < > 1 21   ANION GAP mmol/L 10   < > 7   CALCIUM mg/dL 7 7*   < > 7 9*   ALBUMIN g/dL  --   --  2 0*   TOTAL BILIRUBIN mg/dL  --   --  0 60   ALK PHOS U/L  --   --  723*   ALT U/L  --   --  9   AST U/L  --   --  31   GLUCOSE RANDOM mg/dL 120   < > 172*    < > = values in this interval not displayed           Results from last 7 days   Lab Units 02/22/23  1129 02/22/23  0724 02/21/23  2102 02/21/23  1631 02/21/23  1123 02/21/23  0733 02/20/23  2052 02/20/23  1610 02/20/23  1102 02/20/23  0720 02/19/23  2153 02/19/23  1736   POC GLUCOSE mg/dl 199* 122 125 147* 112 81 163* 167* 200* 110 169* 132         Results from last 7 days   Lab Units 02/21/23  1022 02/16/23  0530 02/15/23  1412   LACTIC ACID mmol/L 1 3  --   --    PROCALCITONIN ng/ml  --  0 51* 0 46*       Lines/Drains:  Invasive Devices     Central Venous Catheter Line  Duration           Port A Cath 03/10/22 Right Chest 349 days          Drain  Duration           Ileostomy LUQ 96 days    Cholecystostomy Tube 71 days    Abscess Drain Abdomen 40 days                Central Line:  Goal for removal: N/A - Chronic PICC             Imaging: Reviewed radiology reports from this admission including: abdominal/pelvic CT    Recent Cultures (last 7 days):         Last 24 Hours Medication List:   Current Facility-Administered Medications   Medication Dose Route Frequency Provider Last Rate   • acetaminophen  650 mg Oral Q4H PRN Chris Benites PA-C     • amLODIPine  2 5 mg Oral Daily Chris Benites PA-C     • aspirin  81 mg Oral Daily Chris Benites PA-C     • atorvastatin  40 mg Oral Daily Duane Szymasnki PA-C     • collagenase   Topical Daily Flakita Chu PA-C     • DULoxetine  30 mg Oral Daily [facility] [place]     • heparin (porcine)  5,000 Units Subcutaneous [place], [place]     • HYDROmorphone  0 2 mg Intravenous Q4H PRN Nelia Kenzie Saraiya, DO     • influenza vaccine  0 5 mL Intramuscular Prior to discharge Marlene Michele MD     • insulin glargine  5 Units Subcutaneous HS Kiara Wang PA-C     • insulin lispro  1-6 [facility]ID [facility]     • insulin lispro  1-6 Units Subcutaneous HS Duane Szymanski PA-C     • insulin lispro  4 Units Subcutaneous TID With Meals Duane Szmyanski PA-C     • melatonin  6 mg Oral HS PRN Enrike To PA-C     • methocarbamol  500 mg Oral BID PRN Duane Szymanski PA-C     • multi-electrolyte  100 mL/hr Intravenous Continuous Nelia Kenzie Saraiya,  mL/hr (02/22/23 1256)   • ondansetron  4 mg Intravenous Q4H PRN Nelia Kenzie Saraiya, DO     • oxyCODONE  5 mg Oral Q4H PRN Nelia Kenzie Saraiya, DO     • oxyCODONE  2 5 mg Oral Q4H PRN Nelia Kenzie Saraiya, DO     • pantoprazole  40 mg Oral BID Rebeccapam Vazquez PA-C     • pneumococcal 13-valent conjugate vaccine  0 5 mL Intramuscular Prior to discharge Marlene Michele MD     • simethicone  80 mg Oral 4x Daily (with meals and at bedtime) Duane Szymanski PA-C     • sodium chloride (PF)  10 mL Intravenous Daily Nelia Kenziepriscilla Thaoya, DO     • tamsulosin  0 4 mg Oral Daily [facility] [address] [facility]          Today, Patient Was Seen By: Ila Burden DO    **Please Note: This note may have been constructed using a voice recognition system  **

## 2023-02-22 NOTE — OCCUPATIONAL THERAPY NOTE
Occupational Therapy Progress Note     Patient Name: Chi WOODS Date: 2/22/2023      Problem List  Principal Problem:    Sepsis [facility])  Active Problems:    Type 2 diabetes mellitus without complication, with long-term current use of insulin (HCC)    Benign essential hypertension    Anemia    Colon cancer metastasized to liver [facility])    Acute on chronic kidney failure (HCC)    Elevated alkaline phosphatase level    Abnormal CT scan    Nephrolithiasis        02/22/23 1125   OT Last Visit   OT Visit Date 02/22/23   Note Type   Note Type Treatment   Pain Assessment   Pain Assessment Tool 0-10   Pain Score No Pain   Restrictions/Precautions   Weight Bearing Precautions Per Order No   Other Precautions Contact/isolation; Fall Risk   ADL   Eating Assistance 5  Supervision/Setup   Eating Deficit Setup   Grooming Assistance 5  Supervision/Setup   Grooming Deficit Setup; Increased time to complete;Brushing hair;Wash/dry face  (while seated)   UB Bathing Assistance 4  Minimal Assistance   UB Bathing Deficit Setup;Supervision/safety; Increased time to complete  (while seated)   LB Bathing Assistance 4  Minimal Assistance   LB Bathing Deficit Setup;Supervision/safety; Increased time to complete  (pt able to perform crossover technique to reach lower legs and feet  Assist for balance while standing for james hygiene)   UB Dressing Assistance 4  Minimal Assistance   UB Dressing Deficit Setup;Supervision/safety; Increased time to complete  (while seated)   LB Dressing Assistance 4  Minimal Assistance   LB Dressing Deficit Setup;Supervision/safety; Increased time to complete; Requires assistive device for steadying   Toileting Assistance  5  Supervision/Setup   Toileting Deficit Setup  (+ ostomy)   Functional Standing Tolerance   Time ~1 minute   Activity standing during bathing   Comments Min A x1 with RW   Bed Mobility   Additional Comments Pt receieved OOB in bed side chair at start of session   Transfers   Sit to Stand 4 Minimal assistance   Additional items Assist x 1; Armrests; Increased time required  (with RW)   Stand to Sit 4  Minimal assistance   Additional items Assist x 1; Armrests; Increased time required   Functional Mobility   Functional Mobility 4  Minimal assistance   Additional Comments assist x1   Subjective   Subjective Pt agreeable to OT treatment session   Cognition   Overall Cognitive Status [facility]   Arousal/Participation Alert; Cooperative   Attention Within functional limits   Orientation Level Oriented X4   Memory Within functional limits   Following Commands Follows all commands and directions without difficulty   Comments Pt highly motivated to participate with therapy, expressed he is looking forward to acute rehab to continue his functional progress   Activity Tolerance   Activity Tolerance Patient limited by fatigue   Medical Staff Made Aware Zulay Haro RN   Assessment   Assessment Pt seen this date for skilled OT session focused on ADLs, functional transfers and mobility, safety education  The patient was received seated in bed side chair, NAD, PIV access, 2 abdominal drains present  He participated in bathing, dressing, and grooming ADLs this session  Pt required Minimal Assistance to perform bathing, and Moderate Assistance for LB dressing  Supervision for grooming with set up while seated  At end of session the patient was located seated in bed side chair with call bell in reach and all needs met  Overall the patient continues to make functional gains towards goals  He remains below his functional baseline, and is primarily limited at this time due to generalized deconditioning and decreased activity tolerance  Pt is highly motivated to participate with therapy and regain functional independence  OT will continue to follow while acute to address POC  At this time, recommend acute inpatient rehab upon d/c    Plan   Treatment Interventions ADL retraining;Functional transfer training; Endurance training;Patient/family training   Goal Expiration Date 03/08/23   OT Treatment Day 4   OT Frequency 3-5x/wk   Recommendation   OT Discharge Recommendation Post acute rehabilitation services   Additional Comments  The patient's raw score on the AM-PAC Daily Activity Inpatient Short Form is 17  A raw score of less than 19 suggests the patient may benefit from discharge to post-acute rehabilitation services  Please refer to the recommendation of the Occupational Therapist for safe discharge planning     AM-PAC Daily Activity Inpatient   Lower Body Dressing 2   Bathing 2   Toileting 3   Upper Body Dressing 3   Grooming 3   Eating 4   Daily Activity Raw Score 17   Daily Activity Standardized Score (Calc for Raw Score >=11) 37 26   AM-[facility] Applied Cognition Inpatient   Following a Speech/Presentation 4   Understanding Ordinary Conversation 4   Taking Medications 4   Remembering Where Things Are Placed or Put Away 4   Remembering List of 4-5 Errands 4   Taking Care of Complicated Tasks 4   Applied Cognition Raw Score 24   Applied Cognition Standardized Score 62 21       Updated Goals: to be met by 3/8/23    Patient will perform functional bed mobility with modified independence, with HOB flat, no rails  Patient will perform functional transfers with [facility] in preparation for ADL tasks, with good safety awareness  Patient will perform UB dressing task with [address] while seated, with set up  Patient will perform LB dressing task with [address]   Patient will increase BUE strength by 1 MM grade in preparation to increase ability to participate in ADL tasks  Patient will improve activity tolerance by participating in 30 minutes of session at a time in preparation for participation in ADL tasks  Patient will identify 3 potential fall hazards and identify compensatory techniques to decrease fall risk in the home environment       JOSE L Mckinley/DORIAN

## 2023-02-22 NOTE — PLAN OF CARE
Problem: PAIN - ADULT  Goal: Verbalizes/displays adequate comfort level or baseline comfort level  Description: Interventions:  - Encourage patient to monitor pain and request assistance  - Assess pain using appropriate pain scale  - Administer analgesics based on type and severity of pain and evaluate response  - Implement non-pharmacological measures as appropriate and evaluate response  - Consider cultural and social influences on pain and pain management  - Notify physician/advanced practitioner if interventions unsuccessful or patient reports new pain  Outcome: Progressing     Problem: INFECTION - ADULT  Goal: Absence or prevention of progression during hospitalization  Description: INTERVENTIONS:  - Assess and monitor for signs and symptoms of infection  - Monitor lab/diagnostic results  - Monitor all insertion sites, i e  indwelling lines, tubes, and drains  - Monitor endotracheal if appropriate and nasal secretions for changes in amount and color  - Gazelle appropriate cooling/warming therapies per order  - Administer medications as ordered  - Instruct and encourage patient and family to use good hand hygiene technique  - Identify and instruct in appropriate isolation precautions for identified infection/condition  Outcome: Progressing  Goal: Absence of fever/infection during neutropenic period  Description: INTERVENTIONS:  - Monitor WBC    Outcome: Progressing     Problem: SAFETY ADULT  Goal: Patient will remain free of falls  Description: INTERVENTIONS:  - Educate patient/family on patient safety including physical limitations  - Instruct patient to call for assistance with activity   - Consult OT/PT to assist with strengthening/mobility   - Keep Call bell within reach  - Keep bed low and locked with side rails adjusted as appropriate  - Keep care items and personal belongings within reach  - Initiate and maintain comfort rounds  - Make Fall Risk Sign visible to staff  - Offer Toileting every  Hours, in advance of need  - Initiate/Maintain alarm  - Obtain necessary fall risk management equipment:   - Apply yellow socks and bracelet for high fall risk patients  - Consider moving patient to room near nurses station  Outcome: Progressing  Goal: Maintain or return to baseline ADL function  Description: INTERVENTIONS:  -  Assess patient's ability to carry out ADLs; assess patient's baseline for ADL function and identify physical deficits which impact ability to perform ADLs (bathing, care of mouth/teeth, toileting, grooming, dressing, etc )  - Assess/evaluate cause of self-care deficits   - Assess range of motion  - Assess patient's mobility; develop plan if impaired  - Assess patient's need for assistive devices and provide as appropriate  - Encourage maximum independence but intervene and supervise when necessary  - Involve family in performance of ADLs  - Assess for home care needs following discharge   - Consider OT consult to assist with ADL evaluation and planning for discharge  - Provide patient education as appropriate  Outcome: Progressing  Goal: Maintains/Returns to pre admission functional level  Description: INTERVENTIONS:  - Perform BMAT or MOVE assessment daily    - Set and communicate daily mobility goal to care team and patient/family/caregiver  - Collaborate with rehabilitation services on mobility goals if consulted  - Perform Range of Motion  times a day  - Reposition patient every  hours    - Dangle patient times a day  - Stand patient  times a day  - Ambulate patient  times a day  - Out of bed to chair  times a day   - Out of bed for meals  times a day  - Out of bed for toileting  - Record patient progress and toleration of activity level   Outcome: Progressing     Problem: DISCHARGE PLANNING  Goal: Discharge to home or other facility with appropriate resources  Description: INTERVENTIONS:  - Identify barriers to discharge w/patient and caregiver  - Arrange for needed discharge resources and transportation as appropriate  - Identify discharge learning needs (meds, wound care, etc )  - Arrange for interpretive services to assist at discharge as needed  - Refer to Case Management Department for coordinating discharge planning if the patient needs post-hospital services based on physician/advanced practitioner order or complex needs related to functional status, cognitive ability, or social support system  Outcome: Progressing     Problem: Nutrition/Hydration-ADULT  Goal: Nutrient/Hydration intake appropriate for improving, restoring or maintaining nutritional needs  Description: Monitor and assess patient's nutrition/hydration status for malnutrition  Collaborate with interdisciplinary team and initiate plan and interventions as ordered  Monitor patient's weight and dietary intake as ordered or per policy  Utilize nutrition screening tool and intervene as necessary  Determine patient's food preferences and provide high-protein, high-caloric foods as appropriate       INTERVENTIONS:  - Monitor oral intake, urinary output, labs, and treatment plans  - Assess nutrition and hydration status and recommend course of action  - Evaluate amount of meals eaten  - Assist patient with eating if necessary   - Allow adequate time for meals  - Recommend/ encourage appropriate diets, oral nutritional supplements, and vitamin/mineral supplements  - Order, calculate, and assess calorie counts as needed  - Recommend, monitor, and adjust tube feedings and TPN/PPN based on assessed needs  - Assess need for intravenous fluids  - Provide specific nutrition/hydration education as appropriate  - Include patient/family/caregiver in decisions related to nutrition  Outcome: Progressing     Problem: MOBILITY - ADULT  Goal: Maintain or return to baseline ADL function  Description: INTERVENTIONS:  -  Assess patient's ability to carry out ADLs; assess patient's baseline for ADL function and identify physical deficits which impact ability to perform ADLs (bathing, care of mouth/teeth, toileting, grooming, dressing, etc )  - Assess/evaluate cause of self-care deficits   - Assess range of motion  - Assess patient's mobility; develop plan if impaired  - Assess patient's need for assistive devices and provide as appropriate  - Encourage maximum independence but intervene and supervise when necessary  - Involve family in performance of ADLs  - Assess for home care needs following discharge   - Consider OT consult to assist with ADL evaluation and planning for discharge  - Provide patient education as appropriate  Outcome: Progressing  Goal: Maintains/Returns to pre admission functional level  Description: INTERVENTIONS:  - Perform BMAT or MOVE assessment daily    - Set and communicate daily mobility goal to care team and patient/family/caregiver  - Collaborate with rehabilitation services on mobility goals if consulted  - Perform Range of Motion  times a day  - Reposition patient every  hours    - Dangle patient  times a day  - Stand patient  times a day  - Ambulate patient  times a day  - Out of bed to chair  times a day   - Out of bed for meals times a day  - Out of bed for toileting  - Record patient progress and toleration of activity level   Outcome: Progressing

## 2023-02-22 NOTE — ASSESSMENT & PLAN NOTE
Follows w/ outpatient [facility] onc and palliative care   Extensive abdominal surgical history (11/7 ex lap w/ section 3 liver resection, and development of left upper quadrant hematoma and suspected leak from transverse colon anastomosis complication; 49/24 right hemicolectomy; 11/17 re-exploration w/partial descending colectomy; 12/8 IR percutaneous cholecystostomy tube and hepatectomy abscess drainage; 12/20 IR drains placed for perisplenic abscess)  · Patient does not wish to follow with [facility] oncology anymore as it is too far of a drive; would like to establish with [facility] oncology (already follows with Dr Chetna Dixon)  · Continue outpatient f/u

## 2023-02-22 NOTE — PLAN OF CARE
Problem: OCCUPATIONAL THERAPY ADULT  Goal: Performs self-care activities at highest level of function for planned discharge setting  See evaluation for individualized goals  Description: Treatment Interventions: ADL retraining, Functional transfer training, Endurance training, Patient/family training          See flowsheet documentation for full assessment, interventions and recommendations  Outcome: Progressing  Note: Limitation: Decreased ADL status  Prognosis: Good  Assessment: Pt seen this date for skilled OT session focused on ADLs, functional transfers and mobility, safety education  The patient was received seated in bed side chair, NAD, PIV access, 2 abdominal drains present  He participated in bathing, dressing, and grooming ADLs this session  Pt required Minimal Assistance to perform bathing, and Moderate Assistance for LB dressing  Supervision for grooming with set up while seated  At end of session the patient was located seated in bed side chair with call bell in reach and all needs met  Overall the patient continues to make functional gains towards goals  He remains below his functional baseline, and is primarily limited at this time due to generalized deconditioning and decreased activity tolerance  Pt is highly motivated to participate with therapy and regain functional independence  OT will continue to follow while acute to address POC   At this time, recommend acute inpatient rehab upon d/c      OT Discharge Recommendation: Post acute rehabilitation services        Krissy Neil OTR/L

## 2023-02-22 NOTE — ASSESSMENT & PLAN NOTE
Noted the development of foul-smelling drainage and increasing pain from his chronic abdominal wound  · As evidenced by tachycardia, tachypnea, and leukocytosis  · In setting of purulent abdominal infection   · Hx of colon CA w/ extensive abd surgical hx as below   · Patient has several abdominal drains in place, with CT showing improvement in the fluid collections at the left liver lobe, perisplenic area, and the left retroperitoneum inferior to the spleen  · Completed IV Ertapenem and IV Vancomycin  · ID consulted; recommendations appreciated   · S/p bedside midline wound debridement + wet dressing 2/8 with general surgery  · S/p IR tube check 2/17/2023, drains kept in place   Discussed with IR, follow up in 1 month  · Given ongoing low grade fever and worsening leukocytosis, ordered repeat blood cultures and repeat CT a/p  · Currently continue to monitor off abx  · Awaiting placement

## 2023-02-22 NOTE — ASSESSMENT & PLAN NOTE
Recent Labs     02/20/23  1420 02/21/23  0607 02/22/23  0458   HGB 8 4* 7 5* 8 0*     · 2/16 received BT 1u PRBC  · No overt bleeding noted  · Monitor CBC, transfuse as needed  · Repeat hemoglobin this afternoon stable

## 2023-02-22 NOTE — TELEPHONE ENCOUNTER
Per Dr Florentino Morataya, I contacted Mckenzie Azevedo and introduced myself and explained that I was calling from Dr Rema Andrea office at [place] [address] Hematology/Oncology to reschedule his follow up appointment with Dr Florentino Morataya from tomorrow to 3/2  Mckenzie Azevedo indicated that he believes that he will be going to [facility] for rehab for about three weeks  I expressed to Mckenzie Azevedo that I would place him on my list here at [place] and would watch his chart to see what his discharge plan  Based on his discharge plan, I will be able to reschedule his follow up with Dr Florentino Morataya  I did let Mckenzie Azevedo know that I will reach out to him regarding his new appointment with Dr Florentino Morataya and, if he comes to the [facility], I will stop by to introduce myself and review his appointment details  Guicho expressed understanding and was appreciative of the call

## 2023-02-22 NOTE — PROGRESS NOTES
Patient alert and oriented x 4  Pleasant to staff and cooperative with care  Patient given prn pain medication upon request  Patient also given melatonin  Patient slept during shift  Output measured  No deviation in assessment finding noted when compared to previous documentation  Will continue to monitor

## 2023-02-22 NOTE — PROGRESS NOTES
Progress Note - Infectious Disease   Sandy Sepulveda 61 y o  male MRN: [id number]  Unit/Bed#: -[facility] Encounter: [id number]      Impression/Plan:  1   Systemic inflammatory response syndrome   Leukocytosis and tachycardia   Present on admission   Possible sepsis without a well-defined source   The patient CT chest abdomen and pelvis does not reveal any new source of sepsis   Urinalysis nondiagnostic for UTI   The abdominal wound has been debrided   Unclear significance of the extremely high alkaline phosphatase level with the total bilirubin normal and the remainder of the transaminases not proportionally high   Consideration for the possibility of bacteremia   Right upper quadrant ultrasound without biliary ductal dilatation   Wound culture with ESBL and MRSA   No recurrence of the fever and the white cell count has come down substantially   Patient completed more than a week of treatment for the ESBL and MRSA  Have been stable off antibiotics for 4 days but now with intermittent low-grade elevations in the temperature and a leukocytosis     -No additional antibiotics for now  -Surgery follow-up  -Source control measures as below  -Supportive care  -Recheck blood cultures x2 sets and have low threshold to repeat CT abdomen pelvis     2   Intra-abdominal abscesses   Status post IR drainage with drains remaining in place but with improved findings on repeat CT scan   Patient completed several weeks of intravenous antibiotics   No evidence of a new abscess   -No additional antibiotics for now  -Surgery follow-up  -Serial exams     3   ESBL E  coli bacteremia/abscess formation   Status post prolonged course of intravenous antibiotics with clearance of the bacteremia   Abscess is improved as above  -No directed antibiotics for this problem  -Drain management     4   Abdominal wound   No overt cellulitis or purulence seen although there is some exudate   Possible mild wound infection that is status post debridement by surgery   ESBL and MRSA   Status post more than a week of IV antibiotics  -Local wound care  -Serial abdominal exams  -Surgery follow-up  -Antibiotics as above     5   Metastatic colon cancer   Status post extensive surgical resection with reexploration in the setting of complication     6   Acute kidney injury   Suspect secondary to prerenal issues   No other clear source appreciated   Renal function improving and now stabilized   Renal function waxing and waning but is relatively stable  -Volume management    Have discussed the above management plan with the primary service and they agree with the plan    Antibiotics:  Status post 10 days of antibiotics  Off antibiotics 6    Subjective:  Patient has no fever, chills, sweats; still having intermittent elevations in the temperature  No nausea, vomiting, diarrhea; no cough, shortness of breath; no pain  No new symptoms  Objective:  Vitals:  Temp:  [97 4 °F (36 3 °C)-100 2 °F (37 9 °C)] 97 4 °F (36 3 °C)  HR:  [] 95  Resp:  [20] 20  BP: (111-147)/(76-91) 111/76  SpO2:  [95 %-99 %] 99 %  Temp (24hrs), Av 9 °F (37 2 °C), Min:97 4 °F (36 3 °C), Max:100 2 °F (37 9 °C)  Current: Temperature: (!) 97 4 °F (36 3 °C)    Physical Exam:   General Appearance:  Alert, interactive, nontoxic, no acute distress  Throat: Oropharynx moist without lesions  Lungs:   Clear to auscultation bilaterally; no wheezes, rhonchi or rales; respirations unlabored   Heart:  RRR; no murmur, rub or gallop   Abdomen:   Soft, non-tender, non-distended, positive bowel sounds  Ostomy with output  Right-sided biliary drain in place  VICTORINO drain in place  Wound dressed with a dry dressing in place  Extremities: No clubbing, cyanosis or edema   Skin: No new rashes or lesions  No draining wounds noted         Labs, Imaging, & Other studies:   All pertinent labs and imaging studies were personally reviewed  Results from last 7 days   Lab Units 23  0451 23  1222 02/20/23  1420 02/20/23  0521   WBC Thousand/uL 15 26* 11 26*  --  10 23*   HEMOGLOBIN g/dL 8 0* 7 5* 8 4* 7 3*   PLATELETS Thousands/uL 362 305  --  303     Results from last 7 days   Lab Units 02/22/23  0458 02/21/23  0607 02/20/23  0521 02/16/23  0530 02/15/23  1412   SODIUM mmol/L 132* 135 136   < > 131*   POTASSIUM mmol/L 3 8 3 8 3 4*   < > 4 1   CHLORIDE mmol/L 102 105 106   < > 103   CO2 mmol/L 20* 21 20*   < > 21   BUN mg/dL 15 13 12   < > 22   CREATININE mg/dL 1 45* 1 33* 1 38*   < > 1 21   EGFR ml/min/1 73sq m 52 58 55   < > 65   CALCIUM mg/dL 7 7* 7 5* 7 5*   < > 7 9*   AST U/L  --   --   --   --  31   ALT U/L  --   --   --   --  9   ALK PHOS U/L  --   --   --   --  723*    < > = values in this interval not displayed           Results from last 7 days   Lab Units 02/16/23  0530 02/15/23  1412   PROCALCITONIN ng/ml 0 51* 0 46*

## 2023-02-22 NOTE — PLAN OF CARE
Problem: PAIN - ADULT  Goal: Verbalizes/displays adequate comfort level or baseline comfort level  Description: Interventions:  - Encourage patient to monitor pain and request assistance  - Assess pain using appropriate pain scale  - Administer analgesics based on type and severity of pain and evaluate response  - Implement non-pharmacological measures as appropriate and evaluate response  - Consider cultural and social influences on pain and pain management  - Notify physician/advanced practitioner if interventions unsuccessful or patient reports new pain  Outcome: Progressing     Problem: INFECTION - ADULT  Goal: Absence or prevention of progression during hospitalization  Description: INTERVENTIONS:  - Assess and monitor for signs and symptoms of infection  - Monitor lab/diagnostic results  - Monitor all insertion sites, i e  indwelling lines, tubes, and drains  - Monitor endotracheal if appropriate and nasal secretions for changes in amount and color  - [place] appropriate cooling/warming therapies per order  - Administer medications as ordered  - Instruct and encourage patient and family to use good hand hygiene technique  - Identify and instruct in appropriate isolation precautions for identified infection/condition  Outcome: Progressing  Goal: Absence of fever/infection during neutropenic period  Description: INTERVENTIONS:  - Monitor WBC    Outcome: Progressing     Problem: SAFETY ADULT  Goal: Patient will remain free of falls  Description: INTERVENTIONS:  - Educate patient/family on patient safety including physical limitations  - Instruct patient to call for assistance with activity   - Consult OT/PT to assist with strengthening/mobility   - Keep Call bell within reach  - Keep bed low and locked with side rails adjusted as appropriate  - Keep care items and personal belongings within reach  - Initiate and maintain comfort rounds  - Make Fall Risk Sign visible to staff  - Offer Toileting every 2 Hours, in advance of need  - Initiate/Maintain bed alarm  - Obtain necessary fall risk management equipment: call bell within reach  - Apply yellow socks and bracelet for high fall risk patients  - Consider moving patient to room near nurses station  Outcome: Progressing  Goal: Maintain or return to baseline ADL function  Description: INTERVENTIONS:  -  Assess patient's ability to carry out ADLs; assess patient's baseline for ADL function and identify physical deficits which impact ability to perform ADLs (bathing, care of mouth/teeth, toileting, grooming, dressing, etc )  - Assess/evaluate cause of self-care deficits   - Assess range of motion  - Assess patient's mobility; develop plan if impaired  - Assess patient's need for assistive devices and provide as appropriate  - Encourage maximum independence but intervene and supervise when necessary  - Involve family in performance of ADLs  - Assess for home care needs following discharge   - Consider OT consult to assist with ADL evaluation and planning for discharge  - Provide patient education as appropriate  Outcome: Progressing  Goal: Maintains/Returns to pre admission functional level  Description: INTERVENTIONS:  - Perform BMAT or MOVE assessment daily    - Set and communicate daily mobility goal to care team and patient/family/caregiver  - Collaborate with rehabilitation services on mobility goals if consulted  - Perform Range of Motion 3 times a day  - Reposition patient every 2 hours    - Dangle patient 3 times a day  - Stand patient 3 times a day  - Ambulate patient 3 times a day  - Out of bed to chair 3 times a day   - Out of bed for meals 3 times a day  - Out of bed for toileting  - Record patient progress and toleration of activity level   Outcome: Progressing     Problem: DISCHARGE PLANNING  Goal: Discharge to home or other facility with appropriate resources  Description: INTERVENTIONS:  - Identify barriers to discharge w/patient and caregiver  - Arrange for needed discharge resources and transportation as appropriate  - Identify discharge learning needs (meds, wound care, etc )  - Arrange for interpretive services to assist at discharge as needed  - Refer to Case Management Department for coordinating discharge planning if the patient needs post-hospital services based on physician/advanced practitioner order or complex needs related to functional status, cognitive ability, or social support system  Outcome: Progressing     Problem: Knowledge Deficit  Goal: Patient/family/caregiver demonstrates understanding of disease process, treatment plan, medications, and discharge instructions  Description: Complete learning assessment and assess knowledge base    Interventions:  - Provide teaching at level of understanding  - Provide teaching via preferred learning methods  Outcome: Progressing     Problem: SKIN/TISSUE INTEGRITY - ADULT  Goal: Skin Integrity remains intact(Skin Breakdown Prevention)  Description: Assess:  -Perform Michael assessment every shift  -Clean and moisturize skin every shift  -Inspect skin when repositioning, toileting, and assisting with ADLS    Bed Management:  -Have minimal linens on bed & keep smooth, unwrinkled  -Change linens as needed when moist or perspiring  -Avoid sitting or lying in one position for more than 2 hours while in bed  -Keep HOB at 45 degrees     Toileting:  -Offer bedside commode  -Assess for incontinence every shift    Activity:  -Mobilize patient 3 times a day  -Encourage activity and walks on unit  -Encourage or provide ROM exercises   -Turn and reposition patient every 2 Hours  -Use appropriate equipment to lift or move patient in bed    Skin Care:  -Avoid use of baby powder, tape, friction and shearing, hot water or constrictive clothing  -Relieve pressure over bony prominences using pillows  -Do not massage red bony areas    Outcome: Progressing  Goal: Incision(s), wounds(s) or drain site(s) healing without S/S of infection  Description: INTERVENTIONS  - Assess and document dressing, incision, wound bed, drain sites and surrounding tissue  - Provide patient and family education  - Perform skin care/dressing changes every shift  Outcome: Progressing  Goal: Pressure injury heals and does not worsen  Description: Interventions:  - Implement low air loss mattress or specialty surface (Criteria met)  - Apply silicone foam dressing  - Instruct/assist with weight shifting every 120 minutes when in chair   - Limit chair time to 2 hour intervals     Problem: Prexisting or High Potential for Compromised Skin Integrity  Goal: Skin integrity is maintained or improved  Description: INTERVENTIONS:  - Identify patients at risk for skin breakdown  - Assess and monitor skin integrity  - Assess and monitor nutrition and hydration status  - Monitor labs   - Assess for incontinence   - Turn and reposition patient  - Assist with mobility/ambulation  - Relieve pressure over bony prominences  - Avoid friction and shearing  - Provide appropriate hygiene as needed including keeping skin clean and dry  - Evaluate need for skin moisturizer/barrier cream  - Collaborate with interdisciplinary team   - Patient/family teaching  - Consider wound care consult   Outcome: Progressing     Problem: MOBILITY - ADULT  Goal: Maintain or return to baseline ADL function  Description: INTERVENTIONS:  -  Assess patient's ability to carry out ADLs; assess patient's baseline for ADL function and identify physical deficits which impact ability to perform ADLs (bathing, care of mouth/teeth, toileting, grooming, dressing, etc )  - Assess/evaluate cause of self-care deficits   - Assess range of motion  - Assess patient's mobility; develop plan if impaired  - Assess patient's need for assistive devices and provide as appropriate  - Encourage maximum independence but intervene and supervise when necessary  - Involve family in performance of ADLs  - Assess for home care needs following discharge   - Consider OT consult to assist with ADL evaluation and planning for discharge  - Provide patient education as appropriate  Outcome: Progressing  Goal: Maintains/Returns to pre admission functional level  Description: INTERVENTIONS:  - Perform BMAT or MOVE assessment daily    - Set and communicate daily mobility goal to care team and patient/family/caregiver  - Collaborate with rehabilitation services on mobility goals if consulted  - Perform Range of Motion 3 times a day  - Reposition patient every 2 hours  - Dangle patient 3 times a day  - Stand patient 3 times a day  - Ambulate patient 3 times a day  - Out of bed to chair 3 times a day   - Out of bed for meals 3 times a day  - Out of bed for toileting  - Record patient progress and toleration of activity level   Outcome: Progressing     Problem: Nutrition/Hydration-ADULT  Goal: Nutrient/Hydration intake appropriate for improving, restoring or maintaining nutritional needs  Description: Monitor and assess patient's nutrition/hydration status for malnutrition  Collaborate with interdisciplinary team and initiate plan and interventions as ordered  Monitor patient's weight and dietary intake as ordered or per policy  Utilize nutrition screening tool and intervene as necessary  Determine patient's food preferences and provide high-protein, high-caloric foods as appropriate       INTERVENTIONS:  - Monitor oral intake, urinary output, labs, and treatment plans  - Assess nutrition and hydration status and recommend course of action  - Evaluate amount of meals eaten  - Assist patient with eating if necessary   - Allow adequate time for meals  - Recommend/ encourage appropriate diets, oral nutritional supplements, and vitamin/mineral supplements  - Order, calculate, and assess calorie counts as needed  - Recommend, monitor, and adjust tube feedings and TPN/PPN based on assessed needs  - Assess need for intravenous fluids  - Provide specific nutrition/hydration education as appropriate  - Include patient/family/caregiver in decisions related to nutrition  Outcome: Progressing

## 2023-02-23 LAB
ANION GAP SERPL CALCULATED.3IONS-SCNC: 11 MMOL/L (ref 4–13)
BASOPHILS # BLD AUTO: 0.06 THOUSANDS/ÂΜL (ref 0–0.1)
BASOPHILS NFR BLD AUTO: 1 % (ref 0–1)
BUN SERPL-MCNC: 16 MG/DL (ref 5–25)
CALCIUM SERPL-MCNC: 7.7 MG/DL (ref 8.3–10.1)
CHLORIDE SERPL-SCNC: 101 MMOL/L (ref 96–108)
CO2 SERPL-SCNC: 20 MMOL/L (ref 21–32)
CREAT SERPL-MCNC: 1.45 MG/DL (ref 0.6–1.3)
EOSINOPHIL # BLD AUTO: 0 THOUSAND/ÂΜL (ref 0–0.61)
EOSINOPHIL NFR BLD AUTO: 0 % (ref 0–6)
ERYTHROCYTE [DISTWIDTH] IN BLOOD BY AUTOMATED COUNT: 16.8 % (ref 11.6–15.1)
GFR SERPL CREATININE-BSD FRML MDRD: 52 ML/MIN/1.73SQ M
GLUCOSE SERPL-MCNC: 101 MG/DL (ref 65–140)
GLUCOSE SERPL-MCNC: 118 MG/DL (ref 65–140)
GLUCOSE SERPL-MCNC: 121 MG/DL (ref 65–140)
GLUCOSE SERPL-MCNC: 174 MG/DL (ref 65–140)
GLUCOSE SERPL-MCNC: 268 MG/DL (ref 65–140)
HCT VFR BLD AUTO: 25.1 % (ref 36.5–49.3)
HGB BLD-MCNC: 8.1 G/DL (ref 12–17)
IMM GRANULOCYTES # BLD AUTO: 0.14 THOUSAND/UL (ref 0–0.2)
IMM GRANULOCYTES NFR BLD AUTO: 1 % (ref 0–2)
LYMPHOCYTES # BLD AUTO: 1.5 THOUSANDS/ÂΜL (ref 0.6–4.47)
LYMPHOCYTES NFR BLD AUTO: 13 % (ref 14–44)
MCH RBC QN AUTO: 26.1 PG (ref 26.8–34.3)
MCHC RBC AUTO-ENTMCNC: 32.3 G/DL (ref 31.4–37.4)
MCV RBC AUTO: 81 FL (ref 82–98)
MONOCYTES # BLD AUTO: 1.35 THOUSAND/ÂΜL (ref 0.17–1.22)
MONOCYTES NFR BLD AUTO: 11 % (ref 4–12)
NEUTROPHILS # BLD AUTO: 8.89 THOUSANDS/ÂΜL (ref 1.85–7.62)
NEUTS SEG NFR BLD AUTO: 74 % (ref 43–75)
NRBC BLD AUTO-RTO: 0 /100 WBCS
PLATELET # BLD AUTO: 316 THOUSANDS/UL (ref 149–390)
PMV BLD AUTO: 9.9 FL (ref 8.9–12.7)
POTASSIUM SERPL-SCNC: 3.9 MMOL/L (ref 3.5–5.3)
RBC # BLD AUTO: 3.1 MILLION/UL (ref 3.88–5.62)
SODIUM SERPL-SCNC: 132 MMOL/L (ref 135–147)
WBC # BLD AUTO: 11.94 THOUSAND/UL (ref 4.31–10.16)

## 2023-02-23 PROCEDURE — 0F24X0Z CHANGE DRAINAGE DEVICE IN GALLBLADDER, EXTERNAL APPROACH: ICD-10-PCS | Performed by: STUDENT IN AN ORGANIZED HEALTH CARE EDUCATION/TRAINING PROGRAM

## 2023-02-23 RX ADMIN — INSULIN GLARGINE 5 UNITS: 100 INJECTION, SOLUTION SUBCUTANEOUS at 21:50

## 2023-02-23 RX ADMIN — INSULIN LISPRO 4 UNITS: 100 INJECTION, SOLUTION INTRAVENOUS; SUBCUTANEOUS at 11:55

## 2023-02-23 RX ADMIN — PANTOPRAZOLE SODIUM 40 MG: 40 TABLET, DELAYED RELEASE ORAL at 17:29

## 2023-02-23 RX ADMIN — SIMETHICONE 80 MG: 80 TABLET, CHEWABLE ORAL at 08:49

## 2023-02-23 RX ADMIN — INSULIN LISPRO 1 UNITS: 100 INJECTION, SOLUTION INTRAVENOUS; SUBCUTANEOUS at 17:00

## 2023-02-23 RX ADMIN — SODIUM CHLORIDE, SODIUM GLUCONATE, SODIUM ACETATE, POTASSIUM CHLORIDE, MAGNESIUM CHLORIDE, SODIUM PHOSPHATE, DIBASIC, AND POTASSIUM PHOSPHATE 100 ML/HR: .53; .5; .37; .037; .03; .012; .00082 INJECTION, SOLUTION INTRAVENOUS at 17:20

## 2023-02-23 RX ADMIN — PANTOPRAZOLE SODIUM 40 MG: 40 TABLET, DELAYED RELEASE ORAL at 08:49

## 2023-02-23 RX ADMIN — DULOXETINE HYDROCHLORIDE 30 MG: 30 CAPSULE, DELAYED RELEASE ORAL at 08:49

## 2023-02-23 RX ADMIN — SIMETHICONE 80 MG: 80 TABLET, CHEWABLE ORAL at 17:29

## 2023-02-23 RX ADMIN — INSULIN LISPRO 4 UNITS: 100 INJECTION, SOLUTION INTRAVENOUS; SUBCUTANEOUS at 17:30

## 2023-02-23 RX ADMIN — TAMSULOSIN HYDROCHLORIDE 0.4 MG: 0.4 CAPSULE ORAL at 17:30

## 2023-02-23 RX ADMIN — OXYCODONE HYDROCHLORIDE 5 MG: 5 TABLET ORAL at 21:54

## 2023-02-23 RX ADMIN — ATORVASTATIN CALCIUM 40 MG: 40 TABLET, FILM COATED ORAL at 08:49

## 2023-02-23 RX ADMIN — SIMETHICONE 80 MG: 80 TABLET, CHEWABLE ORAL at 21:50

## 2023-02-23 RX ADMIN — ASPIRIN 81 MG 81 MG: 81 TABLET ORAL at 08:49

## 2023-02-23 RX ADMIN — SIMETHICONE 80 MG: 80 TABLET, CHEWABLE ORAL at 11:55

## 2023-02-23 RX ADMIN — AMLODIPINE BESYLATE 2.5 MG: 2.5 TABLET ORAL at 08:49

## 2023-02-23 RX ADMIN — Medication 6 MG: at 21:50

## 2023-02-23 NOTE — PROGRESS NOTES
Progress Note - Infectious Disease   Catracho Ledesma 61 y o  male MRN: [id number]  Unit/Bed#: -01 Encounter: [id number]      Impression/Plan:  1   Systemic inflammatory response syndrome   Leukocytosis and tachycardia   Present on admission   Possible sepsis without a well-defined source   The patient CT chest abdomen and pelvis does not reveal any new source of sepsis   Urinalysis nondiagnostic for UTI   The abdominal wound has been debrided   Unclear significance of the extremely high alkaline phosphatase level with the total bilirubin normal and the remainder of the transaminases not proportionally high   Consideration for the possibility of bacteremia   Right upper quadrant ultrasound without biliary ductal dilatation   Wound culture with ESBL and MRSA   No recurrence of the fever and the white cell count has come down substantially   Patient completed more than a week of treatment for the ESBL and MRSA  No recurrence of the low-grade fever and the white cell count has decreased without intervention  Repeat CT of the abdomen pelvis with incompletely decompressed gallbladder and increasing pleural effusion on the right  Repeat blood cultures negative thus far  -No additional antibiotics for now  -Surgery follow-up  -Source control measures as below  -Supportive care  -Follow-up repeat blood cultures  -Consult interventional radiology for revision of the biliary drain and possible thoracentesis     2   Intra-abdominal abscesses   Status post IR drainage with drains remaining in place but with improved findings on repeat CT scan   Patient completed several weeks of intravenous antibiotics   No evidence of a new abscess   -No additional antibiotics for now  -Surgery follow-up  -Serial exams     3   ESBL E  coli bacteremia/abscess formation   Status post prolonged course of intravenous antibiotics with clearance of the bacteremia   Abscess is improved as above as per repeat imaging  -No directed antibiotics for this problem  -Drain management     4   Abdominal wound   No overt cellulitis or purulence seen although there is some exudate   Possible mild wound infection that is status post debridement by surgery   ESBL and MRSA   Status post more than a week of IV antibiotics  -Local wound care  -Serial abdominal exams  -Surgery follow-up  -Antibiotics as above     5   Metastatic colon cancer   Status post extensive surgical resection with reexploration in the setting of complication  Now with increasing liver mets  -Reconsult hematology oncology     6   Acute kidney injury   Suspect secondary to prerenal issues   No other clear source appreciated   Renal function improving and now stabilized   Renal function waxing and waning but is relatively stable  -Volume management    Discussed the above management plan with the primary service and they agree with the plan    Antibiotics:  Status post 10 days of antibiotics  Off antibiotics 7    Subjective:  Patient has no fever, chills, sweats; no nausea, vomiting, diarrhea; no cough, shortness of breath; no increased pain  No new symptoms  Having some pain with deep inspiration  Objective:  Vitals:  Temp:  [98 °F (36 7 °C)-99 2 °F (37 3 °C)] 98 6 °F (37 °C)  HR:  [] 79  Resp:  [18-20] 20  BP: (109-139)/(74-79) 139/76  SpO2:  [96 %-99 %] 96 %  Temp (24hrs), Av 5 °F (36 9 °C), Min:98 °F (36 7 °C), Max:99 2 °F (37 3 °C)  Current: Temperature: 98 6 °F (37 °C)    Physical Exam:   General Appearance:  Alert, interactive, nontoxic, no acute distress  Throat: Oropharynx moist without lesions  Lungs:   Clear to auscultation bilaterally; no wheezes, rhonchi or rales; respirations unlabored   Heart:  RRR; no murmur, rub or gallop   Abdomen:   Soft, non-tender, non-distended, positive bowel sounds  Biliary drain in place  VICTORINO drain in place  Ostomy with output  Extremities: No clubbing, cyanosis or edema   Skin: No new rashes or lesions   No draining wounds noted  Labs, Imaging, & Other studies:   All pertinent labs and imaging studies were personally reviewed  Results from last 7 days   Lab Units 02/23/23  0520 02/22/23  0458 02/21/23  0607   WBC Thousand/uL 11 94* 15 26* 11 26*   HEMOGLOBIN g/dL 8 1* 8 0* 7 5*   PLATELETS Thousands/uL 316 362 305     Results from last 7 days   Lab Units 02/23/23  0520 02/22/23  0458 02/21/23  0607   SODIUM mmol/L 132* 132* 135   POTASSIUM mmol/L 3 9 3 8 3 8   CHLORIDE mmol/L 101 102 105   CO2 mmol/L 20* 20* 21   BUN mg/dL 16 15 13   CREATININE mg/dL 1 45* 1 45* 1 33*   EGFR ml/min/1 73sq m 52 52 58   CALCIUM mg/dL 7 7* 7 7* 7 5*     Results from last 7 days   Lab Units 02/22/23  0959   BLOOD CULTURE  Received in Microbiology Lab  Culture in Progress  Received in Microbiology Lab  Culture in Progress  CT abdomen pelvis- increased moderate right and small left pleural effusions  Increased metastatic disease  Gallbladder with some thickening and not completely decompressed

## 2023-02-23 NOTE — PLAN OF CARE
Problem: PHYSICAL THERAPY ADULT  Goal: Performs mobility at highest level of function for planned discharge setting  See evaluation for individualized goals  Description: Treatment/Interventions: Functional transfer training, LE strengthening/ROM, Therapeutic exercise, Endurance training, Patient/family training, Bed mobility, Gait training, Spoke to nursing, OT, Family  Equipment Recommended: Richie Terrazas       See flowsheet documentation for full assessment, interventions and recommendations  Outcome: Progressing  Note: Prognosis: Good  Problem List: Decreased strength, Decreased endurance, Impaired balance, Decreased mobility, Decreased safety awareness, Pain  Assessment: Pt seen for PT treatment session this date with interventions consisting of gait training w/ emphasis on improving pt's ability to ambulate level surfaces x 40' with min A provided by therapist with RW, Therapeutic exercise consisting of: BLE exercises seated at EOB, therapeutic activity consisting of training: bed mobility, supine<>sit transfers, sit<>stand transfers and vc and tactile cues for static standing posture faciliation and navigating 5 stairs w/ bilateral handrail with step to pattern with min A  Pt agreeable to PT treatment session upon arrival, pt found supine in bed w/ HOB elevated, in no apparent distress and responsive  In comparison to previous session, pt with improvements in dynamic balance and endurance  Post session: pt returned back to recliner, all needs in reach and RN notified of session findings/recommendations  Continue to recommend post acute rehabilitation services at time of d/c in order to maximize pt's functional independence and safety w/ mobility   Pt continues to be functioning below baseline level, and remains limited 2* factors listed above and including continued need for medical management and monitoring, decreased strength and balance resulting in an increased risk for falls, decreased endurance and activity tolerance limiting overall mobility  PT will continue to see pt during current hospitalization in order to address the deficits listed above and provide interventions consistent w/ POC in effort to achieve STGs  Barriers to Discharge: Inaccessible home environment, Other (Comment) (decline in functional status)     PT Discharge Recommendation: Post acute rehabilitation services    See flowsheet documentation for full assessment

## 2023-02-23 NOTE — ASSESSMENT & PLAN NOTE
Recent Labs     02/21/23  0607 02/22/23  0458 02/23/23  0520   HGB 7 5* 8 0* 8 1*     · 2/16 received BT 1u PRBC  · No overt bleeding noted  · Monitor CBC, transfuse as needed

## 2023-02-23 NOTE — PLAN OF CARE
Problem: PAIN - ADULT  Goal: Verbalizes/displays adequate comfort level or baseline comfort level  Description: Interventions:  - Encourage patient to monitor pain and request assistance  - Assess pain using appropriate pain scale  - Administer analgesics based on type and severity of pain and evaluate response  - Implement non-pharmacological measures as appropriate and evaluate response  - Consider cultural and social influences on pain and pain management  - Notify physician/advanced practitioner if interventions unsuccessful or patient reports new pain  Outcome: Progressing     Problem: INFECTION - ADULT  Goal: Absence or prevention of progression during hospitalization  Description: INTERVENTIONS:  - Assess and monitor for signs and symptoms of infection  - Monitor lab/diagnostic results  - Monitor all insertion sites, i e  indwelling lines, tubes, and drains  - Monitor endotracheal if appropriate and nasal secretions for changes in amount and color  - [employer] appropriate cooling/warming therapies per order  - Administer medications as ordered  - Instruct and encourage patient and family to use good hand hygiene technique  - Identify and instruct in appropriate isolation precautions for identified infection/condition  Outcome: Progressing  Goal: Absence of fever/infection during neutropenic period  Description: INTERVENTIONS:  - Monitor WBC    Outcome: Progressing     Problem: SAFETY ADULT  Goal: Patient will remain free of falls  Description: INTERVENTIONS:  - Educate patient/family on patient safety including physical limitations  - Instruct patient to call for assistance with activity   - Consult OT/PT to assist with strengthening/mobility   - Keep Call bell within reach  - Keep bed low and locked with side rails adjusted as appropriate  - Keep care items and personal belongings within reach  - Initiate and maintain comfort rounds  - Make Fall Risk Sign visible to staff  - Offer Toileting every  Hours, in advance of need  - Initiate/Maintain alarm  - Obtain necessary fall risk management equipment:   - Apply yellow socks and bracelet for high fall risk patients  - Consider moving patient to room near nurses station  Outcome: Progressing  Goal: Maintain or return to baseline ADL function  Description: INTERVENTIONS:  -  Assess patient's ability to carry out ADLs; assess patient's baseline for ADL function and identify physical deficits which impact ability to perform ADLs (bathing, care of mouth/teeth, toileting, grooming, dressing, etc )  - Assess/evaluate cause of self-care deficits   - Assess range of motion  - Assess patient's mobility; develop plan if impaired  - Assess patient's need for assistive devices and provide as appropriate  - Encourage maximum independence but intervene and supervise when necessary  - Involve family in performance of ADLs  - Assess for home care needs following discharge   - Consider OT consult to assist with ADL evaluation and planning for discharge  - Provide patient education as appropriate  Outcome: Progressing  Goal: Maintains/Returns to pre admission functional level  Description: INTERVENTIONS:  - Perform BMAT or MOVE assessment daily    - Set and communicate daily mobility goal to care team and patient/family/caregiver  - Collaborate with rehabilitation services on mobility goals if consulted  - Perform Range of Motion  times a day  - Reposition patient every  hours    - Dangle patient  times a day  - Stand patient  times a day  - Ambulate patient  times a day  - Out of bed to chair  times a day   - Out of bed for meals  times a day  - Out of bed for toileting  - Record patient progress and toleration of activity level   Outcome: Progressing     Problem: DISCHARGE PLANNING  Goal: Discharge to home or other facility with appropriate resources  Description: INTERVENTIONS:  - Identify barriers to discharge w/patient and caregiver  - Arrange for needed discharge resources and transportation as appropriate  - Identify discharge learning needs (meds, wound care, etc )  - Arrange for interpretive services to assist at discharge as needed  - Refer to Case Management Department for coordinating discharge planning if the patient needs post-hospital services based on physician/advanced practitioner order or complex needs related to functional status, cognitive ability, or social support system  Outcome: Progressing     Problem: Knowledge Deficit  Goal: Patient/family/caregiver demonstrates understanding of disease process, treatment plan, medications, and discharge instructions  Description: Complete learning assessment and assess knowledge base    Interventions:  - Provide teaching at level of understanding  - Provide teaching via preferred learning methods  Outcome: Progressing     Problem: SKIN/TISSUE INTEGRITY - ADULT  Goal: Skin Integrity remains intact(Skin Breakdown Prevention)  Description: Assess:  -Perform Michael assessment every   -Clean and moisturize skin every   -Inspect skin when repositioning, toileting, and assisting with ADLS  -Assess under medical devices such as  every   -Assess extremities for adequate circulation and sensation     Bed Management:  -Have minimal linens on bed & keep smooth, unwrinkled  -Change linens as needed when moist or perspiring  -Avoid sitting or lying in one position for more than  hours while in bed  -Keep HOB at degrees     Toileting:  -Offer bedside commode  -Assess for incontinence every   -Use incontinent care products after each incontinent episode such as     Activity:  -Mobilize patient  times a day  -Encourage activity and walks on unit  -Encourage or provide ROM exercises   -Turn and reposition patient every  Hours  -Use appropriate equipment to lift or move patient in bed  -Instruct/ Assist with weight shifting every  when out of bed in chair  -Consider limitation of chair time  hour intervals    Skin Care:  -Avoid use of baby powder, tape, friction and shearing, hot water or constrictive clothing  -Relieve pressure over bony prominences using   -Do not massage red bony areas    Next Steps:  -Teach patient strategies to minimize risks such as    -Consider consults to  interdisciplinary teams such as   Outcome: Progressing  Goal: Incision(s), wounds(s) or drain site(s) healing without S/S of infection  Description: INTERVENTIONS  - Assess and document dressing, incision, wound bed, drain sites and surrounding tissue  - Provide patient and family education  - Perform skin care/dressing changes every   Outcome: Progressing  Goal: Pressure injury heals and does not worsen  Description: Interventions:  - Implement low air loss mattress or specialty surface (Criteria met)  - Apply silicone foam dressing  - Instruct/assist with weight shifting every  minutes when in chair   - Limit chair time to  hour intervals  - Use special pressure reducing interventions such as  when in chair   - Apply fecal or urinary incontinence containment device   - Perform passive or active ROM every   - Turn and reposition patient & offload bony prominences every  hours   - Utilize friction reducing device or surface for transfers   - Consider consults to  interdisciplinary teams such as   - Use incontinent care products after each incontinent episode such as   - Consider nutrition services referral as needed  Outcome: Progressing     Problem: Prexisting or High Potential for Compromised Skin Integrity  Goal: Skin integrity is maintained or improved  Description: INTERVENTIONS:  - Identify patients at risk for skin breakdown  - Assess and monitor skin integrity  - Assess and monitor nutrition and hydration status  - Monitor labs   - Assess for incontinence   - Turn and reposition patient  - Assist with mobility/ambulation  - Relieve pressure over bony prominences  - Avoid friction and shearing  - Provide appropriate hygiene as needed including keeping skin clean and dry  - Evaluate need for skin moisturizer/barrier cream  - Collaborate with interdisciplinary team   - Patient/family teaching  - Consider wound care consult   Outcome: Progressing     Problem: MOBILITY - ADULT  Goal: Maintain or return to baseline ADL function  Description: INTERVENTIONS:  -  Assess patient's ability to carry out ADLs; assess patient's baseline for ADL function and identify physical deficits which impact ability to perform ADLs (bathing, care of mouth/teeth, toileting, grooming, dressing, etc )  - Assess/evaluate cause of self-care deficits   - Assess range of motion  - Assess patient's mobility; develop plan if impaired  - Assess patient's need for assistive devices and provide as appropriate  - Encourage maximum independence but intervene and supervise when necessary  - Involve family in performance of ADLs  - Assess for home care needs following discharge   - Consider OT consult to assist with ADL evaluation and planning for discharge  - Provide patient education as appropriate  Outcome: Progressing  Goal: Maintains/Returns to pre admission functional level  Description: INTERVENTIONS:  - Perform BMAT or MOVE assessment daily    - Set and communicate daily mobility goal to care team and patient/family/caregiver  - Collaborate with rehabilitation services on mobility goals if consulted  - Perform Range of Motion  times a day  - Reposition patient every  hours  - Dangle patient  times a day  - Stand patient  times a day  - Ambulate patient  times a day  - Out of bed to chair  times a day   - Out of bed for meals times a day  - Out of bed for toileting  - Record patient progress and toleration of activity level   Outcome: Progressing     Problem: Nutrition/Hydration-ADULT  Goal: Nutrient/Hydration intake appropriate for improving, restoring or maintaining nutritional needs  Description: Monitor and assess patient's nutrition/hydration status for malnutrition   Collaborate with interdisciplinary team and initiate plan and interventions as ordered  Monitor patient's weight and dietary intake as ordered or per policy  Utilize nutrition screening tool and intervene as necessary  Determine patient's food preferences and provide high-protein, high-caloric foods as appropriate       INTERVENTIONS:  - Monitor oral intake, urinary output, labs, and treatment plans  - Assess nutrition and hydration status and recommend course of action  - Evaluate amount of meals eaten  - Assist patient with eating if necessary   - Allow adequate time for meals  - Recommend/ encourage appropriate diets, oral nutritional supplements, and vitamin/mineral supplements  - Order, calculate, and assess calorie counts as needed  - Recommend, monitor, and adjust tube feedings and TPN/PPN based on assessed needs  - Assess need for intravenous fluids  - Provide specific nutrition/hydration education as appropriate  - Include patient/family/caregiver in decisions related to nutrition  Outcome: Progressing

## 2023-02-23 NOTE — PHYSICAL THERAPY NOTE
Physical Therapy Treatment Note    Patient's Name: Celena Herrera    Admitting Diagnosis  Abdominal pain [R10 9]  Wound infection [T14  8XXA, L08 9]  Acute kidney injury ([facility] [address] ) [N17 9]  Sepsis, due to unspecified organism, unspecified whether acute organ dysfunction present [facility]) [A41 9]    Problem List  Patient Active Problem List   Diagnosis   • Type 2 diabetes mellitus without complication, with long-term current use of insulin ([facility] [address] )   • Benign essential hypertension   • Mixed hyperlipidemia   • Erectile dysfunction   • Low testosterone   • Obesity (BMI 30-39  9)   • Testicular hypogonadism   • Incarcerated umbilical hernia   • Left ureteral calculus   • Type 2 diabetes mellitus with hyperlipidemia (HCC)   • Anemia   • Hypokalemia   • Transaminitis   • Thrombocytosis   • Iron deficiency anemia, unspecified   • Malignant neoplasm of transverse colon (HCC)   • 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)   • Colon cancer metastasized to liver [facility])   • Colostomy prolapse (HCC)   • Other fatigue   • Cervical radiculopathy   • Encephalopathy   • MR (mitral regurgitation)   • ESBL (extended spectrum beta-lactamase) producing bacteria infection   • Severe protein-calorie malnutrition (HCC)   • Acute on chronic kidney failure (HCC)   • Sepsis (HCC)   • Abscess   • Acute pain   • Leukocytosis   • Dehiscence of incision   • Elevated alkaline phosphatase level   • Abnormal CT scan   • Nephrolithiasis        02/23/23 1011   PT Last Visit   PT Visit Date 02/23/23   Note Type   Note Type Treatment   Pain Assessment   Pain Assessment Tool 0-10   Pain Score No Pain   Restrictions/Precautions   Weight Bearing Precautions Per Order No   Braces or Orthoses Other (Comment)  (none reported)   Other Precautions Fall Risk;Pain;Multiple lines   General   Chart Reviewed Yes   Response to Previous Treatment Patient with no complaints from previous session     Family/Caregiver Present No   Cognition   Overall Cognitive Status [facility] Arousal/Participation Alert; Cooperative   Attention Within functional limits   Orientation Level Oriented X4   Memory Within functional limits   Following Commands Follows all commands and directions without difficulty   Comments Pt agreeable to PT   Bed Mobility   Supine to Sit 5  Supervision   Additional items Assist x 1;Bedrails;HOB elevated; Increased time required;Verbal cues   Additional Comments Pt received supine in bed with HOB elevated  Following session, pt remained in recliner with needs met, call bell within reach   Transfers   Sit to Stand 4  Minimal assistance   Additional items Assist x 1; Armrests; Increased time required;Verbal cues   Stand to Sit 4  Minimal assistance   Additional items Assist x 1; Armrests; Increased time required;Verbal cues   Additional Comments with use of RW, VCs for hand placements   Ambulation/Elevation   Gait pattern Decreased foot clearance; Inconsistent farhan; Short stride;Decreased heel strike   Gait Assistance 4  Minimal assist  (CGA)   Additional items Assist x 1;Verbal cues   Assistive Device Rolling walker   Distance 40'   Stair Management Assistance 4  Minimal assist   Additional items Assist x 1;Verbal cues   Stair Management Technique With walker; Foreward;Backward  (6in practice step)   Number of Stairs 5   Balance   Static Sitting Fair +   Dynamic Sitting Fair   Static Standing Fair -   Dynamic Standing Fair -   Ambulatory Fair -   Endurance Deficit   Endurance Deficit Yes   Endurance Deficit Description decreased activity tolerance   Activity Tolerance   Activity Tolerance Patient limited by fatigue   Nurse Made Aware DAKSHA Vidal   Exercises   Hip Flexion Sitting;10 reps;AROM; Bilateral   Knee AROM Long Arc Quad Sitting;10 reps;AROM; Bilateral   Ankle Pumps Sitting;20 reps;AROM; Bilateral   Assessment   Prognosis Good   Problem List Decreased strength;Decreased endurance; Impaired balance;Decreased mobility; Decreased safety awareness;Pain   Assessment Pt seen for PT treatment session this date with interventions consisting of gait training w/ emphasis on improving pt's ability to ambulate level surfaces x 40' with min A provided by therapist with RW, Therapeutic exercise consisting of: BLE exercises seated at EOB, therapeutic activity consisting of training: bed mobility, supine<>sit transfers, sit<>stand transfers and vc and tactile cues for static standing posture faciliation and navigating 5 stairs w/ bilateral handrail with step to pattern with min A  Pt agreeable to PT treatment session upon arrival, pt found supine in bed w/ HOB elevated, in no apparent distress and responsive  In comparison to previous session, pt with improvements in dynamic balance and endurance  Post session: pt returned back to recliner, all needs in reach and RN notified of session findings/recommendations  Continue to recommend post acute rehabilitation services at time of d/c in order to maximize pt's functional independence and safety w/ mobility  Pt continues to be functioning below baseline level, and remains limited 2* factors listed above and including continued need for medical management and monitoring, decreased strength and balance resulting in an increased risk for falls, decreased endurance and activity tolerance limiting overall mobility  PT will continue to see pt during current hospitalization in order to address the deficits listed above and provide interventions consistent w/ POC in effort to achieve STGs  Barriers to Discharge Inaccessible home environment; Other (Comment)  (decline in functional status)   Goals   STG Expiration Date 3/5/23  In 10 days: Perform all bed mobility tasks modified independent to decrease caregiver burden, Perform all transfers modified independent to improve independence, Ambulate > 50 ft  with RW modified independent w/o LOB and w/ normalized gait pattern 100% of the time, Increase all balance 1/2 grade to decrease risk for falls and negotiate 8 steps with bilateral handrails with mod I   PT Treatment Day 6   Plan   Treatment/Interventions Functional transfer training;LE strengthening/ROM;ADL retraining; Therapeutic exercise; Endurance training;Bed mobility;Gait training; Compensatory technique education;Spoke to nursing;OT;Patient/family training   Progress Progressing toward goals   PT Frequency 3-5x/wk   Recommendation   PT Discharge Recommendation Post acute rehabilitation services   Equipment Recommended [facility] Recommended Wheeled walker   AM-PAC Basic Mobility Inpatient   Turning in Flat Bed Without Bedrails 3   Lying on Back to Sitting on Edge of Flat Bed Without Bedrails 3   Moving Bed to Chair 3   Standing Up From Chair Using Arms 3   Walk in Room 3   Climb 3-5 Stairs With Railing 2   Basic Mobility Inpatient Raw Score 17   Basic Mobility Standardized Score 39 67   Highest Level Of Mobility   JH-HLM Goal 5: Stand one or more mins   JH-HLM Achieved 7: Walk 25 feet or more   Education   Education Provided Mobility training;Assistive device   Patient Demonstrates acceptance/verbal understanding   End of Consult   Patient Position at End of Consult Bedside chair; All needs within reach;Bed/Chair alarm activated       Sanjiv Young, PT    Time In: 10:11  Time Out: 10:42  Total Time: 31 mins

## 2023-02-23 NOTE — CASE MANAGEMENT
Per CM's request the [facility] date has been changed  Reflected below  CM notified  [address] 49 has received approved authorization from insurance:     Faxed in by Ins       Authorization received for: Acute Rehab  Facility: [facility]   Authorization #: [id number]  Start of Care: 02/24/2023  Next Review Date: 03/01/2023  Submit next review to: Fax  [phone]  Care Manager notified: Noel Parkinson

## 2023-02-23 NOTE — PROGRESS NOTES
[facility]  Progress Note - Marguerite Raw 1965, 61 y o  male MRN: [id number]  Unit/Bed#: -Shawn Encounter: [id number]  Primary Care Provider: Ahsan Angel MD   Date and time admitted to hospital: 2/7/2023  3:01 PM    Nephrolithiasis  [address] revealed 7 mm nonobstructing right intrarenal calculus  No hydronephrosis noted on CT a/p  Asymptomatic at this time  · Flomax  · Intake and output    Abnormal CT scan  Assessment & Plan  CT shows interval decrease in bilateral pleural effusions, however multiple small bilateral lung nodules are seen which are concerning for possible metastatic lung disease  · Patient reports having prior known lung nodules  · Oxygenating appropriately on room air; continue to monitor oxygenation  · Outpatient f/u    Elevated alkaline phosphatase level  Assessment & Plan  Chronically elevated likely in setting of known hepatic metastasis  · RUQ US Decompressed gallbladder around a cholecystostomy tube, limiting evaluation  Hepatomegaly  Heterogeneous echotexture of the liver in this patient with known metastatic disease  7 mm nonobstructing right intrarenal calculus  · Monitor with am CMP    Acute on chronic kidney failure [facility])  Assessment & Plan  Lab Results   Component Value Date    EGFR 52 02/23/2023    EGFR 52 02/22/2023    EGFR 58 02/21/2023    CREATININE 1 45 (H) 02/23/2023    CREATININE 1 45 (H) 02/22/2023    CREATININE 1 33 (H) 02/21/2023     · POA with creatinine 2 52; baseline 1-1 3  · Suspecting possible multifactorial cause in setting of dehydration and sepsis  · Continue with tia IV fluids  · Avoid nephrotoxic agents  · Steadily downtrending    Colon cancer metastasized to liver [facility])  Assessment & Plan  Follows w/ outpatient [facility] onc and palliative care   Extensive abdominal surgical history (11/7 ex lap w/ section 3 liver resection, and development of left upper quadrant hematoma and suspected leak from transverse colon anastomosis complication; 91/04 right hemicolectomy; 11/17 re-exploration w/partial descending colectomy; 12/8 IR percutaneous cholecystostomy tube and hepatectomy abscess drainage; 12/20 IR drains placed for perisplenic abscess)  · Patient does not wish to follow with [facility] [address] oncology anymore as it is too far of a drive; would like to establish with [facility] oncology (already follows with Dr Moon Figueroa)  · See imaging results below  · Oncology consulted, appreciate recommendation  · Continue outpatient f/u    Anemia  Assessment & Plan  Recent Labs     02/21/23  0607 02/22/23  0458 02/23/23  0520   HGB 7 5* 8 0* 8 1*     · 2/16 received BT 1u PRBC  · No overt bleeding noted  · Monitor CBC, transfuse as needed      Benign essential hypertension  Assessment & Plan  BP stable  · Continue prehospital HTN medications  · Monitor BP per unit protocol    Type 2 diabetes mellitus without complication, with long-term current use of insulin ([facility] )  Assessment & Plan    Lab Results   Component Value Date    HGBA1C 8 0 (H) 09/26/2022     · Home Lantus 8U qHS and humalog 4U TID w/meals   · Dose reduce to 5U qhs + 4 U TID   · 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   · Hypoglycemia protocol  · Diabetic diet     * Sepsis ([facility] [address] )  Assessment & Plan  Noted the development of foul-smelling drainage and increasing pain from his chronic abdominal wound  · As evidenced by tachycardia, tachypnea, and leukocytosis  · In setting of purulent abdominal infection   · Hx of colon CA w/ extensive abd surgical hx as below   · Patient has several abdominal drains in place, with CT showing improvement in the fluid collections at the left liver lobe, perisplenic area, and the left retroperitoneum inferior to the spleen  · Completed IV Ertapenem and IV Vancomycin  · ID consulted; recommendations appreciated   · S/p bedside midline wound debridement + wet dressing 2/8 with general surgery  · S/p IR tube check 2/17/2023, drains kept in place   Discussed with IR, follow up in 1 month  · Given ongoing low grade fever and worsening leukocytosis, ordered repeat blood cultures and repeat CT a/p  · CT a/p showing moderate R pleural effusion, imaging concerning for cholecystitis  Discussed case with surgery, IR  IR will reposition drain today  · Currently continue to monitor off abx  · Awaiting placement        VTE Pharmacologic Prophylaxis: VTE Score: 6 Moderate Risk (Score 3-4) - Pharmacological DVT Prophylaxis Ordered: heparin  Patient Centered Rounds: I performed bedside rounds with nursing staff today  Discussions with Specialists or Other Care Team Provider: JIMENA    Education and Discussions with Family / Patient: Updated  (brother) via phone  Total Time Spent on Date of Encounter in care of patient: 55 minutes This time was spent on one or more of the following: performing physical exam; counseling and coordination of care; obtaining or reviewing history; documenting in the medical record; reviewing/ordering tests, medications or procedures; communicating with other healthcare professionals and discussing with patient's family/caregivers  Current Length of Stay: 16 day(s)  Current Patient Status: Inpatient   Certification Statement: The patient will continue to require additional inpatient hospital stay due to IR drain exchange  Discharge Plan: Anticipate discharge later today or tomorrow to rehab facility  Code Status: Level 1 - Full Code    Subjective:   Pt is well overall  His nausea, vomiting, fevers or chills, chest pain, shortness of breath  States abdominal pain is about the same  Denies any right upper quadrant pain  Otherwise ROS negative  Objective:     Vitals:   Temp (24hrs), Av 3 °F (36 8 °C), Min:97 4 °F (36 3 °C), Max:99 2 °F (37 3 °C)    Temp:  [97 4 °F (36 3 °C)-99 2 °F (37 3 °C)] 98 6 °F (37 °C)  HR:  [] 79  Resp:  [18-20] 20  BP: (109-139)/(74-79) 139/76  SpO2:  [96 %-99 %] 96 %  Body mass index is 27 98 kg/m²  Input and Output Summary (last 24 hours): Intake/Output Summary (Last 24 hours) at 2/23/2023 1024  Last data filed at 2/23/2023 0530  Gross per 24 hour   Intake --   Output 1335 ml   Net -1335 ml       Physical Exam:   Physical Exam  Vitals reviewed  Constitutional:       General: He is not in acute distress  Appearance: He is well-developed  He is not diaphoretic  HENT:      Head: Normocephalic and atraumatic  Mouth/Throat:      Pharynx: No oropharyngeal exudate  Eyes:      General: No scleral icterus  Extraocular Movements: Extraocular movements intact  Conjunctiva/sclera: Conjunctivae normal    Neck:      Vascular: No JVD  Trachea: No tracheal deviation  Cardiovascular:      Rate and Rhythm: Normal rate and regular rhythm  Heart sounds: No murmur heard  No friction rub  No gallop  Pulmonary:      Effort: Pulmonary effort is normal  No respiratory distress  Breath sounds: No stridor  No wheezing  Abdominal:      General: There is no distension  Palpations: Abdomen is soft  There is no mass  Tenderness: There is abdominal tenderness  There is no right CVA tenderness, left CVA tenderness, guarding or rebound  Comments: Drain in place   Musculoskeletal:         General: No tenderness  Normal range of motion  Right lower leg: No edema  Left lower leg: No edema  Skin:     General: Skin is warm and dry  Coloration: Skin is not pale  Findings: No erythema  Neurological:      Mental Status: He is alert and oriented to person, place, and time  Psychiatric:         Behavior: Behavior normal          Thought Content:  Thought content normal           Additional Data:     Labs:  Results from last 7 days   Lab Units 02/23/23  0520   WBC Thousand/uL 11 94*   HEMOGLOBIN g/dL 8 1*   HEMATOCRIT % 25 1*   PLATELETS Thousands/uL 316   NEUTROS PCT % 74   LYMPHS PCT % 13*   MONOS PCT % 11   EOS PCT % 0     Results from last 7 days   Lab Units 02/23/23  0520   SODIUM mmol/L 132*   POTASSIUM mmol/L 3 9   CHLORIDE mmol/L 101   CO2 mmol/L 20*   BUN mg/dL 16   CREATININE mg/dL 1 45*   ANION GAP mmol/L 11   CALCIUM mg/dL 7 7*   GLUCOSE RANDOM mg/dL 118         Results from last 7 days   Lab Units 02/23/23  0732 02/22/23  2039 02/22/23  1808 02/22/23  1129 02/22/23  0724 02/21/23  2102 02/21/23  1631 02/21/23  1123 02/21/23  0733 02/20/23  2052 02/20/23  1610 02/20/23  1102   POC GLUCOSE mg/dl 121 194* 188* 199* 122 125 147* 112 81 163* 167* 200*         Results from last 7 days   Lab Units 02/21/23  1022   LACTIC ACID mmol/L 1 3       Lines/Drains:  Invasive Devices     Central Venous Catheter Line  Duration           Port A Cath 03/10/22 Right Chest 349 days          Peripheral Intravenous Line  Duration           Peripheral IV 02/22/23 Distal;Dorsal (posterior); Left Forearm <1 day          Drain  Duration           Ileostomy LUQ 97 days    Cholecystostomy Tube 72 days    Abscess Drain Abdomen 41 days                Central Line:  Goal for removal: Port accessed  Will de-access as appropriate  Imaging: Reviewed radiology reports from this admission including: abdominal/pelvic CT    Recent Cultures (last 7 days):   Results from last 7 days   Lab Units 02/22/23  0959   BLOOD CULTURE  Received in Microbiology Lab  Culture in Progress  Received in Microbiology Lab  Culture in Progress         Last 24 Hours Medication List:   Current Facility-Administered Medications   Medication Dose Route Frequency Provider Last Rate   • acetaminophen  650 mg Oral Q4H PRN Vivienne Correa PA-C     • amLODIPine  2 5 mg Oral Daily Robert Choe     • aspirin  81 mg Oral Daily Robert Choe     • atorvastatin  40 mg Oral Daily Robert Choe     • collagenase   Topical Daily Annabellevirginia Lopez PA-C     • DULoxetine  30 [facility]     • heparin (porcine)  5,000 Units Subcutaneous [facility] Robert Choe     • influenza vaccine  0 5 mL Intramuscular Prior to discharge Vidal Harden MD     • insulin glargine  5 Units Subcutaneous HS Kiara Wang PA-C     • insulin lispro  1-6 Units Subcutaneous TID AC Mallory Leonardo [place] NANETTE sanchez     • insulin lispro  1-6 Units Subcutaneous HS Clearance NANETTE Bautista     • insulin lispro  4 Units Subcutaneous TID With Meals Clearance NANETTE Bautista     • melatonin  6 mg Oral HS PRN Sindy Dumas PA-C     • methocarbamol  500 mg Oral BID PRN Clearance NANETTE Bautista     • multi-electrolyte  100 mL/hr Intravenous Continuous Nelia Kenzie Saraiya,  mL/hr (02/22/23 2117)   • ondansetron  4 mg Intravenous Q4H PRN Nelia Kenzie Saraiya, DO     • oxyCODONE  5 mg Oral Q4H PRN Nelia Kenzie Saraiya, DO     • oxyCODONE  2 5 mg Oral Q4H PRN Nelia Kenzie Saraiya, DO     • pantoprazole  40 mg Oral BID Clearance NANETTE Bautista     • pneumococcal 13-valent conjugate vaccine  0 5 mL Intramuscular Prior to discharge Vidal Harden MD     • simethicone  80 mg Oral 4x Daily (with meals and at bedtime) Clearance NANETTE Bautista     • sodium chloride (PF)  10 mL Intravenous Daily Nleia Kenzie Saraiya, DO     • tamsulosin  0 4 mg Oral Daily With [address] [facility]          Today, Patient Was Seen By: Juan Wells DO    **Please Note: This note may have been constructed using a voice recognition system  **

## 2023-02-23 NOTE — CASE MANAGEMENT
Case Management Progress Note    Patient name Sagrario Norman  Location /-05 MRN [id number]  : 1965 Date 2023       LOS (days): 16  Geometric Mean LOS (GMLOS) (days):   Days to GMLOS:        OBJECTIVE:        Current admission status: Inpatient  Preferred Pharmacy:   [facility]/pharmacy #[id number] ANJELICA [facility]BOS - [address] [place] [address]  Phone: [phone] Fax: 209 [address] [place] [facility] [address] [address]  Phone: [phone] Fax: [address] [address]  Phone: [phone] Fax: [phone]    Primary Care Provider: Barbi Veras MD    Primary Insurance: CIGIN[employer]  Secondary Insurance:     PROGRESS NOTE:    CM informed [facility] liaison that patient is anticipated to be discharged in 24-48 hours  His auth is good until 3/1  Liaison reported that she will have their MD review tomorrow morning and determine when [facility] will have a bed available next  CM department will continue to follow patient through discharge

## 2023-02-23 NOTE — ASSESSMENT & PLAN NOTE
Follows w/ outpatient [facility] onc and palliative care   Extensive abdominal surgical history (11/7 ex lap w/ section 3 liver resection, and development of left upper quadrant hematoma and suspected leak from transverse colon anastomosis complication; 42/49 right hemicolectomy; 11/17 re-exploration w/partial descending colectomy; 12/8 IR percutaneous cholecystostomy tube and hepatectomy abscess drainage; 12/20 IR drains placed for perisplenic abscess)  · Patient does not wish to follow with [facility] oncology anymore as it is too far of a drive; would like to establish with [facility] oncology (already follows with Dr Chirag Dickinson)  · See imaging results below  · Oncology consulted, appreciate recommendation  · Continue outpatient f/u

## 2023-02-23 NOTE — ASSESSMENT & PLAN NOTE
Noted the development of foul-smelling drainage and increasing pain from his chronic abdominal wound  · As evidenced by tachycardia, tachypnea, and leukocytosis  · In setting of purulent abdominal infection   · Hx of colon CA w/ extensive abd surgical hx as below   · Patient has several abdominal drains in place, with CT showing improvement in the fluid collections at the left liver lobe, perisplenic area, and the left retroperitoneum inferior to the spleen  · Completed IV Ertapenem and IV Vancomycin  · ID consulted; recommendations appreciated   · S/p bedside midline wound debridement + wet dressing 2/8 with general surgery  · S/p IR tube check 2/17/2023, drains kept in place  Discussed with IR, follow up in 1 month  · Given ongoing low grade fever and worsening leukocytosis, ordered repeat blood cultures and repeat CT a/p  · CT a/p showing moderate R pleural effusion, imaging concerning for cholecystitis  Discussed case with surgery, IR   IR will reposition drain today  · Currently continue to monitor off abx  · Awaiting placement

## 2023-02-23 NOTE — CONSULTS
Medical Oncology/Hematology Consult Note  Radha Marshall, male, 61 y o , 1965,  /-[birth year], [id number]     Reason for admission: Sepsis  Reason for consultation: Metastatic colon cancer - patient requesting       History of present illness: Patient is a 25-year-old male known to medical oncology for metastatic colon cancer, extensive liver metastasis who presented to emergency department on 2/7/2022 for evaluation of generalized weakness after released from rehab a few days prior to admission calls for evaluation of midline abdominal incision from procedure on 11/7 ex lap, segment 3 liver resection with ablation, 11/16 left upper quadrant infected hematoma with defect of transverse colon anastomosis, massively dilated cecum requiring resection, right hemicolectomy with temporary abdominal closure device placed  11/17 reexploration with partial descending colectomy, primary colonic anastomosis with diverting loop ileostomy and wound VAC placement  Had extensive course of IV antibiotics for ESBL from intra-abdominal abscess, discharged on 12/3 to SNF for rehab, return to hospital day after discharge due to yellow drainage from abdominal incision with CAT scan showing abdominal abscess with distention of gallbladder, patient was continued on IV antibiotics and on 12/08/2022 interventional radiology placed percutaneous cholecystostomy tube and abscess drain and eventually discharged on 12/14/2022 to [facility]  ASSESSMENT AND PLAN:     1  Metastatic Colon Cancer  • Patient to be discharged to rehab at [facility], will arrange outpatient follow up to discuss further treatment options    • No further inpatient needs currently however please see attending attestation for any additional recommendations     2  SEPSIS  3  Intra-abdominal abscesses  4  Abdominal wound  • ESBL, MRSA appreciate ID, SLIM  • Off of abx, doing well continue to monitor     Patient understands and is in agreement with this plan   Thank you for the opportunity to participate in this patient's care  Interval History: Met with patient and wife, patient sitting comfortably in chair  States that he is able to take a few steps on his own without becoming too weak  He has made major improvements through rehabilitation and is excited to return to [facility] for further therapy  Understanding that his cancer has progressed as expected without active antineoplastic therapy, we will readdress with patient his treatment options pending his performance status through rehabilitation and disease state at the time of evaluation  ECOG: 3    Review of Systems:   Review of Systems   Constitutional: Negative for chills and fever  HENT: Negative for ear pain and sore throat  Eyes: Negative for pain and visual disturbance  Respiratory: Negative for cough and shortness of breath  Cardiovascular: Negative for chest pain and palpitations  Gastrointestinal: Negative for blood in stool, constipation, diarrhea, nausea and vomiting  Genitourinary: Negative for dysuria and hematuria  Musculoskeletal: Negative for arthralgias and back pain  Skin: Negative for color change and rash  Neurological: Positive for weakness  Negative for seizures and syncope  All other systems reviewed and are negative  PHYSICAL EXAM:    /76   Pulse 79   Temp 98 6 °F (37 °C)   Resp 20   Ht 5' 10" (1 778 m)   Wt 88 5 kg (195 lb)   SpO2 96%   BMI 27 98 kg/m²     Physical Exam  Vitals reviewed  Constitutional:       General: He is not in acute distress  Appearance: Normal appearance  He is ill-appearing  Eyes:      General: No scleral icterus  Cardiovascular:      Rate and Rhythm: Normal rate  Pulmonary:      Effort: Pulmonary effort is normal  No respiratory distress  Musculoskeletal:      Cervical back: Normal range of motion and neck supple  Right lower leg: No edema  Left lower leg: No edema  Skin:     General: Skin is dry        Coloration: Skin is pale  Findings: No rash  Neurological:      Mental Status: He is alert and oriented to person, place, and time  Psychiatric:         Mood and Affect: Mood normal          Behavior: Behavior normal          Judgment: Judgment normal          LABS:     Recent Results (from the past 48 hour(s))   Fingerstick Glucose (POCT)    Collection Time: 02/21/23  4:31 PM   Result Value Ref Range    POC Glucose 147 (H) 65 - 140 mg/dl   Fingerstick Glucose (POCT)    Collection Time: 02/21/23  9:02 PM   Result Value Ref Range    POC Glucose 125 65 - 140 mg/dl   CBC    Collection Time: 02/22/23  4:58 AM   Result Value Ref Range    WBC 15 26 (H) 4 31 - 10 16 Thousand/uL    RBC 3 11 (L) 3 88 - 5 62 Million/uL    Hemoglobin 8 0 (L) 12 0 - 17 0 g/dL    Hematocrit 25 6 (L) 36 5 - 49 3 %    MCV 82 82 - 98 fL    MCH 25 7 (L) 26 8 - 34 3 pg    MCHC 31 3 (L) 31 4 - 37 4 g/dL    RDW 16 8 (H) 11 6 - 15 1 %    Platelets 307 210 - 201 Thousands/uL    MPV 10 3 8 9 - 12 7 fL   Basic metabolic panel    Collection Time: 02/22/23  4:58 AM   Result Value Ref Range    Sodium 132 (L) 135 - 147 mmol/L    Potassium 3 8 3 5 - 5 3 mmol/L    Chloride 102 96 - 108 mmol/L    CO2 20 (L) 21 - 32 mmol/L    ANION GAP 10 4 - 13 mmol/L    BUN 15 5 - 25 mg/dL    Creatinine 1 45 (H) 0 60 - 1 30 mg/dL    Glucose 120 65 - 140 mg/dL    Calcium 7 7 (L) 8 3 - 10 1 mg/dL    eGFR 52 ml/min/1 73sq m   Fingerstick Glucose (POCT)    Collection Time: 02/22/23  7:24 AM   Result Value Ref Range    POC Glucose 122 65 - 140 mg/dl   Blood culture    Collection Time: 02/22/23  9:59 AM    Specimen: Hand, Right; Blood   Result Value Ref Range    Blood Culture Received in Microbiology Lab  Culture in Progress  Blood culture    Collection Time: 02/22/23  9:59 AM    Specimen: Hand, Left; Blood   Result Value Ref Range    Blood Culture Received in Microbiology Lab  Culture in Progress      Fingerstick Glucose (POCT)    Collection Time: 02/22/23 11:29 AM   Result Value Ref Range    POC Glucose 199 (H) 65 - 140 mg/dl   Fingerstick Glucose (POCT)    Collection Time: 02/22/23  6:08 PM   Result Value Ref Range    POC Glucose 188 (H) 65 - 140 mg/dl   Fingerstick Glucose (POCT)    Collection Time: 02/22/23  8:39 PM   Result Value Ref Range    POC Glucose 194 (H) 65 - 140 mg/dl   CBC and differential    Collection Time: 02/23/23  5:20 AM   Result Value Ref Range    WBC 11 94 (H) 4 31 - 10 16 Thousand/uL    RBC 3 10 (L) 3 88 - 5 62 Million/uL    Hemoglobin 8 1 (L) 12 0 - 17 0 g/dL    Hematocrit 25 1 (L) 36 5 - 49 3 %    MCV 81 (L) 82 - 98 fL    MCH 26 1 (L) 26 8 - 34 3 pg    MCHC 32 3 31 4 - 37 4 g/dL    RDW 16 8 (H) 11 6 - 15 1 %    MPV 9 9 8 9 - 12 7 fL    Platelets 359 129 - 982 Thousands/uL    nRBC 0 /100 WBCs    Neutrophils Relative 74 43 - 75 %    Immat GRANS % 1 0 - 2 %    Lymphocytes Relative 13 (L) 14 - 44 %    Monocytes Relative 11 4 - 12 %    Eosinophils Relative 0 0 - 6 %    Basophils Relative 1 0 - 1 %    Neutrophils Absolute 8 89 (H) 1 85 - 7 62 Thousands/µL    Immature Grans Absolute 0 14 0 00 - 0 20 Thousand/uL    Lymphocytes Absolute 1 50 0 60 - 4 47 Thousands/µL    Monocytes Absolute 1 35 (H) 0 17 - 1 22 Thousand/µL    Eosinophils Absolute 0 00 0 00 - 0 61 Thousand/µL    Basophils Absolute 0 06 0 00 - 0 10 Thousands/µL   Basic metabolic panel    Collection Time: 02/23/23  5:20 AM   Result Value Ref Range    Sodium 132 (L) 135 - 147 mmol/L    Potassium 3 9 3 5 - 5 3 mmol/L    Chloride 101 96 - 108 mmol/L    CO2 20 (L) 21 - 32 mmol/L    ANION GAP 11 4 - 13 mmol/L    BUN 16 5 - 25 mg/dL    Creatinine 1 45 (H) 0 60 - 1 30 mg/dL    Glucose 118 65 - 140 mg/dL    Calcium 7 7 (L) 8 3 - 10 1 mg/dL    eGFR 52 ml/min/1 73sq m   Fingerstick Glucose (POCT)    Collection Time: 02/23/23  7:32 AM   Result Value Ref Range    POC Glucose 121 65 - 140 mg/dl   Fingerstick Glucose (POCT)    Collection Time: 02/23/23 11:17 AM   Result Value Ref Range    POC Glucose 268 (H) 65 - 140 mg/dl       CT chest abdomen pelvis wo contrast    Result Date: 2/7/2023  Narrative: CT CHEST, ABDOMEN AND PELVIS WITHOUT IV CONTRAST INDICATION:   Shortness of breath  Weakness, possible infection  , metastatic colon cancer, status post right hemicolectomy, partial descending colectomy and ileostomy, segment 3 liver resection, status post cholecystostomy tube placement and hepatic abscess drainage COMPARISON:  CT abdomen and pelvis January 2023, December 2022, CT thorax December 2022 TECHNIQUE: CT examination of the chest, abdomen and pelvis was performed without intravenous contrast  Axial, sagittal, and coronal 2D reformatted images were created from the source data and submitted for interpretation  Radiation dose length product (DLP) for this visit:  861 mGy-cm   This examination, like all CT scans performed in the [facility], was performed utilizing techniques to minimize radiation dose exposure, including the use of iterative reconstruction and automated exposure control  Absence of intravenous and oral contrast decreases the sensitivity of the exam   FINDINGS: CHEST LUNGS: 2 mm nodule right upper lobe series 2 image 28, series 2 image 29, series 2/34 and series 2/37 with 2 mm right upper lobe nodule series 2/image 40  3 mm fissural-based nodule right upper lobe series 2/50  4 mm right upper lobe nodule series 2/60 with 2 mm left lower lobe nodule series 2/64 and 3 mm lingular nodule series 2/68  Additional nodules are present in the left lower lobe series 71 and right lower lobe series 2/70  Largest nodule in the right lower lobe measures 6 mm  Development of multiple small lung nodules is highly suspicious for metastasis  Left lower lobe volume loss  There is no tracheal or endobronchial lesion  PLEURA:  Small pleural effusions, decreased compared to the previous exam  HEART/GREAT VESSELS: Right portacatheter  Heart is unremarkable for patient's age  No thoracic aortic aneurysm   MEDIASTINUM AND NITA: Unremarkable  CHEST WALL AND LOWER NECK:  Unremarkable  ABDOMEN LIVER/BILIARY TREE:  There is minimal residual fluid demonstrated in association with left hepatic fluid collection  This has a maximum diameter of 2 5 cm series 2/110 compared to 4 2 cm on the previous exam  Extensive tumor involving the right lobe of the liver is less well-visualized than on the previous exams due to lack of intravenous contrast  GALLBLADDER:  There is no interval change in gallbladder catheter  SPLEEN: Catheter previously terminating along the superior medial aspect of the spleen has been removed and there has also been interval removal of catheter terminating along the inferiomedial aspect of the spleen  Fluid collection previously seen at the level of the lesser sac has resolved  PANCREAS:  Unremarkable  ADRENAL GLANDS:  Unremarkable  KIDNEYS/URETERS:  3 mm nonobstructing left renal calculus is seen with 2 mm right renal calculus and right renal cyst  STOMACH AND BOWEL: Stomach shows normal caliber  Small bowel loops are within normal limits  Patient is status post subtotal colectomy with sutures are demonstrated at the level of the descending colon and sigmoid  Left lower quadrant ostomy is seen  APPENDIX:  Not visualized  ABDOMINOPELVIC CAVITY: 8 mm perihepatic node series 2/98  Fluid collection at the level of the lesser sac has resolved  Previously seen splenic subcapsular fluid collection is no longer visualized  Additional fluid collections at the level of the inferior aspect of the spleen and lateral to the left kidney containing a small amount of air and fluid has slightly improved, series 601 image 86-93  VESSELS:  Atherosclerotic changes are present  No evidence of aneurysm  PELVIS REPRODUCTIVE ORGANS:  Unremarkable for patient's age  URINARY BLADDER:  Unremarkable  ABDOMINAL WALL/INGUINAL REGIONS:  Lipoma of the left upper thigh  OSSEOUS STRUCTURES:  No acute fracture or destructive osseous lesion    Spinal degenerative changes are noted  Severe sclerosis of the sacroiliac joints  Impression: 1  Interval decrease in bilateral pleural effusions with development of multiple small bilateral lung nodules concerning for metastatic lung disease  2   Fluid collection at the level of the left lobe of the liver is smaller with resolution of the other perisplenic fluid collections and improvement in collections in the left retroperitoneum inferior to the spleen  The study was marked in [facility] for immediate notification  Workstation performed: [id number]     US right upper quadrant    Result Date: 2/8/2023  Narrative: RIGHT UPPER QUADRANT ULTRASOUND INDICATION:     Elevated alkaline phosphatase  COMPARISON:  Right upper quadrant ultrasound dated 12/8/2022  TECHNIQUE:   Real-time ultrasound of the right upper quadrant was performed with a curvilinear transducer with both volumetric sweeps and still imaging techniques  FINDINGS: Technically limited examination due to multiple dressings overlying the patient  PANCREAS:  Obscured by overlying bowel gas  AORTA AND IVC:  Visualized portions are normal for patient age  LIVER: Size:  Enlarged  The liver measures 18 9 cm in the midclavicular line  Contour:  Surface contour is smooth  Parenchyma:  Heterogeneous echotexture in this patient with known metastatic disease  No liver mass identified  Limited imaging of the main portal vein shows it to be patent and hepatopetal   BILIARY: The gallbladder is decompressed around a cholecystostomy tube, limiting evaluation  No intrahepatic biliary dilatation  CBD measures 5 0 mm  No choledocholithiasis  KIDNEY: Right kidney measures 10 7 x 4 2 x 4 5 cm  Volume 107 6 mL 7 mm nonobstructing intrarenal calculus in the lower pole  2 1 x 1 3 x 1 2 cm simple cyst in the interpolar region  ASCITES:   None  Impression: Decompressed gallbladder around a cholecystostomy tube, limiting evaluation  Hepatomegaly   Heterogeneous echotexture of the liver in this patient with known metastatic disease  7 mm nonobstructing right intrarenal calculus  Workstation performed: [id number]     CT abdomen pelvis w contrast    Result Date: 2/22/2023  Narrative: CT ABDOMEN AND PELVIS WITH IV CONTRAST INDICATION:   Abdominal abscess/infection suspected Intra-abdominal abscess hx of intra-abdominal abscess, now with low-grade fever and worsening leucocytosis  COMPARISON:  2/7/2023 TECHNIQUE:  CT examination of the abdomen and pelvis was performed  Axial, sagittal, and coronal 2D reformatted images were created from the source data and submitted for interpretation  Radiation dose length product (DLP) for this visit:  1244 mGy-cm   This examination, like all CT scans performed in the [facility], was performed utilizing techniques to minimize radiation dose exposure, including the use of iterative reconstruction and automated exposure control  IV Contrast:  100 mL of iohexol (OMNIPAQUE) Enteric Contrast:  Enteric contrast was not administered  FINDINGS: ABDOMEN LOWER CHEST:  Interval worsening of moderate right and small left pleural effusions  There is associated bibasilar atelectasis  LIVER/BILIARY TREE:  Extensive heterogeneous low density masses throughout the liver are reidentified  It is uncertain whether there has been any interval change since the February 7 noninfused CT scan  However, when compared to the CT scan with contrast from January 11, there appears to be progression of disease  Infection not excluded  There is a persistent percutaneous drain in the left lobe  Minimal residual collection not significantly changed  GALLBLADDER:  There is a percutaneous cholecystostomy tube  The gallbladder itself appears abnormal with wall thickening and inflammatory changes  SPLEEN:  The collection at the inferior aspect of the spleen has decreased in size now measuring 3 8 cm, previously 4 2 cm (2/73)    Redemonstration of a thin amount of fluid adjacent to the medial spleen (2/29)  PANCREAS:  Unremarkable  ADRENAL GLANDS:  Unremarkable  KIDNEYS/URETERS:  Small right renal cyst   No hydronephrosis  STOMACH AND BOWEL:  Subtotal colectomy with left lower quadrant ostomy  No bowel obstruction  APPENDIX:  Not visualized  ABDOMINOPELVIC CAVITY:  No pneumoperitoneum or adenopathy  Scattered nonspecific subcentimeter lymph nodes  Scattered mild foci of fluid in the upper abdomen (2/49)  VESSELS:  Atherosclerotic changes are present  No evidence of aneurysm  PELVIS REPRODUCTIVE ORGANS:  Unremarkable for patient's age  URINARY BLADDER:  Unremarkable  ABDOMINAL WALL/INGUINAL REGIONS:  Left medial thigh lipoma  OSSEOUS STRUCTURES:  No acute fracture or destructive osseous lesion  Spinal degenerative changes are noted  Impression: 1  Interval worsening of moderate right and small left pleural effusions  2   Especially when comparing to 1/11/2023, interval progression/worsening of heterogeneous low-density masses of the liver  Likely metastases  Cannot exclude infection  3   Liver drain and a cystostomy tubes are stable  The gallbladder demonstrates irregular wall thickening/pericholecystic edema consistent with cholecystitis  Workstation performed: [id number]     IR drainage tube check/change/reposition/reinsertion/upsize    Result Date: 2/16/2023  Narrative: PROCEDURE: Cholecystostomy tube and epigastric abscess drainage catheter check Procedural Personnel Attending physician(s): Dr Ethan Vanegas Pre-procedure diagnosis: Cholecystitis Post-procedure diagnosis: Same Indication: Patient with history of colon cancer, multiple abdominal surgeries, cholecystitis status post cholecystostomy tube and multiple abdominal abscess drainage catheters in the past, with one epigastric abscess drain remaining  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 during last drain check and returns for follow-up   PROCEDURE SUMMARY: - Cholecystostomy tube and epigastric abscess drainage catheter check under fluoroscopic guidance PROCEDURE DETAILS: Cholecystostomy and epigastric abscess drainage catheter check Contrast was injected through the cholecystostomy tube and multiple fluoroscopic images were obtained  Contrast was injected through the epigastrium abscess drainage catheter and multiple fluoroscopic images were obtained  Contrast Contrast agent: Omnipaque Contrast volume (mL): 20 Radiation Dose Fluoroscopy time (seconds): 53  Reference air kerma (mGy): 18 782 Kerma area product (Gy-cm2): 4 09 Additional Details Estimated blood loss (mL): None Complications: No immediate complications  Impression: 1  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  2   Epigastric abscess drainage catheter check showed no residual abscess cavity  Plan: - Cholecystostomy tube kept in place to bag gravity drainage  Patient is due for routine catheter exchange in March 2023  - Epigastric abscess drainage catheter kept in place due to persistent purulent output  Workstation performed: [id number]     IR drainage tube check/change/reposition/reinsertion/upsize    Result Date: 2/10/2023  Narrative: Cholecystostomy tube check and Abscess drain check Clinical History: History of metastatic disease status post cholecystostomy tube placement and 3 abdominal drains Contrast: 15 mL Omni 350 Fluoro time: 55 seconds Number of Images: Multiple Technique: The patient was brought to the interventional radiology suite and placed supine on the table  After a  view was obtained, contrast was injected into the percutaneous cholecystostomy catheter and left chest abscess drain, multiple images were obtained  Findings: 1  Cholecystostomy tube check demonstrated appropriate positioning of the tube with the locking pigtail within the lumen of the gallbladder  The cystic duct and the common bile duct were not completely visualized suggestive of obstruction still remains    Small bowel opacification upon injection of the cholecystostomy prior to visualization of the cystic or common duct raises question of a choleenteric fistula  2  Abscess drain check to left chest wall demonstrated a small residual cavity  Patient reports still with significant output from this drain and therefore will leave drain in place  Suture was embedded into superficial skin causing discomfort  The suture was removed and replaced with a StatLock  Impression: Impression: Cholecystostomy is identified within the gallbladder however small bowel opacifies prior to good opacification of the cystic and common ducts raising question of a choleenteric fistula  Abscess catheter in good position however output is persistent  Follow-up tube check in approximately one week Workstation performed: [id number]     IR drainage tube check/change/reposition/reinsertion/upsize    Result Date: 2/1/2023  Narrative: Procedures: 1  Cholecystostomy tube check 2  Abscess drain check x3 3  Abscess drain removal x2 CONTRAST: 18 mL of iohexol (OMNIPAQUE) FLUOROSCOPY TIME/RADIATION DOSE: 1 04 minutes/15 52 mGy NUMBER OF IMAGES: Multiple COMPLICATIONS: None ANESTHESIA/ANESTHESIA: None INDICATION: R18 8: Other ascites History of metastatic disease status post Cholecystostomy tube placement and 3 abdominal drains  PROCEDURE: Risks, benefits and alternatives were explained to the patient  Questions were answered  Written consent was documented  The patient was brought to the interventional radiology suite and identified verbally and by wristband  The patient was placed supine on the table  A diagnostic abscessogram was performed through the indwelling drainage catheter  FINDINGS: 1  Cholecystostomy tube check demonstrated appropriate positioning of the tube with the locking pigtail within the lumen of the gallbladder  The cystic duct and the common bile duct were not visualized suggestive of obstruction   2   Abscess drain check of the epigastric region drain demonstrated small residual cavity with internal debris; patient reported significant output from this drain and therefore this was left in place  3   Abscess drain checks of the 2 left lateral drainage catheters demonstrated no significant residual cavity and therefore these 2 drains were removed  Impression: 1  Cholecystostomy tube check  2   Abscess drain check x3 with removal of 2 of the drains  PLAN: 1  Cholecystostomy tube maintenance exchange in 2 weeks  2   Abscess drain check for the remaining epigastric drain in 2 weeks  Workstation performed: [id number]         HISTORY:    Past Medical History:   Diagnosis Date   • Abdominal pain 03/12/2022   • Acute renal failure ([place]  )     [id number] resolved   • Acute respiratory failure with hypoxia ([place]  ) 11/12/2022   • Bacteremia 11/12/2022   • Cancer ([place] [address] )    • Diabetes mellitus ([place] [address] )    • Enteritis 08/23/2016   • Flash pulmonary edema ([place] [facility] ) 11/12/2022   • Gastroparesis due to DM ([place] [address] ) 08/23/2016   • GERD (gastroesophageal reflux disease)    • Hernia, ventral 08/04/2016   • Hyperlipidemia    • Hypertension    • Morbid obesity ([facility] ) 04/17/2018   • Postoperative visit 03/02/2022   • SIRS (systemic inflammatory response syndrome) ([place] [address] ) 03/12/2022   • Snoring    • Stage 3a chronic kidney disease ([place]  ) 02/19/2022       Past Surgical History:   Procedure Laterality Date   • COLONOSCOPY     • COLOSTOMY N/A 02/20/2022    Procedure: COLOSTOMY LOOP, diverting;  Surgeon: Lyssa Moon MD;  Location: [facility];  Service: General   • ESOPHAGOGASTRODUODENOSCOPY N/A 08/24/2016    Procedure: ESOPHAGOGASTRODUODENOSCOPY (EGD); Surgeon: Viet Mark MD;  Location: [facility];   Service:    • EXPLORATORY LAPAROTOMY W/ BOWEL RESECTION N/A 11/16/2022    Procedure: LAPAROTOMY EXPLORATORY W/ BOWEL RESECTION- VAC PLACEMENT;  Surgeon: Kendall Eden MD;  Location:  MAIN OR;  Service: Surgical Oncology   • EXPLORATORY LAPAROTOMY W/ BOWEL RESECTION N/A 11/17/2022    Procedure: Loretta Salgado EXPLORATORY W/ BOWEL RESECTION;  Surgeon: Tai Phelan MD;  Location: [facility];  Service: Surgical Oncology   • ILEOSTOMY N/A 11/17/2022    Procedure: ILEOSTOMY;  Surgeon: Tai Phelan MD;  Location: [facility];  Service: Surgical Oncology   • ILEOSTOMY CLOSURE N/A 11/7/2022    Procedure: REVERSAL COLOSTOMY;  Surgeon: Juan Antonio Davis MD;  Location: [facility];  Service: Surgical Oncology   • IR CHOLECYSTOSTOMY TUBE CHECK/CHANGE/REPOSITION/REINSERTION/UPSIZE  12/12/2022   • IR CHOLECYSTOSTOMY TUBE PLACEMENT  12/8/2022   • IR DRAINAGE TUBE CHECK AND/OR REMOVAL  1/3/2023   • IR DRAINAGE TUBE CHECK/CHANGE/REPOSITION/REINSERTION/UPSIZE  1/12/2023   • IR DRAINAGE TUBE CHECK/CHANGE/REPOSITION/REINSERTION/UPSIZE  1/31/2023   • IR DRAINAGE TUBE CHECK/CHANGE/REPOSITION/REINSERTION/UPSIZE  2/10/2023   • IR DRAINAGE TUBE CHECK/CHANGE/REPOSITION/REINSERTION/UPSIZE  2/16/2023   • IR DRAINAGE TUBE PLACEMENT  12/8/2022   • IR DRAINAGE TUBE PLACEMENT  12/20/2022   • IR PORT PLACEMENT  03/10/2022   • IR THORACENTESIS  1/12/2023   • KIDNEY STONE SURGERY     • LIVER BIOPSY LAPAROSCOPIC N/A 02/20/2022    Procedure: DIAGNOSTIC LAPAROSCOPIC LIVER BIOPSY, DIVERTING LOOP COLOSTOMY ;  Surgeon: John Owens MD;  Location: [facility];  Service: General   • LIVER LOBECTOMY N/A 11/7/2022    Procedure: SEGMENT 3 LIVER RESECTION, ABLATION, INTRAOPERATIVE U/S OF LIVER, PERITONEAL BIOPSY;  Surgeon: Juan Antonio Davis MD;  Location: [facility];  Service: Surgical Oncology   • RIGHT COLON RESECTION N/A 11/7/2022    Procedure: EX LAP, TRANVERSE COLON RESECTION;  Surgeon: Juan Antonio Davis MD;  Location[facility];  Service: Surgical Oncology   • TONSILLECTOMY     • UMBILICAL HERNIA REPAIR LAPAROSCOPIC N/A 04/26/2019    Procedure: LAPAROSCOPIC UMBILICAL HERNIA REPAIR;  Surgeon: John Owens MD;  [facility]: [facility];  Service: General   • VAC DRESSING APPLICATION N/A 40/27/2179    Procedure: APPLICATION VAC DRESSING ABDOMEN/TRUNK;  Surgeon: Gareth Gerardo MD;  [facility];  Service: Surgical Oncology       Family History   Problem Relation Age of Onset   • Diabetes Mother         mellitus   • Lung cancer Mother    • Coronary artery disease Father        Social History     Socioeconomic History   • Marital status: /Civil Union     Spouse name: None   • Number of children: None   • Years of education: None   • Highest education level: None   Occupational History   • Occupation: ACTOR     Employer: [employer]   Tobacco Use   • Smoking status: Never   • Smokeless tobacco: Never   Vaping Use   • Vaping Use: Never used   Substance and Sexual Activity   • Alcohol use: Never   • Drug use: No   • Sexual activity: Yes     Partners: Female   Other Topics Concern   • None   Social History Narrative   • None     Social Determinants of Health     Financial Resource Strain: Not on file   Food Insecurity: No Food Insecurity   • Worried About Running Out of Food in the Last Year: Never true   • Ran Out of Food in the Last Year: Never true   Transportation Needs: No Transportation Needs   • Lack of Transportation (Medical): No   • Lack of Transportation (Non-Medical):  No   Physical Activity: Insufficiently Active   • Days of Exercise per Week: 5 days   • Minutes of Exercise per Session: 10 min   Stress: No Stress Concern Present   • Feeling of Stress : Not at all   Social Connections: Not on file   Intimate Partner Violence: Not on file   Housing Stability: Low Risk    • Unable to Pay for Housing in the Last Year: No   • Number of Places Lived in the Last Year: 1   • Unstable Housing in the Last Year: No         Current Facility-Administered Medications:   •  acetaminophen (TYLENOL) tablet 650 mg, 650 mg, Oral, Q4H PRN, Duane Szymanski PA-C, 650 mg at 02/21/23 2334  •  amLODIPine (NORVASC) tablet 2 5 mg, 2 5 mg, Oral, Daily, Duane Szymanski PA-C, 2 5 mg at 02/23/23 8614  •  aspirin chewable tablet 81 mg, 81 mg, Oral, Daily, Rebecca Ziegler Marilee Aparicio PA-C, 81 mg at 02/23/23 8777  •  atorvastatin (LIPITOR) tablet 40 mg, 40 mg, Oral, Daily, Pamela Jaquez PA-C, 40 mg at 02/23/23 6418  •  collagenase (SANTYL) ointment, , Topical, Daily, Jojo Parikh PA-C, Given at 02/22/23 0932  •  DULoxetine (CYMBALTA) delayed release capsule 30 mg, 30 mg, Oral, Daily, Pamela Jaquez PA-C, 30 mg at 02/23/23 3882  •  heparin (porcine) subcutaneous injection 5,000 Units, 5,000 Units, Subcutaneous, Q8H [address], Pamela Jaquez PA-C, 5,000 Units at 02/16/23 0530  •  influenza vaccine, recombinant, quadrivalent (FLUBLOK) IM injection 0 5 mL, 0 5 mL, Intramuscular, Prior to discharge, Fritz Gandara MD  •  insulin glargine (LANTUS) subcutaneous injection 5 Units 0 05 mL, 5 Units, Subcutaneous, HS, Kiara Wang PA-C, 5 Units at 02/22/23 2106  •  insulin lispro (HumaLOG) 100 units/mL subcutaneous injection 1-6 Units, 1-6 Units, Subcutaneous, TID AC, 4 Units at 02/23/23 1155 **AND** Fingerstick Glucose (POCT), , , 4x Daily AC and at bedtime, Pamela Jaquez PA-C  •  insulin lispro (HumaLOG) 100 units/mL subcutaneous injection 1-6 Units, 1-6 Units, Subcutaneous, HS, Pamela Jaquez PA-C, 2 Units at 02/22/23 2107  •  insulin lispro (HumaLOG) 100 units/mL subcutaneous injection 4 Units, 4 Units, Subcutaneous, TID With Meals, Pamela Jaquez PA-C, 4 Units at 02/23/23 1155  •  melatonin tablet 6 mg, 6 mg, Oral, HS PRN, Bennett Conde PA-C, 6 mg at 02/21/23 2334  •  methocarbamol (ROBAXIN) tablet 500 mg, 500 mg, Oral, BID PRN, Pamela aJquez PA-C, 500 mg at 02/07/23 2118  •  multi-electrolyte (PLASMALYTE-A/ISOLYTE-S PH 7 4) IV solution, 100 mL/hr, Intravenous, Continuous, Nelia Ambrosio DO, Last Rate: 100 mL/hr at 02/22/23 2117, 100 mL/hr at 02/22/23 2117  •  ondansetron [facility]) injection 4 mg, 4 mg, Intravenous, Q4H PRN, Nelia Ambrosio DO, 4 mg at 02/11/23 2129  •  oxyCODONE (ROXICODONE) IR tablet 5 mg, 5 mg, Oral, Q4H PRN, Nelia Ambrosio DO, 5 mg at 23 2105  •  oxyCODONE (ROXICODONE) split tablet 2 5 mg, 2 5 mg, Oral, Q4H PRN, Nelia Ambrosio DO  •  pantoprazole (PROTONIX) EC tablet 40 mg, 40 mg, Oral, BID, Clearance NANETTE Bautista, 40 mg at 23 7470  •  pneumococcal 13-valent conjugate vaccine (PREVNAR-13) IM injection 0 5 mL, 0 5 mL, Intramuscular, Prior to discharge, Vidal Harden MD  •  simethicone (MYLICON) chewable tablet 80 mg, 80 mg, Oral, 4x Daily (with meals and at bedtime), Clearance NANETTE Bautista, 80 mg at 23 1155  •  sodium chloride (PF) 0 9 % injection 10 mL, 10 mL, Intravenous, Daily, Nelia Ambrosio DO, 10 mL at 23 0932  •  tamsulosin (FLOMAX) capsule 0 4 mg, 0 4 mg, Oral, Daily With Dinner, Clearance NANETTE Bautista, 0 4 mg at 23 1815    Medications Prior to Admission   Medication   • acetaminophen (TYLENOL) 325 mg tablet   • amLODIPine (NORVASC) 2 5 mg tablet   • [] amoxicillin (AMOXIL) 500 MG tablet   • [] ampicillin (PRINCIPEN) 500 mg capsule   • aspirin 81 MG tablet   • atorvastatin (LIPITOR) 40 mg tablet   • Cholecalciferol (VITAMIN D3 PO)   • collagenase (SANTYL) ointment   • Continuous Blood Gluc Sensor (FreeStyle Parrish 14 Day Sensor) MISC   • DULoxetine (CYMBALTA) 30 mg delayed release capsule   • Insulin Glargine Solostar (Lantus SoloStar) 100 UNIT/ML SOPN   • insulin lispro (HumaLOG KwikPen) 100 units/mL injection pen   • Insulin Pen Needle (B-D UF III MINI PEN NEEDLES) 31G X 5 MM MISC   • Insulin Pen Needle (BD Pen Needle Jessica 2nd Gen) 32G X 4 MM MISC   • Insulin Pen Needle (BD Pen Needle Jessica 2nd Gen) 32G X 4 MM MISC   • methocarbamol (ROBAXIN) 500 mg tablet   • Multiple Vitamins-Minerals (multivitamin with minerals) tablet   • naloxone (NARCAN) 4 mg/0 1 mL nasal spray   • Needle, Disp, (HYPODERMIC NEEDLE) 23G X 1-1/2" MISC   • ondansetron (ZOFRAN-ODT) 4 mg disintegrating tablet   • Ostomy Supplies MISC   • oxyCODONE (ROXICODONE) 10 MG TABS   • pantoprazole (PROTONIX) 40 mg tablet   • simethicone (MYLICON) 80 mg chewable tablet   • sodium chloride, PF, 0 9 %   • [] sulfamethoxazole-trimethoprim (BACTRIM DS) 800-160 mg per tablet   • tamsulosin (FLOMAX) 0 4 mg       Allergies   Allergen Reactions   • Shellfish-Derived Products - Food Allergy Anaphylaxis   • Erythromycin GI Intolerance       Labs and pertinent reports reviewed  This note has been generated by voice recognition software system  Therefore, there may be spelling, grammar, and or syntax errors  Please contact if questions arise

## 2023-02-23 NOTE — ASSESSMENT & PLAN NOTE
Lab Results   Component Value Date    EGFR 52 02/23/2023    EGFR 52 02/22/2023    EGFR 58 02/21/2023    CREATININE 1 45 (H) 02/23/2023    CREATININE 1 45 (H) 02/22/2023    CREATININE 1 33 (H) 02/21/2023     · POA with creatinine 2 52; baseline 1-1 3  · Suspecting possible multifactorial cause in setting of dehydration and sepsis    · Continue with tia IV fluids  · Avoid nephrotoxic agents  · Steadily downtrending

## 2023-02-24 ENCOUNTER — APPOINTMENT (OUTPATIENT)
Dept: NON INVASIVE DIAGNOSTICS | Facility: HOSPITAL | Age: 58
End: 2023-02-24

## 2023-02-24 ENCOUNTER — HOSPITAL ENCOUNTER (INPATIENT)
Facility: HOSPITAL | Age: 58
LOS: 18 days | End: 2023-03-14
Attending: STUDENT IN AN ORGANIZED HEALTH CARE EDUCATION/TRAINING PROGRAM | Admitting: STUDENT IN AN ORGANIZED HEALTH CARE EDUCATION/TRAINING PROGRAM

## 2023-02-24 VITALS
RESPIRATION RATE: 12 BRPM | BODY MASS INDEX: 27.92 KG/M2 | WEIGHT: 195 LBS | DIASTOLIC BLOOD PRESSURE: 81 MMHG | SYSTOLIC BLOOD PRESSURE: 128 MMHG | HEIGHT: 70 IN | TEMPERATURE: 96.7 F | HEART RATE: 79 BPM | OXYGEN SATURATION: 97 %

## 2023-02-24 DIAGNOSIS — R74.8 ELEVATED ALKALINE PHOSPHATASE LEVEL: ICD-10-CM

## 2023-02-24 DIAGNOSIS — E11.69 TYPE 2 DIABETES MELLITUS WITH HYPERLIPIDEMIA (HCC): ICD-10-CM

## 2023-02-24 DIAGNOSIS — E78.2 MIXED HYPERLIPIDEMIA: ICD-10-CM

## 2023-02-24 DIAGNOSIS — N52.9 ERECTILE DYSFUNCTION, UNSPECIFIED ERECTILE DYSFUNCTION TYPE: ICD-10-CM

## 2023-02-24 DIAGNOSIS — C18.4 MALIGNANT NEOPLASM OF TRANSVERSE COLON (HCC): ICD-10-CM

## 2023-02-24 DIAGNOSIS — K81.9 CHOLECYSTITIS, UNSPECIFIED: ICD-10-CM

## 2023-02-24 DIAGNOSIS — I10 BENIGN ESSENTIAL HYPERTENSION: ICD-10-CM

## 2023-02-24 DIAGNOSIS — D72.829 LEUKOCYTOSIS: ICD-10-CM

## 2023-02-24 DIAGNOSIS — E78.5 TYPE 2 DIABETES MELLITUS WITH HYPERLIPIDEMIA (HCC): ICD-10-CM

## 2023-02-24 DIAGNOSIS — Q84.5 ENLARGED AND HYPERTROPHIC NAILS: ICD-10-CM

## 2023-02-24 DIAGNOSIS — D72.829 LEUKOCYTOSIS, UNSPECIFIED TYPE: ICD-10-CM

## 2023-02-24 DIAGNOSIS — T81.30XA ABDOMINAL WOUND DEHISCENCE, INITIAL ENCOUNTER: ICD-10-CM

## 2023-02-24 DIAGNOSIS — C78.7 COLON CANCER METASTASIZED TO LIVER (HCC): ICD-10-CM

## 2023-02-24 DIAGNOSIS — E87.1 HYPONATREMIA: ICD-10-CM

## 2023-02-24 DIAGNOSIS — L02.91 ABSCESS: ICD-10-CM

## 2023-02-24 DIAGNOSIS — C18.9 COLON CANCER METASTASIZED TO LIVER (HCC): ICD-10-CM

## 2023-02-24 DIAGNOSIS — R11.0 NAUSEA: ICD-10-CM

## 2023-02-24 DIAGNOSIS — N17.9 ACUTE RENAL FAILURE SUPERIMPOSED ON STAGE 3A CHRONIC KIDNEY DISEASE, UNSPECIFIED ACUTE RENAL FAILURE TYPE (HCC): ICD-10-CM

## 2023-02-24 DIAGNOSIS — R52 ACUTE PAIN: ICD-10-CM

## 2023-02-24 DIAGNOSIS — N20.0 NEPHROLITHIASIS: Primary | ICD-10-CM

## 2023-02-24 DIAGNOSIS — R79.89 LOW TESTOSTERONE: ICD-10-CM

## 2023-02-24 DIAGNOSIS — T81.31XA DEHISCENCE OF INCISION: ICD-10-CM

## 2023-02-24 DIAGNOSIS — R74.01 TRANSAMINITIS: ICD-10-CM

## 2023-02-24 DIAGNOSIS — T81.30XD ABDOMINAL WOUND DEHISCENCE, SUBSEQUENT ENCOUNTER: ICD-10-CM

## 2023-02-24 DIAGNOSIS — N18.31 ACUTE RENAL FAILURE SUPERIMPOSED ON STAGE 3A CHRONIC KIDNEY DISEASE, UNSPECIFIED ACUTE RENAL FAILURE TYPE (HCC): ICD-10-CM

## 2023-02-24 DIAGNOSIS — A41.9 SEPSIS (HCC): ICD-10-CM

## 2023-02-24 DIAGNOSIS — R17 ELEVATED BILIRUBIN: ICD-10-CM

## 2023-02-24 PROBLEM — F32.A DEPRESSION: Status: ACTIVE | Noted: 2023-02-24

## 2023-02-24 PROBLEM — T81.321A ABDOMINAL WOUND DEHISCENCE: Status: ACTIVE | Noted: 2023-01-01

## 2023-02-24 PROBLEM — J90 CHRONIC BILATERAL PLEURAL EFFUSIONS: Status: ACTIVE | Noted: 2023-02-24

## 2023-02-24 LAB
ALBUMIN SERPL BCP-MCNC: 1.1 G/DL (ref 3.5–5)
ALP SERPL-CCNC: 790 U/L (ref 46–116)
ALT SERPL W P-5'-P-CCNC: 9 U/L (ref 12–78)
ANION GAP SERPL CALCULATED.3IONS-SCNC: 11 MMOL/L (ref 4–13)
AST SERPL W P-5'-P-CCNC: 51 U/L (ref 5–45)
BILIRUB SERPL-MCNC: 0.6 MG/DL (ref 0.2–1)
BUN SERPL-MCNC: 14 MG/DL (ref 5–25)
CA-I BLD-SCNC: 1.05 MMOL/L (ref 1.12–1.32)
CALCIUM ALBUM COR SERPL-MCNC: 9.9 MG/DL (ref 8.3–10.1)
CALCIUM SERPL-MCNC: 7.6 MG/DL (ref 8.3–10.1)
CHLORIDE SERPL-SCNC: 101 MMOL/L (ref 96–108)
CO2 SERPL-SCNC: 20 MMOL/L (ref 21–32)
CREAT SERPL-MCNC: 1.36 MG/DL (ref 0.6–1.3)
ERYTHROCYTE [DISTWIDTH] IN BLOOD BY AUTOMATED COUNT: 16.6 % (ref 11.6–15.1)
FLUAV RNA RESP QL NAA+PROBE: NEGATIVE
FLUBV RNA RESP QL NAA+PROBE: NEGATIVE
GFR SERPL CREATININE-BSD FRML MDRD: 56 ML/MIN/1.73SQ M
GLUCOSE SERPL-MCNC: 103 MG/DL (ref 65–140)
GLUCOSE SERPL-MCNC: 105 MG/DL (ref 65–140)
GLUCOSE SERPL-MCNC: 106 MG/DL (ref 65–140)
GLUCOSE SERPL-MCNC: 120 MG/DL (ref 65–140)
GLUCOSE SERPL-MCNC: 152 MG/DL (ref 65–140)
HCT VFR BLD AUTO: 23 % (ref 36.5–49.3)
HGB BLD-MCNC: 7.2 G/DL (ref 12–17)
MCH RBC QN AUTO: 25.4 PG (ref 26.8–34.3)
MCHC RBC AUTO-ENTMCNC: 31.3 G/DL (ref 31.4–37.4)
MCV RBC AUTO: 81 FL (ref 82–98)
PLATELET # BLD AUTO: 301 THOUSANDS/UL (ref 149–390)
PMV BLD AUTO: 10.1 FL (ref 8.9–12.7)
POTASSIUM SERPL-SCNC: 3.7 MMOL/L (ref 3.5–5.3)
PROT SERPL-MCNC: 6.4 G/DL (ref 6.4–8.4)
RBC # BLD AUTO: 2.84 MILLION/UL (ref 3.88–5.62)
RSV RNA RESP QL NAA+PROBE: NEGATIVE
SARS-COV-2 RNA RESP QL NAA+PROBE: NEGATIVE
SODIUM SERPL-SCNC: 132 MMOL/L (ref 135–147)
WBC # BLD AUTO: 10.42 THOUSAND/UL (ref 4.31–10.16)

## 2023-02-24 RX ORDER — INSULIN LISPRO 100 [IU]/ML
1-6 INJECTION, SOLUTION INTRAVENOUS; SUBCUTANEOUS
Status: DISCONTINUED | OUTPATIENT
Start: 2023-02-24 | End: 2023-03-14 | Stop reason: HOSPADM

## 2023-02-24 RX ORDER — FENTANYL CITRATE 50 UG/ML
INJECTION, SOLUTION INTRAMUSCULAR; INTRAVENOUS AS NEEDED
Status: COMPLETED | OUTPATIENT
Start: 2023-02-24 | End: 2023-02-24

## 2023-02-24 RX ORDER — OXYCODONE HYDROCHLORIDE 5 MG/1
2.5 TABLET ORAL EVERY 4 HOURS PRN
Status: DISCONTINUED | OUTPATIENT
Start: 2023-02-24 | End: 2023-03-14 | Stop reason: HOSPADM

## 2023-02-24 RX ORDER — ONDANSETRON 2 MG/ML
4 INJECTION INTRAMUSCULAR; INTRAVENOUS EVERY 4 HOURS PRN
Status: DISCONTINUED | OUTPATIENT
Start: 2023-02-24 | End: 2023-03-14 | Stop reason: HOSPADM

## 2023-02-24 RX ORDER — OXYCODONE HYDROCHLORIDE 5 MG/1
5 TABLET ORAL EVERY 4 HOURS PRN
Status: DISCONTINUED | OUTPATIENT
Start: 2023-02-24 | End: 2023-03-14 | Stop reason: HOSPADM

## 2023-02-24 RX ORDER — LIDOCAINE HYDROCHLORIDE 10 MG/ML
INJECTION, SOLUTION EPIDURAL; INFILTRATION; INTRACAUDAL; PERINEURAL AS NEEDED
Status: COMPLETED | OUTPATIENT
Start: 2023-02-24 | End: 2023-02-24

## 2023-02-24 RX ORDER — AMLODIPINE BESYLATE 2.5 MG/1
2.5 TABLET ORAL DAILY
Status: DISCONTINUED | OUTPATIENT
Start: 2023-02-25 | End: 2023-02-27

## 2023-02-24 RX ORDER — MIDAZOLAM HYDROCHLORIDE 2 MG/2ML
INJECTION, SOLUTION INTRAMUSCULAR; INTRAVENOUS AS NEEDED
Status: COMPLETED | OUTPATIENT
Start: 2023-02-24 | End: 2023-02-24

## 2023-02-24 RX ORDER — METHOCARBAMOL 500 MG/1
500 TABLET, FILM COATED ORAL 2 TIMES DAILY PRN
Refills: 0 | Status: ON HOLD
Start: 2023-02-24

## 2023-02-24 RX ORDER — INSULIN GLARGINE 100 [IU]/ML
5 INJECTION, SOLUTION SUBCUTANEOUS
Status: DISCONTINUED | OUTPATIENT
Start: 2023-02-24 | End: 2023-03-11

## 2023-02-24 RX ORDER — INSULIN LISPRO 100 [IU]/ML
4 INJECTION, SOLUTION INTRAVENOUS; SUBCUTANEOUS
Refills: 0 | Status: ON HOLD
Start: 2023-02-24

## 2023-02-24 RX ORDER — TAMSULOSIN HYDROCHLORIDE 0.4 MG/1
0.4 CAPSULE ORAL
Status: DISCONTINUED | OUTPATIENT
Start: 2023-02-24 | End: 2023-03-14 | Stop reason: HOSPADM

## 2023-02-24 RX ORDER — OXYCODONE HYDROCHLORIDE 5 MG/1
5 TABLET ORAL EVERY 4 HOURS PRN
Refills: 0 | Status: SHIPPED | OUTPATIENT
Start: 2023-02-24 | End: 2023-02-24 | Stop reason: SDUPTHER

## 2023-02-24 RX ORDER — ACETAMINOPHEN 325 MG/1
650 TABLET ORAL EVERY 4 HOURS PRN
Status: DISCONTINUED | OUTPATIENT
Start: 2023-02-24 | End: 2023-03-14 | Stop reason: HOSPADM

## 2023-02-24 RX ORDER — SIMETHICONE 80 MG
80 TABLET,CHEWABLE ORAL
Status: DISCONTINUED | OUTPATIENT
Start: 2023-02-24 | End: 2023-03-14 | Stop reason: HOSPADM

## 2023-02-24 RX ORDER — LANOLIN ALCOHOL/MO/W.PET/CERES
6 CREAM (GRAM) TOPICAL
Status: DISCONTINUED | OUTPATIENT
Start: 2023-02-24 | End: 2023-03-12

## 2023-02-24 RX ORDER — ATORVASTATIN CALCIUM 40 MG/1
40 TABLET, FILM COATED ORAL DAILY
Status: DISCONTINUED | OUTPATIENT
Start: 2023-02-25 | End: 2023-03-14 | Stop reason: HOSPADM

## 2023-02-24 RX ORDER — DULOXETIN HYDROCHLORIDE 30 MG/1
30 CAPSULE, DELAYED RELEASE ORAL DAILY
Status: DISCONTINUED | OUTPATIENT
Start: 2023-02-25 | End: 2023-03-14 | Stop reason: HOSPADM

## 2023-02-24 RX ORDER — ASPIRIN 81 MG/1
81 TABLET, CHEWABLE ORAL DAILY
Status: DISCONTINUED | OUTPATIENT
Start: 2023-02-25 | End: 2023-03-14 | Stop reason: HOSPADM

## 2023-02-24 RX ORDER — HEPARIN SODIUM 5000 [USP'U]/ML
5000 INJECTION, SOLUTION INTRAVENOUS; SUBCUTANEOUS EVERY 8 HOURS SCHEDULED
Status: DISCONTINUED | OUTPATIENT
Start: 2023-02-24 | End: 2023-02-27

## 2023-02-24 RX ORDER — OXYCODONE HYDROCHLORIDE 5 MG/1
2.5 TABLET ORAL EVERY 4 HOURS PRN
Refills: 0 | Status: SHIPPED | OUTPATIENT
Start: 2023-02-24 | End: 2023-02-24

## 2023-02-24 RX ORDER — INSULIN LISPRO 100 [IU]/ML
4 INJECTION, SOLUTION INTRAVENOUS; SUBCUTANEOUS
Status: DISCONTINUED | OUTPATIENT
Start: 2023-02-24 | End: 2023-03-04

## 2023-02-24 RX ORDER — OXYCODONE HYDROCHLORIDE 5 MG/1
5 TABLET ORAL EVERY 4 HOURS PRN
Qty: 20 TABLET | Refills: 0 | Status: ON HOLD | OUTPATIENT
Start: 2023-02-24 | End: 2023-03-06

## 2023-02-24 RX ORDER — INSULIN GLARGINE 100 [IU]/ML
5 INJECTION, SOLUTION SUBCUTANEOUS
Qty: 10 ML | Refills: 0 | Status: ON HOLD
Start: 2023-02-24

## 2023-02-24 RX ORDER — LANOLIN ALCOHOL/MO/W.PET/CERES
6 CREAM (GRAM) TOPICAL
Refills: 0 | Status: ON HOLD
Start: 2023-02-24

## 2023-02-24 RX ORDER — PANTOPRAZOLE SODIUM 40 MG/1
40 TABLET, DELAYED RELEASE ORAL
Status: DISCONTINUED | OUTPATIENT
Start: 2023-02-25 | End: 2023-03-14 | Stop reason: HOSPADM

## 2023-02-24 RX ORDER — METHOCARBAMOL 500 MG/1
500 TABLET, FILM COATED ORAL 2 TIMES DAILY PRN
Status: DISCONTINUED | OUTPATIENT
Start: 2023-02-24 | End: 2023-03-14 | Stop reason: HOSPADM

## 2023-02-24 RX ADMIN — ACETAMINOPHEN 650 MG: 325 TABLET ORAL at 23:34

## 2023-02-24 RX ADMIN — OXYCODONE HYDROCHLORIDE 5 MG: 5 TABLET ORAL at 21:20

## 2023-02-24 RX ADMIN — SIMETHICONE 80 MG: 80 TABLET, CHEWABLE ORAL at 17:50

## 2023-02-24 RX ADMIN — ASPIRIN 81 MG 81 MG: 81 TABLET ORAL at 10:15

## 2023-02-24 RX ADMIN — FENTANYL CITRATE 50 MCG: 50 INJECTION, SOLUTION INTRAMUSCULAR; INTRAVENOUS at 09:36

## 2023-02-24 RX ADMIN — OXYCODONE HYDROCHLORIDE 5 MG: 5 TABLET ORAL at 16:04

## 2023-02-24 RX ADMIN — COLLAGENASE SANTYL: 250 OINTMENT TOPICAL at 10:00

## 2023-02-24 RX ADMIN — IOHEXOL 5 ML: 350 INJECTION, SOLUTION INTRAVENOUS at 10:02

## 2023-02-24 RX ADMIN — INSULIN LISPRO 1 UNITS: 100 INJECTION, SOLUTION INTRAVENOUS; SUBCUTANEOUS at 17:55

## 2023-02-24 RX ADMIN — SODIUM CHLORIDE, PRESERVATIVE FREE 10 ML: 5 INJECTION INTRAVENOUS at 10:15

## 2023-02-24 RX ADMIN — INSULIN GLARGINE 5 UNITS: 100 INJECTION, SOLUTION SUBCUTANEOUS at 21:21

## 2023-02-24 RX ADMIN — AMLODIPINE BESYLATE 2.5 MG: 2.5 TABLET ORAL at 10:15

## 2023-02-24 RX ADMIN — METHOCARBAMOL 500 MG: 500 TABLET ORAL at 21:20

## 2023-02-24 RX ADMIN — ATORVASTATIN CALCIUM 40 MG: 40 TABLET, FILM COATED ORAL at 10:15

## 2023-02-24 RX ADMIN — IOHEXOL 5 ML: 350 INJECTION, SOLUTION INTRAVENOUS at 11:01

## 2023-02-24 RX ADMIN — MIDAZOLAM HYDROCHLORIDE 1 MG: 1 INJECTION, SOLUTION INTRAMUSCULAR; INTRAVENOUS at 09:36

## 2023-02-24 RX ADMIN — LIDOCAINE HYDROCHLORIDE 2 ML: 10 INJECTION, SOLUTION EPIDURAL; INFILTRATION; INTRACAUDAL; PERINEURAL at 09:41

## 2023-02-24 RX ADMIN — MIDAZOLAM HYDROCHLORIDE 1 MG: 1 INJECTION, SOLUTION INTRAMUSCULAR; INTRAVENOUS at 09:27

## 2023-02-24 RX ADMIN — IOHEXOL 10 ML: 350 INJECTION, SOLUTION INTRAVENOUS at 09:59

## 2023-02-24 RX ADMIN — INSULIN LISPRO 4 UNITS: 100 INJECTION, SOLUTION INTRAVENOUS; SUBCUTANEOUS at 17:55

## 2023-02-24 RX ADMIN — PANTOPRAZOLE SODIUM 40 MG: 40 TABLET, DELAYED RELEASE ORAL at 10:15

## 2023-02-24 RX ADMIN — SODIUM CHLORIDE, SODIUM GLUCONATE, SODIUM ACETATE, POTASSIUM CHLORIDE, MAGNESIUM CHLORIDE, SODIUM PHOSPHATE, DIBASIC, AND POTASSIUM PHOSPHATE 100 ML/HR: .53; .5; .37; .037; .03; .012; .00082 INJECTION, SOLUTION INTRAVENOUS at 02:30

## 2023-02-24 RX ADMIN — SIMETHICONE 80 MG: 80 TABLET, CHEWABLE ORAL at 21:21

## 2023-02-24 RX ADMIN — FENTANYL CITRATE 50 MCG: 50 INJECTION, SOLUTION INTRAMUSCULAR; INTRAVENOUS at 09:28

## 2023-02-24 RX ADMIN — TAMSULOSIN HYDROCHLORIDE 0.4 MG: 0.4 CAPSULE ORAL at 17:50

## 2023-02-24 RX ADMIN — DULOXETINE HYDROCHLORIDE 30 MG: 30 CAPSULE, DELAYED RELEASE ORAL at 10:15

## 2023-02-24 RX ADMIN — MELATONIN 6 MG: at 21:21

## 2023-02-24 RX ADMIN — INSULIN LISPRO 4 UNITS: 100 INJECTION, SOLUTION INTRAVENOUS; SUBCUTANEOUS at 12:38

## 2023-02-24 RX ADMIN — SIMETHICONE 80 MG: 80 TABLET, CHEWABLE ORAL at 11:18

## 2023-02-24 NOTE — CONSULTS
Internal Medicine Consultation Note    Patient: Enedina Lama  Age/sex: 61 y o  male  Medical Record #: [id number]      Assessment/Plan:    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  · s/p diverting loop colostomy/liver biopsy with subsequent chemotherapy 2/2022  · s/p hepatic resection and reversal of colostomy on 11/7/22  · s/p ex-lap with bowel resection/VAC placement 11/16/22  · s/p right hemicolectomy with partial descending colectomy, colocolonic anastomosis with loop ileostomy and mid line abdominal VAC placement 11/17  · s/p additional drains placed for a perisplenic abscess and splenic abscess 12/20/22 (then had 3 drains plus the already existing perc alvina tube)  · The 2 left lateral drains were removed by IR 1/31/23 as an OP  · Had previously been tx'd with Ertapenem  · This hospital stay had fever/leukocytosis/sepsis, started Meropenem and then to Vancomycin and Ertapenem  · Wound cx again = Ecoli ESBL  · LD Ertapenem 2/14, Vancomycin 2/16  · To IR 2/24 = Abscessogram of midline drain showed minimal persistent collection and the midline catheter was removed  Acute cholecystitis  · s/p percutaneous tube placement 12/8/22  · CT scan was concerning for cholecystitis --> to IR 2/24 = showed retraction of the alvina tube and interval occlusion of the cystic duct  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  Alvina tube was exchanged  PORT catheter  · On 2/24/23 in IR = port check showed probable small thrombus at tip of cathter, flushed/improved aspiration of the port    Hypertension  · Stable on Norvasc 2 5 mg qd  · No changes today     Diabetes mellitus  · Home:  Lantus 8U qam/Lispro 4U TID  · Here:  Lantus 5U qhs/Lispro 4U TID     · Has a DEXCOM meter and a regular meter at home  · Continue DM diet and QID Accuchecks/SSI     ALEXY/CKD  · Stage III; baseline 1 2-1 4  · Creat was 2 5 on admission with a K+ level of 5 9  · Currently creat is 1 36     Anemia  · Transfused in December and January  · Hemoglobin is 7 2 today a drop from yesterday of 8 1  · Will recheck in AM and transfuse prn     Situational depression  · Cymbalta      Left pleural effusion  · Thoracentesis 1/12 for 300ml  · Cyto from pleural fluid was negative for malignancy; cx = NG     Hyponatremia  · Mild  · Felt likely 2/2 SIADH in setting of malignancy      Discharge date:  Team       Subjective/HPI:     [id number] [facility]  Paulo Dunn is a 61year old patient with a history of DM2, HLD and gastroparesis  He was diagnosed with metastatic colon cancer in February of 2/2022 when he presented to the hospital with abdominal pain, constipation  He was found to have an apple-core lesion in the distal transverse colon which was near obstructing along with metastatic disease to the liver  He underwent a diverting loop colostomy and liver biopsy with subsequent chemotherapy  On 11/7/22, he had an exploratory laparotomy, transverse colon resection, segment 3 liver resection/ablation, peritoneal biopsy, takedown of the splenic flexure and reversal of the colostomy  On 11/16/22, he had an ex-lap with bowel resection/VAC placement then on 11/17/23 an ex lap, ileostomy/VAC dressing for an anastomotic leak  He was then sent to a SNF in [place] but they sent him back the same day to the hospital citing issues with pain and wound management  He then developed acute cholecystitis requiring a percutaneous cholecystostomy drain placement/hepatectomy abscess drain placement on 12/8/23  Cultures were positive for ESBL E  Coli and he was placed on Ertapenem by Infectious Disease  He also developed acute on chronic renal failure  He was transferred here to [facility] on 12/14/22 and remained here until 1/17/23  During that time, he had 2 additional drains placed on 12/20/22 for an abscess in perisplenic and splenic regions  He had a left thoracentesis on 1/12/23 for 300ml  Cytology was negative for malignancy    On 1/10/23, WBC became elevated and was febrile to 102  4   he had completed the Ertapenem but was restarted  Blood cultures were negative at the time  CT A/P showed a slightly enlarging perihepatic collection near segment 3 and the drain was upsized  on 1/12/23 and drained 40ml purulent fluid  He was discharged to home on 1/17/23  As an outpatient, the 2 left lateral drains were removed by IR on 1/31/23  On 2/7/23, he presented [facility] with increased fatigue/generalized weakness, increased abdominal pain and foul smelling drainage from the abdominal wound  He was found to be septic  He was started on Zosyn  CT chest/abdomen/pelvis showed multiple small bilateral lung nodules concerning for metastatic lung disease;  fluid collection at the level of the left lobe of the liver was smaller with resolution of the other perisplenic fluid collections and improvement in collections in the left retroperitoneum inferior to the spleen  There was interval decrease of the bilateral pleural effusions  His creatinine was 2 5 with a K+ of 5 9   he was placed on IVF  He was hyponatremia felt to be 2/2 SIADH in setting of malignancy  Wound culture was positive for Ecoli ESBL and MRSA  He was initially placed on Meropenem by ID then changed to Ertapenem and Vancomycin  General surgery saw in consult and performed a bedside debridement of the abdominal wound on 2/8/22  Right upper quadrant ultrasound was done for elevated alkaline phosphatase was without biliary ductal dilatation  IR check of the cholecystostomy tube on 2/10/23 was unremarkable  His last day of Ertapenem was 2/14/23 and Vancomycin was 2/16/23  He then developed intermittent fevers and WBC elevated to 15 from 11  Blood cultures were repeated and are currently no growth at 48 hours  CT abdomen and pelvis showed "interval worsening of moderate right/small left pleural effusions    Compared to 1/11/2023, there was interval progression/worsening of heterogeneous low-density masses of the liver  Liver drain and a cystostomy tubes were felt to be stable  The gallbladder demonstrated irregular wall thickening/pericholecystic edema consistent with cholecystitis  He went back to IR today 2/24/23  An abscessogram of midline drain showed minimal persistent collection and the catheter was removed  Perc radha tube check showed retraction of the tube and interval occlusion of the cystic duct with persistent tiffanie-enteric fistula  The perc radha tube was exchanged  Patient is now in [facility] for inpatient acute rehabilitation and we are ask to assist with medical management  Currently there are no complaints of CP, SOB, dizziness, N/V/D        ROS:   A 10 point ROS was performed; negative except as noted above       Social History:    Substance Use History:   Social History     Substance and Sexual Activity   Alcohol Use Never     Social History     Tobacco Use   Smoking Status Never   Smokeless Tobacco Never     Social History     Substance and Sexual Activity   Drug Use No       Family History:    Family History   Problem Relation Age of Onset   • Diabetes Mother         mellitus   • Lung cancer Mother    • Coronary artery disease Father          Review of Scheduled Meds:  Current Facility-Administered Medications   Medication Dose Route Frequency Provider Last Rate   • acetaminophen  650 mg Oral Q4H PRN Mari Nazario, DO     • [START ON 2/25/2023] amLODIPine  2 5 mg Oral Daily Mari Nazario, DO     • [START ON 2/25/2023] aspirin  81 mg Oral Daily Mari Nazario, DO     • [START ON 2/25/2023] atorvastatin  40 mg Oral Daily Mari Nazario, DO     • [START ON 2/25/2023] collagenase   Topical Daily Mari Nazario, DO     • [START ON 2/25/2023] DULoxetine  30 mg Oral Daily Mari Nazario, DO     • heparin (porcine)  5,000 Units Subcutaneous Q8H [address] Mari Nazario, DO     • insulin glargine  5 Units Subcutaneous HS Mari Nazario, DO     • insulin lispro  1-6 Units Subcutaneous TID AC Mari Nazario, DO • insulin lispro  1-6 Units Subcutaneous HS Ida To, DO     • insulin lispro  4 Units Subcutaneous TID With Meals Ida To,      • melatonin  6 mg Oral HS PRN Ida To DO     • methocarbamol  500 mg Oral BID PRN Ida To DO     • ondansetron  4 mg Intravenous Q4H PRN Ida To DO     • oxyCODONE  2 5 mg Oral Q4H PRN Ida To DO     • oxyCODONE  5 mg Oral Q4H PRN Ida To DO     • [START ON 2/25/2023] pantoprazole  40 mg Oral BID AC Ida To DO     • simethicone  80 mg Oral 4x Daily (with meals and at bedtime) Ida To DO     • tamsulosin  0 4 mg Oral Daily With Dinner Ida To DO         Labs:     Results from last 7 days   Lab Units 02/24/23  0532 02/23/23  0520   WBC Thousand/uL 10 42* 11 94*   HEMOGLOBIN g/dL 7 2* 8 1*   HEMATOCRIT % 23 0* 25 1*   PLATELETS Thousands/uL 301 316     Results from last 7 days   Lab Units 02/24/23  0532 02/23/23  0520   SODIUM mmol/L 132* 132*   POTASSIUM mmol/L 3 7 3 9   CHLORIDE mmol/L 101 101   CO2 mmol/L 20* 20*   BUN mg/dL 14 16   CREATININE mg/dL 1 36* 1 45*   CALCIUM mg/dL 7 6* 7 7*                Results from last 7 days   Lab Units 02/24/23  1137 02/24/23  0839 02/23/23  2117   POC GLUCOSE mg/dl 105 106 101       Lab Results   Component Value Date    BLOODCX No Growth at 48 hrs  02/22/2023    BLOODCX No Growth at 48 hrs  02/22/2023    BLOODCX No Growth After 5 Days  02/07/2023    BLOODCX No Growth After 5 Days   02/07/2023    URINECX No Growth <1000 cfu/mL 08/23/2016    WOUNDCULT 3+ Growth of Escherichia coli ESBL (A) 02/07/2023    WOUNDCULT (A) 02/07/2023     3+ Growth of Methicillin Resistant Staphylococcus aureus    WOUNDCULT 3+ Growth of 02/07/2023       Input and Output Summary (last 24 hours):     No intake or output data in the 24 hours ending 02/24/23 2609    Imaging:     No orders to display       *Labs /Radiology studies reviewed  *Medications reviewed and reconciled as needed  *Please refer to order section for additional ordered labs studies  *Case discussed with primary attending during morning huddle case rounds    Vitals:   Temp (24hrs), Av 4 °F (36 3 °C), Min:96 7 °F (35 9 °C), Max:98 1 °F (36 7 °C)    Temp:  [96 7 °F (35 9 °C)-98 1 °F (36 7 °C)] 98 1 °F (36 7 °C)  HR:  [75-81] 81  Resp:  [12-19] 17  BP: (128-147)/(77-88) 136/87  SpO2:  [96 %-100 %] 97 %  There is no height or weight on file to calculate BMI  Physical Exam:   General Appearance: no distress, conversive  HEENT:  External ear normal   Nose normal w/o drainage  Mucous membranes are moist  Oropharynx is clear  Conjunctiva clear w/o icterus or redness  Neck:  Supple, normal ROM  Lungs: BBS without crackles/wheeze/rhonchi; + decreased BS R>L; respirations unlabored with normal inspiratory/expiratory effort  No retractions noted  On RA  CV: regular rate and rhythm; no rubs/murmurs/gallops, PMI normal   ABD: Abdomen is soft  Bowel sounds all quadrants  Nontender with no distention  +perc radha tube  Abd dressing dry  Ostomy bag with loose light brown stool  EXT: no edema  Skin: normal turgor, normal texture, no rashes  Psych: affect normal, mood normal  Neuro: AAO          Invasive Devices     Central Venous Catheter Line  Duration           Port A Cath 03/10/22 Right Chest 351 days          Drain  Duration           Ileostomy LUQ 99 days    Cholecystostomy Tube <1 day                 VTE Pharmacologic Prophylaxis: Heparin  Code Status: Level 1 - Full Code  Current Length of Stay: 0 day(s)    Total floor / unit time spent today 1 hour with more than 50% spent counseling/coordinating care  Counseling includes discussion with patient re: progress  and discussion with patient of his/her current medical state/information  Coordination of patient's care was performed in conjunction with primary service  Time invested included review of patient's labs, vitals, and management of their comorbidities with continued monitoring   In addition, this patient was discussed with medical team including physician and advanced extenders  The care of the patient was extensively discussed and appropriate treatment plan was formulated unique for this patient by supervising physician unless stated otherwise in their attestation statement  ** Please Note: voice to text software may have been used in the creation of this document   Audio transcription errors may occur**

## 2023-02-24 NOTE — PROGRESS NOTES
Progress Note - Infectious Disease   Bevely Guardian 61 y o  male MRN: [id number]  Unit/Bed#: -01 Encounter: [id number]      Impression/Plan:  1   Systemic inflammatory response syndrome   Leukocytosis and tachycardia  Present on admission   Possible sepsis without a well-defined source   The patient CT chest abdomen and pelvis does not reveal any new source of sepsis   Urinalysis nondiagnostic for UTI   The abdominal wound has been debrided   Unclear significance of the extremely high alkaline phosphatase level with the total bilirubin normal and the remainder of the transaminases not proportionally high   Consideration for the possibility of bacteremia   Right upper quadrant ultrasound without biliary ductal dilatation   Wound culture with ESBL and MRSA   No recurrence of the fever and the white cell count has come down substantially   Patient completed more than a week of treatment for the ESBL and MRSA   No recurrence of the low-grade fever and the white cell count has decreased without intervention  Repeat CT of the abdomen pelvis with incompletely decompressed gallbladder and increasing pleural effusion on the right  Repeat blood cultures negative thus far  -No additional antibiotics for now  -Surgery follow-up  -Source control measures as below  -Supportive care  -Follow-up repeat blood cultures  -Consult interventional radiology for revision of the biliary drain and possible thoracentesis     2   Intra-abdominal abscesses   Status post IR drainage with drains remaining in place but with improved findings on repeat CT scan   Patient completed several weeks of intravenous antibiotics   No evidence of a new abscess   -No additional antibiotics for now  -Surgery follow-up  -Serial exams     3   ESBL E  coli bacteremia/abscess formation   Status post prolonged course of intravenous antibiotics with clearance of the bacteremia   Abscess is improved as above as per repeat imaging  -No directed antibiotics for this problem  -Drain management     4   Abdominal wound   No overt cellulitis or purulence seen although there is some exudate   Possible mild wound infection that is status post debridement by surgery   ESBL and MRSA   Status post more than a week of IV antibiotics  -Local wound care  -Serial abdominal exams  -Surgery follow-up  -Antibiotics as above     5   Metastatic colon cancer   Status post extensive surgical resection with reexploration in the setting of complication  Now with increasing liver mets  -Reconsult hematology oncology     6   Acute kidney injury   Suspect secondary to prerenal issues   No other clear source appreciated   Renal function improving and now stabilized   Renal function waxing and waning but is relatively stable  -Volume management    Discussed the above management plan with the primary service and they agree with the plan    We will see the patient again 2023 if not discharged  Please call for questions  Antibiotics:  Status post 10 days of antibiotics  Off antibiotics 8    Subjective:  Patient has no fever, chills, sweats; no nausea, vomiting, diarrhea; no cough, shortness of breath; no pain  No new symptoms  He is very tired and anxious to get his procedures done  Objective:  Vitals:  Temp:  [96 7 °F (35 9 °C)] 96 7 °F (35 9 °C)  HR:  [79] 79  Resp:  [19-20] 19  BP: (128-130)/(77-81) 130/77  SpO2:  [96 %] 96 %  Temp (24hrs), Av 7 °F (35 9 °C), Min:96 7 °F (35 9 °C), Max:96 7 °F (35 9 °C)  Current: Temperature: (!) 96 7 °F (35 9 °C)    Physical Exam:   General Appearance:  Alert, interactive, nontoxic, no acute distress  Throat: Oropharynx moist without lesions  Lungs:   Clear to auscultation bilaterally; no wheezes, rhonchi or rales; respirations unlabored   Heart:  RRR; no murmur, rub or gallop   Abdomen:   Soft, non-tender, non-distended, positive bowel sounds  Ostomy with output  Right-sided biliary drain in place with modest output    Wendy Figueroa drain in place  Incision dressed with a dry dressing in place  Extremities: No clubbing, cyanosis or edema   Skin: No new rashes or lesions  No draining wounds noted  Labs, Imaging, & Other studies:   All pertinent labs and imaging studies were personally reviewed  Results from last 7 days   Lab Units 02/24/23  0532 02/23/23  0520 02/22/23  0458   WBC Thousand/uL 10 42* 11 94* 15 26*   HEMOGLOBIN g/dL 7 2* 8 1* 8 0*   PLATELETS Thousands/uL 301 316 362     Results from last 7 days   Lab Units 02/24/23  0532 02/23/23  0520 02/22/23  0458   SODIUM mmol/L 132* 132* 132*   POTASSIUM mmol/L 3 7 3 9 3 8   CHLORIDE mmol/L 101 101 102   CO2 mmol/L 20* 20* 20*   BUN mg/dL 14 16 15   CREATININE mg/dL 1 36* 1 45* 1 45*   EGFR ml/min/1 73sq m 56 52 52   CALCIUM mg/dL 7 6* 7 7* 7 7*   AST U/L 51*  --   --    ALT U/L 9*  --   --    ALK PHOS U/L 790*  --   --      Results from last 7 days   Lab Units 02/22/23  0959   BLOOD CULTURE  No Growth at 24 hrs  No Growth at 24 hrs

## 2023-02-24 NOTE — DISCHARGE SUMMARY
[address]  Discharge Summary - Sherle Agent 1965, 61 y o  male MRN: [id number]  Unit/Bed#: -01 Encounter: [id number]  Primary Care Provider: Rodolfo Soliman MD   Date and time admitted to hospital: 2/7/2023  3:01 PM    Nephrolithiasis  Lungodora Arnold [address] revealed 7 mm nonobstructing right intrarenal calculus  No hydronephrosis noted on CT a/p  Asymptomatic at this time  · Flomax  · Intake and output    Abnormal CT scan  Assessment & Plan  CT shows interval decrease in bilateral pleural effusions, however multiple small bilateral lung nodules are seen which are concerning for possible metastatic lung disease  Repeat imaging concerning for increase in pleural effusion, moderate on R    · Pt denies any sob, chest pain and is stable on RA  Wishes to hold off on thoracentesis at this time  · Patient reports having prior known lung nodules  · Oxygenating appropriately on room air; continue to monitor oxygenation  · Outpatient f/u    Elevated alkaline phosphatase level  Assessment & Plan  Chronically elevated likely in setting of known hepatic metastasis  · RUQ US Decompressed gallbladder around a cholecystostomy tube, limiting evaluation  Hepatomegaly  Heterogeneous echotexture of the liver in this patient with known metastatic disease  7 mm nonobstructing right intrarenal calculus  · Monitor with am CMP    Acute on chronic kidney failure [facility])  Assessment & Plan  Lab Results   Component Value Date    EGFR 56 02/24/2023    EGFR 52 02/23/2023    EGFR 52 02/22/2023    CREATININE 1 36 (H) 02/24/2023    CREATININE 1 45 (H) 02/23/2023    CREATININE 1 45 (H) 02/22/2023     · POA with creatinine 2 52; baseline 1-1 3  · Suspecting possible multifactorial cause in setting of dehydration and sepsis    · Improved, monitor off fluids  · Avoid nephrotoxic agents  · Steadily downtrending    Colon cancer metastasized to liver [facility])  Assessment & Plan  Follows w/ outpatient [facility] heme onc and palliative care   Extensive abdominal surgical history (11/7 ex lap w/ section 3 liver resection, and development of left upper quadrant hematoma and suspected leak from transverse colon anastomosis complication; 92/14 right hemicolectomy; 11/17 re-exploration w/partial descending colectomy; 12/8 IR percutaneous cholecystostomy tube and hepatectomy abscess drainage; 12/20 IR drains placed for perisplenic abscess)  · Patient does not wish to follow with [place] oncology anymore as it is too far of a drive; would like to establish with [facility] (already follows with Dr Riley Marques)  · See imaging results below  · Oncology consulted, appreciate recommendation  · Continue outpatient f/u    Anemia  Assessment & Plan  Recent Labs     02/22/23  0458 02/23/23  0520 02/24/23  0532   HGB 8 0* 8 1* 7 2*     · 2/16 received BT 1u PRBC  · No overt bleeding noted  · Monitor CBC, transfuse as needed      Benign essential hypertension  Assessment & Plan  BP stable  · Continue prehospital HTN medications  · Monitor BP per unit protocol    Type 2 diabetes mellitus without complication, with long-term current use of insulin ([facility] [address] )  Assessment & Plan    Lab Results   Component Value Date    HGBA1C 8 0 (H) 09/26/2022     · Home Lantus 8U qHS and humalog 4U TID w/meals   · Dose reduce to 5U qhs + 4 U TID   · 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   · Hypoglycemia protocol  · Diabetic diet     * Sepsis ([facility] [address] )  Assessment & Plan  Noted the development of foul-smelling drainage and increasing pain from his chronic abdominal wound  · As evidenced by tachycardia, tachypnea, and leukocytosis  · In setting of purulent abdominal infection   · Hx of colon CA w/ extensive abd surgical hx as below   · Patient has several abdominal drains in place, with CT showing improvement in the fluid collections at the left liver lobe, perisplenic area, and the left retroperitoneum inferior to the spleen  · Completed IV Ertapenem and IV Vancomycin  · ID consulted; recommendations appreciated   · S/p bedside midline wound debridement + wet dressing 2/8 with general surgery  · S/p IR tube check 2/17/2023, drains kept in place  Discussed with IR, follow up in 1 month  · Given ongoing low grade fever and worsening leukocytosis, ordered repeat blood cultures and repeat CT a/p    CT a/p showing moderate R pleural effusion, imaging concerning for cholecystitis  Discussed case with surgery, IR  IR will reposition drain today- Alvina tube check showed retraction of the alvina tube  Interval occlusion of the cystic duct  Dino-enteric fistula persisted  Exchange of alvina tube 2/24  · Currently continue to monitor off abx  · Awaiting placement      Medical Problems     Resolved Problems  Date Reviewed: 2/24/2023          Resolved    Hyperkalemia 2/19/2023     Resolved by  Demetrio Phelan MD    Hyponatremia 2/19/2023     Resolved by  Demetrio Phelan MD        Discharging Physician / Practitioner: Robert Tomas DO  PCP: Ana Senior MD  Admission Date:   Admission Orders (From admission, onward)     Ordered        02/07/23 1959  INPATIENT ADMISSION  Once                      Discharge Date: 02/24/23    Consultations During Hospital Stay:  · Surgery  · Infectious disease  · Hematology/oncology  · Interventional radiology      Procedures Performed:   · Bedside wound debridement  · IR tube change repositioning on 2/10 and 2/16  · IR cholecystostomy tube check and repositioning, IR tube removal of drain for abscess    Significant Findings / Test Results:   · Worsening of moderate right and small left pleural effusion  · To rule progression/worsening of heterogeneous low-density masses of liver  Likely metastases    Incidental Findings:   · See above  · I reviewed the above mentioned incidental findings with the patient and/or family and they expressed understanding      Test Results Pending at Discharge (will require follow up):   · n/a     Outpatient Tests Requested:  · Cystostomy tube check    Complications:  none    Reason for Admission: decline in health state    Hospital Course:   Felton Marx is a 61 y o  male patient who originally presented to the hospital on 2/7/2023 due to generalized fatigue weakness  Noted to be septic on admission, likely in setting of worsening midline wound with foul-smelling drainage  Patient had several abdominal drains in place, CT showing improvement of fluid collection at the left liver lobe, perisplenic area and left retroperitoneum inferior to the spleen  Cystostomy drain in place as well  Initiated on broad-spectrum IV antibiotics  ID consulted  Switch to ertapenem  Noted to have movement and overall symptoms and wound, with daily wound care  See if 10 days total of IV antibiotics  Awaiting placement  During this time patient noted to spike intermittent fevers as well as elevated WBC count  Repeat imaging concerning for cholecystitis  This was discussed with both surgery and IR, IR plan for cystostomy tube change  Pattock drain was removed on 2/24, cystostomy drain exchanged on 2/24  PT OT evaluated patient and recommended ARC  Excepted at [facility]  Able for discharge  Hematology/oncology was also consulted during the stay, set up for outpatient follow-up  Please see above list of diagnoses and related plan for additional information  Condition at Discharge: fair    Discharge Day Visit / Exam:   Subjective:  Pt denies fevers, chills, nausea, vomiting, worsening weakness  Notes improved appetite  Otherwise ros negative  Teofilo Damico to go to rehab today     Vitals: Blood Pressure: 128/81 (02/24/23 1014)  Pulse: 79 (02/24/23 1014)  Temperature: (!) 96 7 °F (35 9 °C) (02/24/23 0700)  Temp Source: Oral (02/22/23 1902)  Respirations: 12 (02/24/23 0943)  Height: 5' 10" (177 8 cm) (02/07/23 2223)  Weight - Scale: 88 5 kg (195 lb) (02/07/23 2223)  SpO2: 97 % (02/24/23 1014)  Exam:   Physical Exam  Vitals reviewed  Constitutional:       General: He is not in acute distress  Appearance: He is well-developed  He is not diaphoretic  HENT:      Head: Normocephalic and atraumatic  Mouth/Throat:      Pharynx: No oropharyngeal exudate  Eyes:      General: No scleral icterus  Extraocular Movements: Extraocular movements intact  Conjunctiva/sclera: Conjunctivae normal    Neck:      Vascular: No JVD  Trachea: No tracheal deviation  Cardiovascular:      Rate and Rhythm: Normal rate and regular rhythm  Heart sounds: No murmur heard  No friction rub  No gallop  Pulmonary:      Effort: Pulmonary effort is normal  No respiratory distress  Breath sounds: No stridor  No wheezing  Abdominal:      General: There is no distension  Palpations: Abdomen is soft  There is no mass  Tenderness: There is no abdominal tenderness  There is no right CVA tenderness or left CVA tenderness  Comments: New cystostomy drain noted  Musculoskeletal:         General: No tenderness  Right lower leg: No edema  Left lower leg: No edema  Comments: Generalized weakness   Skin:     General: Skin is warm and dry  Coloration: Skin is pale  Findings: No erythema  Neurological:      Mental Status: He is alert and oriented to person, place, and time  Psychiatric:         Behavior: Behavior normal          Thought Content: Thought content normal           Discussion with Family: Attempted to update  (wife) via phone  Unable to contact  Discharge instructions/Information to patient and family:   See after visit summary for information provided to patient and family  Provisions for Follow-Up Care:  See after visit summary for information related to follow-up care and any pertinent home health orders  Disposition:   Acute Rehab at [facility]    Planned Readmission: none     Discharge Statement:  I spent 65 minutes discharging the patient   This time was spent on the day of discharge  I had direct contact with the patient on the day of discharge  Greater than 50% of the total time was spent examining patient, answering all patient questions, arranging and discussing plan of care with patient as well as directly providing post-discharge instructions  Additional time then spent on discharge activities  Discharge Medications:  See after visit summary for reconciled discharge medications provided to patient and/or family        **Please Note: This note may have been constructed using a voice recognition system**

## 2023-02-24 NOTE — ASSESSMENT & PLAN NOTE
Recent Labs     02/22/23  0458 02/23/23  0520 02/24/23  0532   HGB 8 0* 8 1* 7 2*     · 2/16 received BT 1u PRBC  · No overt bleeding noted  · Monitor CBC, transfuse as needed

## 2023-02-24 NOTE — PLAN OF CARE
Problem: PAIN - ADULT  Goal: Verbalizes/displays adequate comfort level or baseline comfort level  Description: Interventions:  - Encourage patient to monitor pain and request assistance  - Assess pain using appropriate pain scale  - Administer analgesics based on type and severity of pain and evaluate response  - Implement non-pharmacological measures as appropriate and evaluate response  - Consider cultural and social influences on pain and pain management  - Notify physician/advanced practitioner if interventions unsuccessful or patient reports new pain  Outcome: Progressing     Problem: INFECTION - ADULT  Goal: Absence or prevention of progression during hospitalization  Description: INTERVENTIONS:  - Assess and monitor for signs and symptoms of infection  - Monitor lab/diagnostic results  - Monitor all insertion sites, i e  indwelling lines, tubes, and drains  - Monitor endotracheal if appropriate and nasal secretions for changes in amount and color  - [place] appropriate cooling/warming therapies per order  - Administer medications as ordered  - Instruct and encourage patient and family to use good hand hygiene technique  - Identify and instruct in appropriate isolation precautions for identified infection/condition  Outcome: Progressing  Goal: Absence of fever/infection during neutropenic period  Description: INTERVENTIONS:  - Monitor WBC    Outcome: Progressing     Problem: SAFETY ADULT  Goal: Patient will remain free of falls  Description: INTERVENTIONS:  - Educate patient/family on patient safety including physical limitations  - Instruct patient to call for assistance with activity   - Consult OT/PT to assist with strengthening/mobility   - Keep Call bell within reach  - Keep bed low and locked with side rails adjusted as appropriate  - Keep care items and personal belongings within reach  - Initiate and maintain comfort rounds  - Make Fall Risk Sign visible to staff  - Offer Toileting every Hours, in advance of need  - Initiate/Maintain alarm  - Obtain necessary fall risk management equipment  - Apply yellow socks and bracelet for high fall risk patients  - Consider moving patient to room near nurses station  Outcome: Progressing  Goal: Maintain or return to baseline ADL function  Description: INTERVENTIONS:  -  Assess patient's ability to carry out ADLs; assess patient's baseline for ADL function and identify physical deficits which impact ability to perform ADLs (bathing, care of mouth/teeth, toileting, grooming, dressing, etc )  - Assess/evaluate cause of self-care deficits   - Assess range of motion  - Assess patient's mobility; develop plan if impaired  - Assess patient's need for assistive devices and provide as appropriate  - Encourage maximum independence but intervene and supervise when necessary  - Involve family in performance of ADLs  - Assess for home care needs following discharge   - Consider OT consult to assist with ADL evaluation and planning for discharge  - Provide patient education as appropriate  Outcome: Progressing  Goal: Maintains/Returns to pre admission functional level  Description: INTERVENTIONS:  - Perform BMAT or MOVE assessment daily    - Set and communicate daily mobility goal to care team and patient/family/caregiver     - Collaborate with rehabilitation services on mobility goals if consulted  - Perform Range of Motion  - Out of bed to ch  - Out of bed for me  - Out of bed for toileting  - Record patient progress and toleration of activity level   Outcome: Progressing     Problem: DISCHARGE PLANNING  Goal: Discharge to home or other facility with appropriate resources  Description: INTERVENTIONS:  - Identify barriers to discharge w/patient and caregiver  - Arrange for needed discharge resources and transportation as appropriate  - Identify discharge learning needs (meds, wound care, etc )  - Arrange for interpretive services to assist at discharge as needed  - Refer to Case Management Department for coordinating discharge planning if the patient needs post-hospital services based on physician/advanced practitioner order or complex needs related to functional status, cognitive ability, or social support system  Outcome: Progressing

## 2023-02-24 NOTE — ASSESSMENT & PLAN NOTE
Noted the development of foul-smelling drainage and increasing pain from his chronic abdominal wound  · As evidenced by tachycardia, tachypnea, and leukocytosis  · In setting of purulent abdominal infection   · Hx of colon CA w/ extensive abd surgical hx as below   · Patient has several abdominal drains in place, with CT showing improvement in the fluid collections at the left liver lobe, perisplenic area, and the left retroperitoneum inferior to the spleen  · Completed IV Ertapenem and IV Vancomycin  · ID consulted; recommendations appreciated   · S/p bedside midline wound debridement + wet dressing 2/8 with general surgery  · S/p IR tube check 2/17/2023, drains kept in place  Discussed with IR, follow up in 1 month  · Given ongoing low grade fever and worsening leukocytosis, ordered repeat blood cultures and repeat CT a/p    CT a/p showing moderate R pleural effusion, imaging concerning for cholecystitis  Discussed case with surgery, IR  IR will reposition drain today- Radha tube check showed retraction of the radha tube  Interval occlusion of the cystic duct  Dino-enteric fistula persisted   Exchange of radha tube 2/24  · Currently continue to monitor off abx  · Awaiting placement

## 2023-02-24 NOTE — ASSESSMENT & PLAN NOTE
CT shows interval decrease in bilateral pleural effusions, however multiple small bilateral lung nodules are seen which are concerning for possible metastatic lung disease  Repeat imaging concerning for increase in pleural effusion, moderate on R    · Pt denies any sob, chest pain and is stable on RA  Wishes to hold off on thoracentesis at this time     · Patient reports having prior known lung nodules  · Oxygenating appropriately on room air; continue to monitor oxygenation  · Outpatient f/u

## 2023-02-24 NOTE — ASSESSMENT & PLAN NOTE
Lab Results   Component Value Date    EGFR 56 02/24/2023    EGFR 52 02/23/2023    EGFR 52 02/22/2023    CREATININE 1 36 (H) 02/24/2023    CREATININE 1 45 (H) 02/23/2023    CREATININE 1 45 (H) 02/22/2023     · POA with creatinine 2 52; baseline 1-1 3  · Suspecting possible multifactorial cause in setting of dehydration and sepsis    · Improved, monitor off fluids  · Avoid nephrotoxic agents  · Steadily downtrending

## 2023-02-24 NOTE — OCCUPATIONAL THERAPY NOTE
Occupational Therapy Cancellation Note     Patient Name: Sushant Tenorio  IBNRJ'B Date: 2/24/2023  Problem List  Principal Problem:    Sepsis [facility])  Active Problems:    Type 2 diabetes mellitus without complication, with long-term current use of insulin (HCC)    Benign essential hypertension    Anemia    Colon cancer metastasized to liver [facility])    Acute on chronic kidney failure (HCC)    Elevated alkaline phosphatase level    Abnormal CT scan    Nephrolithiasis        02/24/23 0934   OT Last Visit   OT Visit Date 02/24/23   Note Type   Note Type Cancelled Session   Cancel Reasons Patient off floor/test       OT order active and chart review performed  At this time, OT treatment cancelled due to patient off floor at IR procedure  OT will follow and provide skilled interventions as appropriate        REMY Farrell, OTR/L

## 2023-02-24 NOTE — BRIEF OP NOTE (RAD/CATH)
INTERVENTIONAL RADIOLOGY PROCEDURE NOTE    Date: 2/24/2023    Procedure:   Port check  Tube Check  Alvina tube exchange    Preoperative diagnosis:   1  Sepsis ([facility] )    2  Wound infection    3  Acute kidney injury ([facility] )    4  Sepsis, due to unspecified organism, unspecified whether acute organ dysfunction present ([place] [address] )    5  Acute renal failure superimposed on stage 3a chronic kidney disease, unspecified acute renal failure type ([place] [address] )    6  Colostomy prolapse ([place] [address] )    7  Colon cancer metastasized to liver ([place] [address] )    8  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)         Postoperative diagnosis: Same  Surgeon: Meme Zhong MD     Assistant: None  No qualified resident was available  Blood loss: 5 ml    Specimens:  None  Findings:   Port check showed probable small thrombus at tip of cathter  This was flushed and resulted in improved aspiration of the port  Abscessogram of midline drain showed minimal persistent collection  Catheter removed  Lavina tube check showed retraction of the alvina tube  Interval occlusion of the cystic duct  Dino-enteric fistula persisted  Exchange of alvina tube  Complications: None immediate      Anesthesia: conscious sedation

## 2023-02-24 NOTE — PROGRESS NOTES
PHYSICAL MEDICINE AND REHABILITATION   PREADMISSION ASSESSMENT     Projected [facility] Diagnoses:  Impairment of mobility, safety and Activities of Daily Living (ADLs) due to Debility:  16  Debility (Non-cardiac/Non-pulmonary)  Etiologic: debility secondary to sepsis  Date of Onset: 2/7/23   Date of surgery: n/a    PATIENT INFORMATION  Name: Martín Alva Phone #: [phone] (home)   Address: [address] [place] [address]  YOB: 1965 Age: 61 y o  SS#   Marital Status: /Civil Union  Ethnicity: White  Employment Status: on disability  Extended Emergency Contact Information  Primary Emergency Contact: Vika Fuentes  Address: [address] [facility]  Mobile Phone: [phone]  Relation: Spouse  Secondary Emergency Contact: Aayushstone Roy  Mobile Phone: [phone]  Relation: Brother  Advance Directive: Level 1 Full Code (no ACP docs)    INSURANCE/COVERAGE:     Primary Payor: Rajan Gasmen / Plan: Rajan [employer]men / Product Type: HMO Commercial /  #[id number] Secondary Payer: Self Pay   Payer Contact: Bill Adame Payer Contact:   Contact Phone: [phone]   [phone] Fax: [phone] Contact Phone:     Authorization #: [id number]  Coverage Dates: 2/24/23-3/1/23  LCD: 3/1/23 Fax updates to [phone]  Medical Record #: [id number]    REFERRAL SOURCE:    Referring provider: Keven Devlin DO  Referring facility: [address] [place] [address]  Room: /-01  PCP: Rodolfo Soliman MD PCP phone number: [phone]    MEDICAL INFORMATION  HPI:   This is a 58-year-old male with a PMH of colon CA w/extensive abdominal surgery (11/7 ex lap w/ section 3 liver resection, and development of left upper quadrant hematoma and suspected leak from transverse colon anastomosis complication; 43/73 right hemicolectomy; 11/17 re-exploration w/partial descending colectomy; 12/8 IR percutaneous cholecystostomy tube and hepatectomy abscess drainage; 12/20 IR drains placed for perisplenic abscess), T2DM, CKD, HTN, and HLD who presented to [facility] ER on 2/7/23 with increasing fatigue and generalized weakness as well as development of foul-smelling drainage and increasing pain from his chronic abdominal wound  Also with c/o mild SOB  Patient was discharged from [facility] on 1/17 after a prolonged hospitalization  During current hospitalization, he was found to be septic in the setting of abdominal infection  Abdominal wound was debrided at bedside on 2/8 and local wound care with wet dressing has been ordered for same  Wound cultures showed ESBL and MRSA  He completed a course of Ertapenem and Vancomycin  Patient also found to have ALEXY, hyperkalemia, hyponatremia, elevated alkaline phosphatase level-Chronically elevated likely in setting of known hepatic metastasis and RUQ US revealed a 7 mm nonobstructing right intrarenal calculus, however, no hydronephrosis was noted on CT a/p  Patient currently has several abdominal drains in place since last hospitalization  CT of chest, abdomen and pelvis showed improvement in the fluid collections at the left liver lobe, perisplenic area, and the left retroperitoneum inferior to the spleen  He is s/p IR tube check and drains were kept in place  Discussed with IR and plan is to f/u in 1 month  CT also showed interval decrease in bilateral pleural effusions, however multiple small bilateral lung nodules are seen which are concerning for possible metastatic lung disease  Patient reports having prior known lung nodules  On 2/16, patient hgb dropped to 6 8  He was transfused 1 unit PRBC and repeat hgb was 8 1  No overt bleeding monitored and hgb has remained stable  On 2/22, his white count was elevated and patient became febrile  Repeat CT of the abdomen pelvis with incompletely decompressed gallbladder and increasing pleural effusion on the right  Repeat blood cultures negative thus far  Consult interventional radiology for revision of the biliary drain and possible thoracentesis  Pt is now afebrile and WBC trending down  IR repositioned tube on 2/24  No thoracentesis  Needed because he is asymptomatic  Patient worked with PT and OT and recommended for rehab following his hospital stay  He has demonstrated that he can tolerate three hours of therapy, five days a week and is now medically cleared for discharge to [facility]  Past Medical History:   Past Surgical History: Allergies:     Past Medical History:   Diagnosis Date   • Abdominal pain 03/12/2022   • Acute renal failure ([facility] )     [id number] resolved   • Acute respiratory failure with hypoxia ([facility] ) 11/12/2022   • Bacteremia 11/12/2022   • Cancer ([place] [address] )    • Diabetes mellitus ([place] [address] )    • Enteritis 08/23/2016   • Flash pulmonary edema ([facility] ) 11/12/2022   • Gastroparesis due to DM ([facility] ) 08/23/2016   • GERD (gastroesophageal reflux disease)    • Hernia, ventral 08/04/2016   • Hyperlipidemia    • Hypertension    • Morbid obesity ([facility] ) 04/17/2018   • Postoperative visit 03/02/2022   • SIRS (systemic inflammatory response syndrome) ([place] [address] ) 03/12/2022   • Snoring    • Stage 3a chronic kidney disease ([place] [address] ) 02/19/2022    Past Surgical History:   Procedure Laterality Date   • COLONOSCOPY     • COLOSTOMY N/A 02/20/2022    Procedure: COLOSTOMY LOOP, diverting;  Surgeon: Linette Fish MD;  Location: [facility];  Service: General   • ESOPHAGOGASTRODUODENOSCOPY N/A 08/24/2016    Procedure: ESOPHAGOGASTRODUODENOSCOPY (EGD); Surgeon: Peace Gutierrez MD;  Location: [facility] GI LAB;   Service:    • EXPLORATORY LAPAROTOMY W/ BOWEL RESECTION N/A 11/16/2022    Procedure: LAPAROTOMY EXPLORATORY W/ BOWEL RESECTION- VAC PLACEMENT;  Surgeon: Viraj [place] MD;  Location:  MAIN OR;  Service: Surgical Oncology   • EXPLORATORY LAPAROTOMY W/ BOWEL RESECTION N/A 11/17/2022    Procedure: LAPAROTOMY EXPLORATORY W/ BOWEL RESECTION;  Surgeon: Kale Collins MD;  Location: [facility] MAIN OR;  Service: Surgical Oncology   • ILEOSTOMY N/A 11/17/2022    Procedure: ILEOSTOMY;  Surgeon: Kale Collins MD;  Location: [facility] MA[facility];  Service: Surgical Oncology   • ILEOSTOMY CLOSURE N/A 11/7/2022    Procedure: REVERSAL COLOSTOMY;  Surgeon: Rafi More MD;  Location: [facility];  Service: Surgical Oncology   • IR CHOLECYSTOSTOMY TUBE CHECK/CHANGE/REPOSITION/REINSERTION/UPSIZE  12/12/2022   • IR CHOLECYSTOSTOMY TUBE PLACEMENT  12/8/2022   • IR DRAINAGE TUBE CHECK AND/OR REMOVAL  1/3/2023   • IR DRAINAGE TUBE CHECK/CHANGE/REPOSITION/REINSERTION/UPSIZE  1/12/2023   • IR DRAINAGE TUBE CHECK/CHANGE/REPOSITION/REINSERTION/UPSIZE  1/31/2023   • IR DRAINAGE TUBE CHECK/CHANGE/REPOSITION/REINSERTION/UPSIZE  2/10/2023   • IR DRAINAGE TUBE CHECK/CHANGE/REPOSITION/REINSERTION/UPSIZE  2/16/2023   • IR DRAINAGE TUBE PLACEMENT  12/8/2022   • IR DRAINAGE TUBE PLACEMENT  12/20/2022   • IR PORT PLACEMENT  03/10/2022   • IR THORACENTESIS  1/12/2023   • KIDNEY STONE SURGERY     • LIVER BIOPSY LAPAROSCOPIC N/A 02/20/2022    Procedure: DIAGNOSTIC LAPAROSCOPIC LIVER BIOPSY, DIVERTING LOOP COLOSTOMY ;  Surgeon: Eddi Stapleton MD;  Location: [facility];  Service: General   • LIVER LOBECTOMY N/A 11/7/2022    Procedure: SEGMENT 3 LIVER RESECTION, ABLATION, INTRAOPERATIVE U/S OF LIVER, PERITONEAL BIOPSY;  Surgeon: Rafi More MD;  Location: [facility];  Service: Surgical Oncology   • RIGHT COLON RESECTION N/A 11/7/2022    Procedure: EX LAP, TRANVERSE COLON RESECTION;  Surgeon: Rafi More MD;  Location: [facility];  Service: Surgical Oncology   • TONSILLECTOMY     • UMBILICAL HERNIA REPAIR LAPAROSCOPIC N/A 04/26/2019    Procedure: LAPAROSCOPIC UMBILICAL HERNIA REPAIR;  Surgeon: Eddi Stapleton MD;  Location: [facility];  Service: General   • VAC DRESSING APPLICATION N/A 73/71/2211    Procedure: APPLICATION VAC DRESSING ABDOMEN/TRUNK;  Surgeon: Kale Collins MD;  Location: [facility]; Service: Surgical Oncology     Allergies   Allergen Reactions   • Shellfish-Derived Products - Food Allergy Anaphylaxis   • Erythromycin GI Intolerance         Medical/functional conditions requiring inpatient rehabilitation:   Sepsis  Chronic abdominal wound with foul smelling drainage s/p bedside debridement  Intra-abdominal abscesses  ESBL and E coli bacteremia/abscess formation  ALEXY  Nephrolithiasis  Bilateral pleural effusions  Elevated alkaline phosphatase level  Hypokalemia  Hyponatremia  Colon cancer with mets to liver  Anemia  HTN  Type 2 DM  Hyperlipidemia    Risk for medical/clinical complications: risk for falls and injury related to falls, risk for skin breakdown, risk for sepsis, risk for further complications with abdominal wound, risk for electrolyte imbalance, risk for anemia, risk for DVT/PE/CVA, risk for complications with drains    Comorbidities/Surgeries in the last 100 days:   11/7/22-ex lap w/ section 3 liver resection, and development of left upper quadrant hematoma and suspected leak from transverse colon anastomosis complication  87/97/79-AVPZP hemicolectomy  11/17/2255-ja-gtbmolliyts w/partial descending colectomy  12/8/22-IR percutaneous cholecystostomy tube and hepatectomy abscess drainage  12/20/22-IR drains placed for perisplenic abscess  2/8/23-bedside debridement of abdominal wound  Chronic abdominal wound  Colon cancer with mets to liver  HTN  Type 2 DM  Hyperlipidemia  H/o flash pulmonary edema  GERD  Gastroparesis  Stage 3 kidney disease  H/o bacteremia    CURRENT VITAL SIGNS:   Temp:  [96 7 °F (35 9 °C)] 96 7 °F (35 9 °C)  HR:  [75-79] 79  Resp:  [12-20] 12  BP: (128-147)/(77-88) 128/81   Intake/Output Summary (Last 24 hours) at 2/24/2023 1054  Last data filed at 2/24/2023 0900  Gross per 24 hour   Intake 240 ml   Output 3435 ml   Net -3195 ml        LABORATORY RESULTS:      Lab Results   Component Value Date    HGB 7 2 (L) 02/24/2023    HGB 13 6 07/21/2017    HCT 23 0 (L) 02/24/2023    HCT 39 3 07/21/2017    WBC 10 42 (H) 02/24/2023    WBC 10 8 07/21/2017     Lab Results   Component Value Date    BUN 14 02/24/2023    BUN 21 09/26/2022     05/02/2017    K 3 7 02/24/2023    K 4 1 09/26/2022     02/24/2023     09/26/2022    GLUCOSE 151 (H) 11/16/2022    GLUCOSE 118 (H) 05/02/2017    CREATININE 1 36 (H) 02/24/2023    CREATININE 1 18 05/02/2017     Lab Results   Component Value Date    PROTIME 16 1 (H) 02/07/2023    INR 1 32 (H) 02/07/2023        DIAGNOSTIC STUDIES:  CT chest abdomen pelvis wo contrast    Result Date: 2/7/2023  Impression: 1  Interval decrease in bilateral pleural effusions with development of multiple small bilateral lung nodules concerning for metastatic lung disease  2   Fluid collection at the level of the left lobe of the liver is smaller with resolution of the other perisplenic fluid collections and improvement in collections in the left retroperitoneum inferior to the spleen  The study was marked in [facility] for immediate notification  Workstation performed: [id number]     US right upper quadrant    Result Date: 2/8/2023  Impression: Decompressed gallbladder around a cholecystostomy tube, limiting evaluation  Hepatomegaly  Heterogeneous echotexture of the liver in this patient with known metastatic disease  7 mm nonobstructing right intrarenal calculus  Workstation performed: [id number]     IR drainage tube check/change/reposition/reinsertion/upsize    Result Date: 2/10/2023  Impression: Impression: Cholecystostomy is identified within the gallbladder however small bowel opacifies prior to good opacification of the cystic and common ducts raising question of a choleenteric fistula  Abscess catheter in good position however output is persistent   Follow-up tube check in approximately one week Workstation performed: [id number]       PRECAUTIONS/SPECIAL NEEDS:  Isolation Precautions:  Contact, Blood Sugar Management: as per MD orders, Pain Management, Dietary Restrictions: regular diet with nutrition supplements, Visually Impaired, Language Preference: English and fall precautions, patient has cholecystostomy tube, abdominal abscess drain and ileostomy       MEDICATIONS:     Current Facility-Administered Medications:   •  acetaminophen (TYLENOL) tablet 650 mg, 650 mg, Oral, Q4H PRN, Low Benson PA-C, 650 mg at 02/21/23 2334  •  amLODIPine (NORVASC) tablet 2 5 mg, 2 5 mg, Oral, Daily, Low Benson PA-C, 2 5 mg at 02/23/23 3027  •  aspirin chewable tablet 81 mg, 81 mg, Oral, Daily, Low Benson PA-C, 81 mg at 02/23/23 2325  •  atorvastatin (LIPITOR) tablet 40 mg, 40 mg, Oral, Daily, Low Benson PA-C, 40 mg at 02/23/23 9671  •  collagenase (SANTYL) ointment, , Topical, Daily, Mario Maldonado PA-C, Given at 02/22/23 0932  •  DULoxetine (CYMBALTA) delayed release capsule 30 mg, 30 mg, Oral, Daily, Low Benson PA-C, 30 mg at 02/23/23 2449  •  heparin (porcine) subcutaneous injection 5,000 Units, 5,000 Units, Subcutaneous, Q8H [facility], Low Benson PA-C, 5,000 Units at 02/16/23 0530  •  influenza vaccine, recombinant, quadrivalent (FLUBLOK) IM injection 0 5 mL, 0 5 mL, Intramuscular, Prior to discharge, Anisha Lomeli MD  •  insulin glargine (LANTUS) subcutaneous injection 5 Units 0 05 mL, 5 Units, Subcutaneous, HS, Kiara Wang PA-C, 5 Units at 02/23/23 2150  •  insulin lispro (HumaLOG) 100 units/mL subcutaneous injection 1-6 Units, 1-6 Units, Subcutaneous, TID AC, 1 Units at 02/23/23 1700 **AND** Fingerstick Glucose (POCT), , , 4x Daily AC and at bedtime, Low Benson PA-C  •  insulin lispro (HumaLOG) 100 units/mL subcutaneous injection 1-6 Units, 1-6 Units, Subcutaneous, HS, Low Benson PA-C, 2 Units at 02/22/23 2107  •  insulin lispro (HumaLOG) 100 units/mL subcutaneous injection 4 Units, 4 Units, Subcutaneous, TID With Meals, Low Benson PA-C, 4 Units at 02/23/23 1730  •  melatonin tablet 6 mg, 6 mg, Oral, HS PRN, Heber Prima A Breanna Nassar PA-C, 6 mg at 02/23/23 2150  •  methocarbamol (ROBAXIN) tablet 500 mg, 500 mg, Oral, BID PRN, Chavez Sims PA-C, 500 mg at 02/07/23 2118  •  ondansetron [facility]) injection 4 mg, 4 mg, Intravenous, Q4H PRN, Nelia Kenzie Saraiya, DO, 4 mg at 02/11/23 2129  •  oxyCODONE (ROXICODONE) IR tablet 5 mg, 5 mg, Oral, Q4H PRN, Nelia Kenzie Saraiya, DO, 5 mg at 02/23/23 2154  •  oxyCODONE (ROXICODONE) split tablet 2 5 mg, 2 5 mg, Oral, Q4H PRN, Nelia Kenzie Saraiya, DO  •  pantoprazole (PROTONIX) EC tablet 40 mg, 40 mg, Oral, BID, Chavez Sims PA-C, 40 mg at 02/23/23 1729  •  pneumococcal 13-valent conjugate vaccine (PREVNAR-13) IM injection 0 5 mL, 0 5 mL, Intramuscular, Prior to discharge, Patricia Pantoja MD  •  simethicone (MYLICON) chewable tablet 80 mg, 80 mg, Oral, 4x Daily (with meals and at bedtime), Chavez Sims PA-C, 80 mg at 02/23/23 2150  •  sodium chloride (PF) 0 9 % injection 10 mL, 10 mL, Intravenous, Daily, Neliapaulette Ambrosio, DO, 10 mL at 02/22/23 0932  •  tamsulosin (FLOMAX) capsule 0 4 mg, 0 4 mg, Oral, Daily With Dinner, Chavez Sims PA-C, 0 4 mg at 02/23/23 1730    SKIN INTEGRITY:   Chronic abdominal wound with abscess requiring flushing with NS, amber thick Santyl on slough and 4x4 moistened with NS covered with abd  Ileostomy  cholecystostomy tube  abdominal abscess drain     PRIOR LEVEL OF FUNCTION:  He lives in a(n) single family home  Darrion Grant is  and lives with their family  Self Care: Needed some help, Indoor Mobility: ambulated with a RW following discharge from rehab on 1/17/23 and Cognition: Independent    FALLS IN THE LAST 6 MONTHS: 2    HOME ENVIRONMENT:  The living area: Patient lives in a multi-level home  He is able to live on the main level where there is a 1/2 bath and bedroom  He was sponge bathing prior to be admitted to the hospital and was not using the bathroom  Patient has an ileostomy  There are 3 steps to enter the home      The patient will not have 24 hour supervision/physical assistance available upon discharge  PREVIOUS DME:  Equipment in home (previous DME): Manual Wheelchair and walker    FUNCTIONAL STATUS:  Physical Therapy Occupational Therapy Speech Therapy   02/23/23 1011    PT Last Visit   PT Visit Date 02/23/23   Note Type   Note Type Treatment   Pain Assessment   Pain Assessment Tool 0-10   Pain Score No Pain   Restrictions/Precautions   Weight Bearing Precautions Per Order No   Braces or Orthoses Other (Comment)  (none reported)   Other Precautions Fall Risk;Pain;Multiple lines   General   Chart Reviewed Yes   Response to Previous Treatment Patient with no complaints from previous session  Family/Caregiver Present No   Cognition   Overall Cognitive Status WFL   Arousal/Participation Alert; Cooperative   Attention Within functional limits   Orientation Level Oriented X4   Memory Within functional limits   Following Commands Follows all commands and directions without difficulty   Comments Pt agreeable to PT   Bed Mobility   Supine to Sit 5  Supervision   Additional items Assist x 1;Bedrails;HOB elevated; Increased time required;Verbal cues   Additional Comments Pt received supine in bed with HOB elevated  Following session, pt remained in recliner with needs met, call bell within reach   Transfers   Sit to Stand 4  Minimal assistance   Additional items Assist x 1; Armrests; Increased time required;Verbal cues   Stand to Sit 4  Minimal assistance   Additional items Assist x 1; Armrests; Increased time required;Verbal cues   Additional Comments with use of RW, VCs for hand placements   Ambulation/Elevation   Gait pattern Decreased foot clearance; Inconsistent farhan; Short stride;Decreased heel strike   Gait Assistance 4  Minimal assist  (CGA)   Additional items Assist x 1;Verbal cues   Assistive Device Rolling walker   Distance 40'   Stair Management Assistance 4  Minimal assist   Additional items Assist x 1;Verbal cues   Stair Management Technique With walker; Foreward;Backward  (6in practice step)   Number of Stairs 5   Balance   Static Sitting Fair +   Dynamic Sitting Fair   Static Standing Fair -   Dynamic Standing Fair -   Ambulatory Fair -   Endurance Deficit   Endurance Deficit Yes   Endurance Deficit Description decreased activity tolerance   Activity Tolerance   Activity Tolerance Patient limited by fatigue   Nurse Made Aware RN Marcy   Exercises   Hip Flexion Sitting;10 reps;AROM; Bilateral   Knee AROM Long Arc Quad Sitting;10 reps;AROM; Bilateral   Ankle Pumps Sitting;20 reps;AROM; Bilateral   Assessment   Prognosis Good   Problem List Decreased strength;Decreased endurance; Impaired balance;Decreased mobility; Decreased safety awareness;Pain   Assessment Pt seen for PT treatment session this date with interventions consisting of gait training w/ emphasis on improving pt's ability to ambulate level surfaces x 40' with min A provided by therapist with RW, Therapeutic exercise consisting of: BLE exercises seated at EOB, therapeutic activity consisting of training: bed mobility, supine<>sit transfers, sit<>stand transfers and vc and tactile cues for static standing posture faciliation and navigating 5 stairs w/ bilateral handrail with step to pattern with min A  Pt agreeable to PT treatment session upon arrival, pt found supine in bed w/ HOB elevated, in no apparent distress and responsive  In comparison to previous session, pt with improvements in dynamic balance and endurance  Post session: pt returned back to recliner, all needs in reach and RN notified of session findings/recommendations  Continue to recommend post acute rehabilitation services at time of d/c in order to maximize pt's functional independence and safety w/ mobility   Pt continues to be functioning below baseline level, and remains limited 2* factors listed above and including continued need for medical management and monitoring, decreased strength and balance resulting in an increased risk for falls, decreased endurance and activity tolerance limiting overall mobility  PT will continue to see pt during current hospitalization in order to address the deficits listed above and provide interventions consistent w/ POC in effort to achieve STGs       02/22/23 1125    OT Last Visit   OT Visit Date 02/22/23   Note Type   Note Type Treatment   Pain Assessment   Pain Assessment Tool 0-10   Pain Score No Pain   Restrictions/Precautions   Weight Bearing Precautions Per Order No   Other Precautions Contact/isolation; Fall Risk   ADL   Eating Assistance 5  Supervision/Setup   Eating Deficit Setup   Grooming Assistance 5  Supervision/Setup   Grooming Deficit Setup; Increased time to complete;Brushing hair;Wash/dry face  (while seated)   UB Bathing Assistance 4  Minimal Assistance   UB Bathing Deficit Setup;Supervision/safety; Increased time to complete  (while seated)   LB Bathing Assistance 4  Minimal Assistance   LB Bathing Deficit Setup;Supervision/safety; Increased time to complete  (pt able to perform crossover technique to reach lower legs and feet  Assist for balance while standing for james hygiene)   UB Dressing Assistance 4  Minimal Assistance   UB Dressing Deficit Setup;Supervision/safety; Increased time to complete  (while seated)   LB Dressing Assistance 4  Minimal Assistance   LB Dressing Deficit Setup;Supervision/safety; Increased time to complete; Requires assistive device for steadying   Toileting Assistance  5  Supervision/Setup   Toileting Deficit Setup  (+ ostomy)   Functional Standing Tolerance   Time ~1 minute   Activity standing during bathing   Comments Min A x1 with RW   Bed Mobility   Additional Comments Pt receieved OOB in bed side chair at start of session   Transfers   Sit to Stand 4  Minimal assistance   Additional items Assist x 1; Armrests; Increased time required  (with RW)   Stand to Sit 4  Minimal assistance   Additional items Assist x 1; Armrests; Increased time required Functional Mobility   Functional Mobility 4  Minimal assistance   Additional Comments assist x1   Subjective   Subjective Pt agreeable to OT treatment session   Cognition   Overall Cognitive Status [facility]   Arousal/Participation Alert; Cooperative   Attention Within functional limits   Orientation Level Oriented X4   Memory Within functional limits   Following Commands Follows all commands and directions without difficulty   Comments Pt highly motivated to participate with therapy, expressed he is looking forward to acute rehab to continue his functional progress   Activity Tolerance   Activity Tolerance Patient limited by fatigue   Medical Staff Made Aware Leonette Closs, [place] RN   Assessment   Assessment Pt seen this date for skilled OT session focused on ADLs, functional transfers and mobility, safety education  The patient was received seated in bed side chair, NAD, PIV access, 2 abdominal drains present  He participated in bathing, dressing, and grooming ADLs this session  Pt required Minimal Assistance to perform bathing, and Moderate Assistance for LB dressing  Supervision for grooming with set up while seated  At end of session the patient was located seated in bed side chair with call bell in reach and all needs met  Overall the patient continues to make functional gains towards goals  He remains below his functional baseline, and is primarily limited at this time due to generalized deconditioning and decreased activity tolerance  Pt is highly motivated to participate with therapy and regain functional independence  OT will continue to follow while acute to address POC  At this time, recommend acute inpatient rehab upon d/c             CARE SCORES:  Self Care:  Eatin: Supervision or touching  assistance  Oral hygiene: 04: Supervision or touching  assistance  Toilet hygiene: 04: Supervision or touching  assistance  Shower/bathing self: 03: Partial/moderate assistance  Upper body dressin: Partial/moderate assistance  Lower body dressin: Partial/moderate assistance  Putting on/taking off footwear: 09: Not applicable  Transfers:  Roll left and right: 09: Not applicable  Sit to lyin: Not applicable  Lying to sitting on side of bed: 04: Supervision or touching  assistance  Sit to stand: 04: Supervision or touching  assistance  Chair/bed to chair transfer: 03: Partial/moderate assistance  Toilet transfer: 03: Partial/moderate assistance  Mobility:  Walk 10 ft: 03: Partial/moderate assistance  Walk 50 ft with two turns: 03: Partial/moderate assistance  Walk [id number]: 09: Not applicable    CURRENT GAP IN FUNCTION  Prior to Admission: Functional Status: Left ARC on  He was able to progress to a S level using a RW, CGA on stairs, and (I) level with bed and w/c mobility  Min A for UB ADLs, Mod-Max LB ADLs, total assistance for footwear  Patient has ileostomy    Estimated length of stay: 10 to 14 days    Anticipated Post-Discharge Disposition/Treatment  Disposition: Return to previous home/apartment  Outpatient Services: Physical Therapy (PT) and Occupational Therapy (OT)    BARRIERS TO DISCHARGE  Home Accessibility and Caregiver Accessibility; Decreased ADL status; Decreased strength;Decreased endurance; Impaired balance;Decreased mobility; Decreased safety awareness;    INTERVENTIONS FOR DISCHARGE  ADL retraining;Functional transfer training; Endurance training;Patient/family training; Compensatory technique education;LE strengthening/ROM; Therapeutic exercise; Bed mobility;Gait training;Equipment eval/education    REQUIRED THERAPY:  Patient will require PT and OT 90 minutes each per day, five days per week to achieve rehab goals  REQUIRED FUNCTIONAL AND MEDICAL MANAGEMENT FOR INPATIENT REHABILITATION:  Skin:  Patient requires close monitoring of skin for any breakdown secondary to decreased mobility  He also requires close monitoring of drain and tube sites and abdominal wound  , Pain Management: Overall pain is moderately controlled, Deep Vein Thrombosis (DVT) Prophylaxis:  heparin and SCD's while in bed, Diabetes Management: continue sliding scale insulin, patient to do finger sticks as ordered, SLIM to continue to manage diabetes, and other medical conditions, PT and OT interventions  Any labs, consults, imaging and medication adjustments patient may need while on ARC    RECOMMENDED LEVEL OF CARE:    This is a 53-year-old male with a PMH of colon CA w/extensive abdominal surgery (11/7 ex lap w/ section 3 liver resection, and development of left upper quadrant hematoma and suspected leak from transverse colon anastomosis complication; 23/76 right hemicolectomy; 11/17 re-exploration w/partial descending colectomy; 12/8 IR percutaneous cholecystostomy tube and hepatectomy abscess drainage; 12/20 IR drains placed for perisplenic abscess), T2DM, CKD, HTN, and HLD who presented to [facility] ER on 2/7/23 with increasing fatigue and generalized weakness as well as development of foul-smelling drainage and increasing pain from his chronic abdominal wound  Also with c/o mild SOB  Patient was discharged from [facility] on 1/17 after a prolonged hospitalization  During current hospitalization, he was found to be septic in the setting of abdominal infection  Abdominal wound was debrided at bedside on 2/8 and local wound care with wet dressing has been ordered for same  Wound cultures showed ESBL and MRSA  He completed a course of Ertapenem and Vancomycin  Patient also found to have ALEXY, hyperkalemia, hyponatremia, elevated alkaline phosphatase level-Chronically elevated likely in setting of known hepatic metastasis and RUQ US revealed a 7 mm nonobstructing right intrarenal calculus, however, no hydronephrosis was noted on CT a/p  Patient currently has several abdominal drains in place since last hospitalization    CT of chest, abdomen and pelvis showed improvement in the fluid collections at the left liver lobe, perisplenic area, and the left retroperitoneum inferior to the spleen  He is s/p IR tube check and drains were kept in place  Discussed with IR and plan is to f/u in 1 month  CT also showed interval decrease in bilateral pleural effusions, however multiple small bilateral lung nodules are seen which are concerning for possible metastatic lung disease  Patient reports having prior known lung nodules  On 2/16, patient hgb dropped to 6 8  He was transfused 1 unit PRBC and repeat hgb was 8 1  No overt bleeding monitored and hgb has remained stable  On 2/22, his white count was elevated and patient became febrile  Repeat CT of the abdomen pelvis with incompletely decompressed gallbladder and increasing pleural effusion on the right  Repeat blood cultures negative thus far  Consult interventional radiology for revision of the biliary drain and possible thoracentesis  Pt is now afebrile and WBC trending down  IR repositioned tube on 2/24  No thoracentesis  Needed because he is asymptomatic  Left ARC on 1/17 He was able to progress to a S level using a RW, CGA on stairs, and (I) level with bed and w/c mobility  Min A for UB ADLs, Mod-Max LB ADLs, total assistance for footwear  Prior to patient's admission in December, patient was fully Independent with ADLs and IADLs, including driving  He was Independent without use of AD for mobility  Pt is currently needing Min A for transfers/amb and Min A for ADLs  Pt would benefit from [facility] admission to have close medical management while participating in 3 hours of therapy per day that will include physical and occupational therapy  Physical and occupational therapists will address functional mobility deficits and assist patient in improving their strength, endurance, ROM, and self care  Pt will have 24/7 nursing care to monitor routine vitals, I/Os, skin integrity, and overall condition   The rehab nursing staff will follow therapy recommendations to have 24 hour follow through during non-therapy hours  PM&R to maximize function and provide medical oversight  The MD will monitor patient co-morbidities while on the unit as well as order any additional tests, labs, and consults needed  The [facility] specialized interdisciplinary team will meet weekly to discuss patient overall medical status and rehab goals in preparation for D/C home  Inpatient acute rehab is recommended for patient to maximize overall strength, endurance, self care, and mobility for a safe and timely transition back home

## 2023-02-24 NOTE — PLAN OF CARE
Problem: PAIN - ADULT  Goal: Verbalizes/displays adequate comfort level or baseline comfort level  Description: Interventions:  - Encourage patient to monitor pain and request assistance  - Assess pain using appropriate pain scale  - Administer analgesics based on type and severity of pain and evaluate response  - Implement non-pharmacological measures as appropriate and evaluate response  - Consider cultural and social influences on pain and pain management  - Notify physician/advanced practitioner if interventions unsuccessful or patient reports new pain  Outcome: Progressing     Problem: INFECTION - ADULT  Goal: Absence or prevention of progression during hospitalization  Description: INTERVENTIONS:  - Assess and monitor for signs and symptoms of infection  - Monitor lab/diagnostic results  - Monitor all insertion sites, i e  indwelling lines, tubes, and drains  - Monitor endotracheal if appropriate and nasal secretions for changes in amount and color  - [place] appropriate cooling/warming therapies per order  - Administer medications as ordered  - Instruct and encourage patient and family to use good hand hygiene technique  - Identify and instruct in appropriate isolation precautions for identified infection/condition  Outcome: Progressing  Goal: Absence of fever/infection during neutropenic period  Description: INTERVENTIONS:  - Monitor WBC    Outcome: Progressing     Problem: SAFETY ADULT  Goal: Patient will remain free of falls  Description: INTERVENTIONS:  - Educate patient/family on patient safety including physical limitations  - Instruct patient to call for assistance with activity   - Consult OT/PT to assist with strengthening/mobility   - Keep Call bell within reach  - Keep bed low and locked with side rails adjusted as appropriate  - Keep care items and personal belongings within reach  - Initiate and maintain comfort rounds  - Make Fall Risk Sign visible to staff  - Offer Toileting every    Hours, in advance of need  - Initiate/Maintain   alarm  - Obtain necessary fall risk management equipment:     - Apply yellow socks and bracelet for high fall risk patients  - Consider moving patient to room near nurses station  Outcome: Progressing  Goal: Maintain or return to baseline ADL function  Description: INTERVENTIONS:  -  Assess patient's ability to carry out ADLs; assess patient's baseline for ADL function and identify physical deficits which impact ability to perform ADLs (bathing, care of mouth/teeth, toileting, grooming, dressing, etc )  - Assess/evaluate cause of self-care deficits   - Assess range of motion  - Assess patient's mobility; develop plan if impaired  - Assess patient's need for assistive devices and provide as appropriate  - Encourage maximum independence but intervene and supervise when necessary  - Involve family in performance of ADLs  - Assess for home care needs following discharge   - Consider OT consult to assist with ADL evaluation and planning for discharge  - Provide patient education as appropriate  Outcome: Progressing  Goal: Maintains/Returns to pre admission functional level  Description: INTERVENTIONS:  - Perform BMAT or MOVE assessment daily    - Set and communicate daily mobility goal to care team and patient/family/caregiver  - Collaborate with rehabilitation services on mobility goals if consulted  - Perform Range of Motion      times a day  - Reposition patient every    hours    - Dangle patient    times a day  - Stand patient    times a day  - Ambulate patient    times a day  - Out of bed to chair    times a day   - Out of bed for meals    times a day  - Out of bed for toileting  - Record patient progress and toleration of activity level   Outcome: Progressing     Problem: DISCHARGE PLANNING  Goal: Discharge to home or other facility with appropriate resources  Description: INTERVENTIONS:  - Identify barriers to discharge w/patient and caregiver  - Arrange for needed discharge resources and transportation as appropriate  - Identify discharge learning needs (meds, wound care, etc )  - Arrange for interpretive services to assist at discharge as needed  - Refer to Case Management Department for coordinating discharge planning if the patient needs post-hospital services based on physician/advanced practitioner order or complex needs related to functional status, cognitive ability, or social support system  Outcome: Progressing     Problem: Knowledge Deficit  Goal: Patient/family/caregiver demonstrates understanding of disease process, treatment plan, medications, and discharge instructions  Description: Complete learning assessment and assess knowledge base    Interventions:  - Provide teaching at level of understanding  - Provide teaching via preferred learning methods  Outcome: Progressing     Problem: SKIN/TISSUE INTEGRITY - ADULT  Goal: Skin Integrity remains intact(Skin Breakdown Prevention)  Description: Assess:  -Perform Michael assessment every     -Clean and moisturize skin every     -Inspect skin when repositioning, toileting, and assisting with ADLS  -Assess under medical devices such as    every     -Assess extremities for adequate circulation and sensation     Bed Management:  -Have minimal linens on bed & keep smooth, unwrinkled  -Change linens as needed when moist or perspiring  -Avoid sitting or lying in one position for more than      hours while in bed  -Keep HOB at   degrees     Toileting:  -Offer bedside commode  -Assess for incontinence every     -Use incontinent care products after each incontinent episode such as       Activity:  -Mobilize patient    times a day  -Encourage activity and walks on unit  -Encourage or provide ROM exercises   -Turn and reposition patient every    Hours  -Use appropriate equipment to lift or move patient in bed  -Instruct/ Assist with weight shifting every    when out of bed in chair  -Consider limitation of chair time    hour intervals    Skin Care:  -Avoid use of baby powder, tape, friction and shearing, hot water or constrictive clothing  -Relieve pressure over bony prominences using     -Do not massage red bony areas    Next Steps:  -Teach patient strategies to minimize risks such as      -Consider consults to  interdisciplinary teams such as     Outcome: Progressing  Goal: Incision(s), wounds(s) or drain site(s) healing without S/S of infection  Description: INTERVENTIONS  - Assess and document dressing, incision, wound bed, drain sites and surrounding tissue  - Provide patient and family education  - Perform skin care/dressing changes every     Outcome: Progressing  Goal: Pressure injury heals and does not worsen  Description: Interventions:  - Implement low air loss mattress or specialty surface (Criteria met)  - Apply silicone foam dressing  - Instruct/assist with weight shifting every    minutes when in chair   - Limit chair time to    hour intervals  - Use special pressure reducing interventions such as    when in chair   - Apply fecal or urinary incontinence containment device   - Perform passive or active ROM every       - Turn and reposition patient & offload bony prominences every    hours   - Utilize friction reducing device or surface for transfers   - Consider consults to  interdisciplinary teams such as     - Use incontinent care products after each incontinent episode such as       - Consider nutrition services referral as needed  Outcome: Progressing     Problem: Prexisting or High Potential for Compromised Skin Integrity  Goal: Skin integrity is maintained or improved  Description: INTERVENTIONS:  - Identify patients at risk for skin breakdown  - Assess and monitor skin integrity  - Assess and monitor nutrition and hydration status  - Monitor labs   - Assess for incontinence   - Turn and reposition patient  - Assist with mobility/ambulation  - Relieve pressure over bony prominences  - Avoid friction and shearing  - Provide appropriate hygiene as needed including keeping skin clean and dry  - Evaluate need for skin moisturizer/barrier cream  - Collaborate with interdisciplinary team   - Patient/family teaching  - Consider wound care consult   Outcome: Progressing     Problem: MOBILITY - ADULT  Goal: Maintain or return to baseline ADL function  Description: INTERVENTIONS:  -  Assess patient's ability to carry out ADLs; assess patient's baseline for ADL function and identify physical deficits which impact ability to perform ADLs (bathing, care of mouth/teeth, toileting, grooming, dressing, etc )  - Assess/evaluate cause of self-care deficits   - Assess range of motion  - Assess patient's mobility; develop plan if impaired  - Assess patient's need for assistive devices and provide as appropriate  - Encourage maximum independence but intervene and supervise when necessary  - Involve family in performance of ADLs  - Assess for home care needs following discharge   - Consider OT consult to assist with ADL evaluation and planning for discharge  - Provide patient education as appropriate  Outcome: Progressing  Goal: Maintains/Returns to pre admission functional level  Description: INTERVENTIONS:  - Perform BMAT or MOVE assessment daily    - Set and communicate daily mobility goal to care team and patient/family/caregiver  - Collaborate with rehabilitation services on mobility goals if consulted  - Perform Range of Motion    times a day  - Reposition patient every      hours    - Dangle patient    times a day  - Stand patient    times a day  - Ambulate patient    times a day  - Out of bed to chair    times a day   - Out of bed for meals    times a day  - Out of bed for toileting  - Record patient progress and toleration of activity level   Outcome: Progressing     Problem: Nutrition/Hydration-ADULT  Goal: Nutrient/Hydration intake appropriate for improving, restoring or maintaining nutritional needs  Description: Monitor and assess patient's nutrition/hydration status for malnutrition  Collaborate with interdisciplinary team and initiate plan and interventions as ordered  Monitor patient's weight and dietary intake as ordered or per policy  Utilize nutrition screening tool and intervene as necessary  Determine patient's food preferences and provide high-protein, high-caloric foods as appropriate       INTERVENTIONS:  - Monitor oral intake, urinary output, labs, and treatment plans  - Assess nutrition and hydration status and recommend course of action  - Evaluate amount of meals eaten  - Assist patient with eating if necessary   - Allow adequate time for meals  - Recommend/ encourage appropriate diets, oral nutritional supplements, and vitamin/mineral supplements  - Order, calculate, and assess calorie counts as needed  - Recommend, monitor, and adjust tube feedings and TPN/PPN based on assessed needs  - Assess need for intravenous fluids  - Provide specific nutrition/hydration education as appropriate  - Include patient/family/caregiver in decisions related to nutrition  Outcome: Progressing

## 2023-02-24 NOTE — SEDATION DOCUMENTATION
PORT ACCESSED PER THIS RN  SLUGGISH FLUSH- NO RETURN NOTED  CONTRAST INJECTED  HARD FLUSH PER MD   FLUSH WITH BLOOD RETURN NOTED

## 2023-02-24 NOTE — ASSESSMENT & PLAN NOTE
Follows w/ outpatient [facility] onc and palliative care   Extensive abdominal surgical history (11/7 ex lap w/ section 3 liver resection, and development of left upper quadrant hematoma and suspected leak from transverse colon anastomosis complication; 25/11 right hemicolectomy; 11/17 re-exploration w/partial descending colectomy; 12/8 IR percutaneous cholecystostomy tube and hepatectomy abscess drainage; 12/20 IR drains placed for perisplenic abscess)  · Patient does not wish to follow with [place] [place] oncology anymore as it is too far of a drive; would like to establish with [place] [id number] oncology (already follows with Dr Zach Jorgensen)  · See imaging results below  · Oncology consulted, appreciate recommendation  · Continue outpatient f/u

## 2023-02-24 NOTE — PLAN OF CARE
Problem: PAIN - ADULT  Goal: Verbalizes/displays adequate comfort level or baseline comfort level  Description: Interventions:  - Encourage patient to monitor pain and request assistance  - Assess pain using appropriate pain scale  - Administer analgesics based on type and severity of pain and evaluate response  - Implement non-pharmacological measures as appropriate and evaluate response  - Consider cultural and social influences on pain and pain management  - Notify physician/advanced practitioner if interventions unsuccessful or patient reports new pain  Outcome: Progressing     Problem: INFECTION - ADULT  Goal: Absence or prevention of progression during hospitalization  Description: INTERVENTIONS:  - Assess and monitor for signs and symptoms of infection  - Monitor lab/diagnostic results  - Monitor all insertion sites, i e  indwelling lines, tubes, and drains  - Monitor endotracheal if appropriate and nasal secretions for changes in amount and color  - [place] appropriate cooling/warming therapies per order  - Administer medications as ordered  - Instruct and encourage patient and family to use good hand hygiene technique  - Identify and instruct in appropriate isolation precautions for identified infection/condition  Outcome: Progressing  Goal: Absence of fever/infection during neutropenic period  Description: INTERVENTIONS:  - Monitor WBC    Outcome: Progressing     Problem: SAFETY ADULT  Goal: Patient will remain free of falls  Description: INTERVENTIONS:  - Educate patient/family on patient safety including physical limitations  - Instruct patient to call for assistance with activity   - Consult OT/PT to assist with strengthening/mobility   - Keep Call bell within reach  - Keep bed low and locked with side rails adjusted as appropriate  - Keep care items and personal belongings within reach  - Initiate and maintain comfort rounds  - Make Fall Risk Sign visible to staff  - Offer Toileting everHours, in advance of need  - Initiate/[place]arm  - Obtain necessary fall risk management equipme  - Apply yellow socks and bracelet for high fall risk patients  - Consider moving patient to room near nurses station  Outcome: Progressing  Goal: Maintain or return to baseline ADL function  Description: INTERVENTIONS:  -  Assess patient's ability to carry out ADLs; assess patient's baseline for ADL function and identify physical deficits which impact ability to perform ADLs (bathing, care of mouth/teeth, toileting, grooming, dressing, etc )  - Assess/evaluate cause of self-care deficits   - Assess range of motion  - Assess patient's mobility; develop plan if impaired  - Assess patient's need for assistive devices and provide as appropriate  - Encourage maximum independence but intervene and supervise when necessary  - Involve family in performance of ADLs  - Assess for home care needs following discharge   - Consider OT consult to assist with ADL evaluation and planning for discharge  - Provide patient education as appropriate  Outcome: Progressing  Goal: Maintains/Returns to pre admission functional level  Description: INTERVENTIONS:  - Perform BMAT or MOVE assessment daily    - Set and communicate daily mobility goal to care team and patient/family/caregiver  - Collaborate with rehabilitation services on mobility goals if consulted  - Perform Range es a day    - Reposition paurs   - Dangle pat  - Stand jj  - Ambulate p  - Out of beday   - Out of be  - Out of bed for toileting  - Record patient progress and toleration of activity level   Outcome: Progressing     Problem: DISCHARGE PLANNING  Goal: Discharge to home or other facility with appropriate resources  Description: INTERVENTIONS:  - Identify barriers to discharge w/patient and caregiver  - Arrange for needed discharge resources and transportation as appropriate  - Identify discharge learning needs (meds, wound care, etc )  - Arrange for interpretive services to assist at discharge as needed  - Refer to Case Management Department for coordinating discharge planning if the patient needs post-hospital services based on physician/advanced practitioner order or complex needs related to functional status, cognitive ability, or social support system  Outcome: Progressing

## 2023-02-25 LAB
ABO GROUP BLD: NORMAL
ANION GAP SERPL CALCULATED.3IONS-SCNC: 8 MMOL/L (ref 4–13)
BASOPHILS # BLD AUTO: 0.07 THOUSANDS/ÂΜL (ref 0–0.1)
BASOPHILS NFR BLD AUTO: 1 % (ref 0–1)
BLD GP AB SCN SERPL QL: NEGATIVE
BUN SERPL-MCNC: 16 MG/DL (ref 5–25)
CALCIUM SERPL-MCNC: 7.8 MG/DL (ref 8.3–10.1)
CHLORIDE SERPL-SCNC: 103 MMOL/L (ref 96–108)
CO2 SERPL-SCNC: 21 MMOL/L (ref 21–32)
CREAT SERPL-MCNC: 1.28 MG/DL (ref 0.6–1.3)
EOSINOPHIL # BLD AUTO: 0 THOUSAND/ÂΜL (ref 0–0.61)
EOSINOPHIL NFR BLD AUTO: 0 % (ref 0–6)
ERYTHROCYTE [DISTWIDTH] IN BLOOD BY AUTOMATED COUNT: 16.8 % (ref 11.6–15.1)
GFR SERPL CREATININE-BSD FRML MDRD: 61 ML/MIN/1.73SQ M
GLUCOSE P FAST SERPL-MCNC: 114 MG/DL (ref 65–99)
GLUCOSE SERPL-MCNC: 101 MG/DL (ref 65–140)
GLUCOSE SERPL-MCNC: 114 MG/DL (ref 65–140)
GLUCOSE SERPL-MCNC: 161 MG/DL (ref 65–140)
GLUCOSE SERPL-MCNC: 165 MG/DL (ref 65–140)
GLUCOSE SERPL-MCNC: 204 MG/DL (ref 65–140)
HCT VFR BLD AUTO: 23.6 % (ref 36.5–49.3)
HGB BLD-MCNC: 7.5 G/DL (ref 12–17)
IMM GRANULOCYTES # BLD AUTO: 0.11 THOUSAND/UL (ref 0–0.2)
IMM GRANULOCYTES NFR BLD AUTO: 1 % (ref 0–2)
LYMPHOCYTES # BLD AUTO: 1.65 THOUSANDS/ÂΜL (ref 0.6–4.47)
LYMPHOCYTES NFR BLD AUTO: 16 % (ref 14–44)
MCH RBC QN AUTO: 26.2 PG (ref 26.8–34.3)
MCHC RBC AUTO-ENTMCNC: 31.8 G/DL (ref 31.4–37.4)
MCV RBC AUTO: 83 FL (ref 82–98)
MONOCYTES # BLD AUTO: 1.14 THOUSAND/ÂΜL (ref 0.17–1.22)
MONOCYTES NFR BLD AUTO: 11 % (ref 4–12)
MRSA NOSE QL CULT: NORMAL
NEUTROPHILS # BLD AUTO: 7.26 THOUSANDS/ÂΜL (ref 1.85–7.62)
NEUTS SEG NFR BLD AUTO: 71 % (ref 43–75)
NRBC BLD AUTO-RTO: 0 /100 WBCS
PLATELET # BLD AUTO: 328 THOUSANDS/UL (ref 149–390)
PMV BLD AUTO: 10 FL (ref 8.9–12.7)
POTASSIUM SERPL-SCNC: 3.6 MMOL/L (ref 3.5–5.3)
RBC # BLD AUTO: 2.86 MILLION/UL (ref 3.88–5.62)
RH BLD: POSITIVE
SODIUM SERPL-SCNC: 132 MMOL/L (ref 135–147)
SPECIMEN EXPIRATION DATE: NORMAL
WBC # BLD AUTO: 10.23 THOUSAND/UL (ref 4.31–10.16)

## 2023-02-25 RX ORDER — POTASSIUM CHLORIDE 20 MEQ/1
20 TABLET, EXTENDED RELEASE ORAL ONCE
Status: COMPLETED | OUTPATIENT
Start: 2023-02-25 | End: 2023-02-25

## 2023-02-25 RX ORDER — TRAZODONE HYDROCHLORIDE 50 MG/1
50 TABLET ORAL
Status: DISCONTINUED | OUTPATIENT
Start: 2023-02-25 | End: 2023-02-26

## 2023-02-25 RX ADMIN — INSULIN LISPRO 1 UNITS: 100 INJECTION, SOLUTION INTRAVENOUS; SUBCUTANEOUS at 16:47

## 2023-02-25 RX ADMIN — INSULIN LISPRO 4 UNITS: 100 INJECTION, SOLUTION INTRAVENOUS; SUBCUTANEOUS at 16:47

## 2023-02-25 RX ADMIN — AMLODIPINE BESYLATE 2.5 MG: 2.5 TABLET ORAL at 08:44

## 2023-02-25 RX ADMIN — SIMETHICONE 80 MG: 80 TABLET, CHEWABLE ORAL at 16:46

## 2023-02-25 RX ADMIN — TRAZODONE HYDROCHLORIDE 50 MG: 50 TABLET ORAL at 21:15

## 2023-02-25 RX ADMIN — MELATONIN 6 MG: at 22:29

## 2023-02-25 RX ADMIN — OXYCODONE HYDROCHLORIDE 5 MG: 5 TABLET ORAL at 05:37

## 2023-02-25 RX ADMIN — OXYCODONE HYDROCHLORIDE 5 MG: 5 TABLET ORAL at 21:16

## 2023-02-25 RX ADMIN — POTASSIUM CHLORIDE 20 MEQ: 1500 TABLET, EXTENDED RELEASE ORAL at 11:09

## 2023-02-25 RX ADMIN — INSULIN LISPRO 2 UNITS: 100 INJECTION, SOLUTION INTRAVENOUS; SUBCUTANEOUS at 11:10

## 2023-02-25 RX ADMIN — DULOXETINE HYDROCHLORIDE 30 MG: 30 CAPSULE, DELAYED RELEASE ORAL at 08:44

## 2023-02-25 RX ADMIN — ACETAMINOPHEN 650 MG: 325 TABLET ORAL at 21:15

## 2023-02-25 RX ADMIN — COLLAGENASE SANTYL 1 APPLICATION.: 250 OINTMENT TOPICAL at 11:11

## 2023-02-25 RX ADMIN — ATORVASTATIN CALCIUM 40 MG: 40 TABLET, FILM COATED ORAL at 08:44

## 2023-02-25 RX ADMIN — ASPIRIN 81 MG CHEWABLE TABLET 81 MG: 81 TABLET CHEWABLE at 08:44

## 2023-02-25 RX ADMIN — INSULIN LISPRO 4 UNITS: 100 INJECTION, SOLUTION INTRAVENOUS; SUBCUTANEOUS at 11:10

## 2023-02-25 RX ADMIN — PANTOPRAZOLE SODIUM 40 MG: 40 TABLET, DELAYED RELEASE ORAL at 16:46

## 2023-02-25 RX ADMIN — SIMETHICONE 80 MG: 80 TABLET, CHEWABLE ORAL at 08:44

## 2023-02-25 RX ADMIN — TAMSULOSIN HYDROCHLORIDE 0.4 MG: 0.4 CAPSULE ORAL at 16:46

## 2023-02-25 RX ADMIN — INSULIN GLARGINE 5 UNITS: 100 INJECTION, SOLUTION SUBCUTANEOUS at 21:13

## 2023-02-25 RX ADMIN — SIMETHICONE 80 MG: 80 TABLET, CHEWABLE ORAL at 11:09

## 2023-02-25 RX ADMIN — SIMETHICONE 80 MG: 80 TABLET, CHEWABLE ORAL at 21:15

## 2023-02-25 RX ADMIN — OXYCODONE HYDROCHLORIDE 5 MG: 5 TABLET ORAL at 11:55

## 2023-02-25 RX ADMIN — PANTOPRAZOLE SODIUM 40 MG: 40 TABLET, DELAYED RELEASE ORAL at 06:02

## 2023-02-25 RX ADMIN — INSULIN LISPRO 1 UNITS: 100 INJECTION, SOLUTION INTRAVENOUS; SUBCUTANEOUS at 21:14

## 2023-02-25 NOTE — ASSESSMENT & PLAN NOTE
WBC 17 28  Mildly above normal, trendiong down, however has a history of spiking and dropping  Monitor labs however need to follow clinical exam and vitals as it may spike and drop again  Monitor off antibiotics    ID following  If Temp >101 blood cultures and consider CT A/P  R/o C diff 3/8, negative  - Ertapenem daily started 3/12

## 2023-02-25 NOTE — ASSESSMENT & PLAN NOTE
Chronically elevated likely in setting of known hepatic metastasis  • RUQ US Decompressed gallbladder around a cholecystostomy tube, limiting evaluation  Hepatomegaly  Heterogeneous echotexture of the liver in this patient with known metastatic disease  7 mm nonobstructing right intrarenal calculus    • Monitor with am CMP  • GI consulted; MRI/MRCP to r/o obstruction, negative for obstruction

## 2023-02-25 NOTE — ASSESSMENT & PLAN NOTE
Continue Cymbalta  Provide supportive counseling and encouragement, easily tearful  Neuropsychology consultation for support

## 2023-02-25 NOTE — TREATMENT PLAN
[facility] 15[facility] 61 y [facility]  [facility] MRN: [id number]  Unit/Bed#: -53 Encounter: [id number]     PATIENT INFORMATION  ADMISSION DATE: 2/24/2023  2:58 PM NATALIE CATEGORY:Debility:  11  Debility (Non-cardiac/Non-pulmonary)   ADMISSION DIAGNOSIS: Debility [R53 [id number]]  EXPECTED LOS: 10 to 14 days     MEDICAL/FUNCTIONAL PROGNOSIS  Based on my assessment of the patient's medical conditions and current functional status, the prognosis for attaining medical and functional goals or the IRF stay is:  Good    Medical Goals: Patient will be medically stable for discharge to [facility] upon completion of rehab program and Patient will be able to manage medical conditions and comorbid conditions with medications and follow up upon completion of rehab program    Cardiopulmonary function/Anemia: Ensure cardiopulmonary stability and optimize cardiopulmonary function not only at rest but with activity as patient's activity level significantly increases in acute rehab compared with prior to transfer in preparation for safe discharge from [facility]  Must closely and frequently monitor blood pressure, HR, anemia to ensure adequate cardiac output during ADLs and ambulation as patient is at increased risk for orthostatic hypotension/syncope and potential injury if not monitored for and managed adequately  Blood pressure management:    Frequent monitoring of blood pressure with appropriate adjustments in blood pressure medication management to optimize blood pressure control and prevent/limit renal complications  Monitoring impact of blood pressure and side-effects of blood pressure medications at rest and with activity    Hypoxia prevention: Ensure appropriate level of oxygenation at rest and with activity to avoid symptomatic hypoxia, maximize functional performance, and decrease risk of atelectasis/pneumonia through close and frequent monitoring, providing appropriate respiratory treatments (such as incentive spirometry), and when necessary provide/adjust respiratory medications  DM: Patient will improve/maintain blood sugar control to ensure optimal healing and decrease risks of complications associated with DM through medication monitoring, adjustments as indicated, and optimal dietary intake and education  Pain management:  Pain will improve with frequent evaluation of pain, careful adjustments in medications, frequent re-evaluation of patient's pain and medical/neurologic status to ensure optimal pain control, avoidance of potential serious and even life-threatening side-effects and drug interactions, as well as weaning pain medications as soon as possible to decrease risk of short and long-term use  Neurologic Disorder: 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, ADLs, and gait:  intensive skilled therapies with physical therapy and occupational therapy with close oversight and management by rehab specialized physician in acute rehabilitation setting to most expeditiously and effectively improve functional mobility, transfers, upper and lower body strengthening, conditioning, balance, and gait training with appropriate assistive device  Patient will have optimal supervision and management of patient's underlying neurologic disorder with specialized rehabilitation physician during this period of recovery to ensure most expeditious and optimal recovery with decreased risks of fall/injury and other complications including acute worsening of neuro disorder, decrease risk of VTE, PNA, and skin ulceration  Inpatient rehabilitation education/teaching:   To be provided to patient and typically family/caregiver (if able to be identified) by all skilled therapists, rehab nursing, case management, and rehab specialized physician to ensure optimal recovery and decrease risks of complications in both acute rehabilitation setting as well as after discharge  Anxiety (and/or Depression): Patient's mood and it's impact on therapy participation and functional recovery will improve during course with supportive counseling, relaxation/breathing techniques and if necessary medication management  Requires frequent re-assessment and close management to ensure anxiety/depression management during acute rehab course with planning for appropriate outpatient management to ensure optimal mental health and functional recovery  Acute kidney failure management and blood pressure management: Frequent measurements and evaluation of urinary intake, urinary output, and labs which include BUN/Cr and electrolytes with appropriate adjustments in medication and when necessary order additional testing  Frequent monitoring of blood pressure with appropriate adjustments in blood pressure medication management to optimize blood pressure control and prevent/limit renal complications  Chronic kidney failure management and blood pressure management: Frequent measurements and evaluation of urinary intake, urinary output, and labs which include BUN/Cr and electrolytes with appropriate adjustments in medication and when necessary order additional testing  Frequent monitoring of blood pressure with appropriate adjustments in blood pressure medication management to optimize blood pressure control and prevent/limit renal complications  Electrolyte abnormality: hyponatremia:  placing patient at risk for additional complications which may impact medical stability and functional recovery  Frequent measurement and monitoring of labs by with appropriate adjustments in medication and/or fluid management by rehabilitation physician and internal medicine consultant      Bladder dysfunction:  Appropriate bladder management with appropriate toileting program from rehab nursing and staff with oversight management by rehabilitation physicians which include appropriate monitoring and possible adjustments in medications to with goals to optimize bladder function and decrease risk of bladder retention, incontinence, and urinary tract infection  Bowel dysfunction: Appropriate Ostomy management from rehab nursing and staff with oversight management by rehabilitation physicians which include adjustments in medications to optimize appropriate bowel function and prevent/decrease risk of constipation and bowel obstruction  Obesity:  Close monitoring of nutrition status, nutrition specialist with adjustments in diet   on appropriate short and long term nutrition and activity  Obesity increases complexity of patient's overall condition and causes unique challenges during this part of patient's recovery process  Supervise and if necessary make adjustments in rehab nursing care and skilled therapy care to ensure appropriate toileting, bed mobility, other ADLs, and ambulation to decrease risk of falls/injuries, VTE, skin breakdown/ulceration and optimize functional recovery  Skin wounds: Appropriate skin checks for wound/skin evaluation including evaluation of healing, worsening of wounds, or signs of infection  Wound care management from rehab nursing, wound care nursing, physicians  Ensure frequent appropriate turning, positioning in bed, in chair, when mobilizing, and when appropriate with use of appropriate devices to optimize healing and decrease risk of worsening or new skin breakdown          ANTICIPATED DISCHARGE DISPOSITION AND SERVICES  COMMUNITY SETTING: Home - independent/modified independent    ANTICIPATED FOLLOW-UP SERVICE:   Outpatient Therapy Services: PT and OT      DISCIPLINE SPECIFIC PLANS:  Required Disciplines & Services: Rehabillitation Nursing, Case Management, Diabetes Education and Psychology    REQUIRED THERAPY:  Therapy Hours per Day Days per Week Total Days   Physical Therapy 1 5 5 10-14   Occupational Therapy 1 5 5 10-14   NOTE: Additional therapy time(s) or changes to allocation of therapies as appropriate to meet patient needs and to achieve functional goals  Patient will participate in above therapy regimen consisting of PT and OT due to the following medical procedure/condition:Debility:  16  Debility (Non-cardiac/Non-pulmonary)    ANTICIPATED FUNCTIONAL OUTCOMES:  ADL:  Anticipate indpendent for Upper body ADLs, supervision to Modified independent for LB ADLs, toileting   Bladder/Bowel: Patient will require supervision with bladder/bowel management with least restrictive device upon completion of rehab program   Transfers:  Modified independent with RW   Locomotion:  Modified indpenendent for 150+ feet with RW and limited breaks between walks     Cognitive:  Supervision to independent     DISCHARGE PLANNING NEEDS  Equipment needs: Discharge needs to be reviewed with team      REHAB ANTICIPATED PARTICIPATION RESTRICTIONS:  Assist with Bathing Shower, Assist with Bathing in Tub, Inability to Drive, [address] with ADLS, Requires Assit with Homemaking and Requires Assit with Steps    Cecilia Tanner,   Physical Medicine and Chris

## 2023-02-25 NOTE — PROGRESS NOTES
PM&R Coverage Progress Note:    Rehabilitation Diagnosis: Debility related metastatic colon adenocarcinoma and recurrent sepsis related to intraabdominal fluid collection near liver, cholecystitis    ASSESSMENT: Stable      PLAN:    Rehabilitation  • Continue current rehabilitation plan of care to maximize function  • No funcitonal barriers identified    Medical issues  • Anemia: H&H Stable  Hemoglobin 7 5  Patient is feeling fatigued in therapies and considering a blood transfusion - may benefit his endurance/participation in therapies  Checking CBC in AM again  • Insomnia: Patient wanting to try something other than Melatonin to get good rest   Trial with Trazodone 50 mg QHS  • Sepsis: Afebrile, WBC trending down  Clinically improved  • Incisional dehiscence: stable, continue Santyl - may need twice a day changes if looking more wet  • Subacute cholecystitis: Perc Alvina tube in place draining stable - recently exchanged on 2/24/23 due to retraction  • Ileostomy: Stable   • Hypotension: +Orthostasis  Compression stocking ordered  Salty small snack prior to therapies  Consider blood transfusion tonight  • Continue current medical plan of care  Appreciate IM consultants co-management  SUBJECTIVE: Patient seen face to face  Feeling a bit dizzy in standing  +Orthostatic  SBP sitting 118 and standing 98  Not sleeping well and wants to try something stronger than Melatonin        ROS:  A ten point review of systems was completed on 02/25/23 and pertinent positives are listed in subjective section  All other systems reviewed were negative  OBJECTIVE:   /60   Pulse 74   Temp 97 8 °F (36 6 °C) (Oral)   Resp 19   SpO2 96%     Physical Exam  Vitals and nursing note reviewed  Constitutional:       General: He is not in acute distress  HENT:      Head: Normocephalic and atraumatic        Nose: Nose normal       Mouth/Throat:      Mouth: Mucous membranes are moist    Eyes: Conjunctiva/sclera: Conjunctivae normal    Cardiovascular:      Rate and Rhythm: Normal rate and regular rhythm  Pulses: Normal pulses  Pulmonary:      Effort: Pulmonary effort is normal       Breath sounds: Normal breath sounds  No wheezing or rales  Abdominal:      General: Bowel sounds are normal  There is no distension  Palpations: Abdomen is soft  Tenderness: There is no abdominal tenderness  Musculoskeletal:         General: No swelling  Cervical back: Neck supple  Skin:     General: Skin is warm  Comments: +ostomy  +Perc Alvina tube  +abd incision    Neurological:      Mental Status: He is alert and oriented to person, place, and time  Motor: Weakness present     Psychiatric:         Mood and Affect: Mood normal             Personally reviewed on 02/25/23:   Lab Results   Component Value Date    WBC 10 23 (H) 02/25/2023    HGB 7 5 (L) 02/25/2023    HCT 23 6 (L) 02/25/2023    MCV 83 02/25/2023     02/25/2023     Lab Results   Component Value Date    SODIUM 132 (L) 02/25/2023    K 3 6 02/25/2023     02/25/2023    CO2 21 02/25/2023    BUN 16 02/25/2023    CREATININE 1 28 02/25/2023    GLUC 114 02/25/2023    CALCIUM 7 8 (L) 02/25/2023     Lab Results   Component Value Date    INR 1 32 (H) 02/07/2023    INR 1 12 12/19/2022    INR 1 10 11/12/2022    PROTIME 16 1 (H) 02/07/2023    PROTIME 14 7 (H) 12/19/2022    PROTIME 14 4 11/12/2022           Current Facility-Administered Medications:   •  acetaminophen (TYLENOL) tablet 650 mg, 650 mg, Oral, Q4H PRN, Andrew Kim, DO, 650 mg at 02/24/23 2334  •  amLODIPine (NORVASC) tablet 2 5 mg, 2 5 mg, Oral, Daily, Andrew Kim, DO, 2 5 mg at 02/25/23 3352  •  aspirin chewable tablet 81 mg, 81 mg, Oral, Daily, Andrew Kim, DO, 81 mg at 02/25/23 6126  •  atorvastatin (LIPITOR) tablet 40 mg, 40 mg, Oral, Daily, Andrew Kim, DO, 40 mg at 02/25/23 3114  •  collagenase (SANTYL) ointment, , Topical, Daily, Andrew Kim, DO  • DULoxetine (CYMBALTA) delayed release capsule 30 mg, 30 mg, Oral, Daily, Rosio Stroud DO, 30 mg at 02/25/23 7313  •  heparin (porcine) subcutaneous injection 5,000 Units, 5,000 Units, Subcutaneous, Q8H [facility], Rosio Stroud DO  •  insulin glargine (LANTUS) subcutaneous injection 5 Units 0 05 mL, 5 Units, Subcutaneous, HS, Rosio Stroud DO, 5 Units at 02/24/23 2121  •  insulin lispro (HumaLOG) 100 units/mL subcutaneous injection 1-6 Units, 1-6 Units, Subcutaneous, TID AC, 1 Units at 02/24/23 1755 **AND** Fingerstick Glucose (POCT), , , 4x Daily AC and at bedtime, Rosio Stroud DO  •  insulin lispro (HumaLOG) 100 units/mL subcutaneous injection 1-6 Units, 1-6 Units, Subcutaneous, HS, Rosio Stroud DO  •  insulin lispro (HumaLOG) 100 units/mL subcutaneous injection 4 Units, 4 Units, Subcutaneous, TID With Meals, Rosio Stroud DO, 4 Units at 02/24/23 1755  •  melatonin tablet 6 mg, 6 mg, Oral, HS PRN, Rosio Stroud DO, 6 mg at 02/24/23 2121  •  methocarbamol (ROBAXIN) tablet 500 mg, 500 mg, Oral, BID PRN, Rosio Stroud DO, 500 mg at 02/24/23 2120  •  ondansetron (ZOFRAN) injection 4 mg, 4 mg, Intravenous, Q4H PRN, Rosio Stroud DO  •  oxyCODONE (ROXICODONE) IR tablet 2 5 mg, 2 5 mg, Oral, Q4H PRN, Rosio Stroud DO  •  oxyCODONE (ROXICODONE) IR tablet 5 mg, 5 mg, Oral, Q4H PRN, Rosio Stroud DO, 5 mg at 02/25/23 5105  •  pantoprazole (PROTONIX) EC tablet 40 mg, 40 mg, Oral, BID AC, Rosio Stroud DO, 40 mg at 02/25/23 0602  •  simethicone (MYLICON) chewable tablet 80 mg, 80 mg, Oral, 4x Daily (with meals and at bedtime), Rosio Stroud DO, 80 mg at 02/25/23 1695  •  tamsulosin (FLOMAX) capsule 0 4 mg, 0 4 mg, Oral, Daily With Kai Crumble, DO, 0 4 mg at 02/24/23 1750    Past Medical History:   Diagnosis Date   • Abdominal pain 03/12/2022   • [facility]     [id number] resolved   • Acute respiratory failure with hypoxia [facility] ) 11/12/2022   • Bacteremia 11/12/2022   • Cancer [facility])    • Diabetes mellitus ([place] [address] )    • Enteritis 08/23/2016   • Flash pulmonary edema ([place] [address] ) 11/12/2022   • Gastroparesis due to DM ([place] [address] ) 08/23/2016   • GERD (gastroesophageal reflux disease)    • Hernia, ventral 08/04/2016   • Hyperlipidemia    • Hypertension    • Morbid obesity ([place] [address] ) 04/17/2018   • Postoperative visit 03/02/2022   • SIRS (systemic inflammatory response syndrome) ([place] [address] ) 03/12/2022   • Snoring    • Stage 3a chronic kidney disease ([place] [address] ) 02/19/2022       Patient Active Problem List    Diagnosis Date Noted   • Malignant neoplasm of transverse colon ([place] [address] ) 03/01/2022   • 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 liver ([place] [address] ) 03/02/2022   • Abscess 12/27/2022   • Sepsis ([place] [address] ) 12/17/2022   • Abdominal wound dehiscence 02/24/2023   • Cholecystitis, unspecified 02/24/2023   • Depression 02/24/2023   • Chronic bilateral pleural effusions 02/24/2023   • Nephrolithiasis 02/09/2023   • Abnormal CT scan 02/07/2023   • Hyponatremia 02/07/2023   • Dehiscence of incision 12/30/2022   • Elevated alkaline phosphatase level 12/30/2022   • Leukocytosis 12/29/2022   • Acute pain 12/27/2022   • Severe protein-calorie malnutrition ([place] [address] ) 12/14/2022   • Acute on chronic kidney failure ([place] [address] ) 12/14/2022   • MR (mitral regurgitation) 11/12/2022   • ESBL (extended spectrum beta-lactamase) producing bacteria infection 11/12/2022   • Encephalopathy 11/09/2022   • Cervical radiculopathy 10/26/2022   • Other fatigue 06/15/2022   • Colostomy prolapse ([place] [address] ) 05/27/2022   • Colon cancer metastasized to liver ([place] [address] ) 03/12/2022   • Iron deficiency anemia, unspecified 03/01/2022   • Thrombocytosis 02/21/2022   • Hypokalemia 02/19/2022   • Transaminitis 02/19/2022   • Type [facility] 02/18/2022   • Anemia 02/18/2022   • Left ureteral calculus 01/30/2020   • Incarcerated umbilical hernia 34/45/3881   • Testicular hypogonadism 06/19/2017   • Low testosterone 05/30/2017   • Type 2 diabetes mellitus without complication, with long-term current use of insulin ([facility] [address] ) 08/23/2016   • Benign essential hypertension 08/23/2016   • Mixed hyperlipidemia 08/23/2016   • Erectile dysfunction 07/11/2016   • Obesity (BMI 30-39 9) 07/11/2016          Jerry Teixeira MD  PM&R      I have spent a total time of 45 minutes on 02/25/23 in caring for this patient including Impressions, Counseling / Coordination of care, Documenting in the medical record, Reviewing / ordering tests, medicine, procedures  , Obtaining or reviewing history   and Communicating with other healthcare professionals   ** Please Note:  voice to text software may have been used in the creation of this document   Although proof errors in transcription or interpretation are a potential of such software**

## 2023-02-25 NOTE — ASSESSMENT & PLAN NOTE
Lab Results   Component Value Date    HGBA1C 8 0 (H) 09/26/2022       Recent Labs     02/24/23  0839 02/24/23  1137 02/24/23  1626 02/24/23 2040   POCGLU 106 105 152* 120     • Home:  Lantus 8U qam/Lispro 4U TID  • Here:  Lantus 5U qhs/Lispro 5U TID, SSI     • Has a DEXCOM meter and a regular meter at home  • Continue DM diet and QID Accuchecks/SSI

## 2023-02-25 NOTE — H&P
PHYSICAL MEDICINE AND REHABILITATION H&P/ADMISSION NOTE  Jaguar Mackenzie 61 y o  male MRN: [id number]  Unit/Bed#: [facility] 004-41 Encounter: [id number]     Rehab Diagnosis: Impairment of mobility, safety and Activities of Daily Living (ADLs) due to Debility:  16  Debility (Non-cardiac/Non-pulmonary)    Etiologic: Debility secondary to sepsis  Date of Onset: 2/7/23     Date of surgery: n/a    History of Present Illness:   Jaguar Mackenzie is a 61 y o  male with a medical history of hypertension, hyperlipidemia, ventral hernia, GERD that presented to [address] [place] [address] on 11/7 for an elective surgical procedure due to metastatic colon adenocarcinoma by Dr Jay Aiken  Patient presented to hem/onc on 2/22 when CT of abdomen revealed transverse colonic apple core lesion and innumerable hepatic metastases  MRI revealed multiple hepatic metastasis with smaller lesions in left lobe with larger lesions on the right lobe  On 11/7, patient underwent exploratory laparotomy, segment 3 liver resection, ablation, intraoperative ultrasound of liver  This was complicated by a left upper quadrant hematoma, imaging showed suspected leak from transverse colon anastomosis  On 11/16 went to OR revealed left upper quadrant infected hematoma, defect in the transverse colon anastomosis with extravasation of succus  Massively dilated cecum requiring resection  He had a right hemicolectomy, left in discontinuity and temporary abdominal closure device was placed  On 11/17, he underwent re-exploration, partial descending colectomy, primary colonic anastomosis created with diverting loop ileostomy and midline wound VAC placement  He completed a prolonged course of IV antibiotics for ESBL bacteremia due to intra-abdominal abscess  On 12/3, patient was discharged to SNF for rehab  Patient was transferred back to hospital on 12/4 due to abdominal pain   He is unable to return to SNF due to concerns for pain management and management of acute medical needs  Per Surgery, patient is to continue with wet-to-dry dressings to midline abdominal incision, and packing to old stoma site  Patient has been observed off of antibiotics, with close monitoring of leukocytosis  On 12/6, patient was with noted increased creatinine level, and was administered 1L IV fluid bolus, with noted improvement  He then developed tachycardia, as well as increased abdominal pain, requiring additional IV fluid bolus  Abdominal incision was with noted yellow drainage  CT chest/abdomen/pelvis showed abdominal abscess and distended gallbladder  ID was consulted, and patient was continued on IV antibiotics (plan for 7 day course through 12/15)  Patient was taken to IR and underwent percutaneous cholecystostomy tube insertion and hepatectomy abscess drainage  Nephrology was consulted re: ALEXY, and initiated patient on Sodium Bicarb gtt as well as IV Albumin to assist with intravascular volume shifts, which as since been discontinued  On 12/14, patient with complaints of midline/left chestwall pain, right below IR abscess drain  Lasted for a few seconds and then resolved - suspected related to drain placement, however cardiac workup completed  Trop level was 57 and CXR showed progressive bilateral opacities suspicious for CHF and small left pleural effusion  Cardiology was consulted, with chest pain appearing musculoskeletal, and no further cardiac workup is warranted  Additionally, no diuresis or further intervention suggested for CXR findings  The patient was evaluated by the Rehabilitation team and deemed an appropriate candidate for comprehensive inpatient rehabilitation and admitted to the [facility] on 12/14/2022  He was discharged home on 1/17/23  He presented to the [address] [place] [address] on 2/7/23 with increasing weakness for the prior 2 weeks in the setting of 2 falls   He was found to be Spetic on admission with the m ain potential source being his midline abdominal wound with fould-smelling drainage  CT showed improvement in the fluid collection at the left liver segment, perisplenic, left retroperitoneum, inferior to the spleen  Patient treated with broad spectrum antibiotics eventually placed on Ertapenem  Course conplicated by spiking intermittent fevers as well as elevated WBC count  Repeat imaging concerning for cholecystitis  This was discussed with both surgery and IR, IR plan for cystostomy tube change  Pattock drain was removed on 2/24, cystostomy drain exchanged on 2/24  The patient was evaluated by the Rehabilitation team and deemed an appropriate candidate for comprehensive inpatient rehabilitation and admitted to the [facility] on 2/24/2023  2:58 PM    On arrival he endorses some discomfort around the perc chol site as it was just exchanged, otherwise he endorses improved appetite and limited nausea since his prior admission, less leaking of the ostomy and overall improved function, but still not at his baseline  Hgb remains low, type and screen and CBC ordered for the morning  Plan:     Rehabilitation  • Functional deficits: impaired mobility, self care  • Begin PT/OT/SLP  Rehabilitation goals are to achieve a Modified Independent level with mobility and self care  Prognosis is good  ELOS is 10-14 days  Estimated discharge is home       DVT prophylaxis  • heparin    Pain  • Acetaminophen 650mg Q4H PRN  • Robaxin 500mg BID PRN  • Oxycodone 2 5-5mg Q4H PRN    Bladder plan  • Continent    Bowel plan  • Ostomy with stool and gas    Code Status  • Level 1: Full Code      * Sepsis ([facility] )  Assessment & Plan  Noted the development of foul-smelling drainage and increasing pain from his chronic abdominal wound with leukocytosis, tachypnea, tachycardia  • Purulent drainage from abdominal wound  • Hx of colon CA w/ extensive abd surgical- adenocarcinoma of transverse colon section  • Patient has several abdominal drains in place, with CT showing improvement in the fluid collections at the left liver lobe, perisplenic area, and the left retroperitoneum inferior to the spleen  • Completed Vancomycin and Ertapenem  • S/p bedside midline wound debridement + wet dressing 2/8 with general surgery  • S/p IR tube check 2/17/2023, drains kept in place  Discussed with IR, follow up in 1 month  • Given ongoing low grade fever and worsening leukocytosis, ordered repeat blood cultures and repeat CT a/p    CT a/p showing moderate R pleural effusion, imaging concerning for cholecystitis  Discussed case with surgery, IR  IR will reposition drain today- Radha tube check showed retraction of the radha tube   Interval occlusion of the cystic duct   Dino-enteric fistula persisted  Exchange of radha tube 2/24  • Continue to monitor off of antibiotics, low threshold to re-consult ID, but stable at this time  • Debility from Sepsis in addition to 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  • Pain control, anxiety management  • PT/OT    Malignant neoplasm of transverse colon [facility])  Assessment & Plan  Complex history with chronological details below:    2/22: S/P diverting colostomy, liver biopsy +Chemo tx  11/7/22: Hepatic resection and colostomy reversal  11/16/22: Exploratory laparotomy with bowel resection and VAC placement   11/17/22: Right hemicolectomy, partial descending colon resection, colocolonic anastomosis with loop ileosotmy and midline VAC placement  12/20/22: Drains placed, total of 3 drains + perc radha tube + ileostomy  1/31/23: 2 of 3 drains removed  This hospital stay had fever/leukocytosis/sepsis, started Meropenem and then to Vancomycin and Ertapenem  Wound cx again = Ecoli ESBL  LD Ertapenem 2/14, Vancomycin 2/16 2/24: Abscessogram of midline drain showed minimal persistent collection and the midline catheter was removed   Perc Radha tube exchanged  • Patient does not wish to follow with [place] oncology anymore as it is too far of a drive; would like to establish with [facility] oncology (already follows with Dr Arlette Hernandez)  • Follows with Dr Underwood November as outpt    Chronic bilateral pleural effusions  Assessment & Plan  Hx of pleural effusions with   Interval worsening of moderate right and small left pleural effusions  There is associated bibasilar atelectasis based on CTAP on 2/22  Monitor oxygen and breathing, may benefit from thoracentesis    Depression  Assessment & Plan  Continue Cymbalta  Provide supportive counseling and encouragement, easily tearful  Neuropsychology consultation for support    Cholecystitis, unspecified  Assessment & Plan  Subacute at this point, but still requiring per radha tube  • s/p percutaneous tube placement 12/8/22 asnd recent exchange on 2/24/23  • CT scan was concerning for cholecystitis --> to IR 2/24 = showed retraction of the radha tube and interval occlusion of the cystic duct   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  Radha tube was exchanged  • Monitor output and pain    Abdominal wound dehiscence  Assessment & Plan  Midline Abdominal wound with initially fould smelling drainage  Now improved per patient with red granular base and slough noted  Snatyl and wet to dry dressing was done in acute care  Consult wound care for eval and recs    Nephrolithiasis  Assessment & Plan  RUQ US revealed 7 mm nonobstructing right intrarenal calculus  No hydronephrosis noted on CT a/p  Asymptomatic at this time  • Flomax  • Monitor outpt, symptoms    Hyponatremia  Assessment & Plan  • Mild, most recently 132 on 2/24  • [place] likely 2/2 SIADH in setting of malignancy    Elevated alkaline phosphatase level  Assessment & Plan  Chronically elevated likely in setting of known hepatic metastasis  • RUQ US Decompressed gallbladder around a cholecystostomy tube, limiting evaluation  Hepatomegaly  Heterogeneous echotexture of the liver in this patient with known metastatic disease  7 mm nonobstructing right intrarenal calculus    • Monitor with am CMP    Leukocytosis  Assessment & Plan  Mildly above normal, trendiong down, however has a history of spiking and dropping  Monitor labs however need to follow clinical exam and vitals as it may spike and drop again  Monitor off antibiotics  Abscess  Assessment & Plan  Smaller abscesses on recent studies now with no active drains  At risk for further complications  Lower theshold for abdominal CT if develops fevers or leukocytosis    Acute on chronic kidney failure [facility])  Assessment & Plan  Lab Results   Component Value Date    EGFR 56 02/24/2023    EGFR 52 02/23/2023    EGFR 52 02/22/2023    CREATININE 1 36 (H) 02/24/2023    CREATININE 1 45 (H) 02/23/2023    CREATININE 1 45 (H) 02/22/2023     • POA with creatinine 2 52; baseline 1-1 3  • Suspecting possible multifactorial cause in setting of dehydration and sepsis  • Improving back into baseline levels, Continue to monitor on labs, minimize any relative hypotension  Avoid nephrotoxic agents  Anemia  Assessment & Plan  Required transfusion on 2/16  Hgb most recently 7 2 (8 1)  Order type and screen, follow up CBC on 2/25,may need additional transfusion    Mixed hyperlipidemia  Assessment & Plan  Continue statin    Benign essential hypertension  Assessment & Plan  Continue Amlodipine 2 5mg daily  Monitor pressures in therapy    Type 2 diabetes mellitus without complication, with long-term current use of insulin [facility])  Assessment & Plan  Lab Results   Component Value Date    HGBA1C 8 0 (H) 09/26/2022       Recent Labs     02/24/23  0839 02/24/23  1137 02/24/23  1626 02/24/23  2040   POCGLU 106 105 152* 120     • Home:  Lantus 8U qam/Lispro 4U TID  • Here:  Lantus 5U qhs/Lispro 4U TID  • Has a DEXCOM meter and a regular meter at home  • Continue DM diet and QID Accuchecks/SSI            Subjective/Interval Events:   Review of Systems   Constitutional: Negative for chills and fever  HENT: Negative for hearing loss and trouble swallowing      Eyes: Negative for photophobia and visual disturbance  Respiratory: Negative for cough and shortness of breath  Cardiovascular: Negative for chest pain and leg swelling  Gastrointestinal: Positive for abdominal pain  Negative for constipation, diarrhea, nausea and vomiting  Pain at perc radha site   Endocrine: Negative for cold intolerance and heat intolerance  Genitourinary: Negative for dysuria and hematuria  Musculoskeletal: Positive for gait problem  Negative for back pain and neck pain  Skin: Negative for rash and wound  Allergic/Immunologic: Positive for food allergies  Negative for environmental allergies  Neurological: Positive for weakness  Negative for light-headedness and headaches  Hematological: Negative for adenopathy  Does not bruise/bleed easily  Psychiatric/Behavioral: Positive for sleep disturbance  Negative for dysphoric mood  Function:  Prior level of function and living situation:  PRIOR LEVEL OF FUNCTION:  He [place] [facility]  Alexander Hinson is  and lives with their family  Self Care: Needed some help, Indoor Mobility: ambulated with a RW following discharge from rehab on 1/17/23 and Cognition: Independent     HOME ENVIRONMENT:  The living area: Patient lives in a multi-level home  He is able to live on the main level where there is a 1/2 bath and bedroom  He was sponge bathing prior to be admitted to the hospital and was not using the bathroom  Patient has an ileostomy  There are 3 steps to enter the home  The patient will not have 24 hour supervision/physical assistance available upon discharge      Current level of function:  Physical Therapy:Ambulation 36' with RW Min A, Min A for transfers with RW  Occupational Therapy: UB ADLs Min A, LB ADLs Min A, supervision toileting    Physical Exam:  /82 (BP Location: Left arm)   Pulse 86   Temp 98 1 °F (36 7 °C) (Oral)   Resp 20   SpO2 95%        Intake/Output Summary (Last 24 hours) at 2/24/2023 2319  Last data filed at 2/24/2023 2046  Gross per 24 hour   Intake 20 ml   Output 750 ml   Net -730 ml       There is no height or weight on file to calculate BMI  Physical Exam  Vitals reviewed  Constitutional:       General: He is not in acute distress  Appearance: He is ill-appearing  HENT:      Head: Normocephalic and atraumatic  Right Ear: External ear normal       Left Ear: External ear normal       Nose: Nose normal  No rhinorrhea  Mouth/Throat:      Mouth: Mucous membranes are moist       Pharynx: Oropharynx is clear  Eyes:      General: No scleral icterus  Cardiovascular:      Rate and Rhythm: Normal rate and regular rhythm  Pulses: Normal pulses  Pulmonary:      Effort: Pulmonary effort is normal  No respiratory distress  Breath sounds: No wheezing  Abdominal:      General: There is no distension  Palpations: Abdomen is soft  Comments: Right sided mid gastric perc radha tube draining  Slight tenderness surrounding without signs of developing cellulitis/infection  Midline abdominal wound with granulated base and slough layer sticking to the wet-to-dry dressing in place  Left mid to hypogastric ostomy with stool and gas in bag   Musculoskeletal:      Cervical back: Normal range of motion  Right lower leg: No edema  Left lower leg: No edema  Skin:     General: Skin is warm and dry  Comments: Right toe wound from hypertrophic nails  Right chest port   Neurological:      Mental Status: He is alert and oriented to person, place, and time  Sensory: No sensory deficit  Motor: Weakness present  Comments: Weakness generalized throughout, however still with 4-/5 bilateral hip flexor strength   Psychiatric:         Mood and Affect: Mood normal          Behavior: Behavior normal             Labs, medications, and imaging personally reviewed      Laboratory:    Lab Results   Component Value Date    SODIUM 132 (L) 02/24/2023    K 3 7 02/24/2023     02/24/2023    CO2 20 (L) 02/24/2023    BUN 14 02/24/2023    CREATININE 1 36 (H) 02/24/2023    GLUC 103 02/24/2023    CALCIUM 7 6 (L) 02/24/2023     Lab Results   Component Value Date    WBC 10 42 (H) 02/24/2023    HGB 7 2 (L) 02/24/2023    HCT 23 0 (L) 02/24/2023    MCV 81 (L) 02/24/2023     02/24/2023     Lab Results   Component Value Date    INR 1 32 (H) 02/07/2023    INR 1 12 12/19/2022    INR 1 10 11/12/2022    PROTIME 16 1 (H) 02/07/2023    PROTIME 14 7 (H) 12/19/2022    PROTIME 14 4 11/12/2022         Current Facility-Administered Medications:   •  acetaminophen (TYLENOL) tablet 650 mg, 650 mg, Oral, Q4H PRN, Vee Solorio DO  •  [START ON 2/25/2023] amLODIPine (NORVASC) tablet 2 5 mg, 2 5 mg, Oral, Daily, Vee Solorio DO  •  [START ON 2/25/2023] aspirin chewable tablet 81 mg, 81 mg, Oral, Daily, Vee Solorio DO  •  [START ON 2/25/2023] atorvastatin (LIPITOR) tablet 40 mg, 40 mg, Oral, Daily, Vee Solorio DO  •  [START ON 2/25/2023] collagenase (SANTYL) ointment, , Topical, Daily, Vee Solorio DO  •  [START ON 2/25/2023] DULoxetine (CYMBALTA) delayed release capsule 30 mg, 30 mg, Oral, Daily, Vee Solorio DO  •  heparin (porcine) subcutaneous injection 5,000 Units, 5,000 Units, Subcutaneous, Q8H [address], Vee Solorio DO  •  insulin glargine (LANTUS) subcutaneous injection 5 Units 0 05 mL, 5 Units, Subcutaneous, HS, Vee Solorio DO, 5 Units at 02/24/23 2121  •  insulin lispro (HumaLOG) 100 units/mL subcutaneous injection 1-6 Units, 1-6 Units, Subcutaneous, TID AC, 1 Units at 02/24/23 1755 **AND** Fingerstick Glucose (POCT), , , 4x Daily AC and at bedtime, Vee Solorio DO  •  insulin lispro (HumaLOG) 100 units/mL subcutaneous injection 1-6 Units, 1-6 Units, Subcutaneous, HS, Vee Solorio DO  •  insulin lispro (HumaLOG) 100 units/mL subcutaneous injection 4 Units, 4 Units, Subcutaneous, TID With Meals, Vee Solorio DO, 4 Units at 02/24/23 1755  • melatonin tablet 6 mg, 6 mg, Oral, HS PRN, Anusha Rideau, DO, 6 mg at 02/24/23 2121  •  methocarbamol (ROBAXIN) tablet 500 mg, 500 mg, Oral, BID PRN, Anusha Rideau, DO, 500 mg at 02/24/23 2120  •  ondansetron (ZOFRAN) injection 4 mg, 4 mg, Intravenous, Q4H PRN, Anusha Rideau, DO  •  oxyCODONE (ROXICODONE) IR tablet 2 5 mg, 2 5 mg, Oral, Q4H PRN, Anusha Rideau, DO  •  oxyCODONE (ROXICODONE) IR tablet 5 mg, 5 mg, Oral, Q4H PRN, Anusha Rideau, DO, 5 mg at 02/24/23 2120  •  [START ON 2/25/2023] pantoprazole (PROTONIX) EC tablet 40 mg, 40 mg, Oral, BID AC, Anusha Rideau, DO  •  simethicone (MYLICON) chewable tablet 80 mg, 80 mg, Oral, 4x Daily (with meals and at bedtime), Anusha Rideau, DO, 80 mg at 02/24/23 2121  •  tamsulosin (FLOMAX) capsule 0 4 mg, 0 4 mg, Oral, Daily With Dinner, Anusha Rideau, DO, 0 4 mg at 02/24/23 1750  Allergies   Allergen Reactions   • Shellfish-Derived Products - Food Allergy Anaphylaxis   • Erythromycin GI Intolerance      Patient Active Problem List    Diagnosis Date Noted   • Sepsis ([facility] ) 12/17/2022   • Malignant neoplasm of transverse colon ([facility] ) 03/01/2022   • Abdominal wound dehiscence 02/24/2023   • Cholecystitis, unspecified 02/24/2023   • Depression 02/24/2023   • Chronic bilateral pleural effusions 02/24/2023   • Nephrolithiasis 02/09/2023   • Abnormal CT scan 02/07/2023   • Hyponatremia 02/07/2023   • Dehiscence of incision 12/30/2022   • Elevated alkaline phosphatase level 12/30/2022   • Leukocytosis 12/29/2022   • Abscess 12/27/2022   • Acute pain 12/27/2022   • Severe protein-calorie malnutrition ([facility] ) 12/14/2022   • Acute on chronic kidney failure ([place] [address] ) 12/14/2022   • MR (mitral regurgitation) 11/12/2022   • ESBL (extended spectrum beta-lactamase) producing bacteria infection 11/12/2022   • Encephalopathy 11/09/2022   • Cervical radiculopathy 10/26/2022   • Other fatigue 06/15/2022   • Colostomy prolapse ([place] [address] ) 05/27/2022   • Colon cancer metastasized to liver ([place] [address] ) 03/12/2022 • 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) 03/02/2022   • Iron deficiency anemia, unspecified 03/01/2022   • Thrombocytosis 02/21/2022   • Hypokalemia 02/19/2022   • Transaminitis 02/19/2022   • Type 2 diabetes mellitus with hyperlipidemia ([facility] ) 02/18/2022   • Anemia 02/18/2022   • Left ureteral calculus 01/30/2020   • Incarcerated umbilical hernia 52/11/8533   • Testicular hypogonadism 06/19/2017   • Low testosterone 05/30/2017   • Type 2 diabetes mellitus without complication, with long-term current use of insulin ([facility] ) 08/23/2016   • Benign essential hypertension 08/23/2016   • Mixed hyperlipidemia 08/23/2016   • Erectile dysfunction 07/11/2016   • Obesity (BMI 30-39 9) 07/11/2016     Past Medical History:   Diagnosis Date   • Abdominal pain 03/12/2022   • Acute renal failure ([facility] )     [id number] resolved   • Acute respiratory failure with hypoxia ([facility] ) 11/12/2022   • Bacteremia 11/12/2022   • Cancer ([facility] )    • Diabetes mellitus ([facility] )    • Enteritis 08/23/2016   • Flash pulmonary edema ([address] ) 11/12/2022   • Gastroparesis due to DM ([facility] ) 08/23/2016   • GERD (gastroesophageal reflux disease)    • Hernia, ventral 08/04/2016   • Hyperlipidemia    • Hypertension    • Morbid obesity ([facility] ) 04/17/2018   • Postoperative visit 03/02/2022   • SIRS (systemic inflammatory response syndrome) ([facility] ) 03/12/2022   • Snoring    • Stage 3a chronic kidney disease ([facility] ) 02/19/2022     Past Surgical History:   Procedure Laterality Date   • COLONOSCOPY     • COLOSTOMY N/A 02/20/2022    Procedure: COLOSTOMY LOOP, diverting;  Surgeon: Essie Rosado MD;  Location: [facility];  Service: General   • ESOPHAGOGASTRODUODENOSCOPY N/A 08/24/2016    Procedure: ESOPHAGOGASTRODUODENOSCOPY (EGD); Surgeon: Billy Arriaga MD;  Location: [facility];   Service:    • EXPLORATORY LAPAROTOMY W/ BOWEL RESECTION N/A 11/16/2022    Procedure: LAPAROTOMY EXPLORATORY W/ BOWEL RESECTION- VAC PLACEMENT;  Surgeon: Dave Isbell MD;  Location: [facility] OR;  Service: Surgical Oncology   • EXPLORATORY LAPAROTOMY W/ BOWEL RESECTION N/A 11/17/2022    Procedure: LAPAROTOMY EXPLORATORY W/ BOWEL RESECTION;  Surgeon: Latosha Holm MD;  Location: [facility];  Service: Surgical Oncology   • ILEOSTOMY N/A 11/17/2022    Procedure: ILEOSTOMY;  Surgeon: Latosha Holm MD;  [facility]: [facility];  Service: Surgical Oncology   • ILEOSTOMY CLOSURE N/A 11/7/2022    Procedure: REVERSAL COLOSTOMY;  Surgeon: Jeremy Fernando MD;  [facility]: [facility];  Service: Surgical Oncology   • IR CHOLECYSTOSTOMY TUBE CHECK AND/OR REMOVAL  2/24/2023   • IR CHOLECYSTOSTOMY TUBE CHECK/CHANGE/REPOSITION/REINSERTION/UPSIZE  12/12/2022   • IR CHOLECYSTOSTOMY TUBE PLACEMENT  12/8/2022   • IR DRAINAGE TUBE CHECK AND/OR REMOVAL  1/3/2023   • IR DRAINAGE TUBE CHECK/CHANGE/REPOSITION/REINSERTION/UPSIZE  1/12/2023   • IR DRAINAGE TUBE CHECK/CHANGE/REPOSITION/REINSERTION/UPSIZE  1/31/2023   • IR DRAINAGE TUBE CHECK/CHANGE/REPOSITION/REINSERTION/UPSIZE  2/10/2023   • IR DRAINAGE TUBE CHECK/CHANGE/REPOSITION/REINSERTION/UPSIZE  2/16/2023   • IR DRAINAGE TUBE CHECK/CHANGE/REPOSITION/REINSERTION/UPSIZE  2/24/2023   • IR DRAINAGE TUBE PLACEMENT  12/8/2022   • IR DRAINAGE TUBE PLACEMENT  12/20/2022   • IR PORT CHECK  2/24/2023   • IR PORT PLACEMENT  03/10/2022   • IR THORACENTESIS  1/12/2023   • KIDNEY STONE SURGERY     • LIVER BIOPSY LAPAROSCOPIC N/A 02/20/2022    Procedure: DIAGNOSTIC LAPAROSCOPIC LIVER BIOPSY, DIVERTING LOOP COLOSTOMY ;  Surgeon: Carlos Stephen MD;  Location: [facility];  Service: General   • LIVER LOBECTOMY N/A 11/7/2022    Procedure: SEGMENT 3 LIVER RESECTION, ABLATION, INTRAOPERATIVE U/S OF LIVER, PERITONEAL BIOPSY;  Surgeon: Jeremy Fernando MD;  Location: [facility];  Service: Surgical Oncology   • RIGHT COLON RESECTION N/A 11/7/2022    Procedure: EX LAP, TRANVERSE COLON RESECTION;  Surgeon: Jeremy Fernando MD;  Location: [facility];  Service: Surgical Oncology   • TONSILLECTOMY     • UMBILICAL HERNIA REPAIR LAPAROSCOPIC N/A 04/26/2019    Procedure: LAPAROSCOPIC UMBILICAL HERNIA REPAIR;  Surgeon: Buck Boyd MD;  Location: [facility];  Service: General   • VAC DRESSING APPLICATION N/A 06/09/6578    Procedure: APPLICATION VAC DRESSING ABDOMEN/TRUNK;  Surgeon: Yusra Mcgrath MD;  Location:  MAIN OR;  Service: Surgical Oncology     Social History     Socioeconomic History   • Marital status: /Civil Union     Spouse name: Not on file   • Number of children: Not on file   • Years of education: Not on file   • Highest education level: Not on file   Occupational History   • Occupation: ACTOR     Employer: [employer]   Tobacco Use   • Smoking status: Never   • Smokeless tobacco: Never   Vaping Use   • Vaping Use: Never used   Substance and Sexual Activity   • Alcohol use: Never   • Drug use: No   • Sexual activity: Yes     Partners: Female   Other Topics Concern   • Not on file   Social History Narrative   • Not on file     Social Determinants of Health     Financial Resource Strain: Not on file   Food Insecurity: No Food Insecurity   • Worried About Running Out of Food in the Last Year: Never true   • Ran Out of Food in the Last Year: Never true   Transportation Needs: No Transportation Needs   • Lack of Transportation (Medical): No   • Lack of Transportation (Non-Medical):  No   Physical Activity: Insufficiently Active   • Days of Exercise per Week: 5 days   • Minutes of Exercise per Session: 10 min   Stress: No Stress Concern Present   • Feeling of Stress : Not at all   Social Connections: Not on file   Intimate Partner Violence: Not on file   Housing Stability: Low Risk    • Unable to Pay for Housing in the Last Year: No   • Number of Places Lived in the Last Year: 1   • Unstable Housing in the Last Year: No     Social History     Tobacco Use   Smoking Status Never   Smokeless Tobacco Never     Social History     Substance and Sexual Activity   Alcohol Use Never     Family History   Problem Relation Age of Onset   • Diabetes Mother         mellitus   • Lung cancer Mother    • Coronary artery disease Father          Medical Necessity Criteria for [facility] Admission: Electrolyte imbalance:  hyponatremia, Acute Kidney Injury, Chronic Kidney Disease, Anemia, with the following plan: monitor H/H, consider blood transfusion and status post transfusion, Hypertension, Bowel/Bladder Management, Diabetes requiring close blood glucose monitoring, Incision/Wound care, Leukocystosis and Positive wound culture  In addition, the preadmission screen, post-admission physical evaluation, overall plan of care and admissions order demonstrate a reasonable expectation that the following criteria were met at the time of admission to the [facility]  1  The patient requires active and ongoing therapeutic intervention of multiple therapy disciplines (physical therapy, occupational therapy, speech-language pathology, or prosthetics/orthotics), one of which is physical or occupational therapy  2  Patient requires an intensive rehabilitation therapy program, as defined in Chapter 1, section 110 2 2 of the CMS Medicare Policy Manual  This intensive rehabilitation therapy program will consist of at least 3 hours of therapy per day at least 5 days per week or at least 15 hours of intensive rehabilitation therapy within a 7 consecutive day period, beginning with the date of admission to the [facility]  3  The patient is reasonably expected to actively participate in, and benefit significantly from, the intensive rehabilitation therapy program as defined in Chapter 1, section 110 2 2 of the CMS Medicare Policy Manual at this time of admission to the [facility]   He can reasonably be expected to make measurable improvement (that will be of practical value to improve the patient’s functional capacity or adaptation to impairments) as a result of the rehabilitation treatment, as defined in section 110 3, and such improvement can be expected to be made within the prescribed period of time  As noted in the CMS Medicare Policy Manual, the patient need not be expected to achieve complete independence in the domain of self-care nor be expected to return to his or her prior level of functioning in order to meet this standard  4  The patient must require physician supervision by a rehabilitation physician  As such, a rehabilitation physician will conduct face-to-face visits with the patient at least 3 days per week throughout the patient’s stay in the [facility] to assess the patient both medically and functionally, as well as to modify the course of treatment as needed to maximize the patient’s capacity to benefit from the rehabilitation process  5  The patient requires an intensive and coordinated interdisciplinary approach to providing rehabilitation, as defined in Chapter 1, section 110 2 5 of the CMS Medicare Policy Manual  This will be achieved through periodic team conferences, conducted at least once in a 7-day period, and comprising of an interdisciplinary team of medical professionals consisting of: a rehabilitation physician, registered nurse,  and/or , and a licensed/certified therapist from each therapy discipline involved in treating the patient  Changes Since Pre-admission Assessment: None -This patient's participation in rehab continues to be reasonable, necessary and appropriate  CMS Required Post-Admission Physician Evaluation Elements  History and Physical, including medical history, functional history and active comorbidities as in above text  Post-Admission Physician Evaluation:  The patient has the potential to make improvement and is in need of physical, occupational, and/or therapy services  The patient may also need nutritional services   Given the patient's complex medical condition and risk of further medical complications, rehabilitative services cannot be safely provided at a lower level of care, such as a skilled nursing facility  I have reviewed the patient's functional and medical status at the time of the preadmission screening and they are the same as on the day of this admission  I acknowledge that I have personally performed a full physical examination on this patient within 24 hours of admission  The patient and/or family demonstrated understanding the rehabilitation program and the discharge process after we discussed them  Agree in entirety: yes  Minor adaptions: none    Major changes: none    Peter Maldonado,   Physical Medicine and Chris Luu    I have spent a total time of 90 minutes on 02/24/23 in caring for this patient including Diagnostic results, Risks and benefits of tx options, Patient and family education, Impressions, Counseling / Coordination of care, Documenting in the medical record, Reviewing / ordering tests, medicine, procedures  , Obtaining or reviewing history   and Communicating with other healthcare professionals   ** Please Note: Fluency Direct voice to text software may have been used in the creation of this document   **

## 2023-02-25 NOTE — PLAN OF CARE
Problem: PAIN - ADULT  Goal: Verbalizes/displays adequate comfort level or baseline comfort level  Description: Interventions:  - Encourage patient to monitor pain and request assistance  - Assess pain using appropriate pain scale  - Administer analgesics based on type and severity of pain and evaluate response  - Implement non-pharmacological measures as appropriate and evaluate response  - Consider cultural and social influences on pain and pain management  - Notify physician/advanced practitioner if interventions unsuccessful or patient reports new pain  Outcome: Progressing     Problem: INFECTION - ADULT  Goal: Absence or prevention of progression during hospitalization  Description: INTERVENTIONS:  - Assess and monitor for signs and symptoms of infection  - Monitor lab/diagnostic results  - Monitor all insertion sites, i e  indwelling lines, tubes, and drains  - Monitor endotracheal if appropriate and nasal secretions for changes in amount and color  - [place] appropriate cooling/warming therapies per order  - Administer medications as ordered  - Instruct and encourage patient and family to use good hand hygiene technique  - Identify and instruct in appropriate isolation precautions for identified infection/condition  Outcome: Progressing  Goal: Absence of fever/infection during neutropenic period  Description: INTERVENTIONS:  - Monitor WBC    Outcome: Progressing     Problem: SAFETY ADULT  Goal: Patient will remain free of falls  Description: INTERVENTIONS:  - Educate patient/family on patient safety including physical limitations  - Instruct patient to call for assistance with activity   - Consult OT/PT to assist with strengthening/mobility   - Keep Call bell within reach  - Keep bed low and locked with side rails adjusted as appropriate  - Keep care items and personal belongings within reach  - Initiate and maintain comfort rounds  - Make Fall Risk Sign visible to staff  - Offer Toileting every  Hours, in advance of need  - Initiate/Maintain alarm  - Obtain necessary fall risk management equipment:   - Apply yellow socks and bracelet for high fall risk patients  - Consider moving patient to room near nurses station  Outcome: Progressing  Goal: Maintain or return to baseline ADL function  Description: INTERVENTIONS:  -  Assess patient's ability to carry out ADLs; assess patient's baseline for ADL function and identify physical deficits which impact ability to perform ADLs (bathing, care of mouth/teeth, toileting, grooming, dressing, etc )  - Assess/evaluate cause of self-care deficits   - Assess range of motion  - Assess patient's mobility; develop plan if impaired  - Assess patient's need for assistive devices and provide as appropriate  - Encourage maximum independence but intervene and supervise when necessary  - Involve family in performance of ADLs  - Assess for home care needs following discharge   - Consider OT consult to assist with ADL evaluation and planning for discharge  - Provide patient education as appropriate  Outcome: Progressing  Goal: Maintains/Returns to pre admission functional level  Description: INTERVENTIONS:  - Perform BMAT or MOVE assessment daily    - Set and communicate daily mobility goal to care team and patient/family/caregiver  - Collaborate with rehabilitation services on mobility goals if consulted  - Perform Range of Motion times a day  - Reposition patient every  hours    - Dangle patient  times a day  - Stand patient  times a day  - Ambulate patient  times a day  - Out of bed to chair  times a day   - Out of bed for meals  times a day  - Out of bed for toileting  - Record patient progress and toleration of activity level   Outcome: Progressing     Problem: DISCHARGE PLANNING  Goal: Discharge to home or other facility with appropriate resources  Description: INTERVENTIONS:  - Identify barriers to discharge w/patient and caregiver  - Arrange for needed discharge resources and transportation as appropriate  - Identify discharge learning needs (meds, wound care, etc )  - Arrange for interpretive services to assist at discharge as needed  - Refer to Case Management Department for coordinating discharge planning if the patient needs post-hospital services based on physician/advanced practitioner order or complex needs related to functional status, cognitive ability, or social support system  Outcome: Progressing     Problem: Prexisting or High Potential for Compromised Skin Integrity  Goal: Skin integrity is maintained or improved  Description: INTERVENTIONS:  - Identify patients at risk for skin breakdown  - Assess and monitor skin integrity  - Assess and monitor nutrition and hydration status  - Monitor labs   - Assess for incontinence   - Turn and reposition patient  - Assist with mobility/ambulation  - Relieve pressure over bony prominences  - Avoid friction and shearing  - Provide appropriate hygiene as needed including keeping skin clean and dry  - Evaluate need for skin moisturizer/barrier cream  - Collaborate with interdisciplinary team   - Patient/family teaching  - Consider wound care consult   Outcome: Progressing

## 2023-02-25 NOTE — PROGRESS NOTES
PICC team called by primary RN Trent Navarro that patient needs a port re-access  Power port needle used for re-access if patient requires a CT scan in the future, Port re-accessed without incident and flushed per protocol

## 2023-02-25 NOTE — PROGRESS NOTES
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  • s/p diverting loop colostomy/liver biopsy with subsequent chemotherapy 2/2022  • s/p hepatic resection and reversal of colostomy on 11/7/22  • s/p ex-lap with bowel resection/VAC placement 11/16/22  • s/p right hemicolectomy with partial descending colectomy, colocolonic anastomosis with loop ileostomy and mid line abdominal VAC placement 11/17  • s/p additional drains placed for a perisplenic abscess and splenic abscess 12/20/22 (then had 3 drains plus the already existing perc alvina tube)  • The 2 left lateral drains were removed by IR 1/31/23 as an OP  • Had previously been tx'd with Ertapenem  • This hospital stay had fever/leukocytosis/sepsis, started Meropenem and then to Vancomycin and Ertapenem  • Wound cx again = Ecoli ESBL  • LD Ertapenem 2/14, Vancomycin 2/16  • To IR 2/24 = Abscessogram of midline drain showed minimal persistent collection and the midline catheter was removed      Acute cholecystitis  • s/p percutaneous tube placement 12/8/22  • CT scan was concerning for cholecystitis --> to IR 2/24 = showed retraction of the alvina tube and interval occlusion of the cystic duct   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  Alvina tube was exchanged      PORT catheter  • On 2/24/23 in IR = port check showed probable small thrombus at tip of cathter, flushed/improved aspiration of the port     Hypertension  • Dc norvasc 2 5mg  • Monitor and resume if BP improves      Diabetes mellitus  • Home:  Lantus 8U qam/Lispro 4U TID  • Here:  Lantus 5U qhs/Lispro 4U TID     • Has a DEXCOM meter and a regular meter at home  • Continue DM diet and QID Accuchecks/SSI     ALEXY/CKD  • Stage III; baseline 1 2-1 4  • Creat was 2 5 on admission with a K+ level of 5 9  • Currently creat is 1 28     Anemia  • Transfused in December and January  • Hemoglobin is 7 5 asymptomatic  • Repeat monday     Situational depression  • Cymbalta      Left pleural effusion  • Thoracentesis 1/12 for 300ml  • Cyto from pleural fluid was negative for malignancy; cx = NG  • stable     Hyponatremia  • Mild  • Felt likely 2/2 SIADH in setting of malignancy  • Stable    Malnutrition  · Will try magic cup  · May need to adjust insulin for same  · Prefers premier protein banana flavored however we do not carry that here        DC planning: TBD  The above assessment and plan was reviewed and updated as determined by my evaluation of the patient on 2/25/2023      Labs:   Results from last 7 days   Lab Units 02/25/23  0552 02/24/23  0532   WBC Thousand/uL 10 23* 10 42*   HEMOGLOBIN g/dL 7 5* 7 2*   HEMATOCRIT % 23 6* 23 0*   PLATELETS Thousands/uL 328 301     Results from last 7 days   Lab Units 02/25/23  0552 02/24/23  0532   SODIUM mmol/L 132* 132*   POTASSIUM mmol/L 3 6 3 7   CHLORIDE mmol/L 103 101   CO2 mmol/L 21 20*   BUN mg/dL 16 14   CREATININE mg/dL 1 28 1 36*   CALCIUM mg/dL 7 8* 7 6*             Results from last 7 days   Lab Units 02/25/23  6945 02/24/23  2040 02/24/23  1626   POC GLUCOSE mg/dl 101 120 152*       Review of Scheduled Meds:  Current Facility-Administered Medications   Medication Dose Route Frequency Provider Last Rate   • acetaminophen  650 mg Oral Q4H PRN Resighini Luis M, DO     • amLODIPine  2 5 mg Oral Daily Resighini Luis M, DO     • aspirin  81 mg Oral Daily Resighini Luis M, DO     • atorvastatin  40 mg Oral Daily Resighini Luis M, DO     • collagenase   Topical Daily Resighini Luis M, DO     • DULoxetine  30 mg Oral Daily Resighini Luis M, DO     • heparin (porcine)  5,000 Units Subcutaneous Q8H [address] Resighini Luis M, DO     • insulin glargine  5 Units Subcutaneous HS Resighini Luis M, DO     • insulin lispro  1-6 Units Subcutaneous TID AC Resighini Luis M, DO     • insulin lispro  1-6 Units Subcutaneous HS [facility]     • insulin lispro  4 Units Subcutaneous TID With Meals [facility], DO     • melatonin  6 mg Oral HS PRN [facility] Anger, DO     • methocarbamol  500 mg Oral BID PRN [facility], DO     • ondansetron  4 mg Intravenous Q4H PRN [facility], DO     • oxyCODONE  2 5 mg Oral Q4H PRN [facility], DO     • oxyCODONE  5 mg Oral Q4H PRN [facility], DO     • pantoprazole  40 mg Oral BID AC [facility] Anger, DO     • simethicone  80 mg Oral 4x Daily (with meals and at bedtime) [facility] Phyllis, DO     • tamsulosin  0 4 mg Oral Daily With [facility], DO         Subjective/ HPI: Patient seen and examined  Patients overnight issues or events were reviewed with nursing or staff during rounds or morning huddle session  New or overnight issues include the following:     Pt seen in his chair  Looks so much better than last admission  No complaints overnight  Is interested in trying magic cups for nutritional supplements since he does not like any of the ensure/glucerna/boost    ROS:   A 10 point ROS was performed; negative except as noted above         Imaging:     No orders to display       *Labs /Radiology studies Reviewed  *Medications  reviewed and reconciled as needed  *Please refer to order section for additional ordered labs studies  *Case discussed with primary attending during morning huddle case rounds    Physical Examination:  Vitals:   Vitals:    02/25/23 0520 02/25/23 0844 02/25/23 0908 02/25/23 0918   BP: 132/85 138/60 99/76 98/66   BP Location: Left arm  Right arm Right leg   Pulse: 74      Resp: 19      Temp: 97 8 °F (36 6 °C)      TempSrc: Oral      SpO2: 96%          GEN: No apparent distress, interactive  NEURO: Alert and oriented x3  HEENT: Pupils are equal and reactive, EOMI, mucous membranes are moist, face symmetrical  CV: S1 S2 regular, no MRG, no peripheral edema noted  RESP: Lungs are clear bilaterally, no wheezes, rales or rhonchi noted, on room air, respirations easy and non labored  GI: Flat, soft non tender, non distended; +BS x4; Ileostomy LUQ soft brown stool; choley tube w/thick light tan drainage  : Voiding without difficulty  MUSC: Moves all extremities; generalized deconditioning  SKIN: pink, warm and dry, normal turgor, midabdominal dressing in place      The above physical exam was reviewed and updated as determined by my evaluation of the patient on 2/25/2023  Invasive Devices     Central Venous Catheter Line  Duration           Port A Cath 03/10/22 Right Chest 351 days          Drain  Duration           Ileostomy LUQ 99 days    Cholecystostomy Tube 1 day                   VTE Pharmacologic Prophylaxis: Heparin  Code Status: Level 1 - Full Code  Current Length of Stay: 1 day(s)      Total time spent:  30 minutes with more than 50% spent counseling/coordinating care  Counseling includes discussion with patient re: progress  and discussion with patient of his/her current medical state/information  Coordination of patient's care was performed in conjunction with primary service  Time invested included review of patient's labs, vitals, and management of their comorbidities with continued monitoring  In addition, this patient was discussed with medical team including physician and advanced extenders  The care of the patient was extensively discussed and appropriate treatment plan was formulated unique for this patient  Medical decision making for the day was made by supervising physician unless otherwise noted in their attestation statement  ** Please Note:  voice to text software may have been used in the creation of this document   Although proof errors in transcription or interpretation are a potential of such software**

## 2023-02-25 NOTE — ASSESSMENT & PLAN NOTE
• POA with creatinine 2 52; baseline 1-1 3  • Suspecting possible multifactorial cause in setting of dehydration and sepsis  • Improving back into baseline levels, Continue to monitor on labs, minimize any relative hypotension  Avoid nephrotoxic agents    • Creatinine 1 47 (3/13)

## 2023-02-25 NOTE — ASSESSMENT & PLAN NOTE
• Mild, most recently 131   • [place] likely2/2 SIADH in setting of malignancy  • Sodium bicarbonate 1300 mg BID  • Monitor BMP  • FR 1500

## 2023-02-25 NOTE — PROGRESS NOTES
OT Long Term Goals       02/25/23 0830   Rehab Team Goals   ADL Team Goal Patient will be independent with ADLs with least restrictive device upon completion of rehab program   Rehab Team Interventions   OT Interventions Self Care;Home Management; Therapeutic Exercise;Community Reintegration; Energy Conservation;Patient/Family Education   Eating Goal   Eating Goal 06  Independent - Patient completes the activity by him/herself with no assistance from a helper  Status Target goal - two weeks   Interventions Optimal Position   Grooming Goal   Oral Hygiene Goal 06  Independent - Patient completes the activity by him/herself with no assistance from a helper  Environment Stand at [facility] goal - two weeks   Intervention Assistive Device;Balance Work; Therapeutic Exercise; Tolerance Work   Bathing Goal   Shower/bathe self Goal 06  Independent - Patient completes the activity by him/herself with no assistance from a helper  Environment Sponge Bath   Status Target goal - two weeks   Intervention ADL Training;Assistive Device; Therapeutic Exercise   Upper Body Dressing Goal   Upper body dressing Goal 06  Independent - Patient completes the activity by him/herself with no assistance from a helper  Status Target goal - two weeks   Intervention Assistive Device;Balance Work; Therapeutic Exercise; Tolerance Work   Lower Body Dressing Goal   Lower body dressing Goal 06  Independent - Patient completes the activity by him/herself with no assistance from a helper  Putting on/taking off footwear Goal 06  Independent - Patient completes the activity by him/herself with no assistance from a helper  Status Target goal - two weeks   Intervention Assistive Device;Balance Work; Therapeutic Exercise; Tolerance Work   Toileting Transfer Goal   Toilet transfer Goal 06  Independent - Patient completes the activity by him/herself with no assistance from a helper     Status Target goal - two weeks   Intervention ADL Training;Balance Work;Assistive Device   Toileting Goal   Toileting hygiene Goal 06  Independent - Patient completes the activity by him/herself with no assistance from a helper     Status Target goal - two weeks   Intervention ADL Training;Balance Work;Assistive Device   Meal Prep and Kitchen Mobility   Assist Level Supervision   Status Target goal - two weeks   Medication Management   Assist Level Independent   Status Target goal - two weeks

## 2023-02-25 NOTE — PLAN OF CARE
Problem: INFECTION - ADULT  Goal: Absence or prevention of progression during hospitalization  Description: INTERVENTIONS:  - Assess and monitor for signs and symptoms of infection  - Monitor lab/diagnostic results  - Monitor all insertion sites, i e  indwelling lines, tubes, and drains  - Monitor endotracheal if appropriate and nasal secretions for changes in amount and color  - [place] appropriate cooling/warming therapies per order  - Administer medications as ordered  - Instruct and encourage patient and family to use good hand hygiene technique  - Identify and instruct in appropriate isolation precautions for identified infection/condition  Outcome: Progressing     Problem: SAFETY ADULT  Goal: Patient will remain free of falls  Description: INTERVENTIONS:  - Educate patient/family on patient safety including physical limitations  - Instruct patient to call for assistance with activity   - Consult OT/PT to assist with strengthening/mobility   - Keep Call bell within reach  - Keep bed low and locked with side rails adjusted as appropriate  - Keep care items and personal belongings within reach  - Initiate and maintain comfort rounds  - Make Fall Risk Sign visible to staff  - Offer Toileting every 2 Hours, in advance of need  - Initiate/Maintain alarm  - Obtain necessary fall risk management equipment:   - Apply yellow socks and bracelet for high fall risk patients  - Consider moving patient to room near nurses station  Outcome: Progressing

## 2023-02-25 NOTE — PROGRESS NOTES
[facility] PT VINITA   02/25/23 1230   Patient Data   Rehab Impairment Impairment of mobility, safety and Activities of Daily Living (ADLs) due to Debility:  16  Debility (Non-cardiac/Non-pulmonary)   Etiologic Diagnosis debility secondary to sepsis   Date of Onset 02/07/23   Support System   Name Vika   Relationship spouse   Able to provide 24 hour supervision No   Able to provide physical help? Yes   Home Setup   Type of Home Multi Level   Method of Entry Stairs  (no HR, pt previously used RW on steps)   Number of Stairs 2   Number of Stairs in Home   (6-7 + landing + 5 to 2nd floor b/l HR; to basement 3-4 + landing + 6-7 1 HR only)   First Floor Bathroom Half   Second Floor Bathroom Full; Shower   First Floor Setup Available Yes   Home Modification Comment pt reports having first floor setup at home with hospital bed on first floor but his goal is to be able to access 2nd floor along with basement as pt reports he has recumbant bike in basement and goal is to get on it   Available Equipment [address] [employer]   (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, 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)   Transportation Family/friends drive   Prior Device(s) Used Wheelchair;Roller Walker   Prior Level of Function   Indoor-Mobility (Ambulation) 2  Needed Some Help - Patient needed a partial assistance from another person to complete activities  Stairs 2  Needed Some Help - Patient needed a partial assistance from another person to complete activities  Functional Cognition 3  Independent - Patient completed the activities by him/herself, with or without an assistive device, with no assistance from a helper  Prior Assistance Needed for Driving;Household Chores/Cleaning;Meal Preparation;Medication Management;Money Management; Shopping   Prior Device Used A  Manual wheelchair;D   Cherre Pouch in the Last Year   Number of falls in the past 12 months 2  (fell while attempting to get into house on stairs) Type of Injury Associated with Fall No injury   Patient Preference   Nickname (Patient Preference)   Russell Alexander   Restrictions/Precautions   Precautions Fall Risk;Contact/isolation;Multiple lines;Pain;Supervision on toilet/commode  (ilieostomy, cholecystostomy drain)   Weight Bearing Restrictions No   ROM Restrictions No   Pain Assessment   Pain Assessment Tool 0-10   Pain Score 5   Pain Location/Orientation Orientation: Right;Location: Abdomen   Effect of Pain on Daily Activities tolerated all mobilities but does state that at times if he turns a certain way or moves too quickly pain can increase to 10/10   Transfer Bed/Chair/Wheelchair   Type of Assistance Needed Physical assistance   Physical Assistance Level 25% or less   Comment minAx1 no AD   Chair/Bed-to-Chair Transfer CARE Score 3   Roll Left and Right   Comment (S)  assess next session   Sit to Lying   Type of Assistance Needed Supervision   Comment increased time to complete, HOB elevated for comfort as pt has hospital bed at home   Sit to Lying CARE Score 4   Lying to Sitting on Side of Bed   Comment (S)  assess next session   Sit to Stand   Type of Assistance Needed Physical assistance   Physical Assistance Level 25% or less   Comment no AD   Sit to Stand CARE Score 3   Picking Up Object   Comment (S)  assess next session   Car Transfer   Comment (S)  assess next session   Ambulation   Primary Mode of Locomotion Prior to Admission Walk   Distance Walked (feet) 50 ft  (x2)   Assist Device Hand Hold   Gait Pattern Inconsistant Josefina; Slow Josefina;Decreased foot clearance;Narrow LUIS;Shuffle;Step to; Improper weight shift   Limitations Noted In Balance; Coordination; Endurance; Heel Strike;Posture; Safety;Speed;Strength;Swing;Sensation   Provided Assistance with: Balance;Trunk Support   Walk Assist Level Minimum Assist;Chair Follow   Findings pt reports his goal is to dc home w/ use of SPC or no AD   Therefore performed mobilities w/o use of RW as pt reports he was able to walk approx 10' at home w/o AD PTA  Please cont to work w/o AD as tolerated by pt   Walk 10 Feet   Type of Assistance Needed Physical assistance   Physical Assistance Level Total assistance   Comment CF for safety due to fatigue; recommend pt use RW with nsg staff at this time for safety   Walk 10 Feet CARE Score 1   Walk 50 Feet with Two Turns   Type of Assistance Needed Physical assistance   Physical Assistance Level Total assistance   Comment CF for safety due to fatigue; recommend pt use RW with nsg staff at this time for safety   Walk 50 Feet with Two Turns CARE Score 1   Walk 150 Feet   Comment fatigued at 48'   Reason if not Attempted Safety concerns   Walk 150 Feet CARE Score 88   Walking 10 Feet on Uneven Surfaces   Comment (S)  trial next session as able   Reason if not Attempted Safety concerns   Walking 10 Feet on Uneven Surfaces CARE Score 88   Wheelchair mobility   Findings [place] at baseline, no goals set   Wheel 50 Feet with Two Turns   Type of Assistance Needed Independent   Wheel 50 Feet with Two Turns CARE Score 6   Wheel 150 Feet   Type of Assistance Needed Independent   Wheel 150 Feet CARE Score 6   Curb or Single Stair   Comment (S)  to be trialed when pt able   Reason if not Attempted Safety concerns   1 Step (Curb) CARE Score 88   4 Steps   Reason if not Attempted Safety concerns   4 Steps CARE Score 88   12 Steps   Reason if not Attempted Safety concerns   12 Steps CARE Score 88   Comprehension   QI: Comprehension 4  Undestands: Clear comprehension without cues or repetitions   Expression   QI: Expression 4   Express complex messages without difficulty and with speech that is clear and easy to [place]   RLE Assessment   RLE Assessment   (at hip flexor 3+/5, otherwise grossly 4+/5)   LLE Assessment   LLE Assessment   (at hip flexor 3+/5, otherwise grossly 4+/5)   Sensation   Light Touch Partial deficits in the RUE;Partial deficits in the LUE;Partial deficits in the RLE;Partial deficits in the LLE  (reports neuropathy in hands and feet)   Propioception No apparent deficits   Cognition   Overall Cognitive Status [facility]   Arousal/Participation Alert; Cooperative   Attention Within functional limits   Orientation Level Oriented X4   Memory Within functional limits   Following Commands Follows all commands and directions without difficulty   Comments pleasant and motivated to participate in skilled PT tx   Objective Measure   PT Measure(s) (S)  can perform TUG, 5x STS for baseline measurment and fall risk assessment   PT Findings educated pt on ELOS 2 weeks, goals to be set at [place] with LRAD, pt to receive 3 hours of therapy 5 days per week  Pt motivated to participate in tx, reports understanding of need to ring for staff A in room  Noted pt with slight dizziness upon initial stance, BPs monitored with dinamap and noted significant drop from sitting to stand, 106/73 to 79/56 respectively however pt symptoms not matching significant drop in BP  PT took BP manually 102/70, however BP is very faint and difficult to hear  Attempted manual BP in standing x2, again BP faint but appeared to be low, however, pt only with mild symptoms of dizziness  Dr Jarret Morrison presented and stated systolic BP in sitting around 118 however BP is very faint  She believes BP is higher than what we are hearing and recommends pt eat salty snack prior to therapy session such as pretzels, chips, cheese its which she provided pt with during session  Pt ate approx 1/4 of cheeze it snack during session and went based off of pt sx's per recommendation by Dr Jarret Morrison and pt then asymptomatic t/o rest of tx  Dr Jarret Morrison also recommending donning b/l knee high TEDS, obtained size medium and donned during session, educated pt to have staff remove at [facility]  Standing weight obtainted per request of nsg staff     Therapeutic Exercise   Therapeutic Exercise/Activity APs, LAQs performed 2-3' post standing to help improve BP   Discharge Information Vocational Plan Retired/not working  (pt reports he would like to return to work even if only for 2 weeks)   Barriers to Return to [employer]; Endurance   Patient's Discharge Plan d/c home at [place] [id number] level with family support   Patient's Rehab Expectations "to be as strong and independent as possible even without a walker if possible"   Barriers to Discharge Home Decreased Strength;Decreased Endurance;Pain; Safety Considerations   Impressions Pt is a 61 male who presents to [facility] with dx of debility due to sepsis  Pt was seen for a high complexity evaluation with comorbidities affecting pt's performance at time of evaluation including sepsis, chronic abdominal wound with foul smelling draining s/p bedside debridement, intra-abdominal abscesses, ESBL and E coli bacteremia/abscess formation, ALEXY, nephrolithiasis, b/l pleural effusions, elevated alkaline phophatase level, hypokalemia, hyponatremia, colon cancer with mets to liver, anemia, HTN, T2DM, HLD  Refer to preadmission screen for more comprehensive list  Of note pt recently with [facility] stay, d/c'd home 1/17/23 with use of w/c in home primarily, RW for transfers  Pt reports he as progressing with home PT, able to utilize RW more and even amb w/o use of AD  Goals are to cont to not utilize AD if possible upon d/c  PTA pt lives with wife, daughter and son in a [facility] with 2 ADRIAN  Pt had first floor setup but would like to be able to access all levels of his home if possible upon d/c  Upon evaluation pt presents with the following impairments: decreased strength, imbalance, decreased endurance, fatigue and sensation deficits  Pt also with questionable low Bps during session, occasionally feeling dizzy, however, continue to monitor primarily on symptoms but cont to monitor BP as well, recommend manual cuff as dinamap giving inaccurate readings   Pt presents with the following functional barriers to d/c home: steps inside of home, requires external assistance for all mobilities, increased fall risk due to impairments and inaccessible home environment  Pt is a good rehab candidate with ELOS 2 weeks and goals set at [place]  POC to focus on gait training, transfer training, stair training, neuromuscular re-education, improving pt's activity tolerance and endurance, LE strengthening, balance interventions and assessment for appropriate AD     PT Therapy Minutes   PT Time In 1230   PT Time Out 1400   PT Total Time (minutes) 90   PT Mode of treatment - Individual (minutes) 90   PT Mode of treatment - Concurrent (minutes) 0   PT Mode of treatment - Group (minutes) 0   PT Mode of treatment - Co-treat (minutes) 0   PT Mode of Treatment - Total time(minutes) 90 minutes   PT Cumulative Minutes 90   Cumulative Minutes   Cumulative therapy minutes 180

## 2023-02-25 NOTE — ASSESSMENT & PLAN NOTE
Midline abdominal wound with initially foul smelling drainage  Now improved per patient with red granular base and slough noted  Santyl and wet to dry dressing  Consult wound care   Daily dressing changes  Healing

## 2023-02-25 NOTE — ASSESSMENT & PLAN NOTE
- Smaller abscesses on recent studies now with no active drains  At risk for further complications    - Lower theshold for abdominal CT if develops fevers or leukocytosis-  - MRCP revealed abscess cavity increasing 5 1cm, plan for IR drain placement 3/13   - LUQ abscess drain with purulent drainage

## 2023-02-25 NOTE — ASSESSMENT & PLAN NOTE
Complex history with chronological details below:    2/22: S/P diverting colostomy, liver biopsy +Chemo tx  11/7/22: Hepatic resection and colostomy reversal  11/16/22: Exploratory laparotomy with bowel resection and VAC placement   11/17/22: Right hemicolectomy, partial descending colon resection, colocolonic anastomosis with loop ileosotmy and midline VAC placement  12/20/22: Drains placed, total of 3 drains + perc alvina tube + ileostomy  1/31/23: 2 of 3 drains removed  This hospital stay had fever/leukocytosis/sepsis, started Meropenem and then to Vancomycin and Ertapenem  Wound cx again = Ecoli ESBL  LD Ertapenem 2/14, Vancomycin 2/16 2/24: Abscessogram of midline drain showed minimal persistent collection and the midline catheter was removed  Perc Alvina tube exchanged    03/06- IR cholecystostomy tube check, capped  • Patient does not wish to follow with [place] [facility] anymore as it is too far of a drive; would like to establish with [place] [id number] oncology (already follows with Dr Agata Briscoe)  • Follows with Dr Yon Clinton as outpt

## 2023-02-25 NOTE — ASSESSMENT & PLAN NOTE
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

## 2023-02-25 NOTE — ASSESSMENT & PLAN NOTE
RUQ US revealed 7 mm nonobstructing right intrarenal calculus  No hydronephrosis noted on CT a/p  Asymptomatic at this time     • Flomax  • Monitor outpt, symptoms

## 2023-02-25 NOTE — ASSESSMENT & PLAN NOTE
Noted the development of foul-smelling drainage and increasing pain from his chronic abdominal wound with leukocytosis, tachypnea, tachycardia  • Purulent drainage from abdominal wound  • Hx of colon CA w/ extensive abd surgical- adenocarcinoma of transverse colon section  • Patient has several abdominal drains in place, with CT showing improvement in the fluid collections at the left liver lobe, perisplenic area, and the left retroperitoneum inferior to the spleen  • Completed Vancomycin and Ertapenem  • S/p bedside midline wound debridement + wet dressing 2/8 with general surgery  • S/p IR tube check 2/17/2023, drains kept in place  Discussed with IR, follow up in 1 month  • Given ongoing low grade fever and worsening leukocytosis, ordered repeat blood cultures and repeat CT a/p    CT a/p showing moderate R pleural effusion, imaging concerning for cholecystitis  Discussed case with surgery, IR  IR will reposition drain today- Radha tube check showed retraction of the radha tube   Interval occlusion of the cystic duct   Dino-enteric fistula persisted  Exchange of radha tube 2/24  • Continue to monitor off of antibiotics, low threshold to re-consult ID, but stable at this time  • Debility from Sepsis in addition to 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  • Pain control, anxiety management  • PT/OT  • 03/06 WBC 15 85, Hbg 7 7, Na+ 128, creatinine 1 66, total bilirubin, AST/ALT/Alk Phos elevated  • Consulted GI, deferred to IR for tube check  • IVF NS 1L  - Cholecystostomy drain check findings: diagnostic cholangiogram demonstrating filling of cystic and common bile duct, minimal pain, no leaking, slight adjustment of tube further into gallbladder body   Tube capped, can consider removal if patient passes capping trial in 2 weeks and if surgery is in agreement  - c/s Surgical Oncology, Dr Petrona Foss in agreement with IR recommendations  - GI consulted, recommending MRI/MRCP r/o obstruction 03/09, trend LFT  -nephrology consulted- sodium bicarb 1300 mg BID, d/c sodium tabs, midodrine 5 mg  - MRCP- showed increased 5 1 cm abscess cavity; plan for IR 3/13 for abscess drain and right thoracentesis  - ID started Ertapenem 03/12

## 2023-02-25 NOTE — PROGRESS NOTES
OT Initial Evaluation        02/25/23 4430   Patient Data   Rehab Impairment Impairment of mobility, safety and Activities of Daily Living (ADLs) due to Debility:  16  Debility (Non-cardiac/Non-pulmonary)   Etiologic Diagnosis debility secondary to sepsis   Date of Onset 02/07/23   Support System   Name Pt reports residing with supportive wife, son and daughter who are teenagers  Pt reports that his wife does not work during the day and will be able to assist as able at d/c    Relationship spouse   Able to provide 24 hour supervision No   Able to provide physical help? Yes   Home Setup   Type of Home Multi Level   Method of Entry Stairs   Number of Stairs 2  (through garage)   Number of Stairs in Home 12  (FF to second floor)   First Floor Bathroom Half   Second Floor Bathroom Full; Shower   First Floor Setup Available Yes   Home Modifications Necessary? No   Available Equipment Wheelchair;Roller Walker   Baseline Information   Vocation   (worked on [employer] for 20 years but has not worked since Feb 2022 )   Transportation Family/friends drive   Prior Device(s) [id number]   Prior IADL Participation   Money Management   (wife completed task)   Meal Preparation   (wife completed task)   Laundry   (wife completed task)   Home Cleaning   (wife completed task)   Prior Level of Function   Self-Care 2  Needed Some Help - Patient needed a partial assistance from another person to complete activities  Indoor-Mobility (Ambulation) 2  Needed Some Help - Patient needed a partial assistance from another person to complete activities  Stairs 2  Needed Some Help - Patient needed a partial assistance from another person to complete activities  Functional Cognition 3  Independent - Patient completed the activities by him/herself, with or without an assistive device, with no assistance from a helper     Prior Assistance Needed for Driving;Household Chores/Cleaning;Meal Preparation;Medication Management;Money Management; Shopping   Prior Device Used D  Walker;A  Manual wheelchair   Falls in the Last Year   Number of falls in the past 12 months 2  (pt reports falling s/p DC from [facility] trying to ambulate up the front stairs of his house)   Type of Injury Associated with Fall No injury   Patient Preference   [place] (Patient Preference) Joselyn Evangelista   Psychosocial   Psychosocial (WDL) WDL   Restrictions/Precautions   Precautions Fall Risk;Contact/isolation;Multiple lines;Pain;Supervision on toilet/commode  (ilieostomy and cholecystostomy drain)   Pain Assessment   Pain Assessment Tool 0-10   Pain Score 6   Pain Location/Orientation Orientation: Right;Location: Abdomen  (at cholecystostomy drain site)   Hospital Pain Intervention(s) Repositioned;Relaxation technique; Rest;Shower/Bath   Eating Assessment   Type of Assistance Needed Set-up / clean-up   Physical Assistance Level No physical assistance   Comment seated in recliner   Eating CARE Score 5   Oral Hygiene   Type of Assistance Needed Incidental touching   Physical Assistance Level No physical assistance   Comment CGA in stance at sink for ~3 minutes before verbalizing needing a rest break and having mild dizziness  if completed seated, pt would be mod I but pt would like to increase overall standing tolerance   Oral Hygiene CARE Score 4   Grooming   Able To Comb/Brush Hair;Wash/Dry Face;Brush/Clean Teeth;Wash/Dry Hands   Limitation Noted In Strength   Findings in stance at sink   Tub/Shower Transfer   Reason Not Assessed Sponge Bath  (pt sponge bathes at baseline and plans to continue to do so upon DC home)   Shower/Bathe Self   Type of Assistance Needed Physical assistance   Physical Assistance Level 25% or less   Comment seated sponge bath completed as pt was sponge bathing at home PTA and plan is to continue to do so upon DC home  min A required for posterior bathing only  pt able to bathe distal LE with leg crossing technique     Shower/Bathe Self CARE Score 3   Bathing Assessed Bath Style Sponge Bath   Anticipated D/C Bath Style Sponge Bath   Able to Wash/Rinse/Dry (body part) Left Arm;Right Arm;L Upper Leg;R Upper Leg;L Lower Leg/Foot;R Lower Leg/Foot;Chest;Abdomen;Buttocks   Limitations Noted in Endurance;Strength   Positioning Seated;Standing   Dressing/Undressing Clothing   Type of Assistance Needed Supervision   Physical Assistance Level No physical assistance   Comment gathered clothing items for pt  seated at EOB  Upper Body Dressing CARE Score 4   Type of Assistance Needed Incidental touching   Physical Assistance Level No physical assistance   Comment CGA in stance when donning over hips and inc time while seated at EOB to simulate baseline functioning   Lower Body Dressing CARE Score 4   Putting On/Taking Off Footwear   Type of Assistance Needed Supervision   Physical Assistance Level No physical assistance   Comment seated at EOB to simulate baseline functioning   Putting On/Taking Off Footwear CARE Score 4   [address]   Reason if not Attempted (S)  Refused to perform  (pt declined need for toileting during todays session  please complete at next possible session )   Fred[address]eg[address] Score 7   Toilet Transfer   Reason if not Attempted (S)  Refused to perform  (pt declined need for toileting during todays session  please complete at next possible session )   Toilet Transfer CARE Score 7   Transfer Bed/Chair/Wheelchair   Type of Assistance Needed Physical assistance   Physical Assistance Level 25% or less   Comment min A with RW which pt was using at home   completed one SPT without an AD as per pts request which was also min A with inc balance deficits in unsupported stance   Chair/Bed-to-Chair Transfer CARE Score 3   Lying to Sitting on Side of Bed   Type of Assistance Needed Physical assistance   Physical Assistance Level 25% or less   Comment Ada to elevate trunk with HOB flat   Lying to Sitting on Side of Bed CARE Score 3   Sit to Stand   Type of Assistance Needed Physical assistance   Physical Assistance Level 25% or less   Comment with RW   Sit to Stand CARE Score 3   Comprehension   QI: Comprehension 4  Undestands: Clear comprehension without cues or repetitions   Expression   QI: Expression 4  Express complex messages without difficulty and with speech that is clear and easy to [place]   Memory   Recognize People Yes   Remember Routine Yes   Initiates Tasks Yes   Short-Term Intact   Long Term Intact   Recalls Precaution Yes   Coordination   Movements are Fluid and Coordinated 1   Cognition   Overall Cognitive Status [facility]   Arousal/Participation Alert; Cooperative   Attention Within functional limits   Orientation Level Oriented X4   Memory Within functional limits   Following Commands Follows all commands and directions without difficulty   Discharge [address] Retired/not working  (pt reports that within the next year or so he would like to return to [place] pending his medical status)   Barriers to Return to [employer] Access;Skill Set Limitation;Transportation Issues;Strength; Endurance;Communication   Patient's Discharge Plan DC home at Mod I/supv level   Patient's Rehab Expectations "I think I can surpass how I was when I left here before  I want to go home at an independent level"   Barriers to Discharge Home Decreased Strength;Decreased Endurance;Pain; Safety Considerations   Impressions Pt is a 61 y o  male who was admitted on 2/7/23 due to increasing generalized weakness and foul odor for abdominal wound  Pt was dx with sepsis and transferred to [facility] to receive skilled therapy services  PMH includes colon cancer with mets, acute renal failure, DM, pulmonary edema, GERD, gastroparesis, HLD, HTN, SIRS and CKD  Current precautions include pain, fall risk, contact precautions, ilieostomy and cholecystostomy drain  See flowsheet for details of pts home setup, PLOF and current functional status   Pts impairments include pain, endurance, activity tolerance, functional mobility, balance, functional standing tolerance and strength  These impairments along with  steps to enter, difficulty performing IADLs and health management causes the inability to safely complete A/IADLs including Grooming, Bathing, UB dressing, LB dressing, Toileting, Toilet transfer, Tub/shower Transfer and IADL Management  Pt presents with good rehab potential with motivation to participate and achieve goal of DC home  Pt is unsafe to DC home at this time, recommending 2 weeks to achieve independent with assistive device level goals with wheelchair  and RW  Plan to achieve stated goals with interventions focused on ADL Retraining , LB Dressing, IADL training , Kitchen Mobilty, Medication management , Functional Transfers, Functional Cognition, Standing tolerance, Standing balance , DME training/education, Family training/education, Energy conservation training/education, healthy coping education, Leisure and social pursuits and community re-integration     OT Therapy Minutes   OT Time In 0830   OT Time Out 1000   OT Total Time (minutes) 90   OT Mode of treatment - Individual (minutes) 90   OT Mode of treatment - Concurrent (minutes) 0   OT Mode of treatment - Group (minutes) 0   OT Mode of treatment - Co-treat (minutes) 0   OT Mode of Treatment - Total time(minutes) 90 minutes   OT Cumulative Minutes 90   Cumulative Minutes   Cumulative therapy minutes 90

## 2023-02-25 NOTE — ASSESSMENT & PLAN NOTE
Hx of pleural effusions with   Interval worsening of moderate right and small left pleural effusions  There is associated bibasilar atelectasis based on CTAP on 2/22    Monitor oxygen and breathing, may benefit from thoracentesis  - large pleural effusion- 3/13 right thoracentesis, 1 6 L removed

## 2023-02-25 NOTE — ASSESSMENT & PLAN NOTE
Hbg 8 6   Required transfusion on 2/16  Order type and screen, 1 unit pRBC (2/26)  - received 1 unit pRBC for symptomatic Hbg 7 1 3/11  Monitor CBC

## 2023-02-26 LAB
BACTERIA BLD CULT: NORMAL
BACTERIA BLD CULT: NORMAL
BASOPHILS # BLD AUTO: 0.06 THOUSANDS/ÂΜL (ref 0–0.1)
BASOPHILS NFR BLD AUTO: 1 % (ref 0–1)
EOSINOPHIL # BLD AUTO: 0 THOUSAND/ÂΜL (ref 0–0.61)
EOSINOPHIL NFR BLD AUTO: 0 % (ref 0–6)
ERYTHROCYTE [DISTWIDTH] IN BLOOD BY AUTOMATED COUNT: 17.2 % (ref 11.6–15.1)
GLUCOSE SERPL-MCNC: 154 MG/DL (ref 65–140)
GLUCOSE SERPL-MCNC: 163 MG/DL (ref 65–140)
GLUCOSE SERPL-MCNC: 191 MG/DL (ref 65–140)
GLUCOSE SERPL-MCNC: 200 MG/DL (ref 65–140)
HCT VFR BLD AUTO: 23.1 % (ref 36.5–49.3)
HGB BLD-MCNC: 7.1 G/DL (ref 12–17)
IMM GRANULOCYTES # BLD AUTO: 0.16 THOUSAND/UL (ref 0–0.2)
IMM GRANULOCYTES NFR BLD AUTO: 1 % (ref 0–2)
LYMPHOCYTES # BLD AUTO: 1.65 THOUSANDS/ÂΜL (ref 0.6–4.47)
LYMPHOCYTES NFR BLD AUTO: 14 % (ref 14–44)
MCH RBC QN AUTO: 25.7 PG (ref 26.8–34.3)
MCHC RBC AUTO-ENTMCNC: 30.7 G/DL (ref 31.4–37.4)
MCV RBC AUTO: 84 FL (ref 82–98)
MONOCYTES # BLD AUTO: 1.5 THOUSAND/ÂΜL (ref 0.17–1.22)
MONOCYTES NFR BLD AUTO: 13 % (ref 4–12)
NEUTROPHILS # BLD AUTO: 8.48 THOUSANDS/ÂΜL (ref 1.85–7.62)
NEUTS SEG NFR BLD AUTO: 71 % (ref 43–75)
NRBC BLD AUTO-RTO: 0 /100 WBCS
PLATELET # BLD AUTO: 313 THOUSANDS/UL (ref 149–390)
PMV BLD AUTO: 10.6 FL (ref 8.9–12.7)
RBC # BLD AUTO: 2.76 MILLION/UL (ref 3.88–5.62)
WBC # BLD AUTO: 11.85 THOUSAND/UL (ref 4.31–10.16)

## 2023-02-26 PROCEDURE — 0HBRXZZ EXCISION OF TOE NAIL, EXTERNAL APPROACH: ICD-10-PCS | Performed by: PODIATRIST

## 2023-02-26 PROCEDURE — 30233N1 TRANSFUSION OF NONAUTOLOGOUS RED BLOOD CELLS INTO PERIPHERAL VEIN, PERCUTANEOUS APPROACH: ICD-10-PCS | Performed by: INTERNAL MEDICINE

## 2023-02-26 RX ORDER — HYDROXYZINE HYDROCHLORIDE 25 MG/1
25 TABLET, FILM COATED ORAL
Status: DISCONTINUED | OUTPATIENT
Start: 2023-02-26 | End: 2023-02-28

## 2023-02-26 RX ORDER — HYDROXYZINE 50 MG/1
50 TABLET, FILM COATED ORAL
Status: DISCONTINUED | OUTPATIENT
Start: 2023-02-26 | End: 2023-02-26

## 2023-02-26 RX ADMIN — TAMSULOSIN HYDROCHLORIDE 0.4 MG: 0.4 CAPSULE ORAL at 16:56

## 2023-02-26 RX ADMIN — SIMETHICONE 80 MG: 80 TABLET, CHEWABLE ORAL at 16:56

## 2023-02-26 RX ADMIN — SIMETHICONE 80 MG: 80 TABLET, CHEWABLE ORAL at 21:33

## 2023-02-26 RX ADMIN — INSULIN LISPRO 2 UNITS: 100 INJECTION, SOLUTION INTRAVENOUS; SUBCUTANEOUS at 11:19

## 2023-02-26 RX ADMIN — INSULIN LISPRO 2 UNITS: 100 INJECTION, SOLUTION INTRAVENOUS; SUBCUTANEOUS at 08:17

## 2023-02-26 RX ADMIN — INSULIN LISPRO 4 UNITS: 100 INJECTION, SOLUTION INTRAVENOUS; SUBCUTANEOUS at 11:19

## 2023-02-26 RX ADMIN — HYDROXYZINE HYDROCHLORIDE 25 MG: 25 TABLET ORAL at 21:44

## 2023-02-26 RX ADMIN — INSULIN LISPRO 4 UNITS: 100 INJECTION, SOLUTION INTRAVENOUS; SUBCUTANEOUS at 08:17

## 2023-02-26 RX ADMIN — INSULIN LISPRO 1 UNITS: 100 INJECTION, SOLUTION INTRAVENOUS; SUBCUTANEOUS at 16:56

## 2023-02-26 RX ADMIN — INFLUENZA A VIRUS A/WISCONSIN/588/2019 (H1N1) RECOMBINANT HEMAGGLUTININ ANTIGEN, INFLUENZA A VIRUS A/DARWIN/6/2021 (H3N2) RECOMBINANT HEMAGGLUTININ ANTIGEN, INFLUENZA B VIRUS B/AUSTRIA/1359417/2021 RECOMBINANT HEMAGGLUTININ ANTIGEN, AND INFLUENZA B VIRUS B/PHUKET/3073/2013 RECOMBINANT HEMAGGLUTININ ANTIGEN 0.5 ML: 45; 45; 45; 45 INJECTION INTRAMUSCULAR at 11:18

## 2023-02-26 RX ADMIN — DULOXETINE HYDROCHLORIDE 30 MG: 30 CAPSULE, DELAYED RELEASE ORAL at 08:39

## 2023-02-26 RX ADMIN — MELATONIN 6 MG: at 21:33

## 2023-02-26 RX ADMIN — INSULIN LISPRO 1 UNITS: 100 INJECTION, SOLUTION INTRAVENOUS; SUBCUTANEOUS at 21:53

## 2023-02-26 RX ADMIN — COLLAGENASE SANTYL 1 APPLICATION.: 250 OINTMENT TOPICAL at 12:53

## 2023-02-26 RX ADMIN — ASPIRIN 81 MG CHEWABLE TABLET 81 MG: 81 TABLET CHEWABLE at 08:39

## 2023-02-26 RX ADMIN — PANTOPRAZOLE SODIUM 40 MG: 40 TABLET, DELAYED RELEASE ORAL at 06:19

## 2023-02-26 RX ADMIN — PANTOPRAZOLE SODIUM 40 MG: 40 TABLET, DELAYED RELEASE ORAL at 16:56

## 2023-02-26 RX ADMIN — INSULIN GLARGINE 5 UNITS: 100 INJECTION, SOLUTION SUBCUTANEOUS at 21:33

## 2023-02-26 RX ADMIN — SIMETHICONE 80 MG: 80 TABLET, CHEWABLE ORAL at 11:16

## 2023-02-26 RX ADMIN — ATORVASTATIN CALCIUM 40 MG: 40 TABLET, FILM COATED ORAL at 08:39

## 2023-02-26 RX ADMIN — OXYCODONE HYDROCHLORIDE 5 MG: 5 TABLET ORAL at 21:54

## 2023-02-26 RX ADMIN — SIMETHICONE 80 MG: 80 TABLET, CHEWABLE ORAL at 08:39

## 2023-02-26 RX ADMIN — INSULIN LISPRO 4 UNITS: 100 INJECTION, SOLUTION INTRAVENOUS; SUBCUTANEOUS at 16:56

## 2023-02-26 NOTE — CONSULTS
Podiatry - Consultation    Patient Information:   Daniela Paul 61 y o  male MRN: [id number]  Unit/Bed#: [facility] 368-41 Encounter: [id number]  PCP: Tre Espinal MD  Date of Admission:  2/24/2023  Date of Consultation: 02/26/23  Requesting Physician: Arash Salinas DO      ASSESSMENT:    Daniela Paul is a 61 y o  male with:    1  Elongated painful nails   2  Type 2 diabetes mellitus  3  Hypertension  4  Hyperlipidemia    PLAN:    · Elongated nails reduced to normal length using nail nipper with no incident  · Patient can follow up with podiatry outpatient as needed  Podiatry signing off, consult again if necessary  · Elevation and offloading on green foam wedges or pillows when non-ambulatory  · Rest of care per primary team   · Will discuss this plan with my attending and update as needed  Weightbearing status: Weightbearing as tolerated    SUBJECTIVE:    History of Present Illness:    Daniela Paul is a 61 y o  male who is originally admitted 2/24/2023 due to sepsis  Patient has a past medical history of type 2 diabetes mellitus, hypertension and hyperlipidemia  We are consulted for elongated toenails  Patient states that he has not been able to trim his nails since November because he has been in and out of the hospital  He states that he does not normally see a podiatrist  He reports that his right hallux toenail is long and digging into second digit, causing pain  Reports decreased sensation to b/l feet  Denies N/V/F/CP/SOB  Review of Systems:    Constitutional: Negative  HENT: Negative  Eyes: Negative  Respiratory: Negative  Cardiovascular: Negative  Gastrointestinal: Negative  Musculoskeletal: Negative   Skin:elongated painful nails   Neurological: decreased sensation b/l feet   Psych: Negative       Past Medical and Surgical History:     Past Medical History:   Diagnosis Date   • Abdominal pain 03/12/2022   • Acute renal failure ([facility] [address] )     [id number] resolved   • Acute respiratory failure with hypoxia ([place] [address] ) 11/12/2022   • Bacteremia 11/12/2022   • Cancer ([place] [address] )    • Diabetes mellitus ([place] [address] )    • Enteritis 08/23/2016   • Flash pulmonary edema ([place] [address] ) 11/12/2022   • Gastroparesis due to DM ([place] [address] ) 08/23/2016   • GERD (gastroesophageal reflux disease)    • Hernia, ventral 08/04/2016   • Hyperlipidemia    • Hypertension    • Morbid obesity ([place] [address] ) 04/17/2018   • Postoperative visit 03/02/2022   • SIRS (systemic inflammatory response syndrome) ([place] [address] ) 03/12/2022   • Snoring    • Stage 3a chronic kidney disease ([place] [address] ) 02/19/2022       Past Surgical History:   Procedure Laterality Date   • COLONOSCOPY     • COLOSTOMY N/A 02/20/2022    Procedure: COLOSTOMY LOOP, diverting;  Surgeon: Mary Sutherland MD;  Location: [facility];  Service: General   • ESOPHAGOGASTRODUODENOSCOPY N/A 08/24/2016    Procedure: ESOPHAGOGASTRODUODENOSCOPY (EGD); Surgeon: Cami Connor MD;  Location: [facility];   Service:    • EXPLORATORY LAPAROTOMY W/ BOWEL RESECTION N/A 11/16/2022    Procedure: LAPAROTOMY EXPLORATORY W/ BOWEL RESECTION- VAC PLACEMENT;  Surgeon: Joselin Alvarado MD;  Location: [facility];  Service: Surgical Oncology   • EXPLORATORY LAPAROTOMY W/ BOWEL RESECTION N/A 11/17/2022    Procedure: LAPAROTOMY EXPLORATORY W/ BOWEL RESECTION;  Surgeon: Joselin Alvarado MD;  [facility];  Service: Surgical Oncology   • ILEOSTOMY N/A 11/17/2022    Procedure: Xavier Jaswinder;  Surgeon: Joselin Alvarado MD;  [facility];  Service: Surgical Oncology   • ILEOSTOMY CLOSURE N/A 11/7/2022    Procedure: REVERSAL COLOSTOMY;  Surgeon: Santiago Field MD;  [facility];  Service: Surgical Oncology   • IR CHOLECYSTOSTOMY TUBE CHECK AND/OR REMOVAL  2/24/2023   • IR CHOLECYSTOSTOMY TUBE CHECK/CHANGE/REPOSITION/REINSERTION/UPSIZE  12/12/2022   • IR CHOLECYSTOSTOMY TUBE PLACEMENT  12/8/2022   • IR DRAINAGE TUBE CHECK AND/OR REMOVAL  1/3/2023   • IR DRAINAGE TUBE CHECK/CHANGE/REPOSITION/REINSERTION/UPSIZE  1/12/2023 • IR DRAINAGE TUBE CHECK/CHANGE/REPOSITION/REINSERTION/UPSIZE  1/31/2023   • IR DRAINAGE TUBE CHECK/CHANGE/REPOSITION/REINSERTION/UPSIZE  2/10/2023   • IR DRAINAGE TUBE CHECK/CHANGE/REPOSITION/REINSERTION/UPSIZE  2/16/2023   • IR DRAINAGE TUBE CHECK/CHANGE/REPOSITION/REINSERTION/UPSIZE  2/24/2023   • IR DRAINAGE TUBE PLACEMENT  12/8/2022   • IR DRAINAGE TUBE PLACEMENT  12/20/2022   • IR PORT CHECK  2/24/2023   • IR PORT PLACEMENT  03/10/2022   • IR THORACENTESIS  1/12/2023   • KIDNEY STONE SURGERY     • LIVER BIOPSY LAPAROSCOPIC N/A 02/20/2022    Procedure: DIAGNOSTIC LAPAROSCOPIC LIVER BIOPSY, DIVERTING LOOP COLOSTOMY ;  Surgeon: Richa Norman MD;  [facility]: [facility];  Service: General   • LIVER LOBECTOMY N/A 11/7/2022    Procedure: SEGMENT 3 LIVER RESECTION, ABLATION, INTRAOPERATIVE U/S OF LIVER, PERITONEAL BIOPSY;  Surgeon: Yadira Ewing MD;  [facility]: [facility];  Service: Surgical Oncology   • RIGHT COLON RESECTION N/A 11/7/2022    Procedure: EX LAP, TRANVERSE COLON RESECTION;  Surgeon: Yadira Ewing MD;  [facility];  Service: Surgical Oncology   • TONSILLECTOMY     • UMBILICAL HERNIA REPAIR LAPAROSCOPIC N/A 04/26/2019    Procedure: LAPAROSCOPIC UMBILICAL HERNIA REPAIR;  Surgeon: Richa Norman MD;  [facility];  Service: General   • VAC DRESSING APPLICATION N/A 09/92/4187    Procedure: APPLICATION VAC DRESSING ABDOMEN/TRUNK;  Surgeon: Shanon Ramon MD;  [facility];  Service: Surgical Oncology       Meds/Allergies:    Medications Prior to Admission   Medication   • acetaminophen (TYLENOL) 325 mg tablet   • amLODIPine (NORVASC) 2 5 mg tablet   • aspirin 81 MG tablet   • atorvastatin (LIPITOR) 40 mg tablet   • Cholecalciferol (VITAMIN D3 PO)   • collagenase (SANTYL) ointment   • Continuous Blood Gluc Sensor (FreeStyle [place]rish 14 Day Sensor) MISC   • DULoxetine (CYMBALTA) 30 mg delayed release capsule   • insulin glargine (LANTUS) 100 units/mL subcutaneous injection   • insulin lispro (HumaLOG) 100 units/mL injection   • Insulin Pen Needle (B-D UF III MINI PEN NEEDLES) 31G X 5 MM MISC   • Insulin Pen Needle (BD Pen Needle Jessica 2nd Gen) 32G X 4 MM MISC   • Insulin Pen Needle (BD Pen Needle Jessica 2nd Gen) 32G X 4 MM MISC   • melatonin 3 mg   • methocarbamol (ROBAXIN) 500 mg tablet   • Multiple Vitamins-Minerals (multivitamin with minerals) tablet   • naloxone (NARCAN) 4 mg/0 1 mL nasal spray   • Needle, Disp, (HYPODERMIC NEEDLE) 23G X 1-1/2" MISC   • ondansetron (ZOFRAN-ODT) 4 mg disintegrating tablet   • Ostomy Supplies MISC   • oxyCODONE (ROXICODONE) 5 immediate release tablet   • pantoprazole (PROTONIX) 40 mg tablet   • simethicone (MYLICON) 80 mg chewable tablet   • sodium chloride, PF, 0 9 %   • tamsulosin (FLOMAX) 0 4 mg       Allergies   Allergen Reactions   • Shellfish-Derived Products - Food Allergy Anaphylaxis   • Erythromycin GI Intolerance       Social History:     Marital Status: /Civil Union    Substance Use History:   Social History     Substance and Sexual Activity   Alcohol Use Never     Social History     Tobacco Use   Smoking Status Never   Smokeless Tobacco Never     Social History     Substance and Sexual Activity   Drug Use No       Family History:    Family History   Problem Relation Age of Onset   • Diabetes Mother         mellitus   • Lung cancer Mother    • Coronary artery disease Father          OBJECTIVE:    Vitals:   Blood Pressure: 125/80 (02/26/23 0306)  Pulse: 90 (02/26/23 0306)  Temperature: 98 1 °F (36 7 °C) (02/26/23 0306)  Temp Source: Oral (02/26/23 0306)  Respirations: 18 (02/26/23 0306)  Height: 5' 9" (175 3 cm) (02/25/23 1257)  Weight - Scale: 81 5 kg (179 lb 9 6 oz) (02/25/23 1257)  SpO2: 96 % (02/26/23 0306)    Physical Exam:    General Appearance: Alert, cooperative, no distress  HEENT: Head normocephalic, atraumatic, without obvious abnormality  Heart: Normal rate and rhythm  Lungs: Non-labored breathing   No respiratory distress  Abdomen: Without distension  Psychiatric: AAOx3  Lower Extremity:    Vascular:   DP: Right: 2+ Left: 2+  PT: Right: 2+ Left: 2+  CRT < 3 seconds at the digits  +0/4 edema noted at bilateral lower extremities  Pedal hair is absent  Skin temperature is WNL bilaterally  Musculoskeletal:  MMT is 5/5 in all muscle compartments bilaterally  ROM at the 1st MPJ and ankle joint are WNL bilaterally with the leg extended  Pain on palpation of elongated toenails  No gross deformities noted  Dermatological:  No open wounds or lesions  Elongated toenails x 10, not brittle, not discolored, not dystrophic  Neurological:  Gross sensation is intact  Light touch is diminished  Protective sensation is diminished  Additional data:     Lab Results: I have personally reviewed pertinent labs including:    Results from last 7 days   Lab Units 02/26/23  0619   WBC Thousand/uL 11 85*   HEMOGLOBIN g/dL 7 1*   HEMATOCRIT % 23 1*   PLATELETS Thousands/uL 313   NEUTROS PCT % 71   LYMPHS PCT % 14   MONOS PCT % 13*   EOS PCT % 0     Results from last 7 days   Lab Units 02/25/23  0552 02/24/23  0532   POTASSIUM mmol/L 3 6 3 7   CHLORIDE mmol/L 103 101   CO2 mmol/L 21 20*   BUN mg/dL 16 14   CREATININE mg/dL 1 28 1 36*   CALCIUM mg/dL 7 8* 7 6*   ALK PHOS U/L  --  790*   ALT U/L  --  9*   AST U/L  --  51*           Cultures: I have personally reviewed pertinent cultures including:    Results from last 7 days   Lab Units 02/22/23  0959   BLOOD CULTURE  No Growth at 72 hrs  No Growth at 72 hrs  Imaging: I have personally reviewed pertinent reports in PACS  EKG, Pathology, and Other Studies: I have personally reviewed pertinent reports  Time Spent for Care: 30 minutes  More than 50% of total time spent on counseling and coordination of care as described above  ** Please Note: Portions of the record may have been created with voice recognition software   Occasional wrong word or "sound a like" substitutions may have occurred due to the inherent limitations of voice recognition software  Read the chart carefully and recognize, using context, where substitutions have occurred   **

## 2023-02-26 NOTE — PROGRESS NOTES
02/26/23 0850   Pain Assessment   Pain Assessment Tool 0-10   Pain Score 5   Pain Location/Orientation Orientation: Right;Location: Abdomen   Restrictions/Precautions   Precautions Fall Risk;Contact/isolation;Multiple lines;Pain;Supervision on toilet/commode  (ilieostomy and cholecystostomy drain)   Weight Bearing Restrictions No   ROM Restrictions No   Subjective   Subjective "I could only sleep for 2 hours at a time last night because they kept waking me"   Roll Left and Right   Type of Assistance Needed Supervision   Comment HOB flat w/o bedrails   Roll Left and Right CARE Score 4   Lying to Sitting on Side of Bed   Type of Assistance Needed Supervision   Comment HOB flat w/o bedrails   Lying to Sitting on Side of Bed CARE Score 4   Sit to Stand   Type of Assistance Needed Physical assistance   Physical Assistance Level 25% or less   Comment no AD   Sit to Stand CARE Score 3   Bed-Chair Transfer   Type of Assistance Needed Physical assistance   Physical Assistance Level 25% or less   Comment minAx1 with HHA, A for balance   Chair/Bed-to-Chair Transfer CARE Score 3   Car Transfer   Type of Assistance Needed Physical assistance   Physical Assistance Level 25% or less   Comment minAx1 with HHA   Car Transfer CARE Score 3   Walk 10 Feet   Type of Assistance Needed Physical assistance   Physical Assistance Level 26%-50%   Comment modAx1 R HHA   Walk 10 Feet CARE Score 3   Walk 50 Feet with Two Turns   Comment fatigued at 28' this session   Reason if not Attempted Safety concerns   Walk 50 Feet with Two Turns CARE Score 88   Walk 150 Feet   Reason if not Attempted Safety concerns   Walk 150 Feet CARE Score 88   Walking 10 Feet on Uneven Surfaces   Type of Assistance Needed Physical assistance   Physical Assistance Level 26%-50%   Comment min-modAx1 with HHA   Walking 10 Feet on Uneven Surfaces CARE Score 3   Ambulation   Primary Mode of Locomotion Prior to Admission Walk   Distance Walked (feet) 35 ft  (x1, 30'x1, 20'x3)   Assist Device Hand Hold   Gait Pattern Inconsistant Josefina; Slow Josefina;Decreased foot clearance;Narrow LUIS;Shuffle;Step to; Improper weight shift   Limitations Noted In Balance; Coordination; Endurance; Heel Strike;Posture;Speed;Strength;Swing   Provided Assistance with: Balance;Trunk Support   Walk Assist Level Minimum Assist;Moderate Assist   Findings with HHA, minAX1 with modAx1 during turns occassionally   Does the patient walk? 2  Yes   Curb or Single Stair   Style negotiated Single stair   Type of Assistance Needed Physical assistance   Physical Assistance Level 26%-50%   Comment b/l HRs, step to pattern   1 Step (Curb) CARE Score 3   4 Steps   Type of Assistance Needed Physical assistance   Physical Assistance Level 26%-50%   Comment b/l HRs, step to pattern   4 Steps CARE Score 3   12 Steps   Reason if not Attempted Safety concerns   12 Steps CARE Score 88   Stairs   Type Stairs   # of Steps 6   Weight Bearing Precautions Fall Risk   Assist Devices Bilateral Rail   Findings practiced with b/l railings this session, need to practice 2 steps w/o HR as well as stairs with single railing to mimic steps to basement   Picking Up Object   Type of Assistance Needed Physical assistance   Physical Assistance Level 25% or less   Comment with reacher to retrieve marker off of floor   Picking Up Object CARE Score 3   Other Comments   Comments TUG with R TAMIA min-modAx1 22 28 seconds; self selected gait speed 0 42 m/s with R TAMIA; educated pt both scores indicate pt at increased risk for falls   Assessment   Treatment Assessment Pt engaged in skilled PT session with focus on performing functional mobilities to capture baseline scores as well as performing outcome measures for baseline measurements  Continued to perform mobilities w/o use of AD this session as pt's goals are to d/c home without AD if possible or cane   Pt with increased LOB with initial turns this session requiring modAx1 to recover, otherwise minAx1 with [facility]  Outcome measures performed for baseline measurements and indicate pt would benefit from skilled PT to reduce risk for falls  At this time pt cont to function below his baseline and would benefit from ongoing skilled PT intervention with POC to focus on endurance tnrg, LE strengthening, gait and transfer trng with LRAD, stair trng so pt can safely access all areas of home, static and dynamic balance interventions to reduce risk for falls  Family/Caregiver Present no   Problem List Decreased strength;Decreased endurance; Impaired balance;Decreased mobility; Decreased safety awareness;Pain   Barriers to Discharge Inaccessible home environment; Other (Comment)   PT Barriers   Functional Limitation Car transfers; Ramp negotiation;Stair negotiation;Standing;Transfers; Walking   Plan   Treatment/Interventions Functional transfer training;LE strengthening/ROM; Elevations; Therapeutic exercise; Endurance training;Bed mobility;Gait training   Progress Progressing toward goals   Recommendation   PT Discharge Recommendation Home with home health rehabilitation   Equipment Recommended   (tbd)   PT Therapy Minutes   PT Time In 0850   PT Time Out 0930   PT Total Time (minutes) 40   PT Mode of treatment - Individual (minutes) 40   PT Mode of treatment - Concurrent (minutes) 0   PT Mode of treatment - Group (minutes) 0   PT Mode of treatment - Co-treat (minutes) 0   PT Mode of Treatment - Total time(minutes) 40 minutes   PT Cumulative Minutes 130   Therapy Time missed   Time missed?  No

## 2023-02-26 NOTE — PLAN OF CARE
Reviewed    Problem: PAIN - ADULT  Goal: Verbalizes/displays adequate comfort level or baseline comfort level  Description: Interventions:  - Encourage patient to monitor pain and request assistance  - Assess pain using appropriate pain scale  - Administer analgesics based on type and severity of pain and evaluate response  - Implement non-pharmacological measures as appropriate and evaluate response  - Consider cultural and social influences on pain and pain management  - Notify physician/advanced practitioner if interventions unsuccessful or patient reports new pain  Outcome: Progressing     Problem: INFECTION - ADULT  Goal: Absence or prevention of progression during hospitalization  Description: INTERVENTIONS:  - Assess and monitor for signs and symptoms of infection  - Monitor lab/diagnostic results  - Monitor all insertion sites, i e  indwelling lines, tubes, and drains  - Monitor endotracheal if appropriate and nasal secretions for changes in amount and color  - [place] appropriate cooling/warming therapies per order  - Administer medications as ordered  - Instruct and encourage patient and family to use good hand hygiene technique  - Identify and instruct in appropriate isolation precautions for identified infection/condition  Outcome: Progressing  Goal: Absence of fever/infection during neutropenic period  Description: INTERVENTIONS:  - Monitor WBC    Outcome: Progressing     Problem: SAFETY ADULT  Goal: Patient will remain free of falls  Description: INTERVENTIONS:  - Educate patient/family on patient safety including physical limitations  - Instruct patient to call for assistance with activity   - Consult OT/PT to assist with strengthening/mobility   - Keep Call bell within reach  - Keep bed low and locked with side rails adjusted as appropriate  - Keep care items and personal belongings within reach  - Initiate and maintain comfort rounds  - Make Fall Risk Sign visible to staff  - Offer Toileting every 4 Hours, in advance of need  - Apply yellow socks and bracelet for high fall risk patients  - Consider moving patient to room near nurses station  Outcome: Progressing  Goal: Maintain or return to baseline ADL function  Description: INTERVENTIONS:  -  Assess patient's ability to carry out ADLs; assess patient's baseline for ADL function and identify physical deficits which impact ability to perform ADLs (bathing, care of mouth/teeth, toileting, grooming, dressing, etc )  - Assess/evaluate cause of self-care deficits   - Assess range of motion  - Assess patient's mobility; develop plan if impaired  - Assess patient's need for assistive devices and provide as appropriate  - Encourage maximum independence but intervene and supervise when necessary  - Involve family in performance of ADLs  - Assess for home care needs following discharge   - Consider OT consult to assist with ADL evaluation and planning for discharge  - Provide patient education as appropriate  Outcome: Progressing  Goal: Maintains/Returns to pre admission functional level  Description: INTERVENTIONS:  - Set and communicate daily mobility goal to care team and patient/family/caregiver     - Collaborate with rehabilitation services on mobility goals if consulted  - Out of bed for toileting  - Record patient progress and toleration of activity level   Outcome: Progressing     Problem: DISCHARGE PLANNING  Goal: Discharge to home or other facility with appropriate resources  Description: INTERVENTIONS:  - Identify barriers to discharge w/patient and caregiver  - Arrange for needed discharge resources and transportation as appropriate  - Identify discharge learning needs (meds, wound care, etc )  - Arrange for interpretive services to assist at discharge as needed  - Refer to Case Management Department for coordinating discharge planning if the patient needs post-hospital services based on physician/advanced practitioner order or complex needs related to functional status, cognitive ability, or social support system  Outcome: Progressing     Problem: Prexisting or High Potential for Compromised Skin Integrity  Goal: Skin integrity is maintained or improved  Description: INTERVENTIONS:  - Identify patients at risk for skin breakdown  - Assess and monitor skin integrity  - Assess and monitor nutrition and hydration status  - Monitor labs   - Assess for incontinence   - Turn and reposition patient  - Assist with mobility/ambulation  - Relieve pressure over bony prominences  - Avoid friction and shearing  - Provide appropriate hygiene as needed including keeping skin clean and dry  - Evaluate need for skin moisturizer/barrier cream  - Collaborate with interdisciplinary team   - Patient/family teaching  - Consider wound care consult   Outcome: Progressing

## 2023-02-26 NOTE — PROGRESS NOTES
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  • s/p diverting loop colostomy/liver biopsy with subsequent chemotherapy 2/2022  • s/p hepatic resection and reversal of colostomy on 11/7/22  • s/p ex-lap with bowel resection/VAC placement 11/16/22  • s/p right hemicolectomy with partial descending colectomy, colocolonic anastomosis with loop ileostomy and mid line abdominal VAC placement 11/17  • s/p additional drains placed for a perisplenic abscess and splenic abscess 12/20/22 (then had 3 drains plus the already existing perc radha tube)  • The 2 left lateral drains were removed by IR 1/31/23 as an OP  • Had previously been tx'd with Ertapenem  • This hospital stay had fever/leukocytosis/sepsis, started Meropenem and then to Vancomycin and Ertapenem  • Wound cx again = Ecoli ESBL  • LD Ertapenem 2/14, Vancomycin 2/16  • To IR 2/24 = Abscessogram of midline drain showed minimal persistent collection and the midline catheter was removed  • Recent wound debridement by general surgery prior to transfer     Acute cholecystitis  • s/p percutaneous tube placement 12/8/22  • CT scan was concerning for cholecystitis --> to IR 2/24 = showed retraction of the radha tube and interval occlusion of the cystic duct   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  Radha tube was exchanged      PORT catheter  • On 2/24/23 in IR = port check showed probable small thrombus at tip of cathter, flushed/improved aspiration of the port     Hypertension  • Dc norvasc 2 5mg  • Monitor and resume if BP improves      Diabetes mellitus  • Home:  Lantus 8U qam/Lispro 4U TID  • Here:  Lantus 5U qhs/Lispro 4U TID     • Has a DEXCOM meter and a regular meter at home  • Continue DM diet and QID Accuchecks/SSI     ALEXY/CKD  • Stage III; baseline 1 2-1 4  • Creat was 2 5 on admission with a K+ level of 5 9  • Currently creat is 1 28     Anemia  • Transfused in December and January  • Hemoglobin is 7 1   • Recommend transfusion     Situational depression  • Cymbalta      Left pleural effusion  • Thoracentesis 1/12 for 300ml  • Cyto from pleural fluid was negative for malignancy; cx = NG  • stable     Hyponatremia  • Mild  • Felt likely 2/2 SIADH in setting of malignancy  • Stable    Malnutrition  · likes magic cup  · May need to adjust insulin for same  · Prefers premier protein banana flavored however we do not carry that here    Low grade temp  · Overnight 100 3  · With mild bump in wbc's  · Recommend ID input and hold on abx for now  · No other symptoms, wounds are stable as well as drains  · RUQ pain is unchanged since changing of drain  · Afebrile this am        DC planning: TBD  The above assessment and plan was reviewed and updated as determined by my evaluation of the patient on 2/26/2023      Labs:   Results from last 7 days   Lab Units 02/25/23  0552 02/24/23  0532   WBC Thousand/uL 10 23* 10 42*   HEMOGLOBIN g/dL 7 5* 7 2*   HEMATOCRIT % 23 6* 23 0*   PLATELETS Thousands/uL 328 301     Results from last 7 days   Lab Units 02/25/23  0552 02/24/23  0532   SODIUM mmol/L 132* 132*   POTASSIUM mmol/L 3 6 3 7   CHLORIDE mmol/L 103 101   CO2 mmol/L 21 20*   BUN mg/dL 16 14   CREATININE mg/dL 1 28 1 36*   CALCIUM mg/dL 7 8* 7 6*             Results from last 7 days   Lab Units 02/26/23  0623 02/25/23  2049 02/25/23  1548   POC GLUCOSE mg/dl 191* 165* 161*       Review of Scheduled Meds:  Current Facility-Administered Medications   Medication Dose Route Frequency Provider Last Rate   • acetaminophen  650 mg Oral Q4H PRN Marquita Harrell, DO     • amLODIPine  2 5 mg Oral Daily Marquita Lao, DO     • aspirin  81 mg Oral Daily Marquita Czech, DO     • atorvastatin  40 mg Oral Daily Marquita Lao, DO     • collagenase   Topical Daily Ida To, DO     • DULoxetine  30 mg Oral Daily Ida To, DO     • heparin (porcine)  5,000 Units Subcutaneous [facility] Ida To, DO     • influenza vaccine  0 5 mL Intramuscular Once Isaias Zaragoza MD     • insulin glargine  5 Units Subcutaneous HS Ida To, DO     • insulin lispro  1-6 Units Subcutaneous TID AC Ida To, DO     • insulin lispro  1-6 Units Subcutaneous HS Ida To, DO     • insulin lispro  4 Units Subcutaneous TID With Meals Ida To, DO     • melatonin  6 mg Oral HS PRN Ida To, DO     • methocarbamol  500 mg Oral BID PRN Ida To, DO     • ondansetron  4 mg Intravenous Q4H PRN Ida To, DO     • oxyCODONE  2 5 mg Oral Q4H PRN Ida To, DO     • oxyCODONE  5 mg Oral Q4H PRN Ida To, DO     • pantoprazole  40 mg Oral BID AC Ida To, DO     • simethicone  80 mg Oral 4x Daily (with meals and at bedtime) Ida To, DO     • tamsulosin  0 4 mg Oral Daily With Dinner Ida To, DO     • traZODone  50 mg Oral HS Isaias Zaragoza MD         Subjective/ HPI: Patient seen and examined  Patients overnight issues or events were reviewed with nursing or staff during rounds or morning huddle session  New or overnight issues include the following:     Pt seen in his chair  Overall feels unchanged  Had low grade temp overnight, but he states that this is not new  He has ongoing RUQ pain since they changed his GB tube, no dysuria, no respiratory symptoms and wound is stable  Will transfuse him for his hgb  Trazadone did not work insomnia will switch to atarax  ROS:   A 10 point ROS was performed; negative except as noted above         Imaging:     No orders to display       *Labs /Radiology studies Reviewed  *Medications  reviewed and reconciled as needed  *Please refer to order section for additional ordered labs studies  *Case discussed with primary attending during morning huddle case rounds    Physical Examination:  Vitals:   Vitals:    02/25/23 1257 02/25/23 2055 02/25/23 2224 02/26/23 0306   BP: 108/74 134/85  125/80   BP Location: Right arm Right arm  Right arm   Pulse: 101 (!) 108  90   Resp: 18 18  18   Temp: (!) 97 3 °F (36 3 °C) 100 3 °F (37 9 °C) 99 4 °F (37 4 °C) 98 1 °F (36 7 °C)   TempSrc: Oral Oral Oral Oral   SpO2: 100% 97%  96%   Weight: 81 5 kg (179 lb 9 6 oz)      Height: 5' 9" (1 753 m)          GEN: No apparent distress, interactive; tired this am  NEURO: Alert and oriented x3  HEENT: Pupils are equal and reactive, EOMI, mucous membranes are moist, face symmetrical  CV: S1 S2 regular, no MRG, no peripheral edema noted  RESP: Lungs are clear bilaterally, no wheezes, rales or rhonchi noted, on room air, respirations easy and non labored  GI: Flat, soft non tender, non distended; +BS x4; Ileostomy LUQ soft brown stool; choley tube w/thick light tan drainage  : Voiding without difficulty  MUSC: Moves all extremities; generalized deconditioning  SKIN: pink, warm and dry, normal turgor, midabdominal dressing in place media images noted with slough and some pustulant drainage on upper portion      The above physical exam was reviewed and updated as determined by my evaluation of the patient on 2/26/2023  Invasive Devices     Central Venous Catheter Line  Duration           Port A Cath 03/10/22 Right Chest 352 days          Drain  Duration           Ileostomy  days    Cholecystostomy Tube 1 day                   VTE Pharmacologic Prophylaxis: Heparin  Code Status: Level 1 - Full Code  Current Length of Stay: 2 day(s)      Total time spent:  30 minutes with more than 50% spent counseling/coordinating care  Counseling includes discussion with patient re: progress  and discussion with patient of his/her current medical state/information  Coordination of patient's care was performed in conjunction with primary service   Time invested included review of patient's labs, vitals, and management of their comorbidities with continued monitoring  In addition, this patient was discussed with medical team including physician and advanced extenders  The care of the patient was extensively discussed and appropriate treatment plan was formulated unique for this patient  Medical decision making for the day was made by supervising physician unless otherwise noted in their attestation statement  ** Please Note:  voice to text software may have been used in the creation of this document   Although proof errors in transcription or interpretation are a potential of such software**

## 2023-02-27 ENCOUNTER — TRANSITIONAL CARE MANAGEMENT (OUTPATIENT)
Dept: INTERNAL MEDICINE CLINIC | Facility: CLINIC | Age: 58
End: 2023-02-27

## 2023-02-27 LAB
ABO GROUP BLD BPU: NORMAL
ANION GAP SERPL CALCULATED.3IONS-SCNC: 8 MMOL/L (ref 4–13)
BACTERIA BLD CULT: NORMAL
BACTERIA BLD CULT: NORMAL
BASOPHILS # BLD AUTO: 0.07 THOUSANDS/ÂΜL (ref 0–0.1)
BASOPHILS NFR BLD AUTO: 1 % (ref 0–1)
BPU ID: NORMAL
BUN SERPL-MCNC: 16 MG/DL (ref 5–25)
CALCIUM SERPL-MCNC: 8.2 MG/DL (ref 8.3–10.1)
CHLORIDE SERPL-SCNC: 104 MMOL/L (ref 96–108)
CO2 SERPL-SCNC: 19 MMOL/L (ref 21–32)
CREAT SERPL-MCNC: 1.18 MG/DL (ref 0.6–1.3)
CROSSMATCH: NORMAL
EOSINOPHIL # BLD AUTO: 0 THOUSAND/ÂΜL (ref 0–0.61)
EOSINOPHIL NFR BLD AUTO: 0 % (ref 0–6)
ERYTHROCYTE [DISTWIDTH] IN BLOOD BY AUTOMATED COUNT: 16.5 % (ref 11.6–15.1)
GFR SERPL CREATININE-BSD FRML MDRD: 67 ML/MIN/1.73SQ M
GLUCOSE P FAST SERPL-MCNC: 121 MG/DL (ref 65–99)
GLUCOSE SERPL-MCNC: 102 MG/DL (ref 65–140)
GLUCOSE SERPL-MCNC: 121 MG/DL (ref 65–140)
GLUCOSE SERPL-MCNC: 121 MG/DL (ref 65–140)
GLUCOSE SERPL-MCNC: 146 MG/DL (ref 65–140)
GLUCOSE SERPL-MCNC: 162 MG/DL (ref 65–140)
HCT VFR BLD AUTO: 27.3 % (ref 36.5–49.3)
HGB BLD-MCNC: 8.7 G/DL (ref 12–17)
IMM GRANULOCYTES # BLD AUTO: 0.18 THOUSAND/UL (ref 0–0.2)
IMM GRANULOCYTES NFR BLD AUTO: 1 % (ref 0–2)
LYMPHOCYTES # BLD AUTO: 1.5 THOUSANDS/ÂΜL (ref 0.6–4.47)
LYMPHOCYTES NFR BLD AUTO: 11 % (ref 14–44)
MCH RBC QN AUTO: 26.5 PG (ref 26.8–34.3)
MCHC RBC AUTO-ENTMCNC: 31.9 G/DL (ref 31.4–37.4)
MCV RBC AUTO: 83 FL (ref 82–98)
MONOCYTES # BLD AUTO: 1.65 THOUSAND/ÂΜL (ref 0.17–1.22)
MONOCYTES NFR BLD AUTO: 12 % (ref 4–12)
NEUTROPHILS # BLD AUTO: 10.01 THOUSANDS/ÂΜL (ref 1.85–7.62)
NEUTS SEG NFR BLD AUTO: 75 % (ref 43–75)
NRBC BLD AUTO-RTO: 0 /100 WBCS
PLATELET # BLD AUTO: 355 THOUSANDS/UL (ref 149–390)
PMV BLD AUTO: 10.2 FL (ref 8.9–12.7)
POTASSIUM SERPL-SCNC: 4.1 MMOL/L (ref 3.5–5.3)
RBC # BLD AUTO: 3.28 MILLION/UL (ref 3.88–5.62)
SODIUM SERPL-SCNC: 131 MMOL/L (ref 135–147)
UNIT DISPENSE STATUS: NORMAL
UNIT PRODUCT CODE: NORMAL
UNIT PRODUCT VOLUME: 350 ML
UNIT RH: NORMAL
WBC # BLD AUTO: 13.41 THOUSAND/UL (ref 4.31–10.16)

## 2023-02-27 RX ORDER — SODIUM CHLORIDE 1000 MG
1 TABLET, SOLUBLE MISCELLANEOUS
Status: DISCONTINUED | OUTPATIENT
Start: 2023-02-27 | End: 2023-03-01

## 2023-02-27 RX ORDER — ENOXAPARIN SODIUM 100 MG/ML
40 INJECTION SUBCUTANEOUS
Status: COMPLETED | OUTPATIENT
Start: 2023-02-27 | End: 2023-03-12

## 2023-02-27 RX ADMIN — OXYCODONE HYDROCHLORIDE 5 MG: 5 TABLET ORAL at 22:23

## 2023-02-27 RX ADMIN — MELATONIN 6 MG: at 22:23

## 2023-02-27 RX ADMIN — SIMETHICONE 80 MG: 80 TABLET, CHEWABLE ORAL at 22:23

## 2023-02-27 RX ADMIN — SIMETHICONE 80 MG: 80 TABLET, CHEWABLE ORAL at 12:19

## 2023-02-27 RX ADMIN — INSULIN LISPRO 1 UNITS: 100 INJECTION, SOLUTION INTRAVENOUS; SUBCUTANEOUS at 12:20

## 2023-02-27 RX ADMIN — ATORVASTATIN CALCIUM 40 MG: 40 TABLET, FILM COATED ORAL at 08:31

## 2023-02-27 RX ADMIN — INSULIN LISPRO 4 UNITS: 100 INJECTION, SOLUTION INTRAVENOUS; SUBCUTANEOUS at 17:27

## 2023-02-27 RX ADMIN — INSULIN LISPRO 4 UNITS: 100 INJECTION, SOLUTION INTRAVENOUS; SUBCUTANEOUS at 12:20

## 2023-02-27 RX ADMIN — TAMSULOSIN HYDROCHLORIDE 0.4 MG: 0.4 CAPSULE ORAL at 17:27

## 2023-02-27 RX ADMIN — ENOXAPARIN SODIUM 40 MG: 40 INJECTION SUBCUTANEOUS at 13:29

## 2023-02-27 RX ADMIN — SODIUM CHLORIDE 1 G: 1 TABLET ORAL at 15:58

## 2023-02-27 RX ADMIN — SIMETHICONE 80 MG: 80 TABLET, CHEWABLE ORAL at 17:27

## 2023-02-27 RX ADMIN — COLLAGENASE SANTYL: 250 OINTMENT TOPICAL at 08:34

## 2023-02-27 RX ADMIN — SIMETHICONE 80 MG: 80 TABLET, CHEWABLE ORAL at 08:30

## 2023-02-27 RX ADMIN — PANTOPRAZOLE SODIUM 40 MG: 40 TABLET, DELAYED RELEASE ORAL at 06:08

## 2023-02-27 RX ADMIN — ASPIRIN 81 MG CHEWABLE TABLET 81 MG: 81 TABLET CHEWABLE at 08:31

## 2023-02-27 RX ADMIN — DULOXETINE HYDROCHLORIDE 30 MG: 30 CAPSULE, DELAYED RELEASE ORAL at 08:31

## 2023-02-27 RX ADMIN — HYDROXYZINE HYDROCHLORIDE 25 MG: 25 TABLET ORAL at 22:23

## 2023-02-27 RX ADMIN — OXYCODONE HYDROCHLORIDE 5 MG: 5 TABLET ORAL at 13:24

## 2023-02-27 RX ADMIN — INSULIN LISPRO 4 UNITS: 100 INJECTION, SOLUTION INTRAVENOUS; SUBCUTANEOUS at 08:33

## 2023-02-27 RX ADMIN — PANTOPRAZOLE SODIUM 40 MG: 40 TABLET, DELAYED RELEASE ORAL at 15:57

## 2023-02-27 RX ADMIN — INSULIN GLARGINE 5 UNITS: 100 INJECTION, SOLUTION SUBCUTANEOUS at 22:22

## 2023-02-27 NOTE — PLAN OF CARE
Problem: PAIN - ADULT  Goal: Verbalizes/displays adequate comfort level or baseline comfort level  Description: Interventions:  - Encourage patient to monitor pain and request assistance  - Assess pain using appropriate pain scale  - Administer analgesics based on type and severity of pain and evaluate response  - Implement non-pharmacological measures as appropriate and evaluate response  - Consider cultural and social influences on pain and pain management  - Notify physician/advanced practitioner if interventions unsuccessful or patient reports new pain  Outcome: Progressing     Problem: INFECTION - ADULT  Goal: Absence or prevention of progression during hospitalization  Description: INTERVENTIONS:  - Assess and monitor for signs and symptoms of infection  - Monitor lab/diagnostic results  - Monitor all insertion sites, i e  indwelling lines, tubes, and drains  - Monitor endotracheal if appropriate and nasal secretions for changes in amount and color  - Johnson appropriate cooling/warming therapies per order  - Administer medications as ordered  - Instruct and encourage patient and family to use good hand hygiene technique  - Identify and instruct in appropriate isolation precautions for identified infection/condition  Outcome: Progressing  Goal: Absence of fever/infection during neutropenic period  Description: INTERVENTIONS:  - Monitor WBC    Outcome: Progressing     Problem: SAFETY ADULT  Goal: Patient will remain free of falls  Description: INTERVENTIONS:  - Educate patient/family on patient safety including physical limitations  - Instruct patient to call for assistance with activity   - Consult OT/PT to assist with strengthening/mobility   - Keep Call bell within reach  - Keep bed low and locked with side rails adjusted as appropriate  - Keep care items and personal belongings within reach  - Initiate and maintain comfort rounds  - Make Fall Risk Sign visible to staff  - Offer Toileting every 2 Hours, in advance of need  - Initiate/Maintain alarm  - Obtain necessary fall risk management equipment  - Apply yellow socks and bracelet for high fall risk patients  - Consider moving patient to room near nurses station  Outcome: Progressing  Goal: Maintain or return to baseline ADL function  Description: INTERVENTIONS:  -  Assess patient's ability to carry out ADLs; assess patient's baseline for ADL function and identify physical deficits which impact ability to perform ADLs (bathing, care of mouth/teeth, toileting, grooming, dressing, etc )  - Assess/evaluate cause of self-care deficits   - Assess range of motion  - Assess patient's mobility; develop plan if impaired  - Assess patient's need for assistive devices and provide as appropriate  - Encourage maximum independence but intervene and supervise when necessary  - Involve family in performance of ADLs  - Assess for home care needs following discharge   - Consider OT consult to assist with ADL evaluation and planning for discharge  - Provide patient education as appropriate  Outcome: Progressing  Goal: Maintains/Returns to pre admission functional level  Description: INTERVENTIONS:  - Perform BMAT or MOVE assessment daily    - Set and communicate daily mobility goal to care team and patient/family/caregiver  - Collaborate with rehabilitation services on mobility goals if consulted  - Perform Range of Motion 3 times a day  - Reposition patient every 21 hours    - Dangle patient 3 times a day  - Stand patient 3 times a day  - Ambulate patient 3 times a day  - Out of bed to chair 3 times a day   - Out of bed for meals 3 times a day  - Out of bed for toileting  - Record patient progress and toleration of activity level   Outcome: Progressing     Problem: DISCHARGE PLANNING  Goal: Discharge to home or other facility with appropriate resources  Description: INTERVENTIONS:  - Identify barriers to discharge w/patient and caregiver  - Arrange for needed discharge resources and transportation as appropriate  - Identify discharge learning needs (meds, wound care, etc )  - Arrange for interpretive services to assist at discharge as needed  - Refer to Case Management Department for coordinating discharge planning if the patient needs post-hospital services based on physician/advanced practitioner order or complex needs related to functional status, cognitive ability, or social support system  Outcome: Progressing     Problem: Prexisting or High Potential for Compromised Skin Integrity  Goal: Skin integrity is maintained or improved  Description: INTERVENTIONS:  - Identify patients at risk for skin breakdown  - Assess and monitor skin integrity  - Assess and monitor nutrition and hydration status  - Monitor labs   - Assess for incontinence   - Turn and reposition patient  - Assist with mobility/ambulation  - Relieve pressure over bony prominences  - Avoid friction and shearing  - Provide appropriate hygiene as needed including keeping skin clean and dry  - Evaluate need for skin moisturizer/barrier cream  - Collaborate with interdisciplinary team   - Patient/family teaching  - Consider wound care consult   Outcome: Progressing     Problem: Nutrition/Hydration-ADULT  Goal: Nutrient/Hydration intake appropriate for improving, restoring or maintaining nutritional needs  Description: Monitor and assess patient's nutrition/hydration status for malnutrition  Collaborate with interdisciplinary team and initiate plan and interventions as ordered  Monitor patient's weight and dietary intake as ordered or per policy  Utilize nutrition screening tool and intervene as necessary  Determine patient's food preferences and provide high-protein, high-caloric foods as appropriate       INTERVENTIONS:  - Monitor oral intake, urinary output, labs, and treatment plans  - Assess nutrition and hydration status and recommend course of action  - Evaluate amount of meals eaten  - Assist patient with eating if necessary   - Allow adequate time for meals  - Recommend/ encourage appropriate diets, oral nutritional supplements, and vitamin/mineral supplements  - Order, calculate, and assess calorie counts as needed  - Recommend, monitor, and adjust tube feedings and TPN/PPN based on assessed needs  - Assess need for intravenous fluids  - Provide specific nutrition/hydration education as appropriate  - Include patient/family/caregiver in decisions related to nutrition  Outcome: Progressing     Problem: MOBILITY - ADULT  Goal: Maintain or return to baseline ADL function  Description: INTERVENTIONS:  -  Assess patient's ability to carry out ADLs; assess patient's baseline for ADL function and identify physical deficits which impact ability to perform ADLs (bathing, care of mouth/teeth, toileting, grooming, dressing, etc )  - Assess/evaluate cause of self-care deficits   - Assess range of motion  - Assess patient's mobility; develop plan if impaired  - Assess patient's need for assistive devices and provide as appropriate  - Encourage maximum independence but intervene and supervise when necessary  - Involve family in performance of ADLs  - Assess for home care needs following discharge   - Consider OT consult to assist with ADL evaluation and planning for discharge  - Provide patient education as appropriate  Outcome: Progressing  Goal: Maintains/Returns to pre admission functional level  Description: INTERVENTIONS:  - Perform BMAT or MOVE assessment daily    - Set and communicate daily mobility goal to care team and patient/family/caregiver     - Collaborate with rehabilitation services on mobility goals if consulted  - Out of bed for toileting  - Record patient progress and toleration of activity level   Outcome: Progressing

## 2023-02-27 NOTE — MALNUTRITION/BMI
This medical record reflects one or more clinical indicators suggestive of malnutrition and/or morbid obesity  Malnutrition Findings:   Adult Malnutrition type: Chronic illness  Adult Degree of Malnutrition: Other severe protein calorie malnutrition  Malnutrition Characteristics: Inadequate energy, Weight loss                  360 Statement: severe protein calorie malnutrition in setting of metastatic colon cancer to liver, possible lung, s/p resection, chemotherapy, evidenced by <75% energy intake compared to estimated needs for > 1 mo, weight loss ( current wt:  2/25/23  179 6 lbs, 3/2/22 239 lbs (25% wt loss x 12 mo), treated with diet and supplements    BMI Findings: Body mass index is 26 52 kg/m²  See Nutrition note dated 2/27/23 for additional details  Completed nutrition assessment is viewable in the nutrition documentation

## 2023-02-27 NOTE — PROGRESS NOTES
PM&R PROGRESS NOTE:  Romy Weathers 61 y o  male MRN: [id number]  Unit/Bed#: -64 Encounter: [id number]        Rehabilitation Diagnosis: Impairment of mobility, safety and Activities of Daily Living (ADLs) due to Debility:  16  Debility (Non-cardiac/Non-pulmonary)    HPI: Moose Rehman is a 61 y o  male with a history of hyperlipidemia, hypertension, GERD, ventral hernia, colon adenocarcinoma that initially presented to [facility] for an elective surgical procedure with Dr Joselyn Leiva on 11/7/22  Patient presented to hem/onc on  9/22 when CT abdomen revealed transverse colonic apple core lesion and innumerable hepatic metastases  MRI revealed multiple hepati metastasis with smaller lesions in left lobe and larger lesions on the right lobe  On 11/7, patient underwent exploratory laparotomy, segment 3 liver resection, ablation, intraoperative ultrasound of the liver  This was complicated by left upper quadrant hematoma, imaging showed suspected leak from transverse colon anastomosis  On 11/16, patient underwent exploratory lap revealing hematoma, defect in transverse colon anastomosis with extravasation of succus and a dilated cecum requiring resection  He had a right hemicolectomy, left in discontinuity and temporary abdominal closure device  On 11/17, patient underwent re-exploratory, partial descending colectomy, primary colonic anastomosis created with diverting loop ileostomy and midline wound vac placement  An intra-abdominal abscess was positive for ESBL and patient completed prolonged course of antibiotics  He was discharged to SNF on 12/3 and returned to hospital on 12/4 for increased abdominal pain  Patient monitored off of antibiotics  On 12/6 patient was noted with an increased creatinine level, received IVF bolus with improvement  Patient developed tachycardia, increased abdominal pain, and incision with yellow drainage noted  CT AP showed abdominal abscess and distended gallbladder   ID was consulted, patient was continued on IV antibiotics through 12/15  Patient went to IR for percutaneous cholecystostomy tube insertion and hepatectomy abscess drainage  Nephrology consulted and initiated sodium bicarb gtt and IV albumin  On 12/14, patient midline/left chest wall pain, below abscess drain  Cardiac work-up completed  Troponin level 57 and CXR showed progressive bilateral opacities suspicious for CHF and small left pleural effusion  Cariology consulted with chest pain appearing musculoskeletal no further work up warranted  Patient was deemed medically stable and admitted to [address] [place] [address] on 12/14/22  He was discharged from [facility] on 01/17/23  Patient present to [facility] on 02/07 with increased weakness for two weeks with 2 falls noted  No injury noted  He was found to be septic with the main source of midline abdominal wound which was with foul smelling drainage  CT showed improvement in fluid collection at the left liver segment, perisplenic, left retroperitoneum, interior to the spleen  Patient was treated with broad spectrum antibiotics and eventually placed on Ertapenem  Hospital course complicated by fevers and leukocytosis  Repeat imaging concerning for cholecystitis  Patient underwent cystostomy tube exchange and Pattock drain was removed on 02/24  The patient was evaluated by the Rehabilitation team and deemed an appropriate candidate for an inpatient acute rehabilitation program  Patient was admitted on 02/24/23         SUBJECTIVE: Patient seen face to face  No acute issues overnight  Patient in good spirits  Progressing as expected in rehabilitation  Midline abdominal wound visualized pink, healing with multiple small areas of slough  Percutaneous drainage creamy tan, ileostomy with loose stool  Patient reports tenderness at insertion site of perc radha drain  Voiding  Denies chest pain, shortness of breath, fever, chills, nausea, abdominal pain       ASSESSMENT: Stable, progressing      PLAN:  -DVT prophylaxis, patient has refused heparin since acute admission  Transitioned to Lovenox 40 mg daily  -pain: continue with current pain regimen  - insomnia: Atarax 25 mg hs effective, patient feels rested, fatigued today, monitor    - Leukocytosis 13 41, afebrile Tmax 98 8 in last 24 hours, no signs of infection, ID consulted to follow, continue to closely monitor  Rehabilitation  • Functional deficits:  Mobility, self care  • Continue current rehabilitation plan of care to maximize function  • Functional update:   o PT: mobility- supervision, transfers-min A, ambulation- mod A 35'  o OT:  ADL- bathing: min A (sponge bath), dressing supervision-incidental touching  • Estimated Discharge: anticipated 10-14 days    Pain  • Tylenol, oxycodone    DVT prophylaxis  • Lovenox    Bladder plan  • Continent    Bowel plan  • Ileostomy    Malignant neoplasm of transverse colon [facility])  Assessment & Plan  Complex history with chronological details below:    2/22: S/P diverting colostomy, liver biopsy +Chemo tx  11/7/22: Hepatic resection and colostomy reversal  11/16/22: Exploratory laparotomy with bowel resection and VAC placement   11/17/22: Right hemicolectomy, partial descending colon resection, colocolonic anastomosis with loop ileosotmy and midline VAC placement  12/20/22: Drains placed, total of 3 drains + perc alvina tube + ileostomy  1/31/23: 2 of 3 drains removed  This hospital stay had fever/leukocytosis/sepsis, started Meropenem and then to Vancomycin and Ertapenem  Wound cx again = Ecoli ESBL  LD Ertapenem 2/14, Vancomycin 2/16 2/24: Abscessogram of midline drain showed minimal persistent collection and the midline catheter was removed   Perc Alvina tube exchanged  • Patient does not wish to follow with [facility] anymore as it is too far of a drive; would like to establish with [place] [id number] oncology (already follows with Dr Sree Blanc)  • Follows with Dr Sara Hill as outpt    * Sepsis [facility])  Assessment & Plan  Noted the development of foul-smelling drainage and increasing pain from his chronic abdominal wound with leukocytosis, tachypnea, tachycardia  • Purulent drainage from abdominal wound  • Hx of colon CA w/ extensive abd surgical- adenocarcinoma of transverse colon section  • Patient has several abdominal drains in place, with CT showing improvement in the fluid collections at the left liver lobe, perisplenic area, and the left retroperitoneum inferior to the spleen  • Completed Vancomycin and Ertapenem  • S/p bedside midline wound debridement + wet dressing 2/8 with general surgery  • S/p IR tube check 2/17/2023, drains kept in place  Discussed with IR, follow up in 1 month  • Given ongoing low grade fever and worsening leukocytosis, ordered repeat blood cultures and repeat CT a/p    CT a/p showing moderate R pleural effusion, imaging concerning for cholecystitis  Discussed case with surgery, IR  IR will reposition drain today- Radha tube check showed retraction of the radha tube   Interval occlusion of the cystic duct   Dino-enteric fistula persisted  Exchange of radha tube 2/24  • Continue to monitor off of antibiotics, low threshold to re-consult ID, but stable at this time  • Debility from Sepsis in addition to 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  • Pain control, anxiety management  • PT/OT    Chronic bilateral pleural effusions  Assessment & Plan  Hx of pleural effusions with   Interval worsening of moderate right and small left pleural effusions  There is associated bibasilar atelectasis based on CTAP on 2/22    Monitor oxygen and breathing, may benefit from thoracentesis    Depression  Assessment & Plan  Continue Cymbalta  Provide supportive counseling and encouragement, easily tearful  Neuropsychology consultation for support    Cholecystitis, unspecified  Assessment & Plan  Subacute at this point, but still requiring per radha tube  • s/p percutaneous tube placement 12/8/22 asnd recent exchange on 2/24/23  • CT scan was concerning for cholecystitis --> to IR 2/24 = showed retraction of the radha tube and interval occlusion of the cystic duct   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  Radha tube was exchanged  • Monitor output and pain    Abdominal wound dehiscence  Assessment & Plan  Midline Abdominal wound with initially fould smelling drainage  Now improved per patient with red granular base and slough noted  Snatyl and wet to dry dressing was done in acute care  Consult wound care for eval and recs    Nephrolithiasis  Assessment & Plan  RUQ US revealed 7 mm nonobstructing right intrarenal calculus  No hydronephrosis noted on CT a/p  Asymptomatic at this time  • Flomax  • Monitor outpt, symptoms    Hyponatremia  Assessment & Plan  • Mild, most recently 132 on 2/24  • [place] likely 2/2 SIADH in setting of malignancy    Elevated alkaline phosphatase level  Assessment & Plan  Chronically elevated likely in setting of known hepatic metastasis  • RUQ US Decompressed gallbladder around a cholecystostomy tube, limiting evaluation  Hepatomegaly  Heterogeneous echotexture of the liver in this patient with known metastatic disease  7 mm nonobstructing right intrarenal calculus  • Monitor with am CMP    Leukocytosis  Assessment & Plan  Mildly above normal, trendiong down, however has a history of spiking and dropping  Monitor labs however need to follow clinical exam and vitals as it may spike and drop again  Monitor off antibiotics  Abscess  Assessment & Plan  Smaller abscesses on recent studies now with no active drains  At risk for further complications    Lower theshold for abdominal CT if develops fevers or leukocytosis    Acute on chronic kidney failure [facility])  Assessment & Plan  Lab Results   Component Value Date    EGFR 56 02/24/2023    EGFR 52 02/23/2023    EGFR 52 02/22/2023    CREATININE 1 36 (H) 02/24/2023    CREATININE 1 45 (H) 02/23/2023    CREATININE 1 45 (H) 02/22/2023     • POA with creatinine 2 52; baseline 1-1 3  • Suspecting possible multifactorial cause in setting of dehydration and sepsis  • Improving back into baseline levels, Continue to monitor on labs, minimize any relative hypotension  Avoid nephrotoxic agents  Anemia  Assessment & Plan  Hbg 8 7 (previous 7 2, 8 1)  Required transfusion on 2/16  Order type and screen, 1 unit pRBC (2/26)  Monitor CBC    Mixed hyperlipidemia  Assessment & Plan  Continue statin    Benign essential hypertension  Assessment & Plan  Monitor pressures in therapy    Type 2 diabetes mellitus without complication, with long-term current use of insulin [facility])  Assessment & Plan  Lab Results   Component Value Date    HGBA1C 8 0 (H) 09/26/2022       Recent Labs     02/24/23  0839 02/24/23  1137 02/24/23  1626 02/24/23  2040   POCGLU 106 105 152* 120     • Home:  Lantus 8U qam/Lispro 4U TID  • Here:  Lantus 5U qhs/Lispro 4U TID  • Has a DEXCOM meter and a regular meter at home  • Continue DM diet and QID Accuchecks/SSI    Appreciate IM consultants medical co-management  Labs, medications, and imaging personally reviewed  ROS:  A ten point review of systems was completed on 02/27/23 and pertinent positives are listed in subjective section  All other systems reviewed were negative  Review of Systems     OBJECTIVE:   /77   Pulse 83   Temp 98 °F (36 7 °C) (Oral)   Resp 18   Ht 5' 9" (1 753 m)   Wt 81 5 kg (179 lb 9 6 oz)   SpO2 96%   BMI 26 52 kg/m²     Physical Exam  Constitutional:       Appearance: Normal appearance  HENT:      Head: Normocephalic and atraumatic  Mouth/Throat:      Mouth: Mucous membranes are moist    Cardiovascular:      Rate and Rhythm: Normal rate and regular rhythm  Pulses: Normal pulses  Heart sounds: Normal heart sounds  Pulmonary:      Effort: Pulmonary effort is normal       Breath sounds: Normal breath sounds        Comments: diminished  Abdominal:      General: Bowel sounds are normal       Palpations: Abdomen is soft  Tenderness: There is abdominal tenderness  Comments: + ileostomy, per radah drain   Musculoskeletal:         General: Normal range of motion  Cervical back: Normal range of motion  Skin:     General: Skin is warm and dry  Capillary Refill: Capillary refill takes less than 2 seconds  Comments: Midline abdominal wound pink, scattered small areas slough   Neurological:      Mental Status: He is alert and oriented to person, place, and time  Motor: Weakness present     Psychiatric:         Mood and Affect: Mood normal          Judgment: Judgment normal         Lab Results   Component Value Date    WBC 13 41 (H) 02/27/2023    HGB 8 7 (L) 02/27/2023    HCT 27 3 (L) 02/27/2023    MCV 83 02/27/2023     02/27/2023     Lab Results   Component Value Date    SODIUM 131 (L) 02/27/2023    K 4 1 02/27/2023     02/27/2023    CO2 19 (L) 02/27/2023    BUN 16 02/27/2023    CREATININE 1 18 02/27/2023    GLUC 121 02/27/2023    CALCIUM 8 2 (L) 02/27/2023     Lab Results   Component Value Date    INR 1 32 (H) 02/07/2023    INR 1 12 12/19/2022    INR 1 10 11/12/2022    PROTIME 16 1 (H) 02/07/2023    PROTIME 14 7 (H) 12/19/2022    PROTIME 14 4 11/12/2022       Current Facility-Administered Medications:   •  acetaminophen (TYLENOL) tablet 650 mg, 650 mg, Oral, Q4H PRN, Clevester Side, DO, 650 mg at 02/25/23 2115  •  aspirin chewable tablet 81 mg, 81 mg, Oral, Daily, Clevester Side, DO, 81 mg at 02/27/23 3117  •  atorvastatin (LIPITOR) tablet 40 mg, 40 mg, Oral, Daily, Clevester Side, DO, 40 mg at 02/27/23 0831  •  collagenase (SANTYL) ointment, , Topical, Daily, Clevester Side, DO, Given at 02/27/23 0563  •  DULoxetine (CYMBALTA) delayed release capsule 30 mg, 30 mg, Oral, Daily, Clevester Side, DO, 30 mg at 02/27/23 0831  •  enoxaparin (LOVENOX) subcutaneous injection 40 mg, 40 mg, Subcutaneous, Q24H [address], GAURANG Keith, 40 mg at 02/27/23 1329  •  hydrOXYzine HCL (ATARAX) tablet 25 mg, 25 mg, Oral, HS, Haley Kc, CRNP, 25 mg at 02/26/23 2144  •  insulin glargine (LANTUS) subcutaneous injection 5 Units 0 05 mL, 5 Units, Subcutaneous, HS, Marquita Eritrean, DO, 5 Units at 02/26/23 2133  •  insulin lispro (HumaLOG) 100 units/mL subcutaneous injection 1-6 Units, 1-6 Units, Subcutaneous, TID AC, 1 Units at 02/27/23 1220 **AND** Fingerstick Glucose (POCT), , , 4x Daily AC and at bedtime, Marquita Eritrean, DO  •  insulin lispro (HumaLOG) 100 units/mL subcutaneous injection 1-6 Units, 1-6 Units, Subcutaneous, HS, Marquita Eritrean, DO, 1 Units at 02/26/23 2153  •  insulin lispro (HumaLOG) 100 units/mL subcutaneous injection 4 Units, 4 Units, Subcutaneous, TID With Meals, Marquita Eritrean, DO, 4 Units at 02/27/23 1220  •  melatonin tablet 6 mg, 6 mg, Oral, HS PRN, Marquita Eritrean, DO, 6 mg at 02/26/23 2133  •  methocarbamol (ROBAXIN) tablet 500 mg, 500 mg, Oral, BID PRN, Marquita Eritrean, DO, 500 mg at 02/24/23 2120  •  ondansetron (ZOFRAN) injection 4 mg, 4 mg, Intravenous, Q4H PRN, Marquita Eritrean, DO  •  oxyCODONE (ROXICODONE) IR tablet 2 5 mg, 2 5 mg, Oral, Q4H PRN, Marquita Eritrean, DO  •  oxyCODONE (ROXICODONE) IR tablet 5 mg, 5 mg, Oral, Q4H PRN, Marquita Eritrean, DO, 5 mg at 02/27/23 1324  •  pantoprazole (PROTONIX) EC tablet 40 mg, 40 mg, Oral, BID AC, Marquita Eritrean, DO, 40 mg at 02/27/23 0608  •  simethicone (MYLICON) chewable tablet 80 mg, 80 mg, Oral, 4x Daily (with meals and at bedtime), Marquita Harrell, DO, 80 mg at 02/27/23 1219  •  sodium chloride tablet 1 g, 1 g, Oral, Daily Before Breakfast, GAURANG Desai  •  tamsulosin [facility]) capsule 0 4 mg, 0 4 mg, Oral, Daily With Precilla Vitale, DO, 0 4 mg at 02/26/23 1656    Past Medical History:   Diagnosis Date   • Abdominal pain 03/12/2022   • Acute renal failure ([facility] )     [id number] resolved   • Acute respiratory failure with hypoxia ([facility] ) 11/12/2022   • Bacteremia 11/12/2022   • Cancer [facility])    • Diabetes mellitus ([place] [address] )    • Enteritis 08/23/2016   • Flash pulmonary edema ([place] [address] ) 11/12/2022   • Gastroparesis due to DM ([place] [address] ) 08/23/2016   • GERD (gastroesophageal reflux disease)    • Hernia, ventral 08/04/2016   • Hyperlipidemia    • Hypertension    • Morbid obesity ([place] [address] ) 04/17/2018   • Postoperative visit 03/02/2022   • SIRS (systemic inflammatory response syndrome) ([place] [address] ) 03/12/2022   • Snoring    • Stage 3a chronic kidney disease ([place] [address] ) 02/19/2022       Patient Active Problem List    Diagnosis Date Noted   • Malignant neoplasm of transverse colon ([place] [address] ) 03/01/2022   • Sepsis ([place] [address] ) 12/17/2022   • 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 liver ([place] [address] ) 03/02/2022   • Abdominal wound dehiscence 02/24/2023   • Cholecystitis, unspecified 02/24/2023   • Depression 02/24/2023   • Chronic bilateral pleural effusions 02/24/2023   • Nephrolithiasis 02/09/2023   • Abnormal CT scan 02/07/2023   • Hyponatremia 02/07/2023   • Dehiscence of incision 12/30/2022   • Elevated alkaline phosphatase level 12/30/2022   • Leukocytosis 12/29/2022   • Abscess 12/27/2022   • Acute pain 12/27/2022   • Severe protein-calorie malnutrition ([place] [address] ) 12/14/2022   • Acute on chronic kidney failure ([place] [address] ) 12/14/2022   • MR (mitral regurgitation) 11/12/2022   • ESBL (extended spectrum beta-lactamase) producing bacteria infection 11/12/2022   • Encephalopathy 11/09/2022   • Cervical radiculopathy 10/26/2022   • Other fatigue 06/15/2022   • Colostomy prolapse ([place] [address] ) 05/27/2022   • Colon cancer metastasized to liver ([place] [address] ) 03/12/2022   • Iron deficiency anemia, unspecified 03/01/2022   • Thrombocytosis 02/21/2022   • Hypokalemia 02/19/2022   • Transaminitis 02/19/2022   • Type 2 diabetes mellitus with hyperlipidemia ([facility] ) 02/18/2022   • Anemia 02/18/2022   • Left ureteral calculus 01/30/2020   • Incarcerated umbilical hernia 38/44/7791   • Testicular hypogonadism 06/19/2017   • Low testosterone 05/30/2017   • Type 2 diabetes mellitus without complication, with long-term current use of insulin ([facility] ) 08/23/2016   • Benign essential hypertension 08/23/2016   • Mixed hyperlipidemia 08/23/2016   • Erectile dysfunction 07/11/2016   • Obesity (BMI 30-39 9) 07/11/2016          GAURANG Bassett  Physical Medicine and Chris Luu    Total visit time: 35 minutes, with more than 50% spent counseling/coordinating care  Counseling includes discussion with patient re: progress in therapies, functional issues observed by therapy staff, and discussion with patient regarding their current medical state and wellbeing  Coordination of patient's care was performed in conjunction with Internal Medicine service to monitor patient's labs, vitals, and management of their comorbidities

## 2023-02-27 NOTE — PROGRESS NOTES
PM&R Coverage Progress Note:    Rehabilitation Diagnosis: Debility related metastatic colon adenocarcinoma and recurrent sepsis related to intraabdominal fluid collection near liver, cholecystitis    ASSESSMENT: Stable      PLAN:    Rehabilitation  • Continue current rehabilitation plan of care to maximize function  • Fatigued this PM - may need 900 min over 7 day program if continues  Medical issues  • Anemia: H&H reviewed  Hemoglobin 7 1  Patient is feeling fatigued post therapies  Agreeable to blood transfusion x 1 unit  Consent obtained today  1 unit PRBC today  CBC in AM    • Low grade temp 100 3 and leukocytosis 11 85K  May be related to his cancer itself  No other acute source, however did receive FLU VACCINE 2/25/23  Will consult ID to follow in AM while in [facility]  • Insomnia: Patient did NOT sleep on Trazodone  Discontinued  Atarax 25 mg QHS trialing tonight  Continue PRN Melatonin  • Mood: stable  Much improved anxiety  Very happy to be at [facility]  • Sepsis: resolved  Was treated at [facility] with Meropenum, then eventually Vanco/Ertapenum  • Metastatic colon cancer to liver/lung  Outpatient follow-up with Oncology - has not yet been able to get chemo  • Incisional dehiscence: stable, continue Santyl - may need twice a day changes if looking more wet  • Subacute cholecystitis: Perc Alvina tube in place draining stable - recently exchanged on 2/24/23 due to retraction  • Ileostomy: Stable   • Hypotension: +Orthostasis  Compression stocking ordered  Salty small snack prior to therapies  • Continue current medical plan of care  Appreciate IM consultants co-management  SUBJECTIVE: Patient seen face to face  Tired, did a lot this AM in therapies  Didn't sleep on Trazodone  Willing to try Atarax  Had a low grade temp  Overnight 100 3, but asymptomatic and no further fevers today  Patient denies any change in symptoms, but does feel overall tired    Agreeable to blood transfusion x 1 unit today after reviewing H&H and his fatigue  Consent obtained  ROS:  A ten point review of systems was completed on 02/26/23 and pertinent positives are listed in subjective section  All other systems reviewed were negative  OBJECTIVE:   /73   Pulse 77   Temp (!) 97 4 °F (36 3 °C) (Oral)   Resp 16   Ht 5' 9" (1 753 m)   Wt 81 5 kg (179 lb 9 6 oz)   SpO2 97%   BMI 26 52 kg/m²     Physical Exam  Vitals and nursing note reviewed  Constitutional:       General: He is not in acute distress  Comments: fatigued   HENT:      Head: Normocephalic and atraumatic  Nose: Nose normal       Mouth/Throat:      Mouth: Mucous membranes are moist    Eyes:      Conjunctiva/sclera: Conjunctivae normal    Cardiovascular:      Rate and Rhythm: Normal rate and regular rhythm  Pulses: Normal pulses  Pulmonary:      Effort: Pulmonary effort is normal       Breath sounds: Normal breath sounds  No wheezing or rales  Abdominal:      General: Bowel sounds are normal  There is no distension  Palpations: Abdomen is soft  Tenderness: There is no abdominal tenderness  Musculoskeletal:         General: No swelling  Cervical back: Neck supple  Skin:     General: Skin is warm  Coloration: Skin is jaundiced  Comments: +ostomy  +Perc Alvina tube  +abd incision    Neurological:      Mental Status: He is alert and oriented to person, place, and time  Motor: Weakness present     Psychiatric:         Mood and Affect: Mood normal             Personally reviewed on 02/26/23:   Lab Results   Component Value Date    WBC 11 85 (H) 02/26/2023    HGB 7 1 (L) 02/26/2023    HCT 23 1 (L) 02/26/2023    MCV 84 02/26/2023     02/26/2023     Lab Results   Component Value Date    SODIUM 132 (L) 02/25/2023    K 3 6 02/25/2023     02/25/2023    CO2 21 02/25/2023    BUN 16 02/25/2023    CREATININE 1 28 02/25/2023    GLUC 114 02/25/2023    CALCIUM 7 8 (L) 02/25/2023 Lab Results   Component Value Date    INR 1 32 (H) 02/07/2023    INR 1 12 12/19/2022    INR 1 10 11/12/2022    PROTIME 16 1 (H) 02/07/2023    PROTIME 14 7 (H) 12/19/2022    PROTIME 14 4 11/12/2022           Current Facility-Administered Medications:   •  acetaminophen (TYLENOL) tablet 650 mg, 650 mg, Oral, Q4H PRN, Delayne Knifley, DO, 650 mg at 02/25/23 2115  •  amLODIPine (NORVASC) tablet 2 5 mg, 2 5 mg, Oral, Daily, Delayne Knifley, DO, 2 5 mg at 02/25/23 4085  •  aspirin chewable tablet 81 mg, 81 mg, Oral, Daily, Delayne Knifley, DO, 81 mg at 02/26/23 5349  •  atorvastatin (LIPITOR) tablet 40 mg, 40 mg, Oral, Daily, Delayne Knifley, DO, 40 mg at 02/26/23 7611  •  collagenase (SANTYL) ointment, , Topical, Daily, Delayne Knifley, DO, 1 application at 50/89/96 1253  •  DULoxetine (CYMBALTA) delayed release capsule 30 mg, 30 mg, Oral, Daily, Delayne Knifley, DO, 30 mg at 02/26/23 6559  •  heparin (porcine) subcutaneous injection 5,000 Units, 5,000 Units, Subcutaneous, Q8H [facility], Delayne Knifley, DO  •  hydrOXYzine HCL (ATARAX) tablet 25 mg, 25 mg, Oral, HS, Haley Bond, CRNP  •  insulin glargine (LANTUS) subcutaneous injection 5 Units 0 05 mL, 5 Units, Subcutaneous, HS, Delayne Knifley, DO, 5 Units at 02/25/23 2113  •  insulin lispro (HumaLOG) 100 units/mL subcutaneous injection 1-6 Units, 1-6 Units, Subcutaneous, TID AC, 1 Units at 02/26/23 1656 **AND** Fingerstick Glucose (POCT), , , 4x Daily AC and at bedtime, Delayne Knifley, DO  •  insulin lispro (HumaLOG) 100 units/mL subcutaneous injection 1-6 Units, 1-6 Units, Subcutaneous, HS, Delayne Knifley, DO, 1 Units at 02/25/23 2114  •  insulin lispro (HumaLOG) 100 units/mL subcutaneous injection 4 Units, 4 Units, Subcutaneous, TID With Meals, Vita Gruber DO, 4 Units at 02/26/23 1656  •  melatonin tablet 6 mg, 6 mg, Oral, HS PRN, Vita Gruber DO, 6 mg at 02/25/23 2229  •  methocarbamol (ROBAXIN) tablet 500 mg, 500 mg, Oral, BID PRN, Vita Gruber DO, 500 mg at 02/24/23 2120  • ondansetron (ZOFRAN) injection 4 mg, 4 mg, Intravenous, Q4H PRN, [facility], DO  •  oxyCODONE (ROXICODONE) IR tablet 2 5 mg, 2 5 mg, Oral, Q4H PRN, [facility], DO  •  oxyCODONE (ROXICODONE) IR tablet 5 mg, 5 mg, Oral, Q4H PRN, [facility], DO, 5 mg at 02/25/23 2116  •  pantoprazole (PROTONIX) EC tablet 40 mg, 40 mg, Oral, BID AC, [facility], DO, 40 mg at 02/26/23 1656  •  simethicone (MYLICON) chewable tablet 80 mg, 80 mg, Oral, 4x Daily (with meals and at bedtime), [facility], DO, 80 mg at 02/26/23 1656  •  tamsulosin (FLOMAX) capsule 0 4 mg, 0 4 mg, Oral, Daily With Adolm Bi, DO, 0 4 mg at 02/26/23 1656    Past Medical History:   Diagnosis Date   • Abdominal pain 03/12/2022   • Acute renal failure ([place] [address] )     [id number] resolved   • Acute respiratory failure with hypoxia ([place] [address] ) 11/12/2022   • Bacteremia 11/12/2022   • Cancer ([place] [address] )    • Diabetes mellitus ([place] [address] )    • Enteritis 08/23/2016   • Flash pulmonary edema ([place] [address] ) 11/12/2022   • Gastroparesis due to DM ([place] [address] ) 08/23/2016   • GERD (gastroesophageal reflux disease)    • Hernia, ventral 08/04/2016   • Hyperlipidemia    • Hypertension    • Morbid obesity ([place] [address] ) 04/17/2018   • Postoperative visit 03/02/2022   • SIRS (systemic inflammatory response syndrome) ([place] [address] ) 03/12/2022   • Snoring    • Stage 3a chronic kidney disease ([place] [address] ) 02/19/2022       Patient Active Problem List    Diagnosis Date Noted   • Malignant neoplasm of transverse colon ([place] [address] ) 03/01/2022   • 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 liver ([place] [address] ) 03/02/2022   • Abscess 12/27/2022   • Sepsis ([place] [address] ) 12/17/2022   • Abdominal wound dehiscence 02/24/2023   • Cholecystitis, unspecified 02/24/2023   • Depression 02/24/2023   • Chronic bilateral pleural effusions 02/24/2023   • Nephrolithiasis 02/09/2023   • Abnormal CT scan 02/07/2023   • Hyponatremia 02/07/2023   • Dehiscence of incision 12/30/2022   • Elevated alkaline phosphatase level 12/30/2022   • Leukocytosis 12/29/2022   • Acute pain 12/27/2022   • Severe protein-calorie malnutrition ([facility] ) 12/14/2022   • Acute on chronic kidney failure ([place] [address] ) 12/14/2022   • MR (mitral regurgitation) 11/12/2022   • ESBL (extended spectrum beta-lactamase) producing bacteria infection 11/12/2022   • Encephalopathy 11/09/2022   • Cervical radiculopathy 10/26/2022   • Other fatigue 06/15/2022   • Colostomy prolapse ([place] [address] ) 05/27/2022   • Colon cancer metastasized to liver ([place] [address] ) 03/12/2022   • Iron deficiency anemia, unspecified 03/01/2022   • Thrombocytosis 02/21/2022   • Hypokalemia 02/19/2022   • Transaminitis 02/19/2022   • Type 2 diabetes mellitus with hyperlipidemia ([place] [address] ) 02/18/2022   • Anemia 02/18/2022   • Left ureteral calculus 01/30/2020   • Incarcerated umbilical hernia 89/60/6612   • Testicular hypogonadism 06/19/2017   • Low testosterone 05/30/2017   • Type 2 diabetes mellitus without complication, with long-term current use of insulin ([place] [address] ) 08/23/2016   • Benign essential hypertension 08/23/2016   • Mixed hyperlipidemia 08/23/2016   • Erectile dysfunction 07/11/2016   • Obesity (BMI 30-39 9) 07/11/2016          Aida Luo MD  PM&R      I have spent a total time of 35 minutes on 02/26/23 in caring for this patient including Impressions, Counseling / Coordination of care, Documenting in the medical record, Reviewing / ordering tests, medicine, procedures  , Obtaining or reviewing history  , Communicating with other healthcare professionals  and consent for blood transfusion obtained  ** Please Note:  voice to text software may have been used in the creation of this document   Although proof errors in transcription or interpretation are a potential of such software**

## 2023-02-27 NOTE — PROGRESS NOTES
02/27/23 1000   Pain Assessment   Pain Assessment Tool 0-10   Pain Score 5   Pain Location/Orientation Location: Abdomen; Location: Generalized  (reported increased pain at drain site)   Restrictions/Precautions   Precautions Fall Risk;Contact/isolation;Multiple lines;Pain;Supervision on toilet/commode   Weight Bearing Restrictions No   ROM Restrictions No   Braces or Orthoses   (TEDs)   Lifestyle   Autonomy "I just want to get the drain out and the ileostomy reversed and then I will be much happier"   Eating   Type of Assistance Needed Set-up / clean-up   Physical Assistance Level No physical assistance   Comment setup while seated OOB in recliner chair post session   Eating CARE Score 5   Oral Hygiene   Type of Assistance Needed Supervision   Physical Assistance Level No physical assistance   Comment downgraded task due to fatigue; performed while seated in WC at sink   Oral Hygiene CARE Score 4   Grooming   Able To Comb/Brush Hair   Limitation Noted In Strength;Timeliness   Findings seated in  at sink; limited tolerance to functional standing on this date 2* fatigue levels  Required prolonged rest breaks w/ lite activity   Shower/Bathe Self   Type of Assistance Needed Physical assistance   Physical Assistance Level 25% or less   Comment completed sponge bath while seated and standing at sink  Limited tolerance to functional standing on this date 2* fatigue  Plan is to continue w/ SBing at d/c due to ileostomy, drain, and overall endurance  Min A in stance for steadying when performing james/rear bathing  Performed all remaining aspects while seated in WC  Able to wash LE's via x-leg method    Required increased time for bathing routine; prolonged rest breaks and pacing required to manage fatigue   Shower/Bathe Self CARE Score 3   Bathing   Assessed Bath Style Sponge Bath   Anticipated D/C Bath Style Sponge Bath   Able to Wash/Rinse/Dry (body part) Left Arm;Right Arm;L Upper Leg;R Upper Leg;R Lower Leg/Foot;L Lower Leg/Foot;Chest;Abdomen;Buttocks; Perineal Area  (partial abdomen 2* bandages/drains/multiple lines)   Limitations Noted in Endurance;Strength;Timeliness   Positioning Standing;Seated   Tub/Shower Transfer   Reason Not Assessed Sponge Bath  (SB at baseline and will continue to do so upon d/c)   Upper Body Dressing   Type of Assistance Needed Incidental touching   Physical Assistance Level No physical assistance   Comment able to retrieve clothing and transport via RW  Seated to don [place] shirt; required slight assist to manage down in back  RAMON provided A w/ drain management   Upper Body Dressing CARE Score 4   Lower Body Dressing   Type of Assistance Needed Incidental touching   Physical Assistance Level No physical assistance   Comment Able to stand and doff pants; completed remainder of doffing while seated w/ increased time  Able to thread BL LE's while seated  Required CGA in stance for CM  Able to tolerate brief standing trials on this date due to fatigue   Lower Body Dressing CARE Score 4   Putting On/Taking Off Footwear   Type of Assistance Needed Physical assistance   Physical Assistance Level Total assistance   Comment seated in WC in bathroom to doff/don slipper socks via x-leg method when provided w/ increased time    TA to don TEDs   Putting On/Taking Off Footwear CARE Score 1   Lying to Sitting on Side of Bed   Type of Assistance Needed Supervision   Physical Assistance Level No physical assistance   Comment HOB flat w/ use of bedrails; required prolonged seated rest break to manage fatigue prior to functional transfer training   Lying to Sitting on Side of Bed CARE Score 4   Sit to Stand   Type of Assistance Needed Physical assistance   Physical Assistance Level 25% or less   Comment Min A for initial stand from bed to RW; progressed to CGA for remainder of sit <> stand transfers   Sit to Stand CARE Score 3   Bed-Chair Transfer   Type of Assistance Needed Physical assistance Physical Assistance Level 25% or less   Comment Min/CGA for short distance functional mob  using RW for energy conservation   Chair/Bed-to-Chair Transfer CARE Score [address] (S)  Pt stated "I don't use the bathroom  I haven't used the toilet in months  I just sit EOB and use the urinal"  OT to follow up to assess independence w/ baseline technique   Reason if not Attempted Refused to perform   [address] Score 7   Toilet Transfer   Reason if not Attempted Refused to perform   Toilet Transfer CARE Score 7   Cognition   Overall Cognitive Status [facility]   Arousal/Participation Alert; Cooperative   Attention Within functional limits   Orientation Level Oriented X4   Memory Within functional limits   Following Commands Follows all commands and directions without difficulty   Comments remains pleasant, motivated, and effortful despite fatigue  Pt brightens when discussing ashish and family; otherwise mood can appear blunted  Speaks of future goals to continue to participate in some aspect w/ passion of [place]  Energy appears low overall   Activity Tolerance   Activity Tolerance Patient limited by fatigue   Other Comments   Assessment BP assessed while seated: 106/75  Standin/56  Pt slightly symptomatic stating "I just feel weak"  Communicated readings to Dr Raymond bronson post assessment   Assessment   Treatment Assessment Pt participated in skilled OT session w/ focus on bed mob , ADL re-training, functional transfer training, functional mob , endurance training, standing tolerance/balance, task modification, and overall activity tolerance  Details regarding functional performance noted above  Pt limited by fatigue on this date; however, he remains effortful and cooperative  Requires increased time to complete ADL routine to allow for pacing and functional rest breaks to manage severity of fatigue    Pt also slightly symptomatic w/ orthostatics (documented above)  Plan to continue donning ALVARO stockings w/ functional transfer training/OOB activity  OT to continue w/ established POC at this time  Will focus on endurance training, standing tolerance, balance training, continued education regarding pacing and task modification, ECT, fall prevention education, coordinating breath with activity, and heatlhy coping education  Pt is receptive w/ current POC  Will continue to benefit from skilled servicves in this setting w/ focus on above mentioned deficits to increase safety and maximize functional independence w/ functional transfers and ADL performance to ease burden of care  Prognosis Good   Problem List Decreased strength;Decreased range of motion;Decreased endurance; Impaired balance;Decreased mobility;Pain   Plan   Treatment/Interventions ADL retraining;Functional transfer training; Therapeutic exercise; Endurance training;Equipment eval/education;Patient/family training;Bed mobility; Compensatory technique education   Progress Progressing toward goals   Recommendation   OT Discharge Recommendation   (TBD pending progress)   OT Therapy Minutes   OT Time In 1000   OT Time Out 1130   OT Total Time (minutes) 90   OT Mode of treatment - Individual (minutes) 90   OT Mode of treatment - Concurrent (minutes) 0   OT Mode of treatment - Group (minutes) 0   OT Mode of treatment - Co-treat (minutes) 0   OT Mode of Treatment - Total time(minutes) 90 minutes   OT Cumulative Minutes 180   Therapy Time missed   Time missed?  No

## 2023-02-27 NOTE — CONSULTS
Consultation - Surgical Oncology  Zaria Hartmann 61 y o  male MRN: [id number]  Unit/Bed#: -96 Encounter: [id number]    Assessment/Plan   Assessment:  61 y o  male with a past medical history of DM, CKD, HTN, HLD, and metastatic colon cancer status post exploratory laparotomy, hepatic segment 3 resection, transverse colectomy, and reversal of loop colostomy on 66/2 complicated by anastomotic leak requiring reexploration, right hemicolectomy on 11/16 and subsequent primary ileocolic anastomosis with diverting loop ileostomy on 11/17/2022, s/p multiple IR drain placements and cholecystostomy tube placement who presents to [facility] for rehab after recent hospitalization for sepsis and deconditioning  Surgical oncology consulted for evaluation in light of mild leukocytosis and intermittent low-grade fevers  Percutaneous cholecystostomy tube: 25cc, light brown chalky output    2/22 CTAP:  Interval worsening of moderate right and small left pleural effusions  Interval progression/worsening of heterogeneous low-density masses of the liver  Liver drain and a cystostomy tubes are stable  The gallbladder demonstrates irregular wall thickening/pericholecystic edema consistent with cholecystitis  2/24 IR drain check and cholecystostomy tube exchange: Midline epigastric catheter removed  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 on initial tube checks several months ago, and only was identified recently  This likely relates to chronic inflammation within the right upper quadrant      Plan:  -Afebrile, mild leukocytosis of 13, mild abdominal pain only near cholecystostomy drain site, denies any other abdominal pain, soft, nondistended, positive ostomy function, midline incision well-healing with well granulated tissue at the base  -DM Diet and nutritional supplementation  -No overt evidence or signs of intra-abdominal infection/source  -No acute surgical intervention needs  -Continue to monitor cholecystostomy drain output, please record output daily  -Continue current plan per infectious disease and primary team  -Continue cholecystostomy tube care and drain flushes  -Discussed with Dr Marybeth Couch, will be available as needed    History of Present Illness   HPI:  Steffanie Russo is a 61 y o  male with a past medical history of DM, CKD, HTN, HLD, and metastatic colon cancer status post exploratory laparotomy, hepatic segment 3 resection, transverse colectomy, and reversal of loop colostomy on 95/1 complicated by anastomotic leak requiring reexploration, right hemicolectomy on 11/16 and subsequent primary ileocolic anastomosis with diverting loop ileostomy on 11/17/2022, s/p multiple IR drain placements and cholecystostomy tube placement who presents to [facility] for rehab after recent hospitalization for sepsis and deconditioning  Surgical oncology consulted for evaluation in light of mild leukocytosis and intermittent low-grade fevers  Inpatient Consult to Surgical Oncology  Consult performed by: Manuel Broussard PA-C  Consult ordered by: Arabella Borden MD        Review of Systems   Constitutional: Negative for activity change, appetite change, chills and fever  HENT: Negative for congestion, postnasal drip, rhinorrhea, sinus pain and sore throat  Eyes: Negative for pain, discharge and redness  Respiratory: Negative for cough, shortness of breath and wheezing  Cardiovascular: Negative for chest pain and palpitations  Gastrointestinal: Positive for abdominal pain (Near cholecystostomy tube)  Negative for abdominal distention, constipation, diarrhea, nausea and vomiting  Genitourinary: Negative for difficulty urinating, dysuria, flank pain, frequency, hematuria and urgency  Musculoskeletal: Negative for arthralgias, back pain, myalgias and neck pain  Skin: Positive for wound (Well-healing midline incision wound)  Negative for color change and rash  Neurological: Positive for weakness   Negative for dizziness, light-headedness, numbness and headaches  Psychiatric/Behavioral: Negative for agitation and behavioral problems  Historical Information   Past Medical History:   Diagnosis Date   • Abdominal pain 03/12/2022   • Acute renal failure ([place] [address] )     [id number] resolved   • Acute respiratory failure with hypoxia ([place] [address] ) 11/12/2022   • Bacteremia 11/12/2022   • Cancer ([place] [address] )    • Diabetes mellitus ([place] [address] )    • Enteritis 08/23/2016   • Flash pulmonary edema ([place] [address] ) 11/12/2022   • Gastroparesis due to DM ([place] [address] ) 08/23/2016   • GERD (gastroesophageal reflux disease)    • Hernia, ventral 08/04/2016   • Hyperlipidemia    • Hypertension    • Morbid obesity ([place] [address] ) 04/17/2018   • Postoperative visit 03/02/2022   • SIRS (systemic inflammatory response syndrome) ([place] [address] ) 03/12/2022   • Snoring    • Stage 3a chronic kidney disease ([place] [address] ) 02/19/2022     Past Surgical History:   Procedure Laterality Date   • COLONOSCOPY     • COLOSTOMY N/A 02/20/2022    Procedure: COLOSTOMY LOOP, diverting;  Surgeon: Erika Wu MD;  [facility]: [facility];  Service: General   • ESOPHAGOGASTRODUODENOSCOPY N/A 08/24/2016    Procedure: ESOPHAGOGASTRODUODENOSCOPY (EGD); Surgeon: Keven Platt MD;  [facility]: [facility];   Service:    • EXPLORATORY LAPAROTOMY W/ BOWEL RESECTION N/A 11/16/2022    Procedure: LAPAROTOMY EXPLORATORY W/ BOWEL RESECTION- VAC PLACEMENT;  Surgeon: Chaz Lomeli MD;  [facility];  Service: Surgical Oncology   • EXPLORATORY LAPAROTOMY W/ BOWEL RESECTION N/A 11/17/2022    Procedure: LAPAROTOMY EXPLORATORY W/ BOWEL RESECTION;  Surgeon: Chaz Lomeli MD;  [facility];  Service: Surgical Oncology   • ILEOSTOMY N/A 11/17/2022    Procedure: Jesse Jeffers;  Surgeon: Chaz Lomeli MD;  [facility];  Service: Surgical Oncology   • ILEOSTOMY CLOSURE N/A 11/7/2022    Procedure: REVERSAL COLOSTOMY;  Surgeon: Giorgi Eisenberg MD;  [facility];  Service: Surgical Oncology   • IR CHOLECYSTOSTOMY TUBE CHECK AND/OR REMOVAL  2/24/2023   • IR CHOLECYSTOSTOMY TUBE CHECK/CHANGE/REPOSITION/REINSERTION/UPSIZE  12/12/2022   • IR CHOLECYSTOSTOMY TUBE PLACEMENT  12/8/2022   • IR DRAINAGE TUBE CHECK AND/OR REMOVAL  1/3/2023   • IR DRAINAGE TUBE CHECK/CHANGE/REPOSITION/REINSERTION/UPSIZE  1/12/2023   • IR DRAINAGE TUBE CHECK/CHANGE/REPOSITION/REINSERTION/UPSIZE  1/31/2023   • IR DRAINAGE TUBE CHECK/CHANGE/REPOSITION/REINSERTION/UPSIZE  2/10/2023   • IR DRAINAGE TUBE CHECK/CHANGE/REPOSITION/REINSERTION/UPSIZE  2/16/2023   • IR DRAINAGE TUBE CHECK/CHANGE/REPOSITION/REINSERTION/UPSIZE  2/24/2023   • IR DRAINAGE TUBE PLACEMENT  12/8/2022   • IR DRAINAGE TUBE PLACEMENT  12/20/2022   • IR PORT CHECK  2/24/2023   • IR PORT PLACEMENT  03/10/2022   • IR THORACENTESIS  1/12/2023   • KIDNEY STONE SURGERY     • LIVER BIOPSY LAPAROSCOPIC N/A 02/20/2022    Procedure: DIAGNOSTIC LAPAROSCOPIC LIVER BIOPSY, DIVERTING LOOP COLOSTOMY ;  Surgeon: Erika Wu MD;  [facility];  Service: General   • LIVER LOBECTOMY N/A 11/7/2022    Procedure: SEGMENT 3 LIVER RESECTION, ABLATION, INTRAOPERATIVE U/S OF LIVER, PERITONEAL BIOPSY;  Surgeon: Giorgi Eisenberg MD;  [facility];  Service: Surgical Oncology   • RIGHT COLON RESECTION N/A 11/7/2022    Procedure: EX LAP, TRANVERSE COLON RESECTION;  Surgeon: Giorgi Eisenberg MD;  [facility];  Service: Surgical Oncology   • TONSILLECTOMY     • UMBILICAL HERNIA REPAIR LAPAROSCOPIC N/A 04/26/2019    Procedure: LAPAROSCOPIC UMBILICAL HERNIA REPAIR;  Surgeon: Erika Wu MD;  [facility];  Service: General   • VAC DRESSING APPLICATION N/A [id number]/63/7884    Procedure: APPLICATION VAC DRESSING ABDOMEN/TRUNK;  Surgeon: Chaz Lomeli MD;  [facility];  Service: Surgical Oncology     Social History   Social History     Substance and Sexual Activity   Alcohol Use Never     Social History     Substance and Sexual Activity   Drug Use No     E-Cigarette/Vaping   • E-Cigarette Use Never User      E-Cigarette/Vaping Substances   • Nicotine No    • THC No    • CBD No    • Flavoring No    • Other No    • Unknown No      Social History     Tobacco Use   Smoking Status Never   Smokeless Tobacco Never     Family History:   Family History   Problem Relation Age of Onset   • Diabetes Mother         mellitus   • Lung cancer Mother    • Coronary artery disease Father        Meds/Allergies   all current active meds have been reviewed, current meds:   Current Facility-Administered Medications   Medication Dose Route Frequency   • acetaminophen (TYLENOL) tablet 650 mg  650 mg Oral Q4H PRN   • aspirin chewable tablet 81 mg  81 mg Oral Daily   • atorvastatin (LIPITOR) tablet 40 mg  40 mg Oral Daily   • collagenase (SANTYL) ointment   Topical Daily   • DULoxetine (CYMBALTA) delayed release capsule 30 mg  30 mg Oral Daily   • heparin (porcine) subcutaneous injection 5,000 Units  5,000 Units Subcutaneous Q8H [facility]   • hydrOXYzine HCL (ATARAX) tablet 25 mg  25 mg Oral HS   • insulin glargine (LANTUS) subcutaneous injection 5 Units 0 05 mL  5 Units Subcutaneous HS   • insulin lispro (HumaLOG) 100 units/mL subcutaneous injection 1-6 Units  1-6 Units Subcutaneous TID AC   • insulin lispro (HumaLOG) 100 units/mL subcutaneous injection 1-6 Units  1-6 Units Subcutaneous HS   • insulin lispro (HumaLOG) 100 units/mL subcutaneous injection 4 Units  4 Units Subcutaneous TID With Meals   • melatonin tablet 6 mg  6 mg Oral HS PRN   • methocarbamol (ROBAXIN) tablet 500 mg  500 mg Oral BID PRN   • ondansetron (ZOFRAN) injection 4 mg  4 mg Intravenous Q4H PRN   • oxyCODONE (ROXICODONE) IR tablet 2 5 mg  2 5 mg Oral Q4H PRN   • oxyCODONE (ROXICODONE) IR tablet 5 mg  5 mg Oral Q4H PRN   • pantoprazole (PROTONIX) EC tablet 40 mg  40 mg Oral BID AC   • simethicone (MYLICON) chewable tablet 80 mg  80 mg Oral 4x Daily (with meals and at bedtime)   • sodium chloride tablet 1 g  1 g Oral Daily Before Breakfast   • tamsulosin (FLOMAX) capsule 0 4 mg  0 4 mg Oral Daily With Dinner    and PTA meds:   Prior to Admission Medications   Prescriptions Last Dose Informant Patient Reported? Taking? Cholecalciferol (VITAMIN D3 PO)   Yes No   Sig: Take 400 Units by mouth daily   Continuous Blood Gluc Sensor (FreeStyle Parrish 14 Day Sensor) [facility]   No No   Sig: Use 1 each every 14 (fourteen) days   DULoxetine (CYMBALTA) 30 mg delayed release capsule   No No   Sig: Take 1 capsule (30 mg total) by mouth daily Do not start before January 17, 2023  Insulin Pen Needle (B-D UF III MINI PEN NEEDLES) 31G X 5 MM MISC   No No   Sig: Inject under the skin daily   Insulin Pen Needle (BD Pen Needle Jessica 2nd Gen) 32G X 4 MM MISC   No No   Sig: For use with insulin pen  Pharmacy may dispense brand covered by insurance  Insulin Pen Needle (BD Pen Needle Jessica 2nd Gen) 32G X 4 MM MISC   No No   Sig: For use with insulin pen  Pharmacy may dispense brand covered by insurance  Multiple Vitamins-Minerals (multivitamin with minerals) tablet   Yes No   Sig: Take 1 tablet by mouth daily   Needle, Disp, (HYPODERMIC NEEDLE) 23G X 1-1/2" MISC   No No   Sig: by Does not apply route once a week   Ostomy Supplies MISC   No No   Sig: Use in the morning   acetaminophen (TYLENOL) 325 mg tablet   No No   Sig: Take 1 tablet (325 mg total) by mouth every 4 (four) hours as needed for mild pain   Patient not taking: Reported on 2/7/2023   amLODIPine (NORVASC) 2 5 mg tablet   No No   Sig: Take 1 tablet (2 5 mg total) by mouth daily   aspirin 81 MG tablet   Yes No   Sig: Take 81 mg by mouth daily     atorvastatin (LIPITOR) 40 mg tablet   No No   Sig: Take 1 tablet (40 mg total) by mouth daily   collagenase (SANTYL) ointment   No No   Sig: Apply topically daily To the abdominal incision area   insulin glargine (LANTUS) 100 units/mL subcutaneous injection   No No   Sig: Inject 5 Units under the skin daily at bedtime   insulin lispro (HumaLOG) 100 units/mL injection   No No   Sig: Inject 4 Units under the skin 3 (three) times a day with meals   melatonin 3 mg   No No   Sig: Take 2 tablets (6 mg total) by mouth daily at bedtime   methocarbamol (ROBAXIN) 500 mg tablet   No No   Sig: Take 1 tablet (500 mg total) by mouth 2 (two) times a day as needed for muscle spasms   naloxone (NARCAN) 4 mg/0 1 mL nasal spray   No No   Sig: Administer 1 spray into a nostril  If no response after 2-3 minutes, give another dose in the other nostril using a new spray     ondansetron (ZOFRAN-ODT) 4 mg disintegrating tablet   No No   Sig: Take 1 tablet (4 mg total) by mouth every 6 (six) hours as needed (nausea,vomiting)   Patient not taking: Reported on 2/7/2023   oxyCODONE (ROXICODONE) 5 immediate release tablet   No No   Sig: Take 1 tablet (5 mg total) by mouth every 4 (four) hours as needed for severe pain for up to 10 days Max Daily Amount: 30 mg   pantoprazole (PROTONIX) 40 mg tablet   No No   Sig: Take 1 tablet (40 mg total) by mouth 2 (two) times a day   simethicone (MYLICON) 80 mg chewable tablet   No No   Sig: Chew 1 tablet (80 mg total) 4 (four) times a day (with meals and at bedtime)   sodium chloride, PF, 0 9 %   No No   Sig: 10 mL by Intracatheter route daily   tamsulosin (FLOMAX) 0 4 mg   No No   Sig: Take 1 capsule (0 4 mg total) by mouth daily with dinner      Facility-Administered Medications: None     Allergies   Allergen Reactions   • Shellfish-Derived Products - Food Allergy Anaphylaxis   • Erythromycin GI Intolerance       Objective   First Vitals:   Blood Pressure: 136/87 (02/24/23 1512)  Pulse: 81 (02/24/23 1512)  Temperature: 98 1 °F (36 7 °C) (02/24/23 1512)  Temp Source: Oral (02/24/23 1512)  Respirations: 17 (02/24/23 1512)  Height: 5' 9" (175 3 cm) (02/25/23 1257)  Weight - Scale: 81 5 kg (179 lb 9 6 oz) (02/25/23 1257)  SpO2: 97 % (02/24/23 1512)    Current Vitals:   Blood Pressure: 113/77 (02/27/23 0831)  Pulse: 83 (02/27/23 0613)  Temperature: 98 °F (36 7 °C) (02/27/23 0613)  Temp Source: Oral (02/27/23 8399)  Respirations: 18 (02/27/23 2031)  Height: 5' 9" (175 3 cm) (02/25/23 1257)  Weight - Scale: 81 5 kg (179 lb 9 6 oz) (02/25/23 1257)  SpO2: 96 % (02/27/23 4719)      Intake/Output Summary (Last 24 hours) at 2/27/2023 1302  Last data filed at 2/27/2023 0836  Gross per 24 hour   Intake 455 67 ml   Output 1100 ml   Net -644 33 ml       Invasive Devices     Central Venous Catheter Line  Duration           Port A Cath 03/10/22 Right Chest 354 days          Drain  Duration           Ileostomy  days    Cholecystostomy Tube 3 days                Physical Exam   General: Pt is AAOx3, sitting up in bedside chair in NAD  HEENT:  normocephalic, no scleral icterus, moist mucous membranes   Neck: Supple, non-tender, ROM intact   CV: RRR, no murmurs, gallops, rubs  S1 and S2  Resp: Lung sounds clear to auscultation B/L, normal respiratory effort no  wheezes, rhonchi, rhales   Abd: Soft, with mild tenderness near cholecystostomy tube, non-distended, non-tympanitic  Normoactive bowelsounds all 4 quadrants  No rebound or guarding  No CVA tenderness  Abdominal midline incision healing well, healthy appearing tissue at base  Stool in ostomy appliance with air in bag  Ext: Warm with no cyanosis, no edema, no deformities  ROM intact   Skin: No rashes, bruises, ulcers  Neuro: Sensation intact all 4 extremities  5+ strength all 4 extremities  Lab Results:   I have personally reviewed pertinent lab results    , CBC:   Lab Results   Component Value Date    WBC 13 41 (H) 02/27/2023    HGB 8 7 (L) 02/27/2023    HCT 27 3 (L) 02/27/2023    MCV 83 02/27/2023     02/27/2023    MCH 26 5 (L) 02/27/2023    MCHC 31 9 02/27/2023    RDW 16 5 (H) 02/27/2023    MPV 10 2 02/27/2023    NRBC 0 02/27/2023   , CMP:   Lab Results   Component Value Date    SODIUM 131 (L) 02/27/2023    K 4 1 02/27/2023     02/27/2023    CO2 19 (L) 02/27/2023    BUN 16 02/27/2023    CREATININE 1 18 02/27/2023    CALCIUM 8 2 (L) 02/27/2023 EGFR 67 02/27/2023   , Coagulation: No results found for: PT, INR, APTT, Urinalysis: No results found for: Marilynn Alamin, SPECGRAV, PHUR, LEUKOCYTESUR, NITRITE, PROTEINUA, GLUCOSEU, KETONESU, BILIRUBINUR, BLOODU, Amylase: No results found for: AMYLASE, Lipase: No results found for: LIPASE  Imaging: I have personally reviewed pertinent reports  EKG, Pathology, and Other Studies: I have personally reviewed pertinent reports  Counseling / Coordination of Care  Total floor / unit time spent today 30 minutes  Greater than 50% of total time was spent with the patient and / or family counseling and / or coordination of care  A description of the counseling / coordination of care      Alex Kilpatrick

## 2023-02-27 NOTE — PROGRESS NOTES
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  • s/p diverting loop colostomy/liver biopsy with subsequent chemotherapy 2/2022  • s/p hepatic resection and reversal of colostomy on 11/7/22  • s/p ex-lap with bowel resection/VAC placement 11/16/22  • s/p right hemicolectomy with partial descending colectomy, colocolonic anastomosis with loop ileostomy and mid line abdominal VAC placement 11/17  • s/p additional drains placed for a perisplenic abscess and splenic abscess 12/20/22 (then had 3 drains plus the already existing perc radha tube)  • The 2 left lateral drains were removed by IR 1/31/23 as an OP  • Had previously been tx'd with Ertapenem  • This hospital stay had fever/leukocytosis/sepsis, started Meropenem and then to Vancomycin and Ertapenem  • Wound cx again = Ecoli ESBL  • LD Ertapenem 2/14, Vancomycin 2/16  • To IR 2/24 = Abscessogram of midline drain showed minimal persistent collection and the midline catheter was removed  • Recent wound debridement by general surgery prior to transfer = continue Santyl qd to wound     Acute cholecystitis  • s/p percutaneous tube placement 12/8/22  • CT scan was concerning for cholecystitis --> to IR 2/24 = showed retraction of the radha tube and interval occlusion of the cystic duct   +Dino-enteric fistula   Radha tube was exchanged  • Drainage from perc radha tube is milky light green    Surgery to see     PORT catheter  • On 2/24/23 in IR = port check showed probable small thrombus at tip of cathter, flushed/improved aspiration of the port     Hypertension  • D/c Norvasc 2 5mg qd (not gotten today 2/27 or yesterday 2/26)  • Did have some symptomatic orthostasis on 7/71/09 98 systolic   • SBP sitting 7/31 = 99 but no sx documented  • Will watch     Diabetes mellitus  • Home:  Lantus 8U qam/Lispro 4U TID  • Here:  Lantus 5U qhs/Lispro 4U TID  • Has a DEXCOM meter and a regular meter at home  • Continue DM diet, QID Accuchecks/SSI     ALEXY/CKD III  • Baseline 1 2-1 4  • Creat was 2 5 on admission with a K+ level of 5 9  • Currently creat is 1 18     Anemia  • Transfused in December and January  • Hemoglobin was 7 1 on 2/26 = transfused x 1  • Hemoglobin today is up to 8 7     Situational depression  • Cymbalta      Left pleural effusion  • Thoracentesis 1/12 for 300ml  • Cyto from pleural fluid was negative for malignancy; cx = NG  • CT 2/22/23 = "Interval worsening of moderate right and small left pleural effusions"  • Will watch     Hyponatremia  • Mild  • Felt likely 2/2 SIADH in setting of malignancy  • Down to 131 from 132  • Will add NACL 1 GM qd     Malnutrition  • Magic cups BID  • Prefers premier protein banana flavored however we do not carry that here     Low grade temp  • Was 100 3 on evening of 2/25  • Has mild leukocytosis  • Afebrile since this episode  • ID did see today = watching for now; recheck CBC in AM and if temp >101 then to check blood cx and consider repeat CT of the abd/pelvis        Discharge date:  Team    The above assessment and plan was reviewed and updated as determined by my evaluation of the patient on 2/27/2023      Labs:   Results from last 7 days   Lab Units 02/27/23  0607 02/26/23  0619   WBC Thousand/uL 13 41* 11 85*   HEMOGLOBIN g/dL 8 7* 7 1*   HEMATOCRIT % 27 3* 23 1*   PLATELETS Thousands/uL 355 313     Results from last 7 days   Lab Units 02/27/23  0607 02/25/23  0552   SODIUM mmol/L 131* 132*   POTASSIUM mmol/L 4 1 3 6   CHLORIDE mmol/L 104 103   CO2 mmol/L 19* 21   BUN mg/dL 16 16   CREATININE mg/dL 1 18 1 28   CALCIUM mg/dL 8 2* 7 8*             Results from last 7 days   Lab Units 02/27/23  1144 02/27/23  0628 02/26/23  2118   POC GLUCOSE mg/dl 162* 102 154*       Review of Scheduled Meds:  Current Facility-Administered Medications   Medication Dose Route Frequency Provider Last Rate   • acetaminophen  650 mg Oral Q4H PRN Helenmo Hassan, DO     • amLODIPine  2 5 mg Oral Daily Helenmo Hassan, DO     • aspirin  81 mg Oral Daily Helenmo Hassan, DO     • atorvastatin  40 mg Oral Daily Helenmo Hassan, DO     • collagenase   Topical Daily Helenmo Hassan, DO     • DULoxetine  30 mg Oral Daily Helenmo Hassan, DO     • heparin (porcine)  5,000 Units Subcutaneous Q8H [facility] Sonya, DO     • hydrOXYzine HCL  25 mg Oral HS [place] Caitlyn, CRNP     • insulin glargine  5 Units Subcutaneous HS Helenmo Hassan, DO     • insulin lispro  1-6 Units Subcutaneous TID  Helenmo Hassan, DO     • insulin lispro  1-6 Units Subcutaneous HS Helenmo Hassan, DO     • insulin lispro  4 Units Subcutaneous TID With Meals Helenmo Hassan, DO     • melatonin  6 mg Oral HS PRN Helenmo Hassan, DO     • methocarbamol  500 mg Oral BID PRN Helenmo Hassan, DO     • ondansetron  4 mg Intravenous Q4H PRN Helenmo Hassan, DO     • oxyCODONE  2 5 mg Oral Q4H PRN Helenmo Hassan, DO     • oxyCODONE  5 mg Oral Q4H PRN Helenmo Hassan, DO     • pantoprazole  40 mg Oral BID AC Helenmo Hassan, DO     • simethicone  80 mg Oral 4x Daily (with meals and at bedtime) Helenmo Hassan, DO     • tamsulosin  0 4 mg Oral Daily With Dinner Helenmo Hassan, DO         Subjective/ HPI: Patient seen and examined  Patients overnight issues or events were reviewed with nursing or staff during rounds or morning huddle session  New or overnight issues include the following:     No new or overnight issues  Offers no complaints    ROS:   A 10 point ROS was performed; negative except as noted above         Imaging:     No orders to display       *Labs /Radiology studies reviewed  *Medications reviewed and reconciled as needed  *Please refer to order section for additional ordered labs studies  *Case discussed with primary attending during morning huddle case rounds    Physical Examination:  Vitals:   Vitals:    02/26/23 1330 02/26/23 2154 02/27/23 0613 02/27/23 0831   BP: 135/73 131/82 120/82 113/77   BP Location:  Right arm Left arm    Pulse: 77 (!) 110 83    Resp: 16 18 18    Temp:  98 8 °F (37 1 °C) 98 °F (36 7 °C)    TempSrc: Oral Oral Oral    SpO2: 97% 96% 96%    Weight:       Height:           General Appearance: no distress, conversive  HEENT:  External ear normal   Nose normal w/o drainage  Mucous membranes are moist  Oropharynx is clear  Conjunctiva clear w/o icterus or redness  Neck:  Supple, normal ROM  Lungs: BBS without crackles/wheeze/rhonchi; respirations unlabored with normal inspiratory/expiratory effort  No retractions noted  On RA  CV: regular rate and rhythm; no rubs/murmurs/gallops, PMI normal   ABD: Abdomen is soft  Bowel sounds all quadrants  Nontender with no distention  Perc radha tube draining brown drainage  Ostomy with loose watery stool  EXT: no edema  Skin: normal turgor, normal texture, no rashes  Psych: affect normal, mood normal  Neuro: AAO      The above physical exam was reviewed and updated as determined by my evaluation of the patient on 2/27/2023  Invasive Devices     Central Venous Catheter Line  Duration           Port A Cath 03/10/22 Right Chest 354 days          Drain  Duration           Ileostomy  days    Cholecystostomy Tube 3 days                   VTE Pharmacologic Prophylaxis: Heparin but has been refusing  Code Status: Level 1 - Full Code  Current Length of Stay: 3 day(s)      Total time spent:  30 minutes with more than 50% spent counseling/coordinating care  Counseling includes discussion with patient re: progress  and discussion with patient of his/her current medical state/information  Coordination of patient's care was performed in conjunction with primary service  Time invested included review of patient's labs, vitals, and management of their comorbidities with continued monitoring   In addition, this patient was discussed with medical team including physician and advanced extenders  The care of the patient was extensively discussed and appropriate treatment plan was formulated unique for this patient  Medical decision making for the day was made by supervising physician unless otherwise noted in their attestation statement  ** Please Note:  voice to text software may have been used in the creation of this document   Although proof errors in transcription or interpretation are a potential of such software** within normal limits

## 2023-02-27 NOTE — DISCHARGE INSTR - OTHER ORDERS
Skin Care Plan:  1-Midline Incision: Cleanse wound with NS and pat dry  Apply Santyl to [facility] Tissue Only in a Nickel Thick Layer  Cover with an ABD and secure with minimal tape  Change daily or PRN soilage or displacement  2-Turn/reposition q2h or when medically stable for pressure re-distribution on skin   3-Elevate heels to offload pressure  4-Moisturize skin daily with skin nourishing cream  5-Ehob cushion in chair when out of bed  6-Preventative Hydraguard to bilateral heels and sacro-buttocks BID and PRN  7-Ambulatory Referral to outpatient wound center for continued management of midline incision wound  Referral Placed  8-Left Upper Abdomen Wound: Cleanse wound with NS and pat dry  Apply maxorb AG to wound bed and cover with an ABD, secure with minimal tape  Change daily or PRN soilage or displacement  Schedule Follow-up Outpatient Wound Appointment  Ambulatory Referral Placed  Call Outpatient Wound and Ostomy Care Team @ [phone]   [facility]  [facility]       Ostomy Care:  1  Peel back pouch using push-pull method, may use non-alcohol adhesive remover(Purple and white package)  2  Use warm water only to cleanse skin around the stoma (james-stomal skin)  3  Make sure all adhesive residue is removed and skin is dry and not oily  4  Measure stoma size using measuring guide and trace correct measurements onto back of pouch  5  Then mold the backing of pouch out to correct shape/size  6  Use 3M no sting barrier film to prep the skin around the stoma  7  Place pouch over stoma and onto skin  8  Use warmth of hand to apply gentle pressure to help backing of pouch to adhere well to skin  9  Empty pouch when 1/3 full  10  Change pouch every 4-5 days or if signs of leaking

## 2023-02-27 NOTE — WOUND OSTOMY CARE
Consult Note - Wound   Marjoel Nicol 61 y o  male MRN: [id number]  Unit/Bed#: [facility] 048-86 Encounter: [id number]        History and Present Illness:  Patient is a 62 yo male that was admitted to [facility] for rehab post treatment of sepsis  Patient has a PMH of hypertension, hyperlipidemia, ventral hernia, GERD  Patient is a min/mod assist for turning and repositioning  Patient's bowel managed via ostomy and patient is continent of bladder  On assessment, patient is sitting on regular mattress  Wound Care was consulted for abdominal wound, ostomy assistance, toe wounds  Assessment Findings:   B/L heels are dry intact and ramon with no skin loss or wounds present  Recommend preventative Hydraguard Cream and proper offloading/ repositioning  B/L sacro-buttocks is dry, intact, pink in color and blanches  No skin loss or wounds present  Recommend preventative hydragaurd to area  - B/L sets of toes are intact with no skin loss or wounds present  1  Midline Incision Wound: area appears smaller than it was on previously admission to [facility]  Wound bed is irregular in shape with full thickness skin loss  Wound bed is 80% beefy red granulation tissue and 20% yellow and brown slough tissue  Nisa-wound is intact with scar tissue  Small serosanguineous drainage noted from area  Santyl was applied to yellow slough tissue only and entire area was covered with an ABD and secured with minimal tape  No induration, fluctuance, odor, warmth/temperature differences, redness, or purulence noted to the above noted wounds and skin areas assessed  New dressings applied per orders listed below  Patient tolerated well- no s/s of non-verbal pain or discomfort observed during the encounter  Bedside nurse aware of plan of care  See flow sheets for more detailed assessment findings  Orders listed below and wound care will continue to follow, call or tiger text with questions       Skin Care Plan:  1-Midline Incision: Cleanse wound with NS and pat dry  Apply Santyl to [facility] Tissue Only in a Nickel Thick Layer  Cover with an ABD and secure with minimal tape  Change daily or PRN soilage or displacement  2-Turn/reposition q2h or when medically stable for pressure re-distribution on skin   3-Elevate heels to offload pressure  4-Moisturize skin daily with skin nourishing cream  5-Ehob cushion in chair when out of bed  6-Preventative Hydraguard to bilateral heels and sacro-buttocks BID and PRN  OSTOMY:   Patient with well established ileostomy in LUQ- patient reports confidence with ostomy pouch changes now due to his increase in strength  Stoma is oval in shape, red in color, well budded  Patient requested pouch change from [facility] RNs due to leakage  New Convatec One Piece Pouch was applied  Ostomy Care:  1  Peel back pouch using push-pull method, may use non-alcohol adhesive remover(Purple and white package)  2  Use warm water only to cleanse skin around the stoma (james-stomal skin)  3  Make sure all adhesive residue is removed and skin is dry and not oily  4  Measure stoma size using measuring guide and trace correct measurements onto back of pouch  5  Then mold the backing of pouch out to correct shape/size  6  Use 3M no sting barrier film to prep the skin around the stoma  7  Place pouch over stoma and onto skin  8  Use warmth of hand to apply gentle pressure to help backing of pouch to adhere well to skin  9  Empty pouch when 1/3 full  10  Change pouch every 4-5 days or if signs of leaking  Wounds:  Wound 11/07/22 Abdomen N/A (Active)   Wound Image   02/27/23 0840   Wound Description Beefy red;Slough 02/27/23 0840   James-wound Assessment Clean;Dry; Intact 02/27/23 0840   Wound Length (cm) 9 cm 02/27/23 0840   Wound Width (cm) 3 cm 02/27/23 0840   Wound Depth (cm) 0 4 cm 02/27/23 0840   Wound Surface Area (cm^2) 27 cm^2 02/27/23 0840   Wound Volume (cm^3) 10 8 cm^3 02/27/23 0840   Calculated Wound Volume (cm^3) 10 8 cm^3 02/27/23 0840   Change in Wound Size % 97 79 02/27/23 0840   Tunneling 0 cm 02/27/23 0840   Tunneling in depth located at 00 02/27/23 0840   Undermining 0 02/27/23 0840   Undermining is depth extending from 0 02/27/23 0840   Wound Site Closure DONNA 02/27/23 0840   Drainage Amount Small 02/27/23 0840   Drainage Description Serosanguineous 02/27/23 0840   Non-staged Wound Description Full thickness 02/27/23 0840   Treatments Cleansed 02/27/23 0840   Dressing Enzymatic debrider;Dry dressing 02/27/23 0840   Wound packed? No 02/27/23 0840   Packing- # removed 0 02/27/23 0840   Packing- # inserted 0 02/27/23 0840   Dressing Changed New 02/27/23 0840   Patient Tolerance Tolerated well 02/27/23 0840   Dressing Status Clean;Dry; Intact 02/27/23 0840               Zia Gomez RN, BSN

## 2023-02-27 NOTE — PROGRESS NOTES
02/27/23 1300   Pain Assessment   Pain Assessment Tool 0-10   Pain Score 7   Pain Location/Orientation Location: Generalized   Restrictions/Precautions   Precautions Fall Risk;Contact/isolation;Multiple lines;Pain;Supervision on toilet/commode   Braces or Orthoses   (TEDS)   Subjective   Subjective Pt agreeable to perform skilled PT   Sit to Stand   Type of Assistance Needed Physical assistance   Physical Assistance Level 25% or less   Sit to Stand CARE Score 3   Bed-Chair Transfer   Type of Assistance Needed Physical assistance   Physical Assistance Level 25% or less   Chair/Bed-to-Chair Transfer CARE Score 3   Transfer Bed/Chair/Wheelchair   Adaptive Equipment Roller Walker   Walk 10 Feet   Type of Assistance Needed Physical assistance   Physical Assistance Level 26%-50%   Comment HHA   Walk 10 Feet CARE Score 3   Walk 50 Feet with Two Turns   Type of Assistance Needed Physical assistance   Physical Assistance Level 26%-50%   Comment HHA   Walk 50 Feet with Two Turns CARE Score 3   Ambulation   Primary Mode of Locomotion Prior to Admission Walk   Distance Walked (feet) 120 ft  (x3 x60 feet hallway)   Assist Device Hand Hold   Does the patient walk? 2  Yes   Curb or Single Stair   Style negotiated Single stair   Type of Assistance Needed Physical assistance   Physical Assistance Level 26%-50%   1 Step (Curb) CARE Score 3   4 Steps   Type of Assistance Needed Physical assistance   Physical Assistance Level 26%-50%   Comment blhr   4 Steps CARE Score 3   12 Steps   Reason if not Attempted Safety concerns   12 Steps CARE Score 88   Therapeutic Interventions   Strengthening LAQ AP hhip flexion x20 reps   Flexibility HS and calfs   Balance dynamic balance standing   Equipment Use   NuStep lvl 1 for 10 min   Assessment   Treatment Assessment pt focus on SPT STS and walking HHA up to 120 feet , Also 60 feet HHA on HR in hallway to inc gait speed   Pt will cont to neet skilled PT to meet DC goals   Pt needs rest breaks and LE manual strtetch ing   Ortho BP taken see vitals   Pt c/o being a little dizziness during standing and walking   Pt will con ttoward DC goals   Barriers to Discharge Inaccessible home environment;Decreased caregiver support   Plan   Progress Progressing toward goals   Recommendation   PT Discharge Recommendation Home with home health rehabilitation   PT Therapy Minutes   PT Time In 1330   PT Time Out 1500   PT Total Time (minutes) 90   PT Mode of treatment - Individual (minutes) 90   PT Mode of treatment - Concurrent (minutes) 0   PT Mode of treatment - Group (minutes) 0   PT Mode of treatment - Co-treat (minutes) 0   PT Mode of Treatment - Total time(minutes) 90 minutes   PT Cumulative Minutes 220   Therapy Time missed   Time missed?  No

## 2023-02-27 NOTE — CONSULTS
Consultation - Infectious Disease   Sam Leslie 61 y o  male MRN: [id number]  Unit/Bed#: -01 Encounter: [id number]      IMPRESSION & RECOMMENDATIONS:   Impression/Recommendations:  SIRS versus sepsis  WBC, HR, low-grade temp  Consider due to ongoing intraabdominal processes as below  Consider infection given temporal response to antibiotics  Consider tumor fever in setting of known liver metastases  Clinically stable and non-toxic  Rec:  · Continue to follow closely off antibiotics  · Surgical oncology consult to evaluate radha drain output  · Follow temperatures closely  · Recheck CBC in AM  · If develops T>101 check blood cultures and consider repeat CT A/P    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  In setting of chronic cholecystostomy tube, perihepatic abscess as below  Radha tube now with drainage consistent with this  Recent intra-abdominal abscess     In the setting complex surgical history as below   Initially developed 11/2022 accompanied by ESBL E  coli bacteremia   Status post exploratory laparotomy, right hemicolectomy, temporary abdominal closure 11/16; re-exploration, partial left colectomy, primary ileocolonic anastomosis with proximal diverting loop ileostomy, midline fascial closure on 11/17   Then developed abscess in hepatectomy bed, also collection +/- leak LUQ at area of prior colonic anastamosis   Status post IR drain into perihepatic collection 12/8   Cultures with ESBL E  Coli   Status post 7 days ertapenem   Developed recurrent sepsis and repeat CT 12/17 shows hepatic bed collection improved, persistent left intra-abdominal collection   Status post perigastic, perisplenic drains 12/20   Cultures again with ESBL E  Coli   Status post 21 days total antibiotics, completed 1/7   Left drains x2 removed 1/31  Midline hepatic bed drain removed 2/24  Collections improving off antibiotics          Possible acute cholecystitis     Status post percutaneous cholecystostomy tube   Alk phos remains markedly elevated, possibly due to drain itself versus known intrahepatic metastases      Chronic abdominal wound  In setting of prior incisional dehiscence  Status post recent debridement  Wound now appears clean  Metastatic colon cancer     To liver, possible lung   Status post resection, chemotherapy, more recent hepatic resection and ablation, transverse colon resection      CKD     Baseline Cr 1 4       DM  The above impression and plan was discussed in detail with the patient and Era Brockway, [id number] with Surg Onc  Antibiotics:  None    Thank you for this consultation  We will follow along with you  HISTORY OF PRESENT ILLNESS:  Reason for Consult: Leukocytosis    HPI: Romy Weathers is a 61 y o  male with metastatic colon cancer with a complicated surgical history as outlined above  Most recently had multiple intra-abdominal abscesses managed with percutaneous drainage and antibiotics  Also has chronic cholecystostomy tube from prior episode of possible cholecystitis with more recent [facility] developed coloenteric fistula  Recent admission at [facility] for SIRS versus sepsis attributed to possible wound infection managed with a short course of antibiotics and debridement  Developed some intermittent low-grade fevers and leukocytosis off antibiotics  Recently transferred to [facility]  We are asked to comment on further evaluation and management  In performing this consult, I have reviewed prior admission and outpatient visit records in detail  REVIEW OF SYSTEMS:  Feels well  Denies fevers, chills, sweats, nausea, vomiting, or diarrhea  A complete system-based review of systems is otherwise negative      PAST MEDICAL HISTORY:  Past Medical History:   Diagnosis Date   • Abdominal pain 03/12/2022   • Acute renal failure ([facility] [address] )     [id number] resolved   • Acute respiratory failure with hypoxia ([facility] [address] ) 11/12/2022   • Bacteremia 11/12/2022   • Cancer [facility])    • Diabetes mellitus ([place] [address] )    • Enteritis 08/23/2016   • Flash pulmonary edema ([place] [address] ) 11/12/2022   • Gastroparesis due to DM ([place] [address] ) 08/23/2016   • GERD (gastroesophageal reflux disease)    • Hernia, ventral 08/04/2016   • Hyperlipidemia    • Hypertension    • Morbid obesity ([place] [address] ) 04/17/2018   • Postoperative visit 03/02/2022   • SIRS (systemic inflammatory response syndrome) ([place] [address] ) 03/12/2022   • Snoring    • Stage 3a chronic kidney disease ([place] [address] ) 02/19/2022     Past Surgical History:   Procedure Laterality Date   • COLONOSCOPY     • COLOSTOMY N/A 02/20/2022    Procedure: COLOSTOMY LOOP, diverting;  Surgeon: Krishna Damico MD;  [facility]: [facility];  Service: General   • ESOPHAGOGASTRODUODENOSCOPY N/A 08/24/2016    Procedure: ESOPHAGOGASTRODUODENOSCOPY (EGD); Surgeon: Krystal Rivera MD;  Location: [facility];   Service:    • EXPLORATORY LAPAROTOMY W/ BOWEL RESECTION N/A 11/16/2022    Procedure: LAPAROTOMY EXPLORATORY W/ BOWEL RESECTION- VAC PLACEMENT;  Surgeon: Daniel Riddle MD;  Location: [facility];  Service: Surgical Oncology   • EXPLORATORY LAPAROTOMY W/ BOWEL RESECTION N/A 11/17/2022    Procedure: LAPAROTOMY EXPLORATORY W/ BOWEL RESECTION;  Surgeon: Daniel Riddle MD;  [facility];  Service: Surgical Oncology   • ILEOSTOMY N/A 11/17/2022    Procedure: ILEOSTOMY;  Surgeon: Daniel Riddle MD;  [facility];  Service: Surgical Oncology   • ILEOSTOMY CLOSURE N/A 11/7/2022    Procedure: REVERSAL COLOSTOMY;  Surgeon: Bruno Joe MD;  [facility];  Service: Surgical Oncology   • IR CHOLECYSTOSTOMY TUBE CHECK AND/OR REMOVAL  2/24/2023   • IR CHOLECYSTOSTOMY TUBE CHECK/CHANGE/REPOSITION/REINSERTION/UPSIZE  12/12/2022   • IR CHOLECYSTOSTOMY TUBE PLACEMENT  12/8/2022   • IR DRAINAGE TUBE CHECK AND/OR REMOVAL  1/3/2023   • IR DRAINAGE TUBE CHECK/CHANGE/REPOSITION/REINSERTION/UPSIZE  1/12/2023   • IR DRAINAGE TUBE CHECK/CHANGE/REPOSITION/REINSERTION/UPSIZE  1/31/2023   • IR DRAINAGE TUBE CHECK/CHANGE/REPOSITION/REINSERTION/UPSIZE  2/10/2023   • IR DRAINAGE TUBE CHECK/CHANGE/REPOSITION/REINSERTION/UPSIZE  2/16/2023   • IR DRAINAGE TUBE CHECK/CHANGE/REPOSITION/REINSERTION/UPSIZE  2/24/2023   • IR DRAINAGE TUBE PLACEMENT  12/8/2022   • IR DRAINAGE TUBE PLACEMENT  12/20/2022   • IR PORT CHECK  2/24/2023   • IR PORT PLACEMENT  03/10/2022   • IR THORACENTESIS  1/12/2023   • KIDNEY STONE SURGERY     • LIVER BIOPSY LAPAROSCOPIC N/A 02/20/2022    Procedure: DIAGNOSTIC LAPAROSCOPIC LIVER BIOPSY, DIVERTING LOOP COLOSTOMY ;  Surgeon: Pio Chase MD;  [facility];  Service: General   • LIVER LOBECTOMY N/A 11/7/2022    Procedure: SEGMENT 3 LIVER RESECTION, ABLATION, INTRAOPERATIVE U/S OF LIVER, PERITONEAL BIOPSY;  Surgeon: Lorena Freed MD;  [facility]: [facility];  Service: Surgical Oncology   • RIGHT COLON RESECTION N/A 11/7/2022    Procedure: EX LAP, TRANVERSE COLON RESECTION;  Surgeon: Lorena Freed MD;  [facility];  Service: Surgical Oncology   • TONSILLECTOMY     • UMBILICAL HERNIA REPAIR LAPAROSCOPIC N/A 04/26/2019    Procedure: LAPAROSCOPIC UMBILICAL HERNIA REPAIR;  Surgeon: Pio Chase MD;  [facility];  Service: General   • VAC DRESSING APPLICATION N/A 56/00/9721    Procedure: APPLICATION VAC DRESSING ABDOMEN/TRUNK;  Surgeon: Julia Monsivais MD;  [facility];  Service: Surgical Oncology       FAMILY HISTORY:  Non-contributory    SOCIAL HISTORY:  Social History     Substance and Sexual Activity   Alcohol Use Never     Social History     Substance and Sexual Activity   Drug Use No     Social History     Tobacco Use   Smoking Status Never   Smokeless Tobacco Never       ALLERGIES:  Allergies   Allergen Reactions   • Shellfish-Derived Products - Food Allergy Anaphylaxis   • Erythromycin GI Intolerance       MEDICATIONS:  All current active medications have been reviewed      PHYSICAL EXAM:  Vitals:  Temp:  [97 4 °F (36 3 °C)-98 8 °F (37 1 °C)] 98 °F (36 7 °C)  HR:  [] 83  Resp:  [16-18] 18  BP: (113-135)/(73-82) 113/77  SpO2:  [96 %-98 %] 96 %  Temp (24hrs), Av 1 °F (36 7 °C), Min:97 4 °F (36 3 °C), Max:98 8 °F (37 1 °C)  Current: Temperature: 98 °F (36 7 °C)     Physical Exam:  General:  Well-nourished, well-developed, in no acute distress  Eyes:  Conjunctive clear with no hemorrhages or effusions  Oropharynx:  No ulcers, no lesions  Neck:  Supple, no lymphadenopathy  Lungs:  Normal respiratory excursion, no accessory muscle use  Cardiac:  Regular rate and rhythm, extremities well perfused  Abdomen:  Soft, non-tender, radha tube with cloudy tan fluid  Extremities:  No peripheral cyanosis, clubbing, or edema  Skin:  No rashes, no ulcers  Neurological:  Moves all four extremities spontaneously, sensation grossly intact    LABS, IMAGING, & OTHER STUDIES:  Lab Results:  I have personally reviewed pertinent labs  Results from last 7 days   Lab Units 23  0607 23  0552 23  0532   POTASSIUM mmol/L 4 1 3 6 3 7   CHLORIDE mmol/L 104 103 101   CO2 mmol/L 19* 21 20*   BUN mg/dL 16 16 14   CREATININE mg/dL 1 18 1 28 1 36*   EGFR ml/min/1 73sq m 67 61 56   CALCIUM mg/dL 8 2* 7 8* 7 6*   AST U/L  --   --  51*   ALT U/L  --   --  9*   ALK PHOS U/L  --   --  790*     Results from last 7 days   Lab Units 23  0607 23  0619 23  0552   WBC Thousand/uL 13 41* 11 85* 10 23*   HEMOGLOBIN g/dL 8 7* 7 1* 7 5*   PLATELETS Thousands/uL 355 313 328     Results from last 7 days   Lab Units 23  1525 23  0959   BLOOD CULTURE   --  No Growth After 4 Days  No Growth After 4 Days  MRSA CULTURE ONLY  No Methicillin Resistant Staphlyococcus aureus (MRSA) isolated  --        Imaging Studies:   I have personally reviewed pertinent imaging study reports and images in PACS  CT A/P  reviewed personally bilateral pleural effusions, increased liver metastases, drains in place      EKG, Pathology, and Other Studies:   I have personally reviewed pertinent reports

## 2023-02-27 NOTE — PLAN OF CARE
Problem: Nutrition/Hydration-ADULT  Goal: Nutrient/Hydration intake appropriate for improving, restoring or maintaining nutritional needs  Description: Monitor and assess patient's nutrition/hydration status for malnutrition  Collaborate with interdisciplinary team and initiate plan and interventions as ordered  Monitor patient's weight and dietary intake as ordered or per policy  Utilize nutrition screening tool and intervene as necessary  Determine patient's food preferences and provide high-protein, high-caloric foods as appropriate       INTERVENTIONS:  - Monitor oral intake, urinary output, labs, and treatment plans  - Assess nutrition and hydration status and recommend course of action  - Evaluate amount of meals eaten  - Assist patient with eating if necessary   - Allow adequate time for meals  - Recommend/ encourage appropriate diets, oral nutritional supplements, and vitamin/mineral supplements  - Order, calculate, and assess calorie counts as needed  - Recommend, monitor, and adjust tube feedings and TPN/PPN based on assessed needs  - Assess need for intravenous fluids  - Provide specific nutrition/hydration education as appropriate  - Include patient/family/caregiver in decisions related to nutrition  2/27/2023 1356 by Sam Borjas  Outcome: Progressing  2/27/2023 1356 by Sam Borjas  Outcome: Progressing

## 2023-02-28 ENCOUNTER — TELEPHONE (OUTPATIENT)
Dept: HEMATOLOGY ONCOLOGY | Facility: CLINIC | Age: 58
End: 2023-02-28

## 2023-02-28 LAB
BASOPHILS # BLD AUTO: 0.07 THOUSANDS/ÂΜL (ref 0–0.1)
BASOPHILS NFR BLD AUTO: 1 % (ref 0–1)
EOSINOPHIL # BLD AUTO: 0 THOUSAND/ÂΜL (ref 0–0.61)
EOSINOPHIL NFR BLD AUTO: 0 % (ref 0–6)
ERYTHROCYTE [DISTWIDTH] IN BLOOD BY AUTOMATED COUNT: 16.8 % (ref 11.6–15.1)
GLUCOSE SERPL-MCNC: 123 MG/DL (ref 65–140)
GLUCOSE SERPL-MCNC: 126 MG/DL (ref 65–140)
GLUCOSE SERPL-MCNC: 161 MG/DL (ref 65–140)
GLUCOSE SERPL-MCNC: 219 MG/DL (ref 65–140)
HCT VFR BLD AUTO: 26.6 % (ref 36.5–49.3)
HGB BLD-MCNC: 8.3 G/DL (ref 12–17)
IMM GRANULOCYTES # BLD AUTO: 0.3 THOUSAND/UL (ref 0–0.2)
IMM GRANULOCYTES NFR BLD AUTO: 2 % (ref 0–2)
LYMPHOCYTES # BLD AUTO: 1.49 THOUSANDS/ÂΜL (ref 0.6–4.47)
LYMPHOCYTES NFR BLD AUTO: 11 % (ref 14–44)
MCH RBC QN AUTO: 26.1 PG (ref 26.8–34.3)
MCHC RBC AUTO-ENTMCNC: 31.2 G/DL (ref 31.4–37.4)
MCV RBC AUTO: 84 FL (ref 82–98)
MONOCYTES # BLD AUTO: 1.49 THOUSAND/ÂΜL (ref 0.17–1.22)
MONOCYTES NFR BLD AUTO: 11 % (ref 4–12)
NEUTROPHILS # BLD AUTO: 9.72 THOUSANDS/ÂΜL (ref 1.85–7.62)
NEUTS SEG NFR BLD AUTO: 75 % (ref 43–75)
NRBC BLD AUTO-RTO: 0 /100 WBCS
PLATELET # BLD AUTO: 363 THOUSANDS/UL (ref 149–390)
PMV BLD AUTO: 10 FL (ref 8.9–12.7)
RBC # BLD AUTO: 3.18 MILLION/UL (ref 3.88–5.62)
WBC # BLD AUTO: 13.07 THOUSAND/UL (ref 4.31–10.16)

## 2023-02-28 RX ORDER — HYDROXYZINE HYDROCHLORIDE 10 MG/1
10 TABLET, FILM COATED ORAL
Status: DISCONTINUED | OUTPATIENT
Start: 2023-02-28 | End: 2023-03-14 | Stop reason: HOSPADM

## 2023-02-28 RX ADMIN — INSULIN GLARGINE 5 UNITS: 100 INJECTION, SOLUTION SUBCUTANEOUS at 22:38

## 2023-02-28 RX ADMIN — SIMETHICONE 80 MG: 80 TABLET, CHEWABLE ORAL at 09:00

## 2023-02-28 RX ADMIN — INSULIN LISPRO 1 UNITS: 100 INJECTION, SOLUTION INTRAVENOUS; SUBCUTANEOUS at 22:38

## 2023-02-28 RX ADMIN — HYDROXYZINE HYDROCHLORIDE 10 MG: 10 TABLET ORAL at 22:37

## 2023-02-28 RX ADMIN — INSULIN LISPRO 4 UNITS: 100 INJECTION, SOLUTION INTRAVENOUS; SUBCUTANEOUS at 12:31

## 2023-02-28 RX ADMIN — SODIUM CHLORIDE 1 G: 1 TABLET ORAL at 06:25

## 2023-02-28 RX ADMIN — TAMSULOSIN HYDROCHLORIDE 0.4 MG: 0.4 CAPSULE ORAL at 16:35

## 2023-02-28 RX ADMIN — SIMETHICONE 80 MG: 80 TABLET, CHEWABLE ORAL at 22:38

## 2023-02-28 RX ADMIN — INSULIN LISPRO 4 UNITS: 100 INJECTION, SOLUTION INTRAVENOUS; SUBCUTANEOUS at 09:00

## 2023-02-28 RX ADMIN — COLLAGENASE SANTYL: 250 OINTMENT TOPICAL at 09:19

## 2023-02-28 RX ADMIN — SIMETHICONE 80 MG: 80 TABLET, CHEWABLE ORAL at 16:35

## 2023-02-28 RX ADMIN — DULOXETINE HYDROCHLORIDE 30 MG: 30 CAPSULE, DELAYED RELEASE ORAL at 09:00

## 2023-02-28 RX ADMIN — OXYCODONE HYDROCHLORIDE 5 MG: 5 TABLET ORAL at 22:38

## 2023-02-28 RX ADMIN — SIMETHICONE 80 MG: 80 TABLET, CHEWABLE ORAL at 11:55

## 2023-02-28 RX ADMIN — ASPIRIN 81 MG CHEWABLE TABLET 81 MG: 81 TABLET CHEWABLE at 09:00

## 2023-02-28 RX ADMIN — INSULIN LISPRO 2 UNITS: 100 INJECTION, SOLUTION INTRAVENOUS; SUBCUTANEOUS at 12:32

## 2023-02-28 RX ADMIN — ATORVASTATIN CALCIUM 40 MG: 40 TABLET, FILM COATED ORAL at 09:00

## 2023-02-28 RX ADMIN — MELATONIN 6 MG: at 22:38

## 2023-02-28 RX ADMIN — ENOXAPARIN SODIUM 40 MG: 40 INJECTION SUBCUTANEOUS at 09:00

## 2023-02-28 RX ADMIN — PANTOPRAZOLE SODIUM 40 MG: 40 TABLET, DELAYED RELEASE ORAL at 16:35

## 2023-02-28 RX ADMIN — PANTOPRAZOLE SODIUM 40 MG: 40 TABLET, DELAYED RELEASE ORAL at 06:25

## 2023-02-28 NOTE — PLAN OF CARE
Reviewed    Problem: PAIN - ADULT  Goal: Verbalizes/displays adequate comfort level or baseline comfort level  Description: Interventions:  - Encourage patient to monitor pain and request assistance  - Assess pain using appropriate pain scale  - Administer analgesics based on type and severity of pain and evaluate response  - Implement non-pharmacological measures as appropriate and evaluate response  - Consider cultural and social influences on pain and pain management  - Notify physician/advanced practitioner if interventions unsuccessful or patient reports new pain  Outcome: Progressing     Problem: INFECTION - ADULT  Goal: Absence or prevention of progression during hospitalization  Description: INTERVENTIONS:  - Assess and monitor for signs and symptoms of infection  - Monitor lab/diagnostic results  - Monitor all insertion sites, i e  indwelling lines, tubes, and drains  - Monitor endotracheal if appropriate and nasal secretions for changes in amount and color  - [place] appropriate cooling/warming therapies per order  - Administer medications as ordered  - Instruct and encourage patient and family to use good hand hygiene technique  - Identify and instruct in appropriate isolation precautions for identified infection/condition  Outcome: Progressing  Goal: Absence of fever/infection during neutropenic period  Description: INTERVENTIONS:  - Monitor WBC    Outcome: Progressing     Problem: SAFETY ADULT  Goal: Patient will remain free of falls  Description: INTERVENTIONS:  - Educate patient/family on patient safety including physical limitations  - Instruct patient to call for assistance with activity   - Consult OT/PT to assist with strengthening/mobility   - Keep Call bell within reach  - Keep bed low and locked with side rails adjusted as appropriate  - Keep care items and personal belongings within reach  - Initiate and maintain comfort rounds  - Make Fall Risk Sign visible to staff  - Offer Toileting every 4 Hours, in advance of need  - Apply yellow socks and bracelet for high fall risk patients  - Consider moving patient to room near nurses station  Outcome: Progressing  Goal: Maintain or return to baseline ADL function  Description: INTERVENTIONS:  -  Assess patient's ability to carry out ADLs; assess patient's baseline for ADL function and identify physical deficits which impact ability to perform ADLs (bathing, care of mouth/teeth, toileting, grooming, dressing, etc )  - Assess/evaluate cause of self-care deficits   - Assess range of motion  - Assess patient's mobility; develop plan if impaired  - Assess patient's need for assistive devices and provide as appropriate  - Encourage maximum independence but intervene and supervise when necessary  - Involve family in performance of ADLs  - Assess for home care needs following discharge   - Consider OT consult to assist with ADL evaluation and planning for discharge  - Provide patient education as appropriate  Outcome: Progressing  Goal: Maintains/Returns to pre admission functional level  Description: INTERVENTIONS:  - Set and communicate daily mobility goal to care team and patient/family/caregiver     - Collaborate with rehabilitation services on mobility goals if consulted  - Out of bed for toileting  - Record patient progress and toleration of activity level   Outcome: Progressing     Problem: DISCHARGE PLANNING  Goal: Discharge to home or other facility with appropriate resources  Description: INTERVENTIONS:  - Identify barriers to discharge w/patient and caregiver  - Arrange for needed discharge resources and transportation as appropriate  - Identify discharge learning needs (meds, wound care, etc )  - Arrange for interpretive services to assist at discharge as needed  - Refer to Case Management Department for coordinating discharge planning if the patient needs post-hospital services based on physician/advanced practitioner order or complex needs related to functional status, cognitive ability, or social support system  Outcome: Progressing     Problem: Prexisting or High Potential for Compromised Skin Integrity  Goal: Skin integrity is maintained or improved  Description: INTERVENTIONS:  - Identify patients at risk for skin breakdown  - Assess and monitor skin integrity  - Assess and monitor nutrition and hydration status  - Monitor labs   - Assess for incontinence   - Turn and reposition patient  - Assist with mobility/ambulation  - Relieve pressure over bony prominences  - Avoid friction and shearing  - Provide appropriate hygiene as needed including keeping skin clean and dry  - Evaluate need for skin moisturizer/barrier cream  - Collaborate with interdisciplinary team   - Patient/family teaching  - Consider wound care consult   Outcome: Progressing     Problem: Nutrition/Hydration-ADULT  Goal: Nutrient/Hydration intake appropriate for improving, restoring or maintaining nutritional needs  Description: Monitor and assess patient's nutrition/hydration status for malnutrition  Collaborate with interdisciplinary team and initiate plan and interventions as ordered  Monitor patient's weight and dietary intake as ordered or per policy  Utilize nutrition screening tool and intervene as necessary  Determine patient's food preferences and provide high-protein, high-caloric foods as appropriate       INTERVENTIONS:  - Monitor oral intake, urinary output, labs, and treatment plans  - Assess nutrition and hydration status and recommend course of action  - Evaluate amount of meals eaten  - Assist patient with eating if necessary   - Allow adequate time for meals  - Recommend/ encourage appropriate diets, oral nutritional supplements, and vitamin/mineral supplements  - Order, calculate, and assess calorie counts as needed  - Recommend, monitor, and adjust tube feedings and TPN/PPN based on assessed needs  - Assess need for intravenous fluids  - Provide specific nutrition/hydration education as appropriate  - Include patient/family/caregiver in decisions related to nutrition  Outcome: Progressing     Problem: MOBILITY - ADULT  Goal: Maintain or return to baseline ADL function  Description: INTERVENTIONS:  -  Assess patient's ability to carry out ADLs; assess patient's baseline for ADL function and identify physical deficits which impact ability to perform ADLs (bathing, care of mouth/teeth, toileting, grooming, dressing, etc )  - Assess/evaluate cause of self-care deficits   - Assess range of motion  - Assess patient's mobility; develop plan if impaired  - Assess patient's need for assistive devices and provide as appropriate  - Encourage maximum independence but intervene and supervise when necessary  - Involve family in performance of ADLs  - Assess for home care needs following discharge   - Consider OT consult to assist with ADL evaluation and planning for discharge  - Provide patient education as appropriate  Outcome: Progressing  Goal: Maintains/Returns to pre admission functional level  Description: INTERVENTIONS:  - Set and communicate daily mobility goal to care team and patient/family/caregiver     - Collaborate with rehabilitation services on mobility goals if consulted  - Out of bed for toileting  - Record patient progress and toleration of activity level   Outcome: Progressing

## 2023-02-28 NOTE — PROGRESS NOTES
02/28/23 0830   Pain Assessment   Pain Assessment Tool 0-10   Pain Score No Pain   Restrictions/Precautions   Precautions Fall Risk;Contact/isolation;Multiple lines;Pain;Supervision on toilet/commode   Weight Bearing Restrictions No   ROM Restrictions No   Braces or Orthoses Other (Comment)  (TEDS)   Cognition   Overall Cognitive Status WFL   Arousal/Participation Alert; Cooperative   Attention Within functional limits   Orientation Level Oriented X4   Memory Within functional limits   Following Commands Follows all commands and directions without difficulty   Subjective   Subjective Pt agreeable to participate in skilled PT session   Sit to Stand   Type of Assistance Needed Physical assistance   Physical Assistance Level 25% or less   Sit to Stand CARE Score 3   Bed-Chair Transfer   Type of Assistance Needed Physical assistance   Physical Assistance Level 25% or less   Chair/Bed-to-Chair Transfer CARE Score 3   Transfer Bed/Chair/Wheelchair   Adaptive Equipment Roller Walker   Walk 10 Feet   Type of Assistance Needed Physical assistance   Physical Assistance Level 26%-50%   Comment HHA   Walk 10 Feet CARE Score 3   Walk 50 Feet with Two Turns   Type of Assistance Needed Physical assistance   Physical Assistance Level 26%-50%   Comment HHA   Walk 50 Feet with Two Turns CARE Score 3   Walk 150 Feet   Comment fatigued at 135'   Ambulation   Primary Mode of Locomotion Prior to Admission Walk   Distance Walked (feet) 135 ft   Assist Device Hand Hold   Findings short term goal of pt is to amb from room to PT gym (163')   Does the patient walk? 2  Yes   Therapeutic Interventions   Flexibility manual HS & Gastroc stretch [id number]   Assessment   Treatment Assessment Pt engaged in 30 min skilled PT session focused on inc endurance & amb distance  Donned pt TEDS at start of session for edema  Pt BP at start of session was 128/71, HR 117bpm  Performed manual stretching to BLE to inc flexibility   Pt amb 60' ModA using HR with light touch  Pt amb 135' HHA before fatiguing to inc endurance  Pt short term goal to amb distance from room to PT gym (163')  Pt continues to be limited by decreased strength & endurance  In next session continue to amb to inc endurance  Family/Caregiver Present no   Problem List Decreased strength;Decreased range of motion;Decreased endurance; Impaired balance;Decreased mobility;Pain   Barriers to Discharge Inaccessible home environment;Decreased caregiver support   PT Barriers   Functional Limitation Car transfers; Ramp negotiation;Stair negotiation;Standing;Transfers; Walking   Plan   Treatment/Interventions ADL retraining;Functional transfer training; Therapeutic exercise; Endurance training;Equipment eval/education;Patient/family training;Bed mobility; Compensatory technique education   Progress Progressing toward goals   Recommendation   PT Discharge Recommendation Home with home health rehabilitation   PT Therapy Minutes   PT Time In 0830   PT Time Out 0900   PT Total Time (minutes) 30   PT Mode of treatment - Individual (minutes) 30   PT Mode of treatment - Concurrent (minutes) 0   PT Mode of treatment - Group (minutes) 0   PT Mode of treatment - Co-treat (minutes) 0   PT Mode of Treatment - Total time(minutes) 30 minutes   PT Cumulative Minutes 250   Therapy Time missed   Time missed?  No

## 2023-02-28 NOTE — PROGRESS NOTES
PM&R PROGRESS NOTE:  Beth Cooley 61 y o  male MRN: [id number]  Unit/Bed#: -24 Encounter: [id number]        Rehabilitation Diagnosis: Impairment of mobility, safety and Activities of Daily Living (ADLs) due to Debility:  16  Debility (Non-cardiac/Non-pulmonary)    HPI: Jacinta Landry is a 61 y o  male with a history of hyperlipidemia, hypertension, GERD, ventral hernia, colon adenocarcinoma that initially presented to [facility] for an elective surgical procedure with Dr Steve Jiménez on 11/7/22  Patient presented to hem/onc on  9/22 when CT abdomen revealed transverse colonic apple core lesion and innumerable hepatic metastases  MRI revealed multiple hepati metastasis with smaller lesions in left lobe and larger lesions on the right lobe  On 11/7, patient underwent exploratory laparotomy, segment 3 liver resection, ablation, intraoperative ultrasound of the liver  This was complicated by left upper quadrant hematoma, imaging showed suspected leak from transverse colon anastomosis  On 11/16, patient underwent exploratory lap revealing hematoma, defect in transverse colon anastomosis with extravasation of succus and a dilated cecum requiring resection  He had a right hemicolectomy, left in discontinuity and temporary abdominal closure device  On 11/17, patient underwent re-exploratory, partial descending colectomy, primary colonic anastomosis created with diverting loop ileostomy and midline wound vac placement  An intra-abdominal abscess was positive for ESBL and patient completed prolonged course of antibiotics  He was discharged to SNF on 12/3 and returned to hospital on 12/4 for increased abdominal pain  Patient monitored off of antibiotics  On 12/6 patient was noted with an increased creatinine level, received IVF bolus with improvement  Patient developed tachycardia, increased abdominal pain, and incision with yellow drainage noted  CT AP showed abdominal abscess and distended gallbladder   ID was consulted, patient was continued on IV antibiotics through 12/15  Patient went to IR for percutaneous cholecystostomy tube insertion and hepatectomy abscess drainage  Nephrology consulted and initiated sodium bicarb gtt and IV albumin  On 12/14, patient midline/left chest wall pain, below abscess drain  Cardiac work-up completed  Troponin level 57 and CXR showed progressive bilateral opacities suspicious for CHF and small left pleural effusion  Cariology consulted with chest pain appearing musculoskeletal no further work up warranted  Patient was deemed medically stable and admitted to [address] [place] [address] on 12/14/22  He was discharged from [facility] on 01/17/23  Patient present to [facility] on 02/07 with increased weakness for two weeks with 2 falls noted  No injury noted  He was found to be septic with the main source of midline abdominal wound which was with foul smelling drainage  CT showed improvement in fluid collection at the left liver segment, perisplenic, left retroperitoneum, interior to the spleen  Patient was treated with broad spectrum antibiotics and eventually placed on Ertapenem  Hospital course complicated by fevers and leukocytosis  Repeat imaging concerning for cholecystitis  Patient underwent cystostomy tube exchange and Pattock drain was removed on 02/24  The patient was evaluated by the Rehabilitation team and deemed an appropriate candidate for an inpatient acute rehabilitation program  Patient was admitted on 02/24/23         SUBJECTIVE: Patient seen face to face  No acute issues overnight  Reports fatigue, depending on day  Has expressed understanding of this being new normal  Accepting of diagnosis Abdominal tenderness at per radha insertions site, manageable and improving  Cholecystostomy tube with creamy green drainage, ileostomy with tan loose stool  Midline wound pink healing with small scattered slough  Voiding   Denies chest pain, shortness of breath, fever, chills, nausea    24 hr total  Cholecystostomy tube- 10 cc  Ileostomy- 525 cc     ASSESSMENT: Stable, progressing    PLAN:  - leukocytosis 13 07, previous 13 41; Tmax 99 4, no signs of infection, ID following, monitor closely off antibiotics  - abdominal tenderness at per chol drain insertion site, since tube exchange, no signs of infection, improving, continue to monitor  -wound care: continue daily dressing changes to midline incision  -hypotension: orthostatic hypotension noted on 02/27; normotensive today  Continue to monitor BP    Rehabilitation  • Functional deficits:  Mobility, self care  • Continue current rehabilitation plan of care to maximize function  • Functional update:   o PT: mobility- supervision, transfers-min A, ambulation- mod A 35'  o OT:  ADL- bathing: min A (sponge bath), dressing supervision-incidental touching  • Estimated Discharge: anticipated 10-14 days    Pain  • Tylenol, oxycodone    DVT prophylaxis  • Lovenox    Bladder plan  • Continent    Bowel plan  • Ileostomy    Malignant neoplasm of transverse colon [facility])  Assessment & Plan  Complex history with chronological details below:    2/22: S/P diverting colostomy, liver biopsy +Chemo tx  11/7/22: Hepatic resection and colostomy reversal  11/16/22: Exploratory laparotomy with bowel resection and VAC placement   11/17/22: Right hemicolectomy, partial descending colon resection, colocolonic anastomosis with loop ileosotmy and midline VAC placement  12/20/22: Drains placed, total of 3 drains + perc radha tube + ileostomy  1/31/23: 2 of 3 drains removed  This hospital stay had fever/leukocytosis/sepsis, started Meropenem and then to Vancomycin and Ertapenem  Wound cx again = Ecoli ESBL  LD Ertapenem 2/14, Vancomycin 2/16 2/24: Abscessogram of midline drain showed minimal persistent collection and the midline catheter was removed   Perc Radha tube exchanged  • Patient does not wish to follow with [place] [facility] anymore as it is too far of a drive; would like to establish with [place] [facility] (already follows with Dr Nevaeh Jimenez)  • Follows with Dr Jose G Buitrago as outpt    * Sepsis [facility])  Assessment & Plan  Noted the development of foul-smelling drainage and increasing pain from his chronic abdominal wound with leukocytosis, tachypnea, tachycardia  • Purulent drainage from abdominal wound  • Hx of colon CA w/ extensive abd surgical- adenocarcinoma of transverse colon section  • Patient has several abdominal drains in place, with CT showing improvement in the fluid collections at the left liver lobe, perisplenic area, and the left retroperitoneum inferior to the spleen  • Completed Vancomycin and Ertapenem  • S/p bedside midline wound debridement + wet dressing 2/8 with general surgery  • S/p IR tube check 2/17/2023, drains kept in place  Discussed with IR, follow up in 1 month  • Given ongoing low grade fever and worsening leukocytosis, ordered repeat blood cultures and repeat CT a/p    CT a/p showing moderate R pleural effusion, imaging concerning for cholecystitis  Discussed case with surgery, IR  IR will reposition drain today- Alvina tube check showed retraction of the alvina tube   Interval occlusion of the cystic duct   Dino-enteric fistula persisted  Exchange of alvina tube 2/24  • Continue to monitor off of antibiotics, low threshold to re-consult ID, but stable at this time  • Debility from Sepsis in addition to 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  • Pain control, anxiety management  • PT/OT    Chronic bilateral pleural effusions  Assessment & Plan  Hx of pleural effusions with   Interval worsening of moderate right and small left pleural effusions  There is associated bibasilar atelectasis based on CTAP on 2/22    Monitor oxygen and breathing, may benefit from thoracentesis    Depression  Assessment & Plan  Continue Cymbalta  Provide supportive counseling and encouragement, easily tearful  Neuropsychology consultation for support    Cholecystitis, unspecified  Assessment & Plan  Subacute at this point, but still requiring per radha tube  • s/p percutaneous tube placement 12/8/22 asnd recent exchange on 2/24/23  • CT scan was concerning for cholecystitis --> to IR 2/24 = showed retraction of the radha tube and interval occlusion of the cystic duct   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  Radha tube was exchanged  • Monitor output and pain    Abdominal wound dehiscence  Assessment & Plan  Midline Abdominal wound with initially fould smelling drainage  Now improved per patient with red granular base and slough noted  Snatyl and wet to dry dressing was done in acute care  Consult wound care for eval and recs    Nephrolithiasis  Assessment & Plan  RUQ US revealed 7 mm nonobstructing right intrarenal calculus  No hydronephrosis noted on CT a/p  Asymptomatic at this time  • Flomax  • Monitor outpt, symptoms    Hyponatremia  Assessment & Plan  • Mild, most recently 131 on 2/27  • Dexter likely2/2 SIADH in setting of malignancy  • Sodium chloride tab 1 with breakfast  • Monitor BMP    Elevated alkaline phosphatase level  Assessment & Plan  Chronically elevated likely in setting of known hepatic metastasis  • RUQ US Decompressed gallbladder around a cholecystostomy tube, limiting evaluation  Hepatomegaly  Heterogeneous echotexture of the liver in this patient with known metastatic disease  7 mm nonobstructing right intrarenal calculus  • Monitor with am CMP    Leukocytosis  Assessment & Plan  WBC 13 07 (previous 13 41)  Mildly above normal, trendiong down, however has a history of spiking and dropping  Monitor labs however need to follow clinical exam and vitals as it may spike and drop again  Monitor off antibiotics  ID following  If Temp >101 blood cultures and consider CT A/P    Abscess  Assessment & Plan  Smaller abscesses on recent studies now with no active drains  At risk for further complications    Lower theshold for abdominal CT if develops fevers or leukocytosis    Acute on chronic kidney failure [facility])  Assessment & Plan  Lab Results   Component Value Date    EGFR 56 02/24/2023    EGFR 52 02/23/2023    EGFR 52 02/22/2023    CREATININE 1 36 (H) 02/24/2023    CREATININE 1 45 (H) 02/23/2023    CREATININE 1 45 (H) 02/22/2023     • POA with creatinine 2 52; baseline 1-1 3  • Suspecting possible multifactorial cause in setting of dehydration and sepsis  • Improving back into baseline levels, Continue to monitor on labs, minimize any relative hypotension  Avoid nephrotoxic agents  Anemia  Assessment & Plan  Hbg 8 3 (previous 8 7, 7 2, 8 1)  Required transfusion on 2/16  Order type and screen, 1 unit pRBC (2/26)  Monitor CBC    Mixed hyperlipidemia  Assessment & Plan  Continue statin    Benign essential hypertension  Assessment & Plan  Monitor pressures in therapy    Type 2 diabetes mellitus without complication, with long-term current use of insulin [facility])  Assessment & Plan  Lab Results   Component Value Date    HGBA1C 8 0 (H) 09/26/2022       Recent Labs     02/24/23  0839 02/24/23  1137 02/24/23  1626 02/24/23  2040   POCGLU 106 105 152* 120     • Home:  Lantus 8U qam/Lispro 4U TID  • Here:  Lantus 5U qhs/Lispro 4U TID  • Has a DEXCOM meter and a regular meter at home  • Continue DM diet and QID Accuchecks/SSI    Appreciate IM consultants medical co-management  Labs, medications, and imaging reviewed  ROS:  Review of Systems   A 10 point review of systems was negative except for what is noted in the HPI  OBJECTIVE:   /71 (BP Location: Left arm)   Pulse 77   Temp 98 °F (36 7 °C) (Oral)   Resp 16   Ht 5' 9" (1 753 m)   Wt 81 5 kg (179 lb 9 6 oz)   SpO2 96%   BMI 26 52 kg/m²     Physical Exam  Constitutional:       Appearance: Normal appearance  HENT:      Head: Normocephalic and atraumatic  Mouth/Throat:      Mouth: Mucous membranes are moist    Cardiovascular:      Rate and Rhythm: Normal rate and regular rhythm        Pulses: Normal pulses  Heart sounds: Normal heart sounds  Pulmonary:      Effort: Pulmonary effort is normal       Breath sounds: Normal breath sounds  Abdominal:      General: Bowel sounds are normal       Palpations: Abdomen is soft  Tenderness: There is abdominal tenderness  Comments: +ileostomy, +perc radha drain   Musculoskeletal:         General: Normal range of motion  Cervical back: Normal range of motion  Skin:     General: Skin is warm and dry  Capillary Refill: Capillary refill takes less than 2 seconds  Comments: Midline incision pink, small scattered slough throughout   Neurological:      Mental Status: He is alert and oriented to person, place, and time     Psychiatric:         Mood and Affect: Mood normal          Judgment: Judgment normal         Lab Results   Component Value Date    WBC 13 07 (H) 02/28/2023    HGB 8 3 (L) 02/28/2023    HCT 26 6 (L) 02/28/2023    MCV 84 02/28/2023     02/28/2023     Lab Results   Component Value Date    SODIUM 131 (L) 02/27/2023    K 4 1 02/27/2023     02/27/2023    CO2 19 (L) 02/27/2023    BUN 16 02/27/2023    CREATININE 1 18 02/27/2023    GLUC 121 02/27/2023    CALCIUM 8 2 (L) 02/27/2023     Lab Results   Component Value Date    INR 1 32 (H) 02/07/2023    INR 1 12 12/19/2022    INR 1 10 11/12/2022    PROTIME 16 1 (H) 02/07/2023    PROTIME 14 7 (H) 12/19/2022    PROTIME 14 4 11/12/2022       Current Facility-Administered Medications:   •  acetaminophen (TYLENOL) tablet 650 mg, 650 mg, Oral, Q4H PRN, Estevan Maldonado DO, 650 mg at 02/25/23 2115  •  aspirin chewable tablet 81 mg, 81 mg, Oral, Daily, Estevan Maldonado DO, 81 mg at 02/28/23 0900  •  atorvastatin (LIPITOR) tablet 40 mg, 40 mg, Oral, Daily, Estevan Maldonado DO, 40 mg at 02/28/23 0900  •  collagenase (SANTYL) ointment, , Topical, Daily, Estevan Maldonado DO, Given at 02/28/23 0919  •  DULoxetine (CYMBALTA) delayed release capsule 30 mg, 30 mg, Oral, Daily, Estevan Maldonado DO, 30 mg at 02/28/23 0900  •  enoxaparin (LOVENOX) subcutaneous injection 40 mg, 40 mg, Subcutaneous, Q24H SHANNON, GAURANG Keith, 40 mg at 02/28/23 0900  •  hydrOXYzine HCL (ATARAX) tablet 25 mg, 25 mg, Oral, HS, Haley eD, CRNP, 25 mg at 02/27/23 2223  •  insulin glargine (LANTUS) subcutaneous injection 5 Units 0 05 mL, 5 Units, Subcutaneous, HS, Forrestine Nettle, DO, 5 Units at 02/27/23 2222  •  insulin lispro (HumaLOG) 100 units/mL subcutaneous injection 1-6 Units, 1-6 Units, Subcutaneous, TID AC, 1 Units at 02/27/23 1220 **AND** Fingerstick Glucose (POCT), , , 4x Daily AC and at bedtime, Forrestine Nettle, DO  •  insulin lispro (HumaLOG) 100 units/mL subcutaneous injection 1-6 Units, 1-6 Units, Subcutaneous, HS, Forrestine Nettle, DO, 1 Units at 02/26/23 2153  •  insulin lispro (HumaLOG) 100 units/mL subcutaneous injection 4 Units, 4 Units, Subcutaneous, TID With Meals, Forrestine Nettle, DO, 4 Units at 02/28/23 0900  •  melatonin tablet 6 mg, 6 mg, Oral, HS PRN, Forrestine Nettle, DO, 6 mg at 02/27/23 2223  •  methocarbamol (ROBAXIN) tablet 500 mg, 500 mg, Oral, BID PRN, Forrestine Nettle, DO, 500 mg at 02/24/23 2120  •  ondansetron (ZOFRAN) injection 4 mg, 4 mg, Intravenous, Q4H PRN, Forrestine Nettle, DO  •  oxyCODONE (ROXICODONE) IR tablet 2 5 mg, 2 5 mg, Oral, Q4H PRN, Forrestine Nettle, DO  •  oxyCODONE (ROXICODONE) IR tablet 5 mg, 5 mg, Oral, Q4H PRN, Forrestine Nettle, DO, 5 mg at 02/27/23 2223  •  pantoprazole (PROTONIX) EC tablet 40 mg, 40 mg, Oral, BID AC, Forrestine Nettle, DO, 40 mg at 02/28/23 8882  •  simethicone (MYLICON) chewable tablet 80 mg, 80 mg, Oral, 4x Daily (with meals and at bedtime), Forrestine Nettle, DO, 80 mg at 02/28/23 0900  •  sodium chloride tablet 1 g, 1 g, Oral, Daily Before Breakfast, Dain Alpers Harleman, CRNP, 1 g at 02/28/23 9174  •  tamsulosin (FLOMAX) capsule 0 4 mg, 0 4 mg, Oral, Daily With Clorinda [place], DO, 0 4 mg at 02/27/23 0147    Past Medical History:   Diagnosis Date   • Abdominal pain 03/12/2022   • Acute renal failure ([place] [address] )     [id number] resolved   • Acute respiratory failure with hypoxia ([place] [address] ) 11/12/2022   • Bacteremia 11/12/2022   • Cancer ([place] [address] )    • Diabetes mellitus ([place] [address] )    • Enteritis 08/23/2016   • Flash pulmonary edema ([place] [address] ) 11/12/2022   • Gastroparesis due to DM ([place] [address] ) 08/23/2016   • GERD (gastroesophageal reflux disease)    • Hernia, ventral 08/04/2016   • Hyperlipidemia    • Hypertension    • Morbid obesity ([place] [address] ) 04/17/2018   • Postoperative visit 03/02/2022   • SIRS (systemic inflammatory response syndrome) ([place] [address] ) 03/12/2022   • Snoring    • Stage 3a chronic kidney disease ([place] [address] ) 02/19/2022       Patient Active Problem List    Diagnosis Date Noted   • Malignant neoplasm of transverse colon ([place] [address] ) 03/01/2022   • Sepsis ([place] [address] ) 12/17/2022   • 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 liver ([place] [address] ) 03/02/2022   • Abdominal wound dehiscence 02/24/2023   • Cholecystitis, unspecified 02/24/2023   • Depression 02/24/2023   • Chronic bilateral pleural effusions 02/24/2023   • Nephrolithiasis 02/09/2023   • Abnormal CT scan 02/07/2023   • Hyponatremia 02/07/2023   • Dehiscence of incision 12/30/2022   • Elevated alkaline phosphatase level 12/30/2022   • Leukocytosis 12/29/2022   • Abscess 12/27/2022   • Acute pain 12/27/2022   • Severe protein-calorie malnutrition ([place] [address] ) 12/14/2022   • Acute on chronic kidney failure ([place] [address] ) 12/14/2022   • MR (mitral regurgitation) 11/12/2022   • ESBL (extended spectrum beta-lactamase) producing bacteria infection 11/12/2022   • Encephalopathy 11/09/2022   • Cervical radiculopathy 10/26/2022   • Other fatigue 06/15/2022   • Colostomy prolapse ([place] [address] ) 05/27/2022   • Colon cancer metastasized to liver ([place] [address] ) 03/12/2022   • Iron deficiency anemia, unspecified 03/01/2022   • Thrombocytosis 02/21/2022   • Hypokalemia 02/19/2022   • Transaminitis 02/19/2022   • Type 2 diabetes mellitus with hyperlipidemia ([facility] ) 02/18/2022   • Anemia 02/18/2022   • Left ureteral calculus 01/30/2020   • Incarcerated umbilical hernia 29/08/4340   • Testicular hypogonadism 06/19/2017   • Low testosterone 05/30/2017   • Type 2 diabetes mellitus without complication, with long-term current use of insulin ([facility] ) 08/23/2016   • Benign essential hypertension 08/23/2016   • Mixed hyperlipidemia 08/23/2016   • Erectile dysfunction 07/11/2016   • Obesity (BMI 30-39 9) 07/11/2016          GAURANG Costa  Physical Medicine and Chris Luu    Total visit time: 35 minutes, with more than 50% spent counseling/coordinating care  Counseling includes discussion with patient re: progress in therapies, functional issues observed by therapy staff, and discussion with patient regarding their current medical state and wellbeing  Coordination of patient's care was performed in conjunction with Internal Medicine service to monitor patient's labs, vitals, and management of their comorbidities

## 2023-02-28 NOTE — PROGRESS NOTES
02/28/23 1400   Pain Assessment   Pain Assessment Tool 0-10   Pain Score No Pain   Restrictions/Precautions   Precautions Fall Risk;Contact/isolation;Supervision on toilet/commode;Multiple lines   Braces or Orthoses   (TEDs)   Cognition   Overall Cognitive Status WFL   Subjective   Subjective pt reports being tired but agreeable to work to get better   Sit to Lying   Type of Assistance Needed Supervision   Sit to Lying CARE Score 4   Sit to Stand   Type of Assistance Needed Incidental touching   Sit to Stand CARE Score 4   Bed-Chair Transfer   Type of Assistance Needed Physical assistance   Physical Assistance Level 25% or less   Comment CG/Ada no device   Chair/Bed-to-Chair Transfer CARE Score 3   Transfer Bed/Chair/Wheelchair   Findings pt working without AD during session   Walk 10 Feet   Type of Assistance Needed Physical assistance   Physical Assistance Level 25% or less   Comment HHA   Walk 10 Feet CARE Score 3   Walk 50 Feet with Two Turns   Type of Assistance Needed Physical assistance   Physical Assistance Level 25% or less   Comment HHA   Walk 50 Feet with Two Turns CARE Score 3   Walk 150 Feet   Reason if not Attempted Safety concerns   Walk 150 Feet CARE Score 88   Ambulation   Distance Walked (feet) 40 ft  (x6)   Assist Device Hand Hold   Gait Pattern Inconsistant Josefina; Slow Josefina; Improper weight shift   Limitations Noted In Balance; Endurance;Speed;Strength   Provided Assistance with: Balance   Findings worked on short, household distances repeatedly with less time with rest breaks to improve endurance   had pt improve gait speed slightly for balance and stability  HHa and just with hands on patient's hips   Curb or Single Stair   Style negotiated Single stair   Type of Assistance Needed Incidental touching; Adaptive equipment   Comment B HRs   1 Step (Curb) CARE Score 4   4 Steps   Type of Assistance Needed Physical assistance; Adaptive equipment   Physical Assistance Level 25% or less   Comment B HRs   4 Steps CARE Score 3   12 Steps   Reason if not Attempted Safety concerns   12 Steps CARE Score 88   Stairs   # of Steps 6   Assist Devices Bilateral Rail   Findings step to pattern and descending bwd for safety  may want to trial  steps in doorway to mimic holding door frame when using stairs   Therapeutic Interventions   Strengthening seated LAQ, hip flex 2 sets until fatigued (~ 20-30 each set) B/L iwth 2 5#   Flexibility B heel cord and HS x5mins   Assessment   Treatment Assessment pt tolerated tx well with cont focus on gait with no AD and improving endurance and speed  pt able to perform but does fatigue quickly  pt felt dizzy initially upon standing, /80, and after performing stairs  pt reports 1 5hr sessions are challenging and can perform better with hour block sessions  pt to cont with household distance amb to improve endurance iwth repetition and stairs to safely function at home with decrease burden of care and without AD if safe for d/c  Problem List Decreased strength;Decreased range of motion;Decreased endurance; Impaired balance;Decreased mobility;Pain   Barriers to Discharge Inaccessible home environment;Decreased caregiver support   PT Barriers   Functional Limitation Car transfers; Ramp negotiation;Stair negotiation;Standing;Transfers; Walking   Plan   Progress Progressing toward goals   Recommendation   PT Discharge Recommendation Home with home health rehabilitation   Equipment Recommended   (LRAD)   PT Therapy Minutes   PT Time In 1400   PT Time Out 1530   PT Total Time (minutes) 90   PT Mode of treatment - Individual (minutes) 90   PT Mode of treatment - Concurrent (minutes) 0   PT Mode of treatment - Group (minutes) 0   PT Mode of treatment - Co-treat (minutes) 0   PT Mode of Treatment - Total time(minutes) 90 minutes   PT Cumulative Minutes 340   Therapy Time missed   Time missed?  No

## 2023-02-28 NOTE — PCC PHYSICAL THERAPY
Pt moving slow but much better in skilled PT this time   Pt will cont to need strengthening and conditioning to meet DC goals   Lowell Miranda Pt is a good rehab candidate with ELOS 2 weeks and goals set at [place]  POC to focus on gait training, transfer training, stair training, neuromuscular re-education, improving pt's activity tolerance and endurance, LE strengthening, balance interventions and assessment for appropriate AD  Cont POC toward DC goals     3/7/23  Pt is making fair progress towards LTGs  Although pt would not like to use RW at d/c recommending for pt safety but if family present and mobilizing with pt and pt feels well can use SPC in home  Recommending FF s/u upon d/c as pt inconsistent with stair trng due to ongoing fatigue and pain, recommend pt cont to work on stairs with home PT and place chairs on landings for pt safety  Pt has HEP from previous stay and will cont with this upon d/c  No DME needs at this time  Pending medical stability d/c planned for 3/10/23 with recommendations for home PT to further improve pt's balance, endurance, and strength to maximize his independence with LRAD and reduce risk for falls as well as caregiver burden

## 2023-02-28 NOTE — PROGRESS NOTES
02/28/23 1101   Pain Assessment   Pain Assessment Tool 0-10   Pain Score No Pain   Restrictions/Precautions   Precautions Fall Risk;Contact/isolation;Multiple lines;Supervision on toilet/commode   Exercise Tools   Other Exercise Tool 1 Chair Yoga: Pt engaged in chair yoga exercises to promote overall cardiovascular endurance, UB endurance, breathing techniques and fxnl coordination  Pt performed UB, LB and corodination exercises from seated position with unilateral and bilateral demands, grading as tolerated   Assessment   Treatment Assessment Pt engaged in skilled OT tx session with focusing on fxnl endurance, UB exercises/strengthening, HEP development  Pt provided with schedule to complete seated chair yoga daily to maximize endurance and overall rehab potential  Pt able to follow UB exercises and coordination exercises with direction  continue to addrss and grade exercises as tolerated  OT Therapy Minutes   OT Time In 1100   OT Time Out 1200   OT Total Time (minutes) 60   OT Mode of treatment - Individual (minutes) 60   OT Mode of treatment - Concurrent (minutes) 0   OT Mode of treatment - Group (minutes) 0   OT Mode of treatment - Co-treat (minutes) 0   OT Mode of Treatment - Total time(minutes) 60 minutes   OT Cumulative Minutes 240   Therapy Time missed   Time missed?  No

## 2023-02-28 NOTE — PROGRESS NOTES
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  • s/p diverting loop colostomy/liver biopsy with subsequent chemotherapy 2/2022  • s/p hepatic resection and reversal of colostomy on 11/7/22  • s/p ex-lap with bowel resection/VAC placement 11/16/22  • s/p right hemicolectomy with partial descending colectomy, colocolonic anastomosis with loop ileostomy and mid line abdominal VAC placement 11/17  • s/p additional drains placed for a perisplenic abscess and splenic abscess 12/20/22 (then had 3 drains plus the already existing perc radha tube)  • The 2 left lateral drains were removed by IR 1/31/23 as an OP  • Had previously been tx'd with Ertapenem  • This hospital stay had fever/leukocytosis/sepsis, started Meropenem and then to Vancomycin and Ertapenem  • Wound cx again = Ecoli ESBL  • LD Ertapenem 2/14, Vancomycin 2/16  • To IR 2/24 = Abscessogram of midline drain showed minimal persistent collection and the midline catheter was removed  • Recent wound debridement by general surgery prior to transfer = continue Santyl qd to wound     Acute cholecystitis  • s/p percutaneous tube placement 12/8/22  • CT scan was concerning for cholecystitis --> to IR 2/24 = showed retraction of the radha tube and interval occlusion of the cystic duct   +Cholecysto enteric fistula   Radha tube was exchanged    • Drainage from perc radha tube was milky light green on 2/27  • Surgery saw 2/27/23 = stable     PORT catheter  • On 2/24/23 in IR = port check showed probable small thrombus at tip of cathter, flushed/improved aspiration of the port     HTN/orthostasis  • D/c'd Norvasc 2 5mg qd  Since BPs were too low to get  • Did have some symptomatic orthostasis on 5/30/69 98 systolic and 2/27/23 with 87/57 standing (102/71 sitting)  • Will continue TEDS; may need a liter NSS if reoccurs      Diabetes mellitus  • Home:  Lantus 8U qam/Lispro 4U TID  • Here:  Lantus 5U qhs/Lispro 4U TID  • Has a DEXCOM meter and a regular meter at home  • Continue DM diet, QID Accuchecks/SSI     ALEXY/CKD III  • Baseline 1 2-1 4  • Creat was 2 5 on admission with a K+ level of 5 9  • Currently creat is 1 18     Anemia  • Transfused in December and January  • Hemoglobin was 7 1 on 2/26 = transfused x 1  • Hemoglobin today is up to 8 7     Situational depression  • Cymbalta      Left pleural effusion  • Thoracentesis 1/12 for 300ml  • Cyto from pleural fluid was negative for malignancy; cx = NG  • CT 2/22/23 = "Interval worsening of moderate right and small left pleural effusions"  • Will watch     Hyponatremia  • Mild  • Felt likely 2/2 SIADH in setting of malignancy  • Down to 131 from 132  • On 2/27/23, added NACL 1 GM qd  • BMP in AM     Malnutrition  • Has Magic cups BID ordered but doesn't like them so will stop  • Prefers premier protein banana flavored shake --> wife to bring in  One bottle has 30 Gm protein and only 4 carbs     Low grade temp  • Was 100 3 on evening of 2/25  • Has mild leukocytosis  • Afebrile since this episode  • ID did see 2/27 = watching for now; recheck CBC in AM and if temp >101 then to check blood cx and consider repeat CT of the abd/pelvis        Discharge date:  Team       The above assessment and plan was reviewed and updated as determined by my evaluation of the patient on 2/28/2023      Labs:   Results from last 7 days   Lab Units 02/28/23  0632 02/27/23  0607   WBC Thousand/uL 13 07* 13 41*   HEMOGLOBIN g/dL 8 3* 8 7*   HEMATOCRIT % 26 6* 27 3*   PLATELETS Thousands/uL 363 355     Results from last 7 days   Lab Units 02/27/23  0607 02/25/23  0552   SODIUM mmol/L 131* 132*   POTASSIUM mmol/L 4 1 3 6   CHLORIDE mmol/L 104 103   CO2 mmol/L 19* 21   BUN mg/dL 16 16   CREATININE mg/dL 1  18 1 28   CALCIUM mg/dL 8 2* 7 8*             Results from last 7 days   Lab Units 02/28/23  1048 02/28/23  0621 02/27/23  2114   POC GLUCOSE mg/dl 219* 123 146*       Review of Scheduled Meds:  Current Facility-Administered Medications   Medication Dose Route Frequency Provider Last Rate   • acetaminophen  650 mg Oral Q4H PRN Forrestine Nettle, DO     • aspirin  81 mg Oral Daily Forrestine Nettle, DO     • atorvastatin  40 mg Oral Daily Forrestine Nettle, DO     • collagenase   Topical Daily Forrestine Nettle, DO     • DULoxetine  30 mg Oral Daily Forrestine Nettle, DO     • enoxaparin  40 mg Subcutaneous Q24H [address] GAURANG Keith     • hydrOXYzine HCL  25 mg Oral HS Ofelia GAURANG Del Toro     • insulin glargine  5 Units Subcutaneous HS Forrestine Nettle, DO     • insulin lispro  1-6 Units Subcutaneous TID AC Forrestine Nettle, DO     • insulin lispro  1-6 Units Subcutaneous HS Forrestine Nettle, DO     • insulin lispro  4 Units Subcutaneous TID With Meals Forrestine Nettle, DO     • melatonin  6 mg Oral HS PRN Forrestine Nettle, DO     • methocarbamol  500 mg Oral BID PRN Forrestine Nettle, DO     • ondansetron  4 mg Intravenous Q4H PRN Forrestine Nettle, DO     • oxyCODONE  2 5 mg Oral Q4H PRN Forrestine Nettle, DO     • oxyCODONE  5 mg Oral Q4H PRN Forrestine Nettle, DO     • pantoprazole  40 mg Oral BID AC Forrestine Nettle, DO     • simethicone  80 mg Oral 4x Daily (with meals and at bedtime) Forrestine Nettle, DO     • sodium chloride  1 g Oral Daily Before Breakfast GAURANG Hutchinson     • tamsulosin  0 4 mg Oral Daily With 1 [facility], DO         Subjective/ HPI: Patient seen and examined  Patients overnight issues or events were reviewed with nursing or staff during rounds or morning huddle session  New or overnight issues include the following:     No new or overnight issies  Offers no complaints  Feels well today  Appetite is good    ROS:   A 10 point ROS was performed; negative except as noted above         Imaging:     No orders to display *Labs /Radiology studies reviewed  *Medications reviewed and reconciled as needed  *Please refer to order section for additional ordered labs studies  *Case discussed with primary attending during morning huddle case rounds    Physical Examination:  Vitals:   Vitals:    02/27/23 1516 02/27/23 2147 02/28/23 0611 02/28/23 0835   BP: 120/80 137/83 150/90 128/71   BP Location: Left arm Right arm Right arm Left arm   Pulse: 93 87 77    Resp: 17 18 16    Temp: 98 7 °F (37 1 °C) 99 4 °F (37 4 °C) 98 °F (36 7 °C)    TempSrc: Oral Oral Oral    SpO2: 99% 98% 96%    Weight:       Height:           General Appearance: no distress, conversive  HEENT: PERRLA, conjuctiva normal; oropharynx clear; mucous membranes moist   Neck:  Supple, normal ROM  Lungs: CTA with dec BS in bases R>L, normal respiratory effort, no retractions, expiratory effort normal  CV: regular rate and rhythm; no rubs/murmurs/gallops, PMI normal   ABD: soft; ND/NT; +BS; perc radha tube draining milky green fluid  Ostomy with yellow-brown watery stool  EXT: no edema  Skin: normal turgor, normal texture, no rashes  Psych: affect normal, mood normal  Neuro: AAO      The above physical exam was reviewed and updated as determined by my evaluation of the patient on 2/28/2023  Invasive Devices     Central Venous Catheter Line  Duration           Port A Cath 03/10/22 Right Chest 355 days          Drain  Duration           Ileostomy  days    Cholecystostomy Tube 4 days                   VTE Pharmacologic Prophylaxis: Enoxaparin  Code Status: Level 1 - Full Code  Current Length of Stay: 4 day(s)      Total time spent:  30 minutes with more than 50% spent counseling/coordinating care  Counseling includes discussion with patient re: progress  and discussion with patient of his/her current medical state/information  Coordination of patient's care was performed in conjunction with primary service   Time invested included review of patient's labs, vitals, and management of their comorbidities with continued monitoring  In addition, this patient was discussed with medical team including physician and advanced extenders  The care of the patient was extensively discussed and appropriate treatment plan was formulated unique for this patient  Medical decision making for the day was made by supervising physician unless otherwise noted in their attestation statement  ** Please Note:  voice to text software may have been used in the creation of this document   Although proof errors in transcription or interpretation are a potential of such software**

## 2023-02-28 NOTE — CASE MANAGEMENT
CM met with patient, explained rehab routine and CM role  Patient familiar with [facility], as he was a patient in January  Patient reported he lives with his wife in [facility] with 2STE  There is half bath on the first floor, and bedrooms on the second floor  Patient needed assistance with ADL's while at home  Patient stated he has a walker and w/c, uses urinal, has ileostomy, so does not use the toilet  Patient stated he is open to [facility] for RN/PT  Patient stated family provides transportation  Patient confirmed his medical insurance as [employer], patient reported he was told by his employer, his insurance will be renewed until next year  Patient confirmed pharmacy preference as [facility]  CM will continue to follow for discharge needs

## 2023-02-28 NOTE — TELEPHONE ENCOUNTER
I arrived at the PT gym at the [facility], as the patient was not in his room, and I re-introduced myself to the patient, as we had spoken on the phone last week  We discussed that his appointment with Dr Carmen Cordova has been moved from 3/2 to 3/16 at 1500 in the [facility] office  The patient was concerned that he might not be out of rehab by then  I reassured the patient that I would keep an eye on his chart and, in the event that he is not discharged by 3/16, I would move the appointment out again and come to tell him about the change  The patient expressed understanding of the content of our conversation and was appreciative of the information

## 2023-02-28 NOTE — PCC CARE MANAGEMENT
Pt continues to participate with therapy and is scheduled to return home later this week  Hhc services are in place through [facility]  Following for additional dc needs

## 2023-02-28 NOTE — PROGRESS NOTES
Progress Note - Infectious Disease   Raissa Petty 61 y o  male MRN: [id number]  Unit/Bed#: -01 Encounter: [id number]      Impression/Recommendations:  SIRS versus sepsis  WBC, HR, low-grade temp  Consider due to ongoing intraabdominal processes as below  Consider infection given temporal response to antibiotics  Consider tumor fever in setting of known liver metastases  Clinically stable and non-toxic  Rec:  • Continue to follow closely off antibiotics  • Follow temperatures and WBC closely  • Follow drain outputs closely  • If develops T>101 check blood cultures and consider repeat CT A/P     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  In setting of chronic cholecystostomy tube, perihepatic abscess as below  Alvina tube now with drainage consistent with this      Recent intra-abdominal abscess     In the setting complex surgical history as below   Initially developed 11/2022 accompanied by ESBL E  coli bacteremia   Status post exploratory laparotomy, right hemicolectomy, temporary abdominal closure 11/16; re-exploration, partial left colectomy, primary ileocolonic anastomosis with proximal diverting loop ileostomy, midline fascial closure on 11/17   Then developed abscess in hepatectomy bed, also collection +/- leak LUQ at area of prior colonic anastamosis   Status post IR drain into perihepatic collection 12/8   Cultures with ESBL E  Coli   Status post 7 days ertapenem   Developed recurrent sepsis and repeat CT 12/17 shows hepatic bed collection improved, persistent left intra-abdominal collection   Status post perigastic, perisplenic drains 12/20   Cultures again with ESBL E  Coli   Status post 21 days total antibiotics, completed 1/7   Left drains x2 removed 1/31  Midline hepatic bed drain removed 2/24  Collections improving off antibiotics          Possible acute cholecystitis     Status post percutaneous cholecystostomy tube   Alk phos remains markedly elevated, possibly due to drain itself versus known intrahepatic metastases      Chronic abdominal wound  In setting of prior incisional dehiscence  Status post recent debridement  Wound now appears clean      Metastatic colon cancer     To liver, possible lung   Status post resection, chemotherapy, more recent hepatic resection and ablation, transverse colon resection      CKD     Baseline Cr 1 4       DM      The above impression and plan was discussed in detail with the patient      Antibiotics:  None    Subjective:  Patient seen on AM rounds  Very tired  Took Atarax for sleep last night  Denies fevers, chills, sweats, nausea, vomiting, or diarrhea  24 Hour Events:  No documented fevers, chills, sweats, nausea, vomiting, or diarrhea  Objective:  Vitals:  Temp:  [98 °F (36 7 °C)-99 4 °F (37 4 °C)] 98 °F (36 7 °C)  HR:  [] 77  Resp:  [16-18] 16  BP: ()/(57-90) 128/71  SpO2:  [96 %-99 %] 96 %  Temp (24hrs), Av 7 °F (37 1 °C), Min:98 °F (36 7 °C), Max:99 4 °F (37 4 °C)  Current: Temperature: 98 °F (36 7 °C)    Physical Exam:   General:  No acute distress  Psychiatric:  Awake and alert  Pulmonary:  Normal respiratory excursion without accessory muscle use  Abdomen:  Soft, nontender  Extremities:  No edema  Skin:  No rashes    Lab Results:  I have personally reviewed pertinent labs    Results from last 7 days   Lab Units 23  0607 23  0552 23  0532   POTASSIUM mmol/L 4 1 3 6 3 7   CHLORIDE mmol/L 104 103 101   CO2 mmol/L 19* 21 20*   BUN mg/dL 16 16 14   CREATININE mg/dL 1 18 1 28 1 36*   EGFR ml/min/1 73sq m 67 61 56   CALCIUM mg/dL 8 2* 7 8* 7 6*   AST U/L  --   --  51*   ALT U/L  --   --  9*   ALK PHOS U/L  --   --  790*     Results from last 7 days   Lab Units 23  0632 23  0607 23  0619   WBC Thousand/uL 13 07* 13 41* 11 85*   HEMOGLOBIN g/dL 8 3* 8 7* 7 1*   PLATELETS Thousands/uL 363 355 313     Results from last 7 days   Lab Units 23  1525 23  0959   BLOOD CULTURE --  No Growth After 5 Days  No Growth After 5 Days  MRSA CULTURE ONLY  No Methicillin Resistant Staphlyococcus aureus (MRSA) isolated  --        Imaging Studies:   I have personally reviewed pertinent imaging study reports and images in PACS  EKG, Pathology, and Other Studies:   I have personally reviewed pertinent reports

## 2023-02-28 NOTE — PLAN OF CARE
Problem: PAIN - ADULT  Goal: Verbalizes/displays adequate comfort level or baseline comfort level  Description: Interventions:  - Encourage patient to monitor pain and request assistance  - Assess pain using appropriate pain scale  - Administer analgesics based on type and severity of pain and evaluate response  - Implement non-pharmacological measures as appropriate and evaluate response  - Consider cultural and social influences on pain and pain management  - Notify physician/advanced practitioner if interventions unsuccessful or patient reports new pain  Outcome: Progressing     Problem: INFECTION - ADULT  Goal: Absence or prevention of progression during hospitalization  Description: INTERVENTIONS:  - Assess and monitor for signs and symptoms of infection  - Monitor lab/diagnostic results  - Monitor all insertion sites, i e  indwelling lines, tubes, and drains  - Monitor endotracheal if appropriate and nasal secretions for changes in amount and color  - [place] appropriate cooling/warming therapies per order  - Administer medications as ordered  - Instruct and encourage patient and family to use good hand hygiene technique  - Identify and instruct in appropriate isolation precautions for identified infection/condition  Outcome: Progressing  Goal: Absence of fever/infection during neutropenic period  Description: INTERVENTIONS:  - Monitor WBC    Outcome: Progressing     Problem: SAFETY ADULT  Goal: Patient will remain free of falls  Description: INTERVENTIONS:  - Educate patient/family on patient safety including physical limitations  - Instruct patient to call for assistance with activity   - Consult OT/PT to assist with strengthening/mobility   - Keep Call bell within reach  - Keep bed low and locked with side rails adjusted as appropriate  - Keep care items and personal belongings within reach  - Initiate and maintain comfort rounds  - Make Fall Risk Sign visible to staff  - Offer Toileting every 2 Hours, in advance of need  - Initiate/Maintain bed/chair alarm  - Obtain necessary fall risk management equipment: non skid footwear  - Apply yellow socks and bracelet for high fall risk patients  - Consider moving patient to room near nurses station  Outcome: Progressing  Goal: Maintain or return to baseline ADL function  Description: INTERVENTIONS:  -  Assess patient's ability to carry out ADLs; assess patient's baseline for ADL function and identify physical deficits which impact ability to perform ADLs (bathing, care of mouth/teeth, toileting, grooming, dressing, etc )  - Assess/evaluate cause of self-care deficits   - Assess range of motion  - Assess patient's mobility; develop plan if impaired  - Assess patient's need for assistive devices and provide as appropriate  - Encourage maximum independence but intervene and supervise when necessary  - Involve family in performance of ADLs  - Assess for home care needs following discharge   - Consider OT consult to assist with ADL evaluation and planning for discharge  - Provide patient education as appropriate  Outcome: Progressing  Goal: Maintains/Returns to pre admission functional level  Description: INTERVENTIONS:  - Perform BMAT or MOVE assessment daily    - Set and communicate daily mobility goal to care team and patient/family/caregiver  - Collaborate with rehabilitation services on mobility goals if consulted  - Perform Range of Motion 3 times a day  - Reposition patient every 2 hours    - Dangle patient 3 times a day  - Stand patient 3 times a day  - Ambulate patient 3 times a day  - Out of bed to chair 3 times a day   - Out of bed for meals 3 times a day  - Out of bed for toileting  - Record patient progress and toleration of activity level   Outcome: Progressing     Problem: DISCHARGE PLANNING  Goal: Discharge to home or other facility with appropriate resources  Description: INTERVENTIONS:  - Identify barriers to discharge w/patient and caregiver  - Arrange for needed discharge resources and transportation as appropriate  - Identify discharge learning needs (meds, wound care, etc )  - Arrange for interpretive services to assist at discharge as needed  - Refer to Case Management Department for coordinating discharge planning if the patient needs post-hospital services based on physician/advanced practitioner order or complex needs related to functional status, cognitive ability, or social support system  Outcome: Progressing     Problem: Prexisting or High Potential for Compromised Skin Integrity  Goal: Skin integrity is maintained or improved  Description: INTERVENTIONS:  - Identify patients at risk for skin breakdown  - Assess and monitor skin integrity  - Assess and monitor nutrition and hydration status  - Monitor labs   - Assess for incontinence   - Turn and reposition patient  - Assist with mobility/ambulation  - Relieve pressure over bony prominences  - Avoid friction and shearing  - Provide appropriate hygiene as needed including keeping skin clean and dry  - Evaluate need for skin moisturizer/barrier cream  - Collaborate with interdisciplinary team   - Patient/family teaching  - Consider wound care consult   Outcome: Progressing     Problem: Nutrition/Hydration-ADULT  Goal: Nutrient/Hydration intake appropriate for improving, restoring or maintaining nutritional needs  Description: Monitor and assess patient's nutrition/hydration status for malnutrition  Collaborate with interdisciplinary team and initiate plan and interventions as ordered  Monitor patient's weight and dietary intake as ordered or per policy  Utilize nutrition screening tool and intervene as necessary  Determine patient's food preferences and provide high-protein, high-caloric foods as appropriate       INTERVENTIONS:  - Monitor oral intake, urinary output, labs, and treatment plans  - Assess nutrition and hydration status and recommend course of action  - Evaluate amount of meals eaten  - Assist patient with eating if necessary   - Allow adequate time for meals  - Recommend/ encourage appropriate diets, oral nutritional supplements, and vitamin/mineral supplements  - Order, calculate, and assess calorie counts as needed  - Recommend, monitor, and adjust tube feedings and TPN/PPN based on assessed needs  - Assess need for intravenous fluids  - Provide specific nutrition/hydration education as appropriate  - Include patient/family/caregiver in decisions related to nutrition  Outcome: Progressing     Problem: MOBILITY - ADULT  Goal: Maintain or return to baseline ADL function  Description: INTERVENTIONS:  -  Assess patient's ability to carry out ADLs; assess patient's baseline for ADL function and identify physical deficits which impact ability to perform ADLs (bathing, care of mouth/teeth, toileting, grooming, dressing, etc )  - Assess/evaluate cause of self-care deficits   - Assess range of motion  - Assess patient's mobility; develop plan if impaired  - Assess patient's need for assistive devices and provide as appropriate  - Encourage maximum independence but intervene and supervise when necessary  - Involve family in performance of ADLs  - Assess for home care needs following discharge   - Consider OT consult to assist with ADL evaluation and planning for discharge  - Provide patient education as appropriate  Outcome: Progressing  Goal: Maintains/Returns to pre admission functional level  Description: INTERVENTIONS:  - Perform BMAT or MOVE assessment daily    - Set and communicate daily mobility goal to care team and patient/family/caregiver  - Collaborate with rehabilitation services on mobility goals if consulted  - Perform Range of Motion 3 times a day  - Reposition patient every 2 hours    - Dangle patient 3 times a day  - Stand patient 3 times a day  - Ambulate patient 3 times a day  - Out of bed to chair 3 times a day   - Out of bed for meals 3 times a day  - Out of bed for toileting  - Record patient progress and toleration of activity level   Outcome: Progressing

## 2023-03-01 LAB
ANION GAP SERPL CALCULATED.3IONS-SCNC: 7 MMOL/L (ref 4–13)
BUN SERPL-MCNC: 14 MG/DL (ref 5–25)
CALCIUM SERPL-MCNC: 8.3 MG/DL (ref 8.3–10.1)
CHLORIDE SERPL-SCNC: 104 MMOL/L (ref 96–108)
CO2 SERPL-SCNC: 20 MMOL/L (ref 21–32)
CREAT SERPL-MCNC: 1.11 MG/DL (ref 0.6–1.3)
GFR SERPL CREATININE-BSD FRML MDRD: 72 ML/MIN/1.73SQ M
GLUCOSE P FAST SERPL-MCNC: 95 MG/DL (ref 65–99)
GLUCOSE SERPL-MCNC: 114 MG/DL (ref 65–140)
GLUCOSE SERPL-MCNC: 137 MG/DL (ref 65–140)
GLUCOSE SERPL-MCNC: 149 MG/DL (ref 65–140)
GLUCOSE SERPL-MCNC: 94 MG/DL (ref 65–140)
GLUCOSE SERPL-MCNC: 95 MG/DL (ref 65–140)
POTASSIUM SERPL-SCNC: 3.9 MMOL/L (ref 3.5–5.3)
SODIUM SERPL-SCNC: 131 MMOL/L (ref 135–147)

## 2023-03-01 RX ORDER — MIDODRINE HYDROCHLORIDE 2.5 MG/1
2.5 TABLET ORAL
Status: DISCONTINUED | OUTPATIENT
Start: 2023-03-02 | End: 2023-03-08

## 2023-03-01 RX ORDER — SODIUM CHLORIDE 1000 MG
1 TABLET, SOLUBLE MISCELLANEOUS 2 TIMES DAILY WITH MEALS
Status: DISCONTINUED | OUTPATIENT
Start: 2023-03-01 | End: 2023-03-03

## 2023-03-01 RX ORDER — CALCIUM CARBONATE 200(500)MG
500 TABLET,CHEWABLE ORAL 3 TIMES DAILY PRN
Status: DISCONTINUED | OUTPATIENT
Start: 2023-03-01 | End: 2023-03-14 | Stop reason: HOSPADM

## 2023-03-01 RX ADMIN — OXYCODONE HYDROCHLORIDE 5 MG: 5 TABLET ORAL at 21:58

## 2023-03-01 RX ADMIN — INSULIN GLARGINE 5 UNITS: 100 INJECTION, SOLUTION SUBCUTANEOUS at 21:57

## 2023-03-01 RX ADMIN — TAMSULOSIN HYDROCHLORIDE 0.4 MG: 0.4 CAPSULE ORAL at 17:15

## 2023-03-01 RX ADMIN — ATORVASTATIN CALCIUM 40 MG: 40 TABLET, FILM COATED ORAL at 09:40

## 2023-03-01 RX ADMIN — PANTOPRAZOLE SODIUM 40 MG: 40 TABLET, DELAYED RELEASE ORAL at 09:53

## 2023-03-01 RX ADMIN — HYDROXYZINE HYDROCHLORIDE 10 MG: 10 TABLET ORAL at 21:58

## 2023-03-01 RX ADMIN — CALCIUM CARBONATE (ANTACID) CHEW TAB 500 MG 500 MG: 500 CHEW TAB at 17:15

## 2023-03-01 RX ADMIN — SIMETHICONE 80 MG: 80 TABLET, CHEWABLE ORAL at 09:53

## 2023-03-01 RX ADMIN — COLLAGENASE SANTYL: 250 OINTMENT TOPICAL at 15:01

## 2023-03-01 RX ADMIN — DULOXETINE HYDROCHLORIDE 30 MG: 30 CAPSULE, DELAYED RELEASE ORAL at 09:40

## 2023-03-01 RX ADMIN — ENOXAPARIN SODIUM 40 MG: 40 INJECTION SUBCUTANEOUS at 09:40

## 2023-03-01 RX ADMIN — SIMETHICONE 80 MG: 80 TABLET, CHEWABLE ORAL at 21:58

## 2023-03-01 RX ADMIN — SODIUM CHLORIDE 1 G: 1 TABLET ORAL at 09:43

## 2023-03-01 RX ADMIN — ACETAMINOPHEN 650 MG: 325 TABLET ORAL at 21:58

## 2023-03-01 RX ADMIN — INSULIN LISPRO 4 UNITS: 100 INJECTION, SOLUTION INTRAVENOUS; SUBCUTANEOUS at 17:15

## 2023-03-01 RX ADMIN — SODIUM CHLORIDE 1 G: 1 TABLET ORAL at 18:07

## 2023-03-01 RX ADMIN — ASPIRIN 81 MG CHEWABLE TABLET 81 MG: 81 TABLET CHEWABLE at 09:40

## 2023-03-01 RX ADMIN — SIMETHICONE 80 MG: 80 TABLET, CHEWABLE ORAL at 11:54

## 2023-03-01 RX ADMIN — SIMETHICONE 80 MG: 80 TABLET, CHEWABLE ORAL at 17:15

## 2023-03-01 RX ADMIN — PANTOPRAZOLE SODIUM 40 MG: 40 TABLET, DELAYED RELEASE ORAL at 16:12

## 2023-03-01 RX ADMIN — INSULIN LISPRO 4 UNITS: 100 INJECTION, SOLUTION INTRAVENOUS; SUBCUTANEOUS at 11:54

## 2023-03-01 NOTE — PROGRESS NOTES
03/01/23 1030   Pain Assessment   Pain Assessment Tool 0-10   Pain Score No Pain   Restrictions/Precautions   Precautions Fall Risk;Contact/isolation;Multiple lines;Supervision on toilet/commode   Weight Bearing Restrictions No   ROM Restrictions No   Braces or Orthoses Other (Comment)  (TEDs)   Lifestyle   Autonomy "I feel a little weaker today but I had a tough therapy day yesterday"   Sit to Stand   Type of Assistance Needed Incidental touching   Physical Assistance Level No physical assistance   Comment CG no AD   Sit to Stand CARE Score 4   Bed-Chair Transfer   Type of Assistance Needed Incidental touching   Physical Assistance Level No physical assistance   Comment CG no AD SPT   Chair/Bed-to-Chair Transfer CARE Score 4   Exercise Tools   Other Exercise Tool 1 pt engages in UE strengthening using green flexbar, 4x10 for forearm pronation/supination focusing on strengthening/endurance for ADLs/transfers  good tolerance to exercises with rest breaks in between each set to manage fatigue  Cognition   Overall Cognitive Status WFL   Arousal/Participation Alert; Cooperative   Attention Within functional limits   Orientation Level Oriented X4   Memory Within functional limits   Following Commands Follows all commands and directions without difficulty   Activity Tolerance   Activity Tolerance Patient tolerated treatment well   Assessment   Treatment Assessment pt engages in brief 30 minute skilled OT Session focusing on STS, SPT and UE strengthening  see above for full func details  pt reports feeling more weak/fatigued today, reports having a good/tough PT session yesterday  continues to be very motivated to return home and to inc overall strength, endurance and balance  continues to require inc time for all tasks to manage fatigue  recommend continued skilled care to focus on ADL retraining, func transfers, standing julia/bal, strength/endurance, in order to decrease burden of care at d/c     Prognosis Good Problem List Decreased strength;Decreased range of motion;Decreased endurance; Impaired balance;Decreased mobility;Pain   Barriers to Discharge Inaccessible home environment;Decreased caregiver support   Plan   Treatment/Interventions ADL retraining;Functional transfer training; Therapeutic exercise; Endurance training;Patient/family training;Equipment eval/education; Compensatory technique education   OT Therapy Minutes   OT Time In 1030   OT Time Out 1100   OT Total Time (minutes) 30   OT Mode of treatment - Individual (minutes) 30   OT Mode of treatment - Concurrent (minutes) 0   OT Mode of treatment - Group (minutes) 0   OT Mode of treatment - Co-treat (minutes) 0   OT Mode of Treatment - Total time(minutes) 30 minutes   OT Cumulative Minutes 270   Therapy Time missed   Time missed?  No

## 2023-03-01 NOTE — PROGRESS NOTES
Progress Note - Infectious Disease   Osvaldo Grey 61 y o  male MRN: [id number]  Unit/Bed#: -01 Encounter: [id number]      Impression/Recommendations:  SIRS versus sepsis  WBC, HR, low-grade temp   Consider due to ongoing intraabdominal processes as below   Consider infection given temporal response to antibiotics   Consider tumor fever in setting of known liver metastases   Clinically stable and non-toxic  Rec:  • Continue to follow closely off antibiotics  • Follow temperatures and WBC closely  • Follow drain outputs closely  • If develops T>101 check blood cultures and consider repeat CT A/P     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   In setting of chronic cholecystostomy tube, perihepatic abscess as below   Alvina tube now with drainage consistent with this      Recent intra-abdominal abscess     In the setting complex surgical history as below   Initially developed 11/2022 accompanied by ESBL E  coli bacteremia   Status post exploratory laparotomy, right hemicolectomy, temporary abdominal closure 11/16; re-exploration, partial left colectomy, primary ileocolonic anastomosis with proximal diverting loop ileostomy, midline fascial closure on 11/17   Then developed abscess in hepatectomy bed, also collection +/- leak LUQ at area of prior colonic anastamosis   Status post IR drain into perihepatic collection 12/8   Cultures with ESBL E  Coli   Status post 7 days ertapenem   Developed recurrent sepsis and repeat CT 12/17 shows hepatic bed collection improved, persistent left intra-abdominal collection   Status post perigastic, perisplenic drains 12/20   Cultures again with ESBL E  Coli   Status post 21 days total antibiotics, completed 1/7   Left drains x2 removed 1/31   Midline hepatic bed drain removed 2/24   Collections improving off antibiotics          Possible acute cholecystitis     Status post percutaneous cholecystostomy tube   Alk phos remains markedly elevated, possibly due to drain itself versus known intrahepatic metastases      Chronic abdominal wound  In setting of prior incisional dehiscence   Status post recent debridement   Wound now appears clean      Metastatic colon cancer     To liver, possible lung   Status post resection, chemotherapy, more recent hepatic resection and ablation, transverse colon resection      CKD     Baseline Cr 1 4       DM      The above impression and plan was discussed in detail with the patient      Antibiotics:  None    Subjective:  Patient seen on PM rounds  Tired but has been working hard with therapy  Denies fevers, chills, sweats, nausea, vomiting, or diarrhea  24 Hour Events:  No documented fevers, chills, sweats, nausea, vomiting, or diarrhea  Objective:  Vitals:  Temp:  [97 7 °F (36 5 °C)-99 1 °F (37 3 °C)] 97 7 °F (36 5 °C)  HR:  [] 95  Resp:  [18-20] 19  BP: ()/(64-89) 122/79  SpO2:  [92 %-99 %] 99 %  Temp (24hrs), Av 4 °F (36 9 °C), Min:97 7 °F (36 5 °C), Max:99 1 °F (37 3 °C)  Current: Temperature: 97 7 °F (36 5 °C)    Physical Exam:   General:  No acute distress  Psychiatric:  Awake and alert  Pulmonary:  Normal respiratory excursion without accessory muscle use  Abdomen:  Opaque tan, milky fluid in radha tube  Extremities:  No edema  Skin:  No rashes    Lab Results:  I have personally reviewed pertinent labs    Results from last 7 days   Lab Units 23  0623 23  0607 23  0552 23  0532   POTASSIUM mmol/L 3 9 4 1 3 6 3 7   CHLORIDE mmol/L 104 104 103 101   CO2 mmol/L 20* 19* 21 20*   BUN mg/dL 14 16 16 14   CREATININE mg/dL 1 11 1 18 1 28 1 36*   EGFR ml/min/1 73sq m 72 67 61 56   CALCIUM mg/dL 8 3 8 2* 7 8* 7 6*   AST U/L  --   --   --  51*   ALT U/L  --   --   --  9*   ALK PHOS U/L  --   --   --  790*     Results from last 7 days   Lab Units 23  0632 23  0607 23  0619   WBC Thousand/uL 13 07* 13 41* 11 85*   HEMOGLOBIN g/dL 8 3* 8 7* 7 1*   PLATELETS Thousands/uL 363 355 313 Results from last 7 days   Lab Units 02/24/23  1525   MRSA CULTURE ONLY  No Methicillin Resistant Staphlyococcus aureus (MRSA) isolated       Imaging Studies:   I have personally reviewed pertinent imaging study reports and images in PACS  EKG, Pathology, and Other Studies:   I have personally reviewed pertinent reports

## 2023-03-01 NOTE — CASE MANAGEMENT
Clinical update faxed via [facility] to Willam Hatch at [facility], [phone]  Awaiting determination

## 2023-03-01 NOTE — PCC NURSING
ASSESSMENT/PLAN: (Interval History)  Romy Weathers is seen and examined and management for following issues:     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  s/p diverting loop colostomy/liver biopsy with subsequent chemotherapy 2/2022  s/p hepatic resection and reversal of colostomy on 11/7/22  s/p ex-lap with bowel resection/VAC placement 11/16/22  s/p right hemicolectomy with partial descending colectomy, colocolonic anastomosis with loop ileostomy and mid line abdominal VAC placement 11/17  s/p additional drains placed for a perisplenic abscess and splenic abscess 12/20/22 (then had 3 drains plus the already existing perc radha tube)  The 2 left lateral drains were removed by IR 1/31/23 as an OP  Had previously been tx'd with Ertapenem  This hospital stay had fever/leukocytosis/sepsis, started Meropenem and then to Vancomycin and Ertapenem  Wound cx again = Ecoli ESBL  LD Ertapenem 2/14, Vancomycin 2/16  To IR 2/24 = Abscessogram of midline drain showed minimal persistent collection and the midline catheter was removed  Recent wound debridement by general surgery prior to transfer = continue Santyl qd to wound  Has chr elevated Alk phos 700's to 800s, now 900s again today  For CMP in AM     Acute cholecystitis  s/p percutaneous tube placement 12/8/22  CT scan was concerning for cholecystitis --> to IR 2/24 = showed retraction of the radha tube and interval occlusion of the cystic duct  +Cholecysto enteric fistula  Radha tube was exchanged  Surgery saw 2/27/23 = stable  Drainage from perc radha tube milky light green (20ml on 3/4/23 and nothing recorded from 3/5 or 3/6 although he says they did dump some from it on 3/5)  For perc radha tube check today          PORT catheter  On 2/24/23 in IR = port check showed probable small thrombus at tip of cathter, flushed/improved aspiration of the port     HTN/orthostasis/tachycardia  D/c'd Norvasc 2 5mg qd since BPs were too low to get  Did have some symptomatic orthostasis on 4/65/46 98 systolic and 6/50/76 with 87/57 standing (102/71 sitting)  Continue TEDS  On 3/1/23 AM SBP standing was 87 with HR up to 122 w/o sx and then with PT SBP 98 with sx  Started Midodrine 2 5mg BID on 3/2/23 (give at 0600, 1100) --> held 3/3/23 AM and lunchtime since SBPs were 140-150  HR are 90s to low 110's mostly    TSH on 3/3/23 = 1 35     Diabetes mellitus  Home:  Lantus 8U qam/Lispro 4U TID  Here:  Lantus 5U qhs/Lispro 5U TID  Has a DEXCOM meter and a regular meter at home  Continue DM diet, QID Accuchecks/SSI  No changes today     ALEXY/CKD III  Baseline 1 2-1 4  Creat was 2 5 on admission with a K+ level of 5 9  Creat jumped to 1 66 on 3/6/23 and gave NSS x 1 liter  Today, creat 1 58 = will give another liter NSS  CMP in AM      Anemia  Transfused in December and January  Hemoglobin was 7 1 on 2/26 = transfused x 1  Hemoglobin had dropped to 7 7 on 3/6/23  He has had no bleeding  Today hemoglobin stable at 7 6  Will recheck 3/9/23   Situational depression  Cymbalta      Left pleural effusion  Thoracentesis 1/12 for 300ml  Cyto from pleural fluid was negative for malignancy; cx = NG  CT 2/22/23 = "Interval worsening of moderate right and small left pleural effusions"  Has had no SOB  Will watch     Hyponatremia  Felt likely 2/2 SIADH in setting of malignancy  On 2/27/23, added NACL 1 GM qd  On 3/1/23, increased NACL tabs to BID, inc to 2Gm BID 3/3/23  Will keep NACL tabs same and also is getting another liter of NSS today for hydration considering ALEXY/CKD  CMP AM 3/8     Malnutrition  Had Magic cups BID ordered but didn't like them so stopped  Prefers premier protein banana flavored shake --> wife brought in    One bottle has 30 Gm protein and only 4 carbs     Low grade temp  Was 100 3 on evening of 2/25 = afebrile since then  Has had mild leukocytosis  ID did see 2/27 = watching for now; if temp >101 then to check blood cxs and consider repeat CT of the abd/pelvis  WBC is up from 15 to 17 but may be on basis of perc radha tube malfunction  ID to see        Discharge date:  Team    This week we will encourage independence with ADLs  We will monitor labs and vital signs  We will educate pt/family about repositioning to prevent skin breakdown  We will assist w repositioning and  perform routine skin checks  We will monitor for adequate pain control  We will monitor for constipation and medicate as ordered  We will increase safety awareness with transfers and keep pt free from falls

## 2023-03-01 NOTE — TEAM CONFERENCE
[facility] Conference Note  Date: 3/1/2023   Time: 11:22 AM       Patient Name:  Ophelia Robles       Medical Record Number: [id number]   YOB: 1965  Sex: Male          Room/Bed:  /[facility] 676-25  Payor Info:  Payor: [facility] / Plan: [facility] / Product Type: HMO Commercial /      Admitting Diagnosis: Debility [R53 81]   Admit Date/Time:  2/24/2023  2:58 PM  Admission Comments: No comment available     Primary Diagnosis:  Sepsis ([facility] )  Principal Problem: Sepsis [facility])    Patient Active Problem List    Diagnosis Date Noted   • Abdominal wound dehiscence 02/24/2023   • Cholecystitis, unspecified 02/24/2023   • Depression 02/24/2023   • Chronic bilateral pleural effusions 02/24/2023   • Nephrolithiasis 02/09/2023   • Abnormal CT scan 02/07/2023   • Hyponatremia 02/07/2023   • Dehiscence of incision 12/30/2022   • Elevated alkaline phosphatase level 12/30/2022   • Leukocytosis 12/29/2022   • Abscess 12/27/2022   • Acute pain 12/27/2022   • Sepsis ([facility] ) 12/17/2022   • Severe protein-calorie malnutrition ([facility] ) 12/14/2022   • Acute on chronic kidney failure ([facility] ) 12/14/2022   • MR (mitral regurgitation) 11/12/2022   • ESBL (extended spectrum beta-lactamase) producing bacteria infection 11/12/2022   • Encephalopathy 11/09/2022   • Cervical radiculopathy 10/26/2022   • Other fatigue 06/15/2022   • Colostomy prolapse ([facility] ) 05/27/2022   • Colon cancer metastasized to liver ([facility] ) 03/12/2022   • 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 liver ([facility] ) 03/02/2022   • Iron deficiency anemia, unspecified 03/01/2022   • Malignant neoplasm of transverse colon ([facility] ) 03/01/2022   • Thrombocytosis 02/21/2022   • Hypokalemia 02/19/2022   • Transaminitis 02/19/2022   • Type 2 diabetes mellitus with hyperlipidemia ([facility] ) 02/18/2022   • Anemia 02/18/2022   • Left ureteral calculus 01/30/2020   • Incarcerated umbilical hernia 11/44/6961   • Testicular hypogonadism 06/19/2017   • Low testosterone 05/30/2017   • Type 2 diabetes mellitus without complication, with long-term current use of insulin ([facility] ) 08/23/2016   • Benign essential hypertension 08/23/2016   • Mixed hyperlipidemia 08/23/2016   • Erectile dysfunction 07/11/2016   • Obesity (BMI 30-39 9) 07/11/2016       Physical Therapy:    Weight Bearing Status: Full Weight Bearing  Transfers: Minimal Assistance, Moderate Assistance  Bed Mobility: Moderate Assistance  Amulation Distance (ft): 91 feet  Ambulation: Moderate Assistance  Assistive Device for Ambulation: Hand Hold Assistance  Wheelchair Mobility Distance: 0 ft  Number of Stairs: 6  Assistive Device for Stairs: Bilateral Hand Rails  Stair Assistance: Minimal Assistance  Discharge Recommendations: Home with:  [address] with[de-identified] Family Support    Pt moving slow but much better in skilled PT this time   Pt will cont to need strengthening and conditioning to meet DC goals   Kimoreinaldo Jauregui Pt is a good rehab candidate with ELOS 2 weeks and goals set at [facility]  POC to focus on gait training, transfer training, stair training, neuromuscular re-education, improving pt's activity tolerance and endurance, LE strengthening, balance interventions and assessment for appropriate AD  Cont POC toward DC goals       Occupational Therapy:  Eating: Independent  Grooming: Supervision  Bathing: Minimal Assistance  Bathing: Minimal Assistance  Upper Body Dressing: Incidental Touching  Lower Body Dressing: Incidental Touching  Toileting: Maximum Assistance  Toilet Transfer: Minimal Assistance  Cognition: Within Defined Limits  Orientation: Person, Time, Place, Situation       Pt is demonstrating good progress with occupational therapy and is progressing toward long term goals for ADL, IADL, and functional transfers/mobility  Pts long term goals for ADLs are Independent with Rolling Walker  Pt continues to present with impairments in activity tolerance, endurance, standing balance/tolerance, and coping skills    Occupational performance remains limited by fatigue, SOB, decreased caregiver support, risk for falls, and home environment  Family training/education will be required prior to D/C  Pt will continue to benefit from skilled acute rehab OT services to address above mentioned barriers and maximize functional independence in baseline areas of occupation to meet established treatment goals with overall decreased burden of care  Plan of care to continue to focus on ADL Retraining ,  LHAE education/training, IADL training , Functional Transfers, Functional Cognition, Standing tolerance, Standing balance , DME training/education, Family training/education, Energy conservation training/education, healthy coping education, Leisure and social pursuits, and community re-integration  Goals for the upcoming week are: increasing overall functional activity tolerance       Anticipate Re-team at this time  Shireen Laura Speech Therapy:           No notes on file    Nursing Notes:  Appetite: Good  Diet Type: Diabetic                      Diet Patient/Family Education Complete: No    Type of Wound (LDA): Wound                    Type of Wound Patient/Family Education: Yes  Bladder: Continent     Bladder Patient/Family Education: No  Bowel: Continent     Bowel Patient/Family Education: No  Pain Location/Orientation: Location: Abdomen, Orientation: Right  Pain Score: 8                       Hospital Pain Intervention(s): Medication (See MAR), Repositioned  Pain Patient/Family Education: Yes       Sana Zaragoza is a 61 y o  who presents here today with his spouse with reports of a general decline over the last few days  He was released from rehab a few weeks ago and was doing okay and now at this point has been progressively decompensating  Reports generalized weakness  Reports foul-smelling odor from his midline abdominal wound    Reports feeling short of breath as well  Patient has a history of metastatic colonic adenocarcinoma, recently discharged from prolonged surgical admission culminating in partial colectomy with diverting ileostomy c/b intra-abdominal abscesswho presented to [facility] on 11/7 for an elective surgical procedure due to metastatic colon adenocarcinoma by Dr Laurie Reese  Patient presented to hem/onc on 2/22 when CT of abdomen revealed transverse colonic apple core lesion and innumerable hepatic metastases  MRI revealed multiple hepatic metastasis with smaller lesions in left lobe with larger lesions on the right lobe  On 11/7, patient underwent exploratory laparotomy, segment 3 liver resection, ablation, intraoperative ultrasound of liver  This was complicated by a left upper quadrant hematoma, imaging showed suspected leak from transverse colon anastomosis  On 11/16 went to OR revealed left upper quadrant infected hematoma, defect in the transverse colon anastomosis with extravasation of succus  Massively dilated cecum requiring resection  He had a right hemicolectomy, left in discontinuity and temporary abdominal closure device was placed  On 11/17, he underwent re-exploration, partial descending colectomy, primary colonic anastomosis created with diverting loop ileostomy and midline wound VAC placement  He completed a prolonged course of IV antibiotics for ESBL bacteremia due to intra-abdominal abscess  On 12/3, patient was discharged to SNF for rehab  Patient was transferred back to hospital on 12/4 due to abdominal pain  Abdominal incision was with noted yellow drainage  CT chest/abdomen/pelvis showed abdominal abscess and distended gallbladder  ID was consulted, and patient was continued on IV antibiotics (plan for 7 day course through 12/15)   Patient was taken to IR and underwent percutaneous cholecystostomy tube insertion and hepatectomy abscess drainage    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  • s/p diverting loop colostomy/liver biopsy with subsequent chemotherapy 2/2022  • s/p hepatic resection and reversal of colostomy on 11/7/22  • s/p ex-lap with bowel resection/VAC placement 11/16/22  • s/p right hemicolectomy with partial descending colectomy, colocolonic anastomosis with loop ileostomy and mid line abdominal VAC placement 11/17  • s/p additional drains placed for a perisplenic abscess and splenic abscess 12/20/22 (then had 3 drains plus the already existing perc radha tube)  • The 2 left lateral drains were removed by IR 1/31/23 as an OP  • Had previously been tx'd with Ertapenem  • This hospital stay had fever/leukocytosis/sepsis, started Meropenem and then to Vancomycin and Ertapenem  • Wound cx again = Ecoli ESBL  • LD Ertapenem 2/14, Vancomycin 2/16  • To IR 2/24 = Abscessogram of midline drain showed minimal persistent collection and the midline catheter was removed  • Recent wound debridement by general surgery prior to transfer = continue Santyl qd to wound     Acute cholecystitis  • s/p percutaneous tube placement 12/8/22  • CT scan was concerning for cholecystitis --> to IR 2/24 = showed retraction of the radha tube and interval occlusion of the cystic duct  +Cholecysto enteric fistula  Radha tube was exchanged  • Drainage from perc radha tube was milky light green on 2/27  • Surgery saw 2/27/23 = stable     PORT catheter  • On 2/24/23 in IR = port check showed probable small thrombus at tip of cathter, flushed/improved aspiration of the port     HTN/orthostasis  • D/c'd Norvasc 2 5mg qd  Since BPs were too low to get  • Did have some symptomatic orthostasis on 3/02/69 98 systolic and 6/29/91 with 87/57 standing (102/71 sitting)  • Will continue TEDS; may need a liter NSS if reoccurs      Diabetes mellitus  • Home:  Lantus 8U qam/Lispro 4U TID  • Here:  Lantus 5U qhs/Lispro 4U TID     • Has a DEXCOM meter and a regular meter at home  • Continue DM diet, QID Accuchecks/SSI  Anemia  • Transfused in December and January  • Hemoglobin was 7 1 on 2/26 = transfused x 1  • Hemoglobin today is up to 8 7       Left pleural effusion  • Thoracentesis 1/12 for 300ml  • Cyto from pleural fluid was negative for malignancy; cx = NG  • CT 2/22/23 = "Interval worsening of moderate right and small left pleural effusions"  • Will watch     Hyponatremia  • Mild  • Felt likely 2/2 SIADH in setting of malignancy  • Down to 131 from 132  • On 2/27/23, added NACL 1 GM qd  • BMP in AM                Case Management:     Discharge Planning  Living Arrangements: Lives w/ Children, Lives w/ Spouse/significant other  Support Systems: Spouse/significant other, Children  Assistance Needed: yes  Type of Current Residence: Private residence  Current Home Care Services: Yes  Pt admitted s/p sepsis and is known to staff from his previous stay  Pt has a supportive spouse and is familiar with [facility] services  Pt is aware of the potential for a two week los  Following to assist w/dc recommendations  Is the patient actively participating in therapies? yes  List any modifications to the treatment plan:     Barriers Interventions   deconditioned Therapy strengthening exercises   Drains,  Family education on care, c services   Pain at the abdomen from drain site Pain meds reposiitoning   anemia Required transfusion this past wknd   endurance Energy conservation, endurance training     Is the patient making expected progress toward goals?  yes  List any update or changes to goals:     Medical Goals: Patient will be medically stable for discharge to [facility] upon completion of rehab program and Patient will be able to manage medical conditions and comorbid conditions with medications and follow up upon completion of rehab program    Weekly Team Goals:   Rehab Team Goals  ADL Team Goal: Patient will be independent with ADLs with least restrictive device upon completion of rehab program  Bowel/Bladder Team Goal: Patient will require supervision with bladder/bowel management with least restrictive device upon completion of rehab program  Transfer Team Goal: Patient will be independent with transfers with least restrictive device upon completion of rehab program  Locomotion Team Goal: Patient will be independent with locomotion with least restrictive device upon completion of rehab program    Discussion: pt presents with the above barriers and is functioning min a with transfers and mobility without ad  Pt is min to contact guard with adls  Goals are for independent goals with an estimated los of two weeks  Recommendations are for contd [facility] services for rn and pt  Anticipated Discharge Date:  3/10/2023  Jessa  Team Members Present: The following team members are supervising care for this patient and were present during this Weekly Team Conference      Physician: Dr Nikolai Brown, DO  : Calvin Brannon MSW  Registered Nurse: Ruperto Ayala, RN, BSN, [address] [place] [address]  Physical Therapist: David Jenkins DPT  Occupational Therapist: Paulino Breaux MS, OTR/L  Speech Therapist:

## 2023-03-01 NOTE — PROGRESS NOTES
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  • s/p diverting loop colostomy/liver biopsy with subsequent chemotherapy 2/2022  • s/p hepatic resection and reversal of colostomy on 11/7/22  • s/p ex-lap with bowel resection/VAC placement 11/16/22  • s/p right hemicolectomy with partial descending colectomy, colocolonic anastomosis with loop ileostomy and mid line abdominal VAC placement 11/17  • s/p additional drains placed for a perisplenic abscess and splenic abscess 12/20/22 (then had 3 drains plus the already existing perc radha tube)  • The 2 left lateral drains were removed by IR 1/31/23 as an OP  • Had previously been tx'd with Ertapenem  • This hospital stay had fever/leukocytosis/sepsis, started Meropenem and then to Vancomycin and Ertapenem  • Wound cx again = Ecoli ESBL  • LD Ertapenem 2/14, Vancomycin 2/16  • To IR 2/24 = Abscessogram of midline drain showed minimal persistent collection and the midline catheter was removed  • Recent wound debridement by general surgery prior to transfer = continue Santyl qd to wound     Acute cholecystitis  • s/p percutaneous tube placement 12/8/22  • CT scan was concerning for cholecystitis --> to IR 2/24 = showed retraction of the radha tube and interval occlusion of the cystic duct   +Cholecysto enteric fistula   Radha tube was exchanged    • Drainage from perc radha tube was milky light green on 2/27  • Surgery saw 2/27/23 = stable     PORT catheter  • On 2/24/23 in IR = port check showed probable small thrombus at tip of cathter, flushed/improved aspiration of the port     HTN/orthostasis  • D/c'd Norvasc 2 5mg qd  Since BPs were too low to get  • Did have some symptomatic orthostasis on 5/18/02 98 systolic and 2/27/23 with 87/57 standing (102/71 sitting)  • Continue TEDS  • This AM SBP standing was 87 with HR up to 122 w/o sx and then with PT SBP 98 with sx  • Will start Midodrine 2 5mg BID and give at 0600 and 1100     Diabetes mellitus  • Home:  Lantus 8U qam/Lispro 4U TID  • Here:  Lantus 5U qhs/Lispro 4U TID  • Has a DEXCOM meter and a regular meter at home  • Continue DM diet, QID Accuchecks/SSI     ALEXY/CKD III  • Baseline 1 2-1 4  • Creat was 2 5 on admission with a K+ level of 5 9  • Currently creat is 1 11     Anemia  • Transfused in December and January  • Hemoglobin was 7 1 on 2/26 = transfused x 1  • Hemoglobin stable     Situational depression  • Cymbalta      Left pleural effusion  • Thoracentesis 1/12 for 300ml  • Cyto from pleural fluid was negative for malignancy; cx = NG  • CT 2/22/23 = "Interval worsening of moderate right and small left pleural effusions"  • Will watch     Hyponatremia  • Mild  • Felt likely 2/2 SIADH in setting of malignancy  • On 2/27/23, added NACL 1 GM qd  • Will increase NACL tabs to BID     Malnutrition  • Had Magic cups BID ordered but didn't like them so stopped  • Prefers premier protein banana flavored shake --> wife to bring in  One bottle has 30 Gm protein and only 4 carbs     Low grade temp  • Was 100 3 on evening of 2/25 = afebrile since then  • Has had mild leukocytosis  • ID did see 2/27 = watching for now; if temp >101 then to check blood cxs and consider repeat CT of the abd/pelvis        Discharge date:  Team       The above assessment and plan was reviewed and updated as determined by my evaluation of the patient on 3/1/2023      Labs:   Results from last 7 days   Lab Units 02/28/23  0632 02/27/23  0607   WBC Thousand/uL 13 07* 13 41*   HEMOGLOBIN g/dL 8 3* 8 7*   HEMATOCRIT % 26 6* 27 3*   PLATELETS Thousands/uL 363 355     Results from last 7 days   Lab Units 03/01/23  0623 02/27/23  0607   SODIUM mmol/L 131* 131*   POTASSIUM mmol/L 3 9 4 1   CHLORIDE mmol/L 104 104   CO2 mmol/L 20* 19*   BUN mg/dL 14 16   CREATININE mg/dL 1 11 1 18   CALCIUM mg/dL 8 3 8 2*             Results from last 7 days   Lab Units 03/01/23  1130 03/01/23  0632 02/28/23  2108   POC GLUCOSE mg/dl 149* 94 161*       Review of Scheduled Meds:  Current Facility-Administered Medications   Medication Dose Route Frequency Provider Last Rate   • acetaminophen  650 mg Oral Q4H PRN Garo Lark, DO     • aspirin  81 mg Oral Daily Garo Lark, DO     • atorvastatin  40 mg Oral Daily Garo Lark, DO     • collagenase   Topical Daily Garo Lark, DO     • DULoxetine  30 mg Oral Daily Garo Lark, DO     • enoxaparin  40 mg Subcutaneous Q24H [facility] GAURANG Keith     • hydrOXYzine HCL  10 mg Oral HS Garo Lark, DO     • insulin glargine  5 Units Subcutaneous HS Garo Lark, DO     • insulin lispro  1-6 Units Subcutaneous TID AC Garo Lark, DO     • insulin lispro  1-6 Units Subcutaneous HS Garo Lark, DO     • insulin lispro  4 Units Subcutaneous TID With Meals Garo Lark, DO     • melatonin  6 mg Oral HS PRN Garo Lark, DO     • methocarbamol  500 mg Oral BID PRN Garo Lark, DO     • ondansetron  4 mg Intravenous Q4H PRN Garo Lark, DO     • oxyCODONE  2 5 mg Oral Q4H PRN Garo Lark, DO     • oxyCODONE  5 mg Oral Q4H PRN Garo Lark, DO     • pantoprazole  40 mg Oral BID AC Garo Lark, DO     • simethicone  80 mg Oral 4x Daily (with meals and at bedtime) Garo Lark, DO     • sodium chloride  1 g Oral Daily Before Breakfast GAURANG Flaherty     • tamsulosin  0 4 mg Oral Daily With [address] [place] [address], DO         Subjective/ HPI: Patient seen and examined  Patients overnight issues or events were reviewed with nursing or staff during rounds or morning huddle session  New or overnight issues include the following:     No new or overnight issues  Offers no complaints    ROS:   A 10 point ROS was performed; negative except as noted above         Imaging:     No orders to display       *Labs /Radiology studies reviewed  *Medications reviewed and reconciled as needed  *Please refer to order section for additional ordered labs studies  *Case discussed with primary attending during morning huddle case rounds    Physical Examination:  Vitals:   Vitals:    02/28/23 2003 03/01/23 0623 03/01/23 0835 03/01/23 0837   BP: 134/89 133/86 110/78 (!) 87/64   BP Location: Right arm Left arm Left arm Left arm   Pulse: 100 85 (!) 111 (!) 122   Resp: 20 18     Temp: 98 4 °F (36 9 °C) 99 1 °F (37 3 °C)     TempSrc: Oral Oral     SpO2: 98% 98% 96% 92%   Weight:  80 1 kg (176 lb 9 4 oz)     Height:           General Appearance: no distress, conversive  HEENT:  External ear normal   Nose normal w/o drainage  Mucous membranes are moist  Oropharynx is clear  Conjunctiva clear w/o icterus or redness  Neck:  Supple, normal ROM  Lungs: BBS without crackles/wheeze/rhonchi; respirations unlabored with normal inspiratory/expiratory effort  No retractions noted  On RA  CV: regular rate and rhythm; no rubs/murmurs/gallops, PMI normal   ABD: Abdomen is soft  Bowel sounds all quadrants  Nontender with no distention  Ostomy with watery light brown stool; per radha tube with milky green drainage  EXT: no edema  Skin: normal turgor, normal texture, no rashes  Psych: affect normal, mood normal  Neuro: AAO      The above physical exam was reviewed and updated as determined by my evaluation of the patient on 3/1/2023  Invasive Devices     Central Venous Catheter Line  Duration           Port A Cath 03/10/22 Right Chest 356 days          Drain  Duration           Ileostomy  days    Cholecystostomy Tube 5 days                   VTE Pharmacologic Prophylaxis: Enoxaparin  Code Status: Level 1 - Full Code  Current Length of Stay: 5 day(s)      Total time spent:  30 minutes with more than 50% spent counseling/coordinating care   Counseling includes discussion with patient re: progress  and discussion with patient of his/her current medical state/information  Coordination of patient's care was performed in conjunction with primary service  Time invested included review of patient's labs, vitals, and management of their comorbidities with continued monitoring  In addition, this patient was discussed with medical team including physician and advanced extenders  The care of the patient was extensively discussed and appropriate treatment plan was formulated unique for this patient  Medical decision making for the day was made by supervising physician unless otherwise noted in their attestation statement  ** Please Note:  voice to text software may have been used in the creation of this document   Although proof errors in transcription or interpretation are a potential of such software**

## 2023-03-01 NOTE — PLAN OF CARE
Problem: PAIN - ADULT  Goal: Verbalizes/displays adequate comfort level or baseline comfort level  Description: Interventions:  - Encourage patient to monitor pain and request assistance  - Assess pain using appropriate pain scale  - Administer analgesics based on type and severity of pain and evaluate response  - Implement non-pharmacological measures as appropriate and evaluate response  - Consider cultural and social influences on pain and pain management  - Notify physician/advanced practitioner if interventions unsuccessful or patient reports new pain  Outcome: Progressing     Problem: INFECTION - ADULT  Goal: Absence or prevention of progression during hospitalization  Description: INTERVENTIONS:  - Assess and monitor for signs and symptoms of infection  - Monitor lab/diagnostic results  - Monitor all insertion sites, i e  indwelling lines, tubes, and drains  - Monitor endotracheal if appropriate and nasal secretions for changes in amount and color  - [place] appropriate cooling/warming therapies per order  - Administer medications as ordered  - Instruct and encourage patient and family to use good hand hygiene technique  - Identify and instruct in appropriate isolation precautions for identified infection/condition  Outcome: Progressing  Goal: Absence of fever/infection during neutropenic period  Description: INTERVENTIONS:  - Monitor WBC    Outcome: Progressing     Problem: SAFETY ADULT  Goal: Patient will remain free of falls  Description: INTERVENTIONS:  - Educate patient/family on patient safety including physical limitations  - Instruct patient to call for assistance with activity   - Consult OT/PT to assist with strengthening/mobility   - Keep Call bell within reach  - Keep bed low and locked with side rails adjusted as appropriate  - Keep care items and personal belongings within reach  - Initiate and maintain comfort rounds  - Make Fall Risk Sign visible to staff  - Offer Toileting every 2 Hours, in advance of need  - Initiate/Maintain bed alarm  - Obtain necessary fall risk management equipment:   - Apply yellow socks and bracelet for high fall risk patients  - Consider moving patient to room near nurses station  Outcome: Progressing  Goal: Maintain or return to baseline ADL function  Description: INTERVENTIONS:  -  Assess patient's ability to carry out ADLs; assess patient's baseline for ADL function and identify physical deficits which impact ability to perform ADLs (bathing, care of mouth/teeth, toileting, grooming, dressing, etc )  - Assess/evaluate cause of self-care deficits   - Assess range of motion  - Assess patient's mobility; develop plan if impaired  - Assess patient's need for assistive devices and provide as appropriate  - Encourage maximum independence but intervene and supervise when necessary  - Involve family in performance of ADLs  - Assess for home care needs following discharge   - Consider OT consult to assist with ADL evaluation and planning for discharge  - Provide patient education as appropriate  Outcome: Progressing  Goal: Maintains/Returns to pre admission functional level  Description: INTERVENTIONS:  - Perform BMAT or MOVE assessment daily    - Set and communicate daily mobility goal to care team and patient/family/caregiver  - Collaborate with rehabilitation services on mobility goals if consulted  - Perform Range of Motion 3 times a day  - Reposition patient every  hours    - Dangle patient  times a day  - Stand patient  times a day  - Ambulate patient  times a day  - Out of bed to chair  times a day   - Out of bed for meals  times a day  - Out of bed for toileting  - Record patient progress and toleration of activity level   Outcome: Progressing     Problem: DISCHARGE PLANNING  Goal: Discharge to home or other facility with appropriate resources  Description: INTERVENTIONS:  - Identify barriers to discharge w/patient and caregiver  - Arrange for needed discharge resources and transportation as appropriate  - Identify discharge learning needs (meds, wound care, etc )  - Arrange for interpretive services to assist at discharge as needed  - Refer to Case Management Department for coordinating discharge planning if the patient needs post-hospital services based on physician/advanced practitioner order or complex needs related to functional status, cognitive ability, or social support system  Outcome: Progressing     Problem: Prexisting or High Potential for Compromised Skin Integrity  Goal: Skin integrity is maintained or improved  Description: INTERVENTIONS:  - Identify patients at risk for skin breakdown  - Assess and monitor skin integrity  - Assess and monitor nutrition and hydration status  - Monitor labs   - Assess for incontinence   - Turn and reposition patient  - Assist with mobility/ambulation  - Relieve pressure over bony prominences  - Avoid friction and shearing  - Provide appropriate hygiene as needed including keeping skin clean and dry  - Evaluate need for skin moisturizer/barrier cream  - Collaborate with interdisciplinary team   - Patient/family teaching  - Consider wound care consult   Outcome: Progressing     Problem: Nutrition/Hydration-ADULT  Goal: Nutrient/Hydration intake appropriate for improving, restoring or maintaining nutritional needs  Description: Monitor and assess patient's nutrition/hydration status for malnutrition  Collaborate with interdisciplinary team and initiate plan and interventions as ordered  Monitor patient's weight and dietary intake as ordered or per policy  Utilize nutrition screening tool and intervene as necessary  Determine patient's food preferences and provide high-protein, high-caloric foods as appropriate       INTERVENTIONS:  - Monitor oral intake, urinary output, labs, and treatment plans  - Assess nutrition and hydration status and recommend course of action  - Evaluate amount of meals eaten  - Assist patient with eating if necessary   - Allow adequate time for meals  - Recommend/ encourage appropriate diets, oral nutritional supplements, and vitamin/mineral supplements  - Order, calculate, and assess calorie counts as needed  - Recommend, monitor, and adjust tube feedings and TPN/PPN based on assessed needs  - Assess need for intravenous fluids  - Provide specific nutrition/hydration education as appropriate  - Include patient/family/caregiver in decisions related to nutrition  Outcome: Progressing     Problem: MOBILITY - ADULT  Goal: Maintain or return to baseline ADL function  Description: INTERVENTIONS:  -  Assess patient's ability to carry out ADLs; assess patient's baseline for ADL function and identify physical deficits which impact ability to perform ADLs (bathing, care of mouth/teeth, toileting, grooming, dressing, etc )  - Assess/evaluate cause of self-care deficits   - Assess range of motion  - Assess patient's mobility; develop plan if impaired  - Assess patient's need for assistive devices and provide as appropriate  - Encourage maximum independence but intervene and supervise when necessary  - Involve family in performance of ADLs  - Assess for home care needs following discharge   - Consider OT consult to assist with ADL evaluation and planning for discharge  - Provide patient education as appropriate  Outcome: Progressing  Goal: Maintains/Returns to pre admission functional level  Description: INTERVENTIONS:  - Perform BMAT or MOVE assessment daily    - Set and communicate daily mobility goal to care team and patient/family/caregiver  - Collaborate with rehabilitation services on mobility goals if consulted  - Perform Range of Motion  times a day  - Reposition patient every  hours    - Dangle patient  times a day  - Stand patient  times a day  - Ambulate patient  times a day  - Out of bed to chair  times a day   - Out of bed for meals times a day  - Out of bed for toileting  - Record patient progress and toleration of activity level   Outcome: Progressing

## 2023-03-01 NOTE — PCC OCCUPATIONAL THERAPY
Pt is demonstrating good progress with occupational therapy and is progressing toward long term goals for ADL, IADL, and functional transfers/mobility  Pts long term goals for ADLs are Independent with Rolling Walker  Pt continues to present with impairments in activity tolerance, endurance, standing balance/tolerance, and coping skills  Occupational performance remains limited by fatigue, SOB, decreased caregiver support, risk for falls, and home environment  Pt will continue to benefit from skilled acute rehab OT services to address above mentioned barriers and maximize functional independence in baseline areas of occupation to meet established treatment goals with overall decreased burden of care  Plan of care to continue to focus on ADL Retraining, LHAE education/training, IADL training, Functional Transfers, Functional Cognition, Standing tolerance, Standing balance, DME training/education, Family training/education, Energy conservation training/education, healthy coping education, Leisure and social pursuits, and community re-integration  Over the last week, pt demo's progress with self care tasks, func transfers with/w/o RW  Goals for the upcoming week are: increasing overall functional activity tolerance, increase standing tolerance for self care tasks, increase strength and endurance  D/C date: Tentatively Friday, 3/10/23; no additional OT needs at this time

## 2023-03-01 NOTE — PROGRESS NOTES
03/01/23 0830   Pain Assessment   Pain Assessment Tool 0-10   Pain Score 3   Pain Onset/Description Descriptor: Discomfort   Restrictions/Precautions   Precautions Fall Risk;Contact/isolation;Supervision on toilet/commode;Multiple lines   Weight Bearing Restrictions No   ROM Restrictions No   Cognition   Overall Cognitive Status WFL   Arousal/Participation Alert; Cooperative   Attention Within functional limits   Orientation Level Oriented X4   Memory Within functional limits   Following Commands Follows all commands and directions without difficulty   Subjective   Subjective Pt reports feeling weak today but still agreeable to participate in skilled PT session   Lying to Sitting on Side of Bed   Type of Assistance Needed Supervision   Lying to Sitting on Side of Bed CARE Score 4   Sit to Stand   Type of Assistance Needed Incidental touching   Sit to Stand CARE Score 4   Bed-Chair Transfer   Type of Assistance Needed Incidental touching;Physical assistance   Physical Assistance Level 25% or less   Comment SPT   Chair/Bed-to-Chair Transfer CARE Score 3   Curb or Single Stair   Style negotiated Single stair   Type of Assistance Needed Incidental touching; Adaptive equipment   Physical Assistance Level 26%-50%   Comment using RW on  steps to mimic home set up   1 Step (Curb) CARE Score 3   4 Steps   Reason if not Attempted Safety concerns   4 Steps CARE Score 88   12 Steps   Reason if not Attempted Safety concerns   12 Steps CARE Score 88   Stairs   Type Stairs   # of Steps 3   Weight Bearing Precautions Fall Risk   Assist Devices Roller Walker   Therapeutic Interventions   Strengthening seated LAQ x15; marches x15; ball ADD x20; yellow TB ABD x20; ankle pump x25   Flexibility b/l HS & gastroc stretch [id number]   Equipment Use   NuStep lvl 1 for 5min   Assessment   Treatment Assessment pt engaged in 60min skilled PT session focused on inc strength & reviewing ADRIAN home   Donned pt ALVARO stockings at beginning of session  Perfomred manual LE stretching to inc flexibility  Performed 6''  steps x2 using RW ModA to mimic [facility]RIAN home  Pt used step to pattern  Ascending using walker on angle rested on 1st & 2nd step  Explained to pt that it would be safer to ascend placing RW on top step  Pt descend backward keeping RW on top step until off steps  Performed LE exercises seated (see above) to inc strength  Pt performed NuStep lvl 1 for 5min  Pt c/o feeling weak today & HR was tacky throughout session (see vitals)  Pt continues to be limited by decreased strength & endurance  In next session continue to work on inc endurance with amb  Family/Caregiver Present no   Problem List Decreased strength;Decreased range of motion;Decreased endurance; Impaired balance;Decreased mobility;Pain   Barriers to Discharge Inaccessible home environment;Decreased caregiver support   PT Barriers   Functional Limitation Car transfers; Ramp negotiation;Stair negotiation;Standing;Transfers; Walking   Plan   Treatment/Interventions ADL retraining;Functional transfer training; Therapeutic exercise; Endurance training;Equipment eval/education;Patient/family training;Bed mobility; Compensatory technique education   Progress Progressing toward goals   Recommendation   PT Discharge Recommendation Home with home health rehabilitation   PT Therapy Minutes   PT Time In 0830   PT Time Out 0930   PT Total Time (minutes) 60   PT Mode of treatment - Individual (minutes) 60   PT Mode of treatment - Concurrent (minutes) 0   PT Mode of treatment - Group (minutes) 0   PT Mode of treatment - Co-treat (minutes) 0   PT Mode of Treatment - Total time(minutes) 60 minutes   PT Cumulative Minutes 400   Therapy Time missed   Time missed?  No

## 2023-03-01 NOTE — PROGRESS NOTES
PM&R PROGRESS NOTE:  Ophelia Robles 61 y o  male MRN: [id number]  Unit/Bed#: -00 Encounter: [id number]        Rehabilitation Diagnosis: Impairment of mobility, safety and Activities of Daily Living (ADLs) due to Debility:  16  Debility (Non-cardiac/Non-pulmonary)    HPI: Tre Adkins is a 61 y o  male with a history of hyperlipidemia, hypertension, GERD, ventral hernia, colon adenocarcinoma that initially presented to [facility] for an elective surgical procedure with Dr Michael Dorman on 11/7/22  Patient presented to hem/onc on  9/22 when CT abdomen revealed transverse colonic apple core lesion and innumerable hepatic metastases  MRI revealed multiple hepati metastasis with smaller lesions in left lobe and larger lesions on the right lobe  On 11/7, patient underwent exploratory laparotomy, segment 3 liver resection, ablation, intraoperative ultrasound of the liver  This was complicated by left upper quadrant hematoma, imaging showed suspected leak from transverse colon anastomosis  On 11/16, patient underwent exploratory lap revealing hematoma, defect in transverse colon anastomosis with extravasation of succus and a dilated cecum requiring resection  He had a right hemicolectomy, left in discontinuity and temporary abdominal closure device  On 11/17, patient underwent re-exploratory, partial descending colectomy, primary colonic anastomosis created with diverting loop ileostomy and midline wound vac placement  An intra-abdominal abscess was positive for ESBL and patient completed prolonged course of antibiotics  He was discharged to SNF on 12/3 and returned to hospital on 12/4 for increased abdominal pain  Patient monitored off of antibiotics  On 12/6 patient was noted with an increased creatinine level, received IVF bolus with improvement  Patient developed tachycardia, increased abdominal pain, and incision with yellow drainage noted  CT AP showed abdominal abscess and distended gallbladder   ID was consulted, patient was continued on IV antibiotics through 12/15  Patient went to IR for percutaneous cholecystostomy tube insertion and hepatectomy abscess drainage  Nephrology consulted and initiated sodium bicarb gtt and IV albumin  On 12/14, patient midline/left chest wall pain, below abscess drain  Cardiac work-up completed  Troponin level 57 and CXR showed progressive bilateral opacities suspicious for CHF and small left pleural effusion  Cariology consulted with chest pain appearing musculoskeletal no further work up warranted  Patient was deemed medically stable and admitted to [address] [place] [address] on 12/14/22  He was discharged from [facility] on 01/17/23  Patient present to [facility] on 02/07 with increased weakness for two weeks with 2 falls noted  No injury noted  He was found to be septic with the main source of midline abdominal wound which was with foul smelling drainage  CT showed improvement in fluid collection at the left liver segment, perisplenic, left retroperitoneum, interior to the spleen  Patient was treated with broad spectrum antibiotics and eventually placed on Ertapenem  Hospital course complicated by fevers and leukocytosis  Repeat imaging concerning for cholecystitis  Patient underwent cystostomy tube exchange and Pattock drain was removed on 02/24  The patient was evaluated by the Rehabilitation team and deemed an appropriate candidate for an inpatient acute rehabilitation program  Patient was admitted on 02/24/23         SUBJECTIVE: Patient seen face to face  No acute issues overnight  Reports extreme fatigue today  Denies pain, abdominal tenderness at per chol insertion site  He was orthostatic with therapies and started on midodrine by IM  Cholecystostomy tube draining tan/green drainage  No output recorded for 24 hour period  Insertion site remains tender, visualized- no signs of infection, healing  Reports mild indigestion today   Denies chest pain, shortness of breath, fever, chills, nausea    24 hr total  Cholecystostomy tube- 0 cc  Ileostomy- 500 cc     ASSESSMENT: Stable, progressing    PLAN:  - mild indigestion without nausea- Tums ordered  - orthostatic hypotension- midodrine 2 5 mg BID  - CBC, BMP 03/03  - continue with current medical and rehabilitation program      Rehabilitation  Team conference: The team discussed patient's functional status, goals, and barriers  • Functional deficits:  Mobility, self care  • Continue current rehabilitation plan of care to maximize function      • Functional update:   Physical Therapy Occupational Therapy Speech Therapy   Weight Bearing Status: Full Weight Bearing  Transfers: Minimal Assistance, Moderate Assistance  Bed Mobility: Moderate Assistance  Amulation Distance (ft): 91 feet  Ambulation: Moderate Assistance  Assistive Device for Ambulation: Hand Hold Assistance  Wheelchair Mobility Distance: 0 ft  Number of Stairs: 6  Assistive Device for Stairs: Bilateral Hand Rails  Stair Assistance: Minimal Assistance  Discharge Recommendations: Home with:  [address] with[de-identified] Family Support   Eating: Independent  Grooming: Supervision  Bathing: Minimal Assistance  Bathing: Minimal Assistance  Upper Body Dressing: Incidental Touching  Lower Body Dressing: Incidental Touching  Toileting: Maximum Assistance  Toilet Transfer: Minimal Assistance  Cognition: Within Defined Limits  Orientation: Person, Time, Place, Situation               • Estimated Discharge: anticipated 3/10 home with homecare    Pain  • Tylenol, oxycodone    DVT prophylaxis  • Lovenox    Bladder plan  • Continent    Bowel plan  • Ileostomy    Malignant neoplasm of transverse colon [facility])  Assessment & Plan  Complex history with chronological details below:    2/22: S/P diverting colostomy, liver biopsy +Chemo tx  11/7/22: Hepatic resection and colostomy reversal  11/16/22: Exploratory laparotomy with bowel resection and VAC placement   11/17/22: Right hemicolectomy, partial descending colon resection, colocolonic anastomosis with loop ileosotmy and midline VAC placement  12/20/22: Drains placed, total of 3 drains + perc alvina tube + ileostomy  1/31/23: 2 of 3 drains removed  This hospital stay had fever/leukocytosis/sepsis, started Meropenem and then to Vancomycin and Ertapenem  Wound cx again = Ecoli ESBL  LD Ertapenem 2/14, Vancomycin 2/16 2/24: Abscessogram of midline drain showed minimal persistent collection and the midline catheter was removed  Perc Alvina tube exchanged  • Patient does not wish to follow with [place] [facility] anymore as it is too far of a drive; would like to establish with [place] [facility] (already follows with Dr Johan Sorto)  • Follows with Dr Shanita Rojas as outpt    * Sepsis [facility])  Assessment & Plan  Noted the development of foul-smelling drainage and increasing pain from his chronic abdominal wound with leukocytosis, tachypnea, tachycardia  • Purulent drainage from abdominal wound  • Hx of colon CA w/ extensive abd surgical- adenocarcinoma of transverse colon section  • Patient has several abdominal drains in place, with CT showing improvement in the fluid collections at the left liver lobe, perisplenic area, and the left retroperitoneum inferior to the spleen  • Completed Vancomycin and Ertapenem  • S/p bedside midline wound debridement + wet dressing 2/8 with general surgery  • S/p IR tube check 2/17/2023, drains kept in place  Discussed with IR, follow up in 1 month  • Given ongoing low grade fever and worsening leukocytosis, ordered repeat blood cultures and repeat CT a/p    CT a/p showing moderate R pleural effusion, imaging concerning for cholecystitis  Discussed case with surgery, IR  IR will reposition drain today- Alvina tube check showed retraction of the alvina tube   Interval occlusion of the cystic duct   Dino-enteric fistula persisted   Exchange of alvina tube 2/24  • Continue to monitor off of antibiotics, low threshold to re-consult ID, but stable at this time   • Debility from Sepsis in addition to 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  • Pain control, anxiety management  • PT/OT    Chronic bilateral pleural effusions  Assessment & Plan  Hx of pleural effusions with   Interval worsening of moderate right and small left pleural effusions  There is associated bibasilar atelectasis based on CTAP on 2/22  Monitor oxygen and breathing, may benefit from thoracentesis    Depression  Assessment & Plan  Continue Cymbalta  Provide supportive counseling and encouragement, easily tearful  Neuropsychology consultation for support    Cholecystitis, unspecified  Assessment & Plan  Subacute at this point, but still requiring per radha tube  • s/p percutaneous tube placement 12/8/22 asnd recent exchange on 2/24/23  • CT scan was concerning for cholecystitis --> to IR 2/24 = showed retraction of the radha tube and interval occlusion of the cystic duct   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  Radha tube was exchanged  • Monitor output and pain    Abdominal wound dehiscence  Assessment & Plan  Midline abdominal wound with initially foul smelling drainage  Now improved per patient with red granular base and slough noted  Santyl and wet to dry dressing was done in acute care  Consult wound care   Daily dressing changes    Nephrolithiasis  Assessment & Plan  RUQ US revealed 7 mm nonobstructing right intrarenal calculus  No hydronephrosis noted on CT a/p  Asymptomatic at this time  • Flomax  • Monitor outpt, symptoms    Hyponatremia  Assessment & Plan  • Mild, most recently 131 on 2/27  • Thompson likely2/2 SIADH in setting of malignancy  • Sodium chloride tab 1 with breakfast  • Monitor BMP    Elevated alkaline phosphatase level  Assessment & Plan  Chronically elevated likely in setting of known hepatic metastasis  • RUQ US Decompressed gallbladder around a cholecystostomy tube, limiting evaluation  Hepatomegaly   Heterogeneous echotexture of the liver in this patient with known metastatic disease  7 mm nonobstructing right intrarenal calculus  • Monitor with am CMP    Leukocytosis  Assessment & Plan  WBC 13 07 (previous 13 41)  Mildly above normal, trendiong down, however has a history of spiking and dropping  Monitor labs however need to follow clinical exam and vitals as it may spike and drop again  Monitor off antibiotics  ID following  If Temp >101 blood cultures and consider CT A/P    Abscess  Assessment & Plan  Smaller abscesses on recent studies now with no active drains  At risk for further complications  Lower theshold for abdominal CT if develops fevers or leukocytosis    Acute on chronic kidney failure [facility])  Assessment & Plan  Lab Results   Component Value Date    EGFR 56 02/24/2023    EGFR 52 02/23/2023    EGFR 52 02/22/2023    CREATININE 1 36 (H) 02/24/2023    CREATININE 1 45 (H) 02/23/2023    CREATININE 1 45 (H) 02/22/2023     • POA with creatinine 2 52; baseline 1-1 3  • Suspecting possible multifactorial cause in setting of dehydration and sepsis  • Improving back into baseline levels, Continue to monitor on labs, minimize any relative hypotension  Avoid nephrotoxic agents  Anemia  Assessment & Plan  Hbg 8 3 (previous 8 7, 7 2, 8 1)  Required transfusion on 2/16  Order type and screen, 1 unit pRBC (2/26)  Monitor CBC    Mixed hyperlipidemia  Assessment & Plan  Continue statin    Benign essential hypertension  Assessment & Plan  Monitor pressures in therapy    Type 2 diabetes mellitus without complication, with long-term current use of insulin [facility])  Assessment & Plan  Lab Results   Component Value Date    HGBA1C 8 0 (H) 09/26/2022       Recent Labs     02/24/23  0839 02/24/23  1137 02/24/23  1626 02/24/23  2040   POCGLU 106 105 152* 120     • Home:  Lantus 8U qam/Lispro 4U TID  • Here:  Lantus 5U qhs/Lispro 4U TID     • Has a DEXCOM meter and a regular meter at home  • Continue DM diet and QID Accuchecks/SSI    Appreciate IM consultants medical co-management  Labs, medications, and imaging reviewed  ROS:  Review of Systems   A 10 point review of systems was negative except for what is noted in the HPI  OBJECTIVE:   /79 (BP Location: Right arm)   Pulse 95   Temp 97 7 °F (36 5 °C) (Oral)   Resp 19   Ht 5' 9" (1 753 m)   Wt 80 1 kg (176 lb 9 4 oz)   SpO2 99%   BMI 26 08 kg/m²     Physical Exam  Constitutional:       Appearance: Normal appearance  HENT:      Head: Normocephalic and atraumatic  Mouth/Throat:      Mouth: Mucous membranes are moist    Cardiovascular:      Rate and Rhythm: Normal rate and regular rhythm  Pulses: Normal pulses  Heart sounds: Normal heart sounds  Pulmonary:      Effort: Pulmonary effort is normal       Breath sounds: Normal breath sounds  Abdominal:      General: Bowel sounds are normal       Palpations: Abdomen is soft  Tenderness: There is abdominal tenderness  Musculoskeletal:         General: Normal range of motion  Cervical back: Normal range of motion  Skin:     General: Skin is warm and dry  Capillary Refill: Capillary refill takes less than 2 seconds  Neurological:      Mental Status: He is alert and oriented to person, place, and time     Psychiatric:         Mood and Affect: Mood normal          Judgment: Judgment normal         Lab Results   Component Value Date    WBC 13 07 (H) 02/28/2023    HGB 8 3 (L) 02/28/2023    HCT 26 6 (L) 02/28/2023    MCV 84 02/28/2023     02/28/2023     Lab Results   Component Value Date    SODIUM 131 (L) 03/01/2023    K 3 9 03/01/2023     03/01/2023    CO2 20 (L) 03/01/2023    BUN 14 03/01/2023    CREATININE 1 11 03/01/2023    GLUC 95 03/01/2023    CALCIUM 8 3 03/01/2023     Lab Results   Component Value Date    INR 1 32 (H) 02/07/2023    INR 1 12 12/19/2022    INR 1 10 11/12/2022    PROTIME 16 1 (H) 02/07/2023    PROTIME 14 7 (H) 12/19/2022    PROTIME 14 4 11/12/2022       Current Facility-Administered Medications:   • acetaminophen (TYLENOL) tablet 650 mg, 650 mg, Oral, Q4H PRN, Jaqui Valdez DO, 650 mg at 02/25/23 2115  •  aspirin chewable tablet 81 mg, 81 mg, Oral, Daily, Jaqui Valdez DO, 81 mg at 03/01/23 0940  •  atorvastatin (LIPITOR) tablet 40 mg, 40 mg, Oral, Daily, Jaqui Valdez DO, 40 mg at 03/01/23 0940  •  calcium carbonate (TUMS) chewable tablet 500 mg, 500 mg, Oral, TID PRN, GAURANG Keith, 500 mg at 03/01/23 1715  •  collagenase (SANTYL) ointment, , Topical, Daily, Jaqui Valdez DO, Given at 03/01/23 1501  •  DULoxetine (CYMBALTA) delayed release capsule 30 mg, 30 mg, Oral, Daily, Jaqui Valdez DO, 30 mg at 03/01/23 0940  •  enoxaparin (LOVENOX) subcutaneous injection 40 mg, 40 mg, Subcutaneous, Q24H SHANNON, GAURANG Keith, 40 mg at 03/01/23 0940  •  hydrOXYzine HCL (ATARAX) tablet 10 mg, 10 mg, Oral, HS, Jaqui Valdez DO, 10 mg at 02/28/23 2237  •  insulin glargine (LANTUS) subcutaneous injection 5 Units 0 05 mL, 5 Units, Subcutaneous, HS, Jaqui Valdez DO, 5 Units at 02/28/23 2238  •  insulin lispro (HumaLOG) 100 units/mL subcutaneous injection 1-6 Units, 1-6 Units, Subcutaneous, TID AC, 2 Units at 02/28/23 1232 **AND** Fingerstick Glucose (POCT), , , 4x Daily AC and at bedtime, Jaqui Valdez DO  •  insulin lispro (HumaLOG) 100 units/mL subcutaneous injection 1-6 Units, 1-6 Units, Subcutaneous, HS, Jaqui Valdez DO, 1 Units at 02/28/23 2238  •  insulin lispro (HumaLOG) 100 units/mL subcutaneous injection 4 Units, 4 Units, Subcutaneous, TID With Meals, Jaqui Valdez DO, 4 Units at 03/01/23 1715  •  melatonin tablet 6 mg, 6 mg, Oral, HS PRN, Jaqui Valdez DO, 6 mg at 02/28/23 2238  •  methocarbamol (ROBAXIN) tablet 500 mg, 500 mg, Oral, BID PRN, Jaqui Valdez DO, 500 mg at 02/24/23 2120  •  [START ON 3/2/2023] midodrine (PROAMATINE) tablet 2 5 mg, 2 5 mg, Oral, BID ABIGAIL, GAURANG Lott  •  ondansetron (ZOFRAN) injection 4 mg, 4 mg, Intravenous, Q4H PRN, Jaqui Valdez DO  •  oxyCODONE (ROXICODONE) IR tablet 2 5 mg, 2 5 mg, Oral, Q4H PRN, Thena Ocate, DO  •  oxyCODONE (ROXICODONE) IR tablet 5 mg, 5 mg, Oral, Q4H PRN, Thena Ocate, DO, 5 mg at 02/28/23 2238  •  pantoprazole (PROTONIX) EC tablet 40 mg, 40 mg, Oral, BID AC, Thena Ocate, DO, 40 mg at 03/01/23 1612  •  simethicone (MYLICON) chewable tablet 80 mg, 80 mg, Oral, 4x Daily (with meals and at bedtime), Thena Ocate, DO, 80 mg at 03/01/23 1715  •  sodium chloride tablet 1 g, 1 g, Oral, BID With Meals, GAURANG Hurley  •  tamsulosin (FLOMAX) capsule 0 4 mg, 0 4 mg, Oral, Daily With Ahez Bell, DO, 0 4 mg at 03/01/23 1715    Past Medical History:   Diagnosis Date   • Abdominal pain 03/12/2022   • Acute renal failure ([place] [address] )     [id number] resolved   • Acute respiratory failure with hypoxia ([place] [address] ) 11/12/2022   • Bacteremia 11/12/2022   • Cancer ([place] [address] )    • Diabetes mellitus ([facility] )    • Enteritis 08/23/2016   • Flash pulmonary edema ([facility] ) 11/12/2022   • Gastroparesis due to DM ([facility] ) 08/23/2016   • GERD (gastroesophageal reflux disease)    • Hernia, ventral 08/04/2016   • Hyperlipidemia    • Hypertension    • Morbid obesity ([facility] ) 04/17/2018   • Postoperative visit 03/02/2022   • SIRS (systemic inflammatory response syndrome) ([place] [address] ) 03/12/2022   • Snoring    • Stage 3a chronic kidney disease ([place] [address] ) 02/19/2022       Patient Active Problem List    Diagnosis Date Noted   • Malignant neoplasm of transverse colon ([facility] ) 03/01/2022   • Sepsis ([facility] ) 12/17/2022   • 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 liver ([facility] ) 03/02/2022   • Abdominal wound dehiscence 02/24/2023   • Cholecystitis, unspecified 02/24/2023   • Depression 02/24/2023   • Chronic bilateral pleural effusions 02/24/2023   • Nephrolithiasis 02/09/2023   • Abnormal CT scan 02/07/2023   • Hyponatremia 02/07/2023   • Dehiscence of incision 12/30/2022   • Elevated alkaline phosphatase level 12/30/2022   • Leukocytosis 12/29/2022   • Abscess 12/27/2022   • Acute pain 12/27/2022 • Severe protein-calorie malnutrition ([facility] ) 12/14/2022   • Acute on chronic kidney failure ([place] [address] ) 12/14/2022   • MR (mitral regurgitation) 11/12/2022   • ESBL (extended spectrum beta-lactamase) producing bacteria infection 11/12/2022   • Encephalopathy 11/09/2022   • Cervical radiculopathy 10/26/2022   • Other fatigue 06/15/2022   • Colostomy prolapse ([place] [address] ) 05/27/2022   • Colon cancer metastasized to liver ([place] [address] ) 03/12/2022   • Iron deficiency anemia, unspecified 03/01/2022   • Thrombocytosis 02/21/2022   • Hypokalemia 02/19/2022   • Transaminitis 02/19/2022   • Type 2 diabetes mellitus with hyperlipidemia ([place] [address] ) 02/18/2022   • Anemia 02/18/2022   • Left ureteral calculus 01/30/2020   • Incarcerated umbilical hernia 41/15/4149   • Testicular hypogonadism 06/19/2017   • Low testosterone 05/30/2017   • Type 2 diabetes mellitus without complication, with long-term current use of insulin ([place] [address] ) 08/23/2016   • Benign essential hypertension 08/23/2016   • Mixed hyperlipidemia 08/23/2016   • Erectile dysfunction 07/11/2016   • Obesity (BMI 30-39 9) 07/11/2016          GAURANG Zamora  Physical Medicine and Chris 12    I have spent a total time of 30 minutes on 03/01/23 in caring for this patient including Patient and family education, Counseling / Coordination of care, Documenting in the medical record and Reviewing / ordering tests, medicine, procedures  team conference meeting

## 2023-03-01 NOTE — PROGRESS NOTES
OT Daily Treatment Note       03/01/23 1300   Pain Assessment   Pain Assessment Tool 0-10   Pain Score No Pain   Restrictions/Precautions   Precautions Fall Risk;Contact/isolation;Supervision on toilet/commode;Multiple lines   Lifestyle   Autonomy "I'm just scared that when I go home I am going to go downhill again"   Oral Hygiene   Type of Assistance Needed Set-up / clean-up   Physical Assistance Level No physical assistance   Comment unable to complete in stance at sink 2* orthostatic BP's  completed seated in WC at sink requiring s/u only  Oral Hygiene CARE Score 5   Shower/Bathe Self   Type of Assistance Needed Incidental touching   Physical Assistance Level No physical assistance   Comment CGA seated/standing sponge bath  limited tolerance to complete in stance 2* orthostatic BP's and fatigue   complete distal LE bathing with leg crossing method   Shower/Bathe Self CARE Score 4   Bathing   Assessed Bath Style Sponge Bath   Anticipated D/C Bath Style Sponge Bath   Positioning Seated;Standing   Tub/Shower Transfer   Reason Not Assessed Sponge Bath   Upper Body Dressing   Type of Assistance Needed Set-up / clean-up   Physical Assistance Level No physical assistance   Comment gathered clothing items for pt   Upper Body Dressing CARE Score 5   Lower Body Dressing   Type of Assistance Needed Supervision   Physical Assistance Level No physical assistance   Comment CS when donning over hips in stance with very brief stance 2* orthostatic BP's and fatigue   Lower Body Dressing CARE Score 4   Putting On/Taking Off Footwear   Type of Assistance Needed Physical assistance   Physical Assistance Level Total assistance   Comment TA to don/doff TEDs and gripsocks   Putting On/Taking Off Footwear CARE Score 1   Sit to Lying   Type of Assistance Needed Supervision   Physical Assistance Level No physical assistance   Comment inc time required   Sit to Lying CARE Score 4   Sit to Stand   Type of Assistance Needed Supervision Physical Assistance Level No physical assistance   Comment CS with no AD  brief periods of stance 2* orthostatic BPs and extreme fatigue  Sit to Stand CARE Score 4   Bed-Chair Transfer   Type of Assistance Needed Incidental touching   Physical Assistance Level No physical assistance   Comment CGA no AD SPT   Chair/Bed-to-Chair Transfer CARE Score 4   Cognition   Overall Cognitive Status WFL   Arousal/Participation Alert; Cooperative   Attention Within functional limits   Orientation Level Oriented X4   Memory Within functional limits   Following Commands Follows all commands and directions without difficulty   Additional Activities   Additional Activities Other (Comment)   Additional Activities Comments OT provided pt with information regarding DC date set for next Friday  Pt was initially disappointed stating he was hoping for 3 weeks but verbalized understanding that with his progress, he can understand why the goal is set for next Friday  Towards the end of the session, pt became unexpectedly emotional/tearful stating that he is scared to go home and regress since going home initially from [place], he had a "downhill slope" and "became very sick" so he is concerned that if he were to go home and regress, he won't be strong enough to strat chemotherapy and it will lead to his "inevitable death"  OT provided emotional support which pt was appreciative of and OT focused on the pts progress thus far as a means to motivate the pt to continue working towards his goal of [address] home without an AD next week  Pt would benefit from neuropsych if not already for coping strategies and CBT  Activity Tolerance   Activity Tolerance Patient tolerated treatment well   Assessment   Treatment Assessment Pt participated in skilled OT session with focus on ADL retraining, functional transfer training, continued education and energy conservation  See flowsheet for details of session and current functional status   Pt is limited by weakness, impaired balance, decreased endurance, increased fall risk, pain, SOB upon exertion and decreased strength and requires skilled OT services to increase independence and safety with ADL completion in prep for DC home  Plan to continue ADL retraining, functional transfer training, UE strengthening/ROM, endurance training, Pt/caregiver education, equipment evaluation/education, compensatory technique education, continued education, energy conservation and activity engagement  to address barriers mentioned above  Prognosis Good   Problem List Decreased strength;Decreased range of motion;Decreased endurance; Impaired balance;Decreased mobility;Pain   Barriers to Discharge Inaccessible home environment;Decreased caregiver support   Plan   Treatment/Interventions ADL retraining;Functional transfer training; Therapeutic exercise; Endurance training;Patient/family training; Compensatory technique education   Progress Progressing toward goals   OT Therapy Minutes   OT Time In 1300   OT Time Out 1400   OT Total Time (minutes) 60   OT Mode of treatment - Individual (minutes) 60   OT Mode of treatment - Concurrent (minutes) 0   OT Mode of treatment - Group (minutes) 0   OT Mode of treatment - Co-treat (minutes) 0   OT Mode of Treatment - Total time(minutes) 60 minutes   OT Cumulative Minutes 330   Therapy Time missed   Time missed?  No

## 2023-03-01 NOTE — PROGRESS NOTES
03/01/23 1100   Pain Assessment   Pain Assessment Tool 0-10   Pain Score No Pain   Restrictions/Precautions   Precautions Fall Risk;Contact/isolation;Supervision on toilet/commode;Multiple lines   Weight Bearing Restrictions No   ROM Restrictions No   Cognition   Overall Cognitive Status WFL   Arousal/Participation Alert; Cooperative   Attention Within functional limits   Orientation Level Oriented X4   Memory Within functional limits   Following Commands Follows all commands and directions without difficulty   Subjective   Subjective pt agreeable to participate in skilled PT session   Sit to Stand   Type of Assistance Needed Incidental touching   Sit to Stand CARE Score 4   Bed-Chair Transfer   Type of Assistance Needed Physical assistance   Physical Assistance Level 25% or less   Comment SPT   Chair/Bed-to-Chair Transfer CARE Score 3   Walk 10 Feet   Type of Assistance Needed Physical assistance   Physical Assistance Level Total assistance   Comment HHA & second PTA for balance with WC follow   Walk 10 Feet CARE Score 1   Walk 50 Feet with Two Turns   Type of Assistance Needed Physical assistance   Physical Assistance Level Total assistance   Comment HHA & second PTA for balance with WC follow   Walk 50 Feet with Two Turns CARE Score 1   Walk 150 Feet   Comment fatigued this session at 76'   Walking 10 Feet on Uneven Surfaces   Type of Assistance Needed Physical assistance   Physical Assistance Level 26%-50%   Comment HHA with LOBx1   Walking 10 Feet on Uneven Surfaces CARE Score 3   Ambulation   Primary Mode of Locomotion Prior to Admission Walk   Distance Walked (feet) 75 ft  (x4)   Assist Device Hand Hold   Does the patient walk? 2  Yes   Assessment   Treatment Assessment Pt engaged in 30min skilled PT session focused on inc endurance  Pt amb 75'x4 TA with HHA & second PTA for balance with WC follow  Pt reports being weaker today & was able to notify when he needed to sit   Pt amb over 10' floor mat ModA with HHA & LOBx1  Pt continues to be limited by decreased strength & endurance  In next session trial FF  Family/Caregiver Present no   Problem List Decreased strength;Decreased range of motion;Decreased endurance; Impaired balance;Decreased mobility;Pain   Barriers to Discharge Inaccessible home environment;Decreased caregiver support   PT Barriers   Functional Limitation Car transfers; Ramp negotiation;Stair negotiation;Standing;Transfers; Walking   Plan   Treatment/Interventions ADL retraining;Functional transfer training; Therapeutic exercise; Endurance training;Equipment eval/education;Patient/family training;Bed mobility; Compensatory technique education   Progress Progressing toward goals   Recommendation   PT Discharge Recommendation Home with home health rehabilitation   PT Therapy Minutes   PT Time In 1100   PT Time Out 1130   PT Total Time (minutes) 30   PT Mode of treatment - Individual (minutes) 30   PT Mode of treatment - Concurrent (minutes) 0   PT Mode of treatment - Group (minutes) 0   PT Mode of treatment - Co-treat (minutes) 0   PT Mode of Treatment - Total time(minutes) 30 minutes   PT Cumulative Minutes 430   Therapy Time missed   Time missed?  No

## 2023-03-02 LAB
GLUCOSE SERPL-MCNC: 129 MG/DL (ref 65–140)
GLUCOSE SERPL-MCNC: 129 MG/DL (ref 65–140)
GLUCOSE SERPL-MCNC: 172 MG/DL (ref 65–140)
GLUCOSE SERPL-MCNC: 229 MG/DL (ref 65–140)

## 2023-03-02 RX ADMIN — ATORVASTATIN CALCIUM 40 MG: 40 TABLET, FILM COATED ORAL at 08:46

## 2023-03-02 RX ADMIN — PANTOPRAZOLE SODIUM 40 MG: 40 TABLET, DELAYED RELEASE ORAL at 06:39

## 2023-03-02 RX ADMIN — CARBIDOPA AND LEVODOPA 2.5 MG: 50; 200 TABLET, EXTENDED RELEASE ORAL at 07:20

## 2023-03-02 RX ADMIN — INSULIN LISPRO 1 UNITS: 100 INJECTION, SOLUTION INTRAVENOUS; SUBCUTANEOUS at 17:58

## 2023-03-02 RX ADMIN — INSULIN LISPRO 2 UNITS: 100 INJECTION, SOLUTION INTRAVENOUS; SUBCUTANEOUS at 13:36

## 2023-03-02 RX ADMIN — HYDROXYZINE HYDROCHLORIDE 10 MG: 10 TABLET ORAL at 22:04

## 2023-03-02 RX ADMIN — ASPIRIN 81 MG CHEWABLE TABLET 81 MG: 81 TABLET CHEWABLE at 08:46

## 2023-03-02 RX ADMIN — OXYCODONE HYDROCHLORIDE 5 MG: 5 TABLET ORAL at 22:03

## 2023-03-02 RX ADMIN — OXYCODONE HYDROCHLORIDE 5 MG: 5 TABLET ORAL at 06:47

## 2023-03-02 RX ADMIN — SIMETHICONE 80 MG: 80 TABLET, CHEWABLE ORAL at 13:36

## 2023-03-02 RX ADMIN — SIMETHICONE 80 MG: 80 TABLET, CHEWABLE ORAL at 06:39

## 2023-03-02 RX ADMIN — COLLAGENASE SANTYL: 250 OINTMENT TOPICAL at 16:21

## 2023-03-02 RX ADMIN — DULOXETINE HYDROCHLORIDE 30 MG: 30 CAPSULE, DELAYED RELEASE ORAL at 08:46

## 2023-03-02 RX ADMIN — ENOXAPARIN SODIUM 40 MG: 40 INJECTION SUBCUTANEOUS at 08:46

## 2023-03-02 RX ADMIN — SODIUM CHLORIDE 1 G: 1 TABLET ORAL at 17:59

## 2023-03-02 RX ADMIN — INSULIN LISPRO 4 UNITS: 100 INJECTION, SOLUTION INTRAVENOUS; SUBCUTANEOUS at 13:37

## 2023-03-02 RX ADMIN — INSULIN LISPRO 4 UNITS: 100 INJECTION, SOLUTION INTRAVENOUS; SUBCUTANEOUS at 06:44

## 2023-03-02 RX ADMIN — CARBIDOPA AND LEVODOPA 2.5 MG: 50; 200 TABLET, EXTENDED RELEASE ORAL at 11:35

## 2023-03-02 RX ADMIN — SIMETHICONE 80 MG: 80 TABLET, CHEWABLE ORAL at 17:58

## 2023-03-02 RX ADMIN — SODIUM CHLORIDE 1 G: 1 TABLET ORAL at 08:46

## 2023-03-02 RX ADMIN — PANTOPRAZOLE SODIUM 40 MG: 40 TABLET, DELAYED RELEASE ORAL at 17:58

## 2023-03-02 RX ADMIN — INSULIN LISPRO 4 UNITS: 100 INJECTION, SOLUTION INTRAVENOUS; SUBCUTANEOUS at 17:58

## 2023-03-02 RX ADMIN — INSULIN GLARGINE 5 UNITS: 100 INJECTION, SOLUTION SUBCUTANEOUS at 22:05

## 2023-03-02 RX ADMIN — TAMSULOSIN HYDROCHLORIDE 0.4 MG: 0.4 CAPSULE ORAL at 17:58

## 2023-03-02 RX ADMIN — SIMETHICONE 80 MG: 80 TABLET, CHEWABLE ORAL at 22:04

## 2023-03-02 NOTE — PROGRESS NOTES
03/02/23 0830   Pain Assessment   Pain Assessment Tool 0-10   Pain Score No Pain   Restrictions/Precautions   Precautions Fall Risk;Contact/isolation;Supervision on toilet/commode;Multiple lines   Weight Bearing Restrictions No   ROM Restrictions No   Braces or Orthoses Other (Comment)  (TEDS)   Cognition   Overall Cognitive Status WFL   Arousal/Participation Alert; Cooperative   Attention Within functional limits   Orientation Level Oriented X4   Memory Within functional limits   Following Commands Follows all commands and directions without difficulty   Subjective   Subjective pt states he feels better than yesterday  Pt agreeable to perform skilled PT session  Sit to Stand   Type of Assistance Needed Supervision   Comment pt c/o dizziness when standing   Sit to Stand CARE Score 4   Bed-Chair Transfer   Type of Assistance Needed Incidental touching   Comment no AD   Chair/Bed-to-Chair Transfer CARE Score 4   Walk 10 Feet   Type of Assistance Needed Physical assistance; Adaptive equipment   Physical Assistance Level 25% or less   Comment RW   Walk 10 Feet CARE Score 3   Walk 50 Feet with Two Turns   Type of Assistance Needed Physical assistance; Adaptive equipment   Physical Assistance Level 25% or less   Comment RW   Walk 50 Feet with Two Turns CARE Score 3   Walk 150 Feet   Type of Assistance Needed Physical assistance; Adaptive equipment   Physical Assistance Level 25% or less   Comment RW   Walk 150 Feet CARE Score 3   Ambulation   Primary Mode of Locomotion Prior to Admission Walk   Distance Walked (feet) 163 ft   Assist Device Roller Walker   Does the patient walk? 2   Yes   Curb or Single Stair   Style negotiated Single stair   Type of Assistance Needed Physical assistance   Physical Assistance Level 51%-75%   Comment FF on floor 4 BHR   1 Step (Curb) CARE Score 2   4 Steps   Type of Assistance Needed Physical assistance   Physical Assistance Level 51%-75%   Comment FF on floor 4 BHR   4 Steps CARE Score 2 12 Steps   Type of Assistance Needed Physical assistance   Physical Assistance Level 51%-75%   Comment FF on floor 4 BHR   12 Steps CARE Score 2   Stairs   Type Stairs   # of Steps 12   Weight Bearing Precautions Fall Risk   Assist Devices Bilateral Rail   Findings reciprocal gate pattern ascending & descending   Therapeutic Interventions   Flexibility manual HS & gastroc stretch [id number]   Assessment   Treatment Assessment Pt engaged in 60 min skilled PT session focused on inc endurance  Donned pt TEDs at beginning of session  Pt vitals were taken at begining of session (see vitals) HR remains tacky  Pt performed FF on floor 4 with BHR ModA ascending & descending using reciprocal gate pattern  Pt rested in chair after ascending for 2 min  HR during rest was 138 to 122 after rest  Pt trialed using RW for balance  Pt was able to amb 163' with c/o SOB  Pt was able to amb faster using RW to inc endurance  Performed soft tissue manipulation on (R) trap d/t c/o stiffness from sleeping  Pt continues to be limited by decreased strength & endurance  In next session continue to amb to inc endurance  Pt was returned to recliner with all needs in reach  Family/Caregiver Present no   Problem List Decreased strength;Decreased range of motion;Decreased endurance; Impaired balance;Decreased mobility;Pain   Barriers to Discharge Inaccessible home environment;Decreased caregiver support   PT Barriers   Functional Limitation Car transfers; Ramp negotiation;Stair negotiation;Standing;Transfers; Walking   Plan   Treatment/Interventions ADL retraining;Functional transfer training; Therapeutic exercise; Endurance training;Patient/family training; Compensatory technique education   Progress Progressing toward goals   Recommendation   PT Discharge Recommendation Home with home health rehabilitation   PT Therapy Minutes   PT Time In 0830   PT Time Out 0930   PT Total Time (minutes) 60   PT Mode of treatment - Individual (minutes) 60   PT Mode of treatment - Concurrent (minutes) 0   PT Mode of treatment - Group (minutes) 0   PT Mode of treatment - Co-treat (minutes) 0   PT Mode of Treatment - Total time(minutes) 60 minutes   PT Cumulative Minutes 490   Therapy Time missed   Time missed?  No

## 2023-03-02 NOTE — PLAN OF CARE
Problem: PAIN - ADULT  Goal: Verbalizes/displays adequate comfort level or baseline comfort level  Description: Interventions:  - Encourage patient to monitor pain and request assistance  - Assess pain using appropriate pain scale  - Administer analgesics based on type and severity of pain and evaluate response  - Implement non-pharmacological measures as appropriate and evaluate response  - Consider cultural and social influences on pain and pain management  - Notify physician/advanced practitioner if interventions unsuccessful or patient reports new pain  Outcome: Progressing     Problem: INFECTION - ADULT  Goal: Absence or prevention of progression during hospitalization  Description: INTERVENTIONS:  - Assess and monitor for signs and symptoms of infection  - Monitor lab/diagnostic results  - Monitor all insertion sites, i e  indwelling lines, tubes, and drains  - Monitor endotracheal if appropriate and nasal secretions for changes in amount and color  - [place] appropriate cooling/warming therapies per order  - Administer medications as ordered  - Instruct and encourage patient and family to use good hand hygiene technique  - Identify and instruct in appropriate isolation precautions for identified infection/condition  Outcome: Progressing  Goal: Absence of fever/infection during neutropenic period  Description: INTERVENTIONS:  - Monitor WBC    Outcome: Progressing     Problem: SAFETY ADULT  Goal: Patient will remain free of falls  Description: INTERVENTIONS:  - Educate patient/family on patient safety including physical limitations  - Instruct patient to call for assistance with activity   - Consult OT/PT to assist with strengthening/mobility   - Keep Call bell within reach  - Keep bed low and locked with side rails adjusted as appropriate  - Keep care items and personal belongings within reach  - Initiate and maintain comfort rounds  - Make Fall Risk Sign visible to staff  - Offer Toileting every 2 Hours, in advance of need  - Initiate/Maintain alarm  - Obtain necessary fall risk management equipment:   - Apply yellow socks and bracelet for high fall risk patients  - Consider moving patient to room near nurses station  Outcome: Progressing  Goal: Maintain or return to baseline ADL function  Description: INTERVENTIONS:  -  Assess patient's ability to carry out ADLs; assess patient's baseline for ADL function and identify physical deficits which impact ability to perform ADLs (bathing, care of mouth/teeth, toileting, grooming, dressing, etc )  - Assess/evaluate cause of self-care deficits   - Assess range of motion  - Assess patient's mobility; develop plan if impaired  - Assess patient's need for assistive devices and provide as appropriate  - Encourage maximum independence but intervene and supervise when necessary  - Involve family in performance of ADLs  - Assess for home care needs following discharge   - Consider OT consult to assist with ADL evaluation and planning for discharge  - Provide patient education as appropriate  Outcome: Progressing  Goal: Maintains/Returns to pre admission functional level  Description: INTERVENTIONS:  - Perform BMAT or MOVE assessment daily    - Set and communicate daily mobility goal to care team and patient/family/caregiver  - Collaborate with rehabilitation services on mobility goals if consulted  - Perform Range of Motion 3 times a day  - Reposition patient every 2 hours    - Dangle patient 3 times a day  - Stand patient 3 times a day  - Ambulate patient 3 times a day  - Out of bed to chair 3 times a day   - Out of bed for meals 3 times a day  - Out of bed for toileting  - Record patient progress and toleration of activity level   Outcome: Progressing     Problem: DISCHARGE PLANNING  Goal: Discharge to home or other facility with appropriate resources  Description: INTERVENTIONS:  - Identify barriers to discharge w/patient and caregiver  - Arrange for needed discharge resources and transportation as appropriate  - Identify discharge learning needs (meds, wound care, etc )  - Arrange for interpretive services to assist at discharge as needed  - Refer to Case Management Department for coordinating discharge planning if the patient needs post-hospital services based on physician/advanced practitioner order or complex needs related to functional status, cognitive ability, or social support system  Outcome: Progressing     Problem: Prexisting or High Potential for Compromised Skin Integrity  Goal: Skin integrity is maintained or improved  Description: INTERVENTIONS:  - Identify patients at risk for skin breakdown  - Assess and monitor skin integrity  - Assess and monitor nutrition and hydration status  - Monitor labs   - Assess for incontinence   - Turn and reposition patient  - Assist with mobility/ambulation  - Relieve pressure over bony prominences  - Avoid friction and shearing  - Provide appropriate hygiene as needed including keeping skin clean and dry  - Evaluate need for skin moisturizer/barrier cream  - Collaborate with interdisciplinary team   - Patient/family teaching  - Consider wound care consult   Outcome: Progressing     Problem: Nutrition/Hydration-ADULT  Goal: Nutrient/Hydration intake appropriate for improving, restoring or maintaining nutritional needs  Description: Monitor and assess patient's nutrition/hydration status for malnutrition  Collaborate with interdisciplinary team and initiate plan and interventions as ordered  Monitor patient's weight and dietary intake as ordered or per policy  Utilize nutrition screening tool and intervene as necessary  Determine patient's food preferences and provide high-protein, high-caloric foods as appropriate       INTERVENTIONS:  - Monitor oral intake, urinary output, labs, and treatment plans  - Assess nutrition and hydration status and recommend course of action  - Evaluate amount of meals eaten  - Assist patient with eating if necessary   - Allow adequate time for meals  - Recommend/ encourage appropriate diets, oral nutritional supplements, and vitamin/mineral supplements  - Order, calculate, and assess calorie counts as needed  - Recommend, monitor, and adjust tube feedings and TPN/PPN based on assessed needs  - Assess need for intravenous fluids  - Provide specific nutrition/hydration education as appropriate  - Include patient/family/caregiver in decisions related to nutrition  Outcome: Progressing     Problem: MOBILITY - ADULT  Goal: Maintain or return to baseline ADL function  Description: INTERVENTIONS:  -  Assess patient's ability to carry out ADLs; assess patient's baseline for ADL function and identify physical deficits which impact ability to perform ADLs (bathing, care of mouth/teeth, toileting, grooming, dressing, etc )  - Assess/evaluate cause of self-care deficits   - Assess range of motion  - Assess patient's mobility; develop plan if impaired  - Assess patient's need for assistive devices and provide as appropriate  - Encourage maximum independence but intervene and supervise when necessary  - Involve family in performance of ADLs  - Assess for home care needs following discharge   - Consider OT consult to assist with ADL evaluation and planning for discharge  - Provide patient education as appropriate  Outcome: Progressing  Goal: Maintains/Returns to pre admission functional level  Description: INTERVENTIONS:  - Perform BMAT or MOVE assessment daily    - Set and communicate daily mobility goal to care team and patient/family/caregiver  - Collaborate with rehabilitation services on mobility goals if consulted  - Perform Range of Motion 3 times a day  - Reposition patient every 2 hours    - Dangle patient 3 times a day  - Stand patient 3 times a day  - Ambulate patient 3 times a day  - Out of bed to chair 3 times a day   - Out of bed for meals 3 times a day  - Out of bed for toileting  - Record patient progress and toleration of activity level   Outcome: Progressing

## 2023-03-02 NOTE — PROGRESS NOTES
PM&R PROGRESS NOTE:  Norma Tabor 61 y o  male MRN: [id number]  Unit/Bed#: -03 Encounter: [id number]        Rehabilitation Diagnosis: Impairment of mobility, safety and Activities of Daily Living (ADLs) due to Debility:  16  Debility (Non-cardiac/Non-pulmonary)    HPI: Junior Austin is a 61 y o  male with a history of hyperlipidemia, hypertension, GERD, ventral hernia, colon adenocarcinoma that initially presented to [facility] for an elective surgical procedure with Dr Ledy Ferguson on 11/7/22  Patient presented to hem/onc on  9/22 when CT abdomen revealed transverse colonic apple core lesion and innumerable hepatic metastases  MRI revealed multiple hepati metastasis with smaller lesions in left lobe and larger lesions on the right lobe  On 11/7, patient underwent exploratory laparotomy, segment 3 liver resection, ablation, intraoperative ultrasound of the liver  This was complicated by left upper quadrant hematoma, imaging showed suspected leak from transverse colon anastomosis  On 11/16, patient underwent exploratory lap revealing hematoma, defect in transverse colon anastomosis with extravasation of succus and a dilated cecum requiring resection  He had a right hemicolectomy, left in discontinuity and temporary abdominal closure device  On 11/17, patient underwent re-exploratory, partial descending colectomy, primary colonic anastomosis created with diverting loop ileostomy and midline wound vac placement  An intra-abdominal abscess was positive for ESBL and patient completed prolonged course of antibiotics  He was discharged to SNF on 12/3 and returned to hospital on 12/4 for increased abdominal pain  Patient monitored off of antibiotics  On 12/6 patient was noted with an increased creatinine level, received IVF bolus with improvement  Patient developed tachycardia, increased abdominal pain, and incision with yellow drainage noted  CT AP showed abdominal abscess and distended gallbladder   ID was consulted, patient was continued on IV antibiotics through 12/15  Patient went to IR for percutaneous cholecystostomy tube insertion and hepatectomy abscess drainage  Nephrology consulted and initiated sodium bicarb gtt and IV albumin  On 12/14, patient midline/left chest wall pain, below abscess drain  Cardiac work-up completed  Troponin level 57 and CXR showed progressive bilateral opacities suspicious for CHF and small left pleural effusion  Cariology consulted with chest pain appearing musculoskeletal no further work up warranted  Patient was deemed medically stable and admitted to [address] [place] [address] on 12/14/22  He was discharged from [facility] on 01/17/23  Patient present to [facility] on 02/07 with increased weakness for two weeks with 2 falls noted  No injury noted  He was found to be septic with the main source of midline abdominal wound which was with foul smelling drainage  CT showed improvement in fluid collection at the left liver segment, perisplenic, left retroperitoneum, interior to the spleen  Patient was treated with broad spectrum antibiotics and eventually placed on Ertapenem  Hospital course complicated by fevers and leukocytosis  Repeat imaging concerning for cholecystitis  Patient underwent cystostomy tube exchange and Pattock drain was removed on 02/24  The patient was evaluated by the Rehabilitation team and deemed an appropriate candidate for an inpatient acute rehabilitation program  Patient was admitted on 02/24/23      Lennox Gash: Patient seen face to face  No acute issues overnight  Patient reports symptomatic orthostasis this am with therapy  Jesús stockings not on at time  He reports a quick recovery, discussed Atarax side effects  Will continue with current dose and evaluate tomorrow as patient slept well on 10 mg  He was tearful with therapy this am, neuropsychology consult placed  Pain is well managed, abdominal tenderness at per chol and previous drain site are tolerable   Ileostomy functioning, voiding  Midline abdominal wound healing, pink granulation tissue in wound bed with scattered areas of slough  Denies chest pain, shortness of breath, fever, chills, nausea  24 hr total  Cholecystostomy tube-  30 cc  Ileostomy- 650  cc     ASSESSMENT: Stable, progressing    PLAN:  - orthostatic hypotension: Jesús stockings during day and off at night; orthostatic BP with therapy, continue to monitor symptoms  IM rec appreciated  - CBC, BMP 03/03  - wound care- continue daily dressing changes  - indigestion resolved  - continue current rehabilitation program    Rehabilitation    • Functional deficits:  Mobility, self care  • Continue current rehabilitation plan of care to maximize function  • Functional update:   • PT: mobility- mod A, transfers, min - mod A, ambulation, mod A, 91'  • OT: ADL- bathing- min A, dressing- incidental touching, toileting- max A  • Estimated Discharge: anticipated 3/10 home with homecare    Pain  • Tylenol, oxycodone    DVT prophylaxis  • Lovenox    Bladder plan  • Continent    Bowel plan  • Ileostomy    Malignant neoplasm of transverse colon [facility])  Assessment & Plan  Complex history with chronological details below:    2/22: S/P diverting colostomy, liver biopsy +Chemo tx  11/7/22: Hepatic resection and colostomy reversal  11/16/22: Exploratory laparotomy with bowel resection and VAC placement   11/17/22: Right hemicolectomy, partial descending colon resection, colocolonic anastomosis with loop ileosotmy and midline VAC placement  12/20/22: Drains placed, total of 3 drains + perc radha tube + ileostomy  1/31/23: 2 of 3 drains removed  This hospital stay had fever/leukocytosis/sepsis, started Meropenem and then to Vancomycin and Ertapenem  Wound cx again = Ecoli ESBL  LD Ertapenem 2/14, Vancomycin 2/16 2/24: Abscessogram of midline drain showed minimal persistent collection and the midline catheter was removed   Perc Radha tube exchanged  • Patient does not wish to follow with [place] [facility]ology anymore as it is too far of a drive; would like to establish with [facility] oncology (already follows with Dr Jorge L Briones)  • Follows with Dr Valentina Oviedo as outpt    * Sepsis [facility])  Assessment & Plan  Noted the development of foul-smelling drainage and increasing pain from his chronic abdominal wound with leukocytosis, tachypnea, tachycardia  • Purulent drainage from abdominal wound  • Hx of colon CA w/ extensive abd surgical- adenocarcinoma of transverse colon section  • Patient has several abdominal drains in place, with CT showing improvement in the fluid collections at the left liver lobe, perisplenic area, and the left retroperitoneum inferior to the spleen  • Completed Vancomycin and Ertapenem  • S/p bedside midline wound debridement + wet dressing 2/8 with general surgery  • S/p IR tube check 2/17/2023, drains kept in place  Discussed with IR, follow up in 1 month  • Given ongoing low grade fever and worsening leukocytosis, ordered repeat blood cultures and repeat CT a/p    CT a/p showing moderate R pleural effusion, imaging concerning for cholecystitis  Discussed case with surgery, IR  IR will reposition drain today- Alvina tube check showed retraction of the alvina tube   Interval occlusion of the cystic duct   Dino-enteric fistula persisted  Exchange of alvina tube 2/24  • Continue to monitor off of antibiotics, low threshold to re-consult ID, but stable at this time  • Debility from Sepsis in addition to 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  • Pain control, anxiety management  • PT/OT    Chronic bilateral pleural effusions  Assessment & Plan  Hx of pleural effusions with   Interval worsening of moderate right and small left pleural effusions  There is associated bibasilar atelectasis based on CTAP on 2/22    Monitor oxygen and breathing, may benefit from thoracentesis    Depression  Assessment & Plan  Continue Cymbalta  Provide supportive counseling and encouragement, easily tearful  Neuropsychology consultation for support    Cholecystitis, unspecified  Assessment & Plan  Subacute at this point, but still requiring per radha tube  • s/p percutaneous tube placement 12/8/22 asnd recent exchange on 2/24/23  • CT scan was concerning for cholecystitis --> to IR 2/24 = showed retraction of the radha tube and interval occlusion of the cystic duct   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  Radha tube was exchanged  • Monitor output and pain    Abdominal wound dehiscence  Assessment & Plan  Midline abdominal wound with initially foul smelling drainage  Now improved per patient with red granular base and slough noted  Santyl and wet to dry dressing was done in acute care  Consult wound care   Daily dressing changes    Nephrolithiasis  Assessment & Plan  RUQ US revealed 7 mm nonobstructing right intrarenal calculus  No hydronephrosis noted on CT a/p  Asymptomatic at this time  • Flomax  • Monitor outpt, symptoms    Hyponatremia  Assessment & Plan  • Mild, most recently 131 on 3/1  • [place] likely2/2 SIADH in setting of malignancy  • Sodium chloride tab 1 BID  • Monitor BMP    Elevated alkaline phosphatase level  Assessment & Plan  Chronically elevated likely in setting of known hepatic metastasis  • RUQ US Decompressed gallbladder around a cholecystostomy tube, limiting evaluation  Hepatomegaly  Heterogeneous echotexture of the liver in this patient with known metastatic disease  7 mm nonobstructing right intrarenal calculus  • Monitor with am CMP    Leukocytosis  Assessment & Plan  WBC 13 07 (previous 13 41)  Mildly above normal, trendiong down, however has a history of spiking and dropping  Monitor labs however need to follow clinical exam and vitals as it may spike and drop again  Monitor off antibiotics  ID following  If Temp >101 blood cultures and consider CT A/P    Abscess  Assessment & Plan  Smaller abscesses on recent studies now with no active drains   At risk for further complications  Lower theshold for abdominal CT if develops fevers or leukocytosis    Acute on chronic kidney failure [facility])  Assessment & Plan  Lab Results   Component Value Date    EGFR 56 02/24/2023    EGFR 52 02/23/2023    EGFR 52 02/22/2023    CREATININE 1 36 (H) 02/24/2023    CREATININE 1 45 (H) 02/23/2023    CREATININE 1 45 (H) 02/22/2023     • POA with creatinine 2 52; baseline 1-1 3  • Suspecting possible multifactorial cause in setting of dehydration and sepsis  • Improving back into baseline levels, Continue to monitor on labs, minimize any relative hypotension  Avoid nephrotoxic agents  Anemia  Assessment & Plan  Hbg 8 3 (previous 8 7, 7 2, 8 1)  Required transfusion on 2/16  Order type and screen, 1 unit pRBC (2/26)  Monitor CBC    Mixed hyperlipidemia  Assessment & Plan  Continue statin    Benign essential hypertension  Assessment & Plan  Monitor pressures in therapy    Type 2 diabetes mellitus without complication, with long-term current use of insulin [facility])  Assessment & Plan  Lab Results   Component Value Date    HGBA1C 8 0 (H) 09/26/2022       Recent Labs     02/24/23  0839 02/24/23  1137 02/24/23  1626 02/24/23  2040   POCGLU 106 105 152* 120     • Home:  Lantus 8U qam/Lispro 4U TID  • Here:  Lantus 5U qhs/Lispro 4U TID  • Has a DEXCOM meter and a regular meter at home  • Continue DM diet and QID Accuchecks/SSI    Appreciate IM consultants medical co-management  Labs, medications, and imaging reviewed  ROS:  Review of Systems   A 10 point review of systems was negative except for what is noted in the HPI  OBJECTIVE:   /72 (BP Location: Right arm)   Pulse (!) 108   Temp 97 9 °F (36 6 °C) (Oral)   Resp 18   Ht 5' 9" (1 753 m)   Wt 80 1 kg (176 lb 9 4 oz)   SpO2 96%   BMI 26 08 kg/m²     Physical Exam  Constitutional:       Appearance: Normal appearance  HENT:      Head: Normocephalic and atraumatic        Mouth/Throat:      Mouth: Mucous membranes are moist  Cardiovascular:      Rate and Rhythm: Normal rate and regular rhythm  Pulses: Normal pulses  Heart sounds: Normal heart sounds  Pulmonary:      Effort: Pulmonary effort is normal       Breath sounds: Normal breath sounds  Abdominal:      General: Bowel sounds are normal       Palpations: Abdomen is soft  Tenderness: There is abdominal tenderness  Musculoskeletal:         General: Normal range of motion  Cervical back: Normal range of motion  Right lower leg: Edema present  Left lower leg: Edema present  Skin:     General: Skin is warm and dry  Capillary Refill: Capillary refill takes less than 2 seconds  Comments: Midabdominal wound- pink with granulation tissue and scattered areas of slough-healing   Neurological:      Mental Status: He is alert and oriented to person, place, and time  Motor: Weakness present     Psychiatric:         Mood and Affect: Mood normal          Judgment: Judgment normal         Lab Results   Component Value Date    WBC 13 07 (H) 02/28/2023    HGB 8 3 (L) 02/28/2023    HCT 26 6 (L) 02/28/2023    MCV 84 02/28/2023     02/28/2023     Lab Results   Component Value Date    SODIUM 131 (L) 03/01/2023    K 3 9 03/01/2023     03/01/2023    CO2 20 (L) 03/01/2023    BUN 14 03/01/2023    CREATININE 1 11 03/01/2023    GLUC 95 03/01/2023    CALCIUM 8 3 03/01/2023     Lab Results   Component Value Date    INR 1 32 (H) 02/07/2023    INR 1 12 12/19/2022    INR 1 10 11/12/2022    PROTIME 16 1 (H) 02/07/2023    PROTIME 14 7 (H) 12/19/2022    PROTIME 14 4 11/12/2022       Current Facility-Administered Medications:   •  acetaminophen (TYLENOL) tablet 650 mg, 650 mg, Oral, Q4H PRN, Clevester Side, DO, 650 mg at 03/01/23 2158  •  aspirin chewable tablet 81 mg, 81 mg, Oral, Daily, Clevester Side, DO, 81 mg at 03/02/23 0846  •  atorvastatin (LIPITOR) tablet 40 mg, 40 mg, Oral, Daily, Clevester Side, DO, 40 mg at 03/02/23 0846  •  calcium carbonate (TUMS) chewable tablet 500 mg, 500 mg, Oral, TID PRN, GAURANG Keith, 500 mg at 03/01/23 1715  •  collagenase (SANTYL) ointment, , Topical, Daily, Dave Flow, DO, Given at 03/01/23 1501  •  DULoxetine (CYMBALTA) delayed release capsule 30 mg, 30 mg, Oral, Daily, Dave Flow, DO, 30 mg at 03/02/23 0846  •  enoxaparin (LOVENOX) subcutaneous injection 40 mg, 40 mg, Subcutaneous, Q24H SHANNON, GAURANG Keith, 40 mg at 03/02/23 3947  •  hydrOXYzine HCL (ATARAX) tablet 10 mg, 10 mg, Oral, HS, Dave Flow, DO, 10 mg at 03/01/23 2158  •  insulin glargine (LANTUS) subcutaneous injection 5 Units 0 05 mL, 5 Units, Subcutaneous, HS, Dave Flow, DO, 5 Units at 03/01/23 2157  •  insulin lispro (HumaLOG) 100 units/mL subcutaneous injection 1-6 Units, 1-6 Units, Subcutaneous, TID AC, 2 Units at 03/02/23 1336 **AND** Fingerstick Glucose (POCT), , , 4x Daily AC and at bedtime, Dave Flow, DO  •  insulin lispro (HumaLOG) 100 units/mL subcutaneous injection 1-6 Units, 1-6 Units, Subcutaneous, HS, Dave Flow, DO, 1 Units at 02/28/23 2238  •  insulin lispro (HumaLOG) 100 units/mL subcutaneous injection 4 Units, 4 Units, Subcutaneous, TID With Meals, Dave Flow, DO, 4 Units at 03/02/23 1337  •  melatonin tablet 6 mg, 6 mg, Oral, HS PRN, Dave Flow, DO, 6 mg at 02/28/23 2238  •  methocarbamol (ROBAXIN) tablet 500 mg, 500 mg, Oral, BID PRN, Dave Flow, DO, 500 mg at 02/24/23 2120  •  midodrine (PROAMATINE) tablet 2 5 mg, 2 5 mg, Oral, BID AC, GAURANG Bradshaw, 2 5 mg at 03/02/23 1135  •  ondansetron (ZOFRAN) injection 4 mg, 4 mg, Intravenous, Q4H PRN, Dave Flow, DO  •  oxyCODONE (ROXICODONE) IR tablet 2 5 mg, 2 5 mg, Oral, Q4H PRN, Dave Flow, DO  •  oxyCODONE (ROXICODONE) IR tablet 5 mg, 5 mg, Oral, Q4H PRN, Dave Flow, DO, 5 mg at 03/02/23 5940  •  pantoprazole (PROTONIX) EC tablet 40 mg, 40 mg, Oral, BID AC, Dave Flow, DO, 40 mg at 03/02/23 8505  •  simethicone (MYLICON) chewable tablet 80 mg, 80 mg, Oral, 4x Daily (with meals and at bedtime), Mari Nazario DO, 80 mg at 03/02/23 1336  •  sodium chloride tablet 1 g, 1 g, Oral, BID With Meals, GAURANG Marcos, 1 g at 03/02/23 0846  •  tamsulosin (FLOMAX) capsule 0 4 mg, 0 4 mg, Oral, Daily With Rajan Freed DO, 0 4 mg at 03/01/23 1715    Past Medical History:   Diagnosis Date   • Abdominal pain 03/12/2022   • Acute renal failure ([place] [address] )     [id number] resolved   • Acute respiratory failure with hypoxia ([place] [address] ) 11/12/2022   • Bacteremia 11/12/2022   • Cancer ([place] [address] )    • Diabetes mellitus ([place] [address] )    • Enteritis 08/23/2016   • Flash pulmonary edema ([place] [address] ) 11/12/2022   • Gastroparesis due to DM ([place] [address] ) 08/23/2016   • GERD (gastroesophageal reflux disease)    • Hernia, ventral 08/04/2016   • Hyperlipidemia    • Hypertension    • Morbid obesity ([place] [address] ) 04/17/2018   • Postoperative visit 03/02/2022   • SIRS (systemic inflammatory response syndrome) ([place] [address] ) 03/12/2022   • Snoring    • Stage 3a chronic kidney disease ([place] [address] ) 02/19/2022       Patient Active Problem List    Diagnosis Date Noted   • Malignant neoplasm of transverse colon ([place] [address] ) 03/01/2022   • Sepsis ([place] [address] ) 12/17/2022   • 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 liver ([place] [address] ) 03/02/2022   • Abdominal wound dehiscence 02/24/2023   • Cholecystitis, unspecified 02/24/2023   • Depression 02/24/2023   • Chronic bilateral pleural effusions 02/24/2023   • Nephrolithiasis 02/09/2023   • Abnormal CT scan 02/07/2023   • Hyponatremia 02/07/2023   • Dehiscence of incision 12/30/2022   • Elevated alkaline phosphatase level 12/30/2022   • Leukocytosis 12/29/2022   • Abscess 12/27/2022   • Acute pain 12/27/2022   • Severe protein-calorie malnutrition ([facility] ) 12/14/2022   • [facility] ) 12/14/2022   • MR (mitral regurgitation) 11/12/2022   • ESBL (extended spectrum beta-lactamase) producing bacteria infection 11/12/2022   • Encephalopathy 11/09/2022   • Cervical radiculopathy 10/26/2022   • Other fatigue 06/15/2022   • [facility] ) 05/27/2022   • Colon cancer metastasized to liver ([facility] ) 03/12/2022   • Iron deficiency anemia, unspecified 03/01/2022   • Thrombocytosis 02/21/2022   • Hypokalemia 02/19/2022   • Transaminitis 02/19/2022   • Type 2 diabetes mellitus with hyperlipidemia ([facility] ) 02/18/2022   • Anemia 02/18/2022   • Left ureteral calculus 01/30/2020   • Incarcerated umbilical hernia 63/10/1943   • Testicular hypogonadism 06/19/2017   • Low testosterone 05/30/2017   • Type 2 diabetes mellitus without complication, with long-term current use of insulin ([place] [address] ) 08/23/2016   • Benign essential hypertension 08/23/2016   • Mixed hyperlipidemia 08/23/2016   • Erectile dysfunction 07/11/2016   • Obesity (BMI 30-39 9) 07/11/2016          GAURANG Penaloza  Physical Medicine and Chris

## 2023-03-02 NOTE — PROGRESS NOTES
03/02/23 1000   Pain Assessment   Pain Assessment Tool 0-10   Pain Score No Pain   Restrictions/Precautions   Precautions Fall Risk;Contact/isolation;Supervision on toilet/commode;Multiple lines   Weight Bearing Restrictions No   ROM Restrictions No   Braces or Orthoses   (b/l teds)   Sit to Stand   Type of Assistance Needed Supervision   Physical Assistance Level No physical assistance   Comment CS warranted due to orthostatic hypotension and symptomatic  Sit to Stand CARE Score 4   Bed-Chair Transfer   Type of Assistance Needed Incidental touching   Physical Assistance Level No physical assistance   Comment CS w RW, CGA with no AD   Chair/Bed-to-Chair Transfer CARE Score 4   Functional Standing Tolerance   Time 4-5 minutes   Activity Pt engaged in modified KANG CHI program both while in unsupported sit at [facility] and in stance  Pt engaged in alternating forward stepping with fwd thrust punches and shoulder flexion/ext rythmic movements with focus on coordinating breath work with movements  Pt reports having practiced KANG CHI for over 40 years and states it's a very meaningful activity for him  Education provided on task modifications based on pt's tolerance that day  Pt receptive to same, educated on guided videos accessible on youtube to follow  Pt reports feeling "immediately relaxed" completing KANG CHI exercises  Completed to promote deep breathing techniques, relaxation, intrinsic gratification, standing bal/tolerance, and endurance to promote inc independence and safety with I/ADL tasks  Exercise Tools   Other Exercise Tool 1 Pt completes scifit UE ergometer on level 1 for 5 minutes prograde and 5 minutes retrograde to increase overall endurance and UB strength for increased independence with ADL tasks and functional transfers  Pt overall tolerates well with 1 rest break to manage fatigue  Cognition   Overall Cognitive Status WFL   Arousal/Participation Alert; Cooperative   Attention Within functional limits   Orientation Level Oriented X4   Memory Within functional limits   Following Commands Follows all commands and directions without difficulty   Activity Tolerance   Activity Tolerance Patient tolerated treatment well   Assessment   Treatment Assessment Pt participated in skilled OT services with focus on functional txfers, standing tolerance/balance, and strengthening  Pt continues to make G progress towards achieving OT goals  Pt remains limited by fatigue, dec act julia, dec endurance, dec strength, and orthostatic hypotension  Pt remains insightful into deficits  Pt will continue to benefit from skilled OT services with focus on above mentioned deficits to inc safety and independence with I/ADL tasks and reduce burden of care  Prognosis Good   Problem List Decreased strength;Decreased range of motion;Decreased endurance; Impaired balance;Decreased mobility;Pain   Plan   Treatment/Interventions ADL retraining;Functional transfer training; Therapeutic exercise; Endurance training;Cognitive reorientation;Patient/family training;Equipment eval/education; Bed mobility; Compensatory technique education   Progress Progressing toward goals   OT Therapy Minutes   OT Time In 1000   OT Time Out 1130   OT Total Time (minutes) 90   OT Mode of treatment - Individual (minutes) 90   OT Mode of treatment - Concurrent (minutes) 0   OT Mode of treatment - Group (minutes) 0   OT Mode of treatment - Co-treat (minutes) 0   OT Mode of Treatment - Total time(minutes) 90 minutes   OT Cumulative Minutes 420   Therapy Time missed   Time missed?  No

## 2023-03-02 NOTE — PROGRESS NOTES
NEUROPSYCHOLOGY INITIAL CONSULT  NOTE  Denisse Benvaides 61 y o  :1965 male MRN: [id number]  DOS: 23  Unit/Bed#: -01 Encounter: [id number]      Requested by (Physician/Service): Carlota Castro DO  Reason for Consultation: Evaluate and treat impact of mood and coping on progress in rehabilitation  HPI: Denisse Benavides is a 61 y o  male with a medical history of hypertension, hyperlipidemia, ventral hernia, GERD that presented to [facility] on  for an elective surgical procedure due to metastatic colon adenocarcinoma by Dr Laurie Reese  Patient presented to hem/onc on  when CT of abdomen revealed transverse colonic apple core lesion and innumerable hepatic metastases  MRI revealed multiple hepatic metastasis with smaller lesions in left lobe with larger lesions on the right lobe  On , patient underwent exploratory laparotomy, segment 3 liver resection, ablation, intraoperative ultrasound of liver  This was complicated by a left upper quadrant hematoma, imaging showed suspected leak from transverse colon anastomosis  On  went to OR revealed left upper quadrant infected hematoma, defect in the transverse colon anastomosis with extravasation of succus  Massively dilated cecum requiring resection  He had a right hemicolectomy, left in discontinuity and temporary abdominal closure device was placed  On , he underwent re-exploration, partial descending colectomy, primary colonic anastomosis created with diverting loop ileostomy and midline wound VAC placement  He completed a prolonged course of IV antibiotics for ESBL bacteremia due to intra-abdominal abscess  On 12/3, patient was discharged to SNF for rehab  Patient was transferred back to hospital on  due to abdominal pain  He is unable to return to SNF due to concerns for pain management and management of acute medical needs   Per Surgery, patient is to continue with wet-to-dry dressings to midline abdominal incision, and packing to old stoma site  Patient has been observed off of antibiotics, with close monitoring of leukocytosis  On 12/6, patient was with noted increased creatinine level, and was administered 1L IV fluid bolus, with noted improvement  He then developed tachycardia, as well as increased abdominal pain, requiring additional IV fluid bolus  Abdominal incision was with noted yellow drainage  CT chest/abdomen/pelvis showed abdominal abscess and distended gallbladder  ID was consulted, and patient was continued on IV antibiotics (plan for 7 day course through 12/15)  Patient was taken to IR and underwent percutaneous cholecystostomy tube insertion and hepatectomy abscess drainage  Nephrology was consulted re: ALEXY, and initiated patient on Sodium Bicarb gtt as well as IV Albumin to assist with intravascular volume shifts, which as since been discontinued  On 12/14, patient with complaints of midline/left chestwall pain, right below IR abscess drain  Lasted for a few seconds and then resolved - suspected related to drain placement, however cardiac workup completed  Trop level was 57 and CXR showed progressive bilateral opacities suspicious for CHF and small left pleural effusion  Cardiology was consulted, with chest pain appearing musculoskeletal, and no further cardiac workup is warranted  Additionally, no diuresis or further intervention suggested for CXR findings  The patient was evaluated by the Rehabilitation team and deemed an appropriate candidate for comprehensive inpatient rehabilitation and admitted to the [facility] on 12/14/2022  He was discharged home on 1/17/23      He presented to the [address] [place] [address] on 2/7/23 with increasing weakness for the prior 2 weeks in the setting of 2 falls  He was found to be Spetic on admission with the m ain potential source being his midline abdominal wound with fould-smelling drainage   CT showed improvement in the fluid collection at the left liver segment, perisplenic, left retroperitoneum, inferior to the spleen  Patient treated with broad spectrum antibiotics eventually placed on Ertapenem  Course conplicated by spiking intermittent fevers as well as elevated WBC count   Repeat imaging concerning for cholecystitis  This was discussed with both surgery and IR, IR plan for cystostomy tube change   Pattock drain was removed on 2/24, cystostomy drain exchanged on 2/24  The patient was evaluated by the Rehabilitation team and deemed an appropriate candidate for comprehensive inpatient rehabilitation and admitted to the [facility] on 2/24/2023  2:58 PM   Functional deficits: impaired mobility, self care  Rehabilitation goals are to achieve a Modified Independent level with mobility and self care  Prognosis is good  ELOS is 10-14 days  Estimated discharge is home       On arrival he endorsed some discomfort around the perc chol site as it was just exchanged, otherwise he endorses improved appetite and limited nausea since his prior admission, less leaking of the ostomy and overall improved function, but still not at his baseline   Hgb remains low, type and screen and CBC ordered for the morning      HISTORY:     Patient Active Problem List    Diagnosis Date Noted   • Abdominal wound dehiscence 02/24/2023   • Cholecystitis, unspecified 02/24/2023   • Depression 02/24/2023   • Chronic bilateral pleural effusions 02/24/2023   • Nephrolithiasis 02/09/2023   • Abnormal CT scan 02/07/2023   • Hyponatremia 02/07/2023   • Dehiscence of incision 12/30/2022   • Elevated alkaline phosphatase level 12/30/2022   • Leukocytosis 12/29/2022   • Abscess 12/27/2022   • Acute pain 12/27/2022   • Sepsis ([address] ) 12/17/2022   • Severe protein-calorie malnutrition ([address] ) 12/14/2022   • Acute on chronic kidney failure ([address] ) 12/14/2022   • MR (mitral regurgitation) 11/12/2022   • ESBL (extended spectrum beta-lactamase) producing bacteria infection 11/12/2022   • Encephalopathy 11/09/2022   • Cervical radiculopathy 10/26/2022   • Other fatigue 06/15/2022   • Colostomy prolapse ([facility] ) 05/27/2022   • Colon cancer metastasized to liver ([facility] ) 03/12/2022   • 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 liver ([facility] ) 03/02/2022   • Iron deficiency anemia, unspecified 03/01/2022   • Malignant neoplasm of transverse colon ([facility] ) 03/01/2022   • Thrombocytosis 02/21/2022   • Hypokalemia 02/19/2022   • Transaminitis 02/19/2022   • Type 2 diabetes mellitus with hyperlipidemia ([place] [address] ) 02/18/2022   • Anemia 02/18/2022   • Left ureteral calculus 01/30/2020   • Incarcerated umbilical hernia 49/49/2261   • Testicular hypogonadism 06/19/2017   • Low testosterone 05/30/2017   • Type 2 diabetes mellitus without complication, with long-term current use of insulin ([place] [address] ) 08/23/2016   • Benign essential hypertension 08/23/2016   • Mixed hyperlipidemia 08/23/2016   • Erectile dysfunction 07/11/2016   • Obesity (BMI 30-39 9) 07/11/2016       Body mass index is 26 08 kg/m²      Past Medical History:     Past Medical History:   Diagnosis Date   • Abdominal pain 03/12/2022   • Acute renal failure ([place] [address] )     [id number] resolved   • Acute respiratory failure with hypoxia ([place] [address] ) 11/12/2022   • Bacteremia 11/12/2022   • Cancer ([place] [address] )    • Diabetes mellitus ([facility] )    • Enteritis 08/23/2016   • Flash pulmonary edema ([facility] ) 11/12/2022   • Gastroparesis due to DM ([facility] ) 08/23/2016   • GERD (gastroesophageal reflux disease)    • Hernia, ventral 08/04/2016   • Hyperlipidemia    • Hypertension    • Morbid obesity ([facility] ) 04/17/2018   • Postoperative visit 03/02/2022   • SIRS (systemic inflammatory response syndrome) ([facility] ) 03/12/2022   • Snoring    • Stage 3a chronic kidney disease ([place] [address] ) 02/19/2022        Past Surgical History:     Past Surgical History:   Procedure Laterality Date   • COLONOSCOPY     • COLOSTOMY N/A 02/20/2022    Procedure: COLOSTOMY LOOP, diverting;  Surgeon: Krishna Damico MD;  Location: [facility];  Service: General • ESOPHAGOGASTRODUODENOSCOPY N/A 08/24/2016    Procedure: ESOPHAGOGASTRODUODENOSCOPY (EGD); Surgeon: Armando Dover MD;  Location: [facility];   Service:    • EXPLORATORY LAPAROTOMY W/ BOWEL RESECTION N/A 11/16/2022    Procedure: LAPAROTOMY EXPLORATORY W/ BOWEL RESECTION- VAC PLACEMENT;  Surgeon: Shanon Ramon MD;  Location: [facility];  Service: Surgical Oncology   • EXPLORATORY LAPAROTOMY W/ BOWEL RESECTION N/A 11/17/2022    Procedure: LAPAROTOMY EXPLORATORY W/ BOWEL RESECTION;  Surgeon: Shanon Ramon MD;  [facility];  Service: Surgical Oncology   • ILEOSTOMY N/A 11/17/2022    Procedure: ILEOSTOMY;  Surgeon: Shanon Ramon MD;  [facility];  Service: Surgical Oncology   • ILEOSTOMY CLOSURE N/A 11/7/2022    Procedure: REVERSAL COLOSTOMY;  Surgeon: Yadira Ewing MD;  [facility];  Service: Surgical Oncology   • IR CHOLECYSTOSTOMY TUBE CHECK AND/OR REMOVAL  2/24/2023   • IR CHOLECYSTOSTOMY TUBE CHECK/CHANGE/REPOSITION/REINSERTION/UPSIZE  12/12/2022   • IR CHOLECYSTOSTOMY TUBE PLACEMENT  12/8/2022   • IR DRAINAGE TUBE CHECK AND/OR REMOVAL  1/3/2023   • IR DRAINAGE TUBE CHECK/CHANGE/REPOSITION/REINSERTION/UPSIZE  1/12/2023   • IR DRAINAGE TUBE CHECK/CHANGE/REPOSITION/REINSERTION/UPSIZE  1/31/2023   • IR DRAINAGE TUBE CHECK/CHANGE/REPOSITION/REINSERTION/UPSIZE  2/10/2023   • IR DRAINAGE TUBE CHECK/CHANGE/REPOSITION/REINSERTION/UPSIZE  2/16/2023   • IR DRAINAGE TUBE CHECK/CHANGE/REPOSITION/REINSERTION/UPSIZE  2/24/2023   • IR DRAINAGE TUBE PLACEMENT  12/8/2022   • IR DRAINAGE TUBE PLACEMENT  12/20/2022   • IR PORT CHECK  2/24/2023   • IR PORT PLACEMENT  03/10/2022   • IR THORACENTESIS  1/12/2023   • KIDNEY STONE SURGERY     • LIVER BIOPSY LAPAROSCOPIC N/A 02/20/2022    Procedure: DIAGNOSTIC LAPAROSCOPIC LIVER BIOPSY, DIVERTING LOOP COLOSTOMY ;  Surgeon: Richa Norman MD;  Location: [facility];  Service: General   • LIVER LOBECTOMY N/A 11/7/2022    Procedure: SEGMENT 3 LIVER RESECTION, ABLATION, INTRAOPERATIVE U/S OF LIVER, PERITONEAL BIOPSY;  Surgeon: Momo Raya MD;  [facility];  Service: Surgical Oncology   • RIGHT COLON RESECTION N/A 11/7/2022    Procedure: EX LAP, TRANVERSE COLON RESECTION;  Surgeon: Momo Raya MD;  [facility];  Service: Surgical Oncology   • TONSILLECTOMY     • UMBILICAL HERNIA REPAIR LAPAROSCOPIC N/A 04/26/2019    Procedure: LAPAROSCOPIC UMBILICAL HERNIA REPAIR;  Surgeon: Susanna Roy MD;  [facility]: [facility];  Service: General   • VAC DRESSING APPLICATION N/A 13/14/3358    Procedure: APPLICATION VAC DRESSING ABDOMEN/TRUNK;  Surgeon: Lilly Solorzano MD;  [facility];  Service: Surgical Oncology         Allergies: Allergies   Allergen Reactions   • Shellfish-Derived Products - Food Allergy Anaphylaxis   • Erythromycin GI Intolerance         Social History:    Social History     Socioeconomic History   • Marital status: /Civil Union     Spouse name: Not on file   • Number of children: Not on file   • Years of education: Not on file   • Highest education level: Not on file   Occupational History   • Occupation: ACTOR     Employer: [employer]   Tobacco Use   • Smoking status: Never   • Smokeless tobacco: Never   Vaping Use   • Vaping Use: Never used   Substance and Sexual Activity   • Alcohol use: Never   • Drug use: No   • Sexual activity: Yes     Partners: Female   Other Topics Concern   • Not on file   Social History Narrative   • Not on file     Social Determinants of Health     Financial Resource Strain: Not on file   Food Insecurity: No Food Insecurity   • Worried About Running Out of Food in the Last Year: Never true   • Ran Out of Food in the Last Year: Never true   Transportation Needs: No Transportation Needs   • Lack of Transportation (Medical): No   • Lack of Transportation (Non-Medical):  No   Physical Activity: Insufficiently Active   • Days of Exercise per Week: 5 days   • Minutes of Exercise per Session: 10 min   Stress: No Stress Concern Present   • Feeling of Stress : Not at all   Social Connections: Not on file   Intimate Partner Violence: Not on file   Housing Stability: Low Risk    • Unable to Pay for Housing in the Last Year: No   • Number of Places Lived in the Last Year: 1   • Unstable Housing in the Last Year: No        Family History:    Family History   Problem Relation Age of Onset   • Diabetes Mother         mellitus   • Lung cancer Mother    • Coronary artery disease Father        Medications:     Current Facility-Administered Medications:   •  acetaminophen (TYLENOL) tablet 650 mg, 650 mg, Oral, Q4H PRN, Chrissie Medici, DO, 650 mg at 03/01/23 2158  •  aspirin chewable tablet 81 mg, 81 mg, Oral, Daily, Chrissie Medici, DO, 81 mg at 03/02/23 8521  •  atorvastatin (LIPITOR) tablet 40 mg, 40 mg, Oral, Daily, Chrissie Medici, DO, 40 mg at 03/02/23 4001  •  calcium carbonate (TUMS) chewable tablet 500 mg, 500 mg, Oral, TID PRN, GAURANG Keith, 500 mg at 03/01/23 1715  •  collagenase (SANTYL) ointment, , Topical, Daily, Chrissie Medici, DO, Given at 03/02/23 1621  •  DULoxetine (CYMBALTA) delayed release capsule 30 mg, 30 mg, Oral, Daily, Chrissie Medici, DO, 30 mg at 03/02/23 0846  •  enoxaparin (LOVENOX) subcutaneous injection 40 mg, 40 mg, Subcutaneous, Q24H [address], GAURANG Keith, 40 mg at 03/02/23 0846  •  hydrOXYzine HCL (ATARAX) tablet 10 mg, 10 mg, Oral, HS, Chrissie Medici, DO, 10 mg at 03/01/23 2158  •  insulin glargine (LANTUS) subcutaneous injection 5 Units 0 05 mL, 5 Units, Subcutaneous, HS, Chrissie Medici, DO, 5 Units at 03/01/23 2157  •  insulin lispro (HumaLOG) 100 units/mL subcutaneous injection 1-6 Units, 1-6 Units, Subcutaneous, TID AC, 2 Units at 03/02/23 1336 **AND** Fingerstick Glucose (POCT), , , 4x Daily AC and at bedtime, Chrissie Valencia DO  •  insulin lispro (HumaLOG) 100 units/mL subcutaneous injection 1-6 Units, 1-6 Units, Subcutaneous, HS, Chrissie Valencia DO, 1 Units at 02/28/23 2238  •  insulin lispro (HumaLOG) 100 units/mL subcutaneous injection 4 Units, 4 Units, Subcutaneous, TID With Meals, Andrew Alvarez DO, 4 Units at 03/02/23 1337  •  melatonin tablet 6 mg, 6 mg, Oral, HS PRN, Andrew Alvarez DO, 6 mg at 02/28/23 2238  •  methocarbamol (ROBAXIN) tablet 500 mg, 500 mg, Oral, BID PRN, Andrew Alvarez DO, 500 mg at 02/24/23 2120  •  midodrine (PROAMATINE) tablet 2 5 mg, 2 5 mg, Oral, BID AC, GAURANG Arias, 2 5 mg at 03/02/23 1135  •  ondansetron (ZOFRAN) injection 4 mg, 4 mg, Intravenous, Q4H PRN, Andrew Alvarez DO  •  oxyCODONE (ROXICODONE) IR tablet 2 5 mg, 2 5 mg, Oral, Q4H PRN, Andrew Alvarez DO  •  oxyCODONE (ROXICODONE) IR tablet 5 mg, 5 mg, Oral, Q4H PRN, Andrew Alvarez DO, 5 mg at 03/02/23 5083  •  pantoprazole (PROTONIX) EC tablet 40 mg, 40 mg, Oral, BID AC, Andrew Alvarez DO, 40 mg at 03/02/23 0579  •  simethicone (MYLICON) chewable tablet 80 mg, 80 mg, Oral, 4x Daily (with meals and at bedtime), Andrew Alvarez DO, 80 mg at 03/02/23 1336  •  sodium chloride tablet 1 g, 1 g, Oral, BID With Meals, GAURANG Arias, 1 g at 03/02/23 0846  •  tamsulosin (FLOMAX) capsule 0 4 mg, 0 4 mg, Oral, Daily With Dinner, Andrew Alvarez DO, 0 4 mg at 03/01/23 1715      ASSESSMENT:    Angela RICHRADS  Anson Barone is an engaging and pleasant 61year old  male who was admitted to [place] [address] (2/24/23) to regain strength and functioning s/p Debility secondary to sepsis (2/7/23)  He was previously admitted to [facility] December 2022 after medical complications from surgeries  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  He has maintained stable employment with [employer] working on [employer] the [employer] in [place] [place]  The trade offs for this stable employment are that he commutes to [employer] from the [employer] 6 days per week  Pt loves his job and career and very much wants to recover so that he can return to this work    He does recognize the physical and energy demands of singing and dancing on a [employer] show are a sharp contrast to his current functional status and he is trying to remain present focused to avoid feeling overwhelmed and distressed  He describes a supportive and close family and states he draws his strength and hope from his family and children  He feels very upset about the extent of complications from his surgery, as well as the potential progression of the cancer  He has struggled with bouts of anxiety and depression since he became ill and also responded well to interventions helping him to stay present focused with smaller present oriented goals  Pt  maintained good eye contact and engaged appropriately throughout the interview  He was seen in his room at bedside with his wife present  He presented as pleasant,  cooperative and established rapport without difficulty  Mood is positive today and pt feels he is regaining his strength already  He denies suicidal/homicidal ideation, intent or plan  No evidence of euphoria or emotional lability is present  He reports an increased appetite but functions best when eating smaller amounts  He continues to  push himself to eat to regain his strength  No evidence of poor boundaries was present  Pt  denies incidents of losing time or flash backs  No pressured speech, repetitive or perseverative behavior is present  No obsessive-compulsive or associated rituals  Pt's conversational speech was fluent and articulate with no evidence of word finding difficulty  Pt has maintained gains seen during his last stay in rehab  He remains positive and focused on regaining strength  He feels proud he was able to walk and do stairs today and feels his muscle memory from years of dance on [employer] is working for him  He will benefit from  supportive psychotherapy utilizing DBT and CBT strategies to help him maintain motivation, mood and coping during his rehab process  DIAGNOSIS:  Adjustment Disorder with Anxiety and Depression      RECOMMENDATIONS:   I will follow Mr Rowena Estrada during his stay to provide the following interventions:    · Supportive psychotherapy, utilizing CBT and mindfulness strategies  · DBT distress tolerance techniques  to improve coping and mood  · Meditation and relaxation training as tolerated      Thank you for the opportunity to participate in Mr Sommer's care  Kvng Aldridge, Ph D   Licensed Psychologist

## 2023-03-02 NOTE — PROGRESS NOTES
Progress Note - Infectious Disease   Danielaitz Paul 61 y o  male MRN: [id number]  Unit/Bed#: -01 Encounter: [id number]      Impression/Recommendations:  SIRS versus sepsis  WBC, HR, low-grade temp   Consider due to ongoing intraabdominal processes as below   Consider infection given temporal response to antibiotics   Consider tumor fever in setting of known liver metastases   Clinically stable and non-toxic  Rec:  • Continue to follow closely off antibiotics  • Follow temperatures and WBC closely  • Follow drain outputs closely  • If develops T>101 check blood cultures and consider repeat CT A/P     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   In setting of chronic cholecystostomy tube, perihepatic abscess as below   Alvina tube now with drainage consistent with this      Recent intra-abdominal abscess     In the setting complex surgical history as below   Initially developed 11/2022 accompanied by ESBL E  coli bacteremia   Status post exploratory laparotomy, right hemicolectomy, temporary abdominal closure 11/16; re-exploration, partial left colectomy, primary ileocolonic anastomosis with proximal diverting loop ileostomy, midline fascial closure on 11/17   Then developed abscess in hepatectomy bed, also collection +/- leak LUQ at area of prior colonic anastamosis   Status post IR drain into perihepatic collection 12/8   Cultures with ESBL E  Coli   Status post 7 days ertapenem   Developed recurrent sepsis and repeat CT 12/17 shows hepatic bed collection improved, persistent left intra-abdominal collection   Status post perigastic, perisplenic drains 12/20   Cultures again with ESBL E  Coli   Status post 21 days total antibiotics, completed 1/7   Left drains x2 removed 1/31   Midline hepatic bed drain removed 2/24   Collections improving off antibiotics          Possible acute cholecystitis     Status post percutaneous cholecystostomy tube   Alk phos remains markedly elevated, possibly due to drain itself versus known intrahepatic metastases      Chronic abdominal wound  In setting of prior incisional dehiscence   Status post recent debridement   Wound now appears clean      Metastatic colon cancer     To liver, possible lung   Status post resection, chemotherapy, more recent hepatic resection and ablation, transverse colon resection      CKD     Baseline Cr 1 4       DM      The above impression and plan was discussed in detail with the patient      Antibiotics:  None    Subjective:  Patient seen on AM rounds  Feels better today  Less tired  Some lightheadedness with sitting/standing since taking Atarax  24 Hour Events:  No documented fevers, chills, sweats, nausea, vomiting, or diarrhea  Objective:  Vitals:  Temp:  [97 7 °F (36 5 °C)-99 4 °F (37 4 °C)] 98 1 °F (36 7 °C)  HR:  [82-95] 82  Resp:  [17-19] 17  BP: (122-162)/(79-95) 130/90  SpO2:  [98 %-99 %] 99 %  Temp (24hrs), Av 4 °F (36 9 °C), Min:97 7 °F (36 5 °C), Max:99 4 °F (37 4 °C)  Current: Temperature: 98 1 °F (36 7 °C)    Physical Exam:   General:  No acute distress  Psychiatric:  Awake and alert  Pulmonary:  Normal respiratory excursion without accessory muscle use  Abdomen:  Soft, nontender  Extremities:  No edema  Skin:  No rashes    Lab Results:  I have personally reviewed pertinent labs    Results from last 7 days   Lab Units 23  0623 23  0607 23  0552 23  0532   POTASSIUM mmol/L 3 9 4 1 3 6 3 7   CHLORIDE mmol/L 104 104 103 101   CO2 mmol/L 20* 19* 21 20*   BUN mg/dL 14 16 16 14   CREATININE mg/dL 1 11 1 18 1 28 1 36*   EGFR ml/min/1 73sq m 72 67 61 56   CALCIUM mg/dL 8 3 8 2* 7 8* 7 6*   AST U/L  --   --   --  51*   ALT U/L  --   --   --  9*   ALK PHOS U/L  --   --   --  790*     Results from last 7 days   Lab Units 23  0632 23  0607 23  0619   WBC Thousand/uL 13 07* 13 41* 11 85*   HEMOGLOBIN g/dL 8 3* 8 7* 7 1*   PLATELETS Thousands/uL 363 355 313     Results from last 7 days   Lab Units 02/24/23  1525   MRSA CULTURE ONLY  No Methicillin Resistant Staphlyococcus aureus (MRSA) isolated       Imaging Studies:   I have personally reviewed pertinent imaging study reports and images in PACS  EKG, Pathology, and Other Studies:   I have personally reviewed pertinent reports

## 2023-03-02 NOTE — PROGRESS NOTES
03/02/23 1400   Pain Assessment   Pain Assessment Tool 0-10   Pain Score No Pain   Restrictions/Precautions   Precautions Fall Risk;Contact/isolation;Supervision on toilet/commode;Multiple lines   Weight Bearing Restrictions No   ROM Restrictions No   Cognition   Overall Cognitive Status WFL   Arousal/Participation Alert; Cooperative   Attention Within functional limits   Orientation Level Oriented X4   Memory Within functional limits   Following Commands Follows all commands and directions without difficulty   Subjective   Subjective Pt agreeable to perform skilled PT session   Sit to Stand   Type of Assistance Needed Adaptive equipment; Incidental touching   Comment CS with RW & CgA with SPC   Sit to Stand CARE Score 4   Bed-Chair Transfer   Type of Assistance Needed Incidental touching; Adaptive equipment   Comment  & [facility]   Chair/Bed-to-Chair Transfer CARE Score 4   Transfer Bed/Chair/Wheelchair   Adaptive Equipment Roller Walker;Cane   Walk 10 Feet   Type of Assistance Needed Physical assistance; Adaptive equipment   Physical Assistance Level Total assistance   Comment SPC with ModA & 2nd PTA present for safety   Walk 10 Feet CARE Score 1   Walk 50 Feet with Two Turns   Type of Assistance Needed Physical assistance; Adaptive equipment   Physical Assistance Level Total assistance   Comment SPC with ModA & 2nd PTA present for safety   Walk 50 Feet with Two Turns CARE Score 1   Ambulation   Primary Mode of Locomotion Prior to Admission Walk   Distance Walked (feet) 75 ft   Assist Device Roller Walker   Does the patient walk? 2  Yes   Toilet Transfer   Type of Assistance Needed Physical assistance; Adaptive equipment   Physical Assistance Level 26%-50%   Comment    Toilet Transfer CARE Score 3   Toilet Transfer   Surface Assessed Standard Toilet   Equipment Use   NuStep lvl 2 for 10min taking a 2 min break at 5min & 7 5min   Assessment   Treatment Assessment Pt participated in 60min skilled PT session focused on amb with SPC  Pt amb 60'x3 using SPC in RUE TA  Required ModAx1 & 2nd PTA present for safety  Pt had LOBx2 during turn  Pt HR 126bpm after amb  Pt performed NuStep lvl 2 for 10min taking a 2 min break at 5min & 7 5min  Pt HR after was 138bpm  Pt continues to be limited by decreased endurance & strength  In next session continue to amb using [facility] & perform FF  Pt was returned to room with all needs in reach & wife present  Family/Caregiver Present yes   Problem List Decreased strength;Decreased range of motion;Decreased endurance; Impaired balance;Decreased mobility;Pain   Barriers to Discharge Inaccessible home environment;Decreased caregiver support   PT Barriers   Functional Limitation Car transfers; Ramp negotiation;Stair negotiation;Standing;Transfers; Walking   Plan   Treatment/Interventions ADL retraining;Functional transfer training; Therapeutic exercise; Endurance training;Cognitive reorientation;Patient/family training;Equipment eval/education; Bed mobility; Compensatory technique education   Progress Progressing toward goals   Recommendation   PT Discharge Recommendation Home with home health rehabilitation   PT Therapy Minutes   PT Time In 1400   PT Time Out 1500   PT Total Time (minutes) 60   PT Mode of treatment - Individual (minutes) 60   PT Mode of treatment - Concurrent (minutes) 0   PT Mode of treatment - Group (minutes) 0   PT Mode of treatment - Co-treat (minutes) 0   PT Mode of Treatment - Total time(minutes) 60 minutes   PT Cumulative Minutes 550   Therapy Time missed   Time missed?  No

## 2023-03-02 NOTE — PROGRESS NOTES
Pastoral Care Progress Note    3/2/2023  Patient: Bob Mcginnis : 1965  Admission Date & Time: 2023 1458  MRN: [id number] CSN: [id number]        Pt and his spouse were busy with treatment/PT, and I just stuck my head in to say hello/welcome back/ is here and available to you

## 2023-03-02 NOTE — PROGRESS NOTES
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  • s/p diverting loop colostomy/liver biopsy with subsequent chemotherapy 2/2022  • s/p hepatic resection and reversal of colostomy on 11/7/22  • s/p ex-lap with bowel resection/VAC placement 11/16/22  • s/p right hemicolectomy with partial descending colectomy, colocolonic anastomosis with loop ileostomy and mid line abdominal VAC placement 11/17  • s/p additional drains placed for a perisplenic abscess and splenic abscess 12/20/22 (then had 3 drains plus the already existing perc radha tube)  • The 2 left lateral drains were removed by IR 1/31/23 as an OP  • Had previously been tx'd with Ertapenem  • This hospital stay had fever/leukocytosis/sepsis, started Meropenem and then to Vancomycin and Ertapenem  • Wound cx again = Ecoli ESBL  • LD Ertapenem 2/14, Vancomycin 2/16  • To IR 2/24 = Abscessogram of midline drain showed minimal persistent collection and the midline catheter was removed  • Recent wound debridement by general surgery prior to transfer = continue Santyl qd to wound     Acute cholecystitis  • s/p percutaneous tube placement 12/8/22  • CT scan was concerning for cholecystitis --> to IR 2/24 = showed retraction of the radha tube and interval occlusion of the cystic duct   +Cholecysto enteric fistula   Radha tube was exchanged    • Drainage from perc radha tube was milky light green on 2/27  • Surgery saw 2/27/23 = stable     PORT catheter  • On 2/24/23 in IR = port check showed probable small thrombus at tip of cathter, flushed/improved aspiration of the port     HTN/orthostasis/tachycardia  • D/c'd Norvasc 2 5mg qd since BPs were too low to get  • Did have some symptomatic orthostasis on 2/25/23 98 systolic and 4/83/87 with 87/57 standing (102/71 sitting)  • Continue TEDS  • On 3/1/23 AM SBP standing was 87 with HR up to 122 w/o sx and then with PT SBP 98 with sx  • Started Midodrine 2 5mg BID today 3/2/23 = to give at 0600 and 1100  • When OOB, his HR is low 100's     Diabetes mellitus  • Home:  Lantus 8U qam/Lispro 4U TID  • Here:  Lantus 5U qhs/Lispro 4U TID  • Has a DEXCOM meter and a regular meter at home  • Continue DM diet, QID Accuchecks/SSI  • No changes today     ALEXY/CKD III  • Baseline 1 2-1 4  • Creat was 2 5 on admission with a K+ level of 5 9  • Currently creat is 1 11     Anemia  • Transfused in December and January  • Hemoglobin was 7 1 on 2/26 = transfused x 1  • Hemoglobin stable     Situational depression  • Cymbalta      Left pleural effusion  • Thoracentesis 1/12 for 300ml  • Cyto from pleural fluid was negative for malignancy; cx = NG  • CT 2/22/23 = "Interval worsening of moderate right and small left pleural effusions"  • Will watch     Hyponatremia  • Mild  • Felt likely 2/2 SIADH in setting of malignancy  • On 2/27/23, added NACL 1 GM qd  • On 3/1/23, increased NACL tabs to BID     Malnutrition  • Had Magic cups BID ordered but didn't like them so stopped  • Prefers premier protein banana flavored shake --> wife to bring in   One bottle has 30 Gm protein and only 4 carbs     Low grade temp  • Was 100 3 on evening of 2/25 = afebrile since then  • Has had mild leukocytosis  • ID did see 2/27 = watching for now; if temp >101 then to check blood cxs and consider repeat CT of the abd/pelvis        Discharge date:  Team       The above assessment and plan was reviewed and updated as determined by my evaluation of the patient on 3/2/2023      Labs:   Results from last 7 days   Lab Units 02/28/23  0632 02/27/23  0607   WBC Thousand/uL 13 07* 13 41*   HEMOGLOBIN g/dL 8 3* 8 7*   HEMATOCRIT % 26 6* 27 3*   PLATELETS Thousands/uL 363 355     Results from last 7 days   Lab Units 03/01/23  8729 02/27/23  0607   SODIUM mmol/L 131* 131*   POTASSIUM mmol/L 3 9 4 1   CHLORIDE mmol/L 104 104   CO2 mmol/L 20* 19*   BUN mg/dL 14 16   CREATININE mg/dL 1 11 1 18   CALCIUM mg/dL 8 3 8 2*             Results from last 7 days   Lab Units 03/02/23  1037 03/02/23  0643 03/01/23  2120   POC GLUCOSE mg/dl 229* 129 114       Review of Scheduled Meds:  Current Facility-Administered Medications   Medication Dose Route Frequency Provider Last Rate   • acetaminophen  650 mg Oral Q4H PRN Ivette Hassan, DO     • aspirin  81 mg Oral Daily Ivette Hassan, DO     • atorvastatin  40 mg Oral Daily Ivette Hassan, DO     • calcium carbonate  500 mg Oral TID PRN GAURANG Burton     • collagenase   Topical Daily Xochitln Sonya, DO     • DULoxetine  30 mg Oral Daily Ivette Hassan, DO     • enoxaparin  40 mg Subcutaneous Q24H [address] GAURANG Keith     • hydrOXYzine HCL  10 mg Oral HS Ivette Hassan, DO     • insulin glargine  5 Units Subcutaneous HS Ivette Hassan, DO     • insulin lispro  1-6 Units Subcutaneous TID AC Ivette Hassan, DO     • insulin lispro  1-6 Units Subcutaneous HS Ivette Hassan, DO     • insulin lispro  4 Units Subcutaneous TID With Meals Ivette Hassan, DO     • melatonin  6 mg Oral HS PRN Ivette Hassan, DO     • methocarbamol  500 mg Oral BID PRN Ivette Hassan, DO     • midodrine  2 5 mg Oral BID AC GAURANG Titus     • ondansetron  4 mg Intravenous Q4H PRN Ivette Hassan, DO     • oxyCODONE  2 5 mg Oral Q4H PRN Ivette Hassan, DO     • oxyCODONE  5 mg Oral Q4H PRN Ivette Hassan, DO     • pantoprazole  40 mg Oral BID AC Ivette Hassan, DO     • simethicone  80 mg Oral 4x Daily (with meals and at bedtime) Ivette Hassan DO     • sodium chloride  1 g Oral BID With Meals GAURANG Cardona     • tamsulosin  0 4 mg Oral Daily With Dinner Ivette Hassan DO         Subjective/ HPI: Patient seen and examined   Patients overnight issues or events were reviewed with nursing or staff during rounds or morning huddle session  New or overnight issues include the following:     No new or overnight issues  Felt better today doing therapy and had no dizziness    ROS:   A 10 point ROS was performed; negative except as noted above  Imaging:     No orders to display       *Labs /Radiology studies reviewed  *Medications reviewed and reconciled as needed  *Please refer to order section for additional ordered labs studies  *Case discussed with primary attending during morning huddle case rounds    Physical Examination:  Vitals:   Vitals:    03/01/23 1315 03/01/23 2100 03/02/23 0640 03/02/23 0647   BP: 122/79 153/87 162/95 130/90   BP Location: Right arm Right arm Right arm Right arm   Pulse: 95 85 82    Resp: 19 18 17    Temp: 97 7 °F (36 5 °C) 99 4 °F (37 4 °C) 98 1 °F (36 7 °C)    TempSrc: Oral Oral Oral    SpO2: 99% 98% 99%    Weight:       Height:           General Appearance: no distress, conversive  HEENT: PERRLA, conjuctiva normal; oropharynx clear; mucous membranes moist   Neck:  Supple, normal ROM  Lungs: CTA, normal respiratory effort, no retractions, expiratory effort normal  CV: regular rate and rhythm; no rubs/murmurs/gallops, PMI normal   ABD: soft; ND/NT; +BS; ostomy watery yellow stool  Perc radha tube draining milky light green  EXT: no edema  Skin: normal turgor, normal texture, no rashes  Psych: affect normal, mood normal  Neuro: AAO      The above physical exam was reviewed and updated as determined by my evaluation of the patient on 3/2/2023  Invasive Devices     Central Venous Catheter Line  Duration           Port A Cath 03/10/22 Right Chest 357 days          Drain  Duration           Ileostomy  days    Cholecystostomy Tube 6 days                   VTE Pharmacologic Prophylaxis: Enoxaparin  Code Status: Level 1 - Full Code  Current Length of Stay: 6 day(s)      Total time spent:  30 minutes with more than 50% spent counseling/coordinating care   Counseling includes discussion with patient re: progress  and discussion with patient of his/her current medical state/information  Coordination of patient's care was performed in conjunction with primary service  Time invested included review of patient's labs, vitals, and management of their comorbidities with continued monitoring  In addition, this patient was discussed with medical team including physician and advanced extenders  The care of the patient was extensively discussed and appropriate treatment plan was formulated unique for this patient  Medical decision making for the day was made by supervising physician unless otherwise noted in their attestation statement  ** Please Note:  voice to text software may have been used in the creation of this document   Although proof errors in transcription or interpretation are a potential of such software**

## 2023-03-03 LAB
ANION GAP SERPL CALCULATED.3IONS-SCNC: 9 MMOL/L (ref 4–13)
BASOPHILS # BLD AUTO: 0.06 THOUSANDS/ÂΜL (ref 0–0.1)
BASOPHILS NFR BLD AUTO: 1 % (ref 0–1)
BUN SERPL-MCNC: 16 MG/DL (ref 5–25)
CALCIUM SERPL-MCNC: 8.5 MG/DL (ref 8.3–10.1)
CHLORIDE SERPL-SCNC: 101 MMOL/L (ref 96–108)
CO2 SERPL-SCNC: 19 MMOL/L (ref 21–32)
CREAT SERPL-MCNC: 1.08 MG/DL (ref 0.6–1.3)
EOSINOPHIL # BLD AUTO: 0 THOUSAND/ÂΜL (ref 0–0.61)
EOSINOPHIL NFR BLD AUTO: 0 % (ref 0–6)
ERYTHROCYTE [DISTWIDTH] IN BLOOD BY AUTOMATED COUNT: 16.7 % (ref 11.6–15.1)
GFR SERPL CREATININE-BSD FRML MDRD: 75 ML/MIN/1.73SQ M
GLUCOSE SERPL-MCNC: 110 MG/DL (ref 65–140)
GLUCOSE SERPL-MCNC: 131 MG/DL (ref 65–140)
GLUCOSE SERPL-MCNC: 145 MG/DL (ref 65–140)
GLUCOSE SERPL-MCNC: 194 MG/DL (ref 65–140)
GLUCOSE SERPL-MCNC: 195 MG/DL (ref 65–140)
HCT VFR BLD AUTO: 30.2 % (ref 36.5–49.3)
HGB BLD-MCNC: 9.4 G/DL (ref 12–17)
IMM GRANULOCYTES # BLD AUTO: 0.25 THOUSAND/UL (ref 0–0.2)
IMM GRANULOCYTES NFR BLD AUTO: 2 % (ref 0–2)
LYMPHOCYTES # BLD AUTO: 0.96 THOUSANDS/ÂΜL (ref 0.6–4.47)
LYMPHOCYTES NFR BLD AUTO: 7 % (ref 14–44)
MCH RBC QN AUTO: 26 PG (ref 26.8–34.3)
MCHC RBC AUTO-ENTMCNC: 31.1 G/DL (ref 31.4–37.4)
MCV RBC AUTO: 83 FL (ref 82–98)
MONOCYTES # BLD AUTO: 1.58 THOUSAND/ÂΜL (ref 0.17–1.22)
MONOCYTES NFR BLD AUTO: 12 % (ref 4–12)
NEUTROPHILS # BLD AUTO: 10.38 THOUSANDS/ÂΜL (ref 1.85–7.62)
NEUTS SEG NFR BLD AUTO: 78 % (ref 43–75)
NRBC BLD AUTO-RTO: 0 /100 WBCS
PLATELET # BLD AUTO: 371 THOUSANDS/UL (ref 149–390)
PMV BLD AUTO: 10.1 FL (ref 8.9–12.7)
POTASSIUM SERPL-SCNC: 3.8 MMOL/L (ref 3.5–5.3)
RBC # BLD AUTO: 3.62 MILLION/UL (ref 3.88–5.62)
SODIUM SERPL-SCNC: 129 MMOL/L (ref 135–147)
TSH SERPL DL<=0.05 MIU/L-ACNC: 1.35 UIU/ML (ref 0.45–4.5)
WBC # BLD AUTO: 13.23 THOUSAND/UL (ref 4.31–10.16)

## 2023-03-03 RX ORDER — SODIUM CHLORIDE 1000 MG
2 TABLET, SOLUBLE MISCELLANEOUS 2 TIMES DAILY WITH MEALS
Status: DISCONTINUED | OUTPATIENT
Start: 2023-03-03 | End: 2023-03-09

## 2023-03-03 RX ADMIN — SIMETHICONE 80 MG: 80 TABLET, CHEWABLE ORAL at 08:03

## 2023-03-03 RX ADMIN — INSULIN GLARGINE 5 UNITS: 100 INJECTION, SOLUTION SUBCUTANEOUS at 22:31

## 2023-03-03 RX ADMIN — OXYCODONE HYDROCHLORIDE 5 MG: 5 TABLET ORAL at 17:46

## 2023-03-03 RX ADMIN — INSULIN LISPRO 2 UNITS: 100 INJECTION, SOLUTION INTRAVENOUS; SUBCUTANEOUS at 11:01

## 2023-03-03 RX ADMIN — HYDROXYZINE HYDROCHLORIDE 10 MG: 10 TABLET ORAL at 22:32

## 2023-03-03 RX ADMIN — ONDANSETRON 4 MG: 2 INJECTION INTRAMUSCULAR; INTRAVENOUS at 08:27

## 2023-03-03 RX ADMIN — SIMETHICONE 80 MG: 80 TABLET, CHEWABLE ORAL at 17:34

## 2023-03-03 RX ADMIN — INSULIN LISPRO 4 UNITS: 100 INJECTION, SOLUTION INTRAVENOUS; SUBCUTANEOUS at 08:04

## 2023-03-03 RX ADMIN — DULOXETINE HYDROCHLORIDE 30 MG: 30 CAPSULE, DELAYED RELEASE ORAL at 08:04

## 2023-03-03 RX ADMIN — SIMETHICONE 80 MG: 80 TABLET, CHEWABLE ORAL at 11:00

## 2023-03-03 RX ADMIN — INSULIN LISPRO 2 UNITS: 100 INJECTION, SOLUTION INTRAVENOUS; SUBCUTANEOUS at 17:39

## 2023-03-03 RX ADMIN — METHOCARBAMOL 500 MG: 500 TABLET ORAL at 08:04

## 2023-03-03 RX ADMIN — INSULIN LISPRO 4 UNITS: 100 INJECTION, SOLUTION INTRAVENOUS; SUBCUTANEOUS at 17:39

## 2023-03-03 RX ADMIN — PANTOPRAZOLE SODIUM 40 MG: 40 TABLET, DELAYED RELEASE ORAL at 06:26

## 2023-03-03 RX ADMIN — PANTOPRAZOLE SODIUM 40 MG: 40 TABLET, DELAYED RELEASE ORAL at 17:39

## 2023-03-03 RX ADMIN — INSULIN LISPRO 4 UNITS: 100 INJECTION, SOLUTION INTRAVENOUS; SUBCUTANEOUS at 11:01

## 2023-03-03 RX ADMIN — TAMSULOSIN HYDROCHLORIDE 0.4 MG: 0.4 CAPSULE ORAL at 17:34

## 2023-03-03 RX ADMIN — ONDANSETRON 4 MG: 2 INJECTION INTRAMUSCULAR; INTRAVENOUS at 17:46

## 2023-03-03 RX ADMIN — OXYCODONE HYDROCHLORIDE 5 MG: 5 TABLET ORAL at 22:32

## 2023-03-03 RX ADMIN — ASPIRIN 81 MG CHEWABLE TABLET 81 MG: 81 TABLET CHEWABLE at 08:04

## 2023-03-03 RX ADMIN — SIMETHICONE 80 MG: 80 TABLET, CHEWABLE ORAL at 22:32

## 2023-03-03 RX ADMIN — ENOXAPARIN SODIUM 40 MG: 40 INJECTION SUBCUTANEOUS at 08:04

## 2023-03-03 RX ADMIN — ATORVASTATIN CALCIUM 40 MG: 40 TABLET, FILM COATED ORAL at 08:04

## 2023-03-03 RX ADMIN — SODIUM CHLORIDE 1 G: 1 TABLET ORAL at 08:03

## 2023-03-03 RX ADMIN — SODIUM CHLORIDE 2 G: 1 TABLET ORAL at 17:35

## 2023-03-03 RX ADMIN — COLLAGENASE SANTYL: 250 OINTMENT TOPICAL at 08:04

## 2023-03-03 NOTE — PROGRESS NOTES
03/03/23 1330   Pain Assessment   Pain Assessment Tool 0-10   Pain Score 5   Pain Location/Orientation Location: Abdomen   Restrictions/Precautions   Precautions Fall Risk;Contact/isolation;Supervision on toilet/commode   Cognition   Overall Cognitive Status WFL   Subjective   Subjective " yesterday wore me out and i'm still feeling it today"   Sit to Lying   Type of Assistance Needed Supervision   Sit to Lying CARE Score 4   Lying to Sitting on Side of Bed   Type of Assistance Needed Supervision   Lying to Sitting on Side of Bed CARE Score 4   Sit to Stand   Type of Assistance Needed Supervision   Sit to Stand CARE Score 4   Bed-Chair Transfer   Type of Assistance Needed Supervision   Comment CS with SPc   Chair/Bed-to-Chair Transfer CARE Score 4   Walk 10 Feet   Type of Assistance Needed Incidental touching;Supervision; Adaptive equipment   Comment CS/CGA with SPC   Walk 10 Feet CARE Score 4   Walk 50 Feet with Two Turns   Type of Assistance Needed Incidental touching;Supervision; Adaptive equipment   Comment Cs/CGA with SPC   Walk 50 Feet with Two Turns CARE Score 4   Walk 150 Feet   Type of Assistance Needed Incidental touching;Supervision; Adaptive equipment   Comment CS/CGA with SPC   Walk 150 Feet CARE Score 4   Ambulation   Distance Walked (feet) 75 ft  (x2; 20'x6 with activity)   Assist Device Cane   Gait Pattern Inconsistant Josefina; Slow Josefina; Improper weight shift   Limitations Noted In Balance; Endurance;Speed;Strength   Findings cont to work on household distances with cane and improving gait speed   Stairs   Findings pt tired today and did not want to do stairs; please have pt do stairs over the weekend   Therapeutic Interventions   Flexibility B heel cord and HS x5mins   Balance stepping over smaller bolsters on the ground 20'x4, stepping on balance discs 20x'4,  amb with cane and carrying cup of water 75'   Equipment Use   NuStep 10mins, L2, LEs only   Assessment   Treatment Assessment pt tolerated tx well with focus on dual tasks to help improve balance and stability when walking wtih SPC  pt still below baseline and decrease endurance limiting overall activity tolerance  will benefit from cont strengthening and endurance with gait training with SPC to maximize functional ind for d/c  Problem List Decreased strength;Decreased range of motion;Decreased endurance; Impaired balance;Decreased mobility;Pain   Barriers to Discharge Inaccessible home environment;Decreased caregiver support   PT Barriers   Functional Limitation Car transfers; Ramp negotiation;Stair negotiation;Standing;Transfers; Walking   Plan   Progress Progressing toward goals   Recommendation   PT Discharge Recommendation Home with home health rehabilitation   Equipment Recommended Walker   PT Therapy Minutes   PT Time In 1330   PT Time Out 1500   PT Total Time (minutes) 90   PT Mode of treatment - Individual (minutes) 90   PT Mode of treatment - Concurrent (minutes) 0   PT Mode of treatment - Group (minutes) 0   PT Mode of treatment - Co-treat (minutes) 0   PT Mode of Treatment - Total time(minutes) 90 minutes   PT Cumulative Minutes 640   Therapy Time missed   Time missed?  No

## 2023-03-03 NOTE — PROGRESS NOTES
Progress Note - Infectious Disease   Malcom Daugherty 61 y o  male MRN: [id number]  Unit/Bed#: -01 Encounter: [id number]      Impression/Recommendations:  SIRS versus sepsis  WBC, HR, low-grade temp   Consider due to ongoing intraabdominal processes as below   Consider infection given temporal response to antibiotics   Consider tumor fever in setting of known liver metastases   Clinically stable and non-toxic  Rec:  • Continue to follow closely off antibiotics  • Follow temperatures and WBC closely  • Follow drain outputs closely  • If develops T>101 check blood cultures and consider repeat CT A/P     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   In setting of chronic cholecystostomy tube, perihepatic abscess as below   Alvina tube now with drainage consistent with this      Recent intra-abdominal abscess     In the setting complex surgical history as below   Initially developed 11/2022 accompanied by ESBL E  coli bacteremia   Status post exploratory laparotomy, right hemicolectomy, temporary abdominal closure 11/16; re-exploration, partial left colectomy, primary ileocolonic anastomosis with proximal diverting loop ileostomy, midline fascial closure on 11/17   Then developed abscess in hepatectomy bed, also collection +/- leak LUQ at area of prior colonic anastamosis   Status post IR drain into perihepatic collection 12/8   Cultures with ESBL E  Coli   Status post 7 days ertapenem   Developed recurrent sepsis and repeat CT 12/17 shows hepatic bed collection improved, persistent left intra-abdominal collection   Status post perigastic, perisplenic drains 12/20   Cultures again with ESBL E  Coli   Status post 21 days total antibiotics, completed 1/7   Left drains x2 removed 1/31   Midline hepatic bed drain removed 2/24   Collections improving off antibiotics          Possible acute cholecystitis     Status post percutaneous cholecystostomy tube   Alk phos remains markedly elevated, possibly due to drain itself versus known intrahepatic metastases      Chronic abdominal wound  In setting of prior incisional dehiscence   Status post recent debridement   Wound now appears clean      Metastatic colon cancer     To liver, possible lung   Status post resection, chemotherapy, more recent hepatic resection and ablation, transverse colon resection      CKD     Baseline Cr 1 4       DM      The above impression and plan was discussed in detail with the patient  The patient is stable from an ID standpoint  We will sign off for now  Please call with new questions      Antibiotics:  None    Subjective:  Patient seen on PM rounds  Tired today  Walked up and down a whole flight of stairs yesterday  Excited to transition from a walker to a cane  Poor appetite  24 Hour Events:  No documented fevers, chills, sweats, nausea, vomiting, or diarrhea  Objective:  Vitals:  Temp:  [98 3 °F (36 8 °C)-99 3 °F (37 4 °C)] 98 3 °F (36 8 °C)  HR:  [] 92  Resp:  [16-18] 18  BP: (137-151)/(82-89) 138/82  SpO2:  [95 %-96 %] 96 %  Temp (24hrs), Av 7 °F (37 1 °C), Min:98 3 °F (36 8 °C), Max:99 3 °F (37 4 °C)  Current: Temperature: 98 3 °F (36 8 °C)    Physical Exam:   General:  No acute distress  Psychiatric:  Awake and alert  Pulmonary:  Normal respiratory excursion without accessory muscle use  Abdomen:  Soft, nontender  Extremities:  No edema  Skin:  No rashes    Lab Results:  I have personally reviewed pertinent labs    Results from last 7 days   Lab Units 23  0623  0623 23  0607   POTASSIUM mmol/L 3 8 3 9 4 1   CHLORIDE mmol/L 101 104 104   CO2 mmol/L 19* 20* 19*   BUN mg/dL 16 14 16   CREATININE mg/dL 1 08 1 11 1 18   EGFR ml/min/1 73sq m 75 72 67   CALCIUM mg/dL 8 5 8 3 8 2*     Results from last 7 days   Lab Units 23  0627 23  0632 23  0607   WBC Thousand/uL 13 23* 13 07* 13 41*   HEMOGLOBIN g/dL 9 4* 8 3* 8 7*   PLATELETS Thousands/uL 371 363 355           Imaging Studies: I have personally reviewed pertinent imaging study reports and images in PACS  EKG, Pathology, and Other Studies:   I have personally reviewed pertinent reports

## 2023-03-03 NOTE — PROGRESS NOTES
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  • s/p diverting loop colostomy/liver biopsy with subsequent chemotherapy 2/2022  • s/p hepatic resection and reversal of colostomy on 11/7/22  • s/p ex-lap with bowel resection/VAC placement 11/16/22  • s/p right hemicolectomy with partial descending colectomy, colocolonic anastomosis with loop ileostomy and mid line abdominal VAC placement 11/17  • s/p additional drains placed for a perisplenic abscess and splenic abscess 12/20/22 (then had 3 drains plus the already existing perc radha tube)  • The 2 left lateral drains were removed by IR 1/31/23 as an OP  • Had previously been tx'd with Ertapenem  • This hospital stay had fever/leukocytosis/sepsis, started Meropenem and then to Vancomycin and Ertapenem  • Wound cx again = Ecoli ESBL  • LD Ertapenem 2/14, Vancomycin 2/16  • To IR 2/24 = Abscessogram of midline drain showed minimal persistent collection and the midline catheter was removed  • Recent wound debridement by general surgery prior to transfer = continue Santyl qd to wound     Acute cholecystitis  • s/p percutaneous tube placement 12/8/22  • CT scan was concerning for cholecystitis --> to IR 2/24 = showed retraction of the radha tube and interval occlusion of the cystic duct   +Cholecysto enteric fistula   Radha tube was exchanged    • Drainage from perc radha tube is milky light green (20ml on 3/2/23)  • Surgery saw 2/27/23 = stable     PORT catheter  • On 2/24/23 in IR = port check showed probable small thrombus at tip of cathter, flushed/improved aspiration of the port     HTN/orthostasis/tachycardia  • D/c'd Norvasc 2 5mg qd since BPs were too low to get  • Did have some symptomatic orthostasis on 2/25/23 98 systolic and 8/18/82 with 87/57 standing (102/71 sitting)  • Continue TEDS  • On 3/1/23 AM SBP standing was 87 with HR up to 122 w/o sx and then with PT SBP 98 with sx  • Started Midodrine 2 5mg BID on 3/2/23 = to give at 0600 and 1100 --> held this AM and lunchtime since SBPs were 140-150   • When OOB, his HR are low 100's and 120-130 standing  • Will check TSH     Diabetes mellitus  • Home:  Lantus 8U qam/Lispro 4U TID  • Here:  Lantus 5U qhs/Lispro 4U TID  • Has a DEXCOM meter and a regular meter at home  • Continue DM diet, QID Accuchecks/SSI  • No changes today     ALEXY/CKD III  • Baseline 1 2-1 4  • Creat was 2 5 on admission with a K+ level of 5 9  • Currently creat is 1 11     Anemia  • Transfused in December and January  • Hemoglobin was 7 1 on 2/26 = transfused x 1  • Hemoglobin stable     Situational depression  • Cymbalta      Left pleural effusion  • Thoracentesis 1/12 for 300ml  • Cyto from pleural fluid was negative for malignancy; cx = NG  • CT 2/22/23 = "Interval worsening of moderate right and small left pleural effusions"  • Will watch     Hyponatremia  • Mild  • Felt likely 2/2 SIADH in setting of malignancy  • On 2/27/23, added NACL 1 GM qd  • On 3/1/23, increased NACL tabs to BID  • Increase today to 2Gm BID  • This AM vomited the 1Gm dose but he said it was because it gagged him, not nauseated     Malnutrition  • Had Magic cups BID ordered but didn't like them so stopped  • Prefers premier protein banana flavored shake --> wife to bring in   One bottle has 30 Gm protein and only 4 carbs     Low grade temp  • Was 100 3 on evening of 2/25 = afebrile since then  • Has had mild leukocytosis  • ID did see 2/27 = watching for now; if temp >101 then to check blood cxs and consider repeat CT of the abd/pelvis        Discharge date:  Team       The above assessment and plan was reviewed and updated as determined by my evaluation of the patient on 3/3/2023      Labs:   Results from last 7 days   Lab Units 03/03/23  0627 02/28/23  0632   WBC Thousand/uL 13 23* 13 07*   HEMOGLOBIN g/dL 9 4* 8 3*   HEMATOCRIT % 30 2* 26 6*   PLATELETS Thousands/uL 371 363     Results from last 7 days   Lab Units 03/03/23  0627 03/01/23  0623   SODIUM mmol/L 129* 131*   POTASSIUM mmol/L 3 8 3 9   CHLORIDE mmol/L 101 104   CO2 mmol/L 19* 20*   BUN mg/dL 16 14   CREATININE mg/dL 1 08 1 11   CALCIUM mg/dL 8 5 8 3             Results from last 7 days   Lab Units 03/03/23  1044 03/03/23  0640 03/02/23  2110   POC GLUCOSE mg/dl 195* 110 129       Review of Scheduled Meds:  Current Facility-Administered Medications   Medication Dose Route Frequency Provider Last Rate   • acetaminophen  650 mg Oral Q4H PRN Dave Flow, DO     • aspirin  81 mg Oral Daily Dave Flow, DO     • atorvastatin  40 mg Oral Daily Dave Flow, DO     • calcium carbonate  500 mg Oral TID PRN GAURANG Alford     • collagenase   Topical Daily Dave Flow, DO     • DULoxetine  30 mg Oral Daily Dave Flow, DO     • enoxaparin  40 mg Subcutaneous Q24H [facility] GAURANG Keith     • hydrOXYzine HCL  10 mg Oral HS Dave Flow, DO     • insulin glargine  5 Units Subcutaneous HS Dave Flow, DO     • insulin lispro  1-6 Units Subcutaneous TID AC Dave Flow, DO     • insulin lispro  1-6 Units Subcutaneous HS Dave Flow, DO     • insulin lispro  4 Units Subcutaneous TID With Meals Dave Flow, DO     • melatonin  6 mg Oral HS PRN Dave Flow, DO     • methocarbamol  500 mg Oral BID PRN Dave Flow, DO     • midodrine  2 5 mg Oral BID AC GAURANG Coyle     • ondansetron  4 mg Intravenous Q4H PRN Dave Flow, DO     • oxyCODONE  2 5 mg Oral Q4H PRN Dave Flow, DO     • oxyCODONE  5 mg Oral Q4H PRN Dave Flow, DO     • pantoprazole  40 mg Oral BID AC Dave Flow, DO     • simethicone  80 mg Oral 4x Daily (with meals and at bedtime) Dave Flow, DO     • sodium chloride  2 g Oral BID With Meals GAURANG Rodriguez • tamsulosin  0 4 mg Oral Daily [facility] [place]         Subjective/ HPI: Patient seen and examined  Patients overnight issues or events were reviewed with nursing or staff during rounds or morning huddle session  New or overnight issues include the following:     No new or overnight issues  Offers no complaints    ROS:   A 10 point ROS was performed; negative except as noted above  Imaging:     No orders to display       *Labs /Radiology studies reviewed  *Medications reviewed and reconciled as needed  *Please refer to order section for additional ordered labs studies  *Case discussed with primary attending during morning huddle case rounds    Physical Examination:  Vitals:   Vitals:    03/02/23 1425 03/02/23 2052 03/03/23 0544 03/03/23 0900   BP: 114/72 137/89 151/84 141/84   BP Location: Right arm Right arm Right arm Right leg   Pulse: (!) 108 105 101 100   Resp: 18 18 17 16   Temp: 97 9 °F (36 6 °C) 99 3 °F (37 4 °C) 98 7 °F (37 1 °C) 98 4 °F (36 9 °C)   TempSrc: Oral Oral Oral    SpO2: 96% 96% 96% 95%   Weight:       Height:           General Appearance: no distress, conversive  HEENT:  External ear normal   Nose normal w/o drainage  Mucous membranes are moist  Oropharynx is clear  Conjunctiva clear w/o icterus or redness  Neck:  Supple, normal ROM  Lungs: BBS without crackles/wheeze/rhonchi; respirations unlabored with normal inspiratory/expiratory effort  No retractions noted  On RA  CV: regular rate and rhythm; no rubs/murmurs/gallops, PMI normal   ABD: Abdomen is soft  Bowel sounds all quadrants  Nontender with no distention  Ostomy with watery yellow stool  Perc radha tube with milky green drainage  EXT: no edema  Skin: normal turgor, normal texture, no rashes  Psych: affect normal, mood normal  Neuro: AAO      The above physical exam was reviewed and updated as determined by my evaluation of the patient on 3/3/2023      Invasive Devices     Central Venous Catheter Line Duration           Port A Cath 03/10/22 Right Chest 358 days          Drain  Duration           Ileostomy  days    Cholecystostomy Tube 7 days                   VTE Pharmacologic Prophylaxis: Enoxaparin  Code Status: Level 1 - Full Code  Current Length of Stay: 7 day(s)      Total time spent:  30 minutes with more than 50% spent counseling/coordinating care  Counseling includes discussion with patient re: progress  and discussion with patient of his/her current medical state/information  Coordination of patient's care was performed in conjunction with primary service  Time invested included review of patient's labs, vitals, and management of their comorbidities with continued monitoring  In addition, this patient was discussed with medical team including physician and advanced extenders  The care of the patient was extensively discussed and appropriate treatment plan was formulated unique for this patient  Medical decision making for the day was made by supervising physician unless otherwise noted in their attestation statement  ** Please Note:  voice to text software may have been used in the creation of this document   Although proof errors in transcription or interpretation are a potential of such software**

## 2023-03-03 NOTE — PROGRESS NOTES
03/03/23 0830   Pain Assessment   Pain Assessment Tool 0-10   Pain Score No Pain   Restrictions/Precautions   Precautions Fall Risk;Contact/isolation;Supervision on toilet/commode;Multiple lines   Weight Bearing Restrictions No   ROM Restrictions No   Oral Hygiene   Type of Assistance Needed Set-up / clean-up   Physical Assistance Level No physical assistance   Comment seated in w/c at sink 2* to fatigue and dizziness  Oral Hygiene CARE Score 5   Shower/Bathe Self   Type of Assistance Needed Incidental touching   Physical Assistance Level No physical assistance   Comment seated for distal LE bathing, in stance for rear/james hygiene  Shower/Bathe Self CARE Score 4   Bathing   Assessed Bath Style Sponge Bath   Able to Gather/Transport No   Able to [employer] Temperature Yes   Able to Wash/Rinse/Dry (body part) Left Arm;Right Arm;L Upper Leg;R Upper Leg;L Lower Leg/Foot;R Lower Leg/Foot;Chest;Abdomen;Perineal Area; Buttocks   Limitations Noted in Balance; Endurance;Strength   Positioning Seated;Standing   Tub/Shower Transfer   Reason Not Assessed Sponge Bath   Upper Body Dressing   Type of Assistance Needed Set-up / clean-up   Physical Assistance Level No physical assistance   Comment seated in w/c   Upper Body Dressing CARE Score 5   Lower Body Dressing   Type of Assistance Needed Supervision   Physical Assistance Level No physical assistance   Comment Threads while seated and in stance for CM  Lower Body Dressing CARE Score 4   Lying to Sitting on Side of Bed   Type of Assistance Needed Supervision   Physical Assistance Level No physical assistance   Lying to Sitting on Side of Bed CARE Score 4   Sit to Stand   Type of Assistance Needed Supervision   Physical Assistance Level No physical assistance   Sit to Stand CARE Score 4   Bed-Chair Transfer   Type of Assistance Needed Incidental touching; Adaptive equipment   Physical Assistance Level No physical assistance   Comment CGA with no AD, CS w RW Chair/Bed-to-Chair Transfer CARE Score 4   Exercise Tools   Other Exercise Tool 1 Gentle tabletop stretching with slow hold at end range to reduce UB stiffness while moist heat was applied for 15 minutes  Skin in tact before and after treatment session  Cognition   Overall Cognitive Status WFL   Arousal/Participation Alert; Cooperative   Attention Within functional limits   Orientation Level Oriented X4   Memory Within functional limits   Following Commands Follows all commands and directions without difficulty   Activity Tolerance   Activity Tolerance Patient tolerated treatment well   Assessment   Treatment Assessment Pt participated in skilled OT services with focus on ADL retraining, functional txfers, and stretching  Pt continues to be limited by dec act julia, pain, dec endurance, dec strength, dec standing julia/balance  Pt making gradual progress demonstrating ability to move at a CS level with RW, progressing to use of SPC and no AD with overall CGA/Ada warranted  Pt will continue to benefit from skilled OT services with focus on above mentioned deficits to inc safety and independence with I/ADL tasks  Prognosis Good   Problem List Decreased strength;Decreased range of motion;Decreased endurance; Impaired balance;Decreased mobility;Pain   Plan   Treatment/Interventions ADL retraining;Functional transfer training; Therapeutic exercise; Endurance training;Patient/family training;Equipment eval/education; Bed mobility; Compensatory technique education   Progress Progressing toward goals   Recommendation   OT Discharge Recommendation   (pending progress)   OT Therapy Minutes   OT Time In 0830   OT Time Out 1000   OT Total Time (minutes) 90   OT Mode of treatment - Individual (minutes) 90   OT Mode of treatment - Concurrent (minutes) 0   OT Mode of treatment - Group (minutes) 0   OT Mode of treatment - Co-treat (minutes) 0   OT Mode of Treatment - Total time(minutes) 90 minutes   OT Cumulative Minutes 510   Therapy Time missed   Time missed?  No

## 2023-03-03 NOTE — PLAN OF CARE
Problem: PAIN - ADULT  Goal: Verbalizes/displays adequate comfort level or baseline comfort level  Description: Interventions:  - Encourage patient to monitor pain and request assistance  - Assess pain using appropriate pain scale  - Administer analgesics based on type and severity of pain and evaluate response  - Implement non-pharmacological measures as appropriate and evaluate response  - Consider cultural and social influences on pain and pain management  - Notify physician/advanced practitioner if interventions unsuccessful or patient reports new pain  Outcome: Progressing     Problem: INFECTION - ADULT  Goal: Absence or prevention of progression during hospitalization  Description: INTERVENTIONS:  - Assess and monitor for signs and symptoms of infection  - Monitor lab/diagnostic results  - Monitor all insertion sites, i e  indwelling lines, tubes, and drains  - Monitor endotracheal if appropriate and nasal secretions for changes in amount and color  - [place] appropriate cooling/warming therapies per order  - Administer medications as ordered  - Instruct and encourage patient and family to use good hand hygiene technique  - Identify and instruct in appropriate isolation precautions for identified infection/condition  Outcome: Progressing  Goal: Absence of fever/infection during neutropenic period  Description: INTERVENTIONS:  - Monitor WBC    Outcome: Progressing     Problem: SAFETY ADULT  Goal: Patient will remain free of falls  Description: INTERVENTIONS:  - Educate patient/family on patient safety including physical limitations  - Instruct patient to call for assistance with activity   - Consult OT/PT to assist with strengthening/mobility   - Keep Call bell within reach  - Keep bed low and locked with side rails adjusted as appropriate  - Keep care items and personal belongings within reach  - Initiate and maintain comfort rounds  - Make Fall Risk Sign visible to staff  - Offer Toileting every  Hours, in advance of need  - Initiate/Maintain alarm  - Obtain necessary fall risk management equipment:   - Apply yellow socks and bracelet for high fall risk patients  - Consider moving patient to room near nurses station  Outcome: Progressing  Goal: Maintain or return to baseline ADL function  Description: INTERVENTIONS:  -  Assess patient's ability to carry out ADLs; assess patient's baseline for ADL function and identify physical deficits which impact ability to perform ADLs (bathing, care of mouth/teeth, toileting, grooming, dressing, etc )  - Assess/evaluate cause of self-care deficits   - Assess range of motion  - Assess patient's mobility; develop plan if impaired  - Assess patient's need for assistive devices and provide as appropriate  - Encourage maximum independence but intervene and supervise when necessary  - Involve family in performance of ADLs  - Assess for home care needs following discharge   - Consider OT consult to assist with ADL evaluation and planning for discharge  - Provide patient education as appropriate  Outcome: Progressing  Goal: Maintains/Returns to pre admission functional level  Description: INTERVENTIONS:  - Perform BMAT or MOVE assessment daily    - Set and communicate daily mobility goal to care team and patient/family/caregiver  - Collaborate with rehabilitation services on mobility goals if consulted  - Perform Range of Motion 3 times a day  - Reposition patient every 3 hours    - Dangle patient 3 times a day  - Stand patient 3 times a day  - Ambulate patient 3 times a day  - Out of bed to chair 3 times a day   - Out of bed for meals 3 times a day  - Out of bed for toileting  - Record patient progress and toleration of activity level   Outcome: Progressing     Problem: DISCHARGE PLANNING  Goal: Discharge to home or other facility with appropriate resources  Description: INTERVENTIONS:  - Identify barriers to discharge w/patient and caregiver  - Arrange for needed discharge resources and transportation as appropriate  - Identify discharge learning needs (meds, wound care, etc )  - Arrange for interpretive services to assist at discharge as needed  - Refer to Case Management Department for coordinating discharge planning if the patient needs post-hospital services based on physician/advanced practitioner order or complex needs related to functional status, cognitive ability, or social support system  Outcome: Progressing     Problem: Prexisting or High Potential for Compromised Skin Integrity  Goal: Skin integrity is maintained or improved  Description: INTERVENTIONS:  - Identify patients at risk for skin breakdown  - Assess and monitor skin integrity  - Assess and monitor nutrition and hydration status  - Monitor labs   - Assess for incontinence   - Turn and reposition patient  - Assist with mobility/ambulation  - Relieve pressure over bony prominences  - Avoid friction and shearing  - Provide appropriate hygiene as needed including keeping skin clean and dry  - Evaluate need for skin moisturizer/barrier cream  - Collaborate with interdisciplinary team   - Patient/family teaching  - Consider wound care consult   Outcome: Progressing     Problem: Nutrition/Hydration-ADULT  Goal: Nutrient/Hydration intake appropriate for improving, restoring or maintaining nutritional needs  Description: Monitor and assess patient's nutrition/hydration status for malnutrition  Collaborate with interdisciplinary team and initiate plan and interventions as ordered  Monitor patient's weight and dietary intake as ordered or per policy  Utilize nutrition screening tool and intervene as necessary  Determine patient's food preferences and provide high-protein, high-caloric foods as appropriate       INTERVENTIONS:  - Monitor oral intake, urinary output, labs, and treatment plans  - Assess nutrition and hydration status and recommend course of action  - Evaluate amount of meals eaten  - Assist patient with eating if necessary   - Allow adequate time for meals  - Recommend/ encourage appropriate diets, oral nutritional supplements, and vitamin/mineral supplements  - Order, calculate, and assess calorie counts as needed  - Recommend, monitor, and adjust tube feedings and TPN/PPN based on assessed needs  - Assess need for intravenous fluids  - Provide specific nutrition/hydration education as appropriate  - Include patient/family/caregiver in decisions related to nutrition  Outcome: Progressing     Problem: MOBILITY - ADULT  Goal: Maintain or return to baseline ADL function  Description: INTERVENTIONS:  -  Assess patient's ability to carry out ADLs; assess patient's baseline for ADL function and identify physical deficits which impact ability to perform ADLs (bathing, care of mouth/teeth, toileting, grooming, dressing, etc )  - Assess/evaluate cause of self-care deficits   - Assess range of motion  - Assess patient's mobility; develop plan if impaired  - Assess patient's need for assistive devices and provide as appropriate  - Encourage maximum independence but intervene and supervise when necessary  - Involve family in performance of ADLs  - Assess for home care needs following discharge   - Consider OT consult to assist with ADL evaluation and planning for discharge  - Provide patient education as appropriate  Outcome: Progressing  Goal: Maintains/Returns to pre admission functional level  Description: INTERVENTIONS:  - Perform BMAT or MOVE assessment daily    - Set and communicate daily mobility goal to care team and patient/family/caregiver  - Collaborate with rehabilitation services on mobility goals if consulted  - Perform Range of Motion 3  times a day  - Reposition patient every 3 hours    - Dangle patient 3 times a day  - Stand patient 3 times a day  - Ambulate patient 3 times a day  - Out of bed to chair 3 times a day   - Out of bed for meals 3times a day  - Out of bed for toileting  - Record patient progress and toleration of activity level   Outcome: Progressing

## 2023-03-03 NOTE — PROGRESS NOTES
Pastoral Care Progress Note    3/3/2023  Patient: Amelia Rubio : 1965  Admission Date & Time: 2023 1458  MRN: [id number] [facility]: [id number]        Though Liz Posey seemed tired and not interested in a pastoral care visit, I was able to persuade him to tell me a story of how he got into theater  'I was in 3rd grade and was cast in the school play  I had a line that made the audience laugh    and I was hooked on being on the stage ever since '  He loosened up, got talkative, smiled, laughed as he told me the story  Columba Gonzalez will continue to try and encourage him

## 2023-03-03 NOTE — PROGRESS NOTES
PM&R PROGRESS NOTE:  Amelia Rubio 61 y o  male MRN: [id number]  Unit/Bed#: -35 Encounter: [id number]        Rehabilitation Diagnosis: Impairment of mobility, safety and Activities of Daily Living (ADLs) due to Debility:  16  Debility (Non-cardiac/Non-pulmonary)    HPI: Anup Robbins is a 61 y o  male with a history of hyperlipidemia, hypertension, GERD, ventral hernia, colon adenocarcinoma that initially presented to [facility] for an elective surgical procedure with Dr Aurea Davenport on 11/7/22  Patient presented to hem/onc on  9/22 when CT abdomen revealed transverse colonic apple core lesion and innumerable hepatic metastases  MRI revealed multiple hepati metastasis with smaller lesions in left lobe and larger lesions on the right lobe  On 11/7, patient underwent exploratory laparotomy, segment 3 liver resection, ablation, intraoperative ultrasound of the liver  This was complicated by left upper quadrant hematoma, imaging showed suspected leak from transverse colon anastomosis  On 11/16, patient underwent exploratory lap revealing hematoma, defect in transverse colon anastomosis with extravasation of succus and a dilated cecum requiring resection  He had a right hemicolectomy, left in discontinuity and temporary abdominal closure device  On 11/17, patient underwent re-exploratory, partial descending colectomy, primary colonic anastomosis created with diverting loop ileostomy and midline wound vac placement  An intra-abdominal abscess was positive for ESBL and patient completed prolonged course of antibiotics  He was discharged to SNF on 12/3 and returned to hospital on 12/4 for increased abdominal pain  Patient monitored off of antibiotics  On 12/6 patient was noted with an increased creatinine level, received IVF bolus with improvement  Patient developed tachycardia, increased abdominal pain, and incision with yellow drainage noted  CT AP showed abdominal abscess and distended gallbladder   ID was consulted, patient was continued on IV antibiotics through 12/15  Patient went to IR for percutaneous cholecystostomy tube insertion and hepatectomy abscess drainage  Nephrology consulted and initiated sodium bicarb gtt and IV albumin  On 12/14, patient midline/left chest wall pain, below abscess drain  Cardiac work-up completed  Troponin level 57 and CXR showed progressive bilateral opacities suspicious for CHF and small left pleural effusion  Cariology consulted with chest pain appearing musculoskeletal no further work up warranted  Patient was deemed medically stable and admitted to [address] [place] [address] on 12/14/22  He was discharged from [facility] on 01/17/23  Patient present to [facility] on 02/07 with increased weakness for two weeks with 2 falls noted  No injury noted  He was found to be septic with the main source of midline abdominal wound which was with foul smelling drainage  CT showed improvement in fluid collection at the left liver segment, perisplenic, left retroperitoneum, interior to the spleen  Patient was treated with broad spectrum antibiotics and eventually placed on Ertapenem  Hospital course complicated by fevers and leukocytosis  Repeat imaging concerning for cholecystitis  Patient underwent cystostomy tube exchange and Pattock drain was removed on 02/24  The patient was evaluated by the Rehabilitation team and deemed an appropriate candidate for an inpatient acute rehabilitation program  Patient was admitted on 02/24/23  SUBJECTIVE: Patient seen face to face  No acute issues overnight  Nausea with emesis s/p salt tablet this morning  Patient requested stopping salt tablets as they are not palatable, d/w patient purpose of medication and current serum Na+ 129  Zofran administered, currently no longer nauseated and feeling much better  No orthostasis this morning  Did not visualize abdominal wound today as dressings were already completed by nursing   Patient reports increase in intercostal soreness, contributing to PT exercises  Voiding  No chest pain, shortness of breath, fever, or chills  24 hr total  Cholecystostomy tube-  20 cc  Ileostomy- 800 cc     ASSESSMENT: Stable, progressing    PLAN:  - orthostatic hypotension- continue to monitor orthostatic blood pressures daily, Jesús stockings with therapies  - wound care: continue with daily dressing changes, wound following   - nausea: continue to monitor, zofran prn, monitor appetite  - continue current rehabilitation program with discharge on 03/10    Rehabilitation    • Functional deficits:  Mobility, self care  • Continue current rehabilitation plan of care to maximize function  • Functional update:   • PT: mobility- mod A, transfers, min - mod A, ambulation, mod A, 91'  • OT: ADL- bathing- min A, dressing- incidental touching, toileting- max A  • Estimated Discharge: anticipated 3/10 home with homecare    Pain  • Tylenol, oxycodone    DVT prophylaxis  • Lovenox    Bladder plan  • Continent    Bowel plan  • Ileostomy    Malignant neoplasm of transverse colon [facility])  Assessment & Plan  Complex history with chronological details below:    2/22: S/P diverting colostomy, liver biopsy +Chemo tx  11/7/22: Hepatic resection and colostomy reversal  11/16/22: Exploratory laparotomy with bowel resection and VAC placement   11/17/22: Right hemicolectomy, partial descending colon resection, colocolonic anastomosis with loop ileosotmy and midline VAC placement  12/20/22: Drains placed, total of 3 drains + perc radha tube + ileostomy  1/31/23: 2 of 3 drains removed  This hospital stay had fever/leukocytosis/sepsis, started Meropenem and then to Vancomycin and Ertapenem  Wound cx again = Ecoli ESBL  LD Ertapenem 2/14, Vancomycin 2/16 2/24: Abscessogram of midline drain showed minimal persistent collection and the midline catheter was removed   Perc Radha tube exchanged  • Patient does not wish to follow with [place] oncology anymore as it is too far of a drive; would like to establish with [facility] oncology (already follows with Dr Yisel Elmore)  • Follows with Dr Netta Mckenzie as outpt    * Sepsis [facility])  Assessment & Plan  Noted the development of foul-smelling drainage and increasing pain from his chronic abdominal wound with leukocytosis, tachypnea, tachycardia  • Purulent drainage from abdominal wound  • Hx of colon CA w/ extensive abd surgical- adenocarcinoma of transverse colon section  • Patient has several abdominal drains in place, with CT showing improvement in the fluid collections at the left liver lobe, perisplenic area, and the left retroperitoneum inferior to the spleen  • Completed Vancomycin and Ertapenem  • S/p bedside midline wound debridement + wet dressing 2/8 with general surgery  • S/p IR tube check 2/17/2023, drains kept in place  Discussed with IR, follow up in 1 month  • Given ongoing low grade fever and worsening leukocytosis, ordered repeat blood cultures and repeat CT a/p    CT a/p showing moderate R pleural effusion, imaging concerning for cholecystitis  Discussed case with surgery, IR  IR will reposition drain today- Alvina tube check showed retraction of the alvina tube   Interval occlusion of the cystic duct   Dino-enteric fistula persisted  Exchange of alvina tube 2/24  • Continue to monitor off of antibiotics, low threshold to re-consult ID, but stable at this time  • Debility from Sepsis in addition to 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  • Pain control, anxiety management  • PT/OT    Chronic bilateral pleural effusions  Assessment & Plan  Hx of pleural effusions with   Interval worsening of moderate right and small left pleural effusions  There is associated bibasilar atelectasis based on CTAP on 2/22    Monitor oxygen and breathing, may benefit from thoracentesis    Depression  Assessment & Plan  Continue Cymbalta  Provide supportive counseling and encouragement, easily tearful  Neuropsychology consultation for support    Cholecystitis, unspecified  Assessment & Plan  Subacute at this point, but still requiring per radha tube  • s/p percutaneous tube placement 12/8/22 asnd recent exchange on 2/24/23  • CT scan was concerning for cholecystitis --> to IR 2/24 = showed retraction of the radha tube and interval occlusion of the cystic duct   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  Radha tube was exchanged  • Monitor output and pain    Abdominal wound dehiscence  Assessment & Plan  Midline abdominal wound with initially foul smelling drainage  Now improved per patient with red granular base and slough noted  Santyl and wet to dry dressing was done in acute care  Consult wound care   Daily dressing changes    Nephrolithiasis  Assessment & Plan  RUQ US revealed 7 mm nonobstructing right intrarenal calculus  No hydronephrosis noted on CT a/p  Asymptomatic at this time  • Flomax  • Monitor outpt, symptoms    Hyponatremia  Assessment & Plan  • Mild, most recently 129 (previous 131)   • Felt likely2/2 SIADH in setting of malignancy  • Sodium chloride tab 1 BID  • Monitor BMP    Elevated alkaline phosphatase level  Assessment & Plan  Chronically elevated likely in setting of known hepatic metastasis  • RUQ US Decompressed gallbladder around a cholecystostomy tube, limiting evaluation  Hepatomegaly  Heterogeneous echotexture of the liver in this patient with known metastatic disease  7 mm nonobstructing right intrarenal calculus  • Monitor with am CMP    Leukocytosis  Assessment & Plan  WBC 13 23 (previous 13 07, 13 41)  Mildly above normal, trendiong down, however has a history of spiking and dropping  Monitor labs however need to follow clinical exam and vitals as it may spike and drop again  Monitor off antibiotics  ID following  If Temp >101 blood cultures and consider CT A/P    Abscess  Assessment & Plan  Smaller abscesses on recent studies now with no active drains  At risk for further complications    Lower theshold for abdominal CT if develops fevers or leukocytosis    Acute on chronic kidney failure [facility])  Assessment & Plan  Lab Results   Component Value Date    EGFR 56 02/24/2023    EGFR 52 02/23/2023    EGFR 52 02/22/2023    CREATININE 1 36 (H) 02/24/2023    CREATININE 1 45 (H) 02/23/2023    CREATININE 1 45 (H) 02/22/2023     • POA with creatinine 2 52; baseline 1-1 3  • Suspecting possible multifactorial cause in setting of dehydration and sepsis  • Improving back into baseline levels, Continue to monitor on labs, minimize any relative hypotension  Avoid nephrotoxic agents  Anemia  Assessment & Plan  Hbg 9 4 (previous 8 3)  Required transfusion on 2/16  Order type and screen, 1 unit pRBC (2/26)  Monitor CBC    Mixed hyperlipidemia  Assessment & Plan  Continue statin    Benign essential hypertension  Assessment & Plan  Monitor pressures in therapy    Type 2 diabetes mellitus without complication, with long-term current use of insulin [facility])  Assessment & Plan  Lab Results   Component Value Date    HGBA1C 8 0 (H) 09/26/2022       Recent Labs     02/24/23  0839 02/24/23  1137 02/24/23  1626 02/24/23  2040   POCGLU 106 105 152* 120     • Home:  Lantus 8U qam/Lispro 4U TID  • Here:  Lantus 5U qhs/Lispro 4U TID  • Has a DEXCOM meter and a regular meter at home  • Continue DM diet and QID Accuchecks/SSI    Appreciate IM consultants medical co-management  Labs, medications, and imaging reviewed  ROS:  Review of Systems   A 10 point review of systems was negative except for what is noted in the HPI  OBJECTIVE:   /84 (BP Location: Right leg)   Pulse 100   Temp 98 4 °F (36 9 °C)   Resp 16   Ht 5' 9" (1 753 m)   Wt 80 1 kg (176 lb 9 4 oz)   SpO2 95%   BMI 26 08 kg/m²     Physical Exam  Constitutional:       Appearance: Normal appearance  HENT:      Head: Normocephalic and atraumatic        Mouth/Throat:      Mouth: Mucous membranes are moist    Cardiovascular:      Rate and Rhythm: Normal rate and regular rhythm  Pulses: Normal pulses  Heart sounds: Normal heart sounds  Pulmonary:      Effort: Pulmonary effort is normal       Breath sounds: Normal breath sounds  Abdominal:      General: Bowel sounds are normal       Palpations: Abdomen is soft  Tenderness: There is abdominal tenderness  Comments: + ileostomy, + cholecystostomy tube   Musculoskeletal:         General: Normal range of motion  Cervical back: Normal range of motion  Skin:     General: Skin is warm and dry  Capillary Refill: Capillary refill takes less than 2 seconds  Neurological:      Mental Status: He is alert and oriented to person, place, and time  Motor: Weakness present     Psychiatric:         Mood and Affect: Mood normal          Judgment: Judgment normal         Lab Results   Component Value Date    WBC 13 23 (H) 03/03/2023    HGB 9 4 (L) 03/03/2023    HCT 30 2 (L) 03/03/2023    MCV 83 03/03/2023     03/03/2023     Lab Results   Component Value Date    SODIUM 129 (L) 03/03/2023    K 3 8 03/03/2023     03/03/2023    CO2 19 (L) 03/03/2023    BUN 16 03/03/2023    CREATININE 1 08 03/03/2023    GLUC 131 03/03/2023    CALCIUM 8 5 03/03/2023     Lab Results   Component Value Date    INR 1 32 (H) 02/07/2023    INR 1 12 12/19/2022    INR 1 10 11/12/2022    PROTIME 16 1 (H) 02/07/2023    PROTIME 14 7 (H) 12/19/2022    PROTIME 14 4 11/12/2022       Current Facility-Administered Medications:   •  acetaminophen (TYLENOL) tablet 650 mg, 650 mg, Oral, Q4H PRN, Kimberly Arias DO, 650 mg at 03/01/23 2158  •  aspirin chewable tablet 81 mg, 81 mg, Oral, Daily, Kimberly Arias, DO, 81 mg at 03/03/23 4538  •  atorvastatin (LIPITOR) tablet 40 mg, 40 mg, Oral, Daily, Kimberly Arias DO, 40 mg at 03/03/23 1543  •  calcium carbonate (TUMS) chewable tablet 500 mg, 500 mg, Oral, TID PRN, GAURANG Keith, 500 mg at 03/01/23 1715  •  collagenase (SANTYL) ointment, , Topical, Daily, Kimberly Arias DO, Given at 03/03/23 6672  •  DULoxetine (CYMBALTA) delayed release capsule 30 mg, 30 mg, Oral, Daily, Estevan Maldonado DO, 30 mg at 03/03/23 0804  •  enoxaparin (LOVENOX) subcutaneous injection 40 mg, 40 mg, Subcutaneous, Q24H SHANNON, GAURANG Keith, 40 mg at 03/03/23 4170  •  hydrOXYzine HCL (ATARAX) tablet 10 mg, 10 mg, Oral, HS, Estevan Maldonado DO, 10 mg at 03/02/23 2204  •  insulin glargine (LANTUS) subcutaneous injection 5 Units 0 05 mL, 5 Units, Subcutaneous, HS, Estevan Maldonado DO, 5 Units at 03/02/23 2205  •  insulin lispro (HumaLOG) 100 units/mL subcutaneous injection 1-6 Units, 1-6 Units, Subcutaneous, TID AC, 2 Units at 03/03/23 1101 **AND** Fingerstick Glucose (POCT), , , 4x Daily AC and at bedtime, Estevan Maldonado DO  •  insulin lispro (HumaLOG) 100 units/mL subcutaneous injection 1-6 Units, 1-6 Units, Subcutaneous, HS, Estevan Maldonado DO, 1 Units at 02/28/23 2238  •  insulin lispro (HumaLOG) 100 units/mL subcutaneous injection 4 Units, 4 Units, Subcutaneous, TID With Meals, Estevan Maldonado DO, 4 Units at 03/03/23 1101  •  melatonin tablet 6 mg, 6 mg, Oral, HS PRN, Estevan Maldonado DO, 6 mg at 02/28/23 2238  •  methocarbamol (ROBAXIN) tablet 500 mg, 500 mg, Oral, BID PRN, Estevan Maldonado DO, 500 mg at 03/03/23 8656  •  midodrine (PROAMATINE) tablet 2 5 mg, 2 5 mg, Oral, BID AC, GAURANG Diggs, 2 5 mg at 03/02/23 1135  •  ondansetron [facility]) injection 4 mg, 4 mg, Intravenous, Q4H PRN, Estevan Maldonado DO, 4 mg at 03/03/23 0937  •  oxyCODONE (ROXICODONE) IR tablet 2 5 mg, 2 5 mg, Oral, Q4H PRN, Estevan Maldonado DO  •  oxyCODONE (ROXICODONE) IR tablet 5 mg, 5 mg, Oral, Q4H PRN, Estevan Maldonado DO, 5 mg at 03/02/23 2203  •  pantoprazole (PROTONIX) EC tablet 40 mg, 40 mg, Oral, BID AC, Estevan Maldonado DO, 40 mg at 03/03/23 3928  •  simethicone (MYLICON) chewable tablet 80 mg, 80 mg, Oral, 4x Daily (with meals and at bedtime), Esetvan Maldonado DO, 80 mg at 03/03/23 1100  •  sodium chloride tablet 2 g, 2 g, Oral, BID With Meals, GAURANG Ritter  •  tamsulosin (FLOMAX) capsule 0 4 mg, 0 4 mg, Oral, Daily With Tong Adas, DO, 0 4 mg at 03/02/23 1758    Past Medical History:   Diagnosis Date   • Abdominal pain 03/12/2022   • Acute renal failure ([place] [address] )     [id number] resolved   • Acute respiratory failure with hypoxia ([place] [address] ) 11/12/2022   • Bacteremia 11/12/2022   • Cancer ([place] [address] )    • Diabetes mellitus ([place] [address] )    • Enteritis 08/23/2016   • Flash pulmonary edema ([place] [address] ) 11/12/2022   • Gastroparesis due to DM ([place] [address] ) 08/23/2016   • GERD (gastroesophageal reflux disease)    • Hernia, ventral 08/04/2016   • Hyperlipidemia    • Hypertension    • Morbid obesity ([place] [address] ) 04/17/2018   • Postoperative visit 03/02/2022   • SIRS (systemic inflammatory response syndrome) ([place] [address] ) 03/12/2022   • Snoring    • Stage 3a chronic kidney disease ([place] [address] ) 02/19/2022       Patient Active Problem List    Diagnosis Date Noted   • Malignant neoplasm of transverse colon ([place] [address] ) 03/01/2022   • Sepsis ([place] [address] ) 12/17/2022   • 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 liver ([place] [address] ) 03/02/2022   • Abdominal wound dehiscence 02/24/2023   • Cholecystitis, unspecified 02/24/2023   • Depression 02/24/2023   • Chronic bilateral pleural effusions 02/24/2023   • Nephrolithiasis 02/09/2023   • Abnormal CT scan 02/07/2023   • Hyponatremia 02/07/2023   • Dehiscence of incision 12/30/2022   • Elevated alkaline phosphatase level 12/30/2022   • Leukocytosis 12/29/2022   • Abscess 12/27/2022   • Acute pain 12/27/2022   • Severe protein-calorie malnutrition ([place] [address] ) 12/14/2022   • Acute on chronic kidney failure ([place] [address] ) 12/14/2022   • MR (mitral regurgitation) 11/12/2022   • ESBL (extended spectrum beta-lactamase) producing bacteria infection 11/12/2022   • Encephalopathy 11/09/2022   • Cervical radiculopathy 10/26/2022   • Other fatigue 06/15/2022   • Colostomy prolapse ([facility] ) 05/27/2022   • Colon cancer metastasized to liver ([facility] ) 03/12/2022   • Iron deficiency anemia, unspecified 03/01/2022   • Thrombocytosis 02/21/2022   • Hypokalemia 02/19/2022   • Transaminitis 02/19/2022   • Type 2 diabetes mellitus with hyperlipidemia ([facility] ) 02/18/2022   • Anemia 02/18/2022   • Left ureteral calculus 01/30/2020   • Incarcerated umbilical hernia 02/29/3962   • Testicular hypogonadism 06/19/2017   • Low testosterone 05/30/2017   • Type 2 diabetes mellitus without complication, with long-term current use of insulin ([facility] ) 08/23/2016   • Benign essential hypertension 08/23/2016   • Mixed hyperlipidemia 08/23/2016   • Erectile dysfunction 07/11/2016   • Obesity (BMI 30-39 9) 07/11/2016        GAURANG Burton  Physical Medicine and Chris

## 2023-03-03 NOTE — PLAN OF CARE
Reviewed    Problem: PAIN - ADULT  Goal: Verbalizes/displays adequate comfort level or baseline comfort level  Description: Interventions:  - Encourage patient to monitor pain and request assistance  - Assess pain using appropriate pain scale  - Administer analgesics based on type and severity of pain and evaluate response  - Implement non-pharmacological measures as appropriate and evaluate response  - Consider cultural and social influences on pain and pain management  - Notify physician/advanced practitioner if interventions unsuccessful or patient reports new pain  Outcome: Progressing     Problem: INFECTION - ADULT  Goal: Absence or prevention of progression during hospitalization  Description: INTERVENTIONS:  - Assess and monitor for signs and symptoms of infection  - Monitor lab/diagnostic results  - Monitor all insertion sites, i e  indwelling lines, tubes, and drains  - Monitor endotracheal if appropriate and nasal secretions for changes in amount and color  - [place] appropriate cooling/warming therapies per order  - Administer medications as ordered  - Instruct and encourage patient and family to use good hand hygiene technique  - Identify and instruct in appropriate isolation precautions for identified infection/condition  Outcome: Progressing  Goal: Absence of fever/infection during neutropenic period  Description: INTERVENTIONS:  - Monitor WBC    Outcome: Progressing     Problem: SAFETY ADULT  Goal: Patient will remain free of falls  Description: INTERVENTIONS:  - Educate patient/family on patient safety including physical limitations  - Instruct patient to call for assistance with activity   - Consult OT/PT to assist with strengthening/mobility   - Keep Call bell within reach  - Keep bed low and locked with side rails adjusted as appropriate  - Keep care items and personal belongings within reach  - Initiate and maintain comfort rounds  - Make Fall Risk Sign visible to staff  - Offer Toileting every 4 Hours, in advance of need  - Apply yellow socks and bracelet for high fall risk patients  - Consider moving patient to room near nurses station  Outcome: Progressing  Goal: Maintain or return to baseline ADL function  Description: INTERVENTIONS:  -  Assess patient's ability to carry out ADLs; assess patient's baseline for ADL function and identify physical deficits which impact ability to perform ADLs (bathing, care of mouth/teeth, toileting, grooming, dressing, etc )  - Assess/evaluate cause of self-care deficits   - Assess range of motion  - Assess patient's mobility; develop plan if impaired  - Assess patient's need for assistive devices and provide as appropriate  - Encourage maximum independence but intervene and supervise when necessary  - Involve family in performance of ADLs  - Assess for home care needs following discharge   - Consider OT consult to assist with ADL evaluation and planning for discharge  - Provide patient education as appropriate  Outcome: Progressing  Goal: Maintains/Returns to pre admission functional level  Description: INTERVENTIONS:  - Set and communicate daily mobility goal to care team and patient/family/caregiver     - Collaborate with rehabilitation services on mobility goals if consulted  - Out of bed for toileting  - Record patient progress and toleration of activity level   Outcome: Progressing     Problem: DISCHARGE PLANNING  Goal: Discharge to home or other facility with appropriate resources  Description: INTERVENTIONS:  - Identify barriers to discharge w/patient and caregiver  - Arrange for needed discharge resources and transportation as appropriate  - Identify discharge learning needs (meds, wound care, etc )  - Arrange for interpretive services to assist at discharge as needed  - Refer to Case Management Department for coordinating discharge planning if the patient needs post-hospital services based on physician/advanced practitioner order or complex needs related to functional status, cognitive ability, or social support system  Outcome: Progressing     Problem: Prexisting or High Potential for Compromised Skin Integrity  Goal: Skin integrity is maintained or improved  Description: INTERVENTIONS:  - Identify patients at risk for skin breakdown  - Assess and monitor skin integrity  - Assess and monitor nutrition and hydration status  - Monitor labs   - Assess for incontinence   - Turn and reposition patient  - Assist with mobility/ambulation  - Relieve pressure over bony prominences  - Avoid friction and shearing  - Provide appropriate hygiene as needed including keeping skin clean and dry  - Evaluate need for skin moisturizer/barrier cream  - Collaborate with interdisciplinary team   - Patient/family teaching  - Consider wound care consult   Outcome: Progressing     Problem: Nutrition/Hydration-ADULT  Goal: Nutrient/Hydration intake appropriate for improving, restoring or maintaining nutritional needs  Description: Monitor and assess patient's nutrition/hydration status for malnutrition  Collaborate with interdisciplinary team and initiate plan and interventions as ordered  Monitor patient's weight and dietary intake as ordered or per policy  Utilize nutrition screening tool and intervene as necessary  Determine patient's food preferences and provide high-protein, high-caloric foods as appropriate       INTERVENTIONS:  - Monitor oral intake, urinary output, labs, and treatment plans  - Assess nutrition and hydration status and recommend course of action  - Evaluate amount of meals eaten  - Assist patient with eating if necessary   - Allow adequate time for meals  - Recommend/ encourage appropriate diets, oral nutritional supplements, and vitamin/mineral supplements  - Order, calculate, and assess calorie counts as needed  - Recommend, monitor, and adjust tube feedings and TPN/PPN based on assessed needs  - Assess need for intravenous fluids  - Provide specific nutrition/hydration education as appropriate  - Include patient/family/caregiver in decisions related to nutrition  Outcome: Progressing     Problem: MOBILITY - ADULT  Goal: Maintain or return to baseline ADL function  Description: INTERVENTIONS:  -  Assess patient's ability to carry out ADLs; assess patient's baseline for ADL function and identify physical deficits which impact ability to perform ADLs (bathing, care of mouth/teeth, toileting, grooming, dressing, etc )  - Assess/evaluate cause of self-care deficits   - Assess range of motion  - Assess patient's mobility; develop plan if impaired  - Assess patient's need for assistive devices and provide as appropriate  - Encourage maximum independence but intervene and supervise when necessary  - Involve family in performance of ADLs  - Assess for home care needs following discharge   - Consider OT consult to assist with ADL evaluation and planning for discharge  - Provide patient education as appropriate  Outcome: Progressing  Goal: Maintains/Returns to pre admission functional level  Description: INTERVENTIONS:  - Set and communicate daily mobility goal to care team and patient/family/caregiver     - Collaborate with rehabilitation services on mobility goals if consulted  - Out of bed for toileting  - Record patient progress and toleration of activity level   Outcome: Progressing

## 2023-03-04 LAB
GLUCOSE SERPL-MCNC: 114 MG/DL (ref 65–140)
GLUCOSE SERPL-MCNC: 118 MG/DL (ref 65–140)
GLUCOSE SERPL-MCNC: 136 MG/DL (ref 65–140)
GLUCOSE SERPL-MCNC: 196 MG/DL (ref 65–140)

## 2023-03-04 RX ORDER — INSULIN LISPRO 100 [IU]/ML
5 INJECTION, SOLUTION INTRAVENOUS; SUBCUTANEOUS
Status: DISCONTINUED | OUTPATIENT
Start: 2023-03-04 | End: 2023-03-11

## 2023-03-04 RX ADMIN — ENOXAPARIN SODIUM 40 MG: 40 INJECTION SUBCUTANEOUS at 09:23

## 2023-03-04 RX ADMIN — COLLAGENASE SANTYL: 250 OINTMENT TOPICAL at 17:18

## 2023-03-04 RX ADMIN — ATORVASTATIN CALCIUM 40 MG: 40 TABLET, FILM COATED ORAL at 09:24

## 2023-03-04 RX ADMIN — INSULIN LISPRO 5 UNITS: 100 INJECTION, SOLUTION INTRAVENOUS; SUBCUTANEOUS at 18:09

## 2023-03-04 RX ADMIN — ASPIRIN 81 MG CHEWABLE TABLET 81 MG: 81 TABLET CHEWABLE at 09:23

## 2023-03-04 RX ADMIN — PANTOPRAZOLE SODIUM 40 MG: 40 TABLET, DELAYED RELEASE ORAL at 06:00

## 2023-03-04 RX ADMIN — PANTOPRAZOLE SODIUM 40 MG: 40 TABLET, DELAYED RELEASE ORAL at 16:48

## 2023-03-04 RX ADMIN — OXYCODONE HYDROCHLORIDE 5 MG: 5 TABLET ORAL at 09:23

## 2023-03-04 RX ADMIN — HYDROXYZINE HYDROCHLORIDE 10 MG: 10 TABLET ORAL at 21:29

## 2023-03-04 RX ADMIN — CARBIDOPA AND LEVODOPA 2.5 MG: 50; 200 TABLET, EXTENDED RELEASE ORAL at 05:50

## 2023-03-04 RX ADMIN — CARBIDOPA AND LEVODOPA 2.5 MG: 50; 200 TABLET, EXTENDED RELEASE ORAL at 11:43

## 2023-03-04 RX ADMIN — SIMETHICONE 80 MG: 80 TABLET, CHEWABLE ORAL at 11:43

## 2023-03-04 RX ADMIN — SODIUM CHLORIDE 2 G: 1 TABLET ORAL at 17:19

## 2023-03-04 RX ADMIN — INSULIN GLARGINE 5 UNITS: 100 INJECTION, SOLUTION SUBCUTANEOUS at 21:29

## 2023-03-04 RX ADMIN — DULOXETINE HYDROCHLORIDE 30 MG: 30 CAPSULE, DELAYED RELEASE ORAL at 09:24

## 2023-03-04 RX ADMIN — SIMETHICONE 80 MG: 80 TABLET, CHEWABLE ORAL at 21:29

## 2023-03-04 RX ADMIN — TAMSULOSIN HYDROCHLORIDE 0.4 MG: 0.4 CAPSULE ORAL at 17:28

## 2023-03-04 RX ADMIN — SIMETHICONE 80 MG: 80 TABLET, CHEWABLE ORAL at 09:17

## 2023-03-04 RX ADMIN — INSULIN LISPRO 4 UNITS: 100 INJECTION, SOLUTION INTRAVENOUS; SUBCUTANEOUS at 11:45

## 2023-03-04 RX ADMIN — OXYCODONE HYDROCHLORIDE 5 MG: 5 TABLET ORAL at 21:29

## 2023-03-04 RX ADMIN — ONDANSETRON 4 MG: 2 INJECTION INTRAMUSCULAR; INTRAVENOUS at 09:30

## 2023-03-04 RX ADMIN — INSULIN LISPRO 2 UNITS: 100 INJECTION, SOLUTION INTRAVENOUS; SUBCUTANEOUS at 11:44

## 2023-03-04 RX ADMIN — SIMETHICONE 80 MG: 80 TABLET, CHEWABLE ORAL at 17:19

## 2023-03-04 NOTE — PROGRESS NOTES
Rehabilitation Diagnosis:  Impairment of mobility, safety and Activities of Daily Living (ADLs) due to Debility:  16  Debility (Non-cardiac/Non-pulmonary)    ASSESSMENT: Stable    PLAN:    Rehabilitation  • Continue current rehabilitation plan of care to maximize function  • No funcitonal barriers identified    Medical issues  • No acute concerns  • Continue current medical plan of care  Appreciate IM consultants co-management  • Continue with current rehabilitation plan of care  • CBC, BMP 03/06      SUBJECTIVE: Patient seen face to face  No acute issues overnight  Slept well on Atarax 10 mg, no longer tired during day  Abdominal intercostal discomfort improving  Good appetite, no nausea  Progressing as expected in rehabilitation program  Denies chest pain, shortness of breath, nausea, fever, chills, abdominal pain  24 hr output  Ileostomy: 500 cc  Cholecystostomy drain: 50 cc      ROS:  A ten point review of systems was completed on 03/04/23 and pertinent positives are listed in subjective section  All other systems reviewed were negative  OBJECTIVE:   /75 (BP Location: Left arm)   Pulse 88   Temp 98 2 °F (36 8 °C) (Oral)   Resp 20   Ht 5' 9" (1 753 m)   Wt 80 1 kg (176 lb 9 4 oz)   SpO2 95%   BMI 26 08 kg/m²     Physical Exam  Constitutional:       Appearance: Normal appearance  HENT:      Head: Normocephalic and atraumatic  Mouth/Throat:      Mouth: Mucous membranes are moist    Cardiovascular:      Rate and Rhythm: Normal rate and regular rhythm  Pulses: Normal pulses  Heart sounds: Normal heart sounds  Pulmonary:      Effort: Pulmonary effort is normal       Breath sounds: Normal breath sounds  Abdominal:      General: Bowel sounds are normal       Palpations: Abdomen is soft  Comments: +Ileostomy, cholecystostomy tube   Musculoskeletal:         General: Tenderness present  Normal range of motion  Cervical back: Normal range of motion     Skin: General: Skin is warm and dry  Capillary Refill: Capillary refill takes less than 2 seconds  Comments: +abdominal midline wound   Neurological:      Mental Status: He is alert and oriented to person, place, and time     Psychiatric:         Mood and Affect: Mood normal          Judgment: Judgment normal         Personally reviewed on 03/04/23:   Lab Results   Component Value Date    WBC 13 23 (H) 03/03/2023    HGB 9 4 (L) 03/03/2023    HCT 30 2 (L) 03/03/2023    MCV 83 03/03/2023     03/03/2023     Lab Results   Component Value Date    SODIUM 129 (L) 03/03/2023    K 3 8 03/03/2023     03/03/2023    CO2 19 (L) 03/03/2023    BUN 16 03/03/2023    CREATININE 1 08 03/03/2023    GLUC 131 03/03/2023    CALCIUM 8 5 03/03/2023     Lab Results   Component Value Date    INR 1 32 (H) 02/07/2023    INR 1 12 12/19/2022    INR 1 10 11/12/2022    PROTIME 16 1 (H) 02/07/2023    PROTIME 14 7 (H) 12/19/2022    PROTIME 14 4 11/12/2022           Current Facility-Administered Medications:   •  acetaminophen (TYLENOL) tablet 650 mg, 650 mg, Oral, Q4H PRN, Mari Nazario DO, 650 mg at 03/01/23 2158  •  aspirin chewable tablet 81 mg, 81 mg, Oral, Daily, Mari Nazario DO, 81 mg at 03/04/23 6802  •  atorvastatin (LIPITOR) tablet 40 mg, 40 mg, Oral, Daily, Mari Nazario DO, 40 mg at 03/04/23 0262  •  calcium carbonate (TUMS) chewable tablet 500 mg, 500 mg, Oral, TID PRN, GAURANG Keith, 500 mg at 03/01/23 1715  •  collagenase (SANTYL) ointment, , Topical, Daily, Mari Nazario DO, Given at 03/03/23 0804  •  DULoxetine (CYMBALTA) delayed release capsule 30 mg, 30 mg, Oral, Daily, Mari Nazario DO, 30 mg at 03/04/23 0875  •  enoxaparin (LOVENOX) subcutaneous injection 40 mg, 40 mg, Subcutaneous, Q24H Jenelle ORTEGA CRNP, 40 mg at 03/04/23 8784  •  hydrOXYzine HCL (ATARAX) tablet 10 mg, 10 mg, Oral, HS, Mari Nazario DO, 10 mg at 03/03/23 2232  •  insulin glargine (LANTUS) subcutaneous injection 5 Units 0 05 mL, 5 Units, Subcutaneous, HS, Earvin Mayte, DO, 5 Units at 03/03/23 2231  •  insulin lispro (HumaLOG) 100 units/mL subcutaneous injection 1-6 Units, 1-6 Units, Subcutaneous, TID AC, 2 Units at 03/04/23 1144 **AND** Fingerstick Glucose (POCT), , , 4x Daily AC and at bedtime, Earvin [place], DO  •  insulin lispro (HumaLOG) 100 units/mL subcutaneous injection 1-6 Units, 1-6 Units, Subcutaneous, HS, Earvin [place], DO, 1 Units at 02/28/23 2238  •  insulin lispro (HumaLOG) 100 units/mL subcutaneous injection 5 Units, 5 Units, Subcutaneous, TID With Meals, GAURANG Winston  •  melatonin tablet 6 mg, 6 mg, Oral, HS PRN, Earvin Mayte, DO, 6 mg at 02/28/23 2238  •  methocarbamol (ROBAXIN) tablet 500 mg, 500 mg, Oral, BID PRN, Earvin [place], DO, 500 mg at 03/03/23 7510  •  midodrine (PROAMATINE) tablet 2 5 mg, 2 5 mg, Oral, BID AC, Elvan GAURANG Herrera, 2 5 mg at 03/04/23 1143  •  ondansetron (ZOFRAN) injection 4 mg, 4 mg, Intravenous, Q4H PRN, Earvin Mayte, DO, 4 mg at 03/04/23 0930  •  oxyCODONE (ROXICODONE) IR tablet 2 5 mg, 2 5 mg, Oral, Q4H PRN, Earvin [place], DO  •  oxyCODONE (ROXICODONE) IR tablet 5 mg, 5 mg, Oral, Q4H PRN, Earvin [place], DO, 5 mg at 03/04/23 3961  •  pantoprazole (PROTONIX) EC tablet 40 mg, 40 mg, Oral, BID AC, Earvin [place], DO, 40 mg at 03/04/23 0600  •  simethicone (MYLICON) chewable tablet 80 mg, 80 mg, Oral, 4x Daily (with meals and at bedtime), Earvin [place], DO, 80 mg at 03/04/23 1143  •  sodium chloride tablet 2 g, 2 g, Oral, BID With Meals, Elvan Handler GAURANG Arreola, 2 g at 03/03/23 1735  •  tamsulosin (FLOMAX) capsule 0 4 mg, 0 4 mg, Oral, Daily With Danuta Jenkins DO, 0 4 mg at 03/03/23 1734    Past Medical History:   Diagnosis Date   • Abdominal pain 03/12/2022   • [facility] )     [id number] resolved   • Acute respiratory failure with hypoxia ([facility] ) 11/12/2022   • Bacteremia 11/12/2022   • Cancer ([facility] )    • Diabetes mellitus ([facility] )    • Enteritis 08/23/2016   • Flash pulmonary edema ([place] [address] ) 11/12/2022   • Gastroparesis due to DM ([place] [address] ) 08/23/2016   • GERD (gastroesophageal reflux disease)    • Hernia, ventral 08/04/2016   • Hyperlipidemia    • Hypertension    • Morbid obesity ([place] [address] ) 04/17/2018   • Postoperative visit 03/02/2022   • SIRS (systemic inflammatory response syndrome) ([place] [address] ) 03/12/2022   • Snoring    • Stage 3a chronic kidney disease ([place] [address] ) 02/19/2022       Patient Active Problem List    Diagnosis Date Noted   • Malignant neoplasm of transverse colon ([place] [address] ) 03/01/2022   • Sepsis ([place] [address] ) 12/17/2022   • 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 liver ([place] [address] ) 03/02/2022   • Abdominal wound dehiscence 02/24/2023   • Cholecystitis, unspecified 02/24/2023   • Depression 02/24/2023   • Chronic bilateral pleural effusions 02/24/2023   • Nephrolithiasis 02/09/2023   • Abnormal CT scan 02/07/2023   • Hyponatremia 02/07/2023   • Dehiscence of incision 12/30/2022   • Elevated alkaline phosphatase level 12/30/2022   • Leukocytosis 12/29/2022   • Abscess 12/27/2022   • Acute pain 12/27/2022   • Severe protein-calorie malnutrition ([place] [address] ) 12/14/2022   • Acute on chronic kidney failure ([place] [address] ) 12/14/2022   • MR (mitral regurgitation) 11/12/2022   • ESBL (extended spectrum beta-lactamase) producing bacteria infection 11/12/2022   • Encephalopathy 11/09/2022   • Cervical radiculopathy 10/26/2022   • Other fatigue 06/15/2022   • Colostomy prolapse ([place] [address] ) 05/27/2022   • Colon cancer metastasized to liver ([place] [address] ) 03/12/2022   • Iron deficiency anemia, unspecified 03/01/2022   • Thrombocytosis 02/21/2022   • Hypokalemia 02/19/2022   • Transaminitis 02/19/2022   • Type 2 diabetes mellitus with hyperlipidemia ([place] [address] ) 02/18/2022   • Anemia 02/18/2022   • Left ureteral calculus 01/30/2020   • Incarcerated umbilical hernia 99/12/6170   • Testicular hypogonadism 06/19/2017   • Low testosterone 05/30/2017   • Type 2 diabetes mellitus without complication, with long-term current use of insulin ([facility] ) 08/23/2016   • Benign essential hypertension 08/23/2016   • Mixed hyperlipidemia 08/23/2016   • Erectile dysfunction 07/11/2016   • Obesity (BMI 30-39 9) 07/11/2016      Caroleen Alpers, CRNP  [address]

## 2023-03-04 NOTE — PLAN OF CARE
Problem: PAIN - ADULT  Goal: Verbalizes/displays adequate comfort level or baseline comfort level  Description: Interventions:  - Encourage patient to monitor pain and request assistance  - Assess pain using appropriate pain scale  - Administer analgesics based on type and severity of pain and evaluate response  - Implement non-pharmacological measures as appropriate and evaluate response  - Consider cultural and social influences on pain and pain management  - Notify physician/advanced practitioner if interventions unsuccessful or patient reports new pain  Outcome: Progressing     Problem: INFECTION - ADULT  Goal: Absence or prevention of progression during hospitalization  Description: INTERVENTIONS:  - Assess and monitor for signs and symptoms of infection  - Monitor lab/diagnostic results  - Monitor all insertion sites, i e  indwelling lines, tubes, and drains  - Monitor endotracheal if appropriate and nasal secretions for changes in amount and color  - [place] appropriate cooling/warming therapies per order  - Administer medications as ordered  - Instruct and encourage patient and family to use good hand hygiene technique  - Identify and instruct in appropriate isolation precautions for identified infection/condition  Outcome: Progressing  Goal: Absence of fever/infection during neutropenic period  Description: INTERVENTIONS:  - Monitor WBC    Outcome: Progressing     Problem: SAFETY ADULT  Goal: Patient will remain free of falls  Description: INTERVENTIONS:  - Educate patient/family on patient safety including physical limitations  - Instruct patient to call for assistance with activity   - Consult OT/PT to assist with strengthening/mobility   - Keep Call bell within reach  - Keep bed low and locked with side rails adjusted as appropriate  - Keep care items and personal belongings within reach  - Initiate and maintain comfort rounds  - Make Fall Risk Sign visible to staff  - Offer Toileting every 2 Hours, in advance of need  - Initiate/Maintain alarm  - Obtain necessary fall risk management equipment:   - Apply yellow socks and bracelet for high fall risk patients  - Consider moving patient to room near nurses station  Outcome: Progressing  Goal: Maintain or return to baseline ADL function  Description: INTERVENTIONS:  -  Assess patient's ability to carry out ADLs; assess patient's baseline for ADL function and identify physical deficits which impact ability to perform ADLs (bathing, care of mouth/teeth, toileting, grooming, dressing, etc )  - Assess/evaluate cause of self-care deficits   - Assess range of motion  - Assess patient's mobility; develop plan if impaired  - Assess patient's need for assistive devices and provide as appropriate  - Encourage maximum independence but intervene and supervise when necessary  - Involve family in performance of ADLs  - Assess for home care needs following discharge   - Consider OT consult to assist with ADL evaluation and planning for discharge  - Provide patient education as appropriate  Outcome: Progressing  Goal: Maintains/Returns to pre admission functional level  Description: INTERVENTIONS:  - Perform BMAT or MOVE assessment daily    - Set and communicate daily mobility goal to care team and patient/family/caregiver  - Collaborate with rehabilitation services on mobility goals if consulted  - Perform Range of Motion 3 times a day  - Reposition patient every 2 hours    - Dangle patient 3 times a day  - Stand patient 3 times a day  - Ambulate patient 3 times a day  - Out of bed to chair 3 times a day   - Out of bed for meals 3 times a day  - Out of bed for toileting  - Record patient progress and toleration of activity level   Outcome: Progressing     Problem: DISCHARGE PLANNING  Goal: Discharge to home or other facility with appropriate resources  Description: INTERVENTIONS:  - Identify barriers to discharge w/patient and caregiver  - Arrange for needed discharge resources and transportation as appropriate  - Identify discharge learning needs (meds, wound care, etc )  - Arrange for interpretive services to assist at discharge as needed  - Refer to Case Management Department for coordinating discharge planning if the patient needs post-hospital services based on physician/advanced practitioner order or complex needs related to functional status, cognitive ability, or social support system  Outcome: Progressing     Problem: Prexisting or High Potential for Compromised Skin Integrity  Goal: Skin integrity is maintained or improved  Description: INTERVENTIONS:  - Identify patients at risk for skin breakdown  - Assess and monitor skin integrity  - Assess and monitor nutrition and hydration status  - Monitor labs   - Assess for incontinence   - Turn and reposition patient  - Assist with mobility/ambulation  - Relieve pressure over bony prominences  - Avoid friction and shearing  - Provide appropriate hygiene as needed including keeping skin clean and dry  - Evaluate need for skin moisturizer/barrier cream  - Collaborate with interdisciplinary team   - Patient/family teaching  - Consider wound care consult   Outcome: Progressing     Problem: Nutrition/Hydration-ADULT  Goal: Nutrient/Hydration intake appropriate for improving, restoring or maintaining nutritional needs  Description: Monitor and assess patient's nutrition/hydration status for malnutrition  Collaborate with interdisciplinary team and initiate plan and interventions as ordered  Monitor patient's weight and dietary intake as ordered or per policy  Utilize nutrition screening tool and intervene as necessary  Determine patient's food preferences and provide high-protein, high-caloric foods as appropriate       INTERVENTIONS:  - Monitor oral intake, urinary output, labs, and treatment plans  - Assess nutrition and hydration status and recommend course of action  - Evaluate amount of meals eaten  - Assist patient with eating if necessary   - Allow adequate time for meals  - Recommend/ encourage appropriate diets, oral nutritional supplements, and vitamin/mineral supplements  - Order, calculate, and assess calorie counts as needed  - Recommend, monitor, and adjust tube feedings and TPN/PPN based on assessed needs  - Assess need for intravenous fluids  - Provide specific nutrition/hydration education as appropriate  - Include patient/family/caregiver in decisions related to nutrition  Outcome: Progressing     Problem: MOBILITY - ADULT  Goal: Maintain or return to baseline ADL function  Description: INTERVENTIONS:  -  Assess patient's ability to carry out ADLs; assess patient's baseline for ADL function and identify physical deficits which impact ability to perform ADLs (bathing, care of mouth/teeth, toileting, grooming, dressing, etc )  - Assess/evaluate cause of self-care deficits   - Assess range of motion  - Assess patient's mobility; develop plan if impaired  - Assess patient's need for assistive devices and provide as appropriate  - Encourage maximum independence but intervene and supervise when necessary  - Involve family in performance of ADLs  - Assess for home care needs following discharge   - Consider OT consult to assist with ADL evaluation and planning for discharge  - Provide patient education as appropriate  Outcome: Progressing  Goal: Maintains/Returns to pre admission functional level  Description: INTERVENTIONS:  - Perform BMAT or MOVE assessment daily    - Set and communicate daily mobility goal to care team and patient/family/caregiver  - Collaborate with rehabilitation services on mobility goals if consulted  - Perform Range of Motion 3 times a day  - Reposition patient every 2 hours    - Dangle patient 3 times a day  - Stand patient 3 times a day  - Ambulate patient 3 times a day  - Out of bed to chair 3 times a day   - Out of bed for meals 3 times a day  - Out of bed for toileting  - Record patient progress and toleration of activity level   Outcome: Progressing

## 2023-03-04 NOTE — PROGRESS NOTES
03/04/23 0930   Pain Assessment   Pain Assessment Tool 0-10   Pain Score 4   Pain Location/Orientation Location: Rib Cage   Hospital Pain Intervention(s) Rest  (had meds prior to session)   Restrictions/Precautions   Precautions Fall Risk;Contact/isolation;Supervision on toilet/commode   Braces or Orthoses   (TEDS)   Cognition   Overall Cognitive Status WFL   Subjective   Subjective reday for therapy, legs feel "heavy at end of session   Sit to Stand   Type of Assistance Needed Supervision   Comment cueing initially to improve mechanics, excellent carryover   Sit to Stand CARE Score 4   Walk 10 Feet   Type of Assistance Needed Incidental touching; Adaptive equipment   Physical Assistance Level No physical assistance   Comment with RW, performed at end of session back to room   Walk 10 Feet CARE Score 4   Walk 50 Feet with Two Turns   Type of Assistance Needed Incidental touching; Adaptive equipment   Physical Assistance Level No physical assistance   Comment with RW, performed at end of session back to room   Walk 50 Feet with Two Turns CARE Score 4   Walk 150 Feet   Type of Assistance Needed Incidental touching; Adaptive equipment   Physical Assistance Level No physical assistance   Comment with RW, performed at end of session back to room   Walk 150 Feet CARE Score 4   Ambulation   Findings pt with good farhan while walking back to room at end of session, used RW for safety, No SOB and able to have conversation while walking   Wheel 50 Feet with Two Turns   Type of Assistance Needed Independent   Wheel 50 Feet with Two Turns CARE Score 6   Wheel 150 Feet   Type of Assistance Needed Independent   Wheel 150 Feet CARE Score 6   Curb or Single Stair   Style negotiated Single stair   Type of Assistance Needed Physical assistance   Physical Assistance Level 26%-50%   1 Step (Curb) CARE Score 3   4 Steps   Type of Assistance Needed Physical assistance   Physical Assistance Level 26%-50%   Comment B HR completed 6 total, leading with each leg for 3 steps   4 Steps CARE Score 3   12 Steps   Comment legs fatigued after 6 steps   Reason if not Attempted Safety concerns   12 Steps CARE Score 88   Therapeutic Interventions   Strengthening LAQ x 10 w/ 3 sec hold, STS 2 x 5 working on body mechanics and using legs primarily  step ups at bottome step of  stairs x 3 reps leading with each leg   Flexibility manual stretch B HS & Calves x 5 min   Balance fwd/bkwd walking along R HR in kennedy 20 ft x 3   Neuromuscular Re-Education Seated cervical rotation left/right with eyes moving to work on VOR, pt reported "dizziness" when walking bkwd and eyes moving to diffrent targets   Assessment   Treatment Assessment Pt reports having good nights sleep, ready for therapy  Participating well with PRN seated rest breaks to aloow legs to recover  Pt will benefit form contineud conditioning exrcise to improve muscle endurance and overall cardiovascular condition for added activity tolerance  Pt working hard and understanding his limitations for safety  Plan   Progress Progressing toward goals   Recommendation   PT Discharge Recommendation Home with home health rehabilitation   Equipment Recommended Walker;Cane   PT Therapy Minutes   PT Time In 0930   PT Time Out 1030   PT Total Time (minutes) 60   PT Mode of treatment - Individual (minutes) 60   PT Mode of treatment - Concurrent (minutes) 0   PT Mode of treatment - Group (minutes) 0   PT Mode of treatment - Co-treat (minutes) 0   PT Mode of Treatment - Total time(minutes) 60 minutes   PT Cumulative Minutes 700   Therapy Time missed   Time missed?  No

## 2023-03-04 NOTE — PROGRESS NOTES
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  • s/p diverting loop colostomy/liver biopsy with subsequent chemotherapy 2/2022  • s/p hepatic resection and reversal of colostomy on 11/7/22  • s/p ex-lap with bowel resection/VAC placement 11/16/22  • s/p right hemicolectomy with partial descending colectomy, colocolonic anastomosis with loop ileostomy and mid line abdominal VAC placement 11/17  • s/p additional drains placed for a perisplenic abscess and splenic abscess 12/20/22 (then had 3 drains plus the already existing perc radha tube)  • The 2 left lateral drains were removed by IR 1/31/23 as an OP  • Had previously been tx'd with Ertapenem  • This hospital stay had fever/leukocytosis/sepsis, started Meropenem and then to Vancomycin and Ertapenem  • Wound cx again = Ecoli ESBL  • LD Ertapenem 2/14, Vancomycin 2/16  • To IR 2/24 = Abscessogram of midline drain showed minimal persistent collection and the midline catheter was removed  • Recent wound debridement by general surgery prior to transfer = continue Santyl qd to wound     Acute cholecystitis  • s/p percutaneous tube placement 12/8/22  • CT scan was concerning for cholecystitis --> to IR 2/24 = showed retraction of the radha tube and interval occlusion of the cystic duct   +Cholecysto enteric fistula   Radha tube was exchanged    • Drainage from perc rahda tube is milky light green (50ml on 3/3/23)  • Surgery saw 2/27/23 = stable     PORT catheter  • On 2/24/23 in IR = port check showed probable small thrombus at tip of cathter, flushed/improved aspiration of the port     HTN/orthostasis/tachycardia  • D/c'd Norvasc 2 5mg qd since BPs were too low to get  • Did have some symptomatic orthostasis on 2/25/23 98 systolic and 4/09/82 with 87/57 standing (102/71 sitting)  • Continue TEDS  • On 3/1/23 AM SBP standing was 87 with HR up to 122 w/o sx and then with PT SBP 98 with sx  • Started Midodrine 2 5mg BID on 3/2/23 (give at 0600, 1100) --> held 3/3/23 AM and lunchtime since SBPs were 140-150  Both doses given today   • When OOB, his HR are 90s to low 100's  Seem to be improving  • TSH on 3/3/23 = 1 35     Diabetes mellitus  • Home:  Lantus 8U qam/Lispro 4U TID  • Here:  Lantus 5U qhs/Lispro 4U TID  • Has a DEXCOM meter and a regular meter at home  • Continue DM diet, QID Accuchecks/SSI  • Increase Lispro to 5U TID     ALEXY/CKD III  • Baseline 1 2-1 4  • Creat was 2 5 on admission with a K+ level of 5 9  • Currently creat is 1 11     Anemia  • Transfused in December and January  • Hemoglobin was 7 1 on 2/26 = transfused x 1  • Hemoglobin stable     Situational depression  • Cymbalta      Left pleural effusion  • Thoracentesis 1/12 for 300ml  • Cyto from pleural fluid was negative for malignancy; cx = NG  • CT 2/22/23 = "Interval worsening of moderate right and small left pleural effusions"  • Will watch     Hyponatremia  • Mild  • Felt likely 2/2 SIADH in setting of malignancy  • On 2/27/23, added NACL 1 GM qd  • On 3/1/23, increased NACL tabs to BID, inc to 2Gm BID 3/3/23    • On 3/3/23 in AM vomited the 1Gm dose but he said it was because it gagged him, not nauseated  • BMP 3/6/23     Malnutrition  • Had Magic cups BID ordered but didn't like them so stopped  • Prefers premier protein banana flavored shake --> wife to brought in   One bottle has 30 Gm protein and only 4 carbs     Low grade temp  • Was 100 3 on evening of 2/25 = afebrile since then  • Has had mild leukocytosis  • ID did see 2/27 = watching for now; if temp >101 then to check blood cxs and consider repeat CT of the abd/pelvis        Discharge date:  Team       The above assessment and plan was reviewed and updated as determined by my evaluation of the patient on 3/4/2023      Labs:   Results from last 7 days   Lab Units 03/03/23  0627 02/28/23  0632   WBC Thousand/uL 13 23* 13 07*   HEMOGLOBIN g/dL 9 4* 8 3*   HEMATOCRIT % 30 2* 26 6*   PLATELETS Thousands/uL 371 363     Results from last 7 days   Lab Units 03/03/23  0627 03/01/23  0623   SODIUM mmol/L 129* 131*   POTASSIUM mmol/L 3 8 3 9   CHLORIDE mmol/L 101 104   CO2 mmol/L 19* 20*   BUN mg/dL 16 14   CREATININE mg/dL 1 08 1 11   CALCIUM mg/dL 8 5 8 3             Results from last 7 days   Lab Units 03/04/23  1050 03/04/23  0608 03/03/23  2046   POC GLUCOSE mg/dl 196* 118 145*       Review of Scheduled Meds:  Current Facility-Administered Medications   Medication Dose Route Frequency Provider Last Rate   • acetaminophen  650 mg Oral Q4H PRN Arash Bologna, DO     • aspirin  81 mg Oral Daily Arash Bologna, DO     • atorvastatin  40 mg Oral Daily [facility]     • calcium carbonate  500 mg Oral TID PRN GAURANG Lopez     • collagenase   Topical Daily Arash Bologna, DO     • DULoxetine  30 mg Oral Daily [facility]     • enoxaparin  40 mg Subcutaneous Q24H DE [facility] GAURANG Keith     • hydrOXYzine HCL  10 mg Oral HS [facility], DO     • insulin glargine  5 Units Subcutaneous HS [facility]     • insulin lispro  1-6 Units Subcutaneous TID AC [facility] DO     • insulin lispro  1-6 Units Subcutaneous HS [facility]     • insulin lispro  4 Units Subcutaneous TID With Meals Arash Bologna, DO     • melatonin  6 mg Oral HS PRN Arash Bologna, DO     • methocarbamol  500 mg Oral BID PRN [facility], [facility]     • midodrine  2 5 mg Oral BID AC GAURANG Wong     • ondansetron  4 mg Intravenous Q4H PRN [facility], [facility]     • oxyCODONE  2 5 mg Oral Q4H PRN Arash Bologna, DO     • oxyCODONE  5 mg Oral Q4H PRN Arash Salinas, DO     • pantoprazole  40 mg Oral BID AC Arash Salinas, DO     • simethicone  80 mg Oral 4x Daily (with meals and at bedtime) Arash Salinas, DO     • sodium chloride  2 g Oral BID With Meals Skip GAURANG Jeffries     • tamsulosin  0 4 mg Oral Daily With 1 Hospital DO [place]         Subjective/ HPI: Patient seen and examined  Patients overnight issues or events were reviewed with nursing or staff during rounds or morning huddle session  New or overnight issues include the following:     No new or overnight issues  Offers no complaints    ROS:   A 10 point ROS was performed; negative except as noted above  Imaging:     No orders to display       *Labs /Radiology studies reviewed  *Medications reviewed and reconciled as needed  *Please refer to order section for additional ordered labs studies  *Case discussed with primary attending during morning huddle case rounds    Physical Examination:  Vitals:   Vitals:    03/03/23 0900 03/03/23 1344 03/03/23 2100 03/04/23 0511   BP: 141/84 138/82 122/78 122/75   BP Location: Right leg Right arm Left arm Left arm   Pulse: 100 92 (!) 115 88   Resp: 16 18 18 20   Temp: 98 4 °F (36 9 °C) 98 3 °F (36 8 °C) 99 5 °F (37 5 °C) 98 2 °F (36 8 °C)   TempSrc:  Oral Oral Oral   SpO2: 95% 96% 95% 95%   Weight:       Height:           General Appearance: no distress, conversive  HEENT: PERRLA, conjuctiva normal; oropharynx clear; mucous membranes moist   Neck:  Supple, normal ROM  Lungs: CTA, normal respiratory effort, no retractions, expiratory effort normal  CV: regular rate and rhythm; no rubs/murmurs/gallops, PMI normal   ABD: soft; ND/NT; +BS  Ostomy with watery yellow stool  Perc radha tube with milky light green drainage  EXT: no edema  Skin: normal turgor, normal texture, no rashes  Psych: affect normal, mood normal  Neuro: AAO      The above physical exam was reviewed and updated as determined by my evaluation of the patient on 3/4/2023      Invasive Devices     Central Venous Catheter Line  Duration           Port A Cath 03/10/22 Right Chest 359 days          Drain  Duration           Ileostomy  days Cholecystostomy Tube 8 days                   VTE Pharmacologic Prophylaxis: Enoxaparin  Code Status: Level 1 - Full Code  Current Length of Stay: 8 day(s)      Total time spent:  30 minutes with more than 50% spent counseling/coordinating care  Counseling includes discussion with patient re: progress  and discussion with patient of his/her current medical state/information  Coordination of patient's care was performed in conjunction with primary service  Time invested included review of patient's labs, vitals, and management of their comorbidities with continued monitoring  In addition, this patient was discussed with medical team including physician and advanced extenders  The care of the patient was extensively discussed and appropriate treatment plan was formulated unique for this patient  Medical decision making for the day was made by supervising physician unless otherwise noted in their attestation statement  ** Please Note:  voice to text software may have been used in the creation of this document   Although proof errors in transcription or interpretation are a potential of such software**

## 2023-03-04 NOTE — PLAN OF CARE
Problem: PAIN - ADULT  Goal: Verbalizes/displays adequate comfort level or baseline comfort level  Description: Interventions:  - Encourage patient to monitor pain and request assistance  - Assess pain using appropriate pain scale  - Administer analgesics based on type and severity of pain and evaluate response  - Implement non-pharmacological measures as appropriate and evaluate response  - Consider cultural and social influences on pain and pain management  - Notify physician/advanced practitioner if interventions unsuccessful or patient reports new pain  Outcome: Progressing     Problem: INFECTION - ADULT  Goal: Absence or prevention of progression during hospitalization  Description: INTERVENTIONS:  - Assess and monitor for signs and symptoms of infection  - Monitor lab/diagnostic results  - Monitor all insertion sites, i e  indwelling lines, tubes, and drains  - Monitor endotracheal if appropriate and nasal secretions for changes in amount and color  - [place] appropriate cooling/warming therapies per order  - Administer medications as ordered  - Instruct and encourage patient and family to use good hand hygiene technique  - Identify and instruct in appropriate isolation precautions for identified infection/condition  Outcome: Progressing  Goal: Absence of fever/infection during neutropenic period  Description: INTERVENTIONS:  - Monitor WBC    Outcome: Progressing     Problem: SAFETY ADULT  Goal: Patient will remain free of falls  Description: INTERVENTIONS:  - Educate patient/family on patient safety including physical limitations  - Instruct patient to call for assistance with activity   - Consult OT/PT to assist with strengthening/mobility   - Keep Call bell within reach  - Keep bed low and locked with side rails adjusted as appropriate  - Keep care items and personal belongings within reach  - Initiate and maintain comfort rounds  - Make Fall Risk Sign visible to staff  - Offer Toileting every 2 Hours, in advance of need  - Initiate/Maintain bed alarm  - Obtain necessary fall risk management equipment: bed   - Apply yellow socks and bracelet for high fall risk patients  - Consider moving patient to room near nurses station  Outcome: Progressing  Goal: Maintain or return to baseline ADL function  Description: INTERVENTIONS:  -  Assess patient's ability to carry out ADLs; assess patient's baseline for ADL function and identify physical deficits which impact ability to perform ADLs (bathing, care of mouth/teeth, toileting, grooming, dressing, etc )  - Assess/evaluate cause of self-care deficits   - Assess range of motion  - Assess patient's mobility; develop plan if impaired  - Assess patient's need for assistive devices and provide as appropriate  - Encourage maximum independence but intervene and supervise when necessary  - Involve family in performance of ADLs  - Assess for home care needs following discharge   - Consider OT consult to assist with ADL evaluation and planning for discharge  - Provide patient education as appropriate  Outcome: Progressing  Goal: Maintains/Returns to pre admission functional level  Description: INTERVENTIONS:  - Perform BMAT or MOVE assessment daily    - Set and communicate daily mobility goal to care team and patient/family/caregiver  - Collaborate with rehabilitation services on mobility goals if consulted  - Perform Range of Motion 3 times a day  - Reposition patient every 2 hours    - Dangle patient 3 times a day  - Stand patient 3 times a day  - Ambulate patient 3 times a day  - Out of bed to chair 3 times a day   - Out of bed for meals 3 times a day  - Out of bed for toileting  - Record patient progress and toleration of activity level   Outcome: Progressing     Problem: DISCHARGE PLANNING  Goal: Discharge to home or other facility with appropriate resources  Description: INTERVENTIONS:  - Identify barriers to discharge w/patient and caregiver  - Arrange for needed discharge resources and transportation as appropriate  - Identify discharge learning needs (meds, wound care, etc )  - Arrange for interpretive services to assist at discharge as needed  - Refer to Case Management Department for coordinating discharge planning if the patient needs post-hospital services based on physician/advanced practitioner order or complex needs related to functional status, cognitive ability, or social support system  Outcome: Progressing     Problem: Prexisting or High Potential for Compromised Skin Integrity  Goal: Skin integrity is maintained or improved  Description: INTERVENTIONS:  - Identify patients at risk for skin breakdown  - Assess and monitor skin integrity  - Assess and monitor nutrition and hydration status  - Monitor labs   - Assess for incontinence   - Turn and reposition patient  - Assist with mobility/ambulation  - Relieve pressure over bony prominences  - Avoid friction and shearing  - Provide appropriate hygiene as needed including keeping skin clean and dry  - Evaluate need for skin moisturizer/barrier cream  - Collaborate with interdisciplinary team   - Patient/family teaching  - Consider wound care consult   Outcome: Progressing     Problem: Nutrition/Hydration-ADULT  Goal: Nutrient/Hydration intake appropriate for improving, restoring or maintaining nutritional needs  Description: Monitor and assess patient's nutrition/hydration status for malnutrition  Collaborate with interdisciplinary team and initiate plan and interventions as ordered  Monitor patient's weight and dietary intake as ordered or per policy  Utilize nutrition screening tool and intervene as necessary  Determine patient's food preferences and provide high-protein, high-caloric foods as appropriate       INTERVENTIONS:  - Monitor oral intake, urinary output, labs, and treatment plans  - Assess nutrition and hydration status and recommend course of action  - Evaluate amount of meals eaten  - Assist patient with eating if necessary   - Allow adequate time for meals  - Recommend/ encourage appropriate diets, oral nutritional supplements, and vitamin/mineral supplements  - Order, calculate, and assess calorie counts as needed  - Recommend, monitor, and adjust tube feedings and TPN/PPN based on assessed needs  - Assess need for intravenous fluids  - Provide specific nutrition/hydration education as appropriate  - Include patient/family/caregiver in decisions related to nutrition  Outcome: Progressing     Problem: MOBILITY - ADULT  Goal: Maintain or return to baseline ADL function  Description: INTERVENTIONS:  -  Assess patient's ability to carry out ADLs; assess patient's baseline for ADL function and identify physical deficits which impact ability to perform ADLs (bathing, care of mouth/teeth, toileting, grooming, dressing, etc )  - Assess/evaluate cause of self-care deficits   - Assess range of motion  - Assess patient's mobility; develop plan if impaired  - Assess patient's need for assistive devices and provide as appropriate  - Encourage maximum independence but intervene and supervise when necessary  - Involve family in performance of ADLs  - Assess for home care needs following discharge   - Consider OT consult to assist with ADL evaluation and planning for discharge  - Provide patient education as appropriate  Outcome: Progressing  Goal: Maintains/Returns to pre admission functional level  Description: INTERVENTIONS:  - Perform BMAT or MOVE assessment daily    - Set and communicate daily mobility goal to care team and patient/family/caregiver  - Collaborate with rehabilitation services on mobility goals if consulted  - Perform Range of Motion 3 times a day  - Reposition patient every 2 hours    - Dangle patient 3 times a day  - Stand patient 3 times a day  - Ambulate patient 3 times a day  - Out of bed to chair 3 times a day   - Out of bed for meals 3 times a day  - Out of bed for toileting  - Record patient progress and toleration of activity level   Outcome: Progressing

## 2023-03-05 LAB
GLUCOSE SERPL-MCNC: 100 MG/DL (ref 65–140)
GLUCOSE SERPL-MCNC: 115 MG/DL (ref 65–140)
GLUCOSE SERPL-MCNC: 129 MG/DL (ref 65–140)
GLUCOSE SERPL-MCNC: 130 MG/DL (ref 65–140)

## 2023-03-05 RX ADMIN — CARBIDOPA AND LEVODOPA 2.5 MG: 50; 200 TABLET, EXTENDED RELEASE ORAL at 06:29

## 2023-03-05 RX ADMIN — CARBIDOPA AND LEVODOPA 2.5 MG: 50; 200 TABLET, EXTENDED RELEASE ORAL at 11:40

## 2023-03-05 RX ADMIN — ASPIRIN 81 MG CHEWABLE TABLET 81 MG: 81 TABLET CHEWABLE at 08:01

## 2023-03-05 RX ADMIN — ATORVASTATIN CALCIUM 40 MG: 40 TABLET, FILM COATED ORAL at 08:01

## 2023-03-05 RX ADMIN — ENOXAPARIN SODIUM 40 MG: 40 INJECTION SUBCUTANEOUS at 08:02

## 2023-03-05 RX ADMIN — COLLAGENASE SANTYL: 250 OINTMENT TOPICAL at 15:53

## 2023-03-05 RX ADMIN — INSULIN LISPRO 5 UNITS: 100 INJECTION, SOLUTION INTRAVENOUS; SUBCUTANEOUS at 16:53

## 2023-03-05 RX ADMIN — INSULIN LISPRO 5 UNITS: 100 INJECTION, SOLUTION INTRAVENOUS; SUBCUTANEOUS at 07:59

## 2023-03-05 RX ADMIN — DULOXETINE HYDROCHLORIDE 30 MG: 30 CAPSULE, DELAYED RELEASE ORAL at 08:01

## 2023-03-05 RX ADMIN — OXYCODONE HYDROCHLORIDE 5 MG: 5 TABLET ORAL at 08:11

## 2023-03-05 RX ADMIN — HYDROXYZINE HYDROCHLORIDE 10 MG: 10 TABLET ORAL at 21:13

## 2023-03-05 RX ADMIN — SODIUM CHLORIDE 2 G: 1 TABLET ORAL at 08:01

## 2023-03-05 RX ADMIN — PANTOPRAZOLE SODIUM 40 MG: 40 TABLET, DELAYED RELEASE ORAL at 15:52

## 2023-03-05 RX ADMIN — OXYCODONE HYDROCHLORIDE 5 MG: 5 TABLET ORAL at 21:13

## 2023-03-05 RX ADMIN — SIMETHICONE 80 MG: 80 TABLET, CHEWABLE ORAL at 11:40

## 2023-03-05 RX ADMIN — INSULIN GLARGINE 5 UNITS: 100 INJECTION, SOLUTION SUBCUTANEOUS at 21:13

## 2023-03-05 RX ADMIN — TAMSULOSIN HYDROCHLORIDE 0.4 MG: 0.4 CAPSULE ORAL at 16:53

## 2023-03-05 RX ADMIN — SIMETHICONE 80 MG: 80 TABLET, CHEWABLE ORAL at 08:01

## 2023-03-05 RX ADMIN — SODIUM CHLORIDE 2 G: 1 TABLET ORAL at 16:53

## 2023-03-05 RX ADMIN — INSULIN LISPRO 5 UNITS: 100 INJECTION, SOLUTION INTRAVENOUS; SUBCUTANEOUS at 11:41

## 2023-03-05 RX ADMIN — SIMETHICONE 80 MG: 80 TABLET, CHEWABLE ORAL at 21:13

## 2023-03-05 RX ADMIN — PANTOPRAZOLE SODIUM 40 MG: 40 TABLET, DELAYED RELEASE ORAL at 06:29

## 2023-03-05 RX ADMIN — SIMETHICONE 80 MG: 80 TABLET, CHEWABLE ORAL at 16:52

## 2023-03-05 RX ADMIN — ONDANSETRON 4 MG: 2 INJECTION INTRAMUSCULAR; INTRAVENOUS at 07:54

## 2023-03-05 NOTE — PROGRESS NOTES
03/05/23 0830   Pain Assessment   Pain Assessment Tool 0-10   Pain Score 4   Pain Location/Orientation Location: Rib Cage   Effect of Pain on Daily Activities able to participate fully with therapy   Restrictions/Precautions   Precautions Contact/isolation; Fall Risk;Supervision on toilet/commode;Pain   Braces or Orthoses   (ALVARO stockings)   Cognition   Overall Cognitive Status WFL   Subjective   Subjective slept very well, ready for therapy, did have c/o "lightheaded or dizzy" with mobility today  Lying to Sitting on Side of Bed   Type of Assistance Needed Set-up / clean-up   Lying to Sitting on Side of Bed CARE Score 5   Sit to Stand   Type of Assistance Needed Supervision   Comment CS   Sit to Stand CARE Score 4   Bed-Chair Transfer   Type of Assistance Needed Supervision   Comment CS SPT w/o device, using UE support on armrest of chair   Chair/Bed-to-Chair Transfer CARE Score 4   Car Transfer   Type of Assistance Needed Physical assistance   Physical Assistance Level 25% or less   Comment required boost from low seat, has SUV which is easier   Car Transfer CARE Score 3   Walk 10 Feet   Type of Assistance Needed Physical assistance; Adaptive equipment   Physical Assistance Level 25% or less   Comment working with [facility] today to work on balance   Walk 10 Feet CARE Score 3   Walk 50 Feet with Two Turns   Type of Assistance Needed Physical assistance; Adaptive equipment   Physical Assistance Level 25% or less   Comment working with [facility] today to work on balance   Walk 50 Feet with Two Turns CARE Score 3   Walk 150 Feet   Comment fatigued with walking with SPC today   Reason if not Attempted Safety concerns   Walk 150 Feet CARE Score 88   Ambulation   Distance Walked (feet) 75 ft  (x2)   Assist Device Cane   Gait Pattern Slow Josefina;Decreased foot clearance;Narrow LUIS   Limitations Noted In Balance; Endurance;Strength;Speed   Provided Assistance with: Balance   Findings worked with [facility] to challenge balance today Does the patient walk? 2  Yes   Therapeutic Interventions   Flexibility passive stretch B HS and calves   Balance worked walking chair to chair 5ft apart no device, CS/CGA, around stool, practiced with cane as well, alt toe taps on 4 " step 1 UE support   Assessment   Treatment Assessment Excellent carryover with STS transfers  Pt reporting throughout activities on his feet feeling "lightheded or dizzy"  Resoleved with sitting break within 1 min  BP checked 106/68 seated when symptomatic, drop from 124/72 earlier  Balance slowly improving, remains safest with RW for walking  Stairs remain challenge, limited in quantity due to endurance and overall strength  He wants to be able to get up FF of stairs to his own bed  PT Barriers   Physical Impairment Impaired balance;Decreased endurance;Decreased strength   Functional Limitation Walking;Transfers;Standing;Stair negotiation;Ramp negotiation;Car transfers   Plan   Treatment/Interventions LE strengthening/ROM; Functional transfer training; Therapeutic exercise; Endurance training;Cognitive reorientation;Patient/family training;Bed mobility; Equipment eval/education;Gait training   Progress Progressing toward goals   Recommendation   PT Discharge Recommendation Home with home health rehabilitation   Equipment Recommended Walker;Cane   PT Therapy Minutes   PT Time In 0830   PT Time Out 0930   PT Total Time (minutes) 60   PT Mode of treatment - Individual (minutes) 60   PT Mode of treatment - Concurrent (minutes) 0   PT Mode of treatment - Group (minutes) 0   PT Mode of treatment - Co-treat (minutes) 0   PT Mode of Treatment - Total time(minutes) 60 minutes   PT Cumulative Minutes 760   Therapy Time missed   Time missed?  No

## 2023-03-05 NOTE — PROGRESS NOTES
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  • s/p diverting loop colostomy/liver biopsy with subsequent chemotherapy 2/2022  • s/p hepatic resection and reversal of colostomy on 11/7/22  • s/p ex-lap with bowel resection/VAC placement 11/16/22  • s/p right hemicolectomy with partial descending colectomy, colocolonic anastomosis with loop ileostomy and mid line abdominal VAC placement 11/17  • s/p additional drains placed for a perisplenic abscess and splenic abscess 12/20/22 (then had 3 drains plus the already existing perc radha tube)  • The 2 left lateral drains were removed by IR 1/31/23 as an OP  • Had previously been tx'd with Ertapenem  • This hospital stay had fever/leukocytosis/sepsis, started Meropenem and then to Vancomycin and Ertapenem  • Wound cx again = Ecoli ESBL  • LD Ertapenem 2/14, Vancomycin 2/16  • To IR 2/24 = Abscessogram of midline drain showed minimal persistent collection and the midline catheter was removed  • Recent wound debridement by general surgery prior to transfer = continue Santyl qd to wound  • Has chr elevated Alk phos  • For CMP in AM     Acute cholecystitis  • s/p percutaneous tube placement 12/8/22  • CT scan was concerning for cholecystitis --> to IR 2/24 = showed retraction of the radha tube and interval occlusion of the cystic duct   +Cholecysto enteric fistula   Radha tube was exchanged    • Drainage from perc radha tube is milky light green (20ml on 3/4/23)  • Surgery saw 2/27/23 = stable     PORT catheter  • On 2/24/23 in IR = port check showed probable small thrombus at tip of cathter, flushed/improved aspiration of the port     HTN/orthostasis/tachycardia  • D/c'd Norvasc 2 5mg qd since BPs were too low to get  • Did have some symptomatic orthostasis on 7/31/12 98 systolic and 1/09/83 with 87/57 standing (102/71 sitting)  • Continue TEDS  • On 3/1/23 AM SBP standing was 87 with HR up to 122 w/o sx and then with PT SBP 98 with sx  • Started Midodrine 2 5mg BID on 3/2/23 (give at 0600, 1100) --> held 3/3/23 AM and lunchtime since SBPs were 140-150  Both doses given last 2 days  • When OOB, his HR are 90s to low 100's  Seem to be improving  • TSH on 3/3/23 = 1 35     Diabetes mellitus  • Home:  Lantus 8U qam/Lispro 4U TID  • Here:  Lantus 5U qhs/Lispro 5U TID (increased from 4U starting last evening)   • Has a DEXCOM meter and a regular meter at home  • Continue DM diet, QID Accuchecks/SSI  • No changes today     ALEXY/CKD III  • Baseline 1 2-1 4  • Creat was 2 5 on admission with a K+ level of 5 9  • Currently creat is 1 11     Anemia  • Transfused in December and January  • Hemoglobin was 7 1 on 2/26 = transfused x 1  • Hemoglobin stable     Situational depression  • Cymbalta      Left pleural effusion  • Thoracentesis 1/12 for 300ml  • Cyto from pleural fluid was negative for malignancy; cx = NG  • CT 2/22/23 = "Interval worsening of moderate right and small left pleural effusions"  • Has had no SOB  • Will watch     Hyponatremia  • Mild  • Felt likely 2/2 SIADH in setting of malignancy  • On 2/27/23, added NACL 1 GM qd  • On 3/1/23, increased NACL tabs to BID, inc to 2Gm BID 3/3/23    • On 3/3/23 in AM vomited the 1Gm dose but he said it was because it gagged him, not nauseated  • CMP 3/6/23     Malnutrition  • Had Magic cups BID ordered but didn't like them so stopped  • Prefers premier protein banana flavored shake --> wife brought in   One bottle has 30 Gm protein and only 4 carbs     Low grade temp  • Was 100 3 on evening of 2/25 = afebrile since then  • Has had mild leukocytosis  • ID did see 2/27 = watching for now; if temp >101 then to check blood cxs and consider repeat CT of the abd/pelvis        Discharge date:  Team       The above assessment and plan was reviewed and updated as determined by my evaluation of the patient on 3/5/2023      Labs:   Results from last 7 days   Lab Units 03/03/23  0627 02/28/23  0632   WBC Thousand/uL 13 23* 13 07*   HEMOGLOBIN g/dL 9 4* 8 3*   HEMATOCRIT % 30 2* 26 6*   PLATELETS Thousands/uL 371 363     Results from last 7 days   Lab Units 03/03/23  0627 03/01/23  0623   SODIUM mmol/L 129* 131*   POTASSIUM mmol/L 3 8 3 9   CHLORIDE mmol/L 101 104   CO2 mmol/L 19* 20*   BUN mg/dL 16 14   CREATININE mg/dL 1 08 1 11   CALCIUM mg/dL 8 5 8 3             Results from last 7 days   Lab Units 03/05/23  1055 03/05/23  0631 03/04/23  2127   POC GLUCOSE mg/dl 129 100 114       Review of Scheduled Meds:  Current Facility-Administered Medications   Medication Dose Route Frequency Provider Last Rate   • acetaminophen  650 mg Oral Q4H PRN Marquita Citizen [place], DO     • aspirin  81 mg Oral Daily Marquita Citizen [place], DO     • atorvastatin  40 mg Oral Daily Marquita Citizen [place], DO     • calcium carbonate  500 mg Oral TID PRN GAURANG Zambrano     • collagenase   Topical Daily Marquita [place], DO     • DULoxetine  30 mg Oral Daily Marquita Citizen [place], DO     • enoxaparin  40 mg Subcutaneous Q24H [address] GAURANG Keith     • hydrOXYzine HCL  10 mg Oral HS Marquita Citizen [place], DO     • insulin glargine  5 Units Subcutaneous HS Marquita Citizen [place], DO     • insulin lispro  1-6 Units Subcutaneous TID AC Marquita Citizen [place], DO     • insulin lispro  1-6 Units Subcutaneous HS Marquita Citizen [place], DO     • insulin lispro  5 Units Subcutaneous TID With Meals Skip GAURANG Jeffries     • melatonin  6 mg Oral HS PRN Marquita Citizen [place], DO     • methocarbamol  500 mg Oral BID PRN Marquita Citizen [place], DO     • midodrine  2 5 mg Oral BID AC GAURANG Trotter     • ondansetron  4 mg Intravenous Q4H PRN Marquita Citizen [place], DO     • oxyCODONE  2 5 mg Oral Q4H PRN Marquita Citizen [place], DO     • oxyCODONE  5 mg Oral Q4H PRN Marquita Citizen [place], DO     • pantoprazole  40 mg Oral BID AC Ida To DO     • simethicone  80 mg Oral 4x Daily (with meals and at bedtime) Ida To DO     • sodium chloride  2 g Oral BID With Meals GAURANG Jo     • tamsulosin  0 4 mg Oral Daily With [address] [place] [address] DO Germania         Subjective/ HPI: Patient seen and examined  Patients overnight issues or events were reviewed with nursing or staff during rounds or morning huddle session  New or overnight issues include the following:     No new or overnight issues  Offers no complaints    ROS:   A 10 point ROS was performed; negative except as noted above  Imaging:     No orders to display       *Labs /Radiology studies reviewed  *Medications reviewed and reconciled as needed  *Please refer to order section for additional ordered labs studies  *Case discussed with primary attending during morning huddle case rounds    Physical Examination:  Vitals:   Vitals:    03/04/23 0511 03/04/23 1418 03/04/23 2100 03/05/23 0500   BP: 122/75 104/66 132/80 121/72   BP Location: Left arm Left arm Left arm Left arm   Pulse: 88 99 103 97   Resp: 20 16 16 16   Temp: 98 2 °F (36 8 °C) 98 4 °F (36 9 °C) 98 3 °F (36 8 °C) 99 2 °F (37 3 °C)   TempSrc: Oral Oral Oral Oral   SpO2: 95% 99% 97% 96%   Weight:       Height:           General Appearance: no distress, conversive  HEENT:  External ear normal   Nose normal w/o drainage  Mucous membranes are moist  Oropharynx is clear  Conjunctiva clear w/o icterus or redness  Neck:  Supple, normal ROM  Lungs: CTA, normal respiratory effort, no retractions, expiratory effort normal  CV: regular rate and rhythm; no rubs/murmurs/gallops, PMI normal   ABD: Abdomen is soft  Bowel sounds all quadrants  Nontender with no distention  Ostomy with watery yellow/brown stool    Perc radha tube draining light green milky fluid  EXT: no edema  Skin: normal turgor, normal texture, no rashes  Psych: affect normal, mood normal  Neuro: AAO      The above physical exam was reviewed and updated as determined by my evaluation of the patient on 3/5/2023  Invasive Devices     Central Venous Catheter Line  Duration           Port A Cath 03/10/22 Right Chest 360 days          Drain  Duration           Ileostomy  days    Cholecystostomy Tube 9 days                   VTE Pharmacologic Prophylaxis: Enoxaparin  Code Status: Level 1 - Full Code  Current Length of Stay: 9 day(s)      Total time spent:  30 minutes with more than 50% spent counseling/coordinating care  Counseling includes discussion with patient re: progress  and discussion with patient of his/her current medical state/information  Coordination of patient's care was performed in conjunction with primary service  Time invested included review of patient's labs, vitals, and management of their comorbidities with continued monitoring  In addition, this patient was discussed with medical team including physician and advanced extenders  The care of the patient was extensively discussed and appropriate treatment plan was formulated unique for this patient  Medical decision making for the day was made by supervising physician unless otherwise noted in their attestation statement  ** Please Note:  voice to text software may have been used in the creation of this document   Although proof errors in transcription or interpretation are a potential of such software**

## 2023-03-05 NOTE — PLAN OF CARE
Problem: PAIN - ADULT  Goal: Verbalizes/displays adequate comfort level or baseline comfort level  Description: Interventions:  - Encourage patient to monitor pain and request assistance  - Assess pain using appropriate pain scale  - Administer analgesics based on type and severity of pain and evaluate response  - Implement non-pharmacological measures as appropriate and evaluate response  - Consider cultural and social influences on pain and pain management  - Notify physician/advanced practitioner if interventions unsuccessful or patient reports new pain  Outcome: Progressing     Problem: INFECTION - ADULT  Goal: Absence or prevention of progression during hospitalization  Description: INTERVENTIONS:  - Assess and monitor for signs and symptoms of infection  - Monitor lab/diagnostic results  - Monitor all insertion sites, i e  indwelling lines, tubes, and drains  - Monitor endotracheal if appropriate and nasal secretions for changes in amount and color  - Leonard appropriate cooling/warming therapies per order  - Administer medications as ordered  - Instruct and encourage patient and family to use good hand hygiene technique  - Identify and instruct in appropriate isolation precautions for identified infection/condition  Outcome: Progressing  Goal: Absence of fever/infection during neutropenic period  Description: INTERVENTIONS:  - Monitor WBC    Outcome: Progressing     Problem: SAFETY ADULT  Goal: Patient will remain free of falls  Description: INTERVENTIONS:  - Educate patient/family on patient safety including physical limitations  - Instruct patient to call for assistance with activity   - Consult OT/PT to assist with strengthening/mobility   - Keep Call bell within reach  - Keep bed low and locked with side rails adjusted as appropriate  - Keep care items and personal belongings within reach  - Initiate and maintain comfort rounds  - Make Fall Risk Sign visible to staff  - Offer Toileting every 2 Hours, in advance of need  - Initiate/Maintain bed alarm  - Obtain necessary fall risk management equipment: bed   - Apply yellow socks and bracelet for high fall risk patients  - Consider moving patient to room near nurses station  Outcome: Progressing  Goal: Maintain or return to baseline ADL function  Description: INTERVENTIONS:  -  Assess patient's ability to carry out ADLs; assess patient's baseline for ADL function and identify physical deficits which impact ability to perform ADLs (bathing, care of mouth/teeth, toileting, grooming, dressing, etc )  - Assess/evaluate cause of self-care deficits   - Assess range of motion  - Assess patient's mobility; develop plan if impaired  - Assess patient's need for assistive devices and provide as appropriate  - Encourage maximum independence but intervene and supervise when necessary  - Involve family in performance of ADLs  - Assess for home care needs following discharge   - Consider OT consult to assist with ADL evaluation and planning for discharge  - Provide patient education as appropriate  Outcome: Progressing  Goal: Maintains/Returns to pre admission functional level  Description: INTERVENTIONS:  - Perform BMAT or MOVE assessment daily    - Set and communicate daily mobility goal to care team and patient/family/caregiver  - Collaborate with rehabilitation services on mobility goals if consulted  - Perform Range of Motion 3 times a day  - Reposition patient every 2 hours    - Dangle patient 3 times a day  - Stand patient 3 times a day  - Ambulate patient 3 times a day  - Out of bed to chair 3 times a day   - Out of bed for meals 3 times a day  - Out of bed for toileting  - Record patient progress and toleration of activity level   Outcome: Progressing     Problem: DISCHARGE PLANNING  Goal: Discharge to home or other facility with appropriate resources  Description: INTERVENTIONS:  - Identify barriers to discharge w/patient and caregiver  - Arrange for needed discharge resources and transportation as appropriate  - Identify discharge learning needs (meds, wound care, etc )  - Arrange for interpretive services to assist at discharge as needed  - Refer to Case Management Department for coordinating discharge planning if the patient needs post-hospital services based on physician/advanced practitioner order or complex needs related to functional status, cognitive ability, or social support system  Outcome: Progressing     Problem: Prexisting or High Potential for Compromised Skin Integrity  Goal: Skin integrity is maintained or improved  Description: INTERVENTIONS:  - Identify patients at risk for skin breakdown  - Assess and monitor skin integrity  - Assess and monitor nutrition and hydration status  - Monitor labs   - Assess for incontinence   - Turn and reposition patient  - Assist with mobility/ambulation  - Relieve pressure over bony prominences  - Avoid friction and shearing  - Provide appropriate hygiene as needed including keeping skin clean and dry  - Evaluate need for skin moisturizer/barrier cream  - Collaborate with interdisciplinary team   - Patient/family teaching  - Consider wound care consult   Outcome: Progressing     Problem: Nutrition/Hydration-ADULT  Goal: Nutrient/Hydration intake appropriate for improving, restoring or maintaining nutritional needs  Description: Monitor and assess patient's nutrition/hydration status for malnutrition  Collaborate with interdisciplinary team and initiate plan and interventions as ordered  Monitor patient's weight and dietary intake as ordered or per policy  Utilize nutrition screening tool and intervene as necessary  Determine patient's food preferences and provide high-protein, high-caloric foods as appropriate       INTERVENTIONS:  - Monitor oral intake, urinary output, labs, and treatment plans  - Assess nutrition and hydration status and recommend course of action  - Evaluate amount of meals eaten  - Assist patient with eating if necessary   - Allow adequate time for meals  - Recommend/ encourage appropriate diets, oral nutritional supplements, and vitamin/mineral supplements  - Order, calculate, and assess calorie counts as needed  - Recommend, monitor, and adjust tube feedings and TPN/PPN based on assessed needs  - Assess need for intravenous fluids  - Provide specific nutrition/hydration education as appropriate  - Include patient/family/caregiver in decisions related to nutrition  Outcome: Progressing     Problem: MOBILITY - ADULT  Goal: Maintain or return to baseline ADL function  Description: INTERVENTIONS:  -  Assess patient's ability to carry out ADLs; assess patient's baseline for ADL function and identify physical deficits which impact ability to perform ADLs (bathing, care of mouth/teeth, toileting, grooming, dressing, etc )  - Assess/evaluate cause of self-care deficits   - Assess range of motion  - Assess patient's mobility; develop plan if impaired  - Assess patient's need for assistive devices and provide as appropriate  - Encourage maximum independence but intervene and supervise when necessary  - Involve family in performance of ADLs  - Assess for home care needs following discharge   - Consider OT consult to assist with ADL evaluation and planning for discharge  - Provide patient education as appropriate  Outcome: Progressing  Goal: Maintains/Returns to pre admission functional level  Description: INTERVENTIONS:  - Perform BMAT or MOVE assessment daily    - Set and communicate daily mobility goal to care team and patient/family/caregiver  - Collaborate with rehabilitation services on mobility goals if consulted  - Perform Range of Motion 3 times a day  - Reposition patient every 2 hours    - Dangle patient 3 times a day  - Stand patient 3 times a day  - Ambulate patient 3 times a day  - Out of bed to chair 3 times a day   - Out of bed for meals 3 times a day  - Out of bed for toileting  - Record patient progress and toleration of activity level   Outcome: Progressing

## 2023-03-05 NOTE — PLAN OF CARE
Problem: PAIN - ADULT  Goal: Verbalizes/displays adequate comfort level or baseline comfort level  Description: Interventions:  - Encourage patient to monitor pain and request assistance  - Assess pain using appropriate pain scale  - Administer analgesics based on type and severity of pain and evaluate response  - Implement non-pharmacological measures as appropriate and evaluate response  - Consider cultural and social influences on pain and pain management  - Notify physician/advanced practitioner if interventions unsuccessful or patient reports new pain  Outcome: Progressing     Problem: INFECTION - ADULT  Goal: Absence or prevention of progression during hospitalization  Description: INTERVENTIONS:  - Assess and monitor for signs and symptoms of infection  - Monitor lab/diagnostic results  - Monitor all insertion sites, i e  indwelling lines, tubes, and drains  - Monitor endotracheal if appropriate and nasal secretions for changes in amount and color  - [place] appropriate cooling/warming therapies per order  - Administer medications as ordered  - Instruct and encourage patient and family to use good hand hygiene technique  - Identify and instruct in appropriate isolation precautions for identified infection/condition  Outcome: Progressing  Goal: Absence of fever/infection during neutropenic period  Description: INTERVENTIONS:  - Monitor WBC    Outcome: Progressing     Problem: SAFETY ADULT  Goal: Patient will remain free of falls  Description: INTERVENTIONS:  - Educate patient/family on patient safety including physical limitations  - Instruct patient to call for assistance with activity   - Consult OT/PT to assist with strengthening/mobility   - Keep Call bell within reach  - Keep bed low and locked with side rails adjusted as appropriate  - Keep care items and personal belongings within reach  - Initiate and maintain comfort rounds  - Make Fall Risk Sign visible to staff  - Offer Toileting every 2 Hours, in advance of need  - Apply yellow socks and bracelet for high fall risk patients  - Consider moving patient to room near nurses station  Outcome: Progressing  Goal: Maintain or return to baseline ADL function  Description: INTERVENTIONS:  -  Assess patient's ability to carry out ADLs; assess patient's baseline for ADL function and identify physical deficits which impact ability to perform ADLs (bathing, care of mouth/teeth, toileting, grooming, dressing, etc )  - Assess/evaluate cause of self-care deficits   - Assess range of motion  - Assess patient's mobility; develop plan if impaired  - Assess patient's need for assistive devices and provide as appropriate  - Encourage maximum independence but intervene and supervise when necessary  - Involve family in performance of ADLs  - Assess for home care needs following discharge   - Consider OT consult to assist with ADL evaluation and planning for discharge  - Provide patient education as appropriate  Outcome: Progressing  Goal: Maintains/Returns to pre admission functional level  Description: INTERVENTIONS:  - Perform BMAT or MOVE assessment daily    - Set and communicate daily mobility goal to care team and patient/family/caregiver  - Collaborate with rehabilitation services on mobility goals if consulted  - Perform Range of Motion 3 times a day  - Reposition patient every 2 hours    - Dangle patient 3 times a day  - Stand patient 3 times a day  - Ambulate patient 3 times a day  - Out of bed to chair 3 times a day   - Out of bed for meals 3 times a day  - Out of bed for toileting  - Record patient progress and toleration of activity level   Outcome: Progressing     Problem: DISCHARGE PLANNING  Goal: Discharge to home or other facility with appropriate resources  Description: INTERVENTIONS:  - Identify barriers to discharge w/patient and caregiver  - Arrange for needed discharge resources and transportation as appropriate  - Identify discharge learning needs (meds, wound care, etc )  - Arrange for interpretive services to assist at discharge as needed  - Refer to Case Management Department for coordinating discharge planning if the patient needs post-hospital services based on physician/advanced practitioner order or complex needs related to functional status, cognitive ability, or social support system  Outcome: Progressing     Problem: Prexisting or High Potential for Compromised Skin Integrity  Goal: Skin integrity is maintained or improved  Description: INTERVENTIONS:  - Identify patients at risk for skin breakdown  - Assess and monitor skin integrity  - Assess and monitor nutrition and hydration status  - Monitor labs   - Assess for incontinence   - Turn and reposition patient  - Assist with mobility/ambulation  - Relieve pressure over bony prominences  - Avoid friction and shearing  - Provide appropriate hygiene as needed including keeping skin clean and dry  - Evaluate need for skin moisturizer/barrier cream  - Collaborate with interdisciplinary team   - Patient/family teaching  - Consider wound care consult   Outcome: Progressing     Problem: Nutrition/Hydration-ADULT  Goal: Nutrient/Hydration intake appropriate for improving, restoring or maintaining nutritional needs  Description: Monitor and assess patient's nutrition/hydration status for malnutrition  Collaborate with interdisciplinary team and initiate plan and interventions as ordered  Monitor patient's weight and dietary intake as ordered or per policy  Utilize nutrition screening tool and intervene as necessary  Determine patient's food preferences and provide high-protein, high-caloric foods as appropriate       INTERVENTIONS:  - Monitor oral intake, urinary output, labs, and treatment plans  - Assess nutrition and hydration status and recommend course of action  - Evaluate amount of meals eaten  - Assist patient with eating if necessary   - Allow adequate time for meals  - Recommend/ encourage appropriate diets, oral nutritional supplements, and vitamin/mineral supplements  - Order, calculate, and assess calorie counts as needed  - Recommend, monitor, and adjust tube feedings and TPN/PPN based on assessed needs  - Assess need for intravenous fluids  - Provide specific nutrition/hydration education as appropriate  - Include patient/family/caregiver in decisions related to nutrition  Outcome: Progressing     Problem: MOBILITY - ADULT  Goal: Maintain or return to baseline ADL function  Description: INTERVENTIONS:  -  Assess patient's ability to carry out ADLs; assess patient's baseline for ADL function and identify physical deficits which impact ability to perform ADLs (bathing, care of mouth/teeth, toileting, grooming, dressing, etc )  - Assess/evaluate cause of self-care deficits   - Assess range of motion  - Assess patient's mobility; develop plan if impaired  - Assess patient's need for assistive devices and provide as appropriate  - Encourage maximum independence but intervene and supervise when necessary  - Involve family in performance of ADLs  - Assess for home care needs following discharge   - Consider OT consult to assist with ADL evaluation and planning for discharge  - Provide patient education as appropriate  Outcome: Progressing

## 2023-03-05 NOTE — QUICK NOTE
Rehab dx: Debility (Non-cardiac/Non-pulmonary)    Patient seen face to face  No acute issues overnight  Patient reports lightheadedness while working with therapy, which quickly resolved  /72 obtained earlier during therapy to 106/68 when patient experienced lightheadedness  Patient reports quick recovery with rest and was able to perform remainder of therapy  No other concerns at this time      24 hr output  Ileostomy:  300 cc  Cholecystostomy: 20 cc    GAURANG Medina  [facility]

## 2023-03-05 NOTE — PLAN OF CARE
Problem: PAIN - ADULT  Goal: Verbalizes/displays adequate comfort level or baseline comfort level  Description: Interventions:  - Encourage patient to monitor pain and request assistance  - Assess pain using appropriate pain scale  - Administer analgesics based on type and severity of pain and evaluate response  - Implement non-pharmacological measures as appropriate and evaluate response  - Consider cultural and social influences on pain and pain management  - Notify physician/advanced practitioner if interventions unsuccessful or patient reports new pain  Outcome: Progressing     Problem: INFECTION - ADULT  Goal: Absence or prevention of progression during hospitalization  Description: INTERVENTIONS:  - Assess and monitor for signs and symptoms of infection  - Monitor lab/diagnostic results  - Monitor all insertion sites, i e  indwelling lines, tubes, and drains  - Monitor endotracheal if appropriate and nasal secretions for changes in amount and color  - [place] appropriate cooling/warming therapies per order  - Administer medications as ordered  - Instruct and encourage patient and family to use good hand hygiene technique  - Identify and instruct in appropriate isolation precautions for identified infection/condition  Outcome: Progressing  Goal: Absence of fever/infection during neutropenic period  Description: INTERVENTIONS:  - Monitor WBC    Outcome: Progressing     Problem: SAFETY ADULT  Goal: Patient will remain free of falls  Description: INTERVENTIONS:  - Educate patient/family on patient safety including physical limitations  - Instruct patient to call for assistance with activity   - Consult OT/PT to assist with strengthening/mobility   - Keep Call bell within reach  - Keep bed low and locked with side rails adjusted as appropriate  - Keep care items and personal belongings within reach  - Initiate and maintain comfort rounds  - Make Fall Risk Sign visible to staff  - Offer Toileting every Hours, in advance of need  - Initiate/Maintain alarm  - Obtain necessary fall risk management equipment:   - Apply yellow socks and bracelet for high fall risk patients  - Consider moving patient to room near nurses station  Outcome: Progressing  Goal: Maintain or return to baseline ADL function  Description: INTERVENTIONS:  -  Assess patient's ability to carry out ADLs; assess patient's baseline for ADL function and identify physical deficits which impact ability to perform ADLs (bathing, care of mouth/teeth, toileting, grooming, dressing, etc )  - Assess/evaluate cause of self-care deficits   - Assess range of motion  - Assess patient's mobility; develop plan if impaired  - Assess patient's need for assistive devices and provide as appropriate  - Encourage maximum independence but intervene and supervise when necessary  - Involve family in performance of ADLs  - Assess for home care needs following discharge   - Consider OT consult to assist with ADL evaluation and planning for discharge  - Provide patient education as appropriate  Outcome: Progressing  Goal: Maintains/Returns to pre admission functional level  Description: INTERVENTIONS:  - Perform BMAT or MOVE assessment daily    - Set and communicate daily mobility goal to care team and patient/family/caregiver  - Collaborate with rehabilitation services on mobility goals if consulted  - Perform Range of Motion  times a day  - Reposition patient every  hours    - Dangle patient  times a day  - Stand patient times a day  - Ambulate patient  times a day  - Out of bed to chair times a day   - Out of bed for meals  times a day  - Out of bed for toileting  - Record patient progress and toleration of activity level   Outcome: Progressing     Problem: DISCHARGE PLANNING  Goal: Discharge to home or other facility with appropriate resources  Description: INTERVENTIONS:  - Identify barriers to discharge w/patient and caregiver  - Arrange for needed discharge resources and transportation as appropriate  - Identify discharge learning needs (meds, wound care, etc )  - Arrange for interpretive services to assist at discharge as needed  - Refer to Case Management Department for coordinating discharge planning if the patient needs post-hospital services based on physician/advanced practitioner order or complex needs related to functional status, cognitive ability, or social support system  Outcome: Progressing     Problem: Prexisting or High Potential for Compromised Skin Integrity  Goal: Skin integrity is maintained or improved  Description: INTERVENTIONS:  - Identify patients at risk for skin breakdown  - Assess and monitor skin integrity  - Assess and monitor nutrition and hydration status  - Monitor labs   - Assess for incontinence   - Turn and reposition patient  - Assist with mobility/ambulation  - Relieve pressure over bony prominences  - Avoid friction and shearing  - Provide appropriate hygiene as needed including keeping skin clean and dry  - Evaluate need for skin moisturizer/barrier cream  - Collaborate with interdisciplinary team   - Patient/family teaching  - Consider wound care consult   Outcome: Progressing     Problem: Nutrition/Hydration-ADULT  Goal: Nutrient/Hydration intake appropriate for improving, restoring or maintaining nutritional needs  Description: Monitor and assess patient's nutrition/hydration status for malnutrition  Collaborate with interdisciplinary team and initiate plan and interventions as ordered  Monitor patient's weight and dietary intake as ordered or per policy  Utilize nutrition screening tool and intervene as necessary  Determine patient's food preferences and provide high-protein, high-caloric foods as appropriate       INTERVENTIONS:  - Monitor oral intake, urinary output, labs, and treatment plans  - Assess nutrition and hydration status and recommend course of action  - Evaluate amount of meals eaten  - Assist patient with eating if necessary - Allow adequate time for meals  - Recommend/ encourage appropriate diets, oral nutritional supplements, and vitamin/mineral supplements  - Order, calculate, and assess calorie counts as needed  - Recommend, monitor, and adjust tube feedings and TPN/PPN based on assessed needs  - Assess need for intravenous fluids  - Provide specific nutrition/hydration education as appropriate  - Include patient/family/caregiver in decisions related to nutrition  Outcome: Progressing     Problem: MOBILITY - ADULT  Goal: Maintain or return to baseline ADL function  Description: INTERVENTIONS:  -  Assess patient's ability to carry out ADLs; assess patient's baseline for ADL function and identify physical deficits which impact ability to perform ADLs (bathing, care of mouth/teeth, toileting, grooming, dressing, etc )  - Assess/evaluate cause of self-care deficits   - Assess range of motion  - Assess patient's mobility; develop plan if impaired  - Assess patient's need for assistive devices and provide as appropriate  - Encourage maximum independence but intervene and supervise when necessary  - Involve family in performance of ADLs  - Assess for home care needs following discharge   - Consider OT consult to assist with ADL evaluation and planning for discharge  - Provide patient education as appropriate  Outcome: Progressing  Goal: Maintains/Returns to pre admission functional level  Description: INTERVENTIONS:  - Perform BMAT or MOVE assessment daily    - Set and communicate daily mobility goal to care team and patient/family/caregiver  - Collaborate with rehabilitation services on mobility goals if consulted  - Perform Range of Motion  times a day  - Reposition patient every  hours    - Dangle patient  times a day  - Stand patient  times a day  - Ambulate patient times a day  - Out of bed to chair  times a day   - Out of bed for meals times a day  - Out of bed for toileting  - Record patient progress and toleration of activity level   Outcome: Progressing

## 2023-03-06 LAB
ABO GROUP BLD: NORMAL
ALBUMIN SERPL BCP-MCNC: 1.1 G/DL (ref 3.5–5)
ALBUMIN SERPL BCP-MCNC: 1.2 G/DL (ref 3.5–5)
ALP SERPL-CCNC: 934 U/L (ref 46–116)
ALP SERPL-CCNC: 965 U/L (ref 46–116)
ALT SERPL W P-5'-P-CCNC: 12 U/L (ref 12–78)
ALT SERPL W P-5'-P-CCNC: 13 U/L (ref 12–78)
ANION GAP SERPL CALCULATED.3IONS-SCNC: 8 MMOL/L (ref 4–13)
ANION GAP SERPL CALCULATED.3IONS-SCNC: 8 MMOL/L (ref 4–13)
AST SERPL W P-5'-P-CCNC: 79 U/L (ref 5–45)
AST SERPL W P-5'-P-CCNC: 82 U/L (ref 5–45)
BASOPHILS # BLD AUTO: 0.05 THOUSANDS/ÂΜL (ref 0–0.1)
BASOPHILS # BLD AUTO: 0.07 THOUSANDS/ÂΜL (ref 0–0.1)
BASOPHILS NFR BLD AUTO: 0 % (ref 0–1)
BASOPHILS NFR BLD AUTO: 0 % (ref 0–1)
BILIRUB SERPL-MCNC: 1.41 MG/DL (ref 0.2–1)
BILIRUB SERPL-MCNC: 1.43 MG/DL (ref 0.2–1)
BLD GP AB SCN SERPL QL: NEGATIVE
BUN SERPL-MCNC: 26 MG/DL (ref 5–25)
BUN SERPL-MCNC: 26 MG/DL (ref 5–25)
CALCIUM ALBUM COR SERPL-MCNC: 10.3 MG/DL (ref 8.3–10.1)
CALCIUM ALBUM COR SERPL-MCNC: 10.4 MG/DL (ref 8.3–10.1)
CALCIUM SERPL-MCNC: 8.1 MG/DL (ref 8.3–10.1)
CALCIUM SERPL-MCNC: 8.1 MG/DL (ref 8.3–10.1)
CHLORIDE SERPL-SCNC: 102 MMOL/L (ref 96–108)
CHLORIDE SERPL-SCNC: 102 MMOL/L (ref 96–108)
CO2 SERPL-SCNC: 18 MMOL/L (ref 21–32)
CO2 SERPL-SCNC: 18 MMOL/L (ref 21–32)
CREAT SERPL-MCNC: 1.57 MG/DL (ref 0.6–1.3)
CREAT SERPL-MCNC: 1.66 MG/DL (ref 0.6–1.3)
EOSINOPHIL # BLD AUTO: 0 THOUSAND/ÂΜL (ref 0–0.61)
EOSINOPHIL # BLD AUTO: 0 THOUSAND/ÂΜL (ref 0–0.61)
EOSINOPHIL NFR BLD AUTO: 0 % (ref 0–6)
EOSINOPHIL NFR BLD AUTO: 0 % (ref 0–6)
ERYTHROCYTE [DISTWIDTH] IN BLOOD BY AUTOMATED COUNT: 17 % (ref 11.6–15.1)
ERYTHROCYTE [DISTWIDTH] IN BLOOD BY AUTOMATED COUNT: 17.2 % (ref 11.6–15.1)
GFR SERPL CREATININE-BSD FRML MDRD: 44 ML/MIN/1.73SQ M
GFR SERPL CREATININE-BSD FRML MDRD: 47 ML/MIN/1.73SQ M
GLUCOSE P FAST SERPL-MCNC: 107 MG/DL (ref 65–99)
GLUCOSE SERPL-MCNC: 101 MG/DL (ref 65–140)
GLUCOSE SERPL-MCNC: 107 MG/DL (ref 65–140)
GLUCOSE SERPL-MCNC: 153 MG/DL (ref 65–140)
GLUCOSE SERPL-MCNC: 166 MG/DL (ref 65–140)
GLUCOSE SERPL-MCNC: 183 MG/DL (ref 65–140)
GLUCOSE SERPL-MCNC: 232 MG/DL (ref 65–140)
HCT VFR BLD AUTO: 24.4 % (ref 36.5–49.3)
HCT VFR BLD AUTO: 24.8 % (ref 36.5–49.3)
HGB BLD-MCNC: 7.5 G/DL (ref 12–17)
HGB BLD-MCNC: 7.7 G/DL (ref 12–17)
IMM GRANULOCYTES # BLD AUTO: 0.23 THOUSAND/UL (ref 0–0.2)
IMM GRANULOCYTES # BLD AUTO: 0.25 THOUSAND/UL (ref 0–0.2)
IMM GRANULOCYTES NFR BLD AUTO: 1 % (ref 0–2)
IMM GRANULOCYTES NFR BLD AUTO: 2 % (ref 0–2)
LYMPHOCYTES # BLD AUTO: 0.76 THOUSANDS/ÂΜL (ref 0.6–4.47)
LYMPHOCYTES # BLD AUTO: 1.2 THOUSANDS/ÂΜL (ref 0.6–4.47)
LYMPHOCYTES NFR BLD AUTO: 5 % (ref 14–44)
LYMPHOCYTES NFR BLD AUTO: 8 % (ref 14–44)
MCH RBC QN AUTO: 25.8 PG (ref 26.8–34.3)
MCH RBC QN AUTO: 25.9 PG (ref 26.8–34.3)
MCHC RBC AUTO-ENTMCNC: 30.7 G/DL (ref 31.4–37.4)
MCHC RBC AUTO-ENTMCNC: 31 G/DL (ref 31.4–37.4)
MCV RBC AUTO: 84 FL (ref 82–98)
MCV RBC AUTO: 84 FL (ref 82–98)
MONOCYTES # BLD AUTO: 1.6 THOUSAND/ÂΜL (ref 0.17–1.22)
MONOCYTES # BLD AUTO: 1.79 THOUSAND/ÂΜL (ref 0.17–1.22)
MONOCYTES NFR BLD AUTO: 10 % (ref 4–12)
MONOCYTES NFR BLD AUTO: 11 % (ref 4–12)
NEUTROPHILS # BLD AUTO: 12.64 THOUSANDS/ÂΜL (ref 1.85–7.62)
NEUTROPHILS # BLD AUTO: 13.17 THOUSANDS/ÂΜL (ref 1.85–7.62)
NEUTS SEG NFR BLD AUTO: 80 % (ref 43–75)
NEUTS SEG NFR BLD AUTO: 83 % (ref 43–75)
NRBC BLD AUTO-RTO: 0 /100 WBCS
NRBC BLD AUTO-RTO: 0 /100 WBCS
PLATELET # BLD AUTO: 338 THOUSANDS/UL (ref 149–390)
PLATELET # BLD AUTO: 352 THOUSANDS/UL (ref 149–390)
PMV BLD AUTO: 10.3 FL (ref 8.9–12.7)
PMV BLD AUTO: 9.9 FL (ref 8.9–12.7)
POTASSIUM SERPL-SCNC: 3.8 MMOL/L (ref 3.5–5.3)
POTASSIUM SERPL-SCNC: 4.1 MMOL/L (ref 3.5–5.3)
PROT SERPL-MCNC: 6.4 G/DL (ref 6.4–8.4)
PROT SERPL-MCNC: 6.6 G/DL (ref 6.4–8.4)
RBC # BLD AUTO: 2.91 MILLION/UL (ref 3.88–5.62)
RBC # BLD AUTO: 2.97 MILLION/UL (ref 3.88–5.62)
RH BLD: POSITIVE
SODIUM SERPL-SCNC: 128 MMOL/L (ref 135–147)
SODIUM SERPL-SCNC: 128 MMOL/L (ref 135–147)
SPECIMEN EXPIRATION DATE: NORMAL
WBC # BLD AUTO: 15.85 THOUSAND/UL (ref 4.31–10.16)
WBC # BLD AUTO: 15.91 THOUSAND/UL (ref 4.31–10.16)

## 2023-03-06 RX ORDER — SODIUM CHLORIDE 9 MG/ML
75 INJECTION, SOLUTION INTRAVENOUS ONCE
Status: COMPLETED | OUTPATIENT
Start: 2023-03-06 | End: 2023-03-07

## 2023-03-06 RX ADMIN — SODIUM CHLORIDE 2 G: 1 TABLET ORAL at 17:06

## 2023-03-06 RX ADMIN — OXYCODONE HYDROCHLORIDE 5 MG: 5 TABLET ORAL at 21:46

## 2023-03-06 RX ADMIN — INSULIN LISPRO 1 UNITS: 100 INJECTION, SOLUTION INTRAVENOUS; SUBCUTANEOUS at 21:47

## 2023-03-06 RX ADMIN — PANTOPRAZOLE SODIUM 40 MG: 40 TABLET, DELAYED RELEASE ORAL at 16:58

## 2023-03-06 RX ADMIN — SODIUM CHLORIDE 75 ML/HR: 0.9 INJECTION, SOLUTION INTRAVENOUS at 11:44

## 2023-03-06 RX ADMIN — SIMETHICONE 80 MG: 80 TABLET, CHEWABLE ORAL at 12:02

## 2023-03-06 RX ADMIN — HYDROXYZINE HYDROCHLORIDE 10 MG: 10 TABLET ORAL at 21:46

## 2023-03-06 RX ADMIN — SODIUM CHLORIDE 2 G: 1 TABLET ORAL at 09:50

## 2023-03-06 RX ADMIN — ONDANSETRON 4 MG: 2 INJECTION INTRAMUSCULAR; INTRAVENOUS at 09:57

## 2023-03-06 RX ADMIN — INSULIN LISPRO 1 UNITS: 100 INJECTION, SOLUTION INTRAVENOUS; SUBCUTANEOUS at 17:06

## 2023-03-06 RX ADMIN — DULOXETINE HYDROCHLORIDE 30 MG: 30 CAPSULE, DELAYED RELEASE ORAL at 09:46

## 2023-03-06 RX ADMIN — OXYCODONE HYDROCHLORIDE 5 MG: 5 TABLET ORAL at 12:56

## 2023-03-06 RX ADMIN — INSULIN LISPRO 1 UNITS: 100 INJECTION, SOLUTION INTRAVENOUS; SUBCUTANEOUS at 12:00

## 2023-03-06 RX ADMIN — INSULIN LISPRO 5 UNITS: 100 INJECTION, SOLUTION INTRAVENOUS; SUBCUTANEOUS at 09:50

## 2023-03-06 RX ADMIN — SIMETHICONE 80 MG: 80 TABLET, CHEWABLE ORAL at 16:58

## 2023-03-06 RX ADMIN — COLLAGENASE SANTYL 1 APPLICATION.: 250 OINTMENT TOPICAL at 09:50

## 2023-03-06 RX ADMIN — CARBIDOPA AND LEVODOPA 2.5 MG: 50; 200 TABLET, EXTENDED RELEASE ORAL at 06:19

## 2023-03-06 RX ADMIN — ENOXAPARIN SODIUM 40 MG: 40 INJECTION SUBCUTANEOUS at 09:46

## 2023-03-06 RX ADMIN — CARBIDOPA AND LEVODOPA 2.5 MG: 50; 200 TABLET, EXTENDED RELEASE ORAL at 10:01

## 2023-03-06 RX ADMIN — ATORVASTATIN CALCIUM 40 MG: 40 TABLET, FILM COATED ORAL at 09:46

## 2023-03-06 RX ADMIN — SIMETHICONE 80 MG: 80 TABLET, CHEWABLE ORAL at 21:46

## 2023-03-06 RX ADMIN — ASPIRIN 81 MG CHEWABLE TABLET 81 MG: 81 TABLET CHEWABLE at 09:46

## 2023-03-06 RX ADMIN — PANTOPRAZOLE SODIUM 40 MG: 40 TABLET, DELAYED RELEASE ORAL at 06:20

## 2023-03-06 RX ADMIN — TAMSULOSIN HYDROCHLORIDE 0.4 MG: 0.4 CAPSULE ORAL at 16:58

## 2023-03-06 RX ADMIN — INSULIN LISPRO 5 UNITS: 100 INJECTION, SOLUTION INTRAVENOUS; SUBCUTANEOUS at 12:01

## 2023-03-06 RX ADMIN — INSULIN GLARGINE 5 UNITS: 100 INJECTION, SOLUTION SUBCUTANEOUS at 21:47

## 2023-03-06 RX ADMIN — SIMETHICONE 80 MG: 80 TABLET, CHEWABLE ORAL at 09:45

## 2023-03-06 NOTE — PLAN OF CARE
Problem: PAIN - ADULT  Goal: Verbalizes/displays adequate comfort level or baseline comfort level  Description: Interventions:  - Encourage patient to monitor pain and request assistance  - Assess pain using appropriate pain scale  - Administer analgesics based on type and severity of pain and evaluate response  - Implement non-pharmacological measures as appropriate and evaluate response  - Consider cultural and social influences on pain and pain management  - Notify physician/advanced practitioner if interventions unsuccessful or patient reports new pain  Outcome: Progressing     Problem: INFECTION - ADULT  Goal: Absence or prevention of progression during hospitalization  Description: INTERVENTIONS:  - Assess and monitor for signs and symptoms of infection  - Monitor lab/diagnostic results  - Monitor all insertion sites, i e  indwelling lines, tubes, and drains  - Monitor endotracheal if appropriate and nasal secretions for changes in amount and color  - [place] appropriate cooling/warming therapies per order  - Administer medications as ordered  - Instruct and encourage patient and family to use good hand hygiene technique  - Identify and instruct in appropriate isolation precautions for identified infection/condition  Outcome: Progressing  Goal: Absence of fever/infection during neutropenic period  Description: INTERVENTIONS:  - Monitor WBC    Outcome: Progressing     Problem: SAFETY ADULT  Goal: Patient will remain free of falls  Description: INTERVENTIONS:  - Educate patient/family on patient safety including physical limitations  - Instruct patient to call for assistance with activity   - Consult OT/PT to assist with strengthening/mobility   - Keep Call bell within reach  - Keep bed low and locked with side rails adjusted as appropriate  - Keep care items and personal belongings within reach  - Initiate and maintain comfort rounds  - Make Fall Risk Sign visible to staff  - Offer Toileting every 2 Hours, in advance of need  - Initiate/Maintain bed alarm  - Obtain necessary fall risk management equipment: bed alarm  - Apply yellow socks and bracelet for high fall risk patients  - Consider moving patient to room near nurses station  Outcome: Progressing  Goal: Maintain or return to baseline ADL function  Description: INTERVENTIONS:  -  Assess patient's ability to carry out ADLs; assess patient's baseline for ADL function and identify physical deficits which impact ability to perform ADLs (bathing, care of mouth/teeth, toileting, grooming, dressing, etc )  - Assess/evaluate cause of self-care deficits   - Assess range of motion  - Assess patient's mobility; develop plan if impaired  - Assess patient's need for assistive devices and provide as appropriate  - Encourage maximum independence but intervene and supervise when necessary  - Involve family in performance of ADLs  - Assess for home care needs following discharge   - Consider OT consult to assist with ADL evaluation and planning for discharge  - Provide patient education as appropriate  Outcome: Progressing  Goal: Maintains/Returns to pre admission functional level  Description: INTERVENTIONS:  - Perform BMAT or MOVE assessment daily    - Set and communicate daily mobility goal to care team and patient/family/caregiver  - Collaborate with rehabilitation services on mobility goals if consulted  - Perform Range of Motion 3 times a day  - Reposition patient every 2 hours    - Dangle patient 3 times a day  - Stand patient 3 times a day  - Ambulate patient 3 times a day  - Out of bed to chair 3 times a day   - Out of bed for meals 3 times a day  - Out of bed for toileting  - Record patient progress and toleration of activity level   Outcome: Progressing     Problem: DISCHARGE PLANNING  Goal: Discharge to home or other facility with appropriate resources  Description: INTERVENTIONS:  - Identify barriers to discharge w/patient and caregiver  - Arrange for needed discharge resources and transportation as appropriate  - Identify discharge learning needs (meds, wound care, etc )  - Arrange for interpretive services to assist at discharge as needed  - Refer to Case Management Department for coordinating discharge planning if the patient needs post-hospital services based on physician/advanced practitioner order or complex needs related to functional status, cognitive ability, or social support system  Outcome: Progressing     Problem: Prexisting or High Potential for Compromised Skin Integrity  Goal: Skin integrity is maintained or improved  Description: INTERVENTIONS:  - Identify patients at risk for skin breakdown  - Assess and monitor skin integrity  - Assess and monitor nutrition and hydration status  - Monitor labs   - Assess for incontinence   - Turn and reposition patient  - Assist with mobility/ambulation  - Relieve pressure over bony prominences  - Avoid friction and shearing  - Provide appropriate hygiene as needed including keeping skin clean and dry  - Evaluate need for skin moisturizer/barrier cream  - Collaborate with interdisciplinary team   - Patient/family teaching  - Consider wound care consult   Outcome: Progressing     Problem: Nutrition/Hydration-ADULT  Goal: Nutrient/Hydration intake appropriate for improving, restoring or maintaining nutritional needs  Description: Monitor and assess patient's nutrition/hydration status for malnutrition  Collaborate with interdisciplinary team and initiate plan and interventions as ordered  Monitor patient's weight and dietary intake as ordered or per policy  Utilize nutrition screening tool and intervene as necessary  Determine patient's food preferences and provide high-protein, high-caloric foods as appropriate       INTERVENTIONS:  - Monitor oral intake, urinary output, labs, and treatment plans  - Assess nutrition and hydration status and recommend course of action  - Evaluate amount of meals eaten  - Assist patient with eating if necessary   - Allow adequate time for meals  - Recommend/ encourage appropriate diets, oral nutritional supplements, and vitamin/mineral supplements  - Order, calculate, and assess calorie counts as needed  - Recommend, monitor, and adjust tube feedings and TPN/PPN based on assessed needs  - Assess need for intravenous fluids  - Provide specific nutrition/hydration education as appropriate  - Include patient/family/caregiver in decisions related to nutrition  Outcome: Progressing     Problem: MOBILITY - ADULT  Goal: Maintain or return to baseline ADL function  Description: INTERVENTIONS:  -  Assess patient's ability to carry out ADLs; assess patient's baseline for ADL function and identify physical deficits which impact ability to perform ADLs (bathing, care of mouth/teeth, toileting, grooming, dressing, etc )  - Assess/evaluate cause of self-care deficits   - Assess range of motion  - Assess patient's mobility; develop plan if impaired  - Assess patient's need for assistive devices and provide as appropriate  - Encourage maximum independence but intervene and supervise when necessary  - Involve family in performance of ADLs  - Assess for home care needs following discharge   - Consider OT consult to assist with ADL evaluation and planning for discharge  - Provide patient education as appropriate  Outcome: Progressing  Goal: Maintains/Returns to pre admission functional level  Description: INTERVENTIONS:  - Perform BMAT or MOVE assessment daily    - Set and communicate daily mobility goal to care team and patient/family/caregiver  - Collaborate with rehabilitation services on mobility goals if consulted  - Perform Range of Motion 3 times a day  - Reposition patient every 2 hours    - Dangle patient 3 times a day  - Stand patient 3 times a day  - Ambulate patient 3 times a day  - Out of bed to chair 3 times a day   - Out of bed for meals 3 times a day  - Out of bed for toileting  - Record patient progress and toleration of activity level   Outcome: Progressing

## 2023-03-06 NOTE — PROGRESS NOTES
PM&R PROGRESS NOTE:  [address] [facility] 61 y o  male MRN: [id number]  Unit/Bed#: -19 Encounter: [id number]        Rehabilitation Diagnosis: Impairment of mobility, safety and Activities of Daily Living (ADLs) due to Debility:  16  Debility (Non-cardiac/Non-pulmonary)    HPI: Edson Ahumada is a 61 y o  male with a history of hyperlipidemia, hypertension, GERD, ventral hernia, colon adenocarcinoma that initially presented to [facility] for an elective surgical procedure with Dr Brynn Chaney on 11/7/22  Patient presented to hem/onc on  9/22 when CT abdomen revealed transverse colonic apple core lesion and innumerable hepatic metastases  MRI revealed multiple hepati metastasis with smaller lesions in left lobe and larger lesions on the right lobe  On 11/7, patient underwent exploratory laparotomy, segment 3 liver resection, ablation, intraoperative ultrasound of the liver  This was complicated by left upper quadrant hematoma, imaging showed suspected leak from transverse colon anastomosis  On 11/16, patient underwent exploratory lap revealing hematoma, defect in transverse colon anastomosis with extravasation of succus and a dilated cecum requiring resection  He had a right hemicolectomy, left in discontinuity and temporary abdominal closure device  On 11/17, patient underwent re-exploratory, partial descending colectomy, primary colonic anastomosis created with diverting loop ileostomy and midline wound vac placement  An intra-abdominal abscess was positive for ESBL and patient completed prolonged course of antibiotics  He was discharged to SNF on 12/3 and returned to hospital on 12/4 for increased abdominal pain  Patient monitored off of antibiotics  On 12/6 patient was noted with an increased creatinine level, received IVF bolus with improvement  Patient developed tachycardia, increased abdominal pain, and incision with yellow drainage noted  CT AP showed abdominal abscess and distended gallbladder   ID was consulted, patient was continued on IV antibiotics through 12/15  Patient went to IR for percutaneous cholecystostomy tube insertion and hepatectomy abscess drainage  Nephrology consulted and initiated sodium bicarb gtt and IV albumin  On 12/14, patient midline/left chest wall pain, below abscess drain  Cardiac work-up completed  Troponin level 57 and CXR showed progressive bilateral opacities suspicious for CHF and small left pleural effusion  Cariology consulted with chest pain appearing musculoskeletal no further work up warranted  Patient was deemed medically stable and admitted to [address] [place] [address] on 12/14/22  He was discharged from [facility] on 01/17/23  Patient present to [facility] on 02/07 with increased weakness for two weeks with 2 falls noted  No injury noted  He was found to be septic with the main source of midline abdominal wound which was with foul smelling drainage  CT showed improvement in fluid collection at the left liver segment, perisplenic, left retroperitoneum, interior to the spleen  Patient was treated with broad spectrum antibiotics and eventually placed on Ertapenem  Hospital course complicated by fevers and leukocytosis  Repeat imaging concerning for cholecystitis  Patient underwent cystostomy tube exchange and Pattock drain was removed on 02/24  The patient was evaluated by the Rehabilitation team and deemed an appropriate candidate for an inpatient acute rehabilitation program  Patient was admitted on 02/24/23  SUBJECTIVE: Patient seen face to face  No acute issues overnight  Patient slept well, denies pain, reports ongoing abdominal tenderness, tolerable  Good appetite  Ileostomy functioning  Visualized abdominal wound, pink with scattered areas of slough  Denies chest pain, shortness of breath, nausea, fever, chills, abdominal pain       24 hr total  Cholecystostomy tube-  0 cc documented, per patient emptied yesterday  Ileostomy- 650 cc     ASSESSMENT: Stable, progressing    PLAN:  - WBC 15 85, Hbg 7 7, Na+ 128, creatinine 1 66, total bilirubin, AST/ALT/Alk Phos elevated  Appreciate IM co-management  - GI consulted, deferred to IR tube check  - repeated am labs, results unchanged, type&cross completed  - normal saline IVF 75 ml/hr 1L  - repeat CBC, CMP 3/7 morning  - IR consulted for cholecystostomy tube check  - continue with current rehabilitation plan of care  Rehabilitation    • Functional deficits:  Mobility, self care  • Continue current rehabilitation plan of care to maximize function  • Functional update:   • PT: mobility- independent, transfers- supervision, ambulation- min A, 75' RW   • OT: ADL-shower-incidental touching, UB set-up, LB supervision  • Estimated Discharge: anticipated 3/10 home with homecare    Pain  • Tylenol, oxycodone    DVT prophylaxis  • Lovenox    Bladder plan  • Continent    Bowel plan  • Ileostomy    Malignant neoplasm of transverse colon [facility])  Assessment & Plan  Complex history with chronological details below:    2/22: S/P diverting colostomy, liver biopsy +Chemo tx  11/7/22: Hepatic resection and colostomy reversal  11/16/22: Exploratory laparotomy with bowel resection and VAC placement   11/17/22: Right hemicolectomy, partial descending colon resection, colocolonic anastomosis with loop ileosotmy and midline VAC placement  12/20/22: Drains placed, total of 3 drains + perc rdaha tube + ileostomy  1/31/23: 2 of 3 drains removed  This hospital stay had fever/leukocytosis/sepsis, started Meropenem and then to Vancomycin and Ertapenem  Wound cx again = Ecoli ESBL  LD Ertapenem 2/14, Vancomycin 2/16 2/24: Abscessogram of midline drain showed minimal persistent collection and the midline catheter was removed   Perc Radha tube exchanged  03/06- pending IR cholecystostomy tube check    • Patient does not wish to follow with [facility] anymore as it is too far of a drive; would like to establish with [facility] oncology (already follows with   Hardeep)  • Follows with Dr Sara Hill as outpt      * Sepsis [facility])  Assessment & Plan  Noted the development of foul-smelling drainage and increasing pain from his chronic abdominal wound with leukocytosis, tachypnea, tachycardia  • Purulent drainage from abdominal wound  • Hx of colon CA w/ extensive abd surgical- adenocarcinoma of transverse colon section  • Patient has several abdominal drains in place, with CT showing improvement in the fluid collections at the left liver lobe, perisplenic area, and the left retroperitoneum inferior to the spleen  • Completed Vancomycin and Ertapenem  • S/p bedside midline wound debridement + wet dressing 2/8 with general surgery  • S/p IR tube check 2/17/2023, drains kept in place  Discussed with IR, follow up in 1 month  • Given ongoing low grade fever and worsening leukocytosis, ordered repeat blood cultures and repeat CT a/p    CT a/p showing moderate R pleural effusion, imaging concerning for cholecystitis  Discussed case with surgery, IR  IR will reposition drain today- Avlina tube check showed retraction of the alvina tube   Interval occlusion of the cystic duct   Dino-enteric fistula persisted  Exchange of alvina tube 2/24  • Continue to monitor off of antibiotics, low threshold to re-consult ID, but stable at this time  • Debility from Sepsis in addition to 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  • Pain control, anxiety management  • PT/OT  • 03/06 WBC 15 85, Hbg 7 7, Na+ 128, creatinine 1 66, total bilirubin, AST/ALT/Alk Phos elevated  • Consulted GI, deferred to IR for tube check  • IVF NS 1L  • Repeat labs 03/07    Chronic bilateral pleural effusions  Assessment & Plan  Hx of pleural effusions with   Interval worsening of moderate right and small left pleural effusions  There is associated bibasilar atelectasis based on CTAP on 2/22    Monitor oxygen and breathing, may benefit from thoracentesis    Depression  Assessment & Plan  Continue Cymbalta  Provide supportive counseling and encouragement, easily tearful  Neuropsychology consultation for support    Cholecystitis, unspecified  Assessment & Plan  Subacute at this point, but still requiring per radha tube  • s/p percutaneous tube placement 12/8/22 asnd recent exchange on 2/24/23  • CT scan was concerning for cholecystitis --> to IR 2/24 = showed retraction of the radha tube and interval occlusion of the cystic duct   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  Radha tube was exchanged  • Monitor output and pain    Abdominal wound dehiscence  Assessment & Plan  Midline abdominal wound with initially foul smelling drainage  Now improved per patient with red granular base and slough noted  Santyl and wet to dry dressing was done in acute care  Consult wound care   Daily dressing changes    Nephrolithiasis  Assessment & Plan  RUQ US revealed 7 mm nonobstructing right intrarenal calculus  No hydronephrosis noted on CT a/p  Asymptomatic at this time  • Flomax  • Monitor outpt, symptoms    Hyponatremia  Assessment & Plan  • Mild, most recently 129 (previous 131)   • Felt likely2/2 SIADH in setting of malignancy  • Sodium chloride tab 1 BID  • Monitor BMP    Elevated alkaline phosphatase level  Assessment & Plan  Chronically elevated likely in setting of known hepatic metastasis  • RUQ US Decompressed gallbladder around a cholecystostomy tube, limiting evaluation  Hepatomegaly  Heterogeneous echotexture of the liver in this patient with known metastatic disease  7 mm nonobstructing right intrarenal calculus  • Monitor with am CMP    Leukocytosis  Assessment & Plan  WBC 13 23 (previous 13 07, 13 41)  Mildly above normal, trendiong down, however has a history of spiking and dropping  Monitor labs however need to follow clinical exam and vitals as it may spike and drop again  Monitor off antibiotics    ID following  If Temp >101 blood cultures and consider CT A/P    Abscess  Assessment & Plan  Smaller abscesses on recent studies now with no active drains  At risk for further complications  Lower theshold for abdominal CT if develops fevers or leukocytosis    Acute on chronic kidney failure [facility])  Assessment & Plan  Lab Results   Component Value Date    EGFR 56 02/24/2023    EGFR 52 02/23/2023    EGFR 52 02/22/2023    CREATININE 1 36 (H) 02/24/2023    CREATININE 1 45 (H) 02/23/2023    CREATININE 1 45 (H) 02/22/2023     • POA with creatinine 2 52; baseline 1-1 3  • Suspecting possible multifactorial cause in setting of dehydration and sepsis  • Improving back into baseline levels, Continue to monitor on labs, minimize any relative hypotension  Avoid nephrotoxic agents  • Creatinine 1 66, IV fluids, recheck BMP tomorrow    Anemia  Assessment & Plan  Hbg 9 4 (previous 8 3)  Required transfusion on 2/16  Order type and screen, 1 unit pRBC (2/26)  Monitor CBC    Mixed hyperlipidemia  Assessment & Plan  Continue statin    Benign essential hypertension  Assessment & Plan  Monitor pressures in therapy    Type 2 diabetes mellitus without complication, with long-term current use of insulin [facility])  Assessment & Plan  Lab Results   Component Value Date    HGBA1C 8 0 (H) 09/26/2022       Recent Labs     02/24/23  0839 02/24/23  1137 02/24/23  1626 02/24/23  2040   POCGLU 106 105 152* 120     • Home:  Lantus 8U qam/Lispro 4U TID  • Here:  Lantus 5U qhs/Lispro 4U TID  • Has a DEXCOM meter and a regular meter at home  • Continue DM diet and QID Accuchecks/SSI    Appreciate IM consultants medical co-management  Labs, medications, and imaging reviewed  ROS:  Review of Systems   A 10 point review of systems was negative except for what is noted in the HPI  OBJECTIVE:   /74 (BP Location: Left arm)   Pulse 88   Temp 98 3 °F (36 8 °C) (Oral)   Resp 18   Ht 5' 9" (1 753 m)   Wt 80 1 kg (176 lb 9 4 oz)   SpO2 100%   BMI 26 08 kg/m²     Physical Exam  Constitutional:       Appearance: Normal appearance     HENT: Head: Normocephalic and atraumatic  Mouth/Throat:      Mouth: Mucous membranes are moist    Cardiovascular:      Rate and Rhythm: Regular rhythm  Tachycardia present  Pulses: Normal pulses  Pulmonary:      Effort: Pulmonary effort is normal       Breath sounds: Normal breath sounds  Abdominal:      General: Bowel sounds are normal       Palpations: Abdomen is soft  Tenderness: There is abdominal tenderness  Comments: +Ileostomy, cholecystostomy   Musculoskeletal:         General: Normal range of motion  Cervical back: Normal range of motion  Skin:     General: Skin is warm and dry  Capillary Refill: Capillary refill takes less than 2 seconds  Neurological:      Mental Status: He is alert and oriented to person, place, and time  Motor: Weakness present     Psychiatric:         Mood and Affect: Mood normal          Judgment: Judgment normal         Lab Results   Component Value Date    WBC 15 85 (H) 03/06/2023    HGB 7 7 (L) 03/06/2023    HCT 24 8 (L) 03/06/2023    MCV 84 03/06/2023     03/06/2023     Lab Results   Component Value Date    SODIUM 128 (L) 03/06/2023    K 4 1 03/06/2023     03/06/2023    CO2 18 (L) 03/06/2023    BUN 26 (H) 03/06/2023    CREATININE 1 66 (H) 03/06/2023    GLUC 232 (H) 03/06/2023    CALCIUM 8 1 (L) 03/06/2023     Lab Results   Component Value Date    INR 1 32 (H) 02/07/2023    INR 1 12 12/19/2022    INR 1 10 11/12/2022    PROTIME 16 1 (H) 02/07/2023    PROTIME 14 7 (H) 12/19/2022    PROTIME 14 4 11/12/2022       Current Facility-Administered Medications:   •  acetaminophen (TYLENOL) tablet 650 mg, 650 mg, Oral, Q4H PRN, Rosio Stroud DO, 650 mg at 03/01/23 2158  •  aspirin chewable tablet 81 mg, 81 mg, Oral, Daily, Rosio Stroud DO, 81 mg at 03/06/23 0946  •  atorvastatin (LIPITOR) tablet 40 mg, 40 mg, Oral, Daily, Rosio Stroud DO, 40 mg at 03/06/23 0946  •  calcium carbonate (TUMS) chewable tablet 500 mg, 500 mg, Oral, TID PRN, GAURANG Zamora, 500 mg at 03/01/23 1715  •  collagenase (SANTYL) ointment, , Topical, Daily, Leveda Siobhan, DO, 1 application   at 03/06/23 0950  •  DULoxetine (CYMBALTA) delayed release capsule 30 mg, 30 mg, Oral, Daily, Leveda Siobhan, DO, 30 mg at 03/06/23 0946  •  enoxaparin (LOVENOX) subcutaneous injection 40 mg, 40 mg, Subcutaneous, Q24H [facility], GAURANG, 40 mg at 03/06/23 0946  •  hydrOXYzine HCL (ATARAX) tablet 10 mg, 10 mg, Oral, HS, Leveda Siobhan, DO, 10 mg at 03/05/23 2113  •  insulin glargine (LANTUS) subcutaneous injection 5 Units 0 05 mL, 5 Units, Subcutaneous, HS, Leveda Siobhan, DO, 5 Units at 03/05/23 2113  •  insulin lispro (HumaLOG) 100 units/mL subcutaneous injection 1-6 Units, 1-6 Units, Subcutaneous, TID AC, 1 Units at 03/06/23 1200 **AND** Fingerstick Glucose (POCT), , , 4x Daily AC and at bedtime, Leveda Siobhan, DO  •  insulin lispro (HumaLOG) 100 units/mL subcutaneous injection 1-6 Units, 1-6 Units, Subcutaneous, HS, Leveda Siobhan, DO, 1 Units at 02/28/23 2238  •  insulin lispro (HumaLOG) 100 units/mL subcutaneous injection 5 Units, 5 Units, Subcutaneous, TID With Meals, GAURANG Pride, 5 Units at 03/06/23 1201  •  melatonin tablet 6 mg, 6 mg, Oral, HS PRN, Leveda Siobhan, DO, 6 mg at 02/28/23 2238  •  methocarbamol (ROBAXIN) tablet 500 mg, 500 mg, Oral, BID PRN, Leveda Siobhan, DO, 500 mg at 03/03/23 3045  •  midodrine (PROAMATINE) tablet 2 5 mg, 2 5 mg, Oral, BID AC, Georgeana Sever Harleman, CRNP, 2 5 mg at 03/06/23 1001  •  ondansetron (ZOFRAN) injection 4 mg, 4 mg, Intravenous, Q4H PRN, Leveda Siobhan, DO, 4 mg at 03/06/23 0079  •  oxyCODONE (ROXICODONE) IR tablet 2 5 mg, 2 5 mg, Oral, Q4H PRN, Leveda Siobhan, DO  •  oxyCODONE (ROXICODONE) IR tablet 5 mg, 5 mg, Oral, Q4H PRN, Leveda Siobhan, DO, 5 mg at 03/06/23 1256  •  pantoprazole (PROTONIX) EC tablet 40 mg, 40 mg, Oral, BID AC, Leveda Siobhan, DO, 40 mg at 03/06/23 0620  •  simethicone (MYLICON) chewable tablet 80 mg, 80 mg, Oral, 4x Daily (with meals and at bedtime), Adrianne Tabor DO, 80 mg at 03/06/23 1202  •  sodium chloride tablet 2 g, 2 g, Oral, BID With Meals, GAURANG Hinton, 2 g at 03/06/23 0950  •  tamsulosin (FLOMAX) capsule 0 4 mg, 0 4 mg, Oral, Daily With 1 Hospital DO [place], 0 4 mg at 03/05/23 1653    Past Medical History:   Diagnosis Date   • Abdominal pain 03/12/2022   • Acute renal failure ([place] [address] )     [id number] resolved   • Acute respiratory failure with hypoxia ([place] [address] ) 11/12/2022   • Bacteremia 11/12/2022   • Cancer ([place] [address] )    • Diabetes mellitus ([place] [address] )    • Enteritis 08/23/2016   • Flash pulmonary edema ([place] [address] ) 11/12/2022   • Gastroparesis due to DM ([place] [address] ) 08/23/2016   • GERD (gastroesophageal reflux disease)    • Hernia, ventral 08/04/2016   • Hyperlipidemia    • Hypertension    • Morbid obesity ([place] [address] ) 04/17/2018   • Postoperative visit 03/02/2022   • SIRS (systemic inflammatory response syndrome) ([place] [address] ) 03/12/2022   • Snoring    • Stage 3a chronic kidney disease ([place] [address] ) 02/19/2022       Patient Active Problem List    Diagnosis Date Noted   • Malignant neoplasm of transverse colon ([place] [address] ) 03/01/2022   • Sepsis ([place] [address] ) 12/17/2022   • 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 liver ([place] [address] ) 03/02/2022   • Abdominal wound dehiscence 02/24/2023   • Cholecystitis, unspecified 02/24/2023   • Depression 02/24/2023   • Chronic bilateral pleural effusions 02/24/2023   • Nephrolithiasis 02/09/2023   • Abnormal CT scan 02/07/2023   • Hyponatremia 02/07/2023   • Dehiscence of incision 12/30/2022   • Elevated alkaline phosphatase level 12/30/2022   • Leukocytosis 12/29/2022   • Abscess 12/27/2022   • Acute pain 12/27/2022   • Severe protein-calorie malnutrition ([facility] ) 12/14/2022   • [facility] 12/14/2022   • MR (mitral regurgitation) 11/12/2022   • ESBL (extended spectrum beta-lactamase) producing bacteria infection 11/12/2022   • Encephalopathy 11/09/2022   • Cervical radiculopathy 10/26/2022   • Other fatigue 06/15/2022   • [facility] 05/27/2022   • Colon cancer metastasized to liver ([place] [address] ) 03/12/2022   • Iron deficiency anemia, unspecified 03/01/2022   • Thrombocytosis 02/21/2022   • Hypokalemia 02/19/2022   • Transaminitis 02/19/2022   • Type 2 diabetes mellitus with hyperlipidemia ([place] [address] ) 02/18/2022   • Anemia 02/18/2022   • Left ureteral calculus 01/30/2020   • Incarcerated umbilical hernia 04/61/0677   • Testicular hypogonadism 06/19/2017   • Low testosterone 05/30/2017   • Type 2 diabetes mellitus without complication, with long-term current use of insulin ([place] [address] ) 08/23/2016   • Benign essential hypertension 08/23/2016   • Mixed hyperlipidemia 08/23/2016   • Erectile dysfunction 07/11/2016   • Obesity (BMI 30-39 9) 07/11/2016        GAURANG Fernández  [facility]

## 2023-03-06 NOTE — PROGRESS NOTES
03/06/23 1000   Pain Assessment   Pain Assessment Tool 0-10   Pain Score No Pain   Restrictions/Precautions   Precautions Fall Risk;Contact/isolation;Multiple lines  (ostomy, R drain)   Weight Bearing Restrictions No   ROM Restrictions No   Braces or Orthoses   (TEDs)   Oral Hygiene   Type of Assistance Needed Independent   Physical Assistance Level No physical assistance   Comment seated in w/c at sink which pt reports he was completing PTA until his standing tolerance improved  Oral Hygiene CARE Score 6   Shower/Bathe Self   Comment pt requesting to bathe james/rear only at this time  completed in stance with CG   Lower Body Dressing   Type of Assistance Needed Incidental touching   Physical Assistance Level No physical assistance   Comment seated to [facility] pants, stands with CG to complete CM   Lower Body Dressing CARE Score 4   Sit to Stand   Type of Assistance Needed Incidental touching   Physical Assistance Level No physical assistance   Comment CG/CS no AD   Sit to Stand CARE Score 4   Bed-Chair Transfer   Type of Assistance Needed Incidental touching   Physical Assistance Level No physical assistance   Comment CG SPT no AD   Chair/Bed-to-Chair Transfer CARE Score 4   Exercise Tools   Other Exercise Tool 1 pt engages in UE strengthening using 3# dowel, completing 4x10 of each of the following: shoulder press, shoulder flexion, chest press  pt tolerates exercises well with rest breaks in between each set to manage fatigue  strengthening completed in order to inc strength and endurance for ADLs/transfers  Cognition   Overall Cognitive Status WFL   Arousal/Participation Alert; Cooperative   Attention Within functional limits   Orientation Level Oriented X4   Memory Within functional limits   Following Commands Follows all commands and directions without difficulty   Additional Activities   Additional Activities Comments to further inc overall strength and endurance, pt completes repetitive STS transfers from EOB w/o use of UE support focusing on using LEs vs UEs  pt tolerates well with prolonged rest breaks in between each set/rep to manage fatigue  exercise completed in order to inc overall strength and IND for transfers  Activity Tolerance   Activity Tolerance Patient tolerated treatment well   Assessment   Treatment Assessment pt engages in 90 minute skilled OT session focusing on self care tasks, func transfers w/o AD and strengthening/exericses  see above for full func details  pt continues to demo slow and steady progress toward OT goals, reports feeling more tired this date from "good PT sessions over the weekend"  pt remains limited by decreased strength, endurance and activity tolerance, requiring inc time to complete all tasks  recommend continued skilled care to focus on ADL retraining, func transfers with LRAD, standing julia/bal, strength/endurance, activity tolerance, in order to decrease burden of care at d/c  Prognosis Good   Problem List Decreased strength;Decreased range of motion;Decreased endurance; Impaired balance;Decreased mobility;Pain   Barriers to Discharge Inaccessible home environment;Decreased caregiver support   Plan   Treatment/Interventions ADL retraining;Functional transfer training; Therapeutic exercise; Endurance training;Patient/family training;Equipment eval/education; Compensatory technique education   OT Therapy Minutes   OT Time In 1000   OT Time Out 1130   OT Total Time (minutes) 90   OT Mode of treatment - Individual (minutes) 90   OT Mode of treatment - Concurrent (minutes) 0   OT Mode of treatment - Group (minutes) 0   OT Mode of treatment - Co-treat (minutes) 0   OT Mode of Treatment - Total time(minutes) 90 minutes   OT Cumulative Minutes 600   Therapy Time missed   Time missed?  No

## 2023-03-06 NOTE — PLAN OF CARE
Problem: PAIN - ADULT  Goal: Verbalizes/displays adequate comfort level or baseline comfort level  Description: Interventions:  - Encourage patient to monitor pain and request assistance  - Assess pain using appropriate pain scale  - Administer analgesics based on type and severity of pain and evaluate response  - Implement non-pharmacological measures as appropriate and evaluate response  - Consider cultural and social influences on pain and pain management  - Notify physician/advanced practitioner if interventions unsuccessful or patient reports new pain  Outcome: Progressing     Problem: INFECTION - ADULT  Goal: Absence or prevention of progression during hospitalization  Description: INTERVENTIONS:  - Assess and monitor for signs and symptoms of infection  - Monitor lab/diagnostic results  - Monitor all insertion sites, i e  indwelling lines, tubes, and drains  - Monitor endotracheal if appropriate and nasal secretions for changes in amount and color  - [place] appropriate cooling/warming therapies per order  - Administer medications as ordered  - Instruct and encourage patient and family to use good hand hygiene technique  - Identify and instruct in appropriate isolation precautions for identified infection/condition  Outcome: Progressing  Goal: Absence of fever/infection during neutropenic period  Description: INTERVENTIONS:  - Monitor WBC    Outcome: Progressing     Problem: SAFETY ADULT  Goal: Patient will remain free of falls  Description: INTERVENTIONS:  - Educate patient/family on patient safety including physical limitations  - Instruct patient to call for assistance with activity   - Consult OT/PT to assist with strengthening/mobility   - Keep Call bell within reach  - Keep bed low and locked with side rails adjusted as appropriate  - Keep care items and personal belongings within reach  - Initiate and maintain comfort rounds  - Make Fall Risk Sign visible to staf      - Apply yellow socks and bracelet for high fall risk patients  - Consider moving patient to room near nurses station  Outcome: Progressing  Goal: Maintain or return to baseline ADL function  Description: INTERVENTIONS:  -  Assess patient's ability to carry out ADLs; assess patient's baseline for ADL function and identify physical deficits which impact ability to perform ADLs (bathing, care of mouth/teeth, toileting, grooming, dressing, etc )  - Assess/evaluate cause of self-care deficits   - Assess range of motion  - Assess patient's mobility; develop plan if impaired  - Assess patient's need for assistive devices and provide as appropriate  - Encourage maximum independence but intervene and supervise when necessary  - Involve family in performance of ADLs  - Assess for home care needs following discharge   - Consider OT consult to assist with ADL evaluation and planning for discharge  - Provide patient education as appropriate  Outcome: Progressing  Goal: Maintains/Returns to pre admission functional level  Description: INTERVENTIONS:  - Perform BMAT or MOVE assessment daily    - Set and communicate daily mobility goal to care team and patient/family/caregiver  - Collaborate with rehabilitation services on mobility goals if consulted    - Reposition patient every 2 hours    - Dangle patient 3 times a day  - Stand patient 3 times a day  - Ambulate patient 3 times a day  - Out of bed to chair 3 times a day   - Out of bed for meals 3 times a day  - Out of bed for toileting  - Record patient progress and toleration of activity level   Outcome: Progressing     Problem: DISCHARGE PLANNING  Goal: Discharge to home or other facility with appropriate resources  Description: INTERVENTIONS:  - Identify barriers to discharge w/patient and caregiver  - Arrange for needed discharge resources and transportation as appropriate  - Identify discharge learning needs (meds, wound care, etc )  - Arrange for interpretive services to assist at discharge as needed  - Refer to Case Management Department for coordinating discharge planning if the patient needs post-hospital services based on physician/advanced practitioner order or complex needs related to functional status, cognitive ability, or social support system  Outcome: Progressing     Problem: Prexisting or High Potential for Compromised Skin Integrity  Goal: Skin integrity is maintained or improved  Description: INTERVENTIONS:  - Identify patients at risk for skin breakdown  - Assess and monitor skin integrity  - Assess and monitor nutrition and hydration status  - Monitor labs   - Assess for incontinence   - Turn and reposition patient  - Assist with mobility/ambulation  - Relieve pressure over bony prominences  - Avoid friction and shearing  - Provide appropriate hygiene as needed including keeping skin clean and dry  - Evaluate need for skin moisturizer/barrier cream  - Collaborate with interdisciplinary team   - Patient/family teaching  - Consider wound care consult   Outcome: Progressing     Problem: Nutrition/Hydration-ADULT  Goal: Nutrient/Hydration intake appropriate for improving, restoring or maintaining nutritional needs  Description: Monitor and assess patient's nutrition/hydration status for malnutrition  Collaborate with interdisciplinary team and initiate plan and interventions as ordered  Monitor patient's weight and dietary intake as ordered or per policy  Utilize nutrition screening tool and intervene as necessary  Determine patient's food preferences and provide high-protein, high-caloric foods as appropriate       INTERVENTIONS:  - Monitor oral intake, urinary output, labs, and treatment plans  - Assess nutrition and hydration status and recommend course of action  - Evaluate amount of meals eaten  - Assist patient with eating if necessary   - Allow adequate time for meals  - Recommend/ encourage appropriate diets, oral nutritional supplements, and vitamin/mineral supplements  - Order, calculate, and assess calorie counts as needed  - Recommend, monitor, and adjust tube feedings and TPN/PPN based on assessed needs  - Assess need for intravenous fluids  - Provide specific nutrition/hydration education as appropriate  - Include patient/family/caregiver in decisions related to nutrition  Outcome: Progressing     Problem: MOBILITY - ADULT  Goal: Maintain or return to baseline ADL function  Description: INTERVENTIONS:  -  Assess patient's ability to carry out ADLs; assess patient's baseline for ADL function and identify physical deficits which impact ability to perform ADLs (bathing, care of mouth/teeth, toileting, grooming, dressing, etc )  - Assess/evaluate cause of self-care deficits   - Assess range of motion  - Assess patient's mobility; develop plan if impaired  - Assess patient's need for assistive devices and provide as appropriate  - Encourage maximum independence but intervene and supervise when necessary  - Involve family in performance of ADLs  - Assess for home care needs following discharge   - Consider OT consult to assist with ADL evaluation and planning for discharge  - Provide patient education as appropriate  Outcome: Progressing  Goal: Maintains/Returns to pre admission functional level  Description: INTERVENTIONS:  - Perform BMAT or MOVE assessment daily    - Set and communicate daily mobility goal to care team and patient/family/caregiver  - Collaborate with rehabilitation services on mobility goals if consulted    - Reposition patient every 2 hours    - Dangle patient 3 times a day  - Stand patient 3 times a day  - Ambulate patient 3 times a day  - Out of bed to chair 3 times a day   - Out of bed for meals 3 times a day  - Out of bed for toileting  - Record patient progress and toleration of activity level   Outcome: Progressing

## 2023-03-06 NOTE — TELEMEDICINE
e-Consult (IPC)  - Interventional Radiology  Vic Mart 61 y o  male MRN: [id number]  Unit/Bed#: [facility] 048-93 Encounter: [id number]          Interventional Radiology has been consulted to evaluate Vic Mart    We were consulted by [facility] concerning this patient with pain with flushing radha tube  Inpatient Consult to IR  Consult performed by: Sharyn Moritz, PA-C  Consult ordered by: Neptali Mccoy        03/06/23    Assessment/Recommendation:     61year old male well known to IR for multiple drains and cholecystostomy tube placed 12/8/22  Cholecystostomy tube is to gravity bag with minimal output  Patient with pain with flushing  T bili slightly increased to 1 41      - will plan for cholecystostomy tube check tomorrow  - monitor t bili  - may need repeat imaging if cholecystostomy tube is in proper position    11-20 minutes, >50% of the total time devoted to medical consultative verbal/EMR discussion between providers  Written report will be generated in the EMR  Thank you for allowing Interventional Radiology to participate in the care of Vic Mart  Please don't hesitate to call or TigerText us with any questions       Sharyn Moritz, PA-C

## 2023-03-06 NOTE — PROGRESS NOTES
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  • s/p diverting loop colostomy/liver biopsy with subsequent chemotherapy 2/2022  • s/p hepatic resection and reversal of colostomy on 11/7/22  • s/p ex-lap with bowel resection/VAC placement 11/16/22  • s/p right hemicolectomy with partial descending colectomy, colocolonic anastomosis with loop ileostomy and mid line abdominal VAC placement 11/17  • s/p additional drains placed for a perisplenic abscess and splenic abscess 12/20/22 (then had 3 drains plus the already existing perc radha tube)  • The 2 left lateral drains were removed by IR 1/31/23 as an OP  • Had previously been tx'd with Ertapenem  • This hospital stay had fever/leukocytosis/sepsis, started Meropenem and then to Vancomycin and Ertapenem  • Wound cx again = Ecoli ESBL  • LD Ertapenem 2/14, Vancomycin 2/16  • To IR 2/24 = Abscessogram of midline drain showed minimal persistent collection and the midline catheter was removed  • Recent wound debridement by general surgery prior to transfer = continue Santyl qd to wound  • Has chr elevated Alk phos 700's to 800s, now 900s today  • For CMP in AM     Acute cholecystitis  • s/p percutaneous tube placement 12/8/22  • CT scan was concerning for cholecystitis --> to IR 2/24 = showed retraction of the radha tube and interval occlusion of the cystic duct   +Cholecysto enteric fistula   Radha tube was exchanged   Surgery saw 2/27/23 = stable  • Drainage from perc radha tube is milky light green (20ml on 3/4/23 and nothing recorded from 3/5 although he says they did dump some from it)  • There is nothing in the drain bag now except a few ml = likely the perc radha tube needs to be checked = consulted IR      PORT catheter  • On 2/24/23 in IR = port check showed probable small thrombus at tip of cathter, flushed/improved aspiration of the port     HTN/orthostasis/tachycardia  • D/c'd Norvasc 2 5mg qd since BPs were too low to get  • Did have some symptomatic orthostasis on 0/39/63 98 systolic and 9/43/05 with 87/57 standing (102/71 sitting)  • Continue TEDS  • On 3/1/23 AM SBP standing was 87 with HR up to 122 w/o sx and then with PT SBP 98 with sx  • Started Midodrine 2 5mg BID on 3/2/23 (give at 0600, 1100) --> held 3/3/23 AM and lunchtime since SBPs were 140-150     • When OOB, his HR are 90s to low 100's   Seem to be improving  • TSH on 3/3/23 = 1 35     Diabetes mellitus  • Home:  Lantus 8U qam/Lispro 4U TID  • Here:  Lantus 5U qhs/Lispro 5U TID (increased from 4U starting last evening)   • Has a DEXCOM meter and a regular meter at home  • Continue DM diet, QID Accuchecks/SSI  • No changes today     ALEYX/CKD III  • Baseline 1 2-1 4  • Creat was 2 5 on admission with a K+ level of 5 9  • Currently creat has jumped to 1 66 = will give NSS with one liter to start  • CMP in AM 3/7/23     Anemia  • Transfused in December and January  • Hemoglobin was 7 1 on 2/26 = transfused x 1  • Hemoglobin has dropped to 7 7 and likely will drop more due to rehydration  • He has had no bleeding  • Will plan to recheck CBC in AM and perhaps will need to be transfused     Situational depression  • Cymbalta      Left pleural effusion  • Thoracentesis 1/12 for 300ml  • Cyto from pleural fluid was negative for malignancy; cx = NG  • CT 2/22/23 = "Interval worsening of moderate right and small left pleural effusions"  • Has had no SOB  • Will watch     Hyponatremia  • Mild  • Felt likely 2/2 SIADH in setting of malignancy  • On 2/27/23, added NACL 1 GM qd  • On 3/1/23, increased NACL tabs to BID, inc to 2Gm BID 3/3/23    • On 3/3/23 in AM vomited the 1Gm dose but he said it was because it gagged him, not nauseated  • Will keep NACL tabs same and also is getting 1 L NSS today for hydration considering ALEXY/CKD  • CMP AM 3/7     Malnutrition  • Had Magic cups BID ordered but didn't like them so stopped  • Prefers premier protein banana flavored shake --> wife brought in   One bottle has 30 Gm protein and only 4 carbs     Low grade temp  • Was 100 3 on evening of 2/25 = afebrile since then  • Has had mild leukocytosis  • ID did see 2/27 = watching for now; if temp >101 then to check blood cxs and consider repeat CT of the abd/pelvis  • WBC is up from 13 to 15 but may be on basis of dehydration        Discharge date:  Team       The above assessment and plan was reviewed and updated as determined by my evaluation of the patient on 3/6/2023      Labs:   Results from last 7 days   Lab Units 03/06/23  0952 03/06/23  0633   WBC Thousand/uL 15 85* 15 91*   HEMOGLOBIN g/dL 7 7* 7 5*   HEMATOCRIT % 24 8* 24 4*   PLATELETS Thousands/uL 338 352     Results from last 7 days   Lab Units 03/06/23  0952 03/06/23  0633   SODIUM mmol/L 128* 128*   POTASSIUM mmol/L 4 1 3 8   CHLORIDE mmol/L 102 102   CO2 mmol/L 18* 18*   BUN mg/dL 26* 26*   CREATININE mg/dL 1 66* 1 57*   CALCIUM mg/dL 8 1* 8 1*             Results from last 7 days   Lab Units 03/06/23  0623 03/05/23  2110 03/05/23  1555   POC GLUCOSE mg/dl 101 115 130       Review of Scheduled Meds:  Current Facility-Administered Medications   Medication Dose Route Frequency Provider Last Rate   • acetaminophen  650 mg Oral Q4H PRN Meliza Donovan, DO     • aspirin  81 mg Oral Daily Meliza Donovan, DO     • atorvastatin  40 mg Oral Daily Meliza Donovan, DO     • calcium carbonate  500 mg Oral TID PRN Lucent GAURANG Moore     • collagenase   Topical Daily Meliza Donovan, DO     • DULoxetine  30 mg Oral Daily Meliza Donovan, DO     • enoxaparin  40 mg Subcutaneous Q24H [facility] GAURANG Keith     • hydrOXYzine HCL  10 mg Oral HS Meliza Donovan, DO     • insulin glargine  5 Units Subcutaneous HS Meliza Donovan, DO     • insulin lispro  1-6 Units Subcutaneous TID AC Adrianne Copa, DO     • insulin lispro  1-6 Units Subcutaneous HS Adrianne Copa, DO     • insulin lispro  5 Units Subcutaneous TID With Meals Michaelyn Spatz, CRNP     • melatonin  6 mg Oral HS PRN Adrianne Copa, DO     • methocarbamol  500 mg Oral BID PRN Adrianne Copa, DO     • midodrine  2 5 mg Oral BID AC GAURANG Chan     • ondansetron  4 mg Intravenous Q4H PRN Adrianne Copa, DO     • oxyCODONE  2 5 mg Oral Q4H PRN Adrianne Copa, DO     • oxyCODONE  5 mg Oral Q4H PRN Adrianne Copa, DO     • pantoprazole  40 mg Oral BID AC Adrianne Copa, DO     • simethicone  80 mg Oral 4x Daily (with meals and at bedtime) Adrianne Copa, DO     • sodium chloride  75 mL/hr Intravenous Once Michaelyn Spatz, CRNP     • sodium chloride  2 g Oral BID With Meals Michaelyn Spatz, CRNP     • tamsulosin  0 4 mg Oral Daily With 1 [facility],          Subjective/ HPI: Patient seen and examined  Patients overnight issues or events were reviewed with nursing or staff during rounds or morning huddle session  New or overnight issues include the following:     Offers no complaints  Hemoglobin is down, TB is up, creat is elevated, WBC up to 15 8 w/o fever    ROS:   A 10 point ROS was performed; negative except as noted above         Imaging:     No orders to display       *Labs /Radiology studies reviewed  *Medications reviewed and reconciled as needed  *Please refer to order section for additional ordered labs studies  *Case discussed with primary attending during morning huddle case rounds    Physical Examination:  Vitals:   Vitals:    03/05/23 1415 03/05/23 2023 03/06/23 0500 03/06/23 0649   BP: 108/72 131/72 118/70 125/74   BP Location: Right arm Left arm Right arm Left arm   Pulse: 94 97 86 88   Resp: 17 18 18 18   Temp: 98 6 °F (37 °C) 98 3 °F (36 8 °C) 97 8 °F (36 6 °C) 98 3 °F (36 8 °C)   TempSrc: Oral Oral Oral Oral   SpO2: 97% 100%     Weight:       Height: General Appearance: no distress, conversive  HEENT: PERRLA, conjuctiva normal; oropharynx clear; mucous membranes moist   Neck:  Supple, normal ROM  Lungs: CTA, normal respiratory effort, no retractions, expiratory effort normal  CV: regular rate and rhythm; no rubs/murmurs/gallops, PMI normal   ABD: soft; ND/NT; +BS  Ostomy with watery light brown drainage  Per radha tube = only a ml or two in the bag since yesterday  EXT: no edema  Skin: normal turgor, normal texture, no rashes  Psych: affect normal, mood normal  Neuro: AAO     The above physical exam was reviewed and updated as determined by my evaluation of the patient on 3/6/2023  Invasive Devices     Central Venous Catheter Line  Duration           Port A Cath 03/10/22 Right Chest 361 days          Drain  Duration           Ileostomy  days    Cholecystostomy Tube 10 days                   VTE Pharmacologic Prophylaxis: Enoxaparin  Code Status: Level 1 - Full Code  Current Length of Stay: 10 day(s)      Total time spent:  30 minutes with more than 50% spent counseling/coordinating care  Counseling includes discussion with patient re: progress  and discussion with patient of his/her current medical state/information  Coordination of patient's care was performed in conjunction with primary service  Time invested included review of patient's labs, vitals, and management of their comorbidities with continued monitoring  In addition, this patient was discussed with medical team including physician and advanced extenders  The care of the patient was extensively discussed and appropriate treatment plan was formulated unique for this patient  Medical decision making for the day was made by supervising physician unless otherwise noted in their attestation statement  ** Please Note:  voice to text software may have been used in the creation of this document   Although proof errors in transcription or interpretation are a potential of such software**

## 2023-03-06 NOTE — PROGRESS NOTES
03/06/23 1305   Pain Assessment   Pain Assessment Tool 0-10   Pain Score 6   Restrictions/Precautions   Precautions Fall Risk;Contact/isolation;Multiple lines  (ostomy, R drain)   Weight Bearing Restrictions No   ROM Restrictions No   Braces or Orthoses   (b/l TEDs)   Subjective   Subjective reports he had a better day yesterday than today  On IV fluids but removed for session by RN  Sit to Stand   Type of Assistance Needed Incidental touching   Comment primarily CS but occ CGA due to c/o severe lightheadedness when standing   Sit to Stand CARE Score 4   Bed-Chair Transfer   Type of Assistance Needed Incidental touching   Comment primarily CS but occ CGA due to c/o severe lightheadedness when standing   Chair/Bed-to-Chair Transfer CARE Score 4   Car Transfer   Type of Assistance Needed Physical assistance   Physical Assistance Level 25% or less   Comment with RW this session, required boost from car but anticipate from pt's SUV he would not require physical A   Car Transfer CARE Score 3   Walk 10 Feet   Type of Assistance Needed Supervision   Comment CS with RW   Walk 10 Feet CARE Score 4   Walk 50 Feet with Two Turns   Type of Assistance Needed Supervision   Comment CS with RW   Walk 50 Feet with Two Turns CARE Score 4   Walk 150 Feet   Comment 80' max this session   Reason if not Attempted Safety concerns   Walk 150 Feet CARE Score 88   Walking 10 Feet on Uneven Surfaces   Comment (S)  please perform next session   Ambulation   Primary Mode of Locomotion Prior to Admission Walk   Distance Walked (feet) 80 ft  (x2)   Assist Device Roller Walker   Gait Pattern Slow Josefina;Narrow LUIS; Decreased foot clearance   Limitations Noted In Balance; Endurance; Heel Strike;Posture;Speed;Strength;Swing   Walk Assist Level Close Supervision   Findings initially attempted to use SPC, however, due to severe lightheadedness upon standing elected to utilize RW this session for safety   Reviewed with pt that each day will differ and pt to use appropriate AD based on how he feels  Educated pt at times it may even be that pt needs to utilize w/c for transport around home which pt accepting of  Does the patient walk? 2  Yes   Curb or Single Stair   Comment elected not to perform this session due to fatigue, can reinitiate next session if pt feeling up to it   Reason if not Attempted Safety concerns   1 Step (Curb) CARE Score 88   4 Steps   Comment elected not to perform this session due to fatigue, can reinitiate next session if pt feeling up to it   Reason if not Attempted Safety concerns   4 Steps CARE Score 88   12 Steps   Comment elected not to perform this session due to fatigue, can reinitiate next session if pt feeling up to it   Reason if not Attempted Safety concerns   12 Steps CARE Score 80   Stairs   Findings discussed with pt that he plans only to go up stairs 2x per week as he wants to sleep in his own bed  States he will not try w/o home PT first which PT in agreement with  Discussed placing chairs on landings so pt can sit and rest which pt plans to do  Due to low BP initially also elected not to perform this session for safety   Therapeutic Interventions   Strengthening seated APs, hip flex, LAQs, hip add, hip abd with manual resistance 3x10  w/c pushups 3x5 per pt tolerance   Flexibility passive b/l hamstring/gastroc 6x60 sec hold t/o session   Other PT offered to pt to have wife come in for FT prior to dc home if she would like  Pt states he will pass on to wife but states that she was impressed with how pt was doing when she saw him last week  Will f/u with pt   Other Comments   Comments multiple BPs taken this session from sitting to standing  112/68 sitting, dropped to 92/66 standing and pt 6-7/10 lightheadedness  Improved to 122/66 post TE seated and only 3/10 lightheadedness in standing   HR continues to remain tachycardic during session, with activity increased to 139 BPM    Assessment   Treatment Assessment 90 min skilled PT session focusing on LE strengthening, conditioning, gait and transfer trng with use of RW  Fatigue impacting pt's tolerance to mobilities this session  Requires frequent prolonged rest breaks due to fatigue  Also noted ortho BPs start of session and pt symptomatic, improved once seated TE performed  Use of RW this session although pt goals are to use SPC  Discussed with pt that due to flucutating fatigue levels pt will need to determine safest AD to utilize once home whether that be wc, RW, or SPC, pt receptive to education  Pt elected not to perform stairs this session as not feeling up to it and states this is exactly how he would do this at home as well  At this time please cont to work on improving pt's safety with all mobilities with RW vs SPC, stair trng when pt able, LE strengthening and endurance trng as well as balance interventions to reduce risk for fall  Planning for d/c home 3/10/23 with recommendations for home PT  PT Barriers   Physical Impairment Impaired balance;Decreased endurance;Decreased strength   Functional Limitation Walking;Transfers;Standing;Stair negotiation;Ramp negotiation;Car transfers   Plan   Treatment/Interventions Functional transfer training;LE strengthening/ROM; Elevations; Therapeutic exercise; Endurance training;Bed mobility;Gait training   Progress Progressing toward goals   Recommendation   PT Discharge Recommendation Home with home health rehabilitation   Equipment Recommended Walker;Cane   PT Therapy Minutes   PT Time In 1305   PT Time Out 1435   PT Total Time (minutes) 90   PT Mode of treatment - Individual (minutes) 90   PT Mode of treatment - Concurrent (minutes) 0   PT Mode of treatment - Group (minutes) 0   PT Mode of treatment - Co-treat (minutes) 0   PT Mode of Treatment - Total time(minutes) 90 minutes   PT Cumulative Minutes 850   Therapy Time missed   Time missed?  No

## 2023-03-06 NOTE — CASE MANAGEMENT
CM sent referral via [employer] through [facility] to [facility] for RN/PT/OT services

## 2023-03-07 ENCOUNTER — APPOINTMENT (INPATIENT)
Dept: RADIOLOGY | Facility: HOSPITAL | Age: 58
End: 2023-03-07

## 2023-03-07 LAB
ALBUMIN SERPL BCP-MCNC: 1.2 G/DL (ref 3.5–5)
ALP SERPL-CCNC: 966 U/L (ref 46–116)
ALT SERPL W P-5'-P-CCNC: 14 U/L (ref 12–78)
ANION GAP SERPL CALCULATED.3IONS-SCNC: 8 MMOL/L (ref 4–13)
AST SERPL W P-5'-P-CCNC: 86 U/L (ref 5–45)
BASOPHILS # BLD AUTO: 0.05 THOUSANDS/ÂΜL (ref 0–0.1)
BASOPHILS NFR BLD AUTO: 0 % (ref 0–1)
BILIRUB SERPL-MCNC: 1.77 MG/DL (ref 0.2–1)
BUN SERPL-MCNC: 24 MG/DL (ref 5–25)
CALCIUM ALBUM COR SERPL-MCNC: 10.6 MG/DL (ref 8.3–10.1)
CALCIUM SERPL-MCNC: 8.4 MG/DL (ref 8.3–10.1)
CHLORIDE SERPL-SCNC: 105 MMOL/L (ref 96–108)
CO2 SERPL-SCNC: 17 MMOL/L (ref 21–32)
CREAT SERPL-MCNC: 1.58 MG/DL (ref 0.6–1.3)
DME PARACHUTE DELIVERY DATE REQUESTED: NORMAL
DME PARACHUTE DELIVERY DATE REQUESTED: NORMAL
DME PARACHUTE DELIVERY NOTE: NORMAL
DME PARACHUTE DELIVERY NOTE: NORMAL
DME PARACHUTE ITEM DESCRIPTION: NORMAL
DME PARACHUTE ORDER STATUS: NORMAL
DME PARACHUTE ORDER STATUS: NORMAL
DME PARACHUTE SUPPLIER NAME: NORMAL
DME PARACHUTE SUPPLIER NAME: NORMAL
DME PARACHUTE SUPPLIER PHONE: NORMAL
DME PARACHUTE SUPPLIER PHONE: NORMAL
EOSINOPHIL # BLD AUTO: 0 THOUSAND/ÂΜL (ref 0–0.61)
EOSINOPHIL NFR BLD AUTO: 0 % (ref 0–6)
ERYTHROCYTE [DISTWIDTH] IN BLOOD BY AUTOMATED COUNT: 17.2 % (ref 11.6–15.1)
GFR SERPL CREATININE-BSD FRML MDRD: 47 ML/MIN/1.73SQ M
GLUCOSE P FAST SERPL-MCNC: 98 MG/DL (ref 65–99)
GLUCOSE SERPL-MCNC: 189 MG/DL (ref 65–140)
GLUCOSE SERPL-MCNC: 71 MG/DL (ref 65–140)
GLUCOSE SERPL-MCNC: 81 MG/DL (ref 65–140)
GLUCOSE SERPL-MCNC: 84 MG/DL (ref 65–140)
GLUCOSE SERPL-MCNC: 91 MG/DL (ref 65–140)
GLUCOSE SERPL-MCNC: 95 MG/DL (ref 65–140)
GLUCOSE SERPL-MCNC: 98 MG/DL (ref 65–140)
HCT VFR BLD AUTO: 24.5 % (ref 36.5–49.3)
HGB BLD-MCNC: 7.6 G/DL (ref 12–17)
IMM GRANULOCYTES # BLD AUTO: 0.28 THOUSAND/UL (ref 0–0.2)
IMM GRANULOCYTES NFR BLD AUTO: 2 % (ref 0–2)
LYMPHOCYTES # BLD AUTO: 1.23 THOUSANDS/ÂΜL (ref 0.6–4.47)
LYMPHOCYTES NFR BLD AUTO: 7 % (ref 14–44)
MCH RBC QN AUTO: 25.6 PG (ref 26.8–34.3)
MCHC RBC AUTO-ENTMCNC: 31 G/DL (ref 31.4–37.4)
MCV RBC AUTO: 83 FL (ref 82–98)
MONOCYTES # BLD AUTO: 1.54 THOUSAND/ÂΜL (ref 0.17–1.22)
MONOCYTES NFR BLD AUTO: 9 % (ref 4–12)
NEUTROPHILS # BLD AUTO: 13.97 THOUSANDS/ÂΜL (ref 1.85–7.62)
NEUTS SEG NFR BLD AUTO: 82 % (ref 43–75)
NRBC BLD AUTO-RTO: 0 /100 WBCS
PLATELET # BLD AUTO: 351 THOUSANDS/UL (ref 149–390)
PMV BLD AUTO: 9.8 FL (ref 8.9–12.7)
POTASSIUM SERPL-SCNC: 4 MMOL/L (ref 3.5–5.3)
PROT SERPL-MCNC: 6.7 G/DL (ref 6.4–8.4)
RBC # BLD AUTO: 2.97 MILLION/UL (ref 3.88–5.62)
SODIUM SERPL-SCNC: 130 MMOL/L (ref 135–147)
WBC # BLD AUTO: 17.07 THOUSAND/UL (ref 4.31–10.16)

## 2023-03-07 PROCEDURE — 0WJG3ZZ INSPECTION OF PERITONEAL CAVITY, PERCUTANEOUS APPROACH: ICD-10-PCS | Performed by: RADIOLOGY

## 2023-03-07 RX ORDER — FENTANYL CITRATE 50 UG/ML
INJECTION, SOLUTION INTRAMUSCULAR; INTRAVENOUS AS NEEDED
Status: COMPLETED | OUTPATIENT
Start: 2023-03-07 | End: 2023-03-07

## 2023-03-07 RX ORDER — SODIUM CHLORIDE 9 MG/ML
75 INJECTION, SOLUTION INTRAVENOUS ONCE
Status: COMPLETED | OUTPATIENT
Start: 2023-03-07 | End: 2023-03-07

## 2023-03-07 RX ADMIN — FENTANYL CITRATE 25 MCG: 50 INJECTION, SOLUTION INTRAMUSCULAR; INTRAVENOUS at 15:04

## 2023-03-07 RX ADMIN — IOHEXOL 10 ML: 350 INJECTION, SOLUTION INTRAVENOUS at 15:16

## 2023-03-07 RX ADMIN — INSULIN LISPRO 5 UNITS: 100 INJECTION, SOLUTION INTRAVENOUS; SUBCUTANEOUS at 09:33

## 2023-03-07 RX ADMIN — DULOXETINE HYDROCHLORIDE 30 MG: 30 CAPSULE, DELAYED RELEASE ORAL at 09:32

## 2023-03-07 RX ADMIN — INSULIN LISPRO 5 UNITS: 100 INJECTION, SOLUTION INTRAVENOUS; SUBCUTANEOUS at 13:04

## 2023-03-07 RX ADMIN — ENOXAPARIN SODIUM 40 MG: 40 INJECTION SUBCUTANEOUS at 09:40

## 2023-03-07 RX ADMIN — FENTANYL CITRATE 25 MCG: 50 INJECTION, SOLUTION INTRAMUSCULAR; INTRAVENOUS at 14:58

## 2023-03-07 RX ADMIN — TAMSULOSIN HYDROCHLORIDE 0.4 MG: 0.4 CAPSULE ORAL at 16:39

## 2023-03-07 RX ADMIN — OXYCODONE HYDROCHLORIDE 5 MG: 5 TABLET ORAL at 22:30

## 2023-03-07 RX ADMIN — ONDANSETRON 4 MG: 2 INJECTION INTRAMUSCULAR; INTRAVENOUS at 09:32

## 2023-03-07 RX ADMIN — PANTOPRAZOLE SODIUM 40 MG: 40 TABLET, DELAYED RELEASE ORAL at 05:49

## 2023-03-07 RX ADMIN — ASPIRIN 81 MG CHEWABLE TABLET 81 MG: 81 TABLET CHEWABLE at 09:32

## 2023-03-07 RX ADMIN — OXYCODONE HYDROCHLORIDE 5 MG: 5 TABLET ORAL at 09:32

## 2023-03-07 RX ADMIN — CARBIDOPA AND LEVODOPA 2.5 MG: 50; 200 TABLET, EXTENDED RELEASE ORAL at 05:48

## 2023-03-07 RX ADMIN — ATORVASTATIN CALCIUM 40 MG: 40 TABLET, FILM COATED ORAL at 09:32

## 2023-03-07 RX ADMIN — SODIUM CHLORIDE 2 G: 1 TABLET ORAL at 09:37

## 2023-03-07 RX ADMIN — CARBIDOPA AND LEVODOPA 2.5 MG: 50; 200 TABLET, EXTENDED RELEASE ORAL at 13:04

## 2023-03-07 RX ADMIN — INSULIN LISPRO 1 UNITS: 100 INJECTION, SOLUTION INTRAVENOUS; SUBCUTANEOUS at 13:06

## 2023-03-07 RX ADMIN — INSULIN LISPRO 5 UNITS: 100 INJECTION, SOLUTION INTRAVENOUS; SUBCUTANEOUS at 16:40

## 2023-03-07 RX ADMIN — PANTOPRAZOLE SODIUM 40 MG: 40 TABLET, DELAYED RELEASE ORAL at 16:39

## 2023-03-07 RX ADMIN — HYDROXYZINE HYDROCHLORIDE 10 MG: 10 TABLET ORAL at 22:27

## 2023-03-07 RX ADMIN — SIMETHICONE 80 MG: 80 TABLET, CHEWABLE ORAL at 16:39

## 2023-03-07 RX ADMIN — SIMETHICONE 80 MG: 80 TABLET, CHEWABLE ORAL at 09:32

## 2023-03-07 RX ADMIN — COLLAGENASE SANTYL 1 APPLICATION.: 250 OINTMENT TOPICAL at 19:09

## 2023-03-07 RX ADMIN — SODIUM CHLORIDE 75 ML/HR: 0.9 INJECTION, SOLUTION INTRAVENOUS at 16:39

## 2023-03-07 RX ADMIN — SIMETHICONE 80 MG: 80 TABLET, CHEWABLE ORAL at 22:27

## 2023-03-07 RX ADMIN — SIMETHICONE 80 MG: 80 TABLET, CHEWABLE ORAL at 13:04

## 2023-03-07 NOTE — QUICK NOTE
Tube check today did not seem to point toward malfunction that could be causing an abrupt rise in total bilirubin  GI has been consulted and will see tomorrow

## 2023-03-07 NOTE — PLAN OF CARE
Problem: PAIN - ADULT  Goal: Verbalizes/displays adequate comfort level or baseline comfort level  Description: Interventions:  - Encourage patient to monitor pain and request assistance  - Assess pain using appropriate pain scale  - Administer analgesics based on type and severity of pain and evaluate response  - Implement non-pharmacological measures as appropriate and evaluate response  - Consider cultural and social influences on pain and pain management  - Notify physician/advanced practitioner if interventions unsuccessful or patient reports new pain  Outcome: Progressing     Problem: INFECTION - ADULT  Goal: Absence or prevention of progression during hospitalization  Description: INTERVENTIONS:  - Assess and monitor for signs and symptoms of infection  - Monitor lab/diagnostic results  - Monitor all insertion sites, i e  indwelling lines, tubes, and drains  - Monitor endotracheal if appropriate and nasal secretions for changes in amount and color  - [place] appropriate cooling/warming therapies per order  - Administer medications as ordered  - Instruct and encourage patient and family to use good hand hygiene technique  - Identify and instruct in appropriate isolation precautions for identified infection/condition  Outcome: Progressing  Goal: Absence of fever/infection during neutropenic period  Description: INTERVENTIONS:  - Monitor WBC    Outcome: Progressing     Problem: SAFETY ADULT  Goal: Patient will remain free of falls  Description: INTERVENTIONS:  - Educate patient/family on patient safety including physical limitations  - Instruct patient to call for assistance with activity   - Consult OT/PT to assist with strengthening/mobility   - Keep Call bell within reach  - Keep bed low and locked with side rails adjusted as appropriate  - Keep care items and personal belongings within reach  - Initiate and maintain comfort rounds  - Make Fall Risk Sign visible to staff  - Offer Toileting every 2 Hours, in advance of need  - Initiate/Maintain alarm  - Obtain necessary fall risk management equipment:   - Apply yellow socks and bracelet for high fall risk patients  - Consider moving patient to room near nurses station  Outcome: Progressing  Goal: Maintain or return to baseline ADL function  Description: INTERVENTIONS:  -  Assess patient's ability to carry out ADLs; assess patient's baseline for ADL function and identify physical deficits which impact ability to perform ADLs (bathing, care of mouth/teeth, toileting, grooming, dressing, etc )  - Assess/evaluate cause of self-care deficits   - Assess range of motion  - Assess patient's mobility; develop plan if impaired  - Assess patient's need for assistive devices and provide as appropriate  - Encourage maximum independence but intervene and supervise when necessary  - Involve family in performance of ADLs  - Assess for home care needs following discharge   - Consider OT consult to assist with ADL evaluation and planning for discharge  - Provide patient education as appropriate  Outcome: Progressing  Goal: Maintains/Returns to pre admission functional level  Description: INTERVENTIONS:  - Perform BMAT or MOVE assessment daily    - Set and communicate daily mobility goal to care team and patient/family/caregiver  - Collaborate with rehabilitation services on mobility goals if consulted  - Perform Range of Motion 3 times a day  - Reposition patient every 2 hours    - Dangle patient 3 times a day  - Stand patient 3 times a day  - Ambulate patient 3 times a day  - Out of bed to chair 3 times a day   - Out of bed for meals 3 times a day  - Out of bed for toileting  - Record patient progress and toleration of activity level   Outcome: Progressing     Problem: DISCHARGE PLANNING  Goal: Discharge to home or other facility with appropriate resources  Description: INTERVENTIONS:  - Identify barriers to discharge w/patient and caregiver  - Arrange for needed discharge resources and transportation as appropriate  - Identify discharge learning needs (meds, wound care, etc )  - Arrange for interpretive services to assist at discharge as needed  - Refer to Case Management Department for coordinating discharge planning if the patient needs post-hospital services based on physician/advanced practitioner order or complex needs related to functional status, cognitive ability, or social support system  Outcome: Progressing     Problem: Prexisting or High Potential for Compromised Skin Integrity  Goal: Skin integrity is maintained or improved  Description: INTERVENTIONS:  - Identify patients at risk for skin breakdown  - Assess and monitor skin integrity  - Assess and monitor nutrition and hydration status  - Monitor labs   - Assess for incontinence   - Turn and reposition patient  - Assist with mobility/ambulation  - Relieve pressure over bony prominences  - Avoid friction and shearing  - Provide appropriate hygiene as needed including keeping skin clean and dry  - Evaluate need for skin moisturizer/barrier cream  - Collaborate with interdisciplinary team   - Patient/family teaching  - Consider wound care consult   Outcome: Progressing     Problem: Nutrition/Hydration-ADULT  Goal: Nutrient/Hydration intake appropriate for improving, restoring or maintaining nutritional needs  Description: Monitor and assess patient's nutrition/hydration status for malnutrition  Collaborate with interdisciplinary team and initiate plan and interventions as ordered  Monitor patient's weight and dietary intake as ordered or per policy  Utilize nutrition screening tool and intervene as necessary  Determine patient's food preferences and provide high-protein, high-caloric foods as appropriate       INTERVENTIONS:  - Monitor oral intake, urinary output, labs, and treatment plans  - Assess nutrition and hydration status and recommend course of action  - Evaluate amount of meals eaten  - Assist patient with eating if necessary   - Allow adequate time for meals  - Recommend/ encourage appropriate diets, oral nutritional supplements, and vitamin/mineral supplements  - Order, calculate, and assess calorie counts as needed  - Recommend, monitor, and adjust tube feedings and TPN/PPN based on assessed needs  - Assess need for intravenous fluids  - Provide specific nutrition/hydration education as appropriate  - Include patient/family/caregiver in decisions related to nutrition  Outcome: Progressing     Problem: MOBILITY - ADULT  Goal: Maintain or return to baseline ADL function  Description: INTERVENTIONS:  -  Assess patient's ability to carry out ADLs; assess patient's baseline for ADL function and identify physical deficits which impact ability to perform ADLs (bathing, care of mouth/teeth, toileting, grooming, dressing, etc )  - Assess/evaluate cause of self-care deficits   - Assess range of motion  - Assess patient's mobility; develop plan if impaired  - Assess patient's need for assistive devices and provide as appropriate  - Encourage maximum independence but intervene and supervise when necessary  - Involve family in performance of ADLs  - Assess for home care needs following discharge   - Consider OT consult to assist with ADL evaluation and planning for discharge  - Provide patient education as appropriate  Outcome: Progressing  Goal: Maintains/Returns to pre admission functional level  Description: INTERVENTIONS:  - Perform BMAT or MOVE assessment daily    - Set and communicate daily mobility goal to care team and patient/family/caregiver  - Collaborate with rehabilitation services on mobility goals if consulted  - Perform Range of Motion 3 times a day  - Reposition patient every 2 hours    - Dangle patient 3 times a day  - Stand patient 3 times a day  - Ambulate patient 3 times a day  - Out of bed to chair 3 times a day   - Out of bed for meals 3 times a day  - Out of bed for toileting  - Record patient progress and toleration of activity level   Outcome: Progressing

## 2023-03-07 NOTE — PROGRESS NOTES
03/07/23 1030   Pain Assessment   Pain Assessment Tool 0-10   Pain Score No Pain   Restrictions/Precautions   Precautions Fall Risk;Contact/isolation   Weight Bearing Restrictions No   ROM Restrictions No   Braces or Orthoses   (Bilat TEDs)   Cognition   Overall Cognitive Status WFL   Arousal/Participation Alert; Cooperative   Attention Within functional limits   Orientation Level Oriented X4   Memory Within functional limits   Following Commands Follows all commands and directions without difficulty   Subjective   Subjective Agreeable to PT; c/o "washed out", fatigue but remains motivated  Became tearful when disussing newly elevated WBC count with MD and pending procedure this afternoon to determine cause  Provided emotional support and encouragement to patient  Pt appreciative of perspective of progress made thus far and previous "hurdles" overcome  Pt requesting to empty ostomy post-session  Initially requesting to trial emptying in bathroom, straddling toilet? or standing to empty into toilet  Reviewed safety precautions with pt and RN and all agree best to perform seated in recliner and empty into canister  Sit to Stand   Type of Assistance Needed Incidental touching   Physical Assistance Level No physical assistance   Sit to Stand CARE Score 4   Bed-Chair Transfer   Type of Assistance Needed Incidental touching; Adaptive equipment   Physical Assistance Level No physical assistance   Comment SPC or RW   Chair/Bed-to-Chair Transfer CARE Score 4   Transfer Bed/Chair/Wheelchair   Adaptive Equipment Roller Walker;Cane   Walk 10 Feet   Type of Assistance Needed Supervision; Adaptive equipment   Physical Assistance Level No physical assistance   Comment RW   Walk 10 Feet CARE Score 4   Walk 50 Feet with Two Turns   Type of Assistance Needed Supervision; Adaptive equipment   Physical Assistance Level No physical assistance   Comment RW   Walk 50 Feet with Two Turns CARE Score 4   Ambulation   Primary Mode of Locomotion Prior to Admission Walk   Distance Walked (feet) 50 ft  (50ft x1, 75ft x1)   Assist Device Roller Walker   Gait Pattern Inconsistant Josefina; Slow Josefina;Narrow LUIS; Decreased foot clearance   Limitations Noted In Balance; Endurance;Speed;Strength   Walk Assist Level Close Supervision   Findings Worked within shorter distances today due to pt's overall level of fatigue and tearful affect  Does the patient walk? 2  Yes   Curb or Single Stair   Style negotiated Single stair   Type of Assistance Needed Incidental touching; Adaptive equipment   Physical Assistance Level No physical assistance   Comment Bilat HRs, descended bkwd, 2 steps  1 Step (Curb) CARE Score 4   Stairs   Type Stairs   # of Steps 2   Weight Bearing Precautions Fall Risk   Assist Devices Bilateral Rail   Findings Number of stairs limited by pt's fatigue and emotional/tearful affect during session  Therapeutic Interventions   Strengthening Standing step taps with bilat UE support, 20x each  Flexibility Manual hamstring, heel cord, hip IE/ER, hip add stretch 3x 60" each  Assessment   Treatment Assessment Pt participated in 60 minute PT session with focus on short distance amb with RW, transfers and stair training, strengthening and endurance training  Edu also provided on pending procedure this PM to determine cause of elevated WBC count, ?drain blockage  Pt emotional, tearful with discussion and worried this may effect d/c planning  Edu and review of progress to date as well as ongoing goals and plan as it relates to therapy to continue to improve functional independence, strength and return to prior level of mobility  Pt appreciative of support given and remains motivated through session  He continues to requires fluctuating levels of assist/supervision depending on fatigue level however overall strength and balance improving   Opted to amb with RW this session due to fatigue and emotional affect of pt and pt receptive to edu on utilizing AD that is most appropriate, even at home upon d/c, understanding some days this may mean use of RW, SPC or w/c  Pt will cont to benefit from skilled PT to further improve functional strength, balance, endurance, indep and safety with all mobility in prep for d/c home to reduce fall risk and caregiver burden  Anticipating d/c home this Friday 3/10/23 pending medical stability  Rec homecare f/u services  Family/Caregiver Present No   Problem List Decreased strength;Decreased range of motion;Decreased endurance; Impaired balance;Decreased mobility   Barriers to Discharge Inaccessible home environment   PT Barriers   Physical Impairment Decreased strength;Decreased range of motion; Impaired balance;Decreased endurance;Decreased mobility   Functional Limitation Car transfers; Ramp negotiation;Stair negotiation;Standing;Transfers; Walking   Plan   Treatment/Interventions Functional transfer training;LE strengthening/ROM; Elevations; Therapeutic exercise; Endurance training;Patient/family training;Equipment eval/education; Bed mobility;Gait training; Compensatory technique education   Progress Progressing toward goals   Recommendation   PT Discharge Recommendation Home with home health rehabilitation   Equipment Recommended Walker;Cane   PT Therapy Minutes   PT Time In 1030   PT Time Out 1130   PT Total Time (minutes) 60   PT Mode of treatment - Individual (minutes) 60   PT Mode of treatment - Concurrent (minutes) 0   PT Mode of treatment - Group (minutes) 0   PT Mode of treatment - Co-treat (minutes) 0   PT Mode of Treatment - Total time(minutes) 60 minutes   PT Cumulative Minutes 910   Therapy Time missed   Time missed?  No

## 2023-03-07 NOTE — BRIEF OP NOTE (RAD/CATH)
IR CHOLECYSTOSTOMY TUBE CHECK/CHANGE/REPOSITION/REINSERTION/UPSIZE Procedure Note    PATIENT NAME: Ophelia Robles  : 1965  MRN: [id number]    Pre-op Diagnosis:   1  Nephrolithiasis    2  Malignant neoplasm of transverse colon ([facility] [address] )    3  Transaminitis    4  Type 2 diabetes mellitus with hyperlipidemia ([facility] [address] )    5  Erectile dysfunction, unspecified erectile dysfunction type    6  Low testosterone    7  Mixed hyperlipidemia    8  Benign essential hypertension    9  Acute renal failure superimposed on stage 3a chronic kidney disease, unspecified acute renal failure type ([address] )    10  Enlarged and hypertrophic nails    11  Colon cancer metastasized to liver ([facility] [address] )    12  Leukocytosis    13  Cholecystitis, unspecified    14  Dehiscence of incision    15  Sepsis ([facility] [address] )    16  Elevated bilirubin    17  Abdominal wound dehiscence, subsequent encounter    18  Leukocytosis, unspecified type      Post-op Diagnosis:   1  Nephrolithiasis    2  Malignant neoplasm of transverse colon ([address] )    3  Transaminitis    4  Type 2 diabetes mellitus with hyperlipidemia ([facility] [address] )    5  Erectile dysfunction, unspecified erectile dysfunction type    6  Low testosterone    7  Mixed hyperlipidemia    8  Benign essential hypertension    9  Acute renal failure superimposed on stage 3a chronic kidney disease, unspecified acute renal failure type ([address] )    10  Enlarged and hypertrophic nails    11  Colon cancer metastasized to liver ([facility] [address] )    12  Leukocytosis    13  Cholecystitis, unspecified    14  Dehiscence of incision    15  Sepsis ([facility] [address] )    16  Elevated bilirubin    17  Abdominal wound dehiscence, subsequent encounter    18   Leukocytosis, unspecified type        Provider:   GAURANG Singh  Assistants:     No qualified resident was available, Resident is only observing    Estimated Blood Loss: none    Findings: diagnostic cholangiogram demonstrating filling of cystic and common bile duct, minimal pain, no leaking, slight adjustment of tube further into gallbladder body  Tube capped, can consider removal if patient passes capping trial in 2 weeks and if surgery is in agreement       Specimens: none    Complications:  None immediate     Anesthesia: local and none    GAURANG Mensah     Date: 3/7/2023  Time: 3:17 PM

## 2023-03-07 NOTE — PLAN OF CARE
Reviewed    Problem: PAIN - ADULT  Goal: Verbalizes/displays adequate comfort level or baseline comfort level  Description: Interventions:  - Encourage patient to monitor pain and request assistance  - Assess pain using appropriate pain scale  - Administer analgesics based on type and severity of pain and evaluate response  - Implement non-pharmacological measures as appropriate and evaluate response  - Consider cultural and social influences on pain and pain management  - Notify physician/advanced practitioner if interventions unsuccessful or patient reports new pain  Outcome: Progressing     Problem: INFECTION - ADULT  Goal: Absence or prevention of progression during hospitalization  Description: INTERVENTIONS:  - Assess and monitor for signs and symptoms of infection  - Monitor lab/diagnostic results  - Monitor all insertion sites, i e  indwelling lines, tubes, and drains  - Monitor endotracheal if appropriate and nasal secretions for changes in amount and color  - Nimitz appropriate cooling/warming therapies per order  - Administer medications as ordered  - Instruct and encourage patient and family to use good hand hygiene technique  - Identify and instruct in appropriate isolation precautions for identified infection/condition  Outcome: Progressing  Goal: Absence of fever/infection during neutropenic period  Description: INTERVENTIONS:  - Monitor WBC    Outcome: Progressing     Problem: SAFETY ADULT  Goal: Patient will remain free of falls  Description: INTERVENTIONS:  - Educate patient/family on patient safety including physical limitations  - Instruct patient to call for assistance with activity   - Consult OT/PT to assist with strengthening/mobility   - Keep Call bell within reach  - Keep bed low and locked with side rails adjusted as appropriate  - Keep care items and personal belongings within reach  - Initiate and maintain comfort rounds  - Make Fall Risk Sign visible to staff  - Offer Toileting every 4 Hours, in advance of need  - Apply yellow socks and bracelet for high fall risk patients  - Consider moving patient to room near nurses station  Outcome: Progressing  Goal: Maintain or return to baseline ADL function  Description: INTERVENTIONS:  -  Assess patient's ability to carry out ADLs; assess patient's baseline for ADL function and identify physical deficits which impact ability to perform ADLs (bathing, care of mouth/teeth, toileting, grooming, dressing, etc )  - Assess/evaluate cause of self-care deficits   - Assess range of motion  - Assess patient's mobility; develop plan if impaired  - Assess patient's need for assistive devices and provide as appropriate  - Encourage maximum independence but intervene and supervise when necessary  - Involve family in performance of ADLs  - Assess for home care needs following discharge   - Consider OT consult to assist with ADL evaluation and planning for discharge  - Provide patient education as appropriate  Outcome: Progressing  Goal: Maintains/Returns to pre admission functional level  Description: INTERVENTIONS:  - Set and communicate daily mobility goal to care team and patient/family/caregiver     - Collaborate with rehabilitation services on mobility goals if consulted  - Out of bed for toileting  - Record patient progress and toleration of activity level   Outcome: Progressing     Problem: DISCHARGE PLANNING  Goal: Discharge to home or other facility with appropriate resources  Description: INTERVENTIONS:  - Identify barriers to discharge w/patient and caregiver  - Arrange for needed discharge resources and transportation as appropriate  - Identify discharge learning needs (meds, wound care, etc )  - Arrange for interpretive services to assist at discharge as needed  - Refer to Case Management Department for coordinating discharge planning if the patient needs post-hospital services based on physician/advanced practitioner order or complex needs related to functional status, cognitive ability, or social support system  Outcome: Progressing     Problem: Prexisting or High Potential for Compromised Skin Integrity  Goal: Skin integrity is maintained or improved  Description: INTERVENTIONS:  - Identify patients at risk for skin breakdown  - Assess and monitor skin integrity  - Assess and monitor nutrition and hydration status  - Monitor labs   - Assess for incontinence   - Turn and reposition patient  - Assist with mobility/ambulation  - Relieve pressure over bony prominences  - Avoid friction and shearing  - Provide appropriate hygiene as needed including keeping skin clean and dry  - Evaluate need for skin moisturizer/barrier cream  - Collaborate with interdisciplinary team   - Patient/family teaching  - Consider wound care consult   Outcome: Progressing     Problem: Nutrition/Hydration-ADULT  Goal: Nutrient/Hydration intake appropriate for improving, restoring or maintaining nutritional needs  Description: Monitor and assess patient's nutrition/hydration status for malnutrition  Collaborate with interdisciplinary team and initiate plan and interventions as ordered  Monitor patient's weight and dietary intake as ordered or per policy  Utilize nutrition screening tool and intervene as necessary  Determine patient's food preferences and provide high-protein, high-caloric foods as appropriate       INTERVENTIONS:  - Monitor oral intake, urinary output, labs, and treatment plans  - Assess nutrition and hydration status and recommend course of action  - Evaluate amount of meals eaten  - Assist patient with eating if necessary   - Allow adequate time for meals  - Recommend/ encourage appropriate diets, oral nutritional supplements, and vitamin/mineral supplements  - Order, calculate, and assess calorie counts as needed  - Recommend, monitor, and adjust tube feedings and TPN/PPN based on assessed needs  - Assess need for intravenous fluids  - Provide specific nutrition/hydration education as appropriate  - Include patient/family/caregiver in decisions related to nutrition  Outcome: Progressing     Problem: MOBILITY - ADULT  Goal: Maintain or return to baseline ADL function  Description: INTERVENTIONS:  -  Assess patient's ability to carry out ADLs; assess patient's baseline for ADL function and identify physical deficits which impact ability to perform ADLs (bathing, care of mouth/teeth, toileting, grooming, dressing, etc )  - Assess/evaluate cause of self-care deficits   - Assess range of motion  - Assess patient's mobility; develop plan if impaired  - Assess patient's need for assistive devices and provide as appropriate  - Encourage maximum independence but intervene and supervise when necessary  - Involve family in performance of ADLs  - Assess for home care needs following discharge   - Consider OT consult to assist with ADL evaluation and planning for discharge  - Provide patient education as appropriate  Outcome: Progressing  Goal: Maintains/Returns to pre admission functional level  Description: INTERVENTIONS:  - Set and communicate daily mobility goal to care team and patient/family/caregiver     - Collaborate with rehabilitation services on mobility goals if consulted  - Out of bed for toileting  - Record patient progress and toleration of activity level   Outcome: Progressing

## 2023-03-07 NOTE — PROGRESS NOTES
Progress Note - Infectious Disease   Raissa Petty 61 y o  male MRN: [id number]  Unit/Bed#: -01 Encounter: [id number]      Impression/Recommendations:  SIRS versus sepsis  WBC, HR, low-grade temp   Consider due to ongoing intraabdominal processes as below   Consider infection given temporal response to antibiotics   Consider tumor fever in setting of known liver metastases   Clinically stable and non-toxic  Bump in WBC today in setting of possible radha tube malfunction  Rec:  • Continue to follow closely off antibiotics  • Follow temperatures and WBC closely  • Follow drain outputs closely  • Drain check by IR today  • If develops T>101 check blood cultures and consider repeat CT A/P     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   In setting of chronic cholecystostomy tube, perihepatic abscess as below   Radha tube now with drainage consistent with this      Recent intra-abdominal abscess     In the setting complex surgical history as below   Initially developed 11/2022 accompanied by ESBL E  coli bacteremia   Status post exploratory laparotomy, right hemicolectomy, temporary abdominal closure 11/16; re-exploration, partial left colectomy, primary ileocolonic anastomosis with proximal diverting loop ileostomy, midline fascial closure on 11/17   Then developed abscess in hepatectomy bed, also collection +/- leak LUQ at area of prior colonic anastamosis   Status post IR drain into perihepatic collection 12/8   Cultures with ESBL E  Coli   Status post 7 days ertapenem   Developed recurrent sepsis and repeat CT 12/17 shows hepatic bed collection improved, persistent left intra-abdominal collection   Status post perigastic, perisplenic drains 12/20   Cultures again with ESBL E  Coli   Status post 21 days total antibiotics, completed 1/7   Left drains x2 removed 1/31   Midline hepatic bed drain removed 2/24   Collections improving off antibiotics          Possible acute cholecystitis     Status post percutaneous cholecystostomy tube   Alk phos remains markedly elevated, possibly due to drain itself versus known intrahepatic metastases      Chronic abdominal wound  In setting of prior incisional dehiscence   Status post recent debridement   Wound now appears clean      Metastatic colon cancer     To liver, possible lung   Status post resection, chemotherapy, more recent hepatic resection and ablation, transverse colon resection      CKD     Baseline Cr 1 4       DM  The above plan was discussed in detail with the primary service  Antibiotics:  None    Subjective:  Patient seen on AM rounds  Asked to see patient again do to slight bump in WBC  States has severe epigastric pain with last radha tube flush and thinks outputs may be dropping off  No output noted since 3/  Denies fevers, chills, sweats, nausea, vomiting, or diarrhea  Eating well  Upset to learn about this possible set back  24 Hour Events:  No documented fevers, chills, sweats, nausea, vomiting, or diarrhea  WBC up to 17  Objective:  Vitals:  Temp:  [97 9 °F (36 6 °C)-99 1 °F (37 3 °C)] 97 9 °F (36 6 °C)  HR:  [] 108  Resp:  [17-18] 18  BP: (115-120)/(68-75) 117/70  SpO2:  [95 %-99 %] 99 %  Temp (24hrs), Av 6 °F (37 °C), Min:97 9 °F (36 6 °C), Max:99 1 °F (37 3 °C)  Current: Temperature: 97 9 °F (36 6 °C)    Physical Exam:   General:  No acute distress  Psychiatric:  Awake and alert  Pulmonary:  Normal respiratory excursion without accessory muscle use  Abdomen:  Soft, nontender  Extremities:  No edema  Skin:  No rashes    Lab Results:  I have personally reviewed pertinent labs    Results from last 7 days   Lab Units 23  0549 23  0952 23  0633   POTASSIUM mmol/L 4 0 4 1 3 8   CHLORIDE mmol/L 105 102 102   CO2 mmol/L 17* 18* 18*   BUN mg/dL 24 26* 26*   CREATININE mg/dL 1 58* 1 66* 1 57*   EGFR ml/min/1 73sq m 47 44 47   CALCIUM mg/dL 8 4 8 1* 8 1*   AST U/L 86* 79* 82*   ALT U/L 14 12 13   ALK PHOS U/L 966* 934* 965*     Results from last 7 days   Lab Units 03/07/23  0549 03/06/23  0952 03/06/23  0633   WBC Thousand/uL 17 07* 15 85* 15 91*   HEMOGLOBIN g/dL 7 6* 7 7* 7 5*   PLATELETS Thousands/uL 351 338 352           Imaging Studies:   I have personally reviewed pertinent imaging study reports and images in PACS  EKG, Pathology, and Other Studies:   I have personally reviewed pertinent reports

## 2023-03-07 NOTE — PROGRESS NOTES
03/07/23 1255   Pain Assessment   Pain Assessment Tool 0-10   Pain Score No Pain   Restrictions/Precautions   Precautions Fall Risk;Contact/isolation;Multiple lines;Pain   Weight Bearing Restrictions No   ROM Restrictions No   Braces or Orthoses   (KRISTI, GHANSHYAM abdominal drain)   Lifestyle   Autonomy "I'd rather stay a couple extra days and be stable then go home and return back to the hospital a couple days later"   Putting On/Taking Off Footwear   Type of Assistance Needed Physical assistance   Physical Assistance Level Total assistance   Comment to don/doff socks   Putting On/Taking Off Footwear CARE Score 1   Sit to Stand   Type of Assistance Needed Supervision   Physical Assistance Level No physical assistance   Comment with RW   Sit to Stand CARE Score 4   Bed-Chair Transfer   Type of Assistance Needed Supervision   Physical Assistance Level No physical assistance   Comment with RW   Chair/Bed-to-Chair Transfer CARE Score 4   Functional Standing Tolerance   Comments initiating UE exercises in standing position however pt reports inc in lightheadedness and so remainder of exercises completed in seated position  pt tolerates standing for approx 30sec to 1 min increments before taking seated rest break  supervision provided for safety  Exercise Tools   Other Exercise Tool 1 pt engages in UE strengthening using 3# dowel, completing 4x10 of each of the following: shoulder press, shoulder flexion, chest press  pt tolerates exercises well with rest breaks in between each set to manage fatigue  strengthening completed in order to inc strength and endurance for ADLs/transfers  started exercises in standing position to inc overall stand julia/bal and overall strength, however pt reports inc in lightheadedness and so remainder of exercises completed in seated position  pt BP seated 108/74; ; BP in stance 87/57;   Cognition   Overall Cognitive Status WFL   Arousal/Participation Alert; Cooperative   Attention Within functional limits   Orientation Level Oriented X4   Memory Within functional limits   Following Commands Follows all commands and directions without difficulty   Activity Tolerance   Activity Tolerance Patient tolerated treatment well   Assessment   Treatment Assessment pt engages in 90 minute skilled OT Session focusing on func transfers with RW, UE Strengthening and standing julia/bal  see above for full func details  pt reports feeling "not as washed out" this PM and is agreeable to OT session  pt completes all func transfers and standing tasks with use of RW as pt reports feeling slightly fatigued  overall supervision level for basic transfers with RW, however w/o AD can require up to CG  pt session/standing tolerance slightly limited by low BP when in stance with pt symptomatic  BP's charted in vitals tab  pt reports spouse currently completing all meal prep/kitchen mobility tasks and so spoke with primary Aldo Foots who will remove current meal prep/kitchen mobility goals  plan to review medication management tomorrow/prior to d/c as able  recommend continued skilled care to focus on ADL retraining, func transfers with LRAD, standing julia/bal, strengh/endurance/activity tolerance, in order to decrease burden of care at d/c  Prognosis Good   Problem List Decreased strength;Decreased range of motion;Decreased endurance; Impaired balance;Decreased mobility   Barriers to Discharge Inaccessible home environment   Plan   Treatment/Interventions ADL retraining;Functional transfer training; Therapeutic exercise; Endurance training;Patient/family training;Equipment eval/education; Compensatory technique education   OT Therapy Minutes   OT Time In 1255   OT Time Out 1425   OT Total Time (minutes) 90   OT Mode of treatment - Individual (minutes) 90   OT Mode of treatment - Concurrent (minutes) 0   OT Mode of treatment - Group (minutes) 0   OT Mode of treatment - Co-treat (minutes) 0   OT Mode of Treatment - Total time(minutes) 90 minutes   OT Cumulative Minutes 690   Therapy Time missed   Time missed?  No

## 2023-03-07 NOTE — SEDATION DOCUMENTATION
Cholecystostomy tube check performed by GAURANG Frost without complication  Patient premedicated with fentanyl per Annmarie Kiser due to reported severe "12/10" abdominal pain with flushing of tube  Patient tolerated procedure well  Drainage tube left in place, capped, sent with drainage bag if needed  IR Procedure Bedrest Start Time is 6226  Report called to floor nurse

## 2023-03-07 NOTE — PROGRESS NOTES
03/07/23 0935   Pain Assessment   Pain Assessment Tool 0-10   Pain Score 5   Pain Location/Orientation Orientation: Right;Location: Abdomen   Restrictions/Precautions   Precautions Fall Risk;Contact/isolation  (ostomy, R abdominal drain)   Weight Bearing Restrictions No   ROM Restrictions No   Braces or Orthoses   (b/l TEDs)   Subjective   Subjective pt reports he did not sleep well last night, was startled awake hourly last night  Sit to Stand   Type of Assistance Needed Supervision   Comment CS with RW and no AD   Sit to Stand CARE Score 4   Bed-Chair Transfer   Type of Assistance Needed Supervision   Comment CS with SPC and RW   Chair/Bed-to-Chair Transfer CARE Score 4   Walk 10 Feet   Type of Assistance Needed Incidental touching   Comment CG/CS SPC   Walk 10 Feet CARE Score 4   Walk 50 Feet with Two Turns   Type of Assistance Needed Incidental touching   Comment CG/CS SPC   Walk 50 Feet with Two Turns CARE Score 4   Walk 150 Feet   Type of Assistance Needed Incidental touching   Comment CG/CS SPC   Walk 150 Feet CARE Score 4   Walking 10 Feet on Uneven Surfaces   Comment (S)  perform next session   Ambulation   Primary Mode of Locomotion Prior to Admission Walk   Distance Walked (feet) 180 ft   Assist Device Cane   Gait Pattern Inconsistant Josefina; Slow Josefina;Narrow LUIS   Limitations Noted In Balance; Endurance;Speed;Strength   Walk Assist Level Close Supervision   Findings initially started with CGA for safety with ambulation with use of SPC, progressed to CS with improved gait speed and step thru pattern noted  Pt able to improve gait distance with SPC this session, although please be mindful that amb distances vary greatly pending fatigue  Discussed with pt again need for RW vs SPC vs w/c upon d/c home but recommending RW at this time as pt is more stable and safer with use of RW   Does the patient walk? 2   Yes   Therapeutic Interventions   Strengthening Pt reports he does not need updated HEP as he has HEP at home  Pt will cont with previous HEP upon d/c home   Flexibility seated b/l hamstring stretching 6' total   Other HR monitored, at rest 108, with activity 113   Equipment Use   NuStep lvl 1, 5 mins, LEs only for warm up   Other Comments   Comments pt reports wife able to come prior to d/c home Friday at 10:30 a m  for brief FT sessions  Assessment   Treatment Assessment Pt engaged in 30 min skilled PT session with focus on endurance trng, LE ROM, and gait trng with SPC  Despite pt not sleeping well last night, able to amb 180' with use of SPC this session with CG/CS  Progressing with use of SPC but still recommend RW upon d/c home as well as w/c on days pt severely fatigued or if feeling symptomatic due to ortho BPs  At this time please cont with POC focusing on conditioning, strengthening, gait trng with RW and SPC, balance trng, and stair trng so pt can access his home safely  Pt to d/c home pending medical stability 3/10/23, FT s/u prior to d/c home with pt's wife  Pt has all needed equpiment for d/c home, therefore no DME needs  Problem List Decreased strength;Decreased range of motion;Decreased endurance; Impaired balance;Decreased mobility;Pain   Barriers to Discharge Inaccessible home environment;Decreased caregiver support   PT Barriers   Functional Limitation Transfers; Walking;Stair negotiation;Car transfers   Plan   Treatment/Interventions Functional transfer training;LE strengthening/ROM; Elevations; Therapeutic exercise; Endurance training;Bed mobility;Gait training   Progress Progressing toward goals   Recommendation   PT Discharge Recommendation Home with home health rehabilitation   Equipment Recommended Walker   PT Therapy Minutes   PT Time In 0935   PT Time Out 1005   PT Total Time (minutes) 30   PT Mode of treatment - Individual (minutes) 30   PT Mode of treatment - Concurrent (minutes) 0   PT Mode of treatment - Group (minutes) 0   PT Mode of treatment - Co-treat (minutes) 0   PT Mode of Treatment - Total time(minutes) 30 minutes   PT Cumulative Minutes 880   Therapy Time missed   Time missed?  No

## 2023-03-07 NOTE — PROGRESS NOTES
Internal Medicine Progress Note  Patient: Cari Gates  Age/sex: 61 y o  male  Medical Record #: [id number]      ASSESSMENT/PLAN: (Interval History)  Cari Gates is seen and examined and management for following issues:    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  • s/p diverting loop colostomy/liver biopsy with subsequent chemotherapy 2/2022  • s/p hepatic resection and reversal of colostomy on 11/7/22  • s/p ex-lap with bowel resection/VAC placement 11/16/22  • s/p right hemicolectomy with partial descending colectomy, colocolonic anastomosis with loop ileostomy and mid line abdominal VAC placement 11/17  • s/p additional drains placed for a perisplenic abscess and splenic abscess 12/20/22 (then had 3 drains plus the already existing perc radha tube)  • The 2 left lateral drains were removed by IR 1/31/23 as an OP  • Had previously been tx'd with Ertapenem  • This hospital stay had fever/leukocytosis/sepsis, started Meropenem and then to Vancomycin and Ertapenem  • Wound cx again = Ecoli ESBL  • LD Ertapenem 2/14, Vancomycin 2/16  • To IR 2/24 = Abscessogram of midline drain showed minimal persistent collection and the midline catheter was removed  • Recent wound debridement by general surgery prior to transfer = continue Santyl qd to wound  • Has chr elevated Alk phos 700's to 800s, now 900s again today  • For CMP in AM     Acute cholecystitis  • s/p percutaneous tube placement 12/8/22  • CT scan was concerning for cholecystitis --> to IR 2/24 = showed retraction of the radha tube and interval occlusion of the cystic duct   +Cholecysto enteric fistula   Radha tube was exchanged   Surgery saw 2/27/23 = stable  • Drainage from perc radha tube milky light green (20ml on 3/4/23 and nothing recorded from 3/5 or 3/6 although he says they did dump some from it on 3/5)  • For perc radha tube check today      PORT catheter  • On 2/24/23 in IR = port check showed probable small thrombus at tip of cathter, flushed/improved aspiration of the port     HTN/orthostasis/tachycardia  • D/c'd Norvasc 2 5mg qd since BPs were too low to get  • Did have some symptomatic orthostasis on 1/86/00 98 systolic and 4/16/23 with 87/57 standing (102/71 sitting)  • Continue TEDS  • On 3/1/23 AM SBP standing was 87 with HR up to 122 w/o sx and then with PT SBP 98 with sx  • Started Midodrine 2 5mg BID on 3/2/23 (give at 0600, 1100) --> held 3/3/23 AM and lunchtime since SBPs were 140-150     • HR are 90s to low 110's mostly    • TSH on 3/3/23 = 1 35     Diabetes mellitus  • Home:  Lantus 8U qam/Lispro 4U TID  • Here:  Lantus 5U qhs/Lispro 5U TID  • Has a DEXCOM meter and a regular meter at home  • Continue DM diet, QID Accuchecks/SSI  • No changes today     ALEXY/CKD III  • Baseline 1 2-1 4  • Creat was 2 5 on admission with a K+ level of 5 9  • Creat jumped to 1 66 on 3/6/23 and gave NSS x 1 liter  • Today, creat 1 58 = will give another liter NSS  • CMP in AM      Anemia  • Transfused in December and January  • Hemoglobin was 7 1 on 2/26 = transfused x 1  • Hemoglobin had dropped to 7 7 on 3/6/23  • He has had no bleeding  • Today hemoglobin stable at 7 6  • Will recheck 3/9/23     Situational depression  • Cymbalta      Left pleural effusion  • Thoracentesis 1/12 for 300ml  • Cyto from pleural fluid was negative for malignancy; cx = NG  • CT 2/22/23 = "Interval worsening of moderate right and small left pleural effusions"  • Has had no SOB  • Will watch     Hyponatremia  • Felt likely 2/2 SIADH in setting of malignancy  • On 2/27/23, added NACL 1 GM qd  • On 3/1/23, increased NACL tabs to BID, inc to 2Gm BID 3/3/23    • Will keep NACL tabs same and also is getting another liter of NSS today for hydration considering ALEXY/CKD  • CMP AM 3/8     Malnutrition  • Had Magic cups BID ordered but didn't like them so stopped  • Prefers premier protein banana flavored shake --> wife brought in   One bottle has 30 Gm protein and only 4 carbs     Low grade temp  • Was 100 3 on evening of 2/25 = afebrile since then  • Has had mild leukocytosis  • ID did see 2/27 = watching for now; if temp >101 then to check blood cxs and consider repeat CT of the abd/pelvis  • WBC is up from 15 to 17 but may be on basis of perc radha tube malfunction  • ID to see        Discharge date:  Team       The above assessment and plan was reviewed and updated as determined by my evaluation of the patient on 3/7/2023      Labs:   Results from last 7 days   Lab Units 03/07/23  0549 03/06/23  0952   WBC Thousand/uL 17 07* 15 85*   HEMOGLOBIN g/dL 7 6* 7 7*   HEMATOCRIT % 24 5* 24 8*   PLATELETS Thousands/uL 351 338     Results from last 7 days   Lab Units 03/07/23  0549 03/06/23  0952   SODIUM mmol/L 130* 128*   POTASSIUM mmol/L 4 0 4 1   CHLORIDE mmol/L 105 102   CO2 mmol/L 17* 18*   BUN mg/dL 24 26*   CREATININE mg/dL 1 58* 1 66*   CALCIUM mg/dL 8 4 8 1*             Results from last 7 days   Lab Units 03/07/23  1117 03/07/23  0602 03/06/23  2110   POC GLUCOSE mg/dl 189* 91 153*       Review of Scheduled Meds:  Current Facility-Administered Medications   Medication Dose Route Frequency Provider Last Rate   • acetaminophen  650 mg Oral Q4H PRN Marquita Faroese, DO     • aspirin  81 mg Oral Daily Marquita Faroese, DO     • atorvastatin  40 mg Oral Daily Marquita Faroese, DO     • calcium carbonate  500 mg Oral TID PRN GAURANG Zambrano     • collagenase   Topical Daily Marquita Faroese, DO     • DULoxetine  30 mg Oral Daily Marquita Faroese, DO     • enoxaparin  40 mg Subcutaneous Q24H [address] GAURANG Keith     • hydrOXYzine HCL  10 mg Oral HS Marquita Faroese, DO     • insulin glargine  5 Units Subcutaneous HS Marquita Faroese, DO     • insulin lispro  1-6 Units Subcutaneous TID AC Marquita Faroese, DO     • insulin lispro  1-6 Units Subcutaneous HS Marquita Faroese, DO     • insulin lispro  5 Units Subcutaneous TID With Meals Glena Ozzy Harleman, CRNP     • melatonin  6 mg Oral HS PRN Delayne [place], DO     • methocarbamol  500 mg Oral BID PRN Delayne [place], DO     • midodrine  2 5 mg Oral BID AC GAURANG Mccoy     • ondansetron  4 mg Intravenous Q4H PRN Delayne [place], DO     • oxyCODONE  2 5 mg Oral Q4H PRN Delayne [place], DO     • oxyCODONE  5 mg Oral Q4H PRN Delayne Arena, DO     • pantoprazole  40 mg Oral BID AC Vita [place], DO     • simethicone  80 mg Oral 4x Daily (with meals and at bedtime) Vita [place], DO     • sodium chloride  75 mL/hr Intravenous Once GAURANG Russ     • sodium chloride  2 g Oral BID With Meals GAURANG Russ     • tamsulosin  0 4 mg Oral Daily With [address] [place] [address] DO Germania         Subjective/ HPI: Patient seen and examined  Patients overnight issues or events were reviewed with nursing or staff during rounds or morning huddle session  New or overnight issues include the following:     T bili, creatinine and WBC more elevated  AST mildly elevated  Sodium 130  Offers no complaints    ROS:   A 10 point ROS was performed; negative except as noted above  Imaging:     IR cholecystostomy tube check/change/reposition/reinsertion/upsize    (Results Pending)       *Labs /Radiology studies reviewed  *Medications reviewed and reconciled as needed  *Please refer to order section for additional ordered labs studies  *Case discussed with primary attending during morning huddle case rounds    Physical Examination:  Vitals:   Vitals:    03/06/23 1305 03/06/23 1523 03/06/23 2028 03/07/23 0546   BP: 112/68 115/75 120/68 118/73   BP Location: Right arm Right arm Left arm Left arm   Pulse: (!) 117 (!) 112 103 92   Resp:  17 17 18   Temp:  98 9 °F (37 2 °C) 99 1 °F (37 3 °C) 97 9 °F (36 6 °C)   TempSrc:  Oral Oral Oral   SpO2:  98% 95% 99%   Weight:       Height:           General Appearance: no distress, conversive  HEENT:  External ear normal   Nose normal w/o drainage  Mucous membranes are moist  Oropharynx is clear  Conjunctiva clear w/o icterus or redness  Neck:  Supple, normal ROM  Lungs: BBS without crackles/wheeze/rhonchi; respirations unlabored with normal inspiratory/expiratory effort  No retractions noted  On RA  CV: regular rate and rhythm; no rubs/murmurs/gallops, PMI normal   ABD: Abdomen is soft  Bowel sounds all quadrants  Nontender with no distention  Ostomy with watery brown/yellow stool  Per radha tube has trace milky green fluid  Abd incision healing/no purulence   EXT: no edema  Skin: normal turgor, normal texture, no rashes  Psych: affect normal, mood normal  Neuro: AAO      The above physical exam was reviewed and updated as determined by my evaluation of the patient on 3/7/2023  Invasive Devices     Central Venous Catheter Line  Duration           Port A Cath 03/10/22 Right Chest 362 days          Drain  Duration           Ileostomy  days    Cholecystostomy Tube 11 days                   VTE Pharmacologic Prophylaxis: Enoxaparin  Code Status: Level 1 - Full Code  Current Length of Stay: 11 day(s)      Total time spent:  30 minutes with more than 50% spent counseling/coordinating care  Counseling includes discussion with patient re: progress  and discussion with patient of his/her current medical state/information  Coordination of patient's care was performed in conjunction with primary service  Time invested included review of patient's labs, vitals, and management of their comorbidities with continued monitoring  In addition, this patient was discussed with medical team including physician and advanced extenders  The care of the patient was extensively discussed and appropriate treatment plan was formulated unique for this patient  Medical decision making for the day was made by supervising physician unless otherwise noted in their attestation statement  ** Please Note:  voice to text software may have been used in the creation of this document   Although proof errors in transcription or interpretation are a potential of such software**

## 2023-03-07 NOTE — PROGRESS NOTES
PM&R PROGRESS NOTE:  Venkata Muñoz 61 y o  male MRN: [id number]  Unit/Bed#: -12 Encounter: [id number]        Rehabilitation Diagnosis: Impairment of mobility, safety and Activities of Daily Living (ADLs) due to Debility:  16  Debility (Non-cardiac/Non-pulmonary)    HPI: Hiral Faustin is a 61 y o  male with a history of hyperlipidemia, hypertension, GERD, ventral hernia, colon adenocarcinoma that initially presented to [facility] for an elective surgical procedure with Dr Newton Buchanan on 11/7/22  Patient presented to hem/onc on  9/22 when CT abdomen revealed transverse colonic apple core lesion and innumerable hepatic metastases  MRI revealed multiple hepati metastasis with smaller lesions in left lobe and larger lesions on the right lobe  On 11/7, patient underwent exploratory laparotomy, segment 3 liver resection, ablation, intraoperative ultrasound of the liver  This was complicated by left upper quadrant hematoma, imaging showed suspected leak from transverse colon anastomosis  On 11/16, patient underwent exploratory lap revealing hematoma, defect in transverse colon anastomosis with extravasation of succus and a dilated cecum requiring resection  He had a right hemicolectomy, left in discontinuity and temporary abdominal closure device  On 11/17, patient underwent re-exploratory, partial descending colectomy, primary colonic anastomosis created with diverting loop ileostomy and midline wound vac placement  An intra-abdominal abscess was positive for ESBL and patient completed prolonged course of antibiotics  He was discharged to SNF on 12/3 and returned to hospital on 12/4 for increased abdominal pain  Patient monitored off of antibiotics  On 12/6 patient was noted with an increased creatinine level, received IVF bolus with improvement  Patient developed tachycardia, increased abdominal pain, and incision with yellow drainage noted  CT AP showed abdominal abscess and distended gallbladder   ID was consulted, patient was continued on IV antibiotics through 12/15  Patient went to IR for percutaneous cholecystostomy tube insertion and hepatectomy abscess drainage  Nephrology consulted and initiated sodium bicarb gtt and IV albumin  On 12/14, patient midline/left chest wall pain, below abscess drain  Cardiac work-up completed  Troponin level 57 and CXR showed progressive bilateral opacities suspicious for CHF and small left pleural effusion  Cariology consulted with chest pain appearing musculoskeletal no further work up warranted  Patient was deemed medically stable and admitted to [address] [place] [address] on 12/14/22  He was discharged from [facility] on 01/17/23  Patient present to [facility] on 02/07 with increased weakness for two weeks with 2 falls noted  No injury noted  He was found to be septic with the main source of midline abdominal wound which was with foul smelling drainage  CT showed improvement in fluid collection at the left liver segment, perisplenic, left retroperitoneum, interior to the spleen  Patient was treated with broad spectrum antibiotics and eventually placed on Ertapenem  Hospital course complicated by fevers and leukocytosis  Repeat imaging concerning for cholecystitis  Patient underwent cystostomy tube exchange and Pattock drain was removed on 02/24  The patient was evaluated by the Rehabilitation team and deemed an appropriate candidate for an inpatient acute rehabilitation program  Patient was admitted on 02/24/23  SUBJECTIVE: Patient seen face to face  No acute issues overnight  Patient slept well, denies pain, mild abdominal tenderness  Patient participating in therapy with plans for IR this afternoon for a cholangiogram  No output from cholecystostomy drain  Ileostomy with liquid stool  Patient denies chest pain, shortness of breath, nausea, fever, chills       24 hour total  Cholecystostomy drain- 0 cc  Ileostomy-  500 cc    ASSESSMENT: Stable, progressing    PLAN:  -WBC 17 07, Hbg 7 6, Na+ 130, creatinine 1 58, Alk Phos 966, TB 1 77  - cholecystostomy tube check with IR  - consulted ID, recommend continued close monitoring of drain outputs, temperature, WBC  - CMP 03/08  -CBC 03/09  - Appreciate IM co-management  - IVF NS 1L    - Cholecystostomy drain check findings: diagnostic cholangiogram demonstrating filling of cystic and common bile duct, minimal pain, no leaking, slight adjustment of tube further into gallbladder body  Tube capped, can consider removal if patient passes capping trial in 2 weeks and if surgery is in agreement  - c/s Surgical Oncology, Dr Laurie Reese in agreement with IR recommendations  - c/s GI consulted, will see patient on 03/08    Rehabilitation    • Functional deficits:  Mobility, self care  • Continue current rehabilitation plan of care to maximize function  • Functional update:   • PT: mobility- independent, transfers- supervision, ambulation- min A, 75' RW   • OT: ADL-shower-incidental touching, UB set-up, LB supervision  • Estimated Discharge: anticipated 3/10 home with homecare    Pain  • Tylenol, oxycodone    DVT prophylaxis  • Lovenox    Bladder plan  • Continent    Bowel plan  • Ileostomy    Malignant neoplasm of transverse colon [facility])  Assessment & Plan  Complex history with chronological details below:    2/22: S/P diverting colostomy, liver biopsy +Chemo tx  11/7/22: Hepatic resection and colostomy reversal  11/16/22: Exploratory laparotomy with bowel resection and VAC placement   11/17/22: Right hemicolectomy, partial descending colon resection, colocolonic anastomosis with loop ileosotmy and midline VAC placement  12/20/22: Drains placed, total of 3 drains + perc radha tube + ileostomy  1/31/23: 2 of 3 drains removed  This hospital stay had fever/leukocytosis/sepsis, started Meropenem and then to Vancomycin and Ertapenem  Wound cx again = Ecoli ESBL   LD Ertapenem 2/14, Vancomycin 2/16 2/24: Abscessogram of midline drain showed minimal persistent collection and the midline catheter was removed  Perc Alvina tube exchanged  03/06- pending IR cholecystostomy tube check    • Patient does not wish to follow with [place] [facility] anymore as it is too far of a drive; would like to establish with [facility] oncology (already follows with Dr Gini Banuelos)  • Follows with Dr Brody Haddad as outpt      * Sepsis [facility])  Assessment & Plan  Noted the development of foul-smelling drainage and increasing pain from his chronic abdominal wound with leukocytosis, tachypnea, tachycardia  • Purulent drainage from abdominal wound  • Hx of colon CA w/ extensive abd surgical- adenocarcinoma of transverse colon section  • Patient has several abdominal drains in place, with CT showing improvement in the fluid collections at the left liver lobe, perisplenic area, and the left retroperitoneum inferior to the spleen  • Completed Vancomycin and Ertapenem  • S/p bedside midline wound debridement + wet dressing 2/8 with general surgery  • S/p IR tube check 2/17/2023, drains kept in place  Discussed with IR, follow up in 1 month  • Given ongoing low grade fever and worsening leukocytosis, ordered repeat blood cultures and repeat CT a/p    CT a/p showing moderate R pleural effusion, imaging concerning for cholecystitis  Discussed case with surgery, IR  IR will reposition drain today- Alvina tube check showed retraction of the alvina tube   Interval occlusion of the cystic duct   Dino-enteric fistula persisted  Exchange of alvina tube 2/24  • Continue to monitor off of antibiotics, low threshold to re-consult ID, but stable at this time  • Debility from Sepsis in addition to 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  • Pain control, anxiety management  • PT/OT  • 03/06 WBC 15 85, Hbg 7 7, Na+ 128, creatinine 1 66, total bilirubin, AST/ALT/Alk Phos elevated    • Consulted GI, deferred to IR for tube check  • IVF NS 1L  - Cholecystostomy drain check findings: diagnostic cholangiogram demonstrating filling of cystic and common bile duct, minimal pain, no leaking, slight adjustment of tube further into gallbladder body  Tube capped, can consider removal if patient passes capping trial in 2 weeks and if surgery is in agreement  - c/s Surgical Oncology, Dr Manan Fam in agreement with IR recommendations  - c/s GI consulted, will see patient on 03/08    Chronic bilateral pleural effusions  Assessment & Plan  Hx of pleural effusions with   Interval worsening of moderate right and small left pleural effusions  There is associated bibasilar atelectasis based on CTAP on 2/22  Monitor oxygen and breathing, may benefit from thoracentesis    Depression  Assessment & Plan  Continue Cymbalta  Provide supportive counseling and encouragement, easily tearful  Neuropsychology consultation for support    Cholecystitis, unspecified  Assessment & Plan  Subacute at this point, but still requiring per radha tube  • s/p percutaneous tube placement 12/8/22 asnd recent exchange on 2/24/23  • CT scan was concerning for cholecystitis --> to IR 2/24 = showed retraction of the radha tube and interval occlusion of the cystic duct   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  Radha tube was exchanged  • Monitor output and pain    Abdominal wound dehiscence  Assessment & Plan  Midline abdominal wound with initially foul smelling drainage  Now improved per patient with red granular base and slough noted  Santyl and wet to dry dressing was done in acute care  Consult wound care   Daily dressing changes    Nephrolithiasis  Assessment & Plan  RUQ US revealed 7 mm nonobstructing right intrarenal calculus  No hydronephrosis noted on CT a/p  Asymptomatic at this time     • Flomax  • Monitor outpt, symptoms    Hyponatremia  Assessment & Plan  • Mild, most recently 129 (previous 131)   • Felt likely2/2 SIADH in setting of malignancy  • Sodium chloride tab 1 BID  • Monitor BMP    Elevated alkaline phosphatase level  Assessment & Plan  Chronically elevated likely in setting of known hepatic metastasis  • RUQ US Decompressed gallbladder around a cholecystostomy tube, limiting evaluation  Hepatomegaly  Heterogeneous echotexture of the liver in this patient with known metastatic disease  7 mm nonobstructing right intrarenal calculus  • Monitor with am CMP    Leukocytosis  Assessment & Plan  WBC 13 23 (previous 13 07, 13 41)  Mildly above normal, trendiong down, however has a history of spiking and dropping  Monitor labs however need to follow clinical exam and vitals as it may spike and drop again  Monitor off antibiotics  ID following  If Temp >101 blood cultures and consider CT A/P    Abscess  Assessment & Plan  Smaller abscesses on recent studies now with no active drains  At risk for further complications  Lower theshold for abdominal CT if develops fevers or leukocytosis    Acute on chronic kidney failure [facility])  Assessment & Plan  Lab Results   Component Value Date    EGFR 56 02/24/2023    EGFR 52 02/23/2023    EGFR 52 02/22/2023    CREATININE 1 36 (H) 02/24/2023    CREATININE 1 45 (H) 02/23/2023    CREATININE 1 45 (H) 02/22/2023     • POA with creatinine 2 52; baseline 1-1 3  • Suspecting possible multifactorial cause in setting of dehydration and sepsis  • Improving back into baseline levels, Continue to monitor on labs, minimize any relative hypotension  Avoid nephrotoxic agents    • Creatinine 1 66, IV fluids, recheck BMP tomorrow    Anemia  Assessment & Plan  Hbg 9 4 (previous 8 3)  Required transfusion on 2/16  Order type and screen, 1 unit pRBC (2/26)  Monitor CBC    Mixed hyperlipidemia  Assessment & Plan  Continue statin    Benign essential hypertension  Assessment & Plan  Monitor pressures in therapy    Type 2 diabetes mellitus without complication, with long-term current use of insulin [facility])  Assessment & Plan  Lab Results   Component Value Date    HGBA1C 8 0 (H) 09/26/2022       Recent Labs     02/24/23  0839 02/24/23  1137 02/24/23  1626 02/24/23 2040 POCGLU 106 105 152* 120     • Home:  Lantus 8U qam/Lispro 4U TID  • Here:  Lantus 5U qhs/Lispro 4U TID  • Has a DEXCOM meter and a regular meter at home  • Continue DM diet and QID Accuchecks/SSI    Appreciate IM consultants medical co-management  Personally reviewed labs, medications, and imaging    ROS:  Review of Systems   A 10 point review of systems was negative except for what is noted in the HPI  OBJECTIVE:   /82 (BP Location: Right arm)   Pulse 92   Temp 98 °F (36 7 °C) (Oral)   Resp 18   Ht 5' 9" (1 753 m)   Wt 80 1 kg (176 lb 9 4 oz)   SpO2 98%   BMI 26 08 kg/m²     Physical Exam  Constitutional:       Appearance: Normal appearance  HENT:      Head: Normocephalic and atraumatic  Mouth/Throat:      Mouth: Mucous membranes are moist    Cardiovascular:      Rate and Rhythm: Normal rate and regular rhythm  Pulses: Normal pulses  Heart sounds: Normal heart sounds  Pulmonary:      Effort: Pulmonary effort is normal       Breath sounds: Normal breath sounds  Abdominal:      General: Bowel sounds are normal       Palpations: Abdomen is soft  Tenderness: There is abdominal tenderness  Comments: + cholecystostomy drain, ileostomy   Musculoskeletal:         General: Normal range of motion  Cervical back: Normal range of motion  Right lower leg: Edema present  Left lower leg: Edema present  Skin:     General: Skin is warm and dry  Capillary Refill: Capillary refill takes less than 2 seconds  Neurological:      Mental Status: He is alert and oriented to person, place, and time  Motor: Weakness present     Psychiatric:         Mood and Affect: Mood normal          Judgment: Judgment normal         Lab Results   Component Value Date    WBC 17 07 (H) 03/07/2023    HGB 7 6 (L) 03/07/2023    HCT 24 5 (L) 03/07/2023    MCV 83 03/07/2023     03/07/2023     Lab Results   Component Value Date    SODIUM 130 (L) 03/07/2023    K 4 0 03/07/2023  03/07/2023    CO2 17 (L) 03/07/2023    BUN 24 03/07/2023    CREATININE 1 58 (H) 03/07/2023    GLUC 98 03/07/2023    CALCIUM 8 4 03/07/2023     Lab Results   Component Value Date    INR 1 32 (H) 02/07/2023    INR 1 12 12/19/2022    INR 1 10 11/12/2022    PROTIME 16 1 (H) 02/07/2023    PROTIME 14 7 (H) 12/19/2022    PROTIME 14 4 11/12/2022       Current Facility-Administered Medications:   •  acetaminophen (TYLENOL) tablet 650 mg, 650 mg, Oral, Q4H PRN, Anusha Rideau, DO, 650 mg at 03/01/23 2158  •  aspirin chewable tablet 81 mg, 81 mg, Oral, Daily, Anusha Rideau, DO, 81 mg at 03/07/23 0932  •  atorvastatin (LIPITOR) tablet 40 mg, 40 mg, Oral, Daily, Anusha Rideau, DO, 40 mg at 03/07/23 0932  •  calcium carbonate (TUMS) chewable tablet 500 mg, 500 mg, Oral, TID PRN, GAURANG Keith, 500 mg at 03/01/23 1715  •  collagenase (SANTYL) ointment, , Topical, Daily, Anusha Rideau, DO, 1 application   at 03/06/23 0950  •  DULoxetine (CYMBALTA) delayed release capsule 30 mg, 30 mg, Oral, Daily, Anusha Rideau, DO, 30 mg at 03/07/23 0932  •  enoxaparin (LOVENOX) subcutaneous injection 40 mg, 40 mg, Subcutaneous, Q24H [facility], GAURANG Keith, 40 mg at 03/07/23 0940  •  hydrOXYzine HCL (ATARAX) tablet 10 mg, 10 mg, Oral, HS, Anusha Rideau, DO, 10 mg at 03/06/23 2146  •  insulin glargine (LANTUS) subcutaneous injection 5 Units 0 05 mL, 5 Units, Subcutaneous, HS, Anusha Rideau, DO, 5 Units at 03/06/23 2147  •  insulin lispro (HumaLOG) 100 units/mL subcutaneous injection 1-6 Units, 1-6 Units, Subcutaneous, TID AC, 1 Units at 03/07/23 1306 **AND** Fingerstick Glucose (POCT), , , 4x Daily AC and at bedtime, Anusha Robbins, DO  •  insulin lispro (HumaLOG) 100 units/mL subcutaneous injection 1-6 Units, 1-6 Units, Subcutaneous, HS, Anushaerin Robbins, DO, 1 Units at 03/06/23 2147  •  insulin lispro (HumaLOG) 100 units/mL subcutaneous injection 5 Units, 5 Units, Subcutaneous, TID With Meals, GAURANG Hamilton, 5 Units at 03/07/23 1640  •  melatonin tablet 6 mg, 6 mg, Oral, HS PRN, Kimberly Zendejase, DO, 6 mg at 02/28/23 2238  •  methocarbamol (ROBAXIN) tablet 500 mg, 500 mg, Oral, BID PRN, Kimberly Lame, DO, 500 mg at 03/03/23 0858  •  midodrine (PROAMATINE) tablet 2 5 mg, 2 5 mg, Oral, BID AC, GAURANG Zurita, 2 5 mg at 03/07/23 1304  •  ondansetron [facility]) injection 4 mg, 4 mg, Intravenous, Q4H PRN, Kimberly Lame, DO, 4 mg at 03/07/23 0932  •  oxyCODONE (ROXICODONE) IR tablet 2 5 mg, 2 5 mg, Oral, Q4H PRN, Kimberly Zendejase, DO  •  oxyCODONE (ROXICODONE) IR tablet 5 mg, 5 mg, Oral, Q4H PRN, Kimberly Arias, DO, 5 mg at 03/07/23 0932  •  pantoprazole (PROTONIX) EC tablet 40 mg, 40 mg, Oral, BID AC, Kimberly Lame, DO, 40 mg at 03/07/23 1639  •  simethicone (MYLICON) chewable tablet 80 mg, 80 mg, Oral, 4x Daily (with meals and at bedtime), Kimberly Zendejase, DO, 80 mg at 03/07/23 1639  •  sodium chloride tablet 2 g, 2 g, Oral, BID With Meals, GAURANG Zurita, 2 g at 03/07/23 8078  •  tamsulosin (FLOMAX) capsule 0 4 mg, 0 4 mg, Oral, Daily With Miguelina Elvis, DO, 0 4 mg at 03/07/23 1639    Past Medical History:   Diagnosis Date   • Abdominal pain 03/12/2022   • Acute renal failure ([facility] )     [id number] resolved   • Acute respiratory failure with hypoxia ([facility] ) 11/12/2022   • Bacteremia 11/12/2022   • Cancer ([facility] )    • Diabetes mellitus ([facility] )    • Enteritis 08/23/2016   • Flash pulmonary edema ([place] [address] ) 11/12/2022   • Gastroparesis due to DM ([place] [address] ) 08/23/2016   • GERD (gastroesophageal reflux disease)    • Hernia, ventral 08/04/2016   • Hyperlipidemia    • Hypertension    • Morbid obesity ([place] [address] ) 04/17/2018   • Postoperative visit 03/02/2022   • SIRS (systemic inflammatory response syndrome) ([place] [address] ) 03/12/2022   • Snoring    • Stage 3a chronic kidney disease ([place] [address] ) 02/19/2022       Patient Active Problem List    Diagnosis Date Noted   • Malignant neoplasm of transverse colon ([place] [address] ) 03/01/2022   • Sepsis ([place] [address] ) 12/17/2022   • 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 liver ([facility] ) 03/02/2022   • Abdominal wound dehiscence 02/24/2023   • Cholecystitis, unspecified 02/24/2023   • Depression 02/24/2023   • Chronic bilateral pleural effusions 02/24/2023   • Nephrolithiasis 02/09/2023   • Abnormal CT scan 02/07/2023   • Hyponatremia 02/07/2023   • Dehiscence of incision 12/30/2022   • Elevated alkaline phosphatase level 12/30/2022   • Leukocytosis 12/29/2022   • Abscess 12/27/2022   • Acute pain 12/27/2022   • Severe protein-calorie malnutrition ([place] [address] ) 12/14/2022   • Acute on chronic kidney failure ([place] [address] ) 12/14/2022   • MR (mitral regurgitation) 11/12/2022   • ESBL (extended spectrum beta-lactamase) producing bacteria infection 11/12/2022   • Encephalopathy 11/09/2022   • Cervical radiculopathy 10/26/2022   • Other fatigue 06/15/2022   • Colostomy prolapse ([place] [address] ) 05/27/2022   • Colon cancer metastasized to liver ([place] [address] ) 03/12/2022   • Iron deficiency anemia, unspecified 03/01/2022   • Thrombocytosis 02/21/2022   • Hypokalemia 02/19/2022   • Transaminitis 02/19/2022   • Type 2 diabetes mellitus with hyperlipidemia ([place] [address] ) 02/18/2022   • Anemia 02/18/2022   • Left ureteral calculus 01/30/2020   • Incarcerated umbilical hernia 25/15/2282   • Testicular hypogonadism 06/19/2017   • Low testosterone 05/30/2017   • Type 2 diabetes mellitus without complication, with long-term current use of insulin ([place] [address] ) 08/23/2016   • Benign essential hypertension 08/23/2016   • Mixed hyperlipidemia 08/23/2016   • Erectile dysfunction 07/11/2016   • Obesity (BMI 30-39 9) 07/11/2016        GAURANG Zambrano  Physical Medicine [facility]

## 2023-03-08 LAB
ALBUMIN SERPL BCP-MCNC: 1 G/DL (ref 3.5–5)
ALP SERPL-CCNC: 982 U/L (ref 46–116)
ALT SERPL W P-5'-P-CCNC: 14 U/L (ref 12–78)
ANION GAP SERPL CALCULATED.3IONS-SCNC: 7 MMOL/L (ref 4–13)
AST SERPL W P-5'-P-CCNC: 90 U/L (ref 5–45)
BILIRUB DIRECT SERPL-MCNC: 1.73 MG/DL (ref 0–0.2)
BILIRUB SERPL-MCNC: 2.03 MG/DL (ref 0.2–1)
BUN SERPL-MCNC: 22 MG/DL (ref 5–25)
CALCIUM ALBUM COR SERPL-MCNC: 10.5 MG/DL (ref 8.3–10.1)
CALCIUM SERPL-MCNC: 8.1 MG/DL (ref 8.3–10.1)
CHLORIDE SERPL-SCNC: 105 MMOL/L (ref 96–108)
CO2 SERPL-SCNC: 17 MMOL/L (ref 21–32)
CREAT SERPL-MCNC: 1.54 MG/DL (ref 0.6–1.3)
GFR SERPL CREATININE-BSD FRML MDRD: 49 ML/MIN/1.73SQ M
GLUCOSE P FAST SERPL-MCNC: 139 MG/DL (ref 65–99)
GLUCOSE SERPL-MCNC: 135 MG/DL (ref 65–140)
GLUCOSE SERPL-MCNC: 139 MG/DL (ref 65–140)
GLUCOSE SERPL-MCNC: 140 MG/DL (ref 65–140)
GLUCOSE SERPL-MCNC: 156 MG/DL (ref 65–140)
GLUCOSE SERPL-MCNC: 78 MG/DL (ref 65–140)
POTASSIUM SERPL-SCNC: 4 MMOL/L (ref 3.5–5.3)
PROT SERPL-MCNC: 6.6 G/DL (ref 6.4–8.4)
SODIUM SERPL-SCNC: 129 MMOL/L (ref 135–147)

## 2023-03-08 RX ORDER — MIDODRINE HYDROCHLORIDE 5 MG/1
5 TABLET ORAL
Status: DISCONTINUED | OUTPATIENT
Start: 2023-03-08 | End: 2023-03-14 | Stop reason: HOSPADM

## 2023-03-08 RX ORDER — SODIUM BICARBONATE 650 MG/1
650 TABLET ORAL
Status: DISCONTINUED | OUTPATIENT
Start: 2023-03-08 | End: 2023-03-08

## 2023-03-08 RX ADMIN — COLLAGENASE SANTYL: 250 OINTMENT TOPICAL at 11:13

## 2023-03-08 RX ADMIN — SODIUM CHLORIDE 2 G: 1 TABLET ORAL at 17:01

## 2023-03-08 RX ADMIN — SIMETHICONE 80 MG: 80 TABLET, CHEWABLE ORAL at 11:14

## 2023-03-08 RX ADMIN — INSULIN LISPRO 5 UNITS: 100 INJECTION, SOLUTION INTRAVENOUS; SUBCUTANEOUS at 07:37

## 2023-03-08 RX ADMIN — SIMETHICONE 80 MG: 80 TABLET, CHEWABLE ORAL at 21:43

## 2023-03-08 RX ADMIN — ENOXAPARIN SODIUM 40 MG: 40 INJECTION SUBCUTANEOUS at 08:21

## 2023-03-08 RX ADMIN — ATORVASTATIN CALCIUM 40 MG: 40 TABLET, FILM COATED ORAL at 08:20

## 2023-03-08 RX ADMIN — SIMETHICONE 80 MG: 80 TABLET, CHEWABLE ORAL at 08:22

## 2023-03-08 RX ADMIN — ASPIRIN 81 MG CHEWABLE TABLET 81 MG: 81 TABLET CHEWABLE at 08:21

## 2023-03-08 RX ADMIN — SIMETHICONE 80 MG: 80 TABLET, CHEWABLE ORAL at 17:00

## 2023-03-08 RX ADMIN — INSULIN LISPRO 5 UNITS: 100 INJECTION, SOLUTION INTRAVENOUS; SUBCUTANEOUS at 11:24

## 2023-03-08 RX ADMIN — TAMSULOSIN HYDROCHLORIDE 0.4 MG: 0.4 CAPSULE ORAL at 17:00

## 2023-03-08 RX ADMIN — SODIUM BICARBONATE: 84 INJECTION, SOLUTION INTRAVENOUS at 17:00

## 2023-03-08 RX ADMIN — PANTOPRAZOLE SODIUM 40 MG: 40 TABLET, DELAYED RELEASE ORAL at 17:00

## 2023-03-08 RX ADMIN — INSULIN LISPRO 5 UNITS: 100 INJECTION, SOLUTION INTRAVENOUS; SUBCUTANEOUS at 17:01

## 2023-03-08 RX ADMIN — MIDODRINE HYDROCHLORIDE 5 MG: 5 TABLET ORAL at 17:00

## 2023-03-08 RX ADMIN — OXYCODONE HYDROCHLORIDE 5 MG: 5 TABLET ORAL at 08:32

## 2023-03-08 RX ADMIN — INSULIN LISPRO 1 UNITS: 100 INJECTION, SOLUTION INTRAVENOUS; SUBCUTANEOUS at 11:24

## 2023-03-08 RX ADMIN — HYDROXYZINE HYDROCHLORIDE 10 MG: 10 TABLET ORAL at 21:43

## 2023-03-08 RX ADMIN — CARBIDOPA AND LEVODOPA 2.5 MG: 50; 200 TABLET, EXTENDED RELEASE ORAL at 11:14

## 2023-03-08 RX ADMIN — ONDANSETRON 4 MG: 2 INJECTION INTRAMUSCULAR; INTRAVENOUS at 08:18

## 2023-03-08 RX ADMIN — DULOXETINE HYDROCHLORIDE 30 MG: 30 CAPSULE, DELAYED RELEASE ORAL at 08:21

## 2023-03-08 RX ADMIN — SODIUM CHLORIDE 2 G: 1 TABLET ORAL at 08:22

## 2023-03-08 RX ADMIN — PANTOPRAZOLE SODIUM 40 MG: 40 TABLET, DELAYED RELEASE ORAL at 06:10

## 2023-03-08 RX ADMIN — OXYCODONE HYDROCHLORIDE 5 MG: 5 TABLET ORAL at 21:43

## 2023-03-08 NOTE — PROGRESS NOTES
PM&R PROGRESS NOTE:  Zaria Hartmann 61 y o  male MRN: [id number]  Unit/Bed#: -62 Encounter: [id number]        Rehabilitation Diagnosis: Impairment of mobility, safety and Activities of Daily Living (ADLs) due to Debility:  16  Debility (Non-cardiac/Non-pulmonary)    HPI: Dion Montero is a 61 y o  male with a history of hyperlipidemia, hypertension, GERD, ventral hernia, colon adenocarcinoma that initially presented to [facility] for an elective surgical procedure with Dr Sara Hill on 11/7/22  Patient presented to hem/onc on  9/22 when CT abdomen revealed transverse colonic apple core lesion and innumerable hepatic metastases  MRI revealed multiple hepati metastasis with smaller lesions in left lobe and larger lesions on the right lobe  On 11/7, patient underwent exploratory laparotomy, segment 3 liver resection, ablation, intraoperative ultrasound of the liver  This was complicated by left upper quadrant hematoma, imaging showed suspected leak from transverse colon anastomosis  On 11/16, patient underwent exploratory lap revealing hematoma, defect in transverse colon anastomosis with extravasation of succus and a dilated cecum requiring resection  He had a right hemicolectomy, left in discontinuity and temporary abdominal closure device  On 11/17, patient underwent re-exploratory, partial descending colectomy, primary colonic anastomosis created with diverting loop ileostomy and midline wound vac placement  An intra-abdominal abscess was positive for ESBL and patient completed prolonged course of antibiotics  He was discharged to SNF on 12/3 and returned to hospital on 12/4 for increased abdominal pain  Patient monitored off of antibiotics  On 12/6 patient was noted with an increased creatinine level, received IVF bolus with improvement  Patient developed tachycardia, increased abdominal pain, and incision with yellow drainage noted  CT AP showed abdominal abscess and distended gallbladder   ID was consulted, patient was continued on IV antibiotics through 12/15  Patient went to IR for percutaneous cholecystostomy tube insertion and hepatectomy abscess drainage  Nephrology consulted and initiated sodium bicarb gtt and IV albumin  On 12/14, patient midline/left chest wall pain, below abscess drain  Cardiac work-up completed  Troponin level 57 and CXR showed progressive bilateral opacities suspicious for CHF and small left pleural effusion  Cariology consulted with chest pain appearing musculoskeletal no further work up warranted  Patient was deemed medically stable and admitted to [address] [place] [address] on 12/14/22  He was discharged from [facility] on 01/17/23  Patient [facility] to [facility] on 02/07 with increased weakness for two weeks with 2 falls noted  No injury noted  He was found to be septic with the main source of midline abdominal wound which was with foul smelling drainage  CT showed improvement in fluid collection at the left liver segment, perisplenic, left retroperitoneum, interior to the spleen  Patient was treated with broad spectrum antibiotics and eventually placed on Ertapenem  Hospital course complicated by fevers and leukocytosis  Repeat imaging concerning for cholecystitis  Patient underwent cystostomy tube exchange and Pattock drain was removed on 02/24  The patient was evaluated by the Rehabilitation team and deemed an appropriate candidate for an inpatient acute rehabilitation program  Patient was admitted on 02/24/23  SUBJECTIVE: Patient seen face to face  No acute issues overnight  Patient reports abdominal pain at insertion site  Patient reports increased watery stool from Ileostomy  24 hour total  Cholecystostomy drain- capped  Ileostomy-  300 cc    ASSESSMENT: Stable, progressing    PLAN:  -WBC 17 07, Hbg 7 6 (03/07);  Na+ 129, creatinine 1 54, Alk Phos 982, TB 2 03 direct bilirubin 1 73   - ID following  - GI consulted, recommending MRI/ MRCP without contrast to evaluate for obstruction; trend LFT  - Nephrology consulted, sodium bicarb gtt; increase midodrine to 5 mg if needed; monitor K+   - labs CBC, CMP   -CBC 03/09  - watery stools; r/o C diff 03/08, if negative consider psyllium    Rehabilitation  Team conference: The team discussed patient's functional status, goals, and barriers  • Functional deficits:  Mobility, self care  • Continue current rehabilitation plan of care to maximize function      • Functional update:   Physical Therapy Occupational Therapy Speech Therapy   Weight Bearing Status: Full Weight Bearing  Transfers: Supervision  Bed Mobility: Independent  Amulation Distance (ft): 180 feet  Ambulation: Supervision, Incidental Touching  Assistive Device for Ambulation: Roller Walker (CG/CS spc)  Wheelchair Mobility Distance: 0 ft  Number of Stairs: 2 (completed 12 at most but required mod-maxAx1)  Assistive Device for Stairs: [address]: Incidental Touching  Ramp: Incidental Touching  Assistive Device for Ramp: Roller Walker  Discharge Recommendations: Home with:  [address] [facility] Eliesylvia Morgan with[de-identified] Family Support, First Floor Setup, Home Physical Therapy   Eating: Independent  Grooming: Independent (seated)  Bathing: Incidental Touching  Bathing: Incidental Touching  Upper Body Dressing: Supervision  Lower Body Dressing: Minimal Assistance  Toileting: Independent (to manage ostomy and use urinal)  Toilet Transfer: Minimal Assistance  Cognition: Within Defined Limits  Orientation: Person, Place, Time, Situation               • Estimated Discharge: anticipated 3/10 home with homecare    Pain  • Tylenol, oxycodone    DVT prophylaxis  • Lovenox    Bladder plan  • Continent    Bowel plan  • Ileostomy    Malignant neoplasm of transverse colon [facility])  Assessment & Plan  Complex history with chronological details below:    2/22: S/P diverting colostomy, liver biopsy +Chemo tx  11/7/22: Hepatic resection and colostomy reversal  11/16/22: Exploratory laparotomy with bowel resection and VAC placement   11/17/22: Right hemicolectomy, partial descending colon resection, colocolonic anastomosis with loop ileosotmy and midline VAC placement  12/20/22: Drains placed, total of 3 drains + perc alvina tube + ileostomy  1/31/23: 2 of 3 drains removed  This hospital stay had fever/leukocytosis/sepsis, started Meropenem and then to Vancomycin and Ertapenem  Wound cx again = Ecoli ESBL  LD Ertapenem 2/14, Vancomycin 2/16 2/24: Abscessogram of midline drain showed minimal persistent collection and the midline catheter was removed  Perc Alvina tube exchanged  03/06- pending IR cholecystostomy tube check    • Patient does not wish to follow with [facility] oncology anymore as it is too far of a drive; would like to establish with [facility] oncology (already follows with Dr Socorro Harrell)  • Follows with Dr Dheeraj Paul as outpt      * Sepsis [facility])  Assessment & Plan  Noted the development of foul-smelling drainage and increasing pain from his chronic abdominal wound with leukocytosis, tachypnea, tachycardia  • Purulent drainage from abdominal wound  • Hx of colon CA w/ extensive abd surgical- adenocarcinoma of transverse colon section  • Patient has several abdominal drains in place, with CT showing improvement in the fluid collections at the left liver lobe, perisplenic area, and the left retroperitoneum inferior to the spleen  • Completed Vancomycin and Ertapenem  • S/p bedside midline wound debridement + wet dressing 2/8 with general surgery  • S/p IR tube check 2/17/2023, drains kept in place  Discussed with IR, follow up in 1 month  • Given ongoing low grade fever and worsening leukocytosis, ordered repeat blood cultures and repeat CT a/p    CT a/p showing moderate R pleural effusion, imaging concerning for cholecystitis  Discussed case with surgery, IR  IR will reposition drain today- Alvina tube check showed retraction of the alvina tube   Interval occlusion of the cystic duct   Dino-enteric fistula persisted   Exchange of radha tube 2/24  • Continue to monitor off of antibiotics, low threshold to re-consult ID, but stable at this time  • Debility from Sepsis in addition to 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  • Pain control, anxiety management  • PT/OT  • 03/06 WBC 15 85, Hbg 7 7, Na+ 128, creatinine 1 66, total bilirubin, AST/ALT/Alk Phos elevated  • Consulted GI, deferred to IR for tube check  • IVF NS 1L  - Cholecystostomy drain check findings: diagnostic cholangiogram demonstrating filling of cystic and common bile duct, minimal pain, no leaking, slight adjustment of tube further into gallbladder body  Tube capped, can consider removal if patient passes capping trial in 2 weeks and if surgery is in agreement  - c/s Surgical Oncology, Dr Ida Freed in agreement with IR recommendations  - c/s GI consulted, recommending MRI/MRCP r/o obstruction, trend LFT  -nephrology consulted, recommending sodium bicarb gtt, increase midodrine, 5mg if needed, monitor K+    Chronic bilateral pleural effusions  Assessment & Plan  Hx of pleural effusions with   Interval worsening of moderate right and small left pleural effusions  There is associated bibasilar atelectasis based on CTAP on 2/22  Monitor oxygen and breathing, may benefit from thoracentesis    Depression  Assessment & Plan  Continue Cymbalta  Provide supportive counseling and encouragement, easily tearful  Neuropsychology consultation for support    Cholecystitis, unspecified  Assessment & Plan  Subacute at this point, but still requiring per radha tube  • s/p percutaneous tube placement 12/8/22 asnd recent exchange on 2/24/23  • CT scan was concerning for cholecystitis --> to IR 2/24 = showed retraction of the radha tube and interval occlusion of the cystic duct   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  Radha tube was exchanged    • Monitor output and pain  • 3/7 cholecystostomy drain check- advanced slightly and capped; consider removal in 2 wks if tolerates capping  • Dr Jay Aiken in agreement    Abdominal wound dehiscence  Assessment & Plan  Midline abdominal wound with initially foul smelling drainage  Now improved per patient with red granular base and slough noted  Santyl and wet to dry dressing was done in acute care  Consult wound care   Daily dressing changes    Nephrolithiasis  Assessment & Plan  RUQ US revealed 7 mm nonobstructing right intrarenal calculus  No hydronephrosis noted on CT a/p  Asymptomatic at this time  • Flomax  • Monitor outpt, symptoms    Hyponatremia  Assessment & Plan  • Mild, most recently 129 (previous 131)   • Felt likely2/2 SIADH in setting of malignancy  • Sodium chloride tab 1 BID  • Monitor BMP    Elevated alkaline phosphatase level  Assessment & Plan  Chronically elevated likely in setting of known hepatic metastasis  • RUQ US Decompressed gallbladder around a cholecystostomy tube, limiting evaluation  Hepatomegaly  Heterogeneous echotexture of the liver in this patient with known metastatic disease  7 mm nonobstructing right intrarenal calculus  • Monitor with am CMP  • GI consulted; MRI/MRCP to r/o obstruction    Leukocytosis  Assessment & Plan  WBC 17 07 (previous 13 23, 13 07, 13 41)  Mildly above normal, trendiong down, however has a history of spiking and dropping  Monitor labs however need to follow clinical exam and vitals as it may spike and drop again  Monitor off antibiotics  ID following  If Temp >101 blood cultures and consider CT A/P  R/o C diff 3/8    Abscess  Assessment & Plan  Smaller abscesses on recent studies now with no active drains  At risk for further complications    Lower theshold for abdominal CT if develops fevers or leukocytosis    Acute on chronic kidney failure [facility])  Assessment & Plan  Lab Results   Component Value Date    EGFR 56 02/24/2023    EGFR 52 02/23/2023    EGFR 52 02/22/2023    CREATININE 1 36 (H) 02/24/2023    CREATININE 1 45 (H) 02/23/2023    CREATININE 1 45 (H) 02/22/2023     • POA with creatinine 2 52; baseline 1-1 3  • Suspecting possible multifactorial cause in setting of dehydration and sepsis  • Improving back into baseline levels, Continue to monitor on labs, minimize any relative hypotension  Avoid nephrotoxic agents  • Creatinine 1 66, IV fluids, recheck BMP tomorrow    Anemia  Assessment & Plan  Hbg 7 6 (previous 7 4, 9 5, 8 3)  Required transfusion on 2/16  Order type and screen, 1 unit pRBC (2/26)  Monitor CBC    Mixed hyperlipidemia  Assessment & Plan  Continue statin    Benign essential hypertension  Assessment & Plan  Monitor pressures in therapy    Type 2 diabetes mellitus without complication, with long-term current use of insulin [facility])  Assessment & Plan  Lab Results   Component Value Date    HGBA1C 8 0 (H) 09/26/2022       Recent Labs     02/24/23  0839 02/24/23  1137 02/24/23  1626 02/24/23  2040   POCGLU 106 105 152* 120     • Home:  Lantus 8U qam/Lispro 4U TID  • Here:  Lantus 5U qhs/Lispro 4U TID  • Has a DEXCOM meter and a regular meter at home  • Continue DM diet and QID Accuchecks/SSI    Appreciate IM consultants medical co-management  Labs, medications, and imaging reviewed  ROS:  Review of Systems   A 10 point review of systems was negative except for what is noted in the HPI  OBJECTIVE:   /68 (BP Location: Left arm)   Pulse 80   Temp 98 3 °F (36 8 °C) (Oral)   Resp 16   Ht 5' 9" (1 753 m)   Wt 80 1 kg (176 lb 9 4 oz)   SpO2 96%   BMI 26 08 kg/m²     Physical Exam  Constitutional:       Appearance: Normal appearance  HENT:      Head: Normocephalic and atraumatic  Mouth/Throat:      Mouth: Mucous membranes are moist    Cardiovascular:      Rate and Rhythm: Normal rate and regular rhythm  Pulses: Normal pulses  Pulmonary:      Effort: Pulmonary effort is normal       Breath sounds: Normal breath sounds  Abdominal:      General: Bowel sounds are normal       Palpations: Abdomen is soft  Tenderness:  There is abdominal tenderness  Comments: + ileostomy, cholecystostomy drain (capped)   Musculoskeletal:         General: Normal range of motion  Cervical back: Normal range of motion  Skin:     General: Skin is warm and dry  Capillary Refill: Capillary refill takes less than 2 seconds  Comments: Abdominal wound- pink, scattered slough   Neurological:      Mental Status: He is alert and oriented to person, place, and time     Psychiatric:         Mood and Affect: Mood normal          Judgment: Judgment normal         Lab Results   Component Value Date    WBC 17 07 (H) 03/07/2023    HGB 7 6 (L) 03/07/2023    HCT 24 5 (L) 03/07/2023    MCV 83 03/07/2023     03/07/2023     Lab Results   Component Value Date    SODIUM 129 (L) 03/08/2023    K 4 0 03/08/2023     03/08/2023    CO2 17 (L) 03/08/2023    BUN 22 03/08/2023    CREATININE 1 54 (H) 03/08/2023    GLUC 139 03/08/2023    CALCIUM 8 1 (L) 03/08/2023     Lab Results   Component Value Date    INR 1 32 (H) 02/07/2023    INR 1 12 12/19/2022    INR 1 10 11/12/2022    PROTIME 16 1 (H) 02/07/2023    PROTIME 14 7 (H) 12/19/2022    PROTIME 14 4 11/12/2022       Current Facility-Administered Medications:   •  acetaminophen (TYLENOL) tablet 650 mg, 650 mg, Oral, Q4H PRN, Ann Brisk, DO, 650 mg at 03/01/23 2158  •  aspirin chewable tablet 81 mg, 81 mg, Oral, Daily, Ann Brisk, DO, 81 mg at 03/08/23 4501  •  atorvastatin (LIPITOR) tablet 40 mg, 40 mg, Oral, Daily, Ann Brisk, DO, 40 mg at 03/08/23 0820  •  calcium carbonate (TUMS) chewable tablet 500 mg, 500 mg, Oral, TID PRN, GAURANG Keith, 500 mg at 03/01/23 1715  •  collagenase (SANTYL) ointment, , Topical, Daily, Ann Brisk, DO, Given at 03/08/23 1113  •  DULoxetine (CYMBALTA) delayed release capsule 30 mg, 30 mg, Oral, Daily, Ann Brisk, DO, 30 mg at 03/08/23 8087  •  enoxaparin (LOVENOX) subcutaneous injection 40 mg, 40 mg, Subcutaneous, Q24H Jenelle ORTEGA CRNP, 40 mg at 03/08/23 2140  •  hydrOXYzine HCL (ATARAX) tablet 10 mg, 10 mg, Oral, HS, Glendale Ridges, DO, 10 mg at 03/07/23 2227  •  insulin glargine (LANTUS) subcutaneous injection 5 Units 0 05 mL, 5 Units, Subcutaneous, HS, Mayank Ridges, DO, 5 Units at 03/06/23 2147  •  insulin lispro (HumaLOG) 100 units/mL subcutaneous injection 1-6 Units, 1-6 Units, Subcutaneous, TID AC, 1 Units at 03/08/23 1124 **AND** Fingerstick Glucose (POCT), , , 4x Daily AC and at bedtime, Glendale Ridges, DO  •  insulin lispro (HumaLOG) 100 units/mL subcutaneous injection 1-6 Units, 1-6 Units, Subcutaneous, HS, Mayank Ridges, DO, 1 Units at 03/06/23 2147  •  insulin lispro (HumaLOG) 100 units/mL subcutaneous injection 5 Units, 5 Units, Subcutaneous, TID With Meals, GAURANG Solorio, 5 Units at 03/08/23 1124  •  melatonin tablet 6 mg, 6 mg, Oral, HS PRN, Glendale Ridges, DO, 6 mg at 02/28/23 2238  •  methocarbamol (ROBAXIN) tablet 500 mg, 500 mg, Oral, BID PRN, Glendale Ridges, DO, 500 mg at 03/03/23 4201  •  midodrine (PROAMATINE) tablet 5 mg, 5 mg, Oral, TID AC, GAURANG Evans  •  ondansetron [facility]) injection 4 mg, 4 mg, Intravenous, Q4H PRN, Mayank Ridges, DO, 4 mg at 03/08/23 9206  •  oxyCODONE (ROXICODONE) IR tablet 2 5 mg, 2 5 mg, Oral, Q4H PRN, Glendale Ridges, DO  •  oxyCODONE (ROXICODONE) IR tablet 5 mg, 5 mg, Oral, Q4H PRN, Mayank Ridges, DO, 5 mg at 03/08/23 4111  •  pantoprazole (PROTONIX) EC tablet 40 mg, 40 mg, Oral, BID AC, Mayank Ridges, DO, 40 mg at 03/08/23 0610  •  simethicone (MYLICON) chewable tablet 80 mg, 80 mg, Oral, 4x Daily (with meals and at bedtime), Mayank Martinez DO, 80 mg at 03/08/23 1114  •  sodium bicarbonate 75 mEq in sodium chloride 0 45 % 1,000 mL infusion, , Intravenous, Continuous, GAURANG Evans  •  sodium chloride tablet 2 g, 2 g, Oral, BID With Meals, GAURANG Graf, 2 g at 03/08/23 9206  •  tamsulosin (FLOMAX) capsule 0 4 mg, 0 4 mg, Oral, Daily With Dinner, Mayank Martinez DO, 0 4 mg at 03/07/23 1639    Past Medical History:   Diagnosis Date   • Abdominal pain 03/12/2022   • Acute renal failure ([place] [address] )     [id number] resolved   • Acute respiratory failure with hypoxia ([place] [address] ) 11/12/2022   • Bacteremia 11/12/2022   • Cancer ([place] [address] )    • Diabetes mellitus ([place] [address] )    • Enteritis 08/23/2016   • Flash pulmonary edema ([place] [address] ) 11/12/2022   • Gastroparesis due to DM ([place] [address] ) 08/23/2016   • GERD (gastroesophageal reflux disease)    • Hernia, ventral 08/04/2016   • Hyperlipidemia    • Hypertension    • Morbid obesity ([place] [address] ) 04/17/2018   • Postoperative visit 03/02/2022   • SIRS (systemic inflammatory response syndrome) ([place] [address] ) 03/12/2022   • Snoring    • Stage 3a chronic kidney disease ([place] [address] ) 02/19/2022       Patient Active Problem List    Diagnosis Date Noted   • Malignant neoplasm of transverse colon ([place] [address] ) 03/01/2022   • Sepsis ([place] [address] ) 12/17/2022   • 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 liver ([place] [address] ) 03/02/2022   • Abdominal wound dehiscence 02/24/2023   • Cholecystitis, unspecified 02/24/2023   • Depression 02/24/2023   • Chronic bilateral pleural effusions 02/24/2023   • Nephrolithiasis 02/09/2023   • Abnormal CT scan 02/07/2023   • Hyponatremia 02/07/2023   • Dehiscence of incision 12/30/2022   • Elevated alkaline phosphatase level 12/30/2022   • Leukocytosis 12/29/2022   • Abscess 12/27/2022   • Acute pain 12/27/2022   • Severe protein-calorie malnutrition ([place] [address] ) 12/14/2022   • Acute on chronic kidney failure ([place] [address] ) 12/14/2022   • MR (mitral regurgitation) 11/12/2022   • ESBL (extended spectrum beta-lactamase) producing bacteria infection 11/12/2022   • Encephalopathy 11/09/2022   • Cervical radiculopathy 10/26/2022   • Other fatigue 06/15/2022   • Colostomy prolapse ([place] [address] ) 05/27/2022   • Colon cancer metastasized to liver ([facility] ) 03/12/2022   • Iron deficiency anemia, unspecified 03/01/2022   • Thrombocytosis 02/21/2022   • Hypokalemia 02/19/2022   • Transaminitis 02/19/2022   • Type 2 diabetes mellitus with hyperlipidemia ([facility] ) 02/18/2022   • Anemia 02/18/2022   • Left ureteral calculus 01/30/2020   • Incarcerated umbilical hernia 31/87/4497   • Testicular hypogonadism 06/19/2017   • Low testosterone 05/30/2017   • Type 2 diabetes mellitus without complication, with long-term current use of insulin ([facility] ) 08/23/2016   • Benign essential hypertension 08/23/2016   • Mixed hyperlipidemia 08/23/2016   • Erectile dysfunction 07/11/2016   • Obesity (BMI 30-39 9) 07/11/2016        GAURANG Zambrano  Physical Medicine and [facility]

## 2023-03-08 NOTE — PROGRESS NOTES
Progress Note - Infectious Disease   Jaguar Mackenzie 61 y o  male MRN: [id number]  Unit/Bed#: -01 Encounter: [id number]      Impression/Recommendations:  SIRS versus sepsis  WBC, HR, low-grade temp   Consider due to ongoing intraabdominal processes as below   Consider infection given temporal response to antibiotics   Consider tumor fever in setting of known liver metastases   Clinically stable and non-toxic  Bump in WBC today in setting of possible radha tube flushing  Rec:  • Continue to follow closely off antibiotics  • Follow temperatures closely  • Recheck CBC in AM  • Follow clinical status closely now that radha drain capped  • If develops T>101 check blood cultures and consider repeat CT A/P     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  Yves Slade setting of chronic cholecystostomy tube for possible cholecystitis, perihepatic abscess as below   Radha tube now with drainage consistent with this  Repeat tube check 3/7 shows no evidence of fistula    Tube now capped      Recent intra-abdominal abscess     In the setting complex surgical history as below   Initially developed 11/2022 accompanied by ESBL E  coli bacteremia   Status post exploratory laparotomy, right hemicolectomy, temporary abdominal closure 11/16; re-exploration, partial left colectomy, primary ileocolonic anastomosis with proximal diverting loop ileostomy, midline fascial closure on 11/17   Then developed abscess in hepatectomy bed, also collection +/- leak LUQ at area of prior colonic anastamosis   Status post IR drain into perihepatic collection 12/8   Cultures with ESBL E  Coli   Status post 7 days ertapenem   Developed recurrent sepsis and repeat CT 12/17 shows hepatic bed collection improved, persistent left intra-abdominal collection   Status post perigastic, perisplenic drains 12/20   Cultures again with ESBL E  Coli   Status post 21 days total antibiotics, completed 1/7   Left drains x2 removed 1/31   Midline hepatic bed drain removed    Collections improving off antibiotics         Chronic abdominal wound  In setting of prior incisional dehiscence   Status post recent debridement   Wound now appears clean      Metastatic colon cancer     To liver, possible lung   Status post resection, chemotherapy, more recent hepatic resection and ablation, transverse colon resection  Recent rise in bilirubin, chronically elevated alk phos  Rec:  · Follow up MRI/MRCP per GI  · Recheck CMP in AM     CKD     Baseline Cr 1 4       DM      The above plan was discussed in detail with the patient      Antibiotics:  None    Subjective:  Patient seen on AM rounds  Tired today from IR procedure  Denies fevers, chills, sweats, nausea, vomiting, or diarrhea  24 Hour Events:  No documented fevers, chills, sweats, nausea, vomiting, or diarrhea  Objective:  Vitals:  Temp:  [98 °F (36 7 °C)-99 2 °F (37 3 °C)] 98 1 °F (36 7 °C)  HR:  [] 88  Resp:  [17-20] 17  BP: (112-134)/(72-82) 123/72  SpO2:  [95 %-98 %] 95 %  Temp (24hrs), Av 4 °F (36 9 °C), Min:98 °F (36 7 °C), Max:99 2 °F (37 3 °C)  Current: Temperature: 98 1 °F (36 7 °C)    Physical Exam:   General:  No acute distress  Psychiatric:  Awake and alert  Pulmonary:  Normal respiratory excursion without accessory muscle use  Abdomen:  Soft, radha tube capped  Extremities:  No edema  Skin:  No rashes    Lab Results:  I have personally reviewed pertinent labs    Results from last 7 days   Lab Units 23  0615 23  0549 23  0952   POTASSIUM mmol/L 4 0 4 0 4 1   CHLORIDE mmol/L 105 105 102   CO2 mmol/L 17* 17* 18*   BUN mg/dL 22 24 26*   CREATININE mg/dL 1 54* 1 58* 1 66*   EGFR ml/min/1 73sq m 49 47 44   CALCIUM mg/dL 8 1* 8 4 8 1*   AST U/L 90* 86* 79*   ALT U/L 14 14 12   ALK PHOS U/L 982* 966* 934*     Results from last 7 days   Lab Units 23  0549 23  0952 23  0633   WBC Thousand/uL 17 07* 15 85* 15 91*   HEMOGLOBIN g/dL 7 6* 7 7* 7 5*   PLATELETS Thousands/uL 351 338 352           Imaging Studies:   I have personally reviewed pertinent imaging study reports and images in PACS  EKG, Pathology, and Other Studies:   I have personally reviewed pertinent reports

## 2023-03-08 NOTE — PROGRESS NOTES
Internal Medicine Progress Note  Patient: Denisse Benavides  Age/sex: 61 y o  male  Medical Record #: [id number]      ASSESSMENT/PLAN: (Interval History)  Denisse Benavides is seen and examined and management for following issues:    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  • s/p diverting loop colostomy/liver biopsy with subsequent chemotherapy 2/2022  • s/p hepatic resection and reversal of colostomy on 11/7/22  • s/p ex-lap with bowel resection/VAC placement 11/16/22  • s/p right hemicolectomy with partial descending colectomy, colocolonic anastomosis with loop ileostomy and mid line abdominal VAC placement 11/17  • s/p additional drains placed for a perisplenic abscess and splenic abscess 12/20/22 (then had 3 drains plus the already existing perc radha tube)  • The 2 left lateral drains were removed by IR 1/31/23 as an OP  • Had previously been tx'd with Ertapenem  • This hospital stay had fever/leukocytosis/sepsis, started Meropenem and then to Vancomycin and Ertapenem  • Wound cx again = Ecoli ESBL  • LD Ertapenem 2/14, Vancomycin 2/16  • To IR 2/24 = Abscessogram of midline drain showed minimal persistent collection and the midline catheter was removed  • Recent wound debridement by general surgery prior to transfer = continue Santyl qd to wound  • Has chr elevated Alk phos 700's to 800s, now 900s again today     Acute cholecystitis  • s/p percutaneous tube placement 12/8/22  • CT scan was concerning for cholecystitis --> to IR 2/24 = showed retraction of the radha tube and interval occlusion of the cystic duct   +Cholecysto enteric fistula   Radha tube was exchanged   Surgery saw 2/27/23 = stable  • Drainage from perc radha tube milky light green (20ml on 3/4/23 and nothing recorded from 3/5 or 3/6 although he says they did dump some from it on 3/5)  • Perc radha tube check 3/7/23 --> slight adjustment of tube further into GB body and tube is capped = may be removed if pt passes capping trial in 2 weeks and if surgery is in agreement --> Jenelle Hutchinson from [address] TT'd Dr Hopson Flavin 3/7/23 and he was OK with that plan  • TB keeps rising and is up to 2 0 today --> GI seeing now and ordered a MRCP      PORT catheter  • On 2/24/23 in IR = port check showed probable small thrombus at tip of cathter, flushed/improved aspiration of the port     HTN/orthostasis/tachycardia  • D/c'd Norvasc 2 5mg qd since BPs were too low to get  • Did have some symptomatic orthostasis on 8/51/26 98 systolic and 7/49/74 with 87/57 standing (102/71 sitting)  • Continue TEDS  • On 3/1/23 AM SBP standing was 87 with HR up to 122 w/o sx and then with PT SBP 98 with sx  • Started Midodrine 2 5mg BID on 3/2/23 (give at 0600, 1100) --> held 3/3/23 AM and lunchtime since SBPs were 140-150     • HR are 90s to low 110's mostly    • TSH on 3/3/23 = 1 35     Diabetes mellitus  • Home:  Lantus 8U qam/Lispro 4U TID  • Here:  Lantus 5U qhs/Lispro 5U TID  • Has a DEXCOM meter and a regular meter at home  • Continue DM diet, QID Accuchecks/SSI  • Had to hold Lispro with dinner last evening 3/7/23    • No changes today     ALEXY/CKD III  • Baseline 1 2-1 4  • Creat was 2 5 on admission with a K+ level of 5 9  • Creat jumped to 1 66 on 3/6/23 and gave NSS x 1 liter  • Today, creat is still 1 54 = gave 2 liters NSS in last 2 days  • Renal to see      Anemia  • Transfused in December and January  • Hemoglobin was 7 1 on 2/26 = transfused x 1  • Hemoglobin had dropped to 7 7 on 3/6/23 and was 7 6 on 3/7/23  • He has had no bleeding  • Will recheck 3/9/23     Situational depression  • Cymbalta      Left pleural effusion  • Thoracentesis 1/12 for 300ml  • Cyto from pleural fluid was negative for malignancy; cx = NG  • CT 2/22/23 = "Interval worsening of moderate right and small left pleural effusions"  • Has had no SOB  • Will watch     Hyponatremia  • Felt likely 2/2 SIADH in setting of malignancy  • On 2/27/23, added NACL 1 GM qd  • On 3/1/23, increased NACL tabs to BID, inc to 2Gm BID 3/3/23  • Sodium is 129 even after getting 2 liters of NSS and being on NACL tabs 2Gm BID 3/3/23  • Renal to address     Malnutrition  • Had Magic cups BID ordered but didn't like them so stopped  • Prefers premier protein banana flavored shake --> wife brought in   One bottle has 30 Gm protein and only 4 carbs     Low grade temp/leukocytosis  • Was 100 3 on evening of 2/25 = afebrile since then  • WBC was up from 15 to 17  • ID following, watching  • Will get CBC in AM 3/9/23        Discharge date:  Team    The above assessment and plan was reviewed and updated as determined by my evaluation of the patient on 3/8/2023      Labs:   Results from last 7 days   Lab Units 03/07/23  0549 03/06/23  0952   WBC Thousand/uL 17 07* 15 85*   HEMOGLOBIN g/dL 7 6* 7 7*   HEMATOCRIT % 24 5* 24 8*   PLATELETS Thousands/uL 351 338     Results from last 7 days   Lab Units 03/08/23  0615 03/07/23  0549   SODIUM mmol/L 129* 130*   POTASSIUM mmol/L 4 0 4 0   CHLORIDE mmol/L 105 105   CO2 mmol/L 17* 17*   BUN mg/dL 22 24   CREATININE mg/dL 1 54* 1 58*   CALCIUM mg/dL 8 1* 8 4             Results from last 7 days   Lab Units 03/08/23  1031 03/08/23  0635 03/07/23  2234   POC GLUCOSE mg/dl 156* 140 84       Review of Scheduled Meds:  Current Facility-Administered Medications   Medication Dose Route Frequency Provider Last Rate   • acetaminophen  650 mg Oral Q4H PRN Moose Ley, DO     • aspirin  81 mg Oral Daily Thensuellen Ley, DO     • atorvastatin  40 mg Oral Daily Thensuellen Ley, DO     • calcium carbonate  500 mg Oral TID PRN GAURANG Fernández     • collagenase   Topical Daily Thensuellen Ley, DO     • DULoxetine  30 mg Oral Daily Thensuellen Ley, DO     • enoxaparin  40 mg Subcutaneous Q24H [address] GAURANG Keith     • hydrOXYzine HCL  10 mg Oral HS Moose Ley DO     • insulin glargine  5 Units Subcutaneous HS Thena [place], DO     • insulin lispro  1-6 Units Subcutaneous TID AC Moose Ley, DO     • insulin lispro  1-6 Units Subcutaneous HS Thensuellen Ley, DO     • insulin lispro  5 Units Subcutaneous TID With Meals GAURANG Rosa     • melatonin  6 mg Oral HS PRN Moose Ley, DO     • methocarbamol  500 mg Oral BID PRN Moose Ley, DO     • midodrine  2 5 mg Oral BID AC GAURANG Salas     • ondansetron  4 mg Intravenous Q4H PRN Moose Ley, DO     • oxyCODONE  2 5 mg Oral Q4H PRN Moose Ley, DO     • oxyCODONE  5 mg Oral Q4H PRN Moose Ley, DO     • pantoprazole  40 mg Oral BID AC Moose Ley DO     • simethicone  80 mg Oral 4x Daily (with meals and at bedtime) Moose Ley, DO     • sodium chloride  2 g Oral BID With Meals GAURANG Rosa     • tamsulosin  0 4 mg Oral Daily With [address] [place] [address] DO Germania         Subjective/ HPI: Patient seen and examined  Patients overnight issues or events were reviewed with nursing or staff during rounds or morning huddle session  New or overnight issues include the following:     No overnight issues  Offers no complaints    ROS:   A 10 point ROS was performed; negative except as noted above         Imaging:     IR cholecystostomy tube check/change/reposition/reinsertion/upsize    (Results Pending)   MRI inpatient order    (Results Pending)       *Labs /Radiology studies reviewed  *Medications reviewed and reconciled as needed  *Please refer to order section for additional ordered labs studies  *Case discussed with primary attending during morning huddle case rounds    Physical Examination:  Vitals:   Vitals:    03/07/23 1626 03/07/23 2042 03/08/23 0559 03/08/23 1128   BP: 129/82 134/76 133/79 123/72   BP Location: Right arm Left arm Left arm Right arm   Pulse: 92 104 88    Resp: 18 20 17    Temp: 98 °F (36 7 °C) 99 2 °F (37 3 °C) 98 1 °F (36 7 °C)    TempSrc: Oral Oral Oral    SpO2: 98% 95% 95%    Weight:       Height:           General Appearance: no distress, conversive  HEENT: PERRLA, conjuctiva normal; oropharynx clear; mucous membranes moist   Neck:  Supple, normal ROM  Lungs: CTA, normal respiratory effort, no retractions, expiratory effort normal  CV: regular rate and rhythm; no rubs/murmurs/gallops, PMI normal   ABD: soft; ND/NT; +BS; ileostomy with watery yellow stool  EXT: no edema  Skin: normal turgor, normal texture, no rashes  Psych: affect normal, mood normal  Neuro: AAO      The above physical exam was reviewed and updated as determined by my evaluation of the patient on 3/8/2023  Invasive Devices     Central Venous Catheter Line  Duration           Port A Cath 03/10/22 Right Chest 363 days          Drain  Duration           Ileostomy  days    Cholecystostomy Tube 12 days                   VTE Pharmacologic Prophylaxis: Enoxaparin  Code Status: Level 1 - Full Code  Current Length of Stay: 12 day(s)      Total time spent:  30 minutes with more than 50% spent counseling/coordinating care  Counseling includes discussion with patient re: progress  and discussion with patient of his/her current medical state/information  Coordination of patient's care was performed in conjunction with primary service  Time invested included review of patient's labs, vitals, and management of their comorbidities with continued monitoring  In addition, this patient was discussed with medical team including physician and advanced extenders  The care of the patient was extensively discussed and appropriate treatment plan was formulated unique for this patient  Medical decision making for the day was made by supervising physician unless otherwise noted in their attestation statement  ** Please Note:  voice to text software may have been used in the creation of this document   Although proof errors in transcription or interpretation are a potential of such software**

## 2023-03-08 NOTE — PLAN OF CARE
Problem: PAIN - ADULT  Goal: Verbalizes/displays adequate comfort level or baseline comfort level  Description: Interventions:  - Encourage patient to monitor pain and request assistance  - Assess pain using appropriate pain scale  - Administer analgesics based on type and severity of pain and evaluate response  - Implement non-pharmacological measures as appropriate and evaluate response  - Consider cultural and social influences on pain and pain management  - Notify physician/advanced practitioner if interventions unsuccessful or patient reports new pain  Outcome: Progressing     Problem: INFECTION - ADULT  Goal: Absence or prevention of progression during hospitalization  Description: INTERVENTIONS:  - Assess and monitor for signs and symptoms of infection  - Monitor lab/diagnostic results  - Monitor all insertion sites, i e  indwelling lines, tubes, and drains  - Monitor endotracheal if appropriate and nasal secretions for changes in amount and color  - [place] appropriate cooling/warming therapies per order  - Administer medications as ordered  - Instruct and encourage patient and family to use good hand hygiene technique  - Identify and instruct in appropriate isolation precautions for identified infection/condition  Outcome: Progressing  Goal: Absence of fever/infection during neutropenic period  Description: INTERVENTIONS:  - Monitor WBC    Outcome: Progressing     Problem: SAFETY ADULT  Goal: Patient will remain free of falls  Description: INTERVENTIONS:  - Educate patient/family on patient safety including physical limitations  - Instruct patient to call for assistance with activity   - Consult OT/PT to assist with strengthening/mobility   - Keep Call bell within reach  - Keep bed low and locked with side rails adjusted as appropriate  - Keep care items and personal belongings within reach  - Initiate and maintain comfort rounds  - Make Fall Risk Sign visible to staff  - Apply yellow socks and bracelet for high fall risk patients  - Consider moving patient to room near nurses station  Outcome: Progressing  Goal: Maintain or return to baseline ADL function  Description: INTERVENTIONS:  -  Assess patient's ability to carry out ADLs; assess patient's baseline for ADL function and identify physical deficits which impact ability to perform ADLs (bathing, care of mouth/teeth, toileting, grooming, dressing, etc )  - Assess/evaluate cause of self-care deficits   - Assess range of motion  - Assess patient's mobility; develop plan if impaired  - Assess patient's need for assistive devices and provide as appropriate  - Encourage maximum independence but intervene and supervise when necessary  - Involve family in performance of ADLs  - Assess for home care needs following discharge   - Consider OT consult to assist with ADL evaluation and planning for discharge  - Provide patient education as appropriate  Outcome: Progressing  Goal: Maintains/Returns to pre admission functional level  Description: INTERVENTIONS:  - Perform BMAT or MOVE assessment daily    - Set and communicate daily mobility goal to care team and patient/family/caregiver     - Collaborate with rehabilitation services on mobility goals if consulted  - Out of bed for toileting  - Record patient progress and toleration of activity level   Outcome: Progressing     Problem: DISCHARGE PLANNING  Goal: Discharge to home or other facility with appropriate resources  Description: INTERVENTIONS:  - Identify barriers to discharge w/patient and caregiver  - Arrange for needed discharge resources and transportation as appropriate  - Identify discharge learning needs (meds, wound care, etc )  - Arrange for interpretive services to assist at discharge as needed  - Refer to Case Management Department for coordinating discharge planning if the patient needs post-hospital services based on physician/advanced practitioner order or complex needs related to functional status, cognitive ability, or social support system  Outcome: Progressing     Problem: Prexisting or High Potential for Compromised Skin Integrity  Goal: Skin integrity is maintained or improved  Description: INTERVENTIONS:  - Identify patients at risk for skin breakdown  - Assess and monitor skin integrity  - Assess and monitor nutrition and hydration status  - Monitor labs   - Assess for incontinence   - Turn and reposition patient  - Assist with mobility/ambulation  - Relieve pressure over bony prominences  - Avoid friction and shearing  - Provide appropriate hygiene as needed including keeping skin clean and dry  - Evaluate need for skin moisturizer/barrier cream  - Collaborate with interdisciplinary team   - Patient/family teaching  - Consider wound care consult   Outcome: Progressing     Problem: Nutrition/Hydration-ADULT  Goal: Nutrient/Hydration intake appropriate for improving, restoring or maintaining nutritional needs  Description: Monitor and assess patient's nutrition/hydration status for malnutrition  Collaborate with interdisciplinary team and initiate plan and interventions as ordered  Monitor patient's weight and dietary intake as ordered or per policy  Utilize nutrition screening tool and intervene as necessary  Determine patient's food preferences and provide high-protein, high-caloric foods as appropriate       INTERVENTIONS:  - Monitor oral intake, urinary output, labs, and treatment plans  - Assess nutrition and hydration status and recommend course of action  - Evaluate amount of meals eaten  - Assist patient with eating if necessary   - Allow adequate time for meals  - Recommend/ encourage appropriate diets, oral nutritional supplements, and vitamin/mineral supplements  - Order, calculate, and assess calorie counts as needed  - Recommend, monitor, and adjust tube feedings and TPN/PPN based on assessed needs  - Assess need for intravenous fluids  - Provide specific nutrition/hydration education as appropriate  - Include patient/family/caregiver in decisions related to nutrition  Outcome: Progressing     Problem: MOBILITY - ADULT  Goal: Maintain or return to baseline ADL function  Description: INTERVENTIONS:  -  Assess patient's ability to carry out ADLs; assess patient's baseline for ADL function and identify physical deficits which impact ability to perform ADLs (bathing, care of mouth/teeth, toileting, grooming, dressing, etc )  - Assess/evaluate cause of self-care deficits   - Assess range of motion  - Assess patient's mobility; develop plan if impaired  - Assess patient's need for assistive devices and provide as appropriate  - Encourage maximum independence but intervene and supervise when necessary  - Involve family in performance of ADLs  - Assess for home care needs following discharge   - Consider OT consult to assist with ADL evaluation and planning for discharge  - Provide patient education as appropriate  Outcome: Progressing  Goal: Maintains/Returns to pre admission functional level  Description: INTERVENTIONS:  - Perform BMAT or MOVE assessment daily    - Set and communicate daily mobility goal to care team and patient/family/caregiver     - Collaborate with rehabilitation services on mobility goals if consulted  - Out of bed for toileting  - Record patient progress and toleration of activity level   Outcome: Progressing

## 2023-03-08 NOTE — TEAM CONFERENCE
Acute [facility] Conference Note  Date: 3/8/2023   Time: 11:15 AM       Patient Name:  Girma Herrera       Medical Record Number: [id number]   YOB: 1965  Sex: Male          Room/Bed:  /[facility] 648-74  Payor Info:  Payor: Danica Barbone / Plan: Danica Barbone / Product Type: HMO Commercial /      Admitting Diagnosis: Debility [R53 81]   Admit Date/Time:  2/24/2023  2:58 PM  Admission Comments: No comment available     Primary Diagnosis:  Sepsis ([facility] )  Principal Problem: Sepsis [facility])    Patient Active Problem List    Diagnosis Date Noted   • Abdominal wound dehiscence 02/24/2023   • Cholecystitis, unspecified 02/24/2023   • Depression 02/24/2023   • Chronic bilateral pleural effusions 02/24/2023   • Nephrolithiasis 02/09/2023   • Abnormal CT scan 02/07/2023   • Hyponatremia 02/07/2023   • Dehiscence of incision 12/30/2022   • Elevated alkaline phosphatase level 12/30/2022   • Leukocytosis 12/29/2022   • Abscess 12/27/2022   • Acute pain 12/27/2022   • Sepsis ([facility] ) 12/17/2022   • Severe protein-calorie malnutrition ([facility] ) 12/14/2022   • Acute on chronic kidney failure ([facility] ) 12/14/2022   • MR (mitral regurgitation) 11/12/2022   • ESBL (extended spectrum beta-lactamase) producing bacteria infection 11/12/2022   • Encephalopathy 11/09/2022   • Cervical radiculopathy 10/26/2022   • Other fatigue 06/15/2022   • Colostomy prolapse ([facility] ) 05/27/2022   • Colon cancer metastasized to liver ([facility] ) 03/12/2022   • 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 liver ([facility] ) 03/02/2022   • Iron deficiency anemia, unspecified 03/01/2022   • Malignant neoplasm of transverse colon ([facility] ) 03/01/2022   • Thrombocytosis 02/21/2022   • Hypokalemia 02/19/2022   • Transaminitis 02/19/2022   • Type 2 diabetes mellitus with hyperlipidemia ([facility] ) 02/18/2022   • Anemia 02/18/2022   • Left ureteral calculus 01/30/2020   • Incarcerated umbilical hernia 55/52/5698   • Testicular hypogonadism 06/19/2017   • Low testosterone 05/30/2017   • Type 2 diabetes mellitus without complication, with long-term current use of insulin ([facility] ) 08/23/2016   • Benign essential hypertension 08/23/2016   • Mixed hyperlipidemia 08/23/2016   • Erectile dysfunction 07/11/2016   • Obesity (BMI 30-39 9) 07/11/2016       Physical Therapy:    Weight Bearing Status: Full Weight Bearing  Transfers: Supervision  Bed Mobility: Independent  Amulation Distance (ft): 180 feet  Ambulation: Supervision, Incidental Touching  Assistive Device for Ambulation: Roller Walker (CG/CS spc)  Wheelchair Mobility Distance: 0 ft  Number of Stairs: 2 (completed 12 at most but required mod-maxAx1)  Assistive Device for Stairs: [address]: Incidental Touching  Ramp: Incidental Touching  Assistive Device for Ramp: Roller Walker  Discharge Recommendations: Home with:  [address] with[de-identified] Family Support, First Floor Setup, Home Physical Therapy    Pt moving slow but much better in skilled PT this time   Pt will cont to need strengthening and conditioning to meet DC goals   Luz Maria Brito Pt is a good rehab candidate with ELOS 2 weeks and goals set at [place]  POC to focus on gait training, transfer training, stair training, neuromuscular re-education, improving pt's activity tolerance and endurance, LE strengthening, balance interventions and assessment for appropriate AD  Cont POC toward DC goals     3/7/23  Pt is making fair progress towards LTGs  Although pt would not like to use RW at d/c recommending for pt safety but if family present and mobilizing with pt and pt feels well can use SPC in home  Recommending FF s/u upon d/c as pt inconsistent with stair trng due to ongoing fatigue and pain, recommend pt cont to work on stairs with home PT and place chairs on landings for pt safety  Pt has HEP from previous stay and will cont with this upon d/c  No DME needs at this time   Pending medical stability d/c planned for 3/10/23 with recommendations for home PT to further improve pt's balance, endurance, and strength to maximize his independence with LRAD and reduce risk for falls as well as caregiver burden  Occupational Therapy:  Eating: Independent  Grooming: Independent (seated)  Bathing: Incidental Touching  Bathing: Incidental Touching  Upper Body Dressing: Supervision  Lower Body Dressing: Minimal Assistance  Toileting: Independent (to manage ostomy and use urinal)  Toilet Transfer: Minimal Assistance  Cognition: Within Defined Limits  Orientation: Person, Place, Time, Situation  Discharge Recommendations: Home with:  [address] with[de-identified] Family Support       Pt is demonstrating good progress with occupational therapy and is progressing toward long term goals for ADL, IADL, and functional transfers/mobility  Pts long term goals for ADLs are Independent with Rolling Walker  Pt continues to present with impairments in activity tolerance, endurance, standing balance/tolerance, and coping skills  Occupational performance remains limited by fatigue, SOB, decreased caregiver support, risk for falls, and home environment  Pt will continue to benefit from skilled acute rehab OT services to address above mentioned barriers and maximize functional independence in baseline areas of occupation to meet established treatment goals with overall decreased burden of care  Plan of care to continue to focus on ADL Retraining, LHAE education/training, IADL training, Functional Transfers, Functional Cognition, Standing tolerance, Standing balance, DME training/education, Family training/education, Energy conservation training/education, healthy coping education, Leisure and social pursuits, and community re-integration  Over the last week, pt demo's progress with self care tasks, func transfers with/w/o RW  Goals for the upcoming week are: increasing overall functional activity tolerance, increase standing tolerance for self care tasks, increase strength and endurance         D/C date: Tentatively Friday, 3/10/23; no additional OT needs at this time  Speech Therapy:           No notes on file    Nursing Notes:  Appetite: Poor  Diet Type: Other (Specify) (gisela/ch)                      Diet Patient/Family Education Complete: No    Type of Wound (LDA): Wound                    Type of Wound Patient/Family Education: Yes  Bladder:  (ileostomy)     Bladder Patient/Family Education: Yes  Bowel: Continent     Bowel Patient/Family Education: Yes  Pain Location/Orientation: Orientation: Bilateral, Location: Abdomen  Pain Score: 0                       Hospital Pain Intervention(s): Medication (See MAR)  Pain Patient/Family Education: Yes  Medication Management/Safety  Injectable: Lovenox  Safe Administration: Yes (by staff)  Medication Patient/Family Education Complete: No (ongoing)      ASSESSMENT/PLAN: (Interval History)  Mitesh Mercer is seen and examined and management for following issues:     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  • s/p diverting loop colostomy/liver biopsy with subsequent chemotherapy 2/2022  • s/p hepatic resection and reversal of colostomy on 11/7/22  • s/p ex-lap with bowel resection/VAC placement 11/16/22  • s/p right hemicolectomy with partial descending colectomy, colocolonic anastomosis with loop ileostomy and mid line abdominal VAC placement 11/17  • s/p additional drains placed for a perisplenic abscess and splenic abscess 12/20/22 (then had 3 drains plus the already existing perc radha tube)  • The 2 left lateral drains were removed by IR 1/31/23 as an OP  • Had previously been tx'd with Ertapenem  • This hospital stay had fever/leukocytosis/sepsis, started Meropenem and then to Vancomycin and Ertapenem  • Wound cx again = Ecoli ESBL  • LD Ertapenem 2/14, Vancomycin 2/16  • To IR 2/24 = Abscessogram of midline drain showed minimal persistent collection and the midline catheter was removed    • Recent wound debridement by general surgery prior to transfer = continue Santyl qd to wound  • Has chr elevated Alk phos 700's to 800s, now 900s again today  • For CMP in AM     Acute cholecystitis  • s/p percutaneous tube placement 12/8/22  • CT scan was concerning for cholecystitis --> to IR 2/24 = showed retraction of the radha tube and interval occlusion of the cystic duct  +Cholecysto enteric fistula  Radha tube was exchanged  Surgery saw 2/27/23 = stable  • Drainage from perc radha tube milky light green (20ml on 3/4/23 and nothing recorded from 3/5 or 3/6 although he says they did dump some from it on 3/5)  • For perc radha tube check today  PORT catheter  • On 2/24/23 in IR = port check showed probable small thrombus at tip of cathter, flushed/improved aspiration of the port     HTN/orthostasis/tachycardia  • D/c'd Norvasc 2 5mg qd since BPs were too low to get  • Did have some symptomatic orthostasis on 8/83/88 98 systolic and 9/55/68 with 87/57 standing (102/71 sitting)  • Continue TEDS  • On 3/1/23 AM SBP standing was 87 with HR up to 122 w/o sx and then with PT SBP 98 with sx  • Started Midodrine 2 5mg BID on 3/2/23 (give at 0600, 1100) --> held 3/3/23 AM and lunchtime since SBPs were 140-150  • HR are 90s to low 110's mostly    • TSH on 3/3/23 = 1 35     Diabetes mellitus  • Home:  Lantus 8U qam/Lispro 4U TID  • Here:  Lantus 5U qhs/Lispro 5U TID     • Has a DEXCOM meter and a regular meter at home  • Continue DM diet, QID Accuchecks/SSI  • No changes today     ALEXY/CKD III  • Baseline 1 2-1 4  • Creat was 2 5 on admission with a K+ level of 5 9  • Creat jumped to 1 66 on 3/6/23 and gave NSS x 1 liter  • Today, creat 1 58 = will give another liter NSS  • CMP in AM      Anemia  • Transfused in December and January  • Hemoglobin was 7 1 on 2/26 = transfused x 1  • Hemoglobin had dropped to 7 7 on 3/6/23  • He has had no bleeding  • Today hemoglobin stable at 7 6  • Will recheck 3/9/23   Situational depression  • Cymbalta      Left pleural effusion  • Thoracentesis 1/12 for 300ml  • Cyto from pleural fluid was negative for malignancy; cx = NG  • CT 2/22/23 = "Interval worsening of moderate right and small left pleural effusions"  • Has had no SOB  • Will watch     Hyponatremia  • Felt likely 2/2 SIADH in setting of malignancy  • On 2/27/23, added NACL 1 GM qd  • On 3/1/23, increased NACL tabs to BID, inc to 2Gm BID 3/3/23  • Will keep NACL tabs same and also is getting another liter of NSS today for hydration considering ALEXY/CKD  • CMP AM 3/8     Malnutrition  • Had Magic cups BID ordered but didn't like them so stopped  • Prefers premier protein banana flavored shake --> wife brought in  One bottle has 30 Gm protein and only 4 carbs     Low grade temp  • Was 100 3 on evening of 2/25 = afebrile since then  • Has had mild leukocytosis  • ID did see 2/27 = watching for now; if temp >101 then to check blood cxs and consider repeat CT of the abd/pelvis  • WBC is up from 15 to 17 but may be on basis of perc radha tube malfunction  • ID to see        Discharge date:  Team    This week we will encourage independence with ADLs  We will monitor labs and vital signs  We will educate pt/family about repositioning to prevent skin breakdown  We will assist w repositioning and  perform routine skin checks  We will monitor for adequate pain control  We will monitor for constipation and medicate as ordered  We will increase safety awareness with transfers and keep pt free from falls  Case Management:     Discharge Planning  Living Arrangements: Lives w/ Children, Lives w/ Spouse/significant other  Support Systems: Spouse/significant other, Children  Assistance Needed: yes  Type of Current Residence: Private residence  Current Home Care Services: Yes  Pt continues to participate with therapy and is scheduled to return home later this week  [facility] services are in place through [facility]  Following for additional dc needs  Is the patient actively participating in therapies? yes  List any modifications to the treatment plan:     Barriers Interventions   Medical status, laura muñoz elevated Id cosulted  Ir tube check completed, gi and renal following, mrcp suggested   Fatigue, endurance Therapy exercises, w/c at home for safety   Orthostatic bp's Patient aware and notifies staff             Is the patient making expected progress toward goals? yes  List any update or changes to goals:     Medical Goals: Patient will be medically stable for discharge to [facility] restrictive envrionment upon completion of rehab program and Patient will be able to manage medical conditions and comorbid conditions with medications and follow up upon completion of rehab program    Weekly Team Goals:   Rehab Team Goals  ADL Team Goal: Patient will be independent with ADLs with least restrictive device upon completion of rehab program  Bowel/Bladder Team Goal: Patient will require supervision with bladder/bowel management with least restrictive device upon completion of rehab program  Transfer Team Goal: Patient will be independent with transfers with least restrictive device upon completion of rehab program  Locomotion Team Goal: Patient will be independent with locomotion with least restrictive device upon completion of rehab program    Discussion: pt presents with the above barriers and wife is scheduled for family training prior to dc Friday morning  Pt is recommended to function at a w/c level on dc due to orthostatic bp's and fatigue  Overall pt is close supervision with a cane and supervision with a walker  Overall adls are contact guard  As long as pt is medically stable dc is scheduled for Friday with [facility] services for rn and pt  Anticipated Discharge Date:  3/10/2023 pending medical stability  [facility] Team Members Present: The following team members are supervising care for this patient and were present during this Weekly Team Conference      Physician: Dr Mariia Spaulding MD  : Kaity Paredes, MSW  Registered Nurse: Doug Wilder RN  Physical Therapist: Davion Schrader DPT  Occupational Therapist: Rita Downs MS, OTR/L  Speech Therapist:

## 2023-03-08 NOTE — PROGRESS NOTES
BG 71, improved to 81 with snack  Dr Ludie Castleman with Internal Medicine aware  Verbal order received to hold 5 units Lantus  Will continue to monitor

## 2023-03-08 NOTE — CASE MANAGEMENT
Met w/pt and reviewed team update and confirmed dc for 3/10 as long as pt is medically stable  Confirmed arrangements made with traditional home health for contd services at home  Clinical update faxed to Maria L Tyler at [facility] via [employer] [phone] requesting contd stay approval as no response received yet from update faxed on 3/1

## 2023-03-08 NOTE — PROGRESS NOTES
03/08/23 1230   Restrictions/Precautions   Precautions Fall Risk;Contact/isolation;Multiple lines;Pain   Weight Bearing Restrictions No   ROM Restrictions No   Braces or Orthoses   (ostomy, ALVARO, R abdominal drain)   Grooming   Findings pt performs func mob in room with RW to complete hand hygiene in stance at sink, overall IND level  Sit to Stand   Type of Assistance Needed Independent   Physical Assistance Level No physical assistance   Comment RW   Sit to Stand CARE Score 6   Bed-Chair Transfer   Type of Assistance Needed Independent   Physical Assistance Level No physical assistance   Comment RW   Chair/Bed-to-Chair Transfer CARE Score 6   Toileting Hygiene   Type of Assistance Needed Set-up / clean-up   Physical Assistance Level No physical assistance   Comment (S)  pt able to empty ostomy and perform wiping of ostomy however does require clean up assist at this time for emptying filled canister  pt would benefit from placing chair beside toilet with supplies needed (wipes, wet wipes and canister) and have pt sit on toilet to complete ostomy care so pt would be able to empty ostomy into canister, measure output and then empty into toilet  plan to complete/assess tomorrow as able  Toileting Hygiene CARE Score 5   Exercise Tools   UE Ergometer pt engages in UE Strengthening using [employer] UBE, completing 4 sets of 2 5 minutes focusing on increasing strength and endurance for ADLs/transfers  good tolerance to exercises with rest breaks in between each set to manage fatigue  Cognition   Overall Cognitive Status WFL   Arousal/Participation Alert; Cooperative   Attention Within functional limits   Orientation Level Oriented X4   Memory Within functional limits   Following Commands Follows all commands and directions without difficulty   Activity Tolerance   Activity Tolerance Patient tolerated treatment well   Medical Staff Made Aware RN notified of ostomy output   Assessment   Treatment Assessment pt engages in 90 minute skilled OT session focusing on func transfers, strength/endurance, ostomy care  see above for full func details  pt continues to demo good progress toward OT goals, with progression to IRPs in AM PT session  pt demo's ability to empty ostomy post set up (however could retrieve all items as pt is IND in room), does require clean up assist to empty filled canister  plan to trial placing chair beside toilet with set up of supplies for more IND with set up/clean up of ostomy care  pt's spouse present during OT session and very pleased with pt progress thus far  pt tentatively scheduled for d/c home Friday pending medical stability  recommend continued skilled care to focus on ADL retraining including ostomy care, func transfers, standing julia/bal, strength/endurance, in order to decrease burden of care at d/c  Prognosis Good   Problem List Decreased strength;Decreased endurance; Impaired balance;Decreased mobility   Barriers to Discharge Inaccessible home environment   Plan   Treatment/Interventions ADL retraining;Functional transfer training; Therapeutic exercise; Endurance training;Patient/family training;Equipment eval/education; Compensatory technique education   OT Therapy Minutes   OT Time In 1230   OT Time Out 1400   OT Total Time (minutes) 90   OT Mode of treatment - Individual (minutes) 90   OT Mode of treatment - Concurrent (minutes) 0   OT Mode of treatment - Group (minutes) 0   OT Mode of treatment - Co-treat (minutes) 0   OT Mode of Treatment - Total time(minutes) 90 minutes   OT Cumulative Minutes 780   Therapy Time missed   Time missed?  No

## 2023-03-08 NOTE — PROGRESS NOTES
03/08/23 0900   Pain Assessment   Pain Assessment Tool 0-10   Pain Score 4   Pain Location/Orientation Orientation: Left; Location: Abdomen   Effect of Pain on Daily Activities increased time to complete bed mobility   Patient's Stated Pain Goal No pain   Hospital Pain Intervention(s) Repositioned; Ambulation/increased activity   Cognition   Arousal/Participation Alert; Cooperative   Subjective   Subjective pt agreeable to participate in PT tx  reports right sided pain s/p IR procedure yesterday, didn't sleep well  Roll Left and Right   Type of Assistance Needed Independent; Adaptive equipment   Comment use of bedrail   Roll Left and Right CARE Score 6   Sit to Lying   Type of Assistance Needed Adaptive equipment; Independent   Comment pt able to manage bed controls, intermittent use of bedrails   Sit to Lying CARE Score 6   Lying to Sitting on Side of Bed   Type of Assistance Needed Independent; Adaptive equipment   Comment pt able to manage bed controls, intermittent use of bedrails   Lying to Sitting on Side of Bed CARE Score 6   Sit to Stand   Type of Assistance Needed Independent; Adaptive equipment   Sit to Stand CARE Score 6   Bed-Chair Transfer   Type of Assistance Needed Independent; Adaptive equipment   Comment RW   Chair/Bed-to-Chair Transfer CARE Score 6   Walk 10 Feet   Type of Assistance Needed Independent; Adaptive equipment   Comment RW   Walk 10 Feet CARE Score 6   Walk 50 Feet with Two Turns   Type of Assistance Needed Supervision; Adaptive equipment   Comment RW   Walk 50 Feet with Two Turns CARE Score 4   Walk 150 Feet   Type of Assistance Needed Supervision; Adaptive equipment   Comment RW   Walk 150 Feet CARE Score 4   Ambulation   Distance Walked (feet) 150 ft  (x 1, 100 ft x 2, additional short distanes within room and in therapy gym)   Assist Device Walker   Gait Pattern Inconsistant Josefina;Decreased foot clearance; Improper weight shift   Limitations Noted In Endurance;Speed;Strength   Walk Assist Level   (mod I within room with RW, supervision for increased hallway distances)   Findings no complaints of dizziness/lightheadedness today during gait  gait distances more limtied by reported SOB and abdominal pain  Does the patient walk? 2  Yes   Curb or Single Stair   Style negotiated Single stair   Type of Assistance Needed Incidental touching; Adaptive equipment   Comment placed RW on top of 6 inch  steps  pt negotiated the two 6inch steps with BUE support on RW, ascend forward and descend backward with step to pattern and CGA  performed 2 trials with seated rest break between for energy recovey   1 Step (Curb) CARE Score 4   Therapeutic Interventions   Other practiced managing meal tray, RW, call bell, and other objects in room to assess for IRP  moved [facility] and other chairs around to allow for clear spaces to maneuver with RW   Assessment   Treatment Assessment pt participated in 90 minute PT tx session focusing on practicing in room mobility, ambulating in hallway, and stair negotiation with use of RW per home set up  Pt expressed increased abdominal pain s/p procedure yesterday, requiring increased time to complete all activities  pt did not report any lightheaded/dizziness today and BP stable  reviewed all in room mobility including ambulating to and from bathroom, over to dresser and opening drawers, bed mobility, managing recliner, managing food tray etc  pt did well with use of RW, discussed possible clearance for in room privileges with pt and OT Darrell Chavirai  OT to assess this PM as well  pt will continue to benefit from skilled PT interventions to address deficits, reduce fall risk, and maximize fucntional independence  Problem List Decreased strength;Decreased endurance; Impaired balance;Decreased mobility   Barriers to Discharge Inaccessible home environment   Plan   Treatment/Interventions Functional transfer training;LE strengthening/ROM; Elevations; Therapeutic exercise; Endurance training;Patient/family training;Equipment eval/education; Bed mobility;Gait training   Progress Progressing toward goals   PT Therapy Minutes   PT Time In 0900   PT Time Out 1030   PT Total Time (minutes) 90   PT Mode of treatment - Individual (minutes) 90   PT Mode of treatment - Concurrent (minutes) 0   PT Mode of treatment - Group (minutes) 0   PT Mode of treatment - Co-treat (minutes) 0   PT Mode of Treatment - Total time(minutes) 90 minutes   PT Cumulative Minutes 1030   Therapy Time missed   Time missed?  No

## 2023-03-08 NOTE — CONSULTS
[facility] 61 y o  male MRN: [id number]  Unit/Bed#: -11 Encounter: [id number]    ASSESSMENT and PLAN:  Elevated creatinine on CKD stage III:  · Baseline creatinine 1 2 to 1 4 mg/dL  · Previous ALEXY November 2020, December 2022  · Recently creatinine elevated to 2 5 on 2/7  · Admitted on 2/24 creatinine at baseline  · CT with contrast 2/22: Small right renal cyst   No hydronephrosis  · Low-dose contrast with Alvina tube check 3/7  · Recent creatinine levels in the 1 5-1 6 range  · Severe hypoalbuminemia, increased output from ileostomy  · Creatinine above baseline likely multifactorial with possible volume depletion/volume loss by ileostomy/third spacing/orthostasis/mild hypercalcemia  Contrast delaying recovery  · Recommend placing on IV fluids which can be held while he is having physical therapy  · Plan:  · Start half-normal saline +75 mill equivalents of sodium bicarbonate 75 mL/h which can be held while having physical therapy   · Increase midodrine  · Check labs in the a m  Hyponatremia:  · Sodium 129  Fluctuating between 1   · Likely SIADH: On duloxetine, malignancy  Drinking approximately 2 L of fluid per day  CKD/decreased ability to excrete free water    · Receiving salt tablets 2 g twice a day  · Recently receiving isotonic saline  · May need to reduce fluid intake to 1 5 L/day  · Nutrition drink suggested to increase solute intake-check with dietary regarding    Hypercalcemia:  · Corrected calcium 10 5    Acid-base disorder:  · Bicarbonate 17  · Likely related to GI losses, increased ileostomy output, CKD  · Start bicarbonate containing IV fluids  · Monitor potassium level will likely decline with bicarbonate infusion    Blood pressure/volume status:  · History of hypertension  · Recently having orthostasis which seems to be improving with receiving volume administration overnight  · Norvasc on hold  · Currently on midodrine 2 5 mg twice a day  · Blood pressure acceptable  · Recommend increasing midodrine to 5 mg, 3 times a day if symptoms persist    Metastatic colon cancer:  · Metastatic disease to liver, elevated alkaline phosphatase  · Prior resection/transverse colectomy/ileostomy and chemotherapy  Multiple procedures since November 2022  · Subsequent complications including intra-abdominal abscess and E  coli bacteremia    Cholecystitis  · Recent IR cholecystostomy tube check    HISTORY OF PRESENT ILLNESS:  Requesting Physician: Kimberly Arias DO  Reason for Consult: Elevated creatinine on CKD    Emilee Mccartney is a 61 y o  male with a past medical history of chronic kidney disease, stage III,  Hypertension, diabetes mellitus, obesity, hyperlipidemia, metastatic colon cancer status post partial colectomy and diverting ileostomy (initial surgery November 5618) complicated by intra-abdominal abscess/bacteremia/liver metastasis/cholecystitis and recurrent hospitalizations  He was recently readmitted to [facility] at the beginning of February due to sepsis  He was treated with antibiotics/ertapenem  Imaging was concerning for cholecystitis and drain placed  He was admitted to the [facility] for inpatient rehab on 2/24  Nephrology consulted 3/8 for management of elevated creatinine on CKD  Patient was seen and examined  He was sitting out of bed eating lunch  He reports that he was doing very well on chemotherapy after initial diagnosis then developed significant neuropathy  Surgery was extensive and complicated by infection  He feels that he is getting stronger in rehab now  He was having dizziness upon standing which has improved  He notices that his urine is very dark in color but he feels that he is emptying his bladder completely  He denies lower extremity edema  He did notice increased ileostomy output which is very watery  Baseline creatinine recently has been between 1 2-1 4    Creatinine is currently running near 1 5-1 7 prompting nephrology consult  Additionally sodium level has been low and calcium level elevated  PAST MEDICAL HISTORY:  Past Medical History:   Diagnosis Date   • Abdominal pain 03/12/2022   • Acute renal failure ([place] [id number] )     [id number] resolved   • Acute respiratory failure with hypoxia ([place] [id number] ) 11/12/2022   • Bacteremia 11/12/2022   • Cancer ([place] [address] )    • Diabetes mellitus ([place] [address] )    • Enteritis 08/23/2016   • Flash pulmonary edema ([place] [id number] ) 11/12/2022   • Gastroparesis due to DM ([place] [address] ) 08/23/2016   • GERD (gastroesophageal reflux disease)    • Hernia, ventral 08/04/2016   • Hyperlipidemia    • Hypertension    • Morbid obesity ([place] [address] ) 04/17/2018   • Postoperative visit 03/02/2022   • SIRS (systemic inflammatory response syndrome) ([place] [address] ) 03/12/2022   • Snoring    • Stage 3a chronic kidney disease ([place] [id number] ) 02/19/2022       PAST SURGICAL HISTORY:  Past Surgical History:   Procedure Laterality Date   • COLONOSCOPY     • COLOSTOMY N/A 02/20/2022    Procedure: COLOSTOMY LOOP, diverting;  Surgeon: Deonte Dennis MD;  [facility];  Service: General   • ESOPHAGOGASTRODUODENOSCOPY N/A 08/24/2016    Procedure: ESOPHAGOGASTRODUODENOSCOPY (EGD); Surgeon: Korey Nogueira MD;  Location: [facility];   Service:    • EXPLORATORY LAPAROTOMY W/ BOWEL RESECTION N/A 11/16/2022    Procedure: LAPAROTOMY EXPLORATORY W/ BOWEL RESECTION- VAC PLACEMENT;  Surgeon: Ronny Andrade MD;  Location: [facility];  Service: Surgical Oncology   • EXPLORATORY LAPAROTOMY W/ BOWEL RESECTION N/A 11/17/2022    Procedure: LAPAROTOMY EXPLORATORY W/ BOWEL RESECTION;  Surgeon: Ronny Andrade MD;  [facility]: [facility];  Service: Surgical Oncology   • ILEOSTOMY N/A 11/17/2022    Procedure: Shanon Ovens;  Surgeon: Ronny Andrade MD;  [facility];  Service: Surgical Oncology   • ILEOSTOMY CLOSURE N/A 11/7/2022    Procedure: REVERSAL COLOSTOMY;  Surgeon: Janit Hashimoto, MD;  [facility];  Service: Surgical Oncology   • IR CHOLECYSTOSTOMY TUBE CHECK AND/OR REMOVAL  2/24/2023   • IR CHOLECYSTOSTOMY TUBE CHECK/CHANGE/REPOSITION/REINSERTION/UPSIZE  12/12/2022   • IR CHOLECYSTOSTOMY TUBE PLACEMENT  12/8/2022   • IR DRAINAGE TUBE CHECK AND/OR REMOVAL  1/3/2023   • IR DRAINAGE TUBE CHECK/CHANGE/REPOSITION/REINSERTION/UPSIZE  1/12/2023   • IR DRAINAGE TUBE CHECK/CHANGE/REPOSITION/REINSERTION/UPSIZE  1/31/2023   • IR DRAINAGE TUBE CHECK/CHANGE/REPOSITION/REINSERTION/UPSIZE  2/10/2023   • IR DRAINAGE TUBE CHECK/CHANGE/REPOSITION/REINSERTION/UPSIZE  2/16/2023   • IR DRAINAGE TUBE CHECK/CHANGE/REPOSITION/REINSERTION/UPSIZE  2/24/2023   • IR DRAINAGE TUBE PLACEMENT  12/8/2022   • IR DRAINAGE TUBE PLACEMENT  12/20/2022   • IR PORT CHECK  2/24/2023   • IR PORT PLACEMENT  03/10/2022   • IR THORACENTESIS  1/12/2023   • KIDNEY STONE SURGERY     • LIVER BIOPSY LAPAROSCOPIC N/A 02/20/2022    Procedure: DIAGNOSTIC LAPAROSCOPIC LIVER BIOPSY, DIVERTING LOOP COLOSTOMY ;  Surgeon: Haresh Bruno MD;  [facility];  Service: General   • LIVER LOBECTOMY N/A 11/7/2022    Procedure: SEGMENT 3 LIVER RESECTION, ABLATION, INTRAOPERATIVE U/S OF LIVER, PERITONEAL BIOPSY;  Surgeon: Theodore Navarrete MD;  [facility];  Service: Surgical Oncology   • RIGHT COLON RESECTION N/A 11/7/2022    Procedure: EX LAP, TRANVERSE COLON RESECTION;  Surgeon: Theodore Navarrete MD;  [facility];  Service: Surgical Oncology   • TONSILLECTOMY     • UMBILICAL HERNIA REPAIR LAPAROSCOPIC N/A 04/26/2019    Procedure: LAPAROSCOPIC UMBILICAL HERNIA REPAIR;  Surgeon: Haresh Bruno MD;  [facility];  Service: General   • VAC DRESSING APPLICATION N/A 21/42/7433    Procedure: APPLICATION VAC DRESSING ABDOMEN/TRUNK;  Surgeon: Addison Wagoner MD;  [facility];  Service: Surgical Oncology       ALLERGIES:  Allergies   Allergen Reactions   • Shellfish-Derived Products - Food Allergy Anaphylaxis   • Erythromycin GI Intolerance       SOCIAL HISTORY:  Social History     Substance and Sexual Activity   Alcohol Use Never     Social History     Substance and Sexual Activity   Drug Use No     Social History     Tobacco Use   Smoking Status Never   Smokeless Tobacco Never       FAMILY HISTORY:  Family History   Problem Relation Age of Onset   • Diabetes Mother         mellitus   • Lung cancer Mother    • Coronary artery disease Father        MEDICATIONS:    Current Facility-Administered Medications:   •  acetaminophen (TYLENOL) tablet 650 mg, 650 mg, Oral, Q4H PRN, Anusha Rideau, DO, 650 mg at 03/01/23 2158  •  aspirin chewable tablet 81 mg, 81 mg, Oral, Daily, Anusha Rideau, DO, 81 mg at 03/08/23 8247  •  atorvastatin (LIPITOR) tablet 40 mg, 40 mg, Oral, Daily, Anusha Rideau, DO, 40 mg at 03/08/23 0820  •  calcium carbonate (TUMS) chewable tablet 500 mg, 500 mg, Oral, TID PRN, GAURANG Keith, 500 mg at 03/01/23 1715  •  collagenase (SANTYL) ointment, , Topical, Daily, Anusha Rideau, DO, 1 application   at 03/07/23 1909  •  DULoxetine (CYMBALTA) delayed release capsule 30 mg, 30 mg, Oral, Daily, Anusha Rideau, DO, 30 mg at 03/08/23 4552  •  enoxaparin (LOVENOX) subcutaneous injection 40 mg, 40 mg, Subcutaneous, Q24H SHANNON, RAN KeithNP, 40 mg at 03/08/23 0199  •  hydrOXYzine HCL (ATARAX) tablet 10 mg, 10 mg, Oral, HS, Anusha Rideau, DO, 10 mg at 03/07/23 2227  •  insulin glargine (LANTUS) subcutaneous injection 5 Units 0 05 mL, 5 Units, Subcutaneous, HS, Anusha Rideau, DO, 5 Units at 03/06/23 2147  •  insulin lispro (HumaLOG) 100 units/mL subcutaneous injection 1-6 Units, 1-6 Units, Subcutaneous, TID AC, 1 Units at 03/07/23 1306 **AND** Fingerstick Glucose (POCT), , , 4x Daily AC and at bedtime, Anusha Rideau, DO  •  insulin lispro (HumaLOG) 100 units/mL subcutaneous injection 1-6 Units, 1-6 Units, Subcutaneous, HS, Anusha Robbins DO, 1 Units at 03/06/23 2147  •  insulin lispro (HumaLOG) 100 units/mL subcutaneous injection 5 Units, 5 Units, Subcutaneous, TID With Meals, GAURANG Leon, 5 Units at 03/08/23 6514  •  melatonin tablet 6 mg, 6 mg, Oral, HS PRN, Thensuellen Westminster, DO, 6 mg at 02/28/23 2238  •  methocarbamol (ROBAXIN) tablet 500 mg, 500 mg, Oral, BID PRN, Thena Westminster, DO, 500 mg at 03/03/23 6952  •  midodrine (PROAMATINE) tablet 2 5 mg, 2 5 mg, Oral, BID AC, GAURANG Hurley, 2 5 mg at 03/07/23 1304  •  ondansetron [facility]) injection 4 mg, 4 mg, Intravenous, Q4H PRN, Thensuellen Allener, DO, 4 mg at 03/08/23 0818  •  oxyCODONE (ROXICODONE) IR tablet 2 5 mg, 2 5 mg, Oral, Q4H PRN, Thensuellen Ley DO  •  oxyCODONE (ROXICODONE) IR tablet 5 mg, 5 mg, Oral, Q4H PRN, Moose Ley, DO, 5 mg at 03/08/23 3315  •  pantoprazole (PROTONIX) EC tablet 40 mg, 40 mg, Oral, BID AC, Moose Allener, DO, 40 mg at 03/08/23 0610  •  simethicone (MYLICON) chewable tablet 80 mg, 80 mg, Oral, 4x Daily (with meals and at bedtime), Moose Ley DO, 80 mg at 03/08/23 6521  •  sodium chloride tablet 2 g, 2 g, Oral, BID With Meals, GAURANG Hurley, 2 g at 03/08/23 3282  •  tamsulosin (FLOMAX) capsule 0 4 mg, 0 4 mg, Oral, Daily With Dinner, Moose Ley DO, 0 4 mg at 03/07/23 1639    REVIEW OF SYSTEMS:  Constitutional: Positive for weakness  Good appetite  HENT: Negative for postnasal drip  Eyes: Negative for visual disturbance  Respiratory: Negative for cough, shortness of breath and wheezing  Cardiovascular: Negative for chest pain, palpitations and leg swelling  Gastrointestinal: Positive for increased output from ileostomy  No nausea or vomiting  Genitourinary: No dysuria, hematuria  Urine dark  Endocrine: Negative for polyuria  Musculoskeletal: Negative for arthralgias  Skin: Negative for rash  Neurological: Positive for neuropathy  Dizziness upon standing at times  Hematological: Negative for  bleeding  Psychiatric/Behavioral: Negative for confusion   All the systems were reviewed and were negative except as documented on the HPI      PHYSICAL EXAM:  Current Weight: Weight - Scale: 80 1 kg (176 lb 9 4 oz)  First Weight: Weight - Scale: 81 5 kg (179 lb 9 6 oz)  Vitals:    03/07/23 1510 03/07/23 1626 03/07/23 2042 03/08/23 0559   BP:  129/82 134/76 133/79   BP Location:  Right arm Left arm Left arm   Pulse: 94 92 104 88   Resp: 17 18 20 17   Temp:  98 °F (36 7 °C) 99 2 °F (37 3 °C) 98 1 °F (36 7 °C)   TempSrc:  Oral Oral Oral   SpO2: 98% 98% 95% 95%   Weight:       Height:           Intake/Output Summary (Last 24 hours) at 3/8/2023 1105  Last data filed at 3/8/2023 0601  Gross per 24 hour   Intake 10 ml   Output 725 ml   Net -715 ml     Physical Exam  Constitutional:       General: He is not in acute distress  Appearance: He is well-developed  He is ill-appearing  He is not diaphoretic  HENT:      Head: Normocephalic and atraumatic  Nose: Nose normal  No congestion  Mouth/Throat:      Mouth: Mucous membranes are moist       Pharynx: No oropharyngeal exudate  Eyes:      General: No scleral icterus  Right eye: No discharge  Left eye: No discharge  Extraocular Movements: Extraocular movements intact  Conjunctiva/sclera: Conjunctivae normal    Neck:      Thyroid: No thyromegaly  Vascular: No carotid bruit or JVD  Trachea: No tracheal deviation  Cardiovascular:      Rate and Rhythm: Normal rate and regular rhythm  Heart sounds: No murmur heard  No friction rub  No gallop  Pulmonary:      Effort: Pulmonary effort is normal       Breath sounds: Normal breath sounds  Abdominal:      General: Bowel sounds are normal  There is no distension  Tenderness: There is abdominal tenderness  Comments: Ileostomy in place with liquid stool   Musculoskeletal:         General: No swelling, tenderness or signs of injury  Normal range of motion  Cervical back: Normal range of motion and neck supple  No rigidity or tenderness  Right lower leg: No edema  Left lower leg: No edema  Skin:     General: Skin is warm and dry  Coloration: Skin is jaundiced and pale  Findings: No erythema or rash  Neurological:      Mental Status: He is alert and oriented to person, place, and time  Psychiatric:         Mood and Affect: Mood normal          Behavior: Behavior normal          Thought Content:  Thought content normal          Judgment: Judgment normal            Invasive Devices:      Lab Results:   Results from last 7 days   Lab Units 03/08/23  0615 03/07/23  0549 03/06/23  0952 03/06/23  0633   WBC Thousand/uL  --  17 07* 15 85* 15 91*   HEMOGLOBIN g/dL  --  7 6* 7 7* 7 5*   HEMATOCRIT %  --  24 5* 24 8* 24 4*   PLATELETS Thousands/uL  --  351 338 352   POTASSIUM mmol/L 4 0 4 0 4 1 3 8   CHLORIDE mmol/L 105 105 102 102   CO2 mmol/L 17* 17* 18* 18*   BUN mg/dL 22 24 26* 26*   CREATININE mg/dL 1 54* 1 58* 1 66* 1 57*   CALCIUM mg/dL 8 1* 8 4 8 1* 8 1*   ALK PHOS U/L 982* 966* 934* 965*   ALT U/L 14 14 12 13   AST U/L 90* 86* 79* 82*     Other Studies:

## 2023-03-09 LAB
ALBUMIN SERPL BCP-MCNC: 1.1 G/DL (ref 3.5–5)
ALP SERPL-CCNC: 972 U/L (ref 46–116)
ALT SERPL W P-5'-P-CCNC: 16 U/L (ref 12–78)
ANION GAP SERPL CALCULATED.3IONS-SCNC: 8 MMOL/L (ref 4–13)
AST SERPL W P-5'-P-CCNC: 87 U/L (ref 5–45)
BASOPHILS # BLD AUTO: 0.08 THOUSANDS/ÂΜL (ref 0–0.1)
BASOPHILS NFR BLD AUTO: 1 % (ref 0–1)
BILIRUB SERPL-MCNC: 2.22 MG/DL (ref 0.2–1)
BUN SERPL-MCNC: 19 MG/DL (ref 5–25)
C DIFF TOX GENS STL QL NAA+PROBE: NEGATIVE
CALCIUM ALBUM COR SERPL-MCNC: 10.6 MG/DL (ref 8.3–10.1)
CALCIUM SERPL-MCNC: 8.3 MG/DL (ref 8.3–10.1)
CHLORIDE SERPL-SCNC: 105 MMOL/L (ref 96–108)
CO2 SERPL-SCNC: 18 MMOL/L (ref 21–32)
CREAT SERPL-MCNC: 1.36 MG/DL (ref 0.6–1.3)
EOSINOPHIL # BLD AUTO: 0 THOUSAND/ÂΜL (ref 0–0.61)
EOSINOPHIL NFR BLD AUTO: 0 % (ref 0–6)
ERYTHROCYTE [DISTWIDTH] IN BLOOD BY AUTOMATED COUNT: 17.2 % (ref 11.6–15.1)
GFR SERPL CREATININE-BSD FRML MDRD: 56 ML/MIN/1.73SQ M
GLUCOSE P FAST SERPL-MCNC: 146 MG/DL (ref 65–99)
GLUCOSE SERPL-MCNC: 108 MG/DL (ref 65–140)
GLUCOSE SERPL-MCNC: 129 MG/DL (ref 65–140)
GLUCOSE SERPL-MCNC: 132 MG/DL (ref 65–140)
GLUCOSE SERPL-MCNC: 141 MG/DL (ref 65–140)
GLUCOSE SERPL-MCNC: 146 MG/DL (ref 65–140)
HCT VFR BLD AUTO: 23.6 % (ref 36.5–49.3)
HGB BLD-MCNC: 7.4 G/DL (ref 12–17)
IMM GRANULOCYTES # BLD AUTO: 0.31 THOUSAND/UL (ref 0–0.2)
IMM GRANULOCYTES NFR BLD AUTO: 2 % (ref 0–2)
LYMPHOCYTES # BLD AUTO: 1.02 THOUSANDS/ÂΜL (ref 0.6–4.47)
LYMPHOCYTES NFR BLD AUTO: 6 % (ref 14–44)
MCH RBC QN AUTO: 26.2 PG (ref 26.8–34.3)
MCHC RBC AUTO-ENTMCNC: 31.4 G/DL (ref 31.4–37.4)
MCV RBC AUTO: 84 FL (ref 82–98)
MONOCYTES # BLD AUTO: 1.46 THOUSAND/ÂΜL (ref 0.17–1.22)
MONOCYTES NFR BLD AUTO: 9 % (ref 4–12)
NEUTROPHILS # BLD AUTO: 13.45 THOUSANDS/ÂΜL (ref 1.85–7.62)
NEUTS SEG NFR BLD AUTO: 82 % (ref 43–75)
NRBC BLD AUTO-RTO: 0 /100 WBCS
PLATELET # BLD AUTO: 357 THOUSANDS/UL (ref 149–390)
PMV BLD AUTO: 10.2 FL (ref 8.9–12.7)
POTASSIUM SERPL-SCNC: 4.1 MMOL/L (ref 3.5–5.3)
PROT SERPL-MCNC: 6.3 G/DL (ref 6.4–8.4)
RBC # BLD AUTO: 2.82 MILLION/UL (ref 3.88–5.62)
SODIUM SERPL-SCNC: 131 MMOL/L (ref 135–147)
WBC # BLD AUTO: 16.32 THOUSAND/UL (ref 4.31–10.16)

## 2023-03-09 RX ORDER — SODIUM BICARBONATE 650 MG/1
1300 TABLET ORAL 2 TIMES DAILY
Status: DISCONTINUED | OUTPATIENT
Start: 2023-03-09 | End: 2023-03-10

## 2023-03-09 RX ADMIN — SODIUM BICARBONATE 650 MG TABLET 1300 MG: at 21:24

## 2023-03-09 RX ADMIN — HYDROXYZINE HYDROCHLORIDE 10 MG: 10 TABLET ORAL at 21:24

## 2023-03-09 RX ADMIN — SIMETHICONE 80 MG: 80 TABLET, CHEWABLE ORAL at 07:51

## 2023-03-09 RX ADMIN — ATORVASTATIN CALCIUM 40 MG: 40 TABLET, FILM COATED ORAL at 08:06

## 2023-03-09 RX ADMIN — COLLAGENASE SANTYL: 250 OINTMENT TOPICAL at 14:18

## 2023-03-09 RX ADMIN — INSULIN LISPRO 5 UNITS: 100 INJECTION, SOLUTION INTRAVENOUS; SUBCUTANEOUS at 12:04

## 2023-03-09 RX ADMIN — DULOXETINE HYDROCHLORIDE 30 MG: 30 CAPSULE, DELAYED RELEASE ORAL at 08:06

## 2023-03-09 RX ADMIN — OXYCODONE HYDROCHLORIDE 5 MG: 5 TABLET ORAL at 07:59

## 2023-03-09 RX ADMIN — SIMETHICONE 80 MG: 80 TABLET, CHEWABLE ORAL at 12:03

## 2023-03-09 RX ADMIN — ONDANSETRON 4 MG: 2 INJECTION INTRAMUSCULAR; INTRAVENOUS at 07:57

## 2023-03-09 RX ADMIN — SODIUM BICARBONATE: 84 INJECTION, SOLUTION INTRAVENOUS at 06:48

## 2023-03-09 RX ADMIN — INSULIN LISPRO 5 UNITS: 100 INJECTION, SOLUTION INTRAVENOUS; SUBCUTANEOUS at 16:32

## 2023-03-09 RX ADMIN — ASPIRIN 81 MG CHEWABLE TABLET 81 MG: 81 TABLET CHEWABLE at 08:06

## 2023-03-09 RX ADMIN — MIDODRINE HYDROCHLORIDE 5 MG: 5 TABLET ORAL at 12:03

## 2023-03-09 RX ADMIN — INSULIN LISPRO 5 UNITS: 100 INJECTION, SOLUTION INTRAVENOUS; SUBCUTANEOUS at 07:51

## 2023-03-09 RX ADMIN — SODIUM CHLORIDE 2 G: 1 TABLET ORAL at 08:36

## 2023-03-09 RX ADMIN — SIMETHICONE 80 MG: 80 TABLET, CHEWABLE ORAL at 16:25

## 2023-03-09 RX ADMIN — PANTOPRAZOLE SODIUM 40 MG: 40 TABLET, DELAYED RELEASE ORAL at 16:25

## 2023-03-09 RX ADMIN — SIMETHICONE 80 MG: 80 TABLET, CHEWABLE ORAL at 21:24

## 2023-03-09 RX ADMIN — MIDODRINE HYDROCHLORIDE 5 MG: 5 TABLET ORAL at 16:25

## 2023-03-09 RX ADMIN — INSULIN GLARGINE 5 UNITS: 100 INJECTION, SOLUTION SUBCUTANEOUS at 21:23

## 2023-03-09 RX ADMIN — ENOXAPARIN SODIUM 40 MG: 40 INJECTION SUBCUTANEOUS at 08:06

## 2023-03-09 RX ADMIN — PANTOPRAZOLE SODIUM 40 MG: 40 TABLET, DELAYED RELEASE ORAL at 06:32

## 2023-03-09 RX ADMIN — OXYCODONE HYDROCHLORIDE 5 MG: 5 TABLET ORAL at 12:03

## 2023-03-09 RX ADMIN — TAMSULOSIN HYDROCHLORIDE 0.4 MG: 0.4 CAPSULE ORAL at 16:25

## 2023-03-09 NOTE — PROGRESS NOTES
BG 78  Snack given  RN reached out to Lux Navarro to make aware  Verbal order obtained to hold Lantus  Will continue to monitor

## 2023-03-09 NOTE — PROGRESS NOTES
OT Daily Treatment Note       03/09/23 0245   Pain Assessment   Pain Assessment Tool 0-10   Pain Score 4   Pain Location/Orientation Location: Abdomen   Restrictions/Precautions   Precautions Contact/isolation;Multiple lines;Pain   Lifestyle   Autonomy "I don't know if I'm going home tomorrow  Depends if I am medically stable or not"   Oral Hygiene   Type of Assistance Needed Independent   Physical Assistance Level No physical assistance   Comment in stance at sink   Oral Hygiene CARE Score 6   Shower/Bathe Self   Type of Assistance Needed Independent   Physical Assistance Level No physical assistance   Comment seated sponge bath at EOB   Shower/Bathe Self CARE Score 6   Bathing   Assessed Bath Style Sponge Bath   Anticipated D/C Bath Style Sponge Bath   Positioning Seated;Standing   Tub/Shower Transfer   Reason Not Assessed Sponge Bath   Upper Body Dressing   Type of Assistance Needed Independent   Physical Assistance Level No physical assistance   Comment seated at EOB   Upper Body Dressing CARE Score 6   Lower Body Dressing   Type of Assistance Needed Independent   Physical Assistance Level No physical assistance   Comment seated EOB  inc time   Lower Body Dressing CARE Score 6   Putting On/Taking Off Footwear   Type of Assistance Needed Physical assistance   Physical Assistance Level 51%-75%   Comment TA for TEDs   pt able to don/doff standard  socks   Putting On/Taking Off Footwear CARE Score 2   Sit to Lying   Type of Assistance Needed Independent   Physical Assistance Level No physical assistance   Sit to Lying CARE Score 6   Lying to Sitting on Side of Bed   Type of Assistance Needed Independent   Physical Assistance Level No physical assistance   Lying to Sitting on Side of Bed CARE Score 6   Sit to Stand   Type of Assistance Needed Independent   Physical Assistance Level No physical assistance   Comment RW   Sit to Stand CARE Score 6   Bed-Chair Transfer   Type of Assistance Needed Independent Physical Assistance Level No physical assistance   Comment RW   Chair/Bed-to-Chair Transfer CARE Score 6   Toileting Hygiene   Type of Assistance Needed Independent   Physical Assistance Level No physical assistance   Comment standard toilet   Toileting Hygiene CARE Score 6   Toilet Transfer   Type of Assistance Needed Independent   Physical Assistance Level No physical assistance   Comment with RW on standard toilet   Toilet Transfer CARE Score 6   Toilet Transfer   Surface Assessed Standard Toilet   Cognition   Overall Cognitive Status [facility]   Arousal/Participation Alert; Cooperative   Attention Within functional limits   Orientation Level Oriented X4   Memory Within functional limits   Following Commands Follows all commands and directions without difficulty   Activity Tolerance   Activity Tolerance Patient tolerated treatment well  (frequent rest breaks req for fatigue mgmt)   Assessment   Treatment Assessment Pt participated in skilled OT session with focus on ADL retraining, functional transfer training, compensatory technique education and energy conservation  See flowsheet for details of session and current functional status  Pt is limited by decreased endurance and decreased strength and requires skilled OT services to increase independence and safety with ADL completion in prep for DC home  Pt was schedueled to DC home tomorrow but that is TBD pending medical clearance  Plan to continue ADL retraining and endurance training to address barriers mentioned above     Prognosis Good   Problem List Decreased endurance   Barriers to Discharge None   Plan   Treatment/Interventions Endurance training;ADL retraining   OT Therapy Minutes   OT Time In 0830   OT Time Out 0930   OT Total Time (minutes) 60   OT Mode of treatment - Individual (minutes) 60   OT Mode of treatment - Concurrent (minutes) 0   OT Mode of treatment - Group (minutes) 0   OT Mode of treatment - Co-treat (minutes) 0   OT Mode of Treatment - Total time(minutes) 60 minutes   OT Cumulative Minutes 840   Therapy Time missed   Time missed?  No

## 2023-03-09 NOTE — PROGRESS NOTES
PM&R PROGRESS NOTE:  Romy Weathers 61 y o  male MRN: [id number]  Unit/Bed#: -77 Encounter: [id number]        Rehabilitation Diagnosis: Impairment of mobility, safety and Activities of Daily Living (ADLs) due to Debility:  16  Debility (Non-cardiac/Non-pulmonary)    HPI: Moose Rehman is a 61 y o  male with a history of hyperlipidemia, hypertension, GERD, ventral hernia, colon adenocarcinoma that initially presented to [facility] for an elective surgical procedure with Dr Joselyn Leiva on 11/7/22  Patient presented to hem/onc on  9/22 when CT abdomen revealed transverse colonic apple core lesion and innumerable hepatic metastases  MRI revealed multiple hepati metastasis with smaller lesions in left lobe and larger lesions on the right lobe  On 11/7, patient underwent exploratory laparotomy, segment 3 liver resection, ablation, intraoperative ultrasound of the liver  This was complicated by left upper quadrant hematoma, imaging showed suspected leak from transverse colon anastomosis  On 11/16, patient underwent exploratory lap revealing hematoma, defect in transverse colon anastomosis with extravasation of succus and a dilated cecum requiring resection  He had a right hemicolectomy, left in discontinuity and temporary abdominal closure device  On 11/17, patient underwent re-exploratory, partial descending colectomy, primary colonic anastomosis created with diverting loop ileostomy and midline wound vac placement  An intra-abdominal abscess was positive for ESBL and patient completed prolonged course of antibiotics  He was discharged to SNF on 12/3 and returned to hospital on 12/4 for increased abdominal pain  Patient monitored off of antibiotics  On 12/6 patient was noted with an increased creatinine level, received IVF bolus with improvement  Patient developed tachycardia, increased abdominal pain, and incision with yellow drainage noted  CT AP showed abdominal abscess and distended gallbladder   ID was consulted, patient was continued on IV antibiotics through 12/15  Patient went to IR for percutaneous cholecystostomy tube insertion and hepatectomy abscess drainage  Nephrology consulted and initiated sodium bicarb gtt and IV albumin  On 12/14, patient midline/left chest wall pain, below abscess drain  Cardiac work-up completed  Troponin level 57 and CXR showed progressive bilateral opacities suspicious for CHF and small left pleural effusion  Cariology consulted with chest pain appearing musculoskeletal no further work up warranted  Patient was deemed medically stable and admitted to [address] [place] [address] on 12/14/22  He was discharged from [facility] on 01/17/23  Patient present to [facility] on 02/07 with increased weakness for two weeks with 2 falls noted  No injury noted  He was found to be septic with the main source of midline abdominal wound which was with foul smelling drainage  CT showed improvement in fluid collection at the left liver segment, perisplenic, left retroperitoneum, interior to the spleen  Patient was treated with broad spectrum antibiotics and eventually placed on Ertapenem  Hospital course complicated by fevers and leukocytosis  Repeat imaging concerning for cholecystitis  Patient underwent cystostomy tube exchange and Pattock drain was removed on 02/24  The patient was evaluated by the Rehabilitation team and deemed an appropriate candidate for an inpatient acute rehabilitation program  Patient was admitted on 02/24/23  SUBJECTIVE: Patient seen face to face  No acute issues overnight  Reports abdominal pain right lower quadrant that refers to left upper quadrant with deep inhalation  The former colostomy site skin is healed, small soft outpouching at site, non-tender to palpation  Midline abdominal incision visualized, healing, pink, small areas of slough at edges proximal and distal  Insertion site for cholecystomy tube with scant tan drainage, reddened area around tube   Ileostomy with soft unformed stool  Denies chest pain, shortness of breath, nausea, fever, chills  24 hour total  Cholecystostomy drain- capped  Ileostomy-  575 cc    ASSESSMENT: Stable, progressing    PLAN:  - Cdiff negative, stool soft unformed consistency will hold off on psyllium and monitor   - WBC 16 32, Hbg 7 4, Na 131, creatinine 1 36, Alk phos 972 TB 2 22, AST 87, ALT 16, K 4 1  - GI, awaiting MRCP tonight  - nephrology, sodium bicarb gtt complete 1L then transition to PO sodium bicarb 1300 mg BID, d/c sodium tabs, continue Midodrine 5 mg TID   - RLQ pain; continue to monitor, oxycodone prn  - Leukocytosis: ID following    Rehabilitation  • Functional deficits:  Mobility, self care  • Continue current rehabilitation plan of care to maximize function  • Functional update:   · PT: mobility, transfers, and ambulation- independent with RW  · OT: ADL- incidental touching for bathing, lower body dressing- min A  • Estimated Discharge: pending medical stability, planned 03/10 with home PT    Pain  • Tylenol, oxycodone    DVT prophylaxis  • Lovenox    Bladder plan  • Continent    Bowel plan  • Ileostomy    Malignant neoplasm of transverse colon [facility])  Assessment & Plan  Complex history with chronological details below:    2/22: S/P diverting colostomy, liver biopsy +Chemo tx  11/7/22: Hepatic resection and colostomy reversal  11/16/22: Exploratory laparotomy with bowel resection and VAC placement   11/17/22: Right hemicolectomy, partial descending colon resection, colocolonic anastomosis with loop ileosotmy and midline VAC placement  12/20/22: Drains placed, total of 3 drains + perc radha tube + ileostomy  1/31/23: 2 of 3 drains removed  This hospital stay had fever/leukocytosis/sepsis, started Meropenem and then to Vancomycin and Ertapenem  Wound cx again = Ecoli ESBL  LD Ertapenem 2/14, Vancomycin 2/16 2/24: Abscessogram of midline drain showed minimal persistent collection and the midline catheter was removed   Perc Radha tube exchanged  03/06- pending IR cholecystostomy tube check    • Patient does not wish to follow with [facility] anymore as it is too far of a drive; would like to establish with [place] [facility] (already follows with Dr Sarahy Hernandez)  • Follows with Dr Melida Gomez as outpt      * Sepsis [facility])  Assessment & Plan  Noted the development of foul-smelling drainage and increasing pain from his chronic abdominal wound with leukocytosis, tachypnea, tachycardia  • Purulent drainage from abdominal wound  • Hx of colon CA w/ extensive abd surgical- adenocarcinoma of transverse colon section  • Patient has several abdominal drains in place, with CT showing improvement in the fluid collections at the left liver lobe, perisplenic area, and the left retroperitoneum inferior to the spleen  • Completed Vancomycin and Ertapenem  • S/p bedside midline wound debridement + wet dressing 2/8 with general surgery  • S/p IR tube check 2/17/2023, drains kept in place  Discussed with IR, follow up in 1 month  • Given ongoing low grade fever and worsening leukocytosis, ordered repeat blood cultures and repeat CT a/p    CT a/p showing moderate R pleural effusion, imaging concerning for cholecystitis  Discussed case with surgery, IR  IR will reposition drain today- Alvina tube check showed retraction of the alvina tube   Interval occlusion of the cystic duct   Dino-enteric fistula persisted  Exchange of alvina tube 2/24  • Continue to monitor off of antibiotics, low threshold to re-consult ID, but stable at this time  • Debility from Sepsis in addition to 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  • Pain control, anxiety management  • PT/OT  • 03/06 WBC 15 85, Hbg 7 7, Na+ 128, creatinine 1 66, total bilirubin, AST/ALT/Alk Phos elevated    • Consulted GI, deferred to IR for tube check  • IVF NS 1L  - Cholecystostomy drain check findings: diagnostic cholangiogram demonstrating filling of cystic and common bile duct, minimal pain, no leaking, slight adjustment of tube further into gallbladder body  Tube capped, can consider removal if patient passes capping trial in 2 weeks and if surgery is in agreement  - c/s Surgical Oncology, Dr Brody Haddad in agreement with IR recommendations  - GI consulted, recommending MRI/MRCP r/o obstruction 03/09, trend LFT  -nephrology consulted- sodium bicarb 1300 mg BID, d/c sodium tabs, midodrine 5 mg      Chronic bilateral pleural effusions  Assessment & Plan  Hx of pleural effusions with   Interval worsening of moderate right and small left pleural effusions  There is associated bibasilar atelectasis based on CTAP on 2/22  Monitor oxygen and breathing, may benefit from thoracentesis    Depression  Assessment & Plan  Continue Cymbalta  Provide supportive counseling and encouragement, easily tearful  Neuropsychology consultation for support    Cholecystitis, unspecified  Assessment & Plan  Subacute at this point, but still requiring per alvina tube  • s/p percutaneous tube placement 12/8/22 asnd recent exchange on 2/24/23  • CT scan was concerning for cholecystitis --> to IR 2/24 = showed retraction of the alvina tube and interval occlusion of the cystic duct   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  Alvina tube was exchanged  • Monitor output and pain  • 3/7 cholecystostomy drain check- advanced slightly and capped; consider removal in 2 wks if tolerates capping  • Dr Brody Haddad in agreement    Abdominal wound dehiscence  Assessment & Plan  Midline abdominal wound with initially foul smelling drainage  Now improved per patient with red granular base and slough noted  Santyl and wet to dry dressing was done in acute care  Consult wound care   Daily dressing changes    Nephrolithiasis  Assessment & Plan  RUQ US revealed 7 mm nonobstructing right intrarenal calculus  No hydronephrosis noted on CT a/p  Asymptomatic at this time     • Flomax  • Monitor outpt, symptoms    Hyponatremia  Assessment & Plan  • Mild, most recently 129 (previous 131)   • Felt likely2/2 SIADH in setting of malignancy  • Sodium chloride tab 1 BID  • Monitor BMP    Elevated alkaline phosphatase level  Assessment & Plan  Chronically elevated likely in setting of known hepatic metastasis  • RUQ US Decompressed gallbladder around a cholecystostomy tube, limiting evaluation  Hepatomegaly  Heterogeneous echotexture of the liver in this patient with known metastatic disease  7 mm nonobstructing right intrarenal calculus  • Monitor with am CMP  • GI consulted; MRI/MRCP to r/o obstruction    Leukocytosis  Assessment & Plan  WBC 17 07 (previous 13 23, 13 07, 13 41)  Mildly above normal, trendiong down, however has a history of spiking and dropping  Monitor labs however need to follow clinical exam and vitals as it may spike and drop again  Monitor off antibiotics  ID following  If Temp >101 blood cultures and consider CT A/P  R/o C diff 3/8    Abscess  Assessment & Plan  Smaller abscesses on recent studies now with no active drains  At risk for further complications  Lower theshold for abdominal CT if develops fevers or leukocytosis    Acute on chronic kidney failure [facility])  Assessment & Plan  Lab Results   Component Value Date    EGFR 56 02/24/2023    EGFR 52 02/23/2023    EGFR 52 02/22/2023    CREATININE 1 36 (H) 02/24/2023    CREATININE 1 45 (H) 02/23/2023    CREATININE 1 45 (H) 02/22/2023     • POA with creatinine 2 52; baseline 1-1 3  • Suspecting possible multifactorial cause in setting of dehydration and sepsis  • Improving back into baseline levels, Continue to monitor on labs, minimize any relative hypotension  Avoid nephrotoxic agents    • Creatinine 1 66, IV fluids, recheck BMP tomorrow    Anemia  Assessment & Plan  Hbg 7 6 (previous 7 4, 9 5, 8 3)  Required transfusion on 2/16  Order type and screen, 1 unit pRBC (2/26)  Monitor CBC    Mixed hyperlipidemia  Assessment & Plan  Continue statin    Benign essential hypertension  Assessment & Plan  Monitor pressures in therapy    Type 2 diabetes mellitus without complication, with long-term current use of insulin [facility])  Assessment & Plan  Lab Results   Component Value Date    HGBA1C 8 0 (H) 09/26/2022       Recent Labs     02/24/23  0839 02/24/23  1137 02/24/23  1626 02/24/23 2040   POCGLU 106 105 152* 120     • Home:  Lantus 8U qam/Lispro 4U TID  • Here:  Lantus 5U qhs/Lispro 4U TID  • Has a DEXCOM meter and a regular meter at home  • Continue DM diet and QID Accuchecks/SSI    Appreciate IM consultants medical co-management  Personally reviewed labs, medications, and imaging  ROS:  Review of Systems   A 10 point review of systems was negative except for what is noted in the HPI  OBJECTIVE:   /72 (BP Location: Right arm)   Pulse 90   Temp 97 9 °F (36 6 °C) (Oral)   Resp 18   Ht 5' 9" (1 753 m)   Wt 80 1 kg (176 lb 9 4 oz)   SpO2 97%   BMI 26 08 kg/m²     Physical Exam  Constitutional:       Appearance: Normal appearance  HENT:      Head: Normocephalic and atraumatic  Mouth/Throat:      Mouth: Mucous membranes are moist    Cardiovascular:      Rate and Rhythm: Normal rate and regular rhythm  Pulses: Normal pulses  Pulmonary:      Effort: Pulmonary effort is normal       Breath sounds: Normal breath sounds  Abdominal:      General: Bowel sounds are normal       Palpations: Abdomen is soft  Comments: + ileostomy, cholecystostomy drain (capped)   Musculoskeletal:         General: Tenderness present  Normal range of motion  Cervical back: Normal range of motion  Skin:     General: Skin is warm and dry  Capillary Refill: Capillary refill takes less than 2 seconds  Coloration: Skin is jaundiced  Comments: Midline abdominal wound, healing   Neurological:      Mental Status: He is alert and oriented to person, place, and time     Psychiatric:         Mood and Affect: Mood normal          Judgment: Judgment normal         Lab Results   Component Value Date    WBC 16 32 (H) 03/09/2023    HGB 7 4 (L) 03/09/2023    HCT 23 6 (L) 03/09/2023    MCV 84 03/09/2023     03/09/2023     Lab Results   Component Value Date    SODIUM 131 (L) 03/09/2023    K 4 1 03/09/2023     03/09/2023    CO2 18 (L) 03/09/2023    BUN 19 03/09/2023    CREATININE 1 36 (H) 03/09/2023    GLUC 146 (H) 03/09/2023    CALCIUM 8 3 03/09/2023     Lab Results   Component Value Date    INR 1 32 (H) 02/07/2023    INR 1 12 12/19/2022    INR 1 10 11/12/2022    PROTIME 16 1 (H) 02/07/2023    PROTIME 14 7 (H) 12/19/2022    PROTIME 14 4 11/12/2022       Current Facility-Administered Medications:   •  acetaminophen (TYLENOL) tablet 650 mg, 650 mg, Oral, Q4H PRN, Chrissie Medici, DO, 650 mg at 03/01/23 2158  •  aspirin chewable tablet 81 mg, 81 mg, Oral, Daily, Chrissie Medici, DO, 81 mg at 03/09/23 0806  •  atorvastatin (LIPITOR) tablet 40 mg, 40 mg, Oral, Daily, Chrissie Medici, DO, 40 mg at 03/09/23 0806  •  calcium carbonate (TUMS) chewable tablet 500 mg, 500 mg, Oral, TID PRN, GAURANG Keith, 500 mg at 03/01/23 1715  •  collagenase (SANTYL) ointment, , Topical, Daily, Chrissie Medici, DO, Given at 03/09/23 1418  •  DULoxetine (CYMBALTA) delayed release capsule 30 mg, 30 mg, Oral, Daily, Chrissie Medici, DO, 30 mg at 03/09/23 0806  •  enoxaparin (LOVENOX) subcutaneous injection 40 mg, 40 mg, Subcutaneous, Q24H SHANNON, GAURANG Keith, 40 mg at 03/09/23 0806  •  hydrOXYzine HCL (ATARAX) tablet 10 mg, 10 mg, Oral, HS, Chrissie Medici, DO, 10 mg at 03/08/23 2143  •  insulin glargine (LANTUS) subcutaneous injection 5 Units 0 05 mL, 5 Units, Subcutaneous, HS, Chrissie Medicmarky , 5 Units at 03/06/23 2147  •  insulin lispro (HumaLOG) 100 units/mL subcutaneous injection 1-6 Units, 1-6 Units, Subcutaneous, TID AC, 1 Units at 03/08/23 1124 **AND** Fingerstick Glucose (POCT), , , 4x Daily AC and at bedtime, Chrissie MedicDO marky  •  insulin lispro (HumaLOG) 100 units/mL subcutaneous injection 1-6 Units, 1-6 Units, Subcutaneous, HS, Carlota Castro, DO, 1 Units at 03/06/23 2147  •  insulin lispro (HumaLOG) 100 units/mL subcutaneous injection 5 Units, 5 Units, Subcutaneous, TID With Meals, GAURANG Barroso, 5 Units at 03/09/23 1204  •  melatonin tablet 6 mg, 6 mg, Oral, HS PRN, Carlota Mountain, DO, 6 mg at 02/28/23 2238  •  methocarbamol (ROBAXIN) tablet 500 mg, 500 mg, Oral, BID PRN, [facility], 500 mg at 03/03/23 6456  •  midodrine (PROAMATINE) tablet 5 mg, 5 mg, Oral, TID AC, GAURANG Evans, 5 mg at 03/09/23 1203  •  ondansetron [facility]) injection 4 mg, 4 mg, Intravenous, Q4H PRN, Carlota Mountain, DO, 4 mg at 03/09/23 0757  •  oxyCODONE (ROXICODONE) IR tablet 2 5 mg, 2 5 mg, Oral, Q4H PRN, [facility]  •  oxyCODONE (ROXICODONE) IR tablet 5 mg, 5 mg, Oral, Q4H PRN, Carlota Mountain DO, 5 mg at 03/09/23 1203  •  pantoprazole (PROTONIX) EC tablet 40 mg, 40 mg, Oral, BID AC, Carlota Harlan DO, 40 mg at 03/09/23 6835  •  simethicone (MYLICON) chewable tablet 80 mg, 80 mg, Oral, 4x Daily (with meals and at bedtime), Carlota Harlan DO, 80 mg at 03/09/23 1203  •  sodium bicarbonate tablet 1,300 mg, 1,300 mg, Oral, BID, Ethan Hill MD  •  tamsulosin [facility]) capsule 0 4 mg, 0 4 mg, Oral, Daily With [facility], DO, 0 4 mg at 03/08/23 1700    Past Medical History:   Diagnosis Date   • Abdominal pain 03/12/2022   • Acute renal failure ([facility] [address] )     [id number] resolved   • Acute respiratory failure with hypoxia ([facility] [address] ) 11/12/2022   • Bacteremia 11/12/2022   • Cancer ([place] [address] )    • Diabetes mellitus ([place] [address] )    • Enteritis 08/23/2016   • Flash pulmonary edema ([place] [address] ) 11/12/2022   • Gastroparesis due to DM ([place] [address] ) 08/23/2016   • GERD (gastroesophageal reflux disease)    • Hernia, ventral 08/04/2016   • Hyperlipidemia    • Hypertension    • Morbid obesity ([place] [address] ) 04/17/2018   • Postoperative visit 03/02/2022   • SIRS (systemic inflammatory response syndrome) ([place] [address] ) 03/12/2022   • Snoring    • Stage 3a chronic kidney disease ([place] [address] ) 02/19/2022       Patient Active Problem List    Diagnosis Date Noted   • Malignant neoplasm of transverse colon ([place] [address] ) 03/01/2022   • Sepsis ([place] [address] ) 12/17/2022   • 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 liver ([place] [address] ) 03/02/2022   • Abdominal wound dehiscence 02/24/2023   • Cholecystitis, unspecified 02/24/2023   • Depression 02/24/2023   • Chronic bilateral pleural effusions 02/24/2023   • Nephrolithiasis 02/09/2023   • Abnormal CT scan 02/07/2023   • Hyponatremia 02/07/2023   • Dehiscence of incision 12/30/2022   • Elevated alkaline phosphatase level 12/30/2022   • Leukocytosis 12/29/2022   • Abscess 12/27/2022   • Acute pain 12/27/2022   • Severe protein-calorie malnutrition ([place] [address] ) 12/14/2022   • Acute on chronic kidney failure ([place] [address] ) 12/14/2022   • MR (mitral regurgitation) 11/12/2022   • ESBL (extended spectrum beta-lactamase) producing bacteria infection 11/12/2022   • Encephalopathy 11/09/2022   • Cervical radiculopathy 10/26/2022   • Other fatigue 06/15/2022   • Colostomy prolapse ([place] [address] ) 05/27/2022   • Colon cancer metastasized to liver ([place] [address] ) 03/12/2022   • Iron deficiency anemia, unspecified 03/01/2022   • Thrombocytosis 02/21/2022   • Hypokalemia 02/19/2022   • Transaminitis 02/19/2022   • Type 2 diabetes mellitus with hyperlipidemia ([place] [address] ) 02/18/2022   • Anemia 02/18/2022   • Left ureteral calculus 01/30/2020   • Incarcerated umbilical hernia 97/10/6985   • Testicular hypogonadism 06/19/2017   • Low testosterone 05/30/2017   • Type 2 diabetes mellitus without complication, with long-term current use of insulin ([place] [address] ) 08/23/2016   • Benign essential hypertension 08/23/2016   • Mixed hyperlipidemia 08/23/2016   • Erectile dysfunction 07/11/2016   • Obesity (BMI 30-39 9) 07/11/2016        [place] Later, GAURANG  Physical Medicine and Chris Luu

## 2023-03-09 NOTE — PROGRESS NOTES
NEUROPSYCHOLOGY  CLINICAL PROGRESS NOTE   Dahiana Knutson 61 y o  :1965 male MRN: [id number]  DOS:22   Unit/Bed#: -01 Encounter: [id number]      Requested by (Physician/Service): Ida To DO      HISTORY: Dahiana Knutson is a 61 y o  male with a medical history of hypertension, hyperlipidemia, ventral hernia, GERD that presented to [address] [place] [address] on  for an elective surgical procedure due to metastatic colon adenocarcinoma by Dr Netta Mckenzie  Patient presented to hem/onc on  when CT of abdomen revealed transverse colonic apple core lesion and innumerable hepatic metastases  MRI revealed multiple hepatic metastasis with smaller lesions in left lobe with larger lesions on the right lobe  On , patient underwent exploratory laparotomy, segment 3 liver resection, ablation, intraoperative ultrasound of liver  This was complicated by a left upper quadrant hematoma, imaging showed suspected leak from transverse colon anastomosis  On  went to OR revealed left upper quadrant infected hematoma, defect in the transverse colon anastomosis with extravasation of succus  Massively dilated cecum requiring resection  He had a right hemicolectomy, left in discontinuity and temporary abdominal closure device was placed  On , he underwent re-exploration, partial descending colectomy, primary colonic anastomosis created with diverting loop ileostomy and midline wound VAC placement  He completed a prolonged course of IV antibiotics for ESBL bacteremia due to intra-abdominal abscess  On 12/3, patient was discharged to SNF for rehab  Patient was transferred back to hospital on  due to abdominal pain  He is unable to return to SNF due to concerns for pain management and management of acute medical needs  Per Surgery, patient is to continue with wet-to-dry dressings to midline abdominal incision, and packing to old stoma site   Patient has been observed off of antibiotics, with close monitoring of leukocytosis  On 12/6, patient was with noted increased creatinine level, and was administered 1L IV fluid bolus, with noted improvement  He then developed tachycardia, as well as increased abdominal pain, requiring additional IV fluid bolus  Abdominal incision was with noted yellow drainage  CT chest/abdomen/pelvis showed abdominal abscess and distended gallbladder  ID was consulted, and patient was continued on IV antibiotics (plan for 7 day course through 12/15)  Patient was taken to IR and underwent percutaneous cholecystostomy tube insertion and hepatectomy abscess drainage  Nephrology was consulted re: AELXY, and initiated patient on Sodium Bicarb gtt as well as IV Albumin to assist with intravascular volume shifts, which as since been discontinued  On 12/14, patient with complaints of midline/left chestwall pain, right below IR abscess drain  Lasted for a few seconds and then resolved - suspected related to drain placement, however cardiac workup completed  Trop level was 57 and CXR showed progressive bilateral opacities suspicious for CHF and small left pleural effusion  Cardiology was consulted, with chest pain appearing musculoskeletal, and no further cardiac workup is warranted  Additionally, no diuresis or further intervention suggested for CXR findings  The patient was evaluated by the Rehabilitation team and deemed an appropriate candidate for comprehensive inpatient rehabilitation and admitted to the [facility] on 12/14/2022  He was discharged home on 1/17/23      He presented to the [address] [place] [address] on 2/7/23 with increasing weakness for the prior 2 weeks in the setting of 2 falls  He was found to be Spetic on admission with the m ain potential source being his midline abdominal wound with fould-smelling drainage   CT showed improvement in the fluid collection at the left liver segment, perisplenic, left retroperitoneum, inferior to the spleen  Patient treated with broad spectrum antibiotics eventually placed on Ertapenem  Course conplicated by spiking intermittent fevers as well as elevated WBC count   Repeat imaging concerning for cholecystitis  This was discussed with both surgery and IR, IR plan for cystostomy tube change   Pattock drain was removed on 2/24, cystostomy drain exchanged on 2/24  The patient was evaluated by the Rehabilitation team and deemed an appropriate candidate for comprehensive inpatient rehabilitation and admitted to the [facility] on 2/24/2023  2:58 PM   Functional deficits: impaired mobility, self care  Rehabilitation goals are to achieve a Modified Independent level with mobility and self care  Prognosis is good  ELOS is 10-14 days  Estimated discharge is home       On arrival he endorsed some discomfort around the perc chol site as it was just exchanged, otherwise he endorses improved appetite and limited nausea since his prior admission, less leaking of the ostomy and overall improved function, but still not at his baseline   Hgb remains low, type and screen and CBC ordered for the morning          Past Medical History:   Diagnosis Date   • Abdominal pain 03/12/2022   • Acute renal failure ([facility] )     [id number] resolved   • Acute respiratory failure with hypoxia ([facility] ) 11/12/2022   • Bacteremia 11/12/2022   • Cancer ([facility] )    • Diabetes mellitus ([facility] )    • Enteritis 08/23/2016   • Flash pulmonary edema ([address] ) 11/12/2022   • Gastroparesis due to DM ([address] ) 08/23/2016   • GERD (gastroesophageal reflux disease)    • Hernia, ventral 08/04/2016   • Hyperlipidemia    • Hypertension    • Morbid obesity ([facility] ) 04/17/2018   • Postoperative visit 03/02/2022   • SIRS (systemic inflammatory response syndrome) ([facility] ) 03/12/2022   • Snoring    • Stage 3a chronic kidney disease ([address] ) 02/19/2022       Patient Active Problem List    Diagnosis Date Noted   • Abdominal wound dehiscence 02/24/2023   • Cholecystitis, unspecified 02/24/2023   • Depression 02/24/2023   • Chronic bilateral pleural effusions 02/24/2023   • Nephrolithiasis 02/09/2023   • Abnormal CT scan 02/07/2023   • Hyponatremia 02/07/2023   • Dehiscence of incision 12/30/2022   • Elevated alkaline phosphatase level 12/30/2022   • Leukocytosis 12/29/2022   • Abscess 12/27/2022   • Acute pain 12/27/2022   • Sepsis ([facility] ) 12/17/2022   • Severe protein-calorie malnutrition ([facility] ) 12/14/2022   • Acute on chronic kidney failure ([facility] ) 12/14/2022   • MR (mitral regurgitation) 11/12/2022   • ESBL (extended spectrum beta-lactamase) producing bacteria infection 11/12/2022   • Encephalopathy 11/09/2022   • Cervical radiculopathy 10/26/2022   • Other fatigue 06/15/2022   • Colostomy prolapse ([facility] ) 05/27/2022   • Colon cancer metastasized to liver ([place] [address] ) 03/12/2022   • 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 liver ([facility] ) 03/02/2022   • Iron deficiency anemia, unspecified 03/01/2022   • Malignant neoplasm of transverse colon ([facility] ) 03/01/2022   • Thrombocytosis 02/21/2022   • Hypokalemia 02/19/2022   • Transaminitis 02/19/2022   • Type 2 diabetes mellitus with hyperlipidemia ([facility] ) 02/18/2022   • Anemia 02/18/2022   • Left ureteral calculus 01/30/2020   • Incarcerated umbilical hernia 46/84/6987   • Testicular hypogonadism 06/19/2017   • Low testosterone 05/30/2017   • Type 2 diabetes mellitus without complication, with long-term current use of insulin ([facility] ) 08/23/2016   • Benign essential hypertension 08/23/2016   • Mixed hyperlipidemia 08/23/2016   • Erectile dysfunction 07/11/2016   • Obesity (BMI 30-39 9) 07/11/2016       Body mass index is 26 08 kg/m²      Past Medical History:     Past Medical History:   Diagnosis Date   • Abdominal pain 03/12/2022   • Acute renal failure ([place] [address] )     [id number] resolved   • Acute respiratory failure with hypoxia ([place] [address] ) 11/12/2022   • Bacteremia 11/12/2022   • Cancer ([place] [address] )    • Diabetes mellitus ([place] [address] )    • Enteritis 08/23/2016   • Flash pulmonary edema ([place] [address] ) 11/12/2022   • Gastroparesis due to DM ([place] [address] ) 08/23/2016   • GERD (gastroesophageal reflux disease)    • Hernia, ventral 08/04/2016   • Hyperlipidemia    • Hypertension    • Morbid obesity ([place] [address] ) 04/17/2018   • Postoperative visit 03/02/2022   • SIRS (systemic inflammatory response syndrome) ([place] [address] ) 03/12/2022   • Snoring    • Stage 3a chronic kidney disease ([place] [address] ) 02/19/2022        Past Surgical History:     Past Surgical History:   Procedure Laterality Date   • COLONOSCOPY     • COLOSTOMY N/A 02/20/2022    Procedure: COLOSTOMY LOOP, diverting;  Surgeon: Lisa Franco MD;  Location: [facility];  Service: General   • ESOPHAGOGASTRODUODENOSCOPY N/A 08/24/2016    Procedure: ESOPHAGOGASTRODUODENOSCOPY (EGD); Surgeon: Carrillo Newton MD;  Location: [facility];   Service:    • EXPLORATORY LAPAROTOMY W/ BOWEL RESECTION N/A 11/16/2022    Procedure: LAPAROTOMY EXPLORATORY W/ BOWEL RESECTION- VAC PLACEMENT;  Surgeon: Morenita Kim MD;  Location: [facility];  Service: Surgical Oncology   • EXPLORATORY LAPAROTOMY W/ BOWEL RESECTION N/A 11/17/2022    Procedure: LAPAROTOMY EXPLORATORY W/ BOWEL RESECTION;  Surgeon: Morenita iKm MD;  [facility];  Service: Surgical Oncology   • ILEOSTOMY N/A 11/17/2022    Procedure: ILEOSTOMY;  Surgeon: Morenita Kim MD;  [facility];  Service: Surgical Oncology   • ILEOSTOMY CLOSURE N/A 11/7/2022    Procedure: REVERSAL COLOSTOMY;  Surgeon: Sergio Sams MD;  [facility];  Service: Surgical Oncology   • IR CHOLECYSTOSTOMY TUBE CHECK AND/OR REMOVAL  2/24/2023   • IR CHOLECYSTOSTOMY TUBE CHECK/CHANGE/REPOSITION/REINSERTION/UPSIZE  12/12/2022   • IR CHOLECYSTOSTOMY TUBE PLACEMENT  12/8/2022   • IR DRAINAGE TUBE CHECK AND/OR REMOVAL  1/3/2023   • IR DRAINAGE TUBE CHECK/CHANGE/REPOSITION/REINSERTION/UPSIZE  1/12/2023   • IR DRAINAGE TUBE CHECK/CHANGE/REPOSITION/REINSERTION/UPSIZE  1/31/2023   • IR DRAINAGE TUBE CHECK/CHANGE/REPOSITION/REINSERTION/UPSIZE  2/10/2023   • IR DRAINAGE TUBE CHECK/CHANGE/REPOSITION/REINSERTION/UPSIZE  2/16/2023   • IR DRAINAGE TUBE CHECK/CHANGE/REPOSITION/REINSERTION/UPSIZE  2/24/2023   • IR DRAINAGE TUBE PLACEMENT  12/8/2022   • IR DRAINAGE TUBE PLACEMENT  12/20/2022   • IR PORT CHECK  2/24/2023   • IR PORT PLACEMENT  03/10/2022   • IR THORACENTESIS  1/12/2023   • KIDNEY STONE SURGERY     • LIVER BIOPSY LAPAROSCOPIC N/A 02/20/2022    Procedure: DIAGNOSTIC LAPAROSCOPIC LIVER BIOPSY, DIVERTING LOOP COLOSTOMY ;  Surgeon: Willem Salomon MD;  [facility]: [facility];  Service: General   • LIVER LOBECTOMY N/A 11/7/2022    Procedure: SEGMENT 3 LIVER RESECTION, ABLATION, INTRAOPERATIVE U/S OF LIVER, PERITONEAL BIOPSY;  Surgeon: Lukasz Marina MD;  [facility]: [facility];  Service: Surgical Oncology   • RIGHT COLON RESECTION N/A 11/7/2022    Procedure: EX LAP, TRANVERSE COLON RESECTION;  Surgeon: Lukasz Marina MD;  [facility];  Service: Surgical Oncology   • TONSILLECTOMY     • UMBILICAL HERNIA REPAIR LAPAROSCOPIC N/A 04/26/2019    Procedure: LAPAROSCOPIC UMBILICAL HERNIA REPAIR;  Surgeon: Willem Salomon MD;  [facility]: [facility];  Service: General   • VAC DRESSING APPLICATION N/A 36/70/2036    Procedure: APPLICATION VAC DRESSING ABDOMEN/TRUNK;  Surgeon: Mel Dailey MD;  [facility];  Service: Surgical Oncology         Allergies:      Allergies   Allergen Reactions   • Shellfish-Derived Products - Food Allergy Anaphylaxis   • Erythromycin GI Intolerance         Family and Social Support:   No data recorded    Social History:    Social History     Socioeconomic History   • Marital status: /Civil Union     Spouse name: Not on file   • Number of children: Not on file   • Years of education: Not on file   • Highest education level: Not on file   Occupational History   • Occupation: ACTOR     Employer: [employer]   Tobacco Use   • Smoking status: Never   • Smokeless tobacco: Never   Vaping Use   • Vaping Use: Never used   Substance and Sexual Activity   • Alcohol use: Never   • Drug use: No   • Sexual activity: Yes     Partners: Female   Other Topics Concern   • Not on file   Social History Narrative   • Not on file     Social Determinants of Health     Financial Resource Strain: Not on file   Food Insecurity: No Food Insecurity   • Worried About Running Out of Food in the Last Year: Never true   • Ran Out of Food in the Last Year: Never true   Transportation Needs: No Transportation Needs   • Lack of Transportation (Medical): No   • Lack of Transportation (Non-Medical):  No   Physical Activity: Insufficiently Active   • Days of Exercise per Week: 5 days   • Minutes of Exercise per Session: 10 min   Stress: No Stress Concern Present   • Feeling of Stress : Not at all   Social Connections: Not on file   Intimate Partner Violence: Not on file   Housing Stability: Low Risk    • Unable to Pay for Housing in the Last Year: No   • Number of Places Lived in the Last Year: 1   • Unstable Housing in the Last Year: No        Family History:    Family History   Problem Relation Age of Onset   • Diabetes Mother         mellitus   • Lung cancer Mother    • Coronary artery disease Father        Medications:     Current Facility-Administered Medications:   •  acetaminophen (TYLENOL) tablet 650 mg, 650 mg, Oral, Q4H PRN, Celinemena Anger, DO, 650 mg at 03/01/23 2158  •  aspirin chewable tablet 81 mg, 81 mg, Oral, Daily, Wilhelmena Anger, DO, 81 mg at 03/08/23 2530  •  atorvastatin (LIPITOR) tablet 40 mg, 40 mg, Oral, Daily, Wilhelmena Anger, DO, 40 mg at 03/08/23 0820  •  calcium carbonate (TUMS) chewable tablet 500 mg, 500 mg, Oral, TID PRN, GAURANG Keith, 500 mg at 03/01/23 1715  •  collagenase (SANTYL) ointment, , Topical, Daily, Rosie Anger, DO, Given at 03/08/23 1113  •  DULoxetine (CYMBALTA) delayed release capsule 30 mg, 30 mg, Oral, Daily, Wilhelmena Anger, DO, 30 mg at 03/08/23 8633  •  enoxaparin (LOVENOX) subcutaneous injection 40 mg, 40 mg, Subcutaneous, Q24H [address], GAURANG Keith, 40 mg at 03/08/23 9175  •  hydrOXYzine HCL (ATARAX) tablet 10 mg, 10 mg, Oral, HS, Anusha Rideau, DO, 10 mg at 03/07/23 2227  •  insulin glargine (LANTUS) subcutaneous injection 5 Units 0 05 mL, 5 Units, Subcutaneous, HS, Anusha Rideau, DO, 5 Units at 03/06/23 2147  •  insulin lispro (HumaLOG) 100 units/mL subcutaneous injection 1-6 Units, 1-6 Units, Subcutaneous, TID AC, 1 Units at 03/08/23 1124 **AND** Fingerstick Glucose (POCT), , , 4x Daily AC and at bedtime, Anusha Rideau, DO  •  insulin lispro (HumaLOG) 100 units/mL subcutaneous injection 1-6 Units, 1-6 Units, Subcutaneous, HS, Anusha Rideau, DO, 1 Units at 03/06/23 2147  •  insulin lispro (HumaLOG) 100 units/mL subcutaneous injection 5 Units, 5 Units, Subcutaneous, TID With Meals, GAURANG Leon, 5 Units at 03/08/23 1701  •  melatonin tablet 6 mg, 6 mg, Oral, HS PRN, Anusha Rideau, DO, 6 mg at 02/28/23 2238  •  methocarbamol (ROBAXIN) tablet 500 mg, 500 mg, Oral, BID PRN, Anusha Rideau, DO, 500 mg at 03/03/23 0889  •  midodrine (PROAMATINE) tablet 5 mg, 5 mg, Oral, TID AC, GAURANG Evans, 5 mg at 03/08/23 1700  •  ondansetron (ZOFRAN) injection 4 mg, 4 mg, Intravenous, Q4H PRN, Anusha Rideau, DO, 4 mg at 03/08/23 0818  •  oxyCODONE (ROXICODONE) IR tablet 2 5 mg, 2 5 mg, Oral, Q4H PRN, Anusha Rideau, DO  •  oxyCODONE (ROXICODONE) IR tablet 5 mg, 5 mg, Oral, Q4H PRN, Anusha Rideau, DO, 5 mg at 03/08/23 7955  •  pantoprazole (PROTONIX) EC tablet 40 mg, 40 mg, Oral, BID AC, Anusha Rideau, DO, 40 mg at 03/08/23 1700  •  simethicone (MYLICON) chewable tablet 80 mg, 80 mg, Oral, 4x Daily (with meals and at bedtime), Anusha Robbins, DO, 80 mg at 03/08/23 1700  •  sodium bicarbonate 75 mEq in sodium chloride 0 45 % 1,000 mL infusion, , Intravenous, Continuous, GAURANG Evans, Last Rate: 75 mL/hr at 03/08/23 1700, New Bag at 03/08/23 1700  •  sodium chloride tablet 2 g, 2 g, Oral, BID With Meals, Porsha Harden Obdulia Dang, 2 g at 03/08/23 1701  •  tamsulosin (FLOMAX) capsule 0 4 mg, 0 4 mg, Oral, Daily With Nicole Siddiqi DO, 0 4 mg at 03/08/23 1700        OBSERVATIONS:     Appearance  _x___Neat ____Disheveled ____Overweight ____Underweight ____Frail    Speech  ___x_Fluid, Articulate ____Spontaneous ____Circumlocutions ____Word Finding difficulties ____Dysarthria ____Circumstantial ____Paucity ____Perseverative ____Pressured ____ Tangential     Thought Process/Content  _x___Coherent  ____Preoccupations____Ruminations____Obsessions____Hallucinations ____Delusions ____Flight of Ideas ____Distortions ____Distracted ____Suicidal Ideation ____Homicidal Ideation ____ Memory Issues ____ Perseveration ____ Tangential    Interaction  __x__Engaged__x__Cooperative____Superficial____Detached____Fearful____Guarded____Suspicious ____Poor Elspeth Goltz ____Aggressive    Affect  ____Neutral____Positive____Anxiety____Worried____Irritable____Angry__x__Depressed/Dysphoric ____Euthymic _____ Tiera Yvon __x__ Fatigued     Behavior  __x_Spontaneous __x__Purposeful ____Slowed Initiation ____Apathetic ____Impulsive ____Dyscontrolled ____Hypoactive ____Hyperactive ____Repetitive/Perseverative         Overall Progress:    ____Very Declined ____Declined __x__Stable ____Improved ____Very Improved    Motivation and Participation:    ____Very Poor ____Poor ____Fair __x__Good ____Very Good                    ASSESSMENT & RECOMMENDATIONS:   Pt is making progress in psychotherapy and  reports feeling  motivated to continue working towards rehab goals  He feels encouraged by the medical recent procedures which he feels are working  He feels he is making progress in therapy  However, he is having difficulty falling and remaining asleep  Once he wakes up he has trouble falling back to sleep  Provided guided meditation with music designed to help with sleep  Pt was able to download and began to utilize    Will check back to see which work and make modifications as needed  Provided CBT and mindfulness based interventions, as well as supportive psychotherapy  Addressed coping, adjustment, and motivation  I will continue to evaluate pt's emotional functioning and status and provide supportive and mindfulness interventions during pt's stay  Effective coping strategies, distress tolerance, breathing exercises will be key interventions  Time spent face to face:  25 minutes  Continued Psychological intervention is medically necessary to:  __x__ Maintain current progress  __X_   Achieve Therapy Goals   __X__Prevent relapse       DIAGNOSIS:  Adjustment Disorder with Anxiety and Depression  Major Depression, Single episode, severe, without psychotic features, resolved    Thank you for the opportunity to participate in Mr Sommer's care  Ledy Esquivel, Ph D   Licensed Psychologist

## 2023-03-09 NOTE — PROGRESS NOTES
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  • s/p diverting loop colostomy/liver biopsy with subsequent chemotherapy 2/2022  • s/p hepatic resection and reversal of colostomy on 11/7/22  • s/p ex-lap with bowel resection/VAC placement 11/16/22  • s/p right hemicolectomy with partial descending colectomy, colocolonic anastomosis with loop ileostomy and mid line abdominal VAC placement 11/17  • s/p additional drains placed for a perisplenic abscess and splenic abscess 12/20/22 (then had 3 drains plus the already existing perc radha tube)  • The 2 left lateral drains were removed by IR 1/31/23 as an OP  • Had previously been tx'd with Ertapenem  • This hospital stay had fever/leukocytosis/sepsis, started Meropenem and then to Vancomycin and Ertapenem  • Wound cx again = Ecoli ESBL  • LD Ertapenem 2/14, Vancomycin 2/16  • To IR 2/24 = Abscessogram of midline drain showed minimal persistent collection and the midline catheter was removed  • Recent wound debridement by general surgery prior to transfer = continue Santyl qd to wound  • Has chr elevated Alk phos 700's to 800s, 900s again today and stable     Acute cholecystitis  • s/p percutaneous tube placement 12/8/22  • CT scan was concerning for cholecystitis --> to IR 2/24 = showed retraction of the radha tube and interval occlusion of the cystic duct   +Cholecysto enteric fistula   Radha tube was exchanged   Surgery saw 2/27/23 = stable  • Drainage from perc radha tube milky light green (20ml on 3/4/23 and nothing recorded from 3/5 or 3/6 although he says they did dump some from it on 3/5)  • Perc radha tube check 3/7/23 --> slight adjustment of tube further into GB body and tube is capped = may be removed if pt passes capping trial in 2 weeks and if surgery is in agreement --> Jen[facility] from [address] TT'd Dr Gurjit Cannon 3/7/23 and he was OK with that plan  • TB rising and is up to 2 22 today --> GI seeing and he is for MRCP this evening      PORT catheter  • On 2/24/23 in IR = port check showed probable small thrombus at tip of cathter, flushed/improved aspiration of the port     HTN/orthostasis/tachycardia  • D/c'd Norvasc 2 5mg qd since BPs were too low to get  • Did have some symptomatic orthostasis on 5/35/54 98 systolic and 8/28/89 with 87/57 standing (102/71 sitting)  • Continue TEDS  • On 3/1/23 AM SBP standing was 87 with HR up to 122 w/o sx and then with PT SBP 98 with sx  • Started Midodrine 2 5mg BID on 3/2/23 (give at 0600, 1100) --> held this AM SBP was 130     • HRs are now in 80s to 90s after hydration    • TSH on 3/3/23 = 1 35     Increased ileostomy drainage  · Had not been an issue prior  · Output 3/8 was 1325ml  · cdiff negative 3/8/23    Diabetes mellitus  • Home:  Lantus 8U qam/Lispro 4U TID  • Here:  Lantus 5U qhs/Lispro 5U TID  • Has a DEXCOM meter and a regular meter at home  • Continue DM diet, QID Accuchecks/SSI  • Had to hold Lispro with dinner evening 3/7/23, held Lantus last night since BS was 78    • No changes today     CKD III  • Baseline 1 2-1 4  • Creat was 2 5 on admission with a K+ level of 5 9  • Creat jumped to 1 66 on 3/6/23 and 3/7/23 gave NSS x 1 liter  • Renal following and gave more IVF noting that may be from increased ileostomy output     Metabolic acidosis  · Was on 1/2 NS with sodium bicarb 75 meq started 3/8 = to stop after current bag that is hanging    Anemia  • Transfused in December and January  • Hemoglobin was 7 1 on 2/26 = transfused x 1  • Hemoglobin had dropped to 7 7 on 3/6/23 and was 7 6 on 3/7/23  • He has had no bleeding  • stable at 7 4     Situational depression  • Cymbalta      Left pleural effusion  • Thoracentesis 1/12 for 300ml  • Cyto from pleural fluid was negative for malignancy; cx = NG  • CT 2/22/23 = "Interval worsening of moderate right and small left pleural effusions"  • Has had no SOB  • Will watch     Hyponatremia  • Felt likely 2/2 SIADH in setting of malignancy  • On 2/27/23, added NACL 1 GM qd  • On 3/1/23, increased NACL tabs to BID, inc to 2Gm BID 3/3/23  • NACL tabs are now stopped per renal and changed to sodium bicarbonate tabs to tx metabolic acidosis     Malnutrition  • Had Magic cups BID ordered but didn't like them so stopped  • Prefers premier protein banana flavored shake --> wife brought in   One bottle has 30 Gm protein and only 4 carbs     Low grade temp/leukocytosis  • Was 100 3 on evening of 2/25 = afebrile since then  • WBC was up from 15 to 17 now back to 15  • ID following, watching  • Will get CBC in AM 3/10/23        Discharge date:  Team    The above assessment and plan was reviewed and updated as determined by my evaluation of the patient on 3/9/2023      Labs:   Results from last 7 days   Lab Units 03/09/23  0647 03/07/23  0549   WBC Thousand/uL 16 32* 17 07*   HEMOGLOBIN g/dL 7 4* 7 6*   HEMATOCRIT % 23 6* 24 5*   PLATELETS Thousands/uL 357 351     Results from last 7 days   Lab Units 03/09/23  0647 03/08/23  0615   SODIUM mmol/L 131* 129*   POTASSIUM mmol/L 4 1 4 0   CHLORIDE mmol/L 105 105   CO2 mmol/L 18* 17*   BUN mg/dL 19 22   CREATININE mg/dL 1 36* 1 54*   CALCIUM mg/dL 8 3 8 1*             Results from last 7 days   Lab Units 03/09/23  1042 03/09/23  0631 03/08/23  2108   POC GLUCOSE mg/dl 141* 132 78       Review of Scheduled Meds:  Current Facility-Administered Medications   Medication Dose Route Frequency Provider Last Rate   • acetaminophen  650 mg Oral Q4H PRN Andrew Alvarez, DO     • aspirin  81 mg Oral Daily Andrew Kim, DO     • atorvastatin  40 mg Oral Daily Andrew Kim, DO     • calcium carbonate  500 mg Oral TID PRN GAURANG Medina     • collagenase   Topical Daily Andrew Kim, DO     • DULoxetine  30 mg Oral Daily Adrianne Copa, DO     • enoxaparin  40 mg Subcutaneous Q24H [address] GAURANG Keith     • hydrOXYzine HCL  10 mg Oral HS Adrianne Copa, DO     • insulin glargine  5 Units Subcutaneous HS Adrianne Copa, DO     • insulin lispro  1-6 Units Subcutaneous TID AC Adrianne Copa, DO     • insulin lispro  1-6 Units Subcutaneous HS Adrianne Copa, DO     • insulin lispro  5 Units Subcutaneous TID With Meals Michaelyn Spatz, CRNP     • melatonin  6 mg Oral HS PRN Adrianne Copa, DO     • methocarbamol  500 mg Oral BID PRN Adrianne Copa, DO     • midodrine  5 mg Oral TID AC GAURANG Evans     • ondansetron  4 mg Intravenous Q4H PRN Adrianne Copa, DO     • oxyCODONE  2 5 mg Oral Q4H PRN Adrianne Copa, DO     • oxyCODONE  5 mg Oral Q4H PRN Adrianne Copa, DO     • pantoprazole  40 mg Oral BID AC Adrianne Copa, DO     • simethicone  80 mg Oral 4x Daily (with meals and at bedtime) Adrianne Copa, DO     • sodium bicarbonate  1,300 mg Oral BID Evi Pandya MD     • tamsulosin  0 4 mg Oral Daily With [address] [place] [address],          Subjective/ HPI: Patient seen and examined  Patients overnight issues or events were reviewed with nursing or staff during rounds or morning huddle session  New or overnight issues include the following:     No new or overnight issues  Offers no complaints    ROS:   A 10 point ROS was performed; negative except as noted above         Imaging:     IR cholecystostomy tube check/change/reposition/reinsertion/upsize    (Results Pending)   MRI abdomen wo contrast and mrcp    (Results Pending)       *Labs /Radiology studies reviewed  *Medications reviewed and reconciled as needed  *Please refer to order section for additional ordered labs studies  *Case discussed with primary attending during morning huddle case rounds    Physical Examination:  Vitals:   Vitals:    03/08/23 1128 03/08/23 1358 03/08/23 2111 03/09/23 0627   BP: 123/72 130/68 137/81 135/78   BP Location: Right arm Left arm Left arm Right arm   Pulse:  80 90 95   Resp:  16 18 18   Temp:  98 3 °F (36 8 °C) 99 1 °F (37 3 °C) 98 3 °F (36 8 °C)   TempSrc:  Oral Oral Oral   SpO2:  96% 97% 95%   Weight:       Height:           General Appearance: no distress, conversive  HEENT:  External ear normal   Nose normal w/o drainage  Mucous membranes are moist  Oropharynx is clear  Conjunctiva clear w/o icterus or redness  Neck:  Supple, normal ROM  Lungs: BBS without crackles/wheeze/rhonchi; respirations unlabored with normal inspiratory/expiratory effort  No retractions noted  On RA  CV: regular rate and rhythm; no rubs/murmurs/gallops, PMI normal   ABD: Abdomen is soft  Bowel sounds all quadrants  Nontender with no distention  Perc radha tube is capped  EXT: no edema  Skin: normal turgor, normal texture, no rashes  Psych: affect normal, mood normal  Neuro: AAO      The above physical exam was reviewed and updated as determined by my evaluation of the patient on 3/9/2023  Invasive Devices     Central Venous Catheter Line  Duration           Port A Cath 03/10/22 Right Chest 364 days          Drain  Duration           Ileostomy  days    Cholecystostomy Tube 13 days                   VTE Pharmacologic Prophylaxis: Enoxaparin  Code Status: Level 1 - Full Code  Current Length of Stay: 13 day(s)      Total time spent:  30 minutes with more than 50% spent counseling/coordinating care  Counseling includes discussion with patient re: progress  and discussion with patient of his/her current medical state/information  Coordination of patient's care was performed in conjunction with primary service  Time invested included review of patient's labs, vitals, and management of their comorbidities with continued monitoring  In addition, this patient was discussed with medical team including physician and advanced extenders   The care of the patient was extensively discussed and appropriate treatment plan was formulated unique for this patient  Medical decision making for the day was made by supervising physician unless otherwise noted in their attestation statement  ** Please Note:  voice to text software may have been used in the creation of this document   Although proof errors in transcription or interpretation are a potential of such software**

## 2023-03-09 NOTE — CASE MANAGEMENT
Received fax confirmation of contd stay from [facility] at [facility], [address] 3/9 with tentative dc 3/10  Cm learned this morning pt is for an ERCP and dc is most likely not occurring tomorrow  Cm awaiting medical notes to send to [facility] for cotd stay review depending on plan  Per therapy pt has met therapy goals

## 2023-03-09 NOTE — PROGRESS NOTES
03/09/23 1230   Pain Assessment   Pain Assessment Tool 0-10   Pain Score 5   Pain Location/Orientation Location: Abdomen   Restrictions/Precautions   Precautions Fall Risk;Contact/isolation;Multiple lines;Pain  (ostomy)   Weight Bearing Restrictions No   ROM Restrictions No   Braces or Orthoses   (b/l knee high TEDs, removed end of session per pt request)   Subjective   Subjective Pt reporting fatigue but agreeable to session  + orthostatic hypotension with symptoms, seated TE and nu step performed to help improve BPs   Sit to Lying   Type of Assistance Needed Independent   Sit to Lying CARE Score 6   Sit to Stand   Type of Assistance Needed Independent   Comment RW   Sit to Stand CARE Score 6   Bed-Chair Transfer   Type of Assistance Needed Independent   Comment RW   Chair/Bed-to-Chair Transfer CARE Score 6   Transfer Bed/Chair/Wheelchair   Findings despite c/o fatigue and orthostatic BPs, pt able to identify when it is safe to transfer/mobilize pending symptoms   Car Transfer   Type of Assistance Needed Physical assistance   Physical Assistance Level 25% or less   Comment with RW and cushion on seat, requires boost from low car   Anticipate if pt performed from his SUV would not require A to exit car   Car Transfer CARE Score 3   Walk 10 Feet   Type of Assistance Needed Independent   Comment RW   Walk 10 Feet CARE Score 6   Walk 50 Feet with Two Turns   Type of Assistance Needed Set-up / clean-up   Comment RW   Walk 50 Feet with Two Turns CARE Score 5   Walk 150 Feet   Comment fatigued at 76' this session   Reason if not Attempted Safety concerns   Walk 150 Feet CARE Score 88   Walking 10 Feet on Uneven Surfaces   Type of Assistance Needed Incidental touching   Comment RW over floor mat   Walking 10 Feet on Uneven Surfaces CARE Score 4   Ambulation   Primary Mode of Locomotion Prior to Admission Walk   Distance Walked (feet) 75 ft  (x1 SPC; 60'x1, 30'x1, 15'x2 RW)   Assist Device    Gait Pattern Inconsistant Josefina; Slow Josefina;Decreased foot clearance; Forward Flexion   Limitations Noted In Balance; Endurance;Speed;Strength   Walk Assist Level Contact Guard;Close Supervision;Modified Independent   Findings performed ambulation with SPC with CG/CS and with use of RW 10' distances pt Gregory  Does the patient walk? 2  Yes   Curb or Single Stair   Style negotiated Single stair   Type of Assistance Needed Incidental touching   Comment CGA with RW placed on top of steps; forward ascent, retro descent, needed cueing for proper sequencing   1 Step (Curb) CARE Score 4   4 Steps   Reason if not Attempted Safety concerns   4 Steps CARE Score 88   12 Steps   Reason if not Attempted Safety concerns   12 Steps CARE Score 88   Stairs   Type Stairs   # of Steps 2   Weight Bearing Precautions Fall Risk   Assist Devices Roller Walker   Picking Up Object   Type of Assistance Needed Independent   Comment Gregory with reacher to retrieve marker from floor   Picking Up Object CARE Score 6   Therapeutic Interventions   Strengthening seated LAQs, hip flex, APs post BP being taken and noted pt with orthostatic hypotension  TEDS donned already   Flexibility seated b/l LE passive ROM and stretching 10' total   Equipment Use   NuStep lvl 1, 8 5 mins, LEs only to improve BP   Assessment   Treatment Assessment Pt engaged in 90 min skilled PT session focusing on performing functional mobilities as well as monitoring BPs as pt with + orthostatic hypotension and symptomatic as well as improve overall strength and endurance  Pt reporting fatigue start of session stating he felt as though he has not been able to progress as fast as he has done so in past  Despite this pt still performing transfers and in room ambulation independently with use of RW  Pt able to identify when he felt BP was dropping and initiate interventions to improve BP (i e  seated TE)  Pt with good body awareness and still safe despite ortho BPs   BP did improve t/o session with activity and symptoms disappated  Cont to recommend RW for d/c home as well as w/c pending pts fatigue level and educated pt to use SPC only when spouse providing CS/CGA which will be reviewed in FT prior to dc, however, d/c currently on hold  Also recommend FF setup upon dc which pt has for safety  Reviewed with pt energy conservation techniques this session as well which pt receptive to  At this time POC to cont to focus on gait trng with RW and SPC, improving pt's endurance and overall strength to increase ease with completing mobilities, balance interventions to reduce risk for falls, and stair trng so pt can eventually reach 2nd floor of home where bedroom is located  Will reschedule FT based upon when d/c is set  Family/Caregiver Present no   Problem List Decreased strength;Decreased endurance; Impaired balance;Decreased mobility   PT Barriers   Functional Limitation Stair negotiation;Car transfers   Plan   Treatment/Interventions Functional transfer training;LE strengthening/ROM; Elevations; Therapeutic exercise; Endurance training;Patient/family training;Bed mobility;Gait training   Progress Progressing toward goals   Recommendation   PT Discharge Recommendation Home with home health rehabilitation   Equipment Recommended Walker; Wheelchair   Discharge Summary pt d/c on hold at this time, FT cancelled for tomorrow   PT Therapy Minutes   PT Time In 1230   PT Time Out 1400   PT Total Time (minutes) 90   PT Mode of treatment - Individual (minutes) 90   PT Mode of treatment - Concurrent (minutes) 0   PT Mode of treatment - Group (minutes) 0   PT Mode of treatment - Co-treat (minutes) 0   PT Mode of Treatment - Total time(minutes) 90 minutes   PT Cumulative Minutes 1120   Therapy Time missed   Time missed?  No

## 2023-03-09 NOTE — PROGRESS NOTES
Progress Note - Infectious Disease   Leobardo Camarena 61 y o  male MRN: [id number]  Unit/Bed#: -01 Encounter: [id number]      Impression/Recommendations:  SIRS versus sepsis  WBC, HR, low-grade temp   Consider due to ongoing intraabdominal processes as below   Consider infection given temporal response to antibiotics   Consider tumor fever in setting of known liver metastases   Clinically stable and non-toxic   Now remains stable off antibiotics for some time  Rec:  • Continue to follow closely off antibiotics  • Follow temperatures closely  • Follow clinical status closely now that radha drain capped  • If develops T>101 check blood cultures and consider repeat CT A/P     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  Rainer Ragland setting of chronic cholecystostomy tube for possible cholecystitis, perihepatic abscess as below   Radha tube now with drainage consistent with this  Repeat tube check 3/7 shows no evidence of fistula    Tube now capped      Recent intra-abdominal abscess     In the setting complex surgical history as below   Initially developed 11/2022 accompanied by ESBL E  coli bacteremia   Status post exploratory laparotomy, right hemicolectomy, temporary abdominal closure 11/16; re-exploration, partial left colectomy, primary ileocolonic anastomosis with proximal diverting loop ileostomy, midline fascial closure on 11/17   Then developed abscess in hepatectomy bed, also collection +/- leak LUQ at area of prior colonic anastamosis   Status post IR drain into perihepatic collection 12/8   Cultures with ESBL E  Coli   Status post 7 days ertapenem   Developed recurrent sepsis and repeat CT 12/17 shows hepatic bed collection improved, persistent left intra-abdominal collection   Status post perigastic, perisplenic drains 12/20   Cultures again with ESBL E  Coli   Status post 21 days total antibiotics, completed 1/7   Left drains x2 removed 1/31   Midline hepatic bed drain removed 2/24   Collections improving off antibiotics         Chronic abdominal wound  In setting of prior incisional dehiscence   Status post recent debridement   Wound now appears clean      Metastatic colon cancer     To liver, possible lung   Status post resection, chemotherapy, more recent hepatic resection and ablation, transverse colon resection  Recent rise in bilirubin, chronically elevated alk phos  Rec:  • Follow up MRI/MRCP per GI     CKD     Baseline Cr 1 4       DM      The above plan was discussed in detail with the patient  The patient is stable from an ID standpoint for D/C home tomorrow      Antibiotics:  None    Subjective:  Patient seen on AM rounds  Feeling well today  Denies fevers, chills, sweats, nausea, vomiting, or diarrhea  24 Hour Events:  No documented fevers, chills, sweats, nausea, vomiting, or diarrhea  Objective:  Vitals:  Temp:  [98 3 °F (36 8 °C)-99 1 °F (37 3 °C)] 98 3 °F (36 8 °C)  HR:  [80-95] 95  Resp:  [16-18] 18  BP: (130-137)/(68-81) 135/78  SpO2:  [95 %-97 %] 95 %  Temp (24hrs), Av 6 °F (37 °C), Min:98 3 °F (36 8 °C), Max:99 1 °F (37 3 °C)  Current: Temperature: 98 3 °F (36 8 °C)    Physical Exam:   General:  No acute distress  Psychiatric:  Awake and alert  Pulmonary:  Normal respiratory excursion without accessory muscle use  Abdomen:  Soft, nontender  Extremities:  No edema  Skin:  No rashes    Lab Results:  I have personally reviewed pertinent labs    Results from last 7 days   Lab Units 23  0647 23  0615 23  0549   POTASSIUM mmol/L 4 1 4 0 4 0   CHLORIDE mmol/L 105 105 105   CO2 mmol/L 18* 17* 17*   BUN mg/dL 19 22 24   CREATININE mg/dL 1 36* 1 54* 1 58*   EGFR ml/min/1 73sq m 56 49 47   CALCIUM mg/dL 8 3 8 1* 8 4   AST U/L 87* 90* 86*   ALT U/L 16 14 14   ALK PHOS U/L 972* 982* 966*     Results from last 7 days   Lab Units 23  0647 23  0549 23  0952   WBC Thousand/uL 16 32* 17 07* 15 85*   HEMOGLOBIN g/dL 7 4* 7 6* 7 7*   PLATELETS Thousands/uL 357 351 338     Results from last 7 days   Lab Units 03/08/23  1629   C DIFF TOXIN B BY PCR  Negative       Imaging Studies:   I have personally reviewed pertinent imaging study reports and images in PACS  EKG, Pathology, and Other Studies:   I have personally reviewed pertinent reports

## 2023-03-09 NOTE — PROGRESS NOTES
03/09/23 1100   Restrictions/Precautions   Precautions Fall Risk;Contact/isolation;Multiple lines;Pain  (ostomy)   Weight Bearing Restrictions No   ROM Restrictions No   Braces or Orthoses   (TEDs)   Lifestyle   Autonomy "I think maybe I'll just trim it" referring to facial hair   Grooming   Able To Shave; Initiate Tasks; Acquire Items   Limitation Noted In Strength   Findings seated in w/c at sink, pt completes grooming task of shaving/trimming beard  pt requires inc time to complete task using electric shaver  pt reports he will be able to sit at home to complete grooming/oral care tasks  stand julia remains limited due to impaired strength and endurance  Sit to Stand   Type of Assistance Needed Independent   Physical Assistance Level No physical assistance   Comment RW   Sit to Stand CARE Score 6   Bed-Chair Transfer   Type of Assistance Needed Independent   Physical Assistance Level No physical assistance   Comment RW   Chair/Bed-to-Chair Transfer CARE Score 6   Cognition   Overall Cognitive Status WFL   Arousal/Participation Alert; Cooperative   Attention Within functional limits   Orientation Level Oriented X4   Memory Within functional limits   Following Commands Follows all commands and directions without difficulty   Activity Tolerance   Activity Tolerance Patient limited by fatigue   Assessment   Treatment Assessment pt engages in brief 30 minute skilled OT Session focusing on grooming tasks seated in w/c at sink  see above for full func details  pt reports limited sleep last night and so is "really tired" this morning  grooming tasks completed seated due to ongoing impaired strength and endurance  pt reports though that grooming tasks can be completed seated at home until standing tolerance/strength improves  pt d/c remains tentative for tomorrow pending medical stability   continue with OT POC to focus on ADL retraining specifically ostomy care, strength/endurance, standing julia/bal, in order to decrease burden of care at d/c  Prognosis Good   Problem List Decreased strength;Decreased endurance; Impaired balance;Decreased mobility   Barriers to Discharge Inaccessible home environment   Plan   Treatment/Interventions ADL retraining;Functional transfer training; Therapeutic exercise; Endurance training;Patient/family training;Equipment eval/education; Compensatory technique education   OT Therapy Minutes   OT Time In 1100   OT Time Out 1130   OT Total Time (minutes) 30   OT Mode of treatment - Individual (minutes) 30   OT Mode of treatment - Concurrent (minutes) 0   OT Mode of treatment - Group (minutes) 0   OT Mode of treatment - Co-treat (minutes) 0   OT Mode of Treatment - Total time(minutes) 30 minutes   OT Cumulative Minutes 810   Therapy Time missed   Time missed?  No

## 2023-03-09 NOTE — PROGRESS NOTES
[facility] PROGRESS NOTE   Eleni Nuñez 61 y o  male MRN: [id number]  Unit/Bed#: [facility] 783-86 Encounter: [id number]  Reason for Consult: Hyponatremia, elevated creatinine    ASSESSMENT and PLAN:  1  Elevated creatinine  · Had ALEXY in [facility] admission with SCr of 2 52 on admission which resolved  · SCr noted to be 1 66 on 3/6/23 - Does not meet ALEXY criteria but SCr is elevated  Etiology suspected to be prerenal azotemia  · Improved with IVF  · SCr down to 1 36 today  Back to baseline  · Stop IVF after current bag       2  Chronic kidney disease, stage III  · Baseline creatinine 1 2 to 1 4    · No prior renal follow up       3  Hyponatremia  · Na has been 128 to 130 the past 3 days but was 131 to 138 in [facility] admission  · Suspect volume mediated +/- SIADH  · Na up to 131 with IVF and salt tabs  · Stop IVF after current bag and monitor  · Change salt tabs to sodium bicarboante     4  Metabolic acidosis:  · Likely GI losses  · Stop IVF after current bag  · Start sodium bicarbonate 1300 mg BID      5  Orthostatic hypotension:  · Due to volume depletion  · Continue Midodrine 5 mg TID  · Monitor orthostatics  6  Cholecystitis  · s/p cholecystostomy tube  · Currently capped  · GI to eval for hyperbilirubinemia     7  Metastatic colon cancer  · s/p transverse colectomy, ileostomy placement  · Currently with high output      8  Recent intra-abdominal abscesses  9  Elevated alkaline phosphatase     DISPOSITION:  · Stop IVF after current bag  · Stop salt tabs   · Start sodium bicarbonate 1300 mg BID  · Continue Midodrine 5 mg TID  The highlighted and/or bolded points in my assessment, plan, and disposition were discussed with the primary team and they agree with those points and the plan  SUBJECTIVE / 24H INTERVAL HISTORY:  No new major issues overnight  Still with higher than usual stool output  No CP or SOB       OBJECTIVE:  Current Weight: Weight - Scale: 80 1 kg (176 lb 9 4 oz)  Vitals:    03/08/23 1128 03/08/23 1358 03/08/23 2111 03/09/23 0627   BP: 123/72 130/68 137/81 135/78   BP Location: Right arm Left arm Left arm Right arm   Pulse:  80 90 95   Resp:  16 18 18   Temp:  98 3 °F (36 8 °C) 99 1 °F (37 3 °C) 98 3 °F (36 8 °C)   TempSrc:  Oral Oral Oral   SpO2:  96% 97% 95%   Weight:       Height:           Intake/Output Summary (Last 24 hours) at 3/9/2023 1130  Last data filed at 3/9/2023 9277  Gross per 24 hour   Intake 1155 ml   Output 1525 ml   Net -370 ml     General: conscious, cooperative, no distress  Skin: dry  Eyes: pink conjunctivae  ENT: moist mucous membranes  Respiratory: equal chest expansion, clear breath sounds  Cardiovascular: distinct heart sounds, normal rate, regular rhythm, no rub  Abdomen: soft, non tender, (+) ileostomy, normal bowel sounds  Extremities: no edema  Genitourinary: no rivero catheter  Neuro: awake, alert     Psych: appropriate affect    Medications:    Current Facility-Administered Medications:   •  acetaminophen (TYLENOL) tablet 650 mg, 650 mg, Oral, Q4H PRN, Greenwood Louder, DO, 650 mg at 03/01/23 2158  •  aspirin chewable tablet 81 mg, 81 mg, Oral, Daily, Hair Louder, DO, 81 mg at 03/09/23 4009  •  atorvastatin (LIPITOR) tablet 40 mg, 40 mg, Oral, Daily, Greenwood Louder, DO, 40 mg at 03/09/23 0806  •  calcium carbonate (TUMS) chewable tablet 500 mg, 500 mg, Oral, TID PRN, GAURANG Keith, 500 mg at 03/01/23 1715  •  collagenase (SANTYL) ointment, , Topical, Daily, Hair Louder, DO, Given at 03/08/23 1113  •  DULoxetine (CYMBALTA) delayed release capsule 30 mg, 30 mg, Oral, Daily, Greenwood Louder, DO, 30 mg at 03/09/23 0806  •  enoxaparin (LOVENOX) subcutaneous injection 40 mg, 40 mg, Subcutaneous, Q24H SHANNON, GAURANG Keith, 40 mg at 03/09/23 0806  •  hydrOXYzine HCL (ATARAX) tablet 10 mg, 10 mg, Oral, HS, Hair Reyes DO, 10 mg at 03/08/23 2143  •  insulin glargine (LANTUS) subcutaneous injection 5 Units 0 05 mL, 5 Units, Subcutaneous, HS, [facility], DO, 5 Units at 03/06/23 2147  •  insulin lispro (HumaLOG) 100 units/mL subcutaneous injection 1-6 Units, 1-6 Units, Subcutaneous, TID AC, 1 Units at 03/08/23 1124 **AND** Fingerstick Glucose (POCT), , , 4x Daily AC and at bedtime, [facility],   •  insulin lispro (HumaLOG) 100 units/mL subcutaneous injection 1-6 Units, 1-6 Units, Subcutaneous, HS, [facility], DO, 1 Units at 03/06/23 2147  •  insulin lispro (HumaLOG) 100 units/mL subcutaneous injection 5 Units, 5 Units, Subcutaneous, TID With Meals, Shiva Ishan, CRNP, 5 Units at 03/09/23 0751  •  melatonin tablet 6 mg, 6 mg, Oral, HS PRN, [facility], , 6 mg at 02/28/23 2238  •  methocarbamol (ROBAXIN) tablet 500 mg, 500 mg, Oral, BID PRN, [facility], , 500 mg at 03/03/23 8004  •  midodrine (PROAMATINE) tablet 5 mg, 5 mg, Oral, TID AC, [place], GAURANG, 5 mg at 03/08/23 1700  •  [facility]) injection 4 mg, 4 mg, Intravenous, Q4H PRN, [facility], 4 mg at 03/09/23 0757  •  oxyCODONE (ROXICODONE) IR tablet 2 5 mg, 2 5 mg, Oral, Q4H PRN, [facility]  •  oxyCODONE (ROXICODONE) IR tablet 5 mg, 5 mg, Oral, Q4H PRN, [facility], , 5 mg at 03/09/23 0759  •  pantoprazole (PROTONIX) EC tablet 40 mg, 40 mg, Oral, BID AC, [facility], DO, 40 mg at 03/09/23 2893  •  simethicone (MYLICON) chewable tablet 80 mg, 80 mg, Oral, 4x Daily (with meals and at bedtime), Arash Salinas DO, 80 mg at 03/09/23 0751  •  sodium bicarbonate 75 mEq in sodium chloride 0 45 % 1,000 mL infusion, , Intravenous, Continuous, GAURANG Evans, Last Rate: 75 mL/hr at 03/09/23 0648, New Bag at 03/09/23 4034  •  sodium chloride tablet 2 g, 2 g, Oral, BID With Meals, GAURANG Olivo, 2 g at 03/09/23 1988  •  tamsulosin (FLOMAX) capsule 0 4 mg, 0 4 mg, Oral, Daily With Dinner, Arash Salinas DO, 0 4 mg at 03/08/23 1700    Laboratory Results:  Results from last 7 days   Lab Units 03/09/23  0647 03/08/23  0615 03/07/23  0549 03/06/23  4932 03/06/23  0633 03/03/23  0627   WBC Thousand/uL 16 32*  --  17 07* 15 85* 15 91* 13 23*   HEMOGLOBIN g/dL 7 4*  --  7 6* 7 7* 7 5* 9 4*   HEMATOCRIT % 23 6*  --  24 5* 24 8* 24 4* 30 2*   PLATELETS Thousands/uL 357  --  351 338 352 371   POTASSIUM mmol/L 4 1 4 0 4 0 4 1 3 8 3 8   CHLORIDE mmol/L 105 105 105 102 102 101   CO2 mmol/L 18* 17* 17* 18* 18* 19*   BUN mg/dL 19 22 24 26* 26* 16   CREATININE mg/dL 1 36* 1 54* 1 58* 1 66* 1 57* 1 08   CALCIUM mg/dL 8 3 8 1* 8 4 8 1* 8 1* 8 5

## 2023-03-10 ENCOUNTER — APPOINTMENT (OUTPATIENT)
Dept: RADIOLOGY | Facility: HOSPITAL | Age: 58
End: 2023-03-10

## 2023-03-10 LAB
ALBUMIN SERPL BCP-MCNC: 1.1 G/DL (ref 3.5–5)
ALP SERPL-CCNC: 984 U/L (ref 46–116)
ALT SERPL W P-5'-P-CCNC: 15 U/L (ref 12–78)
ANION GAP SERPL CALCULATED.3IONS-SCNC: 10 MMOL/L (ref 4–13)
AST SERPL W P-5'-P-CCNC: 86 U/L (ref 5–45)
BASOPHILS # BLD AUTO: 0.06 THOUSANDS/ÂΜL (ref 0–0.1)
BASOPHILS NFR BLD AUTO: 0 % (ref 0–1)
BILIRUB SERPL-MCNC: 1.93 MG/DL (ref 0.2–1)
BUN SERPL-MCNC: 20 MG/DL (ref 5–25)
CALCIUM ALBUM COR SERPL-MCNC: 10.5 MG/DL (ref 8.3–10.1)
CALCIUM SERPL-MCNC: 8.2 MG/DL (ref 8.3–10.1)
CHLORIDE SERPL-SCNC: 102 MMOL/L (ref 96–108)
CO2 SERPL-SCNC: 18 MMOL/L (ref 21–32)
CREAT SERPL-MCNC: 1.38 MG/DL (ref 0.6–1.3)
EOSINOPHIL # BLD AUTO: 0 THOUSAND/ÂΜL (ref 0–0.61)
EOSINOPHIL NFR BLD AUTO: 0 % (ref 0–6)
ERYTHROCYTE [DISTWIDTH] IN BLOOD BY AUTOMATED COUNT: 17.2 % (ref 11.6–15.1)
GFR SERPL CREATININE-BSD FRML MDRD: 55 ML/MIN/1.73SQ M
GLUCOSE P FAST SERPL-MCNC: 116 MG/DL (ref 65–99)
GLUCOSE SERPL-MCNC: 112 MG/DL (ref 65–140)
GLUCOSE SERPL-MCNC: 116 MG/DL (ref 65–140)
GLUCOSE SERPL-MCNC: 116 MG/DL (ref 65–140)
GLUCOSE SERPL-MCNC: 85 MG/DL (ref 65–140)
GLUCOSE SERPL-MCNC: 93 MG/DL (ref 65–140)
HCT VFR BLD AUTO: 23.4 % (ref 36.5–49.3)
HGB BLD-MCNC: 7.3 G/DL (ref 12–17)
IMM GRANULOCYTES # BLD AUTO: 0.27 THOUSAND/UL (ref 0–0.2)
IMM GRANULOCYTES NFR BLD AUTO: 2 % (ref 0–2)
LYMPHOCYTES # BLD AUTO: 0.95 THOUSANDS/ÂΜL (ref 0.6–4.47)
LYMPHOCYTES NFR BLD AUTO: 6 % (ref 14–44)
MCH RBC QN AUTO: 26.2 PG (ref 26.8–34.3)
MCHC RBC AUTO-ENTMCNC: 31.2 G/DL (ref 31.4–37.4)
MCV RBC AUTO: 84 FL (ref 82–98)
MONOCYTES # BLD AUTO: 1.58 THOUSAND/ÂΜL (ref 0.17–1.22)
MONOCYTES NFR BLD AUTO: 10 % (ref 4–12)
NEUTROPHILS # BLD AUTO: 13.23 THOUSANDS/ÂΜL (ref 1.85–7.62)
NEUTS SEG NFR BLD AUTO: 82 % (ref 43–75)
NRBC BLD AUTO-RTO: 0 /100 WBCS
PLATELET # BLD AUTO: 350 THOUSANDS/UL (ref 149–390)
PMV BLD AUTO: 9.5 FL (ref 8.9–12.7)
POTASSIUM SERPL-SCNC: 4.2 MMOL/L (ref 3.5–5.3)
PROT SERPL-MCNC: 6.4 G/DL (ref 6.4–8.4)
RBC # BLD AUTO: 2.79 MILLION/UL (ref 3.88–5.62)
SODIUM SERPL-SCNC: 130 MMOL/L (ref 135–147)
WBC # BLD AUTO: 16.09 THOUSAND/UL (ref 4.31–10.16)

## 2023-03-10 RX ORDER — SODIUM BICARBONATE 650 MG/1
1300 TABLET ORAL
Status: DISCONTINUED | OUTPATIENT
Start: 2023-03-10 | End: 2023-03-14 | Stop reason: HOSPADM

## 2023-03-10 RX ADMIN — SODIUM BICARBONATE 1300 MG: 650 TABLET ORAL at 18:14

## 2023-03-10 RX ADMIN — INSULIN LISPRO 5 UNITS: 100 INJECTION, SOLUTION INTRAVENOUS; SUBCUTANEOUS at 11:47

## 2023-03-10 RX ADMIN — PANTOPRAZOLE SODIUM 40 MG: 40 TABLET, DELAYED RELEASE ORAL at 15:51

## 2023-03-10 RX ADMIN — MIDODRINE HYDROCHLORIDE 5 MG: 5 TABLET ORAL at 11:51

## 2023-03-10 RX ADMIN — SIMETHICONE 80 MG: 80 TABLET, CHEWABLE ORAL at 11:51

## 2023-03-10 RX ADMIN — ONDANSETRON 4 MG: 2 INJECTION INTRAMUSCULAR; INTRAVENOUS at 08:35

## 2023-03-10 RX ADMIN — SODIUM BICARBONATE 650 MG TABLET 1300 MG: at 08:25

## 2023-03-10 RX ADMIN — SODIUM BICARBONATE 1300 MG: 650 TABLET ORAL at 13:04

## 2023-03-10 RX ADMIN — PANTOPRAZOLE SODIUM 40 MG: 40 TABLET, DELAYED RELEASE ORAL at 06:37

## 2023-03-10 RX ADMIN — SIMETHICONE 80 MG: 80 TABLET, CHEWABLE ORAL at 15:51

## 2023-03-10 RX ADMIN — SIMETHICONE 80 MG: 80 TABLET, CHEWABLE ORAL at 21:55

## 2023-03-10 RX ADMIN — COLLAGENASE SANTYL: 250 OINTMENT TOPICAL at 15:42

## 2023-03-10 RX ADMIN — INSULIN GLARGINE 5 UNITS: 100 INJECTION, SOLUTION SUBCUTANEOUS at 21:55

## 2023-03-10 RX ADMIN — ATORVASTATIN CALCIUM 40 MG: 40 TABLET, FILM COATED ORAL at 08:25

## 2023-03-10 RX ADMIN — ASPIRIN 81 MG CHEWABLE TABLET 81 MG: 81 TABLET CHEWABLE at 08:24

## 2023-03-10 RX ADMIN — MIDODRINE HYDROCHLORIDE 5 MG: 5 TABLET ORAL at 15:51

## 2023-03-10 RX ADMIN — INSULIN LISPRO 5 UNITS: 100 INJECTION, SOLUTION INTRAVENOUS; SUBCUTANEOUS at 08:26

## 2023-03-10 RX ADMIN — HYDROXYZINE HYDROCHLORIDE 10 MG: 10 TABLET ORAL at 21:55

## 2023-03-10 RX ADMIN — TAMSULOSIN HYDROCHLORIDE 0.4 MG: 0.4 CAPSULE ORAL at 15:51

## 2023-03-10 RX ADMIN — ENOXAPARIN SODIUM 40 MG: 40 INJECTION SUBCUTANEOUS at 08:23

## 2023-03-10 RX ADMIN — DULOXETINE HYDROCHLORIDE 30 MG: 30 CAPSULE, DELAYED RELEASE ORAL at 08:25

## 2023-03-10 RX ADMIN — OXYCODONE HYDROCHLORIDE 5 MG: 5 TABLET ORAL at 21:55

## 2023-03-10 RX ADMIN — OXYCODONE HYDROCHLORIDE 5 MG: 5 TABLET ORAL at 18:14

## 2023-03-10 RX ADMIN — ONDANSETRON 4 MG: 2 INJECTION INTRAMUSCULAR; INTRAVENOUS at 13:09

## 2023-03-10 RX ADMIN — OXYCODONE HYDROCHLORIDE 5 MG: 5 TABLET ORAL at 08:26

## 2023-03-10 RX ADMIN — ONDANSETRON 4 MG: 2 INJECTION INTRAMUSCULAR; INTRAVENOUS at 18:15

## 2023-03-10 RX ADMIN — SIMETHICONE 80 MG: 80 TABLET, CHEWABLE ORAL at 08:24

## 2023-03-10 NOTE — PROGRESS NOTES
PM&R PROGRESS NOTE:  Bob Mcginnis 61 y o  male MRN: [id number]  Unit/Bed#: -34 Encounter: [id number]        Rehabilitation Diagnosis: Impairment of mobility, safety and Activities of Daily Living (ADLs) due to Debility:  16  Debility (Non-cardiac/Non-pulmonary)    HPI: Lexis Clark is a 61 y o  male with a history of hyperlipidemia, hypertension, GERD, ventral hernia, colon adenocarcinoma that initially presented to [facility] for an elective surgical procedure with Dr Cnydi Tipton on 11/7/22  Patient presented to hem/onc on  9/22 when CT abdomen revealed transverse colonic apple core lesion and innumerable hepatic metastases  MRI revealed multiple hepati metastasis with smaller lesions in left lobe and larger lesions on the right lobe  On 11/7, patient underwent exploratory laparotomy, segment 3 liver resection, ablation, intraoperative ultrasound of the liver  This was complicated by left upper quadrant hematoma, imaging showed suspected leak from transverse colon anastomosis  On 11/16, patient underwent exploratory lap revealing hematoma, defect in transverse colon anastomosis with extravasation of succus and a dilated cecum requiring resection  He had a right hemicolectomy, left in discontinuity and temporary abdominal closure device  On 11/17, patient underwent re-exploratory, partial descending colectomy, primary colonic anastomosis created with diverting loop ileostomy and midline wound vac placement  An intra-abdominal abscess was positive for ESBL and patient completed prolonged course of antibiotics  He was discharged to SNF on 12/3 and returned to hospital on 12/4 for increased abdominal pain  Patient monitored off of antibiotics  On 12/6 patient was noted with an increased creatinine level, received IVF bolus with improvement  Patient developed tachycardia, increased abdominal pain, and incision with yellow drainage noted  CT AP showed abdominal abscess and distended gallbladder   ID was consulted, patient was continued on IV antibiotics through 12/15  Patient went to IR for percutaneous cholecystostomy tube insertion and hepatectomy abscess drainage  Nephrology consulted and initiated sodium bicarb gtt and IV albumin  On 12/14, patient midline/left chest wall pain, below abscess drain  Cardiac work-up completed  Troponin level 57 and CXR showed progressive bilateral opacities suspicious for CHF and small left pleural effusion  Cariology consulted with chest pain appearing musculoskeletal no further work up warranted  Patient was deemed medically stable and admitted to [address] [place] [address] on 12/14/22  He was discharged from [facility] on 01/17/23  Patient [facility] to [facility] on 02/07 with increased weakness for two weeks with 2 falls noted  No injury noted  He was found to be septic with the main source of midline abdominal wound which was with foul smelling drainage  CT showed improvement in fluid collection at the left liver segment, perisplenic, left retroperitoneum, interior to the spleen  Patient was treated with broad spectrum antibiotics and eventually placed on Ertapenem  Hospital course complicated by fevers and leukocytosis  Repeat imaging concerning for cholecystitis  Patient underwent cystostomy tube exchange and Pattock drain was removed on 02/24  The patient was evaluated by the Rehabilitation team and deemed an appropriate candidate for an inpatient acute rehabilitation program  Patient was admitted on 02/24/23  SUBJECTIVE: Patient seen face to face  No acute issues overnight  Slept well, abdominal discomfort ongoing, worse with movement and sitting upright  Pain is managed on current pain regimen  Ileostomy with soft unformed stool  Denies chest pain, shortness of breath, fever, chills, nausea  24 hour total  Cholecystostomy drain- capped  Ileostomy-  550 cc    Therapy reported symptomatic orthostatic hypotension, midodrine held this am as BP did not meet parameters  IM aware  nephrology notified, hold parameter adjusted  ASSESSMENT: Stable, progressing    PLAN:  - leukocytosis, alk phos, AST, TB stable; Hbg 7 3    - MRCP- per MRI will be performed tonight between [id number]  - labs 03/13  - Continue with current rehabilitation and medical plan of care  Rehabilitation  • Functional deficits:  Mobility, self care  • Continue current rehabilitation plan of care to maximize function  • Functional update:   · PT: mobility, transfers, and ambulation- independent with RW  · OT: ADL- incidental touching for bathing, lower body dressing- min A  • Estimated Discharge: TBD    Pain  • Tylenol, oxycodone    DVT prophylaxis  • Lovenox    Bladder plan  • Continent    Bowel plan  • Ileostomy    Malignant neoplasm of transverse colon [facility])  Assessment & Plan  Complex history with chronological details below:    2/22: S/P diverting colostomy, liver biopsy +Chemo tx  11/7/22: Hepatic resection and colostomy reversal  11/16/22: Exploratory laparotomy with bowel resection and VAC placement   11/17/22: Right hemicolectomy, partial descending colon resection, colocolonic anastomosis with loop ileosotmy and midline VAC placement  12/20/22: Drains placed, total of 3 drains + perc radha tube + ileostomy  1/31/23: 2 of 3 drains removed  This hospital stay had fever/leukocytosis/sepsis, started Meropenem and then to Vancomycin and Ertapenem  Wound cx again = Ecoli ESBL  LD Ertapenem 2/14, Vancomycin 2/16 2/24: Abscessogram of midline drain showed minimal persistent collection and the midline catheter was removed   Perc Radha tube exchanged  03/06- pending IR cholecystostomy tube check    • Patient does not wish to follow with [facility] oncology anymore as it is too far of a drive; would like to establish with [facility] oncology (already follows with Dr Feliciano Barlow)  • Follows with Dr Gabrielle Darnell as outpt      * Sepsis [facility])  Assessment & Plan  Noted the development of foul-smelling drainage and increasing pain from his chronic abdominal wound with leukocytosis, tachypnea, tachycardia  • Purulent drainage from abdominal wound  • Hx of colon CA w/ extensive abd surgical- adenocarcinoma of transverse colon section  • Patient has several abdominal drains in place, with CT showing improvement in the fluid collections at the left liver lobe, perisplenic area, and the left retroperitoneum inferior to the spleen  • Completed Vancomycin and Ertapenem  • S/p bedside midline wound debridement + wet dressing 2/8 with general surgery  • S/p IR tube check 2/17/2023, drains kept in place  Discussed with IR, follow up in 1 month  • Given ongoing low grade fever and worsening leukocytosis, ordered repeat blood cultures and repeat CT a/p    CT a/p showing moderate R pleural effusion, imaging concerning for cholecystitis  Discussed case with surgery, IR  IR will reposition drain today- Radha tube check showed retraction of the radha tube   Interval occlusion of the cystic duct   Dino-enteric fistula persisted  Exchange of radha tube 2/24  • Continue to monitor off of antibiotics, low threshold to re-consult ID, but stable at this time  • Debility from Sepsis in addition to 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  • Pain control, anxiety management  • PT/OT  • 03/06 WBC 15 85, Hbg 7 7, Na+ 128, creatinine 1 66, total bilirubin, AST/ALT/Alk Phos elevated  • Consulted GI, deferred to IR for tube check  • IVF NS 1L  - Cholecystostomy drain check findings: diagnostic cholangiogram demonstrating filling of cystic and common bile duct, minimal pain, no leaking, slight adjustment of tube further into gallbladder body   Tube capped, can consider removal if patient passes capping trial in 2 weeks and if surgery is in agreement  - c/s Surgical Oncology, Dr Petrona Foss in agreement with IR recommendations  - GI consulted, recommending MRI/MRCP r/o obstruction 03/09, trend LFT  -nephrology consulted- sodium bicarb 1300 mg BID, d/c sodium tabs, midodrine 5 mg      Chronic bilateral pleural effusions  Assessment & Plan  Hx of pleural effusions with   Interval worsening of moderate right and small left pleural effusions  There is associated bibasilar atelectasis based on CTAP on 2/22  Monitor oxygen and breathing, may benefit from thoracentesis    Depression  Assessment & Plan  Continue Cymbalta  Provide supportive counseling and encouragement, easily tearful  Neuropsychology consultation for support    Cholecystitis, unspecified  Assessment & Plan  Subacute at this point, but still requiring per radha tube  • s/p percutaneous tube placement 12/8/22 asnd recent exchange on 2/24/23  • CT scan was concerning for cholecystitis --> to IR 2/24 = showed retraction of the radha tube and interval occlusion of the cystic duct   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  Radha tube was exchanged  • Monitor output and pain  • 3/7 cholecystostomy drain check- advanced slightly and capped; consider removal in 2 wks if tolerates capping  • Dr Izabel Cervantes in agreement    Abdominal wound dehiscence  Assessment & Plan  Midline abdominal wound with initially foul smelling drainage  Now improved per patient with red granular base and slough noted  Santyl and wet to dry dressing  Consult wound care   Daily dressing changes    Nephrolithiasis  Assessment & Plan  RUQ US revealed 7 mm nonobstructing right intrarenal calculus  No hydronephrosis noted on CT a/p  Asymptomatic at this time  • Flomax  • Monitor outpt, symptoms    Hyponatremia  Assessment & Plan  • Mild, most recently 130 3/10    • [place] likely2/2 SIADH in setting of malignancy  • Sodium chloride tab 1 BID  • Monitor BMP  • FR 1800    Elevated alkaline phosphatase level  Assessment & Plan  Chronically elevated likely in setting of known hepatic metastasis  • RUQ US Decompressed gallbladder around a cholecystostomy tube, limiting evaluation  Hepatomegaly   Heterogeneous echotexture of the liver in this patient with known metastatic disease  7 mm nonobstructing right intrarenal calculus  • Monitor with am CMP  • GI consulted; MRI/MRCP to r/o obstruction    Leukocytosis  Assessment & Plan  WBC 16 09 3/10  Mildly above normal, trendiong down, however has a history of spiking and dropping  Monitor labs however need to follow clinical exam and vitals as it may spike and drop again  Monitor off antibiotics  ID following  If Temp >101 blood cultures and consider CT A/P  R/o C diff 3/8, negative    Abscess  Assessment & Plan  Smaller abscesses on recent studies now with no active drains  At risk for further complications  Lower theshold for abdominal CT if develops fevers or leukocytosis    Acute on chronic kidney failure ([facility] [address] )  Assessment & Plan  • POA with creatinine 2 52; baseline 1-1 3  • Suspecting possible multifactorial cause in setting of dehydration and sepsis  • Improving back into baseline levels, Continue to monitor on labs, minimize any relative hypotension  Avoid nephrotoxic agents  • Creatinine 1 66, IV fluids, recheck BMP tomorrow  • Stable    Anemia  Assessment & Plan  Hbg 7 3 (previous 7 4, 9 5, 8 3)  Required transfusion on 2/16  Order type and screen, 1 unit pRBC (2/26)  Monitor CBC    Mixed hyperlipidemia  Assessment & Plan  Continue statin    Benign essential hypertension  Assessment & Plan  Monitor pressures in therapy    Type 2 diabetes mellitus without complication, with long-term current use of insulin [facility])  Assessment & Plan  Lab Results   Component Value Date    HGBA1C 8 0 (H) 09/26/2022       Recent Labs     02/24/23  0839 02/24/23  1137 02/24/23  1626 02/24/23  2040   POCGLU 106 105 152* 120     • Home:  Lantus 8U qam/Lispro 4U TID  • Here:  Lantus 5U qhs/Lispro 5U TID, SSI  • Has a DEXCOM meter and a regular meter at home  • Continue DM diet and QID Accuchecks/SSI    Appreciate IM consultants medical co-management  Labs, medications, and imaging reviewed      ROS:  Review of Systems   A 10 point review of systems was negative except for what is noted in the HPI  OBJECTIVE:   /72 (BP Location: Right arm)   Pulse (!) 113   Temp 98 3 °F (36 8 °C) (Oral)   Resp 17   Ht 5' 9" (1 753 m)   Wt 80 1 kg (176 lb 9 4 oz)   SpO2 93%   BMI 26 08 kg/m²     Physical Exam  Constitutional:       Appearance: Normal appearance  HENT:      Head: Normocephalic and atraumatic  Mouth/Throat:      Mouth: Mucous membranes are moist    Cardiovascular:      Rate and Rhythm: Normal rate and regular rhythm  Pulses: Normal pulses  Pulmonary:      Effort: Pulmonary effort is normal       Breath sounds: Normal breath sounds  Abdominal:      General: Bowel sounds are normal       Palpations: Abdomen is soft  Tenderness: There is abdominal tenderness  Musculoskeletal:         General: Normal range of motion  Cervical back: Normal range of motion  Skin:     General: Skin is warm and dry  Capillary Refill: Capillary refill takes less than 2 seconds  Coloration: Skin is jaundiced  Neurological:      Mental Status: He is alert and oriented to person, place, and time  Motor: Weakness present        Comments: Secondary to fatigue   Psychiatric:         Mood and Affect: Mood normal          Judgment: Judgment normal         Lab Results   Component Value Date    WBC 16 09 (H) 03/10/2023    HGB 7 3 (L) 03/10/2023    HCT 23 4 (L) 03/10/2023    MCV 84 03/10/2023     03/10/2023     Lab Results   Component Value Date    SODIUM 130 (L) 03/10/2023    K 4 2 03/10/2023     03/10/2023    CO2 18 (L) 03/10/2023    BUN 20 03/10/2023    CREATININE 1 38 (H) 03/10/2023    GLUC 116 03/10/2023    CALCIUM 8 2 (L) 03/10/2023     Lab Results   Component Value Date    INR 1 32 (H) 02/07/2023    INR 1 12 12/19/2022    INR 1 10 11/12/2022    PROTIME 16 1 (H) 02/07/2023    PROTIME 14 7 (H) 12/19/2022    PROTIME 14 4 11/12/2022       Current Facility-Administered Medications:   •  acetaminophen (TYLENOL) tablet 650 mg, 650 mg, Oral, Q4H PRN, Mari Nazario DO, 650 mg at 03/01/23 2158  •  aspirin chewable tablet 81 mg, 81 mg, Oral, Daily, Mari Nazario DO, 81 mg at 03/10/23 2485  •  atorvastatin (LIPITOR) tablet 40 mg, 40 mg, Oral, Daily, Mari Nazario DO, 40 mg at 03/10/23 0825  •  calcium carbonate (TUMS) chewable tablet 500 mg, 500 mg, Oral, TID PRN, GAURANG Keith, 500 mg at 03/01/23 1715  •  collagenase (SANTYL) ointment, , Topical, Daily, Mari Nazario DO, Given at 03/09/23 1418  •  DULoxetine (CYMBALTA) delayed release capsule 30 mg, 30 mg, Oral, Daily, Mari Nazario DO, 30 mg at 03/10/23 0825  •  enoxaparin (LOVENOX) subcutaneous injection 40 mg, 40 mg, Subcutaneous, Q24H [address], Jenelle Martin, RANNP, 40 mg at 03/10/23 7938  •  hydrOXYzine HCL (ATARAX) tablet 10 mg, 10 mg, Oral, HS, Mari Nazario DO, 10 mg at 03/09/23 2124  •  insulin glargine (LANTUS) subcutaneous injection 5 Units 0 05 mL, 5 Units, Subcutaneous, HS, Mari Nazario DO, 5 Units at 03/09/23 2123  •  insulin lispro (HumaLOG) 100 units/mL subcutaneous injection 1-6 Units, 1-6 Units, Subcutaneous, TID AC, 1 Units at 03/08/23 1124 **AND** Fingerstick Glucose (POCT), , , 4x Daily AC and at bedtime, Mari Nazario DO  •  insulin lispro (HumaLOG) 100 units/mL subcutaneous injection 1-6 Units, 1-6 Units, Subcutaneous, HS, Mari Nazario DO, 1 Units at 03/06/23 2147  •  insulin lispro (HumaLOG) 100 units/mL subcutaneous injection 5 Units, 5 Units, Subcutaneous, TID With Meals, RAN AcevedoNP, 5 Units at 03/10/23 1147  •  melatonin tablet 6 mg, 6 mg, Oral, HS PRN, Mari Nazario DO, 6 mg at 02/28/23 2238  •  methocarbamol (ROBAXIN) tablet 500 mg, 500 mg, Oral, BID PRN, Mari Nazario DO, 500 mg at 03/03/23 0804  •  midodrine (PROAMATINE) tablet 5 mg, 5 mg, Oral, TID Lo HAYES CRNP, 5 mg at 03/10/23 1151  •  ondansetron (ZOFRAN) injection 4 mg, 4 mg, Intravenous, Q4H PRN, Mari Nazario DO, 4 mg at 03/10/23 0835  • oxyCODONE (ROXICODONE) IR tablet 2 5 mg, 2 5 mg, Oral, Q4H PRN, Adrianne Copa, DO  •  oxyCODONE (ROXICODONE) IR tablet 5 mg, 5 mg, Oral, Q4H PRN, Adrianne Copa, DO, 5 mg at 03/10/23 9712  •  pantoprazole (PROTONIX) EC tablet 40 mg, 40 mg, Oral, BID AC, Adrianne Copa, DO, 40 mg at 03/10/23 6931  •  simethicone (MYLICON) chewable tablet 80 mg, 80 mg, Oral, 4x Daily (with meals and at bedtime), Adrianne Copa, DO, 80 mg at 03/10/23 1151  •  sodium bicarbonate tablet 1,300 mg, 1,300 mg, Oral, TID after meals, Evi Pandya MD  •  tamsulosin [facility]) capsule 0 4 mg, 0 4 mg, Oral, Daily [facility], DO, 0 4 mg at 03/09/23 1625    Past Medical History:   Diagnosis Date   • Abdominal pain 03/12/2022   • Acute renal failure ([place] [address] )     [id number] resolved   • Acute respiratory failure with hypoxia ([place] [address] ) 11/12/2022   • Bacteremia 11/12/2022   • Cancer ([place] [address] )    • Diabetes mellitus ([place] [address] )    • Enteritis 08/23/2016   • Flash pulmonary edema ([place] [address] ) 11/12/2022   • Gastroparesis due to DM ([place] [address] ) 08/23/2016   • GERD (gastroesophageal reflux disease)    • Hernia, ventral 08/04/2016   • Hyperlipidemia    • Hypertension    • Morbid obesity ([place] [address] ) 04/17/2018   • Postoperative visit 03/02/2022   • SIRS (systemic inflammatory response syndrome) ([place] [address] ) 03/12/2022   • Snoring    • Stage 3a chronic kidney disease ([place] [address] ) 02/19/2022       Patient Active Problem List    Diagnosis Date Noted   • Malignant neoplasm of transverse colon ([facility] ) 03/01/2022   • Sepsis ([place] [address] ) 12/17/2022   • 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 liver ([place] [address] ) 03/02/2022   • Abdominal wound dehiscence 02/24/2023   • Cholecystitis, unspecified 02/24/2023   • Depression 02/24/2023   • Chronic bilateral pleural effusions 02/24/2023   • Nephrolithiasis 02/09/2023   • Abnormal CT scan 02/07/2023   • Hyponatremia 02/07/2023   • Dehiscence of incision 12/30/2022   • Elevated alkaline phosphatase level 12/30/2022   • Leukocytosis 12/29/2022   • Abscess 12/27/2022   • Acute pain 12/27/2022   • Severe protein-calorie malnutrition ([facility] ) 12/14/2022   • Acute on chronic kidney failure ([facility] ) 12/14/2022   • MR (mitral regurgitation) 11/12/2022   • ESBL (extended spectrum beta-lactamase) producing bacteria infection 11/12/2022   • Encephalopathy 11/09/2022   • Cervical radiculopathy 10/26/2022   • Other fatigue 06/15/2022   • Colostomy prolapse ([facility] ) 05/27/2022   • Colon cancer metastasized to liver ([place] [address] ) 03/12/2022   • Iron deficiency anemia, unspecified 03/01/2022   • Thrombocytosis 02/21/2022   • Hypokalemia 02/19/2022   • Transaminitis 02/19/2022   • Type 2 diabetes mellitus with hyperlipidemia ([place] [address] ) 02/18/2022   • Anemia 02/18/2022   • Left ureteral calculus 01/30/2020   • Incarcerated umbilical hernia 87/44/5252   • Testicular hypogonadism 06/19/2017   • Low testosterone 05/30/2017   • Type 2 diabetes mellitus without complication, with long-term current use of insulin ([place] [address] ) 08/23/2016   • Benign essential hypertension 08/23/2016   • Mixed hyperlipidemia 08/23/2016   • Erectile dysfunction 07/11/2016   • Obesity (BMI 30-39 9) 07/11/2016        GAURANG Jang  Physical Medicine and [place] [id number]

## 2023-03-10 NOTE — PROGRESS NOTES
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  • s/p diverting loop colostomy/liver biopsy with subsequent chemotherapy 2/2022  • s/p hepatic resection and reversal of colostomy on 11/7/22  • s/p ex-lap with bowel resection/VAC placement 11/16/22  • s/p right hemicolectomy with partial descending colectomy, colocolonic anastomosis with loop ileostomy and mid line abdominal VAC placement 11/17  • s/p additional drains placed for a perisplenic abscess and splenic abscess 12/20/22 (then had 3 drains plus the already existing perc radha tube)  • The 2 left lateral drains were removed by IR 1/31/23 as an OP  • Had previously been tx'd with Ertapenem  • This hospital stay had fever/leukocytosis/sepsis, started Meropenem and then to Vancomycin and Ertapenem  • Wound cx again = Ecoli ESBL  • LD Ertapenem 2/14, Vancomycin 2/16  • To IR 2/24 = Abscessogram of midline drain showed minimal persistent collection and the midline catheter was removed  • Recent wound debridement by general surgery prior to transfer = continue Santyl qd to wound  • Has chr elevated Alk phos 700's to 800s, 900s again today and stable     Acute cholecystitis  • s/p percutaneous tube placement 12/8/22  • CT scan was concerning for cholecystitis --> to IR 2/24 = showed retraction of the radha tube and interval occlusion of the cystic duct   +Cholecysto enteric fistula   Radha tube was exchanged   Surgery saw 2/27/23 = stable  • Drainage from perc radha tube milky light green (20ml on 3/4/23 and nothing recorded from 3/5 or 3/6 although he says they did dump some from it on 3/5)  • Perc radha tube check 3/7/23 --> slight adjustment of tube further into GB body and tube is capped = may be removed if pt passes capping trial in 2 weeks and if surgery is in agreement --> Jenelle Jain from [facility] TT'd Dr Valentina Oviedo 3/7/23 and he was OK with that plan  • For MRCP this evening for rising total bilirubin      PORT catheter  • On 2/24/23 in IR = port check showed probable small thrombus at tip of cathter, flushed/improved aspiration of the port     HTN/orthostasis/tachycardia  • D/c'd Norvasc 2 5mg qd since BPs were too low to get  • Continue TEDS  • HRs are now in 80s to 90s after hydration    • TSH on 3/3/23 = 1 35  • Started Midodrine 2 5mg BID on 3/2/23 (give at 0600, 1100) --> held last 2 mornings since SBP was 130s  • His SBP in 80s today in OT with +sx = since 0600 dose was held for SBP will change the hold parameter to > or equal to 346 systolic (d/w Dr Kaylee Au)     Increased ileostomy drainage  • Had not been an issue prior  • Output 3/8 was 1325ml  • cdiff negative 3/8/23     Diabetes mellitus  • Home:  Lantus 8U qam/Lispro 4U TID  • Here:  Lantus 5U qhs/Lispro 5U TID  • Has a DEXCOM meter and a regular meter at home  • Continue DM diet, QID Accuchecks/SSI  • Had to hold Lantus 3/7/23 and 3/8/23 since BS was 78 but did get last night 3/9  • No changes today     CKD III  • Baseline 1 2-1 4  • Creat was 2 5 on admission with a K+ level of 5 9  • Creat jumped to 1 66 on 3/6/23 and 3/7/23 gave NSS x 1 liter  • Renal following and gave more IVF noting that inc creat may be from increased ileostomy output  • Now back to baseline    Fluids stopped yesterday     Metabolic acidosis  • Was on 1/2 NS with sodium bicarb 75 meq started 3/8 = stopped 3/9/23     Anemia  • Transfused in December and January  • Hemoglobin was 7 1 on 2/26 = transfused x 1  • He has had no bleeding  • stable at 7 3     Situational depression  • Cymbalta      Left pleural effusion  • Thoracentesis 1/12 for 300ml  • Cyto from pleural fluid was negative for malignancy; cx = NG  • CT 2/22/23 = "Interval worsening of moderate right and small left pleural effusions"  • Has had no SOB  • Will watch     Hyponatremia  • Felt likely 2/2 SIADH in setting of malignancy  • On 2/27/23, added NACL 1 GM qd  • On 3/1/23, increased NACL tabs to BID, inc to 2Gm BID 3/3/23  • NACL tabs were stopped per renal 3/9/23 and changed to sodium bicarbonate tabs to tx metabolic acidosis     Malnutrition  • Had Magic cups BID ordered but didn't like them so stopped  • Prefers premier protein banana flavored shake --> wife brought in   One bottle has 30 Gm protein and only 4 carbs     Low grade temp/leukocytosis  • Was 100 3 on evening of 2/25 = afebrile since then  • WBC was up from 15 to 17 now 16  • ID following, watching  • stable        Discharge date: Laureen Jung    The above assessment and plan was reviewed and updated as determined by my evaluation of the patient on 3/10/2023      Labs:   Results from last 7 days   Lab Units 03/10/23  0647 03/09/23  0647   WBC Thousand/uL 16 09* 16 32*   HEMOGLOBIN g/dL 7 3* 7 4*   HEMATOCRIT % 23 4* 23 6*   PLATELETS Thousands/uL 350 357     Results from last 7 days   Lab Units 03/10/23  0647 03/09/23  0647   SODIUM mmol/L 130* 131*   POTASSIUM mmol/L 4 2 4 1   CHLORIDE mmol/L 102 105   CO2 mmol/L 18* 18*   BUN mg/dL 20 19   CREATININE mg/dL 1 38* 1 36*   CALCIUM mg/dL 8 2* 8 3             Results from last 7 days   Lab Units 03/10/23  1034 03/10/23  0631 03/09/23  2107   POC GLUCOSE mg/dl 93 112 108       Review of Scheduled Meds:  Current Facility-Administered Medications   Medication Dose Route Frequency Provider Last Rate   • acetaminophen  650 mg Oral Q4H PRN Mari Hu, DO     • aspirin  81 mg Oral Daily Mari Hu DO     • atorvastatin  40 mg Oral Daily Mari Hu DO     • calcium carbonate  500 mg Oral TID PRN GAURANG Douglass     • collagenase   Topical Daily Mari Hu DO     • DULoxetine  30 mg Oral Daily Mari Hu, DO     • enoxaparin  40 mg Subcutaneous Q24H [address] GAURANG Keith     • hydrOXYzine HCL  10 mg Oral HS Delayne Confluence, DO     • insulin glargine  5 Units Subcutaneous HS Delayne Confluence, DO     • insulin lispro  1-6 Units Subcutaneous TID AC Delayne Confluence, DO     • insulin lispro  1-6 Units Subcutaneous HS Delayne Confluence, DO     • insulin lispro  5 Units Subcutaneous TID With Meals GAURANG Russ     • melatonin  6 mg Oral HS PRN Delayne Confluence, DO     • methocarbamol  500 mg Oral BID PRN Delayne Confluence, DO     • midodrine  5 mg Oral TID AC GAURANG Evans     • ondansetron  4 mg Intravenous Q4H PRN Delayne Confluence, DO     • oxyCODONE  2 5 mg Oral Q4H PRN Delayne Confluence, DO     • oxyCODONE  5 mg Oral Q4H PRN Delayne Arena, DO     • pantoprazole  40 mg Oral BID AC Delayne Confluence, DO     • simethicone  80 mg Oral 4x Daily (with meals and at bedtime) Delayne Arena, DO     • sodium bicarbonate  1,300 mg Oral BID Ruben Manley MD     • tamsulosin  0 4 mg Oral Daily With [address] [place] [address], DO         Subjective/ HPI: Patient seen and examined  Patients overnight issues or events were reviewed with nursing or staff during rounds or morning huddle session  New or overnight issues include the following:     No overnight issues  Offers no complaints  +orthostatic in OT after lunch    ROS:   A 10 point ROS was performed; negative except as noted above         Imaging:     IR cholecystostomy tube check/change/reposition/reinsertion/upsize    (Results Pending)   MRI abdomen wo contrast and mrcp    (Results Pending)       *Labs /Radiology studies reviewed  *Medications reviewed and reconciled as needed  *Please refer to order section for additional ordered labs studies  *Case discussed with primary attending during morning huddle case rounds    Physical Examination:  Vitals:   Vitals:    03/10/23 0637 03/10/23 0849 03/10/23 0851 03/10/23 0945   BP: 137/75 123/78 118/70 111/72   BP Location: Left arm Right arm Right arm Right arm   Pulse: 90 (!) 110 (!) 115 (!) 113   Resp: 17      Temp: 98 3 °F (36 8 °C) TempSrc: Oral      SpO2: 93%      Weight:       Height:           General Appearance: no distress, conversive  HEENT: PERRLA, conjuctiva normal; oropharynx clear; mucous membranes moist   Neck:  Supple, normal ROM  Lungs: CTA, normal respiratory effort, no retractions, expiratory effort normal  CV: regular rate and rhythm; no rubs/murmurs/gallops, PMI normal   ABD: soft; ND/NT; +BS; ostomy watery stool  Per radha tube is capped  EXT: no edema  Skin: normal turgor, normal texture, no rashes  Psych: affect normal, mood normal  Neuro: AAO      The above physical exam was reviewed and updated as determined by my evaluation of the patient on 3/10/2023  Invasive Devices     Central Venous Catheter Line  Duration           Port A Cath 03/10/22 Right Chest 365 days          Drain  Duration           Ileostomy  days    Cholecystostomy Tube 14 days                   VTE Pharmacologic Prophylaxis: Enoxaparin  Code Status: Level 1 - Full Code  Current Length of Stay: 14 day(s)      Total time spent:  30 minutes with more than 50% spent counseling/coordinating care  Counseling includes discussion with patient re: progress  and discussion with patient of his/her current medical state/information  Coordination of patient's care was performed in conjunction with primary service  Time invested included review of patient's labs, vitals, and management of their comorbidities with continued monitoring  In addition, this patient was discussed with medical team including physician and advanced extenders  The care of the patient was extensively discussed and appropriate treatment plan was formulated unique for this patient  Medical decision making for the day was made by supervising physician unless otherwise noted in their attestation statement  ** Please Note:  voice to text software may have been used in the creation of this document   Although proof errors in transcription or interpretation are a potential of such software**

## 2023-03-10 NOTE — PROGRESS NOTES
OT Daily Treatment Note       03/10/23 9500   Pain Assessment   Pain Assessment Tool 0-10   Pain Score 5   Pain Location/Orientation Location: Rib Cage   Restrictions/Precautions   Precautions Fall Risk;Contact/isolation;Pain;Fluid restriction   Lifestyle   Autonomy "the dizziness is happening now"   Sit to Stand   Type of Assistance Needed Independent   Physical Assistance Level No physical assistance   Comment upon arrival, initial BP's were taken seated and in stance  Seated BP was 114/74  Upon stance, pt reported immediate dizziness and BP was recorded at 85/56  Nursing was notified and manual BP was taken and recorded at 80/48  CRNP Jenni and ANJELICA Almanzar were both notified and arrived to evaluate the pt  pt took ~10 minutes to recover from dizziness/light headedness  rest of session was completed while seated  Sit to Stand CARE Score 6   Cognition   Overall Cognitive Status [facility]   Arousal/Participation Alert; Cooperative   Attention Within functional limits   Orientation Level Oriented X4   Memory Within functional limits   Following Commands Follows all commands and directions without difficulty   Additional Activities   Additional Activities Other (Comment)   Additional Activities Comments pt requested to have assistance with ankle and calf stretching as pt was having inc tightness and wanted to be prepared for PT session later in PM  OT provided V/TC to complete BLE P/AROM with ankle flx/axt and knee ext  pt reported improvement with pain at end of session  Activity Tolerance   Activity Tolerance Treatment limited secondary to medical complications (Comment)   Assessment   Treatment Assessment Pt participated in skilled OT session with focus on functional transfer training, UE strengthening/ROM, compensatory technique education, continued education, energy conservation and activity engagement   See flowsheet for details of session and current functional status   Pt is limited by weakness, decreased ROM, impaired balance, decreased endurance, increased fall risk, pain, decreased activity tolerance, decreased safety awareness, impaired judgement, decreased strength and impaired psychosocial skills and requires skilled OT services to increase independence and safety with ADL completion in prep for DC home  Plan to continue ADL retraining, functional transfer training, UE strengthening/ROM, endurance training, Pt/caregiver education, equipment evaluation/education, compensatory technique education, continued education, energy conservation and activity engagement  to address barriers mentioned above  Prognosis Good   Problem List Decreased strength;Decreased endurance; Impaired balance;Decreased mobility   Barriers to Discharge Inaccessible home environment   Plan   Treatment/Interventions ADL retraining;Functional transfer training; Therapeutic exercise; Endurance training;Cognitive reorientation; Compensatory technique education   Progress Progressing toward goals   OT Therapy Minutes   OT Time In 1230   OT Time Out 1330   OT Total Time (minutes) 60   OT Mode of treatment - Individual (minutes) 60   OT Mode of treatment - Concurrent (minutes) 0   OT Mode of treatment - Group (minutes) 0   OT Mode of treatment - Co-treat (minutes) 0   OT Mode of Treatment - Total time(minutes) 60 minutes   OT Cumulative Minutes 930   Therapy Time missed   Time missed?  No

## 2023-03-10 NOTE — PROGRESS NOTES
[facility] PROGRESS NOTE   Kasey Dunn 61 y o  male MRN: [id number]  Unit/Bed#: -77 Encounter: [id number]  Reason for Consult: Hyponatremia, elevated creatinine    ASSESSMENT and PLAN:  1  Elevated creatinine  · Had ALEXY in [facility] admission with SCr of 2 52 on admission which resolved  · SCr noted to be 1 66 on 3/6/23 - Does not meet ALEXY criteria but SCr is elevated  Etiology suspected to be prerenal azotemia  · Resolved with IVF  · IVF stopped 3/9/23       2  Chronic kidney disease, stage III  · Baseline creatinine 1 2 to 1 4    · No prior renal follow up  · Stable renal function  SCr 1 38       3  Hyponatremia  · Na has been 128 to 130 the past 3 days but was 131 to 138 in [facility] admission  · Suspect volume mediated +/- SIADH  · Na 130 today  · Monitor with fluid restriction       4  Metabolic acidosis:  · Likely GI losses  · Increase sodium bicarbonate to 1300 mg TID      5  Orthostatic hypotension:  · Due to volume depletion from stool losses  · Continue Midodrine 5 mg TID  · Monitor orthostatics  6  Cholecystitis  · s/p cholecystostomy tube  · Currently capped  · GI to eval for hyperbilirubinemia     7  Metastatic colon cancer  · s/p transverse colectomy, ileostomy placement  · Currently with high output  If this persists, may consider discussing with GI re: med options       8  Recent intra-abdominal abscesses  9  Elevated alkaline phosphatase     DISPOSITION:  · Increase sodium bicarbonate to 1300 mg TID  · Agree with fluid restriction  · Continue Midodrine 5 mg TID  SUBJECTIVE / 24H INTERVAL HISTORY:  [place] lightheaded in PT yesterday  Reports better today  No CP or SOB  Stools have better consistency       OBJECTIVE:  Current Weight: Weight - Scale: 80 1 kg (176 lb 9 4 oz)  Vitals:    03/10/23 0637 03/10/23 0849 03/10/23 0851 03/10/23 0945   BP: 137/75 123/78 118/70 111/72   BP Location: Left arm Right arm Right arm Right arm   Pulse: 90 (!) 110 (!) 115 (!) 113   Resp: 17      Temp: 98 3 °F (36 8 °C)      TempSrc: Oral      SpO2: 93%      Weight:       Height:           Intake/Output Summary (Last 24 hours) at 3/10/2023 1224  Last data filed at 3/10/2023 1222  Gross per 24 hour   Intake 1690 ml   Output 1050 ml   Net 640 ml     General: conscious, cooperative, no distress  Skin: dry  Eyes: pink conjunctivae  ENT: moist mucous membranes  Respiratory: equal chest expansion, clear breath sounds  Cardiovascular: distinct heart sounds, normal rate, regular rhythm, no rub  Abdomen: soft, non tender, non distended, normal bowel sounds, (+) ileostomy  Extremities: no edema  Genitourinary: no rivero catheter  Neuro: awake, alert     Psych: appropriate affect    Medications:    Current Facility-Administered Medications:   •  acetaminophen (TYLENOL) tablet 650 mg, 650 mg, Oral, Q4H PRN, Jaqui Valdez DO, 650 mg at 03/01/23 2158  •  aspirin chewable tablet 81 mg, 81 mg, Oral, Daily, Jaqui Valdez DO, 81 mg at 03/10/23 0779  •  atorvastatin (LIPITOR) tablet 40 mg, 40 mg, Oral, Daily, Jaqui Valdez DO, 40 mg at 03/10/23 0825  •  calcium carbonate (TUMS) chewable tablet 500 mg, 500 mg, Oral, TID PRN, GAURANG Keith, 500 mg at 03/01/23 1715  •  collagenase (SANTYL) ointment, , Topical, Daily, Jaqui Valdez DO, Given at 03/09/23 1418  •  DULoxetine (CYMBALTA) delayed release capsule 30 mg, 30 mg, Oral, Daily, Jaqui Valdez DO, 30 mg at 03/10/23 0825  •  enoxaparin (LOVENOX) subcutaneous injection 40 mg, 40 mg, Subcutaneous, Q24H [facility], GAURANG Keith, 40 mg at 03/10/23 4112  •  hydrOXYzine HCL (ATARAX) tablet 10 mg, 10 mg, Oral, HS, Jaqui Valdez, DO, 10 mg at 03/09/23 0274  •  insulin glargine (LANTUS) subcutaneous injection 5 Units 0 05 mL, 5 Units, Subcutaneous, HS, Jaqui Valdez DO, 5 Units at 03/09/23 2123  •  insulin lispro (HumaLOG) 100 units/mL subcutaneous injection 1-6 Units, 1-6 Units, Subcutaneous, TID AC, 1 Units at 03/08/23 1124 **AND** Fingerstick Glucose (POCT), , , 4x Daily AC and at bedtime, Cherilynn Core, DO  •  insulin lispro (HumaLOG) 100 units/mL subcutaneous injection 1-6 Units, 1-6 Units, Subcutaneous, HS, Cherilynn Core, DO, 1 Units at 03/06/23 2147  •  insulin lispro (HumaLOG) 100 units/mL subcutaneous injection 5 Units, 5 Units, Subcutaneous, TID With Meals, GAURANG Geiger, 5 Units at 03/10/23 1147  •  melatonin tablet 6 mg, 6 mg, Oral, HS PRN, Cherilynn Core, DO, 6 mg at 02/28/23 2238  •  methocarbamol (ROBAXIN) tablet 500 mg, 500 mg, Oral, BID PRN, Cherilynn Core, DO, 500 mg at 03/03/23 3474  •  midodrine (PROAMATINE) tablet 5 mg, 5 mg, Oral, TID AC, Lo Gayle, CRNP, 5 mg at 03/10/23 1151  •  ondansetron [facility]) injection 4 mg, 4 mg, Intravenous, Q4H PRN, Yanelynn Core, DO, 4 mg at 03/10/23 8201  •  oxyCODONE (ROXICODONE) IR tablet 2 5 mg, 2 5 mg, Oral, Q4H PRN, Maryilynn Core, DO  •  oxyCODONE (ROXICODONE) IR tablet 5 mg, 5 mg, Oral, Q4H PRN, Maryilynn Core, DO, 5 mg at 03/10/23 5303  •  pantoprazole (PROTONIX) EC tablet 40 mg, 40 mg, Oral, BID AC, Maryilynn Core, DO, 40 mg at 03/10/23 1643  •  simethicone (MYLICON) chewable tablet 80 mg, 80 mg, Oral, 4x Daily (with meals and at bedtime), Maryilynn Core, DO, 80 mg at 03/10/23 1151  •  sodium bicarbonate tablet 1,300 mg, 1,300 mg, Oral, BID, Moon Beavers MD, 1,300 mg at 03/10/23 0825  •  tamsulosin [facility]) capsule 0 4 mg, 0 4 mg, Oral, Daily With Felipe Cancel, DO, 0 4 mg at 03/09/23 1625    Laboratory Results:  Results from last 7 days   Lab Units 03/10/23  0647 03/09/23  0647 03/08/23  0615 03/07/23  0549 03/06/23  0952 03/06/23  0633   WBC Thousand/uL 16 09* 16 32*  --  17 07* 15 85* 15 91*   HEMOGLOBIN g/dL 7 3* 7 4*  --  7 6* 7 7* 7 5*   HEMATOCRIT % 23 4* 23 6*  --  24 5* 24 8* 24 4*   PLATELETS Thousands/uL 350 357  --  351 338 352   POTASSIUM mmol/L 4 2 4 1 4 0 4 0 4 1 3 8   CHLORIDE mmol/L 102 105 105 105 102 102   CO2 mmol/L 18* 18* 17* 17* 18* 18*   BUN mg/dL 20 19 22 24 26* 26*   CREATININE mg/dL 1 38* 1 36* 1 54* 1 58* 1 66* 1 57*   CALCIUM mg/dL 8 2* 8 3 8 1* 8 4 8 1* 8 1*

## 2023-03-10 NOTE — CASE MANAGEMENT
Clinical update faxed via [facility] to rigoberto at [facility], [phone]  Awaiting determination

## 2023-03-10 NOTE — PROGRESS NOTES
03/10/23 0914   Pain Assessment   Pain Assessment Tool 0-10   Pain Score 3   Pain Location/Orientation Location: Rib Cage   Restrictions/Precautions   Precautions Fall Risk;Contact/isolation;Pain  (ilieostomy)   Weight Bearing Restrictions No   ROM Restrictions No   Braces or Orthoses   (b/l knee high TEDs)   Subjective   Subjective Pt napping in recliner  Agreeable to skilled PT tx   Sit to Stand   Type of Assistance Needed Independent   Comment RW   Sit to Stand CARE Score 6   Bed-Chair Transfer   Type of Assistance Needed Adaptive equipment; Independent   Comment RW   Chair/Bed-to-Chair Transfer CARE Score 6   Walk 10 Feet   Type of Assistance Needed Independent   Comment RW   Walk 10 Feet CARE Score 6   Ambulation   Primary Mode of Locomotion Prior to Admission Walk   Distance Walked (feet) 30 ft  (X3, 20'X3, 10'X2)   Assist Device Roller Walker;Cane   Gait Pattern Inconsistant Josefina; Slow Josefina;Decreased foot clearance; Forward Flexion   Limitations Noted In Balance; Endurance;Speed;Strength   Walk Assist Level Modified Independent   Findings SPC performed for gait speed assessment, cont to recommend RW for d/c home as pt Gregory with RW   Does the patient walk? 2  Yes   Therapeutic Interventions   Other gait speed with R HHA 0 30 m/s, gait speed with RW 0 46 m/s, gait speed with SPC 0 43 m/s  TUG with R HHA 21 66 seconds  From I E  pt slightly improved TUG score, score still indicates pt at increased risk for falls which is why RW is recommended for safety  Gait speed slightly improved with use of RW and SPC compared to i e  but still indicates pt would benefit from therapeutic intervention and currently indicates pt is limited community ambulator      Seated b/l hamstring and gastroc stretching as well as passive ROM to b/l ankles performed 10' total start of session   Assessment   Treatment Assessment 60 min skilled PT session focusing primarily on re performing outcome measures to compare from i e  Pt making slight improvements in outcome measure assessments however scores still indicate pt at increased risk for falls, therefore PT cont to recommend RW vs w/c in home pending pt's fatigue level as well as BPs  At this time cont with POC  PT Therapy Minutes   PT Time In 0930   PT Time Out 1030   PT Total Time (minutes) 60   PT Mode of treatment - Individual (minutes) 60   PT Mode of treatment - Concurrent (minutes) 0   PT Mode of treatment - Group (minutes) 0   PT Mode of treatment - Co-treat (minutes) 0   PT Mode of Treatment - Total time(minutes) 60 minutes   PT Cumulative Minutes 1210   Therapy Time missed   Time missed?  No

## 2023-03-10 NOTE — PLAN OF CARE
Problem: PAIN - ADULT  Goal: Verbalizes/displays adequate comfort level or baseline comfort level  Description: Interventions:  - Encourage patient to monitor pain and request assistance  - Assess pain using appropriate pain scale  - Administer analgesics based on type and severity of pain and evaluate response  - Implement non-pharmacological measures as appropriate and evaluate response  - Consider cultural and social influences on pain and pain management  - Notify physician/advanced practitioner if interventions unsuccessful or patient reports new pain  Outcome: Progressing     Problem: INFECTION - ADULT  Goal: Absence or prevention of progression during hospitalization  Description: INTERVENTIONS:  - Assess and monitor for signs and symptoms of infection  - Monitor lab/diagnostic results  - Monitor all insertion sites, i e  indwelling lines, tubes, and drains  - Monitor endotracheal if appropriate and nasal secretions for changes in amount and color  - [place] appropriate cooling/warming therapies per order  - Administer medications as ordered  - Instruct and encourage patient and family to use good hand hygiene technique  - Identify and instruct in appropriate isolation precautions for identified infection/condition  Outcome: Progressing  Goal: Absence of fever/infection during neutropenic period  Description: INTERVENTIONS:  - Monitor WBC    Outcome: Progressing     Problem: SAFETY ADULT  Goal: Patient will remain free of falls  Description: INTERVENTIONS:  - Educate patient/family on patient safety including physical limitations  - Instruct patient to call for assistance with activity   - Consult OT/PT to assist with strengthening/mobility   - Keep Call bell within reach  - Keep bed low and locked with side rails adjusted as appropriate  - Keep care items and personal belongings within reach  - Initiate and maintain comfort rounds  - Make Fall Risk Sign visible to staff  - Offer Toileting every 2 Hours, in advance of need      - Apply yellow socks and bracelet for high fall risk patients  - Consider moving patient to room near nurses station  Outcome: Progressing  Goal: Maintain or return to baseline ADL function  Description: INTERVENTIONS:  -  Assess patient's ability to carry out ADLs; assess patient's baseline for ADL function and identify physical deficits which impact ability to perform ADLs (bathing, care of mouth/teeth, toileting, grooming, dressing, etc )  - Assess/evaluate cause of self-care deficits   - Assess range of motion  - Assess patient's mobility; develop plan if impaired  - Assess patient's need for assistive devices and provide as appropriate  - Encourage maximum independence but intervene and supervise when necessary  - Involve family in performance of ADLs  - Assess for home care needs following discharge   - Consider OT consult to assist with ADL evaluation and planning for discharge  - Provide patient education as appropriate  Outcome: Progressing  Goal: Maintains/Returns to pre admission functional level  Description: INTERVENTIONS:  - Perform BMAT or MOVE assessment daily    - Set and communicate daily mobility goal to care team and patient/family/caregiver  - Collaborate with rehabilitation services on mobility goals if consulted  - Perform Range of Motion 2 times a day  - Reposition patient every 3 hours    - Dangle patient 3 times a day  - Stand patient 3 times a day  - Ambulate patient 3 times a day  - Out of bed to chair 3 times a day   - Out of bed for meals 3 times a day  - Out of bed for toileting  - Record patient progress and toleration of activity level   Outcome: Progressing     Problem: DISCHARGE PLANNING  Goal: Discharge to home or other facility with appropriate resources  Description: INTERVENTIONS:  - Identify barriers to discharge w/patient and caregiver  - Arrange for needed discharge resources and transportation as appropriate  - Identify discharge learning needs (meds, wound care, etc )  - Arrange for interpretive services to assist at discharge as needed  - Refer to Case Management Department for coordinating discharge planning if the patient needs post-hospital services based on physician/advanced practitioner order or complex needs related to functional status, cognitive ability, or social support system  Outcome: Progressing     Problem: Prexisting or High Potential for Compromised Skin Integrity  Goal: Skin integrity is maintained or improved  Description: INTERVENTIONS:  - Identify patients at risk for skin breakdown  - Assess and monitor skin integrity  - Assess and monitor nutrition and hydration status  - Monitor labs   - Assess for incontinence   - Turn and reposition patient  - Assist with mobility/ambulation  - Relieve pressure over bony prominences  - Avoid friction and shearing  - Provide appropriate hygiene as needed including keeping skin clean and dry  - Evaluate need for skin moisturizer/barrier cream  - Collaborate with interdisciplinary team   - Patient/family teaching  - Consider wound care consult   Outcome: Progressing     Problem: Nutrition/Hydration-ADULT  Goal: Nutrient/Hydration intake appropriate for improving, restoring or maintaining nutritional needs  Description: Monitor and assess patient's nutrition/hydration status for malnutrition  Collaborate with interdisciplinary team and initiate plan and interventions as ordered  Monitor patient's weight and dietary intake as ordered or per policy  Utilize nutrition screening tool and intervene as necessary  Determine patient's food preferences and provide high-protein, high-caloric foods as appropriate       INTERVENTIONS:  - Monitor oral intake, urinary output, labs, and treatment plans  - Assess nutrition and hydration status and recommend course of action  - Evaluate amount of meals eaten  - Assist patient with eating if necessary   - Allow adequate time for meals  - Recommend/ encourage appropriate diets, oral nutritional supplements, and vitamin/mineral supplements  - Order, calculate, and assess calorie counts as needed  - Recommend, monitor, and adjust tube feedings and TPN/PPN based on assessed needs  - Assess need for intravenous fluids  - Provide specific nutrition/hydration education as appropriate  - Include patient/family/caregiver in decisions related to nutrition  Outcome: Progressing     Problem: MOBILITY - ADULT  Goal: Maintain or return to baseline ADL function  Description: INTERVENTIONS:  -  Assess patient's ability to carry out ADLs; assess patient's baseline for ADL function and identify physical deficits which impact ability to perform ADLs (bathing, care of mouth/teeth, toileting, grooming, dressing, etc )  - Assess/evaluate cause of self-care deficits   - Assess range of motion  - Assess patient's mobility; develop plan if impaired  - Assess patient's need for assistive devices and provide as appropriate  - Encourage maximum independence but intervene and supervise when necessary  - Involve family in performance of ADLs  - Assess for home care needs following discharge   - Consider OT consult to assist with ADL evaluation and planning for discharge  - Provide patient education as appropriate  Outcome: Progressing  Goal: Maintains/Returns to pre admission functional level  Description: INTERVENTIONS:  - Perform BMAT or MOVE assessment daily    - Set and communicate daily mobility goal to care team and patient/family/caregiver  - Collaborate with rehabilitation services on mobility goals if consulted  - Perform Range of Motion 3 times a day  - Reposition patient every 2 hours    - Dangle patient 3 times a day  - Stand patient 3 times a day  - Ambulate patient 3 times a day  - Out of bed to chair 3 times a day   - Out of bed for meals 3 times a day  - Out of bed for toileting  - Record patient progress and toleration of activity level   Outcome: Progressing

## 2023-03-10 NOTE — PROGRESS NOTES
03/10/23 1400   Pain Assessment   Pain Assessment Tool 0-10   Restrictions/Precautions   Precautions Fall Risk;Contact/isolation;Fluid restriction   Subjective   Subjective pt agreeable to perform skilled PT   Sit to Stand   Type of Assistance Needed Independent; Adaptive equipment   Comment RW   Sit to Stand CARE Score 6   Bed-Chair Transfer   Type of Assistance Needed Independent; Adaptive equipment   Comment RW with IRP   Chair/Bed-to-Chair Transfer CARE Score 6   Transfer Bed/Chair/Wheelchair   Adaptive Equipment Roller Walker   Walk 10 Feet   Type of Assistance Needed Independent; Adaptive equipment   Comment RW   Walk 10 Feet CARE Score 6   Walk 50 Feet with Two Turns   Type of Assistance Needed Set-up / clean-up; Adaptive equipment   Comment RW   Walk 50 Feet with Two Turns CARE Score 5   Ambulation   Primary Mode of Locomotion Prior to Admission Walk   Distance Walked (feet) 100 ft  (x3)   Assist Device Roller Walker   Does the patient walk? 2  Yes   Assessment   Treatment Assessment Pt perform skilled PT walking with  plus at times and needs rest breaks to sit in [facility] follow d/t fatigue at times   Pt will cont to need skilled PT to meet DC goals , pt back in room in supine in bed with all needs in reach   Barriers to Discharge Inaccessible home environment   Plan   Progress Progressing toward goals   Recommendation   PT Discharge Recommendation Home with home health rehabilitation   PT Therapy Minutes   PT Time In 1400   PT Time Out 1430   PT Total Time (minutes) 30   PT Mode of treatment - Individual (minutes) 30   PT Mode of treatment - Concurrent (minutes) 0   PT Mode of treatment - Group (minutes) 0   PT Mode of treatment - Co-treat (minutes) 0   PT Mode of Treatment - Total time(minutes) 30 minutes   PT Cumulative Minutes 1240   Therapy Time missed   Time missed?  No

## 2023-03-10 NOTE — PROGRESS NOTES
03/10/23 0830   Pain Assessment   Pain Assessment Tool 0-10   Pain Score No Pain   Restrictions/Precautions   Precautions Fall Risk;Contact/isolation;Pain  (ilieostomy)   Weight Bearing Restrictions No   ROM Restrictions No   Braces or Orthoses   (b/l knee high TEDS)   Subjective   Subjective Pt states he didn't sleep well but agreeable to PT tx  No new complaints to report   Lying to Sitting on Side of Bed   Type of Assistance Needed Independent   Comment with HOB elevated, use of bedrails as pt has hospital bed at home   Lying to Sitting on Side of Bed CARE Score 6   Sit to Stand   Type of Assistance Needed Independent   Comment RW   Sit to Stand CARE Score 6   Bed-Chair Transfer   Type of Assistance Needed Independent   Comment RW   Chair/Bed-to-Chair Transfer CARE Score 6   Ambulation   Findings pt reporting 5/10 dizziness upon stance therefore did not perform ambulation this session, plan to perform next session  - ortho BPs   Therapeutic Interventions   Other pt assisted with doffing pajama bottoms and with donning pants for today  Performed in stance with use of RW  Pt indepedent for pulling down pants to knees, PT then assisted while pt sat EOB  PT threaded LEs through pants, pt stood and then able to pull up pants   Other Comments   Comments BPs assessed sitting to standing, see vitals for details  - ortho BPs   Assessment   Treatment Assessment 30 min skilled PT session focusing on assisting pt OOB into recliner and standing to change bottoms  Pt c/o no pain at start of session but does report he did not sleep well  Due to fatigue requires much increased time to perform all transfers and mobilities  Despite this pt able to perform mobilities at [place] with use of RW  Pt with - ortho BPs this session despite c/o dizziness initially in stance  Cont with POC at this time with focus on improving pt's endurance, overall strength, and balance to reduce caregiver burden and risk for falls   Plan to reasses outcome measures next session performed on i e  Anticipating potential d/c for early next week pending medical clearance  Family/Caregiver Present no   Problem List Decreased strength;Decreased endurance; Impaired balance;Decreased mobility   Barriers to Discharge Inaccessible home environment   PT Barriers   Functional Limitation Stair negotiation;Car transfers   Plan   Treatment/Interventions Functional transfer training;LE strengthening/ROM; Elevations; Therapeutic exercise; Endurance training;Bed mobility;Gait training;Patient/family training   Progress Progressing toward goals   Recommendation   PT Discharge Recommendation Home with home health rehabilitation   Equipment Recommended Wheelchair;Walker   PT Therapy Minutes   PT Time In 0830   PT Time Out 0900   PT Total Time (minutes) 30   PT Mode of treatment - Individual (minutes) 30   PT Mode of treatment - Concurrent (minutes) 0   PT Mode of treatment - Group (minutes) 0   PT Mode of treatment - Co-treat (minutes) 0   PT Mode of Treatment - Total time(minutes) 30 minutes   PT Cumulative Minutes 1150   Therapy Time missed   Time missed?  No

## 2023-03-11 LAB
ABO GROUP BLD: NORMAL
ALBUMIN SERPL BCP-MCNC: 1.1 G/DL (ref 3.5–5)
ALP SERPL-CCNC: 947 U/L (ref 46–116)
ALT SERPL W P-5'-P-CCNC: 13 U/L (ref 12–78)
ANION GAP SERPL CALCULATED.3IONS-SCNC: 7 MMOL/L (ref 4–13)
AST SERPL W P-5'-P-CCNC: 70 U/L (ref 5–45)
BASOPHILS # BLD AUTO: 0.06 THOUSANDS/ÂΜL (ref 0–0.1)
BASOPHILS NFR BLD AUTO: 0 % (ref 0–1)
BILIRUB SERPL-MCNC: 1.65 MG/DL (ref 0.2–1)
BLD GP AB SCN SERPL QL: NEGATIVE
BUN SERPL-MCNC: 20 MG/DL (ref 5–25)
CALCIUM ALBUM COR SERPL-MCNC: 10.3 MG/DL (ref 8.3–10.1)
CALCIUM SERPL-MCNC: 8 MG/DL (ref 8.3–10.1)
CHLORIDE SERPL-SCNC: 103 MMOL/L (ref 96–108)
CO2 SERPL-SCNC: 20 MMOL/L (ref 21–32)
CREAT SERPL-MCNC: 1.53 MG/DL (ref 0.6–1.3)
EOSINOPHIL # BLD AUTO: 0 THOUSAND/ÂΜL (ref 0–0.61)
EOSINOPHIL NFR BLD AUTO: 0 % (ref 0–6)
ERYTHROCYTE [DISTWIDTH] IN BLOOD BY AUTOMATED COUNT: 17.3 % (ref 11.6–15.1)
GFR SERPL CREATININE-BSD FRML MDRD: 49 ML/MIN/1.73SQ M
GLUCOSE SERPL-MCNC: 113 MG/DL (ref 65–140)
GLUCOSE SERPL-MCNC: 136 MG/DL (ref 65–140)
GLUCOSE SERPL-MCNC: 136 MG/DL (ref 65–140)
GLUCOSE SERPL-MCNC: 151 MG/DL (ref 65–140)
GLUCOSE SERPL-MCNC: 167 MG/DL (ref 65–140)
HCT VFR BLD AUTO: 24 % (ref 36.5–49.3)
HGB BLD-MCNC: 7.1 G/DL (ref 12–17)
IMM GRANULOCYTES # BLD AUTO: 0.26 THOUSAND/UL (ref 0–0.2)
IMM GRANULOCYTES NFR BLD AUTO: 2 % (ref 0–2)
INR PPP: 1.43 (ref 0.84–1.19)
LYMPHOCYTES # BLD AUTO: 1.51 THOUSANDS/ÂΜL (ref 0.6–4.47)
LYMPHOCYTES NFR BLD AUTO: 9 % (ref 14–44)
MCH RBC QN AUTO: 25.4 PG (ref 26.8–34.3)
MCHC RBC AUTO-ENTMCNC: 29.6 G/DL (ref 31.4–37.4)
MCV RBC AUTO: 86 FL (ref 82–98)
MONOCYTES # BLD AUTO: 1.74 THOUSAND/ÂΜL (ref 0.17–1.22)
MONOCYTES NFR BLD AUTO: 10 % (ref 4–12)
NEUTROPHILS # BLD AUTO: 13.34 THOUSANDS/ÂΜL (ref 1.85–7.62)
NEUTS SEG NFR BLD AUTO: 79 % (ref 43–75)
NRBC BLD AUTO-RTO: 0 /100 WBCS
PLATELET # BLD AUTO: 345 THOUSANDS/UL (ref 149–390)
PMV BLD AUTO: 10.2 FL (ref 8.9–12.7)
POTASSIUM SERPL-SCNC: 4.1 MMOL/L (ref 3.5–5.3)
PROT SERPL-MCNC: 6.5 G/DL (ref 6.4–8.4)
PROTHROMBIN TIME: 17.7 SECONDS (ref 11.6–14.5)
RBC # BLD AUTO: 2.8 MILLION/UL (ref 3.88–5.62)
RH BLD: POSITIVE
SODIUM SERPL-SCNC: 130 MMOL/L (ref 135–147)
SPECIMEN EXPIRATION DATE: NORMAL
WBC # BLD AUTO: 16.91 THOUSAND/UL (ref 4.31–10.16)

## 2023-03-11 PROCEDURE — 30233N1 TRANSFUSION OF NONAUTOLOGOUS RED BLOOD CELLS INTO PERIPHERAL VEIN, PERCUTANEOUS APPROACH: ICD-10-PCS | Performed by: PHYSICAL MEDICINE & REHABILITATION

## 2023-03-11 RX ORDER — SODIUM CHLORIDE 9 MG/ML
75 INJECTION, SOLUTION INTRAVENOUS CONTINUOUS
Status: DISCONTINUED | OUTPATIENT
Start: 2023-03-11 | End: 2023-03-11

## 2023-03-11 RX ADMIN — SODIUM BICARBONATE 1300 MG: 650 TABLET ORAL at 12:18

## 2023-03-11 RX ADMIN — SIMETHICONE 80 MG: 80 TABLET, CHEWABLE ORAL at 09:08

## 2023-03-11 RX ADMIN — SIMETHICONE 80 MG: 80 TABLET, CHEWABLE ORAL at 12:10

## 2023-03-11 RX ADMIN — INSULIN LISPRO 5 UNITS: 100 INJECTION, SOLUTION INTRAVENOUS; SUBCUTANEOUS at 07:42

## 2023-03-11 RX ADMIN — ASPIRIN 81 MG CHEWABLE TABLET 81 MG: 81 TABLET CHEWABLE at 09:18

## 2023-03-11 RX ADMIN — ATORVASTATIN CALCIUM 40 MG: 40 TABLET, FILM COATED ORAL at 09:18

## 2023-03-11 RX ADMIN — SIMETHICONE 80 MG: 80 TABLET, CHEWABLE ORAL at 17:23

## 2023-03-11 RX ADMIN — OXYCODONE HYDROCHLORIDE 5 MG: 5 TABLET ORAL at 13:02

## 2023-03-11 RX ADMIN — MIDODRINE HYDROCHLORIDE 5 MG: 5 TABLET ORAL at 07:41

## 2023-03-11 RX ADMIN — INSULIN LISPRO 1 UNITS: 100 INJECTION, SOLUTION INTRAVENOUS; SUBCUTANEOUS at 12:02

## 2023-03-11 RX ADMIN — SIMETHICONE 80 MG: 80 TABLET, CHEWABLE ORAL at 21:44

## 2023-03-11 RX ADMIN — PANTOPRAZOLE SODIUM 40 MG: 40 TABLET, DELAYED RELEASE ORAL at 15:34

## 2023-03-11 RX ADMIN — HYDROXYZINE HYDROCHLORIDE 10 MG: 10 TABLET ORAL at 21:44

## 2023-03-11 RX ADMIN — ENOXAPARIN SODIUM 40 MG: 40 INJECTION SUBCUTANEOUS at 09:18

## 2023-03-11 RX ADMIN — PANTOPRAZOLE SODIUM 40 MG: 40 TABLET, DELAYED RELEASE ORAL at 07:41

## 2023-03-11 RX ADMIN — MIDODRINE HYDROCHLORIDE 5 MG: 5 TABLET ORAL at 12:17

## 2023-03-11 RX ADMIN — COLLAGENASE SANTYL: 250 OINTMENT TOPICAL at 12:30

## 2023-03-11 RX ADMIN — DULOXETINE HYDROCHLORIDE 30 MG: 30 CAPSULE, DELAYED RELEASE ORAL at 09:18

## 2023-03-11 RX ADMIN — OXYCODONE HYDROCHLORIDE 5 MG: 5 TABLET ORAL at 21:44

## 2023-03-11 RX ADMIN — SODIUM BICARBONATE 1300 MG: 650 TABLET ORAL at 17:23

## 2023-03-11 RX ADMIN — MIDODRINE HYDROCHLORIDE 5 MG: 5 TABLET ORAL at 15:34

## 2023-03-11 RX ADMIN — ONDANSETRON 4 MG: 2 INJECTION INTRAMUSCULAR; INTRAVENOUS at 12:06

## 2023-03-11 RX ADMIN — ONDANSETRON 4 MG: 2 INJECTION INTRAMUSCULAR; INTRAVENOUS at 17:23

## 2023-03-11 RX ADMIN — TAMSULOSIN HYDROCHLORIDE 0.4 MG: 0.4 CAPSULE ORAL at 17:23

## 2023-03-11 NOTE — PROGRESS NOTES
Physical Medicine and Rehabilitation Progress Note  Amelia Sizer 61 y o  male MRN: [id number]  Unit/Bed#: [facility] 207-15 Encounter: [id number]    Chief Complaint:  Feeling more tired, RUQ discomfort  Interval History: No acute events overnight  Denies any new CP, SOB, fevers, chills, N/V  Continues with mild RUQ pain and discomfort which is tolerable  Last BM 3/10  Further his cath is not returning blood, so infusion nurse called to check cath  IM ordered cath brigida  Labs remain pending today  HR has been controlled  BP is stable  Assessment/Plan - Continue plan of care as per primary attending, unless otherwise stated below:      · SIRS vs  Sepsis:  Clinically stable appearing and non-toxic  LFTs have been stable/elevated  MRCP negative for choledocholithiasis, but did showed enlarged abscess cavity 5 1cm  I reached out to GI - nothing to do from their standpoint  Discussed also with ID on call and Surg Onc, who recommended eval by IR  Consult placed  May require drainage  ID would only start antibiotics after it is drained  Monitor for now off antibiotics  · Surg Onc also mentioned getting enlarging thoracentesis drained and labs sent off if ID felt this was warranted  · Reviewed plan with patient  · 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: Perc radha drain  Repeat tube check on 3/7 without evidence of fistula  Tube is capped  · Perihepatic Abscess: MRCP showed enlarged abscess cavity since drain removed  ID aware, Surg Onc aware  Consulted IR for evaluation  · Metastatic Colon Adenocarcinoma s/p resection, chemo, recent hepatic resection and ablation, and transverse colon resection with ileostomy: Has mets to liver, possibly lung  Chronically elevated LFTs  Repeat CMP pending  · Bilateral R > K pleural effusions  R increasing in size: Asymptomatic at this time  Surg Onc brought up possibly draining pleural effusion for diagnostic reasons   Will add to IR consult for this and labs    · Hyponatremia 2/2 volume status (per Nephro) +/- SIADH: On fluid restriction  F/u today's labsl  · ALEXY on CKD3 (baseline 1 2-1 4): Labs for today pending  Nephro following  Felt to be likely 2/2 prerenal azotemia with improvement with IVF stopped on 3/9  · Anemia: Recheck CBC today  Has consent in chart 3/4  Hgb down to 7 1 today  Symptomatic, giving 1unit May need a new type and screen  Last one was on 3/6    · DVT PPx: Lovenox, may need to be held prior to IR procedure  Already given today  · Pain: Continue current regimen  · Bowel/Bladder Ileostomy output stable  Voiding and continent  Total time spent:  50 minutes, with more than 50% spent counseling/coordinating care  Counseling includes discussion with patient re: progress in therapies, functional issues observed by therapy staff, and discussion with patient his/her current medical state/wellbeing  Coordination of patient's care was performed in conjunction with Internal Medicine service to monitor patient's labs, vitals, and management of their comorbidities  Aleksandra Pittman MD  Physical Medicine and Rehabilitation      Review of Systems: A 10 point review of systems was negative except for what is noted in the HPI          Current Facility-Administered Medications:   •  acetaminophen (TYLENOL) tablet 650 mg, 650 mg, Oral, Q4H PRN, Forrestine Nettle, DO, 650 mg at 03/01/23 2158  •  aspirin chewable tablet 81 mg, 81 mg, Oral, Daily, Forrestine Nettle, DO, 81 mg at 03/10/23 4071  •  atorvastatin (LIPITOR) tablet 40 mg, 40 mg, Oral, Daily, Forrestine Nettle, DO, 40 mg at 03/10/23 0825  •  calcium carbonate (TUMS) chewable tablet 500 mg, 500 mg, Oral, TID PRN, GAURANG Keith, 500 mg at 03/01/23 1715  •  collagenase (SANTYL) ointment, , Topical, Daily, Forrestine Nettle, DO, Given at 03/10/23 1542  •  DULoxetine (CYMBALTA) delayed release capsule 30 mg, 30 mg, Oral, Daily, Forrestine Nettle, DO, 30 mg at 03/10/23 0825  •  enoxaparin (LOVENOX) subcutaneous injection 40 mg, 40 mg, Subcutaneous, Q24H [facility], Jenelle Martin, RANNP, 40 mg at 03/10/23 5819  •  hydrOXYzine HCL (ATARAX) tablet 10 mg, 10 mg, Oral, HS, [facility], DO, 10 mg at 03/10/23 2155  •  insulin glargine (LANTUS) subcutaneous injection 5 Units 0 05 mL, 5 Units, Subcutaneous, HS, [facility], DO, 5 Units at 03/10/23 2155  •  insulin lispro (HumaLOG) 100 units/mL subcutaneous injection 1-6 Units, 1-6 Units, Subcutaneous, TID AC, 1 Units at 03/08/23 1124 **AND** Fingerstick Glucose (POCT), , , 4x Daily AC and at bedtime, [facility], DO  •  insulin lispro (HumaLOG) 100 units/mL subcutaneous injection 1-6 Units, 1-6 Units, Subcutaneous, HS, [facility], DO, 1 Units at 03/06/23 2147  •  insulin lispro (HumaLOG) 100 units/mL subcutaneous injection 5 Units, 5 Units, Subcutaneous, TID With Meals, Martadiamanet Feldmanr, RANNP, 5 Units at 03/11/23 4581  •  melatonin tablet 6 mg, 6 mg, Oral, HS PRN, [facility], DO, 6 mg at 02/28/23 2238  •  methocarbamol (ROBAXIN) tablet 500 mg, 500 mg, Oral, BID PRN, [facility], DO, 500 mg at 03/03/23 1759  •  midodrine (PROAMATINE) tablet 5 mg, 5 mg, Oral, TID AC, Charley Arreola, CRNP, 5 mg at 03/11/23 0741  •  [facility]ron [facility] injection 4 mg, 4 mg, Intravenous, Q4H PRN, [facility], DO, 4 mg at 03/10/23 1815  •  oxyCODONE (ROXICODONE) IR tablet 2 5 mg, 2 5 mg, Oral, Q4H PRN, [facility], DO  •  oxyCODONE (ROXICODONE) IR tablet 5 mg, 5 mg, Oral, Q4H PRN, [facility], DO, 5 mg at 03/10/23 2155  •  pantoprazole (PROTONIX) EC tablet 40 mg, 40 mg, Oral, BID AC, [facility], DO, 40 mg at 03/11/23 0741  •  simethicone (MYLICON) chewable tablet 80 mg, 80 mg, Oral, 4x Daily (with meals and at bedtime), [facility], DO, 80 mg at 03/10/23 2155  •  sodium bicarbonate tablet 1,300 mg, 1,300 mg, Oral, TID after meals, Zeyad Todd MD, 1,300 mg at 03/10/23 1814  •  tamsulosin (FLOMAX) capsule 0 4 mg, 0 4 mg, Oral, Daily With Dinner, [facility], DO, 0 4 mg at 03/10/23 1551    Physical Exam:  Temp:  [97 9 °F (36 6 °C)-98 2 °F (36 8 °C)] 98 2 °F (36 8 °C)  HR:  [] 85  Resp:  [16-18] 16  BP: ()/(48-80) 132/80  SpO2:  [96 %-98 %] 96 %    Gen: No acute distress, appears tired but non-toxic  HEENT: Moist mucus membranes, Normocephalic/Atraumatic  Cardiovascular: Regular rate, rhythm, S1/S2  Distal pulses palpable  Heme/Extr: No edema  Pulmonary: Non-labored breathing  Lungs Clear, diminished  : No rivero  GI: Soft, non-tender, non-distended  BS+  Integumentary: Skin is warm, dry  Neuro: AAOx3,  Speech is intact  Appropriate to questioning  Psych: Normal mood and affect  Laboratory:  Labs reviewed  Results from last 7 days   Lab Units 03/10/23  0647 03/09/23  0647 03/07/23  0549   HEMOGLOBIN g/dL 7 3* 7 4* 7 6*   HEMATOCRIT % 23 4* 23 6* 24 5*   WBC Thousand/uL 16 09* 16 32* 17 07*     Results from last 7 days   Lab Units 03/10/23  0647 03/09/23  0647 03/08/23  0615   BUN mg/dL 20 19 22   SODIUM mmol/L 130* 131* 129*   POTASSIUM mmol/L 4 2 4 1 4 0   CHLORIDE mmol/L 102 105 105   CREATININE mg/dL 1 38* 1 36* 1 54*   AST U/L 86* 87* 90*   ALT U/L 15 16 14            Diagnostic Studies: Reviewed   MRI abdomen wo contrast and mrcp   Final Result by Deborah Underwood MD (03/10 4391)      No choledocholithiasis  Increased size of T2 hyperintense focus at the left hepatic lobe now measuring 5 1 cm consistent with enlarging abscess cavity following recent removal of drainage catheter  Innumerable hepatic metastatic lesions again seen  Increase in size of large right pleural effusion  Small left pleural effusion                  Workstation performed: [id number]         IR cholecystostomy tube check/change/reposition/reinsertion/upsize   Final Result by Agnieszka Sauceda MD (03/10 3080)   Impression: Resolution of coloenteric fistula  Patent cystic and common bile duct, filling of contrast into the duodenum briskly    Tube capped, will return in 2 weeks for possible tube removal depending on input from surgeons  Continue to flush with 10    mL normal saline daily  Signed, performed, and dictated by GAURANG Katz under the supervision of Ricarda Nuñez  Workstation performed: [id number]               ** Please Note: Fluency Direct voice to text software may have been used in the creation of this document   **

## 2023-03-11 NOTE — PLAN OF CARE
Problem: PAIN - ADULT  Goal: Verbalizes/displays adequate comfort level or baseline comfort level  Description: Interventions:  - Encourage patient to monitor pain and request assistance  - Assess pain using appropriate pain scale  - Administer analgesics based on type and severity of pain and evaluate response  - Implement non-pharmacological measures as appropriate and evaluate response  - Consider cultural and social influences on pain and pain management  - Notify physician/advanced practitioner if interventions unsuccessful or patient reports new pain  Outcome: Progressing     Problem: INFECTION - ADULT  Goal: Absence or prevention of progression during hospitalization  Description: INTERVENTIONS:  - Assess and monitor for signs and symptoms of infection  - Monitor lab/diagnostic results  - Monitor all insertion sites, i e  indwelling lines, tubes, and drains  - Monitor endotracheal if appropriate and nasal secretions for changes in amount and color  - [place] appropriate cooling/warming therapies per order  - Administer medications as ordered  - Instruct and encourage patient and family to use good hand hygiene technique  - Identify and instruct in appropriate isolation precautions for identified infection/condition  Outcome: Progressing  Goal: Absence of fever/infection during neutropenic period  Description: INTERVENTIONS:  - Monitor WBC    Outcome: Progressing     Problem: SAFETY ADULT  Goal: Patient will remain free of falls  Description: INTERVENTIONS:  - Educate patient/family on patient safety including physical limitations  - Instruct patient to call for assistance with activity   - Consult OT/PT to assist with strengthening/mobility   - Keep Call bell within reach  - Keep bed low and locked with side rails adjusted as appropriate  - Keep care items and personal belongings within reach  - Initiate and maintain comfort rounds  - Make Fall Risk Sign visible to staff  - Offer Toileting every 2 Hours, in advance of need  - Initiate/Maintain bed alarm  - Obtain necessary fall risk management equipment: bed   - Apply yellow socks and bracelet for high fall risk patients  - Consider moving patient to room near nurses station  Outcome: Progressing  Goal: Maintain or return to baseline ADL function  Description: INTERVENTIONS:  -  Assess patient's ability to carry out ADLs; assess patient's baseline for ADL function and identify physical deficits which impact ability to perform ADLs (bathing, care of mouth/teeth, toileting, grooming, dressing, etc )  - Assess/evaluate cause of self-care deficits   - Assess range of motion  - Assess patient's mobility; develop plan if impaired  - Assess patient's need for assistive devices and provide as appropriate  - Encourage maximum independence but intervene and supervise when necessary  - Involve family in performance of ADLs  - Assess for home care needs following discharge   - Consider OT consult to assist with ADL evaluation and planning for discharge  - Provide patient education as appropriate  Outcome: Progressing  Goal: Maintains/Returns to pre admission functional level  Description: INTERVENTIONS:  - Perform BMAT or MOVE assessment daily    - Set and communicate daily mobility goal to care team and patient/family/caregiver  - Collaborate with rehabilitation services on mobility goals if consulted  - Perform Range of Motion 3 times a day  - Reposition patient every 2 hours    - Dangle patient 3 times a day  - Stand patient 3 times a day  - Ambulate patient 3 times a day  - Out of bed to chair 3 times a day   - Out of bed for meals 3 times a day  - Out of bed for toileting  - Record patient progress and toleration of activity level   Outcome: Progressing     Problem: DISCHARGE PLANNING  Goal: Discharge to home or other facility with appropriate resources  Description: INTERVENTIONS:  - Identify barriers to discharge w/patient and caregiver  - Arrange for needed discharge resources and transportation as appropriate  - Identify discharge learning needs (meds, wound care, etc )  - Arrange for interpretive services to assist at discharge as needed  - Refer to Case Management Department for coordinating discharge planning if the patient needs post-hospital services based on physician/advanced practitioner order or complex needs related to functional status, cognitive ability, or social support system  Outcome: Progressing     Problem: Prexisting or High Potential for Compromised Skin Integrity  Goal: Skin integrity is maintained or improved  Description: INTERVENTIONS:  - Identify patients at risk for skin breakdown  - Assess and monitor skin integrity  - Assess and monitor nutrition and hydration status  - Monitor labs   - Assess for incontinence   - Turn and reposition patient  - Assist with mobility/ambulation  - Relieve pressure over bony prominences  - Avoid friction and shearing  - Provide appropriate hygiene as needed including keeping skin clean and dry  - Evaluate need for skin moisturizer/barrier cream  - Collaborate with interdisciplinary team   - Patient/family teaching  - Consider wound care consult   Outcome: Progressing     Problem: Nutrition/Hydration-ADULT  Goal: Nutrient/Hydration intake appropriate for improving, restoring or maintaining nutritional needs  Description: Monitor and assess patient's nutrition/hydration status for malnutrition  Collaborate with interdisciplinary team and initiate plan and interventions as ordered  Monitor patient's weight and dietary intake as ordered or per policy  Utilize nutrition screening tool and intervene as necessary  Determine patient's food preferences and provide high-protein, high-caloric foods as appropriate       INTERVENTIONS:  - Monitor oral intake, urinary output, labs, and treatment plans  - Assess nutrition and hydration status and recommend course of action  - Evaluate amount of meals eaten  - Assist patient with eating if necessary   - Allow adequate time for meals  - Recommend/ encourage appropriate diets, oral nutritional supplements, and vitamin/mineral supplements  - Order, calculate, and assess calorie counts as needed  - Recommend, monitor, and adjust tube feedings and TPN/PPN based on assessed needs  - Assess need for intravenous fluids  - Provide specific nutrition/hydration education as appropriate  - Include patient/family/caregiver in decisions related to nutrition  Outcome: Progressing     Problem: MOBILITY - ADULT  Goal: Maintain or return to baseline ADL function  Description: INTERVENTIONS:  -  Assess patient's ability to carry out ADLs; assess patient's baseline for ADL function and identify physical deficits which impact ability to perform ADLs (bathing, care of mouth/teeth, toileting, grooming, dressing, etc )  - Assess/evaluate cause of self-care deficits   - Assess range of motion  - Assess patient's mobility; develop plan if impaired  - Assess patient's need for assistive devices and provide as appropriate  - Encourage maximum independence but intervene and supervise when necessary  - Involve family in performance of ADLs  - Assess for home care needs following discharge   - Consider OT consult to assist with ADL evaluation and planning for discharge  - Provide patient education as appropriate  Outcome: Progressing  Goal: Maintains/Returns to pre admission functional level  Description: INTERVENTIONS:  - Perform BMAT or MOVE assessment daily    - Set and communicate daily mobility goal to care team and patient/family/caregiver  - Collaborate with rehabilitation services on mobility goals if consulted  - Perform Range of Motion 3 times a day  - Reposition patient every 2 hours    - Dangle patient 3 times a day  - Stand patient 3 times a day  - Ambulate patient 3 times a day  - Out of bed to chair 3 times a day   - Out of bed for meals 3 times a day  - Out of bed for toileting  - Record patient progress and toleration of activity level   Outcome: Progressing

## 2023-03-11 NOTE — PROGRESS NOTES
PICC team called by primary RN Nya Burk that patient is due for port re-access  Also, she is unable to obtain blood return from port for today's ordered labs  Port flushed per protocol  I was able to aspirate blood without difficulty after repositioning the patient and saline flushes with cap change  TPA was ordered but is no longer needed  New port needle placed without incident and flushed per protocol  Port is now flushing freely with positive blood return

## 2023-03-11 NOTE — PROGRESS NOTES
03/11/23 1440   Pain Assessment   Pain Assessment Tool 0-10   Pain Score 4   Pain Location/Orientation Location: Rib Cage   Restrictions/Precautions   Precautions Fall Risk;Contact/isolation;Fluid restriction   Braces or Orthoses   (b/l knee high TEDs)   Subjective   Subjective pt agreeable to perform skilled PT   Roll Left and Right   Type of Assistance Needed Independent   Roll Left and Right CARE Score 6   Sit to Lying   Type of Assistance Needed Independent   Sit to Lying CARE Score 6   Lying to Sitting on Side of Bed   Type of Assistance Needed Independent   Lying to Sitting on Side of Bed CARE Score 6   Sit to Stand   Type of Assistance Needed Independent; Adaptive equipment   Sit to Stand CARE Score 6   Bed-Chair Transfer   Type of Assistance Needed Independent; Adaptive equipment   Chair/Bed-to-Chair Transfer CARE Score 6   Transfer Bed/Chair/Wheelchair   Adaptive Equipment Roller Walker   Walk 10 Feet   Type of Assistance Needed Independent; Adaptive equipment   Walk 10 Feet CARE Score 6   Walk 50 Feet with Two Turns   Type of Assistance Needed Set-up / clean-up; Adaptive equipment   Comment RW   Walk 50 Feet with Two Turns CARE Score 5   Ambulation   Primary Mode of Locomotion Prior to Admission Walk   Distance Walked (feet) 50 ft   Assist Device Roller Walker   Findings walking   with RW   Does the patient walk? 2  Yes   Assessment   Treatment Assessment pt needed unit blood , pt vtials 120/80 in supine , sitting 110/72 and in standing 89/59   Dizziness note with pt post walking 50 feet with RW and standing BP is 79/62 c/o lightheaded and dizziness  Pt back to bed with nsging aware, F/T post next session tomorrow , Cont POC to meet DC goals , pt supine with all needs in reach and nsging aware     Barriers to Discharge Inaccessible home environment;Decreased caregiver support   Plan   Progress Progressing toward goals   Recommendation   PT Discharge Recommendation Home with home health rehabilitation PT Therapy Minutes   PT Time In 1440   PT Time Out 1505   PT Total Time (minutes) 25   PT Mode of treatment - Individual (minutes) 25   PT Mode of treatment - Concurrent (minutes) 0   PT Mode of treatment - Group (minutes) 0   PT Mode of treatment - Co-treat (minutes) 0   PT Mode of Treatment - Total time(minutes) 25 minutes   PT Cumulative Minutes 1265   Therapy Time missed   Time missed?  No

## 2023-03-11 NOTE — PROGRESS NOTES
NEPHROLOGY PROGRESS NOTE   Yas Lu 61 y o  male MRN: [id number]  Unit/Bed#: -01 Encounter: [id number]  Reason for Consult: Elevated creatinine on CKD stage III, hyponatremia    ASSESSMENT/PLAN:  Elevated creatinine on CKD stage III: Had acute kidney injury prior to transfer to rehab with creatinine of 2 52, currently resolved  -Baseline creatinine 1 2-1 4   -Awaiting repeat labs from this morning   -Previously off of IV fluids, however primary team has started on normal saline this morning at 75 cc/hr  will stop after 1 L   -Recommend avoiding nephrotoxins, hypotension, IV contrast   -I/O  Hyponatremia: Na level 131-138 prior to rehab  Suspect volume mediated +/-SIADH  -Sodium level has been 128-130 the past 3 days   -Awaiting repeat a m  labs  -Started on Normal saline per primary care team   Will discontinue after 1 L   -Monitoring on fluid restriction  Metabolic acidosis: Suspect due to increased GI loss with diarrhea   -Currently on sodium bicarbonate 1300 mg 3 times daily  Will continue the same  Orthostatic hypotension: Suspect due to volume depletion  Blood pressure is currently stable and acceptable  -Continue midodrine 5 mg 3 times daily   -Continue to monitor orthostatics  -May give fluid bolus as needed  Cholecystitis status post PERC [place] 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: Currently with perc choely drain that is capped  Currently with ileostomy with loose stools  -Further management per GI team     Bilateral pleural effusions: Planning on thoracentesis on Monday  Left hepatic lobe abscess: Status post drain removal on 2/24 with recurrence  -IR team consulted for new catheter placement  Planning on Monday to coordinate with thoracentesis  SIRS/sepsis: Suspect secondary to above  Currently off of antibiotics  Awaiting IR drainage on Monday      Metastatic colon adenocarcinoma: Status post resection, chemo, recent hepatic resection and ablation, and transverse colon resection with ileostomy  Unfortunately with mets to liver and possibly lung  Anemia:  -Continue to monitor and transfuse as needed for hemoglobin less than 7 0  Disposition: Requiring additional rehabilitation stay  SUBJECTIVE:  Patient is resting in his bed  He reports having some abdominal discomfort  He denies chest discomfort or shortness of breath  He remains on room air  He denies nausea  He reports having normal appetite  He continues to have loose stools      OBJECTIVE:  Current Weight: Weight - Scale: 80 1 kg (176 lb 9 4 oz)  Vitals:    03/10/23 1300 03/10/23 1351 03/10/23 2220 03/11/23 0732   BP: (!) 80/48 115/75 130/76 132/80   BP Location: Right arm Right arm Right arm Right arm   Pulse: (!) 107 102 98 85   Resp:  17 18 16   Temp:  98 1 °F (36 7 °C) 97 9 °F (36 6 °C) 98 2 °F (36 8 °C)   TempSrc:  Oral Oral Oral   SpO2:  97% 98% 96%   Weight:       Height:           Intake/Output Summary (Last 24 hours) at 3/11/2023 1212  Last data filed at 3/11/2023 1005  Gross per 24 hour   Intake 300 ml   Output 775 ml   Net -475 ml     General: NAD  Skin: warm, dry, intact, no rash  HEENT: Moist mucous membranes, sclera anicteric, normocephalic, atraumatic  Neck: No apparent JVD appreciated  Chest: lung sounds clear B/L, on RA   CVS:Regular rate and rhythm, no murmer   Abdomen: Soft, round, non-tender, +BS, ileostomy  Extremities: No B/L LE edema present  Neuro: alert and oriented  Psych: appropriate mood and affect     Medications:    Current Facility-Administered Medications:   •  acetaminophen (TYLENOL) tablet 650 mg, 650 mg, Oral, Q4H PRN, Lelon Sonya, DO, 650 mg at 03/01/23 1956  •  alteplase (CATHFLO) injection 2 mg, 2 mg, Intracatheter, Once, GAURANG Guzman  •  aspirin chewable tablet 81 mg, 81 mg, Oral, Daily, Letonen Sonya, DO, 81 mg at 03/11/23 3350  •  atorvastatin (LIPITOR) tablet 40 mg, 40 mg, Oral, Daily, Lelon Sonya, DO, 40 mg at 03/11/23 7427  •  calcium carbonate (TUMS) chewable tablet 500 mg, 500 mg, Oral, TID PRN, GAURANG Keith, 500 mg at 03/01/23 1715  •  collagenase (SANTYL) ointment, , Topical, Daily, Lata Ruiz DO, Given at 03/10/23 1542  •  DULoxetine (CYMBALTA) delayed release capsule 30 mg, 30 mg, Oral, Daily, Lata Ruiz DO, 30 mg at 03/11/23 3383  •  enoxaparin (LOVENOX) subcutaneous injection 40 mg, 40 mg, Subcutaneous, Q24H [address], Flory Reed MD, 40 mg at 03/11/23 5283  •  hydrOXYzine HCL (ATARAX) tablet 10 mg, 10 mg, Oral, HS, Lata Ruiz DO, 10 mg at 03/10/23 2155  •  insulin lispro (HumaLOG) 100 units/mL subcutaneous injection 1-6 Units, 1-6 Units, Subcutaneous, TID AC, 1 Units at 03/11/23 1202 **AND** Fingerstick Glucose (POCT), , , 4x Daily AC and at bedtime, Lata Ruiz DO  •  insulin lispro (HumaLOG) 100 units/mL subcutaneous injection 1-6 Units, 1-6 Units, Subcutaneous, HS, Lata Ruiz DO, 1 Units at 03/06/23 2147  •  melatonin tablet 6 mg, 6 mg, Oral, HS PRN, Lata Ruiz DO, 6 mg at 02/28/23 2238  •  methocarbamol (ROBAXIN) tablet 500 mg, 500 mg, Oral, BID PRN, Lata Ruiz DO, 500 mg at 03/03/23 2406  •  midodrine (PROAMATINE) tablet 5 mg, 5 mg, Oral, TID AC, GAURANG Randolph, 5 mg at 03/11/23 0741  •  ondansetron [facility]) injection 4 mg, 4 mg, Intravenous, Q4H PRN, Lata Ruiz DO, 4 mg at 03/11/23 1206  •  oxyCODONE (ROXICODONE) IR tablet 2 5 mg, 2 5 mg, Oral, Q4H PRN, Lata Ruiz DO  •  oxyCODONE (ROXICODONE) IR tablet 5 mg, 5 mg, Oral, Q4H PRN, Lata Ruiz DO, 5 mg at 03/10/23 2155  •  pantoprazole (PROTONIX) EC tablet 40 mg, 40 mg, Oral, BID AC, Lata Ruiz DO, 40 mg at 03/11/23 0741  •  simethicone (MYLICON) chewable tablet 80 mg, 80 mg, Oral, 4x Daily (with meals and at bedtime), Lata Ruiz DO, 80 mg at 03/11/23 1210  •  sodium bicarbonate tablet 1,300 mg, 1,300 mg, Oral, TID after meals, Dio Menard MD, 1,300 mg at 03/10/23 1814  •  sodium chloride 0 9 % infusion, 75 mL/hr, Intravenous, Continuous, VonGAURANG [place]  •  tamsulosin (FLOMAX) capsule 0 4 mg, 0 4 mg, Oral, Daily With Dinner, Ida To DO, 0 4 mg at 03/10/23 1551    Laboratory Results:  Results from last 7 days   Lab Units 03/11/23  1052 03/10/23  0647 03/09/23  0647 03/08/23  0615   WBC Thousand/uL 16 91* 16 09* 16 32*  --    HEMOGLOBIN g/dL 7 1* 7 3* 7 4*  --    HEMATOCRIT % 24 0* 23 4* 23 6*  --    PLATELETS Thousands/uL 345 350 357  --    SODIUM mmol/L  --  130* 131* 129*   POTASSIUM mmol/L  --  4 2 4 1 4 0   CHLORIDE mmol/L  --  102 105 105   CO2 mmol/L  --  18* 18* 17*   BUN mg/dL  --  20 19 22   CREATININE mg/dL  --  1 38* 1 36* 1 54*   CALCIUM mg/dL  --  8 2* 8 3 8 1*   ALK PHOS U/L  --  984* 972* 982*   ALT U/L  --  15 16 14   AST U/L  --  86* 87* 90*

## 2023-03-11 NOTE — PROGRESS NOTES
Progress Note - Infectious Disease   Steffanie Russo 61 y o  male MRN: [id number]  Unit/Bed#: -01 Encounter: [id number]      Impression/Recommendations:  1  Intra-abdominal abscess  Patient is status post IR drain in early December, with culture growing ESBL producing E  coli  He completed 7-day course of IV ertapenem  Patient developed recurrent abscess, status post repeat IR drain in late December, with culture growing ESBL producing E  coli again  Patient completed 21 days of IV ertapenem on   Drains removed last month  Due to persistent leukocytosis, abdominal MRI was done, showing recurrent perihepatic collection  Patient remains afebrile and remains systemically well  Abdominal exam benign  Collection should be really drain  With patient clinically stable, will hold off on any empiric antibiotic, to allow better yield from culture  If patient deteriorates clinically, will restart ertapenem  Hold off on any empiric antibiotic for now  Recommend IR evaluation for drainage of intra-abdominal collection  Can restart IV ertapenem after drainage  Monitor abdominal pain  Monitor temperature/WBC  2   Persistent leukocytosis, most likely secondary to redevelopment of intra-abdominal abscess, as in above  Monitor WBC  3   Coloenteric fistula  4   Metastatic colon cancer  5   CKD  Creatinine baseline  Hospitalization records reviewed in detail  Discussed with Dr Gabe Mckeon from primary service earlier  Antibiotics:  None    Subjective:      Objective:  Vitals:  Temp:  [97 7 °F (36 5 °C)-98 2 °F (36 8 °C)] 98 2 °F (36 8 °C)  HR:  [] 85  Resp:  [16-20] 18  BP: ()/(62-82) 129/80  SpO2:  [94 %-98 %] 96 %  Temp (24hrs), Av 9 °F (36 6 °C), Min:97 7 °F (36 5 °C), Max:98 2 °F (36 8 °C)  Current: Temperature: 98 2 °F (36 8 °C)    Physical Exam:     General: Awake, alert, cooperative, no distress  Neck:  Supple  No mass  No lymphadenopathy     Lungs: Expansion symmetric, no rales, no wheezing, respirations unlabored  Heart:  Regular rate and rhythm, S1 and S2 normal, no murmur  Abdomen: Soft, nondistended, non-tender, bowel sounds active all four quadrants, no masses, no organomegaly  Extremities: No edema  No erythema/warmth  No ulcer  Nontender to palpation  Skin:  No rash  Neuro: Moves all extremities  Invasive Devices     Central Venous Catheter Line  Duration           Port A Cath 03/10/22 Right Chest 366 days          Drain  Duration           Ileostomy  days    Cholecystostomy Tube 15 days                Labs studies:   I have personally reviewed pertinent labs  Results from last 7 days   Lab Units 03/11/23  1052 03/10/23  0647 03/09/23  0647   POTASSIUM mmol/L 4 1 4 2 4 1   CHLORIDE mmol/L 103 102 105   CO2 mmol/L 20* 18* 18*   BUN mg/dL 20 20 19   CREATININE mg/dL 1 53* 1 38* 1 36*   EGFR ml/min/1 73sq m 49 55 56   CALCIUM mg/dL 8 0* 8 2* 8 3   AST U/L 70* 86* 87*   ALT U/L 13 15 16   ALK PHOS U/L 947* 984* 972*     Results from last 7 days   Lab Units 03/11/23  1052 03/10/23  0647 03/09/23  0647   WBC Thousand/uL 16 91* 16 09* 16 32*   HEMOGLOBIN g/dL 7 1* 7 3* 7 4*   PLATELETS Thousands/uL 345 350 357     Results from last 7 days   Lab Units 03/08/23  1629   C DIFF TOXIN B BY PCR  Negative       Imaging Studies:   I have personally reviewed pertinent imaging study reports and images in PACS  EKG, Pathology, and Other Studies:   I have personally reviewed pertinent reports

## 2023-03-11 NOTE — PLAN OF CARE
Reviewed    Problem: PAIN - ADULT  Goal: Verbalizes/displays adequate comfort level or baseline comfort level  Description: Interventions:  - Encourage patient to monitor pain and request assistance  - Assess pain using appropriate pain scale  - Administer analgesics based on type and severity of pain and evaluate response  - Implement non-pharmacological measures as appropriate and evaluate response  - Consider cultural and social influences on pain and pain management  - Notify physician/advanced practitioner if interventions unsuccessful or patient reports new pain  Outcome: Progressing     Problem: INFECTION - ADULT  Goal: Absence or prevention of progression during hospitalization  Description: INTERVENTIONS:  - Assess and monitor for signs and symptoms of infection  - Monitor lab/diagnostic results  - Monitor all insertion sites, i e  indwelling lines, tubes, and drains  - Monitor endotracheal if appropriate and nasal secretions for changes in amount and color  - [place] appropriate cooling/warming therapies per order  - Administer medications as ordered  - Instruct and encourage patient and family to use good hand hygiene technique  - Identify and instruct in appropriate isolation precautions for identified infection/condition  Outcome: Progressing  Goal: Absence of fever/infection during neutropenic period  Description: INTERVENTIONS:  - Monitor WBC    Outcome: Progressing     Problem: SAFETY ADULT  Goal: Patient will remain free of falls  Description: INTERVENTIONS:  - Educate patient/family on patient safety including physical limitations  - Instruct patient to call for assistance with activity   - Consult OT/PT to assist with strengthening/mobility   - Keep Call bell within reach  - Keep bed low and locked with side rails adjusted as appropriate  - Keep care items and personal belongings within reach  - Initiate and maintain comfort rounds  - Make Fall Risk Sign visible to staff  - Offer Toileting every 4 Hours, in advance of need  - Apply yellow socks and bracelet for high fall risk patients  - Consider moving patient to room near nurses station  Outcome: Progressing  Goal: Maintain or return to baseline ADL function  Description: INTERVENTIONS:  -  Assess patient's ability to carry out ADLs; assess patient's baseline for ADL function and identify physical deficits which impact ability to perform ADLs (bathing, care of mouth/teeth, toileting, grooming, dressing, etc )  - Assess/evaluate cause of self-care deficits   - Assess range of motion  - Assess patient's mobility; develop plan if impaired  - Assess patient's need for assistive devices and provide as appropriate  - Encourage maximum independence but intervene and supervise when necessary  - Involve family in performance of ADLs  - Assess for home care needs following discharge   - Consider OT consult to assist with ADL evaluation and planning for discharge  - Provide patient education as appropriate  Outcome: Progressing  Goal: Maintains/Returns to pre admission functional level  Description: INTERVENTIONS:  - Set and communicate daily mobility goal to care team and patient/family/caregiver     - Collaborate with rehabilitation services on mobility goals if consulted  - Out of bed for toileting  - Record patient progress and toleration of activity level   Outcome: Progressing     Problem: DISCHARGE PLANNING  Goal: Discharge to home or other facility with appropriate resources  Description: INTERVENTIONS:  - Identify barriers to discharge w/patient and caregiver  - Arrange for needed discharge resources and transportation as appropriate  - Identify discharge learning needs (meds, wound care, etc )  - Arrange for interpretive services to assist at discharge as needed  - Refer to Case Management Department for coordinating discharge planning if the patient needs post-hospital services based on physician/advanced practitioner order or complex needs related to functional status, cognitive ability, or social support system  Outcome: Progressing     Problem: Prexisting or High Potential for Compromised Skin Integrity  Goal: Skin integrity is maintained or improved  Description: INTERVENTIONS:  - Identify patients at risk for skin breakdown  - Assess and monitor skin integrity  - Assess and monitor nutrition and hydration status  - Monitor labs   - Assess for incontinence   - Turn and reposition patient  - Assist with mobility/ambulation  - Relieve pressure over bony prominences  - Avoid friction and shearing  - Provide appropriate hygiene as needed including keeping skin clean and dry  - Evaluate need for skin moisturizer/barrier cream  - Collaborate with interdisciplinary team   - Patient/family teaching  - Consider wound care consult   Outcome: Progressing     Problem: Nutrition/Hydration-ADULT  Goal: Nutrient/Hydration intake appropriate for improving, restoring or maintaining nutritional needs  Description: Monitor and assess patient's nutrition/hydration status for malnutrition  Collaborate with interdisciplinary team and initiate plan and interventions as ordered  Monitor patient's weight and dietary intake as ordered or per policy  Utilize nutrition screening tool and intervene as necessary  Determine patient's food preferences and provide high-protein, high-caloric foods as appropriate       INTERVENTIONS:  - Monitor oral intake, urinary output, labs, and treatment plans  - Assess nutrition and hydration status and recommend course of action  - Evaluate amount of meals eaten  - Assist patient with eating if necessary   - Allow adequate time for meals  - Recommend/ encourage appropriate diets, oral nutritional supplements, and vitamin/mineral supplements  - Order, calculate, and assess calorie counts as needed  - Recommend, monitor, and adjust tube feedings and TPN/PPN based on assessed needs  - Assess need for intravenous fluids  - Provide specific nutrition/hydration education as appropriate  - Include patient/family/caregiver in decisions related to nutrition  Outcome: Progressing     Problem: MOBILITY - ADULT  Goal: Maintain or return to baseline ADL function  Description: INTERVENTIONS:  -  Assess patient's ability to carry out ADLs; assess patient's baseline for ADL function and identify physical deficits which impact ability to perform ADLs (bathing, care of mouth/teeth, toileting, grooming, dressing, etc )  - Assess/evaluate cause of self-care deficits   - Assess range of motion  - Assess patient's mobility; develop plan if impaired  - Assess patient's need for assistive devices and provide as appropriate  - Encourage maximum independence but intervene and supervise when necessary  - Involve family in performance of ADLs  - Assess for home care needs following discharge   - Consider OT consult to assist with ADL evaluation and planning for discharge  - Provide patient education as appropriate  Outcome: Progressing  Goal: Maintains/Returns to pre admission functional level  Description: INTERVENTIONS:  - Set and communicate daily mobility goal to care team and patient/family/caregiver     - Collaborate with rehabilitation services on mobility goals if consulted  - Out of bed for toileting  - Record patient progress and toleration of activity level   Outcome: Progressing

## 2023-03-11 NOTE — PROGRESS NOTES
Internal Medicine Progress Note  Patient: Daniela Paul  Age/sex: 61 y o  male  Medical Record #: [id number]      ASSESSMENT/PLAN: (Interval History)  Daniela Paul is seen and examined and management for following issues:    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  • s/p diverting loop colostomy/liver biopsy with subsequent chemotherapy 2/2022  • s/p hepatic resection and reversal of colostomy on 11/7/22  • s/p ex-lap with bowel resection/VAC placement 11/16/22  • s/p right hemicolectomy with partial descending colectomy, colocolonic anastomosis with loop ileostomy and mid line abdominal VAC placement 11/17  • s/p additional drains placed for a perisplenic abscess and splenic abscess 12/20/22 (then had 3 drains plus the already existing perc radha tube)  • The 2 left lateral drains were removed by IR 1/31/23 as an OP  • Had previously been tx'd with Ertapenem  • This hospital stay had fever/leukocytosis/sepsis, started Meropenem and then to Vancomycin and Ertapenem  • Wound cx again = Ecoli ESBL  • LD Ertapenem 2/14, Vancomycin 2/16  • To IR 2/24 = Abscessogram of midline drain showed minimal persistent collection and the midline catheter was removed  • Recent wound debridement by general surgery prior to transfer = continue Santyl qd to wound  • Has chr elevated Alk phos      Acute cholecystitis  • s/p percutaneous tube placement 12/8/22  • CT scan was concerning for cholecystitis --> to IR 2/24 = showed retraction of the radha tube and interval occlusion of the cystic duct   +Cholecysto enteric fistula   Radha tube was exchanged   Surgery saw 2/27/23 = stable  • Drainage from perc radha tube milky light green (20ml on 3/4/23 and nothing recorded from 3/5 or 3/6 although he says they did dump some from it on 3/5)  • Perc radha tube check 3/7/23 --> slight adjustment of tube further into GB body and tube is capped = may be removed if pt passes capping trial in 2 weeks and if surgery is in agreement --> Jenelle Hill from [address] TT'd Dr Manan Fam 3/7/23 and he was OK with that plan    • 3/10 MRCP= increased abscess 5 1 cm, innumerable lesions, increased R pleural effusion     PORT catheter  • On 2/24/23 in IR = port check showed probable small thrombus at tip of cathter, flushed/improved aspiration of the port     HTN/orthostasis/tachycardia  • D/c'd Norvasc 2 5mg qd since BPs were too low to get  • Continue TEDS  • HRs are now in 80s to 90s after hydration    • TSH on 3/3/23 = 1 35  • Started Midodrine 2 5mg BID on 3/2/23 (give at 0600, 1100)   •  BP improving    Increased ileostomy drainage  • Had not been an issue prior  • cdiff negative 3/8/23     Diabetes mellitus  • Home:  Lantus 8U qam/Lispro 4U TID  • Here:  cont sliding scale only   • Has a DEXCOM meter and a regular meter at home  • Continue DM diet, QID Accuchecks/SSI  • Does not want lantus and standing insulin     CKD III  • Baseline 1 2-1 4  • Creat was 2 5 on admission with a K+ level of 5 9  • Creat jumped to 1 66 on 3/6/23 and 3/7/23 gave NSS x 1 liter  • Renal following and gave more IVF noting that inc creat may be from increased ileostomy output  • Repeat bmp pending     Metabolic acidosis  • Was on 1/2 NS with sodium bicarb 75 meq started 3/8 = stopped 3/9/23  • Bmp in am     Anemia  • Transfused in December and January  • Hemoglobin was 7 1 on 2/26 = transfused x 1  • stable at 7 3  • Repeat cbc pending     Situational depression  • Cymbalta      Left pleural effusion  • Thoracentesis 1/12 for 300ml  • Cyto from pleural fluid was negative for malignancy; cx = NG  • CT 2/22/23 = "Interval worsening of moderate right and small left pleural effusions"  • MRI shows large rught pleural effusion     Hyponatremia  • Felt likely 2/2 SIADH in setting of malignancy  • NACL tabs were stopped per renal 3/9/23 and changed to sodium bicarbonate tabs to tx metabolic acidosis  • Restart NS 75cc/hr     Malnutrition  • Prefers premier protein banana flavored shake --> wife brought in   One bottle has 30 Gm protein and only 4 carbs     Leukocytosis  • WBC 16  • ID following, watching  • stable        Discharge date:  tba    The above assessment and plan was reviewed and updated as determined by my evaluation of the patient on 3/11/2023      Labs:   Results from last 7 days   Lab Units 03/10/23  0647 03/09/23  0647   WBC Thousand/uL 16 09* 16 32*   HEMOGLOBIN g/dL 7 3* 7 4*   HEMATOCRIT % 23 4* 23 6*   PLATELETS Thousands/uL 350 357     Results from last 7 days   Lab Units 03/10/23  0647 03/09/23  0647   SODIUM mmol/L 130* 131*   POTASSIUM mmol/L 4 2 4 1   CHLORIDE mmol/L 102 105   CO2 mmol/L 18* 18*   BUN mg/dL 20 19   CREATININE mg/dL 1 38* 1 36*   CALCIUM mg/dL 8 2* 8 3             Results from last 7 days   Lab Units 03/11/23  0727 03/10/23  2052 03/10/23  1556   POC GLUCOSE mg/dl 113 116 85       Review of Scheduled Meds:  Current Facility-Administered Medications   Medication Dose Route Frequency Provider Last Rate   • acetaminophen  650 mg Oral Q4H PRN Demetri Mendoza, DO     • aspirin  81 mg Oral Daily Demetri Mendoza, DO     • atorvastatin  40 mg Oral Daily Demetri Mendoza, DO     • calcium carbonate  500 mg Oral TID PRN GAURANG Yadav     • collagenase   Topical Daily Demetri Mendoza, DO     • DULoxetine  30 mg Oral Daily Demetri Mendoza, DO     • enoxaparin  40 mg Subcutaneous Q24H [facility] GAURANG Keith     • hydrOXYzine HCL  10 mg Oral HS Demetri Mendoza, DO     • insulin glargine  5 Units Subcutaneous HS Demetri Kobs, DO     • insulin lispro  1-6 Units Subcutaneous TID AC Demetri Mendoza, DO     • insulin lispro  1-6 Units Subcutaneous HS Demetri Mendoza, DO     • insulin lispro  5 Units Subcutaneous TID With Meals GAURANG Alatorre     • melatonin  6 mg Oral HS PRN Demetri Mendoza, DO     • methocarbamol  500 mg Oral BID PRN Demetri Mendoza, DO     • midodrine  5 mg Oral TID AC GAURANG Valverde     • ondansetron  4 mg Intravenous Q4H PRN [facility],      • oxyCODONE  2 5 mg Oral Q4H PRN [facility], DO     • oxyCODONE  5 mg Oral Q4H PRN [facility],      • pantoprazole  40 mg Oral BID AC [facility], DO     • simethicone  80 mg Oral 4x Daily (with meals and at bedtime) [facility],      • sodium bicarbonate  1,300 mg Oral TID after meals Zeyad Todd MD     • tamsulosin  0 4 mg Oral Daily With [address] [place] [address], DO         Subjective/ HPI: Patient seen and examined  Patients overnight issues or events were reviewed with nursing or staff during rounds or morning huddle session  New or overnight issues include the following:     Pt  C/o being fatigued, wants lantus and coverage dc'd, feels this can be contributing to his fatigue  ROS:   A 10 point ROS was performed; negative except as noted above  Imaging:     MRI abdomen wo contrast and mrcp   Final Result by Edward Buckner MD (03/10 0073)      No choledocholithiasis  Increased size of T2 hyperintense focus at the left hepatic lobe now measuring 5 1 cm consistent with enlarging abscess cavity following recent removal of drainage catheter  Innumerable hepatic metastatic lesions again seen  Increase in size of large right pleural effusion  Small left pleural effusion                  Workstation performed: [id number]         IR cholecystostomy tube check/change/reposition/reinsertion/upsize   Final Result by Roberto Chavarria MD (03/10 1396)   Impression: Resolution of coloenteric fistula  Patent cystic and common bile duct, filling of contrast into the duodenum briskly  Tube capped, will return in 2 weeks for possible tube removal depending on input from surgeons  Continue to flush with 10    mL normal saline daily  Signed, performed, and dictated by GAURANG Elmore under the supervision of Roberto Chavarria        Workstation performed: [id number]             [facility] /Radiology studies reviewed  *Medications reviewed and reconciled as needed  *Please refer to order section for additional ordered labs studies  *Case discussed with primary attending during morning huddle case rounds    Physical Examination:  Vitals:   Vitals:    03/10/23 1300 03/10/23 1351 03/10/23 2220 03/11/23 0732   BP: (!) 80/48 115/75 130/76 132/80   BP Location: Right arm Right arm Right arm Right arm   Pulse: (!) 107 102 98 85   Resp:  17 18 16   Temp:  98 1 °F (36 7 °C) 97 9 °F (36 6 °C) 98 2 °F (36 8 °C)   TempSrc:  Oral Oral Oral   SpO2:  97% 98% 96%   Weight:       Height:           GEN: No apparent distress, interactive, fatigued  NEURO: Alert and oriented x3  HEENT: Pupils are equal and reactive, EOMI, mucous membranes are moist, face symmetrical  CV: S1 S2 regular, no MRG, no peripheral edema noted  RESP: Lungs are clear bilaterally, no wheezes, rales or rhonchi noted, on room air, respirations easy and non labored  GI: Flat, RUQ chronic pain, non distended; +BS x4; soft brown stool in ileostomy  : Voiding without difficulty  MUSC: Moves all extremities; generalized weakness  SKIN: pale, warm and dry, normal turgor          The above physical exam was reviewed and updated as determined by my evaluation of the patient on 3/11/2023  Invasive Devices     Central Venous Catheter Line  Duration           Port A Cath 03/10/22 Right Chest 365 days          Drain  Duration           Ileostomy  days    Cholecystostomy Tube 14 days                   VTE Pharmacologic Prophylaxis: Enoxaparin  Code Status: Level 1 - Full Code  Current Length of Stay: 15 day(s)      Total time spent:  30 minutes with more than 50% spent counseling/coordinating care  Counseling includes discussion with patient re: progress  and discussion with patient of his/her current medical state/information  Coordination of patient's care was performed in conjunction with primary service   Time invested included review of patient's labs, vitals, and management of their comorbidities with continued monitoring  In addition, this patient was discussed with medical team including physician and advanced extenders  The care of the patient was extensively discussed and appropriate treatment plan was formulated unique for this patient  Medical decision making for the day was made by supervising physician unless otherwise noted in their attestation statement  ** Please Note:  voice to text software may have been used in the creation of this document   Although proof errors in transcription or interpretation are a potential of such software**

## 2023-03-11 NOTE — CONSULTS
e-Consult (IPC)  - Interventional Radiology  Leobardo Camarena 61 y o  male MRN: [id number]  Unit/Bed#: [facility] 111-19 Encounter: [id number]    Interventional Radiology has been consulted to evaluate Leobardo Camarena    We were consulted by Dr Bonny Camerno concerning this patient with recurrent abscess  Inpatient Consult to IR  Consult performed by: Shruthi Gregorio MD  Consult ordered by: Cal Sweeney MD        03/11/23    Assessment/Recommendation:   Recurrent 5 cm left hepatic lobe abscess s/p drain removal on 2/24  IR to place a new catheter on this admission  Patient not NPO today so we can perform this on Monday along with his thoracentesis while he is in IR      21-30 minutes, >50% of the total time devoted to medical consultative verbal/EMR discussion between providers  Written report will be generated in the EMR  Thank you for allowing Interventional Radiology to participate in the care of Leobardo Camarena  Please don't hesitate to call or TigerText us with any questions       Shruthi Gregorio MD

## 2023-03-12 LAB
ABO GROUP BLD BPU: NORMAL
ANION GAP SERPL CALCULATED.3IONS-SCNC: 6 MMOL/L (ref 4–13)
BASOPHILS # BLD AUTO: 0.07 THOUSANDS/ÂΜL (ref 0–0.1)
BASOPHILS NFR BLD AUTO: 0 % (ref 0–1)
BPU ID: NORMAL
BUN SERPL-MCNC: 20 MG/DL (ref 5–25)
CALCIUM SERPL-MCNC: 8.1 MG/DL (ref 8.3–10.1)
CHLORIDE SERPL-SCNC: 103 MMOL/L (ref 96–108)
CO2 SERPL-SCNC: 21 MMOL/L (ref 21–32)
CREAT SERPL-MCNC: 1.45 MG/DL (ref 0.6–1.3)
CROSSMATCH: NORMAL
EOSINOPHIL # BLD AUTO: 0 THOUSAND/ÂΜL (ref 0–0.61)
EOSINOPHIL NFR BLD AUTO: 0 % (ref 0–6)
ERYTHROCYTE [DISTWIDTH] IN BLOOD BY AUTOMATED COUNT: 17 % (ref 11.6–15.1)
GFR SERPL CREATININE-BSD FRML MDRD: 52 ML/MIN/1.73SQ M
GLUCOSE P FAST SERPL-MCNC: 115 MG/DL (ref 65–99)
GLUCOSE SERPL-MCNC: 115 MG/DL (ref 65–140)
GLUCOSE SERPL-MCNC: 128 MG/DL (ref 65–140)
GLUCOSE SERPL-MCNC: 149 MG/DL (ref 65–140)
GLUCOSE SERPL-MCNC: 164 MG/DL (ref 65–140)
GLUCOSE SERPL-MCNC: 195 MG/DL (ref 65–140)
HCT VFR BLD AUTO: 26.3 % (ref 36.5–49.3)
HGB BLD-MCNC: 8.2 G/DL (ref 12–17)
IMM GRANULOCYTES # BLD AUTO: 0.28 THOUSAND/UL (ref 0–0.2)
IMM GRANULOCYTES NFR BLD AUTO: 1 % (ref 0–2)
LYMPHOCYTES # BLD AUTO: 1.51 THOUSANDS/ÂΜL (ref 0.6–4.47)
LYMPHOCYTES NFR BLD AUTO: 7 % (ref 14–44)
MCH RBC QN AUTO: 26 PG (ref 26.8–34.3)
MCHC RBC AUTO-ENTMCNC: 31.2 G/DL (ref 31.4–37.4)
MCV RBC AUTO: 84 FL (ref 82–98)
MONOCYTES # BLD AUTO: 1.69 THOUSAND/ÂΜL (ref 0.17–1.22)
MONOCYTES NFR BLD AUTO: 8 % (ref 4–12)
NEUTROPHILS # BLD AUTO: 16.72 THOUSANDS/ÂΜL (ref 1.85–7.62)
NEUTS SEG NFR BLD AUTO: 84 % (ref 43–75)
NRBC BLD AUTO-RTO: 0 /100 WBCS
PLATELET # BLD AUTO: 365 THOUSANDS/UL (ref 149–390)
PMV BLD AUTO: 9.8 FL (ref 8.9–12.7)
POTASSIUM SERPL-SCNC: 4.2 MMOL/L (ref 3.5–5.3)
RBC # BLD AUTO: 3.15 MILLION/UL (ref 3.88–5.62)
SODIUM SERPL-SCNC: 130 MMOL/L (ref 135–147)
UNIT DISPENSE STATUS: NORMAL
UNIT PRODUCT CODE: NORMAL
UNIT PRODUCT VOLUME: 350 ML
UNIT RH: NORMAL
WBC # BLD AUTO: 20.27 THOUSAND/UL (ref 4.31–10.16)

## 2023-03-12 RX ORDER — LANOLIN ALCOHOL/MO/W.PET/CERES
6 CREAM (GRAM) TOPICAL
Status: DISCONTINUED | OUTPATIENT
Start: 2023-03-12 | End: 2023-03-14 | Stop reason: HOSPADM

## 2023-03-12 RX ORDER — SODIUM CHLORIDE 9 MG/ML
75 INJECTION, SOLUTION INTRAVENOUS CONTINUOUS
Status: DISCONTINUED | OUTPATIENT
Start: 2023-03-12 | End: 2023-03-14

## 2023-03-12 RX ADMIN — ATORVASTATIN CALCIUM 40 MG: 40 TABLET, FILM COATED ORAL at 08:19

## 2023-03-12 RX ADMIN — SIMETHICONE 80 MG: 80 TABLET, CHEWABLE ORAL at 08:19

## 2023-03-12 RX ADMIN — DULOXETINE HYDROCHLORIDE 30 MG: 30 CAPSULE, DELAYED RELEASE ORAL at 08:19

## 2023-03-12 RX ADMIN — ONDANSETRON 4 MG: 2 INJECTION INTRAMUSCULAR; INTRAVENOUS at 08:20

## 2023-03-12 RX ADMIN — MIDODRINE HYDROCHLORIDE 5 MG: 5 TABLET ORAL at 11:59

## 2023-03-12 RX ADMIN — SIMETHICONE 80 MG: 80 TABLET, CHEWABLE ORAL at 15:54

## 2023-03-12 RX ADMIN — SODIUM BICARBONATE 1300 MG: 650 TABLET ORAL at 08:27

## 2023-03-12 RX ADMIN — PANTOPRAZOLE SODIUM 40 MG: 40 TABLET, DELAYED RELEASE ORAL at 15:54

## 2023-03-12 RX ADMIN — MIDODRINE HYDROCHLORIDE 5 MG: 5 TABLET ORAL at 08:19

## 2023-03-12 RX ADMIN — SIMETHICONE 80 MG: 80 TABLET, CHEWABLE ORAL at 11:59

## 2023-03-12 RX ADMIN — OXYCODONE HYDROCHLORIDE 5 MG: 5 TABLET ORAL at 21:51

## 2023-03-12 RX ADMIN — ONDANSETRON 4 MG: 2 INJECTION INTRAMUSCULAR; INTRAVENOUS at 12:36

## 2023-03-12 RX ADMIN — MIDODRINE HYDROCHLORIDE 5 MG: 5 TABLET ORAL at 15:54

## 2023-03-12 RX ADMIN — PANTOPRAZOLE SODIUM 40 MG: 40 TABLET, DELAYED RELEASE ORAL at 08:19

## 2023-03-12 RX ADMIN — SIMETHICONE 80 MG: 80 TABLET, CHEWABLE ORAL at 21:51

## 2023-03-12 RX ADMIN — MELATONIN 6 MG: at 21:51

## 2023-03-12 RX ADMIN — SODIUM BICARBONATE 1300 MG: 650 TABLET ORAL at 12:42

## 2023-03-12 RX ADMIN — ENOXAPARIN SODIUM 40 MG: 40 INJECTION SUBCUTANEOUS at 08:19

## 2023-03-12 RX ADMIN — SODIUM CHLORIDE 75 ML/HR: 0.9 INJECTION, SOLUTION INTRAVENOUS at 14:29

## 2023-03-12 RX ADMIN — INSULIN LISPRO 1 UNITS: 100 INJECTION, SOLUTION INTRAVENOUS; SUBCUTANEOUS at 15:52

## 2023-03-12 RX ADMIN — COLLAGENASE SANTYL: 250 OINTMENT TOPICAL at 12:00

## 2023-03-12 RX ADMIN — INSULIN LISPRO 2 UNITS: 100 INJECTION, SOLUTION INTRAVENOUS; SUBCUTANEOUS at 12:00

## 2023-03-12 RX ADMIN — ERTAPENEM SODIUM 1000 MG: 1 INJECTION, POWDER, LYOPHILIZED, FOR SOLUTION INTRAMUSCULAR; INTRAVENOUS at 17:05

## 2023-03-12 RX ADMIN — HYDROXYZINE HYDROCHLORIDE 10 MG: 10 TABLET ORAL at 21:51

## 2023-03-12 RX ADMIN — OXYCODONE HYDROCHLORIDE 5 MG: 5 TABLET ORAL at 09:32

## 2023-03-12 RX ADMIN — ASPIRIN 81 MG CHEWABLE TABLET 81 MG: 81 TABLET CHEWABLE at 08:19

## 2023-03-12 RX ADMIN — SODIUM BICARBONATE 1300 MG: 650 TABLET ORAL at 17:13

## 2023-03-12 RX ADMIN — TAMSULOSIN HYDROCHLORIDE 0.4 MG: 0.4 CAPSULE ORAL at 15:54

## 2023-03-12 RX ADMIN — ONDANSETRON 4 MG: 2 INJECTION INTRAMUSCULAR; INTRAVENOUS at 17:02

## 2023-03-12 NOTE — CONSULTS
Consultation - Palliative and Supportive Care   Vic Mart 61 y o  male [id number]    Patient Active Problem List   Diagnosis   • Type 2 diabetes mellitus without complication, with long-term current use of insulin (HCC)   • Benign essential hypertension   • Mixed hyperlipidemia   • Erectile dysfunction   • Low testosterone   • Obesity (BMI 30-39  9)   • Testicular hypogonadism   • Incarcerated umbilical hernia   • Left ureteral calculus   • Type 2 diabetes mellitus with hyperlipidemia (HCC)   • Anemia   • Hypokalemia   • Transaminitis   • Thrombocytosis   • Iron deficiency anemia, unspecified   • Malignant neoplasm of transverse colon (HCC)   • 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)   • Colon cancer metastasized to liver [facility])   • Colostomy prolapse (HCC)   • Other fatigue   • Cervical radiculopathy   • Encephalopathy   • MR (mitral regurgitation)   • ESBL (extended spectrum beta-lactamase) producing bacteria infection   • Severe protein-calorie malnutrition (HCC)   • Acute on chronic kidney failure (HCC)   • Sepsis (HCC)   • Abscess   • Acute pain   • Leukocytosis   • Dehiscence of incision   • Elevated alkaline phosphatase level   • Abnormal CT scan   • Hyponatremia   • Nephrolithiasis   • Abdominal wound dehiscence   • Cholecystitis, unspecified   • Depression   • Chronic bilateral pleural effusions     Active issues specifically addressed today include:   • Palliative care encounter  • Intra-abdominal abscess  • Cancer associated pain  • Metastatic colon cancer    Plan:  1  Symptom management -   • No adjustments made today as discussion regarding effectiveness of current regimen was limited  • Consider [facility] unless there is concern regarding masking fevers  • Continue cymbalta 30mg QD  • Atarax 10mg HS  • Melatonin 6mg HS  • Oxycodone 2 5-5mg PO Q4H PRN moderate-severe pain  • Robaxin 500mg BID PRN muscle spasms  • Bowel regimen to prevent OIC    2   Goals -   • Patient expresses desire to go home when medically stable but is worried about his ability to manage his medication regimen  • Conversation limited by patient's disorientation on time of evaluation  • Per PMR team, patient previously expressed desire to avoid readmission if possible but ultimately agreeable if medically indicated  Code Status: full - Level 1   Decisional apparatus:  Patient is not competent on my exam today  If competence is lost, patient's substitute decision maker would default to spouse by [place]  Advance Directive / Living Will / POLST:  None on file    3  Social support  • Patient is supported by his wife and two children  • Will follow-up regarding patient's concerns about managing his own medications upon discharge when his mental status is improved    4  Follow-up  • Palliative care will continue to follow while admitted  • Would benefit from outpatient follow-up upon discharge     I have reviewed the patient's controlled substance dispensing history in the Prescription Drug Monitoring Program in compliance with the [facility] regulations before prescribing any controlled substances  We appreciate the invitation to be involved in this patient's care  We will continue to follow  Please do not hesitate to reach our on call provider through our clinic answering service at  should you have acute symptom control concerns  Matt Moreau PA-C  Palliative and Supportive Care  Clinic/Answering Service: [phone]  You can find me on [employer]! IDENTIFICATION:  Consults  Physician Requesting Consult: Chrissie Valencia DO  Reason for Consult / Principal Problem: support, symptom management  Hx and PE limited by: mental status/patient's participation    HISTORY OF PRESENT ILLNESS:       Eleni Nuñez is a 61 y o  male with metastatic colon cancer known to palliative medicine is seen during acute rehab stay at request of patient for support  Patient is on day 16 of rehab admission    He was transferred on 2/24 after hospitalization at [facility] for systemic weakness and 2 falls  He was treated for sepsis  Source suspected to be midline abdominal incision  Patient had known intra-abdominal collection but with relative improvement on CT imaging  Patient was also treated for cholecystitis and ultimately underwent cholecystostomy tube change  Over the course of patient's rehab stay he has had removal of midline catheter, cholecystostomy tube exchange, MRCP with evidence of increased abscess size, IR consulted and are planning repeat drainage  Rehab has been complicated by orthostatic hypotension  Patient is well-known to palliative team from previous admissions within the months of November, December, and January at which time he had extensive abdominal surgeries including resection of hepatic mass, prolonged hospitalization due to several complications resulting in frequent take back to the OR, requirement of VAC closure  Patient reportedly requested palliative care this morning due to his concerns of being prone to frequent readmissions and for support  However, during time of encounter he is somewhat disoriented and asked provider to return tomorrow  Review of Systems   Constitutional: Positive for malaise/fatigue  Cardiovascular: Negative for chest pain  Respiratory: Negative for shortness of breath  Skin: Positive for poor wound healing  Musculoskeletal:        Denies pain during time of encounter   Gastrointestinal: Negative for abdominal pain and nausea  Neurological: Positive for weakness  Psychiatric/Behavioral: The patient does not have insomnia          Past Medical History:   Diagnosis Date   • Abdominal pain 03/12/2022   • Acute renal failure ([facility] [address] )     [id number] resolved   • Acute respiratory failure with hypoxia ([address] ) 11/12/2022   • Bacteremia 11/12/2022   • Cancer ([address] )    • Diabetes mellitus ([facility] [address] )    • Enteritis 08/23/2016   • Flash pulmonary edema ([place] [address] ) 11/12/2022   • Gastroparesis due to DM ([place] [address] ) 08/23/2016   • GERD (gastroesophageal reflux disease)    • Hernia, ventral 08/04/2016   • Hyperlipidemia    • Hypertension    • Morbid obesity ([place] [address] ) 04/17/2018   • Postoperative visit 03/02/2022   • SIRS (systemic inflammatory response syndrome) ([place] [address] ) 03/12/2022   • Snoring    • Stage 3a chronic kidney disease ([place] [address] ) 02/19/2022     Past Surgical History:   Procedure Laterality Date   • COLONOSCOPY     • COLOSTOMY N/A 02/20/2022    Procedure: COLOSTOMY LOOP, diverting;  Surgeon: Jesus Salmon MD;  Location: [facility];  Service: General   • ESOPHAGOGASTRODUODENOSCOPY N/A 08/24/2016    Procedure: ESOPHAGOGASTRODUODENOSCOPY (EGD); Surgeon: Molly Bautista MD;  Location: [facility];   Service:    • EXPLORATORY LAPAROTOMY W/ BOWEL RESECTION N/A 11/16/2022    Procedure: LAPAROTOMY EXPLORATORY W/ BOWEL RESECTION- VAC PLACEMENT;  Surgeon: Luisa Beltran MD;  Location: [facility];  Service: Surgical Oncology   • EXPLORATORY LAPAROTOMY W/ BOWEL RESECTION N/A 11/17/2022    Procedure: LAPAROTOMY EXPLORATORY W/ BOWEL RESECTION;  Surgeon: Luisa Beltran MD;  [facility];  Service: Surgical Oncology   • ILEOSTOMY N/A 11/17/2022    Procedure: ILEOSTOMY;  Surgeon: Luisa Beltran MD;  [facility];  Service: Surgical Oncology   • ILEOSTOMY CLOSURE N/A 11/7/2022    Procedure: REVERSAL COLOSTOMY;  Surgeon: Rafael Cedeno MD;  [facility];  Service: Surgical Oncology   • IR CHOLECYSTOSTOMY TUBE CHECK AND/OR REMOVAL  2/24/2023   • IR CHOLECYSTOSTOMY TUBE CHECK/CHANGE/REPOSITION/REINSERTION/UPSIZE  12/12/2022   • IR CHOLECYSTOSTOMY TUBE CHECK/CHANGE/REPOSITION/REINSERTION/UPSIZE  3/7/2023   • IR CHOLECYSTOSTOMY TUBE PLACEMENT  12/8/2022   • IR DRAINAGE TUBE CHECK AND/OR REMOVAL  1/3/2023   • IR DRAINAGE TUBE CHECK/CHANGE/REPOSITION/REINSERTION/UPSIZE  1/12/2023   • IR DRAINAGE TUBE CHECK/CHANGE/REPOSITION/REINSERTION/UPSIZE  1/31/2023   • IR DRAINAGE TUBE CHECK/CHANGE/REPOSITION/REINSERTION/UPSIZE  2/10/2023   • IR DRAINAGE TUBE CHECK/CHANGE/REPOSITION/REINSERTION/UPSIZE  2/16/2023   • IR DRAINAGE TUBE CHECK/CHANGE/REPOSITION/REINSERTION/UPSIZE  2/24/2023   • IR DRAINAGE TUBE PLACEMENT  12/8/2022   • IR DRAINAGE TUBE PLACEMENT  12/20/2022   • IR PORT CHECK  2/24/2023   • IR PORT PLACEMENT  03/10/2022   • IR THORACENTESIS  1/12/2023   • KIDNEY STONE SURGERY     • LIVER BIOPSY LAPAROSCOPIC N/A 02/20/2022    Procedure: DIAGNOSTIC LAPAROSCOPIC LIVER BIOPSY, DIVERTING LOOP COLOSTOMY ;  Surgeon: Rogelio Birmingham MD;  Location: [facility];  Service: General   • LIVER LOBECTOMY N/A 11/7/2022    Procedure: SEGMENT 3 LIVER RESECTION, ABLATION, INTRAOPERATIVE U/S OF LIVER, PERITONEAL BIOPSY;  Surgeon: Kati Dorado MD;  [facility]: [facility];  Service: Surgical Oncology   • RIGHT COLON RESECTION N/A 11/7/2022    Procedure: EX LAP, TRANVERSE COLON RESECTION;  Surgeon: Kati Dorado MD;  [facility];  Service: Surgical Oncology   • TONSILLECTOMY     • UMBILICAL HERNIA REPAIR LAPAROSCOPIC N/A 04/26/2019    Procedure: LAPAROSCOPIC UMBILICAL HERNIA REPAIR;  Surgeon: Rogelio Birmingham MD;  [facility]: [facility];  Service: General   • VAC DRESSING APPLICATION N/A 64/83/9300    Procedure: APPLICATION VAC DRESSING ABDOMEN/TRUNK;  Surgeon: Bijan Moise MD;  [facility];  Service: Surgical Oncology     Social History     Socioeconomic History   • Marital status: /Civil Union     Spouse name: Not on file   • Number of children: Not on file   • Years of education: Not on file   • Highest education level: Not on file   Occupational History   • Occupation: ACTOR     Employer: [employer]   Tobacco Use   • Smoking status: Never   • Smokeless tobacco: Never   Vaping Use   • Vaping Use: Never used   Substance and Sexual Activity   • Alcohol use: Never   • Drug use: No   • Sexual activity: Yes     Partners: Female   Other Topics Concern   • Not on file   Social History Narrative   • Not on file     Social Determinants of Health     Financial Resource Strain: Not on file   Food Insecurity: No Food Insecurity   • Worried About Running Out of Food in the Last Year: Never true   • Ran Out of Food in the Last Year: Never true   Transportation Needs: No Transportation Needs   • Lack of Transportation (Medical): No   • Lack of Transportation (Non-Medical): No   Physical Activity: Insufficiently Active   • Days of Exercise per Week: 5 days   • Minutes of Exercise per Session: 10 min   Stress: No Stress Concern Present   • Feeling of Stress : Not at all   Social Connections: Not on file   Intimate Partner Violence: Not on file   Housing Stability: Low Risk    • Unable to Pay for Housing in the Last Year: No   • Number of Places Lived in the Last Year: 1   • Unstable Housing in the Last Year: No     Family History   Problem Relation Age of Onset   • Diabetes Mother         mellitus   • Lung cancer Mother    • Coronary artery disease Father        MEDICATIONS / ALLERGIES:    all current active meds have been reviewed    Allergies   Allergen Reactions   • Shellfish-Derived Products - Food Allergy Anaphylaxis   • Erythromycin GI Intolerance       OBJECTIVE:    Physical Exam  Physical Exam  HENT:      Head: Normocephalic and atraumatic  Eyes:      Conjunctiva/sclera: Conjunctivae normal    Cardiovascular:      Rate and Rhythm: Normal rate  Pulmonary:      Effort: No respiratory distress  Abdominal:      Tenderness: There is no guarding  Musculoskeletal:         General: No swelling  Neurological:      Mental Status: He is alert  Comments: Oriented to self and hospital  Disoriented to day   Psychiatric:      Comments: Slow responses   Somewhat disoriented         Lab Results:  Results from last 7 days   Lab Units 03/12/23  0546 03/11/23  1052 03/10/23  0647   WBC Thousand/uL 20 27* 16 91* 16 09*   HEMOGLOBIN g/dL 8 2* 7 1* 7 3*   HEMATOCRIT % 26 3* 24 0* 23 4*   PLATELETS Thousands/uL 365 345 350   NEUTROS PCT % 84* 79* 82*   MONOS PCT % 8 10 10     Results from last 7 days   Lab Units 03/12/23  0546 03/11/23  1052 03/10/23  0647 03/09/23  0647   POTASSIUM mmol/L 4 2 4 1 4 2 4 1   CHLORIDE mmol/L 103 103 102 105   CO2 mmol/L 21 20* 18* 18*   BUN mg/dL 20 20 20 19   CREATININE mg/dL 1 45* 1 53* 1 38* 1 36*   CALCIUM mg/dL 8 1* 8 0* 8 2* 8 3   ALK PHOS U/L  --  947* 984* 972*   ALT U/L  --  13 15 16   AST U/L  --  70* 86* 87*         Imaging Studies: Reviewed pertinent studies  EKG, Pathology, and Other Studies: reviewed pertinent studies    Counseling / Coordination of Care    Total floor / unit time spent today 25 minutes  Greater than 50% of total time was spent with the patient and / or family counseling and / or coordination of care  A description of the counseling / coordination of care: time spent assessing patient, communicating with primary team, and RN        This note was not shared with the patient due to privacy exception: note includes other individuals

## 2023-03-12 NOTE — PROGRESS NOTES
NEPHROLOGY PROGRESS NOTE   Romy Weathers 61 y o  male MRN: [id number]  Unit/Bed#: -01 Encounter: [id number]  Reason for Consult: Elevated creatinine on CKD stage III, hyponatremia    ASSESSMENT/PLAN:  Elevated creatinine on CKD stage III: Had acute kidney injury prior to transfer to rehab with creatinine of 2 52, currently resolved  -Baseline creatinine 1 2-1 4   -creatinine is currently 1 45, at baseline    -Recommend avoiding nephrotoxins, hypotension, IV contrast   -I/O      Hyponatremia: Na level 131-138 prior to rehab  Suspect volume mediated +/-SIADH  -Sodium level has been 128-130 the past 3 days  -repeat am labs show stable Na level at 130   -Monitoring on fluid restriction  Reduce to 1 5 L/24 hours       Metabolic acidosis: Suspect due to increased GI loss with diarrhea   -Currently on sodium bicarbonate 1300 mg 3 times daily  Will continue the same      Orthostatic hypotension: Suspect due to volume depletion  Blood pressure is currently stable and acceptable  -Continue midodrine 5 mg 3 times daily   -Continue to monitor orthostatics      Cholecystitis status post PERC Pooja 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: Currently with perc choely drain that is capped and ileostomy with loose stools  -Further management per GI team      Bilateral pleural effusions: Planning on thoracentesis on Monday      Left hepatic lobe abscess: Status post drain removal on 2/24 with recurrence  -IR team consulted for new catheter placement  Planning on Monday to coordinate with thoracentesis      SIRS/sepsis: Suspect secondary to above  Currently off of antibiotics  Awaiting IR drainage on Monday      Metastatic colon adenocarcinoma: Status post resection, chemo, recent hepatic resection and ablation, and transverse colon resection with ileostomy    Unfortunately with mets to liver and possibly lung      Anemia: S/P PRBC transfusion 03/11   -Continue to monitor and transfuse as needed for hemoglobin less than 7 0        Disposition: Requiring additional stay for rehabilitation  SUBJECTIVE:  The patient is sitting up in his bed  He reports feeling well  He denies chest discomfort or increased shortness of breath  He denies nausea or vomiting  He denies increased output from his ostomy  OBJECTIVE:  Current Weight: Weight - Scale: 80 1 kg (176 lb 9 4 oz)  Vitals:    03/11/23 1549 03/11/23 1925 03/11/23 2118 03/12/23 0500   BP: 129/80 142/86 139/79 125/60   BP Location:   Left arm Left arm   Pulse: 85 86 90 90   Resp: 18 18 18 18   Temp: 98 2 °F (36 8 °C) 97 5 °F (36 4 °C) 97 5 °F (36 4 °C) 98 8 °F (37 1 °C)   TempSrc: Oral  Oral Oral   SpO2: 96%  97% 93%   Weight:       Height:           Intake/Output Summary (Last 24 hours) at 3/12/2023 1113  Last data filed at 3/12/2023 0901  Gross per 24 hour   Intake 526 67 ml   Output 575 ml   Net -48  33 ml     General: NAD  Skin: warm, dry, intact, no rash  HEENT: Moist mucous membranes, sclera anicteric, normocephalic, atraumatic  Neck: No apparent JVD appreciated  Chest: lung sounds clear B/L, on RA   CVS:Regular rate and rhythm, no murmer   Abdomen: Soft, round, non-tender, +BS, ileostomy present  Extremities: No B/L LE edema present  Neuro: alert and oriented  Psych: appropriate mood and affect     Medications:    Current Facility-Administered Medications:   •  acetaminophen (TYLENOL) tablet 650 mg, 650 mg, Oral, Q4H PRN, Leveda Siobhan, DO, 650 mg at 03/01/23 2158  •  alteplase (CATHFLO) injection 2 mg, 2 mg, Intracatheter, Once, GAURANG Wood  •  aspirin chewable tablet 81 mg, 81 mg, Oral, Daily, Leveda Siobhan, DO, 81 mg at 03/12/23 1933  •  atorvastatin (LIPITOR) tablet 40 mg, 40 mg, Oral, Daily, Leveda Siobhan, DO, 40 mg at 03/12/23 8371  •  calcium carbonate (TUMS) chewable tablet 500 mg, 500 mg, Oral, TID PRN, GAURANG Keith, 500 mg at 03/01/23 1715  •  collagenase (SANTYL) ointment, , Topical, Daily, Francoise Mccann DO, Given at 03/11/23 1230  • DULoxetine (CYMBALTA) delayed release capsule 30 mg, 30 mg, Oral, Daily, Kimberly Arias DO, 30 mg at 03/12/23 7871  •  hydrOXYzine HCL (ATARAX) tablet 10 mg, 10 mg, Oral, HS, Kimberly Arias, DO, 10 mg at 03/11/23 2144  •  insulin lispro (HumaLOG) 100 units/mL subcutaneous injection 1-6 Units, 1-6 Units, Subcutaneous, TID AC, 1 Units at 03/11/23 1202 **AND** Fingerstick Glucose (POCT), , , 4x Daily AC and at bedtime, Kimberly Arias DO  •  insulin lispro (HumaLOG) 100 units/mL subcutaneous injection 1-6 Units, 1-6 Units, Subcutaneous, HS, Kimberly Arias DO, 1 Units at 03/06/23 2147  •  melatonin tablet 6 mg, 6 mg, Oral, HS, GAURANG Hernandez  •  methocarbamol (ROBAXIN) tablet 500 mg, 500 mg, Oral, BID PRN, Kimberly Arias DO, 500 mg at 03/03/23 8221  •  midodrine (PROAMATINE) tablet 5 mg, 5 mg, Oral, TID AC, GAURANG Mai, 5 mg at 03/12/23 8367  •  ondansetron (ZOFRAN) injection 4 mg, 4 mg, Intravenous, Q4H PRN, Kimberly Arias DO, 4 mg at 03/12/23 0820  •  oxyCODONE (ROXICODONE) IR tablet 2 5 mg, 2 5 mg, Oral, Q4H PRN, Kimberly Arias DO  •  oxyCODONE (ROXICODONE) IR tablet 5 mg, 5 mg, Oral, Q4H PRN, Kimberly Arias DO, 5 mg at 03/12/23 0932  •  pantoprazole (PROTONIX) EC tablet 40 mg, 40 mg, Oral, BID AC, Kimberly Arias DO, 40 mg at 03/12/23 7700  •  simethicone (MYLICON) chewable tablet 80 mg, 80 mg, Oral, 4x Daily (with meals and at bedtime), Kimberly Arias DO, 80 mg at 03/12/23 9099  •  sodium bicarbonate tablet 1,300 mg, 1,300 mg, Oral, TID after meals, Cheryl Menard MD, 1,300 mg at 03/12/23 0827  •  tamsulosin (FLOMAX) capsule 0 4 mg, 0 4 mg, Oral, Daily With Miguelina Leal DO, 0 4 mg at 03/11/23 1723    Laboratory Results:  Results from last 7 days   Lab Units 03/12/23  0546 03/11/23  1052 03/10/23  0647 03/09/23  0647   WBC Thousand/uL 20 27* 16 91* 16 09* 16 32*   HEMOGLOBIN g/dL 8 2* 7 1* 7 3* 7 4*   HEMATOCRIT % 26 3* 24 0* 23 4* 23 6*   PLATELETS Thousands/uL 365 345 350 357 SODIUM mmol/L 130* 130* 130* 131*   POTASSIUM mmol/L 4 2 4 1 4 2 4 1   CHLORIDE mmol/L 103 103 102 105   CO2 mmol/L 21 20* 18* 18*   BUN mg/dL 20 20 20 19   CREATININE mg/dL 1 45* 1 53* 1 38* 1 36*   CALCIUM mg/dL 8 1* 8 0* 8 2* 8 3   ALK PHOS U/L  --  947* 984* 972*   ALT U/L  --  13 15 16   AST U/L  --  70* 86* 87*

## 2023-03-12 NOTE — PROGRESS NOTES
Progress Note - Infectious Disease   Vic Mart 61 y o  male MRN: [id number]  Unit/Bed#: -01 Encounter: [id number]      Impression/Recommendations:  1  Intra-abdominal abscess  Patient is status post IR drain in early December, with culture growing ESBL producing E  coli  He completed 7-day course of IV ertapenem  Patient developed recurrent abscess, status post repeat IR drain in late December, with culture growing ESBL producing E  coli again  Patient completed 21 days of IV ertapenem on 1/7  Drains removed last month  Due to persistent leukocytosis, abdominal MRI was done, showing recurrent perihepatic collection  Patient remains afebrile and remains systemically well  Abdominal exam benign  Collection should be really drain  Ideally, I would have like to hold off on any empiric antibiotic, to allow better yield from culture  Unfortunately, with patient developing hypotension and tachycardia, will check blood cultures restart the biotic  Check blood cultures cultures  Restart ertapenem empirically  IR to drain intra-abdominal collection tomorrow  Monitor abdominal pain  Monitor temperature/WBC      2     Purulent drainage from old abdominal drain site  No evidence cellulitis clinically  Culture was sent  Serial exams  Follow-up on wound culture  3  Persistent leukocytosis, most likely secondary to redevelopment of intra-abdominal abscess, as in above  Monitor WBC      3  Coloenteric fistula      4   Metastatic colon cancer      5  CKD  Creatinine baseline      Hospitalization records reviewed in detail  Discussed with Dr Abilio Murphy from primary service earlier      Antibiotics:  None     Subjective:  Patient was seen earlier  He was comfortable, with minimal abdominal pain  Temperature stays down  No chills  During day, patient developed hypotension and tachycardia      Objective:  Vitals:  Temp:  [97 5 °F (36 4 °C)-98 8 °F (37 1 °C)] 98 3 °F (36 8 °C)  HR:  [] 107  Resp:  [18] 18  BP: ()/(60-86) 115/74  SpO2:  [93 %-97 %] 96 %  Temp (24hrs), Av °F (36 7 °C), Min:97 5 °F (36 4 °C), Max:98 8 °F (37 1 °C)  Current: Temperature: 98 3 °F (36 8 °C)    Physical Exam:     General: Awake, alert, cooperative, no distress  Neck:  Supple  No mass  No lymphadenopathy  Lungs: Expansion symmetric, no rales, no wheezing, respirations unlabored  Heart:  Regular rate and rhythm, S1 and S2 normal, no murmur  Abdomen: Soft, nondistended, non-tender  Purulent drainage from prior drain site earlier noted, without purulence at present  Extremities: No edema  No erythema/warmth  No ulcer  Nontender to palpation  Skin:  No rash  Neuro: Moves all extremities  Invasive Devices     Central Venous Catheter Line  Duration           Port A Cath 03/10/22 Right Chest 367 days          Drain  Duration           Ileostomy  days    Cholecystostomy Tube 16 days                Labs studies:   I have personally reviewed pertinent labs  Results from last 7 days   Lab Units 23  0546 23  1052 03/10/23  0647 23  0647   POTASSIUM mmol/L 4 2 4 1 4 2 4 1   CHLORIDE mmol/L 103 103 102 105   CO2 mmol/L 21 20* 18* 18*   BUN mg/dL 20 20 20 19   CREATININE mg/dL 1 45* 1 53* 1 38* 1 36*   EGFR ml/min/1 73sq m 52 49 55 56   CALCIUM mg/dL 8 1* 8 0* 8 2* 8 3   AST U/L  --  70* 86* 87*   ALT U/L  --  13 15 16   ALK PHOS U/L  --  947* 984* 972*     Results from last 7 days   Lab Units 23  0546 23  1052 03/10/23  0647   WBC Thousand/uL 20 27* 16 91* 16 09*   HEMOGLOBIN g/dL 8 2* 7 1* 7 3*   PLATELETS Thousands/uL 365 345 350     Results from last 7 days   Lab Units 23  1629   C DIFF TOXIN B BY PCR  Negative       Imaging Studies:   I have personally reviewed pertinent imaging study reports and images in PACS  EKG, Pathology, and Other Studies:   I have personally reviewed pertinent reports

## 2023-03-12 NOTE — PROGRESS NOTES
03/12/23 1230   Pain Assessment   Pain Assessment Tool 0-10   Pain Score No Pain   Restrictions/Precautions   Precautions Fall Risk;Fluid restriction;Contact/isolation   Subjective   Subjective pt reports feeling better today then yesterday , agreeable to perform skilled PT   Sit to Lying   Type of Assistance Needed Physical assistance   Physical Assistance Level 26%-50%   Comment assist with LE   Sit to Lying CARE Score 3   Lying to Sitting on Side of Bed   Type of Assistance Needed Incidental touching   Lying to Sitting on Side of Bed CARE Score 4   Sit to Stand   Type of Assistance Needed Physical assistance   Physical Assistance Level 51%-75%   Comment (S)  ModA SPT with RW only no walking in room   Sit to Stand CARE Score 2   Bed-Chair Transfer   Type of Assistance Needed Physical assistance   Physical Assistance Level 51%-75%   Comment (S)  SPT with RW to recliner or WC no walking d/t LBP   Chair/Bed-to-Chair Transfer CARE Score 2   Transfer Bed/Chair/Wheelchair   Adaptive Equipment Roller Walker   Walk 10 Feet   Type of Assistance Needed Physical assistance   Physical Assistance Level 51%-75%   Comment (S)  RW c/o lighted and dizziness falling back into chair  see BP vitals   Walk 10 Feet CARE Score 2   Ambulation   Does the patient walk? 2  Yes   Equipment Use   NuStep lvl 1 for 5 min LE   Assessment   Treatment Assessment pt recv'd unit of blood yesterday and pt reprots feeing better , pt also c/o lighted and dizziness during walking and STS  Pt  BP very low see vital below alayna during standing activities  Dr Tyron Busby recomm / no more PT session today just rest and resume on Monday 3 13  and cont to monitor SxS   Pt NPO after midnight today    Cont POC   Plan   Progress Slow progress, medical status limitations   PT Therapy Minutes   PT Time In 1230   PT Time Out 1330   PT Total Time (minutes) 60   PT Mode of treatment - Individual (minutes) 60   PT Mode of treatment - Concurrent (minutes) 0   PT Mode of treatment - Group (minutes) 0   PT Mode of treatment - Co-treat (minutes) 0   PT Mode of Treatment - Total time(minutes) 60 minutes   PT Cumulative Minutes 1325   Therapy Time missed   Time missed? Yes   Amount of time missed 30   Reason for time missed Medical hold;Illness; Extreme fatigue

## 2023-03-12 NOTE — QUICK NOTE
Contacted by nursing for patient being symptomatically orthostatic in therapies dropping down to 89/60 and getting tachy to 135  Therapies were held, and he was brought back to them room  He is feeling more tired  Sitting, he improved back to 115/74 with   Started on NS (IM started 75 cc/hr given his pleural effusion and hyponatremia)  I discussed with ID- will get blood cultures and start ertapenem  Discussed with nursing to get blood cultures x2 prior to starting ertapenem  Updated Nephro to let them know we are starting fluids  I discussed with the patient transferring back to the acute care hospital  He would like to hold off for now, but understands that if he deteriorates further, we will have to discharge him back urgently  He expressed agreement and understanding  He is AAOx3, and otherwise stable in bed now  I will reach out to [facility] to make them aware of the situation  Vital signs to Q4hr for closer monitoring      Rigoberto Siemens, MD  Physical Medicine and Rehabilitation

## 2023-03-12 NOTE — PLAN OF CARE
Reviewed    Problem: PAIN - ADULT  Goal: Verbalizes/displays adequate comfort level or baseline comfort level  Description: Interventions:  - Encourage patient to monitor pain and request assistance  - Assess pain using appropriate pain scale  - Administer analgesics based on type and severity of pain and evaluate response  - Implement non-pharmacological measures as appropriate and evaluate response  - Consider cultural and social influences on pain and pain management  - Notify physician/advanced practitioner if interventions unsuccessful or patient reports new pain  Outcome: Progressing     Problem: INFECTION - ADULT  Goal: Absence or prevention of progression during hospitalization  Description: INTERVENTIONS:  - Assess and monitor for signs and symptoms of infection  - Monitor lab/diagnostic results  - Monitor all insertion sites, i e  indwelling lines, tubes, and drains  - Monitor endotracheal if appropriate and nasal secretions for changes in amount and color  - [place] appropriate cooling/warming therapies per order  - Administer medications as ordered  - Instruct and encourage patient and family to use good hand hygiene technique  - Identify and instruct in appropriate isolation precautions for identified infection/condition  Outcome: Progressing  Goal: Absence of fever/infection during neutropenic period  Description: INTERVENTIONS:  - Monitor WBC    Outcome: Progressing     Problem: SAFETY ADULT  Goal: Patient will remain free of falls  Description: INTERVENTIONS:  - Educate patient/family on patient safety including physical limitations  - Instruct patient to call for assistance with activity   - Consult OT/PT to assist with strengthening/mobility   - Keep Call bell within reach  - Keep bed low and locked with side rails adjusted as appropriate  - Keep care items and personal belongings within reach  - Initiate and maintain comfort rounds  - Make Fall Risk Sign visible to staff  - Offer Toileting every 4 Hours, in advance of need  - Apply yellow socks and bracelet for high fall risk patients  - Consider moving patient to room near nurses station  Outcome: Progressing  Goal: Maintain or return to baseline ADL function  Description: INTERVENTIONS:  -  Assess patient's ability to carry out ADLs; assess patient's baseline for ADL function and identify physical deficits which impact ability to perform ADLs (bathing, care of mouth/teeth, toileting, grooming, dressing, etc )  - Assess/evaluate cause of self-care deficits   - Assess range of motion  - Assess patient's mobility; develop plan if impaired  - Assess patient's need for assistive devices and provide as appropriate  - Encourage maximum independence but intervene and supervise when necessary  - Involve family in performance of ADLs  - Assess for home care needs following discharge   - Consider OT consult to assist with ADL evaluation and planning for discharge  - Provide patient education as appropriate  Outcome: Progressing  Goal: Maintains/Returns to pre admission functional level  Description: INTERVENTIONS:  - Set and communicate daily mobility goal to care team and patient/family/caregiver     - Collaborate with rehabilitation services on mobility goals if consulted  - Out of bed for toileting  - Record patient progress and toleration of activity level   Outcome: Progressing     Problem: DISCHARGE PLANNING  Goal: Discharge to home or other facility with appropriate resources  Description: INTERVENTIONS:  - Identify barriers to discharge w/patient and caregiver  - Arrange for needed discharge resources and transportation as appropriate  - Identify discharge learning needs (meds, wound care, etc )  - Arrange for interpretive services to assist at discharge as needed  - Refer to Case Management Department for coordinating discharge planning if the patient needs post-hospital services based on physician/advanced practitioner order or complex needs related to functional status, cognitive ability, or social support system  Outcome: Progressing     Problem: Prexisting or High Potential for Compromised Skin Integrity  Goal: Skin integrity is maintained or improved  Description: INTERVENTIONS:  - Identify patients at risk for skin breakdown  - Assess and monitor skin integrity  - Assess and monitor nutrition and hydration status  - Monitor labs   - Assess for incontinence   - Turn and reposition patient  - Assist with mobility/ambulation  - Relieve pressure over bony prominences  - Avoid friction and shearing  - Provide appropriate hygiene as needed including keeping skin clean and dry  - Evaluate need for skin moisturizer/barrier cream  - Collaborate with interdisciplinary team   - Patient/family teaching  - Consider wound care consult   Outcome: Progressing     Problem: Nutrition/Hydration-ADULT  Goal: Nutrient/Hydration intake appropriate for improving, restoring or maintaining nutritional needs  Description: Monitor and assess patient's nutrition/hydration status for malnutrition  Collaborate with interdisciplinary team and initiate plan and interventions as ordered  Monitor patient's weight and dietary intake as ordered or per policy  Utilize nutrition screening tool and intervene as necessary  Determine patient's food preferences and provide high-protein, high-caloric foods as appropriate       INTERVENTIONS:  - Monitor oral intake, urinary output, labs, and treatment plans  - Assess nutrition and hydration status and recommend course of action  - Evaluate amount of meals eaten  - Assist patient with eating if necessary   - Allow adequate time for meals  - Recommend/ encourage appropriate diets, oral nutritional supplements, and vitamin/mineral supplements  - Order, calculate, and assess calorie counts as needed  - Recommend, monitor, and adjust tube feedings and TPN/PPN based on assessed needs  - Assess need for intravenous fluids  - Provide specific nutrition/hydration education as appropriate  - Include patient/family/caregiver in decisions related to nutrition  Outcome: Progressing     Problem: MOBILITY - ADULT  Goal: Maintain or return to baseline ADL function  Description: INTERVENTIONS:  -  Assess patient's ability to carry out ADLs; assess patient's baseline for ADL function and identify physical deficits which impact ability to perform ADLs (bathing, care of mouth/teeth, toileting, grooming, dressing, etc )  - Assess/evaluate cause of self-care deficits   - Assess range of motion  - Assess patient's mobility; develop plan if impaired  - Assess patient's need for assistive devices and provide as appropriate  - Encourage maximum independence but intervene and supervise when necessary  - Involve family in performance of ADLs  - Assess for home care needs following discharge   - Consider OT consult to assist with ADL evaluation and planning for discharge  - Provide patient education as appropriate  Outcome: Progressing  Goal: Maintains/Returns to pre admission functional level  Description: INTERVENTIONS:  - Set and communicate daily mobility goal to care team and patient/family/caregiver     - Collaborate with rehabilitation services on mobility goals if consulted  - Out of bed for toileting  - Record patient progress and toleration of activity level   Outcome: Progressing

## 2023-03-12 NOTE — PROGRESS NOTES
Physical Medicine and Rehabilitation Progress Note  Lorri Luciano 61 y o  male MRN: [id number]  Unit/Bed#: -09 Encounter: [id number]    Chief Complaint:  Feeling less tired today  With purulent drainage from where anterior drain site used to be with expression  Interval History: No acute events overnight  Responded appropriately to transfusion  Stable  He hasn't had any new fevers, chills  No new N/V, abdominal pain, CP, SOB  Ostomy functioning  Discussed with IM on call - when they examined patient they noticed purulent drainage from previous anterior drain site they were able to express  They reached out to ID, who recommended that we continue to observe until off antibiotics as he will be getting it drained tomorrow with a better wound culture  IM did get a wound culture today  ID told IM that if he were to clinically deteriorate could restart abx  Patient asked today if he could be seen by Palliative care to discuss his medications, symptom management, overall goals of care and what he needs to do to prevent readmission in the future  He had trouble sleeping last night  Assessment/Plan - Continue plan of care as per primary attending, unless otherwise stated below:      · SIRS vs  Sepsis:  Clinically stable appearing and non-toxic  Abscess larger on imaging - suspect this is the source  WBC increased today, remains afebrile and feeling stronger today  · Follow-up wound culture today  · Continue to hold on abx as per ID  · If he clinically deteriorates can restart IV ertapenem  · Confirmed NPO for tonight with plan for drainage tomorrow  · Lovenox held  · 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: Perc radha drain  Repeat tube check on 3/7 without evidence of fistula  Tube is capped  · Perihepatic Abscess: MRCP showed enlarged abscess cavity since drain removed  ID aware, Surg Onc aware  Consulted IR for evaluation  · See SIRS/Sepsis  · Drain tomorrow with IR  · Metastatic Colon Adenocarcinoma s/p resection, chemo, recent hepatic resection and ablation, and transverse colon resection with ileostomy: Has mets to liver, possibly lung  Chronically elevated LFTs  Repeat CMP pending  · Reached out to palliative who will see patient to discuss goals of care at his request  Added consult  · Bilateral R > K pleural effusions  R increasing in size: Asymptomatic at this time  Surg Onc brought up possibly draining pleural effusion for diagnostic reasons  Will add to IR consult for this and labs  · Hyponatremia 2/2 volume status (per Nephro) +/- SIADH: On fluid restriction  NA stable on 3/12  · ALEXY on CKD3 (baseline 1 2-1 4): Labs for today pending  Nephro following  Felt to be likely 2/2 prerenal azotemia with improvement with IVF stopped on 3/9  · Anemia: Recheck CBC today  Has consent in chart 3/4  Hgb 8 6 today with improvement in symptoms after 1 unit pRBC  Monitor  · Trouble sleeping: Melatonin increased   · DVT PPx: Lovenox held  · Pain: Continue current regimen  · Bowel/Bladder Ileostomy output stable  Voiding and continent  Total time spent:  50 minutes, with more than 50% spent counseling/coordinating care  Counseling includes discussion with patient re: progress in therapies, functional issues observed by therapy staff, and discussion with patient his/her current medical state/wellbeing  Coordination of patient's care was performed in conjunction with Internal Medicine service to monitor patient's labs, vitals, and management of their comorbidities  Farhat Morales MD  Physical Medicine and Rehabilitation      Review of Systems: A 10 point review of systems was negative except for what is noted in the HPI          Current Facility-Administered Medications:   •  acetaminophen (TYLENOL) tablet 650 mg, 650 mg, Oral, Q4H PRN, Tavo Castro, , 650 mg at 03/01/23 3759  •  alteplase (CATHFLO) injection 2 mg, 2 mg, Intracatheter, Once, GAURANG Roque  • aspirin chewable tablet 81 mg, 81 mg, Oral, Daily, Gualberto Spanner, DO, 81 mg at 03/12/23 2187  •  atorvastatin (LIPITOR) tablet 40 mg, 40 mg, Oral, Daily, Gualberto Spanner, DO, 40 mg at 03/12/23 3009  •  calcium carbonate (TUMS) chewable tablet 500 mg, 500 mg, Oral, TID PRN, GAURANG Keith, 500 mg at 03/01/23 1715  •  collagenase (SANTYL) ointment, , Topical, Daily, Gualberto Spanner, DO, Given at 03/11/23 1230  •  DULoxetine (CYMBALTA) delayed release capsule 30 mg, 30 mg, Oral, Daily, Gualberto Spanner, DO, 30 mg at 03/12/23 6881  •  hydrOXYzine HCL (ATARAX) tablet 10 mg, 10 mg, Oral, HS, Gualberto Spanner, DO, 10 mg at 03/11/23 2144  •  insulin lispro (HumaLOG) 100 units/mL subcutaneous injection 1-6 Units, 1-6 Units, Subcutaneous, TID AC, 1 Units at 03/11/23 1202 **AND** Fingerstick Glucose (POCT), , , 4x Daily AC and at bedtime, Gualberto Spanner, DO  •  insulin lispro (HumaLOG) 100 units/mL subcutaneous injection 1-6 Units, 1-6 Units, Subcutaneous, HS, Gualberto Spanner, DO, 1 Units at 03/06/23 2147  •  melatonin tablet 6 mg, 6 mg, Oral, HS, GAURANG Hernandez  •  methocarbamol (ROBAXIN) tablet 500 mg, 500 mg, Oral, BID PRN, Gualberto Spanner, DO, 500 mg at 03/03/23 8245  •  midodrine (PROAMATINE) tablet 5 mg, 5 mg, Oral, TID AC, GAURANG Salas, 5 mg at 03/12/23 0713  •  ondansetron (ZOFRAN) injection 4 mg, 4 mg, Intravenous, Q4H PRN, Gualberto Spanner, DO, 4 mg at 03/12/23 0820  •  oxyCODONE (ROXICODONE) IR tablet 2 5 mg, 2 5 mg, Oral, Q4H PRN, Gualberto Spanner, DO  •  oxyCODONE (ROXICODONE) IR tablet 5 mg, 5 mg, Oral, Q4H PRN, Gualberto Bhat DO, 5 mg at 03/12/23 0932  •  pantoprazole (PROTONIX) EC tablet 40 mg, 40 mg, Oral, BID AC, Gualberto Bhat DO, 40 mg at 03/12/23 0735  •  simethicone (MYLICON) chewable tablet 80 mg, 80 mg, Oral, 4x Daily (with meals and at bedtime), Gualberto Bhat DO, 80 mg at 03/12/23 8404  •  sodium bicarbonate tablet 1,300 mg, 1,300 mg, Oral, TID after meals, Lindsey Jensen MD, 1,300 mg at 03/12/23 0827  •  tamsulosin (FLOMAX) capsule 0 4 mg, 0 4 mg, Oral, Daily With Dinner, Dave Flow, DO, 0 4 mg at 03/11/23 1723    Physical Exam:  Temp:  [97 5 °F (36 4 °C)-98 8 °F (37 1 °C)] 98 8 °F (37 1 °C)  HR:  [] 90  Resp:  [18-20] 18  BP: ()/(60-86) 125/60  SpO2:  [93 %-97 %] 93 %    Gen: No acute distress, sitting edge of bed changing his ostomy bag  HEENT: Moist mucus membranes, Normocephalic/Atraumatic  Cardiovascular: Regular rate, rhythm, S1/S2  Distal pulses palpable  Heme/Extr: No edema  Pulmonary: Non-labored breathing  : No rivero  GI: Soft, non-tender, non-distended  Purulent drainage from previous drain site with expression  MSK: ROM is WFL in all extremities  Neuro: AAOx3,  Speech is intact  Appropriate to questioning  Psych: Normal mood and affect  Laboratory:  Labs reviewed  Results from last 7 days   Lab Units 03/12/23  0546 03/11/23  1052 03/10/23  0647   HEMOGLOBIN g/dL 8 2* 7 1* 7 3*   HEMATOCRIT % 26 3* 24 0* 23 4*   WBC Thousand/uL 20 27* 16 91* 16 09*     Results from last 7 days   Lab Units 03/12/23  0546 03/11/23  1052 03/10/23  0647 03/09/23  0647   BUN mg/dL 20 20 20 19   SODIUM mmol/L 130* 130* 130* 131*   POTASSIUM mmol/L 4 2 4 1 4 2 4 1   CHLORIDE mmol/L 103 103 102 105   CREATININE mg/dL 1 45* 1 53* 1 38* 1 36*   AST U/L  --  70* 86* 87*   ALT U/L  --  13 15 16     Results from last 7 days   Lab Units 03/11/23  1340   PROTIME seconds 17 7*   INR  1 43*        Diagnostic Studies: Reviewed   MRI abdomen wo contrast and mrcp   Final Result by Anusha Mixon MD (03/10 2259)      No choledocholithiasis  Increased size of T2 hyperintense focus at the left hepatic lobe now measuring 5 1 cm consistent with enlarging abscess cavity following recent removal of drainage catheter  Innumerable hepatic metastatic lesions again seen  Increase in size of large right pleural effusion  Small left pleural effusion                  Workstation performed: [id number]         IR cholecystostomy tube check/change/reposition/reinsertion/upsize   Final Result by Krystal Briggs MD (03/10 1414)   Impression: Resolution of coloenteric fistula  Patent cystic and common bile duct, filling of contrast into the duodenum briskly  Tube capped, will return in 2 weeks for possible tube removal depending on input from surgeons  Continue to flush with 10    mL normal saline daily  Signed, performed, and dictated by GAURANG Graff under the supervision of Krystal Briggs  Workstation performed: [id number]         IR IN-Patient Thoracentesis    (Results Pending)   IR drainage tube placement    (Results Pending)         ** Please Note: Fluency Direct voice to text software may have been used in the creation of this document   **

## 2023-03-12 NOTE — PROGRESS NOTES
OT Treatment Note       03/12/23 1000   Pain Assessment   Pain Assessment Tool 0-10   Pain Score 4   Pain Location/Orientation Orientation: Right;Location: Abdomen  (at R drain site)   Hospital Pain Intervention(s) Repositioned; Rest  (pt reported receiving pain meds prior to session)   Restrictions/Precautions   Precautions Fall Risk;Fluid restriction;Contact/isolation   Weight Bearing Restrictions No   ROM Restrictions No   Braces or Orthoses   (B/L knee high TEDs)   Lifestyle   Autonomy "That was the worst it's been," referring to dizziness during SPT to [employer]   Oral Hygiene   Type of Assistance Needed Set-up / clean-up   Physical Assistance Level No physical assistance   Comment declined completing at sink, completing sitting up in bed with setup   Oral Hygiene CARE Score 5   Grooming   Findings brushing hair seated EOB with setup   Shower/Bathe Self   Comment pt completing UB spongebathing seated EOB with setup, declining washing BLE/groin/buttocks this morning   Tub/Shower Transfer   Reason Not Assessed Sponge Bath   Upper Body Dressing   Type of Assistance Needed Set-up / clean-up   Physical Assistance Level No physical assistance   Comment setup to don tshirt seated EOB   Upper Body Dressing CARE Score 5   Lower Body Dressing   Comment declined completing, reporting changing pants late last night   Putting On/Taking Off Footwear   Type of Assistance Needed Physical assistance   Physical Assistance Level 76% or more   Comment TA for TEDs, able to don L sock with modA using crossed leg technique, requiring TA to don R sock 2* pain   Putting On/Taking Off Footwear CARE Score 2   Sit to Lying   Type of Assistance Needed Independent   Physical Assistance Level No physical assistance   Sit to Lying CARE Score 6   Lying to Sitting on Side of Bed   Type of Assistance Needed Independent   Physical Assistance Level No physical assistance   Comment HOB elevated, using bedrail   Lying to Sitting on Side of Bed CARE Score 6 Sit to Stand   Type of Assistance Needed Supervision   Physical Assistance Level No physical assistance   Comment using RW; SUP 2* dizziness in both sitting and standing   Sit to Stand CARE Score 4   Bed-Chair Transfer   Type of Assistance Needed Supervision   Physical Assistance Level No physical assistance   Comment SUP SPT EOB<->WC, SUP 2* dizziness in sitting and standing  Dizziness in sitting 5/10, with standing and turning to chair increasing to 7/10  Resolving with seated rest   Chair/Bed-to-Chair Transfer CARE Score 4   Cognition   Overall Cognitive Status WFL   Arousal/Participation Alert; Cooperative   Attention Within functional limits   Orientation Level Oriented X4   Memory Within functional limits   Following Commands Follows all commands and directions without difficulty   Activity Tolerance   Activity Tolerance Treatment limited secondary to medical complications (Comment)   Assessment   Treatment Assessment Pt seen for 90 min OT session focusing on ADL routine  Pt visibly fatigued today, but willing to participate  Pt reporting dizziness in both sitting (5/10 dizziness) and standing (7/10 dizziness) during session  Vitals WFL despite pt being symptomatic  RN aware  Pt completing ADL routine at setup/SUP level today  Following SPT, OT noting abdominal drainage through shirt through top L side of ABD pad  RN made aware, pt transferred back to bed to further assess and complete ostomy care  OT to continue POC as able to focus on activity tolerance, standing tolerance/balance, and functional strength to maximize functional (I) and safety  Prognosis Fair   Problem List Decreased strength;Decreased endurance; Impaired balance;Decreased mobility;Pain   Barriers to Discharge Inaccessible home environment;Decreased caregiver support   Plan   Treatment/Interventions ADL retraining;Functional transfer training; Therapeutic exercise; Endurance training;Patient/family training;Equipment eval/education; Bed mobility;Gait training; Compensatory technique education;Spoke to nursing   Progress Progressing toward goals   Recommendation   OT Discharge Recommendation   (pending progress)   OT Therapy Minutes   OT Time In 1000   OT Time Out 1130   OT Total Time (minutes) 90   OT Mode of treatment - Individual (minutes) 90   OT Mode of treatment - Concurrent (minutes) 0   OT Mode of treatment - Group (minutes) 0   OT Mode of treatment - Co-treat (minutes) 0   OT Mode of Treatment - Total time(minutes) 90 minutes   OT Cumulative Minutes 1020   Therapy Time missed   Time missed?  No

## 2023-03-12 NOTE — PLAN OF CARE
Problem: PAIN - ADULT  Goal: Verbalizes/displays adequate comfort level or baseline comfort level  Description: Interventions:  - Encourage patient to monitor pain and request assistance  - Assess pain using appropriate pain scale  - Administer analgesics based on type and severity of pain and evaluate response  - Implement non-pharmacological measures as appropriate and evaluate response  - Consider cultural and social influences on pain and pain management  - Notify physician/advanced practitioner if interventions unsuccessful or patient reports new pain  Outcome: Progressing     Problem: INFECTION - ADULT  Goal: Absence or prevention of progression during hospitalization  Description: INTERVENTIONS:  - Assess and monitor for signs and symptoms of infection  - Monitor lab/diagnostic results  - Monitor all insertion sites, i e  indwelling lines, tubes, and drains  - Monitor endotracheal if appropriate and nasal secretions for changes in amount and color  - Merlin appropriate cooling/warming therapies per order  - Administer medications as ordered  - Instruct and encourage patient and family to use good hand hygiene technique  - Identify and instruct in appropriate isolation precautions for identified infection/condition  Outcome: Progressing  Goal: Absence of fever/infection during neutropenic period  Description: INTERVENTIONS:  - Monitor WBC    Outcome: Progressing     Problem: SAFETY ADULT  Goal: Patient will remain free of falls  Description: INTERVENTIONS:  - Educate patient/family on patient safety including physical limitations  - Instruct patient to call for assistance with activity   - Consult OT/PT to assist with strengthening/mobility   - Keep Call bell within reach  - Keep bed low and locked with side rails adjusted as appropriate  - Keep care items and personal belongings within reach  - Initiate and maintain comfort rounds  - Make Fall Risk Sign visible to staff  - Offer Toileting every 2 Hours, in advance of need  - Initiate/Maintain bed alarm  - Obtain necessary fall risk management equipment: bed alarm  - Apply yellow socks and bracelet for high fall risk patients  - Consider moving patient to room near nurses station  Outcome: Progressing  Goal: Maintain or return to baseline ADL function  Description: INTERVENTIONS:  -  Assess patient's ability to carry out ADLs; assess patient's baseline for ADL function and identify physical deficits which impact ability to perform ADLs (bathing, care of mouth/teeth, toileting, grooming, dressing, etc )  - Assess/evaluate cause of self-care deficits   - Assess range of motion  - Assess patient's mobility; develop plan if impaired  - Assess patient's need for assistive devices and provide as appropriate  - Encourage maximum independence but intervene and supervise when necessary  - Involve family in performance of ADLs  - Assess for home care needs following discharge   - Consider OT consult to assist with ADL evaluation and planning for discharge  - Provide patient education as appropriate  Outcome: Progressing  Goal: Maintains/Returns to pre admission functional level  Description: INTERVENTIONS:  - Perform BMAT or MOVE assessment daily    - Set and communicate daily mobility goal to care team and patient/family/caregiver  - Collaborate with rehabilitation services on mobility goals if consulted  - Perform Range of Motion 3 times a day  - Reposition patient every 2 hours    - Dangle patient 3 times a day  - Stand patient 3 times a day  - Ambulate patient 3 times a day  - Out of bed to chair 3 times a day   - Out of bed for meals 3 times a day  - Out of bed for toileting  - Record patient progress and toleration of activity level   Outcome: Progressing     Problem: DISCHARGE PLANNING  Goal: Discharge to home or other facility with appropriate resources  Description: INTERVENTIONS:  - Identify barriers to discharge w/patient and caregiver  - Arrange for needed discharge resources and transportation as appropriate  - Identify discharge learning needs (meds, wound care, etc )  - Arrange for interpretive services to assist at discharge as needed  - Refer to Case Management Department for coordinating discharge planning if the patient needs post-hospital services based on physician/advanced practitioner order or complex needs related to functional status, cognitive ability, or social support system  Outcome: Progressing     Problem: Prexisting or High Potential for Compromised Skin Integrity  Goal: Skin integrity is maintained or improved  Description: INTERVENTIONS:  - Identify patients at risk for skin breakdown  - Assess and monitor skin integrity  - Assess and monitor nutrition and hydration status  - Monitor labs   - Assess for incontinence   - Turn and reposition patient  - Assist with mobility/ambulation  - Relieve pressure over bony prominences  - Avoid friction and shearing  - Provide appropriate hygiene as needed including keeping skin clean and dry  - Evaluate need for skin moisturizer/barrier cream  - Collaborate with interdisciplinary team   - Patient/family teaching  - Consider wound care consult   Outcome: Progressing     Problem: Nutrition/Hydration-ADULT  Goal: Nutrient/Hydration intake appropriate for improving, restoring or maintaining nutritional needs  Description: Monitor and assess patient's nutrition/hydration status for malnutrition  Collaborate with interdisciplinary team and initiate plan and interventions as ordered  Monitor patient's weight and dietary intake as ordered or per policy  Utilize nutrition screening tool and intervene as necessary  Determine patient's food preferences and provide high-protein, high-caloric foods as appropriate       INTERVENTIONS:  - Monitor oral intake, urinary output, labs, and treatment plans  - Assess nutrition and hydration status and recommend course of action  - Evaluate amount of meals eaten  - Assist patient with eating if necessary   - Allow adequate time for meals  - Recommend/ encourage appropriate diets, oral nutritional supplements, and vitamin/mineral supplements  - Order, calculate, and assess calorie counts as needed  - Recommend, monitor, and adjust tube feedings and TPN/PPN based on assessed needs  - Assess need for intravenous fluids  - Provide specific nutrition/hydration education as appropriate  - Include patient/family/caregiver in decisions related to nutrition  Outcome: Progressing     Problem: MOBILITY - ADULT  Goal: Maintain or return to baseline ADL function  Description: INTERVENTIONS:  -  Assess patient's ability to carry out ADLs; assess patient's baseline for ADL function and identify physical deficits which impact ability to perform ADLs (bathing, care of mouth/teeth, toileting, grooming, dressing, etc )  - Assess/evaluate cause of self-care deficits   - Assess range of motion  - Assess patient's mobility; develop plan if impaired  - Assess patient's need for assistive devices and provide as appropriate  - Encourage maximum independence but intervene and supervise when necessary  - Involve family in performance of ADLs  - Assess for home care needs following discharge   - Consider OT consult to assist with ADL evaluation and planning for discharge  - Provide patient education as appropriate  Outcome: Progressing  Goal: Maintains/Returns to pre admission functional level  Description: INTERVENTIONS:  - Perform BMAT or MOVE assessment daily    - Set and communicate daily mobility goal to care team and patient/family/caregiver  - Collaborate with rehabilitation services on mobility goals if consulted  - Perform Range of Motion 3 times a day  - Reposition patient every 2 hours    - Dangle patient 3 times a day  - Stand patient 3 times a day  - Ambulate patient 3 times a day  - Out of bed to chair 3 times a day   - Out of bed for meals 3 times a day  - Out of bed for toileting  - Record patient progress and toleration of activity level   Outcome: Progressing

## 2023-03-12 NOTE — PROGRESS NOTES
OT Treatment Note       03/12/23 1000   Pain Assessment   Pain Assessment Tool 0-10   Pain Score 4   Pain Location/Orientation Orientation: Right;Location: Abdomen  (at drain site)   Hospital Pain Intervention(s) Repositioned; Rest  (pt reported receiving pain meds prior to session)   Restrictions/Precautions   Precautions Fall Risk;Fluid restriction;Contact/isolation   Weight Bearing Restrictions No   ROM Restrictions No   Braces or Orthoses   (B/L knee high TEDs)   Lifestyle   Autonomy "That was the worst it's been," referring to dizziness during SPT to [facility]   Oral Hygiene   Type of Assistance Needed Set-up / clean-up   Physical Assistance Level No physical assistance   Comment declined completing at sink, completing sitting up in bed with setup   Oral Hygiene CARE Score 5   Grooming   Findings brushing hair seated EOB with setup   Shower/Bathe Self   Comment pt completing UB spongebathing seated EOB with setup, declining washing BLE/groin/buttocks this morning   Tub/Shower Transfer   Reason Not Assessed Sponge Bath   Upper Body Dressing   Type of Assistance Needed Set-up / clean-up   Physical Assistance Level No physical assistance   Comment setup to don tshirt seated EOB   Upper Body Dressing CARE Score 5   Lower Body Dressing   Comment declined completing, reporting changing pants late last night   Putting On/Taking Off Footwear   Type of Assistance Needed Physical assistance   Physical Assistance Level 76% or more   Comment TA for TEDs, able to don L sock with modA using crossed leg technique, requiring TA to don R sock 2* pain   Putting On/Taking Off Footwear CARE Score 2   Sit to Lying   Type of Assistance Needed Independent   Physical Assistance Level No physical assistance   Sit to Lying CARE Score 6   Lying to Sitting on Side of Bed   Type of Assistance Needed Independent   Physical Assistance Level No physical assistance   Comment HOB elevated, using bedrail   Lying to Sitting on Side of Bed CARE Score 6 Sit to Stand   Type of Assistance Needed Incidental touching   Physical Assistance Level No physical assistance   Comment using RW; CGA 2* dizziness in both sitting and standing   Sit to Stand CARE Score 4   Bed-Chair Transfer   Type of Assistance Needed Incidental touching   Physical Assistance Level No physical assistance   Comment CGA SPT EOB<->WC, SUP 2* dizziness in sitting and standing  Dizziness in sitting 5/10, with standing and turning to chair increasing to 7/10  Resolving with seated rest   Chair/Bed-to-Chair Transfer CARE Score 4   Cognition   Overall Cognitive Status WFL   Arousal/Participation Alert; Cooperative   Attention Within functional limits   Orientation Level Oriented X4   Memory Within functional limits   Following Commands Follows all commands and directions without difficulty   Activity Tolerance   Activity Tolerance Treatment limited secondary to medical complications (Comment)   Assessment   Treatment Assessment Pt seen for 90 min OT session focusing on ADL routine  Pt visibly fatigued today, but willing to participate  Pt reporting dizziness in both sitting (5/10 dizziness) and standing (7/10 dizziness) during session  Vitals WFL despite pt being symptomatic  RN aware  Pt completing ADL routine at setup/SUP level today and functional SPT with CGA using RW 2* dizziness  Following SPT, OT noting abdominal drainage through shirt through top of ABD pad  RN made aware, pt transferred back to bed for RN to further assess and complete ostomy care  OT to continue POC as able to focus on activity tolerance, standing tolerance/balance, and functional strength to maximize functional (I) and safety  Prognosis Fair   Problem List Decreased strength;Decreased endurance; Impaired balance;Decreased mobility;Pain   Barriers to Discharge Inaccessible home environment;Decreased caregiver support   Plan   Treatment/Interventions ADL retraining;Functional transfer training; Therapeutic exercise; Endurance training;Patient/family training;Equipment eval/education; Bed mobility;Gait training; Compensatory technique education;Spoke to nursing   Progress Progressing toward goals   Recommendation   OT Discharge Recommendation   (pending progress)   OT Therapy Minutes   OT Time In 1000   OT Time Out 1130   OT Total Time (minutes) 90   OT Mode of treatment - Individual (minutes) 90   OT Mode of treatment - Concurrent (minutes) 0   OT Mode of treatment - Group (minutes) 0   OT Mode of treatment - Co-treat (minutes) 0   OT Mode of Treatment - Total time(minutes) 90 minutes   OT Cumulative Minutes 1020   Therapy Time missed   Time missed?  No

## 2023-03-12 NOTE — PROGRESS NOTES
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  • s/p diverting loop colostomy/liver biopsy with subsequent chemotherapy 2/2022  • s/p hepatic resection and reversal of colostomy on 11/7/22  • s/p ex-lap with bowel resection/VAC placement 11/16/22  • s/p right hemicolectomy with partial descending colectomy, colocolonic anastomosis with loop ileostomy and mid line abdominal VAC placement 11/17  • s/p additional drains placed for a perisplenic abscess and splenic abscess 12/20/22 (then had 3 drains plus the already existing perc radha tube)  • The 2 left lateral drains were removed by IR 1/31/23 as an OP  • Had previously been tx'd with Ertapenem  • This hospital stay had fever/leukocytosis/sepsis, started Meropenem and then to Vancomycin and Ertapenem  • Wound cx again = Ecoli ESBL  • LD Ertapenem 2/14, Vancomycin 2/16  • To IR 2/24 = Abscessogram of midline drain showed minimal persistent collection and the midline catheter was removed  • Recent wound debridement by general surgery prior to transfer = continue Santyl qd to wound  • Requesting palliative care consult  • Has chr elevated Alk phos      Acute cholecystitis  • s/p percutaneous tube placement 12/8/22  • CT scan was concerning for cholecystitis --> to IR 2/24 = showed retraction of the radha tube and interval occlusion of the cystic duct   +Cholecysto enteric fistula   Radha tube was exchanged   Surgery saw 2/27/23 = stable  • Drainage from perc radha tube milky light green (20ml on 3/4/23 and nothing recorded from 3/5 or 3/6 although he says they did dump some from it on 3/5)  • Perc radha tube check 3/7/23 --> slight adjustment of tube further into GB body and tube is capped = may be removed if pt passes capping trial in 2 weeks and if surgery is in agreement --> [facility] Petrona Foss 3/7/23 and he was OK with that plan    • 3/10 MRCP= increased abscess 5 1 cm, innumerable lesions, increased R pleural effusion  • IR consulted for possible drain placement and thoracentisis     PORT catheter  • On 2/24/23 in IR = port check showed probable small thrombus at tip of cathter, flushed/improved aspiration of the port     HTN/orthostasis/tachycardia  • D/c'd Norvasc 2 5mg qd since BPs were too low to get  • Continue TEDS  • HRs are now in 80s to 90s after hydration    • TSH on 3/3/23 = 1 35  • Started Midodrine 2 5mg BID on 3/2/23 (give at 0600, 1100)   •  BP improving    Increased ileostomy drainage  • Had not been an issue prior  • cdiff negative 3/8/23     Diabetes mellitus  • Home:  Lantus 8U qam/Lispro 4U TID  • Here:  cont sliding scale only   • Has a DEXCOM meter and a regular meter at home  • Continue DM diet, QID Accuchecks/SSI  • Does not want lantus and standing insulin     CKD III  • Baseline 1 2-1 4  • Creat was 2 5 on admission  • Creatinine 1 4 this am  • Renal following     Metabolic acidosis  • Renal managing  • Bmp in am     Anemia  • Transfused in December and January  • Hemoglobin was 7 1 on 3/12  • Improved at 8 2 s/p infusion     Situational depression  • Cymbalta      Left pleural effusion  • Thoracentesis 1/12 for 300ml  • Cyto from pleural fluid was negative for malignancy; cx = NG  • CT 2/22/23 = "Interval worsening of moderate right and small left pleural effusions"  • MRI shows large rught pleural effusion     Hyponatremia  • Felt likely 2/2 SIADH in setting of malignancy  • NACL tabs were stopped per renal 3/9/23 and changed to sodium bicarbonate tabs to tx metabolic acidosis  • Will await renal input for now  • Did not receive IVF yesterday as ordered per renal request     Malnutrition  • Prefers premier protein banana flavored shake --> wife brought in   One bottle has 30 Gm protein and only 4 carbs  • Intake fair at best     Leukocytosis  • WBC up to 20  • ID following will await further input  • afebrile        Discharge date: Steven Neither    The above assessment and plan was reviewed and updated as determined by my evaluation of the patient on 3/12/2023      Labs:   Results from last 7 days   Lab Units 03/12/23  0546 03/11/23  1052   WBC Thousand/uL 20 27* 16 91*   HEMOGLOBIN g/dL 8 2* 7 1*   HEMATOCRIT % 26 3* 24 0*   PLATELETS Thousands/uL 365 345     Results from last 7 days   Lab Units 03/12/23  0546 03/11/23  1052   SODIUM mmol/L 130* 130*   POTASSIUM mmol/L 4 2 4 1   CHLORIDE mmol/L 103 103   CO2 mmol/L 21 20*   BUN mg/dL 20 20   CREATININE mg/dL 1 45* 1 53*   CALCIUM mg/dL 8 1* 8 0*         Results from last 7 days   Lab Units 03/11/23  1340   INR  1 43*     Results from last 7 days   Lab Units 03/12/23  0638 03/11/23 2053 03/11/23  1539   POC GLUCOSE mg/dl 128 136 136       Review of Scheduled Meds:  Current Facility-Administered Medications   Medication Dose Route Frequency Provider Last Rate   • acetaminophen  650 mg Oral Q4H PRN Marquita Burundian, DO     • alteplase  2 mg Intracatheter Once GAURANG Acosta     • aspirin  81 mg Oral Daily Marquita Burundian, DO     • atorvastatin  40 mg Oral Daily Marquita Burundian, DO     • calcium carbonate  500 mg Oral TID PRN GAURANG Zambrano     • collagenase   Topical Daily Marquita Burundian, DO     • DULoxetine  30 mg Oral Daily Marquita Burundian, DO     • hydrOXYzine HCL  10 mg Oral HS Marquita Burundian, DO     • insulin lispro  1-6 Units Subcutaneous TID AC Marquita Burundian, DO     • insulin lispro  1-6 Units Subcutaneous HS Marquita Burundian, DO     • melatonin  6 mg Oral HS GAURANG Acosta     • methocarbamol  500 mg Oral BID PRN Marquita Burundian, DO     • midodrine  5 mg Oral TID AC GAURANG Trotter     • ondansetron  4 mg Intravenous Q4H PRN Marquita Burundian, DO     • oxyCODONE  2 5 mg Oral Q4H PRN Marquita Harrell DO     • oxyCODONE  5 mg Oral Q4H PRN Hair Louder, DO     • pantoprazole  40 mg Oral BID AC [place] Louder, DO     • simethicone  80 mg Oral 4x Daily (with meals and at bedtime) [place] Louder, DO     • sodium bicarbonate  1,300 mg Oral TID after meals Parris Vigil MD     • tamsulosin  0 4 mg Oral Daily With [address] [place] [address], DO         Subjective/ HPI: Patient seen and examined  Patients overnight issues or events were reviewed with nursing or staff during rounds or morning huddle session  New or overnight issues include the following:     Pt  C/o being fatigued, BS stable so far without lantus  States he is not sleeping and requesting standing melatonin, he is also requesting palliative care input in light of recent testing       ROS:   A 10 point ROS was performed; negative except as noted above  Imaging:     MRI abdomen wo contrast and mrcp   Final Result by Jolie Irvin MD (03/10 1637)      No choledocholithiasis  Increased size of T2 hyperintense focus at the left hepatic lobe now measuring 5 1 cm consistent with enlarging abscess cavity following recent removal of drainage catheter  Innumerable hepatic metastatic lesions again seen  Increase in size of large right pleural effusion  Small left pleural effusion                  Workstation performed: [id number]         IR cholecystostomy tube check/change/reposition/reinsertion/upsize   Final Result by Lukas Doyle MD (03/10 4751)   Impression: Resolution of coloenteric fistula  Patent cystic and common bile duct, filling of contrast into the duodenum briskly  Tube capped, will return in 2 weeks for possible tube removal depending on input from surgeons  Continue to flush with 10    mL normal saline daily  Signed, performed, and dictated by GAURANG Randall under the supervision of Lukas Doyle        Workstation performed: [id number]         IR IN-Patient Thoracentesis    (Results Pending)   IR drainage tube placement    (Results Pending)       *Labs /Radiology studies reviewed  *Medications reviewed and reconciled as needed  *Please refer to order section for additional ordered labs studies  *Case discussed with primary attending during morning huddle case rounds    Physical Examination:  Vitals:   Vitals:    03/11/23 1549 03/11/23 1925 03/11/23 2118 03/12/23 0500   BP: 129/80 142/86 139/79 125/60   BP Location:   Left arm Left arm   Pulse: 85 86 90 90   Resp: 18 18 18 18   Temp: 98 2 °F (36 8 °C) 97 5 °F (36 4 °C) 97 5 °F (36 4 °C) 98 8 °F (37 1 °C)   TempSrc: Oral  Oral Oral   SpO2: 96%  97% 93%   Weight:       Height:           GEN: No apparent distress, more awake this am  NEURO: Alert and oriented x3  HEENT: Pupils are equal and reactive, EOMI, mucous membranes are moist, face symmetrical  CV: S1 S2 regular, no MRG, no peripheral edema noted  RESP: Lungs are clear bilaterally, no wheezes, rales or rhonchi noted, on room air, respirations easy and non labored  GI: Flat, RUQ chronic pain, non distended; +BS x4; soft brown stool in ileostomy  : Voiding without difficulty  MUSC: Moves all extremities; generalized weakness  SKIN: pale, warm and dry, normal turgor          The above physical exam was reviewed and updated as determined by my evaluation of the patient on 3/12/2023  Invasive Devices     Central Venous Catheter Line  Duration           Port A Cath 03/10/22 Right Chest 366 days          Drain  Duration           Ileostomy  days    Cholecystostomy Tube 15 days                   VTE Pharmacologic Prophylaxis: Enoxaparin  Code Status: Level 1 - Full Code  Current Length of Stay: 16 day(s)      Total time spent:  30 minutes with more than 50% spent counseling/coordinating care  Counseling includes discussion with patient re: progress  and discussion with patient of his/her current medical state/information  Coordination of patient's care was performed in conjunction with primary service   Time invested included review of patient's labs, vitals, and management of their comorbidities with continued monitoring  In addition, this patient was discussed with medical team including physician and advanced extenders  The care of the patient was extensively discussed and appropriate treatment plan was formulated unique for this patient  Medical decision making for the day was made by supervising physician unless otherwise noted in their attestation statement  ** Please Note:  voice to text software may have been used in the creation of this document   Although proof errors in transcription or interpretation are a potential of such software**

## 2023-03-13 ENCOUNTER — APPOINTMENT (INPATIENT)
Dept: RADIOLOGY | Facility: HOSPITAL | Age: 58
End: 2023-03-13

## 2023-03-13 ENCOUNTER — APPOINTMENT (INPATIENT)
Dept: RADIOLOGY | Facility: HOSPITAL | Age: 58
End: 2023-03-13
Attending: PHYSICAL MEDICINE & REHABILITATION

## 2023-03-13 ENCOUNTER — APPOINTMENT (INPATIENT)
Dept: RADIOLOGY | Facility: HOSPITAL | Age: 58
End: 2023-03-13
Attending: RADIOLOGY

## 2023-03-13 LAB
ANION GAP SERPL CALCULATED.3IONS-SCNC: 5 MMOL/L (ref 4–13)
APPEARANCE FLD: ABNORMAL
BASOPHILS # BLD AUTO: 0.07 THOUSANDS/ÂΜL (ref 0–0.1)
BASOPHILS NFR BLD AUTO: 0 % (ref 0–1)
BUN SERPL-MCNC: 20 MG/DL (ref 5–25)
CALCIUM SERPL-MCNC: 8 MG/DL (ref 8.3–10.1)
CHLORIDE SERPL-SCNC: 104 MMOL/L (ref 96–108)
CO2 SERPL-SCNC: 22 MMOL/L (ref 21–32)
COLOR FLD: YELLOW
CREAT SERPL-MCNC: 1.47 MG/DL (ref 0.6–1.3)
EOSINOPHIL # BLD AUTO: 0 THOUSAND/ÂΜL (ref 0–0.61)
EOSINOPHIL NFR BLD AUTO: 0 % (ref 0–6)
EOSINOPHIL NFR FLD MANUAL: 1 %
ERYTHROCYTE [DISTWIDTH] IN BLOOD BY AUTOMATED COUNT: 17.2 % (ref 11.6–15.1)
GFR SERPL CREATININE-BSD FRML MDRD: 51 ML/MIN/1.73SQ M
GLUCOSE FLD-MCNC: 118 MG/DL
GLUCOSE P FAST SERPL-MCNC: 110 MG/DL (ref 65–99)
GLUCOSE SERPL-MCNC: 106 MG/DL (ref 65–140)
GLUCOSE SERPL-MCNC: 109 MG/DL (ref 65–140)
GLUCOSE SERPL-MCNC: 110 MG/DL (ref 65–140)
GLUCOSE SERPL-MCNC: 127 MG/DL (ref 65–140)
GLUCOSE SERPL-MCNC: 93 MG/DL (ref 65–140)
GRAM STN SPEC: NORMAL
HCT VFR BLD AUTO: 25.6 % (ref 36.5–49.3)
HGB BLD-MCNC: 7.9 G/DL (ref 12–17)
IMM GRANULOCYTES # BLD AUTO: 0.28 THOUSAND/UL (ref 0–0.2)
IMM GRANULOCYTES NFR BLD AUTO: 2 % (ref 0–2)
LDH FLD L TO P-CCNC: 256 U/L
LYMPHOCYTES # BLD AUTO: 1.47 THOUSANDS/ÂΜL (ref 0.6–4.47)
LYMPHOCYTES NFR BLD AUTO: 13 %
LYMPHOCYTES NFR BLD AUTO: 8 % (ref 14–44)
MCH RBC QN AUTO: 26.2 PG (ref 26.8–34.3)
MCHC RBC AUTO-ENTMCNC: 30.9 G/DL (ref 31.4–37.4)
MCV RBC AUTO: 85 FL (ref 82–98)
MONO+MESO NFR FLD MANUAL: 2 %
MONOCYTES # BLD AUTO: 1.62 THOUSAND/ÂΜL (ref 0.17–1.22)
MONOCYTES NFR BLD AUTO: 46 %
MONOCYTES NFR BLD AUTO: 8 % (ref 4–12)
NEUTROPHILS # BLD AUTO: 15.84 THOUSANDS/ÂΜL (ref 1.85–7.62)
NEUTS SEG NFR BLD AUTO: 38 %
NEUTS SEG NFR BLD AUTO: 82 % (ref 43–75)
NRBC BLD AUTO-RTO: 0 /100 WBCS
PH BODY FLUID: 7.6
PLATELET # BLD AUTO: 325 THOUSANDS/UL (ref 149–390)
PMV BLD AUTO: 10.1 FL (ref 8.9–12.7)
POTASSIUM SERPL-SCNC: 4 MMOL/L (ref 3.5–5.3)
PROT FLD-MCNC: 3.6 G/DL
RBC # BLD AUTO: 3.02 MILLION/UL (ref 3.88–5.62)
RBC # FLD MANUAL: 2000 /UL
SITE: ABNORMAL
SODIUM SERPL-SCNC: 131 MMOL/L (ref 135–147)
TOTAL CELLS COUNTED SPEC: 100
WBC # BLD AUTO: 19.28 THOUSAND/UL (ref 4.31–10.16)
WBC # FLD MANUAL: 755 /UL

## 2023-03-13 PROCEDURE — 0W9G30Z DRAINAGE OF PERITONEAL CAVITY WITH DRAINAGE DEVICE, PERCUTANEOUS APPROACH: ICD-10-PCS | Performed by: RADIOLOGY

## 2023-03-13 PROCEDURE — 0W993ZZ DRAINAGE OF RIGHT PLEURAL CAVITY, PERCUTANEOUS APPROACH: ICD-10-PCS | Performed by: RADIOLOGY

## 2023-03-13 RX ORDER — LIDOCAINE HYDROCHLORIDE 10 MG/ML
INJECTION, SOLUTION EPIDURAL; INFILTRATION; INTRACAUDAL; PERINEURAL AS NEEDED
Status: COMPLETED | OUTPATIENT
Start: 2023-03-13 | End: 2023-03-13

## 2023-03-13 RX ORDER — FENTANYL CITRATE 50 UG/ML
INJECTION, SOLUTION INTRAMUSCULAR; INTRAVENOUS AS NEEDED
Status: COMPLETED | OUTPATIENT
Start: 2023-03-13 | End: 2023-03-13

## 2023-03-13 RX ADMIN — PANTOPRAZOLE SODIUM 40 MG: 40 TABLET, DELAYED RELEASE ORAL at 06:03

## 2023-03-13 RX ADMIN — SODIUM BICARBONATE 1300 MG: 650 TABLET ORAL at 10:10

## 2023-03-13 RX ADMIN — SIMETHICONE 80 MG: 80 TABLET, CHEWABLE ORAL at 21:43

## 2023-03-13 RX ADMIN — IOHEXOL 10 ML: 300 INJECTION, SOLUTION INTRAVENOUS at 16:17

## 2023-03-13 RX ADMIN — OXYCODONE HYDROCHLORIDE 5 MG: 5 TABLET ORAL at 06:02

## 2023-03-13 RX ADMIN — OXYCODONE HYDROCHLORIDE 5 MG: 5 TABLET ORAL at 20:29

## 2023-03-13 RX ADMIN — SODIUM CHLORIDE 75 ML/HR: 0.9 INJECTION, SOLUTION INTRAVENOUS at 20:31

## 2023-03-13 RX ADMIN — ERTAPENEM SODIUM 1000 MG: 1 INJECTION, POWDER, LYOPHILIZED, FOR SOLUTION INTRAMUSCULAR; INTRAVENOUS at 17:40

## 2023-03-13 RX ADMIN — SODIUM CHLORIDE 75 ML/HR: 0.9 INJECTION, SOLUTION INTRAVENOUS at 04:02

## 2023-03-13 RX ADMIN — FENTANYL CITRATE 25 MCG: 50 INJECTION, SOLUTION INTRAMUSCULAR; INTRAVENOUS at 15:46

## 2023-03-13 RX ADMIN — MIDODRINE HYDROCHLORIDE 5 MG: 5 TABLET ORAL at 06:03

## 2023-03-13 RX ADMIN — MELATONIN 6 MG: at 21:43

## 2023-03-13 RX ADMIN — COLLAGENASE SANTYL: 250 OINTMENT TOPICAL at 17:59

## 2023-03-13 RX ADMIN — ONDANSETRON 4 MG: 2 INJECTION INTRAMUSCULAR; INTRAVENOUS at 09:41

## 2023-03-13 RX ADMIN — HYDROXYZINE HYDROCHLORIDE 10 MG: 10 TABLET ORAL at 20:30

## 2023-03-13 RX ADMIN — CALCIUM CARBONATE (ANTACID) CHEW TAB 500 MG 500 MG: 500 CHEW TAB at 17:54

## 2023-03-13 RX ADMIN — ONDANSETRON 4 MG: 2 INJECTION INTRAMUSCULAR; INTRAVENOUS at 17:40

## 2023-03-13 RX ADMIN — SODIUM BICARBONATE 1300 MG: 650 TABLET ORAL at 17:57

## 2023-03-13 RX ADMIN — TAMSULOSIN HYDROCHLORIDE 0.4 MG: 0.4 CAPSULE ORAL at 17:54

## 2023-03-13 RX ADMIN — PANTOPRAZOLE SODIUM 40 MG: 40 TABLET, DELAYED RELEASE ORAL at 17:54

## 2023-03-13 RX ADMIN — ATORVASTATIN CALCIUM 40 MG: 40 TABLET, FILM COATED ORAL at 09:42

## 2023-03-13 RX ADMIN — LIDOCAINE HYDROCHLORIDE 10 ML: 10 INJECTION, SOLUTION EPIDURAL; INFILTRATION; INTRACAUDAL; PERINEURAL at 15:06

## 2023-03-13 RX ADMIN — MIDODRINE HYDROCHLORIDE 5 MG: 5 TABLET ORAL at 17:55

## 2023-03-13 RX ADMIN — MIDODRINE HYDROCHLORIDE 5 MG: 5 TABLET ORAL at 12:20

## 2023-03-13 RX ADMIN — SIMETHICONE 80 MG: 80 TABLET, CHEWABLE ORAL at 17:54

## 2023-03-13 RX ADMIN — METHOCARBAMOL 500 MG: 500 TABLET ORAL at 17:54

## 2023-03-13 RX ADMIN — ASPIRIN 81 MG CHEWABLE TABLET 81 MG: 81 TABLET CHEWABLE at 09:42

## 2023-03-13 RX ADMIN — DULOXETINE HYDROCHLORIDE 30 MG: 30 CAPSULE, DELAYED RELEASE ORAL at 09:42

## 2023-03-13 NOTE — PROGRESS NOTES
03/13/23 1100   Assessment   Treatment Assessment Spoke with OT Robin Logan who reports in OT session prior to scheduled PT session pt with increase in pain and unable to get OOB  Pt to go for medical procedure sometime today, also NPO  PT arrived to pt's room to see if pt willing to participate in PT tx  Pt with extreme fatigue therefore will attempt to see pt in PM pending pt's participation  Therapy Time missed   Time missed? Yes   Amount of time missed 30   Reason for time missed Extreme fatigue; Illness

## 2023-03-13 NOTE — SEDATION DOCUMENTATION
IR left upper quadrant abdominal drainage tube placement by Dr Pradhan  Tube connected to lyric bulb  Fluid sent for cultures  Patient tolerated procedure well

## 2023-03-13 NOTE — PROGRESS NOTES
Progress Note - Infectious Disease   Venkata Muñoz 61 y o  male MRN: [id number]  Unit/Bed#: -01 Encounter: [id number]      Impression/Recommendations:  Recurrent intraabdominal abscess  In the setting complex surgical history as below   Initially developed 11/2022 accompanied by ESBL E  coli bacteremia   Status post exploratory laparotomy, right hemicolectomy, temporary abdominal closure 11/16; re-exploration, partial left colectomy, primary ileocolonic anastomosis with proximal diverting loop ileostomy, midline fascial closure on 11/17   Then developed abscess in hepatectomy bed, also collection +/- leak LUQ at area of prior colonic anastamosis   Status post IR drain into perihepatic collection 12/8   Cultures with ESBL E  Coli   Status post 7 days ertapenem   Developed recurrent sepsis and repeat CT 12/17 shows hepatic bed collection improved, persistent left intra-abdominal collection   Status post perigastic, perisplenic drains 12/20   Cultures again with ESBL E  Coli   Status post 21 days total antibiotics, completed 1/7   Left drains x2 removed 1/31   Midline hepatic bed drain removed 2/24   Collections improving off antibiotics  MRCP done for rising LFTs now shows recurrence  Rec:  · Continue ertapenem for now  · Drain replacement per IR  · If abscess actually intraparenchymal, may need more prolonged course of antibiotics  · Follow temperatures closely  · Check CBC in AM  · Supportive care as per the primary service       Avlina-enteric fistula  Newly discovered on 2/10/23  [facility] setting of chronic cholecystostomy tube for possible cholecystitis, perihepatic abscess as below   Alvina tube now with drainage consistent with this   Repeat tube check 3/7 shows no evidence of fistula   Tube now capped  Chronic abdominal wound    In setting of prior incisional dehiscence   Status post recent debridement   Wound now appears clean      Metastatic colon cancer     To liver, possible lung   Status post resection, chemotherapy, more recent hepatic resection and ablation, transverse colon resection   Recent rise in bilirubin, chronically elevated alk phos      CKD     Baseline Cr 1 4       DM  Antibiotics:  Ertapenem #1    Subjective:  Patient seen on AM rounds  Sleeping and not awakened  Reported not feeling well to surgery resident on early AM rounds  24 Hour Events:  No documented fevers, chills, sweats, nausea, vomiting, or diarrhea  Objective:  Vitals:  Temp:  [97 5 °F (36 4 °C)-98 5 °F (36 9 °C)] 98 1 °F (36 7 °C)  HR:  [] 91  Resp:  [16-18] 16  BP: ()/(60-86) 142/82  SpO2:  [93 %-96 %] 93 %  Temp (24hrs), Av °F (36 7 °C), Min:97 5 °F (36 4 °C), Max:98 5 °F (36 9 °C)  Current: Temperature: 98 1 °F (36 7 °C)    Physical Exam:   General:  No acute distress  Psychiatric:  Sleeping  Normal respiratory excursion without accessory muscle use  Abdomen:  Nondistended  Extremities:  No edema  Skin:  No rashes    Lab Results:  I have personally reviewed pertinent labs  Results from last 7 days   Lab Units 23  0608 23  0546 23  1052 03/10/23  0647 23  0647   POTASSIUM mmol/L 4 0 4 2 4 1 4 2 4 1   CHLORIDE mmol/L 104 103 103 102 105   CO2 mmol/L 22 21 20* 18* 18*   BUN mg/dL 20 20 20 20 19   CREATININE mg/dL 1 47* 1 45* 1 53* 1 38* 1 36*   EGFR ml/min/1 73sq m 51 52 49 55 56   CALCIUM mg/dL 8 0* 8 1* 8 0* 8 2* 8 3   AST U/L  --   --  70* 86* 87*   ALT U/L  --   --  13 15 16   ALK PHOS U/L  --   --  947* 984* 972*     Results from last 7 days   Lab Units 23  0608 23  0546 23  1052   WBC Thousand/uL 19 28* 20 27* 16 91*   HEMOGLOBIN g/dL 7 9* 8 2* 7 1*   PLATELETS Thousands/uL 325 365 345     Results from last 7 days   Lab Units 23  1515 23  1233 23  1629   BLOOD CULTURE  Received in Microbiology Lab  Culture in Progress  Received in Microbiology Lab  Culture in Progress    --   --    GRAM STAIN RESULT   --  2+ Disintegrating polys* Rare Gram negative rods*  --    WOUND CULTURE   --  1+ Growth of Gram Negative Johnie*  --    C DIFF TOXIN B BY PCR   --   --  Negative       Imaging Studies:   I have personally reviewed pertinent imaging study reports and images in PACS  EKG, Pathology, and Other Studies:   I have personally reviewed pertinent reports

## 2023-03-13 NOTE — PROGRESS NOTES
Progress note - Palliative and Supportive Care   Osvaldo Grey 61 y o  male [id number]    Patient Active Problem List   Diagnosis   • Type 2 diabetes mellitus without complication, with long-term current use of insulin (HCC)   • Benign essential hypertension   • Mixed hyperlipidemia   • Erectile dysfunction   • Low testosterone   • Obesity (BMI 30-39  9)   • Testicular hypogonadism   • Incarcerated umbilical hernia   • Left ureteral calculus   • Type 2 diabetes mellitus with hyperlipidemia (HCC)   • Anemia   • Hypokalemia   • Transaminitis   • Thrombocytosis   • Iron deficiency anemia, unspecified   • Malignant neoplasm of transverse colon (HCC)   • 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)   • Colon cancer metastasized to liver [facility])   • Colostomy prolapse (HCC)   • Other fatigue   • Cervical radiculopathy   • Encephalopathy   • MR (mitral regurgitation)   • ESBL (extended spectrum beta-lactamase) producing bacteria infection   • Severe protein-calorie malnutrition (HCC)   • Acute on chronic kidney failure (HCC)   • Sepsis (HCC)   • Abscess   • Acute pain   • Leukocytosis   • Dehiscence of incision   • Elevated alkaline phosphatase level   • Abnormal CT scan   • Hyponatremia   • Nephrolithiasis   • Abdominal wound dehiscence   • Cholecystitis, unspecified   • Depression   • Chronic bilateral pleural effusions     Active issues specifically addressed today include:   • Palliative care encounter  • Intra-abdominal abscess  • Cancer associated pain  • Metastatic colon cancer    Plan:  1  Symptom management -   • Continue cymbalta 30mg QD  • Atarax 10mg HS  • Melatonin 6mg HS  • Oxycodone 2 5-5mg PO Q4H PRN moderate-severe pain  • Robaxin 500mg BID PRN muscle spasms  • Bowel regimen to prevent OIC   -     2  Goals -patient does admit that he has been very frustrated with his recurrent hospitalizations  However he does confirm ongoing disease directed cares  -    3   Psychosocial support-time spent with patient today providing supportive listening  He does note that he has had a lot of emotional ups and downs with his rehospitalization's  He tells me that sometimes he feels like it is overwhelming and that he keeps going back to the beginning, discuss the positives and the progression he is made and he does want to continue doing this    -    4  [facility] [id number] will continue to follow for support       Code Status: Full code  - Level 1   Decisional apparatus:  Patient is competent on my exam today  If competence is lost, patient's substitute decision maker would default to spouse [place]  Advance Directive / Living Will / POLST: None on file  Interval history:       Overall, patient states that he is not feeling well today but he feels that it is more of an emotional burden than a physical 1  He states that he feels that he keeps "ending up at the beginning"  Long time spent with patient today exploring his concerns and his hopes for the future  He agrees to ongoing palliative care support    MEDICATIONS / ALLERGIES:     all current active meds have been reviewed and current meds:   Current Facility-Administered Medications   Medication Dose Route Frequency   • acetaminophen (TYLENOL) tablet 650 mg  650 mg Oral Q4H PRN   • alteplase (CATHFLO) injection 2 mg  2 mg Intracatheter Once   • aspirin chewable tablet 81 mg  81 mg Oral Daily   • atorvastatin (LIPITOR) tablet 40 mg  40 mg Oral Daily   • calcium carbonate (TUMS) chewable tablet 500 mg  500 mg Oral TID PRN   • collagenase (SANTYL) ointment   Topical Daily   • DULoxetine (CYMBALTA) delayed release capsule 30 mg  30 mg Oral Daily   • ertapenem (INVanz) 1,000 mg in sodium chloride 0 9 % 50 mL IVPB  1,000 mg Intravenous Q24H   • hydrOXYzine HCL (ATARAX) tablet 10 mg  10 mg Oral HS   • insulin lispro (HumaLOG) 100 units/mL subcutaneous injection 1-6 Units  1-6 Units Subcutaneous TID AC   • insulin lispro (HumaLOG) 100 units/mL subcutaneous injection 1-6 Units  1-6 Units Subcutaneous HS   • melatonin tablet 6 mg  6 mg Oral HS   • methocarbamol (ROBAXIN) tablet 500 mg  500 mg Oral BID PRN   • midodrine (PROAMATINE) tablet 5 mg  5 mg Oral TID AC   • ondansetron (ZOFRAN) injection 4 mg  4 mg Intravenous Q4H PRN   • oxyCODONE (ROXICODONE) IR tablet 2 5 mg  2 5 mg Oral Q4H PRN   • oxyCODONE (ROXICODONE) IR tablet 5 mg  5 mg Oral Q4H PRN   • pantoprazole (PROTONIX) EC tablet 40 mg  40 mg Oral BID AC   • simethicone (MYLICON) chewable tablet 80 mg  80 mg Oral 4x Daily (with meals and at bedtime)   • sodium bicarbonate tablet 1,300 mg  1,300 mg Oral TID after meals   • sodium chloride 0 9 % infusion  75 mL/hr Intravenous Continuous   • tamsulosin (FLOMAX) capsule 0 4 mg  0 4 mg Oral Daily With Dinner       Allergies   Allergen Reactions   • Shellfish-Derived Products - Food Allergy Anaphylaxis   • Erythromycin GI Intolerance       OBJECTIVE:    Physical Exam  Physical Exam  Vitals and nursing note reviewed  Constitutional:       General: He is not in acute distress  HENT:      Head: Normocephalic and atraumatic  Right Ear: External ear normal       Left Ear: External ear normal       Nose: Nose normal    Eyes:      General: No scleral icterus  Right eye: No discharge  Left eye: No discharge  Cardiovascular:      Rate and Rhythm: Normal rate and regular rhythm  Pulmonary:      Effort: Pulmonary effort is normal  No respiratory distress  Breath sounds: Normal breath sounds  Abdominal:      General: Bowel sounds are normal  There is no distension  Palpations: Abdomen is soft  Skin:     General: Skin is warm and dry  Coloration: Skin is pale  Neurological:      Mental Status: He is alert and oriented to person, place, and time  Lab Results:   I have personally reviewed pertinent labs  , CBC:   Lab Results   Component Value Date    WBC 19 28 (H) 03/13/2023    HGB 7 9 (L) 03/13/2023    HCT 25 6 (L) 03/13/2023 MCV 85 03/13/2023     03/13/2023    MCH 26 2 (L) 03/13/2023    MCHC 30 9 (L) 03/13/2023    RDW 17 2 (H) 03/13/2023    MPV 10 1 03/13/2023    NRBC 0 03/13/2023   , CMP:   Lab Results   Component Value Date    SODIUM 131 (L) 03/13/2023    K 4 0 03/13/2023     03/13/2023    CO2 22 03/13/2023    BUN 20 03/13/2023    CREATININE 1 47 (H) 03/13/2023    CALCIUM 8 0 (L) 03/13/2023    EGFR 51 03/13/2023   , PT/PTT:No results found for: PT, PTT  Imaging Studies: Reviewed  EKG, Pathology, and Other Studies: Reviewed    Counseling / Coordination of Care    Total floor / unit time spent today 25+ minutes  Greater than 50% of total time was spent with the patient and / or family counseling and / or coordination of care  A description of the counseling / coordination of care: Chart review, medication review, prolonged time spent providing supportive listening    Portions of this document may have been created using dictation software and as such some "sound alike" terms may have been generated by the system  Do not hesitate to contact me with any questions or clarifications

## 2023-03-13 NOTE — QUICK NOTE
See consult note for IR completed by Dr Sravanthi Joyce on 3/11 for full assessment of recurrent abscess     -Plan for drain placement and thoracentesis today pending IR schedule  -Please keep patient NPO  -Remainder of care per primary team  -Please reach out to IR with any questions/concerns    Ileana Murcia PA-C

## 2023-03-13 NOTE — PROGRESS NOTES
Internal Medicine Progress Note  Patient: Leobardo Camarena  Age/sex: 61 y o  male  Medical Record #: [id number]      ASSESSMENT/PLAN: (Interval History)  Leobardo Camarena is seen and examined and management for following issues:    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  • s/p diverting loop colostomy/liver biopsy with subsequent chemotherapy 2/2022  • s/p hepatic resection and reversal of colostomy on 11/7/22  • s/p ex-lap with bowel resection/VAC placement 11/16/22  • s/p right hemicolectomy with partial descending colectomy, colocolonic anastomosis with loop ileostomy and mid line abdominal VAC placement 11/17  • s/p additional drains placed for a perisplenic abscess and splenic abscess 12/20/22 (then had 3 drains plus the already existing perc radha tube)  • The 2 left lateral drains were removed by IR 1/31/23 as an OP  • Had previously been tx'd with Ertapenem  • This hospital stay had fever/leukocytosis/sepsis, started Meropenem and then to Vancomycin and Ertapenem and wound cx again = Ecoli ESBL  • LD Ertapenem 2/14, Vancomycin 2/16  • To IR 2/24 = Abscessogram of midline drain showed minimal persistent collection and the midline catheter was removed  • Recent wound debridement by general surgery prior to transfer = continue Santyl qd to wound  • Requesting palliative care consult  • Has chr elevated Alk phos   • Now for drain placement today 2/2 MRI results and purulent drainage from incision and MRI results noting inc in abscess cavity     Acute cholecystitis  • s/p percutaneous tube placement 12/8/22  • CT scan was concerning for cholecystitis --> to IR 2/24 = showed retraction of the radha tube and interval occlusion of the cystic duct   +Cholecysto enteric fistula   Radha tube was exchanged   Surgery saw 2/27/23 = stable  • Drainage from perc radha tube milky light green (20ml on 3/4/23 and nothing recorded from 3/5 or 3/6 although he says they did dump some from [facility]ach[facility] Gray 3/5)  • Perc radha tube check 3/7/23 --> slight adjustment of tube further into GB body and tube is capped = may be removed if pt passes capping trial in 2 weeks and if surgery is in agreement --> Jenelle Perez from [address] [address] Gabrielle Darnell 3/7/23 and he was OK with that plan  • 3/10 MRCP = increased abscess 5 1 cm, innumerable lesions, increased R pleural effusion  • On most recent tube check last week the fistula was resolved     PORT catheter  • On 2/24/23 in IR = port check showed probable small thrombus at tip of cathter, flushed/improved aspiration of the port     Orthostasis/tachycardia  • Started Midodrine 2 5mg BID on 3/2/23 (give at 0600, 1100)   • getting IVF since tachy and orthostatic over the weekend     Increased ileostomy drainage  • Had not been an issue prior  • cdiff negative 3/8/23     Diabetes mellitus  • Home:  Lantus 8U qam/Lispro 4U TID  • Here:  cont sliding scale only   • Has a DEXCOM meter and a regular meter at home  • Continue DM diet, QID Accuchecks/SSI  • Does not want lantus and standing insulin     CKD III  • Baseline 1 2-1 4  • Creat was 2 5 on admission  • Creatinine 1 47 this AM  • Renal following     Metabolic acidosis  • Renal managing  • Bmp in am     Anemia  • Transfused in December and January  • Hemoglobin was 7 1 on 3/12  • s/p infusion 1 U PRBCs 3/11/23  • stable     Situational depression  • Cymbalta      Pleural effusions  • Thoracentesis 1/12 for 300ml  • Cyto from pleural fluid was negative for malignancy; cx = NG  • CT 2/22/23 = "Interval worsening of moderate right and small left pleural effusions"  • MRI/MRCP now shows large right pleural effusion and he is for thoracentesis today in IR     Hyponatremia  • Felt likely 2/2 SIADH in setting of malignancy  • NACL tabs were stopped per renal 3/9/23 and changed to sodium bicarbonate tabs to tx metabolic acidosis  • Continue FR 1500ml     Malnutrition  • Prefers premier protein banana flavored shake --> wife brought in   One bottle has 30 Gm protein and only 4 carbs     Leukocytosis  • WBC up to 20  • ID following = blood/wound cxs obtained 3/12/23 and Ertapenem restarted empirically  • afebrile        Discharge date: Steven Neither    The above assessment and plan was reviewed and updated as determined by my evaluation of the patient on 3/13/2023      Labs:   Results from last 7 days   Lab Units 03/13/23  0608 03/12/23  0546   WBC Thousand/uL 19 28* 20 27*   HEMOGLOBIN g/dL 7 9* 8 2*   HEMATOCRIT % 25 6* 26 3*   PLATELETS Thousands/uL 325 365     Results from last 7 days   Lab Units 03/13/23  0608 03/12/23  0546   SODIUM mmol/L 131* 130*   POTASSIUM mmol/L 4 0 4 2   CHLORIDE mmol/L 104 103   CO2 mmol/L 22 21   BUN mg/dL 20 20   CREATININE mg/dL 1 47* 1 45*   CALCIUM mg/dL 8 0* 8 1*         Results from last 7 days   Lab Units 03/11/23  1340   INR  1 43*     Results from last 7 days   Lab Units 03/13/23  1049 03/13/23  0633 03/12/23  2143   POC GLUCOSE mg/dl 109 127 149*       Review of Scheduled Meds:  Current Facility-Administered Medications   Medication Dose Route Frequency Provider Last Rate   • acetaminophen  650 mg Oral Q4H PRN Marquita [place]er, DO     • alteplase  2 mg Intracatheter Once GAURANG Acosta     • aspirin  81 mg Oral Daily Marquita [place]er, DO     • atorvastatin  40 mg Oral Daily Marquita [place], DO     • calcium carbonate  500 mg Oral TID PRN GAURANG Zambrano     • collagenase   Topical Daily Marquita [place]er, DO     • DULoxetine  30 mg Oral Daily Marquita [place]er, DO     • ertapenem  1,000 mg Intravenous Q24H Riky Sanchez MD Stopped (03/12/23 4288)   • hydrOXYzine HCL  10 mg Oral HS Marquita [place]er, DO     • insulin lispro  1-6 Units Subcutaneous TID AC Marquita [place]er, DO     • insulin lispro  1-6 Units Subcutaneous HS Marquita [place]er, DO     • melatonin  6 mg Oral HS GAURANG Acosta     • methocarbamol  500 mg Oral BID PRN Marquita [place]er, DO     • midodrine  5 mg Oral TID AC GAURANG Eubanks     • ondansetron  4 mg Intravenous Q4H PRN Blayne Earthly, DO     • oxyCODONE  2 5 mg Oral Q4H PRN Blayne Earthly, DO     • oxyCODONE  5 mg Oral Q4H PRN Blayne Earthly, DO     • pantoprazole  40 mg Oral BID AC Blayne Earthly, DO     • simethicone  80 mg Oral 4x Daily (with meals and at bedtime) Blayne Earthly, DO     • sodium bicarbonate  1,300 mg Oral TID after meals Aris Severino MD     • sodium chloride  75 mL/hr Intravenous Continuous GAURANG Contreras 75 mL/hr (03/13/23 0402)   • tamsulosin  0 4 mg Oral Daily With 1 [facility] [place]         Subjective/ HPI: Patient seen and examined  Patients overnight issues or events were reviewed with nursing or staff during rounds or morning huddle session  New or overnight issues include the following:     No new or overnight issues  Going to IR for drain placement and right thoracentesis  Offers no complaints    ROS:   A 10 point ROS was performed; negative except as noted above  Imaging:     MRI abdomen wo contrast and mrcp   Final Result by Isa Falk MD (03/10 4079)      No choledocholithiasis  Increased size of T2 hyperintense focus at the left hepatic lobe now measuring 5 1 cm consistent with enlarging abscess cavity following recent removal of drainage catheter  Innumerable hepatic metastatic lesions again seen  Increase in size of large right pleural effusion  Small left pleural effusion                  Workstation performed: [id number]         IR cholecystostomy tube check/change/reposition/reinsertion/upsize   Final Result by Rhea Nevarez MD (03/10 4520)   Impression: Resolution of coloenteric fistula  Patent cystic and common bile duct, filling of contrast into the duodenum briskly  Tube capped, will return in 2 weeks for possible tube removal depending on input from surgeons  Continue to flush with 10    mL normal saline daily  Signed, performed, and dictated by GAURANG Giles under the supervision of Rhea Nevarez        Workstation performed: [id number]         IR IN-Patient Thoracentesis    (Results Pending)   IR drainage tube placement    (Results Pending)       *Labs /Radiology studies reviewed  *Medications reviewed and reconciled as needed  *Please refer to order section for additional ordered labs studies  *Case discussed with primary attending during morning huddle case rounds    Physical Examination:  Vitals:   Vitals:    03/12/23 1700 03/12/23 2108 03/13/23 0100 03/13/23 0602   BP: 147/85 149/86 133/80 142/82   BP Location: Right arm Right arm Left arm Right arm   Pulse: 91 93 94 91   Resp: 18 17 17 16   Temp: 97 5 °F (36 4 °C) 97 8 °F (36 6 °C) 98 5 °F (36 9 °C) 98 1 °F (36 7 °C)   TempSrc: Oral Oral Oral Oral   SpO2: 96% 94% 94% 93%   Weight:       Height:           General Appearance: no distress, conversive  HEENT:  External ear normal   Nose normal w/o drainage  Mucous membranes are moist  Oropharynx is clear  Conjunctiva clear w/o icterus or redness  Neck:  Supple, normal ROM  Lungs: BBS without crackles/wheeze/rhonchi; respirations unlabored with normal inspiratory/expiratory effort  No retractions noted  On RA  CV: regular rate and rhythm; no rubs/murmurs/gallops, PMI normal   ABD: Abdomen is soft  Bowel sounds all quadrants  Nontender with no distention  EXT: no edema  Skin: normal turgor, normal texture, no rashes  Psych: affect normal, mood normal  Neuro: AAO    The above physical exam was reviewed and updated as determined by my evaluation of the patient on 3/13/2023  Invasive Devices     Central Venous Catheter Line  Duration           Port A Cath 03/10/22 Right Chest 367 days          Drain  Duration           Ileostomy  days    Cholecystostomy Tube 17 days                   VTE Pharmacologic Prophylaxis: Lovenox is on hold since going to IR today  Code Status: Level 1 - Full Code  Current Length of Stay: 17 day(s)      Total time spent:  30 minutes with more than 50% spent counseling/coordinating care  Counseling includes discussion with patient re: progress  and discussion with patient of his/her current medical state/information  Coordination of patient's care was performed in conjunction with primary service  Time invested included review of patient's labs, vitals, and management of their comorbidities with continued monitoring  In addition, this patient was discussed with medical team including physician and advanced extenders  The care of the patient was extensively discussed and appropriate treatment plan was formulated unique for this patient  Medical decision making for the day was made by supervising physician unless otherwise noted in their attestation statement  ** Please Note:  voice to text software may have been used in the creation of this document   Although proof errors in transcription or interpretation are a potential of such software**

## 2023-03-13 NOTE — PLAN OF CARE
Problem: PAIN - ADULT  Goal: Verbalizes/displays adequate comfort level or baseline comfort level  Description: Interventions:  - Encourage patient to monitor pain and request assistance  - Assess pain using appropriate pain scale  - Administer analgesics based on type and severity of pain and evaluate response  - Implement non-pharmacological measures as appropriate and evaluate response  - Consider cultural and social influences on pain and pain management  - Notify physician/advanced practitioner if interventions unsuccessful or patient reports new pain  3/13/2023 1029 by Priya Hawkins RN  Outcome: Progressing  3/13/2023 1029 by Priya Hawkins RN  Outcome: Progressing

## 2023-03-13 NOTE — PROGRESS NOTES
NEPHROLOGY PROGRESS NOTE   Romy Weathers 61 y o  male MRN: [id number]  Unit/Bed#: [facility] 232-81 Encounter: [id number]    ASSESSMENT & PLAN:  69-year-old male with history of chronic kidney disease stage III, hypertension, diabetes mellitus, history of metastatic colon cancer status post partial colectomy and diabetic ileostomy complicated by intra-abdominal abscess/liver metastasis/cholecystitis requiring recurrent hospitalization  Recently presented to [facility] in February found to have sepsis and was treated with antibiotics, was found to have cholecystitis and abdominal drain was placed  Nephrology was consulted on 3/8 for management of elevated creatinine on top of pre-existing chronic kidney disease  Baseline creatinine was 1 2-1 4 but creatinine was elevated at 1 5-1 7 for which nephrology was consulted    Elevated serum creatinine  -Baseline creatinine: 1 2-1 4 mg/dl  -Admission creatinine: Had acute kidney injury on admission at [facility] with admission creatinine of 2 5 which resolved at the time of initial consult at [facility]  -Etiology: Elevated creatinine was thought to be prerenal  [facility] Course: Currently stable at creatinine 1 4  -Plan:   · Function stable at creatinine 1 47 mg/dL  Discussed with primary team that as patient has been persistent GI output would continue current IV fluids-normal saline at 75 mL/h, patient appears clinically euvolemic, primary team agreed with the plan  Patient also with finding of orthostatic hypotension suggesting intravascular volume depletion so would continue current IV fluids  · Avoid nephrotoxins and dose all medications per EGFR  · Avoid hypotension  Chronic Kidney Disease Stage 3  -Outpatient Nephrologist: none   -Baseline Creatinine: 1 2-1 4  -Avoid Nephrotoxins and Dose all medications per eGFR  -Will need outpatient follow up after discharge      Hyponatremia  -Sodium level was 1 31-1 38 prior to rehab  -Possibly due to volume depletion, sodium level improving with IV normal saline, level today 131, continue to monitor  Also possibility of underlying SIADH  -Continue fluid restriction 1 5 L/day    Metabolic acidosis suspected due to increased GI loss with diarrhea continue oral sodium bicarbonate tablets  Bicarb level currently stable at 22    Orthostatic hypotension suspected to be due to intravascular volume depletion, continue current IV fluids and continue midodrine 5 mg 3 times daily  Avoid hypotension    Bilateral pleural effusion, noted plan for thoracentesis    Cholecystitis status post percutaneous cholecystostomy tube complicated by [place] enteric fistula, currently with percutaneous cholecystostomy tube that is capped  Having loose bowel movements through ileostomy  Management per GI team    Left hepatic lobe abscess status post drain removal on 2/24 with recurrence of abscess on MRI abd, IR planning for new drain placement    Metastatic colon adenocarcinoma status postresection, chemo, recent hepatic resection and ablation, status post transverse colon resection with ileostomy  Anemia, status post PRBC on 3/11  Hemoglobin today acceptable at 7 9, continue to monitor per primary team      SUBJECTIVE:  No new complaints  No chest pain or shortness of breath    OBJECTIVE:  Current Weight: Weight - Scale: 80 1 kg (176 lb 9 4 oz)  Vitals:    03/13/23 0602   BP: 142/82   Pulse: 91   Resp: 16   Temp: 98 1 °F (36 7 °C)   SpO2: 93%       Intake/Output Summary (Last 24 hours) at 3/13/2023 0959  Last data filed at 3/13/2023 0402  Gross per 24 hour   Intake 1060 ml   Output 525 ml   Net 535 ml       Physical Exam  General:  Ill looking, awake but appears lethargic  Eyes: Conjunctivae pink,  Sclera anicteric  ENT: lips and mucous membranes moist  Neck: supple   Chest: Clear to Auscultation both lungs,  no crackles, ronchus or wheezing  CVS: S1 & S2 present, normal rate, regular rhythm, no murmur    Abdomen: soft, non-tender, non-distended, Bowel sounds normoactive, ileostomy present  Extremities: Trace edema of  legs  Skin: no rash  Neuro: awake, alert, oriented x 3   Psych: Mood and affect appropriate     Medications:    Current Facility-Administered Medications:   •  acetaminophen (TYLENOL) tablet 650 mg, 650 mg, Oral, Q4H PRN, Chrissie Medici, DO, 650 mg at 03/01/23 2158  •  alteplase (CATHFLO) injection 2 mg, 2 mg, Intracatheter, Once, Claudetta Olden, CRNP  •  aspirin chewable tablet 81 mg, 81 mg, Oral, Daily, Chrissie Medici, DO, 81 mg at 03/13/23 5842  •  atorvastatin (LIPITOR) tablet 40 mg, 40 mg, Oral, Daily, Chrissie Medici, DO, 40 mg at 03/13/23 5642  •  calcium carbonate (TUMS) chewable tablet 500 mg, 500 mg, Oral, TID PRN, GAURANG Keith, 500 mg at 03/01/23 1715  •  collagenase (SANTYL) ointment, , Topical, Daily, Chrissie Medici, DO, Given at 03/12/23 1200  •  DULoxetine (CYMBALTA) delayed release capsule 30 mg, 30 mg, Oral, Daily, [facility], DO, 30 mg at 03/13/23 7078  •  ertapenem (INVanz) 1,000 mg in sodium chloride 0 9 % 50 mL IVPB, 1,000 mg, Intravenous, Q24H, Tyrone Palencia MD, Stopped at 03/12/23 1735  •  hydrOXYzine HCL (ATARAX) tablet 10 mg, 10 mg, Oral, HS, Chrsisie Medici, DO, 10 mg at 03/12/23 2151  •  insulin lispro (HumaLOG) 100 units/mL subcutaneous injection 1-6 Units, 1-6 Units, Subcutaneous, TID AC, 1 Units at 03/12/23 1552 **AND** Fingerstick Glucose (POCT), , , 4x Daily AC and at bedtime, Chrissie Medici, DO  •  insulin lispro (HumaLOG) 100 units/mL subcutaneous injection 1-6 Units, 1-6 Units, Subcutaneous, HS, Chrissie Medici, DO, 1 Units at 03/06/23 2147  •  melatonin tablet 6 mg, 6 mg, Oral, HS, GAURANG Hernandez, 6 mg at 03/12/23 2151  •  methocarbamol (ROBAXIN) tablet 500 mg, 500 mg, Oral, BID PRN, [facility] DO, 500 mg at 03/03/23 0804  •  midodrine (PROAMATINE) tablet 5 mg, 5 mg, Oral, TID AC, GAURANG Coburn, 5 mg at 03/13/23 0603  •  ondansetron (ZOFRAN) injection 4 mg, 4 mg, Intravenous, Q4H PRN, [facility], DO, 4 mg at 03/13/23 0941  •  oxyCODONE (ROXICODONE) IR tablet 2 5 mg, 2 5 mg, Oral, Q4H PRN, Arash Salinas, DO  •  oxyCODONE (ROXICODONE) IR tablet 5 mg, 5 mg, Oral, Q4H PRN, [facility], DO, 5 mg at 03/13/23 0602  •  pantoprazole (PROTONIX) EC tablet 40 mg, 40 mg, Oral, BID AC, [facility], DO, 40 mg at 03/13/23 0603  •  simethicone (MYLICON) chewable tablet 80 mg, 80 mg, Oral, 4x Daily (with meals and at bedtime), [facility], DO, 80 mg at 03/12/23 2151  •  sodium bicarbonate tablet 1,300 mg, 1,300 mg, Oral, TID after meals, Abraham Wright MD, 1,300 mg at 03/12/23 1713  •  sodium chloride 0 9 % infusion, 75 mL/hr, Intravenous, Continuous, GAURANG Husain, Last Rate: 75 mL/hr at 03/13/23 0402, 75 mL/hr at 03/13/23 0402  •  tamsulosin (FLOMAX) capsule 0 4 mg, 0 4 mg, Oral, Daily With Judd Rosa, , 0 4 mg at 03/12/23 1554    Invasive Devices:        Lab Results:   Results from last 7 days   Lab Units 03/13/23  0608 03/12/23  0546 03/11/23  1052 03/10/23  0647 03/09/23  0647   WBC Thousand/uL 19 28* 20 27* 16 91* 16 09* 16 32*   HEMOGLOBIN g/dL 7 9* 8 2* 7 1* 7 3* 7 4*   HEMATOCRIT % 25 6* 26 3* 24 0* 23 4* 23 6*   PLATELETS Thousands/uL 325 365 345 350 357   POTASSIUM mmol/L 4 0 4 2 4 1 4 2 4 1   CHLORIDE mmol/L 104 103 103 102 105   CO2 mmol/L 22 21 20* 18* 18*   BUN mg/dL 20 20 20 20 19   CREATININE mg/dL 1 47* 1 45* 1 53* 1 38* 1 36*   CALCIUM mg/dL 8 0* 8 1* 8 0* 8 2* 8 3   ALK PHOS U/L  --   --  947* 984* 972*   ALT U/L  --   --  13 15 16   AST U/L  --   --  70* 86* 87*       Previous work up:         Portions of the record may have been created with voice recognition software  Occasional wrong word or "sound a like" substitutions may have occurred due to the inherent limitations of voice recognition software  Read the chart carefully and recognize, using context, where substitutions have occurred  If you have any questions, please contact the dictating provider

## 2023-03-13 NOTE — PROGRESS NOTES
OT Daily Treatment Note       03/13/23 1001   Pain Assessment   Pain Assessment Tool 0-10   Pain Score 5   Pain Location/Orientation Location: Abdomen   Restrictions/Precautions   Precautions Fall Risk;Fluid restriction;Contact/isolation   Lifestyle   Autonomy "I just don't think today is the day I can push it"  (referring to participating in therapy)   Grooming   Able To Initiate Tasks; Wash/Dry Face   Findings OT provided pt with wet wash cloth as pt observed to be sweating excessively  Upper Body Dressing   Comment pt observed to be in gown and scrub pants  OT offered to assist pt with getting dressed into personal clothing but pt refused stating he did not want to change  Reason if not Attempted Refused to perform   Upper Body Dressing CARE Score 7   Lower Body Dressing   Comment pt observed to be in gown and scrub pants  OT offered to assist pt with getting dressed into personal clothing but pt refused stating he did not want to change  Reason if not Attempted Refused to perform   Lower Body Dressing CARE Score 7   Roll Left and Right   Type of Assistance Needed Independent   Physical Assistance Level No physical assistance   Comment use of bedrail  Pt able to reposition self to side while OT placed pillows for pts overall comfort  Roll Left and Right CARE Score 6   Cognition   Overall Cognitive Status WFL   Arousal/Participation Alert; Cooperative   Attention Within functional limits   Orientation Level Oriented X4   Memory Within functional limits   Following Commands Follows all commands and directions without difficulty   Additional Activities   Additional Activities Other (Comment)   Additional Activities Comments time spent providing emotional support as pt was expressing concerns with the pts overall health and was discussing how poor therapy went over the weekend   pt became tearful verbalizing concern with his prognosis and has a lot of uncertainty with if/when he will heal from all of this sickness  OT provided extensive emotional support that the pt verbalized was helpful  pt then stated that without him being able to eat/drink and not feelign well, he does not want to "push it"  for therapy today  pt stated he was comfortable in bed and just wanted to rest  Dr Matthew Cui notified of pts request to not participate in therapy today  Activity Tolerance   Activity Tolerance Patient limited by fatigue   Assessment   Treatment Assessment Pt participated in brief skilled OT session with focus on repositioning, ADLs and comfort measures  See flowsheet for details of session and current functional status  Pt is limited by weakness, pain and decreased strength and extreme fatigue and requires skilled OT services to increase independence and safety with ADL completion in prep for DC home  Plan to continue ADL retraining, functional transfer training, UE strengthening/ROM, endurance training, Pt/caregiver education, compensatory technique education, continued education, energy conservation and activity engagement  to address barriers mentioned above  Prognosis Fair   Problem List Decreased strength;Decreased endurance; Impaired balance;Decreased mobility;Pain   Barriers to Discharge Inaccessible home environment;Decreased caregiver support   Plan   Treatment/Interventions ADL retraining;Functional transfer training; Therapeutic exercise; Endurance training;Patient/family training; Compensatory technique education   Progress Slow progress, medical status limitations   OT Therapy Minutes   OT Time In 1000   OT Time Out 1038   OT Total Time (minutes) 38   OT Mode of treatment - Individual (minutes) 38   OT Mode of treatment - Concurrent (minutes) 0   OT Mode of treatment - Group (minutes) 0   OT Mode of treatment - Co-treat (minutes) 0   OT Mode of Treatment - Total time(minutes) 38 minutes   OT Cumulative Minutes 1058   Therapy Time missed   Time missed?  Yes   Amount of time missed 52   Reason for time missed Extreme fatigue; Illness

## 2023-03-13 NOTE — PROGRESS NOTES
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   MRCP w/increased abscess size and R pleural effusion on 3/10    SBP low of 89 documented and symptomatic with intermittent tachycardia  On midodrine BID  No am labs returned, yesterday WBC 20 27, Hgb 8 2  UOP 575cc, ostomy 325cc    Plan:  NPO since mn  F/u IR today   Strict I/O  Pain and nausea control PRN  Rest of care per primary team    Subjective:   Patient seen and examined bedside  Stated feeling not so great this morning  No pain, nausea, emesis, or fevers  Objective:     Vitals: Blood pressure 142/82, pulse 91, temperature 98 1 °F (36 7 °C), temperature source Oral, resp  rate 16, height 5' 9" (1 753 m), weight 80 1 kg (176 lb 9 4 oz), SpO2 93 %  ,Body mass index is 26 08 kg/m²        Intake/Output Summary (Last 24 hours) at 3/13/2023 0618  Last data filed at 3/13/2023 0402  Gross per 24 hour   Intake 1180 ml   Output 900 ml   Net 280 ml       Physical Exam:   General - no acute distress, responsive and cooperative  CV - warm, regular rate  Pulm - normal work of breathing, no respiratory distress  Abd - soft, nondistended, ostomy viable with stool per bag, midline wound with some fibrinous slough on edge, right abdominal drain capped  Neuro - m/s grossly intact, cn grossly intact  Ext - moving all extremities       Invasive Devices     Central Venous Catheter Line  Duration           Port A Cath 03/10/22 Right Chest 367 days          Drain  Duration           Ileostomy  days    Cholecystostomy Tube 16 days                Lab, Imaging and other studies: I have personally reviewed pertinent reports      VTE Pharmacologic Prophylaxis: Enoxaparin (Lovenox)  VTE Mechanical Prophylaxis: sequential compression device

## 2023-03-13 NOTE — WOUND OSTOMY CARE
Progress Note - Wound   Marikay Prudent 61 y o  male MRN: [id number]  Unit/Bed#: -94 Encounter: [id number]        Assessment:   Patient is seen for wound care follow-up  Patient refused assessment of sacro-buttocks related to fatigue  Patient fell asleep during assessment  Patient currently NPO on fluids for procedure  Patient lying supine on regular mattress  Patient's ostomy pouch is well adhered with no signs of leaking  Findings:  B/L heels are dry intact and ramon with no skin loss or wounds present  Recommend preventative Hydraguard Cream and proper offloading/ repositioning  1  Midline Incision: irregular in shape area of full thickness skin loss  Wound bed is 10% moist yellow slough tissue and 90% beefy red granulation tissue  James-wound is intact scar tissue  Scant sero-sanguinous drainage noted  Santyl was applied to yellow slough tissue only and entire area was covered with an ABD and secured with minimal tape  Ambulatory referral in place for continued management of area once discharged  2  Upper Left Abdomen Wound: area irregular in shape with full thickness skin loss  Area located proximal to midline incision wound  Patient previously had old, healed, intact, incision at site  Wound bed is 100% moist yellow slough tissue  Cannot appreciate true depth related to slough tissue obscuring wound bed  James-wound is erythematic  Moderate amount of yellow pus excreted from wound  Pus was able to be excreted when pressure was applied to james-wound  Recommend maxorb ag and abd  Attending made aware of findings  No induration, fluctuance, odor, warmth/temperature differences, redness, or purulence noted to the above noted wounds and skin areas assessed  New dressings applied per orders listed below  Patient tolerated well- no s/s of non-verbal pain or discomfort observed during the encounter  Bedside nurse aware of plan of care   See flow sheets for more detailed assessment findings  Orders listed below and wound care will continue to follow, call or tiger text with questions  Skin Care Plan:  1-Midline Incision: Cleanse wound with NS and pat dry  Apply Santyl to [facility] Tissue Only in a Nickel Thick Layer  Cover with an ABD and secure with minimal tape  Change daily or PRN soilage or displacement    2-Turn/reposition q2h or when medically stable for pressure re-distribution on skin   3-Elevate heels to offload pressure  4-Moisturize skin daily with skin nourishing cream  5-Ehob cushion in chair when out of bed  6-Preventative Hydraguard to bilateral heels and sacro-buttocks BID and PRN     7-Ambulatory Referral to outpatient wound center for continued management of midline incision wound  Referral Placed  8-Left Upper Abdomen Wound: Cleanse wound with NS and pat dry  Apply maxorb AG to wound bed and cover with an ABD, secure with minimal tape  Change daily or PRN soilage or displacement  WOUNDS:  Wound 11/07/22 Abdomen N/A (Active)   Wound Image   03/13/23 0806   Wound Description Yellow;Slough; Beefy red;Granulation tissue 03/13/23 0806   Nisa-wound Assessment Scar Tissue 03/13/23 0806   Wound Length (cm) 10 8 cm 03/13/23 0806   Wound Width (cm) 3 cm 03/13/23 0806   Wound Depth (cm) 0 3 cm 03/13/23 0806   Wound Surface Area (cm^2) 32 4 cm^2 03/13/23 0806   Wound Volume (cm^3) 9 72 cm^3 03/13/23 0806   Calculated Wound Volume (cm^3) 9 72 cm^3 03/13/23 0806   Change in Wound Size % 98 01 03/13/23 0806   Tunneling 0 cm 03/13/23 0806   Tunneling in depth located at 0 03/13/23 0806   Undermining 0 03/13/23 0806   Undermining is depth extending from 0 03/13/23 0806   Wound Site Closure DONNA 03/13/23 0806   Drainage Amount Scant 03/13/23 0806   Drainage Description Serosanguineous 03/13/23 0806   Non-staged Wound Description Full thickness 03/13/23 0806   Treatments Cleansed;Irrigation with NSS;Site care 03/13/23 0806   Dressing Enzymatic debrider;ABD 03/13/23 0806 Wound packed? No 03/13/23 0806   Packing- # removed 0 03/13/23 0806   Packing- # inserted 0 03/13/23 0806   Dressing Changed Changed 03/13/23 0806   Patient Tolerance Tolerated well 03/13/23 0806   Dressing Status Intact;Dry;Clean 03/13/23 0806       Wound 02/25/23 Other (comment) Abdomen Left;Upper; Outer (Active)   Wound Image   03/13/23 0805   Wound Description Slough; Yellow 03/13/23 0805   Nisa-wound Assessment Erythema 03/13/23 0805   Wound Length (cm) 0 5 cm 03/13/23 0805   Wound Width (cm) 1 4 cm 03/13/23 0805   Wound Depth (cm) 0 8 cm 03/13/23 0805   Wound Surface Area (cm^2) 0 7 cm^2 03/13/23 0805   Wound Volume (cm^3) 0 56 cm^3 03/13/23 0805   Calculated Wound Volume (cm^3) 0 56 cm^3 03/13/23 0805   Wound Site Closure DONNA 03/13/23 0805   Drainage Amount Moderate 03/13/23 0805   Drainage Description Yellow;Purulent 03/13/23 0805   Non-staged Wound Description Full thickness 03/13/23 0805   Treatments Cleansed;Irrigation with NSS;Site care 03/13/23 0805   Dressing Calcium Alginate with Silver;ABD 03/13/23 0805   Wound packed? No 03/13/23 0805   Dressing Changed New 03/13/23 0805   Patient Tolerance Tolerated well 03/13/23 0805   Dressing Status Clean;Dry; Intact 03/13/23 0805                Isaías Thakkar RN, BSN

## 2023-03-13 NOTE — PROGRESS NOTES
PM&R PROGRESS NOTE:  Alexander Hinson 61 y o  male MRN: [id number]  Unit/Bed#: -82 Encounter: [id number]        Rehabilitation Diagnosis: Impairment of mobility, safety and Activities of Daily Living (ADLs) due to Debility:  16  Debility (Non-cardiac/Non-pulmonary)    HPI: Jm Menard is a 61 y o  male with a history of hyperlipidemia, hypertension, GERD, ventral hernia, colon adenocarcinoma that initially presented to [facility] for an elective surgical procedure with Dr Gabrielle Darnell on 11/7/22  Patient presented to hem/onc on  9/22 when CT abdomen revealed transverse colonic apple core lesion and innumerable hepatic metastases  MRI revealed multiple hepati metastasis with smaller lesions in left lobe and larger lesions on the right lobe  On 11/7, patient underwent exploratory laparotomy, segment 3 liver resection, ablation, intraoperative ultrasound of the liver  This was complicated by left upper quadrant hematoma, imaging showed suspected leak from transverse colon anastomosis  On 11/16, patient underwent exploratory lap revealing hematoma, defect in transverse colon anastomosis with extravasation of succus and a dilated cecum requiring resection  He had a right hemicolectomy, left in discontinuity and temporary abdominal closure device  On 11/17, patient underwent re-exploratory, partial descending colectomy, primary colonic anastomosis created with diverting loop ileostomy and midline wound vac placement  An intra-abdominal abscess was positive for ESBL and patient completed prolonged course of antibiotics  He was discharged to SNF on 12/3 and returned to hospital on 12/4 for increased abdominal pain  Patient monitored off of antibiotics  On 12/6 patient was noted with an increased creatinine level, received IVF bolus with improvement  Patient developed tachycardia, increased abdominal pain, and incision with yellow drainage noted  CT AP showed abdominal abscess and distended gallbladder   ID was consulted, patient was continued on IV antibiotics through 12/15  Patient went to IR for percutaneous cholecystostomy tube insertion and hepatectomy abscess drainage  Nephrology consulted and initiated sodium bicarb gtt and IV albumin  On 12/14, patient midline/left chest wall pain, below abscess drain  Cardiac work-up completed  Troponin level 57 and CXR showed progressive bilateral opacities suspicious for CHF and small left pleural effusion  Cariology consulted with chest pain appearing musculoskeletal no further work up warranted  Patient was deemed medically stable and admitted to [address] [place] [address] on 12/14/22  He was discharged from [place] on 01/17/23  Patient present to [facility] on 02/07 with increased weakness for two weeks with 2 falls noted  No injury noted  He was found to be septic with the main source of midline abdominal wound which was with foul smelling drainage  CT showed improvement in fluid collection at the left liver segment, perisplenic, left retroperitoneum, interior to the spleen  Patient was treated with broad spectrum antibiotics and eventually placed on Ertapenem  Hospital course complicated by fevers and leukocytosis  Repeat imaging concerning for cholecystitis  Patient underwent cystostomy tube exchange and Pattock drain was removed on 02/24  The patient was evaluated by the Rehabilitation team and deemed an appropriate candidate for an inpatient acute rehabilitation program  Patient was admitted on 02/24/23  SUBJECTIVE: Patient seen face to face  No acute issues overnight  Patient feeling tired and depressed  Reports pain to abdomen and sternum  Current pain medication regimen adequate to control pain  Voiding, ileostomy with brown, soft unformed stool  Abdomen midline wound pink with slough at edges  Midline drain site with purulent drainage  Denies chest pain, shortness of breath, fever, chills, nausea, abdominal pain        MRCP: large right pleural effusion, small left pleural effusion; negative for choledocholithiasis, innumerable hepatic metastatic lesions, increased size of T2 hyperintense focus at the left hepatic lobe now measuring 5 1 cm consistent with enlarging abscess cavity following recent removal of drainage catheter  24 hour total  Cholecystostomy drain- capped  Ileostomy-  325 cc     ASSESSMENT: Stable    PLAN:  - NPO pending planned IR thoracentesis and abdominal abscess drain placement  - WBC 19 32, IV abx-ertapenem daily, ID following   - abdominal wounds, wound care following continue with wet to dry dressing to midline wound; Maxsorb Ag cover with ABD  - consider transfer back to acute care for further treatment vs plan for home with services  - palliative to see patient today  - Continue with current rehabilitation and medical plan of care  Rehabilitation  • Functional deficits:  Mobility, self care  • Continue current rehabilitation plan of care to maximize function  • Functional update:   · PT: mobility, transfers, and ambulation- independent with RW  · OT: ADL- incidental touching for bathing, lower body dressing- min A  • Estimated Discharge: TBD    Pain  • Tylenol, oxycodone    DVT prophylaxis  • Lovenox    Bladder plan  • Continent    Bowel plan  • Ileostomy    Malignant neoplasm of transverse colon [facility])  Assessment & Plan  Complex history with chronological details below:    2/22: S/P diverting colostomy, liver biopsy +Chemo tx  11/7/22: Hepatic resection and colostomy reversal  11/16/22: Exploratory laparotomy with bowel resection and VAC placement   11/17/22: Right hemicolectomy, partial descending colon resection, colocolonic anastomosis with loop ileosotmy and midline VAC placement  12/20/22: Drains placed, total of 3 drains + perc radha tube + ileostomy  1/31/23: 2 of 3 drains removed  This hospital stay had fever/leukocytosis/sepsis, started Meropenem and then to Vancomycin and Ertapenem  Wound cx again = Ecoli ESBL   LD Ertapenem 2/14, Vancomycin 2/16 2/24: Abscessogram of midline drain showed minimal persistent collection and the midline catheter was removed  Perc Alvina tube exchanged  03/06- pending IR cholecystostomy tube check    • Patient does not wish to follow [facility] anymore as it is too far of a drive; would like to establish with [place] [facility] (already follows with Dr Avila Sears)  • Follows with Dr Marybeth Couch as outpt      * Sepsis [facility])  Assessment & Plan  Noted the development of foul-smelling drainage and increasing pain from his chronic abdominal wound with leukocytosis, tachypnea, tachycardia  • Purulent drainage from abdominal wound  • Hx of colon CA w/ extensive abd surgical- adenocarcinoma of transverse colon section  • Patient has several abdominal drains in place, with CT showing improvement in the fluid collections at the left liver lobe, perisplenic area, and the left retroperitoneum inferior to the spleen  • Completed Vancomycin and Ertapenem  • S/p bedside midline wound debridement + wet dressing 2/8 with general surgery  • S/p IR tube check 2/17/2023, drains kept in place  Discussed with IR, follow up in 1 month  • Given ongoing low grade fever and worsening leukocytosis, ordered repeat blood cultures and repeat CT a/p    CT a/p showing moderate R pleural effusion, imaging concerning for cholecystitis  Discussed case with surgery, IR  IR will reposition drain today- Alvina tube check showed retraction of the alvina tube   Interval occlusion of the cystic duct   Dino-enteric fistula persisted  Exchange of alvina tube 2/24  • Continue to monitor off of antibiotics, low threshold to re-consult ID, but stable at this time  • Debility from Sepsis in addition to 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  • Pain control, anxiety management  • PT/OT  • 03/06 WBC 15 85, Hbg 7 7, Na+ 128, creatinine 1 66, total bilirubin, AST/ALT/Alk Phos elevated    • Consulted GI, deferred to IR for tube check  • IVF NS 1L  - Cholecystostomy drain check findings: diagnostic cholangiogram demonstrating filling of cystic and common bile duct, minimal pain, no leaking, slight adjustment of tube further into gallbladder body  Tube capped, can consider removal if patient passes capping trial in 2 weeks and if surgery is in agreement  - c/s Surgical Oncology, Dr Mark Anthony Hanley in agreement with IR recommendations  - GI consulted, recommending MRI/MRCP r/o obstruction 03/09, trend LFT  -nephrology consulted- sodium bicarb 1300 mg BID, d/c sodium tabs, midodrine 5 mg  - MRCP- showed increased 5 1 cm abscess cavity; plan for IR 3/13 for abscess drain and right thoracentesis  - ID started Ertapenem 03/12      Chronic bilateral pleural effusions  Assessment & Plan  Hx of pleural effusions with   Interval worsening of moderate right and small left pleural effusions  There is associated bibasilar atelectasis based on CTAP on 2/22  Monitor oxygen and breathing, may benefit from thoracentesis    Depression  Assessment & Plan  Continue Cymbalta  Provide supportive counseling and encouragement, easily tearful  Neuropsychology consultation for support    Cholecystitis, unspecified  Assessment & Plan  Subacute at this point, but still requiring per radha tube  • s/p percutaneous tube placement 12/8/22 asnd recent exchange on 2/24/23  • CT scan was concerning for cholecystitis --> to IR 2/24 = showed retraction of the radha tube and interval occlusion of the cystic duct   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  Radha tube was exchanged  • Monitor output and pain  • 3/7 cholecystostomy drain check- advanced slightly and capped; consider removal in 2 wks if tolerates capping    • Dr Mark Anthony Hanley in agreement    Abdominal wound dehiscence  Assessment & Plan  Midline abdominal wound with initially foul smelling drainage  Now improved per patient with red granular base and slough noted  Santyl and wet to dry dressing  Consult wound care   Daily dressing changes    Nephrolithiasis  Assessment & Plan  RUQ US revealed 7 mm nonobstructing right intrarenal calculus  No hydronephrosis noted on CT a/p  Asymptomatic at this time  • Flomax  • Monitor outpt, symptoms    Hyponatremia  Assessment & Plan  • Mild, most recently 131   • [place] likely2/2 SIADH in setting of malignancy  • Sodium bicarbonate 1300 mg BID  • Monitor BMP  • FR 1800    Elevated alkaline phosphatase level  Assessment & Plan  Chronically elevated likely in setting of known hepatic metastasis  • RUQ US Decompressed gallbladder around a cholecystostomy tube, limiting evaluation  Hepatomegaly  Heterogeneous echotexture of the liver in this patient with known metastatic disease  7 mm nonobstructing right intrarenal calculus  • Monitor with am CMP  • GI consulted; MRI/MRCP to r/o obstruction, negative for obstruction    Leukocytosis  Assessment & Plan  WBC 19 28  Mildly above normal, trendiong down, however has a history of spiking and dropping  Monitor labs however need to follow clinical exam and vitals as it may spike and drop again  Monitor off antibiotics  ID following  If Temp >101 blood cultures and consider CT A/P  R/o C diff 3/8, negative  - Ertapenem daily started 3/12    Abscess  Assessment & Plan  Smaller abscesses on recent studies now with no active drains  At risk for further complications  Lower theshold for abdominal CT if develops fevers or leukocytosis  MRCP revealed abscess cavity increasing 5 1cm, plan for IR drain placement 3/13     Acute on chronic kidney failure [facility])  Assessment & Plan  • POA with creatinine 2 52; baseline 1-1 3  • Suspecting possible multifactorial cause in setting of dehydration and sepsis  • Improving back into baseline levels, Continue to monitor on labs, minimize any relative hypotension  Avoid nephrotoxic agents    • Creatinine 1 47 (3/13)    Anemia  Assessment & Plan  Hbg 7 9 (previous 8 2, 7 1, 7 4, 9 5, 8 3)  Required transfusion on 2/16  Order type and screen, 1 unit pRBC (2/26)  - received 1 unit pRBC for symptomatic Hbg 7 1 3/11  Monitor CBC    Mixed hyperlipidemia  Assessment & Plan  Continue statin    Benign essential hypertension  Assessment & Plan  Monitor pressures in therapy    Type 2 diabetes mellitus without complication, with long-term current use of insulin [facility])  Assessment & Plan  Lab Results   Component Value Date    HGBA1C 8 0 (H) 09/26/2022       Recent Labs     02/24/23  0839 02/24/23  1137 02/24/23  1626 02/24/23  2040   POCGLU 106 105 152* 120     • Home:  Lantus 8U qam/Lispro 4U TID  • Here:  Lantus 5U qhs/Lispro 5U TID, SSI  • Has a DEXCOM meter and a regular meter at home  • Continue DM diet and QID Accuchecks/SSI    Appreciate IM consultants medical co-management  Personally reviewed labs, medications, and imaging  ROS:  Review of Systems   A 10 point review of systems was negative except for what is noted in the HPI  OBJECTIVE:   /82 (BP Location: Right arm)   Pulse 91   Temp 98 1 °F (36 7 °C) (Oral)   Resp 16   Ht 5' 9" (1 753 m)   Wt 80 1 kg (176 lb 9 4 oz)   SpO2 93%   BMI 26 08 kg/m²     Physical Exam  Constitutional:       Appearance: Normal appearance  HENT:      Head: Normocephalic and atraumatic  Mouth/Throat:      Mouth: Mucous membranes are moist    Cardiovascular:      Rate and Rhythm: Normal rate and regular rhythm  Pulses: Normal pulses  Heart sounds: Normal heart sounds  Pulmonary:      Effort: Pulmonary effort is normal       Breath sounds: Normal breath sounds  Abdominal:      General: Bowel sounds are normal       Palpations: Abdomen is soft  Tenderness: There is abdominal tenderness  Comments: + ileostomy, cholecystostomy drain (capped)   Musculoskeletal:         General: Normal range of motion  Cervical back: Normal range of motion  Right lower leg: Edema present  Left lower leg: Edema present  Skin:     General: Skin is warm and dry        Capillary Refill: Capillary refill takes less than 2 seconds  Findings: Bruising present  Neurological:      Mental Status: He is alert and oriented to person, place, and time  Motor: Weakness present          Lab Results   Component Value Date    WBC 19 28 (H) 03/13/2023    HGB 7 9 (L) 03/13/2023    HCT 25 6 (L) 03/13/2023    MCV 85 03/13/2023     03/13/2023     Lab Results   Component Value Date    SODIUM 131 (L) 03/13/2023    K 4 0 03/13/2023     03/13/2023    CO2 22 03/13/2023    BUN 20 03/13/2023    CREATININE 1 47 (H) 03/13/2023    GLUC 110 03/13/2023    CALCIUM 8 0 (L) 03/13/2023     Lab Results   Component Value Date    INR 1 43 (H) 03/11/2023    INR 1 32 (H) 02/07/2023    INR 1 12 12/19/2022    PROTIME 17 7 (H) 03/11/2023    PROTIME 16 1 (H) 02/07/2023    PROTIME 14 7 (H) 12/19/2022       Current Facility-Administered Medications:   •  acetaminophen (TYLENOL) tablet 650 mg, 650 mg, Oral, Q4H PRN, Andrew Alavrez DO, 650 mg at 03/01/23 2158  •  alteplase (CATHFLO) injection 2 mg, 2 mg, Intracatheter, Once, GAURANG Nevarez  •  aspirin chewable tablet 81 mg, 81 mg, Oral, Daily, Andrew Alvarez DO, 81 mg at 03/13/23 3327  •  atorvastatin (LIPITOR) tablet 40 mg, 40 mg, Oral, Daily, Andrew Kim DO, 40 mg at 03/13/23 3434  •  calcium carbonate (TUMS) chewable tablet 500 mg, 500 mg, Oral, TID PRN, GAURANG Keith, 500 mg at 03/01/23 1715  •  collagenase (SANTYL) ointment, , Topical, Daily, Andrew Alvarez DO, Given at 03/12/23 1200  •  DULoxetine (CYMBALTA) delayed release capsule 30 mg, 30 mg, Oral, Daily, Andrew Alvarez DO, 30 mg at 03/13/23 9741  •  ertapenem (INVanz) 1,000 mg in sodium chloride 0 9 % 50 mL IVPB, 1,000 mg, Intravenous, Q24H, Patricia Moe MD, Stopped at 03/12/23 1738  •  hydrOXYzine HCL (ATARAX) tablet 10 mg, 10 mg, Oral, HS, Andrew Alvarez DO, 10 mg at 03/12/23 2151  •  insulin lispro (HumaLOG) 100 units/mL subcutaneous injection 1-6 Units, 1-6 Units, Subcutaneous, TID AC, 1 Units at 03/12/23 1552 **AND** Fingerstick Glucose (POCT), , , 4x Daily AC and at bedtime, Leveda Siobhan, DO  •  insulin lispro (HumaLOG) 100 units/mL subcutaneous injection 1-6 Units, 1-6 Units, Subcutaneous, HS, Leveda Siobhan, DO, 1 Units at 03/06/23 2147  •  melatonin tablet 6 mg, 6 mg, Oral, HS, Wilhemenia Cortezes, CRNP, 6 mg at 03/12/23 2151  •  methocarbamol (ROBAXIN) tablet 500 mg, 500 mg, Oral, BID PRN, Leveda Siobhan, DO, 500 mg at 03/03/23 9182  •  midodrine (PROAMATINE) tablet 5 mg, 5 mg, Oral, TID AC, Georgeana Sever Harleman, CRNP, 5 mg at 03/13/23 9712  •  ondansetron [facility]) injection 4 mg, 4 mg, Intravenous, Q4H PRN, Leveda Siobhan, DO, 4 mg at 03/13/23 0941  •  oxyCODONE (ROXICODONE) IR tablet 2 5 mg, 2 5 mg, Oral, Q4H PRN, Leveda Siobhan, DO  •  oxyCODONE (ROXICODONE) IR tablet 5 mg, 5 mg, Oral, Q4H PRN, Leveda Siobhan, DO, 5 mg at 03/13/23 0602  •  pantoprazole (PROTONIX) EC tablet 40 mg, 40 mg, Oral, BID AC, Leveda Siobhan, DO, 40 mg at 03/13/23 0603  •  simethicone (MYLICON) chewable tablet 80 mg, 80 mg, Oral, 4x Daily (with meals and at bedtime), Leveda Siobhan, DO, 80 mg at 03/12/23 2151  •  sodium bicarbonate tablet 1,300 mg, 1,300 mg, Oral, TID after meals, Kodi Gray MD, 1,300 mg at 03/13/23 1010  •  sodium chloride 0 9 % infusion, 75 mL/hr, Intravenous, Continuous, Wilhemenia Hoes, CRNP, Last Rate: 75 mL/hr at 03/13/23 0402, 75 mL/hr at 03/13/23 0402  •  tamsulosin (FLOMAX) capsule 0 4 mg, 0 4 mg, Oral, Daily With Annamarie Little DO, 0 4 mg at 03/12/23 1554    Past Medical History:   Diagnosis Date   • Abdominal pain 03/12/2022   • Acute renal failure ([facility] )     [id number] resolved   • Acute respiratory failure with hypoxia ([place] [address] ) 11/12/2022   • Bacteremia 11/12/2022   • Cancer ([place] [address] )    • Diabetes mellitus ([place] [address] )    • Enteritis 08/23/2016   • Flash pulmonary edema ([place] [address] ) 11/12/2022   • Gastroparesis due to DM ([place] [address] ) 08/23/2016   • GERD (gastroesophageal reflux disease)    • Hernia, ventral 08/04/2016   • Hyperlipidemia    • Hypertension    • Morbid obesity ([place] [address] ) 04/17/2018   • Postoperative visit 03/02/2022   • SIRS (systemic inflammatory response syndrome) ([place] [address] ) 03/12/2022   • Snoring    • Stage 3a chronic kidney disease ([place] [address] ) 02/19/2022       Patient Active Problem List    Diagnosis Date Noted   • Malignant neoplasm of transverse colon ([place] [address] ) 03/01/2022   • Sepsis ([place] [address] ) 12/17/2022   • 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 liver ([place] [address] ) 03/02/2022   • Abdominal wound dehiscence 02/24/2023   • Cholecystitis, unspecified 02/24/2023   • Depression 02/24/2023   • Chronic bilateral pleural effusions 02/24/2023   • Nephrolithiasis 02/09/2023   • Abnormal CT scan 02/07/2023   • Hyponatremia 02/07/2023   • Dehiscence of incision 12/30/2022   • Elevated alkaline phosphatase level 12/30/2022   • Leukocytosis 12/29/2022   • Abscess 12/27/2022   • Acute pain 12/27/2022   • Severe protein-calorie malnutrition ([place] [address] ) 12/14/2022   • Acute on chronic kidney failure ([place] [address] ) 12/14/2022   • MR (mitral regurgitation) 11/12/2022   • ESBL (extended spectrum beta-lactamase) producing bacteria infection 11/12/2022   • Encephalopathy 11/09/2022   • Cervical radiculopathy 10/26/2022   • Other fatigue 06/15/2022   • Colostomy prolapse ([place] [address] ) 05/27/2022   • Colon cancer metastasized to liver ([place] [address] ) 03/12/2022   • Iron deficiency anemia, unspecified 03/01/2022   • Thrombocytosis 02/21/2022   • Hypokalemia 02/19/2022   • Transaminitis 02/19/2022   • Type 2 diabetes mellitus with hyperlipidemia ([place] [address] ) 02/18/2022   • Anemia 02/18/2022   • Left ureteral calculus 01/30/2020   • Incarcerated umbilical hernia 30/49/1903   • Testicular hypogonadism 06/19/2017   • Low testosterone 05/30/2017   • Type 2 diabetes mellitus without complication, with long-term current use of insulin ([facility] ) 08/23/2016   • Benign essential hypertension 08/23/2016   • Mixed hyperlipidemia 08/23/2016   • Erectile dysfunction 07/11/2016   • Obesity (BMI 30-39 9) 07/11/2016 GAURANG Tobar  Physical Medicine and Chris

## 2023-03-13 NOTE — SEDATION DOCUMENTATION
IR right thoracentesis by Dr Pradhan  1600cc of diane fluid removed  Fluid sent for labs  Bandaid placed on site   Will proceed with drainage tube placement next

## 2023-03-13 NOTE — PROGRESS NOTES
03/13/23 1430   Assessment   Treatment Assessment PT arrived to pt's room and pt being taken for medical procedure  Will cont with POC when pt medically stable to reassess functional mobility to be sure pt safe to d/c home once medically stable  Therapy Time missed   Time missed? Yes   Amount of time missed 60   Reason for time missed Medical procedure; Extreme fatigue; Illness

## 2023-03-13 NOTE — DISCHARGE INSTRUCTIONS
Thoracentesis   WHAT YOU NEED TO KNOW:   A thoracentesis is a procedure to remove extra fluid or air from between your lungs and your inner chest wall  Air or fluid buildup may make it hard for you to breathe  A thoracentesis allows your lungs to expand fully so you can breathe more easily  DISCHARGE INSTRUCTIONS:     Small amount of shoulder pain and bloody sputum is normal after a Thoracentesis  Rest:  Rest when you feel it is needed  Slowly start to do more each day  Return to your daily activities as directed  Resume your normal diet  Small sips of flat soda will help mild nausea  Do not smoke: If you smoke, it is never too late to quit  Ask for information about how to stop smoking if you need help  Contact Interventional Radiology at [phone] Tabitha PATIENTS: Contact Interventional Radiology at [phone]) Judd Brown PATIENTS: Contact Interventional Radiology at [phone]) if:   You have a fever  Your puncture site is red, warm, swollen, or draining pus  You have questions or concerns about your procedure, medicine, or care  Seek care immediately or call 911 if:   Severe chest pain with inspiration and shortness of breath    Large amounts of blood in your sputum    Follow up with your healthcare provider as directed  TUBE CARE INSTRUCTIONS    Care after your procedure:    Resume your normal diet  Small sips of flat soda will help with nausea  1  The properly functioning catheter should be forward flushed once (1x) daily with 10ml of normal saline using clean technique  You will be given a prescription for flushes  To flush the tube, clean both connections with alcohol swab  Twist off the drainage bag/ bulb  tubing and twist the saline syringe into the drainage tube and flush  Remove the syringe and twist the drainage bag / bulb tubing tubing back on     2  The drainage bag/bulb may be emptied as necessary   Keep a record of the amount of fluid you drain from your tube  This should be done with clean technique as well  3  A fresh dressing should be applied daily over the tube insertion site  4  As the tube is secured to the skin with only a suture,try not to pull on your tube  Tub baths are not permitted  Showers are permitted if the patient's skin entry site is prevented from getting wet  Similarly, washcloth "baths" are acceptable  Contact Interventional Radiology at [phone] Tabitha PATIENTS: Contact Interventional Radiology at [phone]) Crystal Briones PATIENTS: Contact Interventional Radiology at [phone]) if:    1  Leakage or large amounts of liquid around the catheter  2  Fever of 101 degrees lasting several hours without other obvious cause (such as sore throat, flu, etc)  3  Persistent nausea or vomiting  4  Diminished drainage, which may be associated with pressure or pain  Or when the     drainage from your tube is less than 10mls for 48 hours  5  Catheter pulled back or falls out  The following pharmacies carry the flush syringes  [facility]                     [facility], [place]  [address]  Phone [phone]            Phone [phone] [place] [address]             [address] [facility]                                [phone]  [address] [facility] Amrik DEJESUS                      [address] [facility]  Phone [phone]            Phone [phone]                      [facility]                                                                                                          [phone]  [facility]  [address] [facility]  Phone [phone]

## 2023-03-14 ENCOUNTER — APPOINTMENT (OUTPATIENT)
Dept: RADIOLOGY | Facility: HOSPITAL | Age: 58
End: 2023-03-14

## 2023-03-14 VITALS
BODY MASS INDEX: 26.16 KG/M2 | OXYGEN SATURATION: 95 % | TEMPERATURE: 98.1 F | SYSTOLIC BLOOD PRESSURE: 130 MMHG | RESPIRATION RATE: 17 BRPM | DIASTOLIC BLOOD PRESSURE: 77 MMHG | HEART RATE: 109 BPM | WEIGHT: 176.59 LBS | HEIGHT: 69 IN

## 2023-03-14 PROBLEM — T81.43XA POSTPROCEDURAL INTRAABDOMINAL ABSCESS: Status: ACTIVE | Noted: 2023-03-14

## 2023-03-14 PROBLEM — C18.9 COLON CANCER (HCC): Status: ACTIVE | Noted: 2023-03-14

## 2023-03-14 PROBLEM — J90 PLEURAL EFFUSION: Status: ACTIVE | Noted: 2023-03-14

## 2023-03-14 PROBLEM — K65.1 POSTPROCEDURAL INTRAABDOMINAL ABSCESS (HCC): Status: ACTIVE | Noted: 2023-01-01

## 2023-03-14 PROBLEM — K81.9 CHOLECYSTITIS: Status: ACTIVE | Noted: 2023-03-14

## 2023-03-14 PROBLEM — E83.42 HYPOMAGNESEMIA: Status: ACTIVE | Noted: 2023-03-14

## 2023-03-14 PROBLEM — N18.30 STAGE 3 CHRONIC KIDNEY DISEASE (HCC): Status: ACTIVE | Noted: 2023-03-14

## 2023-03-14 LAB
ANION GAP SERPL CALCULATED.3IONS-SCNC: 7 MMOL/L (ref 4–13)
BASOPHILS # BLD AUTO: 0.08 THOUSANDS/ÂΜL (ref 0–0.1)
BASOPHILS NFR BLD AUTO: 1 % (ref 0–1)
BUN SERPL-MCNC: 19 MG/DL (ref 5–25)
CALCIUM SERPL-MCNC: 7.9 MG/DL (ref 8.3–10.1)
CHLORIDE SERPL-SCNC: 105 MMOL/L (ref 96–108)
CO2 SERPL-SCNC: 21 MMOL/L (ref 21–32)
CREAT SERPL-MCNC: 1.39 MG/DL (ref 0.6–1.3)
EOSINOPHIL # BLD AUTO: 0 THOUSAND/ÂΜL (ref 0–0.61)
EOSINOPHIL NFR BLD AUTO: 0 % (ref 0–6)
ERYTHROCYTE [DISTWIDTH] IN BLOOD BY AUTOMATED COUNT: 17.4 % (ref 11.6–15.1)
GFR SERPL CREATININE-BSD FRML MDRD: 55 ML/MIN/1.73SQ M
GLUCOSE P FAST SERPL-MCNC: 124 MG/DL (ref 65–99)
GLUCOSE SERPL-MCNC: 123 MG/DL (ref 65–140)
GLUCOSE SERPL-MCNC: 124 MG/DL (ref 65–140)
GLUCOSE SERPL-MCNC: 127 MG/DL (ref 65–140)
GLUCOSE SERPL-MCNC: 127 MG/DL (ref 65–140)
GLUCOSE SERPL-MCNC: 138 MG/DL (ref 65–140)
HCT VFR BLD AUTO: 27.2 % (ref 36.5–49.3)
HGB BLD-MCNC: 8.6 G/DL (ref 12–17)
IMM GRANULOCYTES # BLD AUTO: 0.26 THOUSAND/UL (ref 0–0.2)
IMM GRANULOCYTES NFR BLD AUTO: 2 % (ref 0–2)
LYMPHOCYTES # BLD AUTO: 0.99 THOUSANDS/ÂΜL (ref 0.6–4.47)
LYMPHOCYTES NFR BLD AUTO: 6 % (ref 14–44)
MAGNESIUM SERPL-MCNC: 1 MG/DL (ref 1.6–2.6)
MCH RBC QN AUTO: 26.2 PG (ref 26.8–34.3)
MCHC RBC AUTO-ENTMCNC: 31.6 G/DL (ref 31.4–37.4)
MCV RBC AUTO: 83 FL (ref 82–98)
MONOCYTES # BLD AUTO: 1.12 THOUSAND/ÂΜL (ref 0.17–1.22)
MONOCYTES NFR BLD AUTO: 7 % (ref 4–12)
NEUTROPHILS # BLD AUTO: 14.83 THOUSANDS/ÂΜL (ref 1.85–7.62)
NEUTS SEG NFR BLD AUTO: 84 % (ref 43–75)
NRBC BLD AUTO-RTO: 0 /100 WBCS
PLATELET # BLD AUTO: 320 THOUSANDS/UL (ref 149–390)
PMV BLD AUTO: 9.5 FL (ref 8.9–12.7)
POTASSIUM SERPL-SCNC: 4.2 MMOL/L (ref 3.5–5.3)
RBC # BLD AUTO: 3.28 MILLION/UL (ref 3.88–5.62)
RHODAMINE-AURAMINE STN SPEC: NORMAL
SODIUM SERPL-SCNC: 133 MMOL/L (ref 135–147)
WBC # BLD AUTO: 17.28 THOUSAND/UL (ref 4.31–10.16)

## 2023-03-14 RX ORDER — METHOCARBAMOL 500 MG/1
500 TABLET, FILM COATED ORAL 2 TIMES DAILY PRN
Refills: 0 | Status: ON HOLD
Start: 2023-03-14

## 2023-03-14 RX ORDER — ACETAMINOPHEN 325 MG/1
650 TABLET ORAL EVERY 4 HOURS PRN
Refills: 0 | Status: ON HOLD
Start: 2023-03-14

## 2023-03-14 RX ORDER — OXYCODONE HYDROCHLORIDE 5 MG/1
2.5 TABLET ORAL EVERY 4 HOURS PRN
Refills: 0 | Status: ON HOLD
Start: 2023-03-14 | End: 2023-03-24

## 2023-03-14 RX ORDER — SODIUM BICARBONATE 650 MG/1
1300 TABLET ORAL
Refills: 0 | Status: ON HOLD
Start: 2023-03-14

## 2023-03-14 RX ORDER — ASPIRIN 81 MG/1
81 TABLET, CHEWABLE ORAL DAILY
Refills: 0 | Status: ON HOLD
Start: 2023-03-14

## 2023-03-14 RX ORDER — MAGNESIUM SULFATE HEPTAHYDRATE 40 MG/ML
4 INJECTION, SOLUTION INTRAVENOUS ONCE
Status: DISCONTINUED | OUTPATIENT
Start: 2023-03-14 | End: 2023-03-14 | Stop reason: HOSPADM

## 2023-03-14 RX ORDER — OXYCODONE HYDROCHLORIDE 5 MG/1
5 TABLET ORAL EVERY 4 HOURS PRN
Refills: 0 | Status: ON HOLD
Start: 2023-03-14 | End: 2023-03-24

## 2023-03-14 RX ORDER — HYDROXYZINE HYDROCHLORIDE 10 MG/1
10 TABLET, FILM COATED ORAL
Qty: 30 TABLET | Refills: 0 | Status: ON HOLD
Start: 2023-03-14

## 2023-03-14 RX ORDER — MIDODRINE HYDROCHLORIDE 5 MG/1
5 TABLET ORAL
Refills: 0 | Status: ON HOLD
Start: 2023-03-14

## 2023-03-14 RX ORDER — CALCIUM CARBONATE 200(500)MG
500 TABLET,CHEWABLE ORAL 3 TIMES DAILY PRN
Refills: 0 | Status: ON HOLD
Start: 2023-03-14

## 2023-03-14 RX ORDER — INSULIN LISPRO 100 [IU]/ML
1-6 INJECTION, SOLUTION INTRAVENOUS; SUBCUTANEOUS
Refills: 0 | Status: ON HOLD
Start: 2023-03-14

## 2023-03-14 RX ORDER — ONDANSETRON 2 MG/ML
4 INJECTION INTRAMUSCULAR; INTRAVENOUS EVERY 4 HOURS PRN
Refills: 0 | Status: ON HOLD
Start: 2023-03-14

## 2023-03-14 RX ADMIN — PANTOPRAZOLE SODIUM 40 MG: 40 TABLET, DELAYED RELEASE ORAL at 06:07

## 2023-03-14 RX ADMIN — OXYCODONE HYDROCHLORIDE 5 MG: 5 TABLET ORAL at 00:14

## 2023-03-14 RX ADMIN — SODIUM CHLORIDE 75 ML/HR: 0.9 INJECTION, SOLUTION INTRAVENOUS at 09:20

## 2023-03-14 RX ADMIN — COLLAGENASE SANTYL: 250 OINTMENT TOPICAL at 11:29

## 2023-03-14 RX ADMIN — MAGNESIUM SULFATE HEPTAHYDRATE 4 G: 40 INJECTION, SOLUTION INTRAVENOUS at 12:22

## 2023-03-14 RX ADMIN — MIDODRINE HYDROCHLORIDE 5 MG: 5 TABLET ORAL at 06:07

## 2023-03-14 RX ADMIN — OXYCODONE HYDROCHLORIDE 5 MG: 5 TABLET ORAL at 05:56

## 2023-03-14 NOTE — PLAN OF CARE
Problem: PAIN - ADULT  Goal: Verbalizes/displays adequate comfort level or baseline comfort level  Description: Interventions:  - Encourage patient to monitor pain and request assistance  - Assess pain using appropriate pain scale  - Administer analgesics based on type and severity of pain and evaluate response  - Implement non-pharmacological measures as appropriate and evaluate response  - Consider cultural and social influences on pain and pain management  - Notify physician/advanced practitioner if interventions unsuccessful or patient reports new pain  3/14/2023 1138 by Oralia Huizar RN  Outcome: Completed  3/14/2023 1137 by Oralia Huizar RN  Outcome: Adequate for Discharge     Problem: INFECTION - ADULT  Goal: Absence or prevention of progression during hospitalization  Description: INTERVENTIONS:  - Assess and monitor for signs and symptoms of infection  - Monitor lab/diagnostic results  - Monitor all insertion sites, i e  indwelling lines, tubes, and drains  - Monitor endotracheal if appropriate and nasal secretions for changes in amount and color  - [place] appropriate cooling/warming therapies per order  - Administer medications as ordered  - Instruct and encourage patient and family to use good hand hygiene technique  - Identify and instruct in appropriate isolation precautions for identified infection/condition  3/14/2023 1138 by Oralia Huizar RN  Outcome: Completed  3/14/2023 1137 by Oralia Huizar RN  Outcome: Adequate for Discharge  Goal: Absence of fever/infection during neutropenic period  Description: INTERVENTIONS:  - Monitor WBC    3/14/2023 1138 by Oralia Huizar RN  Outcome: Completed  3/14/2023 1137 by Oralia Huizar RN  Outcome: Adequate for Discharge     Problem: SAFETY ADULT  Goal: Patient will remain free of falls  Description: INTERVENTIONS:  - Educate patient/family on patient safety including physical limitations  - Instruct patient to call for assistance with activity   - Consult OT/PT to assist with strengthening/mobility   - Keep Call bell within reach  - Keep bed low and locked with side rails adjusted as appropriate  - Keep care items and personal belongings within reach  - Initiate and maintain comfort rounds  - Make Fall Risk Sign visible to staff  - Offer Toileting rs, in advance of need  - Initiate/Maint  - Obtain necessary   - Apply yellow socks and bracelet for high fall risk patients  - Consider moving patient to room near nurses station  3/14/2023 1138 by Tory Krishnamurthy RN  Outcome: Completed  3/14/2023 1137 by Tory Krishnamurthy RN  Outcome: Adequate for Discharge  Goal: Maintain or return to baseline ADL function  Description: INTERVENTIONS:  -  Assess patient's ability to carry out ADLs; assess patient's baseline for ADL function and identify physical deficits which impact ability to perform ADLs (bathing, care of mouth/teeth, toileting, grooming, dressing, etc )  - Assess/evaluate cause of self-care deficits   - Assess range of motion  - Assess patient's mobility; develop plan if impaired  - Assess patient's need for assistive devices and provide as appropriate  - Encourage maximum independence but intervene and supervise when necessary  - Involve family in performance of ADLs  - Assess for home care needs following discharge   - Consider OT consult to assist with ADL evaluation and planning for discharge  - Provide patient education as appropriate  3/14/2023 1138 by Tory Krishnamurthy RN  Outcome: Completed  3/14/2023 1137 by Tory Krishnamurthy RN  Outcome: Adequate for Discharge  Goal: Maintains/Returns to pre admission functional level  Description: INTERVENTIONS:  - Perform BMAT or MOVE assessment daily    - Set and communicate daily mobility goal to care team and patient/family/caregiver  - Collaborate with rehabilitation services on mobility goals if consulted  - Perform Range of Motiones a day  - Reposition pats    - Dangle p  - Stand jj  - Ambulate pat  - Out of bed to juni  - Out of bed for me  - Out of bed for toileting  - Record patient progress and toleration of activity level   3/14/2023 1138 by Clarissa Billingsley RN  Outcome: Completed  3/14/2023 1137 by Clarissa Billingsley RN  Outcome: Adequate for Discharge     Problem: DISCHARGE PLANNING  Goal: Discharge to home or other facility with appropriate resources  Description: INTERVENTIONS:  - Identify barriers to discharge w/patient and caregiver  - Arrange for needed discharge resources and transportation as appropriate  - Identify discharge learning needs (meds, wound care, etc )  - Arrange for interpretive services to assist at discharge as needed  - Refer to Case Management Department for coordinating discharge planning if the patient needs post-hospital services based on physician/advanced practitioner order or complex needs related to functional status, cognitive ability, or social support system  3/14/2023 1138 by Clarissa Billingsley RN  Outcome: Completed  3/14/2023 1137 by Clarissa Billingsley RN  Outcome: Adequate for Discharge     Problem: Prexisting or High Potential for Compromised Skin Integrity  Goal: Skin integrity is maintained or improved  Description: INTERVENTIONS:  - Identify patients at risk for skin breakdown  - Assess and monitor skin integrity  - Assess and monitor nutrition and hydration status  - Monitor labs   - Assess for incontinence   - Turn and reposition patient  - Assist with mobility/ambulation  - Relieve pressure over bony prominences  - Avoid friction and shearing  - Provide appropriate hygiene as needed including keeping skin clean and dry  - Evaluate need for skin moisturizer/barrier cream  - Collaborate with interdisciplinary team   - Patient/family teaching  - Consider wound care consult   3/14/2023 1138 by Clarissa Billingsley RN  Outcome: Completed  3/14/2023 1137 by Clarissa Billingsley RN  Outcome: Adequate for Discharge     Problem: Nutrition/Hydration-ADULT  Goal: Nutrient/Hydration intake appropriate for improving, restoring or maintaining nutritional needs  Description: Monitor and assess patient's nutrition/hydration status for malnutrition  Collaborate with interdisciplinary team and initiate plan and interventions as ordered  Monitor patient's weight and dietary intake as ordered or per policy  Utilize nutrition screening tool and intervene as necessary  Determine patient's food preferences and provide high-protein, high-caloric foods as appropriate       INTERVENTIONS:  - Monitor oral intake, urinary output, labs, and treatment plans  - Assess nutrition and hydration status and recommend course of action  - Evaluate amount of meals eaten  - Assist patient with eating if necessary   - Allow adequate time for meals  - Recommend/ encourage appropriate diets, oral nutritional supplements, and vitamin/mineral supplements  - Order, calculate, and assess calorie counts as needed  - Recommend, monitor, and adjust tube feedings and TPN/PPN based on assessed needs  - Assess need for intravenous fluids  - Provide specific nutrition/hydration education as appropriate  - Include patient/family/caregiver in decisions related to nutrition  3/14/2023 1138 by Leanna Salazar RN  Outcome: Completed  3/14/2023 1137 by Leanna Salazar RN  Outcome: Adequate for Discharge     Problem: MOBILITY - ADULT  Goal: Maintain or return to baseline ADL function  Description: INTERVENTIONS:  -  Assess patient's ability to carry out ADLs; assess patient's baseline for ADL function and identify physical deficits which impact ability to perform ADLs (bathing, care of mouth/teeth, toileting, grooming, dressing, etc )  - Assess/evaluate cause of self-care deficits   - Assess range of motion  - Assess patient's mobility; develop plan if impaired  - Assess patient's need for assistive devices and provide as appropriate  - Encourage maximum independence but intervene and supervise when necessary  - Involve family in performance of ADLs  - Assess for home care needs following discharge   - Consider OT consult to assist with ADL evaluation and planning for discharge  - Provide patient education as appropriate  3/14/2023 1138 by Clarissa Billingsley RN  Outcome: Completed  3/14/2023 1137 by Clarissa Billingsley RN  Outcome: Adequate for Discharge  Goal: Maintains/Returns to pre admission functional level  Description: INTERVENTIONS:  - Perform BMAT or MOVE assessment daily    - Set and communicate daily mobility goal to care team and patient/family/caregiver  - Collaboravices on mobility goals if consulted  - Perform Range of es a day    - Reposition patient ev  - Stand patie  - Ambulate   - Out of bed to john  - Out of bed for me  - Out of bed for toileting  - Record patient progress and toleration of activity level   3/14/2023 1138 by Clarissa Billingsley RN  Outcome: Completed  3/14/2023 1137 by Clarissa Billingsley RN  Outcome: Adequate for Discharge

## 2023-03-14 NOTE — CASE MANAGEMENT
Cm made aware pt is transferring to acute care hospital  Cm to fax dc summary when written to [facility] notifying them of current plan  Christian mssg'd [facility] pts current plan

## 2023-03-14 NOTE — PROGRESS NOTES
Progress Note -Interventional Radiology ANJLEICA Mart 61 y o  male MRN: [id number]  Unit/Bed#: -87 Encounter: [id number]    Assessment:    61year old male with complicated medical history, currently with capped cholecystostomy tube, with recurrent left hepatic lobe abscess  New IR drain placement 3/13/23  Output since placement: 80cc purulent serosanguinous    Plan:    - pain management  Discussed with patient current drain may be irritating diaphragm and causing discomfort  - monitor output  - flush drain daily with 10cc NS  - keep cholecystostomy tube capped at this time  If patient were to have RUQ pain, change in labs, would need to uncap tube and place to drainage bag  Will plan for cholecystostomy tube check in approximately 1 week to evaluate if may be removed  - will order routine 2 week tube check for new drain    Patient Active Problem List   Diagnosis   • Type 2 diabetes mellitus without complication, with long-term current use of insulin (HCC)   • Benign essential hypertension   • Mixed hyperlipidemia   • Erectile dysfunction   • Low testosterone   • Obesity (BMI 30-39  9)   • Testicular hypogonadism   • Incarcerated umbilical hernia   • Left ureteral calculus   • Type 2 diabetes mellitus with hyperlipidemia (HCC)   • Anemia   • Hypokalemia   • Transaminitis   • Thrombocytosis   • Iron deficiency anemia, unspecified   • Malignant neoplasm of transverse colon (HCC)   • 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)   • Colon cancer metastasized to liver [facility])   • Colostomy prolapse (HCC)   • Other fatigue   • Cervical radiculopathy   • Encephalopathy   • MR (mitral regurgitation)   • ESBL (extended spectrum beta-lactamase) producing bacteria infection   • Severe protein-calorie malnutrition (HCC)   • Acute on chronic kidney failure (HCC)   • Sepsis (HCC)   • Abscess   • Acute pain   • Leukocytosis   • Dehiscence of incision   • Elevated alkaline phosphatase level   • Abnormal CT scan   • Hyponatremia   • Nephrolithiasis   • Abdominal wound dehiscence   • Cholecystitis, unspecified   • Depression   • Chronic bilateral pleural effusions          Subjective: Patient with pain to bilateral chest  States pain started after new tube was placed and does not believe it is related to his gallbladder  WBC slightly improved 17 from 19  Objective:    Vitals:  /77 (BP Location: Right arm)   Pulse (!) 109   Temp 98 1 °F (36 7 °C) (Oral)   Resp 17   Ht 5' 9" (1 753 m)   Wt 80 1 kg (176 lb 9 4 oz)   SpO2 95%   BMI 26 08 kg/m²   Body mass index is 26 08 kg/m²  Weight (last 2 days)     None          I/Os:    Intake/Output Summary (Last 24 hours) at 3/14/2023 1047  Last data filed at 3/14/2023 0700  Gross per 24 hour   Intake 120 ml   Output 785 ml   Net -665 ml       Invasive Devices     Central Venous Catheter Line  Duration           Port A Cath 03/10/22 Right Chest 368 days          Drain  Duration           Ileostomy  days    Cholecystostomy Tube 18 days    Abscess Drain Abdomen <1 day                Physical Exam:  General appearance: fatigued and ill appearing  Abdomen: soft, tender to palpation near new drain, non tender around cholecystostomy tube   purulent output from drain  Skin: dressings c/d/i around drains                Lab Results and Cultures:   CBC with diff:   Lab Results   Component Value Date    WBC 17 28 (H) 03/14/2023    HGB 8 6 (L) 03/14/2023    HCT 27 2 (L) 03/14/2023    MCV 83 03/14/2023     03/14/2023    MCH 26 2 (L) 03/14/2023    MCHC 31 6 03/14/2023    RDW 17 4 (H) 03/14/2023    MPV 9 5 03/14/2023    NRBC 0 03/14/2023      BMP/CMP:  Lab Results   Component Value Date     05/02/2017    K 4 2 03/14/2023    K 4 1 09/26/2022     03/14/2023     09/26/2022    CO2 21 03/14/2023    CO2 27 11/16/2022    CO2 21 09/26/2022    BUN 19 03/14/2023    BUN 21 09/26/2022    CREATININE 1 39 (H) 03/14/2023    CREATININE 1 18 05/02/2017    GLUCOSE 151 (H) 11/16/2022    GLUCOSE 118 (H) 05/02/2017    CALCIUM 7 9 (L) 03/14/2023    CALCIUM 9 5 05/02/2017    AST 70 (H) 03/11/2023    AST 22 09/26/2022    ALT 13 03/11/2023    ALT 21 09/26/2022    ALKPHOS 947 (H) 03/11/2023    ALKPHOS 94 04/10/2017    PROT 6 2 04/10/2017    BILITOT 0 6 04/10/2017    EGFR 55 03/14/2023   ,     Coags:   Lab Results   Component Value Date    PTT 37 11/09/2022    INR 1 43 (H) 03/11/2023   ,   Results from last 7 days   Lab Units 03/11/23  1340   INR  1 43*        Lipid Panel:   Lab Results   Component Value Date    CHOL 91 (L) 04/10/2017     Lab Results   Component Value Date    HDL 51 08/19/2022     Lab Results   Component Value Date    HDL 51 08/19/2022     Lab Results   Component Value Date    LDLCALC 77 08/19/2022     Lab Results   Component Value Date    TRIG 157 (H) 08/19/2022       HgbA1c:   Lab Results   Component Value Date    HGBA1C 8 0 (H) 09/26/2022    HGBA1C 8 4 (H) 08/19/2022    HGBA1C 8 7 (H) 05/27/2022       Blood Culture:   Lab Results   Component Value Date    BLOODCX No Growth at 24 hrs  03/12/2023    BLOODCX No Growth at 24 hrs  03/12/2023   ,   Urinalysis:   Lab Results   Component Value Date    COLORU Light Yellow 02/07/2023    COLORU Yellow 04/10/2017    CLARITYU Clear 02/07/2023    SPECGRAV 1 012 02/07/2023    SPECGRAV 1 028 04/10/2017    PHUR 5 0 02/07/2023    PHUR 5 5 04/10/2017    LEUKOCYTESUR Negative 02/07/2023    LEUKOCYTESUR Negative 04/10/2017    NITRITE Negative 02/07/2023    NITRITE Negative 04/10/2017    PROTEINUA Trace 04/10/2017    GLUCOSEU Negative 02/07/2023    KETONESU Negative 02/07/2023    KETONESU Negative 04/10/2017    BILIRUBINUR Negative 02/07/2023    BILIRUBINUR Negative 04/10/2017    BLOODU Negative 02/07/2023   ,   Urine Culture:   Lab Results   Component Value Date    URINECX No Growth <1000 cfu/mL 08/23/2016   ,   Wound Culure:    Lab Results   Component Value Date    WOUNDCULT 1+ Growth of Escherichia coli (A) 03/12/2023       Thank you for allowing me to participate in the care of [address] [place] [address]  Please don't hesitate to call, text, email, or TigerText with any questions  This text is generated with voice recognition software  There may be translation, syntax,  or grammatical errors  If you have any questions, please contact the dictating provider      Mikel Wesley PA-C

## 2023-03-14 NOTE — PROGRESS NOTES
03/14/23 0830   Pain Assessment   Pain Assessment Tool 0-10   Pain Score 6   Pain Location/Orientation Location: Abdomen   Restrictions/Precautions   Precautions Fall Risk;Fluid restriction;Contact/isolation;Pain  (ilieostomy, new LUQ drain and cholecystostomy drain)   Weight Bearing Restrictions No   ROM Restrictions No   Subjective   Subjective Pt sleeping upon PTs arrival in room, PT awoke pt but pt still very fatigued  Pt agreeable to do "a little" of bed level TE   Lying to Sitting on Side of Bed   Reason if not Attempted Refused to perform   Lying to Sitting on Side of Bed CARE Score 7   Sit to Stand   Reason if not Attempted Refused to perform   Sit to Stand CARE Score 7   Walk 10 Feet   Reason if not Attempted Safety concerns   Walk 10 Feet CARE Score 88   Walk 50 Feet with Two Turns   Reason if not Attempted Safety concerns   Walk 50 Feet with Two Turns CARE Score 88   Walk 150 Feet   Reason if not Attempted Safety concerns   Walk 150 Feet CARE Score 88   Therapeutic Interventions   Strengthening supine b/l SAQs with AROM/AAROM needed at times to complete as well as max cueing to have pt cont with task as pt severly fatigued, falling asleep between reps  Did complete 3x10 b/l, however, due to extreme fatigue PT ceased exercises as not appropriate to cont with TE due to extreme fatigue  Did require approx 10' to complete SAQs due to very slow movements of b/l LEs and fatigue   Flexibility supine b/l passive gastroc stretch and ankle ROM 8' total   Assessment   Treatment Assessment Pt seen for brief PT session focusing on bed level TE due to extreme fatigue  Vitals taken start of session in supine, /83, , SPO2 94%  Pt falling asleep during PT tx despite continuous cues to cont with task  Due to extreme fatigue, elected to end PT session as pt not appropriate to be seen at this time  Attempted to help pt reposition self for comfort but pt declining   Will cont with POC when pt more alert and participatory  PT Therapy Minutes   PT Time In 0830   PT Time Out 0853   PT Total Time (minutes) 23   PT Mode of treatment - Individual (minutes) 23   PT Mode of treatment - Concurrent (minutes) 0   PT Mode of treatment - Group (minutes) 0   PT Mode of treatment - Co-treat (minutes) 0   PT Mode of Treatment - Total time(minutes) 23 minutes   PT Cumulative Minutes 1348   Therapy Time missed   Time missed?  Yes   Amount of time missed 37   Reason for time missed Extreme fatigue

## 2023-03-14 NOTE — PROGRESS NOTES
Progress note - Palliative and Supportive Care   Girma Herrera 61 y o  male [id number]    Patient Active Problem List   Diagnosis   • Type 2 diabetes mellitus without complication, with long-term current use of insulin (HCC)   • Benign essential hypertension   • Mixed hyperlipidemia   • Erectile dysfunction   • Low testosterone   • Obesity (BMI 30-39  9)   • Testicular hypogonadism   • Incarcerated umbilical hernia   • Left ureteral calculus   • Type 2 diabetes mellitus with hyperlipidemia (HCC)   • Anemia   • Hypokalemia   • Transaminitis   • Thrombocytosis   • Iron deficiency anemia, unspecified   • Malignant neoplasm of transverse colon (HCC)   • 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)   • Colostomy prolapse (HCC)   • Other fatigue   • Cervical radiculopathy   • Encephalopathy   • MR (mitral regurgitation)   • ESBL (extended spectrum beta-lactamase) producing bacteria infection   • Severe protein-calorie malnutrition (HCC)   • Acute on chronic kidney failure (HCC)   • Sepsis (HCC)   • Abscess   • Acute pain   • Leukocytosis   • Dehiscence of incision   • Elevated alkaline phosphatase level   • Abnormal CT scan   • Hyponatremia   • Nephrolithiasis   • Abdominal wound dehiscence   • Cholecystitis, unspecified   • Depression   • Chronic bilateral pleural effusions     Active issues specifically addressed today include:   • Palliative care encounter  • Intra-abdominal abscess  • Cancer associated pain  • Metastatic colon cancer    Plan:  1  Symptom management -   • Continue cymbalta 30mg QD  • Atarax 10mg HS  • Melatonin 6mg HS  • Oxycodone 2 5-5mg PO Q4H PRN moderate-severe pain  • Robaxin 500mg BID PRN muscle spasms  • Bowel regimen to prevent OIC   -     2  Goals -continue full cares   -    3  Psychosocial support-time spent with patient today providing supportive listening      -    4   [facility] [id number] will continue to follow for support       Code Status: Full code  - Level 1   Decisional apparatus:  Patient is competent on my exam today  If competence is lost, patient's substitute decision maker would default to spouse [place]  Advance Directive / Living Will / POLST: None on file  Interval history:        patient tells me he is having a bad day  He does seem intermittently confused  He notes worsening pain at the site of where they changed the drain yesterday  His spouse is at bedside and states he seems a little "off" today  Patient will be transferred back to the hospital today    MEDICATIONS / ALLERGIES:     all current active meds have been reviewed and current meds:   Current Facility-Administered Medications   Medication Dose Route Frequency   • acetaminophen (TYLENOL) tablet 650 mg  650 mg Oral Q4H PRN   • alteplase (CATHFLO) injection 2 mg  2 mg Intracatheter Once   • aspirin chewable tablet 81 mg  81 mg Oral Daily   • atorvastatin (LIPITOR) tablet 40 mg  40 mg Oral Daily   • calcium carbonate (TUMS) chewable tablet 500 mg  500 mg Oral TID PRN   • collagenase (SANTYL) ointment   Topical Daily   • DULoxetine (CYMBALTA) delayed release capsule 30 mg  30 mg Oral Daily   • ertapenem (INVanz) 1,000 mg in sodium chloride 0 9 % 50 mL IVPB  1,000 mg Intravenous Q24H   • hydrOXYzine HCL (ATARAX) tablet 10 mg  10 mg Oral HS   • insulin lispro (HumaLOG) 100 units/mL subcutaneous injection 1-6 Units  1-6 Units Subcutaneous TID AC   • insulin lispro (HumaLOG) 100 units/mL subcutaneous injection 1-6 Units  1-6 Units Subcutaneous HS   • melatonin tablet 6 mg  6 mg Oral HS   • methocarbamol (ROBAXIN) tablet 500 mg  500 mg Oral BID PRN   • midodrine (PROAMATINE) tablet 5 mg  5 mg Oral TID AC   • ondansetron (ZOFRAN) injection 4 mg  4 mg Intravenous Q4H PRN   • oxyCODONE (ROXICODONE) IR tablet 2 5 mg  2 5 mg Oral Q4H PRN   • oxyCODONE (ROXICODONE) IR tablet 5 mg  5 mg Oral Q4H PRN   • pantoprazole (PROTONIX) EC tablet 40 mg  40 mg Oral BID AC   • simethicone [facility]) chewable tablet 80 mg  80 mg Oral 4x Daily (with meals and at bedtime)   • sodium bicarbonate tablet 1,300 mg  1,300 mg Oral TID after meals   • sodium chloride 0 9 % infusion  75 mL/hr Intravenous Continuous   • tamsulosin (FLOMAX) capsule 0 4 mg  0 4 mg Oral Daily With Dinner       Allergies   Allergen Reactions   • Shellfish-Derived Products - Food Allergy Anaphylaxis   • Erythromycin GI Intolerance       OBJECTIVE:    Physical Exam  Physical Exam  Vitals and nursing note reviewed  Constitutional:       General: He is not in acute distress  Appearance: He is ill-appearing  HENT:      Head: Normocephalic and atraumatic  Right Ear: External ear normal       Left Ear: External ear normal       Nose: Nose normal    Eyes:      General: No scleral icterus  Right eye: No discharge  Left eye: No discharge  Cardiovascular:      Rate and Rhythm: Normal rate and regular rhythm  Pulmonary:      Effort: Pulmonary effort is normal  No respiratory distress  Breath sounds: Normal breath sounds  Abdominal:      General: Bowel sounds are normal  There is no distension  Palpations: Abdomen is soft  Skin:     General: Skin is warm and dry  Coloration: Skin is pale  Neurological:      Mental Status: He is alert and oriented to person, place, and time  Lab Results:   I have personally reviewed pertinent labs  , CBC:   Lab Results   Component Value Date    WBC 17 28 (H) 03/14/2023    HGB 8 6 (L) 03/14/2023    HCT 27 2 (L) 03/14/2023    MCV 83 03/14/2023     03/14/2023    MCH 26 2 (L) 03/14/2023    MCHC 31 6 03/14/2023    RDW 17 4 (H) 03/14/2023    MPV 9 5 03/14/2023    NRBC 0 03/14/2023   , CMP:   Lab Results   Component Value Date    SODIUM 133 (L) 03/14/2023    K 4 2 03/14/2023     03/14/2023    CO2 21 03/14/2023    BUN 19 03/14/2023    CREATININE 1 39 (H) 03/14/2023    CALCIUM 7 9 (L) 03/14/2023    EGFR 55 03/14/2023   , PT/PTT:No results found for: PT, PTT  Imaging Studies: Reviewed  EKG, Pathology, and Other Studies: Reviewed    Counseling / Coordination of Care    Total floor / unit time spent today 25+ minutes  Greater than 50% of total time was spent with the patient and / or family counseling and / or coordination of care  A description of the counseling / coordination of care: Chart review, medication review, prolonged time spent providing supportive listening    Portions of this document may have been created using dictation software and as such some "sound alike" terms may have been generated by the system  Do not hesitate to contact me with any questions or clarifications

## 2023-03-14 NOTE — PROGRESS NOTES
Pt lethargic today no complaints unable to participate in therapies pt alert and oriented Pt did not eat breakfast or lunch and too lethargic to swallow meds Does sip on water and will respond to questions   Pt voice is a mackenzie Cabrera NP and Dr Zainab Barajas aware pt did not eat or take oral meds today Pt transferred to acute side Wife aware and was at bedside pt wife went home and took pt belongings clothes pt has glasses and cell phone report called to nurse on P9

## 2023-03-14 NOTE — PLAN OF CARE
Problem: PAIN - ADULT  Goal: Verbalizes/displays adequate comfort level or baseline comfort level  Description: Interventions:  - Encourage patient to monitor pain and request assistance  - Assess pain using appropriate pain scale  - Administer analgesics based on type and severity of pain and evaluate response  - Implement non-pharmacological measures as appropriate and evaluate response  - Consider cultural and social influences on pain and pain management  - Notify physician/advanced practitioner if interventions unsuccessful or patient reports new pain  Outcome: Adequate for Discharge     Problem: INFECTION - ADULT  Goal: Absence or prevention of progression during hospitalization  Description: INTERVENTIONS:  - Assess and monitor for signs and symptoms of infection  - Monitor lab/diagnostic results  - Monitor all insertion sites, i e  indwelling lines, tubes, and drains  - Monitor endotracheal if appropriate and nasal secretions for changes in amount and color  - [place] appropriate cooling/warming therapies per order  - Administer medications as ordered  - Instruct and encourage patient and family to use good hand hygiene technique  - Identify and instruct in appropriate isolation precautions for identified infection/condition  Outcome: Adequate for Discharge  Goal: Absence of fever/infection during neutropenic period  Description: INTERVENTIONS:  - Monitor WBC    Outcome: Adequate for Discharge     Problem: SAFETY ADULT  Goal: Patient will remain free of falls  Description: INTERVENTIONS:  - Educate patient/family on patient safety including physical limitations  - Instruct patient to call for assistance with activity   - Consult OT/PT to assist with strengthening/mobility   - Keep Call bell within reach  - Keep bed low and locked with side rails adjusted as appropriate  - Keep care items and personal belongings within reach  - Initiate and maintain comfort rounds  - Make Fall Risk Sign visible to staff  - Offer Toiletiours, in advance of need  - Inirm  - Obtain necessa  - Apply yellow socks and bracelet for high fall risk patients  - Consider moving patient to room near nurses station  Outcome: Adequate for Discharge  Goal: Maintain or return to baseline ADL function  Description: INTERVENTIONS:  -  Assess patient's ability to carry out ADLs; assess patient's baseline for ADL function and identify physical deficits which impact ability to perform ADLs (bathing, care of mouth/teeth, toileting, grooming, dressing, etc )  - Assess/evaluate cause of self-care deficits   - Assess range of motion  - Assess patient's mobility; develop plan if impaired  - Assess patient's need for assistive devices and provide as appropriate  - Encourage maximum independence but intervene and supervise when necessary  - Involve family in performance of ADLs  - Assess for home care needs following discharge   - Consider OT consult to assist with ADL evaluation and planning for discharge  - Provide patient education as appropriate  Outcome: Adequate for Discharge  Goal: Maintains/Returns to pre admission functional level  Description: INTERVENTIONS:  - Perform BMAT or MOVE assessment daily    - Set and communicate daily mobility goal to care team and patient/family/caregiver  - Collaborate with rehabilitation services on mobility goals if consulted  - Perform Range of es a day  - Repositiurs    - Dangle memo  - Stand pat  - Ambulate patiy  - Out of bed to  - Out of bed for   - Out of bed for toileting  - Record patient progress and toleration of activity level   Outcome: Adequate for Discharge     Problem: DISCHARGE PLANNING  Goal: Discharge to home or other facility with appropriate resources  Description: INTERVENTIONS:  - Identify barriers to discharge w/patient and caregiver  - Arrange for needed discharge resources and transportation as appropriate  - Identify discharge learning needs (meds, wound care, etc )  - Arrange for interpretive services to assist at discharge as needed  - Refer to Case Management Department for coordinating discharge planning if the patient needs post-hospital services based on physician/advanced practitioner order or complex needs related to functional status, cognitive ability, or social support system  Outcome: Adequate for Discharge     Problem: Prexisting or High Potential for Compromised Skin Integrity  Goal: Skin integrity is maintained or improved  Description: INTERVENTIONS:  - Identify patients at risk for skin breakdown  - Assess and monitor skin integrity  - Assess and monitor nutrition and hydration status  - Monitor labs   - Assess for incontinence   - Turn and reposition patient  - Assist with mobility/ambulation  - Relieve pressure over bony prominences  - Avoid friction and shearing  - Provide appropriate hygiene as needed including keeping skin clean and dry  - Evaluate need for skin moisturizer/barrier cream  - Collaborate with interdisciplinary team   - Patient/family teaching  - Consider wound care consult   Outcome: Adequate for Discharge     Problem: Nutrition/Hydration-ADULT  Goal: Nutrient/Hydration intake appropriate for improving, restoring or maintaining nutritional needs  Description: Monitor and assess patient's nutrition/hydration status for malnutrition  Collaborate with interdisciplinary team and initiate plan and interventions as ordered  Monitor patient's weight and dietary intake as ordered or per policy  Utilize nutrition screening tool and intervene as necessary  Determine patient's food preferences and provide high-protein, high-caloric foods as appropriate       INTERVENTIONS:  - Monitor oral intake, urinary output, labs, and treatment plans  - Assess nutrition and hydration status and recommend course of action  - Evaluate amount of meals eaten  - Assist patient with eating if necessary   - Allow adequate time for meals  - Recommend/ encourage appropriate diets, oral nutritional supplements, and vitamin/mineral supplements  - Order, calculate, and assess calorie counts as needed  - Recommend, monitor, and adjust tube feedings and TPN/PPN based on assessed needs  - Assess need for intravenous fluids  - Provide specific nutrition/hydration education as appropriate  - Include patient/family/caregiver in decisions related to nutrition  Outcome: Adequate for Discharge     Problem: MOBILITY - ADULT  Goal: Maintain or return to baseline ADL function  Description: INTERVENTIONS:  -  Assess patient's ability to carry out ADLs; assess patient's baseline for ADL function and identify physical deficits which impact ability to perform ADLs (bathing, care of mouth/teeth, toileting, grooming, dressing, etc )  - Assess/evaluate cause of self-care deficits   - Assess range of motion  - Assess patient's mobility; develop plan if impaired  - Assess patient's need for assistive devices and provide as appropriate  - Encourage maximum independence but intervene and supervise when necessary  - Involve family in performance of ADLs  - Assess for home care needs following discharge   - Consider OT consult to assist with ADL evaluation and planning for discharge  - Provide patient education as appropriate  Outcome: Adequate for Discharge  Goal: Maintains/Returns to pre admission functional level  Description: INTERVENTIONS:  - Perform BMAT or MOVE assessment daily    - Set and communicate daily mobility goal to care team and patient/family/caregiver  - Collaborate with rehabilitation services on mobility goals if consulted  - Perform Range of Mimes a day  - Reposition paturs    - Dangle jj  - Stand patien  - Ambulate pat  - Out of b  - Out  - Out of bed for toileting  - Record patient progress and toleration of activity level   Outcome: Adequate for Discharge

## 2023-03-14 NOTE — PROGRESS NOTES
Progress Note - Infectious Disease   Vic Mart 61 y o  male MRN: [id number]  Unit/Bed#: -01 Encounter: [id number]      Impression/Recommendations:  Recurrent intraabdominal abscess  In the setting complex surgical history as below   Initially developed 11/2022 accompanied by ESBL E  coli bacteremia   Status post exploratory laparotomy, right hemicolectomy, temporary abdominal closure 11/16; re-exploration, partial left colectomy, primary ileocolonic anastomosis with proximal diverting loop ileostomy, midline fascial closure on 11/17   Then developed abscess in hepatectomy bed, also collection +/- leak LUQ at area of prior colonic anastamosis   Status post IR drain into perihepatic collection 12/8   Cultures with ESBL E  Coli   Status post 7 days ertapenem   Developed recurrent sepsis and repeat CT 12/17 shows hepatic bed collection improved, persistent left intra-abdominal collection   Status post perigastic, perisplenic drains 12/20   Cultures again with ESBL E  Coli   Status post 21 days total antibiotics, completed 1/7   Left drains x2 removed 1/31   Midline hepatic bed drain removed 2/24   Collections improving off antibiotics  MRCP done for rising LFTs now shows recurrence  Status post drain placement 3/13  Fluid cultures with GNRs  Rec:  • Continue ertapenem for now  • Drain per IR  • Given concern that abscess may be intraparenchymal, may need more prolonged course of antibiotics (hopefully PO)  • Follow temperatures closely  • Check CBC in AM  • Supportive care as per the primary service       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  Vilinda Fire setting of chronic cholecystostomy tube for possible cholecystitis, perihepatic abscess as below   Alvina tube now with drainage consistent with this   Repeat tube check 3/7 shows no evidence of fistula   Tube now capped      Chronic abdominal wound    In setting of prior incisional dehiscence   Status post recent debridement   Wound now appears clean      Metastatic colon cancer     To liver, possible lung   Status post resection, chemotherapy, more recent hepatic resection and ablation, transverse colon resection   Recent rise in bilirubin, chronically elevated alk phos      CKD     Baseline Cr 1 4       DM  The above plan was discussed in detail with the patient and his wife at the bedside      Antibiotics:  Ertapenem #2    Subjective:  Patient seen on AM rounds  Wife at bedside helps provide history  Very tired today  Intermittent pain in rib cage, radiating to shoulders since drain placement  24 Hour Events:  No documented fevers, chills, sweats, nausea, vomiting, or diarrhea  Being transferred back to acute care  Objective:  Vitals:  Temp:  [97 5 °F (36 4 °C)-98 7 °F (37 1 °C)] 98 1 °F (36 7 °C)  HR:  [] 109  Resp:  [16-19] 17  BP: (125-143)/(68-85) 130/77  SpO2:  [91 %-99 %] 95 %  Temp (24hrs), Av °F (36 7 °C), Min:97 5 °F (36 4 °C), Max:98 7 °F (37 1 °C)  Current: Temperature: 98 1 °F (36 7 °C)    Physical Exam:   General:  No acute distress  Psychiatric:  Awake and alert  Pulmonary:  Normal respiratory excursion without accessory muscle use  Abdomen:  New VICTORINO with serosanguinous fluid  Extremities:  No edema  Skin:  No rashes    Lab Results:  I have personally reviewed pertinent labs    Results from last 7 days   Lab Units 23  0555 23  9284 23  0546 23  1052 03/10/23  0647 23  0647   POTASSIUM mmol/L 4 2 4 0 4 2 4 1 4 2 4 1   CHLORIDE mmol/L 105 104 103 103 102 105   CO2 mmol/L 21 22 21 20* 18* 18*   BUN mg/dL 19 20 20 20 20 19   CREATININE mg/dL 1 39* 1 47* 1 45* 1 53* 1 38* 1 36*   EGFR ml/min/1 73sq m 55 51 52 49 55 56   CALCIUM mg/dL 7 9* 8 0* 8 1* 8 0* 8 2* 8 3   AST U/L  --   --   --  70* 86* 87*   ALT U/L  --   --   --  13 15 16   ALK PHOS U/L  --   --   --  947* 984* 972*     Results from last 7 days   Lab Units 23  0737 23  0608 23  0546   WBC Thousand/uL 17 28* 19 28* 20 27*   HEMOGLOBIN g/dL 8 6* 7 9* 8 2*   PLATELETS Thousands/uL 320 325 365     Results from last 7 days   Lab Units 03/13/23  1601 03/13/23  1514 03/13/23  1513 03/12/23  1515 03/12/23  1233 03/08/23  1629   BLOOD CULTURE   --   --   --  No Growth at 24 hrs  No Growth at 24 hrs   --   --    Carlos Boss STAIN RESULT  4+ Polys*  4+ Gram positive rods* 1+ Polys  No organisms seen No Polys or Bacteria seen  --  2+ Disintegrating polys*  Rare Gram negative rods*  --    WOUND CULTURE   --   --   --   --  1+ Growth of Escherichia coli*  --    BODY FLUID CULTURE, STERILE  4+ Growth of Gram Negative Johnie* No growth  --   --   --   --    C DIFF TOXIN B BY PCR   --   --   --   --   --  Negative       Imaging Studies:   I have personally reviewed pertinent imaging study reports and images in PACS  EKG, Pathology, and Other Studies:   I have personally reviewed pertinent reports

## 2023-03-14 NOTE — PROGRESS NOTES
03/14/23 1030   Assessment   Treatment Assessment pt scheduled for 60 minutes of OT however per Dr Dayday Gayle pt will be transferring off the unit back to acute due to change in medical status  Therapy Time missed   Time missed?  Yes   Amount of time missed 60   Reason for time missed Illness  (Dr Dayday Gayle reports pt plan to d/c back to acute due to change in medical status)

## 2023-03-14 NOTE — DISCHARGE SUMMARY
Discharge Summary - PMR   Stephania Lagunas 61 y o  male MRN: [id number]  Unit/Bed#: -53 Encounter: [id number]    Admission Date: 2/24/2023     Discharge Date: 03/14/2023    Etiologic/Rehabilitation Diagnosis: Impairment of mobility, safety and Activities of Daily Living (ADLs) due to Debility:  16  Debility (Non-cardiac/Non-pulmonary)    HPI: Stephania Lagunas is a 61 y o  male with a medical history of hypertension, hyperlipidemia, ventral hernia, GERD that presented to [facility] on 11/7 for an elective surgical procedure due to metastatic colon adenocarcinoma by Dr Hopson Flavin  Patient presented to hem/onc on 2/22 when CT of abdomen revealed transverse colonic apple core lesion and innumerable hepatic metastases  MRI revealed multiple hepatic metastasis with smaller lesions in left lobe with larger lesions on the right lobe  On 11/7, patient underwent exploratory laparotomy, segment 3 liver resection, ablation, intraoperative ultrasound of liver  This was complicated by a left upper quadrant hematoma, imaging showed suspected leak from transverse colon anastomosis  On 11/16 went to OR revealed left upper quadrant infected hematoma, defect in the transverse colon anastomosis with extravasation of succus  Massively dilated cecum requiring resection  He had a right hemicolectomy, left in discontinuity and temporary abdominal closure device was placed  On 11/17, he underwent re-exploration, partial descending colectomy, primary colonic anastomosis created with diverting loop ileostomy and midline wound VAC placement  He completed a prolonged course of IV antibiotics for ESBL bacteremia due to intra-abdominal abscess  On 12/3, patient was discharged to SNF for rehab  Patient was transferred back to hospital on 12/4 due to abdominal pain  He is unable to return to SNF due to concerns for pain management and management of acute medical needs   Per Surgery, patient is to continue with wet-to-dry dressings to midline abdominal incision, and packing to old stoma site  Patient has been observed off of antibiotics, with close monitoring of leukocytosis  On 12/6, patient was with noted increased creatinine level, and was administered 1L IV fluid bolus, with noted improvement  He then developed tachycardia, as well as increased abdominal pain, requiring additional IV fluid bolus  Abdominal incision was with noted yellow drainage  CT chest/abdomen/pelvis showed abdominal abscess and distended gallbladder  ID was consulted, and patient was continued on IV antibiotics (plan for 7 day course through 12/15)  Patient was taken to IR and underwent percutaneous cholecystostomy tube insertion and hepatectomy abscess drainage  Nephrology was consulted re: ALEXY, and initiated patient on Sodium Bicarb gtt as well as IV Albumin to assist with intravascular volume shifts, which as since been discontinued  On 12/14, patient with complaints of midline/left chestwall pain, right below IR abscess drain  Lasted for a few seconds and then resolved - suspected related to drain placement, however cardiac workup completed  Trop level was 57 and CXR showed progressive bilateral opacities suspicious for CHF and small left pleural effusion  Cardiology was consulted, with chest pain appearing musculoskeletal, and no further cardiac workup is warranted  Additionally, no diuresis or further intervention suggested for CXR findings  The patient was evaluated by the Rehabilitation team and deemed an appropriate candidate for comprehensive inpatient rehabilitation and admitted to the [facility] on 12/14/2022  He was discharged home on 1/17/23      He presented to the [address] [place] [address] on 2/7/23 with increasing weakness for the prior 2 weeks in the setting of 2 falls  He was found to be Spetic on admission with the m ain potential source being his midline abdominal wound with fould-smelling drainage   CT showed improvement in the fluid collection at the left liver segment, perisplenic, left retroperitoneum, inferior to the spleen  Patient treated with broad spectrum antibiotics eventually placed on Ertapenem  Course conplicated by spiking intermittent fevers as well as elevated WBC count   Repeat imaging concerning for cholecystitis  This was discussed with both surgery and IR, IR plan for cystostomy tube change   Pattock drain was removed on 2/24, cystostomy drain exchanged on 2/24  The patient was evaluated by the Rehabilitation team and deemed an appropriate candidate for comprehensive inpatient rehabilitation and admitted to the [facility] on 2/24/2023  2:58 PM     On arrival he endorses some discomfort around the perc chol site as it was just exchanged, otherwise he endorses improved appetite and limited nausea since his prior admission, less leaking of the ostomy and overall improved function, but still not at his baseline  Hgb remains low, type and screen and CBC ordered for the morning      Procedures Performed During ARC Admission: None    Acute Rehabilitation Center Course: Patient required transfer to acute hospital      Malignant neoplasm of transverse colon [facility])  Assessment & Plan  Complex history with chronological details below:    2/22: S/P diverting colostomy, liver biopsy +Chemo tx  11/7/22: Hepatic resection and colostomy reversal  11/16/22: Exploratory laparotomy with bowel resection and VAC placement   11/17/22: Right hemicolectomy, partial descending colon resection, colocolonic anastomosis with loop ileosotmy and midline VAC placement  12/20/22: Drains placed, total of 3 drains + perc radha tube + ileostomy  1/31/23: 2 of 3 drains removed  This hospital stay had fever/leukocytosis/sepsis, started Meropenem and then to Vancomycin and Ertapenem  Wound cx again = Ecoli ESBL   LD Ertapenem 2/14, Vancomycin 2/16 2/24: Abscessogram of midline drain showed minimal persistent collection and the midline catheter was removed  Perc Alvina tube exchanged  03/06- IR cholecystostomy tube check, capped  • Patient does not wish to follow with [place] oncology anymore as it is too far of a drive; would like to establish with [facility] oncology (already follows with Dr Wilfrid Siemens)  • Follows with Dr Geovanna Rosen as outpt      * Sepsis [facility])  Assessment & Plan  Noted the development of foul-smelling drainage and increasing pain from his chronic abdominal wound with leukocytosis, tachypnea, tachycardia  • Purulent drainage from abdominal wound  • Hx of colon CA w/ extensive abd surgical- adenocarcinoma of transverse colon section  • Patient has several abdominal drains in place, with CT showing improvement in the fluid collections at the left liver lobe, perisplenic area, and the left retroperitoneum inferior to the spleen  • Completed Vancomycin and Ertapenem  • S/p bedside midline wound debridement + wet dressing 2/8 with general surgery  • S/p IR tube check 2/17/2023, drains kept in place  Discussed with IR, follow up in 1 month  • Given ongoing low grade fever and worsening leukocytosis, ordered repeat blood cultures and repeat CT a/p    CT a/p showing moderate R pleural effusion, imaging concerning for cholecystitis  Discussed case with surgery, IR  IR will reposition drain today- Alvina tube check showed retraction of the alvina tube   Interval occlusion of the cystic duct   Dino-enteric fistula persisted  Exchange of alvina tube 2/24  • Continue to monitor off of antibiotics, low threshold to re-consult ID, but stable at this time  • Debility from Sepsis in addition to 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  • Pain control, anxiety management  • PT/OT  • 03/06 WBC 15 85, Hbg 7 7, Na+ 128, creatinine 1 66, total bilirubin, AST/ALT/Alk Phos elevated    • Consulted GI, deferred to IR for tube check  • IVF NS 1L  - Cholecystostomy drain check findings: diagnostic cholangiogram demonstrating filling of cystic and common bile duct, minimal pain, no leaking, slight adjustment of tube further into gallbladder body  Tube capped, can consider removal if patient passes capping trial in 2 weeks and if surgery is in agreement  - c/s Surgical Oncology, Dr Jay Aiken in agreement with IR recommendations  - GI consulted, recommending MRI/MRCP r/o obstruction 03/09, trend LFT  -nephrology consulted- sodium bicarb 1300 mg BID, d/c sodium tabs, midodrine 5 mg  - MRCP- showed increased 5 1 cm abscess cavity; plan for IR 3/13 for abscess drain and right thoracentesis  - ID started Ertapenem 03/12      Chronic bilateral pleural effusions  Assessment & Plan  Hx of pleural effusions with   Interval worsening of moderate right and small left pleural effusions  There is associated bibasilar atelectasis based on CTAP on 2/22  Monitor oxygen and breathing, may benefit from thoracentesis  - large pleural effusion- 3/13 right thoracentesis, 1 6 L removed    Depression  Assessment & Plan  Continue Cymbalta  Provide supportive counseling and encouragement, easily tearful  Neuropsychology consultation for support    Cholecystitis, unspecified  Assessment & Plan  Subacute at this point, but still requiring per radha tube  • s/p percutaneous tube placement 12/8/22 asnd recent exchange on 2/24/23  • CT scan was concerning for cholecystitis --> to IR 2/24 = showed retraction of the radha tube and interval occlusion of the cystic duct   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  Radha tube was exchanged  • Monitor output and pain  • 3/7 cholecystostomy drain check- advanced slightly and capped; consider removal in 2 wks if tolerates capping    • Dr Jay Aiken in agreement    Abdominal wound dehiscence  Assessment & Plan  Midline abdominal wound with initially foul smelling drainage  Now improved per patient with red granular base and slough noted  Santyl and wet to dry dressing  Consult wound care   Daily dressing changes  Healing    Nephrolithiasis  Assessment & Plan  RUQ US revealed 7 mm nonobstructing right intrarenal calculus  No hydronephrosis noted on CT a/p  Asymptomatic at this time  • Flomax  • Monitor outpt, symptoms    Hyponatremia  Assessment & Plan  • Mild, most recently 131   • [place] likely2/2 SIADH in setting of malignancy  • Sodium bicarbonate 1300 mg BID  • Monitor BMP  • FR 1500    Elevated alkaline phosphatase level  Assessment & Plan  Chronically elevated likely in setting of known hepatic metastasis  • RUQ US Decompressed gallbladder around a cholecystostomy tube, limiting evaluation  Hepatomegaly  Heterogeneous echotexture of the liver in this patient with known metastatic disease  7 mm nonobstructing right intrarenal calculus  • Monitor with am CMP  • GI consulted; MRI/MRCP to r/o obstruction, negative for obstruction    Leukocytosis  Assessment & Plan  WBC 17 28  Mildly above normal, trendiong down, however has a history of spiking and dropping  Monitor labs however need to follow clinical exam and vitals as it may spike and drop again  Monitor off antibiotics  ID following  If Temp >101 blood cultures and consider CT A/P  R/o C diff 3/8, negative  - Ertapenem daily started 3/12    Abscess  Assessment & Plan  - Smaller abscesses on recent studies now with no active drains  At risk for further complications  - Lower theshold for abdominal CT if develops fevers or leukocytosis-  - MRCP revealed abscess cavity increasing 5 1cm, plan for IR drain placement 3/13   - LUQ abscess drain with purulent drainage    Acute on chronic kidney failure ([facility] [address] )  Assessment & Plan  • POA with creatinine 2 52; baseline 1-1 3  • Suspecting possible multifactorial cause in setting of dehydration and sepsis  • Improving back into baseline levels, Continue to monitor on labs, minimize any relative hypotension  Avoid nephrotoxic agents    • Creatinine 1 47 (3/13)    Anemia  Assessment & Plan  Hbg 8 6   Required transfusion on 2/16  Order type and screen, 1 unit pRBC (2/26)  - received 1 unit pRBC for symptomatic Hbg 7 1 3/11  Monitor CBC    Mixed hyperlipidemia  Assessment & Plan  Continue statin    Benign essential hypertension  Assessment & Plan  Monitor pressures in therapy    Type 2 diabetes mellitus without complication, with long-term current use of insulin [facility])  Assessment & Plan  Lab Results   Component Value Date    HGBA1C 8 0 (H) 09/26/2022       Recent Labs     02/24/23  0839 02/24/23  1137 02/24/23  1626 02/24/23  2040   POCGLU 106 105 152* 120     • Home:  Lantus 8U qam/Lispro 4U TID  • Here:  Lantus 5U qhs/Lispro 5U TID, SSI  • Has a DEXCOM meter and a regular meter at home  • Continue DM diet and QID Accuchecks/SSI      Discharge Physical Examination:  /77 (BP Location: Right arm)   Pulse (!) 109   Temp 98 1 °F (36 7 °C) (Oral)   Resp 17   Ht 5' 9" (1 753 m)   Wt 80 1 kg (176 lb 9 4 oz)   SpO2 95%   BMI 26 08 kg/m²       Gen: No acute distress, Well-nourished, ill-appearing  HEENT: Moist mucus membranes, Normocephalic/Atraumatic  Cardiovascular: Tachycardic, Regular rate, rhythm, S1/S2  Distal pulses palpable  Heme/Extr: No edema/clubbing/cyanosis  Pulmonary: Non-labored breathing  Lungs CTAB  : No rivero, voiding, continent  GI: Soft, tender, non-distended  BS+, + ileostomy, + cholecystostomy drain, + LUQ abscess drain  MSK: ROM is WFL in all extremities  No effusions or deformities  Bulk is symmetric  Integumentary: Skin is warm, dry  No rashes or ulcers  Midline abdominal wound, not visualized, dressing change already performed  Neuro: AAOx3, sensation intact to light touch throughout  Speech is intact  Appropriate to questioning     Psych: Depressed mood    Significant Findings, Care, Treatment and Services Provided: Acute comprehensive interdisciplinary inpatient rehabilitation including PT, OT, SLP, RN, CM, SW, dietary, psychology, etc     Complications:   · Leukocytosis- on admission was trending 11-13, 3/6 started trending upwards, ID consulted and following  Ertapenem started 3/12  · Hyponatremia:  128-133, Nephology following, FR 1500, IVF  · Abdominal abscess- drain removed 02/24; patient with increased abdominal pain, old site with purulent drainage  MRI: increased 5 1 cm abscess cavity, 3/13- abscess drain insertion  · Cholecystostomy: cholangiogram 03/09, capped, if tolerates for 2 weeks potential to remove cholecystostomy drain  · Anemia: symptomatic, transfused 2/16, 3/11 with 1 unit pRBC    Functional Status Upon Admission to [facility]:  Physical Therapy:Ambulation 36' with RW Min A, Min A for transfers with RW  Occupational Therapy: UB ADLs Min A, LB ADLs Min A, supervision toileting     Functional Status Upon Discharge from [facility]:   Physical therapy: mobility, transfers, ambulation: in room privileges with RW prior to procedure on 03/13; s/p abscess drain insertion and right thoracentesis, unable to participate in therapy  Occupational therapy: ADL- set-up, toileting- independent    Discharge Diagnosis: Impairment of mobility, safety and Activities of Daily Living (ADLs) due to Debility:  16  Debility (Non-cardiac/Non-pulmonary)    Discharge Medications:   See after visit summary for reconciled discharge medications provided to patient and family  Condition at Discharge: fair     Discharge instructions/Information to patient and family:   See after visit summary for information provided to patient and family  Provisions for Follow-Up Care:  See after visit summary for information related to follow-up care and any pertinent home health orders        Future Appointments   Date Time Provider Torie Li   3/16/2023  3:00 PM Fidencio Sanford MD HEM ONC [facility]   4/6/2023 11:00 AM Veronica Anguiano PA-C HEM ONC [facility]   4/20/2023  2:20 PM Fidencio Sanford MD HEM ONC [facility]   5/3/2023 11:30 AM Veronica Anguiano PA-C HEM ONC [facility]   5/18/2023 11:00 AM Veronica Anguiano PA-C HEM [facility]   6/9/2023 11:00 AM Ronni Redding PA-C HEM ONC [facility]   6/22/2023  1:40 PM Michel Dc MD HEM ONC [facility]   7/6/2023 11:00 AM Ronni Redding PA-C HEM ONC [facility]   7/26/2023 11:00 AM Michel Dc MD HEM ONC [facility]       Disposition: Acute care hospital    Planned Readmission: No    Discharge Medications:  See after visit summary for reconciled discharge medications provided to patient and family        Facility Administered Medications Prior to Discharge:    Current Facility-Administered Medications   Medication Dose Route Frequency Provider Last Rate   • acetaminophen  650 mg Oral Q4H PRN Jaqui Valdez, DO     • alteplase  2 mg Intracatheter Once GAURANG Pearl     • aspirin  81 mg Oral Daily Jaqui Valdez, DO     • atorvastatin  40 mg Oral Daily Jaqui Valdez, DO     • calcium carbonate  500 mg Oral TID PRN GAURANG Peterson     • collagenase   Topical Daily Jaqui Valdez, DO     • DULoxetine  30 mg Oral Daily Jaqui Valdez, DO     • ertapenem  1,000 mg Intravenous Q24H Bang Lopes MD 1,000 mg (03/13/23 1740)   • hydrOXYzine HCL  10 mg Oral HS Jaqui Valdez, DO     • insulin lispro  1-6 Units Subcutaneous TID AC Jaqui Valdez, DO     • insulin lispro  1-6 Units Subcutaneous HS Jaqui Valdez, DO     • magnesium sulfate  4 g Intravenous Once Manny Bruno MD     • melatonin  6 mg Oral HS GAURANG Pearl     • methocarbamol  500 mg Oral BID PRN Jaqui Valdez, DO     • midodrine  5 mg Oral TID AC GAURANG Coombs     • ondansetron  4 mg Intravenous Q4H PRN Jaqui Valdez, DO     • oxyCODONE  2 5 mg Oral Q4H PRN Jaqui Valdez, DO     • oxyCODONE  5 mg Oral Q4H PRN Jaqui Valdez, DO     • pantoprazole  40 mg Oral BID AC Jaqui Valdez, DO     • simethicone  80 mg Oral 4x Daily (with meals and at bedtime) Jaqui Valdez, DO     • sodium bicarbonate  1,300 mg Oral TID after meals Jordyn Benítez MD     • tamsulosin  0 4 mg Oral Daily With [address] Magalys Brizuela, DO Mal Enriquez, [address] [facility]

## 2023-03-14 NOTE — PROGRESS NOTES
NEPHROLOGY PROGRESS NOTE   Norma Tabor 61 y o  male MRN: [id number]  Unit/Bed#: -41 Encounter: [id number]    ASSESSMENT & PLAN:  19-year-old male with history of chronic kidney disease stage III, hypertension, diabetes mellitus, history of metastatic colon cancer status post partial colectomy and diabetic ileostomy complicated by intra-abdominal abscess/liver metastasis/cholecystitis requiring recurrent hospitalization  Recently presented to [facility] in February found to have sepsis and was treated with antibiotics, was found to have cholecystitis and abdominal drain was placed  Nephrology was consulted on 3/8 for management of elevated creatinine on top of pre-existing chronic kidney disease  Baseline creatinine was 1 2-1 4 but creatinine was elevated at 1 5-1 7 for which nephrology was consulted    Elevated serum creatinine  -Baseline creatinine: 1 2-1 4 mg/dl  -Admission creatinine: [facility] with admission creatinine of 2 5 which resolved at the time of initial consult at [facility]  -Etiology: Elevated creatinine was thought to be prerenal  [facility] Course: Currently stable at creatinine 1 4  -Plan:   · Renal function improving to creatinine 1 39 mg/dL, sodium level improving to 133 with IV fluids  GI output improving  Blood pressure has improved  We will stop further IV fluids and monitor  Discussed with primary team about stopping IV fluids and agreed with the plan  · Avoid nephrotoxins and dose all medications per EGFR  · Avoid hypotension  Chronic Kidney Disease Stage 3  -Outpatient Nephrologist: none   -Baseline Creatinine: 1 2-1 4  -Avoid Nephrotoxins and Dose all medications per eGFR  -Will need outpatient follow up after discharge  Hyponatremia  -Sodium level was 131-138 prior to rehab  -Possibly due to volume depletion, sodium level improving with IV normal saline, level today 133, continue to monitor  Also possibility of underlying SIADH  -Continue fluid restriction 1 5 L/day    Metabolic acidosis suspected due to increased GI loss with diarrhea  - continue oral sodium bicarbonate tablets  Bicarb level currently stable at 21    Hypomagnesemia: Magnesium level 1 0, replaced with IV magnesium sulfate  Discussed with primary team and agreed with the placement    Orthostatic hypotension suspected to be due to intravascular volume depletion, was given iv fluids and BP better  Continue midodrine 5 mg 3 times daily  Avoid hypotension    Bilateral pleural effusion, s/p thoracentesis on 3/14 around 1600 mL    Cholecystitis status post percutaneous cholecystostomy tube complicated by [place] enteric fistula, repeat tube check on 3/7 with no evidence of fistula  Currently with percutaneous cholecystostomy tube that is capped  Having loose bowel movements through ileostomy but improving  -Management per GI team    Left hepatic lobe abscess status post drain removal on 2/24 with recurrence of abscess on MRI abd, status post IR drainage on 3/13  Currently on antibiotic-ertapenem per ID recommendation    Metastatic colon adenocarcinoma status postresection, chemo, recent hepatic resection and ablation, status post transverse colon resection with ileostomy  Anemia, status post PRBC on 3/11  Hemoglobin today acceptable at 8 6 g/dL, continue to monitor per primary team      SUBJECTIVE:  Complains of dizziness on standing rapidly  No nausea vomiting    OBJECTIVE:  Current Weight: Weight - Scale: 80 1 kg (176 lb 9 4 oz)  Vitals:    03/14/23 0605   BP: 126/84   Pulse: 102   Resp: 17   Temp: 98 7 °F (37 1 °C)   SpO2: 93%       Intake/Output Summary (Last 24 hours) at 3/14/2023 0959  Last data filed at 3/14/2023 0700  Gross per 24 hour   Intake 130 ml   Output 785 ml   Net -655 ml       Physical Exam  General:  Ill looking, awake    Eyes: Conjunctivae pink,  Sclera anicteric  ENT: lips and mucous membranes moist  Neck: supple Chest: Clear to Auscultation both lungs,  no crackles, ronchus or wheezing  CVS: S1 & S2 present, normal rate, regular rhythm, no murmur  Abdomen: soft, non-tender, non-distended, Bowel sounds normoactive   Ileostomy +   Extremities: no edema of  legs  Skin: no rash  Neuro: awake, alert, oriented  Psych: Mood and affect appropriate     Medications:    Current Facility-Administered Medications:   •  acetaminophen (TYLENOL) tablet 650 mg, 650 mg, Oral, Q4H PRN, [facility] , 650 mg at 03/01/23 2158  •  alteplase (CATHFLO) injection 2 mg, 2 mg, Intracatheter, Once, GAURANG Hopson  •  aspirin chewable tablet 81 mg, 81 mg, Oral, Daily, [facility] , 81 mg at 03/13/23 3964  •  atorvastatin (LIPITOR) tablet 40 mg, 40 mg, Oral, Daily, [facility] , 40 mg at 03/13/23 1367  •  calcium carbonate (TUMS) chewable tablet 500 mg, 500 mg, Oral, TID PRN, GAURANG Keith, 500 mg at 03/13/23 1754  •  collagenase (SANTYL) ointment, , Topical, Daily, [facility], Given by Other at 03/13/23 1759  •  DULoxetine (CYMBALTA) delayed release capsule 30 mg, 30 mg, Oral, Daily, [facility], 30 mg at 03/13/23 6507  •  ertapenem (INVanz) 1,000 mg in sodium chloride 0 9 % 50 mL IVPB, 1,000 mg, Intravenous, Q24H, Raquel Fisher MD, Last Rate: 100 mL/hr at 03/13/23 1740, 1,000 mg at 03/13/23 1740  •  hydrOXYzine HCL (ATARAX) tablet 10 mg, 10 mg, Oral, HS, [facility], 10 mg at 03/13/23 2030  •  insulin lispro (HumaLOG) 100 units/mL subcutaneous injection 1-6 Units, 1-6 Units, Subcutaneous, TID AC, 1 Units at 03/12/23 1552 **AND** Fingerstick Glucose (POCT), , , 4x Daily AC and at bedtime, [facility]   •  insulin lispro (HumaLOG) 100 units/mL subcutaneous injection 1-6 Units, 1-6 Units, Subcutaneous, HS, Carlota Castro DO, 1 Units at 03/06/23 2147  •  melatonin tablet 6 mg, 6 mg, Oral, HS, GAURANG Hernandez, 6 mg at 03/13/23 2143  •  methocarbamol (ROBAXIN) tablet 500 mg, 500 mg, Oral, BID PRN, Koby Doll VILMA Eisenberg, DO, 500 mg at 03/13/23 1754  •  midodrine (PROAMATINE) tablet 5 mg, 5 mg, Oral, TID AC, GAURANG Marcos, 5 mg at 03/14/23 1414  •  ondansetron [facility]) injection 4 mg, 4 mg, Intravenous, Q4H PRN, Earmildred Nazario, DO, 4 mg at 03/13/23 1740  •  oxyCODONE (ROXICODONE) IR tablet 2 5 mg, 2 5 mg, Oral, Q4H PRN, Earvin Keldron, DO  •  oxyCODONE (ROXICODONE) IR tablet 5 mg, 5 mg, Oral, Q4H PRN, Earmildred Keldron, DO, 5 mg at 03/14/23 0556  •  pantoprazole (PROTONIX) EC tablet 40 mg, 40 mg, Oral, BID AC, Mari Dasch, DO, 40 mg at 03/14/23 0607  •  simethicone (MYLICON) chewable tablet 80 mg, 80 mg, Oral, 4x Daily (with meals and at bedtime), Mari Nazario, DO, 80 mg at 03/13/23 2143  •  sodium bicarbonate tablet 1,300 mg, 1,300 mg, Oral, TID after meals, Ramonita Parrish MD, 1,300 mg at 03/13/23 1757  •  sodium chloride 0 9 % infusion, 75 mL/hr, Intravenous, Continuous, GAURANG Colyb, Last Rate: 75 mL/hr at 03/14/23 0920, 75 mL/hr at 03/14/23 0920  •  tamsulosin (FLOMAX) capsule 0 4 mg, 0 4 mg, Oral, Daily With Dinner, Mari Nazario, DO, 0 4 mg at 03/13/23 1754    Invasive Devices:        Lab Results:   Results from last 7 days   Lab Units 03/14/23  0737 03/14/23  0555 03/13/23  0608 03/12/23  0546 03/11/23  1052 03/10/23  0647 03/09/23  0647   WBC Thousand/uL 17 28*  --  19 28* 20 27* 16 91* 16 09* 16 32*   HEMOGLOBIN g/dL 8 6*  --  7 9* 8 2* 7 1* 7 3* 7 4*   HEMATOCRIT % 27 2*  --  25 6* 26 3* 24 0* 23 4* 23 6*   PLATELETS Thousands/uL 320  --  325 365 345 350 357   POTASSIUM mmol/L  --  4 2 4 0 4 2 4 1 4 2 4 1   CHLORIDE mmol/L  --  105 104 103 103 102 105   CO2 mmol/L  --  21 22 21 20* 18* 18*   BUN mg/dL  --  19 20 20 20 20 19   CREATININE mg/dL  --  1 39* 1 47* 1 45* 1 53* 1 38* 1 36*   CALCIUM mg/dL  --  7 9* 8 0* 8 1* 8 0* 8 2* 8 3   MAGNESIUM mg/dL  --  1 0*  --   --   --   --   --    ALK PHOS U/L  --   --   --   --  947* 984* 972*   ALT U/L  --   --   --   --  13 15 16   AST U/L  --   -- --   --  70* 86* 87*       Previous work up:         Portions of the record may have been created with voice recognition software  Occasional wrong word or "sound a like" substitutions may have occurred due to the inherent limitations of voice recognition software  Read the chart carefully and recognize, using context, where substitutions have occurred  If you have any questions, please contact the dictating provider

## 2023-03-14 NOTE — PROGRESS NOTES
Progress Note - Surgical Oncology  Denisse Benavides 61 y o  male MRN: [id number]  Unit/Bed#: -01 Encounter: [id number]      Objective: Having right sided chest discomfort  Had a right thoracentesis yesterday with 1600 mls drained  Sats at 91% on room air  Tolerating a diabetic diet  Intraabdominal drain placed in left upper quadrant yesterday  No fevers, no chills  Having bowel movements  On Ertapenem since 3/12/23    400 UA  65 IR drain  1 BM    Blood pressure 126/84, pulse 102, temperature 98 7 °F (37 1 °C), temperature source Oral, resp  rate 17, height 5' 9" (1 753 m), weight 80 1 kg (176 lb 9 4 oz), SpO2 93 %  ,Body mass index is 26 08 kg/m²  Intake/Output Summary (Last 24 hours) at 3/14/2023 0716  Last data filed at 3/14/2023 0601  Gross per 24 hour   Intake 10 ml   Output 485 ml   Net -475 ml       Invasive Devices     Central Venous Catheter Line  Duration           Port A Cath 03/10/22 Right Chest 368 days          Drain  Duration           Ileostomy  days    Cholecystostomy Tube 17 days    Abscess Drain Abdomen <1 day                Physical Exam:   Abdomen: soft, non distended, midline wound with dry dressing, LUQ drain serosanguinous this am  Extremities: no pedal edema    Lab, Imaging and other studies:  WBC pending  VTE Pharmacologic Prophylaxis: ASA 81  VTE Mechanical Prophylaxis:     Assessment:  Metastatic colon cancer  Day #1 right thoracentesis  Day #1 LUQ intra abdominal drain placement    Plan:  1  Will get a CXR to r/o right pneumo since right thoracentesis late yesterday  2  Follow WBC  3  Con't primary care  4   Con't Infectious disease recs

## 2023-03-15 PROBLEM — E83.42 HYPOMAGNESEMIA: Status: RESOLVED | Noted: 2023-03-14 | Resolved: 2023-03-15

## 2023-03-15 LAB
BACTERIA SPEC ANAEROBE CULT: NORMAL
BACTERIA SPEC BFLD CULT: ABNORMAL
BACTERIA SPEC BFLD CULT: ABNORMAL
BACTERIA WND AEROBE CULT: ABNORMAL
GRAM STN SPEC: ABNORMAL

## 2023-03-15 NOTE — PHYSICAL THERAPY NOTE
ARC PT DISCHARGE SUMMARY  Pt admitted to [facility] with dx of sepsis  Upon initial evaluation pt functioning at minAx1 w/o use of AD for transfers, total A for ambulation w/o AD  Pt with intermittent orthostatic hypotension during rehab stay and dizziness at times which effected mobility status as well as ongoing decreased activity tolerance and fatigue but pt did make steady gains and achieved Gregory/S goals with use of RW for household distances, w/c for community distances, and was recommended to have FF setup with home PT to f/u with pt upon d/c  Initial d/c planned for 3/10/23, but due to need for ongoing medical needs pt's d/c pushed back to 3/13/23  Pt began to decline functionally on 3/13/23 requiring modAx1 for transfers and unable to tolerate ambulation due to c/o dizziness  At this time pt d/c'd to acute care for further medical workup  Recommend pt return to [facility] when medically stable in order to maximize his independence with functional mobilities

## 2023-03-16 LAB
BACTERIA SPEC BFLD CULT: NO GROWTH
GRAM STN SPEC: NORMAL
GRAM STN SPEC: NORMAL

## 2023-03-16 NOTE — PROGRESS NOTES
03/15/23 0920   Hello, [Guardian’s Name / Patient’s Murali Moe, this is [Caller Murali Moe from [facility], and our clinical care team wanted to check on you / your child after your recent visit to the hospital  It will only take 3-5 minutes  Is this a good time? Discharge Call Type/ Specific Diagnosis: General Call   Call Complete   Attempted Number of Calls 1   Discharge phone call complete?  Does not meet criteria  (D/C to acute hospital)

## 2023-03-17 LAB — BACTERIA BLD CULT: NORMAL

## 2023-03-18 LAB
ALL TARGETS: NOT DETECTED
BACTERIA BLD CULT: ABNORMAL
GRAM STN SPEC: ABNORMAL

## 2023-03-19 PROBLEM — Z71.89 GOALS OF CARE, COUNSELING/DISCUSSION: Status: ACTIVE | Noted: 2023-03-19

## 2023-03-20 LAB — FUNGUS SPEC CULT: NORMAL

## 2023-03-21 LAB
MYCOBACTERIUM SPEC CULT: NORMAL
RHODAMINE-AURAMINE STN SPEC: NORMAL

## 2023-03-22 PROBLEM — R56.9 SEIZURE-LIKE ACTIVITY (HCC): Status: ACTIVE | Noted: 2023-03-22

## 2023-03-22 NOTE — OCCUPATIONAL THERAPY NOTE
OT Discharge Summary      Pt admitted to [facility] with dx of sepsis  Upon initial evaluation pt functioning at minAx1 w/o use of AD for transfers, total A for ambulation w/o AD  Pt with intermittent orthostatic hypotension during rehab stay and dizziness at times which effected mobility status as well as ongoing decreased activity tolerance and fatigue but pt did make steady gains and achieved Gregory/S goals with use of RW for household distances, w/c for community distances, and was recommended to have FF setup with home PT to f/u with pt upon d/c  Initial d/c planned for 3/10/23, but due to need for ongoing medical needs pt's d/c pushed back to 3/13/23  Pt began to decline functionally on 3/13/23 requiring modAx1 for transfers and unable to tolerate ambulation due to c/o dizziness  At this time pt d/c'd to acute care for further medical workup  Recommend pt return to [facility] when medically stable in order to maximize his independence with functional mobilities

## 2023-03-23 LAB
BACTERIA WND AEROBE CULT: ABNORMAL
GRAM STN SPEC: ABNORMAL
GRAM STN SPEC: ABNORMAL

## 2023-03-27 LAB — FUNGUS SPEC CULT: NORMAL

## 2023-03-28 PROBLEM — K92.1 MELENA: Status: ACTIVE | Noted: 2023-03-28

## 2023-03-28 LAB
MYCOBACTERIUM SPEC CULT: NORMAL
RHODAMINE-AURAMINE STN SPEC: NORMAL

## 2023-03-29 ENCOUNTER — ANESTHESIA EVENT (INPATIENT)
Dept: GASTROENTEROLOGY | Facility: HOSPITAL | Age: 58
End: 2023-03-29

## 2023-03-29 ENCOUNTER — ANESTHESIA (INPATIENT)
Dept: GASTROENTEROLOGY | Facility: HOSPITAL | Age: 58
End: 2023-03-29

## 2023-03-29 PROBLEM — K92.2 GI BLEED: Status: ACTIVE | Noted: 2023-03-28

## 2023-03-29 RX ORDER — PROPOFOL 10 MG/ML
INJECTION, EMULSION INTRAVENOUS AS NEEDED
Status: DISCONTINUED | OUTPATIENT
Start: 2023-03-29 | End: 2023-03-29

## 2023-03-29 RX ORDER — SODIUM CHLORIDE, SODIUM LACTATE, POTASSIUM CHLORIDE, CALCIUM CHLORIDE 600; 310; 30; 20 MG/100ML; MG/100ML; MG/100ML; MG/100ML
INJECTION, SOLUTION INTRAVENOUS CONTINUOUS PRN
Status: DISCONTINUED | OUTPATIENT
Start: 2023-03-29 | End: 2023-03-29

## 2023-03-29 RX ORDER — LIDOCAINE HYDROCHLORIDE 20 MG/ML
INJECTION, SOLUTION EPIDURAL; INFILTRATION; INTRACAUDAL; PERINEURAL AS NEEDED
Status: DISCONTINUED | OUTPATIENT
Start: 2023-03-29 | End: 2023-03-29

## 2023-03-29 RX ADMIN — SODIUM CHLORIDE, SODIUM LACTATE, POTASSIUM CHLORIDE, AND CALCIUM CHLORIDE: .6; .31; .03; .02 INJECTION, SOLUTION INTRAVENOUS at 15:22

## 2023-03-29 RX ADMIN — LIDOCAINE HYDROCHLORIDE 30 MG: 20 INJECTION, SOLUTION EPIDURAL; INFILTRATION; INTRACAUDAL; PERINEURAL at 15:24

## 2023-03-29 RX ADMIN — PROPOFOL 50 MG: 10 INJECTION, EMULSION INTRAVENOUS at 15:24

## 2023-03-29 RX ADMIN — PROPOFOL 20 MG: 10 INJECTION, EMULSION INTRAVENOUS at 15:28

## 2023-03-29 RX ADMIN — PROPOFOL 20 MG: 10 INJECTION, EMULSION INTRAVENOUS at 15:31

## 2023-03-29 NOTE — ANESTHESIA PREPROCEDURE EVALUATION
Procedure:  EGD    Relevant Problems   CARDIO   (+) Benign essential hypertension       (+) Mixed hyperlipidemia      ENDO   (+) Type 2 diabetes mellitus with hyperlipidemia (HCC)      GI/HEPATIC   (+) Colon cancer metastasized to liver (HCC)   (+) Malignant neoplasm of transverse colon (HCC)      /RENAL   (+) Acute on chronic kidney failure (HCC)   (+) Nephrolithiasis   (+) Stage 3 chronic kidney disease (HCC)      HEMATOLOGY   (+) Anemia   (+) Iron deficiency anemia, unspecified      NEURO/PSYCH   (+) Depression      PULMONARY   (+) Chronic bilateral pleural effusions   (+) Pleural effusion        Physical Exam    Airway    Mallampati score: III  TM Distance: <3 FB  Neck ROM: full     Dental       Cardiovascular  Rhythm: regular, Rate: normal,     Pulmonary  Breath sounds clear to auscultation,     Other Findings        Anesthesia Plan  ASA Score- 3     Anesthesia Type- IV sedation with anesthesia with ASA Monitors  Additional Monitors:   Airway Plan:           Plan Factors-    Chart reviewed  Existing labs reviewed  Patient summary reviewed  Induction- intravenous  Postoperative Plan-     Informed Consent- Anesthetic plan and risks discussed with patient  I personally reviewed this patient with the CRNA  Discussed and agreed on the Anesthesia Plan with the CRNA  Carisa Nevarez

## 2023-03-29 NOTE — ANESTHESIA POSTPROCEDURE EVALUATION
Post-Op Assessment Note    CV Status:  Stable  Pain Score: 0    Pain management: adequate     Mental Status:  Alert and awake   Hydration Status:  Euvolemic   PONV Controlled:  Controlled   Airway Patency:  Patent   Two or more mitigation strategies used for obstructive sleep apnea   Post Op Vitals Reviewed: Yes      Staff: Anesthesiologist, CRNA         There were no known notable events for this encounter      BP   97/58   Temp     Pulse  90   Resp   19   SpO2   97

## 2023-03-30 ENCOUNTER — HOME CARE VISIT (OUTPATIENT)
Dept: HOME HOSPICE | Facility: HOSPICE | Age: 58
End: 2023-03-30

## 2023-03-30 ENCOUNTER — HOME CARE VISIT (OUTPATIENT)
Dept: HOME HEALTH SERVICES | Facility: HOME HEALTHCARE | Age: 58
End: 2023-03-30

## 2023-03-30 NOTE — CASE COMMUNICATION
Duy is for IPU on [facility] 7 [phone] 23 62 yo male [facility] at [facility] [place] Patient is a 50-year-old male presented with melena  He was originally admitted for sepsis and elevated LFTs on 3/14/2023  At that time he was identified to have metastatic transverse colon adenocarcinoma  He underwent laparotomy, liver resection and ablation in 3835 which was complicated by hematoma, suspected leak from the transverse colon anastomos is subsequently underwent right hemicolectomy, reexploration, partial descending colectomy with diverting loop ileostomy  Course is complicated by multiple intra-abdominal abscess for which he has had IR drain in the perihepatic collection in the past   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

## 2023-04-03 LAB — FUNGUS SPEC CULT: NORMAL

## 2023-04-04 LAB
MYCOBACTERIUM SPEC CULT: NORMAL
RHODAMINE-AURAMINE STN SPEC: NORMAL

## 2023-05-02 LAB
MYCOBACTERIUM SPEC CULT: NORMAL
RHODAMINE-AURAMINE STN SPEC: NORMAL

## 2023-06-13 NOTE — PHYSICAL THERAPY NOTE
Physical Therapy Re-Evaluation     Patient's Name: Tamera Rizzo    Admitting Diagnosis  Malignant neoplasm of transverse colon ([facility] ) [C18 4]    Problem List  Patient Active Problem List   Diagnosis    Type 2 diabetes mellitus without complication, with long-term current use of insulin ([facility] )    Benign essential hypertension    Mixed hyperlipidemia    Erectile dysfunction    Low testosterone    Obesity (BMI 30-39  9)    Testicular hypogonadism    Incarcerated umbilical hernia    Left ureteral calculus    Type 2 diabetes mellitus with hyperlipidemia (HCC)    Colonic mass    Microcytic anemia    Hypokalemia    Transaminitis    Thrombocytosis    Iron deficiency anemia, unspecified    Malignant neoplasm of transverse colon (HCC)    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)    Colon cancer metastasized to liver (HCC)    Colostomy prolapse (HCC)    Other fatigue    Cervical radiculopathy    Encephalopathy    Flash pulmonary edema (HCC)    MR (mitral regurgitation)    Bacteremia    ESBL (extended spectrum beta-lactamase) producing bacteria infection    Acute respiratory failure with hypoxia ([facility] )       Past Medical History  Past Medical History:   Diagnosis Date    Abdominal pain 03/12/2022    Acute renal failure ([facility] )     [id number] resolved    Cancer ([facility] )     Diabetes mellitus ([facility] )     Enteritis 08/23/2016    Gastroparesis due to DM ([facility] ) 08/23/2016    GERD (gastroesophageal reflux disease)     Hernia, ventral 08/04/2016    Hyperlipidemia     Hypertension     Morbid obesity ([facility] ) 04/17/2018    Postoperative visit 03/02/2022    SIRS (systemic inflammatory response syndrome) ([facility] ) 03/12/2022    Snoring     Stage 3a chronic kidney disease ([facility] ) 02/19/2022       Past Surgical History  Past Surgical History:   Procedure Laterality Date    COLONOSCOPY      COLOSTOMY N/A 02/20/2022    Procedure: COLOSTOMY LOOP, diverting;  Surgeon: Paul Amor MD;  Location: [facility];  Service: General ESOPHAGOGASTRODUODENOSCOPY N/A 08/24/2016    Procedure: ESOPHAGOGASTRODUODENOSCOPY (EGD); Surgeon: Luz Stallings MD;  Location: [facility];   Service:     EXPLORATORY LAPAROTOMY W/ BOWEL RESECTION N/A 11/16/2022    Procedure: LAPAROTOMY EXPLORATORY W/ BOWEL RESECTION- VAC PLACEMENT;  Surgeon: Dary Zavala MD;  Location: [facility];  Service: Surgical Oncology    EXPLORATORY LAPAROTOMY W/ BOWEL RESECTION N/A 11/17/2022    Procedure: LAPAROTOMY EXPLORATORY W/ BOWEL RESECTION;  Surgeon: Dary Zavala MD;  [facility];  Service: Surgical Oncology    ILEOSTOMY N/A 11/17/2022    Procedure: ILEOSTOMY;  Surgeon: Dary Zavala MD;  [facility];  Service: Surgical Oncology    ILEOSTOMY CLOSURE N/A 11/7/2022    Procedure: REVERSAL COLOSTOMY;  Surgeon: Demetrio Ibarra MD;  [facility];  Service: Surgical Oncology    IR PORT PLACEMENT  03/10/2022    KIDNEY STONE SURGERY      LIVER BIOPSY LAPAROSCOPIC N/A 02/20/2022    Procedure: DIAGNOSTIC LAPAROSCOPIC LIVER BIOPSY, DIVERTING LOOP COLOSTOMY ;  Surgeon: Zully Jamil MD;  [facility];  Service: General    LIVER LOBECTOMY N/A 11/7/2022    Procedure: SEGMENT 3 LIVER RESECTION, ABLATION, INTRAOPERATIVE U/S OF LIVER, PERITONEAL BIOPSY;  Surgeon: Demetrio Ibarra MD;  [facility];  Service: Surgical Oncology    RIGHT COLON RESECTION N/A 11/7/2022    Procedure: EX LAP, TRANVERSE COLON RESECTION;  Surgeon: Demetrio Ibarra MD;  [facility];  Service: Surgical Oncology    TONSILLECTOMY      UMBILICAL HERNIA REPAIR LAPAROSCOPIC N/A 04/26/2019    Procedure: LAPAROSCOPIC UMBILICAL HERNIA REPAIR;  Surgeon: Zully Jamil MD;  [facility];  Service: General    VAC DRESSING APPLICATION N/A 73/58/7189    Procedure: APPLICATION VAC DRESSING ABDOMEN/TRUNK;  Surgeon: Dary Zavala MD;  [facility];  Service: Surgical Oncology          11/23/22 1201   PT Last Visit   PT Visit Date 11/23/22   Note Type   Note type Re-Evaluation   Pain Assessment   Pain Assessment Tool FLACC   Pain Rating: FLACC (Rest) - Face 1   Pain Rating: FLACC (Rest) - Legs 1   Pain Rating: FLACC (Rest) - Activity 1   Pain Rating: FLACC (Rest) - Cry 1   Pain Rating: FLACC (Rest) - Consolability 1   Score: FLACC (Rest) 5   Pain Rating: FLACC (Activity) - Face 2   Pain Rating: FLACC (Activity) - Legs 1   Pain Rating: FLACC (Activity) - Activity 1   Pain Rating: FLACC (Activity) - Cry 1   Pain Rating: FLACC (Activity) - Consolability 1   Score: FLACC (Activity) 6   Restrictions/Precautions   Weight Bearing Precautions Per Order No   Other Precautions Contact/isolation;Cognitive; Chair Alarm; Bed Alarm;Multiple lines;Telemetry;O2;Fall Risk;Pain  (wound vac, VICTORINO drain, NGT)   Home Living   Additional Comments See IE for home set up   Prior Function   Comments See IE for PLOF   General   Family/Caregiver Present No   Cognition   Overall Cognitive Status Impaired   Arousal/Participation Responsive   Attention Attends with cues to redirect   Memory Decreased recall of precautions;Decreased short term memory   Following Commands Follows one step commands with increased time or repetition   Comments Decreased safety awareness and insight  Subjective   Subjective Pleasant and agreeable to participate   RLE Assessment   RLE Assessment   (functionally 2/5)   LLE Assessment   LLE Assessment   (functionally 2/5)   Bed Mobility   Supine to Sit 2  Maximal assistance   Additional items Assist x 2;HOB elevated; Increased time required;Verbal cues;LE management   Sit to Supine 2  Maximal assistance   Additional items Assist x 2; Increased time required;Verbal cues;LE management   Additional Comments Supine in bed upon PT arrival   Sits EOB with Mod A for trunk  Left supine in bed with bed alarm intact and all needs in reach     Balance   Static Sitting Poor   Dynamic Sitting Poor -   Activity Tolerance   Activity Tolerance Patient limited by fatigue;Patient limited by pain Medical Staff Made Aware OT, OTS   Nurse Made Aware RN cleared pt to be seen by PT   Assessment   Prognosis Fair   Problem List Decreased strength;Decreased range of motion;Decreased endurance; Impaired balance;Decreased mobility; Decreased cognition; Impaired judgement;Decreased safety awareness;Decreased coordination;Pain   Assessment Pt seen for PT re-evaluation s/p ex-lap with bowel resection and wound vac placement  Pt is s/p extubation  Pt is a 61 y o  M who [facility] with colon cancer with mets to liver on 11/7/22  Pt presents with decreased strength, balance, endurance that contribute to limitations in bed mobility, functional transfers, functional mobility  Pt requires Max A x 2 for supine<>sit at this time  Pt left supine in bed with bed alarm intact with all needs in reach  Pt will benefit from skilled therapy in order to address current impairments and functional limitations  PT to follow pt and recommending rehab once medically cleared  The patient's AM-PAC Basic Mobility Inpatient Short Form Raw Score is 6  A Raw score of less than or equal to 16 suggests the patient may benefit from discharge to post-acute rehabilitation services  Please also refer to the recommendation of the Physical Therapist for safe discharge planning  Barriers to Discharge Inaccessible home environment;Decreased caregiver support   Goals   Patient Goals to get better   STG Expiration Date 12/07/22   Plan   Treatment/Interventions Functional transfer training;LE strengthening/ROM; Therapeutic exercise; Endurance training;Patient/family training;Equipment eval/education; Bed mobility;Gait training;Cognitive reorientation;Spoke to nursing   PT Frequency 2-3x/wk   Recommendation   PT Discharge Recommendation Post acute rehabilitation services   AM-PAC Basic Mobility Inpatient   Turning in Bed Without Bedrails 1   Lying on Back to Sitting on Edge of Flat Bed 1   Moving Bed to Chair 1   Standing Up From Chair 1   Walk in Room 1   Climb 3-5 Stairs 1   Basic Mobility Inpatient Raw Score 6   Turning Head Towards Sound 3   Follow Simple Instructions 3   Low Function Basic Mobility Raw Score 12   Low Function Basic Mobility Standardized Score 18 33   Highest Level Of Mobility   -HLM Goal 2: Bed activities/Dependent transfer   -HL Achieved 3: Sit at edge of bed   Modified [place] Scale   Modified [place] Scale 4         Jerilyn Oconnell, PT, DPT no vascular compromise

## 2023-11-06 NOTE — CONSULTS
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  • s/p hepatic resection and reversal of colostomy on 11/7  • Return to the OR 11/16 for right hemicolectomy with partial descending colectomy colocolonic anastomosis with loop ileostomy and mid line abdominal VAC placement  • Possible fistula formation  • Continue local wound care  • Consult general surgery to follow  • Monitor fever/CBC/wound  • Recommend I/O d/t ileostomy    Acute cholecystitis  • s/p percutaneous tube placement  • Currently draining bloody discharge  • IR evaluated 12/14 states may remain bloody for several days  • No further abdominal pain    Bacteremia/abdominal abscess  • Recommend consult ID  • Currently on ertapenem for ESBL E  Coli  • Monitor CBC/fever  • VICTORINO drain currently with thick milky drainage    Hypertension  • Continue amlodipine  • Monitor BP curve  • Titrate based on same    Diabetes mellitus  • Continue Lantus 12 units daily/sliding scale/humalog 3 u w/meals  • Continue diabetic diet  • Monitor trend titrate based on same    Acute on chronic renal failure  • Stage III Baseline 1 4  • Lisinopril currently on hold  • Recommend biweekly BMP  • Avoid nephrotoxic medications    Anemia  • Multifactorial due to recent infection, surgery, CKD stage III  • Monitor CBC  • Transfuse if hemoglobin less than 8    Atypical chest pain  • With elevation in troponin/EKG changes  • Cardiology cleared pt for discharge  • Did not recommend any further work up    Situational depression  • Recommend neuropsychology consult  • Patient not interested in medications at this point in time    Malnutrition  · Recommend dietician consultation to optimize wound healing        Subjective/ HPI: Patient seen and examined     Josr Vazquez is a 79-year-old male with past medical history of diabetes, hyperlipidemia, obesity, gastroparesis, metastatic colon cancer diagnosed back in February, at that point time he underwent colectomy with colostomy formation  He then underwent chemotherapy  Follow-up CAT scans did show lesions in his liver and lung therefore it was recommended he undergo a hepatic resection as well as reversal of his colostomy  He underwent that initial procedure on November 7, unfortunately he did have several complications including having to return to the operating room on the 16th for a right hemicolectomy with partial descending colectomy colocolonic anastomosis with loop ileostomy as well as a midline abdominal VAC placement  He was discharged to SNF on December 3 however they refused to take admission and he was brought back to the acute hospital on December 4  He then developed acute cholecystitis requiring drain placement, as well as bacteremic and was also found to have an abdominal abscesses collection a drain was placed  Cultures were positive for ESBL E  coli  The patient was followed by infectious disease was transitioned to ertapenem  He also has possible fistula formation as well  He was being followed closely by general surgery and oncology  He also developed acute on chronic renal failure and was followed by renal  His renal function has since improved  On December 14 he did develop some left-sided chest pain while sitting in the chair, the patient described it as acute, squeezing in nature  He has not had any further episodes however he did have elevation in his troponin that have been improved from previous checks, echocardiogram done the beginning of November was normal did not show any wall motion abnormalities however he has not had any ischemic work-up  The patient does have several risk factors including diabetes, obesity, hyperlipidemia as well as an abnormal EKG  Cardiology cleared him for rehab   He is being transferred to [facility] and we are asked to follow along for medical management, however I would also recommend that infectious disease follow along as well as his other consultants including general surgery  ROS:   A 10 point ROS was performed; negative except as noted above  Social History:    Substance Use History:   Social History     Substance and Sexual Activity   Alcohol Use Never     Social History     Tobacco Use   Smoking Status Never   Smokeless Tobacco Never     Social History     Substance and Sexual Activity   Drug Use No       Family History:    Family History   Problem Relation Age of Onset   • Diabetes Mother         mellitus   • Lung cancer Mother    • Coronary artery disease Father          Review of Scheduled Meds:      Labs:     Results from last 7 days   Lab Units 22  0522 22  0523   WBC Thousand/uL 11 86* 14 37*   HEMOGLOBIN g/dL 8 0* 8 6*   HEMATOCRIT % 26 0* 27 5*   PLATELETS Thousands/uL 318 335     Results from last 7 days   Lab Units 22  0522 22  0523   SODIUM mmol/L 136 137   POTASSIUM mmol/L 3 7 3 9   CHLORIDE mmol/L 104 105   CO2 mmol/L 27 24   BUN mg/dL 16 18   CREATININE mg/dL 1 43* 1 62*   CALCIUM mg/dL 8 8 8 8                  Results from last 7 days   Lab Units 22  1116 22  0746 22  2054   POC GLUCOSE mg/dl 117 125 107       Lab Results   Component Value Date    BLOODCX No Growth After 5 Days  2022    BLOODCX No Growth After 5 Days  2022    BLOODCX No Growth After 5 Days  2022    BLOODCX No Growth After 5 Days   2022    URINECX No Growth <1000 cfu/mL 2016       Input and Output Summary (last 24 hours):     No intake or output data in the 24 hours ending 22 1610    Imaging:     No orders to display       *Labs /Radiology studiesLabs reviewed  *Medications reviewed and reconciled as needed  *Please refer to order section for additional ordered labs studies  *Case discussed with primary attending during morning huddle case rounds    Vitals:   Temp (24hrs), Av 5 °F (36 4 °C), Min:97 2 °F (36 2 °C), Max:98 1 °F (36 7 °C)    Temp:  [97 2 °F (36 2 °C)-98 1 °F (36 7 °C)] 98 1 °F (36 7 °C)  HR:  [90-98] 98  Resp:  [18-20] 18  BP: (148-156)/(80-88) 156/80  SpO2:  [95 %-99 %] 96 %  Body mass index is 33 kg/m²  Physical Exam:   GEN: No apparent distress, interactive; ill appearing; tearful  NEURO: Alert and oriented x3  HEENT: Pupils are equal and reactive, EOMI, mucous membranes are moist, face symmetrical  CV: S1 S2 regular, no MRG, no peripheral edema noted  RESP: Lungs are decreased bilaterally, no wheezes, rales or rhonchi noted, on 2L NC, respirations easy and non labored  GI: obese, soft non tender, non distended; +BS x4; RLQ perc choley tube draining clear sanguinous drainage, LLQ VICTORINO drain with milky colored drainage; ileostomy with loose brown stool; midline abdominal dressing in place; refer to media  : Voiding without difficulty  MUSC: Moves all extremities; generalized deconditioning  SKIN: pink, warm and dry, normal turgor, no rashes, lesions          Invasive Devices     Central Venous Catheter Line  Duration           Port A Cath 03/10/22 Right Chest 279 days          Peripheral Intravenous Line  Duration           Peripheral IV 12/11/22 Left Antecubital 2 days          Drain  Duration           Ileostomy LUQ 27 days    Abscess Drain Abdomen 5 days    Cholecystostomy Tube 2 days                 VTE Pharmacologic Prophylaxis: Heparin  Code Status: Level 1 - Full Code  Current Length of Stay: 0 day(s)    Total floor / unit time spent today 1 hour with more than 50% spent counseling/coordinating care  Counseling includes discussion with patient re: progress  and discussion with patient of his/her current medical state/information  Coordination of patient's care was performed in conjunction with primary service  Time invested included review of patient's labs, vitals, and management of their comorbidities with continued monitoring   In addition, this patient was discussed with medical team including physician and advanced extenders  The care of the patient was extensively discussed and appropriate treatment plan was formulated unique for this patient by supervising physician unless stated otherwise in their attestation statement  ** Please Note: voice to text software may have been used in the creation of this document   Audio transcription errors may occur** bilateral upper extremities/bilateral lower extremities/normal

## 2024-02-07 NOTE — PROGRESS NOTES
Hello-you were seen in our office for evaluation of your joint pains.  Your lab work showed a low positive SAMMY that can be seen in autoimmune disorders as well as healthy patients.    Given your pain, I think it would be reasonable to trial a medication called hydroxychloroquine to see how you respond as well as a short trial of steroids called prednisone over 12 days. Let's see how you feel on these medications. If they do not help your symptoms, your pain is more likely related to degenerative osteoarthritis rather than an autoimmune disease.    Progress Note - Infectious Disease   Vic Mart 61 y o  male MRN: [id number]  Unit/Bed#: -01 Encounter: [id number]      Impression/Recommendations:  SIRS versus sepsis  WBC, HR  Suspect due to persistent left intra-abdominal infection in setting of complex history below, recently completed antibiotics  ROS and exam otherwise negative  Recent Flu/RSV/COVID PCR, CXR negative  Subjectively improved with reinitiation of antibiotics  Rec:  • Continue ertapenem  • Follow up blood cultures  • Recheck CBC in AM  • Follow drain outputs closely  • Follow temperatures and hemodynamics closely     Recent intra-abdominal abscess  In the setting complex surgical history as below   Initially developed 11/2022 companied by ESBL E  coli bacteremia  Status post exploratory laparotomy, right hemicolectomy, temporary abdominal closure 11/16; re-exploration, partial left colectomy, primary ileocolonic anastomosis with proximal diverting loop ileostomy, midline fascial closure on 11/17  More recently developed abscess in hepatectomy bed and collection +/- leak at area of prior colonic anastamosis, 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 via wound   Status post IR drain into perihepatic collection 12/8  Pericolonic area not accessed  Cultures with ESBL E  Coli  Status post 7 days ertapenem after drain placement through 12/15  Hepatic bed collection improved, persistent left intra-abdominal collection  Rec:  · Continue antibiotics as above  · Continue hepatic bed drain  · Consult IR to see if left intra-abdominal collection amenable to drainage  · May need more prolonged course of antibiotics     Possible acute cholecystitis  Status post percutaneous cholecystostomy tube  Alk phos remains markedly elevated, possibly due to drain itself versus known intrahepatic metastases  Rec:  · Follow up MRCP per GI with close follow-up ongoing  · Recheck CMP in a m      Metastatic colon cancer  To liver, possible lung    Status post resection, chemotherapy, more recent hepatic resection and ablation, transverse colon resection      CKD  Baseline Cr 1 4     DM      Anemia  Status post multiple transfusions      The above impression and plan was discussed in detail with the patient and Dr Daryle Plum      Antibiotics:  Ertapenem #2    Subjective:  Patient seen on AM rounds  Feels better today  More energy  Ate full breakfast   Denies fevers, chills, sweats, nausea, vomiting, or diarrhea  24 Hour Events:  No documented fevers, chills, sweats, nausea, vomiting, or diarrhea  Objective:  Vitals:  Temp:  [97 4 °F (36 3 °C)-98 9 °F (37 2 °C)] 97 4 °F (36 3 °C)  HR:  [83-90] 86  Resp:  [16-17] 17  BP: (134-177)/(70-91) 134/78  SpO2:  [93 %-100 %] 100 %  Temp (24hrs), Av 1 °F (36 7 °C), Min:97 4 °F (36 3 °C), Max:98 9 °F (37 2 °C)  Current: Temperature: (!) 97 4 °F (36 3 °C)    Physical Exam:   General:  No acute distress  Eyes:  Normal lids and conjunctivae  ENT:  Normal external ears and nose  Neck:  Neck symmetric with midline trachea  Pulmonary:  Normal respiratory effort without accessory muscle use  Cardiovascular:  Regular rate and rhythm; no peripheral edema  Gastrointestinal:  No tenderness or distention  Musculoskeletal:  No digital clubbing or cyanosis  Skin:  No visible rashes; No palpable nodules  Neurologic:  Sensation grossly intact to light touch  Psychiatric:  Alert and oriented; Normal mood    Lab Results:  I have personally reviewed pertinent labs    Results from last 7 days   Lab Units 22  1300 22  1133 22  1136 12/15/22  0516 22  0522   POTASSIUM mmol/L 3 7  --   --  3 9 3 7   CHLORIDE mmol/L 104  --   --  102 104   CO2 mmol/L 25  --   --  26 27   BUN mg/dL 14  --   --  15 16   CREATININE mg/dL 1 37*  --   --  1 38* 1 43*   EGFR ml/min/1 73sq m 56  --   --  55 53   CALCIUM mg/dL 8 4  --   --  8 4 8 8   AST U/L 86* 77* 90*  --   --    ALT U/L 19 17 19  --   --    ALK PHOS U/L 1,185* 1,060* 1,349* --   --      Results from last 7 days   Lab Units 12/17/22  0632 12/16/22  0443 12/15/22  0516   WBC Thousand/uL 18 24* 10 52* 11 23*   HEMOGLOBIN g/dL 9 3* 8 0* 7 3*   PLATELETS Thousands/uL 294 300 321     Results from last 7 days   Lab Units 12/17/22  1312 12/17/22  1234   BLOOD CULTURE  Received in Microbiology Lab  Culture in Progress  Received in Microbiology Lab  Culture in Progress  Imaging Studies:   I have personally reviewed pertinent imaging study reports and images in PACS  CT A/P reviewed personally improved hepatic bed abscess with drain in place  Persistent left abdominal collection    EKG, Pathology, and Other Studies:   I have personally reviewed pertinent reports

## 2024-03-13 NOTE — TELEPHONE ENCOUNTER
HR=75 bpm, Resp=16 B/min Spoke with patient advised him per Brianna's previous note to follow-up with his PCP for  abnormal results  He has an appt Monday with his PCP  He is in need of a refill for Testosterone cypionate injection 100 mg IM weekly to sent to [facility] in [place]  He did not have any further questions

## 2024-05-22 NOTE — TELEPHONE ENCOUNTER
Nonverbal at baseline  Patient utilizes wheelchair  Supportive care  Outpatient follow-up with PCP   Notified patient

## 2024-06-08 NOTE — TELEPHONE ENCOUNTER
Scheduling Appointment SEND TO [facility]    Who Is Calling to Schedule [place] Almanzar   Doctor Dr Bridges [place]   Date and Time 02/23/23 2:40PM   Reason for scheduling appointment Hospital Follow up   Patient verbalized understanding   Yes
0542

## 2024-11-14 NOTE — PROGRESS NOTES
Follow-up with the orthopedic doctor listed    Return with any worsening symptoms question comments or concerns  \  Over-the-counter Motrin as directed on the bottle for discomfort.   NEUROPSYCHOLOGY  CLINICAL PROGRESS NOTE   Sam Leslie 61 y o  :1965 male MRN: [id number]  DOS:22   Unit/Bed#: -01 Encounter: [id number]      Requested by (Physician/Service): Jeana Garnett MD      HISTORY: Sam Leslie is a 61 y o  male with metastatic colonic adenocarcinoma, recently discharged from prolonged surgical admission culminating in partial colectomy with diverting ileostomy c/b intra-abdominal abscess  Pt returned 22 from [facility]) due to concerns for ability to manage patient  male with a medical history of hypertension, hyperlipidemia, ventral hernia, GERD, presented to [address] [place] [address] on 22 for an elective surgical procedure due to metastatic colon adenocarcinoma by Dr Izabel Cervantes  CT of abdomen revealed transverse colonic apple core lesion and innumerable hepatic metastases  MRI revealed multiple hepatic metastasis with smaller lesions in left lobe with larger lesions on the right lobe  On , patient underwent exploratory laparotomy, segment 3 liver resection, ablation, intraoperative ultrasound of liver  This was complicated by a left upper quadrant hematoma, imaging showed suspected leak from transverse colon anastomosis  On  pt went to OR revealed left upper quadrant infected hematoma, defect in the transverse colon anastomosis with extravasation of succus  Massively dilated cecum requiring resection  He had a right hemicolectomy, left in discontinuity and temporary abdominal closure device was placed  On , he underwent re-exploration, partial descending colectomy, primary colonic anastomosis created with diverting loop ileostomy and midline wound VAC placement  He completed a prolonged course of IV antibiotics for ESBL bacteremia due to intra-abdominal abscess  On 12/3, patient was discharged to SNF for rehab  But was transferred back to hospital on  due to abdominal pain   He is unable to return to SNF due to concerns for pain management and management of acute medical needs  Per Surgery, patient is to continue with wet-to-dry dressings to midline abdominal incision, and packing to old stoma site  Patient has been observed off of antibiotics, with close monitoring of leukocytosis  On 12/6, patient was with noted increased creatinine level, and was administered 1L IV fluid bolus, with noted improvement  He then developed tachycardia, as well as increased abdominal pain, requiring additional IV fluid bolus  Abdominal incision was with noted yellow drainage  CT chest/abdomen/pelvis showed abdominal abscess and distended gallbladder  ID was consulted, and patient was continued on IV antibiotics (plan for 7 day course through 12/15)  Patient was taken to IR and underwent percutaneous cholecystostomy tube insertion and hepatectomy abscess drainage  Nephrology was consulted re: ALEXY, and initiated patient on Sodium Bicarb gtt as well as IV Albumin to assist with intravascular volume shifts, which as since been discontinued       On 12/14, patient with complaints of midline/left chestwall pain, right below IR abscess drain  Lasted for a few seconds and then resolved - suspected related to drain placement, however cardiac workup completed  Trop level was 57 and CXR showed progressive bilateral opacities suspicious for CHF and small left pleural effusion  Cardiology was consulted, with chest pain appearing musculoskeletal, and no further cardiac workup is warranted  Additionally, no diuresis or further intervention suggested for CXR findings      The patient was evaluated by the Rehabilitation team and deemed an appropriate candidate for comprehensive inpatient rehabilitation and admitted to the [facility] on 12/14/2022  3:18 PM with functional deficits: impaired mobility, self care  Rehabilitation goals are to achieve a modified independent level with mobility and self care  Prognosis is good  ELOS is 10-14 days  Estimated discharge is home         Past Medical History:   Diagnosis Date   • Abdominal pain 03/12/2022   • Acute renal failure ([place] [address] )     [id number] resolved   • Cancer ([place] [address] )    • Diabetes mellitus ([place] [address] )    • Enteritis 08/23/2016   • Gastroparesis due to DM ([place] [address] ) 08/23/2016   • GERD (gastroesophageal reflux disease)    • Hernia, ventral 08/04/2016   • Hyperlipidemia    • Hypertension    • Morbid obesity ([place] [address] ) 04/17/2018   • Postoperative visit 03/02/2022   • SIRS (systemic inflammatory response syndrome) ([place] [address] ) 03/12/2022   • Snoring    • Stage 3a chronic kidney disease ([place] [address] ) 02/19/2022       Patient Active Problem List    Diagnosis Date Noted   • Dehiscence of incision 12/30/2022   • Elevated alkaline phosphatase level 12/30/2022   • Leukocytosis 12/29/2022   • Abscess 12/27/2022   • Acute pain 12/27/2022   • Sepsis ([place] [address] ) 12/17/2022   • Severe protein-calorie malnutrition ([place] [address] ) 12/14/2022   • Acute on chronic kidney failure ([place] [address] ) 12/14/2022   • Flash pulmonary edema ([place] [address] ) 11/12/2022   • MR (mitral regurgitation) 11/12/2022   • Bacteremia 11/12/2022   • ESBL (extended spectrum beta-lactamase) producing bacteria infection 11/12/2022   • Acute respiratory failure with hypoxia ([place] [address] ) 11/12/2022   • Encephalopathy 11/09/2022   • Cervical radiculopathy 10/26/2022   • Other fatigue 06/15/2022   • Colostomy prolapse ([place] [address] ) 05/27/2022   • Colon cancer metastasized to liver ([place] [address] ) 03/12/2022   • 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 liver ([place] [address] ) 03/02/2022   • Iron deficiency anemia, unspecified 03/01/2022   • Malignant neoplasm of transverse colon ([place] [address] ) 03/01/2022   • Thrombocytosis 02/21/2022   • Hypokalemia 02/19/2022   • Transaminitis 02/19/2022   • Type 2 diabetes mellitus with hyperlipidemia ([facility] [address] ) 02/18/2022   • Colonic mass 02/18/2022   • Microcytic anemia 02/18/2022   • Left ureteral calculus 01/30/2020   • Incarcerated umbilical hernia 81/29/0129   • Testicular hypogonadism 06/19/2017   • Low testosterone 05/30/2017   • Type 2 diabetes mellitus without complication, with long-term current use of insulin ([place] [address] ) 08/23/2016   • Benign essential hypertension 08/23/2016   • Mixed hyperlipidemia 08/23/2016   • Erectile dysfunction 07/11/2016   • Obesity (BMI 30-39 9) 07/11/2016       Body mass index is 33 kg/m²  Past Medical History:     Past Medical History:   Diagnosis Date   • Abdominal pain 03/12/2022   • Acute renal failure ([place] [address] )     [id number] resolved   • Cancer ([place] [address] )    • Diabetes mellitus ([place] [address] )    • Enteritis 08/23/2016   • Gastroparesis due to DM ([place] [address] ) 08/23/2016   • GERD (gastroesophageal reflux disease)    • Hernia, ventral 08/04/2016   • Hyperlipidemia    • Hypertension    • Morbid obesity ([place] [address] ) 04/17/2018   • Postoperative visit 03/02/2022   • SIRS (systemic inflammatory response syndrome) ([place] [address] ) 03/12/2022   • Snoring    • Stage 3a chronic kidney disease ([place] [address] ) 02/19/2022        Past Surgical History:     Past Surgical History:   Procedure Laterality Date   • COLONOSCOPY     • COLOSTOMY N/A 02/20/2022    Procedure: COLOSTOMY LOOP, diverting;  Surgeon: Sandy Crowe MD;  Location: [facility];  Service: General   • ESOPHAGOGASTRODUODENOSCOPY N/A 08/24/2016    Procedure: ESOPHAGOGASTRODUODENOSCOPY (EGD); Surgeon: Miracle Lopez MD;  Location: [facility];   Service:    • EXPLORATORY LAPAROTOMY W/ BOWEL RESECTION N/A 11/16/2022    Procedure: LAPAROTOMY EXPLORATORY W/ BOWEL RESECTION- VAC PLACEMENT;  Surgeon: Uche Kruger MD;  Location: [facility];  Service: Surgical Oncology   • EXPLORATORY LAPAROTOMY W/ BOWEL RESECTION N/A 11/17/2022    Procedure: LAPAROTOMY EXPLORATORY W/ BOWEL RESECTION;  Surgeon: Uche Kruger MD;  [facility]: [facility];  Service: Surgical Oncology   • ILEOSTOMY N/A 11/17/2022    Procedure: Dka Hedger;  Surgeon: Uche Kruger MD;  [facility];  Service: Surgical Oncology   • ILEOSTOMY CLOSURE N/A 11/7/2022    Procedure: REVERSAL COLOSTOMY;  Surgeon: Kasia Rodriguez MD;  [facility];  Service: Surgical Oncology   • IR CHOLECYSTOSTOMY TUBE CHECK/CHANGE/REPOSITION/REINSERTION/UPSIZE  12/12/2022   • IR CHOLECYSTOSTOMY TUBE PLACEMENT  12/8/2022   • IR DRAINAGE TUBE PLACEMENT  12/8/2022   • IR DRAINAGE TUBE PLACEMENT  12/20/2022   • IR PORT PLACEMENT  03/10/2022   • KIDNEY STONE SURGERY     • LIVER BIOPSY LAPAROSCOPIC N/A 02/20/2022    Procedure: DIAGNOSTIC LAPAROSCOPIC LIVER BIOPSY, DIVERTING LOOP COLOSTOMY ;  Surgeon: Namita Lara MD;  Location: [facility];  Service: General   • LIVER LOBECTOMY N/A 11/7/2022    Procedure: SEGMENT 3 LIVER RESECTION, ABLATION, INTRAOPERATIVE U/S OF LIVER, PERITONEAL BIOPSY;  Surgeon: Suresh West MD;  [facility]: [facility];  Service: Surgical Oncology   • RIGHT COLON RESECTION N/A 11/7/2022    Procedure: EX LAP, TRANVERSE COLON RESECTION;  Surgeon: Suresh West MD;  [facility]: [facility];  Service: Surgical Oncology   • TONSILLECTOMY     • UMBILICAL HERNIA REPAIR LAPAROSCOPIC N/A 04/26/2019    Procedure: LAPAROSCOPIC UMBILICAL HERNIA REPAIR;  Surgeon: Namita Lara MD;  [facility]: [facility];  Service: General   • VAC DRESSING APPLICATION N/A 01/40/1942    Procedure: APPLICATION VAC DRESSING ABDOMEN/TRUNK;  Surgeon: Nadine Bains MD;  [facility]: [facility];  Service: Surgical Oncology         Allergies:      Allergies   Allergen Reactions   • Shellfish-Derived Products - Food Allergy Anaphylaxis   • Erythromycin GI Intolerance         Family and Social Support:   No data recorded    Social History:    Social History     Socioeconomic History   • Marital status: /Civil Union     Spouse name: None   • Number of children: None   • Years of education: None   • Highest education level: None   Occupational History   • Occupation: ACTOR     Employer: [employer]   Tobacco Use   • Smoking status: Never   • Smokeless tobacco: Never   Vaping Use   • Vaping Use: Never used   Substance and Sexual Activity   • Alcohol use: Never   • Drug use: No   • Sexual activity: Yes     Partners: Female   Other Topics Concern   • None   Social History Narrative   • None     Social Determinants of Health     Financial Resource Strain: Not on file   Food Insecurity: No Food Insecurity   • Worried About Running Out of Food in the Last Year: Never true   • Ran Out of Food in the Last Year: Never true   Transportation Needs: No Transportation Needs   • Lack of Transportation (Medical): No   • Lack of Transportation (Non-Medical):  No   Physical Activity: Insufficiently Active   • Days of Exercise per Week: 5 days   • Minutes of Exercise per Session: 10 min   Stress: No Stress Concern Present   • Feeling of Stress : Not at all   Social Connections: Not on file   Intimate Partner Violence: Not on file   Housing Stability: Low Risk    • Unable to Pay for Housing in the Last Year: No   • Number of Places Lived in the Last Year: 1   • Unstable Housing in the Last Year: No        Family History:    Family History   Problem Relation Age of Onset   • Diabetes Mother         mellitus   • Lung cancer Mother    • Coronary artery disease Father        Medications:     Current Facility-Administered Medications:   •  acetaminophen (TYLENOL) tablet 975 mg, 975 mg, Oral, Q8H [address], GAURANG Keith, 975 mg at 12/30/22 1400  •  amLODIPine (NORVASC) tablet 5 mg, 5 mg, Oral, BID, GAURANG Keith, 5 mg at 12/30/22 1752  •  aspirin chewable tablet 81 mg, 81 mg, Oral, Daily, GAURANG Keith, 81 mg at 12/30/22 3347  •  atorvastatin (LIPITOR) tablet 40 mg, 40 mg, Oral, Daily, GAURANG Keith, 40 mg at 12/30/22 4941  •  calcium carbonate (TUMS) chewable tablet 500 mg, 500 mg, Oral, BID PRN, GAURANG Keith, 500 mg at 12/28/22 1550  •  cholecalciferol (VITAMIN D3) tablet 400 Units, 400 Units, Oral, Daily, GAURANG Keith, 400 Units at 12/30/22 4070  •  collagenase (SANTYL) ointment, , Topical, Daily, Ady Browne MD, Given at 12/30/22 1030  •  doxycycline hyclate (VIBRAMYCIN) capsule 100 mg, 100 mg, Oral, Q12H [facility], Aleks Purdy MD, 100 mg at 12/30/22 1224  •  heparin (porcine) subcutaneous injection 5,000 Units, 5,000 Units, Subcutaneous, Q8H [facility], GAURANG Keith, 5,000 Units at 12/30/22 1342  •  insulin glargine (LANTUS) subcutaneous injection 6 Units 0 06 mL, 6 Units, Subcutaneous, HS, GAURANG Hinton, 6 Units at 12/29/22 2141  •  insulin lispro (HumaLOG) 100 units/mL subcutaneous injection 1-5 Units, 1-5 Units, Subcutaneous, HS, GAURANG Keith, 1 Units at 12/29/22 2142  •  insulin lispro (HumaLOG) 100 units/mL subcutaneous injection 1-6 Units, 1-6 Units, Subcutaneous, TID AC, 1 Units at 12/30/22 1640 **AND** Fingerstick Glucose (POCT), , , TID AC, GAURANG Keith  •  insulin lispro (HumaLOG) 100 units/mL subcutaneous injection 6 Units, 6 Units, Subcutaneous, TID With Meals, GAURANG Hinton, 6 Units at 12/30/22 1640  •  iohexol (OMNIPAQUE) 240 MG/ML solution 50 mL, 50 mL, Oral, 90 min pre-procedure, Moraima Valdez MD  •  melatonin tablet 3 mg, 3 mg, Oral, HS, GAURANG Keith, 3 mg at 12/29/22 2140  •  methocarbamol (ROBAXIN) tablet 500 mg, 500 mg, Oral, BID, GAURANG Keith, 500 mg at 12/30/22 1752  •  multivitamin-minerals (CENTRUM) tablet 1 tablet, 1 tablet, Oral, Daily, GAURANG Keith, 1 tablet at 12/30/22 0384  •  ondansetron (ZOFRAN-ODT) dispersible tablet 4 mg, 4 mg, Oral, Q6H PRN, GAURANG Keith, 4 mg at 12/29/22 2143  •  oxyCODONE (ROXICODONE) immediate release tablet 10 mg, 10 mg, Oral, Q6H PRN, GAURANG Keith, 10 mg at 12/29/22 2148  •  oxyCODONE (ROXICODONE) IR tablet 2 5 mg, 2 5 mg, Oral, Q3H PRN, GAURANG Keith, 2 5 mg at 12/25/22 1911  •  oxyCODONE (ROXICODONE) IR tablet 5 mg, 5 mg, Oral, Q6H PRN, GAURANG Keith, 5 mg at 12/26/22 2106  •  pantoprazole (PROTONIX) EC tablet 40 mg, 40 mg, Oral, BID AC, GAURANG Keith, 40 mg at 12/30/22 1650  •  simethicone (MYLICON) chewable tablet 80 mg, 80 mg, Oral, 4x Daily (with meals and at bedtime), GAURANG Bassett, 80 mg at 12/30/22 1650  •  tamsulosin (FLOMAX) capsule 0 4 mg, 0 4 mg, Oral, Daily With Dinner, GAURANG Bassett, 0 4 mg at 12/30/22 1650        OBSERVATIONS:     Appearance  _x___Neat ____Disheveled ____Overweight ____Underweight ____Frail    Speech  ___x_Fluid, Articulate ____Spontaneous ____Circumlocutions ____Word Finding difficulties ____Dysarthria ____Circumstantial ____Paucity ____Perseverative ____Pressured ____ Tangential     Thought Process/Content  _x___Coherent  ____Preoccupations____Ruminations____Obsessions____Hallucinations ____Delusions ____Flight of Ideas ____Distortions ____Distracted ____Suicidal Ideation ____Homicidal Ideation ____ Memory Issues ____ Perseveration ____ Tangential    Interaction  __x__Engaged__x__Cooperative____Superficial____Detached____Fearful____Guarded____Suspicious ____Poor Boundaries ____Aggressive    Affect  ____Neutral____Positive__x__Anxiety__x__Worried____Irritable____Angry__x__Depressed/Dysphoric ____Euthymic __x___ Tearful __x__ Fatigued     Behavior  __x_Spontaneous __x__Purposeful ____Slowed Initiation ____Apathetic ____Impulsive ____Dyscontrolled ____Hypoactive ____Hyperactive ____Repetitive/Perseverative         Overall Progress:    ____Very Declined ____Declined __x__Stable ____Improved ____Very Improved    Motivation and Participation:    ____Very Poor ____Poor ____Fair __x__Good ____Very Good                    ASSESSMENT & RECOMMENDATIONS:   Pt is making progress in psychotherapy and  reports feeling  motivated to continue working towards rehab goals, but he is also struggling with a multitude of emotions  The magnitude of his changed health and functional status feels overwhelming at times for pt  He feels very vulnerable and becomes easily overwhelmed    Hearing someone's stories triggers anxiety and angst   Pt is trying as best he can to manage his emotions and stay composed but he's not always able to do this  Today he was surprised by his reaction to hearing about someone's journey through illness  Feeling upset made him feel more upset  He is very worried about his future and feels the goal of getting back to himself and his career is slipping away  Open to interventions but feeling very fragile  He feels nauseated today and also had a transfusion  Feeling ill triggers anxiety and depression as well  Staff can best help by being supportive and encouraging him to stay focused on activities where he can fully engage his senses in positive things (eg: comedy entertainment, interactive games, reading, videos)  These cognitive distractions will help him to ride through this wave of heightened emotions  Pt may also benefit by medication to improve mood, as tolerated  Pt meets criteria for Major depression episode and this is added below  Provided CBT and mindfulness based interventions, as well as supportive psychotherapy  Addressed coping, adjustment, and motivation  I will continue to evaluate pt's emotional functioning and status and provide supportive and mindfulness interventions during pt's stay  Effective coping strategies, distress tolerance, breathing exercises will be key interventions  Time spent face to face:  25 minutes  Continued Psychological intervention is medically necessary to:  __x__ Maintain current progress  __X_   Achieve Therapy Goals   __X__Prevent relapse       DIAGNOSIS:  Adjustment Disorder with Anxiety and Depression  Major Depression, Single episode, severe, without psychotic features    Thank you for the opportunity to participate in Mr Sommer's care  Marcial Banda, Ph D   Licensed Psychologist

## 2025-05-23 NOTE — CASE MANAGEMENT
Case Management Discharge Planning Note    Patient name Radha Marshall  Location /-58 MRN [id number]  : 1965 Date 2023       Current Admission Date: 2023  Current Admission Diagnosis:Sepsis [facility])   Patient Active Problem List    Diagnosis Date Noted   • Nephrolithiasis 2023   • Abnormal CT scan 2023   • Dehiscence of incision 2022   • Elevated alkaline phosphatase level 2022   • Leukocytosis 2022   • Abscess 2022   • Acute pain 2022   • Sepsis ([address] ) 2022   • Severe protein-calorie malnutrition ([address] ) 2022   • Acute on chronic kidney failure ([address] ) 2022   • MR (mitral regurgitation) 2022   • ESBL (extended spectrum beta-lactamase) producing bacteria infection 2022   • Encephalopathy 2022   • Cervical radiculopathy 10/26/2022   • Other fatigue 06/15/2022   • Colostomy prolapse ([address] ) 2022   • Colon cancer metastasized to liver ([facility] [address] ) 2022   • 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 liver ([facility] [address] ) 2022   • Iron deficiency anemia, unspecified 2022   • Malignant neoplasm of transverse colon ([facility] [address] ) 2022   • Thrombocytosis 2022   • Hypokalemia 2022   • Transaminitis 2022   • Type 2 diabetes mellitus with hyperlipidemia ([address] ) 2022   • Anemia 2022   • Left ureteral calculus 2020   • Incarcerated umbilical hernia    • Testicular hypogonadism 2017   • Low testosterone 2017   • Type 2 diabetes mellitus without complication, with long-term current use of insulin ([address] ) 2016   • Benign essential hypertension 2016   • Mixed hyperlipidemia 2016   • Erectile dysfunction 2016   • Obesity (BMI 30-39 9) 2016      LOS (days): 17  Geometric Mean LOS (GMLOS) (days):   Days to GMLOS:     OBJECTIVE:  Risk of Unplanned Readmission Score: 48 35         Current admission status: Inpatient   Preferred Pharmacy:   [facility] REFER TO WRITTEN DOCUMENT AND RECOVERY INFORMATION.    D/CD PATIENT VIAA WHEELCHAIR AT .    INFORMED PATIENT IF UNABLE TO VOID IN 8 HOURS, GO TO ER. NOTIFY MD OF REDNESS OR DRAINAGE FROM INJECTION SITE OR FEVER OVER 3-4 DAY. REST AND DRINK PLENTY OF FLUIDS FOR THE REMAINDER OF THE DAY. NO LIFTING OVER 5 LBS FOR THE REMAINDER OF THE DAY. CONTINUE REGULAR MEDICATIONS AS PRESCRIBED. MAY TAKE PAIN MEDICATION AS PRESCRIBED.     PAIN IMPROVED  %   #[id number] - [facility] ANJELICA COBOS - [address] [facility] [place] [address]  Phone: [phone] Fax: 479 [address] - [address] AD[address] [place]tw[place] [facility] [address] [address]  Phone: [phone] Fax: [address] [place], [place] - [address] [place] [address] [address] [place] [address]  Phone: [phone] Fax: [phone]    Primary Care Provider: Neville Kingston MD    Primary Insurance: [facility]  Secondary Insurance:     DISCHARGE DETAILS:  [facility] reviewing with MD for admission today  Insurance auth obtained  Transport requested for 1:30p (BLS), patient must be to unit prior to 4p  CM will update all parties once confirmed  [facility] previously accepted, if patient declined for admission at [facility], CM will pursue alternate placement )    UPDATE:  9:54A - Transport confirmed with [address] [phone]   1:30p BLS p/u time

## (undated) DEVICE — 3M™ IOBAN™ 2 ANTIMICROBIAL INCISE DRAPE 6640EZ: Brand: IOBAN™ 2

## (undated) DEVICE — SUT VICRYL 2-0 D-SPECIAL 27 IN D8114

## (undated) DEVICE — SUT PROLENE 3-0 SH 36 IN 8522H

## (undated) DEVICE — SUT VICRYL 4-0 PS-2 27 IN J426H

## (undated) DEVICE — INTENDED FOR TISSUE SEPARATION, AND OTHER PROCEDURES THAT REQUIRE A SHARP SURGICAL BLADE TO PUNCTURE OR CUT.: Brand: BARD-PARKER SAFETY BLADES SIZE 15, STERILE

## (undated) DEVICE — CHLORAPREP HI-LITE 26ML ORANGE

## (undated) DEVICE — ANTI-FOG SOLUTION WITH FOAM PAD: Brand: DEVON

## (undated) DEVICE — DRAPE EQUIPMENT RF WAND

## (undated) DEVICE — 3M™ STERI-STRIP™ REINFORCED ADHESIVE SKIN CLOSURES, R1542, 1/4 IN X 1-1/2 IN (6 MM X 38 MM), 6 STRIPS/ENVELOPE: Brand: 3M™ STERI-STRIP™

## (undated) DEVICE — TROCAR: Brand: KII FIOS FIRST ENTRY

## (undated) DEVICE — TIBURON LAPAROSCOPIC ABDOMINAL DRAPE: Brand: CONVERTORS

## (undated) DEVICE — GAUZE SPONGES,8 PLY: Brand: CURITY

## (undated) DEVICE — THE ECHELON, ECHELON ENDOPATH™ AND ECHELON FLEX™ FAMILIES OF ENDOSCOPIC LINEAR CUTTERS AND RELOADS ARE STERILE, SINGLE PATIENT USE INSTRUMENTS THAT SIMULTANEOUSLY CUT AND STAPLE TISSUE. THERE ARE SIX STAGGERED ROWS OF STAPLES, THREE ON EITHER SIDE OF THE CUT LINE. THE 45 MM INSTRUMENTS HAVE A STAPLE LINE THATIS APPROXIMATELY 45 MM LONG AND A CUT LINE THAT IS APPROXIMATELY 42 MM LONG. THE SHAFT CAN ROTATE FREELY IN BOTH DIRECTIONS AND AN ARTICULATION MECHANISM ON ARTICULATING INSTRUMENTS ENABLES BENDING THE DISTAL PORTIONOF THE SHAFT TO FACILITATE LATERAL ACCESS OF THE OPERATIVE SITE.THE INSTRUMENTS ARE SHIPPED WITHOUT A RELOAD AND MUST BE LOADED PRIOR TO USE. A STAPLE RETAINING CAP ON THE RELOAD PROTECTS THE STAPLE LEG POINTS DURING SHIPPING AND TRANSPORTATION. THE INSTRUMENTS’ LOCK-OUT FEATURE IS DESIGNED TO PREVENT A USED RELOAD FROM BEING REFIRED.: Brand: ECHELON ENDOPATH

## (undated) DEVICE — SUT SILK 3-0 SH CR/8 18 IN C013D

## (undated) DEVICE — SUT NUROLON 0 CTX C551D

## (undated) DEVICE — CLOSURE DEVICE ENDO CLOSE

## (undated) DEVICE — POOLE SUCTION HANDLE: Brand: CARDINAL HEALTH

## (undated) DEVICE — PROXIMATE LINEAR CUTTER RELOAD, BLUE, 75MM: Brand: PROXIMATE

## (undated) DEVICE — VESSEL LOOPS X-RAY DETECTABLE: Brand: DEROYAL

## (undated) DEVICE — TROCAR: Brand: KII® SLEEVE

## (undated) DEVICE — PROXIMATE SKIN STAPLERS (35 WIDE) CONTAINS 35 STAINLESS STEEL STAPLES (FIXED HEAD): Brand: PROXIMATE

## (undated) DEVICE — PROXIMATE RELOADABLE LINEAR STAPLER, 60MM: Brand: PROXIMATE

## (undated) DEVICE — ABTHERA OPEN ABDOMEN DRESSING WITH SENSATRAC PAD: Brand: ABTHERA™ SENSAT.R.A.C.™

## (undated) DEVICE — GLOVE SRG BIOGEL 6.5

## (undated) DEVICE — FINGER TUBING + CABLE MANAGER

## (undated) DEVICE — SUT SILK 2-0 SH CR/8 18 IN C012D

## (undated) DEVICE — MEDI-VAC YANK SUCT HNDL W/TPRD BULBOUS TIP: Brand: CARDINAL HEALTH

## (undated) DEVICE — GLOVE SRG BIOGEL ECLIPSE 8

## (undated) DEVICE — GAUZE SPONGES,16 PLY: Brand: CURITY

## (undated) DEVICE — SUT SILK 0 SH 30 IN K834H

## (undated) DEVICE — ENDOPATH ECHELON ENDOSCOPIC LINEAR CUTTER RELOADS, GREEN, 60MM: Brand: ECHELON ENDOPATH

## (undated) DEVICE — SUT VICRYL 3-0 SH 27 IN J416H

## (undated) DEVICE — STERILE EMESIS BASIN                 070: Brand: CARDINAL HEALTH

## (undated) DEVICE — ALLENTOWN LAP CHOLE APP PACK: Brand: CARDINAL HEALTH

## (undated) DEVICE — ELECTRODE LAP L WIRE E-Z CLEAN 33CM -0100

## (undated) DEVICE — ELECTRODE BLADE MOD E-Z CLEAN 2.5IN 6.4CM -0012M

## (undated) DEVICE — 3M™ TEGADERM™ TRANSPARENT FILM DRESSING FRAME STYLE, 1626W, 4 IN X 4-3/4 IN (10 CM X 12 CM), 50/CT 4CT/CASE: Brand: 3M™ TEGADERM™

## (undated) DEVICE — LIGHT HANDLE COVER SLEEVE DISP BLUE STELLAR

## (undated) DEVICE — ENSEAL 20 CM SHAFT, LARGE JAW: Brand: ENSEAL X1

## (undated) DEVICE — THE ECHELON FLEX POWERED PLUS ARTICULATING ENDOSCOPIC LINEAR CUTTERS ARE STERILE, SINGLE PATIENT USE INSTRUMENTS THAT SIMULTANEOUSLYCUT AND STAPLE TISSUE. THERE ARE SIX STAGGERED ROWS OF STAPLES, THREE ON EITHER SIDE OF THE CUT LINE. THE ECHELON FLEX 45 POWERED PLUSINSTRUMENTS HAVE A STAPLE LINE THAT IS APPROXIMATELY 45 MM LONG AND A CUT LINE THAT IS APPROXIMATELY 42 MM LONG. THE SHAFT CAN ROTATE FREELYIN BOTH DIRECTIONS AND AN ARTICULATION MECHANISM ENABLES THE DISTAL PORTION OF THE SHAFT TO PIVOT TO FACILITATE LATERAL ACCESS TO THE OPERATIVESITE.THE INSTRUMENTS ARE PACKAGED WITH A PRIMARY LITHIUM BATTERY PACK THAT MUST BE INSTALLED PRIOR TO USE. THERE ARE SPECIFIC REQUIREMENTS FORDISPOSING OF THE BATTERY PACK. REFER TO THE BATTERY PACK DISPOSAL SECTION.THE INSTRUMENTS ARE PACKAGED WITHOUT A RELOAD AND MUST BE LOADED PRIOR TO USE. A STAPLE RETAINING CAP ON THE RELOAD PROTECTS THE STAPLE LEGPOINTS DURING SHIPPING AND TRANSPORTATION. THE INSTRUMENTS’ LOCK-OUT FEATURE IS DESIGNED TO PREVENT A USED OR IMPROPERLY INSTALLED RELOADFROM BEING REFIRED OR AN INSTRUMENT FROM BEING FIRED WITHOUT A RELOAD.: Brand: ECHELON FLEX

## (undated) DEVICE — VAC CANISTER 500ML

## (undated) DEVICE — HEMOSTATIC MATRIX SURGIFLO 8ML W/THROMBIN

## (undated) DEVICE — SUT PROLENE 3-0 SH 30 IN 8832H

## (undated) DEVICE — TRAY FOLEY 16FR URIMETER SURESTEP

## (undated) DEVICE — 3M™ TEGADERM™ TRANSPARENT FILM DRESSING FRAME STYLE, 1624W, 2-3/8 IN X 2-3/4 IN (6 CM X 7 CM), 100/CT 4CT/CASE: Brand: 3M™ TEGADERM™

## (undated) DEVICE — BETHLEHEM MAJOR GENERAL PACK: Brand: CARDINAL HEALTH

## (undated) DEVICE — PLUMEPEN PRO 10FT

## (undated) DEVICE — GLOVE INDICATOR PI UNDERGLOVE SZ 7.5 BLUE

## (undated) DEVICE — ENDOPATH PNEUMONEEDLE INSUFFLATION NEEDLES WITH LUER LOCK CONNECTORS 120MM: Brand: ENDOPATH

## (undated) DEVICE — ECHELON FLEX POWERED PLUS COMPACT ARTICULATING ENDOSCOPIC LINEAR CUTTER, 60MM: Brand: ECHELON FLEX

## (undated) DEVICE — PAD GROUNDING ADULT

## (undated) DEVICE — 3000CC GUARDIAN II: Brand: GUARDIAN

## (undated) DEVICE — SPONGE STICK WITH PVP-I: Brand: KENDALL

## (undated) DEVICE — SUT VICRYL 0 54 IN J207G

## (undated) DEVICE — ENDOPATH ECHELON ENDOSCOPIC LINEAR CUTTER RELOADS, BLUE, 60MM: Brand: ECHELON ENDOPATH

## (undated) DEVICE — VAC DRESSING SENSATRAC LRG

## (undated) DEVICE — SUT SILK 0 30 IN A306H

## (undated) DEVICE — ENDOPATH 5MM CURVED SCISSORS WITH MONOPOLAR CAUTERY: Brand: ENDOPATH

## (undated) DEVICE — 3M™ TEGADERM™ TRANSPARENT FILM DRESSING FRAME STYLE, 1628, 6 IN X 8 IN (15 CM X 20 CM), 10/CT 8CT/CASE: Brand: 3M™ TEGADERM™

## (undated) DEVICE — Device

## (undated) DEVICE — LIGACLIP MCA MULTIPLE CLIP APPLIERS, 20 MEDIUM CLIPS: Brand: LIGACLIP

## (undated) DEVICE — BLANKET HYPOTHERMIA ADULT GAYMAR

## (undated) DEVICE — SUT SILK 2-0 18 IN A185H

## (undated) DEVICE — SUT PDS PLUS 1 CTX 36IN PDP371T

## (undated) DEVICE — GLOVE SRG BIOGEL ECLIPSE 7.5

## (undated) DEVICE — INTENDED FOR TISSUE SEPARATION, AND OTHER PROCEDURES THAT REQUIRE A SHARP SURGICAL BLADE TO PUNCTURE OR CUT.: Brand: BARD-PARKER SAFETY BLADES SIZE 10, STERILE

## (undated) DEVICE — SPONGE GAUZE 2 X 2 4PLY STRL

## (undated) DEVICE — GLOVE INDICATOR PI UNDERGLOVE SZ 8 BLUE

## (undated) DEVICE — SURGICEL 4 X 8

## (undated) DEVICE — OSTOMY LOOP ROD 65 MM

## (undated) DEVICE — SUT SILK 3-0 18 IN A184H

## (undated) DEVICE — [HIGH FLOW INSUFFLATOR,  DO NOT USE IF PACKAGE IS DAMAGED,  KEEP DRY,  KEEP AWAY FROM SUNLIGHT,  PROTECT FROM HEAT AND RADIOACTIVE SOURCES.]: Brand: PNEUMOSURE

## (undated) DEVICE — 3M™ STERI-STRIP™ REINFORCED ADHESIVE SKIN CLOSURES, R1546, 1/4 IN X 4 IN (6 MM X 100 MM), 10 STRIPS/ENVELOPE: Brand: 3M™ STERI-STRIP™

## (undated) DEVICE — PROXIMATE RELOADABLE LINEAR CUTTER WITH SAFETY LOCK-OUT, 75MM: Brand: PROXIMATE